# Patient Record
Sex: MALE | Race: WHITE | NOT HISPANIC OR LATINO | URBAN - METROPOLITAN AREA
[De-identification: names, ages, dates, MRNs, and addresses within clinical notes are randomized per-mention and may not be internally consistent; named-entity substitution may affect disease eponyms.]

---

## 2017-02-28 ENCOUNTER — OUTPATIENT (OUTPATIENT)
Dept: OUTPATIENT SERVICES | Facility: HOSPITAL | Age: 72
LOS: 1 days | End: 2017-02-28
Payer: COMMERCIAL

## 2017-02-28 PROCEDURE — 73564 X-RAY EXAM KNEE 4 OR MORE: CPT

## 2017-02-28 PROCEDURE — 73564 X-RAY EXAM KNEE 4 OR MORE: CPT | Mod: 26,RT

## 2017-03-16 ENCOUNTER — OUTPATIENT (OUTPATIENT)
Dept: OUTPATIENT SERVICES | Facility: HOSPITAL | Age: 72
LOS: 1 days | End: 2017-03-16

## 2017-03-16 DIAGNOSIS — Z22.321 CARRIER OR SUSPECTED CARRIER OF METHICILLIN SUSCEPTIBLE STAPHYLOCOCCUS AUREUS: ICD-10-CM

## 2017-03-17 LAB
MRSA PCR RESULT.: NEGATIVE — SIGNIFICANT CHANGE UP
S AUREUS DNA NOSE QL NAA+PROBE: NEGATIVE — SIGNIFICANT CHANGE UP

## 2017-08-15 ENCOUNTER — OUTPATIENT (OUTPATIENT)
Dept: OUTPATIENT SERVICES | Facility: HOSPITAL | Age: 72
LOS: 1 days | Discharge: HOME | End: 2017-08-15

## 2017-08-17 DIAGNOSIS — H25.12 AGE-RELATED NUCLEAR CATARACT, LEFT EYE: ICD-10-CM

## 2017-08-25 ENCOUNTER — OUTPATIENT (OUTPATIENT)
Dept: OUTPATIENT SERVICES | Facility: HOSPITAL | Age: 72
LOS: 1 days | Discharge: HOME | End: 2017-08-25

## 2017-08-30 DIAGNOSIS — Z96.653 PRESENCE OF ARTIFICIAL KNEE JOINT, BILATERAL: ICD-10-CM

## 2017-08-30 DIAGNOSIS — H25.11 AGE-RELATED NUCLEAR CATARACT, RIGHT EYE: ICD-10-CM

## 2017-08-30 DIAGNOSIS — Z88.0 ALLERGY STATUS TO PENICILLIN: ICD-10-CM

## 2017-08-30 DIAGNOSIS — I48.91 UNSPECIFIED ATRIAL FIBRILLATION: ICD-10-CM

## 2017-12-11 ENCOUNTER — APPOINTMENT (OUTPATIENT)
Dept: ORTHOPEDIC SURGERY | Facility: CLINIC | Age: 72
End: 2017-12-11
Payer: COMMERCIAL

## 2017-12-18 ENCOUNTER — APPOINTMENT (OUTPATIENT)
Dept: ORTHOPEDIC SURGERY | Facility: CLINIC | Age: 72
End: 2017-12-18
Payer: COMMERCIAL

## 2017-12-18 VITALS
HEART RATE: 109 BPM | HEIGHT: 74 IN | BODY MASS INDEX: 26.95 KG/M2 | DIASTOLIC BLOOD PRESSURE: 82 MMHG | WEIGHT: 210 LBS | SYSTOLIC BLOOD PRESSURE: 154 MMHG

## 2017-12-18 DIAGNOSIS — Z96.651 PRESENCE OF RIGHT ARTIFICIAL KNEE JOINT: ICD-10-CM

## 2017-12-18 DIAGNOSIS — M25.561 PAIN IN RIGHT KNEE: ICD-10-CM

## 2017-12-18 DIAGNOSIS — Z80.9 FAMILY HISTORY OF MALIGNANT NEOPLASM, UNSPECIFIED: ICD-10-CM

## 2017-12-18 DIAGNOSIS — Z96.659 OTHER MECHANICAL COMPLICATION OF OTHER INTERNAL JOINT PROSTHESIS, INITIAL ENCOUNTER: ICD-10-CM

## 2017-12-18 DIAGNOSIS — Z78.9 OTHER SPECIFIED HEALTH STATUS: ICD-10-CM

## 2017-12-18 DIAGNOSIS — Z87.891 PERSONAL HISTORY OF NICOTINE DEPENDENCE: ICD-10-CM

## 2017-12-18 DIAGNOSIS — S83.001A UNSPECIFIED SUBLUXATION OF RIGHT PATELLA, INITIAL ENCOUNTER: ICD-10-CM

## 2017-12-18 DIAGNOSIS — T84.098A OTHER MECHANICAL COMPLICATION OF OTHER INTERNAL JOINT PROSTHESIS, INITIAL ENCOUNTER: ICD-10-CM

## 2017-12-18 PROCEDURE — 99203 OFFICE O/P NEW LOW 30 MIN: CPT

## 2017-12-18 PROCEDURE — 73562 X-RAY EXAM OF KNEE 3: CPT | Mod: 50

## 2017-12-18 RX ORDER — ATENOLOL 50 MG/1
50 TABLET ORAL
Refills: 0 | Status: ACTIVE | COMMUNITY

## 2017-12-18 RX ORDER — TERAZOSIN 2 MG/1
2 CAPSULE ORAL
Qty: 10 | Refills: 0 | Status: ACTIVE | COMMUNITY
Start: 2017-06-25

## 2018-07-23 PROBLEM — Z78.9 ALCOHOL USE: Status: ACTIVE | Noted: 2017-12-18

## 2019-03-29 ENCOUNTER — APPOINTMENT (OUTPATIENT)
Dept: OTOLARYNGOLOGY | Facility: CLINIC | Age: 74
End: 2019-03-29

## 2019-04-15 ENCOUNTER — APPOINTMENT (OUTPATIENT)
Dept: OTOLARYNGOLOGY | Facility: CLINIC | Age: 74
End: 2019-04-15
Payer: COMMERCIAL

## 2019-04-15 PROCEDURE — V5299A: CUSTOM | Mod: NC

## 2019-07-29 ENCOUNTER — APPOINTMENT (OUTPATIENT)
Dept: OTOLARYNGOLOGY | Facility: CLINIC | Age: 74
End: 2019-07-29

## 2019-07-30 ENCOUNTER — APPOINTMENT (OUTPATIENT)
Dept: OTOLARYNGOLOGY | Facility: CLINIC | Age: 74
End: 2019-07-30
Payer: SELF-PAY

## 2019-07-30 PROCEDURE — 92593: CPT | Mod: NC

## 2019-10-18 ENCOUNTER — APPOINTMENT (OUTPATIENT)
Dept: OTOLARYNGOLOGY | Facility: CLINIC | Age: 74
End: 2019-10-18
Payer: SELF-PAY

## 2019-10-18 PROCEDURE — 92593: CPT | Mod: NC

## 2020-01-13 ENCOUNTER — APPOINTMENT (OUTPATIENT)
Dept: OTOLARYNGOLOGY | Facility: CLINIC | Age: 75
End: 2020-01-13
Payer: SELF-PAY

## 2020-01-13 PROCEDURE — 92593: CPT | Mod: NC

## 2020-04-15 ENCOUNTER — APPOINTMENT (OUTPATIENT)
Dept: OTOLARYNGOLOGY | Facility: CLINIC | Age: 75
End: 2020-04-15

## 2020-11-20 ENCOUNTER — APPOINTMENT (OUTPATIENT)
Dept: OTOLARYNGOLOGY | Facility: CLINIC | Age: 75
End: 2020-11-20
Payer: SELF-PAY

## 2020-11-20 PROCEDURE — 92593: CPT | Mod: NC

## 2021-01-22 ENCOUNTER — APPOINTMENT (OUTPATIENT)
Dept: OTOLARYNGOLOGY | Facility: CLINIC | Age: 76
End: 2021-01-22
Payer: MEDICARE

## 2021-01-22 VITALS — HEIGHT: 74 IN | TEMPERATURE: 97.3 F | WEIGHT: 210 LBS | BODY MASS INDEX: 26.95 KG/M2

## 2021-01-22 DIAGNOSIS — H61.21 IMPACTED CERUMEN, RIGHT EAR: ICD-10-CM

## 2021-01-22 DIAGNOSIS — Z86.79 PERSONAL HISTORY OF OTHER DISEASES OF THE CIRCULATORY SYSTEM: ICD-10-CM

## 2021-01-22 DIAGNOSIS — Z78.9 OTHER SPECIFIED HEALTH STATUS: ICD-10-CM

## 2021-01-22 DIAGNOSIS — Z80.9 FAMILY HISTORY OF MALIGNANT NEOPLASM, UNSPECIFIED: ICD-10-CM

## 2021-01-22 PROCEDURE — 92557 COMPREHENSIVE HEARING TEST: CPT

## 2021-01-22 PROCEDURE — G0268 REMOVAL OF IMPACTED WAX MD: CPT

## 2021-01-22 PROCEDURE — 99213 OFFICE O/P EST LOW 20 MIN: CPT | Mod: 25

## 2021-01-22 PROCEDURE — 92567 TYMPANOMETRY: CPT

## 2021-01-22 PROCEDURE — 69210 REMOVE IMPACTED EAR WAX UNI: CPT

## 2021-01-22 NOTE — HISTORY OF PRESENT ILLNESS
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 I had last seen in 2 years ago. He has to sensory neural hearing losses and has been doing quite well with his hearing aids. He was told that he has a cerumen impaction. He denies otalgia or otorrhea.The patient had no other ear nose or throat complaints at this visit.

## 2021-01-22 NOTE — PHYSICAL EXAM
[FreeTextEntry1] : \par The patient was alert and oriented and in no distress.\par Voice was clear.\par \par Face:\par The patient had no facial asymmetry or mass.\par The skin was unremarkable.\par \par Eyes:\par The pupils were equal round and reactive to light and accommodation.\par There was no significant nystagmus or disconjugate gaze noted.\par \par Nose: \par The external nose had no significant deformity.  There was no facial tenderness.  On anterior rhinoscopy, the nasal mucosa was clear.  The anterior septum was midline.  There were no visualized polyps purulence  or masses.\par \par Oral cavity:\par The oral mucosa was normal.\par The oral and base of tongue were clear and without mass.\par The gingival and buccal mucosa were moist and without lesions.\par The palate moved well.\par There was no cleft to the palate.\par There appeared to be good salivary flow.  \par There was no pus, erythema or mass in the oral cavity.\par \par \par Ears:\par Procedure Note\par Removal of Cerumen- right ear   cpt [id number]\par Dx:  Symptomatic cerument impaction- right ear\par Both external ears were normal.\par There was a dense cerumen impaction in the right ear.  This was cleared microscopically using suction and curettes without trauma.  Beyond that, both ear canals were clear both eardrums were intact and mobile.\par \par Neck: \par The neck was symmetrical.\par The parotid and submandibular glands were normal without masses.\par The trachea was midline and there was no unusual crepitus.\par The thyroid was smooth and nontender and no masses were palpated.\par There was no significant cervical adenopathy.\par \par \par Neuro:\par Neurologically, the patient was awake, alert, and oriented to person, place and time. There were no obvious focal neurologic abnormalities.  Cranial nerves II through XII were grossly intact.\par \par \par TMJ:\par The temporomandibular joints were nontender.\par There was no abnormal crepitus and no significant malocclusion\par \par  [de-identified] : Audiogram:\par \par Audiometry showed that both ears had normal hearing through the lower speech frequencies with high pitched symmetric sensorineural hearing loss as above that.  Reflexes were intact and the patient had type A tympanograms.  The audiogram was reviewed with the patient. There was little change from 2 years ago

## 2021-01-22 NOTE — ASSESSMENT
[FreeTextEntry1] : It was my impression that he is the sensory neural hearing losses and is doing well with his hearing aids. He had a cerumen impaction on the right that was cleared and afterwards noticed a significant improvement in his hearing.\par I suggested having his hearing aids adjusted as needed and would like to reevaluate in a year or earlier if the need arises

## 2021-01-22 NOTE — CONSULT LETTER
[FreeTextEntry2] : AHMET ROMEO\par  [FreeTextEntry1] : \par \par Dear  Dr. AHMET ROMEO,\par \par I had the pleasure of seeing your patient today.  \par Please see my note below.\par \par \par Thank you very much for allowing me to participate in the care of your patient.\par \par Sincerely,\par \par \par Dat Bustos\par \par \par Leighton Bustos MD\par [facility]\par [facility]\par  [facility]\par \par

## 2021-02-19 ENCOUNTER — APPOINTMENT (OUTPATIENT)
Dept: OTOLARYNGOLOGY | Facility: CLINIC | Age: 76
End: 2021-02-19

## 2021-03-08 ENCOUNTER — APPOINTMENT (OUTPATIENT)
Dept: OTOLARYNGOLOGY | Facility: CLINIC | Age: 76
End: 2021-03-08

## 2021-03-08 ENCOUNTER — APPOINTMENT (OUTPATIENT)
Dept: OTOLARYNGOLOGY | Facility: CLINIC | Age: 76
End: 2021-03-08
Payer: SELF-PAY

## 2021-03-08 PROCEDURE — 92593: CPT | Mod: NC

## 2021-06-07 ENCOUNTER — APPOINTMENT (OUTPATIENT)
Dept: OTOLARYNGOLOGY | Facility: CLINIC | Age: 76
End: 2021-06-07
Payer: SELF-PAY

## 2021-06-07 PROCEDURE — 92593: CPT | Mod: NC

## 2021-09-14 ENCOUNTER — APPOINTMENT (OUTPATIENT)
Dept: OTOLARYNGOLOGY | Facility: CLINIC | Age: 76
End: 2021-09-14
Payer: SELF-PAY

## 2021-09-14 PROCEDURE — 92593: CPT | Mod: NC

## 2021-12-22 ENCOUNTER — APPOINTMENT (OUTPATIENT)
Dept: OTOLARYNGOLOGY | Facility: CLINIC | Age: 76
End: 2021-12-22
Payer: MEDICARE

## 2021-12-22 DIAGNOSIS — H69.80 OTHER SPECIFIED DISORDERS OF EUSTACHIAN TUBE, UNSPECIFIED EAR: ICD-10-CM

## 2021-12-22 PROCEDURE — 99212 OFFICE O/P EST SF 10 MIN: CPT

## 2021-12-22 PROCEDURE — 92557 COMPREHENSIVE HEARING TEST: CPT

## 2021-12-22 PROCEDURE — 92567 TYMPANOMETRY: CPT

## 2021-12-24 PROBLEM — H69.80 EUSTACHIAN TUBE DYSFUNCTION: Status: ACTIVE | Noted: 2021-12-24

## 2021-12-24 NOTE — PHYSICAL EXAM
[FreeTextEntry1] : complete ENT examination including ear evaluation with an operating microscope was within normal limits.    Complete audiogram was ordered, done and reviewed with patient and compared to ohis previous.   This showed the persistent symmetric high frequency hearing losses, with little change from previous and jnormal  tympanograms in both ears.

## 2021-12-24 NOTE — REASON FOR VISIT
[Subsequent Evaluation] : a subsequent evaluation for [Hearing Loss] : hearing loss [FreeTextEntry2] : ears feel clogged

## 2021-12-24 NOTE — ASSESSMENT
[FreeTextEntry1] : It was my impression that he has the stable hearing losses and I suggested following up with is audiologist for monitoring his aids.\par I did not see any significant pathology and in fact his symptoms have resolved.  I suggested no significant intervention at this point, but would add a brief course of nasal steroids if the symptoms recur.

## 2021-12-24 NOTE — CONSULT LETTER
[FreeTextEntry2] : AHMET ROMEO\par  [FreeTextEntry1] : \par \par Dear  Dr. AHMET ROMEO,\par \par I had the pleasure of seeing your patient today.  \par Please see my note below.\par \par \par Thank you very much for allowing me to participate in the care of your patient.\par \par Sincerely,\par \par \par Leighton Bustos MD\par [facility]\par [facility]\par  [facility]\par \par

## 2022-01-10 ENCOUNTER — APPOINTMENT (OUTPATIENT)
Dept: OTOLARYNGOLOGY | Facility: CLINIC | Age: 77
End: 2022-01-10

## 2022-01-31 ENCOUNTER — APPOINTMENT (OUTPATIENT)
Dept: OTOLARYNGOLOGY | Facility: CLINIC | Age: 77
End: 2022-01-31
Payer: SELF-PAY

## 2022-01-31 PROCEDURE — 92593: CPT | Mod: NC

## 2022-04-05 ENCOUNTER — INPATIENT (INPATIENT)
Facility: HOSPITAL | Age: 77
LOS: 1 days | Discharge: HOME | DRG: 206 | End: 2022-04-07
Attending: INTERNAL MEDICINE | Admitting: INTERNAL MEDICINE
Payer: MEDICARE

## 2022-04-05 VITALS
RESPIRATION RATE: 18 BRPM | SYSTOLIC BLOOD PRESSURE: 113 MMHG | DIASTOLIC BLOOD PRESSURE: 53 MMHG | OXYGEN SATURATION: 91 % | HEART RATE: 53 BPM | TEMPERATURE: 98 F | WEIGHT: 220.02 LBS

## 2022-04-05 DIAGNOSIS — I12.9 HYPERTENSIVE CHRONIC KIDNEY DISEASE WITH STAGE 1 THROUGH STAGE 4 CHRONIC KIDNEY DISEASE, OR UNSPECIFIED CHRONIC KIDNEY DISEASE: ICD-10-CM

## 2022-04-05 DIAGNOSIS — J70.4 DRUG-INDUCED INTERSTITIAL LUNG DISORDERS, UNSPECIFIED: ICD-10-CM

## 2022-04-05 LAB
ALBUMIN SERPL ELPH-MCNC: 3.3 G/DL — SIGNIFICANT CHANGE UP (ref 3.3–5)
ALP SERPL-CCNC: 81 U/L — SIGNIFICANT CHANGE UP (ref 40–120)
ALT FLD-CCNC: 70 U/L — HIGH (ref 10–45)
ANION GAP SERPL CALC-SCNC: 12 MMOL/L — SIGNIFICANT CHANGE UP (ref 5–17)
ANISOCYTOSIS BLD QL: SLIGHT — SIGNIFICANT CHANGE UP
APTT BLD: 34.2 SEC — SIGNIFICANT CHANGE UP (ref 27.5–35.5)
AST SERPL-CCNC: 67 U/L — HIGH (ref 10–40)
BASE EXCESS BLDV CALC-SCNC: -2.9 MMOL/L — LOW (ref -2–3)
BASOPHILS # BLD AUTO: 0 K/UL — SIGNIFICANT CHANGE UP (ref 0–0.2)
BASOPHILS NFR BLD AUTO: 0 % — SIGNIFICANT CHANGE UP (ref 0–2)
BILIRUB SERPL-MCNC: 1.4 MG/DL — HIGH (ref 0.2–1.2)
BUN SERPL-MCNC: 23 MG/DL — SIGNIFICANT CHANGE UP (ref 7–23)
CA-I SERPL-SCNC: 1.12 MMOL/L — LOW (ref 1.15–1.33)
CALCIUM SERPL-MCNC: 8.3 MG/DL — LOW (ref 8.4–10.5)
CHLORIDE SERPL-SCNC: 101 MMOL/L — SIGNIFICANT CHANGE UP (ref 96–108)
CK MB CFR SERPL CALC: 2.1 NG/ML — SIGNIFICANT CHANGE UP (ref 0–6.7)
CK SERPL-CCNC: 81 U/L — SIGNIFICANT CHANGE UP (ref 30–200)
CO2 BLDV-SCNC: 23.2 MMOL/L — SIGNIFICANT CHANGE UP (ref 22–26)
CO2 SERPL-SCNC: 20 MMOL/L — LOW (ref 22–31)
CREAT SERPL-MCNC: 1.5 MG/DL — HIGH (ref 0.5–1.3)
CRP SERPL-MCNC: 202.9 MG/L — HIGH (ref 0–4)
D DIMER BLD IA.RAPID-MCNC: 245 NG/ML DDU — HIGH
EGFR: 48 ML/MIN/1.73M2 — LOW
EOSINOPHIL # BLD AUTO: 0 K/UL — SIGNIFICANT CHANGE UP (ref 0–0.5)
EOSINOPHIL NFR BLD AUTO: 0 % — SIGNIFICANT CHANGE UP (ref 0–6)
ERYTHROCYTE [SEDIMENTATION RATE] IN BLOOD: 110 MM/HR — HIGH
FERRITIN SERPL-MCNC: 124 NG/ML — SIGNIFICANT CHANGE UP (ref 30–400)
FERRITIN SERPL-MCNC: 124 NG/ML — SIGNIFICANT CHANGE UP (ref 30–400)
FLUAV AG NPH QL: SIGNIFICANT CHANGE UP
FLUBV AG NPH QL: SIGNIFICANT CHANGE UP
GAS PNL BLDV: 131 MMOL/L — LOW (ref 136–145)
GAS PNL BLDV: SIGNIFICANT CHANGE UP
GAS PNL BLDV: SIGNIFICANT CHANGE UP
GIANT PLATELETS BLD QL SMEAR: PRESENT — SIGNIFICANT CHANGE UP
GLUCOSE SERPL-MCNC: 115 MG/DL — HIGH (ref 70–99)
HCO3 BLDV-SCNC: 22 MMOL/L — SIGNIFICANT CHANGE UP (ref 22–29)
HCT VFR BLD CALC: 29.4 % — LOW (ref 39–50)
HGB BLD-MCNC: 9.4 G/DL — LOW (ref 13–17)
HYPOCHROMIA BLD QL: SLIGHT — SIGNIFICANT CHANGE UP
INR BLD: 2.17 — HIGH (ref 0.88–1.16)
LACTATE SERPL-SCNC: 1.2 MMOL/L — SIGNIFICANT CHANGE UP (ref 0.5–2)
LYMPHOCYTES # BLD AUTO: 0.65 K/UL — LOW (ref 1–3.3)
LYMPHOCYTES # BLD AUTO: 6.2 % — LOW (ref 13–44)
MACROCYTES BLD QL: SLIGHT — SIGNIFICANT CHANGE UP
MAGNESIUM SERPL-MCNC: 2 MG/DL — SIGNIFICANT CHANGE UP (ref 1.6–2.6)
MANUAL SMEAR VERIFICATION: SIGNIFICANT CHANGE UP
MCHC RBC-ENTMCNC: 31.5 PG — SIGNIFICANT CHANGE UP (ref 27–34)
MCHC RBC-ENTMCNC: 32 GM/DL — SIGNIFICANT CHANGE UP (ref 32–36)
MCV RBC AUTO: 98.7 FL — SIGNIFICANT CHANGE UP (ref 80–100)
MONOCYTES # BLD AUTO: 0.84 K/UL — SIGNIFICANT CHANGE UP (ref 0–0.9)
MONOCYTES NFR BLD AUTO: 8 % — SIGNIFICANT CHANGE UP (ref 2–14)
NEUTROPHILS # BLD AUTO: 8.96 K/UL — HIGH (ref 1.8–7.4)
NEUTROPHILS NFR BLD AUTO: 85.8 % — HIGH (ref 43–77)
NT-PROBNP SERPL-SCNC: 1585 PG/ML — HIGH (ref 0–300)
OVALOCYTES BLD QL SMEAR: SLIGHT — SIGNIFICANT CHANGE UP
PCO2 BLDV: 38 MMHG — LOW (ref 42–55)
PH BLDV: 7.37 — SIGNIFICANT CHANGE UP (ref 7.32–7.43)
PLAT MORPH BLD: ABNORMAL
PLATELET # BLD AUTO: 204 K/UL — SIGNIFICANT CHANGE UP (ref 150–400)
PO2 BLDV: <29 MMHG — SIGNIFICANT CHANGE UP (ref 25–45)
POIKILOCYTOSIS BLD QL AUTO: SLIGHT — SIGNIFICANT CHANGE UP
POLYCHROMASIA BLD QL SMEAR: SLIGHT — SIGNIFICANT CHANGE UP
POTASSIUM BLDV-SCNC: 4.1 MMOL/L — SIGNIFICANT CHANGE UP (ref 3.5–5.1)
POTASSIUM SERPL-MCNC: 4.2 MMOL/L — SIGNIFICANT CHANGE UP (ref 3.5–5.3)
POTASSIUM SERPL-SCNC: 4.2 MMOL/L — SIGNIFICANT CHANGE UP (ref 3.5–5.3)
PROCALCITONIN SERPL-MCNC: 0.19 NG/ML — HIGH (ref 0.02–0.1)
PROT SERPL-MCNC: 7.1 G/DL — SIGNIFICANT CHANGE UP (ref 6–8.3)
PROTHROM AB SERPL-ACNC: 26 SEC — HIGH (ref 10.5–13.4)
RBC # BLD: 2.98 M/UL — LOW (ref 4.2–5.8)
RBC # FLD: 13.2 % — SIGNIFICANT CHANGE UP (ref 10.3–14.5)
RBC BLD AUTO: ABNORMAL
RSV RNA NPH QL NAA+NON-PROBE: SIGNIFICANT CHANGE UP
SAO2 % BLDV: 36.2 % — LOW (ref 67–88)
SARS-COV-2 RNA SPEC QL NAA+PROBE: SIGNIFICANT CHANGE UP
SODIUM SERPL-SCNC: 133 MMOL/L — LOW (ref 135–145)
SPHEROCYTES BLD QL SMEAR: SLIGHT — SIGNIFICANT CHANGE UP
TROPONIN T SERPL-MCNC: 0.01 NG/ML — SIGNIFICANT CHANGE UP (ref 0–0.01)
TROPONIN T SERPL-MCNC: <0.01 NG/ML — SIGNIFICANT CHANGE UP (ref 0–0.01)
WBC # BLD: 10.44 K/UL — SIGNIFICANT CHANGE UP (ref 3.8–10.5)
WBC # FLD AUTO: 10.44 K/UL — SIGNIFICANT CHANGE UP (ref 3.8–10.5)

## 2022-04-05 PROCEDURE — 71275 CT ANGIOGRAPHY CHEST: CPT | Mod: 26,MA

## 2022-04-05 PROCEDURE — 99285 EMERGENCY DEPT VISIT HI MDM: CPT

## 2022-04-05 PROCEDURE — 93010 ELECTROCARDIOGRAM REPORT: CPT

## 2022-04-05 PROCEDURE — 71045 X-RAY EXAM CHEST 1 VIEW: CPT | Mod: 26

## 2022-04-05 RX ORDER — LEVOTHYROXINE SODIUM 125 MCG
25 TABLET ORAL DAILY
Refills: 0 | Status: DISCONTINUED | OUTPATIENT
Start: 2022-04-05 | End: 2022-04-06

## 2022-04-05 RX ORDER — FUROSEMIDE 40 MG
40 TABLET ORAL ONCE
Refills: 0 | Status: COMPLETED | OUTPATIENT
Start: 2022-04-05 | End: 2022-04-05

## 2022-04-05 RX ORDER — VENLAFAXINE HCL 75 MG
150 CAPSULE, EXT RELEASE 24 HR ORAL DAILY
Refills: 0 | Status: DISCONTINUED | OUTPATIENT
Start: 2022-04-05 | End: 2022-04-07

## 2022-04-05 RX ORDER — IPRATROPIUM BROMIDE 0.2 MG/ML
500 SOLUTION, NON-ORAL INHALATION EVERY 6 HOURS
Refills: 0 | Status: DISCONTINUED | OUTPATIENT
Start: 2022-04-05 | End: 2022-04-07

## 2022-04-05 RX ORDER — ACETAMINOPHEN 500 MG
650 TABLET ORAL EVERY 6 HOURS
Refills: 0 | Status: DISCONTINUED | OUTPATIENT
Start: 2022-04-05 | End: 2022-04-07

## 2022-04-05 RX ORDER — ATORVASTATIN CALCIUM 80 MG/1
20 TABLET, FILM COATED ORAL AT BEDTIME
Refills: 0 | Status: DISCONTINUED | OUTPATIENT
Start: 2022-04-05 | End: 2022-04-06

## 2022-04-05 RX ORDER — FUROSEMIDE 40 MG
40 TABLET ORAL
Refills: 0 | Status: DISCONTINUED | OUTPATIENT
Start: 2022-04-05 | End: 2022-04-06

## 2022-04-05 RX ORDER — APIXABAN 2.5 MG/1
5 TABLET, FILM COATED ORAL EVERY 12 HOURS
Refills: 0 | Status: DISCONTINUED | OUTPATIENT
Start: 2022-04-05 | End: 2022-04-07

## 2022-04-05 RX ORDER — ATENOLOL 25 MG/1
25 TABLET ORAL
Refills: 0 | Status: DISCONTINUED | OUTPATIENT
Start: 2022-04-05 | End: 2022-04-06

## 2022-04-05 RX ADMIN — Medication 40 MILLIGRAM(S): at 16:16

## 2022-04-05 RX ADMIN — APIXABAN 5 MILLIGRAM(S): 2.5 TABLET, FILM COATED ORAL at 23:15

## 2022-04-05 NOTE — H&P ADULT - NSICDXPASTMEDICALHX_GEN_ALL_CORE_FT
PAST MEDICAL HISTORY:  ELVI (acute kidney injury) No hx of CKD   Creatinine 1.5 on this admisison 4/5/22    Atrial fibrillation s/p cardioversion 2010 and 2014  Pt. reports 4 DCCV  Now on Amiodarone 200mg PO bid and Eliquis 5mg PO bid  Last DCCV 4 yrs ago at [facility]    Crohn's disease s/p partial resection of ileum    Essential hypertension Hypertension    History of depression On Venlafaxine ER 150mg PO bid    Hyperlipidemia     Hypothyroidism

## 2022-04-05 NOTE — H&P ADULT - PROBLEM SELECTOR PLAN 7
F/U Lipid and LFT's in AM  Continue Atorvastatin 20mg PO daily (Pt. takes Rosuvastatin 5mg PO daily at home)

## 2022-04-05 NOTE — ED PROVIDER NOTE - PROGRESS NOTE DETAILS
Paged cardio for admission paged cardio for admission Pt seen in the ED by Dr Aguiar - conrad w/ admission to cardio for CHF tx and w/u

## 2022-04-05 NOTE — H&P ADULT - NSICDXPASTSURGICALHX_GEN_ALL_CORE_FT
PAST SURGICAL HISTORY:  Other specified personal history presenting hazards to health History of bowel resection     PAST SURGICAL HISTORY:  H/O knee surgery     History of cataract surgery     Other specified personal history presenting hazards to health History of bowel resection

## 2022-04-05 NOTE — H&P ADULT - NSHPPHYSICALEXAM_GEN_ALL_CORE
T(C): 36.8 (04-05-22 @ 19:21), Max: 36.8 (04-05-22 @ 16:57)  HR: 48 (04-05-22 @ 19:21) (48 - 53)  BP: 111/58 (04-05-22 @ 19:21) (111/58 - 154/74)  RR: 20 (04-05-22 @ 19:21) (18 - 21)  SpO2: 93% (04-05-22 @ 19:21) (90% - 97%)  Wt(kg): --    Appearance: Normal	  HEENT:   Normal oral mucosa, , EOMI	  Neck: Supple, - JVD; No Carotid Bruit and 2+ pulses B/L  Cardiovascular: Normal S1 S2, , No murmurs  Respiratory: Lungs b/l crackles at the Lower bases of lungs  Gastrointestinal: distended  Soft, Non-tender, + BS X4	  Skin: No rashes, No ecchymoses, No cyanosis  Extremities: Normal range of motion, No clubbing, cyanosis or edema  Vascular: Femoral pulses 2+ b/l without bruit, DP 2+ b/l, PT 1+ b/l  Neurologic: Non-focal  Psychiatry: A & O x 4, Mood & affect appropriate

## 2022-04-05 NOTE — PATIENT PROFILE ADULT - FALL HARM RISK - HARM RISK INTERVENTIONS
Assistance with ambulation/Assistance OOB with selected safe patient handling equipment/Communicate Risk of Fall with Harm to all staff/Monitor gait and stability/Reinforce activity limits and safety measures with patient and family/Sit up slowly, dangle for a short time, stand at bedside before walking/Tailored Fall Risk Interventions/Visual Cue: Yellow wristband and red socks/Bed in lowest position, wheels locked, appropriate side rails in place/Call bell, personal items and telephone in reach/Instruct patient to call for assistance before getting out of bed or chair/Non-slip footwear when patient is out of bed/[place] to call system/Physically safe environment - no spills, clutter or unnecessary equipment/Purposeful Proactive Rounding/Room/bathroom lighting operational, light cord in reach

## 2022-04-05 NOTE — H&P ADULT - NSHPLABSRESULTS_GEN_ALL_CORE
9.4    10.44 )-----------( 204      ( 05 Apr 2022 14:24 )             29.4       04-05    133<L>  |  101  |  23  ----------------------------<  115<H>  4.2   |  20<L>  |  1.50<H>    Ca    8.3<L>      05 Apr 2022 14:24  Mg     2.0     04-05    TPro  7.1  /  Alb  3.3  /  TBili  1.4<H>  /  DBili  x   /  AST  67<H>  /  ALT  70<H>  /  AlkPhos  81  04-05      PT/INR - ( 05 Apr 2022 20:28 )   PT: 26.0 sec;   INR: 2.17          PTT - ( 05 Apr 2022 20:28 )  PTT:34.2 sec    CARDIAC MARKERS ( 05 Apr 2022 20:29 )  x     / 0.01 ng/mL / 81 U/L / x     / 2.1 ng/mL  CARDIAC MARKERS ( 05 Apr 2022 14:24 )  x     / <0.01 ng/mL / x     / x     / x                EKG: Sinus Arie@52bpm with non specific ST-T wave changes

## 2022-04-05 NOTE — ED ADULT NURSE NOTE - NSICDXPASTMEDICALHX_GEN_ALL_CORE_FT
PAST MEDICAL HISTORY:  Atrial fibrillation s/p cardioversion 2010 and 2014    Crohn's disease s/p partial resection of ileum    Essential hypertension Hypertension

## 2022-04-05 NOTE — H&P ADULT - REASON FOR ADMISSION
Generalized weakness, mild generalized muscle aches, mild HA and one episode of diarrhea, worsening SOB and GABRIEL Generalized weakness, mild generalized muscle aches, mild HA, worsening SOB and GABRIEL

## 2022-04-05 NOTE — ED ADULT NURSE NOTE - NSIMPLEMENTINTERV_GEN_ALL_ED
Implemented All Universal Safety Interventions:  [place] to call system. Call bell, personal items and telephone within reach. Instruct patient to call for assistance. Room bathroom lighting operational. Non-slip footwear when patient is off stretcher. Physically safe environment: no spills, clutter or unnecessary equipment. Stretcher in lowest position, wheels locked, appropriate side rails in place.

## 2022-04-05 NOTE — H&P ADULT - PROBLEM SELECTOR PLAN 4
Currently in NSR.  Pt. has hx of failed X4 DCCV for Atrial Fib/Flutter.  Cardiologist Dr. Denny    Pt. on Amiodarone 200mg PO bid and Eliquis 5mg PO bid.  Consult EP in AM  F/U Amiodarone level in AM  Holding Amiodarone for now 2/2 to lungs and elevated creatinine.

## 2022-04-05 NOTE — ED PROVIDER NOTE - PHYSICAL EXAMINATION
Constitutional: Well appearing, awake, alert, oriented to person, place, time/situation and in no apparent distress.  ENMT: Airway patent. Normal MM  Eyes: Clear bilaterally  Cardiac: bradycadic rate, regular rhythm.  Heart sounds S1, S2.  No murmurs, rubs or gallops. No JVD or LE edema  Respiratory: Dimnished and crackles at the bases. No increased WOB, or accessory mm use. PRR 20-24, Sp02 89-90% on RA. Pt speaks in full sentences.   Gastrointestinal: Abd soft, NT, ND, NABS. No guarding, rebound, or rigidity. No pulsatile abdominal masses.  Musculoskeletal: Range of motion is not limited. No LE edema or calf ttp  Neuro: Alert and oriented x 3, face symmetric and speech fluent. Strength 5/5 x 4 ext and symmetric, nml gross motor movement, nml gait. No focal deficits noted.  Skin: Skin normal color for race, warm, dry and intact. No evidence of rash.  Psych: Alert and oriented to person, place, time/situation. normal mood and affect. no apparent risk to self or others.

## 2022-04-05 NOTE — H&P ADULT - PROBLEM SELECTOR PLAN 3
BUN/Creatinine 23/1.50  Pt. denies hx of CKD.  Monitor and Nephro consult in AM  Holding Allopurinol 300mg PO daily, Amiodarone 200mg PO bid.

## 2022-04-05 NOTE — ED ADULT TRIAGE NOTE - INTERNATIONAL TRAVEL
Pt returned from xray medicated with ibuprofen as ordered.
Pt was discharged and given instructions by Dr Ajay De Leon. Pt verbalized good understanding of all discharge instructions,prescriptions and F/U care. All questions answered. Pt in stable condition on discharge.
No

## 2022-04-05 NOTE — H&P ADULT - NSHPSOCIALHISTORY_GEN_ALL_CORE
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  Denies hx of Tobacco, illicit drug use.  Occasional EtOH
left normal/right normal/+2 pulses b/l

## 2022-04-05 NOTE — H&P ADULT - PROBLEM SELECTOR PLAN 8
Venlafaxine ER 150mg PO bid.  Please confirm with pharmacy dose: [facility] [phone]

## 2022-04-05 NOTE — H&P ADULT - NSHPREVIEWOFSYSTEMS_GEN_ALL_CORE
GENERAL, CONSTITUTIONAL : admits to generalized weakness, fatigue, denies recent weight loss, fever, chills  EYES, VISION: denies changes in vision   EARS, NOSE, THROAT: denies hearing loss  HEART, CARDIOVASCULAR: denies chest pain, arrhythmia, palpitations,   RESPIRATORY: admits to dry cough, SOB, GABRIEL with minimal exertion denies wheezing, PND, orthopnea  GASTROINTESTINAL:  Denies abdominal pain, GERD,  bloody stool, dark tarry stool  GENITOURINARY: Denies frequent urination, urgency  MUSCULOSKELETAL admits to myalgia denies  joint pain or swelling, restricted motion.   SKIN & INTEGUMENTARY Denies rashes, sores, blisters, blisters, growths.  NEUROLOGICAL: Denies numbness or tingling sensations, sensation loss, burning.   PSYCHIATRIC: Denies nervousness, anxiety, depression  ENDOCRINE Denies heat or cold intolerance, excessive thirst  HEMATOLOGIC/LYMPHATIC: Denies abnormal bleeding, bleeding of any kind

## 2022-04-05 NOTE — ED ADULT NURSE NOTE - NSICDXPASTSURGICALHX_GEN_ALL_CORE_FT
PAST SURGICAL HISTORY:  Other specified personal history presenting hazards to health History of bowel resection

## 2022-04-05 NOTE — ED PROVIDER NOTE - CLINICAL SUMMARY MEDICAL DECISION MAKING FREE TEXT BOX
Pt p/w SOB, GABRIEL, fatigue, a/w mild HA, myalgias. DDx includes but not limited to COVID19 w/ PNA, acute decompensated CHF, less likely PE (On AC and is compliant), less likely other pathology. Check labs, EKG, CXR, consider CT. Dispo pending w/u and clinical status. Likely admit

## 2022-04-05 NOTE — H&P ADULT - HISTORY OF PRESENT ILLNESS
A&O X4 Full Code    In terms of COVID-19 pt. 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 ER this afternoon, 4/5/22, complaining of four days of worsening fatigue, generalized weakness, mild HA, dry cough with mild myalgia . He denies orthopnea, PND, LE edema, and CP. He reports gen malaise, mild HA, and diarrhea. He is fully vaccinated against COVID19 w/ booster. He had monitoring CXR last week which was negative. He had home COVID19 test 2 days ago, also negative. He saw PCP Dr Aguiar in the ED today, and referred in for SOB, rales, and hypoxia. He reports minimal dry cough. cardiologist is Dr Denny at [facility]. No hx CHF. No known pulm disease. No sick  A&O X4 Full Code    In terms of COVID-19 pt. denies hx of COVID-19 and has received full COVID-19 Moderna vaccination plus Moderna booster.      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 denies hx of CKD), Anemia and Depression presents to [facility] ER this afternoon, 4/5/22, complaining of  worsening fatigue, generalized weakness, mild HA, dry cough with mild myalgia and GABRIEL (with minimal exertion) X 4 days.    In speaking with patient, he reports symptoms of generalized weakness, dry cough and worsening GABRIEL occurring four days ago.  According to patient, he took a at home COVID-19 test two days ago which was negative and had a CXR last week which was also negative.   He went to seen his PCP, Dr. Bauer, this morning regarding symptoms who wanted him admitted to [facility] for SOB, Hypoxia and rales for further evaluation.  Pt. denies cardiac hx, CKD, pulmonary disease, fever, chills, chest pain, orthopnea, b/l edema, dizziness, diaphoresis, abdominal discomfort and n/v/d.  Pt. also denies recent travel and sick contact.    In ER ECG reveals Sinus Arie@52bpm with non specific ST-T wave changes, Troponin neg X2, CK 81, CKMB 2.1 BNP 1585, D-dimer 245, Ferritin 124, Lactate 1.2,   , , H/H 9.4/29.4,  WBC 10.4,  Na 133, Neutrophil 85.8, Procalcitonin 0.19, BUN/Creatinine 23/1.50, Total Bili 1.4, AST 67, ALT 70, COVID-19 non detected, RSV neg, CXR reveals b/l ground glass opacities with b/l small pleural effusions and CTA PE protocol b/l ground glass opacities interlobular septal, small b/l pleural effusion with stable 2014 mediastinum lymphadenopathy.    In light of patient's risk factors, elevated inflammatory markers, ELVI, abnormal chest XRay, CTA of chest patient is admitted to [facility] for suspected COVID-19 vs acute CHF exacerbation.

## 2022-04-05 NOTE — ED PROVIDER NOTE - OBJECTIVE STATEMENT
Pt w/ PMHx GI 2/2 Crohns s/p bowel resection, AF/flutter s/p multiple DCCV on Amio / Eliquis, HLD, HTN, gout, anemia p/w p/w 4 days of worsening SOB, GABRIEL, gen fatigue. He denies orthopnea, PND, LE edema, and CP. He reports gen malaise, mild HA, and diarrhea. He is fully vaccinated against COVID19 w/ booster. He had monitoring CXR last week which was negative. He had home COVID19 test 2 days ago, also negative. He saw PCP Dr Aguiar in the ED today, and referred in for SOB, rales, and hypoxia. He reports minimal dry cough. cardiologist is Dr Denny at [facility]. No hx CHF. No known pulm disease. No sick contacts or recent travel.

## 2022-04-05 NOTE — H&P ADULT - PROBLEM SELECTOR PLAN 2
Elevated inflammatory markers, b/l groundglass opacities interlobular septal, small b/l pleural effusions stable 2014 mediastinum lymphadenopathy, increase main pulmonary artery dilation, can be seen with pulmonary HTN.  COVID-19 swab neg.  Procalcitonin elevated 0.19,   D-dimer 245  Pulmonary consult in AM.  Pt. is also on (Amiodarone 200mg PO bid)   COVID-19 neg RSVP neg   Reswab in AM for COVID-19

## 2022-04-05 NOTE — H&P ADULT - ASSESSMENT
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 denies hx of CKD), Anemia and Depression presents to [facility] ER this afternoon, 4/5/22, complaining of  worsening fatigue, generalized weakness, mild HA, dry cough with mild myalgia and GABRIEL (with minimal exertion) X 4 days.

## 2022-04-05 NOTE — H&P ADULT - PROBLEM SELECTOR PLAN 1
Core measures: daily weight, Strict I&O q4 hrs   BNP 1585  Lasix 40mg IV bid  CTA of chest reveals no PE, b/l ground glass opacities interlobular septal, small b/l pleural effusions, stable 2014, mediastinum lymphadenopathy, increase main pulmonary artery dilation can be seen with pulmonary HTN.  Echocardiogram in AM

## 2022-04-06 DIAGNOSIS — Z87.19 PERSONAL HISTORY OF OTHER DISEASES OF THE DIGESTIVE SYSTEM: ICD-10-CM

## 2022-04-06 DIAGNOSIS — Z20.822 CONTACT WITH AND (SUSPECTED) EXPOSURE TO COVID-19: ICD-10-CM

## 2022-04-06 DIAGNOSIS — I50.9 HEART FAILURE, UNSPECIFIED: ICD-10-CM

## 2022-04-06 DIAGNOSIS — F32.9 MAJOR DEPRESSIVE DISORDER, SINGLE EPISODE, UNSPECIFIED: ICD-10-CM

## 2022-04-06 DIAGNOSIS — Z29.9 ENCOUNTER FOR PROPHYLACTIC MEASURES, UNSPECIFIED: ICD-10-CM

## 2022-04-06 DIAGNOSIS — R06.00 DYSPNEA, UNSPECIFIED: ICD-10-CM

## 2022-04-06 DIAGNOSIS — I48.91 UNSPECIFIED ATRIAL FIBRILLATION: ICD-10-CM

## 2022-04-06 DIAGNOSIS — E03.9 HYPOTHYROIDISM, UNSPECIFIED: ICD-10-CM

## 2022-04-06 DIAGNOSIS — Z98.890 OTHER SPECIFIED POSTPROCEDURAL STATES: Chronic | ICD-10-CM

## 2022-04-06 DIAGNOSIS — Z98.49 CATARACT EXTRACTION STATUS, UNSPECIFIED EYE: Chronic | ICD-10-CM

## 2022-04-06 DIAGNOSIS — E78.5 HYPERLIPIDEMIA, UNSPECIFIED: ICD-10-CM

## 2022-04-06 DIAGNOSIS — I10 ESSENTIAL (PRIMARY) HYPERTENSION: ICD-10-CM

## 2022-04-06 DIAGNOSIS — N17.9 ACUTE KIDNEY FAILURE, UNSPECIFIED: ICD-10-CM

## 2022-04-06 LAB
ALBUMIN SERPL ELPH-MCNC: 3.2 G/DL — LOW (ref 3.3–5)
ALP SERPL-CCNC: 94 U/L — SIGNIFICANT CHANGE UP (ref 40–120)
ALT FLD-CCNC: 75 U/L — HIGH (ref 10–45)
ANION GAP SERPL CALC-SCNC: 11 MMOL/L — SIGNIFICANT CHANGE UP (ref 5–17)
AST SERPL-CCNC: 82 U/L — HIGH (ref 10–40)
BASOPHILS # BLD AUTO: 0.07 K/UL — SIGNIFICANT CHANGE UP (ref 0–0.2)
BASOPHILS NFR BLD AUTO: 0.8 % — SIGNIFICANT CHANGE UP (ref 0–2)
BILIRUB DIRECT SERPL-MCNC: 0.4 MG/DL — HIGH (ref 0–0.3)
BILIRUB INDIRECT FLD-MCNC: 0.5 MG/DL — SIGNIFICANT CHANGE UP (ref 0.2–1)
BILIRUB SERPL-MCNC: 0.9 MG/DL — SIGNIFICANT CHANGE UP (ref 0.2–1.2)
BUN SERPL-MCNC: 28 MG/DL — HIGH (ref 7–23)
CALCIUM SERPL-MCNC: 8.3 MG/DL — LOW (ref 8.4–10.5)
CHLORIDE SERPL-SCNC: 104 MMOL/L — SIGNIFICANT CHANGE UP (ref 96–108)
CHOLEST SERPL-MCNC: 107 MG/DL — SIGNIFICANT CHANGE UP
CK MB CFR SERPL CALC: 2.8 NG/ML — SIGNIFICANT CHANGE UP (ref 0–6.7)
CK SERPL-CCNC: 102 U/L — SIGNIFICANT CHANGE UP (ref 30–200)
CO2 SERPL-SCNC: 21 MMOL/L — LOW (ref 22–31)
CREAT SERPL-MCNC: 1.69 MG/DL — HIGH (ref 0.5–1.3)
EGFR: 42 ML/MIN/1.73M2 — LOW
EOSINOPHIL # BLD AUTO: 0.2 K/UL — SIGNIFICANT CHANGE UP (ref 0–0.5)
EOSINOPHIL NFR BLD AUTO: 2.4 % — SIGNIFICANT CHANGE UP (ref 0–6)
FERRITIN SERPL-MCNC: 134 NG/ML — SIGNIFICANT CHANGE UP (ref 30–400)
GLUCOSE SERPL-MCNC: 103 MG/DL — HIGH (ref 70–99)
HCT VFR BLD CALC: 29.7 % — LOW (ref 39–50)
HCV AB S/CO SERPL IA: 0.04 S/CO — SIGNIFICANT CHANGE UP
HCV AB SERPL-IMP: SIGNIFICANT CHANGE UP
HDLC SERPL-MCNC: 44 MG/DL — SIGNIFICANT CHANGE UP
HGB BLD-MCNC: 9.4 G/DL — LOW (ref 13–17)
IMM GRANULOCYTES NFR BLD AUTO: 0.6 % — SIGNIFICANT CHANGE UP (ref 0–1.5)
IRON SATN MFR SERPL: 20 UG/DL — LOW (ref 45–165)
IRON SATN MFR SERPL: 7 % — LOW (ref 16–55)
LIPID PNL WITH DIRECT LDL SERPL: 43 MG/DL — SIGNIFICANT CHANGE UP
LYMPHOCYTES # BLD AUTO: 0.97 K/UL — LOW (ref 1–3.3)
LYMPHOCYTES # BLD AUTO: 11.5 % — LOW (ref 13–44)
MAGNESIUM SERPL-MCNC: 2.1 MG/DL — SIGNIFICANT CHANGE UP (ref 1.6–2.6)
MCHC RBC-ENTMCNC: 30.8 PG — SIGNIFICANT CHANGE UP (ref 27–34)
MCHC RBC-ENTMCNC: 31.6 GM/DL — LOW (ref 32–36)
MCV RBC AUTO: 97.4 FL — SIGNIFICANT CHANGE UP (ref 80–100)
MONOCYTES # BLD AUTO: 0.74 K/UL — SIGNIFICANT CHANGE UP (ref 0–0.9)
MONOCYTES NFR BLD AUTO: 8.8 % — SIGNIFICANT CHANGE UP (ref 2–14)
NEUTROPHILS # BLD AUTO: 6.37 K/UL — SIGNIFICANT CHANGE UP (ref 1.8–7.4)
NEUTROPHILS NFR BLD AUTO: 75.9 % — SIGNIFICANT CHANGE UP (ref 43–77)
NON HDL CHOLESTEROL: 63 MG/DL — SIGNIFICANT CHANGE UP
NRBC # BLD: 0 /100 WBCS — SIGNIFICANT CHANGE UP (ref 0–0)
PLATELET # BLD AUTO: 220 K/UL — SIGNIFICANT CHANGE UP (ref 150–400)
POTASSIUM SERPL-MCNC: 3.6 MMOL/L — SIGNIFICANT CHANGE UP (ref 3.5–5.3)
POTASSIUM SERPL-SCNC: 3.6 MMOL/L — SIGNIFICANT CHANGE UP (ref 3.5–5.3)
PROT SERPL-MCNC: 7 G/DL — SIGNIFICANT CHANGE UP (ref 6–8.3)
RAPID RVP RESULT: SIGNIFICANT CHANGE UP
RBC # BLD: 3.05 M/UL — LOW (ref 4.2–5.8)
RBC # FLD: 12.9 % — SIGNIFICANT CHANGE UP (ref 10.3–14.5)
SARS-COV-2 RNA SPEC QL NAA+PROBE: SIGNIFICANT CHANGE UP
SARS-COV-2 RNA SPEC QL NAA+PROBE: SIGNIFICANT CHANGE UP
SODIUM SERPL-SCNC: 136 MMOL/L — SIGNIFICANT CHANGE UP (ref 135–145)
T3FREE SERPL-MCNC: 2.5 PG/ML — SIGNIFICANT CHANGE UP (ref 1.8–4.6)
T4 FREE SERPL-MCNC: 0.93 NG/DL — LOW (ref 0.93–1.7)
TIBC SERPL-MCNC: 273 UG/DL — SIGNIFICANT CHANGE UP (ref 220–430)
TRANSFERRIN SERPL-MCNC: 235 MG/DL — SIGNIFICANT CHANGE UP (ref 200–360)
TRIGL SERPL-MCNC: 99 MG/DL — SIGNIFICANT CHANGE UP
TROPONIN T SERPL-MCNC: 0.01 NG/ML — SIGNIFICANT CHANGE UP (ref 0–0.01)
TSH SERPL-MCNC: 13.66 UIU/ML — HIGH (ref 0.27–4.2)
UIBC SERPL-MCNC: 253 UG/DL — SIGNIFICANT CHANGE UP (ref 110–370)
WBC # BLD: 8.4 K/UL — SIGNIFICANT CHANGE UP (ref 3.8–10.5)
WBC # FLD AUTO: 8.4 K/UL — SIGNIFICANT CHANGE UP (ref 3.8–10.5)

## 2022-04-06 PROCEDURE — 99223 1ST HOSP IP/OBS HIGH 75: CPT | Mod: GC

## 2022-04-06 PROCEDURE — 99223 1ST HOSP IP/OBS HIGH 75: CPT

## 2022-04-06 RX ORDER — DIAZEPAM 5 MG
5 TABLET ORAL ONCE
Refills: 0 | Status: DISCONTINUED | OUTPATIENT
Start: 2022-04-06 | End: 2022-04-06

## 2022-04-06 RX ORDER — METOPROLOL TARTRATE 50 MG
25 TABLET ORAL DAILY
Refills: 0 | Status: DISCONTINUED | OUTPATIENT
Start: 2022-04-07 | End: 2022-04-07

## 2022-04-06 RX ORDER — IRON SUCROSE 20 MG/ML
300 INJECTION, SOLUTION INTRAVENOUS EVERY 24 HOURS
Refills: 0 | Status: DISCONTINUED | OUTPATIENT
Start: 2022-04-06 | End: 2022-04-07

## 2022-04-06 RX ORDER — POTASSIUM CHLORIDE 20 MEQ
40 PACKET (EA) ORAL ONCE
Refills: 0 | Status: COMPLETED | OUTPATIENT
Start: 2022-04-06 | End: 2022-04-06

## 2022-04-06 RX ORDER — LEVOTHYROXINE SODIUM 125 MCG
50 TABLET ORAL DAILY
Refills: 0 | Status: DISCONTINUED | OUTPATIENT
Start: 2022-04-07 | End: 2022-04-07

## 2022-04-06 RX ADMIN — Medication 5 MILLIGRAM(S): at 23:43

## 2022-04-06 RX ADMIN — Medication 5 MILLIGRAM(S): at 03:44

## 2022-04-06 RX ADMIN — IRON SUCROSE 176.67 MILLIGRAM(S): 20 INJECTION, SOLUTION INTRAVENOUS at 21:16

## 2022-04-06 RX ADMIN — Medication 40 MILLIEQUIVALENT(S): at 17:05

## 2022-04-06 RX ADMIN — Medication 25 MICROGRAM(S): at 06:35

## 2022-04-06 RX ADMIN — APIXABAN 5 MILLIGRAM(S): 2.5 TABLET, FILM COATED ORAL at 23:01

## 2022-04-06 RX ADMIN — Medication 150 MILLIGRAM(S): at 12:30

## 2022-04-06 RX ADMIN — Medication 40 MILLIGRAM(S): at 06:35

## 2022-04-06 RX ADMIN — APIXABAN 5 MILLIGRAM(S): 2.5 TABLET, FILM COATED ORAL at 12:30

## 2022-04-06 NOTE — PROGRESS NOTE ADULT - PROBLEM SELECTOR PLAN 4
Hx of Afib s/p failed DCCV x4. Currently in SR  -AC: c/w Eliquis 5mg BID  -Rate control: Home Atenolol 25mg PO BID changed to Toprol 25mg QD  -Amiodarone 200mg PO BID on hold given pulm complaints pending Amio level, f/u and resume as appropriate  -EP consulted to assess need for continued Amiodarone, f/u recs

## 2022-04-06 NOTE — PHYSICAL THERAPY INITIAL EVALUATION ADULT - NAME OF CLINICIAN
RENETTA Suarez was on cymblata 60 BID   -EKG QTc 477 on admission  -can c/w cymblata newly diagnosed metastatic pancreatic cancer w/ liver mets (9/2019) s/p gem+abraxane (unable to complete full cycle)  -oncology aware of patient and appreciate their recs newly diagnosed metastatic pancreatic cancer w/ liver mets (9/2019) s/p gem+abraxane (unable to complete full cycle)  -oncology aware of patient and appreciate their recs  -holding off chemo newly diagnosed metastatic pancreatic cancer w/ liver mets (9/2019) s/p gem+abraxane (unable to complete full cycle)  -oncology aware of patient and appreciate their recs  -holding off chemo  -as per GI, unlikely stent is the issue or occluded since bilirubin is normal

## 2022-04-06 NOTE — PROGRESS NOTE ADULT - PROBLEM SELECTOR PLAN 3
BUN/Creatinine 23/1.50 Pt denies hx of CKD.  -Allopurinol 300mg QD on hold, resume as appropriate  -Renal consulted, recs: Urine lytes and UA sent, f/u results BUN/Creatinine 23/1.50 Pt denies hx of CKD.  -Allopurinol 300mg QD on hold, resume as appropriate  -Renal consulted, recs: Encourage PO hydration vs IVF for ELVI, Urine lytes and UA sent, f/u results

## 2022-04-06 NOTE — PROGRESS NOTE ADULT - PROBLEM SELECTOR PLAN 2
COVID-19 neg x2 since admission. Full RVP sent, f/u  -Procalcitonin elevated 0.19,   D-dimer 245  -CT chest/CXR as above  -Pulm Dr. Farias consulted, f/u recs

## 2022-04-06 NOTE — PROGRESS NOTE ADULT - PROBLEM SELECTOR PLAN 5
TSH 13.66, Free T4 0.925, Free T3 WNL  -Home Levothyroxine dose increased to 50mcg QD on 4/6  -Pt will need outpatient f/u in 6-8 weeks to reassess TSH

## 2022-04-06 NOTE — CONSULT NOTE ADULT - SUBJECTIVE AND OBJECTIVE BOX
Patient is a 76y old  Male who presents with a chief complaint of Generalized weakness, mild generalized muscle aches, mild HA, worsening SOB and GABRIEL (06 Apr 2022 07:22)      HPI:  A&O X4 Full Code    In terms of COVID-19 pt. denies hx of COVID-19 and has received full COVID-19 Moderna vaccination plus Moderna booster.      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 denies hx of CKD), Anemia and Depression presents to [facility] ER this afternoon, 4/5/22, complaining of  worsening fatigue, generalized weakness, mild HA, dry cough with mild myalgia and GABRIEL (with minimal exertion) X 4 days.    In speaking with patient, he reports symptoms of generalized weakness, dry cough and worsening GABRIEL occurring four days ago.  According to patient, he took a at home COVID-19 test two days ago which was negative and had a CXR last week which was also negative.   He went to seen his PCP, Dr. Bauer, this morning regarding symptoms who wanted him admitted to [facility] for SOB, Hypoxia and rales for further evaluation.  Pt. denies cardiac hx, CKD, pulmonary disease, fever, chills, chest pain, orthopnea, b/l edema, dizziness, diaphoresis, abdominal discomfort and n/v/d.  Pt. also denies recent travel and sick contact.    In ER ECG reveals Sinus [email or web address] with non specific ST-T wave changes, Troponin neg X2, CK 81, CKMB 2.1 BNP 1585, D-dimer 245, Ferritin 124, Lactate 1.2,   , , H/H 9.4/29.4,  WBC 10.4,  Na 133, Neutrophil 85.8, Procalcitonin 0.19, BUN/Creatinine 23/1.50, Total Bili 1.4, AST 67, ALT 70, COVID-19 non detected, RSV neg, CXR reveals b/l ground glass opacities with b/l small pleural effusions and CTA PE protocol b/l ground glass opacities interlobular septal, small b/l pleural effusion with stable 2014 mediastinum lymphadenopathy.    In light of patient's risk factors, elevated inflammatory markers, ELVI, abnormal chest XRay, CTA of chest patient is admitted to [facility] for suspected COVID-19 vs acute CHF exacerbation.       (05 Apr 2022 21:52)      PAST MEDICAL & SURGICAL HISTORY:  Essential hypertension  Hypertension    Atrial fibrillation  s/p cardioversion 2010 and 2014  Pt. reports 4 DCCV  Now on Amiodarone 200mg PO bid and Eliquis 5mg PO bid  Last DCCV 4 yrs ago at [facility]    Crohn&#x27;s disease  s/p partial resection of ileum    Hyperlipidemia    Hypothyroidism    ELVI (acute kidney injury)  No hx of CKD   Creatinine 1.5 on this admisison 4/5/22    History of depression  On Venlafaxine ER 150mg PO bid    Other specified personal history presenting hazards to health  History of bowel resection    H/O knee surgery    History of cataract surgery        Allergies:  penicillin (Angioedema)      Home Medications:   acetaminophen     Tablet .. 650 milliGRAM(s) Oral every 6 hours PRN  apixaban 5 milliGRAM(s) Oral every 12 hours  ATENolol  Tablet 25 milliGRAM(s) Oral two times a day  furosemide   Injectable 40 milliGRAM(s) IV Push two times a day  ipratropium    for Nebulization 500 MICROGram(s) Nebulizer every 6 hours PRN  levothyroxine 25 MICROGram(s) Oral daily  venlafaxine XR. 150 milliGRAM(s) Oral daily      Hospital Medications:   MEDICATIONS  (STANDING):  apixaban 5 milliGRAM(s) Oral every 12 hours  ATENolol  Tablet 25 milliGRAM(s) Oral two times a day  furosemide   Injectable 40 milliGRAM(s) IV Push two times a day  levothyroxine 25 MICROGram(s) Oral daily  venlafaxine XR. 150 milliGRAM(s) Oral daily        ROS:  RESPIRATORY: No shortness of breath  CARDIOVASCULAR: No Chest pain  MUSCULOSKELETAL: No leg swelling  All other ROS negative    VITALS:  T(F): 98.1 (04-06-22 @ 06:22), Max: 98.3 (04-05-22 @ 19:21)  HR: 48 (04-06-22 @ 05:45)  BP: 157/70 (04-06-22 @ 05:45)  RR: 18 (04-06-22 @ 00:00)  SpO2: 95% (04-06-22 @ 05:45)  Wt(kg): --    04-05 @ 07:01  -  04-06 @ 07:00  --------------------------------------------------------  IN: 300 mL / OUT: 1010 mL / NET: -710 mL      Height (cm): 185.4 (04-05 @ 21:57)  Weight (kg): 99.8 (04-05 @ 13:28)  BMI (kg/m2): 29 (04-05 @ 21:57)  BSA (m2): 2.24 (04-05 @ 21:57)  CAPILLARY BLOOD GLUCOSE      PHYSICAL EXAM:  GENERAL: Alert, awake, oriented x3   HEENT: EOMI, neck supple, no JVP  CHEST/LUNG: Bilateral clear breath sounds, no rales  HEART: Regular rhythm, bradycardia, no murmur  ABDOMEN: Soft, nontender, non distended  : No flank or supra pubic tenderness.  EXTREMITIES: No pedal edema, no calf tenderness  SKIN: No rash or skin lesion       LABS:  04-06    136  |  104  |  28<H>  ----------------------------<  103<H>  3.6   |  21<L>  |  1.69<H>    Ca    8.3<L>      06 Apr 2022 07:56  Mg     2.1     04-06    TPro  7.0  /  Alb  3.2<L>  /  TBili  0.9  /  DBili  0.4<H>  /  AST  82<H>  /  ALT  75<H>  /  AlkPhos  94  04-06    Creatinine Trend: 1.69 <--, 1.50 <--                        9.4    8.40  )-----------( 220      ( 06 Apr 2022 07:56 )             29.7       Urine Studies: pending      04-05-22 @ 07:01  -  04-06-22 @ 07:00  --------------------------------------------------------  IN: 300 mL / OUT: 1010 mL / NET: -710 mL          Assessment/Plan:   ELVI  Baseline Creat ~1.4. On admission BUN/Cr 23/1.50 -> 28/1.69; currently on lasix 40 mg BID and had decreased PO intake several days prior to admission. No history of CKD. 24 hr urine output ~1L. Most likely prerenal.     Plan:   - Rx urine lytes: Uurea, Ucreatinine, U protein/creat ratio, Rebeca  - F/u TTE     Thank you for the opportunity to participate in the care of your patient. The nephrology service remains available to assist with any questions or concerns. Please feel free to reach us by paging the on-call nephrology fellow for urgent issues or as below.     Keyla Rodriguez M.D.   PGY-5  Pager: [phone] Patient is a 76y old  Male who presents with a chief complaint of Generalized weakness, mild generalized muscle aches, mild HA, worsening SOB and GABRIEL (06 Apr 2022 07:22)      HPI:  A&O X4 Full Code    In terms of COVID-19 pt. denies hx of COVID-19 and has received full COVID-19 Moderna vaccination plus Moderna booster.    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 denies hx of CKD), Anemia and Depression presents to [facility] ER this afternoon, 4/5/22, complaining of  worsening fatigue, generalized weakness, mild HA, dry cough with mild myalgia and GABRIEL (with minimal exertion) X 4 days.    In speaking with patient, he reports symptoms of generalized weakness, dry cough and worsening GABRIEL occurring four days ago.  According to patient, he took a at home COVID-19 test two days ago which was negative and had a CXR last week which was also negative.   He went to seen his PCP, Dr. Bauer, this morning regarding symptoms who wanted him admitted to [facility] for SOB, Hypoxia and rales for further evaluation.  Pt. denies cardiac hx, CKD, pulmonary disease, fever, chills, chest pain, orthopnea, b/l edema, dizziness, diaphoresis, abdominal discomfort and n/v/d.  Pt. also denies recent travel and sick contact.    In light of patient's risk factors, elevated inflammatory markers, ELVI, abnormal chest XRay, CTA of chest patient is admitted to [facility] for suspected COVID-19 vs acute CHF exacerbation.  (05 Apr 2022 21:52)      PAST MEDICAL & SURGICAL HISTORY:  Essential hypertension  Hypertension    Atrial fibrillation  s/p cardioversion 2010 and 2014  Pt. reports 4 DCCV  Now on Amiodarone 200mg PO bid and Eliquis 5mg PO bid  Last DCCV 4 yrs ago at [facility]    Crohn&#x27;s disease  s/p partial resection of ileum    Hyperlipidemia    Hypothyroidism    ELVI (acute kidney injury)  No hx of CKD   Creatinine 1.5 on this admisison 4/5/22    History of depression  On Venlafaxine ER 150mg PO bid    Other specified personal history presenting hazards to health  History of bowel resection    H/O knee surgery    History of cataract surgery        Allergies:  penicillin (Angioedema)      Home Medications:   acetaminophen     Tablet .. 650 milliGRAM(s) Oral every 6 hours PRN  apixaban 5 milliGRAM(s) Oral every 12 hours  ATENolol  Tablet 25 milliGRAM(s) Oral two times a day  furosemide   Injectable 40 milliGRAM(s) IV Push two times a day  ipratropium    for Nebulization 500 MICROGram(s) Nebulizer every 6 hours PRN  levothyroxine 25 MICROGram(s) Oral daily  venlafaxine XR. 150 milliGRAM(s) Oral daily      Hospital Medications:   MEDICATIONS  (STANDING):  apixaban 5 milliGRAM(s) Oral every 12 hours  ATENolol  Tablet 25 milliGRAM(s) Oral two times a day  furosemide   Injectable 40 milliGRAM(s) IV Push two times a day  levothyroxine 25 MICROGram(s) Oral daily  venlafaxine XR. 150 milliGRAM(s) Oral daily        ROS:  RESPIRATORY: No shortness of breath  CARDIOVASCULAR: No Chest pain  MUSCULOSKELETAL: No leg swelling  All other ROS negative    VITALS:  T(F): 98.1 (04-06-22 @ 06:22), Max: 98.3 (04-05-22 @ 19:21)  HR: 48 (04-06-22 @ 05:45)  BP: 157/70 (04-06-22 @ 05:45)  RR: 18 (04-06-22 @ 00:00)  SpO2: 95% (04-06-22 @ 05:45)  Wt(kg): --    04-05 @ 07:01  -  04-06 @ 07:00  --------------------------------------------------------  IN: 300 mL / OUT: 1010 mL / NET: -710 mL      Height (cm): 185.4 (04-05 @ 21:57)  Weight (kg): 99.8 (04-05 @ 13:28)  BMI (kg/m2): 29 (04-05 @ 21:57)  BSA (m2): 2.24 (04-05 @ 21:57)  CAPILLARY BLOOD GLUCOSE      PHYSICAL EXAM:  GENERAL: Alert, awake, oriented x3   HEENT: EOMI, neck supple, no JVP  CHEST/LUNG: Bilateral clear breath sounds, no rales  HEART: Regular rhythm, bradycardia, no murmur  ABDOMEN: Soft, nontender, non distended  : No flank or supra pubic tenderness.  EXTREMITIES: No pedal edema, no calf tenderness  SKIN: No rash or skin lesion       LABS:  04-06    136  |  104  |  28<H>  ----------------------------<  103<H>  3.6   |  21<L>  |  1.69<H>    Ca    8.3<L>      06 Apr 2022 07:56  Mg     2.1     04-06    TPro  7.0  /  Alb  3.2<L>  /  TBili  0.9  /  DBili  0.4<H>  /  AST  82<H>  /  ALT  75<H>  /  AlkPhos  94  04-06    Creatinine Trend: 1.69 <--, 1.50 <--                        9.4    8.40  )-----------( 220      ( 06 Apr 2022 07:56 )             29.7       Urine Studies: pending      04-05-22 @ 07:01  -  04-06-22 @ 07:00  --------------------------------------------------------  IN: 300 mL / OUT: 1010 mL / NET: -710 mL          Assessment/Plan:   ELVI  Baseline Creat ~1.4. On admission BUN/Cr 23/1.50 -> 28/1.69; currently on lasix 40 mg BID and had decreased PO intake several days prior to admission. No history of CKD. 24 hr urine output ~1L. Most likely prerenal.     Plan:   - Rx UA and urine lytes: Uurea, Ucreatinine, U protein/creat ratio, Rebeca  - F/u TTE   - Encourage PO/fluid intake     Thank you for the opportunity to participate in the care of your patient. The nephrology service remains available to assist with any questions or concerns. Please feel free to reach us by paging the on-call nephrology fellow for urgent issues or as below.     Keyla Rodriguez M.D.   PGY-5  Pager: [phone] Patient is a 76y old  Male who presents with a chief complaint of Generalized weakness, mild generalized muscle aches, mild HA, worsening SOB and GABRIEL (06 Apr 2022 07:22)      HPI:  A&O X4 Full Code    In terms of COVID-19 pt. denies hx of COVID-19 and has received full COVID-19 Moderna vaccination plus Moderna booster.    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 denies hx of CKD), Anemia and Depression presents to [facility] ER this afternoon, 4/5/22, complaining of  worsening fatigue, generalized weakness, mild HA, dry cough with mild myalgia and GABRIEL (with minimal exertion) X 4 days.    In speaking with patient, he reports symptoms of generalized weakness, dry cough and worsening GABRIEL occurring four days ago.  According to patient, he took a at home COVID-19 test two days ago which was negative and had a CXR last week which was also negative.   He went to seen his PCP, Dr. Bauer, this morning regarding symptoms who wanted him admitted to [facility] for SOB, Hypoxia and rales for further evaluation.  Pt. denies cardiac hx, CKD, pulmonary disease, fever, chills, chest pain, orthopnea, b/l edema, dizziness, diaphoresis, abdominal discomfort and n/v/d.  Pt. also denies recent travel and sick contact.    In light of patient's risk factors, elevated inflammatory markers, ELVI, abnormal chest XRay, CTA of chest patient is admitted to [facility] for suspected COVID-19 vs acute CHF exacerbation.  (05 Apr 2022 21:52)      PAST MEDICAL & SURGICAL HISTORY:  Essential hypertension  Hypertension    Atrial fibrillation  s/p cardioversion 2010 and 2014  Pt. reports 4 DCCV  Now on Amiodarone 200mg PO bid and Eliquis 5mg PO bid  Last DCCV 4 yrs ago at [facility]    Crohn&#x27;s disease  s/p partial resection of ileum    Hyperlipidemia    Hypothyroidism    ELVI (acute kidney injury)  No hx of CKD   Creatinine 1.5 on this admisison 4/5/22    History of depression  On Venlafaxine ER 150mg PO bid    Other specified personal history presenting hazards to health  History of bowel resection    H/O knee surgery    History of cataract surgery        Allergies:  penicillin (Angioedema)      Home Medications:   acetaminophen     Tablet .. 650 milliGRAM(s) Oral every 6 hours PRN  apixaban 5 milliGRAM(s) Oral every 12 hours  ATENolol  Tablet 25 milliGRAM(s) Oral two times a day  furosemide   Injectable 40 milliGRAM(s) IV Push two times a day  ipratropium    for Nebulization 500 MICROGram(s) Nebulizer every 6 hours PRN  levothyroxine 25 MICROGram(s) Oral daily  venlafaxine XR. 150 milliGRAM(s) Oral daily      Hospital Medications:   MEDICATIONS  (STANDING):  apixaban 5 milliGRAM(s) Oral every 12 hours  ATENolol  Tablet 25 milliGRAM(s) Oral two times a day  furosemide   Injectable 40 milliGRAM(s) IV Push two times a day  levothyroxine 25 MICROGram(s) Oral daily  venlafaxine XR. 150 milliGRAM(s) Oral daily        ROS:  RESPIRATORY: No shortness of breath  CARDIOVASCULAR: No Chest pain  MUSCULOSKELETAL: No leg swelling  All other ROS negative    VITALS:  T(F): 98.1 (04-06-22 @ 06:22), Max: 98.3 (04-05-22 @ 19:21)  HR: 48 (04-06-22 @ 05:45)  BP: 157/70 (04-06-22 @ 05:45)  RR: 18 (04-06-22 @ 00:00)  SpO2: 95% (04-06-22 @ 05:45)  Wt(kg): --    04-05 @ 07:01  -  04-06 @ 07:00  --------------------------------------------------------  IN: 300 mL / OUT: 1010 mL / NET: -710 mL      Height (cm): 185.4 (04-05 @ 21:57)  Weight (kg): 99.8 (04-05 @ 13:28)  BMI (kg/m2): 29 (04-05 @ 21:57)  BSA (m2): 2.24 (04-05 @ 21:57)  CAPILLARY BLOOD GLUCOSE      PHYSICAL EXAM:  GENERAL: Alert, awake, oriented x3   HEENT: EOMI, neck supple, no JVP  CHEST/LUNG: Bilateral clear breath sounds, no rales  HEART: Regular rhythm, bradycardia, no murmur  ABDOMEN: Soft, nontender, non distended  : No flank or supra pubic tenderness.  EXTREMITIES: No pedal edema, no calf tenderness  SKIN: No rash or skin lesion       LABS:  04-06    136  |  104  |  28<H>  ----------------------------<  103<H>  3.6   |  21<L>  |  1.69<H>    Ca    8.3<L>      06 Apr 2022 07:56  Mg     2.1     04-06    TPro  7.0  /  Alb  3.2<L>  /  TBili  0.9  /  DBili  0.4<H>  /  AST  82<H>  /  ALT  75<H>  /  AlkPhos  94  04-06    Creatinine Trend: 1.69 <--, 1.50 <--                        9.4    8.40  )-----------( 220      ( 06 Apr 2022 07:56 )             29.7       Urine Studies: pending      04-05-22 @ 07:01  -  04-06-22 @ 07:00  --------------------------------------------------------  IN: 300 mL / OUT: 1010 mL / NET: -710 mL          Assessment/Plan:   This is a 77 y/o M with PMHx of Crohn's Disease s/p bowel resection, Atrial Fibrillation/Flutter s/p multiple DCCV on Amiodarone / Eliquis, HLD, HTN, Gout, ELVI (creatinine on admission 1.5; pt. denies hx of CKD), Anemia and Depression presents to [facility] ER this afternoon, 4/5/22, complaining of  worsening fatigue, generalized weakness, mild HA, dry cough with mild myalgia and GABRIEL (with minimal exertion) X 4 days. Baseline Creat ~1.4. On admission BUN/Cr 23/1.50 -> 28/1.69; currently on lasix 40 mg BID and had decreased PO intake several days prior to admission. No history of CKD. 24 hr urine output ~1L. Most likely prerenal 2/2 lasix and poor PO intake .     #ELVI Plan:   - Rx UA and urine lytes: Uurea, Ucreatinine, U protein/creat ratio, Rebeca  - F/u TTE   - Encourage PO/fluid intake   - BP, wnl    #Anemia  - Hgb stable ~9.4  - Cont monitor  - Transfuse if <7       Thank you for the opportunity to participate in the care of your patient. The nephrology service remains available to assist with any questions or concerns. Please feel free to reach us by paging the on-call nephrology fellow for urgent issues or as below.     Jyoti Caraballo, DO  PGY-1

## 2022-04-06 NOTE — CONSULT NOTE ADULT - SUBJECTIVE AND OBJECTIVE BOX
PULMONARY SERVICE INITIAL CONSULT NOTE    HPI:  A&O X4 Full Code    In terms of COVID-19 pt. denies hx of COVID-19 and has received full COVID-19 Moderna vaccination plus Moderna booster.      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 denies hx of CKD), Anemia and Depression presents to [facility] ER this afternoon, 4/5/22, complaining of  worsening fatigue, generalized weakness, mild HA, dry cough with mild myalgia and GABRIEL (with minimal exertion) X 4 days.    In speaking with patient, he reports symptoms of generalized weakness, dry cough and worsening GABRIEL occurring four days ago.  According to patient, he took a at home COVID-19 test two days ago which was negative and had a CXR last week which was also negative.   He went to seen his PCP, Dr. Bauer, this morning regarding symptoms who wanted him admitted to [facility] for SOB, Hypoxia and rales for further evaluation.  Pt. denies cardiac hx, CKD, pulmonary disease, fever, chills, chest pain, orthopnea, b/l edema, dizziness, diaphoresis, abdominal discomfort and n/v/d.  Pt. also denies recent travel and sick contact.    In ER ECG reveals Sinus [email or web address]@[id number] with non specific ST-T wave changes, Troponin neg X2, CK 81, CKMB 2.1 BNP 1585, D-dimer 245, Ferritin 124, Lactate 1.2,   , , H/H 9.4/29.4,  WBC 10.4,  Na 133, Neutrophil 85.8, Procalcitonin 0.19, BUN/Creatinine 23/1.50, Total Bili 1.4, AST 67, ALT 70, COVID-19 non detected, RSV neg, CXR reveals b/l ground glass opacities with b/l small pleural effusions and CTA PE protocol b/l ground glass opacities interlobular septal, small b/l pleural effusion with stable 2014 mediastinum lymphadenopathy.    In light of patient's risk factors, elevated inflammatory markers, ELVI, abnormal chest XRay, CTA of chest patient is admitted to [facility] for suspected COVID-19 vs acute CHF exacerbation.       (05 Apr 2022 21:52)      REVIEW OF SYSTEMS:  All additional ROS negative.    PAST MEDICAL & SURGICAL HISTORY:  Essential hypertension  Hypertension    Atrial fibrillation  s/p cardioversion 2010 and 2014  Pt. reports 4 DCCV  Now on Amiodarone 200mg PO bid and Eliquis 5mg PO bid  Last DCCV 4 yrs ago at [facility]    Crohn&#x27;s disease  s/p partial resection of ileum    Hyperlipidemia    Hypothyroidism    ELVI (acute kidney injury)  No hx of CKD   Creatinine 1.5 on this admisison 4/5/22    History of depression  On Venlafaxine ER 150mg PO bid    Other specified personal history presenting hazards to health  History of bowel resection    H/O knee surgery    History of cataract surgery        FAMILY HISTORY:      SOCIAL HISTORY:  Smoking Status: [ ] Current, [ ] Former, [ x] Never    MEDICATIONS:  Pulmonary:  ipratropium    for Nebulization 500 MICROGram(s) Nebulizer every 6 hours PRN    Antimicrobials:    Anticoagulants:  apixaban 5 milliGRAM(s) Oral every 12 hours    Onc:    GI/:    Endocrine:    Cardiac:    Other Medications:  acetaminophen     Tablet .. 650 milliGRAM(s) Oral every 6 hours PRN  potassium chloride    Tablet ER 40 milliEquivalent(s) Oral once  venlafaxine XR. 150 milliGRAM(s) Oral daily      Allergies    penicillin (Angioedema)    Intolerances        Vital Signs Last 24 Hrs  T(C): 36.4 (06 Apr 2022 14:22), Max: 36.8 (05 Apr 2022 16:57)  T(F): 97.5 (06 Apr 2022 14:22), Max: 98.3 (05 Apr 2022 19:21)  HR: 46 (06 Apr 2022 15:57) (45 - 50)  BP: 111/56 (06 Apr 2022 15:30) (111/56 - 157/70)  BP(mean): --  RR: 18 (06 Apr 2022 12:00) (18 - 21)  SpO2: 93% (06 Apr 2022 15:57) (93% - 97%)    04-05 @ 07:01  -  04-06 @ 07:00  --------------------------------------------------------  IN: 300 mL / OUT: 1010 mL / NET: -710 mL    04-06 @ 07:01  -  04-06 @ 16:48  --------------------------------------------------------  IN: 180 mL / OUT: 600 mL / NET: -420 mL          PHYSICAL EXAM:  Constitutional: WDWN  Head: NC/AT  EENT: PERRL, anicteric sclera; oropharynx clear, MMM  Neck: supple, no appreciable JVD  Respiratory: bibasilar rales  Cardiovascular: +S1/S2, RRR  Gastrointestinal: soft, NT/ND  Extremities: WWP; no edema, clubbing or cyanosis  Vascular: 2+ radial pulses B/L  Neurological: awake and alert; PEARSON    LABS:      CBC Full  -  ( 06 Apr 2022 07:56 )  WBC Count : 8.40 K/uL  RBC Count : 3.05 M/uL  Hemoglobin : 9.4 g/dL  Hematocrit : 29.7 %  Platelet Count - Automated : 220 K/uL  Mean Cell Volume : 97.4 fl  Mean Cell Hemoglobin : 30.8 pg  Mean Cell Hemoglobin Concentration : 31.6 gm/dL  Auto Neutrophil # : 6.37 K/uL  Auto Lymphocyte # : 0.97 K/uL  Auto Monocyte # : 0.74 K/uL  Auto Eosinophil # : 0.20 K/uL  Auto Basophil # : 0.07 K/uL  Auto Neutrophil % : 75.9 %  Auto Lymphocyte % : 11.5 %  Auto Monocyte % : 8.8 %  Auto Eosinophil % : 2.4 %  Auto Basophil % : 0.8 %    04-06    136  |  104  |  28<H>  ----------------------------<  103<H>  3.6   |  21<L>  |  1.69<H>    Ca    8.3<L>      06 Apr 2022 07:56  Mg     2.1     04-06    TPro  7.0  /  Alb  3.2<L>  /  TBili  0.9  /  DBili  0.4<H>  /  AST  82<H>  /  ALT  75<H>  /  AlkPhos  94  04-06    PT/INR - ( 05 Apr 2022 20:28 )   PT: 26.0 sec;   INR: 2.17          PTT - ( 05 Apr 2022 20:28 )  PTT:34.2 sec                  RADIOLOGY & ADDITIONAL STUDIES:

## 2022-04-06 NOTE — PHYSICAL THERAPY INITIAL EVALUATION ADULT - ADDITIONAL COMMENTS
Patient lives in elevator apartment building with his wife. PTA patient was independent with all mobility and ADLs

## 2022-04-06 NOTE — CONSULT NOTE ADULT - TIME BILLING
Patient seen and examined with house-staff during bedside rounds.  Resident note read, including vitals, physical findings, laboratory data, and radiological reports.   Revisions included below.  Direct personal management at bed side and extensive interpretation of the data.  Plan was outlined and discussed in details with the housestaff.  Decision making of high complexity  Action taken for acute disease activity to reflect the level of care provided:  - medication reconciliation  - review laboratory data  Patient is clinically stable.  Patient had 3 tests for Covid negative.  The CT scan finding is unlikely related to Covid infection.  The patient improved with diuresis.  Oxygen saturation is stable with ambulation.  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  We will follow-up with PFT as an outpatient

## 2022-04-06 NOTE — CONSULT NOTE ADULT - ASSESSMENT
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 denies hx of CKD), Anemia and Depression who presented to [facility] for dyspnea on exertion.     Abnormal CT   - Patient's CT scan with small bilateral pleural effusions, smooth interlobular septal thickening and ground glass opacities. The differential for these findings include decompensated heart failure as well as possible amiodarone induced lung injury. The opacities are not typical for COVID 19 PNA and the patient has been vaccinated/boosted. Additionally the patient has had multiple negative COVID swabs and home tests. His symptoms have improved since admission after diuresis. All of these taken together suggest that an element of his dyspnea is related to pulmonary edema.   - Given that the patient is on room air there would be no treatments recommended were he to have COVID 19. Were the patient to have amiodarone induced pneumonitis the treatment would be withholding amiodarone as you are - he does not meet indication for glucocorticoids given he is on room air.   - dyspnea is likely multifactorial between cardiac, low HCT, thyroid dysfunction and bradycardia   - f/u echocardiogram   - full PFTs and repeat xray can be performed in the clinic with Dr. Farias to monitor for patient's lung function

## 2022-04-06 NOTE — CONSULT NOTE ADULT - ATTENDING COMMENTS
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  no urinary symptoms; no LE edema, flank pain frothy urine or hematuria  non oliguric  POCUs without B lines on chest exam  No Uretention and kidneys normal size, ? increased echogenicity    does not need diuresis-  okay to run mild + balance  reviewed with Dr. Zamudio

## 2022-04-06 NOTE — PHYSICAL THERAPY INITIAL EVALUATION ADULT - PERTINENT HX OF CURRENT PROBLEM, REHAB EVAL
Patient is a 76 year old male presents to [facility] ER this afternoon, 4/5/22, complaining of  worsening fatigue, generalized weakness, mild HA, dry cough with mild myalgia and GABRIEL (with minimal exertion) X 4 days..

## 2022-04-06 NOTE — PROGRESS NOTE ADULT - PROBLEM SELECTOR PLAN 1
P/w worsening fatigue, generalized weakness, dry cough and GABRIEL x4 days. Cardiologist: Dr. Denny ([facility])  -Currently satting 95% on 2L NC, comfortable  -BNP 1585. CXR: Cardiomegaly, b/l opacities.  -CT PE protocol: No PE, mild b/l ground glass opacities w/ interlobar septal thickening c/w CHF, small b/l pleural effusions, stable aortic dilation of aortic arch and descending aorta, increase main pulm artery dilation  -EKG: Sinus bruce @52bpm w/ non-specific ST changes. CE neg x3  -Echo ordered, f/u  -s/p Lasix 40mg IVP x2. I/O: Net neg 1.6L this admission  -Consider ischemic eval once euvolemic  -Core measures, strict I/O's daily weights, 1L PO restriction, VS per routine, cont tele/pulse ox P/w worsening fatigue, generalized weakness, dry cough and GABRIEL x4 days. Cardiologist: Dr. Denny ([facility])  -Currently satting 95% on 2L NC, comfortable  -BNP 1585. CXR: Cardiomegaly, b/l opacities.  -CT PE protocol: No PE, mild b/l ground glass opacities w/ interlobar septal thickening c/w CHF, small b/l pleural effusions, stable aortic dilation of aortic arch and descending aorta, increase main pulm artery dilation  -EKG: Sinus bruce @52bpm w/ non-specific ST changes. CE neg x3  -Echo ordered, f/u  -s/p Lasix 40mg IVP x2. I/O: Net neg 1.6L this admission  -Core measures, strict I/O's daily weights, 1L PO restriction, VS per routine, cont tele/pulse ox P/w worsening fatigue, generalized weakness, dry cough and GABRIEL x4 days. Cardiologist: Dr. Denny ([facility])  -Currently satting 95% on 2L NC, comfortable  -BNP 1585. CXR: Cardiomegaly, b/l opacities.  -CT PE protocol: No PE, mild b/l ground glass opacities w/ interlobar septal thickening c/w CHF, small b/l pleural effusions, stable aortic dilation of aortic arch and descending aorta, increase main pulm artery dilation  -EKG: Sinus bruce @52bpm w/ non-specific ST changes. CE neg x3  -Echo ordered, f/u  -s/p Lasix 40mg IVP x2, no further diuretic ordered per renal recs. I/O: Net neg 1.6L this admission  -Core measures, strict I/O's daily weights, 1L PO restriction, VS per routine, cont tele/pulse ox

## 2022-04-06 NOTE — PROGRESS NOTE ADULT - PROBLEM SELECTOR PLAN 8
VTE ppx: Eliquis (for Afib)  Diet: DASH 1L PO restriction  PT consulted, f/u recs  Dispo: Pending clinical progression    Case d/w Dr. Zamudio VTE ppx: Eliquis (for Afib)  Diet: DASH. 1L fluid restriction lifted per renal recs  PT consulted, f/u recs  Dispo: Pending clinical progression    Case d/w Dr. Zamudio VTE ppx: Eliquis (for Afib)  Diet: DASH renal/protein restriction. 1L fluid restriction lifted per renal recs  PT consulted, f/u recs  Dispo: Pending clinical progression    Case d/w Dr. Zamudio

## 2022-04-06 NOTE — PHYSICAL THERAPY INITIAL EVALUATION ADULT - GAIT DEVIATIONS NOTED, PT EVAL
during ambulation without AD patient was unsteady with mild scissoring and 1 LOB requiring Vilma to regain balance. With RW patient more steady without LOB during ambulation without AD patient was unsteady with mild scissoring and 1 LOB requiring Vilma to regain balance. With RW patient more steady without LOB. Patients sats between 93-96% throughout ambulation trials on RA

## 2022-04-06 NOTE — PHYSICAL THERAPY INITIAL EVALUATION ADULT - GROSSLY INTACT, SENSORY
grossly intact except bilateral plantar surface of feet slightly diminished to light touch sensation

## 2022-04-06 NOTE — PROGRESS NOTE ADULT - ASSESSMENT
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 denies hx of CKD), Anemia and Depression presents to [facility] ER this afternoon, 4/5/22, complaining of  worsening fatigue, generalized weakness, mild HA, dry cough with mild myalgia and GABRIEL (with minimal exertion) X 4 days.  In light of patient's risk factors, elevated inflammatory markers, ELVI, abnormal chest XRay, CTA of chest patient is admitted to [facility] for suspected COVID-19 vs acute CHF exacerbation.  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 denies hx of CKD), Anemia and Depression presents to [facility] ER this afternoon, 4/5/22, complaining of  worsening fatigue, generalized weakness, mild HA, dry cough with mild myalgia and GABRIEL (with minimal exertion) X 4 days.  In light of patient's risk factors, elevated inflammatory markers, ELVI, abnormal chest XRay, CTA of chest patient is admitted to [facility] for suspected COVID-19 vs acute CHF exacerbation.

## 2022-04-06 NOTE — PROGRESS NOTE ADULT - PROBLEM SELECTOR PLAN 7
LDL 43.  -Atorvastatin 20mg QHS (TIC for home Rosuvastatin 5mg QHS) on hold as below    #Transaminitis:  -AST 2x NL, ALT 1.5x NL, Alk Phos WNL  -Statin on hold, trend LFT's daily and resume as appropriate

## 2022-04-07 ENCOUNTER — TRANSCRIPTION ENCOUNTER (OUTPATIENT)
Age: 77
End: 2022-04-07

## 2022-04-07 VITALS — TEMPERATURE: 98 F

## 2022-04-07 LAB
ALBUMIN SERPL ELPH-MCNC: 2.9 G/DL — LOW (ref 3.3–5)
ALP SERPL-CCNC: 104 U/L — SIGNIFICANT CHANGE UP (ref 40–120)
ALT FLD-CCNC: 83 U/L — HIGH (ref 10–45)
ANION GAP SERPL CALC-SCNC: 10 MMOL/L — SIGNIFICANT CHANGE UP (ref 5–17)
APPEARANCE UR: CLEAR — SIGNIFICANT CHANGE UP
AST SERPL-CCNC: 89 U/L — HIGH (ref 10–40)
BASOPHILS # BLD AUTO: 0.07 K/UL — SIGNIFICANT CHANGE UP (ref 0–0.2)
BASOPHILS NFR BLD AUTO: 1 % — SIGNIFICANT CHANGE UP (ref 0–2)
BILIRUB SERPL-MCNC: 0.8 MG/DL — SIGNIFICANT CHANGE UP (ref 0.2–1.2)
BILIRUB UR-MCNC: NEGATIVE — SIGNIFICANT CHANGE UP
BUN SERPL-MCNC: 27 MG/DL — HIGH (ref 7–23)
CALCIUM SERPL-MCNC: 8.2 MG/DL — LOW (ref 8.4–10.5)
CHLORIDE SERPL-SCNC: 107 MMOL/L — SIGNIFICANT CHANGE UP (ref 96–108)
CO2 SERPL-SCNC: 22 MMOL/L — SIGNIFICANT CHANGE UP (ref 22–31)
COLOR SPEC: YELLOW — SIGNIFICANT CHANGE UP
CREAT ?TM UR-MCNC: 126 MG/DL — SIGNIFICANT CHANGE UP
CREAT SERPL-MCNC: 1.37 MG/DL — HIGH (ref 0.5–1.3)
DIFF PNL FLD: NEGATIVE — SIGNIFICANT CHANGE UP
EGFR: 54 ML/MIN/1.73M2 — LOW
EOSINOPHIL # BLD AUTO: 0.28 K/UL — SIGNIFICANT CHANGE UP (ref 0–0.5)
EOSINOPHIL NFR BLD AUTO: 3.9 % — SIGNIFICANT CHANGE UP (ref 0–6)
GLUCOSE SERPL-MCNC: 85 MG/DL — SIGNIFICANT CHANGE UP (ref 70–99)
GLUCOSE UR QL: NEGATIVE — SIGNIFICANT CHANGE UP
HCT VFR BLD CALC: 30.9 % — LOW (ref 39–50)
HGB BLD-MCNC: 9.6 G/DL — LOW (ref 13–17)
IMM GRANULOCYTES NFR BLD AUTO: 0.4 % — SIGNIFICANT CHANGE UP (ref 0–1.5)
KETONES UR-MCNC: NEGATIVE — SIGNIFICANT CHANGE UP
LEUKOCYTE ESTERASE UR-ACNC: NEGATIVE — SIGNIFICANT CHANGE UP
LYMPHOCYTES # BLD AUTO: 1.05 K/UL — SIGNIFICANT CHANGE UP (ref 1–3.3)
LYMPHOCYTES # BLD AUTO: 14.6 % — SIGNIFICANT CHANGE UP (ref 13–44)
MAGNESIUM SERPL-MCNC: 2.2 MG/DL — SIGNIFICANT CHANGE UP (ref 1.6–2.6)
MCHC RBC-ENTMCNC: 31.1 GM/DL — LOW (ref 32–36)
MCHC RBC-ENTMCNC: 31.6 PG — SIGNIFICANT CHANGE UP (ref 27–34)
MCV RBC AUTO: 101.6 FL — HIGH (ref 80–100)
MONOCYTES # BLD AUTO: 0.68 K/UL — SIGNIFICANT CHANGE UP (ref 0–0.9)
MONOCYTES NFR BLD AUTO: 9.5 % — SIGNIFICANT CHANGE UP (ref 2–14)
NEUTROPHILS # BLD AUTO: 5.07 K/UL — SIGNIFICANT CHANGE UP (ref 1.8–7.4)
NEUTROPHILS NFR BLD AUTO: 70.6 % — SIGNIFICANT CHANGE UP (ref 43–77)
NITRITE UR-MCNC: NEGATIVE — SIGNIFICANT CHANGE UP
NRBC # BLD: 0 /100 WBCS — SIGNIFICANT CHANGE UP (ref 0–0)
OB PNL STL: POSITIVE
PH UR: 5.5 — SIGNIFICANT CHANGE UP (ref 5–8)
PLATELET # BLD AUTO: 228 K/UL — SIGNIFICANT CHANGE UP (ref 150–400)
POTASSIUM SERPL-MCNC: 3.9 MMOL/L — SIGNIFICANT CHANGE UP (ref 3.5–5.3)
POTASSIUM SERPL-SCNC: 3.9 MMOL/L — SIGNIFICANT CHANGE UP (ref 3.5–5.3)
PROT ?TM UR-MCNC: 17 MG/DL — HIGH (ref 0–12)
PROT SERPL-MCNC: 6.7 G/DL — SIGNIFICANT CHANGE UP (ref 6–8.3)
PROT UR-MCNC: NEGATIVE MG/DL — SIGNIFICANT CHANGE UP
PROT/CREAT UR-RTO: 0.1 RATIO — SIGNIFICANT CHANGE UP (ref 0–0.2)
RBC # BLD: 3.04 M/UL — LOW (ref 4.2–5.8)
RBC # FLD: 13.2 % — SIGNIFICANT CHANGE UP (ref 10.3–14.5)
SODIUM SERPL-SCNC: 139 MMOL/L — SIGNIFICANT CHANGE UP (ref 135–145)
SODIUM UR-SCNC: 41 MMOL/L — SIGNIFICANT CHANGE UP
SP GR SPEC: 1.02 — SIGNIFICANT CHANGE UP (ref 1–1.03)
URATE UR-MCNC: 44.6 MG/DL — SIGNIFICANT CHANGE UP
UROBILINOGEN FLD QL: 2 E.U./DL
UUN UR-MCNC: 1208 MG/DL — SIGNIFICANT CHANGE UP
WBC # BLD: 7.18 K/UL — SIGNIFICANT CHANGE UP (ref 3.8–10.5)
WBC # FLD AUTO: 7.18 K/UL — SIGNIFICANT CHANGE UP (ref 3.8–10.5)

## 2022-04-07 PROCEDURE — 99231 SBSQ HOSP IP/OBS SF/LOW 25: CPT

## 2022-04-07 PROCEDURE — 99239 HOSP IP/OBS DSCHRG MGMT >30: CPT

## 2022-04-07 PROCEDURE — 93306 TTE W/DOPPLER COMPLETE: CPT | Mod: 26

## 2022-04-07 RX ORDER — AMIODARONE HYDROCHLORIDE 400 MG/1
1 TABLET ORAL
Qty: 0 | Refills: 0 | DISCHARGE

## 2022-04-07 RX ORDER — METOPROLOL TARTRATE 50 MG
0.5 TABLET ORAL
Qty: 15 | Refills: 2
Start: 2022-04-07 | End: 2022-07-05

## 2022-04-07 RX ORDER — LEVOTHYROXINE SODIUM 125 MCG
1 TABLET ORAL
Qty: 30 | Refills: 2
Start: 2022-04-07 | End: 2022-07-05

## 2022-04-07 RX ORDER — APIXABAN 2.5 MG/1
1 TABLET, FILM COATED ORAL
Qty: 0 | Refills: 0 | DISCHARGE

## 2022-04-07 RX ORDER — LEVOTHYROXINE SODIUM 125 MCG
1 TABLET ORAL
Qty: 0 | Refills: 0 | DISCHARGE

## 2022-04-07 RX ORDER — ATENOLOL 25 MG/1
1 TABLET ORAL
Qty: 0 | Refills: 0 | DISCHARGE

## 2022-04-07 RX ADMIN — Medication 50 MICROGRAM(S): at 05:57

## 2022-04-07 RX ADMIN — APIXABAN 5 MILLIGRAM(S): 2.5 TABLET, FILM COATED ORAL at 10:26

## 2022-04-07 RX ADMIN — Medication 150 MILLIGRAM(S): at 10:25

## 2022-04-07 NOTE — DISCHARGE NOTE PROVIDER - HOSPITAL COURSE
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 denies hx of CKD), Anemia and Depression presents to [facility] ER this afternoon, 4/5/22, complaining of  worsening fatigue, generalized weakness, mild HA, dry cough with mild myalgia and GABRIEL (with minimal exertion) X 4 days. Pt reports symptoms of generalized weakness, dry cough and worsening GABRIEL occurring four days ago.  According to patient, he took a at home COVID-19 test two days ago which was negative and had a CXR last week which was also negative.   He went to seen his PCP, Dr. Bauer, this morning regarding symptoms who wanted him admitted to [facility] for SOB, Hypoxia and rales for further evaluation.  Pt. denies cardiac hx, CKD, pulmonary disease, fever, chills, chest pain, orthopnea, b/l edema, dizziness, diaphoresis, abdominal discomfort and n/v/d.  Pt. also denies recent travel and sick contact.  In ER ECG reveals Sinus Arie@52bpm with non specific ST-T wave changes, Troponin neg X2, CK 81, CKMB 2.1 BNP 1585, D-dimer 245, Ferritin 124, Lactate 1.2,   , , H/H 9.4/29.4,  WBC 10.4,  Na 133, Neutrophil 85.8, Procalcitonin 0.19, BUN/Creatinine 23/1.50, Total Bili 1.4, AST 67, ALT 70, COVID-19 non detected, RSV neg, CXR reveals b/l ground glass opacities with b/l small pleural effusions and CTA PE protocol b/l ground glass opacities interlobular septal, small b/l pleural effusion with stable 2014 mediastinum lymphadenopathy.  In light of patient's risk factors, elevated inflammatory markers, ELVI, abnormal chest XRay, CTA of chest patient is admitted to [facility] for suspected COVID-19 vs acute CHF exacerbation.     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 denies hx of CKD), Anemia and Depression presents to [facility] ER this afternoon, 4/5/22, complaining of  worsening fatigue, generalized weakness, mild HA, dry cough with mild myalgia and GABRIEL (with minimal exertion) X 4 days. Pt reports symptoms of generalized weakness, dry cough and worsening GABRIEL occurring four days ago.  According to patient, he took a at home COVID-19 test two days ago which was negative and had a CXR last week which was also negative.   He went to seen his PCP, Dr. Bauer, this morning regarding symptoms who wanted him admitted to [facility] for SOB, Hypoxia and rales for further evaluation.  Pt. denies cardiac hx, CKD, pulmonary disease, fever, chills, chest pain, orthopnea, b/l edema, dizziness, diaphoresis, abdominal discomfort and n/v/d.  Pt. also denies recent travel and sick contact.  In ER ECG reveals Sinus Arie@52bpm with non specific ST-T wave changes, Troponin neg X2, CK 81, CKMB 2.1 BNP 1585, D-dimer 245, Ferritin 124, Lactate 1.2,   , , H/H 9.4/29.4,  WBC 10.4,  Na 133, Neutrophil 85.8, Procalcitonin 0.19, BUN/Creatinine 23/1.50, Total Bili 1.4, AST 67, ALT 70, COVID-19 non detected, RSV neg, CXR reveals b/l ground glass opacities with b/l small pleural effusions and CTA PE protocol b/l ground glass opacities interlobular septal, small b/l pleural effusion with stable 2014 mediastinum lymphadenopathy.  In light of patient's risk factors, elevated inflammatory markers, ELVI, abnormal chest XRay, CTA of chest patient is admitted to [facility] for suspected COVID-19 vs acute CHF exacerbation.    #GABRIEL, r/o Acute CHF vs Lower Respiratory infection  -Inflammatory markers elevated but downtrending this admission. Pt remains afebrile and w/o leukocytosis.  -COVID neg x2, RVP neg  -s/p Lasix 40mg IVP x2, net I/O -830cc this admission and no further diuresis continued during admission or on discharge.  -Pt to pursue echocardiogram w/ outpatient Cardiologist  -Pulmonology team was consulted and reports that GABRIEL is likely multifactorial and possibly related to Amiodarone toxicity. Amiodarone was discontinued this admission    #Afib/R/O Amiodarone Toxicity:  -Pt remained in SR this admission. Eliquis and Amiodarone was discontinued on admission and should not be continued on discharge.  -Longstanding hx taking of Amiodarone 200mg PO BID. Serum amiodarone level sent and result pending.  -Pt to f/u as outpatient for PFT.  -Pt's home Atenolol was changed to Metoprolol Succinate 12.5mg PO QD.    CKD IIIA  -Baseline Cr appears to be 1.4-1.5 from 2021. Cr range 1.37-1.69 this admission, 1.37 on day of discharge.  -Renal team was consulted and recommended no further diuresis and encourage PO fluid intake.    #Hypothyroidism:  -TSH 13.66, Free T4 0.925 (mildly low). Home synthroid dose increased to 50mcg QD.    #Anemia:  -Hb 9.4-9.6 this admission, stable. Iron studies significant for Iron sat 7%, Total iron 20, otherwise WNL w/ normal MCV.  -FOB +  -Pt is scheduled for an upcoming GI scope as outpatient on Mon 4/11/22, and reports having undergone an extensive outpatient w/u for anemia w/ Dr. Aguiar.  -Pt received Iron Sucrose 300mg IV x1 on 4/6/22 and is to continue PO iron supplement on discharge.    Echo 4/7/22:___________________    Pt is asymptomatic at this time and denies chest pain, SOB, GABRIEL, palpitations, dizziness, LOC, N/V, diaphoresis, orthopnea/PND, and leg swelling. Pt able to ambulate and void without complication. VSS. Labs and telemetry reviewed. Pt is a candidate for discharge per Dr. Zamudio. Pt given appropriate discharge instructions, pt states they have an appropriate amount of their previous home meds unchanged from this visit at home, and any new medications were sent to their pharmacy. Pt instructed to f/u with GI on Monday 4/11 for scope, w/ Cardiologist Dr. Denny in 1 week, and w/ any doctor in 6-8 weeks for repeat TFT testing and for PFT.. 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 denies hx of CKD), Anemia and Depression presents to [facility] ER this afternoon, 4/5/22, complaining of  worsening fatigue, generalized weakness, mild HA, dry cough with mild myalgia and GABRIEL (with minimal exertion) X 4 days. Pt reports symptoms of generalized weakness, dry cough and worsening GABRIEL occurring four days ago.  According to patient, he took a at home COVID-19 test two days ago which was negative and had a CXR last week which was also negative.   He went to seen his PCP, Dr. Bauer, this morning regarding symptoms who wanted him admitted to [facility] for SOB, Hypoxia and rales for further evaluation.  Pt. denies cardiac hx, CKD, pulmonary disease, fever, chills, chest pain, orthopnea, b/l edema, dizziness, diaphoresis, abdominal discomfort and n/v/d.  Pt. also denies recent travel and sick contact.  In ER ECG reveals Sinus [email or web address]@52bpm with non specific ST-T wave changes, Troponin neg X2, CK 81, CKMB 2.1 BNP 1585, D-dimer 245, Ferritin 124, Lactate 1.2,   , , H/H 9.4/29.4,  WBC 10.4,  Na 133, Neutrophil 85.8, Procalcitonin 0.19, BUN/Creatinine 23/1.50, Total Bili 1.4, AST 67, ALT 70, COVID-19 non detected, RSV neg, CXR reveals b/l ground glass opacities with b/l small pleural effusions and CTA PE protocol b/l ground glass opacities interlobular septal, small b/l pleural effusion with stable 2014 mediastinum lymphadenopathy.  In light of patient's risk factors, elevated inflammatory markers, ELVI, abnormal chest XRay, CTA of chest patient is admitted to [facility] for suspected COVID-19 vs acute CHF exacerbation.    #GABRIEL, r/o Acute CHF vs Lower Respiratory infection  -Inflammatory markers elevated but downtrending this admission. Pt remains afebrile and w/o leukocytosis.  -COVID neg x2, RVP neg  -s/p Lasix 40mg IVP x2, net I/O -830cc this admission and no further diuresis continued during admission or on discharge.  -Pt to pursue echocardiogram w/ outpatient Cardiologist  -Pulmonology team was consulted and reports that GABRIEL is likely multifactorial and possibly related to Amiodarone toxicity. Amiodarone was discontinued this admission  Echo 4/7/22: Normal biV size and systolic function, mild sLVH, G2DD, moderately dilated LA, mild AR/MR/TR, aortic sclerosis w/o significant stenosis, pHTN (PASP 50mmHg), no pericardial effusion.    #Afib/R/O Amiodarone Toxicity:  -Pt remained in SR this admission. Eliquis and Amiodarone was discontinued on admission and should not be continued on discharge.  -Longstanding hx taking of Amiodarone 200mg PO BID. Serum amiodarone level sent and result pending.  -Pt to f/u as outpatient for PFT.  -Pt's home Atenolol was changed to Metoprolol Succinate 12.5mg PO QD.    CKD IIIA  -Baseline Cr appears to be 1.4-1.5 from 2021. Cr range 1.37-1.69 this admission, 1.37 on day of discharge.  -Renal team was consulted and recommended no further diuresis and encourage PO fluid intake.    #Hypothyroidism:  -TSH 13.66, Free T4 0.925 (mildly low). Home synthroid dose increased to 50mcg QD.    #Anemia:  -Hb 9.4-9.6 this admission, stable. Iron studies significant for Iron sat 7%, Total iron 20, otherwise WNL w/ normal MCV.  -FOB +  -Pt is scheduled for an upcoming GI scope as outpatient on Mon 4/11/22, and reports having undergone an extensive outpatient w/u for anemia w/ Dr. Aguiar.  -Pt received Iron Sucrose 300mg IV x1 on 4/6/22 and is to continue PO iron supplement on discharge.        Pt is asymptomatic at this time and denies chest pain, SOB, GABRIEL, palpitations, dizziness, LOC, N/V, diaphoresis, orthopnea/PND, and leg swelling. Pt able to ambulate and void without complication. VSS. Labs and telemetry reviewed. Pt is a candidate for discharge per Dr. Zamudio. Pt given appropriate discharge instructions, pt states they have an appropriate amount of their previous home meds unchanged from this visit at home, and any new medications were sent to their pharmacy. Pt instructed to f/u with GI on Monday 4/11 for scope, w/ Cardiologist Dr. Denny in 1 week, and w/ any doctor in 6-8 weeks for repeat TFT testing and for PFT.

## 2022-04-07 NOTE — DISCHARGE NOTE PROVIDER - NSDCCPCAREPLAN_GEN_ALL_CORE_FT
PRINCIPAL DISCHARGE DIAGNOSIS  Diagnosis: GABRIEL (dyspnea on exertion)  Assessment and Plan of Treatment: You presented complaining of dyspnea on exertion. You were found to have inflammatory markers that can indicate an infection, but you had 2 covid tests and a full respiratory panel performed which were all negative. Your EKG was not consistent with active heart muscle damage and your cardiac enzymes were negative 3x consecutively. You received two doses of the diuretic medication Lasix through your IV. Your home medication Amiodarone was stopped because your symptoms may be related to Amiodarone toxicity, and an Amiodarone blood level was sent and is pending a result, please follow up with your Cardiologist in 1-2 weeks to discuss this.      SECONDARY DISCHARGE DIAGNOSES  Diagnosis: Afib  Assessment and Plan of Treatment: You were found to be in sinus rhythm during this admission.  Please STOP taking your home Eliquis, Amiodarone, and Atenolol. You were started on Metoprolol Succinate ER 12.5mg oral daily which was sent to your pharmacy, please pick this up and begin taking as prescribed.    Diagnosis: Hypothyroidism  Assessment and Plan of Treatment: You were found to have an elevated TSH level and mildly low free T4 level, which indicated hypothyroidism. You home dose of Levothyroxine (Synthroid) was increased to 50mcg oral daily which was sent to your pharmacy. Please pick this up and begin taking as prescribed, and follow up with any doctor in 6-8 weeks to have your thyroid levels rechecked.    Diagnosis: Anemia  Assessment and Plan of Treatment: You were found to have a Hemoglobin level of 9.4 this admission which was stable but this is considered low, and may be contributing to your symptoms. Your Iron study showed low iron saturation and total iron, but all other testing was within normal limits. You received one dose of IV iron, and should continue oral iron supplimentation after discharge. Your fecal occult blood test was also positive. Please follow up with your GI for your scheduled scope on 4/6/22.

## 2022-04-07 NOTE — DISCHARGE NOTE PROVIDER - NSDCFUADDAPPT_GEN_ALL_CORE_FT
Please arrange to follow up with your Cardiologist within 1-2 weeks.    Please go for your outpatient scope on Monday 4/11/22.    Please arrange to have your thyroid function blood test repeated in 6-8 weeks.    Please arrange to have pulmonary function testing performed as outpatient.

## 2022-04-07 NOTE — PROGRESS NOTE ADULT - ASSESSMENT
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    Hypoperfusion mediated ELVI- now resolved  Euvolaemic- no need for diuretics  Creat at baseline ~1.4  PFTs outpt with pulm to assess for amio lung toxicity

## 2022-04-07 NOTE — DISCHARGE NOTE NURSING/CASE MANAGEMENT/SOCIAL WORK - PATIENT PORTAL LINK FT
You can access the FollowMyHealth Patient Portal offered by [facility] by registering at the following website: [email or web address]. By joining [facility]’s FollowMyHealth portal, you will also be able to view your health information using other applications (apps) compatible with our system.

## 2022-04-07 NOTE — DISCHARGE NOTE NURSING/CASE MANAGEMENT/SOCIAL WORK - NSDCPEFALRISK_GEN_ALL_CORE
For information on Fall & Injury Prevention, visit: [email or web address] OR  [email or web address] OR  https://www.cdc.gov/steadi/patient.html

## 2022-04-07 NOTE — PROGRESS NOTE ADULT - SUBJECTIVE AND OBJECTIVE BOX
No complaints  No SOB  VS stable Afeb In RR 40s No rales No edema
Feels better this am  VS stable Afeb In RR 40s  No rales this am Abd is soft No edema
Patient is a 76y Male seen and evaluated at bedside. Creat back to baseline. ELVI resolved. Off diuretics. For dc today.     Meds:    acetaminophen     Tablet .. 650 every 6 hours PRN  apixaban 5 every 12 hours  ipratropium    for Nebulization 500 every 6 hours PRN  iron sucrose IVPB 300 every 24 hours  levothyroxine 50 daily  venlafaxine XR. 150 daily      T(C): , Max: 36.4 (22 @ 14:22)  T(F): , Max: 97.5 (22 @ 14:22)  HR: 51 (22 @ 09:00)  BP: 150/66 (22 @ 09:00)  BP(mean): --  RR: 16 (22 @ 09:00)  SpO2: 97% (22 @ 09:00)  Wt(kg): --     @ 07:01  -   @ 07:00  --------------------------------------------------------  IN: 800 mL / OUT: 1100 mL / NET: -300 mL     @ 07:01  -   @ 13:31  --------------------------------------------------------  IN: 180 mL / OUT: 0 mL / NET: 180 mL    Review of Systems:  RESPIRATORY: No shortness of breath  CARDIOVASCULAR: No chest pain   MUSCULOSKELETAL: No leg oedema    PHYSICAL EXAM:  GENERAL: alert, no acute distress at present on RA  CHEST/LUNG: Clear to auscultation bilaterally  HEART: normal S1S2, RRR  ABDOMEN: Soft, Nontender, No flank tenderness bilateral  EXTREMITIES: No oedema     LABS:                        9.6    7.18  )-----------( 228      ( 2022 07:53 )             30.9         139  |  107  |  27<H>  ----------------------------<  85  3.9   |  22  |  1.37<H>    Ca    8.2<L>      2022 07:53  Mg     2.2         TPro  6.7  /  Alb  2.9<L>  /  TBili  0.8  /  DBili  x   /  AST  89<H>  /  ALT  83<H>  /  AlkPhos  104        PT/INR - ( 2022 20:28 )   PT: 26.0 sec;   INR: 2.17          PTT - ( 2022 20:28 )  PTT:34.2 sec  Urinalysis Basic - ( 2022 08:24 )    Color: Yellow / Appearance: Clear / S.020 / pH: x  Gluc: x / Ketone: NEGATIVE  / Bili: Negative / Urobili: 2.0 E.U./dL   Blood: x / Protein: NEGATIVE mg/dL / Nitrite: NEGATIVE   Leuk Esterase: NEGATIVE / RBC: x / WBC x   Sq Epi: x / Non Sq Epi: x / Bacteria: x      Sodium, Random Urine: 41 mmol/L ( @ 08:24)  Creatinine, Random Urine: 126 mg/dL ( @ 08:24)  Protein/Creatinine Ratio Calculation: 0.1 Ratio ( @ 08:24)        RADIOLOGY & ADDITIONAL STUDIES:          
Interventional Cardiology PA Adult Progress Note    Subjective Assessment:    Pt seen and assessed at bedside. Pt appears comfortable, is in no acute distress, and has no complaints at this time.  12 point ROS negative other than what specified.  	  MEDICATIONS:  ipratropium    for Nebulization 500 MICROGram(s) Nebulizer every 6 hours PRN  acetaminophen     Tablet .. 650 milliGRAM(s) Oral every 6 hours PRN  venlafaxine XR. 150 milliGRAM(s) Oral daily  levothyroxine 25 MICROGram(s) Oral daily  apixaban 5 milliGRAM(s) Oral every 12 hours      [PHYSICAL EXAM:  TELEMETRY:  T(C): 36.4 (04-06-22 @ 09:58), Max: 36.8 (04-05-22 @ 16:57)  HR: 45 (04-06-22 @ 08:55) (45 - 50)  BP: 135/62 (04-06-22 @ 08:55) (111/58 - 157/70)  RR: 18 (04-06-22 @ 08:55) (18 - 21)  SpO2: 95% (04-06-22 @ 08:55) (90% - 97%)  Wt(kg): --  I&O's Summary    05 Apr 2022 07:01  -  06 Apr 2022 07:00  --------------------------------------------------------  IN: 300 mL / OUT: 1010 mL / NET: -710 mL    06 Apr 2022 07:01  -  06 Apr 2022 13:59  --------------------------------------------------------  IN: 0 mL / OUT: 600 mL / NET: -600 mL    Height (cm): 185.4 (04-05 @ 21:57)  Poole:  Central/PICC/Mid Line:                                         Appearance: Normal	  HEENT:   Normal oral mucosa, PERRL, EOMI	  Neck: Supple, - JVD; no carotid Bruit   Cardiovascular: Normal S1 S2, No JVD, No murmurs,   Respiratory: Lungs clear to auscultation, No Rales, Rhonchi, or Wheezing	  Gastrointestinal:  Soft, Non-tender, + BS	  Skin: No rashes, No ecchymoses, No cyanosis  Extremities: Normal range of motion, No clubbing, cyanosis or edema  Vascular: Peripheral pulses palpable 2+ bilaterally  Neurologic: Non-focal  Psychiatry: A & O x 3, Mood & affect appropriate  	    ECG:  	  RADIOLOGY:   DIAGNOSTIC TESTING:  [ ] Echocardiogram:  [ ]  Catheterization:  [ ] Stress Test:    [ ] RICO  OTHER: 	    LABS:	 	  CARDIAC MARKERS:  Troponin T, Serum: 0.01 ng/mL (04-06-22 @ 07:56)  Troponin T, Serum: 0.01 ng/mL (04-05-22 @ 20:29)  Troponin T, Serum: <0.01 ng/mL (04-05-22 @ 14:24)                        9.4    8.40  )-----------( 220      ( 06 Apr 2022 07:56 )             29.7     04-06    136  |  104  |  28<H>  ----------------------------<  103<H>  3.6   |  21<L>  |  1.69<H>    Ca    8.3<L>      06 Apr 2022 07:56  Mg     2.1     04-06    TPro  7.0  /  Alb  3.2<L>  /  TBili  0.9  /  DBili  0.4<H>  /  AST  82<H>  /  ALT  75<H>  /  AlkPhos  94  04-06    proBNP: Serum Pro-Brain Natriuretic Peptide: 1585 pg/mL (04-05 @ 14:24)    Lipid Profile:   HgA1c:   TSH: Thyroid Stimulating Hormone, Serum: 13.660 uIU/mL (04-06 @ 07:56)    PT/INR - ( 05 Apr 2022 20:28 )   PT: 26.0 sec;   INR: 2.17     PTT - ( 05 Apr 2022 20:28 )  PTT:34.2 sec

## 2022-04-07 NOTE — DISCHARGE NOTE PROVIDER - NSDCMRMEDTOKEN_GEN_ALL_CORE_FT
allopurinol 300 mg oral tablet: 1 tab(s) orally once a day  amiodarone 200 mg oral tablet: 1 tab(s) orally 2 times a day  atenolol 25 mg oral tablet: 1 tab(s) orally 2 times a day  Eliquis 5 mg oral tablet: 1 tab(s) orally 2 times a day  levothyroxine 25 mcg (0.025 mg) oral tablet: 1 tab(s) orally once a day  rosuvastatin 5 mg oral tablet: 1 tab(s) orally once a day  venlafaxine 150 mg oral tablet, extended release: 1 tab(s) orally 2 times a day   allopurinol 300 mg oral tablet: 1 tab(s) orally once a day  Metoprolol Succinate ER 25 mg oral tablet, extended release: 0.5 tab(s) orally once a day x 30 days   rosuvastatin 5 mg oral tablet: 1 tab(s) orally once a day  Synthroid 50 mcg (0.05 mg) oral tablet: 1 tab(s) orally once a day  venlafaxine 150 mg oral tablet, extended release: 1 tab(s) orally 2 times a day

## 2022-04-07 NOTE — CONSULT NOTE ADULT - REASON FOR ADMISSION
Generalized weakness, mild generalized muscle aches, mild HA, worsening SOB and GABRIEL

## 2022-04-07 NOTE — CONSULT NOTE ADULT - SUBJECTIVE AND OBJECTIVE BOX
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     **incomplete**    PAST MEDICAL & SURGICAL HISTORY:  see above    Social History: no smoking, no drugs, no algohol    pertinent home medications:    Inpatient Medications:   acetaminophen     Tablet .. 650 milliGRAM(s) Oral every 6 hours PRN  apixaban 5 milliGRAM(s) Oral every 12 hours  ipratropium    for Nebulization 500 MICROGram(s) Nebulizer every 6 hours PRN  iron sucrose IVPB 300 milliGRAM(s) IV Intermittent every 24 hours  levothyroxine 50 MICROGram(s) Oral daily  venlafaxine XR. 150 milliGRAM(s) Oral daily      Allergies: penicillin (Angioedema)      ROS:   CONSTITUTIONAL: No fever, weight loss + fatigue  EYES: Pt denies  RESPIRATORY: No cough, wheezing, chills or hemoptysis; No Shortness of Breath  CARDIOVASCULAR: see HPI  GASTROINTESTINAL: Pt denies  NEUROLOGICAL: Pt denies  SKIN: Pt denies   PSYCHIATRIC: Pt denies  HEME/LYMPH: Pt denies    PHYSICAL:  T(C): 36.1 (04-07-22 @ 04:47), Max: 36.4 (04-06-22 @ 14:22)  HR: 51 (04-07-22 @ 09:00) (45 - 51)  BP: 150/66 (04-07-22 @ 09:00) (111/56 - 187/77)  RR: 16 (04-07-22 @ 09:00) (16 - 18)  SpO2: 97% (04-07-22 @ 09:00) (93% - 97%)  Wt(kg): --  Appearance: No acute distress, well developed  Eyes: normal appearing conjunctiva, pupils and eyelids  Cardiovascular: Normal S1 S2, No JVD, No murmurs, No edema  Respiratory: Lungs clear to auscultation	bilaterally.  No wheeze, rhonchi, rales noted  Gastrointestinal:  Soft, NT/ND 	  Neurologic:  No deficit noted  Psych: A&Ox3, normal mood/affect  Musculoskeletal: normal gait  Skin: no rash noted, normal color and pigmentation.        LABS:                        9.6    7.18  )-----------( 228      ( 07 Apr 2022 07:53 )             30.9     04-07    139  |  107  |  27<H>  ----------------------------<  85  3.9   |  22  |  1.37<H>    Ca    8.2<L>      07 Apr 2022 07:53  Mg     2.2     04-07    TPro  6.7  /  Alb  2.9<L>  /  TBili  0.8  /  DBili  x   /  AST  89<H>  /  ALT  83<H>  /  AlkPhos  104  04-07    PT/INR - ( 05 Apr 2022 20:28 )   PT: 26.0 sec;   INR: 2.17          PTT - ( 05 Apr 2022 20:28 )  PTT:34.2 sec  TSH  Troponin    EKG:    Telemetry:    ECHO:    Prior EP procedures:    Cath / stress / Cardiac CTa:    Assessment Plan:         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     Per patient he has had AF for about 10 years, has had 4 cardioversions, mostly recently 3 years ago and has been on amiodarone since then.     PAST MEDICAL & SURGICAL HISTORY:  see above    Social History: no smoking, no drugs, no alcohol    pertinent home medications:    Inpatient Medications:   acetaminophen     Tablet .. 650 milliGRAM(s) Oral every 6 hours PRN  apixaban 5 milliGRAM(s) Oral every 12 hours  ipratropium    for Nebulization 500 MICROGram(s) Nebulizer every 6 hours PRN  iron sucrose IVPB 300 milliGRAM(s) IV Intermittent every 24 hours  levothyroxine 50 MICROGram(s) Oral daily  venlafaxine XR. 150 milliGRAM(s) Oral daily      Allergies: penicillin (Angioedema)      ROS:   CONSTITUTIONAL: No fever, weight loss + fatigue  EYES: Pt denies  RESPIRATORY: No cough, wheezing, chills or hemoptysis; No Shortness of Breath  CARDIOVASCULAR: see HPI  GASTROINTESTINAL: Pt denies  NEUROLOGICAL: Pt denies  SKIN: Pt denies   PSYCHIATRIC: Pt denies  HEME/LYMPH: Pt denies    PHYSICAL:  T(C): 36.1 (04-07-22 @ 04:47), Max: 36.4 (04-06-22 @ 14:22)  HR: 51 (04-07-22 @ 09:00) (45 - 51)  BP: 150/66 (04-07-22 @ 09:00) (111/56 - 187/77)  RR: 16 (04-07-22 @ 09:00) (16 - 18)  SpO2: 97% (04-07-22 @ 09:00) (93% - 97%)  Wt(kg): --  Appearance: No acute distress, well developed  Eyes: normal appearing conjunctiva, pupils and eyelids  Cardiovascular: Normal S1 S2, No JVD, No murmurs, No edema  Respiratory: Lungs clear to auscultation	bilaterally.  No wheeze, rhonchi, rales noted  Gastrointestinal:  Soft, NT/ND 	  Neurologic:  No deficit noted  Psych: A&Ox3, normal mood/affect  Musculoskeletal: normal gait  Skin: no rash noted, normal color and pigmentation.        LABS:                        9.6    7.18  )-----------( 228      ( 07 Apr 2022 07:53 )             30.9     04-07    139  |  107  |  27<H>  ----------------------------<  85  3.9   |  22  |  1.37<H>    Ca    8.2<L>      07 Apr 2022 07:53  Mg     2.2     04-07    TPro  6.7  /  Alb  2.9<L>  /  TBili  0.8  /  DBili  x   /  AST  89<H>  /  ALT  83<H>  /  AlkPhos  104  04-07    PT/INR - ( 05 Apr 2022 20:28 )   PT: 26.0 sec;   INR: 2.17          PTT - ( 05 Apr 2022 20:28 )  PTT:34.2 sec    EKG: sinus bradycardia with narrow QRSd    Telemetry: sinus rhythm 50s     ECHO: pending.      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     Per patient he has had AF for about 10 years, has had 4 cardioversions, mostly recently 3 years ago and has been on amiodarone since then.     Per pulmonology note:   he differential for these findings include decompensated heart failure as well as possible amiodarone induced lung injury.    If there is concern that amiodarone is causing pulmonary function/structure abnormalities than the risk outweights benefit of amiodarone therapy.  Agree to hold amiodarone, patient can follow up with his EP at [facility] or can call Dr. Hou for outpatient follow up. if he recurs with AF in the future he may benefit from AF ablation.  cont BB and AC with eliquis    No further recommendations at this time.

## 2022-04-07 NOTE — PROGRESS NOTE ADULT - REASON FOR ADMISSION
Generalized weakness, mild generalized muscle aches, mild HA, worsening SOB and GABRIEL
r/o viral lower respiratory infection vs acute CHF

## 2022-04-07 NOTE — DISCHARGE NOTE PROVIDER - CARE PROVIDER_API CALL
DANYEL CARDENAS  Cardiovascular Diseases  [address] [place] [address]  Phone: [phone]  Fax: [phone]  Established Patient  Follow Up Time: 2 weeks

## 2022-04-08 ENCOUNTER — OUTPATIENT (OUTPATIENT)
Dept: OUTPATIENT SERVICES | Facility: HOSPITAL | Age: 77
LOS: 1 days | End: 2022-04-08
Payer: MEDICARE

## 2022-04-08 DIAGNOSIS — R06.00 DYSPNEA, UNSPECIFIED: ICD-10-CM

## 2022-04-08 DIAGNOSIS — Z98.890 OTHER SPECIFIED POSTPROCEDURAL STATES: Chronic | ICD-10-CM

## 2022-04-08 DIAGNOSIS — Z98.49 CATARACT EXTRACTION STATUS, UNSPECIFIED EYE: Chronic | ICD-10-CM

## 2022-04-08 PROBLEM — Z86.59 PERSONAL HISTORY OF OTHER MENTAL AND BEHAVIORAL DISORDERS: Chronic | Status: ACTIVE | Noted: 2022-04-05

## 2022-04-08 PROBLEM — E03.9 HYPOTHYROIDISM, UNSPECIFIED: Chronic | Status: ACTIVE | Noted: 2022-04-05

## 2022-04-08 PROBLEM — E78.5 HYPERLIPIDEMIA, UNSPECIFIED: Chronic | Status: ACTIVE | Noted: 2022-04-05

## 2022-04-08 PROCEDURE — 94726 PLETHYSMOGRAPHY LUNG VOLUMES: CPT | Mod: 26

## 2022-04-08 PROCEDURE — 94760 N-INVAS EAR/PLS OXIMETRY 1: CPT

## 2022-04-08 PROCEDURE — 94729 DIFFUSING CAPACITY: CPT | Mod: 26

## 2022-04-08 PROCEDURE — 94060 EVALUATION OF WHEEZING: CPT

## 2022-04-08 PROCEDURE — 94010 BREATHING CAPACITY TEST: CPT | Mod: 26

## 2022-04-08 PROCEDURE — 94729 DIFFUSING CAPACITY: CPT

## 2022-04-08 PROCEDURE — 94726 PLETHYSMOGRAPHY LUNG VOLUMES: CPT

## 2022-04-12 DIAGNOSIS — N18.31 CHRONIC KIDNEY DISEASE, STAGE 3A: ICD-10-CM

## 2022-04-12 DIAGNOSIS — F32.A DEPRESSION, UNSPECIFIED: ICD-10-CM

## 2022-04-12 DIAGNOSIS — I13.0 HYPERTENSIVE HEART AND CHRONIC KIDNEY DISEASE WITH HEART FAILURE AND STAGE 1 THROUGH STAGE 4 CHRONIC KIDNEY DISEASE, OR UNSPECIFIED CHRONIC KIDNEY DISEASE: ICD-10-CM

## 2022-04-12 DIAGNOSIS — D63.1 ANEMIA IN CHRONIC KIDNEY DISEASE: ICD-10-CM

## 2022-04-12 DIAGNOSIS — E78.5 HYPERLIPIDEMIA, UNSPECIFIED: ICD-10-CM

## 2022-04-12 DIAGNOSIS — I48.91 UNSPECIFIED ATRIAL FIBRILLATION: ICD-10-CM

## 2022-04-12 DIAGNOSIS — Z90.49 ACQUIRED ABSENCE OF OTHER SPECIFIED PARTS OF DIGESTIVE TRACT: ICD-10-CM

## 2022-04-12 DIAGNOSIS — Z88.0 ALLERGY STATUS TO PENICILLIN: ICD-10-CM

## 2022-04-12 DIAGNOSIS — E03.9 HYPOTHYROIDISM, UNSPECIFIED: ICD-10-CM

## 2022-04-12 DIAGNOSIS — I48.92 UNSPECIFIED ATRIAL FLUTTER: ICD-10-CM

## 2022-04-12 DIAGNOSIS — I27.20 PULMONARY HYPERTENSION, UNSPECIFIED: ICD-10-CM

## 2022-04-12 DIAGNOSIS — R00.1 BRADYCARDIA, UNSPECIFIED: ICD-10-CM

## 2022-04-12 DIAGNOSIS — I08.3 COMBINED RHEUMATIC DISORDERS OF MITRAL, AORTIC AND TRICUSPID VALVES: ICD-10-CM

## 2022-04-12 DIAGNOSIS — N17.9 ACUTE KIDNEY FAILURE, UNSPECIFIED: ICD-10-CM

## 2022-04-12 DIAGNOSIS — I50.9 HEART FAILURE, UNSPECIFIED: ICD-10-CM

## 2022-04-12 DIAGNOSIS — M10.9 GOUT, UNSPECIFIED: ICD-10-CM

## 2022-04-12 DIAGNOSIS — Z79.01 LONG TERM (CURRENT) USE OF ANTICOAGULANTS: ICD-10-CM

## 2022-04-12 DIAGNOSIS — R06.02 SHORTNESS OF BREATH: ICD-10-CM

## 2022-04-12 DIAGNOSIS — T46.2X5A ADVERSE EFFECT OF OTHER ANTIDYSRHYTHMIC DRUGS, INITIAL ENCOUNTER: ICD-10-CM

## 2022-04-12 DIAGNOSIS — Z87.891 PERSONAL HISTORY OF NICOTINE DEPENDENCE: ICD-10-CM

## 2022-04-12 DIAGNOSIS — R59.0 LOCALIZED ENLARGED LYMPH NODES: ICD-10-CM

## 2022-04-16 LAB
AMIODARONE FLD-MCNC: 1294 NG/ML — SIGNIFICANT CHANGE UP (ref 1000–2500)
AMIODARONE+DESETH SERPL-MCNC: 899 NG/ML — SIGNIFICANT CHANGE UP

## 2022-04-26 PROCEDURE — 85379 FIBRIN DEGRADATION QUANT: CPT

## 2022-04-26 PROCEDURE — 82248 BILIRUBIN DIRECT: CPT

## 2022-04-26 PROCEDURE — 83540 ASSAY OF IRON: CPT

## 2022-04-26 PROCEDURE — 84439 ASSAY OF FREE THYROXINE: CPT

## 2022-04-26 PROCEDURE — 84145 PROCALCITONIN (PCT): CPT

## 2022-04-26 PROCEDURE — 84443 ASSAY THYROID STIM HORMONE: CPT

## 2022-04-26 PROCEDURE — 82728 ASSAY OF FERRITIN: CPT

## 2022-04-26 PROCEDURE — 83550 IRON BINDING TEST: CPT

## 2022-04-26 PROCEDURE — 82570 ASSAY OF URINE CREATININE: CPT

## 2022-04-26 PROCEDURE — 85730 THROMBOPLASTIN TIME PARTIAL: CPT

## 2022-04-26 PROCEDURE — 84295 ASSAY OF SERUM SODIUM: CPT

## 2022-04-26 PROCEDURE — 84540 ASSAY OF URINE/UREA-N: CPT

## 2022-04-26 PROCEDURE — 93306 TTE W/DOPPLER COMPLETE: CPT

## 2022-04-26 PROCEDURE — 85025 COMPLETE CBC W/AUTO DIFF WBC: CPT

## 2022-04-26 PROCEDURE — 84560 ASSAY OF URINE/URIC ACID: CPT

## 2022-04-26 PROCEDURE — 83735 ASSAY OF MAGNESIUM: CPT

## 2022-04-26 PROCEDURE — 84481 FREE ASSAY (FT-3): CPT

## 2022-04-26 PROCEDURE — 84132 ASSAY OF SERUM POTASSIUM: CPT

## 2022-04-26 PROCEDURE — 85610 PROTHROMBIN TIME: CPT

## 2022-04-26 PROCEDURE — 84466 ASSAY OF TRANSFERRIN: CPT

## 2022-04-26 PROCEDURE — 0225U NFCT DS DNA&RNA 21 SARSCOV2: CPT

## 2022-04-26 PROCEDURE — 82553 CREATINE MB FRACTION: CPT

## 2022-04-26 PROCEDURE — 83880 ASSAY OF NATRIURETIC PEPTIDE: CPT

## 2022-04-26 PROCEDURE — 81003 URINALYSIS AUTO W/O SCOPE: CPT

## 2022-04-26 PROCEDURE — 86803 HEPATITIS C AB TEST: CPT

## 2022-04-26 PROCEDURE — 97161 PT EVAL LOW COMPLEX 20 MIN: CPT

## 2022-04-26 PROCEDURE — 80053 COMPREHEN METABOLIC PANEL: CPT

## 2022-04-26 PROCEDURE — 71045 X-RAY EXAM CHEST 1 VIEW: CPT

## 2022-04-26 PROCEDURE — 71275 CT ANGIOGRAPHY CHEST: CPT | Mod: MA

## 2022-04-26 PROCEDURE — 80151 DRUG ASSAY AMIODARONE: CPT

## 2022-04-26 PROCEDURE — 82550 ASSAY OF CK (CPK): CPT

## 2022-04-26 PROCEDURE — 84484 ASSAY OF TROPONIN QUANT: CPT

## 2022-04-26 PROCEDURE — 80061 LIPID PANEL: CPT

## 2022-04-26 PROCEDURE — 87637 SARSCOV2&INF A&B&RSV AMP PRB: CPT

## 2022-04-26 PROCEDURE — 96374 THER/PROPH/DIAG INJ IV PUSH: CPT

## 2022-04-26 PROCEDURE — U0005: CPT

## 2022-04-26 PROCEDURE — U0003: CPT

## 2022-04-26 PROCEDURE — 84300 ASSAY OF URINE SODIUM: CPT

## 2022-04-26 PROCEDURE — 82330 ASSAY OF CALCIUM: CPT

## 2022-04-26 PROCEDURE — 99285 EMERGENCY DEPT VISIT HI MDM: CPT | Mod: 25

## 2022-04-26 PROCEDURE — 82803 BLOOD GASES ANY COMBINATION: CPT

## 2022-04-26 PROCEDURE — 82272 OCCULT BLD FECES 1-3 TESTS: CPT

## 2022-04-26 PROCEDURE — 86140 C-REACTIVE PROTEIN: CPT

## 2022-04-26 PROCEDURE — 83605 ASSAY OF LACTIC ACID: CPT

## 2022-04-26 PROCEDURE — 84156 ASSAY OF PROTEIN URINE: CPT

## 2022-04-26 PROCEDURE — 85652 RBC SED RATE AUTOMATED: CPT

## 2022-04-26 PROCEDURE — 36415 COLL VENOUS BLD VENIPUNCTURE: CPT

## 2022-05-02 ENCOUNTER — APPOINTMENT (OUTPATIENT)
Dept: OTOLARYNGOLOGY | Facility: CLINIC | Age: 77
End: 2022-05-02
Payer: SELF-PAY

## 2022-05-02 PROCEDURE — 92593: CPT | Mod: NC

## 2022-09-13 ENCOUNTER — APPOINTMENT (OUTPATIENT)
Dept: OTOLARYNGOLOGY | Facility: CLINIC | Age: 77
End: 2022-09-13

## 2022-09-13 PROCEDURE — 92593: CPT | Mod: NC

## 2022-12-12 NOTE — H&P ADULT - NSHPPOAURINARYCATHETER_GEN_ALL_CORE
-- DO NOT REPLY / DO NOT REPLY ALL --  -- Message is from Engagement Center Operations (ECO) --    General Patient Message     Patient is calling to reschedule her appointment with Dr. Concepcion on 01/05. Please call back to reschedule.        Caller Information       Type Contact Phone/Fax    12/12/2022 02:25 PM CST Phone (Incoming) Sharmaine Ballard AMBROSE (Self) [phone] (M)        Alternative phone number: [phone]    Can a detailed message be left? Yes    Message Turnaround: [place]:    Refer to site's KB page for routing instructions    Please give this turnaround time to the caller:   \"You can expect to receive a response 2-3 business days after your provider's clinical team reviews the message\"              
no

## 2023-01-01 ENCOUNTER — APPOINTMENT (OUTPATIENT)
Dept: OTOLARYNGOLOGY | Facility: CLINIC | Age: 78
End: 2023-01-01

## 2023-01-01 ENCOUNTER — TRANSCRIPTION ENCOUNTER (OUTPATIENT)
Age: 78
End: 2023-01-01

## 2023-01-01 ENCOUNTER — INPATIENT (INPATIENT)
Facility: HOSPITAL | Age: 78
LOS: 233 days | Discharge: HOME HEALTH | DRG: 329 | End: 2024-02-12
Attending: COLON & RECTAL SURGERY | Admitting: COLON & RECTAL SURGERY
Payer: MEDICARE

## 2023-01-01 VITALS
SYSTOLIC BLOOD PRESSURE: 194 MMHG | HEART RATE: 98 BPM | TEMPERATURE: 98 F | HEIGHT: 74 IN | RESPIRATION RATE: 20 BRPM | DIASTOLIC BLOOD PRESSURE: 72 MMHG | WEIGHT: 250 LBS | OXYGEN SATURATION: 98 %

## 2023-01-01 DIAGNOSIS — K65.1 PERITONEAL ABSCESS: ICD-10-CM

## 2023-01-01 DIAGNOSIS — K57.10 DIVERTICULOSIS OF SMALL INTESTINE WITHOUT PERFORATION OR ABSCESS WITHOUT BLEEDING: ICD-10-CM

## 2023-01-01 DIAGNOSIS — I35.1 NONRHEUMATIC AORTIC (VALVE) INSUFFICIENCY: ICD-10-CM

## 2023-01-01 DIAGNOSIS — K82.8 OTHER SPECIFIED DISEASES OF GALLBLADDER: ICD-10-CM

## 2023-01-01 DIAGNOSIS — K56.50 INTESTINAL ADHESIONS [BANDS], UNSPECIFIED AS TO PARTIAL VERSUS COMPLETE OBSTRUCTION: ICD-10-CM

## 2023-01-01 DIAGNOSIS — I44.2 ATRIOVENTRICULAR BLOCK, COMPLETE: ICD-10-CM

## 2023-01-01 DIAGNOSIS — E86.0 DEHYDRATION: ICD-10-CM

## 2023-01-01 DIAGNOSIS — D47.2 MONOCLONAL GAMMOPATHY: ICD-10-CM

## 2023-01-01 DIAGNOSIS — E87.70 FLUID OVERLOAD, UNSPECIFIED: ICD-10-CM

## 2023-01-01 DIAGNOSIS — E80.6 OTHER DISORDERS OF BILIRUBIN METABOLISM: ICD-10-CM

## 2023-01-01 DIAGNOSIS — Z53.39 OTHER SPECIFIED PROCEDURE CONVERTED TO OPEN PROCEDURE: ICD-10-CM

## 2023-01-01 DIAGNOSIS — I47.20 VENTRICULAR TACHYCARDIA, UNSPECIFIED: ICD-10-CM

## 2023-01-01 DIAGNOSIS — E61.0 COPPER DEFICIENCY: ICD-10-CM

## 2023-01-01 DIAGNOSIS — I10 ESSENTIAL (PRIMARY) HYPERTENSION: ICD-10-CM

## 2023-01-01 DIAGNOSIS — A41.9 SEPSIS, UNSPECIFIED ORGANISM: ICD-10-CM

## 2023-01-01 DIAGNOSIS — E16.2 HYPOGLYCEMIA, UNSPECIFIED: ICD-10-CM

## 2023-01-01 DIAGNOSIS — D73.5 INFARCTION OF SPLEEN: ICD-10-CM

## 2023-01-01 DIAGNOSIS — I82.611 ACUTE EMBOLISM AND THROMBOSIS OF SUPERFICIAL VEINS OF RIGHT UPPER EXTREMITY: ICD-10-CM

## 2023-01-01 DIAGNOSIS — R57.1 HYPOVOLEMIC SHOCK: ICD-10-CM

## 2023-01-01 DIAGNOSIS — G47.00 INSOMNIA, UNSPECIFIED: ICD-10-CM

## 2023-01-01 DIAGNOSIS — F41.8 OTHER SPECIFIED ANXIETY DISORDERS: ICD-10-CM
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
## 2023-01-01 DIAGNOSIS — K43.2 INCISIONAL HERNIA WITHOUT OBSTRUCTION OR GANGRENE: ICD-10-CM

## 2023-01-01 DIAGNOSIS — N13.30 UNSPECIFIED HYDRONEPHROSIS: ICD-10-CM

## 2023-01-01 DIAGNOSIS — Z98.49 CATARACT EXTRACTION STATUS, UNSPECIFIED EYE: Chronic | ICD-10-CM

## 2023-01-01 DIAGNOSIS — T85.598A OTHER MECHANICAL COMPLICATION OF OTHER GASTROINTESTINAL PROSTHETIC DEVICES, IMPLANTS AND GRAFTS, INITIAL ENCOUNTER: ICD-10-CM

## 2023-01-01 DIAGNOSIS — D64.9 ANEMIA, UNSPECIFIED: ICD-10-CM

## 2023-01-01 DIAGNOSIS — A41.89 OTHER SPECIFIED SEPSIS: ICD-10-CM

## 2023-01-01 DIAGNOSIS — L76.22 POSTPROCEDURAL HEMORRHAGE OF SKIN AND SUBCUTANEOUS TISSUE FOLLOWING OTHER PROCEDURE: ICD-10-CM

## 2023-01-01 DIAGNOSIS — I82.621 ACUTE EMBOLISM AND THROMBOSIS OF DEEP VEINS OF RIGHT UPPER EXTREMITY: ICD-10-CM

## 2023-01-01 DIAGNOSIS — N17.0 ACUTE KIDNEY FAILURE WITH TUBULAR NECROSIS: ICD-10-CM

## 2023-01-01 DIAGNOSIS — K63.2 FISTULA OF INTESTINE: ICD-10-CM

## 2023-01-01 DIAGNOSIS — E78.5 HYPERLIPIDEMIA, UNSPECIFIED: ICD-10-CM

## 2023-01-01 DIAGNOSIS — J90 PLEURAL EFFUSION, NOT ELSEWHERE CLASSIFIED: ICD-10-CM

## 2023-01-01 DIAGNOSIS — J81.1 CHRONIC PULMONARY EDEMA: ICD-10-CM

## 2023-01-01 DIAGNOSIS — J18.9 PNEUMONIA, UNSPECIFIED ORGANISM: ICD-10-CM

## 2023-01-01 DIAGNOSIS — T80.211A BLOODSTREAM INFECTION DUE TO CENTRAL VENOUS CATHETER, INITIAL ENCOUNTER: ICD-10-CM

## 2023-01-01 DIAGNOSIS — D84.9 IMMUNODEFICIENCY, UNSPECIFIED: ICD-10-CM

## 2023-01-01 DIAGNOSIS — J93.9 PNEUMOTHORAX, UNSPECIFIED: ICD-10-CM

## 2023-01-01 DIAGNOSIS — I48.0 PAROXYSMAL ATRIAL FIBRILLATION: ICD-10-CM

## 2023-01-01 DIAGNOSIS — J98.11 ATELECTASIS: ICD-10-CM

## 2023-01-01 DIAGNOSIS — T44.3X5A ADVERSE EFFECT OF OTHER PARASYMPATHOLYTICS [ANTICHOLINERGICS AND ANTIMUSCARINICS] AND SPASMOLYTICS, INITIAL ENCOUNTER: ICD-10-CM

## 2023-01-01 DIAGNOSIS — G93.41 METABOLIC ENCEPHALOPATHY: ICD-10-CM

## 2023-01-01 DIAGNOSIS — E87.1 HYPO-OSMOLALITY AND HYPONATREMIA: ICD-10-CM

## 2023-01-01 DIAGNOSIS — E87.5 HYPERKALEMIA: ICD-10-CM

## 2023-01-01 DIAGNOSIS — E46 UNSPECIFIED PROTEIN-CALORIE MALNUTRITION: ICD-10-CM

## 2023-01-01 DIAGNOSIS — E87.20 ACIDOSIS, UNSPECIFIED: ICD-10-CM

## 2023-01-01 DIAGNOSIS — K91.89 OTHER POSTPROCEDURAL COMPLICATIONS AND DISORDERS OF DIGESTIVE SYSTEM: ICD-10-CM

## 2023-01-01 DIAGNOSIS — M10.9 GOUT, UNSPECIFIED: ICD-10-CM

## 2023-01-01 DIAGNOSIS — K65.8 OTHER PERITONITIS: ICD-10-CM

## 2023-01-01 DIAGNOSIS — G25.81 RESTLESS LEGS SYNDROME: ICD-10-CM

## 2023-01-01 DIAGNOSIS — I82.622 ACUTE EMBOLISM AND THROMBOSIS OF DEEP VEINS OF LEFT UPPER EXTREMITY: ICD-10-CM

## 2023-01-01 DIAGNOSIS — Z98.890 OTHER SPECIFIED POSTPROCEDURAL STATES: Chronic | ICD-10-CM

## 2023-01-01 DIAGNOSIS — I48.92 UNSPECIFIED ATRIAL FLUTTER: ICD-10-CM

## 2023-01-01 DIAGNOSIS — K86.2 CYST OF PANCREAS: ICD-10-CM

## 2023-01-01 DIAGNOSIS — K50.012 CROHN'S DISEASE OF SMALL INTESTINE WITH INTESTINAL OBSTRUCTION: ICD-10-CM

## 2023-01-01 DIAGNOSIS — E03.9 HYPOTHYROIDISM, UNSPECIFIED: ICD-10-CM

## 2023-01-01 DIAGNOSIS — G25.2 OTHER SPECIFIED FORMS OF TREMOR: ICD-10-CM

## 2023-01-01 DIAGNOSIS — B49 UNSPECIFIED MYCOSIS: ICD-10-CM

## 2023-01-01 DIAGNOSIS — K31.84 GASTROPARESIS: ICD-10-CM

## 2023-01-01 LAB
% ALBUMIN: 34.6 % — SIGNIFICANT CHANGE UP
% ALBUMIN: 34.6 % — SIGNIFICANT CHANGE UP
% ALPHA 1: 6.5 % — SIGNIFICANT CHANGE UP
% ALPHA 1: 6.5 % — SIGNIFICANT CHANGE UP
% ALPHA 2: 8 % — SIGNIFICANT CHANGE UP
% ALPHA 2: 8 % — SIGNIFICANT CHANGE UP
% BETA: 11 % — SIGNIFICANT CHANGE UP
% BETA: 11 % — SIGNIFICANT CHANGE UP
% GAMMA, URINE: 29.4 % — SIGNIFICANT CHANGE UP
% GAMMA, URINE: 29.4 % — SIGNIFICANT CHANGE UP
% GAMMA: 39.9 % — SIGNIFICANT CHANGE UP
% GAMMA: 39.9 % — SIGNIFICANT CHANGE UP
% M SPIKE: 21 % — SIGNIFICANT CHANGE UP
% M SPIKE: 21 % — SIGNIFICANT CHANGE UP
-  AMOXICILLIN/CLAVULANIC ACID: SIGNIFICANT CHANGE UP
-  AMPHOTERICIN B: SIGNIFICANT CHANGE UP
-  AMPHOTERICIN B: SIGNIFICANT CHANGE UP
-  AMPICILLIN/SULBACTAM: SIGNIFICANT CHANGE UP
-  AMPICILLIN/SULBACTAM: SIGNIFICANT CHANGE UP
-  AMPICILLIN: SIGNIFICANT CHANGE UP
-  ANIDULAFUNGIN: SIGNIFICANT CHANGE UP
-  ANIDULAFUNGIN: SIGNIFICANT CHANGE UP
-  AZTREONAM: SIGNIFICANT CHANGE UP
-  BACTEROIDES FRAGILIS: SIGNIFICANT CHANGE UP
-  BACTEROIDES FRAGILIS: SIGNIFICANT CHANGE UP
-  CANDIDA ALBICANS: SIGNIFICANT CHANGE UP
-  CANDIDA ALBICANS: SIGNIFICANT CHANGE UP
-  CANDIDA GLABRATA: SIGNIFICANT CHANGE UP
-  CANDIDA KRUSEI: SIGNIFICANT CHANGE UP
-  CANDIDA KRUSEI: SIGNIFICANT CHANGE UP
-  CANDIDA PARAPSILOSIS: SIGNIFICANT CHANGE UP
-  CANDIDA PARAPSILOSIS: SIGNIFICANT CHANGE UP
-  CANDIDA TROPICALIS: SIGNIFICANT CHANGE UP
-  CANDIDA TROPICALIS: SIGNIFICANT CHANGE UP
-  CASPOFUNGIN: SIGNIFICANT CHANGE UP
-  CASPOFUNGIN: SIGNIFICANT CHANGE UP
-  CEFAZOLIN: SIGNIFICANT CHANGE UP
-  CEFAZOLIN: SIGNIFICANT CHANGE UP
-  CEFEPIME: SIGNIFICANT CHANGE UP
-  CEFTRIAXONE: SIGNIFICANT CHANGE UP
-  CEFTRIAXONE: SIGNIFICANT CHANGE UP
-  CIPROFLOXACIN: SIGNIFICANT CHANGE UP
-  CLINDAMYCIN: SIGNIFICANT CHANGE UP
-  COAGULASE NEGATIVE STAPHYLOCOCCUS, METHICILLIN RESISTANT: SIGNIFICANT CHANGE UP
-  COAGULASE NEGATIVE STAPHYLOCOCCUS, METHICILLIN RESISTANT: SIGNIFICANT CHANGE UP
-  COAGULASE NEGATIVE STAPHYLOCOCCUS: SIGNIFICANT CHANGE UP
-  COAGULASE NEGATIVE STAPHYLOCOCCUS: SIGNIFICANT CHANGE UP
-  DAPTOMYCIN: SIGNIFICANT CHANGE UP
-  ENTEROBACTERALES: SIGNIFICANT CHANGE UP
-  ENTEROBACTERALES: SIGNIFICANT CHANGE UP
-  ERTAPENEM: SIGNIFICANT CHANGE UP
-  FLUCONAZOLE: SIGNIFICANT CHANGE UP
-  FLUCONAZOLE: SIGNIFICANT CHANGE UP
-  GENTAMICIN: SIGNIFICANT CHANGE UP
-  GENTAMICIN: SIGNIFICANT CHANGE UP
-  K. PNEUMONIAE GROUP: SIGNIFICANT CHANGE UP
-  K. PNEUMONIAE GROUP: SIGNIFICANT CHANGE UP
-  LEVOFLOXACIN: SIGNIFICANT CHANGE UP
-  LINEZOLID: SIGNIFICANT CHANGE UP
-  MEROPENEM: SIGNIFICANT CHANGE UP
-  METRONIDAZOLE: SIGNIFICANT CHANGE UP
-  MICAFUNGIN: SIGNIFICANT CHANGE UP
-  MICAFUNGIN: SIGNIFICANT CHANGE UP
-  PIPERACILLIN/TAZOBACTAM: SIGNIFICANT CHANGE UP
-  POSACONAZOLE: SIGNIFICANT CHANGE UP
-  POSACONAZOLE: SIGNIFICANT CHANGE UP
-  STAPHYLOCOCCUS EPIDERMIDIS, METHICILLIN RESISTANT: SIGNIFICANT CHANGE UP
-  STAPHYLOCOCCUS EPIDERMIDIS, METHICILLIN RESISTANT: SIGNIFICANT CHANGE UP
-  STAPHYLOCOCCUS EPIDERMIDIS: SIGNIFICANT CHANGE UP
-  STAPHYLOCOCCUS EPIDERMIDIS: SIGNIFICANT CHANGE UP
-  STAPHYLOCOCCUS LUGDUNENSIS, METHICILLIN RESISTANT: SIGNIFICANT CHANGE UP
-  STAPHYLOCOCCUS LUGDUNENSIS, METHICILLIN RESISTANT: SIGNIFICANT CHANGE UP
-  STENOTROPHOMONAS MALTOPHILIA: SIGNIFICANT CHANGE UP
-  STENOTROPHOMONAS MALTOPHILIA: SIGNIFICANT CHANGE UP
-  STREPTOCOCCUS SP. (NOT GRP A, B OR S PNEUMONIAE): SIGNIFICANT CHANGE UP
-  STREPTOCOCCUS SP. (NOT GRP A, B OR S PNEUMONIAE): SIGNIFICANT CHANGE UP
-  TOBRAMYCIN: SIGNIFICANT CHANGE UP
-  TRIMETHOPRIM/SULFAMETHOXAZOLE: SIGNIFICANT CHANGE UP
-  TRIMETHOPRIM/SULFAMETHOXAZOLE: SIGNIFICANT CHANGE UP
-  VANCOMYCIN: SIGNIFICANT CHANGE UP
-  VORICONAZOLE: SIGNIFICANT CHANGE UP
-  VORICONAZOLE: SIGNIFICANT CHANGE UP
1,3 BETA GLUCAN SER QL: ABNORMAL
1,3 BETA GLUCAN SER QL: ABNORMAL
1,3 BETA GLUCAN SER QL: NEGATIVE — SIGNIFICANT CHANGE UP
1,3 BETA GLUCAN SER QL: NEGATIVE — SIGNIFICANT CHANGE UP
1,3 BETA GLUCAN SER QL: POSITIVE
1,3 BETA GLUCAN SER QL: POSITIVE
24R-OH-CALCIDIOL SERPL-MCNC: 14.1 NG/ML — LOW (ref 30–80)
24R-OH-CALCIDIOL SERPL-MCNC: 14.1 NG/ML — LOW (ref 30–80)
A BAUMANNII DNA SPEC QL NAA+PROBE: SIGNIFICANT CHANGE UP
A BAUMANNII DNA SPEC QL NAA+PROBE: SIGNIFICANT CHANGE UP
A1C WITH ESTIMATED AVERAGE GLUCOSE RESULT: 4.8 % — SIGNIFICANT CHANGE UP (ref 4–5.6)
A1C WITH ESTIMATED AVERAGE GLUCOSE RESULT: 5.1 % — SIGNIFICANT CHANGE UP (ref 4–5.6)
ALBUMIN 24H MFR UR ELPH: 13.6 % — SIGNIFICANT CHANGE UP
ALBUMIN 24H MFR UR ELPH: 13.6 % — SIGNIFICANT CHANGE UP
ALBUMIN SERPL ELPH-MCNC: 1.8 G/DL — LOW (ref 3.3–5)
ALBUMIN SERPL ELPH-MCNC: 1.9 G/DL — LOW (ref 3.3–5)
ALBUMIN SERPL ELPH-MCNC: 2 G/DL — LOW (ref 3.3–5)
ALBUMIN SERPL ELPH-MCNC: 2.1 G/DL — LOW (ref 3.3–5)
ALBUMIN SERPL ELPH-MCNC: 2.2 G/DL — LOW (ref 3.3–5)
ALBUMIN SERPL ELPH-MCNC: 2.3 G/DL — LOW (ref 3.3–5)
ALBUMIN SERPL ELPH-MCNC: 2.4 G/DL — LOW (ref 3.3–5)
ALBUMIN SERPL ELPH-MCNC: 2.5 G/DL — LOW (ref 3.3–5)
ALBUMIN SERPL ELPH-MCNC: 2.6 G/DL — LOW (ref 3.3–5)
ALBUMIN SERPL ELPH-MCNC: 2.8 G/DL — LOW (ref 3.3–5)
ALBUMIN SERPL ELPH-MCNC: 2.8 G/DL — LOW (ref 3.6–5.5)
ALBUMIN SERPL ELPH-MCNC: 2.8 G/DL — LOW (ref 3.6–5.5)
ALBUMIN SERPL ELPH-MCNC: 2.9 G/DL — LOW (ref 3.3–5)
ALBUMIN SERPL ELPH-MCNC: 3 G/DL — LOW (ref 3.3–5)
ALBUMIN SERPL ELPH-MCNC: 3.2 G/DL — LOW (ref 3.3–5)
ALBUMIN SERPL ELPH-MCNC: 3.2 G/DL — LOW (ref 3.3–5)
ALBUMIN SERPL ELPH-MCNC: 4 G/DL — SIGNIFICANT CHANGE UP (ref 3.3–5)
ALBUMIN/GLOB SERPL ELPH: 0.5 RATIO — SIGNIFICANT CHANGE UP
ALBUMIN/GLOB SERPL ELPH: 0.5 RATIO — SIGNIFICANT CHANGE UP
ALP SERPL-CCNC: 103 U/L — SIGNIFICANT CHANGE UP (ref 40–120)
ALP SERPL-CCNC: 109 U/L — SIGNIFICANT CHANGE UP (ref 40–120)
ALP SERPL-CCNC: 112 U/L — SIGNIFICANT CHANGE UP (ref 40–120)
ALP SERPL-CCNC: 114 U/L — SIGNIFICANT CHANGE UP (ref 40–120)
ALP SERPL-CCNC: 115 U/L — SIGNIFICANT CHANGE UP (ref 40–120)
ALP SERPL-CCNC: 115 U/L — SIGNIFICANT CHANGE UP (ref 40–120)
ALP SERPL-CCNC: 116 U/L — SIGNIFICANT CHANGE UP (ref 40–120)
ALP SERPL-CCNC: 117 U/L — SIGNIFICANT CHANGE UP (ref 40–120)
ALP SERPL-CCNC: 117 U/L — SIGNIFICANT CHANGE UP (ref 40–120)
ALP SERPL-CCNC: 118 U/L — SIGNIFICANT CHANGE UP (ref 40–120)
ALP SERPL-CCNC: 118 U/L — SIGNIFICANT CHANGE UP (ref 40–120)
ALP SERPL-CCNC: 119 U/L — SIGNIFICANT CHANGE UP (ref 40–120)
ALP SERPL-CCNC: 120 U/L — SIGNIFICANT CHANGE UP (ref 40–120)
ALP SERPL-CCNC: 121 U/L — HIGH (ref 40–120)
ALP SERPL-CCNC: 122 U/L — HIGH (ref 40–120)
ALP SERPL-CCNC: 124 U/L — HIGH (ref 40–120)
ALP SERPL-CCNC: 126 U/L — HIGH (ref 40–120)
ALP SERPL-CCNC: 126 U/L — HIGH (ref 40–120)
ALP SERPL-CCNC: 127 U/L — HIGH (ref 40–120)
ALP SERPL-CCNC: 128 U/L — HIGH (ref 40–120)
ALP SERPL-CCNC: 129 U/L — HIGH (ref 40–120)
ALP SERPL-CCNC: 130 U/L — HIGH (ref 40–120)
ALP SERPL-CCNC: 131 U/L — HIGH (ref 40–120)
ALP SERPL-CCNC: 132 U/L — HIGH (ref 40–120)
ALP SERPL-CCNC: 133 U/L — HIGH (ref 40–120)
ALP SERPL-CCNC: 134 U/L — HIGH (ref 40–120)
ALP SERPL-CCNC: 134 U/L — HIGH (ref 40–120)
ALP SERPL-CCNC: 135 U/L — HIGH (ref 40–120)
ALP SERPL-CCNC: 136 U/L — HIGH (ref 40–120)
ALP SERPL-CCNC: 140 U/L — HIGH (ref 40–120)
ALP SERPL-CCNC: 140 U/L — HIGH (ref 40–120)
ALP SERPL-CCNC: 142 U/L — HIGH (ref 40–120)
ALP SERPL-CCNC: 142 U/L — HIGH (ref 40–120)
ALP SERPL-CCNC: 143 U/L — HIGH (ref 40–120)
ALP SERPL-CCNC: 145 U/L — HIGH (ref 40–120)
ALP SERPL-CCNC: 145 U/L — HIGH (ref 40–120)
ALP SERPL-CCNC: 146 U/L — HIGH (ref 40–120)
ALP SERPL-CCNC: 146 U/L — HIGH (ref 40–120)
ALP SERPL-CCNC: 147 U/L — HIGH (ref 40–120)
ALP SERPL-CCNC: 148 U/L — HIGH (ref 40–120)
ALP SERPL-CCNC: 148 U/L — HIGH (ref 40–120)
ALP SERPL-CCNC: 149 U/L — HIGH (ref 40–120)
ALP SERPL-CCNC: 150 U/L — HIGH (ref 40–120)
ALP SERPL-CCNC: 151 U/L — HIGH (ref 40–120)
ALP SERPL-CCNC: 152 U/L — HIGH (ref 40–120)
ALP SERPL-CCNC: 152 U/L — HIGH (ref 40–120)
ALP SERPL-CCNC: 154 U/L — HIGH (ref 40–120)
ALP SERPL-CCNC: 155 U/L — HIGH (ref 40–120)
ALP SERPL-CCNC: 156 U/L — HIGH (ref 40–120)
ALP SERPL-CCNC: 157 U/L — HIGH (ref 40–120)
ALP SERPL-CCNC: 158 U/L — HIGH (ref 40–120)
ALP SERPL-CCNC: 159 U/L — HIGH (ref 40–120)
ALP SERPL-CCNC: 161 U/L — HIGH (ref 40–120)
ALP SERPL-CCNC: 162 U/L — HIGH (ref 40–120)
ALP SERPL-CCNC: 164 U/L — HIGH (ref 40–120)
ALP SERPL-CCNC: 166 U/L — HIGH (ref 40–120)
ALP SERPL-CCNC: 166 U/L — HIGH (ref 40–120)
ALP SERPL-CCNC: 168 U/L — HIGH (ref 40–120)
ALP SERPL-CCNC: 170 U/L — HIGH (ref 40–120)
ALP SERPL-CCNC: 171 U/L — HIGH (ref 40–120)
ALP SERPL-CCNC: 172 U/L — HIGH (ref 40–120)
ALP SERPL-CCNC: 173 U/L — HIGH (ref 40–120)
ALP SERPL-CCNC: 173 U/L — HIGH (ref 40–120)
ALP SERPL-CCNC: 174 U/L — HIGH (ref 40–120)
ALP SERPL-CCNC: 176 U/L — HIGH (ref 40–120)
ALP SERPL-CCNC: 177 U/L — HIGH (ref 40–120)
ALP SERPL-CCNC: 177 U/L — HIGH (ref 40–120)
ALP SERPL-CCNC: 178 U/L — HIGH (ref 40–120)
ALP SERPL-CCNC: 179 U/L — HIGH (ref 40–120)
ALP SERPL-CCNC: 183 U/L — HIGH (ref 40–120)
ALP SERPL-CCNC: 186 U/L — HIGH (ref 40–120)
ALP SERPL-CCNC: 196 U/L — HIGH (ref 40–120)
ALP SERPL-CCNC: 198 U/L — HIGH (ref 40–120)
ALP SERPL-CCNC: 199 U/L — HIGH (ref 40–120)
ALP SERPL-CCNC: 206 U/L — HIGH (ref 40–120)
ALP SERPL-CCNC: 206 U/L — HIGH (ref 40–120)
ALP SERPL-CCNC: 209 U/L — HIGH (ref 40–120)
ALP SERPL-CCNC: 211 U/L — HIGH (ref 40–120)
ALP SERPL-CCNC: 217 U/L — HIGH (ref 40–120)
ALP SERPL-CCNC: 217 U/L — HIGH (ref 40–120)
ALP SERPL-CCNC: 224 U/L — HIGH (ref 40–120)
ALP SERPL-CCNC: 229 U/L — HIGH (ref 40–120)
ALP SERPL-CCNC: 234 U/L — HIGH (ref 40–120)
ALP SERPL-CCNC: 234 U/L — HIGH (ref 40–120)
ALP SERPL-CCNC: 247 U/L — HIGH (ref 40–120)
ALP SERPL-CCNC: 247 U/L — HIGH (ref 40–120)
ALP SERPL-CCNC: 262 U/L — HIGH (ref 40–120)
ALP SERPL-CCNC: 262 U/L — HIGH (ref 40–120)
ALP SERPL-CCNC: 35 U/L — LOW (ref 40–120)
ALP SERPL-CCNC: 49 U/L — SIGNIFICANT CHANGE UP (ref 40–120)
ALP SERPL-CCNC: 54 U/L — SIGNIFICANT CHANGE UP (ref 40–120)
ALP SERPL-CCNC: 66 U/L — SIGNIFICANT CHANGE UP (ref 40–120)
ALP SERPL-CCNC: 70 U/L — SIGNIFICANT CHANGE UP (ref 40–120)
ALP SERPL-CCNC: 72 U/L — SIGNIFICANT CHANGE UP (ref 40–120)
ALP SERPL-CCNC: 80 U/L — SIGNIFICANT CHANGE UP (ref 40–120)
ALP SERPL-CCNC: 80 U/L — SIGNIFICANT CHANGE UP (ref 40–120)
ALP SERPL-CCNC: 85 U/L — SIGNIFICANT CHANGE UP (ref 40–120)
ALP SERPL-CCNC: 88 U/L — SIGNIFICANT CHANGE UP (ref 40–120)
ALP SERPL-CCNC: 89 U/L — SIGNIFICANT CHANGE UP (ref 40–120)
ALP SERPL-CCNC: 90 U/L — SIGNIFICANT CHANGE UP (ref 40–120)
ALPHA1 GLOB 24H MFR UR ELPH: 22.5 % — SIGNIFICANT CHANGE UP
ALPHA1 GLOB 24H MFR UR ELPH: 22.5 % — SIGNIFICANT CHANGE UP
ALPHA1 GLOB SERPL ELPH-MCNC: 0.5 G/DL — HIGH (ref 0.1–0.4)
ALPHA1 GLOB SERPL ELPH-MCNC: 0.5 G/DL — HIGH (ref 0.1–0.4)
ALPHA2 GLOB 24H MFR UR ELPH: 13.1 % — SIGNIFICANT CHANGE UP
ALPHA2 GLOB 24H MFR UR ELPH: 13.1 % — SIGNIFICANT CHANGE UP
ALPHA2 GLOB SERPL ELPH-MCNC: 0.7 G/DL — SIGNIFICANT CHANGE UP (ref 0.5–1)
ALPHA2 GLOB SERPL ELPH-MCNC: 0.7 G/DL — SIGNIFICANT CHANGE UP (ref 0.5–1)
ALT FLD-CCNC: 10 U/L — SIGNIFICANT CHANGE UP (ref 10–45)
ALT FLD-CCNC: 101 U/L — HIGH (ref 10–45)
ALT FLD-CCNC: 102 U/L — HIGH (ref 10–45)
ALT FLD-CCNC: 103 U/L — HIGH (ref 10–45)
ALT FLD-CCNC: 104 U/L — HIGH (ref 10–45)
ALT FLD-CCNC: 106 U/L — HIGH (ref 10–45)
ALT FLD-CCNC: 106 U/L — HIGH (ref 10–45)
ALT FLD-CCNC: 110 U/L — HIGH (ref 10–45)
ALT FLD-CCNC: 112 U/L — HIGH (ref 10–45)
ALT FLD-CCNC: 119 U/L — HIGH (ref 10–45)
ALT FLD-CCNC: 128 U/L — HIGH (ref 10–45)
ALT FLD-CCNC: 130 U/L — HIGH (ref 10–45)
ALT FLD-CCNC: 130 U/L — HIGH (ref 10–45)
ALT FLD-CCNC: 132 U/L — HIGH (ref 10–45)
ALT FLD-CCNC: 133 U/L — HIGH (ref 10–45)
ALT FLD-CCNC: 136 U/L — HIGH (ref 10–45)
ALT FLD-CCNC: 140 U/L — HIGH (ref 10–45)
ALT FLD-CCNC: 16 U/L — SIGNIFICANT CHANGE UP (ref 10–45)
ALT FLD-CCNC: 31 U/L — SIGNIFICANT CHANGE UP (ref 10–45)
ALT FLD-CCNC: 31 U/L — SIGNIFICANT CHANGE UP (ref 10–45)
ALT FLD-CCNC: 33 U/L — SIGNIFICANT CHANGE UP (ref 10–45)
ALT FLD-CCNC: 35 U/L — SIGNIFICANT CHANGE UP (ref 10–45)
ALT FLD-CCNC: 36 U/L — SIGNIFICANT CHANGE UP (ref 10–45)
ALT FLD-CCNC: 36 U/L — SIGNIFICANT CHANGE UP (ref 10–45)
ALT FLD-CCNC: 38 U/L — SIGNIFICANT CHANGE UP (ref 10–45)
ALT FLD-CCNC: 38 U/L — SIGNIFICANT CHANGE UP (ref 10–45)
ALT FLD-CCNC: 39 U/L — SIGNIFICANT CHANGE UP (ref 10–45)
ALT FLD-CCNC: 39 U/L — SIGNIFICANT CHANGE UP (ref 10–45)
ALT FLD-CCNC: 41 U/L — SIGNIFICANT CHANGE UP (ref 10–45)
ALT FLD-CCNC: 46 U/L — HIGH (ref 10–45)
ALT FLD-CCNC: 52 U/L — HIGH (ref 10–45)
ALT FLD-CCNC: 53 U/L — HIGH (ref 10–45)
ALT FLD-CCNC: 53 U/L — HIGH (ref 10–45)
ALT FLD-CCNC: 59 U/L — HIGH (ref 10–45)
ALT FLD-CCNC: 61 U/L — HIGH (ref 10–45)
ALT FLD-CCNC: 62 U/L — HIGH (ref 10–45)
ALT FLD-CCNC: 63 U/L — HIGH (ref 10–45)
ALT FLD-CCNC: 63 U/L — HIGH (ref 10–45)
ALT FLD-CCNC: 64 U/L — HIGH (ref 10–45)
ALT FLD-CCNC: 65 U/L — HIGH (ref 10–45)
ALT FLD-CCNC: 66 U/L — HIGH (ref 10–45)
ALT FLD-CCNC: 68 U/L — HIGH (ref 10–45)
ALT FLD-CCNC: 70 U/L — HIGH (ref 10–45)
ALT FLD-CCNC: 71 U/L — HIGH (ref 10–45)
ALT FLD-CCNC: 72 U/L — HIGH (ref 10–45)
ALT FLD-CCNC: 73 U/L — HIGH (ref 10–45)
ALT FLD-CCNC: 73 U/L — HIGH (ref 10–45)
ALT FLD-CCNC: 74 U/L — HIGH (ref 10–45)
ALT FLD-CCNC: 74 U/L — HIGH (ref 10–45)
ALT FLD-CCNC: 75 U/L — HIGH (ref 10–45)
ALT FLD-CCNC: 75 U/L — HIGH (ref 10–45)
ALT FLD-CCNC: 76 U/L — HIGH (ref 10–45)
ALT FLD-CCNC: 77 U/L — HIGH (ref 10–45)
ALT FLD-CCNC: 78 U/L — HIGH (ref 10–45)
ALT FLD-CCNC: 79 U/L — HIGH (ref 10–45)
ALT FLD-CCNC: 80 U/L — HIGH (ref 10–45)
ALT FLD-CCNC: 80 U/L — HIGH (ref 10–45)
ALT FLD-CCNC: 81 U/L — HIGH (ref 10–45)
ALT FLD-CCNC: 82 U/L — HIGH (ref 10–45)
ALT FLD-CCNC: 82 U/L — HIGH (ref 10–45)
ALT FLD-CCNC: 83 U/L — HIGH (ref 10–45)
ALT FLD-CCNC: 84 U/L — HIGH (ref 10–45)
ALT FLD-CCNC: 85 U/L — HIGH (ref 10–45)
ALT FLD-CCNC: 86 U/L — HIGH (ref 10–45)
ALT FLD-CCNC: 86 U/L — HIGH (ref 10–45)
ALT FLD-CCNC: 87 U/L — HIGH (ref 10–45)
ALT FLD-CCNC: 88 U/L — HIGH (ref 10–45)
ALT FLD-CCNC: 89 U/L — HIGH (ref 10–45)
ALT FLD-CCNC: 9 U/L — LOW (ref 10–45)
ALT FLD-CCNC: 9 U/L — LOW (ref 10–45)
ALT FLD-CCNC: 90 U/L — HIGH (ref 10–45)
ALT FLD-CCNC: 90 U/L — HIGH (ref 10–45)
ALT FLD-CCNC: 91 U/L — HIGH (ref 10–45)
ALT FLD-CCNC: 92 U/L — HIGH (ref 10–45)
ALT FLD-CCNC: 92 U/L — HIGH (ref 10–45)
ALT FLD-CCNC: 94 U/L — HIGH (ref 10–45)
ALT FLD-CCNC: 94 U/L — HIGH (ref 10–45)
ALT FLD-CCNC: 95 U/L — HIGH (ref 10–45)
ALT FLD-CCNC: 96 U/L — HIGH (ref 10–45)
ALT FLD-CCNC: 97 U/L — HIGH (ref 10–45)
ALT FLD-CCNC: 97 U/L — HIGH (ref 10–45)
ALT FLD-CCNC: 98 U/L — HIGH (ref 10–45)
ALT FLD-CCNC: 99 U/L — HIGH (ref 10–45)
ALT FLD-CCNC: SIGNIFICANT CHANGE UP U/L (ref 10–45)
AMMONIA BLD-MCNC: 26 UMOL/L — SIGNIFICANT CHANGE UP (ref 11–55)
AMMONIA BLD-MCNC: 33 UMOL/L — SIGNIFICANT CHANGE UP (ref 11–55)
AMMONIA BLD-MCNC: 40 UMOL/L — SIGNIFICANT CHANGE UP (ref 11–55)
AMORPH SED URNS QL MICRO: PRESENT
AMORPH URATE CRY # URNS: PRESENT
AMORPH URATE CRY # URNS: PRESENT
AMPHET UR-MCNC: NEGATIVE — SIGNIFICANT CHANGE UP
AMPHET UR-MCNC: NEGATIVE — SIGNIFICANT CHANGE UP
ANA PAT FLD IF-IMP: ABNORMAL
ANA TITR SER: ABNORMAL
ANION GAP SERPL CALC-SCNC: 10 MMOL/L — SIGNIFICANT CHANGE UP (ref 5–17)
ANION GAP SERPL CALC-SCNC: 11 MMOL/L — SIGNIFICANT CHANGE UP (ref 5–17)
ANION GAP SERPL CALC-SCNC: 12 MMOL/L — SIGNIFICANT CHANGE UP (ref 5–17)
ANION GAP SERPL CALC-SCNC: 13 MMOL/L — SIGNIFICANT CHANGE UP (ref 5–17)
ANION GAP SERPL CALC-SCNC: 15 MMOL/L — SIGNIFICANT CHANGE UP (ref 5–17)
ANION GAP SERPL CALC-SCNC: 16 MMOL/L — SIGNIFICANT CHANGE UP (ref 5–17)
ANION GAP SERPL CALC-SCNC: 16 MMOL/L — SIGNIFICANT CHANGE UP (ref 5–17)
ANION GAP SERPL CALC-SCNC: 4 MMOL/L — LOW (ref 5–17)
ANION GAP SERPL CALC-SCNC: 5 MMOL/L — SIGNIFICANT CHANGE UP (ref 5–17)
ANION GAP SERPL CALC-SCNC: 6 MMOL/L — SIGNIFICANT CHANGE UP (ref 5–17)
ANION GAP SERPL CALC-SCNC: 7 MMOL/L — SIGNIFICANT CHANGE UP (ref 5–17)
ANION GAP SERPL CALC-SCNC: 8 MMOL/L — SIGNIFICANT CHANGE UP (ref 5–17)
ANION GAP SERPL CALC-SCNC: 9 MMOL/L — SIGNIFICANT CHANGE UP (ref 5–17)
ANION GAP SERPL CALC-SCNC: SIGNIFICANT CHANGE UP MMOL/L (ref 5–17)
ANISOCYTOSIS BLD QL: SIGNIFICANT CHANGE UP
ANISOCYTOSIS BLD QL: SLIGHT — SIGNIFICANT CHANGE UP
APPEARANCE UR: ABNORMAL
APPEARANCE UR: CLEAR — SIGNIFICANT CHANGE UP
APTT BLD: 23.5 SEC — LOW (ref 27.5–35.5)
APTT BLD: 25 SEC — LOW (ref 27.5–35.5)
APTT BLD: 25.4 SEC — LOW (ref 27.5–35.5)
APTT BLD: 27.1 SEC — LOW (ref 27.5–35.5)
APTT BLD: 27.4 SEC — LOW (ref 27.5–35.5)
APTT BLD: 27.7 SEC — SIGNIFICANT CHANGE UP (ref 27.5–35.5)
APTT BLD: 30.7 SEC — SIGNIFICANT CHANGE UP (ref 27.5–35.5)
APTT BLD: 30.9 SEC — SIGNIFICANT CHANGE UP (ref 24.5–35.6)
APTT BLD: 31.5 SEC — SIGNIFICANT CHANGE UP (ref 27.5–35.5)
APTT BLD: 32 SEC — SIGNIFICANT CHANGE UP (ref 24.5–35.6)
APTT BLD: 34.1 SEC — SIGNIFICANT CHANGE UP (ref 24.5–35.6)
APTT BLD: 34.1 SEC — SIGNIFICANT CHANGE UP (ref 24.5–35.6)
APTT BLD: 34.6 SEC — SIGNIFICANT CHANGE UP (ref 24.5–35.6)
APTT BLD: 34.6 SEC — SIGNIFICANT CHANGE UP (ref 24.5–35.6)
APTT BLD: 35 SEC — SIGNIFICANT CHANGE UP (ref 24.5–35.6)
APTT BLD: 35.2 SEC — SIGNIFICANT CHANGE UP (ref 24.5–35.6)
APTT BLD: 35.2 SEC — SIGNIFICANT CHANGE UP (ref 24.5–35.6)
APTT BLD: 35.6 SEC — SIGNIFICANT CHANGE UP (ref 24.5–35.6)
APTT BLD: 36.3 SEC — HIGH (ref 24.5–35.6)
APTT BLD: 36.3 SEC — HIGH (ref 24.5–35.6)
APTT BLD: 38.4 SEC — HIGH (ref 24.5–35.6)
APTT BLD: 38.4 SEC — HIGH (ref 24.5–35.6)
APTT BLD: 39.4 SEC — HIGH (ref 24.5–35.6)
APTT BLD: 39.4 SEC — HIGH (ref 24.5–35.6)
APTT BLD: 39.6 SEC — HIGH (ref 24.5–35.6)
APTT BLD: 39.6 SEC — HIGH (ref 24.5–35.6)
APTT BLD: 39.8 SEC — HIGH (ref 24.5–35.6)
APTT BLD: 39.8 SEC — HIGH (ref 24.5–35.6)
APTT BLD: 42.1 SEC — HIGH (ref 24.5–35.6)
APTT BLD: 42.1 SEC — HIGH (ref 24.5–35.6)
APTT BLD: 42.6 SEC — HIGH (ref 24.5–35.6)
APTT BLD: 42.6 SEC — HIGH (ref 24.5–35.6)
APTT BLD: 43.1 SEC — HIGH (ref 24.5–35.6)
APTT BLD: 43.1 SEC — HIGH (ref 24.5–35.6)
APTT BLD: 43.5 SEC — HIGH (ref 24.5–35.6)
APTT BLD: 43.5 SEC — HIGH (ref 24.5–35.6)
APTT BLD: 45.4 SEC — HIGH (ref 24.5–35.6)
APTT BLD: 45.4 SEC — HIGH (ref 24.5–35.6)
APTT BLD: 54.5 SEC — HIGH (ref 24.5–35.6)
APTT BLD: 54.5 SEC — HIGH (ref 24.5–35.6)
APTT BLD: 69.6 SEC — HIGH (ref 24.5–35.6)
APTT BLD: 69.6 SEC — HIGH (ref 24.5–35.6)
AST SERPL-CCNC: 100 U/L — HIGH (ref 10–40)
AST SERPL-CCNC: 102 U/L — HIGH (ref 10–40)
AST SERPL-CCNC: 103 U/L — HIGH (ref 10–40)
AST SERPL-CCNC: 103 U/L — HIGH (ref 10–40)
AST SERPL-CCNC: 104 U/L — HIGH (ref 10–40)
AST SERPL-CCNC: 105 U/L — HIGH (ref 10–40)
AST SERPL-CCNC: 106 U/L — HIGH (ref 10–40)
AST SERPL-CCNC: 107 U/L — HIGH (ref 10–40)
AST SERPL-CCNC: 107 U/L — HIGH (ref 10–40)
AST SERPL-CCNC: 108 U/L — HIGH (ref 10–40)
AST SERPL-CCNC: 109 U/L — HIGH (ref 10–40)
AST SERPL-CCNC: 111 U/L — HIGH (ref 10–40)
AST SERPL-CCNC: 113 U/L — HIGH (ref 10–40)
AST SERPL-CCNC: 113 U/L — HIGH (ref 10–40)
AST SERPL-CCNC: 114 U/L — HIGH (ref 10–40)
AST SERPL-CCNC: 115 U/L — HIGH (ref 10–40)
AST SERPL-CCNC: 115 U/L — HIGH (ref 10–40)
AST SERPL-CCNC: 117 U/L — HIGH (ref 10–40)
AST SERPL-CCNC: 118 U/L — HIGH (ref 10–40)
AST SERPL-CCNC: 119 U/L — HIGH (ref 10–40)
AST SERPL-CCNC: 120 U/L — HIGH (ref 10–40)
AST SERPL-CCNC: 120 U/L — HIGH (ref 10–40)
AST SERPL-CCNC: 123 U/L — HIGH (ref 10–40)
AST SERPL-CCNC: 123 U/L — HIGH (ref 10–40)
AST SERPL-CCNC: 125 U/L — HIGH (ref 10–40)
AST SERPL-CCNC: 127 U/L — HIGH (ref 10–40)
AST SERPL-CCNC: 129 U/L — HIGH (ref 10–40)
AST SERPL-CCNC: 130 U/L — HIGH (ref 10–40)
AST SERPL-CCNC: 132 U/L — HIGH (ref 10–40)
AST SERPL-CCNC: 132 U/L — HIGH (ref 10–40)
AST SERPL-CCNC: 133 U/L — HIGH (ref 10–40)
AST SERPL-CCNC: 133 U/L — HIGH (ref 10–40)
AST SERPL-CCNC: 139 U/L — HIGH (ref 10–40)
AST SERPL-CCNC: 140 U/L — HIGH (ref 10–40)
AST SERPL-CCNC: 143 U/L — HIGH (ref 10–40)
AST SERPL-CCNC: 145 U/L — HIGH (ref 10–40)
AST SERPL-CCNC: 146 U/L — HIGH (ref 10–40)
AST SERPL-CCNC: 148 U/L — HIGH (ref 10–40)
AST SERPL-CCNC: 154 U/L — HIGH (ref 10–40)
AST SERPL-CCNC: 159 U/L — HIGH (ref 10–40)
AST SERPL-CCNC: 162 U/L — HIGH (ref 10–40)
AST SERPL-CCNC: 165 U/L — HIGH (ref 10–40)
AST SERPL-CCNC: 165 U/L — HIGH (ref 10–40)
AST SERPL-CCNC: 166 U/L — HIGH (ref 10–40)
AST SERPL-CCNC: 183 U/L — HIGH (ref 10–40)
AST SERPL-CCNC: 183 U/L — HIGH (ref 10–40)
AST SERPL-CCNC: 184 U/L — HIGH (ref 10–40)
AST SERPL-CCNC: 186 U/L — HIGH (ref 10–40)
AST SERPL-CCNC: 188 U/L — HIGH (ref 10–40)
AST SERPL-CCNC: 189 U/L — HIGH (ref 10–40)
AST SERPL-CCNC: 19 U/L — SIGNIFICANT CHANGE UP (ref 10–40)
AST SERPL-CCNC: 21 U/L — SIGNIFICANT CHANGE UP (ref 10–40)
AST SERPL-CCNC: 213 U/L — HIGH (ref 10–40)
AST SERPL-CCNC: 26 U/L — SIGNIFICANT CHANGE UP (ref 10–40)
AST SERPL-CCNC: 27 U/L — SIGNIFICANT CHANGE UP (ref 10–40)
AST SERPL-CCNC: 38 U/L — SIGNIFICANT CHANGE UP (ref 10–40)
AST SERPL-CCNC: 39 U/L — SIGNIFICANT CHANGE UP (ref 10–40)
AST SERPL-CCNC: 44 U/L — HIGH (ref 10–40)
AST SERPL-CCNC: 50 U/L — HIGH (ref 10–40)
AST SERPL-CCNC: 52 U/L — HIGH (ref 10–40)
AST SERPL-CCNC: 53 U/L — HIGH (ref 10–40)
AST SERPL-CCNC: 56 U/L — HIGH (ref 10–40)
AST SERPL-CCNC: 56 U/L — HIGH (ref 10–40)
AST SERPL-CCNC: 57 U/L — HIGH (ref 10–40)
AST SERPL-CCNC: 59 U/L — HIGH (ref 10–40)
AST SERPL-CCNC: 61 U/L — HIGH (ref 10–40)
AST SERPL-CCNC: 62 U/L — HIGH (ref 10–40)
AST SERPL-CCNC: 63 U/L — HIGH (ref 10–40)
AST SERPL-CCNC: 65 U/L — HIGH (ref 10–40)
AST SERPL-CCNC: 66 U/L — HIGH (ref 10–40)
AST SERPL-CCNC: 67 U/L — HIGH (ref 10–40)
AST SERPL-CCNC: 68 U/L — HIGH (ref 10–40)
AST SERPL-CCNC: 69 U/L — HIGH (ref 10–40)
AST SERPL-CCNC: 69 U/L — HIGH (ref 10–40)
AST SERPL-CCNC: 70 U/L — HIGH (ref 10–40)
AST SERPL-CCNC: 71 U/L — HIGH (ref 10–40)
AST SERPL-CCNC: 72 U/L — HIGH (ref 10–40)
AST SERPL-CCNC: 73 U/L — HIGH (ref 10–40)
AST SERPL-CCNC: 75 U/L — HIGH (ref 10–40)
AST SERPL-CCNC: 76 U/L — HIGH (ref 10–40)
AST SERPL-CCNC: 77 U/L — HIGH (ref 10–40)
AST SERPL-CCNC: 78 U/L — HIGH (ref 10–40)
AST SERPL-CCNC: 78 U/L — HIGH (ref 10–40)
AST SERPL-CCNC: 79 U/L — HIGH (ref 10–40)
AST SERPL-CCNC: 80 U/L — HIGH (ref 10–40)
AST SERPL-CCNC: 82 U/L — HIGH (ref 10–40)
AST SERPL-CCNC: 83 U/L — HIGH (ref 10–40)
AST SERPL-CCNC: 84 U/L — HIGH (ref 10–40)
AST SERPL-CCNC: 84 U/L — HIGH (ref 10–40)
AST SERPL-CCNC: 85 U/L — HIGH (ref 10–40)
AST SERPL-CCNC: 85 U/L — HIGH (ref 10–40)
AST SERPL-CCNC: 86 U/L — HIGH (ref 10–40)
AST SERPL-CCNC: 87 U/L — HIGH (ref 10–40)
AST SERPL-CCNC: 89 U/L — HIGH (ref 10–40)
AST SERPL-CCNC: 90 U/L — HIGH (ref 10–40)
AST SERPL-CCNC: 91 U/L — HIGH (ref 10–40)
AST SERPL-CCNC: 92 U/L — HIGH (ref 10–40)
AST SERPL-CCNC: 93 U/L — HIGH (ref 10–40)
AST SERPL-CCNC: 93 U/L — HIGH (ref 10–40)
AST SERPL-CCNC: 94 U/L — HIGH (ref 10–40)
AST SERPL-CCNC: 94 U/L — HIGH (ref 10–40)
AST SERPL-CCNC: 95 U/L — HIGH (ref 10–40)
AST SERPL-CCNC: 96 U/L — HIGH (ref 10–40)
AST SERPL-CCNC: 97 U/L — HIGH (ref 10–40)
AST SERPL-CCNC: 97 U/L — HIGH (ref 10–40)
AST SERPL-CCNC: 98 U/L — HIGH (ref 10–40)
AST SERPL-CCNC: 98 U/L — HIGH (ref 10–40)
AST SERPL-CCNC: SIGNIFICANT CHANGE UP U/L (ref 10–40)
AUTO DIFF PNL BLD: NEGATIVE — SIGNIFICANT CHANGE UP
AUTO DIFF PNL BLD: NEGATIVE — SIGNIFICANT CHANGE UP
B-GLOBULIN 24H MFR UR ELPH: 21.4 % — SIGNIFICANT CHANGE UP
B-GLOBULIN 24H MFR UR ELPH: 21.4 % — SIGNIFICANT CHANGE UP
B-GLOBULIN SERPL ELPH-MCNC: 0.9 G/DL — SIGNIFICANT CHANGE UP (ref 0.5–1)
B-GLOBULIN SERPL ELPH-MCNC: 0.9 G/DL — SIGNIFICANT CHANGE UP (ref 0.5–1)
BACTERIA # UR AUTO: ABNORMAL /HPF
BACTERIA # UR AUTO: ABNORMAL /HPF
BACTERIA # UR AUTO: NEGATIVE /HPF — SIGNIFICANT CHANGE UP
BACTERIA # UR AUTO: PRESENT /HPF
BACTERIA # UR AUTO: SIGNIFICANT CHANGE UP /HPF
BARBITURATES UR SCN-MCNC: NEGATIVE — SIGNIFICANT CHANGE UP
BARBITURATES UR SCN-MCNC: NEGATIVE — SIGNIFICANT CHANGE UP
BASE EXCESS BLDA CALC-SCNC: -0.2 MMOL/L — SIGNIFICANT CHANGE UP (ref -2–3)
BASE EXCESS BLDA CALC-SCNC: -0.3 MMOL/L — SIGNIFICANT CHANGE UP (ref -2–3)
BASE EXCESS BLDA CALC-SCNC: -0.8 MMOL/L — SIGNIFICANT CHANGE UP (ref -2–3)
BASE EXCESS BLDA CALC-SCNC: -2.1 MMOL/L — LOW (ref -2–3)
BASE EXCESS BLDA CALC-SCNC: -2.9 MMOL/L — LOW (ref -2–3)
BASE EXCESS BLDA CALC-SCNC: -3.1 MMOL/L — LOW (ref -2–3)
BASE EXCESS BLDA CALC-SCNC: -3.2 MMOL/L — LOW (ref -2–3)
BASE EXCESS BLDA CALC-SCNC: -3.6 MMOL/L — LOW (ref -2–3)
BASE EXCESS BLDA CALC-SCNC: -4 MMOL/L — LOW (ref -2–3)
BASE EXCESS BLDA CALC-SCNC: -4.4 MMOL/L — LOW (ref -2–3)
BASE EXCESS BLDA CALC-SCNC: -4.7 MMOL/L — LOW (ref -2–3)
BASE EXCESS BLDA CALC-SCNC: -5.5 MMOL/L — LOW (ref -2–3)
BASE EXCESS BLDA CALC-SCNC: -5.9 MMOL/L — LOW (ref -2–3)
BASE EXCESS BLDA CALC-SCNC: 0.3 MMOL/L — SIGNIFICANT CHANGE UP (ref -2–3)
BASE EXCESS BLDA CALC-SCNC: 0.5 MMOL/L — SIGNIFICANT CHANGE UP (ref -2–3)
BASE EXCESS BLDA CALC-SCNC: 0.6 MMOL/L — SIGNIFICANT CHANGE UP (ref -2–3)
BASE EXCESS BLDA CALC-SCNC: 0.8 MMOL/L — SIGNIFICANT CHANGE UP (ref -2–3)
BASE EXCESS BLDA CALC-SCNC: 1.3 MMOL/L — SIGNIFICANT CHANGE UP (ref -2–3)
BASE EXCESS BLDA CALC-SCNC: 2.9 MMOL/L — SIGNIFICANT CHANGE UP (ref -2–3)
BASE EXCESS BLDV CALC-SCNC: -3.7 MMOL/L — LOW (ref -2–3)
BASOPHILS # BLD AUTO: 0 K/UL — SIGNIFICANT CHANGE UP (ref 0–0.2)
BASOPHILS # BLD AUTO: 0.03 K/UL — SIGNIFICANT CHANGE UP (ref 0–0.2)
BASOPHILS # BLD AUTO: 0.03 K/UL — SIGNIFICANT CHANGE UP (ref 0–0.2)
BASOPHILS # BLD AUTO: 0.04 K/UL — SIGNIFICANT CHANGE UP (ref 0–0.2)
BASOPHILS # BLD AUTO: 0.05 K/UL — SIGNIFICANT CHANGE UP (ref 0–0.2)
BASOPHILS # BLD AUTO: 0.06 K/UL — SIGNIFICANT CHANGE UP (ref 0–0.2)
BASOPHILS # BLD AUTO: 0.07 K/UL — SIGNIFICANT CHANGE UP (ref 0–0.2)
BASOPHILS # BLD AUTO: 0.09 K/UL — SIGNIFICANT CHANGE UP (ref 0–0.2)
BASOPHILS # BLD AUTO: 0.09 K/UL — SIGNIFICANT CHANGE UP (ref 0–0.2)
BASOPHILS # BLD AUTO: 0.1 K/UL — SIGNIFICANT CHANGE UP (ref 0–0.2)
BASOPHILS # BLD AUTO: 0.11 K/UL — SIGNIFICANT CHANGE UP (ref 0–0.2)
BASOPHILS # BLD AUTO: 0.18 K/UL — SIGNIFICANT CHANGE UP (ref 0–0.2)
BASOPHILS # BLD AUTO: 0.24 K/UL — HIGH (ref 0–0.2)
BASOPHILS NFR BLD AUTO: 0 % — SIGNIFICANT CHANGE UP (ref 0–2)
BASOPHILS NFR BLD AUTO: 0.3 % — SIGNIFICANT CHANGE UP (ref 0–2)
BASOPHILS NFR BLD AUTO: 0.4 % — SIGNIFICANT CHANGE UP (ref 0–2)
BASOPHILS NFR BLD AUTO: 0.4 % — SIGNIFICANT CHANGE UP (ref 0–2)
BASOPHILS NFR BLD AUTO: 0.5 % — SIGNIFICANT CHANGE UP (ref 0–2)
BASOPHILS NFR BLD AUTO: 0.6 % — SIGNIFICANT CHANGE UP (ref 0–2)
BASOPHILS NFR BLD AUTO: 0.7 % — SIGNIFICANT CHANGE UP (ref 0–2)
BASOPHILS NFR BLD AUTO: 0.8 % — SIGNIFICANT CHANGE UP (ref 0–2)
BASOPHILS NFR BLD AUTO: 0.9 % — SIGNIFICANT CHANGE UP (ref 0–2)
BASOPHILS NFR BLD AUTO: 1 % — SIGNIFICANT CHANGE UP (ref 0–2)
BASOPHILS NFR BLD AUTO: 1 % — SIGNIFICANT CHANGE UP (ref 0–2)
BASOPHILS NFR BLD AUTO: 1.2 % — SIGNIFICANT CHANGE UP (ref 0–2)
BASOPHILS NFR BLD AUTO: 1.2 % — SIGNIFICANT CHANGE UP (ref 0–2)
BASOPHILS NFR BLD AUTO: 1.8 % — SIGNIFICANT CHANGE UP (ref 0–2)
BENZODIAZ UR-MCNC: NEGATIVE — SIGNIFICANT CHANGE UP
BENZODIAZ UR-MCNC: NEGATIVE — SIGNIFICANT CHANGE UP
BILIRUB DIRECT SERPL-MCNC: 1.7 MG/DL — HIGH (ref 0–0.3)
BILIRUB DIRECT SERPL-MCNC: 1.9 MG/DL — HIGH (ref 0–0.3)
BILIRUB DIRECT SERPL-MCNC: 2 MG/DL — HIGH (ref 0–0.3)
BILIRUB DIRECT SERPL-MCNC: 2.1 MG/DL — HIGH (ref 0–0.3)
BILIRUB DIRECT SERPL-MCNC: 2.1 MG/DL — HIGH (ref 0–0.3)
BILIRUB DIRECT SERPL-MCNC: 2.2 MG/DL — HIGH (ref 0–0.3)
BILIRUB DIRECT SERPL-MCNC: 2.3 MG/DL — HIGH (ref 0–0.3)
BILIRUB DIRECT SERPL-MCNC: 2.4 MG/DL — HIGH (ref 0–0.3)
BILIRUB DIRECT SERPL-MCNC: 2.5 MG/DL — HIGH (ref 0–0.3)
BILIRUB DIRECT SERPL-MCNC: 2.6 MG/DL — HIGH (ref 0–0.3)
BILIRUB DIRECT SERPL-MCNC: 2.8 MG/DL — HIGH (ref 0–0.3)
BILIRUB DIRECT SERPL-MCNC: 2.9 MG/DL — HIGH (ref 0–0.3)
BILIRUB DIRECT SERPL-MCNC: 2.9 MG/DL — HIGH (ref 0–0.3)
BILIRUB DIRECT SERPL-MCNC: 3 MG/DL — HIGH (ref 0–0.3)
BILIRUB DIRECT SERPL-MCNC: 3.5 MG/DL — HIGH (ref 0–0.3)
BILIRUB DIRECT SERPL-MCNC: 3.6 MG/DL — HIGH (ref 0–0.3)
BILIRUB DIRECT SERPL-MCNC: 3.7 MG/DL — HIGH (ref 0–0.3)
BILIRUB DIRECT SERPL-MCNC: 3.7 MG/DL — HIGH (ref 0–0.3)
BILIRUB DIRECT SERPL-MCNC: 3.8 MG/DL — HIGH (ref 0–0.3)
BILIRUB DIRECT SERPL-MCNC: 3.9 MG/DL — HIGH (ref 0–0.3)
BILIRUB DIRECT SERPL-MCNC: 4 MG/DL — HIGH (ref 0–0.3)
BILIRUB DIRECT SERPL-MCNC: 4.1 MG/DL — HIGH (ref 0–0.3)
BILIRUB DIRECT SERPL-MCNC: 4.2 MG/DL — HIGH (ref 0–0.3)
BILIRUB DIRECT SERPL-MCNC: 4.3 MG/DL — HIGH (ref 0–0.3)
BILIRUB DIRECT SERPL-MCNC: 4.4 MG/DL — HIGH (ref 0–0.3)
BILIRUB DIRECT SERPL-MCNC: 4.4 MG/DL — HIGH (ref 0–0.3)
BILIRUB DIRECT SERPL-MCNC: 4.5 MG/DL — HIGH (ref 0–0.3)
BILIRUB DIRECT SERPL-MCNC: 4.7 MG/DL — HIGH (ref 0–0.3)
BILIRUB DIRECT SERPL-MCNC: 4.8 MG/DL — HIGH (ref 0–0.3)
BILIRUB DIRECT SERPL-MCNC: 4.9 MG/DL — HIGH (ref 0–0.3)
BILIRUB DIRECT SERPL-MCNC: 4.9 MG/DL — HIGH (ref 0–0.3)
BILIRUB DIRECT SERPL-MCNC: 5 MG/DL — HIGH (ref 0–0.3)
BILIRUB DIRECT SERPL-MCNC: 5.1 MG/DL — HIGH (ref 0–0.3)
BILIRUB DIRECT SERPL-MCNC: 5.2 MG/DL — HIGH (ref 0–0.3)
BILIRUB DIRECT SERPL-MCNC: 5.4 MG/DL — HIGH (ref 0–0.3)
BILIRUB DIRECT SERPL-MCNC: 5.6 MG/DL — HIGH (ref 0–0.3)
BILIRUB DIRECT SERPL-MCNC: 5.6 MG/DL — HIGH (ref 0–0.3)
BILIRUB DIRECT SERPL-MCNC: 5.8 MG/DL — HIGH (ref 0–0.3)
BILIRUB DIRECT SERPL-MCNC: 5.8 MG/DL — HIGH (ref 0–0.3)
BILIRUB DIRECT SERPL-MCNC: 6 MG/DL — HIGH (ref 0–0.3)
BILIRUB DIRECT SERPL-MCNC: 6 MG/DL — HIGH (ref 0–0.3)
BILIRUB DIRECT SERPL-MCNC: 6.6 MG/DL — HIGH (ref 0–0.3)
BILIRUB DIRECT SERPL-MCNC: 6.6 MG/DL — HIGH (ref 0–0.3)
BILIRUB DIRECT SERPL-MCNC: 7.1 MG/DL — HIGH (ref 0–0.3)
BILIRUB DIRECT SERPL-MCNC: 7.1 MG/DL — HIGH (ref 0–0.3)
BILIRUB INDIRECT FLD-MCNC: 0.4 MG/DL — SIGNIFICANT CHANGE UP (ref 0.2–1)
BILIRUB INDIRECT FLD-MCNC: 0.5 MG/DL — SIGNIFICANT CHANGE UP (ref 0.2–1)
BILIRUB INDIRECT FLD-MCNC: 0.5 MG/DL — SIGNIFICANT CHANGE UP (ref 0.2–1)
BILIRUB INDIRECT FLD-MCNC: 0.8 MG/DL — SIGNIFICANT CHANGE UP (ref 0.2–1)
BILIRUB INDIRECT FLD-MCNC: 0.9 MG/DL — SIGNIFICANT CHANGE UP (ref 0.2–1)
BILIRUB INDIRECT FLD-MCNC: 1 MG/DL — SIGNIFICANT CHANGE UP (ref 0.2–1)
BILIRUB INDIRECT FLD-MCNC: 1.1 MG/DL — HIGH (ref 0.2–1)
BILIRUB INDIRECT FLD-MCNC: 1.2 MG/DL — HIGH (ref 0.2–1)
BILIRUB INDIRECT FLD-MCNC: 1.3 MG/DL — HIGH (ref 0.2–1)
BILIRUB INDIRECT FLD-MCNC: 1.4 MG/DL — HIGH (ref 0.2–1)
BILIRUB INDIRECT FLD-MCNC: 1.5 MG/DL — HIGH (ref 0.2–1)
BILIRUB INDIRECT FLD-MCNC: 1.6 MG/DL — HIGH (ref 0.2–1)
BILIRUB INDIRECT FLD-MCNC: 1.7 MG/DL — HIGH (ref 0.2–1)
BILIRUB INDIRECT FLD-MCNC: 1.8 MG/DL — HIGH (ref 0.2–1)
BILIRUB INDIRECT FLD-MCNC: 1.9 MG/DL — HIGH (ref 0.2–1)
BILIRUB INDIRECT FLD-MCNC: 2 MG/DL — HIGH (ref 0.2–1)
BILIRUB INDIRECT FLD-MCNC: 2.1 MG/DL — HIGH (ref 0.2–1)
BILIRUB INDIRECT FLD-MCNC: 2.2 MG/DL — HIGH (ref 0.2–1)
BILIRUB INDIRECT FLD-MCNC: 2.3 MG/DL — HIGH (ref 0.2–1)
BILIRUB INDIRECT FLD-MCNC: 2.4 MG/DL — HIGH (ref 0.2–1)
BILIRUB INDIRECT FLD-MCNC: 2.5 MG/DL — HIGH (ref 0.2–1)
BILIRUB INDIRECT FLD-MCNC: 2.6 MG/DL — HIGH (ref 0.2–1)
BILIRUB INDIRECT FLD-MCNC: 2.7 MG/DL — HIGH (ref 0.2–1)
BILIRUB INDIRECT FLD-MCNC: 3.2 MG/DL — HIGH (ref 0.2–1)
BILIRUB INDIRECT FLD-MCNC: 3.2 MG/DL — HIGH (ref 0.2–1)
BILIRUB SERPL-MCNC: 0.8 MG/DL — SIGNIFICANT CHANGE UP (ref 0.2–1.2)
BILIRUB SERPL-MCNC: 10.3 MG/DL — HIGH (ref 0.2–1.2)
BILIRUB SERPL-MCNC: 10.3 MG/DL — HIGH (ref 0.2–1.2)
BILIRUB SERPL-MCNC: 2.4 MG/DL — HIGH (ref 0.2–1.2)
BILIRUB SERPL-MCNC: 2.5 MG/DL — HIGH (ref 0.2–1.2)
BILIRUB SERPL-MCNC: 2.6 MG/DL — HIGH (ref 0.2–1.2)
BILIRUB SERPL-MCNC: 2.6 MG/DL — HIGH (ref 0.2–1.2)
BILIRUB SERPL-MCNC: 2.8 MG/DL — HIGH (ref 0.2–1.2)
BILIRUB SERPL-MCNC: 2.9 MG/DL — HIGH (ref 0.2–1.2)
BILIRUB SERPL-MCNC: 3 MG/DL — HIGH (ref 0.2–1.2)
BILIRUB SERPL-MCNC: 3.1 MG/DL — HIGH (ref 0.2–1.2)
BILIRUB SERPL-MCNC: 3.2 MG/DL — HIGH (ref 0.2–1.2)
BILIRUB SERPL-MCNC: 3.3 MG/DL — HIGH (ref 0.2–1.2)
BILIRUB SERPL-MCNC: 3.4 MG/DL — HIGH (ref 0.2–1.2)
BILIRUB SERPL-MCNC: 3.5 MG/DL — HIGH (ref 0.2–1.2)
BILIRUB SERPL-MCNC: 3.6 MG/DL — HIGH (ref 0.2–1.2)
BILIRUB SERPL-MCNC: 3.7 MG/DL — HIGH (ref 0.2–1.2)
BILIRUB SERPL-MCNC: 3.8 MG/DL — HIGH (ref 0.2–1.2)
BILIRUB SERPL-MCNC: 3.9 MG/DL — HIGH (ref 0.2–1.2)
BILIRUB SERPL-MCNC: 4 MG/DL — HIGH (ref 0.2–1.2)
BILIRUB SERPL-MCNC: 4.2 MG/DL — HIGH (ref 0.2–1.2)
BILIRUB SERPL-MCNC: 4.3 MG/DL — HIGH (ref 0.2–1.2)
BILIRUB SERPL-MCNC: 4.4 MG/DL — HIGH (ref 0.2–1.2)
BILIRUB SERPL-MCNC: 4.6 MG/DL — HIGH (ref 0.2–1.2)
BILIRUB SERPL-MCNC: 4.6 MG/DL — HIGH (ref 0.2–1.2)
BILIRUB SERPL-MCNC: 4.7 MG/DL — HIGH (ref 0.2–1.2)
BILIRUB SERPL-MCNC: 4.9 MG/DL — HIGH (ref 0.2–1.2)
BILIRUB SERPL-MCNC: 5.1 MG/DL — HIGH (ref 0.2–1.2)
BILIRUB SERPL-MCNC: 5.2 MG/DL — HIGH (ref 0.2–1.2)
BILIRUB SERPL-MCNC: 5.2 MG/DL — HIGH (ref 0.2–1.2)
BILIRUB SERPL-MCNC: 5.3 MG/DL — HIGH (ref 0.2–1.2)
BILIRUB SERPL-MCNC: 5.5 MG/DL — HIGH (ref 0.2–1.2)
BILIRUB SERPL-MCNC: 5.7 MG/DL — HIGH (ref 0.2–1.2)
BILIRUB SERPL-MCNC: 5.8 MG/DL — HIGH (ref 0.2–1.2)
BILIRUB SERPL-MCNC: 5.9 MG/DL — HIGH (ref 0.2–1.2)
BILIRUB SERPL-MCNC: 6 MG/DL — HIGH (ref 0.2–1.2)
BILIRUB SERPL-MCNC: 6.1 MG/DL — HIGH (ref 0.2–1.2)
BILIRUB SERPL-MCNC: 6.2 MG/DL — HIGH (ref 0.2–1.2)
BILIRUB SERPL-MCNC: 6.3 MG/DL — HIGH (ref 0.2–1.2)
BILIRUB SERPL-MCNC: 6.4 MG/DL — HIGH (ref 0.2–1.2)
BILIRUB SERPL-MCNC: 6.4 MG/DL — HIGH (ref 0.2–1.2)
BILIRUB SERPL-MCNC: 6.5 MG/DL — HIGH (ref 0.2–1.2)
BILIRUB SERPL-MCNC: 6.6 MG/DL — HIGH (ref 0.2–1.2)
BILIRUB SERPL-MCNC: 6.9 MG/DL — HIGH (ref 0.2–1.2)
BILIRUB SERPL-MCNC: 7 MG/DL — HIGH (ref 0.2–1.2)
BILIRUB SERPL-MCNC: 7 MG/DL — HIGH (ref 0.2–1.2)
BILIRUB SERPL-MCNC: 7.1 MG/DL — HIGH (ref 0.2–1.2)
BILIRUB SERPL-MCNC: 7.1 MG/DL — HIGH (ref 0.2–1.2)
BILIRUB SERPL-MCNC: 7.4 MG/DL — HIGH (ref 0.2–1.2)
BILIRUB SERPL-MCNC: 7.5 MG/DL — HIGH (ref 0.2–1.2)
BILIRUB SERPL-MCNC: 7.5 MG/DL — HIGH (ref 0.2–1.2)
BILIRUB SERPL-MCNC: 7.6 MG/DL — HIGH (ref 0.2–1.2)
BILIRUB SERPL-MCNC: 7.7 MG/DL — HIGH (ref 0.2–1.2)
BILIRUB SERPL-MCNC: 7.7 MG/DL — HIGH (ref 0.2–1.2)
BILIRUB SERPL-MCNC: 7.8 MG/DL — HIGH (ref 0.2–1.2)
BILIRUB SERPL-MCNC: 7.8 MG/DL — HIGH (ref 0.2–1.2)
BILIRUB SERPL-MCNC: 7.9 MG/DL — HIGH (ref 0.2–1.2)
BILIRUB SERPL-MCNC: 7.9 MG/DL — HIGH (ref 0.2–1.2)
BILIRUB SERPL-MCNC: 8 MG/DL — HIGH (ref 0.2–1.2)
BILIRUB SERPL-MCNC: 8 MG/DL — HIGH (ref 0.2–1.2)
BILIRUB SERPL-MCNC: 8.5 MG/DL — HIGH (ref 0.2–1.2)
BILIRUB SERPL-MCNC: 8.5 MG/DL — HIGH (ref 0.2–1.2)
BILIRUB SERPL-MCNC: 8.8 MG/DL — HIGH (ref 0.2–1.2)
BILIRUB UR-MCNC: ABNORMAL
BILIRUB UR-MCNC: NEGATIVE — SIGNIFICANT CHANGE UP
BLANDM BLD POS QL PROBE: SIGNIFICANT CHANGE UP
BLANDM BLD POS QL PROBE: SIGNIFICANT CHANGE UP
BLD GP AB SCN SERPL QL: NEGATIVE — SIGNIFICANT CHANGE UP
BUN SERPL-MCNC: 10 MG/DL — SIGNIFICANT CHANGE UP (ref 7–23)
BUN SERPL-MCNC: 12 MG/DL — SIGNIFICANT CHANGE UP (ref 7–23)
BUN SERPL-MCNC: 12 MG/DL — SIGNIFICANT CHANGE UP (ref 7–23)
BUN SERPL-MCNC: 13 MG/DL — SIGNIFICANT CHANGE UP (ref 7–23)
BUN SERPL-MCNC: 13 MG/DL — SIGNIFICANT CHANGE UP (ref 7–23)
BUN SERPL-MCNC: 15 MG/DL — SIGNIFICANT CHANGE UP (ref 7–23)
BUN SERPL-MCNC: 15 MG/DL — SIGNIFICANT CHANGE UP (ref 7–23)
BUN SERPL-MCNC: 16 MG/DL — SIGNIFICANT CHANGE UP (ref 7–23)
BUN SERPL-MCNC: 16 MG/DL — SIGNIFICANT CHANGE UP (ref 7–23)
BUN SERPL-MCNC: 17 MG/DL — SIGNIFICANT CHANGE UP (ref 7–23)
BUN SERPL-MCNC: 18 MG/DL — SIGNIFICANT CHANGE UP (ref 7–23)
BUN SERPL-MCNC: 19 MG/DL — SIGNIFICANT CHANGE UP (ref 7–23)
BUN SERPL-MCNC: 20 MG/DL — SIGNIFICANT CHANGE UP (ref 7–23)
BUN SERPL-MCNC: 21 MG/DL — SIGNIFICANT CHANGE UP (ref 7–23)
BUN SERPL-MCNC: 22 MG/DL — SIGNIFICANT CHANGE UP (ref 7–23)
BUN SERPL-MCNC: 23 MG/DL — SIGNIFICANT CHANGE UP (ref 7–23)
BUN SERPL-MCNC: 24 MG/DL — HIGH (ref 7–23)
BUN SERPL-MCNC: 25 MG/DL — HIGH (ref 7–23)
BUN SERPL-MCNC: 26 MG/DL — HIGH (ref 7–23)
BUN SERPL-MCNC: 27 MG/DL — HIGH (ref 7–23)
BUN SERPL-MCNC: 28 MG/DL — HIGH (ref 7–23)
BUN SERPL-MCNC: 29 MG/DL — HIGH (ref 7–23)
BUN SERPL-MCNC: 30 MG/DL — HIGH (ref 7–23)
BUN SERPL-MCNC: 31 MG/DL — HIGH (ref 7–23)
BUN SERPL-MCNC: 32 MG/DL — HIGH (ref 7–23)
BUN SERPL-MCNC: 33 MG/DL — HIGH (ref 7–23)
BUN SERPL-MCNC: 34 MG/DL — HIGH (ref 7–23)
BUN SERPL-MCNC: 35 MG/DL — HIGH (ref 7–23)
BUN SERPL-MCNC: 36 MG/DL — HIGH (ref 7–23)
BUN SERPL-MCNC: 37 MG/DL — HIGH (ref 7–23)
BUN SERPL-MCNC: 37 MG/DL — HIGH (ref 7–23)
BUN SERPL-MCNC: 38 MG/DL — HIGH (ref 7–23)
BUN SERPL-MCNC: 39 MG/DL — HIGH (ref 7–23)
BUN SERPL-MCNC: 40 MG/DL — HIGH (ref 7–23)
BUN SERPL-MCNC: 41 MG/DL — HIGH (ref 7–23)
BUN SERPL-MCNC: 42 MG/DL — HIGH (ref 7–23)
BUN SERPL-MCNC: 43 MG/DL — HIGH (ref 7–23)
BUN SERPL-MCNC: 44 MG/DL — HIGH (ref 7–23)
BUN SERPL-MCNC: 45 MG/DL — HIGH (ref 7–23)
BUN SERPL-MCNC: 46 MG/DL — HIGH (ref 7–23)
BUN SERPL-MCNC: 47 MG/DL — HIGH (ref 7–23)
BUN SERPL-MCNC: 47 MG/DL — HIGH (ref 7–23)
BUN SERPL-MCNC: 48 MG/DL — HIGH (ref 7–23)
BUN SERPL-MCNC: 50 MG/DL — HIGH (ref 7–23)
BUN SERPL-MCNC: 51 MG/DL — HIGH (ref 7–23)
BUN SERPL-MCNC: 51 MG/DL — HIGH (ref 7–23)
BUN SERPL-MCNC: 53 MG/DL — HIGH (ref 7–23)
BUN SERPL-MCNC: 53 MG/DL — HIGH (ref 7–23)
BUN SERPL-MCNC: 58 MG/DL — HIGH (ref 7–23)
BUN SERPL-MCNC: 58 MG/DL — HIGH (ref 7–23)
BUN SERPL-MCNC: 8 MG/DL — SIGNIFICANT CHANGE UP (ref 7–23)
BUN SERPL-MCNC: 8 MG/DL — SIGNIFICANT CHANGE UP (ref 7–23)
BUN SERPL-MCNC: 9 MG/DL — SIGNIFICANT CHANGE UP (ref 7–23)
BUN SERPL-MCNC: SIGNIFICANT CHANGE UP (ref 7–23)
BUN SERPL-MCNC: SIGNIFICANT CHANGE UP (ref 7–23)
BURR CELLS BLD QL SMEAR: PRESENT — SIGNIFICANT CHANGE UP
C AURIS DNA BLD POS QL NAA+PROBE: SIGNIFICANT CHANGE UP
C AURIS DNA BLD POS QL NAA+PROBE: SIGNIFICANT CHANGE UP
C NEOFORM DNA BLD POS QL NAA+PROBE: SIGNIFICANT CHANGE UP
C NEOFORM DNA BLD POS QL NAA+PROBE: SIGNIFICANT CHANGE UP
C-ANCA SER-ACNC: NEGATIVE — SIGNIFICANT CHANGE UP
C-ANCA SER-ACNC: NEGATIVE — SIGNIFICANT CHANGE UP
C3 SERPL-MCNC: 155 MG/DL — SIGNIFICANT CHANGE UP (ref 81–157)
C3 SERPL-MCNC: 155 MG/DL — SIGNIFICANT CHANGE UP (ref 81–157)
C4 SERPL-MCNC: 22 MG/DL — SIGNIFICANT CHANGE UP (ref 13–39)
C4 SERPL-MCNC: 22 MG/DL — SIGNIFICANT CHANGE UP (ref 13–39)
CA-I BLDA-SCNC: 1 MMOL/L — LOW (ref 1.15–1.33)
CA-I BLDA-SCNC: 1.06 MMOL/L — LOW (ref 1.15–1.33)
CA-I BLDA-SCNC: 1.08 MMOL/L — LOW (ref 1.15–1.33)
CA-I BLDA-SCNC: 1.11 MMOL/L — LOW (ref 1.15–1.33)
CA-I BLDA-SCNC: 1.12 MMOL/L — LOW (ref 1.15–1.33)
CA-I BLDA-SCNC: 1.21 MMOL/L — SIGNIFICANT CHANGE UP (ref 1.15–1.33)
CA-I SERPL-SCNC: 1.15 MMOL/L — SIGNIFICANT CHANGE UP (ref 1.15–1.33)
CALCIUM SERPL-MCNC: 6.6 MG/DL — LOW (ref 8.4–10.5)
CALCIUM SERPL-MCNC: 6.8 MG/DL — LOW (ref 8.4–10.5)
CALCIUM SERPL-MCNC: 6.9 MG/DL — LOW (ref 8.4–10.5)
CALCIUM SERPL-MCNC: 7 MG/DL — LOW (ref 8.4–10.5)
CALCIUM SERPL-MCNC: 7.1 MG/DL — LOW (ref 8.4–10.5)
CALCIUM SERPL-MCNC: 7.2 MG/DL — LOW (ref 8.4–10.5)
CALCIUM SERPL-MCNC: 7.3 MG/DL — LOW (ref 8.4–10.5)
CALCIUM SERPL-MCNC: 7.4 MG/DL — LOW (ref 8.4–10.5)
CALCIUM SERPL-MCNC: 7.5 MG/DL — LOW (ref 8.4–10.5)
CALCIUM SERPL-MCNC: 7.6 MG/DL — LOW (ref 8.4–10.5)
CALCIUM SERPL-MCNC: 7.7 MG/DL — LOW (ref 8.4–10.5)
CALCIUM SERPL-MCNC: 7.8 MG/DL — LOW (ref 8.4–10.5)
CALCIUM SERPL-MCNC: 7.9 MG/DL — LOW (ref 8.4–10.5)
CALCIUM SERPL-MCNC: 8 MG/DL — LOW (ref 8.4–10.5)
CALCIUM SERPL-MCNC: 8.1 MG/DL — LOW (ref 8.4–10.5)
CALCIUM SERPL-MCNC: 8.2 MG/DL — LOW (ref 8.4–10.5)
CALCIUM SERPL-MCNC: 8.3 MG/DL — LOW (ref 8.4–10.5)
CALCIUM SERPL-MCNC: 8.4 MG/DL — SIGNIFICANT CHANGE UP (ref 8.4–10.5)
CALCIUM SERPL-MCNC: 8.5 MG/DL — SIGNIFICANT CHANGE UP (ref 8.4–10.5)
CALCIUM SERPL-MCNC: 8.6 MG/DL — SIGNIFICANT CHANGE UP (ref 8.4–10.5)
CALCIUM SERPL-MCNC: 8.7 MG/DL — SIGNIFICANT CHANGE UP (ref 8.4–10.5)
CALCIUM SERPL-MCNC: 8.8 MG/DL — SIGNIFICANT CHANGE UP (ref 8.4–10.5)
CALCIUM SERPL-MCNC: 8.9 MG/DL — SIGNIFICANT CHANGE UP (ref 8.4–10.5)
CALCIUM SERPL-MCNC: 9 MG/DL — SIGNIFICANT CHANGE UP (ref 8.4–10.5)
CALCIUM SERPL-MCNC: 9.1 MG/DL — SIGNIFICANT CHANGE UP (ref 8.4–10.5)
CALCIUM SERPL-MCNC: 9.2 MG/DL — SIGNIFICANT CHANGE UP (ref 8.4–10.5)
CALCIUM SERPL-MCNC: 9.2 MG/DL — SIGNIFICANT CHANGE UP (ref 8.4–10.5)
CALCIUM SERPL-MCNC: 9.3 MG/DL — SIGNIFICANT CHANGE UP (ref 8.4–10.5)
CALCIUM SERPL-MCNC: SIGNIFICANT CHANGE UP (ref 8.4–10.5)
CAST: 0 /LPF — SIGNIFICANT CHANGE UP (ref 0–4)
CAST: 0 /LPF — SIGNIFICANT CHANGE UP (ref 0–4)
CAST: 1 /LPF — SIGNIFICANT CHANGE UP (ref 0–4)
CAST: 2 /LPF — SIGNIFICANT CHANGE UP (ref 0–4)
CAST: 2 /LPF — SIGNIFICANT CHANGE UP (ref 0–4)
CAST: 3 /LPF — SIGNIFICANT CHANGE UP (ref 0–4)
CAST: 3 /LPF — SIGNIFICANT CHANGE UP (ref 0–4)
CAST: 8 /LPF — HIGH (ref 0–4)
CAST: 8 /LPF — HIGH (ref 0–4)
CHLORIDE SERPL-SCNC: 100 MMOL/L — SIGNIFICANT CHANGE UP (ref 96–108)
CHLORIDE SERPL-SCNC: 101 MMOL/L — SIGNIFICANT CHANGE UP (ref 96–108)
CHLORIDE SERPL-SCNC: 102 MMOL/L — SIGNIFICANT CHANGE UP (ref 96–108)
CHLORIDE SERPL-SCNC: 103 MMOL/L — SIGNIFICANT CHANGE UP (ref 96–108)
CHLORIDE SERPL-SCNC: 104 MMOL/L — SIGNIFICANT CHANGE UP (ref 96–108)
CHLORIDE SERPL-SCNC: 105 MMOL/L — SIGNIFICANT CHANGE UP (ref 96–108)
CHLORIDE SERPL-SCNC: 106 MMOL/L — SIGNIFICANT CHANGE UP (ref 96–108)
CHLORIDE SERPL-SCNC: 107 MMOL/L — SIGNIFICANT CHANGE UP (ref 96–108)
CHLORIDE SERPL-SCNC: 108 MMOL/L — SIGNIFICANT CHANGE UP (ref 96–108)
CHLORIDE SERPL-SCNC: 109 MMOL/L — HIGH (ref 96–108)
CHLORIDE SERPL-SCNC: 110 MMOL/L — HIGH (ref 96–108)
CHLORIDE SERPL-SCNC: 112 MMOL/L — HIGH (ref 96–108)
CHLORIDE SERPL-SCNC: 112 MMOL/L — HIGH (ref 96–108)
CHLORIDE SERPL-SCNC: 113 MMOL/L — HIGH (ref 96–108)
CHLORIDE SERPL-SCNC: 113 MMOL/L — HIGH (ref 96–108)
CHLORIDE SERPL-SCNC: 92 MMOL/L — LOW (ref 96–108)
CHLORIDE SERPL-SCNC: 95 MMOL/L — LOW (ref 96–108)
CHLORIDE SERPL-SCNC: 95 MMOL/L — LOW (ref 96–108)
CHLORIDE SERPL-SCNC: 96 MMOL/L — SIGNIFICANT CHANGE UP (ref 96–108)
CHLORIDE SERPL-SCNC: 97 MMOL/L — SIGNIFICANT CHANGE UP (ref 96–108)
CHLORIDE SERPL-SCNC: 98 MMOL/L — SIGNIFICANT CHANGE UP (ref 96–108)
CHLORIDE SERPL-SCNC: 99 MMOL/L — SIGNIFICANT CHANGE UP (ref 96–108)
CHLORIDE SERPL-SCNC: SIGNIFICANT CHANGE UP (ref 96–108)
CHOLEST SERPL-MCNC: 128 MG/DL — SIGNIFICANT CHANGE UP
CHOLEST SERPL-MCNC: 143 MG/DL — SIGNIFICANT CHANGE UP
CHOLEST SERPL-MCNC: 159 MG/DL — SIGNIFICANT CHANGE UP
CHOLEST SERPL-MCNC: 163 MG/DL — SIGNIFICANT CHANGE UP
CHOLEST SERPL-MCNC: 163 MG/DL — SIGNIFICANT CHANGE UP
CK SERPL-CCNC: 181 U/L — SIGNIFICANT CHANGE UP (ref 30–200)
CK SERPL-CCNC: 249 U/L — HIGH (ref 30–200)
CK SERPL-CCNC: 27 U/L — LOW (ref 30–200)
CK SERPL-CCNC: 27 U/L — LOW (ref 30–200)
CK SERPL-CCNC: 290 U/L — HIGH (ref 30–200)
CK SERPL-CCNC: 36 U/L — SIGNIFICANT CHANGE UP (ref 30–200)
CMV DNA # UR NAA DL=200: SIGNIFICANT CHANGE UP IU/ML
CMV DNA # UR NAA DL=200: SIGNIFICANT CHANGE UP IU/ML
CMV DNA CSF QL NAA+PROBE: SIGNIFICANT CHANGE UP IU/ML
CMV DNA CSF QL NAA+PROBE: SIGNIFICANT CHANGE UP IU/ML
CMV DNA SPEC NAA+PROBE-LOG#: SIGNIFICANT CHANGE UP LOG10IU/ML
CMV DNA SPEC NAA+PROBE-LOG#: SIGNIFICANT CHANGE UP LOG10IU/ML
CO2 BLDA-SCNC: 21 MMOL/L — SIGNIFICANT CHANGE UP (ref 19–24)
CO2 BLDA-SCNC: 21 MMOL/L — SIGNIFICANT CHANGE UP (ref 19–24)
CO2 BLDA-SCNC: 22 MMOL/L — SIGNIFICANT CHANGE UP (ref 19–24)
CO2 BLDA-SCNC: 23 MMOL/L — SIGNIFICANT CHANGE UP (ref 19–24)
CO2 BLDA-SCNC: 23 MMOL/L — SIGNIFICANT CHANGE UP (ref 19–24)
CO2 BLDA-SCNC: 24 MMOL/L — SIGNIFICANT CHANGE UP (ref 19–24)
CO2 BLDA-SCNC: 26 MMOL/L — HIGH (ref 19–24)
CO2 BLDA-SCNC: 27 MMOL/L — HIGH (ref 19–24)
CO2 BLDA-SCNC: 28 MMOL/L — HIGH (ref 19–24)
CO2 BLDA-SCNC: 29 MMOL/L — HIGH (ref 19–24)
CO2 BLDV-SCNC: 24.1 MMOL/L — SIGNIFICANT CHANGE UP (ref 22–26)
CO2 SERPL-SCNC: 13 MMOL/L — LOW (ref 22–31)
CO2 SERPL-SCNC: 13 MMOL/L — LOW (ref 22–31)
CO2 SERPL-SCNC: 14 MMOL/L — LOW (ref 22–31)
CO2 SERPL-SCNC: 14 MMOL/L — LOW (ref 22–31)
CO2 SERPL-SCNC: 16 MMOL/L — LOW (ref 22–31)
CO2 SERPL-SCNC: 17 MMOL/L — LOW (ref 22–31)
CO2 SERPL-SCNC: 18 MMOL/L — LOW (ref 22–31)
CO2 SERPL-SCNC: 19 MMOL/L — LOW (ref 22–31)
CO2 SERPL-SCNC: 20 MMOL/L — LOW (ref 22–31)
CO2 SERPL-SCNC: 21 MMOL/L — LOW (ref 22–31)
CO2 SERPL-SCNC: 22 MMOL/L — SIGNIFICANT CHANGE UP (ref 22–31)
CO2 SERPL-SCNC: 23 MMOL/L — SIGNIFICANT CHANGE UP (ref 22–31)
CO2 SERPL-SCNC: 24 MMOL/L — SIGNIFICANT CHANGE UP (ref 22–31)
CO2 SERPL-SCNC: 25 MMOL/L — SIGNIFICANT CHANGE UP (ref 22–31)
CO2 SERPL-SCNC: 26 MMOL/L — SIGNIFICANT CHANGE UP (ref 22–31)
CO2 SERPL-SCNC: 27 MMOL/L — SIGNIFICANT CHANGE UP (ref 22–31)
CO2 SERPL-SCNC: 28 MMOL/L — SIGNIFICANT CHANGE UP (ref 22–31)
CO2 SERPL-SCNC: 29 MMOL/L — SIGNIFICANT CHANGE UP (ref 22–31)
CO2 SERPL-SCNC: 30 MMOL/L — SIGNIFICANT CHANGE UP (ref 22–31)
CO2 SERPL-SCNC: 31 MMOL/L — SIGNIFICANT CHANGE UP (ref 22–31)
CO2 SERPL-SCNC: 32 MMOL/L — HIGH (ref 22–31)
CO2 SERPL-SCNC: SIGNIFICANT CHANGE UP (ref 22–31)
COARSE GRAN CASTS #/AREA URNS AUTO: PRESENT
COCAINE METAB.OTHER UR-MCNC: NEGATIVE — SIGNIFICANT CHANGE UP
COCAINE METAB.OTHER UR-MCNC: NEGATIVE — SIGNIFICANT CHANGE UP
COHGB MFR BLDA: 1 % — SIGNIFICANT CHANGE UP
COHGB MFR BLDA: 1.2 % — SIGNIFICANT CHANGE UP
COHGB MFR BLDA: 1.6 % — SIGNIFICANT CHANGE UP
COHGB MFR BLDA: 1.7 % — SIGNIFICANT CHANGE UP
COHGB MFR BLDA: 1.8 % — SIGNIFICANT CHANGE UP
COHGB MFR BLDA: 2.1 % — SIGNIFICANT CHANGE UP
COLLECT DURATION TIME UR: 24 HR — SIGNIFICANT CHANGE UP
COLOR SPEC: ABNORMAL
COLOR SPEC: ABNORMAL
COLOR SPEC: SIGNIFICANT CHANGE UP
COLOR SPEC: YELLOW — SIGNIFICANT CHANGE UP
COMMENT - URINE: SIGNIFICANT CHANGE UP
COPPER SERPL-MCNC: 12 UG/DL — LOW (ref 69–132)
COPPER SERPL-MCNC: 12 UG/DL — LOW (ref 69–132)
COPPER SERPL-MCNC: 17 UG/DL — LOW (ref 69–132)
COPPER SERPL-MCNC: 17 UG/DL — LOW (ref 69–132)
COPPER SERPL-MCNC: 26 UG/DL — LOW (ref 69–132)
COPPER SERPL-MCNC: 26 UG/DL — LOW (ref 69–132)
COPPER SERPL-MCNC: 29 UG/DL — LOW (ref 69–132)
CORTICOSTEROID BINDING GLOBULIN RESULT: 2.6 MG/DL — SIGNIFICANT CHANGE UP
CORTICOSTEROID BINDING GLOBULIN RESULT: 2.6 MG/DL — SIGNIFICANT CHANGE UP
CORTIS AM PEAK SERPL-MCNC: 16.77 UG/DL — SIGNIFICANT CHANGE UP (ref 6.02–18.4)
CORTIS AM PEAK SERPL-MCNC: 9.79 UG/DL — SIGNIFICANT CHANGE UP (ref 6.02–18.4)
CORTIS AM PEAK SERPL-MCNC: 9.79 UG/DL — SIGNIFICANT CHANGE UP (ref 6.02–18.4)
CORTIS F/TOTAL MFR SERPL: 4.2 % — SIGNIFICANT CHANGE UP
CORTIS F/TOTAL MFR SERPL: 4.2 % — SIGNIFICANT CHANGE UP
CORTIS SERPL-MCNC: 10 UG/DL — SIGNIFICANT CHANGE UP
CORTIS SERPL-MCNC: 10 UG/DL — SIGNIFICANT CHANGE UP
CORTISOL, FREE RESULT: 0.42 UG/DL — SIGNIFICANT CHANGE UP
CORTISOL, FREE RESULT: 0.42 UG/DL — SIGNIFICANT CHANGE UP
CREAT ?TM UR-MCNC: 164 MG/DL — SIGNIFICANT CHANGE UP
CREAT ?TM UR-MCNC: 19 MG/DL — SIGNIFICANT CHANGE UP
CREAT ?TM UR-MCNC: 21 MG/DL — SIGNIFICANT CHANGE UP
CREAT ?TM UR-MCNC: 25 MG/DL — SIGNIFICANT CHANGE UP
CREAT ?TM UR-MCNC: 26 MG/DL — SIGNIFICANT CHANGE UP
CREAT ?TM UR-MCNC: 26 MG/DL — SIGNIFICANT CHANGE UP
CREAT ?TM UR-MCNC: 30 MG/DL — SIGNIFICANT CHANGE UP
CREAT ?TM UR-MCNC: 33 MG/DL — SIGNIFICANT CHANGE UP
CREAT ?TM UR-MCNC: 34 MG/DL — SIGNIFICANT CHANGE UP
CREAT ?TM UR-MCNC: 34 MG/DL — SIGNIFICANT CHANGE UP
CREAT ?TM UR-MCNC: 40 MG/DL — SIGNIFICANT CHANGE UP
CREAT ?TM UR-MCNC: 40 MG/DL — SIGNIFICANT CHANGE UP
CREAT ?TM UR-MCNC: 51 MG/DL — SIGNIFICANT CHANGE UP
CREAT ?TM UR-MCNC: 51 MG/DL — SIGNIFICANT CHANGE UP
CREAT ?TM UR-MCNC: 54 MG/DL — SIGNIFICANT CHANGE UP
CREAT ?TM UR-MCNC: 55 MG/DL — SIGNIFICANT CHANGE UP
CREAT ?TM UR-MCNC: 55 MG/DL — SIGNIFICANT CHANGE UP
CREAT ?TM UR-MCNC: 59 MG/DL — SIGNIFICANT CHANGE UP
CREAT ?TM UR-MCNC: 74 MG/DL — SIGNIFICANT CHANGE UP
CREAT ?TM UR-MCNC: 74 MG/DL — SIGNIFICANT CHANGE UP
CREAT ?TM UR-MCNC: 75 MG/DL — SIGNIFICANT CHANGE UP
CREAT ?TM UR-MCNC: 96 MG/DL — SIGNIFICANT CHANGE UP
CREAT ?TM UR-MCNC: <1 MG/DL — SIGNIFICANT CHANGE UP
CREAT SERPL-MCNC: 0.64 MG/DL — SIGNIFICANT CHANGE UP (ref 0.5–1.3)
CREAT SERPL-MCNC: 0.66 MG/DL — SIGNIFICANT CHANGE UP (ref 0.5–1.3)
CREAT SERPL-MCNC: 0.68 MG/DL — SIGNIFICANT CHANGE UP (ref 0.5–1.3)
CREAT SERPL-MCNC: 0.69 MG/DL — SIGNIFICANT CHANGE UP (ref 0.5–1.3)
CREAT SERPL-MCNC: 0.69 MG/DL — SIGNIFICANT CHANGE UP (ref 0.5–1.3)
CREAT SERPL-MCNC: 0.71 MG/DL — SIGNIFICANT CHANGE UP (ref 0.5–1.3)
CREAT SERPL-MCNC: 0.71 MG/DL — SIGNIFICANT CHANGE UP (ref 0.5–1.3)
CREAT SERPL-MCNC: 0.72 MG/DL — SIGNIFICANT CHANGE UP (ref 0.5–1.3)
CREAT SERPL-MCNC: 0.73 MG/DL — SIGNIFICANT CHANGE UP (ref 0.5–1.3)
CREAT SERPL-MCNC: 0.74 MG/DL — SIGNIFICANT CHANGE UP (ref 0.5–1.3)
CREAT SERPL-MCNC: 0.75 MG/DL — SIGNIFICANT CHANGE UP (ref 0.5–1.3)
CREAT SERPL-MCNC: 0.76 MG/DL — SIGNIFICANT CHANGE UP (ref 0.5–1.3)
CREAT SERPL-MCNC: 0.77 MG/DL — SIGNIFICANT CHANGE UP (ref 0.5–1.3)
CREAT SERPL-MCNC: 0.77 MG/DL — SIGNIFICANT CHANGE UP (ref 0.5–1.3)
CREAT SERPL-MCNC: 0.79 MG/DL — SIGNIFICANT CHANGE UP (ref 0.5–1.3)
CREAT SERPL-MCNC: 0.81 MG/DL — SIGNIFICANT CHANGE UP (ref 0.5–1.3)
CREAT SERPL-MCNC: 0.86 MG/DL — SIGNIFICANT CHANGE UP (ref 0.5–1.3)
CREAT SERPL-MCNC: 0.87 MG/DL — SIGNIFICANT CHANGE UP (ref 0.5–1.3)
CREAT SERPL-MCNC: 0.88 MG/DL — SIGNIFICANT CHANGE UP (ref 0.5–1.3)
CREAT SERPL-MCNC: 0.9 MG/DL — SIGNIFICANT CHANGE UP (ref 0.5–1.3)
CREAT SERPL-MCNC: 0.91 MG/DL — SIGNIFICANT CHANGE UP (ref 0.5–1.3)
CREAT SERPL-MCNC: 0.91 MG/DL — SIGNIFICANT CHANGE UP (ref 0.5–1.3)
CREAT SERPL-MCNC: 0.92 MG/DL — SIGNIFICANT CHANGE UP (ref 0.5–1.3)
CREAT SERPL-MCNC: 0.93 MG/DL — SIGNIFICANT CHANGE UP (ref 0.5–1.3)
CREAT SERPL-MCNC: 0.94 MG/DL — SIGNIFICANT CHANGE UP (ref 0.5–1.3)
CREAT SERPL-MCNC: 0.94 MG/DL — SIGNIFICANT CHANGE UP (ref 0.5–1.3)
CREAT SERPL-MCNC: 0.95 MG/DL — SIGNIFICANT CHANGE UP (ref 0.5–1.3)
CREAT SERPL-MCNC: 0.96 MG/DL — SIGNIFICANT CHANGE UP (ref 0.5–1.3)
CREAT SERPL-MCNC: 0.97 MG/DL — SIGNIFICANT CHANGE UP (ref 0.5–1.3)
CREAT SERPL-MCNC: 0.98 MG/DL — SIGNIFICANT CHANGE UP (ref 0.5–1.3)
CREAT SERPL-MCNC: 0.99 MG/DL — SIGNIFICANT CHANGE UP (ref 0.5–1.3)
CREAT SERPL-MCNC: 1 MG/DL — SIGNIFICANT CHANGE UP (ref 0.5–1.3)
CREAT SERPL-MCNC: 1.01 MG/DL — SIGNIFICANT CHANGE UP (ref 0.5–1.3)
CREAT SERPL-MCNC: 1.02 MG/DL — SIGNIFICANT CHANGE UP (ref 0.5–1.3)
CREAT SERPL-MCNC: 1.03 MG/DL — SIGNIFICANT CHANGE UP (ref 0.5–1.3)
CREAT SERPL-MCNC: 1.05 MG/DL — SIGNIFICANT CHANGE UP (ref 0.5–1.3)
CREAT SERPL-MCNC: 1.06 MG/DL — SIGNIFICANT CHANGE UP (ref 0.5–1.3)
CREAT SERPL-MCNC: 1.07 MG/DL — SIGNIFICANT CHANGE UP (ref 0.5–1.3)
CREAT SERPL-MCNC: 1.08 MG/DL — SIGNIFICANT CHANGE UP (ref 0.5–1.3)
CREAT SERPL-MCNC: 1.09 MG/DL — SIGNIFICANT CHANGE UP (ref 0.5–1.3)
CREAT SERPL-MCNC: 1.1 MG/DL — SIGNIFICANT CHANGE UP (ref 0.5–1.3)
CREAT SERPL-MCNC: 1.11 MG/DL — SIGNIFICANT CHANGE UP (ref 0.5–1.3)
CREAT SERPL-MCNC: 1.12 MG/DL — SIGNIFICANT CHANGE UP (ref 0.5–1.3)
CREAT SERPL-MCNC: 1.12 MG/DL — SIGNIFICANT CHANGE UP (ref 0.5–1.3)
CREAT SERPL-MCNC: 1.13 MG/DL — SIGNIFICANT CHANGE UP (ref 0.5–1.3)
CREAT SERPL-MCNC: 1.14 MG/DL — SIGNIFICANT CHANGE UP (ref 0.5–1.3)
CREAT SERPL-MCNC: 1.14 MG/DL — SIGNIFICANT CHANGE UP (ref 0.5–1.3)
CREAT SERPL-MCNC: 1.15 MG/DL — SIGNIFICANT CHANGE UP (ref 0.5–1.3)
CREAT SERPL-MCNC: 1.15 MG/DL — SIGNIFICANT CHANGE UP (ref 0.5–1.3)
CREAT SERPL-MCNC: 1.16 MG/DL — SIGNIFICANT CHANGE UP (ref 0.5–1.3)
CREAT SERPL-MCNC: 1.17 MG/DL — SIGNIFICANT CHANGE UP (ref 0.5–1.3)
CREAT SERPL-MCNC: 1.18 MG/DL — SIGNIFICANT CHANGE UP (ref 0.5–1.3)
CREAT SERPL-MCNC: 1.19 MG/DL — SIGNIFICANT CHANGE UP (ref 0.5–1.3)
CREAT SERPL-MCNC: 1.2 MG/DL — SIGNIFICANT CHANGE UP (ref 0.5–1.3)
CREAT SERPL-MCNC: 1.21 MG/DL — SIGNIFICANT CHANGE UP (ref 0.5–1.3)
CREAT SERPL-MCNC: 1.22 MG/DL — SIGNIFICANT CHANGE UP (ref 0.5–1.3)
CREAT SERPL-MCNC: 1.23 MG/DL — SIGNIFICANT CHANGE UP (ref 0.5–1.3)
CREAT SERPL-MCNC: 1.24 MG/DL — SIGNIFICANT CHANGE UP (ref 0.5–1.3)
CREAT SERPL-MCNC: 1.25 MG/DL — SIGNIFICANT CHANGE UP (ref 0.5–1.3)
CREAT SERPL-MCNC: 1.26 MG/DL — SIGNIFICANT CHANGE UP (ref 0.5–1.3)
CREAT SERPL-MCNC: 1.27 MG/DL — SIGNIFICANT CHANGE UP (ref 0.5–1.3)
CREAT SERPL-MCNC: 1.28 MG/DL — SIGNIFICANT CHANGE UP (ref 0.5–1.3)
CREAT SERPL-MCNC: 1.29 MG/DL — SIGNIFICANT CHANGE UP (ref 0.5–1.3)
CREAT SERPL-MCNC: 1.3 MG/DL — SIGNIFICANT CHANGE UP (ref 0.5–1.3)
CREAT SERPL-MCNC: 1.31 MG/DL — HIGH (ref 0.5–1.3)
CREAT SERPL-MCNC: 1.32 MG/DL — HIGH (ref 0.5–1.3)
CREAT SERPL-MCNC: 1.34 MG/DL — HIGH (ref 0.5–1.3)
CREAT SERPL-MCNC: 1.34 MG/DL — HIGH (ref 0.5–1.3)
CREAT SERPL-MCNC: 1.35 MG/DL — HIGH (ref 0.5–1.3)
CREAT SERPL-MCNC: 1.36 MG/DL — HIGH (ref 0.5–1.3)
CREAT SERPL-MCNC: 1.36 MG/DL — HIGH (ref 0.5–1.3)
CREAT SERPL-MCNC: 1.39 MG/DL — HIGH (ref 0.5–1.3)
CREAT SERPL-MCNC: 1.39 MG/DL — HIGH (ref 0.5–1.3)
CREAT SERPL-MCNC: 1.42 MG/DL — HIGH (ref 0.5–1.3)
CREAT SERPL-MCNC: 1.51 MG/DL — HIGH (ref 0.5–1.3)
CREAT SERPL-MCNC: 1.52 MG/DL — HIGH (ref 0.5–1.3)
CREAT SERPL-MCNC: 1.52 MG/DL — HIGH (ref 0.5–1.3)
CREAT SERPL-MCNC: 1.53 MG/DL — HIGH (ref 0.5–1.3)
CREAT SERPL-MCNC: 1.53 MG/DL — HIGH (ref 0.5–1.3)
CREAT SERPL-MCNC: 1.55 MG/DL — HIGH (ref 0.5–1.3)
CREAT SERPL-MCNC: 1.55 MG/DL — HIGH (ref 0.5–1.3)
CREAT SERPL-MCNC: 1.56 MG/DL — HIGH (ref 0.5–1.3)
CREAT SERPL-MCNC: 1.58 MG/DL — HIGH (ref 0.5–1.3)
CREAT SERPL-MCNC: 1.62 MG/DL — HIGH (ref 0.5–1.3)
CREAT SERPL-MCNC: 1.62 MG/DL — HIGH (ref 0.5–1.3)
CREAT SERPL-MCNC: 1.67 MG/DL — HIGH (ref 0.5–1.3)
CREAT SERPL-MCNC: 1.72 MG/DL — HIGH (ref 0.5–1.3)
CREAT SERPL-MCNC: 1.72 MG/DL — HIGH (ref 0.5–1.3)
CREAT SERPL-MCNC: 1.76 MG/DL — HIGH (ref 0.5–1.3)
CREAT SERPL-MCNC: 1.76 MG/DL — HIGH (ref 0.5–1.3)
CREAT SERPL-MCNC: 1.77 MG/DL — HIGH (ref 0.5–1.3)
CREAT SERPL-MCNC: 1.81 MG/DL — HIGH (ref 0.5–1.3)
CREAT SERPL-MCNC: 1.81 MG/DL — HIGH (ref 0.5–1.3)
CREAT SERPL-MCNC: 1.85 MG/DL — HIGH (ref 0.5–1.3)
CREAT SERPL-MCNC: 1.85 MG/DL — HIGH (ref 0.5–1.3)
CREAT SERPL-MCNC: 1.92 MG/DL — HIGH (ref 0.5–1.3)
CREAT SERPL-MCNC: 1.95 MG/DL — HIGH (ref 0.5–1.3)
CREAT SERPL-MCNC: 1.97 MG/DL — HIGH (ref 0.5–1.3)
CREAT SERPL-MCNC: 1.97 MG/DL — HIGH (ref 0.5–1.3)
CREAT SERPL-MCNC: 1.99 MG/DL — HIGH (ref 0.5–1.3)
CREAT SERPL-MCNC: 1.99 MG/DL — HIGH (ref 0.5–1.3)
CREAT SERPL-MCNC: 2 MG/DL — HIGH (ref 0.5–1.3)
CREAT SERPL-MCNC: 2 MG/DL — HIGH (ref 0.5–1.3)
CREAT SERPL-MCNC: 2.01 MG/DL — HIGH (ref 0.5–1.3)
CREAT SERPL-MCNC: 2.01 MG/DL — HIGH (ref 0.5–1.3)
CREAT SERPL-MCNC: 2.03 MG/DL — HIGH (ref 0.5–1.3)
CREAT SERPL-MCNC: 2.03 MG/DL — HIGH (ref 0.5–1.3)
CREAT SERPL-MCNC: 2.05 MG/DL — HIGH (ref 0.5–1.3)
CREAT SERPL-MCNC: 2.1 MG/DL — HIGH (ref 0.5–1.3)
CREAT SERPL-MCNC: 2.1 MG/DL — HIGH (ref 0.5–1.3)
CREAT SERPL-MCNC: 2.14 MG/DL — HIGH (ref 0.5–1.3)
CREAT SERPL-MCNC: 2.14 MG/DL — HIGH (ref 0.5–1.3)
CREAT SERPL-MCNC: 2.17 MG/DL — HIGH (ref 0.5–1.3)
CREAT SERPL-MCNC: 2.17 MG/DL — HIGH (ref 0.5–1.3)
CREAT SERPL-MCNC: 2.18 MG/DL — HIGH (ref 0.5–1.3)
CREAT SERPL-MCNC: 2.18 MG/DL — HIGH (ref 0.5–1.3)
CREAT SERPL-MCNC: 2.3 MG/DL — HIGH (ref 0.5–1.3)
CREAT SERPL-MCNC: 2.34 MG/DL — HIGH (ref 0.5–1.3)
CREAT SERPL-MCNC: 2.34 MG/DL — HIGH (ref 0.5–1.3)
CREAT SERPL-MCNC: 2.36 MG/DL — HIGH (ref 0.5–1.3)
CREAT SERPL-MCNC: 2.36 MG/DL — HIGH (ref 0.5–1.3)
CREAT SERPL-MCNC: 2.39 MG/DL — HIGH (ref 0.5–1.3)
CREAT SERPL-MCNC: 2.51 MG/DL — HIGH (ref 0.5–1.3)
CREAT SERPL-MCNC: 2.51 MG/DL — HIGH (ref 0.5–1.3)
CREAT SERPL-MCNC: 2.52 MG/DL — HIGH (ref 0.5–1.3)
CREAT SERPL-MCNC: 2.58 MG/DL — HIGH (ref 0.5–1.3)
CREAT SERPL-MCNC: 2.58 MG/DL — HIGH (ref 0.5–1.3)
CREAT SERPL-MCNC: 2.59 MG/DL — HIGH (ref 0.5–1.3)
CREAT SERPL-MCNC: 2.59 MG/DL — HIGH (ref 0.5–1.3)
CREAT SERPL-MCNC: 2.61 MG/DL — HIGH (ref 0.5–1.3)
CREAT SERPL-MCNC: 2.61 MG/DL — HIGH (ref 0.5–1.3)
CREAT SERPL-MCNC: 2.62 MG/DL — HIGH (ref 0.5–1.3)
CREAT SERPL-MCNC: 2.62 MG/DL — HIGH (ref 0.5–1.3)
CREAT SERPL-MCNC: 2.63 MG/DL — HIGH (ref 0.5–1.3)
CREAT SERPL-MCNC: 2.63 MG/DL — HIGH (ref 0.5–1.3)
CREAT SERPL-MCNC: 2.68 MG/DL — HIGH (ref 0.5–1.3)
CREAT SERPL-MCNC: 2.68 MG/DL — HIGH (ref 0.5–1.3)
CREAT SERPL-MCNC: 2.72 MG/DL — HIGH (ref 0.5–1.3)
CREAT SERPL-MCNC: 2.72 MG/DL — HIGH (ref 0.5–1.3)
CREAT SERPL-MCNC: 2.74 MG/DL — HIGH (ref 0.5–1.3)
CREAT SERPL-MCNC: 2.74 MG/DL — HIGH (ref 0.5–1.3)
CREAT SERPL-MCNC: 2.76 MG/DL — HIGH (ref 0.5–1.3)
CREAT SERPL-MCNC: 2.76 MG/DL — HIGH (ref 0.5–1.3)
CREAT SERPL-MCNC: 2.78 MG/DL — HIGH (ref 0.5–1.3)
CREAT SERPL-MCNC: 2.89 MG/DL — HIGH (ref 0.5–1.3)
CREAT SERPL-MCNC: 2.89 MG/DL — HIGH (ref 0.5–1.3)
CREAT SERPL-MCNC: 3.08 MG/DL — HIGH (ref 0.5–1.3)
CREAT SERPL-MCNC: 3.13 MG/DL — HIGH (ref 0.5–1.3)
CREAT SERPL-MCNC: 3.38 MG/DL — HIGH (ref 0.5–1.3)
CREAT SERPL-MCNC: 3.42 MG/DL — HIGH (ref 0.5–1.3)
CREAT SERPL-MCNC: 3.43 MG/DL — HIGH (ref 0.5–1.3)
CREAT SERPL-MCNC: 3.51 MG/DL — HIGH (ref 0.5–1.3)
CREAT SERPL-MCNC: 3.55 MG/DL — HIGH (ref 0.5–1.3)
CREAT SERPL-MCNC: 3.56 MG/DL — HIGH (ref 0.5–1.3)
CREAT SERPL-MCNC: 3.71 MG/DL — HIGH (ref 0.5–1.3)
CREAT SERPL-MCNC: 3.74 MG/DL — HIGH (ref 0.5–1.3)
CREATININE, URINE RESULT: 52 MG/DL — SIGNIFICANT CHANGE UP
CREATININE, URINE RESULT: 52 MG/DL — SIGNIFICANT CHANGE UP
CRYOGLOB SERPL-MCNC: NEGATIVE — SIGNIFICANT CHANGE UP
CRYOGLOB SERPL-MCNC: NEGATIVE — SIGNIFICANT CHANGE UP
CULTURE RESULTS: ABNORMAL
CULTURE RESULTS: NO GROWTH — SIGNIFICANT CHANGE UP
CULTURE RESULTS: SIGNIFICANT CHANGE UP
CYSTATIN C SERPL-MCNC: 1.74 MG/L — HIGH (ref 0.82–1.52)
CYSTATIN C SERPL-MCNC: 1.74 MG/L — HIGH (ref 0.82–1.52)
CYSTATIN C SERPL-MCNC: 2 MG/L — HIGH (ref 0.82–1.52)
CYSTATIN C SERPL-MCNC: 2.06 MG/L — HIGH (ref 0.82–1.52)
CYSTATIN C SERPL-MCNC: 2.06 MG/L — HIGH (ref 0.82–1.52)
CYSTATIN C SERPL-MCNC: 2.19 MG/L — HIGH (ref 0.82–1.52)
DACRYOCYTES BLD QL SMEAR: SLIGHT — SIGNIFICANT CHANGE UP
DIFF PNL FLD: ABNORMAL
DIFF PNL FLD: NEGATIVE — SIGNIFICANT CHANGE UP
DSDNA AB SER-ACNC: <12 IU/ML — SIGNIFICANT CHANGE UP
DSDNA AB SER-ACNC: <12 IU/ML — SIGNIFICANT CHANGE UP
E CLOAC COMP DNA BLD POS QL NAA+PROBE: SIGNIFICANT CHANGE UP
E CLOAC COMP DNA BLD POS QL NAA+PROBE: SIGNIFICANT CHANGE UP
E COLI DNA BLD POS QL NAA+NON-PROBE: SIGNIFICANT CHANGE UP
E COLI DNA BLD POS QL NAA+NON-PROBE: SIGNIFICANT CHANGE UP
E FAECALIS DNA BLD POS QL NAA+NON-PROBE: SIGNIFICANT CHANGE UP
E FAECIUM DNA BLD POS QL NAA+NON-PROBE: SIGNIFICANT CHANGE UP
EBV EA AB SER IA-ACNC: 8 U/ML — SIGNIFICANT CHANGE UP
EBV EA AB SER IA-ACNC: 8 U/ML — SIGNIFICANT CHANGE UP
EBV EA AB TITR SER IF: POSITIVE
EBV EA AB TITR SER IF: POSITIVE
EBV EA IGG SER-ACNC: NEGATIVE — SIGNIFICANT CHANGE UP
EBV EA IGG SER-ACNC: NEGATIVE — SIGNIFICANT CHANGE UP
EBV NA IGG SER IA-ACNC: >600 U/ML — HIGH
EBV NA IGG SER IA-ACNC: >600 U/ML — HIGH
EBV PATRN SPEC IB-IMP: SIGNIFICANT CHANGE UP
EBV PATRN SPEC IB-IMP: SIGNIFICANT CHANGE UP
EBV VCA IGG AVIDITY SER QL IA: POSITIVE
EBV VCA IGG AVIDITY SER QL IA: POSITIVE
EBV VCA IGM SER IA-ACNC: <10 U/ML — SIGNIFICANT CHANGE UP
EBV VCA IGM SER IA-ACNC: <10 U/ML — SIGNIFICANT CHANGE UP
EBV VCA IGM SER IA-ACNC: >750 U/ML — HIGH
EBV VCA IGM SER IA-ACNC: >750 U/ML — HIGH
EBV VCA IGM TITR FLD: NEGATIVE — SIGNIFICANT CHANGE UP
EBV VCA IGM TITR FLD: NEGATIVE — SIGNIFICANT CHANGE UP
EGFR: 16 ML/MIN/1.73M2 — LOW
EGFR: 16 ML/MIN/1.73M2 — LOW
EGFR: 17 ML/MIN/1.73M2 — LOW
EGFR: 18 ML/MIN/1.73M2 — LOW
EGFR: 20 ML/MIN/1.73M2 — LOW
EGFR: 20 ML/MIN/1.73M2 — LOW
EGFR: 22 ML/MIN/1.73M2 — LOW
EGFR: 22 ML/MIN/1.73M2 — LOW
EGFR: 23 ML/MIN/1.73M2 — LOW
EGFR: 24 ML/MIN/1.73M2 — LOW
EGFR: 25 ML/MIN/1.73M2 — LOW
EGFR: 26 ML/MIN/1.73M2 — LOW
EGFR: 27 ML/MIN/1.73M2 — LOW
EGFR: 28 ML/MIN/1.73M2 — LOW
EGFR: 29 ML/MIN/1.73M2 — LOW
EGFR: 30 ML/MIN/1.73M2 — LOW
EGFR: 30 ML/MIN/1.73M2 — LOW
EGFR: 31 ML/MIN/1.73M2 — LOW
EGFR: 32 ML/MIN/1.73M2 — LOW
EGFR: 32 ML/MIN/1.73M2 — LOW
EGFR: 33 ML/MIN/1.73M2 — LOW
EGFR: 34 ML/MIN/1.73M2 — LOW
EGFR: 35 ML/MIN/1.73M2 — LOW
EGFR: 35 ML/MIN/1.73M2 — LOW
EGFR: 37 ML/MIN/1.73M2 — LOW
EGFR: 37 ML/MIN/1.73M2 — LOW
EGFR: 38 ML/MIN/1.73M2 — LOW
EGFR: 38 ML/MIN/1.73M2 — LOW
EGFR: 39 ML/MIN/1.73M2 — LOW
EGFR: 40 ML/MIN/1.73M2 — LOW
EGFR: 40 ML/MIN/1.73M2 — LOW
EGFR: 42 ML/MIN/1.73M2 — LOW
EGFR: 43 ML/MIN/1.73M2 — LOW
EGFR: 43 ML/MIN/1.73M2 — LOW
EGFR: 45 ML/MIN/1.73M2 — LOW
EGFR: 46 ML/MIN/1.73M2 — LOW
EGFR: 46 ML/MIN/1.73M2 — LOW
EGFR: 47 ML/MIN/1.73M2 — LOW
EGFR: 51 ML/MIN/1.73M2 — LOW
EGFR: 52 ML/MIN/1.73M2 — LOW
EGFR: 52 ML/MIN/1.73M2 — LOW
EGFR: 54 ML/MIN/1.73M2 — LOW
EGFR: 55 ML/MIN/1.73M2 — LOW
EGFR: 55 ML/MIN/1.73M2 — LOW
EGFR: 56 ML/MIN/1.73M2 — LOW
EGFR: 57 ML/MIN/1.73M2 — LOW
EGFR: 58 ML/MIN/1.73M2 — LOW
EGFR: 58 ML/MIN/1.73M2 — LOW
EGFR: 59 ML/MIN/1.73M2 — LOW
EGFR: 60 ML/MIN/1.73M2 — SIGNIFICANT CHANGE UP
EGFR: 61 ML/MIN/1.73M2 — SIGNIFICANT CHANGE UP
EGFR: 62 ML/MIN/1.73M2 — SIGNIFICANT CHANGE UP
EGFR: 62 ML/MIN/1.73M2 — SIGNIFICANT CHANGE UP
EGFR: 63 ML/MIN/1.73M2 — SIGNIFICANT CHANGE UP
EGFR: 64 ML/MIN/1.73M2 — SIGNIFICANT CHANGE UP
EGFR: 65 ML/MIN/1.73M2 — SIGNIFICANT CHANGE UP
EGFR: 66 ML/MIN/1.73M2 — SIGNIFICANT CHANGE UP
EGFR: 67 ML/MIN/1.73M2 — SIGNIFICANT CHANGE UP
EGFR: 68 ML/MIN/1.73M2 — SIGNIFICANT CHANGE UP
EGFR: 69 ML/MIN/1.73M2 — SIGNIFICANT CHANGE UP
EGFR: 70 ML/MIN/1.73M2 — SIGNIFICANT CHANGE UP
EGFR: 71 ML/MIN/1.73M2 — SIGNIFICANT CHANGE UP
EGFR: 72 ML/MIN/1.73M2 — SIGNIFICANT CHANGE UP
EGFR: 73 ML/MIN/1.73M2 — SIGNIFICANT CHANGE UP
EGFR: 75 ML/MIN/1.73M2 — SIGNIFICANT CHANGE UP
EGFR: 76 ML/MIN/1.73M2 — SIGNIFICANT CHANGE UP
EGFR: 77 ML/MIN/1.73M2 — SIGNIFICANT CHANGE UP
EGFR: 78 ML/MIN/1.73M2 — SIGNIFICANT CHANGE UP
EGFR: 79 ML/MIN/1.73M2 — SIGNIFICANT CHANGE UP
EGFR: 80 ML/MIN/1.73M2 — SIGNIFICANT CHANGE UP
EGFR: 81 ML/MIN/1.73M2 — SIGNIFICANT CHANGE UP
EGFR: 82 ML/MIN/1.73M2 — SIGNIFICANT CHANGE UP
EGFR: 83 ML/MIN/1.73M2 — SIGNIFICANT CHANGE UP
EGFR: 83 ML/MIN/1.73M2 — SIGNIFICANT CHANGE UP
EGFR: 85 ML/MIN/1.73M2 — SIGNIFICANT CHANGE UP
EGFR: 86 ML/MIN/1.73M2 — SIGNIFICANT CHANGE UP
EGFR: 87 ML/MIN/1.73M2 — SIGNIFICANT CHANGE UP
EGFR: 87 ML/MIN/1.73M2 — SIGNIFICANT CHANGE UP
EGFR: 88 ML/MIN/1.73M2 — SIGNIFICANT CHANGE UP
EGFR: 89 ML/MIN/1.73M2 — SIGNIFICANT CHANGE UP
EGFR: 91 ML/MIN/1.73M2 — SIGNIFICANT CHANGE UP
EGFR: 92 ML/MIN/1.73M2 — SIGNIFICANT CHANGE UP
EGFR: 93 ML/MIN/1.73M2 — SIGNIFICANT CHANGE UP
EGFR: 94 ML/MIN/1.73M2 — SIGNIFICANT CHANGE UP
EGFR: 95 ML/MIN/1.73M2 — SIGNIFICANT CHANGE UP
EGFR: 95 ML/MIN/1.73M2 — SIGNIFICANT CHANGE UP
EGFR: 96 ML/MIN/1.73M2 — SIGNIFICANT CHANGE UP
EGFR: 97 ML/MIN/1.73M2 — SIGNIFICANT CHANGE UP
EGFR: 98 ML/MIN/1.73M2 — SIGNIFICANT CHANGE UP
EOSINOPHIL # BLD AUTO: 0 K/UL — SIGNIFICANT CHANGE UP (ref 0–0.5)
EOSINOPHIL # BLD AUTO: 0.02 K/UL — SIGNIFICANT CHANGE UP (ref 0–0.5)
EOSINOPHIL # BLD AUTO: 0.1 K/UL — SIGNIFICANT CHANGE UP (ref 0–0.5)
EOSINOPHIL # BLD AUTO: 0.1 K/UL — SIGNIFICANT CHANGE UP (ref 0–0.5)
EOSINOPHIL # BLD AUTO: 0.11 K/UL — SIGNIFICANT CHANGE UP (ref 0–0.5)
EOSINOPHIL # BLD AUTO: 0.12 K/UL — SIGNIFICANT CHANGE UP (ref 0–0.5)
EOSINOPHIL # BLD AUTO: 0.19 K/UL — SIGNIFICANT CHANGE UP (ref 0–0.5)
EOSINOPHIL # BLD AUTO: 0.19 K/UL — SIGNIFICANT CHANGE UP (ref 0–0.5)
EOSINOPHIL # BLD AUTO: 0.22 K/UL — SIGNIFICANT CHANGE UP (ref 0–0.5)
EOSINOPHIL # BLD AUTO: 0.26 K/UL — SIGNIFICANT CHANGE UP (ref 0–0.5)
EOSINOPHIL # BLD AUTO: 0.26 K/UL — SIGNIFICANT CHANGE UP (ref 0–0.5)
EOSINOPHIL # BLD AUTO: 0.27 K/UL — SIGNIFICANT CHANGE UP (ref 0–0.5)
EOSINOPHIL # BLD AUTO: 0.29 K/UL — SIGNIFICANT CHANGE UP (ref 0–0.5)
EOSINOPHIL # BLD AUTO: 0.29 K/UL — SIGNIFICANT CHANGE UP (ref 0–0.5)
EOSINOPHIL # BLD AUTO: 0.3 K/UL — SIGNIFICANT CHANGE UP (ref 0–0.5)
EOSINOPHIL # BLD AUTO: 0.36 K/UL — SIGNIFICANT CHANGE UP (ref 0–0.5)
EOSINOPHIL # BLD AUTO: 0.37 K/UL — SIGNIFICANT CHANGE UP (ref 0–0.5)
EOSINOPHIL # BLD AUTO: 0.38 K/UL — SIGNIFICANT CHANGE UP (ref 0–0.5)
EOSINOPHIL # BLD AUTO: 0.38 K/UL — SIGNIFICANT CHANGE UP (ref 0–0.5)
EOSINOPHIL # BLD AUTO: 0.39 K/UL — SIGNIFICANT CHANGE UP (ref 0–0.5)
EOSINOPHIL # BLD AUTO: 0.41 K/UL — SIGNIFICANT CHANGE UP (ref 0–0.5)
EOSINOPHIL # BLD AUTO: 0.41 K/UL — SIGNIFICANT CHANGE UP (ref 0–0.5)
EOSINOPHIL # BLD AUTO: 0.42 K/UL — SIGNIFICANT CHANGE UP (ref 0–0.5)
EOSINOPHIL # BLD AUTO: 0.47 K/UL — SIGNIFICANT CHANGE UP (ref 0–0.5)
EOSINOPHIL # BLD AUTO: 0.47 K/UL — SIGNIFICANT CHANGE UP (ref 0–0.5)
EOSINOPHIL # BLD AUTO: 0.63 K/UL — HIGH (ref 0–0.5)
EOSINOPHIL # BLD AUTO: 0.78 K/UL — HIGH (ref 0–0.5)
EOSINOPHIL NFR BLD AUTO: 0 % — SIGNIFICANT CHANGE UP (ref 0–6)
EOSINOPHIL NFR BLD AUTO: 0.2 % — SIGNIFICANT CHANGE UP (ref 0–6)
EOSINOPHIL NFR BLD AUTO: 0.9 % — SIGNIFICANT CHANGE UP (ref 0–6)
EOSINOPHIL NFR BLD AUTO: 1.1 % — SIGNIFICANT CHANGE UP (ref 0–6)
EOSINOPHIL NFR BLD AUTO: 1.1 % — SIGNIFICANT CHANGE UP (ref 0–6)
EOSINOPHIL NFR BLD AUTO: 1.8 % — SIGNIFICANT CHANGE UP (ref 0–6)
EOSINOPHIL NFR BLD AUTO: 1.8 % — SIGNIFICANT CHANGE UP (ref 0–6)
EOSINOPHIL NFR BLD AUTO: 2.6 % — SIGNIFICANT CHANGE UP (ref 0–6)
EOSINOPHIL NFR BLD AUTO: 2.7 % — SIGNIFICANT CHANGE UP (ref 0–6)
EOSINOPHIL NFR BLD AUTO: 3 % — SIGNIFICANT CHANGE UP (ref 0–6)
EOSINOPHIL NFR BLD AUTO: 3.2 % — SIGNIFICANT CHANGE UP (ref 0–6)
EOSINOPHIL NFR BLD AUTO: 3.2 % — SIGNIFICANT CHANGE UP (ref 0–6)
EOSINOPHIL NFR BLD AUTO: 3.5 % — SIGNIFICANT CHANGE UP (ref 0–6)
EOSINOPHIL NFR BLD AUTO: 3.5 % — SIGNIFICANT CHANGE UP (ref 0–6)
EOSINOPHIL NFR BLD AUTO: 3.6 % — SIGNIFICANT CHANGE UP (ref 0–6)
EOSINOPHIL NFR BLD AUTO: 3.6 % — SIGNIFICANT CHANGE UP (ref 0–6)
EOSINOPHIL NFR BLD AUTO: 3.8 % — SIGNIFICANT CHANGE UP (ref 0–6)
EOSINOPHIL NFR BLD AUTO: 4.3 % — SIGNIFICANT CHANGE UP (ref 0–6)
EOSINOPHIL NFR BLD AUTO: 4.4 % — SIGNIFICANT CHANGE UP (ref 0–6)
EOSINOPHIL NFR BLD AUTO: 4.7 % — SIGNIFICANT CHANGE UP (ref 0–6)
EOSINOPHIL NFR BLD AUTO: 4.7 % — SIGNIFICANT CHANGE UP (ref 0–6)
EOSINOPHIL NFR BLD AUTO: 5.4 % — SIGNIFICANT CHANGE UP (ref 0–6)
EOSINOPHIL NFR BLD AUTO: 5.5 % — SIGNIFICANT CHANGE UP (ref 0–6)
EOSINOPHIL NFR BLD AUTO: 6.1 % — HIGH (ref 0–6)
EOSINOPHIL NFR BLD AUTO: 6.3 % — HIGH (ref 0–6)
EOSINOPHIL NFR BLD AUTO: 6.3 % — HIGH (ref 0–6)
EPI CELLS # UR: SIGNIFICANT CHANGE UP /HPF (ref 0–5)
ESTIMATED AVERAGE GLUCOSE: 100 MG/DL — SIGNIFICANT CHANGE UP (ref 68–114)
ESTIMATED AVERAGE GLUCOSE: 91 MG/DL — SIGNIFICANT CHANGE UP (ref 68–114)
FAT STL QN: NORMAL — SIGNIFICANT CHANGE UP
FAT STL QN: NORMAL — SIGNIFICANT CHANGE UP
FERRITIN SERPL-MCNC: 1968 NG/ML — HIGH (ref 30–400)
FERRITIN SERPL-MCNC: 1968 NG/ML — HIGH (ref 30–400)
FERRITIN SERPL-MCNC: 610 NG/ML — HIGH (ref 30–400)
FERRITIN SERPL-MCNC: 610 NG/ML — HIGH (ref 30–400)
FIBRINOGEN PPP-MCNC: 299 MG/DL — SIGNIFICANT CHANGE UP (ref 200–445)
FIBRINOGEN PPP-MCNC: 299 MG/DL — SIGNIFICANT CHANGE UP (ref 200–445)
FIBRINOGEN PPP-MCNC: 311 MG/DL — SIGNIFICANT CHANGE UP (ref 200–445)
FIBRINOGEN PPP-MCNC: 311 MG/DL — SIGNIFICANT CHANGE UP (ref 200–445)
FOLATE SERPL-MCNC: >20 NG/ML — SIGNIFICANT CHANGE UP
FUNGITELL: <31 PG/ML — SIGNIFICANT CHANGE UP
FUNGITELL: <31 PG/ML — SIGNIFICANT CHANGE UP
FUNGITELL: >500 PG/ML
FUNGITELL: >500 PG/ML
GAMMA GLOBULIN: 3.3 G/DL — HIGH (ref 0.6–1.6)
GAMMA GLOBULIN: 3.3 G/DL — HIGH (ref 0.6–1.6)
GAS PNL BLDA: SIGNIFICANT CHANGE UP
GAS PNL BLDV: 132 MMOL/L — LOW (ref 136–145)
GAS PNL BLDV: SIGNIFICANT CHANGE UP
GBM IGG SER-ACNC: <0.2 — SIGNIFICANT CHANGE UP (ref 0–0.9)
GBM IGG SER-ACNC: <0.2 — SIGNIFICANT CHANGE UP (ref 0–0.9)
GFR/BSA.PRED SERPLBLD CYS-BASED-ARV: 26 ML/MIN/1.73M2 — LOW
GFR/BSA.PRED SERPLBLD CYS-BASED-ARV: 28 ML/MIN/1.73M2 — LOW
GFR/BSA.PRED SERPLBLD CYS-BASED-ARV: 28 ML/MIN/1.73M2 — LOW
GFR/BSA.PRED SERPLBLD CYS-BASED-ARV: 29 ML/MIN/1.73M2 — LOW
GFR/BSA.PRED SERPLBLD CYS-BASED-ARV: 35 ML/MIN/1.73M2 — LOW
GFR/BSA.PRED SERPLBLD CYS-BASED-ARV: 35 ML/MIN/1.73M2 — LOW
GIANT PLATELETS BLD QL SMEAR: PRESENT — SIGNIFICANT CHANGE UP
GLUCOSE BLDA-MCNC: 144 MG/DL — HIGH (ref 70–99)
GLUCOSE BLDA-MCNC: 158 MG/DL — HIGH (ref 70–99)
GLUCOSE BLDA-MCNC: 80 MG/DL — SIGNIFICANT CHANGE UP (ref 70–99)
GLUCOSE BLDA-MCNC: 85 MG/DL — SIGNIFICANT CHANGE UP (ref 70–99)
GLUCOSE BLDA-MCNC: 93 MG/DL — SIGNIFICANT CHANGE UP (ref 70–99)
GLUCOSE BLDA-MCNC: 99 MG/DL — SIGNIFICANT CHANGE UP (ref 70–99)
GLUCOSE BLDC GLUCOMTR-MCNC: 100 MG/DL — HIGH (ref 70–99)
GLUCOSE BLDC GLUCOMTR-MCNC: 101 MG/DL — HIGH (ref 70–99)
GLUCOSE BLDC GLUCOMTR-MCNC: 102 MG/DL — HIGH (ref 70–99)
GLUCOSE BLDC GLUCOMTR-MCNC: 102 MG/DL — HIGH (ref 70–99)
GLUCOSE BLDC GLUCOMTR-MCNC: 103 MG/DL — HIGH (ref 70–99)
GLUCOSE BLDC GLUCOMTR-MCNC: 104 MG/DL — HIGH (ref 70–99)
GLUCOSE BLDC GLUCOMTR-MCNC: 105 MG/DL — HIGH (ref 70–99)
GLUCOSE BLDC GLUCOMTR-MCNC: 106 MG/DL — HIGH (ref 70–99)
GLUCOSE BLDC GLUCOMTR-MCNC: 107 MG/DL — HIGH (ref 70–99)
GLUCOSE BLDC GLUCOMTR-MCNC: 108 MG/DL — HIGH (ref 70–99)
GLUCOSE BLDC GLUCOMTR-MCNC: 109 MG/DL — HIGH (ref 70–99)
GLUCOSE BLDC GLUCOMTR-MCNC: 110 MG/DL — HIGH (ref 70–99)
GLUCOSE BLDC GLUCOMTR-MCNC: 111 MG/DL — HIGH (ref 70–99)
GLUCOSE BLDC GLUCOMTR-MCNC: 112 MG/DL — HIGH (ref 70–99)
GLUCOSE BLDC GLUCOMTR-MCNC: 113 MG/DL — HIGH (ref 70–99)
GLUCOSE BLDC GLUCOMTR-MCNC: 114 MG/DL — HIGH (ref 70–99)
GLUCOSE BLDC GLUCOMTR-MCNC: 115 MG/DL — HIGH (ref 70–99)
GLUCOSE BLDC GLUCOMTR-MCNC: 116 MG/DL — HIGH (ref 70–99)
GLUCOSE BLDC GLUCOMTR-MCNC: 117 MG/DL — HIGH (ref 70–99)
GLUCOSE BLDC GLUCOMTR-MCNC: 118 MG/DL — HIGH (ref 70–99)
GLUCOSE BLDC GLUCOMTR-MCNC: 119 MG/DL — HIGH (ref 70–99)
GLUCOSE BLDC GLUCOMTR-MCNC: 120 MG/DL — HIGH (ref 70–99)
GLUCOSE BLDC GLUCOMTR-MCNC: 121 MG/DL — HIGH (ref 70–99)
GLUCOSE BLDC GLUCOMTR-MCNC: 122 MG/DL — HIGH (ref 70–99)
GLUCOSE BLDC GLUCOMTR-MCNC: 122 MG/DL — HIGH (ref 70–99)
GLUCOSE BLDC GLUCOMTR-MCNC: 123 MG/DL — HIGH (ref 70–99)
GLUCOSE BLDC GLUCOMTR-MCNC: 124 MG/DL — HIGH (ref 70–99)
GLUCOSE BLDC GLUCOMTR-MCNC: 125 MG/DL — HIGH (ref 70–99)
GLUCOSE BLDC GLUCOMTR-MCNC: 126 MG/DL — HIGH (ref 70–99)
GLUCOSE BLDC GLUCOMTR-MCNC: 126 MG/DL — HIGH (ref 70–99)
GLUCOSE BLDC GLUCOMTR-MCNC: 127 MG/DL — HIGH (ref 70–99)
GLUCOSE BLDC GLUCOMTR-MCNC: 128 MG/DL — HIGH (ref 70–99)
GLUCOSE BLDC GLUCOMTR-MCNC: 129 MG/DL — HIGH (ref 70–99)
GLUCOSE BLDC GLUCOMTR-MCNC: 130 MG/DL — HIGH (ref 70–99)
GLUCOSE BLDC GLUCOMTR-MCNC: 131 MG/DL — HIGH (ref 70–99)
GLUCOSE BLDC GLUCOMTR-MCNC: 132 MG/DL — HIGH (ref 70–99)
GLUCOSE BLDC GLUCOMTR-MCNC: 133 MG/DL — HIGH (ref 70–99)
GLUCOSE BLDC GLUCOMTR-MCNC: 134 MG/DL — HIGH (ref 70–99)
GLUCOSE BLDC GLUCOMTR-MCNC: 134 MG/DL — HIGH (ref 70–99)
GLUCOSE BLDC GLUCOMTR-MCNC: 135 MG/DL — HIGH (ref 70–99)
GLUCOSE BLDC GLUCOMTR-MCNC: 136 MG/DL — HIGH (ref 70–99)
GLUCOSE BLDC GLUCOMTR-MCNC: 136 MG/DL — HIGH (ref 70–99)
GLUCOSE BLDC GLUCOMTR-MCNC: 137 MG/DL — HIGH (ref 70–99)
GLUCOSE BLDC GLUCOMTR-MCNC: 138 MG/DL — HIGH (ref 70–99)
GLUCOSE BLDC GLUCOMTR-MCNC: 139 MG/DL — HIGH (ref 70–99)
GLUCOSE BLDC GLUCOMTR-MCNC: 140 MG/DL — HIGH (ref 70–99)
GLUCOSE BLDC GLUCOMTR-MCNC: 142 MG/DL — HIGH (ref 70–99)
GLUCOSE BLDC GLUCOMTR-MCNC: 143 MG/DL — HIGH (ref 70–99)
GLUCOSE BLDC GLUCOMTR-MCNC: 145 MG/DL — HIGH (ref 70–99)
GLUCOSE BLDC GLUCOMTR-MCNC: 146 MG/DL — HIGH (ref 70–99)
GLUCOSE BLDC GLUCOMTR-MCNC: 147 MG/DL — HIGH (ref 70–99)
GLUCOSE BLDC GLUCOMTR-MCNC: 149 MG/DL — HIGH (ref 70–99)
GLUCOSE BLDC GLUCOMTR-MCNC: 150 MG/DL — HIGH (ref 70–99)
GLUCOSE BLDC GLUCOMTR-MCNC: 151 MG/DL — HIGH (ref 70–99)
GLUCOSE BLDC GLUCOMTR-MCNC: 154 MG/DL — HIGH (ref 70–99)
GLUCOSE BLDC GLUCOMTR-MCNC: 154 MG/DL — HIGH (ref 70–99)
GLUCOSE BLDC GLUCOMTR-MCNC: 155 MG/DL — HIGH (ref 70–99)
GLUCOSE BLDC GLUCOMTR-MCNC: 158 MG/DL — HIGH (ref 70–99)
GLUCOSE BLDC GLUCOMTR-MCNC: 160 MG/DL — HIGH (ref 70–99)
GLUCOSE BLDC GLUCOMTR-MCNC: 162 MG/DL — HIGH (ref 70–99)
GLUCOSE BLDC GLUCOMTR-MCNC: 162 MG/DL — HIGH (ref 70–99)
GLUCOSE BLDC GLUCOMTR-MCNC: 168 MG/DL — HIGH (ref 70–99)
GLUCOSE BLDC GLUCOMTR-MCNC: 169 MG/DL — HIGH (ref 70–99)
GLUCOSE BLDC GLUCOMTR-MCNC: 218 MG/DL — HIGH (ref 70–99)
GLUCOSE BLDC GLUCOMTR-MCNC: 240 MG/DL — HIGH (ref 70–99)
GLUCOSE BLDC GLUCOMTR-MCNC: 254 MG/DL — HIGH (ref 70–99)
GLUCOSE BLDC GLUCOMTR-MCNC: 46 MG/DL — CRITICAL LOW (ref 70–99)
GLUCOSE BLDC GLUCOMTR-MCNC: 47 MG/DL — CRITICAL LOW (ref 70–99)
GLUCOSE BLDC GLUCOMTR-MCNC: 48 MG/DL — CRITICAL LOW (ref 70–99)
GLUCOSE BLDC GLUCOMTR-MCNC: 48 MG/DL — CRITICAL LOW (ref 70–99)
GLUCOSE BLDC GLUCOMTR-MCNC: 49 MG/DL — CRITICAL LOW (ref 70–99)
GLUCOSE BLDC GLUCOMTR-MCNC: 53 MG/DL — CRITICAL LOW (ref 70–99)
GLUCOSE BLDC GLUCOMTR-MCNC: 53 MG/DL — CRITICAL LOW (ref 70–99)
GLUCOSE BLDC GLUCOMTR-MCNC: 57 MG/DL — LOW (ref 70–99)
GLUCOSE BLDC GLUCOMTR-MCNC: 59 MG/DL — LOW (ref 70–99)
GLUCOSE BLDC GLUCOMTR-MCNC: 61 MG/DL — LOW (ref 70–99)
GLUCOSE BLDC GLUCOMTR-MCNC: 63 MG/DL — LOW (ref 70–99)
GLUCOSE BLDC GLUCOMTR-MCNC: 64 MG/DL — LOW (ref 70–99)
GLUCOSE BLDC GLUCOMTR-MCNC: 65 MG/DL — LOW (ref 70–99)
GLUCOSE BLDC GLUCOMTR-MCNC: 68 MG/DL — LOW (ref 70–99)
GLUCOSE BLDC GLUCOMTR-MCNC: 69 MG/DL — LOW (ref 70–99)
GLUCOSE BLDC GLUCOMTR-MCNC: 69 MG/DL — LOW (ref 70–99)
GLUCOSE BLDC GLUCOMTR-MCNC: 70 MG/DL — SIGNIFICANT CHANGE UP (ref 70–99)
GLUCOSE BLDC GLUCOMTR-MCNC: 71 MG/DL — SIGNIFICANT CHANGE UP (ref 70–99)
GLUCOSE BLDC GLUCOMTR-MCNC: 72 MG/DL — SIGNIFICANT CHANGE UP (ref 70–99)
GLUCOSE BLDC GLUCOMTR-MCNC: 73 MG/DL — SIGNIFICANT CHANGE UP (ref 70–99)
GLUCOSE BLDC GLUCOMTR-MCNC: 74 MG/DL — SIGNIFICANT CHANGE UP (ref 70–99)
GLUCOSE BLDC GLUCOMTR-MCNC: 75 MG/DL — SIGNIFICANT CHANGE UP (ref 70–99)
GLUCOSE BLDC GLUCOMTR-MCNC: 75 MG/DL — SIGNIFICANT CHANGE UP (ref 70–99)
GLUCOSE BLDC GLUCOMTR-MCNC: 76 MG/DL — SIGNIFICANT CHANGE UP (ref 70–99)
GLUCOSE BLDC GLUCOMTR-MCNC: 77 MG/DL — SIGNIFICANT CHANGE UP (ref 70–99)
GLUCOSE BLDC GLUCOMTR-MCNC: 78 MG/DL — SIGNIFICANT CHANGE UP (ref 70–99)
GLUCOSE BLDC GLUCOMTR-MCNC: 79 MG/DL — SIGNIFICANT CHANGE UP (ref 70–99)
GLUCOSE BLDC GLUCOMTR-MCNC: 80 MG/DL — SIGNIFICANT CHANGE UP (ref 70–99)
GLUCOSE BLDC GLUCOMTR-MCNC: 81 MG/DL — SIGNIFICANT CHANGE UP (ref 70–99)
GLUCOSE BLDC GLUCOMTR-MCNC: 82 MG/DL — SIGNIFICANT CHANGE UP (ref 70–99)
GLUCOSE BLDC GLUCOMTR-MCNC: 83 MG/DL — SIGNIFICANT CHANGE UP (ref 70–99)
GLUCOSE BLDC GLUCOMTR-MCNC: 84 MG/DL — SIGNIFICANT CHANGE UP (ref 70–99)
GLUCOSE BLDC GLUCOMTR-MCNC: 85 MG/DL — SIGNIFICANT CHANGE UP (ref 70–99)
GLUCOSE BLDC GLUCOMTR-MCNC: 85 MG/DL — SIGNIFICANT CHANGE UP (ref 70–99)
GLUCOSE BLDC GLUCOMTR-MCNC: 86 MG/DL — SIGNIFICANT CHANGE UP (ref 70–99)
GLUCOSE BLDC GLUCOMTR-MCNC: 86 MG/DL — SIGNIFICANT CHANGE UP (ref 70–99)
GLUCOSE BLDC GLUCOMTR-MCNC: 87 MG/DL — SIGNIFICANT CHANGE UP (ref 70–99)
GLUCOSE BLDC GLUCOMTR-MCNC: 87 MG/DL — SIGNIFICANT CHANGE UP (ref 70–99)
GLUCOSE BLDC GLUCOMTR-MCNC: 88 MG/DL — SIGNIFICANT CHANGE UP (ref 70–99)
GLUCOSE BLDC GLUCOMTR-MCNC: 89 MG/DL — SIGNIFICANT CHANGE UP (ref 70–99)
GLUCOSE BLDC GLUCOMTR-MCNC: 90 MG/DL — SIGNIFICANT CHANGE UP (ref 70–99)
GLUCOSE BLDC GLUCOMTR-MCNC: 91 MG/DL — SIGNIFICANT CHANGE UP (ref 70–99)
GLUCOSE BLDC GLUCOMTR-MCNC: 92 MG/DL — SIGNIFICANT CHANGE UP (ref 70–99)
GLUCOSE BLDC GLUCOMTR-MCNC: 93 MG/DL — SIGNIFICANT CHANGE UP (ref 70–99)
GLUCOSE BLDC GLUCOMTR-MCNC: 94 MG/DL — SIGNIFICANT CHANGE UP (ref 70–99)
GLUCOSE BLDC GLUCOMTR-MCNC: 95 MG/DL — SIGNIFICANT CHANGE UP (ref 70–99)
GLUCOSE BLDC GLUCOMTR-MCNC: 96 MG/DL — SIGNIFICANT CHANGE UP (ref 70–99)
GLUCOSE BLDC GLUCOMTR-MCNC: 97 MG/DL — SIGNIFICANT CHANGE UP (ref 70–99)
GLUCOSE BLDC GLUCOMTR-MCNC: 98 MG/DL — SIGNIFICANT CHANGE UP (ref 70–99)
GLUCOSE BLDC GLUCOMTR-MCNC: 99 MG/DL — SIGNIFICANT CHANGE UP (ref 70–99)
GLUCOSE BLDC GLUCOMTR-MCNC: 99 MG/DL — SIGNIFICANT CHANGE UP (ref 70–99)
GLUCOSE SERPL-MCNC: 100 MG/DL — HIGH (ref 70–99)
GLUCOSE SERPL-MCNC: 100 MG/DL — HIGH (ref 70–99)
GLUCOSE SERPL-MCNC: 101 MG/DL — HIGH (ref 70–99)
GLUCOSE SERPL-MCNC: 103 MG/DL — HIGH (ref 70–99)
GLUCOSE SERPL-MCNC: 104 MG/DL — HIGH (ref 70–99)
GLUCOSE SERPL-MCNC: 104 MG/DL — HIGH (ref 70–99)
GLUCOSE SERPL-MCNC: 105 MG/DL — HIGH (ref 70–99)
GLUCOSE SERPL-MCNC: 106 MG/DL — HIGH (ref 70–99)
GLUCOSE SERPL-MCNC: 107 MG/DL — HIGH (ref 70–99)
GLUCOSE SERPL-MCNC: 108 MG/DL — HIGH (ref 70–99)
GLUCOSE SERPL-MCNC: 109 MG/DL — HIGH (ref 70–99)
GLUCOSE SERPL-MCNC: 109 MG/DL — HIGH (ref 70–99)
GLUCOSE SERPL-MCNC: 110 MG/DL — HIGH (ref 70–99)
GLUCOSE SERPL-MCNC: 111 MG/DL — HIGH (ref 70–99)
GLUCOSE SERPL-MCNC: 112 MG/DL — HIGH (ref 70–99)
GLUCOSE SERPL-MCNC: 113 MG/DL — HIGH (ref 70–99)
GLUCOSE SERPL-MCNC: 114 MG/DL — HIGH (ref 70–99)
GLUCOSE SERPL-MCNC: 115 MG/DL — HIGH (ref 70–99)
GLUCOSE SERPL-MCNC: 117 MG/DL — HIGH (ref 70–99)
GLUCOSE SERPL-MCNC: 118 MG/DL — HIGH (ref 70–99)
GLUCOSE SERPL-MCNC: 119 MG/DL — HIGH (ref 70–99)
GLUCOSE SERPL-MCNC: 120 MG/DL — HIGH (ref 70–99)
GLUCOSE SERPL-MCNC: 121 MG/DL — HIGH (ref 70–99)
GLUCOSE SERPL-MCNC: 122 MG/DL — HIGH (ref 70–99)
GLUCOSE SERPL-MCNC: 123 MG/DL — HIGH (ref 70–99)
GLUCOSE SERPL-MCNC: 124 MG/DL — HIGH (ref 70–99)
GLUCOSE SERPL-MCNC: 125 MG/DL — HIGH (ref 70–99)
GLUCOSE SERPL-MCNC: 125 MG/DL — HIGH (ref 70–99)
GLUCOSE SERPL-MCNC: 126 MG/DL — HIGH (ref 70–99)
GLUCOSE SERPL-MCNC: 127 MG/DL — HIGH (ref 70–99)
GLUCOSE SERPL-MCNC: 128 MG/DL — HIGH (ref 70–99)
GLUCOSE SERPL-MCNC: 129 MG/DL — HIGH (ref 70–99)
GLUCOSE SERPL-MCNC: 130 MG/DL — HIGH (ref 70–99)
GLUCOSE SERPL-MCNC: 131 MG/DL — HIGH (ref 70–99)
GLUCOSE SERPL-MCNC: 132 MG/DL — HIGH (ref 70–99)
GLUCOSE SERPL-MCNC: 133 MG/DL — HIGH (ref 70–99)
GLUCOSE SERPL-MCNC: 134 MG/DL — HIGH (ref 70–99)
GLUCOSE SERPL-MCNC: 135 MG/DL — HIGH (ref 70–99)
GLUCOSE SERPL-MCNC: 136 MG/DL — HIGH (ref 70–99)
GLUCOSE SERPL-MCNC: 137 MG/DL — HIGH (ref 70–99)
GLUCOSE SERPL-MCNC: 137 MG/DL — HIGH (ref 70–99)
GLUCOSE SERPL-MCNC: 138 MG/DL — HIGH (ref 70–99)
GLUCOSE SERPL-MCNC: 139 MG/DL — HIGH (ref 70–99)
GLUCOSE SERPL-MCNC: 139 MG/DL — HIGH (ref 70–99)
GLUCOSE SERPL-MCNC: 143 MG/DL — HIGH (ref 70–99)
GLUCOSE SERPL-MCNC: 144 MG/DL — HIGH (ref 70–99)
GLUCOSE SERPL-MCNC: 144 MG/DL — HIGH (ref 70–99)
GLUCOSE SERPL-MCNC: 147 MG/DL — HIGH (ref 70–99)
GLUCOSE SERPL-MCNC: 147 MG/DL — HIGH (ref 70–99)
GLUCOSE SERPL-MCNC: 148 MG/DL — HIGH (ref 70–99)
GLUCOSE SERPL-MCNC: 148 MG/DL — HIGH (ref 70–99)
GLUCOSE SERPL-MCNC: 151 MG/DL — HIGH (ref 70–99)
GLUCOSE SERPL-MCNC: 151 MG/DL — HIGH (ref 70–99)
GLUCOSE SERPL-MCNC: 157 MG/DL — HIGH (ref 70–99)
GLUCOSE SERPL-MCNC: 185 MG/DL — HIGH (ref 70–99)
GLUCOSE SERPL-MCNC: 293 MG/DL — HIGH (ref 70–99)
GLUCOSE SERPL-MCNC: 293 MG/DL — HIGH (ref 70–99)
GLUCOSE SERPL-MCNC: 53 MG/DL — CRITICAL LOW (ref 70–99)
GLUCOSE SERPL-MCNC: 53 MG/DL — CRITICAL LOW (ref 70–99)
GLUCOSE SERPL-MCNC: 65 MG/DL — LOW (ref 70–99)
GLUCOSE SERPL-MCNC: 68 MG/DL — LOW (ref 70–99)
GLUCOSE SERPL-MCNC: 72 MG/DL — SIGNIFICANT CHANGE UP (ref 70–99)
GLUCOSE SERPL-MCNC: 80 MG/DL — SIGNIFICANT CHANGE UP (ref 70–99)
GLUCOSE SERPL-MCNC: 82 MG/DL — SIGNIFICANT CHANGE UP (ref 70–99)
GLUCOSE SERPL-MCNC: 82 MG/DL — SIGNIFICANT CHANGE UP (ref 70–99)
GLUCOSE SERPL-MCNC: 83 MG/DL — SIGNIFICANT CHANGE UP (ref 70–99)
GLUCOSE SERPL-MCNC: 84 MG/DL — SIGNIFICANT CHANGE UP (ref 70–99)
GLUCOSE SERPL-MCNC: 85 MG/DL — SIGNIFICANT CHANGE UP (ref 70–99)
GLUCOSE SERPL-MCNC: 85 MG/DL — SIGNIFICANT CHANGE UP (ref 70–99)
GLUCOSE SERPL-MCNC: 86 MG/DL — SIGNIFICANT CHANGE UP (ref 70–99)
GLUCOSE SERPL-MCNC: 87 MG/DL — SIGNIFICANT CHANGE UP (ref 70–99)
GLUCOSE SERPL-MCNC: 87 MG/DL — SIGNIFICANT CHANGE UP (ref 70–99)
GLUCOSE SERPL-MCNC: 88 MG/DL — SIGNIFICANT CHANGE UP (ref 70–99)
GLUCOSE SERPL-MCNC: 89 MG/DL — SIGNIFICANT CHANGE UP (ref 70–99)
GLUCOSE SERPL-MCNC: 91 MG/DL — SIGNIFICANT CHANGE UP (ref 70–99)
GLUCOSE SERPL-MCNC: 92 MG/DL — SIGNIFICANT CHANGE UP (ref 70–99)
GLUCOSE SERPL-MCNC: 94 MG/DL — SIGNIFICANT CHANGE UP (ref 70–99)
GLUCOSE SERPL-MCNC: 96 MG/DL — SIGNIFICANT CHANGE UP (ref 70–99)
GLUCOSE SERPL-MCNC: 97 MG/DL — SIGNIFICANT CHANGE UP (ref 70–99)
GLUCOSE SERPL-MCNC: 98 MG/DL — SIGNIFICANT CHANGE UP (ref 70–99)
GLUCOSE SERPL-MCNC: 99 MG/DL — SIGNIFICANT CHANGE UP (ref 70–99)
GLUCOSE SERPL-MCNC: SIGNIFICANT CHANGE UP (ref 70–99)
GLUCOSE UR QL: NEGATIVE MG/DL — SIGNIFICANT CHANGE UP
GLUCOSE UR QL: NEGATIVE — SIGNIFICANT CHANGE UP
GP B STREP DNA BLD POS QL NAA+NON-PROBE: SIGNIFICANT CHANGE UP
GP B STREP DNA BLD POS QL NAA+NON-PROBE: SIGNIFICANT CHANGE UP
GRAM STN FLD: ABNORMAL
GRAM STN FLD: SIGNIFICANT CHANGE UP
GRAN CASTS # UR COMP ASSIST: ABNORMAL /LPF
HAEM INFLU DNA BLD POS QL NAA+NON-PROBE: SIGNIFICANT CHANGE UP
HAEM INFLU DNA BLD POS QL NAA+NON-PROBE: SIGNIFICANT CHANGE UP
HAPTOGLOB SERPL-MCNC: 116 MG/DL — SIGNIFICANT CHANGE UP (ref 34–200)
HAPTOGLOB SERPL-MCNC: 116 MG/DL — SIGNIFICANT CHANGE UP (ref 34–200)
HAPTOGLOB SERPL-MCNC: 68 MG/DL — SIGNIFICANT CHANGE UP (ref 34–200)
HAPTOGLOB SERPL-MCNC: 68 MG/DL — SIGNIFICANT CHANGE UP (ref 34–200)
HAV IGG SER QL IA: REACTIVE
HAV IGG SER QL IA: REACTIVE
HAV IGM SER-ACNC: SIGNIFICANT CHANGE UP
HBV CORE AB SER-ACNC: SIGNIFICANT CHANGE UP
HBV CORE AB SER-ACNC: SIGNIFICANT CHANGE UP
HBV CORE IGM SER-ACNC: SIGNIFICANT CHANGE UP
HBV SURFACE AB SER-ACNC: REACTIVE
HBV SURFACE AG SER-ACNC: SIGNIFICANT CHANGE UP
HCO3 BLDA-SCNC: 20 MMOL/L — LOW (ref 21–28)
HCO3 BLDA-SCNC: 21 MMOL/L — SIGNIFICANT CHANGE UP (ref 21–28)
HCO3 BLDA-SCNC: 21 MMOL/L — SIGNIFICANT CHANGE UP (ref 21–28)
HCO3 BLDA-SCNC: 22 MMOL/L — SIGNIFICANT CHANGE UP (ref 21–28)
HCO3 BLDA-SCNC: 22 MMOL/L — SIGNIFICANT CHANGE UP (ref 21–28)
HCO3 BLDA-SCNC: 23 MMOL/L — SIGNIFICANT CHANGE UP (ref 21–28)
HCO3 BLDA-SCNC: 24 MMOL/L — SIGNIFICANT CHANGE UP (ref 21–28)
HCO3 BLDA-SCNC: 24 MMOL/L — SIGNIFICANT CHANGE UP (ref 21–28)
HCO3 BLDA-SCNC: 25 MMOL/L — SIGNIFICANT CHANGE UP (ref 21–28)
HCO3 BLDA-SCNC: 26 MMOL/L — SIGNIFICANT CHANGE UP (ref 21–28)
HCO3 BLDA-SCNC: 26 MMOL/L — SIGNIFICANT CHANGE UP (ref 21–28)
HCO3 BLDA-SCNC: 27 MMOL/L — SIGNIFICANT CHANGE UP (ref 21–28)
HCO3 BLDA-SCNC: 28 MMOL/L — SIGNIFICANT CHANGE UP (ref 21–28)
HCO3 BLDV-SCNC: 23 MMOL/L — SIGNIFICANT CHANGE UP (ref 22–29)
HCT VFR BLD CALC: 22.7 % — LOW (ref 39–50)
HCT VFR BLD CALC: 22.9 % — LOW (ref 39–50)
HCT VFR BLD CALC: 22.9 % — LOW (ref 39–50)
HCT VFR BLD CALC: 23.2 % — LOW (ref 39–50)
HCT VFR BLD CALC: 23.3 % — LOW (ref 39–50)
HCT VFR BLD CALC: 23.3 % — LOW (ref 39–50)
HCT VFR BLD CALC: 23.4 % — LOW (ref 39–50)
HCT VFR BLD CALC: 23.5 % — LOW (ref 39–50)
HCT VFR BLD CALC: 23.5 % — LOW (ref 39–50)
HCT VFR BLD CALC: 23.6 % — LOW (ref 39–50)
HCT VFR BLD CALC: 23.9 % — LOW (ref 39–50)
HCT VFR BLD CALC: 23.9 % — LOW (ref 39–50)
HCT VFR BLD CALC: 24 % — LOW (ref 39–50)
HCT VFR BLD CALC: 24.1 % — LOW (ref 39–50)
HCT VFR BLD CALC: 24.3 % — LOW (ref 39–50)
HCT VFR BLD CALC: 24.4 % — LOW (ref 39–50)
HCT VFR BLD CALC: 24.7 % — LOW (ref 39–50)
HCT VFR BLD CALC: 24.8 % — LOW (ref 39–50)
HCT VFR BLD CALC: 24.9 % — LOW (ref 39–50)
HCT VFR BLD CALC: 24.9 % — LOW (ref 39–50)
HCT VFR BLD CALC: 25 % — LOW (ref 39–50)
HCT VFR BLD CALC: 25 % — LOW (ref 39–50)
HCT VFR BLD CALC: 25.1 % — LOW (ref 39–50)
HCT VFR BLD CALC: 25.1 % — LOW (ref 39–50)
HCT VFR BLD CALC: 25.2 % — LOW (ref 39–50)
HCT VFR BLD CALC: 25.3 % — LOW (ref 39–50)
HCT VFR BLD CALC: 25.4 % — LOW (ref 39–50)
HCT VFR BLD CALC: 25.5 % — LOW (ref 39–50)
HCT VFR BLD CALC: 25.6 % — LOW (ref 39–50)
HCT VFR BLD CALC: 25.7 % — LOW (ref 39–50)
HCT VFR BLD CALC: 25.8 % — LOW (ref 39–50)
HCT VFR BLD CALC: 25.9 % — LOW (ref 39–50)
HCT VFR BLD CALC: 26 % — LOW (ref 39–50)
HCT VFR BLD CALC: 26.1 % — LOW (ref 39–50)
HCT VFR BLD CALC: 26.2 % — LOW (ref 39–50)
HCT VFR BLD CALC: 26.3 % — LOW (ref 39–50)
HCT VFR BLD CALC: 26.4 % — LOW (ref 39–50)
HCT VFR BLD CALC: 26.5 % — LOW (ref 39–50)
HCT VFR BLD CALC: 26.6 % — LOW (ref 39–50)
HCT VFR BLD CALC: 26.7 % — LOW (ref 39–50)
HCT VFR BLD CALC: 26.8 % — LOW (ref 39–50)
HCT VFR BLD CALC: 26.9 % — LOW (ref 39–50)
HCT VFR BLD CALC: 27 % — LOW (ref 39–50)
HCT VFR BLD CALC: 27.1 % — LOW (ref 39–50)
HCT VFR BLD CALC: 27.2 % — LOW (ref 39–50)
HCT VFR BLD CALC: 27.3 % — LOW (ref 39–50)
HCT VFR BLD CALC: 27.5 % — LOW (ref 39–50)
HCT VFR BLD CALC: 27.5 % — LOW (ref 39–50)
HCT VFR BLD CALC: 27.6 % — LOW (ref 39–50)
HCT VFR BLD CALC: 27.7 % — LOW (ref 39–50)
HCT VFR BLD CALC: 27.8 % — LOW (ref 39–50)
HCT VFR BLD CALC: 27.9 % — LOW (ref 39–50)
HCT VFR BLD CALC: 28 % — LOW (ref 39–50)
HCT VFR BLD CALC: 28 % — LOW (ref 39–50)
HCT VFR BLD CALC: 28.1 % — LOW (ref 39–50)
HCT VFR BLD CALC: 28.2 % — LOW (ref 39–50)
HCT VFR BLD CALC: 28.3 % — LOW (ref 39–50)
HCT VFR BLD CALC: 28.3 % — LOW (ref 39–50)
HCT VFR BLD CALC: 28.4 % — LOW (ref 39–50)
HCT VFR BLD CALC: 28.5 % — LOW (ref 39–50)
HCT VFR BLD CALC: 28.6 % — LOW (ref 39–50)
HCT VFR BLD CALC: 28.7 % — LOW (ref 39–50)
HCT VFR BLD CALC: 28.8 % — LOW (ref 39–50)
HCT VFR BLD CALC: 28.9 % — LOW (ref 39–50)
HCT VFR BLD CALC: 29 % — LOW (ref 39–50)
HCT VFR BLD CALC: 29.2 % — LOW (ref 39–50)
HCT VFR BLD CALC: 29.3 % — LOW (ref 39–50)
HCT VFR BLD CALC: 29.4 % — LOW (ref 39–50)
HCT VFR BLD CALC: 29.5 % — LOW (ref 39–50)
HCT VFR BLD CALC: 29.6 % — LOW (ref 39–50)
HCT VFR BLD CALC: 29.6 % — LOW (ref 39–50)
HCT VFR BLD CALC: 29.7 % — LOW (ref 39–50)
HCT VFR BLD CALC: 29.8 % — LOW (ref 39–50)
HCT VFR BLD CALC: 30 % — LOW (ref 39–50)
HCT VFR BLD CALC: 30.4 % — LOW (ref 39–50)
HCT VFR BLD CALC: 30.7 % — LOW (ref 39–50)
HCT VFR BLD CALC: 30.8 % — LOW (ref 39–50)
HCT VFR BLD CALC: 30.9 % — LOW (ref 39–50)
HCT VFR BLD CALC: 31 % — LOW (ref 39–50)
HCT VFR BLD CALC: 31 % — LOW (ref 39–50)
HCT VFR BLD CALC: 31.1 % — LOW (ref 39–50)
HCT VFR BLD CALC: 31.2 % — LOW (ref 39–50)
HCT VFR BLD CALC: 31.3 % — LOW (ref 39–50)
HCT VFR BLD CALC: 31.7 % — LOW (ref 39–50)
HCT VFR BLD CALC: 32 % — LOW (ref 39–50)
HCT VFR BLD CALC: 32.4 % — LOW (ref 39–50)
HCT VFR BLD CALC: 32.7 % — LOW (ref 39–50)
HCT VFR BLD CALC: 33 % — LOW (ref 39–50)
HCT VFR BLD CALC: 33.4 % — LOW (ref 39–50)
HCT VFR BLD CALC: 33.6 % — LOW (ref 39–50)
HCT VFR BLD CALC: 33.7 % — LOW (ref 39–50)
HCT VFR BLD CALC: 39.6 % — SIGNIFICANT CHANGE UP (ref 39–50)
HCT VFR BLD CALC: 39.6 % — SIGNIFICANT CHANGE UP (ref 39–50)
HCT VFR BLD CALC: 39.8 % — SIGNIFICANT CHANGE UP (ref 39–50)
HCT VFR BLD CALC: 40.8 % — SIGNIFICANT CHANGE UP (ref 39–50)
HCV AB S/CO SERPL IA: 0.04 S/CO — SIGNIFICANT CHANGE UP
HCV AB S/CO SERPL IA: 0.05 S/CO — SIGNIFICANT CHANGE UP
HCV AB S/CO SERPL IA: 0.05 S/CO — SIGNIFICANT CHANGE UP
HCV AB SERPL-IMP: SIGNIFICANT CHANGE UP
HDLC SERPL-MCNC: 12 MG/DL — LOW
HDLC SERPL-MCNC: 15 MG/DL — LOW
HDLC SERPL-MCNC: 7 MG/DL — LOW
HDLC SERPL-MCNC: 7 MG/DL — LOW
HDLC SERPL-MCNC: 8 MG/DL — LOW
HDLC SERPL-MCNC: 8 MG/DL — LOW
HDLC SERPL-MCNC: 9 MG/DL — LOW
HDLC SERPL-MCNC: 9 MG/DL — LOW
HGB BLD-MCNC: 10 G/DL — LOW (ref 13–17)
HGB BLD-MCNC: 10.1 G/DL — LOW (ref 13–17)
HGB BLD-MCNC: 10.2 G/DL — LOW (ref 13–17)
HGB BLD-MCNC: 10.2 G/DL — LOW (ref 13–17)
HGB BLD-MCNC: 10.4 G/DL — LOW (ref 13–17)
HGB BLD-MCNC: 10.5 G/DL — LOW (ref 13–17)
HGB BLD-MCNC: 10.7 G/DL — LOW (ref 13–17)
HGB BLD-MCNC: 10.7 G/DL — LOW (ref 13–17)
HGB BLD-MCNC: 12.5 G/DL — LOW (ref 13–17)
HGB BLD-MCNC: 12.8 G/DL — LOW (ref 13–17)
HGB BLD-MCNC: 13 G/DL — SIGNIFICANT CHANGE UP (ref 13–17)
HGB BLD-MCNC: 13.2 G/DL — SIGNIFICANT CHANGE UP (ref 13–17)
HGB BLD-MCNC: 6.2 G/DL — CRITICAL LOW (ref 13–17)
HGB BLD-MCNC: 6.2 G/DL — CRITICAL LOW (ref 13–17)
HGB BLD-MCNC: 6.4 G/DL — CRITICAL LOW (ref 13–17)
HGB BLD-MCNC: 6.4 G/DL — CRITICAL LOW (ref 13–17)
HGB BLD-MCNC: 7.1 G/DL — LOW (ref 13–17)
HGB BLD-MCNC: 7.2 G/DL — LOW (ref 13–17)
HGB BLD-MCNC: 7.2 G/DL — LOW (ref 13–17)
HGB BLD-MCNC: 7.3 G/DL — LOW (ref 13–17)
HGB BLD-MCNC: 7.4 G/DL — LOW (ref 13–17)
HGB BLD-MCNC: 7.5 G/DL — LOW (ref 13–17)
HGB BLD-MCNC: 7.7 G/DL — LOW (ref 13–17)
HGB BLD-MCNC: 7.8 G/DL — LOW (ref 13–17)
HGB BLD-MCNC: 7.9 G/DL — LOW (ref 13–17)
HGB BLD-MCNC: 8 G/DL — LOW (ref 13–17)
HGB BLD-MCNC: 8.1 G/DL — LOW (ref 13–17)
HGB BLD-MCNC: 8.2 G/DL — LOW (ref 13–17)
HGB BLD-MCNC: 8.3 G/DL — LOW (ref 13–17)
HGB BLD-MCNC: 8.4 G/DL — LOW (ref 13–17)
HGB BLD-MCNC: 8.5 G/DL — LOW (ref 13–17)
HGB BLD-MCNC: 8.6 G/DL — LOW (ref 13–17)
HGB BLD-MCNC: 8.7 G/DL — LOW (ref 13–17)
HGB BLD-MCNC: 8.8 G/DL — LOW (ref 13–17)
HGB BLD-MCNC: 8.9 G/DL — LOW (ref 13–17)
HGB BLD-MCNC: 9 G/DL — LOW (ref 13–17)
HGB BLD-MCNC: 9.1 G/DL — LOW (ref 13–17)
HGB BLD-MCNC: 9.2 G/DL — LOW (ref 13–17)
HGB BLD-MCNC: 9.3 G/DL — LOW (ref 13–17)
HGB BLD-MCNC: 9.4 G/DL — LOW (ref 13–17)
HGB BLD-MCNC: 9.5 G/DL — LOW (ref 13–17)
HGB BLD-MCNC: 9.5 G/DL — LOW (ref 13–17)
HGB BLD-MCNC: 9.6 G/DL — LOW (ref 13–17)
HGB BLD-MCNC: 9.7 G/DL — LOW (ref 13–17)
HGB BLD-MCNC: 9.8 G/DL — LOW (ref 13–17)
HGB BLD-MCNC: 9.9 G/DL — LOW (ref 13–17)
HGB BLDA-MCNC: 10.3 G/DL — LOW (ref 12.6–17.4)
HGB BLDA-MCNC: 10.5 G/DL — LOW (ref 12.6–17.4)
HGB BLDA-MCNC: 8.4 G/DL — LOW (ref 12.6–17.4)
HGB BLDA-MCNC: 8.6 G/DL — LOW (ref 12.6–17.4)
HGB BLDA-MCNC: 9.3 G/DL — LOW (ref 12.6–17.4)
HGB BLDA-MCNC: 9.7 G/DL — LOW (ref 12.6–17.4)
HIV 1+2 AB+HIV1 P24 AG SERPL QL IA: SIGNIFICANT CHANGE UP
HOROWITZ INDEX BLDA+IHG-RTO: 40 — SIGNIFICANT CHANGE UP
HYALINE CASTS # UR AUTO: ABNORMAL /LPF (ref 0–2)
HYALINE CASTS # UR AUTO: SIGNIFICANT CHANGE UP /LPF (ref 0–2)
HYPOCHROMIA BLD QL: SIGNIFICANT CHANGE UP
HYPOCHROMIA BLD QL: SLIGHT — SIGNIFICANT CHANGE UP
IMM GRANULOCYTES NFR BLD AUTO: 0.3 % — SIGNIFICANT CHANGE UP (ref 0–0.9)
IMM GRANULOCYTES NFR BLD AUTO: 0.4 % — SIGNIFICANT CHANGE UP (ref 0–0.9)
IMM GRANULOCYTES NFR BLD AUTO: 0.6 % — SIGNIFICANT CHANGE UP (ref 0–0.9)
IMM GRANULOCYTES NFR BLD AUTO: 0.7 % — SIGNIFICANT CHANGE UP (ref 0–0.9)
IMM GRANULOCYTES NFR BLD AUTO: 0.8 % — SIGNIFICANT CHANGE UP (ref 0–0.9)
IMM GRANULOCYTES NFR BLD AUTO: 0.9 % — SIGNIFICANT CHANGE UP (ref 0–0.9)
IMM GRANULOCYTES NFR BLD AUTO: 1 % — HIGH (ref 0–0.9)
IMM GRANULOCYTES NFR BLD AUTO: 1 % — HIGH (ref 0–0.9)
IMM GRANULOCYTES NFR BLD AUTO: 1.2 % — HIGH (ref 0–0.9)
IMM GRANULOCYTES NFR BLD AUTO: 1.2 % — HIGH (ref 0–0.9)
IMM GRANULOCYTES NFR BLD AUTO: 1.3 % — HIGH (ref 0–0.9)
IMM GRANULOCYTES NFR BLD AUTO: 1.3 % — HIGH (ref 0–0.9)
IMM GRANULOCYTES NFR BLD AUTO: 1.4 % — HIGH (ref 0–0.9)
IMM GRANULOCYTES NFR BLD AUTO: 1.5 % — HIGH (ref 0–0.9)
IMM GRANULOCYTES NFR BLD AUTO: 1.8 % — HIGH (ref 0–0.9)
INR BLD: 1.05 — SIGNIFICANT CHANGE UP (ref 0.88–1.16)
INR BLD: 1.14 — SIGNIFICANT CHANGE UP (ref 0.88–1.16)
INR BLD: 1.17 — SIGNIFICANT CHANGE UP (ref 0.85–1.18)
INR BLD: 1.18 — HIGH (ref 0.88–1.16)
INR BLD: 1.2 — HIGH (ref 0.88–1.16)
INR BLD: 1.21 — HIGH (ref 0.85–1.18)
INR BLD: 1.21 — HIGH (ref 0.85–1.18)
INR BLD: 1.22 — HIGH (ref 0.85–1.18)
INR BLD: 1.22 — HIGH (ref 0.88–1.16)
INR BLD: 1.23 — HIGH (ref 0.85–1.18)
INR BLD: 1.24 — HIGH (ref 0.85–1.18)
INR BLD: 1.26 — HIGH (ref 0.85–1.18)
INR BLD: 1.26 — HIGH (ref 0.85–1.18)
INR BLD: 1.27 — HIGH (ref 0.85–1.18)
INR BLD: 1.27 — HIGH (ref 0.85–1.18)
INR BLD: 1.27 — HIGH (ref 0.88–1.16)
INR BLD: 1.29 — HIGH (ref 0.88–1.16)
INR BLD: 1.3 — HIGH (ref 0.85–1.18)
INR BLD: 1.3 — HIGH (ref 0.85–1.18)
INR BLD: 1.31 — HIGH (ref 0.85–1.18)
INR BLD: 1.33 — HIGH (ref 0.85–1.18)
INR BLD: 1.33 — HIGH (ref 0.85–1.18)
INR BLD: 1.37 — HIGH (ref 0.85–1.18)
INR BLD: 1.37 — HIGH (ref 0.85–1.18)
INR BLD: 1.39 — HIGH (ref 0.85–1.18)
INR BLD: 1.39 — HIGH (ref 0.85–1.18)
INR BLD: 1.4 — HIGH (ref 0.88–1.16)
INR BLD: 1.41 — HIGH (ref 0.85–1.18)
INR BLD: 1.52 — HIGH (ref 0.85–1.18)
INR BLD: 1.52 — HIGH (ref 0.85–1.18)
INR BLD: 1.83 — HIGH (ref 0.85–1.18)
INR BLD: 1.83 — HIGH (ref 0.85–1.18)
INTERPRETATION 24H UR IFE-IMP: SIGNIFICANT CHANGE UP
INTERPRETATION 24H UR IFE-IMP: SIGNIFICANT CHANGE UP
INTERPRETATION SERPL IFE-IMP: SIGNIFICANT CHANGE UP
INTERPRETATION SERPL IFE-IMP: SIGNIFICANT CHANGE UP
IRON SATN MFR SERPL: 43 % — SIGNIFICANT CHANGE UP (ref 16–55)
IRON SATN MFR SERPL: 43 % — SIGNIFICANT CHANGE UP (ref 16–55)
IRON SATN MFR SERPL: 45 % — SIGNIFICANT CHANGE UP (ref 16–55)
IRON SATN MFR SERPL: 45 % — SIGNIFICANT CHANGE UP (ref 16–55)
IRON SATN MFR SERPL: 57 UG/DL — SIGNIFICANT CHANGE UP (ref 45–165)
IRON SATN MFR SERPL: 57 UG/DL — SIGNIFICANT CHANGE UP (ref 45–165)
IRON SATN MFR SERPL: 80 UG/DL — SIGNIFICANT CHANGE UP (ref 45–165)
IRON SATN MFR SERPL: 80 UG/DL — SIGNIFICANT CHANGE UP (ref 45–165)
K OXYTOCA DNA BLD POS QL NAA+NON-PROBE: SIGNIFICANT CHANGE UP
K OXYTOCA DNA BLD POS QL NAA+NON-PROBE: SIGNIFICANT CHANGE UP
KAPPA LC SER QL IFE: 17.55 MG/DL — HIGH (ref 0.33–1.94)
KAPPA LC SER QL IFE: 17.55 MG/DL — HIGH (ref 0.33–1.94)
KAPPA/LAMBDA FREE LIGHT CHAIN RATIO, SERUM: 1.02 RATIO — SIGNIFICANT CHANGE UP (ref 0.26–1.65)
KAPPA/LAMBDA FREE LIGHT CHAIN RATIO, SERUM: 1.02 RATIO — SIGNIFICANT CHANGE UP (ref 0.26–1.65)
KETONES UR-MCNC: ABNORMAL MG/DL
KETONES UR-MCNC: NEGATIVE MG/DL — SIGNIFICANT CHANGE UP
KETONES UR-MCNC: NEGATIVE — SIGNIFICANT CHANGE UP
KLEBSIELLA SP DNA BLD POS QL NAA+NON-PRB: SIGNIFICANT CHANGE UP
KLEBSIELLA SP DNA BLD POS QL NAA+NON-PRB: SIGNIFICANT CHANGE UP
L MONOCYTOG DNA BLD POS QL NAA+NON-PROBE: SIGNIFICANT CHANGE UP
L MONOCYTOG DNA BLD POS QL NAA+NON-PROBE: SIGNIFICANT CHANGE UP
LACTATE SERPL-SCNC: 1 MMOL/L — SIGNIFICANT CHANGE UP (ref 0.5–2)
LACTATE SERPL-SCNC: 1.4 MMOL/L — SIGNIFICANT CHANGE UP (ref 0.5–2)
LACTATE SERPL-SCNC: 1.4 MMOL/L — SIGNIFICANT CHANGE UP (ref 0.5–2)
LACTATE SERPL-SCNC: 1.6 MMOL/L — SIGNIFICANT CHANGE UP (ref 0.5–2)
LACTATE SERPL-SCNC: 1.8 MMOL/L — SIGNIFICANT CHANGE UP (ref 0.5–2)
LACTATE SERPL-SCNC: 1.8 MMOL/L — SIGNIFICANT CHANGE UP (ref 0.5–2)
LACTATE SERPL-SCNC: 2.4 MMOL/L — HIGH (ref 0.5–2)
LACTATE SERPL-SCNC: 3.1 MMOL/L — HIGH (ref 0.5–2)
LACTATE SERPL-SCNC: 3.1 MMOL/L — HIGH (ref 0.5–2)
LACTATE SERPL-SCNC: 3.3 MMOL/L — HIGH (ref 0.5–2)
LACTATE SERPL-SCNC: 3.5 MMOL/L — HIGH (ref 0.5–2)
LACTATE SERPL-SCNC: 3.6 MMOL/L — HIGH (ref 0.5–2)
LACTATE SERPL-SCNC: 3.9 MMOL/L — HIGH (ref 0.5–2)
LACTATE SERPL-SCNC: 4.2 MMOL/L — CRITICAL HIGH (ref 0.5–2)
LACTATE SERPL-SCNC: 4.5 MMOL/L — CRITICAL HIGH (ref 0.5–2)
LAMBDA LC SER QL IFE: 17.2 MG/DL — HIGH (ref 0.57–2.63)
LAMBDA LC SER QL IFE: 17.2 MG/DL — HIGH (ref 0.57–2.63)
LDH SERPL L TO P-CCNC: 150 U/L — SIGNIFICANT CHANGE UP (ref 50–242)
LDH SERPL L TO P-CCNC: 150 U/L — SIGNIFICANT CHANGE UP (ref 50–242)
LDH SERPL L TO P-CCNC: 200 U/L — SIGNIFICANT CHANGE UP (ref 50–242)
LDH SERPL L TO P-CCNC: 200 U/L — SIGNIFICANT CHANGE UP (ref 50–242)
LEUKOCYTE ESTERASE UR-ACNC: ABNORMAL
LEUKOCYTE ESTERASE UR-ACNC: NEGATIVE — SIGNIFICANT CHANGE UP
LIDOCAIN IGE QN: 33 U/L — SIGNIFICANT CHANGE UP (ref 7–60)
LIPID PNL WITH DIRECT LDL SERPL: 103 MG/DL — HIGH
LIPID PNL WITH DIRECT LDL SERPL: 103 MG/DL — HIGH
LIPID PNL WITH DIRECT LDL SERPL: 109 MG/DL — HIGH
LIPID PNL WITH DIRECT LDL SERPL: 126 MG/DL — HIGH
LIPID PNL WITH DIRECT LDL SERPL: 126 MG/DL — HIGH
LIPID PNL WITH DIRECT LDL SERPL: 77 MG/DL — SIGNIFICANT CHANGE UP
LIPID PNL WITH DIRECT LDL SERPL: 91 MG/DL — SIGNIFICANT CHANGE UP
LIPID PNL WITH DIRECT LDL SERPL: 91 MG/DL — SIGNIFICANT CHANGE UP
LKM AB SER-ACNC: <20.1 UNITS — SIGNIFICANT CHANGE UP (ref 0–20)
LKM AB SER-ACNC: <20.1 UNITS — SIGNIFICANT CHANGE UP (ref 0–20)
LMWH PPP CHRO-ACNC: 0.31 IU/ML — LOW (ref 0.5–1.1)
LMWH PPP CHRO-ACNC: 0.31 IU/ML — LOW (ref 0.5–1.1)
LMWH PPP CHRO-ACNC: 0.34 IU/ML — LOW (ref 0.5–1.1)
LMWH PPP CHRO-ACNC: 0.34 IU/ML — LOW (ref 0.5–1.1)
LMWH PPP CHRO-ACNC: 0.5 IU/ML — SIGNIFICANT CHANGE UP (ref 0.5–1.1)
LMWH PPP CHRO-ACNC: 0.5 IU/ML — SIGNIFICANT CHANGE UP (ref 0.5–1.1)
LYMPHOCYTES # BLD AUTO: 0.11 K/UL — LOW (ref 1–3.3)
LYMPHOCYTES # BLD AUTO: 0.21 K/UL — LOW (ref 1–3.3)
LYMPHOCYTES # BLD AUTO: 0.28 K/UL — LOW (ref 1–3.3)
LYMPHOCYTES # BLD AUTO: 0.28 K/UL — LOW (ref 1–3.3)
LYMPHOCYTES # BLD AUTO: 0.44 K/UL — LOW (ref 1–3.3)
LYMPHOCYTES # BLD AUTO: 0.49 K/UL — LOW (ref 1–3.3)
LYMPHOCYTES # BLD AUTO: 0.58 K/UL — LOW (ref 1–3.3)
LYMPHOCYTES # BLD AUTO: 0.58 K/UL — LOW (ref 1–3.3)
LYMPHOCYTES # BLD AUTO: 0.66 K/UL — LOW (ref 1–3.3)
LYMPHOCYTES # BLD AUTO: 0.68 K/UL — LOW (ref 1–3.3)
LYMPHOCYTES # BLD AUTO: 0.71 K/UL — LOW (ref 1–3.3)
LYMPHOCYTES # BLD AUTO: 0.76 K/UL — LOW (ref 1–3.3)
LYMPHOCYTES # BLD AUTO: 0.76 K/UL — LOW (ref 1–3.3)
LYMPHOCYTES # BLD AUTO: 0.83 K/UL — LOW (ref 1–3.3)
LYMPHOCYTES # BLD AUTO: 0.84 K/UL — LOW (ref 1–3.3)
LYMPHOCYTES # BLD AUTO: 0.84 K/UL — LOW (ref 1–3.3)
LYMPHOCYTES # BLD AUTO: 0.9 % — LOW (ref 13–44)
LYMPHOCYTES # BLD AUTO: 0.91 K/UL — LOW (ref 1–3.3)
LYMPHOCYTES # BLD AUTO: 0.91 K/UL — LOW (ref 1–3.3)
LYMPHOCYTES # BLD AUTO: 0.92 K/UL — LOW (ref 1–3.3)
LYMPHOCYTES # BLD AUTO: 0.92 K/UL — LOW (ref 1–3.3)
LYMPHOCYTES # BLD AUTO: 0.94 K/UL — LOW (ref 1–3.3)
LYMPHOCYTES # BLD AUTO: 0.96 K/UL — LOW (ref 1–3.3)
LYMPHOCYTES # BLD AUTO: 0.96 K/UL — LOW (ref 1–3.3)
LYMPHOCYTES # BLD AUTO: 0.97 K/UL — LOW (ref 1–3.3)
LYMPHOCYTES # BLD AUTO: 0.97 K/UL — LOW (ref 1–3.3)
LYMPHOCYTES # BLD AUTO: 0.98 K/UL — LOW (ref 1–3.3)
LYMPHOCYTES # BLD AUTO: 1 K/UL — SIGNIFICANT CHANGE UP (ref 1–3.3)
LYMPHOCYTES # BLD AUTO: 1.02 K/UL — SIGNIFICANT CHANGE UP (ref 1–3.3)
LYMPHOCYTES # BLD AUTO: 1.02 K/UL — SIGNIFICANT CHANGE UP (ref 1–3.3)
LYMPHOCYTES # BLD AUTO: 1.04 K/UL — SIGNIFICANT CHANGE UP (ref 1–3.3)
LYMPHOCYTES # BLD AUTO: 1.09 K/UL — SIGNIFICANT CHANGE UP (ref 1–3.3)
LYMPHOCYTES # BLD AUTO: 1.13 K/UL — SIGNIFICANT CHANGE UP (ref 1–3.3)
LYMPHOCYTES # BLD AUTO: 1.13 K/UL — SIGNIFICANT CHANGE UP (ref 1–3.3)
LYMPHOCYTES # BLD AUTO: 1.22 K/UL — SIGNIFICANT CHANGE UP (ref 1–3.3)
LYMPHOCYTES # BLD AUTO: 1.22 K/UL — SIGNIFICANT CHANGE UP (ref 1–3.3)
LYMPHOCYTES # BLD AUTO: 1.33 K/UL — SIGNIFICANT CHANGE UP (ref 1–3.3)
LYMPHOCYTES # BLD AUTO: 1.33 K/UL — SIGNIFICANT CHANGE UP (ref 1–3.3)
LYMPHOCYTES # BLD AUTO: 1.39 K/UL — SIGNIFICANT CHANGE UP (ref 1–3.3)
LYMPHOCYTES # BLD AUTO: 1.39 K/UL — SIGNIFICANT CHANGE UP (ref 1–3.3)
LYMPHOCYTES # BLD AUTO: 1.7 % — LOW (ref 13–44)
LYMPHOCYTES # BLD AUTO: 10.6 % — LOW (ref 13–44)
LYMPHOCYTES # BLD AUTO: 10.6 % — LOW (ref 13–44)
LYMPHOCYTES # BLD AUTO: 10.7 % — LOW (ref 13–44)
LYMPHOCYTES # BLD AUTO: 11 % — LOW (ref 13–44)
LYMPHOCYTES # BLD AUTO: 11 % — LOW (ref 13–44)
LYMPHOCYTES # BLD AUTO: 11.1 % — LOW (ref 13–44)
LYMPHOCYTES # BLD AUTO: 11.1 % — LOW (ref 13–44)
LYMPHOCYTES # BLD AUTO: 12.3 % — LOW (ref 13–44)
LYMPHOCYTES # BLD AUTO: 12.3 % — LOW (ref 13–44)
LYMPHOCYTES # BLD AUTO: 12.4 % — LOW (ref 13–44)
LYMPHOCYTES # BLD AUTO: 12.4 % — LOW (ref 13–44)
LYMPHOCYTES # BLD AUTO: 12.8 % — LOW (ref 13–44)
LYMPHOCYTES # BLD AUTO: 12.8 % — LOW (ref 13–44)
LYMPHOCYTES # BLD AUTO: 13.1 % — SIGNIFICANT CHANGE UP (ref 13–44)
LYMPHOCYTES # BLD AUTO: 13.1 % — SIGNIFICANT CHANGE UP (ref 13–44)
LYMPHOCYTES # BLD AUTO: 13.4 % — SIGNIFICANT CHANGE UP (ref 13–44)
LYMPHOCYTES # BLD AUTO: 13.4 % — SIGNIFICANT CHANGE UP (ref 13–44)
LYMPHOCYTES # BLD AUTO: 14 % — SIGNIFICANT CHANGE UP (ref 13–44)
LYMPHOCYTES # BLD AUTO: 14 % — SIGNIFICANT CHANGE UP (ref 13–44)
LYMPHOCYTES # BLD AUTO: 15.1 % — SIGNIFICANT CHANGE UP (ref 13–44)
LYMPHOCYTES # BLD AUTO: 15.5 % — SIGNIFICANT CHANGE UP (ref 13–44)
LYMPHOCYTES # BLD AUTO: 17 % — SIGNIFICANT CHANGE UP (ref 13–44)
LYMPHOCYTES # BLD AUTO: 17 % — SIGNIFICANT CHANGE UP (ref 13–44)
LYMPHOCYTES # BLD AUTO: 2.6 % — LOW (ref 13–44)
LYMPHOCYTES # BLD AUTO: 2.6 % — LOW (ref 13–44)
LYMPHOCYTES # BLD AUTO: 3.6 % — LOW (ref 13–44)
LYMPHOCYTES # BLD AUTO: 4.4 % — LOW (ref 13–44)
LYMPHOCYTES # BLD AUTO: 4.4 % — LOW (ref 13–44)
LYMPHOCYTES # BLD AUTO: 4.8 % — LOW (ref 13–44)
LYMPHOCYTES # BLD AUTO: 6.2 % — LOW (ref 13–44)
LYMPHOCYTES # BLD AUTO: 6.2 % — LOW (ref 13–44)
LYMPHOCYTES # BLD AUTO: 6.4 % — LOW (ref 13–44)
LYMPHOCYTES # BLD AUTO: 7 % — LOW (ref 13–44)
LYMPHOCYTES # BLD AUTO: 7 % — LOW (ref 13–44)
LYMPHOCYTES # BLD AUTO: 8 % — LOW (ref 13–44)
LYMPHOCYTES # BLD AUTO: 8 % — LOW (ref 13–44)
LYMPHOCYTES # BLD AUTO: 8.1 % — LOW (ref 13–44)
LYMPHOCYTES # BLD AUTO: 8.1 % — LOW (ref 13–44)
LYMPHOCYTES # BLD AUTO: 8.2 % — LOW (ref 13–44)
LYMPHOCYTES # BLD AUTO: 8.2 % — LOW (ref 13–44)
LYMPHOCYTES # BLD AUTO: 9.7 % — LOW (ref 13–44)
LYMPHOCYTES # BLD AUTO: 9.7 % — LOW (ref 13–44)
LYMPHOCYTES # BLD AUTO: 9.9 % — LOW (ref 13–44)
LYMPHOCYTES # BLD AUTO: 9.9 % — LOW (ref 13–44)
M PNEUMO DNA SPEC QL NAA+PROBE: NEGATIVE — SIGNIFICANT CHANGE UP
M PNEUMO DNA SPEC QL NAA+PROBE: NEGATIVE — SIGNIFICANT CHANGE UP
M PROTEIN 24H UR ELPH-MRATE: 2.63 MG/24HR — SIGNIFICANT CHANGE UP (ref 0–0)
M PROTEIN 24H UR ELPH-MRATE: 2.63 MG/24HR — SIGNIFICANT CHANGE UP (ref 0–0)
M PROTEIN 24H UR ELPH-MRATE: 7.3 % — SIGNIFICANT CHANGE UP
M PROTEIN 24H UR ELPH-MRATE: 7.3 % — SIGNIFICANT CHANGE UP
M PROTEIN 24H UR ELPH-MRATE: PRESENT
M PROTEIN 24H UR ELPH-MRATE: PRESENT
M-SPIKE: 1.7 G/DL — HIGH (ref 0–0)
M-SPIKE: 1.7 G/DL — HIGH (ref 0–0)
MACROCYTES BLD QL: SIGNIFICANT CHANGE UP
MACROCYTES BLD QL: SLIGHT — SIGNIFICANT CHANGE UP
MAGNESIUM SERPL-MCNC: 1.6 MG/DL — SIGNIFICANT CHANGE UP (ref 1.6–2.6)
MAGNESIUM SERPL-MCNC: 1.7 MG/DL — SIGNIFICANT CHANGE UP (ref 1.6–2.6)
MAGNESIUM SERPL-MCNC: 1.8 MG/DL — SIGNIFICANT CHANGE UP (ref 1.6–2.6)
MAGNESIUM SERPL-MCNC: 1.9 MG/DL — SIGNIFICANT CHANGE UP (ref 1.6–2.6)
MAGNESIUM SERPL-MCNC: 2 MG/DL — SIGNIFICANT CHANGE UP (ref 1.6–2.6)
MAGNESIUM SERPL-MCNC: 2.1 MG/DL — SIGNIFICANT CHANGE UP (ref 1.6–2.6)
MAGNESIUM SERPL-MCNC: 2.2 MG/DL — SIGNIFICANT CHANGE UP (ref 1.6–2.6)
MAGNESIUM SERPL-MCNC: 2.3 MG/DL — SIGNIFICANT CHANGE UP (ref 1.6–2.6)
MAGNESIUM SERPL-MCNC: 2.4 MG/DL — SIGNIFICANT CHANGE UP (ref 1.6–2.6)
MAGNESIUM SERPL-MCNC: 2.5 MG/DL — SIGNIFICANT CHANGE UP (ref 1.6–2.6)
MAGNESIUM SERPL-MCNC: 2.7 MG/DL — HIGH (ref 1.6–2.6)
MAGNESIUM SERPL-MCNC: 2.7 MG/DL — HIGH (ref 1.6–2.6)
MAGNESIUM SERPL-MCNC: 3.5 MG/DL — HIGH (ref 1.6–2.6)
MAGNESIUM SERPL-MCNC: 3.5 MG/DL — HIGH (ref 1.6–2.6)
MAGNESIUM SERPL-MCNC: SIGNIFICANT CHANGE UP MG/DL (ref 1.6–2.6)
MANGANESE SERPL-MCNC: <2.5 UG/L — SIGNIFICANT CHANGE UP
MANUAL SMEAR VERIFICATION: SIGNIFICANT CHANGE UP
MCHC RBC-ENTMCNC: 26.3 GM/DL — LOW (ref 32–36)
MCHC RBC-ENTMCNC: 26.3 GM/DL — LOW (ref 32–36)
MCHC RBC-ENTMCNC: 26.8 GM/DL — LOW (ref 32–36)
MCHC RBC-ENTMCNC: 26.8 GM/DL — LOW (ref 32–36)
MCHC RBC-ENTMCNC: 28 PG — SIGNIFICANT CHANGE UP (ref 27–34)
MCHC RBC-ENTMCNC: 28.1 PG — SIGNIFICANT CHANGE UP (ref 27–34)
MCHC RBC-ENTMCNC: 28.1 PG — SIGNIFICANT CHANGE UP (ref 27–34)
MCHC RBC-ENTMCNC: 28.2 PG — SIGNIFICANT CHANGE UP (ref 27–34)
MCHC RBC-ENTMCNC: 28.3 PG — SIGNIFICANT CHANGE UP (ref 27–34)
MCHC RBC-ENTMCNC: 28.3 PG — SIGNIFICANT CHANGE UP (ref 27–34)
MCHC RBC-ENTMCNC: 28.4 PG — SIGNIFICANT CHANGE UP (ref 27–34)
MCHC RBC-ENTMCNC: 28.4 PG — SIGNIFICANT CHANGE UP (ref 27–34)
MCHC RBC-ENTMCNC: 28.5 PG — SIGNIFICANT CHANGE UP (ref 27–34)
MCHC RBC-ENTMCNC: 28.5 PG — SIGNIFICANT CHANGE UP (ref 27–34)
MCHC RBC-ENTMCNC: 28.6 PG — SIGNIFICANT CHANGE UP (ref 27–34)
MCHC RBC-ENTMCNC: 28.6 PG — SIGNIFICANT CHANGE UP (ref 27–34)
MCHC RBC-ENTMCNC: 28.7 GM/DL — LOW (ref 32–36)
MCHC RBC-ENTMCNC: 28.7 GM/DL — LOW (ref 32–36)
MCHC RBC-ENTMCNC: 28.7 PG — SIGNIFICANT CHANGE UP (ref 27–34)
MCHC RBC-ENTMCNC: 28.7 PG — SIGNIFICANT CHANGE UP (ref 27–34)
MCHC RBC-ENTMCNC: 28.8 PG — SIGNIFICANT CHANGE UP (ref 27–34)
MCHC RBC-ENTMCNC: 28.9 PG — SIGNIFICANT CHANGE UP (ref 27–34)
MCHC RBC-ENTMCNC: 29 PG — SIGNIFICANT CHANGE UP (ref 27–34)
MCHC RBC-ENTMCNC: 29 PG — SIGNIFICANT CHANGE UP (ref 27–34)
MCHC RBC-ENTMCNC: 29.1 PG — SIGNIFICANT CHANGE UP (ref 27–34)
MCHC RBC-ENTMCNC: 29.2 PG — SIGNIFICANT CHANGE UP (ref 27–34)
MCHC RBC-ENTMCNC: 29.2 PG — SIGNIFICANT CHANGE UP (ref 27–34)
MCHC RBC-ENTMCNC: 29.3 PG — SIGNIFICANT CHANGE UP (ref 27–34)
MCHC RBC-ENTMCNC: 29.3 PG — SIGNIFICANT CHANGE UP (ref 27–34)
MCHC RBC-ENTMCNC: 29.4 PG — SIGNIFICANT CHANGE UP (ref 27–34)
MCHC RBC-ENTMCNC: 29.5 PG — SIGNIFICANT CHANGE UP (ref 27–34)
MCHC RBC-ENTMCNC: 29.6 PG — SIGNIFICANT CHANGE UP (ref 27–34)
MCHC RBC-ENTMCNC: 29.6 PG — SIGNIFICANT CHANGE UP (ref 27–34)
MCHC RBC-ENTMCNC: 29.7 PG — SIGNIFICANT CHANGE UP (ref 27–34)
MCHC RBC-ENTMCNC: 29.8 PG — SIGNIFICANT CHANGE UP (ref 27–34)
MCHC RBC-ENTMCNC: 29.9 PG — SIGNIFICANT CHANGE UP (ref 27–34)
MCHC RBC-ENTMCNC: 30 GM/DL — LOW (ref 32–36)
MCHC RBC-ENTMCNC: 30 GM/DL — LOW (ref 32–36)
MCHC RBC-ENTMCNC: 30 PG — SIGNIFICANT CHANGE UP (ref 27–34)
MCHC RBC-ENTMCNC: 30.1 GM/DL — LOW (ref 32–36)
MCHC RBC-ENTMCNC: 30.1 GM/DL — LOW (ref 32–36)
MCHC RBC-ENTMCNC: 30.1 PG — SIGNIFICANT CHANGE UP (ref 27–34)
MCHC RBC-ENTMCNC: 30.2 GM/DL — LOW (ref 32–36)
MCHC RBC-ENTMCNC: 30.2 PG — SIGNIFICANT CHANGE UP (ref 27–34)
MCHC RBC-ENTMCNC: 30.3 GM/DL — LOW (ref 32–36)
MCHC RBC-ENTMCNC: 30.3 GM/DL — LOW (ref 32–36)
MCHC RBC-ENTMCNC: 30.3 PG — SIGNIFICANT CHANGE UP (ref 27–34)
MCHC RBC-ENTMCNC: 30.4 GM/DL — LOW (ref 32–36)
MCHC RBC-ENTMCNC: 30.4 PG — SIGNIFICANT CHANGE UP (ref 27–34)
MCHC RBC-ENTMCNC: 30.5 GM/DL — LOW (ref 32–36)
MCHC RBC-ENTMCNC: 30.5 PG — SIGNIFICANT CHANGE UP (ref 27–34)
MCHC RBC-ENTMCNC: 30.6 GM/DL — LOW (ref 32–36)
MCHC RBC-ENTMCNC: 30.6 PG — SIGNIFICANT CHANGE UP (ref 27–34)
MCHC RBC-ENTMCNC: 30.7 GM/DL — LOW (ref 32–36)
MCHC RBC-ENTMCNC: 30.7 PG — SIGNIFICANT CHANGE UP (ref 27–34)
MCHC RBC-ENTMCNC: 30.8 GM/DL — LOW (ref 32–36)
MCHC RBC-ENTMCNC: 30.8 PG — SIGNIFICANT CHANGE UP (ref 27–34)
MCHC RBC-ENTMCNC: 30.9 GM/DL — LOW (ref 32–36)
MCHC RBC-ENTMCNC: 30.9 PG — SIGNIFICANT CHANGE UP (ref 27–34)
MCHC RBC-ENTMCNC: 31 GM/DL — LOW (ref 32–36)
MCHC RBC-ENTMCNC: 31 PG — SIGNIFICANT CHANGE UP (ref 27–34)
MCHC RBC-ENTMCNC: 31.1 GM/DL — LOW (ref 32–36)
MCHC RBC-ENTMCNC: 31.1 PG — SIGNIFICANT CHANGE UP (ref 27–34)
MCHC RBC-ENTMCNC: 31.2 GM/DL — LOW (ref 32–36)
MCHC RBC-ENTMCNC: 31.2 PG — SIGNIFICANT CHANGE UP (ref 27–34)
MCHC RBC-ENTMCNC: 31.3 GM/DL — LOW (ref 32–36)
MCHC RBC-ENTMCNC: 31.3 PG — SIGNIFICANT CHANGE UP (ref 27–34)
MCHC RBC-ENTMCNC: 31.4 GM/DL — LOW (ref 32–36)
MCHC RBC-ENTMCNC: 31.4 PG — SIGNIFICANT CHANGE UP (ref 27–34)
MCHC RBC-ENTMCNC: 31.5 GM/DL — LOW (ref 32–36)
MCHC RBC-ENTMCNC: 31.5 PG — SIGNIFICANT CHANGE UP (ref 27–34)
MCHC RBC-ENTMCNC: 31.6 GM/DL — LOW (ref 32–36)
MCHC RBC-ENTMCNC: 31.6 PG — SIGNIFICANT CHANGE UP (ref 27–34)
MCHC RBC-ENTMCNC: 31.7 GM/DL — LOW (ref 32–36)
MCHC RBC-ENTMCNC: 31.7 PG — SIGNIFICANT CHANGE UP (ref 27–34)
MCHC RBC-ENTMCNC: 31.7 PG — SIGNIFICANT CHANGE UP (ref 27–34)
MCHC RBC-ENTMCNC: 31.8 GM/DL — LOW (ref 32–36)
MCHC RBC-ENTMCNC: 31.8 PG — SIGNIFICANT CHANGE UP (ref 27–34)
MCHC RBC-ENTMCNC: 31.9 GM/DL — LOW (ref 32–36)
MCHC RBC-ENTMCNC: 31.9 PG — SIGNIFICANT CHANGE UP (ref 27–34)
MCHC RBC-ENTMCNC: 32 GM/DL — SIGNIFICANT CHANGE UP (ref 32–36)
MCHC RBC-ENTMCNC: 32 PG — SIGNIFICANT CHANGE UP (ref 27–34)
MCHC RBC-ENTMCNC: 32.1 GM/DL — SIGNIFICANT CHANGE UP (ref 32–36)
MCHC RBC-ENTMCNC: 32.1 PG — SIGNIFICANT CHANGE UP (ref 27–34)
MCHC RBC-ENTMCNC: 32.2 GM/DL — SIGNIFICANT CHANGE UP (ref 32–36)
MCHC RBC-ENTMCNC: 32.2 PG — SIGNIFICANT CHANGE UP (ref 27–34)
MCHC RBC-ENTMCNC: 32.3 GM/DL — SIGNIFICANT CHANGE UP (ref 32–36)
MCHC RBC-ENTMCNC: 32.3 PG — SIGNIFICANT CHANGE UP (ref 27–34)
MCHC RBC-ENTMCNC: 32.4 GM/DL — SIGNIFICANT CHANGE UP (ref 32–36)
MCHC RBC-ENTMCNC: 32.5 GM/DL — SIGNIFICANT CHANGE UP (ref 32–36)
MCHC RBC-ENTMCNC: 32.5 GM/DL — SIGNIFICANT CHANGE UP (ref 32–36)
MCHC RBC-ENTMCNC: 32.5 PG — SIGNIFICANT CHANGE UP (ref 27–34)
MCHC RBC-ENTMCNC: 32.5 PG — SIGNIFICANT CHANGE UP (ref 27–34)
MCHC RBC-ENTMCNC: 32.6 GM/DL — SIGNIFICANT CHANGE UP (ref 32–36)
MCHC RBC-ENTMCNC: 32.6 PG — SIGNIFICANT CHANGE UP (ref 27–34)
MCHC RBC-ENTMCNC: 32.6 PG — SIGNIFICANT CHANGE UP (ref 27–34)
MCHC RBC-ENTMCNC: 32.7 GM/DL — SIGNIFICANT CHANGE UP (ref 32–36)
MCHC RBC-ENTMCNC: 32.8 GM/DL — SIGNIFICANT CHANGE UP (ref 32–36)
MCHC RBC-ENTMCNC: 32.9 PG — SIGNIFICANT CHANGE UP (ref 27–34)
MCHC RBC-ENTMCNC: 32.9 PG — SIGNIFICANT CHANGE UP (ref 27–34)
MCHC RBC-ENTMCNC: 33 GM/DL — SIGNIFICANT CHANGE UP (ref 32–36)
MCV RBC AUTO: 100 FL — SIGNIFICANT CHANGE UP (ref 80–100)
MCV RBC AUTO: 100.4 FL — HIGH (ref 80–100)
MCV RBC AUTO: 100.9 FL — HIGH (ref 80–100)
MCV RBC AUTO: 100.9 FL — HIGH (ref 80–100)
MCV RBC AUTO: 101.1 FL — HIGH (ref 80–100)
MCV RBC AUTO: 101.1 FL — HIGH (ref 80–100)
MCV RBC AUTO: 101.4 FL — HIGH (ref 80–100)
MCV RBC AUTO: 101.4 FL — HIGH (ref 80–100)
MCV RBC AUTO: 102 FL — HIGH (ref 80–100)
MCV RBC AUTO: 102 FL — HIGH (ref 80–100)
MCV RBC AUTO: 102.1 FL — HIGH (ref 80–100)
MCV RBC AUTO: 102.1 FL — HIGH (ref 80–100)
MCV RBC AUTO: 102.2 FL — HIGH (ref 80–100)
MCV RBC AUTO: 102.2 FL — HIGH (ref 80–100)
MCV RBC AUTO: 102.3 FL — HIGH (ref 80–100)
MCV RBC AUTO: 102.3 FL — HIGH (ref 80–100)
MCV RBC AUTO: 102.4 FL — HIGH (ref 80–100)
MCV RBC AUTO: 102.4 FL — HIGH (ref 80–100)
MCV RBC AUTO: 102.5 FL — HIGH (ref 80–100)
MCV RBC AUTO: 102.5 FL — HIGH (ref 80–100)
MCV RBC AUTO: 102.6 FL — HIGH (ref 80–100)
MCV RBC AUTO: 102.8 FL — HIGH (ref 80–100)
MCV RBC AUTO: 103.1 FL — HIGH (ref 80–100)
MCV RBC AUTO: 103.1 FL — HIGH (ref 80–100)
MCV RBC AUTO: 103.5 FL — HIGH (ref 80–100)
MCV RBC AUTO: 103.9 FL — HIGH (ref 80–100)
MCV RBC AUTO: 103.9 FL — HIGH (ref 80–100)
MCV RBC AUTO: 104.5 FL — HIGH (ref 80–100)
MCV RBC AUTO: 104.5 FL — HIGH (ref 80–100)
MCV RBC AUTO: 104.7 FL — HIGH (ref 80–100)
MCV RBC AUTO: 104.7 FL — HIGH (ref 80–100)
MCV RBC AUTO: 104.8 FL — HIGH (ref 80–100)
MCV RBC AUTO: 105.3 FL — HIGH (ref 80–100)
MCV RBC AUTO: 105.3 FL — HIGH (ref 80–100)
MCV RBC AUTO: 120.7 FL — HIGH (ref 80–100)
MCV RBC AUTO: 120.7 FL — HIGH (ref 80–100)
MCV RBC AUTO: 122.9 FL — HIGH (ref 80–100)
MCV RBC AUTO: 122.9 FL — HIGH (ref 80–100)
MCV RBC AUTO: 87.9 FL — SIGNIFICANT CHANGE UP (ref 80–100)
MCV RBC AUTO: 88.6 FL — SIGNIFICANT CHANGE UP (ref 80–100)
MCV RBC AUTO: 88.6 FL — SIGNIFICANT CHANGE UP (ref 80–100)
MCV RBC AUTO: 89.2 FL — SIGNIFICANT CHANGE UP (ref 80–100)
MCV RBC AUTO: 89.4 FL — SIGNIFICANT CHANGE UP (ref 80–100)
MCV RBC AUTO: 89.4 FL — SIGNIFICANT CHANGE UP (ref 80–100)
MCV RBC AUTO: 89.7 FL — SIGNIFICANT CHANGE UP (ref 80–100)
MCV RBC AUTO: 90 FL — SIGNIFICANT CHANGE UP (ref 80–100)
MCV RBC AUTO: 90.2 FL — SIGNIFICANT CHANGE UP (ref 80–100)
MCV RBC AUTO: 90.2 FL — SIGNIFICANT CHANGE UP (ref 80–100)
MCV RBC AUTO: 90.3 FL — SIGNIFICANT CHANGE UP (ref 80–100)
MCV RBC AUTO: 90.4 FL — SIGNIFICANT CHANGE UP (ref 80–100)
MCV RBC AUTO: 90.6 FL — SIGNIFICANT CHANGE UP (ref 80–100)
MCV RBC AUTO: 90.8 FL — SIGNIFICANT CHANGE UP (ref 80–100)
MCV RBC AUTO: 90.9 FL — SIGNIFICANT CHANGE UP (ref 80–100)
MCV RBC AUTO: 90.9 FL — SIGNIFICANT CHANGE UP (ref 80–100)
MCV RBC AUTO: 91 FL — SIGNIFICANT CHANGE UP (ref 80–100)
MCV RBC AUTO: 91 FL — SIGNIFICANT CHANGE UP (ref 80–100)
MCV RBC AUTO: 91.3 FL — SIGNIFICANT CHANGE UP (ref 80–100)
MCV RBC AUTO: 91.4 FL — SIGNIFICANT CHANGE UP (ref 80–100)
MCV RBC AUTO: 91.6 FL — SIGNIFICANT CHANGE UP (ref 80–100)
MCV RBC AUTO: 91.8 FL — SIGNIFICANT CHANGE UP (ref 80–100)
MCV RBC AUTO: 92.1 FL — SIGNIFICANT CHANGE UP (ref 80–100)
MCV RBC AUTO: 92.2 FL — SIGNIFICANT CHANGE UP (ref 80–100)
MCV RBC AUTO: 92.4 FL — SIGNIFICANT CHANGE UP (ref 80–100)
MCV RBC AUTO: 92.5 FL — SIGNIFICANT CHANGE UP (ref 80–100)
MCV RBC AUTO: 92.6 FL — SIGNIFICANT CHANGE UP (ref 80–100)
MCV RBC AUTO: 92.7 FL — SIGNIFICANT CHANGE UP (ref 80–100)
MCV RBC AUTO: 92.7 FL — SIGNIFICANT CHANGE UP (ref 80–100)
MCV RBC AUTO: 92.8 FL — SIGNIFICANT CHANGE UP (ref 80–100)
MCV RBC AUTO: 92.9 FL — SIGNIFICANT CHANGE UP (ref 80–100)
MCV RBC AUTO: 93 FL — SIGNIFICANT CHANGE UP (ref 80–100)
MCV RBC AUTO: 93.1 FL — SIGNIFICANT CHANGE UP (ref 80–100)
MCV RBC AUTO: 93.3 FL — SIGNIFICANT CHANGE UP (ref 80–100)
MCV RBC AUTO: 93.4 FL — SIGNIFICANT CHANGE UP (ref 80–100)
MCV RBC AUTO: 93.7 FL — SIGNIFICANT CHANGE UP (ref 80–100)
MCV RBC AUTO: 93.9 FL — SIGNIFICANT CHANGE UP (ref 80–100)
MCV RBC AUTO: 94 FL — SIGNIFICANT CHANGE UP (ref 80–100)
MCV RBC AUTO: 94.2 FL — SIGNIFICANT CHANGE UP (ref 80–100)
MCV RBC AUTO: 94.3 FL — SIGNIFICANT CHANGE UP (ref 80–100)
MCV RBC AUTO: 94.5 FL — SIGNIFICANT CHANGE UP (ref 80–100)
MCV RBC AUTO: 94.5 FL — SIGNIFICANT CHANGE UP (ref 80–100)
MCV RBC AUTO: 94.7 FL — SIGNIFICANT CHANGE UP (ref 80–100)
MCV RBC AUTO: 94.8 FL — SIGNIFICANT CHANGE UP (ref 80–100)
MCV RBC AUTO: 94.9 FL — SIGNIFICANT CHANGE UP (ref 80–100)
MCV RBC AUTO: 95 FL — SIGNIFICANT CHANGE UP (ref 80–100)
MCV RBC AUTO: 95.1 FL — SIGNIFICANT CHANGE UP (ref 80–100)
MCV RBC AUTO: 95.2 FL — SIGNIFICANT CHANGE UP (ref 80–100)
MCV RBC AUTO: 95.2 FL — SIGNIFICANT CHANGE UP (ref 80–100)
MCV RBC AUTO: 95.3 FL — SIGNIFICANT CHANGE UP (ref 80–100)
MCV RBC AUTO: 95.3 FL — SIGNIFICANT CHANGE UP (ref 80–100)
MCV RBC AUTO: 95.4 FL — SIGNIFICANT CHANGE UP (ref 80–100)
MCV RBC AUTO: 95.5 FL — SIGNIFICANT CHANGE UP (ref 80–100)
MCV RBC AUTO: 95.7 FL — SIGNIFICANT CHANGE UP (ref 80–100)
MCV RBC AUTO: 95.8 FL — SIGNIFICANT CHANGE UP (ref 80–100)
MCV RBC AUTO: 95.8 FL — SIGNIFICANT CHANGE UP (ref 80–100)
MCV RBC AUTO: 95.9 FL — SIGNIFICANT CHANGE UP (ref 80–100)
MCV RBC AUTO: 96 FL — SIGNIFICANT CHANGE UP (ref 80–100)
MCV RBC AUTO: 96.2 FL — SIGNIFICANT CHANGE UP (ref 80–100)
MCV RBC AUTO: 96.3 FL — SIGNIFICANT CHANGE UP (ref 80–100)
MCV RBC AUTO: 96.4 FL — SIGNIFICANT CHANGE UP (ref 80–100)
MCV RBC AUTO: 96.6 FL — SIGNIFICANT CHANGE UP (ref 80–100)
MCV RBC AUTO: 96.7 FL — SIGNIFICANT CHANGE UP (ref 80–100)
MCV RBC AUTO: 96.8 FL — SIGNIFICANT CHANGE UP (ref 80–100)
MCV RBC AUTO: 97 FL — SIGNIFICANT CHANGE UP (ref 80–100)
MCV RBC AUTO: 97.1 FL — SIGNIFICANT CHANGE UP (ref 80–100)
MCV RBC AUTO: 97.1 FL — SIGNIFICANT CHANGE UP (ref 80–100)
MCV RBC AUTO: 97.2 FL — SIGNIFICANT CHANGE UP (ref 80–100)
MCV RBC AUTO: 97.6 FL — SIGNIFICANT CHANGE UP (ref 80–100)
MCV RBC AUTO: 97.8 FL — SIGNIFICANT CHANGE UP (ref 80–100)
MCV RBC AUTO: 97.8 FL — SIGNIFICANT CHANGE UP (ref 80–100)
MCV RBC AUTO: 97.9 FL — SIGNIFICANT CHANGE UP (ref 80–100)
MCV RBC AUTO: 98.2 FL — SIGNIFICANT CHANGE UP (ref 80–100)
MCV RBC AUTO: 98.2 FL — SIGNIFICANT CHANGE UP (ref 80–100)
MCV RBC AUTO: 98.3 FL — SIGNIFICANT CHANGE UP (ref 80–100)
MCV RBC AUTO: 98.3 FL — SIGNIFICANT CHANGE UP (ref 80–100)
MCV RBC AUTO: 98.5 FL — SIGNIFICANT CHANGE UP (ref 80–100)
MCV RBC AUTO: 98.6 FL — SIGNIFICANT CHANGE UP (ref 80–100)
MCV RBC AUTO: 98.6 FL — SIGNIFICANT CHANGE UP (ref 80–100)
MCV RBC AUTO: 98.7 FL — SIGNIFICANT CHANGE UP (ref 80–100)
MCV RBC AUTO: 99.2 FL — SIGNIFICANT CHANGE UP (ref 80–100)
MCV RBC AUTO: 99.3 FL — SIGNIFICANT CHANGE UP (ref 80–100)
MCV RBC AUTO: 99.3 FL — SIGNIFICANT CHANGE UP (ref 80–100)
MCV RBC AUTO: 99.4 FL — SIGNIFICANT CHANGE UP (ref 80–100)
MCV RBC AUTO: 99.4 FL — SIGNIFICANT CHANGE UP (ref 80–100)
MCV RBC AUTO: 99.6 FL — SIGNIFICANT CHANGE UP (ref 80–100)
MCV RBC AUTO: 99.7 FL — SIGNIFICANT CHANGE UP (ref 80–100)
MCV RBC AUTO: 99.8 FL — SIGNIFICANT CHANGE UP (ref 80–100)
METAMYELOCYTES # FLD: 0.9 % — HIGH (ref 0–0)
METAMYELOCYTES # FLD: 1.8 % — HIGH (ref 0–0)
METHADONE UR-MCNC: POSITIVE
METHADONE UR-MCNC: POSITIVE
METHGB MFR BLDA: 0 % — SIGNIFICANT CHANGE UP
METHGB MFR BLDA: 0.3 % — SIGNIFICANT CHANGE UP
METHGB MFR BLDA: 0.4 % — SIGNIFICANT CHANGE UP
METHGB MFR BLDA: 0.7 % — SIGNIFICANT CHANGE UP
METHGB MFR BLDA: 0.8 % — SIGNIFICANT CHANGE UP
METHGB MFR BLDA: 1.3 % — SIGNIFICANT CHANGE UP
METHOD TYPE: SIGNIFICANT CHANGE UP
MICROCYTES BLD QL: SLIGHT — SIGNIFICANT CHANGE UP
MONOCYTES # BLD AUTO: 0 K/UL — SIGNIFICANT CHANGE UP (ref 0–0.9)
MONOCYTES # BLD AUTO: 0.2 K/UL — SIGNIFICANT CHANGE UP (ref 0–0.9)
MONOCYTES # BLD AUTO: 0.22 K/UL — SIGNIFICANT CHANGE UP (ref 0–0.9)
MONOCYTES # BLD AUTO: 0.55 K/UL — SIGNIFICANT CHANGE UP (ref 0–0.9)
MONOCYTES # BLD AUTO: 0.64 K/UL — SIGNIFICANT CHANGE UP (ref 0–0.9)
MONOCYTES # BLD AUTO: 0.66 K/UL — SIGNIFICANT CHANGE UP (ref 0–0.9)
MONOCYTES # BLD AUTO: 0.66 K/UL — SIGNIFICANT CHANGE UP (ref 0–0.9)
MONOCYTES # BLD AUTO: 0.67 K/UL — SIGNIFICANT CHANGE UP (ref 0–0.9)
MONOCYTES # BLD AUTO: 0.69 K/UL — SIGNIFICANT CHANGE UP (ref 0–0.9)
MONOCYTES # BLD AUTO: 0.71 K/UL — SIGNIFICANT CHANGE UP (ref 0–0.9)
MONOCYTES # BLD AUTO: 0.72 K/UL — SIGNIFICANT CHANGE UP (ref 0–0.9)
MONOCYTES # BLD AUTO: 0.72 K/UL — SIGNIFICANT CHANGE UP (ref 0–0.9)
MONOCYTES # BLD AUTO: 0.76 K/UL — SIGNIFICANT CHANGE UP (ref 0–0.9)
MONOCYTES # BLD AUTO: 0.76 K/UL — SIGNIFICANT CHANGE UP (ref 0–0.9)
MONOCYTES # BLD AUTO: 0.85 K/UL — SIGNIFICANT CHANGE UP (ref 0–0.9)
MONOCYTES # BLD AUTO: 0.85 K/UL — SIGNIFICANT CHANGE UP (ref 0–0.9)
MONOCYTES # BLD AUTO: 0.93 K/UL — HIGH (ref 0–0.9)
MONOCYTES # BLD AUTO: 0.93 K/UL — HIGH (ref 0–0.9)
MONOCYTES # BLD AUTO: 0.94 K/UL — HIGH (ref 0–0.9)
MONOCYTES # BLD AUTO: 0.95 K/UL — HIGH (ref 0–0.9)
MONOCYTES # BLD AUTO: 0.96 K/UL — HIGH (ref 0–0.9)
MONOCYTES # BLD AUTO: 0.96 K/UL — HIGH (ref 0–0.9)
MONOCYTES # BLD AUTO: 0.97 K/UL — HIGH (ref 0–0.9)
MONOCYTES # BLD AUTO: 0.97 K/UL — HIGH (ref 0–0.9)
MONOCYTES # BLD AUTO: 0.99 K/UL — HIGH (ref 0–0.9)
MONOCYTES # BLD AUTO: 0.99 K/UL — HIGH (ref 0–0.9)
MONOCYTES # BLD AUTO: 1 K/UL — HIGH (ref 0–0.9)
MONOCYTES # BLD AUTO: 1 K/UL — HIGH (ref 0–0.9)
MONOCYTES # BLD AUTO: 1.05 K/UL — HIGH (ref 0–0.9)
MONOCYTES # BLD AUTO: 1.13 K/UL — HIGH (ref 0–0.9)
MONOCYTES # BLD AUTO: 1.19 K/UL — HIGH (ref 0–0.9)
MONOCYTES # BLD AUTO: 1.27 K/UL — HIGH (ref 0–0.9)
MONOCYTES NFR BLD AUTO: 0 % — LOW (ref 2–14)
MONOCYTES NFR BLD AUTO: 0.9 % — LOW (ref 2–14)
MONOCYTES NFR BLD AUTO: 10.1 % — SIGNIFICANT CHANGE UP (ref 2–14)
MONOCYTES NFR BLD AUTO: 10.1 % — SIGNIFICANT CHANGE UP (ref 2–14)
MONOCYTES NFR BLD AUTO: 10.6 % — SIGNIFICANT CHANGE UP (ref 2–14)
MONOCYTES NFR BLD AUTO: 10.8 % — SIGNIFICANT CHANGE UP (ref 2–14)
MONOCYTES NFR BLD AUTO: 12.4 % — SIGNIFICANT CHANGE UP (ref 2–14)
MONOCYTES NFR BLD AUTO: 12.4 % — SIGNIFICANT CHANGE UP (ref 2–14)
MONOCYTES NFR BLD AUTO: 13.1 % — SIGNIFICANT CHANGE UP (ref 2–14)
MONOCYTES NFR BLD AUTO: 13.1 % — SIGNIFICANT CHANGE UP (ref 2–14)
MONOCYTES NFR BLD AUTO: 13.2 % — SIGNIFICANT CHANGE UP (ref 2–14)
MONOCYTES NFR BLD AUTO: 13.3 % — SIGNIFICANT CHANGE UP (ref 2–14)
MONOCYTES NFR BLD AUTO: 13.3 % — SIGNIFICANT CHANGE UP (ref 2–14)
MONOCYTES NFR BLD AUTO: 2.7 % — SIGNIFICANT CHANGE UP (ref 2–14)
MONOCYTES NFR BLD AUTO: 4.4 % — SIGNIFICANT CHANGE UP (ref 2–14)
MONOCYTES NFR BLD AUTO: 5.3 % — SIGNIFICANT CHANGE UP (ref 2–14)
MONOCYTES NFR BLD AUTO: 5.9 % — SIGNIFICANT CHANGE UP (ref 2–14)
MONOCYTES NFR BLD AUTO: 6.2 % — SIGNIFICANT CHANGE UP (ref 2–14)
MONOCYTES NFR BLD AUTO: 6.4 % — SIGNIFICANT CHANGE UP (ref 2–14)
MONOCYTES NFR BLD AUTO: 6.4 % — SIGNIFICANT CHANGE UP (ref 2–14)
MONOCYTES NFR BLD AUTO: 6.6 % — SIGNIFICANT CHANGE UP (ref 2–14)
MONOCYTES NFR BLD AUTO: 7 % — SIGNIFICANT CHANGE UP (ref 2–14)
MONOCYTES NFR BLD AUTO: 7 % — SIGNIFICANT CHANGE UP (ref 2–14)
MONOCYTES NFR BLD AUTO: 7.6 % — SIGNIFICANT CHANGE UP (ref 2–14)
MONOCYTES NFR BLD AUTO: 7.6 % — SIGNIFICANT CHANGE UP (ref 2–14)
MONOCYTES NFR BLD AUTO: 7.9 % — SIGNIFICANT CHANGE UP (ref 2–14)
MONOCYTES NFR BLD AUTO: 8.3 % — SIGNIFICANT CHANGE UP (ref 2–14)
MONOCYTES NFR BLD AUTO: 8.3 % — SIGNIFICANT CHANGE UP (ref 2–14)
MONOCYTES NFR BLD AUTO: 8.9 % — SIGNIFICANT CHANGE UP (ref 2–14)
MONOCYTES NFR BLD AUTO: 9 % — SIGNIFICANT CHANGE UP (ref 2–14)
MONOCYTES NFR BLD AUTO: 9.6 % — SIGNIFICANT CHANGE UP (ref 2–14)
MONOCYTES NFR BLD AUTO: 9.7 % — SIGNIFICANT CHANGE UP (ref 2–14)
MONOCYTES NFR BLD AUTO: 9.8 % — SIGNIFICANT CHANGE UP (ref 2–14)
MONOCYTES NFR BLD AUTO: 9.8 % — SIGNIFICANT CHANGE UP (ref 2–14)
MONOCYTES NFR BLD AUTO: 9.9 % — SIGNIFICANT CHANGE UP (ref 2–14)
MONOCYTES NFR BLD AUTO: 9.9 % — SIGNIFICANT CHANGE UP (ref 2–14)
MPO AB + PR3 PNL SER: SIGNIFICANT CHANGE UP
MPO AB + PR3 PNL SER: SIGNIFICANT CHANGE UP
MRSA PCR RESULT.: NEGATIVE — SIGNIFICANT CHANGE UP
MRSA SPEC QL CULT: SIGNIFICANT CHANGE UP
MRSA SPEC QL CULT: SIGNIFICANT CHANGE UP
MSSA DNA SPEC QL NAA+PROBE: SIGNIFICANT CHANGE UP
MSSA DNA SPEC QL NAA+PROBE: SIGNIFICANT CHANGE UP
MYELOCYTES NFR BLD: 0.9 % — HIGH (ref 0–0)
MYELOCYTES NFR BLD: 1 % — HIGH (ref 0–0)
MYELOCYTES NFR BLD: 1 % — HIGH (ref 0–0)
MYELOCYTES NFR BLD: 1.8 % — HIGH (ref 0–0)
MYELOPEROXIDASE AB SER-ACNC: 6.2 UNITS — SIGNIFICANT CHANGE UP
MYELOPEROXIDASE AB SER-ACNC: 6.2 UNITS — SIGNIFICANT CHANGE UP
MYELOPEROXIDASE CELLS FLD QL: NEGATIVE — SIGNIFICANT CHANGE UP
MYELOPEROXIDASE CELLS FLD QL: NEGATIVE — SIGNIFICANT CHANGE UP
N MEN ISLT CULT: SIGNIFICANT CHANGE UP
N MEN ISLT CULT: SIGNIFICANT CHANGE UP
NEUTROPHILS # BLD AUTO: 10.04 K/UL — HIGH (ref 1.8–7.4)
NEUTROPHILS # BLD AUTO: 10.33 K/UL — HIGH (ref 1.8–7.4)
NEUTROPHILS # BLD AUTO: 10.33 K/UL — HIGH (ref 1.8–7.4)
NEUTROPHILS # BLD AUTO: 10.92 K/UL — HIGH (ref 1.8–7.4)
NEUTROPHILS # BLD AUTO: 11.17 K/UL — HIGH (ref 1.8–7.4)
NEUTROPHILS # BLD AUTO: 11.2 K/UL — HIGH (ref 1.8–7.4)
NEUTROPHILS # BLD AUTO: 11.23 K/UL — HIGH (ref 1.8–7.4)
NEUTROPHILS # BLD AUTO: 11.38 K/UL — HIGH (ref 1.8–7.4)
NEUTROPHILS # BLD AUTO: 11.84 K/UL — HIGH (ref 1.8–7.4)
NEUTROPHILS # BLD AUTO: 20.81 K/UL — HIGH (ref 1.8–7.4)
NEUTROPHILS # BLD AUTO: 4.47 K/UL — SIGNIFICANT CHANGE UP (ref 1.8–7.4)
NEUTROPHILS # BLD AUTO: 4.69 K/UL — SIGNIFICANT CHANGE UP (ref 1.8–7.4)
NEUTROPHILS # BLD AUTO: 4.77 K/UL — SIGNIFICANT CHANGE UP (ref 1.8–7.4)
NEUTROPHILS # BLD AUTO: 4.77 K/UL — SIGNIFICANT CHANGE UP (ref 1.8–7.4)
NEUTROPHILS # BLD AUTO: 4.85 K/UL — SIGNIFICANT CHANGE UP (ref 1.8–7.4)
NEUTROPHILS # BLD AUTO: 4.85 K/UL — SIGNIFICANT CHANGE UP (ref 1.8–7.4)
NEUTROPHILS # BLD AUTO: 4.88 K/UL — SIGNIFICANT CHANGE UP (ref 1.8–7.4)
NEUTROPHILS # BLD AUTO: 4.88 K/UL — SIGNIFICANT CHANGE UP (ref 1.8–7.4)
NEUTROPHILS # BLD AUTO: 4.95 K/UL — SIGNIFICANT CHANGE UP (ref 1.8–7.4)
NEUTROPHILS # BLD AUTO: 4.95 K/UL — SIGNIFICANT CHANGE UP (ref 1.8–7.4)
NEUTROPHILS # BLD AUTO: 5.22 K/UL — SIGNIFICANT CHANGE UP (ref 1.8–7.4)
NEUTROPHILS # BLD AUTO: 5.22 K/UL — SIGNIFICANT CHANGE UP (ref 1.8–7.4)
NEUTROPHILS # BLD AUTO: 5.28 K/UL — SIGNIFICANT CHANGE UP (ref 1.8–7.4)
NEUTROPHILS # BLD AUTO: 5.28 K/UL — SIGNIFICANT CHANGE UP (ref 1.8–7.4)
NEUTROPHILS # BLD AUTO: 5.58 K/UL — SIGNIFICANT CHANGE UP (ref 1.8–7.4)
NEUTROPHILS # BLD AUTO: 5.58 K/UL — SIGNIFICANT CHANGE UP (ref 1.8–7.4)
NEUTROPHILS # BLD AUTO: 6.27 K/UL — SIGNIFICANT CHANGE UP (ref 1.8–7.4)
NEUTROPHILS # BLD AUTO: 6.29 K/UL — SIGNIFICANT CHANGE UP (ref 1.8–7.4)
NEUTROPHILS # BLD AUTO: 6.29 K/UL — SIGNIFICANT CHANGE UP (ref 1.8–7.4)
NEUTROPHILS # BLD AUTO: 6.58 K/UL — SIGNIFICANT CHANGE UP (ref 1.8–7.4)
NEUTROPHILS # BLD AUTO: 6.58 K/UL — SIGNIFICANT CHANGE UP (ref 1.8–7.4)
NEUTROPHILS # BLD AUTO: 7.09 K/UL — SIGNIFICANT CHANGE UP (ref 1.8–7.4)
NEUTROPHILS # BLD AUTO: 7.52 K/UL — HIGH (ref 1.8–7.4)
NEUTROPHILS # BLD AUTO: 7.52 K/UL — HIGH (ref 1.8–7.4)
NEUTROPHILS # BLD AUTO: 7.66 K/UL — HIGH (ref 1.8–7.4)
NEUTROPHILS # BLD AUTO: 7.66 K/UL — HIGH (ref 1.8–7.4)
NEUTROPHILS # BLD AUTO: 7.97 K/UL — HIGH (ref 1.8–7.4)
NEUTROPHILS # BLD AUTO: 7.97 K/UL — HIGH (ref 1.8–7.4)
NEUTROPHILS # BLD AUTO: 8.04 K/UL — HIGH (ref 1.8–7.4)
NEUTROPHILS # BLD AUTO: 8.04 K/UL — HIGH (ref 1.8–7.4)
NEUTROPHILS # BLD AUTO: 8.2 K/UL — HIGH (ref 1.8–7.4)
NEUTROPHILS # BLD AUTO: 8.2 K/UL — HIGH (ref 1.8–7.4)
NEUTROPHILS # BLD AUTO: 8.22 K/UL — HIGH (ref 1.8–7.4)
NEUTROPHILS # BLD AUTO: 8.22 K/UL — HIGH (ref 1.8–7.4)
NEUTROPHILS # BLD AUTO: 8.9 K/UL — HIGH (ref 1.8–7.4)
NEUTROPHILS # BLD AUTO: 8.9 K/UL — HIGH (ref 1.8–7.4)
NEUTROPHILS # BLD AUTO: 9.21 K/UL — HIGH (ref 1.8–7.4)
NEUTROPHILS # BLD AUTO: 9.43 K/UL — HIGH (ref 1.8–7.4)
NEUTROPHILS NFR BLD AUTO: 63.5 % — SIGNIFICANT CHANGE UP (ref 43–77)
NEUTROPHILS NFR BLD AUTO: 65.4 % — SIGNIFICANT CHANGE UP (ref 43–77)
NEUTROPHILS NFR BLD AUTO: 65.4 % — SIGNIFICANT CHANGE UP (ref 43–77)
NEUTROPHILS NFR BLD AUTO: 67.8 % — SIGNIFICANT CHANGE UP (ref 43–77)
NEUTROPHILS NFR BLD AUTO: 68.6 % — SIGNIFICANT CHANGE UP (ref 43–77)
NEUTROPHILS NFR BLD AUTO: 68.6 % — SIGNIFICANT CHANGE UP (ref 43–77)
NEUTROPHILS NFR BLD AUTO: 68.8 % — SIGNIFICANT CHANGE UP (ref 43–77)
NEUTROPHILS NFR BLD AUTO: 68.8 % — SIGNIFICANT CHANGE UP (ref 43–77)
NEUTROPHILS NFR BLD AUTO: 69 % — SIGNIFICANT CHANGE UP (ref 43–77)
NEUTROPHILS NFR BLD AUTO: 69 % — SIGNIFICANT CHANGE UP (ref 43–77)
NEUTROPHILS NFR BLD AUTO: 70.6 % — SIGNIFICANT CHANGE UP (ref 43–77)
NEUTROPHILS NFR BLD AUTO: 70.6 % — SIGNIFICANT CHANGE UP (ref 43–77)
NEUTROPHILS NFR BLD AUTO: 71.4 % — SIGNIFICANT CHANGE UP (ref 43–77)
NEUTROPHILS NFR BLD AUTO: 71.4 % — SIGNIFICANT CHANGE UP (ref 43–77)
NEUTROPHILS NFR BLD AUTO: 72.2 % — SIGNIFICANT CHANGE UP (ref 43–77)
NEUTROPHILS NFR BLD AUTO: 72.2 % — SIGNIFICANT CHANGE UP (ref 43–77)
NEUTROPHILS NFR BLD AUTO: 73 % — SIGNIFICANT CHANGE UP (ref 43–77)
NEUTROPHILS NFR BLD AUTO: 73.7 % — SIGNIFICANT CHANGE UP (ref 43–77)
NEUTROPHILS NFR BLD AUTO: 73.7 % — SIGNIFICANT CHANGE UP (ref 43–77)
NEUTROPHILS NFR BLD AUTO: 74 % — SIGNIFICANT CHANGE UP (ref 43–77)
NEUTROPHILS NFR BLD AUTO: 74 % — SIGNIFICANT CHANGE UP (ref 43–77)
NEUTROPHILS NFR BLD AUTO: 74.1 % — SIGNIFICANT CHANGE UP (ref 43–77)
NEUTROPHILS NFR BLD AUTO: 74.1 % — SIGNIFICANT CHANGE UP (ref 43–77)
NEUTROPHILS NFR BLD AUTO: 76.1 % — SIGNIFICANT CHANGE UP (ref 43–77)
NEUTROPHILS NFR BLD AUTO: 76.1 % — SIGNIFICANT CHANGE UP (ref 43–77)
NEUTROPHILS NFR BLD AUTO: 77.2 % — HIGH (ref 43–77)
NEUTROPHILS NFR BLD AUTO: 77.2 % — HIGH (ref 43–77)
NEUTROPHILS NFR BLD AUTO: 77.9 % — HIGH (ref 43–77)
NEUTROPHILS NFR BLD AUTO: 78.8 % — HIGH (ref 43–77)
NEUTROPHILS NFR BLD AUTO: 79.2 % — HIGH (ref 43–77)
NEUTROPHILS NFR BLD AUTO: 79.2 % — HIGH (ref 43–77)
NEUTROPHILS NFR BLD AUTO: 80.1 % — HIGH (ref 43–77)
NEUTROPHILS NFR BLD AUTO: 80.1 % — HIGH (ref 43–77)
NEUTROPHILS NFR BLD AUTO: 81.4 % — HIGH (ref 43–77)
NEUTROPHILS NFR BLD AUTO: 82.9 % — HIGH (ref 43–77)
NEUTROPHILS NFR BLD AUTO: 84.1 % — HIGH (ref 43–77)
NEUTROPHILS NFR BLD AUTO: 84.8 % — HIGH (ref 43–77)
NEUTROPHILS NFR BLD AUTO: 85.3 % — HIGH (ref 43–77)
NEUTROPHILS NFR BLD AUTO: 86.6 % — HIGH (ref 43–77)
NEUTROPHILS NFR BLD AUTO: 87.7 % — HIGH (ref 43–77)
NEUTROPHILS NFR BLD AUTO: 88.3 % — HIGH (ref 43–77)
NEUTROPHILS NFR BLD AUTO: 90.3 % — HIGH (ref 43–77)
NEUTROPHILS NFR BLD AUTO: 90.3 % — HIGH (ref 43–77)
NEUTROPHILS NFR BLD AUTO: 91 % — HIGH (ref 43–77)
NEUTROPHILS NFR BLD AUTO: 91 % — HIGH (ref 43–77)
NEUTROPHILS NFR BLD AUTO: 93 % — HIGH (ref 43–77)
NEUTS BAND # BLD: 0.9 % — SIGNIFICANT CHANGE UP (ref 0–8)
NEUTS BAND # BLD: 0.9 % — SIGNIFICANT CHANGE UP (ref 0–8)
NEUTS BAND # BLD: 1 % — SIGNIFICANT CHANGE UP (ref 0–8)
NEUTS BAND # BLD: 1 % — SIGNIFICANT CHANGE UP (ref 0–8)
NEUTS BAND # BLD: 2.6 % — SIGNIFICANT CHANGE UP (ref 0–8)
NEUTS BAND # BLD: 3.5 % — SIGNIFICANT CHANGE UP (ref 0–8)
NEUTS BAND # BLD: 5.4 % — SIGNIFICANT CHANGE UP (ref 0–8)
NEUTS BAND # BLD: 6.2 % — SIGNIFICANT CHANGE UP (ref 0–8)
NEUTS BAND # BLD: 6.2 % — SIGNIFICANT CHANGE UP (ref 0–8)
NIGHT BLUE STAIN TISS: SIGNIFICANT CHANGE UP
NIGHT BLUE STAIN TISS: SIGNIFICANT CHANGE UP
NITRITE UR-MCNC: NEGATIVE — SIGNIFICANT CHANGE UP
NITRITE UR-MCNC: POSITIVE
NITRITE UR-MCNC: POSITIVE
NON HDL CHOLESTEROL: 116 MG/DL — SIGNIFICANT CHANGE UP
NON HDL CHOLESTEROL: 134 MG/DL — HIGH
NON HDL CHOLESTEROL: 134 MG/DL — HIGH
NON HDL CHOLESTEROL: 136 MG/DL — HIGH
NON HDL CHOLESTEROL: 136 MG/DL — HIGH
NON HDL CHOLESTEROL: 144 MG/DL — HIGH
NON HDL CHOLESTEROL: 155 MG/DL — HIGH
NON HDL CHOLESTEROL: 155 MG/DL — HIGH
NRBC # BLD: 0 /100 WBCS — SIGNIFICANT CHANGE UP (ref 0–0)
NRBC # BLD: 0 /100 — SIGNIFICANT CHANGE UP (ref 0–0)
NRBC # BLD: 0 /100 — SIGNIFICANT CHANGE UP (ref 0–0)
NRBC # BLD: 1 /100 WBCS — HIGH (ref 0–0)
NRBC # BLD: SIGNIFICANT CHANGE UP /100 WBCS (ref 0–0)
NRBC # BLD: SIGNIFICANT CHANGE UP /100 WBCS (ref 0–0)
NT-PROBNP SERPL-SCNC: 8011 PG/ML — HIGH (ref 0–300)
NT-PROBNP SERPL-SCNC: 8011 PG/ML — HIGH (ref 0–300)
O2 CT VFR BLD CALC: ABNORMAL
O2 CT VFR BLD CALC: ABNORMAL
OB PNL STL: POSITIVE
OB PNL STL: POSITIVE
OPIATES UR-MCNC: NEGATIVE — SIGNIFICANT CHANGE UP
OPIATES UR-MCNC: NEGATIVE — SIGNIFICANT CHANGE UP
ORGANISM # SPEC MICROSCOPIC CNT: ABNORMAL
ORGANISM # SPEC MICROSCOPIC CNT: SIGNIFICANT CHANGE UP
OSMOLALITY SERPL: 276 MOSM/KG — LOW (ref 280–301)
OSMOLALITY SERPL: 279 MOSM/KG — LOW (ref 280–301)
OSMOLALITY SERPL: 293 MOSM/KG — SIGNIFICANT CHANGE UP (ref 280–301)
OSMOLALITY SERPL: 293 MOSM/KG — SIGNIFICANT CHANGE UP (ref 280–301)
OSMOLALITY UR: 178 MOSM/KG — LOW (ref 300–900)
OSMOLALITY UR: 193 MOSM/KG — LOW (ref 300–900)
OSMOLALITY UR: 193 MOSM/KG — LOW (ref 300–900)
OSMOLALITY UR: 200 MOSM/KG — LOW (ref 300–900)
OSMOLALITY UR: 329 MOSM/KG — SIGNIFICANT CHANGE UP (ref 300–900)
OSMOLALITY UR: 338 MOSM/KG — SIGNIFICANT CHANGE UP (ref 300–900)
OSMOLALITY UR: 338 MOSM/KG — SIGNIFICANT CHANGE UP (ref 300–900)
OSMOLALITY UR: 344 MOSM/KG — SIGNIFICANT CHANGE UP (ref 300–900)
OSMOLALITY UR: 344 MOSM/KG — SIGNIFICANT CHANGE UP (ref 300–900)
OSMOLALITY UR: 352 MOSM/KG — SIGNIFICANT CHANGE UP (ref 300–900)
OSMOLALITY UR: 376 MOSM/KG — SIGNIFICANT CHANGE UP (ref 300–900)
OSMOLALITY UR: 376 MOSM/KG — SIGNIFICANT CHANGE UP (ref 300–900)
OSMOLALITY UR: 389 MOSM/KG — SIGNIFICANT CHANGE UP (ref 300–900)
OSMOLALITY UR: 391 MOSM/KG — SIGNIFICANT CHANGE UP (ref 300–900)
OSMOLALITY UR: 424 MOSM/KG — SIGNIFICANT CHANGE UP (ref 300–900)
OSMOLALITY UR: 508 MOSM/KG — SIGNIFICANT CHANGE UP (ref 300–900)
OSMOLALITY UR: 552 MOSM/KG — SIGNIFICANT CHANGE UP (ref 300–900)
OSMOLALITY UR: 553 MOSM/KG — SIGNIFICANT CHANGE UP (ref 300–900)
OSMOLALITY UR: 553 MOSM/KG — SIGNIFICANT CHANGE UP (ref 300–900)
OSMOLALITY UR: 596 MOSM/KG — SIGNIFICANT CHANGE UP (ref 300–900)
OSMOLALITY UR: 631 MOSM/KG — SIGNIFICANT CHANGE UP (ref 300–900)
OSMOLALITY UR: 631 MOSM/KG — SIGNIFICANT CHANGE UP (ref 300–900)
OVALOCYTES BLD QL SMEAR: SLIGHT — SIGNIFICANT CHANGE UP
OXYHGB MFR BLDA: 96 % — HIGH (ref 90–95)
OXYHGB MFR BLDA: 96.1 % — HIGH (ref 90–95)
OXYHGB MFR BLDA: 96.2 % — HIGH (ref 90–95)
OXYHGB MFR BLDA: 96.3 % — HIGH (ref 90–95)
OXYHGB MFR BLDA: 96.5 % — HIGH (ref 90–95)
OXYHGB MFR BLDA: 96.6 % — HIGH (ref 90–95)
P AERUGINOSA DNA BLD POS NAA+NON-PROBE: SIGNIFICANT CHANGE UP
P AERUGINOSA DNA BLD POS NAA+NON-PROBE: SIGNIFICANT CHANGE UP
P-ANCA SER-ACNC: POSITIVE
P-ANCA SER-ACNC: POSITIVE
PCO2 BLDA: 33 MMHG — LOW (ref 35–48)
PCO2 BLDA: 36 MMHG — SIGNIFICANT CHANGE UP (ref 35–48)
PCO2 BLDA: 37 MMHG — SIGNIFICANT CHANGE UP (ref 35–48)
PCO2 BLDA: 38 MMHG — SIGNIFICANT CHANGE UP (ref 35–48)
PCO2 BLDA: 38 MMHG — SIGNIFICANT CHANGE UP (ref 35–48)
PCO2 BLDA: 39 MMHG — SIGNIFICANT CHANGE UP (ref 35–48)
PCO2 BLDA: 39 MMHG — SIGNIFICANT CHANGE UP (ref 35–48)
PCO2 BLDA: 41 MMHG — SIGNIFICANT CHANGE UP (ref 35–48)
PCO2 BLDA: 43 MMHG — SIGNIFICANT CHANGE UP (ref 35–48)
PCO2 BLDA: 44 MMHG — SIGNIFICANT CHANGE UP (ref 35–48)
PCO2 BLDA: 46 MMHG — SIGNIFICANT CHANGE UP (ref 35–48)
PCO2 BLDA: 47 MMHG — SIGNIFICANT CHANGE UP (ref 35–48)
PCO2 BLDV: 45 MMHG — SIGNIFICANT CHANGE UP (ref 42–55)
PCP SPEC-MCNC: SIGNIFICANT CHANGE UP
PCP SPEC-MCNC: SIGNIFICANT CHANGE UP
PCP UR-MCNC: NEGATIVE — SIGNIFICANT CHANGE UP
PCP UR-MCNC: NEGATIVE — SIGNIFICANT CHANGE UP
PH BLDA: 7.33 — LOW (ref 7.35–7.45)
PH BLDA: 7.34 — LOW (ref 7.35–7.45)
PH BLDA: 7.35 — SIGNIFICANT CHANGE UP (ref 7.35–7.45)
PH BLDA: 7.36 — SIGNIFICANT CHANGE UP (ref 7.35–7.45)
PH BLDA: 7.38 — SIGNIFICANT CHANGE UP (ref 7.35–7.45)
PH BLDA: 7.38 — SIGNIFICANT CHANGE UP (ref 7.35–7.45)
PH BLDA: 7.39 — SIGNIFICANT CHANGE UP (ref 7.35–7.45)
PH BLDA: 7.39 — SIGNIFICANT CHANGE UP (ref 7.35–7.45)
PH BLDA: 7.42 — SIGNIFICANT CHANGE UP (ref 7.35–7.45)
PH BLDA: 7.45 — SIGNIFICANT CHANGE UP (ref 7.35–7.45)
PH BLDA: 7.46 — HIGH (ref 7.35–7.45)
PH BLDV: 7.31 — LOW (ref 7.32–7.43)
PH UR: 5.5 — SIGNIFICANT CHANGE UP (ref 5–8)
PH UR: 6 — SIGNIFICANT CHANGE UP (ref 5–8)
PH UR: 6.5 — SIGNIFICANT CHANGE UP (ref 5–8)
PH UR: 6.5 — SIGNIFICANT CHANGE UP (ref 5–8)
PH UR: 7 — SIGNIFICANT CHANGE UP (ref 5–8)
PH UR: 7.5 — SIGNIFICANT CHANGE UP (ref 5–8)
PH UR: 8.5 — HIGH (ref 5–8)
PH UR: 8.5 — HIGH (ref 5–8)
PHOSPHATE SERPL-MCNC: 1.6 MG/DL — LOW (ref 2.5–4.5)
PHOSPHATE SERPL-MCNC: 1.9 MG/DL — LOW (ref 2.5–4.5)
PHOSPHATE SERPL-MCNC: 11.4 MG/DL — HIGH (ref 2.5–4.5)
PHOSPHATE SERPL-MCNC: 11.4 MG/DL — HIGH (ref 2.5–4.5)
PHOSPHATE SERPL-MCNC: 2 MG/DL — LOW (ref 2.5–4.5)
PHOSPHATE SERPL-MCNC: 2.2 MG/DL — LOW (ref 2.5–4.5)
PHOSPHATE SERPL-MCNC: 2.3 MG/DL — LOW (ref 2.5–4.5)
PHOSPHATE SERPL-MCNC: 2.4 MG/DL — LOW (ref 2.5–4.5)
PHOSPHATE SERPL-MCNC: 2.5 MG/DL — SIGNIFICANT CHANGE UP (ref 2.5–4.5)
PHOSPHATE SERPL-MCNC: 2.6 MG/DL — SIGNIFICANT CHANGE UP (ref 2.5–4.5)
PHOSPHATE SERPL-MCNC: 2.7 MG/DL — SIGNIFICANT CHANGE UP (ref 2.5–4.5)
PHOSPHATE SERPL-MCNC: 2.9 MG/DL — SIGNIFICANT CHANGE UP (ref 2.5–4.5)
PHOSPHATE SERPL-MCNC: 3 MG/DL — SIGNIFICANT CHANGE UP (ref 2.5–4.5)
PHOSPHATE SERPL-MCNC: 3.1 MG/DL — SIGNIFICANT CHANGE UP (ref 2.5–4.5)
PHOSPHATE SERPL-MCNC: 3.2 MG/DL — SIGNIFICANT CHANGE UP (ref 2.5–4.5)
PHOSPHATE SERPL-MCNC: 3.3 MG/DL — SIGNIFICANT CHANGE UP (ref 2.5–4.5)
PHOSPHATE SERPL-MCNC: 3.4 MG/DL — SIGNIFICANT CHANGE UP (ref 2.5–4.5)
PHOSPHATE SERPL-MCNC: 3.5 MG/DL — SIGNIFICANT CHANGE UP (ref 2.5–4.5)
PHOSPHATE SERPL-MCNC: 3.6 MG/DL — SIGNIFICANT CHANGE UP (ref 2.5–4.5)
PHOSPHATE SERPL-MCNC: 3.7 MG/DL — SIGNIFICANT CHANGE UP (ref 2.5–4.5)
PHOSPHATE SERPL-MCNC: 3.8 MG/DL — SIGNIFICANT CHANGE UP (ref 2.5–4.5)
PHOSPHATE SERPL-MCNC: 3.9 MG/DL — SIGNIFICANT CHANGE UP (ref 2.5–4.5)
PHOSPHATE SERPL-MCNC: 4 MG/DL — SIGNIFICANT CHANGE UP (ref 2.5–4.5)
PHOSPHATE SERPL-MCNC: 4.1 MG/DL — SIGNIFICANT CHANGE UP (ref 2.5–4.5)
PHOSPHATE SERPL-MCNC: 4.2 MG/DL — SIGNIFICANT CHANGE UP (ref 2.5–4.5)
PHOSPHATE SERPL-MCNC: 4.3 MG/DL — SIGNIFICANT CHANGE UP (ref 2.5–4.5)
PHOSPHATE SERPL-MCNC: 4.4 MG/DL — SIGNIFICANT CHANGE UP (ref 2.5–4.5)
PHOSPHATE SERPL-MCNC: 4.6 MG/DL — HIGH (ref 2.5–4.5)
PHOSPHATE SERPL-MCNC: 4.7 MG/DL — HIGH (ref 2.5–4.5)
PHOSPHATE SERPL-MCNC: 4.9 MG/DL — HIGH (ref 2.5–4.5)
PHOSPHATE SERPL-MCNC: SIGNIFICANT CHANGE UP MG/DL (ref 2.5–4.5)
PLAT MORPH BLD: ABNORMAL
PLAT MORPH BLD: NORMAL — SIGNIFICANT CHANGE UP
PLATELET # BLD AUTO: 102 K/UL — LOW (ref 150–400)
PLATELET # BLD AUTO: 102 K/UL — LOW (ref 150–400)
PLATELET # BLD AUTO: 110 K/UL — LOW (ref 150–400)
PLATELET # BLD AUTO: 110 K/UL — LOW (ref 150–400)
PLATELET # BLD AUTO: 116 K/UL — LOW (ref 150–400)
PLATELET # BLD AUTO: 129 K/UL — LOW (ref 150–400)
PLATELET # BLD AUTO: 132 K/UL — LOW (ref 150–400)
PLATELET # BLD AUTO: 134 K/UL — LOW (ref 150–400)
PLATELET # BLD AUTO: 137 K/UL — LOW (ref 150–400)
PLATELET # BLD AUTO: 137 K/UL — LOW (ref 150–400)
PLATELET # BLD AUTO: 139 K/UL — LOW (ref 150–400)
PLATELET # BLD AUTO: 139 K/UL — LOW (ref 150–400)
PLATELET # BLD AUTO: 141 K/UL — LOW (ref 150–400)
PLATELET # BLD AUTO: 143 K/UL — LOW (ref 150–400)
PLATELET # BLD AUTO: 143 K/UL — LOW (ref 150–400)
PLATELET # BLD AUTO: 144 K/UL — LOW (ref 150–400)
PLATELET # BLD AUTO: 145 K/UL — LOW (ref 150–400)
PLATELET # BLD AUTO: 145 K/UL — LOW (ref 150–400)
PLATELET # BLD AUTO: 146 K/UL — LOW (ref 150–400)
PLATELET # BLD AUTO: 147 K/UL — LOW (ref 150–400)
PLATELET # BLD AUTO: 147 K/UL — LOW (ref 150–400)
PLATELET # BLD AUTO: 150 K/UL — SIGNIFICANT CHANGE UP (ref 150–400)
PLATELET # BLD AUTO: 150 K/UL — SIGNIFICANT CHANGE UP (ref 150–400)
PLATELET # BLD AUTO: 151 K/UL — SIGNIFICANT CHANGE UP (ref 150–400)
PLATELET # BLD AUTO: 152 K/UL — SIGNIFICANT CHANGE UP (ref 150–400)
PLATELET # BLD AUTO: 153 K/UL — SIGNIFICANT CHANGE UP (ref 150–400)
PLATELET # BLD AUTO: 153 K/UL — SIGNIFICANT CHANGE UP (ref 150–400)
PLATELET # BLD AUTO: 156 K/UL — SIGNIFICANT CHANGE UP (ref 150–400)
PLATELET # BLD AUTO: 157 K/UL — SIGNIFICANT CHANGE UP (ref 150–400)
PLATELET # BLD AUTO: 158 K/UL — SIGNIFICANT CHANGE UP (ref 150–400)
PLATELET # BLD AUTO: 158 K/UL — SIGNIFICANT CHANGE UP (ref 150–400)
PLATELET # BLD AUTO: 159 K/UL — SIGNIFICANT CHANGE UP (ref 150–400)
PLATELET # BLD AUTO: 160 K/UL — SIGNIFICANT CHANGE UP (ref 150–400)
PLATELET # BLD AUTO: 160 K/UL — SIGNIFICANT CHANGE UP (ref 150–400)
PLATELET # BLD AUTO: 161 K/UL — SIGNIFICANT CHANGE UP (ref 150–400)
PLATELET # BLD AUTO: 164 K/UL — SIGNIFICANT CHANGE UP (ref 150–400)
PLATELET # BLD AUTO: 167 K/UL — SIGNIFICANT CHANGE UP (ref 150–400)
PLATELET # BLD AUTO: 167 K/UL — SIGNIFICANT CHANGE UP (ref 150–400)
PLATELET # BLD AUTO: 168 K/UL — SIGNIFICANT CHANGE UP (ref 150–400)
PLATELET # BLD AUTO: 168 K/UL — SIGNIFICANT CHANGE UP (ref 150–400)
PLATELET # BLD AUTO: 170 K/UL — SIGNIFICANT CHANGE UP (ref 150–400)
PLATELET # BLD AUTO: 171 K/UL — SIGNIFICANT CHANGE UP (ref 150–400)
PLATELET # BLD AUTO: 172 K/UL — SIGNIFICANT CHANGE UP (ref 150–400)
PLATELET # BLD AUTO: 173 K/UL — SIGNIFICANT CHANGE UP (ref 150–400)
PLATELET # BLD AUTO: 177 K/UL — SIGNIFICANT CHANGE UP (ref 150–400)
PLATELET # BLD AUTO: 180 K/UL — SIGNIFICANT CHANGE UP (ref 150–400)
PLATELET # BLD AUTO: 182 K/UL — SIGNIFICANT CHANGE UP (ref 150–400)
PLATELET # BLD AUTO: 182 K/UL — SIGNIFICANT CHANGE UP (ref 150–400)
PLATELET # BLD AUTO: 183 K/UL — SIGNIFICANT CHANGE UP (ref 150–400)
PLATELET # BLD AUTO: 183 K/UL — SIGNIFICANT CHANGE UP (ref 150–400)
PLATELET # BLD AUTO: 185 K/UL — SIGNIFICANT CHANGE UP (ref 150–400)
PLATELET # BLD AUTO: 185 K/UL — SIGNIFICANT CHANGE UP (ref 150–400)
PLATELET # BLD AUTO: 187 K/UL — SIGNIFICANT CHANGE UP (ref 150–400)
PLATELET # BLD AUTO: 187 K/UL — SIGNIFICANT CHANGE UP (ref 150–400)
PLATELET # BLD AUTO: 188 K/UL — SIGNIFICANT CHANGE UP (ref 150–400)
PLATELET # BLD AUTO: 188 K/UL — SIGNIFICANT CHANGE UP (ref 150–400)
PLATELET # BLD AUTO: 189 K/UL — SIGNIFICANT CHANGE UP (ref 150–400)
PLATELET # BLD AUTO: 190 K/UL — SIGNIFICANT CHANGE UP (ref 150–400)
PLATELET # BLD AUTO: 194 K/UL — SIGNIFICANT CHANGE UP (ref 150–400)
PLATELET # BLD AUTO: 194 K/UL — SIGNIFICANT CHANGE UP (ref 150–400)
PLATELET # BLD AUTO: 195 K/UL — SIGNIFICANT CHANGE UP (ref 150–400)
PLATELET # BLD AUTO: 197 K/UL — SIGNIFICANT CHANGE UP (ref 150–400)
PLATELET # BLD AUTO: 197 K/UL — SIGNIFICANT CHANGE UP (ref 150–400)
PLATELET # BLD AUTO: 198 K/UL — SIGNIFICANT CHANGE UP (ref 150–400)
PLATELET # BLD AUTO: 199 K/UL — SIGNIFICANT CHANGE UP (ref 150–400)
PLATELET # BLD AUTO: 205 K/UL — SIGNIFICANT CHANGE UP (ref 150–400)
PLATELET # BLD AUTO: 205 K/UL — SIGNIFICANT CHANGE UP (ref 150–400)
PLATELET # BLD AUTO: 207 K/UL — SIGNIFICANT CHANGE UP (ref 150–400)
PLATELET # BLD AUTO: 211 K/UL — SIGNIFICANT CHANGE UP (ref 150–400)
PLATELET # BLD AUTO: 211 K/UL — SIGNIFICANT CHANGE UP (ref 150–400)
PLATELET # BLD AUTO: 212 K/UL — SIGNIFICANT CHANGE UP (ref 150–400)
PLATELET # BLD AUTO: 212 K/UL — SIGNIFICANT CHANGE UP (ref 150–400)
PLATELET # BLD AUTO: 214 K/UL — SIGNIFICANT CHANGE UP (ref 150–400)
PLATELET # BLD AUTO: 214 K/UL — SIGNIFICANT CHANGE UP (ref 150–400)
PLATELET # BLD AUTO: 215 K/UL — SIGNIFICANT CHANGE UP (ref 150–400)
PLATELET # BLD AUTO: 218 K/UL — SIGNIFICANT CHANGE UP (ref 150–400)
PLATELET # BLD AUTO: 218 K/UL — SIGNIFICANT CHANGE UP (ref 150–400)
PLATELET # BLD AUTO: 221 K/UL — SIGNIFICANT CHANGE UP (ref 150–400)
PLATELET # BLD AUTO: 222 K/UL — SIGNIFICANT CHANGE UP (ref 150–400)
PLATELET # BLD AUTO: 222 K/UL — SIGNIFICANT CHANGE UP (ref 150–400)
PLATELET # BLD AUTO: 225 K/UL — SIGNIFICANT CHANGE UP (ref 150–400)
PLATELET # BLD AUTO: 226 K/UL — SIGNIFICANT CHANGE UP (ref 150–400)
PLATELET # BLD AUTO: 226 K/UL — SIGNIFICANT CHANGE UP (ref 150–400)
PLATELET # BLD AUTO: 227 K/UL — SIGNIFICANT CHANGE UP (ref 150–400)
PLATELET # BLD AUTO: 229 K/UL — SIGNIFICANT CHANGE UP (ref 150–400)
PLATELET # BLD AUTO: 235 K/UL — SIGNIFICANT CHANGE UP (ref 150–400)
PLATELET # BLD AUTO: 236 K/UL — SIGNIFICANT CHANGE UP (ref 150–400)
PLATELET # BLD AUTO: 237 K/UL — SIGNIFICANT CHANGE UP (ref 150–400)
PLATELET # BLD AUTO: 237 K/UL — SIGNIFICANT CHANGE UP (ref 150–400)
PLATELET # BLD AUTO: 240 K/UL — SIGNIFICANT CHANGE UP (ref 150–400)
PLATELET # BLD AUTO: 240 K/UL — SIGNIFICANT CHANGE UP (ref 150–400)
PLATELET # BLD AUTO: 243 K/UL — SIGNIFICANT CHANGE UP (ref 150–400)
PLATELET # BLD AUTO: 246 K/UL — SIGNIFICANT CHANGE UP (ref 150–400)
PLATELET # BLD AUTO: 246 K/UL — SIGNIFICANT CHANGE UP (ref 150–400)
PLATELET # BLD AUTO: 248 K/UL — SIGNIFICANT CHANGE UP (ref 150–400)
PLATELET # BLD AUTO: 249 K/UL — SIGNIFICANT CHANGE UP (ref 150–400)
PLATELET # BLD AUTO: 249 K/UL — SIGNIFICANT CHANGE UP (ref 150–400)
PLATELET # BLD AUTO: 251 K/UL — SIGNIFICANT CHANGE UP (ref 150–400)
PLATELET # BLD AUTO: 251 K/UL — SIGNIFICANT CHANGE UP (ref 150–400)
PLATELET # BLD AUTO: 252 K/UL — SIGNIFICANT CHANGE UP (ref 150–400)
PLATELET # BLD AUTO: 252 K/UL — SIGNIFICANT CHANGE UP (ref 150–400)
PLATELET # BLD AUTO: 253 K/UL — SIGNIFICANT CHANGE UP (ref 150–400)
PLATELET # BLD AUTO: 255 K/UL — SIGNIFICANT CHANGE UP (ref 150–400)
PLATELET # BLD AUTO: 256 K/UL — SIGNIFICANT CHANGE UP (ref 150–400)
PLATELET # BLD AUTO: 256 K/UL — SIGNIFICANT CHANGE UP (ref 150–400)
PLATELET # BLD AUTO: 258 K/UL — SIGNIFICANT CHANGE UP (ref 150–400)
PLATELET # BLD AUTO: 260 K/UL — SIGNIFICANT CHANGE UP (ref 150–400)
PLATELET # BLD AUTO: 261 K/UL — SIGNIFICANT CHANGE UP (ref 150–400)
PLATELET # BLD AUTO: 264 K/UL — SIGNIFICANT CHANGE UP (ref 150–400)
PLATELET # BLD AUTO: 266 K/UL — SIGNIFICANT CHANGE UP (ref 150–400)
PLATELET # BLD AUTO: 269 K/UL — SIGNIFICANT CHANGE UP (ref 150–400)
PLATELET # BLD AUTO: 271 K/UL — SIGNIFICANT CHANGE UP (ref 150–400)
PLATELET # BLD AUTO: 272 K/UL — SIGNIFICANT CHANGE UP (ref 150–400)
PLATELET # BLD AUTO: 275 K/UL — SIGNIFICANT CHANGE UP (ref 150–400)
PLATELET # BLD AUTO: 276 K/UL — SIGNIFICANT CHANGE UP (ref 150–400)
PLATELET # BLD AUTO: 276 K/UL — SIGNIFICANT CHANGE UP (ref 150–400)
PLATELET # BLD AUTO: 279 K/UL — SIGNIFICANT CHANGE UP (ref 150–400)
PLATELET # BLD AUTO: 279 K/UL — SIGNIFICANT CHANGE UP (ref 150–400)
PLATELET # BLD AUTO: 282 K/UL — SIGNIFICANT CHANGE UP (ref 150–400)
PLATELET # BLD AUTO: 283 K/UL — SIGNIFICANT CHANGE UP (ref 150–400)
PLATELET # BLD AUTO: 289 K/UL — SIGNIFICANT CHANGE UP (ref 150–400)
PLATELET # BLD AUTO: 296 K/UL — SIGNIFICANT CHANGE UP (ref 150–400)
PLATELET # BLD AUTO: 296 K/UL — SIGNIFICANT CHANGE UP (ref 150–400)
PLATELET # BLD AUTO: 297 K/UL — SIGNIFICANT CHANGE UP (ref 150–400)
PLATELET # BLD AUTO: 303 K/UL — SIGNIFICANT CHANGE UP (ref 150–400)
PLATELET # BLD AUTO: 309 K/UL — SIGNIFICANT CHANGE UP (ref 150–400)
PLATELET # BLD AUTO: 309 K/UL — SIGNIFICANT CHANGE UP (ref 150–400)
PLATELET # BLD AUTO: 311 K/UL — SIGNIFICANT CHANGE UP (ref 150–400)
PLATELET # BLD AUTO: 311 K/UL — SIGNIFICANT CHANGE UP (ref 150–400)
PLATELET # BLD AUTO: 312 K/UL — SIGNIFICANT CHANGE UP (ref 150–400)
PLATELET # BLD AUTO: 316 K/UL — SIGNIFICANT CHANGE UP (ref 150–400)
PLATELET # BLD AUTO: 318 K/UL — SIGNIFICANT CHANGE UP (ref 150–400)
PLATELET # BLD AUTO: 322 K/UL — SIGNIFICANT CHANGE UP (ref 150–400)
PLATELET # BLD AUTO: 322 K/UL — SIGNIFICANT CHANGE UP (ref 150–400)
PLATELET # BLD AUTO: 327 K/UL — SIGNIFICANT CHANGE UP (ref 150–400)
PLATELET # BLD AUTO: 328 K/UL — SIGNIFICANT CHANGE UP (ref 150–400)
PLATELET # BLD AUTO: 330 K/UL — SIGNIFICANT CHANGE UP (ref 150–400)
PLATELET # BLD AUTO: 330 K/UL — SIGNIFICANT CHANGE UP (ref 150–400)
PLATELET # BLD AUTO: 332 K/UL — SIGNIFICANT CHANGE UP (ref 150–400)
PLATELET # BLD AUTO: 334 K/UL — SIGNIFICANT CHANGE UP (ref 150–400)
PLATELET # BLD AUTO: 335 K/UL — SIGNIFICANT CHANGE UP (ref 150–400)
PLATELET # BLD AUTO: 335 K/UL — SIGNIFICANT CHANGE UP (ref 150–400)
PLATELET # BLD AUTO: 336 K/UL — SIGNIFICANT CHANGE UP (ref 150–400)
PLATELET # BLD AUTO: 344 K/UL — SIGNIFICANT CHANGE UP (ref 150–400)
PLATELET # BLD AUTO: 347 K/UL — SIGNIFICANT CHANGE UP (ref 150–400)
PLATELET # BLD AUTO: 348 K/UL — SIGNIFICANT CHANGE UP (ref 150–400)
PLATELET # BLD AUTO: 350 K/UL — SIGNIFICANT CHANGE UP (ref 150–400)
PLATELET # BLD AUTO: 359 K/UL — SIGNIFICANT CHANGE UP (ref 150–400)
PLATELET # BLD AUTO: 362 K/UL — SIGNIFICANT CHANGE UP (ref 150–400)
PLATELET # BLD AUTO: 371 K/UL — SIGNIFICANT CHANGE UP (ref 150–400)
PLATELET # BLD AUTO: 387 K/UL — SIGNIFICANT CHANGE UP (ref 150–400)
PLATELET # BLD AUTO: 440 K/UL — HIGH (ref 150–400)
PLATELET # BLD AUTO: 450 K/UL — HIGH (ref 150–400)
PLATELET # BLD AUTO: 476 K/UL — HIGH (ref 150–400)
PLATELET # BLD AUTO: 481 K/UL — HIGH (ref 150–400)
PLATELET # BLD AUTO: 494 K/UL — HIGH (ref 150–400)
PLATELET # BLD AUTO: 506 K/UL — HIGH (ref 150–400)
PO2 BLDA: 100 MMHG — SIGNIFICANT CHANGE UP (ref 83–108)
PO2 BLDA: 105 MMHG — SIGNIFICANT CHANGE UP (ref 83–108)
PO2 BLDA: 107 MMHG — SIGNIFICANT CHANGE UP (ref 83–108)
PO2 BLDA: 110 MMHG — HIGH (ref 83–108)
PO2 BLDA: 113 MMHG — HIGH (ref 83–108)
PO2 BLDA: 117 MMHG — HIGH (ref 83–108)
PO2 BLDA: 125 MMHG — HIGH (ref 83–108)
PO2 BLDA: 161 MMHG — HIGH (ref 83–108)
PO2 BLDA: 164 MMHG — HIGH (ref 83–108)
PO2 BLDA: 189 MMHG — HIGH (ref 83–108)
PO2 BLDA: 191 MMHG — HIGH (ref 83–108)
PO2 BLDA: 209 MMHG — HIGH (ref 83–108)
PO2 BLDA: 210 MMHG — HIGH (ref 83–108)
PO2 BLDA: 72 MMHG — LOW (ref 83–108)
PO2 BLDA: 81 MMHG — LOW (ref 83–108)
PO2 BLDA: 82 MMHG — LOW (ref 83–108)
PO2 BLDA: 84 MMHG — SIGNIFICANT CHANGE UP (ref 83–108)
PO2 BLDA: 91 MMHG — SIGNIFICANT CHANGE UP (ref 83–108)
PO2 BLDA: 99 MMHG — SIGNIFICANT CHANGE UP (ref 83–108)
PO2 BLDV: 48 MMHG — HIGH (ref 25–45)
POIKILOCYTOSIS BLD QL AUTO: SLIGHT — SIGNIFICANT CHANGE UP
POLYCHROMASIA BLD QL SMEAR: SLIGHT — SIGNIFICANT CHANGE UP
POTASSIUM BLDA-SCNC: 4.2 MMOL/L — SIGNIFICANT CHANGE UP (ref 3.5–5.1)
POTASSIUM BLDA-SCNC: 4.3 MMOL/L — SIGNIFICANT CHANGE UP (ref 3.5–5.1)
POTASSIUM BLDA-SCNC: 4.5 MMOL/L — SIGNIFICANT CHANGE UP (ref 3.5–5.1)
POTASSIUM BLDA-SCNC: 4.5 MMOL/L — SIGNIFICANT CHANGE UP (ref 3.5–5.1)
POTASSIUM BLDA-SCNC: 4.9 MMOL/L — SIGNIFICANT CHANGE UP (ref 3.5–5.1)
POTASSIUM BLDA-SCNC: 5.3 MMOL/L — HIGH (ref 3.5–5.1)
POTASSIUM BLDV-SCNC: 4.3 MMOL/L — SIGNIFICANT CHANGE UP (ref 3.5–5.1)
POTASSIUM SERPL-MCNC: 3.3 MMOL/L — LOW (ref 3.5–5.3)
POTASSIUM SERPL-MCNC: 3.3 MMOL/L — LOW (ref 3.5–5.3)
POTASSIUM SERPL-MCNC: 3.4 MMOL/L — LOW (ref 3.5–5.3)
POTASSIUM SERPL-MCNC: 3.5 MMOL/L — SIGNIFICANT CHANGE UP (ref 3.5–5.3)
POTASSIUM SERPL-MCNC: 3.6 MMOL/L — SIGNIFICANT CHANGE UP (ref 3.5–5.3)
POTASSIUM SERPL-MCNC: 3.7 MMOL/L — SIGNIFICANT CHANGE UP (ref 3.5–5.3)
POTASSIUM SERPL-MCNC: 3.8 MMOL/L — SIGNIFICANT CHANGE UP (ref 3.5–5.3)
POTASSIUM SERPL-MCNC: 3.9 MMOL/L — SIGNIFICANT CHANGE UP (ref 3.5–5.3)
POTASSIUM SERPL-MCNC: 4 MMOL/L — SIGNIFICANT CHANGE UP (ref 3.5–5.3)
POTASSIUM SERPL-MCNC: 4.1 MMOL/L — SIGNIFICANT CHANGE UP (ref 3.5–5.3)
POTASSIUM SERPL-MCNC: 4.2 MMOL/L — SIGNIFICANT CHANGE UP (ref 3.5–5.3)
POTASSIUM SERPL-MCNC: 4.3 MMOL/L — SIGNIFICANT CHANGE UP (ref 3.5–5.3)
POTASSIUM SERPL-MCNC: 4.4 MMOL/L — SIGNIFICANT CHANGE UP (ref 3.5–5.3)
POTASSIUM SERPL-MCNC: 4.5 MMOL/L — SIGNIFICANT CHANGE UP (ref 3.5–5.3)
POTASSIUM SERPL-MCNC: 4.6 MMOL/L — SIGNIFICANT CHANGE UP (ref 3.5–5.3)
POTASSIUM SERPL-MCNC: 4.7 MMOL/L — SIGNIFICANT CHANGE UP (ref 3.5–5.3)
POTASSIUM SERPL-MCNC: 4.8 MMOL/L — SIGNIFICANT CHANGE UP (ref 3.5–5.3)
POTASSIUM SERPL-MCNC: 4.9 MMOL/L — SIGNIFICANT CHANGE UP (ref 3.5–5.3)
POTASSIUM SERPL-MCNC: 5 MMOL/L — SIGNIFICANT CHANGE UP (ref 3.5–5.3)
POTASSIUM SERPL-MCNC: 5.1 MMOL/L — SIGNIFICANT CHANGE UP (ref 3.5–5.3)
POTASSIUM SERPL-MCNC: 5.2 MMOL/L — SIGNIFICANT CHANGE UP (ref 3.5–5.3)
POTASSIUM SERPL-MCNC: 5.3 MMOL/L — SIGNIFICANT CHANGE UP (ref 3.5–5.3)
POTASSIUM SERPL-MCNC: 5.7 MMOL/L — HIGH (ref 3.5–5.3)
POTASSIUM SERPL-MCNC: 5.8 MMOL/L — HIGH (ref 3.5–5.3)
POTASSIUM SERPL-MCNC: 6.3 MMOL/L — CRITICAL HIGH (ref 3.5–5.3)
POTASSIUM SERPL-MCNC: 6.3 MMOL/L — CRITICAL HIGH (ref 3.5–5.3)
POTASSIUM SERPL-MCNC: SIGNIFICANT CHANGE UP (ref 3.5–5.3)
POTASSIUM SERPL-MCNC: SIGNIFICANT CHANGE UP (ref 3.5–5.3)
POTASSIUM SERPL-MCNC: SIGNIFICANT CHANGE UP MMOL/L (ref 3.5–5.3)
POTASSIUM SERPL-SCNC: 3.3 MMOL/L — LOW (ref 3.5–5.3)
POTASSIUM SERPL-SCNC: 3.3 MMOL/L — LOW (ref 3.5–5.3)
POTASSIUM SERPL-SCNC: 3.4 MMOL/L — LOW (ref 3.5–5.3)
POTASSIUM SERPL-SCNC: 3.5 MMOL/L — SIGNIFICANT CHANGE UP (ref 3.5–5.3)
POTASSIUM SERPL-SCNC: 3.6 MMOL/L — SIGNIFICANT CHANGE UP (ref 3.5–5.3)
POTASSIUM SERPL-SCNC: 3.7 MMOL/L — SIGNIFICANT CHANGE UP (ref 3.5–5.3)
POTASSIUM SERPL-SCNC: 3.8 MMOL/L — SIGNIFICANT CHANGE UP (ref 3.5–5.3)
POTASSIUM SERPL-SCNC: 3.9 MMOL/L — SIGNIFICANT CHANGE UP (ref 3.5–5.3)
POTASSIUM SERPL-SCNC: 4 MMOL/L — SIGNIFICANT CHANGE UP (ref 3.5–5.3)
POTASSIUM SERPL-SCNC: 4.1 MMOL/L — SIGNIFICANT CHANGE UP (ref 3.5–5.3)
POTASSIUM SERPL-SCNC: 4.2 MMOL/L — SIGNIFICANT CHANGE UP (ref 3.5–5.3)
POTASSIUM SERPL-SCNC: 4.3 MMOL/L — SIGNIFICANT CHANGE UP (ref 3.5–5.3)
POTASSIUM SERPL-SCNC: 4.4 MMOL/L — SIGNIFICANT CHANGE UP (ref 3.5–5.3)
POTASSIUM SERPL-SCNC: 4.5 MMOL/L — SIGNIFICANT CHANGE UP (ref 3.5–5.3)
POTASSIUM SERPL-SCNC: 4.6 MMOL/L — SIGNIFICANT CHANGE UP (ref 3.5–5.3)
POTASSIUM SERPL-SCNC: 4.7 MMOL/L — SIGNIFICANT CHANGE UP (ref 3.5–5.3)
POTASSIUM SERPL-SCNC: 4.8 MMOL/L — SIGNIFICANT CHANGE UP (ref 3.5–5.3)
POTASSIUM SERPL-SCNC: 4.9 MMOL/L — SIGNIFICANT CHANGE UP (ref 3.5–5.3)
POTASSIUM SERPL-SCNC: 5 MMOL/L — SIGNIFICANT CHANGE UP (ref 3.5–5.3)
POTASSIUM SERPL-SCNC: 5.1 MMOL/L — SIGNIFICANT CHANGE UP (ref 3.5–5.3)
POTASSIUM SERPL-SCNC: 5.2 MMOL/L — SIGNIFICANT CHANGE UP (ref 3.5–5.3)
POTASSIUM SERPL-SCNC: 5.3 MMOL/L — SIGNIFICANT CHANGE UP (ref 3.5–5.3)
POTASSIUM SERPL-SCNC: 5.7 MMOL/L — HIGH (ref 3.5–5.3)
POTASSIUM SERPL-SCNC: 5.8 MMOL/L — HIGH (ref 3.5–5.3)
POTASSIUM SERPL-SCNC: 6.3 MMOL/L — CRITICAL HIGH (ref 3.5–5.3)
POTASSIUM SERPL-SCNC: 6.3 MMOL/L — CRITICAL HIGH (ref 3.5–5.3)
POTASSIUM SERPL-SCNC: SIGNIFICANT CHANGE UP (ref 3.5–5.3)
POTASSIUM SERPL-SCNC: SIGNIFICANT CHANGE UP (ref 3.5–5.3)
POTASSIUM SERPL-SCNC: SIGNIFICANT CHANGE UP MMOL/L (ref 3.5–5.3)
POTASSIUM UR-SCNC: 11 MMOL/L — SIGNIFICANT CHANGE UP
POTASSIUM UR-SCNC: 11 MMOL/L — SIGNIFICANT CHANGE UP
POTASSIUM UR-SCNC: 18 MMOL/L — SIGNIFICANT CHANGE UP
POTASSIUM UR-SCNC: 19 MMOL/L — SIGNIFICANT CHANGE UP
POTASSIUM UR-SCNC: 20 MMOL/L — SIGNIFICANT CHANGE UP
POTASSIUM UR-SCNC: 22 MMOL/L — SIGNIFICANT CHANGE UP
POTASSIUM UR-SCNC: 22 MMOL/L — SIGNIFICANT CHANGE UP
POTASSIUM UR-SCNC: 23 MMOL/L — SIGNIFICANT CHANGE UP
POTASSIUM UR-SCNC: 23 MMOL/L — SIGNIFICANT CHANGE UP
POTASSIUM UR-SCNC: 3 MMOL/L — SIGNIFICANT CHANGE UP
POTASSIUM UR-SCNC: 3 MMOL/L — SIGNIFICANT CHANGE UP
POTASSIUM UR-SCNC: 30 MMOL/L — SIGNIFICANT CHANGE UP
POTASSIUM UR-SCNC: 31 MMOL/L — SIGNIFICANT CHANGE UP
POTASSIUM UR-SCNC: 31 MMOL/L — SIGNIFICANT CHANGE UP
POTASSIUM UR-SCNC: 37 MMOL/L — SIGNIFICANT CHANGE UP
POTASSIUM UR-SCNC: 38 MMOL/L — SIGNIFICANT CHANGE UP
POTASSIUM UR-SCNC: 38 MMOL/L — SIGNIFICANT CHANGE UP
POTASSIUM UR-SCNC: 39 MMOL/L — SIGNIFICANT CHANGE UP
POTASSIUM UR-SCNC: 39 MMOL/L — SIGNIFICANT CHANGE UP
POTASSIUM UR-SCNC: 43 MMOL/L — SIGNIFICANT CHANGE UP
POTASSIUM UR-SCNC: 5 MMOL/L — SIGNIFICANT CHANGE UP
POTASSIUM UR-SCNC: 8 MMOL/L — SIGNIFICANT CHANGE UP
POTASSIUM UR-SCNC: 8 MMOL/L — SIGNIFICANT CHANGE UP
POTASSIUM UR-SCNC: 9 MMOL/L — SIGNIFICANT CHANGE UP
PREALB SERPL-MCNC: 11 MG/DL — LOW (ref 20–40)
PREALB SERPL-MCNC: 11 MG/DL — LOW (ref 20–40)
PREALB SERPL-MCNC: 12 MG/DL — LOW (ref 20–40)
PREALB SERPL-MCNC: 12 MG/DL — LOW (ref 20–40)
PREALB SERPL-MCNC: 5 MG/DL — LOW (ref 20–40)
PREALB SERPL-MCNC: 5 MG/DL — LOW (ref 20–40)
PREALB SERPL-MCNC: 8 MG/DL — LOW (ref 20–40)
PREALB SERPL-MCNC: 8 MG/DL — LOW (ref 20–40)
PREALB SERPL-MCNC: 9 MG/DL — LOW (ref 20–40)
PREALB SERPL-MCNC: <3 MG/DL — LOW (ref 20–40)
PROCALCITONIN SERPL-MCNC: 0.99 NG/ML — HIGH (ref 0.02–0.1)
PROCALCITONIN SERPL-MCNC: 0.99 NG/ML — HIGH (ref 0.02–0.1)
PROT ?TM UR-MCNC: 105 MG/DL — HIGH (ref 0–12)
PROT ?TM UR-MCNC: 105 MG/DL — HIGH (ref 0–12)
PROT ?TM UR-MCNC: 114 MG/DL — HIGH (ref 0–12)
PROT ?TM UR-MCNC: 114 MG/DL — HIGH (ref 0–12)
PROT ?TM UR-MCNC: 126 MG/DL — HIGH (ref 0–12)
PROT ?TM UR-MCNC: 126 MG/DL — HIGH (ref 0–12)
PROT ?TM UR-MCNC: 16 MG/DL — HIGH (ref 0–12)
PROT ?TM UR-MCNC: 20 MG/DL — HIGH (ref 0–12)
PROT ?TM UR-MCNC: 22 MG/DL — HIGH (ref 0–12)
PROT ?TM UR-MCNC: 22 MG/DL — HIGH (ref 0–12)
PROT ?TM UR-MCNC: 28 MG/DL — HIGH (ref 0–12)
PROT ?TM UR-MCNC: 36 MG/DL — HIGH (ref 0–12)
PROT ?TM UR-MCNC: 38 MG/DL — HIGH (ref 0–12)
PROT ?TM UR-MCNC: 43 MG/DL — HIGH (ref 0–12)
PROT ?TM UR-MCNC: 43 MG/DL — HIGH (ref 0–12)
PROT ?TM UR-MCNC: 47 MG/DL — HIGH (ref 0–12)
PROT ?TM UR-MCNC: 50 MG/DL — HIGH (ref 0–12)
PROT ?TM UR-MCNC: 58 MG/DL — HIGH (ref 0–12)
PROT ?TM UR-MCNC: 7 MG/DL — SIGNIFICANT CHANGE UP (ref 0–12)
PROT ?TM UR-MCNC: 7 MG/DL — SIGNIFICANT CHANGE UP (ref 0–12)
PROT ?TM UR-MCNC: 85 MG/DL — HIGH (ref 0–12)
PROT ?TM UR-MCNC: <4 MG/DL — SIGNIFICANT CHANGE UP (ref 0–12)
PROT PATTERN 24H UR ELPH-IMP: SIGNIFICANT CHANGE UP
PROT PATTERN 24H UR ELPH-IMP: SIGNIFICANT CHANGE UP
PROT PATTERN SERPL ELPH-IMP: SIGNIFICANT CHANGE UP
PROT PATTERN SERPL ELPH-IMP: SIGNIFICANT CHANGE UP
PROT SERPL-MCNC: 4.3 G/DL — LOW (ref 6–8.3)
PROT SERPL-MCNC: 4.4 G/DL — LOW (ref 6–8.3)
PROT SERPL-MCNC: 4.7 G/DL — LOW (ref 6–8.3)
PROT SERPL-MCNC: 4.7 G/DL — LOW (ref 6–8.3)
PROT SERPL-MCNC: 4.8 G/DL — LOW (ref 6–8.3)
PROT SERPL-MCNC: 5.3 G/DL — LOW (ref 6–8.3)
PROT SERPL-MCNC: 5.4 G/DL — LOW (ref 6–8.3)
PROT SERPL-MCNC: 5.4 G/DL — LOW (ref 6–8.3)
PROT SERPL-MCNC: 5.6 G/DL — LOW (ref 6–8.3)
PROT SERPL-MCNC: 6 G/DL — SIGNIFICANT CHANGE UP (ref 6–8.3)
PROT SERPL-MCNC: 6 G/DL — SIGNIFICANT CHANGE UP (ref 6–8.3)
PROT SERPL-MCNC: 6.7 G/DL — SIGNIFICANT CHANGE UP (ref 6–8.3)
PROT SERPL-MCNC: 6.8 G/DL — SIGNIFICANT CHANGE UP (ref 6–8.3)
PROT SERPL-MCNC: 7 G/DL — SIGNIFICANT CHANGE UP (ref 6–8.3)
PROT SERPL-MCNC: 7 G/DL — SIGNIFICANT CHANGE UP (ref 6–8.3)
PROT SERPL-MCNC: 7.1 G/DL — SIGNIFICANT CHANGE UP (ref 6–8.3)
PROT SERPL-MCNC: 7.2 G/DL — SIGNIFICANT CHANGE UP (ref 6–8.3)
PROT SERPL-MCNC: 7.3 G/DL — SIGNIFICANT CHANGE UP (ref 6–8.3)
PROT SERPL-MCNC: 7.4 G/DL — SIGNIFICANT CHANGE UP (ref 6–8.3)
PROT SERPL-MCNC: 7.5 G/DL — SIGNIFICANT CHANGE UP (ref 6–8.3)
PROT SERPL-MCNC: 7.6 G/DL — SIGNIFICANT CHANGE UP (ref 6–8.3)
PROT SERPL-MCNC: 7.7 G/DL — SIGNIFICANT CHANGE UP (ref 6–8.3)
PROT SERPL-MCNC: 7.8 G/DL — SIGNIFICANT CHANGE UP (ref 6–8.3)
PROT SERPL-MCNC: 7.9 G/DL — SIGNIFICANT CHANGE UP (ref 6–8.3)
PROT SERPL-MCNC: 8 G/DL — SIGNIFICANT CHANGE UP (ref 6–8.3)
PROT SERPL-MCNC: 8.1 G/DL — SIGNIFICANT CHANGE UP (ref 6–8.3)
PROT SERPL-MCNC: 8.2 G/DL — SIGNIFICANT CHANGE UP (ref 6–8.3)
PROT SERPL-MCNC: 8.3 G/DL — SIGNIFICANT CHANGE UP (ref 6–8.3)
PROT SERPL-MCNC: 8.4 G/DL — HIGH (ref 6–8.3)
PROT SERPL-MCNC: 8.5 G/DL — HIGH (ref 6–8.3)
PROT SERPL-MCNC: 8.6 G/DL — HIGH (ref 6–8.3)
PROT SERPL-MCNC: 8.7 G/DL — HIGH (ref 6–8.3)
PROT SERPL-MCNC: 8.9 G/DL — HIGH (ref 6–8.3)
PROT UR-MCNC: 100 MG/DL
PROT UR-MCNC: 30 MG/DL
PROT UR-MCNC: ABNORMAL MG/DL
PROT UR-MCNC: NEGATIVE MG/DL — SIGNIFICANT CHANGE UP
PROT UR-MCNC: SIGNIFICANT CHANGE UP MG/DL
PROT UR-MCNC: SIGNIFICANT CHANGE UP MG/DL
PROT/CREAT UR-RTO: 0.3 RATIO — HIGH (ref 0–0.2)
PROT/CREAT UR-RTO: 0.5 RATIO — HIGH (ref 0–0.2)
PROT/CREAT UR-RTO: 0.6 RATIO — HIGH (ref 0–0.2)
PROT/CREAT UR-RTO: 0.6 RATIO — HIGH (ref 0–0.2)
PROT/CREAT UR-RTO: 0.7 RATIO — HIGH (ref 0–0.2)
PROT/CREAT UR-RTO: 0.7 RATIO — HIGH (ref 0–0.2)
PROT/CREAT UR-RTO: 0.8 RATIO — HIGH (ref 0–0.2)
PROT/CREAT UR-RTO: 0.9 RATIO — HIGH (ref 0–0.2)
PROT/CREAT UR-RTO: 1.4 RATIO — HIGH (ref 0–0.2)
PROT/CREAT UR-RTO: 1.7 RATIO — HIGH (ref 0–0.2)
PROT/CREAT UR-RTO: 1.7 RATIO — HIGH (ref 0–0.2)
PROT/CREAT UR-RTO: 2 RATIO — HIGH (ref 0–0.2)
PROT/CREAT UR-RTO: 2.1 RATIO — HIGH (ref 0–0.2)
PROT/CREAT UR-RTO: 2.1 RATIO — HIGH (ref 0–0.2)
PROT/CREAT UR-RTO: 2.3 RATIO — HIGH (ref 0–0.2)
PROT/CREAT UR-RTO: 2.3 RATIO — HIGH (ref 0–0.2)
PROT/CREAT UR-RTO: 4 RATIO — HIGH (ref 0–0.2)
PROT/CREAT UR-RTO: SIGNIFICANT CHANGE UP (ref 0–0.2)
PROTEIN QUANT CALC, URINE: 576 MG/24 H — HIGH (ref 50–100)
PROTEIN QUANT CALC, URINE: 576 MG/24 H — HIGH (ref 50–100)
PROTEINASE3 AB FLD-ACNC: 7.5 UNITS — SIGNIFICANT CHANGE UP
PROTEINASE3 AB FLD-ACNC: 7.5 UNITS — SIGNIFICANT CHANGE UP
PROTEINASE3 AB SER-ACNC: NEGATIVE — SIGNIFICANT CHANGE UP
PROTEINASE3 AB SER-ACNC: NEGATIVE — SIGNIFICANT CHANGE UP
PROTEUS SP DNA BLD POS QL NAA+NON-PROBE: SIGNIFICANT CHANGE UP
PROTEUS SP DNA BLD POS QL NAA+NON-PROBE: SIGNIFICANT CHANGE UP
PROTHROM AB SERPL-ACNC: 12.5 SEC — SIGNIFICANT CHANGE UP (ref 10.5–13.4)
PROTHROM AB SERPL-ACNC: 13.3 SEC — HIGH (ref 9.5–13)
PROTHROM AB SERPL-ACNC: 13.6 SEC — HIGH (ref 10.5–13.4)
PROTHROM AB SERPL-ACNC: 13.7 SEC — HIGH (ref 9.5–13)
PROTHROM AB SERPL-ACNC: 13.7 SEC — HIGH (ref 9.5–13)
PROTHROM AB SERPL-ACNC: 13.8 SEC — HIGH (ref 9.5–13)
PROTHROM AB SERPL-ACNC: 13.9 SEC — HIGH (ref 9.5–13)
PROTHROM AB SERPL-ACNC: 14 SEC — HIGH (ref 9.5–13)
PROTHROM AB SERPL-ACNC: 14.1 SEC — HIGH (ref 10.5–13.4)
PROTHROM AB SERPL-ACNC: 14.3 SEC — HIGH (ref 10.5–13.4)
PROTHROM AB SERPL-ACNC: 14.3 SEC — HIGH (ref 9.5–13)
PROTHROM AB SERPL-ACNC: 14.3 SEC — HIGH (ref 9.5–13)
PROTHROM AB SERPL-ACNC: 14.4 SEC — HIGH (ref 9.5–13)
PROTHROM AB SERPL-ACNC: 14.4 SEC — HIGH (ref 9.5–13)
PROTHROM AB SERPL-ACNC: 14.5 SEC — HIGH (ref 10.5–13.4)
PROTHROM AB SERPL-ACNC: 14.7 SEC — HIGH (ref 9.5–13)
PROTHROM AB SERPL-ACNC: 14.7 SEC — HIGH (ref 9.5–13)
PROTHROM AB SERPL-ACNC: 14.8 SEC — HIGH (ref 9.5–13)
PROTHROM AB SERPL-ACNC: 15 SEC — HIGH (ref 9.5–13)
PROTHROM AB SERPL-ACNC: 15 SEC — HIGH (ref 9.5–13)
PROTHROM AB SERPL-ACNC: 15.1 SEC — HIGH (ref 10.5–13.4)
PROTHROM AB SERPL-ACNC: 15.4 SEC — HIGH (ref 10.5–13.4)
PROTHROM AB SERPL-ACNC: 15.5 SEC — HIGH (ref 9.5–13)
PROTHROM AB SERPL-ACNC: 15.5 SEC — HIGH (ref 9.5–13)
PROTHROM AB SERPL-ACNC: 15.7 SEC — HIGH (ref 9.5–13)
PROTHROM AB SERPL-ACNC: 15.7 SEC — HIGH (ref 9.5–13)
PROTHROM AB SERPL-ACNC: 15.9 SEC — HIGH (ref 9.5–13)
PROTHROM AB SERPL-ACNC: 16.7 SEC — HIGH (ref 10.5–13.4)
PROTHROM AB SERPL-ACNC: 17.1 SEC — HIGH (ref 9.5–13)
PROTHROM AB SERPL-ACNC: 17.1 SEC — HIGH (ref 9.5–13)
PROTHROM AB SERPL-ACNC: 20.5 SEC — HIGH (ref 9.5–13)
PROTHROM AB SERPL-ACNC: 20.5 SEC — HIGH (ref 9.5–13)
RAPID RVP RESULT: SIGNIFICANT CHANGE UP
RBC # BLD: 1.92 M/UL — LOW (ref 4.2–5.8)
RBC # BLD: 1.92 M/UL — LOW (ref 4.2–5.8)
RBC # BLD: 1.98 M/UL — LOW (ref 4.2–5.8)
RBC # BLD: 1.98 M/UL — LOW (ref 4.2–5.8)
RBC # BLD: 2.28 M/UL — LOW (ref 4.2–5.8)
RBC # BLD: 2.28 M/UL — LOW (ref 4.2–5.8)
RBC # BLD: 2.29 M/UL — LOW (ref 4.2–5.8)
RBC # BLD: 2.29 M/UL — LOW (ref 4.2–5.8)
RBC # BLD: 2.34 M/UL — LOW (ref 4.2–5.8)
RBC # BLD: 2.37 M/UL — LOW (ref 4.2–5.8)
RBC # BLD: 2.37 M/UL — LOW (ref 4.2–5.8)
RBC # BLD: 2.38 M/UL — LOW (ref 4.2–5.8)
RBC # BLD: 2.38 M/UL — LOW (ref 4.2–5.8)
RBC # BLD: 2.39 M/UL — LOW (ref 4.2–5.8)
RBC # BLD: 2.39 M/UL — LOW (ref 4.2–5.8)
RBC # BLD: 2.4 M/UL — LOW (ref 4.2–5.8)
RBC # BLD: 2.4 M/UL — LOW (ref 4.2–5.8)
RBC # BLD: 2.42 M/UL — LOW (ref 4.2–5.8)
RBC # BLD: 2.42 M/UL — LOW (ref 4.2–5.8)
RBC # BLD: 2.44 M/UL — LOW (ref 4.2–5.8)
RBC # BLD: 2.44 M/UL — LOW (ref 4.2–5.8)
RBC # BLD: 2.49 M/UL — LOW (ref 4.2–5.8)
RBC # BLD: 2.49 M/UL — LOW (ref 4.2–5.8)
RBC # BLD: 2.5 M/UL — LOW (ref 4.2–5.8)
RBC # BLD: 2.5 M/UL — LOW (ref 4.2–5.8)
RBC # BLD: 2.51 M/UL — LOW (ref 4.2–5.8)
RBC # BLD: 2.51 M/UL — LOW (ref 4.2–5.8)
RBC # BLD: 2.52 M/UL — LOW (ref 4.2–5.8)
RBC # BLD: 2.53 M/UL — LOW (ref 4.2–5.8)
RBC # BLD: 2.54 M/UL — LOW (ref 4.2–5.8)
RBC # BLD: 2.54 M/UL — LOW (ref 4.2–5.8)
RBC # BLD: 2.55 M/UL — LOW (ref 4.2–5.8)
RBC # BLD: 2.56 M/UL — LOW (ref 4.2–5.8)
RBC # BLD: 2.57 M/UL — LOW (ref 4.2–5.8)
RBC # BLD: 2.57 M/UL — LOW (ref 4.2–5.8)
RBC # BLD: 2.61 M/UL — LOW (ref 4.2–5.8)
RBC # BLD: 2.62 M/UL — LOW (ref 4.2–5.8)
RBC # BLD: 2.63 M/UL — LOW (ref 4.2–5.8)
RBC # BLD: 2.63 M/UL — LOW (ref 4.2–5.8)
RBC # BLD: 2.64 M/UL — LOW (ref 4.2–5.8)
RBC # BLD: 2.65 M/UL — LOW (ref 4.2–5.8)
RBC # BLD: 2.66 M/UL — LOW (ref 4.2–5.8)
RBC # BLD: 2.67 M/UL — LOW (ref 4.2–5.8)
RBC # BLD: 2.68 M/UL — LOW (ref 4.2–5.8)
RBC # BLD: 2.69 M/UL — LOW (ref 4.2–5.8)
RBC # BLD: 2.69 M/UL — LOW (ref 4.2–5.8)
RBC # BLD: 2.71 M/UL — LOW (ref 4.2–5.8)
RBC # BLD: 2.72 M/UL — LOW (ref 4.2–5.8)
RBC # BLD: 2.73 M/UL — LOW (ref 4.2–5.8)
RBC # BLD: 2.74 M/UL — LOW (ref 4.2–5.8)
RBC # BLD: 2.75 M/UL — LOW (ref 4.2–5.8)
RBC # BLD: 2.76 M/UL — LOW (ref 4.2–5.8)
RBC # BLD: 2.77 M/UL — LOW (ref 4.2–5.8)
RBC # BLD: 2.78 M/UL — LOW (ref 4.2–5.8)
RBC # BLD: 2.78 M/UL — LOW (ref 4.2–5.8)
RBC # BLD: 2.79 M/UL — LOW (ref 4.2–5.8)
RBC # BLD: 2.8 M/UL — LOW (ref 4.2–5.8)
RBC # BLD: 2.81 M/UL — LOW (ref 4.2–5.8)
RBC # BLD: 2.82 M/UL — LOW (ref 4.2–5.8)
RBC # BLD: 2.83 M/UL — LOW (ref 4.2–5.8)
RBC # BLD: 2.85 M/UL — LOW (ref 4.2–5.8)
RBC # BLD: 2.85 M/UL — LOW (ref 4.2–5.8)
RBC # BLD: 2.87 M/UL — LOW (ref 4.2–5.8)
RBC # BLD: 2.88 M/UL — LOW (ref 4.2–5.8)
RBC # BLD: 2.88 M/UL — LOW (ref 4.2–5.8)
RBC # BLD: 2.89 M/UL — LOW (ref 4.2–5.8)
RBC # BLD: 2.9 M/UL — LOW (ref 4.2–5.8)
RBC # BLD: 2.91 M/UL — LOW (ref 4.2–5.8)
RBC # BLD: 2.92 M/UL — LOW (ref 4.2–5.8)
RBC # BLD: 2.95 M/UL — LOW (ref 4.2–5.8)
RBC # BLD: 2.95 M/UL — LOW (ref 4.2–5.8)
RBC # BLD: 2.96 M/UL — LOW (ref 4.2–5.8)
RBC # BLD: 2.97 M/UL — LOW (ref 4.2–5.8)
RBC # BLD: 2.98 M/UL — LOW (ref 4.2–5.8)
RBC # BLD: 2.99 M/UL — LOW (ref 4.2–5.8)
RBC # BLD: 3 M/UL — LOW (ref 4.2–5.8)
RBC # BLD: 3.01 M/UL — LOW (ref 4.2–5.8)
RBC # BLD: 3.01 M/UL — LOW (ref 4.2–5.8)
RBC # BLD: 3.02 M/UL — LOW (ref 4.2–5.8)
RBC # BLD: 3.04 M/UL — LOW (ref 4.2–5.8)
RBC # BLD: 3.04 M/UL — LOW (ref 4.2–5.8)
RBC # BLD: 3.05 M/UL — LOW (ref 4.2–5.8)
RBC # BLD: 3.07 M/UL — LOW (ref 4.2–5.8)
RBC # BLD: 3.07 M/UL — LOW (ref 4.2–5.8)
RBC # BLD: 3.08 M/UL — LOW (ref 4.2–5.8)
RBC # BLD: 3.09 M/UL — LOW (ref 4.2–5.8)
RBC # BLD: 3.1 M/UL — LOW (ref 4.2–5.8)
RBC # BLD: 3.11 M/UL — LOW (ref 4.2–5.8)
RBC # BLD: 3.11 M/UL — LOW (ref 4.2–5.8)
RBC # BLD: 3.12 M/UL — LOW (ref 4.2–5.8)
RBC # BLD: 3.12 M/UL — LOW (ref 4.2–5.8)
RBC # BLD: 3.13 M/UL — LOW (ref 4.2–5.8)
RBC # BLD: 3.14 M/UL — LOW (ref 4.2–5.8)
RBC # BLD: 3.15 M/UL — LOW (ref 4.2–5.8)
RBC # BLD: 3.16 M/UL — LOW (ref 4.2–5.8)
RBC # BLD: 3.16 M/UL — LOW (ref 4.2–5.8)
RBC # BLD: 3.18 M/UL — LOW (ref 4.2–5.8)
RBC # BLD: 3.19 M/UL — LOW (ref 4.2–5.8)
RBC # BLD: 3.19 M/UL — LOW (ref 4.2–5.8)
RBC # BLD: 3.2 M/UL — LOW (ref 4.2–5.8)
RBC # BLD: 3.21 M/UL — LOW (ref 4.2–5.8)
RBC # BLD: 3.22 M/UL — LOW (ref 4.2–5.8)
RBC # BLD: 3.23 M/UL — LOW (ref 4.2–5.8)
RBC # BLD: 3.25 M/UL — LOW (ref 4.2–5.8)
RBC # BLD: 3.27 M/UL — LOW (ref 4.2–5.8)
RBC # BLD: 3.28 M/UL — LOW (ref 4.2–5.8)
RBC # BLD: 3.29 M/UL — LOW (ref 4.2–5.8)
RBC # BLD: 3.31 M/UL — LOW (ref 4.2–5.8)
RBC # BLD: 3.31 M/UL — LOW (ref 4.2–5.8)
RBC # BLD: 3.32 M/UL — LOW (ref 4.2–5.8)
RBC # BLD: 3.35 M/UL — LOW (ref 4.2–5.8)
RBC # BLD: 3.37 M/UL — LOW (ref 4.2–5.8)
RBC # BLD: 3.37 M/UL — LOW (ref 4.2–5.8)
RBC # BLD: 3.47 M/UL — LOW (ref 4.2–5.8)
RBC # BLD: 3.48 M/UL — LOW (ref 4.2–5.8)
RBC # BLD: 3.5 M/UL — LOW (ref 4.2–5.8)
RBC # BLD: 3.53 M/UL — LOW (ref 4.2–5.8)
RBC # BLD: 3.67 M/UL — LOW (ref 4.2–5.8)
RBC # BLD: 3.68 M/UL — LOW (ref 4.2–5.8)
RBC # BLD: 3.88 M/UL — LOW (ref 4.2–5.8)
RBC # BLD: 3.99 M/UL — LOW (ref 4.2–5.8)
RBC # BLD: 4.02 M/UL — LOW (ref 4.2–5.8)
RBC # BLD: 4.09 M/UL — LOW (ref 4.2–5.8)
RBC # FLD: 13.5 % — SIGNIFICANT CHANGE UP (ref 10.3–14.5)
RBC # FLD: 13.7 % — SIGNIFICANT CHANGE UP (ref 10.3–14.5)
RBC # FLD: 13.8 % — SIGNIFICANT CHANGE UP (ref 10.3–14.5)
RBC # FLD: 14.3 % — SIGNIFICANT CHANGE UP (ref 10.3–14.5)
RBC # FLD: 15.5 % — HIGH (ref 10.3–14.5)
RBC # FLD: 15.8 % — HIGH (ref 10.3–14.5)
RBC # FLD: 15.9 % — HIGH (ref 10.3–14.5)
RBC # FLD: 16 % — HIGH (ref 10.3–14.5)
RBC # FLD: 16.1 % — HIGH (ref 10.3–14.5)
RBC # FLD: 16.2 % — HIGH (ref 10.3–14.5)
RBC # FLD: 16.3 % — HIGH (ref 10.3–14.5)
RBC # FLD: 16.5 % — HIGH (ref 10.3–14.5)
RBC # FLD: 16.6 % — HIGH (ref 10.3–14.5)
RBC # FLD: 16.7 % — HIGH (ref 10.3–14.5)
RBC # FLD: 16.8 % — HIGH (ref 10.3–14.5)
RBC # FLD: 16.9 % — HIGH (ref 10.3–14.5)
RBC # FLD: 17 % — HIGH (ref 10.3–14.5)
RBC # FLD: 17.1 % — HIGH (ref 10.3–14.5)
RBC # FLD: 17.2 % — HIGH (ref 10.3–14.5)
RBC # FLD: 17.3 % — HIGH (ref 10.3–14.5)
RBC # FLD: 17.4 % — HIGH (ref 10.3–14.5)
RBC # FLD: 17.5 % — HIGH (ref 10.3–14.5)
RBC # FLD: 17.6 % — HIGH (ref 10.3–14.5)
RBC # FLD: 17.7 % — HIGH (ref 10.3–14.5)
RBC # FLD: 17.8 % — HIGH (ref 10.3–14.5)
RBC # FLD: 17.9 % — HIGH (ref 10.3–14.5)
RBC # FLD: 18 % — HIGH (ref 10.3–14.5)
RBC # FLD: 18.1 % — HIGH (ref 10.3–14.5)
RBC # FLD: 18.2 % — HIGH (ref 10.3–14.5)
RBC # FLD: 18.2 % — HIGH (ref 10.3–14.5)
RBC # FLD: 18.3 % — HIGH (ref 10.3–14.5)
RBC # FLD: 18.4 % — HIGH (ref 10.3–14.5)
RBC # FLD: 18.5 % — HIGH (ref 10.3–14.5)
RBC # FLD: 18.7 % — HIGH (ref 10.3–14.5)
RBC # FLD: 18.8 % — HIGH (ref 10.3–14.5)
RBC # FLD: 18.9 % — HIGH (ref 10.3–14.5)
RBC # FLD: 19 % — HIGH (ref 10.3–14.5)
RBC # FLD: 19 % — HIGH (ref 10.3–14.5)
RBC # FLD: 19.1 % — HIGH (ref 10.3–14.5)
RBC # FLD: 19.2 % — HIGH (ref 10.3–14.5)
RBC # FLD: 19.3 % — HIGH (ref 10.3–14.5)
RBC # FLD: 19.4 % — HIGH (ref 10.3–14.5)
RBC # FLD: 19.4 % — HIGH (ref 10.3–14.5)
RBC # FLD: 19.5 % — HIGH (ref 10.3–14.5)
RBC # FLD: 19.6 % — HIGH (ref 10.3–14.5)
RBC # FLD: 19.8 % — HIGH (ref 10.3–14.5)
RBC # FLD: 20 % — HIGH (ref 10.3–14.5)
RBC # FLD: 20.1 % — HIGH (ref 10.3–14.5)
RBC # FLD: 20.2 % — HIGH (ref 10.3–14.5)
RBC # FLD: 20.4 % — HIGH (ref 10.3–14.5)
RBC # FLD: 20.6 % — HIGH (ref 10.3–14.5)
RBC # FLD: 21 % — HIGH (ref 10.3–14.5)
RBC # FLD: 21.1 % — HIGH (ref 10.3–14.5)
RBC # FLD: 21.2 % — HIGH (ref 10.3–14.5)
RBC # FLD: 21.3 % — HIGH (ref 10.3–14.5)
RBC # FLD: 21.3 % — HIGH (ref 10.3–14.5)
RBC # FLD: 21.8 % — HIGH (ref 10.3–14.5)
RBC BLD AUTO: ABNORMAL
RBC CASTS # UR COMP ASSIST: 0 /HPF — SIGNIFICANT CHANGE UP (ref 0–4)
RBC CASTS # UR COMP ASSIST: 0 /HPF — SIGNIFICANT CHANGE UP (ref 0–4)
RBC CASTS # UR COMP ASSIST: 1 /HPF — SIGNIFICANT CHANGE UP (ref 0–4)
RBC CASTS # UR COMP ASSIST: 1 /HPF — SIGNIFICANT CHANGE UP (ref 0–4)
RBC CASTS # UR COMP ASSIST: 200 /HPF — HIGH (ref 0–4)
RBC CASTS # UR COMP ASSIST: 200 /HPF — HIGH (ref 0–4)
RBC CASTS # UR COMP ASSIST: 28 /HPF — HIGH (ref 0–4)
RBC CASTS # UR COMP ASSIST: 28 /HPF — HIGH (ref 0–4)
RBC CASTS # UR COMP ASSIST: 4 /HPF — SIGNIFICANT CHANGE UP (ref 0–4)
RBC CASTS # UR COMP ASSIST: 4 /HPF — SIGNIFICANT CHANGE UP (ref 0–4)
RBC CASTS # UR COMP ASSIST: 90 /HPF — HIGH (ref 0–4)
RBC CASTS # UR COMP ASSIST: 90 /HPF — HIGH (ref 0–4)
RBC CASTS # UR COMP ASSIST: < 5 /HPF — SIGNIFICANT CHANGE UP
RBC CASTS # UR COMP ASSIST: > 10 /HPF
RBC CASTS # UR COMP ASSIST: ABNORMAL /HPF
RETICS #: 107.5 K/UL — SIGNIFICANT CHANGE UP (ref 25–125)
RETICS #: 107.5 K/UL — SIGNIFICANT CHANGE UP (ref 25–125)
RETICS #: 75.7 K/UL — SIGNIFICANT CHANGE UP (ref 25–125)
RETICS #: 75.7 K/UL — SIGNIFICANT CHANGE UP (ref 25–125)
RETICS/RBC NFR: 2.9 % — HIGH (ref 0.5–2.5)
RETICS/RBC NFR: 2.9 % — HIGH (ref 0.5–2.5)
RETICS/RBC NFR: 4.2 % — HIGH (ref 0.5–2.5)
RETICS/RBC NFR: 4.2 % — HIGH (ref 0.5–2.5)
RH IG SCN BLD-IMP: POSITIVE — SIGNIFICANT CHANGE UP
S AUREUS DNA NOSE QL NAA+PROBE: NEGATIVE — SIGNIFICANT CHANGE UP
S AUREUS DNA NOSE QL NAA+PROBE: POSITIVE
S AUREUS DNA NOSE QL NAA+PROBE: POSITIVE
S LUGDUNENSIS DNA SNV QL NAA+NON-PROBE: SIGNIFICANT CHANGE UP
S LUGDUNENSIS DNA SNV QL NAA+NON-PROBE: SIGNIFICANT CHANGE UP
S MARCESCENS DNA BLD POS NAA+NON-PROBE: SIGNIFICANT CHANGE UP
S MARCESCENS DNA BLD POS NAA+NON-PROBE: SIGNIFICANT CHANGE UP
S PNEUM DNA BLD POS QL NAA+NON-PROBE: SIGNIFICANT CHANGE UP
S PNEUM DNA BLD POS QL NAA+NON-PROBE: SIGNIFICANT CHANGE UP
S PYO DNA BLD POS QL NAA+NON-PROBE: SIGNIFICANT CHANGE UP
S PYO DNA BLD POS QL NAA+NON-PROBE: SIGNIFICANT CHANGE UP
SALMONELLA DNA BLD POS QL NAA+NON-PROBE: SIGNIFICANT CHANGE UP
SALMONELLA DNA BLD POS QL NAA+NON-PROBE: SIGNIFICANT CHANGE UP
SAO2 % BLDA: 96.9 % — SIGNIFICANT CHANGE UP (ref 94–98)
SAO2 % BLDA: 97.1 % — SIGNIFICANT CHANGE UP (ref 94–98)
SAO2 % BLDA: 97.1 % — SIGNIFICANT CHANGE UP (ref 94–98)
SAO2 % BLDA: 97.5 % — SIGNIFICANT CHANGE UP (ref 94–98)
SAO2 % BLDA: 97.8 % — SIGNIFICANT CHANGE UP (ref 94–98)
SAO2 % BLDA: 97.9 % — SIGNIFICANT CHANGE UP (ref 94–98)
SAO2 % BLDA: 98 % — SIGNIFICANT CHANGE UP (ref 94–98)
SAO2 % BLDA: 98.2 % — HIGH (ref 94–98)
SAO2 % BLDA: 98.4 % — HIGH (ref 94–98)
SAO2 % BLDA: 98.5 % — HIGH (ref 94–98)
SAO2 % BLDA: 98.5 % — HIGH (ref 94–98)
SAO2 % BLDA: 98.9 % — HIGH (ref 94–98)
SAO2 % BLDA: 99.2 % — HIGH (ref 94–98)
SAO2 % BLDA: 99.2 % — HIGH (ref 94–98)
SAO2 % BLDA: 99.6 % — HIGH (ref 94–98)
SAO2 % BLDV: 78.5 % — SIGNIFICANT CHANGE UP (ref 67–88)
SARS-COV-2 RNA SPEC QL NAA+PROBE: SIGNIFICANT CHANGE UP
SELENIUM SERPL-MCNC: 102 UG/L — SIGNIFICANT CHANGE UP (ref 93–198)
SELENIUM SERPL-MCNC: 102 UG/L — SIGNIFICANT CHANGE UP (ref 93–198)
SELENIUM SERPL-MCNC: 75 UG/L — LOW (ref 93–198)
SELENIUM SERPL-MCNC: 75 UG/L — LOW (ref 93–198)
SELENIUM SERPL-MCNC: 77 UG/L — LOW (ref 93–198)
SELENIUM SERPL-MCNC: 79 UG/L — LOW (ref 93–198)
SMOOTH MUSCLE AB SER-ACNC: SIGNIFICANT CHANGE UP
SMOOTH MUSCLE AB SER-ACNC: SIGNIFICANT CHANGE UP
SMUDGE CELLS # BLD: PRESENT — SIGNIFICANT CHANGE UP
SODIUM BLDA-SCNC: 131 MMOL/L — LOW (ref 136–145)
SODIUM BLDA-SCNC: 132 MMOL/L — LOW (ref 136–145)
SODIUM BLDA-SCNC: 133 MMOL/L — LOW (ref 136–145)
SODIUM BLDA-SCNC: 133 MMOL/L — LOW (ref 136–145)
SODIUM BLDA-SCNC: 135 MMOL/L — LOW (ref 136–145)
SODIUM BLDA-SCNC: 135 MMOL/L — LOW (ref 136–145)
SODIUM SERPL-SCNC: 122 MMOL/L — LOW (ref 135–145)
SODIUM SERPL-SCNC: 125 MMOL/L — LOW (ref 135–145)
SODIUM SERPL-SCNC: 125 MMOL/L — LOW (ref 135–145)
SODIUM SERPL-SCNC: 126 MMOL/L — LOW (ref 135–145)
SODIUM SERPL-SCNC: 127 MMOL/L — LOW (ref 135–145)
SODIUM SERPL-SCNC: 127 MMOL/L — LOW (ref 135–145)
SODIUM SERPL-SCNC: 128 MMOL/L — LOW (ref 135–145)
SODIUM SERPL-SCNC: 129 MMOL/L — LOW (ref 135–145)
SODIUM SERPL-SCNC: 130 MMOL/L — LOW (ref 135–145)
SODIUM SERPL-SCNC: 131 MMOL/L — LOW (ref 135–145)
SODIUM SERPL-SCNC: 132 MMOL/L — LOW (ref 135–145)
SODIUM SERPL-SCNC: 133 MMOL/L — LOW (ref 135–145)
SODIUM SERPL-SCNC: 134 MMOL/L — LOW (ref 135–145)
SODIUM SERPL-SCNC: 135 MMOL/L — SIGNIFICANT CHANGE UP (ref 135–145)
SODIUM SERPL-SCNC: 136 MMOL/L — SIGNIFICANT CHANGE UP (ref 135–145)
SODIUM SERPL-SCNC: 137 MMOL/L — SIGNIFICANT CHANGE UP (ref 135–145)
SODIUM SERPL-SCNC: 138 MMOL/L — SIGNIFICANT CHANGE UP (ref 135–145)
SODIUM SERPL-SCNC: 139 MMOL/L — SIGNIFICANT CHANGE UP (ref 135–145)
SODIUM SERPL-SCNC: 140 MMOL/L — SIGNIFICANT CHANGE UP (ref 135–145)
SODIUM SERPL-SCNC: 142 MMOL/L — SIGNIFICANT CHANGE UP (ref 135–145)
SODIUM SERPL-SCNC: 143 MMOL/L — SIGNIFICANT CHANGE UP (ref 135–145)
SODIUM SERPL-SCNC: 145 MMOL/L — SIGNIFICANT CHANGE UP (ref 135–145)
SODIUM SERPL-SCNC: 149 MMOL/L — HIGH (ref 135–145)
SODIUM SERPL-SCNC: 149 MMOL/L — HIGH (ref 135–145)
SODIUM SERPL-SCNC: SIGNIFICANT CHANGE UP (ref 135–145)
SODIUM UR-SCNC: 100 MMOL/L — SIGNIFICANT CHANGE UP
SODIUM UR-SCNC: 100 MMOL/L — SIGNIFICANT CHANGE UP
SODIUM UR-SCNC: 105 MMOL/L — SIGNIFICANT CHANGE UP
SODIUM UR-SCNC: 105 MMOL/L — SIGNIFICANT CHANGE UP
SODIUM UR-SCNC: 110 MMOL/L — SIGNIFICANT CHANGE UP
SODIUM UR-SCNC: 113 MMOL/L — SIGNIFICANT CHANGE UP
SODIUM UR-SCNC: 118 MMOL/L — SIGNIFICANT CHANGE UP
SODIUM UR-SCNC: 118 MMOL/L — SIGNIFICANT CHANGE UP
SODIUM UR-SCNC: 128 MMOL/L — SIGNIFICANT CHANGE UP
SODIUM UR-SCNC: 20 MMOL/L — SIGNIFICANT CHANGE UP
SODIUM UR-SCNC: 26 MMOL/L — SIGNIFICANT CHANGE UP
SODIUM UR-SCNC: 36 MMOL/L — SIGNIFICANT CHANGE UP
SODIUM UR-SCNC: 43 MMOL/L — SIGNIFICANT CHANGE UP
SODIUM UR-SCNC: 48 MMOL/L — SIGNIFICANT CHANGE UP
SODIUM UR-SCNC: 48 MMOL/L — SIGNIFICANT CHANGE UP
SODIUM UR-SCNC: 50 MMOL/L — SIGNIFICANT CHANGE UP
SODIUM UR-SCNC: 50 MMOL/L — SIGNIFICANT CHANGE UP
SODIUM UR-SCNC: 58 MMOL/L — SIGNIFICANT CHANGE UP
SODIUM UR-SCNC: 60 MMOL/L — SIGNIFICANT CHANGE UP
SODIUM UR-SCNC: 60 MMOL/L — SIGNIFICANT CHANGE UP
SODIUM UR-SCNC: 70 MMOL/L — SIGNIFICANT CHANGE UP
SODIUM UR-SCNC: 70 MMOL/L — SIGNIFICANT CHANGE UP
SODIUM UR-SCNC: 73 MMOL/L — SIGNIFICANT CHANGE UP
SODIUM UR-SCNC: 81 MMOL/L — SIGNIFICANT CHANGE UP
SODIUM UR-SCNC: 85 MMOL/L — SIGNIFICANT CHANGE UP
SODIUM UR-SCNC: 92 MMOL/L — SIGNIFICANT CHANGE UP
SODIUM UR-SCNC: 95 MMOL/L — SIGNIFICANT CHANGE UP
SODIUM UR-SCNC: <20 MMOL/L — SIGNIFICANT CHANGE UP
SP GR SPEC: 1.01 — SIGNIFICANT CHANGE UP (ref 1–1.03)
SP GR SPEC: 1.02 — SIGNIFICANT CHANGE UP (ref 1–1.03)
SP GR SPEC: 1.03 — SIGNIFICANT CHANGE UP (ref 1–1.03)
SP GR SPEC: >1.03 — HIGH (ref 1–1.03)
SP GR SPEC: >1.03 — HIGH (ref 1–1.03)
SP GR SPEC: >=1.03 — SIGNIFICANT CHANGE UP (ref 1–1.03)
SPECIMEN SOURCE: SIGNIFICANT CHANGE UP
SPHEROCYTES BLD QL SMEAR: SLIGHT — SIGNIFICANT CHANGE UP
SQUAMOUS # UR AUTO: 0 /HPF — SIGNIFICANT CHANGE UP (ref 0–5)
SQUAMOUS # UR AUTO: 0 /HPF — SIGNIFICANT CHANGE UP (ref 0–5)
SQUAMOUS # UR AUTO: 1 /HPF — SIGNIFICANT CHANGE UP (ref 0–5)
SQUAMOUS # UR AUTO: 1 /HPF — SIGNIFICANT CHANGE UP (ref 0–5)
SQUAMOUS # UR AUTO: 2 /HPF — SIGNIFICANT CHANGE UP (ref 0–5)
SQUAMOUS # UR AUTO: 4 /HPF — SIGNIFICANT CHANGE UP (ref 0–5)
SQUAMOUS # UR AUTO: 4 /HPF — SIGNIFICANT CHANGE UP (ref 0–5)
SQUAMOUS # UR AUTO: 7 /HPF — HIGH (ref 0–5)
SQUAMOUS # UR AUTO: 7 /HPF — HIGH (ref 0–5)
STOMATOCYTES BLD QL SMEAR: SLIGHT — SIGNIFICANT CHANGE UP
SURGICAL PATHOLOGY STUDY: SIGNIFICANT CHANGE UP
T3 SERPL-MCNC: 72 NG/DL — LOW (ref 80–200)
T3 SERPL-MCNC: 72 NG/DL — LOW (ref 80–200)
T4 AB SER-ACNC: 10.79 UG/DL — SIGNIFICANT CHANGE UP (ref 4.5–11.7)
T4 AB SER-ACNC: 10.79 UG/DL — SIGNIFICANT CHANGE UP (ref 4.5–11.7)
T4 AB SER-ACNC: 11.63 UG/DL — SIGNIFICANT CHANGE UP (ref 4.5–11.7)
T4 AB SER-ACNC: 11.63 UG/DL — SIGNIFICANT CHANGE UP (ref 4.5–11.7)
T4 AB SER-ACNC: 11.67 UG/DL — SIGNIFICANT CHANGE UP (ref 4.5–11.7)
T4 AB SER-ACNC: 11.67 UG/DL — SIGNIFICANT CHANGE UP (ref 4.5–11.7)
T4 AB SER-ACNC: 12.64 UG/DL — HIGH (ref 4.5–11.7)
T4 AB SER-ACNC: 13.42 UG/DL — HIGH (ref 4.5–11.7)
T4 AB SER-ACNC: 13.42 UG/DL — HIGH (ref 4.5–11.7)
T4 FREE SERPL-MCNC: 1.68 NG/DL — SIGNIFICANT CHANGE UP (ref 0.93–1.7)
T4 FREE SERPL-MCNC: 1.68 NG/DL — SIGNIFICANT CHANGE UP (ref 0.93–1.7)
TARGETS BLD QL SMEAR: SLIGHT — SIGNIFICANT CHANGE UP
THC UR QL: NEGATIVE — SIGNIFICANT CHANGE UP
THC UR QL: NEGATIVE — SIGNIFICANT CHANGE UP
TIBC SERPL-MCNC: 134 UG/DL — LOW (ref 220–430)
TIBC SERPL-MCNC: 134 UG/DL — LOW (ref 220–430)
TIBC SERPL-MCNC: 176 UG/DL — LOW (ref 220–430)
TIBC SERPL-MCNC: 176 UG/DL — LOW (ref 220–430)
TOTAL VOLUME - 24 HOUR: 1600 ML — SIGNIFICANT CHANGE UP
TOTAL VOLUME - 24 HOUR: 1600 ML — SIGNIFICANT CHANGE UP
TOTAL VOLUME - 24 HOUR: 2800 ML — SIGNIFICANT CHANGE UP
TOTAL VOLUME - 24 HOUR: 4325 ML — SIGNIFICANT CHANGE UP
TRANS CELLS #/AREA URNS HPF: PRESENT
TRANS CELLS #/AREA URNS HPF: PRESENT
TRANSFERRIN SERPL-MCNC: 140 MG/DL — LOW (ref 200–360)
TRANSFERRIN SERPL-MCNC: 140 MG/DL — LOW (ref 200–360)
TRIGL SERPL-MCNC: 112 MG/DL — SIGNIFICANT CHANGE UP
TRIGL SERPL-MCNC: 112 MG/DL — SIGNIFICANT CHANGE UP
TRIGL SERPL-MCNC: 113 MG/DL — SIGNIFICANT CHANGE UP
TRIGL SERPL-MCNC: 114 MG/DL — SIGNIFICANT CHANGE UP
TRIGL SERPL-MCNC: 115 MG/DL — SIGNIFICANT CHANGE UP
TRIGL SERPL-MCNC: 115 MG/DL — SIGNIFICANT CHANGE UP
TRIGL SERPL-MCNC: 123 MG/DL — SIGNIFICANT CHANGE UP
TRIGL SERPL-MCNC: 123 MG/DL — SIGNIFICANT CHANGE UP
TRIGL SERPL-MCNC: 131 MG/DL — SIGNIFICANT CHANGE UP
TRIGL SERPL-MCNC: 132 MG/DL — SIGNIFICANT CHANGE UP
TRIGL SERPL-MCNC: 134 MG/DL — SIGNIFICANT CHANGE UP
TRIGL SERPL-MCNC: 135 MG/DL — SIGNIFICANT CHANGE UP
TRIGL SERPL-MCNC: 135 MG/DL — SIGNIFICANT CHANGE UP
TRIGL SERPL-MCNC: 142 MG/DL — SIGNIFICANT CHANGE UP
TRIGL SERPL-MCNC: 143 MG/DL — SIGNIFICANT CHANGE UP
TRIGL SERPL-MCNC: 143 MG/DL — SIGNIFICANT CHANGE UP
TRIGL SERPL-MCNC: 144 MG/DL — SIGNIFICANT CHANGE UP
TRIGL SERPL-MCNC: 146 MG/DL — SIGNIFICANT CHANGE UP
TRIGL SERPL-MCNC: 146 MG/DL — SIGNIFICANT CHANGE UP
TRIGL SERPL-MCNC: 152 MG/DL — HIGH
TRIGL SERPL-MCNC: 156 MG/DL — HIGH
TRIGL SERPL-MCNC: 156 MG/DL — HIGH
TRIGL SERPL-MCNC: 167 MG/DL — HIGH
TRIGL SERPL-MCNC: 167 MG/DL — HIGH
TRIGL SERPL-MCNC: 169 MG/DL — HIGH
TRIGL SERPL-MCNC: 173 MG/DL — HIGH
TRIGL SERPL-MCNC: 176 MG/DL — HIGH
TRIGL SERPL-MCNC: 177 MG/DL — HIGH
TRIGL SERPL-MCNC: 182 MG/DL — HIGH
TRIGL SERPL-MCNC: 187 MG/DL — HIGH
TRIGL SERPL-MCNC: 196 MG/DL — HIGH
TRIGL SERPL-MCNC: 197 MG/DL — HIGH
TRIGL SERPL-MCNC: 205 MG/DL — HIGH
TRIGL SERPL-MCNC: 211 MG/DL — HIGH
TRIGL SERPL-MCNC: 215 MG/DL — HIGH
TRIGL SERPL-MCNC: 279 MG/DL — HIGH
TROPONIN T, HIGH SENSITIVITY RESULT: 57 NG/L — CRITICAL HIGH (ref 0–51)
TROPONIN T, HIGH SENSITIVITY RESULT: 58 NG/L — CRITICAL HIGH (ref 0–51)
TSH SERPL-MCNC: 0.62 UIU/ML — SIGNIFICANT CHANGE UP (ref 0.27–4.2)
TSH SERPL-MCNC: 0.62 UIU/ML — SIGNIFICANT CHANGE UP (ref 0.27–4.2)
TSH SERPL-MCNC: 0.91 UIU/ML — SIGNIFICANT CHANGE UP (ref 0.27–4.2)
TSH SERPL-MCNC: 0.91 UIU/ML — SIGNIFICANT CHANGE UP (ref 0.27–4.2)
TSH SERPL-MCNC: 1.11 UIU/ML — SIGNIFICANT CHANGE UP (ref 0.27–4.2)
TSH SERPL-MCNC: 1.11 UIU/ML — SIGNIFICANT CHANGE UP (ref 0.27–4.2)
TSH SERPL-MCNC: 1.43 UIU/ML — SIGNIFICANT CHANGE UP (ref 0.27–4.2)
TSH SERPL-MCNC: 1.43 UIU/ML — SIGNIFICANT CHANGE UP (ref 0.27–4.2)
TSH SERPL-MCNC: 2.02 UIU/ML — SIGNIFICANT CHANGE UP (ref 0.27–4.2)
TSH SERPL-MCNC: 2.18 UIU/ML — SIGNIFICANT CHANGE UP (ref 0.27–4.2)
TSH SERPL-MCNC: 2.65 UIU/ML — SIGNIFICANT CHANGE UP (ref 0.27–4.2)
TSH SERPL-MCNC: 2.65 UIU/ML — SIGNIFICANT CHANGE UP (ref 0.27–4.2)
TSH SERPL-MCNC: 9.17 UIU/ML — HIGH (ref 0.27–4.2)
UIBC SERPL-MCNC: 77 UG/DL — LOW (ref 110–370)
UIBC SERPL-MCNC: 77 UG/DL — LOW (ref 110–370)
UIBC SERPL-MCNC: 96 UG/DL — LOW (ref 110–370)
UIBC SERPL-MCNC: 96 UG/DL — LOW (ref 110–370)
URATE SERPL-MCNC: 1.6 MG/DL — LOW (ref 3.4–8.8)
URINE CREATININE CALCULATION: 0.8 G/24 H — LOW (ref 1–2)
URINE CREATININE CALCULATION: 0.8 G/24 H — LOW (ref 1–2)
URINE CREATININE CALCULATION: 1 G/24 H — SIGNIFICANT CHANGE UP (ref 1–2)
URINE CREATININE CALCULATION: 1.1 G/24 H — SIGNIFICANT CHANGE UP (ref 1–2)
UROBILINOGEN FLD QL: 0.2 E.U./DL — SIGNIFICANT CHANGE UP
UROBILINOGEN FLD QL: 0.2 MG/DL — SIGNIFICANT CHANGE UP (ref 0.2–1)
UROBILINOGEN FLD QL: 1 E.U./DL — SIGNIFICANT CHANGE UP
UROBILINOGEN FLD QL: 1 MG/DL — SIGNIFICANT CHANGE UP (ref 0.2–1)
UUN 24H UR-MRATE: 15 G/24H — SIGNIFICANT CHANGE UP (ref 6–17)
UUN 24H UR-MRATE: 16 G/24H — SIGNIFICANT CHANGE UP (ref 6–17)
UUN UR-MCNC: 1034 MG/DL — SIGNIFICANT CHANGE UP
UUN UR-MCNC: 1034 MG/DL — SIGNIFICANT CHANGE UP
UUN UR-MCNC: 214 MG/DL — SIGNIFICANT CHANGE UP
UUN UR-MCNC: 214 MG/DL — SIGNIFICANT CHANGE UP
UUN UR-MCNC: 311 MG/DL — SIGNIFICANT CHANGE UP
UUN UR-MCNC: 316 MG/DL — SIGNIFICANT CHANGE UP
UUN UR-MCNC: 413 MG/DL — SIGNIFICANT CHANGE UP
UUN UR-MCNC: 418 MG/DL — SIGNIFICANT CHANGE UP
UUN UR-MCNC: 418 MG/DL — SIGNIFICANT CHANGE UP
UUN UR-MCNC: 430 MG/DL — SIGNIFICANT CHANGE UP
UUN UR-MCNC: 477 MG/DL — SIGNIFICANT CHANGE UP
UUN UR-MCNC: 477 MG/DL — SIGNIFICANT CHANGE UP
UUN UR-MCNC: 532 MG/DL — SIGNIFICANT CHANGE UP
UUN UR-MCNC: 64 MG/DL — SIGNIFICANT CHANGE UP
UUN UR-MCNC: 710 MG/DL — SIGNIFICANT CHANGE UP
UUN UR-MCNC: 711 MG/DL — SIGNIFICANT CHANGE UP
UUN UR-MCNC: 76 MG/DL — SIGNIFICANT CHANGE UP
UUN UR-MCNC: 840 MG/DL — SIGNIFICANT CHANGE UP
UUN UR-MCNC: 859 MG/DL — SIGNIFICANT CHANGE UP
UUN UR-MCNC: 859 MG/DL — SIGNIFICANT CHANGE UP
UUN UR-MCNC: 863 MG/DL — SIGNIFICANT CHANGE UP
UUN UR-MCNC: 863 MG/DL — SIGNIFICANT CHANGE UP
UUN UR-MCNC: 91 MG/DL — SIGNIFICANT CHANGE UP
UUN UR-MCNC: 914 MG/DL — SIGNIFICANT CHANGE UP
UUN UR-MCNC: 914 MG/DL — SIGNIFICANT CHANGE UP
UUN UR-MCNC: 950 MG/DL — SIGNIFICANT CHANGE UP
UUN UR-MCNC: <3 MG/DL — SIGNIFICANT CHANGE UP
VARIANT LYMPHS # BLD: 0.9 % — SIGNIFICANT CHANGE UP (ref 0–6)
VARIANT LYMPHS # BLD: 0.9 % — SIGNIFICANT CHANGE UP (ref 0–6)
VIT B12 SERPL-MCNC: 974 PG/ML — SIGNIFICANT CHANGE UP (ref 232–1245)
VIT B12 SERPL-MCNC: 993 PG/ML — SIGNIFICANT CHANGE UP (ref 232–1245)
VIT B12 SERPL-MCNC: 993 PG/ML — SIGNIFICANT CHANGE UP (ref 232–1245)
VIT C SERPL-MCNC: 0.3 MG/DL — LOW (ref 0.4–2)
VIT C SERPL-MCNC: 0.4 MG/DL — SIGNIFICANT CHANGE UP (ref 0.4–2)
VIT C SERPL-MCNC: 0.4 MG/DL — SIGNIFICANT CHANGE UP (ref 0.4–2)
VIT D25+D1,25 OH+D1,25 PNL SERPL-MCNC: 6.1 PG/ML — LOW (ref 19.9–79.3)
VIT D25+D1,25 OH+D1,25 PNL SERPL-MCNC: 6.1 PG/ML — LOW (ref 19.9–79.3)
VIT D25+D1,25 OH+D1,25 PNL SERPL-MCNC: 8.4 PG/ML — LOW (ref 19.9–79.3)
VIT D25+D1,25 OH+D1,25 PNL SERPL-MCNC: 8.4 PG/ML — LOW (ref 19.9–79.3)
WBC # BLD: 1.32 K/UL — LOW (ref 3.8–10.5)
WBC # BLD: 10.08 K/UL — SIGNIFICANT CHANGE UP (ref 3.8–10.5)
WBC # BLD: 10.14 K/UL — SIGNIFICANT CHANGE UP (ref 3.8–10.5)
WBC # BLD: 10.14 K/UL — SIGNIFICANT CHANGE UP (ref 3.8–10.5)
WBC # BLD: 10.16 K/UL — SIGNIFICANT CHANGE UP (ref 3.8–10.5)
WBC # BLD: 10.19 K/UL — SIGNIFICANT CHANGE UP (ref 3.8–10.5)
WBC # BLD: 10.19 K/UL — SIGNIFICANT CHANGE UP (ref 3.8–10.5)
WBC # BLD: 10.26 K/UL — SIGNIFICANT CHANGE UP (ref 3.8–10.5)
WBC # BLD: 10.26 K/UL — SIGNIFICANT CHANGE UP (ref 3.8–10.5)
WBC # BLD: 10.28 K/UL — SIGNIFICANT CHANGE UP (ref 3.8–10.5)
WBC # BLD: 10.32 K/UL — SIGNIFICANT CHANGE UP (ref 3.8–10.5)
WBC # BLD: 10.32 K/UL — SIGNIFICANT CHANGE UP (ref 3.8–10.5)
WBC # BLD: 10.33 K/UL — SIGNIFICANT CHANGE UP (ref 3.8–10.5)
WBC # BLD: 10.42 K/UL — SIGNIFICANT CHANGE UP (ref 3.8–10.5)
WBC # BLD: 10.42 K/UL — SIGNIFICANT CHANGE UP (ref 3.8–10.5)
WBC # BLD: 10.48 K/UL — SIGNIFICANT CHANGE UP (ref 3.8–10.5)
WBC # BLD: 10.55 K/UL — HIGH (ref 3.8–10.5)
WBC # BLD: 10.56 K/UL — HIGH (ref 3.8–10.5)
WBC # BLD: 10.62 K/UL — HIGH (ref 3.8–10.5)
WBC # BLD: 10.7 K/UL — HIGH (ref 3.8–10.5)
WBC # BLD: 10.7 K/UL — HIGH (ref 3.8–10.5)
WBC # BLD: 10.76 K/UL — HIGH (ref 3.8–10.5)
WBC # BLD: 10.76 K/UL — HIGH (ref 3.8–10.5)
WBC # BLD: 10.77 K/UL — HIGH (ref 3.8–10.5)
WBC # BLD: 10.87 K/UL — HIGH (ref 3.8–10.5)
WBC # BLD: 10.91 K/UL — HIGH (ref 3.8–10.5)
WBC # BLD: 10.93 K/UL — HIGH (ref 3.8–10.5)
WBC # BLD: 11.02 K/UL — HIGH (ref 3.8–10.5)
WBC # BLD: 11.02 K/UL — HIGH (ref 3.8–10.5)
WBC # BLD: 11.07 K/UL — HIGH (ref 3.8–10.5)
WBC # BLD: 11.07 K/UL — HIGH (ref 3.8–10.5)
WBC # BLD: 11.19 K/UL — HIGH (ref 3.8–10.5)
WBC # BLD: 11.21 K/UL — HIGH (ref 3.8–10.5)
WBC # BLD: 11.24 K/UL — HIGH (ref 3.8–10.5)
WBC # BLD: 11.24 K/UL — HIGH (ref 3.8–10.5)
WBC # BLD: 11.25 K/UL — HIGH (ref 3.8–10.5)
WBC # BLD: 11.3 K/UL — HIGH (ref 3.8–10.5)
WBC # BLD: 11.3 K/UL — HIGH (ref 3.8–10.5)
WBC # BLD: 11.36 K/UL — HIGH (ref 3.8–10.5)
WBC # BLD: 11.36 K/UL — HIGH (ref 3.8–10.5)
WBC # BLD: 11.37 K/UL — HIGH (ref 3.8–10.5)
WBC # BLD: 11.42 K/UL — HIGH (ref 3.8–10.5)
WBC # BLD: 11.42 K/UL — HIGH (ref 3.8–10.5)
WBC # BLD: 11.43 K/UL — HIGH (ref 3.8–10.5)
WBC # BLD: 11.46 K/UL — HIGH (ref 3.8–10.5)
WBC # BLD: 11.53 K/UL — HIGH (ref 3.8–10.5)
WBC # BLD: 11.53 K/UL — HIGH (ref 3.8–10.5)
WBC # BLD: 11.77 K/UL — HIGH (ref 3.8–10.5)
WBC # BLD: 11.81 K/UL — HIGH (ref 3.8–10.5)
WBC # BLD: 11.92 K/UL — HIGH (ref 3.8–10.5)
WBC # BLD: 11.92 K/UL — HIGH (ref 3.8–10.5)
WBC # BLD: 12.09 K/UL — HIGH (ref 3.8–10.5)
WBC # BLD: 12.09 K/UL — HIGH (ref 3.8–10.5)
WBC # BLD: 12.1 K/UL — HIGH (ref 3.8–10.5)
WBC # BLD: 12.1 K/UL — HIGH (ref 3.8–10.5)
WBC # BLD: 12.11 K/UL — HIGH (ref 3.8–10.5)
WBC # BLD: 12.18 K/UL — HIGH (ref 3.8–10.5)
WBC # BLD: 12.18 K/UL — HIGH (ref 3.8–10.5)
WBC # BLD: 12.3 K/UL — HIGH (ref 3.8–10.5)
WBC # BLD: 12.3 K/UL — HIGH (ref 3.8–10.5)
WBC # BLD: 12.32 K/UL — HIGH (ref 3.8–10.5)
WBC # BLD: 12.34 K/UL — HIGH (ref 3.8–10.5)
WBC # BLD: 12.34 K/UL — HIGH (ref 3.8–10.5)
WBC # BLD: 12.4 K/UL — HIGH (ref 3.8–10.5)
WBC # BLD: 12.4 K/UL — HIGH (ref 3.8–10.5)
WBC # BLD: 12.56 K/UL — HIGH (ref 3.8–10.5)
WBC # BLD: 12.58 K/UL — HIGH (ref 3.8–10.5)
WBC # BLD: 12.6 K/UL — HIGH (ref 3.8–10.5)
WBC # BLD: 12.6 K/UL — HIGH (ref 3.8–10.5)
WBC # BLD: 12.66 K/UL — HIGH (ref 3.8–10.5)
WBC # BLD: 12.73 K/UL — HIGH (ref 3.8–10.5)
WBC # BLD: 12.79 K/UL — HIGH (ref 3.8–10.5)
WBC # BLD: 12.8 K/UL — HIGH (ref 3.8–10.5)
WBC # BLD: 12.93 K/UL — HIGH (ref 3.8–10.5)
WBC # BLD: 13.17 K/UL — HIGH (ref 3.8–10.5)
WBC # BLD: 13.28 K/UL — HIGH (ref 3.8–10.5)
WBC # BLD: 13.29 K/UL — HIGH (ref 3.8–10.5)
WBC # BLD: 13.3 K/UL — HIGH (ref 3.8–10.5)
WBC # BLD: 13.34 K/UL — HIGH (ref 3.8–10.5)
WBC # BLD: 13.35 K/UL — HIGH (ref 3.8–10.5)
WBC # BLD: 13.53 K/UL — HIGH (ref 3.8–10.5)
WBC # BLD: 13.53 K/UL — HIGH (ref 3.8–10.5)
WBC # BLD: 13.61 K/UL — HIGH (ref 3.8–10.5)
WBC # BLD: 13.67 K/UL — HIGH (ref 3.8–10.5)
WBC # BLD: 13.8 K/UL — HIGH (ref 3.8–10.5)
WBC # BLD: 13.88 K/UL — HIGH (ref 3.8–10.5)
WBC # BLD: 13.88 K/UL — HIGH (ref 3.8–10.5)
WBC # BLD: 14.24 K/UL — HIGH (ref 3.8–10.5)
WBC # BLD: 14.26 K/UL — HIGH (ref 3.8–10.5)
WBC # BLD: 14.28 K/UL — HIGH (ref 3.8–10.5)
WBC # BLD: 14.34 K/UL — HIGH (ref 3.8–10.5)
WBC # BLD: 14.42 K/UL — HIGH (ref 3.8–10.5)
WBC # BLD: 15.72 K/UL — HIGH (ref 3.8–10.5)
WBC # BLD: 16.33 K/UL — HIGH (ref 3.8–10.5)
WBC # BLD: 16.84 K/UL — HIGH (ref 3.8–10.5)
WBC # BLD: 16.86 K/UL — HIGH (ref 3.8–10.5)
WBC # BLD: 17.52 K/UL — HIGH (ref 3.8–10.5)
WBC # BLD: 18.23 K/UL — HIGH (ref 3.8–10.5)
WBC # BLD: 19.25 K/UL — HIGH (ref 3.8–10.5)
WBC # BLD: 19.65 K/UL — HIGH (ref 3.8–10.5)
WBC # BLD: 21.55 K/UL — HIGH (ref 3.8–10.5)
WBC # BLD: 22.05 K/UL — HIGH (ref 3.8–10.5)
WBC # BLD: 22.38 K/UL — HIGH (ref 3.8–10.5)
WBC # BLD: 23.31 K/UL — HIGH (ref 3.8–10.5)
WBC # BLD: 24.82 K/UL — HIGH (ref 3.8–10.5)
WBC # BLD: 27.14 K/UL — HIGH (ref 3.8–10.5)
WBC # BLD: 3.13 K/UL — LOW (ref 3.8–10.5)
WBC # BLD: 3.42 K/UL — LOW (ref 3.8–10.5)
WBC # BLD: 4.31 K/UL — SIGNIFICANT CHANGE UP (ref 3.8–10.5)
WBC # BLD: 4.7 K/UL — SIGNIFICANT CHANGE UP (ref 3.8–10.5)
WBC # BLD: 4.7 K/UL — SIGNIFICANT CHANGE UP (ref 3.8–10.5)
WBC # BLD: 4.99 K/UL — SIGNIFICANT CHANGE UP (ref 3.8–10.5)
WBC # BLD: 4.99 K/UL — SIGNIFICANT CHANGE UP (ref 3.8–10.5)
WBC # BLD: 5.22 K/UL — SIGNIFICANT CHANGE UP (ref 3.8–10.5)
WBC # BLD: 5.5 K/UL — SIGNIFICANT CHANGE UP (ref 3.8–10.5)
WBC # BLD: 5.5 K/UL — SIGNIFICANT CHANGE UP (ref 3.8–10.5)
WBC # BLD: 5.79 K/UL — SIGNIFICANT CHANGE UP (ref 3.8–10.5)
WBC # BLD: 5.79 K/UL — SIGNIFICANT CHANGE UP (ref 3.8–10.5)
WBC # BLD: 6.2 K/UL — SIGNIFICANT CHANGE UP (ref 3.8–10.5)
WBC # BLD: 6.2 K/UL — SIGNIFICANT CHANGE UP (ref 3.8–10.5)
WBC # BLD: 6.24 K/UL — SIGNIFICANT CHANGE UP (ref 3.8–10.5)
WBC # BLD: 6.24 K/UL — SIGNIFICANT CHANGE UP (ref 3.8–10.5)
WBC # BLD: 6.29 K/UL — SIGNIFICANT CHANGE UP (ref 3.8–10.5)
WBC # BLD: 6.29 K/UL — SIGNIFICANT CHANGE UP (ref 3.8–10.5)
WBC # BLD: 6.34 K/UL — SIGNIFICANT CHANGE UP (ref 3.8–10.5)
WBC # BLD: 6.34 K/UL — SIGNIFICANT CHANGE UP (ref 3.8–10.5)
WBC # BLD: 6.44 K/UL — SIGNIFICANT CHANGE UP (ref 3.8–10.5)
WBC # BLD: 6.6 K/UL — SIGNIFICANT CHANGE UP (ref 3.8–10.5)
WBC # BLD: 6.6 K/UL — SIGNIFICANT CHANGE UP (ref 3.8–10.5)
WBC # BLD: 6.61 K/UL — SIGNIFICANT CHANGE UP (ref 3.8–10.5)
WBC # BLD: 6.65 K/UL — SIGNIFICANT CHANGE UP (ref 3.8–10.5)
WBC # BLD: 6.65 K/UL — SIGNIFICANT CHANGE UP (ref 3.8–10.5)
WBC # BLD: 6.81 K/UL — SIGNIFICANT CHANGE UP (ref 3.8–10.5)
WBC # BLD: 6.83 K/UL — SIGNIFICANT CHANGE UP (ref 3.8–10.5)
WBC # BLD: 6.83 K/UL — SIGNIFICANT CHANGE UP (ref 3.8–10.5)
WBC # BLD: 6.86 K/UL — SIGNIFICANT CHANGE UP (ref 3.8–10.5)
WBC # BLD: 6.86 K/UL — SIGNIFICANT CHANGE UP (ref 3.8–10.5)
WBC # BLD: 6.91 K/UL — SIGNIFICANT CHANGE UP (ref 3.8–10.5)
WBC # BLD: 7.04 K/UL — SIGNIFICANT CHANGE UP (ref 3.8–10.5)
WBC # BLD: 7.15 K/UL — SIGNIFICANT CHANGE UP (ref 3.8–10.5)
WBC # BLD: 7.15 K/UL — SIGNIFICANT CHANGE UP (ref 3.8–10.5)
WBC # BLD: 7.19 K/UL — SIGNIFICANT CHANGE UP (ref 3.8–10.5)
WBC # BLD: 7.19 K/UL — SIGNIFICANT CHANGE UP (ref 3.8–10.5)
WBC # BLD: 7.31 K/UL — SIGNIFICANT CHANGE UP (ref 3.8–10.5)
WBC # BLD: 7.43 K/UL — SIGNIFICANT CHANGE UP (ref 3.8–10.5)
WBC # BLD: 7.46 K/UL — SIGNIFICANT CHANGE UP (ref 3.8–10.5)
WBC # BLD: 7.46 K/UL — SIGNIFICANT CHANGE UP (ref 3.8–10.5)
WBC # BLD: 7.51 K/UL — SIGNIFICANT CHANGE UP (ref 3.8–10.5)
WBC # BLD: 7.62 K/UL — SIGNIFICANT CHANGE UP (ref 3.8–10.5)
WBC # BLD: 7.62 K/UL — SIGNIFICANT CHANGE UP (ref 3.8–10.5)
WBC # BLD: 7.65 K/UL — SIGNIFICANT CHANGE UP (ref 3.8–10.5)
WBC # BLD: 7.67 K/UL — SIGNIFICANT CHANGE UP (ref 3.8–10.5)
WBC # BLD: 7.67 K/UL — SIGNIFICANT CHANGE UP (ref 3.8–10.5)
WBC # BLD: 7.7 K/UL — SIGNIFICANT CHANGE UP (ref 3.8–10.5)
WBC # BLD: 7.7 K/UL — SIGNIFICANT CHANGE UP (ref 3.8–10.5)
WBC # BLD: 7.73 K/UL — SIGNIFICANT CHANGE UP (ref 3.8–10.5)
WBC # BLD: 7.73 K/UL — SIGNIFICANT CHANGE UP (ref 3.8–10.5)
WBC # BLD: 7.85 K/UL — SIGNIFICANT CHANGE UP (ref 3.8–10.5)
WBC # BLD: 7.88 K/UL — SIGNIFICANT CHANGE UP (ref 3.8–10.5)
WBC # BLD: 7.91 K/UL — SIGNIFICANT CHANGE UP (ref 3.8–10.5)
WBC # BLD: 8.01 K/UL — SIGNIFICANT CHANGE UP (ref 3.8–10.5)
WBC # BLD: 8.01 K/UL — SIGNIFICANT CHANGE UP (ref 3.8–10.5)
WBC # BLD: 8.19 K/UL — SIGNIFICANT CHANGE UP (ref 3.8–10.5)
WBC # BLD: 8.31 K/UL — SIGNIFICANT CHANGE UP (ref 3.8–10.5)
WBC # BLD: 8.33 K/UL — SIGNIFICANT CHANGE UP (ref 3.8–10.5)
WBC # BLD: 8.33 K/UL — SIGNIFICANT CHANGE UP (ref 3.8–10.5)
WBC # BLD: 8.41 K/UL — SIGNIFICANT CHANGE UP (ref 3.8–10.5)
WBC # BLD: 8.43 K/UL — SIGNIFICANT CHANGE UP (ref 3.8–10.5)
WBC # BLD: 8.59 K/UL — SIGNIFICANT CHANGE UP (ref 3.8–10.5)
WBC # BLD: 8.76 K/UL — SIGNIFICANT CHANGE UP (ref 3.8–10.5)
WBC # BLD: 8.81 K/UL — SIGNIFICANT CHANGE UP (ref 3.8–10.5)
WBC # BLD: 8.81 K/UL — SIGNIFICANT CHANGE UP (ref 3.8–10.5)
WBC # BLD: 8.82 K/UL — SIGNIFICANT CHANGE UP (ref 3.8–10.5)
WBC # BLD: 8.82 K/UL — SIGNIFICANT CHANGE UP (ref 3.8–10.5)
WBC # BLD: 8.83 K/UL — SIGNIFICANT CHANGE UP (ref 3.8–10.5)
WBC # BLD: 8.89 K/UL — SIGNIFICANT CHANGE UP (ref 3.8–10.5)
WBC # BLD: 8.89 K/UL — SIGNIFICANT CHANGE UP (ref 3.8–10.5)
WBC # BLD: 8.91 K/UL — SIGNIFICANT CHANGE UP (ref 3.8–10.5)
WBC # BLD: 8.91 K/UL — SIGNIFICANT CHANGE UP (ref 3.8–10.5)
WBC # BLD: 8.93 K/UL — SIGNIFICANT CHANGE UP (ref 3.8–10.5)
WBC # BLD: 8.96 K/UL — SIGNIFICANT CHANGE UP (ref 3.8–10.5)
WBC # BLD: 8.97 K/UL — SIGNIFICANT CHANGE UP (ref 3.8–10.5)
WBC # BLD: 8.97 K/UL — SIGNIFICANT CHANGE UP (ref 3.8–10.5)
WBC # BLD: 9.16 K/UL — SIGNIFICANT CHANGE UP (ref 3.8–10.5)
WBC # BLD: 9.25 K/UL — SIGNIFICANT CHANGE UP (ref 3.8–10.5)
WBC # BLD: 9.26 K/UL — SIGNIFICANT CHANGE UP (ref 3.8–10.5)
WBC # BLD: 9.32 K/UL — SIGNIFICANT CHANGE UP (ref 3.8–10.5)
WBC # BLD: 9.32 K/UL — SIGNIFICANT CHANGE UP (ref 3.8–10.5)
WBC # BLD: 9.39 K/UL — SIGNIFICANT CHANGE UP (ref 3.8–10.5)
WBC # BLD: 9.48 K/UL — SIGNIFICANT CHANGE UP (ref 3.8–10.5)
WBC # BLD: 9.59 K/UL — SIGNIFICANT CHANGE UP (ref 3.8–10.5)
WBC # BLD: 9.64 K/UL — SIGNIFICANT CHANGE UP (ref 3.8–10.5)
WBC # BLD: 9.64 K/UL — SIGNIFICANT CHANGE UP (ref 3.8–10.5)
WBC # BLD: 9.72 K/UL — SIGNIFICANT CHANGE UP (ref 3.8–10.5)
WBC # BLD: 9.79 K/UL — SIGNIFICANT CHANGE UP (ref 3.8–10.5)
WBC # BLD: 9.79 K/UL — SIGNIFICANT CHANGE UP (ref 3.8–10.5)
WBC # BLD: 9.8 K/UL — SIGNIFICANT CHANGE UP (ref 3.8–10.5)
WBC # BLD: 9.81 K/UL — SIGNIFICANT CHANGE UP (ref 3.8–10.5)
WBC # BLD: 9.89 K/UL — SIGNIFICANT CHANGE UP (ref 3.8–10.5)
WBC # BLD: 9.89 K/UL — SIGNIFICANT CHANGE UP (ref 3.8–10.5)
WBC # BLD: 9.91 K/UL — SIGNIFICANT CHANGE UP (ref 3.8–10.5)
WBC # BLD: 9.98 K/UL — SIGNIFICANT CHANGE UP (ref 3.8–10.5)
WBC # FLD AUTO: 1.32 K/UL — LOW (ref 3.8–10.5)
WBC # FLD AUTO: 10.08 K/UL — SIGNIFICANT CHANGE UP (ref 3.8–10.5)
WBC # FLD AUTO: 10.14 K/UL — SIGNIFICANT CHANGE UP (ref 3.8–10.5)
WBC # FLD AUTO: 10.14 K/UL — SIGNIFICANT CHANGE UP (ref 3.8–10.5)
WBC # FLD AUTO: 10.16 K/UL — SIGNIFICANT CHANGE UP (ref 3.8–10.5)
WBC # FLD AUTO: 10.19 K/UL — SIGNIFICANT CHANGE UP (ref 3.8–10.5)
WBC # FLD AUTO: 10.19 K/UL — SIGNIFICANT CHANGE UP (ref 3.8–10.5)
WBC # FLD AUTO: 10.26 K/UL — SIGNIFICANT CHANGE UP (ref 3.8–10.5)
WBC # FLD AUTO: 10.26 K/UL — SIGNIFICANT CHANGE UP (ref 3.8–10.5)
WBC # FLD AUTO: 10.28 K/UL — SIGNIFICANT CHANGE UP (ref 3.8–10.5)
WBC # FLD AUTO: 10.32 K/UL — SIGNIFICANT CHANGE UP (ref 3.8–10.5)
WBC # FLD AUTO: 10.32 K/UL — SIGNIFICANT CHANGE UP (ref 3.8–10.5)
WBC # FLD AUTO: 10.33 K/UL — SIGNIFICANT CHANGE UP (ref 3.8–10.5)
WBC # FLD AUTO: 10.42 K/UL — SIGNIFICANT CHANGE UP (ref 3.8–10.5)
WBC # FLD AUTO: 10.42 K/UL — SIGNIFICANT CHANGE UP (ref 3.8–10.5)
WBC # FLD AUTO: 10.48 K/UL — SIGNIFICANT CHANGE UP (ref 3.8–10.5)
WBC # FLD AUTO: 10.55 K/UL — HIGH (ref 3.8–10.5)
WBC # FLD AUTO: 10.56 K/UL — HIGH (ref 3.8–10.5)
WBC # FLD AUTO: 10.62 K/UL — HIGH (ref 3.8–10.5)
WBC # FLD AUTO: 10.7 K/UL — HIGH (ref 3.8–10.5)
WBC # FLD AUTO: 10.7 K/UL — HIGH (ref 3.8–10.5)
WBC # FLD AUTO: 10.76 K/UL — HIGH (ref 3.8–10.5)
WBC # FLD AUTO: 10.76 K/UL — HIGH (ref 3.8–10.5)
WBC # FLD AUTO: 10.77 K/UL — HIGH (ref 3.8–10.5)
WBC # FLD AUTO: 10.87 K/UL — HIGH (ref 3.8–10.5)
WBC # FLD AUTO: 10.91 K/UL — HIGH (ref 3.8–10.5)
WBC # FLD AUTO: 10.93 K/UL — HIGH (ref 3.8–10.5)
WBC # FLD AUTO: 11.02 K/UL — HIGH (ref 3.8–10.5)
WBC # FLD AUTO: 11.02 K/UL — HIGH (ref 3.8–10.5)
WBC # FLD AUTO: 11.07 K/UL — HIGH (ref 3.8–10.5)
WBC # FLD AUTO: 11.07 K/UL — HIGH (ref 3.8–10.5)
WBC # FLD AUTO: 11.19 K/UL — HIGH (ref 3.8–10.5)
WBC # FLD AUTO: 11.21 K/UL — HIGH (ref 3.8–10.5)
WBC # FLD AUTO: 11.24 K/UL — HIGH (ref 3.8–10.5)
WBC # FLD AUTO: 11.24 K/UL — HIGH (ref 3.8–10.5)
WBC # FLD AUTO: 11.25 K/UL — HIGH (ref 3.8–10.5)
WBC # FLD AUTO: 11.3 K/UL — HIGH (ref 3.8–10.5)
WBC # FLD AUTO: 11.3 K/UL — HIGH (ref 3.8–10.5)
WBC # FLD AUTO: 11.36 K/UL — HIGH (ref 3.8–10.5)
WBC # FLD AUTO: 11.36 K/UL — HIGH (ref 3.8–10.5)
WBC # FLD AUTO: 11.37 K/UL — HIGH (ref 3.8–10.5)
WBC # FLD AUTO: 11.42 K/UL — HIGH (ref 3.8–10.5)
WBC # FLD AUTO: 11.42 K/UL — HIGH (ref 3.8–10.5)
WBC # FLD AUTO: 11.43 K/UL — HIGH (ref 3.8–10.5)
WBC # FLD AUTO: 11.46 K/UL — HIGH (ref 3.8–10.5)
WBC # FLD AUTO: 11.53 K/UL — HIGH (ref 3.8–10.5)
WBC # FLD AUTO: 11.53 K/UL — HIGH (ref 3.8–10.5)
WBC # FLD AUTO: 11.77 K/UL — HIGH (ref 3.8–10.5)
WBC # FLD AUTO: 11.81 K/UL — HIGH (ref 3.8–10.5)
WBC # FLD AUTO: 11.92 K/UL — HIGH (ref 3.8–10.5)
WBC # FLD AUTO: 11.92 K/UL — HIGH (ref 3.8–10.5)
WBC # FLD AUTO: 12.09 K/UL — HIGH (ref 3.8–10.5)
WBC # FLD AUTO: 12.09 K/UL — HIGH (ref 3.8–10.5)
WBC # FLD AUTO: 12.1 K/UL — HIGH (ref 3.8–10.5)
WBC # FLD AUTO: 12.1 K/UL — HIGH (ref 3.8–10.5)
WBC # FLD AUTO: 12.11 K/UL — HIGH (ref 3.8–10.5)
WBC # FLD AUTO: 12.18 K/UL — HIGH (ref 3.8–10.5)
WBC # FLD AUTO: 12.18 K/UL — HIGH (ref 3.8–10.5)
WBC # FLD AUTO: 12.3 K/UL — HIGH (ref 3.8–10.5)
WBC # FLD AUTO: 12.3 K/UL — HIGH (ref 3.8–10.5)
WBC # FLD AUTO: 12.32 K/UL — HIGH (ref 3.8–10.5)
WBC # FLD AUTO: 12.34 K/UL — HIGH (ref 3.8–10.5)
WBC # FLD AUTO: 12.34 K/UL — HIGH (ref 3.8–10.5)
WBC # FLD AUTO: 12.4 K/UL — HIGH (ref 3.8–10.5)
WBC # FLD AUTO: 12.4 K/UL — HIGH (ref 3.8–10.5)
WBC # FLD AUTO: 12.56 K/UL — HIGH (ref 3.8–10.5)
WBC # FLD AUTO: 12.58 K/UL — HIGH (ref 3.8–10.5)
WBC # FLD AUTO: 12.6 K/UL — HIGH (ref 3.8–10.5)
WBC # FLD AUTO: 12.6 K/UL — HIGH (ref 3.8–10.5)
WBC # FLD AUTO: 12.66 K/UL — HIGH (ref 3.8–10.5)
WBC # FLD AUTO: 12.73 K/UL — HIGH (ref 3.8–10.5)
WBC # FLD AUTO: 12.79 K/UL — HIGH (ref 3.8–10.5)
WBC # FLD AUTO: 12.8 K/UL — HIGH (ref 3.8–10.5)
WBC # FLD AUTO: 12.93 K/UL — HIGH (ref 3.8–10.5)
WBC # FLD AUTO: 13.17 K/UL — HIGH (ref 3.8–10.5)
WBC # FLD AUTO: 13.28 K/UL — HIGH (ref 3.8–10.5)
WBC # FLD AUTO: 13.29 K/UL — HIGH (ref 3.8–10.5)
WBC # FLD AUTO: 13.3 K/UL — HIGH (ref 3.8–10.5)
WBC # FLD AUTO: 13.34 K/UL — HIGH (ref 3.8–10.5)
WBC # FLD AUTO: 13.35 K/UL — HIGH (ref 3.8–10.5)
WBC # FLD AUTO: 13.53 K/UL — HIGH (ref 3.8–10.5)
WBC # FLD AUTO: 13.53 K/UL — HIGH (ref 3.8–10.5)
WBC # FLD AUTO: 13.61 K/UL — HIGH (ref 3.8–10.5)
WBC # FLD AUTO: 13.67 K/UL — HIGH (ref 3.8–10.5)
WBC # FLD AUTO: 13.8 K/UL — HIGH (ref 3.8–10.5)
WBC # FLD AUTO: 13.88 K/UL — HIGH (ref 3.8–10.5)
WBC # FLD AUTO: 13.88 K/UL — HIGH (ref 3.8–10.5)
WBC # FLD AUTO: 14.24 K/UL — HIGH (ref 3.8–10.5)
WBC # FLD AUTO: 14.26 K/UL — HIGH (ref 3.8–10.5)
WBC # FLD AUTO: 14.28 K/UL — HIGH (ref 3.8–10.5)
WBC # FLD AUTO: 14.34 K/UL — HIGH (ref 3.8–10.5)
WBC # FLD AUTO: 14.42 K/UL — HIGH (ref 3.8–10.5)
WBC # FLD AUTO: 15.72 K/UL — HIGH (ref 3.8–10.5)
WBC # FLD AUTO: 16.33 K/UL — HIGH (ref 3.8–10.5)
WBC # FLD AUTO: 16.84 K/UL — HIGH (ref 3.8–10.5)
WBC # FLD AUTO: 16.86 K/UL — HIGH (ref 3.8–10.5)
WBC # FLD AUTO: 17.52 K/UL — HIGH (ref 3.8–10.5)
WBC # FLD AUTO: 18.23 K/UL — HIGH (ref 3.8–10.5)
WBC # FLD AUTO: 19.25 K/UL — HIGH (ref 3.8–10.5)
WBC # FLD AUTO: 19.65 K/UL — HIGH (ref 3.8–10.5)
WBC # FLD AUTO: 21.55 K/UL — HIGH (ref 3.8–10.5)
WBC # FLD AUTO: 22.05 K/UL — HIGH (ref 3.8–10.5)
WBC # FLD AUTO: 22.38 K/UL — HIGH (ref 3.8–10.5)
WBC # FLD AUTO: 23.31 K/UL — HIGH (ref 3.8–10.5)
WBC # FLD AUTO: 24.82 K/UL — HIGH (ref 3.8–10.5)
WBC # FLD AUTO: 27.14 K/UL — HIGH (ref 3.8–10.5)
WBC # FLD AUTO: 3.13 K/UL — LOW (ref 3.8–10.5)
WBC # FLD AUTO: 3.42 K/UL — LOW (ref 3.8–10.5)
WBC # FLD AUTO: 4.31 K/UL — SIGNIFICANT CHANGE UP (ref 3.8–10.5)
WBC # FLD AUTO: 4.7 K/UL — SIGNIFICANT CHANGE UP (ref 3.8–10.5)
WBC # FLD AUTO: 4.7 K/UL — SIGNIFICANT CHANGE UP (ref 3.8–10.5)
WBC # FLD AUTO: 4.99 K/UL — SIGNIFICANT CHANGE UP (ref 3.8–10.5)
WBC # FLD AUTO: 4.99 K/UL — SIGNIFICANT CHANGE UP (ref 3.8–10.5)
WBC # FLD AUTO: 5.22 K/UL — SIGNIFICANT CHANGE UP (ref 3.8–10.5)
WBC # FLD AUTO: 5.5 K/UL — SIGNIFICANT CHANGE UP (ref 3.8–10.5)
WBC # FLD AUTO: 5.5 K/UL — SIGNIFICANT CHANGE UP (ref 3.8–10.5)
WBC # FLD AUTO: 5.79 K/UL — SIGNIFICANT CHANGE UP (ref 3.8–10.5)
WBC # FLD AUTO: 5.79 K/UL — SIGNIFICANT CHANGE UP (ref 3.8–10.5)
WBC # FLD AUTO: 6.2 K/UL — SIGNIFICANT CHANGE UP (ref 3.8–10.5)
WBC # FLD AUTO: 6.2 K/UL — SIGNIFICANT CHANGE UP (ref 3.8–10.5)
WBC # FLD AUTO: 6.24 K/UL — SIGNIFICANT CHANGE UP (ref 3.8–10.5)
WBC # FLD AUTO: 6.24 K/UL — SIGNIFICANT CHANGE UP (ref 3.8–10.5)
WBC # FLD AUTO: 6.29 K/UL — SIGNIFICANT CHANGE UP (ref 3.8–10.5)
WBC # FLD AUTO: 6.29 K/UL — SIGNIFICANT CHANGE UP (ref 3.8–10.5)
WBC # FLD AUTO: 6.34 K/UL — SIGNIFICANT CHANGE UP (ref 3.8–10.5)
WBC # FLD AUTO: 6.34 K/UL — SIGNIFICANT CHANGE UP (ref 3.8–10.5)
WBC # FLD AUTO: 6.44 K/UL — SIGNIFICANT CHANGE UP (ref 3.8–10.5)
WBC # FLD AUTO: 6.6 K/UL — SIGNIFICANT CHANGE UP (ref 3.8–10.5)
WBC # FLD AUTO: 6.6 K/UL — SIGNIFICANT CHANGE UP (ref 3.8–10.5)
WBC # FLD AUTO: 6.61 K/UL — SIGNIFICANT CHANGE UP (ref 3.8–10.5)
WBC # FLD AUTO: 6.65 K/UL — SIGNIFICANT CHANGE UP (ref 3.8–10.5)
WBC # FLD AUTO: 6.65 K/UL — SIGNIFICANT CHANGE UP (ref 3.8–10.5)
WBC # FLD AUTO: 6.81 K/UL — SIGNIFICANT CHANGE UP (ref 3.8–10.5)
WBC # FLD AUTO: 6.83 K/UL — SIGNIFICANT CHANGE UP (ref 3.8–10.5)
WBC # FLD AUTO: 6.83 K/UL — SIGNIFICANT CHANGE UP (ref 3.8–10.5)
WBC # FLD AUTO: 6.86 K/UL — SIGNIFICANT CHANGE UP (ref 3.8–10.5)
WBC # FLD AUTO: 6.86 K/UL — SIGNIFICANT CHANGE UP (ref 3.8–10.5)
WBC # FLD AUTO: 6.91 K/UL — SIGNIFICANT CHANGE UP (ref 3.8–10.5)
WBC # FLD AUTO: 7.04 K/UL — SIGNIFICANT CHANGE UP (ref 3.8–10.5)
WBC # FLD AUTO: 7.15 K/UL — SIGNIFICANT CHANGE UP (ref 3.8–10.5)
WBC # FLD AUTO: 7.15 K/UL — SIGNIFICANT CHANGE UP (ref 3.8–10.5)
WBC # FLD AUTO: 7.19 K/UL — SIGNIFICANT CHANGE UP (ref 3.8–10.5)
WBC # FLD AUTO: 7.19 K/UL — SIGNIFICANT CHANGE UP (ref 3.8–10.5)
WBC # FLD AUTO: 7.31 K/UL — SIGNIFICANT CHANGE UP (ref 3.8–10.5)
WBC # FLD AUTO: 7.43 K/UL — SIGNIFICANT CHANGE UP (ref 3.8–10.5)
WBC # FLD AUTO: 7.46 K/UL — SIGNIFICANT CHANGE UP (ref 3.8–10.5)
WBC # FLD AUTO: 7.46 K/UL — SIGNIFICANT CHANGE UP (ref 3.8–10.5)
WBC # FLD AUTO: 7.51 K/UL — SIGNIFICANT CHANGE UP (ref 3.8–10.5)
WBC # FLD AUTO: 7.62 K/UL — SIGNIFICANT CHANGE UP (ref 3.8–10.5)
WBC # FLD AUTO: 7.62 K/UL — SIGNIFICANT CHANGE UP (ref 3.8–10.5)
WBC # FLD AUTO: 7.65 K/UL — SIGNIFICANT CHANGE UP (ref 3.8–10.5)
WBC # FLD AUTO: 7.67 K/UL — SIGNIFICANT CHANGE UP (ref 3.8–10.5)
WBC # FLD AUTO: 7.67 K/UL — SIGNIFICANT CHANGE UP (ref 3.8–10.5)
WBC # FLD AUTO: 7.7 K/UL — SIGNIFICANT CHANGE UP (ref 3.8–10.5)
WBC # FLD AUTO: 7.7 K/UL — SIGNIFICANT CHANGE UP (ref 3.8–10.5)
WBC # FLD AUTO: 7.73 K/UL — SIGNIFICANT CHANGE UP (ref 3.8–10.5)
WBC # FLD AUTO: 7.73 K/UL — SIGNIFICANT CHANGE UP (ref 3.8–10.5)
WBC # FLD AUTO: 7.85 K/UL — SIGNIFICANT CHANGE UP (ref 3.8–10.5)
WBC # FLD AUTO: 7.88 K/UL — SIGNIFICANT CHANGE UP (ref 3.8–10.5)
WBC # FLD AUTO: 7.91 K/UL — SIGNIFICANT CHANGE UP (ref 3.8–10.5)
WBC # FLD AUTO: 8.01 K/UL — SIGNIFICANT CHANGE UP (ref 3.8–10.5)
WBC # FLD AUTO: 8.01 K/UL — SIGNIFICANT CHANGE UP (ref 3.8–10.5)
WBC # FLD AUTO: 8.19 K/UL — SIGNIFICANT CHANGE UP (ref 3.8–10.5)
WBC # FLD AUTO: 8.31 K/UL — SIGNIFICANT CHANGE UP (ref 3.8–10.5)
WBC # FLD AUTO: 8.33 K/UL — SIGNIFICANT CHANGE UP (ref 3.8–10.5)
WBC # FLD AUTO: 8.33 K/UL — SIGNIFICANT CHANGE UP (ref 3.8–10.5)
WBC # FLD AUTO: 8.41 K/UL — SIGNIFICANT CHANGE UP (ref 3.8–10.5)
WBC # FLD AUTO: 8.43 K/UL — SIGNIFICANT CHANGE UP (ref 3.8–10.5)
WBC # FLD AUTO: 8.59 K/UL — SIGNIFICANT CHANGE UP (ref 3.8–10.5)
WBC # FLD AUTO: 8.76 K/UL — SIGNIFICANT CHANGE UP (ref 3.8–10.5)
WBC # FLD AUTO: 8.81 K/UL — SIGNIFICANT CHANGE UP (ref 3.8–10.5)
WBC # FLD AUTO: 8.81 K/UL — SIGNIFICANT CHANGE UP (ref 3.8–10.5)
WBC # FLD AUTO: 8.82 K/UL — SIGNIFICANT CHANGE UP (ref 3.8–10.5)
WBC # FLD AUTO: 8.82 K/UL — SIGNIFICANT CHANGE UP (ref 3.8–10.5)
WBC # FLD AUTO: 8.83 K/UL — SIGNIFICANT CHANGE UP (ref 3.8–10.5)
WBC # FLD AUTO: 8.89 K/UL — SIGNIFICANT CHANGE UP (ref 3.8–10.5)
WBC # FLD AUTO: 8.89 K/UL — SIGNIFICANT CHANGE UP (ref 3.8–10.5)
WBC # FLD AUTO: 8.91 K/UL — SIGNIFICANT CHANGE UP (ref 3.8–10.5)
WBC # FLD AUTO: 8.91 K/UL — SIGNIFICANT CHANGE UP (ref 3.8–10.5)
WBC # FLD AUTO: 8.93 K/UL — SIGNIFICANT CHANGE UP (ref 3.8–10.5)
WBC # FLD AUTO: 8.96 K/UL — SIGNIFICANT CHANGE UP (ref 3.8–10.5)
WBC # FLD AUTO: 8.97 K/UL — SIGNIFICANT CHANGE UP (ref 3.8–10.5)
WBC # FLD AUTO: 8.97 K/UL — SIGNIFICANT CHANGE UP (ref 3.8–10.5)
WBC # FLD AUTO: 9.16 K/UL — SIGNIFICANT CHANGE UP (ref 3.8–10.5)
WBC # FLD AUTO: 9.25 K/UL — SIGNIFICANT CHANGE UP (ref 3.8–10.5)
WBC # FLD AUTO: 9.26 K/UL — SIGNIFICANT CHANGE UP (ref 3.8–10.5)
WBC # FLD AUTO: 9.32 K/UL — SIGNIFICANT CHANGE UP (ref 3.8–10.5)
WBC # FLD AUTO: 9.32 K/UL — SIGNIFICANT CHANGE UP (ref 3.8–10.5)
WBC # FLD AUTO: 9.39 K/UL — SIGNIFICANT CHANGE UP (ref 3.8–10.5)
WBC # FLD AUTO: 9.48 K/UL — SIGNIFICANT CHANGE UP (ref 3.8–10.5)
WBC # FLD AUTO: 9.59 K/UL — SIGNIFICANT CHANGE UP (ref 3.8–10.5)
WBC # FLD AUTO: 9.64 K/UL — SIGNIFICANT CHANGE UP (ref 3.8–10.5)
WBC # FLD AUTO: 9.64 K/UL — SIGNIFICANT CHANGE UP (ref 3.8–10.5)
WBC # FLD AUTO: 9.72 K/UL — SIGNIFICANT CHANGE UP (ref 3.8–10.5)
WBC # FLD AUTO: 9.79 K/UL — SIGNIFICANT CHANGE UP (ref 3.8–10.5)
WBC # FLD AUTO: 9.79 K/UL — SIGNIFICANT CHANGE UP (ref 3.8–10.5)
WBC # FLD AUTO: 9.8 K/UL — SIGNIFICANT CHANGE UP (ref 3.8–10.5)
WBC # FLD AUTO: 9.81 K/UL — SIGNIFICANT CHANGE UP (ref 3.8–10.5)
WBC # FLD AUTO: 9.89 K/UL — SIGNIFICANT CHANGE UP (ref 3.8–10.5)
WBC # FLD AUTO: 9.89 K/UL — SIGNIFICANT CHANGE UP (ref 3.8–10.5)
WBC # FLD AUTO: 9.91 K/UL — SIGNIFICANT CHANGE UP (ref 3.8–10.5)
WBC # FLD AUTO: 9.98 K/UL — SIGNIFICANT CHANGE UP (ref 3.8–10.5)
WBC UR QL: 0 /HPF — SIGNIFICANT CHANGE UP (ref 0–5)
WBC UR QL: 1 /HPF — SIGNIFICANT CHANGE UP (ref 0–5)
WBC UR QL: < 5 /HPF — SIGNIFICANT CHANGE UP
WBC UR QL: > 10 /HPF
WBC UR QL: ABNORMAL /HPF
WBC UR QL: ABNORMAL /HPF
ZINC SERPL-MCNC: 96 UG/DL — SIGNIFICANT CHANGE UP (ref 44–115)

## 2023-01-01 PROCEDURE — 99232 SBSQ HOSP IP/OBS MODERATE 35: CPT

## 2023-01-01 PROCEDURE — 99232 SBSQ HOSP IP/OBS MODERATE 35: CPT | Mod: GC

## 2023-01-01 PROCEDURE — 99233 SBSQ HOSP IP/OBS HIGH 50: CPT | Mod: GC

## 2023-01-01 PROCEDURE — 99231 SBSQ HOSP IP/OBS SF/LOW 25: CPT

## 2023-01-01 PROCEDURE — 71045 X-RAY EXAM CHEST 1 VIEW: CPT | Mod: 26

## 2023-01-01 PROCEDURE — 14301 TIS TRNFR ANY 30.1-60 SQ CM: CPT | Mod: GC,58

## 2023-01-01 PROCEDURE — 99233 SBSQ HOSP IP/OBS HIGH 50: CPT

## 2023-01-01 PROCEDURE — 99291 CRITICAL CARE FIRST HOUR: CPT

## 2023-01-01 PROCEDURE — 88307 TISSUE EXAM BY PATHOLOGIST: CPT | Mod: 26

## 2023-01-01 PROCEDURE — 99231 SBSQ HOSP IP/OBS SF/LOW 25: CPT | Mod: GC

## 2023-01-01 PROCEDURE — 47531 INJECTION FOR CHOLANGIOGRAM: CPT

## 2023-01-01 PROCEDURE — 76705 ECHO EXAM OF ABDOMEN: CPT | Mod: 26

## 2023-01-01 PROCEDURE — 44121 REMOVAL OF SMALL INTESTINE: CPT | Mod: GC,62,53

## 2023-01-01 PROCEDURE — 99222 1ST HOSP IP/OBS MODERATE 55: CPT

## 2023-01-01 PROCEDURE — 71045 X-RAY EXAM CHEST 1 VIEW: CPT | Mod: 26,76

## 2023-01-01 PROCEDURE — 99221 1ST HOSP IP/OBS SF/LOW 40: CPT

## 2023-01-01 PROCEDURE — 74018 RADEX ABDOMEN 1 VIEW: CPT | Mod: 26

## 2023-01-01 PROCEDURE — 36573 INSJ PICC RS&I 5 YR+: CPT

## 2023-01-01 PROCEDURE — 36569 INSJ PICC 5 YR+ W/O IMAGING: CPT

## 2023-01-01 PROCEDURE — 76705 ECHO EXAM OF ABDOMEN: CPT | Mod: 26,XU

## 2023-01-01 PROCEDURE — 93970 EXTREMITY STUDY: CPT | Mod: 26

## 2023-01-01 PROCEDURE — 76937 US GUIDE VASCULAR ACCESS: CPT | Mod: 26

## 2023-01-01 PROCEDURE — 49595 RPR AA HRN 1ST > 10 RDC: CPT | Mod: GC,58

## 2023-01-01 PROCEDURE — 36000 PLACE NEEDLE IN VEIN: CPT

## 2023-01-01 PROCEDURE — 74177 CT ABD & PELVIS W/CONTRAST: CPT | Mod: 26

## 2023-01-01 PROCEDURE — 74176 CT ABD & PELVIS W/O CONTRAST: CPT | Mod: 26

## 2023-01-01 PROCEDURE — 71260 CT THORAX DX C+: CPT | Mod: 26

## 2023-01-01 PROCEDURE — 49002 REOPENING OF ABDOMEN: CPT | Mod: 80,GC,58

## 2023-01-01 PROCEDURE — 76770 US EXAM ABDO BACK WALL COMP: CPT | Mod: 26

## 2023-01-01 PROCEDURE — 44120 REMOVAL OF SMALL INTESTINE: CPT | Mod: GC,62,53

## 2023-01-01 PROCEDURE — 49617 RPR AA HRN RCR > 10 RDC: CPT | Mod: GC,58

## 2023-01-01 PROCEDURE — 88305 TISSUE EXAM BY PATHOLOGIST: CPT | Mod: 26

## 2023-01-01 PROCEDURE — 71045 X-RAY EXAM CHEST 1 VIEW: CPT | Mod: 26,77

## 2023-01-01 PROCEDURE — 93975 VASCULAR STUDY: CPT | Mod: 26

## 2023-01-01 PROCEDURE — 70450 CT HEAD/BRAIN W/O DYE: CPT | Mod: 26

## 2023-01-01 PROCEDURE — 93010 ELECTROCARDIOGRAM REPORT: CPT | Mod: 59

## 2023-01-01 PROCEDURE — 93976 VASCULAR STUDY: CPT | Mod: 26

## 2023-01-01 PROCEDURE — 93306 TTE W/DOPPLER COMPLETE: CPT | Mod: 26

## 2023-01-01 PROCEDURE — 76775 US EXAM ABDO BACK WALL LIM: CPT | Mod: 26

## 2023-01-01 PROCEDURE — 71275 CT ANGIOGRAPHY CHEST: CPT | Mod: 26

## 2023-01-01 PROCEDURE — 93010 ELECTROCARDIOGRAM REPORT: CPT

## 2023-01-01 PROCEDURE — 74019 RADEX ABDOMEN 2 VIEWS: CPT | Mod: 26

## 2023-01-01 PROCEDURE — 99285 EMERGENCY DEPT VISIT HI MDM: CPT

## 2023-01-01 PROCEDURE — 76604 US EXAM CHEST: CPT | Mod: 26

## 2023-01-01 PROCEDURE — 71250 CT THORAX DX C-: CPT | Mod: 26

## 2023-01-01 PROCEDURE — 74183 MRI ABD W/O CNTR FLWD CNTR: CPT | Mod: 26

## 2023-01-01 PROCEDURE — 76937 US GUIDE VASCULAR ACCESS: CPT | Mod: 26,59

## 2023-01-01 PROCEDURE — 44310 ILEOSTOMY/JEJUNOSTOMY: CPT | Mod: 80,GC,58

## 2023-01-01 PROCEDURE — 95720 EEG PHY/QHP EA INCR W/VEEG: CPT

## 2023-01-01 PROCEDURE — 49083 ABD PARACENTESIS W/IMAGING: CPT

## 2023-01-01 PROCEDURE — 99223 1ST HOSP IP/OBS HIGH 75: CPT

## 2023-01-01 PROCEDURE — 99222 1ST HOSP IP/OBS MODERATE 55: CPT | Mod: GC

## 2023-01-01 PROCEDURE — 11005 DBRDMT SKIN ABDOMINAL WALL: CPT | Mod: GC,58

## 2023-01-01 PROCEDURE — 32557 INSERT CATH PLEURA W/ IMAGE: CPT | Mod: RT

## 2023-01-01 PROCEDURE — 47490 INCISION OF GALLBLADDER: CPT

## 2023-01-01 DEVICE — STAPLER ETHICON PROXIMATE RELOAD 75MM BLUE: Type: IMPLANTABLE DEVICE | Status: FUNCTIONAL

## 2023-01-01 DEVICE — MESH HERNIA VICRYL 12X12IN: Type: IMPLANTABLE DEVICE | Status: FUNCTIONAL

## 2023-01-01 DEVICE — SURGICEL 4 X 8": Type: IMPLANTABLE DEVICE | Status: FUNCTIONAL

## 2023-01-01 DEVICE — STAPLER COVIDIEN TRI-STAPLE 60MM PURPLE RELOAD: Type: IMPLANTABLE DEVICE | Status: FUNCTIONAL

## 2023-01-01 DEVICE — STAPLER ETHICON PROXIMATE 75MM WITH BLUE RELOAD: Type: IMPLANTABLE DEVICE | Status: FUNCTIONAL

## 2023-01-01 DEVICE — TISSUE MATRIX STRATTICE FIRM 20 X 20CM: Type: IMPLANTABLE DEVICE | Status: FUNCTIONAL

## 2023-01-01 DEVICE — CLIP APPLIER ETHICON LIGACLIP 13" LARGE: Type: IMPLANTABLE DEVICE | Status: FUNCTIONAL

## 2023-01-01 RX ORDER — ACETAMINOPHEN 500 MG
1000 TABLET ORAL ONCE
Refills: 0 | Status: COMPLETED | OUTPATIENT
Start: 2023-01-01 | End: 2023-01-01

## 2023-01-01 RX ORDER — SODIUM CHLORIDE 9 MG/ML
1000 INJECTION, SOLUTION INTRAVENOUS
Refills: 0 | Status: DISCONTINUED | OUTPATIENT
Start: 2023-01-01 | End: 2023-01-01

## 2023-01-01 RX ORDER — CEFTRIAXONE 500 MG/1
2000 INJECTION, POWDER, FOR SOLUTION INTRAMUSCULAR; INTRAVENOUS ONCE
Refills: 0 | Status: COMPLETED | OUTPATIENT
Start: 2023-01-01 | End: 2023-01-01

## 2023-01-01 RX ORDER — SODIUM CHLORIDE 9 MG/ML
500 INJECTION INTRAMUSCULAR; INTRAVENOUS; SUBCUTANEOUS ONCE
Refills: 0 | Status: COMPLETED | OUTPATIENT
Start: 2023-01-01 | End: 2023-01-01

## 2023-01-01 RX ORDER — FUROSEMIDE 40 MG
20 TABLET ORAL ONCE
Refills: 0 | Status: COMPLETED | OUTPATIENT
Start: 2023-01-01 | End: 2023-01-01

## 2023-01-01 RX ORDER — CEFTRIAXONE 500 MG/1
1000 INJECTION, POWDER, FOR SOLUTION INTRAMUSCULAR; INTRAVENOUS EVERY 24 HOURS
Refills: 0 | Status: DISCONTINUED | OUTPATIENT
Start: 2023-01-01 | End: 2023-01-01

## 2023-01-01 RX ORDER — MEROPENEM 1 G/30ML
1000 INJECTION INTRAVENOUS EVERY 8 HOURS
Refills: 0 | Status: DISCONTINUED | OUTPATIENT
Start: 2023-01-01 | End: 2023-01-01

## 2023-01-01 RX ORDER — VENLAFAXINE HCL 75 MG
150 CAPSULE, EXT RELEASE 24 HR ORAL EVERY 12 HOURS
Refills: 0 | Status: DISCONTINUED | OUTPATIENT
Start: 2023-01-01 | End: 2023-01-01

## 2023-01-01 RX ORDER — ALBUMIN HUMAN 25 %
250 VIAL (ML) INTRAVENOUS ONCE
Refills: 0 | Status: COMPLETED | OUTPATIENT
Start: 2023-01-01 | End: 2023-01-01

## 2023-01-01 RX ORDER — ELECTROLYTE SOLUTION,INJ
1 VIAL (ML) INTRAVENOUS
Refills: 0 | Status: DISCONTINUED | OUTPATIENT
Start: 2023-01-01 | End: 2023-01-01

## 2023-01-01 RX ORDER — METOPROLOL TARTRATE 50 MG
5 TABLET ORAL
Refills: 0 | Status: COMPLETED | OUTPATIENT
Start: 2023-01-01 | End: 2023-01-01

## 2023-01-01 RX ORDER — ENOXAPARIN SODIUM 100 MG/ML
100 INJECTION SUBCUTANEOUS EVERY 12 HOURS
Refills: 0 | Status: DISCONTINUED | OUTPATIENT
Start: 2023-01-01 | End: 2023-01-01

## 2023-01-01 RX ORDER — PANTOPRAZOLE SODIUM 20 MG/1
40 TABLET, DELAYED RELEASE ORAL DAILY
Refills: 0 | Status: DISCONTINUED | OUTPATIENT
Start: 2023-01-01 | End: 2023-01-01

## 2023-01-01 RX ORDER — FUROSEMIDE 40 MG
40 TABLET ORAL ONCE
Refills: 0 | Status: COMPLETED | OUTPATIENT
Start: 2023-01-01 | End: 2023-01-01

## 2023-01-01 RX ORDER — OCTREOTIDE ACETATE 200 UG/ML
100 INJECTION, SOLUTION INTRAVENOUS; SUBCUTANEOUS EVERY 8 HOURS
Refills: 0 | Status: DISCONTINUED | OUTPATIENT
Start: 2023-01-01 | End: 2023-01-01

## 2023-01-01 RX ORDER — DEXTROSE 50 % IN WATER 50 %
25 SYRINGE (ML) INTRAVENOUS ONCE
Refills: 0 | Status: COMPLETED | OUTPATIENT
Start: 2023-01-01 | End: 2023-01-01

## 2023-01-01 RX ORDER — CALCIUM GLUCONATE 100 MG/ML
1 VIAL (ML) INTRAVENOUS ONCE
Refills: 0 | Status: COMPLETED | OUTPATIENT
Start: 2023-01-01 | End: 2023-01-01

## 2023-01-01 RX ORDER — MORPHINE 10 MG/ML
6 SOLUTION ORAL EVERY 6 HOURS
Refills: 0 | Status: DISCONTINUED | OUTPATIENT
Start: 2023-01-01 | End: 2023-01-01

## 2023-01-01 RX ORDER — I.V. FAT EMULSION 20 G/100ML
1 EMULSION INTRAVENOUS
Qty: 100 | Refills: 0 | Status: DISCONTINUED | OUTPATIENT
Start: 2023-01-01 | End: 2023-01-01

## 2023-01-01 RX ORDER — NOREPINEPHRINE BITARTRATE/D5W 8 MG/250ML
0.06 PLASTIC BAG, INJECTION (ML) INTRAVENOUS
Qty: 8 | Refills: 0 | Status: DISCONTINUED | OUTPATIENT
Start: 2023-01-01 | End: 2023-01-01

## 2023-01-01 RX ORDER — MAGNESIUM SULFATE 500 MG/ML
1 VIAL (ML) INJECTION ONCE
Refills: 0 | Status: COMPLETED | OUTPATIENT
Start: 2023-01-01 | End: 2023-01-01

## 2023-01-01 RX ORDER — OXYCODONE HYDROCHLORIDE 5 MG/1
2.5 TABLET ORAL EVERY 6 HOURS
Refills: 0 | Status: DISCONTINUED | OUTPATIENT
Start: 2023-01-01 | End: 2023-01-01

## 2023-01-01 RX ORDER — METRONIDAZOLE 500 MG
500 TABLET ORAL ONCE
Refills: 0 | Status: COMPLETED | OUTPATIENT
Start: 2023-01-01 | End: 2023-01-01

## 2023-01-01 RX ORDER — SODIUM CHLORIDE 9 MG/ML
1000 INJECTION INTRAMUSCULAR; INTRAVENOUS; SUBCUTANEOUS
Refills: 0 | Status: DISCONTINUED | OUTPATIENT
Start: 2023-01-01 | End: 2023-01-01

## 2023-01-01 RX ORDER — I.V. FAT EMULSION 20 G/100ML
0.5 EMULSION INTRAVENOUS
Qty: 50 | Refills: 0 | Status: DISCONTINUED | OUTPATIENT
Start: 2023-01-01 | End: 2023-01-01

## 2023-01-01 RX ORDER — SODIUM CHLORIDE 9 MG/ML
500 INJECTION, SOLUTION INTRAVENOUS ONCE
Refills: 0 | Status: DISCONTINUED | OUTPATIENT
Start: 2023-01-01 | End: 2023-01-01

## 2023-01-01 RX ORDER — DEXTROSE 50 % IN WATER 50 %
25 SYRINGE (ML) INTRAVENOUS ONCE
Refills: 0 | Status: DISCONTINUED | OUTPATIENT
Start: 2023-01-01 | End: 2023-01-01

## 2023-01-01 RX ORDER — HYDROMORPHONE HYDROCHLORIDE 2 MG/ML
0.2 INJECTION INTRAMUSCULAR; INTRAVENOUS; SUBCUTANEOUS ONCE
Refills: 0 | Status: DISCONTINUED | OUTPATIENT
Start: 2023-01-01 | End: 2023-01-01

## 2023-01-01 RX ORDER — QUETIAPINE FUMARATE 200 MG/1
25 TABLET, FILM COATED ORAL AT BEDTIME
Refills: 0 | Status: DISCONTINUED | OUTPATIENT
Start: 2023-01-01 | End: 2023-01-01

## 2023-01-01 RX ORDER — ERGOCALCIFEROL 1.25 MG/1
50000 CAPSULE ORAL
Refills: 0 | Status: COMPLETED | OUTPATIENT
Start: 2023-01-01 | End: 2024-01-01

## 2023-01-01 RX ORDER — DAPTOMYCIN 500 MG/10ML
800 INJECTION, POWDER, LYOPHILIZED, FOR SOLUTION INTRAVENOUS EVERY 24 HOURS
Refills: 0 | Status: DISCONTINUED | OUTPATIENT
Start: 2023-01-01 | End: 2023-01-01

## 2023-01-01 RX ORDER — CISATRACURIUM BESYLATE 2 MG/ML
3 INJECTION INTRAVENOUS
Qty: 200 | Refills: 0 | Status: DISCONTINUED | OUTPATIENT
Start: 2023-01-01 | End: 2023-01-01

## 2023-01-01 RX ORDER — FLUTICASONE PROPIONATE 50 MCG
1 SPRAY, SUSPENSION NASAL
Refills: 0 | Status: DISCONTINUED | OUTPATIENT
Start: 2023-01-01 | End: 2023-01-01

## 2023-01-01 RX ORDER — LOPERAMIDE HCL 2 MG
4 TABLET ORAL EVERY 6 HOURS
Refills: 0 | Status: DISCONTINUED | OUTPATIENT
Start: 2023-01-01 | End: 2023-01-01

## 2023-01-01 RX ORDER — AMIODARONE HYDROCHLORIDE 400 MG/1
0.5 TABLET ORAL
Qty: 450 | Refills: 0 | Status: DISCONTINUED | OUTPATIENT
Start: 2023-01-01 | End: 2023-01-01

## 2023-01-01 RX ORDER — SODIUM CHLORIDE 5 G/100ML
100 INJECTION, SOLUTION INTRAVENOUS ONCE
Refills: 0 | Status: COMPLETED | OUTPATIENT
Start: 2023-01-01 | End: 2023-01-01

## 2023-01-01 RX ORDER — POTASSIUM CHLORIDE 20 MEQ
10 PACKET (EA) ORAL ONCE
Refills: 0 | Status: COMPLETED | OUTPATIENT
Start: 2023-01-01 | End: 2023-01-01

## 2023-01-01 RX ORDER — METRONIDAZOLE 500 MG
TABLET ORAL
Refills: 0 | Status: DISCONTINUED | OUTPATIENT
Start: 2023-01-01 | End: 2023-01-01

## 2023-01-01 RX ORDER — I.V. FAT EMULSION 20 G/100ML
0.49 EMULSION INTRAVENOUS
Qty: 50 | Refills: 0 | Status: DISCONTINUED | OUTPATIENT
Start: 2023-01-01 | End: 2023-01-01

## 2023-01-01 RX ORDER — METOCLOPRAMIDE HCL 10 MG
10 TABLET ORAL EVERY 8 HOURS
Refills: 0 | Status: DISCONTINUED | OUTPATIENT
Start: 2023-01-01 | End: 2023-01-01

## 2023-01-01 RX ORDER — POTASSIUM CHLORIDE 20 MEQ
10 PACKET (EA) ORAL
Refills: 0 | Status: COMPLETED | OUTPATIENT
Start: 2023-01-01 | End: 2023-01-01

## 2023-01-01 RX ORDER — ONDANSETRON 8 MG/1
4 TABLET, FILM COATED ORAL EVERY 6 HOURS
Refills: 0 | Status: DISCONTINUED | OUTPATIENT
Start: 2023-01-01 | End: 2024-01-01

## 2023-01-01 RX ORDER — DIPHENOXYLATE HCL/ATROPINE 2.5-.025MG
2 TABLET ORAL EVERY 6 HOURS
Refills: 0 | Status: DISCONTINUED | OUTPATIENT
Start: 2023-01-01 | End: 2023-01-01

## 2023-01-01 RX ORDER — SODIUM CHLORIDE 9 MG/ML
500 INJECTION, SOLUTION INTRAVENOUS ONCE
Refills: 0 | Status: COMPLETED | OUTPATIENT
Start: 2023-01-01 | End: 2023-01-01

## 2023-01-01 RX ORDER — DAPTOMYCIN 500 MG/10ML
400 INJECTION, POWDER, LYOPHILIZED, FOR SOLUTION INTRAVENOUS EVERY 24 HOURS
Refills: 0 | Status: DISCONTINUED | OUTPATIENT
Start: 2023-01-01 | End: 2023-01-01

## 2023-01-01 RX ORDER — CHLORHEXIDINE GLUCONATE 213 G/1000ML
1 SOLUTION TOPICAL
Refills: 0 | Status: DISCONTINUED | OUTPATIENT
Start: 2023-01-01 | End: 2023-01-01

## 2023-01-01 RX ORDER — METOPROLOL TARTRATE 50 MG
10 TABLET ORAL EVERY 6 HOURS
Refills: 0 | Status: DISCONTINUED | OUTPATIENT
Start: 2023-01-01 | End: 2023-01-01

## 2023-01-01 RX ORDER — VENLAFAXINE HCL 75 MG
150 CAPSULE, EXT RELEASE 24 HR ORAL
Refills: 0 | Status: DISCONTINUED | OUTPATIENT
Start: 2023-01-01 | End: 2023-01-01

## 2023-01-01 RX ORDER — SODIUM CHLORIDE 9 MG/ML
1000 INJECTION, SOLUTION INTRAVENOUS ONCE
Refills: 0 | Status: COMPLETED | OUTPATIENT
Start: 2023-01-01 | End: 2023-01-01

## 2023-01-01 RX ORDER — ONDANSETRON 8 MG/1
4 TABLET, FILM COATED ORAL ONCE
Refills: 0 | Status: COMPLETED | OUTPATIENT
Start: 2023-01-01 | End: 2023-01-01

## 2023-01-01 RX ORDER — HYDRALAZINE HCL 50 MG
10 TABLET ORAL ONCE
Refills: 0 | Status: COMPLETED | OUTPATIENT
Start: 2023-01-01 | End: 2023-01-01

## 2023-01-01 RX ORDER — ONDANSETRON 8 MG/1
4 TABLET, FILM COATED ORAL EVERY 8 HOURS
Refills: 0 | Status: DISCONTINUED | OUTPATIENT
Start: 2023-01-01 | End: 2023-01-01

## 2023-01-01 RX ORDER — LOPERAMIDE HCL 2 MG
4 TABLET ORAL EVERY 12 HOURS
Refills: 0 | Status: DISCONTINUED | OUTPATIENT
Start: 2023-01-01 | End: 2023-01-01

## 2023-01-01 RX ORDER — HYDRALAZINE HCL 50 MG
10 TABLET ORAL EVERY 6 HOURS
Refills: 0 | Status: DISCONTINUED | OUTPATIENT
Start: 2023-01-01 | End: 2023-01-01

## 2023-01-01 RX ORDER — HYDROMORPHONE HYDROCHLORIDE 2 MG/ML
0.5 INJECTION INTRAMUSCULAR; INTRAVENOUS; SUBCUTANEOUS ONCE
Refills: 0 | Status: DISCONTINUED | OUTPATIENT
Start: 2023-01-01 | End: 2023-01-01

## 2023-01-01 RX ORDER — DEXTROSE 50 % IN WATER 50 %
12.5 SYRINGE (ML) INTRAVENOUS ONCE
Refills: 0 | Status: DISCONTINUED | OUTPATIENT
Start: 2023-01-01 | End: 2023-01-01

## 2023-01-01 RX ORDER — I.V. FAT EMULSION 20 G/100ML
0.54 EMULSION INTRAVENOUS
Qty: 50 | Refills: 0 | Status: DISCONTINUED | OUTPATIENT
Start: 2023-01-01 | End: 2023-01-01

## 2023-01-01 RX ORDER — INSULIN LISPRO 100/ML
VIAL (ML) SUBCUTANEOUS
Refills: 0 | Status: DISCONTINUED | OUTPATIENT
Start: 2023-01-01 | End: 2023-01-01

## 2023-01-01 RX ORDER — DIATRIZOATE MEGLUMINE 180 MG/ML
30 INJECTION, SOLUTION INTRAVESICAL ONCE
Refills: 0 | Status: COMPLETED | OUTPATIENT
Start: 2023-01-01 | End: 2023-01-01

## 2023-01-01 RX ORDER — SCOPALAMINE 1 MG/3D
1 PATCH, EXTENDED RELEASE TRANSDERMAL
Refills: 0 | Status: DISCONTINUED | OUTPATIENT
Start: 2023-01-01 | End: 2023-01-01

## 2023-01-01 RX ORDER — I.V. FAT EMULSION 20 G/100ML
0.99 EMULSION INTRAVENOUS
Qty: 100 | Refills: 0 | Status: DISCONTINUED | OUTPATIENT
Start: 2023-01-01 | End: 2023-01-01

## 2023-01-01 RX ORDER — POTASSIUM CHLORIDE 20 MEQ
20 PACKET (EA) ORAL
Refills: 0 | Status: COMPLETED | OUTPATIENT
Start: 2023-01-01 | End: 2023-01-01

## 2023-01-01 RX ORDER — I.V. FAT EMULSION 20 G/100ML
1.2 EMULSION INTRAVENOUS
Qty: 100 | Refills: 0 | Status: DISCONTINUED | OUTPATIENT
Start: 2023-01-01 | End: 2023-01-01

## 2023-01-01 RX ORDER — POTASSIUM CHLORIDE 20 MEQ
40 PACKET (EA) ORAL ONCE
Refills: 0 | Status: COMPLETED | OUTPATIENT
Start: 2023-01-01 | End: 2023-01-01

## 2023-01-01 RX ORDER — HEPARIN SODIUM 5000 [USP'U]/ML
5000 INJECTION INTRAVENOUS; SUBCUTANEOUS EVERY 8 HOURS
Refills: 0 | Status: DISCONTINUED | OUTPATIENT
Start: 2023-01-01 | End: 2023-01-01

## 2023-01-01 RX ORDER — VENLAFAXINE HCL 75 MG
150 CAPSULE, EXT RELEASE 24 HR ORAL DAILY
Refills: 0 | Status: DISCONTINUED | OUTPATIENT
Start: 2023-01-01 | End: 2023-01-01

## 2023-01-01 RX ORDER — ERYTHROMYCIN ETHYLSUCCINATE 400 MG
125 TABLET ORAL EVERY 6 HOURS
Refills: 0 | Status: DISCONTINUED | OUTPATIENT
Start: 2023-01-01 | End: 2023-01-01

## 2023-01-01 RX ORDER — DAPTOMYCIN 500 MG/10ML
800 INJECTION, POWDER, LYOPHILIZED, FOR SOLUTION INTRAVENOUS ONCE
Refills: 0 | Status: DISCONTINUED | OUTPATIENT
Start: 2023-01-01 | End: 2023-01-01

## 2023-01-01 RX ORDER — HYDRALAZINE HCL 50 MG
20 TABLET ORAL EVERY 8 HOURS
Refills: 0 | Status: DISCONTINUED | OUTPATIENT
Start: 2023-01-01 | End: 2023-01-01

## 2023-01-01 RX ORDER — LABETALOL HCL 100 MG
200 TABLET ORAL EVERY 12 HOURS
Refills: 0 | Status: DISCONTINUED | OUTPATIENT
Start: 2023-01-01 | End: 2023-01-01

## 2023-01-01 RX ORDER — NOREPINEPHRINE BITARTRATE/D5W 8 MG/250ML
0.05 PLASTIC BAG, INJECTION (ML) INTRAVENOUS
Qty: 8 | Refills: 0 | Status: DISCONTINUED | OUTPATIENT
Start: 2023-01-01 | End: 2023-01-01

## 2023-01-01 RX ORDER — SODIUM CHLORIDE 9 MG/ML
1000 INJECTION INTRAMUSCULAR; INTRAVENOUS; SUBCUTANEOUS ONCE
Refills: 0 | Status: COMPLETED | OUTPATIENT
Start: 2023-01-01 | End: 2023-01-01

## 2023-01-01 RX ORDER — POTASSIUM PHOSPHATE, MONOBASIC POTASSIUM PHOSPHATE, DIBASIC 236; 224 MG/ML; MG/ML
15 INJECTION, SOLUTION INTRAVENOUS ONCE
Refills: 0 | Status: COMPLETED | OUTPATIENT
Start: 2023-01-01 | End: 2023-01-01

## 2023-01-01 RX ORDER — IOHEXOL 300 MG/ML
30 INJECTION, SOLUTION INTRAVENOUS ONCE
Refills: 0 | Status: COMPLETED | OUTPATIENT
Start: 2023-01-01 | End: 2023-01-01

## 2023-01-01 RX ORDER — ONDANSETRON 8 MG/1
4 TABLET, FILM COATED ORAL EVERY 6 HOURS
Refills: 0 | Status: DISCONTINUED | OUTPATIENT
Start: 2023-01-01 | End: 2023-01-01

## 2023-01-01 RX ORDER — METOPROLOL TARTRATE 50 MG
5 TABLET ORAL EVERY 6 HOURS
Refills: 0 | Status: DISCONTINUED | OUTPATIENT
Start: 2023-01-01 | End: 2023-01-01

## 2023-01-01 RX ORDER — LABETALOL HCL 100 MG
10 TABLET ORAL ONCE
Refills: 0 | Status: COMPLETED | OUTPATIENT
Start: 2023-01-01 | End: 2023-01-01

## 2023-01-01 RX ORDER — DEXTROSE 50 % IN WATER 50 %
50 SYRINGE (ML) INTRAVENOUS ONCE
Refills: 0 | Status: COMPLETED | OUTPATIENT
Start: 2023-01-01 | End: 2023-01-01

## 2023-01-01 RX ORDER — METRONIDAZOLE 500 MG
500 TABLET ORAL EVERY 8 HOURS
Refills: 0 | Status: DISCONTINUED | OUTPATIENT
Start: 2023-01-01 | End: 2023-01-01

## 2023-01-01 RX ORDER — FAMOTIDINE 10 MG/ML
20 INJECTION INTRAVENOUS DAILY
Refills: 0 | Status: DISCONTINUED | OUTPATIENT
Start: 2023-01-01 | End: 2023-01-01

## 2023-01-01 RX ORDER — ACETAMINOPHEN 500 MG
650 TABLET ORAL EVERY 6 HOURS
Refills: 0 | Status: DISCONTINUED | OUTPATIENT
Start: 2023-01-01 | End: 2023-01-01

## 2023-01-01 RX ORDER — KETOROLAC TROMETHAMINE 30 MG/ML
15 SYRINGE (ML) INJECTION ONCE
Refills: 0 | Status: DISCONTINUED | OUTPATIENT
Start: 2023-01-01 | End: 2023-01-01

## 2023-01-01 RX ORDER — FLUCONAZOLE 150 MG/1
400 TABLET ORAL EVERY 24 HOURS
Refills: 0 | Status: COMPLETED | OUTPATIENT
Start: 2023-01-01 | End: 2023-01-01

## 2023-01-01 RX ORDER — VENLAFAXINE HCL 75 MG
300 CAPSULE, EXT RELEASE 24 HR ORAL DAILY
Refills: 0 | Status: DISCONTINUED | OUTPATIENT
Start: 2023-01-01 | End: 2023-01-01

## 2023-01-01 RX ORDER — GLUCAGON INJECTION, SOLUTION 0.5 MG/.1ML
1 INJECTION, SOLUTION SUBCUTANEOUS ONCE
Refills: 0 | Status: DISCONTINUED | OUTPATIENT
Start: 2023-01-01 | End: 2023-01-01

## 2023-01-01 RX ORDER — SODIUM CHLORIDE 9 MG/ML
1500 INJECTION, SOLUTION INTRAVENOUS ONCE
Refills: 0 | Status: COMPLETED | OUTPATIENT
Start: 2023-01-01 | End: 2023-01-01

## 2023-01-01 RX ORDER — SODIUM CHLORIDE 9 MG/ML
1500 INJECTION, SOLUTION INTRAVENOUS
Refills: 0 | Status: DISCONTINUED | OUTPATIENT
Start: 2023-01-01 | End: 2023-01-01

## 2023-01-01 RX ORDER — POTASSIUM CHLORIDE 20 MEQ
20 PACKET (EA) ORAL ONCE
Refills: 0 | Status: COMPLETED | OUTPATIENT
Start: 2023-01-01 | End: 2023-01-01

## 2023-01-01 RX ORDER — DAPTOMYCIN 500 MG/10ML
800 INJECTION, POWDER, LYOPHILIZED, FOR SOLUTION INTRAVENOUS ONCE
Refills: 0 | Status: COMPLETED | OUTPATIENT
Start: 2023-01-01 | End: 2023-01-01

## 2023-01-01 RX ORDER — AMLODIPINE BESYLATE 2.5 MG/1
5 TABLET ORAL EVERY 24 HOURS
Refills: 0 | Status: DISCONTINUED | OUTPATIENT
Start: 2023-01-01 | End: 2023-01-01

## 2023-01-01 RX ORDER — DEXTROSE 10 % IN WATER 10 %
1000 INTRAVENOUS SOLUTION INTRAVENOUS
Refills: 0 | Status: DISCONTINUED | OUTPATIENT
Start: 2023-01-01 | End: 2023-01-01

## 2023-01-01 RX ORDER — SODIUM CHLORIDE 9 MG/ML
10 INJECTION INTRAMUSCULAR; INTRAVENOUS; SUBCUTANEOUS
Refills: 0 | Status: DISCONTINUED | OUTPATIENT
Start: 2023-01-01 | End: 2023-01-01

## 2023-01-01 RX ORDER — I.V. FAT EMULSION 20 G/100ML
1.15 EMULSION INTRAVENOUS
Qty: 100 | Refills: 0 | Status: DISCONTINUED | OUTPATIENT
Start: 2023-01-01 | End: 2023-01-01

## 2023-01-01 RX ORDER — LIDOCAINE HCL 20 MG/ML
20 VIAL (ML) INJECTION ONCE
Refills: 0 | Status: COMPLETED | OUTPATIENT
Start: 2023-01-01 | End: 2023-01-01

## 2023-01-01 RX ORDER — POTASSIUM PHOSPHATE, MONOBASIC POTASSIUM PHOSPHATE, DIBASIC 236; 224 MG/ML; MG/ML
15 INJECTION, SOLUTION INTRAVENOUS ONCE
Refills: 0 | Status: DISCONTINUED | OUTPATIENT
Start: 2023-01-01 | End: 2023-01-01

## 2023-01-01 RX ORDER — AMLODIPINE BESYLATE 2.5 MG/1
10 TABLET ORAL EVERY 24 HOURS
Refills: 0 | Status: DISCONTINUED | OUTPATIENT
Start: 2023-01-01 | End: 2023-01-01

## 2023-01-01 RX ORDER — SODIUM CHLORIDE 9 MG/ML
4 INJECTION INTRAMUSCULAR; INTRAVENOUS; SUBCUTANEOUS ONCE
Refills: 0 | Status: COMPLETED | OUTPATIENT
Start: 2023-01-01 | End: 2023-01-01

## 2023-01-01 RX ORDER — HEPARIN SODIUM 5000 [USP'U]/ML
5000 INJECTION INTRAVENOUS; SUBCUTANEOUS ONCE
Refills: 0 | Status: COMPLETED | OUTPATIENT
Start: 2023-01-01 | End: 2023-01-01

## 2023-01-01 RX ORDER — SODIUM CHLORIDE 9 MG/ML
1000 INJECTION INTRAMUSCULAR; INTRAVENOUS; SUBCUTANEOUS ONCE
Refills: 0 | Status: DISCONTINUED | OUTPATIENT
Start: 2023-01-01 | End: 2023-01-01

## 2023-01-01 RX ORDER — DIPHENHYDRAMINE HCL 50 MG
25 CAPSULE ORAL ONCE
Refills: 0 | Status: COMPLETED | OUTPATIENT
Start: 2023-01-01 | End: 2023-01-01

## 2023-01-01 RX ORDER — METOPROLOL TARTRATE 50 MG
25 TABLET ORAL EVERY 12 HOURS
Refills: 0 | Status: DISCONTINUED | OUTPATIENT
Start: 2023-01-01 | End: 2023-01-01

## 2023-01-01 RX ORDER — DEXTROSE 50 % IN WATER 50 %
15 SYRINGE (ML) INTRAVENOUS ONCE
Refills: 0 | Status: DISCONTINUED | OUTPATIENT
Start: 2023-01-01 | End: 2023-01-01

## 2023-01-01 RX ORDER — BENZOCAINE AND MENTHOL 5; 1 G/100ML; G/100ML
1 LIQUID ORAL ONCE
Refills: 0 | Status: COMPLETED | OUTPATIENT
Start: 2023-01-01 | End: 2023-01-01

## 2023-01-01 RX ORDER — SODIUM CHLORIDE 9 MG/ML
500 INJECTION INTRAMUSCULAR; INTRAVENOUS; SUBCUTANEOUS ONCE
Refills: 0 | Status: DISCONTINUED | OUTPATIENT
Start: 2023-01-01 | End: 2023-01-01

## 2023-01-01 RX ORDER — FOLIC ACID 0.8 MG
1 TABLET ORAL DAILY
Refills: 0 | Status: DISCONTINUED | OUTPATIENT
Start: 2023-01-01 | End: 2023-01-01

## 2023-01-01 RX ORDER — PANTOPRAZOLE SODIUM 20 MG/1
40 TABLET, DELAYED RELEASE ORAL EVERY 12 HOURS
Refills: 0 | Status: DISCONTINUED | OUTPATIENT
Start: 2023-01-01 | End: 2023-01-01

## 2023-01-01 RX ORDER — HYDROMORPHONE HYDROCHLORIDE 2 MG/ML
0.2 INJECTION INTRAMUSCULAR; INTRAVENOUS; SUBCUTANEOUS EVERY 4 HOURS
Refills: 0 | Status: DISCONTINUED | OUTPATIENT
Start: 2023-01-01 | End: 2023-01-01

## 2023-01-01 RX ORDER — ALTEPLASE 100 MG
2 KIT INTRAVENOUS ONCE
Refills: 0 | Status: COMPLETED | OUTPATIENT
Start: 2023-01-01 | End: 2023-01-01

## 2023-01-01 RX ORDER — NICARDIPINE HYDROCHLORIDE 30 MG/1
5 CAPSULE, EXTENDED RELEASE ORAL
Qty: 40 | Refills: 0 | Status: DISCONTINUED | OUTPATIENT
Start: 2023-01-01 | End: 2023-01-01

## 2023-01-01 RX ORDER — LEVOTHYROXINE SODIUM 125 MCG
50 TABLET ORAL DAILY
Refills: 0 | Status: DISCONTINUED | OUTPATIENT
Start: 2023-01-01 | End: 2023-01-01

## 2023-01-01 RX ORDER — AMIODARONE HYDROCHLORIDE 400 MG/1
200 TABLET ORAL DAILY
Refills: 0 | Status: DISCONTINUED | OUTPATIENT
Start: 2023-01-01 | End: 2023-01-01

## 2023-01-01 RX ORDER — LOPERAMIDE HCL 2 MG
4 TABLET ORAL EVERY 8 HOURS
Refills: 0 | Status: DISCONTINUED | OUTPATIENT
Start: 2023-01-01 | End: 2023-01-01

## 2023-01-01 RX ORDER — METOPROLOL TARTRATE 50 MG
5 TABLET ORAL ONCE
Refills: 0 | Status: DISCONTINUED | OUTPATIENT
Start: 2023-01-01 | End: 2023-01-01

## 2023-01-01 RX ORDER — CEFEPIME 1 G/1
2000 INJECTION, POWDER, FOR SOLUTION INTRAMUSCULAR; INTRAVENOUS EVERY 8 HOURS
Refills: 0 | Status: DISCONTINUED | OUTPATIENT
Start: 2023-01-01 | End: 2023-01-01

## 2023-01-01 RX ORDER — LIDOCAINE 4 G/100G
1 CREAM TOPICAL ONCE
Refills: 0 | Status: COMPLETED | OUTPATIENT
Start: 2023-01-01 | End: 2023-01-01

## 2023-01-01 RX ORDER — DEXMEDETOMIDINE HYDROCHLORIDE IN 0.9% SODIUM CHLORIDE 4 UG/ML
0.2 INJECTION INTRAVENOUS
Qty: 400 | Refills: 0 | Status: DISCONTINUED | OUTPATIENT
Start: 2023-01-01 | End: 2023-01-01

## 2023-01-01 RX ORDER — DIPHENHYDRAMINE HYDROCHLORIDE AND LIDOCAINE HYDROCHLORIDE AND ALUMINUM HYDROXIDE AND MAGNESIUM HYDRO
30 KIT ONCE
Refills: 0 | Status: COMPLETED | OUTPATIENT
Start: 2023-01-01 | End: 2023-01-01

## 2023-01-01 RX ORDER — NYSTATIN CREAM 100000 [USP'U]/G
1 CREAM TOPICAL THREE TIMES A DAY
Refills: 0 | Status: DISCONTINUED | OUTPATIENT
Start: 2023-01-01 | End: 2023-01-01

## 2023-01-01 RX ORDER — SODIUM CHLORIDE 9 MG/ML
500 INJECTION, SOLUTION INTRAVENOUS
Refills: 0 | Status: DISCONTINUED | OUTPATIENT
Start: 2023-01-01 | End: 2023-01-01

## 2023-01-01 RX ORDER — OXYCODONE HYDROCHLORIDE 5 MG/1
5 TABLET ORAL EVERY 4 HOURS
Refills: 0 | Status: DISCONTINUED | OUTPATIENT
Start: 2023-01-01 | End: 2023-01-01

## 2023-01-01 RX ORDER — IPRATROPIUM/ALBUTEROL SULFATE 18-103MCG
3 AEROSOL WITH ADAPTER (GRAM) INHALATION EVERY 6 HOURS
Refills: 0 | Status: DISCONTINUED | OUTPATIENT
Start: 2023-01-01 | End: 2023-01-01

## 2023-01-01 RX ORDER — ACETAMINOPHEN 500 MG
975 TABLET ORAL EVERY 8 HOURS
Refills: 0 | Status: DISCONTINUED | OUTPATIENT
Start: 2023-01-01 | End: 2023-01-01

## 2023-01-01 RX ORDER — OCTREOTIDE ACETATE 200 UG/ML
100 INJECTION, SOLUTION INTRAVENOUS; SUBCUTANEOUS THREE TIMES A DAY
Refills: 0 | Status: DISCONTINUED | OUTPATIENT
Start: 2023-01-01 | End: 2023-01-01

## 2023-01-01 RX ORDER — IOHEXOL 300 MG/ML
30 INJECTION, SOLUTION INTRAVENOUS ONCE
Refills: 0 | Status: DISCONTINUED | OUTPATIENT
Start: 2023-01-01 | End: 2023-01-01

## 2023-01-01 RX ORDER — HYDRALAZINE HCL 50 MG
20 TABLET ORAL EVERY 6 HOURS
Refills: 0 | Status: DISCONTINUED | OUTPATIENT
Start: 2023-01-01 | End: 2023-01-01

## 2023-01-01 RX ORDER — ATORVASTATIN CALCIUM 80 MG/1
20 TABLET, FILM COATED ORAL AT BEDTIME
Refills: 0 | Status: DISCONTINUED | OUTPATIENT
Start: 2023-01-01 | End: 2023-01-01

## 2023-01-01 RX ORDER — CHLORHEXIDINE GLUCONATE 213 G/1000ML
15 SOLUTION TOPICAL
Refills: 0 | Status: DISCONTINUED | OUTPATIENT
Start: 2023-01-01 | End: 2023-01-01

## 2023-01-01 RX ORDER — AMLODIPINE BESYLATE 2.5 MG/1
5 TABLET ORAL DAILY
Refills: 0 | Status: DISCONTINUED | OUTPATIENT
Start: 2023-01-01 | End: 2023-01-01

## 2023-01-01 RX ORDER — HYDROMORPHONE HYDROCHLORIDE 2 MG/ML
0.5 INJECTION INTRAMUSCULAR; INTRAVENOUS; SUBCUTANEOUS EVERY 4 HOURS
Refills: 0 | Status: DISCONTINUED | OUTPATIENT
Start: 2023-01-01 | End: 2023-01-01

## 2023-01-01 RX ORDER — LEVOTHYROXINE SODIUM 125 MCG
40 TABLET ORAL
Refills: 0 | Status: DISCONTINUED | OUTPATIENT
Start: 2023-01-01 | End: 2023-01-01

## 2023-01-01 RX ORDER — SODIUM CHLORIDE 9 MG/ML
250 INJECTION INTRAMUSCULAR; INTRAVENOUS; SUBCUTANEOUS ONCE
Refills: 0 | Status: COMPLETED | OUTPATIENT
Start: 2023-01-01 | End: 2023-01-01

## 2023-01-01 RX ORDER — AMLODIPINE BESYLATE 2.5 MG/1
10 TABLET ORAL DAILY
Refills: 0 | Status: DISCONTINUED | OUTPATIENT
Start: 2023-01-01 | End: 2023-01-01

## 2023-01-01 RX ORDER — HYDRALAZINE HCL 50 MG
10 TABLET ORAL EVERY 8 HOURS
Refills: 0 | Status: DISCONTINUED | OUTPATIENT
Start: 2023-01-01 | End: 2023-01-01

## 2023-01-01 RX ORDER — INSULIN HUMAN 100 [IU]/ML
10 INJECTION, SOLUTION SUBCUTANEOUS ONCE
Refills: 0 | Status: COMPLETED | OUTPATIENT
Start: 2023-01-01 | End: 2023-01-01

## 2023-01-01 RX ORDER — ACETAMINOPHEN 500 MG
1000 TABLET ORAL EVERY 6 HOURS
Refills: 0 | Status: COMPLETED | OUTPATIENT
Start: 2023-01-01 | End: 2023-01-01

## 2023-01-01 RX ORDER — LIDOCAINE HCL 20 MG/ML
10 VIAL (ML) INJECTION ONCE
Refills: 0 | Status: COMPLETED | OUTPATIENT
Start: 2023-01-01 | End: 2023-01-01

## 2023-01-01 RX ORDER — LEVOTHYROXINE SODIUM 125 MCG
40 TABLET ORAL AT BEDTIME
Refills: 0 | Status: DISCONTINUED | OUTPATIENT
Start: 2023-01-01 | End: 2023-01-01

## 2023-01-01 RX ORDER — BENZOCAINE AND MENTHOL 5; 1 G/100ML; G/100ML
1 LIQUID ORAL
Refills: 0 | Status: DISCONTINUED | OUTPATIENT
Start: 2023-01-01 | End: 2023-01-01

## 2023-01-01 RX ORDER — CHLORHEXIDINE GLUCONATE 213 G/1000ML
1 SOLUTION TOPICAL DAILY
Refills: 0 | Status: DISCONTINUED | OUTPATIENT
Start: 2023-01-01 | End: 2023-01-01

## 2023-01-01 RX ORDER — MAGNESIUM SULFATE 500 MG/ML
2 VIAL (ML) INJECTION ONCE
Refills: 0 | Status: COMPLETED | OUTPATIENT
Start: 2023-01-01 | End: 2023-01-01

## 2023-01-01 RX ORDER — NIFEDIPINE 30 MG
30 TABLET, EXTENDED RELEASE 24 HR ORAL ONCE
Refills: 0 | Status: COMPLETED | OUTPATIENT
Start: 2023-01-01 | End: 2023-01-01

## 2023-01-01 RX ORDER — IRON SUCROSE 20 MG/ML
200 INJECTION, SOLUTION INTRAVENOUS EVERY 24 HOURS
Refills: 0 | Status: COMPLETED | OUTPATIENT
Start: 2023-01-01 | End: 2023-01-01

## 2023-01-01 RX ORDER — CHLORHEXIDINE GLUCONATE 213 G/1000ML
15 SOLUTION TOPICAL EVERY 12 HOURS
Refills: 0 | Status: DISCONTINUED | OUTPATIENT
Start: 2023-01-01 | End: 2023-01-01

## 2023-01-01 RX ORDER — LOSARTAN POTASSIUM 100 MG/1
25 TABLET, FILM COATED ORAL DAILY
Refills: 0 | Status: DISCONTINUED | OUTPATIENT
Start: 2023-01-01 | End: 2023-01-01

## 2023-01-01 RX ORDER — FLUCONAZOLE 150 MG/1
800 TABLET ORAL ONCE
Refills: 0 | Status: COMPLETED | OUTPATIENT
Start: 2023-01-01 | End: 2023-01-01

## 2023-01-01 RX ORDER — I.V. FAT EMULSION 20 G/100ML
0.55 EMULSION INTRAVENOUS
Qty: 50 | Refills: 0 | Status: DISCONTINUED | OUTPATIENT
Start: 2023-01-01 | End: 2023-01-01

## 2023-01-01 RX ORDER — AMLODIPINE BESYLATE 2.5 MG/1
5 TABLET ORAL ONCE
Refills: 0 | Status: COMPLETED | OUTPATIENT
Start: 2023-01-01 | End: 2023-01-01

## 2023-01-01 RX ORDER — ALBUMIN HUMAN 25 %
50 VIAL (ML) INTRAVENOUS EVERY 8 HOURS
Refills: 0 | Status: DISCONTINUED | OUTPATIENT
Start: 2023-01-01 | End: 2023-01-01

## 2023-01-01 RX ORDER — URSODIOL 250 MG/1
300 TABLET, FILM COATED ORAL EVERY 8 HOURS
Refills: 0 | Status: DISCONTINUED | OUTPATIENT
Start: 2023-01-01 | End: 2024-01-01

## 2023-01-01 RX ORDER — SODIUM CHLORIDE 9 MG/ML
1000 INJECTION, SOLUTION INTRAVENOUS ONCE
Refills: 0 | Status: DISCONTINUED | OUTPATIENT
Start: 2023-01-01 | End: 2023-01-01

## 2023-01-01 RX ORDER — SODIUM CHLORIDE 5 G/100ML
500 INJECTION, SOLUTION INTRAVENOUS
Refills: 0 | Status: DISCONTINUED | OUTPATIENT
Start: 2023-01-01 | End: 2023-01-01

## 2023-01-01 RX ORDER — HYDRALAZINE HCL 50 MG
20 TABLET ORAL ONCE
Refills: 0 | Status: COMPLETED | OUTPATIENT
Start: 2023-01-01 | End: 2023-01-01

## 2023-01-01 RX ORDER — FLUCONAZOLE 150 MG/1
400 TABLET ORAL EVERY 24 HOURS
Refills: 0 | Status: DISCONTINUED | OUTPATIENT
Start: 2023-01-01 | End: 2023-01-01

## 2023-01-01 RX ORDER — LANOLIN ALCOHOL/MO/W.PET/CERES
5 CREAM (GRAM) TOPICAL ONCE
Refills: 0 | Status: COMPLETED | OUTPATIENT
Start: 2023-01-01 | End: 2023-01-01

## 2023-01-01 RX ORDER — INFLUENZA VIRUS VACCINE 15; 15; 15; 15 UG/.5ML; UG/.5ML; UG/.5ML; UG/.5ML
0.7 SUSPENSION INTRAMUSCULAR ONCE
Refills: 0 | Status: DISCONTINUED | OUTPATIENT
Start: 2023-01-01 | End: 2023-01-01

## 2023-01-01 RX ORDER — LEVOTHYROXINE SODIUM 125 MCG
30 TABLET ORAL AT BEDTIME
Refills: 0 | Status: DISCONTINUED | OUTPATIENT
Start: 2023-01-01 | End: 2024-01-01

## 2023-01-01 RX ORDER — SODIUM CHLORIDE 9 MG/ML
250 INJECTION, SOLUTION INTRAVENOUS ONCE
Refills: 0 | Status: COMPLETED | OUTPATIENT
Start: 2023-01-01 | End: 2023-01-01

## 2023-01-01 RX ORDER — HYDRALAZINE HCL 50 MG
40 TABLET ORAL EVERY 6 HOURS
Refills: 0 | Status: DISCONTINUED | OUTPATIENT
Start: 2023-01-01 | End: 2023-01-01

## 2023-01-01 RX ORDER — CHOLESTYRAMINE 4 G/9G
4 POWDER, FOR SUSPENSION ORAL DAILY
Refills: 0 | Status: DISCONTINUED | OUTPATIENT
Start: 2023-01-01 | End: 2024-01-01

## 2023-01-01 RX ORDER — INSULIN LISPRO 100/ML
VIAL (ML) SUBCUTANEOUS EVERY 6 HOURS
Refills: 0 | Status: DISCONTINUED | OUTPATIENT
Start: 2023-01-01 | End: 2023-01-01

## 2023-01-01 RX ORDER — DEXTROSE 50 % IN WATER 50 %
12.5 SYRINGE (ML) INTRAVENOUS ONCE
Refills: 0 | Status: COMPLETED | OUTPATIENT
Start: 2023-01-01 | End: 2023-01-01

## 2023-01-01 RX ORDER — HYDROMORPHONE HYDROCHLORIDE 2 MG/ML
0.25 INJECTION INTRAMUSCULAR; INTRAVENOUS; SUBCUTANEOUS ONCE
Refills: 0 | Status: DISCONTINUED | OUTPATIENT
Start: 2023-01-01 | End: 2023-01-01

## 2023-01-01 RX ORDER — MORPHINE 10 MG/ML
2 SOLUTION ORAL EVERY 6 HOURS
Refills: 0 | Status: DISCONTINUED | OUTPATIENT
Start: 2023-01-01 | End: 2023-01-01

## 2023-01-01 RX ORDER — FLUCONAZOLE 150 MG/1
600 TABLET ORAL EVERY 24 HOURS
Refills: 0 | Status: COMPLETED | OUTPATIENT
Start: 2023-01-01 | End: 2023-01-01

## 2023-01-01 RX ORDER — IRON SUCROSE 20 MG/ML
300 INJECTION, SOLUTION INTRAVENOUS EVERY 24 HOURS
Refills: 0 | Status: DISCONTINUED | OUTPATIENT
Start: 2023-01-01 | End: 2023-01-01

## 2023-01-01 RX ORDER — IRON SUCROSE 20 MG/ML
200 INJECTION, SOLUTION INTRAVENOUS ONCE
Refills: 0 | Status: COMPLETED | OUTPATIENT
Start: 2023-01-01 | End: 2023-01-01

## 2023-01-01 RX ORDER — SODIUM CHLORIDE 9 MG/ML
750 INJECTION, SOLUTION INTRAVENOUS ONCE
Refills: 0 | Status: COMPLETED | OUTPATIENT
Start: 2023-01-01 | End: 2023-01-01

## 2023-01-01 RX ORDER — IPRATROPIUM/ALBUTEROL SULFATE 18-103MCG
3 AEROSOL WITH ADAPTER (GRAM) INHALATION ONCE
Refills: 0 | Status: COMPLETED | OUTPATIENT
Start: 2023-01-01 | End: 2023-01-01

## 2023-01-01 RX ORDER — CHLORHEXIDINE GLUCONATE 213 G/1000ML
1 SOLUTION TOPICAL
Refills: 0 | Status: DISCONTINUED | OUTPATIENT
Start: 2023-01-01 | End: 2024-01-01

## 2023-01-01 RX ORDER — POTASSIUM CHLORIDE 20 MEQ
40 PACKET (EA) ORAL ONCE
Refills: 0 | Status: DISCONTINUED | OUTPATIENT
Start: 2023-01-01 | End: 2023-01-01

## 2023-01-01 RX ORDER — FUROSEMIDE 40 MG
40 TABLET ORAL ONCE
Refills: 0 | Status: DISCONTINUED | OUTPATIENT
Start: 2023-01-01 | End: 2023-01-01

## 2023-01-01 RX ORDER — OCTREOTIDE ACETATE 200 UG/ML
300 INJECTION, SOLUTION INTRAVENOUS; SUBCUTANEOUS THREE TIMES A DAY
Refills: 0 | Status: DISCONTINUED | OUTPATIENT
Start: 2023-01-01 | End: 2023-01-01

## 2023-01-01 RX ORDER — VANCOMYCIN HCL 1 G
1000 VIAL (EA) INTRAVENOUS ONCE
Refills: 0 | Status: COMPLETED | OUTPATIENT
Start: 2023-01-01 | End: 2023-01-01

## 2023-01-01 RX ORDER — IMIPENEM AND CILASTATIN 250; 250 MG/100ML; MG/100ML
1000 INJECTION, POWDER, FOR SOLUTION INTRAVENOUS EVERY 8 HOURS
Refills: 0 | Status: COMPLETED | OUTPATIENT
Start: 2023-01-01 | End: 2023-01-01

## 2023-01-01 RX ORDER — LOPERAMIDE HCL 2 MG
2 TABLET ORAL EVERY 12 HOURS
Refills: 0 | Status: DISCONTINUED | OUTPATIENT
Start: 2023-01-01 | End: 2023-01-01

## 2023-01-01 RX ORDER — ERGOCALCIFEROL 1.25 MG/1
50000 CAPSULE ORAL
Refills: 0 | Status: DISCONTINUED | OUTPATIENT
Start: 2024-01-01 | End: 2024-01-01

## 2023-01-01 RX ORDER — LANOLIN ALCOHOL/MO/W.PET/CERES
10 CREAM (GRAM) TOPICAL ONCE
Refills: 0 | Status: COMPLETED | OUTPATIENT
Start: 2023-01-01 | End: 2023-01-01

## 2023-01-01 RX ORDER — CEFEPIME 1 G/1
2000 INJECTION, POWDER, FOR SOLUTION INTRAMUSCULAR; INTRAVENOUS ONCE
Refills: 0 | Status: DISCONTINUED | OUTPATIENT
Start: 2023-01-01 | End: 2023-01-01

## 2023-01-01 RX ORDER — PANTOPRAZOLE SODIUM 20 MG/1
40 TABLET, DELAYED RELEASE ORAL
Refills: 0 | Status: DISCONTINUED | OUTPATIENT
Start: 2023-01-01 | End: 2023-01-01

## 2023-01-01 RX ORDER — CHLORHEXIDINE GLUCONATE 213 G/1000ML
15 SOLUTION TOPICAL
Refills: 0 | Status: DISCONTINUED | OUTPATIENT
Start: 2023-01-01 | End: 2024-01-01

## 2023-01-01 RX ORDER — SODIUM,POTASSIUM PHOSPHATES 278-250MG
1 POWDER IN PACKET (EA) ORAL THREE TIMES A DAY
Refills: 0 | Status: DISCONTINUED | OUTPATIENT
Start: 2023-01-01 | End: 2023-01-01

## 2023-01-01 RX ORDER — KETOROLAC TROMETHAMINE 30 MG/ML
15 SYRINGE (ML) INJECTION EVERY 12 HOURS
Refills: 0 | Status: DISCONTINUED | OUTPATIENT
Start: 2023-01-01 | End: 2023-01-01

## 2023-01-01 RX ORDER — ERYTHROMYCIN ETHYLSUCCINATE 400 MG
250 TABLET ORAL EVERY 6 HOURS
Refills: 0 | Status: DISCONTINUED | OUTPATIENT
Start: 2023-01-01 | End: 2023-01-01

## 2023-01-01 RX ORDER — PROPOFOL 10 MG/ML
10 INJECTION, EMULSION INTRAVENOUS
Qty: 1000 | Refills: 0 | Status: DISCONTINUED | OUTPATIENT
Start: 2023-01-01 | End: 2023-01-01

## 2023-01-01 RX ORDER — IMIPENEM AND CILASTATIN 250; 250 MG/100ML; MG/100ML
1000 INJECTION, POWDER, FOR SOLUTION INTRAVENOUS EVERY 8 HOURS
Refills: 0 | Status: DISCONTINUED | OUTPATIENT
Start: 2023-01-01 | End: 2023-01-01

## 2023-01-01 RX ORDER — MORPHINE 10 MG/ML
6 SOLUTION ORAL
Refills: 0 | Status: DISCONTINUED | OUTPATIENT
Start: 2023-01-01 | End: 2023-01-01

## 2023-01-01 RX ORDER — BENZOCAINE 10 %
1 GEL (GRAM) MUCOUS MEMBRANE EVERY 6 HOURS
Refills: 0 | Status: DISCONTINUED | OUTPATIENT
Start: 2023-01-01 | End: 2023-01-01

## 2023-01-01 RX ORDER — DEXTROSE 10 % IN WATER 10 %
500 INTRAVENOUS SOLUTION INTRAVENOUS
Refills: 0 | Status: DISCONTINUED | OUTPATIENT
Start: 2023-01-01 | End: 2023-01-01

## 2023-01-01 RX ORDER — CEFTRIAXONE 500 MG/1
INJECTION, POWDER, FOR SOLUTION INTRAMUSCULAR; INTRAVENOUS
Refills: 0 | Status: DISCONTINUED | OUTPATIENT
Start: 2023-01-01 | End: 2023-01-01

## 2023-01-01 RX ORDER — LIDOCAINE HCL 20 MG/ML
10 VIAL (ML) INJECTION EVERY 12 HOURS
Refills: 0 | Status: DISCONTINUED | OUTPATIENT
Start: 2023-01-01 | End: 2023-01-01

## 2023-01-01 RX ORDER — HYDRALAZINE HCL 50 MG
10 TABLET ORAL EVERY 4 HOURS
Refills: 0 | Status: DISCONTINUED | OUTPATIENT
Start: 2023-01-01 | End: 2023-01-01

## 2023-01-01 RX ORDER — POTASSIUM CHLORIDE 20 MEQ
10 PACKET (EA) ORAL ONCE
Refills: 0 | Status: DISCONTINUED | OUTPATIENT
Start: 2023-01-01 | End: 2023-01-01

## 2023-01-01 RX ORDER — CASPOFUNGIN ACETATE 7 MG/ML
50 INJECTION, POWDER, LYOPHILIZED, FOR SOLUTION INTRAVENOUS EVERY 24 HOURS
Refills: 0 | Status: DISCONTINUED | OUTPATIENT
Start: 2023-01-01 | End: 2023-01-01

## 2023-01-01 RX ORDER — OCTREOTIDE ACETATE 200 UG/ML
200 INJECTION, SOLUTION INTRAVENOUS; SUBCUTANEOUS EVERY 8 HOURS
Refills: 0 | Status: DISCONTINUED | OUTPATIENT
Start: 2023-01-01 | End: 2023-01-01

## 2023-01-01 RX ORDER — ERYTHROMYCIN ETHYLSUCCINATE 400 MG
250 TABLET ORAL
Refills: 0 | Status: DISCONTINUED | OUTPATIENT
Start: 2023-01-01 | End: 2023-01-01

## 2023-01-01 RX ORDER — ERYTHROPOIETIN 10000 [IU]/ML
10000 INJECTION, SOLUTION INTRAVENOUS; SUBCUTANEOUS
Refills: 0 | Status: DISCONTINUED | OUTPATIENT
Start: 2023-01-01 | End: 2024-01-01

## 2023-01-01 RX ORDER — MECLIZINE HCL 12.5 MG
25 TABLET ORAL EVERY 6 HOURS
Refills: 0 | Status: DISCONTINUED | OUTPATIENT
Start: 2023-01-01 | End: 2023-01-01

## 2023-01-01 RX ORDER — LIDOCAINE 4 G/100G
10 CREAM TOPICAL EVERY 12 HOURS
Refills: 0 | Status: DISCONTINUED | OUTPATIENT
Start: 2023-01-01 | End: 2024-01-01

## 2023-01-01 RX ORDER — SODIUM CHLORIDE 5 G/100ML
1000 INJECTION, SOLUTION INTRAVENOUS
Refills: 0 | Status: DISCONTINUED | OUTPATIENT
Start: 2023-01-01 | End: 2023-01-01

## 2023-01-01 RX ORDER — FENTANYL CITRATE 50 UG/ML
0.5 INJECTION INTRAVENOUS
Qty: 2500 | Refills: 0 | Status: DISCONTINUED | OUTPATIENT
Start: 2023-01-01 | End: 2023-01-01

## 2023-01-01 RX ORDER — CHOLESTYRAMINE 4 G/9G
4 POWDER, FOR SUSPENSION ORAL
Refills: 0 | Status: DISCONTINUED | OUTPATIENT
Start: 2023-01-01 | End: 2023-01-01

## 2023-01-01 RX ORDER — ERYTHROPOIETIN 10000 [IU]/ML
10000 INJECTION, SOLUTION INTRAVENOUS; SUBCUTANEOUS
Refills: 0 | Status: DISCONTINUED | OUTPATIENT
Start: 2023-01-01 | End: 2023-01-01

## 2023-01-01 RX ORDER — LIDOCAINE HYDROCHLORIDE AND EPINEPHRINE 10; 10 MG/ML; UG/ML
20 INJECTION, SOLUTION INFILTRATION; PERINEURAL ONCE
Refills: 0 | Status: COMPLETED | OUTPATIENT
Start: 2023-01-01 | End: 2023-01-01

## 2023-01-01 RX ORDER — ENOXAPARIN SODIUM 100 MG/ML
90 INJECTION SUBCUTANEOUS EVERY 12 HOURS
Refills: 0 | Status: DISCONTINUED | OUTPATIENT
Start: 2023-01-01 | End: 2023-01-01

## 2023-01-01 RX ORDER — METOPROLOL TARTRATE 50 MG
5 TABLET ORAL ONCE
Refills: 0 | Status: COMPLETED | OUTPATIENT
Start: 2023-01-01 | End: 2023-01-01

## 2023-01-01 RX ORDER — LABETALOL HCL 100 MG
10 TABLET ORAL ONCE
Refills: 0 | Status: DISCONTINUED | OUTPATIENT
Start: 2023-01-01 | End: 2023-01-01

## 2023-01-01 RX ORDER — LABETALOL HCL 100 MG
20 TABLET ORAL ONCE
Refills: 0 | Status: COMPLETED | OUTPATIENT
Start: 2023-01-01 | End: 2023-01-01

## 2023-01-01 RX ORDER — CEFEPIME 1 G/1
2000 INJECTION, POWDER, FOR SOLUTION INTRAMUSCULAR; INTRAVENOUS EVERY 8 HOURS
Refills: 0 | Status: COMPLETED | OUTPATIENT
Start: 2023-01-01 | End: 2023-01-01

## 2023-01-01 RX ORDER — POTASSIUM PHOSPHATE, MONOBASIC POTASSIUM PHOSPHATE, DIBASIC 236; 224 MG/ML; MG/ML
30 INJECTION, SOLUTION INTRAVENOUS ONCE
Refills: 0 | Status: COMPLETED | OUTPATIENT
Start: 2023-01-01 | End: 2023-01-01

## 2023-01-01 RX ORDER — ONDANSETRON 8 MG/1
4 TABLET, FILM COATED ORAL ONCE
Refills: 0 | Status: DISCONTINUED | OUTPATIENT
Start: 2023-01-01 | End: 2023-01-01

## 2023-01-01 RX ORDER — ALBUMIN HUMAN 25 %
50 VIAL (ML) INTRAVENOUS EVERY 8 HOURS
Refills: 0 | Status: COMPLETED | OUTPATIENT
Start: 2023-01-01 | End: 2023-01-01

## 2023-01-01 RX ORDER — KETOROLAC TROMETHAMINE 30 MG/ML
15 SYRINGE (ML) INJECTION EVERY 6 HOURS
Refills: 0 | Status: DISCONTINUED | OUTPATIENT
Start: 2023-01-01 | End: 2023-01-01

## 2023-01-01 RX ORDER — HALOPERIDOL DECANOATE 100 MG/ML
2 INJECTION INTRAMUSCULAR ONCE
Refills: 0 | Status: COMPLETED | OUTPATIENT
Start: 2023-01-01 | End: 2023-01-01

## 2023-01-01 RX ORDER — VENLAFAXINE HCL 75 MG
150 CAPSULE, EXT RELEASE 24 HR ORAL DAILY
Refills: 0 | Status: DISCONTINUED | OUTPATIENT
Start: 2023-01-01 | End: 2024-01-01

## 2023-01-01 RX ORDER — SODIUM CHLORIDE 9 MG/ML
2000 INJECTION INTRAMUSCULAR; INTRAVENOUS; SUBCUTANEOUS ONCE
Refills: 0 | Status: COMPLETED | OUTPATIENT
Start: 2023-01-01 | End: 2023-01-01

## 2023-01-01 RX ORDER — LABETALOL HCL 100 MG
10 TABLET ORAL EVERY 6 HOURS
Refills: 0 | Status: DISCONTINUED | OUTPATIENT
Start: 2023-01-01 | End: 2023-01-01

## 2023-01-01 RX ORDER — LANOLIN ALCOHOL/MO/W.PET/CERES
10 CREAM (GRAM) TOPICAL AT BEDTIME
Refills: 0 | Status: DISCONTINUED | OUTPATIENT
Start: 2023-01-01 | End: 2023-01-01

## 2023-01-01 RX ORDER — METOPROLOL TARTRATE 50 MG
50 TABLET ORAL
Refills: 0 | Status: DISCONTINUED | OUTPATIENT
Start: 2023-01-01 | End: 2023-01-01

## 2023-01-01 RX ORDER — LANOLIN ALCOHOL/MO/W.PET/CERES
5 CREAM (GRAM) TOPICAL AT BEDTIME
Refills: 0 | Status: DISCONTINUED | OUTPATIENT
Start: 2023-01-01 | End: 2023-01-01

## 2023-01-01 RX ORDER — SODIUM CHLORIDE 9 MG/ML
4 INJECTION INTRAMUSCULAR; INTRAVENOUS; SUBCUTANEOUS ONCE
Refills: 0 | Status: DISCONTINUED | OUTPATIENT
Start: 2023-01-01 | End: 2023-01-01

## 2023-01-01 RX ORDER — BENZOCAINE AND MENTHOL 5; 1 G/100ML; G/100ML
2 LIQUID ORAL EVERY 4 HOURS
Refills: 0 | Status: DISCONTINUED | OUTPATIENT
Start: 2023-01-01 | End: 2023-01-01

## 2023-01-01 RX ORDER — DIPHENHYDRAMINE HYDROCHLORIDE AND LIDOCAINE HYDROCHLORIDE AND ALUMINUM HYDROXIDE AND MAGNESIUM HYDRO
10 KIT THREE TIMES A DAY
Refills: 0 | Status: DISCONTINUED | OUTPATIENT
Start: 2023-01-01 | End: 2023-01-01

## 2023-01-01 RX ORDER — THIAMINE MONONITRATE (VIT B1) 100 MG
100 TABLET ORAL EVERY 24 HOURS
Refills: 0 | Status: DISCONTINUED | OUTPATIENT
Start: 2023-01-01 | End: 2023-01-01

## 2023-01-01 RX ORDER — LIDOCAINE HCL 20 MG/ML
5 VIAL (ML) INJECTION ONCE
Refills: 0 | Status: COMPLETED | OUTPATIENT
Start: 2023-01-01 | End: 2023-01-01

## 2023-01-01 RX ORDER — I.V. FAT EMULSION 20 G/100ML
0.53 EMULSION INTRAVENOUS
Qty: 50 | Refills: 0 | Status: DISCONTINUED | OUTPATIENT
Start: 2023-01-01 | End: 2023-01-01

## 2023-01-01 RX ORDER — HYDRALAZINE HCL 50 MG
10 TABLET ORAL EVERY 6 HOURS
Refills: 0 | Status: DISCONTINUED | OUTPATIENT
Start: 2023-01-01 | End: 2024-01-01

## 2023-01-01 RX ORDER — CEFTRIAXONE 500 MG/1
2000 INJECTION, POWDER, FOR SOLUTION INTRAMUSCULAR; INTRAVENOUS EVERY 24 HOURS
Refills: 0 | Status: DISCONTINUED | OUTPATIENT
Start: 2023-01-01 | End: 2023-01-01

## 2023-01-01 RX ORDER — HYDRALAZINE HCL 50 MG
5 TABLET ORAL ONCE
Refills: 0 | Status: DISCONTINUED | OUTPATIENT
Start: 2023-01-01 | End: 2023-01-01

## 2023-01-01 RX ORDER — LANOLIN ALCOHOL/MO/W.PET/CERES
3 CREAM (GRAM) TOPICAL AT BEDTIME
Refills: 0 | Status: DISCONTINUED | OUTPATIENT
Start: 2023-01-01 | End: 2023-01-01

## 2023-01-01 RX ORDER — GLUCAGON INJECTION, SOLUTION 0.5 MG/.1ML
1 INJECTION, SOLUTION SUBCUTANEOUS ONCE
Refills: 0 | Status: DISCONTINUED | OUTPATIENT
Start: 2023-01-01 | End: 2024-01-01

## 2023-01-01 RX ORDER — CEFEPIME 1 G/1
INJECTION, POWDER, FOR SOLUTION INTRAMUSCULAR; INTRAVENOUS
Refills: 0 | Status: DISCONTINUED | OUTPATIENT
Start: 2023-01-01 | End: 2023-01-01

## 2023-01-01 RX ORDER — DEXMEDETOMIDINE HYDROCHLORIDE IN 0.9% SODIUM CHLORIDE 4 UG/ML
0.5 INJECTION INTRAVENOUS
Qty: 400 | Refills: 0 | Status: DISCONTINUED | OUTPATIENT
Start: 2023-01-01 | End: 2023-01-01

## 2023-01-01 RX ORDER — IMIPENEM AND CILASTATIN 250; 250 MG/100ML; MG/100ML
500 INJECTION, POWDER, FOR SOLUTION INTRAVENOUS EVERY 8 HOURS
Refills: 0 | Status: DISCONTINUED | OUTPATIENT
Start: 2023-01-01 | End: 2023-01-01

## 2023-01-01 RX ORDER — URSODIOL 250 MG/1
300 TABLET, FILM COATED ORAL EVERY 8 HOURS
Refills: 0 | Status: DISCONTINUED | OUTPATIENT
Start: 2023-01-01 | End: 2023-01-01

## 2023-01-01 RX ORDER — SIMETHICONE 80 MG/1
80 TABLET, CHEWABLE ORAL DAILY
Refills: 0 | Status: DISCONTINUED | OUTPATIENT
Start: 2023-01-01 | End: 2023-01-01

## 2023-01-01 RX ORDER — FENTANYL CITRATE 50 UG/ML
25 INJECTION INTRAVENOUS
Refills: 0 | Status: DISCONTINUED | OUTPATIENT
Start: 2023-01-01 | End: 2023-01-01

## 2023-01-01 RX ORDER — CALCIUM GLUCONATE 100 MG/ML
1 VIAL (ML) INTRAVENOUS ONCE
Refills: 0 | Status: DISCONTINUED | OUTPATIENT
Start: 2023-01-01 | End: 2023-01-01

## 2023-01-01 RX ORDER — GABAPENTIN 400 MG/1
100 CAPSULE ORAL AT BEDTIME
Refills: 0 | Status: DISCONTINUED | OUTPATIENT
Start: 2023-01-01 | End: 2024-01-01

## 2023-01-01 RX ORDER — ALBUMIN HUMAN 25 %
50 VIAL (ML) INTRAVENOUS EVERY 6 HOURS
Refills: 0 | Status: DISCONTINUED | OUTPATIENT
Start: 2023-01-01 | End: 2023-01-01

## 2023-01-01 RX ORDER — BENZOCAINE AND MENTHOL 5; 1 G/100ML; G/100ML
1 LIQUID ORAL THREE TIMES A DAY
Refills: 0 | Status: DISCONTINUED | OUTPATIENT
Start: 2023-01-01 | End: 2023-01-01

## 2023-01-01 RX ORDER — CEFEPIME 1 G/1
2000 INJECTION, POWDER, FOR SOLUTION INTRAMUSCULAR; INTRAVENOUS ONCE
Refills: 0 | Status: COMPLETED | OUTPATIENT
Start: 2023-01-01 | End: 2023-01-01

## 2023-01-01 RX ORDER — ALLOPURINOL 300 MG
300 TABLET ORAL DAILY
Refills: 0 | Status: DISCONTINUED | OUTPATIENT
Start: 2023-01-01 | End: 2023-01-01

## 2023-01-01 RX ORDER — FUROSEMIDE 40 MG
60 TABLET ORAL ONCE
Refills: 0 | Status: COMPLETED | OUTPATIENT
Start: 2023-01-01 | End: 2023-01-01

## 2023-01-01 RX ORDER — BENZOCAINE 10 %
1 GEL (GRAM) MUCOUS MEMBRANE ONCE
Refills: 0 | Status: COMPLETED | OUTPATIENT
Start: 2023-01-01 | End: 2023-01-01

## 2023-01-01 RX ORDER — LABETALOL HCL 100 MG
10 TABLET ORAL EVERY 6 HOURS
Refills: 0 | Status: DISCONTINUED | OUTPATIENT
Start: 2023-01-01 | End: 2024-01-01

## 2023-01-01 RX ORDER — CASPOFUNGIN ACETATE 7 MG/ML
70 INJECTION, POWDER, LYOPHILIZED, FOR SOLUTION INTRAVENOUS ONCE
Refills: 0 | Status: COMPLETED | OUTPATIENT
Start: 2023-01-01 | End: 2023-01-01

## 2023-01-01 RX ORDER — METOPROLOL TARTRATE 50 MG
7.5 TABLET ORAL EVERY 6 HOURS
Refills: 0 | Status: DISCONTINUED | OUTPATIENT
Start: 2023-01-01 | End: 2023-01-01

## 2023-01-01 RX ORDER — HYDROMORPHONE HYDROCHLORIDE 2 MG/ML
1 INJECTION INTRAMUSCULAR; INTRAVENOUS; SUBCUTANEOUS ONCE
Refills: 0 | Status: DISCONTINUED | OUTPATIENT
Start: 2023-01-01 | End: 2023-01-01

## 2023-01-01 RX ORDER — LOPERAMIDE HCL 2 MG
2 TABLET ORAL ONCE
Refills: 0 | Status: COMPLETED | OUTPATIENT
Start: 2023-01-01 | End: 2023-01-01

## 2023-01-01 RX ADMIN — PANTOPRAZOLE SODIUM 40 MILLIGRAM(S): 20 TABLET, DELAYED RELEASE ORAL at 11:44

## 2023-01-01 RX ADMIN — OCTREOTIDE ACETATE 100 MICROGRAM(S): 200 INJECTION, SOLUTION INTRAVENOUS; SUBCUTANEOUS at 02:13

## 2023-01-01 RX ADMIN — NYSTATIN CREAM 1 APPLICATION(S): 100000 CREAM TOPICAL at 13:48

## 2023-01-01 RX ADMIN — Medication 5 MILLIGRAM(S): at 19:16

## 2023-01-01 RX ADMIN — HEPARIN SODIUM 5000 UNIT(S): 5000 INJECTION INTRAVENOUS; SUBCUTANEOUS at 21:19

## 2023-01-01 RX ADMIN — CHLORHEXIDINE GLUCONATE 15 MILLILITER(S): 213 SOLUTION TOPICAL at 05:35

## 2023-01-01 RX ADMIN — LOSARTAN POTASSIUM 25 MILLIGRAM(S): 100 TABLET, FILM COATED ORAL at 07:56

## 2023-01-01 RX ADMIN — Medication 5 MILLIGRAM(S): at 12:15

## 2023-01-01 RX ADMIN — Medication 4 MILLIGRAM(S): at 12:24

## 2023-01-01 RX ADMIN — CHOLESTYRAMINE 4 GRAM(S): 4 POWDER, FOR SUSPENSION ORAL at 22:37

## 2023-01-01 RX ADMIN — MORPHINE 2 MILLIGRAM(S): 10 SOLUTION ORAL at 18:24

## 2023-01-01 RX ADMIN — HEPARIN SODIUM 5000 UNIT(S): 5000 INJECTION INTRAVENOUS; SUBCUTANEOUS at 22:02

## 2023-01-01 RX ADMIN — HEPARIN SODIUM 5000 UNIT(S): 5000 INJECTION INTRAVENOUS; SUBCUTANEOUS at 16:14

## 2023-01-01 RX ADMIN — CHLORHEXIDINE GLUCONATE 1 APPLICATION(S): 213 SOLUTION TOPICAL at 06:17

## 2023-01-01 RX ADMIN — SODIUM CHLORIDE 1000 MILLILITER(S): 9 INJECTION, SOLUTION INTRAVENOUS at 00:43

## 2023-01-01 RX ADMIN — CEFTRIAXONE 100 MILLIGRAM(S): 500 INJECTION, POWDER, FOR SOLUTION INTRAMUSCULAR; INTRAVENOUS at 22:55

## 2023-01-01 RX ADMIN — I.V. FAT EMULSION 41.67 GM/KG/DAY: 20 EMULSION INTRAVENOUS at 19:23

## 2023-01-01 RX ADMIN — SCOPALAMINE 1 PATCH: 1 PATCH, EXTENDED RELEASE TRANSDERMAL at 21:52

## 2023-01-01 RX ADMIN — CHLORHEXIDINE GLUCONATE 1 APPLICATION(S): 213 SOLUTION TOPICAL at 19:48

## 2023-01-01 RX ADMIN — OCTREOTIDE ACETATE 100 MICROGRAM(S): 200 INJECTION, SOLUTION INTRAVENOUS; SUBCUTANEOUS at 17:21

## 2023-01-01 RX ADMIN — HEPARIN SODIUM 5000 UNIT(S): 5000 INJECTION INTRAVENOUS; SUBCUTANEOUS at 21:51

## 2023-01-01 RX ADMIN — Medication 300 MILLIGRAM(S): at 10:58

## 2023-01-01 RX ADMIN — Medication 0.5 MILLIGRAM(S): at 23:56

## 2023-01-01 RX ADMIN — HEPARIN SODIUM 5000 UNIT(S): 5000 INJECTION INTRAVENOUS; SUBCUTANEOUS at 06:44

## 2023-01-01 RX ADMIN — Medication 1 EACH: at 17:00

## 2023-01-01 RX ADMIN — CEFEPIME 100 MILLIGRAM(S): 1 INJECTION, POWDER, FOR SOLUTION INTRAMUSCULAR; INTRAVENOUS at 18:00

## 2023-01-01 RX ADMIN — Medication 1 SPRAY(S): at 19:55

## 2023-01-01 RX ADMIN — ERGOCALCIFEROL 50000 UNIT(S): 1.25 CAPSULE ORAL at 15:43

## 2023-01-01 RX ADMIN — Medication 50 MILLILITER(S): at 13:27

## 2023-01-01 RX ADMIN — Medication 150 MILLIGRAM(S): at 06:18

## 2023-01-01 RX ADMIN — CHLORHEXIDINE GLUCONATE 1 APPLICATION(S): 213 SOLUTION TOPICAL at 05:47

## 2023-01-01 RX ADMIN — Medication 10 MILLIGRAM(S): at 19:02

## 2023-01-01 RX ADMIN — HEPARIN SODIUM 5000 UNIT(S): 5000 INJECTION INTRAVENOUS; SUBCUTANEOUS at 13:01

## 2023-01-01 RX ADMIN — Medication 40 MILLIGRAM(S): at 17:48

## 2023-01-01 RX ADMIN — Medication 150 MILLIGRAM(S): at 17:03

## 2023-01-01 RX ADMIN — HEPARIN SODIUM 5000 UNIT(S): 5000 INJECTION INTRAVENOUS; SUBCUTANEOUS at 05:35

## 2023-01-01 RX ADMIN — Medication 5 MILLIGRAM(S): at 17:51

## 2023-01-01 RX ADMIN — Medication 10.6 MICROGRAM(S)/KG/MIN: at 13:48

## 2023-01-01 RX ADMIN — CHOLESTYRAMINE 4 GRAM(S): 4 POWDER, FOR SUSPENSION ORAL at 09:40

## 2023-01-01 RX ADMIN — Medication 10 MILLIGRAM(S): at 18:16

## 2023-01-01 RX ADMIN — Medication 4 MILLIGRAM(S): at 23:02

## 2023-01-01 RX ADMIN — Medication 300 MILLIGRAM(S): at 10:44

## 2023-01-01 RX ADMIN — HEPARIN SODIUM 5000 UNIT(S): 5000 INJECTION INTRAVENOUS; SUBCUTANEOUS at 21:13

## 2023-01-01 RX ADMIN — CHLORHEXIDINE GLUCONATE 1 APPLICATION(S): 213 SOLUTION TOPICAL at 05:17

## 2023-01-01 RX ADMIN — HEPARIN SODIUM 5000 UNIT(S): 5000 INJECTION INTRAVENOUS; SUBCUTANEOUS at 07:20

## 2023-01-01 RX ADMIN — SODIUM CHLORIDE 60 MILLILITER(S): 9 INJECTION, SOLUTION INTRAVENOUS at 10:14

## 2023-01-01 RX ADMIN — HEPARIN SODIUM 5000 UNIT(S): 5000 INJECTION INTRAVENOUS; SUBCUTANEOUS at 06:57

## 2023-01-01 RX ADMIN — Medication 25 GRAM(S): at 09:08

## 2023-01-01 RX ADMIN — HEPARIN SODIUM 5000 UNIT(S): 5000 INJECTION INTRAVENOUS; SUBCUTANEOUS at 22:35

## 2023-01-01 RX ADMIN — Medication 50 MICROGRAM(S): at 05:44

## 2023-01-01 RX ADMIN — Medication 2 TABLET(S): at 10:15

## 2023-01-01 RX ADMIN — CHOLESTYRAMINE 4 GRAM(S): 4 POWDER, FOR SUSPENSION ORAL at 01:09

## 2023-01-01 RX ADMIN — FAMOTIDINE 20 MILLIGRAM(S): 10 INJECTION INTRAVENOUS at 11:22

## 2023-01-01 RX ADMIN — Medication 10 MILLIGRAM(S): at 13:55

## 2023-01-01 RX ADMIN — Medication 4 MILLIGRAM(S): at 19:26

## 2023-01-01 RX ADMIN — HEPARIN SODIUM 5000 UNIT(S): 5000 INJECTION INTRAVENOUS; SUBCUTANEOUS at 13:55

## 2023-01-01 RX ADMIN — CHLORHEXIDINE GLUCONATE 15 MILLILITER(S): 213 SOLUTION TOPICAL at 05:13

## 2023-01-01 RX ADMIN — Medication 40 MICROGRAM(S): at 23:07

## 2023-01-01 RX ADMIN — SODIUM CHLORIDE 120 MILLILITER(S): 9 INJECTION, SOLUTION INTRAVENOUS at 18:24

## 2023-01-01 RX ADMIN — Medication 300 MILLIGRAM(S): at 12:19

## 2023-01-01 RX ADMIN — Medication 150 MILLIGRAM(S): at 15:02

## 2023-01-01 RX ADMIN — CHOLESTYRAMINE 4 GRAM(S): 4 POWDER, FOR SUSPENSION ORAL at 23:35

## 2023-01-01 RX ADMIN — Medication 10 MILLIGRAM(S): at 14:13

## 2023-01-01 RX ADMIN — ENOXAPARIN SODIUM 100 MILLIGRAM(S): 100 INJECTION SUBCUTANEOUS at 05:02

## 2023-01-01 RX ADMIN — Medication 25 MILLIGRAM(S): at 21:25

## 2023-01-01 RX ADMIN — Medication 5 MILLIGRAM(S): at 12:23

## 2023-01-01 RX ADMIN — MORPHINE 6 MILLIGRAM(S): 10 SOLUTION ORAL at 11:58

## 2023-01-01 RX ADMIN — Medication 4 MILLIGRAM(S): at 06:11

## 2023-01-01 RX ADMIN — Medication 2 TABLET(S): at 23:37

## 2023-01-01 RX ADMIN — Medication 3 MILLILITER(S): at 05:37

## 2023-01-01 RX ADMIN — OCTREOTIDE ACETATE 100 MICROGRAM(S): 200 INJECTION, SOLUTION INTRAVENOUS; SUBCUTANEOUS at 17:23

## 2023-01-01 RX ADMIN — Medication 100 MILLIEQUIVALENT(S): at 19:36

## 2023-01-01 RX ADMIN — I.V. FAT EMULSION 20.8 GM/KG/DAY: 20 EMULSION INTRAVENOUS at 19:26

## 2023-01-01 RX ADMIN — MORPHINE 6 MILLIGRAM(S): 10 SOLUTION ORAL at 17:31

## 2023-01-01 RX ADMIN — Medication 40 MICROGRAM(S): at 22:44

## 2023-01-01 RX ADMIN — PANTOPRAZOLE SODIUM 40 MILLIGRAM(S): 20 TABLET, DELAYED RELEASE ORAL at 13:49

## 2023-01-01 RX ADMIN — Medication 20 MILLIGRAM(S): at 23:08

## 2023-01-01 RX ADMIN — HYDROMORPHONE HYDROCHLORIDE 0.5 MILLIGRAM(S): 2 INJECTION INTRAMUSCULAR; INTRAVENOUS; SUBCUTANEOUS at 17:05

## 2023-01-01 RX ADMIN — Medication 65 MILLILITER(S): at 06:00

## 2023-01-01 RX ADMIN — Medication 0.5 MILLIGRAM(S): at 02:43

## 2023-01-01 RX ADMIN — Medication 1000 MILLIGRAM(S): at 00:10

## 2023-01-01 RX ADMIN — Medication 2 TABLET(S): at 05:19

## 2023-01-01 RX ADMIN — Medication 100 GRAM(S): at 18:56

## 2023-01-01 RX ADMIN — Medication 40 MICROGRAM(S): at 22:11

## 2023-01-01 RX ADMIN — HEPARIN SODIUM 5000 UNIT(S): 5000 INJECTION INTRAVENOUS; SUBCUTANEOUS at 06:17

## 2023-01-01 RX ADMIN — Medication 12.5 GRAM(S): at 03:09

## 2023-01-01 RX ADMIN — AMIODARONE HYDROCHLORIDE 200 MILLIGRAM(S): 400 TABLET ORAL at 06:04

## 2023-01-01 RX ADMIN — MORPHINE 6 MILLIGRAM(S): 10 SOLUTION ORAL at 00:27

## 2023-01-01 RX ADMIN — Medication 65 MILLILITER(S): at 14:00

## 2023-01-01 RX ADMIN — Medication 300 MILLIGRAM(S): at 10:31

## 2023-01-01 RX ADMIN — Medication 10 MILLIGRAM(S): at 12:05

## 2023-01-01 RX ADMIN — Medication 1 EACH: at 18:09

## 2023-01-01 RX ADMIN — NYSTATIN CREAM 1 APPLICATION(S): 100000 CREAM TOPICAL at 14:29

## 2023-01-01 RX ADMIN — Medication 1 SPRAY(S): at 06:12

## 2023-01-01 RX ADMIN — MORPHINE 6 MILLIGRAM(S): 10 SOLUTION ORAL at 17:25

## 2023-01-01 RX ADMIN — Medication 5 MILLIGRAM(S): at 03:01

## 2023-01-01 RX ADMIN — Medication 150 MILLIGRAM(S): at 17:29

## 2023-01-01 RX ADMIN — CHOLESTYRAMINE 4 GRAM(S): 4 POWDER, FOR SUSPENSION ORAL at 09:59

## 2023-01-01 RX ADMIN — Medication 1 EACH: at 17:07

## 2023-01-01 RX ADMIN — Medication 250 MILLIGRAM(S): at 10:30

## 2023-01-01 RX ADMIN — NYSTATIN CREAM 1 APPLICATION(S): 100000 CREAM TOPICAL at 06:52

## 2023-01-01 RX ADMIN — Medication 400 MILLIGRAM(S): at 02:55

## 2023-01-01 RX ADMIN — NYSTATIN CREAM 1 APPLICATION(S): 100000 CREAM TOPICAL at 06:20

## 2023-01-01 RX ADMIN — LOSARTAN POTASSIUM 25 MILLIGRAM(S): 100 TABLET, FILM COATED ORAL at 05:36

## 2023-01-01 RX ADMIN — AMLODIPINE BESYLATE 5 MILLIGRAM(S): 2.5 TABLET ORAL at 06:13

## 2023-01-01 RX ADMIN — Medication 2 TABLET(S): at 17:37

## 2023-01-01 RX ADMIN — Medication 25 MILLIGRAM(S): at 10:20

## 2023-01-01 RX ADMIN — HEPARIN SODIUM 5000 UNIT(S): 5000 INJECTION INTRAVENOUS; SUBCUTANEOUS at 06:08

## 2023-01-01 RX ADMIN — OCTREOTIDE ACETATE 100 MICROGRAM(S): 200 INJECTION, SOLUTION INTRAVENOUS; SUBCUTANEOUS at 09:31

## 2023-01-01 RX ADMIN — CHOLESTYRAMINE 4 GRAM(S): 4 POWDER, FOR SUSPENSION ORAL at 21:45

## 2023-01-01 RX ADMIN — Medication 3 MILLILITER(S): at 04:53

## 2023-01-01 RX ADMIN — MORPHINE 2 MILLIGRAM(S): 10 SOLUTION ORAL at 18:33

## 2023-01-01 RX ADMIN — NYSTATIN CREAM 1 APPLICATION(S): 100000 CREAM TOPICAL at 07:12

## 2023-01-01 RX ADMIN — Medication 300 MILLIGRAM(S): at 09:30

## 2023-01-01 RX ADMIN — SODIUM CHLORIDE 100 MILLILITER(S): 5 INJECTION, SOLUTION INTRAVENOUS at 08:44

## 2023-01-01 RX ADMIN — IMIPENEM AND CILASTATIN 250 MILLIGRAM(S): 250; 250 INJECTION, POWDER, FOR SOLUTION INTRAVENOUS at 10:49

## 2023-01-01 RX ADMIN — Medication 4 MILLIGRAM(S): at 06:40

## 2023-01-01 RX ADMIN — Medication 100 MILLIEQUIVALENT(S): at 07:53

## 2023-01-01 RX ADMIN — HEPARIN SODIUM 5000 UNIT(S): 5000 INJECTION INTRAVENOUS; SUBCUTANEOUS at 05:22

## 2023-01-01 RX ADMIN — Medication 2 TABLET(S): at 06:27

## 2023-01-01 RX ADMIN — Medication 50 MICROGRAM(S): at 06:14

## 2023-01-01 RX ADMIN — HEPARIN SODIUM 5000 UNIT(S): 5000 INJECTION INTRAVENOUS; SUBCUTANEOUS at 06:11

## 2023-01-01 RX ADMIN — Medication 2 TABLET(S): at 23:39

## 2023-01-01 RX ADMIN — HYDROMORPHONE HYDROCHLORIDE 0.5 MILLIGRAM(S): 2 INJECTION INTRAMUSCULAR; INTRAVENOUS; SUBCUTANEOUS at 03:30

## 2023-01-01 RX ADMIN — Medication 2 TABLET(S): at 22:15

## 2023-01-01 RX ADMIN — HYDROMORPHONE HYDROCHLORIDE 0.5 MILLIGRAM(S): 2 INJECTION INTRAMUSCULAR; INTRAVENOUS; SUBCUTANEOUS at 20:44

## 2023-01-01 RX ADMIN — Medication 150 MILLIGRAM(S): at 19:45

## 2023-01-01 RX ADMIN — MORPHINE 6 MILLIGRAM(S): 10 SOLUTION ORAL at 22:41

## 2023-01-01 RX ADMIN — ATORVASTATIN CALCIUM 20 MILLIGRAM(S): 80 TABLET, FILM COATED ORAL at 21:38

## 2023-01-01 RX ADMIN — CHOLESTYRAMINE 4 GRAM(S): 4 POWDER, FOR SUSPENSION ORAL at 21:22

## 2023-01-01 RX ADMIN — Medication 5 MILLIGRAM(S): at 18:43

## 2023-01-01 RX ADMIN — Medication 250 MILLIGRAM(S): at 17:05

## 2023-01-01 RX ADMIN — IOHEXOL 30 MILLILITER(S): 300 INJECTION, SOLUTION INTRAVENOUS at 10:36

## 2023-01-01 RX ADMIN — URSODIOL 300 MILLIGRAM(S): 250 TABLET, FILM COATED ORAL at 14:50

## 2023-01-01 RX ADMIN — HEPARIN SODIUM 5000 UNIT(S): 5000 INJECTION INTRAVENOUS; SUBCUTANEOUS at 21:36

## 2023-01-01 RX ADMIN — ONDANSETRON 4 MILLIGRAM(S): 8 TABLET, FILM COATED ORAL at 11:02

## 2023-01-01 RX ADMIN — Medication 25 MILLILITER(S): at 06:20

## 2023-01-01 RX ADMIN — Medication 2 TABLET(S): at 07:56

## 2023-01-01 RX ADMIN — Medication 2 TABLET(S): at 11:55

## 2023-01-01 RX ADMIN — Medication 25 MILLIGRAM(S): at 10:11

## 2023-01-01 RX ADMIN — CHLORHEXIDINE GLUCONATE 1 APPLICATION(S): 213 SOLUTION TOPICAL at 07:55

## 2023-01-01 RX ADMIN — PANTOPRAZOLE SODIUM 40 MILLIGRAM(S): 20 TABLET, DELAYED RELEASE ORAL at 11:17

## 2023-01-01 RX ADMIN — ENOXAPARIN SODIUM 100 MILLIGRAM(S): 100 INJECTION SUBCUTANEOUS at 17:35

## 2023-01-01 RX ADMIN — Medication 1 EACH: at 18:49

## 2023-01-01 RX ADMIN — OCTREOTIDE ACETATE 100 MICROGRAM(S): 200 INJECTION, SOLUTION INTRAVENOUS; SUBCUTANEOUS at 06:54

## 2023-01-01 RX ADMIN — IMIPENEM AND CILASTATIN 100 MILLIGRAM(S): 250; 250 INJECTION, POWDER, FOR SOLUTION INTRAVENOUS at 01:28

## 2023-01-01 RX ADMIN — Medication 4 MILLIGRAM(S): at 23:07

## 2023-01-01 RX ADMIN — Medication 5 MILLIGRAM(S): at 07:18

## 2023-01-01 RX ADMIN — Medication 30 MICROGRAM(S): at 22:59

## 2023-01-01 RX ADMIN — CHOLESTYRAMINE 4 GRAM(S): 4 POWDER, FOR SUSPENSION ORAL at 10:04

## 2023-01-01 RX ADMIN — CHOLESTYRAMINE 4 GRAM(S): 4 POWDER, FOR SUSPENSION ORAL at 09:22

## 2023-01-01 RX ADMIN — NYSTATIN CREAM 1 APPLICATION(S): 100000 CREAM TOPICAL at 14:02

## 2023-01-01 RX ADMIN — AMIODARONE HYDROCHLORIDE 16.7 MG/MIN: 400 TABLET ORAL at 18:15

## 2023-01-01 RX ADMIN — DIPHENHYDRAMINE HYDROCHLORIDE AND LIDOCAINE HYDROCHLORIDE AND ALUMINUM HYDROXIDE AND MAGNESIUM HYDRO 10 MILLILITER(S): KIT at 22:11

## 2023-01-01 RX ADMIN — Medication 65 MILLILITER(S): at 07:00

## 2023-01-01 RX ADMIN — HYDROMORPHONE HYDROCHLORIDE 0.5 MILLIGRAM(S): 2 INJECTION INTRAMUSCULAR; INTRAVENOUS; SUBCUTANEOUS at 10:54

## 2023-01-01 RX ADMIN — Medication 2 TABLET(S): at 06:25

## 2023-01-01 RX ADMIN — MORPHINE 6 MILLIGRAM(S): 10 SOLUTION ORAL at 18:33

## 2023-01-01 RX ADMIN — Medication 1 EACH: at 17:44

## 2023-01-01 RX ADMIN — I.V. FAT EMULSION 41.67 GM/KG/DAY: 20 EMULSION INTRAVENOUS at 17:50

## 2023-01-01 RX ADMIN — Medication 5 MILLIGRAM(S): at 18:04

## 2023-01-01 RX ADMIN — Medication 50 MICROGRAM(S): at 06:50

## 2023-01-01 RX ADMIN — ERYTHROPOIETIN 10000 UNIT(S): 10000 INJECTION, SOLUTION INTRAVENOUS; SUBCUTANEOUS at 06:27

## 2023-01-01 RX ADMIN — Medication 65 MILLILITER(S): at 20:00

## 2023-01-01 RX ADMIN — Medication 10 MILLIGRAM(S): at 12:00

## 2023-01-01 RX ADMIN — OCTREOTIDE ACETATE 100 MICROGRAM(S): 200 INJECTION, SOLUTION INTRAVENOUS; SUBCUTANEOUS at 10:07

## 2023-01-01 RX ADMIN — Medication 1 EACH: at 17:31

## 2023-01-01 RX ADMIN — I.V. FAT EMULSION 41.67 GM/KG/DAY: 20 EMULSION INTRAVENOUS at 17:03

## 2023-01-01 RX ADMIN — OCTREOTIDE ACETATE 100 MICROGRAM(S): 200 INJECTION, SOLUTION INTRAVENOUS; SUBCUTANEOUS at 14:00

## 2023-01-01 RX ADMIN — CHOLESTYRAMINE 4 GRAM(S): 4 POWDER, FOR SUSPENSION ORAL at 09:11

## 2023-01-01 RX ADMIN — URSODIOL 300 MILLIGRAM(S): 250 TABLET, FILM COATED ORAL at 21:53

## 2023-01-01 RX ADMIN — Medication 4 MILLIGRAM(S): at 13:18

## 2023-01-01 RX ADMIN — HEPARIN SODIUM 5000 UNIT(S): 5000 INJECTION INTRAVENOUS; SUBCUTANEOUS at 15:12

## 2023-01-01 RX ADMIN — I.V. FAT EMULSION 20.83 GM/KG/DAY: 20 EMULSION INTRAVENOUS at 17:34

## 2023-01-01 RX ADMIN — CHLORHEXIDINE GLUCONATE 1 APPLICATION(S): 213 SOLUTION TOPICAL at 06:05

## 2023-01-01 RX ADMIN — Medication 5 MILLIGRAM(S): at 16:17

## 2023-01-01 RX ADMIN — HEPARIN SODIUM 5000 UNIT(S): 5000 INJECTION INTRAVENOUS; SUBCUTANEOUS at 21:21

## 2023-01-01 RX ADMIN — Medication 50 MICROGRAM(S): at 07:58

## 2023-01-01 RX ADMIN — Medication 100 MILLIEQUIVALENT(S): at 08:39

## 2023-01-01 RX ADMIN — Medication 4 MILLIGRAM(S): at 23:05

## 2023-01-01 RX ADMIN — OCTREOTIDE ACETATE 100 MICROGRAM(S): 200 INJECTION, SOLUTION INTRAVENOUS; SUBCUTANEOUS at 10:05

## 2023-01-01 RX ADMIN — URSODIOL 300 MILLIGRAM(S): 250 TABLET, FILM COATED ORAL at 22:10

## 2023-01-01 RX ADMIN — Medication 4 MILLIGRAM(S): at 23:15

## 2023-01-01 RX ADMIN — Medication 40 MILLIGRAM(S): at 09:27

## 2023-01-01 RX ADMIN — HYDROMORPHONE HYDROCHLORIDE 0.5 MILLIGRAM(S): 2 INJECTION INTRAMUSCULAR; INTRAVENOUS; SUBCUTANEOUS at 11:57

## 2023-01-01 RX ADMIN — Medication 1 EACH: at 17:24

## 2023-01-01 RX ADMIN — CHLORHEXIDINE GLUCONATE 1 APPLICATION(S): 213 SOLUTION TOPICAL at 06:09

## 2023-01-01 RX ADMIN — Medication 1 EACH: at 17:15

## 2023-01-01 RX ADMIN — HEPARIN SODIUM 5000 UNIT(S): 5000 INJECTION INTRAVENOUS; SUBCUTANEOUS at 14:00

## 2023-01-01 RX ADMIN — Medication 4 MILLIGRAM(S): at 00:29

## 2023-01-01 RX ADMIN — Medication 4 MILLIGRAM(S): at 12:00

## 2023-01-01 RX ADMIN — Medication 400 MILLIGRAM(S): at 02:41

## 2023-01-01 RX ADMIN — ENOXAPARIN SODIUM 100 MILLIGRAM(S): 100 INJECTION SUBCUTANEOUS at 06:17

## 2023-01-01 RX ADMIN — AMLODIPINE BESYLATE 10 MILLIGRAM(S): 2.5 TABLET ORAL at 17:31

## 2023-01-01 RX ADMIN — HEPARIN SODIUM 5000 UNIT(S): 5000 INJECTION INTRAVENOUS; SUBCUTANEOUS at 14:35

## 2023-01-01 RX ADMIN — HYDROMORPHONE HYDROCHLORIDE 0.25 MILLIGRAM(S): 2 INJECTION INTRAMUSCULAR; INTRAVENOUS; SUBCUTANEOUS at 01:30

## 2023-01-01 RX ADMIN — Medication 2 TABLET(S): at 11:50

## 2023-01-01 RX ADMIN — Medication 4 MILLIGRAM(S): at 17:03

## 2023-01-01 RX ADMIN — NYSTATIN CREAM 1 APPLICATION(S): 100000 CREAM TOPICAL at 15:01

## 2023-01-01 RX ADMIN — Medication 5 MILLIGRAM(S): at 11:13

## 2023-01-01 RX ADMIN — HYDROMORPHONE HYDROCHLORIDE 1 MILLIGRAM(S): 2 INJECTION INTRAMUSCULAR; INTRAVENOUS; SUBCUTANEOUS at 17:34

## 2023-01-01 RX ADMIN — MORPHINE 6 MILLIGRAM(S): 10 SOLUTION ORAL at 11:50

## 2023-01-01 RX ADMIN — CHLORHEXIDINE GLUCONATE 1 APPLICATION(S): 213 SOLUTION TOPICAL at 06:26

## 2023-01-01 RX ADMIN — Medication 50 MICROGRAM(S): at 06:15

## 2023-01-01 RX ADMIN — Medication 30 MICROGRAM(S): at 21:34

## 2023-01-01 RX ADMIN — Medication 10 MILLIGRAM(S): at 13:22

## 2023-01-01 RX ADMIN — Medication 4 MILLIGRAM(S): at 23:28

## 2023-01-01 RX ADMIN — Medication 5 MILLIGRAM(S): at 12:24

## 2023-01-01 RX ADMIN — Medication 25 MILLIGRAM(S): at 17:22

## 2023-01-01 RX ADMIN — HEPARIN SODIUM 5000 UNIT(S): 5000 INJECTION INTRAVENOUS; SUBCUTANEOUS at 21:22

## 2023-01-01 RX ADMIN — HEPARIN SODIUM 5000 UNIT(S): 5000 INJECTION INTRAVENOUS; SUBCUTANEOUS at 14:32

## 2023-01-01 RX ADMIN — AMLODIPINE BESYLATE 5 MILLIGRAM(S): 2.5 TABLET ORAL at 05:37

## 2023-01-01 RX ADMIN — HEPARIN SODIUM 5000 UNIT(S): 5000 INJECTION INTRAVENOUS; SUBCUTANEOUS at 14:20

## 2023-01-01 RX ADMIN — HEPARIN SODIUM 5000 UNIT(S): 5000 INJECTION INTRAVENOUS; SUBCUTANEOUS at 05:30

## 2023-01-01 RX ADMIN — CEFEPIME 100 MILLIGRAM(S): 1 INJECTION, POWDER, FOR SOLUTION INTRAMUSCULAR; INTRAVENOUS at 09:17

## 2023-01-01 RX ADMIN — MORPHINE 6 MILLIGRAM(S): 10 SOLUTION ORAL at 12:47

## 2023-01-01 RX ADMIN — NYSTATIN CREAM 1 APPLICATION(S): 100000 CREAM TOPICAL at 22:05

## 2023-01-01 RX ADMIN — CHLORHEXIDINE GLUCONATE 1 APPLICATION(S): 213 SOLUTION TOPICAL at 06:57

## 2023-01-01 RX ADMIN — CHOLESTYRAMINE 4 GRAM(S): 4 POWDER, FOR SUSPENSION ORAL at 10:58

## 2023-01-01 RX ADMIN — CHOLESTYRAMINE 4 GRAM(S): 4 POWDER, FOR SUSPENSION ORAL at 22:00

## 2023-01-01 RX ADMIN — IRON SUCROSE 110 MILLIGRAM(S): 20 INJECTION, SOLUTION INTRAVENOUS at 11:00

## 2023-01-01 RX ADMIN — Medication 1000 MILLIGRAM(S): at 13:30

## 2023-01-01 RX ADMIN — CHOLESTYRAMINE 4 GRAM(S): 4 POWDER, FOR SUSPENSION ORAL at 10:41

## 2023-01-01 RX ADMIN — Medication 40 MICROGRAM(S): at 22:40

## 2023-01-01 RX ADMIN — Medication 5 MILLIGRAM(S): at 23:50

## 2023-01-01 RX ADMIN — CASPOFUNGIN ACETATE 260 MILLIGRAM(S): 7 INJECTION, POWDER, LYOPHILIZED, FOR SOLUTION INTRAVENOUS at 16:31

## 2023-01-01 RX ADMIN — Medication 4 MILLIGRAM(S): at 12:14

## 2023-01-01 RX ADMIN — CEFEPIME 100 MILLIGRAM(S): 1 INJECTION, POWDER, FOR SOLUTION INTRAMUSCULAR; INTRAVENOUS at 18:42

## 2023-01-01 RX ADMIN — Medication 5 MILLIGRAM(S): at 18:47

## 2023-01-01 RX ADMIN — Medication 5 MILLIGRAM(S): at 05:17

## 2023-01-01 RX ADMIN — HEPARIN SODIUM 5000 UNIT(S): 5000 INJECTION INTRAVENOUS; SUBCUTANEOUS at 14:57

## 2023-01-01 RX ADMIN — Medication 25 MILLIGRAM(S): at 17:31

## 2023-01-01 RX ADMIN — AMIODARONE HYDROCHLORIDE 16.7 MG/MIN: 400 TABLET ORAL at 14:39

## 2023-01-01 RX ADMIN — HEPARIN SODIUM 5000 UNIT(S): 5000 INJECTION INTRAVENOUS; SUBCUTANEOUS at 06:00

## 2023-01-01 RX ADMIN — Medication 50 MILLILITER(S): at 21:14

## 2023-01-01 RX ADMIN — Medication 2 TABLET(S): at 17:25

## 2023-01-01 RX ADMIN — AMIODARONE HYDROCHLORIDE 200 MILLIGRAM(S): 400 TABLET ORAL at 06:43

## 2023-01-01 RX ADMIN — LOSARTAN POTASSIUM 25 MILLIGRAM(S): 100 TABLET, FILM COATED ORAL at 07:41

## 2023-01-01 RX ADMIN — Medication 4 MILLIGRAM(S): at 05:59

## 2023-01-01 RX ADMIN — OCTREOTIDE ACETATE 100 MICROGRAM(S): 200 INJECTION, SOLUTION INTRAVENOUS; SUBCUTANEOUS at 22:11

## 2023-01-01 RX ADMIN — Medication 400 MILLIGRAM(S): at 05:40

## 2023-01-01 RX ADMIN — NYSTATIN CREAM 1 APPLICATION(S): 100000 CREAM TOPICAL at 05:40

## 2023-01-01 RX ADMIN — Medication 5 MILLIGRAM(S): at 23:12

## 2023-01-01 RX ADMIN — OCTREOTIDE ACETATE 100 MICROGRAM(S): 200 INJECTION, SOLUTION INTRAVENOUS; SUBCUTANEOUS at 18:06

## 2023-01-01 RX ADMIN — Medication 40 MICROGRAM(S): at 21:54

## 2023-01-01 RX ADMIN — Medication 100 MILLIGRAM(S): at 10:25

## 2023-01-01 RX ADMIN — Medication 40 MILLIEQUIVALENT(S): at 12:23

## 2023-01-01 RX ADMIN — Medication 4 MILLIGRAM(S): at 18:18

## 2023-01-01 RX ADMIN — ATORVASTATIN CALCIUM 20 MILLIGRAM(S): 80 TABLET, FILM COATED ORAL at 21:30

## 2023-01-01 RX ADMIN — Medication 4 MILLIGRAM(S): at 18:10

## 2023-01-01 RX ADMIN — Medication 1 EACH: at 17:49

## 2023-01-01 RX ADMIN — Medication 5 MILLIGRAM(S): at 12:52

## 2023-01-01 RX ADMIN — IMIPENEM AND CILASTATIN 100 MILLIGRAM(S): 250; 250 INJECTION, POWDER, FOR SOLUTION INTRAVENOUS at 09:59

## 2023-01-01 RX ADMIN — ONDANSETRON 4 MILLIGRAM(S): 8 TABLET, FILM COATED ORAL at 12:51

## 2023-01-01 RX ADMIN — Medication 4 MILLIGRAM(S): at 00:24

## 2023-01-01 RX ADMIN — Medication 50 MILLIEQUIVALENT(S): at 11:35

## 2023-01-01 RX ADMIN — MORPHINE 6 MILLIGRAM(S): 10 SOLUTION ORAL at 23:23

## 2023-01-01 RX ADMIN — MORPHINE 6 MILLIGRAM(S): 10 SOLUTION ORAL at 05:37

## 2023-01-01 RX ADMIN — Medication 40 MILLIEQUIVALENT(S): at 07:13

## 2023-01-01 RX ADMIN — I.V. FAT EMULSION 41.67 GM/KG/DAY: 20 EMULSION INTRAVENOUS at 18:11

## 2023-01-01 RX ADMIN — OCTREOTIDE ACETATE 100 MICROGRAM(S): 200 INJECTION, SOLUTION INTRAVENOUS; SUBCUTANEOUS at 13:55

## 2023-01-01 RX ADMIN — HYDROMORPHONE HYDROCHLORIDE 0.5 MILLIGRAM(S): 2 INJECTION INTRAMUSCULAR; INTRAVENOUS; SUBCUTANEOUS at 02:00

## 2023-01-01 RX ADMIN — Medication 2 TABLET(S): at 18:32

## 2023-01-01 RX ADMIN — Medication 5 MILLIGRAM(S): at 04:32

## 2023-01-01 RX ADMIN — Medication 650 MILLIGRAM(S): at 06:09

## 2023-01-01 RX ADMIN — I.V. FAT EMULSION 41.67 GM/KG/DAY: 20 EMULSION INTRAVENOUS at 17:16

## 2023-01-01 RX ADMIN — NYSTATIN CREAM 1 APPLICATION(S): 100000 CREAM TOPICAL at 06:34

## 2023-01-01 RX ADMIN — Medication 25 MILLIGRAM(S): at 21:31

## 2023-01-01 RX ADMIN — Medication 40 MILLIGRAM(S): at 04:08

## 2023-01-01 RX ADMIN — Medication 2 TABLET(S): at 16:04

## 2023-01-01 RX ADMIN — Medication 2: at 13:52

## 2023-01-01 RX ADMIN — MORPHINE 6 MILLIGRAM(S): 10 SOLUTION ORAL at 05:39

## 2023-01-01 RX ADMIN — NYSTATIN CREAM 1 APPLICATION(S): 100000 CREAM TOPICAL at 22:48

## 2023-01-01 RX ADMIN — Medication 2 TABLET(S): at 23:10

## 2023-01-01 RX ADMIN — CHOLESTYRAMINE 4 GRAM(S): 4 POWDER, FOR SUSPENSION ORAL at 16:38

## 2023-01-01 RX ADMIN — HEPARIN SODIUM 5000 UNIT(S): 5000 INJECTION INTRAVENOUS; SUBCUTANEOUS at 22:30

## 2023-01-01 RX ADMIN — Medication 5 MILLIGRAM(S): at 06:07

## 2023-01-01 RX ADMIN — URSODIOL 300 MILLIGRAM(S): 250 TABLET, FILM COATED ORAL at 22:45

## 2023-01-01 RX ADMIN — CHLORHEXIDINE GLUCONATE 15 MILLILITER(S): 213 SOLUTION TOPICAL at 06:51

## 2023-01-01 RX ADMIN — Medication 4 MILLIGRAM(S): at 21:29

## 2023-01-01 RX ADMIN — Medication 25 MILLIGRAM(S): at 06:45

## 2023-01-01 RX ADMIN — Medication 300 MILLIGRAM(S): at 11:35

## 2023-01-01 RX ADMIN — Medication 5 MILLIGRAM(S): at 12:41

## 2023-01-01 RX ADMIN — IMIPENEM AND CILASTATIN 250 MILLIGRAM(S): 250; 250 INJECTION, POWDER, FOR SOLUTION INTRAVENOUS at 09:59

## 2023-01-01 RX ADMIN — Medication 5 MILLIGRAM(S): at 17:08

## 2023-01-01 RX ADMIN — Medication 5 MILLIGRAM(S): at 17:32

## 2023-01-01 RX ADMIN — Medication 300 MILLIGRAM(S): at 09:54

## 2023-01-01 RX ADMIN — Medication 50 MICROGRAM(S): at 11:21

## 2023-01-01 RX ADMIN — HEPARIN SODIUM 5000 UNIT(S): 5000 INJECTION INTRAVENOUS; SUBCUTANEOUS at 15:21

## 2023-01-01 RX ADMIN — Medication 5 MILLIGRAM(S): at 01:03

## 2023-01-01 RX ADMIN — Medication 1 EACH: at 18:12

## 2023-01-01 RX ADMIN — HEPARIN SODIUM 5000 UNIT(S): 5000 INJECTION INTRAVENOUS; SUBCUTANEOUS at 05:38

## 2023-01-01 RX ADMIN — Medication 300 MILLIGRAM(S): at 09:00

## 2023-01-01 RX ADMIN — HEPARIN SODIUM 5000 UNIT(S): 5000 INJECTION INTRAVENOUS; SUBCUTANEOUS at 13:50

## 2023-01-01 RX ADMIN — HEPARIN SODIUM 5000 UNIT(S): 5000 INJECTION INTRAVENOUS; SUBCUTANEOUS at 14:19

## 2023-01-01 RX ADMIN — HEPARIN SODIUM 5000 UNIT(S): 5000 INJECTION INTRAVENOUS; SUBCUTANEOUS at 06:35

## 2023-01-01 RX ADMIN — Medication 4 MILLIGRAM(S): at 17:17

## 2023-01-01 RX ADMIN — HEPARIN SODIUM 5000 UNIT(S): 5000 INJECTION INTRAVENOUS; SUBCUTANEOUS at 05:54

## 2023-01-01 RX ADMIN — OCTREOTIDE ACETATE 100 MICROGRAM(S): 200 INJECTION, SOLUTION INTRAVENOUS; SUBCUTANEOUS at 15:05

## 2023-01-01 RX ADMIN — HEPARIN SODIUM 5000 UNIT(S): 5000 INJECTION INTRAVENOUS; SUBCUTANEOUS at 13:06

## 2023-01-01 RX ADMIN — Medication 0.5 MILLIGRAM(S): at 22:37

## 2023-01-01 RX ADMIN — URSODIOL 300 MILLIGRAM(S): 250 TABLET, FILM COATED ORAL at 05:19

## 2023-01-01 RX ADMIN — I.V. FAT EMULSION 20.83 GM/KG/DAY: 20 EMULSION INTRAVENOUS at 17:52

## 2023-01-01 RX ADMIN — NYSTATIN CREAM 1 APPLICATION(S): 100000 CREAM TOPICAL at 15:30

## 2023-01-01 RX ADMIN — Medication 5 MILLIGRAM(S): at 06:19

## 2023-01-01 RX ADMIN — CHLORHEXIDINE GLUCONATE 1 APPLICATION(S): 213 SOLUTION TOPICAL at 06:53

## 2023-01-01 RX ADMIN — Medication 250 MILLIGRAM(S): at 22:34

## 2023-01-01 RX ADMIN — HEPARIN SODIUM 5000 UNIT(S): 5000 INJECTION INTRAVENOUS; SUBCUTANEOUS at 22:15

## 2023-01-01 RX ADMIN — URSODIOL 300 MILLIGRAM(S): 250 TABLET, FILM COATED ORAL at 22:29

## 2023-01-01 RX ADMIN — Medication 4 MILLIGRAM(S): at 11:03

## 2023-01-01 RX ADMIN — Medication 65 MILLILITER(S): at 09:00

## 2023-01-01 RX ADMIN — HEPARIN SODIUM 5000 UNIT(S): 5000 INJECTION INTRAVENOUS; SUBCUTANEOUS at 15:25

## 2023-01-01 RX ADMIN — CHLORHEXIDINE GLUCONATE 1 APPLICATION(S): 213 SOLUTION TOPICAL at 05:22

## 2023-01-01 RX ADMIN — MORPHINE 6 MILLIGRAM(S): 10 SOLUTION ORAL at 11:39

## 2023-01-01 RX ADMIN — Medication 10 MILLIGRAM(S): at 23:07

## 2023-01-01 RX ADMIN — Medication 5 MILLIGRAM(S): at 06:18

## 2023-01-01 RX ADMIN — Medication 30 MICROGRAM(S): at 22:17

## 2023-01-01 RX ADMIN — Medication 10 MILLIGRAM(S): at 14:27

## 2023-01-01 RX ADMIN — IMIPENEM AND CILASTATIN 250 MILLIGRAM(S): 250; 250 INJECTION, POWDER, FOR SOLUTION INTRAVENOUS at 10:35

## 2023-01-01 RX ADMIN — CHOLESTYRAMINE 4 GRAM(S): 4 POWDER, FOR SUSPENSION ORAL at 17:24

## 2023-01-01 RX ADMIN — CHOLESTYRAMINE 4 GRAM(S): 4 POWDER, FOR SUSPENSION ORAL at 16:12

## 2023-01-01 RX ADMIN — Medication 10 MILLIGRAM(S): at 17:36

## 2023-01-01 RX ADMIN — MORPHINE 6 MILLIGRAM(S): 10 SOLUTION ORAL at 23:04

## 2023-01-01 RX ADMIN — HYDROMORPHONE HYDROCHLORIDE 0.5 MILLIGRAM(S): 2 INJECTION INTRAMUSCULAR; INTRAVENOUS; SUBCUTANEOUS at 11:30

## 2023-01-01 RX ADMIN — Medication 5 MILLIGRAM(S): at 06:27

## 2023-01-01 RX ADMIN — MORPHINE 6 MILLIGRAM(S): 10 SOLUTION ORAL at 23:03

## 2023-01-01 RX ADMIN — SODIUM CHLORIDE 1000 MILLILITER(S): 9 INJECTION, SOLUTION INTRAVENOUS at 03:15

## 2023-01-01 RX ADMIN — HEPARIN SODIUM 5000 UNIT(S): 5000 INJECTION INTRAVENOUS; SUBCUTANEOUS at 06:46

## 2023-01-01 RX ADMIN — HEPARIN SODIUM 5000 UNIT(S): 5000 INJECTION INTRAVENOUS; SUBCUTANEOUS at 22:16

## 2023-01-01 RX ADMIN — Medication 0.5 MILLIGRAM(S): at 00:57

## 2023-01-01 RX ADMIN — HEPARIN SODIUM 5000 UNIT(S): 5000 INJECTION INTRAVENOUS; SUBCUTANEOUS at 21:04

## 2023-01-01 RX ADMIN — Medication 300 MILLIGRAM(S): at 10:06

## 2023-01-01 RX ADMIN — Medication 40 MICROGRAM(S): at 21:51

## 2023-01-01 RX ADMIN — AMIODARONE HYDROCHLORIDE 200 MILLIGRAM(S): 400 TABLET ORAL at 05:36

## 2023-01-01 RX ADMIN — Medication 5 MILLIGRAM(S): at 12:31

## 2023-01-01 RX ADMIN — Medication 150 MILLIGRAM(S): at 17:28

## 2023-01-01 RX ADMIN — CHLORHEXIDINE GLUCONATE 15 MILLILITER(S): 213 SOLUTION TOPICAL at 19:06

## 2023-01-01 RX ADMIN — Medication 10 MILLIGRAM(S): at 12:21

## 2023-01-01 RX ADMIN — Medication 150 MILLIGRAM(S): at 11:14

## 2023-01-01 RX ADMIN — Medication 0.5 MILLIGRAM(S): at 02:30

## 2023-01-01 RX ADMIN — SODIUM CHLORIDE 1000 MILLILITER(S): 9 INJECTION INTRAMUSCULAR; INTRAVENOUS; SUBCUTANEOUS at 14:33

## 2023-01-01 RX ADMIN — HEPARIN SODIUM 5000 UNIT(S): 5000 INJECTION INTRAVENOUS; SUBCUTANEOUS at 08:12

## 2023-01-01 RX ADMIN — Medication 10 MILLIGRAM(S): at 17:34

## 2023-01-01 RX ADMIN — SODIUM CHLORIDE 100 MILLILITER(S): 9 INJECTION, SOLUTION INTRAVENOUS at 08:22

## 2023-01-01 RX ADMIN — Medication 5 MILLIGRAM(S): at 12:38

## 2023-01-01 RX ADMIN — Medication 4 MILLIGRAM(S): at 23:01

## 2023-01-01 RX ADMIN — HEPARIN SODIUM 5000 UNIT(S): 5000 INJECTION INTRAVENOUS; SUBCUTANEOUS at 07:14

## 2023-01-01 RX ADMIN — MORPHINE 6 MILLIGRAM(S): 10 SOLUTION ORAL at 19:00

## 2023-01-01 RX ADMIN — Medication 1 EACH: at 17:02

## 2023-01-01 RX ADMIN — URSODIOL 300 MILLIGRAM(S): 250 TABLET, FILM COATED ORAL at 14:10

## 2023-01-01 RX ADMIN — CEFEPIME 100 MILLIGRAM(S): 1 INJECTION, POWDER, FOR SOLUTION INTRAMUSCULAR; INTRAVENOUS at 17:25

## 2023-01-01 RX ADMIN — Medication 400 MILLIGRAM(S): at 12:14

## 2023-01-01 RX ADMIN — NYSTATIN CREAM 1 APPLICATION(S): 100000 CREAM TOPICAL at 05:29

## 2023-01-01 RX ADMIN — ERYTHROPOIETIN 10000 UNIT(S): 10000 INJECTION, SOLUTION INTRAVENOUS; SUBCUTANEOUS at 12:44

## 2023-01-01 RX ADMIN — Medication 4 MILLIGRAM(S): at 23:14

## 2023-01-01 RX ADMIN — I.V. FAT EMULSION 20.83 GM/KG/DAY: 20 EMULSION INTRAVENOUS at 19:52

## 2023-01-01 RX ADMIN — ENOXAPARIN SODIUM 100 MILLIGRAM(S): 100 INJECTION SUBCUTANEOUS at 05:34

## 2023-01-01 RX ADMIN — PANTOPRAZOLE SODIUM 40 MILLIGRAM(S): 20 TABLET, DELAYED RELEASE ORAL at 13:54

## 2023-01-01 RX ADMIN — Medication 1 EACH: at 18:24

## 2023-01-01 RX ADMIN — Medication 5 MILLIGRAM(S): at 06:28

## 2023-01-01 RX ADMIN — Medication 25 MILLIGRAM(S): at 09:33

## 2023-01-01 RX ADMIN — AMIODARONE HYDROCHLORIDE 200 MILLIGRAM(S): 400 TABLET ORAL at 06:40

## 2023-01-01 RX ADMIN — NYSTATIN CREAM 1 APPLICATION(S): 100000 CREAM TOPICAL at 05:47

## 2023-01-01 RX ADMIN — CHOLESTYRAMINE 4 GRAM(S): 4 POWDER, FOR SUSPENSION ORAL at 23:02

## 2023-01-01 RX ADMIN — CHLORHEXIDINE GLUCONATE 15 MILLILITER(S): 213 SOLUTION TOPICAL at 06:27

## 2023-01-01 RX ADMIN — Medication 1 MILLIGRAM(S): at 22:19

## 2023-01-01 RX ADMIN — URSODIOL 300 MILLIGRAM(S): 250 TABLET, FILM COATED ORAL at 06:20

## 2023-01-01 RX ADMIN — MORPHINE 6 MILLIGRAM(S): 10 SOLUTION ORAL at 17:27

## 2023-01-01 RX ADMIN — Medication 65 MILLILITER(S): at 02:33

## 2023-01-01 RX ADMIN — OCTREOTIDE ACETATE 100 MICROGRAM(S): 200 INJECTION, SOLUTION INTRAVENOUS; SUBCUTANEOUS at 06:48

## 2023-01-01 RX ADMIN — CHLORHEXIDINE GLUCONATE 1 APPLICATION(S): 213 SOLUTION TOPICAL at 05:06

## 2023-01-01 RX ADMIN — Medication 2 TABLET(S): at 23:01

## 2023-01-01 RX ADMIN — CHLORHEXIDINE GLUCONATE 1 APPLICATION(S): 213 SOLUTION TOPICAL at 06:02

## 2023-01-01 RX ADMIN — AMLODIPINE BESYLATE 10 MILLIGRAM(S): 2.5 TABLET ORAL at 05:11

## 2023-01-01 RX ADMIN — AMLODIPINE BESYLATE 10 MILLIGRAM(S): 2.5 TABLET ORAL at 06:12

## 2023-01-01 RX ADMIN — Medication 2: at 11:33

## 2023-01-01 RX ADMIN — Medication 4 MILLIGRAM(S): at 05:21

## 2023-01-01 RX ADMIN — Medication 0.5 MILLIGRAM(S): at 00:53

## 2023-01-01 RX ADMIN — NYSTATIN CREAM 1 APPLICATION(S): 100000 CREAM TOPICAL at 16:02

## 2023-01-01 RX ADMIN — Medication 150 MILLIGRAM(S): at 18:39

## 2023-01-01 RX ADMIN — Medication 100 GRAM(S): at 20:35

## 2023-01-01 RX ADMIN — OCTREOTIDE ACETATE 100 MICROGRAM(S): 200 INJECTION, SOLUTION INTRAVENOUS; SUBCUTANEOUS at 10:32

## 2023-01-01 RX ADMIN — Medication 10 MILLIGRAM(S): at 08:39

## 2023-01-01 RX ADMIN — Medication 2 TABLET(S): at 23:05

## 2023-01-01 RX ADMIN — Medication 10 MILLIGRAM(S): at 07:58

## 2023-01-01 RX ADMIN — CHLORHEXIDINE GLUCONATE 1 APPLICATION(S): 213 SOLUTION TOPICAL at 05:12

## 2023-01-01 RX ADMIN — NYSTATIN CREAM 1 APPLICATION(S): 100000 CREAM TOPICAL at 05:53

## 2023-01-01 RX ADMIN — MORPHINE 6 MILLIGRAM(S): 10 SOLUTION ORAL at 23:05

## 2023-01-01 RX ADMIN — Medication 40 MILLIGRAM(S): at 14:21

## 2023-01-01 RX ADMIN — CHOLESTYRAMINE 4 GRAM(S): 4 POWDER, FOR SUSPENSION ORAL at 22:15

## 2023-01-01 RX ADMIN — Medication 5 MILLIGRAM(S): at 16:21

## 2023-01-01 RX ADMIN — CHLORHEXIDINE GLUCONATE 15 MILLILITER(S): 213 SOLUTION TOPICAL at 05:37

## 2023-01-01 RX ADMIN — HEPARIN SODIUM 5000 UNIT(S): 5000 INJECTION INTRAVENOUS; SUBCUTANEOUS at 14:31

## 2023-01-01 RX ADMIN — NYSTATIN CREAM 1 APPLICATION(S): 100000 CREAM TOPICAL at 23:06

## 2023-01-01 RX ADMIN — OCTREOTIDE ACETATE 100 MICROGRAM(S): 200 INJECTION, SOLUTION INTRAVENOUS; SUBCUTANEOUS at 02:28

## 2023-01-01 RX ADMIN — Medication 4 MILLIGRAM(S): at 00:15

## 2023-01-01 RX ADMIN — CHOLESTYRAMINE 4 GRAM(S): 4 POWDER, FOR SUSPENSION ORAL at 17:33

## 2023-01-01 RX ADMIN — Medication 75 MILLILITER(S): at 01:01

## 2023-01-01 RX ADMIN — Medication 2 TABLET(S): at 12:50

## 2023-01-01 RX ADMIN — Medication 30 MICROGRAM(S): at 22:45

## 2023-01-01 RX ADMIN — HEPARIN SODIUM 5000 UNIT(S): 5000 INJECTION INTRAVENOUS; SUBCUTANEOUS at 07:12

## 2023-01-01 RX ADMIN — Medication 4 MILLIGRAM(S): at 17:50

## 2023-01-01 RX ADMIN — NYSTATIN CREAM 1 APPLICATION(S): 100000 CREAM TOPICAL at 21:15

## 2023-01-01 RX ADMIN — Medication 150 MILLIGRAM(S): at 05:12

## 2023-01-01 RX ADMIN — NYSTATIN CREAM 1 APPLICATION(S): 100000 CREAM TOPICAL at 13:55

## 2023-01-01 RX ADMIN — Medication 5 MILLIGRAM(S): at 23:33

## 2023-01-01 RX ADMIN — Medication 4 MILLIGRAM(S): at 11:06

## 2023-01-01 RX ADMIN — SCOPALAMINE 1 PATCH: 1 PATCH, EXTENDED RELEASE TRANSDERMAL at 06:34

## 2023-01-01 RX ADMIN — HEPARIN SODIUM 5000 UNIT(S): 5000 INJECTION INTRAVENOUS; SUBCUTANEOUS at 14:18

## 2023-01-01 RX ADMIN — HEPARIN SODIUM 5000 UNIT(S): 5000 INJECTION INTRAVENOUS; SUBCUTANEOUS at 13:13

## 2023-01-01 RX ADMIN — CHLORHEXIDINE GLUCONATE 15 MILLILITER(S): 213 SOLUTION TOPICAL at 06:17

## 2023-01-01 RX ADMIN — Medication 10 MILLIGRAM(S): at 05:34

## 2023-01-01 RX ADMIN — MORPHINE 2 MILLIGRAM(S): 10 SOLUTION ORAL at 06:24

## 2023-01-01 RX ADMIN — ENOXAPARIN SODIUM 100 MILLIGRAM(S): 100 INJECTION SUBCUTANEOUS at 17:52

## 2023-01-01 RX ADMIN — Medication 25 MILLIGRAM(S): at 06:36

## 2023-01-01 RX ADMIN — Medication 62.5 MILLIMOLE(S): at 22:54

## 2023-01-01 RX ADMIN — PANTOPRAZOLE SODIUM 40 MILLIGRAM(S): 20 TABLET, DELAYED RELEASE ORAL at 12:30

## 2023-01-01 RX ADMIN — Medication 100 MILLIEQUIVALENT(S): at 07:45

## 2023-01-01 RX ADMIN — CHLORHEXIDINE GLUCONATE 1 APPLICATION(S): 213 SOLUTION TOPICAL at 05:48

## 2023-01-01 RX ADMIN — URSODIOL 300 MILLIGRAM(S): 250 TABLET, FILM COATED ORAL at 06:25

## 2023-01-01 RX ADMIN — URSODIOL 300 MILLIGRAM(S): 250 TABLET, FILM COATED ORAL at 05:54

## 2023-01-01 RX ADMIN — Medication 4 MILLIGRAM(S): at 22:16

## 2023-01-01 RX ADMIN — SODIUM CHLORIDE 1000 MILLILITER(S): 9 INJECTION, SOLUTION INTRAVENOUS at 20:30

## 2023-01-01 RX ADMIN — Medication 50 MICROGRAM(S): at 08:11

## 2023-01-01 RX ADMIN — CHOLESTYRAMINE 4 GRAM(S): 4 POWDER, FOR SUSPENSION ORAL at 21:26

## 2023-01-01 RX ADMIN — Medication 300 MILLIGRAM(S): at 10:00

## 2023-01-01 RX ADMIN — CHLORHEXIDINE GLUCONATE 1 APPLICATION(S): 213 SOLUTION TOPICAL at 12:44

## 2023-01-01 RX ADMIN — Medication 10 MILLIGRAM(S): at 21:58

## 2023-01-01 RX ADMIN — URSODIOL 300 MILLIGRAM(S): 250 TABLET, FILM COATED ORAL at 06:23

## 2023-01-01 RX ADMIN — OCTREOTIDE ACETATE 100 MICROGRAM(S): 200 INJECTION, SOLUTION INTRAVENOUS; SUBCUTANEOUS at 00:47

## 2023-01-01 RX ADMIN — I.V. FAT EMULSION 20.83 GM/KG/DAY: 20 EMULSION INTRAVENOUS at 19:22

## 2023-01-01 RX ADMIN — Medication 1 MILLIGRAM(S): at 22:48

## 2023-01-01 RX ADMIN — MORPHINE 6 MILLIGRAM(S): 10 SOLUTION ORAL at 05:30

## 2023-01-01 RX ADMIN — Medication 300 MILLIGRAM(S): at 11:08

## 2023-01-01 RX ADMIN — URSODIOL 300 MILLIGRAM(S): 250 TABLET, FILM COATED ORAL at 23:31

## 2023-01-01 RX ADMIN — PANTOPRAZOLE SODIUM 40 MILLIGRAM(S): 20 TABLET, DELAYED RELEASE ORAL at 06:03

## 2023-01-01 RX ADMIN — HEPARIN SODIUM 5000 UNIT(S): 5000 INJECTION INTRAVENOUS; SUBCUTANEOUS at 22:07

## 2023-01-01 RX ADMIN — Medication 10 MILLIGRAM(S): at 11:18

## 2023-01-01 RX ADMIN — AMLODIPINE BESYLATE 10 MILLIGRAM(S): 2.5 TABLET ORAL at 05:26

## 2023-01-01 RX ADMIN — Medication 0.5 MILLIGRAM(S): at 14:03

## 2023-01-01 RX ADMIN — Medication 300 MILLIGRAM(S): at 10:02

## 2023-01-01 RX ADMIN — Medication 1 EACH: at 17:27

## 2023-01-01 RX ADMIN — Medication 40 MICROGRAM(S): at 06:42

## 2023-01-01 RX ADMIN — NYSTATIN CREAM 1 APPLICATION(S): 100000 CREAM TOPICAL at 06:00

## 2023-01-01 RX ADMIN — Medication 10 MILLIGRAM(S): at 06:53

## 2023-01-01 RX ADMIN — Medication 5 MILLIGRAM(S): at 10:10

## 2023-01-01 RX ADMIN — ERYTHROPOIETIN 10000 UNIT(S): 10000 INJECTION, SOLUTION INTRAVENOUS; SUBCUTANEOUS at 15:43

## 2023-01-01 RX ADMIN — Medication 1 MILLIGRAM(S): at 11:42

## 2023-01-01 RX ADMIN — Medication 10 MILLIGRAM(S): at 18:33

## 2023-01-01 RX ADMIN — CHLORHEXIDINE GLUCONATE 15 MILLILITER(S): 213 SOLUTION TOPICAL at 05:56

## 2023-01-01 RX ADMIN — Medication 2 TABLET(S): at 12:15

## 2023-01-01 RX ADMIN — Medication 40 MICROGRAM(S): at 21:55

## 2023-01-01 RX ADMIN — PANTOPRAZOLE SODIUM 40 MILLIGRAM(S): 20 TABLET, DELAYED RELEASE ORAL at 11:03

## 2023-01-01 RX ADMIN — HEPARIN SODIUM 5000 UNIT(S): 5000 INJECTION INTRAVENOUS; SUBCUTANEOUS at 05:19

## 2023-01-01 RX ADMIN — NYSTATIN CREAM 1 APPLICATION(S): 100000 CREAM TOPICAL at 13:09

## 2023-01-01 RX ADMIN — Medication 100 GRAM(S): at 02:59

## 2023-01-01 RX ADMIN — HEPARIN SODIUM 5000 UNIT(S): 5000 INJECTION INTRAVENOUS; SUBCUTANEOUS at 06:33

## 2023-01-01 RX ADMIN — HEPARIN SODIUM 5000 UNIT(S): 5000 INJECTION INTRAVENOUS; SUBCUTANEOUS at 06:31

## 2023-01-01 RX ADMIN — HEPARIN SODIUM 5000 UNIT(S): 5000 INJECTION INTRAVENOUS; SUBCUTANEOUS at 05:06

## 2023-01-01 RX ADMIN — Medication 4 MILLIGRAM(S): at 23:24

## 2023-01-01 RX ADMIN — Medication 40 MILLIEQUIVALENT(S): at 07:08

## 2023-01-01 RX ADMIN — Medication 1 EACH: at 19:16

## 2023-01-01 RX ADMIN — Medication 150 MILLIGRAM(S): at 05:03

## 2023-01-01 RX ADMIN — CHOLESTYRAMINE 4 GRAM(S): 4 POWDER, FOR SUSPENSION ORAL at 21:44

## 2023-01-01 RX ADMIN — ATORVASTATIN CALCIUM 20 MILLIGRAM(S): 80 TABLET, FILM COATED ORAL at 21:51

## 2023-01-01 RX ADMIN — NYSTATIN CREAM 1 APPLICATION(S): 100000 CREAM TOPICAL at 14:18

## 2023-01-01 RX ADMIN — OCTREOTIDE ACETATE 100 MICROGRAM(S): 200 INJECTION, SOLUTION INTRAVENOUS; SUBCUTANEOUS at 10:04

## 2023-01-01 RX ADMIN — LOSARTAN POTASSIUM 25 MILLIGRAM(S): 100 TABLET, FILM COATED ORAL at 06:54

## 2023-01-01 RX ADMIN — CHOLESTYRAMINE 4 GRAM(S): 4 POWDER, FOR SUSPENSION ORAL at 21:56

## 2023-01-01 RX ADMIN — NYSTATIN CREAM 1 APPLICATION(S): 100000 CREAM TOPICAL at 15:24

## 2023-01-01 RX ADMIN — Medication 2 TABLET(S): at 17:41

## 2023-01-01 RX ADMIN — URSODIOL 300 MILLIGRAM(S): 250 TABLET, FILM COATED ORAL at 05:05

## 2023-01-01 RX ADMIN — HEPARIN SODIUM 5000 UNIT(S): 5000 INJECTION INTRAVENOUS; SUBCUTANEOUS at 22:52

## 2023-01-01 RX ADMIN — HEPARIN SODIUM 5000 UNIT(S): 5000 INJECTION INTRAVENOUS; SUBCUTANEOUS at 22:13

## 2023-01-01 RX ADMIN — CEFEPIME 100 MILLIGRAM(S): 1 INJECTION, POWDER, FOR SOLUTION INTRAMUSCULAR; INTRAVENOUS at 09:21

## 2023-01-01 RX ADMIN — Medication 10 MILLIGRAM(S): at 23:13

## 2023-01-01 RX ADMIN — URSODIOL 300 MILLIGRAM(S): 250 TABLET, FILM COATED ORAL at 13:55

## 2023-01-01 RX ADMIN — Medication 1 EACH: at 17:40

## 2023-01-01 RX ADMIN — MORPHINE 6 MILLIGRAM(S): 10 SOLUTION ORAL at 01:08

## 2023-01-01 RX ADMIN — Medication 50 MICROGRAM(S): at 06:04

## 2023-01-01 RX ADMIN — SODIUM CHLORIDE 1000 MILLILITER(S): 9 INJECTION, SOLUTION INTRAVENOUS at 14:39

## 2023-01-01 RX ADMIN — Medication 4 MILLIGRAM(S): at 23:30

## 2023-01-01 RX ADMIN — NYSTATIN CREAM 1 APPLICATION(S): 100000 CREAM TOPICAL at 07:48

## 2023-01-01 RX ADMIN — MORPHINE 6 MILLIGRAM(S): 10 SOLUTION ORAL at 18:40

## 2023-01-01 RX ADMIN — MORPHINE 6 MILLIGRAM(S): 10 SOLUTION ORAL at 23:13

## 2023-01-01 RX ADMIN — Medication 25 MILLIGRAM(S): at 21:14

## 2023-01-01 RX ADMIN — Medication 10 MILLIGRAM(S): at 23:02

## 2023-01-01 RX ADMIN — URSODIOL 300 MILLIGRAM(S): 250 TABLET, FILM COATED ORAL at 22:50

## 2023-01-01 RX ADMIN — Medication 250 MILLIGRAM(S): at 16:07

## 2023-01-01 RX ADMIN — Medication 1 MILLIGRAM(S): at 21:18

## 2023-01-01 RX ADMIN — CHLORHEXIDINE GLUCONATE 1 APPLICATION(S): 213 SOLUTION TOPICAL at 05:01

## 2023-01-01 RX ADMIN — AMLODIPINE BESYLATE 10 MILLIGRAM(S): 2.5 TABLET ORAL at 05:41

## 2023-01-01 RX ADMIN — Medication 4 MILLIGRAM(S): at 07:12

## 2023-01-01 RX ADMIN — HEPARIN SODIUM 5000 UNIT(S): 5000 INJECTION INTRAVENOUS; SUBCUTANEOUS at 13:51

## 2023-01-01 RX ADMIN — Medication 5 MILLIGRAM(S): at 17:54

## 2023-01-01 RX ADMIN — Medication 10 MILLIGRAM(S): at 12:52

## 2023-01-01 RX ADMIN — Medication 300 MILLIGRAM(S): at 13:00

## 2023-01-01 RX ADMIN — Medication 2 TABLET(S): at 12:06

## 2023-01-01 RX ADMIN — MORPHINE 6 MILLIGRAM(S): 10 SOLUTION ORAL at 12:57

## 2023-01-01 RX ADMIN — AMLODIPINE BESYLATE 5 MILLIGRAM(S): 2.5 TABLET ORAL at 05:50

## 2023-01-01 RX ADMIN — ENOXAPARIN SODIUM 100 MILLIGRAM(S): 100 INJECTION SUBCUTANEOUS at 17:41

## 2023-01-01 RX ADMIN — Medication 10 MILLIGRAM(S): at 14:07

## 2023-01-01 RX ADMIN — CHLORHEXIDINE GLUCONATE 1 APPLICATION(S): 213 SOLUTION TOPICAL at 06:23

## 2023-01-01 RX ADMIN — HYDROMORPHONE HYDROCHLORIDE 0.5 MILLIGRAM(S): 2 INJECTION INTRAMUSCULAR; INTRAVENOUS; SUBCUTANEOUS at 03:15

## 2023-01-01 RX ADMIN — Medication 40 MICROGRAM(S): at 22:54

## 2023-01-01 RX ADMIN — Medication 4 MILLIGRAM(S): at 05:05

## 2023-01-01 RX ADMIN — AMIODARONE HYDROCHLORIDE 200 MILLIGRAM(S): 400 TABLET ORAL at 05:34

## 2023-01-01 RX ADMIN — MORPHINE 6 MILLIGRAM(S): 10 SOLUTION ORAL at 12:50

## 2023-01-01 RX ADMIN — Medication 2 TABLET(S): at 10:50

## 2023-01-01 RX ADMIN — Medication 62.5 MILLIMOLE(S): at 12:52

## 2023-01-01 RX ADMIN — HEPARIN SODIUM 5000 UNIT(S): 5000 INJECTION INTRAVENOUS; SUBCUTANEOUS at 22:10

## 2023-01-01 RX ADMIN — Medication 5 MILLIGRAM(S): at 11:19

## 2023-01-01 RX ADMIN — Medication 0.5 MILLIGRAM(S): at 02:15

## 2023-01-01 RX ADMIN — AMIODARONE HYDROCHLORIDE 200 MILLIGRAM(S): 400 TABLET ORAL at 07:13

## 2023-01-01 RX ADMIN — Medication 3 MILLILITER(S): at 17:24

## 2023-01-01 RX ADMIN — Medication 50 MICROGRAM(S): at 05:43

## 2023-01-01 RX ADMIN — HEPARIN SODIUM 5000 UNIT(S): 5000 INJECTION INTRAVENOUS; SUBCUTANEOUS at 05:51

## 2023-01-01 RX ADMIN — I.V. FAT EMULSION 20.83 GM/KG/DAY: 20 EMULSION INTRAVENOUS at 18:15

## 2023-01-01 RX ADMIN — I.V. FAT EMULSION 20.83 GM/KG/DAY: 20 EMULSION INTRAVENOUS at 17:31

## 2023-01-01 RX ADMIN — I.V. FAT EMULSION 20.83 GM/KG/DAY: 20 EMULSION INTRAVENOUS at 18:45

## 2023-01-01 RX ADMIN — CHLORHEXIDINE GLUCONATE 1 APPLICATION(S): 213 SOLUTION TOPICAL at 07:40

## 2023-01-01 RX ADMIN — HEPARIN SODIUM 5000 UNIT(S): 5000 INJECTION INTRAVENOUS; SUBCUTANEOUS at 14:43

## 2023-01-01 RX ADMIN — IMIPENEM AND CILASTATIN 250 MILLIGRAM(S): 250; 250 INJECTION, POWDER, FOR SOLUTION INTRAVENOUS at 09:54

## 2023-01-01 RX ADMIN — Medication 2 TABLET(S): at 18:09

## 2023-01-01 RX ADMIN — HEPARIN SODIUM 5000 UNIT(S): 5000 INJECTION INTRAVENOUS; SUBCUTANEOUS at 06:49

## 2023-01-01 RX ADMIN — Medication 1 EACH: at 17:04

## 2023-01-01 RX ADMIN — Medication 10 MILLIGRAM(S): at 22:30

## 2023-01-01 RX ADMIN — AMIODARONE HYDROCHLORIDE 200 MILLIGRAM(S): 400 TABLET ORAL at 06:30

## 2023-01-01 RX ADMIN — Medication 25 MILLIGRAM(S): at 09:50

## 2023-01-01 RX ADMIN — I.V. FAT EMULSION 20.83 GM/KG/DAY: 20 EMULSION INTRAVENOUS at 19:38

## 2023-01-01 RX ADMIN — HEPARIN SODIUM 5000 UNIT(S): 5000 INJECTION INTRAVENOUS; SUBCUTANEOUS at 06:48

## 2023-01-01 RX ADMIN — HEPARIN SODIUM 5000 UNIT(S): 5000 INJECTION INTRAVENOUS; SUBCUTANEOUS at 22:59

## 2023-01-01 RX ADMIN — Medication 2 TABLET(S): at 11:23

## 2023-01-01 RX ADMIN — Medication 50 MICROGRAM(S): at 06:17

## 2023-01-01 RX ADMIN — ATORVASTATIN CALCIUM 20 MILLIGRAM(S): 80 TABLET, FILM COATED ORAL at 21:44

## 2023-01-01 RX ADMIN — HEPARIN SODIUM 5000 UNIT(S): 5000 INJECTION INTRAVENOUS; SUBCUTANEOUS at 21:28

## 2023-01-01 RX ADMIN — ONDANSETRON 4 MILLIGRAM(S): 8 TABLET, FILM COATED ORAL at 15:00

## 2023-01-01 RX ADMIN — CHLORHEXIDINE GLUCONATE 1 APPLICATION(S): 213 SOLUTION TOPICAL at 06:52

## 2023-01-01 RX ADMIN — HEPARIN SODIUM 5000 UNIT(S): 5000 INJECTION INTRAVENOUS; SUBCUTANEOUS at 05:47

## 2023-01-01 RX ADMIN — Medication 2 TABLET(S): at 10:13

## 2023-01-01 RX ADMIN — HEPARIN SODIUM 5000 UNIT(S): 5000 INJECTION INTRAVENOUS; SUBCUTANEOUS at 14:12

## 2023-01-01 RX ADMIN — Medication 5 MILLIGRAM(S): at 18:00

## 2023-01-01 RX ADMIN — Medication 300 MILLIGRAM(S): at 11:27

## 2023-01-01 RX ADMIN — Medication 300 MILLIGRAM(S): at 10:50

## 2023-01-01 RX ADMIN — Medication 4 MILLIGRAM(S): at 12:01

## 2023-01-01 RX ADMIN — HEPARIN SODIUM 5000 UNIT(S): 5000 INJECTION INTRAVENOUS; SUBCUTANEOUS at 05:44

## 2023-01-01 RX ADMIN — Medication 1 EACH: at 18:32

## 2023-01-01 RX ADMIN — Medication 5 MILLIGRAM(S): at 17:15

## 2023-01-01 RX ADMIN — Medication 5 MILLIGRAM(S): at 16:07

## 2023-01-01 RX ADMIN — PANTOPRAZOLE SODIUM 40 MILLIGRAM(S): 20 TABLET, DELAYED RELEASE ORAL at 09:40

## 2023-01-01 RX ADMIN — CHOLESTYRAMINE 4 GRAM(S): 4 POWDER, FOR SUSPENSION ORAL at 21:28

## 2023-01-01 RX ADMIN — HEPARIN SODIUM 5000 UNIT(S): 5000 INJECTION INTRAVENOUS; SUBCUTANEOUS at 15:43

## 2023-01-01 RX ADMIN — Medication 1 EACH: at 18:48

## 2023-01-01 RX ADMIN — Medication 2 TABLET(S): at 18:13

## 2023-01-01 RX ADMIN — Medication 40 MICROGRAM(S): at 08:50

## 2023-01-01 RX ADMIN — HEPARIN SODIUM 5000 UNIT(S): 5000 INJECTION INTRAVENOUS; SUBCUTANEOUS at 14:08

## 2023-01-01 RX ADMIN — Medication 1 EACH: at 17:26

## 2023-01-01 RX ADMIN — Medication 100 GRAM(S): at 07:03

## 2023-01-01 RX ADMIN — MEROPENEM 100 MILLIGRAM(S): 1 INJECTION INTRAVENOUS at 05:35

## 2023-01-01 RX ADMIN — Medication 2 TABLET(S): at 05:50

## 2023-01-01 RX ADMIN — Medication 50 MILLILITER(S): at 14:18

## 2023-01-01 RX ADMIN — Medication 4 MILLIGRAM(S): at 23:37

## 2023-01-01 RX ADMIN — Medication 250 MILLIGRAM(S): at 15:26

## 2023-01-01 RX ADMIN — Medication 10 MILLIGRAM(S): at 09:27

## 2023-01-01 RX ADMIN — CHLORHEXIDINE GLUCONATE 1 APPLICATION(S): 213 SOLUTION TOPICAL at 05:45

## 2023-01-01 RX ADMIN — Medication 2 TABLET(S): at 05:21

## 2023-01-01 RX ADMIN — I.V. FAT EMULSION 20.83 GM/KG/DAY: 20 EMULSION INTRAVENOUS at 17:01

## 2023-01-01 RX ADMIN — Medication 2 TABLET(S): at 16:33

## 2023-01-01 RX ADMIN — MORPHINE 6 MILLIGRAM(S): 10 SOLUTION ORAL at 23:17

## 2023-01-01 RX ADMIN — Medication 5 MILLIGRAM(S): at 17:27

## 2023-01-01 RX ADMIN — AMLODIPINE BESYLATE 10 MILLIGRAM(S): 2.5 TABLET ORAL at 05:45

## 2023-01-01 RX ADMIN — I.V. FAT EMULSION 41.67 GM/KG/DAY: 20 EMULSION INTRAVENOUS at 17:35

## 2023-01-01 RX ADMIN — HEPARIN SODIUM 5000 UNIT(S): 5000 INJECTION INTRAVENOUS; SUBCUTANEOUS at 16:04

## 2023-01-01 RX ADMIN — I.V. FAT EMULSION 41.67 GM/KG/DAY: 20 EMULSION INTRAVENOUS at 17:20

## 2023-01-01 RX ADMIN — ATORVASTATIN CALCIUM 20 MILLIGRAM(S): 80 TABLET, FILM COATED ORAL at 22:18

## 2023-01-01 RX ADMIN — Medication 1 EACH: at 18:05

## 2023-01-01 RX ADMIN — Medication 4 MILLIGRAM(S): at 05:39

## 2023-01-01 RX ADMIN — Medication 1000 MILLIGRAM(S): at 21:35

## 2023-01-01 RX ADMIN — URSODIOL 300 MILLIGRAM(S): 250 TABLET, FILM COATED ORAL at 13:26

## 2023-01-01 RX ADMIN — HYDROMORPHONE HYDROCHLORIDE 0.5 MILLIGRAM(S): 2 INJECTION INTRAMUSCULAR; INTRAVENOUS; SUBCUTANEOUS at 08:28

## 2023-01-01 RX ADMIN — PROPOFOL 6.06 MICROGRAM(S)/KG/MIN: 10 INJECTION, EMULSION INTRAVENOUS at 13:49

## 2023-01-01 RX ADMIN — CHLORHEXIDINE GLUCONATE 1 APPLICATION(S): 213 SOLUTION TOPICAL at 06:48

## 2023-01-01 RX ADMIN — Medication 1000 MILLIGRAM(S): at 12:29

## 2023-01-01 RX ADMIN — Medication 40 MICROGRAM(S): at 22:36

## 2023-01-01 RX ADMIN — MORPHINE 2 MILLIGRAM(S): 10 SOLUTION ORAL at 12:05

## 2023-01-01 RX ADMIN — HYDROMORPHONE HYDROCHLORIDE 0.5 MILLIGRAM(S): 2 INJECTION INTRAMUSCULAR; INTRAVENOUS; SUBCUTANEOUS at 12:38

## 2023-01-01 RX ADMIN — I.V. FAT EMULSION 20.83 GM/KG/DAY: 20 EMULSION INTRAVENOUS at 17:58

## 2023-01-01 RX ADMIN — NYSTATIN CREAM 1 APPLICATION(S): 100000 CREAM TOPICAL at 06:41

## 2023-01-01 RX ADMIN — HEPARIN SODIUM 5000 UNIT(S): 5000 INJECTION INTRAVENOUS; SUBCUTANEOUS at 21:26

## 2023-01-01 RX ADMIN — Medication 25 MILLIGRAM(S): at 21:48

## 2023-01-01 RX ADMIN — ONDANSETRON 4 MILLIGRAM(S): 8 TABLET, FILM COATED ORAL at 06:39

## 2023-01-01 RX ADMIN — Medication 50 MILLILITER(S): at 04:25

## 2023-01-01 RX ADMIN — HEPARIN SODIUM 5000 UNIT(S): 5000 INJECTION INTRAVENOUS; SUBCUTANEOUS at 06:27

## 2023-01-01 RX ADMIN — Medication 1 EACH: at 17:48

## 2023-01-01 RX ADMIN — SODIUM CHLORIDE 500 MILLILITER(S): 9 INJECTION INTRAMUSCULAR; INTRAVENOUS; SUBCUTANEOUS at 00:40

## 2023-01-01 RX ADMIN — Medication 1 EACH: at 18:25

## 2023-01-01 RX ADMIN — PANTOPRAZOLE SODIUM 40 MILLIGRAM(S): 20 TABLET, DELAYED RELEASE ORAL at 12:01

## 2023-01-01 RX ADMIN — MORPHINE 2 MILLIGRAM(S): 10 SOLUTION ORAL at 00:42

## 2023-01-01 RX ADMIN — Medication 40 MICROGRAM(S): at 06:31

## 2023-01-01 RX ADMIN — Medication 25 MILLIGRAM(S): at 23:02

## 2023-01-01 RX ADMIN — Medication 5 MILLIGRAM(S): at 04:55

## 2023-01-01 RX ADMIN — OXYCODONE HYDROCHLORIDE 5 MILLIGRAM(S): 5 TABLET ORAL at 23:30

## 2023-01-01 RX ADMIN — Medication 10 MILLIGRAM(S): at 17:05

## 2023-01-01 RX ADMIN — Medication 300 MILLIGRAM(S): at 11:22

## 2023-01-01 RX ADMIN — PANTOPRAZOLE SODIUM 40 MILLIGRAM(S): 20 TABLET, DELAYED RELEASE ORAL at 10:07

## 2023-01-01 RX ADMIN — CHOLESTYRAMINE 4 GRAM(S): 4 POWDER, FOR SUSPENSION ORAL at 09:34

## 2023-01-01 RX ADMIN — IMIPENEM AND CILASTATIN 250 MILLIGRAM(S): 250; 250 INJECTION, POWDER, FOR SOLUTION INTRAVENOUS at 11:10

## 2023-01-01 RX ADMIN — Medication 150 MILLIGRAM(S): at 18:40

## 2023-01-01 RX ADMIN — Medication 5 MILLIGRAM(S): at 06:09

## 2023-01-01 RX ADMIN — I.V. FAT EMULSION 20.83 GM/KG/DAY: 20 EMULSION INTRAVENOUS at 17:43

## 2023-01-01 RX ADMIN — OCTREOTIDE ACETATE 100 MICROGRAM(S): 200 INJECTION, SOLUTION INTRAVENOUS; SUBCUTANEOUS at 14:43

## 2023-01-01 RX ADMIN — AMIODARONE HYDROCHLORIDE 200 MILLIGRAM(S): 400 TABLET ORAL at 05:43

## 2023-01-01 RX ADMIN — CHLORHEXIDINE GLUCONATE 1 APPLICATION(S): 213 SOLUTION TOPICAL at 06:31

## 2023-01-01 RX ADMIN — Medication 150 MILLIGRAM(S): at 12:41

## 2023-01-01 RX ADMIN — Medication 4 MILLIGRAM(S): at 06:21

## 2023-01-01 RX ADMIN — Medication 4 MILLIGRAM(S): at 05:49

## 2023-01-01 RX ADMIN — NYSTATIN CREAM 1 APPLICATION(S): 100000 CREAM TOPICAL at 21:55

## 2023-01-01 RX ADMIN — HEPARIN SODIUM 5000 UNIT(S): 5000 INJECTION INTRAVENOUS; SUBCUTANEOUS at 13:09

## 2023-01-01 RX ADMIN — Medication 2 TABLET(S): at 23:14

## 2023-01-01 RX ADMIN — Medication 1 EACH: at 19:23

## 2023-01-01 RX ADMIN — Medication 250 MILLIGRAM(S): at 16:17

## 2023-01-01 RX ADMIN — Medication 100 MILLIEQUIVALENT(S): at 09:36

## 2023-01-01 RX ADMIN — HYDROMORPHONE HYDROCHLORIDE 0.5 MILLIGRAM(S): 2 INJECTION INTRAMUSCULAR; INTRAVENOUS; SUBCUTANEOUS at 09:49

## 2023-01-01 RX ADMIN — Medication 250 MILLIGRAM(S): at 22:09

## 2023-01-01 RX ADMIN — OCTREOTIDE ACETATE 100 MICROGRAM(S): 200 INJECTION, SOLUTION INTRAVENOUS; SUBCUTANEOUS at 22:44

## 2023-01-01 RX ADMIN — HEPARIN SODIUM 5000 UNIT(S): 5000 INJECTION INTRAVENOUS; SUBCUTANEOUS at 21:25

## 2023-01-01 RX ADMIN — IMIPENEM AND CILASTATIN 100 MILLIGRAM(S): 250; 250 INJECTION, POWDER, FOR SOLUTION INTRAVENOUS at 09:29

## 2023-01-01 RX ADMIN — Medication 50 MILLILITER(S): at 17:14

## 2023-01-01 RX ADMIN — Medication 50 MILLILITER(S): at 22:23

## 2023-01-01 RX ADMIN — Medication 4 MILLIGRAM(S): at 19:45

## 2023-01-01 RX ADMIN — HEPARIN SODIUM 5000 UNIT(S): 5000 INJECTION INTRAVENOUS; SUBCUTANEOUS at 14:04

## 2023-01-01 RX ADMIN — Medication 5 MILLIGRAM(S): at 05:12

## 2023-01-01 RX ADMIN — Medication 25 MILLIGRAM(S): at 21:41

## 2023-01-01 RX ADMIN — MORPHINE 6 MILLIGRAM(S): 10 SOLUTION ORAL at 23:54

## 2023-01-01 RX ADMIN — Medication 4 MILLIGRAM(S): at 00:05

## 2023-01-01 RX ADMIN — Medication 1 EACH: at 17:50

## 2023-01-01 RX ADMIN — Medication 10 MILLIGRAM(S): at 17:53

## 2023-01-01 RX ADMIN — HEPARIN SODIUM 5000 UNIT(S): 5000 INJECTION INTRAVENOUS; SUBCUTANEOUS at 21:06

## 2023-01-01 RX ADMIN — IMIPENEM AND CILASTATIN 250 MILLIGRAM(S): 250; 250 INJECTION, POWDER, FOR SOLUTION INTRAVENOUS at 18:14

## 2023-01-01 RX ADMIN — Medication 65 MILLILITER(S): at 21:29

## 2023-01-01 RX ADMIN — HEPARIN SODIUM 5000 UNIT(S): 5000 INJECTION INTRAVENOUS; SUBCUTANEOUS at 21:15

## 2023-01-01 RX ADMIN — HEPARIN SODIUM 5000 UNIT(S): 5000 INJECTION INTRAVENOUS; SUBCUTANEOUS at 21:43

## 2023-01-01 RX ADMIN — URSODIOL 300 MILLIGRAM(S): 250 TABLET, FILM COATED ORAL at 21:34

## 2023-01-01 RX ADMIN — Medication 1 EACH: at 17:03

## 2023-01-01 RX ADMIN — Medication 50 MICROGRAM(S): at 06:11

## 2023-01-01 RX ADMIN — HEPARIN SODIUM 5000 UNIT(S): 5000 INJECTION INTRAVENOUS; SUBCUTANEOUS at 15:37

## 2023-01-01 RX ADMIN — Medication 150 MILLIGRAM(S): at 17:42

## 2023-01-01 RX ADMIN — HEPARIN SODIUM 5000 UNIT(S): 5000 INJECTION INTRAVENOUS; SUBCUTANEOUS at 06:04

## 2023-01-01 RX ADMIN — Medication 4 MILLIGRAM(S): at 06:14

## 2023-01-01 RX ADMIN — HEPARIN SODIUM 5000 UNIT(S): 5000 INJECTION INTRAVENOUS; SUBCUTANEOUS at 06:26

## 2023-01-01 RX ADMIN — Medication 100 MILLIEQUIVALENT(S): at 12:38

## 2023-01-01 RX ADMIN — HEPARIN SODIUM 5000 UNIT(S): 5000 INJECTION INTRAVENOUS; SUBCUTANEOUS at 10:26

## 2023-01-01 RX ADMIN — Medication 10 MILLIGRAM(S): at 05:00

## 2023-01-01 RX ADMIN — Medication 4 MILLIGRAM(S): at 05:53

## 2023-01-01 RX ADMIN — AMIODARONE HYDROCHLORIDE 200 MILLIGRAM(S): 400 TABLET ORAL at 05:26

## 2023-01-01 RX ADMIN — Medication 4 MILLIGRAM(S): at 21:19

## 2023-01-01 RX ADMIN — OCTREOTIDE ACETATE 100 MICROGRAM(S): 200 INJECTION, SOLUTION INTRAVENOUS; SUBCUTANEOUS at 10:18

## 2023-01-01 RX ADMIN — I.V. FAT EMULSION 20.83 GM/KG/DAY: 20 EMULSION INTRAVENOUS at 17:04

## 2023-01-01 RX ADMIN — PANTOPRAZOLE SODIUM 40 MILLIGRAM(S): 20 TABLET, DELAYED RELEASE ORAL at 12:58

## 2023-01-01 RX ADMIN — Medication 1000 MILLIGRAM(S): at 10:06

## 2023-01-01 RX ADMIN — Medication 10 MILLIGRAM(S): at 07:34

## 2023-01-01 RX ADMIN — HEPARIN SODIUM 5000 UNIT(S): 5000 INJECTION INTRAVENOUS; SUBCUTANEOUS at 21:20

## 2023-01-01 RX ADMIN — BENZOCAINE AND MENTHOL 1 LOZENGE: 5; 1 LIQUID ORAL at 07:46

## 2023-01-01 RX ADMIN — CHLORHEXIDINE GLUCONATE 1 APPLICATION(S): 213 SOLUTION TOPICAL at 07:09

## 2023-01-01 RX ADMIN — Medication 10 MILLIGRAM(S): at 23:57

## 2023-01-01 RX ADMIN — Medication 4 MILLIGRAM(S): at 19:22

## 2023-01-01 RX ADMIN — Medication 65 MILLILITER(S): at 16:00

## 2023-01-01 RX ADMIN — Medication 100 MILLIEQUIVALENT(S): at 09:56

## 2023-01-01 RX ADMIN — Medication 1 EACH: at 18:01

## 2023-01-01 RX ADMIN — CHOLESTYRAMINE 4 GRAM(S): 4 POWDER, FOR SUSPENSION ORAL at 09:29

## 2023-01-01 RX ADMIN — NYSTATIN CREAM 1 APPLICATION(S): 100000 CREAM TOPICAL at 15:18

## 2023-01-01 RX ADMIN — HEPARIN SODIUM 5000 UNIT(S): 5000 INJECTION INTRAVENOUS; SUBCUTANEOUS at 05:03

## 2023-01-01 RX ADMIN — NYSTATIN CREAM 1 APPLICATION(S): 100000 CREAM TOPICAL at 07:07

## 2023-01-01 RX ADMIN — HEPARIN SODIUM 5000 UNIT(S): 5000 INJECTION INTRAVENOUS; SUBCUTANEOUS at 06:20

## 2023-01-01 RX ADMIN — OCTREOTIDE ACETATE 100 MICROGRAM(S): 200 INJECTION, SOLUTION INTRAVENOUS; SUBCUTANEOUS at 15:44

## 2023-01-01 RX ADMIN — Medication 25 MILLIGRAM(S): at 18:12

## 2023-01-01 RX ADMIN — Medication 25 MILLIGRAM(S): at 10:34

## 2023-01-01 RX ADMIN — HEPARIN SODIUM 5000 UNIT(S): 5000 INJECTION INTRAVENOUS; SUBCUTANEOUS at 21:44

## 2023-01-01 RX ADMIN — Medication 50 MILLIEQUIVALENT(S): at 11:43

## 2023-01-01 RX ADMIN — Medication 25 MILLIGRAM(S): at 10:30

## 2023-01-01 RX ADMIN — HEPARIN SODIUM 5000 UNIT(S): 5000 INJECTION INTRAVENOUS; SUBCUTANEOUS at 06:32

## 2023-01-01 RX ADMIN — NYSTATIN CREAM 1 APPLICATION(S): 100000 CREAM TOPICAL at 22:21

## 2023-01-01 RX ADMIN — AMIODARONE HYDROCHLORIDE 200 MILLIGRAM(S): 400 TABLET ORAL at 06:21

## 2023-01-01 RX ADMIN — CHOLESTYRAMINE 4 GRAM(S): 4 POWDER, FOR SUSPENSION ORAL at 17:11

## 2023-01-01 RX ADMIN — CHLORHEXIDINE GLUCONATE 1 APPLICATION(S): 213 SOLUTION TOPICAL at 11:51

## 2023-01-01 RX ADMIN — Medication 10 MILLIGRAM(S): at 06:51

## 2023-01-01 RX ADMIN — Medication 2 TABLET(S): at 23:13

## 2023-01-01 RX ADMIN — OCTREOTIDE ACETATE 100 MICROGRAM(S): 200 INJECTION, SOLUTION INTRAVENOUS; SUBCUTANEOUS at 06:49

## 2023-01-01 RX ADMIN — Medication 25 MILLIGRAM(S): at 21:45

## 2023-01-01 RX ADMIN — NYSTATIN CREAM 1 APPLICATION(S): 100000 CREAM TOPICAL at 13:40

## 2023-01-01 RX ADMIN — AMLODIPINE BESYLATE 10 MILLIGRAM(S): 2.5 TABLET ORAL at 05:20

## 2023-01-01 RX ADMIN — Medication 4 MILLIGRAM(S): at 18:13

## 2023-01-01 RX ADMIN — Medication 25 MILLIGRAM(S): at 09:40

## 2023-01-01 RX ADMIN — Medication 50 MICROGRAM(S): at 05:09

## 2023-01-01 RX ADMIN — AMLODIPINE BESYLATE 10 MILLIGRAM(S): 2.5 TABLET ORAL at 05:15

## 2023-01-01 RX ADMIN — IMIPENEM AND CILASTATIN 250 MILLIGRAM(S): 250; 250 INJECTION, POWDER, FOR SOLUTION INTRAVENOUS at 16:00

## 2023-01-01 RX ADMIN — Medication 1 EACH: at 18:03

## 2023-01-01 RX ADMIN — PANTOPRAZOLE SODIUM 40 MILLIGRAM(S): 20 TABLET, DELAYED RELEASE ORAL at 11:23

## 2023-01-01 RX ADMIN — Medication 4 MILLIGRAM(S): at 11:36

## 2023-01-01 RX ADMIN — Medication 10 MILLIGRAM(S): at 12:37

## 2023-01-01 RX ADMIN — SODIUM CHLORIDE 1000 MILLILITER(S): 9 INJECTION, SOLUTION INTRAVENOUS at 11:55

## 2023-01-01 RX ADMIN — Medication 2 TABLET(S): at 23:04

## 2023-01-01 RX ADMIN — MORPHINE 6 MILLIGRAM(S): 10 SOLUTION ORAL at 23:28

## 2023-01-01 RX ADMIN — MORPHINE 6 MILLIGRAM(S): 10 SOLUTION ORAL at 11:01

## 2023-01-01 RX ADMIN — OCTREOTIDE ACETATE 100 MICROGRAM(S): 200 INJECTION, SOLUTION INTRAVENOUS; SUBCUTANEOUS at 17:00

## 2023-01-01 RX ADMIN — CHOLESTYRAMINE 4 GRAM(S): 4 POWDER, FOR SUSPENSION ORAL at 21:50

## 2023-01-01 RX ADMIN — CHOLESTYRAMINE 4 GRAM(S): 4 POWDER, FOR SUSPENSION ORAL at 16:19

## 2023-01-01 RX ADMIN — HEPARIN SODIUM 5000 UNIT(S): 5000 INJECTION INTRAVENOUS; SUBCUTANEOUS at 06:21

## 2023-01-01 RX ADMIN — Medication 50 MILLILITER(S): at 06:16

## 2023-01-01 RX ADMIN — CHLORHEXIDINE GLUCONATE 15 MILLILITER(S): 213 SOLUTION TOPICAL at 06:40

## 2023-01-01 RX ADMIN — Medication 50 MILLILITER(S): at 16:50

## 2023-01-01 RX ADMIN — Medication 10 MILLIGRAM(S): at 17:49

## 2023-01-01 RX ADMIN — SODIUM CHLORIDE 1000 MILLILITER(S): 9 INJECTION INTRAMUSCULAR; INTRAVENOUS; SUBCUTANEOUS at 17:35

## 2023-01-01 RX ADMIN — Medication 10 MILLIGRAM(S): at 08:36

## 2023-01-01 RX ADMIN — NYSTATIN CREAM 1 APPLICATION(S): 100000 CREAM TOPICAL at 06:33

## 2023-01-01 RX ADMIN — IMIPENEM AND CILASTATIN 250 MILLIGRAM(S): 250; 250 INJECTION, POWDER, FOR SOLUTION INTRAVENOUS at 05:37

## 2023-01-01 RX ADMIN — ENOXAPARIN SODIUM 100 MILLIGRAM(S): 100 INJECTION SUBCUTANEOUS at 18:13

## 2023-01-01 RX ADMIN — HEPARIN SODIUM 5000 UNIT(S): 5000 INJECTION INTRAVENOUS; SUBCUTANEOUS at 05:39

## 2023-01-01 RX ADMIN — HEPARIN SODIUM 5000 UNIT(S): 5000 INJECTION INTRAVENOUS; SUBCUTANEOUS at 05:52

## 2023-01-01 RX ADMIN — Medication 2 TABLET(S): at 11:13

## 2023-01-01 RX ADMIN — CHOLESTYRAMINE 4 GRAM(S): 4 POWDER, FOR SUSPENSION ORAL at 21:49

## 2023-01-01 RX ADMIN — NYSTATIN CREAM 1 APPLICATION(S): 100000 CREAM TOPICAL at 21:21

## 2023-01-01 RX ADMIN — OCTREOTIDE ACETATE 100 MICROGRAM(S): 200 INJECTION, SOLUTION INTRAVENOUS; SUBCUTANEOUS at 00:39

## 2023-01-01 RX ADMIN — I.V. FAT EMULSION 40.8 GM/KG/DAY: 20 EMULSION INTRAVENOUS at 17:08

## 2023-01-01 RX ADMIN — Medication 5 MILLIGRAM(S): at 21:37

## 2023-01-01 RX ADMIN — Medication 2 TABLET(S): at 12:08

## 2023-01-01 RX ADMIN — I.V. FAT EMULSION 20.83 GM/KG/DAY: 20 EMULSION INTRAVENOUS at 18:32

## 2023-01-01 RX ADMIN — NYSTATIN CREAM 1 APPLICATION(S): 100000 CREAM TOPICAL at 06:17

## 2023-01-01 RX ADMIN — Medication 50 MICROGRAM(S): at 05:59

## 2023-01-01 RX ADMIN — CHLORHEXIDINE GLUCONATE 1 APPLICATION(S): 213 SOLUTION TOPICAL at 05:15

## 2023-01-01 RX ADMIN — Medication 25 MILLIGRAM(S): at 23:21

## 2023-01-01 RX ADMIN — CHOLESTYRAMINE 4 GRAM(S): 4 POWDER, FOR SUSPENSION ORAL at 17:21

## 2023-01-01 RX ADMIN — Medication 1 EACH: at 18:15

## 2023-01-01 RX ADMIN — NYSTATIN CREAM 1 APPLICATION(S): 100000 CREAM TOPICAL at 14:26

## 2023-01-01 RX ADMIN — ERGOCALCIFEROL 50000 UNIT(S): 1.25 CAPSULE ORAL at 15:54

## 2023-01-01 RX ADMIN — PANTOPRAZOLE SODIUM 40 MILLIGRAM(S): 20 TABLET, DELAYED RELEASE ORAL at 10:18

## 2023-01-01 RX ADMIN — INSULIN HUMAN 10 UNIT(S): 100 INJECTION, SOLUTION SUBCUTANEOUS at 20:30

## 2023-01-01 RX ADMIN — HEPARIN SODIUM 5000 UNIT(S): 5000 INJECTION INTRAVENOUS; SUBCUTANEOUS at 22:23

## 2023-01-01 RX ADMIN — OCTREOTIDE ACETATE 100 MICROGRAM(S): 200 INJECTION, SOLUTION INTRAVENOUS; SUBCUTANEOUS at 07:17

## 2023-01-01 RX ADMIN — Medication 4 MILLIGRAM(S): at 06:28

## 2023-01-01 RX ADMIN — Medication 2 TABLET(S): at 17:22

## 2023-01-01 RX ADMIN — HEPARIN SODIUM 5000 UNIT(S): 5000 INJECTION INTRAVENOUS; SUBCUTANEOUS at 13:22

## 2023-01-01 RX ADMIN — Medication 40 MICROGRAM(S): at 06:08

## 2023-01-01 RX ADMIN — NYSTATIN CREAM 1 APPLICATION(S): 100000 CREAM TOPICAL at 21:13

## 2023-01-01 RX ADMIN — Medication 100 MILLIGRAM(S): at 22:16

## 2023-01-01 RX ADMIN — MORPHINE 6 MILLIGRAM(S): 10 SOLUTION ORAL at 17:16

## 2023-01-01 RX ADMIN — MORPHINE 6 MILLIGRAM(S): 10 SOLUTION ORAL at 06:17

## 2023-01-01 RX ADMIN — MORPHINE 6 MILLIGRAM(S): 10 SOLUTION ORAL at 11:04

## 2023-01-01 RX ADMIN — Medication 3 MILLILITER(S): at 23:55

## 2023-01-01 RX ADMIN — ERYTHROPOIETIN 10000 UNIT(S): 10000 INJECTION, SOLUTION INTRAVENOUS; SUBCUTANEOUS at 16:59

## 2023-01-01 RX ADMIN — NYSTATIN CREAM 1 APPLICATION(S): 100000 CREAM TOPICAL at 07:03

## 2023-01-01 RX ADMIN — CHOLESTYRAMINE 4 GRAM(S): 4 POWDER, FOR SUSPENSION ORAL at 10:09

## 2023-01-01 RX ADMIN — Medication 10 MILLIGRAM(S): at 23:08

## 2023-01-01 RX ADMIN — DIATRIZOATE MEGLUMINE 30 MILLILITER(S): 180 INJECTION, SOLUTION INTRAVESICAL at 10:10

## 2023-01-01 RX ADMIN — PANTOPRAZOLE SODIUM 40 MILLIGRAM(S): 20 TABLET, DELAYED RELEASE ORAL at 11:38

## 2023-01-01 RX ADMIN — CHOLESTYRAMINE 4 GRAM(S): 4 POWDER, FOR SUSPENSION ORAL at 11:22

## 2023-01-01 RX ADMIN — Medication 25 MILLIGRAM(S): at 21:38

## 2023-01-01 RX ADMIN — PROPOFOL 6.06 MICROGRAM(S)/KG/MIN: 10 INJECTION, EMULSION INTRAVENOUS at 19:26

## 2023-01-01 RX ADMIN — Medication 4 MILLIGRAM(S): at 06:29

## 2023-01-01 RX ADMIN — CHLORHEXIDINE GLUCONATE 1 APPLICATION(S): 213 SOLUTION TOPICAL at 05:42

## 2023-01-01 RX ADMIN — I.V. FAT EMULSION 41.67 GM/KG/DAY: 20 EMULSION INTRAVENOUS at 18:31

## 2023-01-01 RX ADMIN — Medication 25 MILLIGRAM(S): at 10:12

## 2023-01-01 RX ADMIN — CHLORHEXIDINE GLUCONATE 15 MILLILITER(S): 213 SOLUTION TOPICAL at 17:15

## 2023-01-01 RX ADMIN — Medication 4 MILLIGRAM(S): at 18:28

## 2023-01-01 RX ADMIN — CHLORHEXIDINE GLUCONATE 1 APPLICATION(S): 213 SOLUTION TOPICAL at 07:02

## 2023-01-01 RX ADMIN — MORPHINE 6 MILLIGRAM(S): 10 SOLUTION ORAL at 12:00

## 2023-01-01 RX ADMIN — NYSTATIN CREAM 1 APPLICATION(S): 100000 CREAM TOPICAL at 21:43

## 2023-01-01 RX ADMIN — Medication 10 MILLIGRAM(S): at 10:22

## 2023-01-01 RX ADMIN — HEPARIN SODIUM 5000 UNIT(S): 5000 INJECTION INTRAVENOUS; SUBCUTANEOUS at 13:41

## 2023-01-01 RX ADMIN — CEFTRIAXONE 100 MILLIGRAM(S): 500 INJECTION, POWDER, FOR SOLUTION INTRAMUSCULAR; INTRAVENOUS at 20:45

## 2023-01-01 RX ADMIN — LOSARTAN POTASSIUM 25 MILLIGRAM(S): 100 TABLET, FILM COATED ORAL at 06:12

## 2023-01-01 RX ADMIN — SODIUM CHLORIDE 500 MILLILITER(S): 9 INJECTION INTRAMUSCULAR; INTRAVENOUS; SUBCUTANEOUS at 06:47

## 2023-01-01 RX ADMIN — HEPARIN SODIUM 5000 UNIT(S): 5000 INJECTION INTRAVENOUS; SUBCUTANEOUS at 05:50

## 2023-01-01 RX ADMIN — CEFTRIAXONE 100 MILLIGRAM(S): 500 INJECTION, POWDER, FOR SOLUTION INTRAMUSCULAR; INTRAVENOUS at 21:05

## 2023-01-01 RX ADMIN — QUETIAPINE FUMARATE 25 MILLIGRAM(S): 200 TABLET, FILM COATED ORAL at 22:04

## 2023-01-01 RX ADMIN — Medication 2 TABLET(S): at 06:02

## 2023-01-01 RX ADMIN — Medication 4 MILLIGRAM(S): at 17:25

## 2023-01-01 RX ADMIN — Medication 4 MILLIGRAM(S): at 07:07

## 2023-01-01 RX ADMIN — Medication 40 MILLIGRAM(S): at 17:00

## 2023-01-01 RX ADMIN — Medication 5 MILLIGRAM(S): at 06:00

## 2023-01-01 RX ADMIN — Medication 100 GRAM(S): at 05:13

## 2023-01-01 RX ADMIN — Medication 4 MILLIGRAM(S): at 00:02

## 2023-01-01 RX ADMIN — Medication 4 MILLIGRAM(S): at 13:05

## 2023-01-01 RX ADMIN — SODIUM CHLORIDE 1000 MILLILITER(S): 9 INJECTION, SOLUTION INTRAVENOUS at 09:16

## 2023-01-01 RX ADMIN — Medication 2 TABLET(S): at 23:07

## 2023-01-01 RX ADMIN — Medication 0.5 MILLIGRAM(S): at 23:30

## 2023-01-01 RX ADMIN — HYDROMORPHONE HYDROCHLORIDE 0.5 MILLIGRAM(S): 2 INJECTION INTRAMUSCULAR; INTRAVENOUS; SUBCUTANEOUS at 10:10

## 2023-01-01 RX ADMIN — OCTREOTIDE ACETATE 100 MICROGRAM(S): 200 INJECTION, SOLUTION INTRAVENOUS; SUBCUTANEOUS at 17:30

## 2023-01-01 RX ADMIN — CHOLESTYRAMINE 4 GRAM(S): 4 POWDER, FOR SUSPENSION ORAL at 08:24

## 2023-01-01 RX ADMIN — MORPHINE 6 MILLIGRAM(S): 10 SOLUTION ORAL at 06:52

## 2023-01-01 RX ADMIN — Medication 150 MILLIGRAM(S): at 11:47

## 2023-01-01 RX ADMIN — HEPARIN SODIUM 5000 UNIT(S): 5000 INJECTION INTRAVENOUS; SUBCUTANEOUS at 14:59

## 2023-01-01 RX ADMIN — Medication 100 MILLIGRAM(S): at 10:03

## 2023-01-01 RX ADMIN — NYSTATIN CREAM 1 APPLICATION(S): 100000 CREAM TOPICAL at 06:12

## 2023-01-01 RX ADMIN — Medication 2 TABLET(S): at 06:44

## 2023-01-01 RX ADMIN — FLUCONAZOLE 100 MILLIGRAM(S): 150 TABLET ORAL at 11:13

## 2023-01-01 RX ADMIN — Medication 10 MILLIGRAM(S): at 05:09

## 2023-01-01 RX ADMIN — Medication 1 EACH: at 18:21

## 2023-01-01 RX ADMIN — Medication 4 MILLIGRAM(S): at 05:52

## 2023-01-01 RX ADMIN — Medication 5 MILLIGRAM(S): at 21:45

## 2023-01-01 RX ADMIN — Medication 0.5 MILLIGRAM(S): at 19:25

## 2023-01-01 RX ADMIN — ATORVASTATIN CALCIUM 20 MILLIGRAM(S): 80 TABLET, FILM COATED ORAL at 21:48

## 2023-01-01 RX ADMIN — HEPARIN SODIUM 5000 UNIT(S): 5000 INJECTION INTRAVENOUS; SUBCUTANEOUS at 15:00

## 2023-01-01 RX ADMIN — Medication 5 MILLIGRAM(S): at 12:00

## 2023-01-01 RX ADMIN — CHOLESTYRAMINE 4 GRAM(S): 4 POWDER, FOR SUSPENSION ORAL at 15:31

## 2023-01-01 RX ADMIN — NICARDIPINE HYDROCHLORIDE 25 MG/HR: 30 CAPSULE, EXTENDED RELEASE ORAL at 07:51

## 2023-01-01 RX ADMIN — URSODIOL 300 MILLIGRAM(S): 250 TABLET, FILM COATED ORAL at 22:51

## 2023-01-01 RX ADMIN — Medication 300 MILLIGRAM(S): at 10:35

## 2023-01-01 RX ADMIN — Medication 50 MICROGRAM(S): at 05:34

## 2023-01-01 RX ADMIN — Medication 15 MILLIGRAM(S): at 23:56

## 2023-01-01 RX ADMIN — Medication 25 MILLIGRAM(S): at 21:59

## 2023-01-01 RX ADMIN — Medication 4 MILLIGRAM(S): at 18:21

## 2023-01-01 RX ADMIN — Medication 65 MILLILITER(S): at 17:00

## 2023-01-01 RX ADMIN — Medication 2 TABLET(S): at 10:17

## 2023-01-01 RX ADMIN — OCTREOTIDE ACETATE 100 MICROGRAM(S): 200 INJECTION, SOLUTION INTRAVENOUS; SUBCUTANEOUS at 09:37

## 2023-01-01 RX ADMIN — MORPHINE 2 MILLIGRAM(S): 10 SOLUTION ORAL at 18:03

## 2023-01-01 RX ADMIN — HYDROMORPHONE HYDROCHLORIDE 0.5 MILLIGRAM(S): 2 INJECTION INTRAMUSCULAR; INTRAVENOUS; SUBCUTANEOUS at 13:20

## 2023-01-01 RX ADMIN — AMIODARONE HYDROCHLORIDE 200 MILLIGRAM(S): 400 TABLET ORAL at 06:18

## 2023-01-01 RX ADMIN — Medication 4 MILLIGRAM(S): at 06:01

## 2023-01-01 RX ADMIN — HEPARIN SODIUM 5000 UNIT(S): 5000 INJECTION INTRAVENOUS; SUBCUTANEOUS at 05:18

## 2023-01-01 RX ADMIN — HEPARIN SODIUM 5000 UNIT(S): 5000 INJECTION INTRAVENOUS; SUBCUTANEOUS at 23:14

## 2023-01-01 RX ADMIN — I.V. FAT EMULSION 20.83 GM/KG/DAY: 20 EMULSION INTRAVENOUS at 17:02

## 2023-01-01 RX ADMIN — Medication 4 MILLIGRAM(S): at 18:34

## 2023-01-01 RX ADMIN — ENOXAPARIN SODIUM 100 MILLIGRAM(S): 100 INJECTION SUBCUTANEOUS at 05:37

## 2023-01-01 RX ADMIN — Medication 150 MILLIGRAM(S): at 11:58

## 2023-01-01 RX ADMIN — IRON SUCROSE 110 MILLIGRAM(S): 20 INJECTION, SOLUTION INTRAVENOUS at 10:26

## 2023-01-01 RX ADMIN — BENZOCAINE AND MENTHOL 1 LOZENGE: 5; 1 LIQUID ORAL at 01:54

## 2023-01-01 RX ADMIN — Medication 5 MILLIGRAM(S): at 11:04

## 2023-01-01 RX ADMIN — AMLODIPINE BESYLATE 5 MILLIGRAM(S): 2.5 TABLET ORAL at 17:59

## 2023-01-01 RX ADMIN — MORPHINE 6 MILLIGRAM(S): 10 SOLUTION ORAL at 11:22

## 2023-01-01 RX ADMIN — I.V. FAT EMULSION 20.83 GM/KG/DAY: 20 EMULSION INTRAVENOUS at 21:01

## 2023-01-01 RX ADMIN — Medication 4 MILLIGRAM(S): at 11:19

## 2023-01-01 RX ADMIN — Medication 1 EACH: at 18:28

## 2023-01-01 RX ADMIN — CHLORHEXIDINE GLUCONATE 1 APPLICATION(S): 213 SOLUTION TOPICAL at 06:55

## 2023-01-01 RX ADMIN — Medication 150 MILLIGRAM(S): at 17:38

## 2023-01-01 RX ADMIN — Medication 40 MILLIGRAM(S): at 03:16

## 2023-01-01 RX ADMIN — CHLORHEXIDINE GLUCONATE 1 APPLICATION(S): 213 SOLUTION TOPICAL at 05:33

## 2023-01-01 RX ADMIN — CHLORHEXIDINE GLUCONATE 15 MILLILITER(S): 213 SOLUTION TOPICAL at 17:10

## 2023-01-01 RX ADMIN — Medication 5 MILLIGRAM(S): at 22:06

## 2023-01-01 RX ADMIN — Medication 10 MILLIGRAM(S): at 16:53

## 2023-01-01 RX ADMIN — I.V. FAT EMULSION 20.83 GM/KG/DAY: 20 EMULSION INTRAVENOUS at 05:00

## 2023-01-01 RX ADMIN — Medication 100 MILLIGRAM(S): at 05:35

## 2023-01-01 RX ADMIN — CHLORHEXIDINE GLUCONATE 15 MILLILITER(S): 213 SOLUTION TOPICAL at 18:48

## 2023-01-01 RX ADMIN — Medication 5 MILLIGRAM(S): at 06:56

## 2023-01-01 RX ADMIN — URSODIOL 300 MILLIGRAM(S): 250 TABLET, FILM COATED ORAL at 05:33

## 2023-01-01 RX ADMIN — AMIODARONE HYDROCHLORIDE 200 MILLIGRAM(S): 400 TABLET ORAL at 07:56

## 2023-01-01 RX ADMIN — Medication 65 MILLILITER(S): at 02:00

## 2023-01-01 RX ADMIN — Medication 10 MILLIGRAM(S): at 19:18

## 2023-01-01 RX ADMIN — Medication 20 MILLIGRAM(S): at 16:30

## 2023-01-01 RX ADMIN — Medication 0.5 MILLIGRAM(S): at 01:34

## 2023-01-01 RX ADMIN — Medication 4 MILLIGRAM(S): at 18:00

## 2023-01-01 RX ADMIN — Medication 150 MILLIGRAM(S): at 18:03

## 2023-01-01 RX ADMIN — HEPARIN SODIUM 5000 UNIT(S): 5000 INJECTION INTRAVENOUS; SUBCUTANEOUS at 21:23

## 2023-01-01 RX ADMIN — Medication 5 MILLIGRAM(S): at 11:35

## 2023-01-01 RX ADMIN — Medication 40 MILLIGRAM(S): at 09:24

## 2023-01-01 RX ADMIN — URSODIOL 300 MILLIGRAM(S): 250 TABLET, FILM COATED ORAL at 06:14

## 2023-01-01 RX ADMIN — MORPHINE 6 MILLIGRAM(S): 10 SOLUTION ORAL at 23:10

## 2023-01-01 RX ADMIN — IMIPENEM AND CILASTATIN 250 MILLIGRAM(S): 250; 250 INJECTION, POWDER, FOR SOLUTION INTRAVENOUS at 00:27

## 2023-01-01 RX ADMIN — CHOLESTYRAMINE 4 GRAM(S): 4 POWDER, FOR SUSPENSION ORAL at 21:40

## 2023-01-01 RX ADMIN — CHOLESTYRAMINE 4 GRAM(S): 4 POWDER, FOR SUSPENSION ORAL at 09:04

## 2023-01-01 RX ADMIN — HEPARIN SODIUM 5000 UNIT(S): 5000 INJECTION INTRAVENOUS; SUBCUTANEOUS at 07:57

## 2023-01-01 RX ADMIN — HEPARIN SODIUM 5000 UNIT(S): 5000 INJECTION INTRAVENOUS; SUBCUTANEOUS at 15:26

## 2023-01-01 RX ADMIN — IMIPENEM AND CILASTATIN 250 MILLIGRAM(S): 250; 250 INJECTION, POWDER, FOR SOLUTION INTRAVENOUS at 18:49

## 2023-01-01 RX ADMIN — CHOLESTYRAMINE 4 GRAM(S): 4 POWDER, FOR SUSPENSION ORAL at 13:01

## 2023-01-01 RX ADMIN — NYSTATIN CREAM 1 APPLICATION(S): 100000 CREAM TOPICAL at 14:28

## 2023-01-01 RX ADMIN — Medication 5 MILLIGRAM(S): at 05:30

## 2023-01-01 RX ADMIN — Medication 100 MILLIEQUIVALENT(S): at 11:59

## 2023-01-01 RX ADMIN — Medication 50 MICROGRAM(S): at 06:31

## 2023-01-01 RX ADMIN — Medication 5 MILLIGRAM(S): at 11:21

## 2023-01-01 RX ADMIN — BENZOCAINE AND MENTHOL 1 LOZENGE: 5; 1 LIQUID ORAL at 21:53

## 2023-01-01 RX ADMIN — PANTOPRAZOLE SODIUM 40 MILLIGRAM(S): 20 TABLET, DELAYED RELEASE ORAL at 06:14

## 2023-01-01 RX ADMIN — Medication 100 GRAM(S): at 09:32

## 2023-01-01 RX ADMIN — Medication 75 MILLILITER(S): at 03:00

## 2023-01-01 RX ADMIN — Medication 4 MILLIGRAM(S): at 11:17

## 2023-01-01 RX ADMIN — Medication 25 MILLIGRAM(S): at 18:42

## 2023-01-01 RX ADMIN — Medication 10 MILLIGRAM(S): at 23:23

## 2023-01-01 RX ADMIN — CHLORHEXIDINE GLUCONATE 1 APPLICATION(S): 213 SOLUTION TOPICAL at 06:12

## 2023-01-01 RX ADMIN — Medication 125 MILLILITER(S): at 16:33

## 2023-01-01 RX ADMIN — CHLORHEXIDINE GLUCONATE 1 APPLICATION(S): 213 SOLUTION TOPICAL at 07:00

## 2023-01-01 RX ADMIN — SODIUM CHLORIDE 1000 MILLILITER(S): 9 INJECTION, SOLUTION INTRAVENOUS at 00:37

## 2023-01-01 RX ADMIN — HEPARIN SODIUM 5000 UNIT(S): 5000 INJECTION INTRAVENOUS; SUBCUTANEOUS at 13:52

## 2023-01-01 RX ADMIN — Medication 5 MILLIGRAM(S): at 11:58

## 2023-01-01 RX ADMIN — Medication 2 TABLET(S): at 12:01

## 2023-01-01 RX ADMIN — Medication 2 TABLET(S): at 23:08

## 2023-01-01 RX ADMIN — Medication 5 MILLIGRAM(S): at 17:37

## 2023-01-01 RX ADMIN — Medication 30 MICROGRAM(S): at 22:57

## 2023-01-01 RX ADMIN — HEPARIN SODIUM 5000 UNIT(S): 5000 INJECTION INTRAVENOUS; SUBCUTANEOUS at 21:58

## 2023-01-01 RX ADMIN — HEPARIN SODIUM 5000 UNIT(S): 5000 INJECTION INTRAVENOUS; SUBCUTANEOUS at 13:39

## 2023-01-01 RX ADMIN — HEPARIN SODIUM 5000 UNIT(S): 5000 INJECTION INTRAVENOUS; SUBCUTANEOUS at 22:45

## 2023-01-01 RX ADMIN — Medication 4 MILLIGRAM(S): at 12:03

## 2023-01-01 RX ADMIN — CEFEPIME 100 MILLIGRAM(S): 1 INJECTION, POWDER, FOR SOLUTION INTRAMUSCULAR; INTRAVENOUS at 11:17

## 2023-01-01 RX ADMIN — ATORVASTATIN CALCIUM 20 MILLIGRAM(S): 80 TABLET, FILM COATED ORAL at 22:00

## 2023-01-01 RX ADMIN — OCTREOTIDE ACETATE 100 MICROGRAM(S): 200 INJECTION, SOLUTION INTRAVENOUS; SUBCUTANEOUS at 02:34

## 2023-01-01 RX ADMIN — MORPHINE 2 MILLIGRAM(S): 10 SOLUTION ORAL at 23:47

## 2023-01-01 RX ADMIN — Medication 125 MILLILITER(S): at 11:18

## 2023-01-01 RX ADMIN — Medication 4 MILLIGRAM(S): at 23:21

## 2023-01-01 RX ADMIN — OCTREOTIDE ACETATE 100 MICROGRAM(S): 200 INJECTION, SOLUTION INTRAVENOUS; SUBCUTANEOUS at 06:43

## 2023-01-01 RX ADMIN — Medication 2 TABLET(S): at 11:48

## 2023-01-01 RX ADMIN — FENTANYL CITRATE 5.67 MICROGRAM(S)/KG/HR: 50 INJECTION INTRAVENOUS at 05:17

## 2023-01-01 RX ADMIN — Medication 100 GRAM(S): at 07:13

## 2023-01-01 RX ADMIN — HEPARIN SODIUM 5000 UNIT(S): 5000 INJECTION INTRAVENOUS; SUBCUTANEOUS at 21:31

## 2023-01-01 RX ADMIN — CHLORHEXIDINE GLUCONATE 15 MILLILITER(S): 213 SOLUTION TOPICAL at 18:13

## 2023-01-01 RX ADMIN — Medication 5 MILLIGRAM(S): at 17:58

## 2023-01-01 RX ADMIN — Medication 5 MILLIGRAM(S): at 15:19

## 2023-01-01 RX ADMIN — Medication 10 MILLIGRAM(S): at 22:17

## 2023-01-01 RX ADMIN — Medication 25 MILLIGRAM(S): at 06:34

## 2023-01-01 RX ADMIN — HYDROMORPHONE HYDROCHLORIDE 0.5 MILLIGRAM(S): 2 INJECTION INTRAMUSCULAR; INTRAVENOUS; SUBCUTANEOUS at 19:54

## 2023-01-01 RX ADMIN — Medication 25 MILLIGRAM(S): at 23:39

## 2023-01-01 RX ADMIN — Medication 300 MILLIGRAM(S): at 12:31

## 2023-01-01 RX ADMIN — CHOLESTYRAMINE 4 GRAM(S): 4 POWDER, FOR SUSPENSION ORAL at 09:36

## 2023-01-01 RX ADMIN — SODIUM CHLORIDE 120 MILLILITER(S): 9 INJECTION INTRAMUSCULAR; INTRAVENOUS; SUBCUTANEOUS at 17:19

## 2023-01-01 RX ADMIN — Medication 4 MILLIGRAM(S): at 11:57

## 2023-01-01 RX ADMIN — Medication 1 EACH: at 18:59

## 2023-01-01 RX ADMIN — Medication 4 MILLIGRAM(S): at 06:18

## 2023-01-01 RX ADMIN — Medication 4 MILLIGRAM(S): at 23:43

## 2023-01-01 RX ADMIN — Medication 4 MILLIGRAM(S): at 23:06

## 2023-01-01 RX ADMIN — AMIODARONE HYDROCHLORIDE 16.7 MG/MIN: 400 TABLET ORAL at 08:32

## 2023-01-01 RX ADMIN — OCTREOTIDE ACETATE 100 MICROGRAM(S): 200 INJECTION, SOLUTION INTRAVENOUS; SUBCUTANEOUS at 00:24

## 2023-01-01 RX ADMIN — CEFEPIME 100 MILLIGRAM(S): 1 INJECTION, POWDER, FOR SOLUTION INTRAMUSCULAR; INTRAVENOUS at 16:38

## 2023-01-01 RX ADMIN — Medication 0.25 MILLIGRAM(S): at 00:20

## 2023-01-01 RX ADMIN — Medication 5 MILLIGRAM(S): at 11:45

## 2023-01-01 RX ADMIN — Medication 25 MILLIGRAM(S): at 10:03

## 2023-01-01 RX ADMIN — Medication 5 MILLIGRAM(S): at 19:21

## 2023-01-01 RX ADMIN — Medication 5 MILLIGRAM(S): at 11:50

## 2023-01-01 RX ADMIN — MORPHINE 6 MILLIGRAM(S): 10 SOLUTION ORAL at 17:35

## 2023-01-01 RX ADMIN — Medication 65 MILLILITER(S): at 00:00

## 2023-01-01 RX ADMIN — OCTREOTIDE ACETATE 100 MICROGRAM(S): 200 INJECTION, SOLUTION INTRAVENOUS; SUBCUTANEOUS at 17:36

## 2023-01-01 RX ADMIN — Medication 2 TABLET(S): at 18:00

## 2023-01-01 RX ADMIN — Medication 100 MILLIGRAM(S): at 21:59

## 2023-01-01 RX ADMIN — HEPARIN SODIUM 5000 UNIT(S): 5000 INJECTION INTRAVENOUS; SUBCUTANEOUS at 22:22

## 2023-01-01 RX ADMIN — CHOLESTYRAMINE 4 GRAM(S): 4 POWDER, FOR SUSPENSION ORAL at 21:23

## 2023-01-01 RX ADMIN — Medication 10 MILLIGRAM(S): at 17:06

## 2023-01-01 RX ADMIN — HEPARIN SODIUM 5000 UNIT(S): 5000 INJECTION INTRAVENOUS; SUBCUTANEOUS at 14:54

## 2023-01-01 RX ADMIN — Medication 2 TABLET(S): at 17:44

## 2023-01-01 RX ADMIN — Medication 30 MICROGRAM(S): at 21:40

## 2023-01-01 RX ADMIN — Medication 5 MILLIGRAM(S): at 06:21

## 2023-01-01 RX ADMIN — Medication 1 EACH: at 17:34

## 2023-01-01 RX ADMIN — Medication 1 EACH: at 18:00

## 2023-01-01 RX ADMIN — Medication 4 MILLIGRAM(S): at 18:47

## 2023-01-01 RX ADMIN — URSODIOL 300 MILLIGRAM(S): 250 TABLET, FILM COATED ORAL at 12:19

## 2023-01-01 RX ADMIN — MORPHINE 6 MILLIGRAM(S): 10 SOLUTION ORAL at 23:37

## 2023-01-01 RX ADMIN — Medication 50 MICROGRAM(S): at 06:13

## 2023-01-01 RX ADMIN — Medication 5 MILLIGRAM(S): at 18:32

## 2023-01-01 RX ADMIN — CEFEPIME 100 MILLIGRAM(S): 1 INJECTION, POWDER, FOR SOLUTION INTRAMUSCULAR; INTRAVENOUS at 01:01

## 2023-01-01 RX ADMIN — Medication 400 MILLIGRAM(S): at 21:04

## 2023-01-01 RX ADMIN — Medication 4 MILLIGRAM(S): at 06:16

## 2023-01-01 RX ADMIN — HEPARIN SODIUM 5000 UNIT(S): 5000 INJECTION INTRAVENOUS; SUBCUTANEOUS at 21:34

## 2023-01-01 RX ADMIN — Medication 4 MILLIGRAM(S): at 06:58

## 2023-01-01 RX ADMIN — HEPARIN SODIUM 5000 UNIT(S): 5000 INJECTION INTRAVENOUS; SUBCUTANEOUS at 22:58

## 2023-01-01 RX ADMIN — Medication 25 MILLIGRAM(S): at 21:58

## 2023-01-01 RX ADMIN — ERGOCALCIFEROL 50000 UNIT(S): 1.25 CAPSULE ORAL at 12:43

## 2023-01-01 RX ADMIN — Medication 4 MILLIGRAM(S): at 11:20

## 2023-01-01 RX ADMIN — Medication 5 MILLIGRAM(S): at 11:34

## 2023-01-01 RX ADMIN — Medication 50 MILLILITER(S): at 19:02

## 2023-01-01 RX ADMIN — AMLODIPINE BESYLATE 5 MILLIGRAM(S): 2.5 TABLET ORAL at 05:14

## 2023-01-01 RX ADMIN — CHLORHEXIDINE GLUCONATE 1 APPLICATION(S): 213 SOLUTION TOPICAL at 06:45

## 2023-01-01 RX ADMIN — URSODIOL 300 MILLIGRAM(S): 250 TABLET, FILM COATED ORAL at 13:00

## 2023-01-01 RX ADMIN — HEPARIN SODIUM 5000 UNIT(S): 5000 INJECTION INTRAVENOUS; SUBCUTANEOUS at 06:23

## 2023-01-01 RX ADMIN — HEPARIN SODIUM 5000 UNIT(S): 5000 INJECTION INTRAVENOUS; SUBCUTANEOUS at 20:52

## 2023-01-01 RX ADMIN — Medication 5 MILLIGRAM(S): at 22:35

## 2023-01-01 RX ADMIN — Medication 5 MILLIGRAM(S): at 21:54

## 2023-01-01 RX ADMIN — MORPHINE 6 MILLIGRAM(S): 10 SOLUTION ORAL at 18:43

## 2023-01-01 RX ADMIN — Medication 100 MILLIGRAM(S): at 07:40

## 2023-01-01 RX ADMIN — Medication 2 TABLET(S): at 12:45

## 2023-01-01 RX ADMIN — Medication 65 MILLILITER(S): at 07:13

## 2023-01-01 RX ADMIN — AMLODIPINE BESYLATE 5 MILLIGRAM(S): 2.5 TABLET ORAL at 06:04

## 2023-01-01 RX ADMIN — CHOLESTYRAMINE 4 GRAM(S): 4 POWDER, FOR SUSPENSION ORAL at 10:05

## 2023-01-01 RX ADMIN — SODIUM CHLORIDE 1000 MILLILITER(S): 9 INJECTION INTRAMUSCULAR; INTRAVENOUS; SUBCUTANEOUS at 11:42

## 2023-01-01 RX ADMIN — Medication 100 MILLIEQUIVALENT(S): at 12:33

## 2023-01-01 RX ADMIN — OCTREOTIDE ACETATE 100 MICROGRAM(S): 200 INJECTION, SOLUTION INTRAVENOUS; SUBCUTANEOUS at 14:02

## 2023-01-01 RX ADMIN — ONDANSETRON 4 MILLIGRAM(S): 8 TABLET, FILM COATED ORAL at 12:38

## 2023-01-01 RX ADMIN — Medication 25 MILLIGRAM(S): at 18:30

## 2023-01-01 RX ADMIN — URSODIOL 300 MILLIGRAM(S): 250 TABLET, FILM COATED ORAL at 15:01

## 2023-01-01 RX ADMIN — Medication 5 MILLIGRAM(S): at 16:45

## 2023-01-01 RX ADMIN — Medication 1 EACH: at 18:34

## 2023-01-01 RX ADMIN — Medication 5 MILLIGRAM(S): at 06:10

## 2023-01-01 RX ADMIN — HEPARIN SODIUM 5000 UNIT(S): 5000 INJECTION INTRAVENOUS; SUBCUTANEOUS at 05:13

## 2023-01-01 RX ADMIN — Medication 1 EACH: at 17:43

## 2023-01-01 RX ADMIN — PANTOPRAZOLE SODIUM 40 MILLIGRAM(S): 20 TABLET, DELAYED RELEASE ORAL at 10:06

## 2023-01-01 RX ADMIN — Medication 2 TABLET(S): at 11:53

## 2023-01-01 RX ADMIN — SODIUM CHLORIDE 250 MILLILITER(S): 9 INJECTION INTRAMUSCULAR; INTRAVENOUS; SUBCUTANEOUS at 06:45

## 2023-01-01 RX ADMIN — PANTOPRAZOLE SODIUM 40 MILLIGRAM(S): 20 TABLET, DELAYED RELEASE ORAL at 10:35

## 2023-01-01 RX ADMIN — Medication 4 MILLIGRAM(S): at 05:38

## 2023-01-01 RX ADMIN — PANTOPRAZOLE SODIUM 40 MILLIGRAM(S): 20 TABLET, DELAYED RELEASE ORAL at 21:47

## 2023-01-01 RX ADMIN — CHLORHEXIDINE GLUCONATE 1 APPLICATION(S): 213 SOLUTION TOPICAL at 06:14

## 2023-01-01 RX ADMIN — URSODIOL 300 MILLIGRAM(S): 250 TABLET, FILM COATED ORAL at 16:03

## 2023-01-01 RX ADMIN — SODIUM CHLORIDE 500 MILLILITER(S): 9 INJECTION INTRAMUSCULAR; INTRAVENOUS; SUBCUTANEOUS at 19:19

## 2023-01-01 RX ADMIN — AMLODIPINE BESYLATE 10 MILLIGRAM(S): 2.5 TABLET ORAL at 07:57

## 2023-01-01 RX ADMIN — Medication 40 MICROGRAM(S): at 06:39

## 2023-01-01 RX ADMIN — Medication 100 MILLIGRAM(S): at 12:14

## 2023-01-01 RX ADMIN — Medication 65 MILLILITER(S): at 15:00

## 2023-01-01 RX ADMIN — Medication 2 TABLET(S): at 23:18

## 2023-01-01 RX ADMIN — AMLODIPINE BESYLATE 10 MILLIGRAM(S): 2.5 TABLET ORAL at 06:14

## 2023-01-01 RX ADMIN — CHLORHEXIDINE GLUCONATE 1 APPLICATION(S): 213 SOLUTION TOPICAL at 06:10

## 2023-01-01 RX ADMIN — IMIPENEM AND CILASTATIN 250 MILLIGRAM(S): 250; 250 INJECTION, POWDER, FOR SOLUTION INTRAVENOUS at 07:01

## 2023-01-01 RX ADMIN — IMIPENEM AND CILASTATIN 250 MILLIGRAM(S): 250; 250 INJECTION, POWDER, FOR SOLUTION INTRAVENOUS at 21:53

## 2023-01-01 RX ADMIN — NYSTATIN CREAM 1 APPLICATION(S): 100000 CREAM TOPICAL at 21:48

## 2023-01-01 RX ADMIN — MORPHINE 6 MILLIGRAM(S): 10 SOLUTION ORAL at 06:15

## 2023-01-01 RX ADMIN — PANTOPRAZOLE SODIUM 40 MILLIGRAM(S): 20 TABLET, DELAYED RELEASE ORAL at 09:47

## 2023-01-01 RX ADMIN — Medication 40 MICROGRAM(S): at 21:08

## 2023-01-01 RX ADMIN — I.V. FAT EMULSION 20.83 GM/KG/DAY: 20 EMULSION INTRAVENOUS at 17:23

## 2023-01-01 RX ADMIN — SODIUM CHLORIDE 1000 MILLILITER(S): 9 INJECTION INTRAMUSCULAR; INTRAVENOUS; SUBCUTANEOUS at 19:48

## 2023-01-01 RX ADMIN — LOSARTAN POTASSIUM 25 MILLIGRAM(S): 100 TABLET, FILM COATED ORAL at 06:27

## 2023-01-01 RX ADMIN — I.V. FAT EMULSION 20.83 GM/KG/DAY: 20 EMULSION INTRAVENOUS at 17:14

## 2023-01-01 RX ADMIN — Medication 65 MILLILITER(S): at 04:00

## 2023-01-01 RX ADMIN — Medication 5 MILLIGRAM(S): at 18:33

## 2023-01-01 RX ADMIN — LOSARTAN POTASSIUM 25 MILLIGRAM(S): 100 TABLET, FILM COATED ORAL at 06:25

## 2023-01-01 RX ADMIN — NYSTATIN CREAM 1 APPLICATION(S): 100000 CREAM TOPICAL at 05:01

## 2023-01-01 RX ADMIN — Medication 5 MILLIGRAM(S): at 23:15

## 2023-01-01 RX ADMIN — AMIODARONE HYDROCHLORIDE 200 MILLIGRAM(S): 400 TABLET ORAL at 05:32

## 2023-01-01 RX ADMIN — Medication 4 MILLIGRAM(S): at 17:32

## 2023-01-01 RX ADMIN — Medication 3 MILLIGRAM(S): at 22:04

## 2023-01-01 RX ADMIN — NYSTATIN CREAM 1 APPLICATION(S): 100000 CREAM TOPICAL at 21:56

## 2023-01-01 RX ADMIN — HYDROMORPHONE HYDROCHLORIDE 0.5 MILLIGRAM(S): 2 INJECTION INTRAMUSCULAR; INTRAVENOUS; SUBCUTANEOUS at 12:32

## 2023-01-01 RX ADMIN — Medication 150 MILLIGRAM(S): at 06:27

## 2023-01-01 RX ADMIN — NYSTATIN CREAM 1 APPLICATION(S): 100000 CREAM TOPICAL at 22:17

## 2023-01-01 RX ADMIN — Medication 65 MILLILITER(S): at 19:00

## 2023-01-01 RX ADMIN — NYSTATIN CREAM 1 APPLICATION(S): 100000 CREAM TOPICAL at 21:23

## 2023-01-01 RX ADMIN — Medication 2 TABLET(S): at 05:36

## 2023-01-01 RX ADMIN — URSODIOL 300 MILLIGRAM(S): 250 TABLET, FILM COATED ORAL at 14:28

## 2023-01-01 RX ADMIN — Medication 25 MILLIGRAM(S): at 09:06

## 2023-01-01 RX ADMIN — CHOLESTYRAMINE 4 GRAM(S): 4 POWDER, FOR SUSPENSION ORAL at 10:07

## 2023-01-01 RX ADMIN — Medication 10 MILLIGRAM(S): at 23:22

## 2023-01-01 RX ADMIN — Medication 5 MILLIGRAM(S): at 23:01

## 2023-01-01 RX ADMIN — Medication 10 MILLIGRAM(S): at 17:17

## 2023-01-01 RX ADMIN — Medication 5 MILLIGRAM(S): at 21:25

## 2023-01-01 RX ADMIN — DAPTOMYCIN 132 MILLIGRAM(S): 500 INJECTION, POWDER, LYOPHILIZED, FOR SOLUTION INTRAVENOUS at 12:20

## 2023-01-01 RX ADMIN — Medication 65 MILLILITER(S): at 10:00

## 2023-01-01 RX ADMIN — OCTREOTIDE ACETATE 100 MICROGRAM(S): 200 INJECTION, SOLUTION INTRAVENOUS; SUBCUTANEOUS at 22:30

## 2023-01-01 RX ADMIN — OCTREOTIDE ACETATE 100 MICROGRAM(S): 200 INJECTION, SOLUTION INTRAVENOUS; SUBCUTANEOUS at 10:48

## 2023-01-01 RX ADMIN — NYSTATIN CREAM 1 APPLICATION(S): 100000 CREAM TOPICAL at 00:26

## 2023-01-01 RX ADMIN — Medication 5 MILLIGRAM(S): at 05:29

## 2023-01-01 RX ADMIN — PANTOPRAZOLE SODIUM 40 MILLIGRAM(S): 20 TABLET, DELAYED RELEASE ORAL at 08:27

## 2023-01-01 RX ADMIN — URSODIOL 300 MILLIGRAM(S): 250 TABLET, FILM COATED ORAL at 22:19

## 2023-01-01 RX ADMIN — Medication 50 MICROGRAM(S): at 06:40

## 2023-01-01 RX ADMIN — SODIUM CHLORIDE 500 MILLILITER(S): 9 INJECTION, SOLUTION INTRAVENOUS at 00:23

## 2023-01-01 RX ADMIN — SODIUM CHLORIDE 1000 MILLILITER(S): 9 INJECTION, SOLUTION INTRAVENOUS at 17:25

## 2023-01-01 RX ADMIN — OCTREOTIDE ACETATE 100 MICROGRAM(S): 200 INJECTION, SOLUTION INTRAVENOUS; SUBCUTANEOUS at 09:47

## 2023-01-01 RX ADMIN — MORPHINE 6 MILLIGRAM(S): 10 SOLUTION ORAL at 17:26

## 2023-01-01 RX ADMIN — Medication 1 EACH: at 17:58

## 2023-01-01 RX ADMIN — Medication 4 MILLIGRAM(S): at 05:30

## 2023-01-01 RX ADMIN — Medication 2 TABLET(S): at 23:20

## 2023-01-01 RX ADMIN — Medication 50 MILLILITER(S): at 16:32

## 2023-01-01 RX ADMIN — NYSTATIN CREAM 1 APPLICATION(S): 100000 CREAM TOPICAL at 15:38

## 2023-01-01 RX ADMIN — PANTOPRAZOLE SODIUM 40 MILLIGRAM(S): 20 TABLET, DELAYED RELEASE ORAL at 10:01

## 2023-01-01 RX ADMIN — LOSARTAN POTASSIUM 25 MILLIGRAM(S): 100 TABLET, FILM COATED ORAL at 06:13

## 2023-01-01 RX ADMIN — Medication 1 EACH: at 17:09

## 2023-01-01 RX ADMIN — Medication 1000 MILLIGRAM(S): at 23:46

## 2023-01-01 RX ADMIN — Medication 1 MILLIGRAM(S): at 16:31

## 2023-01-01 RX ADMIN — I.V. FAT EMULSION 20.8 GM/KG/DAY: 20 EMULSION INTRAVENOUS at 18:50

## 2023-01-01 RX ADMIN — Medication 150 MILLIGRAM(S): at 06:00

## 2023-01-01 RX ADMIN — MORPHINE 2 MILLIGRAM(S): 10 SOLUTION ORAL at 05:39

## 2023-01-01 RX ADMIN — Medication 5 MILLIGRAM(S): at 05:55

## 2023-01-01 RX ADMIN — CHLORHEXIDINE GLUCONATE 1 APPLICATION(S): 213 SOLUTION TOPICAL at 07:20

## 2023-01-01 RX ADMIN — CHLORHEXIDINE GLUCONATE 1 APPLICATION(S): 213 SOLUTION TOPICAL at 07:56

## 2023-01-01 RX ADMIN — ATORVASTATIN CALCIUM 20 MILLIGRAM(S): 80 TABLET, FILM COATED ORAL at 22:13

## 2023-01-01 RX ADMIN — LOSARTAN POTASSIUM 25 MILLIGRAM(S): 100 TABLET, FILM COATED ORAL at 05:11

## 2023-01-01 RX ADMIN — ENOXAPARIN SODIUM 100 MILLIGRAM(S): 100 INJECTION SUBCUTANEOUS at 05:52

## 2023-01-01 RX ADMIN — OCTREOTIDE ACETATE 100 MICROGRAM(S): 200 INJECTION, SOLUTION INTRAVENOUS; SUBCUTANEOUS at 13:11

## 2023-01-01 RX ADMIN — HEPARIN SODIUM 5000 UNIT(S): 5000 INJECTION INTRAVENOUS; SUBCUTANEOUS at 13:00

## 2023-01-01 RX ADMIN — HYDROMORPHONE HYDROCHLORIDE 0.2 MILLIGRAM(S): 2 INJECTION INTRAMUSCULAR; INTRAVENOUS; SUBCUTANEOUS at 09:55

## 2023-01-01 RX ADMIN — I.V. FAT EMULSION 41.67 GM/KG/DAY: 20 EMULSION INTRAVENOUS at 18:55

## 2023-01-01 RX ADMIN — I.V. FAT EMULSION 20.8 GM/KG/DAY: 20 EMULSION INTRAVENOUS at 17:25

## 2023-01-01 RX ADMIN — URSODIOL 300 MILLIGRAM(S): 250 TABLET, FILM COATED ORAL at 14:35

## 2023-01-01 RX ADMIN — Medication 4 MILLIGRAM(S): at 05:47

## 2023-01-01 RX ADMIN — OCTREOTIDE ACETATE 100 MICROGRAM(S): 200 INJECTION, SOLUTION INTRAVENOUS; SUBCUTANEOUS at 09:19

## 2023-01-01 RX ADMIN — Medication 2 TABLET(S): at 23:02

## 2023-01-01 RX ADMIN — URSODIOL 300 MILLIGRAM(S): 250 TABLET, FILM COATED ORAL at 22:23

## 2023-01-01 RX ADMIN — CHOLESTYRAMINE 4 GRAM(S): 4 POWDER, FOR SUSPENSION ORAL at 09:07

## 2023-01-01 RX ADMIN — HYDROMORPHONE HYDROCHLORIDE 0.5 MILLIGRAM(S): 2 INJECTION INTRAMUSCULAR; INTRAVENOUS; SUBCUTANEOUS at 10:35

## 2023-01-01 RX ADMIN — I.V. FAT EMULSION 41.67 GM/KG/DAY: 20 EMULSION INTRAVENOUS at 17:53

## 2023-01-01 RX ADMIN — OCTREOTIDE ACETATE 100 MICROGRAM(S): 200 INJECTION, SOLUTION INTRAVENOUS; SUBCUTANEOUS at 16:03

## 2023-01-01 RX ADMIN — I.V. FAT EMULSION 20.83 GM/KG/DAY: 20 EMULSION INTRAVENOUS at 18:03

## 2023-01-01 RX ADMIN — PANTOPRAZOLE SODIUM 40 MILLIGRAM(S): 20 TABLET, DELAYED RELEASE ORAL at 11:35

## 2023-01-01 RX ADMIN — Medication 250 MILLIGRAM(S): at 22:14

## 2023-01-01 RX ADMIN — CEFEPIME 100 MILLIGRAM(S): 1 INJECTION, POWDER, FOR SOLUTION INTRAMUSCULAR; INTRAVENOUS at 00:04

## 2023-01-01 RX ADMIN — Medication 1 EACH: at 17:22

## 2023-01-01 RX ADMIN — HEPARIN SODIUM 5000 UNIT(S): 5000 INJECTION INTRAVENOUS; SUBCUTANEOUS at 13:14

## 2023-01-01 RX ADMIN — Medication 150 MILLIGRAM(S): at 06:40

## 2023-01-01 RX ADMIN — CHOLESTYRAMINE 4 GRAM(S): 4 POWDER, FOR SUSPENSION ORAL at 16:36

## 2023-01-01 RX ADMIN — CHLORHEXIDINE GLUCONATE 1 APPLICATION(S): 213 SOLUTION TOPICAL at 05:37

## 2023-01-01 RX ADMIN — CEFEPIME 100 MILLIGRAM(S): 1 INJECTION, POWDER, FOR SOLUTION INTRAMUSCULAR; INTRAVENOUS at 17:08

## 2023-01-01 RX ADMIN — Medication 2 TABLET(S): at 23:21

## 2023-01-01 RX ADMIN — Medication 3 MILLILITER(S): at 22:29

## 2023-01-01 RX ADMIN — Medication 4 MILLIGRAM(S): at 17:28

## 2023-01-01 RX ADMIN — Medication 2 TABLET(S): at 12:24

## 2023-01-01 RX ADMIN — Medication 4 MILLIGRAM(S): at 13:06

## 2023-01-01 RX ADMIN — HEPARIN SODIUM 5000 UNIT(S): 5000 INJECTION INTRAVENOUS; SUBCUTANEOUS at 12:37

## 2023-01-01 RX ADMIN — NYSTATIN CREAM 1 APPLICATION(S): 100000 CREAM TOPICAL at 14:10

## 2023-01-01 RX ADMIN — Medication 1 EACH: at 17:37

## 2023-01-01 RX ADMIN — HEPARIN SODIUM 5000 UNIT(S): 5000 INJECTION INTRAVENOUS; SUBCUTANEOUS at 21:29

## 2023-01-01 RX ADMIN — Medication 10 MILLIGRAM(S): at 21:43

## 2023-01-01 RX ADMIN — Medication 1 EACH: at 18:55

## 2023-01-01 RX ADMIN — I.V. FAT EMULSION 20.8 GM/KG/DAY: 20 EMULSION INTRAVENOUS at 17:57

## 2023-01-01 RX ADMIN — MORPHINE 6 MILLIGRAM(S): 10 SOLUTION ORAL at 06:27

## 2023-01-01 RX ADMIN — Medication 50 MILLILITER(S): at 19:42

## 2023-01-01 RX ADMIN — OCTREOTIDE ACETATE 100 MICROGRAM(S): 200 INJECTION, SOLUTION INTRAVENOUS; SUBCUTANEOUS at 06:46

## 2023-01-01 RX ADMIN — OCTREOTIDE ACETATE 100 MICROGRAM(S): 200 INJECTION, SOLUTION INTRAVENOUS; SUBCUTANEOUS at 15:22

## 2023-01-01 RX ADMIN — Medication 25 MILLIGRAM(S): at 22:56

## 2023-01-01 RX ADMIN — NYSTATIN CREAM 1 APPLICATION(S): 100000 CREAM TOPICAL at 22:26

## 2023-01-01 RX ADMIN — Medication 1 EACH: at 17:57

## 2023-01-01 RX ADMIN — Medication 250 MILLIGRAM(S): at 10:27

## 2023-01-01 RX ADMIN — PANTOPRAZOLE SODIUM 40 MILLIGRAM(S): 20 TABLET, DELAYED RELEASE ORAL at 12:16

## 2023-01-01 RX ADMIN — Medication 5 MILLIGRAM(S): at 22:28

## 2023-01-01 RX ADMIN — Medication 2 TABLET(S): at 23:16

## 2023-01-01 RX ADMIN — I.V. FAT EMULSION 20.83 GM/KG/DAY: 20 EMULSION INTRAVENOUS at 17:39

## 2023-01-01 RX ADMIN — Medication 5 MILLIGRAM(S): at 23:03

## 2023-01-01 RX ADMIN — I.V. FAT EMULSION 41.67 GM/KG/DAY: 20 EMULSION INTRAVENOUS at 17:31

## 2023-01-01 RX ADMIN — Medication 4 MILLIGRAM(S): at 09:28

## 2023-01-01 RX ADMIN — Medication 4 MILLIGRAM(S): at 12:33

## 2023-01-01 RX ADMIN — Medication 5 MILLIGRAM(S): at 17:03

## 2023-01-01 RX ADMIN — NYSTATIN CREAM 1 APPLICATION(S): 100000 CREAM TOPICAL at 21:36

## 2023-01-01 RX ADMIN — OCTREOTIDE ACETATE 100 MICROGRAM(S): 200 INJECTION, SOLUTION INTRAVENOUS; SUBCUTANEOUS at 12:21

## 2023-01-01 RX ADMIN — Medication 5 MILLIGRAM(S): at 18:44

## 2023-01-01 RX ADMIN — LOSARTAN POTASSIUM 25 MILLIGRAM(S): 100 TABLET, FILM COATED ORAL at 06:32

## 2023-01-01 RX ADMIN — Medication 4 MILLIGRAM(S): at 06:20

## 2023-01-01 RX ADMIN — Medication 5 MILLIGRAM(S): at 10:07

## 2023-01-01 RX ADMIN — Medication 10 MILLIGRAM(S): at 06:14

## 2023-01-01 RX ADMIN — Medication 65 MILLILITER(S): at 05:00

## 2023-01-01 RX ADMIN — HALOPERIDOL DECANOATE 2 MILLIGRAM(S): 100 INJECTION INTRAMUSCULAR at 02:36

## 2023-01-01 RX ADMIN — I.V. FAT EMULSION 41.67 GM/KG/DAY: 20 EMULSION INTRAVENOUS at 17:40

## 2023-01-01 RX ADMIN — CHOLESTYRAMINE 4 GRAM(S): 4 POWDER, FOR SUSPENSION ORAL at 09:24

## 2023-01-01 RX ADMIN — HEPARIN SODIUM 5000 UNIT(S): 5000 INJECTION INTRAVENOUS; SUBCUTANEOUS at 21:56

## 2023-01-01 RX ADMIN — Medication 90 MILLILITER(S): at 10:04

## 2023-01-01 RX ADMIN — Medication 10 MILLIGRAM(S): at 15:21

## 2023-01-01 RX ADMIN — HEPARIN SODIUM 5000 UNIT(S): 5000 INJECTION INTRAVENOUS; SUBCUTANEOUS at 13:53

## 2023-01-01 RX ADMIN — ATORVASTATIN CALCIUM 20 MILLIGRAM(S): 80 TABLET, FILM COATED ORAL at 22:37

## 2023-01-01 RX ADMIN — Medication 1 APPLICATION(S): at 10:20

## 2023-01-01 RX ADMIN — HYDROMORPHONE HYDROCHLORIDE 0.5 MILLIGRAM(S): 2 INJECTION INTRAMUSCULAR; INTRAVENOUS; SUBCUTANEOUS at 20:29

## 2023-01-01 RX ADMIN — OCTREOTIDE ACETATE 100 MICROGRAM(S): 200 INJECTION, SOLUTION INTRAVENOUS; SUBCUTANEOUS at 21:31

## 2023-01-01 RX ADMIN — Medication 65 MILLILITER(S): at 23:00

## 2023-01-01 RX ADMIN — Medication 40 MICROGRAM(S): at 23:29

## 2023-01-01 RX ADMIN — URSODIOL 300 MILLIGRAM(S): 250 TABLET, FILM COATED ORAL at 21:55

## 2023-01-01 RX ADMIN — Medication 150 MILLIGRAM(S): at 17:36

## 2023-01-01 RX ADMIN — SODIUM CHLORIDE 100 MILLILITER(S): 9 INJECTION, SOLUTION INTRAVENOUS at 19:50

## 2023-01-01 RX ADMIN — OCTREOTIDE ACETATE 100 MICROGRAM(S): 200 INJECTION, SOLUTION INTRAVENOUS; SUBCUTANEOUS at 05:23

## 2023-01-01 RX ADMIN — HEPARIN SODIUM 5000 UNIT(S): 5000 INJECTION INTRAVENOUS; SUBCUTANEOUS at 17:05

## 2023-01-01 RX ADMIN — URSODIOL 300 MILLIGRAM(S): 250 TABLET, FILM COATED ORAL at 15:25

## 2023-01-01 RX ADMIN — ATORVASTATIN CALCIUM 20 MILLIGRAM(S): 80 TABLET, FILM COATED ORAL at 22:31

## 2023-01-01 RX ADMIN — ENOXAPARIN SODIUM 100 MILLIGRAM(S): 100 INJECTION SUBCUTANEOUS at 06:50

## 2023-01-01 RX ADMIN — HEPARIN SODIUM 5000 UNIT(S): 5000 INJECTION INTRAVENOUS; SUBCUTANEOUS at 05:43

## 2023-01-01 RX ADMIN — Medication 4 MILLIGRAM(S): at 23:10

## 2023-01-01 RX ADMIN — CHOLESTYRAMINE 4 GRAM(S): 4 POWDER, FOR SUSPENSION ORAL at 08:54

## 2023-01-01 RX ADMIN — Medication 40 MILLIGRAM(S): at 17:19

## 2023-01-01 RX ADMIN — Medication 3 MILLILITER(S): at 03:04

## 2023-01-01 RX ADMIN — ERYTHROPOIETIN 10000 UNIT(S): 10000 INJECTION, SOLUTION INTRAVENOUS; SUBCUTANEOUS at 18:06

## 2023-01-01 RX ADMIN — Medication 4 MILLIGRAM(S): at 23:13

## 2023-01-01 RX ADMIN — CHOLESTYRAMINE 4 GRAM(S): 4 POWDER, FOR SUSPENSION ORAL at 08:53

## 2023-01-01 RX ADMIN — Medication 50 MILLILITER(S): at 05:18

## 2023-01-01 RX ADMIN — NYSTATIN CREAM 1 APPLICATION(S): 100000 CREAM TOPICAL at 06:15

## 2023-01-01 RX ADMIN — Medication 150 MILLIGRAM(S): at 18:00

## 2023-01-01 RX ADMIN — AMLODIPINE BESYLATE 10 MILLIGRAM(S): 2.5 TABLET ORAL at 11:41

## 2023-01-01 RX ADMIN — Medication 2 TABLET(S): at 05:39

## 2023-01-01 RX ADMIN — Medication 4 MILLIGRAM(S): at 12:36

## 2023-01-01 RX ADMIN — CHOLESTYRAMINE 4 GRAM(S): 4 POWDER, FOR SUSPENSION ORAL at 21:54

## 2023-01-01 RX ADMIN — HEPARIN SODIUM 5000 UNIT(S): 5000 INJECTION INTRAVENOUS; SUBCUTANEOUS at 07:11

## 2023-01-01 RX ADMIN — OCTREOTIDE ACETATE 100 MICROGRAM(S): 200 INJECTION, SOLUTION INTRAVENOUS; SUBCUTANEOUS at 17:42

## 2023-01-01 RX ADMIN — Medication 25 GRAM(S): at 09:06

## 2023-01-01 RX ADMIN — Medication 10 MILLIGRAM(S): at 06:25

## 2023-01-01 RX ADMIN — CHLORHEXIDINE GLUCONATE 1 APPLICATION(S): 213 SOLUTION TOPICAL at 05:58

## 2023-01-01 RX ADMIN — OCTREOTIDE ACETATE 100 MICROGRAM(S): 200 INJECTION, SOLUTION INTRAVENOUS; SUBCUTANEOUS at 00:03

## 2023-01-01 RX ADMIN — ENOXAPARIN SODIUM 100 MILLIGRAM(S): 100 INJECTION SUBCUTANEOUS at 17:26

## 2023-01-01 RX ADMIN — OCTREOTIDE ACETATE 100 MICROGRAM(S): 200 INJECTION, SOLUTION INTRAVENOUS; SUBCUTANEOUS at 17:25

## 2023-01-01 RX ADMIN — HEPARIN SODIUM 5000 UNIT(S): 5000 INJECTION INTRAVENOUS; SUBCUTANEOUS at 07:06

## 2023-01-01 RX ADMIN — Medication 50 MICROGRAM(S): at 06:44

## 2023-01-01 RX ADMIN — Medication 2 TABLET(S): at 05:40

## 2023-01-01 RX ADMIN — HEPARIN SODIUM 5000 UNIT(S): 5000 INJECTION INTRAVENOUS; SUBCUTANEOUS at 23:08

## 2023-01-01 RX ADMIN — Medication 25 MILLIGRAM(S): at 10:44

## 2023-01-01 RX ADMIN — AMIODARONE HYDROCHLORIDE 16.7 MG/MIN: 400 TABLET ORAL at 15:37

## 2023-01-01 RX ADMIN — Medication 5 MILLIGRAM(S): at 14:36

## 2023-01-01 RX ADMIN — POTASSIUM PHOSPHATE, MONOBASIC POTASSIUM PHOSPHATE, DIBASIC 62.5 MILLIMOLE(S): 236; 224 INJECTION, SOLUTION INTRAVENOUS at 08:17

## 2023-01-01 RX ADMIN — MORPHINE 6 MILLIGRAM(S): 10 SOLUTION ORAL at 06:14

## 2023-01-01 RX ADMIN — Medication 65 MILLILITER(S): at 22:00

## 2023-01-01 RX ADMIN — Medication 5 MILLIGRAM(S): at 00:00

## 2023-01-01 RX ADMIN — Medication 40 MILLIGRAM(S): at 03:08

## 2023-01-01 RX ADMIN — PANTOPRAZOLE SODIUM 40 MILLIGRAM(S): 20 TABLET, DELAYED RELEASE ORAL at 11:20

## 2023-01-01 RX ADMIN — Medication 150 MILLIGRAM(S): at 17:48

## 2023-01-01 RX ADMIN — Medication 4 MILLIGRAM(S): at 11:58

## 2023-01-01 RX ADMIN — Medication 25 MILLIGRAM(S): at 21:56

## 2023-01-01 RX ADMIN — SODIUM CHLORIDE 1000 MILLILITER(S): 9 INJECTION, SOLUTION INTRAVENOUS at 10:27

## 2023-01-01 RX ADMIN — Medication 150 MILLILITER(S): at 16:46

## 2023-01-01 RX ADMIN — Medication 150 MILLIGRAM(S): at 05:13

## 2023-01-01 RX ADMIN — Medication 50 MILLILITER(S): at 16:16

## 2023-01-01 RX ADMIN — Medication 1 EACH: at 17:35

## 2023-01-01 RX ADMIN — Medication 4 MILLIGRAM(S): at 17:42

## 2023-01-01 RX ADMIN — Medication 10 MILLIGRAM(S): at 13:04

## 2023-01-01 RX ADMIN — Medication 5 MILLIGRAM(S): at 17:26

## 2023-01-01 RX ADMIN — MORPHINE 6 MILLIGRAM(S): 10 SOLUTION ORAL at 05:22

## 2023-01-01 RX ADMIN — Medication 150 MILLIGRAM(S): at 05:36

## 2023-01-01 RX ADMIN — HEPARIN SODIUM 5000 UNIT(S): 5000 INJECTION INTRAVENOUS; SUBCUTANEOUS at 22:09

## 2023-01-01 RX ADMIN — Medication 4 MILLIGRAM(S): at 23:17

## 2023-01-01 RX ADMIN — I.V. FAT EMULSION 41.67 GM/KG/DAY: 20 EMULSION INTRAVENOUS at 18:25

## 2023-01-01 RX ADMIN — MORPHINE 6 MILLIGRAM(S): 10 SOLUTION ORAL at 17:04

## 2023-01-01 RX ADMIN — Medication 1 EACH: at 17:56

## 2023-01-01 RX ADMIN — URSODIOL 300 MILLIGRAM(S): 250 TABLET, FILM COATED ORAL at 06:30

## 2023-01-01 RX ADMIN — URSODIOL 300 MILLIGRAM(S): 250 TABLET, FILM COATED ORAL at 22:22

## 2023-01-01 RX ADMIN — LOSARTAN POTASSIUM 25 MILLIGRAM(S): 100 TABLET, FILM COATED ORAL at 05:59

## 2023-01-01 RX ADMIN — Medication 3 MILLILITER(S): at 23:33

## 2023-01-01 RX ADMIN — HYDROMORPHONE HYDROCHLORIDE 0.5 MILLIGRAM(S): 2 INJECTION INTRAMUSCULAR; INTRAVENOUS; SUBCUTANEOUS at 13:28

## 2023-01-01 RX ADMIN — MORPHINE 6 MILLIGRAM(S): 10 SOLUTION ORAL at 23:08

## 2023-01-01 RX ADMIN — Medication 5 MILLIGRAM(S): at 11:10

## 2023-01-01 RX ADMIN — HEPARIN SODIUM 5000 UNIT(S): 5000 INJECTION INTRAVENOUS; SUBCUTANEOUS at 13:23

## 2023-01-01 RX ADMIN — CHLORHEXIDINE GLUCONATE 15 MILLILITER(S): 213 SOLUTION TOPICAL at 06:11

## 2023-01-01 RX ADMIN — NYSTATIN CREAM 1 APPLICATION(S): 100000 CREAM TOPICAL at 07:57

## 2023-01-01 RX ADMIN — Medication 250 MILLIGRAM(S): at 15:00

## 2023-01-01 RX ADMIN — Medication 5 MILLIGRAM(S): at 18:39

## 2023-01-01 RX ADMIN — Medication 30 MICROGRAM(S): at 22:30

## 2023-01-01 RX ADMIN — PANTOPRAZOLE SODIUM 40 MILLIGRAM(S): 20 TABLET, DELAYED RELEASE ORAL at 10:04

## 2023-01-01 RX ADMIN — Medication 4 MILLIGRAM(S): at 17:12

## 2023-01-01 RX ADMIN — MORPHINE 6 MILLIGRAM(S): 10 SOLUTION ORAL at 06:25

## 2023-01-01 RX ADMIN — AMIODARONE HYDROCHLORIDE 200 MILLIGRAM(S): 400 TABLET ORAL at 06:00

## 2023-01-01 RX ADMIN — Medication 2 TABLET(S): at 16:57

## 2023-01-01 RX ADMIN — Medication 5 MILLIGRAM(S): at 23:41

## 2023-01-01 RX ADMIN — CHLORHEXIDINE GLUCONATE 15 MILLILITER(S): 213 SOLUTION TOPICAL at 17:51

## 2023-01-01 RX ADMIN — OCTREOTIDE ACETATE 100 MICROGRAM(S): 200 INJECTION, SOLUTION INTRAVENOUS; SUBCUTANEOUS at 07:16

## 2023-01-01 RX ADMIN — Medication 3 MILLILITER(S): at 09:32

## 2023-01-01 RX ADMIN — URSODIOL 300 MILLIGRAM(S): 250 TABLET, FILM COATED ORAL at 14:00

## 2023-01-01 RX ADMIN — Medication 4 MILLIGRAM(S): at 23:20

## 2023-01-01 RX ADMIN — CHOLESTYRAMINE 4 GRAM(S): 4 POWDER, FOR SUSPENSION ORAL at 16:48

## 2023-01-01 RX ADMIN — SODIUM CHLORIDE 250 MILLILITER(S): 9 INJECTION INTRAMUSCULAR; INTRAVENOUS; SUBCUTANEOUS at 14:06

## 2023-01-01 RX ADMIN — Medication 10 MILLIGRAM(S): at 16:34

## 2023-01-01 RX ADMIN — Medication 2 TABLET(S): at 22:26

## 2023-01-01 RX ADMIN — URSODIOL 300 MILLIGRAM(S): 250 TABLET, FILM COATED ORAL at 05:14

## 2023-01-01 RX ADMIN — URSODIOL 300 MILLIGRAM(S): 250 TABLET, FILM COATED ORAL at 16:37

## 2023-01-01 RX ADMIN — HYDROMORPHONE HYDROCHLORIDE 0.5 MILLIGRAM(S): 2 INJECTION INTRAMUSCULAR; INTRAVENOUS; SUBCUTANEOUS at 12:44

## 2023-01-01 RX ADMIN — CHOLESTYRAMINE 4 GRAM(S): 4 POWDER, FOR SUSPENSION ORAL at 11:27

## 2023-01-01 RX ADMIN — MORPHINE 6 MILLIGRAM(S): 10 SOLUTION ORAL at 11:19

## 2023-01-01 RX ADMIN — ONDANSETRON 4 MILLIGRAM(S): 8 TABLET, FILM COATED ORAL at 14:18

## 2023-01-01 RX ADMIN — IMIPENEM AND CILASTATIN 250 MILLIGRAM(S): 250; 250 INJECTION, POWDER, FOR SOLUTION INTRAVENOUS at 13:36

## 2023-01-01 RX ADMIN — Medication 0.5 MILLIGRAM(S): at 03:04

## 2023-01-01 RX ADMIN — DEXMEDETOMIDINE HYDROCHLORIDE IN 0.9% SODIUM CHLORIDE 12.6 MICROGRAM(S)/KG/HR: 4 INJECTION INTRAVENOUS at 21:34

## 2023-01-01 RX ADMIN — ATORVASTATIN CALCIUM 20 MILLIGRAM(S): 80 TABLET, FILM COATED ORAL at 21:45

## 2023-01-01 RX ADMIN — MORPHINE 6 MILLIGRAM(S): 10 SOLUTION ORAL at 06:20

## 2023-01-01 RX ADMIN — NYSTATIN CREAM 1 APPLICATION(S): 100000 CREAM TOPICAL at 14:48

## 2023-01-01 RX ADMIN — HEPARIN SODIUM 5000 UNIT(S): 5000 INJECTION INTRAVENOUS; SUBCUTANEOUS at 15:23

## 2023-01-01 RX ADMIN — Medication 4 MILLIGRAM(S): at 11:52

## 2023-01-01 RX ADMIN — SODIUM CHLORIDE 120 MILLILITER(S): 9 INJECTION, SOLUTION INTRAVENOUS at 21:11

## 2023-01-01 RX ADMIN — HEPARIN SODIUM 5000 UNIT(S): 5000 INJECTION INTRAVENOUS; SUBCUTANEOUS at 16:00

## 2023-01-01 RX ADMIN — HEPARIN SODIUM 5000 UNIT(S): 5000 INJECTION INTRAVENOUS; SUBCUTANEOUS at 21:41

## 2023-01-01 RX ADMIN — Medication 4 MILLIGRAM(S): at 18:12

## 2023-01-01 RX ADMIN — Medication 1 EACH: at 17:08

## 2023-01-01 RX ADMIN — Medication 10 MILLIGRAM(S): at 22:10

## 2023-01-01 RX ADMIN — Medication 2 TABLET(S): at 18:14

## 2023-01-01 RX ADMIN — Medication 400 MILLIGRAM(S): at 23:56

## 2023-01-01 RX ADMIN — IRON SUCROSE 110 MILLIGRAM(S): 20 INJECTION, SOLUTION INTRAVENOUS at 12:25

## 2023-01-01 RX ADMIN — Medication 4 MILLIGRAM(S): at 12:17

## 2023-01-01 RX ADMIN — Medication 5 MILLIGRAM(S): at 05:44

## 2023-01-01 RX ADMIN — Medication 1 MILLIGRAM(S): at 11:13

## 2023-01-01 RX ADMIN — AMLODIPINE BESYLATE 10 MILLIGRAM(S): 2.5 TABLET ORAL at 06:01

## 2023-01-01 RX ADMIN — SODIUM CHLORIDE 500 MILLILITER(S): 9 INJECTION, SOLUTION INTRAVENOUS at 15:00

## 2023-01-01 RX ADMIN — OCTREOTIDE ACETATE 100 MICROGRAM(S): 200 INJECTION, SOLUTION INTRAVENOUS; SUBCUTANEOUS at 23:14

## 2023-01-01 RX ADMIN — Medication 10 MILLIGRAM(S): at 05:58

## 2023-01-01 RX ADMIN — Medication 50 MILLILITER(S): at 23:39

## 2023-01-01 RX ADMIN — HEPARIN SODIUM 5000 UNIT(S): 5000 INJECTION INTRAVENOUS; SUBCUTANEOUS at 06:13

## 2023-01-01 RX ADMIN — Medication 2 TABLET(S): at 17:13

## 2023-01-01 RX ADMIN — MORPHINE 6 MILLIGRAM(S): 10 SOLUTION ORAL at 17:56

## 2023-01-01 RX ADMIN — Medication 10 MILLIGRAM(S): at 19:34

## 2023-01-01 RX ADMIN — MORPHINE 6 MILLIGRAM(S): 10 SOLUTION ORAL at 18:13

## 2023-01-01 RX ADMIN — CHLORHEXIDINE GLUCONATE 1 APPLICATION(S): 213 SOLUTION TOPICAL at 05:44

## 2023-01-01 RX ADMIN — SODIUM CHLORIDE 1000 MILLILITER(S): 9 INJECTION, SOLUTION INTRAVENOUS at 22:10

## 2023-01-01 RX ADMIN — Medication 250 MILLIGRAM(S): at 09:23

## 2023-01-01 RX ADMIN — I.V. FAT EMULSION 41.67 GM/KG/DAY: 20 EMULSION INTRAVENOUS at 17:34

## 2023-01-01 RX ADMIN — FLUCONAZOLE 100 MILLIGRAM(S): 150 TABLET ORAL at 01:47

## 2023-01-01 RX ADMIN — CHOLESTYRAMINE 4 GRAM(S): 4 POWDER, FOR SUSPENSION ORAL at 21:25

## 2023-01-01 RX ADMIN — OCTREOTIDE ACETATE 100 MICROGRAM(S): 200 INJECTION, SOLUTION INTRAVENOUS; SUBCUTANEOUS at 06:01

## 2023-01-01 RX ADMIN — Medication 5 MILLIGRAM(S): at 23:28

## 2023-01-01 RX ADMIN — Medication 5 MILLIGRAM(S): at 11:38

## 2023-01-01 RX ADMIN — Medication 40 MILLIGRAM(S): at 11:44

## 2023-01-01 RX ADMIN — HEPARIN SODIUM 5000 UNIT(S): 5000 INJECTION INTRAVENOUS; SUBCUTANEOUS at 23:03

## 2023-01-01 RX ADMIN — ATORVASTATIN CALCIUM 20 MILLIGRAM(S): 80 TABLET, FILM COATED ORAL at 21:25

## 2023-01-01 RX ADMIN — Medication 2 TABLET(S): at 06:11

## 2023-01-01 RX ADMIN — NYSTATIN CREAM 1 APPLICATION(S): 100000 CREAM TOPICAL at 14:24

## 2023-01-01 RX ADMIN — NYSTATIN CREAM 1 APPLICATION(S): 100000 CREAM TOPICAL at 22:30

## 2023-01-01 RX ADMIN — Medication 25 MILLIGRAM(S): at 09:03

## 2023-01-01 RX ADMIN — DEXMEDETOMIDINE HYDROCHLORIDE IN 0.9% SODIUM CHLORIDE 5.05 MICROGRAM(S)/KG/HR: 4 INJECTION INTRAVENOUS at 19:00

## 2023-01-01 RX ADMIN — Medication 20 MILLIGRAM(S): at 00:48

## 2023-01-01 RX ADMIN — CHOLESTYRAMINE 4 GRAM(S): 4 POWDER, FOR SUSPENSION ORAL at 17:41

## 2023-01-01 RX ADMIN — Medication 40 MICROGRAM(S): at 21:57

## 2023-01-01 RX ADMIN — Medication 4 MILLIGRAM(S): at 00:37

## 2023-01-01 RX ADMIN — Medication 4 MILLIGRAM(S): at 13:20

## 2023-01-01 RX ADMIN — URSODIOL 300 MILLIGRAM(S): 250 TABLET, FILM COATED ORAL at 22:00

## 2023-01-01 RX ADMIN — CHLORHEXIDINE GLUCONATE 15 MILLILITER(S): 213 SOLUTION TOPICAL at 17:32

## 2023-01-01 RX ADMIN — CHLORHEXIDINE GLUCONATE 1 APPLICATION(S): 213 SOLUTION TOPICAL at 06:30

## 2023-01-01 RX ADMIN — HEPARIN SODIUM 5000 UNIT(S): 5000 INJECTION INTRAVENOUS; SUBCUTANEOUS at 13:18

## 2023-01-01 RX ADMIN — AMLODIPINE BESYLATE 10 MILLIGRAM(S): 2.5 TABLET ORAL at 05:24

## 2023-01-01 RX ADMIN — Medication 1 EACH: at 17:33

## 2023-01-01 RX ADMIN — CHOLESTYRAMINE 4 GRAM(S): 4 POWDER, FOR SUSPENSION ORAL at 22:13

## 2023-01-01 RX ADMIN — Medication 25 MILLIGRAM(S): at 21:53

## 2023-01-01 RX ADMIN — Medication 150 MILLIGRAM(S): at 12:14

## 2023-01-01 RX ADMIN — Medication 5 MILLIGRAM(S): at 22:33

## 2023-01-01 RX ADMIN — CHLORHEXIDINE GLUCONATE 1 APPLICATION(S): 213 SOLUTION TOPICAL at 06:24

## 2023-01-01 RX ADMIN — I.V. FAT EMULSION 41.67 GM/KG/DAY: 20 EMULSION INTRAVENOUS at 18:50

## 2023-01-01 RX ADMIN — Medication 1000 MILLIGRAM(S): at 17:00

## 2023-01-01 RX ADMIN — Medication 4 MILLIGRAM(S): at 05:01

## 2023-01-01 RX ADMIN — Medication 4 MILLIGRAM(S): at 05:58

## 2023-01-01 RX ADMIN — AMLODIPINE BESYLATE 10 MILLIGRAM(S): 2.5 TABLET ORAL at 06:40

## 2023-01-01 RX ADMIN — Medication 2 TABLET(S): at 06:17

## 2023-01-01 RX ADMIN — ENOXAPARIN SODIUM 100 MILLIGRAM(S): 100 INJECTION SUBCUTANEOUS at 05:38

## 2023-01-01 RX ADMIN — AMIODARONE HYDROCHLORIDE 200 MILLIGRAM(S): 400 TABLET ORAL at 07:35

## 2023-01-01 RX ADMIN — Medication 40 MICROGRAM(S): at 07:06

## 2023-01-01 RX ADMIN — Medication 300 MILLIGRAM(S): at 11:03

## 2023-01-01 RX ADMIN — OCTREOTIDE ACETATE 100 MICROGRAM(S): 200 INJECTION, SOLUTION INTRAVENOUS; SUBCUTANEOUS at 22:01

## 2023-01-01 RX ADMIN — Medication 4 MILLIGRAM(S): at 17:15

## 2023-01-01 RX ADMIN — Medication 4 MILLIGRAM(S): at 05:13

## 2023-01-01 RX ADMIN — NYSTATIN CREAM 1 APPLICATION(S): 100000 CREAM TOPICAL at 21:30

## 2023-01-01 RX ADMIN — NYSTATIN CREAM 1 APPLICATION(S): 100000 CREAM TOPICAL at 13:58

## 2023-01-01 RX ADMIN — Medication 40 MILLIGRAM(S): at 17:28

## 2023-01-01 RX ADMIN — Medication 2 TABLET(S): at 23:30

## 2023-01-01 RX ADMIN — PANTOPRAZOLE SODIUM 40 MILLIGRAM(S): 20 TABLET, DELAYED RELEASE ORAL at 11:04

## 2023-01-01 RX ADMIN — Medication 300 MILLIGRAM(S): at 11:07

## 2023-01-01 RX ADMIN — SODIUM CHLORIDE 100 MILLILITER(S): 9 INJECTION, SOLUTION INTRAVENOUS at 23:07

## 2023-01-01 RX ADMIN — HEPARIN SODIUM 5000 UNIT(S): 5000 INJECTION INTRAVENOUS; SUBCUTANEOUS at 12:00

## 2023-01-01 RX ADMIN — Medication 400 MILLIGRAM(S): at 09:36

## 2023-01-01 RX ADMIN — Medication 4 MILLIGRAM(S): at 23:18

## 2023-01-01 RX ADMIN — Medication 5 MILLIGRAM(S): at 00:24

## 2023-01-01 RX ADMIN — ATORVASTATIN CALCIUM 20 MILLIGRAM(S): 80 TABLET, FILM COATED ORAL at 23:50

## 2023-01-01 RX ADMIN — URSODIOL 300 MILLIGRAM(S): 250 TABLET, FILM COATED ORAL at 21:11

## 2023-01-01 RX ADMIN — FLUCONAZOLE 100 MILLIGRAM(S): 150 TABLET ORAL at 00:01

## 2023-01-01 RX ADMIN — Medication 5 MILLIGRAM(S): at 11:52

## 2023-01-01 RX ADMIN — NYSTATIN CREAM 1 APPLICATION(S): 100000 CREAM TOPICAL at 23:30

## 2023-01-01 RX ADMIN — I.V. FAT EMULSION 41.67 GM/KG/DAY: 20 EMULSION INTRAVENOUS at 17:28

## 2023-01-01 RX ADMIN — Medication 5 MILLIGRAM(S): at 16:25

## 2023-01-01 RX ADMIN — HYDROMORPHONE HYDROCHLORIDE 0.5 MILLIGRAM(S): 2 INJECTION INTRAMUSCULAR; INTRAVENOUS; SUBCUTANEOUS at 20:00

## 2023-01-01 RX ADMIN — Medication 5 MILLIGRAM(S): at 06:33

## 2023-01-01 RX ADMIN — Medication 3 MILLILITER(S): at 00:19

## 2023-01-01 RX ADMIN — OCTREOTIDE ACETATE 100 MICROGRAM(S): 200 INJECTION, SOLUTION INTRAVENOUS; SUBCUTANEOUS at 15:51

## 2023-01-01 RX ADMIN — NYSTATIN CREAM 1 APPLICATION(S): 100000 CREAM TOPICAL at 06:09

## 2023-01-01 RX ADMIN — Medication 2 TABLET(S): at 23:17

## 2023-01-01 RX ADMIN — Medication 20 MILLIEQUIVALENT(S): at 08:34

## 2023-01-01 RX ADMIN — Medication 10 MILLIGRAM(S): at 20:18

## 2023-01-01 RX ADMIN — Medication 100 MILLIEQUIVALENT(S): at 10:29

## 2023-01-01 RX ADMIN — Medication 2 MILLIGRAM(S): at 17:59

## 2023-01-01 RX ADMIN — Medication 2 TABLET(S): at 12:22

## 2023-01-01 RX ADMIN — Medication 4 MILLIGRAM(S): at 06:10

## 2023-01-01 RX ADMIN — Medication 20 MILLIGRAM(S): at 09:44

## 2023-01-01 RX ADMIN — Medication 2 TABLET(S): at 18:36

## 2023-01-01 RX ADMIN — URSODIOL 300 MILLIGRAM(S): 250 TABLET, FILM COATED ORAL at 16:23

## 2023-01-01 RX ADMIN — HEPARIN SODIUM 5000 UNIT(S): 5000 INJECTION INTRAVENOUS; SUBCUTANEOUS at 13:57

## 2023-01-01 RX ADMIN — Medication 5 MILLIGRAM(S): at 06:52

## 2023-01-01 RX ADMIN — PANTOPRAZOLE SODIUM 40 MILLIGRAM(S): 20 TABLET, DELAYED RELEASE ORAL at 10:41

## 2023-01-01 RX ADMIN — I.V. FAT EMULSION 20.83 GM/KG/DAY: 20 EMULSION INTRAVENOUS at 17:36

## 2023-01-01 RX ADMIN — Medication 2 TABLET(S): at 18:17

## 2023-01-01 RX ADMIN — Medication 2 TABLET(S): at 21:19

## 2023-01-01 RX ADMIN — Medication 150 MILLIGRAM(S): at 06:01

## 2023-01-01 RX ADMIN — Medication 30 MICROGRAM(S): at 22:06

## 2023-01-01 RX ADMIN — Medication 5 MILLIGRAM(S): at 03:21

## 2023-01-01 RX ADMIN — HEPARIN SODIUM 5000 UNIT(S): 5000 INJECTION INTRAVENOUS; SUBCUTANEOUS at 13:44

## 2023-01-01 RX ADMIN — Medication 100 MILLIGRAM(S): at 10:04

## 2023-01-01 RX ADMIN — NYSTATIN CREAM 1 APPLICATION(S): 100000 CREAM TOPICAL at 05:32

## 2023-01-01 RX ADMIN — Medication 4 MILLIGRAM(S): at 00:48

## 2023-01-01 RX ADMIN — Medication 10 MILLIGRAM(S): at 13:51

## 2023-01-01 RX ADMIN — Medication 5 MILLIGRAM(S): at 23:18

## 2023-01-01 RX ADMIN — I.V. FAT EMULSION 20.83 GM/KG/DAY: 20 EMULSION INTRAVENOUS at 18:46

## 2023-01-01 RX ADMIN — Medication 2 TABLET(S): at 11:19

## 2023-01-01 RX ADMIN — Medication 2 TABLET(S): at 17:05

## 2023-01-01 RX ADMIN — Medication 2 TABLET(S): at 19:22

## 2023-01-01 RX ADMIN — Medication 150 MILLIGRAM(S): at 17:25

## 2023-01-01 RX ADMIN — Medication 40 MILLIEQUIVALENT(S): at 16:59

## 2023-01-01 RX ADMIN — SODIUM CHLORIDE 1000 MILLILITER(S): 9 INJECTION INTRAMUSCULAR; INTRAVENOUS; SUBCUTANEOUS at 15:36

## 2023-01-01 RX ADMIN — HEPARIN SODIUM 5000 UNIT(S): 5000 INJECTION INTRAVENOUS; SUBCUTANEOUS at 14:33

## 2023-01-01 RX ADMIN — Medication 100 MILLIEQUIVALENT(S): at 08:17

## 2023-01-01 RX ADMIN — Medication 4 MILLIGRAM(S): at 23:03

## 2023-01-01 RX ADMIN — CHOLESTYRAMINE 4 GRAM(S): 4 POWDER, FOR SUSPENSION ORAL at 09:51

## 2023-01-01 RX ADMIN — HEPARIN SODIUM 5000 UNIT(S): 5000 INJECTION INTRAVENOUS; SUBCUTANEOUS at 21:46

## 2023-01-01 RX ADMIN — HEPARIN SODIUM 5000 UNIT(S): 5000 INJECTION INTRAVENOUS; SUBCUTANEOUS at 15:28

## 2023-01-01 RX ADMIN — Medication 40 MICROGRAM(S): at 06:27

## 2023-01-01 RX ADMIN — MORPHINE 2 MILLIGRAM(S): 10 SOLUTION ORAL at 12:29

## 2023-01-01 RX ADMIN — Medication 5 MILLIGRAM(S): at 13:03

## 2023-01-01 RX ADMIN — Medication 25 MILLIGRAM(S): at 10:42

## 2023-01-01 RX ADMIN — Medication 40 MILLIGRAM(S): at 13:46

## 2023-01-01 RX ADMIN — Medication 2 TABLET(S): at 18:51

## 2023-01-01 RX ADMIN — URSODIOL 300 MILLIGRAM(S): 250 TABLET, FILM COATED ORAL at 22:16

## 2023-01-01 RX ADMIN — Medication 2 TABLET(S): at 18:01

## 2023-01-01 RX ADMIN — CHOLESTYRAMINE 4 GRAM(S): 4 POWDER, FOR SUSPENSION ORAL at 21:35

## 2023-01-01 RX ADMIN — Medication 4 MILLIGRAM(S): at 18:39

## 2023-01-01 RX ADMIN — MORPHINE 6 MILLIGRAM(S): 10 SOLUTION ORAL at 00:05

## 2023-01-01 RX ADMIN — Medication 1000 MILLIGRAM(S): at 17:31

## 2023-01-01 RX ADMIN — Medication 25 MILLIGRAM(S): at 05:19

## 2023-01-01 RX ADMIN — Medication 400 MILLIGRAM(S): at 23:26

## 2023-01-01 RX ADMIN — I.V. FAT EMULSION 20.83 GM/KG/DAY: 20 EMULSION INTRAVENOUS at 18:10

## 2023-01-01 RX ADMIN — Medication 5 MILLIGRAM(S): at 00:15

## 2023-01-01 RX ADMIN — I.V. FAT EMULSION 41.67 GM/KG/DAY: 20 EMULSION INTRAVENOUS at 17:12

## 2023-01-01 RX ADMIN — Medication 0.5 MILLIGRAM(S): at 02:34

## 2023-01-01 RX ADMIN — Medication 5 MILLIGRAM(S): at 11:02

## 2023-01-01 RX ADMIN — MORPHINE 2 MILLIGRAM(S): 10 SOLUTION ORAL at 23:04

## 2023-01-01 RX ADMIN — Medication 300 MILLIGRAM(S): at 10:30

## 2023-01-01 RX ADMIN — Medication 40 MICROGRAM(S): at 21:43

## 2023-01-01 RX ADMIN — SODIUM CHLORIDE 120 MILLILITER(S): 9 INJECTION, SOLUTION INTRAVENOUS at 13:48

## 2023-01-01 RX ADMIN — SODIUM CHLORIDE 500 MILLILITER(S): 9 INJECTION, SOLUTION INTRAVENOUS at 04:44

## 2023-01-01 RX ADMIN — AMIODARONE HYDROCHLORIDE 16.7 MG/MIN: 400 TABLET ORAL at 22:05

## 2023-01-01 RX ADMIN — HEPARIN SODIUM 5000 UNIT(S): 5000 INJECTION INTRAVENOUS; SUBCUTANEOUS at 21:18

## 2023-01-01 RX ADMIN — PANTOPRAZOLE SODIUM 40 MILLIGRAM(S): 20 TABLET, DELAYED RELEASE ORAL at 09:35

## 2023-01-01 RX ADMIN — CHLORHEXIDINE GLUCONATE 1 APPLICATION(S): 213 SOLUTION TOPICAL at 06:00

## 2023-01-01 RX ADMIN — ATORVASTATIN CALCIUM 20 MILLIGRAM(S): 80 TABLET, FILM COATED ORAL at 21:26

## 2023-01-01 RX ADMIN — I.V. FAT EMULSION 20.83 GM/KG/DAY: 20 EMULSION INTRAVENOUS at 17:22

## 2023-01-01 RX ADMIN — MORPHINE 6 MILLIGRAM(S): 10 SOLUTION ORAL at 12:52

## 2023-01-01 RX ADMIN — CEFEPIME 100 MILLIGRAM(S): 1 INJECTION, POWDER, FOR SOLUTION INTRAMUSCULAR; INTRAVENOUS at 00:06

## 2023-01-01 RX ADMIN — Medication 5 MILLIGRAM(S): at 17:38

## 2023-01-01 RX ADMIN — Medication 300 MILLIGRAM(S): at 12:41

## 2023-01-01 RX ADMIN — Medication 300 MILLIGRAM(S): at 12:59

## 2023-01-01 RX ADMIN — Medication 10 MILLIGRAM(S): at 16:26

## 2023-01-01 RX ADMIN — HYDROMORPHONE HYDROCHLORIDE 0.5 MILLIGRAM(S): 2 INJECTION INTRAMUSCULAR; INTRAVENOUS; SUBCUTANEOUS at 12:50

## 2023-01-01 RX ADMIN — DAPTOMYCIN 132 MILLIGRAM(S): 500 INJECTION, POWDER, LYOPHILIZED, FOR SOLUTION INTRAVENOUS at 18:01

## 2023-01-01 RX ADMIN — Medication 10 MILLIGRAM(S): at 10:14

## 2023-01-01 RX ADMIN — I.V. FAT EMULSION 20.83 GM/KG/DAY: 20 EMULSION INTRAVENOUS at 18:09

## 2023-01-01 RX ADMIN — Medication 25 MILLIGRAM(S): at 22:17

## 2023-01-01 RX ADMIN — Medication 25 MILLIGRAM(S): at 22:00

## 2023-01-01 RX ADMIN — HYDROMORPHONE HYDROCHLORIDE 0.5 MILLIGRAM(S): 2 INJECTION INTRAMUSCULAR; INTRAVENOUS; SUBCUTANEOUS at 16:00

## 2023-01-01 RX ADMIN — CHOLESTYRAMINE 4 GRAM(S): 4 POWDER, FOR SUSPENSION ORAL at 15:16

## 2023-01-01 RX ADMIN — ONDANSETRON 4 MILLIGRAM(S): 8 TABLET, FILM COATED ORAL at 10:04

## 2023-01-01 RX ADMIN — ONDANSETRON 4 MILLIGRAM(S): 8 TABLET, FILM COATED ORAL at 16:27

## 2023-01-01 RX ADMIN — OXYCODONE HYDROCHLORIDE 5 MILLIGRAM(S): 5 TABLET ORAL at 22:56

## 2023-01-01 RX ADMIN — Medication 2 TABLET(S): at 00:27

## 2023-01-01 RX ADMIN — Medication 10 MILLIGRAM(S): at 00:09

## 2023-01-01 RX ADMIN — URSODIOL 300 MILLIGRAM(S): 250 TABLET, FILM COATED ORAL at 05:04

## 2023-01-01 RX ADMIN — CHOLESTYRAMINE 4 GRAM(S): 4 POWDER, FOR SUSPENSION ORAL at 11:14

## 2023-01-01 RX ADMIN — URSODIOL 300 MILLIGRAM(S): 250 TABLET, FILM COATED ORAL at 14:45

## 2023-01-01 RX ADMIN — URSODIOL 300 MILLIGRAM(S): 250 TABLET, FILM COATED ORAL at 14:21

## 2023-01-01 RX ADMIN — Medication 40 MICROGRAM(S): at 22:00

## 2023-01-01 RX ADMIN — Medication 10 MILLIGRAM(S): at 17:27

## 2023-01-01 RX ADMIN — URSODIOL 300 MILLIGRAM(S): 250 TABLET, FILM COATED ORAL at 05:13

## 2023-01-01 RX ADMIN — Medication 40 MICROGRAM(S): at 06:30

## 2023-01-01 RX ADMIN — AMIODARONE HYDROCHLORIDE 200 MILLIGRAM(S): 400 TABLET ORAL at 05:00

## 2023-01-01 RX ADMIN — Medication 100 MILLIGRAM(S): at 06:41

## 2023-01-01 RX ADMIN — Medication 5 MILLIGRAM(S): at 00:18

## 2023-01-01 RX ADMIN — HEPARIN SODIUM 5000 UNIT(S): 5000 INJECTION INTRAVENOUS; SUBCUTANEOUS at 15:01

## 2023-01-01 RX ADMIN — URSODIOL 300 MILLIGRAM(S): 250 TABLET, FILM COATED ORAL at 21:20

## 2023-01-01 RX ADMIN — CHLORHEXIDINE GLUCONATE 1 APPLICATION(S): 213 SOLUTION TOPICAL at 05:21

## 2023-01-01 RX ADMIN — ENOXAPARIN SODIUM 100 MILLIGRAM(S): 100 INJECTION SUBCUTANEOUS at 18:43

## 2023-01-01 RX ADMIN — Medication 40 MICROGRAM(S): at 21:53

## 2023-01-01 RX ADMIN — CHLORHEXIDINE GLUCONATE 1 APPLICATION(S): 213 SOLUTION TOPICAL at 05:52

## 2023-01-01 RX ADMIN — Medication 2 MILLIGRAM(S): at 05:10

## 2023-01-01 RX ADMIN — Medication 1 EACH: at 18:10

## 2023-01-01 RX ADMIN — Medication 100 MILLIEQUIVALENT(S): at 17:08

## 2023-01-01 RX ADMIN — Medication 4 MILLIGRAM(S): at 12:31

## 2023-01-01 RX ADMIN — Medication 4 MILLIGRAM(S): at 11:54

## 2023-01-01 RX ADMIN — Medication 4 MILLIGRAM(S): at 05:03

## 2023-01-01 RX ADMIN — CHOLESTYRAMINE 4 GRAM(S): 4 POWDER, FOR SUSPENSION ORAL at 16:26

## 2023-01-01 RX ADMIN — Medication 2: at 11:59

## 2023-01-01 RX ADMIN — IMIPENEM AND CILASTATIN 100 MILLIGRAM(S): 250; 250 INJECTION, POWDER, FOR SOLUTION INTRAVENOUS at 02:20

## 2023-01-01 RX ADMIN — Medication 15 MILLIGRAM(S): at 15:00

## 2023-01-01 RX ADMIN — Medication 10 MILLIGRAM(S): at 03:10

## 2023-01-01 RX ADMIN — PANTOPRAZOLE SODIUM 40 MILLIGRAM(S): 20 TABLET, DELAYED RELEASE ORAL at 12:37

## 2023-01-01 RX ADMIN — Medication 0.5 MILLIGRAM(S): at 22:52

## 2023-01-01 RX ADMIN — NYSTATIN CREAM 1 APPLICATION(S): 100000 CREAM TOPICAL at 15:29

## 2023-01-01 RX ADMIN — ENOXAPARIN SODIUM 90 MILLIGRAM(S): 100 INJECTION SUBCUTANEOUS at 17:28

## 2023-01-01 RX ADMIN — PANTOPRAZOLE SODIUM 40 MILLIGRAM(S): 20 TABLET, DELAYED RELEASE ORAL at 06:13

## 2023-01-01 RX ADMIN — MORPHINE 6 MILLIGRAM(S): 10 SOLUTION ORAL at 23:38

## 2023-01-01 RX ADMIN — Medication 1 EACH: at 17:16

## 2023-01-01 RX ADMIN — URSODIOL 300 MILLIGRAM(S): 250 TABLET, FILM COATED ORAL at 22:44

## 2023-01-01 RX ADMIN — NYSTATIN CREAM 1 APPLICATION(S): 100000 CREAM TOPICAL at 15:10

## 2023-01-01 RX ADMIN — IMIPENEM AND CILASTATIN 250 MILLIGRAM(S): 250; 250 INJECTION, POWDER, FOR SOLUTION INTRAVENOUS at 02:47

## 2023-01-01 RX ADMIN — Medication 4 MILLIGRAM(S): at 18:40

## 2023-01-01 RX ADMIN — Medication 5 MILLIGRAM(S): at 16:14

## 2023-01-01 RX ADMIN — CHLORHEXIDINE GLUCONATE 15 MILLILITER(S): 213 SOLUTION TOPICAL at 18:00

## 2023-01-01 RX ADMIN — Medication 4 MILLIGRAM(S): at 23:04

## 2023-01-01 RX ADMIN — NYSTATIN CREAM 1 APPLICATION(S): 100000 CREAM TOPICAL at 14:54

## 2023-01-01 RX ADMIN — SODIUM CHLORIDE 500 MILLILITER(S): 9 INJECTION INTRAMUSCULAR; INTRAVENOUS; SUBCUTANEOUS at 18:15

## 2023-01-01 RX ADMIN — HYDROMORPHONE HYDROCHLORIDE 0.5 MILLIGRAM(S): 2 INJECTION INTRAMUSCULAR; INTRAVENOUS; SUBCUTANEOUS at 12:57

## 2023-01-01 RX ADMIN — Medication 150 MILLIGRAM(S): at 17:02

## 2023-01-01 RX ADMIN — Medication 10 MILLIGRAM(S): at 12:14

## 2023-01-01 RX ADMIN — HEPARIN SODIUM 5000 UNIT(S): 5000 INJECTION INTRAVENOUS; SUBCUTANEOUS at 23:17

## 2023-01-01 RX ADMIN — PANTOPRAZOLE SODIUM 40 MILLIGRAM(S): 20 TABLET, DELAYED RELEASE ORAL at 06:21

## 2023-01-01 RX ADMIN — Medication 5 MILLIGRAM(S): at 00:17

## 2023-01-01 RX ADMIN — NYSTATIN CREAM 1 APPLICATION(S): 100000 CREAM TOPICAL at 06:28

## 2023-01-01 RX ADMIN — IMIPENEM AND CILASTATIN 100 MILLIGRAM(S): 250; 250 INJECTION, POWDER, FOR SOLUTION INTRAVENOUS at 01:14

## 2023-01-01 RX ADMIN — Medication 25 MILLILITER(S): at 13:50

## 2023-01-01 RX ADMIN — Medication 5 MILLIGRAM(S): at 17:31

## 2023-01-01 RX ADMIN — PANTOPRAZOLE SODIUM 40 MILLIGRAM(S): 20 TABLET, DELAYED RELEASE ORAL at 10:30

## 2023-01-01 RX ADMIN — Medication 5 MILLIGRAM(S): at 00:28

## 2023-01-01 RX ADMIN — PANTOPRAZOLE SODIUM 40 MILLIGRAM(S): 20 TABLET, DELAYED RELEASE ORAL at 09:36

## 2023-01-01 RX ADMIN — Medication 2: at 23:35

## 2023-01-01 RX ADMIN — CHOLESTYRAMINE 4 GRAM(S): 4 POWDER, FOR SUSPENSION ORAL at 15:05

## 2023-01-01 RX ADMIN — I.V. FAT EMULSION 41.67 GM/KG/DAY: 20 EMULSION INTRAVENOUS at 17:18

## 2023-01-01 RX ADMIN — Medication 4 MILLIGRAM(S): at 13:02

## 2023-01-01 RX ADMIN — Medication 4 MILLIGRAM(S): at 06:00

## 2023-01-01 RX ADMIN — Medication 4 MILLIGRAM(S): at 06:19

## 2023-01-01 RX ADMIN — Medication 30 MICROGRAM(S): at 00:14

## 2023-01-01 RX ADMIN — Medication 2 TABLET(S): at 18:44

## 2023-01-01 RX ADMIN — Medication 4 MILLIGRAM(S): at 17:09

## 2023-01-01 RX ADMIN — NYSTATIN CREAM 1 APPLICATION(S): 100000 CREAM TOPICAL at 22:11

## 2023-01-01 RX ADMIN — Medication 100 GRAM(S): at 20:48

## 2023-01-01 RX ADMIN — Medication 2 TABLET(S): at 11:52

## 2023-01-01 RX ADMIN — Medication 4 MILLIGRAM(S): at 17:48

## 2023-01-01 RX ADMIN — CHOLESTYRAMINE 4 GRAM(S): 4 POWDER, FOR SUSPENSION ORAL at 21:43

## 2023-01-01 RX ADMIN — Medication 4 MILLIGRAM(S): at 23:08

## 2023-01-01 RX ADMIN — Medication 2 MILLIGRAM(S): at 09:29

## 2023-01-01 RX ADMIN — Medication 4 MILLIGRAM(S): at 00:36

## 2023-01-01 RX ADMIN — SODIUM CHLORIDE 1000 MILLILITER(S): 9 INJECTION, SOLUTION INTRAVENOUS at 00:40

## 2023-01-01 RX ADMIN — Medication 40 MICROGRAM(S): at 22:33

## 2023-01-01 RX ADMIN — Medication 1 EACH: at 17:01

## 2023-01-01 RX ADMIN — Medication 2 TABLET(S): at 17:02

## 2023-01-01 RX ADMIN — Medication 30 MICROGRAM(S): at 21:33

## 2023-01-01 RX ADMIN — LOSARTAN POTASSIUM 25 MILLIGRAM(S): 100 TABLET, FILM COATED ORAL at 06:14

## 2023-01-01 RX ADMIN — HYDROMORPHONE HYDROCHLORIDE 1 MILLIGRAM(S): 2 INJECTION INTRAMUSCULAR; INTRAVENOUS; SUBCUTANEOUS at 17:15

## 2023-01-01 RX ADMIN — CASPOFUNGIN ACETATE 260 MILLIGRAM(S): 7 INJECTION, POWDER, LYOPHILIZED, FOR SOLUTION INTRAVENOUS at 11:06

## 2023-01-01 RX ADMIN — CEFEPIME 100 MILLIGRAM(S): 1 INJECTION, POWDER, FOR SOLUTION INTRAMUSCULAR; INTRAVENOUS at 09:23

## 2023-01-01 RX ADMIN — CHOLESTYRAMINE 4 GRAM(S): 4 POWDER, FOR SUSPENSION ORAL at 21:29

## 2023-01-01 RX ADMIN — Medication 40 MICROGRAM(S): at 09:00

## 2023-01-01 RX ADMIN — Medication 5 MILLIGRAM(S): at 03:22

## 2023-01-01 RX ADMIN — CHOLESTYRAMINE 4 GRAM(S): 4 POWDER, FOR SUSPENSION ORAL at 09:50

## 2023-01-01 RX ADMIN — URSODIOL 300 MILLIGRAM(S): 250 TABLET, FILM COATED ORAL at 06:12

## 2023-01-01 RX ADMIN — Medication 2 TABLET(S): at 23:19

## 2023-01-01 RX ADMIN — NYSTATIN CREAM 1 APPLICATION(S): 100000 CREAM TOPICAL at 05:15

## 2023-01-01 RX ADMIN — CHLORHEXIDINE GLUCONATE 1 APPLICATION(S): 213 SOLUTION TOPICAL at 05:24

## 2023-01-01 RX ADMIN — MORPHINE 6 MILLIGRAM(S): 10 SOLUTION ORAL at 17:38

## 2023-01-01 RX ADMIN — Medication 65 MILLILITER(S): at 14:55

## 2023-01-01 RX ADMIN — Medication 10 MILLIGRAM(S): at 17:00

## 2023-01-01 RX ADMIN — Medication 1 EACH: at 17:23

## 2023-01-01 RX ADMIN — LOSARTAN POTASSIUM 25 MILLIGRAM(S): 100 TABLET, FILM COATED ORAL at 07:36

## 2023-01-01 RX ADMIN — OCTREOTIDE ACETATE 100 MICROGRAM(S): 200 INJECTION, SOLUTION INTRAVENOUS; SUBCUTANEOUS at 02:03

## 2023-01-01 RX ADMIN — LIDOCAINE 1 PATCH: 4 CREAM TOPICAL at 11:33

## 2023-01-01 RX ADMIN — Medication 50 MILLILITER(S): at 06:25

## 2023-01-01 RX ADMIN — I.V. FAT EMULSION 41.67 GM/KG/DAY: 20 EMULSION INTRAVENOUS at 17:49

## 2023-01-01 RX ADMIN — OCTREOTIDE ACETATE 100 MICROGRAM(S): 200 INJECTION, SOLUTION INTRAVENOUS; SUBCUTANEOUS at 16:37

## 2023-01-01 RX ADMIN — PANTOPRAZOLE SODIUM 40 MILLIGRAM(S): 20 TABLET, DELAYED RELEASE ORAL at 12:07

## 2023-01-01 RX ADMIN — PANTOPRAZOLE SODIUM 40 MILLIGRAM(S): 20 TABLET, DELAYED RELEASE ORAL at 21:44

## 2023-01-01 RX ADMIN — Medication 5 MILLIGRAM(S): at 23:51

## 2023-01-01 RX ADMIN — HEPARIN SODIUM 5000 UNIT(S): 5000 INJECTION INTRAVENOUS; SUBCUTANEOUS at 21:30

## 2023-01-01 RX ADMIN — CHLORHEXIDINE GLUCONATE 1 APPLICATION(S): 213 SOLUTION TOPICAL at 06:35

## 2023-01-01 RX ADMIN — HEPARIN SODIUM 5000 UNIT(S): 5000 INJECTION INTRAVENOUS; SUBCUTANEOUS at 05:28

## 2023-01-01 RX ADMIN — Medication 300 MILLIGRAM(S): at 10:29

## 2023-01-01 RX ADMIN — PANTOPRAZOLE SODIUM 40 MILLIGRAM(S): 20 TABLET, DELAYED RELEASE ORAL at 10:11

## 2023-01-01 RX ADMIN — Medication 25 MILLIGRAM(S): at 10:08

## 2023-01-01 RX ADMIN — NYSTATIN CREAM 1 APPLICATION(S): 100000 CREAM TOPICAL at 06:35

## 2023-01-01 RX ADMIN — CHLORHEXIDINE GLUCONATE 1 APPLICATION(S): 213 SOLUTION TOPICAL at 03:55

## 2023-01-01 RX ADMIN — Medication 250 MILLIGRAM(S): at 03:48

## 2023-01-01 RX ADMIN — Medication 62.5 MILLIMOLE(S): at 10:15

## 2023-01-01 RX ADMIN — Medication 25 MILLIGRAM(S): at 21:34

## 2023-01-01 RX ADMIN — Medication 100 MILLIEQUIVALENT(S): at 11:23

## 2023-01-01 RX ADMIN — Medication 1 EACH: at 17:46

## 2023-01-01 RX ADMIN — CHLORHEXIDINE GLUCONATE 15 MILLILITER(S): 213 SOLUTION TOPICAL at 05:44

## 2023-01-01 RX ADMIN — Medication 150 MILLIGRAM(S): at 17:07

## 2023-01-01 RX ADMIN — Medication 5 MILLIGRAM(S): at 04:18

## 2023-01-01 RX ADMIN — Medication 100 MILLIGRAM(S): at 14:11

## 2023-01-01 RX ADMIN — Medication 10 MILLIGRAM(S): at 05:29

## 2023-01-01 RX ADMIN — CHOLESTYRAMINE 4 GRAM(S): 4 POWDER, FOR SUSPENSION ORAL at 22:29

## 2023-01-01 RX ADMIN — URSODIOL 300 MILLIGRAM(S): 250 TABLET, FILM COATED ORAL at 22:43

## 2023-01-01 RX ADMIN — Medication 10 MILLIGRAM(S): at 23:50

## 2023-01-01 RX ADMIN — PANTOPRAZOLE SODIUM 40 MILLIGRAM(S): 20 TABLET, DELAYED RELEASE ORAL at 12:59

## 2023-01-01 RX ADMIN — ONDANSETRON 4 MILLIGRAM(S): 8 TABLET, FILM COATED ORAL at 15:17

## 2023-01-01 RX ADMIN — HEPARIN SODIUM 5000 UNIT(S): 5000 INJECTION INTRAVENOUS; SUBCUTANEOUS at 21:52

## 2023-01-01 RX ADMIN — Medication 1000 MILLIGRAM(S): at 02:40

## 2023-01-01 RX ADMIN — OCTREOTIDE ACETATE 100 MICROGRAM(S): 200 INJECTION, SOLUTION INTRAVENOUS; SUBCUTANEOUS at 00:27

## 2023-01-01 RX ADMIN — Medication 4 MILLIGRAM(S): at 06:26

## 2023-01-01 RX ADMIN — LOSARTAN POTASSIUM 25 MILLIGRAM(S): 100 TABLET, FILM COATED ORAL at 05:43

## 2023-01-01 RX ADMIN — Medication 2 TABLET(S): at 19:04

## 2023-01-01 RX ADMIN — Medication 50 MILLIGRAM(S): at 05:44

## 2023-01-01 RX ADMIN — HYDROMORPHONE HYDROCHLORIDE 0.5 MILLIGRAM(S): 2 INJECTION INTRAMUSCULAR; INTRAVENOUS; SUBCUTANEOUS at 08:30

## 2023-01-01 RX ADMIN — Medication 100 MILLIGRAM(S): at 13:45

## 2023-01-01 RX ADMIN — Medication 5 MILLIGRAM(S): at 21:27

## 2023-01-01 RX ADMIN — Medication 5 MILLIGRAM(S): at 09:51

## 2023-01-01 RX ADMIN — Medication 150 MILLIGRAM(S): at 06:20

## 2023-01-01 RX ADMIN — Medication 10 MILLIGRAM(S): at 07:43

## 2023-01-01 RX ADMIN — CHLORHEXIDINE GLUCONATE 1 APPLICATION(S): 213 SOLUTION TOPICAL at 07:08

## 2023-01-01 RX ADMIN — HEPARIN SODIUM 5000 UNIT(S): 5000 INJECTION INTRAVENOUS; SUBCUTANEOUS at 15:22

## 2023-01-01 RX ADMIN — CHLORHEXIDINE GLUCONATE 1 APPLICATION(S): 213 SOLUTION TOPICAL at 06:03

## 2023-01-01 RX ADMIN — ONDANSETRON 4 MILLIGRAM(S): 8 TABLET, FILM COATED ORAL at 12:23

## 2023-01-01 RX ADMIN — LOSARTAN POTASSIUM 25 MILLIGRAM(S): 100 TABLET, FILM COATED ORAL at 05:10

## 2023-01-01 RX ADMIN — DAPTOMYCIN 132 MILLIGRAM(S): 500 INJECTION, POWDER, LYOPHILIZED, FOR SOLUTION INTRAVENOUS at 11:59

## 2023-01-01 RX ADMIN — Medication 2: at 06:12

## 2023-01-01 RX ADMIN — I.V. FAT EMULSION 20.83 GM/KG/DAY: 20 EMULSION INTRAVENOUS at 17:48

## 2023-01-01 RX ADMIN — I.V. FAT EMULSION 20.83 GM/KG/DAY: 20 EMULSION INTRAVENOUS at 17:49

## 2023-01-01 RX ADMIN — Medication 1 EACH: at 18:50

## 2023-01-01 RX ADMIN — Medication 25 MILLIGRAM(S): at 05:29

## 2023-01-01 RX ADMIN — HEPARIN SODIUM 5000 UNIT(S): 5000 INJECTION INTRAVENOUS; SUBCUTANEOUS at 07:29

## 2023-01-01 RX ADMIN — URSODIOL 300 MILLIGRAM(S): 250 TABLET, FILM COATED ORAL at 14:11

## 2023-01-01 RX ADMIN — AMLODIPINE BESYLATE 10 MILLIGRAM(S): 2.5 TABLET ORAL at 17:21

## 2023-01-01 RX ADMIN — SCOPALAMINE 1 PATCH: 1 PATCH, EXTENDED RELEASE TRANSDERMAL at 06:52

## 2023-01-01 RX ADMIN — Medication 5 MILLIGRAM(S): at 11:53

## 2023-01-01 RX ADMIN — Medication 4 MILLIGRAM(S): at 18:32

## 2023-01-01 RX ADMIN — Medication 300 MILLIGRAM(S): at 13:36

## 2023-01-01 RX ADMIN — HYDROMORPHONE HYDROCHLORIDE 0.5 MILLIGRAM(S): 2 INJECTION INTRAMUSCULAR; INTRAVENOUS; SUBCUTANEOUS at 01:45

## 2023-01-01 RX ADMIN — Medication 10 MILLIGRAM(S): at 05:26

## 2023-01-01 RX ADMIN — NYSTATIN CREAM 1 APPLICATION(S): 100000 CREAM TOPICAL at 14:49

## 2023-01-01 RX ADMIN — Medication 4 MILLIGRAM(S): at 17:52

## 2023-01-01 RX ADMIN — Medication 65 MILLILITER(S): at 03:00

## 2023-01-01 RX ADMIN — Medication 100 MILLIGRAM(S): at 13:54

## 2023-01-01 RX ADMIN — HYDROMORPHONE HYDROCHLORIDE 0.5 MILLIGRAM(S): 2 INJECTION INTRAMUSCULAR; INTRAVENOUS; SUBCUTANEOUS at 08:47

## 2023-01-01 RX ADMIN — Medication 100 MILLIGRAM(S): at 06:46

## 2023-01-01 RX ADMIN — Medication 4 MILLIGRAM(S): at 19:15

## 2023-01-01 RX ADMIN — HEPARIN SODIUM 5000 UNIT(S): 5000 INJECTION INTRAVENOUS; SUBCUTANEOUS at 05:01

## 2023-01-01 RX ADMIN — Medication 50 MICROGRAM(S): at 05:01

## 2023-01-01 RX ADMIN — CHLORHEXIDINE GLUCONATE 15 MILLILITER(S): 213 SOLUTION TOPICAL at 21:51

## 2023-01-01 RX ADMIN — Medication 10 MILLIGRAM(S): at 00:05

## 2023-01-01 RX ADMIN — CHOLESTYRAMINE 4 GRAM(S): 4 POWDER, FOR SUSPENSION ORAL at 21:09

## 2023-01-01 RX ADMIN — Medication 300 MILLIGRAM(S): at 10:09

## 2023-01-01 RX ADMIN — Medication 50 MICROGRAM(S): at 07:08

## 2023-01-01 RX ADMIN — CHLORHEXIDINE GLUCONATE 1 APPLICATION(S): 213 SOLUTION TOPICAL at 05:19

## 2023-01-01 RX ADMIN — LOSARTAN POTASSIUM 25 MILLIGRAM(S): 100 TABLET, FILM COATED ORAL at 10:04

## 2023-01-01 RX ADMIN — Medication 40 MICROGRAM(S): at 22:23

## 2023-01-01 RX ADMIN — SCOPALAMINE 1 PATCH: 1 PATCH, EXTENDED RELEASE TRANSDERMAL at 21:51

## 2023-01-01 RX ADMIN — I.V. FAT EMULSION 41.67 GM/KG/DAY: 20 EMULSION INTRAVENOUS at 17:24

## 2023-01-01 RX ADMIN — Medication 300 MILLIGRAM(S): at 10:07

## 2023-01-01 RX ADMIN — Medication 100 MILLIGRAM(S): at 05:30

## 2023-01-01 RX ADMIN — Medication 5 MILLIGRAM(S): at 15:25

## 2023-01-01 RX ADMIN — CHOLESTYRAMINE 4 GRAM(S): 4 POWDER, FOR SUSPENSION ORAL at 16:18

## 2023-01-01 RX ADMIN — Medication 65 MILLILITER(S): at 01:29

## 2023-01-01 RX ADMIN — NYSTATIN CREAM 1 APPLICATION(S): 100000 CREAM TOPICAL at 21:46

## 2023-01-01 RX ADMIN — Medication 4 MILLIGRAM(S): at 23:33

## 2023-01-01 RX ADMIN — URSODIOL 300 MILLIGRAM(S): 250 TABLET, FILM COATED ORAL at 06:49

## 2023-01-01 RX ADMIN — Medication 40 MILLIGRAM(S): at 09:35

## 2023-01-01 RX ADMIN — Medication 4 MILLIGRAM(S): at 17:39

## 2023-01-01 RX ADMIN — IMIPENEM AND CILASTATIN 100 MILLIGRAM(S): 250; 250 INJECTION, POWDER, FOR SOLUTION INTRAVENOUS at 17:02

## 2023-01-01 RX ADMIN — Medication 30 MICROGRAM(S): at 22:51

## 2023-01-01 RX ADMIN — Medication 5 MILLIGRAM(S): at 00:25

## 2023-01-01 RX ADMIN — MORPHINE 6 MILLIGRAM(S): 10 SOLUTION ORAL at 11:16

## 2023-01-01 RX ADMIN — HEPARIN SODIUM 5000 UNIT(S): 5000 INJECTION INTRAVENOUS; SUBCUTANEOUS at 21:55

## 2023-01-01 RX ADMIN — Medication 5 MILLIGRAM(S): at 04:44

## 2023-01-01 RX ADMIN — SODIUM CHLORIDE 110 MILLILITER(S): 9 INJECTION, SOLUTION INTRAVENOUS at 19:45

## 2023-01-01 RX ADMIN — URSODIOL 300 MILLIGRAM(S): 250 TABLET, FILM COATED ORAL at 07:58

## 2023-01-01 RX ADMIN — HEPARIN SODIUM 5000 UNIT(S): 5000 INJECTION INTRAVENOUS; SUBCUTANEOUS at 22:55

## 2023-01-01 RX ADMIN — PANTOPRAZOLE SODIUM 40 MILLIGRAM(S): 20 TABLET, DELAYED RELEASE ORAL at 10:02

## 2023-01-01 RX ADMIN — Medication 125 MILLILITER(S): at 12:48

## 2023-01-01 RX ADMIN — Medication 5 MILLIGRAM(S): at 23:10

## 2023-01-01 RX ADMIN — Medication 150 MILLIGRAM(S): at 11:57

## 2023-01-01 RX ADMIN — Medication 1 EACH: at 17:18

## 2023-01-01 RX ADMIN — Medication 5 MILLIGRAM(S): at 17:14

## 2023-01-01 RX ADMIN — LOSARTAN POTASSIUM 25 MILLIGRAM(S): 100 TABLET, FILM COATED ORAL at 06:01

## 2023-01-01 RX ADMIN — MORPHINE 2 MILLIGRAM(S): 10 SOLUTION ORAL at 11:57

## 2023-01-01 RX ADMIN — AMLODIPINE BESYLATE 5 MILLIGRAM(S): 2.5 TABLET ORAL at 06:31

## 2023-01-01 RX ADMIN — OCTREOTIDE ACETATE 100 MICROGRAM(S): 200 INJECTION, SOLUTION INTRAVENOUS; SUBCUTANEOUS at 13:51

## 2023-01-01 RX ADMIN — Medication 5 MILLIGRAM(S): at 00:37

## 2023-01-01 RX ADMIN — CEFTRIAXONE 100 MILLIGRAM(S): 500 INJECTION, POWDER, FOR SOLUTION INTRAMUSCULAR; INTRAVENOUS at 21:06

## 2023-01-01 RX ADMIN — CASPOFUNGIN ACETATE 260 MILLIGRAM(S): 7 INJECTION, POWDER, LYOPHILIZED, FOR SOLUTION INTRAVENOUS at 13:52

## 2023-01-01 RX ADMIN — Medication 25 MILLIGRAM(S): at 21:26

## 2023-01-01 RX ADMIN — AMIODARONE HYDROCHLORIDE 16.7 MG/MIN: 400 TABLET ORAL at 05:30

## 2023-01-01 RX ADMIN — HEPARIN SODIUM 5000 UNIT(S): 5000 INJECTION INTRAVENOUS; SUBCUTANEOUS at 21:05

## 2023-01-01 RX ADMIN — CHOLESTYRAMINE 4 GRAM(S): 4 POWDER, FOR SUSPENSION ORAL at 21:48

## 2023-01-01 RX ADMIN — CHLORHEXIDINE GLUCONATE 1 APPLICATION(S): 213 SOLUTION TOPICAL at 06:41

## 2023-01-01 RX ADMIN — Medication 65 MILLILITER(S): at 13:00

## 2023-01-01 RX ADMIN — SODIUM CHLORIDE 1000 MILLILITER(S): 9 INJECTION, SOLUTION INTRAVENOUS at 15:25

## 2023-01-01 RX ADMIN — IMIPENEM AND CILASTATIN 250 MILLIGRAM(S): 250; 250 INJECTION, POWDER, FOR SOLUTION INTRAVENOUS at 02:15

## 2023-01-01 RX ADMIN — AMIODARONE HYDROCHLORIDE 16.7 MG/MIN: 400 TABLET ORAL at 11:00

## 2023-01-01 RX ADMIN — MEROPENEM 100 MILLIGRAM(S): 1 INJECTION INTRAVENOUS at 14:34

## 2023-01-01 RX ADMIN — Medication 40 MICROGRAM(S): at 21:25

## 2023-01-01 RX ADMIN — OCTREOTIDE ACETATE 100 MICROGRAM(S): 200 INJECTION, SOLUTION INTRAVENOUS; SUBCUTANEOUS at 13:04

## 2023-01-01 RX ADMIN — Medication 5 MILLIGRAM(S): at 05:39

## 2023-01-01 RX ADMIN — MORPHINE 6 MILLIGRAM(S): 10 SOLUTION ORAL at 19:46

## 2023-01-01 RX ADMIN — HEPARIN SODIUM 5000 UNIT(S): 5000 INJECTION INTRAVENOUS; SUBCUTANEOUS at 13:34

## 2023-01-01 RX ADMIN — Medication 5 MILLIGRAM(S): at 17:29

## 2023-01-01 RX ADMIN — ENOXAPARIN SODIUM 90 MILLIGRAM(S): 100 INJECTION SUBCUTANEOUS at 05:13

## 2023-01-01 RX ADMIN — HEPARIN SODIUM 5000 UNIT(S): 5000 INJECTION INTRAVENOUS; SUBCUTANEOUS at 11:17

## 2023-01-01 RX ADMIN — NYSTATIN CREAM 1 APPLICATION(S): 100000 CREAM TOPICAL at 05:54

## 2023-01-01 RX ADMIN — Medication 4 MILLIGRAM(S): at 00:12

## 2023-01-01 RX ADMIN — Medication 10 MILLIGRAM(S): at 16:03

## 2023-01-01 RX ADMIN — CHOLESTYRAMINE 4 GRAM(S): 4 POWDER, FOR SUSPENSION ORAL at 16:00

## 2023-01-01 RX ADMIN — Medication 5 MILLIGRAM(S): at 19:20

## 2023-01-01 RX ADMIN — URSODIOL 300 MILLIGRAM(S): 250 TABLET, FILM COATED ORAL at 21:22

## 2023-01-01 RX ADMIN — SODIUM CHLORIDE 50 MILLILITER(S): 5 INJECTION, SOLUTION INTRAVENOUS at 19:27

## 2023-01-01 RX ADMIN — HEPARIN SODIUM 5000 UNIT(S): 5000 INJECTION INTRAVENOUS; SUBCUTANEOUS at 13:45

## 2023-01-01 RX ADMIN — Medication 250 MILLIGRAM(S): at 03:30

## 2023-01-01 RX ADMIN — Medication 25 MILLIGRAM(S): at 09:19

## 2023-01-01 RX ADMIN — Medication 40 MICROGRAM(S): at 07:01

## 2023-01-01 RX ADMIN — FAMOTIDINE 20 MILLIGRAM(S): 10 INJECTION INTRAVENOUS at 12:33

## 2023-01-01 RX ADMIN — CEFEPIME 100 MILLIGRAM(S): 1 INJECTION, POWDER, FOR SOLUTION INTRAMUSCULAR; INTRAVENOUS at 00:44

## 2023-01-01 RX ADMIN — Medication 40 MICROGRAM(S): at 22:29

## 2023-01-01 RX ADMIN — CHOLESTYRAMINE 4 GRAM(S): 4 POWDER, FOR SUSPENSION ORAL at 17:30

## 2023-01-01 RX ADMIN — Medication 4 MILLIGRAM(S): at 12:46

## 2023-01-01 RX ADMIN — Medication 5 MILLIGRAM(S): at 11:44

## 2023-01-01 RX ADMIN — Medication 5 MILLIGRAM(S): at 05:52

## 2023-01-01 RX ADMIN — PANTOPRAZOLE SODIUM 40 MILLIGRAM(S): 20 TABLET, DELAYED RELEASE ORAL at 11:01

## 2023-01-01 RX ADMIN — OCTREOTIDE ACETATE 100 MICROGRAM(S): 200 INJECTION, SOLUTION INTRAVENOUS; SUBCUTANEOUS at 23:08

## 2023-01-01 RX ADMIN — Medication 50 MILLILITER(S): at 23:00

## 2023-01-01 RX ADMIN — Medication 10 MILLIGRAM(S): at 03:11

## 2023-01-01 RX ADMIN — Medication 10 MILLIGRAM(S): at 04:55

## 2023-01-01 RX ADMIN — CHOLESTYRAMINE 4 GRAM(S): 4 POWDER, FOR SUSPENSION ORAL at 17:02

## 2023-01-01 RX ADMIN — I.V. FAT EMULSION 41.67 GM/KG/DAY: 20 EMULSION INTRAVENOUS at 17:42

## 2023-01-01 RX ADMIN — MORPHINE 6 MILLIGRAM(S): 10 SOLUTION ORAL at 12:24

## 2023-01-01 RX ADMIN — AMIODARONE HYDROCHLORIDE 200 MILLIGRAM(S): 400 TABLET ORAL at 05:53

## 2023-01-01 RX ADMIN — Medication 4 MILLIGRAM(S): at 07:48

## 2023-01-01 RX ADMIN — Medication 100 MILLIGRAM(S): at 22:42

## 2023-01-01 RX ADMIN — Medication 40 MICROGRAM(S): at 06:46

## 2023-01-01 RX ADMIN — SODIUM CHLORIDE 1000 MILLILITER(S): 9 INJECTION, SOLUTION INTRAVENOUS at 06:24

## 2023-01-01 RX ADMIN — Medication 150 MILLIGRAM(S): at 05:20

## 2023-01-01 RX ADMIN — OCTREOTIDE ACETATE 100 MICROGRAM(S): 200 INJECTION, SOLUTION INTRAVENOUS; SUBCUTANEOUS at 00:19

## 2023-01-01 RX ADMIN — Medication 2 TABLET(S): at 05:53

## 2023-01-01 RX ADMIN — Medication 5 MILLIGRAM(S): at 12:02

## 2023-01-01 RX ADMIN — PANTOPRAZOLE SODIUM 40 MILLIGRAM(S): 20 TABLET, DELAYED RELEASE ORAL at 11:26

## 2023-01-01 RX ADMIN — Medication 5 MILLIGRAM(S): at 10:13

## 2023-01-01 RX ADMIN — Medication 25 MILLIGRAM(S): at 05:18

## 2023-01-01 RX ADMIN — Medication 300 MILLIGRAM(S): at 11:09

## 2023-01-01 RX ADMIN — CHOLESTYRAMINE 4 GRAM(S): 4 POWDER, FOR SUSPENSION ORAL at 10:42

## 2023-01-01 RX ADMIN — NYSTATIN CREAM 1 APPLICATION(S): 100000 CREAM TOPICAL at 14:22

## 2023-01-01 RX ADMIN — Medication 25 MILLIGRAM(S): at 18:10

## 2023-01-01 RX ADMIN — PROPOFOL 6.8 MICROGRAM(S)/KG/MIN: 10 INJECTION, EMULSION INTRAVENOUS at 22:16

## 2023-01-01 RX ADMIN — ONDANSETRON 4 MILLIGRAM(S): 8 TABLET, FILM COATED ORAL at 10:50

## 2023-01-01 RX ADMIN — ONDANSETRON 4 MILLIGRAM(S): 8 TABLET, FILM COATED ORAL at 19:21

## 2023-01-01 RX ADMIN — CHLORHEXIDINE GLUCONATE 15 MILLILITER(S): 213 SOLUTION TOPICAL at 05:36

## 2023-01-01 RX ADMIN — CHOLESTYRAMINE 4 GRAM(S): 4 POWDER, FOR SUSPENSION ORAL at 17:27

## 2023-01-01 RX ADMIN — Medication 40 MICROGRAM(S): at 21:36

## 2023-01-01 RX ADMIN — Medication 40 MICROGRAM(S): at 21:41

## 2023-01-01 RX ADMIN — HYDROMORPHONE HYDROCHLORIDE 0.2 MILLIGRAM(S): 2 INJECTION INTRAMUSCULAR; INTRAVENOUS; SUBCUTANEOUS at 01:44

## 2023-01-01 RX ADMIN — CHOLESTYRAMINE 4 GRAM(S): 4 POWDER, FOR SUSPENSION ORAL at 16:07

## 2023-01-01 RX ADMIN — Medication 4 MILLIGRAM(S): at 13:29

## 2023-01-01 RX ADMIN — NYSTATIN CREAM 1 APPLICATION(S): 100000 CREAM TOPICAL at 15:14

## 2023-01-01 RX ADMIN — CEFEPIME 100 MILLIGRAM(S): 1 INJECTION, POWDER, FOR SOLUTION INTRAMUSCULAR; INTRAVENOUS at 08:17

## 2023-01-01 RX ADMIN — Medication 5 MILLIGRAM(S): at 22:40

## 2023-01-01 RX ADMIN — PANTOPRAZOLE SODIUM 40 MILLIGRAM(S): 20 TABLET, DELAYED RELEASE ORAL at 10:58

## 2023-01-01 RX ADMIN — Medication 40 MICROGRAM(S): at 22:22

## 2023-01-01 RX ADMIN — AMIODARONE HYDROCHLORIDE 200 MILLIGRAM(S): 400 TABLET ORAL at 08:10

## 2023-01-01 RX ADMIN — CHOLESTYRAMINE 4 GRAM(S): 4 POWDER, FOR SUSPENSION ORAL at 21:31

## 2023-01-01 RX ADMIN — Medication 2 TABLET(S): at 05:30

## 2023-01-01 RX ADMIN — Medication 300 MILLIGRAM(S): at 12:21

## 2023-01-01 RX ADMIN — CHOLESTYRAMINE 4 GRAM(S): 4 POWDER, FOR SUSPENSION ORAL at 21:10

## 2023-01-01 RX ADMIN — Medication 2 TABLET(S): at 11:27

## 2023-01-01 RX ADMIN — Medication 100 MILLIEQUIVALENT(S): at 07:37

## 2023-01-01 RX ADMIN — Medication 4 MILLIGRAM(S): at 06:56

## 2023-01-01 RX ADMIN — MORPHINE 6 MILLIGRAM(S): 10 SOLUTION ORAL at 11:40

## 2023-01-01 RX ADMIN — ENOXAPARIN SODIUM 100 MILLIGRAM(S): 100 INJECTION SUBCUTANEOUS at 18:39

## 2023-01-01 RX ADMIN — Medication 1 EACH: at 17:36

## 2023-01-01 RX ADMIN — ENOXAPARIN SODIUM 90 MILLIGRAM(S): 100 INJECTION SUBCUTANEOUS at 06:27

## 2023-01-01 RX ADMIN — Medication 5 MILLIGRAM(S): at 03:15

## 2023-01-01 RX ADMIN — Medication 0.5 MILLIGRAM(S): at 00:58

## 2023-01-01 RX ADMIN — IMIPENEM AND CILASTATIN 250 MILLIGRAM(S): 250; 250 INJECTION, POWDER, FOR SOLUTION INTRAVENOUS at 18:09

## 2023-01-01 RX ADMIN — IMIPENEM AND CILASTATIN 250 MILLIGRAM(S): 250; 250 INJECTION, POWDER, FOR SOLUTION INTRAVENOUS at 06:34

## 2023-01-01 RX ADMIN — SODIUM CHLORIDE 50 MILLILITER(S): 5 INJECTION, SOLUTION INTRAVENOUS at 00:53

## 2023-01-01 RX ADMIN — HYDROMORPHONE HYDROCHLORIDE 0.5 MILLIGRAM(S): 2 INJECTION INTRAMUSCULAR; INTRAVENOUS; SUBCUTANEOUS at 20:30

## 2023-01-01 RX ADMIN — Medication 4 MILLIGRAM(S): at 05:02

## 2023-01-01 RX ADMIN — I.V. FAT EMULSION 41.67 GM/KG/DAY: 20 EMULSION INTRAVENOUS at 17:25

## 2023-01-01 RX ADMIN — NYSTATIN CREAM 1 APPLICATION(S): 100000 CREAM TOPICAL at 05:35

## 2023-01-01 RX ADMIN — CHLORHEXIDINE GLUCONATE 15 MILLILITER(S): 213 SOLUTION TOPICAL at 18:43

## 2023-01-01 RX ADMIN — MORPHINE 6 MILLIGRAM(S): 10 SOLUTION ORAL at 18:51

## 2023-01-01 RX ADMIN — PANTOPRAZOLE SODIUM 40 MILLIGRAM(S): 20 TABLET, DELAYED RELEASE ORAL at 10:13

## 2023-01-01 RX ADMIN — CHLORHEXIDINE GLUCONATE 1 APPLICATION(S): 213 SOLUTION TOPICAL at 05:50

## 2023-01-01 RX ADMIN — ATORVASTATIN CALCIUM 20 MILLIGRAM(S): 80 TABLET, FILM COATED ORAL at 21:55

## 2023-01-01 RX ADMIN — Medication 5 MILLIGRAM(S): at 13:05

## 2023-01-01 RX ADMIN — ATORVASTATIN CALCIUM 20 MILLIGRAM(S): 80 TABLET, FILM COATED ORAL at 22:48

## 2023-01-01 RX ADMIN — MORPHINE 6 MILLIGRAM(S): 10 SOLUTION ORAL at 11:53

## 2023-01-01 RX ADMIN — Medication 5 MILLIGRAM(S): at 05:54

## 2023-01-01 RX ADMIN — NYSTATIN CREAM 1 APPLICATION(S): 100000 CREAM TOPICAL at 07:41

## 2023-01-01 RX ADMIN — NYSTATIN CREAM 1 APPLICATION(S): 100000 CREAM TOPICAL at 21:28

## 2023-01-01 RX ADMIN — HEPARIN SODIUM 5000 UNIT(S): 5000 INJECTION INTRAVENOUS; SUBCUTANEOUS at 20:59

## 2023-01-01 RX ADMIN — OCTREOTIDE ACETATE 100 MICROGRAM(S): 200 INJECTION, SOLUTION INTRAVENOUS; SUBCUTANEOUS at 00:38

## 2023-01-01 RX ADMIN — NYSTATIN CREAM 1 APPLICATION(S): 100000 CREAM TOPICAL at 13:06

## 2023-01-01 RX ADMIN — Medication 2 TABLET(S): at 11:01

## 2023-01-01 RX ADMIN — Medication 300 MILLIGRAM(S): at 09:16

## 2023-01-01 RX ADMIN — ATORVASTATIN CALCIUM 20 MILLIGRAM(S): 80 TABLET, FILM COATED ORAL at 21:32

## 2023-01-01 RX ADMIN — Medication 5 MILLIGRAM(S): at 18:40

## 2023-01-01 RX ADMIN — AMLODIPINE BESYLATE 10 MILLIGRAM(S): 2.5 TABLET ORAL at 05:03

## 2023-01-01 RX ADMIN — Medication 2 TABLET(S): at 00:02

## 2023-01-01 RX ADMIN — Medication 4 MILLIGRAM(S): at 17:35

## 2023-01-01 RX ADMIN — CHLORHEXIDINE GLUCONATE 1 APPLICATION(S): 213 SOLUTION TOPICAL at 06:44

## 2023-01-01 RX ADMIN — Medication 1 EACH: at 17:39

## 2023-01-01 RX ADMIN — CHOLESTYRAMINE 4 GRAM(S): 4 POWDER, FOR SUSPENSION ORAL at 22:16

## 2023-01-01 RX ADMIN — Medication 4 MILLIGRAM(S): at 12:07

## 2023-01-01 RX ADMIN — SODIUM CHLORIDE 1000 MILLILITER(S): 9 INJECTION, SOLUTION INTRAVENOUS at 13:19

## 2023-01-01 RX ADMIN — Medication 50 MICROGRAM(S): at 06:01

## 2023-01-01 RX ADMIN — CHOLESTYRAMINE 4 GRAM(S): 4 POWDER, FOR SUSPENSION ORAL at 16:44

## 2023-01-01 RX ADMIN — URSODIOL 300 MILLIGRAM(S): 250 TABLET, FILM COATED ORAL at 06:28

## 2023-01-01 RX ADMIN — OCTREOTIDE ACETATE 100 MICROGRAM(S): 200 INJECTION, SOLUTION INTRAVENOUS; SUBCUTANEOUS at 10:58

## 2023-01-01 RX ADMIN — CHLORHEXIDINE GLUCONATE 1 APPLICATION(S): 213 SOLUTION TOPICAL at 05:09

## 2023-01-01 RX ADMIN — SCOPALAMINE 1 PATCH: 1 PATCH, EXTENDED RELEASE TRANSDERMAL at 05:35

## 2023-01-01 RX ADMIN — Medication 4 MILLIGRAM(S): at 17:27

## 2023-01-01 RX ADMIN — Medication 4 MILLIGRAM(S): at 11:22

## 2023-01-01 RX ADMIN — Medication 150 MILLIGRAM(S): at 17:13

## 2023-01-01 RX ADMIN — HEPARIN SODIUM 5000 UNIT(S): 5000 INJECTION INTRAVENOUS; SUBCUTANEOUS at 14:11

## 2023-01-01 RX ADMIN — Medication 250 MILLIGRAM(S): at 14:45

## 2023-01-01 RX ADMIN — Medication 1 MILLIGRAM(S): at 12:27

## 2023-01-01 RX ADMIN — Medication 5 MILLIGRAM(S): at 19:40

## 2023-01-01 RX ADMIN — Medication 40 MICROGRAM(S): at 22:13

## 2023-01-01 RX ADMIN — HYDROMORPHONE HYDROCHLORIDE 0.5 MILLIGRAM(S): 2 INJECTION INTRAMUSCULAR; INTRAVENOUS; SUBCUTANEOUS at 08:43

## 2023-01-01 RX ADMIN — Medication 5 MILLIGRAM(S): at 06:15

## 2023-01-01 RX ADMIN — MORPHINE 6 MILLIGRAM(S): 10 SOLUTION ORAL at 23:33

## 2023-01-01 RX ADMIN — Medication 4 MILLIGRAM(S): at 17:10

## 2023-01-01 RX ADMIN — PANTOPRAZOLE SODIUM 40 MILLIGRAM(S): 20 TABLET, DELAYED RELEASE ORAL at 21:08

## 2023-01-01 RX ADMIN — Medication 150 MILLIGRAM(S): at 06:14

## 2023-01-01 RX ADMIN — Medication 5 MILLIGRAM(S): at 17:07

## 2023-01-01 RX ADMIN — CHOLESTYRAMINE 4 GRAM(S): 4 POWDER, FOR SUSPENSION ORAL at 10:51

## 2023-01-01 RX ADMIN — Medication 65 MILLILITER(S): at 12:00

## 2023-01-01 RX ADMIN — CHLORHEXIDINE GLUCONATE 1 APPLICATION(S): 213 SOLUTION TOPICAL at 06:18

## 2023-01-01 RX ADMIN — ONDANSETRON 4 MILLIGRAM(S): 8 TABLET, FILM COATED ORAL at 10:19

## 2023-01-01 RX ADMIN — HYDROMORPHONE HYDROCHLORIDE 0.5 MILLIGRAM(S): 2 INJECTION INTRAMUSCULAR; INTRAVENOUS; SUBCUTANEOUS at 10:24

## 2023-01-01 RX ADMIN — IMIPENEM AND CILASTATIN 250 MILLIGRAM(S): 250; 250 INJECTION, POWDER, FOR SOLUTION INTRAVENOUS at 15:25

## 2023-01-01 RX ADMIN — CHLORHEXIDINE GLUCONATE 1 APPLICATION(S): 213 SOLUTION TOPICAL at 07:04

## 2023-01-01 RX ADMIN — Medication 10 MILLIGRAM(S): at 23:45

## 2023-01-01 RX ADMIN — URSODIOL 300 MILLIGRAM(S): 250 TABLET, FILM COATED ORAL at 05:00

## 2023-01-01 RX ADMIN — Medication 40 MILLIGRAM(S): at 17:25

## 2023-01-01 RX ADMIN — PANTOPRAZOLE SODIUM 40 MILLIGRAM(S): 20 TABLET, DELAYED RELEASE ORAL at 09:33

## 2023-01-01 RX ADMIN — ATORVASTATIN CALCIUM 20 MILLIGRAM(S): 80 TABLET, FILM COATED ORAL at 21:56

## 2023-01-01 RX ADMIN — HYDROMORPHONE HYDROCHLORIDE 0.5 MILLIGRAM(S): 2 INJECTION INTRAMUSCULAR; INTRAVENOUS; SUBCUTANEOUS at 19:30

## 2023-01-01 RX ADMIN — CHLORHEXIDINE GLUCONATE 15 MILLILITER(S): 213 SOLUTION TOPICAL at 18:31

## 2023-01-01 RX ADMIN — Medication 100 GRAM(S): at 08:28

## 2023-01-01 RX ADMIN — URSODIOL 300 MILLIGRAM(S): 250 TABLET, FILM COATED ORAL at 17:43

## 2023-01-01 RX ADMIN — MORPHINE 6 MILLIGRAM(S): 10 SOLUTION ORAL at 06:08

## 2023-01-01 RX ADMIN — CHOLESTYRAMINE 4 GRAM(S): 4 POWDER, FOR SUSPENSION ORAL at 16:34

## 2023-01-01 RX ADMIN — Medication 4 MILLIGRAM(S): at 05:51

## 2023-01-01 RX ADMIN — Medication 10 MILLIGRAM(S): at 04:34

## 2023-01-01 RX ADMIN — CHOLESTYRAMINE 4 GRAM(S): 4 POWDER, FOR SUSPENSION ORAL at 10:29

## 2023-01-01 RX ADMIN — ENOXAPARIN SODIUM 100 MILLIGRAM(S): 100 INJECTION SUBCUTANEOUS at 06:14

## 2023-01-01 RX ADMIN — Medication 5 MILLIGRAM(S): at 04:39

## 2023-01-01 RX ADMIN — Medication 100 GRAM(S): at 06:54

## 2023-01-01 RX ADMIN — HEPARIN SODIUM 5000 UNIT(S): 5000 INJECTION INTRAVENOUS; SUBCUTANEOUS at 05:29

## 2023-01-01 RX ADMIN — OCTREOTIDE ACETATE 100 MICROGRAM(S): 200 INJECTION, SOLUTION INTRAVENOUS; SUBCUTANEOUS at 09:30

## 2023-01-01 RX ADMIN — CHOLESTYRAMINE 4 GRAM(S): 4 POWDER, FOR SUSPENSION ORAL at 16:43

## 2023-01-01 RX ADMIN — SODIUM CHLORIDE 100 MILLILITER(S): 9 INJECTION, SOLUTION INTRAVENOUS at 17:01

## 2023-01-01 RX ADMIN — AMLODIPINE BESYLATE 10 MILLIGRAM(S): 2.5 TABLET ORAL at 05:28

## 2023-01-01 RX ADMIN — SODIUM CHLORIDE 110 MILLILITER(S): 9 INJECTION, SOLUTION INTRAVENOUS at 11:03

## 2023-01-01 RX ADMIN — Medication 1 EACH: at 17:28

## 2023-01-01 RX ADMIN — Medication 300 MILLIGRAM(S): at 11:18

## 2023-01-01 RX ADMIN — Medication 100 MILLIEQUIVALENT(S): at 09:21

## 2023-01-01 RX ADMIN — Medication 4 MILLIGRAM(S): at 17:02

## 2023-01-01 RX ADMIN — Medication 40 MILLIGRAM(S): at 04:57

## 2023-01-01 RX ADMIN — CHOLESTYRAMINE 4 GRAM(S): 4 POWDER, FOR SUSPENSION ORAL at 18:23

## 2023-01-01 RX ADMIN — SODIUM CHLORIDE 1000 MILLILITER(S): 9 INJECTION, SOLUTION INTRAVENOUS at 17:48

## 2023-01-01 RX ADMIN — Medication 2 TABLET(S): at 11:04

## 2023-01-01 RX ADMIN — Medication 75 MILLILITER(S): at 05:00

## 2023-01-01 RX ADMIN — HEPARIN SODIUM 5000 UNIT(S): 5000 INJECTION INTRAVENOUS; SUBCUTANEOUS at 05:59

## 2023-01-01 RX ADMIN — HYDROMORPHONE HYDROCHLORIDE 0.2 MILLIGRAM(S): 2 INJECTION INTRAMUSCULAR; INTRAVENOUS; SUBCUTANEOUS at 09:35

## 2023-01-01 RX ADMIN — Medication 40 MICROGRAM(S): at 05:46

## 2023-01-01 RX ADMIN — Medication 20 MILLIGRAM(S): at 06:08

## 2023-01-01 RX ADMIN — Medication 25 MILLIGRAM(S): at 21:23

## 2023-01-01 RX ADMIN — IMIPENEM AND CILASTATIN 250 MILLIGRAM(S): 250; 250 INJECTION, POWDER, FOR SOLUTION INTRAVENOUS at 22:13

## 2023-01-01 RX ADMIN — Medication 25 GRAM(S): at 11:03

## 2023-01-01 RX ADMIN — CHLORHEXIDINE GLUCONATE 1 APPLICATION(S): 213 SOLUTION TOPICAL at 06:16

## 2023-01-01 RX ADMIN — Medication 2 TABLET(S): at 05:46

## 2023-01-01 RX ADMIN — NYSTATIN CREAM 1 APPLICATION(S): 100000 CREAM TOPICAL at 21:54

## 2023-01-01 RX ADMIN — URSODIOL 300 MILLIGRAM(S): 250 TABLET, FILM COATED ORAL at 06:27

## 2023-01-01 RX ADMIN — SODIUM CHLORIDE 110 MILLILITER(S): 9 INJECTION, SOLUTION INTRAVENOUS at 10:00

## 2023-01-01 RX ADMIN — Medication 4 MILLIGRAM(S): at 17:06

## 2023-01-01 RX ADMIN — Medication 2 TABLET(S): at 18:03

## 2023-01-01 RX ADMIN — Medication 5 MILLIGRAM(S): at 11:26

## 2023-01-01 RX ADMIN — Medication 1000 MILLIGRAM(S): at 15:45

## 2023-01-01 RX ADMIN — MORPHINE 6 MILLIGRAM(S): 10 SOLUTION ORAL at 18:03

## 2023-01-01 RX ADMIN — CHOLESTYRAMINE 4 GRAM(S): 4 POWDER, FOR SUSPENSION ORAL at 10:11

## 2023-01-01 RX ADMIN — Medication 10 MILLIGRAM(S): at 18:50

## 2023-01-01 RX ADMIN — OCTREOTIDE ACETATE 100 MICROGRAM(S): 200 INJECTION, SOLUTION INTRAVENOUS; SUBCUTANEOUS at 14:52

## 2023-01-01 RX ADMIN — Medication 5 MILLIGRAM(S): at 06:04

## 2023-01-01 RX ADMIN — NYSTATIN CREAM 1 APPLICATION(S): 100000 CREAM TOPICAL at 13:21

## 2023-01-01 RX ADMIN — Medication 10 MILLIGRAM(S): at 07:30

## 2023-01-01 RX ADMIN — Medication 1 EACH: at 17:59

## 2023-01-01 RX ADMIN — Medication 40 MILLIEQUIVALENT(S): at 16:37

## 2023-01-01 RX ADMIN — POTASSIUM PHOSPHATE, MONOBASIC POTASSIUM PHOSPHATE, DIBASIC 41.67 MILLIMOLE(S): 236; 224 INJECTION, SOLUTION INTRAVENOUS at 10:29

## 2023-01-01 RX ADMIN — CHLORHEXIDINE GLUCONATE 1 APPLICATION(S): 213 SOLUTION TOPICAL at 05:34

## 2023-01-01 RX ADMIN — HEPARIN SODIUM 5000 UNIT(S): 5000 INJECTION INTRAVENOUS; SUBCUTANEOUS at 16:37

## 2023-01-01 RX ADMIN — HYDROMORPHONE HYDROCHLORIDE 0.5 MILLIGRAM(S): 2 INJECTION INTRAMUSCULAR; INTRAVENOUS; SUBCUTANEOUS at 19:43

## 2023-01-01 RX ADMIN — Medication 2 TABLET(S): at 11:06

## 2023-01-01 RX ADMIN — HEPARIN SODIUM 5000 UNIT(S): 5000 INJECTION INTRAVENOUS; SUBCUTANEOUS at 05:31

## 2023-01-01 RX ADMIN — NYSTATIN CREAM 1 APPLICATION(S): 100000 CREAM TOPICAL at 13:22

## 2023-01-01 RX ADMIN — NYSTATIN CREAM 1 APPLICATION(S): 100000 CREAM TOPICAL at 15:46

## 2023-01-01 RX ADMIN — Medication 4 MILLIGRAM(S): at 08:00

## 2023-01-01 RX ADMIN — HYDROMORPHONE HYDROCHLORIDE 0.5 MILLIGRAM(S): 2 INJECTION INTRAMUSCULAR; INTRAVENOUS; SUBCUTANEOUS at 00:22

## 2023-01-01 RX ADMIN — OCTREOTIDE ACETATE 100 MICROGRAM(S): 200 INJECTION, SOLUTION INTRAVENOUS; SUBCUTANEOUS at 10:00

## 2023-01-01 RX ADMIN — AMLODIPINE BESYLATE 5 MILLIGRAM(S): 2.5 TABLET ORAL at 06:17

## 2023-01-01 RX ADMIN — Medication 25 MILLIGRAM(S): at 21:43

## 2023-01-01 RX ADMIN — Medication 25 GRAM(S): at 10:24

## 2023-01-01 RX ADMIN — Medication 5 MILLIGRAM(S): at 21:46

## 2023-01-01 RX ADMIN — SODIUM CHLORIDE 1000 MILLILITER(S): 9 INJECTION INTRAMUSCULAR; INTRAVENOUS; SUBCUTANEOUS at 14:23

## 2023-01-01 RX ADMIN — Medication 50 MICROGRAM(S): at 05:32

## 2023-01-01 RX ADMIN — OCTREOTIDE ACETATE 100 MICROGRAM(S): 200 INJECTION, SOLUTION INTRAVENOUS; SUBCUTANEOUS at 18:28

## 2023-01-01 RX ADMIN — CEFEPIME 100 MILLIGRAM(S): 1 INJECTION, POWDER, FOR SOLUTION INTRAMUSCULAR; INTRAVENOUS at 09:55

## 2023-01-01 RX ADMIN — HEPARIN SODIUM 5000 UNIT(S): 5000 INJECTION INTRAVENOUS; SUBCUTANEOUS at 21:32

## 2023-01-01 RX ADMIN — Medication 10 MILLIGRAM(S): at 09:16

## 2023-01-01 RX ADMIN — MORPHINE 6 MILLIGRAM(S): 10 SOLUTION ORAL at 18:49

## 2023-01-01 RX ADMIN — HEPARIN SODIUM 5000 UNIT(S): 5000 INJECTION INTRAVENOUS; SUBCUTANEOUS at 13:59

## 2023-01-01 RX ADMIN — NYSTATIN CREAM 1 APPLICATION(S): 100000 CREAM TOPICAL at 07:11

## 2023-01-01 RX ADMIN — Medication 400 MILLIGRAM(S): at 06:50

## 2023-01-01 RX ADMIN — Medication 2 TABLET(S): at 23:15

## 2023-01-01 RX ADMIN — Medication 2 TABLET(S): at 18:39

## 2023-01-01 RX ADMIN — Medication 4 MILLIGRAM(S): at 23:09

## 2023-01-01 RX ADMIN — IRON SUCROSE 110 MILLIGRAM(S): 20 INJECTION, SOLUTION INTRAVENOUS at 09:45

## 2023-01-01 RX ADMIN — CHLORHEXIDINE GLUCONATE 1 APPLICATION(S): 213 SOLUTION TOPICAL at 07:29

## 2023-01-01 RX ADMIN — CHLORHEXIDINE GLUCONATE 1 APPLICATION(S): 213 SOLUTION TOPICAL at 07:26

## 2023-01-01 RX ADMIN — Medication 15 MILLIGRAM(S): at 17:16

## 2023-01-01 RX ADMIN — Medication 1000 MILLIGRAM(S): at 21:20

## 2023-01-01 RX ADMIN — Medication 40 MICROGRAM(S): at 21:09

## 2023-01-01 RX ADMIN — URSODIOL 300 MILLIGRAM(S): 250 TABLET, FILM COATED ORAL at 13:50

## 2023-01-01 RX ADMIN — Medication 2 TABLET(S): at 21:53

## 2023-01-01 RX ADMIN — Medication 2 TABLET(S): at 17:27

## 2023-01-01 RX ADMIN — LIDOCAINE 1 PATCH: 4 CREAM TOPICAL at 18:17

## 2023-01-01 RX ADMIN — Medication 50 MILLILITER(S): at 05:55

## 2023-01-01 RX ADMIN — I.V. FAT EMULSION 41.67 GM/KG/DAY: 20 EMULSION INTRAVENOUS at 17:59

## 2023-01-01 RX ADMIN — CHLORHEXIDINE GLUCONATE 1 APPLICATION(S): 213 SOLUTION TOPICAL at 06:34

## 2023-01-01 RX ADMIN — HYDROMORPHONE HYDROCHLORIDE 0.5 MILLIGRAM(S): 2 INJECTION INTRAMUSCULAR; INTRAVENOUS; SUBCUTANEOUS at 14:40

## 2023-01-01 RX ADMIN — SODIUM CHLORIDE 40 MILLILITER(S): 9 INJECTION INTRAMUSCULAR; INTRAVENOUS; SUBCUTANEOUS at 21:55

## 2023-01-01 RX ADMIN — Medication 100 GRAM(S): at 10:57

## 2023-01-01 RX ADMIN — Medication 4 MILLIGRAM(S): at 11:33

## 2023-01-01 RX ADMIN — Medication 25 MILLIGRAM(S): at 06:32

## 2023-01-01 RX ADMIN — Medication 50 MILLILITER(S): at 13:00

## 2023-01-01 RX ADMIN — NYSTATIN CREAM 1 APPLICATION(S): 100000 CREAM TOPICAL at 00:56

## 2023-01-01 RX ADMIN — CEFEPIME 100 MILLIGRAM(S): 1 INJECTION, POWDER, FOR SOLUTION INTRAMUSCULAR; INTRAVENOUS at 05:29

## 2023-01-01 RX ADMIN — HEPARIN SODIUM 5000 UNIT(S): 5000 INJECTION INTRAVENOUS; SUBCUTANEOUS at 05:33

## 2023-01-01 RX ADMIN — OCTREOTIDE ACETATE 100 MICROGRAM(S): 200 INJECTION, SOLUTION INTRAVENOUS; SUBCUTANEOUS at 11:09

## 2023-01-01 RX ADMIN — OCTREOTIDE ACETATE 100 MICROGRAM(S): 200 INJECTION, SOLUTION INTRAVENOUS; SUBCUTANEOUS at 09:12

## 2023-01-01 RX ADMIN — URSODIOL 300 MILLIGRAM(S): 250 TABLET, FILM COATED ORAL at 21:32

## 2023-01-01 RX ADMIN — Medication 10 MILLIGRAM(S): at 12:49

## 2023-01-01 RX ADMIN — HEPARIN SODIUM 5000 UNIT(S): 5000 INJECTION INTRAVENOUS; SUBCUTANEOUS at 06:41

## 2023-01-01 RX ADMIN — PANTOPRAZOLE SODIUM 40 MILLIGRAM(S): 20 TABLET, DELAYED RELEASE ORAL at 06:30

## 2023-01-01 RX ADMIN — HYDROMORPHONE HYDROCHLORIDE 0.25 MILLIGRAM(S): 2 INJECTION INTRAMUSCULAR; INTRAVENOUS; SUBCUTANEOUS at 23:15

## 2023-01-01 RX ADMIN — Medication 1 EACH: at 18:23

## 2023-01-01 RX ADMIN — Medication 5 MILLIGRAM(S): at 05:43

## 2023-01-01 RX ADMIN — NICARDIPINE HYDROCHLORIDE 25 MG/HR: 30 CAPSULE, EXTENDED RELEASE ORAL at 02:55

## 2023-01-01 RX ADMIN — MORPHINE 2 MILLIGRAM(S): 10 SOLUTION ORAL at 12:55

## 2023-01-01 RX ADMIN — Medication 4 MILLIGRAM(S): at 12:57

## 2023-01-01 RX ADMIN — Medication 4 MILLIGRAM(S): at 14:48

## 2023-01-01 RX ADMIN — URSODIOL 300 MILLIGRAM(S): 250 TABLET, FILM COATED ORAL at 13:16

## 2023-01-01 RX ADMIN — Medication 10 MILLIGRAM(S): at 03:20

## 2023-01-01 RX ADMIN — Medication 5 MILLIGRAM(S): at 05:01

## 2023-01-01 RX ADMIN — Medication 30 MICROGRAM(S): at 22:20

## 2023-01-01 RX ADMIN — PANTOPRAZOLE SODIUM 40 MILLIGRAM(S): 20 TABLET, DELAYED RELEASE ORAL at 06:15

## 2023-01-01 RX ADMIN — MORPHINE 6 MILLIGRAM(S): 10 SOLUTION ORAL at 06:28

## 2023-01-01 RX ADMIN — Medication 5 MILLIGRAM(S): at 00:41

## 2023-01-01 RX ADMIN — Medication 1 SPRAY(S): at 18:04

## 2023-01-01 RX ADMIN — URSODIOL 300 MILLIGRAM(S): 250 TABLET, FILM COATED ORAL at 21:18

## 2023-01-01 RX ADMIN — FAMOTIDINE 20 MILLIGRAM(S): 10 INJECTION INTRAVENOUS at 11:02

## 2023-01-01 RX ADMIN — HYDROMORPHONE HYDROCHLORIDE 0.5 MILLIGRAM(S): 2 INJECTION INTRAMUSCULAR; INTRAVENOUS; SUBCUTANEOUS at 23:11

## 2023-01-01 RX ADMIN — Medication 0.5 MILLIGRAM(S): at 23:51

## 2023-01-01 RX ADMIN — LOSARTAN POTASSIUM 25 MILLIGRAM(S): 100 TABLET, FILM COATED ORAL at 05:34

## 2023-01-01 RX ADMIN — Medication 30 MICROGRAM(S): at 22:50

## 2023-01-01 RX ADMIN — Medication 5 MILLIGRAM(S): at 13:33

## 2023-01-01 RX ADMIN — DAPTOMYCIN 132 MILLIGRAM(S): 500 INJECTION, POWDER, LYOPHILIZED, FOR SOLUTION INTRAVENOUS at 17:12

## 2023-01-01 RX ADMIN — LOSARTAN POTASSIUM 25 MILLIGRAM(S): 100 TABLET, FILM COATED ORAL at 05:33

## 2023-01-01 RX ADMIN — Medication 30 MICROGRAM(S): at 21:44

## 2023-01-01 RX ADMIN — MORPHINE 6 MILLIGRAM(S): 10 SOLUTION ORAL at 11:27

## 2023-01-01 RX ADMIN — CHOLESTYRAMINE 4 GRAM(S): 4 POWDER, FOR SUSPENSION ORAL at 18:57

## 2023-01-01 RX ADMIN — Medication 5 MILLIGRAM(S): at 23:59

## 2023-01-01 RX ADMIN — Medication 150 MILLIGRAM(S): at 07:18

## 2023-01-01 RX ADMIN — Medication 4 MILLIGRAM(S): at 06:27

## 2023-01-01 RX ADMIN — PANTOPRAZOLE SODIUM 40 MILLIGRAM(S): 20 TABLET, DELAYED RELEASE ORAL at 09:43

## 2023-01-01 RX ADMIN — NYSTATIN CREAM 1 APPLICATION(S): 100000 CREAM TOPICAL at 23:22

## 2023-01-01 RX ADMIN — Medication 5 MILLIGRAM(S): at 18:19

## 2023-01-01 RX ADMIN — Medication 30 MICROGRAM(S): at 22:53

## 2023-01-01 RX ADMIN — Medication 4 MILLIGRAM(S): at 21:25

## 2023-01-01 RX ADMIN — SODIUM CHLORIDE 500 MILLILITER(S): 9 INJECTION, SOLUTION INTRAVENOUS at 02:57

## 2023-01-01 RX ADMIN — AMIODARONE HYDROCHLORIDE 16.7 MG/MIN: 400 TABLET ORAL at 23:19

## 2023-01-01 RX ADMIN — Medication 4 MILLIGRAM(S): at 18:58

## 2023-01-01 RX ADMIN — MORPHINE 6 MILLIGRAM(S): 10 SOLUTION ORAL at 00:28

## 2023-01-01 RX ADMIN — Medication 4 MILLIGRAM(S): at 00:27

## 2023-01-01 RX ADMIN — NYSTATIN CREAM 1 APPLICATION(S): 100000 CREAM TOPICAL at 06:18

## 2023-01-01 RX ADMIN — Medication 300 MILLIGRAM(S): at 11:37

## 2023-01-01 RX ADMIN — Medication 5 MILLIGRAM(S): at 06:24

## 2023-01-01 RX ADMIN — HEPARIN SODIUM 5000 UNIT(S): 5000 INJECTION INTRAVENOUS; SUBCUTANEOUS at 22:51

## 2023-01-01 RX ADMIN — Medication 5 MILLIGRAM(S): at 01:09

## 2023-01-01 RX ADMIN — Medication 10 MILLIGRAM(S): at 00:58

## 2023-01-01 RX ADMIN — I.V. FAT EMULSION 41.67 GM/KG/DAY: 20 EMULSION INTRAVENOUS at 19:46

## 2023-01-01 RX ADMIN — Medication 4 MILLIGRAM(S): at 12:22

## 2023-01-01 RX ADMIN — OCTREOTIDE ACETATE 100 MICROGRAM(S): 200 INJECTION, SOLUTION INTRAVENOUS; SUBCUTANEOUS at 07:05

## 2023-01-01 RX ADMIN — Medication 2 TABLET(S): at 23:29

## 2023-01-01 RX ADMIN — CHOLESTYRAMINE 4 GRAM(S): 4 POWDER, FOR SUSPENSION ORAL at 17:29

## 2023-01-01 RX ADMIN — Medication 25 MILLIGRAM(S): at 03:47

## 2023-01-01 RX ADMIN — HEPARIN SODIUM 5000 UNIT(S): 5000 INJECTION INTRAVENOUS; SUBCUTANEOUS at 13:42

## 2023-01-01 RX ADMIN — Medication 300 MILLIGRAM(S): at 10:34

## 2023-01-01 RX ADMIN — Medication 2 TABLET(S): at 16:20

## 2023-01-01 RX ADMIN — Medication 2 TABLET(S): at 00:28

## 2023-01-01 RX ADMIN — Medication 40 MICROGRAM(S): at 07:39

## 2023-01-01 RX ADMIN — Medication 0.25 MILLIGRAM(S): at 00:50

## 2023-01-01 RX ADMIN — HEPARIN SODIUM 5000 UNIT(S): 5000 INJECTION INTRAVENOUS; SUBCUTANEOUS at 22:25

## 2023-01-01 RX ADMIN — Medication 10 MILLIGRAM(S): at 21:15

## 2023-01-01 RX ADMIN — Medication 40 MICROGRAM(S): at 00:11

## 2023-01-01 RX ADMIN — AMIODARONE HYDROCHLORIDE 16.7 MG/MIN: 400 TABLET ORAL at 14:45

## 2023-01-01 RX ADMIN — OCTREOTIDE ACETATE 100 MICROGRAM(S): 200 INJECTION, SOLUTION INTRAVENOUS; SUBCUTANEOUS at 01:23

## 2023-01-01 RX ADMIN — Medication 2 TABLET(S): at 05:43

## 2023-01-01 RX ADMIN — CHOLESTYRAMINE 4 GRAM(S): 4 POWDER, FOR SUSPENSION ORAL at 15:46

## 2023-01-01 RX ADMIN — Medication 4 MILLIGRAM(S): at 18:30

## 2023-01-01 RX ADMIN — Medication 2 TABLET(S): at 17:15

## 2023-01-01 RX ADMIN — HEPARIN SODIUM 5000 UNIT(S): 5000 INJECTION INTRAVENOUS; SUBCUTANEOUS at 13:46

## 2023-01-01 RX ADMIN — Medication 20 MILLIGRAM(S): at 19:58

## 2023-01-01 RX ADMIN — PANTOPRAZOLE SODIUM 40 MILLIGRAM(S): 20 TABLET, DELAYED RELEASE ORAL at 11:51

## 2023-01-01 RX ADMIN — Medication 2 TABLET(S): at 05:44

## 2023-01-01 RX ADMIN — CASPOFUNGIN ACETATE 260 MILLIGRAM(S): 7 INJECTION, POWDER, LYOPHILIZED, FOR SOLUTION INTRAVENOUS at 12:27

## 2023-01-01 RX ADMIN — FAMOTIDINE 20 MILLIGRAM(S): 10 INJECTION INTRAVENOUS at 11:35

## 2023-01-01 RX ADMIN — Medication 2 TABLET(S): at 14:23

## 2023-01-01 RX ADMIN — Medication 40 MICROGRAM(S): at 06:52

## 2023-01-01 RX ADMIN — MORPHINE 6 MILLIGRAM(S): 10 SOLUTION ORAL at 06:45

## 2023-01-01 RX ADMIN — HEPARIN SODIUM 5000 UNIT(S): 5000 INJECTION INTRAVENOUS; SUBCUTANEOUS at 22:04

## 2023-01-01 RX ADMIN — Medication 40 MICROGRAM(S): at 23:31

## 2023-01-01 RX ADMIN — CEFEPIME 100 MILLIGRAM(S): 1 INJECTION, POWDER, FOR SOLUTION INTRAMUSCULAR; INTRAVENOUS at 18:47

## 2023-01-01 RX ADMIN — HEPARIN SODIUM 5000 UNIT(S): 5000 INJECTION INTRAVENOUS; SUBCUTANEOUS at 06:12

## 2023-01-01 RX ADMIN — SODIUM CHLORIDE 500 MILLILITER(S): 9 INJECTION INTRAMUSCULAR; INTRAVENOUS; SUBCUTANEOUS at 00:52

## 2023-01-01 RX ADMIN — MORPHINE 6 MILLIGRAM(S): 10 SOLUTION ORAL at 23:01

## 2023-01-01 RX ADMIN — CHLORHEXIDINE GLUCONATE 1 APPLICATION(S): 213 SOLUTION TOPICAL at 05:51

## 2023-01-01 RX ADMIN — Medication 10 MILLIGRAM(S): at 02:44

## 2023-01-01 RX ADMIN — NYSTATIN CREAM 1 APPLICATION(S): 100000 CREAM TOPICAL at 21:45

## 2023-01-01 RX ADMIN — OCTREOTIDE ACETATE 100 MICROGRAM(S): 200 INJECTION, SOLUTION INTRAVENOUS; SUBCUTANEOUS at 00:12

## 2023-01-01 RX ADMIN — Medication 10 MILLIGRAM(S): at 09:39

## 2023-01-01 RX ADMIN — Medication 5 MILLIGRAM(S): at 03:23

## 2023-01-01 RX ADMIN — Medication 5 MILLIGRAM(S): at 23:19

## 2023-01-01 RX ADMIN — Medication 0.25 MILLIGRAM(S): at 00:53

## 2023-01-01 RX ADMIN — NYSTATIN CREAM 1 APPLICATION(S): 100000 CREAM TOPICAL at 06:49

## 2023-01-01 RX ADMIN — I.V. FAT EMULSION 41.67 GM/KG/DAY: 20 EMULSION INTRAVENOUS at 17:29

## 2023-01-01 RX ADMIN — Medication 5 MILLIGRAM(S): at 12:16

## 2023-01-01 RX ADMIN — ONDANSETRON 4 MILLIGRAM(S): 8 TABLET, FILM COATED ORAL at 11:10

## 2023-01-01 RX ADMIN — HEPARIN SODIUM 5000 UNIT(S): 5000 INJECTION INTRAVENOUS; SUBCUTANEOUS at 21:50

## 2023-01-01 RX ADMIN — CHLORHEXIDINE GLUCONATE 1 APPLICATION(S): 213 SOLUTION TOPICAL at 12:18

## 2023-01-01 RX ADMIN — Medication 25 MILLILITER(S): at 12:24

## 2023-01-01 RX ADMIN — Medication 150 MILLIGRAM(S): at 12:56

## 2023-01-01 RX ADMIN — Medication 50 MILLILITER(S): at 20:33

## 2023-01-01 RX ADMIN — Medication 5 MILLIGRAM(S): at 21:33

## 2023-01-01 RX ADMIN — Medication 4 MILLIGRAM(S): at 11:59

## 2023-01-01 RX ADMIN — Medication 2 TABLET(S): at 17:49

## 2023-01-01 RX ADMIN — IMIPENEM AND CILASTATIN 250 MILLIGRAM(S): 250; 250 INJECTION, POWDER, FOR SOLUTION INTRAVENOUS at 15:04

## 2023-01-01 RX ADMIN — SODIUM CHLORIDE 1000 MILLILITER(S): 9 INJECTION, SOLUTION INTRAVENOUS at 05:45

## 2023-01-01 RX ADMIN — Medication 250 MILLIGRAM(S): at 21:00

## 2023-01-01 RX ADMIN — Medication 4 MILLIGRAM(S): at 18:02

## 2023-01-01 RX ADMIN — HEPARIN SODIUM 5000 UNIT(S): 5000 INJECTION INTRAVENOUS; SUBCUTANEOUS at 06:29

## 2023-01-01 RX ADMIN — Medication 150 MILLIGRAM(S): at 11:17

## 2023-01-01 RX ADMIN — Medication 5 MILLIGRAM(S): at 17:28

## 2023-01-01 RX ADMIN — IMIPENEM AND CILASTATIN 250 MILLIGRAM(S): 250; 250 INJECTION, POWDER, FOR SOLUTION INTRAVENOUS at 10:17

## 2023-01-01 RX ADMIN — Medication 5 MILLIGRAM(S): at 06:49

## 2023-01-01 RX ADMIN — MORPHINE 6 MILLIGRAM(S): 10 SOLUTION ORAL at 05:34

## 2023-01-01 RX ADMIN — Medication 50 MILLILITER(S): at 01:08

## 2023-01-01 RX ADMIN — Medication 4 MILLIGRAM(S): at 23:38

## 2023-01-01 RX ADMIN — CEFTRIAXONE 100 MILLIGRAM(S): 500 INJECTION, POWDER, FOR SOLUTION INTRAMUSCULAR; INTRAVENOUS at 23:03

## 2023-01-01 RX ADMIN — Medication 5 MILLIGRAM(S): at 00:12

## 2023-01-01 RX ADMIN — Medication 20 MILLIGRAM(S): at 11:33

## 2023-01-01 RX ADMIN — Medication 20 MILLILITER(S): at 14:00

## 2023-01-01 RX ADMIN — OCTREOTIDE ACETATE 100 MICROGRAM(S): 200 INJECTION, SOLUTION INTRAVENOUS; SUBCUTANEOUS at 00:15

## 2023-01-01 RX ADMIN — NYSTATIN CREAM 1 APPLICATION(S): 100000 CREAM TOPICAL at 05:06

## 2023-01-01 RX ADMIN — Medication 4 MILLIGRAM(S): at 07:02

## 2023-01-01 RX ADMIN — MORPHINE 6 MILLIGRAM(S): 10 SOLUTION ORAL at 11:13

## 2023-01-01 RX ADMIN — CHLORHEXIDINE GLUCONATE 15 MILLILITER(S): 213 SOLUTION TOPICAL at 17:25

## 2023-01-01 RX ADMIN — Medication 2 TABLET(S): at 23:24

## 2023-01-01 RX ADMIN — Medication 4 MILLIGRAM(S): at 05:54

## 2023-01-01 RX ADMIN — Medication 1 EACH: at 19:26

## 2023-01-01 RX ADMIN — Medication 5 MILLIGRAM(S): at 19:02

## 2023-01-01 RX ADMIN — MORPHINE 6 MILLIGRAM(S): 10 SOLUTION ORAL at 12:17

## 2023-01-01 RX ADMIN — HEPARIN SODIUM 5000 UNIT(S): 5000 INJECTION INTRAVENOUS; SUBCUTANEOUS at 21:40

## 2023-01-01 RX ADMIN — Medication 4 MILLIGRAM(S): at 10:26

## 2023-01-01 RX ADMIN — Medication 10 MILLIGRAM(S): at 20:43

## 2023-01-01 RX ADMIN — NYSTATIN CREAM 1 APPLICATION(S): 100000 CREAM TOPICAL at 14:45

## 2023-01-01 RX ADMIN — NYSTATIN CREAM 1 APPLICATION(S): 100000 CREAM TOPICAL at 05:24

## 2023-01-01 RX ADMIN — Medication 15 MILLIGRAM(S): at 05:30

## 2023-01-01 RX ADMIN — Medication 4 MILLIGRAM(S): at 00:19

## 2023-01-01 RX ADMIN — Medication 50 MILLILITER(S): at 17:00

## 2023-01-01 RX ADMIN — Medication 2 TABLET(S): at 17:06

## 2023-01-01 RX ADMIN — ONDANSETRON 4 MILLIGRAM(S): 8 TABLET, FILM COATED ORAL at 23:35

## 2023-01-01 RX ADMIN — AMLODIPINE BESYLATE 10 MILLIGRAM(S): 2.5 TABLET ORAL at 05:40

## 2023-01-01 RX ADMIN — Medication 0.25 MILLIGRAM(S): at 03:55

## 2023-01-01 RX ADMIN — NYSTATIN CREAM 1 APPLICATION(S): 100000 CREAM TOPICAL at 15:15

## 2023-01-01 RX ADMIN — NYSTATIN CREAM 1 APPLICATION(S): 100000 CREAM TOPICAL at 13:18

## 2023-01-01 RX ADMIN — Medication 10 MILLIGRAM(S): at 17:02

## 2023-01-01 RX ADMIN — HEPARIN SODIUM 5000 UNIT(S): 5000 INJECTION INTRAVENOUS; SUBCUTANEOUS at 14:09

## 2023-01-01 RX ADMIN — NYSTATIN CREAM 1 APPLICATION(S): 100000 CREAM TOPICAL at 05:43

## 2023-01-01 RX ADMIN — Medication 5 MILLIGRAM(S): at 16:30

## 2023-01-01 RX ADMIN — GABAPENTIN 100 MILLIGRAM(S): 400 CAPSULE ORAL at 22:47

## 2023-01-01 RX ADMIN — URSODIOL 300 MILLIGRAM(S): 250 TABLET, FILM COATED ORAL at 21:36

## 2023-01-01 RX ADMIN — MORPHINE 6 MILLIGRAM(S): 10 SOLUTION ORAL at 00:15

## 2023-01-01 RX ADMIN — Medication 4 MILLIGRAM(S): at 06:46

## 2023-01-01 RX ADMIN — Medication 4 MILLIGRAM(S): at 05:44

## 2023-01-01 RX ADMIN — Medication 150 MILLIGRAM(S): at 18:19

## 2023-01-01 RX ADMIN — MORPHINE 6 MILLIGRAM(S): 10 SOLUTION ORAL at 05:41

## 2023-01-01 RX ADMIN — OCTREOTIDE ACETATE 100 MICROGRAM(S): 200 INJECTION, SOLUTION INTRAVENOUS; SUBCUTANEOUS at 09:00

## 2023-01-01 RX ADMIN — Medication 30 MICROGRAM(S): at 22:16

## 2023-01-01 RX ADMIN — Medication 4 MILLIGRAM(S): at 18:25

## 2023-01-01 RX ADMIN — IMIPENEM AND CILASTATIN 100 MILLIGRAM(S): 250; 250 INJECTION, POWDER, FOR SOLUTION INTRAVENOUS at 01:22

## 2023-01-01 RX ADMIN — CHLORHEXIDINE GLUCONATE 15 MILLILITER(S): 213 SOLUTION TOPICAL at 07:08

## 2023-01-01 RX ADMIN — Medication 4 MILLIGRAM(S): at 18:03

## 2023-01-01 RX ADMIN — DEXMEDETOMIDINE HYDROCHLORIDE IN 0.9% SODIUM CHLORIDE 5.05 MICROGRAM(S)/KG/HR: 4 INJECTION INTRAVENOUS at 07:26

## 2023-01-01 RX ADMIN — Medication 150 MILLIGRAM(S): at 05:37

## 2023-01-01 RX ADMIN — Medication 40 MILLIGRAM(S): at 18:11

## 2023-01-01 RX ADMIN — ENOXAPARIN SODIUM 100 MILLIGRAM(S): 100 INJECTION SUBCUTANEOUS at 17:14

## 2023-01-01 RX ADMIN — URSODIOL 300 MILLIGRAM(S): 250 TABLET, FILM COATED ORAL at 06:45

## 2023-01-01 RX ADMIN — POTASSIUM PHOSPHATE, MONOBASIC POTASSIUM PHOSPHATE, DIBASIC 62.5 MILLIMOLE(S): 236; 224 INJECTION, SOLUTION INTRAVENOUS at 13:49

## 2023-01-01 RX ADMIN — Medication 4 MILLIGRAM(S): at 17:24

## 2023-01-01 RX ADMIN — MORPHINE 6 MILLIGRAM(S): 10 SOLUTION ORAL at 23:41

## 2023-01-01 RX ADMIN — Medication 2 TABLET(S): at 06:26

## 2023-01-01 RX ADMIN — Medication 4 MILLIGRAM(S): at 06:13

## 2023-01-01 RX ADMIN — I.V. FAT EMULSION 41.67 GM/KG/DAY: 20 EMULSION INTRAVENOUS at 17:46

## 2023-01-01 RX ADMIN — Medication 5 MILLIGRAM(S): at 17:42

## 2023-01-01 RX ADMIN — Medication 2 TABLET(S): at 17:38

## 2023-01-01 RX ADMIN — Medication 4 MILLIGRAM(S): at 22:40

## 2023-01-01 RX ADMIN — SODIUM CHLORIDE 130 MILLILITER(S): 9 INJECTION, SOLUTION INTRAVENOUS at 18:17

## 2023-01-01 RX ADMIN — Medication 150 MILLIGRAM(S): at 06:35

## 2023-01-01 RX ADMIN — Medication 4 MILLIGRAM(S): at 11:44

## 2023-01-01 RX ADMIN — Medication 10 MILLIGRAM(S): at 07:19

## 2023-01-01 RX ADMIN — Medication 4 MILLIGRAM(S): at 05:00

## 2023-01-01 RX ADMIN — Medication 100 MILLIGRAM(S): at 05:44

## 2023-01-01 RX ADMIN — NYSTATIN CREAM 1 APPLICATION(S): 100000 CREAM TOPICAL at 21:32

## 2023-01-01 RX ADMIN — SODIUM CHLORIDE 1000 MILLILITER(S): 9 INJECTION, SOLUTION INTRAVENOUS at 08:49

## 2023-01-01 RX ADMIN — Medication 15 MILLIGRAM(S): at 15:15

## 2023-01-01 RX ADMIN — AMLODIPINE BESYLATE 10 MILLIGRAM(S): 2.5 TABLET ORAL at 05:01

## 2023-01-01 RX ADMIN — Medication 5 MILLIGRAM(S): at 17:04

## 2023-01-01 RX ADMIN — PANTOPRAZOLE SODIUM 40 MILLIGRAM(S): 20 TABLET, DELAYED RELEASE ORAL at 11:46

## 2023-01-01 RX ADMIN — NYSTATIN CREAM 1 APPLICATION(S): 100000 CREAM TOPICAL at 13:45

## 2023-01-01 RX ADMIN — Medication 5 MILLIGRAM(S): at 23:58

## 2023-01-01 RX ADMIN — AMLODIPINE BESYLATE 10 MILLIGRAM(S): 2.5 TABLET ORAL at 05:53

## 2023-01-01 RX ADMIN — HEPARIN SODIUM 5000 UNIT(S): 5000 INJECTION INTRAVENOUS; SUBCUTANEOUS at 14:56

## 2023-01-01 RX ADMIN — Medication 25 MILLIGRAM(S): at 09:49

## 2023-01-01 RX ADMIN — IMIPENEM AND CILASTATIN 250 MILLIGRAM(S): 250; 250 INJECTION, POWDER, FOR SOLUTION INTRAVENOUS at 02:23

## 2023-01-01 RX ADMIN — NYSTATIN CREAM 1 APPLICATION(S): 100000 CREAM TOPICAL at 22:36

## 2023-01-01 RX ADMIN — Medication 20 MILLIGRAM(S): at 03:55

## 2023-01-01 RX ADMIN — HEPARIN SODIUM 5000 UNIT(S): 5000 INJECTION INTRAVENOUS; SUBCUTANEOUS at 13:48

## 2023-01-01 RX ADMIN — Medication 0.5 MILLIGRAM(S): at 00:50

## 2023-01-01 RX ADMIN — CHLORHEXIDINE GLUCONATE 1 APPLICATION(S): 213 SOLUTION TOPICAL at 06:29

## 2023-01-01 RX ADMIN — Medication 4 MILLIGRAM(S): at 12:15

## 2023-01-01 RX ADMIN — Medication 50 MICROGRAM(S): at 05:18

## 2023-01-01 RX ADMIN — FLUCONAZOLE 100 MILLIGRAM(S): 150 TABLET ORAL at 01:32

## 2023-01-01 RX ADMIN — Medication 25 MILLIGRAM(S): at 12:21

## 2023-01-01 RX ADMIN — Medication 5 MILLIGRAM(S): at 22:25

## 2023-01-01 RX ADMIN — Medication 400 MILLIGRAM(S): at 21:19

## 2023-01-01 RX ADMIN — CHOLESTYRAMINE 4 GRAM(S): 4 POWDER, FOR SUSPENSION ORAL at 21:51

## 2023-01-01 RX ADMIN — Medication 65 MILLILITER(S): at 05:17

## 2023-01-01 RX ADMIN — Medication 4 MILLIGRAM(S): at 11:01

## 2023-01-01 RX ADMIN — Medication 0.25 MILLIGRAM(S): at 03:25

## 2023-01-01 RX ADMIN — Medication 25 MILLIGRAM(S): at 11:35

## 2023-01-01 RX ADMIN — Medication 65 MILLILITER(S): at 18:06

## 2023-01-01 RX ADMIN — Medication 2 TABLET(S): at 12:23

## 2023-01-01 RX ADMIN — Medication 400 MILLIGRAM(S): at 04:29

## 2023-01-01 RX ADMIN — OCTREOTIDE ACETATE 100 MICROGRAM(S): 200 INJECTION, SOLUTION INTRAVENOUS; SUBCUTANEOUS at 17:45

## 2023-01-01 RX ADMIN — LOSARTAN POTASSIUM 25 MILLIGRAM(S): 100 TABLET, FILM COATED ORAL at 05:23

## 2023-01-01 RX ADMIN — Medication 2 TABLET(S): at 18:40

## 2023-01-01 RX ADMIN — Medication 250 MILLIGRAM(S): at 03:21

## 2023-01-01 RX ADMIN — Medication 4 MILLIGRAM(S): at 03:42

## 2023-01-01 RX ADMIN — Medication 10 MILLIGRAM(S): at 23:56

## 2023-01-01 RX ADMIN — Medication 100 GRAM(S): at 08:14

## 2023-01-01 RX ADMIN — SODIUM CHLORIDE 1000 MILLILITER(S): 9 INJECTION INTRAMUSCULAR; INTRAVENOUS; SUBCUTANEOUS at 17:53

## 2023-01-01 RX ADMIN — Medication 25 MILLIGRAM(S): at 06:26

## 2023-01-01 RX ADMIN — LOSARTAN POTASSIUM 25 MILLIGRAM(S): 100 TABLET, FILM COATED ORAL at 06:40

## 2023-01-01 RX ADMIN — Medication 40 MICROGRAM(S): at 00:30

## 2023-01-01 RX ADMIN — Medication 5 MILLIGRAM(S): at 10:32

## 2023-01-01 RX ADMIN — CHOLESTYRAMINE 4 GRAM(S): 4 POWDER, FOR SUSPENSION ORAL at 15:00

## 2023-01-01 RX ADMIN — Medication 50 MILLILITER(S): at 16:37

## 2023-01-01 RX ADMIN — AMLODIPINE BESYLATE 10 MILLIGRAM(S): 2.5 TABLET ORAL at 06:04

## 2023-01-01 RX ADMIN — Medication 10 MILLIGRAM(S): at 23:54

## 2023-01-01 RX ADMIN — SCOPALAMINE 1 PATCH: 1 PATCH, EXTENDED RELEASE TRANSDERMAL at 06:56

## 2023-01-01 RX ADMIN — Medication 150 MILLIGRAM(S): at 17:35

## 2023-01-01 RX ADMIN — CHOLESTYRAMINE 4 GRAM(S): 4 POWDER, FOR SUSPENSION ORAL at 15:10

## 2023-01-01 RX ADMIN — HEPARIN SODIUM 5000 UNIT(S): 5000 INJECTION INTRAVENOUS; SUBCUTANEOUS at 05:42

## 2023-01-01 RX ADMIN — HEPARIN SODIUM 5000 UNIT(S): 5000 INJECTION INTRAVENOUS; SUBCUTANEOUS at 13:16

## 2023-01-01 RX ADMIN — Medication 0.5 MILLIGRAM(S): at 03:43

## 2023-01-01 RX ADMIN — PANTOPRAZOLE SODIUM 40 MILLIGRAM(S): 20 TABLET, DELAYED RELEASE ORAL at 22:12

## 2023-01-01 RX ADMIN — Medication 2 TABLET(S): at 22:04

## 2023-01-01 RX ADMIN — OCTREOTIDE ACETATE 100 MICROGRAM(S): 200 INJECTION, SOLUTION INTRAVENOUS; SUBCUTANEOUS at 18:03

## 2023-01-01 RX ADMIN — Medication 5 MILLIGRAM(S): at 05:35

## 2023-01-01 RX ADMIN — CHLORHEXIDINE GLUCONATE 1 APPLICATION(S): 213 SOLUTION TOPICAL at 07:03

## 2023-01-01 RX ADMIN — Medication 10 MILLIGRAM(S): at 23:09

## 2023-01-01 RX ADMIN — Medication 40 MICROGRAM(S): at 07:38

## 2023-01-01 RX ADMIN — Medication 0.13 MILLIGRAM(S): at 19:38

## 2023-01-01 RX ADMIN — Medication 4 MILLIGRAM(S): at 18:50

## 2023-01-01 RX ADMIN — Medication 40 MICROGRAM(S): at 22:59

## 2023-01-01 RX ADMIN — CHLORHEXIDINE GLUCONATE 15 MILLILITER(S): 213 SOLUTION TOPICAL at 05:02

## 2023-01-01 RX ADMIN — Medication 2 TABLET(S): at 00:05

## 2023-01-01 RX ADMIN — Medication 150 MILLIGRAM(S): at 18:05

## 2023-01-01 RX ADMIN — Medication 5 MILLIGRAM(S): at 21:22

## 2023-01-01 RX ADMIN — Medication 40 MICROGRAM(S): at 06:50

## 2023-01-01 RX ADMIN — HYDROMORPHONE HYDROCHLORIDE 0.25 MILLIGRAM(S): 2 INJECTION INTRAMUSCULAR; INTRAVENOUS; SUBCUTANEOUS at 01:45

## 2023-01-01 RX ADMIN — LOSARTAN POTASSIUM 25 MILLIGRAM(S): 100 TABLET, FILM COATED ORAL at 05:31

## 2023-01-01 RX ADMIN — Medication 4 MILLIGRAM(S): at 12:54

## 2023-01-01 RX ADMIN — IMIPENEM AND CILASTATIN 250 MILLIGRAM(S): 250; 250 INJECTION, POWDER, FOR SOLUTION INTRAVENOUS at 18:10

## 2023-01-01 RX ADMIN — Medication 5 MILLIGRAM(S): at 17:35

## 2023-01-01 RX ADMIN — CASPOFUNGIN ACETATE 260 MILLIGRAM(S): 7 INJECTION, POWDER, LYOPHILIZED, FOR SOLUTION INTRAVENOUS at 11:52

## 2023-01-01 RX ADMIN — CHLORHEXIDINE GLUCONATE 1 APPLICATION(S): 213 SOLUTION TOPICAL at 05:41

## 2023-01-01 RX ADMIN — Medication 300 MILLIGRAM(S): at 09:33

## 2023-01-01 RX ADMIN — Medication 650 MILLIGRAM(S): at 07:09

## 2023-01-01 RX ADMIN — MORPHINE 6 MILLIGRAM(S): 10 SOLUTION ORAL at 13:18

## 2023-01-01 RX ADMIN — Medication 10 MILLIGRAM(S): at 21:05

## 2023-01-01 RX ADMIN — MORPHINE 6 MILLIGRAM(S): 10 SOLUTION ORAL at 06:10

## 2023-01-01 RX ADMIN — URSODIOL 300 MILLIGRAM(S): 250 TABLET, FILM COATED ORAL at 14:18

## 2023-01-01 RX ADMIN — Medication 5 MILLIGRAM(S): at 23:16

## 2023-01-01 RX ADMIN — OCTREOTIDE ACETATE 100 MICROGRAM(S): 200 INJECTION, SOLUTION INTRAVENOUS; SUBCUTANEOUS at 17:26

## 2023-01-01 RX ADMIN — DEXMEDETOMIDINE HYDROCHLORIDE IN 0.9% SODIUM CHLORIDE 5.05 MICROGRAM(S)/KG/HR: 4 INJECTION INTRAVENOUS at 00:30

## 2023-01-01 RX ADMIN — URSODIOL 300 MILLIGRAM(S): 250 TABLET, FILM COATED ORAL at 21:08

## 2023-01-01 RX ADMIN — CHLORHEXIDINE GLUCONATE 1 APPLICATION(S): 213 SOLUTION TOPICAL at 05:11

## 2023-01-01 RX ADMIN — Medication 40 MICROGRAM(S): at 21:58

## 2023-01-01 RX ADMIN — URSODIOL 300 MILLIGRAM(S): 250 TABLET, FILM COATED ORAL at 14:23

## 2023-01-01 RX ADMIN — LOSARTAN POTASSIUM 25 MILLIGRAM(S): 100 TABLET, FILM COATED ORAL at 07:47

## 2023-01-01 RX ADMIN — IMIPENEM AND CILASTATIN 250 MILLIGRAM(S): 250; 250 INJECTION, POWDER, FOR SOLUTION INTRAVENOUS at 18:44

## 2023-01-01 RX ADMIN — Medication 96 EACH: at 20:22

## 2023-01-01 RX ADMIN — DAPTOMYCIN 132 MILLIGRAM(S): 500 INJECTION, POWDER, LYOPHILIZED, FOR SOLUTION INTRAVENOUS at 17:04

## 2023-01-01 RX ADMIN — Medication 25 MILLIGRAM(S): at 22:12

## 2023-01-01 RX ADMIN — HEPARIN SODIUM 5000 UNIT(S): 5000 INJECTION INTRAVENOUS; SUBCUTANEOUS at 13:47

## 2023-01-01 RX ADMIN — ENOXAPARIN SODIUM 100 MILLIGRAM(S): 100 INJECTION SUBCUTANEOUS at 17:08

## 2023-01-01 RX ADMIN — I.V. FAT EMULSION 20.8 GM/KG/DAY: 20 EMULSION INTRAVENOUS at 18:00

## 2023-01-01 RX ADMIN — Medication 4 MILLIGRAM(S): at 11:28

## 2023-01-01 RX ADMIN — Medication 20 MILLIGRAM(S): at 10:27

## 2023-01-01 RX ADMIN — ERGOCALCIFEROL 50000 UNIT(S): 1.25 CAPSULE ORAL at 09:44

## 2023-01-01 RX ADMIN — Medication 40 MILLIGRAM(S): at 12:12

## 2023-01-01 RX ADMIN — URSODIOL 300 MILLIGRAM(S): 250 TABLET, FILM COATED ORAL at 14:31

## 2023-01-01 RX ADMIN — OXYCODONE HYDROCHLORIDE 2.5 MILLIGRAM(S): 5 TABLET ORAL at 00:19

## 2023-01-01 RX ADMIN — CHLORHEXIDINE GLUCONATE 1 APPLICATION(S): 213 SOLUTION TOPICAL at 06:42

## 2023-01-01 RX ADMIN — HEPARIN SODIUM 5000 UNIT(S): 5000 INJECTION INTRAVENOUS; SUBCUTANEOUS at 13:17

## 2023-01-01 RX ADMIN — Medication 4 MILLIGRAM(S): at 17:36

## 2023-01-01 RX ADMIN — CHOLESTYRAMINE 4 GRAM(S): 4 POWDER, FOR SUSPENSION ORAL at 22:07

## 2023-01-01 RX ADMIN — OCTREOTIDE ACETATE 100 MICROGRAM(S): 200 INJECTION, SOLUTION INTRAVENOUS; SUBCUTANEOUS at 18:10

## 2023-01-01 RX ADMIN — Medication 4 MILLIGRAM(S): at 12:30

## 2023-01-01 RX ADMIN — URSODIOL 300 MILLIGRAM(S): 250 TABLET, FILM COATED ORAL at 06:35

## 2023-01-01 RX ADMIN — MORPHINE 6 MILLIGRAM(S): 10 SOLUTION ORAL at 05:50

## 2023-01-01 RX ADMIN — Medication 5 MILLIGRAM(S): at 19:09

## 2023-01-01 RX ADMIN — Medication 10 MILLIGRAM(S): at 19:07

## 2023-01-01 RX ADMIN — Medication 5 MILLIGRAM(S): at 21:59

## 2023-01-01 RX ADMIN — SODIUM CHLORIDE 1000 MILLILITER(S): 9 INJECTION INTRAMUSCULAR; INTRAVENOUS; SUBCUTANEOUS at 17:37

## 2023-01-01 RX ADMIN — Medication 40 MICROGRAM(S): at 23:12

## 2023-01-01 RX ADMIN — Medication 40 MILLIGRAM(S): at 09:39

## 2023-01-01 RX ADMIN — LOSARTAN POTASSIUM 25 MILLIGRAM(S): 100 TABLET, FILM COATED ORAL at 07:09

## 2023-01-01 RX ADMIN — OXYCODONE HYDROCHLORIDE 2.5 MILLIGRAM(S): 5 TABLET ORAL at 00:50

## 2023-01-01 RX ADMIN — OCTREOTIDE ACETATE 100 MICROGRAM(S): 200 INJECTION, SOLUTION INTRAVENOUS; SUBCUTANEOUS at 10:42

## 2023-01-01 RX ADMIN — CHOLESTYRAMINE 4 GRAM(S): 4 POWDER, FOR SUSPENSION ORAL at 21:16

## 2023-01-01 RX ADMIN — IMIPENEM AND CILASTATIN 250 MILLIGRAM(S): 250; 250 INJECTION, POWDER, FOR SOLUTION INTRAVENOUS at 06:35

## 2023-01-01 RX ADMIN — PANTOPRAZOLE SODIUM 40 MILLIGRAM(S): 20 TABLET, DELAYED RELEASE ORAL at 08:28

## 2023-01-01 RX ADMIN — Medication 40 MICROGRAM(S): at 22:10

## 2023-01-01 RX ADMIN — Medication 25 MILLIGRAM(S): at 17:42

## 2023-01-01 RX ADMIN — Medication 10 MILLIGRAM(S): at 18:07

## 2023-01-01 RX ADMIN — HYDROMORPHONE HYDROCHLORIDE 0.5 MILLIGRAM(S): 2 INJECTION INTRAMUSCULAR; INTRAVENOUS; SUBCUTANEOUS at 16:29

## 2023-01-01 RX ADMIN — HEPARIN SODIUM 5000 UNIT(S): 5000 INJECTION INTRAVENOUS; SUBCUTANEOUS at 23:50

## 2023-01-01 RX ADMIN — Medication 1 EACH: at 17:25

## 2023-01-01 RX ADMIN — Medication 0.5 MILLIGRAM(S): at 00:24

## 2023-01-01 RX ADMIN — ATORVASTATIN CALCIUM 20 MILLIGRAM(S): 80 TABLET, FILM COATED ORAL at 22:15

## 2023-01-01 RX ADMIN — MORPHINE 6 MILLIGRAM(S): 10 SOLUTION ORAL at 17:39

## 2023-01-01 RX ADMIN — Medication 25 MILLIGRAM(S): at 21:16

## 2023-01-01 RX ADMIN — HEPARIN SODIUM 5000 UNIT(S): 5000 INJECTION INTRAVENOUS; SUBCUTANEOUS at 10:12

## 2023-01-01 RX ADMIN — AMIODARONE HYDROCHLORIDE 200 MILLIGRAM(S): 400 TABLET ORAL at 05:22

## 2023-01-01 RX ADMIN — Medication 2 TABLET(S): at 22:39

## 2023-01-01 RX ADMIN — I.V. FAT EMULSION 20.83 GM/KG/DAY: 20 EMULSION INTRAVENOUS at 17:38

## 2023-01-01 RX ADMIN — Medication 2 TABLET(S): at 07:18

## 2023-01-01 RX ADMIN — Medication 2 TABLET(S): at 18:05

## 2023-01-01 RX ADMIN — Medication 250 MILLIGRAM(S): at 10:47

## 2023-01-01 RX ADMIN — URSODIOL 300 MILLIGRAM(S): 250 TABLET, FILM COATED ORAL at 22:47

## 2023-01-01 RX ADMIN — PANTOPRAZOLE SODIUM 40 MILLIGRAM(S): 20 TABLET, DELAYED RELEASE ORAL at 09:59

## 2023-01-01 RX ADMIN — MORPHINE 6 MILLIGRAM(S): 10 SOLUTION ORAL at 23:46

## 2023-01-01 RX ADMIN — HYDROMORPHONE HYDROCHLORIDE 0.5 MILLIGRAM(S): 2 INJECTION INTRAMUSCULAR; INTRAVENOUS; SUBCUTANEOUS at 11:40

## 2023-01-01 RX ADMIN — HEPARIN SODIUM 5000 UNIT(S): 5000 INJECTION INTRAVENOUS; SUBCUTANEOUS at 22:17

## 2023-01-01 RX ADMIN — Medication 50 MICROGRAM(S): at 05:31

## 2023-01-01 RX ADMIN — Medication 1 SPRAY(S): at 07:19

## 2023-01-01 RX ADMIN — Medication 150 MILLIGRAM(S): at 11:51

## 2023-01-01 RX ADMIN — SODIUM CHLORIDE 500 MILLILITER(S): 9 INJECTION INTRAMUSCULAR; INTRAVENOUS; SUBCUTANEOUS at 18:03

## 2023-01-01 RX ADMIN — HYDROMORPHONE HYDROCHLORIDE 0.5 MILLIGRAM(S): 2 INJECTION INTRAMUSCULAR; INTRAVENOUS; SUBCUTANEOUS at 16:04

## 2023-01-01 RX ADMIN — CHLORHEXIDINE GLUCONATE 15 MILLILITER(S): 213 SOLUTION TOPICAL at 17:26

## 2023-01-01 RX ADMIN — URSODIOL 300 MILLIGRAM(S): 250 TABLET, FILM COATED ORAL at 13:22

## 2023-01-01 RX ADMIN — URSODIOL 300 MILLIGRAM(S): 250 TABLET, FILM COATED ORAL at 14:17

## 2023-01-01 RX ADMIN — OCTREOTIDE ACETATE 100 MICROGRAM(S): 200 INJECTION, SOLUTION INTRAVENOUS; SUBCUTANEOUS at 09:59

## 2023-01-01 RX ADMIN — HEPARIN SODIUM 5000 UNIT(S): 5000 INJECTION INTRAVENOUS; SUBCUTANEOUS at 13:49

## 2023-01-01 RX ADMIN — Medication 100 MILLIEQUIVALENT(S): at 13:45

## 2023-01-01 RX ADMIN — SODIUM CHLORIDE 1000 MILLILITER(S): 9 INJECTION INTRAMUSCULAR; INTRAVENOUS; SUBCUTANEOUS at 09:20

## 2023-01-01 RX ADMIN — Medication 2: at 13:00

## 2023-01-01 RX ADMIN — FENTANYL CITRATE 5.05 MICROGRAM(S)/KG/HR: 50 INJECTION INTRAVENOUS at 18:07

## 2023-01-01 RX ADMIN — CHOLESTYRAMINE 4 GRAM(S): 4 POWDER, FOR SUSPENSION ORAL at 15:08

## 2023-01-01 RX ADMIN — Medication 10 MILLIGRAM(S): at 18:18

## 2023-01-01 RX ADMIN — Medication 40 MICROGRAM(S): at 08:20

## 2023-01-01 RX ADMIN — Medication 4 MILLIGRAM(S): at 00:38

## 2023-01-01 RX ADMIN — CHLORHEXIDINE GLUCONATE 15 MILLILITER(S): 213 SOLUTION TOPICAL at 17:05

## 2023-01-01 RX ADMIN — Medication 4 MILLIGRAM(S): at 05:28

## 2023-01-01 RX ADMIN — Medication 20 MILLIEQUIVALENT(S): at 11:02

## 2023-01-01 RX ADMIN — Medication 4 MILLIGRAM(S): at 06:51

## 2023-01-01 RX ADMIN — Medication 250 MILLIGRAM(S): at 18:44

## 2023-01-01 RX ADMIN — Medication 300 MILLIGRAM(S): at 11:12

## 2023-01-01 RX ADMIN — Medication 4 MILLIGRAM(S): at 23:32

## 2023-01-01 RX ADMIN — Medication 25 MILLIGRAM(S): at 22:31

## 2023-01-01 RX ADMIN — URSODIOL 300 MILLIGRAM(S): 250 TABLET, FILM COATED ORAL at 23:32

## 2023-01-01 RX ADMIN — NYSTATIN CREAM 1 APPLICATION(S): 100000 CREAM TOPICAL at 21:37

## 2023-01-01 RX ADMIN — CHOLESTYRAMINE 4 GRAM(S): 4 POWDER, FOR SUSPENSION ORAL at 10:08

## 2023-01-01 RX ADMIN — NYSTATIN CREAM 1 APPLICATION(S): 100000 CREAM TOPICAL at 21:57

## 2023-01-01 RX ADMIN — Medication 30 MICROGRAM(S): at 22:41

## 2023-01-01 RX ADMIN — HEPARIN SODIUM 5000 UNIT(S): 5000 INJECTION INTRAVENOUS; SUBCUTANEOUS at 14:28

## 2023-01-01 RX ADMIN — Medication 5 MILLIGRAM(S): at 05:53

## 2023-01-01 RX ADMIN — HEPARIN SODIUM 5000 UNIT(S): 5000 INJECTION INTRAVENOUS; SUBCUTANEOUS at 21:08

## 2023-01-01 RX ADMIN — OCTREOTIDE ACETATE 100 MICROGRAM(S): 200 INJECTION, SOLUTION INTRAVENOUS; SUBCUTANEOUS at 09:51

## 2023-01-01 RX ADMIN — Medication 4 MILLIGRAM(S): at 12:41

## 2023-01-01 RX ADMIN — HEPARIN SODIUM 5000 UNIT(S): 5000 INJECTION INTRAVENOUS; SUBCUTANEOUS at 05:49

## 2023-01-01 RX ADMIN — HEPARIN SODIUM 5000 UNIT(S): 5000 INJECTION INTRAVENOUS; SUBCUTANEOUS at 14:23

## 2023-01-01 RX ADMIN — SODIUM CHLORIDE 130 MILLILITER(S): 9 INJECTION, SOLUTION INTRAVENOUS at 17:04

## 2023-01-01 RX ADMIN — I.V. FAT EMULSION 20.8 GM/KG/DAY: 20 EMULSION INTRAVENOUS at 17:48

## 2023-01-01 RX ADMIN — GABAPENTIN 100 MILLIGRAM(S): 400 CAPSULE ORAL at 21:11

## 2023-01-01 RX ADMIN — OCTREOTIDE ACETATE 100 MICROGRAM(S): 200 INJECTION, SOLUTION INTRAVENOUS; SUBCUTANEOUS at 00:00

## 2023-01-01 RX ADMIN — OCTREOTIDE ACETATE 100 MICROGRAM(S): 200 INJECTION, SOLUTION INTRAVENOUS; SUBCUTANEOUS at 10:57

## 2023-01-01 RX ADMIN — Medication 10 MILLIGRAM(S): at 23:43

## 2023-01-01 RX ADMIN — HEPARIN SODIUM 5000 UNIT(S): 5000 INJECTION INTRAVENOUS; SUBCUTANEOUS at 22:43

## 2023-01-01 RX ADMIN — Medication 4 MILLIGRAM(S): at 05:18

## 2023-01-01 RX ADMIN — Medication 5 MILLIGRAM(S): at 18:13

## 2023-01-01 RX ADMIN — Medication 50 MICROGRAM(S): at 09:39

## 2023-01-01 RX ADMIN — NYSTATIN CREAM 1 APPLICATION(S): 100000 CREAM TOPICAL at 06:04

## 2023-01-01 RX ADMIN — ENOXAPARIN SODIUM 100 MILLIGRAM(S): 100 INJECTION SUBCUTANEOUS at 17:38

## 2023-01-01 RX ADMIN — MORPHINE 6 MILLIGRAM(S): 10 SOLUTION ORAL at 06:46

## 2023-01-01 RX ADMIN — Medication 2 TABLET(S): at 23:46

## 2023-01-01 RX ADMIN — PANTOPRAZOLE SODIUM 40 MILLIGRAM(S): 20 TABLET, DELAYED RELEASE ORAL at 09:48

## 2023-01-01 RX ADMIN — Medication 30 MICROGRAM(S): at 21:36

## 2023-01-01 RX ADMIN — Medication 10 MILLIGRAM(S): at 06:49

## 2023-01-01 RX ADMIN — CHLORHEXIDINE GLUCONATE 1 APPLICATION(S): 213 SOLUTION TOPICAL at 06:04

## 2023-01-01 RX ADMIN — SODIUM CHLORIDE 1000 MILLILITER(S): 9 INJECTION INTRAMUSCULAR; INTRAVENOUS; SUBCUTANEOUS at 10:10

## 2023-01-01 RX ADMIN — Medication 150 MILLIGRAM(S): at 05:51

## 2023-01-01 RX ADMIN — Medication 2 TABLET(S): at 12:00

## 2023-01-01 RX ADMIN — OCTREOTIDE ACETATE 100 MICROGRAM(S): 200 INJECTION, SOLUTION INTRAVENOUS; SUBCUTANEOUS at 07:24

## 2023-01-01 RX ADMIN — MORPHINE 6 MILLIGRAM(S): 10 SOLUTION ORAL at 18:15

## 2023-01-01 RX ADMIN — CHLORHEXIDINE GLUCONATE 1 APPLICATION(S): 213 SOLUTION TOPICAL at 07:13

## 2023-01-01 RX ADMIN — ENOXAPARIN SODIUM 90 MILLIGRAM(S): 100 INJECTION SUBCUTANEOUS at 18:32

## 2023-01-01 RX ADMIN — Medication 1 SPRAY(S): at 22:01

## 2023-01-01 RX ADMIN — I.V. FAT EMULSION 20.83 GM/KG/DAY: 20 EMULSION INTRAVENOUS at 17:27

## 2023-01-01 RX ADMIN — LOSARTAN POTASSIUM 25 MILLIGRAM(S): 100 TABLET, FILM COATED ORAL at 08:13

## 2023-01-01 RX ADMIN — Medication 5 MILLIGRAM(S): at 23:32

## 2023-01-01 RX ADMIN — Medication 5 MILLIGRAM(S): at 21:14

## 2023-01-01 RX ADMIN — Medication 5 MILLIGRAM(S): at 05:38

## 2023-01-01 RX ADMIN — AMIODARONE HYDROCHLORIDE 200 MILLIGRAM(S): 400 TABLET ORAL at 06:19

## 2023-01-01 RX ADMIN — I.V. FAT EMULSION 20.83 GM/KG/DAY: 20 EMULSION INTRAVENOUS at 17:40

## 2023-01-01 RX ADMIN — NYSTATIN CREAM 1 APPLICATION(S): 100000 CREAM TOPICAL at 22:45

## 2023-01-01 RX ADMIN — HEPARIN SODIUM 5000 UNIT(S): 5000 INJECTION INTRAVENOUS; SUBCUTANEOUS at 06:18

## 2023-01-01 RX ADMIN — CHOLESTYRAMINE 4 GRAM(S): 4 POWDER, FOR SUSPENSION ORAL at 18:03

## 2023-01-01 RX ADMIN — AMLODIPINE BESYLATE 10 MILLIGRAM(S): 2.5 TABLET ORAL at 06:11

## 2023-01-01 RX ADMIN — SODIUM CHLORIDE 1000 MILLILITER(S): 9 INJECTION, SOLUTION INTRAVENOUS at 18:23

## 2023-01-01 RX ADMIN — Medication 25 MILLIGRAM(S): at 22:07

## 2023-01-01 RX ADMIN — Medication 40 MICROGRAM(S): at 23:16

## 2023-01-01 RX ADMIN — CHOLESTYRAMINE 4 GRAM(S): 4 POWDER, FOR SUSPENSION ORAL at 10:25

## 2023-01-01 RX ADMIN — Medication 50 MICROGRAM(S): at 05:26

## 2023-01-01 RX ADMIN — Medication 10 MILLIGRAM(S): at 04:13

## 2023-01-01 RX ADMIN — HEPARIN SODIUM 5000 UNIT(S): 5000 INJECTION INTRAVENOUS; SUBCUTANEOUS at 06:01

## 2023-01-01 RX ADMIN — CEFEPIME 100 MILLIGRAM(S): 1 INJECTION, POWDER, FOR SOLUTION INTRAMUSCULAR; INTRAVENOUS at 02:15

## 2023-01-01 RX ADMIN — SODIUM CHLORIDE 2000 MILLILITER(S): 9 INJECTION, SOLUTION INTRAVENOUS at 17:08

## 2023-01-01 RX ADMIN — ENOXAPARIN SODIUM 100 MILLIGRAM(S): 100 INJECTION SUBCUTANEOUS at 05:55

## 2023-01-01 RX ADMIN — Medication 2 TABLET(S): at 19:08

## 2023-01-01 RX ADMIN — Medication 50 MICROGRAM(S): at 06:35

## 2023-01-01 RX ADMIN — Medication 20 MILLIGRAM(S): at 19:23

## 2023-01-01 RX ADMIN — URSODIOL 300 MILLIGRAM(S): 250 TABLET, FILM COATED ORAL at 21:00

## 2023-01-01 RX ADMIN — MORPHINE 6 MILLIGRAM(S): 10 SOLUTION ORAL at 05:14

## 2023-01-01 RX ADMIN — Medication 50 MICROGRAM(S): at 06:21

## 2023-01-01 RX ADMIN — Medication 2 TABLET(S): at 11:29

## 2023-01-01 RX ADMIN — SODIUM CHLORIDE 2000 MILLILITER(S): 9 INJECTION, SOLUTION INTRAVENOUS at 04:49

## 2023-01-01 RX ADMIN — URSODIOL 300 MILLIGRAM(S): 250 TABLET, FILM COATED ORAL at 05:39

## 2023-01-01 RX ADMIN — Medication 25 MILLIGRAM(S): at 10:00

## 2023-01-01 RX ADMIN — Medication 2 TABLET(S): at 10:24

## 2023-01-01 RX ADMIN — CHOLESTYRAMINE 4 GRAM(S): 4 POWDER, FOR SUSPENSION ORAL at 17:37

## 2023-01-01 RX ADMIN — URSODIOL 300 MILLIGRAM(S): 250 TABLET, FILM COATED ORAL at 22:21

## 2023-01-01 RX ADMIN — CHLORHEXIDINE GLUCONATE 1 APPLICATION(S): 213 SOLUTION TOPICAL at 05:59

## 2023-01-01 RX ADMIN — HYDROMORPHONE HYDROCHLORIDE 0.5 MILLIGRAM(S): 2 INJECTION INTRAMUSCULAR; INTRAVENOUS; SUBCUTANEOUS at 08:34

## 2023-01-01 RX ADMIN — Medication 50 MICROGRAM(S): at 06:41

## 2023-01-01 RX ADMIN — I.V. FAT EMULSION 20.83 GM/KG/DAY: 20 EMULSION INTRAVENOUS at 17:50

## 2023-01-01 RX ADMIN — SODIUM CHLORIDE 110 MILLILITER(S): 9 INJECTION, SOLUTION INTRAVENOUS at 21:45

## 2023-01-01 RX ADMIN — PANTOPRAZOLE SODIUM 40 MILLIGRAM(S): 20 TABLET, DELAYED RELEASE ORAL at 10:05

## 2023-01-01 RX ADMIN — Medication 4 MILLIGRAM(S): at 18:48

## 2023-01-01 RX ADMIN — Medication 1 EACH: at 18:20

## 2023-01-01 RX ADMIN — SODIUM CHLORIDE 130 MILLILITER(S): 9 INJECTION, SOLUTION INTRAVENOUS at 03:43

## 2023-01-01 RX ADMIN — Medication 5 MILLIGRAM(S): at 11:28

## 2023-01-01 RX ADMIN — Medication 25 MILLIGRAM(S): at 21:51

## 2023-01-01 RX ADMIN — HEPARIN SODIUM 5000 UNIT(S): 5000 INJECTION INTRAVENOUS; SUBCUTANEOUS at 05:40

## 2023-01-01 RX ADMIN — OCTREOTIDE ACETATE 100 MICROGRAM(S): 200 INJECTION, SOLUTION INTRAVENOUS; SUBCUTANEOUS at 04:30

## 2023-01-01 RX ADMIN — ENOXAPARIN SODIUM 100 MILLIGRAM(S): 100 INJECTION SUBCUTANEOUS at 06:00

## 2023-01-01 RX ADMIN — LOSARTAN POTASSIUM 25 MILLIGRAM(S): 100 TABLET, FILM COATED ORAL at 06:30

## 2023-01-01 RX ADMIN — Medication 100 MILLIGRAM(S): at 22:30

## 2023-01-01 RX ADMIN — Medication 2 TABLET(S): at 06:10

## 2023-01-01 RX ADMIN — ONDANSETRON 4 MILLIGRAM(S): 8 TABLET, FILM COATED ORAL at 10:46

## 2023-01-01 RX ADMIN — Medication 4 MILLIGRAM(S): at 05:37

## 2023-01-01 RX ADMIN — SODIUM CHLORIDE 1000 MILLILITER(S): 9 INJECTION INTRAMUSCULAR; INTRAVENOUS; SUBCUTANEOUS at 18:07

## 2023-01-01 RX ADMIN — HEPARIN SODIUM 5000 UNIT(S): 5000 INJECTION INTRAVENOUS; SUBCUTANEOUS at 14:52

## 2023-01-01 RX ADMIN — Medication 20 MILLIGRAM(S): at 18:50

## 2023-01-01 RX ADMIN — NYSTATIN CREAM 1 APPLICATION(S): 100000 CREAM TOPICAL at 14:13

## 2023-01-01 RX ADMIN — HEPARIN SODIUM 5000 UNIT(S): 5000 INJECTION INTRAVENOUS; SUBCUTANEOUS at 14:27

## 2023-01-01 RX ADMIN — Medication 4 MILLIGRAM(S): at 15:38

## 2023-01-01 RX ADMIN — Medication 40 MILLIEQUIVALENT(S): at 08:29

## 2023-01-01 RX ADMIN — Medication 100 GRAM(S): at 09:35

## 2023-01-01 RX ADMIN — HEPARIN SODIUM 5000 UNIT(S): 5000 INJECTION INTRAVENOUS; SUBCUTANEOUS at 23:01

## 2023-01-01 RX ADMIN — Medication 1 EACH: at 17:38

## 2023-01-01 RX ADMIN — Medication 15 MILLIGRAM(S): at 07:30

## 2023-01-01 RX ADMIN — OCTREOTIDE ACETATE 100 MICROGRAM(S): 200 INJECTION, SOLUTION INTRAVENOUS; SUBCUTANEOUS at 08:48

## 2023-01-01 RX ADMIN — CHLORHEXIDINE GLUCONATE 15 MILLILITER(S): 213 SOLUTION TOPICAL at 17:27

## 2023-01-01 RX ADMIN — URSODIOL 300 MILLIGRAM(S): 250 TABLET, FILM COATED ORAL at 22:59

## 2023-01-01 RX ADMIN — Medication 2 TABLET(S): at 01:08

## 2023-01-01 RX ADMIN — Medication 5 MILLIGRAM(S): at 22:32

## 2023-01-01 RX ADMIN — Medication 5 MILLIGRAM(S): at 11:22

## 2023-01-01 RX ADMIN — Medication 4 MILLIGRAM(S): at 11:42

## 2023-01-01 RX ADMIN — Medication 150 MILLIGRAM(S): at 18:43

## 2023-01-01 RX ADMIN — HYDROMORPHONE HYDROCHLORIDE 0.5 MILLIGRAM(S): 2 INJECTION INTRAMUSCULAR; INTRAVENOUS; SUBCUTANEOUS at 11:21

## 2023-01-01 RX ADMIN — Medication 5 MILLIGRAM(S): at 01:00

## 2023-01-01 RX ADMIN — Medication 4 MILLIGRAM(S): at 06:34

## 2023-01-01 RX ADMIN — Medication 4 MILLIGRAM(S): at 23:59

## 2023-01-01 RX ADMIN — Medication 150 MILLIGRAM(S): at 05:30

## 2023-01-01 RX ADMIN — Medication 4 MILLIGRAM(S): at 21:41

## 2023-01-01 RX ADMIN — I.V. FAT EMULSION 41.67 GM/KG/DAY: 20 EMULSION INTRAVENOUS at 17:43

## 2023-01-01 RX ADMIN — Medication 1 EACH: at 18:35

## 2023-01-01 RX ADMIN — LOSARTAN POTASSIUM 25 MILLIGRAM(S): 100 TABLET, FILM COATED ORAL at 07:11

## 2023-01-01 RX ADMIN — Medication 50 MILLIEQUIVALENT(S): at 10:12

## 2023-01-01 RX ADMIN — Medication 4 MILLIGRAM(S): at 17:38

## 2023-01-01 RX ADMIN — Medication 5 MILLIGRAM(S): at 12:07

## 2023-01-01 RX ADMIN — I.V. FAT EMULSION 42.1 GM/KG/DAY: 20 EMULSION INTRAVENOUS at 18:08

## 2023-01-01 RX ADMIN — URSODIOL 300 MILLIGRAM(S): 250 TABLET, FILM COATED ORAL at 15:20

## 2023-01-01 RX ADMIN — URSODIOL 300 MILLIGRAM(S): 250 TABLET, FILM COATED ORAL at 15:12

## 2023-01-01 RX ADMIN — OCTREOTIDE ACETATE 100 MICROGRAM(S): 200 INJECTION, SOLUTION INTRAVENOUS; SUBCUTANEOUS at 02:30

## 2023-01-01 RX ADMIN — Medication 10 MILLIGRAM(S): at 17:14

## 2023-01-01 RX ADMIN — CHOLESTYRAMINE 4 GRAM(S): 4 POWDER, FOR SUSPENSION ORAL at 09:13

## 2023-01-01 RX ADMIN — IMIPENEM AND CILASTATIN 250 MILLIGRAM(S): 250; 250 INJECTION, POWDER, FOR SOLUTION INTRAVENOUS at 02:36

## 2023-01-01 RX ADMIN — Medication 50 MICROGRAM(S): at 05:15

## 2023-01-01 RX ADMIN — PANTOPRAZOLE SODIUM 40 MILLIGRAM(S): 20 TABLET, DELAYED RELEASE ORAL at 09:10

## 2023-01-01 RX ADMIN — Medication 4 MILLIGRAM(S): at 23:55

## 2023-01-01 RX ADMIN — HYDROMORPHONE HYDROCHLORIDE 0.5 MILLIGRAM(S): 2 INJECTION INTRAMUSCULAR; INTRAVENOUS; SUBCUTANEOUS at 15:42

## 2023-01-01 RX ADMIN — SODIUM CHLORIDE 1000 MILLILITER(S): 9 INJECTION INTRAMUSCULAR; INTRAVENOUS; SUBCUTANEOUS at 19:11

## 2023-01-01 RX ADMIN — PANTOPRAZOLE SODIUM 40 MILLIGRAM(S): 20 TABLET, DELAYED RELEASE ORAL at 11:25

## 2023-01-01 RX ADMIN — Medication 300 MILLIGRAM(S): at 09:43

## 2023-01-01 RX ADMIN — MORPHINE 6 MILLIGRAM(S): 10 SOLUTION ORAL at 05:54

## 2023-01-01 RX ADMIN — Medication 100 MILLIGRAM(S): at 22:52

## 2023-01-01 RX ADMIN — Medication 150 MILLIGRAM(S): at 19:08

## 2023-01-01 RX ADMIN — CEFEPIME 100 MILLIGRAM(S): 1 INJECTION, POWDER, FOR SOLUTION INTRAMUSCULAR; INTRAVENOUS at 00:03

## 2023-01-01 RX ADMIN — Medication 25 MILLIGRAM(S): at 21:50

## 2023-01-01 RX ADMIN — BENZOCAINE AND MENTHOL 1 LOZENGE: 5; 1 LIQUID ORAL at 08:25

## 2023-01-01 RX ADMIN — SODIUM CHLORIDE 130 MILLILITER(S): 9 INJECTION, SOLUTION INTRAVENOUS at 17:14

## 2023-01-01 RX ADMIN — Medication 400 MILLIGRAM(S): at 15:12

## 2023-01-01 RX ADMIN — Medication 400 MILLIGRAM(S): at 23:09

## 2023-01-01 RX ADMIN — Medication 10 MILLIGRAM(S): at 11:12

## 2023-01-01 RX ADMIN — Medication 2 TABLET(S): at 17:10

## 2023-01-01 RX ADMIN — NYSTATIN CREAM 1 APPLICATION(S): 100000 CREAM TOPICAL at 00:44

## 2023-01-01 RX ADMIN — CHLORHEXIDINE GLUCONATE 15 MILLILITER(S): 213 SOLUTION TOPICAL at 05:40

## 2023-01-01 RX ADMIN — Medication 100 MILLIGRAM(S): at 23:39

## 2023-01-01 RX ADMIN — AMIODARONE HYDROCHLORIDE 200 MILLIGRAM(S): 400 TABLET ORAL at 05:01

## 2023-01-01 RX ADMIN — Medication 2 TABLET(S): at 06:13

## 2023-01-01 RX ADMIN — Medication 10 MILLIGRAM(S): at 23:41

## 2023-01-01 RX ADMIN — Medication 40 MICROGRAM(S): at 23:14

## 2023-01-01 RX ADMIN — MORPHINE 6 MILLIGRAM(S): 10 SOLUTION ORAL at 23:32

## 2023-01-01 RX ADMIN — Medication 2 TABLET(S): at 10:39

## 2023-01-01 RX ADMIN — AMIODARONE HYDROCHLORIDE 16.7 MG/MIN: 400 TABLET ORAL at 00:22

## 2023-01-01 RX ADMIN — URSODIOL 300 MILLIGRAM(S): 250 TABLET, FILM COATED ORAL at 06:15

## 2023-01-01 RX ADMIN — HEPARIN SODIUM 5000 UNIT(S): 5000 INJECTION INTRAVENOUS; SUBCUTANEOUS at 06:40

## 2023-01-01 RX ADMIN — NYSTATIN CREAM 1 APPLICATION(S): 100000 CREAM TOPICAL at 22:54

## 2023-01-01 RX ADMIN — Medication 0.25 MILLIGRAM(S): at 06:24

## 2023-01-01 RX ADMIN — AMLODIPINE BESYLATE 10 MILLIGRAM(S): 2.5 TABLET ORAL at 05:09

## 2023-01-01 RX ADMIN — CHLORHEXIDINE GLUCONATE 15 MILLILITER(S): 213 SOLUTION TOPICAL at 17:08

## 2023-01-01 RX ADMIN — AMLODIPINE BESYLATE 10 MILLIGRAM(S): 2.5 TABLET ORAL at 06:21

## 2023-01-01 RX ADMIN — MORPHINE 6 MILLIGRAM(S): 10 SOLUTION ORAL at 17:12

## 2023-01-01 RX ADMIN — MORPHINE 6 MILLIGRAM(S): 10 SOLUTION ORAL at 05:12

## 2023-01-01 RX ADMIN — FAMOTIDINE 20 MILLIGRAM(S): 10 INJECTION INTRAVENOUS at 11:12

## 2023-01-01 RX ADMIN — Medication 2 TABLET(S): at 09:32

## 2023-01-01 RX ADMIN — HEPARIN SODIUM 5000 UNIT(S): 5000 INJECTION INTRAVENOUS; SUBCUTANEOUS at 06:50

## 2023-01-01 RX ADMIN — HEPARIN SODIUM 5000 UNIT(S): 5000 INJECTION INTRAVENOUS; SUBCUTANEOUS at 05:11

## 2023-01-01 RX ADMIN — HYDROMORPHONE HYDROCHLORIDE 0.5 MILLIGRAM(S): 2 INJECTION INTRAMUSCULAR; INTRAVENOUS; SUBCUTANEOUS at 14:17

## 2023-01-01 RX ADMIN — HEPARIN SODIUM 5000 UNIT(S): 5000 INJECTION INTRAVENOUS; SUBCUTANEOUS at 13:38

## 2023-01-01 RX ADMIN — Medication 4 MILLIGRAM(S): at 17:20

## 2023-01-01 RX ADMIN — Medication 50 MICROGRAM(S): at 05:35

## 2023-01-01 RX ADMIN — Medication 40 MICROGRAM(S): at 01:14

## 2023-01-01 RX ADMIN — Medication 83 EACH: at 17:16

## 2023-01-01 RX ADMIN — ENOXAPARIN SODIUM 100 MILLIGRAM(S): 100 INJECTION SUBCUTANEOUS at 06:28

## 2023-01-01 RX ADMIN — OCTREOTIDE ACETATE 100 MICROGRAM(S): 200 INJECTION, SOLUTION INTRAVENOUS; SUBCUTANEOUS at 00:11

## 2023-01-01 RX ADMIN — URSODIOL 300 MILLIGRAM(S): 250 TABLET, FILM COATED ORAL at 22:13

## 2023-01-01 RX ADMIN — Medication 40 MICROGRAM(S): at 21:46

## 2023-01-01 RX ADMIN — HEPARIN SODIUM 5000 UNIT(S): 5000 INJECTION INTRAVENOUS; SUBCUTANEOUS at 15:50

## 2023-01-01 RX ADMIN — NYSTATIN CREAM 1 APPLICATION(S): 100000 CREAM TOPICAL at 21:40

## 2023-01-01 RX ADMIN — CEFEPIME 100 MILLIGRAM(S): 1 INJECTION, POWDER, FOR SOLUTION INTRAMUSCULAR; INTRAVENOUS at 09:18

## 2023-01-01 RX ADMIN — AMLODIPINE BESYLATE 10 MILLIGRAM(S): 2.5 TABLET ORAL at 05:59

## 2023-01-01 RX ADMIN — CHLORHEXIDINE GLUCONATE 1 APPLICATION(S): 213 SOLUTION TOPICAL at 05:13

## 2023-01-01 RX ADMIN — Medication 5 MILLIGRAM(S): at 00:07

## 2023-01-01 RX ADMIN — Medication 4 MILLIGRAM(S): at 17:08

## 2023-01-01 RX ADMIN — ATORVASTATIN CALCIUM 20 MILLIGRAM(S): 80 TABLET, FILM COATED ORAL at 21:21

## 2023-01-01 RX ADMIN — Medication 5 MILLIGRAM(S): at 05:23

## 2023-01-01 RX ADMIN — Medication 4 MILLIGRAM(S): at 10:18

## 2023-01-01 RX ADMIN — URSODIOL 300 MILLIGRAM(S): 250 TABLET, FILM COATED ORAL at 13:02

## 2023-01-01 RX ADMIN — MORPHINE 6 MILLIGRAM(S): 10 SOLUTION ORAL at 17:05

## 2023-01-01 RX ADMIN — Medication 5 MILLIGRAM(S): at 18:12

## 2023-01-01 RX ADMIN — Medication 4 MILLIGRAM(S): at 06:30

## 2023-01-01 RX ADMIN — I.V. FAT EMULSION 20.83 GM/KG/DAY: 20 EMULSION INTRAVENOUS at 18:34

## 2023-01-01 RX ADMIN — AMLODIPINE BESYLATE 10 MILLIGRAM(S): 2.5 TABLET ORAL at 08:17

## 2023-01-01 RX ADMIN — I.V. FAT EMULSION 41.67 GM/KG/DAY: 20 EMULSION INTRAVENOUS at 18:14

## 2023-01-01 RX ADMIN — CHLORHEXIDINE GLUCONATE 1 APPLICATION(S): 213 SOLUTION TOPICAL at 11:33

## 2023-01-01 RX ADMIN — HEPARIN SODIUM 5000 UNIT(S): 5000 INJECTION INTRAVENOUS; SUBCUTANEOUS at 06:34

## 2023-01-01 RX ADMIN — HYDROMORPHONE HYDROCHLORIDE 0.5 MILLIGRAM(S): 2 INJECTION INTRAMUSCULAR; INTRAVENOUS; SUBCUTANEOUS at 17:18

## 2023-01-01 RX ADMIN — Medication 30 MICROGRAM(S): at 21:21

## 2023-01-01 RX ADMIN — Medication 10.6 MICROGRAM(S)/KG/MIN: at 05:17

## 2023-01-01 RX ADMIN — PANTOPRAZOLE SODIUM 40 MILLIGRAM(S): 20 TABLET, DELAYED RELEASE ORAL at 09:16

## 2023-01-01 RX ADMIN — Medication 1 SPRAY(S): at 05:39

## 2023-01-01 RX ADMIN — I.V. FAT EMULSION 41.67 GM/KG/DAY: 20 EMULSION INTRAVENOUS at 17:04

## 2023-01-01 RX ADMIN — URSODIOL 300 MILLIGRAM(S): 250 TABLET, FILM COATED ORAL at 05:37

## 2023-01-01 RX ADMIN — MORPHINE 6 MILLIGRAM(S): 10 SOLUTION ORAL at 06:30

## 2023-01-01 RX ADMIN — Medication 2 TABLET(S): at 06:52

## 2023-01-01 RX ADMIN — Medication 100 MILLIGRAM(S): at 21:55

## 2023-01-01 RX ADMIN — Medication 4 MILLIGRAM(S): at 12:32

## 2023-01-01 RX ADMIN — HEPARIN SODIUM 5000 UNIT(S): 5000 INJECTION INTRAVENOUS; SUBCUTANEOUS at 05:09

## 2023-01-01 RX ADMIN — CHLORHEXIDINE GLUCONATE 1 APPLICATION(S): 213 SOLUTION TOPICAL at 06:19

## 2023-01-01 RX ADMIN — Medication 50 MILLILITER(S): at 22:00

## 2023-01-01 RX ADMIN — URSODIOL 300 MILLIGRAM(S): 250 TABLET, FILM COATED ORAL at 13:07

## 2023-01-01 RX ADMIN — Medication 5 MILLIGRAM(S): at 18:45

## 2023-01-01 RX ADMIN — Medication 25 MILLIGRAM(S): at 10:32

## 2023-01-01 RX ADMIN — Medication 1 EACH: at 18:52

## 2023-01-01 RX ADMIN — Medication 2 TABLET(S): at 12:58

## 2023-01-01 RX ADMIN — Medication 5 MILLIGRAM(S): at 05:21

## 2023-01-01 RX ADMIN — Medication 10 MILLIGRAM(S): at 18:23

## 2023-01-01 RX ADMIN — URSODIOL 300 MILLIGRAM(S): 250 TABLET, FILM COATED ORAL at 05:25

## 2023-01-01 RX ADMIN — Medication 4 MILLIGRAM(S): at 12:05

## 2023-01-01 RX ADMIN — CHOLESTYRAMINE 4 GRAM(S): 4 POWDER, FOR SUSPENSION ORAL at 10:00

## 2023-01-01 RX ADMIN — CHOLESTYRAMINE 4 GRAM(S): 4 POWDER, FOR SUSPENSION ORAL at 10:23

## 2023-01-01 RX ADMIN — Medication 25 MILLIGRAM(S): at 07:03

## 2023-01-01 RX ADMIN — IMIPENEM AND CILASTATIN 250 MILLIGRAM(S): 250; 250 INJECTION, POWDER, FOR SOLUTION INTRAVENOUS at 05:03

## 2023-01-01 RX ADMIN — CHLORHEXIDINE GLUCONATE 1 APPLICATION(S): 213 SOLUTION TOPICAL at 06:25

## 2023-01-01 RX ADMIN — Medication 5 MILLIGRAM(S): at 22:58

## 2023-01-01 RX ADMIN — Medication 4 MILLIGRAM(S): at 19:40

## 2023-01-01 RX ADMIN — I.V. FAT EMULSION 20.84 GM/KG/DAY: 20 EMULSION INTRAVENOUS at 17:44

## 2023-01-01 RX ADMIN — Medication 4 MILLIGRAM(S): at 05:14

## 2023-01-01 RX ADMIN — HEPARIN SODIUM 5000 UNIT(S): 5000 INJECTION INTRAVENOUS; SUBCUTANEOUS at 21:42

## 2023-01-01 RX ADMIN — FLUCONAZOLE 100 MILLIGRAM(S): 150 TABLET ORAL at 12:21

## 2023-01-01 RX ADMIN — Medication 2 TABLET(S): at 06:47

## 2023-01-01 RX ADMIN — Medication 1 EACH: at 17:32

## 2023-01-01 RX ADMIN — HEPARIN SODIUM 5000 UNIT(S): 5000 INJECTION INTRAVENOUS; SUBCUTANEOUS at 22:26

## 2023-01-01 RX ADMIN — Medication 10 MILLIGRAM(S): at 03:17

## 2023-01-01 RX ADMIN — HEPARIN SODIUM 5000 UNIT(S): 5000 INJECTION INTRAVENOUS; SUBCUTANEOUS at 05:17

## 2023-01-01 RX ADMIN — MORPHINE 6 MILLIGRAM(S): 10 SOLUTION ORAL at 18:32

## 2023-01-01 RX ADMIN — Medication 40 MICROGRAM(S): at 22:35

## 2023-01-01 RX ADMIN — Medication 50 MILLILITER(S): at 00:09

## 2023-01-01 RX ADMIN — CHLORHEXIDINE GLUCONATE 15 MILLILITER(S): 213 SOLUTION TOPICAL at 06:29

## 2023-01-01 RX ADMIN — Medication 25 MILLIGRAM(S): at 09:28

## 2023-01-01 RX ADMIN — NYSTATIN CREAM 1 APPLICATION(S): 100000 CREAM TOPICAL at 22:31

## 2023-01-01 RX ADMIN — Medication 5 MILLIGRAM(S): at 05:20

## 2023-01-01 RX ADMIN — OCTREOTIDE ACETATE 100 MICROGRAM(S): 200 INJECTION, SOLUTION INTRAVENOUS; SUBCUTANEOUS at 00:01

## 2023-01-01 RX ADMIN — PANTOPRAZOLE SODIUM 40 MILLIGRAM(S): 20 TABLET, DELAYED RELEASE ORAL at 06:00

## 2023-01-01 RX ADMIN — Medication 75 MILLILITER(S): at 21:00

## 2023-01-01 RX ADMIN — LOSARTAN POTASSIUM 25 MILLIGRAM(S): 100 TABLET, FILM COATED ORAL at 05:14

## 2023-01-01 RX ADMIN — Medication 5 MILLIGRAM(S): at 05:28

## 2023-01-01 RX ADMIN — Medication 40 MICROGRAM(S): at 21:45

## 2023-01-01 RX ADMIN — Medication 5 MILLIGRAM(S): at 17:06

## 2023-01-01 RX ADMIN — I.V. FAT EMULSION 20.83 GM/KG/DAY: 20 EMULSION INTRAVENOUS at 18:49

## 2023-01-01 RX ADMIN — Medication 10 MILLIGRAM(S): at 09:56

## 2023-01-01 RX ADMIN — Medication 50 MICROGRAM(S): at 06:53

## 2023-01-01 RX ADMIN — URSODIOL 300 MILLIGRAM(S): 250 TABLET, FILM COATED ORAL at 22:40

## 2023-01-01 RX ADMIN — HYDROMORPHONE HYDROCHLORIDE 0.5 MILLIGRAM(S): 2 INJECTION INTRAMUSCULAR; INTRAVENOUS; SUBCUTANEOUS at 13:25

## 2023-01-01 RX ADMIN — Medication 2 TABLET(S): at 06:33

## 2023-01-01 RX ADMIN — SODIUM CHLORIDE 140 MILLILITER(S): 9 INJECTION, SOLUTION INTRAVENOUS at 00:25

## 2023-01-01 RX ADMIN — Medication 25 MILLIGRAM(S): at 09:56

## 2023-01-01 RX ADMIN — CHOLESTYRAMINE 4 GRAM(S): 4 POWDER, FOR SUSPENSION ORAL at 19:44

## 2023-01-01 RX ADMIN — PANTOPRAZOLE SODIUM 40 MILLIGRAM(S): 20 TABLET, DELAYED RELEASE ORAL at 12:23

## 2023-01-01 RX ADMIN — HEPARIN SODIUM 5000 UNIT(S): 5000 INJECTION INTRAVENOUS; SUBCUTANEOUS at 22:56

## 2023-01-01 RX ADMIN — AMIODARONE HYDROCHLORIDE 200 MILLIGRAM(S): 400 TABLET ORAL at 05:11

## 2023-01-01 RX ADMIN — Medication 4 MILLIGRAM(S): at 18:08

## 2023-01-01 RX ADMIN — Medication 4 MILLIGRAM(S): at 11:38

## 2023-01-01 RX ADMIN — Medication 10 MILLIGRAM(S): at 03:08

## 2023-01-01 RX ADMIN — Medication 25 MILLIGRAM(S): at 21:44

## 2023-01-01 RX ADMIN — HEPARIN SODIUM 5000 UNIT(S): 5000 INJECTION INTRAVENOUS; SUBCUTANEOUS at 05:34

## 2023-01-01 RX ADMIN — MORPHINE 6 MILLIGRAM(S): 10 SOLUTION ORAL at 18:02

## 2023-01-01 RX ADMIN — BENZOCAINE AND MENTHOL 1 LOZENGE: 5; 1 LIQUID ORAL at 20:13

## 2023-01-01 RX ADMIN — HYDROMORPHONE HYDROCHLORIDE 0.5 MILLIGRAM(S): 2 INJECTION INTRAMUSCULAR; INTRAVENOUS; SUBCUTANEOUS at 10:19

## 2023-01-01 RX ADMIN — CHLORHEXIDINE GLUCONATE 1 APPLICATION(S): 213 SOLUTION TOPICAL at 05:55

## 2023-01-01 RX ADMIN — HYDROMORPHONE HYDROCHLORIDE 0.5 MILLIGRAM(S): 2 INJECTION INTRAMUSCULAR; INTRAVENOUS; SUBCUTANEOUS at 00:35

## 2023-01-01 RX ADMIN — Medication 150 MILLIGRAM(S): at 08:22

## 2023-01-01 RX ADMIN — Medication 4 MILLIGRAM(S): at 00:09

## 2023-01-01 RX ADMIN — AMIODARONE HYDROCHLORIDE 200 MILLIGRAM(S): 400 TABLET ORAL at 05:35

## 2023-01-01 RX ADMIN — OCTREOTIDE ACETATE 100 MICROGRAM(S): 200 INJECTION, SOLUTION INTRAVENOUS; SUBCUTANEOUS at 09:41

## 2023-01-01 RX ADMIN — Medication 12.5 MILLILITER(S): at 01:14

## 2023-01-01 RX ADMIN — CHLORHEXIDINE GLUCONATE 1 APPLICATION(S): 213 SOLUTION TOPICAL at 05:07

## 2023-01-01 RX ADMIN — Medication 65 MILLILITER(S): at 16:38

## 2023-01-01 RX ADMIN — Medication 2 TABLET(S): at 23:35

## 2023-01-01 RX ADMIN — NYSTATIN CREAM 1 APPLICATION(S): 100000 CREAM TOPICAL at 22:16

## 2023-01-01 RX ADMIN — SODIUM CHLORIDE 1000 MILLILITER(S): 9 INJECTION, SOLUTION INTRAVENOUS at 05:59

## 2023-01-01 RX ADMIN — ONDANSETRON 4 MILLIGRAM(S): 8 TABLET, FILM COATED ORAL at 19:57

## 2023-01-01 RX ADMIN — Medication 1 EACH: at 17:42

## 2023-01-01 RX ADMIN — Medication 83 EACH: at 19:53

## 2023-01-01 RX ADMIN — Medication 4 MILLIGRAM(S): at 05:40

## 2023-01-01 RX ADMIN — ERYTHROPOIETIN 10000 UNIT(S): 10000 INJECTION, SOLUTION INTRAVENOUS; SUBCUTANEOUS at 11:36

## 2023-01-01 RX ADMIN — MORPHINE 6 MILLIGRAM(S): 10 SOLUTION ORAL at 23:07

## 2023-01-01 RX ADMIN — Medication 100 GRAM(S): at 00:03

## 2023-01-01 RX ADMIN — Medication 300 MILLIGRAM(S): at 10:08

## 2023-01-01 RX ADMIN — CHLORHEXIDINE GLUCONATE 15 MILLILITER(S): 213 SOLUTION TOPICAL at 17:39

## 2023-01-01 RX ADMIN — MORPHINE 6 MILLIGRAM(S): 10 SOLUTION ORAL at 17:03

## 2023-01-01 RX ADMIN — Medication 10 MILLIGRAM(S): at 12:38

## 2023-01-01 RX ADMIN — Medication 4 MILLIGRAM(S): at 17:23

## 2023-01-01 RX ADMIN — Medication 5 MILLIGRAM(S): at 12:11

## 2023-01-01 RX ADMIN — HEPARIN SODIUM 5000 UNIT(S): 5000 INJECTION INTRAVENOUS; SUBCUTANEOUS at 13:26

## 2023-01-01 RX ADMIN — AMLODIPINE BESYLATE 10 MILLIGRAM(S): 2.5 TABLET ORAL at 06:41

## 2023-01-01 RX ADMIN — Medication 10 MILLIGRAM(S): at 00:49

## 2023-01-01 RX ADMIN — CHLORHEXIDINE GLUCONATE 15 MILLILITER(S): 213 SOLUTION TOPICAL at 06:33

## 2023-01-01 RX ADMIN — URSODIOL 300 MILLIGRAM(S): 250 TABLET, FILM COATED ORAL at 06:59

## 2023-01-01 RX ADMIN — URSODIOL 300 MILLIGRAM(S): 250 TABLET, FILM COATED ORAL at 13:53

## 2023-01-01 RX ADMIN — Medication 40 MILLIGRAM(S): at 11:18

## 2023-01-01 RX ADMIN — Medication 25 MILLIGRAM(S): at 12:00

## 2023-01-01 RX ADMIN — Medication 10 MILLIGRAM(S): at 02:15

## 2023-01-01 RX ADMIN — Medication 62.5 MILLIMOLE(S): at 08:14

## 2023-01-01 RX ADMIN — NYSTATIN CREAM 1 APPLICATION(S): 100000 CREAM TOPICAL at 06:14

## 2023-01-01 RX ADMIN — Medication 5 MILLIGRAM(S): at 21:47

## 2023-01-01 RX ADMIN — Medication 100 MILLIEQUIVALENT(S): at 13:46

## 2023-01-01 RX ADMIN — AMLODIPINE BESYLATE 10 MILLIGRAM(S): 2.5 TABLET ORAL at 05:05

## 2023-01-01 RX ADMIN — NYSTATIN CREAM 1 APPLICATION(S): 100000 CREAM TOPICAL at 13:43

## 2023-01-01 RX ADMIN — NYSTATIN CREAM 1 APPLICATION(S): 100000 CREAM TOPICAL at 06:21

## 2023-01-01 RX ADMIN — Medication 25 MILLIGRAM(S): at 21:55

## 2023-01-01 RX ADMIN — Medication 50 MILLILITER(S): at 21:08

## 2023-01-01 RX ADMIN — NYSTATIN CREAM 1 APPLICATION(S): 100000 CREAM TOPICAL at 13:38

## 2023-01-01 RX ADMIN — MORPHINE 6 MILLIGRAM(S): 10 SOLUTION ORAL at 11:26

## 2023-01-01 RX ADMIN — HEPARIN SODIUM 5000 UNIT(S): 5000 INJECTION INTRAVENOUS; SUBCUTANEOUS at 15:30

## 2023-01-01 RX ADMIN — HYDROMORPHONE HYDROCHLORIDE 0.5 MILLIGRAM(S): 2 INJECTION INTRAMUSCULAR; INTRAVENOUS; SUBCUTANEOUS at 09:09

## 2023-01-01 RX ADMIN — Medication 40 MILLIEQUIVALENT(S): at 12:15

## 2023-01-01 RX ADMIN — Medication 5 MILLIGRAM(S): at 05:36

## 2023-01-01 RX ADMIN — Medication 2 TABLET(S): at 06:21

## 2023-01-01 RX ADMIN — Medication 25 MILLIGRAM(S): at 21:09

## 2023-01-01 RX ADMIN — Medication 4 MILLIGRAM(S): at 18:41

## 2023-01-01 RX ADMIN — Medication 2 TABLET(S): at 23:55

## 2023-01-01 RX ADMIN — HEPARIN SODIUM 5000 UNIT(S): 5000 INJECTION INTRAVENOUS; SUBCUTANEOUS at 07:41

## 2023-01-01 RX ADMIN — MORPHINE 6 MILLIGRAM(S): 10 SOLUTION ORAL at 19:30

## 2023-01-01 RX ADMIN — Medication 50 MILLILITER(S): at 06:46

## 2023-01-01 RX ADMIN — CEFEPIME 100 MILLIGRAM(S): 1 INJECTION, POWDER, FOR SOLUTION INTRAMUSCULAR; INTRAVENOUS at 16:18

## 2023-01-01 RX ADMIN — CHOLESTYRAMINE 4 GRAM(S): 4 POWDER, FOR SUSPENSION ORAL at 10:13

## 2023-01-01 RX ADMIN — Medication 4 MILLIGRAM(S): at 17:30

## 2023-01-01 RX ADMIN — Medication 100 GRAM(S): at 12:26

## 2023-01-01 RX ADMIN — NYSTATIN CREAM 1 APPLICATION(S): 100000 CREAM TOPICAL at 21:51

## 2023-01-01 RX ADMIN — Medication 15 MILLIGRAM(S): at 17:31

## 2023-01-01 RX ADMIN — INSULIN HUMAN 10 UNIT(S): 100 INJECTION, SOLUTION SUBCUTANEOUS at 21:49

## 2023-01-01 RX ADMIN — HYDROMORPHONE HYDROCHLORIDE 0.5 MILLIGRAM(S): 2 INJECTION INTRAMUSCULAR; INTRAVENOUS; SUBCUTANEOUS at 11:23

## 2023-01-01 RX ADMIN — Medication 40 MICROGRAM(S): at 06:07

## 2023-01-01 RX ADMIN — Medication 2 TABLET(S): at 12:25

## 2023-01-01 RX ADMIN — Medication 5 MILLIGRAM(S): at 23:56

## 2023-01-01 RX ADMIN — OCTREOTIDE ACETATE 100 MICROGRAM(S): 200 INJECTION, SOLUTION INTRAVENOUS; SUBCUTANEOUS at 18:23

## 2023-01-01 RX ADMIN — I.V. FAT EMULSION 20.83 GM/KG/DAY: 20 EMULSION INTRAVENOUS at 20:21

## 2023-01-01 RX ADMIN — Medication 2 TABLET(S): at 19:45

## 2023-01-01 RX ADMIN — I.V. FAT EMULSION 20.83 GM/KG/DAY: 20 EMULSION INTRAVENOUS at 18:21

## 2023-01-01 RX ADMIN — SODIUM CHLORIDE 1000 MILLILITER(S): 9 INJECTION INTRAMUSCULAR; INTRAVENOUS; SUBCUTANEOUS at 16:31

## 2023-01-01 RX ADMIN — Medication 150 MILLIGRAM(S): at 17:26

## 2023-01-01 RX ADMIN — MORPHINE 6 MILLIGRAM(S): 10 SOLUTION ORAL at 11:52

## 2023-01-01 RX ADMIN — AMIODARONE HYDROCHLORIDE 16.7 MG/MIN: 400 TABLET ORAL at 08:37

## 2023-01-01 RX ADMIN — PANTOPRAZOLE SODIUM 40 MILLIGRAM(S): 20 TABLET, DELAYED RELEASE ORAL at 11:34

## 2023-01-01 RX ADMIN — Medication 250 MILLIGRAM(S): at 09:21

## 2023-01-01 RX ADMIN — Medication 5 MILLIGRAM(S): at 21:08

## 2023-01-01 RX ADMIN — Medication 40 MILLIGRAM(S): at 07:17

## 2023-01-01 RX ADMIN — PANTOPRAZOLE SODIUM 40 MILLIGRAM(S): 20 TABLET, DELAYED RELEASE ORAL at 09:46

## 2023-01-01 RX ADMIN — Medication 75 MILLILITER(S): at 17:25

## 2023-01-01 RX ADMIN — HEPARIN SODIUM 5000 UNIT(S): 5000 INJECTION INTRAVENOUS; SUBCUTANEOUS at 06:25

## 2023-01-01 RX ADMIN — Medication 1 EACH: at 17:21

## 2023-01-01 RX ADMIN — HEPARIN SODIUM 5000 UNIT(S): 5000 INJECTION INTRAVENOUS; SUBCUTANEOUS at 22:24

## 2023-01-01 RX ADMIN — Medication 2 TABLET(S): at 05:18

## 2023-01-01 RX ADMIN — Medication 40 MICROGRAM(S): at 23:02

## 2023-01-01 RX ADMIN — ATORVASTATIN CALCIUM 20 MILLIGRAM(S): 80 TABLET, FILM COATED ORAL at 21:47

## 2023-01-01 RX ADMIN — Medication 150 MILLIGRAM(S): at 18:14

## 2023-01-01 RX ADMIN — SCOPALAMINE 1 PATCH: 1 PATCH, EXTENDED RELEASE TRANSDERMAL at 17:19

## 2023-01-01 RX ADMIN — Medication 5 MILLIGRAM(S): at 11:56

## 2023-01-01 RX ADMIN — Medication 10 MILLIGRAM(S): at 11:17

## 2023-01-01 RX ADMIN — HYDROMORPHONE HYDROCHLORIDE 0.5 MILLIGRAM(S): 2 INJECTION INTRAMUSCULAR; INTRAVENOUS; SUBCUTANEOUS at 11:07

## 2023-01-01 RX ADMIN — CHLORHEXIDINE GLUCONATE 1 APPLICATION(S): 213 SOLUTION TOPICAL at 07:21

## 2023-01-01 RX ADMIN — Medication 4 MILLIGRAM(S): at 16:37

## 2023-01-01 RX ADMIN — SODIUM CHLORIDE 1000 MILLILITER(S): 9 INJECTION, SOLUTION INTRAVENOUS at 12:52

## 2023-01-01 RX ADMIN — PANTOPRAZOLE SODIUM 40 MILLIGRAM(S): 20 TABLET, DELAYED RELEASE ORAL at 09:54

## 2023-01-01 RX ADMIN — OCTREOTIDE ACETATE 100 MICROGRAM(S): 200 INJECTION, SOLUTION INTRAVENOUS; SUBCUTANEOUS at 18:49

## 2023-01-01 RX ADMIN — Medication 10 MILLIGRAM(S): at 15:17

## 2023-01-01 RX ADMIN — IMIPENEM AND CILASTATIN 250 MILLIGRAM(S): 250; 250 INJECTION, POWDER, FOR SOLUTION INTRAVENOUS at 18:46

## 2023-01-01 RX ADMIN — Medication 25 MILLIGRAM(S): at 10:25

## 2023-01-01 RX ADMIN — Medication 250 MILLIGRAM(S): at 10:50

## 2023-01-01 RX ADMIN — FLUCONAZOLE 100 MILLIGRAM(S): 150 TABLET ORAL at 00:16

## 2023-01-01 RX ADMIN — HYDROMORPHONE HYDROCHLORIDE 0.5 MILLIGRAM(S): 2 INJECTION INTRAMUSCULAR; INTRAVENOUS; SUBCUTANEOUS at 20:14

## 2023-01-01 RX ADMIN — I.V. FAT EMULSION 41.67 GM/KG/DAY: 20 EMULSION INTRAVENOUS at 18:21

## 2023-01-01 RX ADMIN — HYDROMORPHONE HYDROCHLORIDE 0.5 MILLIGRAM(S): 2 INJECTION INTRAMUSCULAR; INTRAVENOUS; SUBCUTANEOUS at 16:28

## 2023-01-01 RX ADMIN — Medication 2 TABLET(S): at 05:51

## 2023-01-01 RX ADMIN — Medication 5 MILLIGRAM(S): at 23:23

## 2023-01-01 RX ADMIN — MORPHINE 6 MILLIGRAM(S): 10 SOLUTION ORAL at 00:11

## 2023-01-01 RX ADMIN — CHOLESTYRAMINE 4 GRAM(S): 4 POWDER, FOR SUSPENSION ORAL at 21:55

## 2023-01-01 RX ADMIN — CHOLESTYRAMINE 4 GRAM(S): 4 POWDER, FOR SUSPENSION ORAL at 18:19

## 2023-01-01 RX ADMIN — CHLORHEXIDINE GLUCONATE 15 MILLILITER(S): 213 SOLUTION TOPICAL at 18:40

## 2023-01-01 RX ADMIN — Medication 2 TABLET(S): at 23:03

## 2023-01-01 RX ADMIN — SCOPALAMINE 1 PATCH: 1 PATCH, EXTENDED RELEASE TRANSDERMAL at 19:04

## 2023-01-01 RX ADMIN — CHLORHEXIDINE GLUCONATE 15 MILLILITER(S): 213 SOLUTION TOPICAL at 05:24

## 2023-01-01 RX ADMIN — URSODIOL 300 MILLIGRAM(S): 250 TABLET, FILM COATED ORAL at 13:01

## 2023-01-01 RX ADMIN — CEFTRIAXONE 100 MILLIGRAM(S): 500 INJECTION, POWDER, FOR SOLUTION INTRAMUSCULAR; INTRAVENOUS at 20:52

## 2023-01-01 RX ADMIN — MORPHINE 2 MILLIGRAM(S): 10 SOLUTION ORAL at 17:59

## 2023-01-01 RX ADMIN — HEPARIN SODIUM 5000 UNIT(S): 5000 INJECTION INTRAVENOUS; SUBCUTANEOUS at 14:24

## 2023-01-01 RX ADMIN — CHOLESTYRAMINE 4 GRAM(S): 4 POWDER, FOR SUSPENSION ORAL at 09:19

## 2023-01-01 RX ADMIN — Medication 5 MILLIGRAM(S): at 23:06

## 2023-01-01 RX ADMIN — Medication 1000 MILLIGRAM(S): at 10:00

## 2023-01-01 RX ADMIN — Medication 10 MILLIGRAM(S): at 18:46

## 2023-01-01 RX ADMIN — Medication 10 MILLIGRAM(S): at 12:30

## 2023-01-01 RX ADMIN — NYSTATIN CREAM 1 APPLICATION(S): 100000 CREAM TOPICAL at 22:14

## 2023-01-01 RX ADMIN — MORPHINE 6 MILLIGRAM(S): 10 SOLUTION ORAL at 11:05

## 2023-01-01 RX ADMIN — IMIPENEM AND CILASTATIN 250 MILLIGRAM(S): 250; 250 INJECTION, POWDER, FOR SOLUTION INTRAVENOUS at 21:58

## 2023-01-01 RX ADMIN — Medication 1 EACH: at 18:11

## 2023-01-01 RX ADMIN — NYSTATIN CREAM 1 APPLICATION(S): 100000 CREAM TOPICAL at 06:08

## 2023-01-01 RX ADMIN — Medication 150 MILLIGRAM(S): at 06:45

## 2023-01-01 RX ADMIN — Medication 50 MILLILITER(S): at 23:40

## 2023-01-01 RX ADMIN — Medication 5 MILLIGRAM(S): at 07:52

## 2023-01-01 RX ADMIN — Medication 2 TABLET(S): at 12:04

## 2023-01-01 RX ADMIN — URSODIOL 300 MILLIGRAM(S): 250 TABLET, FILM COATED ORAL at 05:51

## 2023-01-01 RX ADMIN — Medication 100 MILLIEQUIVALENT(S): at 15:23

## 2023-01-01 RX ADMIN — Medication 5 MILLIGRAM(S): at 18:02

## 2023-01-01 RX ADMIN — Medication 3 MILLILITER(S): at 17:10

## 2023-01-01 RX ADMIN — Medication 150 MILLIGRAM(S): at 06:08

## 2023-01-01 RX ADMIN — Medication 10 MILLIGRAM(S): at 05:46

## 2023-01-01 RX ADMIN — MORPHINE 6 MILLIGRAM(S): 10 SOLUTION ORAL at 05:55

## 2023-01-01 RX ADMIN — Medication 5 MILLIGRAM(S): at 22:10

## 2023-01-01 RX ADMIN — Medication 100 MILLIGRAM(S): at 13:43

## 2023-01-01 RX ADMIN — Medication 65 MILLILITER(S): at 18:12

## 2023-01-01 RX ADMIN — Medication 2 TABLET(S): at 17:52

## 2023-01-01 RX ADMIN — URSODIOL 300 MILLIGRAM(S): 250 TABLET, FILM COATED ORAL at 13:44

## 2023-01-01 RX ADMIN — Medication 25 MILLILITER(S): at 20:48

## 2023-01-01 RX ADMIN — OCTREOTIDE ACETATE 100 MICROGRAM(S): 200 INJECTION, SOLUTION INTRAVENOUS; SUBCUTANEOUS at 02:57

## 2023-01-01 RX ADMIN — Medication 25 MILLIGRAM(S): at 10:17

## 2023-01-01 RX ADMIN — FLUCONAZOLE 100 MILLIGRAM(S): 150 TABLET ORAL at 11:02

## 2023-01-01 RX ADMIN — Medication 5 MILLIGRAM(S): at 05:45

## 2023-01-01 RX ADMIN — Medication 4 MILLIGRAM(S): at 10:10

## 2023-01-01 RX ADMIN — Medication 2 TABLET(S): at 03:00

## 2023-01-01 RX ADMIN — Medication 100 MILLIGRAM(S): at 06:29

## 2023-01-01 RX ADMIN — CHLORHEXIDINE GLUCONATE 15 MILLILITER(S): 213 SOLUTION TOPICAL at 06:00

## 2023-01-01 RX ADMIN — AMLODIPINE BESYLATE 10 MILLIGRAM(S): 2.5 TABLET ORAL at 18:07

## 2023-01-01 RX ADMIN — IMIPENEM AND CILASTATIN 250 MILLIGRAM(S): 250; 250 INJECTION, POWDER, FOR SOLUTION INTRAVENOUS at 09:00

## 2023-01-01 RX ADMIN — MORPHINE 6 MILLIGRAM(S): 10 SOLUTION ORAL at 06:49

## 2023-01-01 RX ADMIN — Medication 10 MILLIGRAM(S): at 23:06

## 2023-01-01 RX ADMIN — CEFEPIME 100 MILLIGRAM(S): 1 INJECTION, POWDER, FOR SOLUTION INTRAMUSCULAR; INTRAVENOUS at 01:05

## 2023-01-01 RX ADMIN — ERGOCALCIFEROL 50000 UNIT(S): 1.25 CAPSULE ORAL at 11:17

## 2023-01-01 RX ADMIN — Medication 50 MILLIGRAM(S): at 17:48

## 2023-01-01 RX ADMIN — Medication 4 MILLIGRAM(S): at 11:13

## 2023-01-01 RX ADMIN — CHOLESTYRAMINE 4 GRAM(S): 4 POWDER, FOR SUSPENSION ORAL at 21:57

## 2023-01-01 RX ADMIN — ONDANSETRON 4 MILLIGRAM(S): 8 TABLET, FILM COATED ORAL at 10:22

## 2023-01-01 RX ADMIN — CEFEPIME 100 MILLIGRAM(S): 1 INJECTION, POWDER, FOR SOLUTION INTRAMUSCULAR; INTRAVENOUS at 01:09

## 2023-01-01 RX ADMIN — Medication 2 TABLET(S): at 23:42

## 2023-01-01 RX ADMIN — ATORVASTATIN CALCIUM 20 MILLIGRAM(S): 80 TABLET, FILM COATED ORAL at 23:24

## 2023-01-01 RX ADMIN — CHLORHEXIDINE GLUCONATE 1 APPLICATION(S): 213 SOLUTION TOPICAL at 07:23

## 2023-01-01 RX ADMIN — Medication 2 TABLET(S): at 05:14

## 2023-01-01 RX ADMIN — Medication 4 MILLIGRAM(S): at 06:39

## 2023-01-01 RX ADMIN — Medication 25 MILLIGRAM(S): at 22:48

## 2023-01-01 RX ADMIN — HEPARIN SODIUM 5000 UNIT(S): 5000 INJECTION INTRAVENOUS; SUBCUTANEOUS at 15:18

## 2023-01-01 RX ADMIN — Medication 50 MICROGRAM(S): at 05:54

## 2023-01-01 RX ADMIN — Medication 4 MILLIGRAM(S): at 12:09

## 2023-01-01 RX ADMIN — CHOLESTYRAMINE 4 GRAM(S): 4 POWDER, FOR SUSPENSION ORAL at 16:13

## 2023-01-01 RX ADMIN — I.V. FAT EMULSION 41.67 GM/KG/DAY: 20 EMULSION INTRAVENOUS at 17:15

## 2023-01-01 RX ADMIN — Medication 2 TABLET(S): at 00:04

## 2023-01-01 RX ADMIN — URSODIOL 300 MILLIGRAM(S): 250 TABLET, FILM COATED ORAL at 13:57

## 2023-01-01 RX ADMIN — SODIUM CHLORIDE 1000 MILLILITER(S): 9 INJECTION, SOLUTION INTRAVENOUS at 19:07

## 2023-01-01 RX ADMIN — MORPHINE 6 MILLIGRAM(S): 10 SOLUTION ORAL at 17:30

## 2023-01-01 RX ADMIN — PANTOPRAZOLE SODIUM 40 MILLIGRAM(S): 20 TABLET, DELAYED RELEASE ORAL at 10:19

## 2023-01-01 RX ADMIN — Medication 15 MILLIGRAM(S): at 07:45

## 2023-01-01 RX ADMIN — Medication 105 MILLILITER(S): at 14:31

## 2023-01-01 RX ADMIN — HYDROMORPHONE HYDROCHLORIDE 0.5 MILLIGRAM(S): 2 INJECTION INTRAMUSCULAR; INTRAVENOUS; SUBCUTANEOUS at 10:46

## 2023-01-01 RX ADMIN — OCTREOTIDE ACETATE 100 MICROGRAM(S): 200 INJECTION, SOLUTION INTRAVENOUS; SUBCUTANEOUS at 02:10

## 2023-01-01 RX ADMIN — LOSARTAN POTASSIUM 25 MILLIGRAM(S): 100 TABLET, FILM COATED ORAL at 06:34

## 2023-01-01 RX ADMIN — I.V. FAT EMULSION 20.8 GM/KG/DAY: 20 EMULSION INTRAVENOUS at 18:52

## 2023-01-01 RX ADMIN — Medication 1 SPRAY(S): at 19:09

## 2023-01-01 RX ADMIN — Medication 10 MILLIGRAM(S): at 06:55

## 2023-01-01 RX ADMIN — Medication 2 TABLET(S): at 18:11

## 2023-01-01 RX ADMIN — I.V. FAT EMULSION 42.6 GM/KG/DAY: 20 EMULSION INTRAVENOUS at 17:16

## 2023-01-01 RX ADMIN — MORPHINE 6 MILLIGRAM(S): 10 SOLUTION ORAL at 13:19

## 2023-01-01 RX ADMIN — NYSTATIN CREAM 1 APPLICATION(S): 100000 CREAM TOPICAL at 22:56

## 2023-01-01 RX ADMIN — OCTREOTIDE ACETATE 100 MICROGRAM(S): 200 INJECTION, SOLUTION INTRAVENOUS; SUBCUTANEOUS at 18:45

## 2023-01-01 RX ADMIN — Medication 5 MILLIGRAM(S): at 21:41

## 2023-01-01 RX ADMIN — Medication 75 MILLILITER(S): at 22:00

## 2023-01-01 RX ADMIN — SODIUM CHLORIDE 500 MILLILITER(S): 9 INJECTION INTRAMUSCULAR; INTRAVENOUS; SUBCUTANEOUS at 06:54

## 2023-01-01 RX ADMIN — Medication 10 MILLIGRAM(S): at 21:04

## 2023-01-01 RX ADMIN — Medication 25 MILLIGRAM(S): at 22:15

## 2023-01-01 RX ADMIN — Medication 5 MILLIGRAM(S): at 11:48

## 2023-01-01 RX ADMIN — Medication 50 MILLILITER(S): at 21:49

## 2023-01-01 RX ADMIN — Medication 250 MILLIGRAM(S): at 23:47

## 2023-01-01 RX ADMIN — MORPHINE 6 MILLIGRAM(S): 10 SOLUTION ORAL at 18:00

## 2023-01-01 RX ADMIN — Medication 1000 MILLIGRAM(S): at 06:05

## 2023-01-01 RX ADMIN — OCTREOTIDE ACETATE 100 MICROGRAM(S): 200 INJECTION, SOLUTION INTRAVENOUS; SUBCUTANEOUS at 13:08

## 2023-01-01 RX ADMIN — LIDOCAINE 1 PATCH: 4 CREAM TOPICAL at 23:30

## 2023-01-01 RX ADMIN — CISATRACURIUM BESYLATE 20.4 MICROGRAM(S)/KG/MIN: 2 INJECTION INTRAVENOUS at 00:35

## 2023-01-01 RX ADMIN — AMIODARONE HYDROCHLORIDE 16.7 MG/MIN: 400 TABLET ORAL at 23:18

## 2023-01-01 RX ADMIN — ATORVASTATIN CALCIUM 20 MILLIGRAM(S): 80 TABLET, FILM COATED ORAL at 22:19

## 2023-01-01 RX ADMIN — CHLORHEXIDINE GLUCONATE 15 MILLILITER(S): 213 SOLUTION TOPICAL at 07:18

## 2023-01-01 RX ADMIN — ATORVASTATIN CALCIUM 20 MILLIGRAM(S): 80 TABLET, FILM COATED ORAL at 21:53

## 2023-01-01 RX ADMIN — Medication 250 MILLIGRAM(S): at 22:29

## 2023-01-01 RX ADMIN — LIDOCAINE HYDROCHLORIDE AND EPINEPHRINE 20 MILLILITER(S): 10; 10 INJECTION, SOLUTION INFILTRATION; PERINEURAL at 16:37

## 2023-01-01 RX ADMIN — Medication 5 MILLIGRAM(S): at 11:01

## 2023-01-01 RX ADMIN — OCTREOTIDE ACETATE 100 MICROGRAM(S): 200 INJECTION, SOLUTION INTRAVENOUS; SUBCUTANEOUS at 05:36

## 2023-01-01 RX ADMIN — PANTOPRAZOLE SODIUM 40 MILLIGRAM(S): 20 TABLET, DELAYED RELEASE ORAL at 09:30

## 2023-01-01 RX ADMIN — OCTREOTIDE ACETATE 100 MICROGRAM(S): 200 INJECTION, SOLUTION INTRAVENOUS; SUBCUTANEOUS at 00:20

## 2023-01-01 RX ADMIN — Medication 1 EACH: at 17:29

## 2023-01-01 RX ADMIN — URSODIOL 300 MILLIGRAM(S): 250 TABLET, FILM COATED ORAL at 23:06

## 2023-01-01 RX ADMIN — URSODIOL 300 MILLIGRAM(S): 250 TABLET, FILM COATED ORAL at 21:45

## 2023-01-01 RX ADMIN — Medication 40 MILLIGRAM(S): at 12:59

## 2023-01-01 RX ADMIN — I.V. FAT EMULSION 20.83 GM/KG/DAY: 20 EMULSION INTRAVENOUS at 17:57

## 2023-01-01 RX ADMIN — MORPHINE 6 MILLIGRAM(S): 10 SOLUTION ORAL at 05:51

## 2023-01-01 RX ADMIN — Medication 1 EACH: at 18:19

## 2023-01-01 RX ADMIN — ATORVASTATIN CALCIUM 20 MILLIGRAM(S): 80 TABLET, FILM COATED ORAL at 21:27

## 2023-01-01 RX ADMIN — Medication 2 TABLET(S): at 19:16

## 2023-01-01 RX ADMIN — Medication 1 EACH: at 17:17

## 2023-01-01 RX ADMIN — Medication 4 MILLIGRAM(S): at 17:13

## 2023-01-01 RX ADMIN — CHOLESTYRAMINE 4 GRAM(S): 4 POWDER, FOR SUSPENSION ORAL at 09:20

## 2023-01-01 RX ADMIN — SODIUM CHLORIDE 100 MILLILITER(S): 9 INJECTION, SOLUTION INTRAVENOUS at 11:38

## 2023-01-01 RX ADMIN — Medication 2 TABLET(S): at 13:21

## 2023-01-01 RX ADMIN — Medication 50 MICROGRAM(S): at 05:42

## 2023-01-01 RX ADMIN — HEPARIN SODIUM 5000 UNIT(S): 5000 INJECTION INTRAVENOUS; SUBCUTANEOUS at 15:44

## 2023-01-01 RX ADMIN — Medication 4 MILLIGRAM(S): at 06:24

## 2023-01-01 RX ADMIN — Medication 400 MILLIGRAM(S): at 00:42

## 2023-01-01 RX ADMIN — Medication 5 MILLIGRAM(S): at 06:06

## 2023-01-01 RX ADMIN — IMIPENEM AND CILASTATIN 250 MILLIGRAM(S): 250; 250 INJECTION, POWDER, FOR SOLUTION INTRAVENOUS at 23:07

## 2023-01-01 RX ADMIN — HEPARIN SODIUM 5000 UNIT(S): 5000 INJECTION INTRAVENOUS; SUBCUTANEOUS at 22:48

## 2023-01-01 RX ADMIN — FENTANYL CITRATE 5.05 MICROGRAM(S)/KG/HR: 50 INJECTION INTRAVENOUS at 19:26

## 2023-01-01 RX ADMIN — OCTREOTIDE ACETATE 100 MICROGRAM(S): 200 INJECTION, SOLUTION INTRAVENOUS; SUBCUTANEOUS at 18:51

## 2023-01-01 RX ADMIN — Medication 400 MILLIGRAM(S): at 16:26

## 2023-01-01 RX ADMIN — Medication 4 MILLIGRAM(S): at 12:11

## 2023-01-01 RX ADMIN — HEPARIN SODIUM 5000 UNIT(S): 5000 INJECTION INTRAVENOUS; SUBCUTANEOUS at 15:54

## 2023-01-01 RX ADMIN — Medication 4 MILLIGRAM(S): at 05:15

## 2023-01-01 RX ADMIN — PANTOPRAZOLE SODIUM 40 MILLIGRAM(S): 20 TABLET, DELAYED RELEASE ORAL at 09:11

## 2023-01-01 RX ADMIN — Medication 5 MILLIGRAM(S): at 04:35

## 2023-01-01 RX ADMIN — Medication 300 MILLIGRAM(S): at 12:55

## 2023-01-01 RX ADMIN — I.V. FAT EMULSION 41.67 GM/KG/DAY: 20 EMULSION INTRAVENOUS at 17:56

## 2023-01-01 RX ADMIN — Medication 0.5 MILLIGRAM(S): at 03:20

## 2023-01-01 RX ADMIN — LOSARTAN POTASSIUM 25 MILLIGRAM(S): 100 TABLET, FILM COATED ORAL at 05:54

## 2023-01-01 RX ADMIN — URSODIOL 300 MILLIGRAM(S): 250 TABLET, FILM COATED ORAL at 05:57

## 2023-01-01 RX ADMIN — Medication 40 MILLIEQUIVALENT(S): at 09:36

## 2023-01-01 RX ADMIN — URSODIOL 300 MILLIGRAM(S): 250 TABLET, FILM COATED ORAL at 21:41

## 2023-01-01 RX ADMIN — Medication 1 EACH: at 17:52

## 2023-01-01 RX ADMIN — Medication 30 MICROGRAM(S): at 22:22

## 2023-01-01 RX ADMIN — Medication 2 TABLET(S): at 05:58

## 2023-01-01 RX ADMIN — Medication 2 TABLET(S): at 05:52

## 2023-01-01 RX ADMIN — Medication 3 MILLILITER(S): at 05:56

## 2023-01-01 RX ADMIN — Medication 4 MILLIGRAM(S): at 11:27

## 2023-01-01 RX ADMIN — Medication 5 MILLIGRAM(S): at 19:22

## 2023-01-01 RX ADMIN — IMIPENEM AND CILASTATIN 250 MILLIGRAM(S): 250; 250 INJECTION, POWDER, FOR SOLUTION INTRAVENOUS at 22:14

## 2023-01-01 RX ADMIN — HYDROMORPHONE HYDROCHLORIDE 0.5 MILLIGRAM(S): 2 INJECTION INTRAMUSCULAR; INTRAVENOUS; SUBCUTANEOUS at 12:51

## 2023-01-01 RX ADMIN — Medication 40 MILLIGRAM(S): at 21:22

## 2023-01-01 RX ADMIN — MORPHINE 6 MILLIGRAM(S): 10 SOLUTION ORAL at 06:40

## 2023-01-01 RX ADMIN — Medication 100 MILLIEQUIVALENT(S): at 11:57

## 2023-01-01 RX ADMIN — Medication 50 MICROGRAM(S): at 05:36

## 2023-01-01 RX ADMIN — NYSTATIN CREAM 1 APPLICATION(S): 100000 CREAM TOPICAL at 22:53

## 2023-01-01 RX ADMIN — Medication 15 MILLIGRAM(S): at 17:50

## 2023-01-01 RX ADMIN — ONDANSETRON 4 MILLIGRAM(S): 8 TABLET, FILM COATED ORAL at 11:06

## 2023-01-01 RX ADMIN — Medication 10 MILLIGRAM(S): at 18:06

## 2023-01-01 RX ADMIN — Medication 65 MILLILITER(S): at 08:00

## 2023-01-01 RX ADMIN — URSODIOL 300 MILLIGRAM(S): 250 TABLET, FILM COATED ORAL at 14:04

## 2023-01-01 RX ADMIN — Medication 0.5 MILLIGRAM(S): at 23:39

## 2023-01-01 RX ADMIN — Medication 5 MILLIGRAM(S): at 11:08

## 2023-01-01 RX ADMIN — MORPHINE 2 MILLIGRAM(S): 10 SOLUTION ORAL at 17:29

## 2023-01-01 RX ADMIN — NYSTATIN CREAM 1 APPLICATION(S): 100000 CREAM TOPICAL at 13:53

## 2023-01-01 RX ADMIN — I.V. FAT EMULSION 20.83 GM/KG/DAY: 20 EMULSION INTRAVENOUS at 17:46

## 2023-01-01 RX ADMIN — Medication 10 MILLIGRAM(S): at 09:17

## 2023-01-01 RX ADMIN — URSODIOL 300 MILLIGRAM(S): 250 TABLET, FILM COATED ORAL at 16:06

## 2023-01-01 RX ADMIN — HEPARIN SODIUM 5000 UNIT(S): 5000 INJECTION INTRAVENOUS; SUBCUTANEOUS at 13:08

## 2023-01-01 RX ADMIN — Medication 2 TABLET(S): at 13:48

## 2023-01-01 RX ADMIN — CHLORHEXIDINE GLUCONATE 1 APPLICATION(S): 213 SOLUTION TOPICAL at 06:20

## 2023-01-01 RX ADMIN — Medication 1 EACH: at 17:05

## 2023-01-01 RX ADMIN — URSODIOL 300 MILLIGRAM(S): 250 TABLET, FILM COATED ORAL at 16:53

## 2023-01-01 RX ADMIN — Medication 10 MILLIGRAM(S): at 23:53

## 2023-01-01 RX ADMIN — Medication 20 MILLIGRAM(S): at 10:44

## 2023-01-01 RX ADMIN — AMIODARONE HYDROCHLORIDE 200 MILLIGRAM(S): 400 TABLET ORAL at 05:10

## 2023-01-01 RX ADMIN — IMIPENEM AND CILASTATIN 250 MILLIGRAM(S): 250; 250 INJECTION, POWDER, FOR SOLUTION INTRAVENOUS at 02:26

## 2023-01-01 RX ADMIN — SODIUM CHLORIDE 1000 MILLILITER(S): 9 INJECTION INTRAMUSCULAR; INTRAVENOUS; SUBCUTANEOUS at 17:23

## 2023-01-01 RX ADMIN — Medication 2 TABLET(S): at 11:05

## 2023-01-01 RX ADMIN — Medication 300 MILLIGRAM(S): at 09:35

## 2023-01-01 RX ADMIN — PROPOFOL 6.06 MICROGRAM(S)/KG/MIN: 10 INJECTION, EMULSION INTRAVENOUS at 06:46

## 2023-01-01 RX ADMIN — I.V. FAT EMULSION 41.67 GM/KG/DAY: 20 EMULSION INTRAVENOUS at 17:02

## 2023-01-01 RX ADMIN — Medication 5 MILLIGRAM(S): at 11:14

## 2023-01-01 RX ADMIN — Medication 1 EACH: at 17:10

## 2023-01-01 RX ADMIN — Medication 10 MILLIGRAM(S): at 02:25

## 2023-01-01 RX ADMIN — Medication 65 MILLILITER(S): at 00:50

## 2023-01-01 RX ADMIN — MORPHINE 6 MILLIGRAM(S): 10 SOLUTION ORAL at 18:39

## 2023-01-01 RX ADMIN — URSODIOL 300 MILLIGRAM(S): 250 TABLET, FILM COATED ORAL at 06:08

## 2023-01-01 RX ADMIN — Medication 25 MILLIGRAM(S): at 21:30

## 2023-01-01 RX ADMIN — OCTREOTIDE ACETATE 100 MICROGRAM(S): 200 INJECTION, SOLUTION INTRAVENOUS; SUBCUTANEOUS at 22:51

## 2023-01-01 RX ADMIN — Medication 5 MILLIGRAM(S): at 12:04

## 2023-01-01 RX ADMIN — Medication 40 MICROGRAM(S): at 21:34

## 2023-01-01 RX ADMIN — Medication 100 MILLIGRAM(S): at 11:08

## 2023-01-01 RX ADMIN — Medication 40 MICROGRAM(S): at 06:40

## 2023-01-01 RX ADMIN — Medication 2 TABLET(S): at 00:11

## 2023-01-01 RX ADMIN — Medication 5 MILLIGRAM(S): at 00:05

## 2023-01-01 RX ADMIN — CHLORHEXIDINE GLUCONATE 15 MILLILITER(S): 213 SOLUTION TOPICAL at 06:15

## 2023-01-01 RX ADMIN — Medication 2 TABLET(S): at 05:34

## 2023-01-01 RX ADMIN — LOSARTAN POTASSIUM 25 MILLIGRAM(S): 100 TABLET, FILM COATED ORAL at 06:43

## 2023-01-01 RX ADMIN — Medication 150 MILLIGRAM(S): at 17:00

## 2023-01-01 RX ADMIN — CHOLESTYRAMINE 4 GRAM(S): 4 POWDER, FOR SUSPENSION ORAL at 16:31

## 2023-01-01 RX ADMIN — OCTREOTIDE ACETATE 100 MICROGRAM(S): 200 INJECTION, SOLUTION INTRAVENOUS; SUBCUTANEOUS at 11:20

## 2023-01-01 RX ADMIN — Medication 150 MILLIGRAM(S): at 06:29

## 2023-01-01 RX ADMIN — MORPHINE 6 MILLIGRAM(S): 10 SOLUTION ORAL at 13:25

## 2023-01-01 RX ADMIN — URSODIOL 300 MILLIGRAM(S): 250 TABLET, FILM COATED ORAL at 05:45

## 2023-01-01 RX ADMIN — URSODIOL 300 MILLIGRAM(S): 250 TABLET, FILM COATED ORAL at 13:35

## 2023-01-01 RX ADMIN — Medication 250 MILLIGRAM(S): at 04:02

## 2023-01-01 RX ADMIN — Medication 50 MILLILITER(S): at 22:44

## 2023-01-01 RX ADMIN — LOSARTAN POTASSIUM 25 MILLIGRAM(S): 100 TABLET, FILM COATED ORAL at 06:19

## 2023-01-01 RX ADMIN — SODIUM CHLORIDE 1000 MILLILITER(S): 9 INJECTION, SOLUTION INTRAVENOUS at 12:08

## 2023-01-01 RX ADMIN — Medication 2 TABLET(S): at 05:12

## 2023-01-01 RX ADMIN — Medication 4 MILLIGRAM(S): at 05:43

## 2023-01-01 RX ADMIN — HEPARIN SODIUM 5000 UNIT(S): 5000 INJECTION INTRAVENOUS; SUBCUTANEOUS at 21:54

## 2023-01-01 RX ADMIN — Medication 5 MILLIGRAM(S): at 18:42

## 2023-01-01 RX ADMIN — HEPARIN SODIUM 5000 UNIT(S): 5000 INJECTION INTRAVENOUS; SUBCUTANEOUS at 06:06

## 2023-01-01 RX ADMIN — Medication 10 MILLIGRAM(S): at 18:24

## 2023-01-01 RX ADMIN — I.V. FAT EMULSION 21.4 GM/KG/DAY: 20 EMULSION INTRAVENOUS at 17:04

## 2023-01-01 RX ADMIN — Medication 10 MILLIGRAM(S): at 10:04

## 2023-01-01 RX ADMIN — AMLODIPINE BESYLATE 10 MILLIGRAM(S): 2.5 TABLET ORAL at 05:06

## 2023-01-01 RX ADMIN — Medication 5 MILLIGRAM(S): at 03:33

## 2023-01-01 RX ADMIN — Medication 2 TABLET(S): at 06:40

## 2023-01-01 RX ADMIN — I.V. FAT EMULSION 42.1 GM/KG/DAY: 20 EMULSION INTRAVENOUS at 17:42

## 2023-01-01 RX ADMIN — Medication 2 TABLET(S): at 13:05

## 2023-01-01 RX ADMIN — Medication 5 MILLIGRAM(S): at 00:03

## 2023-01-01 RX ADMIN — CHLORHEXIDINE GLUCONATE 1 APPLICATION(S): 213 SOLUTION TOPICAL at 06:43

## 2023-01-01 RX ADMIN — MORPHINE 6 MILLIGRAM(S): 10 SOLUTION ORAL at 06:24

## 2023-01-01 RX ADMIN — Medication 1 MILLIGRAM(S): at 23:52

## 2023-01-01 RX ADMIN — CHLORHEXIDINE GLUCONATE 15 MILLILITER(S): 213 SOLUTION TOPICAL at 06:21

## 2023-01-01 RX ADMIN — Medication 65 MILLILITER(S): at 11:00

## 2023-01-01 RX ADMIN — Medication 2 TABLET(S): at 17:26

## 2023-01-01 RX ADMIN — ONDANSETRON 4 MILLIGRAM(S): 8 TABLET, FILM COATED ORAL at 08:47

## 2023-01-01 RX ADMIN — AMIODARONE HYDROCHLORIDE 200 MILLIGRAM(S): 400 TABLET ORAL at 06:54

## 2023-01-01 RX ADMIN — PANTOPRAZOLE SODIUM 40 MILLIGRAM(S): 20 TABLET, DELAYED RELEASE ORAL at 21:55

## 2023-01-01 RX ADMIN — ONDANSETRON 4 MILLIGRAM(S): 8 TABLET, FILM COATED ORAL at 15:09

## 2023-01-01 RX ADMIN — Medication 4 MILLIGRAM(S): at 01:08

## 2023-01-01 RX ADMIN — CHOLESTYRAMINE 4 GRAM(S): 4 POWDER, FOR SUSPENSION ORAL at 16:06

## 2023-01-01 RX ADMIN — Medication 5 MILLIGRAM(S): at 09:46

## 2023-01-01 RX ADMIN — Medication 4 MILLIGRAM(S): at 05:35

## 2023-01-01 RX ADMIN — Medication 5 MILLIGRAM(S): at 05:06

## 2023-01-01 RX ADMIN — Medication 250 MILLILITER(S): at 12:42

## 2023-01-01 RX ADMIN — OCTREOTIDE ACETATE 100 MICROGRAM(S): 200 INJECTION, SOLUTION INTRAVENOUS; SUBCUTANEOUS at 19:14

## 2023-01-01 RX ADMIN — ERGOCALCIFEROL 50000 UNIT(S): 1.25 CAPSULE ORAL at 09:37

## 2023-01-01 RX ADMIN — HEPARIN SODIUM 5000 UNIT(S): 5000 INJECTION INTRAVENOUS; SUBCUTANEOUS at 13:28

## 2023-01-01 RX ADMIN — CHLORHEXIDINE GLUCONATE 1 APPLICATION(S): 213 SOLUTION TOPICAL at 06:08

## 2023-01-01 RX ADMIN — Medication 5 MILLIGRAM(S): at 05:19

## 2023-01-01 RX ADMIN — Medication 150 MILLIGRAM(S): at 12:06

## 2023-01-01 RX ADMIN — Medication 2 TABLET(S): at 06:14

## 2023-01-01 RX ADMIN — CHLORHEXIDINE GLUCONATE 1 APPLICATION(S): 213 SOLUTION TOPICAL at 08:09

## 2023-01-01 RX ADMIN — HEPARIN SODIUM 5000 UNIT(S): 5000 INJECTION INTRAVENOUS; SUBCUTANEOUS at 05:14

## 2023-01-01 RX ADMIN — Medication 5 MILLILITER(S): at 14:00

## 2023-01-01 RX ADMIN — SODIUM CHLORIDE 1000 MILLILITER(S): 9 INJECTION, SOLUTION INTRAVENOUS at 15:19

## 2023-01-01 RX ADMIN — DIATRIZOATE MEGLUMINE 30 MILLILITER(S): 180 INJECTION, SOLUTION INTRAVESICAL at 19:38

## 2023-01-01 RX ADMIN — SODIUM CHLORIDE 120 MILLILITER(S): 9 INJECTION INTRAMUSCULAR; INTRAVENOUS; SUBCUTANEOUS at 02:29

## 2023-01-01 RX ADMIN — Medication 4 MILLIGRAM(S): at 11:56

## 2023-01-01 RX ADMIN — Medication 50 MICROGRAM(S): at 06:19

## 2023-01-01 RX ADMIN — HEPARIN SODIUM 5000 UNIT(S): 5000 INJECTION INTRAVENOUS; SUBCUTANEOUS at 14:46

## 2023-01-01 RX ADMIN — Medication 4 MILLIGRAM(S): at 17:26

## 2023-01-01 RX ADMIN — Medication 1 EACH: at 19:22

## 2023-01-01 RX ADMIN — HEPARIN SODIUM 5000 UNIT(S): 5000 INJECTION INTRAVENOUS; SUBCUTANEOUS at 05:46

## 2023-01-01 RX ADMIN — Medication 25 MILLIGRAM(S): at 18:05

## 2023-01-01 RX ADMIN — Medication 30 MICROGRAM(S): at 22:25

## 2023-01-01 RX ADMIN — CHOLESTYRAMINE 4 GRAM(S): 4 POWDER, FOR SUSPENSION ORAL at 10:02

## 2023-01-01 RX ADMIN — Medication 5 MILLIGRAM(S): at 00:21

## 2023-01-01 RX ADMIN — Medication 10 MILLIGRAM(S): at 05:14

## 2023-01-01 RX ADMIN — IMIPENEM AND CILASTATIN 250 MILLIGRAM(S): 250; 250 INJECTION, POWDER, FOR SOLUTION INTRAVENOUS at 21:23

## 2023-01-01 RX ADMIN — AMLODIPINE BESYLATE 5 MILLIGRAM(S): 2.5 TABLET ORAL at 06:33

## 2023-01-01 RX ADMIN — Medication 1 EACH: at 18:26

## 2023-01-01 RX ADMIN — CHOLESTYRAMINE 4 GRAM(S): 4 POWDER, FOR SUSPENSION ORAL at 21:07

## 2023-01-01 RX ADMIN — IMIPENEM AND CILASTATIN 100 MILLIGRAM(S): 250; 250 INJECTION, POWDER, FOR SOLUTION INTRAVENOUS at 17:32

## 2023-01-01 RX ADMIN — Medication 5 MILLIGRAM(S): at 05:37

## 2023-01-01 RX ADMIN — POTASSIUM PHOSPHATE, MONOBASIC POTASSIUM PHOSPHATE, DIBASIC 62.5 MILLIMOLE(S): 236; 224 INJECTION, SOLUTION INTRAVENOUS at 12:03

## 2023-01-01 RX ADMIN — Medication 10 MILLIGRAM(S): at 11:58

## 2023-01-01 RX ADMIN — Medication 5 MILLIGRAM(S): at 23:17

## 2023-01-01 RX ADMIN — AMLODIPINE BESYLATE 10 MILLIGRAM(S): 2.5 TABLET ORAL at 06:17

## 2023-01-01 RX ADMIN — AMLODIPINE BESYLATE 10 MILLIGRAM(S): 2.5 TABLET ORAL at 06:35

## 2023-01-01 RX ADMIN — MORPHINE 6 MILLIGRAM(S): 10 SOLUTION ORAL at 06:43

## 2023-01-01 RX ADMIN — LOSARTAN POTASSIUM 25 MILLIGRAM(S): 100 TABLET, FILM COATED ORAL at 06:24

## 2023-01-01 RX ADMIN — Medication 100 MILLIEQUIVALENT(S): at 07:39

## 2023-01-01 RX ADMIN — ONDANSETRON 4 MILLIGRAM(S): 8 TABLET, FILM COATED ORAL at 09:05

## 2023-01-01 RX ADMIN — AMIODARONE HYDROCHLORIDE 16.7 MG/MIN: 400 TABLET ORAL at 12:00

## 2023-01-01 RX ADMIN — IMIPENEM AND CILASTATIN 250 MILLIGRAM(S): 250; 250 INJECTION, POWDER, FOR SOLUTION INTRAVENOUS at 06:54

## 2023-01-01 RX ADMIN — URSODIOL 300 MILLIGRAM(S): 250 TABLET, FILM COATED ORAL at 21:06

## 2023-01-01 RX ADMIN — Medication 100 MILLIEQUIVALENT(S): at 08:47

## 2023-01-01 RX ADMIN — I.V. FAT EMULSION 20.83 GM/KG/DAY: 20 EMULSION INTRAVENOUS at 17:24

## 2023-01-01 RX ADMIN — SODIUM CHLORIDE 1000 MILLILITER(S): 9 INJECTION, SOLUTION INTRAVENOUS at 05:42

## 2023-01-01 RX ADMIN — AMIODARONE HYDROCHLORIDE 16.7 MG/MIN: 400 TABLET ORAL at 06:15

## 2023-01-01 RX ADMIN — Medication 40 MICROGRAM(S): at 07:05

## 2023-01-01 RX ADMIN — Medication 10 MILLIGRAM(S): at 22:27

## 2023-01-01 RX ADMIN — HEPARIN SODIUM 5000 UNIT(S): 5000 INJECTION INTRAVENOUS; SUBCUTANEOUS at 04:04

## 2023-01-01 RX ADMIN — HEPARIN SODIUM 5000 UNIT(S): 5000 INJECTION INTRAVENOUS; SUBCUTANEOUS at 15:08

## 2023-01-01 RX ADMIN — NYSTATIN CREAM 1 APPLICATION(S): 100000 CREAM TOPICAL at 06:42

## 2023-01-01 RX ADMIN — ENOXAPARIN SODIUM 100 MILLIGRAM(S): 100 INJECTION SUBCUTANEOUS at 18:40

## 2023-01-01 RX ADMIN — Medication 50 MICROGRAM(S): at 07:13

## 2023-01-01 RX ADMIN — ONDANSETRON 4 MILLIGRAM(S): 8 TABLET, FILM COATED ORAL at 11:50

## 2023-01-01 RX ADMIN — Medication 5 MILLIGRAM(S): at 11:37

## 2023-01-01 RX ADMIN — Medication 2 TABLET(S): at 12:30

## 2023-01-01 RX ADMIN — Medication 15 MILLIGRAM(S): at 00:10

## 2023-01-01 RX ADMIN — IMIPENEM AND CILASTATIN 250 MILLIGRAM(S): 250; 250 INJECTION, POWDER, FOR SOLUTION INTRAVENOUS at 10:58

## 2023-01-01 RX ADMIN — I.V. FAT EMULSION 20.8 GM/KG/DAY: 20 EMULSION INTRAVENOUS at 17:16

## 2023-01-01 RX ADMIN — Medication 25 MILLIGRAM(S): at 22:14

## 2023-01-01 RX ADMIN — CHOLESTYRAMINE 4 GRAM(S): 4 POWDER, FOR SUSPENSION ORAL at 16:59

## 2023-01-01 RX ADMIN — Medication 400 MILLIGRAM(S): at 22:19

## 2023-01-01 RX ADMIN — HEPARIN SODIUM 5000 UNIT(S): 5000 INJECTION INTRAVENOUS; SUBCUTANEOUS at 06:03

## 2023-01-01 RX ADMIN — HEPARIN SODIUM 5000 UNIT(S): 5000 INJECTION INTRAVENOUS; SUBCUTANEOUS at 22:28

## 2023-01-01 RX ADMIN — Medication 2 TABLET(S): at 18:47

## 2023-01-01 RX ADMIN — PANTOPRAZOLE SODIUM 40 MILLIGRAM(S): 20 TABLET, DELAYED RELEASE ORAL at 12:51

## 2023-01-01 RX ADMIN — URSODIOL 300 MILLIGRAM(S): 250 TABLET, FILM COATED ORAL at 06:17

## 2023-01-01 RX ADMIN — LOSARTAN POTASSIUM 25 MILLIGRAM(S): 100 TABLET, FILM COATED ORAL at 07:14

## 2023-01-01 RX ADMIN — POTASSIUM PHOSPHATE, MONOBASIC POTASSIUM PHOSPHATE, DIBASIC 62.5 MILLIMOLE(S): 236; 224 INJECTION, SOLUTION INTRAVENOUS at 10:14

## 2023-01-01 RX ADMIN — Medication 4 MILLIGRAM(S): at 05:19

## 2023-01-01 RX ADMIN — SODIUM CHLORIDE 500 MILLILITER(S): 9 INJECTION INTRAMUSCULAR; INTRAVENOUS; SUBCUTANEOUS at 08:17

## 2023-01-01 RX ADMIN — Medication 150 MILLIGRAM(S): at 06:52

## 2023-01-01 RX ADMIN — Medication 2: at 07:36

## 2023-01-01 RX ADMIN — HEPARIN SODIUM 5000 UNIT(S): 5000 INJECTION INTRAVENOUS; SUBCUTANEOUS at 15:05

## 2023-01-01 RX ADMIN — SODIUM CHLORIDE 4 MILLILITER(S): 9 INJECTION INTRAMUSCULAR; INTRAVENOUS; SUBCUTANEOUS at 13:05

## 2023-01-01 RX ADMIN — OCTREOTIDE ACETATE 100 MICROGRAM(S): 200 INJECTION, SOLUTION INTRAVENOUS; SUBCUTANEOUS at 14:13

## 2023-01-01 RX ADMIN — MORPHINE 2 MILLIGRAM(S): 10 SOLUTION ORAL at 17:51

## 2023-01-01 RX ADMIN — ENOXAPARIN SODIUM 100 MILLIGRAM(S): 100 INJECTION SUBCUTANEOUS at 05:24

## 2023-01-01 RX ADMIN — Medication 0.25 MILLIGRAM(S): at 03:36

## 2023-01-01 RX ADMIN — Medication 2 TABLET(S): at 22:09

## 2023-01-01 RX ADMIN — Medication 300 MILLIGRAM(S): at 09:50

## 2023-01-01 RX ADMIN — Medication 30 MICROGRAM(S): at 22:10

## 2023-01-01 RX ADMIN — Medication 300 MILLIGRAM(S): at 10:05

## 2023-01-01 RX ADMIN — Medication 10 MILLIGRAM(S): at 14:03

## 2023-01-01 RX ADMIN — AMIODARONE HYDROCHLORIDE 200 MILLIGRAM(S): 400 TABLET ORAL at 07:02

## 2023-01-01 RX ADMIN — PANTOPRAZOLE SODIUM 40 MILLIGRAM(S): 20 TABLET, DELAYED RELEASE ORAL at 10:51

## 2023-01-01 RX ADMIN — HEPARIN SODIUM 5000 UNIT(S): 5000 INJECTION INTRAVENOUS; SUBCUTANEOUS at 15:03

## 2023-01-01 RX ADMIN — Medication 1 SPRAY(S): at 21:22

## 2023-01-01 RX ADMIN — I.V. FAT EMULSION 41.67 GM/KG/DAY: 20 EMULSION INTRAVENOUS at 18:23

## 2023-01-01 RX ADMIN — I.V. FAT EMULSION 20.8 GM/KG/DAY: 20 EMULSION INTRAVENOUS at 17:20

## 2023-01-01 RX ADMIN — Medication 5 MILLIGRAM(S): at 23:00

## 2023-01-01 RX ADMIN — CHOLESTYRAMINE 4 GRAM(S): 4 POWDER, FOR SUSPENSION ORAL at 09:49

## 2023-01-01 RX ADMIN — Medication 50 MILLIEQUIVALENT(S): at 10:04

## 2023-01-01 RX ADMIN — Medication 10 MILLIGRAM(S): at 05:17

## 2023-01-01 RX ADMIN — Medication 150 MILLIGRAM(S): at 11:44

## 2023-01-01 RX ADMIN — Medication 75 MILLILITER(S): at 20:00

## 2023-01-01 RX ADMIN — Medication 400 MILLIGRAM(S): at 13:00

## 2023-01-01 RX ADMIN — CHOLESTYRAMINE 4 GRAM(S): 4 POWDER, FOR SUSPENSION ORAL at 19:34

## 2023-01-01 RX ADMIN — CHLORHEXIDINE GLUCONATE 1 APPLICATION(S): 213 SOLUTION TOPICAL at 05:02

## 2023-01-01 RX ADMIN — CHLORHEXIDINE GLUCONATE 1 APPLICATION(S): 213 SOLUTION TOPICAL at 06:21

## 2023-01-01 RX ADMIN — Medication 10 MILLIGRAM(S): at 05:53

## 2023-01-01 RX ADMIN — HYDROMORPHONE HYDROCHLORIDE 0.5 MILLIGRAM(S): 2 INJECTION INTRAMUSCULAR; INTRAVENOUS; SUBCUTANEOUS at 12:05

## 2023-01-01 RX ADMIN — Medication 100 GRAM(S): at 07:47

## 2023-01-01 RX ADMIN — ATORVASTATIN CALCIUM 20 MILLIGRAM(S): 80 TABLET, FILM COATED ORAL at 21:09

## 2023-01-01 RX ADMIN — Medication 65 MILLILITER(S): at 18:00

## 2023-01-01 RX ADMIN — Medication 2 TABLET(S): at 05:24

## 2023-01-01 RX ADMIN — ONDANSETRON 4 MILLIGRAM(S): 8 TABLET, FILM COATED ORAL at 13:00

## 2023-01-01 RX ADMIN — PANTOPRAZOLE SODIUM 40 MILLIGRAM(S): 20 TABLET, DELAYED RELEASE ORAL at 12:40

## 2023-01-01 RX ADMIN — HYDROMORPHONE HYDROCHLORIDE 0.5 MILLIGRAM(S): 2 INJECTION INTRAMUSCULAR; INTRAVENOUS; SUBCUTANEOUS at 14:58

## 2023-01-01 RX ADMIN — HEPARIN SODIUM 5000 UNIT(S): 5000 INJECTION INTRAVENOUS; SUBCUTANEOUS at 21:09

## 2023-01-01 RX ADMIN — OCTREOTIDE ACETATE 100 MICROGRAM(S): 200 INJECTION, SOLUTION INTRAVENOUS; SUBCUTANEOUS at 21:48

## 2023-01-01 RX ADMIN — Medication 2 TABLET(S): at 06:24

## 2023-01-01 RX ADMIN — URSODIOL 300 MILLIGRAM(S): 250 TABLET, FILM COATED ORAL at 15:19

## 2023-01-01 RX ADMIN — OCTREOTIDE ACETATE 100 MICROGRAM(S): 200 INJECTION, SOLUTION INTRAVENOUS; SUBCUTANEOUS at 11:36

## 2023-01-01 RX ADMIN — Medication 10 MILLIGRAM(S): at 02:53

## 2023-01-01 RX ADMIN — CHLORHEXIDINE GLUCONATE 1 APPLICATION(S): 213 SOLUTION TOPICAL at 07:19

## 2023-01-01 RX ADMIN — Medication 2 TABLET(S): at 12:17

## 2023-01-01 RX ADMIN — MORPHINE 6 MILLIGRAM(S): 10 SOLUTION ORAL at 12:23

## 2023-01-01 RX ADMIN — CHOLESTYRAMINE 4 GRAM(S): 4 POWDER, FOR SUSPENSION ORAL at 16:23

## 2023-01-01 RX ADMIN — AMLODIPINE BESYLATE 5 MILLIGRAM(S): 2.5 TABLET ORAL at 05:35

## 2023-01-01 RX ADMIN — MORPHINE 2 MILLIGRAM(S): 10 SOLUTION ORAL at 05:14

## 2023-01-01 RX ADMIN — Medication 5 MILLIGRAM(S): at 00:23

## 2023-01-01 RX ADMIN — NYSTATIN CREAM 1 APPLICATION(S): 100000 CREAM TOPICAL at 15:54

## 2023-01-01 RX ADMIN — CHOLESTYRAMINE 4 GRAM(S): 4 POWDER, FOR SUSPENSION ORAL at 17:51

## 2023-01-01 RX ADMIN — CHOLESTYRAMINE 4 GRAM(S): 4 POWDER, FOR SUSPENSION ORAL at 15:27

## 2023-01-01 RX ADMIN — ONDANSETRON 4 MILLIGRAM(S): 8 TABLET, FILM COATED ORAL at 06:38

## 2023-01-01 RX ADMIN — DIPHENHYDRAMINE HYDROCHLORIDE AND LIDOCAINE HYDROCHLORIDE AND ALUMINUM HYDROXIDE AND MAGNESIUM HYDRO 10 MILLILITER(S): KIT at 14:10

## 2023-01-01 RX ADMIN — CHOLESTYRAMINE 4 GRAM(S): 4 POWDER, FOR SUSPENSION ORAL at 09:03

## 2023-01-01 RX ADMIN — HEPARIN SODIUM 5000 UNIT(S): 5000 INJECTION INTRAVENOUS; SUBCUTANEOUS at 14:02

## 2023-01-01 RX ADMIN — URSODIOL 300 MILLIGRAM(S): 250 TABLET, FILM COATED ORAL at 06:48

## 2023-01-01 RX ADMIN — OCTREOTIDE ACETATE 100 MICROGRAM(S): 200 INJECTION, SOLUTION INTRAVENOUS; SUBCUTANEOUS at 10:03

## 2023-01-01 RX ADMIN — SODIUM CHLORIDE 130 MILLILITER(S): 9 INJECTION, SOLUTION INTRAVENOUS at 23:04

## 2023-01-01 RX ADMIN — HEPARIN SODIUM 5000 UNIT(S): 5000 INJECTION INTRAVENOUS; SUBCUTANEOUS at 05:12

## 2023-01-01 RX ADMIN — Medication 5 MILLIGRAM(S): at 19:45

## 2023-01-01 RX ADMIN — PANTOPRAZOLE SODIUM 40 MILLIGRAM(S): 20 TABLET, DELAYED RELEASE ORAL at 11:41

## 2023-01-01 RX ADMIN — MORPHINE 6 MILLIGRAM(S): 10 SOLUTION ORAL at 23:02

## 2023-01-01 RX ADMIN — OCTREOTIDE ACETATE 100 MICROGRAM(S): 200 INJECTION, SOLUTION INTRAVENOUS; SUBCUTANEOUS at 17:19

## 2023-01-01 RX ADMIN — OCTREOTIDE ACETATE 100 MICROGRAM(S): 200 INJECTION, SOLUTION INTRAVENOUS; SUBCUTANEOUS at 00:22

## 2023-01-01 RX ADMIN — Medication 20 MILLIGRAM(S): at 13:16

## 2023-01-01 RX ADMIN — CASPOFUNGIN ACETATE 260 MILLIGRAM(S): 7 INJECTION, POWDER, LYOPHILIZED, FOR SOLUTION INTRAVENOUS at 11:40

## 2023-01-01 RX ADMIN — LOSARTAN POTASSIUM 25 MILLIGRAM(S): 100 TABLET, FILM COATED ORAL at 06:39

## 2023-01-01 RX ADMIN — Medication 100 MILLIEQUIVALENT(S): at 13:15

## 2023-01-01 RX ADMIN — Medication 50 MILLILITER(S): at 11:00

## 2023-01-01 RX ADMIN — Medication 5 MILLIGRAM(S): at 23:13

## 2023-01-01 RX ADMIN — AMLODIPINE BESYLATE 5 MILLIGRAM(S): 2.5 TABLET ORAL at 11:09

## 2023-01-01 RX ADMIN — Medication 1 EACH: at 17:12

## 2023-01-01 RX ADMIN — Medication 2 TABLET(S): at 05:38

## 2023-01-01 RX ADMIN — SODIUM CHLORIDE 100 MILLILITER(S): 9 INJECTION INTRAMUSCULAR; INTRAVENOUS; SUBCUTANEOUS at 17:04

## 2023-01-01 RX ADMIN — Medication 2 TABLET(S): at 11:22

## 2023-01-01 RX ADMIN — CHLORHEXIDINE GLUCONATE 1 APPLICATION(S): 213 SOLUTION TOPICAL at 06:51

## 2023-01-01 RX ADMIN — Medication 50 MILLILITER(S): at 07:39

## 2023-01-01 RX ADMIN — Medication 5 MILLIGRAM(S): at 00:13

## 2023-01-01 RX ADMIN — AMLODIPINE BESYLATE 5 MILLIGRAM(S): 2.5 TABLET ORAL at 05:53

## 2023-01-01 RX ADMIN — OCTREOTIDE ACETATE 100 MICROGRAM(S): 200 INJECTION, SOLUTION INTRAVENOUS; SUBCUTANEOUS at 23:50

## 2023-01-01 RX ADMIN — MORPHINE 6 MILLIGRAM(S): 10 SOLUTION ORAL at 17:14

## 2023-01-01 RX ADMIN — OCTREOTIDE ACETATE 100 MICROGRAM(S): 200 INJECTION, SOLUTION INTRAVENOUS; SUBCUTANEOUS at 01:05

## 2023-01-01 RX ADMIN — Medication 5 MILLIGRAM(S): at 15:50

## 2023-01-01 RX ADMIN — BENZOCAINE AND MENTHOL 2 LOZENGE: 5; 1 LIQUID ORAL at 00:09

## 2023-01-01 RX ADMIN — CHOLESTYRAMINE 4 GRAM(S): 4 POWDER, FOR SUSPENSION ORAL at 16:08

## 2023-01-01 RX ADMIN — SODIUM CHLORIDE 1000 MILLILITER(S): 9 INJECTION INTRAMUSCULAR; INTRAVENOUS; SUBCUTANEOUS at 17:03

## 2023-01-01 RX ADMIN — HEPARIN SODIUM 5000 UNIT(S): 5000 INJECTION INTRAVENOUS; SUBCUTANEOUS at 16:02

## 2023-01-01 RX ADMIN — FLUCONAZOLE 100 MILLIGRAM(S): 150 TABLET ORAL at 11:36

## 2023-01-01 RX ADMIN — Medication 125 MILLILITER(S): at 17:50

## 2023-01-01 RX ADMIN — SODIUM CHLORIDE 1000 MILLILITER(S): 9 INJECTION, SOLUTION INTRAVENOUS at 10:45

## 2023-01-01 RX ADMIN — Medication 5 MILLIGRAM(S): at 00:16

## 2023-01-01 RX ADMIN — IMIPENEM AND CILASTATIN 250 MILLIGRAM(S): 250; 250 INJECTION, POWDER, FOR SOLUTION INTRAVENOUS at 18:33

## 2023-01-01 RX ADMIN — HYDROMORPHONE HYDROCHLORIDE 0.5 MILLIGRAM(S): 2 INJECTION INTRAMUSCULAR; INTRAVENOUS; SUBCUTANEOUS at 11:15

## 2023-01-01 RX ADMIN — Medication 5 MILLIGRAM(S): at 11:46

## 2023-01-01 RX ADMIN — Medication 15 MILLIGRAM(S): at 02:32

## 2023-01-01 RX ADMIN — AMIODARONE HYDROCHLORIDE 200 MILLIGRAM(S): 400 TABLET ORAL at 06:53

## 2023-01-01 RX ADMIN — CHLORHEXIDINE GLUCONATE 1 APPLICATION(S): 213 SOLUTION TOPICAL at 06:36

## 2023-01-01 RX ADMIN — MORPHINE 6 MILLIGRAM(S): 10 SOLUTION ORAL at 12:30

## 2023-01-01 RX ADMIN — Medication 50 MILLILITER(S): at 21:02

## 2023-01-01 RX ADMIN — FLUCONAZOLE 100 MILLIGRAM(S): 150 TABLET ORAL at 12:33

## 2023-01-01 RX ADMIN — Medication 10 MILLILITER(S): at 10:30

## 2023-01-01 RX ADMIN — Medication 1 EACH: at 21:01

## 2023-01-01 RX ADMIN — Medication 150 MILLIGRAM(S): at 06:30

## 2023-01-01 RX ADMIN — OCTREOTIDE ACETATE 100 MICROGRAM(S): 200 INJECTION, SOLUTION INTRAVENOUS; SUBCUTANEOUS at 22:16

## 2023-01-01 RX ADMIN — MORPHINE 2 MILLIGRAM(S): 10 SOLUTION ORAL at 05:47

## 2023-01-01 RX ADMIN — MORPHINE 2 MILLIGRAM(S): 10 SOLUTION ORAL at 18:47

## 2023-01-01 RX ADMIN — Medication 4 MILLIGRAM(S): at 05:29

## 2023-01-01 RX ADMIN — Medication 1 EACH: at 19:46

## 2023-01-01 RX ADMIN — Medication 2 TABLET(S): at 16:12

## 2023-01-01 RX ADMIN — MORPHINE 6 MILLIGRAM(S): 10 SOLUTION ORAL at 11:14

## 2023-01-01 RX ADMIN — CHOLESTYRAMINE 4 GRAM(S): 4 POWDER, FOR SUSPENSION ORAL at 17:05

## 2023-01-01 RX ADMIN — LOSARTAN POTASSIUM 25 MILLIGRAM(S): 100 TABLET, FILM COATED ORAL at 09:39

## 2023-01-01 RX ADMIN — NYSTATIN CREAM 1 APPLICATION(S): 100000 CREAM TOPICAL at 21:14

## 2023-01-01 RX ADMIN — Medication 25 MILLIGRAM(S): at 17:57

## 2023-01-01 RX ADMIN — Medication 2 TABLET(S): at 12:57

## 2023-01-01 RX ADMIN — Medication 4 MILLIGRAM(S): at 06:25

## 2023-01-01 RX ADMIN — HEPARIN SODIUM 5000 UNIT(S): 5000 INJECTION INTRAVENOUS; SUBCUTANEOUS at 13:07

## 2023-01-01 RX ADMIN — Medication 5 MILLIGRAM(S): at 06:50

## 2023-01-01 RX ADMIN — Medication 4 MILLIGRAM(S): at 17:16

## 2023-01-01 RX ADMIN — ERYTHROPOIETIN 10000 UNIT(S): 10000 INJECTION, SOLUTION INTRAVENOUS; SUBCUTANEOUS at 14:43

## 2023-01-01 RX ADMIN — HEPARIN SODIUM 5000 UNIT(S): 5000 INJECTION INTRAVENOUS; SUBCUTANEOUS at 07:08

## 2023-01-01 RX ADMIN — HYDROMORPHONE HYDROCHLORIDE 0.5 MILLIGRAM(S): 2 INJECTION INTRAMUSCULAR; INTRAVENOUS; SUBCUTANEOUS at 09:55

## 2023-01-01 RX ADMIN — Medication 300 MILLIGRAM(S): at 09:39

## 2023-01-01 RX ADMIN — HYDROMORPHONE HYDROCHLORIDE 0.5 MILLIGRAM(S): 2 INJECTION INTRAMUSCULAR; INTRAVENOUS; SUBCUTANEOUS at 21:47

## 2023-01-01 RX ADMIN — ATORVASTATIN CALCIUM 20 MILLIGRAM(S): 80 TABLET, FILM COATED ORAL at 22:07

## 2023-01-01 RX ADMIN — ERYTHROPOIETIN 10000 UNIT(S): 10000 INJECTION, SOLUTION INTRAVENOUS; SUBCUTANEOUS at 17:08

## 2023-01-01 RX ADMIN — PANTOPRAZOLE SODIUM 40 MILLIGRAM(S): 20 TABLET, DELAYED RELEASE ORAL at 09:19

## 2023-01-01 RX ADMIN — Medication 15 MILLIGRAM(S): at 05:45

## 2023-01-01 RX ADMIN — Medication 150 MILLIGRAM(S): at 12:21

## 2023-01-01 RX ADMIN — HEPARIN SODIUM 5000 UNIT(S): 5000 INJECTION INTRAVENOUS; SUBCUTANEOUS at 13:11

## 2023-01-01 RX ADMIN — Medication 100 MILLIGRAM(S): at 05:49

## 2023-01-01 RX ADMIN — Medication 10 MILLIGRAM(S): at 07:36

## 2023-01-01 RX ADMIN — NYSTATIN CREAM 1 APPLICATION(S): 100000 CREAM TOPICAL at 05:11

## 2023-01-01 RX ADMIN — SODIUM CHLORIDE 100 MILLILITER(S): 9 INJECTION INTRAMUSCULAR; INTRAVENOUS; SUBCUTANEOUS at 07:24

## 2023-01-01 RX ADMIN — OCTREOTIDE ACETATE 100 MICROGRAM(S): 200 INJECTION, SOLUTION INTRAVENOUS; SUBCUTANEOUS at 17:32

## 2023-01-01 RX ADMIN — CASPOFUNGIN ACETATE 260 MILLIGRAM(S): 7 INJECTION, POWDER, LYOPHILIZED, FOR SOLUTION INTRAVENOUS at 12:18

## 2023-01-01 RX ADMIN — Medication 2 TABLET(S): at 03:05

## 2023-01-01 RX ADMIN — Medication 4 MILLIGRAM(S): at 11:50

## 2023-01-01 RX ADMIN — Medication 5 MILLIGRAM(S): at 05:59

## 2023-01-01 RX ADMIN — Medication 40 MILLIGRAM(S): at 21:41

## 2023-01-01 RX ADMIN — SODIUM CHLORIDE 135 MILLILITER(S): 9 INJECTION, SOLUTION INTRAVENOUS at 12:13

## 2023-01-01 RX ADMIN — Medication 1 MILLIGRAM(S): at 23:20

## 2023-01-01 RX ADMIN — Medication 0.5 MILLIGRAM(S): at 04:52

## 2023-01-01 RX ADMIN — URSODIOL 300 MILLIGRAM(S): 250 TABLET, FILM COATED ORAL at 15:43

## 2023-01-01 RX ADMIN — AMLODIPINE BESYLATE 5 MILLIGRAM(S): 2.5 TABLET ORAL at 10:55

## 2023-01-01 RX ADMIN — HEPARIN SODIUM 5000 UNIT(S): 5000 INJECTION INTRAVENOUS; SUBCUTANEOUS at 05:37

## 2023-01-01 RX ADMIN — Medication 100 GRAM(S): at 09:42

## 2023-01-01 RX ADMIN — MORPHINE 6 MILLIGRAM(S): 10 SOLUTION ORAL at 05:43

## 2023-01-01 RX ADMIN — Medication 25 MILLIGRAM(S): at 10:02

## 2023-01-01 RX ADMIN — Medication 150 MILLIGRAM(S): at 12:31

## 2023-01-01 RX ADMIN — SODIUM CHLORIDE 500 MILLILITER(S): 9 INJECTION INTRAMUSCULAR; INTRAVENOUS; SUBCUTANEOUS at 17:08

## 2023-01-01 RX ADMIN — SODIUM CHLORIDE 120 MILLILITER(S): 9 INJECTION, SOLUTION INTRAVENOUS at 04:47

## 2023-01-01 RX ADMIN — CHOLESTYRAMINE 4 GRAM(S): 4 POWDER, FOR SUSPENSION ORAL at 21:18

## 2023-01-01 RX ADMIN — URSODIOL 300 MILLIGRAM(S): 250 TABLET, FILM COATED ORAL at 23:48

## 2023-01-01 RX ADMIN — Medication 2 TABLET(S): at 06:29

## 2023-01-01 RX ADMIN — Medication 0.5 MILLIGRAM(S): at 00:33

## 2023-01-01 RX ADMIN — Medication 5 MILLIGRAM(S): at 18:22

## 2023-01-01 RX ADMIN — Medication 4 MILLIGRAM(S): at 17:05

## 2023-01-01 RX ADMIN — Medication 100 MILLIGRAM(S): at 05:43

## 2023-01-01 RX ADMIN — AMLODIPINE BESYLATE 10 MILLIGRAM(S): 2.5 TABLET ORAL at 05:30

## 2023-01-01 RX ADMIN — SODIUM CHLORIDE 1000 MILLILITER(S): 9 INJECTION, SOLUTION INTRAVENOUS at 10:28

## 2023-01-01 RX ADMIN — Medication 400 MILLIGRAM(S): at 10:50

## 2023-01-01 RX ADMIN — Medication 150 MILLIGRAM(S): at 05:55

## 2023-01-01 RX ADMIN — CHLORHEXIDINE GLUCONATE 1 APPLICATION(S): 213 SOLUTION TOPICAL at 07:28

## 2023-01-01 RX ADMIN — NYSTATIN CREAM 1 APPLICATION(S): 100000 CREAM TOPICAL at 21:29

## 2023-01-01 RX ADMIN — Medication 2 TABLET(S): at 12:52

## 2023-01-01 RX ADMIN — Medication 2 TABLET(S): at 17:28

## 2023-01-01 RX ADMIN — GABAPENTIN 100 MILLIGRAM(S): 400 CAPSULE ORAL at 21:32

## 2023-01-01 RX ADMIN — Medication 10 MILLIGRAM(S): at 11:49

## 2023-01-01 RX ADMIN — NYSTATIN CREAM 1 APPLICATION(S): 100000 CREAM TOPICAL at 23:02

## 2023-01-01 RX ADMIN — Medication 4 MILLIGRAM(S): at 05:24

## 2023-01-01 RX ADMIN — CHOLESTYRAMINE 4 GRAM(S): 4 POWDER, FOR SUSPENSION ORAL at 16:32

## 2023-01-01 RX ADMIN — Medication 75 MILLILITER(S): at 01:23

## 2023-01-01 RX ADMIN — HEPARIN SODIUM 5000 UNIT(S): 5000 INJECTION INTRAVENOUS; SUBCUTANEOUS at 14:05

## 2023-01-01 RX ADMIN — Medication 250 MILLIGRAM(S): at 06:41

## 2023-01-01 RX ADMIN — Medication 250 MILLIGRAM(S): at 01:08

## 2023-01-01 RX ADMIN — HYDROMORPHONE HYDROCHLORIDE 0.5 MILLIGRAM(S): 2 INJECTION INTRAMUSCULAR; INTRAVENOUS; SUBCUTANEOUS at 13:01

## 2023-01-01 RX ADMIN — Medication 5 MILLIGRAM(S): at 12:55

## 2023-01-01 RX ADMIN — Medication 2 TABLET(S): at 11:16

## 2023-01-01 RX ADMIN — Medication 5 MILLIGRAM(S): at 23:05

## 2023-01-01 RX ADMIN — Medication 10 MILLIGRAM(S): at 22:56

## 2023-01-01 RX ADMIN — CHOLESTYRAMINE 4 GRAM(S): 4 POWDER, FOR SUSPENSION ORAL at 09:44

## 2023-01-01 RX ADMIN — Medication 2 MILLIGRAM(S): at 07:49

## 2023-01-01 RX ADMIN — MORPHINE 6 MILLIGRAM(S): 10 SOLUTION ORAL at 23:06

## 2023-01-01 RX ADMIN — HYDROMORPHONE HYDROCHLORIDE 0.5 MILLIGRAM(S): 2 INJECTION INTRAMUSCULAR; INTRAVENOUS; SUBCUTANEOUS at 16:50

## 2023-01-01 RX ADMIN — IMIPENEM AND CILASTATIN 250 MILLIGRAM(S): 250; 250 INJECTION, POWDER, FOR SOLUTION INTRAVENOUS at 15:51

## 2023-01-01 RX ADMIN — Medication 10 MILLIGRAM(S): at 19:08

## 2023-01-01 RX ADMIN — MORPHINE 2 MILLIGRAM(S): 10 SOLUTION ORAL at 12:04

## 2023-01-01 RX ADMIN — Medication 75 MILLILITER(S): at 00:00

## 2023-01-01 RX ADMIN — HEPARIN SODIUM 5000 UNIT(S): 5000 INJECTION INTRAVENOUS; SUBCUTANEOUS at 06:14

## 2023-01-01 RX ADMIN — OCTREOTIDE ACETATE 100 MICROGRAM(S): 200 INJECTION, SOLUTION INTRAVENOUS; SUBCUTANEOUS at 15:25

## 2023-01-01 RX ADMIN — NYSTATIN CREAM 1 APPLICATION(S): 100000 CREAM TOPICAL at 15:20

## 2023-01-01 RX ADMIN — HEPARIN SODIUM 5000 UNIT(S): 5000 INJECTION INTRAVENOUS; SUBCUTANEOUS at 12:51

## 2023-01-01 RX ADMIN — Medication 50 MILLILITER(S): at 14:53

## 2023-01-01 RX ADMIN — OCTREOTIDE ACETATE 100 MICROGRAM(S): 200 INJECTION, SOLUTION INTRAVENOUS; SUBCUTANEOUS at 09:28

## 2023-01-01 RX ADMIN — Medication 5 MILLIGRAM(S): at 17:30

## 2023-01-01 RX ADMIN — NYSTATIN CREAM 1 APPLICATION(S): 100000 CREAM TOPICAL at 06:16

## 2023-01-01 RX ADMIN — Medication 5 MILLIGRAM(S): at 22:52

## 2023-01-01 RX ADMIN — URSODIOL 300 MILLIGRAM(S): 250 TABLET, FILM COATED ORAL at 22:17

## 2023-01-01 RX ADMIN — Medication 5 MILLIGRAM(S): at 23:31

## 2023-01-01 RX ADMIN — SODIUM CHLORIDE 100 MILLILITER(S): 9 INJECTION INTRAMUSCULAR; INTRAVENOUS; SUBCUTANEOUS at 19:54

## 2023-01-01 RX ADMIN — URSODIOL 300 MILLIGRAM(S): 250 TABLET, FILM COATED ORAL at 05:46

## 2023-01-01 RX ADMIN — ENOXAPARIN SODIUM 100 MILLIGRAM(S): 100 INJECTION SUBCUTANEOUS at 18:01

## 2023-01-01 RX ADMIN — Medication 1 MILLIGRAM(S): at 22:28

## 2023-01-01 RX ADMIN — Medication 2 TABLET(S): at 23:32

## 2023-01-01 RX ADMIN — ATORVASTATIN CALCIUM 20 MILLIGRAM(S): 80 TABLET, FILM COATED ORAL at 21:57

## 2023-01-01 RX ADMIN — AMIODARONE HYDROCHLORIDE 200 MILLIGRAM(S): 400 TABLET ORAL at 06:13

## 2023-01-01 RX ADMIN — PANTOPRAZOLE SODIUM 40 MILLIGRAM(S): 20 TABLET, DELAYED RELEASE ORAL at 21:26

## 2023-01-01 RX ADMIN — NYSTATIN CREAM 1 APPLICATION(S): 100000 CREAM TOPICAL at 23:58

## 2023-01-01 RX ADMIN — Medication 50 MILLILITER(S): at 06:49

## 2023-01-01 RX ADMIN — URSODIOL 300 MILLIGRAM(S): 250 TABLET, FILM COATED ORAL at 14:54

## 2023-01-01 RX ADMIN — URSODIOL 300 MILLIGRAM(S): 250 TABLET, FILM COATED ORAL at 15:21

## 2023-01-01 RX ADMIN — URSODIOL 300 MILLIGRAM(S): 250 TABLET, FILM COATED ORAL at 13:56

## 2023-01-01 RX ADMIN — Medication 10 MILLIGRAM(S): at 22:51

## 2023-01-01 RX ADMIN — AMIODARONE HYDROCHLORIDE 200 MILLIGRAM(S): 400 TABLET ORAL at 05:24

## 2023-01-01 RX ADMIN — I.V. FAT EMULSION 41.67 GM/KG/DAY: 20 EMULSION INTRAVENOUS at 17:08

## 2023-01-01 RX ADMIN — OCTREOTIDE ACETATE 100 MICROGRAM(S): 200 INJECTION, SOLUTION INTRAVENOUS; SUBCUTANEOUS at 09:45

## 2023-01-01 RX ADMIN — Medication 25 GRAM(S): at 07:07

## 2023-01-01 RX ADMIN — MORPHINE 6 MILLIGRAM(S): 10 SOLUTION ORAL at 11:41

## 2023-01-01 RX ADMIN — OCTREOTIDE ACETATE 100 MICROGRAM(S): 200 INJECTION, SOLUTION INTRAVENOUS; SUBCUTANEOUS at 18:53

## 2023-01-01 RX ADMIN — Medication 300 MILLIGRAM(S): at 10:20

## 2023-01-01 RX ADMIN — NYSTATIN CREAM 1 APPLICATION(S): 100000 CREAM TOPICAL at 13:15

## 2023-01-01 RX ADMIN — Medication 400 MILLIGRAM(S): at 15:42

## 2023-01-01 RX ADMIN — Medication 0.25 MILLIGRAM(S): at 21:17

## 2023-01-01 RX ADMIN — Medication 4 MILLIGRAM(S): at 15:17

## 2023-01-01 RX ADMIN — HEPARIN SODIUM 5000 UNIT(S): 5000 INJECTION INTRAVENOUS; SUBCUTANEOUS at 05:53

## 2023-01-01 RX ADMIN — CEFEPIME 100 MILLIGRAM(S): 1 INJECTION, POWDER, FOR SOLUTION INTRAMUSCULAR; INTRAVENOUS at 17:02

## 2023-01-01 RX ADMIN — Medication 4 MILLIGRAM(S): at 21:09

## 2023-01-01 RX ADMIN — AMIODARONE HYDROCHLORIDE 16.7 MG/MIN: 400 TABLET ORAL at 04:47

## 2023-01-01 RX ADMIN — OCTREOTIDE ACETATE 100 MICROGRAM(S): 200 INJECTION, SOLUTION INTRAVENOUS; SUBCUTANEOUS at 09:40

## 2023-01-01 RX ADMIN — Medication 20 MILLIEQUIVALENT(S): at 07:12

## 2023-01-01 RX ADMIN — SODIUM CHLORIDE 1000 MILLILITER(S): 9 INJECTION, SOLUTION INTRAVENOUS at 19:27

## 2023-01-01 RX ADMIN — AMLODIPINE BESYLATE 10 MILLIGRAM(S): 2.5 TABLET ORAL at 06:16

## 2023-01-01 RX ADMIN — ENOXAPARIN SODIUM 100 MILLIGRAM(S): 100 INJECTION SUBCUTANEOUS at 18:00

## 2023-01-01 RX ADMIN — CHLORHEXIDINE GLUCONATE 1 APPLICATION(S): 213 SOLUTION TOPICAL at 06:54

## 2023-01-01 RX ADMIN — BENZOCAINE AND MENTHOL 2 LOZENGE: 5; 1 LIQUID ORAL at 20:11

## 2023-01-01 RX ADMIN — HYDROMORPHONE HYDROCHLORIDE 0.5 MILLIGRAM(S): 2 INJECTION INTRAMUSCULAR; INTRAVENOUS; SUBCUTANEOUS at 14:25

## 2023-01-01 RX ADMIN — Medication 25 MILLIGRAM(S): at 11:12

## 2023-01-01 RX ADMIN — NYSTATIN CREAM 1 APPLICATION(S): 100000 CREAM TOPICAL at 14:23

## 2023-01-01 RX ADMIN — Medication 20 MILLIGRAM(S): at 00:45

## 2023-01-01 RX ADMIN — MORPHINE 6 MILLIGRAM(S): 10 SOLUTION ORAL at 12:15

## 2023-01-01 RX ADMIN — Medication 150 MILLIGRAM(S): at 13:34

## 2023-01-01 RX ADMIN — Medication 10 MILLIGRAM(S): at 23:29

## 2023-01-01 RX ADMIN — SODIUM CHLORIDE 120 MILLILITER(S): 9 INJECTION, SOLUTION INTRAVENOUS at 06:17

## 2023-01-01 RX ADMIN — IMIPENEM AND CILASTATIN 250 MILLIGRAM(S): 250; 250 INJECTION, POWDER, FOR SOLUTION INTRAVENOUS at 09:39

## 2023-01-01 RX ADMIN — Medication 1 EACH: at 17:53

## 2023-01-01 RX ADMIN — Medication 150 MILLIGRAM(S): at 17:27

## 2023-01-01 RX ADMIN — NYSTATIN CREAM 1 APPLICATION(S): 100000 CREAM TOPICAL at 08:02

## 2023-01-01 RX ADMIN — Medication 10 MILLILITER(S): at 14:43

## 2023-01-01 RX ADMIN — Medication 50 MILLILITER(S): at 08:00

## 2023-01-01 RX ADMIN — Medication 300 MILLIGRAM(S): at 11:21

## 2023-01-01 RX ADMIN — CHLORHEXIDINE GLUCONATE 1 APPLICATION(S): 213 SOLUTION TOPICAL at 06:01

## 2023-01-01 RX ADMIN — SODIUM CHLORIDE 100 MILLILITER(S): 9 INJECTION, SOLUTION INTRAVENOUS at 10:30

## 2023-01-01 RX ADMIN — Medication 4 MILLIGRAM(S): at 18:07

## 2023-01-01 RX ADMIN — HEPARIN SODIUM 5000 UNIT(S): 5000 INJECTION INTRAVENOUS; SUBCUTANEOUS at 05:00

## 2023-01-01 RX ADMIN — AMLODIPINE BESYLATE 10 MILLIGRAM(S): 2.5 TABLET ORAL at 06:31

## 2023-01-01 RX ADMIN — Medication 4 MILLIGRAM(S): at 12:23

## 2023-01-01 RX ADMIN — SODIUM CHLORIDE 1000 MILLILITER(S): 9 INJECTION, SOLUTION INTRAVENOUS at 18:22

## 2023-01-01 RX ADMIN — HEPARIN SODIUM 5000 UNIT(S): 5000 INJECTION INTRAVENOUS; SUBCUTANEOUS at 21:35

## 2023-01-01 RX ADMIN — SODIUM CHLORIDE 1000 MILLILITER(S): 9 INJECTION, SOLUTION INTRAVENOUS at 23:21

## 2023-01-01 RX ADMIN — Medication 25 MILLIGRAM(S): at 09:59

## 2023-01-01 RX ADMIN — URSODIOL 300 MILLIGRAM(S): 250 TABLET, FILM COATED ORAL at 23:13

## 2023-01-01 RX ADMIN — SODIUM CHLORIDE 1000 MILLILITER(S): 9 INJECTION INTRAMUSCULAR; INTRAVENOUS; SUBCUTANEOUS at 10:24

## 2023-01-01 RX ADMIN — Medication 2 TABLET(S): at 06:56

## 2023-01-01 RX ADMIN — Medication 1000 MILLIGRAM(S): at 16:00

## 2023-01-01 RX ADMIN — PANTOPRAZOLE SODIUM 40 MILLIGRAM(S): 20 TABLET, DELAYED RELEASE ORAL at 11:27

## 2023-01-01 RX ADMIN — HEPARIN SODIUM 5000 UNIT(S): 5000 INJECTION INTRAVENOUS; SUBCUTANEOUS at 06:53

## 2023-01-01 RX ADMIN — Medication 4 MILLIGRAM(S): at 09:40

## 2023-01-01 RX ADMIN — ENOXAPARIN SODIUM 100 MILLIGRAM(S): 100 INJECTION SUBCUTANEOUS at 10:24

## 2023-01-01 RX ADMIN — Medication 100 MILLIGRAM(S): at 05:51

## 2023-01-01 RX ADMIN — URSODIOL 300 MILLIGRAM(S): 250 TABLET, FILM COATED ORAL at 06:47

## 2023-01-01 RX ADMIN — PANTOPRAZOLE SODIUM 40 MILLIGRAM(S): 20 TABLET, DELAYED RELEASE ORAL at 11:28

## 2023-01-01 RX ADMIN — HYDROMORPHONE HYDROCHLORIDE 0.5 MILLIGRAM(S): 2 INJECTION INTRAMUSCULAR; INTRAVENOUS; SUBCUTANEOUS at 11:06

## 2023-01-01 RX ADMIN — NYSTATIN CREAM 1 APPLICATION(S): 100000 CREAM TOPICAL at 14:27

## 2023-01-01 RX ADMIN — Medication 50 MILLILITER(S): at 00:03

## 2023-01-01 RX ADMIN — HEPARIN SODIUM 5000 UNIT(S): 5000 INJECTION INTRAVENOUS; SUBCUTANEOUS at 06:07

## 2023-01-01 RX ADMIN — Medication 40 MICROGRAM(S): at 22:56

## 2023-01-01 RX ADMIN — Medication 4 MILLIGRAM(S): at 12:47

## 2023-01-01 RX ADMIN — Medication 10 MILLIGRAM(S): at 06:27

## 2023-01-01 RX ADMIN — Medication 25 MILLIGRAM(S): at 19:14

## 2023-01-01 RX ADMIN — Medication 4 MILLIGRAM(S): at 06:04

## 2023-01-01 RX ADMIN — HEPARIN SODIUM 5000 UNIT(S): 5000 INJECTION INTRAVENOUS; SUBCUTANEOUS at 07:03

## 2023-01-01 RX ADMIN — HEPARIN SODIUM 5000 UNIT(S): 5000 INJECTION INTRAVENOUS; SUBCUTANEOUS at 13:05

## 2023-01-01 RX ADMIN — Medication 5 MILLIGRAM(S): at 12:57

## 2023-01-01 RX ADMIN — URSODIOL 300 MILLIGRAM(S): 250 TABLET, FILM COATED ORAL at 13:13

## 2023-01-01 RX ADMIN — Medication 3 MILLILITER(S): at 18:01

## 2023-01-01 RX ADMIN — NYSTATIN CREAM 1 APPLICATION(S): 100000 CREAM TOPICAL at 17:48

## 2023-01-01 RX ADMIN — Medication 4 MILLIGRAM(S): at 07:17

## 2023-01-01 RX ADMIN — Medication 10 MILLIGRAM(S): at 13:03

## 2023-01-01 RX ADMIN — Medication 5 MILLIGRAM(S): at 12:21

## 2023-01-01 RX ADMIN — CHLORHEXIDINE GLUCONATE 1 APPLICATION(S): 213 SOLUTION TOPICAL at 06:49

## 2023-01-01 RX ADMIN — Medication 300 MILLIGRAM(S): at 12:01

## 2023-01-01 RX ADMIN — HEPARIN SODIUM 5000 UNIT(S): 5000 INJECTION INTRAVENOUS; SUBCUTANEOUS at 14:15

## 2023-01-01 RX ADMIN — Medication 100 MILLIGRAM(S): at 10:11

## 2023-01-01 RX ADMIN — Medication 30 MICROGRAM(S): at 22:32

## 2023-01-01 RX ADMIN — CHLORHEXIDINE GLUCONATE 1 APPLICATION(S): 213 SOLUTION TOPICAL at 07:58

## 2023-01-01 RX ADMIN — OCTREOTIDE ACETATE 100 MICROGRAM(S): 200 INJECTION, SOLUTION INTRAVENOUS; SUBCUTANEOUS at 09:16

## 2023-01-01 RX ADMIN — NYSTATIN CREAM 1 APPLICATION(S): 100000 CREAM TOPICAL at 14:00

## 2023-01-01 RX ADMIN — CHOLESTYRAMINE 4 GRAM(S): 4 POWDER, FOR SUSPENSION ORAL at 12:20

## 2023-01-01 RX ADMIN — Medication 1 SPRAY(S): at 17:42

## 2023-01-01 RX ADMIN — Medication 1 EACH: at 17:14

## 2023-01-01 RX ADMIN — HYDROMORPHONE HYDROCHLORIDE 0.5 MILLIGRAM(S): 2 INJECTION INTRAMUSCULAR; INTRAVENOUS; SUBCUTANEOUS at 17:15

## 2023-01-01 RX ADMIN — CHOLESTYRAMINE 4 GRAM(S): 4 POWDER, FOR SUSPENSION ORAL at 09:28

## 2023-01-01 RX ADMIN — PANTOPRAZOLE SODIUM 40 MILLIGRAM(S): 20 TABLET, DELAYED RELEASE ORAL at 12:20

## 2023-01-01 RX ADMIN — Medication 40 MICROGRAM(S): at 22:48

## 2023-01-01 RX ADMIN — Medication 10 MILLIGRAM(S): at 06:46

## 2023-01-01 RX ADMIN — ENOXAPARIN SODIUM 100 MILLIGRAM(S): 100 INJECTION SUBCUTANEOUS at 06:27

## 2023-01-01 RX ADMIN — AMIODARONE HYDROCHLORIDE 16.7 MG/MIN: 400 TABLET ORAL at 05:17

## 2023-01-01 RX ADMIN — PANTOPRAZOLE SODIUM 40 MILLIGRAM(S): 20 TABLET, DELAYED RELEASE ORAL at 06:44

## 2023-01-01 RX ADMIN — CHOLESTYRAMINE 4 GRAM(S): 4 POWDER, FOR SUSPENSION ORAL at 15:01

## 2023-01-01 RX ADMIN — CEFEPIME 100 MILLIGRAM(S): 1 INJECTION, POWDER, FOR SOLUTION INTRAMUSCULAR; INTRAVENOUS at 14:54

## 2023-01-01 RX ADMIN — HEPARIN SODIUM 5000 UNIT(S): 5000 INJECTION INTRAVENOUS; SUBCUTANEOUS at 06:24

## 2023-01-01 RX ADMIN — Medication 4 MILLIGRAM(S): at 06:33

## 2023-01-01 RX ADMIN — Medication 4 MILLIGRAM(S): at 21:34

## 2023-01-01 RX ADMIN — AMLODIPINE BESYLATE 10 MILLIGRAM(S): 2.5 TABLET ORAL at 07:08

## 2023-01-01 RX ADMIN — Medication 5 MILLIGRAM(S): at 05:41

## 2023-01-01 RX ADMIN — Medication 2 TABLET(S): at 05:13

## 2023-01-01 RX ADMIN — LOSARTAN POTASSIUM 25 MILLIGRAM(S): 100 TABLET, FILM COATED ORAL at 11:25

## 2023-01-01 RX ADMIN — Medication 5 MILLIGRAM(S): at 18:03

## 2023-01-01 RX ADMIN — Medication 5 MILLIGRAM(S): at 05:14

## 2023-01-01 RX ADMIN — Medication 25 MILLIGRAM(S): at 07:11

## 2023-01-01 RX ADMIN — PANTOPRAZOLE SODIUM 40 MILLIGRAM(S): 20 TABLET, DELAYED RELEASE ORAL at 09:34

## 2023-01-01 RX ADMIN — Medication 5 MILLIGRAM(S): at 19:15

## 2023-01-01 RX ADMIN — CHLORHEXIDINE GLUCONATE 1 APPLICATION(S): 213 SOLUTION TOPICAL at 05:23

## 2023-01-01 RX ADMIN — AMIODARONE HYDROCHLORIDE 200 MILLIGRAM(S): 400 TABLET ORAL at 05:14

## 2023-01-01 RX ADMIN — PANTOPRAZOLE SODIUM 40 MILLIGRAM(S): 20 TABLET, DELAYED RELEASE ORAL at 11:08

## 2023-01-01 RX ADMIN — Medication 10 MILLIGRAM(S): at 17:09

## 2023-01-01 RX ADMIN — CHLORHEXIDINE GLUCONATE 15 MILLILITER(S): 213 SOLUTION TOPICAL at 17:37

## 2023-01-01 RX ADMIN — NICARDIPINE HYDROCHLORIDE 25 MG/HR: 30 CAPSULE, EXTENDED RELEASE ORAL at 01:55

## 2023-01-01 RX ADMIN — Medication 50 MILLILITER(S): at 07:04

## 2023-01-01 RX ADMIN — I.V. FAT EMULSION 20.83 GM/KG/DAY: 20 EMULSION INTRAVENOUS at 18:06

## 2023-01-01 RX ADMIN — Medication 3 MILLILITER(S): at 13:03

## 2023-01-01 RX ADMIN — Medication 5 MILLIGRAM(S): at 21:19

## 2023-01-01 RX ADMIN — Medication 2 TABLET(S): at 12:32

## 2023-01-01 RX ADMIN — HEPARIN SODIUM 5000 UNIT(S): 5000 INJECTION INTRAVENOUS; SUBCUTANEOUS at 22:37

## 2023-01-01 RX ADMIN — OCTREOTIDE ACETATE 100 MICROGRAM(S): 200 INJECTION, SOLUTION INTRAVENOUS; SUBCUTANEOUS at 22:58

## 2023-01-01 RX ADMIN — SODIUM CHLORIDE 1000 MILLILITER(S): 9 INJECTION INTRAMUSCULAR; INTRAVENOUS; SUBCUTANEOUS at 09:22

## 2023-01-01 RX ADMIN — Medication 30 MICROGRAM(S): at 21:59

## 2023-01-01 RX ADMIN — PANTOPRAZOLE SODIUM 40 MILLIGRAM(S): 20 TABLET, DELAYED RELEASE ORAL at 12:27

## 2023-01-01 RX ADMIN — Medication 5 MILLIGRAM(S): at 05:02

## 2023-01-01 RX ADMIN — Medication 300 MILLIGRAM(S): at 09:09

## 2023-01-01 RX ADMIN — AMLODIPINE BESYLATE 5 MILLIGRAM(S): 2.5 TABLET ORAL at 13:07

## 2023-01-01 RX ADMIN — HEPARIN SODIUM 5000 UNIT(S): 5000 INJECTION INTRAVENOUS; SUBCUTANEOUS at 06:42

## 2023-01-01 RX ADMIN — Medication 30 MICROGRAM(S): at 21:56

## 2023-01-01 RX ADMIN — Medication 4 MILLIGRAM(S): at 21:16

## 2023-01-01 RX ADMIN — CHOLESTYRAMINE 4 GRAM(S): 4 POWDER, FOR SUSPENSION ORAL at 22:19

## 2023-01-01 RX ADMIN — HYDROMORPHONE HYDROCHLORIDE 0.5 MILLIGRAM(S): 2 INJECTION INTRAMUSCULAR; INTRAVENOUS; SUBCUTANEOUS at 16:10

## 2023-01-01 RX ADMIN — Medication 5 MILLIGRAM(S): at 23:36

## 2023-01-01 RX ADMIN — CHOLESTYRAMINE 4 GRAM(S): 4 POWDER, FOR SUSPENSION ORAL at 09:46

## 2023-01-01 RX ADMIN — HEPARIN SODIUM 5000 UNIT(S): 5000 INJECTION INTRAVENOUS; SUBCUTANEOUS at 13:10

## 2023-01-01 RX ADMIN — Medication 0.5 MILLIGRAM(S): at 00:03

## 2023-01-01 RX ADMIN — Medication 5 MILLIGRAM(S): at 17:25

## 2023-01-01 RX ADMIN — Medication 150 MILLIGRAM(S): at 18:17

## 2023-01-01 RX ADMIN — CHOLESTYRAMINE 4 GRAM(S): 4 POWDER, FOR SUSPENSION ORAL at 15:53

## 2023-01-01 RX ADMIN — Medication 1 MILLIGRAM(S): at 11:03

## 2023-01-01 RX ADMIN — NYSTATIN CREAM 1 APPLICATION(S): 100000 CREAM TOPICAL at 06:47

## 2023-01-01 RX ADMIN — Medication 25 MILLILITER(S): at 00:28

## 2023-01-01 RX ADMIN — Medication 4 MILLIGRAM(S): at 17:34

## 2023-01-01 RX ADMIN — HYDROMORPHONE HYDROCHLORIDE 0.5 MILLIGRAM(S): 2 INJECTION INTRAMUSCULAR; INTRAVENOUS; SUBCUTANEOUS at 13:06

## 2023-01-01 RX ADMIN — Medication 1 EACH: at 17:20

## 2023-01-01 RX ADMIN — Medication 100 MILLIEQUIVALENT(S): at 10:04

## 2023-01-01 RX ADMIN — SODIUM CHLORIDE 130 MILLILITER(S): 9 INJECTION, SOLUTION INTRAVENOUS at 17:21

## 2023-01-01 RX ADMIN — NYSTATIN CREAM 1 APPLICATION(S): 100000 CREAM TOPICAL at 21:09

## 2023-01-01 RX ADMIN — Medication 40 MICROGRAM(S): at 07:19

## 2023-01-01 RX ADMIN — Medication 2 TABLET(S): at 17:33

## 2023-01-01 RX ADMIN — SODIUM CHLORIDE 500 MILLILITER(S): 9 INJECTION, SOLUTION INTRAVENOUS at 01:48

## 2023-01-01 RX ADMIN — MORPHINE 6 MILLIGRAM(S): 10 SOLUTION ORAL at 18:14

## 2023-01-01 RX ADMIN — URSODIOL 300 MILLIGRAM(S): 250 TABLET, FILM COATED ORAL at 22:55

## 2023-01-01 RX ADMIN — ONDANSETRON 4 MILLIGRAM(S): 8 TABLET, FILM COATED ORAL at 08:04

## 2023-01-01 RX ADMIN — I.V. FAT EMULSION 41.67 GM/KG/DAY: 20 EMULSION INTRAVENOUS at 17:44

## 2023-01-01 RX ADMIN — NYSTATIN CREAM 1 APPLICATION(S): 100000 CREAM TOPICAL at 22:08

## 2023-01-01 RX ADMIN — URSODIOL 300 MILLIGRAM(S): 250 TABLET, FILM COATED ORAL at 05:58

## 2023-01-01 RX ADMIN — ENOXAPARIN SODIUM 90 MILLIGRAM(S): 100 INJECTION SUBCUTANEOUS at 15:34

## 2023-01-01 RX ADMIN — Medication 100 MILLIGRAM(S): at 14:03

## 2023-01-01 RX ADMIN — Medication 1 MILLIGRAM(S): at 21:21

## 2023-01-01 RX ADMIN — Medication 5 MILLIGRAM(S): at 22:21

## 2023-01-01 RX ADMIN — ONDANSETRON 4 MILLIGRAM(S): 8 TABLET, FILM COATED ORAL at 19:11

## 2023-01-01 RX ADMIN — Medication 5 MILLIGRAM(S): at 00:01

## 2023-01-01 RX ADMIN — Medication 3 MILLILITER(S): at 21:30

## 2023-01-01 RX ADMIN — Medication 25 MILLIGRAM(S): at 21:15

## 2023-01-01 RX ADMIN — URSODIOL 300 MILLIGRAM(S): 250 TABLET, FILM COATED ORAL at 15:00

## 2023-01-01 RX ADMIN — Medication 100 MILLIGRAM(S): at 15:45

## 2023-01-01 RX ADMIN — Medication 62.5 MILLIMOLE(S): at 07:56

## 2023-01-01 RX ADMIN — Medication 50 MILLILITER(S): at 09:00

## 2023-01-01 RX ADMIN — Medication 10 MILLIGRAM(S): at 12:56

## 2023-01-01 RX ADMIN — Medication 5 MILLIGRAM(S): at 23:04

## 2023-01-01 RX ADMIN — Medication 3 MILLILITER(S): at 05:33

## 2023-01-01 RX ADMIN — LOSARTAN POTASSIUM 25 MILLIGRAM(S): 100 TABLET, FILM COATED ORAL at 06:11

## 2023-01-01 RX ADMIN — NYSTATIN CREAM 1 APPLICATION(S): 100000 CREAM TOPICAL at 22:07

## 2023-01-01 RX ADMIN — URSODIOL 300 MILLIGRAM(S): 250 TABLET, FILM COATED ORAL at 05:36

## 2023-01-01 RX ADMIN — Medication 4 MILLIGRAM(S): at 18:04

## 2023-01-01 RX ADMIN — Medication 4 MILLIGRAM(S): at 11:34

## 2023-01-01 RX ADMIN — Medication 1 EACH: at 18:17

## 2023-01-01 RX ADMIN — ONDANSETRON 4 MILLIGRAM(S): 8 TABLET, FILM COATED ORAL at 00:27

## 2023-01-01 RX ADMIN — HEPARIN SODIUM 5000 UNIT(S): 5000 INJECTION INTRAVENOUS; SUBCUTANEOUS at 00:07

## 2023-01-01 RX ADMIN — Medication 5 MILLIGRAM(S): at 17:48

## 2023-01-01 RX ADMIN — OCTREOTIDE ACETATE 100 MICROGRAM(S): 200 INJECTION, SOLUTION INTRAVENOUS; SUBCUTANEOUS at 03:49

## 2023-01-01 RX ADMIN — Medication 25 MILLIGRAM(S): at 23:50

## 2023-01-01 RX ADMIN — OCTREOTIDE ACETATE 100 MICROGRAM(S): 200 INJECTION, SOLUTION INTRAVENOUS; SUBCUTANEOUS at 23:30

## 2023-01-01 RX ADMIN — Medication 5 MILLIGRAM(S): at 17:36

## 2023-01-01 RX ADMIN — Medication 150 MILLIGRAM(S): at 05:24

## 2023-01-01 RX ADMIN — IMIPENEM AND CILASTATIN 100 MILLIGRAM(S): 250; 250 INJECTION, POWDER, FOR SOLUTION INTRAVENOUS at 18:54

## 2023-01-01 RX ADMIN — URSODIOL 300 MILLIGRAM(S): 250 TABLET, FILM COATED ORAL at 14:33

## 2023-01-01 RX ADMIN — Medication 4 MILLIGRAM(S): at 11:21

## 2023-01-01 RX ADMIN — Medication 40 MICROGRAM(S): at 21:23

## 2023-01-01 RX ADMIN — PANTOPRAZOLE SODIUM 40 MILLIGRAM(S): 20 TABLET, DELAYED RELEASE ORAL at 11:59

## 2023-01-01 RX ADMIN — Medication 4 MILLIGRAM(S): at 11:53

## 2023-01-01 RX ADMIN — Medication 4 MILLIGRAM(S): at 06:17

## 2023-01-01 RX ADMIN — CHLORHEXIDINE GLUCONATE 1 APPLICATION(S): 213 SOLUTION TOPICAL at 06:13

## 2023-01-01 RX ADMIN — Medication 3 MILLILITER(S): at 16:53

## 2023-01-01 RX ADMIN — CHLORHEXIDINE GLUCONATE 1 APPLICATION(S): 213 SOLUTION TOPICAL at 11:40

## 2023-01-01 RX ADMIN — Medication 4 MILLIGRAM(S): at 12:58

## 2023-01-01 RX ADMIN — AMLODIPINE BESYLATE 10 MILLIGRAM(S): 2.5 TABLET ORAL at 07:07

## 2023-01-01 RX ADMIN — Medication 2 TABLET(S): at 07:47

## 2023-01-01 RX ADMIN — Medication 1 EACH: at 18:16

## 2023-01-01 RX ADMIN — URSODIOL 300 MILLIGRAM(S): 250 TABLET, FILM COATED ORAL at 05:26

## 2023-01-01 RX ADMIN — CHOLESTYRAMINE 4 GRAM(S): 4 POWDER, FOR SUSPENSION ORAL at 20:59

## 2023-01-01 RX ADMIN — ONDANSETRON 4 MILLIGRAM(S): 8 TABLET, FILM COATED ORAL at 18:06

## 2023-01-01 RX ADMIN — Medication 2 TABLET(S): at 23:38

## 2023-01-01 RX ADMIN — HEPARIN SODIUM 5000 UNIT(S): 5000 INJECTION INTRAVENOUS; SUBCUTANEOUS at 06:19

## 2023-01-01 RX ADMIN — SODIUM CHLORIDE 500 MILLILITER(S): 9 INJECTION, SOLUTION INTRAVENOUS at 03:50

## 2023-01-01 RX ADMIN — Medication 10 MILLIGRAM(S): at 07:45

## 2023-01-01 RX ADMIN — Medication 40 MILLIGRAM(S): at 00:09

## 2023-01-01 RX ADMIN — Medication 4 MILLIGRAM(S): at 17:22

## 2023-01-01 RX ADMIN — I.V. FAT EMULSION 20.8 GM/KG/DAY: 20 EMULSION INTRAVENOUS at 18:01

## 2023-01-01 RX ADMIN — CHOLESTYRAMINE 4 GRAM(S): 4 POWDER, FOR SUSPENSION ORAL at 08:28

## 2023-01-01 RX ADMIN — Medication 50 MICROGRAM(S): at 05:24

## 2023-01-01 RX ADMIN — ATORVASTATIN CALCIUM 20 MILLIGRAM(S): 80 TABLET, FILM COATED ORAL at 22:56

## 2023-01-01 RX ADMIN — Medication 4 MILLIGRAM(S): at 23:35

## 2023-01-01 RX ADMIN — Medication 2 TABLET(S): at 17:30

## 2023-01-01 RX ADMIN — Medication 5 MILLIGRAM(S): at 23:02

## 2023-01-01 RX ADMIN — Medication 0.5 MILLIGRAM(S): at 01:28

## 2023-01-01 RX ADMIN — Medication 5 MILLIGRAM(S): at 10:22

## 2023-01-01 RX ADMIN — HEPARIN SODIUM 5000 UNIT(S): 5000 INJECTION INTRAVENOUS; SUBCUTANEOUS at 05:23

## 2023-01-01 RX ADMIN — URSODIOL 300 MILLIGRAM(S): 250 TABLET, FILM COATED ORAL at 06:40

## 2023-01-01 RX ADMIN — I.V. FAT EMULSION 41.67 GM/KG/DAY: 20 EMULSION INTRAVENOUS at 18:58

## 2023-01-01 RX ADMIN — DAPTOMYCIN 132 MILLIGRAM(S): 500 INJECTION, POWDER, LYOPHILIZED, FOR SOLUTION INTRAVENOUS at 15:45

## 2023-01-01 RX ADMIN — OCTREOTIDE ACETATE 100 MICROGRAM(S): 200 INJECTION, SOLUTION INTRAVENOUS; SUBCUTANEOUS at 02:05

## 2023-01-01 RX ADMIN — URSODIOL 300 MILLIGRAM(S): 250 TABLET, FILM COATED ORAL at 13:32

## 2023-01-01 RX ADMIN — CHLORHEXIDINE GLUCONATE 1 APPLICATION(S): 213 SOLUTION TOPICAL at 07:05

## 2023-01-01 RX ADMIN — HEPARIN SODIUM 5000 UNIT(S): 5000 INJECTION INTRAVENOUS; SUBCUTANEOUS at 12:24

## 2023-01-01 RX ADMIN — Medication 150 MILLIGRAM(S): at 06:34

## 2023-01-01 RX ADMIN — Medication 5 MILLIGRAM(S): at 00:38

## 2023-01-01 RX ADMIN — Medication 10 MILLIGRAM(S): at 23:24

## 2023-01-01 RX ADMIN — OCTREOTIDE ACETATE 100 MICROGRAM(S): 200 INJECTION, SOLUTION INTRAVENOUS; SUBCUTANEOUS at 06:14

## 2023-01-01 RX ADMIN — URSODIOL 300 MILLIGRAM(S): 250 TABLET, FILM COATED ORAL at 07:35

## 2023-01-01 RX ADMIN — HYDROMORPHONE HYDROCHLORIDE 0.2 MILLIGRAM(S): 2 INJECTION INTRAMUSCULAR; INTRAVENOUS; SUBCUTANEOUS at 02:00

## 2023-01-01 RX ADMIN — SIMETHICONE 80 MILLIGRAM(S): 80 TABLET, CHEWABLE ORAL at 19:55

## 2023-01-01 RX ADMIN — Medication 5 MILLIGRAM(S): at 19:08

## 2023-01-01 RX ADMIN — SODIUM CHLORIDE 1000 MILLILITER(S): 9 INJECTION INTRAMUSCULAR; INTRAVENOUS; SUBCUTANEOUS at 05:36

## 2023-01-01 RX ADMIN — Medication 50 MILLILITER(S): at 23:47

## 2023-01-01 RX ADMIN — I.V. FAT EMULSION 21.4 GM/KG/DAY: 20 EMULSION INTRAVENOUS at 17:36

## 2023-01-01 RX ADMIN — Medication 4 MILLIGRAM(S): at 18:51

## 2023-01-01 RX ADMIN — Medication 5 MILLIGRAM(S): at 10:49

## 2023-01-01 RX ADMIN — Medication 65 MILLILITER(S): at 01:18

## 2023-01-01 RX ADMIN — I.V. FAT EMULSION 20.83 GM/KG/DAY: 20 EMULSION INTRAVENOUS at 17:51

## 2023-01-01 RX ADMIN — INSULIN HUMAN 10 UNIT(S): 100 INJECTION, SOLUTION SUBCUTANEOUS at 19:48

## 2023-01-01 RX ADMIN — Medication 65 MILLILITER(S): at 09:33

## 2023-01-01 RX ADMIN — HYDROMORPHONE HYDROCHLORIDE 0.5 MILLIGRAM(S): 2 INJECTION INTRAMUSCULAR; INTRAVENOUS; SUBCUTANEOUS at 08:04

## 2023-01-01 RX ADMIN — Medication 1 EACH: at 18:30

## 2023-01-01 RX ADMIN — Medication 25 MILLIGRAM(S): at 19:40

## 2023-01-01 RX ADMIN — Medication 4 MILLIGRAM(S): at 22:15

## 2023-01-01 RX ADMIN — URSODIOL 300 MILLIGRAM(S): 250 TABLET, FILM COATED ORAL at 05:34

## 2023-01-01 RX ADMIN — CHLORHEXIDINE GLUCONATE 1 APPLICATION(S): 213 SOLUTION TOPICAL at 05:30

## 2023-01-01 RX ADMIN — PANTOPRAZOLE SODIUM 40 MILLIGRAM(S): 20 TABLET, DELAYED RELEASE ORAL at 09:39

## 2023-01-01 RX ADMIN — Medication 5 MILLIGRAM(S): at 09:22

## 2023-01-01 RX ADMIN — Medication 40 MICROGRAM(S): at 22:42

## 2023-01-01 RX ADMIN — Medication 150 MILLIGRAM(S): at 11:18

## 2023-01-01 RX ADMIN — Medication 25 MILLIGRAM(S): at 10:58

## 2023-01-01 RX ADMIN — Medication 2 TABLET(S): at 21:13

## 2023-01-01 RX ADMIN — Medication 50 MILLILITER(S): at 19:30

## 2023-01-01 RX ADMIN — MORPHINE 6 MILLIGRAM(S): 10 SOLUTION ORAL at 18:10

## 2023-01-01 RX ADMIN — SODIUM CHLORIDE 120 MILLILITER(S): 9 INJECTION INTRAMUSCULAR; INTRAVENOUS; SUBCUTANEOUS at 08:53

## 2023-01-01 RX ADMIN — CEFTRIAXONE 100 MILLIGRAM(S): 500 INJECTION, POWDER, FOR SOLUTION INTRAMUSCULAR; INTRAVENOUS at 22:00

## 2023-01-01 RX ADMIN — Medication 0.5 MILLIGRAM(S): at 06:16

## 2023-01-01 RX ADMIN — HEPARIN SODIUM 5000 UNIT(S): 5000 INJECTION INTRAVENOUS; SUBCUTANEOUS at 21:47

## 2023-01-01 RX ADMIN — Medication 5 MILLIGRAM(S): at 22:24

## 2023-01-01 RX ADMIN — Medication 2 TABLET(S): at 17:57

## 2023-01-01 RX ADMIN — URSODIOL 300 MILLIGRAM(S): 250 TABLET, FILM COATED ORAL at 13:36

## 2023-01-01 RX ADMIN — HEPARIN SODIUM 5000 UNIT(S): 5000 INJECTION INTRAVENOUS; SUBCUTANEOUS at 15:52

## 2023-01-01 RX ADMIN — Medication 200 GRAM(S): at 21:18

## 2023-01-01 RX ADMIN — URSODIOL 300 MILLIGRAM(S): 250 TABLET, FILM COATED ORAL at 21:47

## 2023-01-01 RX ADMIN — HEPARIN SODIUM 5000 UNIT(S): 5000 INJECTION INTRAVENOUS; SUBCUTANEOUS at 13:36

## 2023-01-01 RX ADMIN — Medication 100 MILLIEQUIVALENT(S): at 11:38

## 2023-01-01 RX ADMIN — ONDANSETRON 4 MILLIGRAM(S): 8 TABLET, FILM COATED ORAL at 15:55

## 2023-01-01 RX ADMIN — NYSTATIN CREAM 1 APPLICATION(S): 100000 CREAM TOPICAL at 16:00

## 2023-01-01 RX ADMIN — SODIUM CHLORIDE 500 MILLILITER(S): 9 INJECTION, SOLUTION INTRAVENOUS at 11:30

## 2023-01-01 RX ADMIN — DIATRIZOATE MEGLUMINE 30 MILLILITER(S): 180 INJECTION, SOLUTION INTRAVESICAL at 11:15

## 2023-01-01 RX ADMIN — NYSTATIN CREAM 1 APPLICATION(S): 100000 CREAM TOPICAL at 22:38

## 2023-01-01 RX ADMIN — SODIUM CHLORIDE 1000 MILLILITER(S): 9 INJECTION, SOLUTION INTRAVENOUS at 10:24

## 2023-01-01 RX ADMIN — OCTREOTIDE ACETATE 100 MICROGRAM(S): 200 INJECTION, SOLUTION INTRAVENOUS; SUBCUTANEOUS at 00:17

## 2023-01-01 RX ADMIN — Medication 0.5 MILLIGRAM(S): at 17:35

## 2023-01-01 RX ADMIN — CHLORHEXIDINE GLUCONATE 1 APPLICATION(S): 213 SOLUTION TOPICAL at 05:53

## 2023-01-01 RX ADMIN — PANTOPRAZOLE SODIUM 40 MILLIGRAM(S): 20 TABLET, DELAYED RELEASE ORAL at 09:32

## 2023-01-01 RX ADMIN — Medication 150 MILLIGRAM(S): at 16:31

## 2023-01-01 RX ADMIN — Medication 1000 MILLIGRAM(S): at 04:44

## 2023-01-01 RX ADMIN — Medication 150 MILLIGRAM(S): at 18:31

## 2023-01-01 RX ADMIN — Medication 5 MILLIGRAM(S): at 05:47

## 2023-01-01 RX ADMIN — SODIUM CHLORIDE 1000 MILLILITER(S): 9 INJECTION INTRAMUSCULAR; INTRAVENOUS; SUBCUTANEOUS at 18:26

## 2023-01-01 RX ADMIN — MORPHINE 6 MILLIGRAM(S): 10 SOLUTION ORAL at 06:26

## 2023-01-01 RX ADMIN — AMLODIPINE BESYLATE 10 MILLIGRAM(S): 2.5 TABLET ORAL at 07:41

## 2023-01-01 RX ADMIN — SODIUM CHLORIDE 1000 MILLILITER(S): 9 INJECTION INTRAMUSCULAR; INTRAVENOUS; SUBCUTANEOUS at 11:33

## 2023-01-01 RX ADMIN — AMIODARONE HYDROCHLORIDE 200 MILLIGRAM(S): 400 TABLET ORAL at 06:35

## 2023-01-01 RX ADMIN — HYDROMORPHONE HYDROCHLORIDE 0.5 MILLIGRAM(S): 2 INJECTION INTRAMUSCULAR; INTRAVENOUS; SUBCUTANEOUS at 22:41

## 2023-01-01 RX ADMIN — Medication 100 GRAM(S): at 10:09

## 2023-01-01 RX ADMIN — Medication 1 EACH: at 17:51

## 2023-01-01 RX ADMIN — Medication 12.5 MILLILITER(S): at 02:13

## 2023-01-01 RX ADMIN — MORPHINE 6 MILLIGRAM(S): 10 SOLUTION ORAL at 23:16

## 2023-01-01 RX ADMIN — Medication 40 MICROGRAM(S): at 23:08

## 2023-01-01 RX ADMIN — CHOLESTYRAMINE 4 GRAM(S): 4 POWDER, FOR SUSPENSION ORAL at 08:48

## 2023-01-01 RX ADMIN — Medication 5 MILLIGRAM(S): at 16:20

## 2023-01-01 RX ADMIN — URSODIOL 300 MILLIGRAM(S): 250 TABLET, FILM COATED ORAL at 22:52

## 2023-01-01 RX ADMIN — CHOLESTYRAMINE 4 GRAM(S): 4 POWDER, FOR SUSPENSION ORAL at 14:52

## 2023-01-01 RX ADMIN — URSODIOL 300 MILLIGRAM(S): 250 TABLET, FILM COATED ORAL at 06:43

## 2023-01-01 RX ADMIN — SODIUM CHLORIDE 500 MILLILITER(S): 9 INJECTION, SOLUTION INTRAVENOUS at 12:51

## 2023-01-01 RX ADMIN — HYDROMORPHONE HYDROCHLORIDE 0.5 MILLIGRAM(S): 2 INJECTION INTRAMUSCULAR; INTRAVENOUS; SUBCUTANEOUS at 13:55

## 2023-01-01 RX ADMIN — HEPARIN SODIUM 5000 UNIT(S): 5000 INJECTION INTRAVENOUS; SUBCUTANEOUS at 07:47

## 2023-01-01 RX ADMIN — HYDROMORPHONE HYDROCHLORIDE 0.5 MILLIGRAM(S): 2 INJECTION INTRAMUSCULAR; INTRAVENOUS; SUBCUTANEOUS at 15:39

## 2023-01-01 RX ADMIN — AMLODIPINE BESYLATE 10 MILLIGRAM(S): 2.5 TABLET ORAL at 18:10

## 2023-01-01 RX ADMIN — Medication 4 MILLIGRAM(S): at 23:48

## 2023-01-01 RX ADMIN — HYDROMORPHONE HYDROCHLORIDE 0.5 MILLIGRAM(S): 2 INJECTION INTRAMUSCULAR; INTRAVENOUS; SUBCUTANEOUS at 13:21

## 2023-01-01 RX ADMIN — Medication 25 MILLIGRAM(S): at 10:18

## 2023-01-01 RX ADMIN — URSODIOL 300 MILLIGRAM(S): 250 TABLET, FILM COATED ORAL at 07:42

## 2023-01-01 RX ADMIN — OCTREOTIDE ACETATE 100 MICROGRAM(S): 200 INJECTION, SOLUTION INTRAVENOUS; SUBCUTANEOUS at 08:24

## 2023-01-01 RX ADMIN — NYSTATIN CREAM 1 APPLICATION(S): 100000 CREAM TOPICAL at 14:09

## 2023-01-01 RX ADMIN — Medication 100 MILLIEQUIVALENT(S): at 10:07

## 2023-01-01 RX ADMIN — OCTREOTIDE ACETATE 100 MICROGRAM(S): 200 INJECTION, SOLUTION INTRAVENOUS; SUBCUTANEOUS at 09:09

## 2023-01-01 RX ADMIN — MORPHINE 2 MILLIGRAM(S): 10 SOLUTION ORAL at 00:37

## 2023-01-01 RX ADMIN — IMIPENEM AND CILASTATIN 250 MILLIGRAM(S): 250; 250 INJECTION, POWDER, FOR SOLUTION INTRAVENOUS at 19:22

## 2023-01-01 RX ADMIN — IMIPENEM AND CILASTATIN 250 MILLIGRAM(S): 250; 250 INJECTION, POWDER, FOR SOLUTION INTRAVENOUS at 19:08

## 2023-01-01 RX ADMIN — LOSARTAN POTASSIUM 25 MILLIGRAM(S): 100 TABLET, FILM COATED ORAL at 05:05

## 2023-01-01 RX ADMIN — SODIUM CHLORIDE 40 MILLILITER(S): 9 INJECTION INTRAMUSCULAR; INTRAVENOUS; SUBCUTANEOUS at 00:39

## 2023-01-01 RX ADMIN — ATORVASTATIN CALCIUM 20 MILLIGRAM(S): 80 TABLET, FILM COATED ORAL at 22:12

## 2023-01-01 RX ADMIN — URSODIOL 300 MILLIGRAM(S): 250 TABLET, FILM COATED ORAL at 06:50

## 2023-01-01 RX ADMIN — MORPHINE 6 MILLIGRAM(S): 10 SOLUTION ORAL at 17:50

## 2023-01-01 RX ADMIN — Medication 100 GRAM(S): at 07:50

## 2023-01-01 RX ADMIN — Medication 150 MILLIGRAM(S): at 06:16

## 2023-01-01 RX ADMIN — Medication 150 MILLIGRAM(S): at 17:39

## 2023-01-01 RX ADMIN — URSODIOL 300 MILLIGRAM(S): 250 TABLET, FILM COATED ORAL at 05:09

## 2023-01-01 RX ADMIN — CHLORHEXIDINE GLUCONATE 1 APPLICATION(S): 213 SOLUTION TOPICAL at 06:40

## 2023-01-01 RX ADMIN — ONDANSETRON 4 MILLIGRAM(S): 8 TABLET, FILM COATED ORAL at 14:46

## 2023-01-01 RX ADMIN — URSODIOL 300 MILLIGRAM(S): 250 TABLET, FILM COATED ORAL at 15:03

## 2023-01-01 RX ADMIN — Medication 25 MILLIGRAM(S): at 10:05

## 2023-01-01 RX ADMIN — CHLORHEXIDINE GLUCONATE 1 APPLICATION(S): 213 SOLUTION TOPICAL at 06:50

## 2023-01-01 RX ADMIN — Medication 4 MILLIGRAM(S): at 21:58

## 2023-01-01 RX ADMIN — HYDROMORPHONE HYDROCHLORIDE 0.5 MILLIGRAM(S): 2 INJECTION INTRAMUSCULAR; INTRAVENOUS; SUBCUTANEOUS at 16:57

## 2023-01-01 RX ADMIN — IRON SUCROSE 110 MILLIGRAM(S): 20 INJECTION, SOLUTION INTRAVENOUS at 13:40

## 2023-01-01 RX ADMIN — URSODIOL 300 MILLIGRAM(S): 250 TABLET, FILM COATED ORAL at 22:06

## 2023-01-01 RX ADMIN — AMLODIPINE BESYLATE 10 MILLIGRAM(S): 2.5 TABLET ORAL at 07:12

## 2023-01-01 RX ADMIN — Medication 5 MILLIGRAM(S): at 12:06

## 2023-01-01 RX ADMIN — OCTREOTIDE ACETATE 100 MICROGRAM(S): 200 INJECTION, SOLUTION INTRAVENOUS; SUBCUTANEOUS at 02:39

## 2023-01-01 RX ADMIN — SODIUM CHLORIDE 130 MILLILITER(S): 9 INJECTION, SOLUTION INTRAVENOUS at 14:19

## 2023-01-01 RX ADMIN — Medication 2 TABLET(S): at 12:33

## 2023-01-01 RX ADMIN — Medication 100 MILLIEQUIVALENT(S): at 11:05

## 2023-01-01 RX ADMIN — MORPHINE 6 MILLIGRAM(S): 10 SOLUTION ORAL at 12:44

## 2023-01-01 RX ADMIN — I.V. FAT EMULSION 41.67 GM/KG/DAY: 20 EMULSION INTRAVENOUS at 17:23

## 2023-01-01 RX ADMIN — Medication 50 MICROGRAM(S): at 07:28

## 2023-01-01 RX ADMIN — CHLORHEXIDINE GLUCONATE 1 APPLICATION(S): 213 SOLUTION TOPICAL at 11:26

## 2023-01-01 RX ADMIN — Medication 2 TABLET(S): at 12:54

## 2023-01-01 RX ADMIN — Medication 250 MILLIGRAM(S): at 19:51

## 2023-01-01 RX ADMIN — NYSTATIN CREAM 1 APPLICATION(S): 100000 CREAM TOPICAL at 05:04

## 2023-01-01 RX ADMIN — DAPTOMYCIN 132 MILLIGRAM(S): 500 INJECTION, POWDER, LYOPHILIZED, FOR SOLUTION INTRAVENOUS at 16:30

## 2023-01-01 RX ADMIN — Medication 25 MILLIGRAM(S): at 11:09

## 2023-01-01 RX ADMIN — Medication 10 MILLIGRAM(S): at 16:02

## 2023-01-01 RX ADMIN — Medication 250 MILLIGRAM(S): at 22:32

## 2023-01-01 RX ADMIN — ONDANSETRON 4 MILLIGRAM(S): 8 TABLET, FILM COATED ORAL at 18:11

## 2023-01-01 RX ADMIN — PANTOPRAZOLE SODIUM 40 MILLIGRAM(S): 20 TABLET, DELAYED RELEASE ORAL at 10:09

## 2023-01-01 RX ADMIN — Medication 0.5 MILLIGRAM(S): at 05:52

## 2023-01-01 RX ADMIN — HEPARIN SODIUM 5000 UNIT(S): 5000 INJECTION INTRAVENOUS; SUBCUTANEOUS at 07:16

## 2023-01-01 RX ADMIN — URSODIOL 300 MILLIGRAM(S): 250 TABLET, FILM COATED ORAL at 23:29

## 2023-01-01 RX ADMIN — Medication 25 MILLIGRAM(S): at 22:01

## 2023-01-01 RX ADMIN — OCTREOTIDE ACETATE 100 MICROGRAM(S): 200 INJECTION, SOLUTION INTRAVENOUS; SUBCUTANEOUS at 00:09

## 2023-01-01 RX ADMIN — MORPHINE 6 MILLIGRAM(S): 10 SOLUTION ORAL at 11:54

## 2023-01-01 RX ADMIN — Medication 25 MILLIGRAM(S): at 10:07

## 2023-01-01 RX ADMIN — Medication 150 MILLIGRAM(S): at 12:47

## 2023-01-01 RX ADMIN — URSODIOL 300 MILLIGRAM(S): 250 TABLET, FILM COATED ORAL at 14:09

## 2023-01-01 RX ADMIN — NYSTATIN CREAM 1 APPLICATION(S): 100000 CREAM TOPICAL at 23:50

## 2023-01-01 RX ADMIN — MEROPENEM 100 MILLIGRAM(S): 1 INJECTION INTRAVENOUS at 21:56

## 2023-01-01 RX ADMIN — HEPARIN SODIUM 5000 UNIT(S): 5000 INJECTION INTRAVENOUS; SUBCUTANEOUS at 23:07

## 2023-01-01 RX ADMIN — Medication 10 MILLIGRAM(S): at 11:50

## 2023-01-01 RX ADMIN — Medication 250 MILLIGRAM(S): at 03:22

## 2023-01-01 RX ADMIN — ERGOCALCIFEROL 50000 UNIT(S): 1.25 CAPSULE ORAL at 11:58

## 2023-01-01 RX ADMIN — OCTREOTIDE ACETATE 100 MICROGRAM(S): 200 INJECTION, SOLUTION INTRAVENOUS; SUBCUTANEOUS at 17:55

## 2023-01-01 RX ADMIN — HEPARIN SODIUM 5000 UNIT(S): 5000 INJECTION INTRAVENOUS; SUBCUTANEOUS at 05:45

## 2023-01-01 RX ADMIN — ATORVASTATIN CALCIUM 20 MILLIGRAM(S): 80 TABLET, FILM COATED ORAL at 21:14

## 2023-01-01 RX ADMIN — IOHEXOL 30 MILLILITER(S): 300 INJECTION, SOLUTION INTRAVENOUS at 17:27

## 2023-01-01 RX ADMIN — Medication 2 TABLET(S): at 13:44

## 2023-01-01 RX ADMIN — Medication 1 EACH: at 18:14

## 2023-01-01 RX ADMIN — Medication 10 MILLIGRAM(S): at 18:28

## 2023-01-01 RX ADMIN — Medication 5 MILLIGRAM(S): at 06:26

## 2023-01-01 RX ADMIN — Medication 62.5 MILLIMOLE(S): at 09:04

## 2023-01-01 RX ADMIN — Medication 40 MICROGRAM(S): at 21:28

## 2023-01-01 RX ADMIN — CHOLESTYRAMINE 4 GRAM(S): 4 POWDER, FOR SUSPENSION ORAL at 11:34

## 2023-01-01 RX ADMIN — ATORVASTATIN CALCIUM 20 MILLIGRAM(S): 80 TABLET, FILM COATED ORAL at 21:15

## 2023-01-01 RX ADMIN — NYSTATIN CREAM 1 APPLICATION(S): 100000 CREAM TOPICAL at 15:04

## 2023-01-01 RX ADMIN — Medication 30 MICROGRAM(S): at 21:11

## 2023-01-01 RX ADMIN — I.V. FAT EMULSION 40.8 GM/KG/DAY: 20 EMULSION INTRAVENOUS at 17:17

## 2023-01-01 RX ADMIN — Medication 150 MILLIGRAM(S): at 11:10

## 2023-01-01 RX ADMIN — Medication 1 SPRAY(S): at 22:53

## 2023-01-01 RX ADMIN — Medication 4 MILLIGRAM(S): at 06:08

## 2023-01-01 RX ADMIN — Medication 20 MILLIGRAM(S): at 11:09

## 2023-01-01 RX ADMIN — Medication 4 MILLIGRAM(S): at 17:58

## 2023-01-01 RX ADMIN — Medication 4 MILLIGRAM(S): at 11:35

## 2023-01-01 RX ADMIN — SODIUM CHLORIDE 60 MILLILITER(S): 9 INJECTION, SOLUTION INTRAVENOUS at 10:12

## 2023-01-01 RX ADMIN — Medication 150 MILLIGRAM(S): at 17:06

## 2023-01-01 RX ADMIN — Medication 4 MILLIGRAM(S): at 11:23

## 2023-01-01 RX ADMIN — Medication 2 TABLET(S): at 09:22

## 2023-01-01 RX ADMIN — CHOLESTYRAMINE 4 GRAM(S): 4 POWDER, FOR SUSPENSION ORAL at 11:56

## 2023-01-01 RX ADMIN — Medication 2 TABLET(S): at 23:33

## 2023-01-01 RX ADMIN — NYSTATIN CREAM 1 APPLICATION(S): 100000 CREAM TOPICAL at 06:53

## 2023-01-01 RX ADMIN — I.V. FAT EMULSION 21 GM/KG/DAY: 20 EMULSION INTRAVENOUS at 17:18

## 2023-01-01 RX ADMIN — IMIPENEM AND CILASTATIN 100 MILLIGRAM(S): 250; 250 INJECTION, POWDER, FOR SOLUTION INTRAVENOUS at 17:13

## 2023-01-01 RX ADMIN — Medication 40 MILLIGRAM(S): at 10:34

## 2023-01-01 RX ADMIN — I.V. FAT EMULSION 41.67 GM/KG/DAY: 20 EMULSION INTRAVENOUS at 18:00

## 2023-01-01 RX ADMIN — OCTREOTIDE ACETATE 100 MICROGRAM(S): 200 INJECTION, SOLUTION INTRAVENOUS; SUBCUTANEOUS at 21:25

## 2023-01-01 RX ADMIN — Medication 2 TABLET(S): at 23:48

## 2023-01-01 RX ADMIN — AMIODARONE HYDROCHLORIDE 200 MILLIGRAM(S): 400 TABLET ORAL at 07:41

## 2023-01-01 RX ADMIN — Medication 75 MILLILITER(S): at 07:01

## 2023-01-01 RX ADMIN — HYDROMORPHONE HYDROCHLORIDE 0.5 MILLIGRAM(S): 2 INJECTION INTRAMUSCULAR; INTRAVENOUS; SUBCUTANEOUS at 06:55

## 2023-01-01 RX ADMIN — HEPARIN SODIUM 5000 UNIT(S): 5000 INJECTION INTRAVENOUS; SUBCUTANEOUS at 23:21

## 2023-01-01 RX ADMIN — OCTREOTIDE ACETATE 100 MICROGRAM(S): 200 INJECTION, SOLUTION INTRAVENOUS; SUBCUTANEOUS at 16:40

## 2023-01-01 RX ADMIN — PANTOPRAZOLE SODIUM 40 MILLIGRAM(S): 20 TABLET, DELAYED RELEASE ORAL at 12:43

## 2023-01-01 RX ADMIN — URSODIOL 300 MILLIGRAM(S): 250 TABLET, FILM COATED ORAL at 21:21

## 2023-01-01 RX ADMIN — CHLORHEXIDINE GLUCONATE 1 APPLICATION(S): 213 SOLUTION TOPICAL at 05:46

## 2023-01-01 RX ADMIN — BENZOCAINE AND MENTHOL 1 LOZENGE: 5; 1 LIQUID ORAL at 21:49

## 2023-01-01 RX ADMIN — Medication 75 MILLILITER(S): at 18:00

## 2023-01-01 RX ADMIN — AMLODIPINE BESYLATE 10 MILLIGRAM(S): 2.5 TABLET ORAL at 05:43

## 2023-01-01 RX ADMIN — Medication 2 TABLET(S): at 16:56

## 2023-01-01 RX ADMIN — IMIPENEM AND CILASTATIN 250 MILLIGRAM(S): 250; 250 INJECTION, POWDER, FOR SOLUTION INTRAVENOUS at 01:55

## 2023-01-01 RX ADMIN — POTASSIUM PHOSPHATE, MONOBASIC POTASSIUM PHOSPHATE, DIBASIC 83.33 MILLIMOLE(S): 236; 224 INJECTION, SOLUTION INTRAVENOUS at 11:48

## 2023-01-01 RX ADMIN — OCTREOTIDE ACETATE 100 MICROGRAM(S): 200 INJECTION, SOLUTION INTRAVENOUS; SUBCUTANEOUS at 18:05

## 2023-01-01 RX ADMIN — Medication 50 MILLILITER(S): at 23:51

## 2023-01-01 RX ADMIN — CHOLESTYRAMINE 4 GRAM(S): 4 POWDER, FOR SUSPENSION ORAL at 11:12

## 2023-01-01 RX ADMIN — Medication 50 MILLILITER(S): at 00:00

## 2023-01-01 RX ADMIN — Medication 5 MILLIGRAM(S): at 16:39

## 2023-01-01 RX ADMIN — ENOXAPARIN SODIUM 100 MILLIGRAM(S): 100 INJECTION SUBCUTANEOUS at 17:25

## 2023-01-01 RX ADMIN — OCTREOTIDE ACETATE 100 MICROGRAM(S): 200 INJECTION, SOLUTION INTRAVENOUS; SUBCUTANEOUS at 02:52

## 2023-01-01 RX ADMIN — Medication 10 MILLIGRAM(S): at 02:17

## 2023-01-01 RX ADMIN — CHLORHEXIDINE GLUCONATE 15 MILLILITER(S): 213 SOLUTION TOPICAL at 18:25

## 2023-01-01 RX ADMIN — AMIODARONE HYDROCHLORIDE 16.7 MG/MIN: 400 TABLET ORAL at 07:28

## 2023-01-01 RX ADMIN — Medication 2: at 07:30

## 2023-01-01 RX ADMIN — Medication 5 MILLIGRAM(S): at 12:05

## 2023-01-01 RX ADMIN — Medication 1000 MILLIGRAM(S): at 03:10

## 2023-01-01 RX ADMIN — ONDANSETRON 4 MILLIGRAM(S): 8 TABLET, FILM COATED ORAL at 08:17

## 2023-01-01 RX ADMIN — URSODIOL 300 MILLIGRAM(S): 250 TABLET, FILM COATED ORAL at 14:52

## 2023-01-01 RX ADMIN — Medication 25 MILLIGRAM(S): at 11:18

## 2023-01-01 RX ADMIN — Medication 2 TABLET(S): at 21:39

## 2023-01-01 RX ADMIN — Medication 1 MILLIGRAM(S): at 12:34

## 2023-01-01 RX ADMIN — Medication 10 MILLIGRAM(S): at 01:31

## 2023-01-01 RX ADMIN — Medication 25 MILLIGRAM(S): at 09:32

## 2023-01-01 RX ADMIN — I.V. FAT EMULSION 20.83 GM/KG/DAY: 20 EMULSION INTRAVENOUS at 17:32

## 2023-01-01 RX ADMIN — CHLORHEXIDINE GLUCONATE 1 APPLICATION(S): 213 SOLUTION TOPICAL at 05:27

## 2023-01-01 RX ADMIN — Medication 2 TABLET(S): at 12:31

## 2023-01-01 RX ADMIN — URSODIOL 300 MILLIGRAM(S): 250 TABLET, FILM COATED ORAL at 07:32

## 2023-01-01 RX ADMIN — OCTREOTIDE ACETATE 100 MICROGRAM(S): 200 INJECTION, SOLUTION INTRAVENOUS; SUBCUTANEOUS at 00:29

## 2023-01-01 RX ADMIN — URSODIOL 300 MILLIGRAM(S): 250 TABLET, FILM COATED ORAL at 21:51

## 2023-01-01 RX ADMIN — URSODIOL 300 MILLIGRAM(S): 250 TABLET, FILM COATED ORAL at 21:33

## 2023-01-01 RX ADMIN — CHLORHEXIDINE GLUCONATE 1 APPLICATION(S): 213 SOLUTION TOPICAL at 05:43

## 2023-01-01 RX ADMIN — HEPARIN SODIUM 5000 UNIT(S): 5000 INJECTION INTRAVENOUS; SUBCUTANEOUS at 14:53

## 2023-01-01 RX ADMIN — Medication 300 MILLIGRAM(S): at 09:32

## 2023-01-01 RX ADMIN — Medication 5 MILLIGRAM(S): at 17:16

## 2023-01-01 RX ADMIN — Medication 1 SPRAY(S): at 06:29

## 2023-01-01 RX ADMIN — Medication 150 MILLIGRAM(S): at 06:43

## 2023-01-01 RX ADMIN — Medication 5 MILLIGRAM(S): at 21:36

## 2023-01-01 RX ADMIN — Medication 100 MILLIGRAM(S): at 11:02

## 2023-01-01 RX ADMIN — CHOLESTYRAMINE 4 GRAM(S): 4 POWDER, FOR SUSPENSION ORAL at 16:27

## 2023-01-01 RX ADMIN — AMIODARONE HYDROCHLORIDE 200 MILLIGRAM(S): 400 TABLET ORAL at 05:40

## 2023-01-01 RX ADMIN — Medication 4 MILLIGRAM(S): at 11:32

## 2023-01-01 RX ADMIN — Medication 100 MILLIEQUIVALENT(S): at 15:50

## 2023-01-01 RX ADMIN — Medication 50 MILLIEQUIVALENT(S): at 13:47

## 2023-01-01 RX ADMIN — Medication 10 MILLIGRAM(S): at 03:42

## 2023-01-01 RX ADMIN — MORPHINE 6 MILLIGRAM(S): 10 SOLUTION ORAL at 07:58

## 2023-01-01 RX ADMIN — AMIODARONE HYDROCHLORIDE 16.7 MG/MIN: 400 TABLET ORAL at 18:44

## 2023-01-01 RX ADMIN — INSULIN HUMAN 10 UNIT(S): 100 INJECTION, SOLUTION SUBCUTANEOUS at 00:02

## 2023-01-01 RX ADMIN — Medication 50 MILLIEQUIVALENT(S): at 09:18

## 2023-01-01 RX ADMIN — IMIPENEM AND CILASTATIN 250 MILLIGRAM(S): 250; 250 INJECTION, POWDER, FOR SOLUTION INTRAVENOUS at 10:09

## 2023-01-01 RX ADMIN — OCTREOTIDE ACETATE 100 MICROGRAM(S): 200 INJECTION, SOLUTION INTRAVENOUS; SUBCUTANEOUS at 10:44

## 2023-01-01 RX ADMIN — HEPARIN SODIUM 5000 UNIT(S): 5000 INJECTION INTRAVENOUS; SUBCUTANEOUS at 22:12

## 2023-01-01 RX ADMIN — MORPHINE 6 MILLIGRAM(S): 10 SOLUTION ORAL at 06:34

## 2023-01-01 RX ADMIN — HEPARIN SODIUM 5000 UNIT(S): 5000 INJECTION INTRAVENOUS; SUBCUTANEOUS at 21:48

## 2023-01-01 RX ADMIN — Medication 10 MILLIGRAM(S): at 05:20

## 2023-01-01 RX ADMIN — Medication 1 SPRAY(S): at 07:15

## 2023-01-01 RX ADMIN — HEPARIN SODIUM 5000 UNIT(S): 5000 INJECTION INTRAVENOUS; SUBCUTANEOUS at 05:20

## 2023-01-01 RX ADMIN — Medication 0.5 MILLIGRAM(S): at 23:06

## 2023-01-01 RX ADMIN — Medication 100 MILLIGRAM(S): at 23:25

## 2023-01-01 RX ADMIN — HEPARIN SODIUM 5000 UNIT(S): 5000 INJECTION INTRAVENOUS; SUBCUTANEOUS at 22:53

## 2023-01-01 RX ADMIN — HYDROMORPHONE HYDROCHLORIDE 0.5 MILLIGRAM(S): 2 INJECTION INTRAMUSCULAR; INTRAVENOUS; SUBCUTANEOUS at 11:37

## 2023-01-01 RX ADMIN — SODIUM CHLORIDE 1000 MILLILITER(S): 9 INJECTION INTRAMUSCULAR; INTRAVENOUS; SUBCUTANEOUS at 11:00

## 2023-01-01 RX ADMIN — IMIPENEM AND CILASTATIN 100 MILLIGRAM(S): 250; 250 INJECTION, POWDER, FOR SOLUTION INTRAVENOUS at 09:46

## 2023-01-01 RX ADMIN — Medication 83 EACH: at 18:00

## 2023-01-01 RX ADMIN — HEPARIN SODIUM 5000 UNIT(S): 5000 INJECTION INTRAVENOUS; SUBCUTANEOUS at 14:49

## 2023-01-01 RX ADMIN — Medication 10 MILLIGRAM(S): at 07:09

## 2023-01-01 RX ADMIN — CHOLESTYRAMINE 4 GRAM(S): 4 POWDER, FOR SUSPENSION ORAL at 09:18

## 2023-01-01 RX ADMIN — Medication 30 MICROGRAM(S): at 23:06

## 2023-01-01 RX ADMIN — Medication 2 TABLET(S): at 05:05

## 2023-01-01 RX ADMIN — HYDROMORPHONE HYDROCHLORIDE 0.5 MILLIGRAM(S): 2 INJECTION INTRAMUSCULAR; INTRAVENOUS; SUBCUTANEOUS at 08:05

## 2023-01-01 RX ADMIN — Medication 40 MICROGRAM(S): at 07:12

## 2023-01-01 RX ADMIN — Medication 40 MICROGRAM(S): at 22:52

## 2023-01-01 RX ADMIN — Medication 300 MILLIGRAM(S): at 10:41

## 2023-01-01 RX ADMIN — ATORVASTATIN CALCIUM 20 MILLIGRAM(S): 80 TABLET, FILM COATED ORAL at 21:43

## 2023-01-01 RX ADMIN — ONDANSETRON 4 MILLIGRAM(S): 8 TABLET, FILM COATED ORAL at 03:26

## 2023-01-01 RX ADMIN — Medication 5 MILLIGRAM(S): at 11:27

## 2023-01-01 RX ADMIN — Medication 2 TABLET(S): at 11:32

## 2023-01-01 RX ADMIN — OCTREOTIDE ACETATE 100 MICROGRAM(S): 200 INJECTION, SOLUTION INTRAVENOUS; SUBCUTANEOUS at 00:07

## 2023-01-01 RX ADMIN — I.V. FAT EMULSION 20.83 GM/KG/DAY: 20 EMULSION INTRAVENOUS at 17:18

## 2023-01-01 RX ADMIN — Medication 150 MILLIGRAM(S): at 19:10

## 2023-01-01 RX ADMIN — Medication 20 MILLIGRAM(S): at 01:34

## 2023-01-01 RX ADMIN — Medication 40 MILLIGRAM(S): at 09:05

## 2023-01-01 RX ADMIN — Medication 150 MILLIGRAM(S): at 06:26

## 2023-01-01 RX ADMIN — OCTREOTIDE ACETATE 100 MICROGRAM(S): 200 INJECTION, SOLUTION INTRAVENOUS; SUBCUTANEOUS at 18:14

## 2023-01-01 RX ADMIN — Medication 4 MILLIGRAM(S): at 06:32

## 2023-01-01 RX ADMIN — NYSTATIN CREAM 1 APPLICATION(S): 100000 CREAM TOPICAL at 05:44

## 2023-01-01 RX ADMIN — Medication 300 MILLIGRAM(S): at 10:18

## 2023-01-01 RX ADMIN — CHOLESTYRAMINE 4 GRAM(S): 4 POWDER, FOR SUSPENSION ORAL at 09:10

## 2023-01-01 RX ADMIN — DIATRIZOATE MEGLUMINE 30 MILLILITER(S): 180 INJECTION, SOLUTION INTRAVESICAL at 06:31

## 2023-01-01 RX ADMIN — Medication 25 MILLIGRAM(S): at 06:18

## 2023-01-01 RX ADMIN — HYDROMORPHONE HYDROCHLORIDE 0.5 MILLIGRAM(S): 2 INJECTION INTRAMUSCULAR; INTRAVENOUS; SUBCUTANEOUS at 10:04

## 2023-01-01 RX ADMIN — SODIUM CHLORIDE 1000 MILLILITER(S): 9 INJECTION, SOLUTION INTRAVENOUS at 05:53

## 2023-01-01 RX ADMIN — CHOLESTYRAMINE 4 GRAM(S): 4 POWDER, FOR SUSPENSION ORAL at 17:08

## 2023-01-01 RX ADMIN — Medication 40 MICROGRAM(S): at 22:26

## 2023-01-01 RX ADMIN — URSODIOL 300 MILLIGRAM(S): 250 TABLET, FILM COATED ORAL at 21:28

## 2023-01-01 RX ADMIN — Medication 5 MILLIGRAM(S): at 03:50

## 2023-01-01 RX ADMIN — HEPARIN SODIUM 5000 UNIT(S): 5000 INJECTION INTRAVENOUS; SUBCUTANEOUS at 14:07

## 2023-01-01 RX ADMIN — AMIODARONE HYDROCHLORIDE 200 MILLIGRAM(S): 400 TABLET ORAL at 08:18

## 2023-01-01 RX ADMIN — HEPARIN SODIUM 5000 UNIT(S): 5000 INJECTION INTRAVENOUS; SUBCUTANEOUS at 14:45

## 2023-01-01 RX ADMIN — NYSTATIN CREAM 1 APPLICATION(S): 100000 CREAM TOPICAL at 22:27

## 2023-01-01 RX ADMIN — SODIUM CHLORIDE 130 MILLILITER(S): 9 INJECTION, SOLUTION INTRAVENOUS at 00:25

## 2023-01-01 RX ADMIN — HEPARIN SODIUM 5000 UNIT(S): 5000 INJECTION INTRAVENOUS; SUBCUTANEOUS at 13:04

## 2023-01-01 RX ADMIN — Medication 4 MILLIGRAM(S): at 18:44

## 2023-01-01 RX ADMIN — Medication 4 MILLIGRAM(S): at 05:57

## 2023-01-01 RX ADMIN — Medication 150 MILLIGRAM(S): at 06:49

## 2023-01-01 RX ADMIN — Medication 2 TABLET(S): at 12:36

## 2023-01-01 RX ADMIN — Medication 5 MILLIGRAM(S): at 23:09

## 2023-01-01 RX ADMIN — AMLODIPINE BESYLATE 10 MILLIGRAM(S): 2.5 TABLET ORAL at 05:47

## 2023-01-01 RX ADMIN — Medication 2 TABLET(S): at 19:30

## 2023-01-01 RX ADMIN — URSODIOL 300 MILLIGRAM(S): 250 TABLET, FILM COATED ORAL at 22:54

## 2023-01-01 RX ADMIN — NYSTATIN CREAM 1 APPLICATION(S): 100000 CREAM TOPICAL at 13:42

## 2023-01-01 RX ADMIN — SODIUM CHLORIDE 500 MILLILITER(S): 9 INJECTION, SOLUTION INTRAVENOUS at 06:16

## 2023-01-01 RX ADMIN — AMIODARONE HYDROCHLORIDE 200 MILLIGRAM(S): 400 TABLET ORAL at 06:14

## 2023-01-01 RX ADMIN — Medication 4 MILLIGRAM(S): at 23:19

## 2023-01-01 RX ADMIN — PROPOFOL 6.8 MICROGRAM(S)/KG/MIN: 10 INJECTION, EMULSION INTRAVENOUS at 02:59

## 2023-01-01 RX ADMIN — I.V. FAT EMULSION 20.83 GM/KG/DAY: 20 EMULSION INTRAVENOUS at 18:28

## 2023-01-01 RX ADMIN — Medication 25 MILLIGRAM(S): at 18:08

## 2023-01-01 RX ADMIN — LOSARTAN POTASSIUM 25 MILLIGRAM(S): 100 TABLET, FILM COATED ORAL at 05:42

## 2023-01-01 RX ADMIN — URSODIOL 300 MILLIGRAM(S): 250 TABLET, FILM COATED ORAL at 06:18

## 2023-01-01 RX ADMIN — NYSTATIN CREAM 1 APPLICATION(S): 100000 CREAM TOPICAL at 14:52

## 2023-01-01 RX ADMIN — ONDANSETRON 4 MILLIGRAM(S): 8 TABLET, FILM COATED ORAL at 08:23

## 2023-01-01 RX ADMIN — Medication 250 MILLIGRAM(S): at 06:28

## 2023-01-01 RX ADMIN — MORPHINE 6 MILLIGRAM(S): 10 SOLUTION ORAL at 06:00

## 2023-01-01 RX ADMIN — Medication 4 MILLIGRAM(S): at 06:35

## 2023-01-01 RX ADMIN — Medication 10 MILLIGRAM(S): at 06:23

## 2023-01-01 RX ADMIN — HEPARIN SODIUM 5000 UNIT(S): 5000 INJECTION INTRAVENOUS; SUBCUTANEOUS at 22:00

## 2023-01-01 RX ADMIN — Medication 1 EACH: at 18:46

## 2023-01-01 RX ADMIN — MORPHINE 2 MILLIGRAM(S): 10 SOLUTION ORAL at 18:36

## 2023-01-01 RX ADMIN — CHOLESTYRAMINE 4 GRAM(S): 4 POWDER, FOR SUSPENSION ORAL at 17:14

## 2023-01-01 RX ADMIN — OCTREOTIDE ACETATE 100 MICROGRAM(S): 200 INJECTION, SOLUTION INTRAVENOUS; SUBCUTANEOUS at 01:34

## 2023-01-01 RX ADMIN — HEPARIN SODIUM 5000 UNIT(S): 5000 INJECTION INTRAVENOUS; SUBCUTANEOUS at 23:57

## 2023-01-01 RX ADMIN — Medication 0.5 MILLIGRAM(S): at 18:26

## 2023-01-01 RX ADMIN — IMIPENEM AND CILASTATIN 250 MILLIGRAM(S): 250; 250 INJECTION, POWDER, FOR SOLUTION INTRAVENOUS at 01:10

## 2023-01-01 RX ADMIN — Medication 65 MILLILITER(S): at 06:25

## 2023-01-01 RX ADMIN — SODIUM CHLORIDE 1000 MILLILITER(S): 9 INJECTION, SOLUTION INTRAVENOUS at 09:35

## 2023-01-01 RX ADMIN — ENOXAPARIN SODIUM 100 MILLIGRAM(S): 100 INJECTION SUBCUTANEOUS at 06:41

## 2023-01-01 RX ADMIN — SODIUM CHLORIDE 1500 MILLILITER(S): 9 INJECTION INTRAMUSCULAR; INTRAVENOUS; SUBCUTANEOUS at 09:53

## 2023-01-01 RX ADMIN — LOSARTAN POTASSIUM 25 MILLIGRAM(S): 100 TABLET, FILM COATED ORAL at 05:30

## 2023-01-01 RX ADMIN — NYSTATIN CREAM 1 APPLICATION(S): 100000 CREAM TOPICAL at 13:51

## 2023-01-01 RX ADMIN — Medication 150 MILLIGRAM(S): at 11:08

## 2023-01-01 RX ADMIN — Medication 10 MILLIGRAM(S): at 01:00

## 2023-01-01 RX ADMIN — Medication 5 MILLIGRAM(S): at 16:29

## 2023-01-01 RX ADMIN — Medication 400 MILLIGRAM(S): at 12:44

## 2023-01-01 RX ADMIN — CHLORHEXIDINE GLUCONATE 15 MILLILITER(S): 213 SOLUTION TOPICAL at 17:36

## 2023-01-01 RX ADMIN — CHOLESTYRAMINE 4 GRAM(S): 4 POWDER, FOR SUSPENSION ORAL at 16:51

## 2023-01-01 RX ADMIN — I.V. FAT EMULSION 20.8 GM/KG/DAY: 20 EMULSION INTRAVENOUS at 17:58

## 2023-01-01 RX ADMIN — Medication 2 TABLET(S): at 12:47

## 2023-01-01 RX ADMIN — BENZOCAINE AND MENTHOL 2 LOZENGE: 5; 1 LIQUID ORAL at 23:29

## 2023-01-01 RX ADMIN — NYSTATIN CREAM 1 APPLICATION(S): 100000 CREAM TOPICAL at 05:21

## 2023-01-01 RX ADMIN — Medication 100 MILLIEQUIVALENT(S): at 10:42

## 2023-01-01 RX ADMIN — HYDROMORPHONE HYDROCHLORIDE 0.5 MILLIGRAM(S): 2 INJECTION INTRAMUSCULAR; INTRAVENOUS; SUBCUTANEOUS at 15:09

## 2023-01-01 RX ADMIN — FAMOTIDINE 20 MILLIGRAM(S): 10 INJECTION INTRAVENOUS at 12:27

## 2023-01-01 RX ADMIN — Medication 4 MILLIGRAM(S): at 18:46

## 2023-01-01 RX ADMIN — Medication 2 TABLET(S): at 06:00

## 2023-01-01 RX ADMIN — ENOXAPARIN SODIUM 100 MILLIGRAM(S): 100 INJECTION SUBCUTANEOUS at 17:34

## 2023-01-01 RX ADMIN — Medication 300 MILLIGRAM(S): at 11:33

## 2023-01-01 RX ADMIN — Medication 5 MILLIGRAM(S): at 22:36

## 2023-01-01 RX ADMIN — HEPARIN SODIUM 5000 UNIT(S): 5000 INJECTION INTRAVENOUS; SUBCUTANEOUS at 05:05

## 2023-01-01 RX ADMIN — Medication 2 TABLET(S): at 06:20

## 2023-01-01 RX ADMIN — CHLORHEXIDINE GLUCONATE 1 APPLICATION(S): 213 SOLUTION TOPICAL at 16:34

## 2023-01-01 RX ADMIN — OCTREOTIDE ACETATE 100 MICROGRAM(S): 200 INJECTION, SOLUTION INTRAVENOUS; SUBCUTANEOUS at 11:18

## 2023-01-01 RX ADMIN — CHLORHEXIDINE GLUCONATE 1 APPLICATION(S): 213 SOLUTION TOPICAL at 13:02

## 2023-01-01 RX ADMIN — I.V. FAT EMULSION 20.83 GM/KG/DAY: 20 EMULSION INTRAVENOUS at 18:23

## 2023-01-01 RX ADMIN — Medication 40 MILLIGRAM(S): at 13:00

## 2023-01-01 RX ADMIN — Medication 4 MILLIGRAM(S): at 17:49

## 2023-01-01 RX ADMIN — Medication 65 MILLILITER(S): at 01:00

## 2023-01-01 RX ADMIN — I.V. FAT EMULSION 41.67 GM/KG/DAY: 20 EMULSION INTRAVENOUS at 17:26

## 2023-01-01 RX ADMIN — Medication 2 TABLET(S): at 06:35

## 2023-01-01 RX ADMIN — LOSARTAN POTASSIUM 25 MILLIGRAM(S): 100 TABLET, FILM COATED ORAL at 05:24

## 2023-01-01 RX ADMIN — Medication 4 MILLIGRAM(S): at 07:57

## 2023-01-01 RX ADMIN — LOSARTAN POTASSIUM 25 MILLIGRAM(S): 100 TABLET, FILM COATED ORAL at 05:00

## 2023-01-01 RX ADMIN — HEPARIN SODIUM 5000 UNIT(S): 5000 INJECTION INTRAVENOUS; SUBCUTANEOUS at 14:44

## 2023-01-01 RX ADMIN — OCTREOTIDE ACETATE 100 MICROGRAM(S): 200 INJECTION, SOLUTION INTRAVENOUS; SUBCUTANEOUS at 01:14

## 2023-01-01 RX ADMIN — SODIUM CHLORIDE 1000 MILLILITER(S): 9 INJECTION INTRAMUSCULAR; INTRAVENOUS; SUBCUTANEOUS at 17:50

## 2023-01-01 RX ADMIN — Medication 25 MILLIGRAM(S): at 20:33

## 2023-01-01 RX ADMIN — Medication 1000 MILLIGRAM(S): at 11:15

## 2023-01-01 RX ADMIN — SODIUM CHLORIDE 2000 MILLILITER(S): 9 INJECTION INTRAMUSCULAR; INTRAVENOUS; SUBCUTANEOUS at 09:58

## 2023-01-01 RX ADMIN — Medication 0.25 MILLIGRAM(S): at 22:26

## 2023-01-01 RX ADMIN — Medication 0.5 MILLIGRAM(S): at 22:54

## 2023-01-01 RX ADMIN — HYDROMORPHONE HYDROCHLORIDE 0.5 MILLIGRAM(S): 2 INJECTION INTRAMUSCULAR; INTRAVENOUS; SUBCUTANEOUS at 06:42

## 2023-01-01 RX ADMIN — CHLORHEXIDINE GLUCONATE 15 MILLILITER(S): 213 SOLUTION TOPICAL at 06:47

## 2023-01-01 RX ADMIN — SODIUM CHLORIDE 1000 MILLILITER(S): 9 INJECTION INTRAMUSCULAR; INTRAVENOUS; SUBCUTANEOUS at 12:15

## 2023-01-01 RX ADMIN — PANTOPRAZOLE SODIUM 40 MILLIGRAM(S): 20 TABLET, DELAYED RELEASE ORAL at 10:34

## 2023-01-01 RX ADMIN — Medication 250 MILLIGRAM(S): at 03:01

## 2023-01-01 RX ADMIN — Medication 25 MILLIGRAM(S): at 14:08

## 2023-01-01 RX ADMIN — Medication 5 MILLIGRAM(S): at 17:00

## 2023-01-01 RX ADMIN — HEPARIN SODIUM 5000 UNIT(S): 5000 INJECTION INTRAVENOUS; SUBCUTANEOUS at 21:45

## 2023-01-01 RX ADMIN — Medication 50 MILLILITER(S): at 18:00

## 2023-01-01 RX ADMIN — SCOPALAMINE 1 PATCH: 1 PATCH, EXTENDED RELEASE TRANSDERMAL at 19:08

## 2023-01-01 RX ADMIN — Medication 5 MILLIGRAM(S): at 04:46

## 2023-01-01 RX ADMIN — SODIUM CHLORIDE 110 MILLILITER(S): 9 INJECTION, SOLUTION INTRAVENOUS at 07:07

## 2023-01-01 RX ADMIN — ATORVASTATIN CALCIUM 20 MILLIGRAM(S): 80 TABLET, FILM COATED ORAL at 22:01

## 2023-01-01 RX ADMIN — URSODIOL 300 MILLIGRAM(S): 250 TABLET, FILM COATED ORAL at 06:05

## 2023-01-01 RX ADMIN — OCTREOTIDE ACETATE 100 MICROGRAM(S): 200 INJECTION, SOLUTION INTRAVENOUS; SUBCUTANEOUS at 11:27

## 2023-01-01 RX ADMIN — Medication 3 MILLILITER(S): at 11:22

## 2023-01-01 RX ADMIN — Medication 5 MILLIGRAM(S): at 02:26

## 2023-01-01 RX ADMIN — Medication 10 MILLIGRAM(S): at 05:13

## 2023-01-01 RX ADMIN — ONDANSETRON 4 MILLIGRAM(S): 8 TABLET, FILM COATED ORAL at 09:33

## 2023-01-01 RX ADMIN — Medication 50 MILLIGRAM(S): at 08:29

## 2023-01-01 RX ADMIN — Medication 2 TABLET(S): at 00:29

## 2023-01-01 RX ADMIN — Medication 25 MILLIGRAM(S): at 05:38

## 2023-01-01 RX ADMIN — MORPHINE 6 MILLIGRAM(S): 10 SOLUTION ORAL at 18:17

## 2023-01-01 RX ADMIN — MORPHINE 6 MILLIGRAM(S): 10 SOLUTION ORAL at 18:09

## 2023-01-01 RX ADMIN — SODIUM CHLORIDE 120 MILLILITER(S): 9 INJECTION, SOLUTION INTRAVENOUS at 14:39

## 2023-01-01 RX ADMIN — HEPARIN SODIUM 5000 UNIT(S): 5000 INJECTION INTRAVENOUS; SUBCUTANEOUS at 06:16

## 2023-01-01 RX ADMIN — OCTREOTIDE ACETATE 100 MICROGRAM(S): 200 INJECTION, SOLUTION INTRAVENOUS; SUBCUTANEOUS at 10:56

## 2023-01-01 RX ADMIN — SODIUM CHLORIDE 40 MILLILITER(S): 9 INJECTION INTRAMUSCULAR; INTRAVENOUS; SUBCUTANEOUS at 13:15

## 2023-01-01 RX ADMIN — Medication 40 MICROGRAM(S): at 22:50

## 2023-01-01 RX ADMIN — Medication 10 MILLIGRAM(S): at 18:59

## 2023-01-01 RX ADMIN — Medication 4 MILLIGRAM(S): at 11:30

## 2023-01-01 RX ADMIN — CHOLESTYRAMINE 4 GRAM(S): 4 POWDER, FOR SUSPENSION ORAL at 15:20

## 2023-01-01 RX ADMIN — CHOLESTYRAMINE 4 GRAM(S): 4 POWDER, FOR SUSPENSION ORAL at 21:59

## 2023-01-01 RX ADMIN — URSODIOL 300 MILLIGRAM(S): 250 TABLET, FILM COATED ORAL at 22:01

## 2023-01-01 RX ADMIN — OCTREOTIDE ACETATE 100 MICROGRAM(S): 200 INJECTION, SOLUTION INTRAVENOUS; SUBCUTANEOUS at 02:21

## 2023-01-01 RX ADMIN — Medication 4 MILLIGRAM(S): at 23:45

## 2023-01-01 RX ADMIN — PANTOPRAZOLE SODIUM 40 MILLIGRAM(S): 20 TABLET, DELAYED RELEASE ORAL at 09:51

## 2023-01-01 RX ADMIN — SODIUM CHLORIDE 100 MILLILITER(S): 9 INJECTION, SOLUTION INTRAVENOUS at 08:46

## 2023-01-01 RX ADMIN — AMIODARONE HYDROCHLORIDE 200 MILLIGRAM(S): 400 TABLET ORAL at 06:12

## 2023-01-01 RX ADMIN — CHLORHEXIDINE GLUCONATE 1 APPLICATION(S): 213 SOLUTION TOPICAL at 05:29

## 2023-01-01 RX ADMIN — HEPARIN SODIUM 5000 UNIT(S): 5000 INJECTION INTRAVENOUS; SUBCUTANEOUS at 22:01

## 2023-01-01 RX ADMIN — Medication 4 MILLIGRAM(S): at 13:55

## 2023-01-01 RX ADMIN — PANTOPRAZOLE SODIUM 40 MILLIGRAM(S): 20 TABLET, DELAYED RELEASE ORAL at 06:26

## 2023-01-01 RX ADMIN — CHOLESTYRAMINE 4 GRAM(S): 4 POWDER, FOR SUSPENSION ORAL at 22:33

## 2023-01-01 RX ADMIN — Medication 10 MILLIGRAM(S): at 03:53

## 2023-01-01 RX ADMIN — Medication 50 MILLILITER(S): at 16:00

## 2023-01-01 RX ADMIN — MORPHINE 6 MILLIGRAM(S): 10 SOLUTION ORAL at 07:42

## 2023-01-01 RX ADMIN — Medication 4 MILLIGRAM(S): at 18:14

## 2023-01-01 RX ADMIN — BENZOCAINE AND MENTHOL 1 LOZENGE: 5; 1 LIQUID ORAL at 10:00

## 2023-01-01 RX ADMIN — Medication 25 MILLIGRAM(S): at 22:30

## 2023-01-01 RX ADMIN — Medication 10 MILLIGRAM(S): at 05:22

## 2023-01-01 RX ADMIN — HEPARIN SODIUM 5000 UNIT(S): 5000 INJECTION INTRAVENOUS; SUBCUTANEOUS at 06:30

## 2023-01-01 RX ADMIN — Medication 4 MILLIGRAM(S): at 08:13

## 2023-01-01 RX ADMIN — NYSTATIN CREAM 1 APPLICATION(S): 100000 CREAM TOPICAL at 14:35

## 2023-01-01 RX ADMIN — Medication 2 TABLET(S): at 09:46

## 2023-01-01 RX ADMIN — MORPHINE 6 MILLIGRAM(S): 10 SOLUTION ORAL at 12:01

## 2023-01-01 RX ADMIN — Medication 25 MILLIGRAM(S): at 21:19

## 2023-01-01 RX ADMIN — AMIODARONE HYDROCHLORIDE 200 MILLIGRAM(S): 400 TABLET ORAL at 06:16

## 2023-01-01 RX ADMIN — SODIUM CHLORIDE 1000 MILLILITER(S): 9 INJECTION, SOLUTION INTRAVENOUS at 06:15

## 2023-01-01 RX ADMIN — OCTREOTIDE ACETATE 100 MICROGRAM(S): 200 INJECTION, SOLUTION INTRAVENOUS; SUBCUTANEOUS at 03:11

## 2023-01-01 RX ADMIN — Medication 100 MILLIGRAM(S): at 14:41

## 2023-01-01 RX ADMIN — Medication 40 MILLIEQUIVALENT(S): at 09:06

## 2023-01-01 RX ADMIN — IMIPENEM AND CILASTATIN 100 MILLIGRAM(S): 250; 250 INJECTION, POWDER, FOR SOLUTION INTRAVENOUS at 09:12

## 2023-01-01 RX ADMIN — AMLODIPINE BESYLATE 10 MILLIGRAM(S): 2.5 TABLET ORAL at 05:32

## 2023-01-01 RX ADMIN — CHOLESTYRAMINE 4 GRAM(S): 4 POWDER, FOR SUSPENSION ORAL at 10:01

## 2023-01-01 RX ADMIN — AMIODARONE HYDROCHLORIDE 16.7 MG/MIN: 400 TABLET ORAL at 23:00

## 2023-01-01 RX ADMIN — Medication 5 MILLIGRAM(S): at 12:26

## 2023-01-01 RX ADMIN — Medication 5 MILLIGRAM(S): at 10:12

## 2023-01-01 RX ADMIN — Medication 100 MILLIGRAM(S): at 14:17

## 2023-01-01 RX ADMIN — Medication 5 MILLIGRAM(S): at 05:49

## 2023-01-01 RX ADMIN — Medication 25 MILLIGRAM(S): at 11:27

## 2023-01-01 RX ADMIN — Medication 150 MILLIGRAM(S): at 05:34

## 2023-01-01 RX ADMIN — Medication 400 MILLIGRAM(S): at 17:19

## 2023-01-01 RX ADMIN — Medication 25 MILLIGRAM(S): at 05:53

## 2023-01-01 RX ADMIN — CHLORHEXIDINE GLUCONATE 1 APPLICATION(S): 213 SOLUTION TOPICAL at 06:33

## 2023-01-01 RX ADMIN — HEPARIN SODIUM 5000 UNIT(S): 5000 INJECTION INTRAVENOUS; SUBCUTANEOUS at 13:21

## 2023-01-01 RX ADMIN — CHOLESTYRAMINE 4 GRAM(S): 4 POWDER, FOR SUSPENSION ORAL at 09:12

## 2023-01-01 RX ADMIN — FAMOTIDINE 20 MILLIGRAM(S): 10 INJECTION INTRAVENOUS at 19:45

## 2023-01-01 RX ADMIN — Medication 4 MILLIGRAM(S): at 09:51

## 2023-01-01 RX ADMIN — LOSARTAN POTASSIUM 25 MILLIGRAM(S): 100 TABLET, FILM COATED ORAL at 05:27

## 2023-01-01 RX ADMIN — HEPARIN SODIUM 5000 UNIT(S): 5000 INJECTION INTRAVENOUS; SUBCUTANEOUS at 21:14

## 2023-01-01 RX ADMIN — Medication 2 TABLET(S): at 05:03

## 2023-01-01 RX ADMIN — CHOLESTYRAMINE 4 GRAM(S): 4 POWDER, FOR SUSPENSION ORAL at 13:22

## 2023-01-01 RX ADMIN — OCTREOTIDE ACETATE 100 MICROGRAM(S): 200 INJECTION, SOLUTION INTRAVENOUS; SUBCUTANEOUS at 18:08

## 2023-01-01 RX ADMIN — CHLORHEXIDINE GLUCONATE 1 APPLICATION(S): 213 SOLUTION TOPICAL at 06:27

## 2023-01-01 RX ADMIN — Medication 25 GRAM(S): at 10:30

## 2023-01-01 RX ADMIN — Medication 5 MILLIGRAM(S): at 11:42

## 2023-01-01 RX ADMIN — I.V. FAT EMULSION 20.83 GM/KG/DAY: 20 EMULSION INTRAVENOUS at 18:00

## 2023-01-01 RX ADMIN — HEPARIN SODIUM 5000 UNIT(S): 5000 INJECTION INTRAVENOUS; SUBCUTANEOUS at 18:33

## 2023-01-01 RX ADMIN — BENZOCAINE AND MENTHOL 1 LOZENGE: 5; 1 LIQUID ORAL at 01:08

## 2023-01-01 RX ADMIN — CHLORHEXIDINE GLUCONATE 15 MILLILITER(S): 213 SOLUTION TOPICAL at 17:04

## 2023-01-01 RX ADMIN — HEPARIN SODIUM 5000 UNIT(S): 5000 INJECTION INTRAVENOUS; SUBCUTANEOUS at 07:25

## 2023-01-01 RX ADMIN — Medication 25 MILLIGRAM(S): at 06:12

## 2023-01-01 RX ADMIN — MORPHINE 2 MILLIGRAM(S): 10 SOLUTION ORAL at 06:25

## 2023-01-01 RX ADMIN — CEFTRIAXONE 100 MILLIGRAM(S): 500 INJECTION, POWDER, FOR SOLUTION INTRAMUSCULAR; INTRAVENOUS at 22:36

## 2023-01-01 RX ADMIN — HYDROMORPHONE HYDROCHLORIDE 0.5 MILLIGRAM(S): 2 INJECTION INTRAMUSCULAR; INTRAVENOUS; SUBCUTANEOUS at 13:00

## 2023-01-01 RX ADMIN — CHLORHEXIDINE GLUCONATE 1 APPLICATION(S): 213 SOLUTION TOPICAL at 06:56

## 2023-01-01 RX ADMIN — Medication 2: at 12:22

## 2023-01-01 RX ADMIN — AMLODIPINE BESYLATE 5 MILLIGRAM(S): 2.5 TABLET ORAL at 05:47

## 2023-01-01 RX ADMIN — CHOLESTYRAMINE 4 GRAM(S): 4 POWDER, FOR SUSPENSION ORAL at 23:21

## 2023-01-01 RX ADMIN — OCTREOTIDE ACETATE 100 MICROGRAM(S): 200 INJECTION, SOLUTION INTRAVENOUS; SUBCUTANEOUS at 17:58

## 2023-01-01 RX ADMIN — Medication 150 MILLIGRAM(S): at 05:41

## 2023-01-01 RX ADMIN — OCTREOTIDE ACETATE 100 MICROGRAM(S): 200 INJECTION, SOLUTION INTRAVENOUS; SUBCUTANEOUS at 18:09

## 2023-01-01 RX ADMIN — Medication 4 MILLIGRAM(S): at 18:05

## 2023-01-01 RX ADMIN — FLUCONAZOLE 100 MILLIGRAM(S): 150 TABLET ORAL at 00:15

## 2023-01-01 RX ADMIN — OCTREOTIDE ACETATE 100 MICROGRAM(S): 200 INJECTION, SOLUTION INTRAVENOUS; SUBCUTANEOUS at 00:48

## 2023-01-01 RX ADMIN — DAPTOMYCIN 132 MILLIGRAM(S): 500 INJECTION, POWDER, LYOPHILIZED, FOR SOLUTION INTRAVENOUS at 16:22

## 2023-01-01 RX ADMIN — Medication 25 MILLIGRAM(S): at 21:27

## 2023-01-01 RX ADMIN — Medication 1 MILLIGRAM(S): at 11:26

## 2023-01-01 RX ADMIN — Medication 2 TABLET(S): at 21:54

## 2023-01-01 RX ADMIN — FENTANYL CITRATE 5.05 MICROGRAM(S)/KG/HR: 50 INJECTION INTRAVENOUS at 13:49

## 2023-01-01 RX ADMIN — PANTOPRAZOLE SODIUM 40 MILLIGRAM(S): 20 TABLET, DELAYED RELEASE ORAL at 11:53

## 2023-01-01 RX ADMIN — PANTOPRAZOLE SODIUM 40 MILLIGRAM(S): 20 TABLET, DELAYED RELEASE ORAL at 11:11

## 2023-01-01 RX ADMIN — MORPHINE 6 MILLIGRAM(S): 10 SOLUTION ORAL at 05:53

## 2023-01-01 RX ADMIN — CHLORHEXIDINE GLUCONATE 1 APPLICATION(S): 213 SOLUTION TOPICAL at 05:39

## 2023-01-01 RX ADMIN — SODIUM CHLORIDE 250 MILLILITER(S): 9 INJECTION, SOLUTION INTRAVENOUS at 04:33

## 2023-01-01 RX ADMIN — ENOXAPARIN SODIUM 100 MILLIGRAM(S): 100 INJECTION SUBCUTANEOUS at 06:33

## 2023-01-01 RX ADMIN — AMLODIPINE BESYLATE 10 MILLIGRAM(S): 2.5 TABLET ORAL at 05:14

## 2023-01-01 RX ADMIN — NYSTATIN CREAM 1 APPLICATION(S): 100000 CREAM TOPICAL at 21:33

## 2023-01-01 RX ADMIN — Medication 40 MICROGRAM(S): at 05:54

## 2023-01-01 RX ADMIN — Medication 4 MILLIGRAM(S): at 05:36

## 2023-01-01 RX ADMIN — Medication 100 MILLIEQUIVALENT(S): at 11:16

## 2023-01-01 RX ADMIN — Medication 5 MILLIGRAM(S): at 09:44

## 2023-01-01 RX ADMIN — PANTOPRAZOLE SODIUM 40 MILLIGRAM(S): 20 TABLET, DELAYED RELEASE ORAL at 10:23

## 2023-01-01 RX ADMIN — LOSARTAN POTASSIUM 25 MILLIGRAM(S): 100 TABLET, FILM COATED ORAL at 06:04

## 2023-01-01 RX ADMIN — MORPHINE 2 MILLIGRAM(S): 10 SOLUTION ORAL at 01:16

## 2023-01-01 RX ADMIN — Medication 150 MILLIGRAM(S): at 17:05

## 2023-01-01 RX ADMIN — OCTREOTIDE ACETATE 100 MICROGRAM(S): 200 INJECTION, SOLUTION INTRAVENOUS; SUBCUTANEOUS at 17:12

## 2023-01-01 RX ADMIN — MORPHINE 6 MILLIGRAM(S): 10 SOLUTION ORAL at 06:29

## 2023-01-01 RX ADMIN — Medication 50 MILLILITER(S): at 20:00

## 2023-01-01 RX ADMIN — AMIODARONE HYDROCHLORIDE 200 MILLIGRAM(S): 400 TABLET ORAL at 05:06

## 2023-01-01 RX ADMIN — Medication 5 MILLIGRAM(S): at 00:27

## 2023-01-01 RX ADMIN — Medication 10 MILLIGRAM(S): at 06:30

## 2023-01-01 RX ADMIN — Medication 40 MILLIGRAM(S): at 10:03

## 2023-01-01 RX ADMIN — SODIUM CHLORIDE 1000 MILLILITER(S): 9 INJECTION, SOLUTION INTRAVENOUS at 09:48

## 2023-01-01 RX ADMIN — Medication 40 MICROGRAM(S): at 22:25

## 2023-01-01 RX ADMIN — URSODIOL 300 MILLIGRAM(S): 250 TABLET, FILM COATED ORAL at 14:58

## 2023-01-01 RX ADMIN — Medication 5 MILLIGRAM(S): at 11:17

## 2023-01-01 RX ADMIN — HEPARIN SODIUM 5000 UNIT(S): 5000 INJECTION INTRAVENOUS; SUBCUTANEOUS at 14:36

## 2023-01-01 RX ADMIN — Medication 25 MILLIGRAM(S): at 09:36

## 2023-01-01 RX ADMIN — CHLORHEXIDINE GLUCONATE 15 MILLILITER(S): 213 SOLUTION TOPICAL at 17:40

## 2023-01-01 RX ADMIN — CHLORHEXIDINE GLUCONATE 1 APPLICATION(S): 213 SOLUTION TOPICAL at 06:22

## 2023-01-01 RX ADMIN — HEPARIN SODIUM 5000 UNIT(S): 5000 INJECTION INTRAVENOUS; SUBCUTANEOUS at 22:20

## 2023-01-01 RX ADMIN — OCTREOTIDE ACETATE 100 MICROGRAM(S): 200 INJECTION, SOLUTION INTRAVENOUS; SUBCUTANEOUS at 17:08

## 2023-01-01 RX ADMIN — I.V. FAT EMULSION 41.67 GM/KG/DAY: 20 EMULSION INTRAVENOUS at 18:35

## 2023-01-01 RX ADMIN — Medication 150 MILLIGRAM(S): at 05:44

## 2023-01-01 RX ADMIN — OCTREOTIDE ACETATE 100 MICROGRAM(S): 200 INJECTION, SOLUTION INTRAVENOUS; SUBCUTANEOUS at 23:48

## 2023-01-01 RX ADMIN — HEPARIN SODIUM 5000 UNIT(S): 5000 INJECTION INTRAVENOUS; SUBCUTANEOUS at 18:03

## 2023-01-01 RX ADMIN — MORPHINE 6 MILLIGRAM(S): 10 SOLUTION ORAL at 11:43

## 2023-01-01 RX ADMIN — SCOPALAMINE 1 PATCH: 1 PATCH, EXTENDED RELEASE TRANSDERMAL at 21:40

## 2023-01-01 RX ADMIN — Medication 10 MILLIGRAM(S): at 21:28

## 2023-01-01 RX ADMIN — URSODIOL 300 MILLIGRAM(S): 250 TABLET, FILM COATED ORAL at 22:58

## 2023-01-01 RX ADMIN — Medication 30 MICROGRAM(S): at 23:09

## 2023-01-01 RX ADMIN — SODIUM CHLORIDE 500 MILLILITER(S): 9 INJECTION, SOLUTION INTRAVENOUS at 03:17

## 2023-01-01 RX ADMIN — NYSTATIN CREAM 1 APPLICATION(S): 100000 CREAM TOPICAL at 05:49

## 2023-01-01 RX ADMIN — I.V. FAT EMULSION 42.1 GM/KG/DAY: 20 EMULSION INTRAVENOUS at 17:03

## 2023-01-01 RX ADMIN — URSODIOL 300 MILLIGRAM(S): 250 TABLET, FILM COATED ORAL at 06:53

## 2023-01-01 RX ADMIN — ATORVASTATIN CALCIUM 20 MILLIGRAM(S): 80 TABLET, FILM COATED ORAL at 21:31

## 2023-01-01 RX ADMIN — Medication 50 MILLILITER(S): at 15:29

## 2023-01-01 RX ADMIN — I.V. FAT EMULSION 20.83 GM/KG/DAY: 20 EMULSION INTRAVENOUS at 17:07

## 2023-01-01 RX ADMIN — Medication 25 MILLIGRAM(S): at 09:29

## 2023-01-01 RX ADMIN — MORPHINE 6 MILLIGRAM(S): 10 SOLUTION ORAL at 06:19

## 2023-01-01 RX ADMIN — Medication 25 MILLIGRAM(S): at 22:37

## 2023-01-01 RX ADMIN — Medication 50 MICROGRAM(S): at 05:10

## 2023-01-01 RX ADMIN — Medication 0.25 MILLIGRAM(S): at 16:43

## 2023-01-01 RX ADMIN — Medication 4 MILLIGRAM(S): at 11:14

## 2023-01-01 RX ADMIN — Medication 20 MILLIGRAM(S): at 06:51

## 2023-01-01 RX ADMIN — Medication 4 MILLIGRAM(S): at 17:57

## 2023-01-01 RX ADMIN — MORPHINE 6 MILLIGRAM(S): 10 SOLUTION ORAL at 05:18

## 2023-01-01 RX ADMIN — AMIODARONE HYDROCHLORIDE 200 MILLIGRAM(S): 400 TABLET ORAL at 07:08

## 2023-01-01 RX ADMIN — Medication 5 MILLIGRAM(S): at 18:46

## 2023-01-01 RX ADMIN — HYDROMORPHONE HYDROCHLORIDE 0.5 MILLIGRAM(S): 2 INJECTION INTRAMUSCULAR; INTRAVENOUS; SUBCUTANEOUS at 10:30

## 2023-01-01 RX ADMIN — Medication 100 MILLIGRAM(S): at 11:39

## 2023-01-01 RX ADMIN — CHLORHEXIDINE GLUCONATE 1 APPLICATION(S): 213 SOLUTION TOPICAL at 06:47

## 2023-01-01 RX ADMIN — Medication 10 MILLIGRAM(S): at 10:43

## 2023-01-01 RX ADMIN — Medication 0.5 MILLIGRAM(S): at 23:53

## 2023-01-01 RX ADMIN — AMIODARONE HYDROCHLORIDE 16.7 MG/MIN: 400 TABLET ORAL at 08:48

## 2023-01-01 RX ADMIN — URSODIOL 300 MILLIGRAM(S): 250 TABLET, FILM COATED ORAL at 21:57

## 2023-01-01 RX ADMIN — NYSTATIN CREAM 1 APPLICATION(S): 100000 CREAM TOPICAL at 22:37

## 2023-01-01 RX ADMIN — ENOXAPARIN SODIUM 100 MILLIGRAM(S): 100 INJECTION SUBCUTANEOUS at 17:03

## 2023-01-01 RX ADMIN — SODIUM CHLORIDE 750 MILLILITER(S): 9 INJECTION, SOLUTION INTRAVENOUS at 06:04

## 2023-01-01 RX ADMIN — Medication 250 MILLIGRAM(S): at 10:06

## 2023-01-01 RX ADMIN — Medication 10 MILLIGRAM(S): at 01:09

## 2023-01-01 RX ADMIN — I.V. FAT EMULSION 41.67 GM/KG/DAY: 20 EMULSION INTRAVENOUS at 17:39

## 2023-01-01 RX ADMIN — IMIPENEM AND CILASTATIN 250 MILLIGRAM(S): 250; 250 INJECTION, POWDER, FOR SOLUTION INTRAVENOUS at 14:59

## 2023-01-01 RX ADMIN — HEPARIN SODIUM 5000 UNIT(S): 5000 INJECTION INTRAVENOUS; SUBCUTANEOUS at 22:33

## 2023-01-01 RX ADMIN — CEFEPIME 100 MILLIGRAM(S): 1 INJECTION, POWDER, FOR SOLUTION INTRAMUSCULAR; INTRAVENOUS at 09:51

## 2023-01-01 RX ADMIN — Medication 25 MILLIGRAM(S): at 18:24

## 2023-01-01 RX ADMIN — HEPARIN SODIUM 5000 UNIT(S): 5000 INJECTION INTRAVENOUS; SUBCUTANEOUS at 15:45

## 2023-01-01 RX ADMIN — URSODIOL 300 MILLIGRAM(S): 250 TABLET, FILM COATED ORAL at 05:17

## 2023-01-01 RX ADMIN — Medication 5 MILLIGRAM(S): at 04:11

## 2023-01-01 RX ADMIN — Medication 150 MILLIGRAM(S): at 17:16

## 2023-01-01 RX ADMIN — ONDANSETRON 4 MILLIGRAM(S): 8 TABLET, FILM COATED ORAL at 11:07

## 2023-01-01 RX ADMIN — Medication 0.5 MILLIGRAM(S): at 01:05

## 2023-01-01 RX ADMIN — I.V. FAT EMULSION 20.83 GM/KG/DAY: 20 EMULSION INTRAVENOUS at 19:16

## 2023-01-01 RX ADMIN — Medication 2 TABLET(S): at 07:08

## 2023-01-01 RX ADMIN — CHLORHEXIDINE GLUCONATE 15 MILLILITER(S): 213 SOLUTION TOPICAL at 06:28

## 2023-01-01 RX ADMIN — Medication 1 EACH: at 18:45

## 2023-01-01 RX ADMIN — OCTREOTIDE ACETATE 100 MICROGRAM(S): 200 INJECTION, SOLUTION INTRAVENOUS; SUBCUTANEOUS at 02:11

## 2023-01-01 RX ADMIN — Medication 25 MILLIGRAM(S): at 10:09

## 2023-01-01 RX ADMIN — Medication 40 MICROGRAM(S): at 06:18

## 2023-01-01 RX ADMIN — POTASSIUM PHOSPHATE, MONOBASIC POTASSIUM PHOSPHATE, DIBASIC 62.5 MILLIMOLE(S): 236; 224 INJECTION, SOLUTION INTRAVENOUS at 11:12

## 2023-01-01 RX ADMIN — MORPHINE 6 MILLIGRAM(S): 10 SOLUTION ORAL at 17:10

## 2023-01-01 RX ADMIN — Medication 4 MILLIGRAM(S): at 12:51

## 2023-01-01 RX ADMIN — HEPARIN SODIUM 5000 UNIT(S): 5000 INJECTION INTRAVENOUS; SUBCUTANEOUS at 22:06

## 2023-01-01 RX ADMIN — Medication 100 MILLIGRAM(S): at 21:03

## 2023-01-01 RX ADMIN — CHOLESTYRAMINE 4 GRAM(S): 4 POWDER, FOR SUSPENSION ORAL at 21:15

## 2023-01-01 RX ADMIN — Medication 40 MICROGRAM(S): at 21:24

## 2023-01-01 RX ADMIN — I.V. FAT EMULSION 20.83 GM/KG/DAY: 20 EMULSION INTRAVENOUS at 17:00

## 2023-01-01 RX ADMIN — Medication 10 MILLIGRAM(S): at 22:19

## 2023-01-01 RX ADMIN — HEPARIN SODIUM 5000 UNIT(S): 5000 INJECTION INTRAVENOUS; SUBCUTANEOUS at 14:47

## 2023-01-01 RX ADMIN — Medication 4 MILLIGRAM(S): at 03:51

## 2023-01-01 RX ADMIN — Medication 20 MILLIEQUIVALENT(S): at 07:57

## 2023-01-01 RX ADMIN — NYSTATIN CREAM 1 APPLICATION(S): 100000 CREAM TOPICAL at 13:29

## 2023-01-01 RX ADMIN — CHLORHEXIDINE GLUCONATE 1 APPLICATION(S): 213 SOLUTION TOPICAL at 07:14

## 2023-01-01 RX ADMIN — SODIUM CHLORIDE 80 MILLILITER(S): 5 INJECTION, SOLUTION INTRAVENOUS at 04:26

## 2023-01-01 RX ADMIN — Medication 70 MILLILITER(S): at 08:50

## 2023-01-01 RX ADMIN — HEPARIN SODIUM 5000 UNIT(S): 5000 INJECTION INTRAVENOUS; SUBCUTANEOUS at 06:39

## 2023-01-01 RX ADMIN — Medication 400 MILLIGRAM(S): at 09:31

## 2023-01-01 RX ADMIN — Medication 10 MILLIGRAM(S): at 08:24

## 2023-01-01 RX ADMIN — CHOLESTYRAMINE 4 GRAM(S): 4 POWDER, FOR SUSPENSION ORAL at 16:15

## 2023-01-01 RX ADMIN — ONDANSETRON 4 MILLIGRAM(S): 8 TABLET, FILM COATED ORAL at 12:36

## 2023-01-01 RX ADMIN — Medication 40 MICROGRAM(S): at 22:43

## 2023-01-01 RX ADMIN — PANTOPRAZOLE SODIUM 40 MILLIGRAM(S): 20 TABLET, DELAYED RELEASE ORAL at 11:07

## 2023-01-01 RX ADMIN — Medication 100 MILLIGRAM(S): at 22:15

## 2023-01-01 RX ADMIN — CHOLESTYRAMINE 4 GRAM(S): 4 POWDER, FOR SUSPENSION ORAL at 22:18

## 2023-01-01 RX ADMIN — PROPOFOL 6.06 MICROGRAM(S)/KG/MIN: 10 INJECTION, EMULSION INTRAVENOUS at 23:26

## 2023-01-01 RX ADMIN — I.V. FAT EMULSION 41.67 GM/KG/DAY: 20 EMULSION INTRAVENOUS at 17:01

## 2023-01-01 RX ADMIN — SODIUM CHLORIDE 500 MILLILITER(S): 9 INJECTION INTRAMUSCULAR; INTRAVENOUS; SUBCUTANEOUS at 11:59

## 2023-01-01 RX ADMIN — Medication 2 TABLET(S): at 11:56

## 2023-01-01 RX ADMIN — Medication 4 MILLIGRAM(S): at 11:09

## 2023-01-01 RX ADMIN — Medication 150 MILLIGRAM(S): at 05:57

## 2023-01-01 RX ADMIN — Medication 5 MILLIGRAM(S): at 05:58

## 2023-01-01 RX ADMIN — IMIPENEM AND CILASTATIN 250 MILLIGRAM(S): 250; 250 INJECTION, POWDER, FOR SOLUTION INTRAVENOUS at 18:41

## 2023-01-01 RX ADMIN — HEPARIN SODIUM 5000 UNIT(S): 5000 INJECTION INTRAVENOUS; SUBCUTANEOUS at 22:29

## 2023-01-01 RX ADMIN — NYSTATIN CREAM 1 APPLICATION(S): 100000 CREAM TOPICAL at 22:15

## 2023-01-01 RX ADMIN — Medication 150 MILLIGRAM(S): at 12:00

## 2023-01-01 RX ADMIN — NYSTATIN CREAM 1 APPLICATION(S): 100000 CREAM TOPICAL at 23:18

## 2023-01-01 RX ADMIN — NYSTATIN CREAM 1 APPLICATION(S): 100000 CREAM TOPICAL at 13:26

## 2023-01-01 RX ADMIN — HEPARIN SODIUM 5000 UNIT(S): 5000 INJECTION INTRAVENOUS; SUBCUTANEOUS at 13:29

## 2023-01-01 RX ADMIN — MORPHINE 2 MILLIGRAM(S): 10 SOLUTION ORAL at 12:23

## 2023-01-01 RX ADMIN — Medication 150 MILLIGRAM(S): at 07:26

## 2023-01-01 RX ADMIN — AMLODIPINE BESYLATE 5 MILLIGRAM(S): 2.5 TABLET ORAL at 03:10

## 2023-01-01 RX ADMIN — Medication 5 MILLIGRAM(S): at 23:20

## 2023-01-01 RX ADMIN — Medication 1 MILLIGRAM(S): at 22:58

## 2023-01-01 RX ADMIN — Medication 150 MILLIGRAM(S): at 11:46

## 2023-01-01 RX ADMIN — SODIUM CHLORIDE 110 MILLILITER(S): 9 INJECTION, SOLUTION INTRAVENOUS at 20:03

## 2023-01-01 RX ADMIN — Medication 4 MILLIGRAM(S): at 19:04

## 2023-01-01 RX ADMIN — OCTREOTIDE ACETATE 100 MICROGRAM(S): 200 INJECTION, SOLUTION INTRAVENOUS; SUBCUTANEOUS at 10:11

## 2023-01-01 RX ADMIN — HEPARIN SODIUM 5000 UNIT(S): 5000 INJECTION INTRAVENOUS; SUBCUTANEOUS at 21:38

## 2023-01-01 RX ADMIN — Medication 2 TABLET(S): at 11:58

## 2023-01-01 RX ADMIN — Medication 150 MILLIGRAM(S): at 17:52

## 2023-01-01 RX ADMIN — HYDROMORPHONE HYDROCHLORIDE 0.5 MILLIGRAM(S): 2 INJECTION INTRAMUSCULAR; INTRAVENOUS; SUBCUTANEOUS at 10:36

## 2023-01-01 RX ADMIN — MORPHINE 2 MILLIGRAM(S): 10 SOLUTION ORAL at 05:59

## 2023-01-01 RX ADMIN — Medication 4 MILLIGRAM(S): at 12:50

## 2023-01-01 RX ADMIN — Medication 25 MILLIGRAM(S): at 19:51

## 2023-01-01 RX ADMIN — FLUCONAZOLE 100 MILLIGRAM(S): 150 TABLET ORAL at 00:37

## 2023-01-01 RX ADMIN — Medication 20 MILLIGRAM(S): at 14:23

## 2023-01-01 RX ADMIN — I.V. FAT EMULSION 41.67 GM/KG/DAY: 20 EMULSION INTRAVENOUS at 17:58

## 2023-01-01 RX ADMIN — Medication 5 MILLIGRAM(S): at 12:44

## 2023-01-01 RX ADMIN — I.V. FAT EMULSION 20.83 GM/KG/DAY: 20 EMULSION INTRAVENOUS at 17:08

## 2023-01-01 RX ADMIN — CHOLESTYRAMINE 4 GRAM(S): 4 POWDER, FOR SUSPENSION ORAL at 09:54

## 2023-01-01 RX ADMIN — Medication 5 MILLIGRAM(S): at 02:21

## 2023-01-01 RX ADMIN — MORPHINE 2 MILLIGRAM(S): 10 SOLUTION ORAL at 23:02

## 2023-01-01 RX ADMIN — OCTREOTIDE ACETATE 100 MICROGRAM(S): 200 INJECTION, SOLUTION INTRAVENOUS; SUBCUTANEOUS at 05:53

## 2023-01-01 RX ADMIN — NYSTATIN CREAM 1 APPLICATION(S): 100000 CREAM TOPICAL at 06:19

## 2023-01-01 RX ADMIN — DIPHENHYDRAMINE HYDROCHLORIDE AND LIDOCAINE HYDROCHLORIDE AND ALUMINUM HYDROXIDE AND MAGNESIUM HYDRO 10 MILLILITER(S): KIT at 06:22

## 2023-01-01 RX ADMIN — AMIODARONE HYDROCHLORIDE 16.7 MG/MIN: 400 TABLET ORAL at 08:47

## 2023-01-01 RX ADMIN — CEFEPIME 100 MILLIGRAM(S): 1 INJECTION, POWDER, FOR SOLUTION INTRAMUSCULAR; INTRAVENOUS at 21:53

## 2023-01-01 RX ADMIN — Medication 25 MILLIGRAM(S): at 06:00

## 2023-01-01 RX ADMIN — Medication 2 TABLET(S): at 11:43

## 2023-01-01 RX ADMIN — HEPARIN SODIUM 5000 UNIT(S): 5000 INJECTION INTRAVENOUS; SUBCUTANEOUS at 15:49

## 2023-01-01 RX ADMIN — OCTREOTIDE ACETATE 100 MICROGRAM(S): 200 INJECTION, SOLUTION INTRAVENOUS; SUBCUTANEOUS at 05:59

## 2023-01-01 RX ADMIN — OCTREOTIDE ACETATE 100 MICROGRAM(S): 200 INJECTION, SOLUTION INTRAVENOUS; SUBCUTANEOUS at 14:33

## 2023-01-01 RX ADMIN — OCTREOTIDE ACETATE 100 MICROGRAM(S): 200 INJECTION, SOLUTION INTRAVENOUS; SUBCUTANEOUS at 18:52

## 2023-01-01 RX ADMIN — CEFTRIAXONE 100 MILLIGRAM(S): 500 INJECTION, POWDER, FOR SOLUTION INTRAMUSCULAR; INTRAVENOUS at 21:03

## 2023-01-01 RX ADMIN — AMIODARONE HYDROCHLORIDE 16.7 MG/MIN: 400 TABLET ORAL at 23:22

## 2023-01-01 RX ADMIN — Medication 40 MICROGRAM(S): at 23:17

## 2023-01-01 RX ADMIN — MORPHINE 6 MILLIGRAM(S): 10 SOLUTION ORAL at 13:48

## 2023-01-01 RX ADMIN — Medication 1000 MILLIGRAM(S): at 13:09

## 2023-01-01 RX ADMIN — CEFEPIME 100 MILLIGRAM(S): 1 INJECTION, POWDER, FOR SOLUTION INTRAMUSCULAR; INTRAVENOUS at 09:06

## 2023-01-01 RX ADMIN — I.V. FAT EMULSION 41.67 GM/KG/DAY: 20 EMULSION INTRAVENOUS at 17:22

## 2023-01-01 RX ADMIN — SODIUM CHLORIDE 100 MILLILITER(S): 9 INJECTION, SOLUTION INTRAVENOUS at 05:57

## 2023-01-01 RX ADMIN — HYDROMORPHONE HYDROCHLORIDE 0.5 MILLIGRAM(S): 2 INJECTION INTRAMUSCULAR; INTRAVENOUS; SUBCUTANEOUS at 20:10

## 2023-01-01 RX ADMIN — OCTREOTIDE ACETATE 100 MICROGRAM(S): 200 INJECTION, SOLUTION INTRAVENOUS; SUBCUTANEOUS at 17:37

## 2023-01-01 RX ADMIN — Medication 2: at 07:32

## 2023-01-01 RX ADMIN — OCTREOTIDE ACETATE 100 MICROGRAM(S): 200 INJECTION, SOLUTION INTRAVENOUS; SUBCUTANEOUS at 10:19

## 2023-01-01 RX ADMIN — Medication 40 MICROGRAM(S): at 22:04

## 2023-01-01 RX ADMIN — Medication 2 TABLET(S): at 06:07

## 2023-01-01 RX ADMIN — MORPHINE 2 MILLIGRAM(S): 10 SOLUTION ORAL at 05:44

## 2023-01-01 RX ADMIN — Medication 60 MILLIGRAM(S): at 17:51

## 2023-01-01 RX ADMIN — CEFEPIME 100 MILLIGRAM(S): 1 INJECTION, POWDER, FOR SOLUTION INTRAMUSCULAR; INTRAVENOUS at 17:39

## 2023-01-01 RX ADMIN — HYDROMORPHONE HYDROCHLORIDE 0.25 MILLIGRAM(S): 2 INJECTION INTRAMUSCULAR; INTRAVENOUS; SUBCUTANEOUS at 23:00

## 2023-01-01 RX ADMIN — Medication 40 MICROGRAM(S): at 22:53

## 2023-01-01 RX ADMIN — Medication 4 MILLIGRAM(S): at 17:41

## 2023-01-01 RX ADMIN — PANTOPRAZOLE SODIUM 40 MILLIGRAM(S): 20 TABLET, DELAYED RELEASE ORAL at 13:02

## 2023-01-01 RX ADMIN — MORPHINE 2 MILLIGRAM(S): 10 SOLUTION ORAL at 12:00

## 2023-01-01 RX ADMIN — NYSTATIN CREAM 1 APPLICATION(S): 100000 CREAM TOPICAL at 14:51

## 2023-01-01 RX ADMIN — Medication 50 MILLILITER(S): at 05:32

## 2023-01-01 RX ADMIN — HEPARIN SODIUM 5000 UNIT(S): 5000 INJECTION INTRAVENOUS; SUBCUTANEOUS at 21:33

## 2023-01-01 RX ADMIN — HEPARIN SODIUM 5000 UNIT(S): 5000 INJECTION INTRAVENOUS; SUBCUTANEOUS at 13:03

## 2023-01-01 RX ADMIN — Medication 5 MILLIGRAM(S): at 12:03

## 2023-01-01 RX ADMIN — Medication 3 MILLILITER(S): at 05:03

## 2023-01-01 RX ADMIN — NYSTATIN CREAM 1 APPLICATION(S): 100000 CREAM TOPICAL at 05:45

## 2023-01-01 RX ADMIN — I.V. FAT EMULSION 20.83 GM/KG/DAY: 20 EMULSION INTRAVENOUS at 18:13

## 2023-01-01 RX ADMIN — OCTREOTIDE ACETATE 100 MICROGRAM(S): 200 INJECTION, SOLUTION INTRAVENOUS; SUBCUTANEOUS at 18:35

## 2023-01-01 RX ADMIN — Medication 100 MILLIGRAM(S): at 09:33

## 2023-01-01 RX ADMIN — ONDANSETRON 4 MILLIGRAM(S): 8 TABLET, FILM COATED ORAL at 12:14

## 2023-01-01 RX ADMIN — Medication 4 MILLIGRAM(S): at 05:12

## 2023-01-01 RX ADMIN — PANTOPRAZOLE SODIUM 40 MILLIGRAM(S): 20 TABLET, DELAYED RELEASE ORAL at 10:44

## 2023-01-01 RX ADMIN — Medication 1 EACH: at 17:41

## 2023-01-01 RX ADMIN — Medication 25 MILLIGRAM(S): at 06:11

## 2023-01-01 RX ADMIN — I.V. FAT EMULSION 20.83 GM/KG/DAY: 20 EMULSION INTRAVENOUS at 17:35

## 2023-01-01 RX ADMIN — Medication 100 MILLIEQUIVALENT(S): at 15:15

## 2023-01-01 RX ADMIN — HEPARIN SODIUM 5000 UNIT(S): 5000 INJECTION INTRAVENOUS; SUBCUTANEOUS at 07:34

## 2023-01-01 RX ADMIN — NYSTATIN CREAM 1 APPLICATION(S): 100000 CREAM TOPICAL at 13:01

## 2023-01-01 RX ADMIN — Medication 10 MILLIGRAM(S): at 11:08

## 2023-01-01 RX ADMIN — Medication 2 TABLET(S): at 00:09

## 2023-01-01 RX ADMIN — MORPHINE 6 MILLIGRAM(S): 10 SOLUTION ORAL at 00:03

## 2023-01-01 RX ADMIN — GABAPENTIN 100 MILLIGRAM(S): 400 CAPSULE ORAL at 22:00

## 2023-01-01 RX ADMIN — HYDROMORPHONE HYDROCHLORIDE 0.5 MILLIGRAM(S): 2 INJECTION INTRAMUSCULAR; INTRAVENOUS; SUBCUTANEOUS at 14:32

## 2023-01-01 RX ADMIN — Medication 10 MILLIGRAM(S): at 10:11

## 2023-01-01 RX ADMIN — Medication 400 MILLIGRAM(S): at 02:24

## 2023-01-01 RX ADMIN — Medication 25 MILLIGRAM(S): at 21:29

## 2023-01-01 RX ADMIN — MORPHINE 2 MILLIGRAM(S): 10 SOLUTION ORAL at 05:45

## 2023-01-01 RX ADMIN — IMIPENEM AND CILASTATIN 250 MILLIGRAM(S): 250; 250 INJECTION, POWDER, FOR SOLUTION INTRAVENOUS at 17:35

## 2023-01-01 RX ADMIN — Medication 25 GRAM(S): at 17:50

## 2023-01-01 RX ADMIN — CHOLESTYRAMINE 4 GRAM(S): 4 POWDER, FOR SUSPENSION ORAL at 21:19

## 2023-01-01 RX ADMIN — Medication 4 MILLIGRAM(S): at 17:44

## 2023-01-01 RX ADMIN — ENOXAPARIN SODIUM 100 MILLIGRAM(S): 100 INJECTION SUBCUTANEOUS at 06:29

## 2023-01-01 RX ADMIN — OCTREOTIDE ACETATE 100 MICROGRAM(S): 200 INJECTION, SOLUTION INTRAVENOUS; SUBCUTANEOUS at 17:03

## 2023-01-01 RX ADMIN — Medication 5 MILLIGRAM(S): at 05:13

## 2023-01-01 RX ADMIN — FLUCONAZOLE 100 MILLIGRAM(S): 150 TABLET ORAL at 11:21

## 2023-01-01 RX ADMIN — AMIODARONE HYDROCHLORIDE 16.7 MG/MIN: 400 TABLET ORAL at 08:58

## 2023-01-01 RX ADMIN — Medication 30 MICROGRAM(S): at 21:19

## 2023-01-01 RX ADMIN — IMIPENEM AND CILASTATIN 100 MILLIGRAM(S): 250; 250 INJECTION, POWDER, FOR SOLUTION INTRAVENOUS at 18:09

## 2023-01-01 RX ADMIN — HEPARIN SODIUM 5000 UNIT(S): 5000 INJECTION INTRAVENOUS; SUBCUTANEOUS at 13:54

## 2023-01-01 RX ADMIN — Medication 5 MILLIGRAM(S): at 18:16

## 2023-01-01 RX ADMIN — Medication 5 MILLIGRAM(S): at 10:06

## 2023-01-01 RX ADMIN — SODIUM CHLORIDE 1500 MILLILITER(S): 9 INJECTION, SOLUTION INTRAVENOUS at 13:49

## 2023-01-01 RX ADMIN — Medication 4 MILLIGRAM(S): at 07:08

## 2023-01-01 RX ADMIN — Medication 250 MILLIGRAM(S): at 04:27

## 2023-01-01 RX ADMIN — Medication 2 TABLET(S): at 19:11

## 2023-01-01 RX ADMIN — CHOLESTYRAMINE 4 GRAM(S): 4 POWDER, FOR SUSPENSION ORAL at 11:08

## 2023-01-01 RX ADMIN — SIMETHICONE 80 MILLIGRAM(S): 80 TABLET, CHEWABLE ORAL at 09:05

## 2023-01-01 RX ADMIN — NYSTATIN CREAM 1 APPLICATION(S): 100000 CREAM TOPICAL at 06:03

## 2023-01-01 RX ADMIN — Medication 10 MILLIGRAM(S): at 12:27

## 2023-01-01 RX ADMIN — MORPHINE 6 MILLIGRAM(S): 10 SOLUTION ORAL at 11:23

## 2023-01-01 RX ADMIN — Medication 250 MILLIGRAM(S): at 22:35

## 2023-01-01 RX ADMIN — Medication 10 MILLIGRAM(S): at 11:16

## 2023-01-01 RX ADMIN — Medication 2 TABLET(S): at 00:43

## 2023-01-01 RX ADMIN — Medication 150 MILLIGRAM(S): at 18:47

## 2023-01-01 RX ADMIN — Medication 10 MILLIGRAM(S): at 21:30

## 2023-01-01 RX ADMIN — ENOXAPARIN SODIUM 100 MILLIGRAM(S): 100 INJECTION SUBCUTANEOUS at 17:11

## 2023-01-01 RX ADMIN — Medication 5 MILLIGRAM(S): at 18:05

## 2023-01-01 RX ADMIN — Medication 30 MICROGRAM(S): at 22:47

## 2023-01-01 RX ADMIN — OCTREOTIDE ACETATE 100 MICROGRAM(S): 200 INJECTION, SOLUTION INTRAVENOUS; SUBCUTANEOUS at 16:51

## 2023-01-01 RX ADMIN — Medication 4 MILLIGRAM(S): at 06:15

## 2023-01-01 RX ADMIN — HEPARIN SODIUM 5000 UNIT(S): 5000 INJECTION INTRAVENOUS; SUBCUTANEOUS at 23:48

## 2023-01-01 RX ADMIN — Medication 65 MILLILITER(S): at 21:00

## 2023-01-01 RX ADMIN — FLUCONAZOLE 100 MILLIGRAM(S): 150 TABLET ORAL at 15:00

## 2023-01-01 RX ADMIN — PANTOPRAZOLE SODIUM 40 MILLIGRAM(S): 20 TABLET, DELAYED RELEASE ORAL at 10:03

## 2023-01-01 RX ADMIN — MORPHINE 6 MILLIGRAM(S): 10 SOLUTION ORAL at 12:22

## 2023-01-01 RX ADMIN — URSODIOL 300 MILLIGRAM(S): 250 TABLET, FILM COATED ORAL at 07:20

## 2023-01-01 RX ADMIN — AMLODIPINE BESYLATE 10 MILLIGRAM(S): 2.5 TABLET ORAL at 05:42

## 2023-01-01 RX ADMIN — I.V. FAT EMULSION 20.83 GM/KG/DAY: 20 EMULSION INTRAVENOUS at 17:53

## 2023-01-01 RX ADMIN — HEPARIN SODIUM 5000 UNIT(S): 5000 INJECTION INTRAVENOUS; SUBCUTANEOUS at 06:15

## 2023-01-01 RX ADMIN — SCOPALAMINE 1 PATCH: 1 PATCH, EXTENDED RELEASE TRANSDERMAL at 17:51

## 2023-01-01 RX ADMIN — Medication 5 MILLIGRAM(S): at 12:33

## 2023-01-01 RX ADMIN — CHOLESTYRAMINE 4 GRAM(S): 4 POWDER, FOR SUSPENSION ORAL at 09:06

## 2023-01-01 RX ADMIN — Medication 10 MILLIGRAM(S): at 17:28

## 2023-01-01 RX ADMIN — Medication 1000 MILLIGRAM(S): at 03:15

## 2023-01-01 RX ADMIN — Medication 2 TABLET(S): at 18:35

## 2023-01-01 RX ADMIN — AMLODIPINE BESYLATE 10 MILLIGRAM(S): 2.5 TABLET ORAL at 18:25

## 2023-01-01 RX ADMIN — I.V. FAT EMULSION 20.83 GM/KG/DAY: 20 EMULSION INTRAVENOUS at 18:53

## 2023-01-01 RX ADMIN — HYDROMORPHONE HYDROCHLORIDE 0.5 MILLIGRAM(S): 2 INJECTION INTRAMUSCULAR; INTRAVENOUS; SUBCUTANEOUS at 21:32

## 2023-01-01 RX ADMIN — IMIPENEM AND CILASTATIN 250 MILLIGRAM(S): 250; 250 INJECTION, POWDER, FOR SOLUTION INTRAVENOUS at 08:39

## 2023-01-01 RX ADMIN — Medication 0.5 MILLIGRAM(S): at 23:24

## 2023-01-01 RX ADMIN — Medication 50 MICROGRAM(S): at 05:05

## 2023-01-01 RX ADMIN — POTASSIUM PHOSPHATE, MONOBASIC POTASSIUM PHOSPHATE, DIBASIC 83.33 MILLIMOLE(S): 236; 224 INJECTION, SOLUTION INTRAVENOUS at 08:36

## 2023-01-01 RX ADMIN — Medication 150 MILLIGRAM(S): at 06:28

## 2023-01-01 RX ADMIN — URSODIOL 300 MILLIGRAM(S): 250 TABLET, FILM COATED ORAL at 14:48

## 2023-01-01 RX ADMIN — SODIUM CHLORIDE 500 MILLILITER(S): 9 INJECTION, SOLUTION INTRAVENOUS at 15:57

## 2023-01-01 RX ADMIN — HEPARIN SODIUM 5000 UNIT(S): 5000 INJECTION INTRAVENOUS; SUBCUTANEOUS at 14:38

## 2023-01-01 RX ADMIN — Medication 150 MILLIGRAM(S): at 17:51

## 2023-01-01 RX ADMIN — Medication 4 MILLIGRAM(S): at 00:42

## 2023-01-01 RX ADMIN — LIDOCAINE 10 MILLILITER(S): 4 CREAM TOPICAL at 13:35

## 2023-01-01 RX ADMIN — Medication 10 MILLIGRAM(S): at 04:37

## 2023-01-01 RX ADMIN — Medication 100 MILLIGRAM(S): at 13:22

## 2023-01-01 RX ADMIN — AMLODIPINE BESYLATE 10 MILLIGRAM(S): 2.5 TABLET ORAL at 06:19

## 2023-01-01 RX ADMIN — HYDROMORPHONE HYDROCHLORIDE 0.5 MILLIGRAM(S): 2 INJECTION INTRAMUSCULAR; INTRAVENOUS; SUBCUTANEOUS at 10:16

## 2023-01-01 RX ADMIN — Medication 1 EACH: at 18:06

## 2023-01-01 RX ADMIN — OCTREOTIDE ACETATE 100 MICROGRAM(S): 200 INJECTION, SOLUTION INTRAVENOUS; SUBCUTANEOUS at 14:51

## 2023-01-01 RX ADMIN — AMLODIPINE BESYLATE 5 MILLIGRAM(S): 2.5 TABLET ORAL at 06:34

## 2023-01-01 RX ADMIN — OCTREOTIDE ACETATE 100 MICROGRAM(S): 200 INJECTION, SOLUTION INTRAVENOUS; SUBCUTANEOUS at 17:43

## 2023-01-01 RX ADMIN — I.V. FAT EMULSION 41.67 GM/KG/DAY: 20 EMULSION INTRAVENOUS at 19:22

## 2023-01-01 RX ADMIN — CHLORHEXIDINE GLUCONATE 1 APPLICATION(S): 213 SOLUTION TOPICAL at 07:07

## 2023-01-01 RX ADMIN — SODIUM CHLORIDE 1000 MILLILITER(S): 9 INJECTION INTRAMUSCULAR; INTRAVENOUS; SUBCUTANEOUS at 16:42

## 2023-01-01 RX ADMIN — SODIUM CHLORIDE 100 MILLILITER(S): 9 INJECTION, SOLUTION INTRAVENOUS at 22:48

## 2023-01-01 RX ADMIN — Medication 1 MILLIGRAM(S): at 23:39

## 2023-01-01 RX ADMIN — Medication 40 MICROGRAM(S): at 06:33

## 2023-01-01 RX ADMIN — Medication 25 MILLIGRAM(S): at 09:54

## 2023-01-01 RX ADMIN — Medication 4 MILLIGRAM(S): at 09:49

## 2023-01-01 RX ADMIN — CHOLESTYRAMINE 4 GRAM(S): 4 POWDER, FOR SUSPENSION ORAL at 09:31

## 2023-01-01 RX ADMIN — SODIUM CHLORIDE 100 MILLILITER(S): 9 INJECTION, SOLUTION INTRAVENOUS at 01:58

## 2023-01-01 RX ADMIN — CHOLESTYRAMINE 4 GRAM(S): 4 POWDER, FOR SUSPENSION ORAL at 09:05

## 2023-01-01 RX ADMIN — Medication 5 MILLIGRAM(S): at 23:08

## 2023-01-01 RX ADMIN — Medication 150 MILLIGRAM(S): at 06:46

## 2023-01-01 RX ADMIN — CHOLESTYRAMINE 4 GRAM(S): 4 POWDER, FOR SUSPENSION ORAL at 08:40

## 2023-01-01 RX ADMIN — Medication 50 MILLILITER(S): at 21:00

## 2023-01-01 RX ADMIN — Medication 5 MILLIGRAM(S): at 04:27

## 2023-01-01 RX ADMIN — MORPHINE 6 MILLIGRAM(S): 10 SOLUTION ORAL at 17:34

## 2023-01-01 RX ADMIN — OCTREOTIDE ACETATE 100 MICROGRAM(S): 200 INJECTION, SOLUTION INTRAVENOUS; SUBCUTANEOUS at 05:55

## 2023-01-01 RX ADMIN — Medication 5 MILLIGRAM(S): at 12:22

## 2023-01-01 RX ADMIN — Medication 50 MILLILITER(S): at 16:19

## 2023-01-01 RX ADMIN — ONDANSETRON 4 MILLIGRAM(S): 8 TABLET, FILM COATED ORAL at 08:36

## 2023-01-01 RX ADMIN — OCTREOTIDE ACETATE 100 MICROGRAM(S): 200 INJECTION, SOLUTION INTRAVENOUS; SUBCUTANEOUS at 17:50

## 2023-01-17 ENCOUNTER — APPOINTMENT (OUTPATIENT)
Dept: OTOLARYNGOLOGY | Facility: CLINIC | Age: 78
End: 2023-01-17
Payer: SELF-PAY

## 2023-01-17 PROCEDURE — 92593: CPT | Mod: NC

## 2023-02-21 ENCOUNTER — APPOINTMENT (OUTPATIENT)
Dept: OTOLARYNGOLOGY | Facility: CLINIC | Age: 78
End: 2023-02-21
Payer: MEDICARE

## 2023-02-21 VITALS — WEIGHT: 215 LBS | BODY MASS INDEX: 27.59 KG/M2 | HEIGHT: 74 IN

## 2023-02-21 DIAGNOSIS — H61.22 IMPACTED CERUMEN, LEFT EAR: ICD-10-CM

## 2023-02-21 DIAGNOSIS — H90.3 SENSORINEURAL HEARING LOSS, BILATERAL: ICD-10-CM

## 2023-02-21 PROCEDURE — 92557 COMPREHENSIVE HEARING TEST: CPT

## 2023-02-21 PROCEDURE — 99213 OFFICE O/P EST LOW 20 MIN: CPT | Mod: 25

## 2023-02-21 PROCEDURE — 92567 TYMPANOMETRY: CPT

## 2023-02-21 PROCEDURE — G0268 REMOVAL OF IMPACTED WAX MD: CPT

## 2023-02-21 RX ORDER — CLINDAMYCIN HYDROCHLORIDE 150 MG/1
150 CAPSULE ORAL
Qty: 16 | Refills: 0 | Status: DISCONTINUED | COMMUNITY
Start: 2017-07-05 | End: 2023-02-21

## 2023-02-21 NOTE — CONSULT LETTER
[FreeTextEntry2] : AHMET ROMEO\par  [FreeTextEntry1] : \par \par Dear  Dr. AHMET ROMEO,\par \par I had the pleasure of seeing your patient today.  \par Please see my note below.\par \par \par Thank you very much for allowing me to participate in the care of your patient.\par \par Sincerely,\par \par \par Leighton Bustos MD\par [facility]\par [facility]\par  [facility]\par \par

## 2023-02-21 NOTE — HISTORY OF PRESENT ILLNESS
[de-identified] : LARRY KELLY is a 77 year old male who comes in complaining of a cerumen impaction.  He has the sensorineural hearing losses and has been doing quite well with his hearing aids.  The patient had no other ear nose or throat complaints at this visit.

## 2023-02-21 NOTE — PHYSICAL EXAM
[FreeTextEntry1] : \par The patient was alert and oriented and in no distress.\par Voice was clear.\par \par Face:\par The patient had no facial asymmetry or mass.\par The skin was unremarkable.\par \par Eyes:\par The pupils were equal round and reactive to light and accommodation.\par There was no significant nystagmus or disconjugate gaze noted.\par \par Nose: \par The external nose had no significant deformity.  There was no facial tenderness.  On anterior rhinoscopy, the nasal mucosa was clear.  The anterior septum was midline.  There were no visualized polyps purulence  or masses.\par \par Oral cavity:\par The oral mucosa was normal.\par The oral and base of tongue were clear and without mass.\par The gingival and buccal mucosa were moist and without lesions.\par The palate moved well.\par There was no cleft to the palate.\par There appeared to be good salivary flow.  \par There was no pus, erythema or mass in the oral cavity.\par \par Ears:\par Procedure note: Debridement of cerumen impaction left ear   51551\par \par Dx:  symptomatic cerumen impaction left ear \par Both external ears were normal.\par There was a dense cerumen impaction in the left ear.  This was cleared microscopically without trauma, using suction and curettes.  Beyond that, both ear canals were clear and both eardrums were intact and mobile.  There was cerumen coating the left tympanic membrane that was removed microscopically without trauma\par \par Neck: \par The neck was symmetrical.\par The parotid and submandibular glands were normal without masses.\par The trachea was midline and there was no unusual crepitus.\par The thyroid was smooth and nontender and no masses were palpated.\par There was no significant cervical adenopathy.\par \par \par Neuro:\par Neurologically, the patient was awake, alert, and oriented to person, place and time. There were no obvious focal neurologic abnormalities.  Cranial nerves II through XII were grossly intact.\par \par \par TMJ:\par The temporomandibular joints were nontender.\par There was no abnormal crepitus and no significant malocclusion\par ` [de-identified] : Audiogram:\par \par Audiometry showed that both ears had below normal hearing through the speech frequencies with high pitched symmetric sensorineural hearing loss as above that.  Reflexes were intact and the patient had type A tympanograms.  The audiogram was reviewed with the patient.  He had lost about 5 dB from last year.

## 2023-02-21 NOTE — REVIEW OF SYSTEMS
[Hearing Loss] : hearing loss [Negative] : Heme/Lymph [de-identified] : tinnitus  09-Apr-2022 10:54

## 2023-02-21 NOTE — ASSESSMENT
[FreeTextEntry1] : It was my impression that the patient had a cerumen impaction that was cleared.  I recommended topical moisturizing.  I recommended avoiding Q-tips.  I reviewed aural hygiene and the role of cerumen with the patient.  I suggested a repeat visit in a year or earlier should the need arise.\par He has the symmetric sensorineural hearing losses with excellent discrimination.  He had lost just a few decibels over the last year and is doing quite well with his hearing aids.  I recommended having his hearing aids adjusted as needed as well

## 2023-03-08 ENCOUNTER — APPOINTMENT (OUTPATIENT)
Dept: OTOLARYNGOLOGY | Facility: CLINIC | Age: 78
End: 2023-03-08

## 2023-05-19 ENCOUNTER — APPOINTMENT (OUTPATIENT)
Dept: OTOLARYNGOLOGY | Facility: CLINIC | Age: 78
End: 2023-05-19

## 2023-05-25 ENCOUNTER — APPOINTMENT (OUTPATIENT)
Dept: OTOLARYNGOLOGY | Facility: CLINIC | Age: 78
End: 2023-05-25
Payer: SELF-PAY

## 2023-05-25 PROCEDURE — 92593: CPT | Mod: NC

## 2023-06-23 NOTE — ED ADULT NURSE REASSESSMENT NOTE - NS ED NURSE REASSESS COMMENT FT1
NG tube inserted in L nostril by Surgical team. Pt A&Ox4, respirations even and unlabored, skin color WDL warm and dry, pt is ambulatory with a steady gait. No acute distress observed.

## 2023-06-23 NOTE — ED PROVIDER NOTE - NSICDXPASTMEDICALHX_GEN_ALL_CORE_FT
PAST MEDICAL HISTORY:  ELVI (acute kidney injury) No hx of CKD   Creatinine 1.5 on this admisison 4/5/22    Atrial fibrillation s/p cardioversion 2010 and 2014  Pt. reports 4 DCCV  Now on Amiodarone 200mg PO bid and Eliquis 5mg PO bid  Last DCCV 4 yrs ago at [facility]    Crohn's disease s/p partial resection of ileum    Essential hypertension Hypertension    History of depression On Venlafaxine ER 150mg PO bid    Hyperlipidemia     Hypothyroidism

## 2023-06-23 NOTE — ED ADULT TRIAGE NOTE - CHIEF COMPLAINT QUOTE
Pt states " I have colon resection 30 years ago I feel like I have a bowel obstruction". Pt had BM today. Pt has hx of HTN and HDL. Pt also reports intermittent abd pain. Denies fever or chills, cp, sob, n/v/d,  sx.

## 2023-06-23 NOTE — ED PROVIDER NOTE - OBJECTIVE STATEMENT
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 Develoepd severe pain last night, still passing gas, feels distended, last BM this am. PCP Dr. Aguiar ordered CT scan, wet read w/ obstruction, so came to the ED. Happened 2x before but pain resolved on its own, never needed hospitalization.
2 = assistive person

## 2023-06-23 NOTE — PROGRESS NOTE ADULT - SUBJECTIVE AND OBJECTIVE BOX
longstanding Crohn's   multiple surgeries  24 hours of progressively worsening abd pain and nausea  small flatus  Ct consistent with PSBO  AVSS   Distended and tympanitic  CT with gastric distention  Labs OK    NGT  NPO  IVF

## 2023-06-23 NOTE — H&P ADULT - ASSESSMENT
78M w/ Crohn's, AFib/Flutter s/p DCCVs, remote ileocectomy and open appy here for resolving SBO, s/p NGT decompression.    NPO/IVF  NGT to LIWS  Pain/nausea PRN  Amio GTT  OOBA/IS  SCDs/SQH  TATE  Admit team 1, regional, Dr. Peterson

## 2023-06-23 NOTE — ED PROVIDER NOTE - CONSIDERATION OF ADMISSION OBSERVATION
will need admission for further monitoring, workup, and possible treatment surgically Consideration of Admission/Observation

## 2023-06-23 NOTE — CHART NOTE - NSCHARTNOTEFT_GEN_A_CORE
SERIAL ABDOMINAL EXAM    SUBJECTIVE  Pt seen an examined at bedside. Patient is lying in bed comfortably, pain well controlled. Patient denies fever, nausea, vomiting, chest pain, shortness of breath, and calf tenderness. Patient is passing gas and having bowel movements.     T(C): 36.4 (06-23-23 @ 19:16), Max: 36.5 (06-23-23 @ 18:49)  HR: 56 (06-23-23 @ 20:39) (50 - 98)  BP: 162/74 (06-23-23 @ 20:39) (132/63 - 224/69)  RR: 18 (06-23-23 @ 20:39) (18 - 20)  SpO2: 95% (06-23-23 @ 20:39) (95% - 98%)    OBJECTIVE    PHYSICAL EXAM  General: Patient is doing well and lying in bed comfortably  Constitutional: Alert and Oriented  Pulm: Nonlabored breathing, no respiratory distress  CV: Regular rate and rhythm  Abd: soft, nontender, nondistended. No guarding or rebound  Extremities: warm, well perfused, no edema SERIAL ABDOMINAL EXAM    SUBJECTIVE  Pt seen an examined at bedside. He recently arrived to the telemetry unit from ED holding. Patient is lying in bed comfortably. He states that his abdominal pain has improved since NGT was placed. He reports NGT discomfort, but denies nausea, vomiting, shortness of breath, and chest pain. NGT flushed.     T(C): 36.4 (06-23-23 @ 19:16), Max: 36.5 (06-23-23 @ 18:49)  HR: 56 (06-23-23 @ 20:39) (50 - 98)  BP: 162/74 (06-23-23 @ 20:39) (132/63 - 224/69)  RR: 18 (06-23-23 @ 20:39) (18 - 20)  SpO2: 95% (06-23-23 @ 20:39) (95% - 98%)    OBJECTIVE    PHYSICAL EXAM  General: Patient is doing well and lying in bed comfortably with NGT   Constitutional: Alert and Oriented  Pulm: Nonlabored breathing, no respiratory distress  CV: Regular rate and rhythm  Abd: soft, mildly distended, nontender, no rebound or guarding   Extremities: warm, well perfused, no edema    ASSESSMENT/PLAN   78M w/ Crohn's, AFib/Flutter s/p DCCVs, remote ileocectomy and open appy here for resolving SBO, s/p NGT decompression.    NPO/IVF  NGT to LIWS  Pain/nausea PRN  Amio GTT  OOBA/IS  SCDs/SQH  TATE  AM labs

## 2023-06-23 NOTE — ED PROVIDER NOTE - PHYSICAL EXAMINATION
CONSTITUTIONAL: Well-appearing; well-nourished; in no apparent distress.   HEAD: Normocephalic; atraumatic.   EYES:  conjunctiva and sclera clear  ENT: normal nose; no rhinorrhea;  NECK: Supple; full ROM  RESPIRATORY: Breathing easily; no resp difficulty  GI: firm, distended, mild diffuse tenderness without rebound or peritoneal signs  EXT: No cyanosis or edema;  SKIN: Normal for age and race; warm; dry; good turgor; no apparent lesions or rash.   NEURO: A & O x 3; face symmetric; grossly unremarkable.   PSYCHOLOGICAL: The patient’s mood and manner are appropriate.

## 2023-06-23 NOTE — H&P ADULT - NSHPLABSRESULTS_GEN_ALL_CORE
LABS:                        13.0   11.77 )-----------( 205      ( 23 Jun 2023 14:16 )             39.6     06-23    139  |  102  |  15  ----------------------------<  119<H>  4.5   |  27  |  1.14    Ca    9.3      23 Jun 2023 14:16    TPro  8.0  /  Alb  4.0  /  TBili  0.8  /  DBili  x   /  AST  26  /  ALT  16  /  AlkPhos  88  06-23    PT/INR - ( 23 Jun 2023 14:16 )   PT: 12.5 sec;   INR: 1.05          PTT - ( 23 Jun 2023 14:16 )  PTT:27.1 sec

## 2023-06-23 NOTE — H&P ADULT - NSICDXPASTMEDICALHX_GEN_ALL_CORE_FT
PAST MEDICAL HISTORY:  Atrial fibrillation s/p cardioversion 2010 and 2014  Pt. reports 4 DCCV  Now on Amiodarone 200mg PO bid and Eliquis 5mg PO bid  Last DCCV 4 yrs ago at [facility]    Crohn's disease s/p partial resection of ileum    Essential hypertension Hypertension    History of depression On Venlafaxine ER 150mg PO bid    Hyperlipidemia     Hypothyroidism

## 2023-06-23 NOTE — ED ADULT NURSE NOTE - OBJECTIVE STATEMENT
79 y/o M ambulated to ED with c/o of abdominal cramping. HX colon resection 30 years ago, Afib/Flutter, not on anti-coags.  Pt states he came to ED as per his MDs recommendation. PT states he feels like he has an abdominal obstruction.  Last BM today, denies chest pain, SOB, N/V, s/s of GI/ bleeding, N/V/D. PT A&Ox4, respirations even and unlabored, skin color WDL warm and dry, pt is ambulatory with a steady gait. No acute distress observed.  P

## 2023-06-23 NOTE — H&P ADULT - NSHPPHYSICALEXAM_GEN_ALL_CORE
Physical Exam  General: NAD, laying comfortably in bed  Cardio: S1,S2, No MRG, RRR  Pulm: No respiratory distress, breathing comfortably on room air  Abdomen: soft, nontender, mildly distended  Extremities: WWP, peripheral pulses appreciated  Neuro: CNII-XII grossly intact, no gross motor deficits  Psych: AAOx3, pleasant

## 2023-06-23 NOTE — ED ADULT NURSE NOTE - NSFALLUNIVINTERV_ED_ALL_ED
Bed/Stretcher in lowest position, wheels locked, appropriate side rails in place/Call bell, personal items and telephone in reach/Instruct patient to call for assistance before getting out of bed/chair/stretcher/Non-slip footwear applied when patient is off stretcher/[place] to call system/Physically safe environment - no spills, clutter or unnecessary equipment/Purposeful proactive rounding/Room/bathroom lighting operational, light cord in reach

## 2023-06-23 NOTE — ED PROVIDER NOTE - NSICDXPASTSURGICALHX_GEN_ALL_CORE_FT
PAST SURGICAL HISTORY:  H/O knee surgery     History of cataract surgery     Other specified personal history presenting hazards to health History of bowel resection

## 2023-06-23 NOTE — ED PROVIDER NOTE - CLINICAL SUMMARY MEDICAL DECISION MAKING FREE TEXT BOX
Pain is improved now, thinks likely partial obstruction. Declines pain meds for now.   surgery consulted and to see patient

## 2023-06-23 NOTE — H&P ADULT - HISTORY OF PRESENT ILLNESS
78M w/ PMHx of Crohn's, HTN, HLD, AFib/Flutter s/p multiple DCCV (on Amio), Gout, PSHx R IHR, ileocecectomy, open appy presents for abdominal pain since last night. Pt states his pain is constant, severe pain diffusely. He states he has gotten this pain previously, though not as intensely that he has managed at home. Last BM and flatus was today. Denies n/v. Denies f/c. Denies cp/sob. Denies urinary complaints. Denies PO intake today. Pt was sent for a CT scan as an outpatient that showed distended loops of bowel with a possible transition point in the RLQ w/ fecalization of bowel contents.    In the ED, pt was afebrile, nontachycardic, hypertensive, and satting well on RA. Labs significant for WBC 11.77. Hgb 13.0. LA 1.4.    PMHx: HTN, Atrial fibrillation s/p multiple DCCV, Crohn's disease, HLD, Hypothyroidism  PSHx: Ileocecectomy 30 years ago, open appy, H/O knee surgery, History of cataract surgery  Family Hx: Denies family hx of IBS, Crohn's, UC, or colon cancer.  Last Cscope: 6 months ago wnl  Last EGD: 6 months ago wnl  All: penicillin (Angioedema)    Meds: allopurinol 300 mg oral tablet: 1 tab(s) orally once a day  amiodarone 200 mg oral tablet: 1 orally once a day  metoprolol tartrate 50 mg oral tablet: 1 orally 2 times a day  rosuvastatin 5 mg oral tablet: 1 tab(s) orally once a day  Synthroid 50 mcg (0.05 mg) oral tablet: 1 tab(s) orally once a day  venlafaxine 150 mg oral tablet, extended release: 1 tab(s) orally 2 times a day      Vitals past 24h:  T(F): 97.6 (13:29), Max: 97.6 (13:29)  HR: 98 (13:29) (98 - 98)  BP: 194/72 (13:29) (194/72 - 194/72)  RR: 20 (13:29) (20 - 20)  SpO2: 98% (13:29) (98% - 98%)

## 2023-06-24 NOTE — CONSULT NOTE ADULT - ASSESSMENT
SBO ? etio stricture vs adhesions, acute presentation unlikely IBD but he did present with acuteness in 1987  he is on no maintenance IBD meds because subsequent scopes showed stricture but no IBD  agree with current treatment  NPO, IV hydrate, NGT to suction  follow clinical course

## 2023-06-24 NOTE — PROGRESS NOTE ADULT - ASSESSMENT
SBO in setting of h/o Crohn's disease, more likely adhesive in origin rather than Crohn's flare (patient off all Crohn's therapy last 3-4 years).  Patient feels better since NGT inserted, and denies abdominal pain today.  NGT output still significant and thick.  Will continue NGT drainage, NPO, IVF.  Asking Dr. Yuan from GI to see today to weigh in.

## 2023-06-24 NOTE — CONSULT NOTE ADULT - SUBJECTIVE AND OBJECTIVE BOX
78M w/ PMHx of Crohn's (presented as acute GI bleed in 1987 and had emergency surgery at [facility] path showed "granulomatous ileitis) gets surveillance scopes by Dr Melo frequently because od dysplasia and stricture last scoped 1 year ago  HTN, HLD, AFib/Flutter s/p multiple DCCV (on Amio), Gout, PSHx R IHR,, open appendectomy Has had intermittent abdominal pain for a while which would spontaneously resolve presents for abdominal pain since few days ago. Was supposed to fly to [place] yesterdayu but because of severe pain saw Dr Aguiar.  Pt was sent for a CT scan as an outpatient that showed distended loops of bowel with a possible transition point in the RLQ w/ fecalization of bowel contents.    In the ED, pt was afebrile, nontachycardic, hypertensive, and satting well on RA. Labs significant for WBC 11.77. Hgb 13.0. LA 1.4.    PMHx: HTN, Atrial fibrillation s/p multiple DCCV, Crohn's disease, HLD, Hypothyroidism  PSHx: Ileocecectomy 30 years ago, open appy, H/O knee surgery, History of cataract surgery  Family Hx: Denies family hx of IBS, Crohn's, UC, or colon cancer.  Last Cscope: 1 year ago wnl  Last EGD: 1 year ago wnl  All: penicillin (Angioedema)    Meds: allopurinol 300 mg oral tablet: 1 tab(s) orally once a day  amiodarone 200 mg oral tablet: 1 orally once a day  metoprolol tartrate 50 mg oral tablet: 1 orally 2 times a day  rosuvastatin 5 mg oral tablet: 1 tab(s) orally once a day  Synthroid 50 mcg (0.05 mg) oral tablet: 1 tab(s) orally once a day  venlafaxine 150 mg oral tablet, extended release: 1 tab(s) orally 2 times a day      Vitals past 24h:  T(F): 97.6 (13:29), Max: 97.6 (13:29)  HR: 98 (13:29) (98 - 98)  BP: 194/72 (13:29) (194/72 - 194/72)  RR: 20 (13:29) (20 - 20)  SpO2: 98% (13:29) (98% - 98%)   (23 Jun 2023 16:52)      REVIEW OF SYSTEMS:  Constitutional: No fever, weight loss or fatigue  ENMT:  No difficulty hearing, tinnitus, vertigo; No sinus or throat pain  Respiratory: No cough, wheezing, chills or hemoptysis  Cardiovascular: No chest pain, palpitations, dizziness or leg swelling  Gastrointestinal: (since NGT in) No abdominal or epigastric pain. No nausea, vomiting or hematemesis; No diarrhea or constipation. No melena or hematochezia.  Skin: No itching, burning, rashes or lesions   Musculoskeletal: No joint pain or swelling; No muscle, back or extremity pain    PAST MEDICAL & SURGICAL HISTORY:  Essential hypertension  Hypertension      Atrial fibrillation  s/p cardioversion 2010 and 2014  Pt. reports 4 DCCV  Now on Amiodarone 200mg PO bid and Eliquis 5mg PO bid  Last DCCV 4 yrs ago at [facility]      Crohn's disease  s/p partial resection of ileum      Hyperlipidemia      Hypothyroidism      History of depression  On Venlafaxine ER 150mg PO bid      H/O knee surgery      History of cataract surgery          FAMILY HISTORY:      SOCIAL HISTORY:  Smoking Status: [ ] Current, [ ] Former, [ ] Never  Pack Years:    MEDICATIONS:  MEDICATIONS  (STANDING):  aMIOdarone Infusion 0.5 mG/Min (16.7 mL/Hr) IV Continuous <Continuous>  dextrose 5% + sodium chloride 0.45%. 1000 milliLiter(s) (130 mL/Hr) IV Continuous <Continuous>  heparin   Injectable 5000 Unit(s) SubCutaneous every 8 hours  metoprolol tartrate Injectable 5 milliGRAM(s) IV Push every 6 hours  pantoprazole  Injectable 40 milliGRAM(s) IV Push daily    MEDICATIONS  (PRN):  benzocaine/menthol Lozenge 1 Lozenge Oral every 2 hours PRN Sore Throat  ondansetron Injectable 4 milliGRAM(s) IV Push every 6 hours PRN Nausea      Allergies    penicillin (Angioedema)    Intolerances        Vital Signs Last 24 Hrs  T(C): 36.6 (24 Jun 2023 14:00), Max: 36.8 (24 Jun 2023 04:51)  T(F): 97.9 (24 Jun 2023 14:00), Max: 98.3 (24 Jun 2023 09:00)  HR: 60 (24 Jun 2023 12:55) (50 - 72)  BP: 161/71 (24 Jun 2023 12:55) (132/63 - 224/69)  BP(mean): 102 (24 Jun 2023 12:55) (95 - 130)  RR: 18 (24 Jun 2023 12:55) (17 - 19)  SpO2: 92% (24 Jun 2023 12:55) (91% - 96%)    Parameters below as of 24 Jun 2023 12:55  Patient On (Oxygen Delivery Method): room air        06-23 @ 07:01 - 06-24 @ 07:00  --------------------------------------------------------  IN: 1613.7 mL / OUT: 1150 mL / NET: 463.7 mL    06-24 @ 07:01 - 06-24 @ 14:47  --------------------------------------------------------  IN: 733.5 mL / OUT: 900 mL / NET: -166.5 mL          PHYSICAL EXAM:    General: Well developed; well nourished; in no acute distress  HEENT: MMM, conjunctiva and sclera clear, NGT in with brownish fecal like discharge  Gastrointestinal: Soft, non-tender non-distended; Normal bowel sounds; No rebound or guarding  Extremities: Normal range of motion, No clubbing, cyanosis or edema  Neurological: Alert and oriented x3  Skin: Warm and dry. No obvious rash      LABS:                        13.2   9.39  )-----------( 199      ( 24 Jun 2023 05:30 )             40.8     06-24    134<L>  |  100  |  10  ----------------------------<  128<H>  4.1   |  24  |  0.96    Ca    8.6      24 Jun 2023 05:30  Phos  3.0     06-24  Mg     1.9     06-24    TPro  8.0  /  Alb  4.0  /  TBili  0.8  /  DBili  x   /  AST  26  /  ALT  16  /  AlkPhos  88  06-23          RADIOLOGY & ADDITIONAL STUDIES:

## 2023-06-24 NOTE — PROGRESS NOTE ADULT - SUBJECTIVE AND OBJECTIVE BOX
SUBJECTIVE:   Patient seen and examined on am rounds. No new complaints. npo. slept poorly. -n-v-cp-sob.    Vital Signs Last 24 Hrs  T(C): 36.8 (24 Jun 2023 09:00), Max: 36.8 (24 Jun 2023 04:51)  T(F): 98.3 (24 Jun 2023 09:00), Max: 98.3 (24 Jun 2023 09:00)  HR: 58 (24 Jun 2023 08:40) (50 - 98)  BP: 193/79 (24 Jun 2023 08:40) (132/63 - 224/69)  BP(mean): 114 (24 Jun 2023 08:40) (103 - 122)  RR: 19 (24 Jun 2023 08:40) (17 - 20)  SpO2: 94% (24 Jun 2023 08:40) (91% - 98%)    Parameters below as of 24 Jun 2023 08:40  Patient On (Oxygen Delivery Method): room air        I&O's Summary    23 Jun 2023 07:01  -  24 Jun 2023 07:00  --------------------------------------------------------  IN: 1613.7 mL / OUT: 1150 mL / NET: 463.7 mL    24 Jun 2023 07:01  -  24 Jun 2023 10:51  --------------------------------------------------------  IN: 440.1 mL / OUT: 50 mL / NET: 390.1 mL        Physical Exam:  General Appearance: Appears well, NAD  Pulmonary: Nonlabored breathing, no respiratory distress  Cardiovascular: NSR  Abdomen: Soft, nondistended, nontender. ngt draining large volume thick gastric contents.  Extremities: WWP, SCD's in place     LABS:                        13.2   9.39  )-----------( 199      ( 24 Jun 2023 05:30 )             40.8     06-24    134<L>  |  100  |  10  ----------------------------<  128<H>  4.1   |  24  |  0.96    Ca    8.6      24 Jun 2023 05:30  Phos  3.0     06-24  Mg     1.9     06-24    TPro  8.0  /  Alb  4.0  /  TBili  0.8  /  DBili  x   /  AST  26  /  ALT  16  /  AlkPhos  88  06-23    PT/INR - ( 23 Jun 2023 14:16 )   PT: 12.5 sec;   INR: 1.05          PTT - ( 23 Jun 2023 14:16 )  PTT:27.1 sec  Urinalysis Basic - ( 24 Jun 2023 05:30 )    Color: x / Appearance: x / SG: x / pH: x  Gluc: 128 mg/dL / Ketone: x  / Bili: x / Urobili: x   Blood: x / Protein: x / Nitrite: x   Leuk Esterase: x / RBC: x / WBC x   Sq Epi: x / Non Sq Epi: x / Bacteria: x

## 2023-06-24 NOTE — PROGRESS NOTE ADULT - ASSESSMENT
78M w/ Crohn's, AFib/Flutter s/p DCCVs, remote ileocectomy and open appy here for resolving SBO vs Crohns flare, s/p NGT decompression.     NPO/IVF  NGT to LIWS  Pain/nausea PRN  Amio GTT  OOBA/IS  SCDs/SQH  TATE  AM labs

## 2023-06-25 NOTE — PROGRESS NOTE ADULT - SUBJECTIVE AND OBJECTIVE BOX
SUBJECTIVE: Pt seen and examined by chief resident. Pt is doing well, resting comfortably on bed. Reports feeling better overall. No pain. -F/-BM. No nausea or vomiting. No complaints at this time.    Vital Signs Last 24 Hrs  T(C): 37.7 (25 Jun 2023 09:13), Max: 37.7 (25 Jun 2023 09:13)  T(F): 99.8 (25 Jun 2023 09:13), Max: 99.8 (25 Jun 2023 09:13)  HR: 68 (25 Jun 2023 09:12) (58 - 78)  BP: 189/91 (25 Jun 2023 09:12) (161/71 - 189/91)  BP(mean): 130 (25 Jun 2023 09:12) (102 - 138)  RR: 17 (25 Jun 2023 09:12) (17 - 19)  SpO2: 94% (25 Jun 2023 09:12) (91% - 99%)    Parameters below as of 25 Jun 2023 09:12  Patient On (Oxygen Delivery Method): room air        I&O's Summary    24 Jun 2023 07:01  -  25 Jun 2023 07:00  --------------------------------------------------------  IN: 3227.4 mL / OUT: 2750 mL / NET: 477.4 mL        Physical Exam:  General Appearance: Appears well, NAD  Pulmonary: Nonlabored breathing, no respiratory distress  Cardiovascular: NSR  Abdomen: Soft, distention improved, nontender  Extremities: WWP, SCD's in place     LABS:                        12.8   4.31  )-----------( 205      ( 25 Jun 2023 08:32 )             39.6     06-25    139  |  105  |  12  ----------------------------<  109<H>  3.6   |  24  |  1.11    Ca    8.4      25 Jun 2023 08:32  Phos  2.2     06-25  Mg     1.8     06-25    TPro  8.0  /  Alb  4.0  /  TBili  0.8  /  DBili  x   /  AST  26  /  ALT  16  /  AlkPhos  88  06-23    PT/INR - ( 23 Jun 2023 14:16 )   PT: 12.5 sec;   INR: 1.05          PTT - ( 23 Jun 2023 14:16 )  PTT:27.1 sec  Urinalysis Basic - ( 25 Jun 2023 08:32 )    Color: x / Appearance: x / SG: x / pH: x  Gluc: 109 mg/dL / Ketone: x  / Bili: x / Urobili: x   Blood: x / Protein: x / Nitrite: x   Leuk Esterase: x / RBC: x / WBC x   Sq Epi: x / Non Sq Epi: x / Bacteria: x

## 2023-06-25 NOTE — PATIENT PROFILE ADULT - FALL HARM RISK - UNIVERSAL INTERVENTIONS
Bed in lowest position, wheels locked, appropriate side rails in place/Call bell, personal items and telephone in reach/Instruct patient to call for assistance before getting out of bed or chair/Non-slip footwear when patient is out of bed/[place] to call system/Physically safe environment - no spills, clutter or unnecessary equipment/Purposeful Proactive Rounding/Room/bathroom lighting operational, light cord in reach

## 2023-06-25 NOTE — PROGRESS NOTE ADULT - SUBJECTIVE AND OBJECTIVE BOX
Pt seen and examined   no new complaints  feels better  no BMs no fdlatus    REVIEW OF SYSTEMS:  Constitutional: No fever, weight los  Cardiovascular: No chest pain, palpitations, dizziness  Gastrointestinal: No abdominal or epigastric pain. No nausea, vomiting No BM  Skin: No itching, burning, rashes or lesions       MEDICATIONS:  MEDICATIONS  (STANDING):  aMIOdarone Infusion 0.5 mG/Min (16.7 mL/Hr) IV Continuous <Continuous>  dextrose 5% + sodium chloride 0.45% 1000 milliLiter(s) (130 mL/Hr) IV Continuous <Continuous>  heparin   Injectable 5000 Unit(s) SubCutaneous every 8 hours  metoprolol tartrate Injectable 5 milliGRAM(s) IV Push every 6 hours  pantoprazole  Injectable 40 milliGRAM(s) IV Push daily    MEDICATIONS  (PRN):  benzocaine 20% Spray 1 Spray(s) Topical every 6 hours PRN ngt discomfort  benzocaine/menthol Lozenge 1 Lozenge Oral every 2 hours PRN Sore Throat  ondansetron Injectable 4 milliGRAM(s) IV Push every 6 hours PRN Nausea      Allergies    penicillin (Angioedema)    Intolerances        Vital Signs Last 24 Hrs  T(C): 37.5 (25 Jun 2023 13:10), Max: 37.7 (25 Jun 2023 09:13)  T(F): 99.5 (25 Jun 2023 13:10), Max: 99.8 (25 Jun 2023 09:13)  HR: 72 (25 Jun 2023 11:30) (58 - 82)  BP: 139/60 (25 Jun 2023 11:30) (139/60 - 189/91)  BP(mean): 87 (25 Jun 2023 11:30) (87 - 138)  RR: 17 (25 Jun 2023 11:30) (17 - 19)  SpO2: 94% (25 Jun 2023 11:30) (92% - 99%)    Parameters below as of 25 Jun 2023 11:30  Patient On (Oxygen Delivery Method): room air        06-24 @ 07:01  -  06-25 @ 07:00  --------------------------------------------------------  IN: 3227.4 mL / OUT: 2750 mL / NET: 477.4 mL        PHYSICAL EXAM:    General:  in no acute distress  HEENT: MMM, conjunctiva and sclera clear  Gastrointestinal: Soft non-tender non-distended; Normal bowel sounds; tympany all over  Skin: Warm and dry. No obvious rash    LABS:      CBC Full  -  ( 25 Jun 2023 08:32 )  WBC Count : 4.31 K/uL  RBC Count : 3.99 M/uL  Hemoglobin : 12.8 g/dL  Hematocrit : 39.6 %  Platelet Count - Automated : 205 K/uL  Mean Cell Volume : 99.2 fl  Mean Cell Hemoglobin : 32.1 pg  Mean Cell Hemoglobin Concentration : 32.3 gm/dL  Auto Neutrophil # : x  Auto Lymphocyte # : x  Auto Monocyte # : x  Auto Eosinophil # : x  Auto Basophil # : x  Auto Neutrophil % : x  Auto Lymphocyte % : x  Auto Monocyte % : x  Auto Eosinophil % : x  Auto Basophil % : x    06-25    139  |  105  |  12  ----------------------------<  109<H>  3.6   |  24  |  1.11    Ca    8.4      25 Jun 2023 08:32  Phos  2.2     06-25  Mg     1.8     06-25            Urinalysis Basic - ( 25 Jun 2023 08:32 )    Color: x / Appearance: x / SG: x / pH: x  Gluc: 109 mg/dL / Ketone: x  / Bili: x / Urobili: x   Blood: x / Protein: x / Nitrite: x   Leuk Esterase: x / RBC: x / WBC x   Sq Epi: x / Non Sq Epi: x / Bacteria: x                RADIOLOGY & ADDITIONAL STUDIES (The following images were personally reviewed):

## 2023-06-25 NOTE — PROGRESS NOTE ADULT - SUBJECTIVE AND OBJECTIVE BOX
No pain No gas  Voiding well BP still increased Afeb In RR Clear chest Abd nondistended Absent BS No edema

## 2023-06-25 NOTE — PROGRESS NOTE ADULT - ASSESSMENT
HD#2 with SBO in setting of patient with Crohn's disease (on no therapy for several years), h/o small bowel stricture, dilated in past.  NGT drained 1600cc/24h, and the drainage is a little thinner than yesterday, but patient has had no flatus.  He denies any abdominal pain.  Abdomen remains soft with no guarding or peritoneal signs.  Appreciate Dr. Yuan's consult yesterday, and he agrees that this presentation is more consistent with acute SBO due to adhesive disease or chronic stricture rather than an acute Crohn's flare, so we are holding for now on direct IBD directed therapy.  Will continue conservative management for now, but frequently reasses for changes in abdominal exam.  Patient may need exploration if there are no signs of resolution in the next 24h.  D/W patient who understands.  I will discuss with Dr. Peterson when he returns tomorrow AM if there are no acute changes before then.

## 2023-06-25 NOTE — CHART NOTE - NSCHARTNOTEFT_GEN_A_CORE
Subjective:  Patient seen and examined bedside. He has no acute complaints and reports feeling better. He states that he had 1 episode of flatus, still no BM. Currently his BP is 203, being treated. He is asymptomatic and the rest of his vitals are within normal limits. Denies cp, sob, palpitations, dizziness, nausea, emesis, or fevers.     Objective:  Gen: NAD, resting comfortably in bed   ENT: NGT in place with light brown output after being flushed  Pulm: Nonlabored breathing, no respiratory distress   C/V: NSR   Abd: soft, mildly distended, non-tender  Extrem: WWP, no calf edema or tenderness      Assessment and Plan:  78M w/ Crohn's, AFib/Flutter s/p DCCVs, remote ileocectomy and open appy here for resolving SBO vs Crohns flare, s/p NGT decompression.    NPO/IVF  AROBF  NGT to LIWS  Pain/nausea control PRN  Amio GTT  OOBA/IS  SCDs/SQH  TATE - next 6/26 AM  AM labs

## 2023-06-25 NOTE — CHART NOTE - NSCHARTNOTEFT_GEN_A_CORE
Patient seen and examined bedside.    Reports feeling better, less bloated. out of bed. no flatus or bowel function.       Vital Signs Last 24 Hrs  T(C): 36.7 (25 Jun 2023 18:20), Max: 37.7 (25 Jun 2023 09:13)  T(F): 98 (25 Jun 2023 18:20), Max: 99.8 (25 Jun 2023 09:13)  HR: 62 (25 Jun 2023 16:40) (60 - 82)  BP: 164/71 (25 Jun 2023 16:40) (139/60 - 189/91)  BP(mean): 102 (25 Jun 2023 16:40) (87 - 138)  RR: 17 (25 Jun 2023 16:40) (17 - 19)  SpO2: 98% (25 Jun 2023 16:40) (94% - 99%)    Parameters below as of 25 Jun 2023 16:40  Patient On (Oxygen Delivery Method): room air        Abdomen soft, less distended, nontender.    NGT in place with light bilious output.    Will continue to monitor.

## 2023-06-26 NOTE — PROGRESS NOTE ADULT - SUBJECTIVE AND OBJECTIVE BOX
1 episode of flatus yesterday  AVSS  abd softly distended  nontender  labs OK  NGT output decreasing    will need to explore in next <24 hours if return of bowel function  discussed with other consultants  laparoscopy/possible laparotomy  TRE vs resection

## 2023-06-26 NOTE — PROGRESS NOTE ADULT - SUBJECTIVE AND OBJECTIVE BOX
No abdominal pain but no gas  BP high Afeb In RR Good BS ant Abd remains nondistended with minimal BS

## 2023-06-26 NOTE — PROGRESS NOTE ADULT - SUBJECTIVE AND OBJECTIVE BOX
General Surgery Progress Note    SUBJECTIVE:   Patient seen and examined at bedside by chief during AM rounds. Patient noted to be persistently hypertensive overnight. Reports he passed a small amount of flatus o/n. Denies bowel movement since surgery. Denies nausea, emesis, fevers, chills. Reports he has been OOB minimally.     Vital Signs Last 24 Hrs  T(C): 37 (26 Jun 2023 04:47), Max: 37.7 (25 Jun 2023 09:13)  T(F): 98.6 (26 Jun 2023 04:47), Max: 99.8 (25 Jun 2023 09:13)  HR: 64 (26 Jun 2023 05:25) (60 - 82)  BP: 159/70 (26 Jun 2023 05:25) (139/60 - 212/86)  BP(mean): 100 (26 Jun 2023 05:25) (87 - 130)  RR: 18 (26 Jun 2023 05:25) (17 - 19)  SpO2: 91% (26 Jun 2023 05:25) (91% - 99%)    Parameters below as of 26 Jun 2023 05:25  Patient On (Oxygen Delivery Method): room air        I&O's Summary    25 Jun 2023 07:01  -  26 Jun 2023 07:00  --------------------------------------------------------  IN: 1470.6 mL / OUT: 1600 mL / NET: -129.4 mL        Physical Exam:  General: Resting comfortably in bed, NAD  HEENT: ATNC, NGT in nares with feculent output  Pulmonary: Nonlabored breathing, no respiratory distress, no acessory muscle use noted  Cardiovascular: NSR  Abdomen: Soft, mild distention, nontender. No rebound, no guarding  Extremities: WWP, SCDs in plac    LABS:                        12.8   4.31  )-----------( 205      ( 25 Jun 2023 08:32 )             39.6     06-25    139  |  105  |  12  ----------------------------<  109<H>  3.6   |  24  |  1.11    Ca    8.4      25 Jun 2023 08:32  Phos  2.2     06-25  Mg     1.8     06-25        Urinalysis Basic - ( 25 Jun 2023 08:32 )    Color: x / Appearance: x / SG: x / pH: x  Gluc: 109 mg/dL / Ketone: x  / Bili: x / Urobili: x   Blood: x / Protein: x / Nitrite: x   Leuk Esterase: x / RBC: x / WBC x   Sq Epi: x / Non Sq Epi: x / Bacteria: x

## 2023-06-26 NOTE — PROGRESS NOTE ADULT - SUBJECTIVE AND OBJECTIVE BOX
Pt seen and examined   no complaints  no BM  NGT output feculent stll    REVIEW OF SYSTEMS:  Constitutional: No fever,  Cardiovascular: No chest pain, palpitations, dizziness  Gastrointestinal: No abdominal or epigastric pain. No nausea, vomiting   Skin: No itching, burning, rashes or lesions       MEDICATIONS:  MEDICATIONS  (STANDING):  aMIOdarone Infusion 0.5 mG/Min (16.7 mL/Hr) IV Continuous <Continuous>  dextrose 5% + sodium chloride 0.45% 1000 milliLiter(s) (130 mL/Hr) IV Continuous <Continuous>  heparin   Injectable 5000 Unit(s) SubCutaneous every 8 hours  metoprolol tartrate Injectable 10 milliGRAM(s) IV Push every 6 hours  pantoprazole  Injectable 40 milliGRAM(s) IV Push daily  sodium phosphate 15 milliMole(s)/250 mL IVPB 15 milliMole(s) IV Intermittent once    MEDICATIONS  (PRN):  benzocaine 20% Spray 1 Spray(s) Topical every 6 hours PRN ngt discomfort  benzocaine/menthol Lozenge 1 Lozenge Oral every 2 hours PRN Sore Throat  ondansetron Injectable 4 milliGRAM(s) IV Push every 6 hours PRN Nausea      Allergies    penicillin (Angioedema)    Intolerances        Vital Signs Last 24 Hrs  T(C): 37.2 (26 Jun 2023 08:30), Max: 37.5 (25 Jun 2023 13:10)  T(F): 99 (26 Jun 2023 08:30), Max: 99.5 (25 Jun 2023 13:10)  HR: 66 (26 Jun 2023 09:58) (60 - 72)  BP: 200/80 (26 Jun 2023 09:58) (139/60 - 212/86)  BP(mean): 115 (26 Jun 2023 09:58) (87 - 123)  RR: 19 (26 Jun 2023 09:58) (17 - 19)  SpO2: 94% (26 Jun 2023 09:58) (91% - 98%)    Parameters below as of 26 Jun 2023 09:58  Patient On (Oxygen Delivery Method): room air        06-25 @ 07:01  -  06-26 @ 07:00  --------------------------------------------------------  IN: 2204.1 mL / OUT: 1900 mL / NET: 304.1 mL    06-26 @ 07:01  -  06-26 @ 10:08  --------------------------------------------------------  IN: 146.7 mL / OUT: 250 mL / NET: -103.3 mL        PHYSICAL EXAM:    General:  in no acute distress, NGT  HEENT: MMM, conjunctiva and sclera clear  Gastrointestinal: Soft non-tender sl-distended; Normal bowel sounds;    Skin: Warm and dry. No obvious rash    LABS:      CBC Full  -  ( 26 Jun 2023 06:58 )  WBC Count : 3.13 K/uL  RBC Count : 3.88 M/uL  Hemoglobin : 12.5 g/dL  Hematocrit : 39.8 %  Platelet Count - Automated : 159 K/uL  Mean Cell Volume : 102.6 fl  Mean Cell Hemoglobin : 32.2 pg  Mean Cell Hemoglobin Concentration : 31.4 gm/dL  Auto Neutrophil # : x  Auto Lymphocyte # : x  Auto Monocyte # : x  Auto Eosinophil # : x  Auto Basophil # : x  Auto Neutrophil % : x  Auto Lymphocyte % : x  Auto Monocyte % : x  Auto Eosinophil % : x  Auto Basophil % : x    06-26    134<L>  |  104  |  13  ----------------------------<  113<H>  3.9   |  22  |  1.26    Ca    8.0<L>      26 Jun 2023 06:58  Phos  2.3     06-26  Mg     2.2     06-26      PT/INR - ( 26 Jun 2023 06:58 )   PT: 15.1 sec;   INR: 1.27          PTT - ( 26 Jun 2023 06:58 )  PTT:27.7 sec      Urinalysis Basic - ( 26 Jun 2023 06:58 )    Color: x / Appearance: x / SG: x / pH: x  Gluc: 113 mg/dL / Ketone: x  / Bili: x / Urobili: x   Blood: x / Protein: x / Nitrite: x   Leuk Esterase: x / RBC: x / WBC x   Sq Epi: x / Non Sq Epi: x / Bacteria: x                RADIOLOGY & ADDITIONAL STUDIES (The following images were personally reviewed):

## 2023-06-26 NOTE — PROGRESS NOTE ADULT - ASSESSMENT
78M w/ Crohn's, AFib/Flutter s/p DCCVs, remote ileocectomy and open appy here for resolving SBO vs Crohns flare, s/p NGT decompression. NGT still with high feculent output    NPO/IVF  NGT to LIWS  Pain/nausea PRN  Amio GTT  OOBA/IS  SCDs/SQH  TATE  Monitor for ROBF  AM labs 78M w/ Crohn's, AFib/Flutter s/p DCCVs, remote ileocectomy and open appy here for resolving SBO vs Crohns flare, s/p NGT decompression. NGT still with high feculent output    NPO/IVF  NGT to LIWS  Pain/nausea PRN  Amio GTT  OOBA/IS  SCDs/SQH  TATE  Monitor for ROBF vs. OR today for diagnostic laparoscopy   AM labs 78M w/ Crohn's, Chronic AFib/Flutter s/p DCCVs, remote ileocectomy and open appy here for resolving SBO vs Crohns flare, s/p NGT decompression. NGT still with high feculent output    NPO/IVF  NGT to LIWS  Pain/nausea PRN  Amio GTT  OOBA/IS  SCDs/SQH  TATE  Monitor for ROBF vs. OR today for diagnostic laparoscopy   AM labs

## 2023-06-27 NOTE — DISCHARGE NOTE PROVIDER - HOSPITAL COURSE
78M w/ Crohn's, AFib/Flutter s/p DCCVs, remote ileocectomy and open appy, presented to [facility] with abdominal pain, outpatient CT concerning for dilated loops of small bowel and stomach, admitted for resolving SBO vs Crohn's flare, placed a nasogastric tube for decompression, plan for nonoperative management with serial abdominal exams and close hemodynamic monitoring. Course significant for uncontrolled hypertension, medicine team followed throughout their hospital stay, antihypertensives given accordingly. Given no progression of bowel function, decision was made to take patient to the OR on 6/26 for laparoscopic lysis of adhesions, converted to open lysis of adhesions, SBR x 3 and left in discontinuity, temporary abdominal closure. Transferred to SICU post op for hemodynamic monitoring, remained intubated.          78M w/ Crohn's, AFib/Flutter s/p DCCVs, remote ileocectomy and open appy, presented to [facility] with abdominal pain, outpatient CT concerning for dilated loops of small bowel and stomach, admitted for resolving SBO vs Crohn's flare, placed a nasogastric tube for decompression, plan for nonoperative management with serial abdominal exams and close hemodynamic monitoring. Course significant for uncontrolled hypertension, medicine team followed throughout their hospital stay, antihypertensives given accordingly. Given no progression of bowel function, decision was made to take patient to the OR on 6/26 for laparoscopic lysis of adhesions, converted to open lysis of adhesions, SBR x 3 and left in discontinuity, temporary abdominal closure. Transferred to SICU post op for hemodynamic monitoring, remained intubated.          78M w/ Crohn's, AFib/Flutter s/p DCCVs, remote ileocectomy and open appy, presented to [facility] with abdominal pain, outpatient CT concerning for dilated loops of small bowel and stomach, admitted for resolving SBO vs Crohn's flare, placed a nasogastric tube for decompression, plan for nonoperative management with serial abdominal exams and close hemodynamic monitoring. Course significant for uncontrolled hypertension, medicine team followed throughout their hospital stay, antihypertensives given accordingly. Given no progression of bowel function, decision was made to take patient to the OR on 6/26 for laparoscopic lysis of adhesions, converted to open lysis of adhesions, SBR x 3 and left in discontinuity, temporary abdominal closure. Transferred to SICU post op for hemodynamic monitoring, remained intubated.          78M w/ Crohn's, AFib/Flutter s/p DCCVs, remote ileocectomy and open appy, presented to [facility] with abdominal pain, outpatient CT concerning for dilated loops of small bowel and stomach, admitted for resolving SBO vs Crohn's flare, placed a nasogastric tube for decompression, plan for nonoperative management with serial abdominal exams and close hemodynamic monitoring. Course significant for uncontrolled hypertension, medicine team followed throughout their hospital stay, antihypertensives given accordingly. Given no progression of bowel function, decision was made to take patient to the OR on 6/26 for laparoscopic lysis of adhesions, converted to open lysis of adhesions, SBR x 3 and left in discontinuity, temporary abdominal closure. Transferred to SICU post op for hemodynamic monitoring, remained intubated.       ***LAST UPDATED 6/27***         78M w/ Crohn's, AFib/Flutter s/p DCCVs, remote ileocectomy and open appy, presented to [facility] with abdominal pain, outpatient CT concerning for dilated loops of small bowel and stomach, admitted for resolving SBO vs Crohn's flare, placed a nasogastric tube for decompression, plan for nonoperative management with serial abdominal exams and close hemodynamic monitoring. Course significant for uncontrolled hypertension, medicine team followed throughout their hospital stay, antihypertensives given accordingly. Given no progression of bowel function, decision was made to take patient to the OR on 6/26 for laparoscopic lysis of adhesions, converted to open lysis of adhesions, SBR x 3 and left in discontinuity, temporary abdominal closure. Transferred to SICU post op for hemodynamic monitoring, remained intubated.       ***LAST UPDATED 6/27***         78M w/ Crohn's, AFib/Flutter s/p DCCVs, remote ileocectomy and open appy, presented to [facility] with abdominal pain, outpatient CT concerning for dilated loops of small bowel and stomach, admitted for resolving SBO vs Crohn's flare, placed a nasogastric tube for decompression, plan for nonoperative management with serial abdominal exams and close hemodynamic monitoring. Course significant for uncontrolled hypertension, medicine team followed throughout their hospital stay, antihypertensives given accordingly. Given no progression of bowel function, decision was made to take patient to the OR on 6/26 for laparoscopic lysis of adhesions, converted to open lysis of adhesions, SBR x 3 and left in discontinuity, temporary abdominal closure. Transferred to SICU post op for hemodynamic monitoring, remained intubated.       ***LAST UPDATED 6/27***         78M w/ Crohn's, AFib/Flutter s/p DCCVs, remote ileocectomy and open appy, presented to [facility] with abdominal pain, outpatient CT concerning for dilated loops of small bowel and stomach, admitted for resolving SBO vs Crohn's flare, placed a nasogastric tube for decompression, plan for nonoperative management with serial abdominal exams and close hemodynamic monitoring. Course significant for uncontrolled hypertension, medicine team followed throughout their hospital stay, antihypertensives given accordingly. Given no progression of bowel function, decision was made to take patient to the OR on 6/26 for laparoscopic lysis of adhesions, converted to open lysis of adhesions, SBR x 3 and left in discontinuity, temporary abdominal closure. Transferred to SICU post op for hemodynamic monitoring, remained intubated. Course complicated by right sided pneumothorax, thoracic surgery was consulted, placed chest tube on 6/27. Patient returned to the OR on 6/28 for ex lap, removal of abthera, ileocolic resection, small bowel anastamosis, abdominal wall closure. Post operative hemoglobin downtrended to 8.5 from 9, given 1U PRBC. Started on TPN for nutritional optimization and successfully extubated to high flow NC on 6/29. Respiratory status continued to be monitored closely with serial CXRs, chest PT. Course complicated by fevers, altered mental status, fever workup obtained, CXR showing left lung consolidation/effusion, urinalysis negative, blood cultures negative. OR cultures growing enterococcus, infectious disease team consulted, antibiotics adjusted. PICC line was placed on 6/30, no complications. Patient was diuresed as necessary with lasix for fluid overload. Nasogastric tube was removed on 7/1. Wound vac changed 3 times/wk. Uptrending leukocytosis, CT chest AP was obtained on 7/2 showing small bowel malrotation, early/partial bowel obstruction vs post op ileus, small bilateral pleural effusions w/ bibasilar atelectasis. Transfused 1U PRBC for hemoglobin 7.7, responded appropriately. Diet was advanced as tolerated and per return of bowel function. Chest tube was removed on 7/3, repeat CXR with significant improvement. Patient was taken by interventional radiology team for perihepatic aspiration of serous fluid on 7/3.......          ***LAST UPDATED 7/5***     78M w/ Crohn's, AFib/Flutter s/p DCCVs, remote ileocectomy and open appy, presented to [facility] with abdominal pain, outpatient CT concerning for dilated loops of small bowel and stomach, admitted for resolving SBO vs Crohn's flare, placed a nasogastric tube for decompression, plan for nonoperative management with serial abdominal exams and close hemodynamic monitoring. Course significant for uncontrolled hypertension, medicine team followed throughout their hospital stay, antihypertensives given accordingly. Given no progression of bowel function, decision was made to take patient to the OR on 6/26 for laparoscopic lysis of adhesions, converted to open lysis of adhesions, SBR x 3 and left in discontinuity, temporary abdominal closure. Transferred to SICU post op for hemodynamic monitoring, remained intubated. Course complicated by right sided pneumothorax, thoracic surgery was consulted, placed chest tube on 6/27. Patient returned to the OR on 6/28 for ex lap, removal of abthera, ileocolic resection, small bowel anastamosis, abdominal wall closure. Post operative hemoglobin downtrended to 8.5 from 9, given 1U PRBC. Started on TPN for nutritional optimization and successfully extubated to high flow NC on 6/29. Respiratory status continued to be monitored closely with serial CXRs, chest PT. Course complicated by fevers, altered mental status, fever workup obtained, CXR showing left lung consolidation/effusion, urinalysis negative, blood cultures negative. OR cultures growing enterococcus, infectious disease team consulted, antibiotics adjusted. PICC line was placed on 6/30, no complications. Patient was diuresed as necessary with lasix for fluid overload. Nasogastric tube was removed on 7/1. Wound vac changed 3 times/wk. Uptrending leukocytosis, CT chest AP was obtained on 7/2 showing small bowel malrotation, early/partial bowel obstruction vs post op ileus, small bilateral pleural effusions w/ bibasilar atelectasis. Transfused 1U PRBC for hemoglobin 7.7, responded appropriately. Diet was advanced as tolerated and per return of bowel function. Chest tube was removed on 7/3, repeat CXR with significant improvement. Patient was taken by interventional radiology team for perihepatic aspiration of serous fluid on 7/3.......          ***LAST UPDATED 7/5***     78M w/ Crohn's, AFib/Flutter s/p DCCVs, remote ileocectomy and open appy, presented to [facility] with abdominal pain, outpatient CT concerning for dilated loops of small bowel and stomach, admitted for resolving SBO vs Crohn's flare, placed a nasogastric tube for decompression, plan for nonoperative management with serial abdominal exams and close hemodynamic monitoring. Course significant for uncontrolled hypertension, medicine team followed throughout their hospital stay, antihypertensives given accordingly. Given no progression of bowel function, decision was made to take patient to the OR on 6/26 for laparoscopic lysis of adhesions, converted to open lysis of adhesions, SBR x 3 and left in discontinuity, temporary abdominal closure. Transferred to SICU post op for hemodynamic monitoring, remained intubated. Course complicated by right sided pneumothorax, thoracic surgery was consulted, placed chest tube on 6/27. Patient returned to the OR on 6/28 for ex lap, removal of abthera, ileocolic resection, small bowel anastamosis, abdominal wall closure. Post operative hemoglobin downtrended to 8.5 from 9, given 1U PRBC. Started on TPN for nutritional optimization and successfully extubated to high flow NC on 6/29. Respiratory status continued to be monitored closely with serial CXRs, chest PT. Course complicated by fevers, altered mental status, fever workup obtained, CXR showing left lung consolidation/effusion, urinalysis negative, blood cultures negative. OR cultures growing enterococcus, infectious disease team consulted, antibiotics adjusted. PICC line was placed on 6/30, no complications. Patient was diuresed as necessary with lasix for fluid overload. Nasogastric tube was removed on 7/1. Wound vac changed 3 times/wk. Uptrending leukocytosis, CT chest AP was obtained on 7/2 showing small bowel malrotation, early/partial bowel obstruction vs post op ileus, small bilateral pleural effusions w/ bibasilar atelectasis. Transfused 1U PRBC for hemoglobin 7.7, responded appropriately. Diet was advanced as tolerated and per return of bowel function. Chest tube was removed on 7/3, repeat CXR with significant improvement. Patient was taken by interventional radiology team for perihepatic aspiration of serous fluid on 7/3.......          ***LAST UPDATED 7/5***     78M w/ Crohn's, AFib/Flutter s/p DCCVs, remote ileocectomy and open appy, presented to [facility] with abdominal pain, outpatient CT concerning for dilated loops of small bowel and stomach, admitted for resolving SBO vs Crohn's flare, placed a nasogastric tube for decompression, plan for nonoperative management with serial abdominal exams and close hemodynamic monitoring. Course significant for uncontrolled hypertension, medicine team followed throughout their hospital stay, antihypertensives given accordingly. Given no progression of bowel function, decision was made to take patient to the OR on 6/26 for laparoscopic lysis of adhesions, converted to open lysis of adhesions, SBR x 3 and left in discontinuity, temporary abdominal closure. Transferred to SICU post op for hemodynamic monitoring, remained intubated. Course complicated by right sided pneumothorax, thoracic surgery was consulted, placed chest tube on 6/27. Patient returned to the OR on 6/28 for ex lap, removal of abthera, ileocolic resection, small bowel anastamosis, abdominal wall closure. Post operative hemoglobin downtrended to 8.5 from 9, given 1U PRBC. Started on TPN for nutritional optimization and successfully extubated to high flow NC on 6/29. Respiratory status continued to be monitored closely with serial CXRs, chest PT. Course complicated by fevers, altered mental status, fever workup obtained, CXR showing left lung consolidation/effusion, urinalysis negative, blood cultures negative. OR cultures growing enterococcus, infectious disease team consulted, antibiotics adjusted. PICC line was placed on 6/30, no complications. Patient was diuresed as necessary with lasix for fluid overload. Nasogastric tube was removed on 7/1. Wound vac changed 3 times/wk. Uptrending leukocytosis, CT chest AP was obtained on 7/2 showing small bowel malrotation, early/partial bowel obstruction vs post op ileus, small bilateral pleural effusions w/ bibasilar atelectasis. Transfused 1U PRBC for hemoglobin 7.7, responded appropriately. Diet was advanced as tolerated and per return of bowel function. Chest tube was removed on 7/3, repeat CXR with significant improvement. Patient was taken by interventional radiology team for perihepatic aspiration of serous fluid on 7/3. Patient returned to the OR on 7/5 for wound dehisence for exlap, washout, ileocolic resection with end ileostomy, blow hole colostomy, fistula, red rubber from ileostomy to small bowel anastomosis; vicryl bridging mesh; R JOYCE below ileostomy, L JOYCE at small bowel enterotomy repair. SICU for post op monitoring.  Abdominal fluid growing C. tertium/Lactobacillus, antibiotics adjusted per ID team. Course complicated by post op fever, OR cultures growing rare yeast, started fluconazole. Successfully extubated on 7/6. Worked with ostomy nurses. Uncontrolled HTN, medications adjusted accordingly, transitioned to cardene gtt.           ***LAST UPDATED 7/5***       78M w/ Crohn's, AFib/Flutter s/p DCCVs, remote ileocectomy and open appy, presented to [facility] with abdominal pain, outpatient CT concerning for dilated loops of small bowel and stomach, admitted for resolving SBO vs Crohn's flare, placed a nasogastric tube for decompression, plan for nonoperative management with serial abdominal exams and close hemodynamic monitoring. Course significant for uncontrolled hypertension, medicine team followed throughout their hospital stay, antihypertensives given accordingly. Given no progression of bowel function, decision was made to take patient to the OR on 6/26 for laparoscopic lysis of adhesions, converted to open lysis of adhesions, SBR x 3 and left in discontinuity, temporary abdominal closure. Transferred to SICU post op for hemodynamic monitoring, remained intubated. Course complicated by right sided pneumothorax, thoracic surgery was consulted, placed chest tube on 6/27. Patient returned to the OR on 6/28 for ex lap, removal of abthera, ileocolic resection, small bowel anastamosis, abdominal wall closure. Post operative hemoglobin downtrended to 8.5 from 9, given 1U PRBC. Started on TPN for nutritional optimization and successfully extubated to high flow NC on 6/29. Respiratory status continued to be monitored closely with serial CXRs, chest PT. Course complicated by fevers, altered mental status, fever workup obtained, CXR showing left lung consolidation/effusion, urinalysis negative, blood cultures negative. OR cultures growing enterococcus, infectious disease team consulted, antibiotics adjusted. PICC line was placed on 6/30, no complications. Patient was diuresed as necessary with lasix for fluid overload. Nasogastric tube was removed on 7/1. Wound vac changed 3 times/wk. Uptrending leukocytosis, CT chest AP was obtained on 7/2 showing small bowel malrotation, early/partial bowel obstruction vs post op ileus, small bilateral pleural effusions w/ bibasilar atelectasis. Transfused 1U PRBC for hemoglobin 7.7, responded appropriately. Diet was advanced as tolerated and per return of bowel function. Chest tube was removed on 7/3, repeat CXR with significant improvement. Patient was taken by interventional radiology team for perihepatic aspiration of serous fluid on 7/3. Patient returned to the OR on 7/5 for wound dehisence for exlap, washout, ileocolic resection with end ileostomy, blow hole colostomy, fistula, red rubber from ileostomy to small bowel anastomosis; vicryl bridging mesh; R JOYCE below ileostomy, L JOYCE at small bowel enterotomy repair. SICU for post op monitoring.  Abdominal fluid growing C. tertium/Lactobacillus, antibiotics adjusted per ID team. Course complicated by post op fever, OR cultures growing rare yeast, started fluconazole. Successfully extubated on 7/6. Worked with ostomy nurses. Uncontrolled HTN, medications adjusted accordingly, transitioned to cardene gtt.           ***LAST UPDATED 7/5***       78M w/ Crohn's, AFib/Flutter s/p DCCVs, remote ileocectomy and open appy, presented to [facility] with abdominal pain, outpatient CT concerning for dilated loops of small bowel and stomach, admitted for resolving SBO vs Crohn's flare, placed a nasogastric tube for decompression, plan for nonoperative management with serial abdominal exams and close hemodynamic monitoring. Course significant for uncontrolled hypertension, medicine team followed throughout their hospital stay, antihypertensives given accordingly. Given no progression of bowel function, decision was made to take patient to the OR on 6/26 for laparoscopic lysis of adhesions, converted to open lysis of adhesions, SBR x 3 and left in discontinuity, temporary abdominal closure. Transferred to SICU post op for hemodynamic monitoring, remained intubated. Course complicated by right sided pneumothorax, thoracic surgery was consulted, placed chest tube on 6/27. Patient returned to the OR on 6/28 for ex lap, removal of abthera, ileocolic resection, small bowel anastamosis, abdominal wall closure. Post operative hemoglobin downtrended to 8.5 from 9, given 1U PRBC. Started on TPN for nutritional optimization and successfully extubated to high flow NC on 6/29. Respiratory status continued to be monitored closely with serial CXRs, chest PT. Course complicated by fevers, altered mental status, fever workup obtained, CXR showing left lung consolidation/effusion, urinalysis negative, blood cultures negative. OR cultures growing enterococcus, infectious disease team consulted, antibiotics adjusted. PICC line was placed on 6/30, no complications. Patient was diuresed as necessary with lasix for fluid overload. Nasogastric tube was removed on 7/1. Wound vac changed 3 times/wk. Uptrending leukocytosis, CT chest AP was obtained on 7/2 showing small bowel malrotation, early/partial bowel obstruction vs post op ileus, small bilateral pleural effusions w/ bibasilar atelectasis. Transfused 1U PRBC for hemoglobin 7.7, responded appropriately. Diet was advanced as tolerated and per return of bowel function. Chest tube was removed on 7/3, repeat CXR with significant improvement. Patient was taken by interventional radiology team for perihepatic aspiration of serous fluid on 7/3. Patient returned to the OR on 7/5 for wound dehisence for exlap, washout, ileocolic resection with end ileostomy, blow hole colostomy, fistula, red rubber from ileostomy to small bowel anastomosis; vicryl bridging mesh; R JOYCE below ileostomy, L JOYCE at small bowel enterotomy repair. SICU for post op monitoring.  Abdominal fluid growing C. tertium/Lactobacillus, antibiotics adjusted per ID team. Course complicated by post op fever, OR cultures growing rare yeast, started fluconazole. Successfully extubated on 7/6. Worked with ostomy nurses. Uncontrolled HTN, medications adjusted accordingly, transitioned to cardene gtt.           ***LAST UPDATED 7/5***       78M w/ Crohn's, AFib/Flutter s/p DCCVs, remote ileocectomy and open appy, presented to [facility] with abdominal pain, outpatient CT concerning for dilated loops of small bowel and stomach, admitted for resolving SBO vs Crohn's flare, placed a nasogastric tube for decompression, plan for nonoperative management with serial abdominal exams and close hemodynamic monitoring. Course significant for uncontrolled hypertension, medicine team followed throughout their hospital stay, antihypertensives given accordingly. Given no progression of bowel function, decision was made to take patient to the OR on 6/26 for laparoscopic lysis of adhesions, converted to open lysis of adhesions, SBR x 3 and left in discontinuity, temporary abdominal closure. Transferred to SICU post op for hemodynamic monitoring, remained intubated. Course complicated by right sided pneumothorax, thoracic surgery was consulted, placed chest tube on 6/27. Patient returned to the OR on 6/28 for ex lap, removal of abthera, ileocolic resection, small bowel anastamosis, abdominal wall closure. Post operative hemoglobin downtrended to 8.5 from 9, given 1U PRBC. Started on TPN for nutritional optimization and successfully extubated to high flow NC on 6/29. Respiratory status continued to be monitored closely with serial CXRs, chest PT. Course complicated by fevers, altered mental status, fever workup obtained, CXR showing left lung consolidation/effusion, urinalysis negative, blood cultures negative. OR cultures growing enterococcus, infectious disease team consulted, antibiotics adjusted. PICC line was placed on 6/30, no complications. Patient was diuresed as necessary with lasix for fluid overload. Nasogastric tube was removed on 7/1. Wound vac changed 3 times/wk. Uptrending leukocytosis, CT chest AP was obtained on 7/2 showing small bowel malrotation, early/partial bowel obstruction vs post op ileus, small bilateral pleural effusions w/ bibasilar atelectasis. Transfused 1U PRBC for hemoglobin 7.7, responded appropriately. Diet was advanced as tolerated and per return of bowel function. Chest tube was removed on 7/3, repeat CXR with significant improvement. Patient was taken by interventional radiology team for perihepatic aspiration of serous fluid on 7/3. Patient returned to the OR on 7/5 for wound dehisence for exlap, washout, ileocolic resection with end ileostomy, blow hole colostomy, fistula, red rubber from ileostomy to small bowel anastomosis; vicryl bridging mesh; R JOYCE below ileostomy, L JOYCE at small bowel enterotomy repair. SICU for post op monitoring.  Abdominal fluid growing C. tertium/Lactobacillus, antibiotics adjusted per ID team. Course complicated by post op fever, OR cultures growing rare yeast, started fluconazole. Successfully extubated on 7/6. Worked with ostomy nurses. Uncontrolled HTN, medications adjusted accordingly, transitioned to cardene gtt. Bedside US showing small bilateral pleural effusions, continued to be diureses as necessary. TPN restarted, NGT and red rubber removed on 7/10, diet advanced as tolerated, cleared by speech and swallow evaluation for cough. Continued wound vac changes. Slight uptrending WBC post op....          ***LAST UPDATED 7/18***   78M w/ Crohn's, AFib/Flutter s/p DCCVs, remote ileocectomy and open appy, presented to [facility] with abdominal pain, outpatient CT concerning for dilated loops of small bowel and stomach, admitted for resolving SBO vs Crohn's flare, placed a nasogastric tube for decompression, plan for nonoperative management with serial abdominal exams and close hemodynamic monitoring. Course significant for uncontrolled hypertension, medicine team followed throughout their hospital stay, antihypertensives given accordingly. Given no progression of bowel function, decision was made to take patient to the OR on 6/26 for laparoscopic lysis of adhesions, converted to open lysis of adhesions, SBR x 3 and left in discontinuity, temporary abdominal closure. Transferred to SICU post op for hemodynamic monitoring, remained intubated. Course complicated by right sided pneumothorax, thoracic surgery was consulted, placed chest tube on 6/27. Patient returned to the OR on 6/28 for ex lap, removal of abthera, ileocolic resection, small bowel anastamosis, abdominal wall closure. Post operative hemoglobin downtrended to 8.5 from 9, given 1U PRBC. Started on TPN for nutritional optimization and successfully extubated to high flow NC on 6/29. Respiratory status continued to be monitored closely with serial CXRs, chest PT. Course complicated by fevers, altered mental status, fever workup obtained, CXR showing left lung consolidation/effusion, urinalysis negative, blood cultures negative. OR cultures growing enterococcus, infectious disease team consulted, antibiotics adjusted. PICC line was placed on 6/30, no complications. Patient was diuresed as necessary with lasix for fluid overload. Nasogastric tube was removed on 7/1. Wound vac changed 3 times/wk. Uptrending leukocytosis, CT chest AP was obtained on 7/2 showing small bowel malrotation, early/partial bowel obstruction vs post op ileus, small bilateral pleural effusions w/ bibasilar atelectasis. Transfused 1U PRBC for hemoglobin 7.7, responded appropriately. Diet was advanced as tolerated and per return of bowel function. Chest tube was removed on 7/3, repeat CXR with significant improvement. Patient was taken by interventional radiology team for perihepatic aspiration of serous fluid on 7/3. Patient returned to the OR on 7/5 for wound dehisence for exlap, washout, ileocolic resection with end ileostomy, blow hole colostomy, fistula, red rubber from ileostomy to small bowel anastomosis; vicryl bridging mesh; R JOYCE below ileostomy, L JOYCE at small bowel enterotomy repair. SICU for post op monitoring.  Abdominal fluid growing C. tertium/Lactobacillus, antibiotics adjusted per ID team. Course complicated by post op fever, OR cultures growing rare yeast, started fluconazole. Successfully extubated on 7/6. Worked with ostomy nurses. Uncontrolled HTN, medications adjusted accordingly, transitioned to cardene gtt. Bedside US showing small bilateral pleural effusions, continued to be diureses as necessary. TPN restarted, NGT and red rubber removed on 7/10, diet advanced as tolerated, cleared by speech and swallow evaluation for cough. Continued wound vac changes. Slight uptrending WBC post op....          ***LAST UPDATED 7/18***   78M w/ Crohn's, AFib/Flutter s/p DCCVs, remote ileocectomy and open appy, presented to [facility] with abdominal pain, outpatient CT concerning for dilated loops of small bowel and stomach, admitted for resolving SBO vs Crohn's flare, placed a nasogastric tube for decompression, plan for nonoperative management with serial abdominal exams and close hemodynamic monitoring. Course significant for uncontrolled hypertension, medicine team followed throughout their hospital stay, antihypertensives given accordingly. Given no progression of bowel function, decision was made to take patient to the OR on 6/26 for laparoscopic lysis of adhesions, converted to open lysis of adhesions, SBR x 3 and left in discontinuity, temporary abdominal closure. Transferred to SICU post op for hemodynamic monitoring, remained intubated. Course complicated by right sided pneumothorax, thoracic surgery was consulted, placed chest tube on 6/27. Patient returned to the OR on 6/28 for ex lap, removal of abthera, ileocolic resection, small bowel anastamosis, abdominal wall closure. Post operative hemoglobin downtrended to 8.5 from 9, given 1U PRBC. Started on TPN for nutritional optimization and successfully extubated to high flow NC on 6/29. Respiratory status continued to be monitored closely with serial CXRs, chest PT. Course complicated by fevers, altered mental status, fever workup obtained, CXR showing left lung consolidation/effusion, urinalysis negative, blood cultures negative. OR cultures growing enterococcus, infectious disease team consulted, antibiotics adjusted. PICC line was placed on 6/30, no complications. Patient was diuresed as necessary with lasix for fluid overload. Nasogastric tube was removed on 7/1. Wound vac changed 3 times/wk. Uptrending leukocytosis, CT chest AP was obtained on 7/2 showing small bowel malrotation, early/partial bowel obstruction vs post op ileus, small bilateral pleural effusions w/ bibasilar atelectasis. Transfused 1U PRBC for hemoglobin 7.7, responded appropriately. Diet was advanced as tolerated and per return of bowel function. Chest tube was removed on 7/3, repeat CXR with significant improvement. Patient was taken by interventional radiology team for perihepatic aspiration of serous fluid on 7/3. Patient returned to the OR on 7/5 for wound dehiscence for exlap, washout, ileocolic resection with end ileostomy, blow hole colostomy, fistula, red rubber from ileostomy to small bowel anastomosis; vicryl bridging mesh; R JOYCE below ileostomy, L JOYCE at small bowel enterotomy repair. SICU for post op monitoring.  Abdominal fluid growing C. tertium/Lactobacillus, antibiotics adjusted per ID team. Course complicated by post op fever, OR cultures growing rare yeast, started fluconazole. Successfully extubated on 7/6. Worked with ostomy nurses. Uncontrolled HTN, medications adjusted accordingly, transitioned to cardene gtt. Bedside US showing small bilateral pleural effusions, continued to be diuresed as necessary. TPN restarted, NGT and red rubber removed on 7/10, diet advanced as tolerated, cleared by speech and swallow evaluation for cough. Continued wound vac changes. Slight uptrending WBC post op, surveillance blood cultures sent, urine lytes sent, consistent with intrinsic renal disease. Echo obtained showing mild LVH w/ EF 65-70%. Liver function tests uptrending, ultrasound of abdomen completed showing no evidence of portal or hepatic venous thrombosis, fluid/sludge distended GB, no cholelithiasis or cholecystitis. Fever workup completed on 7/22 for Tmax 101. CT head/chest/abdomen/pelvis was obtained on 7/23 showing persistent ECF, Bilateral pleural effusions L>R, no acute intracranial hemorrhage. Blood cxs/MRSA/staph negative.          ***LAST UPDATED 7/25***     78M w/ Crohn's, AFib/Flutter s/p DCCVs, remote ileocectomy and open appy, presented to [facility] with abdominal pain, outpatient CT concerning for dilated loops of small bowel and stomach, admitted for resolving SBO vs Crohn's flare, placed a nasogastric tube for decompression, plan for nonoperative management with serial abdominal exams and close hemodynamic monitoring. Course significant for uncontrolled hypertension, medicine team followed throughout their hospital stay, antihypertensives given accordingly. Given no progression of bowel function, decision was made to take patient to the OR on 6/26 for laparoscopic lysis of adhesions, converted to open lysis of adhesions, SBR x 3 and left in discontinuity, temporary abdominal closure. Transferred to SICU post op for hemodynamic monitoring, remained intubated. Course complicated by right sided pneumothorax, thoracic surgery was consulted, placed chest tube on 6/27. Patient returned to the OR on 6/28 for ex lap, removal of abthera, ileocolic resection, small bowel anastamosis, abdominal wall closure. Post operative hemoglobin downtrended to 8.5 from 9, given 1U PRBC. Started on TPN for nutritional optimization and successfully extubated to high flow NC on 6/29. Respiratory status continued to be monitored closely with serial CXRs, chest PT. Course complicated by fevers, altered mental status, fever workup obtained, CXR showing left lung consolidation/effusion, urinalysis negative, blood cultures negative. OR cultures growing enterococcus, infectious disease team consulted, antibiotics adjusted. PICC line was placed on 6/30, no complications. Patient was diuresed as necessary with lasix for fluid overload. Nasogastric tube was removed on 7/1. Wound vac changed 3 times/wk. Uptrending leukocytosis, CT chest AP was obtained on 7/2 showing small bowel malrotation, early/partial bowel obstruction vs post op ileus, small bilateral pleural effusions w/ bibasilar atelectasis. Transfused 1U PRBC for hemoglobin 7.7, responded appropriately. Diet was advanced as tolerated and per return of bowel function. Chest tube was removed on 7/3, repeat CXR with significant improvement. Patient was taken by interventional radiology team for perihepatic aspiration of serous fluid on 7/3. Patient returned to the OR on 7/5 for wound dehiscence for exlap, washout, ileocolic resection with end ileostomy, blow hole colostomy, fistula, red rubber from ileostomy to small bowel anastomosis; vicryl bridging mesh; R JOYCE below ileostomy, L JOYCE at small bowel enterotomy repair. SICU for post op monitoring.  Abdominal fluid growing C. tertium/Lactobacillus, antibiotics adjusted per ID team. Course complicated by post op fever, OR cultures growing rare yeast, started fluconazole. Successfully extubated on 7/6. Worked with ostomy nurses. Uncontrolled HTN, medications adjusted accordingly, transitioned to cardene gtt. Bedside US showing small bilateral pleural effusions, continued to be diuresed as necessary. TPN restarted, NGT and red rubber removed on 7/10, diet advanced as tolerated, cleared by speech and swallow evaluation for cough. Continued wound vac changes. Slight uptrending WBC post op, surveillance blood cultures sent, urine lytes sent, consistent with intrinsic renal disease. Echo obtained showing mild LVH w/ EF 65-70%. Liver function tests uptrending, ultrasound of abdomen completed showing no evidence of portal or hepatic venous thrombosis, fluid/sludge distended GB, no cholelithiasis or cholecystitis. Fever workup completed on 7/22 for Tmax 101. CT head/chest/abdomen/pelvis was obtained on 7/23 showing persistent ECF, Bilateral pleural effusions L>R, no acute intracranial hemorrhage. Blood cxs/MRSA/staph negative.          ***LAST UPDATED 7/25***     78M w/ Crohn's, AFib/Flutter s/p DCCVs, remote ileocectomy and open appy, presented to [facility] with abdominal pain, outpatient CT concerning for dilated loops of small bowel and stomach, admitted for resolving SBO vs Crohn's flare, placed a nasogastric tube for decompression, plan for nonoperative management with serial abdominal exams and close hemodynamic monitoring. Course significant for uncontrolled hypertension, medicine team followed throughout their hospital stay, antihypertensives given accordingly. Given no progression of bowel function, decision was made to take patient to the OR on 6/26 for laparoscopic lysis of adhesions, converted to open lysis of adhesions, SBR x 3 and left in discontinuity, temporary abdominal closure. Transferred to SICU post op for hemodynamic monitoring, remained intubated. Course complicated by right sided pneumothorax, thoracic surgery was consulted, placed chest tube on 6/27. Patient returned to the OR on 6/28 for ex lap, removal of abthera, ileocolic resection, small bowel anastamosis, abdominal wall closure. Post operative hemoglobin downtrended to 8.5 from 9, given 1U PRBC. Started on TPN for nutritional optimization and successfully extubated to high flow NC on 6/29. Respiratory status continued to be monitored closely with serial CXRs, chest PT. Course complicated by fevers, altered mental status, fever workup obtained, CXR showing left lung consolidation/effusion, urinalysis negative, blood cultures negative. OR cultures growing enterococcus, infectious disease team consulted, antibiotics adjusted. PICC line was placed on 6/30, no complications. Patient was diuresed as necessary with lasix for fluid overload. Nasogastric tube was removed on 7/1. Wound vac changed 3 times/wk. Uptrending leukocytosis, CT chest AP was obtained on 7/2 showing small bowel malrotation, early/partial bowel obstruction vs post op ileus, small bilateral pleural effusions w/ bibasilar atelectasis. Transfused 1U PRBC for hemoglobin 7.7, responded appropriately. Diet was advanced as tolerated and per return of bowel function. Chest tube was removed on 7/3, repeat CXR with significant improvement. Patient was taken by interventional radiology team for perihepatic aspiration of serous fluid on 7/3. Patient returned to the OR on 7/5 for wound dehiscence for exlap, washout, ileocolic resection with end ileostomy, blow hole colostomy, fistula, red rubber from ileostomy to small bowel anastomosis; vicryl bridging mesh; R JOYCE below ileostomy, L JOYCE at small bowel enterotomy repair. SICU for post op monitoring.  Abdominal fluid growing C. tertium/Lactobacillus, antibiotics adjusted per ID team. Course complicated by post op fever, OR cultures growing rare yeast, started fluconazole. Successfully extubated on 7/6. Worked with ostomy nurses. Uncontrolled HTN, medications adjusted accordingly, transitioned to cardene gtt. Bedside US showing small bilateral pleural effusions, continued to be diuresed as necessary. TPN restarted, NGT and red rubber removed on 7/10, diet advanced as tolerated, cleared by speech and swallow evaluation for cough. Continued wound vac changes. Slight uptrending WBC post op, surveillance blood cultures sent, urine lytes sent, consistent with intrinsic renal disease. Echo obtained showing mild LVH w/ EF 65-70%. Liver function tests uptrending, ultrasound of abdomen completed showing no evidence of portal or hepatic venous thrombosis, fluid/sludge distended GB, no cholelithiasis or cholecystitis. Fever workup completed on 7/22 for Tmax 101. CT head/chest/abdomen/pelvis was obtained on 7/23 showing persistent ECF, Bilateral pleural effusions L>R, no acute intracranial hemorrhage. Blood cxs/MRSA/staph negative.          ***LAST UPDATED 7/25***     78M w/ Crohn's, AFib/Flutter s/p DCCVs, remote ileocectomy and open appy, presented to [facility] with abdominal pain, outpatient CT concerning for dilated loops of small bowel and stomach, admitted for resolving SBO vs Crohn's flare, placed a nasogastric tube for decompression, plan for nonoperative management with serial abdominal exams and close hemodynamic monitoring. Course significant for uncontrolled hypertension, medicine team followed throughout their hospital stay, antihypertensives given accordingly. Given no progression of bowel function, decision was made to take patient to the OR on 6/26 for laparoscopic lysis of adhesions, converted to open lysis of adhesions, SBR x 3 and left in discontinuity, temporary abdominal closure. Transferred to SICU post op for hemodynamic monitoring, remained intubated. Course complicated by right sided pneumothorax, thoracic surgery was consulted, placed chest tube on 6/27. Patient returned to the OR on 6/28 for ex lap, removal of abthera, ileocolic resection, small bowel anastamosis, abdominal wall closure. Post operative hemoglobin downtrended to 8.5 from 9, given 1U PRBC. Started on TPN for nutritional optimization and successfully extubated to high flow NC on 6/29. Respiratory status continued to be monitored closely with serial CXRs, chest PT. Course complicated by fevers, altered mental status, fever workup obtained, CXR showing left lung consolidation/effusion, urinalysis negative, blood cultures negative. OR cultures growing enterococcus, infectious disease team consulted, antibiotics adjusted. PICC line was placed on 6/30, no complications. Patient was diuresed as necessary with lasix for fluid overload. Nasogastric tube was removed on 7/1. Wound vac changed 3 times/wk. Uptrending leukocytosis, CT chest AP was obtained on 7/2 showing small bowel malrotation, early/partial bowel obstruction vs post op ileus, small bilateral pleural effusions w/ bibasilar atelectasis. Transfused 1U PRBC for hemoglobin 7.7, responded appropriately. Diet was advanced as tolerated and per return of bowel function. Chest tube was removed on 7/3, repeat CXR with significant improvement. Patient was taken by interventional radiology team for perihepatic aspiration of serous fluid on 7/3. Patient returned to the OR on 7/5 for wound dehiscence for exlap, washout, ileocolic resection with end ileostomy, blow hole colostomy, fistula, red rubber from ileostomy to small bowel anastomosis; vicryl bridging mesh; R JOYCE below ileostomy, L JOYCE at small bowel enterotomy repair. SICU for post op monitoring.  Abdominal fluid growing C. tertium/Lactobacillus, antibiotics adjusted per ID team. Course complicated by post op fever, OR cultures growing rare yeast, started fluconazole. Successfully extubated on 7/6. Worked with ostomy nurses. Uncontrolled HTN, medications adjusted accordingly, transitioned to cardene gtt. Bedside US showing small bilateral pleural effusions, continued to be diuresed as necessary. TPN restarted, NGT and red rubber removed on 7/10, diet advanced as tolerated, cleared by speech and swallow evaluation for cough. Continued wound vac changes. Slight uptrending WBC post op, surveillance blood cultures sent, urine lytes sent, consistent with intrinsic renal disease. Echo obtained showing mild LVH w/ EF 65-70%. Liver function tests uptrending, ultrasound of abdomen completed showing no evidence of portal or hepatic venous thrombosis, fluid/sludge distended GB, no cholelithiasis or cholecystitis. Fever workup completed on 7/22 for Tmax 101. CT head/chest/abdomen/pelvis was obtained on 7/23 showing persistent ECF, Bilateral pleural effusions L>R. Fistula leana omcreasinmg no acute intracranial hemorrhage. Blood cxs/MRSA/staph negative.  Fistula had increasing output, wound care followed patient. Patient was stepped down, ___.       At the time of discharge, patient was hemodynamically stable to be discharged to __ with ___.    ***LAST UPDATED 8/1***     78M w/ Crohn's, AFib/Flutter s/p DCCVs, remote ileocectomy and open appy, presented to [facility] with abdominal pain, outpatient CT concerning for dilated loops of small bowel and stomach, admitted for resolving SBO vs Crohn's flare, placed a nasogastric tube for decompression, plan for nonoperative management with serial abdominal exams and close hemodynamic monitoring. Course significant for uncontrolled hypertension, medicine team followed throughout their hospital stay, antihypertensives given accordingly. Given no progression of bowel function, decision was made to take patient to the OR on 6/26 for laparoscopic lysis of adhesions, converted to open lysis of adhesions, SBR x 3 and left in discontinuity, temporary abdominal closure. Transferred to SICU post op for hemodynamic monitoring, remained intubated. Course complicated by right sided pneumothorax, thoracic surgery was consulted, placed chest tube on 6/27. Patient returned to the OR on 6/28 for ex lap, removal of abthera, ileocolic resection, small bowel anastamosis, abdominal wall closure. Post operative hemoglobin downtrended to 8.5 from 9, given 1U PRBC. Started on TPN for nutritional optimization and successfully extubated to high flow NC on 6/29. Respiratory status continued to be monitored closely with serial CXRs, chest PT. Course complicated by fevers, altered mental status, fever workup obtained, CXR showing left lung consolidation/effusion, urinalysis negative, blood cultures negative. OR cultures growing enterococcus, infectious disease team consulted, antibiotics adjusted. PICC line was placed on 6/30, no complications. Patient was diuresed as necessary with lasix for fluid overload. Nasogastric tube was removed on 7/1. Wound vac changed 3 times/wk. Uptrending leukocytosis, CT chest AP was obtained on 7/2 showing small bowel malrotation, early/partial bowel obstruction vs post op ileus, small bilateral pleural effusions w/ bibasilar atelectasis. Transfused 1U PRBC for hemoglobin 7.7, responded appropriately. Diet was advanced as tolerated and per return of bowel function. Chest tube was removed on 7/3, repeat CXR with significant improvement. Patient was taken by interventional radiology team for perihepatic aspiration of serous fluid on 7/3. Patient returned to the OR on 7/5 for wound dehiscence for exlap, washout, ileocolic resection with end ileostomy, blow hole colostomy, fistula, red rubber from ileostomy to small bowel anastomosis; vicryl bridging mesh; R JOYCE below ileostomy, L JOYCE at small bowel enterotomy repair. SICU for post op monitoring.  Abdominal fluid growing C. tertium/Lactobacillus, antibiotics adjusted per ID team. Course complicated by post op fever, OR cultures growing rare yeast, started fluconazole. Successfully extubated on 7/6. Worked with ostomy nurses. Uncontrolled HTN, medications adjusted accordingly, transitioned to cardene gtt. Bedside US showing small bilateral pleural effusions, continued to be diuresed as necessary. TPN restarted, NGT and red rubber removed on 7/10, diet advanced as tolerated, cleared by speech and swallow evaluation for cough. Continued wound vac changes. Slight uptrending WBC post op, surveillance blood cultures sent, urine lytes sent, consistent with intrinsic renal disease. Echo obtained showing mild LVH w/ EF 65-70%. Liver function tests uptrending, ultrasound of abdomen completed showing no evidence of portal or hepatic venous thrombosis, fluid/sludge distended GB, no cholelithiasis or cholecystitis. Fever workup completed on 7/22 for Tmax 101. CT head/chest/abdomen/pelvis was obtained on 7/23 showing persistent ECF, Bilateral pleural effusions L>R. Fistula leana omcreasinmg no acute intracranial hemorrhage. Blood cxs/MRSA/staph negative.  Fistula had increasing output, wound care followed patient. Patient was stepped down, ___.       At the time of discharge, patient was hemodynamically stable to be discharged to __ with ___.    ***LAST UPDATED 8/1***     78M w/ Crohn's, AFib/Flutter s/p DCCVs, remote ileocectomy and open appy, presented to [facility] with abdominal pain, outpatient CT concerning for dilated loops of small bowel and stomach, admitted for resolving SBO vs Crohn's flare, placed a nasogastric tube for decompression, plan for nonoperative management with serial abdominal exams and close hemodynamic monitoring. Course significant for uncontrolled hypertension, medicine team followed throughout their hospital stay, antihypertensives given accordingly. Given no progression of bowel function, decision was made to take patient to the OR on 6/26 for laparoscopic lysis of adhesions, converted to open lysis of adhesions, SBR x 3 and left in discontinuity, temporary abdominal closure. Transferred to SICU post op for hemodynamic monitoring, remained intubated. Course complicated by right sided pneumothorax, thoracic surgery was consulted, placed chest tube on 6/27. Patient returned to the OR on 6/28 for ex lap, removal of abthera, ileocolic resection, small bowel anastamosis, abdominal wall closure. Post operative hemoglobin downtrended to 8.5 from 9, given 1U PRBC. Started on TPN for nutritional optimization and successfully extubated to high flow NC on 6/29. Respiratory status continued to be monitored closely with serial CXRs, chest PT. Course complicated by fevers, altered mental status, fever workup obtained, CXR showing left lung consolidation/effusion, urinalysis negative, blood cultures negative. OR cultures growing enterococcus, infectious disease team consulted, antibiotics adjusted. PICC line was placed on 6/30, no complications. Patient was diuresed as necessary with lasix for fluid overload. Nasogastric tube was removed on 7/1. Wound vac changed 3 times/wk. Uptrending leukocytosis, CT chest AP was obtained on 7/2 showing small bowel malrotation, early/partial bowel obstruction vs post op ileus, small bilateral pleural effusions w/ bibasilar atelectasis. Transfused 1U PRBC for hemoglobin 7.7, responded appropriately. Diet was advanced as tolerated and per return of bowel function. Chest tube was removed on 7/3, repeat CXR with significant improvement. Patient was taken by interventional radiology team for perihepatic aspiration of serous fluid on 7/3. Patient returned to the OR on 7/5 for wound dehiscence for exlap, washout, ileocolic resection with end ileostomy, blow hole colostomy, fistula, red rubber from ileostomy to small bowel anastomosis; vicryl bridging mesh; R JOYCE below ileostomy, L JOYCE at small bowel enterotomy repair. SICU for post op monitoring.  Abdominal fluid growing C. tertium/Lactobacillus, antibiotics adjusted per ID team. Course complicated by post op fever, OR cultures growing rare yeast, started fluconazole. Successfully extubated on 7/6. Worked with ostomy nurses. Uncontrolled HTN, medications adjusted accordingly, transitioned to cardene gtt. Bedside US showing small bilateral pleural effusions, continued to be diuresed as necessary. TPN restarted, NGT and red rubber removed on 7/10, diet advanced as tolerated, cleared by speech and swallow evaluation for cough. Continued wound vac changes. Slight uptrending WBC post op, surveillance blood cultures sent, urine lytes sent, consistent with intrinsic renal disease. Echo obtained showing mild LVH w/ EF 65-70%. Liver function tests uptrending, ultrasound of abdomen completed showing no evidence of portal or hepatic venous thrombosis, fluid/sludge distended GB, no cholelithiasis or cholecystitis. Fever workup completed on 7/22 for Tmax 101. CT head/chest/abdomen/pelvis was obtained on 7/23 showing persistent ECF, Bilateral pleural effusions L>R. Fistula leana omcreasinmg no acute intracranial hemorrhage. Blood cxs/MRSA/staph negative.  Fistula had increasing output, wound care followed patient. Patient was stepped down, ___.       At the time of discharge, patient was hemodynamically stable to be discharged to __ with ___.    ***LAST UPDATED 8/1***     78M w/ Crohn's, AFib/Flutter s/p DCCVs, remote ileocectomy and open appy, presented to [facility] with abdominal pain, outpatient CT concerning for dilated loops of small bowel and stomach, admitted for resolving SBO vs Crohn's flare, placed a nasogastric tube for decompression, plan for nonoperative management with serial abdominal exams and close hemodynamic monitoring. Course significant for uncontrolled hypertension, medicine team followed throughout their hospital stay, antihypertensives given accordingly. Given no progression of bowel function, decision was made to take patient to the OR on 6/26 for laparoscopic lysis of adhesions, converted to open lysis of adhesions, SBR x 3 and left in discontinuity, temporary abdominal closure. Transferred to SICU post op for hemodynamic monitoring, remained intubated. Course complicated by right sided pneumothorax, thoracic surgery was consulted, placed chest tube on 6/27. Patient returned to the OR on 6/28 for ex lap, removal of abthera, ileocolic resection, small bowel anastamosis, abdominal wall closure. Post operative hemoglobin downtrended to 8.5 from 9, given 1U PRBC. Started on TPN for nutritional optimization and successfully extubated to high flow NC on 6/29. Respiratory status continued to be monitored closely with serial CXRs, chest PT. Course complicated by fevers, altered mental status, fever workup obtained, CXR showing left lung consolidation/effusion, urinalysis negative, blood cultures negative. OR cultures growing enterococcus, infectious disease team consulted, antibiotics adjusted. PICC line was placed on 6/30, no complications. Patient was diuresed as necessary with lasix for fluid overload. Nasogastric tube was removed on 7/1. Wound vac changed 3 times/wk. Uptrending leukocytosis, CT chest AP was obtained on 7/2 showing small bowel malrotation, early/partial bowel obstruction vs post op ileus, small bilateral pleural effusions w/ bibasilar atelectasis. Transfused 1U PRBC for hemoglobin 7.7, responded appropriately. Diet was advanced as tolerated and per return of bowel function. Chest tube was removed on 7/3, repeat CXR with significant improvement. Patient was taken by interventional radiology team for perihepatic aspiration of serous fluid on 7/3. Patient returned to the OR on 7/5 for wound dehiscence for exlap, washout, ileocolic resection with end ileostomy, blow hole colostomy, fistula, red rubber from ileostomy to small bowel anastomosis; vicryl bridging mesh; R JOYCE below ileostomy, L JOYCE at small bowel enterotomy repair. SICU for post op monitoring.  Abdominal fluid growing C. tertium/Lactobacillus, antibiotics adjusted per ID team. Course complicated by post op fever, OR cultures growing rare yeast, started fluconazole. Successfully extubated on 7/6. Worked with ostomy nurses. Uncontrolled HTN, medications adjusted accordingly, transitioned to cardene gtt. Bedside US showing small bilateral pleural effusions, continued to be diuresed as necessary. TPN restarted, NGT and red rubber removed on 7/10, diet advanced as tolerated, cleared by speech and swallow evaluation for cough. Continued wound vac changes. Slight uptrending WBC post op, surveillance blood cultures sent, urine lytes sent, consistent with intrinsic renal disease. Echo obtained showing mild LVH w/ EF 65-70%. Liver function tests uptrending, ultrasound of abdomen completed showing no evidence of portal or hepatic venous thrombosis, fluid/sludge distended GB, no cholelithiasis or cholecystitis. Fever workup completed on 7/22 for Tmax 101. CT head/chest/abdomen/pelvis was obtained on 7/23 showing persistent ECF, Bilateral pleural effusions L>R. No acute intracranial hemorrhage. Blood cxs/MRSA/staph negative. Fistula had increasing output, wound care followed patient. Patient was stepped down, ___.       At the time of discharge, patient was hemodynamically stable to be discharged to __ with ___.    ***LAST UPDATED 8/1***   78M w/ Crohn's, AFib/Flutter s/p DCCVs, remote ileocectomy and open appy, presented to [facility] with abdominal pain, outpatient CT concerning for dilated loops of small bowel and stomach, admitted for resolving SBO vs Crohn's flare, placed a nasogastric tube for decompression, plan for nonoperative management with serial abdominal exams and close hemodynamic monitoring. Course significant for uncontrolled hypertension, medicine team followed throughout their hospital stay, antihypertensives given accordingly. Given no progression of bowel function, decision was made to take patient to the OR on 6/26 for laparoscopic lysis of adhesions, converted to open lysis of adhesions, SBR x 3 and left in discontinuity, temporary abdominal closure. Transferred to SICU post op for hemodynamic monitoring, remained intubated. Course complicated by right sided pneumothorax, thoracic surgery was consulted, placed chest tube on 6/27. Patient returned to the OR on 6/28 for ex lap, removal of abthera, ileocolic resection, small bowel anastamosis, abdominal wall closure. Post operative hemoglobin downtrended to 8.5 from 9, given 1U PRBC. Started on TPN for nutritional optimization and successfully extubated to high flow NC on 6/29. Respiratory status continued to be monitored closely with serial CXRs, chest PT. Course complicated by fevers, altered mental status, fever workup obtained, CXR showing left lung consolidation/effusion, urinalysis negative, blood cultures negative. OR cultures growing enterococcus, infectious disease team consulted, antibiotics adjusted. PICC line was placed on 6/30, no complications. Patient was diuresed as necessary with lasix for fluid overload. Nasogastric tube was removed on 7/1. Wound vac changed 3 times/wk. Uptrending leukocytosis, CT chest AP was obtained on 7/2 showing small bowel malrotation, early/partial bowel obstruction vs post op ileus, small bilateral pleural effusions w/ bibasilar atelectasis. Transfused 1U PRBC for hemoglobin 7.7, responded appropriately. Diet was advanced as tolerated and per return of bowel function. Chest tube was removed on 7/3, repeat CXR with significant improvement. Patient was taken by interventional radiology team for perihepatic aspiration of serous fluid on 7/3. Patient returned to the OR on 7/5 for wound dehiscence for exlap, washout, ileocolic resection with end ileostomy, blow hole colostomy, fistula, red rubber from ileostomy to small bowel anastomosis; vicryl bridging mesh; R JOYCE below ileostomy, L JOYCE at small bowel enterotomy repair. SICU for post op monitoring.  Abdominal fluid growing C. tertium/Lactobacillus, antibiotics adjusted per ID team. Course complicated by post op fever, OR cultures growing rare yeast, started fluconazole. Successfully extubated on 7/6. Worked with ostomy nurses. Uncontrolled HTN, medications adjusted accordingly, transitioned to cardene gtt. Bedside US showing small bilateral pleural effusions, continued to be diuresed as necessary. TPN restarted, NGT and red rubber removed on 7/10, diet advanced as tolerated, cleared by speech and swallow evaluation for cough. Continued wound vac changes. Slight uptrending WBC post op, surveillance blood cultures sent, urine lytes sent, consistent with intrinsic renal disease. Echo obtained showing mild LVH w/ EF 65-70%. Liver function tests uptrending, ultrasound of abdomen completed showing no evidence of portal or hepatic venous thrombosis, fluid/sludge distended GB, no cholelithiasis or cholecystitis. Fever workup completed on 7/22 for Tmax 101. CT head/chest/abdomen/pelvis was obtained on 7/23 showing persistent ECF, Bilateral pleural effusions L>R. No acute intracranial hemorrhage. Blood cxs/MRSA/staph negative. Fistula had increasing output, wound care followed patient. Patient was stepped down, ___.       At the time of discharge, patient was hemodynamically stable to be discharged to __ with ___.    ***LAST UPDATED 8/1***   78M w/ Crohn's, AFib/Flutter s/p DCCVs, remote ileocectomy and open appy, presented to [facility] with abdominal pain, outpatient CT concerning for dilated loops of small bowel and stomach, admitted for resolving SBO vs Crohn's flare, placed a nasogastric tube for decompression, plan for nonoperative management with serial abdominal exams and close hemodynamic monitoring. Course significant for uncontrolled hypertension, medicine team followed throughout their hospital stay, antihypertensives given accordingly. Given no progression of bowel function, decision was made to take patient to the OR on 6/26 for laparoscopic lysis of adhesions, converted to open lysis of adhesions, SBR x 3 and left in discontinuity, temporary abdominal closure. Transferred to SICU post op for hemodynamic monitoring, remained intubated. Course complicated by right sided pneumothorax, thoracic surgery was consulted, placed chest tube on 6/27. Patient returned to the OR on 6/28 for ex lap, removal of abthera, ileocolic resection, small bowel anastamosis, abdominal wall closure. Post operative hemoglobin downtrended to 8.5 from 9, given 1U PRBC. Started on TPN for nutritional optimization and successfully extubated to high flow NC on 6/29. Respiratory status continued to be monitored closely with serial CXRs, chest PT. Course complicated by fevers, altered mental status, fever workup obtained, CXR showing left lung consolidation/effusion, urinalysis negative, blood cultures negative. OR cultures growing enterococcus, infectious disease team consulted, antibiotics adjusted. PICC line was placed on 6/30, no complications. Patient was diuresed as necessary with lasix for fluid overload. Nasogastric tube was removed on 7/1. Wound vac changed 3 times/wk. Uptrending leukocytosis, CT chest AP was obtained on 7/2 showing small bowel malrotation, early/partial bowel obstruction vs post op ileus, small bilateral pleural effusions w/ bibasilar atelectasis. Transfused 1U PRBC for hemoglobin 7.7, responded appropriately. Diet was advanced as tolerated and per return of bowel function. Chest tube was removed on 7/3, repeat CXR with significant improvement. Patient was taken by interventional radiology team for perihepatic aspiration of serous fluid on 7/3. Patient returned to the OR on 7/5 for wound dehiscence for exlap, washout, ileocolic resection with end ileostomy, blow hole colostomy, fistula, red rubber from ileostomy to small bowel anastomosis; vicryl bridging mesh; R JOYCE below ileostomy, L JOYCE at small bowel enterotomy repair. SICU for post op monitoring.  Abdominal fluid growing C. tertium/Lactobacillus, antibiotics adjusted per ID team. Course complicated by post op fever, OR cultures growing rare yeast, started fluconazole. Successfully extubated on 7/6. Worked with ostomy nurses. Uncontrolled HTN, medications adjusted accordingly, transitioned to cardene gtt. Bedside US showing small bilateral pleural effusions, continued to be diuresed as necessary. TPN restarted, NGT and red rubber removed on 7/10, diet advanced as tolerated, cleared by speech and swallow evaluation for cough. Continued wound vac changes. Slight uptrending WBC post op, surveillance blood cultures sent, urine lytes sent, consistent with intrinsic renal disease. Echo obtained showing mild LVH w/ EF 65-70%. Liver function tests uptrending, ultrasound of abdomen completed showing no evidence of portal or hepatic venous thrombosis, fluid/sludge distended GB, no cholelithiasis or cholecystitis. Fever workup completed on 7/22 for Tmax 101. CT head/chest/abdomen/pelvis was obtained on 7/23 showing persistent ECF, Bilateral pleural effusions L>R. No acute intracranial hemorrhage. Blood cxs/MRSA/staph negative. Fistula had increasing output, wound care followed patient. Patient was stepped down, ___.       At the time of discharge, patient was hemodynamically stable to be discharged to __ with ___.    ***LAST UPDATED 8/1***   78M w/ Crohn's, AFib/Flutter s/p DCCVs, remote ileocectomy and open appy, presented to [facility] with abdominal pain, outpatient CT concerning for dilated loops of small bowel and stomach, admitted for resolving SBO vs Crohn's flare, placed a nasogastric tube for decompression, plan for nonoperative management with serial abdominal exams and close hemodynamic monitoring. Course significant for uncontrolled hypertension, medicine team followed throughout their hospital stay, antihypertensives given accordingly. Given no progression of bowel function, decision was made to take patient to the OR on 6/26 for laparoscopic lysis of adhesions, converted to open lysis of adhesions, SBR x 3 and left in discontinuity, temporary abdominal closure. Transferred to SICU post op for hemodynamic monitoring, remained intubated. Course complicated by right sided pneumothorax, thoracic surgery was consulted, placed chest tube on 6/27. Patient returned to the OR on 6/28 for ex lap, removal of abthera, ileocolic resection, small bowel anastamosis, abdominal wall closure. Post operative hemoglobin downtrended to 8.5 from 9, given 1U PRBC. Started on TPN for nutritional optimization and successfully extubated to high flow NC on 6/29. Respiratory status continued to be monitored closely with serial CXRs, chest PT. Course complicated by fevers, altered mental status, fever workup obtained, CXR showing left lung consolidation/effusion, urinalysis negative, blood cultures negative. OR cultures growing enterococcus, infectious disease team consulted, antibiotics adjusted. PICC line was placed on 6/30, no complications. Patient was diuresed as necessary with lasix for fluid overload. Nasogastric tube was removed on 7/1. Wound vac changed 3 times/wk. Uptrending leukocytosis, CT chest AP was obtained on 7/2 showing small bowel malrotation, early/partial bowel obstruction vs post op ileus, small bilateral pleural effusions w/ bibasilar atelectasis. Transfused 1U PRBC for hemoglobin 7.7, responded appropriately. Diet was advanced as tolerated and per return of bowel function. Chest tube was removed on 7/3, repeat CXR with significant improvement. Patient was taken by interventional radiology team for perihepatic aspiration of serous fluid on 7/3. Patient returned to the OR on 7/5 for wound dehiscence for exlap, washout, ileocolic resection with end ileostomy, blow hole colostomy, fistula, red rubber from ileostomy to small bowel anastomosis; vicryl bridging mesh; R JOYCE below ileostomy, L JOYCE at small bowel enterotomy repair. SICU for post op monitoring.  Abdominal fluid growing C. tertium/Lactobacillus, antibiotics adjusted per ID team. Course complicated by post op fever, OR cultures growing rare yeast, started fluconazole. Successfully extubated on 7/6. Worked with ostomy nurses. Uncontrolled HTN, medications adjusted accordingly, transitioned to cardene gtt. Bedside US showing small bilateral pleural effusions, continued to be diuresed as necessary. TPN restarted, NGT and red rubber removed on 7/10, diet advanced as tolerated, cleared by speech and swallow evaluation for cough. Continued wound vac changes. Slight uptrending WBC post op, surveillance blood cultures sent, urine lytes sent, consistent with intrinsic renal disease. Echo obtained showing mild LVH w/ EF 65-70%. Liver function tests uptrending, ultrasound of abdomen completed showing no evidence of portal or hepatic venous thrombosis, fluid/sludge distended GB, no cholelithiasis or cholecystitis. Fever workup completed on 7/22 for Tmax 101. CT head/chest/abdomen/pelvis was obtained on 7/23 showing persistent ECF, Bilateral pleural effusions L>R. No acute intracranial hemorrhage. Blood cxs/MRSA/staph negative. Fistula had increasing output, wound care followed patient, imodium started for high output. Course complicated by acute delirium, all potential causes discontinued, stepped up to ICU care for further management, PICC line removed on 8/23, started empiric IV antibiotics for potential line sepsis. CT head obtained on 8/23 - normal scan, no hemorrhage or infarcts. Infectious workup obtained, blood cultures negative. TSH elevated, given IV synthroid. ICU course significant for hypoglycemia, hyponatremia, fevers - workup continued to be negative; elevated liver function tests. RUQ US obtained 8/26 showing distended gallbladder with sludge, no wall thickening/pericholecystic fluid, notable for right pleural effusion. CTAP obtained showing increased small R pleural effusion w/ atelectasis, no pneumoperitoneum. Diet advanced as tolerated, PICC and blood cultures remained negative. Patient clinically improved, was stepped down to telemetry on 8/28.      At the time of discharge, patient was hemodynamically stable to be discharged to __ with ___.    ***LAST UPDATED 8/29***     78M w/ Crohn's, AFib/Flutter s/p DCCVs, remote ileocectomy and open appy, presented to [facility] with abdominal pain, outpatient CT concerning for dilated loops of small bowel and stomach, admitted for resolving SBO vs Crohn's flare, placed a nasogastric tube for decompression, plan for nonoperative management with serial abdominal exams and close hemodynamic monitoring. Course significant for uncontrolled hypertension, medicine team followed throughout their hospital stay, antihypertensives given accordingly. Given no progression of bowel function, decision was made to take patient to the OR on 6/26 for laparoscopic lysis of adhesions, converted to open lysis of adhesions, SBR x 3 and left in discontinuity, temporary abdominal closure. Transferred to SICU post op for hemodynamic monitoring, remained intubated. Course complicated by right sided pneumothorax, thoracic surgery was consulted, placed chest tube on 6/27. Patient returned to the OR on 6/28 for ex lap, removal of abthera, ileocolic resection, small bowel anastamosis, abdominal wall closure. Post operative hemoglobin downtrended to 8.5 from 9, given 1U PRBC. Started on TPN for nutritional optimization and successfully extubated to high flow NC on 6/29. Respiratory status continued to be monitored closely with serial CXRs, chest PT. Course complicated by fevers, altered mental status, fever workup obtained, CXR showing left lung consolidation/effusion, urinalysis negative, blood cultures negative. OR cultures growing enterococcus, infectious disease team consulted, antibiotics adjusted. PICC line was placed on 6/30, no complications. Patient was diuresed as necessary with lasix for fluid overload. Nasogastric tube was removed on 7/1. Wound vac changed 3 times/wk. Uptrending leukocytosis, CT chest AP was obtained on 7/2 showing small bowel malrotation, early/partial bowel obstruction vs post op ileus, small bilateral pleural effusions w/ bibasilar atelectasis. Transfused 1U PRBC for hemoglobin 7.7, responded appropriately. Diet was advanced as tolerated and per return of bowel function. Chest tube was removed on 7/3, repeat CXR with significant improvement. Patient was taken by interventional radiology team for perihepatic aspiration of serous fluid on 7/3. Patient returned to the OR on 7/5 for wound dehiscence for exlap, washout, ileocolic resection with end ileostomy, blow hole colostomy, fistula, red rubber from ileostomy to small bowel anastomosis; vicryl bridging mesh; R JOYCE below ileostomy, L JOYCE at small bowel enterotomy repair. SICU for post op monitoring.  Abdominal fluid growing C. tertium/Lactobacillus, antibiotics adjusted per ID team. Course complicated by post op fever, OR cultures growing rare yeast, started fluconazole. Successfully extubated on 7/6. Worked with ostomy nurses. Uncontrolled HTN, medications adjusted accordingly, transitioned to cardene gtt. Bedside US showing small bilateral pleural effusions, continued to be diuresed as necessary. TPN restarted, NGT and red rubber removed on 7/10, diet advanced as tolerated, cleared by speech and swallow evaluation for cough. Continued wound vac changes. Slight uptrending WBC post op, surveillance blood cultures sent, urine lytes sent, consistent with intrinsic renal disease. Echo obtained showing mild LVH w/ EF 65-70%. Liver function tests uptrending, ultrasound of abdomen completed showing no evidence of portal or hepatic venous thrombosis, fluid/sludge distended GB, no cholelithiasis or cholecystitis. Fever workup completed on 7/22 for Tmax 101. CT head/chest/abdomen/pelvis was obtained on 7/23 showing persistent ECF, Bilateral pleural effusions L>R. No acute intracranial hemorrhage. Blood cxs/MRSA/staph negative. Fistula had increasing output, wound care followed patient, imodium started for high output. Course complicated by acute delirium, all potential causes discontinued, stepped up to ICU care for further management, PICC line removed on 8/23, started empiric IV antibiotics for potential line sepsis. CT head obtained on 8/23 - normal scan, no hemorrhage or infarcts. Infectious workup obtained, blood cultures negative. TSH elevated, given IV synthroid. ICU course significant for hypoglycemia, hyponatremia, fevers - workup continued to be negative; elevated liver function tests. RUQ US obtained 8/26 showing distended gallbladder with sludge, no wall thickening/pericholecystic fluid, notable for right pleural effusion. CTAP obtained showing increased small R pleural effusion w/ atelectasis, no pneumoperitoneum. Diet advanced as tolerated, PICC and blood cultures remained negative. Patient clinically improved, was stepped down to telemetry on 8/28.      At the time of discharge, patient was hemodynamically stable to be discharged to __ with ___.    ***LAST UPDATED 8/29***     78M w/ Crohn's, AFib/Flutter s/p DCCVs, remote ileocectomy and open appy, presented to [facility] with abdominal pain, outpatient CT concerning for dilated loops of small bowel and stomach, admitted for resolving SBO vs Crohn's flare, placed a nasogastric tube for decompression, plan for nonoperative management with serial abdominal exams and close hemodynamic monitoring. Course significant for uncontrolled hypertension, medicine team followed throughout their hospital stay, antihypertensives given accordingly. Given no progression of bowel function, decision was made to take patient to the OR on 6/26 for laparoscopic lysis of adhesions, converted to open lysis of adhesions, SBR x 3 and left in discontinuity, temporary abdominal closure. Transferred to SICU post op for hemodynamic monitoring, remained intubated. Course complicated by right sided pneumothorax, thoracic surgery was consulted, placed chest tube on 6/27. Patient returned to the OR on 6/28 for ex lap, removal of abthera, ileocolic resection, small bowel anastamosis, abdominal wall closure. Post operative hemoglobin downtrended to 8.5 from 9, given 1U PRBC. Started on TPN for nutritional optimization and successfully extubated to high flow NC on 6/29. Respiratory status continued to be monitored closely with serial CXRs, chest PT. Course complicated by fevers, altered mental status, fever workup obtained, CXR showing left lung consolidation/effusion, urinalysis negative, blood cultures negative. OR cultures growing enterococcus, infectious disease team consulted, antibiotics adjusted. PICC line was placed on 6/30, no complications. Patient was diuresed as necessary with lasix for fluid overload. Nasogastric tube was removed on 7/1. Wound vac changed 3 times/wk. Uptrending leukocytosis, CT chest AP was obtained on 7/2 showing small bowel malrotation, early/partial bowel obstruction vs post op ileus, small bilateral pleural effusions w/ bibasilar atelectasis. Transfused 1U PRBC for hemoglobin 7.7, responded appropriately. Diet was advanced as tolerated and per return of bowel function. Chest tube was removed on 7/3, repeat CXR with significant improvement. Patient was taken by interventional radiology team for perihepatic aspiration of serous fluid on 7/3. Patient returned to the OR on 7/5 for wound dehiscence for exlap, washout, ileocolic resection with end ileostomy, blow hole colostomy, fistula, red rubber from ileostomy to small bowel anastomosis; vicryl bridging mesh; R JOYCE below ileostomy, L JOYCE at small bowel enterotomy repair. SICU for post op monitoring.  Abdominal fluid growing C. tertium/Lactobacillus, antibiotics adjusted per ID team. Course complicated by post op fever, OR cultures growing rare yeast, started fluconazole. Successfully extubated on 7/6. Worked with ostomy nurses. Uncontrolled HTN, medications adjusted accordingly, transitioned to cardene gtt. Bedside US showing small bilateral pleural effusions, continued to be diuresed as necessary. TPN restarted, NGT and red rubber removed on 7/10, diet advanced as tolerated, cleared by speech and swallow evaluation for cough. Continued wound vac changes. Slight uptrending WBC post op, surveillance blood cultures sent, urine lytes sent, consistent with intrinsic renal disease. Echo obtained showing mild LVH w/ EF 65-70%. Liver function tests uptrending, ultrasound of abdomen completed showing no evidence of portal or hepatic venous thrombosis, fluid/sludge distended GB, no cholelithiasis or cholecystitis. Fever workup completed on 7/22 for Tmax 101. CT head/chest/abdomen/pelvis was obtained on 7/23 showing persistent ECF, Bilateral pleural effusions L>R. No acute intracranial hemorrhage. Blood cxs/MRSA/staph negative. Fistula had increasing output, wound care followed patient, imodium started for high output. Course complicated by acute delirium, all potential causes discontinued, stepped up to ICU care for further management, PICC line removed on 8/23, started empiric IV antibiotics for potential line sepsis. CT head obtained on 8/23 - normal scan, no hemorrhage or infarcts. Infectious workup obtained, blood cultures negative. TSH elevated, given IV synthroid. ICU course significant for hypoglycemia, hyponatremia, fevers - workup continued to be negative; elevated liver function tests. RUQ US obtained 8/26 showing distended gallbladder with sludge, no wall thickening/pericholecystic fluid, notable for right pleural effusion. CTAP obtained showing increased small R pleural effusion w/ atelectasis, no pneumoperitoneum. Diet advanced as tolerated, PICC and blood cultures remained negative. Patient clinically improved, was stepped down to telemetry on 8/28.      At the time of discharge, patient was hemodynamically stable to be discharged to __ with ___.    ***LAST UPDATED 8/29***     78M w/ Crohn's, AFib/Flutter s/p DCCVs, remote ileocectomy and open appy, presented to [facility] with abdominal pain, outpatient CT concerning for dilated loops of small bowel and stomach, admitted for resolving SBO vs Crohn's flare, placed a nasogastric tube for decompression, plan for nonoperative management with serial abdominal exams and close hemodynamic monitoring. Course significant for uncontrolled hypertension, medicine team followed throughout their hospital stay, antihypertensives given accordingly. Given no progression of bowel function, decision was made to take patient to the OR on 6/26 for laparoscopic lysis of adhesions, converted to open lysis of adhesions, SBR x 3 and left in discontinuity, temporary abdominal closure. Transferred to SICU post op for hemodynamic monitoring, remained intubated. Course complicated by right sided pneumothorax, thoracic surgery was consulted, placed chest tube on 6/27. Patient returned to the OR on 6/28 for ex lap, removal of abthera, ileocolic resection, small bowel anastamosis, abdominal wall closure. Post operative hemoglobin downtrended to 8.5 from 9, given 1U PRBC. Started on TPN for nutritional optimization and successfully extubated to high flow NC on 6/29. Respiratory status continued to be monitored closely with serial CXRs, chest PT. Course complicated by fevers, altered mental status, fever workup obtained, CXR showing left lung consolidation/effusion, urinalysis negative, blood cultures negative. OR cultures growing enterococcus, infectious disease team consulted, antibiotics adjusted. PICC line was placed on 6/30, no complications. Patient was diuresed as necessary with lasix for fluid overload. Nasogastric tube was removed on 7/1. Wound vac changed 3 times/wk. Uptrending leukocytosis, CT chest AP was obtained on 7/2 showing small bowel malrotation, early/partial bowel obstruction vs post op ileus, small bilateral pleural effusions w/ bibasilar atelectasis. Transfused 1U PRBC for hemoglobin 7.7, responded appropriately. Diet was advanced as tolerated and per return of bowel function. Chest tube was removed on 7/3, repeat CXR with significant improvement. Patient was taken by interventional radiology team for perihepatic aspiration of serous fluid on 7/3. Patient returned to the OR on 7/5 for wound dehiscence for exlap, washout, ileocolic resection with end ileostomy, blow hole colostomy, fistula, red rubber from ileostomy to small bowel anastomosis; vicryl bridging mesh; R OJYCE below ileostomy, L JOYCE at small bowel enterotomy repair. SICU for post op monitoring.  Abdominal fluid growing C. tertium/Lactobacillus, antibiotics adjusted per ID team. Course complicated by post op fever, OR cultures growing rare yeast, started fluconazole. Successfully extubated on 7/6. Worked with ostomy nurses. Uncontrolled HTN, medications adjusted accordingly, transitioned to cardene gtt. Bedside US showing small bilateral pleural effusions, continued to be diuresed as necessary. TPN restarted, NGT and red rubber removed on 7/10, diet advanced as tolerated, cleared by speech and swallow evaluation for cough. Continued wound vac changes. Slight uptrending WBC post op, surveillance blood cultures sent, urine lytes sent, consistent with intrinsic renal disease. Echo obtained showing mild LVH w/ EF 65-70%. Liver function tests uptrending, ultrasound of abdomen completed showing no evidence of portal or hepatic venous thrombosis, fluid/sludge distended GB, no cholelithiasis or cholecystitis. Fever workup completed on 7/22 for Tmax 101. CT head/chest/abdomen/pelvis was obtained on 7/23 showing persistent ECF, Bilateral pleural effusions L>R. No acute intracranial hemorrhage. Blood cxs/MRSA/staph negative. Fistula had increasing output, wound care followed patient, imodium started for high output. Course complicated by acute delirium, all potential causes discontinued, stepped up to ICU care for further management, PICC line removed on 8/23, started empiric IV antibiotics for potential line sepsis. CT head obtained on 8/23 - normal scan, no hemorrhage or infarcts. Infectious workup obtained, blood cultures negative. TSH elevated, given IV synthroid. ICU course significant for hypoglycemia, hyponatremia, fevers - workup continued to be negative; elevated liver function tests. RUQ US obtained 8/26 showing distended gallbladder with sludge, no wall thickening/pericholecystic fluid, notable for right pleural effusion. CTAP obtained showing increased small R pleural effusion w/ atelectasis, no pneumoperitoneum. Diet advanced as tolerated, PICC and blood cultures remained negative. Patient clinically improved, was stepped down to telemetry on 8/28. Imodium increased per high ostomy output. PICC line placed on 9/1, TPN started for nutritional optimization. Patient was stepped up to ICU for severe ELVI, treated accordingly, mccauley placed. Restarted octreotide and lomotil for persistent high ostomy output. Mccauley removed on 9/3. Stepped down to telemetry when medically stable. Diet advanced as tolerated. Nephrology team consulted, urine lytes confirms SIADH. Course complicated by altered mental status, neurology team consulted, workup obtained. EEG obtained on 9/11: continuous mild generalized  background slowing suggestive of a similar degree of diffuse or multifocal  dysfunction; No epileptiform activity and no significant clinical events occurred.       At the time of discharge, patient was hemodynamically stable to be discharged to __ with ___.    ***LAST UPDATED 8/29***      9/19: TPN reordered w/ dec protein. Allowed sips of clears.  O/N: AMRITA.  9/18: Vac leaking. Meclizine started for nausea upon standing. WTD dressings now.   O/N: AMRITA.  9/17: TPN reordered. Vac leaking and replaced. but still leaking  9/16: Vac leak fixed. TPN reordered. Hypertensive got labetalol 10 mg.  ON: AMRITA.   9/15: 1L LR x1 to replete yesterdays output. TPN reordered. Vac leak - changed. Imipenem dc.  O/N: AMRITA  9/14: TPN reordered. OOBTC.  ON: 500cc bolus given for fistula/wound vac output overnight.   9/13: TPN reordered. Metoprolol, PPI and syntroid changed to PO. Vac changed, bile cx NGTD for 48hrs. ID says c/w imipenem until 9/15.   O/N: vac leak - patched. BG 77 - 1/2 AMP D50 given,   9/12: given 1.5L for fistula/wound vac output yesterday, change seroquel to qhs PRN insomnia, worked with PT/OT  ON: mental status improved/at baseline. Cystatin C 2.19, eGFR 26. IR Cx - no organisms, no WBC.  9/11: UA wnl, TPN ordered, EEG wnl, consented for perc deb. Dec imipenem to 500mg q8. Perc Choley done 180cc of bilious fluid. Sent for cx. SQH timed. EEG d/c, seroquil 25mg given for bedtime    ON: Trop 58 (57), no changes. Agitation: Restraints placed and Precedex started. Repeat Covid - Neg. IR Perc Deb ordered and preop done. Metop made IV given poor mental status. Agitation: started Precedex, then pulling lines and attempting OOB despite max Precedex--gave Haldol 2mg IV w improvement. MAPs 50s: on Levo gtt for 1 hour, [id number] ordered (but UOP 500cc and came off Levo) so bolus not given. Instead gave 1 U pRBC, for Hg 7.1 w pending IR.  9/10: EEG ordered, CT scan of abd and pelvis with PO contrast and and contrast through Fistula shows . TSH, Repeat BMP@11:___ Liver US: shows distended gallbladder with sludge, no signs of acute deb however since distention and bili of 3 will get Perc Choley on monday . Vac changed. Effexor dced, gave 2L IVF. ID consulted Will start Imipenem 1g q8. RRC placed in ileostomy.  ON: stepped up to SICU for AMS, episodes of hypoglycemia to 40s-50s, Afib with RVR to 140-150s, given Metop 5mg IVP with resolution; sepsis work up - CXR _____, ABG 7.45/36/105/25, WBC 14.34 (12.6) Hgb 7.4 (8.3), Na 132, K 5.0, Cl 100, Bicarb 24 (18), BUN/Cr downtrending, Albumin 1.8,  Lactate 1.0, Ammonia ______, UA neg, UStudies ______; EKG with new ST depressions, Troponin 57, per cards likely secondary to Afib with RVR, cont to trend Trop and EKG q4-6h; Wound Vac with small air leak but holds suction. Given 1L NS bolus and EEG ordered as per neuro recs  ---------------------------------------SICU----------------------------------------  ON: rpt BMP K5.7, bicarb 19, ammonia 40 (wnl); calc gluc/d50/insulin, 500cc NS bolus, FS 58; 1/2 amp D50, . FS 68; 1/2 amp D50 given, Afib HR 150s; ekg st depressions II + V5, AMS worsened, stepped up to SICU  9/9: cut K from TPN, 1L LR bolus, AOx4 but illogical thought processes; neuro consult + CBC + BMP + UA + Urine lytes, K5.8 ekg/calc gluc/D50/insulin, neuro says hepatic encephalopathy, hypoglycemia 46; d50 given, rpt , 1L NS bolus,   ON: PM BMP 5.8. Calcium gluconate+insulin+D50 given. Repeat K 5.1 ECG: Rate controlled AFIB. 78M w/ Crohn's, AFib/Flutter s/p DCCVs, remote ileocectomy and open appy, presented to [facility] with abdominal pain, outpatient CT concerning for dilated loops of small bowel and stomach, admitted for resolving SBO vs Crohn's flare, placed a nasogastric tube for decompression, plan for nonoperative management with serial abdominal exams and close hemodynamic monitoring. Course significant for uncontrolled hypertension, medicine team followed throughout their hospital stay, antihypertensives given accordingly. Given no progression of bowel function, decision was made to take patient to the OR on 6/26 for laparoscopic lysis of adhesions, converted to open lysis of adhesions, SBR x 3 and left in discontinuity, temporary abdominal closure. Transferred to SICU post op for hemodynamic monitoring, remained intubated. Course complicated by right sided pneumothorax, thoracic surgery was consulted, placed chest tube on 6/27. Patient returned to the OR on 6/28 for ex lap, removal of abthera, ileocolic resection, small bowel anastamosis, abdominal wall closure. Post operative hemoglobin downtrended to 8.5 from 9, given 1U PRBC. Started on TPN for nutritional optimization and successfully extubated to high flow NC on 6/29. Respiratory status continued to be monitored closely with serial CXRs, chest PT. Course complicated by fevers, altered mental status, fever workup obtained, CXR showing left lung consolidation/effusion, urinalysis negative, blood cultures negative. OR cultures growing enterococcus, infectious disease team consulted, antibiotics adjusted. PICC line was placed on 6/30, no complications. Patient was diuresed as necessary with lasix for fluid overload. Nasogastric tube was removed on 7/1. Wound vac changed 3 times/wk. Uptrending leukocytosis, CT chest AP was obtained on 7/2 showing small bowel malrotation, early/partial bowel obstruction vs post op ileus, small bilateral pleural effusions w/ bibasilar atelectasis. Transfused 1U PRBC for hemoglobin 7.7, responded appropriately. Diet was advanced as tolerated and per return of bowel function. Chest tube was removed on 7/3, repeat CXR with significant improvement. Patient was taken by interventional radiology team for perihepatic aspiration of serous fluid on 7/3. Patient returned to the OR on 7/5 for wound dehiscence for exlap, washout, ileocolic resection with end ileostomy, blow hole colostomy, fistula, red rubber from ileostomy to small bowel anastomosis; vicryl bridging mesh; R JOYCE below ileostomy, L JOYCE at small bowel enterotomy repair. SICU for post op monitoring.  Abdominal fluid growing C. tertium/Lactobacillus, antibiotics adjusted per ID team. Course complicated by post op fever, OR cultures growing rare yeast, started fluconazole. Successfully extubated on 7/6. Worked with ostomy nurses. Uncontrolled HTN, medications adjusted accordingly, transitioned to cardene gtt. Bedside US showing small bilateral pleural effusions, continued to be diuresed as necessary. TPN restarted, NGT and red rubber removed on 7/10, diet advanced as tolerated, cleared by speech and swallow evaluation for cough. Continued wound vac changes. Slight uptrending WBC post op, surveillance blood cultures sent, urine lytes sent, consistent with intrinsic renal disease. Echo obtained showing mild LVH w/ EF 65-70%. Liver function tests uptrending, ultrasound of abdomen completed showing no evidence of portal or hepatic venous thrombosis, fluid/sludge distended GB, no cholelithiasis or cholecystitis. Fever workup completed on 7/22 for Tmax 101. CT head/chest/abdomen/pelvis was obtained on 7/23 showing persistent ECF, Bilateral pleural effusions L>R. No acute intracranial hemorrhage. Blood cxs/MRSA/staph negative. Fistula had increasing output, wound care followed patient, imodium started for high output. Course complicated by acute delirium, all potential causes discontinued, stepped up to ICU care for further management, PICC line removed on 8/23, started empiric IV antibiotics for potential line sepsis. CT head obtained on 8/23 - normal scan, no hemorrhage or infarcts. Infectious workup obtained, blood cultures negative. TSH elevated, given IV synthroid. ICU course significant for hypoglycemia, hyponatremia, fevers - workup continued to be negative; elevated liver function tests. RUQ US obtained 8/26 showing distended gallbladder with sludge, no wall thickening/pericholecystic fluid, notable for right pleural effusion. CTAP obtained showing increased small R pleural effusion w/ atelectasis, no pneumoperitoneum. Diet advanced as tolerated, PICC and blood cultures remained negative. Patient clinically improved, was stepped down to telemetry on 8/28. Imodium increased per high ostomy output. PICC line placed on 9/1, TPN started for nutritional optimization. Patient was stepped up to ICU for severe ELVI, treated accordingly, mccauley placed. Restarted octreotide and lomotil for persistent high ostomy output. Mccauley removed on 9/3. Stepped down to telemetry when medically stable. Diet advanced as tolerated. Nephrology team consulted, urine lytes confirms SIADH. Course complicated by altered mental status, neurology team consulted, workup obtained. EEG obtained on 9/11: continuous mild generalized  background slowing suggestive of a similar degree of diffuse or multifocal  dysfunction; No epileptiform activity and no significant clinical events occurred.       At the time of discharge, patient was hemodynamically stable to be discharged to __ with ___.    ***LAST UPDATED 8/29***      9/19: TPN reordered w/ dec protein. Allowed sips of clears.  O/N: AMRITA.  9/18: Vac leaking. Meclizine started for nausea upon standing. WTD dressings now.   O/N: AMRITA.  9/17: TPN reordered. Vac leaking and replaced. but still leaking  9/16: Vac leak fixed. TPN reordered. Hypertensive got labetalol 10 mg.  ON: AMRITA.   9/15: 1L LR x1 to replete yesterdays output. TPN reordered. Vac leak - changed. Imipenem dc.  O/N: AMRITA  9/14: TPN reordered. OOBTC.  ON: 500cc bolus given for fistula/wound vac output overnight.   9/13: TPN reordered. Metoprolol, PPI and syntroid changed to PO. Vac changed, bile cx NGTD for 48hrs. ID says c/w imipenem until 9/15.   O/N: vac leak - patched. BG 77 - 1/2 AMP D50 given,   9/12: given 1.5L for fistula/wound vac output yesterday, change seroquel to qhs PRN insomnia, worked with PT/OT  ON: mental status improved/at baseline. Cystatin C 2.19, eGFR 26. IR Cx - no organisms, no WBC.  9/11: UA wnl, TPN ordered, EEG wnl, consented for perc deb. Dec imipenem to 500mg q8. Perc Choley done 180cc of bilious fluid. Sent for cx. SQH timed. EEG d/c, seroquil 25mg given for bedtime    ON: Trop 58 (57), no changes. Agitation: Restraints placed and Precedex started. Repeat Covid - Neg. IR Perc Deb ordered and preop done. Metop made IV given poor mental status. Agitation: started Precedex, then pulling lines and attempting OOB despite max Precedex--gave Haldol 2mg IV w improvement. MAPs 50s: on Levo gtt for 1 hour, [id number] ordered (but UOP 500cc and came off Levo) so bolus not given. Instead gave 1 U pRBC, for Hg 7.1 w pending IR.  9/10: EEG ordered, CT scan of abd and pelvis with PO contrast and and contrast through Fistula shows . TSH, Repeat BMP@11:___ Liver US: shows distended gallbladder with sludge, no signs of acute deb however since distention and bili of 3 will get Perc Choley on monday . Vac changed. Effexor dced, gave 2L IVF. ID consulted Will start Imipenem 1g q8. RRC placed in ileostomy.  ON: stepped up to SICU for AMS, episodes of hypoglycemia to 40s-50s, Afib with RVR to 140-150s, given Metop 5mg IVP with resolution; sepsis work up - CXR _____, ABG 7.45/36/105/25, WBC 14.34 (12.6) Hgb 7.4 (8.3), Na 132, K 5.0, Cl 100, Bicarb 24 (18), BUN/Cr downtrending, Albumin 1.8,  Lactate 1.0, Ammonia ______, UA neg, UStudies ______; EKG with new ST depressions, Troponin 57, per cards likely secondary to Afib with RVR, cont to trend Trop and EKG q4-6h; Wound Vac with small air leak but holds suction. Given 1L NS bolus and EEG ordered as per neuro recs  ---------------------------------------SICU----------------------------------------  ON: rpt BMP K5.7, bicarb 19, ammonia 40 (wnl); calc gluc/d50/insulin, 500cc NS bolus, FS 58; 1/2 amp D50, . FS 68; 1/2 amp D50 given, Afib HR 150s; ekg st depressions II + V5, AMS worsened, stepped up to SICU  9/9: cut K from TPN, 1L LR bolus, AOx4 but illogical thought processes; neuro consult + CBC + BMP + UA + Urine lytes, K5.8 ekg/calc gluc/D50/insulin, neuro says hepatic encephalopathy, hypoglycemia 46; d50 given, rpt , 1L NS bolus,   ON: PM BMP 5.8. Calcium gluconate+insulin+D50 given. Repeat K 5.1 ECG: Rate controlled AFIB. 78M w/ Crohn's, AFib/Flutter s/p DCCVs, remote ileocectomy and open appy, presented to [facility] with abdominal pain, outpatient CT concerning for dilated loops of small bowel and stomach, admitted for resolving SBO vs Crohn's flare, placed a nasogastric tube for decompression, plan for nonoperative management with serial abdominal exams and close hemodynamic monitoring. Course significant for uncontrolled hypertension, medicine team followed throughout their hospital stay, antihypertensives given accordingly. Given no progression of bowel function, decision was made to take patient to the OR on 6/26 for laparoscopic lysis of adhesions, converted to open lysis of adhesions, SBR x 3 and left in discontinuity, temporary abdominal closure. Transferred to SICU post op for hemodynamic monitoring, remained intubated. Course complicated by right sided pneumothorax, thoracic surgery was consulted, placed chest tube on 6/27. Patient returned to the OR on 6/28 for ex lap, removal of abthera, ileocolic resection, small bowel anastamosis, abdominal wall closure. Post operative hemoglobin downtrended to 8.5 from 9, given 1U PRBC. Started on TPN for nutritional optimization and successfully extubated to high flow NC on 6/29. Respiratory status continued to be monitored closely with serial CXRs, chest PT. Course complicated by fevers, altered mental status, fever workup obtained, CXR showing left lung consolidation/effusion, urinalysis negative, blood cultures negative. OR cultures growing enterococcus, infectious disease team consulted, antibiotics adjusted. PICC line was placed on 6/30, no complications. Patient was diuresed as necessary with lasix for fluid overload. Nasogastric tube was removed on 7/1. Wound vac changed 3 times/wk. Uptrending leukocytosis, CT chest AP was obtained on 7/2 showing small bowel malrotation, early/partial bowel obstruction vs post op ileus, small bilateral pleural effusions w/ bibasilar atelectasis. Transfused 1U PRBC for hemoglobin 7.7, responded appropriately. Diet was advanced as tolerated and per return of bowel function. Chest tube was removed on 7/3, repeat CXR with significant improvement. Patient was taken by interventional radiology team for perihepatic aspiration of serous fluid on 7/3. Patient returned to the OR on 7/5 for wound dehiscence for exlap, washout, ileocolic resection with end ileostomy, blow hole colostomy, fistula, red rubber from ileostomy to small bowel anastomosis; vicryl bridging mesh; R JOYCE below ileostomy, L JOYCE at small bowel enterotomy repair. SICU for post op monitoring.  Abdominal fluid growing C. tertium/Lactobacillus, antibiotics adjusted per ID team. Course complicated by post op fever, OR cultures growing rare yeast, started fluconazole. Successfully extubated on 7/6. Worked with ostomy nurses. Uncontrolled HTN, medications adjusted accordingly, transitioned to cardene gtt. Bedside US showing small bilateral pleural effusions, continued to be diuresed as necessary. TPN restarted, NGT and red rubber removed on 7/10, diet advanced as tolerated, cleared by speech and swallow evaluation for cough. Continued wound vac changes. Slight uptrending WBC post op, surveillance blood cultures sent, urine lytes sent, consistent with intrinsic renal disease. Echo obtained showing mild LVH w/ EF 65-70%. Liver function tests uptrending, ultrasound of abdomen completed showing no evidence of portal or hepatic venous thrombosis, fluid/sludge distended GB, no cholelithiasis or cholecystitis. Fever workup completed on 7/22 for Tmax 101. CT head/chest/abdomen/pelvis was obtained on 7/23 showing persistent ECF, Bilateral pleural effusions L>R. No acute intracranial hemorrhage. Blood cxs/MRSA/staph negative. Fistula had increasing output, wound care followed patient, imodium started for high output. Course complicated by acute delirium, all potential causes discontinued, stepped up to ICU care for further management, PICC line removed on 8/23, started empiric IV antibiotics for potential line sepsis. CT head obtained on 8/23 - normal scan, no hemorrhage or infarcts. Infectious workup obtained, blood cultures negative. TSH elevated, given IV synthroid. ICU course significant for hypoglycemia, hyponatremia, fevers - workup continued to be negative; elevated liver function tests. RUQ US obtained 8/26 showing distended gallbladder with sludge, no wall thickening/pericholecystic fluid, notable for right pleural effusion. CTAP obtained showing increased small R pleural effusion w/ atelectasis, no pneumoperitoneum. Diet advanced as tolerated, PICC and blood cultures remained negative. Patient clinically improved, was stepped down to telemetry on 8/28. Imodium increased per high ostomy output. PICC line placed on 9/1, TPN started for nutritional optimization. Patient was stepped up to ICU for severe ELVI, treated accordingly, mccauley placed. Restarted octreotide and lomotil for persistent high ostomy output. Mccauley removed on 9/3. Stepped down to telemetry when medically stable. Diet advanced as tolerated. Nephrology team consulted, urine lytes confirms SIADH. Course complicated by altered mental status, neurology team consulted, workup obtained. EEG obtained on 9/11: continuous mild generalized  background slowing suggestive of a similar degree of diffuse or multifocal  dysfunction; No epileptiform activity and no significant clinical events occurred.       At the time of discharge, patient was hemodynamically stable to be discharged to __ with ___.    ***LAST UPDATED 8/29***      9/19: TPN reordered w/ dec protein. Allowed sips of clears.  O/N: AMRITA.  9/18: Vac leaking. Meclizine started for nausea upon standing. WTD dressings now.   O/N: AMRITA.  9/17: TPN reordered. Vac leaking and replaced. but still leaking  9/16: Vac leak fixed. TPN reordered. Hypertensive got labetalol 10 mg.  ON: AMRITA.   9/15: 1L LR x1 to replete yesterdays output. TPN reordered. Vac leak - changed. Imipenem dc.  O/N: AMRITA  9/14: TPN reordered. OOBTC.  ON: 500cc bolus given for fistula/wound vac output overnight.   9/13: TPN reordered. Metoprolol, PPI and syntroid changed to PO. Vac changed, bile cx NGTD for 48hrs. ID says c/w imipenem until 9/15.   O/N: vac leak - patched. BG 77 - 1/2 AMP D50 given,   9/12: given 1.5L for fistula/wound vac output yesterday, change seroquel to qhs PRN insomnia, worked with PT/OT  ON: mental status improved/at baseline. Cystatin C 2.19, eGFR 26. IR Cx - no organisms, no WBC.  9/11: UA wnl, TPN ordered, EEG wnl, consented for perc deb. Dec imipenem to 500mg q8. Perc Choley done 180cc of bilious fluid. Sent for cx. SQH timed. EEG d/c, seroquil 25mg given for bedtime    ON: Trop 58 (57), no changes. Agitation: Restraints placed and Precedex started. Repeat Covid - Neg. IR Perc Deb ordered and preop done. Metop made IV given poor mental status. Agitation: started Precedex, then pulling lines and attempting OOB despite max Precedex--gave Haldol 2mg IV w improvement. MAPs 50s: on Levo gtt for 1 hour, [id number] ordered (but UOP 500cc and came off Levo) so bolus not given. Instead gave 1 U pRBC, for Hg 7.1 w pending IR.  9/10: EEG ordered, CT scan of abd and pelvis with PO contrast and and contrast through Fistula shows . TSH, Repeat BMP@11:___ Liver US: shows distended gallbladder with sludge, no signs of acute deb however since distention and bili of 3 will get Perc Choley on monday . Vac changed. Effexor dced, gave 2L IVF. ID consulted Will start Imipenem 1g q8. RRC placed in ileostomy.  ON: stepped up to SICU for AMS, episodes of hypoglycemia to 40s-50s, Afib with RVR to 140-150s, given Metop 5mg IVP with resolution; sepsis work up - CXR _____, ABG 7.45/36/105/25, WBC 14.34 (12.6) Hgb 7.4 (8.3), Na 132, K 5.0, Cl 100, Bicarb 24 (18), BUN/Cr downtrending, Albumin 1.8,  Lactate 1.0, Ammonia ______, UA neg, UStudies ______; EKG with new ST depressions, Troponin 57, per cards likely secondary to Afib with RVR, cont to trend Trop and EKG q4-6h; Wound Vac with small air leak but holds suction. Given 1L NS bolus and EEG ordered as per neuro recs  ---------------------------------------SICU----------------------------------------  ON: rpt BMP K5.7, bicarb 19, ammonia 40 (wnl); calc gluc/d50/insulin, 500cc NS bolus, FS 58; 1/2 amp D50, . FS 68; 1/2 amp D50 given, Afib HR 150s; ekg st depressions II + V5, AMS worsened, stepped up to SICU  9/9: cut K from TPN, 1L LR bolus, AOx4 but illogical thought processes; neuro consult + CBC + BMP + UA + Urine lytes, K5.8 ekg/calc gluc/D50/insulin, neuro says hepatic encephalopathy, hypoglycemia 46; d50 given, rpt , 1L NS bolus,   ON: PM BMP 5.8. Calcium gluconate+insulin+D50 given. Repeat K 5.1 ECG: Rate controlled AFIB. 78M w/ Crohn's, AFib/Flutter s/p DCCVs, remote ileocectomy and open appy, presented to [facility] with abdominal pain, outpatient CT concerning for dilated loops of small bowel and stomach, admitted for resolving SBO vs Crohn's flare, placed a nasogastric tube for decompression, plan for nonoperative management with serial abdominal exams and close hemodynamic monitoring. Course significant for uncontrolled hypertension, medicine team followed throughout their hospital stay, antihypertensives given accordingly. Given no progression of bowel function, decision was made to take patient to the OR on 6/26 for laparoscopic lysis of adhesions, converted to open lysis of adhesions, SBR x 3 and left in discontinuity, temporary abdominal closure. Transferred to SICU post op for hemodynamic monitoring, remained intubated. Course complicated by right sided pneumothorax, thoracic surgery was consulted, placed chest tube on 6/27. Patient returned to the OR on 6/28 for ex lap, removal of abthera, ileocolic resection, small bowel anastamosis, abdominal wall closure. Post operative hemoglobin downtrended to 8.5 from 9, given 1U PRBC. Started on TPN for nutritional optimization and successfully extubated to high flow NC on 6/29. Respiratory status continued to be monitored closely with serial CXRs, chest PT. Course complicated by fevers, altered mental status, fever workup obtained, CXR showing left lung consolidation/effusion, urinalysis negative, blood cultures negative. OR cultures growing enterococcus, infectious disease team consulted, antibiotics adjusted. PICC line was placed on 6/30, no complications. Patient was diuresed as necessary with lasix for fluid overload. Nasogastric tube was removed on 7/1. Wound vac changed 3 times/wk. Uptrending leukocytosis, CT chest AP was obtained on 7/2 showing small bowel malrotation, early/partial bowel obstruction vs post op ileus, small bilateral pleural effusions w/ bibasilar atelectasis. Transfused 1U PRBC for hemoglobin 7.7, responded appropriately. Diet was advanced as tolerated and per return of bowel function. Chest tube was removed on 7/3, repeat CXR with significant improvement. Patient was taken by interventional radiology team for perihepatic aspiration of serous fluid on 7/3. Patient returned to the OR on 7/5 for wound dehiscence for exlap, washout, ileocolic resection with end ileostomy, blow hole colostomy, fistula, red rubber from ileostomy to small bowel anastomosis; vicryl bridging mesh; R JOYCE below ileostomy, L JOYCE at small bowel enterotomy repair. SICU for post op monitoring.  Abdominal fluid growing C. tertium/Lactobacillus, antibiotics adjusted per ID team. Course complicated by post op fever, OR cultures growing rare yeast, started fluconazole. Successfully extubated on 7/6. Worked with ostomy nurses. Uncontrolled HTN, medications adjusted accordingly, transitioned to cardene gtt. Bedside US showing small bilateral pleural effusions, continued to be diuresed as necessary. TPN restarted, NGT and red rubber removed on 7/10, diet advanced as tolerated, cleared by speech and swallow evaluation for cough. Continued wound vac changes. Slight uptrending WBC post op, surveillance blood cultures sent, urine lytes sent, consistent with intrinsic renal disease. Echo obtained showing mild LVH w/ EF 65-70%. Liver function tests uptrending, ultrasound of abdomen completed showing no evidence of portal or hepatic venous thrombosis, fluid/sludge distended GB, no cholelithiasis or cholecystitis. Fever workup completed on 7/22 for Tmax 101. CT head/chest/abdomen/pelvis was obtained on 7/23 showing persistent ECF, Bilateral pleural effusions L>R. No acute intracranial hemorrhage. Blood cxs/MRSA/staph negative. Fistula had increasing output, wound care followed patient, imodium started for high output. Course complicated by acute delirium, all potential causes discontinued, stepped up to ICU care for further management, PICC line removed on 8/23, started empiric IV antibiotics for potential line sepsis. CT head obtained on 8/23 - normal scan, no hemorrhage or infarcts. Infectious workup obtained, blood cultures negative. TSH elevated, given IV synthroid. ICU course significant for hypoglycemia, hyponatremia, fevers - workup continued to be negative; elevated liver function tests. RUQ US obtained 8/26 showing distended gallbladder with sludge, no wall thickening/pericholecystic fluid, notable for right pleural effusion. CTAP obtained showing increased small R pleural effusion w/ atelectasis, no pneumoperitoneum. Diet advanced as tolerated, PICC and blood cultures remained negative. Patient clinically improved, was stepped down to telemetry on 8/28. Imodium increased per high ostomy output. PICC line placed on 9/1, TPN started for nutritional optimization. Patient was stepped up to ICU for severe ELVI, treated accordingly, mccauley placed. Restarted octreotide and lomotil for persistent high ostomy output. Mccauley removed on 9/3. Stepped down to telemetry when medically stable. Diet advanced as tolerated. Nephrology team consulted, urine lytes confirms SIADH. Course complicated by altered mental status, neurology team consulted, workup obtained. CT chest AP on 9/10 showing mild pulmonary edema, atelectasis vs pneumonia, no bowel obstruction, distended gallbladder w/ sludge. EEG obtained on 9/11: continuous mild generalized background slowing suggestive of a similar degree of diffuse or multifocal dysfunction; No epileptiform activity and no significant clinical events occurred. RUQ US consistent with distended gallbladder and small sludge, no cholelithiasis or acute cholecystitis. Taken for percutaneous cholecystectomy on 9/11. Continue IVF resuscitation for high ostomy output. Switched wound vac to wet to dry dressing changes, patient tolerating well.           At the time of discharge, patient was hemodynamically stable to be discharged to __ with ___.      ***LAST UPDATED 9/19*** 78M w/ Crohn's, AFib/Flutter s/p DCCVs, remote ileocectomy and open appy, presented to [facility] with abdominal pain, outpatient CT concerning for dilated loops of small bowel and stomach, admitted for resolving SBO vs Crohn's flare, placed a nasogastric tube for decompression, plan for nonoperative management with serial abdominal exams and close hemodynamic monitoring. Course significant for uncontrolled hypertension, medicine team followed throughout their hospital stay, antihypertensives given accordingly. Given no progression of bowel function, decision was made to take patient to the OR on 6/26 for laparoscopic lysis of adhesions, converted to open lysis of adhesions, SBR x 3 and left in discontinuity, temporary abdominal closure. Transferred to SICU post op for hemodynamic monitoring, remained intubated. Course complicated by right sided pneumothorax, thoracic surgery was consulted, placed chest tube on 6/27. Patient returned to the OR on 6/28 for ex lap, removal of abthera, ileocolic resection, small bowel anastamosis, abdominal wall closure. Post operative hemoglobin downtrended to 8.5 from 9, given 1U PRBC. Started on TPN for nutritional optimization and successfully extubated to high flow NC on 6/29. Respiratory status continued to be monitored closely with serial CXRs, chest PT. Course complicated by fevers, altered mental status, fever workup obtained, CXR showing left lung consolidation/effusion, urinalysis negative, blood cultures negative. OR cultures growing enterococcus, infectious disease team consulted, antibiotics adjusted. PICC line was placed on 6/30, no complications. Patient was diuresed as necessary with lasix for fluid overload. Nasogastric tube was removed on 7/1. Wound vac changed 3 times/wk. Uptrending leukocytosis, CT chest AP was obtained on 7/2 showing small bowel malrotation, early/partial bowel obstruction vs post op ileus, small bilateral pleural effusions w/ bibasilar atelectasis. Transfused 1U PRBC for hemoglobin 7.7, responded appropriately. Diet was advanced as tolerated and per return of bowel function. Chest tube was removed on 7/3, repeat CXR with significant improvement. Patient was taken by interventional radiology team for perihepatic aspiration of serous fluid on 7/3. Patient returned to the OR on 7/5 for wound dehiscence for exlap, washout, ileocolic resection with end ileostomy, blow hole colostomy, fistula, red rubber from ileostomy to small bowel anastomosis; vicryl bridging mesh; R JOYCE below ileostomy, L JOYCE at small bowel enterotomy repair. SICU for post op monitoring.  Abdominal fluid growing C. tertium/Lactobacillus, antibiotics adjusted per ID team. Course complicated by post op fever, OR cultures growing rare yeast, started fluconazole. Successfully extubated on 7/6. Worked with ostomy nurses. Uncontrolled HTN, medications adjusted accordingly, transitioned to cardene gtt. Bedside US showing small bilateral pleural effusions, continued to be diuresed as necessary. TPN restarted, NGT and red rubber removed on 7/10, diet advanced as tolerated, cleared by speech and swallow evaluation for cough. Continued wound vac changes. Slight uptrending WBC post op, surveillance blood cultures sent, urine lytes sent, consistent with intrinsic renal disease. Echo obtained showing mild LVH w/ EF 65-70%. Liver function tests uptrending, ultrasound of abdomen completed showing no evidence of portal or hepatic venous thrombosis, fluid/sludge distended GB, no cholelithiasis or cholecystitis. Fever workup completed on 7/22 for Tmax 101. CT head/chest/abdomen/pelvis was obtained on 7/23 showing persistent ECF, Bilateral pleural effusions L>R. No acute intracranial hemorrhage. Blood cxs/MRSA/staph negative. Fistula had increasing output, wound care followed patient, imodium started for high output. Course complicated by acute delirium, all potential causes discontinued, stepped up to ICU care for further management, PICC line removed on 8/23, started empiric IV antibiotics for potential line sepsis. CT head obtained on 8/23 - normal scan, no hemorrhage or infarcts. Infectious workup obtained, blood cultures negative. TSH elevated, given IV synthroid. ICU course significant for hypoglycemia, hyponatremia, fevers - workup continued to be negative; elevated liver function tests. RUQ US obtained 8/26 showing distended gallbladder with sludge, no wall thickening/pericholecystic fluid, notable for right pleural effusion. CTAP obtained showing increased small R pleural effusion w/ atelectasis, no pneumoperitoneum. Diet advanced as tolerated, PICC and blood cultures remained negative. Patient clinically improved, was stepped down to telemetry on 8/28. Imodium increased per high ostomy output. PICC line placed on 9/1, TPN started for nutritional optimization. Patient was stepped up to ICU for severe ELVI, treated accordingly, mccauley placed. Restarted octreotide and lomotil for persistent high ostomy output. Mccauley removed on 9/3. Stepped down to telemetry when medically stable. Diet advanced as tolerated. Nephrology team consulted, urine lytes confirms SIADH. Course complicated by altered mental status, neurology team consulted, workup obtained. CT chest AP on 9/10 showing mild pulmonary edema, atelectasis vs pneumonia, no bowel obstruction, distended gallbladder w/ sludge. EEG obtained on 9/11: continuous mild generalized background slowing suggestive of a similar degree of diffuse or multifocal dysfunction; No epileptiform activity and no significant clinical events occurred. RUQ US consistent with distended gallbladder and small sludge, no cholelithiasis or acute cholecystitis. Taken for percutaneous cholecystectomy on 9/11. Continue IVF resuscitation for high ostomy output. Switched wound vac to wet to dry dressing changes, patient tolerating well.           At the time of discharge, patient was hemodynamically stable to be discharged to __ with ___.      ***LAST UPDATED 9/19*** 78M w/ Crohn's, AFib/Flutter s/p DCCVs, remote ileocectomy and open appy, presented to [facility] with abdominal pain, outpatient CT concerning for dilated loops of small bowel and stomach, admitted for resolving SBO vs Crohn's flare, placed a nasogastric tube for decompression, plan for nonoperative management with serial abdominal exams and close hemodynamic monitoring. Course significant for uncontrolled hypertension, medicine team followed throughout their hospital stay, antihypertensives given accordingly. Given no progression of bowel function, decision was made to take patient to the OR on 6/26 for laparoscopic lysis of adhesions, converted to open lysis of adhesions, SBR x 3 and left in discontinuity, temporary abdominal closure. Transferred to SICU post op for hemodynamic monitoring, remained intubated. Course complicated by right sided pneumothorax, thoracic surgery was consulted, placed chest tube on 6/27. Patient returned to the OR on 6/28 for ex lap, removal of abthera, ileocolic resection, small bowel anastamosis, abdominal wall closure. Post operative hemoglobin downtrended to 8.5 from 9, given 1U PRBC. Started on TPN for nutritional optimization and successfully extubated to high flow NC on 6/29. Respiratory status continued to be monitored closely with serial CXRs, chest PT. Course complicated by fevers, altered mental status, fever workup obtained, CXR showing left lung consolidation/effusion, urinalysis negative, blood cultures negative. OR cultures growing enterococcus, infectious disease team consulted, antibiotics adjusted. PICC line was placed on 6/30, no complications. Patient was diuresed as necessary with lasix for fluid overload. Nasogastric tube was removed on 7/1. Wound vac changed 3 times/wk. Uptrending leukocytosis, CT chest AP was obtained on 7/2 showing small bowel malrotation, early/partial bowel obstruction vs post op ileus, small bilateral pleural effusions w/ bibasilar atelectasis. Transfused 1U PRBC for hemoglobin 7.7, responded appropriately. Diet was advanced as tolerated and per return of bowel function. Chest tube was removed on 7/3, repeat CXR with significant improvement. Patient was taken by interventional radiology team for perihepatic aspiration of serous fluid on 7/3. Patient returned to the OR on 7/5 for wound dehiscence for exlap, washout, ileocolic resection with end ileostomy, blow hole colostomy, fistula, red rubber from ileostomy to small bowel anastomosis; vicryl bridging mesh; R JOYCE below ileostomy, L JOYCE at small bowel enterotomy repair. SICU for post op monitoring.  Abdominal fluid growing C. tertium/Lactobacillus, antibiotics adjusted per ID team. Course complicated by post op fever, OR cultures growing rare yeast, started fluconazole. Successfully extubated on 7/6. Worked with ostomy nurses. Uncontrolled HTN, medications adjusted accordingly, transitioned to cardene gtt. Bedside US showing small bilateral pleural effusions, continued to be diuresed as necessary. TPN restarted, NGT and red rubber removed on 7/10, diet advanced as tolerated, cleared by speech and swallow evaluation for cough. Continued wound vac changes. Slight uptrending WBC post op, surveillance blood cultures sent, urine lytes sent, consistent with intrinsic renal disease. Echo obtained showing mild LVH w/ EF 65-70%. Liver function tests uptrending, ultrasound of abdomen completed showing no evidence of portal or hepatic venous thrombosis, fluid/sludge distended GB, no cholelithiasis or cholecystitis. Fever workup completed on 7/22 for Tmax 101. CT head/chest/abdomen/pelvis was obtained on 7/23 showing persistent ECF, Bilateral pleural effusions L>R. No acute intracranial hemorrhage. Blood cxs/MRSA/staph negative. Fistula had increasing output, wound care followed patient, imodium started for high output. Course complicated by acute delirium, all potential causes discontinued, stepped up to ICU care for further management, PICC line removed on 8/23, started empiric IV antibiotics for potential line sepsis. CT head obtained on 8/23 - normal scan, no hemorrhage or infarcts. Infectious workup obtained, blood cultures negative. TSH elevated, given IV synthroid. ICU course significant for hypoglycemia, hyponatremia, fevers - workup continued to be negative; elevated liver function tests. RUQ US obtained 8/26 showing distended gallbladder with sludge, no wall thickening/pericholecystic fluid, notable for right pleural effusion. CTAP obtained showing increased small R pleural effusion w/ atelectasis, no pneumoperitoneum. Diet advanced as tolerated, PICC and blood cultures remained negative. Patient clinically improved, was stepped down to telemetry on 8/28. Imodium increased per high ostomy output. PICC line placed on 9/1, TPN started for nutritional optimization. Patient was stepped up to ICU for severe ELVI, treated accordingly, mccauley placed. Restarted octreotide and lomotil for persistent high ostomy output. Mccauley removed on 9/3. Stepped down to telemetry when medically stable. Diet advanced as tolerated. Nephrology team consulted, urine lytes confirms SIADH. Course complicated by altered mental status, neurology team consulted, workup obtained. CT chest AP on 9/10 showing mild pulmonary edema, atelectasis vs pneumonia, no bowel obstruction, distended gallbladder w/ sludge. EEG obtained on 9/11: continuous mild generalized background slowing suggestive of a similar degree of diffuse or multifocal dysfunction; No epileptiform activity and no significant clinical events occurred. RUQ US consistent with distended gallbladder and small sludge, no cholelithiasis or acute cholecystitis. Taken for percutaneous cholecystectomy on 9/11. Continue IVF resuscitation for high ostomy output. Switched wound vac to wet to dry dressing changes, patient tolerating well.           At the time of discharge, patient was hemodynamically stable to be discharged to __ with ___.      ***LAST UPDATED 9/19*** 78M w/ Crohn's, AFib/Flutter s/p DCCVs, remote ileocectomy and open appy, presented to [facility] with abdominal pain, outpatient CT concerning for dilated loops of small bowel and stomach, admitted for resolving SBO vs Crohn's flare, placed a nasogastric tube for decompression, plan for nonoperative management with serial abdominal exams and close hemodynamic monitoring. Course significant for uncontrolled hypertension, medicine team followed throughout their hospital stay, antihypertensives given accordingly. Given no progression of bowel function, decision was made to take patient to the OR on 6/26 for laparoscopic lysis of adhesions, converted to open lysis of adhesions, SBR x 3 and left in discontinuity, temporary abdominal closure. Transferred to SICU post op for hemodynamic monitoring, remained intubated. Course complicated by right sided pneumothorax, thoracic surgery was consulted, placed chest tube on 6/27. Patient returned to the OR on 6/28 for ex lap, removal of abthera, ileocolic resection, small bowel anastamosis, abdominal wall closure. Post operative hemoglobin downtrended to 8.5 from 9, given 1U PRBC. Started on TPN for nutritional optimization and successfully extubated to high flow NC on 6/29. Respiratory status continued to be monitored closely with serial CXRs, chest PT. Course complicated by fevers, altered mental status, fever workup obtained, CXR showing left lung consolidation/effusion, urinalysis negative, blood cultures negative. OR cultures growing enterococcus, infectious disease team consulted, antibiotics adjusted. PICC line was placed on 6/30, no complications. Patient was diuresed as necessary with lasix for fluid overload. Nasogastric tube was removed on 7/1. Wound vac changed 3 times/wk. Uptrending leukocytosis, CT chest AP was obtained on 7/2 showing small bowel malrotation, early/partial bowel obstruction vs post op ileus, small bilateral pleural effusions w/ bibasilar atelectasis. Transfused 1U PRBC for hemoglobin 7.7, responded appropriately. Diet was advanced as tolerated and per return of bowel function. Chest tube was removed on 7/3, repeat CXR with significant improvement. Patient was taken by interventional radiology team for perihepatic aspiration of serous fluid on 7/3. Patient returned to the OR on 7/5 for wound dehiscence for exlap, washout, ileocolic resection with end ileostomy, blow hole colostomy, fistula, red rubber from ileostomy to small bowel anastomosis; vicryl bridging mesh; R JOYCE below ileostomy, L JOYCE at small bowel enterotomy repair. SICU for post op monitoring.  Abdominal fluid growing C. tertium/Lactobacillus, antibiotics adjusted per ID team. Course complicated by post op fever, OR cultures growing rare yeast, started fluconazole. Successfully extubated on 7/6. Worked with ostomy nurses. Uncontrolled HTN, medications adjusted accordingly, transitioned to cardene gtt. Bedside US showing small bilateral pleural effusions, continued to be diuresed as necessary. TPN restarted, NGT and red rubber removed on 7/10, diet advanced as tolerated, cleared by speech and swallow evaluation for cough. Continued wound vac changes. Slight uptrending WBC post op, surveillance blood cultures sent, urine lytes sent, consistent with intrinsic renal disease. Echo obtained showing mild LVH w/ EF 65-70%. Liver function tests uptrending, ultrasound of abdomen completed showing no evidence of portal or hepatic venous thrombosis, fluid/sludge distended GB, no cholelithiasis or cholecystitis. Fever workup completed on 7/22 for Tmax 101. CT head/chest/abdomen/pelvis was obtained on 7/23 showing persistent ECF, Bilateral pleural effusions L>R. No acute intracranial hemorrhage. Blood cxs/MRSA/staph negative. Fistula had increasing output, wound care followed patient, imodium started for high output. Course complicated by acute delirium, all potential causes discontinued, stepped up to ICU care for further management, PICC line removed on 8/23, started empiric IV antibiotics for potential line sepsis. CT head obtained on 8/23 - normal scan, no hemorrhage or infarcts. Infectious workup obtained, blood cultures negative. TSH elevated, given IV synthroid. ICU course significant for hypoglycemia, hyponatremia, fevers - workup continued to be negative; elevated liver function tests. RUQ US obtained 8/26 showing distended gallbladder with sludge, no wall thickening/pericholecystic fluid, notable for right pleural effusion. CTAP obtained showing increased small R pleural effusion w/ atelectasis, no pneumoperitoneum. Diet advanced as tolerated, PICC and blood cultures remained negative. Patient clinically improved, was stepped down to telemetry on 8/28. Imodium increased per high ostomy output. PICC line placed on 9/1, TPN started for nutritional optimization. Patient was stepped up to ICU for severe ELVI, treated accordingly, mccauley placed. Restarted octreotide and lomotil for persistent high ostomy output. Mccauley removed on 9/3. Stepped down to telemetry when medically stable. Diet advanced as tolerated. Nephrology team consulted, urine lytes confirms SIADH. Course complicated by altered mental status, neurology team consulted, workup obtained. CT chest AP on 9/10 showing mild pulmonary edema, atelectasis vs pneumonia, no bowel obstruction, distended gallbladder w/ sludge. EEG obtained on 9/11: continuous mild generalized background slowing suggestive of a similar degree of diffuse or multifocal dysfunction; No epileptiform activity and no significant clinical events occurred. RUQ US consistent with distended gallbladder and small sludge, no cholelithiasis or acute cholecystitis. Taken for percutaneous cholecystectomy on 9/11. Continue IVF resuscitation for high ostomy output. Switched wound vac to wet to dry dressing changes, patient tolerating well.           At the time of discharge, patient was hemodynamically stable to be discharged to __ with ___.      ***LAST UPDATED 9/19***   78M w/ Crohn's, AFib/Flutter s/p DCCVs, remote ileocectomy and open appy, presented to [facility] with abdominal pain, outpatient CT concerning for dilated loops of small bowel and stomach, admitted for resolving SBO vs Crohn's flare, placed a nasogastric tube for decompression, plan for nonoperative management with serial abdominal exams and close hemodynamic monitoring. Course significant for uncontrolled hypertension, medicine team followed throughout their hospital stay, antihypertensives given accordingly. Given no progression of bowel function, decision was made to take patient to the OR on 6/26 for laparoscopic lysis of adhesions, converted to open lysis of adhesions, SBR x 3 and left in discontinuity, temporary abdominal closure. Transferred to SICU post op for hemodynamic monitoring, remained intubated. Course complicated by right sided pneumothorax, thoracic surgery was consulted, placed chest tube on 6/27. Patient returned to the OR on 6/28 for ex lap, removal of abthera, ileocolic resection, small bowel anastamosis, abdominal wall closure. Post operative hemoglobin downtrended to 8.5 from 9, given 1U PRBC. Started on TPN for nutritional optimization and successfully extubated to high flow NC on 6/29. Respiratory status continued to be monitored closely with serial CXRs, chest PT. Course complicated by fevers, altered mental status, fever workup obtained, CXR showing left lung consolidation/effusion, urinalysis negative, blood cultures negative. OR cultures growing enterococcus, infectious disease team consulted, antibiotics adjusted. PICC line was placed on 6/30, no complications. Patient was diuresed as necessary with lasix for fluid overload. Nasogastric tube was removed on 7/1. Wound vac changed 3 times/wk. Uptrending leukocytosis, CT chest AP was obtained on 7/2 showing small bowel malrotation, early/partial bowel obstruction vs post op ileus, small bilateral pleural effusions w/ bibasilar atelectasis. Transfused 1U PRBC for hemoglobin 7.7, responded appropriately. Diet was advanced as tolerated and per return of bowel function. Chest tube was removed on 7/3, repeat CXR with significant improvement. Patient was taken by interventional radiology team for perihepatic aspiration of serous fluid on 7/3. Patient returned to the OR on 7/5 for wound dehiscence for exlap, washout, ileocolic resection with end ileostomy, blow hole colostomy, fistula, red rubber from ileostomy to small bowel anastomosis; vicryl bridging mesh; R JOYCE below ileostomy, L JOYCE at small bowel enterotomy repair. SICU for post op monitoring.  Abdominal fluid growing C. tertium/Lactobacillus, antibiotics adjusted per ID team. Course complicated by post op fever, OR cultures growing rare yeast, started fluconazole. Successfully extubated on 7/6. Worked with ostomy nurses. Uncontrolled HTN, medications adjusted accordingly, transitioned to cardene gtt. Bedside US showing small bilateral pleural effusions, continued to be diuresed as necessary. TPN restarted, NGT and red rubber removed on 7/10, diet advanced as tolerated, cleared by speech and swallow evaluation for cough. Continued wound vac changes. Slight uptrending WBC post op, surveillance blood cultures sent, urine lytes sent, consistent with intrinsic renal disease. Echo obtained showing mild LVH w/ EF 65-70%. Liver function tests uptrending, ultrasound of abdomen completed showing no evidence of portal or hepatic venous thrombosis, fluid/sludge distended GB, no cholelithiasis or cholecystitis. Fever workup completed on 7/22 for Tmax 101. CT head/chest/abdomen/pelvis was obtained on 7/23 showing persistent ECF, Bilateral pleural effusions L>R. No acute intracranial hemorrhage. Blood cxs/MRSA/staph negative. Fistula had increasing output, wound care followed patient, imodium started for high output. Course complicated by acute delirium, all potential causes discontinued, stepped up to ICU care for further management, PICC line removed on 8/23, started empiric IV antibiotics for potential line sepsis. CT head obtained on 8/23 - normal scan, no hemorrhage or infarcts. Infectious workup obtained, blood cultures negative. TSH elevated, given IV synthroid. ICU course significant for hypoglycemia, hyponatremia, fevers - workup continued to be negative; elevated liver function tests. RUQ US obtained 8/26 showing distended gallbladder with sludge, no wall thickening/pericholecystic fluid, notable for right pleural effusion. CTAP obtained showing increased small R pleural effusion w/ atelectasis, no pneumoperitoneum. Diet advanced as tolerated, PICC and blood cultures remained negative. Patient clinically improved, was stepped down to telemetry on 8/28. Imodium increased per high ostomy output. PICC line placed on 9/1, TPN started for nutritional optimization. Patient was stepped up to ICU for severe ELVI, treated accordingly, mccauley placed. Restarted octreotide and lomotil for persistent high ostomy output. Mccauley removed on 9/3. Stepped down to telemetry when medically stable. Diet advanced as tolerated. Nephrology team consulted, urine lytes confirms SIADH. Course complicated by altered mental status, neurology team consulted, workup obtained. CT chest AP on 9/10 showing mild pulmonary edema, atelectasis vs pneumonia, no bowel obstruction, distended gallbladder w/ sludge. EEG obtained on 9/11: continuous mild generalized background slowing suggestive of a similar degree of diffuse or multifocal dysfunction; No epileptiform activity and no significant clinical events occurred. RUQ US consistent with distended gallbladder and small sludge, no cholelithiasis or acute cholecystitis. Taken for percutaneous cholecystectomy on 9/11. Continue IVF resuscitation for high ostomy output. Switched wound vac to wet to dry dressing changes, patient tolerating well.           At the time of discharge, patient was hemodynamically stable to be discharged to __ with ___.      ***LAST UPDATED 9/19***   78M w/ Crohn's, AFib/Flutter s/p DCCVs, remote ileocectomy and open appy, presented to [facility] with abdominal pain, outpatient CT concerning for dilated loops of small bowel and stomach, admitted for resolving SBO vs Crohn's flare, placed a nasogastric tube for decompression, plan for nonoperative management with serial abdominal exams and close hemodynamic monitoring. Course significant for uncontrolled hypertension, medicine team followed throughout their hospital stay, antihypertensives given accordingly. Given no progression of bowel function, decision was made to take patient to the OR on 6/26 for laparoscopic lysis of adhesions, converted to open lysis of adhesions, SBR x 3 and left in discontinuity, temporary abdominal closure. Transferred to SICU post op for hemodynamic monitoring, remained intubated. Course complicated by right sided pneumothorax, thoracic surgery was consulted, placed chest tube on 6/27. Patient returned to the OR on 6/28 for ex lap, removal of abthera, ileocolic resection, small bowel anastamosis, abdominal wall closure. Post operative hemoglobin downtrended to 8.5 from 9, given 1U PRBC. Started on TPN for nutritional optimization and successfully extubated to high flow NC on 6/29. Respiratory status continued to be monitored closely with serial CXRs, chest PT. Course complicated by fevers, altered mental status, fever workup obtained, CXR showing left lung consolidation/effusion, urinalysis negative, blood cultures negative. OR cultures growing enterococcus, infectious disease team consulted, antibiotics adjusted. PICC line was placed on 6/30, no complications. Patient was diuresed as necessary with lasix for fluid overload. Nasogastric tube was removed on 7/1. Wound vac changed 3 times/wk. Uptrending leukocytosis, CT chest AP was obtained on 7/2 showing small bowel malrotation, early/partial bowel obstruction vs post op ileus, small bilateral pleural effusions w/ bibasilar atelectasis. Transfused 1U PRBC for hemoglobin 7.7, responded appropriately. Diet was advanced as tolerated and per return of bowel function. Chest tube was removed on 7/3, repeat CXR with significant improvement. Patient was taken by interventional radiology team for perihepatic aspiration of serous fluid on 7/3. Patient returned to the OR on 7/5 for wound dehiscence for exlap, washout, ileocolic resection with end ileostomy, blow hole colostomy, fistula, red rubber from ileostomy to small bowel anastomosis; vicryl bridging mesh; R JOYCE below ileostomy, L JOYCE at small bowel enterotomy repair. SICU for post op monitoring.  Abdominal fluid growing C. tertium/Lactobacillus, antibiotics adjusted per ID team. Course complicated by post op fever, OR cultures growing rare yeast, started fluconazole. Successfully extubated on 7/6. Worked with ostomy nurses. Uncontrolled HTN, medications adjusted accordingly, transitioned to cardene gtt. Bedside US showing small bilateral pleural effusions, continued to be diuresed as necessary. TPN restarted, NGT and red rubber removed on 7/10, diet advanced as tolerated, cleared by speech and swallow evaluation for cough. Continued wound vac changes. Slight uptrending WBC post op, surveillance blood cultures sent, urine lytes sent, consistent with intrinsic renal disease. Echo obtained showing mild LVH w/ EF 65-70%. Liver function tests uptrending, ultrasound of abdomen completed showing no evidence of portal or hepatic venous thrombosis, fluid/sludge distended GB, no cholelithiasis or cholecystitis. Fever workup completed on 7/22 for Tmax 101. CT head/chest/abdomen/pelvis was obtained on 7/23 showing persistent ECF, Bilateral pleural effusions L>R. No acute intracranial hemorrhage. Blood cxs/MRSA/staph negative. Fistula had increasing output, wound care followed patient, imodium started for high output. Course complicated by acute delirium, all potential causes discontinued, stepped up to ICU care for further management, PICC line removed on 8/23, started empiric IV antibiotics for potential line sepsis. CT head obtained on 8/23 - normal scan, no hemorrhage or infarcts. Infectious workup obtained, blood cultures negative. TSH elevated, given IV synthroid. ICU course significant for hypoglycemia, hyponatremia, fevers - workup continued to be negative; elevated liver function tests. RUQ US obtained 8/26 showing distended gallbladder with sludge, no wall thickening/pericholecystic fluid, notable for right pleural effusion. CTAP obtained showing increased small R pleural effusion w/ atelectasis, no pneumoperitoneum. Diet advanced as tolerated, PICC and blood cultures remained negative. Patient clinically improved, was stepped down to telemetry on 8/28. Imodium increased per high ostomy output. PICC line placed on 9/1, TPN started for nutritional optimization. Patient was stepped up to ICU for severe ELVI, treated accordingly, mccauley placed. Restarted octreotide and lomotil for persistent high ostomy output. Mccauley removed on 9/3. Stepped down to telemetry when medically stable. Diet advanced as tolerated. Nephrology team consulted, urine lytes confirms SIADH. Course complicated by altered mental status, neurology team consulted, workup obtained. CT chest AP on 9/10 showing mild pulmonary edema, atelectasis vs pneumonia, no bowel obstruction, distended gallbladder w/ sludge. EEG obtained on 9/11: continuous mild generalized background slowing suggestive of a similar degree of diffuse or multifocal dysfunction; No epileptiform activity and no significant clinical events occurred. RUQ US consistent with distended gallbladder and small sludge, no cholelithiasis or acute cholecystitis. Taken for percutaneous cholecystectomy on 9/11. Continue IVF resuscitation for high ostomy output. Switched wound vac to wet to dry dressing changes, patient tolerating well.           At the time of discharge, patient was hemodynamically stable to be discharged to __ with ___.      ***LAST UPDATED 9/19***   78M w/ Crohn's, AFib/Flutter s/p DCCVs, remote ileocectomy and open appy, presented to [facility] with abdominal pain, outpatient CT concerning for dilated loops of small bowel and stomach, admitted for resolving SBO vs Crohn's flare, placed a nasogastric tube for decompression, plan for nonoperative management with serial abdominal exams and close hemodynamic monitoring. Course significant for uncontrolled hypertension, medicine team followed throughout their hospital stay, antihypertensives given accordingly. Given no progression of bowel function, decision was made to take patient to the OR on 6/26 for laparoscopic lysis of adhesions, converted to open lysis of adhesions, SBR x 3 and left in discontinuity, temporary abdominal closure. Transferred to SICU post op for hemodynamic monitoring, remained intubated. Course complicated by right sided pneumothorax, thoracic surgery was consulted, placed chest tube on 6/27. Patient returned to the OR on 6/28 for ex lap, removal of abthera, ileocolic resection, small bowel anastomosis, abdominal wall closure. Post operative hemoglobin downtrended to 8.5 from 9, given 1U PRBC. Started on TPN for nutritional optimization and successfully extubated to high flow NC on 6/29. Respiratory status continued to be monitored closely with serial CXRs, chest PT. Course complicated by fevers, altered mental status, fever workup obtained, CXR showing left lung consolidation/effusion, urinalysis negative, blood cultures negative. OR cultures growing enterococcus, infectious disease team consulted, antibiotics adjusted. PICC line was placed on 6/30, no complications. Patient was diuresed as necessary with lasix for fluid overload. Nasogastric tube was removed on 7/1. Wound vac changed 3 times/wk. Uptrending leukocytosis, CT chest AP was obtained on 7/2 showing small bowel malrotation, early/partial bowel obstruction vs post op ileus, small bilateral pleural effusions w/ bibasilar atelectasis. Transfused 1U PRBC for hemoglobin 7.7, responded appropriately. Diet was advanced as tolerated and per return of bowel function. Chest tube was removed on 7/3, repeat CXR with significant improvement. Patient was taken by interventional radiology team for perihepatic aspiration of serous fluid on 7/3. Patient returned to the OR on 7/5 for wound dehiscence for exlap, washout, ileocolic resection with end ileostomy, blow hole colostomy, fistula, red rubber from ileostomy to small bowel anastomosis; vicryl bridging mesh; R JOYCE below ileostomy, L JOYCE at small bowel enterotomy repair. SICU for post op monitoring.  Abdominal fluid growing C. tertium/Lactobacillus, antibiotics adjusted per ID team. Course complicated by post op fever, OR cultures growing rare yeast, started fluconazole. Successfully extubated on 7/6. Worked with ostomy nurses. Uncontrolled HTN, medications adjusted accordingly, transitioned to cardene gtt. Bedside US showing small bilateral pleural effusions, continued to be diuresed as necessary. TPN restarted, NGT and red rubber removed on 7/10, diet advanced as tolerated, cleared by speech and swallow evaluation for cough. Continued wound vac changes. Slight uptrending WBC post op, surveillance blood cultures sent, urine lytes sent, consistent with intrinsic renal disease. Echo obtained showing mild LVH w/ EF 65-70%. Liver function tests uptrending, ultrasound of abdomen completed showing no evidence of portal or hepatic venous thrombosis, fluid/sludge distended GB, no cholelithiasis or cholecystitis. Fever workup completed on 7/22 for Tmax 101. CT head/chest/abdomen/pelvis was obtained on 7/23 showing persistent ECF, Bilateral pleural effusions L>R. No acute intracranial hemorrhage. Blood cxs/MRSA/staph negative. Fistula had increasing output, wound care followed patient, imodium started for high output. Course complicated by acute delirium, all potential causes discontinued, stepped up to ICU care for further management, PICC line removed on 8/23, started empiric IV antibiotics for potential line sepsis. CT head obtained on 8/23 - normal scan, no hemorrhage or infarcts. Infectious workup obtained, blood cultures negative. TSH elevated, given IV synthroid. ICU course significant for hypoglycemia, hyponatremia, fevers - workup continued to be negative; elevated liver function tests. RUQ US obtained 8/26 showing distended gallbladder with sludge, no wall thickening/pericholecystic fluid, notable for right pleural effusion. CTAP obtained showing increased small R pleural effusion w/ atelectasis, no pneumoperitoneum. Diet advanced as tolerated, PICC and blood cultures remained negative. Patient clinically improved, was stepped down to telemetry on 8/28. Imodium increased per high ostomy output. PICC line placed on 9/1, TPN started for nutritional optimization. Patient was stepped up to ICU for severe ELVI, treated accordingly, mccauley placed. Restarted octreotide and lomotil for persistent high ostomy output. Mccauley removed on 9/3. Stepped down to telemetry when medically stable. Diet advanced as tolerated. Nephrology team consulted, urine lytes confirms SIADH. Course complicated by altered mental status, neurology team consulted, workup obtained. CT chest AP on 9/10 showing mild pulmonary edema, atelectasis vs pneumonia, no bowel obstruction, distended gallbladder w/ sludge. EEG obtained on 9/11: continuous mild generalized background slowing suggestive of a similar degree of diffuse or multifocal dysfunction; No epileptiform activity and no significant clinical events occurred. RUQ US consistent with distended gallbladder and small sludge, no cholelithiasis or acute cholecystitis. Taken for percutaneous cholecystectomy on 9/11. Continue IVF resuscitation for high ostomy output. Switched wound vac to wet to dry dressing changes, patient tolerating well. 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 AXR showing delayed gastric emptying given contrast remains in stomach, started erythromycin for gastroparesis. Wound care team consulted, switched wet to dry dressings to wound manager, restarted imodium.         At the time of discharge, patient was hemodynamically stable to be discharged to __ with ___.      ***LAST UPDATED 9/26**   78M w/ Crohn's, AFib/Flutter s/p DCCVs, remote ileocectomy and open appy, presented to [facility] with abdominal pain, outpatient CT concerning for dilated loops of small bowel and stomach, admitted for resolving SBO vs Crohn's flare, placed a nasogastric tube for decompression, plan for nonoperative management with serial abdominal exams and close hemodynamic monitoring. Course significant for uncontrolled hypertension, medicine team followed throughout their hospital stay, antihypertensives given accordingly. Given no progression of bowel function, decision was made to take patient to the OR on 6/26 for laparoscopic lysis of adhesions, converted to open lysis of adhesions, SBR x 3 and left in discontinuity, temporary abdominal closure. Transferred to SICU post op for hemodynamic monitoring, remained intubated. Course complicated by right sided pneumothorax, thoracic surgery was consulted, placed chest tube on 6/27. Patient returned to the OR on 6/28 for ex lap, removal of abthera, ileocolic resection, small bowel anastomosis, abdominal wall closure. Post operative hemoglobin downtrended to 8.5 from 9, given 1U PRBC. Started on TPN for nutritional optimization and successfully extubated to high flow NC on 6/29. Respiratory status continued to be monitored closely with serial CXRs, chest PT. Course complicated by fevers, altered mental status, fever workup obtained, CXR showing left lung consolidation/effusion, urinalysis negative, blood cultures negative. OR cultures growing enterococcus, infectious disease team consulted, antibiotics adjusted. PICC line was placed on 6/30, no complications. Patient was diuresed as necessary with lasix for fluid overload. Nasogastric tube was removed on 7/1. Wound vac changed 3 times/wk. Uptrending leukocytosis, CT chest AP was obtained on 7/2 showing small bowel malrotation, early/partial bowel obstruction vs post op ileus, small bilateral pleural effusions w/ bibasilar atelectasis. Transfused 1U PRBC for hemoglobin 7.7, responded appropriately. Diet was advanced as tolerated and per return of bowel function. Chest tube was removed on 7/3, repeat CXR with significant improvement. Patient was taken by interventional radiology team for perihepatic aspiration of serous fluid on 7/3. Patient returned to the OR on 7/5 for wound dehiscence for exlap, washout, ileocolic resection with end ileostomy, blow hole colostomy, fistula, red rubber from ileostomy to small bowel anastomosis; vicryl bridging mesh; R JOYCE below ileostomy, L JOYCE at small bowel enterotomy repair. SICU for post op monitoring.  Abdominal fluid growing C. tertium/Lactobacillus, antibiotics adjusted per ID team. Course complicated by post op fever, OR cultures growing rare yeast, started fluconazole. Successfully extubated on 7/6. Worked with ostomy nurses. Uncontrolled HTN, medications adjusted accordingly, transitioned to cardene gtt. Bedside US showing small bilateral pleural effusions, continued to be diuresed as necessary. TPN restarted, NGT and red rubber removed on 7/10, diet advanced as tolerated, cleared by speech and swallow evaluation for cough. Continued wound vac changes. Slight uptrending WBC post op, surveillance blood cultures sent, urine lytes sent, consistent with intrinsic renal disease. Echo obtained showing mild LVH w/ EF 65-70%. Liver function tests uptrending, ultrasound of abdomen completed showing no evidence of portal or hepatic venous thrombosis, fluid/sludge distended GB, no cholelithiasis or cholecystitis. Fever workup completed on 7/22 for Tmax 101. CT head/chest/abdomen/pelvis was obtained on 7/23 showing persistent ECF, Bilateral pleural effusions L>R. No acute intracranial hemorrhage. Blood cxs/MRSA/staph negative. Fistula had increasing output, wound care followed patient, imodium started for high output. Course complicated by acute delirium, all potential causes discontinued, stepped up to ICU care for further management, PICC line removed on 8/23, started empiric IV antibiotics for potential line sepsis. CT head obtained on 8/23 - normal scan, no hemorrhage or infarcts. Infectious workup obtained, blood cultures negative. TSH elevated, given IV synthroid. ICU course significant for hypoglycemia, hyponatremia, fevers - workup continued to be negative; elevated liver function tests. RUQ US obtained 8/26 showing distended gallbladder with sludge, no wall thickening/pericholecystic fluid, notable for right pleural effusion. CTAP obtained showing increased small R pleural effusion w/ atelectasis, no pneumoperitoneum. Diet advanced as tolerated, PICC and blood cultures remained negative. Patient clinically improved, was stepped down to telemetry on 8/28. Imodium increased per high ostomy output. PICC line placed on 9/1, TPN started for nutritional optimization. Patient was stepped up to ICU for severe ELVI, treated accordingly, mccauley placed. Restarted octreotide and lomotil for persistent high ostomy output. Mccauley removed on 9/3. Stepped down to telemetry when medically stable. Diet advanced as tolerated. Nephrology team consulted, urine lytes confirms SIADH. Course complicated by altered mental status, neurology team consulted, workup obtained. CT chest AP on 9/10 showing mild pulmonary edema, atelectasis vs pneumonia, no bowel obstruction, distended gallbladder w/ sludge. EEG obtained on 9/11: continuous mild generalized background slowing suggestive of a similar degree of diffuse or multifocal dysfunction; No epileptiform activity and no significant clinical events occurred. RUQ US consistent with distended gallbladder and small sludge, no cholelithiasis or acute cholecystitis. Taken for percutaneous cholecystectomy on 9/11. Continue IVF resuscitation for high ostomy output. Switched wound vac to wet to dry dressing changes, patient tolerating well. 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 AXR showing delayed gastric emptying given contrast remains in stomach, started erythromycin for gastroparesis. Wound care team consulted, switched wet to dry dressings to wound manager, restarted imodium.         At the time of discharge, patient was hemodynamically stable to be discharged to __ with ___.      ***LAST UPDATED 9/26**   78M w/ Crohn's, AFib/Flutter s/p DCCVs, remote ileocectomy and open appy, presented to [facility] with abdominal pain, outpatient CT concerning for dilated loops of small bowel and stomach, admitted for resolving SBO vs Crohn's flare, placed a nasogastric tube for decompression, plan for nonoperative management with serial abdominal exams and close hemodynamic monitoring. Course significant for uncontrolled hypertension, medicine team followed throughout their hospital stay, antihypertensives given accordingly. Given no progression of bowel function, decision was made to take patient to the OR on 6/26 for laparoscopic lysis of adhesions, converted to open lysis of adhesions, SBR x 3 and left in discontinuity, temporary abdominal closure. Transferred to SICU post op for hemodynamic monitoring, remained intubated. Course complicated by right sided pneumothorax, thoracic surgery was consulted, placed chest tube on 6/27. Patient returned to the OR on 6/28 for ex lap, removal of abthera, ileocolic resection, small bowel anastomosis, abdominal wall closure. Post operative hemoglobin downtrended to 8.5 from 9, given 1U PRBC. Started on TPN for nutritional optimization and successfully extubated to high flow NC on 6/29. Respiratory status continued to be monitored closely with serial CXRs, chest PT. Course complicated by fevers, altered mental status, fever workup obtained, CXR showing left lung consolidation/effusion, urinalysis negative, blood cultures negative. OR cultures growing enterococcus, infectious disease team consulted, antibiotics adjusted. PICC line was placed on 6/30, no complications. Patient was diuresed as necessary with lasix for fluid overload. Nasogastric tube was removed on 7/1. Wound vac changed 3 times/wk. Uptrending leukocytosis, CT chest AP was obtained on 7/2 showing small bowel malrotation, early/partial bowel obstruction vs post op ileus, small bilateral pleural effusions w/ bibasilar atelectasis. Transfused 1U PRBC for hemoglobin 7.7, responded appropriately. Diet was advanced as tolerated and per return of bowel function. Chest tube was removed on 7/3, repeat CXR with significant improvement. Patient was taken by interventional radiology team for perihepatic aspiration of serous fluid on 7/3. Patient returned to the OR on 7/5 for wound dehiscence for exlap, washout, ileocolic resection with end ileostomy, blow hole colostomy, fistula, red rubber from ileostomy to small bowel anastomosis; vicryl bridging mesh; R JOYCE below ileostomy, L JOYCE at small bowel enterotomy repair. SICU for post op monitoring.  Abdominal fluid growing C. tertium/Lactobacillus, antibiotics adjusted per ID team. Course complicated by post op fever, OR cultures growing rare yeast, started fluconazole. Successfully extubated on 7/6. Worked with ostomy nurses. Uncontrolled HTN, medications adjusted accordingly, transitioned to cardene gtt. Bedside US showing small bilateral pleural effusions, continued to be diuresed as necessary. TPN restarted, NGT and red rubber removed on 7/10, diet advanced as tolerated, cleared by speech and swallow evaluation for cough. Continued wound vac changes. Slight uptrending WBC post op, surveillance blood cultures sent, urine lytes sent, consistent with intrinsic renal disease. Echo obtained showing mild LVH w/ EF 65-70%. Liver function tests uptrending, ultrasound of abdomen completed showing no evidence of portal or hepatic venous thrombosis, fluid/sludge distended GB, no cholelithiasis or cholecystitis. Fever workup completed on 7/22 for Tmax 101. CT head/chest/abdomen/pelvis was obtained on 7/23 showing persistent ECF, Bilateral pleural effusions L>R. No acute intracranial hemorrhage. Blood cxs/MRSA/staph negative. Fistula had increasing output, wound care followed patient, imodium started for high output. Course complicated by acute delirium, all potential causes discontinued, stepped up to ICU care for further management, PICC line removed on 8/23, started empiric IV antibiotics for potential line sepsis. CT head obtained on 8/23 - normal scan, no hemorrhage or infarcts. Infectious workup obtained, blood cultures negative. TSH elevated, given IV synthroid. ICU course significant for hypoglycemia, hyponatremia, fevers - workup continued to be negative; elevated liver function tests. RUQ US obtained 8/26 showing distended gallbladder with sludge, no wall thickening/pericholecystic fluid, notable for right pleural effusion. CTAP obtained showing increased small R pleural effusion w/ atelectasis, no pneumoperitoneum. Diet advanced as tolerated, PICC and blood cultures remained negative. Patient clinically improved, was stepped down to telemetry on 8/28. Imodium increased per high ostomy output. PICC line placed on 9/1, TPN started for nutritional optimization. Patient was stepped up to ICU for severe ELVI, treated accordingly, mccauley placed. Restarted octreotide and lomotil for persistent high ostomy output. Mccauley removed on 9/3. Stepped down to telemetry when medically stable. Diet advanced as tolerated. Nephrology team consulted, urine lytes confirms SIADH. Course complicated by altered mental status, neurology team consulted, workup obtained. CT chest AP on 9/10 showing mild pulmonary edema, atelectasis vs pneumonia, no bowel obstruction, distended gallbladder w/ sludge. EEG obtained on 9/11: continuous mild generalized background slowing suggestive of a similar degree of diffuse or multifocal dysfunction; No epileptiform activity and no significant clinical events occurred. RUQ US consistent with distended gallbladder and small sludge, no cholelithiasis or acute cholecystitis. Taken for percutaneous cholecystectomy on 9/11. Continue IVF resuscitation for high ostomy output. Switched wound vac to wet to dry dressing changes, patient tolerating well. 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 AXR showing delayed gastric emptying given contrast remains in stomach, started erythromycin for gastroparesis. Wound care team consulted, switched wet to dry dressings to wound manager, restarted imodium.         At the time of discharge, patient was hemodynamically stable to be discharged to __ with ___.      ***LAST UPDATED 9/26**   78M w/ Crohn's, AFib/Flutter s/p DCCVs, remote ileocectomy and open appy, presented to [facility] with abdominal pain, outpatient CT concerning for dilated loops of small bowel and stomach, admitted for resolving SBO vs Crohn's flare, placed a nasogastric tube for decompression, plan for nonoperative management with serial abdominal exams and close hemodynamic monitoring. Course significant for uncontrolled hypertension, medicine team followed throughout their hospital stay, antihypertensives given accordingly. Given no progression of bowel function, decision was made to take patient to the OR on 6/26 for laparoscopic lysis of adhesions, converted to open lysis of adhesions, SBR x 3 and left in discontinuity, temporary abdominal closure. Transferred to SICU post op for hemodynamic monitoring, remained intubated. Course complicated by right sided pneumothorax, thoracic surgery was consulted, placed chest tube on 6/27. Patient returned to the OR on 6/28 for ex lap, removal of abthera, ileocolic resection, small bowel anastomosis, abdominal wall closure. Post operative hemoglobin downtrended to 8.5 from 9, given 1U PRBC. Started on TPN for nutritional optimization and successfully extubated to high flow NC on 6/29. Respiratory status continued to be monitored closely with serial CXRs, chest PT. Course complicated by fevers, altered mental status, fever workup obtained, CXR showing left lung consolidation/effusion, urinalysis negative, blood cultures negative. OR cultures growing enterococcus, infectious disease team consulted, antibiotics adjusted. PICC line was placed on 6/30, no complications. Patient was diuresed as necessary with lasix for fluid overload. Nasogastric tube was removed on 7/1. Wound vac changed 3 times/wk. Uptrending leukocytosis, CT chest AP was obtained on 7/2 showing small bowel malrotation, early/partial bowel obstruction vs post op ileus, small bilateral pleural effusions w/ bibasilar atelectasis. Transfused 1U PRBC for hemoglobin 7.7, responded appropriately. Diet was advanced as tolerated and per return of bowel function. Chest tube was removed on 7/3, repeat CXR with significant improvement. Patient was taken by interventional radiology team for perihepatic aspiration of serous fluid on 7/3. Patient returned to the OR on 7/5 for wound dehiscence for exlap, washout, ileocolic resection with end ileostomy, blow hole colostomy, fistula, red rubber from ileostomy to small bowel anastomosis; vicryl bridging mesh; R JOYCE below ileostomy, L JOYCE at small bowel enterotomy repair. SICU for post op monitoring.  Abdominal fluid growing C. tertium/Lactobacillus, antibiotics adjusted per ID team. Course complicated by post op fever, OR cultures growing rare yeast, started fluconazole. Successfully extubated on 7/6. Worked with ostomy nurses. Uncontrolled HTN, medications adjusted accordingly, transitioned to cardene gtt. Bedside US showing small bilateral pleural effusions, continued to be diuresed as necessary. TPN restarted, NGT and red rubber removed on 7/10, diet advanced as tolerated, cleared by speech and swallow evaluation for cough. Continued wound vac changes. Slight uptrending WBC post op, surveillance blood cultures sent, urine lytes sent, consistent with intrinsic renal disease. Echo obtained showing mild LVH w/ EF 65-70%. Liver function tests uptrending, ultrasound of abdomen completed showing no evidence of portal or hepatic venous thrombosis, fluid/sludge distended GB, no cholelithiasis or cholecystitis. Fever workup completed on 7/22 for Tmax 101. CT head/chest/abdomen/pelvis was obtained on 7/23 showing persistent ECF, Bilateral pleural effusions L>R. No acute intracranial hemorrhage. Blood cxs/MRSA/staph negative. Fistula had increasing output, wound care followed patient, imodium started for high output. Course complicated by acute delirium, all potential causes discontinued, stepped up to ICU care for further management, PICC line removed on 8/23, started empiric IV antibiotics for potential line sepsis. CT head obtained on 8/23 - normal scan, no hemorrhage or infarcts. Infectious workup obtained, blood cultures negative. TSH elevated, given IV synthroid. ICU course significant for hypoglycemia, hyponatremia, fevers - workup continued to be negative; elevated liver function tests. RUQ US obtained 8/26 showing distended gallbladder with sludge, no wall thickening/pericholecystic fluid, notable for right pleural effusion. CTAP obtained showing increased small R pleural effusion w/ atelectasis, no pneumoperitoneum. Diet advanced as tolerated, PICC and blood cultures remained negative. Patient clinically improved, was stepped down to telemetry on 8/28. Imodium increased per high ostomy output. PICC line placed on 9/1, TPN started for nutritional optimization. Patient was stepped up to ICU for severe ELVI, treated accordingly, mccauley placed. Restarted octreotide and lomotil for persistent high ostomy output. Mccauley removed on 9/3. Stepped down to telemetry when medically stable. Diet advanced as tolerated. Nephrology team consulted, urine lytes confirms SIADH. Course complicated by altered mental status, neurology team consulted, workup obtained. CT chest AP on 9/10 showing mild pulmonary edema, atelectasis vs pneumonia, no bowel obstruction, distended gallbladder w/ sludge. EEG obtained on 9/11: continuous mild generalized background slowing suggestive of a similar degree of diffuse or multifocal dysfunction; No epileptiform activity and no significant clinical events occurred. RUQ US consistent with distended gallbladder and small sludge, no cholelithiasis or acute cholecystitis. Taken for percutaneous cholecystectomy on 9/11. Continue IVF resuscitation for high ostomy output. Switched wound vac to wet to dry dressing changes, patient tolerating well. 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 AXR showing delayed gastric emptying given contrast remains in stomach, started erythromycin for gastroparesis. Wound care team consulted, switched wet to dry dressings to wound manager, restarted imodium. Patient worked with holistic therapy. Erythromycin eventually decreased and tapered off.       At the time of discharge, patient was hemodynamically stable to be discharged to __ with ___.      ***LAST UPDATED 10/4**     78M w/ Crohn's, AFib/Flutter s/p DCCVs, remote ileocectomy and open appy, presented to [facility] with abdominal pain, outpatient CT concerning for dilated loops of small bowel and stomach, admitted for resolving SBO vs Crohn's flare, placed a nasogastric tube for decompression, plan for nonoperative management with serial abdominal exams and close hemodynamic monitoring. Course significant for uncontrolled hypertension, medicine team followed throughout their hospital stay, antihypertensives given accordingly. Given no progression of bowel function, decision was made to take patient to the OR on 6/26 for laparoscopic lysis of adhesions, converted to open lysis of adhesions, SBR x 3 and left in discontinuity, temporary abdominal closure. Transferred to SICU post op for hemodynamic monitoring, remained intubated. Course complicated by right sided pneumothorax, thoracic surgery was consulted, placed chest tube on 6/27. Patient returned to the OR on 6/28 for ex lap, removal of abthera, ileocolic resection, small bowel anastomosis, abdominal wall closure. Post operative hemoglobin downtrended to 8.5 from 9, given 1U PRBC. Started on TPN for nutritional optimization and successfully extubated to high flow NC on 6/29. Respiratory status continued to be monitored closely with serial CXRs, chest PT. Course complicated by fevers, altered mental status, fever workup obtained, CXR showing left lung consolidation/effusion, urinalysis negative, blood cultures negative. OR cultures growing enterococcus, infectious disease team consulted, antibiotics adjusted. PICC line was placed on 6/30, no complications. Patient was diuresed as necessary with lasix for fluid overload. Nasogastric tube was removed on 7/1. Wound vac changed 3 times/wk. Uptrending leukocytosis, CT chest AP was obtained on 7/2 showing small bowel malrotation, early/partial bowel obstruction vs post op ileus, small bilateral pleural effusions w/ bibasilar atelectasis. Transfused 1U PRBC for hemoglobin 7.7, responded appropriately. Diet was advanced as tolerated and per return of bowel function. Chest tube was removed on 7/3, repeat CXR with significant improvement. Patient was taken by interventional radiology team for perihepatic aspiration of serous fluid on 7/3. Patient returned to the OR on 7/5 for wound dehiscence for exlap, washout, ileocolic resection with end ileostomy, blow hole colostomy, fistula, red rubber from ileostomy to small bowel anastomosis; vicryl bridging mesh; R JOYCE below ileostomy, L JOYCE at small bowel enterotomy repair. SICU for post op monitoring.  Abdominal fluid growing C. tertium/Lactobacillus, antibiotics adjusted per ID team. Course complicated by post op fever, OR cultures growing rare yeast, started fluconazole. Successfully extubated on 7/6. Worked with ostomy nurses. Uncontrolled HTN, medications adjusted accordingly, transitioned to cardene gtt. Bedside US showing small bilateral pleural effusions, continued to be diuresed as necessary. TPN restarted, NGT and red rubber removed on 7/10, diet advanced as tolerated, cleared by speech and swallow evaluation for cough. Continued wound vac changes. Slight uptrending WBC post op, surveillance blood cultures sent, urine lytes sent, consistent with intrinsic renal disease. Echo obtained showing mild LVH w/ EF 65-70%. Liver function tests uptrending, ultrasound of abdomen completed showing no evidence of portal or hepatic venous thrombosis, fluid/sludge distended GB, no cholelithiasis or cholecystitis. Fever workup completed on 7/22 for Tmax 101. CT head/chest/abdomen/pelvis was obtained on 7/23 showing persistent ECF, Bilateral pleural effusions L>R. No acute intracranial hemorrhage. Blood cxs/MRSA/staph negative. Fistula had increasing output, wound care followed patient, imodium started for high output. Course complicated by acute delirium, all potential causes discontinued, stepped up to ICU care for further management, PICC line removed on 8/23, started empiric IV antibiotics for potential line sepsis. CT head obtained on 8/23 - normal scan, no hemorrhage or infarcts. Infectious workup obtained, blood cultures negative. TSH elevated, given IV synthroid. ICU course significant for hypoglycemia, hyponatremia, fevers - workup continued to be negative; elevated liver function tests. RUQ US obtained 8/26 showing distended gallbladder with sludge, no wall thickening/pericholecystic fluid, notable for right pleural effusion. CTAP obtained showing increased small R pleural effusion w/ atelectasis, no pneumoperitoneum. Diet advanced as tolerated, PICC and blood cultures remained negative. Patient clinically improved, was stepped down to telemetry on 8/28. Imodium increased per high ostomy output. PICC line placed on 9/1, TPN started for nutritional optimization. Patient was stepped up to ICU for severe ELVI, treated accordingly, mccauley placed. Restarted octreotide and lomotil for persistent high ostomy output. Mccauley removed on 9/3. Stepped down to telemetry when medically stable. Diet advanced as tolerated. Nephrology team consulted, urine lytes confirms SIADH. Course complicated by altered mental status, neurology team consulted, workup obtained. CT chest AP on 9/10 showing mild pulmonary edema, atelectasis vs pneumonia, no bowel obstruction, distended gallbladder w/ sludge. EEG obtained on 9/11: continuous mild generalized background slowing suggestive of a similar degree of diffuse or multifocal dysfunction; No epileptiform activity and no significant clinical events occurred. RUQ US consistent with distended gallbladder and small sludge, no cholelithiasis or acute cholecystitis. Taken for percutaneous cholecystectomy on 9/11. Continue IVF resuscitation for high ostomy output. Switched wound vac to wet to dry dressing changes, patient tolerating well. 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 AXR showing delayed gastric emptying given contrast remains in stomach, started erythromycin for gastroparesis. Wound care team consulted, switched wet to dry dressings to wound manager, restarted imodium. Patient worked with holistic therapy. Erythromycin eventually decreased and tapered off.       At the time of discharge, patient was hemodynamically stable to be discharged to __ with ___.      ***LAST UPDATED 10/4**     78M w/ Crohn's, AFib/Flutter s/p DCCVs, remote ileocectomy and open appy, presented to [facility] with abdominal pain, outpatient CT concerning for dilated loops of small bowel and stomach, admitted for resolving SBO vs Crohn's flare, placed a nasogastric tube for decompression, plan for nonoperative management with serial abdominal exams and close hemodynamic monitoring. Course significant for uncontrolled hypertension, medicine team followed throughout their hospital stay, antihypertensives given accordingly. Given no progression of bowel function, decision was made to take patient to the OR on 6/26 for laparoscopic lysis of adhesions, converted to open lysis of adhesions, SBR x 3 and left in discontinuity, temporary abdominal closure. Transferred to SICU post op for hemodynamic monitoring, remained intubated. Course complicated by right sided pneumothorax, thoracic surgery was consulted, placed chest tube on 6/27. Patient returned to the OR on 6/28 for ex lap, removal of abthera, ileocolic resection, small bowel anastomosis, abdominal wall closure. Post operative hemoglobin downtrended to 8.5 from 9, given 1U PRBC. Started on TPN for nutritional optimization and successfully extubated to high flow NC on 6/29. Respiratory status continued to be monitored closely with serial CXRs, chest PT. Course complicated by fevers, altered mental status, fever workup obtained, CXR showing left lung consolidation/effusion, urinalysis negative, blood cultures negative. OR cultures growing enterococcus, infectious disease team consulted, antibiotics adjusted. PICC line was placed on 6/30, no complications. Patient was diuresed as necessary with lasix for fluid overload. Nasogastric tube was removed on 7/1. Wound vac changed 3 times/wk. Uptrending leukocytosis, CT chest AP was obtained on 7/2 showing small bowel malrotation, early/partial bowel obstruction vs post op ileus, small bilateral pleural effusions w/ bibasilar atelectasis. Transfused 1U PRBC for hemoglobin 7.7, responded appropriately. Diet was advanced as tolerated and per return of bowel function. Chest tube was removed on 7/3, repeat CXR with significant improvement. Patient was taken by interventional radiology team for perihepatic aspiration of serous fluid on 7/3. Patient returned to the OR on 7/5 for wound dehiscence for exlap, washout, ileocolic resection with end ileostomy, blow hole colostomy, fistula, red rubber from ileostomy to small bowel anastomosis; vicryl bridging mesh; R JOYCE below ileostomy, L JOYCE at small bowel enterotomy repair. SICU for post op monitoring.  Abdominal fluid growing C. tertium/Lactobacillus, antibiotics adjusted per ID team. Course complicated by post op fever, OR cultures growing rare yeast, started fluconazole. Successfully extubated on 7/6. Worked with ostomy nurses. Uncontrolled HTN, medications adjusted accordingly, transitioned to cardene gtt. Bedside US showing small bilateral pleural effusions, continued to be diuresed as necessary. TPN restarted, NGT and red rubber removed on 7/10, diet advanced as tolerated, cleared by speech and swallow evaluation for cough. Continued wound vac changes. Slight uptrending WBC post op, surveillance blood cultures sent, urine lytes sent, consistent with intrinsic renal disease. Echo obtained showing mild LVH w/ EF 65-70%. Liver function tests uptrending, ultrasound of abdomen completed showing no evidence of portal or hepatic venous thrombosis, fluid/sludge distended GB, no cholelithiasis or cholecystitis. Fever workup completed on 7/22 for Tmax 101. CT head/chest/abdomen/pelvis was obtained on 7/23 showing persistent ECF, Bilateral pleural effusions L>R. No acute intracranial hemorrhage. Blood cxs/MRSA/staph negative. Fistula had increasing output, wound care followed patient, imodium started for high output. Course complicated by acute delirium, all potential causes discontinued, stepped up to ICU care for further management, PICC line removed on 8/23, started empiric IV antibiotics for potential line sepsis. CT head obtained on 8/23 - normal scan, no hemorrhage or infarcts. Infectious workup obtained, blood cultures negative. TSH elevated, given IV synthroid. ICU course significant for hypoglycemia, hyponatremia, fevers - workup continued to be negative; elevated liver function tests. RUQ US obtained 8/26 showing distended gallbladder with sludge, no wall thickening/pericholecystic fluid, notable for right pleural effusion. CTAP obtained showing increased small R pleural effusion w/ atelectasis, no pneumoperitoneum. Diet advanced as tolerated, PICC and blood cultures remained negative. Patient clinically improved, was stepped down to telemetry on 8/28. Imodium increased per high ostomy output. PICC line placed on 9/1, TPN started for nutritional optimization. Patient was stepped up to ICU for severe ELVI, treated accordingly, mccauley placed. Restarted octreotide and lomotil for persistent high ostomy output. Mccauley removed on 9/3. Stepped down to telemetry when medically stable. Diet advanced as tolerated. Nephrology team consulted, urine lytes confirms SIADH. Course complicated by altered mental status, neurology team consulted, workup obtained. CT chest AP on 9/10 showing mild pulmonary edema, atelectasis vs pneumonia, no bowel obstruction, distended gallbladder w/ sludge. EEG obtained on 9/11: continuous mild generalized background slowing suggestive of a similar degree of diffuse or multifocal dysfunction; No epileptiform activity and no significant clinical events occurred. RUQ US consistent with distended gallbladder and small sludge, no cholelithiasis or acute cholecystitis. Taken for percutaneous cholecystectomy on 9/11. Continue IVF resuscitation for high ostomy output. Switched wound vac to wet to dry dressing changes, patient tolerating well. 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 AXR showing delayed gastric emptying given contrast remains in stomach, started erythromycin for gastroparesis. Wound care team consulted, switched wet to dry dressings to wound manager, restarted imodium. Patient worked with holistic therapy. Erythromycin eventually decreased and tapered off.       At the time of discharge, patient was hemodynamically stable to be discharged to __ with ___.      ***LAST UPDATED 10/4**     78M w/ Crohn's, AFib/Flutter s/p DCCVs, remote ileocectomy and open appy, presented to [facility] with abdominal pain, outpatient CT concerning for dilated loops of small bowel and stomach, admitted for resolving SBO vs Crohn's flare, placed a nasogastric tube for decompression, plan for nonoperative management with serial abdominal exams and close hemodynamic monitoring. Course significant for uncontrolled hypertension, medicine team followed throughout their hospital stay, antihypertensives given accordingly. Given no progression of bowel function, decision was made to take patient to the OR on 6/26 for laparoscopic lysis of adhesions, converted to open lysis of adhesions, SBR x 3 and left in discontinuity, temporary abdominal closure. Transferred to SICU post op for hemodynamic monitoring, remained intubated. Course complicated by right sided pneumothorax, thoracic surgery was consulted, placed chest tube on 6/27. Patient returned to the OR on 6/28 for ex lap, removal of abthera, ileocolic resection, small bowel anastomosis, abdominal wall closure. Post operative hemoglobin downtrended to 8.5 from 9, given 1U PRBC. Started on TPN for nutritional optimization and successfully extubated to high flow NC on 6/29. Respiratory status continued to be monitored closely with serial CXRs, chest PT. Course complicated by fevers, altered mental status, fever workup obtained, CXR showing left lung consolidation/effusion, urinalysis negative, blood cultures negative. OR cultures growing enterococcus, infectious disease team consulted, antibiotics adjusted. PICC line was placed on 6/30, no complications. Patient was diuresed as necessary with lasix for fluid overload. Nasogastric tube was removed on 7/1. Wound vac changed 3 times/wk. Uptrending leukocytosis, CT chest AP was obtained on 7/2 showing small bowel malrotation, early/partial bowel obstruction vs post op ileus, small bilateral pleural effusions w/ bibasilar atelectasis. Transfused 1U PRBC for hemoglobin 7.7, responded appropriately. Diet was advanced as tolerated and per return of bowel function. Chest tube was removed on 7/3, repeat CXR with significant improvement. Patient was taken by interventional radiology team for perihepatic aspiration of serous fluid on 7/3. Patient returned to the OR on 7/5 for wound dehiscence for exlap, washout, ileocolic resection with end ileostomy, blow hole colostomy, fistula, red rubber from ileostomy to small bowel anastomosis; vicryl bridging mesh; R JOYCE below ileostomy, L JOYCE at small bowel enterotomy repair. SICU for post op monitoring.  Abdominal fluid growing C. tertium/Lactobacillus, antibiotics adjusted per ID team. Course complicated by post op fever, OR cultures growing rare yeast, started fluconazole. Successfully extubated on 7/6. Worked with ostomy nurses. Uncontrolled HTN, medications adjusted accordingly, transitioned to cardene gtt. Bedside US showing small bilateral pleural effusions, continued to be diuresed as necessary. TPN restarted, NGT and red rubber removed on 7/10, diet advanced as tolerated, cleared by speech and swallow evaluation for cough. Continued wound vac changes. Slight uptrending WBC post op, surveillance blood cultures sent, urine lytes sent, consistent with intrinsic renal disease. Echo obtained showing mild LVH w/ EF 65-70%. Liver function tests uptrending, ultrasound of abdomen completed showing no evidence of portal or hepatic venous thrombosis, fluid/sludge distended GB, no cholelithiasis or cholecystitis. Fever workup completed on 7/22 for Tmax 101. CT head/chest/abdomen/pelvis was obtained on 7/23 showing persistent ECF, Bilateral pleural effusions L>R. No acute intracranial hemorrhage. Blood cxs/MRSA/staph negative. Fistula had increasing output, wound care followed patient, imodium started for high output. Course complicated by acute delirium, all potential causes discontinued, stepped up to ICU care for further management, PICC line removed on 8/23, started empiric IV antibiotics for potential line sepsis. CT head obtained on 8/23 - normal scan, no hemorrhage or infarcts. Infectious workup obtained, blood cultures negative. TSH elevated, given IV synthroid. ICU course significant for hypoglycemia, hyponatremia, fevers - workup continued to be negative; elevated liver function tests. RUQ US obtained 8/26 showing distended gallbladder with sludge, no wall thickening/pericholecystic fluid, notable for right pleural effusion. CTAP obtained showing increased small R pleural effusion w/ atelectasis, no pneumoperitoneum. Diet advanced as tolerated, PICC and blood cultures remained negative. Patient clinically improved, was stepped down to telemetry on 8/28. Imodium increased per high ostomy output. PICC line placed on 9/1, TPN started for nutritional optimization. Patient was stepped up to ICU for severe ELVI, treated accordingly, mccauley placed. Restarted octreotide and lomotil for persistent high ostomy output. Mccauley removed on 9/3. Stepped down to telemetry when medically stable. Diet advanced as tolerated. Nephrology team consulted, urine lytes confirms SIADH. Course complicated by altered mental status, neurology team consulted, workup obtained. CT chest AP on 9/10 showing mild pulmonary edema, atelectasis vs pneumonia, no bowel obstruction, distended gallbladder w/ sludge. EEG obtained on 9/11: continuous mild generalized background slowing suggestive of a similar degree of diffuse or multifocal dysfunction; No epileptiform activity and no significant clinical events occurred. RUQ US consistent with distended gallbladder and small sludge, no cholelithiasis or acute cholecystitis. Taken for percutaneous cholecystectomy on 9/11. Continue IVF resuscitation for high ostomy output. Switched wound vac to wet to dry dressing changes, patient tolerating well. 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 AXR showing delayed gastric emptying given contrast remains in stomach, started erythromycin for gastroparesis. Wound care team consulted, switched wet to dry dressings to wound manager, restarted imodium. Patient worked with holistic therapy. Erythromycin eventually decreased and tapered off. Lomotil/imodium restarted, perc cony study performed, perc cony capped on 10/4. Diet advanced, fistula output increasing, uptrending BUN/Cr, IVF resuscitation as necessary. Started on cholestyramine on 10/18. Monitored BUN/Cr closely, FeNa 0.7% prerenal, continued IVF resuscitation. Dr. Yuan (GI) consulted for Crohn's management. Course significant for uptrending direct bilirubin with downtrending LFTs. T-tube uncapped on 10/25, tube study showing patent cystic/common bile ducts, minimal emptying into duodenum. LFTs/bilirubin continued to be trended. Creatinine uptrending, urine lytes showing intrinsic pathology. 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 Blood/fluid cultures sent. Started on high protein diet. Repeat CTAP on 11/1 showing no changes. Right upper extremity duplex obtained on 11/1 and consistent with brachial DVT. PICC line was replaced. Lovenox started for treatment of DVT. Hepatology followed patient during hospital stay, started ursodiol. Patient was transfused 1U PRBC on 11/6 for acute anemia, low anti Xa level, post transfusion CBC stable, started epo weekly. Left JOYCE drain removed on 11/7. Repeat upper extremity duplex on 11/10 negative for DVT. Course significant for new blood tinged output in eakins pouch, occult test positive for blood. Fungal cultures negative. Bedside US obtained for new shortness of breath on 11/13, consistent with small left pleural effusion, no right sided pleural effusions, echo normal. CT PE protocol obtained for persistent shortness of breath, showed no central PE; areas of groundglass attenuation involving the bilateral lung fields most pronounced at the ALDEN concerning for atypical pneumonia, edema or hemorrhage; moderate right and small left pleural effusion with atelectatic changes - diuresed as necessary. Diet advanced to low fat.       At the time of discharge, patient was hemodynamically stable to be discharged to __ with ___.      ***LAST UPDATED 11/14**                   78M w/ Crohn's, AFib/Flutter s/p DCCVs, remote ileocectomy and open appy, presented to [facility] with abdominal pain, outpatient CT concerning for dilated loops of small bowel and stomach, admitted for resolving SBO vs Crohn's flare, placed a nasogastric tube for decompression, plan for nonoperative management with serial abdominal exams and close hemodynamic monitoring. Course significant for uncontrolled hypertension, medicine team followed throughout their hospital stay, antihypertensives given accordingly. Given no progression of bowel function, decision was made to take patient to the OR on 6/26 for laparoscopic lysis of adhesions, converted to open lysis of adhesions, SBR x 3 and left in discontinuity, temporary abdominal closure. Transferred to SICU post op for hemodynamic monitoring, remained intubated. Course complicated by right sided pneumothorax, thoracic surgery was consulted, placed chest tube on 6/27. Patient returned to the OR on 6/28 for ex lap, removal of abthera, ileocolic resection, small bowel anastomosis, abdominal wall closure. Post operative hemoglobin downtrended to 8.5 from 9, given 1U PRBC. Started on TPN for nutritional optimization and successfully extubated to high flow NC on 6/29. Respiratory status continued to be monitored closely with serial CXRs, chest PT. Course complicated by fevers, altered mental status, fever workup obtained, CXR showing left lung consolidation/effusion, urinalysis negative, blood cultures negative. OR cultures growing enterococcus, infectious disease team consulted, antibiotics adjusted. PICC line was placed on 6/30, no complications. Patient was diuresed as necessary with lasix for fluid overload. Nasogastric tube was removed on 7/1. Wound vac changed 3 times/wk. Uptrending leukocytosis, CT chest AP was obtained on 7/2 showing small bowel malrotation, early/partial bowel obstruction vs post op ileus, small bilateral pleural effusions w/ bibasilar atelectasis. Transfused 1U PRBC for hemoglobin 7.7, responded appropriately. Diet was advanced as tolerated and per return of bowel function. Chest tube was removed on 7/3, repeat CXR with significant improvement. Patient was taken by interventional radiology team for perihepatic aspiration of serous fluid on 7/3. Patient returned to the OR on 7/5 for wound dehiscence for exlap, washout, ileocolic resection with end ileostomy, blow hole colostomy, fistula, red rubber from ileostomy to small bowel anastomosis; vicryl bridging mesh; R JOYCE below ileostomy, L JOYCE at small bowel enterotomy repair. SICU for post op monitoring.  Abdominal fluid growing C. tertium/Lactobacillus, antibiotics adjusted per ID team. Course complicated by post op fever, OR cultures growing rare yeast, started fluconazole. Successfully extubated on 7/6. Worked with ostomy nurses. Uncontrolled HTN, medications adjusted accordingly, transitioned to cardene gtt. Bedside US showing small bilateral pleural effusions, continued to be diuresed as necessary. TPN restarted, NGT and red rubber removed on 7/10, diet advanced as tolerated, cleared by speech and swallow evaluation for cough. Continued wound vac changes. Slight uptrending WBC post op, surveillance blood cultures sent, urine lytes sent, consistent with intrinsic renal disease. Echo obtained showing mild LVH w/ EF 65-70%. Liver function tests uptrending, ultrasound of abdomen completed showing no evidence of portal or hepatic venous thrombosis, fluid/sludge distended GB, no cholelithiasis or cholecystitis. Fever workup completed on 7/22 for Tmax 101. CT head/chest/abdomen/pelvis was obtained on 7/23 showing persistent ECF, Bilateral pleural effusions L>R. No acute intracranial hemorrhage. Blood cxs/MRSA/staph negative. Fistula had increasing output, wound care followed patient, imodium started for high output. Course complicated by acute delirium, all potential causes discontinued, stepped up to ICU care for further management, PICC line removed on 8/23, started empiric IV antibiotics for potential line sepsis. CT head obtained on 8/23 - normal scan, no hemorrhage or infarcts. Infectious workup obtained, blood cultures negative. TSH elevated, given IV synthroid. ICU course significant for hypoglycemia, hyponatremia, fevers - workup continued to be negative; elevated liver function tests. RUQ US obtained 8/26 showing distended gallbladder with sludge, no wall thickening/pericholecystic fluid, notable for right pleural effusion. CTAP obtained showing increased small R pleural effusion w/ atelectasis, no pneumoperitoneum. Diet advanced as tolerated, PICC and blood cultures remained negative. Patient clinically improved, was stepped down to telemetry on 8/28. Imodium increased per high ostomy output. PICC line placed on 9/1, TPN started for nutritional optimization. Patient was stepped up to ICU for severe ELVI, treated accordingly, mccauley placed. Restarted octreotide and lomotil for persistent high ostomy output. Mccauley removed on 9/3. Stepped down to telemetry when medically stable. Diet advanced as tolerated. Nephrology team consulted, urine lytes confirms SIADH. Course complicated by altered mental status, neurology team consulted, workup obtained. CT chest AP on 9/10 showing mild pulmonary edema, atelectasis vs pneumonia, no bowel obstruction, distended gallbladder w/ sludge. EEG obtained on 9/11: continuous mild generalized background slowing suggestive of a similar degree of diffuse or multifocal dysfunction; No epileptiform activity and no significant clinical events occurred. RUQ US consistent with distended gallbladder and small sludge, no cholelithiasis or acute cholecystitis. Taken for percutaneous cholecystectomy on 9/11. Continue IVF resuscitation for high ostomy output. Switched wound vac to wet to dry dressing changes, patient tolerating well. 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 AXR showing delayed gastric emptying given contrast remains in stomach, started erythromycin for gastroparesis. Wound care team consulted, switched wet to dry dressings to wound manager, restarted imodium. Patient worked with holistic therapy. Erythromycin eventually decreased and tapered off. Lomotil/imodium restarted, perc cony study performed, perc cony capped on 10/4. Diet advanced, fistula output increasing, uptrending BUN/Cr, IVF resuscitation as necessary. Started on cholestyramine on 10/18. Monitored BUN/Cr closely, FeNa 0.7% prerenal, continued IVF resuscitation. Dr. Yuan (GI) consulted for Crohn's management. Course significant for uptrending direct bilirubin with downtrending LFTs. T-tube uncapped on 10/25, tube study showing patent cystic/common bile ducts, minimal emptying into duodenum. LFTs/bilirubin continued to be trended. Creatinine uptrending, urine lytes showing intrinsic pathology. 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 Blood/fluid cultures sent. Started on high protein diet. Repeat CTAP on 11/1 showing no changes. Right upper extremity duplex obtained on 11/1 and consistent with brachial DVT. PICC line was replaced. Lovenox started for treatment of DVT. Hepatology followed patient during hospital stay, started ursodiol. Patient was transfused 1U PRBC on 11/6 for acute anemia, low anti Xa level, post transfusion CBC stable, started epo weekly. Left JOYCE drain removed on 11/7. Repeat upper extremity duplex on 11/10 negative for DVT. Course significant for new blood tinged output in eakins pouch, occult test positive for blood. Fungal cultures negative. Bedside US obtained for new shortness of breath on 11/13, consistent with small left pleural effusion, no right sided pleural effusions, echo normal. CT PE protocol obtained for persistent shortness of breath, showed no central PE; areas of groundglass attenuation involving the bilateral lung fields most pronounced at the ALDEN concerning for atypical pneumonia, edema or hemorrhage; moderate right and small left pleural effusion with atelectatic changes - diuresed as necessary. Diet advanced to low fat.       At the time of discharge, patient was hemodynamically stable to be discharged to __ with ___.      ***LAST UPDATED 11/14**                   78M w/ Crohn's, AFib/Flutter s/p DCCVs, remote ileocectomy and open appy, presented to [facility] with abdominal pain, outpatient CT concerning for dilated loops of small bowel and stomach, admitted for resolving SBO vs Crohn's flare, placed a nasogastric tube for decompression, plan for nonoperative management with serial abdominal exams and close hemodynamic monitoring. Course significant for uncontrolled hypertension, medicine team followed throughout their hospital stay, antihypertensives given accordingly. Given no progression of bowel function, decision was made to take patient to the OR on 6/26 for laparoscopic lysis of adhesions, converted to open lysis of adhesions, SBR x 3 and left in discontinuity, temporary abdominal closure. Transferred to SICU post op for hemodynamic monitoring, remained intubated. Course complicated by right sided pneumothorax, thoracic surgery was consulted, placed chest tube on 6/27. Patient returned to the OR on 6/28 for ex lap, removal of abthera, ileocolic resection, small bowel anastomosis, abdominal wall closure. Post operative hemoglobin downtrended to 8.5 from 9, given 1U PRBC. Started on TPN for nutritional optimization and successfully extubated to high flow NC on 6/29. Respiratory status continued to be monitored closely with serial CXRs, chest PT. Course complicated by fevers, altered mental status, fever workup obtained, CXR showing left lung consolidation/effusion, urinalysis negative, blood cultures negative. OR cultures growing enterococcus, infectious disease team consulted, antibiotics adjusted. PICC line was placed on 6/30, no complications. Patient was diuresed as necessary with lasix for fluid overload. Nasogastric tube was removed on 7/1. Wound vac changed 3 times/wk. Uptrending leukocytosis, CT chest AP was obtained on 7/2 showing small bowel malrotation, early/partial bowel obstruction vs post op ileus, small bilateral pleural effusions w/ bibasilar atelectasis. Transfused 1U PRBC for hemoglobin 7.7, responded appropriately. Diet was advanced as tolerated and per return of bowel function. Chest tube was removed on 7/3, repeat CXR with significant improvement. Patient was taken by interventional radiology team for perihepatic aspiration of serous fluid on 7/3. Patient returned to the OR on 7/5 for wound dehiscence for exlap, washout, ileocolic resection with end ileostomy, blow hole colostomy, fistula, red rubber from ileostomy to small bowel anastomosis; vicryl bridging mesh; R JOYCE below ileostomy, L JOYCE at small bowel enterotomy repair. SICU for post op monitoring.  Abdominal fluid growing C. tertium/Lactobacillus, antibiotics adjusted per ID team. Course complicated by post op fever, OR cultures growing rare yeast, started fluconazole. Successfully extubated on 7/6. Worked with ostomy nurses. Uncontrolled HTN, medications adjusted accordingly, transitioned to cardene gtt. Bedside US showing small bilateral pleural effusions, continued to be diuresed as necessary. TPN restarted, NGT and red rubber removed on 7/10, diet advanced as tolerated, cleared by speech and swallow evaluation for cough. Continued wound vac changes. Slight uptrending WBC post op, surveillance blood cultures sent, urine lytes sent, consistent with intrinsic renal disease. Echo obtained showing mild LVH w/ EF 65-70%. Liver function tests uptrending, ultrasound of abdomen completed showing no evidence of portal or hepatic venous thrombosis, fluid/sludge distended GB, no cholelithiasis or cholecystitis. Fever workup completed on 7/22 for Tmax 101. CT head/chest/abdomen/pelvis was obtained on 7/23 showing persistent ECF, Bilateral pleural effusions L>R. No acute intracranial hemorrhage. Blood cxs/MRSA/staph negative. Fistula had increasing output, wound care followed patient, imodium started for high output. Course complicated by acute delirium, all potential causes discontinued, stepped up to ICU care for further management, PICC line removed on 8/23, started empiric IV antibiotics for potential line sepsis. CT head obtained on 8/23 - normal scan, no hemorrhage or infarcts. Infectious workup obtained, blood cultures negative. TSH elevated, given IV synthroid. ICU course significant for hypoglycemia, hyponatremia, fevers - workup continued to be negative; elevated liver function tests. RUQ US obtained 8/26 showing distended gallbladder with sludge, no wall thickening/pericholecystic fluid, notable for right pleural effusion. CTAP obtained showing increased small R pleural effusion w/ atelectasis, no pneumoperitoneum. Diet advanced as tolerated, PICC and blood cultures remained negative. Patient clinically improved, was stepped down to telemetry on 8/28. Imodium increased per high ostomy output. PICC line placed on 9/1, TPN started for nutritional optimization. Patient was stepped up to ICU for severe ELVI, treated accordingly, mccauley placed. Restarted octreotide and lomotil for persistent high ostomy output. Mccauley removed on 9/3. Stepped down to telemetry when medically stable. Diet advanced as tolerated. Nephrology team consulted, urine lytes confirms SIADH. Course complicated by altered mental status, neurology team consulted, workup obtained. CT chest AP on 9/10 showing mild pulmonary edema, atelectasis vs pneumonia, no bowel obstruction, distended gallbladder w/ sludge. EEG obtained on 9/11: continuous mild generalized background slowing suggestive of a similar degree of diffuse or multifocal dysfunction; No epileptiform activity and no significant clinical events occurred. RUQ US consistent with distended gallbladder and small sludge, no cholelithiasis or acute cholecystitis. Taken for percutaneous cholecystectomy on 9/11. Continue IVF resuscitation for high ostomy output. Switched wound vac to wet to dry dressing changes, patient tolerating well. 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 AXR showing delayed gastric emptying given contrast remains in stomach, started erythromycin for gastroparesis. Wound care team consulted, switched wet to dry dressings to wound manager, restarted imodium. Patient worked with holistic therapy. Erythromycin eventually decreased and tapered off. Lomotil/imodium restarted, perc cony study performed, perc cony capped on 10/4. Diet advanced, fistula output increasing, uptrending BUN/Cr, IVF resuscitation as necessary. Started on cholestyramine on 10/18. Monitored BUN/Cr closely, FeNa 0.7% prerenal, continued IVF resuscitation. Dr. Yuan (GI) consulted for Crohn's management. Course significant for uptrending direct bilirubin with downtrending LFTs. T-tube uncapped on 10/25, tube study showing patent cystic/common bile ducts, minimal emptying into duodenum. LFTs/bilirubin continued to be trended. Creatinine uptrending, urine lytes showing intrinsic pathology. 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 Blood/fluid cultures sent. Started on high protein diet. Repeat CTAP on 11/1 showing no changes. Right upper extremity duplex obtained on 11/1 and consistent with brachial DVT. PICC line was replaced. Lovenox started for treatment of DVT. Hepatology followed patient during hospital stay, started ursodiol. Patient was transfused 1U PRBC on 11/6 for acute anemia, low anti Xa level, post transfusion CBC stable, started epo weekly. Left JOYCE drain removed on 11/7. Repeat upper extremity duplex on 11/10 negative for DVT. Course significant for new blood tinged output in eakins pouch, occult test positive for blood. Fungal cultures negative. Bedside US obtained for new shortness of breath on 11/13, consistent with small left pleural effusion, no right sided pleural effusions, echo normal. CT PE protocol obtained for persistent shortness of breath, showed no central PE; areas of groundglass attenuation involving the bilateral lung fields most pronounced at the AL[place] concerning for atypical pneumonia, edema or hemorrhage; moderate right and small left pleural effusion with atelectatic changes - diuresed as necessary. Diet advanced to low fat.       At the time of discharge, patient was hemodynamically stable to be discharged to __ with ___.      ***LAST UPDATED 11/14**                   78M w/ Crohn's, AFib/Flutter s/p DCCVs, remote ileocectomy and open appy, presented to [facility] with abdominal pain, outpatient CT concerning for dilated loops of small bowel and stomach, admitted for resolving SBO vs Crohn's flare, placed a nasogastric tube for decompression, plan for nonoperative management with serial abdominal exams and close hemodynamic monitoring. Course significant for uncontrolled hypertension, medicine team followed throughout their hospital stay, antihypertensives given accordingly. Given no progression of bowel function, decision was made to take patient to the OR on 6/26 for laparoscopic lysis of adhesions, converted to open lysis of adhesions, SBR x 3 and left in discontinuity, temporary abdominal closure. Transferred to SICU post op for hemodynamic monitoring, remained intubated. Course complicated by right sided pneumothorax, thoracic surgery was consulted, placed chest tube on 6/27. Patient returned to the OR on 6/28 for ex lap, removal of abthera, ileocolic resection, small bowel anastomosis, abdominal wall closure. Post operative hemoglobin downtrended to 8.5 from 9, given 1U PRBC. Started on TPN for nutritional optimization and successfully extubated to high flow NC on 6/29. Respiratory status continued to be monitored closely with serial CXRs, chest PT. Course complicated by fevers, altered mental status, fever workup obtained, CXR showing left lung consolidation/effusion, urinalysis negative, blood cultures negative. OR cultures growing enterococcus, infectious disease team consulted, antibiotics adjusted. PICC line was placed on 6/30, no complications. Patient was diuresed as necessary with lasix for fluid overload. Nasogastric tube was removed on 7/1. Wound vac changed 3 times/wk. Uptrending leukocytosis, CT chest AP was obtained on 7/2 showing small bowel malrotation, early/partial bowel obstruction vs post op ileus, small bilateral pleural effusions w/ bibasilar atelectasis. Transfused 1U PRBC for hemoglobin 7.7, responded appropriately. Diet was advanced as tolerated and per return of bowel function. Chest tube was removed on 7/3, repeat CXR with significant improvement. Patient was taken by interventional radiology team for perihepatic aspiration of serous fluid on 7/3. Patient returned to the OR on 7/5 for wound dehiscence for exlap, washout, ileocolic resection with end ileostomy, blow hole colostomy, fistula, red rubber from ileostomy to small bowel anastomosis; vicryl bridging mesh; R JOYCE below ileostomy, L JOYCE at small bowel enterotomy repair. SICU for post op monitoring.  Abdominal fluid growing C. tertium/Lactobacillus, antibiotics adjusted per ID team. Course complicated by post op fever, OR cultures growing rare yeast, started fluconazole. Successfully extubated on 7/6. Worked with ostomy nurses. Uncontrolled HTN, medications adjusted accordingly, transitioned to cardene gtt. Bedside US showing small bilateral pleural effusions, continued to be diuresed as necessary. TPN restarted, NGT and red rubber removed on 7/10, diet advanced as tolerated, cleared by speech and swallow evaluation for cough. Continued wound vac changes. Slight uptrending WBC post op, surveillance blood cultures sent, urine lytes sent, consistent with intrinsic renal disease. Echo obtained showing mild LVH w/ EF 65-70%. Liver function tests uptrending, ultrasound of abdomen completed showing no evidence of portal or hepatic venous thrombosis, fluid/sludge distended GB, no cholelithiasis or cholecystitis. Fever workup completed on 7/22 for Tmax 101. CT head/chest/abdomen/pelvis was obtained on 7/23 showing persistent ECF, Bilateral pleural effusions L>R. No acute intracranial hemorrhage. Blood cxs/MRSA/staph negative. Fistula had increasing output, wound care followed patient, imodium started for high output. Course complicated by acute delirium, all potential causes discontinued, stepped up to ICU care for further management, PICC line removed on 8/23, started empiric IV antibiotics for potential line sepsis. CT head obtained on 8/23 - normal scan, no hemorrhage or infarcts. Infectious workup obtained, blood cultures negative. TSH elevated, given IV synthroid. ICU course significant for hypoglycemia, hyponatremia, fevers - workup continued to be negative; elevated liver function tests. RUQ US obtained 8/26 showing distended gallbladder with sludge, no wall thickening/pericholecystic fluid, notable for right pleural effusion. CTAP obtained showing increased small R pleural effusion w/ atelectasis, no pneumoperitoneum. Diet advanced as tolerated, PICC and blood cultures remained negative. Patient clinically improved, was stepped down to telemetry on 8/28. Imodium increased per high ostomy output. PICC line placed on 9/1, TPN started for nutritional optimization. Patient was stepped up to ICU for severe ELVI, treated accordingly, mccauley placed. Restarted octreotide and lomotil for persistent high ostomy output. Mccauley removed on 9/3. Stepped down to telemetry when medically stable. Diet advanced as tolerated. Nephrology team consulted, urine lytes confirms SIADH. Course complicated by altered mental status, neurology team consulted, workup obtained. CT chest AP on 9/10 showing mild pulmonary edema, atelectasis vs pneumonia, no bowel obstruction, distended gallbladder w/ sludge. EEG obtained on 9/11: continuous mild generalized background slowing suggestive of a similar degree of diffuse or multifocal dysfunction; No epileptiform activity and no significant clinical events occurred. RUQ US consistent with distended gallbladder and small sludge, no cholelithiasis or acute cholecystitis. Taken for percutaneous cholecystectomy on 9/11. Continue IVF resuscitation for high ostomy output. Switched wound vac to wet to dry dressing changes, patient tolerating well. 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 AXR showing delayed gastric emptying given contrast remains in stomach, started erythromycin for gastroparesis. Wound care team consulted, switched wet to dry dressings to wound manager, restarted imodium. Patient worked with holistic therapy. Erythromycin eventually decreased and tapered off. Lomotil/imodium restarted, perc cony study performed, perc cony capped on 10/4. Diet advanced, fistula output increasing, uptrending BUN/Cr, IVF resuscitation as necessary. Started on cholestyramine on 10/18. Monitored BUN/Cr closely, FeNa 0.7% prerenal, continued IVF resuscitation. Dr. Yuan (GI) consulted for Crohn's management. Course significant for uptrending direct bilirubin with downtrending LFTs. T-tube uncapped on 10/25, tube study showing patent cystic/common bile ducts, minimal emptying into duodenum. LFTs/bilirubin continued to be trended. Creatinine uptrending, urine lytes showing intrinsic pathology. 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 Blood/fluid cultures sent. Started on high protein diet. Repeat CTAP on 11/1 showing no changes. Right upper extremity duplex obtained on 11/1 and consistent with brachial DVT. PICC line was replaced. Lovenox started for treatment of DVT. Hepatology followed patient during hospital stay, started ursodiol. Patient was transfused 1U PRBC on 11/6 for acute anemia, low anti Xa level, post transfusion CBC stable, started epo weekly. Left JOYCE drain removed on 11/7. Repeat upper extremity duplex on 11/10 negative for DVT. Course significant for new blood tinged output in eakins pouch, occult test positive for blood. Fungal cultures negative. Bedside US obtained for new shortness of breath on 11/13, consistent with small left pleural effusion, no right sided pleural effusions, echo normal. CT PE protocol obtained for persistent shortness of breath, showed no central PE; areas of groundglass attenuation involving the bilateral lung fields most pronounced at the AL[place] concerning for atypical pneumonia, edema or hemorrhage; moderate right and small left pleural effusion with atelectatic changes - diuresed as necessary. Diet advanced to low fat. RVP/MRSA swab negative, staph PCR positive. Repeat US on 11/18 showed small effusion, non worsening. New blood tinged output via ostomy, transfused 1U PRBC for acute anemia, achieved hemostasis with silver nitrate/surgicel, repeat CBC stable. Fever workup obtained on 11/22, antibiotics adjusted, urine lytes consistent with prerenal disease. Course complicated by atrial fibrillation with RVR (HR 140s), lopressor for rate control, improved. Discontinued TPN on 11/23. Bile culture positive for gram positive cocci. PICC line removed 11/24, blood cultures growing candida glabrata, infectious disease team was consulted, antibiotics broadened. TTE obtained on 11/24: mild concentric left ventricular hypertrophy, EF 65-70%, grade II left ventricular diastolic dysfunction with elevated filling pressure. Lactate uptrending, surveillance cultures sent. RUQ US: CBD only 5mm, contracted gallbladder, hepatic vessels patent; Bilateral upper extremity duplex: no evidence of DVT. Bile culture growing pseudomonas. Nephrology continued to follow patient, renal US on 12/1 negative for hydronephrosis, abdominal US negative for lesion and patent flow throughout. Bicarb gtt started on 12/1, labs trended. Bicarb gtt discontinued on 12/4 when improved. General surgery consulted for hyperbilirubinemia. +p-anca positive. PICC replaced on 12/4, TPN restarted. Repeat bedside US on 12/6 showing persistent bilateral pleural effusions, diuresed      At the time of discharge, patient was hemodynamically stable to be discharged to __ with ___.      ***LAST UPDATED 12/6** 78M w/ Crohn's, AFib/Flutter s/p DCCVs, remote ileocectomy and open appy, presented to [facility] with abdominal pain, outpatient CT concerning for dilated loops of small bowel and stomach, admitted for resolving SBO vs Crohn's flare, placed a nasogastric tube for decompression, plan for nonoperative management with serial abdominal exams and close hemodynamic monitoring. Course significant for uncontrolled hypertension, medicine team followed throughout their hospital stay, antihypertensives given accordingly. Given no progression of bowel function, decision was made to take patient to the OR on 6/26 for laparoscopic lysis of adhesions, converted to open lysis of adhesions, SBR x 3 and left in discontinuity, temporary abdominal closure. Transferred to SICU post op for hemodynamic monitoring, remained intubated. Course complicated by right sided pneumothorax, thoracic surgery was consulted, placed chest tube on 6/27. Patient returned to the OR on 6/28 for ex lap, removal of abthera, ileocolic resection, small bowel anastomosis, abdominal wall closure. Post operative hemoglobin downtrended to 8.5 from 9, given 1U PRBC. Started on TPN for nutritional optimization and successfully extubated to high flow NC on 6/29. Respiratory status continued to be monitored closely with serial CXRs, chest PT. Course complicated by fevers, altered mental status, fever workup obtained, CXR showing left lung consolidation/effusion, urinalysis negative, blood cultures negative. OR cultures growing enterococcus, infectious disease team consulted, antibiotics adjusted. PICC line was placed on 6/30, no complications. Patient was diuresed as necessary with lasix for fluid overload. Nasogastric tube was removed on 7/1. Wound vac changed 3 times/wk. Uptrending leukocytosis, CT chest AP was obtained on 7/2 showing small bowel malrotation, early/partial bowel obstruction vs post op ileus, small bilateral pleural effusions w/ bibasilar atelectasis. Transfused 1U PRBC for hemoglobin 7.7, responded appropriately. Diet was advanced as tolerated and per return of bowel function. Chest tube was removed on 7/3, repeat CXR with significant improvement. Patient was taken by interventional radiology team for perihepatic aspiration of serous fluid on 7/3. Patient returned to the OR on 7/5 for wound dehiscence for exlap, washout, ileocolic resection with end ileostomy, blow hole colostomy, fistula, red rubber from ileostomy to small bowel anastomosis; vicryl bridging mesh; R JOYCE below ileostomy, L JOYCE at small bowel enterotomy repair. SICU for post op monitoring.  Abdominal fluid growing C. tertium/Lactobacillus, antibiotics adjusted per ID team. Course complicated by post op fever, OR cultures growing rare yeast, started fluconazole. Successfully extubated on 7/6. Worked with ostomy nurses. Uncontrolled HTN, medications adjusted accordingly, transitioned to cardene gtt. Bedside US showing small bilateral pleural effusions, continued to be diuresed as necessary. TPN restarted, NGT and red rubber removed on 7/10, diet advanced as tolerated, cleared by speech and swallow evaluation for cough. Continued wound vac changes. Slight uptrending WBC post op, surveillance blood cultures sent, urine lytes sent, consistent with intrinsic renal disease. Echo obtained showing mild LVH w/ EF 65-70%. Liver function tests uptrending, ultrasound of abdomen completed showing no evidence of portal or hepatic venous thrombosis, fluid/sludge distended GB, no cholelithiasis or cholecystitis. Fever workup completed on 7/22 for Tmax 101. CT head/chest/abdomen/pelvis was obtained on 7/23 showing persistent ECF, Bilateral pleural effusions L>R. No acute intracranial hemorrhage. Blood cxs/MRSA/staph negative. Fistula had increasing output, wound care followed patient, imodium started for high output. Course complicated by acute delirium, all potential causes discontinued, stepped up to ICU care for further management, PICC line removed on 8/23, started empiric IV antibiotics for potential line sepsis. CT head obtained on 8/23 - normal scan, no hemorrhage or infarcts. Infectious workup obtained, blood cultures negative. TSH elevated, given IV synthroid. ICU course significant for hypoglycemia, hyponatremia, fevers - workup continued to be negative; elevated liver function tests. RUQ US obtained 8/26 showing distended gallbladder with sludge, no wall thickening/pericholecystic fluid, notable for right pleural effusion. CTAP obtained showing increased small R pleural effusion w/ atelectasis, no pneumoperitoneum. Diet advanced as tolerated, PICC and blood cultures remained negative. Patient clinically improved, was stepped down to telemetry on 8/28. Imodium increased per high ostomy output. PICC line placed on 9/1, TPN started for nutritional optimization. Patient was stepped up to ICU for severe ELVI, treated accordingly, mccauley placed. Restarted octreotide and lomotil for persistent high ostomy output. Mccauley removed on 9/3. Stepped down to telemetry when medically stable. Diet advanced as tolerated. Nephrology team consulted, urine lytes confirms SIADH. Course complicated by altered mental status, neurology team consulted, workup obtained. CT chest AP on 9/10 showing mild pulmonary edema, atelectasis vs pneumonia, no bowel obstruction, distended gallbladder w/ sludge. EEG obtained on 9/11: continuous mild generalized background slowing suggestive of a similar degree of diffuse or multifocal dysfunction; No epileptiform activity and no significant clinical events occurred. RUQ US consistent with distended gallbladder and small sludge, no cholelithiasis or acute cholecystitis. Taken for percutaneous cholecystectomy on 9/11. Continue IVF resuscitation for high ostomy output. Switched wound vac to wet to dry dressing changes, patient tolerating well. 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 AXR showing delayed gastric emptying given contrast remains in stomach, started erythromycin for gastroparesis. Wound care team consulted, switched wet to dry dressings to wound manager, restarted imodium. Patient worked with holistic therapy. Erythromycin eventually decreased and tapered off. Lomotil/imodium restarted, perc cony study performed, perc cony capped on 10/4. Diet advanced, fistula output increasing, uptrending BUN/Cr, IVF resuscitation as necessary. Started on cholestyramine on 10/18. Monitored BUN/Cr closely, FeNa 0.7% prerenal, continued IVF resuscitation. Dr. Yuan (GI) consulted for Crohn's management. Course significant for uptrending direct bilirubin with downtrending LFTs. T-tube uncapped on 10/25, tube study showing patent cystic/common bile ducts, minimal emptying into duodenum. LFTs/bilirubin continued to be trended. Creatinine uptrending, urine lytes showing intrinsic pathology. 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 Blood/fluid cultures sent. Started on high protein diet. Repeat CTAP on 11/1 showing no changes. Right upper extremity duplex obtained on 11/1 and consistent with brachial DVT. PICC line was replaced. Lovenox started for treatment of DVT. Hepatology followed patient during hospital stay, started ursodiol. Patient was transfused 1U PRBC on 11/6 for acute anemia, low anti Xa level, post transfusion CBC stable, started epo weekly. Left JOYCE drain removed on 11/7. Repeat upper extremity duplex on 11/10 negative for DVT. Course significant for new blood tinged output in eakins pouch, occult test positive for blood. Fungal cultures negative. Bedside US obtained for new shortness of breath on 11/13, consistent with small left pleural effusion, no right sided pleural effusions, echo normal. CT PE protocol obtained for persistent shortness of breath, showed no central PE; areas of groundglass attenuation involving the bilateral lung fields most pronounced at the [place] concerning for atypical pneumonia, edema or hemorrhage; moderate right and small left pleural effusion with atelectatic changes - diuresed as necessary. Diet advanced to low fat. RVP/MRSA swab negative, staph PCR positive. Repeat US on 11/18 showed small effusion, non worsening. New blood tinged output via ostomy, transfused 1U PRBC for acute anemia, achieved hemostasis with silver nitrate/surgicel, repeat CBC stable. Fever workup obtained on 11/22, antibiotics adjusted, urine lytes consistent with prerenal disease. Course complicated by atrial fibrillation with RVR (HR 140s), lopressor for rate control, improved. Discontinued TPN on 11/23. Bile culture positive for gram positive cocci. PICC line removed 11/24, blood cultures growing candida glabrata, infectious disease team was consulted, antibiotics broadened. TTE obtained on 11/24: mild concentric left ventricular hypertrophy, EF 65-70%, grade II left ventricular diastolic dysfunction with elevated filling pressure. Lactate uptrending, surveillance cultures sent. RUQ US: CBD only 5mm, contracted gallbladder, hepatic vessels patent; Bilateral upper extremity duplex: no evidence of DVT. Bile culture growing pseudomonas. Nephrology continued to follow patient, renal US on 12/1 negative for hydronephrosis, abdominal US negative for lesion and patent flow throughout. Bicarb gtt started on 12/1, labs trended. Bicarb gtt discontinued on 12/4 when improved. General surgery consulted for hyperbilirubinemia. +p-anca positive. PICC replaced on 12/4, TPN restarted. Repeat bedside US on 12/6 showing persistent bilateral pleural effusions, diuresed      At the time of discharge, patient was hemodynamically stable to be discharged to __ with ___.      ***LAST UPDATED 12/6** 78M w/ Crohn's, AFib/Flutter s/p DCCVs, remote ileocectomy and open appy, presented to [facility] with abdominal pain, outpatient CT concerning for dilated loops of small bowel and stomach, admitted for resolving SBO vs Crohn's flare, placed a nasogastric tube for decompression, plan for nonoperative management with serial abdominal exams and close hemodynamic monitoring. Course significant for uncontrolled hypertension, medicine team followed throughout their hospital stay, antihypertensives given accordingly. Given no progression of bowel function, decision was made to take patient to the OR on 6/26 for laparoscopic lysis of adhesions, converted to open lysis of adhesions, SBR x 3 and left in discontinuity, temporary abdominal closure. Transferred to SICU post op for hemodynamic monitoring, remained intubated. Course complicated by right sided pneumothorax, thoracic surgery was consulted, placed chest tube on 6/27. Patient returned to the OR on 6/28 for ex lap, removal of abthera, ileocolic resection, small bowel anastomosis, abdominal wall closure. Post operative hemoglobin downtrended to 8.5 from 9, given 1U PRBC. Started on TPN for nutritional optimization and successfully extubated to high flow NC on 6/29. Respiratory status continued to be monitored closely with serial CXRs, chest PT. Course complicated by fevers, altered mental status, fever workup obtained, CXR showing left lung consolidation/effusion, urinalysis negative, blood cultures negative. OR cultures growing enterococcus, infectious disease team consulted, antibiotics adjusted. PICC line was placed on 6/30, no complications. Patient was diuresed as necessary with lasix for fluid overload. Nasogastric tube was removed on 7/1. Wound vac changed 3 times/wk. Uptrending leukocytosis, CT chest AP was obtained on 7/2 showing small bowel malrotation, early/partial bowel obstruction vs post op ileus, small bilateral pleural effusions w/ bibasilar atelectasis. Transfused 1U PRBC for hemoglobin 7.7, responded appropriately. Diet was advanced as tolerated and per return of bowel function. Chest tube was removed on 7/3, repeat CXR with significant improvement. Patient was taken by interventional radiology team for perihepatic aspiration of serous fluid on 7/3. Patient returned to the OR on 7/5 for wound dehiscence for exlap, washout, ileocolic resection with end ileostomy, blow hole colostomy, fistula, red rubber from ileostomy to small bowel anastomosis; vicryl bridging mesh; R JOYCE below ileostomy, L JOYCE at small bowel enterotomy repair. SICU for post op monitoring.  Abdominal fluid growing C. tertium/Lactobacillus, antibiotics adjusted per ID team. Course complicated by post op fever, OR cultures growing rare yeast, started fluconazole. Successfully extubated on 7/6. Worked with ostomy nurses. Uncontrolled HTN, medications adjusted accordingly, transitioned to cardene gtt. Bedside US showing small bilateral pleural effusions, continued to be diuresed as necessary. TPN restarted, NGT and red rubber removed on 7/10, diet advanced as tolerated, cleared by speech and swallow evaluation for cough. Continued wound vac changes. Slight uptrending WBC post op, surveillance blood cultures sent, urine lytes sent, consistent with intrinsic renal disease. Echo obtained showing mild LVH w/ EF 65-70%. Liver function tests uptrending, ultrasound of abdomen completed showing no evidence of portal or hepatic venous thrombosis, fluid/sludge distended GB, no cholelithiasis or cholecystitis. Fever workup completed on 7/22 for Tmax 101. CT head/chest/abdomen/pelvis was obtained on 7/23 showing persistent ECF, Bilateral pleural effusions L>R. No acute intracranial hemorrhage. Blood cxs/MRSA/staph negative. Fistula had increasing output, wound care followed patient, imodium started for high output. Course complicated by acute delirium, all potential causes discontinued, stepped up to ICU care for further management, PICC line removed on 8/23, started empiric IV antibiotics for potential line sepsis. CT head obtained on 8/23 - normal scan, no hemorrhage or infarcts. Infectious workup obtained, blood cultures negative. TSH elevated, given IV synthroid. ICU course significant for hypoglycemia, hyponatremia, fevers - workup continued to be negative; elevated liver function tests. RUQ US obtained 8/26 showing distended gallbladder with sludge, no wall thickening/pericholecystic fluid, notable for right pleural effusion. CTAP obtained showing increased small R pleural effusion w/ atelectasis, no pneumoperitoneum. Diet advanced as tolerated, PICC and blood cultures remained negative. Patient clinically improved, was stepped down to telemetry on 8/28. Imodium increased per high ostomy output. PICC line placed on 9/1, TPN started for nutritional optimization. Patient was stepped up to ICU for severe ELVI, treated accordingly, mccauley placed. Restarted octreotide and lomotil for persistent high ostomy output. Mccauley removed on 9/3. Stepped down to telemetry when medically stable. Diet advanced as tolerated. Nephrology team consulted, urine lytes confirms SIADH. Course complicated by altered mental status, neurology team consulted, workup obtained. CT chest AP on 9/10 showing mild pulmonary edema, atelectasis vs pneumonia, no bowel obstruction, distended gallbladder w/ sludge. EEG obtained on 9/11: continuous mild generalized background slowing suggestive of a similar degree of diffuse or multifocal dysfunction; No epileptiform activity and no significant clinical events occurred. RUQ US consistent with distended gallbladder and small sludge, no cholelithiasis or acute cholecystitis. Taken for percutaneous cholecystectomy on 9/11. Continue IVF resuscitation for high ostomy output. Switched wound vac to wet to dry dressing changes, patient tolerating well. 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 AXR showing delayed gastric emptying given contrast remains in stomach, started erythromycin for gastroparesis. Wound care team consulted, switched wet to dry dressings to wound manager, restarted imodium. Patient worked with holistic therapy. Erythromycin eventually decreased and tapered off. Lomotil/imodium restarted, perc cony study performed, perc cony capped on 10/4. Diet advanced, fistula output increasing, uptrending BUN/Cr, IVF resuscitation as necessary. Started on cholestyramine on 10/18. Monitored BUN/Cr closely, FeNa 0.7% prerenal, continued IVF resuscitation. Dr. Yuan (GI) consulted for Crohn's management. Course significant for uptrending direct bilirubin with downtrending LFTs. T-tube uncapped on 10/25, tube study showing patent cystic/common bile ducts, minimal emptying into duodenum. LFTs/bilirubin continued to be trended. Creatinine uptrending, urine lytes showing intrinsic pathology. 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 Blood/fluid cultures sent. Started on high protein diet. Repeat CTAP on 11/1 showing no changes. Right upper extremity duplex obtained on 11/1 and consistent with brachial DVT. PICC line was replaced. Lovenox started for treatment of DVT. Hepatology followed patient during hospital stay, started ursodiol. Patient was transfused 1U PRBC on 11/6 for acute anemia, low anti Xa level, post transfusion CBC stable, started epo weekly. Left JOYCE drain removed on 11/7. Repeat upper extremity duplex on 11/10 negative for DVT. Course significant for new blood tinged output in eakins pouch, occult test positive for blood. Fungal cultures negative. Bedside US obtained for new shortness of breath on 11/13, consistent with small left pleural effusion, no right sided pleural effusions, echo normal. CT PE protocol obtained for persistent shortness of breath, showed no central PE; areas of groundglass attenuation involving the bilateral lung fields most pronounced at the [place] concerning for atypical pneumonia, edema or hemorrhage; moderate right and small left pleural effusion with atelectatic changes - diuresed as necessary. Diet advanced to low fat. RVP/MRSA swab negative, staph PCR positive. Repeat US on 11/18 showed small effusion, non worsening. New blood tinged output via ostomy, transfused 1U PRBC for acute anemia, achieved hemostasis with silver nitrate/surgicel, repeat CBC stable. Fever workup obtained on 11/22, antibiotics adjusted, urine lytes consistent with prerenal disease. Course complicated by atrial fibrillation with RVR (HR 140s), lopressor for rate control, improved. Discontinued TPN on 11/23. Bile culture positive for gram positive cocci. PICC line removed 11/24, blood cultures growing candida glabrata, infectious disease team was consulted, antibiotics broadened. TTE obtained on 11/24: mild concentric left ventricular hypertrophy, EF 65-70%, grade II left ventricular diastolic dysfunction with elevated filling pressure. Lactate uptrending, surveillance cultures sent. RUQ US: CBD only 5mm, contracted gallbladder, hepatic vessels patent; Bilateral upper extremity duplex: no evidence of DVT. Bile culture growing pseudomonas. Nephrology continued to follow patient, renal US on 12/1 negative for hydronephrosis, abdominal US negative for lesion and patent flow throughout. Bicarb gtt started on 12/1, labs trended. Bicarb gtt discontinued on 12/4 when improved. General surgery consulted for hyperbilirubinemia. +p-anca positive. PICC replaced on 12/4, TPN restarted. Repeat bedside US on 12/6 showing persistent bilateral pleural effusions, diuresed      At the time of discharge, patient was hemodynamically stable to be discharged to __ with ___.      ***LAST UPDATED 12/6** 78M w/ Crohn's, AFib/Flutter s/p DCCVs, remote ileocectomy and open appy, presented to [facility] with abdominal pain, outpatient CT concerning for dilated loops of small bowel and stomach, admitted for resolving SBO vs Crohn's flare, placed a nasogastric tube for decompression, plan for nonoperative management with serial abdominal exams and close hemodynamic monitoring. Course significant for uncontrolled hypertension, medicine team followed throughout their hospital stay, antihypertensives given accordingly. Given no progression of bowel function, decision was made to take patient to the OR on 6/26 for laparoscopic lysis of adhesions, converted to open lysis of adhesions, SBR x 3 and left in discontinuity, temporary abdominal closure. Transferred to SICU post op for hemodynamic monitoring, remained intubated. Course complicated by right sided pneumothorax, thoracic surgery was consulted, placed chest tube on 6/27. Patient returned to the OR on 6/28 for ex lap, removal of abthera, ileocolic resection, small bowel anastomosis, abdominal wall closure. Post operative hemoglobin downtrended to 8.5 from 9, given 1U PRBC. Started on TPN for nutritional optimization and successfully extubated to high flow NC on 6/29. Respiratory status continued to be monitored closely with serial CXRs, chest PT. Course complicated by fevers, altered mental status, fever workup obtained, CXR showing left lung consolidation/effusion, urinalysis negative, blood cultures negative. OR cultures growing enterococcus, infectious disease team consulted, antibiotics adjusted. PICC line was placed on 6/30, no complications. Patient was diuresed as necessary with lasix for fluid overload. Nasogastric tube was removed on 7/1. Wound vac changed 3 times/wk. Uptrending leukocytosis, CT chest AP was obtained on 7/2 showing small bowel malrotation, early/partial bowel obstruction vs post op ileus, small bilateral pleural effusions w/ bibasilar atelectasis. Transfused 1U PRBC for hemoglobin 7.7, responded appropriately. Diet was advanced as tolerated and per return of bowel function. Chest tube was removed on 7/3, repeat CXR with significant improvement. Patient was taken by interventional radiology team for perihepatic aspiration of serous fluid on 7/3. Patient returned to the OR on 7/5 for wound dehiscence for exlap, washout, ileocolic resection with end ileostomy, blow hole colostomy, fistula, red rubber from ileostomy to small bowel anastomosis; vicryl bridging mesh; R JOYCE below ileostomy, L JOYCE at small bowel enterotomy repair. SICU for post op monitoring.  Abdominal fluid growing C. tertium/Lactobacillus, antibiotics adjusted per ID team. Course complicated by post op fever, OR cultures growing rare yeast, started fluconazole. Successfully extubated on 7/6. Worked with ostomy nurses. Uncontrolled HTN, medications adjusted accordingly, transitioned to cardene gtt. Bedside US showing small bilateral pleural effusions, continued to be diuresed as necessary. TPN restarted, NGT and red rubber removed on 7/10, diet advanced as tolerated, cleared by speech and swallow evaluation for cough. Continued wound vac changes. Slight uptrending WBC post op, surveillance blood cultures sent, urine lytes sent, consistent with intrinsic renal disease. Echo obtained showing mild LVH w/ EF 65-70%. Liver function tests uptrending, ultrasound of abdomen completed showing no evidence of portal or hepatic venous thrombosis, fluid/sludge distended GB, no cholelithiasis or cholecystitis. Fever workup completed on 7/22 for Tmax 101. CT head/chest/abdomen/pelvis was obtained on 7/23 showing persistent ECF, Bilateral pleural effusions L>R. No acute intracranial hemorrhage. Blood cxs/MRSA/staph negative. Fistula had increasing output, wound care followed patient, imodium started for high output. Course complicated by acute delirium, all potential causes discontinued, stepped up to ICU care for further management, PICC line removed on 8/23, started empiric IV antibiotics for potential line sepsis. CT head obtained on 8/23 - normal scan, no hemorrhage or infarcts. Infectious workup obtained, blood cultures negative. TSH elevated, given IV synthroid. ICU course significant for hypoglycemia, hyponatremia, fevers - workup continued to be negative; elevated liver function tests. RUQ US obtained 8/26 showing distended gallbladder with sludge, no wall thickening/pericholecystic fluid, notable for right pleural effusion. CTAP obtained showing increased small R pleural effusion w/ atelectasis, no pneumoperitoneum. Diet advanced as tolerated, PICC and blood cultures remained negative. Patient clinically improved, was stepped down to telemetry on 8/28. Imodium increased per high ostomy output. PICC line placed on 9/1, TPN started for nutritional optimization. Patient was stepped up to ICU for severe ELVI, treated accordingly, mccauley placed. Restarted octreotide and lomotil for persistent high ostomy output. Mccauley removed on 9/3. Stepped down to telemetry when medically stable. Diet advanced as tolerated. Nephrology team consulted, urine lytes confirms SIADH. Course complicated by altered mental status, neurology team consulted, workup obtained. CT chest AP on 9/10 showing mild pulmonary edema, atelectasis vs pneumonia, no bowel obstruction, distended gallbladder w/ sludge. EEG obtained on 9/11: continuous mild generalized background slowing suggestive of a similar degree of diffuse or multifocal dysfunction; No epileptiform activity and no significant clinical events occurred. RUQ US consistent with distended gallbladder and small sludge, no cholelithiasis or acute cholecystitis. Taken for percutaneous cholecystectomy on 9/11. Continue IVF resuscitation for high ostomy output. Switched wound vac to wet to dry dressing changes, patient tolerating well. 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 AXR showing delayed gastric emptying given contrast remains in stomach, started erythromycin for gastroparesis. Wound care team consulted, switched wet to dry dressings to wound manager, restarted imodium. Patient worked with holistic therapy. Erythromycin eventually decreased and tapered off. Lomotil/imodium restarted, perc cony study performed, perc cony capped on 10/4. Diet advanced, fistula output increasing, uptrending BUN/Cr, IVF resuscitation as necessary. Started on cholestyramine on 10/18. Monitored BUN/Cr closely, FeNa 0.7% prerenal, continued IVF resuscitation. Dr. Yuan (GI) consulted for Crohn's management. Course significant for uptrending direct bilirubin with downtrending LFTs. T-tube uncapped on 10/25, tube study showing patent cystic/common bile ducts, minimal emptying into duodenum. LFTs/bilirubin continued to be trended. Creatinine uptrending, urine lytes showing intrinsic pathology. 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 Blood/fluid cultures sent. Started on high protein diet. Repeat CTAP on 11/1 showing no changes. Right upper extremity duplex obtained on 11/1 and consistent with brachial DVT. PICC line was replaced. Lovenox started for treatment of DVT. Hepatology followed patient during hospital stay, started ursodiol. Patient was transfused 1U PRBC on 11/6 for acute anemia, low anti Xa level, post transfusion CBC stable, started epo weekly. Left JOYCE drain removed on 11/7. Repeat upper extremity duplex on 11/10 negative for DVT. Course significant for new blood tinged output in eakins pouch, occult test positive for blood. Fungal cultures negative. Bedside US obtained for new shortness of breath on 11/13, consistent with small left pleural effusion, no right sided pleural effusions, echo normal. CT PE protocol obtained for persistent shortness of breath, showed no central PE; areas of groundglass attenuation involving the bilateral lung fields most pronounced at the [place] concerning for atypical pneumonia, edema or hemorrhage; moderate right and small left pleural effusion with atelectatic changes - diuresed as necessary. Diet advanced to low fat. RVP/MRSA swab negative, staph PCR positive. Repeat US on 11/18 showed small effusion, non worsening. New blood tinged output via ostomy, transfused 1U PRBC for acute anemia, achieved hemostasis with silver nitrate/surgicel, repeat CBC stable. Fever workup obtained on 11/22, antibiotics adjusted, urine lytes consistent with prerenal disease. Course complicated by atrial fibrillation with RVR (HR 140s), lopressor for rate control, improved. Discontinued TPN on 11/23. Bile culture positive for gram positive cocci. PICC line removed 11/24, blood cultures growing candida glabrata, infectious disease team was consulted, antibiotics broadened. TTE obtained on 11/24: mild concentric left ventricular hypertrophy, EF 65-70%, grade II left ventricular diastolic dysfunction with elevated filling pressure. Lactate uptrending, surveillance cultures sent. RUQ US: CBD only 5mm, contracted gallbladder, hepatic vessels patent; Bilateral upper extremity duplex: no evidence of DVT. Bile culture growing pseudomonas. Nephrology continued to follow patient, renal US on 12/1 negative for hydronephrosis, abdominal US negative for lesion and patent flow throughout. Bicarb gtt started on 12/1, labs trended. Bicarb gtt discontinued on 12/4 when improved. General surgery consulted for hyperbilirubinemia. +p-anca positive. PICC replaced on 12/4, TPN restarted. Repeat bedside US on 12/6 showing persistent bilateral pleural effusions, diuresed. Course significant for a fib w/ RVR, rate controlled accordingly. EBV positive on 12/8. Transfused 1U PRBC on 12/9 for hgb 7.5, repeat hgb 8.5. CMV negative. Continued to be IV fluid resuscitated as needed. Noted punctate skin bleed at wound on 12/18 requiring silver nitrate and surgicell. 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 Transfused 1U PRBC on 12/24 for hgb 7.3, repeat hgb improved. Holistic therapy nurse was consulted, followed throughout hospital stay. Transfused 1U PRBC for symptomatic anemia on 12/28, persistent punctuate wound bleeding, reinforced, held heparin. Attempted hemostasis of persistent bleed w/ suturing/cautery however unsuccessful. Hemostasis achieved w/ epi/saline gauze. Ileostomy reinforced, red rubber placed on 12/30. Perc cony tube capped on 1/2. TTE obtained on 1/4 showing: LVEF 75%, mild/moderate aortic regurg, PASP 40, RVEF wnl. Bedside ultrasound showing bilateral effusions. Bilateral lower extremity duplex obtained, no evidence of DVT. Right upper extremity PICC placed, left upper extremity removed on 1/5. Rate control medications continued to be adjusted accordingly, also started timolol eye drops for additional beta blocking effect. Started therapeutic lovenox on 1/10. Process started for approval of medication gattex on 1/10.    At the time of discharge, patient was hemodynamically stable to be discharged to __ with ___.          ***LAST UPDATED 1/12/2024**   78M w/ Crohn's, AFib/Flutter s/p DCCVs, remote ileocectomy and open appy, presented to [facility] with abdominal pain, outpatient CT concerning for dilated loops of small bowel and stomach, admitted for resolving SBO vs Crohn's flare, placed a nasogastric tube for decompression, plan for nonoperative management with serial abdominal exams and close hemodynamic monitoring. Course significant for uncontrolled hypertension, medicine team followed throughout their hospital stay, antihypertensives given accordingly. Given no progression of bowel function, decision was made to take patient to the OR on 6/26 for laparoscopic lysis of adhesions, converted to open lysis of adhesions, SBR x 3 and left in discontinuity, temporary abdominal closure. Transferred to SICU post op for hemodynamic monitoring, remained intubated. Course complicated by right sided pneumothorax, thoracic surgery was consulted, placed chest tube on 6/27. Patient returned to the OR on 6/28 for ex lap, removal of abthera, ileocolic resection, small bowel anastomosis, abdominal wall closure. Post operative hemoglobin downtrended to 8.5 from 9, given 1U PRBC. Started on TPN for nutritional optimization and successfully extubated to high flow NC on 6/29. Respiratory status continued to be monitored closely with serial CXRs, chest PT. Course complicated by fevers, altered mental status, fever workup obtained, CXR showing left lung consolidation/effusion, urinalysis negative, blood cultures negative. OR cultures growing enterococcus, infectious disease team consulted, antibiotics adjusted. PICC line was placed on 6/30, no complications. Patient was diuresed as necessary with lasix for fluid overload. Nasogastric tube was removed on 7/1. Wound vac changed 3 times/wk. Uptrending leukocytosis, CT chest AP was obtained on 7/2 showing small bowel malrotation, early/partial bowel obstruction vs post op ileus, small bilateral pleural effusions w/ bibasilar atelectasis. Transfused 1U PRBC for hemoglobin 7.7, responded appropriately. Diet was advanced as tolerated and per return of bowel function. Chest tube was removed on 7/3, repeat CXR with significant improvement. Patient was taken by interventional radiology team for perihepatic aspiration of serous fluid on 7/3. Patient returned to the OR on 7/5 for wound dehiscence for exlap, washout, ileocolic resection with end ileostomy, blow hole colostomy, fistula, red rubber from ileostomy to small bowel anastomosis; vicryl bridging mesh; R JOYCE below ileostomy, L JOYCE at small bowel enterotomy repair. SICU for post op monitoring.  Abdominal fluid growing C. tertium/Lactobacillus, antibiotics adjusted per ID team. Course complicated by post op fever, OR cultures growing rare yeast, started fluconazole. Successfully extubated on 7/6. Worked with ostomy nurses. Uncontrolled HTN, medications adjusted accordingly, transitioned to cardene gtt. Bedside US showing small bilateral pleural effusions, continued to be diuresed as necessary. TPN restarted, NGT and red rubber removed on 7/10, diet advanced as tolerated, cleared by speech and swallow evaluation for cough. Continued wound vac changes. Slight uptrending WBC post op, surveillance blood cultures sent, urine lytes sent, consistent with intrinsic renal disease. Echo obtained showing mild LVH w/ EF 65-70%. Liver function tests uptrending, ultrasound of abdomen completed showing no evidence of portal or hepatic venous thrombosis, fluid/sludge distended GB, no cholelithiasis or cholecystitis. Fever workup completed on 7/22 for Tmax 101. CT head/chest/abdomen/pelvis was obtained on 7/23 showing persistent ECF, Bilateral pleural effusions L>R. No acute intracranial hemorrhage. Blood cxs/MRSA/staph negative. Fistula had increasing output, wound care followed patient, imodium started for high output. Course complicated by acute delirium, all potential causes discontinued, stepped up to ICU care for further management, PICC line removed on 8/23, started empiric IV antibiotics for potential line sepsis. CT head obtained on 8/23 - normal scan, no hemorrhage or infarcts. Infectious workup obtained, blood cultures negative. TSH elevated, given IV synthroid. ICU course significant for hypoglycemia, hyponatremia, fevers - workup continued to be negative; elevated liver function tests. RUQ US obtained 8/26 showing distended gallbladder with sludge, no wall thickening/pericholecystic fluid, notable for right pleural effusion. CTAP obtained showing increased small R pleural effusion w/ atelectasis, no pneumoperitoneum. Diet advanced as tolerated, PICC and blood cultures remained negative. Patient clinically improved, was stepped down to telemetry on 8/28. Imodium increased per high ostomy output. PICC line placed on 9/1, TPN started for nutritional optimization. Patient was stepped up to ICU for severe ELVI, treated accordingly, mccauley placed. Restarted octreotide and lomotil for persistent high ostomy output. Mccauley removed on 9/3. Stepped down to telemetry when medically stable. Diet advanced as tolerated. Nephrology team consulted, urine lytes confirms SIADH. Course complicated by altered mental status, neurology team consulted, workup obtained. CT chest AP on 9/10 showing mild pulmonary edema, atelectasis vs pneumonia, no bowel obstruction, distended gallbladder w/ sludge. EEG obtained on 9/11: continuous mild generalized background slowing suggestive of a similar degree of diffuse or multifocal dysfunction; No epileptiform activity and no significant clinical events occurred. RUQ US consistent with distended gallbladder and small sludge, no cholelithiasis or acute cholecystitis. Taken for percutaneous cholecystectomy on 9/11. Continue IVF resuscitation for high ostomy output. Switched wound vac to wet to dry dressing changes, patient tolerating well. 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 AXR showing delayed gastric emptying given contrast remains in stomach, started erythromycin for gastroparesis. Wound care team consulted, switched wet to dry dressings to wound manager, restarted imodium. Patient worked with holistic therapy. Erythromycin eventually decreased and tapered off. Lomotil/imodium restarted, perc cony study performed, perc cony capped on 10/4. Diet advanced, fistula output increasing, uptrending BUN/Cr, IVF resuscitation as necessary. Started on cholestyramine on 10/18. Monitored BUN/Cr closely, FeNa 0.7% prerenal, continued IVF resuscitation. Dr. Yuan (GI) consulted for Crohn's management. Course significant for uptrending direct bilirubin with downtrending LFTs. T-tube uncapped on 10/25, tube study showing patent cystic/common bile ducts, minimal emptying into duodenum. LFTs/bilirubin continued to be trended. Creatinine uptrending, urine lytes showing intrinsic pathology. 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 Blood/fluid cultures sent. Started on high protein diet. Repeat CTAP on 11/1 showing no changes. Right upper extremity duplex obtained on 11/1 and consistent with brachial DVT. PICC line was replaced. Lovenox started for treatment of DVT. Hepatology followed patient during hospital stay, started ursodiol. Patient was transfused 1U PRBC on 11/6 for acute anemia, low anti Xa level, post transfusion CBC stable, started epo weekly. Left JOYCE drain removed on 11/7. Repeat upper extremity duplex on 11/10 negative for DVT. Course significant for new blood tinged output in eakins pouch, occult test positive for blood. Fungal cultures negative. Bedside US obtained for new shortness of breath on 11/13, consistent with small left pleural effusion, no right sided pleural effusions, echo normal. CT PE protocol obtained for persistent shortness of breath, showed no central PE; areas of groundglass attenuation involving the bilateral lung fields most pronounced at the [place] concerning for atypical pneumonia, edema or hemorrhage; moderate right and small left pleural effusion with atelectatic changes - diuresed as necessary. Diet advanced to low fat. RVP/MRSA swab negative, staph PCR positive. Repeat US on 11/18 showed small effusion, non worsening. New blood tinged output via ostomy, transfused 1U PRBC for acute anemia, achieved hemostasis with silver nitrate/surgicel, repeat CBC stable. Fever workup obtained on 11/22, antibiotics adjusted, urine lytes consistent with prerenal disease. Course complicated by atrial fibrillation with RVR (HR 140s), lopressor for rate control, improved. Discontinued TPN on 11/23. Bile culture positive for gram positive cocci. PICC line removed 11/24, blood cultures growing candida glabrata, infectious disease team was consulted, antibiotics broadened. TTE obtained on 11/24: mild concentric left ventricular hypertrophy, EF 65-70%, grade II left ventricular diastolic dysfunction with elevated filling pressure. Lactate uptrending, surveillance cultures sent. RUQ US: CBD only 5mm, contracted gallbladder, hepatic vessels patent; Bilateral upper extremity duplex: no evidence of DVT. Bile culture growing pseudomonas. Nephrology continued to follow patient, renal US on 12/1 negative for hydronephrosis, abdominal US negative for lesion and patent flow throughout. Bicarb gtt started on 12/1, labs trended. Bicarb gtt discontinued on 12/4 when improved. General surgery consulted for hyperbilirubinemia. +p-anca positive. PICC replaced on 12/4, TPN restarted. Repeat bedside US on 12/6 showing persistent bilateral pleural effusions, diuresed. Course significant for a fib w/ RVR, rate controlled accordingly. EBV positive on 12/8. Transfused 1U PRBC on 12/9 for hgb 7.5, repeat hgb 8.5. CMV negative. Continued to be IV fluid resuscitated as needed. Noted punctate skin bleed at wound on 12/18 requiring silver nitrate and surgicell. 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 Transfused 1U PRBC on 12/24 for hgb 7.3, repeat hgb improved. Holistic therapy nurse was consulted, followed throughout hospital stay. Transfused 1U PRBC for symptomatic anemia on 12/28, persistent punctuate wound bleeding, reinforced, held heparin. Attempted hemostasis of persistent bleed w/ suturing/cautery however unsuccessful. Hemostasis achieved w/ epi/saline gauze. Ileostomy reinforced, red rubber placed on 12/30. Perc cony tube capped on 1/2. TTE obtained on 1/4 showing: LVEF 75%, mild/moderate aortic regurg, PASP 40, RVEF wnl. Bedside ultrasound showing bilateral effusions. Bilateral lower extremity duplex obtained, no evidence of DVT. Right upper extremity PICC placed, left upper extremity removed on 1/5. Rate control medications continued to be adjusted accordingly, also started timolol eye drops for additional beta blocking effect. Started therapeutic lovenox on 1/10. Process started for approval of medication gattex on 1/10.    At the time of discharge, patient was hemodynamically stable to be discharged to __ with ___.          ***LAST UPDATED 1/12/2024**   78M w/ Crohn's, AFib/Flutter s/p DCCVs, remote ileocectomy and open appy, presented to [facility] with abdominal pain, outpatient CT concerning for dilated loops of small bowel and stomach, admitted for resolving SBO vs Crohn's flare, placed a nasogastric tube for decompression, plan for nonoperative management with serial abdominal exams and close hemodynamic monitoring. Course significant for uncontrolled hypertension, medicine team followed throughout their hospital stay, antihypertensives given accordingly. Given no progression of bowel function, decision was made to take patient to the OR on 6/26 for laparoscopic lysis of adhesions, converted to open lysis of adhesions, SBR x 3 and left in discontinuity, temporary abdominal closure. Transferred to SICU post op for hemodynamic monitoring, remained intubated. Course complicated by right sided pneumothorax, thoracic surgery was consulted, placed chest tube on 6/27. Patient returned to the OR on 6/28 for ex lap, removal of abthera, ileocolic resection, small bowel anastomosis, abdominal wall closure. Post operative hemoglobin downtrended to 8.5 from 9, given 1U PRBC. Started on TPN for nutritional optimization and successfully extubated to high flow NC on 6/29. Respiratory status continued to be monitored closely with serial CXRs, chest PT. Course complicated by fevers, altered mental status, fever workup obtained, CXR showing left lung consolidation/effusion, urinalysis negative, blood cultures negative. OR cultures growing enterococcus, infectious disease team consulted, antibiotics adjusted. PICC line was placed on 6/30, no complications. Patient was diuresed as necessary with lasix for fluid overload. Nasogastric tube was removed on 7/1. Wound vac changed 3 times/wk. Uptrending leukocytosis, CT chest AP was obtained on 7/2 showing small bowel malrotation, early/partial bowel obstruction vs post op ileus, small bilateral pleural effusions w/ bibasilar atelectasis. Transfused 1U PRBC for hemoglobin 7.7, responded appropriately. Diet was advanced as tolerated and per return of bowel function. Chest tube was removed on 7/3, repeat CXR with significant improvement. Patient was taken by interventional radiology team for perihepatic aspiration of serous fluid on 7/3. Patient returned to the OR on 7/5 for wound dehiscence for exlap, washout, ileocolic resection with end ileostomy, blow hole colostomy, fistula, red rubber from ileostomy to small bowel anastomosis; vicryl bridging mesh; R JOYCE below ileostomy, L JOYCE at small bowel enterotomy repair. SICU for post op monitoring.  Abdominal fluid growing C. tertium/Lactobacillus, antibiotics adjusted per ID team. Course complicated by post op fever, OR cultures growing rare yeast, started fluconazole. Successfully extubated on 7/6. Worked with ostomy nurses. Uncontrolled HTN, medications adjusted accordingly, transitioned to cardene gtt. Bedside US showing small bilateral pleural effusions, continued to be diuresed as necessary. TPN restarted, NGT and red rubber removed on 7/10, diet advanced as tolerated, cleared by speech and swallow evaluation for cough. Continued wound vac changes. Slight uptrending WBC post op, surveillance blood cultures sent, urine lytes sent, consistent with intrinsic renal disease. Echo obtained showing mild LVH w/ EF 65-70%. Liver function tests uptrending, ultrasound of abdomen completed showing no evidence of portal or hepatic venous thrombosis, fluid/sludge distended GB, no cholelithiasis or cholecystitis. Fever workup completed on 7/22 for Tmax 101. CT head/chest/abdomen/pelvis was obtained on 7/23 showing persistent ECF, Bilateral pleural effusions L>R. No acute intracranial hemorrhage. Blood cxs/MRSA/staph negative. Fistula had increasing output, wound care followed patient, imodium started for high output. Course complicated by acute delirium, all potential causes discontinued, stepped up to ICU care for further management, PICC line removed on 8/23, started empiric IV antibiotics for potential line sepsis. CT head obtained on 8/23 - normal scan, no hemorrhage or infarcts. Infectious workup obtained, blood cultures negative. TSH elevated, given IV synthroid. ICU course significant for hypoglycemia, hyponatremia, fevers - workup continued to be negative; elevated liver function tests. RUQ US obtained 8/26 showing distended gallbladder with sludge, no wall thickening/pericholecystic fluid, notable for right pleural effusion. CTAP obtained showing increased small R pleural effusion w/ atelectasis, no pneumoperitoneum. Diet advanced as tolerated, PICC and blood cultures remained negative. Patient clinically improved, was stepped down to telemetry on 8/28. Imodium increased per high ostomy output. PICC line placed on 9/1, TPN started for nutritional optimization. Patient was stepped up to ICU for severe ELVI, treated accordingly, mccauley placed. Restarted octreotide and lomotil for persistent high ostomy output. Mccauley removed on 9/3. Stepped down to telemetry when medically stable. Diet advanced as tolerated. Nephrology team consulted, urine lytes confirms SIADH. Course complicated by altered mental status, neurology team consulted, workup obtained. CT chest AP on 9/10 showing mild pulmonary edema, atelectasis vs pneumonia, no bowel obstruction, distended gallbladder w/ sludge. EEG obtained on 9/11: continuous mild generalized background slowing suggestive of a similar degree of diffuse or multifocal dysfunction; No epileptiform activity and no significant clinical events occurred. RUQ US consistent with distended gallbladder and small sludge, no cholelithiasis or acute cholecystitis. Taken for percutaneous cholecystectomy on 9/11. Continue IVF resuscitation for high ostomy output. Switched wound vac to wet to dry dressing changes, patient tolerating well. 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 AXR showing delayed gastric emptying given contrast remains in stomach, started erythromycin for gastroparesis. Wound care team consulted, switched wet to dry dressings to wound manager, restarted imodium. Patient worked with holistic therapy. Erythromycin eventually decreased and tapered off. Lomotil/imodium restarted, perc cony study performed, perc cony capped on 10/4. Diet advanced, fistula output increasing, uptrending BUN/Cr, IVF resuscitation as necessary. Started on cholestyramine on 10/18. Monitored BUN/Cr closely, FeNa 0.7% prerenal, continued IVF resuscitation. Dr. Yuan (GI) consulted for Crohn's management. Course significant for uptrending direct bilirubin with downtrending LFTs. T-tube uncapped on 10/25, tube study showing patent cystic/common bile ducts, minimal emptying into duodenum. LFTs/bilirubin continued to be trended. Creatinine uptrending, urine lytes showing intrinsic pathology. 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 Blood/fluid cultures sent. Started on high protein diet. Repeat CTAP on 11/1 showing no changes. Right upper extremity duplex obtained on 11/1 and consistent with brachial DVT. PICC line was replaced. Lovenox started for treatment of DVT. Hepatology followed patient during hospital stay, started ursodiol. Patient was transfused 1U PRBC on 11/6 for acute anemia, low anti Xa level, post transfusion CBC stable, started epo weekly. Left JOYCE drain removed on 11/7. Repeat upper extremity duplex on 11/10 negative for DVT. Course significant for new blood tinged output in eakins pouch, occult test positive for blood. Fungal cultures negative. Bedside US obtained for new shortness of breath on 11/13, consistent with small left pleural effusion, no right sided pleural effusions, echo normal. CT PE protocol obtained for persistent shortness of breath, showed no central PE; areas of groundglass attenuation involving the bilateral lung fields most pronounced at the [place] concerning for atypical pneumonia, edema or hemorrhage; moderate right and small left pleural effusion with atelectatic changes - diuresed as necessary. Diet advanced to low fat. RVP/MRSA swab negative, staph PCR positive. Repeat US on 11/18 showed small effusion, non worsening. New blood tinged output via ostomy, transfused 1U PRBC for acute anemia, achieved hemostasis with silver nitrate/surgicel, repeat CBC stable. Fever workup obtained on 11/22, antibiotics adjusted, urine lytes consistent with prerenal disease. Course complicated by atrial fibrillation with RVR (HR 140s), lopressor for rate control, improved. Discontinued TPN on 11/23. Bile culture positive for gram positive cocci. PICC line removed 11/24, blood cultures growing candida glabrata, infectious disease team was consulted, antibiotics broadened. TTE obtained on 11/24: mild concentric left ventricular hypertrophy, EF 65-70%, grade II left ventricular diastolic dysfunction with elevated filling pressure. Lactate uptrending, surveillance cultures sent. RUQ US: CBD only 5mm, contracted gallbladder, hepatic vessels patent; Bilateral upper extremity duplex: no evidence of DVT. Bile culture growing pseudomonas. Nephrology continued to follow patient, renal US on 12/1 negative for hydronephrosis, abdominal US negative for lesion and patent flow throughout. Bicarb gtt started on 12/1, labs trended. Bicarb gtt discontinued on 12/4 when improved. General surgery consulted for hyperbilirubinemia. +p-anca positive. PICC replaced on 12/4, TPN restarted. Repeat bedside US on 12/6 showing persistent bilateral pleural effusions, diuresed. Course significant for a fib w/ RVR, rate controlled accordingly. EBV positive on 12/8. Transfused 1U PRBC on 12/9 for hgb 7.5, repeat hgb 8.5. CMV negative. Continued to be IV fluid resuscitated as needed. Noted punctate skin bleed at wound on 12/18 requiring silver nitrate and surgicell. 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 Transfused 1U PRBC on 12/24 for hgb 7.3, repeat hgb improved. Holistic therapy nurse was consulted, followed throughout hospital stay. Transfused 1U PRBC for symptomatic anemia on 12/28, persistent punctuate wound bleeding, reinforced, held heparin. Attempted hemostasis of persistent bleed w/ suturing/cautery however unsuccessful. Hemostasis achieved w/ epi/saline gauze. Ileostomy reinforced, red rubber placed on 12/30. Perc cony tube capped on 1/2. TTE obtained on 1/4 showing: LVEF 75%, mild/moderate aortic regurg, PASP 40, RVEF wnl. Bedside ultrasound showing bilateral effusions. Bilateral lower extremity duplex obtained, no evidence of DVT. Right upper extremity PICC placed, left upper extremity removed on 1/5. Rate control medications continued to be adjusted accordingly, also started timolol eye drops for additional beta blocking effect. Started therapeutic lovenox on 1/10. Process started for approval of medication gattex on 1/10.    At the time of discharge, patient was hemodynamically stable to be discharged to __ with ___.          ***LAST UPDATED 1/12/2024**   78M w/ Crohn's, AFib/Flutter s/p DCCVs, remote ileocectomy and open appy, presented to [facility] with abdominal pain, outpatient CT concerning for dilated loops of small bowel and stomach, admitted for resolving SBO vs Crohn's flare, placed a nasogastric tube for decompression, plan for nonoperative management with serial abdominal exams and close hemodynamic monitoring. Course significant for uncontrolled hypertension, medicine team followed throughout their hospital stay, antihypertensives given accordingly. Given no progression of bowel function, decision was made to take patient to the OR on 6/26 for laparoscopic lysis of adhesions, converted to open lysis of adhesions, SBR x 3 and left in discontinuity, temporary abdominal closure. Transferred to SICU post op for hemodynamic monitoring, remained intubated. Course complicated by right sided pneumothorax, thoracic surgery was consulted, placed chest tube on 6/27. Patient returned to the OR on 6/28 for ex lap, removal of abthera, ileocolic resection, small bowel anastomosis, abdominal wall closure. Post operative hemoglobin downtrended to 8.5 from 9, given 1U PRBC. Started on TPN for nutritional optimization and successfully extubated to high flow NC on 6/29. Respiratory status continued to be monitored closely with serial CXRs, chest PT. Course complicated by fevers, altered mental status, fever workup obtained, CXR showing left lung consolidation/effusion, urinalysis negative, blood cultures negative. OR cultures growing enterococcus, infectious disease team consulted, antibiotics adjusted. PICC line was placed on 6/30, no complications. Patient was diuresed as necessary with lasix for fluid overload. Nasogastric tube was removed on 7/1. Wound vac changed 3 times/wk. Uptrending leukocytosis, CT chest AP was obtained on 7/2 showing small bowel malrotation, early/partial bowel obstruction vs post op ileus, small bilateral pleural effusions w/ bibasilar atelectasis. Transfused 1U PRBC for hemoglobin 7.7, responded appropriately. Diet was advanced as tolerated and per return of bowel function. Chest tube was removed on 7/3, repeat CXR with significant improvement. Patient was taken by interventional radiology team for perihepatic aspiration of serous fluid on 7/3. Patient returned to the OR on 7/5 for wound dehiscence for exlap, washout, ileocolic resection with end ileostomy, blow hole colostomy, fistula, red rubber from ileostomy to small bowel anastomosis; vicryl bridging mesh; R JOYCE below ileostomy, L JOYCE at small bowel enterotomy repair. SICU for post op monitoring.  Abdominal fluid growing C. tertium/Lactobacillus, antibiotics adjusted per ID team. Course complicated by post op fever, OR cultures growing rare yeast, started fluconazole. Successfully extubated on 7/6. Worked with ostomy nurses. Uncontrolled HTN, medications adjusted accordingly, transitioned to cardene gtt. Bedside US showing small bilateral pleural effusions, continued to be diuresed as necessary. TPN restarted, NGT and red rubber removed on 7/10, diet advanced as tolerated, cleared by speech and swallow evaluation for cough. Continued wound vac changes. Slight uptrending WBC post op, surveillance blood cultures sent, urine lytes sent, consistent with intrinsic renal disease. Echo obtained showing mild LVH w/ EF 65-70%. Liver function tests uptrending, ultrasound of abdomen completed showing no evidence of portal or hepatic venous thrombosis, fluid/sludge distended GB, no cholelithiasis or cholecystitis. Fever workup completed on 7/22 for Tmax 101. CT head/chest/abdomen/pelvis was obtained on 7/23 showing persistent ECF, Bilateral pleural effusions L>R. No acute intracranial hemorrhage. Blood cxs/MRSA/staph negative. Fistula had increasing output, wound care followed patient, imodium started for high output. Course complicated by acute delirium, all potential causes discontinued, stepped up to ICU care for further management, PICC line removed on 8/23, started empiric IV antibiotics for potential line sepsis. CT head obtained on 8/23 - normal scan, no hemorrhage or infarcts. Infectious workup obtained, blood cultures negative. TSH elevated, given IV synthroid. ICU course significant for hypoglycemia, hyponatremia, fevers - workup continued to be negative; elevated liver function tests. RUQ US obtained 8/26 showing distended gallbladder with sludge, no wall thickening/pericholecystic fluid, notable for right pleural effusion. CTAP obtained showing increased small R pleural effusion w/ atelectasis, no pneumoperitoneum. Diet advanced as tolerated, PICC and blood cultures remained negative. Patient clinically improved, was stepped down to telemetry on 8/28. Imodium increased per high ostomy output. PICC line placed on 9/1, TPN started for nutritional optimization. Patient was stepped up to ICU for severe ELVI, treated accordingly, mccauley placed. Restarted octreotide and lomotil for persistent high ostomy output. Mccauley removed on 9/3. Stepped down to telemetry when medically stable. Diet advanced as tolerated. Nephrology team consulted, urine lytes confirms SIADH. Course complicated by altered mental status, neurology team consulted, workup obtained. CT chest AP on 9/10 showing mild pulmonary edema, atelectasis vs pneumonia, no bowel obstruction, distended gallbladder w/ sludge. EEG obtained on 9/11: continuous mild generalized background slowing suggestive of a similar degree of diffuse or multifocal dysfunction; No epileptiform activity and no significant clinical events occurred. RUQ US consistent with distended gallbladder and small sludge, no cholelithiasis or acute cholecystitis. Taken for percutaneous cholecystectomy on 9/11. Continue IVF resuscitation for high ostomy output. Switched wound vac to wet to dry dressing changes, patient tolerating well. 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 AXR showing delayed gastric emptying given contrast remains in stomach, started erythromycin for gastroparesis. Wound care team consulted, switched wet to dry dressings to wound manager, restarted imodium. Patient worked with holistic therapy. Erythromycin eventually decreased and tapered off. Lomotil/imodium restarted, perc cony study performed, perc cony capped on 10/4. Diet advanced, fistula output increasing, uptrending BUN/Cr, IVF resuscitation as necessary. Started on cholestyramine on 10/18. Monitored BUN/Cr closely, FeNa 0.7% prerenal, continued IVF resuscitation. Dr. Yuan (GI) consulted for Crohn's management. Course significant for uptrending direct bilirubin with downtrending LFTs. T-tube uncapped on 10/25, tube study showing patent cystic/common bile ducts, minimal emptying into duodenum. LFTs/bilirubin continued to be trended. Creatinine uptrending, urine lytes showing intrinsic pathology. 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 Blood/fluid cultures sent. Started on high protein diet. Repeat CTAP on 11/1 showing no changes. Right upper extremity duplex obtained on 11/1 and consistent with brachial DVT. PICC line was replaced. Lovenox started for treatment of DVT. Hepatology followed patient during hospital stay, started ursodiol. Patient was transfused 1U PRBC on 11/6 for acute anemia, low anti Xa level, post transfusion CBC stable, started epo weekly. Left JOYCE drain removed on 11/7. Repeat upper extremity duplex on 11/10 negative for DVT. Course significant for new blood tinged output in eakins pouch, occult test positive for blood. Fungal cultures negative. Bedside US obtained for new shortness of breath on 11/13, consistent with small left pleural effusion, no right sided pleural effusions, echo normal. CT PE protocol obtained for persistent shortness of breath, showed no central PE; areas of groundglass attenuation involving the bilateral lung fields most pronounced at the AL[place] concerning for atypical pneumonia, edema or hemorrhage; moderate right and small left pleural effusion with atelectatic changes - diuresed as necessary. Diet advanced to low fat. RVP/MRSA swab negative, staph PCR positive. Repeat US on 11/18 showed small effusion, non worsening. New blood tinged output via ostomy, transfused 1U PRBC for acute anemia, achieved hemostasis with silver nitrate/surgicel, repeat CBC stable. Fever workup obtained on 11/22, antibiotics adjusted, urine lytes consistent with prerenal disease. Course complicated by atrial fibrillation with RVR (HR 140s), lopressor for rate control, improved. Discontinued TPN on 11/23. Bile culture positive for gram positive cocci. PICC line removed 11/24, blood cultures growing candida glabrata, infectious disease team was consulted, antibiotics broadened. TTE obtained on 11/24: mild concentric left ventricular hypertrophy, EF 65-70%, grade II left ventricular diastolic dysfunction with elevated filling pressure. Lactate uptrending, surveillance cultures sent. RUQ US: CBD only 5mm, contracted gallbladder, hepatic vessels patent; Bilateral upper extremity duplex: no evidence of DVT. Bile culture growing pseudomonas. Nephrology continued to follow patient, renal US on 12/1 negative for hydronephrosis, abdominal US negative for lesion and patent flow throughout. Bicarb gtt started on 12/1, labs trended. Bicarb gtt discontinued on 12/4 when improved. General surgery consulted for hyperbilirubinemia. +p-anca positive. PICC replaced on 12/4, TPN restarted. Repeat bedside US on 12/6 showing persistent bilateral pleural effusions, diuresed. Course significant for a fib w/ RVR, rate controlled accordingly. EBV positive on 12/8. Transfused 1U PRBC on 12/9 for hgb 7.5, repeat hgb 8.5. CMV negative. Continued to be IV fluid resuscitated as needed. Noted punctate skin bleed at wound on 12/18 requiring silver nitrate and surgicell. 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 Transfused 1U PRBC on 12/24 for hgb 7.3, repeat hgb improved. Holistic therapy nurse was consulted, followed throughout hospital stay. Transfused 1U PRBC for symptomatic anemia on 12/28, persistent punctuate wound bleeding, reinforced, held heparin. Attempted hemostasis of persistent bleed w/ suturing/cautery however unsuccessful. Hemostasis achieved w/ epi/saline gauze. Ileostomy reinforced, red rubber placed on 12/30. Perc cony tube capped on 1/2. TTE obtained on 1/4 showing: LVEF 75%, mild/moderate aortic regurg, PASP 40, RVEF wnl. Bedside ultrasound showing bilateral effusions. Bilateral lower extremity duplex obtained, no evidence of DVT. Right upper extremity PICC placed, left upper extremity removed on 1/5. Rate control medications continued to be adjusted accordingly, also started timolol eye drops for additional beta blocking effect. Started therapeutic lovenox on 1/10. Process started for approval of medication gattex on 1/10.    At the time of discharge, patient was hemodynamically stable to be discharged to __ with ___.          ***LAST UPDATED 1/24/2024**     78M w/ Crohn's, AFib/Flutter s/p DCCVs, remote ileocectomy and open appy, presented to [facility] with abdominal pain, outpatient CT concerning for dilated loops of small bowel and stomach, admitted for resolving SBO vs Crohn's flare, placed a nasogastric tube for decompression, plan for nonoperative management with serial abdominal exams and close hemodynamic monitoring. Course significant for uncontrolled hypertension, medicine team followed throughout their hospital stay, antihypertensives given accordingly. Given no progression of bowel function, decision was made to take patient to the OR on 6/26 for laparoscopic lysis of adhesions, converted to open lysis of adhesions, SBR x 3 and left in discontinuity, temporary abdominal closure. Transferred to SICU post op for hemodynamic monitoring, remained intubated. Course complicated by right sided pneumothorax, thoracic surgery was consulted, placed chest tube on 6/27. Patient returned to the OR on 6/28 for ex lap, removal of abthera, ileocolic resection, small bowel anastomosis, abdominal wall closure. Post operative hemoglobin downtrended to 8.5 from 9, given 1U PRBC. Started on TPN for nutritional optimization and successfully extubated to high flow NC on 6/29. Respiratory status continued to be monitored closely with serial CXRs, chest PT. Course complicated by fevers, altered mental status, fever workup obtained, CXR showing left lung consolidation/effusion, urinalysis negative, blood cultures negative. OR cultures growing enterococcus, infectious disease team consulted, antibiotics adjusted. PICC line was placed on 6/30, no complications. Patient was diuresed as necessary with lasix for fluid overload. Nasogastric tube was removed on 7/1. Wound vac changed 3 times/wk. Uptrending leukocytosis, CT chest AP was obtained on 7/2 showing small bowel malrotation, early/partial bowel obstruction vs post op ileus, small bilateral pleural effusions w/ bibasilar atelectasis. Transfused 1U PRBC for hemoglobin 7.7, responded appropriately. Diet was advanced as tolerated and per return of bowel function. Chest tube was removed on 7/3, repeat CXR with significant improvement. Patient was taken by interventional radiology team for perihepatic aspiration of serous fluid on 7/3. Patient returned to the OR on 7/5 for wound dehiscence for exlap, washout, ileocolic resection with end ileostomy, blow hole colostomy, fistula, red rubber from ileostomy to small bowel anastomosis; vicryl bridging mesh; R JOYCE below ileostomy, L JOYCE at small bowel enterotomy repair. SICU for post op monitoring.  Abdominal fluid growing C. tertium/Lactobacillus, antibiotics adjusted per ID team. Course complicated by post op fever, OR cultures growing rare yeast, started fluconazole. Successfully extubated on 7/6. Worked with ostomy nurses. Uncontrolled HTN, medications adjusted accordingly, transitioned to cardene gtt. Bedside US showing small bilateral pleural effusions, continued to be diuresed as necessary. TPN restarted, NGT and red rubber removed on 7/10, diet advanced as tolerated, cleared by speech and swallow evaluation for cough. Continued wound vac changes. Slight uptrending WBC post op, surveillance blood cultures sent, urine lytes sent, consistent with intrinsic renal disease. Echo obtained showing mild LVH w/ EF 65-70%. Liver function tests uptrending, ultrasound of abdomen completed showing no evidence of portal or hepatic venous thrombosis, fluid/sludge distended GB, no cholelithiasis or cholecystitis. Fever workup completed on 7/22 for Tmax 101. CT head/chest/abdomen/pelvis was obtained on 7/23 showing persistent ECF, Bilateral pleural effusions L>R. No acute intracranial hemorrhage. Blood cxs/MRSA/staph negative. Fistula had increasing output, wound care followed patient, imodium started for high output. Course complicated by acute delirium, all potential causes discontinued, stepped up to ICU care for further management, PICC line removed on 8/23, started empiric IV antibiotics for potential line sepsis. CT head obtained on 8/23 - normal scan, no hemorrhage or infarcts. Infectious workup obtained, blood cultures negative. TSH elevated, given IV synthroid. ICU course significant for hypoglycemia, hyponatremia, fevers - workup continued to be negative; elevated liver function tests. RUQ US obtained 8/26 showing distended gallbladder with sludge, no wall thickening/pericholecystic fluid, notable for right pleural effusion. CTAP obtained showing increased small R pleural effusion w/ atelectasis, no pneumoperitoneum. Diet advanced as tolerated, PICC and blood cultures remained negative. Patient clinically improved, was stepped down to telemetry on 8/28. Imodium increased per high ostomy output. PICC line placed on 9/1, TPN started for nutritional optimization. Patient was stepped up to ICU for severe ELVI, treated accordingly, mccauley placed. Restarted octreotide and lomotil for persistent high ostomy output. Mccauley removed on 9/3. Stepped down to telemetry when medically stable. Diet advanced as tolerated. Nephrology team consulted, urine lytes confirms SIADH. Course complicated by altered mental status, neurology team consulted, workup obtained. CT chest AP on 9/10 showing mild pulmonary edema, atelectasis vs pneumonia, no bowel obstruction, distended gallbladder w/ sludge. EEG obtained on 9/11: continuous mild generalized background slowing suggestive of a similar degree of diffuse or multifocal dysfunction; No epileptiform activity and no significant clinical events occurred. RUQ US consistent with distended gallbladder and small sludge, no cholelithiasis or acute cholecystitis. Taken for percutaneous cholecystectomy on 9/11. Continue IVF resuscitation for high ostomy output. Switched wound vac to wet to dry dressing changes, patient tolerating well. 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 AXR showing delayed gastric emptying given contrast remains in stomach, started erythromycin for gastroparesis. Wound care team consulted, switched wet to dry dressings to wound manager, restarted imodium. Patient worked with holistic therapy. Erythromycin eventually decreased and tapered off. Lomotil/imodium restarted, perc cony study performed, perc cony capped on 10/4. Diet advanced, fistula output increasing, uptrending BUN/Cr, IVF resuscitation as necessary. Started on cholestyramine on 10/18. Monitored BUN/Cr closely, FeNa 0.7% prerenal, continued IVF resuscitation. Dr. Yuan (GI) consulted for Crohn's management. Course significant for uptrending direct bilirubin with downtrending LFTs. T-tube uncapped on 10/25, tube study showing patent cystic/common bile ducts, minimal emptying into duodenum. LFTs/bilirubin continued to be trended. Creatinine uptrending, urine lytes showing intrinsic pathology. 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 Blood/fluid cultures sent. Started on high protein diet. Repeat CTAP on 11/1 showing no changes. Right upper extremity duplex obtained on 11/1 and consistent with brachial DVT. PICC line was replaced. Lovenox started for treatment of DVT. Hepatology followed patient during hospital stay, started ursodiol. Patient was transfused 1U PRBC on 11/6 for acute anemia, low anti Xa level, post transfusion CBC stable, started epo weekly. Left JOYCE drain removed on 11/7. Repeat upper extremity duplex on 11/10 negative for DVT. Course significant for new blood tinged output in eakins pouch, occult test positive for blood. Fungal cultures negative. Bedside US obtained for new shortness of breath on 11/13, consistent with small left pleural effusion, no right sided pleural effusions, echo normal. CT PE protocol obtained for persistent shortness of breath, showed no central PE; areas of groundglass attenuation involving the bilateral lung fields most pronounced at the [place] concerning for atypical pneumonia, edema or hemorrhage; moderate right and small left pleural effusion with atelectatic changes - diuresed as necessary. Diet advanced to low fat. RVP/MRSA swab negative, staph PCR positive. Repeat US on 11/18 showed small effusion, non worsening. New blood tinged output via ostomy, transfused 1U PRBC for acute anemia, achieved hemostasis with silver nitrate/surgicel, repeat CBC stable. Fever workup obtained on 11/22, antibiotics adjusted, urine lytes consistent with prerenal disease. Course complicated by atrial fibrillation with RVR (HR 140s), lopressor for rate control, improved. Discontinued TPN on 11/23. Bile culture positive for gram positive cocci. PICC line removed 11/24, blood cultures growing candida glabrata, infectious disease team was consulted, antibiotics broadened. TTE obtained on 11/24: mild concentric left ventricular hypertrophy, EF 65-70%, grade II left ventricular diastolic dysfunction with elevated filling pressure. Lactate uptrending, surveillance cultures sent. RUQ US: CBD only 5mm, contracted gallbladder, hepatic vessels patent; Bilateral upper extremity duplex: no evidence of DVT. Bile culture growing pseudomonas. Nephrology continued to follow patient, renal US on 12/1 negative for hydronephrosis, abdominal US negative for lesion and patent flow throughout. Bicarb gtt started on 12/1, labs trended. Bicarb gtt discontinued on 12/4 when improved. General surgery consulted for hyperbilirubinemia. +p-anca positive. PICC replaced on 12/4, TPN restarted. Repeat bedside US on 12/6 showing persistent bilateral pleural effusions, diuresed. Course significant for a fib w/ RVR, rate controlled accordingly. EBV positive on 12/8. Transfused 1U PRBC on 12/9 for hgb 7.5, repeat hgb 8.5. CMV negative. Continued to be IV fluid resuscitated as needed. Noted punctate skin bleed at wound on 12/18 requiring silver nitrate and surgicell. 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 Transfused 1U PRBC on 12/24 for hgb 7.3, repeat hgb improved. Holistic therapy nurse was consulted, followed throughout hospital stay. Transfused 1U PRBC for symptomatic anemia on 12/28, persistent punctuate wound bleeding, reinforced, held heparin. Attempted hemostasis of persistent bleed w/ suturing/cautery however unsuccessful. Hemostasis achieved w/ epi/saline gauze. Ileostomy reinforced, red rubber placed on 12/30. Perc cony tube capped on 1/2. TTE obtained on 1/4 showing: LVEF 75%, mild/moderate aortic regurg, PASP 40, RVEF wnl. Bedside ultrasound showing bilateral effusions. Bilateral lower extremity duplex obtained, no evidence of DVT. Right upper extremity PICC placed, left upper extremity removed on 1/5. Rate control medications continued to be adjusted accordingly, also started timolol eye drops for additional beta blocking effect. Started therapeutic lovenox on 1/10. Process started for approval of medication gattex on 1/10.    At the time of discharge, patient was hemodynamically stable to be discharged to __ with ___.          ***LAST UPDATED 1/24/2024**     78M w/ Crohn's, AFib/Flutter s/p DCCVs, remote ileocectomy and open appy, presented to [facility] with abdominal pain, outpatient CT concerning for dilated loops of small bowel and stomach, admitted for resolving SBO vs Crohn's flare, placed a nasogastric tube for decompression, plan for nonoperative management with serial abdominal exams and close hemodynamic monitoring. Course significant for uncontrolled hypertension, medicine team followed throughout their hospital stay, antihypertensives given accordingly. Given no progression of bowel function, decision was made to take patient to the OR on 6/26 for laparoscopic lysis of adhesions, converted to open lysis of adhesions, SBR x 3 and left in discontinuity, temporary abdominal closure. Transferred to SICU post op for hemodynamic monitoring, remained intubated. Course complicated by right sided pneumothorax, thoracic surgery was consulted, placed chest tube on 6/27. Patient returned to the OR on 6/28 for ex lap, removal of abthera, ileocolic resection, small bowel anastomosis, abdominal wall closure. Post operative hemoglobin downtrended to 8.5 from 9, given 1U PRBC. Started on TPN for nutritional optimization and successfully extubated to high flow NC on 6/29. Respiratory status continued to be monitored closely with serial CXRs, chest PT. Course complicated by fevers, altered mental status, fever workup obtained, CXR showing left lung consolidation/effusion, urinalysis negative, blood cultures negative. OR cultures growing enterococcus, infectious disease team consulted, antibiotics adjusted. PICC line was placed on 6/30, no complications. Patient was diuresed as necessary with lasix for fluid overload. Nasogastric tube was removed on 7/1. Wound vac changed 3 times/wk. Uptrending leukocytosis, CT chest AP was obtained on 7/2 showing small bowel malrotation, early/partial bowel obstruction vs post op ileus, small bilateral pleural effusions w/ bibasilar atelectasis. Transfused 1U PRBC for hemoglobin 7.7, responded appropriately. Diet was advanced as tolerated and per return of bowel function. Chest tube was removed on 7/3, repeat CXR with significant improvement. Patient was taken by interventional radiology team for perihepatic aspiration of serous fluid on 7/3. Patient returned to the OR on 7/5 for wound dehiscence for exlap, washout, ileocolic resection with end ileostomy, blow hole colostomy, fistula, red rubber from ileostomy to small bowel anastomosis; vicryl bridging mesh; R JOYCE below ileostomy, L JOYCE at small bowel enterotomy repair. SICU for post op monitoring.  Abdominal fluid growing C. tertium/Lactobacillus, antibiotics adjusted per ID team. Course complicated by post op fever, OR cultures growing rare yeast, started fluconazole. Successfully extubated on 7/6. Worked with ostomy nurses. Uncontrolled HTN, medications adjusted accordingly, transitioned to cardene gtt. Bedside US showing small bilateral pleural effusions, continued to be diuresed as necessary. TPN restarted, NGT and red rubber removed on 7/10, diet advanced as tolerated, cleared by speech and swallow evaluation for cough. Continued wound vac changes. Slight uptrending WBC post op, surveillance blood cultures sent, urine lytes sent, consistent with intrinsic renal disease. Echo obtained showing mild LVH w/ EF 65-70%. Liver function tests uptrending, ultrasound of abdomen completed showing no evidence of portal or hepatic venous thrombosis, fluid/sludge distended GB, no cholelithiasis or cholecystitis. Fever workup completed on 7/22 for Tmax 101. CT head/chest/abdomen/pelvis was obtained on 7/23 showing persistent ECF, Bilateral pleural effusions L>R. No acute intracranial hemorrhage. Blood cxs/MRSA/staph negative. Fistula had increasing output, wound care followed patient, imodium started for high output. Course complicated by acute delirium, all potential causes discontinued, stepped up to ICU care for further management, PICC line removed on 8/23, started empiric IV antibiotics for potential line sepsis. CT head obtained on 8/23 - normal scan, no hemorrhage or infarcts. Infectious workup obtained, blood cultures negative. TSH elevated, given IV synthroid. ICU course significant for hypoglycemia, hyponatremia, fevers - workup continued to be negative; elevated liver function tests. RUQ US obtained 8/26 showing distended gallbladder with sludge, no wall thickening/pericholecystic fluid, notable for right pleural effusion. CTAP obtained showing increased small R pleural effusion w/ atelectasis, no pneumoperitoneum. Diet advanced as tolerated, PICC and blood cultures remained negative. Patient clinically improved, was stepped down to telemetry on 8/28. Imodium increased per high ostomy output. PICC line placed on 9/1, TPN started for nutritional optimization. Patient was stepped up to ICU for severe ELVI, treated accordingly, mccauley placed. Restarted octreotide and lomotil for persistent high ostomy output. Mccauley removed on 9/3. Stepped down to telemetry when medically stable. Diet advanced as tolerated. Nephrology team consulted, urine lytes confirms SIADH. Course complicated by altered mental status, neurology team consulted, workup obtained. CT chest AP on 9/10 showing mild pulmonary edema, atelectasis vs pneumonia, no bowel obstruction, distended gallbladder w/ sludge. EEG obtained on 9/11: continuous mild generalized background slowing suggestive of a similar degree of diffuse or multifocal dysfunction; No epileptiform activity and no significant clinical events occurred. RUQ US consistent with distended gallbladder and small sludge, no cholelithiasis or acute cholecystitis. Taken for percutaneous cholecystectomy on 9/11. Continue IVF resuscitation for high ostomy output. Switched wound vac to wet to dry dressing changes, patient tolerating well. 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 AXR showing delayed gastric emptying given contrast remains in stomach, started erythromycin for gastroparesis. Wound care team consulted, switched wet to dry dressings to wound manager, restarted imodium. Patient worked with holistic therapy. Erythromycin eventually decreased and tapered off. Lomotil/imodium restarted, perc cony study performed, perc cony capped on 10/4. Diet advanced, fistula output increasing, uptrending BUN/Cr, IVF resuscitation as necessary. Started on cholestyramine on 10/18. Monitored BUN/Cr closely, FeNa 0.7% prerenal, continued IVF resuscitation. Dr. Yuan (GI) consulted for Crohn's management. Course significant for uptrending direct bilirubin with downtrending LFTs. T-tube uncapped on 10/25, tube study showing patent cystic/common bile ducts, minimal emptying into duodenum. LFTs/bilirubin continued to be trended. Creatinine uptrending, urine lytes showing intrinsic pathology. 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 Blood/fluid cultures sent. Started on high protein diet. Repeat CTAP on 11/1 showing no changes. Right upper extremity duplex obtained on 11/1 and consistent with brachial DVT. PICC line was replaced. Lovenox started for treatment of DVT. Hepatology followed patient during hospital stay, started ursodiol. Patient was transfused 1U PRBC on 11/6 for acute anemia, low anti Xa level, post transfusion CBC stable, started epo weekly. Left JOYCE drain removed on 11/7. Repeat upper extremity duplex on 11/10 negative for DVT. Course significant for new blood tinged output in eakins pouch, occult test positive for blood. Fungal cultures negative. Bedside US obtained for new shortness of breath on 11/13, consistent with small left pleural effusion, no right sided pleural effusions, echo normal. CT PE protocol obtained for persistent shortness of breath, showed no central PE; areas of groundglass attenuation involving the bilateral lung fields most pronounced at the ALDEN concerning for atypical pneumonia, edema or hemorrhage; moderate right and small left pleural effusion with atelectatic changes - diuresed as necessary. Diet advanced to low fat. RVP/MRSA swab negative, staph PCR positive. Repeat US on 11/18 showed small effusion, non worsening. New blood tinged output via ostomy, transfused 1U PRBC for acute anemia, achieved hemostasis with silver nitrate/surgicel, repeat CBC stable. Fever workup obtained on 11/22, antibiotics adjusted, urine lytes consistent with prerenal disease. Course complicated by atrial fibrillation with RVR (HR 140s), lopressor for rate control, improved. Discontinued TPN on 11/23. Bile culture positive for gram positive cocci. PICC line removed 11/24, blood cultures growing candida glabrata, infectious disease team was consulted, antibiotics broadened. TTE obtained on 11/24: mild concentric left ventricular hypertrophy, EF 65-70%, grade II left ventricular diastolic dysfunction with elevated filling pressure. Lactate uptrending, surveillance cultures sent. RUQ US: CBD only 5mm, contracted gallbladder, hepatic vessels patent; Bilateral upper extremity duplex: no evidence of DVT. Bile culture growing pseudomonas. Nephrology continued to follow patient, renal US on 12/1 negative for hydronephrosis, abdominal US negative for lesion and patent flow throughout. Bicarb gtt started on 12/1, labs trended. Bicarb gtt discontinued on 12/4 when improved. General surgery consulted for hyperbilirubinemia. +p-anca positive. PICC replaced on 12/4, TPN restarted. Repeat bedside US on 12/6 showing persistent bilateral pleural effusions, diuresed. Course significant for a fib w/ RVR, rate controlled accordingly. EBV positive on 12/8. Transfused 1U PRBC on 12/9 for hgb 7.5, repeat hgb 8.5. CMV negative. Continued to be IV fluid resuscitated as needed. Noted punctate skin bleed at wound on 12/18 requiring silver nitrate and surgicell. 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 Transfused 1U PRBC on 12/24 for hgb 7.3, repeat hgb improved. Holistic therapy nurse was consulted, followed throughout hospital stay. Transfused 1U PRBC for symptomatic anemia on 12/28, persistent punctuate wound bleeding, reinforced, held heparin. Attempted hemostasis of persistent bleed w/ suturing/cautery however unsuccessful. Hemostasis achieved w/ epi/saline gauze. Ileostomy reinforced, red rubber placed on 12/30. Perc cony tube capped on 1/2. TTE obtained on 1/4 showing: LVEF 75%, mild/moderate aortic regurg, PASP 40, RVEF wnl. Bedside ultrasound showing bilateral effusions. Bilateral lower extremity duplex obtained, no evidence of DVT. Right upper extremity PICC placed, left upper extremity removed on 1/5. Rate control medications continued to be adjusted accordingly, also started timolol eye drops for additional beta blocking effect. Started therapeutic lovenox on 1/10. Process started for approval of medication gattex on 1/10. Persistent ileostomy bleeding managed with manual pressure. Uptrending WBC, full workup completed, CXR showing left basilar opacity, likely atelectasis vs pneumonia, urinalysis negative, started on IV cefepime per infectious disease team. Perc cony recapped on 1/17, started on prophylactic lovenox. Cardiology and EP followed for symptomatic sinus pause of 10 seconds, likely vagal, restarted metoprolol and timolol. Lasix given for increasing bilateral pleural effusions seen on POCUS. Continued to be transfused as needed for bleeding/anemia. Given first dose of gattex on 1/24. Continued to be diuresed for edema/effusions.     At the time of discharge, patient was hemodynamically stable to be discharged to __ with ___.          ***LAST UPDATED 1/24/2024**   78M w/ Crohn's, AFib/Flutter s/p DCCVs, remote ileocectomy and open appy, presented to [facility] with abdominal pain, outpatient CT concerning for dilated loops of small bowel and stomach, admitted for resolving SBO vs Crohn's flare, placed a nasogastric tube for decompression, plan for nonoperative management with serial abdominal exams and close hemodynamic monitoring. Course significant for uncontrolled hypertension, medicine team followed throughout their hospital stay, antihypertensives given accordingly. Given no progression of bowel function, decision was made to take patient to the OR on 6/26 for laparoscopic lysis of adhesions, converted to open lysis of adhesions, SBR x 3 and left in discontinuity, temporary abdominal closure. Transferred to SICU post op for hemodynamic monitoring, remained intubated. Course complicated by right sided pneumothorax, thoracic surgery was consulted, placed chest tube on 6/27. Patient returned to the OR on 6/28 for ex lap, removal of abthera, ileocolic resection, small bowel anastomosis, abdominal wall closure. Post operative hemoglobin downtrended to 8.5 from 9, given 1U PRBC. Started on TPN for nutritional optimization and successfully extubated to high flow NC on 6/29. Respiratory status continued to be monitored closely with serial CXRs, chest PT. Course complicated by fevers, altered mental status, fever workup obtained, CXR showing left lung consolidation/effusion, urinalysis negative, blood cultures negative. OR cultures growing enterococcus, infectious disease team consulted, antibiotics adjusted. PICC line was placed on 6/30, no complications. Patient was diuresed as necessary with lasix for fluid overload. Nasogastric tube was removed on 7/1. Wound vac changed 3 times/wk. Uptrending leukocytosis, CT chest AP was obtained on 7/2 showing small bowel malrotation, early/partial bowel obstruction vs post op ileus, small bilateral pleural effusions w/ bibasilar atelectasis. Transfused 1U PRBC for hemoglobin 7.7, responded appropriately. Diet was advanced as tolerated and per return of bowel function. Chest tube was removed on 7/3, repeat CXR with significant improvement. Patient was taken by interventional radiology team for perihepatic aspiration of serous fluid on 7/3. Patient returned to the OR on 7/5 for wound dehiscence for exlap, washout, ileocolic resection with end ileostomy, blow hole colostomy, fistula, red rubber from ileostomy to small bowel anastomosis; vicryl bridging mesh; R JOYCE below ileostomy, L JOYCE at small bowel enterotomy repair. SICU for post op monitoring.  Abdominal fluid growing C. tertium/Lactobacillus, antibiotics adjusted per ID team. Course complicated by post op fever, OR cultures growing rare yeast, started fluconazole. Successfully extubated on 7/6. Worked with ostomy nurses. Uncontrolled HTN, medications adjusted accordingly, transitioned to cardene gtt. Bedside US showing small bilateral pleural effusions, continued to be diuresed as necessary. TPN restarted, NGT and red rubber removed on 7/10, diet advanced as tolerated, cleared by speech and swallow evaluation for cough. Continued wound vac changes. Slight uptrending WBC post op, surveillance blood cultures sent, urine lytes sent, consistent with intrinsic renal disease. Echo obtained showing mild LVH w/ EF 65-70%. Liver function tests uptrending, ultrasound of abdomen completed showing no evidence of portal or hepatic venous thrombosis, fluid/sludge distended GB, no cholelithiasis or cholecystitis. Fever workup completed on 7/22 for Tmax 101. CT head/chest/abdomen/pelvis was obtained on 7/23 showing persistent ECF, Bilateral pleural effusions L>R. No acute intracranial hemorrhage. Blood cxs/MRSA/staph negative. Fistula had increasing output, wound care followed patient, imodium started for high output. Course complicated by acute delirium, all potential causes discontinued, stepped up to ICU care for further management, PICC line removed on 8/23, started empiric IV antibiotics for potential line sepsis. CT head obtained on 8/23 - normal scan, no hemorrhage or infarcts. Infectious workup obtained, blood cultures negative. TSH elevated, given IV synthroid. ICU course significant for hypoglycemia, hyponatremia, fevers - workup continued to be negative; elevated liver function tests. RUQ US obtained 8/26 showing distended gallbladder with sludge, no wall thickening/pericholecystic fluid, notable for right pleural effusion. CTAP obtained showing increased small R pleural effusion w/ atelectasis, no pneumoperitoneum. Diet advanced as tolerated, PICC and blood cultures remained negative. Patient clinically improved, was stepped down to telemetry on 8/28. Imodium increased per high ostomy output. PICC line placed on 9/1, TPN started for nutritional optimization. Patient was stepped up to ICU for severe ELVI, treated accordingly, mccauley placed. Restarted octreotide and lomotil for persistent high ostomy output. Mccauley removed on 9/3. Stepped down to telemetry when medically stable. Diet advanced as tolerated. Nephrology team consulted, urine lytes confirms SIADH. Course complicated by altered mental status, neurology team consulted, workup obtained. CT chest AP on 9/10 showing mild pulmonary edema, atelectasis vs pneumonia, no bowel obstruction, distended gallbladder w/ sludge. EEG obtained on 9/11: continuous mild generalized background slowing suggestive of a similar degree of diffuse or multifocal dysfunction; No epileptiform activity and no significant clinical events occurred. RUQ US consistent with distended gallbladder and small sludge, no cholelithiasis or acute cholecystitis. Taken for percutaneous cholecystectomy on 9/11. Continue IVF resuscitation for high ostomy output. Switched wound vac to wet to dry dressing changes, patient tolerating well. 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 AXR showing delayed gastric emptying given contrast remains in stomach, started erythromycin for gastroparesis. Wound care team consulted, switched wet to dry dressings to wound manager, restarted imodium. Patient worked with holistic therapy. Erythromycin eventually decreased and tapered off. Lomotil/imodium restarted, perc cony study performed, perc cony capped on 10/4. Diet advanced, fistula output increasing, uptrending BUN/Cr, IVF resuscitation as necessary. Started on cholestyramine on 10/18. Monitored BUN/Cr closely, FeNa 0.7% prerenal, continued IVF resuscitation. Dr. Yuan (GI) consulted for Crohn's management. Course significant for uptrending direct bilirubin with downtrending LFTs. T-tube uncapped on 10/25, tube study showing patent cystic/common bile ducts, minimal emptying into duodenum. LFTs/bilirubin continued to be trended. Creatinine uptrending, urine lytes showing intrinsic pathology. 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 Blood/fluid cultures sent. Started on high protein diet. Repeat CTAP on 11/1 showing no changes. Right upper extremity duplex obtained on 11/1 and consistent with brachial DVT. PICC line was replaced. Lovenox started for treatment of DVT. Hepatology followed patient during hospital stay, started ursodiol. Patient was transfused 1U PRBC on 11/6 for acute anemia, low anti Xa level, post transfusion CBC stable, started epo weekly. Left JOYCE drain removed on 11/7. Repeat upper extremity duplex on 11/10 negative for DVT. Course significant for new blood tinged output in eakins pouch, occult test positive for blood. Fungal cultures negative. Bedside US obtained for new shortness of breath on 11/13, consistent with small left pleural effusion, no right sided pleural effusions, echo normal. CT PE protocol obtained for persistent shortness of breath, showed no central PE; areas of groundglass attenuation involving the bilateral lung fields most pronounced at the [place] concerning for atypical pneumonia, edema or hemorrhage; moderate right and small left pleural effusion with atelectatic changes - diuresed as necessary. Diet advanced to low fat. RVP/MRSA swab negative, staph PCR positive. Repeat US on 11/18 showed small effusion, non worsening. New blood tinged output via ostomy, transfused 1U PRBC for acute anemia, achieved hemostasis with silver nitrate/surgicel, repeat CBC stable. Fever workup obtained on 11/22, antibiotics adjusted, urine lytes consistent with prerenal disease. Course complicated by atrial fibrillation with RVR (HR 140s), lopressor for rate control, improved. Discontinued TPN on 11/23. Bile culture positive for gram positive cocci. PICC line removed 11/24, blood cultures growing candida glabrata, infectious disease team was consulted, antibiotics broadened. TTE obtained on 11/24: mild concentric left ventricular hypertrophy, EF 65-70%, grade II left ventricular diastolic dysfunction with elevated filling pressure. Lactate uptrending, surveillance cultures sent. RUQ US: CBD only 5mm, contracted gallbladder, hepatic vessels patent; Bilateral upper extremity duplex: no evidence of DVT. Bile culture growing pseudomonas. Nephrology continued to follow patient, renal US on 12/1 negative for hydronephrosis, abdominal US negative for lesion and patent flow throughout. Bicarb gtt started on 12/1, labs trended. Bicarb gtt discontinued on 12/4 when improved. General surgery consulted for hyperbilirubinemia. +p-anca positive. PICC replaced on 12/4, TPN restarted. Repeat bedside US on 12/6 showing persistent bilateral pleural effusions, diuresed. Course significant for a fib w/ RVR, rate controlled accordingly. EBV positive on 12/8. Transfused 1U PRBC on 12/9 for hgb 7.5, repeat hgb 8.5. CMV negative. Continued to be IV fluid resuscitated as needed. Noted punctate skin bleed at wound on 12/18 requiring silver nitrate and surgicell. 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 Transfused 1U PRBC on 12/24 for hgb 7.3, repeat hgb improved. Holistic therapy nurse was consulted, followed throughout hospital stay. Transfused 1U PRBC for symptomatic anemia on 12/28, persistent punctuate wound bleeding, reinforced, held heparin. Attempted hemostasis of persistent bleed w/ suturing/cautery however unsuccessful. Hemostasis achieved w/ epi/saline gauze. Ileostomy reinforced, red rubber placed on 12/30. Perc cony tube capped on 1/2. TTE obtained on 1/4 showing: LVEF 75%, mild/moderate aortic regurg, PASP 40, RVEF wnl. Bedside ultrasound showing bilateral effusions. Bilateral lower extremity duplex obtained, no evidence of DVT. Right upper extremity PICC placed, left upper extremity removed on 1/5. Rate control medications continued to be adjusted accordingly, also started timolol eye drops for additional beta blocking effect. Started therapeutic lovenox on 1/10. Process started for approval of medication gattex on 1/10. Persistent ileostomy bleeding managed with manual pressure. Uptrending WBC, full workup completed, CXR showing left basilar opacity, likely atelectasis vs pneumonia, urinalysis negative, started on IV cefepime per infectious disease team. Perc cony recapped on 1/17, started on prophylactic lovenox. Cardiology and EP followed for symptomatic sinus pause of 10 seconds, likely vagal, restarted metoprolol and timolol. Lasix given for increasing bilateral pleural effusions seen on POCUS. Continued to be transfused as needed for bleeding/anemia. Given first dose of gattex on 1/24. Continued to be diuresed for edema/effusions.     At the time of discharge, patient was hemodynamically stable to be discharged to __ with ___.          ***LAST UPDATED 1/24/2024**   78M w/ Crohn's, AFib/Flutter s/p DCCVs, remote ileocectomy and open appy, presented to [facility] with abdominal pain, outpatient CT concerning for dilated loops of small bowel and stomach, admitted for resolving SBO vs Crohn's flare, placed a nasogastric tube for decompression, plan for nonoperative management with serial abdominal exams and close hemodynamic monitoring. Course significant for uncontrolled hypertension, medicine team followed throughout their hospital stay, antihypertensives given accordingly. Given no progression of bowel function, decision was made to take patient to the OR on 6/26 for laparoscopic lysis of adhesions, converted to open lysis of adhesions, SBR x 3 and left in discontinuity, temporary abdominal closure. Transferred to SICU post op for hemodynamic monitoring, remained intubated. Course complicated by right sided pneumothorax, thoracic surgery was consulted, placed chest tube on 6/27. Patient returned to the OR on 6/28 for ex lap, removal of abthera, ileocolic resection, small bowel anastomosis, abdominal wall closure. Post operative hemoglobin downtrended to 8.5 from 9, given 1U PRBC. Started on TPN for nutritional optimization and successfully extubated to high flow NC on 6/29. Respiratory status continued to be monitored closely with serial CXRs, chest PT. Course complicated by fevers, altered mental status, fever workup obtained, CXR showing left lung consolidation/effusion, urinalysis negative, blood cultures negative. OR cultures growing enterococcus, infectious disease team consulted, antibiotics adjusted. PICC line was placed on 6/30, no complications. Patient was diuresed as necessary with lasix for fluid overload. Nasogastric tube was removed on 7/1. Wound vac changed 3 times/wk. Uptrending leukocytosis, CT chest AP was obtained on 7/2 showing small bowel malrotation, early/partial bowel obstruction vs post op ileus, small bilateral pleural effusions w/ bibasilar atelectasis. Transfused 1U PRBC for hemoglobin 7.7, responded appropriately. Diet was advanced as tolerated and per return of bowel function. Chest tube was removed on 7/3, repeat CXR with significant improvement. Patient was taken by interventional radiology team for perihepatic aspiration of serous fluid on 7/3. Patient returned to the OR on 7/5 for wound dehiscence for exlap, washout, ileocolic resection with end ileostomy, blow hole colostomy, fistula, red rubber from ileostomy to small bowel anastomosis; vicryl bridging mesh; R JOYCE below ileostomy, L JOYCE at small bowel enterotomy repair. SICU for post op monitoring.  Abdominal fluid growing C. tertium/Lactobacillus, antibiotics adjusted per ID team. Course complicated by post op fever, OR cultures growing rare yeast, started fluconazole. Successfully extubated on 7/6. Worked with ostomy nurses. Uncontrolled HTN, medications adjusted accordingly, transitioned to cardene gtt. Bedside US showing small bilateral pleural effusions, continued to be diuresed as necessary. TPN restarted, NGT and red rubber removed on 7/10, diet advanced as tolerated, cleared by speech and swallow evaluation for cough. Continued wound vac changes. Slight uptrending WBC post op, surveillance blood cultures sent, urine lytes sent, consistent with intrinsic renal disease. Echo obtained showing mild LVH w/ EF 65-70%. Liver function tests uptrending, ultrasound of abdomen completed showing no evidence of portal or hepatic venous thrombosis, fluid/sludge distended GB, no cholelithiasis or cholecystitis. Fever workup completed on 7/22 for Tmax 101. CT head/chest/abdomen/pelvis was obtained on 7/23 showing persistent ECF, Bilateral pleural effusions L>R. No acute intracranial hemorrhage. Blood cxs/MRSA/staph negative. Fistula had increasing output, wound care followed patient, imodium started for high output. Course complicated by acute delirium, all potential causes discontinued, stepped up to ICU care for further management, PICC line removed on 8/23, started empiric IV antibiotics for potential line sepsis. CT head obtained on 8/23 - normal scan, no hemorrhage or infarcts. Infectious workup obtained, blood cultures negative. TSH elevated, given IV synthroid. ICU course significant for hypoglycemia, hyponatremia, fevers - workup continued to be negative; elevated liver function tests. RUQ US obtained 8/26 showing distended gallbladder with sludge, no wall thickening/pericholecystic fluid, notable for right pleural effusion. CTAP obtained showing increased small R pleural effusion w/ atelectasis, no pneumoperitoneum. Diet advanced as tolerated, PICC and blood cultures remained negative. Patient clinically improved, was stepped down to telemetry on 8/28. Imodium increased per high ostomy output. PICC line placed on 9/1, TPN started for nutritional optimization. Patient was stepped up to ICU for severe ELVI, treated accordingly, mccauley placed. Restarted octreotide and lomotil for persistent high ostomy output. Mccauley removed on 9/3. Stepped down to telemetry when medically stable. Diet advanced as tolerated. Nephrology team consulted, urine lytes confirms SIADH. Course complicated by altered mental status, neurology team consulted, workup obtained. CT chest AP on 9/10 showing mild pulmonary edema, atelectasis vs pneumonia, no bowel obstruction, distended gallbladder w/ sludge. EEG obtained on 9/11: continuous mild generalized background slowing suggestive of a similar degree of diffuse or multifocal dysfunction; No epileptiform activity and no significant clinical events occurred. RUQ US consistent with distended gallbladder and small sludge, no cholelithiasis or acute cholecystitis. Taken for percutaneous cholecystectomy on 9/11. Continue IVF resuscitation for high ostomy output. Switched wound vac to wet to dry dressing changes, patient tolerating well. 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 AXR showing delayed gastric emptying given contrast remains in stomach, started erythromycin for gastroparesis. Wound care team consulted, switched wet to dry dressings to wound manager, restarted imodium. Patient worked with holistic therapy. Erythromycin eventually decreased and tapered off. Lomotil/imodium restarted, perc cony study performed, perc cony capped on 10/4. Diet advanced, fistula output increasing, uptrending BUN/Cr, IVF resuscitation as necessary. Started on cholestyramine on 10/18. Monitored BUN/Cr closely, FeNa 0.7% prerenal, continued IVF resuscitation. Dr. Yuan (GI) consulted for Crohn's management. Course significant for uptrending direct bilirubin with downtrending LFTs. T-tube uncapped on 10/25, tube study showing patent cystic/common bile ducts, minimal emptying into duodenum. LFTs/bilirubin continued to be trended. Creatinine uptrending, urine lytes showing intrinsic pathology. 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 Blood/fluid cultures sent. Started on high protein diet. Repeat CTAP on 11/1 showing no changes. Right upper extremity duplex obtained on 11/1 and consistent with brachial DVT. PICC line was replaced. Lovenox started for treatment of DVT. Hepatology followed patient during hospital stay, started ursodiol. Patient was transfused 1U PRBC on 11/6 for acute anemia, low anti Xa level, post transfusion CBC stable, started epo weekly. Left JOYCE drain removed on 11/7. Repeat upper extremity duplex on 11/10 negative for DVT. Course significant for new blood tinged output in eakins pouch, occult test positive for blood. Fungal cultures negative. Bedside US obtained for new shortness of breath on 11/13, consistent with small left pleural effusion, no right sided pleural effusions, echo normal. CT PE protocol obtained for persistent shortness of breath, showed no central PE; areas of groundglass attenuation involving the bilateral lung fields most pronounced at the ALDEN concerning for atypical pneumonia, edema or hemorrhage; moderate right and small left pleural effusion with atelectatic changes - diuresed as necessary. Diet advanced to low fat. RVP/MRSA swab negative, staph PCR positive. Repeat US on 11/18 showed small effusion, non worsening. New blood tinged output via ostomy, transfused 1U PRBC for acute anemia, achieved hemostasis with silver nitrate/surgicel, repeat CBC stable. Fever workup obtained on 11/22, antibiotics adjusted, urine lytes consistent with prerenal disease. Course complicated by atrial fibrillation with RVR (HR 140s), lopressor for rate control, improved. Discontinued TPN on 11/23. Bile culture positive for gram positive cocci. PICC line removed 11/24, blood cultures growing candida glabrata, infectious disease team was consulted, antibiotics broadened. TTE obtained on 11/24: mild concentric left ventricular hypertrophy, EF 65-70%, grade II left ventricular diastolic dysfunction with elevated filling pressure. Lactate uptrending, surveillance cultures sent. RUQ US: CBD only 5mm, contracted gallbladder, hepatic vessels patent; Bilateral upper extremity duplex: no evidence of DVT. Bile culture growing pseudomonas. Nephrology continued to follow patient, renal US on 12/1 negative for hydronephrosis, abdominal US negative for lesion and patent flow throughout. Bicarb gtt started on 12/1, labs trended. Bicarb gtt discontinued on 12/4 when improved. General surgery consulted for hyperbilirubinemia. +p-anca positive. PICC replaced on 12/4, TPN restarted. Repeat bedside US on 12/6 showing persistent bilateral pleural effusions, diuresed. Course significant for a fib w/ RVR, rate controlled accordingly. EBV positive on 12/8. Transfused 1U PRBC on 12/9 for hgb 7.5, repeat hgb 8.5. CMV negative. Continued to be IV fluid resuscitated as needed. Noted punctate skin bleed at wound on 12/18 requiring silver nitrate and surgicell. 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 Transfused 1U PRBC on 12/24 for hgb 7.3, repeat hgb improved. Holistic therapy nurse was consulted, followed throughout hospital stay. Transfused 1U PRBC for symptomatic anemia on 12/28, persistent punctuate wound bleeding, reinforced, held heparin. Attempted hemostasis of persistent bleed w/ suturing/cautery however unsuccessful. Hemostasis achieved w/ epi/saline gauze. Ileostomy reinforced, red rubber placed on 12/30. Perc cony tube capped on 1/2. TTE obtained on 1/4 showing: LVEF 75%, mild/moderate aortic regurg, PASP 40, RVEF wnl. Bedside ultrasound showing bilateral effusions. Bilateral lower extremity duplex obtained, no evidence of DVT. Right upper extremity PICC placed, left upper extremity removed on 1/5. Rate control medications continued to be adjusted accordingly, also started timolol eye drops for additional beta blocking effect. Started therapeutic lovenox on 1/10. Process started for approval of medication gattex on 1/10. Persistent ileostomy bleeding managed with manual pressure. Uptrending WBC, full workup completed, CXR showing left basilar opacity, likely atelectasis vs pneumonia, urinalysis negative, started on IV cefepime per infectious disease team. Perc cony recapped on 1/17, started on prophylactic lovenox. Cardiology and EP followed for symptomatic sinus pause of 10 seconds, likely vagal, restarted metoprolol and timolol. Lasix given for increasing bilateral pleural effusions seen on POCUS. Continued to be transfused as needed for bleeding/anemia. Given first dose of gattex on 1/24. Continued to be diuresed for edema/effusions.     At the time of discharge, patient was hemodynamically stable to be discharged to __ with ___.          ***LAST UPDATED 1/24/2024**   78M w/ Crohn's, AFib/Flutter s/p DCCVs, remote ileocectomy and open appy, presented to [facility] with abdominal pain, outpatient CT concerning for dilated loops of small bowel and stomach, admitted for resolving SBO vs Crohn's flare, placed a nasogastric tube for decompression, plan for nonoperative management with serial abdominal exams and close hemodynamic monitoring. Course significant for uncontrolled hypertension, medicine team followed throughout their hospital stay, antihypertensives given accordingly. Given no progression of bowel function, decision was made to take patient to the OR on 6/26 for laparoscopic lysis of adhesions, converted to open lysis of adhesions, SBR x 3 and left in discontinuity, temporary abdominal closure. Transferred to SICU post op for hemodynamic monitoring, remained intubated. Course complicated by right sided pneumothorax, thoracic surgery was consulted, placed chest tube on 6/27. Patient returned to the OR on 6/28 for ex lap, removal of abthera, ileocolic resection, small bowel anastomosis, abdominal wall closure. Post operative hemoglobin downtrended to 8.5 from 9, given 1U PRBC. Started on TPN for nutritional optimization and successfully extubated to high flow NC on 6/29. Respiratory status continued to be monitored closely with serial CXRs, chest PT. Course complicated by fevers, altered mental status, fever workup obtained, CXR showing left lung consolidation/effusion, urinalysis negative, blood cultures negative. OR cultures growing enterococcus, infectious disease team consulted, antibiotics adjusted. PICC line was placed on 6/30, no complications. Patient was diuresed as necessary with lasix for fluid overload. Nasogastric tube was removed on 7/1. Wound vac changed 3 times/wk. Uptrending leukocytosis, CT chest AP was obtained on 7/2 showing small bowel malrotation, early/partial bowel obstruction vs post op ileus, small bilateral pleural effusions w/ bibasilar atelectasis. Transfused 1U PRBC for hemoglobin 7.7, responded appropriately. Diet was advanced as tolerated and per return of bowel function. Chest tube was removed on 7/3, repeat CXR with significant improvement. Patient was taken by interventional radiology team for perihepatic aspiration of serous fluid on 7/3. Patient returned to the OR on 7/5 for wound dehiscence for exlap, washout, ileocolic resection with end ileostomy, blow hole colostomy, fistula, red rubber from ileostomy to small bowel anastomosis; vicryl bridging mesh; R JOYCE below ileostomy, L JOYCE at small bowel enterotomy repair. SICU for post op monitoring.  Abdominal fluid growing C. tertium/Lactobacillus, antibiotics adjusted per ID team. Course complicated by post op fever, OR cultures growing rare yeast, started fluconazole. Successfully extubated on 7/6. Worked with ostomy nurses. Uncontrolled HTN, medications adjusted accordingly, transitioned to cardene gtt. Bedside US showing small bilateral pleural effusions, continued to be diuresed as necessary. TPN restarted, NGT and red rubber removed on 7/10, diet advanced as tolerated, cleared by speech and swallow evaluation for cough. Continued wound vac changes. Slight uptrending WBC post op, surveillance blood cultures sent, urine lytes sent, consistent with intrinsic renal disease. Echo obtained showing mild LVH w/ EF 65-70%. Liver function tests uptrending, ultrasound of abdomen completed showing no evidence of portal or hepatic venous thrombosis, fluid/sludge distended GB, no cholelithiasis or cholecystitis. Fever workup completed on 7/22 for Tmax 101. CT head/chest/abdomen/pelvis was obtained on 7/23 showing persistent ECF, Bilateral pleural effusions L>R. No acute intracranial hemorrhage. Blood cxs/MRSA/staph negative. Fistula had increasing output, wound care followed patient, imodium started for high output. Course complicated by acute delirium, all potential causes discontinued, stepped up to ICU care for further management, PICC line removed on 8/23, started empiric IV antibiotics for potential line sepsis. CT head obtained on 8/23 - normal scan, no hemorrhage or infarcts. Infectious workup obtained, blood cultures negative. TSH elevated, given IV synthroid. ICU course significant for hypoglycemia, hyponatremia, fevers - workup continued to be negative; elevated liver function tests. RUQ US obtained 8/26 showing distended gallbladder with sludge, no wall thickening/pericholecystic fluid, notable for right pleural effusion. CTAP obtained showing increased small R pleural effusion w/ atelectasis, no pneumoperitoneum. Diet advanced as tolerated, PICC and blood cultures remained negative. Patient clinically improved, was stepped down to telemetry on 8/28. Imodium increased per high ostomy output. PICC line placed on 9/1, TPN started for nutritional optimization. Patient was stepped up to ICU for severe ELVI, treated accordingly, mccauley placed. Restarted octreotide and lomotil for persistent high ostomy output. Mccauley removed on 9/3. Stepped down to telemetry when medically stable. Diet advanced as tolerated. Nephrology team consulted, urine lytes confirms SIADH. Course complicated by altered mental status, neurology team consulted, workup obtained. CT chest AP on 9/10 showing mild pulmonary edema, atelectasis vs pneumonia, no bowel obstruction, distended gallbladder w/ sludge. EEG obtained on 9/11: continuous mild generalized background slowing suggestive of a similar degree of diffuse or multifocal dysfunction; No epileptiform activity and no significant clinical events occurred. RUQ US consistent with distended gallbladder and small sludge, no cholelithiasis or acute cholecystitis. Taken for percutaneous cholecystectomy on 9/11. Continue IVF resuscitation for high ostomy output. Switched wound vac to wet to dry dressing changes, patient tolerating well. 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 AXR showing delayed gastric emptying given contrast remains in stomach, started erythromycin for gastroparesis. Wound care team consulted, switched wet to dry dressings to wound manager, restarted imodium. Patient worked with holistic therapy. Erythromycin eventually decreased and tapered off. Lomotil/imodium restarted, perc cony study performed, perc cony capped on 10/4. Diet advanced, fistula output increasing, uptrending BUN/Cr, IVF resuscitation as necessary. Started on cholestyramine on 10/18. Monitored BUN/Cr closely, FeNa 0.7% prerenal, continued IVF resuscitation. Dr. Yuan (GI) consulted for Crohn's management. Course significant for uptrending direct bilirubin with downtrending LFTs. T-tube uncapped on 10/25, tube study showing patent cystic/common bile ducts, minimal emptying into duodenum. LFTs/bilirubin continued to be trended. Creatinine uptrending, urine lytes showing intrinsic pathology. 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 Blood/fluid cultures sent. Started on high protein diet. Repeat CTAP on 11/1 showing no changes. Right upper extremity duplex obtained on 11/1 and consistent with brachial DVT. PICC line was replaced. Lovenox started for treatment of DVT. Hepatology followed patient during hospital stay, started ursodiol. Patient was transfused 1U PRBC on 11/6 for acute anemia, low anti Xa level, post transfusion CBC stable, started epo weekly. Left JOYCE drain removed on 11/7. Repeat upper extremity duplex on 11/10 negative for DVT. Course significant for new blood tinged output in eakins pouch, occult test positive for blood. Fungal cultures negative. Bedside US obtained for new shortness of breath on 11/13, consistent with small left pleural effusion, no right sided pleural effusions, echo normal. CT PE protocol obtained for persistent shortness of breath, showed no central PE; areas of groundglass attenuation involving the bilateral lung fields most pronounced at the AL[place] concerning for atypical pneumonia, edema or hemorrhage; moderate right and small left pleural effusion with atelectatic changes - diuresed as necessary. Diet advanced to low fat. RVP/MRSA swab negative, staph PCR positive. Repeat US on 11/18 showed small effusion, non worsening. New blood tinged output via ostomy, transfused 1U PRBC for acute anemia, achieved hemostasis with silver nitrate/surgicel, repeat CBC stable. Fever workup obtained on 11/22, antibiotics adjusted, urine lytes consistent with prerenal disease. Course complicated by atrial fibrillation with RVR (HR 140s), lopressor for rate control, improved. Discontinued TPN on 11/23. Bile culture positive for gram positive cocci. PICC line removed 11/24, blood cultures growing candida glabrata, infectious disease team was consulted, antibiotics broadened. TTE obtained on 11/24: mild concentric left ventricular hypertrophy, EF 65-70%, grade II left ventricular diastolic dysfunction with elevated filling pressure. Lactate uptrending, surveillance cultures sent. RUQ US: CBD only 5mm, contracted gallbladder, hepatic vessels patent; Bilateral upper extremity duplex: no evidence of DVT. Bile culture growing pseudomonas. Nephrology continued to follow patient, renal US on 12/1 negative for hydronephrosis, abdominal US negative for lesion and patent flow throughout. Bicarb gtt started on 12/1, labs trended. Bicarb gtt discontinued on 12/4 when improved. General surgery consulted for hyperbilirubinemia. +p-anca positive. PICC replaced on 12/4, TPN restarted. Repeat bedside US on 12/6 showing persistent bilateral pleural effusions, diuresed. Course significant for a fib w/ RVR, rate controlled accordingly. EBV positive on 12/8. Transfused 1U PRBC on 12/9 for hgb 7.5, repeat hgb 8.5. CMV negative. Continued to be IV fluid resuscitated as needed. Noted punctate skin bleed at wound on 12/18 requiring silver nitrate and surgicell. 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 Transfused 1U PRBC on 12/24 for hgb 7.3, repeat hgb improved. Holistic therapy nurse was consulted, followed throughout hospital stay. Transfused 1U PRBC for symptomatic anemia on 12/28, persistent punctuate wound bleeding, reinforced, held heparin. Attempted hemostasis of persistent bleed w/ suturing/cautery however unsuccessful. Hemostasis achieved w/ epi/saline gauze. Ileostomy reinforced, red rubber placed on 12/30. Perc cony tube capped on 1/2. TTE obtained on 1/4 showing: LVEF 75%, mild/moderate aortic regurg, PASP 40, RVEF wnl. Bedside ultrasound showing bilateral effusions. Bilateral lower extremity duplex obtained, no evidence of DVT. Right upper extremity PICC placed, left upper extremity removed on 1/5. Rate control medications continued to be adjusted accordingly, also started timolol eye drops for additional beta blocking effect. Started therapeutic lovenox on 1/10. Process started for approval of medication gattex on 1/10. Persistent ileostomy bleeding managed with manual pressure. Uptrending WBC, full workup completed, CXR showing left basilar opacity, likely atelectasis vs pneumonia, urinalysis negative, started on IV cefepime per infectious disease team. Perc cony recapped on 1/17, started on prophylactic lovenox. Cardiology and EP followed for symptomatic sinus pause of 10 seconds, likely vagal, restarted metoprolol and timolol. Lasix given for increasing bilateral pleural effusions seen on POCUS. Continued to be transfused as needed for bleeding/anemia. Given first dose of gattex on 1/24. Continued to be diuresed for edema/effusions.     At the time of discharge, patient was hemodynamically stable to be discharged to __ with ___.            ***LAST UPDATED 1/24/2024**   78M w/ Crohn's, AFib/Flutter s/p DCCVs, remote ileocectomy and open appy, presented to [facility] with abdominal pain, outpatient CT concerning for dilated loops of small bowel and stomach, admitted for resolving SBO vs Crohn's flare, placed a nasogastric tube for decompression, plan for nonoperative management with serial abdominal exams and close hemodynamic monitoring. Course significant for uncontrolled hypertension, medicine team followed throughout their hospital stay, antihypertensives given accordingly. Given no progression of bowel function, decision was made to take patient to the OR on 6/26 for laparoscopic lysis of adhesions, converted to open lysis of adhesions, SBR x 3 and left in discontinuity, temporary abdominal closure. Transferred to SICU post op for hemodynamic monitoring, remained intubated. Course complicated by right sided pneumothorax, thoracic surgery was consulted, placed chest tube on 6/27. Patient returned to the OR on 6/28 for ex lap, removal of abthera, ileocolic resection, small bowel anastomosis, abdominal wall closure. Post operative hemoglobin downtrended to 8.5 from 9, given 1U PRBC. Started on TPN for nutritional optimization and successfully extubated to high flow NC on 6/29. Respiratory status continued to be monitored closely with serial CXRs, chest PT. Course complicated by fevers, altered mental status, fever workup obtained, CXR showing left lung consolidation/effusion, urinalysis negative, blood cultures negative. OR cultures growing enterococcus, infectious disease team consulted, antibiotics adjusted. PICC line was placed on 6/30, no complications. Patient was diuresed as necessary with lasix for fluid overload. Nasogastric tube was removed on 7/1. Wound vac changed 3 times/wk. Uptrending leukocytosis, CT chest AP was obtained on 7/2 showing small bowel malrotation, early/partial bowel obstruction vs post op ileus, small bilateral pleural effusions w/ bibasilar atelectasis. Transfused 1U PRBC for hemoglobin 7.7, responded appropriately. Diet was advanced as tolerated and per return of bowel function. Chest tube was removed on 7/3, repeat CXR with significant improvement. Patient was taken by interventional radiology team for perihepatic aspiration of serous fluid on 7/3. Patient returned to the OR on 7/5 for wound dehiscence for exlap, washout, ileocolic resection with end ileostomy, blow hole colostomy, fistula, red rubber from ileostomy to small bowel anastomosis; vicryl bridging mesh; R JOYCE below ileostomy, L JOYCE at small bowel enterotomy repair. SICU for post op monitoring.  Abdominal fluid growing C. tertium/Lactobacillus, antibiotics adjusted per ID team. Course complicated by post op fever, OR cultures growing rare yeast, started fluconazole. Successfully extubated on 7/6. Worked with ostomy nurses. Uncontrolled HTN, medications adjusted accordingly, transitioned to cardene gtt. Bedside US showing small bilateral pleural effusions, continued to be diuresed as necessary. TPN restarted, NGT and red rubber removed on 7/10, diet advanced as tolerated, cleared by speech and swallow evaluation for cough. Continued wound vac changes. Slight uptrending WBC post op, surveillance blood cultures sent, urine lytes sent, consistent with intrinsic renal disease. Echo obtained showing mild LVH w/ EF 65-70%. Liver function tests uptrending, ultrasound of abdomen completed showing no evidence of portal or hepatic venous thrombosis, fluid/sludge distended GB, no cholelithiasis or cholecystitis. Fever workup completed on 7/22 for Tmax 101. CT head/chest/abdomen/pelvis was obtained on 7/23 showing persistent ECF, Bilateral pleural effusions L>R. No acute intracranial hemorrhage. Blood cxs/MRSA/staph negative. Fistula had increasing output, wound care followed patient, imodium started for high output. Course complicated by acute delirium, all potential causes discontinued, stepped up to ICU care for further management, PICC line removed on 8/23, started empiric IV antibiotics for potential line sepsis. CT head obtained on 8/23 - normal scan, no hemorrhage or infarcts. Infectious workup obtained, blood cultures negative. TSH elevated, given IV synthroid. ICU course significant for hypoglycemia, hyponatremia, fevers - workup continued to be negative; elevated liver function tests. RUQ US obtained 8/26 showing distended gallbladder with sludge, no wall thickening/pericholecystic fluid, notable for right pleural effusion. CTAP obtained showing increased small R pleural effusion w/ atelectasis, no pneumoperitoneum. Diet advanced as tolerated, PICC and blood cultures remained negative. Patient clinically improved, was stepped down to telemetry on 8/28. Imodium increased per high ostomy output. PICC line placed on 9/1, TPN started for nutritional optimization. Patient was stepped up to ICU for severe ELVI, treated accordingly, mccauley placed. Restarted octreotide and lomotil for persistent high ostomy output. Mccauley removed on 9/3. Stepped down to telemetry when medically stable. Diet advanced as tolerated. Nephrology team consulted, urine lytes confirms SIADH. Course complicated by altered mental status, neurology team consulted, workup obtained. CT chest AP on 9/10 showing mild pulmonary edema, atelectasis vs pneumonia, no bowel obstruction, distended gallbladder w/ sludge. EEG obtained on 9/11: continuous mild generalized background slowing suggestive of a similar degree of diffuse or multifocal dysfunction; No epileptiform activity and no significant clinical events occurred. RUQ US consistent with distended gallbladder and small sludge, no cholelithiasis or acute cholecystitis. Taken for percutaneous cholecystectomy on 9/11. Continue IVF resuscitation for high ostomy output. Switched wound vac to wet to dry dressing changes, patient tolerating well. 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 AXR showing delayed gastric emptying given contrast remains in stomach, started erythromycin for gastroparesis. Wound care team consulted, switched wet to dry dressings to wound manager, restarted imodium. Patient worked with holistic therapy. Erythromycin eventually decreased and tapered off. Lomotil/imodium restarted, perc cony study performed, perc cony capped on 10/4. Diet advanced, fistula output increasing, uptrending BUN/Cr, IVF resuscitation as necessary. Started on cholestyramine on 10/18. Monitored BUN/Cr closely, FeNa 0.7% prerenal, continued IVF resuscitation. Dr. Yuan (GI) consulted for Crohn's management. Course significant for uptrending direct bilirubin with downtrending LFTs. T-tube uncapped on 10/25, tube study showing patent cystic/common bile ducts, minimal emptying into duodenum. LFTs/bilirubin continued to be trended. Creatinine uptrending, urine lytes showing intrinsic pathology. 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 Blood/fluid cultures sent. Started on high protein diet. Repeat CTAP on 11/1 showing no changes. Right upper extremity duplex obtained on 11/1 and consistent with brachial DVT. PICC line was replaced. Lovenox started for treatment of DVT. Hepatology followed patient during hospital stay, started ursodiol. Patient was transfused 1U PRBC on 11/6 for acute anemia, low anti Xa level, post transfusion CBC stable, started epo weekly. Left JOYCE drain removed on 11/7. Repeat upper extremity duplex on 11/10 negative for DVT. Course significant for new blood tinged output in eakins pouch, occult test positive for blood. Fungal cultures negative. Bedside US obtained for new shortness of breath on 11/13, consistent with small left pleural effusion, no right sided pleural effusions, echo normal. CT PE protocol obtained for persistent shortness of breath, showed no central PE; areas of groundglass attenuation involving the bilateral lung fields most pronounced at the ALDEN concerning for atypical pneumonia, edema or hemorrhage; moderate right and small left pleural effusion with atelectatic changes - diuresed as necessary. Diet advanced to low fat. RVP/MRSA swab negative, staph PCR positive. Repeat US on 11/18 showed small effusion, non worsening. New blood tinged output via ostomy, transfused 1U PRBC for acute anemia, achieved hemostasis with silver nitrate/surgicel, repeat CBC stable. Fever workup obtained on 11/22, antibiotics adjusted, urine lytes consistent with prerenal disease. Course complicated by atrial fibrillation with RVR (HR 140s), lopressor for rate control, improved. Discontinued TPN on 11/23. Bile culture positive for gram positive cocci. PICC line removed 11/24, blood cultures growing candida glabrata, infectious disease team was consulted, antibiotics broadened. TTE obtained on 11/24: mild concentric left ventricular hypertrophy, EF 65-70%, grade II left ventricular diastolic dysfunction with elevated filling pressure. Lactate uptrending, surveillance cultures sent. RUQ US: CBD only 5mm, contracted gallbladder, hepatic vessels patent; Bilateral upper extremity duplex: no evidence of DVT. Bile culture growing pseudomonas. Nephrology continued to follow patient, renal US on 12/1 negative for hydronephrosis, abdominal US negative for lesion and patent flow throughout. Bicarb gtt started on 12/1, labs trended. Bicarb gtt discontinued on 12/4 when improved. General surgery consulted for hyperbilirubinemia. +p-anca positive. PICC replaced on 12/4, TPN restarted. Repeat bedside US on 12/6 showing persistent bilateral pleural effusions, diuresed. Course significant for a fib w/ RVR, rate controlled accordingly. EBV positive on 12/8. Transfused 1U PRBC on 12/9 for hgb 7.5, repeat hgb 8.5. CMV negative. Continued to be IV fluid resuscitated as needed. Noted punctate skin bleed at wound on 12/18 requiring silver nitrate and surgicell. 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 Transfused 1U PRBC on 12/24 for hgb 7.3, repeat hgb improved. Holistic therapy nurse was consulted, followed throughout hospital stay. Transfused 1U PRBC for symptomatic anemia on 12/28, persistent punctuate wound bleeding, reinforced, held heparin. Attempted hemostasis of persistent bleed w/ suturing/cautery however unsuccessful. Hemostasis achieved w/ epi/saline gauze. Ileostomy reinforced, red rubber placed on 12/30. Perc cony tube capped on 1/2. TTE obtained on 1/4 showing: LVEF 75%, mild/moderate aortic regurg, PASP 40, RVEF wnl. Bedside ultrasound showing bilateral effusions. Bilateral lower extremity duplex obtained, no evidence of DVT. Right upper extremity PICC placed, left upper extremity removed on 1/5. Rate control medications continued to be adjusted accordingly, also started timolol eye drops for additional beta blocking effect. Started therapeutic lovenox on 1/10. Process started for approval of medication gattex on 1/10. Persistent ileostomy bleeding managed with manual pressure. Uptrending WBC, full workup completed, CXR showing left basilar opacity, likely atelectasis vs pneumonia, urinalysis negative, started on IV cefepime per infectious disease team. Perc cony recapped on 1/17, started on prophylactic lovenox. Cardiology and EP followed for symptomatic sinus pause of 10 seconds, likely vagal, restarted metoprolol and timolol. Lasix given for increasing bilateral pleural effusions seen on POCUS. Continued to be transfused as needed for bleeding/anemia. Given first dose of gattex on 1/24. Continued to be diuresed for edema/effusions.     At the time of discharge, patient was hemodynamically stable to be discharged to __ with ___.            ***LAST UPDATED 1/24/2024**   78M w/ Crohn's, AFib/Flutter s/p DCCVs, remote ileocectomy and open appy, presented to [facility] with abdominal pain, outpatient CT concerning for dilated loops of small bowel and stomach, admitted for resolving SBO vs Crohn's flare, placed a nasogastric tube for decompression, plan for nonoperative management with serial abdominal exams and close hemodynamic monitoring. Course significant for uncontrolled hypertension, medicine team followed throughout their hospital stay, antihypertensives given accordingly. Given no progression of bowel function, decision was made to take patient to the OR on 6/26 for laparoscopic lysis of adhesions, converted to open lysis of adhesions, SBR x 3 and left in discontinuity, temporary abdominal closure. Transferred to SICU post op for hemodynamic monitoring, remained intubated. Course complicated by right sided pneumothorax, thoracic surgery was consulted, placed chest tube on 6/27. Patient returned to the OR on 6/28 for ex lap, removal of abthera, ileocolic resection, small bowel anastomosis, abdominal wall closure. Post operative hemoglobin downtrended to 8.5 from 9, given 1U PRBC. Started on TPN for nutritional optimization and successfully extubated to high flow NC on 6/29. Respiratory status continued to be monitored closely with serial CXRs, chest PT. Course complicated by fevers, altered mental status, fever workup obtained, CXR showing left lung consolidation/effusion, urinalysis negative, blood cultures negative. OR cultures growing enterococcus, infectious disease team consulted, antibiotics adjusted. PICC line was placed on 6/30, no complications. Patient was diuresed as necessary with lasix for fluid overload. Nasogastric tube was removed on 7/1. Wound vac changed 3 times/wk. Uptrending leukocytosis, CT chest AP was obtained on 7/2 showing small bowel malrotation, early/partial bowel obstruction vs post op ileus, small bilateral pleural effusions w/ bibasilar atelectasis. Transfused 1U PRBC for hemoglobin 7.7, responded appropriately. Diet was advanced as tolerated and per return of bowel function. Chest tube was removed on 7/3, repeat CXR with significant improvement. Patient was taken by interventional radiology team for perihepatic aspiration of serous fluid on 7/3. Patient returned to the OR on 7/5 for wound dehiscence for exlap, washout, ileocolic resection with end ileostomy, blow hole colostomy, fistula, red rubber from ileostomy to small bowel anastomosis; vicryl bridging mesh; R JOYCE below ileostomy, L JOYCE at small bowel enterotomy repair. SICU for post op monitoring.  Abdominal fluid growing C. tertium/Lactobacillus, antibiotics adjusted per ID team. Course complicated by post op fever, OR cultures growing rare yeast, started fluconazole. Successfully extubated on 7/6. Worked with ostomy nurses. Uncontrolled HTN, medications adjusted accordingly, transitioned to cardene gtt. Bedside US showing small bilateral pleural effusions, continued to be diuresed as necessary. TPN restarted, NGT and red rubber removed on 7/10, diet advanced as tolerated, cleared by speech and swallow evaluation for cough. Continued wound vac changes. Slight uptrending WBC post op, surveillance blood cultures sent, urine lytes sent, consistent with intrinsic renal disease. Echo obtained showing mild LVH w/ EF 65-70%. Liver function tests uptrending, ultrasound of abdomen completed showing no evidence of portal or hepatic venous thrombosis, fluid/sludge distended GB, no cholelithiasis or cholecystitis. Fever workup completed on 7/22 for Tmax 101. CT head/chest/abdomen/pelvis was obtained on 7/23 showing persistent ECF, Bilateral pleural effusions L>R. No acute intracranial hemorrhage. Blood cxs/MRSA/staph negative. Fistula had increasing output, wound care followed patient, imodium started for high output. Course complicated by acute delirium, all potential causes discontinued, stepped up to ICU care for further management, PICC line removed on 8/23, started empiric IV antibiotics for potential line sepsis. CT head obtained on 8/23 - normal scan, no hemorrhage or infarcts. Infectious workup obtained, blood cultures negative. TSH elevated, given IV synthroid. ICU course significant for hypoglycemia, hyponatremia, fevers - workup continued to be negative; elevated liver function tests. RUQ US obtained 8/26 showing distended gallbladder with sludge, no wall thickening/pericholecystic fluid, notable for right pleural effusion. CTAP obtained showing increased small R pleural effusion w/ atelectasis, no pneumoperitoneum. Diet advanced as tolerated, PICC and blood cultures remained negative. Patient clinically improved, was stepped down to telemetry on 8/28. Imodium increased per high ostomy output. PICC line placed on 9/1, TPN started for nutritional optimization. Patient was stepped up to ICU for severe ELVI, treated accordingly, mccauley placed. Restarted octreotide and lomotil for persistent high ostomy output. Mccauley removed on 9/3. Stepped down to telemetry when medically stable. Diet advanced as tolerated. Nephrology team consulted, urine lytes confirms SIADH. Course complicated by altered mental status, neurology team consulted, workup obtained. CT chest AP on 9/10 showing mild pulmonary edema, atelectasis vs pneumonia, no bowel obstruction, distended gallbladder w/ sludge. EEG obtained on 9/11: continuous mild generalized background slowing suggestive of a similar degree of diffuse or multifocal dysfunction; No epileptiform activity and no significant clinical events occurred. RUQ US consistent with distended gallbladder and small sludge, no cholelithiasis or acute cholecystitis. Taken for percutaneous cholecystectomy on 9/11. Continue IVF resuscitation for high ostomy output. Switched wound vac to wet to dry dressing changes, patient tolerating well. 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 AXR showing delayed gastric emptying given contrast remains in stomach, started erythromycin for gastroparesis. Wound care team consulted, switched wet to dry dressings to wound manager, restarted imodium. Patient worked with holistic therapy. Erythromycin eventually decreased and tapered off. Lomotil/imodium restarted, perc cony study performed, perc cony capped on 10/4. Diet advanced, fistula output increasing, uptrending BUN/Cr, IVF resuscitation as necessary. Started on cholestyramine on 10/18. Monitored BUN/Cr closely, FeNa 0.7% prerenal, continued IVF resuscitation. Dr. Yuan (GI) consulted for Crohn's management. Course significant for uptrending direct bilirubin with downtrending LFTs. T-tube uncapped on 10/25, tube study showing patent cystic/common bile ducts, minimal emptying into duodenum. LFTs/bilirubin continued to be trended. Creatinine uptrending, urine lytes showing intrinsic pathology. 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 Blood/fluid cultures sent. Started on high protein diet. Repeat CTAP on 11/1 showing no changes. Right upper extremity duplex obtained on 11/1 and consistent with brachial DVT. PICC line was replaced. Lovenox started for treatment of DVT. Hepatology followed patient during hospital stay, started ursodiol. Patient was transfused 1U PRBC on 11/6 for acute anemia, low anti Xa level, post transfusion CBC stable, started epo weekly. Left JOYCE drain removed on 11/7. Repeat upper extremity duplex on 11/10 negative for DVT. Course significant for new blood tinged output in eakins pouch, occult test positive for blood. Fungal cultures negative. Bedside US obtained for new shortness of breath on 11/13, consistent with small left pleural effusion, no right sided pleural effusions, echo normal. CT PE protocol obtained for persistent shortness of breath, showed no central PE; areas of groundglass attenuation involving the bilateral lung fields most pronounced at the AL[place] concerning for atypical pneumonia, edema or hemorrhage; moderate right and small left pleural effusion with atelectatic changes - diuresed as necessary. Diet advanced to low fat. RVP/MRSA swab negative, staph PCR positive. Repeat US on 11/18 showed small effusion, non worsening. New blood tinged output via ostomy, transfused 1U PRBC for acute anemia, achieved hemostasis with silver nitrate/surgicel, repeat CBC stable. Fever workup obtained on 11/22, antibiotics adjusted, urine lytes consistent with prerenal disease. Course complicated by atrial fibrillation with RVR (HR 140s), lopressor for rate control, improved. Discontinued TPN on 11/23. Bile culture positive for gram positive cocci. PICC line removed 11/24, blood cultures growing candida glabrata, infectious disease team was consulted, antibiotics broadened. TTE obtained on 11/24: mild concentric left ventricular hypertrophy, EF 65-70%, grade II left ventricular diastolic dysfunction with elevated filling pressure. Lactate uptrending, surveillance cultures sent. RUQ US: CBD only 5mm, contracted gallbladder, hepatic vessels patent; Bilateral upper extremity duplex: no evidence of DVT. Bile culture growing pseudomonas. Nephrology continued to follow patient, renal US on 12/1 negative for hydronephrosis, abdominal US negative for lesion and patent flow throughout. Bicarb gtt started on 12/1, labs trended. Bicarb gtt discontinued on 12/4 when improved. General surgery consulted for hyperbilirubinemia. +p-anca positive. PICC replaced on 12/4, TPN restarted. Repeat bedside US on 12/6 showing persistent bilateral pleural effusions, diuresed. Course significant for a fib w/ RVR, rate controlled accordingly. EBV positive on 12/8. Transfused 1U PRBC on 12/9 for hgb 7.5, repeat hgb 8.5. CMV negative. Continued to be IV fluid resuscitated as needed. Noted punctate skin bleed at wound on 12/18 requiring silver nitrate and surgicell. 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 Transfused 1U PRBC on 12/24 for hgb 7.3, repeat hgb improved. Holistic therapy nurse was consulted, followed throughout hospital stay. Transfused 1U PRBC for symptomatic anemia on 12/28, persistent punctuate wound bleeding, reinforced, held heparin. Attempted hemostasis of persistent bleed w/ suturing/cautery however unsuccessful. Hemostasis achieved w/ epi/saline gauze. Ileostomy reinforced, red rubber placed on 12/30. Perc cony tube capped on 1/2. TTE obtained on 1/4 showing: LVEF 75%, mild/moderate aortic regurg, PASP 40, RVEF wnl. Bedside ultrasound showing bilateral effusions. Bilateral lower extremity duplex obtained, no evidence of DVT. Right upper extremity PICC placed, left upper extremity removed on 1/5. Rate control medications continued to be adjusted accordingly, also started timolol eye drops for additional beta blocking effect. Started therapeutic lovenox on 1/10. Process started for approval of medication gattex on 1/10. Persistent ileostomy bleeding managed with manual pressure. Uptrending WBC, full workup completed, CXR showing left basilar opacity, likely atelectasis vs pneumonia, urinalysis negative, started on IV cefepime per infectious disease team. Perc cony recapped on 1/17, started on prophylactic lovenox. Cardiology and EP followed for symptomatic sinus pause of 10 seconds, likely vagal, restarted metoprolol and timolol. Lasix given for increasing bilateral pleural effusions seen on POCUS. Continued to be transfused as needed for bleeding/anemia. Given first dose of gattex on 1/24. Continued to be diuresed for edema/effusions. Overnight Ostomy oozy but self-resolved. On 1/25 40 lasix was given and 250 of Diamox for volume overload. On 1/26 was given 1u pRBCs for Hgb of 7.3 lasix 40 x 2 and diamox 250 x 2. On 2/1 the BiPAP was dc'ed. On 2/2 Bleeding at wound edge: silver nitrate and epinephrine soaked surgicel placed with improvement. Overnight he had bleeding from the wound bed and Eakins pouch was taken down, large clot visualized bleeder. Resolved with mannual pressure and surgicel. On 2/3 1u pRBCs was given. On 2/4 he had a 20 second run of asystole and became transiently disoriented and felt "off". Electrophysiology was reconsulted.       2/12: 1uprbc per hobar. TPN ordered for outpatient.   ON: Net negative 500 LR bolus given. 1uPRBC per primary team.   2/11: hb 8.4(7.5), TPN reordered. 500LR bolus for net neg.   ON: 1L bolus for I/O negative 1.5L, DC summary updated to have Face to face.   2/10: Blood mixed with ostomy output, bleeder found, controlled with silver nitrate and hemostat. SBP 88, CBC 7.5 (8.2). Ordered 1u PRBC  ON: Small slow bleed from ostomy site. Stopped on its own. Slightly nauseas. Given zofran 4mg x1.   2/9: Timolol gtt stopped. TSH up to 5.25 - incr synthroid to 50 per Horbar, he will repeat TSH T4 in a few days as outpt.   ON: AMRITA  2/8: 1U PRBC for hgb 7.5, punctate bleeding again, resolved by holding pressure.   ON: No issues. AM Hgb 7.5.  2/7: punctate bleed to fistula wound bed, stopped w/ silver nitrate. new PICC placed, old picc removed. Tube study done - cystic duct patent, perc cony tube d/c'd.   2/6: IR will do perc cony tube study eval for cystic duct tomorrow. if cystic duct open, will remove perc cony, if not, will do tube exchange.   2/5: EP pacemaker placed without issues. Called TPN pharmacy and [facility]--no MDs in house who write TPN, usually outside MD write TPN order. Stopcock pulled.   ON: Small pinpoint bleeding at inferior aspect of wound - self resolved; feels well, some stool in appliance. preopped for possible pacemaker  2/4: TPN reordered. 20 second run of asystole, became transiently disoriented and felt "off". EP reconsulted - will assess tomorrow for PM, made NPO.                         At the time of discharge, patient was hemodynamically stable to be discharged to __ with ___.            ***LAST UPDATED 1/24/2024**   78M w/ Crohn's, AFib/Flutter s/p DCCVs, remote ileocectomy and open appy, presented to [facility] with abdominal pain, outpatient CT concerning for dilated loops of small bowel and stomach, admitted for resolving SBO vs Crohn's flare, placed a nasogastric tube for decompression, plan for nonoperative management with serial abdominal exams and close hemodynamic monitoring. Course significant for uncontrolled hypertension, medicine team followed throughout their hospital stay, antihypertensives given accordingly. Given no progression of bowel function, decision was made to take patient to the OR on 6/26 for laparoscopic lysis of adhesions, converted to open lysis of adhesions, SBR x 3 and left in discontinuity, temporary abdominal closure. Transferred to SICU post op for hemodynamic monitoring, remained intubated. Course complicated by right sided pneumothorax, thoracic surgery was consulted, placed chest tube on 6/27. Patient returned to the OR on 6/28 for ex lap, removal of abthera, ileocolic resection, small bowel anastomosis, abdominal wall closure. Post operative hemoglobin downtrended to 8.5 from 9, given 1U PRBC. Started on TPN for nutritional optimization and successfully extubated to high flow NC on 6/29. Respiratory status continued to be monitored closely with serial CXRs, chest PT. Course complicated by fevers, altered mental status, fever workup obtained, CXR showing left lung consolidation/effusion, urinalysis negative, blood cultures negative. OR cultures growing enterococcus, infectious disease team consulted, antibiotics adjusted. PICC line was placed on 6/30, no complications. Patient was diuresed as necessary with lasix for fluid overload. Nasogastric tube was removed on 7/1. Wound vac changed 3 times/wk. Uptrending leukocytosis, CT chest AP was obtained on 7/2 showing small bowel malrotation, early/partial bowel obstruction vs post op ileus, small bilateral pleural effusions w/ bibasilar atelectasis. Transfused 1U PRBC for hemoglobin 7.7, responded appropriately. Diet was advanced as tolerated and per return of bowel function. Chest tube was removed on 7/3, repeat CXR with significant improvement. Patient was taken by interventional radiology team for perihepatic aspiration of serous fluid on 7/3. Patient returned to the OR on 7/5 for wound dehiscence for exlap, washout, ileocolic resection with end ileostomy, blow hole colostomy, fistula, red rubber from ileostomy to small bowel anastomosis; vicryl bridging mesh; R JOYCE below ileostomy, L JOYCE at small bowel enterotomy repair. SICU for post op monitoring.  Abdominal fluid growing C. tertium/Lactobacillus, antibiotics adjusted per ID team. Course complicated by post op fever, OR cultures growing rare yeast, started fluconazole. Successfully extubated on 7/6. Worked with ostomy nurses. Uncontrolled HTN, medications adjusted accordingly, transitioned to cardene gtt. Bedside US showing small bilateral pleural effusions, continued to be diuresed as necessary. TPN restarted, NGT and red rubber removed on 7/10, diet advanced as tolerated, cleared by speech and swallow evaluation for cough. Continued wound vac changes. Slight uptrending WBC post op, surveillance blood cultures sent, urine lytes sent, consistent with intrinsic renal disease. Echo obtained showing mild LVH w/ EF 65-70%. Liver function tests uptrending, ultrasound of abdomen completed showing no evidence of portal or hepatic venous thrombosis, fluid/sludge distended GB, no cholelithiasis or cholecystitis. Fever workup completed on 7/22 for Tmax 101. CT head/chest/abdomen/pelvis was obtained on 7/23 showing persistent ECF, Bilateral pleural effusions L>R. No acute intracranial hemorrhage. Blood cxs/MRSA/staph negative. Fistula had increasing output, wound care followed patient, imodium started for high output. Course complicated by acute delirium, all potential causes discontinued, stepped up to ICU care for further management, PICC line removed on 8/23, started empiric IV antibiotics for potential line sepsis. CT head obtained on 8/23 - normal scan, no hemorrhage or infarcts. Infectious workup obtained, blood cultures negative. TSH elevated, given IV synthroid. ICU course significant for hypoglycemia, hyponatremia, fevers - workup continued to be negative; elevated liver function tests. RUQ US obtained 8/26 showing distended gallbladder with sludge, no wall thickening/pericholecystic fluid, notable for right pleural effusion. CTAP obtained showing increased small R pleural effusion w/ atelectasis, no pneumoperitoneum. Diet advanced as tolerated, PICC and blood cultures remained negative. Patient clinically improved, was stepped down to telemetry on 8/28. Imodium increased per high ostomy output. PICC line placed on 9/1, TPN started for nutritional optimization. Patient was stepped up to ICU for severe ELVI, treated accordingly, mccauley placed. Restarted octreotide and lomotil for persistent high ostomy output. Mccauley removed on 9/3. Stepped down to telemetry when medically stable. Diet advanced as tolerated. Nephrology team consulted, urine lytes confirms SIADH. Course complicated by altered mental status, neurology team consulted, workup obtained. CT chest AP on 9/10 showing mild pulmonary edema, atelectasis vs pneumonia, no bowel obstruction, distended gallbladder w/ sludge. EEG obtained on 9/11: continuous mild generalized background slowing suggestive of a similar degree of diffuse or multifocal dysfunction; No epileptiform activity and no significant clinical events occurred. RUQ US consistent with distended gallbladder and small sludge, no cholelithiasis or acute cholecystitis. Taken for percutaneous cholecystectomy on 9/11. Continue IVF resuscitation for high ostomy output. Switched wound vac to wet to dry dressing changes, patient tolerating well. 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 AXR showing delayed gastric emptying given contrast remains in stomach, started erythromycin for gastroparesis. Wound care team consulted, switched wet to dry dressings to wound manager, restarted imodium. Patient worked with holistic therapy. Erythromycin eventually decreased and tapered off. Lomotil/imodium restarted, perc cony study performed, perc cony capped on 10/4. Diet advanced, fistula output increasing, uptrending BUN/Cr, IVF resuscitation as necessary. Started on cholestyramine on 10/18. Monitored BUN/Cr closely, FeNa 0.7% prerenal, continued IVF resuscitation. Dr. Yuan (GI) consulted for Crohn's management. Course significant for uptrending direct bilirubin with downtrending LFTs. T-tube uncapped on 10/25, tube study showing patent cystic/common bile ducts, minimal emptying into duodenum. LFTs/bilirubin continued to be trended. Creatinine uptrending, urine lytes showing intrinsic pathology. 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 Blood/fluid cultures sent. Started on high protein diet. Repeat CTAP on 11/1 showing no changes. Right upper extremity duplex obtained on 11/1 and consistent with brachial DVT. PICC line was replaced. Lovenox started for treatment of DVT. Hepatology followed patient during hospital stay, started ursodiol. Patient was transfused 1U PRBC on 11/6 for acute anemia, low anti Xa level, post transfusion CBC stable, started epo weekly. Left JOYCE drain removed on 11/7. Repeat upper extremity duplex on 11/10 negative for DVT. Course significant for new blood tinged output in eakins pouch, occult test positive for blood. Fungal cultures negative. Bedside US obtained for new shortness of breath on 11/13, consistent with small left pleural effusion, no right sided pleural effusions, echo normal. CT PE protocol obtained for persistent shortness of breath, showed no central PE; areas of groundglass attenuation involving the bilateral lung fields most pronounced at the AL[place] concerning for atypical pneumonia, edema or hemorrhage; moderate right and small left pleural effusion with atelectatic changes - diuresed as necessary. Diet advanced to low fat. RVP/MRSA swab negative, staph PCR positive. Repeat US on 11/18 showed small effusion, non worsening. New blood tinged output via ostomy, transfused 1U PRBC for acute anemia, achieved hemostasis with silver nitrate/surgicel, repeat CBC stable. Fever workup obtained on 11/22, antibiotics adjusted, urine lytes consistent with prerenal disease. Course complicated by atrial fibrillation with RVR (HR 140s), lopressor for rate control, improved. Discontinued TPN on 11/23. Bile culture positive for gram positive cocci. PICC line removed 11/24, blood cultures growing candida glabrata, infectious disease team was consulted, antibiotics broadened. TTE obtained on 11/24: mild concentric left ventricular hypertrophy, EF 65-70%, grade II left ventricular diastolic dysfunction with elevated filling pressure. Lactate uptrending, surveillance cultures sent. RUQ US: CBD only 5mm, contracted gallbladder, hepatic vessels patent; Bilateral upper extremity duplex: no evidence of DVT. Bile culture growing pseudomonas. Nephrology continued to follow patient, renal US on 12/1 negative for hydronephrosis, abdominal US negative for lesion and patent flow throughout. Bicarb gtt started on 12/1, labs trended. Bicarb gtt discontinued on 12/4 when improved. General surgery consulted for hyperbilirubinemia. +p-anca positive. PICC replaced on 12/4, TPN restarted. Repeat bedside US on 12/6 showing persistent bilateral pleural effusions, diuresed. Course significant for a fib w/ RVR, rate controlled accordingly. EBV positive on 12/8. Transfused 1U PRBC on 12/9 for hgb 7.5, repeat hgb 8.5. CMV negative. Continued to be IV fluid resuscitated as needed. Noted punctate skin bleed at wound on 12/18 requiring silver nitrate and surgicell. 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 Transfused 1U PRBC on 12/24 for hgb 7.3, repeat hgb improved. Holistic therapy nurse was consulted, followed throughout hospital stay. Transfused 1U PRBC for symptomatic anemia on 12/28, persistent punctuate wound bleeding, reinforced, held heparin. Attempted hemostasis of persistent bleed w/ suturing/cautery however unsuccessful. Hemostasis achieved w/ epi/saline gauze. Ileostomy reinforced, red rubber placed on 12/30. Perc cony tube capped on 1/2. TTE obtained on 1/4 showing: LVEF 75%, mild/moderate aortic regurg, PASP 40, RVEF wnl. Bedside ultrasound showing bilateral effusions. Bilateral lower extremity duplex obtained, no evidence of DVT. Right upper extremity PICC placed, left upper extremity removed on 1/5. Rate control medications continued to be adjusted accordingly, also started timolol eye drops for additional beta blocking effect. Started therapeutic lovenox on 1/10. Process started for approval of medication gattex on 1/10. Persistent ileostomy bleeding managed with manual pressure. Uptrending WBC, full workup completed, CXR showing left basilar opacity, likely atelectasis vs pneumonia, urinalysis negative, started on IV cefepime per infectious disease team. Perc cony recapped on 1/17, started on prophylactic lovenox. Cardiology and EP followed for symptomatic sinus pause of 10 seconds, likely vagal, restarted metoprolol and timolol. Lasix given for increasing bilateral pleural effusions seen on POCUS. Continued to be transfused as needed for bleeding/anemia. Given first dose of gattex on 1/24. Continued to be diuresed for edema/effusions. Overnight Ostomy oozy but self-resolved. On 1/25 40 lasix was given and 250 of Diamox for volume overload. On 1/26 was given 1u pRBCs for Hgb of 7.3 lasix 40 x 2 and diamox 250 x 2. On 2/1 the BiPAP was dc'ed. On 2/2 Bleeding at wound edge: silver nitrate and epinephrine soaked surgicel placed with improvement. Overnight he had bleeding from the wound bed and Eakins pouch was taken down, large clot visualized bleeder. Resolved with mannual pressure and surgicel. On 2/3 1u pRBCs was given. On 2/4 he had a 20 second run of asystole and became transiently disoriented and felt "off". Electrophysiology was reconsulted.       2/12: 1uprbc per hobar. TPN ordered for outpatient.   ON: Net negative 500 LR bolus given. 1uPRBC per primary team.   2/11: hb 8.4(7.5), TPN reordered. 500LR bolus for net neg.   ON: 1L bolus for I/O negative 1.5L, DC summary updated to have Face to face.   2/10: Blood mixed with ostomy output, bleeder found, controlled with silver nitrate and hemostat. SBP 88, CBC 7.5 (8.2). Ordered 1u PRBC  ON: Small slow bleed from ostomy site. Stopped on its own. Slightly nauseas. Given zofran 4mg x1.   2/9: Timolol gtt stopped. TSH up to 5.25 - incr synthroid to 50 per Horbar, he will repeat TSH T4 in a few days as outpt.   ON: AMRITA  2/8: 1U PRBC for hgb 7.5, punctate bleeding again, resolved by holding pressure.   ON: No issues. AM Hgb 7.5.  2/7: punctate bleed to fistula wound bed, stopped w/ silver nitrate. new PICC placed, old picc removed. Tube study done - cystic duct patent, perc cony tube d/c'd.   2/6: IR will do perc cony tube study eval for cystic duct tomorrow. if cystic duct open, will remove perc cony, if not, will do tube exchange.   2/5: EP pacemaker placed without issues. Called TPN pharmacy and [facility]--no MDs in house who write TPN, usually outside MD write TPN order. Stopcock pulled.   ON: Small pinpoint bleeding at inferior aspect of wound - self resolved; feels well, some stool in appliance. preopped for possible pacemaker  2/4: TPN reordered. 20 second run of asystole, became transiently disoriented and felt "off". EP reconsulted - will assess tomorrow for PM, made NPO.                         At the time of discharge, patient was hemodynamically stable to be discharged to __ with ___.            ***LAST UPDATED 1/24/2024**   78M w/ Crohn's, AFib/Flutter s/p DCCVs, remote ileocectomy and open appy, presented to [facility] with abdominal pain, outpatient CT concerning for dilated loops of small bowel and stomach, admitted for resolving SBO vs Crohn's flare, placed a nasogastric tube for decompression, plan for nonoperative management with serial abdominal exams and close hemodynamic monitoring. Course significant for uncontrolled hypertension, medicine team followed throughout their hospital stay, antihypertensives given accordingly. Given no progression of bowel function, decision was made to take patient to the OR on 6/26 for laparoscopic lysis of adhesions, converted to open lysis of adhesions, SBR x 3 and left in discontinuity, temporary abdominal closure. Transferred to SICU post op for hemodynamic monitoring, remained intubated. Course complicated by right sided pneumothorax, thoracic surgery was consulted, placed chest tube on 6/27. Patient returned to the OR on 6/28 for ex lap, removal of abthera, ileocolic resection, small bowel anastomosis, abdominal wall closure. Post operative hemoglobin downtrended to 8.5 from 9, given 1U PRBC. Started on TPN for nutritional optimization and successfully extubated to high flow NC on 6/29. Respiratory status continued to be monitored closely with serial CXRs, chest PT. Course complicated by fevers, altered mental status, fever workup obtained, CXR showing left lung consolidation/effusion, urinalysis negative, blood cultures negative. OR cultures growing enterococcus, infectious disease team consulted, antibiotics adjusted. PICC line was placed on 6/30, no complications. Patient was diuresed as necessary with lasix for fluid overload. Nasogastric tube was removed on 7/1. Wound vac changed 3 times/wk. Uptrending leukocytosis, CT chest AP was obtained on 7/2 showing small bowel malrotation, early/partial bowel obstruction vs post op ileus, small bilateral pleural effusions w/ bibasilar atelectasis. Transfused 1U PRBC for hemoglobin 7.7, responded appropriately. Diet was advanced as tolerated and per return of bowel function. Chest tube was removed on 7/3, repeat CXR with significant improvement. Patient was taken by interventional radiology team for perihepatic aspiration of serous fluid on 7/3. Patient returned to the OR on 7/5 for wound dehiscence for exlap, washout, ileocolic resection with end ileostomy, blow hole colostomy, fistula, red rubber from ileostomy to small bowel anastomosis; vicryl bridging mesh; R JOYCE below ileostomy, L JOYCE at small bowel enterotomy repair. SICU for post op monitoring.  Abdominal fluid growing C. tertium/Lactobacillus, antibiotics adjusted per ID team. Course complicated by post op fever, OR cultures growing rare yeast, started fluconazole. Successfully extubated on 7/6. Worked with ostomy nurses. Uncontrolled HTN, medications adjusted accordingly, transitioned to cardene gtt. Bedside US showing small bilateral pleural effusions, continued to be diuresed as necessary. TPN restarted, NGT and red rubber removed on 7/10, diet advanced as tolerated, cleared by speech and swallow evaluation for cough. Continued wound vac changes. Slight uptrending WBC post op, surveillance blood cultures sent, urine lytes sent, consistent with intrinsic renal disease. Echo obtained showing mild LVH w/ EF 65-70%. Liver function tests uptrending, ultrasound of abdomen completed showing no evidence of portal or hepatic venous thrombosis, fluid/sludge distended GB, no cholelithiasis or cholecystitis. Fever workup completed on 7/22 for Tmax 101. CT head/chest/abdomen/pelvis was obtained on 7/23 showing persistent ECF, Bilateral pleural effusions L>R. No acute intracranial hemorrhage. Blood cxs/MRSA/staph negative. Fistula had increasing output, wound care followed patient, imodium started for high output. Course complicated by acute delirium, all potential causes discontinued, stepped up to ICU care for further management, PICC line removed on 8/23, started empiric IV antibiotics for potential line sepsis. CT head obtained on 8/23 - normal scan, no hemorrhage or infarcts. Infectious workup obtained, blood cultures negative. TSH elevated, given IV synthroid. ICU course significant for hypoglycemia, hyponatremia, fevers - workup continued to be negative; elevated liver function tests. RUQ US obtained 8/26 showing distended gallbladder with sludge, no wall thickening/pericholecystic fluid, notable for right pleural effusion. CTAP obtained showing increased small R pleural effusion w/ atelectasis, no pneumoperitoneum. Diet advanced as tolerated, PICC and blood cultures remained negative. Patient clinically improved, was stepped down to telemetry on 8/28. Imodium increased per high ostomy output. PICC line placed on 9/1, TPN started for nutritional optimization. Patient was stepped up to ICU for severe ELVI, treated accordingly, mccauley placed. Restarted octreotide and lomotil for persistent high ostomy output. Mccauley removed on 9/3. Stepped down to telemetry when medically stable. Diet advanced as tolerated. Nephrology team consulted, urine lytes confirms SIADH. Course complicated by altered mental status, neurology team consulted, workup obtained. CT chest AP on 9/10 showing mild pulmonary edema, atelectasis vs pneumonia, no bowel obstruction, distended gallbladder w/ sludge. EEG obtained on 9/11: continuous mild generalized background slowing suggestive of a similar degree of diffuse or multifocal dysfunction; No epileptiform activity and no significant clinical events occurred. RUQ US consistent with distended gallbladder and small sludge, no cholelithiasis or acute cholecystitis. Taken for percutaneous cholecystectomy on 9/11. Continue IVF resuscitation for high ostomy output. Switched wound vac to wet to dry dressing changes, patient tolerating well. 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 AXR showing delayed gastric emptying given contrast remains in stomach, started erythromycin for gastroparesis. Wound care team consulted, switched wet to dry dressings to wound manager, restarted imodium. Patient worked with holistic therapy. Erythromycin eventually decreased and tapered off. Lomotil/imodium restarted, perc ocny study performed, perc cony capped on 10/4. Diet advanced, fistula output increasing, uptrending BUN/Cr, IVF resuscitation as necessary. Started on cholestyramine on 10/18. Monitored BUN/Cr closely, FeNa 0.7% prerenal, continued IVF resuscitation. Dr. Yuan (GI) consulted for Crohn's management. Course significant for uptrending direct bilirubin with downtrending LFTs. T-tube uncapped on 10/25, tube study showing patent cystic/common bile ducts, minimal emptying into duodenum. LFTs/bilirubin continued to be trended. Creatinine uptrending, urine lytes showing intrinsic pathology. 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 Blood/fluid cultures sent. Started on high protein diet. Repeat CTAP on 11/1 showing no changes. Right upper extremity duplex obtained on 11/1 and consistent with brachial DVT. PICC line was replaced. Lovenox started for treatment of DVT. Hepatology followed patient during hospital stay, started ursodiol. Patient was transfused 1U PRBC on 11/6 for acute anemia, low anti Xa level, post transfusion CBC stable, started epo weekly. Left JOYCE drain removed on 11/7. Repeat upper extremity duplex on 11/10 negative for DVT. Course significant for new blood tinged output in eakins pouch, occult test positive for blood. Fungal cultures negative. Bedside US obtained for new shortness of breath on 11/13, consistent with small left pleural effusion, no right sided pleural effusions, echo normal. CT PE protocol obtained for persistent shortness of breath, showed no central PE; areas of groundglass attenuation involving the bilateral lung fields most pronounced at the ALDEN concerning for atypical pneumonia, edema or hemorrhage; moderate right and small left pleural effusion with atelectatic changes - diuresed as necessary. Diet advanced to low fat. RVP/MRSA swab negative, staph PCR positive. Repeat US on 11/18 showed small effusion, non worsening. New blood tinged output via ostomy, transfused 1U PRBC for acute anemia, achieved hemostasis with silver nitrate/surgicel, repeat CBC stable. Fever workup obtained on 11/22, antibiotics adjusted, urine lytes consistent with prerenal disease. Course complicated by atrial fibrillation with RVR (HR 140s), lopressor for rate control, improved. Discontinued TPN on 11/23. Bile culture positive for gram positive cocci. PICC line removed 11/24, blood cultures growing candida glabrata, infectious disease team was consulted, antibiotics broadened. TTE obtained on 11/24: mild concentric left ventricular hypertrophy, EF 65-70%, grade II left ventricular diastolic dysfunction with elevated filling pressure. Lactate uptrending, surveillance cultures sent. RUQ US: CBD only 5mm, contracted gallbladder, hepatic vessels patent; Bilateral upper extremity duplex: no evidence of DVT. Bile culture growing pseudomonas. Nephrology continued to follow patient, renal US on 12/1 negative for hydronephrosis, abdominal US negative for lesion and patent flow throughout. Bicarb gtt started on 12/1, labs trended. Bicarb gtt discontinued on 12/4 when improved. General surgery consulted for hyperbilirubinemia. +p-anca positive. PICC replaced on 12/4, TPN restarted. Repeat bedside US on 12/6 showing persistent bilateral pleural effusions, diuresed. Course significant for a fib w/ RVR, rate controlled accordingly. EBV positive on 12/8. Transfused 1U PRBC on 12/9 for hgb 7.5, repeat hgb 8.5. CMV negative. Continued to be IV fluid resuscitated as needed. Noted punctate skin bleed at wound on 12/18 requiring silver nitrate and surgicell. 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 Transfused 1U PRBC on 12/24 for hgb 7.3, repeat hgb improved. Holistic therapy nurse was consulted, followed throughout hospital stay. Transfused 1U PRBC for symptomatic anemia on 12/28, persistent punctuate wound bleeding, reinforced, held heparin. Attempted hemostasis of persistent bleed w/ suturing/cautery however unsuccessful. Hemostasis achieved w/ epi/saline gauze. Ileostomy reinforced, red rubber placed on 12/30. Perc cony tube capped on 1/2. TTE obtained on 1/4 showing: LVEF 75%, mild/moderate aortic regurg, PASP 40, RVEF wnl. Bedside ultrasound showing bilateral effusions. Bilateral lower extremity duplex obtained, no evidence of DVT. Right upper extremity PICC placed, left upper extremity removed on 1/5. Rate control medications continued to be adjusted accordingly, also started timolol eye drops for additional beta blocking effect. Started therapeutic lovenox on 1/10. Process started for approval of medication gattex on 1/10. Persistent ileostomy bleeding managed with manual pressure. Uptrending WBC, full workup completed, CXR showing left basilar opacity, likely atelectasis vs pneumonia, urinalysis negative, started on IV cefepime per infectious disease team. Perc cony recapped on 1/17, started on prophylactic lovenox. Cardiology and EP followed for symptomatic sinus pause of 10 seconds, likely vagal, restarted metoprolol and timolol. Lasix given for increasing bilateral pleural effusions seen on POCUS. Continued to be transfused as needed for bleeding/anemia. Given first dose of gattex on 1/24. Continued to be diuresed for edema/effusions. Overnight Ostomy oozy but self-resolved. On 1/25 40 lasix was given and 250 of Diamox for volume overload. On 1/26 was given 1u pRBCs for Hgb of 7.3 lasix 40 x 2 and diamox 250 x 2. On 2/1 the BiPAP was dc'ed. On 2/2 Bleeding at wound edge: silver nitrate and epinephrine soaked surgicel placed with improvement. Overnight he had bleeding from the wound bed and Eakins pouch was taken down, large clot visualized bleeder. Resolved with mannual pressure and surgicel. On 2/3 1u pRBCs was given. On 2/4 he had a 20 second run of asystole and became transiently disoriented and felt "off". Electrophysiology was reconsulted.       2/12: 1uprbc per hobar. TPN ordered for outpatient.   ON: Net negative 500 LR bolus given. 1uPRBC per primary team.   2/11: hb 8.4(7.5), TPN reordered. 500LR bolus for net neg.   ON: 1L bolus for I/O negative 1.5L, DC summary updated to have Face to face.   2/10: Blood mixed with ostomy output, bleeder found, controlled with silver nitrate and hemostat. SBP 88, CBC 7.5 (8.2). Ordered 1u PRBC  ON: Small slow bleed from ostomy site. Stopped on its own. Slightly nauseas. Given zofran 4mg x1.   2/9: Timolol gtt stopped. TSH up to 5.25 - incr synthroid to 50 per Horbar, he will repeat TSH T4 in a few days as outpt.   ON: AMRITA  2/8: 1U PRBC for hgb 7.5, punctate bleeding again, resolved by holding pressure.   ON: No issues. AM Hgb 7.5.  2/7: punctate bleed to fistula wound bed, stopped w/ silver nitrate. new PICC placed, old picc removed. Tube study done - cystic duct patent, perc cony tube d/c'd.   2/6: IR will do perc cony tube study eval for cystic duct tomorrow. if cystic duct open, will remove perc cony, if not, will do tube exchange.   2/5: EP pacemaker placed without issues. Called TPN pharmacy and [facility]--no MDs in house who write TPN, usually outside MD write TPN order. Stopcock pulled.   ON: Small pinpoint bleeding at inferior aspect of wound - self resolved; feels well, some stool in appliance. preopped for possible pacemaker  2/4: TPN reordered. 20 second run of asystole, became transiently disoriented and felt "off". EP reconsulted - will assess tomorrow for PM, made NPO.                         At the time of discharge, patient was hemodynamically stable to be discharged to __ with ___.            ***LAST UPDATED 1/24/2024**   78M w/ Crohn's, AFib/Flutter s/p DCCVs, remote ileocectomy and open appy, presented to [facility] with abdominal pain, outpatient CT concerning for dilated loops of small bowel and stomach, admitted for resolving SBO vs Crohn's flare, placed a nasogastric tube for decompression, plan for nonoperative management with serial abdominal exams and close hemodynamic monitoring. Course significant for uncontrolled hypertension, medicine team followed throughout their hospital stay, antihypertensives given accordingly. Given no progression of bowel function, decision was made to take patient to the OR on 6/26 for laparoscopic lysis of adhesions, converted to open lysis of adhesions, SBR x 3 and left in discontinuity, temporary abdominal closure. Transferred to SICU post op for hemodynamic monitoring, remained intubated. Course complicated by right sided pneumothorax, thoracic surgery was consulted, placed chest tube on 6/27. Patient returned to the OR on 6/28 for ex lap, removal of abthera, ileocolic resection, small bowel anastomosis, abdominal wall closure. Post operative hemoglobin downtrended to 8.5 from 9, given 1U PRBC. Started on TPN for nutritional optimization and successfully extubated to high flow NC on 6/29. Respiratory status continued to be monitored closely with serial CXRs, chest PT. Course complicated by fevers, altered mental status, fever workup obtained, CXR showing left lung consolidation/effusion, urinalysis negative, blood cultures negative. OR cultures growing enterococcus, infectious disease team consulted, antibiotics adjusted. PICC line was placed on 6/30, no complications. Patient was diuresed as necessary with lasix for fluid overload. Nasogastric tube was removed on 7/1. Wound vac changed 3 times/wk. Uptrending leukocytosis, CT chest AP was obtained on 7/2 showing small bowel malrotation, early/partial bowel obstruction vs post op ileus, small bilateral pleural effusions w/ bibasilar atelectasis. Transfused 1U PRBC for hemoglobin 7.7, responded appropriately. Diet was advanced as tolerated and per return of bowel function. Chest tube was removed on 7/3, repeat CXR with significant improvement. Patient was taken by interventional radiology team for perihepatic aspiration of serous fluid on 7/3. Patient returned to the OR on 7/5 for wound dehiscence for exlap, washout, ileocolic resection with end ileostomy, blow hole colostomy, fistula, red rubber from ileostomy to small bowel anastomosis; vicryl bridging mesh; R JOYCE below ileostomy, L JOYCE at small bowel enterotomy repair. SICU for post op monitoring.  Abdominal fluid growing C. tertium/Lactobacillus, antibiotics adjusted per ID team. Course complicated by post op fever, OR cultures growing rare yeast, started fluconazole. Successfully extubated on 7/6. Worked with ostomy nurses. Uncontrolled HTN, medications adjusted accordingly, transitioned to cardene gtt. Bedside US showing small bilateral pleural effusions, continued to be diuresed as necessary. TPN restarted, NGT and red rubber removed on 7/10, diet advanced as tolerated, cleared by speech and swallow evaluation for cough. Continued wound vac changes. Slight uptrending WBC post op, surveillance blood cultures sent, urine lytes sent, consistent with intrinsic renal disease. Echo obtained showing mild LVH w/ EF 65-70%. Liver function tests uptrending, ultrasound of abdomen completed showing no evidence of portal or hepatic venous thrombosis, fluid/sludge distended GB, no cholelithiasis or cholecystitis. Fever workup completed on 7/22 for Tmax 101. CT head/chest/abdomen/pelvis was obtained on 7/23 showing persistent ECF, Bilateral pleural effusions L>R. No acute intracranial hemorrhage. Blood cxs/MRSA/staph negative. Fistula had increasing output, wound care followed patient, imodium started for high output. Course complicated by acute delirium, all potential causes discontinued, stepped up to ICU care for further management, PICC line removed on 8/23, started empiric IV antibiotics for potential line sepsis. CT head obtained on 8/23 - normal scan, no hemorrhage or infarcts. Infectious workup obtained, blood cultures negative. TSH elevated, given IV synthroid. ICU course significant for hypoglycemia, hyponatremia, fevers - workup continued to be negative; elevated liver function tests. RUQ US obtained 8/26 showing distended gallbladder with sludge, no wall thickening/pericholecystic fluid, notable for right pleural effusion. CTAP obtained showing increased small R pleural effusion w/ atelectasis, no pneumoperitoneum. Diet advanced as tolerated, PICC and blood cultures remained negative. Patient clinically improved, was stepped down to telemetry on 8/28. Imodium increased per high ostomy output. PICC line placed on 9/1, TPN started for nutritional optimization. Patient was stepped up to ICU for severe ELVI, treated accordingly, mccauley placed. Restarted octreotide and lomotil for persistent high ostomy output. Mccauley removed on 9/3. Stepped down to telemetry when medically stable. Diet advanced as tolerated. Nephrology team consulted, urine lytes confirms SIADH. Course complicated by altered mental status, neurology team consulted, workup obtained. CT chest AP on 9/10 showing mild pulmonary edema, atelectasis vs pneumonia, no bowel obstruction, distended gallbladder w/ sludge. EEG obtained on 9/11: continuous mild generalized background slowing suggestive of a similar degree of diffuse or multifocal dysfunction; No epileptiform activity and no significant clinical events occurred. RUQ US consistent with distended gallbladder and small sludge, no cholelithiasis or acute cholecystitis. Taken for percutaneous cholecystectomy on 9/11. Continue IVF resuscitation for high ostomy output. Switched wound vac to wet to dry dressing changes, patient tolerating well. 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 AXR showing delayed gastric emptying given contrast remains in stomach, started erythromycin for gastroparesis. Wound care team consulted, switched wet to dry dressings to wound manager, restarted imodium. Patient worked with holistic therapy. Erythromycin eventually decreased and tapered off. Lomotil/imodium restarted, perc cony study performed, perc cnoy capped on 10/4. Diet advanced, fistula output increasing, uptrending BUN/Cr, IVF resuscitation as necessary. Started on cholestyramine on 10/18. Monitored BUN/Cr closely, FeNa 0.7% prerenal, continued IVF resuscitation. Dr. Yuan (GI) consulted for Crohn's management. Course significant for uptrending direct bilirubin with downtrending LFTs. T-tube uncapped on 10/25, tube study showing patent cystic/common bile ducts, minimal emptying into duodenum. LFTs/bilirubin continued to be trended. Creatinine uptrending, urine lytes showing intrinsic pathology. 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 Blood/fluid cultures sent. Started on high protein diet. Repeat CTAP on 11/1 showing no changes. Right upper extremity duplex obtained on 11/1 and consistent with brachial DVT. PICC line was replaced. Lovenox started for treatment of DVT. Hepatology followed patient during hospital stay, started ursodiol. Patient was transfused 1U PRBC on 11/6 for acute anemia, low anti Xa level, post transfusion CBC stable, started epo weekly. Left JOYCE drain removed on 11/7. Repeat upper extremity duplex on 11/10 negative for DVT. Course significant for new blood tinged output in eakins pouch, occult test positive for blood. Fungal cultures negative. Bedside US obtained for new shortness of breath on 11/13, consistent with small left pleural effusion, no right sided pleural effusions, echo normal. CT PE protocol obtained for persistent shortness of breath, showed no central PE; areas of groundglass attenuation involving the bilateral lung fields most pronounced at the ALDEN concerning for atypical pneumonia, edema or hemorrhage; moderate right and small left pleural effusion with atelectatic changes - diuresed as necessary. Diet advanced to low fat. RVP/MRSA swab negative, staph PCR positive. Repeat US on 11/18 showed small effusion, non worsening. New blood tinged output via ostomy, transfused 1U PRBC for acute anemia, achieved hemostasis with silver nitrate/surgicel, repeat CBC stable. Fever workup obtained on 11/22, antibiotics adjusted, urine lytes consistent with prerenal disease. Course complicated by atrial fibrillation with RVR (HR 140s), lopressor for rate control, improved. Discontinued TPN on 11/23. Bile culture positive for gram positive cocci. PICC line removed 11/24, blood cultures growing candida glabrata, infectious disease team was consulted, antibiotics broadened. TTE obtained on 11/24: mild concentric left ventricular hypertrophy, EF 65-70%, grade II left ventricular diastolic dysfunction with elevated filling pressure. Lactate uptrending, surveillance cultures sent. RUQ US: CBD only 5mm, contracted gallbladder, hepatic vessels patent; Bilateral upper extremity duplex: no evidence of DVT. Bile culture growing pseudomonas. Nephrology continued to follow patient, renal US on 12/1 negative for hydronephrosis, abdominal US negative for lesion and patent flow throughout. Bicarb gtt started on 12/1, labs trended. Bicarb gtt discontinued on 12/4 when improved. General surgery consulted for hyperbilirubinemia. +p-anca positive. PICC replaced on 12/4, TPN restarted. Repeat bedside US on 12/6 showing persistent bilateral pleural effusions, diuresed. Course significant for a fib w/ RVR, rate controlled accordingly. EBV positive on 12/8. Transfused 1U PRBC on 12/9 for hgb 7.5, repeat hgb 8.5. CMV negative. Continued to be IV fluid resuscitated as needed. Noted punctate skin bleed at wound on 12/18 requiring silver nitrate and surgicell. 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 Transfused 1U PRBC on 12/24 for hgb 7.3, repeat hgb improved. Holistic therapy nurse was consulted, followed throughout hospital stay. Transfused 1U PRBC for symptomatic anemia on 12/28, persistent punctuate wound bleeding, reinforced, held heparin. Attempted hemostasis of persistent bleed w/ suturing/cautery however unsuccessful. Hemostasis achieved w/ epi/saline gauze. Ileostomy reinforced, red rubber placed on 12/30. Perc cony tube capped on 1/2. TTE obtained on 1/4 showing: LVEF 75%, mild/moderate aortic regurg, PASP 40, RVEF wnl. Bedside ultrasound showing bilateral effusions. Bilateral lower extremity duplex obtained, no evidence of DVT. Right upper extremity PICC placed, left upper extremity removed on 1/5. Rate control medications continued to be adjusted accordingly, also started timolol eye drops for additional beta blocking effect. Started therapeutic lovenox on 1/10. Process started for approval of medication gattex on 1/10. Persistent ileostomy bleeding managed with manual pressure. Uptrending WBC, full workup completed, CXR showing left basilar opacity, likely atelectasis vs pneumonia, urinalysis negative, started on IV cefepime per infectious disease team. Perc cony recapped on 1/17, started on prophylactic lovenox. Cardiology and EP followed for symptomatic sinus pause of 10 seconds, likely vagal, restarted metoprolol and timolol. Lasix given for increasing bilateral pleural effusions seen on POCUS. Continued to be transfused as needed for bleeding/anemia. Given first dose of gattex on 1/24. Continued to be diuresed for edema/effusions. Overnight Ostomy oozy but self-resolved. On 1/25 40 lasix was given and 250 of Diamox for volume overload. On 1/26 was given 1u pRBCs for Hgb of 7.3 lasix 40 x 2 and diamox 250 x 2. On 2/1 the BiPAP was dc'ed. On 2/2 Bleeding at wound edge: silver nitrate and epinephrine soaked surgicel placed with improvement. Overnight he had bleeding from the wound bed and Eakins pouch was taken down, large clot visualized bleeder. Resolved with mannual pressure and surgicel. On 2/3 1u pRBCs was given. On 2/4 he had a 20 second run of asystole and became transiently disoriented and felt "off". Electrophysiology was reconsulted. On 2/5 an EP pacemaker was placed without issues. On 2/7 punctate bleed to fistula wound bed, which was stopped w/ silver nitrate. A new PICC line was placed and the old picc removed. Tube study done showed cystic duct patent, perc cony tube discontinued. On 2/8 he was given 1u pRBCs for Hgb of 7.5. On 2/9 timolol gtt was stopped and STH came back as 5.25 so synthroid was increased to 50 mcg. On 2/10 there was blood mixed with ostomy output and the bleeding source was found, controlled with silver nitrate and hemostat. SBP was 88mmHg, CBC was 7.5 (from 8.2) and 1u pRBCs was given. On 2/11 overnight, he was given 1u pRBCs. TPN was ordered for outpatient. At the time of discharge, patient was hemodynamically stable to be discharged to home with home health care.             78M w/ Crohn's, AFib/Flutter s/p DCCVs, remote ileocectomy and open appy, presented to [facility] with abdominal pain, outpatient CT concerning for dilated loops of small bowel and stomach, admitted for resolving SBO vs Crohn's flare, placed a nasogastric tube for decompression, plan for nonoperative management with serial abdominal exams and close hemodynamic monitoring. Course significant for uncontrolled hypertension, medicine team followed throughout their hospital stay, antihypertensives given accordingly. Given no progression of bowel function, decision was made to take patient to the OR on 6/26 for laparoscopic lysis of adhesions, converted to open lysis of adhesions, SBR x 3 and left in discontinuity, temporary abdominal closure. Transferred to SICU post op for hemodynamic monitoring, remained intubated. Course complicated by right sided pneumothorax, thoracic surgery was consulted, placed chest tube on 6/27. Patient returned to the OR on 6/28 for ex lap, removal of abthera, ileocolic resection, small bowel anastomosis, abdominal wall closure. Post operative hemoglobin downtrended to 8.5 from 9, given 1U PRBC. Started on TPN for nutritional optimization and successfully extubated to high flow NC on 6/29. Respiratory status continued to be monitored closely with serial CXRs, chest PT. Course complicated by fevers, altered mental status, fever workup obtained, CXR showing left lung consolidation/effusion, urinalysis negative, blood cultures negative. OR cultures growing enterococcus, infectious disease team consulted, antibiotics adjusted. PICC line was placed on 6/30, no complications. Patient was diuresed as necessary with lasix for fluid overload. Nasogastric tube was removed on 7/1. Wound vac changed 3 times/wk. Uptrending leukocytosis, CT chest AP was obtained on 7/2 showing small bowel malrotation, early/partial bowel obstruction vs post op ileus, small bilateral pleural effusions w/ bibasilar atelectasis. Transfused 1U PRBC for hemoglobin 7.7, responded appropriately. Diet was advanced as tolerated and per return of bowel function. Chest tube was removed on 7/3, repeat CXR with significant improvement. Patient was taken by interventional radiology team for perihepatic aspiration of serous fluid on 7/3. Patient returned to the OR on 7/5 for wound dehiscence for exlap, washout, ileocolic resection with end ileostomy, blow hole colostomy, fistula, red rubber from ileostomy to small bowel anastomosis; vicryl bridging mesh; R JOYCE below ileostomy, L JOYCE at small bowel enterotomy repair. SICU for post op monitoring.  Abdominal fluid growing C. tertium/Lactobacillus, antibiotics adjusted per ID team. Course complicated by post op fever, OR cultures growing rare yeast, started fluconazole. Successfully extubated on 7/6. Worked with ostomy nurses. Uncontrolled HTN, medications adjusted accordingly, transitioned to cardene gtt. Bedside US showing small bilateral pleural effusions, continued to be diuresed as necessary. TPN restarted, NGT and red rubber removed on 7/10, diet advanced as tolerated, cleared by speech and swallow evaluation for cough. Continued wound vac changes. Slight uptrending WBC post op, surveillance blood cultures sent, urine lytes sent, consistent with intrinsic renal disease. Echo obtained showing mild LVH w/ EF 65-70%. Liver function tests uptrending, ultrasound of abdomen completed showing no evidence of portal or hepatic venous thrombosis, fluid/sludge distended GB, no cholelithiasis or cholecystitis. Fever workup completed on 7/22 for Tmax 101. CT head/chest/abdomen/pelvis was obtained on 7/23 showing persistent ECF, Bilateral pleural effusions L>R. No acute intracranial hemorrhage. Blood cxs/MRSA/staph negative. Fistula had increasing output, wound care followed patient, imodium started for high output. Course complicated by acute delirium, all potential causes discontinued, stepped up to ICU care for further management, PICC line removed on 8/23, started empiric IV antibiotics for potential line sepsis. CT head obtained on 8/23 - normal scan, no hemorrhage or infarcts. Infectious workup obtained, blood cultures negative. TSH elevated, given IV synthroid. ICU course significant for hypoglycemia, hyponatremia, fevers - workup continued to be negative; elevated liver function tests. RUQ US obtained 8/26 showing distended gallbladder with sludge, no wall thickening/pericholecystic fluid, notable for right pleural effusion. CTAP obtained showing increased small R pleural effusion w/ atelectasis, no pneumoperitoneum. Diet advanced as tolerated, PICC and blood cultures remained negative. Patient clinically improved, was stepped down to telemetry on 8/28. Imodium increased per high ostomy output. PICC line placed on 9/1, TPN started for nutritional optimization. Patient was stepped up to ICU for severe ELVI, treated accordingly, mccauley placed. Restarted octreotide and lomotil for persistent high ostomy output. Mccauley removed on 9/3. Stepped down to telemetry when medically stable. Diet advanced as tolerated. Nephrology team consulted, urine lytes confirms SIADH. Course complicated by altered mental status, neurology team consulted, workup obtained. CT chest AP on 9/10 showing mild pulmonary edema, atelectasis vs pneumonia, no bowel obstruction, distended gallbladder w/ sludge. EEG obtained on 9/11: continuous mild generalized background slowing suggestive of a similar degree of diffuse or multifocal dysfunction; No epileptiform activity and no significant clinical events occurred. RUQ US consistent with distended gallbladder and small sludge, no cholelithiasis or acute cholecystitis. Taken for percutaneous cholecystectomy on 9/11. Continue IVF resuscitation for high ostomy output. Switched wound vac to wet to dry dressing changes, patient tolerating well. 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 AXR showing delayed gastric emptying given contrast remains in stomach, started erythromycin for gastroparesis. Wound care team consulted, switched wet to dry dressings to wound manager, restarted imodium. Patient worked with holistic therapy. Erythromycin eventually decreased and tapered off. Lomotil/imodium restarted, perc cony study performed, perc cony capped on 10/4. Diet advanced, fistula output increasing, uptrending BUN/Cr, IVF resuscitation as necessary. Started on cholestyramine on 10/18. Monitored BUN/Cr closely, FeNa 0.7% prerenal, continued IVF resuscitation. Dr. Yuan (GI) consulted for Crohn's management. Course significant for uptrending direct bilirubin with downtrending LFTs. T-tube uncapped on 10/25, tube study showing patent cystic/common bile ducts, minimal emptying into duodenum. LFTs/bilirubin continued to be trended. Creatinine uptrending, urine lytes showing intrinsic pathology. 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 Blood/fluid cultures sent. Started on high protein diet. Repeat CTAP on 11/1 showing no changes. Right upper extremity duplex obtained on 11/1 and consistent with brachial DVT. PICC line was replaced. Lovenox started for treatment of DVT. Hepatology followed patient during hospital stay, started ursodiol. Patient was transfused 1U PRBC on 11/6 for acute anemia, low anti Xa level, post transfusion CBC stable, started epo weekly. Left JOYCE drain removed on 11/7. Repeat upper extremity duplex on 11/10 negative for DVT. Course significant for new blood tinged output in eakins pouch, occult test positive for blood. Fungal cultures negative. Bedside US obtained for new shortness of breath on 11/13, consistent with small left pleural effusion, no right sided pleural effusions, echo normal. CT PE protocol obtained for persistent shortness of breath, showed no central PE; areas of groundglass attenuation involving the bilateral lung fields most pronounced at the ALDEN concerning for atypical pneumonia, edema or hemorrhage; moderate right and small left pleural effusion with atelectatic changes - diuresed as necessary. Diet advanced to low fat. RVP/MRSA swab negative, staph PCR positive. Repeat US on 11/18 showed small effusion, non worsening. New blood tinged output via ostomy, transfused 1U PRBC for acute anemia, achieved hemostasis with silver nitrate/surgicel, repeat CBC stable. Fever workup obtained on 11/22, antibiotics adjusted, urine lytes consistent with prerenal disease. Course complicated by atrial fibrillation with RVR (HR 140s), lopressor for rate control, improved. Discontinued TPN on 11/23. Bile culture positive for gram positive cocci. PICC line removed 11/24, blood cultures growing candida glabrata, infectious disease team was consulted, antibiotics broadened. TTE obtained on 11/24: mild concentric left ventricular hypertrophy, EF 65-70%, grade II left ventricular diastolic dysfunction with elevated filling pressure. Lactate uptrending, surveillance cultures sent. RUQ US: CBD only 5mm, contracted gallbladder, hepatic vessels patent; Bilateral upper extremity duplex: no evidence of DVT. Bile culture growing pseudomonas. Nephrology continued to follow patient, renal US on 12/1 negative for hydronephrosis, abdominal US negative for lesion and patent flow throughout. Bicarb gtt started on 12/1, labs trended. Bicarb gtt discontinued on 12/4 when improved. General surgery consulted for hyperbilirubinemia. +p-anca positive. PICC replaced on 12/4, TPN restarted. Repeat bedside US on 12/6 showing persistent bilateral pleural effusions, diuresed. Course significant for a fib w/ RVR, rate controlled accordingly. EBV positive on 12/8. Transfused 1U PRBC on 12/9 for hgb 7.5, repeat hgb 8.5. CMV negative. Continued to be IV fluid resuscitated as needed. Noted punctate skin bleed at wound on 12/18 requiring silver nitrate and surgicell. 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 Transfused 1U PRBC on 12/24 for hgb 7.3, repeat hgb improved. Holistic therapy nurse was consulted, followed throughout hospital stay. Transfused 1U PRBC for symptomatic anemia on 12/28, persistent punctuate wound bleeding, reinforced, held heparin. Attempted hemostasis of persistent bleed w/ suturing/cautery however unsuccessful. Hemostasis achieved w/ epi/saline gauze. Ileostomy reinforced, red rubber placed on 12/30. Perc cony tube capped on 1/2. TTE obtained on 1/4 showing: LVEF 75%, mild/moderate aortic regurg, PASP 40, RVEF wnl. Bedside ultrasound showing bilateral effusions. Bilateral lower extremity duplex obtained, no evidence of DVT. Right upper extremity PICC placed, left upper extremity removed on 1/5. Rate control medications continued to be adjusted accordingly, also started timolol eye drops for additional beta blocking effect. Started therapeutic lovenox on 1/10. Process started for approval of medication gattex on 1/10. Persistent ileostomy bleeding managed with manual pressure. Uptrending WBC, full workup completed, CXR showing left basilar opacity, likely atelectasis vs pneumonia, urinalysis negative, started on IV cefepime per infectious disease team. Perc cony recapped on 1/17, started on prophylactic lovenox. Cardiology and EP followed for symptomatic sinus pause of 10 seconds, likely vagal, restarted metoprolol and timolol. Lasix given for increasing bilateral pleural effusions seen on POCUS. Continued to be transfused as needed for bleeding/anemia. Given first dose of gattex on 1/24. Continued to be diuresed for edema/effusions. Overnight Ostomy oozy but self-resolved. On 1/25 40 lasix was given and 250 of Diamox for volume overload. On 1/26 was given 1u pRBCs for Hgb of 7.3 lasix 40 x 2 and diamox 250 x 2. On 2/1 the BiPAP was dc'ed. On 2/2 Bleeding at wound edge: silver nitrate and epinephrine soaked surgicel placed with improvement. Overnight he had bleeding from the wound bed and Eakins pouch was taken down, large clot visualized bleeder. Resolved with mannual pressure and surgicel. On 2/3 1u pRBCs was given. On 2/4 he had a 20 second run of asystole and became transiently disoriented and felt "off". Electrophysiology was reconsulted. On 2/5 an EP pacemaker was placed without issues. On 2/7 punctate bleed to fistula wound bed, which was stopped w/ silver nitrate. A new PICC line was placed and the old picc removed. Tube study done showed cystic duct patent, perc cony tube discontinued. On 2/8 he was given 1u pRBCs for Hgb of 7.5. On 2/9 timolol gtt was stopped and STH came back as 5.25 so synthroid was increased to 50 mcg. On 2/10 there was blood mixed with ostomy output and the bleeding source was found, controlled with silver nitrate and hemostat. SBP was 88mmHg, CBC was 7.5 (from 8.2) and 1u pRBCs was given. On 2/11 overnight, he was given 1u pRBCs. TPN was ordered for outpatient. At the time of discharge, patient was hemodynamically stable to be discharged to home with home health care.             78M w/ Crohn's, AFib/Flutter s/p DCCVs, remote ileocectomy and open appy, presented to [facility] with abdominal pain, outpatient CT concerning for dilated loops of small bowel and stomach, admitted for resolving SBO vs Crohn's flare, placed a nasogastric tube for decompression, plan for nonoperative management with serial abdominal exams and close hemodynamic monitoring. Course significant for uncontrolled hypertension, medicine team followed throughout their hospital stay, antihypertensives given accordingly. Given no progression of bowel function, decision was made to take patient to the OR on 6/26 for laparoscopic lysis of adhesions, converted to open lysis of adhesions, SBR x 3 and left in discontinuity, temporary abdominal closure. Transferred to SICU post op for hemodynamic monitoring, remained intubated. Course complicated by right sided pneumothorax, thoracic surgery was consulted, placed chest tube on 6/27. Patient returned to the OR on 6/28 for ex lap, removal of abthera, ileocolic resection, small bowel anastomosis, abdominal wall closure. Post operative hemoglobin downtrended to 8.5 from 9, given 1U PRBC. Started on TPN for nutritional optimization and successfully extubated to high flow NC on 6/29. Respiratory status continued to be monitored closely with serial CXRs, chest PT. Course complicated by fevers, altered mental status, fever workup obtained, CXR showing left lung consolidation/effusion, urinalysis negative, blood cultures negative. OR cultures growing enterococcus, infectious disease team consulted, antibiotics adjusted. PICC line was placed on 6/30, no complications. Patient was diuresed as necessary with lasix for fluid overload. Nasogastric tube was removed on 7/1. Wound vac changed 3 times/wk. Uptrending leukocytosis, CT chest AP was obtained on 7/2 showing small bowel malrotation, early/partial bowel obstruction vs post op ileus, small bilateral pleural effusions w/ bibasilar atelectasis. Transfused 1U PRBC for hemoglobin 7.7, responded appropriately. Diet was advanced as tolerated and per return of bowel function. Chest tube was removed on 7/3, repeat CXR with significant improvement. Patient was taken by interventional radiology team for perihepatic aspiration of serous fluid on 7/3. Patient returned to the OR on 7/5 for wound dehiscence for exlap, washout, ileocolic resection with end ileostomy, blow hole colostomy, fistula, red rubber from ileostomy to small bowel anastomosis; vicryl bridging mesh; R JOYCE below ileostomy, L JOYCE at small bowel enterotomy repair. SICU for post op monitoring.  Abdominal fluid growing C. tertium/Lactobacillus, antibiotics adjusted per ID team. Course complicated by post op fever, OR cultures growing rare yeast, started fluconazole. Successfully extubated on 7/6. Worked with ostomy nurses. Uncontrolled HTN, medications adjusted accordingly, transitioned to cardene gtt. Bedside US showing small bilateral pleural effusions, continued to be diuresed as necessary. TPN restarted, NGT and red rubber removed on 7/10, diet advanced as tolerated, cleared by speech and swallow evaluation for cough. Continued wound vac changes. Slight uptrending WBC post op, surveillance blood cultures sent, urine lytes sent, consistent with intrinsic renal disease. Echo obtained showing mild LVH w/ EF 65-70%. Liver function tests uptrending, ultrasound of abdomen completed showing no evidence of portal or hepatic venous thrombosis, fluid/sludge distended GB, no cholelithiasis or cholecystitis. Fever workup completed on 7/22 for Tmax 101. CT head/chest/abdomen/pelvis was obtained on 7/23 showing persistent ECF, Bilateral pleural effusions L>R. No acute intracranial hemorrhage. Blood cxs/MRSA/staph negative. Fistula had increasing output, wound care followed patient, imodium started for high output. Course complicated by acute delirium, all potential causes discontinued, stepped up to ICU care for further management, PICC line removed on 8/23, started empiric IV antibiotics for potential line sepsis. CT head obtained on 8/23 - normal scan, no hemorrhage or infarcts. Infectious workup obtained, blood cultures negative. TSH elevated, given IV synthroid. ICU course significant for hypoglycemia, hyponatremia, fevers - workup continued to be negative; elevated liver function tests. RUQ US obtained 8/26 showing distended gallbladder with sludge, no wall thickening/pericholecystic fluid, notable for right pleural effusion. CTAP obtained showing increased small R pleural effusion w/ atelectasis, no pneumoperitoneum. Diet advanced as tolerated, PICC and blood cultures remained negative. Patient clinically improved, was stepped down to telemetry on 8/28. Imodium increased per high ostomy output. PICC line placed on 9/1, TPN started for nutritional optimization. Patient was stepped up to ICU for severe ELVI, treated accordingly, mccauley placed. Restarted octreotide and lomotil for persistent high ostomy output. Mccauley removed on 9/3. Stepped down to telemetry when medically stable. Diet advanced as tolerated. Nephrology team consulted, urine lytes confirms SIADH. Course complicated by altered mental status, neurology team consulted, workup obtained. CT chest AP on 9/10 showing mild pulmonary edema, atelectasis vs pneumonia, no bowel obstruction, distended gallbladder w/ sludge. EEG obtained on 9/11: continuous mild generalized background slowing suggestive of a similar degree of diffuse or multifocal dysfunction; No epileptiform activity and no significant clinical events occurred. RUQ US consistent with distended gallbladder and small sludge, no cholelithiasis or acute cholecystitis. Taken for percutaneous cholecystectomy on 9/11. Continue IVF resuscitation for high ostomy output. Switched wound vac to wet to dry dressing changes, patient tolerating well. 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 AXR showing delayed gastric emptying given contrast remains in stomach, started erythromycin for gastroparesis. Wound care team consulted, switched wet to dry dressings to wound manager, restarted imodium. Patient worked with holistic therapy. Erythromycin eventually decreased and tapered off. Lomotil/imodium restarted, perc cony study performed, perc cony capped on 10/4. Diet advanced, fistula output increasing, uptrending BUN/Cr, IVF resuscitation as necessary. Started on cholestyramine on 10/18. Monitored BUN/Cr closely, FeNa 0.7% prerenal, continued IVF resuscitation. Dr. Yuan (GI) consulted for Crohn's management. Course significant for uptrending direct bilirubin with downtrending LFTs. T-tube uncapped on 10/25, tube study showing patent cystic/common bile ducts, minimal emptying into duodenum. LFTs/bilirubin continued to be trended. Creatinine uptrending, urine lytes showing intrinsic pathology. 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 Blood/fluid cultures sent. Started on high protein diet. Repeat CTAP on 11/1 showing no changes. Right upper extremity duplex obtained on 11/1 and consistent with brachial DVT. PICC line was replaced. Lovenox started for treatment of DVT. Hepatology followed patient during hospital stay, started ursodiol. Patient was transfused 1U PRBC on 11/6 for acute anemia, low anti Xa level, post transfusion CBC stable, started epo weekly. Left JOYCE drain removed on 11/7. Repeat upper extremity duplex on 11/10 negative for DVT. Course significant for new blood tinged output in eakins pouch, occult test positive for blood. Fungal cultures negative. Bedside US obtained for new shortness of breath on 11/13, consistent with small left pleural effusion, no right sided pleural effusions, echo normal. CT PE protocol obtained for persistent shortness of breath, showed no central PE; areas of groundglass attenuation involving the bilateral lung fields most pronounced at the ALDEN concerning for atypical pneumonia, edema or hemorrhage; moderate right and small left pleural effusion with atelectatic changes - diuresed as necessary. Diet advanced to low fat. RVP/MRSA swab negative, staph PCR positive. Repeat US on 11/18 showed small effusion, non worsening. New blood tinged output via ostomy, transfused 1U PRBC for acute anemia, achieved hemostasis with silver nitrate/surgicel, repeat CBC stable. Fever workup obtained on 11/22, antibiotics adjusted, urine lytes consistent with prerenal disease. Course complicated by atrial fibrillation with RVR (HR 140s), lopressor for rate control, improved. Discontinued TPN on 11/23. Bile culture positive for gram positive cocci. PICC line removed 11/24, blood cultures growing candida glabrata, infectious disease team was consulted, antibiotics broadened. TTE obtained on 11/24: mild concentric left ventricular hypertrophy, EF 65-70%, grade II left ventricular diastolic dysfunction with elevated filling pressure. Lactate uptrending, surveillance cultures sent. RUQ US: CBD only 5mm, contracted gallbladder, hepatic vessels patent; Bilateral upper extremity duplex: no evidence of DVT. Bile culture growing pseudomonas. Nephrology continued to follow patient, renal US on 12/1 negative for hydronephrosis, abdominal US negative for lesion and patent flow throughout. Bicarb gtt started on 12/1, labs trended. Bicarb gtt discontinued on 12/4 when improved. General surgery consulted for hyperbilirubinemia. +p-anca positive. PICC replaced on 12/4, TPN restarted. Repeat bedside US on 12/6 showing persistent bilateral pleural effusions, diuresed. Course significant for a fib w/ RVR, rate controlled accordingly. EBV positive on 12/8. Transfused 1U PRBC on 12/9 for hgb 7.5, repeat hgb 8.5. CMV negative. Continued to be IV fluid resuscitated as needed. Noted punctate skin bleed at wound on 12/18 requiring silver nitrate and surgicell. 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 Transfused 1U PRBC on 12/24 for hgb 7.3, repeat hgb improved. Holistic therapy nurse was consulted, followed throughout hospital stay. Transfused 1U PRBC for symptomatic anemia on 12/28, persistent punctuate wound bleeding, reinforced, held heparin. Attempted hemostasis of persistent bleed w/ suturing/cautery however unsuccessful. Hemostasis achieved w/ epi/saline gauze. Ileostomy reinforced, red rubber placed on 12/30. Perc cony tube capped on 1/2. TTE obtained on 1/4 showing: LVEF 75%, mild/moderate aortic regurg, PASP 40, RVEF wnl. Bedside ultrasound showing bilateral effusions. Bilateral lower extremity duplex obtained, no evidence of DVT. Right upper extremity PICC placed, left upper extremity removed on 1/5. Rate control medications continued to be adjusted accordingly, also started timolol eye drops for additional beta blocking effect. Started therapeutic lovenox on 1/10. Process started for approval of medication gattex on 1/10. Persistent ileostomy bleeding managed with manual pressure. Uptrending WBC, full workup completed, CXR showing left basilar opacity, likely atelectasis vs pneumonia, urinalysis negative, started on IV cefepime per infectious disease team. Perc cony recapped on 1/17, started on prophylactic lovenox. Cardiology and EP followed for symptomatic sinus pause of 10 seconds, likely vagal, restarted metoprolol and timolol. Lasix given for increasing bilateral pleural effusions seen on POCUS. Continued to be transfused as needed for bleeding/anemia. Given first dose of gattex on 1/24. Continued to be diuresed for edema/effusions. Overnight Ostomy oozy but self-resolved. On 1/25 40 lasix was given and 250 of Diamox for volume overload. On 1/26 was given 1u pRBCs for Hgb of 7.3 lasix 40 x 2 and diamox 250 x 2. On 2/1 the BiPAP was dc'ed. On 2/2 Bleeding at wound edge: silver nitrate and epinephrine soaked surgicel placed with improvement. Overnight he had bleeding from the wound bed and Eakins pouch was taken down, large clot visualized bleeder. Resolved with mannual pressure and surgicel. On 2/3 1u pRBCs was given. On 2/4 he had a 20 second run of asystole and became transiently disoriented and felt "off". Electrophysiology was reconsulted. On 2/5 an EP pacemaker was placed without issues. On 2/7 punctate bleed to fistula wound bed, which was stopped w/ silver nitrate. A new PICC line was placed and the old picc removed. Tube study done showed cystic duct patent, perc cony tube discontinued. On 2/8 he was given 1u pRBCs for Hgb of 7.5. On 2/9 timolol gtt was stopped and STH came back as 5.25 so synthroid was increased to 50 mcg. On 2/10 there was blood mixed with ostomy output and the bleeding source was found, controlled with silver nitrate and hemostat. SBP was 88mmHg, CBC was 7.5 (from 8.2) and 1u pRBCs was given. On 2/11 overnight, he was given 1u pRBCs. TPN was ordered for outpatient. At the time of discharge, patient was hemodynamically stable to be discharged to home with home health care.

## 2023-06-27 NOTE — DISCHARGE NOTE PROVIDER - NSDCCPTREATMENT_GEN_ALL_CORE_FT
PRINCIPAL PROCEDURE  Procedure: Exploratory laparotomy  Findings and Treatment:       SECONDARY PROCEDURE  Procedure: Temporary closure of abdominal cavity  Findings and Treatment:     Procedure: Resection of small bowel  Findings and Treatment:     Procedure: Open lysis of intestinal adhesions  Findings and Treatment:     Procedure: Laparoscopic lysis of intestinal adhesions  Findings and Treatment:      PRINCIPAL PROCEDURE  Procedure: Exploratory laparotomy  Findings and Treatment:       SECONDARY PROCEDURE  Procedure: Washout of abdominal cavity  Findings and Treatment:     Procedure: Flap, myocutaneous, abdominal wall  Findings and Treatment:     Procedure: Reconstruction of abdominal wall using mesh  Findings and Treatment: abdominal wall reconstruction with stratis ***    Procedure: Small bowel resection with anastomosis  Findings and Treatment:     Procedure: Repair, anastomosis, ileocolic  Findings and Treatment: repair ileocolic resection with anastomosis    Procedure: Temporary closure of abdominal cavity  Findings and Treatment:     Procedure: Resection of small bowel  Findings and Treatment:     Procedure: Open lysis of intestinal adhesions  Findings and Treatment:     Procedure: Laparoscopic lysis of intestinal adhesions  Findings and Treatment:

## 2023-06-27 NOTE — CONSULT NOTE ADULT - SUBJECTIVE AND OBJECTIVE BOX
SICU Consult Note    HPI:  78M w/ PMHx of Crohn's, HTN, HLD, AFib/Flutter s/p multiple DCCV (on Amio), Gout, PSHx R IHR, ileocecectomy, open appy presents for abdominal pain since last night. Pt states his pain is constant, severe pain diffusely. He states he has gotten this pain previously, though not as intensely that he has managed at home. Last BM and flatus was today. Denies n/v. Denies f/c. Denies cp/sob. Denies urinary complaints. Denies PO intake today. Pt was sent for a CT scan as an outpatient that showed distended loops of bowel with a possible transition point in the RLQ w/ fecalization of bowel contents.    In the ED, pt was afebrile, nontachycardic, hypertensive, and satting well on RA. Labs significant for WBC 11.77. Hgb 13.0. LA 1.4.    PMHx: HTN, Atrial fibrillation s/p multiple DCCV, Crohn's disease, HLD, Hypothyroidism  PSHx: Ileocecectomy 30 years ago, open appy, H/O knee surgery, History of cataract surgery  Family Hx: Denies family hx of IBS, Crohn's, UC, or colon cancer.  Last Cscope: 6 months ago wnl  Last EGD: 6 months ago wnl  All: penicillin (Angioedema)    Meds: allopurinol 300 mg oral tablet: 1 tab(s) orally once a day  amiodarone 200 mg oral tablet: 1 orally once a day  metoprolol tartrate 50 mg oral tablet: 1 orally 2 times a day  rosuvastatin 5 mg oral tablet: 1 tab(s) orally once a day  Synthroid 50 mcg (0.05 mg) oral tablet: 1 tab(s) orally once a day  venlafaxine 150 mg oral tablet, extended release: 1 tab(s) orally 2 times a day      Vitals past 24h:  T(F): 97.6 (13:29), Max: 97.6 (13:29)  HR: 98 (13:29) (98 - 98)  BP: 194/72 (13:29) (194/72 - 194/72)  RR: 20 (13:29) (20 - 20)  SpO2: 98% (13:29) (98% - 98%)   (23 Jun 2023 16:52)      Intensivist:  Dr. Zaldivar    SICU Addendum:  78M w/ Crohn's, AFib/Flutter s/p DCCVs, remote ileocectomy and open appy here for resolving SBO vs Crohns flare, s/p NGT decompression. Now s/p laparoscopic lysis of adhesions, converted to open lysis of adhesions, SBR x 3 and left in discontinuity, temporary abdominal closure, 5L cryst, 1L 5% alb, 3 pRBC, 1 FFP, 1 plts. Admitted to SICU for further resuscitation      PAST MEDICAL & SURGICAL HISTORY:  Essential hypertension  Hypertension      Atrial fibrillation  s/p cardioversion 2010 and 2014  Pt. reports 4 DCCV  Now on Amiodarone 200mg PO bid and Eliquis 5mg PO bid  Last DCCV 4 yrs ago at       Crohn's disease  s/p partial resection of ileum      Hyperlipidemia      Hypothyroidism      History of depression  On Venlafaxine ER 150mg PO bid      H/O knee surgery      History of cataract surgery          MEDICATIONS  (STANDING):  aMIOdarone Infusion 0.501 mG/Min (16.7 mL/Hr) IV Continuous <Continuous>  cefTRIAXone   IVPB 1000 milliGRAM(s) IV Intermittent every 24 hours  chlorhexidine 0.12% Liquid 15 milliLiter(s) Oral Mucosa every 12 hours  chlorhexidine 2% Cloths 1 Application(s) Topical <User Schedule>  cisatracurium Infusion 3 MICROgram(s)/kG/Min (20.4 mL/Hr) IV Continuous <Continuous>  dextrose 5%. 1000 milliLiter(s) (50 mL/Hr) IV Continuous <Continuous>  dextrose 50% Injectable 25 Gram(s) IV Push once  fentaNYL   Infusion. 0.5 MICROgram(s)/kG/Hr (5.67 mL/Hr) IV Continuous <Continuous>  glucagon  Injectable 1 milliGRAM(s) IntraMuscular once  insulin lispro (ADMELOG) corrective regimen sliding scale   SubCutaneous every 6 hours  lactated ringers Bolus 500 milliLiter(s) IV Bolus once  lactated ringers. 1000 milliLiter(s) (120 mL/Hr) IV Continuous <Continuous>  levothyroxine Injectable 40 MICROGram(s) IV Push at bedtime  magnesium sulfate  IVPB 1 Gram(s) IV Intermittent once  metroNIDAZOLE  IVPB 500 milliGRAM(s) IV Intermittent every 8 hours  norepinephrine Infusion 0.05 MICROgram(s)/kG/Min (10.6 mL/Hr) IV Continuous <Continuous>  pantoprazole  Injectable 40 milliGRAM(s) IV Push daily  propofol Infusion 10 MICROgram(s)/kG/Min (6.8 mL/Hr) IV Continuous <Continuous>    MEDICATIONS  (PRN):  dextrose Oral Gel 15 Gram(s) Oral once PRN Blood Glucose LESS THAN 70 milliGRAM(s)/deciliter  ondansetron Injectable 4 milliGRAM(s) IV Push every 6 hours PRN Nausea      Allergies    penicillin (Angioedema)    Intolerances        SOCIAL HISTORY:    FAMILY HISTORY:      Vital Signs Last 24 Hrs  T(C): 36.2 (27 Jun 2023 00:37), Max: 37.9 (26 Jun 2023 13:15)  T(F): 97.2 (27 Jun 2023 00:37), Max: 100.2 (26 Jun 2023 13:15)  HR: 56 (27 Jun 2023 02:00) (56 - 68)  BP: 129/58 (27 Jun 2023 02:00) (128/61 - 200/81)  BP(mean): 84 (27 Jun 2023 02:00) (84 - 116)  RR: 15 (27 Jun 2023 02:00) (12 - 19)  SpO2: 98% (27 Jun 2023 02:00) (91% - 100%)    Parameters below as of 27 Jun 2023 01:43  Patient On (Oxygen Delivery Method): ventilator    O2 Concentration (%): 40    I&O's Summary    25 Jun 2023 07:01  -  26 Jun 2023 07:00  --------------------------------------------------------  IN: 2204.1 mL / OUT: 1900 mL / NET: 304.1 mL    26 Jun 2023 07:01  -  27 Jun 2023 02:42  --------------------------------------------------------  IN: 2531.1 mL / OUT: 510 mL / NET: 2021.1 mL        Physical Exam:  General: NAD, resting comfortably, sedated  HEENT: NC/AT, EOMI, normal hearing, no oral lesions, no LAD, neck supple, R TLC  Pulmonary: intubated AC, CTA-B, POCUS - ronni predominant  Cardiovascular: NSR, no murmurs  Abdominal: soft, open abdomen with abthera VAC in place - serosang output  Extremities: cool extremities, no clubbing/cyanosis/edema  Neuro: sedated and paralyzed  Pulses: palpable distal pulses    Lines/drains/tubes:    LABS:                        10.7   3.42  )-----------( 156      ( 26 Jun 2023 21:48 )             33.6     06-26    138  |  107  |  19  ----------------------------<  135<H>  4.0   |  21<L>  |  1.32<H>    Ca    8.3<L>      26 Jun 2023 21:48  Phos  3.2     06-26  Mg     1.7     06-26      PT/INR - ( 26 Jun 2023 21:48 )   PT: 15.4 sec;   INR: 1.29          PTT - ( 26 Jun 2023 21:48 )  PTT:27.4 sec  Urinalysis Basic - ( 26 Jun 2023 21:48 )    Color: x / Appearance: x / SG: x / pH: x  Gluc: 135 mg/dL / Ketone: x  / Bili: x / Urobili: x   Blood: x / Protein: x / Nitrite: x   Leuk Esterase: x / RBC: x / WBC x   Sq Epi: x / Non Sq Epi: x / Bacteria: x      CAPILLARY BLOOD GLUCOSE            Cultures:      RADIOLOGY & ADDITIONAL STUDIES:

## 2023-06-27 NOTE — CONSULT NOTE ADULT - ASSESSMENT
This is a 79 y/o male w/ PMHx of Crohn's, HTN, HLD, Afib/flutter on amiodarone, GOUT, ileocecectomy here for resolving SBO vs Crohn's flare s/p NGT decompression s/p laparoscopic lysis of adhesions converted to open with SBR x3 and abthera placed.      Problem 1.  Pneumothorax.  Right apical pigtail placed earlier today.  [facility] showed a decreased size in the pneumothorax.  It was placed on suction.  Will F/U morning CXR.  Keep tube in while intubated.  We will follow along for serial XRays.      Problem 2.  HTN. Pressors per primary team.      Problem 3.  Afib/flutter- On amio gtt

## 2023-06-27 NOTE — CONSULT NOTE ADULT - SUBJECTIVE AND OBJECTIVE BOX
Surgeon: Dr. Zambrano    Requesting Physician: Dr. Chaney    HISTORY OF PRESENT ILLNESS (Need 4):  This is a 79 y/o male w/ PMHx of Crohn's, HTN, HLD, Afib/flutter on amiodarone, GOUT, ileocecectomy here for resolving SBO vs Crohn's flare s/p NGT decompression s/p laparoscopic lysis of adhesions converted to open with SBR x3 and abthera placed.      PAST MEDICAL & SURGICAL HISTORY:  Essential hypertension  Hypertension      Atrial fibrillation  s/p cardioversion 2010 and 2014  Pt. reports 4 DCCV  Now on Amiodarone 200mg PO bid and Eliquis 5mg PO bid  Last DCCV 4 yrs ago at [facility]      Crohn's disease  s/p partial resection of ileum      Hyperlipidemia      Hypothyroidism      History of depression  On Venlafaxine ER 150mg PO bid      H/O knee surgery      History of cataract surgery          MEDICATIONS  (STANDING):  aMIOdarone Infusion 0.501 mG/Min (16.7 mL/Hr) IV Continuous <Continuous>  cefTRIAXone   IVPB 1000 milliGRAM(s) IV Intermittent every 24 hours  chlorhexidine 0.12% Liquid 15 milliLiter(s) Oral Mucosa every 12 hours  chlorhexidine 2% Cloths 1 Application(s) Topical <User Schedule>  dextrose 5% + lactated ringers. 1000 milliLiter(s) (120 mL/Hr) IV Continuous <Continuous>  dextrose 5%. 1000 milliLiter(s) (50 mL/Hr) IV Continuous <Continuous>  dextrose 50% Injectable 25 Gram(s) IV Push once  fentaNYL   Infusion. 0.5 MICROgram(s)/kG/Hr (5.67 mL/Hr) IV Continuous <Continuous>  glucagon  Injectable 1 milliGRAM(s) IntraMuscular once  heparin   Injectable 5000 Unit(s) SubCutaneous every 8 hours  insulin lispro (ADMELOG) corrective regimen sliding scale   SubCutaneous every 6 hours  lactated ringers Bolus 1000 milliLiter(s) IV Bolus once  levothyroxine Injectable 40 MICROGram(s) IV Push at bedtime  metroNIDAZOLE  IVPB 500 milliGRAM(s) IV Intermittent every 8 hours  norepinephrine Infusion 0.05 MICROgram(s)/kG/Min (10.6 mL/Hr) IV Continuous <Continuous>  pantoprazole  Injectable 40 milliGRAM(s) IV Push daily  propofol Infusion 10 MICROgram(s)/kG/Min (6.8 mL/Hr) IV Continuous <Continuous>    MEDICATIONS  (PRN):  dextrose Oral Gel 15 Gram(s) Oral once PRN Blood Glucose LESS THAN 70 milliGRAM(s)/deciliter  ondansetron Injectable 4 milliGRAM(s) IV Push every 6 hours PRN Nausea      Allergies    penicillin (Angioedema)    Intolerances      FAMILY HISTORY: Non-contributory      Review of Systems (Need 10): Pt intubated and sedated, unable to assess  CONSTITUTIONAL: Denies fevers / chills, sweats, fatigue, weight loss, weight gain                                       NEURO:  Denies parathesias, seizures, syncope, confusion                                                                                  EYES:  Denies blurry vision, discharge, pain, loss of vision                                                                                    ENMT:  Denies difficulty hearing, vertigo, dysphagia, epistaxis, recent dental work                                       CV:  Denies chest pain, palpitations, GABRIEL, orthopnea                                                                                           RESPIRATORY:  Denies wWheezing, SOB, cough / sputum, hemoptysis                                                               GI:  Denies nausea, vomiting, diarrhea, constipation, melena                                                                          : Denies hematuria, dysuria, urgency, incontinence                                                                                          MUSKULOSKELETAL:  Denies arthritis, joint swelling, muscle weakness                                                             SKIN/BREAST:  Denies rash, itching, hair loss, masses                                                                                              PSYCH:  Denies depression, anxiety, suicidal ideation                                                                                                HEME/LYMPH:  Denies bruises easily, enlarged lymph nodes, tender lymph nodes                                          ENDOCRINE:  Denies cold intolerance, heat intolerance, polydipsia                                                                      Vital Signs Last 24 Hrs  T(C): 36.3 (27 Jun 2023 09:00), Max: 36.4 (26 Jun 2023 21:55)  T(F): 97.3 (27 Jun 2023 09:00), Max: 97.5 (26 Jun 2023 21:55)  HR: 64 (27 Jun 2023 17:00) (53 - 75)  BP: 119/58 (27 Jun 2023 17:00) (105/54 - 137/64)  BP(mean): 84 (27 Jun 2023 17:00) (75 - 92)  RR: 16 (27 Jun 2023 17:00) (12 - 17)  SpO2: 95% (27 Jun 2023 17:00) (94% - 100%)    Parameters below as of 27 Jun 2023 17:00  Patient On (Oxygen Delivery Method): ventilator    O2 Concentration (%): 40    Physical Exam (Need 8)  GEN: Not assessed  Neuro: Not assessed  HEENT: ETT in place.    CV: S1S2, regular, no murmurs appreciated.  No carotid bruits.  No JVD  Lungs: Clear B/L.  No wheezing, rales or rhonchi. Mo crepitus felt.    ABD: Open abdomen, large vac dressing in place.    EXT: Warm and well perfused.  No peripheral edema noted  Musculoskeletal: Moving all extremities with normal ROM, no joint swelling  PV: Pedal pulses palpable    LABS:                        10.1   13.53 )-----------( 152      ( 27 Jun 2023 15:16 )             31.0     06-27    135  |  107  |  20  ----------------------------<  114<H>  4.0   |  20<L>  |  1.24    Ca    7.6<L>      27 Jun 2023 05:30  Phos  3.0     06-27  Mg     1.8     06-27      PT/INR - ( 26 Jun 2023 21:48 )   PT: 15.4 sec;   INR: 1.29          PTT - ( 26 Jun 2023 21:48 )  PTT:27.4 sec  Urinalysis Basic - ( 27 Jun 2023 05:30 )    Color: x / Appearance: x / SG: x / pH: x  Gluc: 114 mg/dL / Ketone: x  / Bili: x / Urobili: x   Blood: x / Protein: x / Nitrite: x   Leuk Esterase: x / RBC: x / WBC x   Sq Epi: x / Non Sq Epi: x / Bacteria: x        RADIOLOGY & ADDITIONAL STUDIES:  < from: Xray Chest 1 View-PORTABLE IMMEDIATE (Xray Chest 1 View-PORTABLE IMMEDIATE .) (06.27.23 @ 09:12) >    Findings/  impression: Left loop recorder Right pneumothorax, increased.   Satisfactory positioning of support devices. Bibasilar opacities/left   pleural effusion, increased. Stable heart size, mediastinum and bony   structures. Right humeral surgical anchors.    < end of copied text >

## 2023-06-27 NOTE — PROGRESS NOTE ADULT - SUBJECTIVE AND OBJECTIVE BOX
events of last evening noted  labs OK  UO adequate  weaning pressors  Vss stable  abd soft  small pneumo on CXR    Check labs  Check CXR  IVAB  Resuscitation  @nd look 24-48 hours Color consistent with ethnicity/race, warm, dry intact, resilient.

## 2023-06-27 NOTE — PROGRESS NOTE ADULT - SUBJECTIVE AND OBJECTIVE BOX
STATUS POST:  Exploratory laparotomy    Open lysis of intestinal adhesions    Resection of small bowel    Temporary closure of abdominal cavity    POST OPERATIVE DAY #: 1    SUBJECTIVE:   Patient seen and examined at bedside by chief during AM rounds. Overnight, patient noted to receive 3L in boluses for low UOP, pre-renal urine lytes and elevated lactate.  CXR overnight with mild/moderate R PTX. Patient remains intubated and sedated this AM. On low dose levophed.    Vital Signs Last 24 Hrs  T(C): 36.3 (27 Jun 2023 09:00), Max: 36.4 (26 Jun 2023 21:55)  T(F): 97.3 (27 Jun 2023 09:00), Max: 97.5 (26 Jun 2023 21:55)  HR: 63 (27 Jun 2023 14:00) (53 - 75)  BP: 112/55 (27 Jun 2023 14:00) (105/54 - 137/64)  BP(mean): 79 (27 Jun 2023 14:00) (75 - 92)  RR: 16 (27 Jun 2023 14:00) (12 - 17)  SpO2: 96% (27 Jun 2023 14:00) (94% - 100%)    Parameters below as of 27 Jun 2023 14:00  Patient On (Oxygen Delivery Method): ventilator    O2 Concentration (%): 40    I&O's Summary    26 Jun 2023 07:01  -  27 Jun 2023 07:00  --------------------------------------------------------  IN: 69471.7 mL / OUT: 1660 mL / NET: 36156.7 mL    27 Jun 2023 07:01  -  27 Jun 2023 14:50  --------------------------------------------------------  IN: 2173.5 mL / OUT: 855 mL / NET: 1318.5 mL        Physical Exam:  General: Intubated and sedated  HEENT: ATNC, ETT and NGT in place  Pulmonary: Mechanically ventilated  Cardiovascular: NSR  Abdomen: Soft, nondistended, midline abthera in place with good seal and SS ouput  : Poole in place draining concentrated urine  Extremities: WWP, SCDs in place, No significant edema appreciated    LABS:                        9.7    5.22  )-----------( 141      ( 27 Jun 2023 05:30 )             29.8     06-27    135  |  107  |  20  ----------------------------<  114<H>  4.0   |  20<L>  |  1.24    Ca    7.6<L>      27 Jun 2023 05:30  Phos  3.0     06-27  Mg     1.8     06-27      PT/INR - ( 26 Jun 2023 21:48 )   PT: 15.4 sec;   INR: 1.29          PTT - ( 26 Jun 2023 21:48 )  PTT:27.4 sec  Urinalysis Basic - ( 27 Jun 2023 05:30 )    Color: x / Appearance: x / SG: x / pH: x  Gluc: 114 mg/dL / Ketone: x  / Bili: x / Urobili: x   Blood: x / Protein: x / Nitrite: x   Leuk Esterase: x / RBC: x / WBC x   Sq Epi: x / Non Sq Epi: x / Bacteria: x

## 2023-06-27 NOTE — DISCHARGE NOTE PROVIDER - INSTRUCTIONS
Please continue a LOW-FIBER DIET. Listed below are some foods you may eat and those you should avoid.   --Allowed foods:  White bread without nuts and seeds  White rice, plain white pasta, and crackers  Refined hot cereals, such as Cream of Wheat, or cold cereals with less than 1 gram of fiber per serving  Pancakes or waffles made from white refined flour  Most canned or well-cooked vegetables and fruits without skins or seeds  Fruit and vegetable juice with little or no pulp, fruit-flavored drinks, and flavored weller  Tender meat, poultry, fish, eggs and tofu  Milk and foods made from milk — such as yogurt, pudding, ice cream, cheeses and sour cream — if tolerated  Butter, margarine, oils and salad dressings without seeds  --Foods to avoid:  Whole-wheat or whole-grain breads, cereals and pasta  Brown or wild rice and other whole grains, such as oats, kasha, barley and quinoa  Dried fruits and prune juice  Raw fruit, including those with seeds, skin or membranes, such as berries  Raw or undercooked vegetables, including corn  Dried beans, peas and lentils  Seeds and nuts and foods containing them, including peanut butter and other nut butters; Coconut; Popcorn

## 2023-06-27 NOTE — CHART NOTE - NSCHARTNOTEFT_GEN_A_CORE
Called this morning to evaluate patient for new right side pneumothorax following RIJ central line placement.  CXR at 1:45am shows ~20% pneumo, apical.  Repeat Xray at around 4am looks improved.  Pneumo is now basilar, no apical window seen.  Pneumo appears smaller in general.  Pt remains intubated with stable hemodynamics and stable from respiratory standpoint.  On 40% FiO2 and 5 PEEP.  Plan is to remain intubated until Thurs/Fri at the earliest until he has his abdomen closed.  Will check 9am CXR to trend.  Will follow along.  Consult note to follow, Dr. Zambrano will be the attending covering.  Please notify us of any significant hemodynamic changes or worsening respiratory status.

## 2023-06-27 NOTE — DISCHARGE NOTE PROVIDER - NSDCFUSCHEDAPPT_GEN_ALL_CORE_FT
[facility]  [address] [address]  Scheduled Appointment: 09/12/2023     [facility]  [address]  Scheduled Appointment: 09/12/2023     [facility] Physician [facility]  [address]  Scheduled Appointment: 09/12/2023

## 2023-06-27 NOTE — DISCHARGE NOTE PROVIDER - NSDCFUADDINST_GEN_ALL_CORE_FT
Follow up with  ___ in 1-2 weeks. Call the office at the number below to schedule your appointment. You may shower; soap and water over incision sites. Do not scrub. Pat dry when done. No tub bathing or swimming until cleared. Keep incision sites out of the sun as scars will darken. Ambulate as tolerated, but no heavy lifting (>10lbs) or strenuous exercise. You may resume regular diet. You should be urinating at least 3-4x per day. Call the office if you experience increasing abdominal pain, nausea, vomiting, or temperature >101 F.    Warning Signs:  Please call your doctor or nurse practitioner if you experience the following:  *You experience new chest pain, pressure, squeezing or tightness.  *New or worsening cough, shortness of breath, or wheeze.  *If you are vomiting and cannot keep down fluids or your medications.  *You are getting dehydrated due to continued vomiting, diarrhea, or other reasons. Signs of dehydration include dry mouth, rapid heartbeat, or feeling dizzy or faint when standing.  *You see blood or dark/black material when you vomit or have a bowel movement.  *You experience burning when you urinate, have blood in your urine, or experience a discharge.  *Your pain is not improving within 8-12 hours or is not gone within 24 hours. Call or return immediately if your pain is getting worse, changes location, or moves to your chest or back.  *You have shaking chills, or fever greater than 101.5 degrees Fahrenheit or 38 degrees Celsius.  *Any change in your symptoms, or any new symptoms that concern you.    New medications: Follow up with Dr. Peterson in 1-2 weeks. Call the office at [phone] to schedule your appointment. You may shower; soap and water over incision sites. Do not scrub. Pat dry when done. No tub bathing or swimming until cleared. Keep incision sites out of the sun as scars will darken. Ambulate as tolerated, but no heavy lifting (>10lbs) or strenuous exercise. You should be urinating at least 3-4x per day. Call the office if you experience increasing abdominal pain, nausea, vomiting, or temperature >101 F.    Warning Signs:  Please call your doctor or nurse practitioner if you experience the following:  *You experience new chest pain, pressure, squeezing or tightness.  *New or worsening cough, shortness of breath, or wheeze.  *If you are vomiting and cannot keep down fluids or your medications.  *You are getting dehydrated due to continued vomiting, diarrhea, or other reasons. Signs of dehydration include dry mouth, rapid heartbeat, or feeling dizzy or faint when standing.  *You see blood or dark/black material when you vomit or have a bowel movement.  *You experience burning when you urinate, have blood in your urine, or experience a discharge.  *Your pain is not improving within 8-12 hours or is not gone within 24 hours. Call or return immediately if your pain is getting worse, changes location, or moves to your chest or back.  *You have shaking chills, or fever greater than 101.5 degrees Fahrenheit or 38 degrees Celsius.  *Any change in your symptoms, or any new symptoms that concern you.    New medications: Follow up with Dr. Peterson in 1-2 weeks. Call the office at [phone] to schedule your appointment. You may shower; soap and water over incision sites. Do not scrub. Pat dry when done. No tub bathing or swimming until cleared. Keep incision sites out of the sun as scars will darken. Ambulate as tolerated, but no heavy lifting (>10lbs) or strenuous exercise. You should be urinating at least 3-4x per day. Call the office if you experience increasing abdominal pain, nausea, vomiting, or temperature >101 F.    Warning Signs:  Please call your doctor or nurse practitioner if you experience the following:  *You experience new chest pain, pressure, squeezing or tightness.  *New or worsening cough, shortness of breath, or wheeze.  *If you are vomiting and cannot keep down fluids or your medications.  *You are getting dehydrated due to continued vomiting, diarrhea, or other reasons. Signs of dehydration include dry mouth, rapid heartbeat, or feeling dizzy or faint when standing.  *You see blood or dark/black material when you vomit or have a bowel movement.  *You experience burning when you urinate, have blood in your urine, or experience a discharge.  *Your pain is not improving within 8-12 hours or is not gone within 24 hours. Call or return immediately if your pain is getting worse, changes location, or moves to your chest or back.  *You have shaking chills, or fever greater than 101.5 degrees Fahrenheit or 38 degrees Celsius.  *Any change in your symptoms, or any new symptoms that concern you.    New medications: Follow up with Dr. Peterson in 1-2 weeks. Call the office at [phone] to schedule your appointment. You may shower; soap and water over incision sites. Do not scrub. Pat dry when done. No tub bathing or swimming until cleared. Keep incision sites out of the sun as scars will darken. Ambulate as tolerated, but no heavy lifting (>10lbs) or strenuous exercise. You should be urinating at least 3-4x per day. Call the office if you experience increasing abdominal pain, nausea, vomiting, or temperature >101 F.    Warning Signs:  Please call your doctor or nurse practitioner if you experience the following:  *You experience new chest pain, pressure, squeezing or tightness.  *New or worsening cough, shortness of breath, or wheeze.  *If you are vomiting and cannot keep down fluids or your medications.  *You are getting dehydrated due to continued vomiting, diarrhea, or other reasons. Signs of dehydration include dry mouth, rapid heartbeat, or feeling dizzy or faint when standing.  *You see blood or dark/black material when you vomit or have a bowel movement.  *You experience burning when you urinate, have blood in your urine, or experience a discharge.  *Your pain is not improving within 8-12 hours or is not gone within 24 hours. Call or return immediately if your pain is getting worse, changes location, or moves to your chest or back.  *You have shaking chills, or fever greater than 101.5 degrees Fahrenheit or 38 degrees Celsius.  *Any change in your symptoms, or any new symptoms that concern you.    New medications: Follow up with Dr. Peterson in 1-2 weeks. Call the office at [phone] to schedule your appointment. You may shower; soap and water over incision sites. Do not scrub. Pat dry when done. No tub bathing or swimming until cleared. Keep incision sites out of the sun as scars will darken. Ambulate as tolerated, but no heavy lifting (>10lbs) or strenuous exercise. You should be urinating at least 3-4x per day. Call the office if you experience increasing abdominal pain, nausea, vomiting, or temperature >101 F.    Warning Signs:  Please call your doctor or nurse practitioner if you experience the following:  *You experience new chest pain, pressure, squeezing or tightness.  *New or worsening cough, shortness of breath, or wheeze.  *If you are vomiting and cannot keep down fluids or your medications.  *You are getting dehydrated due to continued vomiting, diarrhea, or other reasons. Signs of dehydration include dry mouth, rapid heartbeat, or feeling dizzy or faint when standing.  *You see blood or dark/black material when you vomit or have a bowel movement.  *You experience burning when you urinate, have blood in your urine, or experience a discharge.  *Your pain is not improving within 8-12 hours or is not gone within 24 hours. Call or return immediately if your pain is getting worse, changes location, or moves to your chest or back.  *You have shaking chills, or fever greater than 101.5 degrees Fahrenheit or 38 degrees Celsius.  *Any change in your symptoms, or any new symptoms that concern you.    New medications:    If bleedin. Completely empty pouch to see if bleeding is still active  2. If still bleeding, apply pressure to outside of pouch over bleeding area  3. If continues to bleed, remove pouch. Apply pressure using gauze for 5 minutes to area that is bleeding.  4. If still bleeding after 5 minutes, apply Silver Nitrate to bleeding area  5. If still bleeding after 5 applications of Silver Nitrate, apply epinephrine-soaked gauze to bleeding area with pressure. Keep in place for 5 minutes.  6. If still bleeding after 5 minutes of Epinephrine-soaked gauze, cut a small piece of Surgicel to cover area that is bleeding and apply pressure. Follow up with Dr. Peterson in 1-2 weeks. Call the office at [phone] to schedule your appointment. You may shower; soap and water over incision sites. Do not scrub. Pat dry when done. No tub bathing or swimming until cleared. Keep incision sites out of the sun as scars will darken. Ambulate as tolerated, but no heavy lifting (>10lbs) or strenuous exercise. You should be urinating at least 3-4x per day. Call the office if you experience increasing abdominal pain, nausea, vomiting, or temperature >101 F.    Warning Signs:  Please call your doctor or nurse practitioner if you experience the following:  *You experience new chest pain, pressure, squeezing or tightness.  *New or worsening cough, shortness of breath, or wheeze.  *If you are vomiting and cannot keep down fluids or your medications.  *You are getting dehydrated due to continued vomiting, diarrhea, or other reasons. Signs of dehydration include dry mouth, rapid heartbeat, or feeling dizzy or faint when standing.  *You see blood or dark/black material when you vomit or have a bowel movement.  *You experience burning when you urinate, have blood in your urine, or experience a discharge.  *Your pain is not improving within 8-12 hours or is not gone within 24 hours. Call or return immediately if your pain is getting worse, changes location, or moves to your chest or back.  *You have shaking chills, or fever greater than 101.5 degrees Fahrenheit or 38 degrees Celsius.  *Any change in your symptoms, or any new symptoms that concern you.    New medications:    If bleedin. Completely empty pouch to see if bleeding is still active  2. If still bleeding, apply pressure to outside of pouch over bleeding area  3. If continues to bleed, remove pouch. Apply pressure using gauze for 5 minutes to area that is bleeding.  4. If still bleeding after 5 minutes, apply Silver Nitrate to bleeding area  5. If still bleeding after 5 applications of Silver Nitrate, apply epinephrine-soaked gauze to bleeding area with pressure. Keep in place for 5 minutes.  6. If still bleeding after 5 minutes of Epinephrine-soaked gauze, cut a small piece of Surgicel to cover area that is bleeding and apply pressure. Follow up with Dr. Peterson in 1-2 weeks. Call the office at [phone] to schedule your appointment. You may shower; soap and water over incision sites. Do not scrub. Pat dry when done. No tub bathing or swimming until cleared. Keep incision sites out of the sun as scars will darken. Ambulate as tolerated, but no heavy lifting (>10lbs) or strenuous exercise. You should be urinating at least 3-4x per day. Call the office if you experience increasing abdominal pain, nausea, vomiting, or temperature >101 F.    Warning Signs:  Please call your doctor or nurse practitioner if you experience the following:  *You experience new chest pain, pressure, squeezing or tightness.  *New or worsening cough, shortness of breath, or wheeze.  *If you are vomiting and cannot keep down fluids or your medications.  *You are getting dehydrated due to continued vomiting, diarrhea, or other reasons. Signs of dehydration include dry mouth, rapid heartbeat, or feeling dizzy or faint when standing.  *You see blood or dark/black material when you vomit or have a bowel movement.  *You experience burning when you urinate, have blood in your urine, or experience a discharge.  *Your pain is not improving within 8-12 hours or is not gone within 24 hours. Call or return immediately if your pain is getting worse, changes location, or moves to your chest or back.  *You have shaking chills, or fever greater than 101.5 degrees Fahrenheit or 38 degrees Celsius.  *Any change in your symptoms, or any new symptoms that concern you.    New medications:    If bleedin. Completely empty pouch to see if bleeding is still active  2. If still bleeding, apply pressure to outside of pouch over bleeding area  3. If continues to bleed, remove pouch. Apply pressure using gauze for 5 minutes to area that is bleeding.  4. If still bleeding after 5 minutes, apply Silver Nitrate to bleeding area  5. If still bleeding after 5 applications of Silver Nitrate, apply epinephrine-soaked gauze to bleeding area with pressure. Keep in place for 5 minutes.  6. If still bleeding after 5 minutes of Epinephrine-soaked gauze, cut a small piece of Surgicel to cover area that is bleeding and apply pressure.

## 2023-06-27 NOTE — CONSULT NOTE ADULT - ASSESSMENT
78M w/ Crohn's, AFib/Flutter s/p DCCVs, remote ileocectomy and open appy here for resolving SBO vs Crohns flare, s/p NGT decompression. Now s/p laparoscopic lysis of adhesions, converted to open lysis of adhesions, SBR x 3 and left in discontinuity, temporary abdominal closure, 5L cryst, 1L 5% alb, 3 pRBC, 1 FFP, 1 plts.    Neuro: [place]: propofol, fentnyl; paralyzed - nimbex, zofran prn for nausea; Hx anxiety: hold venlafaxine  CV: MAP goal > 65; Hx of Afib/flutter: s/p DCCV, amio gtt cont, hold metop; Hx of HLD: hold rasovastatin; TTE (04/22) - PASP 50mmHg, EF 69%  Pulm: /15/5/40; R PTX   GI: SBO: NGT to LIWS; NPO; Hx of Crohn's: s/p ileocectomy, open appy; now open abdomen w/ abthera, after TRE, SBR x3  : [place], ELVI  ID: CTX (6/26-), Flagyl (6/26-)  Heme: EBL 1.5L, 3 units pRBC  Endo: ISS; Hx of hypothyroid: cont synthroid  PPx: SCD, hold SQH   Lines: PIV x 2, R TLC (06/26), [place] (6/26-)  Wounds: abthera  PT/OT:  not ordered  Dispo: SICU

## 2023-06-27 NOTE — DISCHARGE NOTE PROVIDER - NPI NUMBER (FOR [employer] USE ONLY) :
[[id number]] [[id number]] [[id number]] [[id number]],[[id number]] [[id number]],[[id number]],[[id number]] [[id number]],[[id number]],[[id number]],[[id number]]

## 2023-06-27 NOTE — DISCHARGE NOTE PROVIDER - NSDCMRMEDTOKEN_GEN_ALL_CORE_FT
allopurinol 300 mg oral tablet: 1 tab(s) orally once a day  amiodarone 200 mg oral tablet: 1 orally once a day  metoprolol tartrate 50 mg oral tablet: 1 orally 2 times a day  rosuvastatin 5 mg oral tablet: 1 tab(s) orally once a day  Synthroid 50 mcg (0.05 mg) oral tablet: 1 tab(s) orally once a day  venlafaxine 150 mg oral tablet, extended release: 1 tab(s) orally 2 times a day   allopurinol 300 mg oral tablet: 1 tab(s) orally once a day  amiodarone 200 mg oral tablet: 1 orally once a day  Hospital Bed ICD 10: R53.81: Use for deconditioning post prolonged hospital course  Hospital Bed ICD 10: R53.81: Use for deconditioning post prolonged hospital course  Hospital Bed ICD 10: R53.81: Use for deconditioning post prolonged hospital course  Hospital Bed ICD 10: R53.81: Use for deconditioning post prolonged hospital course  Metoprolol Succinate ER 25 mg oral tablet, extended release: 0.5 tab(s) orally once a day x 30 days  metoprolol tartrate 50 mg oral tablet: 1 orally 2 times a day  rosuvastatin 5 mg oral tablet: 1 tab(s) orally once a day  Synthroid 50 mcg (0.05 mg) oral tablet: 1 tab(s) orally once a day  venlafaxine 150 mg oral tablet, extended release: 1 tab(s) orally 2 times a day   allopurinol 300 mg oral tablet: 1 tab(s) orally once a day  amiodarone 200 mg oral tablet: 1 orally once a day  Hospital Bed ICD 10: R53.81: Use for deconditioning post prolonged hospital course  Hospital Bed ICD 10: R53.81: Use for deconditioning post prolonged hospital course  Hospital Bed ICD 10: R53.81: Use for deconditioning post prolonged hospital course  Hospital Bed ICD 10: R53.81: Use for deconditioning post prolonged hospital course  Metoprolol Succinate ER 25 mg oral tablet, extended release: 0.5 tab(s) orally once a day x 30 days  metoprolol tartrate 50 mg oral tablet: 1 orally 2 times a day  rosuvastatin 5 mg oral tablet: 1 tab(s) orally once a day  SandoSTATIN 100 mcg/mL injectable solution: 300 microgram(s) subcutaneously once a day MDD: 1  Synthroid 50 mcg (0.05 mg) oral tablet: 1 tab(s) orally once a day  venlafaxine 150 mg oral tablet, extended release: 1 tab(s) orally 2 times a day   allopurinol 300 mg oral tablet: 1 tab(s) orally once a day  amiodarone 200 mg oral tablet: 1 orally once a day  Hospital Bed ICD 10: R53.81: Use for deconditioning post prolonged hospital course  Hospital Bed ICD 10: R53.81: Use for deconditioning post prolonged hospital course  Hospital Bed ICD 10: R53.81: Use for deconditioning post prolonged hospital course  Hospital Bed ICD 10: R53.81: Use for deconditioning post prolonged hospital course  Lovenox 40 mg/0.4 mL injectable solution: 40 milligram(s) subcutaneously once a day  Metoprolol Succinate ER 25 mg oral tablet, extended release: 0.5 tab(s) orally once a day x 30 days  metoprolol tartrate 50 mg oral tablet: 1 orally 2 times a day  rosuvastatin 5 mg oral tablet: 1 tab(s) orally once a day  SandoSTATIN 100 mcg/mL injectable solution: 300 microgram(s) subcutaneously once a day MDD: 1  Synthroid 50 mcg (0.05 mg) oral tablet: 1 tab(s) orally once a day  venlafaxine 150 mg oral tablet, extended release: 1 tab(s) orally 2 times a day

## 2023-06-27 NOTE — PROGRESS NOTE ADULT - SUBJECTIVE AND OBJECTIVE BOX
Pt seen and examined   currently sedated, vent  operative findings noted    REVIEW OF SYSTEMS:  unable       MEDICATIONS:  MEDICATIONS  (STANDING):  aMIOdarone Infusion 0.501 mG/Min (16.7 mL/Hr) IV Continuous <Continuous>  cefTRIAXone   IVPB 1000 milliGRAM(s) IV Intermittent every 24 hours  chlorhexidine 0.12% Liquid 15 milliLiter(s) Oral Mucosa every 12 hours  chlorhexidine 2% Cloths 1 Application(s) Topical <User Schedule>  dextrose 5%. 1000 milliLiter(s) (50 mL/Hr) IV Continuous <Continuous>  dextrose 50% Injectable 25 Gram(s) IV Push once  fentaNYL   Infusion. 0.5 MICROgram(s)/kG/Hr (5.67 mL/Hr) IV Continuous <Continuous>  glucagon  Injectable 1 milliGRAM(s) IntraMuscular once  heparin   Injectable 5000 Unit(s) SubCutaneous every 8 hours  insulin lispro (ADMELOG) corrective regimen sliding scale   SubCutaneous every 6 hours  lactated ringers. 1000 milliLiter(s) (120 mL/Hr) IV Continuous <Continuous>  levothyroxine Injectable 40 MICROGram(s) IV Push at bedtime  metroNIDAZOLE  IVPB 500 milliGRAM(s) IV Intermittent every 8 hours  norepinephrine Infusion 0.05 MICROgram(s)/kG/Min (10.6 mL/Hr) IV Continuous <Continuous>  pantoprazole  Injectable 40 milliGRAM(s) IV Push daily  propofol Infusion 10 MICROgram(s)/kG/Min (6.8 mL/Hr) IV Continuous <Continuous>    MEDICATIONS  (PRN):  dextrose Oral Gel 15 Gram(s) Oral once PRN Blood Glucose LESS THAN 70 milliGRAM(s)/deciliter  ondansetron Injectable 4 milliGRAM(s) IV Push every 6 hours PRN Nausea      Allergies    penicillin (Angioedema)    Intolerances        Vital Signs Last 24 Hrs  T(C): 36.3 (27 Jun 2023 09:00), Max: 37.9 (26 Jun 2023 13:15)  T(F): 97.3 (27 Jun 2023 09:00), Max: 100.2 (26 Jun 2023 13:15)  HR: 62 (27 Jun 2023 12:00) (53 - 75)  BP: 112/57 (27 Jun 2023 12:00) (105/54 - 168/73)  BP(mean): 82 (27 Jun 2023 12:00) (75 - 105)  RR: 15 (27 Jun 2023 12:00) (12 - 18)  SpO2: 95% (27 Jun 2023 12:00) (94% - 100%)    Parameters below as of 27 Jun 2023 12:00  Patient On (Oxygen Delivery Method): ventilator    O2 Concentration (%): 40    06-26 @ 07:01  -  06-27 @ 07:00  --------------------------------------------------------  IN: 12105.7 mL / OUT: 1660 mL / NET: 42625.7 mL    06-27 @ 07:01 - 06-27 @ 13:03  --------------------------------------------------------  IN: 1362.6 mL / OUT: 495 mL / NET: 867.6 mL        PHYSICAL EXAM:    General: ; in no acute distress, vent  HEENT: MMM, conjunctiva and sclera clear, NGT  Gastrointestinal: Soft sl-distended; wound vac  Skin: Warm and dry. No obvious rash    LABS:  ABG - ( 27 Jun 2023 10:26 )  pH, Arterial: 7.35  pH, Blood: x     /  pCO2: 37    /  pO2: 82    / HCO3: 20    / Base Excess: -4.7  /  SaO2: 97.1                CBC Full  -  ( 27 Jun 2023 05:30 )  WBC Count : 5.22 K/uL  RBC Count : 3.12 M/uL  Hemoglobin : 9.7 g/dL  Hematocrit : 29.8 %  Platelet Count - Automated : 141 K/uL  Mean Cell Volume : 95.5 fl  Mean Cell Hemoglobin : 31.1 pg  Mean Cell Hemoglobin Concentration : 32.6 gm/dL  Auto Neutrophil # : x  Auto Lymphocyte # : x  Auto Monocyte # : x  Auto Eosinophil # : x  Auto Basophil # : x  Auto Neutrophil % : x  Auto Lymphocyte % : x  Auto Monocyte % : x  Auto Eosinophil % : x  Auto Basophil % : x    06-27    135  |  107  |  20  ----------------------------<  114<H>  4.0   |  20<L>  |  1.24    Ca    7.6<L>      27 Jun 2023 05:30  Phos  3.0     06-27  Mg     1.8     06-27      PT/INR - ( 26 Jun 2023 21:48 )   PT: 15.4 sec;   INR: 1.29          PTT - ( 26 Jun 2023 21:48 )  PTT:27.4 sec      Urinalysis Basic - ( 27 Jun 2023 05:30 )    Color: x / Appearance: x / SG: x / pH: x  Gluc: 114 mg/dL / Ketone: x  / Bili: x / Urobili: x   Blood: x / Protein: x / Nitrite: x   Leuk Esterase: x / RBC: x / WBC x   Sq Epi: x / Non Sq Epi: x / Bacteria: x                RADIOLOGY & ADDITIONAL STUDIES (The following images were personally reviewed):

## 2023-06-27 NOTE — PROGRESS NOTE ADULT - ASSESSMENT
INCOMPLETE NOTE 78M w/ Crohn's, AFib/Flutter s/p DCCVs, remote ileocectomy and open appy here for resolving SBO vs Crohns flare, s/p NGT decompression. Now s/p laparoscopic lysis of adhesions, converted to open lysis of adhesions, SBR x 3 and left in discontinuity, abthera vac placement, 5L cryst, 1L 5% alb, 3 pRBC, 1 FFP, 1 plts.    Plan for RTOR tomorrow  Wean from pressors   CTS consult for PTX - appreciate recommendations  Rest of care per SICU

## 2023-06-27 NOTE — DISCHARGE NOTE PROVIDER - CARE PROVIDER_API CALL
Rikki Peterson  Colon/Rectal Surgery  [address], [address]  [place], [place] [facility]  Phone: [phone]  Fax: [phone]  Follow Up Time:    Rikki Peterson  Colon/Rectal Surgery  [address], [address]  [place], [place] [facility]  Phone: [phone]  Fax: [phone]  Follow Up Time:    Rikki Peterson  Colon/Rectal Surgery  [address], [address]  [place], [place] [facility]  Phone: [phone]  Fax: [phone]  Follow Up Time:    Rikki Peterson  Colon/Rectal Surgery  [address], [address]  [place] [address]  Phone: [phone]  Fax: [phone]  Follow Up Time:     David Rollins  Interventional Radiology and Diagnostic Radiology  [address] [address]  Phone: [phone]  Fax: [phone]  Follow Up Time:     River Yuan  Gastroenterology  [address], [address]  [place] [address]  Phone: [phone]  Fax: [phone]  Follow Up Time:     Mitchel Zambrano  Thoracic Surgery  [address]  [place] [address]  Phone: [phone]  Fax: [phone]  Follow Up Time:    Rikki Peterson  Colon/Rectal Surgery  [address], [address]  [place] [address]  Phone: [phone]  Fax: [phone]  Follow Up Time:     David Rollins  Interventional Radiology and Diagnostic Radiology  [address] [address]  Phone: [phone]  Fax: [phone]  Follow Up Time:     River Yuan  Gastroenterology  [address], [address]  [place] [address]  Phone: [phone]  Fax: [phone]  Follow Up Time:     Mitchel Zambrano  Thoracic Surgery  [address], [address]  [place] [address]  Phone: [phone]  Fax: [phone]  Follow Up Time:    Rikki Peterson  Colon/Rectal Surgery  [address], [address]  [place] [address]  Phone: [phone]  Fax: [phone]  Follow Up Time:     David Rollins  Interventional Radiology and Diagnostic Radiology  [address] [address]  Phone: [phone]  Fax: [phone]  Follow Up Time:     River Yuan  Gastroenterology  [address]  [place] [address]  Phone: [phone]  Fax: [phone]  Follow Up Time:     Mitchel Zambrano  Thoracic Surgery  [address]  [place] [address]  Phone: [phone]  Fax: [phone]  Follow Up Time:

## 2023-06-27 NOTE — CHART NOTE - NSCHARTNOTEFT_GEN_A_CORE
CXR post pigtail shows small (decreased) right apical pneumothorax, however this Xray was done on waterseal.  It was placed on suction.  Will check tomorrow am CXR.

## 2023-06-27 NOTE — DISCHARGE NOTE PROVIDER - NSDCFUADDAPPT_GEN_ALL_CORE_FT
- Please follow up with Dr. Rollins (IR) in 1 week from discharge or as directed. Call to schedule an appt.  - Please follow up with Dr. Yuan (GI) in 1 week from discharge or as directed. Call to schedule an appt. If you prefer, you may follow up with your own outpatient gastroenterologist.  Please follow up with Dr. Zambrano (Thoracic Surgery) in 1 week from discharge or as directed. Call to schedule an appt.

## 2023-06-27 NOTE — PROGRESS NOTE ADULT - ASSESSMENT
78yMale w/ PMHx of Crohn's, HTN, HLD, Afib/flutter on amiodarone, GOUT, ileocecectomy here for resolving SBO vs Crohn's flare s/p NGT decompression s/p laparoscopic lysis of adhesions converted to open with SBR x3 and temporary abdominal closure.      Neuro: Sedation-propofol, fentanyl, d/c nimbex   CV: MAP > 65, Hx of afib/flutter s/p DCCV continue amio gtt, hold metoprolol, hx of HLD hold rasovastatin, TTE (04/22) PASP 50 mmHg, EF 69%  Pulm: /15/5/40, R sided pneumothorax increasing in size CT surg to place chest tube and CXR to confirm location  GI: SBO: NGT to LIWS, NPO, hx of crohn's s/p ileocecectomy, open appy, now open abdomen with abthera after TRE, SBR x 3   : mccauley, john, monitor urine output if very concentrated urine will give 500 CC   ID: CTX (6/26-), flagyl 500 mg (6/26-)   Heme: EBL 1.5 L, 3 units pRBC, follow up with lactate levels which are currently trending down from 4.2 to 3.9  Endo: hx of hypothyroidism continue synthroid   Lines: PIV x 2, R TLC (6/26), [place] (6/26)  Wounds: abthera   PT/OT: not ordered  Dispo: SICU, OR Thursday to close abdomen  78yMale w/ PMHx of Crohn's, HTN, HLD, Afib/flutter on amiodarone, GOUT, ileocecectomy here for resolving SBO vs Crohn's flare s/p NGT decompression s/p laparoscopic lysis of adhesions converted to open with SBR x3 and abthera placed.      Neuro: Sedation-propofol, fentanyl, d/c nimbex   CV: hypovolemic shock on levo with goal MAP > 65 will give 500 cc IVF, Hx of afib/flutter s/p DCCV continue amio gtt, hold metoprolol, hx of HLD hold rasovastatin, TTE (04/22) PASP 50 mmHg, EF 69%, continue to trend lactate levels (trending down from 4.2 to 3.9)  Pulm: /15/5/40, R sided pneumothorax increasing in size CT surg to place chest tube and CXR to confirm location  GI: SBO: NGT to LIWS, NPO, hx of Crohn's s/p ileocecectomy, open appy, now open abdomen with abthera after TRE, SBR x 3   : mccauley, john  ID: CTX (6/26-), flagyl 500 mg (6/26-)   Heme: EBL 1.5 L, 3 units pRBC  Endo: hx of hypothyroidism continue synthroid   Lines: PIV x 2, R TLC (6/26), [place] (6/26)  Wounds: abthera   PT/OT: not ordered  Dispo: SICU, OR Thursday to close abdomen

## 2023-06-27 NOTE — PROGRESS NOTE ADULT - SUBJECTIVE AND OBJECTIVE BOX
Interval Events:  Overnight: UOP dropped and patient was given 1L bolus. POCUS done which showed minimal B lines so he was given another 1L bolus. Patient also was found to have a small/moderate pneumothorax on the right side which was found to be stable. He was also started on nimbex. Phenyl was d/c and levo was decreased to 0.3.     Patient seen and examined at bedside. Patient is currently intubated and sedated along with paralytic.       Allergies    penicillin (Angioedema)    Intolerances        Vital Signs Last 24 Hrs  T(C): 36.3 (27 Jun 2023 09:00), Max: 37.9 (26 Jun 2023 13:15)  T(F): 97.3 (27 Jun 2023 09:00), Max: 100.2 (26 Jun 2023 13:15)  HR: 62 (27 Jun 2023 11:00) (53 - 66)  BP: 115/58 (27 Jun 2023 11:00) (105/54 - 168/73)  BP(mean): 84 (27 Jun 2023 11:00) (75 - 105)  RR: 15 (27 Jun 2023 11:00) (12 - 18)  SpO2: 95% (27 Jun 2023 11:00) (94% - 100%)    Parameters below as of 27 Jun 2023 11:00  Patient On (Oxygen Delivery Method): ventilator    O2 Concentration (%): 40    06-26 @ 07:01  -  06-27 @ 07:00  --------------------------------------------------------  IN: 6011.7 mL / OUT: 1210 mL / NET: 4801.7 mL    06-27 @ 07:01 - 06-27 @ 11:34  --------------------------------------------------------  IN: 158.9 mL / OUT: 145 mL / NET: 13.9 mL      06-26 @ 07:01  -  06-27 @ 07:00  --------------------------------------------------------  IN: 6011.7 mL / OUT: 1210 mL / NET: 4801.7 mL    06-27 @ 07:01  - 06-27 @ 11:34  --------------------------------------------------------  IN: 158.9 mL / OUT: 145 mL / NET: 13.9 mL        Physical Exam:     Gen: sedated and intubated on paralytic   Neuro: sedated and intubated on paralytic   CV: RRR Reg s1s2 noM  Pulm: decreased breath sides on the right. No w/r/r  Abd: Soft NT ND + BS  Ext: No C/C/E, cool extremities   Vasc: + DP b/l, normal capillary refill   Skin: no rashes noted  MSK: No joint swelling      LABS:  ABG - ( 27 Jun 2023 10:26 )  pH, Arterial: 7.35  pH, Blood: x     /  pCO2: 37    /  pO2: 82    / HCO3: 20    / Base Excess: -4.7  /  SaO2: 97.1                CBC Full  -  ( 27 Jun 2023 05:30 )  WBC Count : 5.22 K/uL  RBC Count : 3.12 M/uL  Hemoglobin : 9.7 g/dL  Hematocrit : 29.8 %  Platelet Count - Automated : 141 K/uL  Mean Cell Volume : 95.5 fl  Mean Cell Hemoglobin : 31.1 pg  Mean Cell Hemoglobin Concentration : 32.6 gm/dL  Auto Neutrophil # : x  Auto Lymphocyte # : x  Auto Monocyte # : x  Auto Eosinophil # : x  Auto Basophil # : x  Auto Neutrophil % : x  Auto Lymphocyte % : x  Auto Monocyte % : x  Auto Eosinophil % : x  Auto Basophil % : x    06-27    135  |  107  |  20  ----------------------------<  114<H>  4.0   |  20<L>  |  1.24    Ca    7.6<L>      27 Jun 2023 05:30  Phos  3.0     06-27  Mg     1.8     06-27      PT/INR - ( 26 Jun 2023 21:48 )   PT: 15.4 sec;   INR: 1.29          PTT - ( 26 Jun 2023 21:48 )  PTT:27.4 sec      Urinalysis Basic - ( 27 Jun 2023 05:30 )    Color: x / Appearance: x / SG: x / pH: x  Gluc: 114 mg/dL / Ketone: x  / Bili: x / Urobili: x   Blood: x / Protein: x / Nitrite: x   Leuk Esterase: x / RBC: x / WBC x   Sq Epi: x / Non Sq Epi: x / Bacteria: x              RADIOLOGY & ADDITIONAL STUDIES (The following images were personally reviewed):    CXR: 6/27 at 9 am   Impression: Increased right pneumothorax, basilar opacities/left pleural effusion increased Interval Events:  Overnight: UOP dropped and patient was given 1L bolus. POCUS done which showed minimal B lines so he was given another 1L bolus. Patient also was found to have a small/moderate pneumothorax on the right side which was found to be stable. He was also started on nimbex. Phenyl was d/c and levo was weaned to 0.3.     Patient seen and examined at bedside. Patient is currently intubated and sedated along with paralytic.       Allergies    penicillin (Angioedema)    Intolerances        Vital Signs Last 24 Hrs  T(C): 36.3 (27 Jun 2023 09:00), Max: 37.9 (26 Jun 2023 13:15)  T(F): 97.3 (27 Jun 2023 09:00), Max: 100.2 (26 Jun 2023 13:15)  HR: 62 (27 Jun 2023 11:00) (53 - 66)  BP: 115/58 (27 Jun 2023 11:00) (105/54 - 168/73)  BP(mean): 84 (27 Jun 2023 11:00) (75 - 105)  RR: 15 (27 Jun 2023 11:00) (12 - 18)  SpO2: 95% (27 Jun 2023 11:00) (94% - 100%)    Parameters below as of 27 Jun 2023 11:00  Patient On (Oxygen Delivery Method): ventilator    O2 Concentration (%): 40    06-26 @ 07:01  -  06-27 @ 07:00  --------------------------------------------------------  IN: 6011.7 mL / OUT: 1210 mL / NET: 4801.7 mL    06-27 @ 07:01 - 06-27 @ 11:34  --------------------------------------------------------  IN: 158.9 mL / OUT: 145 mL / NET: 13.9 mL      06-26 @ 07:01  -  06-27 @ 07:00  --------------------------------------------------------  IN: 6011.7 mL / OUT: 1210 mL / NET: 4801.7 mL    06-27 @ 07:01  - 06-27 @ 11:34  --------------------------------------------------------  IN: 158.9 mL / OUT: 145 mL / NET: 13.9 mL        Physical Exam:     Gen: sedated and intubated on paralytic   Neuro: sedated and intubated on paralytic   CV: RRR Reg s1s2 noM  Pulm: decreased breath sides on the right. No w/r/r  Abd: midline incision draining serosanguinous fluid   Ext: No C/C/E, cool extremities up to forearms   Vasc: + DP b/l, normal capillary refill   Skin: no rashes noted  MSK: No joint swelling      LABS:  ABG - ( 27 Jun 2023 10:26 )  pH, Arterial: 7.35  pH, Blood: x     /  pCO2: 37    /  pO2: 82    / HCO3: 20    / Base Excess: -4.7  /  SaO2: 97.1                CBC Full  -  ( 27 Jun 2023 05:30 )  WBC Count : 5.22 K/uL  RBC Count : 3.12 M/uL  Hemoglobin : 9.7 g/dL  Hematocrit : 29.8 %  Platelet Count - Automated : 141 K/uL  Mean Cell Volume : 95.5 fl  Mean Cell Hemoglobin : 31.1 pg  Mean Cell Hemoglobin Concentration : 32.6 gm/dL  Auto Neutrophil # : x  Auto Lymphocyte # : x  Auto Monocyte # : x  Auto Eosinophil # : x  Auto Basophil # : x  Auto Neutrophil % : x  Auto Lymphocyte % : x  Auto Monocyte % : x  Auto Eosinophil % : x  Auto Basophil % : x    06-27    135  |  107  |  20  ----------------------------<  114<H>  4.0   |  20<L>  |  1.24    Ca    7.6<L>      27 Jun 2023 05:30  Phos  3.0     06-27  Mg     1.8     06-27      PT/INR - ( 26 Jun 2023 21:48 )   PT: 15.4 sec;   INR: 1.29          PTT - ( 26 Jun 2023 21:48 )  PTT:27.4 sec      Urinalysis Basic - ( 27 Jun 2023 05:30 )    Color: x / Appearance: x / SG: x / pH: x  Gluc: 114 mg/dL / Ketone: x  / Bili: x / Urobili: x   Blood: x / Protein: x / Nitrite: x   Leuk Esterase: x / RBC: x / WBC x   Sq Epi: x / Non Sq Epi: x / Bacteria: x              RADIOLOGY & ADDITIONAL STUDIES (The following images were personally reviewed):    CXR: 6/27 at 9 am   Impression: Increased right pneumothorax, basilar opacities/left pleural effusion increased

## 2023-06-27 NOTE — DISCHARGE NOTE PROVIDER - PROVIDER TOKENS
PROVIDER:[TOKEN:[[id number]]] PROVIDER:[TOKEN:[[id number]]] PROVIDER:[TOKEN:[[id number]]] PROVIDER:[TOKEN:[[id number]]],PROVIDER:[TOKEN:[[id number]]],PROVIDER:[TOKEN:[[id number]]],PROVIDER:[TOKEN:[[id number]]] PROVIDER:[TOKEN:[[id number]]],PROVIDER:[TOKEN:[[id number]]],PROVIDER:[TOKEN:[[id number]]],PROVIDER:[TOKEN:[[id number]]] PROVIDER:[TOKEN:[[id number]]],PROVIDER:[TOKEN:[[id number]]],PROVIDER:[TOKEN:[[id number]]],PROVIDER:[TOKEN:[[id number]]]

## 2023-06-28 NOTE — PROGRESS NOTE ADULT - SUBJECTIVE AND OBJECTIVE BOX
Interval Events:    Overnight: Patient was hypotensive (91/40) via ronni levo was increased with a max of .14. Decision was made that the manual bp cuff was more accurate so levo was titrated back down to 0.06. UOP also went down to 25-30 and he was given a bolus of 500 cc. UOP was good for the rest of the night so no more fluids were given. CVP was 5 at 4 am. After a slight increase to 3.9 lactate has decreased to 3.1.     Patient seen and examined at bedside. Currently intubated and leaving for OR shortly.      Allergies    penicillin (Angioedema)    Intolerances        Vital Signs Last 24 Hrs  T(C): 37 (28 Jun 2023 07:31), Max: 37 (28 Jun 2023 01:15)  T(F): 98.6 (28 Jun 2023 05:07), Max: 98.6 (28 Jun 2023 01:15)  HR: 74 (28 Jun 2023 07:49) (62 - 79)  BP: 102/54 (28 Jun 2023 07:31) (102/54 - 165/72)  BP(mean): 76 (28 Jun 2023 07:31) (75 - 104)  RR: 15 (28 Jun 2023 07:31) (15 - 17)  SpO2: 97% (28 Jun 2023 07:49) (95% - 97%)    Parameters below as of 28 Jun 2023 07:31      O2 Concentration (%): 40    06-27 @ 07:01  -  06-28 @ 07:00  --------------------------------------------------------  IN: 6203.4 mL / OUT: 2200 mL / NET: 4003.4 mL      06-27 @ 07:01  -  06-28 @ 07:00  --------------------------------------------------------  IN: 6203.4 mL / OUT: 2200 mL / NET: 4003.4 mL        Physical Exam:     Gen: intubated and sedated  Neuro: intubated and sedated  CV: RRR Reg s1s2 noM  Pulm: CTA b/l No w/r/r  Abd: +abthera in place  Ext: No C/C +1 edema b/l arms up to elbows    Vasc: + DP b/l       LABS:  ABG - ( 28 Jun 2023 09:04 )  pH, Arterial: 7.33  pH, Blood: x     /  pCO2: 43    /  pO2: 210   / HCO3: 23    / Base Excess: -3.1  /  SaO2: x               CBC Full  -  ( 28 Jun 2023 03:44 )  WBC Count : 18.23 K/uL  RBC Count : 3.08 M/uL  Hemoglobin : 9.4 g/dL  Hematocrit : 29.2 %  Platelet Count - Automated : 161 K/uL  Mean Cell Volume : 94.8 fl  Mean Cell Hemoglobin : 30.5 pg  Mean Cell Hemoglobin Concentration : 32.2 gm/dL  Auto Neutrophil # : x  Auto Lymphocyte # : x  Auto Monocyte # : x  Auto Eosinophil # : x  Auto Basophil # : x  Auto Neutrophil % : x  Auto Lymphocyte % : x  Auto Monocyte % : x  Auto Eosinophil % : x  Auto Basophil % : x    06-28    138  |  106  |  17  ----------------------------<  114<H>  4.5   |  24  |  1.27    Ca    7.2<L>      28 Jun 2023 03:44  Phos  3.3     06-28  Mg     2.0     06-28    TPro  4.7<L>  /  Alb  2.1<L>  /  TBili  3.2<H>  /  DBili  2.8<H>  /  AST  21  /  ALT  10  /  AlkPhos  49  06-28    PT/INR - ( 28 Jun 2023 03:44 )   PT: 14.3 sec;   INR: 1.20          PTT - ( 28 Jun 2023 03:44 )  PTT:30.7 sec      Urinalysis Basic - ( 28 Jun 2023 03:44 )    Color: x / Appearance: x / SG: x / pH: x  Gluc: 114 mg/dL / Ketone: x  / Bili: x / Urobili: x   Blood: x / Protein: x / Nitrite: x   Leuk Esterase: x / RBC: x / WBC x   Sq Epi: x / Non Sq Epi: x / Bacteria: x              RADIOLOGY & ADDITIONAL STUDIES (The following images were personally reviewed):     Interval Events:    Overnight: Patient was hypotensive (91/40) via ronni levo was increased with a max of .14. Decision was made that the manual bp cuff was more accurate so levo was titrated back down to 0.06. UOP also went down to 25-30 and he was given a bolus of 500 cc. UOP was good for the rest of the night so no more fluids were given. CVP was 5 at 4 am. After a slight increase to 3.9 lactate has decreased to 3.1.     Patient seen and examined at bedside. Currently intubated and leaving for OR shortly.      Allergies    penicillin (Angioedema)    Intolerances        Vital Signs Last 24 Hrs  T(C): 37 (28 Jun 2023 07:31), Max: 37 (28 Jun 2023 01:15)  T(F): 98.6 (28 Jun 2023 05:07), Max: 98.6 (28 Jun 2023 01:15)  HR: 74 (28 Jun 2023 07:49) (62 - 79)  BP: 102/54 (28 Jun 2023 07:31) (102/54 - 165/72)  BP(mean): 76 (28 Jun 2023 07:31) (75 - 104)  RR: 15 (28 Jun 2023 07:31) (15 - 17)  SpO2: 97% (28 Jun 2023 07:49) (95% - 97%)    Parameters below as of 28 Jun 2023 07:31      O2 Concentration (%): 40    06-27 @ 07:01  -  06-28 @ 07:00  --------------------------------------------------------  IN: 6203.4 mL / OUT: 2200 mL / NET: 4003.4 mL      06-27 @ 07:01  -  06-28 @ 07:00  --------------------------------------------------------  IN: 6203.4 mL / OUT: 2200 mL / NET: 4003.4 mL        Physical Exam:     Gen: intubated and sedated  Neuro: intubated and sedated  CV: RRR Reg s1s2 noM  Pulm: CTA b/l No w/r/r  Abd: Nontender and nondistended +abthera in place draining serosanguinous fluid  Ext: No C/C +1 edema b/l arms up to elbows    Vasc: + DP b/l       LABS:  ABG - ( 28 Jun 2023 09:04 )  pH, Arterial: 7.33  pH, Blood: x     /  pCO2: 43    /  pO2: 210   / HCO3: 23    / Base Excess: -3.1  /  SaO2: x               CBC Full  -  ( 28 Jun 2023 03:44 )  WBC Count : 18.23 K/uL  RBC Count : 3.08 M/uL  Hemoglobin : 9.4 g/dL  Hematocrit : 29.2 %  Platelet Count - Automated : 161 K/uL  Mean Cell Volume : 94.8 fl  Mean Cell Hemoglobin : 30.5 pg  Mean Cell Hemoglobin Concentration : 32.2 gm/dL  Auto Neutrophil # : x  Auto Lymphocyte # : x  Auto Monocyte # : x  Auto Eosinophil # : x  Auto Basophil # : x  Auto Neutrophil % : x  Auto Lymphocyte % : x  Auto Monocyte % : x  Auto Eosinophil % : x  Auto Basophil % : x    06-28    138  |  106  |  17  ----------------------------<  114<H>  4.5   |  24  |  1.27    Ca    7.2<L>      28 Jun 2023 03:44  Phos  3.3     06-28  Mg     2.0     06-28    TPro  4.7<L>  /  Alb  2.1<L>  /  TBili  3.2<H>  /  DBili  2.8<H>  /  AST  21  /  ALT  10  /  AlkPhos  49  06-28    PT/INR - ( 28 Jun 2023 03:44 )   PT: 14.3 sec;   INR: 1.20          PTT - ( 28 Jun 2023 03:44 )  PTT:30.7 sec      Urinalysis Basic - ( 28 Jun 2023 03:44 )    Color: x / Appearance: x / SG: x / pH: x  Gluc: 114 mg/dL / Ketone: x  / Bili: x / Urobili: x   Blood: x / Protein: x / Nitrite: x   Leuk Esterase: x / RBC: x / WBC x   Sq Epi: x / Non Sq Epi: x / Bacteria: x              RADIOLOGY & ADDITIONAL STUDIES (The following images were personally reviewed):

## 2023-06-28 NOTE — DIETITIAN INITIAL EVALUATION ADULT - ADD RECOMMEND
Initiate nutrition within 24hrs  - If no increase in pressor requirements x 24hrs, MAPs consistently trending >65 mmHg, no lactic acidosis present, GI tract deemed viable: recommend obtaining enteral access & initiating trickle feeds [recommend Vital HP @20ml/hr x 24hrs]  >>monitor propofol infusion & IV D5, adjust nutrition provision prn to prevent over/underfeeding  >>monitor GI function closely  - obtain enteral access & initiate trickle feeds if medically feasible  - If enteral/PO not feasible within 48-72hrs, recommend initiate TPN  - Monitor chemistry, skin integrity, GI function    Pt is high nutrition risk. Will f/u per nutrition protocol, reconsult prn.

## 2023-06-28 NOTE — PROGRESS NOTE ADULT - SUBJECTIVE AND OBJECTIVE BOX
STATUS POST:  Exploratory laparotomy    Laparoscopic lysis of intestinal adhesions    Temporary abdominal wall closure    POST OPERATIVE DAY #: 1    SUBJECTIVE:   Patient seen and examined at bedside by chief during AM rounds. Overnight, patient noted to have a persistent lactic acidosis despite fluid resuscitation. Patient remains intubated and sedated.     Vital Signs Last 24 Hrs  T(C): 37 (28 Jun 2023 07:31), Max: 37 (28 Jun 2023 01:15)  T(F): 98.6 (28 Jun 2023 05:07), Max: 98.6 (28 Jun 2023 01:15)  HR: 74 (28 Jun 2023 16:00) (64 - 79)  BP: 123/58 (28 Jun 2023 16:00) (102/54 - 165/72)  BP(mean): 83 (28 Jun 2023 16:00) (75 - 104)  RR: 15 (28 Jun 2023 16:00) (13 - 18)  SpO2: 99% (28 Jun 2023 16:00) (95% - 99%)    Parameters below as of 28 Jun 2023 16:00  Patient On (Oxygen Delivery Method): ventilator    O2 Concentration (%): 40    I&O's Summary    27 Jun 2023 07:01  -  28 Jun 2023 07:00  --------------------------------------------------------  IN: 6203.4 mL / OUT: 2200 mL / NET: 4003.4 mL    28 Jun 2023 07:01  -  28 Jun 2023 16:06  --------------------------------------------------------  IN: 404.4 mL / OUT: 335 mL / NET: 69.4 mL        Physical Exam:  General: Resting comfortably in bed, NAD  HEENT: ATNC  Pulmonary: Nonlabored breathing, no respiratory distress, no acessory muscle use noted  Cardiovascular: NSR  Abdomen: Soft, nondisteded, appropriate incisional tenderness  Extremities: WWP, SCDs in place, No significant edema appreciated    LABS:                        9.0    11.37 )-----------( 173      ( 28 Jun 2023 13:00 )             27.6     06-28    137  |  106  |  18  ----------------------------<  85  4.6   |  23  |  1.23    Ca    7.6<L>      28 Jun 2023 13:00  Phos  3.2     06-28  Mg     2.0     06-28    TPro  4.4<L>  /  Alb  2.3<L>  /  TBili  3.9<H>  /  DBili  x   /  AST  19  /  ALT  9<L>  /  AlkPhos  54  06-28    PT/INR - ( 28 Jun 2023 13:01 )   PT: 14.5 sec;   INR: 1.22          PTT - ( 28 Jun 2023 13:01 )  PTT:31.5 sec  Urinalysis Basic - ( 28 Jun 2023 13:00 )    Color: x / Appearance: x / SG: x / pH: x  Gluc: 85 mg/dL / Ketone: x  / Bili: x / Urobili: x   Blood: x / Protein: x / Nitrite: x   Leuk Esterase: x / RBC: x / WBC x   Sq Epi: x / Non Sq Epi: x / Bacteria: x        Plan:  -NPO/IVF - LR @ 100  -Antibiotics -  -SQH and SCDs  -OOB as tolerated/IS  -AM labs

## 2023-06-28 NOTE — PROGRESS NOTE ADULT - SUBJECTIVE AND OBJECTIVE BOX
stable pressors and sedation  making adequate urine  VSS  Abd soft  serosang output from VAC  increasing WBC    2nd look planned  consent signed by wife  long discussion held

## 2023-06-28 NOTE — PROGRESS NOTE ADULT - SUBJECTIVE AND OBJECTIVE BOX
s/p op  in ICU sedated  vent  NGT  Rt Chest tube  IV amio  vitals ok HR 70s  abdomen: mild distension, vac, no bowel sounds                          9.0    11.37 )-----------( 173      ( 28 Jun 2023 13:00 )             27.6   Comprehensive Metabolic Panel (06.28.23 @ 13:00)    Sodium: 137 mmol/L   Potassium: 4.6 mmol/L   Chloride: 106 mmol/L   Carbon Dioxide: 23 mmol/L   Anion Gap: 8 mmol/L   Blood Urea Nitrogen: 18 mg/dL   Creatinine: 1.23 mg/dL   Glucose: 85 mg/dL   Calcium: 7.6 mg/dL   Protein Total: 4.4 g/dL   Albumin: 2.3 g/dL   Bilirubin Total: 3.9 mg/dL   Alkaline Phosphatase: 54 U/L   Aspartate Aminotransferase (AST/SGOT): 19 U/L   Alanine Aminotransferase (ALT/SGPT): 9 U/L   eGFR: 60: The estimated glomerular filtration rate (eGFR) is calculated using the  2021 CKD-EPI creatinine equation, which does not have a coefficient for  race and is validated in individuals 18 years of age and older (N Engl J  Med 2021; 385:2237-0978). Creatinine-based eGFR may be inaccurate in  various situations including but not limited to extremes of muscle mass,  altered dietary protein intake, or medications that affect renal tubular  creatinine secretion. mL/min/1.73m2

## 2023-06-28 NOTE — DIETITIAN INITIAL EVALUATION ADULT - WEIGHT (KG)
PT LIVES WITH HER BOYFRIEND, NO DME OR HHC NEEDS, USES [facility] FOR RX NEEDS, DCP IS TO HOME WHEN STABLE WITH PSYCHIATRIC F/U OUTPT  DASH discussion completed  Discussed goals of making sure pt's  needs are met upon discharge, pt's preferences are taken into account, pt understands health condition, medications and symptoms to watch for after returning home and pt is aware of any follow up appointments recommended by hospital physician  80.7 86.1

## 2023-06-28 NOTE — BRIEF OPERATIVE NOTE - COMMENTS
Patient has chest tube, abdominal wound VAC , mccauley catheter, and will remain intubated until cleared for extubation.

## 2023-06-28 NOTE — PROGRESS NOTE ADULT - ASSESSMENT
Assessment:   79 YO Male w/ PMHx of Crohn's, HTN, HLD, Afib/flutter (on amiodarone), gout, ileocecectomy who was admitted with resolving SBO vs Crohn's flare s/p NGT decompression s/p laparoscopic lysis of adhesions converted to open with SBR x3 and abthera placed. Thoracic surgery consulted on 6/27/23 for R PTX 2/2 R IJ central line. R pigtail placed at bedside with improved PTX. On 6/28/23 patient RTOR with general surgery for abdominal closure, small bowel anastomosis and mikel-ileocolic resection. Patient remains intubated and sedated in CCU.     Plan:  Problem 1: R Pneumothorax.    -2/2 R IJ central line  -s/p R pigtail placed on 6/27/23. Improved PTX on repeat CXR  -Keep pigtail on suction  -Daily CXR  -Thoracic surgery team will continue to follow    Problem 2: Hx of HTN  -On pressors  -Care per primary team    Problem 3: Hx of A-Fib/Flutter  -On amiodarone  -Care per primary team    I have reviewed clinical labs tests and reports, radiology tests and reports, as well as old patient medical records, and discussed with the refering physician.

## 2023-06-28 NOTE — DIETITIAN INITIAL EVALUATION ADULT - OTHER INFO
78 year old male with a PMHx of Crohn's, Afib/flutter s/p DCCVs on amiodarone, GOUT, remote ileocectomy and open appy here for resolving SBO vs Crohn's flare s/p NGT decompression, now s/p laproscopic lysis of adhesions converted to open lysis of adhesions, SBR x 3 and left with abthera in place.     Chart reviewed. Discussed with IDT. Pt back to OR today for ileocolic resection with anastomosis, small bowel anastomosis, abd wall reconstruction. Afebrile. HR WNL. BP trending low, MAPs >65mmHg, on 1 pressor. Pt currently intubated on AC/CMV, sedated on fentanyl & propofol [current rate provides 477.84 kcals]. On IV D5 + LR [provides 144g dextrose/489.6kcals] & IV LR bolus. I&Os x 24hrs: 6203.4 [1540ml IV D5 +LR + 3320ml IV LR + 380.9ml propofol] / 2200 [700ml NGT output + 950ml uop + 550ml wound vac]= +4003.4ml net. Labs- POC BG trending  mg/dL x 24hrs. Lactate 3.6H. ABGs: pH 7.33L, base excess -3.7L. Recommend to initiate nutrition as soon as medically feasible. RDN will continue to monitor, reassess, and intervene as appropriate.     Pain: No nonverbal indicators of pain present  Skin: chest tube, abd open  GI: abd open with wound vac

## 2023-06-28 NOTE — DIETITIAN INITIAL EVALUATION ADULT - NSFNSGIIOFT_GEN_A_CORE
06-27-23 @ 07:01  -  06-28-23 @ 07:00  --------------------------------------------------------  OUT:    Chest Tube (mL): 0 mL    Nasogastric/Oral tube (mL): 700 mL  Total OUT: 700 mL    Total NET: -700 mL

## 2023-06-28 NOTE — PROGRESS NOTE ADULT - SUBJECTIVE AND OBJECTIVE BOX
On vent BP low on pressor Afeb In RR Good BS ant Good distal pulses WBCs increasing Lactic acid still up  UO adequate

## 2023-06-28 NOTE — PROGRESS NOTE ADULT - ASSESSMENT
78 year old male with a PMHx of Crohn's, Afib/flutter s/p DCCVs on amiodarone, GOUT, remote ileocectomy and open appy here for resolving SBO vs Crohn's flare s/p NGT decompression, now s/p laproscopic lysis of adhesions converted to open lysis of adhesions, SBR x 3 and left with abthera in place.     Neuro: sedation (prop, fentanyl), hx anxiety hold venlafaxine, ETOH hx   CV: Hypovolemic shock MAP goal >65 on levo 0.06, hx of afib/flutter s/p DCCV, amio gtt cont, hold metoprool, hx of HLD hold rasovastatin, TTE (4/22)--EF 69%, trending lactate currently back to 3.1 after elevation to 3.9   Pulm: /15/5/40, R PTX s/p CTx placement 6/27 improved on x-ray 6/28  GI: SBO, GT to LIWS, NPO hx of Crohn's s/p ileocectomy, open appy, now open abdomen with abthera after TRE, SBR x3   : Poole, ELVI  ID: CTX (6/26-) and Flagyl (6/26-)  Heme: EBL 1.5L, 3 unites pRBC  ENdo: hx of hypothyroid cont synthroid, Dextrose 5%+LR for low BG   PPX: SCD, SQH  Lines: PIVx2, R TLC (6/26), Victoria (6/26)  Wounds: abthera   PT/OT: not ordered  Dispo: SICU, OR today for closure

## 2023-06-28 NOTE — PROGRESS NOTE ADULT - ASSESSMENT
78M w/ Crohn's, AFib/Flutter s/p DCCVs, remote ileocectomy and open appy here for resolving SBO vs Crohns flare, s/p NGT decompression. Now s/p laparoscopic lysis of adhesions, converted to open lysis of adhesions, SBR x 3 and left in discontinuity, abthera vac placement, 5L cryst, 1L 5% alb, 3 pRBC, 1 FFP, 1 plts.    Plan for RTOR today for anastomosis and abdominal wall closure  Trend lactate  Rest of care per SICU

## 2023-06-28 NOTE — DIETITIAN INITIAL EVALUATION ADULT - NSICDXPASTMEDICALHX_GEN_ALL_CORE_FT
PAST MEDICAL HISTORY:  Atrial fibrillation s/p cardioversion 2010 and 2014  Pt. reports 4 DCCV  Now on Amiodarone 200mg PO bid and Eliquis 5mg PO bid  Last DCCV 4 yrs ago at [facility]    Crohn's disease s/p partial resection of ileum    Essential hypertension Hypertension    History of depression On Venlafaxine ER 150mg PO bid    Hyperlipidemia     Hypothyroidism

## 2023-06-28 NOTE — DIETITIAN INITIAL EVALUATION ADULT - PERTINENT LABORATORY DATA
06-28    137  |  106  |  18  ----------------------------<  85  4.6   |  23  |  1.23    Ca    7.6<L>      28 Jun 2023 13:00  Phos  3.2     06-28  Mg     2.0     06-28    TPro  4.4<L>  /  Alb  2.3<L>  /  TBili  3.9<H>  /  DBili  x   /  AST  19  /  ALT  9<L>  /  AlkPhos  54  06-28  POCT Blood Glucose.: 123 mg/dL (06-28-23 @ 14:29)  A1C with Estimated Average Glucose Result: 4.8 % (06-27-23 @ 05:30)

## 2023-06-28 NOTE — DIETITIAN INITIAL EVALUATION ADULT - OTHER CALCULATIONS
Ideal body weight (80.7kg) used for calculations as pt >120% of IBW. Needs estimated for age and adjusted for current clinical status, increased needs 2/2 post-op invasive surgery, open wounds Ideal body weight (86.3kg) used for calculations as pt >120% of IBW. Needs estimated for age and adjusted for current clinical status, increased needs 2/2 post-op invasive surgery, open wounds Ideal body weight (86.3kg) used for calculations as pt >100% of IBW & BMI <30 per [facility] Standards of Care. Needs estimated for age and adjusted for current clinical status, increased needs 2/2 post-op invasive surgery, open wounds

## 2023-06-28 NOTE — DIETITIAN INITIAL EVALUATION ADULT - NUTRITIONGOAL OUTCOME1
Initiate nutrition within 24hrs  Pt will meet at least 75% of protein & energy needs via most appropriate route for nutrition

## 2023-06-28 NOTE — PROGRESS NOTE ADULT - SUBJECTIVE AND OBJECTIVE BOX
Operation / Date: s/p R pigtail on 6/27/23    SUBJECTIVE ASSESSMENT: Seen and examined at bedside. Unable to obtain ROS 2/2 intubated and sedated.     Vital Signs Last 24 Hrs  T(C): 36.7 (28 Jun 2023 17:55), Max: 37 (28 Jun 2023 01:15)  T(F): 98 (28 Jun 2023 17:55), Max: 98.6 (28 Jun 2023 01:15)  HR: 74 (28 Jun 2023 20:00) (71 - 79)  BP: 100/55 (28 Jun 2023 20:00) (100/55 - 165/72)  BP(mean): 75 (28 Jun 2023 20:00) (75 - 104)  RR: 15 (28 Jun 2023 20:00) (13 - 18)  SpO2: 100% (28 Jun 2023 20:00) (95% - 100%)    Parameters below as of 28 Jun 2023 20:00  Patient On (Oxygen Delivery Method): ventilator    O2 Concentration (%): 40  I&O's Detail    27 Jun 2023 07:01  -  28 Jun 2023 07:00  --------------------------------------------------------  IN:    Amiodarone: 400.8 mL    Cisatracurium: 8.7 mL    dextrose 5% + lactated ringers: 1540 mL    Enteral Tube Flush: 60 mL    FentaNYL: 141.4 mL    Lactated Ringers: 1320 mL    Lactated Ringers Bolus: 2000 mL    Norepinephrine: 351.6 mL    Propofol: 380.9 mL  Total IN: 6203.4 mL    OUT:    Chest Tube (mL): 0 mL    Indwelling Catheter - Urethral (mL): 950 mL    Nasogastric/Oral tube (mL): 700 mL    VAC (Vacuum Assisted Closure) System (mL): 550 mL  Total OUT: 2200 mL    Total NET: 4003.4 mL    28 Jun 2023 07:01  -  28 Jun 2023 21:04  --------------------------------------------------------  IN:    Amiodarone: 133.6 mL    dextrose 5% + lactated ringers: 960 mL    FentaNYL: 40.8 mL    Norepinephrine: 51.3 mL    Propofol: 101.2 mL  Total IN: 1286.9 mL    OUT:    Chest Tube (mL): 25 mL    Indwelling Catheter - Urethral (mL): 505 mL    Nasogastric/Oral tube (mL): 50 mL    VAC (Vacuum Assisted Closure) System (mL): 20 mL  Total OUT: 600 mL    Total NET: 686.9 mL    CHEST TUBE x 1 on suction  RAFA DRAIN:    EPICARDIAL WIRES:   TIE DOWNS:   ISRAEL: Yes    PHYSICAL EXAM:  *****    LABS:                        8.5    11.42 )-----------( 146      ( 28 Jun 2023 17:54 )             26.6     PT/INR - ( 28 Jun 2023 13:01 )   PT: 14.5 sec;   INR: 1.22        PTT - ( 28 Jun 2023 13:01 )  PTT:31.5 sec    06-28    137  |  106  |  18  ----------------------------<  85  4.6   |  23  |  1.23    Ca    7.6<L>      28 Jun 2023 13:00  Phos  3.2     06-28  Mg     2.0     06-28    TPro  4.4<L>  /  Alb  2.3<L>  /  TBili  3.9<H>  /  DBili  x   /  AST  19  /  ALT  9<L>  /  AlkPhos  54  06-28    Urinalysis Basic - ( 28 Jun 2023 13:00 )    Color: x / Appearance: x / SG: x / pH: x  Gluc: 85 mg/dL / Ketone: x  / Bili: x / Urobili: x   Blood: x / Protein: x / Nitrite: x   Leuk Esterase: x / RBC: x / WBC x   Sq Epi: x / Non Sq Epi: x / Bacteria: x    MEDICATIONS  (STANDING):  aMIOdarone Infusion 0.501 mG/Min (16.7 mL/Hr) IV Continuous <Continuous>  cefTRIAXone   IVPB 1000 milliGRAM(s) IV Intermittent every 24 hours  chlorhexidine 0.12% Liquid 15 milliLiter(s) Oral Mucosa every 12 hours  dextrose 5% + lactated ringers. 1000 milliLiter(s) (120 mL/Hr) IV Continuous <Continuous>  dextrose 5%. 1000 milliLiter(s) (50 mL/Hr) IV Continuous <Continuous>  dextrose 50% Injectable 25 Gram(s) IV Push once  fentaNYL   Infusion. 0.5 MICROgram(s)/kG/Hr (5.05 mL/Hr) IV Continuous <Continuous>  glucagon  Injectable 1 milliGRAM(s) IntraMuscular once  heparin   Injectable 5000 Unit(s) SubCutaneous every 8 hours  insulin lispro (ADMELOG) corrective regimen sliding scale   SubCutaneous every 6 hours  metroNIDAZOLE  IVPB 500 milliGRAM(s) IV Intermittent every 8 hours  norepinephrine Infusion 0.056 MICROgram(s)/kG/Min (10.6 mL/Hr) IV Continuous <Continuous>  pantoprazole  Injectable 40 milliGRAM(s) IV Push daily  propofol Infusion 10 MICROgram(s)/kG/Min (6.06 mL/Hr) IV Continuous <Continuous>    MEDICATIONS  (PRN):  dextrose Oral Gel 15 Gram(s) Oral once PRN Blood Glucose LESS THAN 70 milliGRAM(s)/deciliter    RADIOLOGY & ADDITIONAL TESTS:  < from: Xray Chest 1 View-PORTABLE IMMEDIATE (Xray Chest 1 View-PORTABLE IMMEDIATE .) (06.28.23 @ 13:27) >  FINDINGS:  Lines and Devices: Enteric tube with distal tip curling in the left upper   quadrant. Endotracheal tube tip just above the clavicular heads. Right IJ   central venous line, unchanged. Pigtail chest tube entering the right mid   chest. Implantable loop recorder overlying the left midlung zone.    Mediastinum: Normal cardiomediastinal silhouette.    Lungs: Indistinct opacities associated with suture material at the right   lower lung zone, left upper lung zone, and left hilum, unchanged. New   small left pleural effusion. No pneumothorax.    Bones/Soft Tissues: No significant change in the surrounding soft tissues   or osseous structures.    IMPRESSION:    1. Distal tip of endotracheal tube is above the clavicular heads.   Recommend advancement of the tube by about 3.5 cm.  2. Enteric tube appears to be in the appropriate position.  3. New small left pleural effusion.         Operation / Date: s/p R pigtail on 6/27/23    SUBJECTIVE ASSESSMENT: Seen and examined at bedside. Unable to obtain ROS 2/2 intubated and sedated.     Vital Signs Last 24 Hrs  T(C): 36.7 (28 Jun 2023 17:55), Max: 37 (28 Jun 2023 01:15)  T(F): 98 (28 Jun 2023 17:55), Max: 98.6 (28 Jun 2023 01:15)  HR: 74 (28 Jun 2023 20:00) (71 - 79)  BP: 100/55 (28 Jun 2023 20:00) (100/55 - 165/72)  BP(mean): 75 (28 Jun 2023 20:00) (75 - 104)  RR: 15 (28 Jun 2023 20:00) (13 - 18)  SpO2: 100% (28 Jun 2023 20:00) (95% - 100%)    Parameters below as of 28 Jun 2023 20:00  Patient On (Oxygen Delivery Method): ventilator    O2 Concentration (%): 40  I&O's Detail    27 Jun 2023 07:01  -  28 Jun 2023 07:00  --------------------------------------------------------  IN:    Amiodarone: 400.8 mL    Cisatracurium: 8.7 mL    dextrose 5% + lactated ringers: 1540 mL    Enteral Tube Flush: 60 mL    FentaNYL: 141.4 mL    Lactated Ringers: 1320 mL    Lactated Ringers Bolus: 2000 mL    Norepinephrine: 351.6 mL    Propofol: 380.9 mL  Total IN: 6203.4 mL    OUT:    Chest Tube (mL): 0 mL    Indwelling Catheter - Urethral (mL): 950 mL    Nasogastric/Oral tube (mL): 700 mL    VAC (Vacuum Assisted Closure) System (mL): 550 mL  Total OUT: 2200 mL    Total NET: 4003.4 mL    28 Jun 2023 07:01  -  28 Jun 2023 21:04  --------------------------------------------------------  IN:    Amiodarone: 133.6 mL    dextrose 5% + lactated ringers: 960 mL    FentaNYL: 40.8 mL    Norepinephrine: 51.3 mL    Propofol: 101.2 mL  Total IN: 1286.9 mL    OUT:    Chest Tube (mL): 25 mL    Indwelling Catheter - Urethral (mL): 505 mL    Nasogastric/Oral tube (mL): 50 mL    VAC (Vacuum Assisted Closure) System (mL): 20 mL  Total OUT: 600 mL    Total NET: 686.9 mL    CHEST TUBE x 1 on suction  ISRAEL: Yes    PHYSICAL EXAM:  GENERAL: intubated and sedated in ICU bed  HEAD:  Atraumatic, Normocephalic  ENT: Moist mucous membranes  NECK: Supple, No JVD  CHEST/LUNG: R pigtail x 1 on suction  HEART: RRR  NERVOUS SYSTEM: Intubated and sedated     LABS:                        8.5    11.42 )-----------( 146      ( 28 Jun 2023 17:54 )             26.6     PT/INR - ( 28 Jun 2023 13:01 )   PT: 14.5 sec;   INR: 1.22        PTT - ( 28 Jun 2023 13:01 )  PTT:31.5 sec    06-28    137  |  106  |  18  ----------------------------<  85  4.6   |  23  |  1.23    Ca    7.6<L>      28 Jun 2023 13:00  Phos  3.2     06-28  Mg     2.0     06-28    TPro  4.4<L>  /  Alb  2.3<L>  /  TBili  3.9<H>  /  DBili  x   /  AST  19  /  ALT  9<L>  /  AlkPhos  54  06-28    Urinalysis Basic - ( 28 Jun 2023 13:00 )    Color: x / Appearance: x / SG: x / pH: x  Gluc: 85 mg/dL / Ketone: x  / Bili: x / Urobili: x   Blood: x / Protein: x / Nitrite: x   Leuk Esterase: x / RBC: x / WBC x   Sq Epi: x / Non Sq Epi: x / Bacteria: x    MEDICATIONS  (STANDING):  aMIOdarone Infusion 0.501 mG/Min (16.7 mL/Hr) IV Continuous <Continuous>  cefTRIAXone   IVPB 1000 milliGRAM(s) IV Intermittent every 24 hours  chlorhexidine 0.12% Liquid 15 milliLiter(s) Oral Mucosa every 12 hours  dextrose 5% + lactated ringers. 1000 milliLiter(s) (120 mL/Hr) IV Continuous <Continuous>  dextrose 5%. 1000 milliLiter(s) (50 mL/Hr) IV Continuous <Continuous>  dextrose 50% Injectable 25 Gram(s) IV Push once  fentaNYL   Infusion. 0.5 MICROgram(s)/kG/Hr (5.05 mL/Hr) IV Continuous <Continuous>  glucagon  Injectable 1 milliGRAM(s) IntraMuscular once  heparin   Injectable 5000 Unit(s) SubCutaneous every 8 hours  insulin lispro (ADMELOG) corrective regimen sliding scale   SubCutaneous every 6 hours  metroNIDAZOLE  IVPB 500 milliGRAM(s) IV Intermittent every 8 hours  norepinephrine Infusion 0.056 MICROgram(s)/kG/Min (10.6 mL/Hr) IV Continuous <Continuous>  pantoprazole  Injectable 40 milliGRAM(s) IV Push daily  propofol Infusion 10 MICROgram(s)/kG/Min (6.06 mL/Hr) IV Continuous <Continuous>    MEDICATIONS  (PRN):  dextrose Oral Gel 15 Gram(s) Oral once PRN Blood Glucose LESS THAN 70 milliGRAM(s)/deciliter    RADIOLOGY & ADDITIONAL TESTS:  < from: Xray Chest 1 View-PORTABLE IMMEDIATE (Xray Chest 1 View-PORTABLE IMMEDIATE .) (06.28.23 @ 13:27) >  FINDINGS:  Lines and Devices: Enteric tube with distal tip curling in the left upper   quadrant. Endotracheal tube tip just above the clavicular heads. Right IJ   central venous line, unchanged. Pigtail chest tube entering the right mid   chest. Implantable loop recorder overlying the left midlung zone.    Mediastinum: Normal cardiomediastinal silhouette.    Lungs: Indistinct opacities associated with suture material at the right   lower lung zone, left upper lung zone, and left hilum, unchanged. New   small left pleural effusion. No pneumothorax.    Bones/Soft Tissues: No significant change in the surrounding soft tissues   or osseous structures.    IMPRESSION:    1. Distal tip of endotracheal tube is above the clavicular heads.   Recommend advancement of the tube by about 3.5 cm.  2. Enteric tube appears to be in the appropriate position.  3. New small left pleural effusion.

## 2023-06-28 NOTE — DIETITIAN INITIAL EVALUATION ADULT - PERTINENT MEDS FT
MEDICATIONS  (STANDING):  aMIOdarone Infusion 0.501 mG/Min (16.7 mL/Hr) IV Continuous <Continuous>  cefTRIAXone   IVPB 1000 milliGRAM(s) IV Intermittent every 24 hours  chlorhexidine 0.12% Liquid 15 milliLiter(s) Oral Mucosa every 12 hours  dextrose 5% + lactated ringers. 1000 milliLiter(s) (120 mL/Hr) IV Continuous <Continuous>  dextrose 5%. 1000 milliLiter(s) (50 mL/Hr) IV Continuous <Continuous>  dextrose 50% Injectable 25 Gram(s) IV Push once  fentaNYL   Infusion. 0.5 MICROgram(s)/kG/Hr (5.05 mL/Hr) IV Continuous <Continuous>  glucagon  Injectable 1 milliGRAM(s) IntraMuscular once  heparin   Injectable 5000 Unit(s) SubCutaneous every 8 hours  insulin lispro (ADMELOG) corrective regimen sliding scale   SubCutaneous every 6 hours  lactated ringers Bolus 1000 milliLiter(s) IV Bolus once  metroNIDAZOLE  IVPB 500 milliGRAM(s) IV Intermittent every 8 hours  norepinephrine Infusion 0.056 MICROgram(s)/kG/Min (10.6 mL/Hr) IV Continuous <Continuous>  pantoprazole  Injectable 40 milliGRAM(s) IV Push daily  propofol Infusion 10 MICROgram(s)/kG/Min (6.06 mL/Hr) IV Continuous <Continuous>    MEDICATIONS  (PRN):  dextrose Oral Gel 15 Gram(s) Oral once PRN Blood Glucose LESS THAN 70 milliGRAM(s)/deciliter

## 2023-06-29 NOTE — PROGRESS NOTE ADULT - ASSESSMENT
78M w/ Crohn's, AFib/Flutter s/p DCCVs, remote ileocectomy and open appy here for resolving SBO vs Crohns flare, s/p NGT decompression. Now s/p laparoscopic lysis of adhesions, converted to open lysis of adhesions, SBR x 3 and left in discontinuity, abthera vac placement,  RTOR on 6/28 for abdominal closure, small bowel anastamosis and neoileocolic.    Recommendations:  Extubation per SICU  TPN  Continue abdominal binder  Wound vac  Goal euvolemic volume status  SICU level care  Surgery Team 4C following

## 2023-06-29 NOTE — PROGRESS NOTE ADULT - SUBJECTIVE AND OBJECTIVE BOX
Has been weaned off pressors  Blood pressure stable  Vital signs stable  Abdomen soft  Sero-sanguinous output from the VAC  Adequate urine output  Stable hemoglobin and hematocrit    Start TPN  Nothing by mouth/NG tube  Wean sedation  Wean to extubate as tolerated  Continue IV antibiotics  Follow-up operative cultures

## 2023-06-29 NOTE — PROGRESS NOTE ADULT - SUBJECTIVE AND OBJECTIVE BOX
Pt seen and examined   intubated sedated    REVIEW OF SYSTEMS:  unable      MEDICATIONS:  MEDICATIONS  (STANDING):  albuterol/ipratropium for Nebulization 3 milliLiter(s) Nebulizer every 6 hours  aMIOdarone Infusion 0.501 mG/Min (16.7 mL/Hr) IV Continuous <Continuous>  cefTRIAXone   IVPB 2000 milliGRAM(s) IV Intermittent every 24 hours  chlorhexidine 2% Cloths 1 Application(s) Topical daily  dextrose 5%. 1000 milliLiter(s) (50 mL/Hr) IV Continuous <Continuous>  dextrose 50% Injectable 25 Gram(s) IV Push once  glucagon  Injectable 1 milliGRAM(s) IntraMuscular once  heparin   Injectable 5000 Unit(s) SubCutaneous every 8 hours  insulin lispro (ADMELOG) corrective regimen sliding scale   SubCutaneous every 6 hours  lipid, fat emulsion (Fish Oil and Plant Based) 20% Infusion 0.49 Gm/kG/Day (20.83 mL/Hr) IV Continuous <Continuous>  metroNIDAZOLE  IVPB 500 milliGRAM(s) IV Intermittent every 8 hours  pantoprazole  Injectable 40 milliGRAM(s) IV Push daily  Parenteral Nutrition - Adult 1 Each (71 mL/Hr) TPN Continuous <Continuous>  Parenteral Nutrition - Adult 1 Each (71 mL/Hr) TPN Continuous <Continuous>    MEDICATIONS  (PRN):  dextrose Oral Gel 15 Gram(s) Oral once PRN Blood Glucose LESS THAN 70 milliGRAM(s)/deciliter  hydrALAZINE Injectable 10 milliGRAM(s) IV Push every 8 hours PRN SBP> 160  HYDROmorphone  Injectable 0.2 milliGRAM(s) IV Push every 4 hours PRN Moderate Pain (4 - 6)  HYDROmorphone  Injectable 0.5 milliGRAM(s) IV Push every 4 hours PRN Severe Pain (7 - 10)      Allergies    penicillin (Angioedema)    Intolerances        Vital Signs Last 24 Hrs  T(C): 36.4 (30 Jun 2023 09:00), Max: 38.4 (29 Jun 2023 23:00)  T(F): 97.6 (30 Jun 2023 09:00), Max: 101.1 (29 Jun 2023 23:00)  HR: 70 (30 Jun 2023 11:00) (67 - 97)  BP: 189/77 (30 Jun 2023 11:00) (114/57 - 196/79)  BP(mean): 111 (30 Jun 2023 11:00) (82 - 130)  RR: 21 (30 Jun 2023 11:00) (11 - 24)  SpO2: 96% (30 Jun 2023 11:00) (92% - 99%)    Parameters below as of 30 Jun 2023 11:00  Patient On (Oxygen Delivery Method): nasal cannula, high flow  O2 Flow (L/min): 40  O2 Concentration (%): 50    06-29 @ 07:01  -  06-30 @ 07:00  --------------------------------------------------------  IN: 3360.7 mL / OUT: 4103 mL / NET: -742.3 mL    06-30 @ 07:01  -  06-30 @ 12:12  --------------------------------------------------------  IN: 563.5 mL / OUT: 1792 mL / NET: -1228.5 mL        PHYSICAL EXAM:    General:  in no acute distress  HEENT: MMM, conjunctiva and sclera clear  Gastrointestinal: Soft non-tender non-distended; vac -BS  Skin: Warm and dry. No obvious rash    LABS:  ABG - ( 30 Jun 2023 05:41 )  pH, Arterial: 7.42  pH, Blood: x     /  pCO2: 43    /  pO2: 81    / HCO3: 28    / Base Excess: 2.9   /  SaO2: 97.8                CBC Full  -  ( 30 Jun 2023 11:22 )  WBC Count : 15.72 K/uL  RBC Count : 2.66 M/uL  Hemoglobin : 8.1 g/dL  Hematocrit : 25.4 %  Platelet Count - Automated : 161 K/uL  Mean Cell Volume : 95.5 fl  Mean Cell Hemoglobin : 30.5 pg  Mean Cell Hemoglobin Concentration : 31.9 gm/dL  Auto Neutrophil # : x  Auto Lymphocyte # : x  Auto Monocyte # : x  Auto Eosinophil # : x  Auto Basophil # : x  Auto Neutrophil % : x  Auto Lymphocyte % : x  Auto Monocyte % : x  Auto Eosinophil % : x  Auto Basophil % : x    06-30    140  |  107  |  21  ----------------------------<  122<H>  3.6   |  28  |  1.17    Ca    7.1<L>      30 Jun 2023 05:30  Phos  2.2     06-30  Mg     2.4     06-30    TPro  4.4<L>  /  Alb  2.3<L>  /  TBili  3.9<H>  /  DBili  x   /  AST  19  /  ALT  9<L>  /  AlkPhos  54  06-28    PT/INR - ( 28 Jun 2023 13:01 )   PT: 14.5 sec;   INR: 1.22          PTT - ( 28 Jun 2023 13:01 )  PTT:31.5 sec      Urinalysis Basic - ( 30 Jun 2023 05:30 )    Color: x / Appearance: x / SG: x / pH: x  Gluc: 122 mg/dL / Ketone: x  / Bili: x / Urobili: x   Blood: x / Protein: x / Nitrite: x   Leuk Esterase: x / RBC: x / WBC x   Sq Epi: x / Non Sq Epi: x / Bacteria: x                RADIOLOGY & ADDITIONAL STUDIES (The following images were personally reviewed):

## 2023-06-29 NOTE — PROGRESS NOTE ADULT - ASSESSMENT
78M w/ Crohn's, AFib/Flutter s/p DCCVs, remote ileocectomy and open appy here for resolving SBO vs Crohns flare, s/p NGT decompression. Now s/p laparoscopic lysis of adhesions, converted to open lysis of adhesions, SBR x 3 and left in discontinuity, abthera vac placement,  RTOR on 6/28 for abdominal closure, small bowel anastamosis and neoileocolic.    Neuro: [place]: propofol, fentanyl; zofran prn for nausea; Hx anxiety: hold venlafaxine ETOH Hx:  CV: MAP goal > 65 - levo; Hx of Afib/flutter: s/p DCCV, amio gtt cont, hold metop; Hx of HLD: hold rasovastatin; TTE (04/22) - PASP 50mmHg, EF 69%  Pulm: /15/5/40; R PTX : s/p CTx placement 6/27 to [facility]  GI: SBO: NGT to [facility]; NPO; Hx of Crohn's: s/p ileocectomy, open appy; now open abdomen w/ abthera, after TRE, SBR x3. RTOR 6/28 for mikel-ileocolic resection and small bowel anastamosis, bridging strattice mesh Abdominal binder at all times  : [place], ELVI  ID: [id number] (6/26-), Flagyl (6/26-)  Heme: EBL 1.5L, 3 units pRBC  Endo: ISS; Hx of hypothyroid: cont synthroid, D10 for low BGL @30  PPx: SCD, SQH   Lines: PIV x 2, R TLC (06/26), [place] (6/26-)  Wounds: abthera  PT/OT:  not ordered  Dispo: SICU

## 2023-06-29 NOTE — PROGRESS NOTE ADULT - SUBJECTIVE AND OBJECTIVE BOX
General Surgery Progress Note    SUBJECTIVE:   Patient seen and examined at bedside by shashi during AM rounds.    Vital Signs Last 24 Hrs  T(C): 37.8 (29 Jun 2023 18:05), Max: 37.9 (29 Jun 2023 13:10)  T(F): 100.1 (29 Jun 2023 18:05), Max: 100.2 (29 Jun 2023 13:10)  HR: 89 (29 Jun 2023 18:00) (71 - 97)  BP: 146/66 (29 Jun 2023 18:00) (91/50 - 174/107)  BP(mean): 95 (29 Jun 2023 18:00) (67 - 135)  RR: 14 (29 Jun 2023 18:00) (11 - 26)  SpO2: 93% (29 Jun 2023 18:00) (92% - 100%)    Parameters below as of 29 Jun 2023 19:00  Patient On (Oxygen Delivery Method): nasal cannula, high flow  O2 Flow (L/min): 40  O2 Concentration (%): 40    I&O's Summary    28 Jun 2023 07:01  -  29 Jun 2023 07:00  --------------------------------------------------------  IN: 3731.1 mL / OUT: 1358 mL / NET: 2373.1 mL    29 Jun 2023 07:01  -  29 Jun 2023 18:13  --------------------------------------------------------  IN: 1659.5 mL / OUT: 1383 mL / NET: 276.5 mL        Physical Exam:  General: Resting comfortably in bed, NAD  HEENT: ATNC  Pulmonary: Nonlabored breathing, no respiratory distress, no acessory muscle use noted  Cardiovascular: NSR  Abdomen: Soft, nondisteded, appropriate incisional tenderness  Extremities: WWP, SCDs in place, No significant edema appreciated    LABS:                        8.4    10.16 )-----------( 132      ( 29 Jun 2023 05:30 )             26.4     06-29    137  |  108  |  19  ----------------------------<  103<H>  3.9   |  25  |  1.19    Ca    7.1<L>      29 Jun 2023 05:30  Phos  1.9     06-29  Mg     2.1     06-29    TPro  4.4<L>  /  Alb  2.3<L>  /  TBili  3.9<H>  /  DBili  x   /  AST  19  /  ALT  9<L>  /  AlkPhos  54  06-28    PT/INR - ( 28 Jun 2023 13:01 )   PT: 14.5 sec;   INR: 1.22          PTT - ( 28 Jun 2023 13:01 )  PTT:31.5 sec  Urinalysis Basic - ( 29 Jun 2023 05:30 )    Color: x / Appearance: x / SG: x / pH: x  Gluc: 103 mg/dL / Ketone: x  / Bili: x / Urobili: x   Blood: x / Protein: x / Nitrite: x   Leuk Esterase: x / RBC: x / WBC x   Sq Epi: x / Non Sq Epi: x / Bacteria: x        Plan:  -NPO/IVF - LR @ 100  -Antibiotics -  -SQH and SCDs  -OOB as tolerated/IS  -AM labs NOTE BASED ON AM EXAM AND PLANS    General Surgery Progress Note    SUBJECTIVE:   Patient seen and examined at bedside by chief during AM rounds. Overnight, patient noted to be receive 1 u PRBC, and lactate noted to clear.     Vital Signs Last 24 Hrs  T(C): 37.8 (29 Jun 2023 18:05), Max: 37.9 (29 Jun 2023 13:10)  T(F): 100.1 (29 Jun 2023 18:05), Max: 100.2 (29 Jun 2023 13:10)  HR: 89 (29 Jun 2023 18:00) (71 - 97)  BP: 146/66 (29 Jun 2023 18:00) (91/50 - 174/107)  BP(mean): 95 (29 Jun 2023 18:00) (67 - 135)  RR: 14 (29 Jun 2023 18:00) (11 - 26)  SpO2: 93% (29 Jun 2023 18:00) (92% - 100%)    Parameters below as of 29 Jun 2023 19:00  Patient On (Oxygen Delivery Method): nasal cannula, high flow  O2 Flow (L/min): 40  O2 Concentration (%): 40    I&O's Summary    28 Jun 2023 07:01  -  29 Jun 2023 07:00  --------------------------------------------------------  IN: 3731.1 mL / OUT: 1358 mL / NET: 2373.1 mL    29 Jun 2023 07:01  -  29 Jun 2023 18:13  --------------------------------------------------------  IN: 1659.5 mL / OUT: 1383 mL / NET: 276.5 mL        Physical Exam:  General: Resting in bed, intubated and sedated  HEENT: ATNC, ETT in place, NGT in nares  Pulmonary: Mechanically ventilated. R chest tube in place with serous output  Cardiovascular: NSR  Abdomen: Soft, mild distention, midline wound vac in place with mild SS output  Extremities: WWP, SCDs in place, Mild UE edema appreciated     LABS:                        8.4    10.16 )-----------( 132      ( 29 Jun 2023 05:30 )             26.4     06-29    137  |  108  |  19  ----------------------------<  103<H>  3.9   |  25  |  1.19    Ca    7.1<L>      29 Jun 2023 05:30  Phos  1.9     06-29  Mg     2.1     06-29    TPro  4.4<L>  /  Alb  2.3<L>  /  TBili  3.9<H>  /  DBili  x   /  AST  19  /  ALT  9<L>  /  AlkPhos  54  06-28    PT/INR - ( 28 Jun 2023 13:01 )   PT: 14.5 sec;   INR: 1.22          PTT - ( 28 Jun 2023 13:01 )  PTT:31.5 sec  Urinalysis Basic - ( 29 Jun 2023 05:30 )    Color: x / Appearance: x / SG: x / pH: x  Gluc: 103 mg/dL / Ketone: x  / Bili: x / Urobili: x   Blood: x / Protein: x / Nitrite: x   Leuk Esterase: x / RBC: x / WBC x   Sq Epi: x / Non Sq Epi: x / Bacteria: x

## 2023-06-29 NOTE — PROGRESS NOTE ADULT - SUBJECTIVE AND OBJECTIVE BOX
SUBJECTIVE: Patient seen and evaluated.        MEDICATIONS  (STANDING):  aMIOdarone Infusion 0.501 mG/Min (16.7 mL/Hr) IV Continuous <Continuous>  cefTRIAXone   IVPB 1000 milliGRAM(s) IV Intermittent every 24 hours  chlorhexidine 0.12% Liquid 15 milliLiter(s) Oral Mucosa every 12 hours  chlorhexidine 2% Cloths 1 Application(s) Topical daily  dexMEDEtomidine Infusion 0.2 MICROgram(s)/kG/Hr (5.05 mL/Hr) IV Continuous <Continuous>  dextrose 10% + sodium chloride 0.9%. 1000 milliLiter(s) (60 mL/Hr) IV Continuous <Continuous>  dextrose 5%. 1000 milliLiter(s) (50 mL/Hr) IV Continuous <Continuous>  dextrose 50% Injectable 25 Gram(s) IV Push once  glucagon  Injectable 1 milliGRAM(s) IntraMuscular once  heparin   Injectable 5000 Unit(s) SubCutaneous every 8 hours  insulin lispro (ADMELOG) corrective regimen sliding scale   SubCutaneous every 6 hours  lipid, fat emulsion (Fish Oil and Plant Based) 20% Infusion 0.49 Gm/kG/Day (20.83 mL/Hr) IV Continuous <Continuous>  metroNIDAZOLE  IVPB 500 milliGRAM(s) IV Intermittent every 8 hours  norepinephrine Infusion 0.056 MICROgram(s)/kG/Min (10.6 mL/Hr) IV Continuous <Continuous>  pantoprazole  Injectable 40 milliGRAM(s) IV Push daily  Parenteral Nutrition - Adult 1 Each (71 mL/Hr) TPN Continuous <Continuous>  propofol Infusion 10 MICROgram(s)/kG/Min (6.06 mL/Hr) IV Continuous <Continuous>    MEDICATIONS  (PRN):  dextrose Oral Gel 15 Gram(s) Oral once PRN Blood Glucose LESS THAN 70 milliGRAM(s)/deciliter      Vital Signs Last 24 Hrs  T(C): 37.9 (29 Jun 2023 13:10), Max: 37.9 (29 Jun 2023 13:10)  T(F): 100.2 (29 Jun 2023 13:10), Max: 100.2 (29 Jun 2023 13:10)  HR: 85 (29 Jun 2023 14:00) (71 - 97)  BP: 114/57 (29 Jun 2023 14:00) (91/50 - 174/107)  BP(mean): 82 (29 Jun 2023 14:00) (67 - 135)  RR: 13 (29 Jun 2023 14:00) (11 - 26)  SpO2: 93% (29 Jun 2023 14:00) (92% - 100%)    Parameters below as of 29 Jun 2023 14:00  Patient On (Oxygen Delivery Method): ventilator, CPAP    O2 Concentration (%): 40    Physical Exam:  Gen: intubated and sedated  Neuro: intubated and sedated  CV: RRR Reg s1s2 noM  Pulm: CTA b/l No w/r/r  Abd: Nontender and nondistended +vac in place draining serosanguinous fluid  Ext: No C/C +1 edema b/l arms up to elbows    Vasc: + DP b/l     I&O's Summary    28 Jun 2023 07:01  -  29 Jun 2023 07:00  --------------------------------------------------------  IN: 3731.1 mL / OUT: 1358 mL / NET: 2373.1 mL    29 Jun 2023 07:01  -  29 Jun 2023 15:15  --------------------------------------------------------  IN: 1349.1 mL / OUT: 475 mL / NET: 874.1 mL        LABS:                        8.4    10.16 )-----------( 132      ( 29 Jun 2023 05:30 )             26.4     06-29    137  |  108  |  19  ----------------------------<  103<H>  3.9   |  25  |  1.19    Ca    7.1<L>      29 Jun 2023 05:30  Phos  1.9     06-29  Mg     2.1     06-29    TPro  4.4<L>  /  Alb  2.3<L>  /  TBili  3.9<H>  /  DBili  x   /  AST  19  /  ALT  9<L>  /  AlkPhos  54  06-28    PT/INR - ( 28 Jun 2023 13:01 )   PT: 14.5 sec;   INR: 1.22          PTT - ( 28 Jun 2023 13:01 )  PTT:31.5 sec  Urinalysis Basic - ( 29 Jun 2023 05:30 )    Color: x / Appearance: x / SG: x / pH: x  Gluc: 103 mg/dL / Ketone: x  / Bili: x / Urobili: x   Blood: x / Protein: x / Nitrite: x   Leuk Esterase: x / RBC: x / WBC x   Sq Epi: x / Non Sq Epi: x / Bacteria: x      CAPILLARY BLOOD GLUCOSE      POCT Blood Glucose.: 101 mg/dL (29 Jun 2023 11:07)  POCT Blood Glucose.: 93 mg/dL (29 Jun 2023 06:56)  POCT Blood Glucose.: 89 mg/dL (29 Jun 2023 00:51)  POCT Blood Glucose.: 124 mg/dL (28 Jun 2023 17:37)    LIVER FUNCTIONS - ( 28 Jun 2023 13:00 )  Alb: 2.3 g/dL / Pro: 4.4 g/dL / ALK PHOS: 54 U/L / ALT: 9 U/L / AST: 19 U/L / GGT: x

## 2023-06-29 NOTE — PROGRESS NOTE ADULT - SUBJECTIVE AND OBJECTIVE BOX
SUBJECTIVE: Patient seen and examined bedside. Pt intubated and sedated. Unable to obtain subjective history at this time.    aMIOdarone Infusion 0.501 mG/Min IV Continuous <Continuous>  cefTRIAXone   IVPB 1000 milliGRAM(s) IV Intermittent every 24 hours  heparin   Injectable 5000 Unit(s) SubCutaneous every 8 hours  metroNIDAZOLE  IVPB 500 milliGRAM(s) IV Intermittent every 8 hours  norepinephrine Infusion 0.056 MICROgram(s)/kG/Min IV Continuous <Continuous>      Vital Signs Last 24 Hrs  T(C): 37.1 (29 Jun 2023 09:00), Max: 37.4 (29 Jun 2023 05:12)  T(F): 98.7 (29 Jun 2023 09:00), Max: 99.4 (29 Jun 2023 05:12)  HR: 97 (29 Jun 2023 10:17) (71 - 97)  BP: 126/58 (29 Jun 2023 10:17) (91/50 - 174/107)  BP(mean): 84 (29 Jun 2023 10:17) (67 - 135)  RR: 21 (29 Jun 2023 10:17) (11 - 26)  SpO2: 93% (29 Jun 2023 10:17) (92% - 100%)    Parameters below as of 29 Jun 2023 10:17  Patient On (Oxygen Delivery Method): ventilator, CPAP    O2 Concentration (%): 40  I&O's Detail    28 Jun 2023 07:01  -  29 Jun 2023 07:00  --------------------------------------------------------  IN:    Amiodarone: 317.3 mL    Dexmedetomidine: 15.2 mL    dextrose 5% + lactated ringers: 2280 mL    FentaNYL: 96.9 mL    IV PiggyBack: 50 mL    IV PiggyBack: 200 mL    IV PiggyBack: 100 mL    Norepinephrine: 49.4 mL    PRBCs (Packed Red Blood Cells): 400 mL    Propofol: 222.3 mL  Total IN: 3731.1 mL    OUT:    Chest Tube (mL): 38 mL    Indwelling Catheter - Urethral (mL): 1020 mL    Nasogastric/Oral tube (mL): 200 mL    VAC (Vacuum Assisted Closure) System (mL): 100 mL  Total OUT: 1358 mL    Total NET: 2373.1 mL      29 Jun 2023 07:01  -  29 Jun 2023 11:10  --------------------------------------------------------  IN:    Amiodarone: 66.8 mL    Dexmedetomidine: 45.5 mL    dextrose 10% + sodium chloride 0.9%: 180 mL    dextrose 5% + lactated ringers: 120 mL    FentaNYL: 5.1 mL    IV PiggyBack: 249.9 mL  Total IN: 667.3 mL    OUT:    Chest Tube (mL): 0 mL    Indwelling Catheter - Urethral (mL): 177 mL    Nasogastric/Oral tube (mL): 25 mL    Norepinephrine: 0 mL    Propofol: 0 mL    VAC (Vacuum Assisted Closure) System (mL): 20 mL  Total OUT: 222 mL    Total NET: 445.3 mL          General: NAD, resting comfortably in bed  Neuro: sedated, RASS -3  C/V: NSR  Pulm: Nonlabored breathing, no respiratory distress  Abd: soft, nondistended, midline incision with wound vac in place, holding suction, unable to assess for tenderness, rebound, or guarding  Extrem: WWP, no edema, SCDs in place        LABS:                        8.4    10.16 )-----------( 132      ( 29 Jun 2023 05:30 )             26.4     06-29    137  |  108  |  19  ----------------------------<  103<H>  3.9   |  25  |  1.19    Ca    7.1<L>      29 Jun 2023 05:30  Phos  1.9     06-29  Mg     2.1     06-29    TPro  4.4<L>  /  Alb  2.3<L>  /  TBili  3.9<H>  /  DBili  x   /  AST  19  /  ALT  9<L>  /  AlkPhos  54  06-28    PT/INR - ( 28 Jun 2023 13:01 )   PT: 14.5 sec;   INR: 1.22          PTT - ( 28 Jun 2023 13:01 )  PTT:31.5 sec  Urinalysis Basic - ( 29 Jun 2023 05:30 )    Color: x / Appearance: x / SG: x / pH: x  Gluc: 103 mg/dL / Ketone: x  / Bili: x / Urobili: x   Blood: x / Protein: x / Nitrite: x   Leuk Esterase: x / RBC: x / WBC x   Sq Epi: x / Non Sq Epi: x / Bacteria: x        RADIOLOGY & ADDITIONAL STUDIES:

## 2023-06-29 NOTE — PROGRESS NOTE ADULT - ASSESSMENT
Assessment:   78 year old male with a PMHx of Crohn's, HTN, HLD, Afib/flutter (on amiodarone), gout, ileocecectomy who was admitted with resolving SBO vs Crohn's flare s/p NGT decompression s/p laparoscopic lysis of adhesions converted to open with small bowel resection x3 and abthera placed. Thoracic surgery consulted on 6/27/23 for R PTX 2/2 R IJ central line. R pigtail placed at bedside with improved PTX. On 6/28/23 patient returned to OR with general surgery for abdominal closure, small bowel anastomosis and mikel-ileocolic resection. On 6/29 CXR reveals stable, small apical PTX. CT remains on suction.  Patient remains intubated and sedated in CCU.     Plan:  Problem 1: R Pneumothorax.    -2/2 R IJ central line  -s/p R pigtail placed on 6/27/23. Improved PTX on repeat CXR  -Keep pigtail on suction  -Daily CXR  -Per primary team, new central line planned to be placed  -Thoracic surgery team will continue to follow    Problem 2: Hx of HTN  -On pressors  -Care per primary team    Problem 3: Hx of A-Fib/Flutter  -On amiodarone  -Care per primary team Assessment:   78 year old male with a PMHx of Crohn's, HTN, HLD, Afib/flutter (on amiodarone), gout, ileocecectomy who was admitted with resolving SBO vs Crohn's flare s/p NGT decompression s/p laparoscopic lysis of adhesions converted to open with small bowel resection x3 and abthera placed. Thoracic surgery consulted on 6/27/23 for R PTX 2/2 R IJ central line. R pigtail placed at bedside with improved PTX. On 6/28/23 patient returned to OR with general surgery for abdominal closure, small bowel anastomosis and mikel-ileocolic resection. On 6/29 CXR reveals stable, small apical PTX. CT remains on suction.  Patient remains intubated and sedated in CCU.     Plan:  Problem 1: R Pneumothorax.    -2/2 R IJ central line  -s/p R pigtail placed on 6/27/23. Improved PTX on repeat CXR  -Keep pigtail on suction 1/5 intermittent airleak   -Daily CXR  -Per primary team, new central line planned to be placed  -Thoracic surgery team will continue to follow    Problem 2: Hx of HTN  -On pressors  -Care per primary team    Problem 3: Hx of A-Fib/Flutter  -On amiodarone  -Care per primary team

## 2023-06-29 NOTE — PROGRESS NOTE ADULT - SUBJECTIVE AND OBJECTIVE BOX
Patient discussed on morning rounds with Dr. Zambrano    Operation / Date: R Pigtail placement on 6/27/23    SUBJECTIVE ASSESSMENT:  78y Male seen in bed, currently intubated.         Vital Signs Last 24 Hrs  T(C): 37.9 (29 Jun 2023 13:10), Max: 37.9 (29 Jun 2023 13:10)  T(F): 100.2 (29 Jun 2023 13:10), Max: 100.2 (29 Jun 2023 13:10)  HR: 91 (29 Jun 2023 15:33) (71 - 97)  BP: 131/60 (29 Jun 2023 15:00) (91/50 - 174/107)  BP(mean): 87 (29 Jun 2023 15:00) (67 - 135)  RR: 16 (29 Jun 2023 15:00) (11 - 26)  SpO2: 94% (29 Jun 2023 15:33) (92% - 100%)    Parameters below as of 29 Jun 2023 15:00  Patient On (Oxygen Delivery Method): ventilator, CPAP    O2 Concentration (%): 40  I&O's Detail    28 Jun 2023 07:01  -  29 Jun 2023 07:00  --------------------------------------------------------  IN:    Amiodarone: 317.3 mL    Dexmedetomidine: 15.2 mL    dextrose 5% + lactated ringers: 2280 mL    FentaNYL: 96.9 mL    IV PiggyBack: 50 mL    IV PiggyBack: 200 mL    IV PiggyBack: 100 mL    Norepinephrine: 49.4 mL    PRBCs (Packed Red Blood Cells): 400 mL    Propofol: 222.3 mL  Total IN: 3731.1 mL    OUT:    Chest Tube (mL): 38 mL    Indwelling Catheter - Urethral (mL): 1020 mL    Nasogastric/Oral tube (mL): 200 mL    VAC (Vacuum Assisted Closure) System (mL): 100 mL  Total OUT: 1358 mL    Total NET: 2373.1 mL      29 Jun 2023 07:01  -  29 Jun 2023 15:50  --------------------------------------------------------  IN:    Amiodarone: 133.6 mL    Dexmedetomidine: 70.6 mL    dextrose 10% + sodium chloride 0.9%: 420 mL    dextrose 5% + lactated ringers: 120 mL    FentaNYL: 5.1 mL    IV PiggyBack: 499.8 mL    IV PiggyBack: 100 mL  Total IN: 1349.1 mL    OUT:    Chest Tube (mL): 8 mL    Indwelling Catheter - Urethral (mL): 372 mL    Nasogastric/Oral tube (mL): 25 mL    Norepinephrine: 0 mL    Propofol: 0 mL    VAC (Vacuum Assisted Closure) System (mL): 70 mL  Total OUT: 475 mL    Total NET: 874.1 mL          CHEST TUBE:  Yes, on suction  AIR LEAKS: Yes, 1+ intermittent   ISRAEL: Yes    PHYSICAL EXAM:  GEN: NAD, intubated  Psych: Intubated and sedated  Neuro: A&Ox3.  No focal deficits.  Moving all extremities.   HEENT: No obvious abnormalities  CV: RRR  Lungs: BL lung sounds   ABD: Soft, non-tender, non-distended.  +Bowel sounds  EXT: Warm and well perfused.  No peripheral edema noted  PV: Pedal pulses palpable     LABS:                        8.4    10.16 )-----------( 132      ( 29 Jun 2023 05:30 )             26.4       COUMADIN:  No    PT/INR - ( 28 Jun 2023 13:01 )   PT: 14.5 sec;   INR: 1.22          PTT - ( 28 Jun 2023 13:01 )  PTT:31.5 sec    06-29    137  |  108  |  19  ----------------------------<  103<H>  3.9   |  25  |  1.19    Ca    7.1<L>      29 Jun 2023 05:30  Phos  1.9     06-29  Mg     2.1     06-29    TPro  4.4<L>  /  Alb  2.3<L>  /  TBili  3.9<H>  /  DBili  x   /  AST  19  /  ALT  9<L>  /  AlkPhos  54  06-28      Urinalysis Basic - ( 29 Jun 2023 05:30 )    Color: x / Appearance: x / SG: x / pH: x  Gluc: 103 mg/dL / Ketone: x  / Bili: x / Urobili: x   Blood: x / Protein: x / Nitrite: x   Leuk Esterase: x / RBC: x / WBC x   Sq Epi: x / Non Sq Epi: x / Bacteria: x        MEDICATIONS  (STANDING):  aMIOdarone Infusion 0.501 mG/Min (16.7 mL/Hr) IV Continuous <Continuous>  cefTRIAXone   IVPB 1000 milliGRAM(s) IV Intermittent every 24 hours  chlorhexidine 0.12% Liquid 15 milliLiter(s) Oral Mucosa every 12 hours  chlorhexidine 2% Cloths 1 Application(s) Topical daily  dexMEDEtomidine Infusion 0.2 MICROgram(s)/kG/Hr (5.05 mL/Hr) IV Continuous <Continuous>  dextrose 10% + sodium chloride 0.9%. 1000 milliLiter(s) (60 mL/Hr) IV Continuous <Continuous>  dextrose 5%. 1000 milliLiter(s) (50 mL/Hr) IV Continuous <Continuous>  dextrose 50% Injectable 25 Gram(s) IV Push once  glucagon  Injectable 1 milliGRAM(s) IntraMuscular once  heparin   Injectable 5000 Unit(s) SubCutaneous every 8 hours  insulin lispro (ADMELOG) corrective regimen sliding scale   SubCutaneous every 6 hours  lipid, fat emulsion (Fish Oil and Plant Based) 20% Infusion 0.49 Gm/kG/Day (20.83 mL/Hr) IV Continuous <Continuous>  metroNIDAZOLE  IVPB 500 milliGRAM(s) IV Intermittent every 8 hours  norepinephrine Infusion 0.056 MICROgram(s)/kG/Min (10.6 mL/Hr) IV Continuous <Continuous>  pantoprazole  Injectable 40 milliGRAM(s) IV Push daily  Parenteral Nutrition - Adult 1 Each (71 mL/Hr) TPN Continuous <Continuous>  propofol Infusion 10 MICROgram(s)/kG/Min (6.06 mL/Hr) IV Continuous <Continuous>    MEDICATIONS  (PRN):  dextrose Oral Gel 15 Gram(s) Oral once PRN Blood Glucose LESS THAN 70 milliGRAM(s)/deciliter        RADIOLOGY & ADDITIONAL TESTS:  < from: Xray Chest 1 View- PORTABLE-Routine (06.29.23 @ 08:00) >  Small right apex pneumothorax again noted compared to prior exam   6/28/2023. Endotracheal tube tip at level of clavicles. Nasogastric tube   tip in stomach. Right chest tube and right venous catheter unchanged.   Possible small left pleural effusion. Mild hypoinflation.    < end of copied text >     Patient discussed on morning rounds with Dr. Zambrano    Operation / Date: R Pigtail placement on 6/27/23    SUBJECTIVE ASSESSMENT:  78y Male seen in bed, currently intubated and weaning sedation.     Vital Signs Last 24 Hrs  T(C): 37.9 (29 Jun 2023 13:10), Max: 37.9 (29 Jun 2023 13:10)  T(F): 100.2 (29 Jun 2023 13:10), Max: 100.2 (29 Jun 2023 13:10)  HR: 91 (29 Jun 2023 15:33) (71 - 97)  BP: 131/60 (29 Jun 2023 15:00) (91/50 - 174/107)  BP(mean): 87 (29 Jun 2023 15:00) (67 - 135)  RR: 16 (29 Jun 2023 15:00) (11 - 26)  SpO2: 94% (29 Jun 2023 15:33) (92% - 100%)    Parameters below as of 29 Jun 2023 15:00  Patient On (Oxygen Delivery Method): ventilator, CPAP    O2 Concentration (%): 40  I&O's Detail    28 Jun 2023 07:01  -  29 Jun 2023 07:00  --------------------------------------------------------  IN:    Amiodarone: 317.3 mL    Dexmedetomidine: 15.2 mL    dextrose 5% + lactated ringers: 2280 mL    FentaNYL: 96.9 mL    IV PiggyBack: 50 mL    IV PiggyBack: 200 mL    IV PiggyBack: 100 mL    Norepinephrine: 49.4 mL    PRBCs (Packed Red Blood Cells): 400 mL    Propofol: 222.3 mL  Total IN: 3731.1 mL    OUT:    Chest Tube (mL): 38 mL    Indwelling Catheter - Urethral (mL): 1020 mL    Nasogastric/Oral tube (mL): 200 mL    VAC (Vacuum Assisted Closure) System (mL): 100 mL  Total OUT: 1358 mL    Total NET: 2373.1 mL      29 Jun 2023 07:01  -  29 Jun 2023 15:50  --------------------------------------------------------  IN:    Amiodarone: 133.6 mL    Dexmedetomidine: 70.6 mL    dextrose 10% + sodium chloride 0.9%: 420 mL    dextrose 5% + lactated ringers: 120 mL    FentaNYL: 5.1 mL    IV PiggyBack: 499.8 mL    IV PiggyBack: 100 mL  Total IN: 1349.1 mL    OUT:    Chest Tube (mL): 8 mL    Indwelling Catheter - Urethral (mL): 372 mL    Nasogastric/Oral tube (mL): 25 mL    Norepinephrine: 0 mL    Propofol: 0 mL    VAC (Vacuum Assisted Closure) System (mL): 70 mL  Total OUT: 475 mL    Total NET: 874.1 mL  CHEST TUBE:  Yes, on suction  AIR LEAKS: Yes, 1+ intermittent   ISRAEL: Yes    PHYSICAL EXAM:  GEN: NAD, intubated  Psych: Intubated and sedated  Neuro: A&Ox3.  No focal deficits.  Moving all extremities.   HEENT: No obvious abnormalities  CV: RRR  Lungs: BL lung sounds   ABD: Soft, non-tender, non-distended.  +Bowel sounds  EXT: Warm and well perfused.  No peripheral edema noted  PV: Pedal pulses palpable     LABS:                        8.4    10.16 )-----------( 132      ( 29 Jun 2023 05:30 )             26.4       COUMADIN:  No    PT/INR - ( 28 Jun 2023 13:01 )   PT: 14.5 sec;   INR: 1.22          PTT - ( 28 Jun 2023 13:01 )  PTT:31.5 sec    06-29    137  |  108  |  19  ----------------------------<  103<H>  3.9   |  25  |  1.19    Ca    7.1<L>      29 Jun 2023 05:30  Phos  1.9     06-29  Mg     2.1     06-29    TPro  4.4<L>  /  Alb  2.3<L>  /  TBili  3.9<H>  /  DBili  x   /  AST  19  /  ALT  9<L>  /  AlkPhos  54  06-28      Urinalysis Basic - ( 29 Jun 2023 05:30 )    Color: x / Appearance: x / SG: x / pH: x  Gluc: 103 mg/dL / Ketone: x  / Bili: x / Urobili: x   Blood: x / Protein: x / Nitrite: x   Leuk Esterase: x / RBC: x / WBC x   Sq Epi: x / Non Sq Epi: x / Bacteria: x    MEDICATIONS  (STANDING):  aMIOdarone Infusion 0.501 mG/Min (16.7 mL/Hr) IV Continuous <Continuous>  cefTRIAXone   IVPB 1000 milliGRAM(s) IV Intermittent every 24 hours  chlorhexidine 0.12% Liquid 15 milliLiter(s) Oral Mucosa every 12 hours  chlorhexidine 2% Cloths 1 Application(s) Topical daily  dexMEDEtomidine Infusion 0.2 MICROgram(s)/kG/Hr (5.05 mL/Hr) IV Continuous <Continuous>  dextrose 10% + sodium chloride 0.9%. 1000 milliLiter(s) (60 mL/Hr) IV Continuous <Continuous>  dextrose 5%. 1000 milliLiter(s) (50 mL/Hr) IV Continuous <Continuous>  dextrose 50% Injectable 25 Gram(s) IV Push once  glucagon  Injectable 1 milliGRAM(s) IntraMuscular once  heparin   Injectable 5000 Unit(s) SubCutaneous every 8 hours  insulin lispro (ADMELOG) corrective regimen sliding scale   SubCutaneous every 6 hours  lipid, fat emulsion (Fish Oil and Plant Based) 20% Infusion 0.49 Gm/kG/Day (20.83 mL/Hr) IV Continuous <Continuous>  metroNIDAZOLE  IVPB 500 milliGRAM(s) IV Intermittent every 8 hours  norepinephrine Infusion 0.056 MICROgram(s)/kG/Min (10.6 mL/Hr) IV Continuous <Continuous>  pantoprazole  Injectable 40 milliGRAM(s) IV Push daily  Parenteral Nutrition - Adult 1 Each (71 mL/Hr) TPN Continuous <Continuous>  propofol Infusion 10 MICROgram(s)/kG/Min (6.06 mL/Hr) IV Continuous <Continuous>    MEDICATIONS  (PRN):  dextrose Oral Gel 15 Gram(s) Oral once PRN Blood Glucose LESS THAN 70 milliGRAM(s)/deciliter    RADIOLOGY & ADDITIONAL TESTS:  < from: Xray Chest 1 View- PORTABLE-Routine (06.29.23 @ 08:00) >  Small right apex pneumothorax again noted compared to prior exam   6/28/2023. Endotracheal tube tip at level of clavicles. Nasogastric tube   tip in stomach. Right chest tube and right venous catheter unchanged.   Possible small left pleural effusion. Mild hypoinflation.    < end of copied text >

## 2023-06-29 NOTE — PROGRESS NOTE ADULT - ASSESSMENT
78M w/ Crohn's, AFib/Flutter s/p DCCVs, remote ileocectomy and open appy here for resolving SBO vs Crohns flare, s/p NGT decompression. Now s/p laparoscopic lysis of adhesions, converted to open lysis of adhesions, SBR x 3 and left in discontinuity, abthera vac placement, 5L cryst, 1L 5% alb, 3 pRBC, 1 FFP, 1 plts. RTOR 6/28 for abdominal wall closure with ileocolic and SB anastomosis. Now off pressors.    -CPAP trial and extubation per SICU  -Start TPN  -Rest of care per SICU

## 2023-06-30 NOTE — PROGRESS NOTE ADULT - SUBJECTIVE AND OBJECTIVE BOX
SUBJECTIVE: Patient seen and examined bedside. Pt sundowning during exam, but denies abdominal pain or nausea.     aMIOdarone Infusion 0.501 mG/Min IV Continuous <Continuous>  cefTRIAXone   IVPB 2000 milliGRAM(s) IV Intermittent every 24 hours  DAPTOmycin IVPB 800 milliGRAM(s) IV Intermittent every 24 hours  heparin   Injectable 5000 Unit(s) SubCutaneous every 8 hours  hydrALAZINE Injectable 10 milliGRAM(s) IV Push every 8 hours PRN  metroNIDAZOLE  IVPB 500 milliGRAM(s) IV Intermittent every 8 hours      Vital Signs Last 24 Hrs  T(C): 37.1 (30 Jun 2023 13:00), Max: 38.4 (29 Jun 2023 23:00)  T(F): 98.8 (30 Jun 2023 13:00), Max: 101.1 (29 Jun 2023 23:00)  HR: 81 (30 Jun 2023 15:00) (67 - 97)  BP: 175/74 (30 Jun 2023 15:00) (122/57 - 220/86)  BP(mean): 106 (30 Jun 2023 15:00) (82 - 130)  RR: 21 (30 Jun 2023 15:00) (12 - 25)  SpO2: 92% (30 Jun 2023 15:00) (91% - 99%)    Parameters below as of 30 Jun 2023 15:00  Patient On (Oxygen Delivery Method): nasal cannula, high flow  O2 Flow (L/min): 40  O2 Concentration (%): 50  I&O's Detail    29 Jun 2023 07:01  -  30 Jun 2023 07:00  --------------------------------------------------------  IN:    Amiodarone: 399.8 mL    Dexmedetomidine: 70.6 mL    dextrose 10% + sodium chloride 0.9%: 480 mL    dextrose 5% + lactated ringers: 120 mL    Fat Emulsion (Fish Oil &amp; Plant Based) 20% Infusion: 270.4 mL    FentaNYL: 5.1 mL    IV PiggyBack: 499.8 mL    IV PiggyBack: 50 mL    IV PiggyBack: 100 mL    IV PiggyBack: 300 mL    TPN (Total Parenteral Nutrition): 1065 mL  Total IN: 3360.7 mL    OUT:    Chest Tube (mL): 51 mL    Indwelling Catheter - Urethral (mL): 3552 mL    Nasogastric/Oral tube (mL): 225 mL    Norepinephrine: 0 mL    Propofol: 0 mL    VAC (Vacuum Assisted Closure) System (mL): 275 mL  Total OUT: 4103 mL    Total NET: -742.3 mL      30 Jun 2023 07:01  -  30 Jun 2023 15:44  --------------------------------------------------------  IN:    Amiodarone: 116.9 mL    IV PiggyBack: 200 mL    IV PiggyBack: 250 mL    IV PiggyBack: 100 mL    TPN (Total Parenteral Nutrition): 497 mL  Total IN: 1163.9 mL    OUT:    Chest Tube (mL): 12 mL    Fat Emulsion (Fish Oil &amp; Plant Based) 20% Infusion: 0 mL    Indwelling Catheter - Urethral (mL): 2750 mL    Nasogastric/Oral tube (mL): 0 mL    VAC (Vacuum Assisted Closure) System (mL): 0 mL  Total OUT: 2762 mL    Total NET: -1598.1 mL          General: NAD, resting comfortably in bed  HEENT: NGT in place  C/V: NSR  Pulm: Nonlabored breathing, no respiratory distress  Chest: Right sided chest tube in place with no air leak  Abd: soft, NT/ND, midline wound vac in place, holding suction, no rebound, no guarding  Extrem: WWP, no edema, SCDs in place        LABS:                        8.1    15.72 )-----------( 161      ( 30 Jun 2023 11:22 )             25.4     06-30    140  |  107  |  21  ----------------------------<  122<H>  3.6   |  28  |  1.17    Ca    7.1<L>      30 Jun 2023 05:30  Phos  2.2     06-30  Mg     2.4     06-30        Urinalysis Basic - ( 30 Jun 2023 05:30 )    Color: x / Appearance: x / SG: x / pH: x  Gluc: 122 mg/dL / Ketone: x  / Bili: x / Urobili: x   Blood: x / Protein: x / Nitrite: x   Leuk Esterase: x / RBC: x / WBC x   Sq Epi: x / Non Sq Epi: x / Bacteria: x        RADIOLOGY & ADDITIONAL STUDIES:

## 2023-06-30 NOTE — PROGRESS NOTE ADULT - SUBJECTIVE AND OBJECTIVE BOX
SUBJECTIVE: Patient seen and evaluated.        MEDICATIONS  (STANDING):  albuterol/ipratropium for Nebulization 3 milliLiter(s) Nebulizer every 6 hours  aMIOdarone Infusion 0.501 mG/Min (16.7 mL/Hr) IV Continuous <Continuous>  cefTRIAXone   IVPB 2000 milliGRAM(s) IV Intermittent every 24 hours  chlorhexidine 2% Cloths 1 Application(s) Topical daily  dextrose 5%. 1000 milliLiter(s) (50 mL/Hr) IV Continuous <Continuous>  dextrose 50% Injectable 25 Gram(s) IV Push once  glucagon  Injectable 1 milliGRAM(s) IntraMuscular once  heparin   Injectable 5000 Unit(s) SubCutaneous every 8 hours  insulin lispro (ADMELOG) corrective regimen sliding scale   SubCutaneous every 6 hours  lipid, fat emulsion (Fish Oil and Plant Based) 20% Infusion 0.49 Gm/kG/Day (20.83 mL/Hr) IV Continuous <Continuous>  metroNIDAZOLE  IVPB 500 milliGRAM(s) IV Intermittent every 8 hours  pantoprazole  Injectable 40 milliGRAM(s) IV Push daily  Parenteral Nutrition - Adult 1 Each (71 mL/Hr) TPN Continuous <Continuous>  Parenteral Nutrition - Adult 1 Each (71 mL/Hr) TPN Continuous <Continuous>    MEDICATIONS  (PRN):  dextrose Oral Gel 15 Gram(s) Oral once PRN Blood Glucose LESS THAN 70 milliGRAM(s)/deciliter  hydrALAZINE Injectable 10 milliGRAM(s) IV Push every 8 hours PRN SBP> 160  HYDROmorphone  Injectable 0.2 milliGRAM(s) IV Push every 4 hours PRN Moderate Pain (4 - 6)  HYDROmorphone  Injectable 0.5 milliGRAM(s) IV Push every 4 hours PRN Severe Pain (7 - 10)      Vital Signs Last 24 Hrs  T(C): 37.1 (30 Jun 2023 13:00), Max: 38.4 (29 Jun 2023 23:00)  T(F): 98.8 (30 Jun 2023 13:00), Max: 101.1 (29 Jun 2023 23:00)  HR: 77 (30 Jun 2023 13:20) (67 - 97)  BP: 169/79 (30 Jun 2023 13:00) (122/57 - 220/86)  BP(mean): 114 (30 Jun 2023 13:00) (82 - 130)  RR: 19 (30 Jun 2023 13:20) (12 - 25)  SpO2: 93% (30 Jun 2023 13:20) (91% - 99%)    Parameters below as of 30 Jun 2023 13:20  Patient On (Oxygen Delivery Method): nasal cannula, high flow  O2 Flow (L/min): 40  O2 Concentration (%): 50    Physical Exam:  Gen: Awake and alert, responding appropriately  Neuro: AOx3,   CV: RRR Reg s1s2 noM  Pulm: Decreased lung sounds in left base  Abd: Nontender and nondistended +vac in place draining serosanguinous fluid  Ext: No C/C +1 edema b/l arms up to elbows    Vasc: + DP b/l     I&O's Summary    29 Jun 2023 07:01  -  30 Jun 2023 07:00  --------------------------------------------------------  IN: 3360.7 mL / OUT: 4103 mL / NET: -742.3 mL    30 Jun 2023 07:01  -  30 Jun 2023 14:02  --------------------------------------------------------  IN: 1063.9 mL / OUT: 2552 mL / NET: -1488.1 mL        LABS:                        8.1    15.72 )-----------( 161      ( 30 Jun 2023 11:22 )             25.4     06-30    140  |  107  |  21  ----------------------------<  122<H>  3.6   |  28  |  1.17    Ca    7.1<L>      30 Jun 2023 05:30  Phos  2.2     06-30  Mg     2.4     06-30        Urinalysis Basic - ( 30 Jun 2023 05:30 )    Color: x / Appearance: x / SG: x / pH: x  Gluc: 122 mg/dL / Ketone: x  / Bili: x / Urobili: x   Blood: x / Protein: x / Nitrite: x   Leuk Esterase: x / RBC: x / WBC x   Sq Epi: x / Non Sq Epi: x / Bacteria: x      CAPILLARY BLOOD GLUCOSE      POCT Blood Glucose.: 123 mg/dL (30 Jun 2023 11:16)  POCT Blood Glucose.: 105 mg/dL (30 Jun 2023 07:15)  POCT Blood Glucose.: 111 mg/dL (30 Jun 2023 00:25)  POCT Blood Glucose.: 96 mg/dL (29 Jun 2023 17:00)        RADIOLOGY & ADDITIONAL STUDIES:

## 2023-06-30 NOTE — PROGRESS NOTE ADULT - ASSESSMENT
78M w/ Crohn's, AFib/Flutter s/p DCCVs, remote ileocectomy and open appy here for resolving SBO vs Crohns flare, s/p NGT decompression. Now s/p laparoscopic lysis of adhesions, converted to open lysis of adhesions, SBR x 3 and left in discontinuity, abthera vac placement,  RTOR on 6/28 for abdominal closure, small bowel anastamosis and neoileocolic. Extubated on POD1.     Recommendations:  TPN  Continue abdominal binder  Wound vac  Goal euvolemic volume status  SICU level care  Surgery Team 4C following

## 2023-06-30 NOTE — PHYSICAL THERAPY INITIAL EVALUATION ADULT - PERTINENT HX OF CURRENT PROBLEM, REHAB EVAL
78M w/ Crohn's, AFib/Flutter s/p DCCVs, remote ileocectomy and open appy here for resolving SBO vs Crohns flare, s/p NGT decompression. Now s/p laparoscopic lysis of adhesions, converted to open lysis of adhesions, SBR x 3 and left in discontinuity, abthera vac placement,  RTOR on 6/28 for abdominal closure, small bowel anastamosis and neoileocolic. Extubated on POD1.

## 2023-06-30 NOTE — PHYSICAL THERAPY INITIAL EVALUATION ADULT - GENERAL OBSERVATIONS, REHAB EVAL
Pt received semi supine in bed +central line +tele +HFNC +chest tube +woud vac +mccauley +NGT. RN Ni aware of intent to treat PT Chi present for cotreat. Pt 2 person max A for bed to chair transfers stand pivot. Pt was left as received left in chair with call bell in reach, VSS, and in NAD.

## 2023-06-30 NOTE — PROGRESS NOTE ADULT - ASSESSMENT
78M w/ Crohn's, AFib/Flutter s/p DCCVs, remote ileocectomy and open appy here for resolving SBO vs Crohns flare, s/p NGT decompression. Now s/p laparoscopic lysis of adhesions, converted to open lysis of adhesions, SBR x 3 and left in discontinuity, abthera vac placement,  RTOR on 6/28 for abdominal closure, small bowel anastamosis and neoileocolic.    Neuro: off sedation, dilaudid PRN, zofran prn for nausea; Hx anxiety: hold venlafaxine ETOH Hx:  CV: MAP goal > 65; Hx of Afib/flutter: s/p DCCV, amio gtt cont, hold metop; Hx of HLD: hold rasovastatin; TTE (04/22) - PASP 50mmHg, EF 69%. Hydralazine 10PRN for SBP>160.  Pulm: Satting well on HFNC 40%/40 Duonebs and Chest PT  GI: TPN, SBO: NGT to LIWS; NPO; Hx of Crohn's: s/p ileocectomy, open appy; now open abdomen w/ abthera, after TRE, SBR x3. RTOR 6/28 for mikel-ileocolic resection and small bowel anastamosis, bridging strattice mesh Abdominal binder at all times  : [place], ELVI  ID: CTX (6/26-), Flagyl (6/26-)  Heme: EBL 1.5L, 3 units pRBC  Endo: ISS; Hx of hypothyroid: cont synthroid  PPx: SCD, SQH   Lines: PIV x 2, R TLC (06/26), [place] (6/26-30)  Wounds: wound vac  PT/OT: Ordered  Dispo: SICU

## 2023-06-30 NOTE — PROGRESS NOTE ADULT - SUBJECTIVE AND OBJECTIVE BOX
General Surgery Progress Note    SUBJECTIVE:   Patient seen and examined at bedside by shashi during AM rounds. Overnight, patient noted to febrile to 101.1 and pancultured. Patient on HFNC this AM and reports he feels well.    Vital Signs Last 24 Hrs  T(C): 36.2 (30 Jun 2023 17:48), Max: 38.4 (29 Jun 2023 23:00)  T(F): 97.2 (30 Jun 2023 17:48), Max: 101.1 (29 Jun 2023 23:00)  HR: 84 (30 Jun 2023 17:12) (67 - 97)  BP: 176/76 (30 Jun 2023 17:12) (122/57 - 220/86)  BP(mean): 109 (30 Jun 2023 17:12) (82 - 130)  RR: 20 (30 Jun 2023 17:12) (12 - 25)  SpO2: 93% (30 Jun 2023 17:12) (91% - 96%)    Parameters below as of 30 Jun 2023 18:00  Patient On (Oxygen Delivery Method): nasal cannula, high flow  O2 Flow (L/min): 40  O2 Concentration (%): 50    I&O's Summary    29 Jun 2023 07:01  -  30 Jun 2023 07:00  --------------------------------------------------------  IN: 3360.7 mL / OUT: 4103 mL / NET: -742.3 mL    30 Jun 2023 07:01  -  30 Jun 2023 19:03  --------------------------------------------------------  IN: 1706.3 mL / OUT: 3795 mL / NET: -2088.7 mL        Physical Exam:  General: Resting comfortably in bed, NAD  Neuro: A&Ox3  HEENT: ATNC, HFNC in nares  Pulmonary: Saturating well on HFNC / Chest tube in place with serous output  Cardiovascular: NSR  Abdomen: Soft, nondistended, midline wound vac in place with serous output  Extremities: WWP, SCDs in place, No significant edema appreciated    LABS:                        8.1    15.72 )-----------( 161      ( 30 Jun 2023 11:22 )             25.4     06-30    145  |  108  |  24<H>  ----------------------------<  113<H>  4.0   |  30  |  1.11    Ca    7.3<L>      30 Jun 2023 16:58  Phos  1.6     06-30  Mg     2.3     06-30        Urinalysis Basic - ( 30 Jun 2023 16:58 )    Color: x / Appearance: x / SG: x / pH: x  Gluc: 113 mg/dL / Ketone: x  / Bili: x / Urobili: x   Blood: x / Protein: x / Nitrite: x   Leuk Esterase: x / RBC: x / WBC x   Sq Epi: x / Non Sq Epi: x / Bacteria: x        Plan:  -NPO/IVF - LR @ 100  -Antibiotics -  -SQH and SCDs  -OOB as tolerated/IS  -AM labs

## 2023-06-30 NOTE — PHYSICAL THERAPY INITIAL EVALUATION ADULT - DID THE PATIENT HAVE SURGERY?
Laparoscopic lysis of intestinal adhesions / Exploratory laparotomy/ Open lysis of intestinal adhesions/ Resection of small bowel / Temporary closure of abdominal cavity/yes

## 2023-06-30 NOTE — PROGRESS NOTE ADULT - SUBJECTIVE AND OBJECTIVE BOX
single temp spike last PM  WBC elevated  enterococcus in peritoneal fluid  pain well controlled  improved mental status  CXR with atelectasis and pleural effusion L>R  adequate UO  serosang output from Vac  stable H&H  AVSS  ABD soft  minimal to no tenderness    OOB  PT  Pulmonary toilet  Diuresis  ID consult  Repeat CBC  ?CT

## 2023-06-30 NOTE — PROGRESS NOTE ADULT - SUBJECTIVE AND OBJECTIVE BOX
Awake and alert BP high Afeb In RR Good BS ant No LE edema Excellent diuresis overnight WBCs up Hct stable

## 2023-06-30 NOTE — CONSULT NOTE ADULT - ASSESSMENT
78M h/o Crohn's disease, ileocecectomy, open appy, AFib/Aflutter s/p multiple DCCV p/w acute onset of abdominal pain on 6/23, found to have SBO.  S/p ex-lap, open TRE, resection of small bowell on 6/26. Course c/b pneumothorax s/p R pigtail placement on 6/27, RTOR on 6/28 s/p ileocolic resection with anastomosis, small bowel anastomosis and abdominal wall reconstruction and washout.   6/28 body fluid grow E. casseliflavus, which is intrinsically resistant to vanco.  Case d/w Dr. Peterson - source control achieved.  As long as patient continues to improve, then will plan to treat with 5 day course of abx.    - start daptomycin 800mg IV q24h  - cont CTX 2g IV q24h  - cont flagyl 500mg PO q8h   - f/u E. casseliflavus susceptibility for dapto    Team 1 will follow you.  Dr. Neville is covering me this weekend.  Case d/w primary team.    Angie Frye MD, MS  Infectious Disease attending  work cell [phone]   For any questions during evening/weekend/holiday, please page ID on call        78M h/o Crohn's disease, ileocecectomy, open appy, AFib/Aflutter s/p multiple DCCV p/w acute onset of abdominal pain on 6/23, found to have SBO.  S/p ex-lap, open TRE, resection of small bowell on 6/26. Course c/b pneumothorax s/p R pigtail placement on 6/27, RTOR on 6/28 s/p ileocolic resection with anastomosis, small bowel anastomosis and abdominal wall reconstruction and washout.   6/28 body fluid grow E. casseliflavus, which is intrinsically resistant to vanco.  Case d/w Dr. Peterson - source control achieved.  As long as patient continues to improve, then will plan to treat with 5 day course of abx.    - start daptomycin 800mg IV q24h  - cont CTX 2g IV q24h  - cont flagyl 500mg PO q8h   - f/u E. casseliflavus susceptibility for dapto  - check CK    Team 1 will follow you.  Dr. Neville is covering me this weekend.  Case d/w primary team.    Angie Frye MD, MS  Infectious Disease attending  work cell [phone]   For any questions during evening/weekend/holiday, please page ID on call

## 2023-06-30 NOTE — PROGRESS NOTE ADULT - SUBJECTIVE AND OBJECTIVE BOX
Pt seen and examined   extubated  alert      REVIEW OF SYSTEMS:  Constitutional: No fever,   Cardiovascular: No chest pain, palpitations, dizzines  Gastrointestinal: No abdominal or epigastric pain. No nausea, vomiting   Skin: No itching, burning, rashes        MEDICATIONS:  MEDICATIONS  (STANDING):  albuterol/ipratropium for Nebulization 3 milliLiter(s) Nebulizer every 6 hours  aMIOdarone Infusion 0.501 mG/Min (16.7 mL/Hr) IV Continuous <Continuous>  cefTRIAXone   IVPB 2000 milliGRAM(s) IV Intermittent every 24 hours  chlorhexidine 2% Cloths 1 Application(s) Topical daily  dextrose 5%. 1000 milliLiter(s) (50 mL/Hr) IV Continuous <Continuous>  dextrose 50% Injectable 25 Gram(s) IV Push once  glucagon  Injectable 1 milliGRAM(s) IntraMuscular once  heparin   Injectable 5000 Unit(s) SubCutaneous every 8 hours  insulin lispro (ADMELOG) corrective regimen sliding scale   SubCutaneous every 6 hours  lipid, fat emulsion (Fish Oil and Plant Based) 20% Infusion 0.49 Gm/kG/Day (20.83 mL/Hr) IV Continuous <Continuous>  metroNIDAZOLE  IVPB 500 milliGRAM(s) IV Intermittent every 8 hours  pantoprazole  Injectable 40 milliGRAM(s) IV Push daily  Parenteral Nutrition - Adult 1 Each (71 mL/Hr) TPN Continuous <Continuous>  Parenteral Nutrition - Adult 1 Each (71 mL/Hr) TPN Continuous <Continuous>    MEDICATIONS  (PRN):  dextrose Oral Gel 15 Gram(s) Oral once PRN Blood Glucose LESS THAN 70 milliGRAM(s)/deciliter  hydrALAZINE Injectable 10 milliGRAM(s) IV Push every 8 hours PRN SBP> 160  HYDROmorphone  Injectable 0.2 milliGRAM(s) IV Push every 4 hours PRN Moderate Pain (4 - 6)  HYDROmorphone  Injectable 0.5 milliGRAM(s) IV Push every 4 hours PRN Severe Pain (7 - 10)      Allergies    penicillin (Angioedema)    Intolerances        Vital Signs Last 24 Hrs  T(C): 36.4 (30 Jun 2023 09:00), Max: 38.4 (29 Jun 2023 23:00)  T(F): 97.6 (30 Jun 2023 09:00), Max: 101.1 (29 Jun 2023 23:00)  HR: 70 (30 Jun 2023 11:00) (67 - 97)  BP: 189/77 (30 Jun 2023 11:00) (114/57 - 196/79)  BP(mean): 111 (30 Jun 2023 11:00) (82 - 130)  RR: 21 (30 Jun 2023 11:00) (11 - 24)  SpO2: 96% (30 Jun 2023 11:00) (92% - 99%)    Parameters below as of 30 Jun 2023 11:00  Patient On (Oxygen Delivery Method): nasal cannula, high flow  O2 Flow (L/min): 40  O2 Concentration (%): 50    06-29 @ 07:01  -  06-30 @ 07:00  --------------------------------------------------------  IN: 3360.7 mL / OUT: 4103 mL / NET: -742.3 mL    06-30 @ 07:01  -  06-30 @ 12:14  --------------------------------------------------------  IN: 563.5 mL / OUT: 1792 mL / NET: -1228.5 mL        PHYSICAL EXAM:    Gener in no acute distress  HEENT: MMM, conjunctiva and sclera clear, NGT  Gastrointestinal: Soft non-tender non-distended;(-)bowel sounds; vac  Skin: Warm and dry. No obvious rash  Ext: edema    LABS:  ABG - ( 30 Jun 2023 05:41 )  pH, Arterial: 7.42  pH, Blood: x     /  pCO2: 43    /  pO2: 81    / HCO3: 28    / Base Excess: 2.9   /  SaO2: 97.8                CBC Full  -  ( 30 Jun 2023 11:22 )  WBC Count : 15.72 K/uL  RBC Count : 2.66 M/uL  Hemoglobin : 8.1 g/dL  Hematocrit : 25.4 %  Platelet Count - Automated : 161 K/uL  Mean Cell Volume : 95.5 fl  Mean Cell Hemoglobin : 30.5 pg  Mean Cell Hemoglobin Concentration : 31.9 gm/dL  Auto Neutrophil # : x  Auto Lymphocyte # : x  Auto Monocyte # : x  Auto Eosinophil # : x  Auto Basophil # : x  Auto Neutrophil % : x  Auto Lymphocyte % : x  Auto Monocyte % : x  Auto Eosinophil % : x  Auto Basophil % : x    06-30    140  |  107  |  21  ----------------------------<  122<H>  3.6   |  28  |  1.17    Ca    7.1<L>      30 Jun 2023 05:30  Phos  2.2     06-30  Mg     2.4     06-30    TPro  4.4<L>  /  Alb  2.3<L>  /  TBili  3.9<H>  /  DBili  x   /  AST  19  /  ALT  9<L>  /  AlkPhos  54  06-28    PT/INR - ( 28 Jun 2023 13:01 )   PT: 14.5 sec;   INR: 1.22          PTT - ( 28 Jun 2023 13:01 )  PTT:31.5 sec      Urinalysis Basic - ( 30 Jun 2023 05:30 )    Color: x / Appearance: x / SG: x / pH: x  Gluc: 122 mg/dL / Ketone: x  / Bili: x / Urobili: x   Blood: x / Protein: x / Nitrite: x   Leuk Esterase: x / RBC: x / WBC x   Sq Epi: x / Non Sq Epi: x / Bacteria: x                RADIOLOGY & ADDITIONAL STUDIES (The following images were personally reviewed):

## 2023-06-30 NOTE — PROGRESS NOTE ADULT - ASSESSMENT
78M w/ Crohn's, AFib/Flutter s/p DCCVs, remote ileocectomy and open appy here for resolving SBO vs Crohns flare, s/p NGT decompression. Now s/p laparoscopic lysis of adhesions, converted to open lysis of adhesions, SBR x 3 and left in discontinuity, abthera vac placement, 5L cryst, 1L 5% alb, 3 pRBC, 1 FFP, 1 plts. RTOR 6/28 for abdominal wall closure with ileocolic and SB anastomosis. Now off pressors. Extubated 6/29.    -Continue TPN  -F/U BCx  -Rest of care per SICU

## 2023-07-01 NOTE — PROGRESS NOTE ADULT - SUBJECTIVE AND OBJECTIVE BOX
Pt seen and examined   he is out of bed and sitting on the chair  no BMs yet  NGT out    REVIEW OF SYSTEMS:  Constitutional: No fever,   Cardiovascular: No chest pain, palpitations, dizziness   Gastrointestinal: No abdominal or epigastric pain. No nausea, vomiting No BM  Skin: No itching, burning, rashes or lesions       MEDICATIONS:  MEDICATIONS  (STANDING):  acetaminophen   IVPB .. 1000 milliGRAM(s) IV Intermittent every 6 hours  albuterol/ipratropium for Nebulization 3 milliLiter(s) Nebulizer every 6 hours  aMIOdarone Infusion 0.501 mG/Min (16.7 mL/Hr) IV Continuous <Continuous>  cefTRIAXone   IVPB 2000 milliGRAM(s) IV Intermittent every 24 hours  chlorhexidine 2% Cloths 1 Application(s) Topical daily  chlorhexidine 2% Cloths 1 Application(s) Topical <User Schedule>  DAPTOmycin IVPB 800 milliGRAM(s) IV Intermittent every 24 hours  dextrose 5%. 1000 milliLiter(s) (50 mL/Hr) IV Continuous <Continuous>  dextrose 50% Injectable 25 Gram(s) IV Push once  glucagon  Injectable 1 milliGRAM(s) IntraMuscular once  heparin   Injectable 5000 Unit(s) SubCutaneous every 8 hours  insulin lispro (ADMELOG) corrective regimen sliding scale   SubCutaneous every 6 hours  ketorolac   Injectable 15 milliGRAM(s) IV Push every 12 hours  metroNIDAZOLE  IVPB 500 milliGRAM(s) IV Intermittent every 8 hours  pantoprazole  Injectable 40 milliGRAM(s) IV Push daily  Parenteral Nutrition - Adult 1 Each (71 mL/Hr) TPN Continuous <Continuous>  potassium chloride  10 mEq/100 mL IVPB 10 milliEquivalent(s) IV Intermittent every 1 hour    MEDICATIONS  (PRN):  dextrose Oral Gel 15 Gram(s) Oral once PRN Blood Glucose LESS THAN 70 milliGRAM(s)/deciliter  hydrALAZINE Injectable 10 milliGRAM(s) IV Push every 8 hours PRN SBP> 160  HYDROmorphone  Injectable 0.2 milliGRAM(s) IV Push every 4 hours PRN Moderate Pain (4 - 6)  HYDROmorphone  Injectable 0.5 milliGRAM(s) IV Push every 4 hours PRN Severe Pain (7 - 10)  sodium chloride 0.9% lock flush 10 milliLiter(s) IV Push every 1 hour PRN Pre/post blood products, medications, blood draw, and to maintain line patency      Allergies    penicillin (Angioedema)    Intolerances        Vital Signs Last 24 Hrs  T(C): 37.1 (01 Jul 2023 10:23), Max: 37.7 (30 Jun 2023 21:59)  T(F): 98.7 (01 Jul 2023 10:23), Max: 99.9 (30 Jun 2023 21:59)  HR: 73 (01 Jul 2023 09:07) (68 - 89)  BP: 149/67 (01 Jul 2023 09:00) (149/67 - 220/86)  BP(mean): 96 (01 Jul 2023 09:00) (96 - 124)  RR: 23 (01 Jul 2023 09:07) (16 - 36)  SpO2: 93% (01 Jul 2023 09:07) (91% - 96%)    Parameters below as of 01 Jul 2023 09:07  Patient On (Oxygen Delivery Method): nasal cannula, high flow  O2 Flow (L/min): 40  O2 Concentration (%): 60    06-30 @ 07:01 - 07-01 @ 07:00  --------------------------------------------------------  IN: 3008.2 mL / OUT: 5475 mL / NET: -2466.8 mL    07-01 @ 07:01 - 07-01 @ 11:24  --------------------------------------------------------  IN: 87.7 mL / OUT: 140 mL / NET: -52.3 mL        PHYSICAL EXAM:    General: ; in no acute distress  HEENT: MMM, conjunctiva and sclera clear  Gastrointestinal: Soft non-tender non-distended; vac on, no bowel sounds yet  Skin: Warm and dry. No obvious rash    LABS:  ABG - ( 30 Jun 2023 05:41 )  pH, Arterial: 7.42  pH, Blood: x     /  pCO2: 43    /  pO2: 81    / HCO3: 28    / Base Excess: 2.9   /  SaO2: 97.8                CBC Full  -  ( 01 Jul 2023 05:18 )  WBC Count : 17.52 K/uL  RBC Count : 2.67 M/uL  Hemoglobin : 8.1 g/dL  Hematocrit : 24.7 %  Platelet Count - Automated : 180 K/uL  Mean Cell Volume : 92.5 fl  Mean Cell Hemoglobin : 30.3 pg  Mean Cell Hemoglobin Concentration : 32.8 gm/dL  Auto Neutrophil # : x  Auto Lymphocyte # : x  Auto Monocyte # : x  Auto Eosinophil # : x  Auto Basophil # : x  Auto Neutrophil % : x  Auto Lymphocyte % : x  Auto Monocyte % : x  Auto Eosinophil % : x  Auto Basophil % : x    07-01    143  |  106  |  29<H>  ----------------------------<  157<H>  3.5   |  30  |  1.15    Ca    6.8<L>      01 Jul 2023 05:18  Phos  2.2     07-01  Mg     2.4     07-01            Urinalysis Basic - ( 01 Jul 2023 05:18 )    Color: x / Appearance: x / SG: x / pH: x  Gluc: 157 mg/dL / Ketone: x  / Bili: x / Urobili: x   Blood: x / Protein: x / Nitrite: x   Leuk Esterase: x / RBC: x / WBC x   Sq Epi: x / Non Sq Epi: x / Bacteria: x                RADIOLOGY & ADDITIONAL STUDIES (The following images were personally reviewed):

## 2023-07-01 NOTE — PROGRESS NOTE ADULT - ASSESSMENT
78M w/ Crohn's, AFib/Flutter s/p DCCVs, remote ileocectomy and open appy here for resolving SBO vs Crohns flare, s/p NGT decompression. Now s/p laparoscopic lysis of adhesions, converted to open lysis of adhesions, SBR x 3 and left in discontinuity, abthera vac placement 2/27. 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    Neuro: off sedation, dilaudid PRN, zofran prn for nausea; Hx anxiety: hold venlafaxine ETOH Hx:  CV: MAP goal > 65; Hx of Afib/flutter: s/p DCCV, amio gtt cont, hold metop; Hx of HLD: hold rasovastatin; TTE (04/22) - PASP 50mmHg, EF 69%. Hydralazine 10PRN for SBP>160. Monitor HTN  Pulm: Satting well on HFNC 40%/60 - wean as tolerated. Duonebs and Chest PT q6  GI: TPN, SBO, strict NPO; NPO; Hx of Crohn's: s/p ileocectomy, open appy; now open abdomen w/ abthera, after TRE, SBR x3. RTOR 6/28 for mikel-ileocolic resection and small bowel anastamosis, bridging strattice mesh Abdominal binder at all times. NGT d/c 7.1.  : Poole, ELVI - resolving  ID: perforated viscus, OR Cx: VRE enterococcus: CTX (6/26-), Flagyl (6/26-), Dapto (6/30)  Heme: EBL 1.5L, 3 units pRBC  Endo: ISS; Hx of hypothyroid: cont synthroid  PPx: SCD, SQH   Lines: PIV x 2, Victoria (6/26-30), RUE PICC (6/30--) // DC: R TLC (06/26-30),   Wounds: wound vac - change today  PT/OT: Ordered  Dispo: SICU

## 2023-07-01 NOTE — PROGRESS NOTE ADULT - ASSESSMENT
chest Xray done for follow up of pneumothorax  NGT out  abdomen is undistended  no BS and no Bms yet  on TPN

## 2023-07-01 NOTE — PROGRESS NOTE ADULT - SUBJECTIVE AND OBJECTIVE BOX
Awake and alert this am NO pain BP slightly high Afeb In RR Good BS ant No LE edema Hct stable low WBCs slightly increased UO good

## 2023-07-01 NOTE — PROGRESS NOTE ADULT - SUBJECTIVE AND OBJECTIVE BOX
STATUS POST:      POST OPERATIVE DAY #:     SUBJECTIVE: Pt seen and examined at bedside this am by surgery team. Tolerating diet, pain well controlled. Denies f/n/v/cp/sob.    MEDICATIONS  (STANDING):  acetaminophen   IVPB .. 1000 milliGRAM(s) IV Intermittent every 6 hours  albuterol/ipratropium for Nebulization 3 milliLiter(s) Nebulizer every 6 hours  aMIOdarone Infusion 0.501 mG/Min (16.7 mL/Hr) IV Continuous <Continuous>  cefTRIAXone   IVPB 2000 milliGRAM(s) IV Intermittent every 24 hours  chlorhexidine 2% Cloths 1 Application(s) Topical daily  chlorhexidine 2% Cloths 1 Application(s) Topical <User Schedule>  DAPTOmycin IVPB 800 milliGRAM(s) IV Intermittent every 24 hours  dextrose 5%. 1000 milliLiter(s) (50 mL/Hr) IV Continuous <Continuous>  dextrose 50% Injectable 25 Gram(s) IV Push once  glucagon  Injectable 1 milliGRAM(s) IntraMuscular once  heparin   Injectable 5000 Unit(s) SubCutaneous every 8 hours  insulin lispro (ADMELOG) corrective regimen sliding scale   SubCutaneous every 6 hours  ketorolac   Injectable 15 milliGRAM(s) IV Push every 6 hours  lipid, fat emulsion (Fish Oil and Plant Based) 20% Infusion 0.49 Gm/kG/Day (20.83 mL/Hr) IV Continuous <Continuous>  metroNIDAZOLE  IVPB 500 milliGRAM(s) IV Intermittent every 8 hours  pantoprazole  Injectable 40 milliGRAM(s) IV Push daily  Parenteral Nutrition - Adult 1 Each (71 mL/Hr) TPN Continuous <Continuous>  potassium chloride  10 mEq/100 mL IVPB 10 milliEquivalent(s) IV Intermittent every 1 hour  potassium phosphate IVPB 15 milliMole(s) IV Intermittent once    MEDICATIONS  (PRN):  dextrose Oral Gel 15 Gram(s) Oral once PRN Blood Glucose LESS THAN 70 milliGRAM(s)/deciliter  hydrALAZINE Injectable 10 milliGRAM(s) IV Push every 8 hours PRN SBP> 160  HYDROmorphone  Injectable 0.2 milliGRAM(s) IV Push every 4 hours PRN Moderate Pain (4 - 6)  HYDROmorphone  Injectable 0.5 milliGRAM(s) IV Push every 4 hours PRN Severe Pain (7 - 10)  sodium chloride 0.9% lock flush 10 milliLiter(s) IV Push every 1 hour PRN Pre/post blood products, medications, blood draw, and to maintain line patency      Vital Signs Last 24 Hrs  T(C): 36.9 (01 Jul 2023 05:30), Max: 37.7 (30 Jun 2023 21:59)  T(F): 98.4 (01 Jul 2023 05:30), Max: 99.9 (30 Jun 2023 21:59)  HR: 71 (01 Jul 2023 05:30) (67 - 89)  BP: 175/77 (01 Jul 2023 05:30) (152/70 - 220/86)  BP(mean): 110 (01 Jul 2023 05:30) (98 - 124)  RR: 18 (01 Jul 2023 05:30) (16 - 36)  SpO2: 94% (01 Jul 2023 05:30) (91% - 96%)    Parameters below as of 01 Jul 2023 05:19  Patient On (Oxygen Delivery Method): nasal cannula, high flow        Physical Exam  General: NAD, resting comfortably in bed  Pulmonary: Nonlabored breathing, no respiratory distress  CV: NSR  Abd: soft, NT/ND, no guarding, incisions c/d/i  Extremities: (-) enema, warm, well-perfused      I&O's Detail    30 Jun 2023 07:01  -  01 Jul 2023 07:00  --------------------------------------------------------  IN:    Amiodarone: 283.9 mL    Fat Emulsion (Fish Oil &amp; Plant Based) 20% Infusion: 208 mL    IV PiggyBack: 200 mL    IV PiggyBack: 250 mL    IV PiggyBack: 100 mL    IV PiggyBack: 50 mL    IV PiggyBack: 50 mL    IV PiggyBack: 200 mL    TPN (Total Parenteral Nutrition): 1349 mL  Total IN: 2690.9 mL    OUT:    Chest Tube (mL): 175 mL    Fat Emulsion (Fish Oil &amp; Plant Based) 20% Infusion: 0 mL    Indwelling Catheter - Urethral (mL): 5085 mL    Nasogastric/Oral tube (mL): 0 mL    VAC (Vacuum Assisted Closure) System (mL): 0 mL  Total OUT: 5260 mL    Total NET: -2569.1 mL          LABS:                        8.1    17.52 )-----------( 180      ( 01 Jul 2023 05:18 )             24.7     07-01    143  |  106  |  29<H>  ----------------------------<  157<H>  3.5   |  30  |  1.15    Ca    6.8<L>      01 Jul 2023 05:18  Phos  2.2     07-01  Mg     2.4     07-01        Urinalysis Basic - ( 01 Jul 2023 05:18 )    Color: x / Appearance: x / SG: x / pH: x  Gluc: 157 mg/dL / Ketone: x  / Bili: x / Urobili: x   Blood: x / Protein: x / Nitrite: x   Leuk Esterase: x / RBC: x / WBC x   Sq Epi: x / Non Sq Epi: x / Bacteria: x        RADIOLOGY & ADDITIONAL STUDIES:   SUBJECTIVE: Pt seen and examined at bedside this am by surgery team. Reports discomfort with the NGT, pain well controlled. Denies f/n/v/cp/sob. Denies passing gas or BMs. Mental status is much improved - responding to questions appropriately    MEDICATIONS  (STANDING):  acetaminophen   IVPB .. 1000 milliGRAM(s) IV Intermittent every 6 hours  albuterol/ipratropium for Nebulization 3 milliLiter(s) Nebulizer every 6 hours  aMIOdarone Infusion 0.501 mG/Min (16.7 mL/Hr) IV Continuous <Continuous>  cefTRIAXone   IVPB 2000 milliGRAM(s) IV Intermittent every 24 hours  chlorhexidine 2% Cloths 1 Application(s) Topical daily  chlorhexidine 2% Cloths 1 Application(s) Topical <User Schedule>  DAPTOmycin IVPB 800 milliGRAM(s) IV Intermittent every 24 hours  dextrose 5%. 1000 milliLiter(s) (50 mL/Hr) IV Continuous <Continuous>  dextrose 50% Injectable 25 Gram(s) IV Push once  glucagon  Injectable 1 milliGRAM(s) IntraMuscular once  heparin   Injectable 5000 Unit(s) SubCutaneous every 8 hours  insulin lispro (ADMELOG) corrective regimen sliding scale   SubCutaneous every 6 hours  ketorolac   Injectable 15 milliGRAM(s) IV Push every 6 hours  lipid, fat emulsion (Fish Oil and Plant Based) 20% Infusion 0.49 Gm/kG/Day (20.83 mL/Hr) IV Continuous <Continuous>  metroNIDAZOLE  IVPB 500 milliGRAM(s) IV Intermittent every 8 hours  pantoprazole  Injectable 40 milliGRAM(s) IV Push daily  Parenteral Nutrition - Adult 1 Each (71 mL/Hr) TPN Continuous <Continuous>  potassium chloride  10 mEq/100 mL IVPB 10 milliEquivalent(s) IV Intermittent every 1 hour  potassium phosphate IVPB 15 milliMole(s) IV Intermittent once    MEDICATIONS  (PRN):  dextrose Oral Gel 15 Gram(s) Oral once PRN Blood Glucose LESS THAN 70 milliGRAM(s)/deciliter  hydrALAZINE Injectable 10 milliGRAM(s) IV Push every 8 hours PRN SBP> 160  HYDROmorphone  Injectable 0.2 milliGRAM(s) IV Push every 4 hours PRN Moderate Pain (4 - 6)  HYDROmorphone  Injectable 0.5 milliGRAM(s) IV Push every 4 hours PRN Severe Pain (7 - 10)  sodium chloride 0.9% lock flush 10 milliLiter(s) IV Push every 1 hour PRN Pre/post blood products, medications, blood draw, and to maintain line patency      Vital Signs Last 24 Hrs  T(C): 36.9 (01 Jul 2023 05:30), Max: 37.7 (30 Jun 2023 21:59)  T(F): 98.4 (01 Jul 2023 05:30), Max: 99.9 (30 Jun 2023 21:59)  HR: 71 (01 Jul 2023 05:30) (67 - 89)  BP: 175/77 (01 Jul 2023 05:30) (152/70 - 220/86)  BP(mean): 110 (01 Jul 2023 05:30) (98 - 124)  RR: 18 (01 Jul 2023 05:30) (16 - 36)  SpO2: 94% (01 Jul 2023 05:30) (91% - 96%)    Parameters below as of 01 Jul 2023 05:19  Patient On (Oxygen Delivery Method): nasal cannula, high flow        Physical Exam  General: NAD, resting comfortably in bed  Pulmonary: Nonlabored breathing, no respiratory distress, CT in place without a leak, off suction  CV: NSR  Abd: soft, NT/ND, midline incision with VAC in place, NGT with minimal output  Extremities: (-) edema, warm, well-perfused      I&O's Detail    30 Jun 2023 07:01  -  01 Jul 2023 07:00  --------------------------------------------------------  IN:    Amiodarone: 283.9 mL    Fat Emulsion (Fish Oil &amp; Plant Based) 20% Infusion: 208 mL    IV PiggyBack: 200 mL    IV PiggyBack: 250 mL    IV PiggyBack: 100 mL    IV PiggyBack: 50 mL    IV PiggyBack: 50 mL    IV PiggyBack: 200 mL    TPN (Total Parenteral Nutrition): 1349 mL  Total IN: 2690.9 mL    OUT:    Chest Tube (mL): 175 mL    Fat Emulsion (Fish Oil &amp; Plant Based) 20% Infusion: 0 mL    Indwelling Catheter - Urethral (mL): 5085 mL    Nasogastric/Oral tube (mL): 0 mL    VAC (Vacuum Assisted Closure) System (mL): 0 mL  Total OUT: 5260 mL    Total NET: -2569.1 mL          LABS:                        8.1    17.52 )-----------( 180      ( 01 Jul 2023 05:18 )             24.7     07-01    143  |  106  |  29<H>  ----------------------------<  157<H>  3.5   |  30  |  1.15    Ca    6.8<L>      01 Jul 2023 05:18  Phos  2.2     07-01  Mg     2.4     07-01        Urinalysis Basic - ( 01 Jul 2023 05:18 )    Color: x / Appearance: x / SG: x / pH: x  Gluc: 157 mg/dL / Ketone: x  / Bili: x / Urobili: x   Blood: x / Protein: x / Nitrite: x   Leuk Esterase: x / RBC: x / WBC x   Sq Epi: x / Non Sq Epi: x / Bacteria: x        RADIOLOGY & ADDITIONAL STUDIES:

## 2023-07-01 NOTE — PROGRESS NOTE ADULT - ASSESSMENT
78M w/ Crohn's, AFib/Flutter s/p DCCVs, remote ileocectomy and open appy here for resolving SBO vs Crohns flare, s/p NGT decompression. Now s/p laparoscopic lysis of adhesions, converted to open lysis of adhesions, SBR x 3 and left in discontinuity, abthera vac placement 2/27. 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    Plan:  - d/c NGT  - Please discuss with CT surg possible removal of CT  - Out of bed to chair  - Continue excellent SICU care

## 2023-07-01 NOTE — PROGRESS NOTE ADULT - SUBJECTIVE AND OBJECTIVE BOX
ON: additional pain meds for htn, neg 2.5L    SUBJECTIVE: Pt seen and examined at bedside this am by ICU team. Patient is lying comfortably in chair without acute complaints. Reports pain is controlled. Still reports mild SOB.      MEDICATIONS  (STANDING):  albuterol/ipratropium for Nebulization 3 milliLiter(s) Nebulizer every 6 hours  aMIOdarone Infusion 0.501 mG/Min (16.7 mL/Hr) IV Continuous <Continuous>  cefTRIAXone   IVPB 2000 milliGRAM(s) IV Intermittent every 24 hours  chlorhexidine 2% Cloths 1 Application(s) Topical daily  chlorhexidine 2% Cloths 1 Application(s) Topical <User Schedule>  DAPTOmycin IVPB 800 milliGRAM(s) IV Intermittent every 24 hours  dextrose 5%. 1000 milliLiter(s) (50 mL/Hr) IV Continuous <Continuous>  dextrose 50% Injectable 25 Gram(s) IV Push once  glucagon  Injectable 1 milliGRAM(s) IntraMuscular once  heparin   Injectable 5000 Unit(s) SubCutaneous every 8 hours  insulin lispro (ADMELOG) corrective regimen sliding scale   SubCutaneous every 6 hours  ketorolac   Injectable 15 milliGRAM(s) IV Push every 12 hours  lipid, fat emulsion (Fish Oil and Plant Based) 20% Infusion 0.4951 Gm/kG/Day (20.8 mL/Hr) IV Continuous <Continuous>  metroNIDAZOLE  IVPB 500 milliGRAM(s) IV Intermittent every 8 hours  pantoprazole  Injectable 40 milliGRAM(s) IV Push daily  Parenteral Nutrition - Adult 1 Each (71 mL/Hr) TPN Continuous <Continuous>  Parenteral Nutrition - Adult 1 Each (71 mL/Hr) TPN Continuous <Continuous>    MEDICATIONS  (PRN):  dextrose Oral Gel 15 Gram(s) Oral once PRN Blood Glucose LESS THAN 70 milliGRAM(s)/deciliter  hydrALAZINE Injectable 10 milliGRAM(s) IV Push every 6 hours PRN SBP> 160  HYDROmorphone  Injectable 0.2 milliGRAM(s) IV Push every 4 hours PRN Moderate Pain (4 - 6)  HYDROmorphone  Injectable 0.5 milliGRAM(s) IV Push every 4 hours PRN Severe Pain (7 - 10)  sodium chloride 0.9% lock flush 10 milliLiter(s) IV Push every 1 hour PRN Pre/post blood products, medications, blood draw, and to maintain line patency      Drips: Amiodarone    ICU Vital Signs Last 24 Hrs  T(C): 36.8 (01 Jul 2023 14:09), Max: 37.7 (30 Jun 2023 21:59)  T(F): 98.2 (01 Jul 2023 14:09), Max: 99.9 (30 Jun 2023 21:59)  HR: 73 (01 Jul 2023 09:07) (68 - 89)  BP: 149/67 (01 Jul 2023 09:00) (149/67 - 216/86)  BP(mean): 96 (01 Jul 2023 09:00) (96 - 123)  ABP: --  ABP(mean): --  RR: 23 (01 Jul 2023 09:07) (16 - 36)  SpO2: 93% (01 Jul 2023 09:07) (91% - 96%)    O2 Parameters below as of 01 Jul 2023 09:07  Patient On (Oxygen Delivery Method): nasal cannula, high flow  O2 Flow (L/min): 40  O2 Concentration (%): 60        Physical Exam:  General: NAD  HEENT: NC/AT, EOMI, PERRLA, normal hearing, no oral lesions, neck supple w/o LAD, HFNC in nares  Pulmonary: Nonlabored breathing, no respiratory distress, CTA-B  Cardiovascular: NSR, no murmurs  Abdominal: soft, mild to moderate distention, midline wound vac in place with minimal SS output. No rebound, no guarding  Extremities: WWP, 5/5 strength x 4, no clubbing/cyanosis.  Mild edema noted in LE  Neuro: A/O x3, CNs II-XII grossly intact, normal motor/sensation, no focal deficits  : Poole in place draining concentrated urine      Vent settings: HFNC 40%/60      I&O's Summary    30 Jun 2023 07:01  -  01 Jul 2023 07:00  --------------------------------------------------------  IN: 3008.2 mL / OUT: 5475 mL / NET: -2466.8 mL    01 Jul 2023 07:01  -  01 Jul 2023 14:14  --------------------------------------------------------  IN: 158.7 mL / OUT: 385 mL / NET: -226.3 mL        LABS:                        8.1    17.52 )-----------( 180      ( 01 Jul 2023 05:18 )             24.7     07-01    143  |  106  |  29<H>  ----------------------------<  157<H>  3.5   |  30  |  1.15    Ca    6.8<L>      01 Jul 2023 05:18  Phos  2.2     07-01  Mg     2.4     07-01        Urinalysis Basic - ( 01 Jul 2023 05:18 )    Color: x / Appearance: x / SG: x / pH: x  Gluc: 157 mg/dL / Ketone: x  / Bili: x / Urobili: x   Blood: x / Protein: x / Nitrite: x   Leuk Esterase: x / RBC: x / WBC x   Sq Epi: x / Non Sq Epi: x / Bacteria: x      CAPILLARY BLOOD GLUCOSE      POCT Blood Glucose.: 136 mg/dL (01 Jul 2023 12:51)  POCT Blood Glucose.: 146 mg/dL (01 Jul 2023 05:06)  POCT Blood Glucose.: 133 mg/dL (30 Jun 2023 23:47)  POCT Blood Glucose.: 128 mg/dL (30 Jun 2023 17:18)        Cultures:Culture Results:   No growth at 1 day. (06-29 @ 23:02)  Culture Results:   No growth at 1 day. (06-29 @ 23:02)      RADIOLOGY & ADDITIONAL STUDIES:

## 2023-07-01 NOTE — PROGRESS NOTE ADULT - ATTENDING COMMENTS
AF, Crohn's, SBO s/p TRE, SBR x3, peritonitis with ultimate SB anastomosis and closure, metabolic encephalopathy  physical as above  continue TPN  keep fluids even  follow renal function  continue daptomycin/ceftriaxone/flagyl  decrease toradol  DC NGT  encephalopathy improving  decision making of high complexity

## 2023-07-02 NOTE — PROGRESS NOTE ADULT - SUBJECTIVE AND OBJECTIVE BOX
Awake and alert No pain BP high Afeb In RR Good BS ant No LE edema UO good WBCs up Hct decreased Slightly increased creatinine and Na

## 2023-07-02 NOTE — OCCUPATIONAL THERAPY INITIAL EVALUATION ADULT - MODIFIED CLINICAL TEST OF SENSORY INTEGRATION IN BALANCE TEST
Patient functionally side stepped to the R x 5, L x 5, F x 3 and B x 3 with RW and Min A x 2 persons. Patient noted with decreased weight shifting, kyphotic postural control, dyspneic, requiring x 3 30 second standing rest breaks to stand upright and engage in deep breathing techniques. Patient requiring mod verbal cues for proper positioning, posture and safety throughout.

## 2023-07-02 NOTE — PROGRESS NOTE ADULT - ASSESSMENT
Assessment:   78 year old male with a PMHx of Crohn's, HTN, HLD, Afib/flutter (on amiodarone), gout, ileocecectomy who was admitted with resolving SBO vs Crohn's flare s/p NGT decompression s/p laparoscopic lysis of adhesions converted to open with small bowel resection x3 and abthera placed. Thoracic surgery consulted on 6/27/23 for R PTX 2/2 R IJ central line. R pigtail placed at bedside with improved PTX. On 6/28/23 patient returned to OR with general surgery for abdominal closure, small bowel anastomosis and mikel-ileocolic resection. On 6/29 CXR reveals stable, small apical PTX. CT remains on suction.      Plan:  Problem 1: R Pneumothorax.    -2/2 R IJ central line  -s/p R pigtail placed on 6/27/23. Improved PTX on repeat CXR  -extubated at this time   -Keep pigtail in place, continue to monitor output   -Daily CXR  -Thoracic surgery team will continue to follow    Problem 2: Hx of HTN  -On pressors  -Care per primary team    Problem 3: Hx of A-Fib/Flutter  -On amiodarone  -Care per primary team

## 2023-07-02 NOTE — PROGRESS NOTE ADULT - ATTENDING COMMENTS
AF, Crohn's, s/p SBR with ultimate abdominal closure and neoiliecolic  physical as above  tolerating NG T removal  continue daptomycin/ceftriaxone/flagyl  CT of abdomen for increase in WBC though patietn appears nontoxic  HFNC continues with 40% oxygen  transfuse PRBC  creatinine slightly higher; no further ketorolac  UO remains in good range  decision making of high complexity

## 2023-07-02 NOTE — PROGRESS NOTE ADULT - SUBJECTIVE AND OBJECTIVE BOX
overall looks fine  more oriented   no pain  Avss  Increasing WBC  abd soft  serosang from vac  wound clean    CT Chest abd and pelvis  leave mccauley   OOB  pulmonary toilet

## 2023-07-02 NOTE — OCCUPATIONAL THERAPY INITIAL EVALUATION ADULT - IMPAIRED TRANSFERS: SIT/STAND, REHAB EVAL
+Dyspneic- SPO2% 86-91% during transfers and mobility with 6 L O2 via NC/impaired balance/decreased flexibility/impaired postural control

## 2023-07-02 NOTE — PROGRESS NOTE ADULT - ASSESSMENT
78M w/ Crohn's, AFib/Flutter s/p DCCVs, remote ileocectomy and open appy here for resolving SBO vs Crohns flare, s/p NGT decompression. Now s/p laparoscopic lysis of adhesions, converted to open lysis of adhesions, SBR x 3 and left in discontinuity, abthera vac placement 2/27. 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    CT chest AP w/ PO/IV contrast  NPO  DVT ppx  Care per ICU

## 2023-07-02 NOTE — PROGRESS NOTE ADULT - SUBJECTIVE AND OBJECTIVE BOX
Patient discussed on morning rounds with Dr. Zambrano     Operation / Date: R Pigtail placement on 6/27/23    SUBJECTIVE ASSESSMENT:  78y Male seen and examined at bedside with no complaints. Pt denies dizziness, vision changes, chest pain, palpitations, shortness of breath, cough, n/v/d, extremity swelling, calf tenderness.     Vital Signs Last 24 Hrs  T(C): 37.8 (02 Jul 2023 17:55), Max: 37.8 (02 Jul 2023 17:55)  T(F): 100.1 (02 Jul 2023 17:55), Max: 100.1 (02 Jul 2023 17:55)  HR: 82 (02 Jul 2023 17:15) (68 - 96)  BP: 178/80 (02 Jul 2023 17:00) (155/70 - 197/78)  BP(mean): 115 (02 Jul 2023 17:00) (100 - 129)  RR: 24 (02 Jul 2023 17:15) (11 - 49)  SpO2: 91% (02 Jul 2023 17:15) (89% - 97%)    Parameters below as of 02 Jul 2023 18:00  Patient On (Oxygen Delivery Method): nasal cannula w/ humidification  O2 Flow (L/min): 6    I&O's Detail    01 Jul 2023 07:01  -  02 Jul 2023 07:00  --------------------------------------------------------  IN:    Amiodarone: 217.1 mL    Fat Emulsion (Fish Oil &amp; Plant Based) 20% Infusion: 208 mL    TPN (Total Parenteral Nutrition): 994 mL  Total IN: 1419.1 mL    OUT:    Chest Tube (mL): 5 mL    Indwelling Catheter - Urethral (mL): 1400 mL    VAC (Vacuum Assisted Closure) System (mL): 500 mL  Total OUT: 1905 mL    Total NET: -485.9 mL      02 Jul 2023 07:01  -  02 Jul 2023 18:35  --------------------------------------------------------  IN:    Amiodarone: 183.7 mL    dextrose 5%: 720 mL    Fat Emulsion (Fish Oil &amp; Plant Based) 20% Infusion: 42 mL    IV PiggyBack: 100 mL    IV PiggyBack: 250 mL    IV PiggyBack: 50 mL    Oral Fluid: 200 mL    PRBCs (Packed Red Blood Cells): 300 mL    TPN (Total Parenteral Nutrition): 793.3 mL  Total IN: 2639 mL    OUT:    Chest Tube (mL): 90 mL    Fat Emulsion (Fish Oil &amp; Plant Based) 20% Infusion: 0 mL    Indwelling Catheter - Urethral (mL): 1110 mL    VAC (Vacuum Assisted Closure) System (mL): 675 mL  Total OUT: 1875 mL    Total NET: 764 mL    CHEST TUBE:  yes   RAFA DRAIN:  none  EPICARDIAL WIRES: none  TIE DOWNS: yes     PHYSICAL EXAM:      LABS:                        7.7    21.55 )-----------( 198      ( 02 Jul 2023 05:27 )             24.0     07-02    149<H>  |  112<H>  |  38<H>  ----------------------------<  151<H>  3.8   |  31  |  1.30    Ca    7.0<L>      02 Jul 2023 05:27  Phos  2.3     07-02  Mg     2.7     07-0    Urinalysis Basic - ( 02 Jul 2023 05:27 )    Color: x / Appearance: x / SG: x / pH: x  Gluc: 151 mg/dL / Ketone: x  / Bili: x / Urobili: x   Blood: x / Protein: x / Nitrite: x   Leuk Esterase: x / RBC: x / WBC x   Sq Epi: x / Non Sq Epi: x / Bacteria: x    MEDICATIONS  (STANDING):  albuterol/ipratropium for Nebulization 3 milliLiter(s) Nebulizer every 6 hours  aMIOdarone Infusion 0.501 mG/Min (16.7 mL/Hr) IV Continuous <Continuous>  cefTRIAXone   IVPB 2000 milliGRAM(s) IV Intermittent every 24 hours  chlorhexidine 2% Cloths 1 Application(s) Topical daily  chlorhexidine 2% Cloths 1 Application(s) Topical <User Schedule>  DAPTOmycin IVPB 800 milliGRAM(s) IV Intermittent every 24 hours  dexMEDEtomidine Infusion 0.2 MICROgram(s)/kG/Hr (5.05 mL/Hr) IV Continuous <Continuous>  dextrose 5%. 1000 milliLiter(s) (50 mL/Hr) IV Continuous <Continuous>  dextrose 5%. 1000 milliLiter(s) (80 mL/Hr) IV Continuous <Continuous>  dextrose 50% Injectable 25 Gram(s) IV Push once  glucagon  Injectable 1 milliGRAM(s) IntraMuscular once  heparin   Injectable 5000 Unit(s) SubCutaneous every 8 hours  insulin lispro (ADMELOG) corrective regimen sliding scale   SubCutaneous every 6 hours  lipid, fat emulsion (Fish Oil and Plant Based) 20% Infusion 0.4951 Gm/kG/Day (20.8 mL/Hr) IV Continuous <Continuous>  metroNIDAZOLE  IVPB 500 milliGRAM(s) IV Intermittent every 8 hours  pantoprazole  Injectable 40 milliGRAM(s) IV Push daily  Parenteral Nutrition - Adult 1 Each (71 mL/Hr) TPN Continuous <Continuous>  Parenteral Nutrition - Adult 1 Each (83 mL/Hr) TPN Continuous <Continuous>    MEDICATIONS  (PRN):  dextrose Oral Gel 15 Gram(s) Oral once PRN Blood Glucose LESS THAN 70 milliGRAM(s)/deciliter  hydrALAZINE Injectable 20 milliGRAM(s) IV Push every 6 hours PRN SBP> 160  HYDROmorphone  Injectable 0.2 milliGRAM(s) IV Push every 4 hours PRN Moderate Pain (4 - 6)  HYDROmorphone  Injectable 0.5 milliGRAM(s) IV Push every 4 hours PRN Severe Pain (7 - 10)  sodium chloride 0.9% lock flush 10 milliLiter(s) IV Push every 1 hour PRN Pre/post blood products, medications, blood draw, and to maintain line patency    RADIOLOGY & ADDITIONAL TESTS:  < from: CT Chest w/ Oral Cont and w/ IV Cont (07.02.23 @ 12:39) >    IMPRESSION:    Findings of smallbowel malrotation.    Persistent moderate dilatation and thickening of loops of jejunum   proximal to enteric anastomosis within the anterior left upper quadrant,   consistent with early/partial bowel obstruction versus postoperative   ileus as contrast does not pass distal to this transition; follow-up CT   is recommended.    Small loosely loculated fluid collections within the right upper quadrant   and pelvis.    Small bilateral pleural effusions with bibasilar atelectasis.      --- End of Report ---    < end of copied text >     Patient discussed on morning rounds with Dr. Zambrano     Operation / Date: R Pigtail placement on 6/27/23    SUBJECTIVE ASSESSMENT:  78y Male seen and examined at bedside with no complaints. Pt denies dizziness, vision changes, chest pain, palpitations, shortness of breath, cough, n/v/d, extremity swelling, calf tenderness.     Vital Signs Last 24 Hrs  T(C): 37.8 (02 Jul 2023 17:55), Max: 37.8 (02 Jul 2023 17:55)  T(F): 100.1 (02 Jul 2023 17:55), Max: 100.1 (02 Jul 2023 17:55)  HR: 82 (02 Jul 2023 17:15) (68 - 96)  BP: 178/80 (02 Jul 2023 17:00) (155/70 - 197/78)  BP(mean): 115 (02 Jul 2023 17:00) (100 - 129)  RR: 24 (02 Jul 2023 17:15) (11 - 49)  SpO2: 91% (02 Jul 2023 17:15) (89% - 97%)    Parameters below as of 02 Jul 2023 18:00  Patient On (Oxygen Delivery Method): nasal cannula w/ humidification  O2 Flow (L/min): 6    I&O's Detail    01 Jul 2023 07:01  -  02 Jul 2023 07:00  --------------------------------------------------------  IN:    Amiodarone: 217.1 mL    Fat Emulsion (Fish Oil &amp; Plant Based) 20% Infusion: 208 mL    TPN (Total Parenteral Nutrition): 994 mL  Total IN: 1419.1 mL    OUT:    Chest Tube (mL): 5 mL    Indwelling Catheter - Urethral (mL): 1400 mL    VAC (Vacuum Assisted Closure) System (mL): 500 mL  Total OUT: 1905 mL    Total NET: -485.9 mL    02 Jul 2023 07:01  -  02 Jul 2023 18:35  --------------------------------------------------------  IN:    Amiodarone: 183.7 mL    dextrose 5%: 720 mL    Fat Emulsion (Fish Oil &amp; Plant Based) 20% Infusion: 42 mL    IV PiggyBack: 100 mL    IV PiggyBack: 250 mL    IV PiggyBack: 50 mL    Oral Fluid: 200 mL    PRBCs (Packed Red Blood Cells): 300 mL    TPN (Total Parenteral Nutrition): 793.3 mL  Total IN: 2639 mL    OUT:    Chest Tube (mL): 90 mL    Fat Emulsion (Fish Oil &amp; Plant Based) 20% Infusion: 0 mL    Indwelling Catheter - Urethral (mL): 1110 mL    VAC (Vacuum Assisted Closure) System (mL): 675 mL  Total OUT: 1875 mL    Total NET: 764 mL    CHEST TUBE:  yes   RAFA DRAIN:  none  EPICARDIAL WIRES: none  TIE DOWNS: yes     PHYSICAL EXAM:  GENERAL: NAD, lying in bed   HEAD:  Atraumatic, Normocephalic  EYES: EOMI, PERRLA, conjunctiva and sclera clear  ENT: Moist mucous membranes  NECK: Supple, No JVD  CHEST/LUNG: CT in place, no air leak, SS output in drain  HEART: RRR  EXTREMITIES:  2+ Peripheral Pulses, brisk capillary refill. No clubbing, cyanosis, or edema  NERVOUS SYSTEM:  Alert & Oriented X3, speech clear. No deficits     LABS:                        7.7    21.55 )-----------( 198      ( 02 Jul 2023 05:27 )             24.0     07-02    149<H>  |  112<H>  |  38<H>  ----------------------------<  151<H>  3.8   |  31  |  1.30    Ca    7.0<L>      02 Jul 2023 05:27  Phos  2.3     07-02  Mg     2.7     07-0    Urinalysis Basic - ( 02 Jul 2023 05:27 )    Color: x / Appearance: x / SG: x / pH: x  Gluc: 151 mg/dL / Ketone: x  / Bili: x / Urobili: x   Blood: x / Protein: x / Nitrite: x   Leuk Esterase: x / RBC: x / WBC x   Sq Epi: x / Non Sq Epi: x / Bacteria: x    MEDICATIONS  (STANDING):  albuterol/ipratropium for Nebulization 3 milliLiter(s) Nebulizer every 6 hours  aMIOdarone Infusion 0.501 mG/Min (16.7 mL/Hr) IV Continuous <Continuous>  cefTRIAXone   IVPB 2000 milliGRAM(s) IV Intermittent every 24 hours  chlorhexidine 2% Cloths 1 Application(s) Topical daily  chlorhexidine 2% Cloths 1 Application(s) Topical <User Schedule>  DAPTOmycin IVPB 800 milliGRAM(s) IV Intermittent every 24 hours  dexMEDEtomidine Infusion 0.2 MICROgram(s)/kG/Hr (5.05 mL/Hr) IV Continuous <Continuous>  dextrose 5%. 1000 milliLiter(s) (50 mL/Hr) IV Continuous <Continuous>  dextrose 5%. 1000 milliLiter(s) (80 mL/Hr) IV Continuous <Continuous>  dextrose 50% Injectable 25 Gram(s) IV Push once  glucagon  Injectable 1 milliGRAM(s) IntraMuscular once  heparin   Injectable 5000 Unit(s) SubCutaneous every 8 hours  insulin lispro (ADMELOG) corrective regimen sliding scale   SubCutaneous every 6 hours  lipid, fat emulsion (Fish Oil and Plant Based) 20% Infusion 0.4951 Gm/kG/Day (20.8 mL/Hr) IV Continuous <Continuous>  metroNIDAZOLE  IVPB 500 milliGRAM(s) IV Intermittent every 8 hours  pantoprazole  Injectable 40 milliGRAM(s) IV Push daily  Parenteral Nutrition - Adult 1 Each (71 mL/Hr) TPN Continuous <Continuous>  Parenteral Nutrition - Adult 1 Each (83 mL/Hr) TPN Continuous <Continuous>    MEDICATIONS  (PRN):  dextrose Oral Gel 15 Gram(s) Oral once PRN Blood Glucose LESS THAN 70 milliGRAM(s)/deciliter  hydrALAZINE Injectable 20 milliGRAM(s) IV Push every 6 hours PRN SBP> 160  HYDROmorphone  Injectable 0.2 milliGRAM(s) IV Push every 4 hours PRN Moderate Pain (4 - 6)  HYDROmorphone  Injectable 0.5 milliGRAM(s) IV Push every 4 hours PRN Severe Pain (7 - 10)  sodium chloride 0.9% lock flush 10 milliLiter(s) IV Push every 1 hour PRN Pre/post blood products, medications, blood draw, and to maintain line patency    RADIOLOGY & ADDITIONAL TESTS:  < from: CT Chest w/ Oral Cont and w/ IV Cont (07.02.23 @ 12:39) >    IMPRESSION:    Findings of smallbowel malrotation.    Persistent moderate dilatation and thickening of loops of jejunum   proximal to enteric anastomosis within the anterior left upper quadrant,   consistent with early/partial bowel obstruction versus postoperative   ileus as contrast does not pass distal to this transition; follow-up CT   is recommended.    Small loosely loculated fluid collections within the right upper quadrant   and pelvis.    Small bilateral pleural effusions with bibasilar atelectasis.      --- End of Report ---    < end of copied text >

## 2023-07-02 NOTE — PROGRESS NOTE ADULT - ASSESSMENT
Clinically looks much better  To get OOB  Pulm PT and incentive spirometer  Discuss chest tube removal with ICU team as well as transfusion

## 2023-07-02 NOTE — PROGRESS NOTE ADULT - ASSESSMENT
78M w/ Crohn's, AFib/Flutter s/p DCCVs, remote ileocectomy and open appy here for resolving SBO vs Crohns flare, s/p NGT decompression. Now s/p laparoscopic lysis of adhesions, converted to open lysis of adhesions, SBR x 3 and left in discontinuity, abthera vac placement 2/27. 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    Neuro: Agitation: Precedex gtt, Pain: dilaudid PRN, Ofirmev. Stop Toradol, zofran prn for nausea; Hx anxiety: hold venlafaxine. ETOH Hx:  CV: MAP goal > 65; Hx of Afib/flutter: s/p DCCV, amio gtt cont, hold metop; Hx of HLD: hold rasovastatin; TTE (04/22) - PASP 50mmHg, EF 69%. Hx HTN: Hydralazine 20PRN for SBP>160. Monitor HTN.  Pulm: Satting well on HFNC 40%/40 - wean as tolerated. Duonebs and Chest PT q6  GI: TPN, ileus/strict NPO: Hx of Crohn's: s/p ileocectomy, open appy; now open abdomen w/ abthera, after TRE, SBR x3. RTOR 6/28 for mikel-ileocolic resection and small bowel anastamosis, bridging strattice mesh, Abdominal binder at all times. NGT d/c 7.1.  : Poole, ELVI - resolving  ID: CT CAP pending for elevated WBC. perforated viscus, OR Cx: VRE enterococcus: CTX (6/26-), Flagyl (6/26-), Dapto (6/30)  Heme: EBL 1.5L, 3 units pRBC  Endo: ISS; Hx of hypothyroid: cont synthroid  PPx: SCD, SQH   Lines: PIV x 2, Victoria (6/26-30), RUE PICC (6/30--) // DC: R TLC (06/26-30),   Wounds: wound vac - change today  PT/OT: Ordered  Dispo: SICU

## 2023-07-02 NOTE — PROGRESS NOTE ADULT - SUBJECTIVE AND OBJECTIVE BOX
24 hr events:    ON: decrease HFNC to 40/40  7/1: NGT removed, Toradol made q12 due to concern for renal injury, no diuresis today.  TPN ordered. Vac changed.     SUBJECTIVE:  Doing well this AM. NAEON. Denies n/v. Denies f/bm. Denies cp/sob. Pain is well controlled. OOBTC.    MEDICATIONS  (STANDING):  albuterol/ipratropium for Nebulization 3 milliLiter(s) Nebulizer every 6 hours  aMIOdarone Infusion 0.501 mG/Min (16.7 mL/Hr) IV Continuous <Continuous>  benzocaine/menthol Lozenge 1 Lozenge Oral once  cefTRIAXone   IVPB 2000 milliGRAM(s) IV Intermittent every 24 hours  chlorhexidine 2% Cloths 1 Application(s) Topical daily  chlorhexidine 2% Cloths 1 Application(s) Topical <User Schedule>  DAPTOmycin IVPB 800 milliGRAM(s) IV Intermittent every 24 hours  dexMEDEtomidine Infusion 0.2 MICROgram(s)/kG/Hr (5.05 mL/Hr) IV Continuous <Continuous>  dextrose 5%. 1000 milliLiter(s) (50 mL/Hr) IV Continuous <Continuous>  dextrose 5%. 1000 milliLiter(s) (80 mL/Hr) IV Continuous <Continuous>  dextrose 50% Injectable 25 Gram(s) IV Push once  glucagon  Injectable 1 milliGRAM(s) IntraMuscular once  heparin   Injectable 5000 Unit(s) SubCutaneous every 8 hours  insulin lispro (ADMELOG) corrective regimen sliding scale   SubCutaneous every 6 hours  lipid, fat emulsion (Fish Oil and Plant Based) 20% Infusion 0.4951 Gm/kG/Day (20.8 mL/Hr) IV Continuous <Continuous>  metroNIDAZOLE  IVPB 500 milliGRAM(s) IV Intermittent every 8 hours  pantoprazole  Injectable 40 milliGRAM(s) IV Push daily  Parenteral Nutrition - Adult 1 Each (83 mL/Hr) TPN Continuous <Continuous>  Parenteral Nutrition - Adult 1 Each (71 mL/Hr) TPN Continuous <Continuous>    MEDICATIONS  (PRN):  dextrose Oral Gel 15 Gram(s) Oral once PRN Blood Glucose LESS THAN 70 milliGRAM(s)/deciliter  hydrALAZINE Injectable 20 milliGRAM(s) IV Push every 6 hours PRN SBP> 160  HYDROmorphone  Injectable 0.2 milliGRAM(s) IV Push every 4 hours PRN Moderate Pain (4 - 6)  HYDROmorphone  Injectable 0.5 milliGRAM(s) IV Push every 4 hours PRN Severe Pain (7 - 10)  sodium chloride 0.9% lock flush 10 milliLiter(s) IV Push every 1 hour PRN Pre/post blood products, medications, blood draw, and to maintain line patency      Poole:	  [ ] None	[ ] Daily Poole Order Placed	   Indication:	  [ ] Strict I and O's    [ ] Obstruction     [ ] Incontinence + Stage 3 or 4 Decubitus  Central Line:  [ ] None	   [ ]  Medication / TPN Administration     [ ] No Peripheral IV     ICU Vital Signs Last 24 Hrs  T(C): 37.2 (02 Jul 2023 20:00), Max: 37.8 (02 Jul 2023 17:55)  T(F): 98.9 (02 Jul 2023 20:00), Max: 100.1 (02 Jul 2023 17:55)  HR: 85 (02 Jul 2023 20:00) (68 - 96)  BP: 161/118 (02 Jul 2023 20:00) (155/70 - 197/78)  BP(mean): 135 (02 Jul 2023 20:00) (96 - 135)  ABP: --  ABP(mean): --  RR: 34 (02 Jul 2023 20:00) (14 - 49)  SpO2: 90% (02 Jul 2023 20:00) (89% - 96%)    O2 Parameters below as of 02 Jul 2023 20:00  Patient On (Oxygen Delivery Method): nasal cannula w/ humidification  O2 Flow (L/min): 6          Physical Exam:  General: NAD  HEENT: NC/AT, EOMI, normal hearing, no oral lesions, neck supple w/o LAD  Pulmonary: Nonlabored breathing, no respiratory distress, CTA-B  Cardiovascular: NSR, no murmurs  Abdominal: soft, NT/ND, +BS, no organomegaly, wound vac in place  Extremities: WWP, 5/5 strength x 4, no clubbing/cyanosis/edema  Neuro: A/O x3, CNs II-XII grossly intact, normal motor/sensation, no focal deficits  Pulses: palpable distal pulses      I&O's Summary    01 Jul 2023 07:01  -  02 Jul 2023 07:00  --------------------------------------------------------  IN: 1419.1 mL / OUT: 1905 mL / NET: -485.9 mL    02 Jul 2023 07:01  -  02 Jul 2023 20:50  --------------------------------------------------------  IN: 2893.6 mL / OUT: 3175 mL / NET: -281.4 mL        LABS:                        7.7    21.55 )-----------( 198      ( 02 Jul 2023 05:27 )             24.0     07-02    149<H>  |  112<H>  |  38<H>  ----------------------------<  151<H>  3.8   |  31  |  1.30    Ca    7.0<L>      02 Jul 2023 05:27  Phos  2.3     07-02  Mg     2.7     07-02        Urinalysis Basic - ( 02 Jul 2023 05:27 )    Color: x / Appearance: x / SG: x / pH: x  Gluc: 151 mg/dL / Ketone: x  / Bili: x / Urobili: x   Blood: x / Protein: x / Nitrite: x   Leuk Esterase: x / RBC: x / WBC x   Sq Epi: x / Non Sq Epi: x / Bacteria: x      CAPILLARY BLOOD GLUCOSE      POCT Blood Glucose.: 132 mg/dL (02 Jul 2023 18:14)  POCT Blood Glucose.: 158 mg/dL (02 Jul 2023 11:57)  POCT Blood Glucose.: 137 mg/dL (01 Jul 2023 23:02)

## 2023-07-02 NOTE — OCCUPATIONAL THERAPY INITIAL EVALUATION ADULT - DIAGNOSIS, OT EVAL
Patient POD #5 s/p laparoscopic lysis of adhesions, SBR x 3, temp abdominal closure, POD #4 s/p ex lap, removal of abthera, ileocolic resection, presents with decreased overall strength, balance, activity tolerance, postural control impacting independence with functional activities and mobility.

## 2023-07-02 NOTE — OCCUPATIONAL THERAPY INITIAL EVALUATION ADULT - ADDITIONAL COMMENTS
Patient reports living with his wife in an elevator apartment building with no PRATEEK. Patient states he was independent with all ADL's, IADL's and functional mobility with no AD prior to admission. Patient is R hand dominant and has a walk in shower with shower chair.

## 2023-07-02 NOTE — OCCUPATIONAL THERAPY INITIAL EVALUATION ADULT - GENERAL OBSERVATIONS, REHAB EVAL
Rehab aide Juan present. Patient A&Ox4, agreeable and tolerated session fairly. Patient received semisupine in bed +tele, +central line, +CT, +abdominal wound vac, +b/l SCD's, +mccauley cath, 6 L O2 via NC, +moderate swelling of RUE, NAD.

## 2023-07-02 NOTE — PROGRESS NOTE ADULT - SUBJECTIVE AND OBJECTIVE BOX
INTERVAL HPI/OVERNIGHT EVENTS: decrease HFNC to 40/40    STATUS POST:    6/27: Laparoscopic lysis of adhesions, converted to open lysis of adhesions, SBR x 3, temporary abdominal closure, 5L cryst, 1L 5% alb, 3 pRBC, 1 FFP, 1 plts  6/28: ex lap, removal of abthera, ileocolic resection, small bowel anastamosis, , 1.6 crystalloid, 250 5%, 175 uop     SUBJECTIVE: Pt seen and examined at bedside this am by surgery team.     MEDICATIONS  (STANDING):  albuterol/ipratropium for Nebulization 3 milliLiter(s) Nebulizer every 6 hours  aMIOdarone Infusion 0.501 mG/Min (16.7 mL/Hr) IV Continuous <Continuous>  cefTRIAXone   IVPB 2000 milliGRAM(s) IV Intermittent every 24 hours  chlorhexidine 2% Cloths 1 Application(s) Topical daily  chlorhexidine 2% Cloths 1 Application(s) Topical <User Schedule>  DAPTOmycin IVPB 800 milliGRAM(s) IV Intermittent every 24 hours  dextrose 5%. 1000 milliLiter(s) (50 mL/Hr) IV Continuous <Continuous>  dextrose 5%. 1000 milliLiter(s) (80 mL/Hr) IV Continuous <Continuous>  dextrose 50% Injectable 25 Gram(s) IV Push once  diatrizoate meglumine/diatrizoate sodium. 30 milliLiter(s) Oral once  glucagon  Injectable 1 milliGRAM(s) IntraMuscular once  heparin   Injectable 5000 Unit(s) SubCutaneous every 8 hours  insulin lispro (ADMELOG) corrective regimen sliding scale   SubCutaneous every 6 hours  lipid, fat emulsion (Fish Oil and Plant Based) 20% Infusion 0.4951 Gm/kG/Day (20.8 mL/Hr) IV Continuous <Continuous>  lipid, fat emulsion (Fish Oil and Plant Based) 20% Infusion 0.4951 Gm/kG/Day (20.8 mL/Hr) IV Continuous <Continuous>  metroNIDAZOLE  IVPB 500 milliGRAM(s) IV Intermittent every 8 hours  pantoprazole  Injectable 40 milliGRAM(s) IV Push daily  Parenteral Nutrition - Adult 1 Each (71 mL/Hr) TPN Continuous <Continuous>  Parenteral Nutrition - Adult 1 Each (83 mL/Hr) TPN Continuous <Continuous>  potassium phosphate IVPB 15 milliMole(s) IV Intermittent once    MEDICATIONS  (PRN):  dextrose Oral Gel 15 Gram(s) Oral once PRN Blood Glucose LESS THAN 70 milliGRAM(s)/deciliter  hydrALAZINE Injectable 10 milliGRAM(s) IV Push every 6 hours PRN SBP> 160  HYDROmorphone  Injectable 0.2 milliGRAM(s) IV Push every 4 hours PRN Moderate Pain (4 - 6)  HYDROmorphone  Injectable 0.5 milliGRAM(s) IV Push every 4 hours PRN Severe Pain (7 - 10)  sodium chloride 0.9% lock flush 10 milliLiter(s) IV Push every 1 hour PRN Pre/post blood products, medications, blood draw, and to maintain line patency      Vital Signs Last 24 Hrs  T(C): 37.1 (02 Jul 2023 09:55), Max: 37.2 (01 Jul 2023 17:55)  T(F): 98.8 (02 Jul 2023 09:55), Max: 99 (01 Jul 2023 17:55)  HR: 78 (02 Jul 2023 09:00) (68 - 80)  BP: 168/70 (02 Jul 2023 09:00) (155/70 - 197/91)  BP(mean): 101 (02 Jul 2023 09:00) (98 - 130)  RR: 21 (02 Jul 2023 09:00) (11 - 29)  SpO2: 95% (02 Jul 2023 09:00) (93% - 97%)    Parameters below as of 02 Jul 2023 10:00  Patient On (Oxygen Delivery Method): nasal cannula  O2 Flow (L/min): 6      PHYSICAL EXAM:      Constitutional: A&Ox3    Breasts:    Respiratory: non labored breathing, no respiratory distress    Cardiovascular: NSR, RRR    Gastrointestinal:                 Incision:    Genitourinary:    Extremities: (-) edema                  I&O's Detail    01 Jul 2023 07:01  -  02 Jul 2023 07:00  --------------------------------------------------------  IN:    Amiodarone: 217.1 mL    Fat Emulsion (Fish Oil &amp; Plant Based) 20% Infusion: 208 mL    TPN (Total Parenteral Nutrition): 994 mL  Total IN: 1419.1 mL    OUT:    Chest Tube (mL): 5 mL    Indwelling Catheter - Urethral (mL): 1400 mL    VAC (Vacuum Assisted Closure) System (mL): 500 mL  Total OUT: 1905 mL    Total NET: -485.9 mL      02 Jul 2023 07:01  -  02 Jul 2023 10:01  --------------------------------------------------------  IN:    Amiodarone: 33.4 mL    TPN (Total Parenteral Nutrition): 142 mL  Total IN: 175.4 mL    OUT:    Fat Emulsion (Fish Oil &amp; Plant Based) 20% Infusion: 0 mL    Indwelling Catheter - Urethral (mL): 120 mL  Total OUT: 120 mL    Total NET: 55.4 mL          LABS:                        7.7    21.55 )-----------( 198      ( 02 Jul 2023 05:27 )             24.0     07-02    149<H>  |  112<H>  |  38<H>  ----------------------------<  151<H>  3.8   |  31  |  1.30    Ca    7.0<L>      02 Jul 2023 05:27  Phos  2.3     07-02  Mg     2.7     07-02        Urinalysis Basic - ( 02 Jul 2023 05:27 )    Color: x / Appearance: x / SG: x / pH: x  Gluc: 151 mg/dL / Ketone: x  / Bili: x / Urobili: x   Blood: x / Protein: x / Nitrite: x   Leuk Esterase: x / RBC: x / WBC x   Sq Epi: x / Non Sq Epi: x / Bacteria: x        RADIOLOGY & ADDITIONAL STUDIES: INTERVAL HPI/OVERNIGHT EVENTS: decrease HFNC to 40/40    STATUS POST:    6/27: Laparoscopic lysis of adhesions, converted to open lysis of adhesions, SBR x 3, temporary abdominal closure, 5L cryst, 1L 5% alb, 3 pRBC, 1 FFP, 1 plts  6/28: ex lap, removal of abthera, ileocolic resection, small bowel anastamosis, , 1.6 crystalloid, 250 5%, 175 uop     SUBJECTIVE: Pt seen and examined at bedside this am by surgery team. -F/-BM. Denies F, N, V, CP, SOB.    MEDICATIONS  (STANDING):  albuterol/ipratropium for Nebulization 3 milliLiter(s) Nebulizer every 6 hours  aMIOdarone Infusion 0.501 mG/Min (16.7 mL/Hr) IV Continuous <Continuous>  cefTRIAXone   IVPB 2000 milliGRAM(s) IV Intermittent every 24 hours  chlorhexidine 2% Cloths 1 Application(s) Topical daily  chlorhexidine 2% Cloths 1 Application(s) Topical <User Schedule>  DAPTOmycin IVPB 800 milliGRAM(s) IV Intermittent every 24 hours  dextrose 5%. 1000 milliLiter(s) (50 mL/Hr) IV Continuous <Continuous>  dextrose 5%. 1000 milliLiter(s) (80 mL/Hr) IV Continuous <Continuous>  dextrose 50% Injectable 25 Gram(s) IV Push once  diatrizoate meglumine/diatrizoate sodium. 30 milliLiter(s) Oral once  glucagon  Injectable 1 milliGRAM(s) IntraMuscular once  heparin   Injectable 5000 Unit(s) SubCutaneous every 8 hours  insulin lispro (ADMELOG) corrective regimen sliding scale   SubCutaneous every 6 hours  lipid, fat emulsion (Fish Oil and Plant Based) 20% Infusion 0.4951 Gm/kG/Day (20.8 mL/Hr) IV Continuous <Continuous>  lipid, fat emulsion (Fish Oil and Plant Based) 20% Infusion 0.4951 Gm/kG/Day (20.8 mL/Hr) IV Continuous <Continuous>  metroNIDAZOLE  IVPB 500 milliGRAM(s) IV Intermittent every 8 hours  pantoprazole  Injectable 40 milliGRAM(s) IV Push daily  Parenteral Nutrition - Adult 1 Each (71 mL/Hr) TPN Continuous <Continuous>  Parenteral Nutrition - Adult 1 Each (83 mL/Hr) TPN Continuous <Continuous>  potassium phosphate IVPB 15 milliMole(s) IV Intermittent once    MEDICATIONS  (PRN):  dextrose Oral Gel 15 Gram(s) Oral once PRN Blood Glucose LESS THAN 70 milliGRAM(s)/deciliter  hydrALAZINE Injectable 10 milliGRAM(s) IV Push every 6 hours PRN SBP> 160  HYDROmorphone  Injectable 0.2 milliGRAM(s) IV Push every 4 hours PRN Moderate Pain (4 - 6)  HYDROmorphone  Injectable 0.5 milliGRAM(s) IV Push every 4 hours PRN Severe Pain (7 - 10)  sodium chloride 0.9% lock flush 10 milliLiter(s) IV Push every 1 hour PRN Pre/post blood products, medications, blood draw, and to maintain line patency    Vital Signs Last 24 Hrs  T(C): 37.1 (02 Jul 2023 09:55), Max: 37.2 (01 Jul 2023 17:55)  T(F): 98.8 (02 Jul 2023 09:55), Max: 99 (01 Jul 2023 17:55)  HR: 78 (02 Jul 2023 09:00) (68 - 80)  BP: 168/70 (02 Jul 2023 09:00) (155/70 - 197/91)  BP(mean): 101 (02 Jul 2023 09:00) (98 - 130)  RR: 21 (02 Jul 2023 09:00) (11 - 29)  SpO2: 95% (02 Jul 2023 09:00) (93% - 97%)    Parameters below as of 02 Jul 2023 10:00  Patient On (Oxygen Delivery Method): nasal cannula  O2 Flow (L/min): 6    PHYSICAL EXAM:    General: NAD, A&O x3  Pulmonary: Nonlabored breathing, no respiratory distress, on HFNC, Chest tube in place   CV: NSR  Abd: abdomen soft, NT/ND, midline incision with VAC in place w/ SS drainage, no rebound or guarding  : Poole in place w/ concentrated yellow urine   Extremities: WWP, 5/5 strength x 4, no clubbing/cyanosis.  Mild edema noted in LE    I&O's Detail    01 Jul 2023 07:01  -  02 Jul 2023 07:00  --------------------------------------------------------  IN:    Amiodarone: 217.1 mL    Fat Emulsion (Fish Oil &amp; Plant Based) 20% Infusion: 208 mL    TPN (Total Parenteral Nutrition): 994 mL  Total IN: 1419.1 mL    OUT:    Chest Tube (mL): 5 mL    Indwelling Catheter - Urethral (mL): 1400 mL    VAC (Vacuum Assisted Closure) System (mL): 500 mL  Total OUT: 1905 mL    Total NET: -485.9 mL      02 Jul 2023 07:01  -  02 Jul 2023 10:01  --------------------------------------------------------  IN:    Amiodarone: 33.4 mL    TPN (Total Parenteral Nutrition): 142 mL  Total IN: 175.4 mL    OUT:    Fat Emulsion (Fish Oil &amp; Plant Based) 20% Infusion: 0 mL    Indwelling Catheter - Urethral (mL): 120 mL  Total OUT: 120 mL    Total NET: 55.4 mL          LABS:                        7.7    21.55 )-----------( 198      ( 02 Jul 2023 05:27 )             24.0     07-02    149<H>  |  112<H>  |  38<H>  ----------------------------<  151<H>  3.8   |  31  |  1.30    Ca    7.0<L>      02 Jul 2023 05:27  Phos  2.3     07-02  Mg     2.7     07-02        Urinalysis Basic - ( 02 Jul 2023 05:27 )    Color: x / Appearance: x / SG: x / pH: x  Gluc: 151 mg/dL / Ketone: x  / Bili: x / Urobili: x   Blood: x / Protein: x / Nitrite: x   Leuk Esterase: x / RBC: x / WBC x   Sq Epi: x / Non Sq Epi: x / Bacteria: x        RADIOLOGY & ADDITIONAL STUDIES:

## 2023-07-02 NOTE — OCCUPATIONAL THERAPY INITIAL EVALUATION ADULT - PERTINENT HX OF CURRENT PROBLEM, REHAB EVAL
78M w/ Crohn's, AFib/Flutter s/p DCCVs, remote ileocectomy and open appy here for resolving SBO vs Crohns flare, s/p NGT decompression. Now s/p laparoscopic lysis of adhesions, converted to open lysis of adhesions, SBR x 3 and left in discontinuity, abthera vac placement 2/27. 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

## 2023-07-02 NOTE — PROGRESS NOTE ADULT - SUBJECTIVE AND OBJECTIVE BOX
Pt seen and examined   drank whole pitcher of contrast for CT which was ordered for rising wbc  no vomiting  no BMs no flatus    REVIEW OF SYSTEMS:  Constitutional: No fever,  Cardiovascular: No chest pain, palpitations, dizziness or leg swelling  Gastrointestinal: No abdominal or epigastric pain. No nausea, vomiting  Skin: No itching, burning, rashes or lesions       MEDICATIONS:  MEDICATIONS  (STANDING):  albuterol/ipratropium for Nebulization 3 milliLiter(s) Nebulizer every 6 hours  aMIOdarone Infusion 0.501 mG/Min (16.7 mL/Hr) IV Continuous <Continuous>  cefTRIAXone   IVPB 2000 milliGRAM(s) IV Intermittent every 24 hours  chlorhexidine 2% Cloths 1 Application(s) Topical <User Schedule>  chlorhexidine 2% Cloths 1 Application(s) Topical daily  DAPTOmycin IVPB 800 milliGRAM(s) IV Intermittent every 24 hours  dextrose 5%. 1000 milliLiter(s) (80 mL/Hr) IV Continuous <Continuous>  dextrose 5%. 1000 milliLiter(s) (50 mL/Hr) IV Continuous <Continuous>  dextrose 50% Injectable 25 Gram(s) IV Push once  glucagon  Injectable 1 milliGRAM(s) IntraMuscular once  heparin   Injectable 5000 Unit(s) SubCutaneous every 8 hours  insulin lispro (ADMELOG) corrective regimen sliding scale   SubCutaneous every 6 hours  lipid, fat emulsion (Fish Oil and Plant Based) 20% Infusion 0.4951 Gm/kG/Day (20.8 mL/Hr) IV Continuous <Continuous>  metroNIDAZOLE  IVPB 500 milliGRAM(s) IV Intermittent every 8 hours  pantoprazole  Injectable 40 milliGRAM(s) IV Push daily  Parenteral Nutrition - Adult 1 Each (71 mL/Hr) TPN Continuous <Continuous>  Parenteral Nutrition - Adult 1 Each (83 mL/Hr) TPN Continuous <Continuous>    MEDICATIONS  (PRN):  dextrose Oral Gel 15 Gram(s) Oral once PRN Blood Glucose LESS THAN 70 milliGRAM(s)/deciliter  hydrALAZINE Injectable 20 milliGRAM(s) IV Push every 6 hours PRN SBP> 160  HYDROmorphone  Injectable 0.2 milliGRAM(s) IV Push every 4 hours PRN Moderate Pain (4 - 6)  HYDROmorphone  Injectable 0.5 milliGRAM(s) IV Push every 4 hours PRN Severe Pain (7 - 10)  sodium chloride 0.9% lock flush 10 milliLiter(s) IV Push every 1 hour PRN Pre/post blood products, medications, blood draw, and to maintain line patency      Allergies    penicillin (Angioedema)    Intolerances        Vital Signs Last 24 Hrs  T(C): 37.1 (02 Jul 2023 09:55), Max: 37.2 (01 Jul 2023 17:55)  T(F): 98.8 (02 Jul 2023 09:55), Max: 99 (01 Jul 2023 17:55)  HR: 82 (02 Jul 2023 11:00) (68 - 96)  BP: 174/74 (02 Jul 2023 11:00) (155/70 - 197/91)  BP(mean): 107 (02 Jul 2023 11:00) (98 - 130)  RR: 26 (02 Jul 2023 11:00) (11 - 48)  SpO2: 90% (02 Jul 2023 11:00) (90% - 97%)    Parameters below as of 02 Jul 2023 12:00  Patient On (Oxygen Delivery Method): nasal cannula w/ humidification        07-01 @ 07:01  -  07-02 @ 07:00  --------------------------------------------------------  IN: 1419.1 mL / OUT: 1905 mL / NET: -485.9 mL    07-02 @ 07:01  -  07-02 @ 11:38  --------------------------------------------------------  IN: 874.8 mL / OUT: 696 mL / NET: 178.8 mL        PHYSICAL EXAM:    General: in no acute distress  HEENT: MMM, conjunctiva and sclera clear  Gastrointestinal: Soft non-tender non-distended; No bowel sounds; vac dressing  Skin: Warm and dry. No obvious rash    LABS:      CBC Full  -  ( 02 Jul 2023 05:27 )  WBC Count : 21.55 K/uL  RBC Count : 2.54 M/uL  Hemoglobin : 7.7 g/dL  Hematocrit : 24.0 %  Platelet Count - Automated : 198 K/uL  Mean Cell Volume : 94.5 fl  Mean Cell Hemoglobin : 30.3 pg  Mean Cell Hemoglobin Concentration : 32.1 gm/dL  Auto Neutrophil # : x  Auto Lymphocyte # : x  Auto Monocyte # : x  Auto Eosinophil # : x  Auto Basophil # : x  Auto Neutrophil % : x  Auto Lymphocyte % : x  Auto Monocyte % : x  Auto Eosinophil % : x  Auto Basophil % : x    07-02    149<H>  |  112<H>  |  38<H>  ----------------------------<  151<H>  3.8   |  31  |  1.30    Ca    7.0<L>      02 Jul 2023 05:27  Phos  2.3     07-02  Mg     2.7     07-02            Urinalysis Basic - ( 02 Jul 2023 05:27 )    Color: x / Appearance: x / SG: x / pH: x  Gluc: 151 mg/dL / Ketone: x  / Bili: x / Urobili: x   Blood: x / Protein: x / Nitrite: x   Leuk Esterase: x / RBC: x / WBC x   Sq Epi: x / Non Sq Epi: x / Bacteria: x                RADIOLOGY & ADDITIONAL STUDIES (The following images were personally reviewed):

## 2023-07-02 NOTE — OCCUPATIONAL THERAPY INITIAL EVALUATION ADULT - PLANNED THERAPY INTERVENTIONS, OT EVAL
deep breathing techniques/ADL retraining/balance training/bed mobility training/strengthening/transfer training

## 2023-07-03 NOTE — PROGRESS NOTE ADULT - SUBJECTIVE AND OBJECTIVE BOX
INTERVAL/OVERNIGHT EVENTS: Started precedex for agitation overnight.    SUBJECTIVE: This AM, doing well with good pain control. No N/V and has taken some ice chips without problems. No F or BM yet. No CP/SOB.     Neurologic Medications  dexMEDEtomidine Infusion 0.2 MICROgram(s)/kG/Hr IV Continuous <Continuous>  HYDROmorphone  Injectable 0.2 milliGRAM(s) IV Push every 4 hours PRN Moderate Pain (4 - 6)  HYDROmorphone  Injectable 0.5 milliGRAM(s) IV Push every 4 hours PRN Severe Pain (7 - 10)    Respiratory Medications  albuterol/ipratropium for Nebulization 3 milliLiter(s) Nebulizer every 6 hours    Cardiovascular Medications  aMIOdarone Infusion 0.501 mG/Min IV Continuous <Continuous>  hydrALAZINE Injectable 20 milliGRAM(s) IV Push every 6 hours PRN SBP> 160    Gastrointestinal Medications  lipid, fat emulsion (Fish Oil and Plant Based) 20% Infusion 0.4951 Gm/kG/Day IV Continuous <Continuous>  pantoprazole  Injectable 40 milliGRAM(s) IV Push daily  Parenteral Nutrition - Adult 1 Each TPN Continuous <Continuous>    Antimicrobial/Immunologic Medications  cefTRIAXone   IVPB 2000 milliGRAM(s) IV Intermittent every 24 hours  DAPTOmycin IVPB 800 milliGRAM(s) IV Intermittent every 24 hours  metroNIDAZOLE  IVPB 500 milliGRAM(s) IV Intermittent every 8 hours    Endocrine/Metabolic Medications  dextrose 50% Injectable 25 Gram(s) IV Push once  dextrose Oral Gel 15 Gram(s) Oral once PRN Blood Glucose LESS THAN 70 milliGRAM(s)/deciliter  glucagon  Injectable 1 milliGRAM(s) IntraMuscular once  insulin lispro (ADMELOG) corrective regimen sliding scale   SubCutaneous every 6 hours    MEDICATIONS  (PRN):  dextrose Oral Gel 15 Gram(s) Oral once PRN Blood Glucose LESS THAN 70 milliGRAM(s)/deciliter  hydrALAZINE Injectable 20 milliGRAM(s) IV Push every 6 hours PRN SBP> 160  HYDROmorphone  Injectable 0.2 milliGRAM(s) IV Push every 4 hours PRN Moderate Pain (4 - 6)  HYDROmorphone  Injectable 0.5 milliGRAM(s) IV Push every 4 hours PRN Severe Pain (7 - 10)    I&O's Detail    02 Jul 2023 07:01  -  03 Jul 2023 07:00  --------------------------------------------------------  IN:    Amiodarone: 400.8 mL    Dexmedetomidine: 151.2 mL    dextrose 5%: 720 mL    Fat Emulsion (Fish Oil &amp; Plant Based) 20% Infusion: 292 mL    IV PiggyBack: 250 mL    IV PiggyBack: 50 mL    IV PiggyBack: 200 mL    Oral Fluid: 200 mL    PRBCs (Packed Red Blood Cells): 300 mL    TPN (Total Parenteral Nutrition): 1876.2 mL  Total IN: 4440.2 mL    OUT:    Chest Tube (mL): 98 mL    Fat Emulsion (Fish Oil &amp; Plant Based) 20% Infusion: 0 mL    Indwelling Catheter - Urethral (mL): 3585 mL    VAC (Vacuum Assisted Closure) System (mL): 1025 mL  Total OUT: 4708 mL    Total NET: -267.8 mL    03 Jul 2023 07:01  -  03 Jul 2023 15:33  --------------------------------------------------------  IN:    Amiodarone: 116.9 mL    Dexmedetomidine: 25.2 mL    dextrose 5%: 400 mL    Oral Fluid: 240 mL    TPN (Total Parenteral Nutrition): 582.8 mL  Total IN: 1364.9 mL    OUT:    Fat Emulsion (Fish Oil &amp; Plant Based) 20% Infusion: 0 mL    Indwelling Catheter - Urethral (mL): 50 mL    VAC (Vacuum Assisted Closure) System (mL): 100 mL  Total OUT: 150 mL    Total NET: 1214.9 mL    Vital Signs Last 24 Hrs  T(C): 37.1 (03 Jul 2023 09:00), Max: 37.8 (02 Jul 2023 17:55)  T(F): 98.8 (03 Jul 2023 09:00), Max: 100.1 (02 Jul 2023 17:55)  HR: 79 (03 Jul 2023 14:00) (68 - 89)  BP: 168/72 (03 Jul 2023 14:00) (117/57 - 181/77)  BP(mean): 94 (03 Jul 2023 14:00) (80 - 135)  RR: 29 (03 Jul 2023 14:00) (15 - 34)  SpO2: 97% (03 Jul 2023 14:00) (82% - 97%)    Parameters below as of 03 Jul 2023 11:39  Patient On (Oxygen Delivery Method): nasal cannula w/ humidification  O2 Flow (L/min): 6    GENERAL: NAD, resting comfortably in chair, pleasant, AxOx3  HEENT: NCAT, MMM  C/V: Normal rate, normal peripheral perfusion, no murmurs, NSR  PULM: Nonlabored breathing, no respiratory distress, CTA b/l, mild decreased sounds bibasilar  ABD: Soft, distended, mild TTP, incisions CDI, no rebound tenderness, no guarding  EXTREM: WWP, pitting edema, SCDs in place  NEURO: No focal deficits    LABS:                        8.0    23.31 )-----------( 229      ( 03 Jul 2023 05:56 )             25.1     07-03    149<H>  |  112<H>  |  42<H>  ----------------------------<  136<H>  4.1   |  30  |  1.42<H>    Ca    6.8<L>      03 Jul 2023 05:56  Phos  3.6     07-03  Mg     2.7     07-03    TPro  5.3<L>  /  Alb  1.9<L>  /  TBili  3.0<H>  /  DBili  2.2<H>  /  AST  184<H>  /  ALT  71<H>  /  AlkPhos  72  07-03    Urinalysis Basic - ( 03 Jul 2023 05:56 )    Color: x / Appearance: x / SG: x / pH: x  Gluc: 136 mg/dL / Ketone: x  / Bili: x / Urobili: x   Blood: x / Protein: x / Nitrite: x   Leuk Esterase: x / RBC: x / WBC x   Sq Epi: x / Non Sq Epi: x / Bacteria: x    RADIOLOGY & ADDITIONAL STUDIES:  CT Abdomen and Pelvis w/ Oral Cont and w/ IV Cont:   ACC: [id number] EXAM:  CT CHEST OC IC   ORDERED BY: CARLOS CRAIG     ACC: [id number] EXAM:  CT ABDOMEN AND PELVIS OC IC   ORDERED BY: CARLOS CRAIG     *** ADDENDUM # 1 ***    Dr. Moyer discussed the above findings with JOSEPH Engle on 7/2/2023 at 3:20 PM.    --- End of Report ---    *** END OF ADDENDM # 1 ***    PROCEDURE DATE:  07/02/2023      INTERPRETATION:  CLINICAL INFORMATION: 78-year-old with history of bowel   obstruction; elevated white blood cell count. Recent surgery.    COMPARISON: Prior CT exams dated 6/23/2023.    CONTRAST/COMPLICATIONS:  IV Contrast: Isovue 370  95 cc administered   5 cc discarded  Oral Contrast: Omnipaque 300  Complications: None reported at time of study completion    PROCEDURE:  CT of the Chest, Abdomen and Pelvis was performed.  Sagittal and coronal reformats were performed.    FINDINGS:  CHEST:  LUNGS AND LARGE AIRWAYS: Calcified granuloma within the right lower lobe.   Right-sided chest tube in the anterior right lung. There is bibasilar   atelectasis.  PLEURA: There are small bilateral pleural effusions.  VESSELS: No central or segmental pulmonary embolism.  HEART: Heart size is normal. No pericardial effusion. Severe coronary   arterial calcification.  MEDIASTINUM AND NATALIA: There are mildly enlarged lymph nodes measuring up   to 1.2 cm in the prevascular region and 1.4 cm in pretracheal region.  CHEST WALL AND LOWER NECK: Within normal limits.    ABDOMEN AND PELVIS:  LIVER: Within normal limits.  BILE DUCTS: There is vicarious excretion of contrast into the gallbladder.  GALLBLADDER: Within normal limits.  SPLEEN: Within normal limits.  PANCREAS: There is an 11 mm cystic nodule within the tail of the pancreas.  ADRENALS: Within normal limits.  KIDNEYS/URETERS: There are scattered bilateral renal cysts.    BLADDER: Bladder is collapsed around Poole catheter and contains a small   volume of air.  REPRODUCTIVE ORGANS: Prostate gland is not enlarged.    BOWEL: Subtotal colectomy. Small bowel anastomosis within the anterior   left upper quadrant. The duodenum does not cross the spine, consistent   with small bowel malrotation. There is circumferential mural thickening   of moderately dilated loops of jejunum which are dilated to the level of   enteric anastomosis within left upper quadrant without passage of oral   contrast distally (image 102, axial.  PERITONEUM: Loosely loculated fluid collection within the pelvis   measuring 5.1 cm (image 145, axial); loosely loculated fluid collection   in the right upper quadrant measuring 6.3 x 3.8 cm (image 105, axial).  VESSELS: Mild arterial vascular calcification. Anasarca.  RETROPERITONEUM/LYMPH NODES: No lymphadenopathy.  ABDOMINAL WALL: Open abdominal wound.  BONES: Within normal limits.    IMPRESSION:    Findings of smallbowel malrotation.    Persistent moderate dilatation and thickening of loops of jejunum   proximal to enteric anastomosis within the anterior left upper quadrant,   consistent with early/partial bowel obstruction versus postoperative   ileus as contrast does not pass distal to this transition; follow-up CT   is recommended.    Small loosely loculated fluid collections within the right upper quadrant   and pelvis.    Small bilateral pleural effusions with bibasilar atelectasis.    --- End of Report ---    ***Please see the addendum at the top of this report. It may contain   additional important information or changes.****    GUMARO LAWSON MD; Attending Radiologist  This document has been electronically signed. Jul 2 2023  3:08PM  1st Addendum: GUMARO LAWSON MD; Attending Radiologist  The first addendum was electronically signed on: Jul 2 2023  3:27PM. (07-02-23 @ 12:39)    Urinalysis with Rflx Culture (collected 06-30-23 @ 01:54)    Culture - Blood (collected 06-29-23 @ 23:02)  Source: .Blood Blood-Peripheral  Preliminary Report (07-03-23 @ 01:00):    No growth at 3 days.    Culture - Blood (collected 06-29-23 @ 23:02)  Source: .Blood Blood-Peripheral  Preliminary Report (07-03-23 @ 01:00):    No growth at 3 days.

## 2023-07-03 NOTE — PROGRESS NOTE ADULT - SUBJECTIVE AND OBJECTIVE BOX
INFECTIOUS DISEASES CONSULT FOLLOW-UP NOTE    INTERVAL HPI/OVERNIGHT EVENTS:  Patient started on Precedex for agitation, no acute complaints. No pain.       ROS:   Constitutional, eyes, ENT, cardiovascular, respiratory, gastrointestinal, genitourinary, integumentary, neurological, psychiatric and heme/lymph are otherwise negative other than noted above       ANTIBIOTICS/RELEVANT:    MEDICATIONS  (STANDING):  albuterol/ipratropium for Nebulization 3 milliLiter(s) Nebulizer every 6 hours  aMIOdarone Infusion 0.501 mG/Min (16.7 mL/Hr) IV Continuous <Continuous>  cefTRIAXone   IVPB 2000 milliGRAM(s) IV Intermittent every 24 hours  chlorhexidine 2% Cloths 1 Application(s) Topical daily  chlorhexidine 2% Cloths 1 Application(s) Topical <User Schedule>  DAPTOmycin IVPB 800 milliGRAM(s) IV Intermittent every 24 hours  dextrose 5%. 1000 milliLiter(s) (100 mL/Hr) IV Continuous <Continuous>  dextrose 5%. 1000 milliLiter(s) (50 mL/Hr) IV Continuous <Continuous>  dextrose 50% Injectable 25 Gram(s) IV Push once  glucagon  Injectable 1 milliGRAM(s) IntraMuscular once  insulin lispro (ADMELOG) corrective regimen sliding scale   SubCutaneous every 6 hours  lipid, fat emulsion (Fish Oil and Plant Based) 20% Infusion 0.4951 Gm/kG/Day (20.8 mL/Hr) IV Continuous <Continuous>  metroNIDAZOLE  IVPB 500 milliGRAM(s) IV Intermittent every 8 hours  pantoprazole  Injectable 40 milliGRAM(s) IV Push daily  Parenteral Nutrition - Adult 1 Each (83 mL/Hr) TPN Continuous <Continuous>  Parenteral Nutrition - Adult 1 Each (125 mL/Hr) TPN Continuous <Continuous>    MEDICATIONS  (PRN):  dextrose Oral Gel 15 Gram(s) Oral once PRN Blood Glucose LESS THAN 70 milliGRAM(s)/deciliter  hydrALAZINE Injectable 20 milliGRAM(s) IV Push every 6 hours PRN SBP> 160  HYDROmorphone  Injectable 0.2 milliGRAM(s) IV Push every 4 hours PRN Moderate Pain (4 - 6)  HYDROmorphone  Injectable 0.5 milliGRAM(s) IV Push every 4 hours PRN Severe Pain (7 - 10)  sodium chloride 0.9% lock flush 10 milliLiter(s) IV Push every 1 hour PRN Pre/post blood products, medications, blood draw, and to maintain line patency        Vital Signs Last 24 Hrs  T(C): 37.1 (03 Jul 2023 09:00), Max: 37.8 (02 Jul 2023 17:55)  T(F): 98.8 (03 Jul 2023 09:00), Max: 100.1 (02 Jul 2023 17:55)  HR: 74 (03 Jul 2023 16:45) (68 - 89)  BP: 151/65 (03 Jul 2023 16:45) (117/57 - 181/77)  BP(mean): 94 (03 Jul 2023 16:45) (80 - 135)  RR: 24 (03 Jul 2023 16:45) (15 - 34)  SpO2: 95% (03 Jul 2023 16:45) (82% - 97%)    Parameters below as of 03 Jul 2023 16:45  Patient On (Oxygen Delivery Method): nasal cannula w/ humidification  O2 Flow (L/min): 6      07-02-23 @ 07:01  -  07-03-23 @ 07:00  --------------------------------------------------------  IN: 4440.2 mL / OUT: 4708 mL / NET: -267.8 mL    07-03-23 @ 07:01  -  07-03-23 @ 17:37  --------------------------------------------------------  IN: 2565 mL / OUT: 455 mL / NET: 2110 mL      PHYSICAL EXAM:  Constitutional: alert, NAD  Eyes: the sclera and conjunctiva were normal.   ENT: the ears and nose were normal in appearance.   Neck: the appearance of the neck was normal and the neck was supple.   Pulmonary: no respiratory distress and lungs were clear to auscultation bilaterally   Heart: heart rate was normal and rhythm regular, normal S1 and S2  Vascular:. there was no peripheral edema  Abdomen: normal bowel sounds, soft, non-tender, wound vac, drain       LABS:                        8.0    23.31 )-----------( 229      ( 03 Jul 2023 05:56 )             25.1     07-03    149<H>  |  112<H>  |  42<H>  ----------------------------<  136<H>  4.1   |  30  |  1.42<H>    Ca    6.8<L>      03 Jul 2023 05:56  Phos  3.6     07-03  Mg     2.7     07-03    TPro  5.3<L>  /  Alb  1.9<L>  /  TBili  3.0<H>  /  DBili  2.2<H>  /  AST  184<H>  /  ALT  71<H>  /  AlkPhos  72  07-03      Urinalysis Basic - ( 03 Jul 2023 05:56 )    Color: x / Appearance: x / SG: x / pH: x  Gluc: 136 mg/dL / Ketone: x  / Bili: x / Urobili: x   Blood: x / Protein: x / Nitrite: x   Leuk Esterase: x / RBC: x / WBC x   Sq Epi: x / Non Sq Epi: x / Bacteria: x        MICROBIOLOGY:      RADIOLOGY & ADDITIONAL STUDIES:  Reviewed

## 2023-07-03 NOTE — PROGRESS NOTE ADULT - ASSESSMENT
Would D/C chest tube OOB  Cont incentive spirometer and pulmonary PT  Discuss with ICU team and surgery

## 2023-07-03 NOTE — PROGRESS NOTE ADULT - ASSESSMENT
CT noted  ID consult on board  removal of chest tube planned  advise patient to go slow on po due to ileus pattern  mobilize

## 2023-07-03 NOTE — PROGRESS NOTE ADULT - SUBJECTIVE AND OBJECTIVE BOX
Awake and alert BP good Afeb this am In RR Good BS Abd with occas BS No LE edema  WBCs up Creatinine slightly up  LFTs up

## 2023-07-03 NOTE — PROGRESS NOTE ADULT - ASSESSMENT
78M POD6 laparoscopic lysis of adhesions, converted to open lysis of adhesions, SBR x 3, temporary abdominal closure, POD5 ex lap, removal of abthera, ileocolic resection, small bowel anastamosis.    Care per primary team  Surgery team 4c will continue to follow

## 2023-07-03 NOTE — PROGRESS NOTE ADULT - ATTENDING COMMENTS
#anastomosis leak, intraabdominal collection, enterooccus infection, leukocytosis    Had 100.1 temp, WBC uptrending to 23.  CT a/p showed 6.3 x 3.8cm fluid collection - I wonder if this is infected and as the source of leukocytosis.  Cont dapto/CTX/flagyl.  Consider IR guided drainage of the fluid collection.    Team 1 will follow you.  Case d/w primary team.    Angie Frye MD, MS  Infectious Disease attending  work cell [phone]   For any questions during evening/weekend/holiday, please page ID on call

## 2023-07-03 NOTE — PROGRESS NOTE ADULT - SUBJECTIVE AND OBJECTIVE BOX
Patient discussed on morning rounds with Dr. Gallego    Operation / Date: pigtail placed for ptx     SUBJECTIVE ASSESSMENT:  78y Male seen and examined at the bedside. Feels well, denies SOB, GABRIEL. Happy chest tube will be removed         Vital Signs Last 24 Hrs  T(C): 37.1 (03 Jul 2023 09:00), Max: 37.8 (02 Jul 2023 17:55)  T(F): 98.8 (03 Jul 2023 09:00), Max: 100.1 (02 Jul 2023 17:55)  HR: 79 (03 Jul 2023 14:00) (68 - 89)  BP: 168/72 (03 Jul 2023 14:00) (117/57 - 181/77)  BP(mean): 94 (03 Jul 2023 14:00) (80 - 135)  RR: 29 (03 Jul 2023 14:00) (15 - 34)  SpO2: 97% (03 Jul 2023 14:00) (82% - 97%)    Parameters below as of 03 Jul 2023 11:39  Patient On (Oxygen Delivery Method): nasal cannula w/ humidification  O2 Flow (L/min): 6    I&O's Detail    02 Jul 2023 07:01  -  03 Jul 2023 07:00  --------------------------------------------------------  IN:    Amiodarone: 400.8 mL    Dexmedetomidine: 151.2 mL    dextrose 5%: 720 mL    Fat Emulsion (Fish Oil &amp; Plant Based) 20% Infusion: 292 mL    IV PiggyBack: 250 mL    IV PiggyBack: 50 mL    IV PiggyBack: 200 mL    Oral Fluid: 200 mL    PRBCs (Packed Red Blood Cells): 300 mL    TPN (Total Parenteral Nutrition): 1876.2 mL  Total IN: 4440.2 mL    OUT:    Chest Tube (mL): 98 mL    Fat Emulsion (Fish Oil &amp; Plant Based) 20% Infusion: 0 mL    Indwelling Catheter - Urethral (mL): 3585 mL    VAC (Vacuum Assisted Closure) System (mL): 1025 mL  Total OUT: 4708 mL    Total NET: -267.8 mL      03 Jul 2023 07:01  -  03 Jul 2023 15:03  --------------------------------------------------------  IN:    Amiodarone: 116.9 mL    Dexmedetomidine: 25.2 mL    dextrose 5%: 400 mL    Oral Fluid: 240 mL    TPN (Total Parenteral Nutrition): 582.8 mL  Total IN: 1364.9 mL    OUT:    Fat Emulsion (Fish Oil &amp; Plant Based) 20% Infusion: 0 mL    Indwelling Catheter - Urethral (mL): 50 mL    VAC (Vacuum Assisted Closure) System (mL): 100 mL  Total OUT: 150 mL    Total NET: 1214.9 mL          CHEST TUBE:  Yes/No. AIR LEAKS: Yes/No. Suction / H2O SEAL.   RAFA DRAIN:  Yes/No.  EPICARDIAL WIRES: Yes/No.  TIE DOWNS: Yes/No.  ISRAEL: Yes/No.    PHYSICAL EXAM:    General:     Neurological:    Cardiovascular:    Respiratory:    Gastrointestinal:    Extremities:    Vascular:    Incision Sites:    LABS:                        8.0    23.31 )-----------( 229      ( 03 Jul 2023 05:56 )             25.1       COUMADIN:  Yes/No. REASON: .        07-03    149<H>  |  112<H>  |  42<H>  ----------------------------<  136<H>  4.1   |  30  |  1.42<H>    Ca    6.8<L>      03 Jul 2023 05:56  Phos  3.6     07-03  Mg     2.7     07-03    TPro  5.3<L>  /  Alb  1.9<L>  /  TBili  3.0<H>  /  DBili  2.2<H>  /  AST  184<H>  /  ALT  71<H>  /  AlkPhos  72  07-03      Urinalysis Basic - ( 03 Jul 2023 05:56 )    Color: x / Appearance: x / SG: x / pH: x  Gluc: 136 mg/dL / Ketone: x  / Bili: x / Urobili: x   Blood: x / Protein: x / Nitrite: x   Leuk Esterase: x / RBC: x / WBC x   Sq Epi: x / Non Sq Epi: x / Bacteria: x        MEDICATIONS  (STANDING):  albuterol/ipratropium for Nebulization 3 milliLiter(s) Nebulizer every 6 hours  aMIOdarone Infusion 0.501 mG/Min (16.7 mL/Hr) IV Continuous <Continuous>  cefTRIAXone   IVPB 2000 milliGRAM(s) IV Intermittent every 24 hours  chlorhexidine 2% Cloths 1 Application(s) Topical <User Schedule>  chlorhexidine 2% Cloths 1 Application(s) Topical daily  DAPTOmycin IVPB 800 milliGRAM(s) IV Intermittent every 24 hours  dexMEDEtomidine Infusion 0.2 MICROgram(s)/kG/Hr (5.05 mL/Hr) IV Continuous <Continuous>  dextrose 5%. 1000 milliLiter(s) (100 mL/Hr) IV Continuous <Continuous>  dextrose 5%. 1000 milliLiter(s) (50 mL/Hr) IV Continuous <Continuous>  dextrose 50% Injectable 25 Gram(s) IV Push once  glucagon  Injectable 1 milliGRAM(s) IntraMuscular once  insulin lispro (ADMELOG) corrective regimen sliding scale   SubCutaneous every 6 hours  lipid, fat emulsion (Fish Oil and Plant Based) 20% Infusion 0.4951 Gm/kG/Day (20.8 mL/Hr) IV Continuous <Continuous>  metroNIDAZOLE  IVPB 500 milliGRAM(s) IV Intermittent every 8 hours  pantoprazole  Injectable 40 milliGRAM(s) IV Push daily  Parenteral Nutrition - Adult 1 Each (125 mL/Hr) TPN Continuous <Continuous>  Parenteral Nutrition - Adult 1 Each (83 mL/Hr) TPN Continuous <Continuous>    MEDICATIONS  (PRN):  dextrose Oral Gel 15 Gram(s) Oral once PRN Blood Glucose LESS THAN 70 milliGRAM(s)/deciliter  hydrALAZINE Injectable 20 milliGRAM(s) IV Push every 6 hours PRN SBP> 160  HYDROmorphone  Injectable 0.2 milliGRAM(s) IV Push every 4 hours PRN Moderate Pain (4 - 6)  HYDROmorphone  Injectable 0.5 milliGRAM(s) IV Push every 4 hours PRN Severe Pain (7 - 10)  sodium chloride 0.9% lock flush 10 milliLiter(s) IV Push every 1 hour PRN Pre/post blood products, medications, blood draw, and to maintain line patency        RADIOLOGY & ADDITIONAL TESTS:     Patient discussed on morning rounds with Dr. Gallego    Operation / Date: pigtail placed for ptx     SUBJECTIVE ASSESSMENT:  78y Male seen and examined at the bedside. Feels well, denies SOB, GABRIEL. Happy chest tube will be removed         Vital Signs Last 24 Hrs  T(C): 37.1 (03 Jul 2023 09:00), Max: 37.8 (02 Jul 2023 17:55)  T(F): 98.8 (03 Jul 2023 09:00), Max: 100.1 (02 Jul 2023 17:55)  HR: 79 (03 Jul 2023 14:00) (68 - 89)  BP: 168/72 (03 Jul 2023 14:00) (117/57 - 181/77)  BP(mean): 94 (03 Jul 2023 14:00) (80 - 135)  RR: 29 (03 Jul 2023 14:00) (15 - 34)  SpO2: 97% (03 Jul 2023 14:00) (82% - 97%)    Parameters below as of 03 Jul 2023 11:39  Patient On (Oxygen Delivery Method): nasal cannula w/ humidification  O2 Flow (L/min): 6    I&O's Detail    02 Jul 2023 07:01  -  03 Jul 2023 07:00  --------------------------------------------------------  IN:    Amiodarone: 400.8 mL    Dexmedetomidine: 151.2 mL    dextrose 5%: 720 mL    Fat Emulsion (Fish Oil &amp; Plant Based) 20% Infusion: 292 mL    IV PiggyBack: 250 mL    IV PiggyBack: 50 mL    IV PiggyBack: 200 mL    Oral Fluid: 200 mL    PRBCs (Packed Red Blood Cells): 300 mL    TPN (Total Parenteral Nutrition): 1876.2 mL  Total IN: 4440.2 mL    OUT:    Chest Tube (mL): 98 mL    Fat Emulsion (Fish Oil &amp; Plant Based) 20% Infusion: 0 mL    Indwelling Catheter - Urethral (mL): 3585 mL    VAC (Vacuum Assisted Closure) System (mL): 1025 mL  Total OUT: 4708 mL    Total NET: -267.8 mL      03 Jul 2023 07:01  -  03 Jul 2023 15:03  --------------------------------------------------------  IN:    Amiodarone: 116.9 mL    Dexmedetomidine: 25.2 mL    dextrose 5%: 400 mL    Oral Fluid: 240 mL    TPN (Total Parenteral Nutrition): 582.8 mL  Total IN: 1364.9 mL    OUT:    Fat Emulsion (Fish Oil &amp; Plant Based) 20% Infusion: 0 mL    Indwelling Catheter - Urethral (mL): 50 mL    VAC (Vacuum Assisted Closure) System (mL): 100 mL  Total OUT: 150 mL    Total NET: 1214.9 mL          CHEST TUBE:  no  RAFA DRAIN:  no.  EPICARDIAL WIRES: no  TIE DOWNS: no  ISRAEL: no.    PHYSICAL EXAM:  General: Sitting in bedNAD  Head NCAT   ENT: MMM   neck: supple   Neurological: A&O x3, no focal deficits, strength 5/5 throughout  Cardiovascular: RRR  Respiratory: Non-labored breathing,  nasal cannula  Extremities: WWP     LABS:                        8.0    23.31 )-----------( 229      ( 03 Jul 2023 05:56 )             25.1       COUMADIN:  no      07-03    149<H>  |  112<H>  |  42<H>  ----------------------------<  136<H>  4.1   |  30  |  1.42<H>    Ca    6.8<L>      03 Jul 2023 05:56  Phos  3.6     07-03  Mg     2.7     07-03    TPro  5.3<L>  /  Alb  1.9<L>  /  TBili  3.0<H>  /  DBili  2.2<H>  /  AST  184<H>  /  ALT  71<H>  /  AlkPhos  72  07-03      Urinalysis Basic - ( 03 Jul 2023 05:56 )    Color: x / Appearance: x / SG: x / pH: x  Gluc: 136 mg/dL / Ketone: x  / Bili: x / Urobili: x   Blood: x / Protein: x / Nitrite: x   Leuk Esterase: x / RBC: x / WBC x   Sq Epi: x / Non Sq Epi: x / Bacteria: x        MEDICATIONS  (STANDING):  albuterol/ipratropium for Nebulization 3 milliLiter(s) Nebulizer every 6 hours  aMIOdarone Infusion 0.501 mG/Min (16.7 mL/Hr) IV Continuous <Continuous>  cefTRIAXone   IVPB 2000 milliGRAM(s) IV Intermittent every 24 hours  chlorhexidine 2% Cloths 1 Application(s) Topical <User Schedule>  chlorhexidine 2% Cloths 1 Application(s) Topical daily  DAPTOmycin IVPB 800 milliGRAM(s) IV Intermittent every 24 hours  dexMEDEtomidine Infusion 0.2 MICROgram(s)/kG/Hr (5.05 mL/Hr) IV Continuous <Continuous>  dextrose 5%. 1000 milliLiter(s) (100 mL/Hr) IV Continuous <Continuous>  dextrose 5%. 1000 milliLiter(s) (50 mL/Hr) IV Continuous <Continuous>  dextrose 50% Injectable 25 Gram(s) IV Push once  glucagon  Injectable 1 milliGRAM(s) IntraMuscular once  insulin lispro (ADMELOG) corrective regimen sliding scale   SubCutaneous every 6 hours  lipid, fat emulsion (Fish Oil and Plant Based) 20% Infusion 0.4951 Gm/kG/Day (20.8 mL/Hr) IV Continuous <Continuous>  metroNIDAZOLE  IVPB 500 milliGRAM(s) IV Intermittent every 8 hours  pantoprazole  Injectable 40 milliGRAM(s) IV Push daily  Parenteral Nutrition - Adult 1 Each (125 mL/Hr) TPN Continuous <Continuous>  Parenteral Nutrition - Adult 1 Each (83 mL/Hr) TPN Continuous <Continuous>    MEDICATIONS  (PRN):  dextrose Oral Gel 15 Gram(s) Oral once PRN Blood Glucose LESS THAN 70 milliGRAM(s)/deciliter  hydrALAZINE Injectable 20 milliGRAM(s) IV Push every 6 hours PRN SBP> 160  HYDROmorphone  Injectable 0.2 milliGRAM(s) IV Push every 4 hours PRN Moderate Pain (4 - 6)  HYDROmorphone  Injectable 0.5 milliGRAM(s) IV Push every 4 hours PRN Severe Pain (7 - 10)  sodium chloride 0.9% lock flush 10 milliLiter(s) IV Push every 1 hour PRN Pre/post blood products, medications, blood draw, and to maintain line patency        RADIOLOGY & ADDITIONAL TESTS:    < from: Xray Chest 1 View- PORTABLE-Urgent (Xray Chest 1 View- PORTABLE-Urgent .) (07.03.23 @ 10:47) >  Frontal chest.    COMPARISON: July 3, 2023 time 8:27 AM.    Findings/  impression:: Interim right chest tube, no pneumothorax. Stable position   right PICC line and left loop recorder.. Bibasilar opacity/pleural   pleural effusions, decreased. Heart and mediastinum are unremarkable.   Stable bony structures. Right humeral surgical anchors.    < end of copied text >   Continue to use Mupirocin on affected areas. Detail Level: Simple

## 2023-07-03 NOTE — PROGRESS NOTE ADULT - SUBJECTIVE AND OBJECTIVE BOX
SUBJECTIVE: Patient seen at bed side, AOx3, denies having bowel movements.     MEDICATIONS  (STANDING):  albuterol/ipratropium for Nebulization 3 milliLiter(s) Nebulizer every 6 hours  aMIOdarone Infusion 0.501 mG/Min (16.7 mL/Hr) IV Continuous <Continuous>  cefTRIAXone   IVPB 2000 milliGRAM(s) IV Intermittent every 24 hours  chlorhexidine 2% Cloths 1 Application(s) Topical <User Schedule>  chlorhexidine 2% Cloths 1 Application(s) Topical daily  DAPTOmycin IVPB 800 milliGRAM(s) IV Intermittent every 24 hours  dexMEDEtomidine Infusion 0.2 MICROgram(s)/kG/Hr (5.05 mL/Hr) IV Continuous <Continuous>  dextrose 5%. 1000 milliLiter(s) (50 mL/Hr) IV Continuous <Continuous>  dextrose 50% Injectable 25 Gram(s) IV Push once  glucagon  Injectable 1 milliGRAM(s) IntraMuscular once  heparin   Injectable 5000 Unit(s) SubCutaneous every 8 hours  insulin lispro (ADMELOG) corrective regimen sliding scale   SubCutaneous every 6 hours  lipid, fat emulsion (Fish Oil and Plant Based) 20% Infusion 0.4951 Gm/kG/Day (20.8 mL/Hr) IV Continuous <Continuous>  metroNIDAZOLE  IVPB 500 milliGRAM(s) IV Intermittent every 8 hours  pantoprazole  Injectable 40 milliGRAM(s) IV Push daily  Parenteral Nutrition - Adult 1 Each (83 mL/Hr) TPN Continuous <Continuous>    MEDICATIONS  (PRN):  dextrose Oral Gel 15 Gram(s) Oral once PRN Blood Glucose LESS THAN 70 milliGRAM(s)/deciliter  hydrALAZINE Injectable 20 milliGRAM(s) IV Push every 6 hours PRN SBP> 160  HYDROmorphone  Injectable 0.2 milliGRAM(s) IV Push every 4 hours PRN Moderate Pain (4 - 6)  HYDROmorphone  Injectable 0.5 milliGRAM(s) IV Push every 4 hours PRN Severe Pain (7 - 10)  sodium chloride 0.9% lock flush 10 milliLiter(s) IV Push every 1 hour PRN Pre/post blood products, medications, blood draw, and to maintain line patency      Drips:     ICU Vital Signs Last 24 Hrs  T(C): 37.1 (03 Jul 2023 09:00), Max: 37.8 (02 Jul 2023 17:55)  T(F): 98.8 (03 Jul 2023 09:00), Max: 100.1 (02 Jul 2023 17:55)  HR: 71 (03 Jul 2023 09:00) (68 - 89)  BP: 123/59 (03 Jul 2023 09:00) (117/57 - 195/84)  BP(mean): 85 (03 Jul 2023 09:00) (80 - 135)  ABP: --  ABP(mean): --  RR: 26 (03 Jul 2023 09:00) (14 - 34)  SpO2: 91% (03 Jul 2023 09:00) (82% - 97%)    O2 Parameters below as of 03 Jul 2023 09:00  Patient On (Oxygen Delivery Method): nasal cannula w/ humidification  O2 Flow (L/min): 6        Physical Exam:  General: NAD, resting comfortably in bed  Pulmonary: Nonlabored breathing, no respiratory distress, CT in place without a leak, off suction  CV: NSR  Abd: soft, NT/ND, midline incision with VAC in place  Extremities: (-) edema, warm, well-perfused      Poole: clear urine     I&O's Summary    02 Jul 2023 07:01  -  03 Jul 2023 07:00  --------------------------------------------------------  IN: 4440.2 mL / OUT: 4708 mL / NET: -267.8 mL    03 Jul 2023 07:01  -  03 Jul 2023 10:18  --------------------------------------------------------  IN: 112.6 mL / OUT: 50 mL / NET: 62.6 mL        LABS:                        8.0    23.31 )-----------( 229      ( 03 Jul 2023 05:56 )             25.1     07-03    149<H>  |  112<H>  |  42<H>  ----------------------------<  136<H>  4.1   |  30  |  1.42<H>    Ca    6.8<L>      03 Jul 2023 05:56  Phos  3.6     07-03  Mg     2.7     07-03    TPro  5.3<L>  /  Alb  1.9<L>  /  TBili  3.0<H>  /  DBili  2.2<H>  /  AST  184<H>  /  ALT  71<H>  /  AlkPhos  72  07-03      Urinalysis Basic - ( 03 Jul 2023 05:56 )    Color: x / Appearance: x / SG: x / pH: x  Gluc: 136 mg/dL / Ketone: x  / Bili: x / Urobili: x   Blood: x / Protein: x / Nitrite: x   Leuk Esterase: x / RBC: x / WBC x   Sq Epi: x / Non Sq Epi: x / Bacteria: x      CAPILLARY BLOOD GLUCOSE      POCT Blood Glucose.: 149 mg/dL (02 Jul 2023 22:54)  POCT Blood Glucose.: 132 mg/dL (02 Jul 2023 18:14)  POCT Blood Glucose.: 158 mg/dL (02 Jul 2023 11:57)    LIVER FUNCTIONS - ( 03 Jul 2023 05:56 )  Alb: 1.9 g/dL / Pro: 5.3 g/dL / ALK PHOS: 72 U/L / ALT: 71 U/L / AST: 184 U/L / GGT: x

## 2023-07-03 NOTE — PROGRESS NOTE ADULT - ASSESSMENT
78M w/ Crohn's, AFib/Flutter s/p DCCVs, remote ileocectomy and open appy here for resolving SBO vs Crohns flare, s/p NGT decompression. Now s/p laparoscopic lysis of adhesions, converted to open lysis of adhesions, SBR x 3 and left in discontinuity, abthera vac placement 6/27. 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 Extubated POD1, with increasing leukocytosis and intermittent confusion/agitation.    Neuro: Agitation: wean off Precedex gtt this AM, Pain: dilaudid PRN, Ofirmev. Stop Toradol for renal toxicity, zofran prn for nausea; Hx anxiety: hold venlafaxine. ETOH Hx:  CV: MAP goal > 65; Hx of Afib/flutter: s/p DCCV, amio gtt cont, hold metop; Hx of HLD: hold rasovastatin; TTE (04/22) - PASP 50mmHg, EF 69%. Hx HTN: Hydralazine 20PRN for SBP>160. Monitor HTN.  Pulm: HFNC weaned to 6L NC, continue to wean. Duonebs and Chest PT q6. Chest tube removed by CT Surgery.   GI: TPN, ileus/strict NPO: Hx of Crohn's: s/p ileocectomy, open appy; now open abdomen w/ abthera, after TRE, SBR x3. RTOR 6/28 for mikel-ileocolic resection and small bowel anastamosis, bridging strattice mesh, Abdominal binder at all times. Pt had a large BM in the afternoon, will start CLD.   : Poole, ELVI - resolving.  ID: Perforated viscus, OR Cx: Vanc-resistant/amp-sens (but allergic) enterococcus: CTX (6/26-), Flagyl (6/26-), Dapto (6/30-). Leukocytosis: IR reviewed images, unable to access pelvic collection, will aspirate perihepatic collection +/- drain depending of output.  Heme: OR EBL 1.5L, 3 units pRBC.  Endo: ISS; Hx of hypothyroid: cont synthroid.  PPx: SCD, SQH.   Lines: PIV x 2, Victoria (6/26-30), RUE PICC (6/30--) // DC: R Chest tube (7/3--), R TLC (06/26-30).  Wounds: wound vac change Tuesday  PT/OT: Ordered  Dispo: SICU

## 2023-07-03 NOTE — PROGRESS NOTE ADULT - ASSESSMENT
78M S/P laparoscopic lysis of adhesions, converted to open lysis of adhesions, SBR x 3, temporary abdominal closure, POD5 ex lap, removal of abthera, ileocolic resection, small bowel anastomosis. Doing better, more alert and oriented.     Plan:  - Can start sips of clears and ice chips   - Remove Poole   - Follow ID recommendations   - Discuss with CT Chest tube removal   - Continue care in SICU

## 2023-07-03 NOTE — PROGRESS NOTE ADULT - SUBJECTIVE AND OBJECTIVE BOX
INTERVAL HPI/OVERNIGHT EVENTS: Started precedex for agitation; BCx - ngtd x 3d.    STATUS POST:    6/27: Laparoscopic lysis of adhesions, converted to open lysis of adhesions, SBR x 3, temporary abdominal closure  6/28: ex lap, removal of abthera, ileocolic resection, small bowel anastamosis    SUBJECTIVE: Patient seen and examined at bedside. Agitation is improved, AOx3, appears in good spirits. Denies abdominal pain, n/v, cp, sob.      aMIOdarone Infusion 0.501 mG/Min IV Continuous <Continuous>  cefTRIAXone   IVPB 2000 milliGRAM(s) IV Intermittent every 24 hours  DAPTOmycin IVPB 800 milliGRAM(s) IV Intermittent every 24 hours  heparin   Injectable 5000 Unit(s) SubCutaneous every 8 hours  hydrALAZINE Injectable 20 milliGRAM(s) IV Push every 6 hours PRN  metroNIDAZOLE  IVPB 500 milliGRAM(s) IV Intermittent every 8 hours      Vital Signs Last 24 Hrs  T(C): 36.7 (03 Jul 2023 05:17), Max: 37.8 (02 Jul 2023 17:55)  T(F): 98 (03 Jul 2023 05:17), Max: 100.1 (02 Jul 2023 17:55)  HR: 70 (03 Jul 2023 08:00) (68 - 96)  BP: 120/56 (03 Jul 2023 08:00) (117/57 - 195/84)  BP(mean): 80 (03 Jul 2023 08:00) (80 - 135)  RR: 23 (03 Jul 2023 08:00) (14 - 49)  SpO2: 90% (03 Jul 2023 08:00) (82% - 97%)    Parameters below as of 03 Jul 2023 08:00  Patient On (Oxygen Delivery Method): nasal cannula w/ humidification  O2 Flow (L/min): 6    I&O's Detail    02 Jul 2023 07:01  -  03 Jul 2023 07:00  --------------------------------------------------------  IN:    Amiodarone: 400.8 mL    Dexmedetomidine: 151.2 mL    dextrose 5%: 720 mL    Fat Emulsion (Fish Oil &amp; Plant Based) 20% Infusion: 292 mL    IV PiggyBack: 250 mL    IV PiggyBack: 50 mL    IV PiggyBack: 200 mL    Oral Fluid: 200 mL    PRBCs (Packed Red Blood Cells): 300 mL    TPN (Total Parenteral Nutrition): 1876.2 mL  Total IN: 4440.2 mL    OUT:    Chest Tube (mL): 98 mL    Fat Emulsion (Fish Oil &amp; Plant Based) 20% Infusion: 0 mL    Indwelling Catheter - Urethral (mL): 3585 mL    VAC (Vacuum Assisted Closure) System (mL): 1025 mL  Total OUT: 4708 mL    Total NET: -267.8 mL      03 Jul 2023 07:01  -  03 Jul 2023 08:49  --------------------------------------------------------  IN:    Amiodarone: 16.7 mL    Dexmedetomidine: 12.6 mL    TPN (Total Parenteral Nutrition): 83.3 mL  Total IN: 112.6 mL    OUT:    Fat Emulsion (Fish Oil &amp; Plant Based) 20% Infusion: 0 mL    Indwelling Catheter - Urethral (mL): 50 mL  Total OUT: 50 mL    Total NET: 62.6 mL          General: NAD, resting comfortably in bed  C/V: NSR  Pulm: Nonlabored breathing, no respiratory distress  Abd: soft, nontender, mildly distended. Vac in place w/ good suction, SS output.  Extrem: WWP, no edema, SCDs in place        LABS:                        8.0    23.31 )-----------( 229      ( 03 Jul 2023 05:56 )             25.1     07-03    149<H>  |  112<H>  |  42<H>  ----------------------------<  136<H>  4.1   |  30  |  1.42<H>    Ca    6.8<L>      03 Jul 2023 05:56  Phos  3.6     07-03  Mg     2.7     07-03    TPro  5.3<L>  /  Alb  1.9<L>  /  TBili  3.0<H>  /  DBili  2.2<H>  /  AST  184<H>  /  ALT  71<H>  /  AlkPhos  72  07-03      Urinalysis Basic - ( 03 Jul 2023 05:56 )    Color: x / Appearance: x / SG: x / pH: x  Gluc: 136 mg/dL / Ketone: x  / Bili: x / Urobili: x   Blood: x / Protein: x / Nitrite: x   Leuk Esterase: x / RBC: x / WBC x   Sq Epi: x / Non Sq Epi: x / Bacteria: x        RADIOLOGY & ADDITIONAL STUDIES:

## 2023-07-03 NOTE — PROGRESS NOTE ADULT - SUBJECTIVE AND OBJECTIVE BOX
Pt seen and examined   no new complaints  CT noted    REVIEW OF SYSTEMS:  Constitutional: No fever  Cardiovascular: No chest pain, palpitations  Gastrointestinal: No abdominal or epigastric pain. No nausea, vomiting No BMs, tolerating ice chips  Skin: No itching, burning, rashes or lesions       MEDICATIONS:  MEDICATIONS  (STANDING):  albuterol/ipratropium for Nebulization 3 milliLiter(s) Nebulizer every 6 hours  aMIOdarone Infusion 0.501 mG/Min (16.7 mL/Hr) IV Continuous <Continuous>  cefTRIAXone   IVPB 2000 milliGRAM(s) IV Intermittent every 24 hours  chlorhexidine 2% Cloths 1 Application(s) Topical daily  chlorhexidine 2% Cloths 1 Application(s) Topical <User Schedule>  DAPTOmycin IVPB 800 milliGRAM(s) IV Intermittent every 24 hours  dexMEDEtomidine Infusion 0.2 MICROgram(s)/kG/Hr (5.05 mL/Hr) IV Continuous <Continuous>  dextrose 5%. 1000 milliLiter(s) (50 mL/Hr) IV Continuous <Continuous>  dextrose 50% Injectable 25 Gram(s) IV Push once  glucagon  Injectable 1 milliGRAM(s) IntraMuscular once  heparin   Injectable 5000 Unit(s) SubCutaneous every 8 hours  insulin lispro (ADMELOG) corrective regimen sliding scale   SubCutaneous every 6 hours  lipid, fat emulsion (Fish Oil and Plant Based) 20% Infusion 0.4951 Gm/kG/Day (20.8 mL/Hr) IV Continuous <Continuous>  metroNIDAZOLE  IVPB 500 milliGRAM(s) IV Intermittent every 8 hours  pantoprazole  Injectable 40 milliGRAM(s) IV Push daily  Parenteral Nutrition - Adult 1 Each (83 mL/Hr) TPN Continuous <Continuous>    MEDICATIONS  (PRN):  dextrose Oral Gel 15 Gram(s) Oral once PRN Blood Glucose LESS THAN 70 milliGRAM(s)/deciliter  hydrALAZINE Injectable 20 milliGRAM(s) IV Push every 6 hours PRN SBP> 160  HYDROmorphone  Injectable 0.2 milliGRAM(s) IV Push every 4 hours PRN Moderate Pain (4 - 6)  HYDROmorphone  Injectable 0.5 milliGRAM(s) IV Push every 4 hours PRN Severe Pain (7 - 10)  sodium chloride 0.9% lock flush 10 milliLiter(s) IV Push every 1 hour PRN Pre/post blood products, medications, blood draw, and to maintain line patency      Allergies    penicillin (Angioedema)    Intolerances        Vital Signs Last 24 Hrs  T(C): 37.1 (03 Jul 2023 09:00), Max: 37.8 (02 Jul 2023 17:55)  T(F): 98.8 (03 Jul 2023 09:00), Max: 100.1 (02 Jul 2023 17:55)  HR: 71 (03 Jul 2023 09:00) (68 - 96)  BP: 123/59 (03 Jul 2023 09:00) (117/57 - 195/84)  BP(mean): 85 (03 Jul 2023 09:00) (80 - 135)  RR: 32 (03 Jul 2023 09:00) (14 - 49)  SpO2: 89% (03 Jul 2023 09:00) (82% - 97%)    Parameters below as of 03 Jul 2023 08:00  Patient On (Oxygen Delivery Method): nasal cannula w/ humidification  O2 Flow (L/min): 6      07-02 @ 07:01  -  07-03 @ 07:00  --------------------------------------------------------  IN: 4440.2 mL / OUT: 4708 mL / NET: -267.8 mL    07-03 @ 07:01  -  07-03 @ 09:43  --------------------------------------------------------  IN: 112.6 mL / OUT: 50 mL / NET: 62.6 mL        PHYSICAL EXAM:    General:  in no acute distress  HEENT: MMM, conjunctiva and sclera clear  Gastrointestinal: Soft non-tender sl-distended; + bowel sounds;   Skin: Warm and dry. No obvious rash    LABS:      CBC Full  -  ( 03 Jul 2023 05:56 )  WBC Count : 23.31 K/uL  RBC Count : 2.61 M/uL  Hemoglobin : 8.0 g/dL  Hematocrit : 25.1 %  Platelet Count - Automated : 229 K/uL  Mean Cell Volume : 96.2 fl  Mean Cell Hemoglobin : 30.7 pg  Mean Cell Hemoglobin Concentration : 31.9 gm/dL  Auto Neutrophil # : x  Auto Lymphocyte # : x  Auto Monocyte # : x  Auto Eosinophil # : x  Auto Basophil # : x  Auto Neutrophil % : x  Auto Lymphocyte % : x  Auto Monocyte % : x  Auto Eosinophil % : x  Auto Basophil % : x    07-03    149<H>  |  112<H>  |  42<H>  ----------------------------<  136<H>  4.1   |  30  |  1.42<H>    Ca    6.8<L>      03 Jul 2023 05:56  Phos  3.6     07-03  Mg     2.7     07-03    TPro  5.3<L>  /  Alb  1.9<L>  /  TBili  3.0<H>  /  DBili  2.2<H>  /  AST  184<H>  /  ALT  71<H>  /  AlkPhos  72  07-03          Urinalysis Basic - ( 03 Jul 2023 05:56 )    Color: x / Appearance: x / SG: x / pH: x  Gluc: 136 mg/dL / Ketone: x  / Bili: x / Urobili: x   Blood: x / Protein: x / Nitrite: x   Leuk Esterase: x / RBC: x / WBC x   Sq Epi: x / Non Sq Epi: x / Bacteria: x                RADIOLOGY & ADDITIONAL STUDIES (The following images were personally reviewed):

## 2023-07-03 NOTE — PROGRESS NOTE ADULT - ASSESSMENT
78M h/o Crohn's disease, ileocecectomy, open appy, AFib/Aflutter s/p multiple DCCV p/w acute onset of abdominal pain on 6/23, found to have SBO.  S/p ex-lap, open TRE, resection of small bowell on 6/26. Course c/b pneumothorax s/p R pigtail placement on 6/27, RTOR on 6/28 s/p ileocolic resection with anastomosis, small bowel anastomosis and abdominal wall reconstruction and washout. 6/28 body fluid grow E. casseliflavus, which is intrinsically resistant to vanco. Patient with worsening leukocytosis, CTAP showed loosely loculated fluid collection within pelvis..     Recommendations:   - c/w daptomycin 800mg IV q24h  - c/w CTX 2g IV q24h  - c/w flagyl 500mg PO q8h   - f/u E. casseliflavus susceptibility for dapto  - agree w/ IR consult for drainage of pelvic fluid collection    Team 1 will continue to follow you.

## 2023-07-03 NOTE — PROGRESS NOTE ADULT - ASSESSMENT
78 year old male with a PMHx of Crohn's, HTN, HLD, Afib/flutter (on amiodarone), gout, ileocecectomy who was admitted with resolving SBO vs Crohn's flare s/p NGT decompression s/p laparoscopic lysis of adhesions converted to open with small bowel resection x3 and abthera placed. Thoracic surgery consulted on 6/27/23 for R PTX 2/2 R IJ central line. R pigtail placed at bedside with improved PTX. On 6/28/23 patient returned to OR with general surgery for abdominal closure, small bowel anastomosis and mikel-ileocolic resection. Pigtail placed to waterseal and removed on 7/3/23    Plan:  Problem 1: R Pneumothorax.    - PTX 2/2 R IJ central line s/p R pigtail placed on 6/27/23.  - Pigtail placed to waterseal and removed today 7/3/23  - Post pull CXR stable no ptx   - Thoracic surgery will sign off, please reconsult as needed

## 2023-07-03 NOTE — CONSULT NOTE ADULT - SUBJECTIVE AND OBJECTIVE BOX
Clinical History: SBO (SMALL BOWEL OBSTRUCTION)    Handoff    MEWS Score    Essential hypertension    Atrial fibrillation    Crohn's disease    Hyperlipidemia    Hypothyroidism    ELVI (acute kidney injury)    History of depression    Small bowel obstruction    Small bowel obstruction    Exploratory laparotomy    SBO (small bowel obstruction)    Laparoscopic lysis of intestinal adhesions    Exploratory laparotomy    Open lysis of intestinal adhesions    Resection of small bowel    Temporary closure of abdominal cavity    Repair, anastomosis, ileocolic    Small bowel resection with anastomosis    Flap, myocutaneous, abdominal wall    Reconstruction of abdominal wall using mesh    Washout of abdominal cavity    Other specified personal history presenting hazards to health    H/O knee surgery    History of cataract surgery    ABDO PAIN    90+    Laparoscopic lysis of intestinal adhesions    Open lysis of intestinal adhesions    Resection of small bowel    Temporary closure of abdominal cavity    [email or web address]        Meds:albuterol/ipratropium for Nebulization 3 milliLiter(s) Nebulizer every 6 hours  aMIOdarone Infusion 0.501 mG/Min IV Continuous <Continuous>  cefTRIAXone   IVPB 2000 milliGRAM(s) IV Intermittent every 24 hours  chlorhexidine 2% Cloths 1 Application(s) Topical daily  chlorhexidine 2% Cloths 1 Application(s) Topical <User Schedule>  DAPTOmycin IVPB 800 milliGRAM(s) IV Intermittent every 24 hours  dexMEDEtomidine Infusion 0.2 MICROgram(s)/kG/Hr IV Continuous <Continuous>  dextrose 5%. 1000 milliLiter(s) IV Continuous <Continuous>  dextrose 5%. 1000 milliLiter(s) IV Continuous <Continuous>  dextrose 50% Injectable 25 Gram(s) IV Push once  dextrose Oral Gel 15 Gram(s) Oral once PRN  glucagon  Injectable 1 milliGRAM(s) IntraMuscular once  hydrALAZINE Injectable 20 milliGRAM(s) IV Push every 6 hours PRN  HYDROmorphone  Injectable 0.2 milliGRAM(s) IV Push every 4 hours PRN  HYDROmorphone  Injectable 0.5 milliGRAM(s) IV Push every 4 hours PRN  insulin lispro (ADMELOG) corrective regimen sliding scale   SubCutaneous every 6 hours  lipid, fat emulsion (Fish Oil and Plant Based) 20% Infusion 0.4951 Gm/kG/Day IV Continuous <Continuous>  metroNIDAZOLE  IVPB 500 milliGRAM(s) IV Intermittent every 8 hours  pantoprazole  Injectable 40 milliGRAM(s) IV Push daily  Parenteral Nutrition - Adult 1 Each TPN Continuous <Continuous>  Parenteral Nutrition - Adult 1 Each TPN Continuous <Continuous>  sodium chloride 0.9% lock flush 10 milliLiter(s) IV Push every 1 hour PRN      Allergies:penicillin (Angioedema)        Labs:                           8.0    23.31 )-----------( 229      ( 03 Jul 2023 05:56 )             25.1       07-03    149<H>  |  112<H>  |  42<H>  ----------------------------<  136<H>  4.1   |  30  |  1.42<H>    Ca    6.8<L>      03 Jul 2023 05:56  Phos  3.6     07-03  Mg     2.7     07-03    TPro  5.3<L>  /  Alb  1.9<L>  /  TBili  3.0<H>  /  DBili  2.2<H>  /  AST  184<H>  /  ALT  71<H>  /  AlkPhos  72  07-03          Imaging Findings:    ACC: [id number] EXAM: CT ABDOMEN AND PELVIS OC IC ORDERED BY: CARLOS CRAIG    PROCEDURE DATE: 07/02/2023    IMPRESSION:    Findings of small bowel malrotation.    Persistent moderate dilatation and thickening of loops of jejunum proximal to enteric anastomosis within the anterior left upper quadrant, consistent with early/partial bowel obstruction versus postoperative ileus as contrast does not pass distal to this transition; follow-up CT is recommended.    Small loosely loculated fluid collections within the right upper quadrant and pelvis.    Small bilateral pleural effusions with bibasilar atelectasis.    Assessment:  78M w/ Crohn's, AFib/Flutter s/p DCCVs, remote ileocectomy and open appy here for resolving SBO vs Crohns flare, s/p NGT decompression. Now s/p laparoscopic lysis of adhesions, converted to open lysis of adhesions, SBR x 3 and left in discontinuity, abthera vac placement 2/27. 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 IR consulted for RLQ fluid collection/aspiration. Case reviewed with Dr. Rollins. Plan for procedure today.      Recommendations: NPO at midnight prior to procedure with sedation/anesthesia. D/C blood thinners. Please ensure Covid swab is up to date (within last 72 hours).        Communicated with:  Dr. Kadie RUIZ

## 2023-07-04 NOTE — PROGRESS NOTE ADULT - SUBJECTIVE AND OBJECTIVE BOX
Interval Events: Overnight patients mccauley was removed and he is voiding well. Chest tube was also removed yesterday. Increased free water and TPN to 3L. IR aspirated perihepatic collection due to fevers and increasing WBC count which was filled with serous fluid. Patient doing well since progression to CLD. SBP 200s gave hydralazine 20 mg which improved SBP to 170s and he was given a dose of labetalol 10 mg improving his bp to 140s.     Subjective:   Patient seen and examined at bedside. He is resting comfortably, alert and oriented x3 no longer confused. In no pain. Denies any N/V. Did have multiple bowel movements and flatulance.      Allergies    penicillin (Angioedema)    Intolerances        Vital Signs Last 24 Hrs  T(C): 37.2 (04 Jul 2023 09:00), Max: 37.3 (03 Jul 2023 21:55)  T(F): 98.9 (04 Jul 2023 09:00), Max: 99.2 (03 Jul 2023 21:55)  HR: 68 (04 Jul 2023 10:00) (64 - 87)  BP: 141/113 (04 Jul 2023 10:00) (117/56 - 201/79)  BP(mean): 123 (04 Jul 2023 10:00) (80 - 123)  RR: 29 (04 Jul 2023 10:00) (22 - 225)  SpO2: 96% (04 Jul 2023 10:00) (90% - 97%)    Parameters below as of 04 Jul 2023 10:00  Patient On (Oxygen Delivery Method): nasal cannula w/ humidification  O2 Flow (L/min): 6      07-03 @ 07:01 - 07-04 @ 07:00  --------------------------------------------------------  IN: 5645 mL / OUT: 2105 mL / NET: 3540 mL    07-04 @ 07:01 - 07-04 @ 11:25  --------------------------------------------------------  IN: 806.7 mL / OUT: 640 mL / NET: 166.7 mL      07-03 @ 07:01  - 07-04 @ 07:00  --------------------------------------------------------  IN: 5645 mL / OUT: 2105 mL / NET: 3540 mL    07-04 @ 07:01 - 07-04 @ 11:25  --------------------------------------------------------  IN: 806.7 mL / OUT: 640 mL / NET: 166.7 mL        Physical Exam:     Gen: NAD   Neuro: A&OX3   CV: RRR Reg s1s2 noM  Pulm: CTA b/l No w/r/r  Abd: Soft NT ND, abthera in place   Ext: No C/C   Vasc: + DP b/l   Skin: no rashes noted  MSK: b/l 2+ lower extremity edema       LABS:      CBC Full  -  ( 04 Jul 2023 05:14 )  WBC Count : 22.38 K/uL  RBC Count : 2.80 M/uL  Hemoglobin : 8.3 g/dL  Hematocrit : 26.8 %  Platelet Count - Automated : 251 K/uL  Mean Cell Volume : 95.7 fl  Mean Cell Hemoglobin : 29.6 pg  Mean Cell Hemoglobin Concentration : 31.0 gm/dL  Auto Neutrophil # : 20.81 K/uL  Auto Lymphocyte # : 0.98 K/uL  Auto Monocyte # : 0.20 K/uL  Auto Eosinophil # : 0.00 K/uL  Auto Basophil # : 0.18 K/uL  Auto Neutrophil % : 93.0 %  Auto Lymphocyte % : 4.4 %  Auto Monocyte % : 0.9 %  Auto Eosinophil % : 0.0 %  Auto Basophil % : 0.8 %    07-04    140  |  106  |  36<H>  ----------------------------<  147<H>  4.1   |  25  |  1.13    Ca    6.6<L>      04 Jul 2023 05:14  Phos  3.2     07-04  Mg     2.5     07-04    TPro  5.4<L>  /  Alb  1.9<L>  /  TBili  2.8<H>  /  DBili  2.0<H>  /  AST  213<H>  /  ALT  98<H>  /  AlkPhos  80  07-04          Urinalysis Basic - ( 04 Jul 2023 05:14 )    Color: x / Appearance: x / SG: x / pH: x  Gluc: 147 mg/dL / Ketone: x  / Bili: x / Urobili: x   Blood: x / Protein: x / Nitrite: x   Leuk Esterase: x / RBC: x / WBC x   Sq Epi: x / Non Sq Epi: x / Bacteria: x              RADIOLOGY & ADDITIONAL STUDIES (The following images were personally reviewed):   Interval Events: Overnight patients mccauley was removed and he is voiding well. Chest tube was also removed yesterday. Increased free water and TPN to 3L. IR aspirated perihepatic collection due to fevers and increasing WBC count which was filled with serous fluid. Patient doing well since progression to CLD. SBP 200s gave hydralazine 20 mg which improved SBP to 170s and he was given a dose of labetalol 10 mg improving his bp to 140s.     Subjective:   Patient seen and examined at bedside. He is resting comfortably, alert and oriented x3 no longer confused. In no pain. Denies any N/V. Did have multiple bowel movements and flatulance.    Vital Signs Last 24 Hrs  T(C): 37.2 (04 Jul 2023 09:00), Max: 37.3 (03 Jul 2023 21:55)  T(F): 98.9 (04 Jul 2023 09:00), Max: 99.2 (03 Jul 2023 21:55)  HR: 68 (04 Jul 2023 10:00) (64 - 87)  BP: 141/113 (04 Jul 2023 10:00) (117/56 - 201/79)  BP(mean): 123 (04 Jul 2023 10:00) (80 - 123)  RR: 29 (04 Jul 2023 10:00) (22 - 225)  SpO2: 96% (04 Jul 2023 10:00) (90% - 97%)    Parameters below as of 04 Jul 2023 10:00  Patient On (Oxygen Delivery Method): nasal cannula w/ humidification  O2 Flow (L/min): 6    07-03 @ 07:01 - 07-04 @ 07:00  --------------------------------------------------------  IN: 5645 mL / OUT: 2105 mL / NET: 3540 mL    07-04 @ 07:01 - 07-04 @ 11:25  --------------------------------------------------------  IN: 806.7 mL / OUT: 640 mL / NET: 166.7 mL    07-03 @ 07:01 - 07-04 @ 07:00  --------------------------------------------------------  IN: 5645 mL / OUT: 2105 mL / NET: 3540 mL    07-04 @ 07:01 - 07-04 @ 11:25  --------------------------------------------------------  IN: 806.7 mL / OUT: 640 mL / NET: 166.7 mL    Physical Exam:     Gen: NAD   Neuro: A&OX3   CV: RRR Reg s1s2 noM  Pulm: CTA b/l No w/r/r  Abd: Soft NT ND, abthera in place   Ext: No C/C   Vasc: + DP b/l   Skin: no rashes noted  MSK: b/l 2+ lower extremity edema     LABS:    CBC Full  -  ( 04 Jul 2023 05:14 )  WBC Count : 22.38 K/uL  RBC Count : 2.80 M/uL  Hemoglobin : 8.3 g/dL  Hematocrit : 26.8 %  Platelet Count - Automated : 251 K/uL  Mean Cell Volume : 95.7 fl  Mean Cell Hemoglobin : 29.6 pg  Mean Cell Hemoglobin Concentration : 31.0 gm/dL  Auto Neutrophil # : 20.81 K/uL  Auto Lymphocyte # : 0.98 K/uL  Auto Monocyte # : 0.20 K/uL  Auto Eosinophil # : 0.00 K/uL  Auto Basophil # : 0.18 K/uL  Auto Neutrophil % : 93.0 %  Auto Lymphocyte % : 4.4 %  Auto Monocyte % : 0.9 %  Auto Eosinophil % : 0.0 %  Auto Basophil % : 0.8 %    07-04    140  |  106  |  36<H>  ----------------------------<  147<H>  4.1   |  25  |  1.13    Ca    6.6<L>      04 Jul 2023 05:14  Phos  3.2     07-04  Mg     2.5     07-04    TPro  5.4<L>  /  Alb  1.9<L>  /  TBili  2.8<H>  /  DBili  2.0<H>  /  AST  213<H>  /  ALT  98<H>  /  AlkPhos  80  07-04    Urinalysis Basic - ( 04 Jul 2023 05:14 )    Color: x / Appearance: x / SG: x / pH: x  Gluc: 147 mg/dL / Ketone: x  / Bili: x / Urobili: x   Blood: x / Protein: x / Nitrite: x   Leuk Esterase: x / RBC: x / WBC x   Sq Epi: x / Non Sq Epi: x / Bacteria: x    RADIOLOGY & ADDITIONAL STUDIES (The following images were personally reviewed):

## 2023-07-04 NOTE — PROGRESS NOTE ADULT - SUBJECTIVE AND OBJECTIVE BOX
Awake and alert VS stable Afeb In RR Good BS ant Trace LE edema Hct stable WBCs slightly decreased Creatinine down Lytes ok

## 2023-07-04 NOTE — PROGRESS NOTE ADULT - ASSESSMENT
8M h/o Crohn's disease, ileocecectomy, open appy, AFib/Aflutter s/p multiple DCCV p/w acute onset of abdominal pain on 6/23, found to have SBO.  S/p ex-lap, open TRE, resection of small bowell on 6/26. Course c/b pneumothorax s/p R pigtail placement on 6/27, RTOR on 6/28 s/p ileocolic resection with anastomosis, small bowel anastomosis and abdominal wall reconstruction and washout. 6/28 body fluid grow E. casseliflavus, which is intrinsically resistant to vanco. Patient with worsening leukocytosis, CTAP showed loosely loculated fluid collection within pelvis, s/p IR drainage on 7/3.  So far fluid culture ngtd and patient has persistent leukocytosis.    - cont daptomycin 800mg IV q24h  - cont CTX 2g IV q24h  - cont flagyl 500mg PO q8h   - f/u abdominal fluid culture      Team 1 will follow you.  Case d/w primary team.    Angie Frye MD, MS  Infectious Disease attending  work cell [phone]   For any questions during evening/weekend/holiday, please page ID on call

## 2023-07-04 NOTE — PROGRESS NOTE ADULT - ATTENDING COMMENTS
Crohn's, AF, SBR s/p abdominal closure, abthera vac, AF  physical as above  encephalopathy cleared  diuresis  continue ceftriaxone/flagyl/ daptomycin  amiodarone and metoprolol  continue TPN and CLD  LFTs are stable  high complexity decision making

## 2023-07-04 NOTE — PROGRESS NOTE ADULT - SUBJECTIVE AND OBJECTIVE BOX
Pt seen and examined   + gas + flatus  out of bed to chair  having clears  chest tube out    REVIEW OF SYSTEMS:  Constitutional: No fever,   Cardiovascular: No chest pain, palpitations,   Gastrointestinal: No abdominal or epigastric pain. No nausea, vomiting   Skin: No itching, burning, rashes or lesions       MEDICATIONS:  MEDICATIONS  (STANDING):  allopurinol 300 milliGRAM(s) Oral daily  aMIOdarone    Tablet 200 milliGRAM(s) Oral daily  cefTRIAXone   IVPB 2000 milliGRAM(s) IV Intermittent every 24 hours  chlorhexidine 2% Cloths 1 Application(s) Topical <User Schedule>  chlorhexidine 2% Cloths 1 Application(s) Topical daily  DAPTOmycin IVPB 800 milliGRAM(s) IV Intermittent every 24 hours  dextrose 5%. 1000 milliLiter(s) (50 mL/Hr) IV Continuous <Continuous>  dextrose 50% Injectable 25 Gram(s) IV Push once  glucagon  Injectable 1 milliGRAM(s) IntraMuscular once  insulin lispro (ADMELOG) corrective regimen sliding scale   SubCutaneous every 6 hours  levothyroxine 50 MICROGram(s) Oral daily  lipid, fat emulsion (Fish Oil and Plant Based) 20% Infusion 0.4951 Gm/kG/Day (20.84 mL/Hr) IV Continuous <Continuous>  metoprolol tartrate 50 milliGRAM(s) Oral two times a day  metroNIDAZOLE  IVPB 500 milliGRAM(s) IV Intermittent every 8 hours  pantoprazole  Injectable 40 milliGRAM(s) IV Push daily  Parenteral Nutrition - Adult 1 Each (125 mL/Hr) TPN Continuous <Continuous>  Parenteral Nutrition - Adult 1 Each (83 mL/Hr) TPN Continuous <Continuous>  venlafaxine 150 milliGRAM(s) Oral every 12 hours    MEDICATIONS  (PRN):  dextrose Oral Gel 15 Gram(s) Oral once PRN Blood Glucose LESS THAN 70 milliGRAM(s)/deciliter  hydrALAZINE Injectable 20 milliGRAM(s) IV Push every 6 hours PRN SBP> 160  melatonin 10 milliGRAM(s) Oral at bedtime PRN Insomnia  oxyCODONE    IR 5 milliGRAM(s) Oral every 4 hours PRN Severe Pain (7 - 10)  sodium chloride 0.9% lock flush 10 milliLiter(s) IV Push every 1 hour PRN Pre/post blood products, medications, blood draw, and to maintain line patency      Allergies    penicillin (Angioedema)    Intolerances        Vital Signs Last 24 Hrs  T(C): 37.2 (04 Jul 2023 09:00), Max: 37.3 (03 Jul 2023 21:55)  T(F): 98.9 (04 Jul 2023 09:00), Max: 99.2 (03 Jul 2023 21:55)  HR: 59 (04 Jul 2023 12:00) (59 - 82)  BP: 140/60 (04 Jul 2023 12:00) (117/56 - 201/79)  BP(mean): 87 (04 Jul 2023 12:00) (80 - 123)  RR: 25 (04 Jul 2023 12:00) (22 - 225)  SpO2: 96% (04 Jul 2023 12:00) (94% - 98%)    Parameters below as of 04 Jul 2023 12:00  Patient On (Oxygen Delivery Method): nasal cannula, high flow  O2 Flow (L/min): 6      07-03 @ 07:01 - 07-04 @ 07:00  --------------------------------------------------------  IN: 5645 mL / OUT: 2105 mL / NET: 3540 mL    07-04 @ 07:01  -  07-04 @ 12:43  --------------------------------------------------------  IN: 931.7 mL / OUT: 640 mL / NET: 291.7 mL        PHYSICAL EXAM:    General:  in no acute distress  HEENT: MMM, conjunctiva and sclera clear  Gastrointestinal: Soft non-tender non-distended; + bowel sounds; wound vac  Skin: Warm and dry. No obvious rash    LABS:      CBC Full  -  ( 04 Jul 2023 05:14 )  WBC Count : 22.38 K/uL  RBC Count : 2.80 M/uL  Hemoglobin : 8.3 g/dL  Hematocrit : 26.8 %  Platelet Count - Automated : 251 K/uL  Mean Cell Volume : 95.7 fl  Mean Cell Hemoglobin : 29.6 pg  Mean Cell Hemoglobin Concentration : 31.0 gm/dL  Auto Neutrophil # : 20.81 K/uL  Auto Lymphocyte # : 0.98 K/uL  Auto Monocyte # : 0.20 K/uL  Auto Eosinophil # : 0.00 K/uL  Auto Basophil # : 0.18 K/uL  Auto Neutrophil % : 93.0 %  Auto Lymphocyte % : 4.4 %  Auto Monocyte % : 0.9 %  Auto Eosinophil % : 0.0 %  Auto Basophil % : 0.8 %    07-04    140  |  106  |  36<H>  ----------------------------<  147<H>  4.1   |  25  |  1.13    Ca    6.6<L>      04 Jul 2023 05:14  Phos  3.2     07-04  Mg     2.5     07-04    TPro  5.4<L>  /  Alb  1.9<L>  /  TBili  2.8<H>  /  DBili  2.0<H>  /  AST  213<H>  /  ALT  98<H>  /  AlkPhos  80  07-04          Urinalysis Basic - ( 04 Jul 2023 05:14 )    Color: x / Appearance: x / SG: x / pH: x  Gluc: 147 mg/dL / Ketone: x  / Bili: x / Urobili: x   Blood: x / Protein: x / Nitrite: x   Leuk Esterase: x / RBC: x / WBC x   Sq Epi: x / Non Sq Epi: x / Bacteria: x                RADIOLOGY & ADDITIONAL STUDIES (The following images were personally reviewed):

## 2023-07-04 NOTE — PROGRESS NOTE ADULT - SUBJECTIVE AND OBJECTIVE BOX
INFECTIOUS DISEASES CONSULT FOLLOW-UP NOTE    INTERVAL HPI/OVERNIGHT EVENTS:  No event overnight  patient feeling better, denied n/v/d  reports metalic taste from flagyl     ROS:   Constitutional, eyes, ENT, cardiovascular, respiratory, gastrointestinal, genitourinary, integumentary, neurological, psychiatric and heme/lymph are otherwise negative other than noted above       ANTIBIOTICS/RELEVANT:    MEDICATIONS  (STANDING):  allopurinol 300 milliGRAM(s) Oral daily  aMIOdarone    Tablet 200 milliGRAM(s) Oral daily  cefTRIAXone   IVPB 2000 milliGRAM(s) IV Intermittent every 24 hours  chlorhexidine 2% Cloths 1 Application(s) Topical daily  chlorhexidine 2% Cloths 1 Application(s) Topical <User Schedule>  DAPTOmycin IVPB 800 milliGRAM(s) IV Intermittent every 24 hours  dextrose 5%. 1000 milliLiter(s) (50 mL/Hr) IV Continuous <Continuous>  dextrose 50% Injectable 25 Gram(s) IV Push once  glucagon  Injectable 1 milliGRAM(s) IntraMuscular once  insulin lispro (ADMELOG) corrective regimen sliding scale   SubCutaneous every 6 hours  levothyroxine 50 MICROGram(s) Oral daily  lipid, fat emulsion (Fish Oil and Plant Based) 20% Infusion 0.4951 Gm/kG/Day (20.84 mL/Hr) IV Continuous <Continuous>  metoprolol tartrate 50 milliGRAM(s) Oral two times a day  metroNIDAZOLE  IVPB 500 milliGRAM(s) IV Intermittent every 8 hours  pantoprazole  Injectable 40 milliGRAM(s) IV Push daily  Parenteral Nutrition - Adult 1 Each (125 mL/Hr) TPN Continuous <Continuous>  Parenteral Nutrition - Adult 1 Each (83 mL/Hr) TPN Continuous <Continuous>  venlafaxine 150 milliGRAM(s) Oral every 12 hours    MEDICATIONS  (PRN):  dextrose Oral Gel 15 Gram(s) Oral once PRN Blood Glucose LESS THAN 70 milliGRAM(s)/deciliter  hydrALAZINE Injectable 20 milliGRAM(s) IV Push every 6 hours PRN SBP> 160  melatonin 10 milliGRAM(s) Oral at bedtime PRN Insomnia  oxyCODONE    IR 5 milliGRAM(s) Oral every 4 hours PRN Severe Pain (7 - 10)  sodium chloride 0.9% lock flush 10 milliLiter(s) IV Push every 1 hour PRN Pre/post blood products, medications, blood draw, and to maintain line patency        Vital Signs Last 24 Hrs  T(C): 37.2 (04 Jul 2023 09:00), Max: 37.3 (03 Jul 2023 21:55)  T(F): 98.9 (04 Jul 2023 09:00), Max: 99.2 (03 Jul 2023 21:55)  HR: 58 (04 Jul 2023 13:00) (58 - 82)  BP: 153/65 (04 Jul 2023 13:00) (117/56 - 201/79)  BP(mean): 93 (04 Jul 2023 13:00) (80 - 123)  RR: 29 (04 Jul 2023 13:00) (22 - 225)  SpO2: 96% (04 Jul 2023 13:00) (94% - 98%)    Parameters below as of 04 Jul 2023 13:00  Patient On (Oxygen Delivery Method): nasal cannula w/ humidification  O2 Flow (L/min): 6      07-03-23 @ 07:01  -  07-04-23 @ 07:00  --------------------------------------------------------  IN: 5645 mL / OUT: 2105 mL / NET: 3540 mL    07-04-23 @ 07:01  -  07-04-23 @ 13:47  --------------------------------------------------------  IN: 1056.7 mL / OUT: 890 mL / NET: 166.7 mL      PHYSICAL EXAM:  Constitutional: alert, NAD  Eyes: the sclera and conjunctiva were normal.   ENT: the ears and nose were normal in appearance.   Neck: the appearance of the neck was normal and the neck was supple.   Pulmonary: no respiratory distress and lungs were clear to auscultation bilaterally.   Heart: heart rate was normal and rhythm regular, normal S1 and S2  Vascular:. there was no peripheral edema  Abdomen: normal bowel sounds, soft, non-tender, wound vac         LABS:                        8.3    22.38 )-----------( 251      ( 04 Jul 2023 05:14 )             26.8     07-04    140  |  106  |  36<H>  ----------------------------<  147<H>  4.1   |  25  |  1.13    Ca    6.6<L>      04 Jul 2023 05:14  Phos  3.2     07-04  Mg     2.5     07-04    TPro  5.4<L>  /  Alb  1.9<L>  /  TBili  2.8<H>  /  DBili  2.0<H>  /  AST  213<H>  /  ALT  98<H>  /  AlkPhos  80  07-04      Urinalysis Basic - ( 04 Jul 2023 05:14 )    Color: x / Appearance: x / SG: x / pH: x  Gluc: 147 mg/dL / Ketone: x  / Bili: x / Urobili: x   Blood: x / Protein: x / Nitrite: x   Leuk Esterase: x / RBC: x / WBC x   Sq Epi: x / Non Sq Epi: x / Bacteria: x        MICROBIOLOGY:      RADIOLOGY & ADDITIONAL STUDIES:  Reviewed

## 2023-07-04 NOTE — PROGRESS NOTE ADULT - SUBJECTIVE AND OBJECTIVE BOX
SUBJECTIVE:  Pt seen at bedside this AM. Pt was found lying in bed comfortably. Chest tube removed yesterday. -N/-V, multiple BM yesterday + Flatus    MEDICATIONS  (STANDING):  allopurinol 300 milliGRAM(s) Oral daily  aMIOdarone    Tablet 200 milliGRAM(s) Oral daily  cefTRIAXone   IVPB 2000 milliGRAM(s) IV Intermittent every 24 hours  chlorhexidine 2% Cloths 1 Application(s) Topical <User Schedule>  chlorhexidine 2% Cloths 1 Application(s) Topical daily  DAPTOmycin IVPB 800 milliGRAM(s) IV Intermittent every 24 hours  dextrose 5%. 1000 milliLiter(s) (50 mL/Hr) IV Continuous <Continuous>  dextrose 50% Injectable 25 Gram(s) IV Push once  glucagon  Injectable 1 milliGRAM(s) IntraMuscular once  insulin lispro (ADMELOG) corrective regimen sliding scale   SubCutaneous every 6 hours  levothyroxine 50 MICROGram(s) Oral daily  lipid, fat emulsion (Fish Oil and Plant Based) 20% Infusion 0.4951 Gm/kG/Day (20.8 mL/Hr) IV Continuous <Continuous>  lipid, fat emulsion (Fish Oil and Plant Based) 20% Infusion 0.4951 Gm/kG/Day (20.8 mL/Hr) IV Continuous <Continuous>  metoprolol tartrate 50 milliGRAM(s) Oral two times a day  metroNIDAZOLE  IVPB 500 milliGRAM(s) IV Intermittent every 8 hours  pantoprazole  Injectable 40 milliGRAM(s) IV Push daily  Parenteral Nutrition - Adult 1 Each (83 mL/Hr) TPN Continuous <Continuous>  Parenteral Nutrition - Adult 1 Each (125 mL/Hr) TPN Continuous <Continuous>  venlafaxine 150 milliGRAM(s) Oral every 12 hours    MEDICATIONS  (PRN):  acetaminophen     Tablet .. 975 milliGRAM(s) Oral every 8 hours PRN Mild Pain (1 - 3)  dextrose Oral Gel 15 Gram(s) Oral once PRN Blood Glucose LESS THAN 70 milliGRAM(s)/deciliter  hydrALAZINE Injectable 20 milliGRAM(s) IV Push every 6 hours PRN SBP> 160  melatonin 10 milliGRAM(s) Oral at bedtime PRN Insomnia  oxyCODONE    IR 5 milliGRAM(s) Oral every 4 hours PRN Severe Pain (7 - 10)  sodium chloride 0.9% lock flush 10 milliLiter(s) IV Push every 1 hour PRN Pre/post blood products, medications, blood draw, and to maintain line patency      Vital Signs Last 24 Hrs  T(C): 36.9 (04 Jul 2023 05:08), Max: 37.3 (03 Jul 2023 21:55)  T(F): 98.4 (04 Jul 2023 05:08), Max: 99.2 (03 Jul 2023 21:55)  HR: 70 (04 Jul 2023 08:00) (64 - 87)  BP: 149/65 (04 Jul 2023 08:00) (117/56 - 201/79)  BP(mean): 94 (04 Jul 2023 08:00) (80 - 113)  RR: 27 (04 Jul 2023 08:00) (22 - 225)  SpO2: 94% (04 Jul 2023 08:00) (90% - 97%)    Parameters below as of 04 Jul 2023 08:00  Patient On (Oxygen Delivery Method): nasal cannula w/ humidification  O2 Flow (L/min): 6      Physical Exam:  General: NAD, resting comfortably in bed  Pulmonary: Nonlabored breathing, no respiratory distress  Cardiovascular: NSR  Abdominal: soft, NT/ND  Extremities: WWP, normal strength  Neuro: A/O x 3, CNs II-XII grossly intact, no focal deficits    I&O's Summary    03 Jul 2023 07:01  -  04 Jul 2023 07:00  --------------------------------------------------------  IN: 5645 mL / OUT: 2105 mL / NET: 3540 mL    04 Jul 2023 07:01  -  04 Jul 2023 08:35  --------------------------------------------------------  IN: 191.7 mL / OUT: 50 mL / NET: 141.7 mL        LABS:                        8.3    22.38 )-----------( 251      ( 04 Jul 2023 05:14 )             26.8     07-04    140  |  106  |  36<H>  ----------------------------<  147<H>  4.1   |  25  |  1.13    Ca    6.6<L>      04 Jul 2023 05:14  Phos  3.2     07-04  Mg     2.5     07-04    TPro  5.4<L>  /  Alb  1.9<L>  /  TBili  2.8<H>  /  DBili  2.0<H>  /  AST  213<H>  /  ALT  98<H>  /  AlkPhos  80  07-04      Urinalysis Basic - ( 04 Jul 2023 05:14 )    Color: x / Appearance: x / SG: x / pH: x  Gluc: 147 mg/dL / Ketone: x  / Bili: x / Urobili: x   Blood: x / Protein: x / Nitrite: x   Leuk Esterase: x / RBC: x / WBC x   Sq Epi: x / Non Sq Epi: x / Bacteria: x      CAPILLARY BLOOD GLUCOSE      POCT Blood Glucose.: 150 mg/dL (04 Jul 2023 06:22)  POCT Blood Glucose.: 129 mg/dL (03 Jul 2023 23:43)  POCT Blood Glucose.: 107 mg/dL (03 Jul 2023 17:47)  POCT Blood Glucose.: 128 mg/dL (03 Jul 2023 12:58)    LIVER FUNCTIONS - ( 04 Jul 2023 05:14 )  Alb: 1.9 g/dL / Pro: 5.4 g/dL / ALK PHOS: 80 U/L / ALT: 98 U/L / AST: 213 U/L / GGT: x             RADIOLOGY & ADDITIONAL STUDIES:

## 2023-07-04 NOTE — PROGRESS NOTE ADULT - ASSESSMENT
78M w/ Crohn's, AFib/Flutter s/p DCCVs, remote ileocectomy and open appy here for resolving SBO vs Crohns flare, s/p NGT decompression. Now s/p laparoscopic lysis of adhesions, converted to open lysis of adhesions, SBR x 3 and left in discontinuity, abthera vac placement 6/27. 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 Extubated 6/29, now with slightly improving leukocytosis and transaminitis.      Neuro: no confusion, or agitation off precedex. Hx of anxiety resume home venlafaxine   Pain: D/c dilaudid PRN start oxy 5 mg and Tylenol PRN   CV: MAP goal >65, Hx afib/flutter restart home PO amio and metoprolol 50 mg d/c amio gtt, hx HLD resume home Rosuvastatin; TTE (4/22) PASP 50 mmHg, EF 69%, monitor HTN hydralazine 20 PRN SBP >160  Pulm: Weaned to 6L NC, continue to wean, d/c duonebs, continue chest PT    GI: Advance from CLD to full liquid diet with TPN, hx of crohn's s/p ileocectomy, open appy now open abdomen with abthera, after TRE, SBR x3. RTOR 6/28 for non-ileocolic resection and small bowel anastamosis, bridging strattice mesh, abdominal binder at all times. Follow up with GI regarding transaminitis.  : voiding well, ELVI-resolved  ID: Perforated viscus, OR Cx: Vanc-resistant/amp-sens (but allergic) enterococcus: CTX (6/26-), Flagyl (6/26-), D/c Dapto (6/30-7/4). Leukocytosis: IR aspirate perihepatic collection + drain  Heme: OR EBL 1.5L, 3 units pRBC.  Endo: ISS; Hx of hypothyroid: cont synthroid. hx GOUT restart home allopurinol  PPx: SCD, SQH.   Lines: PIV x 2, [place] (6/26-30), RUE PICC (6/30--), R TLC (06/26-30).  Wounds: wound vac changed today (7/4)  PT/OT: Ordered  Dispo: SICU   78M w/ Crohn's, AFib/Flutter s/p DCCVs, remote ileocectomy and open appy here for resolving SBO vs Crohns flare, s/p NGT decompression. Now s/p laparoscopic lysis of adhesions, converted to open lysis of adhesions, SBR x 3 and left in discontinuity, abthera vac placement 6/27. 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 Extubated 6/29, now with slightly improving leukocytosis and transaminitis.    Neuro: no confusion, or agitation off precedex. Hx of anxiety resume home venlafaxine.  Pain: D/c dilaudid PRN, start oxy 5 mg and Tylenol PRN   CV: MAP goal >65, Hx afib/flutter restart home PO amio and metoprolol 50 mg. d/c amio gtt, hx HLD: resume home Rosuvastatin; TTE (4/22) PASP 50 mmHg, EF 69%, monitor HTN hydralazine 20 PRN SBP >160  Pulm: Weaned to 6L NC, continue to wean, d/c duonebs, continue chest PT    GI: Advance from CLD to full liquid diet with TPN, hx of crohn's s/p ileocectomy, open appy now open abdomen with abthera, after TRE, SBR x3. RTOR 6/28 for non-ileocolic resection and small bowel anastamosis, bridging strattice mesh, abdominal binder at all times. Follow up with GI regarding transaminitis.  : voiding well, ELVI-resolved  ID: Perforated viscus, OR Cx: Vanc-resistant/amp-sens (but allergic) enterococcus: CTX (6/26-), Flagyl (6/26-), Dapto (6/30--). Leukocytosis: IR aspirate perihepatic collection, pending IR cultures, continue ABX for now.   Heme: OR EBL 1.5L, 3 units pRBC.  Endo: ISS; Hx of hypothyroid: cont synthroid. hx GOUT restart home allopurinol.  PPx: SCD, SQH.   Lines: PIV x 2, [place] (6/26-30), RUE PICC (6/30--), R TLC (06/26-30).  Wounds: wound vac changed today (7/4)  PT/OT: Ordered  Dispo: SICU

## 2023-07-04 NOTE — PROGRESS NOTE ADULT - ASSESSMENT
78M S/P laparoscopic lysis of adhesions, converted to open lysis of adhesions, SBR x 3, temporary abdominal closure, POD5 ex lap, removal of abthera, ileocolic resection, small bowel anastomosis. Doing better, more alert and oriented. Abdominal collection drained by IR yesterday; gram stain negative.     Plan:  - Can start sips of clears and ice chips   - Remove [place]   - Follow ID recommendations   - Continue care in SICU  78M S/P laparoscopic lysis of adhesions, converted to open lysis of adhesions, SBR x 3, temporary abdominal closure, POD5 ex lap, removal of abthera, ileocolic resection, small bowel anastomosis. Doing better, more alert and oriented. Abdominal collection drained by IR yesterday; gram stain negative.     Plan:  - full liquid diet   - f/u GI recs for transaminitis   - transition to PO meds   - Continue care in SICU

## 2023-07-05 NOTE — BRIEF OPERATIVE NOTE - NSICDXBRIEFPROCEDURE_GEN_ALL_CORE_FT
PROCEDURES:  Laparoscopic lysis of intestinal adhesions 26-Jun-2023 20:21:32  Scotty Bridges  Exploratory laparotomy 26-Jun-2023 20:22:37  Scotty Bridges  Open lysis of intestinal adhesions 26-Jun-2023 20:22:56  Scotty Bridges  Resection of small bowel 26-Jun-2023 20:23:05  Scotty Bridges  Temporary closure of abdominal cavity 26-Jun-2023 20:23:17  Scotty Bridges  Repair, anastomosis, ileocolic 28-Jun-2023 12:22:33 repair ileocolic resection with anastomosis Reyna Watson  Small bowel resection with anastomosis 28-Jun-2023 12:23:34  Reyna Watson  Flap, myocutaneous, abdominal wall 28-Jun-2023 12:24:01  Renya Watson  Reconstruction of abdominal wall using mesh 28-Jun-2023 12:25:06 abdominal wall reconstruction with stratis *** Reyna Watson  Washout of abdominal cavity 28-Jun-2023 12:25:56  Reyna Watson  
PROCEDURES:  Laparoscopic lysis of intestinal adhesions 26-Jun-2023 20:21:32  Scotty Bridges  Exploratory laparotomy 26-Jun-2023 20:22:37  Scotty Bridges  Open lysis of intestinal adhesions 26-Jun-2023 20:22:56  Scotty Bridges  Resection of small bowel 26-Jun-2023 20:23:05  Scotty Bridges  Temporary closure of abdominal cavity 26-Jun-2023 20:23:17  Scotty Bridges  
PROCEDURES:  Exploratory laparotomy 26-Jun-2023 20:22:37  Scotty Bridges  Open lysis of intestinal adhesions 26-Jun-2023 20:22:56  Scotty Bridges  Resection of small bowel 26-Jun-2023 20:23:05  Scotty Bridges  Flap, myocutaneous, abdominal wall 28-Jun-2023 12:24:01  Renya Watson  Reconstruction of abdominal wall using mesh 28-Jun-2023 12:25:06 abdominal wall reconstruction with vicryl mesh Reyna Watson  Washout of abdominal cavity 28-Jun-2023 12:25:56  Reyna Watson  Creation of ileostomy 05-Jul-2023 18:39:54 end ileostomy Pawan Perez  Creation of colostomy 05-Jul-2023 18:40:09  Pawan Perez

## 2023-07-05 NOTE — PROGRESS NOTE ADULT - ASSESSMENT
78M w/ Crohn's, AFib/Flutter s/p DCCVs on amiodarone, remote ileocectomy and open appy here for SBO vs Crohns flare, s/p NGT decompression and now s/p lap TRE converted to open TRE, SBR x 3, left in discontinuity with abthera vac on 6/27, RTOR for ileocolic resection, small bowel anastomosis, and abdominal wall closure on 6/28, and s/p IR aspiration of perihepatic fluid on 7/3, stepped down to telemetery on 7/4.     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 CTX (6/26-), Flagyl (6/26-), Dapto (6/30-)  Wound vac - next change 7/5  Home amiodarone, metoprolol, statin, allopurinol, synthroid, Effexor  Duonebs prn q6hrs  SCDs/OOBA/IS  dispo: PT recs pending

## 2023-07-05 NOTE — PROGRESS NOTE ADULT - ATTENDING COMMENTS
Patient seen and examined. Wound VAC was removed. There is unhealthy tissue and clear succus. Patient with fistula vs anastomotic breakdown vs other etiology. Requires urgent EUA, abdominal wound washout. Potential stoma.

## 2023-07-05 NOTE — BRIEF OPERATIVE NOTE - ASSISTANT(S)
Yuliana PGY4, Brad PG1
Rajan PEREIRA, Reyna Watson PA-C , [facility] - med student
Chris, PGY-4

## 2023-07-05 NOTE — PROGRESS NOTE ADULT - SUBJECTIVE AND OBJECTIVE BOX
S: No new issues/events overnight, no new med c/o    O: ICU Vital Signs Last 24 Hrs  T(F): 97.7 (07-05-23 @ 12:26), Max: 99 (07-04-23 @ 17:55)  HR: 58 (07-05-23 @ 12:26) (57 - 64)  BP: 139/64 (07-05-23 @ 12:26) (114/54 - 177/67)  BP(mean): 92 (07-05-23 @ 12:17) (78 - 111)  ABP: --  RR: 18 (07-05-23 @ 12:26) (17 - 33)  SpO2: 94% (07-05-23 @ 12:26) (94% - 98%)    PHYSICAL EXAM:   Neurological: AAOx3, CNII-XII intact,  strength 5/5 b/l  ENT: mucus membrane moist  Cardiovascular: RRR  Respiratory: CTA  Gastrointestinal: soft, NT, ND, BS+  Extremities: warm, no dependent edema  Vascular: no cyanosis/erythema  Skin: no rashes  MSK: no joint swelling.     LABS:    07-05    137  |  104  |  28<H>  ----------------------------<  136<H>  4.1   |  25  |  1.05    Ca    6.6<L>      05 Jul 2023 05:30  Phos  4.0     07-05  Mg     2.4     07-05    TPro  5.4<L>  /  Alb  2.1<L>  /  TBili  2.5<H>  /  DBili  1.7<H>  /  AST  154<H>  /  ALT  90<H>  /  AlkPhos  85  07-05  LIVER FUNCTIONS - ( 05 Jul 2023 05:30 )  Alb: 2.1 g/dL / Pro: 5.4 g/dL / ALK PHOS: 85 U/L / ALT: 90 U/L / AST: 154 U/L / GGT: x                               8.3    22.05 )-----------( 235      ( 05 Jul 2023 05:30 )             27.5   PT/INR - ( 05 Jul 2023 05:30 )   PT: 13.6 sec;   INR: 1.14          PTT - ( 05 Jul 2023 05:30 )  PTT:23.5 secCARDIAC MARKERS ( 04 Jul 2023 05:14 )  x     / x     / 249 U/L / x     / x        Urinalysis Basic - ( 05 Jul 2023 05:30 )    Color: x / Appearance: x / SG: x / pH: x  Gluc: 136 mg/dL / Ketone: x  / Bili: x / Urobili: x   Blood: x / Protein: x / Nitrite: x   Leuk Esterase: x / RBC: x / WBC x   Sq Epi: x / Non Sq Epi: x / Bacteria: x    CAPILLARY BLOOD GLUCOSE      POCT Blood Glucose.: 116 mg/dL (04 Jul 2023 16:54)    MEDICATIONS  (STANDING):  cefTRIAXone   IVPB 2000 milliGRAM(s) IV Intermittent every 24 hours  chlorhexidine 2% Cloths 1 Application(s) Topical daily  chlorhexidine 2% Cloths 1 Application(s) Topical <User Schedule>  DAPTOmycin IVPB 800 milliGRAM(s) IV Intermittent every 24 hours  heparin   Injectable 5000 Unit(s) SubCutaneous every 8 hours  levothyroxine Injectable 40 MICROGram(s) IV Push <User Schedule>  lipid, fat emulsion (Fish Oil and Plant Based) 20% Infusion 0.495 Gm/kG/Day (20.8 mL/Hr) IV Continuous <Continuous>  metroNIDAZOLE  IVPB 500 milliGRAM(s) IV Intermittent every 8 hours  pantoprazole  Injectable 40 milliGRAM(s) IV Push daily  Parenteral Nutrition - Adult 1 Each (83 mL/Hr) TPN Continuous <Continuous>  Parenteral Nutrition - Adult 1 Each (83 mL/Hr) TPN Continuous <Continuous>    MEDICATIONS  (PRN):  sodium chloride 0.9% lock flush 10 milliLiter(s) IV Push every 1 hour PRN Pre/post blood products, medications, blood draw, and to maintain line patency      Poole:	  [ ] None	[ ] Daily Poole Order Placed	   Indication:	  [ ] Strict I and O's    [ ] Obstruction     [ ] Incontinence + Stage 3 or 4 Decubitus  Central Line:  [ ] None	   [ ]  Medication / TPN Administration     [ ] No Peripheral IV        S: found to have abd wound dehisence during vac change.     O: ICU Vital Signs Last 24 Hrs  T(F): 97.7 (07-05-23 @ 12:26), Max: 99 (07-04-23 @ 17:55)  HR: 58 (07-05-23 @ 12:26) (57 - 64)  BP: 139/64 (07-05-23 @ 12:26) (114/54 - 177/67)  BP(mean): 92 (07-05-23 @ 12:17) (78 - 111)  ABP: --  RR: 18 (07-05-23 @ 12:26) (17 - 33)  SpO2: 94% (07-05-23 @ 12:26) (94% - 98%)    PHYSICAL EXAM:   Neurological: AAOx3, CNII-XII intact,  strength 5/5 b/l  ENT: mucus membrane moist  Cardiovascular: RRR  Respiratory: CTA  Gastrointestinal: soft, midline incision with wound dehisence and fecculent drainage.   Extremities: warm, no dependent edema  Vascular: no cyanosis/erythema  Skin: no rashes  MSK: no joint swelling.     LABS:    07-05    137  |  104  |  28<H>  ----------------------------<  136<H>  4.1   |  25  |  1.05    Ca    6.6<L>      05 Jul 2023 05:30  Phos  4.0     07-05  Mg     2.4     07-05    TPro  5.4<L>  /  Alb  2.1<L>  /  TBili  2.5<H>  /  DBili  1.7<H>  /  AST  154<H>  /  ALT  90<H>  /  AlkPhos  85  07-05  LIVER FUNCTIONS - ( 05 Jul 2023 05:30 )  Alb: 2.1 g/dL / Pro: 5.4 g/dL / ALK PHOS: 85 U/L / ALT: 90 U/L / AST: 154 U/L / GGT: x                               8.3    22.05 )-----------( 235      ( 05 Jul 2023 05:30 )             27.5   PT/INR - ( 05 Jul 2023 05:30 )   PT: 13.6 sec;   INR: 1.14          PTT - ( 05 Jul 2023 05:30 )  PTT:23.5 secCARDIAC MARKERS ( 04 Jul 2023 05:14 )  x     / x     / 249 U/L / x     / x        Urinalysis Basic - ( 05 Jul 2023 05:30 )    Color: x / Appearance: x / SG: x / pH: x  Gluc: 136 mg/dL / Ketone: x  / Bili: x / Urobili: x   Blood: x / Protein: x / Nitrite: x   Leuk Esterase: x / RBC: x / WBC x   Sq Epi: x / Non Sq Epi: x / Bacteria: x    CAPILLARY BLOOD GLUCOSE      POCT Blood Glucose.: 116 mg/dL (04 Jul 2023 16:54)    MEDICATIONS  (STANDING):  cefTRIAXone   IVPB 2000 milliGRAM(s) IV Intermittent every 24 hours  chlorhexidine 2% Cloths 1 Application(s) Topical daily  chlorhexidine 2% Cloths 1 Application(s) Topical <User Schedule>  DAPTOmycin IVPB 800 milliGRAM(s) IV Intermittent every 24 hours  heparin   Injectable 5000 Unit(s) SubCutaneous every 8 hours  levothyroxine Injectable 40 MICROGram(s) IV Push <User Schedule>  lipid, fat emulsion (Fish Oil and Plant Based) 20% Infusion 0.495 Gm/kG/Day (20.8 mL/Hr) IV Continuous <Continuous>  metroNIDAZOLE  IVPB 500 milliGRAM(s) IV Intermittent every 8 hours  pantoprazole  Injectable 40 milliGRAM(s) IV Push daily  Parenteral Nutrition - Adult 1 Each (83 mL/Hr) TPN Continuous <Continuous>  Parenteral Nutrition - Adult 1 Each (83 mL/Hr) TPN Continuous <Continuous>    MEDICATIONS  (PRN):  sodium chloride 0.9% lock flush 10 milliLiter(s) IV Push every 1 hour PRN Pre/post blood products, medications, blood draw, and to maintain line patency      Poole:	  [ x] None	[ ] Daily Poole Order Placed	   Indication:	  [ ] Strict I and O's    [ ] Obstruction     [ ] Incontinence + Stage 3 or 4 Decubitus  Central Line:  [ ] None	   [ x]  Medication / TPN Administration     [ ] No Peripheral IV

## 2023-07-05 NOTE — CHART NOTE - NSCHARTNOTEFT_GEN_A_CORE
Admitting Diagnosis:   Patient is a 78y old  Male who presents with a chief complaint of SBO (03 Jul 2023 15:32)      PAST MEDICAL & SURGICAL HISTORY:  Essential hypertension  Hypertension    Atrial fibrillation  s/p cardioversion 2010 and 2014  Pt. reports 4 DCCV  Now on Amiodarone 200mg PO bid and Eliquis 5mg PO bid  Last DCCV 4 yrs ago at [facility]    Crohn's disease  s/p partial resection of ileum    Hyperlipidemia    Hypothyroidism    History of depression  On Venlafaxine ER 150mg PO bid    H/O knee surgery    History of cataract surgery    Current Nutrition Order:  >>> Pt ordered for TPN via central line: 455gm Dex, 123gm AA, 50gm 20% SMOF Lipids to provide in total 2539kcal, 123gm pro,  GIR 3.13 mg/kg/min, 1.2gm/kg ABW protein.    PO Intake: Good (%) [   ]  Fair (50-75%) [   ] Poor (<25%) [   ]   >> Unable to assess at this time; pt noted to be tolerating CLD, advanced to FLD (7/4) and Low Fiber diet (7/5) with TPN. Currently placed NPO for procedure.    GI Issues: No report of N/V/D/C; noted with abd distention and abd discomfort per flowsheets (7/5), +BM 7/4.     Pain: N/A at this time    Skin Integrity: Surgical incisions (midline abd, RLQ puncture site). 3+ edema @ B/L hands, wrists, and arms. No pressure injuries documented at this time. Rudy 14.    Labs:   07-05    137  |  104  |  28<H>  ----------------------------<  136<H>  4.1   |  25  |  1.05    Ca    6.6<L>      05 Jul 2023 05:30  Phos  4.0     07-05  Mg     2.4     07-05    TPro  5.4<L>  /  Alb  2.1<L>  /  TBili  2.5<H>  /  DBili  1.7<H>  /  AST  154<H>  /  ALT  90<H>  /  AlkPhos  85  07-05    CAPILLARY BLOOD GLUCOSE    POCT Blood Glucose: 116 mg/dL (04 Jul 2023 16:54)    Medications:  MEDICATIONS  (STANDING):  lipid, fat emulsion (Fish Oil and Plant Based) 20% Infusion 0.495 Gm/kG/Day (20.8 mL/Hr) IV Continuous <Continuous>  Parenteral Nutrition - Adult 1 Each (83 mL/Hr) TPN Continuous <Continuous>  Parenteral Nutrition - Adult 1 Each (83 mL/Hr) TPN Continuous <Continuous>    MEDICATIONS  (PRN):    Anthropometrics:  Height: 6'2"   Weight: 101kg/222lb  BMI: 28.6 kg/m2    IBW: 86.4kg/190lb   % IBW: 117%    Weight Change: No new documented weights in EMR. Recommend obtaining updated weight weekly. RD to continue monitoring as able.     Nutrition Focused Physical Exam: Completed [   ]  Not Pertinent [   ] - - N/A at this time    Estimated energy needs: ActualBW (101kg) used to calculated estimated needs per Standards of Care given pt within % IBW%  (117%). Adjusted for age, increased needs 2/2 post-op, invasive surgery, open wounds.     Calories: 2525-2828kcal/day  (25-28kcal/kg)  Protein: 121-141gm/day  (1.2-1.4gm/kg)  Fluid: 3030-3535mL/day (30-35mL/kg)    Subjective:   78M w/ Crohn's, AFib/Flutter s/p DCCVs on amiodarone, remote ileocectomy and open appy here for SBO vs Crohn’s flare, s/p NGT decompression and now s/p lap TRE converted to open TRE, SBR x 3, left in discontinuity with abthera vac on 6/27, RTOR for ileocolic resection, small bowel anastomosis, and abdominal wall closure on 6/28, and s/p IR aspiration of perihepatic fluid on 7/3, stepped down to telemetery on 7/4. wound dehiscence on 7/5, RTOR 7/5 for exlap, washout.    RDN attempted to visit pt for follow up assessment 2x. Per chart, pt found to have abd wound dehiscence during vac change 7/5 with RTOR, thus information obtained via EMR at this time. Pt currently placed NPO for procedure. Per EMR, pt continues to be ordered for TPN. Prior to NPO status pt was noted to be tolerating CLD and advanced to FLD w/ Ensure (TID) 7/4 and to Low Fiber diet w/ Ensure (TID) 7/5. No report of N/V/D/C; noted with abd distention and abd discomfort per flowsheets (7/5), +BM 7/4. Per flowsheets, noted with 3+ edema @ B/L hands, wrists, and arms. No pressure injuries documented at this time. Rudy 14. Please see nutrition recommendations. Will continue to monitor per RD protocol; reconsult prn.     Previous Nutrition Diagnosis: Inadequate Oral Intake R/T GI surgery AEB NPO    Active [ x - ongoing, currently NPO for RTOR exlap 7/5]  Resolved [   ]    New PES: Increased Nutrient Needs R/T physiological demands for nutrient AEB post-op wound healing    Goal: Consistently meet >75% of estimated needs during admission via most appropriate route for nutrition     Recommendations:  1. Currently placed NPO; when medically feasible, reinitiate nutrition  >> As warranted, continue current TPN order via central line: 455gm Dex, 123gm AA, 50gm 20% SMOF Lipids to provide in total 2539kcal, 123gm pro,  GIR 3.13 mg/kg/min, 1.2gm/kg ABW protein  >> Monitor lytes and replete prn  >> Advance PO diet as medically feasible   2. Monitor chemistry, wt trends, GI function, and skin integrity   3. Align nutrition with GOC at all times  4. RD to remain available for additional nutrition interventions and diet edu as needed    Education: N/A at this time    Risk Level: High [ x ] Moderate [   ] Low [   ]

## 2023-07-05 NOTE — PROGRESS NOTE ADULT - ASSESSMENT
78M h/o Crohn's disease, ileocecectomy, open appy, AFib/Aflutter s/p multiple DCCV p/w acute onset of abdominal pain on 6/23, found to have SBO.  S/p ex-lap, open TRE, resection of small bowell on 6/26. Course c/b pneumothorax s/p R pigtail placement on 6/27, RTOR on 6/28 s/p ileocolic resection with anastomosis, small bowel anastomosis and abdominal wall reconstruction and washout. 6/28 body fluid grow E. casseliflavus, which is intrinsically resistant to vanco    Recommendations:   - c/w daptomycin 800mg IV q24h  - c/w CTX 2g IV q24h  - c/w flagyl 500mg PO q8h   - f/u E. casseliflavus susceptibility for dapto  - agree w/ IR consult for drainage of pelvic fluid collection    Team 1 will continue to follow you.  78M h/o Crohn's disease, ileocecectomy, open appy, AFib/Aflutter s/p multiple DCCV p/w acute onset of abdominal pain on 6/23, found to have SBO.  S/p ex-lap, open TRE, resection of small bowell on 6/26. Course c/b pneumothorax s/p R pigtail placement on 6/27, RTOR on 6/28 s/p ileocolic resection with anastomosis, small bowel anastomosis and abdominal wall reconstruction and washout. 6/28 body fluid grow E. casseliflavus. Patient was improving clinically until this morning patient found to have wound vac leaking and surgical site dehiscence, was taken back emergently to OR for exlap, found to have anastomotic leak - now s/p exlap with washout and ileostomy/colostomy creation.     07/03 abdominal fluid culture: C. Tertium and Lactobacillus rhamnosus       Recommendations:   - STOP Ctx, flagyl, daptomycin   - Start imipenem 1g q24   - f/u sensitivities 07/03   - f.u OR abdominal fluid and surgical swab culture from today       Team 1 will continue to follow you.

## 2023-07-05 NOTE — PROGRESS NOTE ADULT - SUBJECTIVE AND OBJECTIVE BOX
INFECTIOUS DISEASES CONSULT FOLLOW-UP NOTE    INTERVAL HPI/OVERNIGHT EVENTS:      ROS:   Constitutional, eyes, ENT, cardiovascular, respiratory, gastrointestinal, genitourinary, integumentary, neurological, psychiatric and heme/lymph are otherwise negative other than noted above       ANTIBIOTICS/RELEVANT:    MEDICATIONS  (STANDING):  allopurinol 300 milliGRAM(s) Oral daily  aMIOdarone    Tablet 200 milliGRAM(s) Oral daily  cefTRIAXone   IVPB 2000 milliGRAM(s) IV Intermittent every 24 hours  chlorhexidine 2% Cloths 1 Application(s) Topical daily  chlorhexidine 2% Cloths 1 Application(s) Topical <User Schedule>  DAPTOmycin IVPB 800 milliGRAM(s) IV Intermittent every 24 hours  levothyroxine 50 MICROGram(s) Oral daily  lipid, fat emulsion (Fish Oil and Plant Based) 20% Infusion 0.4951 Gm/kG/Day (20.84 mL/Hr) IV Continuous <Continuous>  metoprolol tartrate 50 milliGRAM(s) Oral two times a day  metroNIDAZOLE  IVPB 500 milliGRAM(s) IV Intermittent every 8 hours  pantoprazole    Tablet 40 milliGRAM(s) Oral daily  Parenteral Nutrition - Adult 1 Each (83 mL/Hr) TPN Continuous <Continuous>  venlafaxine 150 milliGRAM(s) Oral every 12 hours    MEDICATIONS  (PRN):  melatonin 10 milliGRAM(s) Oral at bedtime PRN Insomnia  oxyCODONE    IR 2.5 milliGRAM(s) Oral every 6 hours PRN Moderate Pain (4 - 6)  oxyCODONE    IR 5 milliGRAM(s) Oral every 4 hours PRN Severe Pain (7 - 10)  sodium chloride 0.9% lock flush 10 milliLiter(s) IV Push every 1 hour PRN Pre/post blood products, medications, blood draw, and to maintain line patency        Vital Signs Last 24 Hrs  T(C): 36.5 (05 Jul 2023 05:03), Max: 37.2 (04 Jul 2023 09:00)  T(F): 97.7 (05 Jul 2023 05:03), Max: 99 (04 Jul 2023 17:55)  HR: 60 (05 Jul 2023 04:08) (57 - 74)  BP: 154/79 (05 Jul 2023 04:08) (114/54 - 177/67)  BP(mean): 111 (05 Jul 2023 04:08) (78 - 123)  RR: 17 (05 Jul 2023 04:08) (17 - 35)  SpO2: 95% (05 Jul 2023 04:08) (94% - 98%)    Parameters below as of 05 Jul 2023 04:08  Patient On (Oxygen Delivery Method): nasal cannula        07-03-23 @ 07:01  -  07-04-23 @ 07:00  --------------------------------------------------------  IN: 5645 mL / OUT: 2105 mL / NET: 3540 mL    07-04-23 @ 07:01  -  07-05-23 @ 06:14  --------------------------------------------------------  IN: 4232.3 mL / OUT: 2440 mL / NET: 1792.3 mL      PHYSICAL EXAM:  Constitutional: alert, NAD  Eyes: the sclera and conjunctiva were normal.   ENT: the ears and nose were normal in appearance.   Neck: the appearance of the neck was normal and the neck was supple.   Pulmonary: no respiratory distress and lungs were clear to auscultation bilaterally.   Heart: heart rate was normal and rhythm regular, normal S1 and S2  Vascular:. there was no peripheral edema  Abdomen: normal bowel sounds, soft, non-tender  Neurological: no focal deficits.   Psychiatric: the affect was normal        LABS:                        8.3    22.38 )-----------( 251      ( 04 Jul 2023 05:14 )             26.8     07-04    139  |  105  |  34<H>  ----------------------------<  134<H>  4.0   |  25  |  1.17    Ca    6.6<L>      04 Jul 2023 22:37  Phos  3.2     07-04  Mg     2.5     07-04    TPro  5.4<L>  /  Alb  1.9<L>  /  TBili  2.8<H>  /  DBili  2.0<H>  /  AST  213<H>  /  ALT  98<H>  /  AlkPhos  80  07-04      Urinalysis Basic - ( 04 Jul 2023 22:37 )    Color: x / Appearance: x / SG: x / pH: x  Gluc: 134 mg/dL / Ketone: x  / Bili: x / Urobili: x   Blood: x / Protein: x / Nitrite: x   Leuk Esterase: x / RBC: x / WBC x   Sq Epi: x / Non Sq Epi: x / Bacteria: x        MICROBIOLOGY:      RADIOLOGY & ADDITIONAL STUDIES:  Reviewed INFECTIOUS DISEASES CONSULT FOLLOW-UP NOTE    INTERVAL HPI/OVERNIGHT EVENTS:  Wound vac leaking overnight, patient in bed in NAD. Denies fevers, chills, abdominal pain.     ROS:   Constitutional, eyes, ENT, cardiovascular, respiratory, gastrointestinal, genitourinary, integumentary, neurological, psychiatric and heme/lymph are otherwise negative other than noted above       ANTIBIOTICS/RELEVANT:    MEDICATIONS  (STANDING):  allopurinol 300 milliGRAM(s) Oral daily  aMIOdarone    Tablet 200 milliGRAM(s) Oral daily  cefTRIAXone   IVPB 2000 milliGRAM(s) IV Intermittent every 24 hours  chlorhexidine 2% Cloths 1 Application(s) Topical daily  chlorhexidine 2% Cloths 1 Application(s) Topical <User Schedule>  DAPTOmycin IVPB 800 milliGRAM(s) IV Intermittent every 24 hours  levothyroxine 50 MICROGram(s) Oral daily  lipid, fat emulsion (Fish Oil and Plant Based) 20% Infusion 0.4951 Gm/kG/Day (20.84 mL/Hr) IV Continuous <Continuous>  metoprolol tartrate 50 milliGRAM(s) Oral two times a day  metroNIDAZOLE  IVPB 500 milliGRAM(s) IV Intermittent every 8 hours  pantoprazole    Tablet 40 milliGRAM(s) Oral daily  Parenteral Nutrition - Adult 1 Each (83 mL/Hr) TPN Continuous <Continuous>  venlafaxine 150 milliGRAM(s) Oral every 12 hours    MEDICATIONS  (PRN):  melatonin 10 milliGRAM(s) Oral at bedtime PRN Insomnia  oxyCODONE    IR 2.5 milliGRAM(s) Oral every 6 hours PRN Moderate Pain (4 - 6)  oxyCODONE    IR 5 milliGRAM(s) Oral every 4 hours PRN Severe Pain (7 - 10)  sodium chloride 0.9% lock flush 10 milliLiter(s) IV Push every 1 hour PRN Pre/post blood products, medications, blood draw, and to maintain line patency        Vital Signs Last 24 Hrs  T(C): 36.5 (05 Jul 2023 05:03), Max: 37.2 (04 Jul 2023 09:00)  T(F): 97.7 (05 Jul 2023 05:03), Max: 99 (04 Jul 2023 17:55)  HR: 60 (05 Jul 2023 04:08) (57 - 74)  BP: 154/79 (05 Jul 2023 04:08) (114/54 - 177/67)  BP(mean): 111 (05 Jul 2023 04:08) (78 - 123)  RR: 17 (05 Jul 2023 04:08) (17 - 35)  SpO2: 95% (05 Jul 2023 04:08) (94% - 98%)    Parameters below as of 05 Jul 2023 04:08  Patient On (Oxygen Delivery Method): nasal cannula        07-03-23 @ 07:01  -  07-04-23 @ 07:00  --------------------------------------------------------  IN: 5645 mL / OUT: 2105 mL / NET: 3540 mL    07-04-23 @ 07:01  -  07-05-23 @ 06:14  --------------------------------------------------------  IN: 4232.3 mL / OUT: 2440 mL / NET: 1792.3 mL      PHYSICAL EXAM:  Constitutional: alert, NAD  Eyes: the sclera and conjunctiva were normal.   ENT: the ears and nose were normal in appearance.   Neck: the appearance of the neck was normal and the neck was supple.   Pulmonary: no respiratory distress and lungs were clear to auscultation bilaterally.   Heart: heart rate was normal and rhythm regular, normal S1 and S2  Vascular:. there was no peripheral edema  Abdomen: Woundvac midline abdomen, no obvious leak  Neurological: no focal deficits.   Psychiatric: the affect was normal        LABS:                        8.3    22.38 )-----------( 251      ( 04 Jul 2023 05:14 )             26.8     07-04    139  |  105  |  34<H>  ----------------------------<  134<H>  4.0   |  25  |  1.17    Ca    6.6<L>      04 Jul 2023 22:37  Phos  3.2     07-04  Mg     2.5     07-04    TPro  5.4<L>  /  Alb  1.9<L>  /  TBili  2.8<H>  /  DBili  2.0<H>  /  AST  213<H>  /  ALT  98<H>  /  AlkPhos  80  07-04      Urinalysis Basic - ( 04 Jul 2023 22:37 )    Color: x / Appearance: x / SG: x / pH: x  Gluc: 134 mg/dL / Ketone: x  / Bili: x / Urobili: x   Blood: x / Protein: x / Nitrite: x   Leuk Esterase: x / RBC: x / WBC x   Sq Epi: x / Non Sq Epi: x / Bacteria: x        MICROBIOLOGY:      RADIOLOGY & ADDITIONAL STUDIES:  Reviewed

## 2023-07-05 NOTE — PRE-ANESTHESIA EVALUATION ADULT - NSANTHPROCED_GEN_ALL_CORE
Arterial Catheter
Arterial Catheter
Arterial Catheter/Central Venous Catheter
Arterial Catheter/Central Venous Catheter

## 2023-07-05 NOTE — PROGRESS NOTE ADULT - ASSESSMENT
78M POD7 laparoscopic lysis of adhesions, converted to open lysis of adhesions, SBR x 3, temporary abdominal closure, POD6 ex lap, removal of abthera, ileocolic resection, small bowel anastomosis.    Plan:  - Vac change 3 times/week  - f/u ID recs re abx duration  - Care per primary team   - Surgery team 4c will continue to follow

## 2023-07-05 NOTE — BRIEF OPERATIVE NOTE - OPERATION/FINDINGS
Procedure: Laparoscopic lysis of adhesions, converted to open lysis of adhesions, SBR x 3, temporary abdominal closure    Description: Optiview entry at Vivar's point with 5 mm scope, 3 additional 5 mm ports placed. Difficult lysis of adhesions to the anterior abdominal wall performed with sharp dissection. Lower midline incision performed. Due to very dense lysis of interloop adhesions decision was made to convert to full midline incision. Lysis of adhesions x 4 hours, 3 enterotomies made which were resected with ISAMAR blue loads; left in discontinuity, with silk suture connecting ends. Multiple serosal tears repaired with 2-0 silk. Mesenteric bleeding near the LOT controlled with pressure and hemostatic agents. Hemostasis achieved. Abdomen left open with abthera device.
as dictated. ileocolic resection with anastomosis, small bowel anastomosis, myocutaneous flaps with abdominal wall reconstruction with stratis tissue matrix, and abdominal washout. Wound vac placed on suction to 125. Patient to be transferred back to ICU.
Midline fascial dehiscence appreciated at inferior aspect of laparotomy incision, feculent drainage noted around mesh. PDS suture anchoring strattice mesh in place removed, rest of midline fascial sutures removed. Abdominal cavity irrigated, small bowel densely adherent with interloop adhesions, gently broken apart with blunt dissection. Blowout of ileocolic anastomosis identified, second smaller enterotomy appreciated at most proximal aspect of SB/SB side to side anastomosis. Smaller enterotomy closed primarily w 2-0 vicryl suture in running fashion. Ileocolic anastomosis mobilized w combination of sharp dissection + Ligasure, anastomosis sharply divided, with proximal + distal margins taken with ISAMAR stapler Blue 75x2. Transverse colon mesentery mobilized, blow hole colostomy w long Franc's pouch created in LUQ. End ileostomy Brooked in standard fashion in R jae-abdomen, Red rubber catheter placed through ostomy, fed distally, placed just proximal to side to side small anastomosis, secured to ostomy w 3-0 vicryl. Previously created b/l myofascial flaps necrotic, sharply debrided. Vicryl mesh used to bridge fascia w combination of running + interrupted PDS suture. Xeroform x2 placed over mesh, wound packed with wet to dry Kerlix x3.

## 2023-07-05 NOTE — BRIEF OPERATIVE NOTE - SPECIMENS
SBR x 3
part of small bowel intestine (ileum) , ileocolic
portion ileum, portion Transverse colon, previous strattice mesh

## 2023-07-05 NOTE — BRIEF OPERATIVE NOTE - NSICDXBRIEFPREOP_GEN_ALL_CORE_FT
PRE-OP DIAGNOSIS:  Small bowel obstruction 26-Jun-2023 20:33:41  Scotty Bridges  
PRE-OP DIAGNOSIS:  Small bowel obstruction 26-Jun-2023 20:33:41  Scotty Bridges  
PRE-OP DIAGNOSIS:  Ileocolic anastomotic leak 05-Jul-2023 18:41:35  Pawan Perez  Small bowel anastomotic leak 05-Jul-2023 18:41:56  Pawan Perez

## 2023-07-05 NOTE — PROGRESS NOTE ADULT - ASSESSMENT
78M w/ Crohn's, AFib/Flutter s/p DCCVs on amiodarone, remote ileocectomy and open appy here for SBO vs Crohns flare, s/p NGT decompression and now s/p lap TRE converted to open TRE, SBR x 3, left in discontinuity with abthera vac on 6/27, RTOR for ileocolic resection, small bowel anastomosis, and abdominal wall closure on 6/28, and s/p IR aspiration of perihepatic fluid on 7/3, stepped down to telemetery on 7/4. wound dehisence on 7/5, RTOR 7/5 for exlap, washout...       Neuro:  zofran prn for nausea; Hx anxiety: holding venlafaxine while NPO.   CV: Hx of Afib/flutter: s/p DCCV, on PO amio (hold while NPO), cont lorpessor as IV if BP can tolerate; Hx of HLD: holding home rosuvastatin while NPO; TTE (04/22) - PASP 50mmHg, EF 69%.   Pulm: no resp distress on nasal canula. s/p Chest tube removed by CT Surgery 7/3.   GI: TPN/lipids, wound dehisense, switch from low residue diet back to NPO, RTOR for exlap today.   : Voids, ELVI resolved.   ID: Perforated viscus, OR Cx: Vanc-resistant/amp-sens (but allergic) enterococcus: CTX (6/26-), Flagyl (6/26-), Dapto (6/30--). Leukocytosis: IR aspirate perihepatic collection 7/3, pending IR culture result.  Endo: ISS; Hx of hypothyroid: cont synthroid IV. Gout: holding home allopurinol while NPO.   PPx: SCD, SQH.   Lines: PIV x 2, RUE PICC (6/30--) // DC: R Chest tube (6/27--7/3), R TLC (06/26-30).[place] (6/26-30),   Wounds: midline incision.   PT/OT: re-order post-op.   Dispo: return to SICU post-op   78M w/ Crohn's, AFib/Flutter s/p DCCVs on amiodarone, remote ileocectomy and open appy here for SBO vs Crohns flare, s/p NGT decompression and now s/p lap TRE converted to open TRE, SBR x 3, left in discontinuity with abthera vac on 6/27, RTOR for ileocolic resection, small bowel anastomosis, and abdominal wall closure on 6/28, and s/p IR aspiration of perihepatic fluid on 7/3, stepped down to telemetery on 7/4. wound dehisence on 7/5, RTOR 7/5 for exlap, washout       Neuro:  zofran prn for nausea; Hx anxiety: holding venlafaxine while NPO.   CV: Hx of Afib/flutter: s/p DCCV, on PO amio (hold while NPO), cont lorpessor as IV if BP can tolerate; Hx of HLD: holding home rosuvastatin while NPO; TTE (04/22) - PASP 50mmHg, EF 69%.   Pulm: no resp distress on nasal canula. s/p Chest tube removed by CT Surgery 7/3.   GI: TPN/lipids, wound dehisense, switch from low residue diet back to NPO, RTOR for exlap today.   : Voids, ELVI resolved.   ID: Perforated viscus, OR Cx: Vanc-resistant/amp-sens (but allergic) enterococcus: CTX (6/26-), Flagyl (6/26-), Dapto (6/30--). Leukocytosis: IR aspirate perihepatic collection 7/3, pending IR culture result.  Endo: ISS; Hx of hypothyroid: cont synthroid IV. Gout: holding home allopurinol while NPO.   PPx: SCD, SQH.   Lines: PIV x 2, RUE PICC (6/30--) // DC: R Chest tube (6/27--7/3), R TLC (06/26-30).[place] (6/26-30),   Wounds: midline incision.   PT/OT: re-order post-op if able to participate .   Dispo: return to SICU post-op

## 2023-07-05 NOTE — PROGRESS NOTE ADULT - SUBJECTIVE AND OBJECTIVE BOX
Pt seen and examined   tolerating diet  no diarrhea today    REVIEW OF SYSTEMS:  Constitutional: No fever  Cardiovascular: No chest pain, palpitations, r  Gastrointestinal: No abdominal or epigastric pain. No nausea, vomiting r  Skin: No itching, burning, rashes or lesions       MEDICATIONS:  MEDICATIONS  (STANDING):  allopurinol 300 milliGRAM(s) Oral daily  aMIOdarone    Tablet 200 milliGRAM(s) Oral daily  cefTRIAXone   IVPB 2000 milliGRAM(s) IV Intermittent every 24 hours  chlorhexidine 2% Cloths 1 Application(s) Topical <User Schedule>  chlorhexidine 2% Cloths 1 Application(s) Topical daily  DAPTOmycin IVPB 800 milliGRAM(s) IV Intermittent every 24 hours  heparin   Injectable 5000 Unit(s) SubCutaneous every 8 hours  levothyroxine 50 MICROGram(s) Oral daily  lipid, fat emulsion (Fish Oil and Plant Based) 20% Infusion 0.4951 Gm/kG/Day (20.84 mL/Hr) IV Continuous <Continuous>  metoprolol tartrate 50 milliGRAM(s) Oral two times a day  metroNIDAZOLE  IVPB 500 milliGRAM(s) IV Intermittent every 8 hours  pantoprazole    Tablet 40 milliGRAM(s) Oral daily  Parenteral Nutrition - Adult 1 Each (83 mL/Hr) TPN Continuous <Continuous>  venlafaxine 150 milliGRAM(s) Oral every 12 hours    MEDICATIONS  (PRN):  melatonin 10 milliGRAM(s) Oral at bedtime PRN Insomnia  oxyCODONE    IR 5 milliGRAM(s) Oral every 4 hours PRN Severe Pain (7 - 10)  oxyCODONE    IR 2.5 milliGRAM(s) Oral every 6 hours PRN Moderate Pain (4 - 6)  sodium chloride 0.9% lock flush 10 milliLiter(s) IV Push every 1 hour PRN Pre/post blood products, medications, blood draw, and to maintain line patency      Allergies    penicillin (Angioedema)    Intolerances        Vital Signs Last 24 Hrs  T(C): 36.5 (05 Jul 2023 05:03), Max: 37.2 (04 Jul 2023 17:55)  T(F): 97.7 (05 Jul 2023 05:03), Max: 99 (04 Jul 2023 17:55)  HR: 58 (05 Jul 2023 08:15) (57 - 68)  BP: 125/60 (05 Jul 2023 08:15) (114/54 - 177/67)  BP(mean): 86 (05 Jul 2023 08:15) (78 - 111)  RR: 17 (05 Jul 2023 08:15) (17 - 35)  SpO2: 95% (05 Jul 2023 08:15) (95% - 98%)    Parameters below as of 05 Jul 2023 08:15  Patient On (Oxygen Delivery Method): room air        07-04 @ 07:01  -  07-05 @ 07:00  --------------------------------------------------------  IN: 4539.9 mL / OUT: 2865 mL / NET: 1674.9 mL        PHYSICAL EXAM:    General:  in no acute distress  HEENT: MMM, conjunctiva and sclera clear  Gastrointestinal: Soft non-tender non-distended; Normal bowel sounds; wound vac  Skin: Warm and dry. No obvious rash    LABS:      CBC Full  -  ( 05 Jul 2023 05:30 )  WBC Count : 22.05 K/uL  RBC Count : 2.74 M/uL  Hemoglobin : 8.3 g/dL  Hematocrit : 27.5 %  Platelet Count - Automated : 235 K/uL  Mean Cell Volume : 100.4 fl  Mean Cell Hemoglobin : 30.3 pg  Mean Cell Hemoglobin Concentration : 30.2 gm/dL  Auto Neutrophil # : x  Auto Lymphocyte # : x  Auto Monocyte # : x  Auto Eosinophil # : x  Auto Basophil # : x  Auto Neutrophil % : x  Auto Lymphocyte % : x  Auto Monocyte % : x  Auto Eosinophil % : x  Auto Basophil % : x    07-05    137  |  104  |  28<H>  ----------------------------<  136<H>  4.1   |  25  |  1.05    Ca    6.6<L>      05 Jul 2023 05:30  Phos  4.0     07-05  Mg     2.4     07-05    TPro  5.4<L>  /  Alb  2.1<L>  /  TBili  2.5<H>  /  DBili  1.7<H>  /  AST  154<H>  /  ALT  90<H>  /  AlkPhos  85  07-05    PT/INR - ( 05 Jul 2023 05:30 )   PT: 13.6 sec;   INR: 1.14          PTT - ( 05 Jul 2023 05:30 )  PTT:23.5 sec      Urinalysis Basic - ( 05 Jul 2023 05:30 )    Color: x / Appearance: x / SG: x / pH: x  Gluc: 136 mg/dL / Ketone: x  / Bili: x / Urobili: x   Blood: x / Protein: x / Nitrite: x   Leuk Esterase: x / RBC: x / WBC x   Sq Epi: x / Non Sq Epi: x / Bacteria: x                RADIOLOGY & ADDITIONAL STUDIES (The following images were personally reviewed):

## 2023-07-05 NOTE — PROGRESS NOTE ADULT - SUBJECTIVE AND OBJECTIVE BOX
SUBJECTIVE: Patient seen and examined at bedside. Denies abdominal pain, able to rhoda PO without n/v. Denies cp, sob. Having consistent bowel function. Voiding without issues. Encouraged OOBA.      cefTRIAXone   IVPB 2000 milliGRAM(s) IV Intermittent every 24 hours  DAPTOmycin IVPB 800 milliGRAM(s) IV Intermittent every 24 hours  heparin   Injectable 5000 Unit(s) SubCutaneous every 8 hours  metroNIDAZOLE  IVPB 500 milliGRAM(s) IV Intermittent every 8 hours      Vital Signs Last 24 Hrs  T(C): 36.5 (05 Jul 2023 12:26), Max: 37.2 (04 Jul 2023 17:55)  T(F): 97.7 (05 Jul 2023 12:26), Max: 99 (04 Jul 2023 17:55)  HR: 58 (05 Jul 2023 12:26) (57 - 64)  BP: 139/64 (05 Jul 2023 12:26) (114/54 - 177/67)  BP(mean): 92 (05 Jul 2023 12:17) (78 - 111)  RR: 18 (05 Jul 2023 12:26) (17 - 33)  SpO2: 94% (05 Jul 2023 12:26) (94% - 98%)    Parameters below as of 05 Jul 2023 12:17  Patient On (Oxygen Delivery Method): room air      I&O's Detail    04 Jul 2023 07:01  -  05 Jul 2023 07:00  --------------------------------------------------------  IN:    Amiodarone: 16.7 mL    Fat Emulsion (Fish Oil &amp; Plant Based) 20% Infusion: 291.2 mL    IV PiggyBack: 100 mL    IV PiggyBack: 50 mL    IV PiggyBack: 300 mL    Oral Fluid: 1370 mL    TPN (Total Parenteral Nutrition): 2412 mL  Total IN: 4539.9 mL    OUT:    VAC (Vacuum Assisted Closure) System (mL): 325 mL    Voided (mL): 2540 mL  Total OUT: 2865 mL    Total NET: 1674.9 mL      05 Jul 2023 07:01  -  05 Jul 2023 12:45  --------------------------------------------------------  IN:    TPN (Total Parenteral Nutrition): 415 mL  Total IN: 415 mL    OUT:    Voided (mL): 150 mL  Total OUT: 150 mL    Total NET: 265 mL          General: NAD, resting comfortably in bed  C/V: NSR  Pulm: Nonlabored breathing, no respiratory distress  Abd: soft, nontender, nondistended. Vac in place w/ good suction, SS output.  Extrem: WWP, no edema, SCDs in place      LABS:                        8.3    22.05 )-----------( 235      ( 05 Jul 2023 05:30 )             27.5     07-05    137  |  104  |  28<H>  ----------------------------<  136<H>  4.1   |  25  |  1.05    Ca    6.6<L>      05 Jul 2023 05:30  Phos  4.0     07-05  Mg     2.4     07-05    TPro  5.4<L>  /  Alb  2.1<L>  /  TBili  2.5<H>  /  DBili  1.7<H>  /  AST  154<H>  /  ALT  90<H>  /  AlkPhos  85  07-05    PT/INR - ( 05 Jul 2023 05:30 )   PT: 13.6 sec;   INR: 1.14          PTT - ( 05 Jul 2023 05:30 )  PTT:23.5 sec  Urinalysis Basic - ( 05 Jul 2023 05:30 )    Color: x / Appearance: x / SG: x / pH: x  Gluc: 136 mg/dL / Ketone: x  / Bili: x / Urobili: x   Blood: x / Protein: x / Nitrite: x   Leuk Esterase: x / RBC: x / WBC x   Sq Epi: x / Non Sq Epi: x / Bacteria: x        RADIOLOGY & ADDITIONAL STUDIES:

## 2023-07-05 NOTE — PROGRESS NOTE ADULT - ASSESSMENT
if tolerating diet can d/c tpn  diuretics for edema  WBC 22K  antibiotics per ID  IR drain negative growth to date  LFTs improved  PT

## 2023-07-05 NOTE — PROGRESS NOTE ADULT - SUBJECTIVE AND OBJECTIVE BOX
INTERVAL HPI/OVERNIGHT EVENTS: K 4.0 @ 10pm, AMRITA, VSS     STATUS POST:    6/27: Laparoscopic lysis of adhesions, converted to open lysis of adhesions, SBR x 3, temporary abdominal closure, 5L cryst, 1L 5% alb, 3 pRBC, 1 FFP, 1 plts  6/28: ex lap, removal of abthera, ileocolic resection, small bowel anastamosis, , 1.6 crystalloid, 250 5%, 175 uop   7/3: IR Gendel drained perihepatic aspiration of serous fluid.     SUBJECTIVE: Pt seen and examined at bedside this am by surgery team. No acute complaints. Tolerating diet, pain well controlled. Denies f/n/v/cp/sob.    MEDICATIONS  (STANDING):  allopurinol 300 milliGRAM(s) Oral daily  aMIOdarone    Tablet 200 milliGRAM(s) Oral daily  cefTRIAXone   IVPB 2000 milliGRAM(s) IV Intermittent every 24 hours  chlorhexidine 2% Cloths 1 Application(s) Topical daily  chlorhexidine 2% Cloths 1 Application(s) Topical <User Schedule>  DAPTOmycin IVPB 800 milliGRAM(s) IV Intermittent every 24 hours  levothyroxine 50 MICROGram(s) Oral daily  lipid, fat emulsion (Fish Oil and Plant Based) 20% Infusion 0.4951 Gm/kG/Day (20.84 mL/Hr) IV Continuous <Continuous>  metoprolol tartrate 50 milliGRAM(s) Oral two times a day  metroNIDAZOLE  IVPB 500 milliGRAM(s) IV Intermittent every 8 hours  pantoprazole    Tablet 40 milliGRAM(s) Oral daily  Parenteral Nutrition - Adult 1 Each (83 mL/Hr) TPN Continuous <Continuous>  venlafaxine 150 milliGRAM(s) Oral every 12 hours    MEDICATIONS  (PRN):  melatonin 10 milliGRAM(s) Oral at bedtime PRN Insomnia  oxyCODONE    IR 5 milliGRAM(s) Oral every 4 hours PRN Severe Pain (7 - 10)  oxyCODONE    IR 2.5 milliGRAM(s) Oral every 6 hours PRN Moderate Pain (4 - 6)  sodium chloride 0.9% lock flush 10 milliLiter(s) IV Push every 1 hour PRN Pre/post blood products, medications, blood draw, and to maintain line patency    Vital Signs Last 24 Hrs  T(C): 36.5 (05 Jul 2023 05:03), Max: 37.2 (04 Jul 2023 09:00)  T(F): 97.7 (05 Jul 2023 05:03), Max: 99 (04 Jul 2023 17:55)  HR: 60 (05 Jul 2023 04:08) (57 - 74)  BP: 154/79 (05 Jul 2023 04:08) (114/54 - 177/67)  BP(mean): 111 (05 Jul 2023 04:08) (78 - 123)  RR: 17 (05 Jul 2023 04:08) (17 - 35)  SpO2: 95% (05 Jul 2023 04:08) (94% - 98%)    Parameters below as of 05 Jul 2023 04:08  Patient On (Oxygen Delivery Method): nasal cannula    PHYSICAL EXAM:    Constitutional: A&Ox3, NAD    Respiratory: non labored breathing, no respiratory distress    Cardiovascular: NSR, RRR    Gastrointestinal: abdomen soft, nd, appropriately ttp to surgical site. No rebound or guarding. Midline wound vac in place w/ adequate suction.     Extremities: wwp, no calf tenderness or edema. SCDs in place       I&O's Detail    04 Jul 2023 07:01  -  05 Jul 2023 07:00  --------------------------------------------------------  IN:    Amiodarone: 16.7 mL    Fat Emulsion (Fish Oil &amp; Plant Based) 20% Infusion: 291.2 mL    IV PiggyBack: 100 mL    IV PiggyBack: 50 mL    IV PiggyBack: 300 mL    Oral Fluid: 1370 mL    TPN (Total Parenteral Nutrition): 2412 mL  Total IN: 4539.9 mL    OUT:    VAC (Vacuum Assisted Closure) System (mL): 325 mL    Voided (mL): 2540 mL  Total OUT: 2865 mL    Total NET: 1674.9 mL          LABS:                        8.3    22.38 )-----------( 251      ( 04 Jul 2023 05:14 )             26.8     07-04    139  |  105  |  34<H>  ----------------------------<  134<H>  4.0   |  25  |  1.17    Ca    6.6<L>      04 Jul 2023 22:37  Phos  3.2     07-04  Mg     2.5     07-04    TPro  5.4<L>  /  Alb  1.9<L>  /  TBili  2.8<H>  /  DBili  2.0<H>  /  AST  213<H>  /  ALT  98<H>  /  AlkPhos  80  07-04      Urinalysis Basic - ( 04 Jul 2023 22:37 )    Color: x / Appearance: x / SG: x / pH: x  Gluc: 134 mg/dL / Ketone: x  / Bili: x / Urobili: x   Blood: x / Protein: x / Nitrite: x   Leuk Esterase: x / RBC: x / WBC x   Sq Epi: x / Non Sq Epi: x / Bacteria: x        RADIOLOGY & ADDITIONAL STUDIES:

## 2023-07-05 NOTE — CHART NOTE - NSCHARTNOTEFT_GEN_A_CORE
Patient was seen at bedside for wound vac change. On evaluation, wound had expanded to 31cm x 6cm. Loose suture material was noted in the wound as well as feculent appearing fluid. Dr. Peterson and Dr. Knapp were made aware. Patient was informed of possible dehiscence with plan to return to OR within the next hour. Patient has been added on to the schedule for ex-lap with possible ostomy, possible bowel resection, possible closure with mesh.     Vital Signs Last 24 Hrs  T(C): 36.5 (05 Jul 2023 09:00), Max: 37.2 (04 Jul 2023 17:55)  T(F): 97.7 (05 Jul 2023 09:00), Max: 99 (04 Jul 2023 17:55)  HR: 58 (05 Jul 2023 08:15) (57 - 64)  BP: 125/60 (05 Jul 2023 08:15) (114/54 - 177/67)  BP(mean): 86 (05 Jul 2023 08:15) (78 - 111)  ABP: --  ABP(mean): --  RR: 17 (05 Jul 2023 08:15) (17 - 33)  SpO2: 95% (05 Jul 2023 08:15) (95% - 98%)    O2 Parameters below as of 05 Jul 2023 08:15  Patient On (Oxygen Delivery Method): room air    Physical Exam  CONSTITUTIONAL: Well groomed, no apparent distress  RESP: No respiratory distress, no use of accessory muscles; CTA b/l, no WRR; slight SOB while sitting  CV: RRR, +S1S2, no MRG; no JVD; no peripheral edema  GI: Soft, NT, ND, no rebound; wound vac removed with noted expansion of wound with bloody and possible feculent fluid, internal loose stitch noted at midline incision  Extremities: WWP, no calf tenderness or edema  PSYCH: Appropriate insight/judgment; A+O x 3, mood and affect appropriate, recent/remote memory intact    Plan: Patient will be returning to OR today for ex-lap with possible ostomy, possible bowel resection, possible closure with mesh. Patient was seen at bedside for wound vac change. On evaluation, patient was asymptomatic and in good spirits. Denied nausea, vomiting, or pain on exam. Patient was noted to be short of breath while supine however did not report feeling out of breath.    On exam, wound had expanded to 31cm x 6cm. Loose suture material was noted in the wound as well as feculent appearing fluid. Dr. Peterson and Dr. Knapp were made aware. Patient was informed of possible dehiscence with plan to return to OR within the next hour. Patient has been added on to the schedule for ex-lap with possible ostomy, possible bowel resection, possible closure with mesh.     Vital Signs Last 24 Hrs  T(C): 36.5 (05 Jul 2023 09:00), Max: 37.2 (04 Jul 2023 17:55)  T(F): 97.7 (05 Jul 2023 09:00), Max: 99 (04 Jul 2023 17:55)  HR: 58 (05 Jul 2023 08:15) (57 - 64)  BP: 125/60 (05 Jul 2023 08:15) (114/54 - 177/67)  BP(mean): 86 (05 Jul 2023 08:15) (78 - 111)  ABP: --  ABP(mean): --  RR: 17 (05 Jul 2023 08:15) (17 - 33)  SpO2: 95% (05 Jul 2023 08:15) (95% - 98%)    O2 Parameters below as of 05 Jul 2023 08:15  Patient On (Oxygen Delivery Method): room air    Physical Exam  CONSTITUTIONAL: Well groomed, no apparent distress  RESP: No respiratory distress, no use of accessory muscles; CTA b/l, no WRR; slight SOB while sitting  CV: RRR, +S1S2, no MRG; no JVD; no peripheral edema  GI: Soft, NT, ND, no rebound; wound vac removed with noted expansion of wound with bloody and possible feculent fluid, internal loose stitch noted at midline incision  Extremities: WWP, no calf tenderness or edema  PSYCH: Appropriate insight/judgment; A+O x 3, mood and affect appropriate, recent/remote memory intact    Plan: Patient will be returning to OR today for ex-lap with possible ostomy, possible bowel resection, possible closure with mesh.

## 2023-07-05 NOTE — BRIEF OPERATIVE NOTE - NSICDXBRIEFPOSTOP_GEN_ALL_CORE_FT
POST-OP DIAGNOSIS:  Small bowel obstruction 26-Jun-2023 20:33:49  Scotty Bridges  
POST-OP DIAGNOSIS:  Small bowel obstruction 26-Jun-2023 20:33:49  Scotty Bridges  
POST-OP DIAGNOSIS:  Ileocolic anastomotic leak 05-Jul-2023 18:40:56  Pawan Perez  Small bowel anastomotic leak 05-Jul-2023 18:42:15  Pawan Perez

## 2023-07-05 NOTE — PROGRESS NOTE ADULT - ATTENDING COMMENTS
#anastomosis leak, abdominal wall dehiscence, intraabdominal abscess    Patient comfortably resting this morning however wound vac was leaking, and abdominal wall dehiscence noted.  Patient taken to the OR, found to have feculent discharge, anastomosis leak, enterotomy, underwent ex-lap, washout, TRE, ileostomy and colostomy creation.  7/3 abdominal fluid culture growing clostridium tetrium and lactobacillus rhamnosus.  Stop dapto/CTX/flagyl, start imipenem 1g IV q8h to better cover Enterococcus casseliflavis, lactobacillus and Clostridium + other GNR and anaerobes.  f/u OR culture.    Team 1 will follow you.  Dr. Duncan will take over the care tomorrow.  Case d/w primary team.    Angie Frye MD, MS  Infectious Disease attending  work cell [phone]   For any questions during evening/weekend/holiday, please page ID on call

## 2023-07-06 NOTE — PROGRESS NOTE ADULT - ASSESSMENT
78M POD8 laparoscopic lysis of adhesions, converted to open lysis of adhesions, SBR x 3, temporary abdominal closure, POD7 ex lap, removal of abthera, ileocolic resection, small bowel anastomosis, POD1 RTOR exlap, washout, ileocolic resection with end ileostomy, blow hole colostomy, fistula, red rubber from ileostomy to small bowel anastomosis; vicryl bridging mesh; R JOYCE below ileostomy, L JOYCE at small bowel enterotomy repair.    Plan:     - TID dressing change   - Monitor drain outputs   - Rest of care as per SICU team   - Team 1 will follow

## 2023-07-06 NOTE — PROGRESS NOTE ADULT - ASSESSMENT
78M POD8 laparoscopic lysis of adhesions, converted to open lysis of adhesions, SBR x 3, temporary abdominal closure, POD7 ex lap, removal of abthera, ileocolic resection, small bowel anastomosis, POD1 RTOR exlap, washout, ileocolic resection with end ileostomy, blow hole colostomy, fistula, red rubber from ileostomy to small bowel anastomosis; vicryl bridging mesh; R JOYCE below ileostomy, L JOYCE at small bowel enterotomy repair.    Plan:  - Continue dressing change BID  - Wean sedation to extubate  - Rest of care per primary  - Appreciate excellent SICU care 78M POD8 laparoscopic lysis of adhesions, converted to open lysis of adhesions, SBR x 3, temporary abdominal closure, POD7 ex lap, removal of abthera, ileocolic resection, small bowel anastomosis, POD1 RTOR exlap, washout, ileocolic resection with end ileostomy, blow hole colostomy, fistula, red rubber from ileostomy to small bowel anastomosis; vicryl bridging mesh; R JOYCE below ileostomy, L JOYCE at small bowel enterotomy repair.    Plan:  - Continue dressing change TID  - Wean sedation to extubate  - Rest of care per primary  - Appreciate excellent SICU care

## 2023-07-06 NOTE — AIRWAY REMOVAL NOTE  ADULT & PEDS - ARTIFICAL AIRWAY REMOVAL COMMENTS
extubated tolerated well. SICU at bedside. Pt tolerating aerosol mask. No signs of distress. Will continue to monitor.

## 2023-07-06 NOTE — PROGRESS NOTE ADULT - SUBJECTIVE AND OBJECTIVE BOX
ON:       SUBJECTIVE: Pt seen and examined at bedside this am by ICU team. Patient is lying comfortably in bed with no complaints. Tolerating diet, pain well controlled with current regimen. Patient denies fever, nausea, vomiting, chest pain, and shortness of breath. Patient is passing gas and having bowel movements.       MEDICATIONS  (STANDING):  chlorhexidine 0.12% Liquid 15 milliLiter(s) Oral Mucosa every 12 hours  chlorhexidine 2% Cloths 1 Application(s) Topical <User Schedule>  fentaNYL   Infusion. 0.5 MICROgram(s)/kG/Hr (5.05 mL/Hr) IV Continuous <Continuous>  imipenem/cilastatin  IVPB 1000 milliGRAM(s) IV Intermittent every 8 hours  insulin lispro (ADMELOG) corrective regimen sliding scale   SubCutaneous every 6 hours  levothyroxine Injectable 40 MICROGram(s) IV Push <User Schedule>  lipid, fat emulsion (Fish Oil and Plant Based) 20% Infusion 0.495 Gm/kG/Day (20.8 mL/Hr) IV Continuous <Continuous>  norepinephrine Infusion 0.05 MICROgram(s)/kG/Min (9.47 mL/Hr) IV Continuous <Continuous>  pantoprazole  Injectable 40 milliGRAM(s) IV Push daily  Parenteral Nutrition - Adult 1 Each (83 mL/Hr) TPN Continuous <Continuous>  propofol Infusion 10 MICROgram(s)/kG/Min (6.06 mL/Hr) IV Continuous <Continuous>    MEDICATIONS  (PRN):      Drips:     ICU Vital Signs Last 24 Hrs  T(C): 36.9 (06 Jul 2023 05:15), Max: 38.3 (06 Jul 2023 00:08)  T(F): 98.4 (06 Jul 2023 05:15), Max: 100.9 (06 Jul 2023 00:08)  HR: 59 (06 Jul 2023 07:00) (54 - 65)  BP: 119/58 (06 Jul 2023 07:00) (114/58 - 172/70)  BP(mean): 83 (06 Jul 2023 07:00) (75 - 100)  ABP: 116/38 (06 Jul 2023 07:00) (88/32 - 178/36)  ABP(mean): 61 (06 Jul 2023 07:00) (50 - 96)  RR: 19 (06 Jul 2023 04:00) (14 - 20)  SpO2: 100% (06 Jul 2023 07:00) (94% - 100%)    O2 Parameters below as of 06 Jul 2023 05:00  Patient On (Oxygen Delivery Method): ventilator    O2 Concentration (%): 40        Physical Exam:  General: NAD  HEENT: NC/AT, EOMI, PERRLA, normal hearing, no oral lesions, neck supple w/o LAD  Pulmonary: Nonlabored breathing, no respiratory distress, CTA-B  Cardiovascular: NSR, no murmurs  Abdominal: soft, NT/ND, +BS, no organomegaly  Extremities: WWP, 5/5 strength x 4, no clubbing/cyanosis/edema  Neuro: A/O x3, CNs II-XII grossly intact, normal motor/sensation, no focal deficits  Pulses: palpable distal pulses    Lines/tubes/drains:  [place]:	      Vent settings:  Mode: AC/ CMV (Assist Control/ Continuous Mandatory Ventilation), RR (machine): 14, TV (machine): 550, FiO2: 40, PEEP: 5, ITime: 1, MAP: 7.9, PIP: 15    I&O's Summary    05 Jul 2023 07:01  -  06 Jul 2023 07:00  --------------------------------------------------------  IN: 2543.8 mL / OUT: 1540 mL / NET: 1003.8 mL        LABS:                        10.5   27.14 )-----------( 322      ( 06 Jul 2023 04:50 )             32.0     07-06    138  |  107  |  28<H>  ----------------------------<  185<H>  5.0   |  24  |  1.23    Ca    7.3<L>      06 Jul 2023 04:50  Phos  4.6     07-06  Mg     2.2     07-06    TPro  4.8<L>  /  Alb  2.1<L>  /  TBili  2.6<H>  /  DBili  x   /  AST  95<H>  /  ALT  64<H>  /  AlkPhos  70  07-06    PT/INR - ( 05 Jul 2023 21:44 )   PT: 14.1 sec;   INR: 1.18          PTT - ( 05 Jul 2023 21:44 )  PTT:25.0 sec  Urinalysis Basic - ( 06 Jul 2023 04:50 )    Color: x / Appearance: x / SG: x / pH: x  Gluc: 185 mg/dL / Ketone: x  / Bili: x / Urobili: x   Blood: x / Protein: x / Nitrite: x   Leuk Esterase: x / RBC: x / WBC x   Sq Epi: x / Non Sq Epi: x / Bacteria: x      CAPILLARY BLOOD GLUCOSE      POCT Blood Glucose.: 169 mg/dL (06 Jul 2023 07:26)  POCT Blood Glucose.: 127 mg/dL (05 Jul 2023 23:24)  POCT Blood Glucose.: 92 mg/dL (05 Jul 2023 19:06)    LIVER FUNCTIONS - ( 06 Jul 2023 04:50 )  Alb: 2.1 g/dL / Pro: 4.8 g/dL / ALK PHOS: 70 U/L / ALT: 64 U/L / AST: 95 U/L / GGT: x             Cultures:    RADIOLOGY & ADDITIONAL STUDIES:     ON: Received from OR intubated. Lactate 1.6. FIO2 weaned to .40. Abdominal fluid growing C. tertium/Lactobacillus. Ceftriaxone/Flagyl/Dapto stopped per ID. Imipenum started 1g q8. K 5.4 - EKG WNL, Temp 100.6. IV tylenol given. rare yeast in OR Cx, started Fluc    SUBJECTIVE: Pt seen and examined at bedside this am by ICU team. Sedation weaned and patient reports his pain is controlled. Following commands.       MEDICATIONS  (STANDING):  chlorhexidine 0.12% Liquid 15 milliLiter(s) Oral Mucosa every 12 hours  chlorhexidine 2% Cloths 1 Application(s) Topical <User Schedule>  fentaNYL   Infusion. 0.5 MICROgram(s)/kG/Hr (5.05 mL/Hr) IV Continuous <Continuous>  imipenem/cilastatin  IVPB 1000 milliGRAM(s) IV Intermittent every 8 hours  insulin lispro (ADMELOG) corrective regimen sliding scale   SubCutaneous every 6 hours  levothyroxine Injectable 40 MICROGram(s) IV Push <User Schedule>  lipid, fat emulsion (Fish Oil and Plant Based) 20% Infusion 0.495 Gm/kG/Day (20.8 mL/Hr) IV Continuous <Continuous>  norepinephrine Infusion 0.05 MICROgram(s)/kG/Min (9.47 mL/Hr) IV Continuous <Continuous>  pantoprazole  Injectable 40 milliGRAM(s) IV Push daily  Parenteral Nutrition - Adult 1 Each (83 mL/Hr) TPN Continuous <Continuous>  propofol Infusion 10 MICROgram(s)/kG/Min (6.06 mL/Hr) IV Continuous <Continuous>    MEDICATIONS  (PRN):    ICU Vital Signs Last 24 Hrs  T(C): 36.9 (06 Jul 2023 05:15), Max: 38.3 (06 Jul 2023 00:08)  T(F): 98.4 (06 Jul 2023 05:15), Max: 100.9 (06 Jul 2023 00:08)  HR: 59 (06 Jul 2023 07:00) (54 - 65)  BP: 119/58 (06 Jul 2023 07:00) (114/58 - 172/70)  BP(mean): 83 (06 Jul 2023 07:00) (75 - 100)  ABP: 116/38 (06 Jul 2023 07:00) (88/32 - 178/36)  ABP(mean): 61 (06 Jul 2023 07:00) (50 - 96)  RR: 19 (06 Jul 2023 04:00) (14 - 20)  SpO2: 100% (06 Jul 2023 07:00) (94% - 100%)    O2 Parameters below as of 06 Jul 2023 05:00  Patient On (Oxygen Delivery Method): ventilator    O2 Concentration (%): 40        Physical Exam:  General: NAD  HEENT: NC/AT, EOMI, PERRLA, normal hearing, no oral lesions, neck supple w/o LAD, ETT removed.   Pulmonary: Nonlabored breathing, no respiratory distress, CTA-B. Saturating well on face mask  Cardiovascular: NSR, no murmurs  Abdominal: soft, nondistended, appropriate post surgical tenderess. Ileostomy noted in RUQ - pink and patent with bowel sweat and red rubber. JOYCE x2 noted to be SS. Vicryl mesh in midline wound - repacked with xeroform, kerlix, ABD  Extremities: WWP, B/L 2+ LE edema noted  Neuro: A/O x3, CNs II-XII grossly intact, normal motor/sensation, no focal deficits      I&O's Summary    05 Jul 2023 07:01  -  06 Jul 2023 07:00  --------------------------------------------------------  IN: 2543.8 mL / OUT: 1540 mL / NET: 1003.8 mL        LABS:                        10.5   27.14 )-----------( 322      ( 06 Jul 2023 04:50 )             32.0     07-06    138  |  107  |  28<H>  ----------------------------<  185<H>  5.0   |  24  |  1.23    Ca    7.3<L>      06 Jul 2023 04:50  Phos  4.6     07-06  Mg     2.2     07-06    TPro  4.8<L>  /  Alb  2.1<L>  /  TBili  2.6<H>  /  DBili  x   /  AST  95<H>  /  ALT  64<H>  /  AlkPhos  70  07-06    PT/INR - ( 05 Jul 2023 21:44 )   PT: 14.1 sec;   INR: 1.18          PTT - ( 05 Jul 2023 21:44 )  PTT:25.0 sec  Urinalysis Basic - ( 06 Jul 2023 04:50 )    Color: x / Appearance: x / SG: x / pH: x  Gluc: 185 mg/dL / Ketone: x  / Bili: x / Urobili: x   Blood: x / Protein: x / Nitrite: x   Leuk Esterase: x / RBC: x / WBC x   Sq Epi: x / Non Sq Epi: x / Bacteria: x      CAPILLARY BLOOD GLUCOSE      POCT Blood Glucose.: 169 mg/dL (06 Jul 2023 07:26)  POCT Blood Glucose.: 127 mg/dL (05 Jul 2023 23:24)  POCT Blood Glucose.: 92 mg/dL (05 Jul 2023 19:06)    LIVER FUNCTIONS - ( 06 Jul 2023 04:50 )  Alb: 2.1 g/dL / Pro: 4.8 g/dL / ALK PHOS: 70 U/L / ALT: 64 U/L / AST: 95 U/L / GGT: x             Cultures:    RADIOLOGY & ADDITIONAL STUDIES:

## 2023-07-06 NOTE — PROGRESS NOTE ADULT - SUBJECTIVE AND OBJECTIVE BOX
events noted  reop with ileostomy  extubated this am  sitting in chair now, reclined  AOx3    Pt seen and examined   NGT davonte    REVIEW OF SYSTEMS:  Constitutional: No fever,   Cardiovascular: No chest pain, palpitations, dizziness  Gastrointestinal: No abdominal or epigastric pain. No nausea, vomiting   Skin: No itching, burning, rashes or lesions       MEDICATIONS:  MEDICATIONS  (STANDING):  chlorhexidine 0.12% Liquid 15 milliLiter(s) Oral Mucosa every 12 hours  chlorhexidine 2% Cloths 1 Application(s) Topical <User Schedule>  imipenem/cilastatin  IVPB 1000 milliGRAM(s) IV Intermittent every 8 hours  insulin lispro (ADMELOG) corrective regimen sliding scale   SubCutaneous every 6 hours  levothyroxine Injectable 40 MICROGram(s) IV Push <User Schedule>  lipid, fat emulsion (Fish Oil and Plant Based) 20% Infusion 0.495 Gm/kG/Day (20.83 mL/Hr) IV Continuous <Continuous>  pantoprazole  Injectable 40 milliGRAM(s) IV Push daily  Parenteral Nutrition - Adult 1 Each (83 mL/Hr) TPN Continuous <Continuous>  Parenteral Nutrition - Adult 1 Each (83 mL/Hr) TPN Continuous <Continuous>    MEDICATIONS  (PRN):  HYDROmorphone  Injectable 0.5 milliGRAM(s) IV Push every 4 hours PRN Severe Pain (7 - 10)      Allergies    penicillin (Angioedema)    Intolerances        Vital Signs Last 24 Hrs  T(C): 37.3 (06 Jul 2023 09:00), Max: 38.3 (06 Jul 2023 00:08)  T(F): 99.1 (06 Jul 2023 09:00), Max: 100.9 (06 Jul 2023 00:08)  HR: 69 (06 Jul 2023 10:00) (54 - 73)  BP: 116/58 (06 Jul 2023 10:00) (114/58 - 172/70)  BP(mean): 84 (06 Jul 2023 10:00) (75 - 100)  RR: 18 (06 Jul 2023 09:00) (14 - 20)  SpO2: 100% (06 Jul 2023 10:00) (80% - 100%)    Parameters below as of 06 Jul 2023 11:00  Patient On (Oxygen Delivery Method): nasal cannula w/ humidification        07-05 @ 07:01  -  07-06 @ 07:00  --------------------------------------------------------  IN: 3014.3 mL / OUT: 1940 mL / NET: 1074.3 mL    07-06 @ 07:01  -  07-06 @ 11:41  --------------------------------------------------------  IN: 673.1 mL / OUT: 190 mL / NET: 483.1 mL        PHYSICAL EXAM:    General: ; in no acute distress  HEENT: MMM, conjunctiva and sclera clear, NGT  Gastrointestinal: distended; No  bowel sounds; ileostomy, dressings/packing, JOYCE  Skin: Warm and dry. No obvious rash    LABS:  ABG - ( 06 Jul 2023 05:50 )  pH, Arterial: 7.39  pH, Blood: x     /  pCO2: 43    /  pO2: 117   / HCO3: 26    / Base Excess: 0.8   /  SaO2: 98.9                CBC Full  -  ( 06 Jul 2023 04:50 )  WBC Count : 27.14 K/uL  RBC Count : 3.37 M/uL  Hemoglobin : 10.5 g/dL  Hematocrit : 32.0 %  Platelet Count - Automated : 322 K/uL  Mean Cell Volume : 95.0 fl  Mean Cell Hemoglobin : 31.2 pg  Mean Cell Hemoglobin Concentration : 32.8 gm/dL  Auto Neutrophil # : x  Auto Lymphocyte # : x  Auto Monocyte # : x  Auto Eosinophil # : x  Auto Basophil # : x  Auto Neutrophil % : x  Auto Lymphocyte % : x  Auto Monocyte % : x  Auto Eosinophil % : x  Auto Basophil % : x    07-06    138  |  107  |  28<H>  ----------------------------<  185<H>  5.0   |  24  |  1.23    Ca    7.3<L>      06 Jul 2023 04:50  Phos  4.6     07-06  Mg     2.2     07-06    TPro  4.8<L>  /  Alb  2.1<L>  /  TBili  2.6<H>  /  DBili  x   /  AST  95<H>  /  ALT  64<H>  /  AlkPhos  70  07-06    PT/INR - ( 05 Jul 2023 21:44 )   PT: 14.1 sec;   INR: 1.18          PTT - ( 05 Jul 2023 21:44 )  PTT:25.0 sec      Urinalysis Basic - ( 06 Jul 2023 04:50 )    Color: x / Appearance: x / SG: x / pH: x  Gluc: 185 mg/dL / Ketone: x  / Bili: x / Urobili: x   Blood: x / Protein: x / Nitrite: x   Leuk Esterase: x / RBC: x / WBC x   Sq Epi: x / Non Sq Epi: x / Bacteria: x      Culture - Body Fluid with Gram Stain (07.05.23 @ 17:31)    Gram Stain:   Rare Yeast  Rare Gram Variable Rods  Moderate WBC's   Specimen Source: .Body Fluid right abdomen wound #2 or sec   Culture Results:   Few Yeast Identification to follow.  Culture in progress              RADIOLOGY & ADDITIONAL STUDIES (The following images were personally reviewed):

## 2023-07-06 NOTE — PROGRESS NOTE ADULT - SUBJECTIVE AND OBJECTIVE BOX
INTERVAL HPI/OVERNIGHT EVENTS: Received from OR intubated. Lactate 1.6. FIO2 weaned to .40. Abdominal fluid growing C. tertium/Lactobacillus. Ceftriaxone/Flagyl/Dapto stopped per ID. Imipenum started 1g q8. K 5.4 - EKG WNL, Temp 100.6. IV tylenol given. rare yeast in OR Cx, started Fluc;     STATUS POST:    6/27: Laparoscopic lysis of adhesions, converted to open lysis of adhesions, SBR x 3, temporary abdominal closure, 5L cryst, 1L 5% alb, 3 pRBC, 1 FFP, 1 plts  6/28: ex lap, removal of abthera, ileocolic resection, small bowel anastamosis, , 1.6 crystalloid, 250 5%, 175 uop   7/3: IR Gendel drained perihepatic aspiration of serous fluid.   7/5: RTOR exlap, washout, ileocolic resection with end ileostomy, blow hole colostomy, fistula, red rubber from ileostomy to small bowel anastomosis; vicryl bridging mesh; R JOYCE below ileostomy, L JOYCE at small bowel enterotomy repair; 500 LR, 500 5% albumin, 3u PRBC, 2 FFP, 400 UOP,     SUBJECTIVE: Patient seen and examined at bedside. Remains intubated and sedated, unable to cooperate w/ interview.      imipenem/cilastatin  IVPB 1000 milliGRAM(s) IV Intermittent every 8 hours  norepinephrine Infusion 0.05 MICROgram(s)/kG/Min IV Continuous <Continuous>      Vital Signs Last 24 Hrs  T(C): 36.9 (06 Jul 2023 05:15), Max: 38.3 (06 Jul 2023 00:08)  T(F): 98.4 (06 Jul 2023 05:15), Max: 100.9 (06 Jul 2023 00:08)  HR: 59 (06 Jul 2023 07:00) (54 - 65)  BP: 119/58 (06 Jul 2023 07:00) (114/58 - 172/70)  BP(mean): 83 (06 Jul 2023 07:00) (75 - 100)  RR: 19 (06 Jul 2023 04:00) (14 - 20)  SpO2: 100% (06 Jul 2023 07:00) (94% - 100%)    Parameters below as of 06 Jul 2023 05:00  Patient On (Oxygen Delivery Method): ventilator    O2 Concentration (%): 40  I&O's Detail    05 Jul 2023 07:01  -  06 Jul 2023 07:00  --------------------------------------------------------  IN:    Fat Emulsion (Fish Oil &amp; Plant Based) 20% Infusion: 210 mL    FentaNYL: 80.9 mL    IV PiggyBack: 250 mL    IV PiggyBack: 100 mL    IV PiggyBack: 300 mL    Lactated Ringers: 120 mL    Norepinephrine: 74.1 mL    Propofol: 163.8 mL    TPN (Total Parenteral Nutrition): 1245 mL  Total IN: 2543.8 mL    OUT:    Bulb (mL): 90 mL    Bulb (mL): 20 mL    Indwelling Catheter - Urethral (mL): 940 mL    Nasogastric/Oral tube (mL): 0 mL    Voided (mL): 150 mL  Total OUT: 1200 mL    Total NET: 1343.8 mL          General: NAD, resting comfortably in bed  C/V: NSR  Pulm: Nonlabored breathing, no respiratory distress. Intubated.  Abd: soft, nondistended, dressing taken down, bridging mesh covered in Xeroform intact, ileostomy ppp w/ bowel sweat in pouch, mucous fistula pink and vitalized. Packed wet to dry.  Extrem: WWP, no edema, SCDs in place  Poole to gravity draining yellow urine        LABS:                        10.5   27.14 )-----------( 322      ( 06 Jul 2023 04:50 )             32.0     07-06    138  |  107  |  28<H>  ----------------------------<  185<H>  5.0   |  24  |  1.23    Ca    7.3<L>      06 Jul 2023 04:50  Phos  4.6     07-06  Mg     2.2     07-06    TPro  4.8<L>  /  Alb  2.1<L>  /  TBili  2.6<H>  /  DBili  x   /  AST  95<H>  /  ALT  64<H>  /  AlkPhos  70  07-06    PT/INR - ( 05 Jul 2023 21:44 )   PT: 14.1 sec;   INR: 1.18          PTT - ( 05 Jul 2023 21:44 )  PTT:25.0 sec  Urinalysis Basic - ( 06 Jul 2023 04:50 )    Color: x / Appearance: x / SG: x / pH: x  Gluc: 185 mg/dL / Ketone: x  / Bili: x / Urobili: x   Blood: x / Protein: x / Nitrite: x   Leuk Esterase: x / RBC: x / WBC x   Sq Epi: x / Non Sq Epi: x / Bacteria: x        RADIOLOGY & ADDITIONAL STUDIES:

## 2023-07-06 NOTE — PROGRESS NOTE ADULT - ASSESSMENT
78M w/ Crohn's, AFib/Flutter s/p DCCVs on amiodarone, remote ileocectomy and open appy here for SBO vs Crohns flare, s/p NGT decompression and now s/p lap TRE converted to open TRE, SBR x 3, left in discontinuity with abthera vac on 6/27, RTOR for ileocolic resection, small bowel anastomosis, and abdominal wall closure on 6/28, and s/p IR aspiration of perihepatic fluid on 7/3, stepped down to telemetery on 7/4. wound dehisence on 7/5, RTOR exlap, washout, ileocolic resection with end ileostomy, blow hole colostomy, red rubber from ileostomy to small bowel anastomosis; vicryl bridging mesh on 7/5. Transferred to SICU post op for HD monitoring    Neuro:  Prop/Fent for sedation - Wean sedation today for potential extubation. Hx anxiety: holding venlafaxine while NPO with NGT  CV: Keep MAP > 65. Wean Levophed as tolerated - likely sedation related. Hx of Afib/flutter: s/p DCCV, holding PO amio and metoprolol while NPO, cont lorpessor as IV when BP tolerates; Hx of HLD: holding home rosuvastatin while NPO; TTE (04/22) - PASP 50mmHg, EF 69%.   Pulm: Intubated on 7/5 for RTOR, /12/5/40 - CPAP trial this AM with plan for extubation; s/p Chest tube by CT surg (6/27--7/3)   GI: TPN/lipids, wound dehisense, switch from low residue diet back to NPO, RTOR on 7/5 for exlap, washout, ileocolic resection with end ileostomy, blow hole colostomy, red rubber from ileostomy to small bowel anastomosis; vicryl bridging mesh,    : Po[place], Cr uptrending today - will monitor.  ID: Perforated viscus, IR Cx 7/3: C. tertium, Lactobacillis. OR Cx 7/5 - rare yeast - Imipenem (6/30--), Fluconazole (6/30 -) OR Cx 6/28: Vanc-resistant/amp-sens (but allergic) enterococcus s/p CTX (6/26-6/30), Flagyl (6/26-6/30), Dapto (6/30-7/5).   Endo: ISS; Hx of hypothyroid: cont synthroid IV. Gout: holding home allopurinol while NPO.   PPx: SCD, holding SQH  Lines: PIV x 2, RUE PICC (6/30--), [place] (7/5--) // DC: R Chest tube for iatrogenic PTX (6/27--7/3), R TLC (06/26-30). [place] (6/26-30), New R TLC (7/5 - )  Wounds/Drains: midline incision; xeroform and WTD Kerlix, tid dressing changes; R JOYCE below ileostomy, L JOYCE at small bowel enterotomy repair  PT/OT: not ordered  Dispo: SICU   78M w/ Crohn's, AFib/Flutter s/p DCCVs on amiodarone, remote ileocectomy and open appy here for SBO vs Crohns flare, s/p NGT decompression and now s/p lap TRE converted to open TRE, SBR x 3, left in discontinuity with abthera vac on 6/27, RTOR for ileocolic resection, small bowel anastomosis, and abdominal wall closure on 6/28, and s/p IR aspiration of perihepatic fluid on 7/3, stepped down to telemetery on 7/4. wound dehisence on 7/5, RTOR exlap, washout, ileocolic resection with end ileostomy, blow hole colostomy, red rubber from ileostomy to small bowel anastomosis; vicryl bridging mesh on 7/5. Transferred to SICU post op for HD monitoring. Extubated 7/6.    Neuro: D/C Propofol and Fentanyl. Hx anxiety: holding venlafaxine while NPO with NGT  CV: Keep MAP > 65. Sedation-related levophed weaned off. Hx of Afib/flutter: s/p DCCV, holding PO amio and metoprolol while NPO, Holding lorpessor post OR; Hx of HLD: holding home rosuvastatin while NPO; TTE (04/22) - PASP 50mmHg, EF 69%.   Pulm: Intubated on 7/5 for RTOR, /12/5/40 - weaned to CPAP this AM and extubated to face mask; s/p Chest tube by CT surg (6/27--7/3)   GI: TPN/lipids, wound dehisense, switch from low residue diet back to NPO, RTOR on 7/5 for exlap, washout, ileocolic resection with end ileostomy, blow hole colostomy, red rubber from ileostomy to small bowel anastomosis; vicryl bridging mesh.   : Po[place], Cr uptrending today - will monitor.  ID: Perforated viscus, IR Cx 7/3: C. tertium, Lactobacillis. OR Cx 7/5 - rare yeast - Imipenem (6/30--), Fluconazole (6/30 -) OR Cx 6/28: Vanc-resistant/amp-sens (but allergic) enterococcus s/p CTX (6/26-6/30), Flagyl (6/26-6/30), Dapto (6/30-7/5).   Endo: ISS; Hx of hypothyroid: cont synthroid IV. Gout: holding home allopurinol while NPO.   PPx: SCD, holding SQH  Lines: PIV x 2, RUE PICC (6/30--), [place] (7/5--) // DC: R Chest tube for iatrogenic PTX (6/27--7/3), R TLC (06/26-30). [place] (6/26-30), New R TLC (7/5 - )  Wounds/Drains: midline incision; xeroform and WTD Kerlix, tid dressing changes; R JOYCE below ileostomy, L JOYCE at small bowel enterotomy repair  PT/OT: not ordered  Dispo: SICU

## 2023-07-06 NOTE — PROGRESS NOTE ADULT - SUBJECTIVE AND OBJECTIVE BOX
INFECTIOUS DISEASES CONSULT FOLLOW-UP NOTE    INTERVAL HPI/OVERNIGHT EVENTS:      ROS:   Constitutional, eyes, ENT, cardiovascular, respiratory, gastrointestinal, genitourinary, integumentary, neurological, psychiatric and heme/lymph are otherwise negative other than noted above       ANTIBIOTICS/RELEVANT:    MEDICATIONS  (STANDING):  chlorhexidine 0.12% Liquid 15 milliLiter(s) Oral Mucosa every 12 hours  chlorhexidine 2% Cloths 1 Application(s) Topical <User Schedule>  fentaNYL   Infusion. 0.5 MICROgram(s)/kG/Hr (5.05 mL/Hr) IV Continuous <Continuous>  imipenem/cilastatin  IVPB 1000 milliGRAM(s) IV Intermittent every 8 hours  insulin lispro (ADMELOG) corrective regimen sliding scale   SubCutaneous every 6 hours  levothyroxine Injectable 40 MICROGram(s) IV Push <User Schedule>  lipid, fat emulsion (Fish Oil and Plant Based) 20% Infusion 0.495 Gm/kG/Day (20.8 mL/Hr) IV Continuous <Continuous>  norepinephrine Infusion 0.05 MICROgram(s)/kG/Min (9.47 mL/Hr) IV Continuous <Continuous>  pantoprazole  Injectable 40 milliGRAM(s) IV Push daily  Parenteral Nutrition - Adult 1 Each (83 mL/Hr) TPN Continuous <Continuous>  propofol Infusion 10 MICROgram(s)/kG/Min (6.06 mL/Hr) IV Continuous <Continuous>    MEDICATIONS  (PRN):        Vital Signs Last 24 Hrs  T(C): 36.9 (06 Jul 2023 05:15), Max: 38.3 (06 Jul 2023 00:08)  T(F): 98.4 (06 Jul 2023 05:15), Max: 100.9 (06 Jul 2023 00:08)  HR: 57 (06 Jul 2023 06:00) (54 - 65)  BP: 120/58 (06 Jul 2023 06:00) (114/58 - 172/70)  BP(mean): 83 (06 Jul 2023 06:00) (75 - 100)  RR: 19 (06 Jul 2023 04:00) (14 - 20)  SpO2: 99% (06 Jul 2023 06:00) (94% - 100%)    Parameters below as of 06 Jul 2023 05:00  Patient On (Oxygen Delivery Method): ventilator    O2 Concentration (%): 40    07-04-23 @ 07:01  -  07-05-23 @ 07:00  --------------------------------------------------------  IN: 4539.9 mL / OUT: 2865 mL / NET: 1674.9 mL    07-05-23 @ 07:01  -  07-06-23 @ 06:35  --------------------------------------------------------  IN: 2543.8 mL / OUT: 1200 mL / NET: 1343.8 mL      PHYSICAL EXAM:  Constitutional: alert, NAD  Eyes: the sclera and conjunctiva were normal.   ENT: the ears and nose were normal in appearance.   Neck: the appearance of the neck was normal and the neck was supple.   Pulmonary: no respiratory distress and lungs were clear to auscultation bilaterally.   Heart: heart rate was normal and rhythm regular, normal S1 and S2  Vascular:. there was no peripheral edema  Abdomen: normal bowel sounds, soft, non-tender  Neurological: no focal deficits.   Psychiatric: the affect was normal        LABS:                        10.5   27.14 )-----------( 322      ( 06 Jul 2023 04:50 )             32.0     07-06    138  |  107  |  28<H>  ----------------------------<  185<H>  5.0   |  24  |  1.23    Ca    7.3<L>      06 Jul 2023 04:50  Phos  4.6     07-06  Mg     2.2     07-06    TPro  4.8<L>  /  Alb  2.1<L>  /  TBili  2.6<H>  /  DBili  x   /  AST  95<H>  /  ALT  64<H>  /  AlkPhos  70  07-06    PT/INR - ( 05 Jul 2023 21:44 )   PT: 14.1 sec;   INR: 1.18          PTT - ( 05 Jul 2023 21:44 )  PTT:25.0 sec  Urinalysis Basic - ( 06 Jul 2023 04:50 )    Color: x / Appearance: x / SG: x / pH: x  Gluc: 185 mg/dL / Ketone: x  / Bili: x / Urobili: x   Blood: x / Protein: x / Nitrite: x   Leuk Esterase: x / RBC: x / WBC x   Sq Epi: x / Non Sq Epi: x / Bacteria: x        MICROBIOLOGY:      RADIOLOGY & ADDITIONAL STUDIES:  Reviewed INFECTIOUS DISEASES CONSULT FOLLOW-UP NOTE    INTERVAL HPI/OVERNIGHT EVENTS:  Patient w/ return ot OR yesterday and remained intubated overnight. Now extubated this morning, notes he feels like he has increased secretions but was notably extubated 30 minutes ago. Otherwise, slight post op abdominal pain.     ROS:   Constitutional, eyes, ENT, cardiovascular, respiratory, gastrointestinal, genitourinary, integumentary, neurological, psychiatric and heme/lymph are otherwise negative other than noted above       ANTIBIOTICS/RELEVANT:    MEDICATIONS  (STANDING):  chlorhexidine 0.12% Liquid 15 milliLiter(s) Oral Mucosa every 12 hours  chlorhexidine 2% Cloths 1 Application(s) Topical <User Schedule>  fentaNYL   Infusion. 0.5 MICROgram(s)/kG/Hr (5.05 mL/Hr) IV Continuous <Continuous>  imipenem/cilastatin  IVPB 1000 milliGRAM(s) IV Intermittent every 8 hours  insulin lispro (ADMELOG) corrective regimen sliding scale   SubCutaneous every 6 hours  levothyroxine Injectable 40 MICROGram(s) IV Push <User Schedule>  lipid, fat emulsion (Fish Oil and Plant Based) 20% Infusion 0.495 Gm/kG/Day (20.8 mL/Hr) IV Continuous <Continuous>  norepinephrine Infusion 0.05 MICROgram(s)/kG/Min (9.47 mL/Hr) IV Continuous <Continuous>  pantoprazole  Injectable 40 milliGRAM(s) IV Push daily  Parenteral Nutrition - Adult 1 Each (83 mL/Hr) TPN Continuous <Continuous>  propofol Infusion 10 MICROgram(s)/kG/Min (6.06 mL/Hr) IV Continuous <Continuous>    MEDICATIONS  (PRN):        Vital Signs Last 24 Hrs  T(C): 36.9 (06 Jul 2023 05:15), Max: 38.3 (06 Jul 2023 00:08)  T(F): 98.4 (06 Jul 2023 05:15), Max: 100.9 (06 Jul 2023 00:08)  HR: 57 (06 Jul 2023 06:00) (54 - 65)  BP: 120/58 (06 Jul 2023 06:00) (114/58 - 172/70)  BP(mean): 83 (06 Jul 2023 06:00) (75 - 100)  RR: 19 (06 Jul 2023 04:00) (14 - 20)  SpO2: 99% (06 Jul 2023 06:00) (94% - 100%)    Parameters below as of 06 Jul 2023 05:00  Patient On (Oxygen Delivery Method): ventilator    O2 Concentration (%): 40    07-04-23 @ 07:01  -  07-05-23 @ 07:00  --------------------------------------------------------  IN: 4539.9 mL / OUT: 2865 mL / NET: 1674.9 mL    07-05-23 @ 07:01  -  07-06-23 @ 06:35  --------------------------------------------------------  IN: 2543.8 mL / OUT: 1200 mL / NET: 1343.8 mL      PHYSICAL EXAM:  Constitutional: alert elderly man NAD   Eyes: the sclera and conjunctiva were normal.   ENT: the ears and nose were normal in appearance.   Neck: the appearance of the neck was normal and the neck was supple.   Pulmonary: no respiratory distress and lungs were clear to auscultation bilaterally.   Heart: heart rate was normal and rhythm regular, normal S1 and S2  Vascular: 3+ pitting edema lower extremities   Abdomen: 2 JOYCE drains and abdominal surgical wound covered with clean dressings   Gu: mccauley catheter light yellow urine   Neurological: no focal deficits.   Psychiatric: the affect was normal        LABS:                        10.5   27.14 )-----------( 322      ( 06 Jul 2023 04:50 )             32.0     07-06    138  |  107  |  28<H>  ----------------------------<  185<H>  5.0   |  24  |  1.23    Ca    7.3<L>      06 Jul 2023 04:50  Phos  4.6     07-06  Mg     2.2     07-06    TPro  4.8<L>  /  Alb  2.1<L>  /  TBili  2.6<H>  /  DBili  x   /  AST  95<H>  /  ALT  64<H>  /  AlkPhos  70  07-06    PT/INR - ( 05 Jul 2023 21:44 )   PT: 14.1 sec;   INR: 1.18          PTT - ( 05 Jul 2023 21:44 )  PTT:25.0 sec  Urinalysis Basic - ( 06 Jul 2023 04:50 )    Color: x / Appearance: x / SG: x / pH: x  Gluc: 185 mg/dL / Ketone: x  / Bili: x / Urobili: x   Blood: x / Protein: x / Nitrite: x   Leuk Esterase: x / RBC: x / WBC x   Sq Epi: x / Non Sq Epi: x / Bacteria: x        MICROBIOLOGY:      RADIOLOGY & ADDITIONAL STUDIES:  Reviewed

## 2023-07-06 NOTE — PROGRESS NOTE ADULT - SUBJECTIVE AND OBJECTIVE BOX
S/P 7/5, RTOR exlap, washout, ileocolic resection with end ileostomy, blow hole colostomy, red rubber from ileostomy to small bowel anastomosis; vicryl bridging mesh on 7/5  POD 1       SUBJECTIVE: Patient seen at bed side, sedated, on mechanical ventilation and levophed at time of evaluation. Wound dressing done.    MEDICATIONS  (STANDING):  chlorhexidine 0.12% Liquid 15 milliLiter(s) Oral Mucosa every 12 hours  chlorhexidine 2% Cloths 1 Application(s) Topical <User Schedule>  imipenem/cilastatin  IVPB 1000 milliGRAM(s) IV Intermittent every 8 hours  insulin lispro (ADMELOG) corrective regimen sliding scale   SubCutaneous every 6 hours  levothyroxine Injectable 40 MICROGram(s) IV Push <User Schedule>  lipid, fat emulsion (Fish Oil and Plant Based) 20% Infusion 0.495 Gm/kG/Day (20.8 mL/Hr) IV Continuous <Continuous>  pantoprazole  Injectable 40 milliGRAM(s) IV Push daily  Parenteral Nutrition - Adult 1 Each (83 mL/Hr) TPN Continuous <Continuous>    MEDICATIONS  (PRN):  HYDROmorphone  Injectable 0.5 milliGRAM(s) IV Push every 4 hours PRN Severe Pain (7 - 10)      Drips:     ICU Vital Signs Last 24 Hrs  T(C): 37.3 (06 Jul 2023 09:00), Max: 38.3 (06 Jul 2023 00:08)  T(F): 99.1 (06 Jul 2023 09:00), Max: 100.9 (06 Jul 2023 00:08)  HR: 67 (06 Jul 2023 09:00) (54 - 67)  BP: 128/59 (06 Jul 2023 09:00) (114/58 - 172/70)  BP(mean): 85 (06 Jul 2023 09:00) (75 - 100)  ABP: 140/44 (06 Jul 2023 09:00) (88/32 - 178/36)  ABP(mean): 71 (06 Jul 2023 09:00) (50 - 96)  RR: 19 (06 Jul 2023 04:00) (14 - 20)  SpO2: 97% (06 Jul 2023 09:00) (94% - 100%)    O2 Parameters below as of 06 Jul 2023 05:00  Patient On (Oxygen Delivery Method): ventilator    O2 Concentration (%): 40        Physical Exam:  General: NAD, resting comfortably in bed  C/V: NSR  Pulm: Nonlabored breathing, no respiratory distress. Intubated.  Abd: soft, nondistended, dressing taken down, bridging mesh covered in Xeroform intact, ileostomy ppp w/ bowel sweat in pouch, mucous fistula pink and vitalized. Packed wet to dry.  Right and Left JOYCE in place with serosanguinous output.   Extrem: WWP, no edema, SCDs in place  Poole to gravity draining yellow urine          Vent settings:  Mode: AC/ CMV (Assist Control/ Continuous Mandatory Ventilation), RR (machine): 14, TV (machine): 550, FiO2: 40, PEEP: 5, ITime: 1, MAP: 7.9, PIP: 15    I&O's Summary    05 Jul 2023 07:01  -  06 Jul 2023 07:00  --------------------------------------------------------  IN: 2543.8 mL / OUT: 1540 mL / NET: 1003.8 mL        LABS:                        10.5   27.14 )-----------( 322      ( 06 Jul 2023 04:50 )             32.0     07-06    138  |  107  |  28<H>  ----------------------------<  185<H>  5.0   |  24  |  1.23    Ca    7.3<L>      06 Jul 2023 04:50  Phos  4.6     07-06  Mg     2.2     07-06    TPro  4.8<L>  /  Alb  2.1<L>  /  TBili  2.6<H>  /  DBili  x   /  AST  95<H>  /  ALT  64<H>  /  AlkPhos  70  07-06    PT/INR - ( 05 Jul 2023 21:44 )   PT: 14.1 sec;   INR: 1.18          PTT - ( 05 Jul 2023 21:44 )  PTT:25.0 sec  Urinalysis Basic - ( 06 Jul 2023 04:50 )    Color: x / Appearance: x / SG: x / pH: x  Gluc: 185 mg/dL / Ketone: x  / Bili: x / Urobili: x   Blood: x / Protein: x / Nitrite: x   Leuk Esterase: x / RBC: x / WBC x   Sq Epi: x / Non Sq Epi: x / Bacteria: x      CAPILLARY BLOOD GLUCOSE      POCT Blood Glucose.: 169 mg/dL (06 Jul 2023 07:26)  POCT Blood Glucose.: 127 mg/dL (05 Jul 2023 23:24)  POCT Blood Glucose.: 92 mg/dL (05 Jul 2023 19:06)    LIVER FUNCTIONS - ( 06 Jul 2023 04:50 )  Alb: 2.1 g/dL / Pro: 4.8 g/dL / ALK PHOS: 70 U/L / ALT: 64 U/L / AST: 95 U/L / GGT: x             Cultures:    RADIOLOGY & ADDITIONAL STUDIES:

## 2023-07-06 NOTE — PROGRESS NOTE ADULT - ASSESSMENT
78M h/o Crohn's disease, ileocecectomy, open appy, AFib/Aflutter s/p multiple DCCV p/w acute onset of abdominal pain on 6/23, found to have SBO.  S/p ex-lap, open TRE, resection of small bowell on 6/26. Course c/b pneumothorax s/p R pigtail placement on 6/27, RTOR on 6/28 s/p ileocolic resection with anastomosis, small bowel anastomosis and abdominal wall reconstruction and washout. 6/28 body fluid grow E. casseliflavus. 07/05 return to OR for surgical site dehiscence, was taken back emergently to OR for exlap, found to have anastomotic leak - now s/p exlap with washout and ileostomy/colostomy creation. Abx fluid growing rare yeast started on caspofungin.       Recommendations:   - continue imipenem 1g q8 hrs  - continue fluconazole 400 mg q24  - f/u OR abdominal fluid and surgical swab culture       Team 1 will continue to follow you.  78M h/o Crohn's disease, ileocecectomy, open appy, AFib/Aflutter s/p multiple DCCV p/w acute onset of abdominal pain on 6/23, found to have SBO.  S/p ex-lap, open TRE, resection of small bowell on 6/26. Course c/b pneumothorax s/p R pigtail placement on 6/27, RTOR on 6/28 s/p ileocolic resection with anastomosis, small bowel anastomosis and abdominal wall reconstruction and washout. 6/28 body fluid grow E. casseliflavus. 07/05 return to OR for surgical site dehiscence, was taken back emergently to OR for exlap, found to have anastomotic leak - now s/p exlap with washout and ileostomy/colostomy creation. Abx fluid growing rare yeast started on fluconazole.       Recommendations:   - continue imipenem 1g q8 hrs  - continue fluconazole 400 mg q24  - f/u OR abdominal fluid and surgical swab culture       Team 1 will continue to follow you.

## 2023-07-07 NOTE — PROGRESS NOTE ADULT - ASSESSMENT
78M h/o Crohn's disease, ileocecectomy, open appy, AFib/Aflutter s/p multiple DCCV p/w acute onset of abdominal pain on 6/23, found to have SBO.  S/p ex-lap, open TRE, resection of small bowell on 6/26. Course c/b pneumothorax s/p R pigtail placement on 6/27, RTOR on 6/28 s/p ileocolic resection with anastomosis, small bowel anastomosis and abdominal wall reconstruction and washout. 6/28 body fluid grow E. casseliflavus. 07/05 return to OR for surgical site dehiscence, was taken back emergently to OR for exlap, found to have anastomotic leak - now s/p exlap with washout and ileostomy/colostomy creation. Abx fluid with Candida now on caspofungin.       Recommendations:   INCOMPLETE    Team 1 will continue to follow you.  78M h/o Crohn's disease, ileocecectomy, open appy, AFib/Aflutter s/p multiple DCCV p/w acute onset of abdominal pain on 6/23, found to have SBO.  S/p ex-lap, open TRE, resection of small bowell on 6/26. Course c/b pneumothorax s/p R pigtail placement on 6/27, RTOR on 6/28 s/p ileocolic resection with anastomosis, small bowel anastomosis and abdominal wall reconstruction and washout. 6/28 body fluid grow E. casseliflavus. 07/05 return to OR for surgical site dehiscence, was taken back emergently to OR for exlap, found to have anastomotic leak - now s/p exlap with washout and ileostomy/colostomy creation. Abx fluid with Candida now on fluconazole.      Recommendations:   INCOMPLETE    Team 1 will continue to follow you.  78M h/o Crohn's disease, ileocecectomy, open appy, AFib/Aflutter s/p multiple DCCV p/w acute onset of abdominal pain on 6/23, found to have SBO.  S/p ex-lap, open TRE, resection of small bowell on 6/26. Course c/b pneumothorax s/p R pigtail placement on 6/27, RTOR on 6/28 s/p ileocolic resection with anastomosis, small bowel anastomosis and abdominal wall reconstruction and washout. 6/28 body fluid grow E. casseliflavus. 07/05 return to OR for surgical site dehiscence, was taken back emergently to OR for exlap, found to have anastomotic leak - now s/p exlap with washout and ileostomy/colostomy creation. Abx fluid with Candida now on fluconazole, clinically improving and stable.       Recommendations:   INCOMPLETE    Team 1 will continue to follow you.  78M h/o Crohn's disease, ileocecectomy, open appy, AFib/Aflutter s/p multiple DCCV p/w acute onset of abdominal pain on 6/23, found to have SBO.  S/p ex-lap, open TRE, resection of small bowell on 6/26. Course c/b pneumothorax s/p R pigtail placement on 6/27, RTOR on 6/28 s/p ileocolic resection with anastomosis, small bowel anastomosis and abdominal wall reconstruction and washout. 6/28 body fluid grow E. casseliflavus. 07/05 return to OR for surgical site dehiscence, was taken back emergently to OR for exlap, found to have anastomotic leak - now s/p exlap with washout and ileostomy/colostomy creation. Abx fluid with Candida albicans appropriately on fluconazole, clinically improving and stable.       Recommendations:   - continue imipenem 1g q8 hrs  - continue fluconazole 400 mg q24  - f/u OR abdominal fluid and surgical swab fungal culture     Team 1 will continue to follow you.

## 2023-07-07 NOTE — PROGRESS NOTE ADULT - SUBJECTIVE AND OBJECTIVE BOX
S/P 7/5, RTOR exlap, washout, ileocolic resection with end ileostomy, blow hole colostomy, red rubber from ileostomy to small bowel anastomosis; vicryl bridging mesh on 7/5  POD 2    SUBJECTIVE: Patient seen at bed side, doing well. Wound dressing done, clean wound.    MEDICATIONS  (STANDING):  acetaminophen   IVPB .. 1000 milliGRAM(s) IV Intermittent once  acetaminophen   IVPB .. 1000 milliGRAM(s) IV Intermittent once  aMIOdarone Infusion 0.501 mG/Min (16.7 mL/Hr) IV Continuous <Continuous>  chlorhexidine 0.12% Liquid 15 milliLiter(s) Oral Mucosa every 12 hours  chlorhexidine 2% Cloths 1 Application(s) Topical <User Schedule>  fluconAZOLE IVPB 400 milliGRAM(s) IV Intermittent every 24 hours  heparin   Injectable 5000 Unit(s) SubCutaneous every 8 hours  imipenem/cilastatin  IVPB 1000 milliGRAM(s) IV Intermittent every 8 hours  insulin lispro (ADMELOG) corrective regimen sliding scale   SubCutaneous every 6 hours  levothyroxine Injectable 40 MICROGram(s) IV Push <User Schedule>  lipid, fat emulsion (Fish Oil and Plant Based) 20% Infusion 0.495 Gm/kG/Day (20.83 mL/Hr) IV Continuous <Continuous>  pantoprazole  Injectable 40 milliGRAM(s) IV Push daily  Parenteral Nutrition - Adult 1 Each (83 mL/Hr) TPN Continuous <Continuous>  Parenteral Nutrition - Adult 1 Each (83 mL/Hr) TPN Continuous <Continuous>    MEDICATIONS  (PRN):  HYDROmorphone  Injectable 0.5 milliGRAM(s) IV Push every 4 hours PRN Severe Pain (7 - 10)      Drips:     ICU Vital Signs Last 24 Hrs  T(C): 36.2 (07 Jul 2023 13:14), Max: 37.2 (07 Jul 2023 09:34)  T(F): 97.2 (07 Jul 2023 13:14), Max: 98.9 (07 Jul 2023 09:34)  HR: 58 (07 Jul 2023 13:14) (58 - 73)  BP: 163/72 (07 Jul 2023 13:00) (106/57 - 193/58)  BP(mean): 104 (07 Jul 2023 13:00) (79 - 120)  ABP: 149/50 (07 Jul 2023 13:14) (81/56 - 189/54)  ABP(mean): 80 (07 Jul 2023 13:14) (66 - 119)  RR: 22 (07 Jul 2023 13:14) (16 - 25)  SpO2: 94% (07 Jul 2023 13:14) (93% - 100%)    O2 Parameters below as of 07 Jul 2023 13:00  Patient On (Oxygen Delivery Method): room air            Physical Exam:  General: NAD, resting comfortably in bed  C/V: NSR  Pulm: Nonlabored breathing, no respiratory distress. Intubated.  Abd: soft, nondistended, dressing taken down, bridging mesh covered in Xeroform intact, ileostomy ppp w/ bowel sweat in pouch, mucous fistula dark pink and vitalized. Packed wet to dry.  Right and Left JOYCE in place with serosanguinous output.   Extrem: WWP, no edema, SCDs in place  Poole to gravity draining yellow urine        Vent settings:      I&O's Summary    06 Jul 2023 07:01  -  07 Jul 2023 07:00  --------------------------------------------------------  IN: 3770.5 mL / OUT: 2585 mL / NET: 1185.5 mL    07 Jul 2023 07:01  -  07 Jul 2023 14:05  --------------------------------------------------------  IN: 1221.1 mL / OUT: 550 mL / NET: 671.1 mL        LABS:                        9.2    19.65 )-----------( 330      ( 07 Jul 2023 05:20 )             28.7     07-07    138  |  106  |  26<H>  ----------------------------<  147<H>  4.0   |  26  |  1.05    Ca    6.8<L>      07 Jul 2023 05:20  Phos  3.6     07-07  Mg     2.4     07-07    TPro  4.7<L>  /  Alb  1.9<L>  /  TBili  2.5<H>  /  DBili  2.0<H>  /  AST  83<H>  /  ALT  53<H>  /  AlkPhos  66  07-07    PT/INR - ( 05 Jul 2023 21:44 )   PT: 14.1 sec;   INR: 1.18          PTT - ( 05 Jul 2023 21:44 )  PTT:25.0 sec  Urinalysis Basic - ( 07 Jul 2023 05:20 )    Color: x / Appearance: x / SG: x / pH: x  Gluc: 147 mg/dL / Ketone: x  / Bili: x / Urobili: x   Blood: x / Protein: x / Nitrite: x   Leuk Esterase: x / RBC: x / WBC x   Sq Epi: x / Non Sq Epi: x / Bacteria: x      CAPILLARY BLOOD GLUCOSE      POCT Blood Glucose.: 117 mg/dL (07 Jul 2023 12:24)  POCT Blood Glucose.: 93 mg/dL (07 Jul 2023 06:39)  POCT Blood Glucose.: 146 mg/dL (06 Jul 2023 23:16)  POCT Blood Glucose.: 147 mg/dL (06 Jul 2023 17:42)    LIVER FUNCTIONS - ( 07 Jul 2023 05:20 )  Alb: 1.9 g/dL / Pro: 4.7 g/dL / ALK PHOS: 66 U/L / ALT: 53 U/L / AST: 83 U/L / GGT: x             Cultures:Culture Results:   Few Candida albicans  Rare Clostridium tertium  Rare Enterococcus gallinarum  Rare Enterococcus casseliflavus  Rare Lactobacillus rhamnosus  Culture in progress (07-05 @ 17:31)  Culture Results:   Few Candida albicans  Culture in progress (07-05 @ 17:31)

## 2023-07-07 NOTE — PROGRESS NOTE ADULT - ASSESSMENT
78M POD9 laparoscopic lysis of adhesions, converted to open lysis of adhesions, SBR x 3, temporary abdominal closure, POD8 ex lap, removal of abthera, ileocolic resection, small bowel anastomosis, POD2 RTOR exlap, washout, ileocolic resection with end ileostomy, blow hole colostomy, fistula, red rubber from ileostomy to small bowel anastomosis; vicryl bridging mesh; R JOYCE below ileostomy, L JOYCE at small bowel enterotomy repair.    Plan:  - Continue dressing change TID  - Abdominal binder at all times  - Continue routine close examination of drains, ensure well-secured and stitch intact, reinforce when necessary  - Restart HSQ when possible  - Rest of care per primary  - Appreciate excellent SICU care 78M POD9 laparoscopic lysis of adhesions, converted to open lysis of adhesions, SBR x 3, temporary abdominal closure, POD8 ex lap, removal of abthera, ileocolic resection, small bowel anastomosis c/b anastomotic leak resulting in wound dehiscence now POD2 RTOR exlap, washout, ileocolic resection with end ileostomy, blow hole colostomy, fistula, red rubber from ileostomy to small bowel anastomosis; vicryl bridging mesh; R JOYCE below ileostomy, L JOYCE at small bowel enterotomy repair.    Plan:  - Continue dressing change TID  - Abdominal binder at all times  - Continue routine close examination of drains, ensure well-secured and stitch intact, reinforce when necessary  - Restart HSQ when possible  - Rest of care per primary  - Appreciate excellent SICU care

## 2023-07-07 NOTE — PROGRESS NOTE ADULT - SUBJECTIVE AND OBJECTIVE BOX
Pt seen and examined   no complaints  wbc better    REVIEW OF SYSTEMS:  Constitutional: No fever,  Cardiovascular: No chest pain, palpitations, dizziness  Gastrointestinal: No abdominal or epigastric pain. No nausea, vomiting  Skin: No itching, burning, rashes or lesions       MEDICATIONS:  MEDICATIONS  (STANDING):  acetaminophen   IVPB .. 1000 milliGRAM(s) IV Intermittent once  acetaminophen   IVPB .. 1000 milliGRAM(s) IV Intermittent once  aMIOdarone Infusion 0.501 mG/Min (16.7 mL/Hr) IV Continuous <Continuous>  chlorhexidine 0.12% Liquid 15 milliLiter(s) Oral Mucosa every 12 hours  chlorhexidine 2% Cloths 1 Application(s) Topical <User Schedule>  fluconAZOLE IVPB 400 milliGRAM(s) IV Intermittent every 24 hours  heparin   Injectable 5000 Unit(s) SubCutaneous every 8 hours  imipenem/cilastatin  IVPB 1000 milliGRAM(s) IV Intermittent every 8 hours  insulin lispro (ADMELOG) corrective regimen sliding scale   SubCutaneous every 6 hours  levothyroxine Injectable 40 MICROGram(s) IV Push <User Schedule>  lipid, fat emulsion (Fish Oil and Plant Based) 20% Infusion 0.495 Gm/kG/Day (20.83 mL/Hr) IV Continuous <Continuous>  pantoprazole  Injectable 40 milliGRAM(s) IV Push daily  Parenteral Nutrition - Adult 1 Each (83 mL/Hr) TPN Continuous <Continuous>  Parenteral Nutrition - Adult 1 Each (83 mL/Hr) TPN Continuous <Continuous>    MEDICATIONS  (PRN):  HYDROmorphone  Injectable 0.5 milliGRAM(s) IV Push every 4 hours PRN Severe Pain (7 - 10)      Allergies    penicillin (Angioedema)    Intolerances        Vital Signs Last 24 Hrs  T(C): 37.2 (07 Jul 2023 09:34), Max: 37.2 (07 Jul 2023 09:34)  T(F): 98.9 (07 Jul 2023 09:34), Max: 98.9 (07 Jul 2023 09:34)  HR: 65 (07 Jul 2023 13:00) (60 - 75)  BP: 163/72 (07 Jul 2023 13:00) (106/57 - 193/58)  BP(mean): 104 (07 Jul 2023 13:00) (79 - 120)  RR: 20 (07 Jul 2023 13:00) (16 - 25)  SpO2: 95% (07 Jul 2023 13:00) (93% - 100%)    Parameters below as of 07 Jul 2023 13:00  Patient On (Oxygen Delivery Method): room air        07-06 @ 07:01  -  07-07 @ 07:00  --------------------------------------------------------  IN: 3770.5 mL / OUT: 2585 mL / NET: 1185.5 mL    07-07 @ 07:01  -  07-07 @ 13:07  --------------------------------------------------------  IN: 1121.4 mL / OUT: 475 mL / NET: 646.4 mL        PHYSICAL EXAM:    General:  in no acute distress  HEENT: MMM, conjunctiva and sclera clear, ngt  Gastrointestinal: Soft non-tender non-distended; No bs, ileostomy viable but no output yet  Skin: Warm and dry.    LABS:  ABG - ( 06 Jul 2023 05:50 )  pH, Arterial: 7.39  pH, Blood: x     /  pCO2: 43    /  pO2: 117   / HCO3: 26    / Base Excess: 0.8   /  SaO2: 98.9                CBC Full  -  ( 07 Jul 2023 05:20 )  WBC Count : 19.65 K/uL  RBC Count : 3.00 M/uL  Hemoglobin : 9.2 g/dL  Hematocrit : 28.7 %  Platelet Count - Automated : 330 K/uL  Mean Cell Volume : 95.7 fl  Mean Cell Hemoglobin : 30.7 pg  Mean Cell Hemoglobin Concentration : 32.1 gm/dL  Auto Neutrophil # : x  Auto Lymphocyte # : x  Auto Monocyte # : x  Auto Eosinophil # : x  Auto Basophil # : x  Auto Neutrophil % : x  Auto Lymphocyte % : x  Auto Monocyte % : x  Auto Eosinophil % : x  Auto Basophil % : x    07-07    138  |  106  |  26<H>  ----------------------------<  147<H>  4.0   |  26  |  1.05    Ca    6.8<L>      07 Jul 2023 05:20  Phos  3.6     07-07  Mg     2.4     07-07    TPro  4.7<L>  /  Alb  1.9<L>  /  TBili  2.5<H>  /  DBili  2.0<H>  /  AST  83<H>  /  ALT  53<H>  /  AlkPhos  66  07-07    PT/INR - ( 05 Jul 2023 21:44 )   PT: 14.1 sec;   INR: 1.18          PTT - ( 05 Jul 2023 21:44 )  PTT:25.0 sec      Urinalysis Basic - ( 07 Jul 2023 05:20 )    Color: x / Appearance: x / SG: x / pH: x  Gluc: 147 mg/dL / Ketone: x  / Bili: x / Urobili: x   Blood: x / Protein: x / Nitrite: x   Leuk Esterase: x / RBC: x / WBC x   Sq Epi: x / Non Sq Epi: x / Bacteria: x                RADIOLOGY & ADDITIONAL STUDIES (The following images were personally reviewed):

## 2023-07-07 NOTE — PROGRESS NOTE ADULT - SUBJECTIVE AND OBJECTIVE BOX
ON: 500cc LR, SBPs 180s labet 10mg x 1 improv to 120s-130s; ostomy consult      SUBJECTIVE: Pt seen and examined at bedside this am by ICU team. Patient is lying comfortably in bed. Reports abdominal pain is well controlled. Denies nausea, emesis. Denies fevers, chills.    MEDICATIONS  (STANDING):  acetaminophen   IVPB .. 1000 milliGRAM(s) IV Intermittent once  acetaminophen   IVPB .. 1000 milliGRAM(s) IV Intermittent once  aMIOdarone Infusion 0.501 mG/Min (16.7 mL/Hr) IV Continuous <Continuous>  chlorhexidine 0.12% Liquid 15 milliLiter(s) Oral Mucosa every 12 hours  chlorhexidine 2% Cloths 1 Application(s) Topical <User Schedule>  fluconAZOLE IVPB 400 milliGRAM(s) IV Intermittent every 24 hours  heparin   Injectable 5000 Unit(s) SubCutaneous every 8 hours  imipenem/cilastatin  IVPB 1000 milliGRAM(s) IV Intermittent every 8 hours  insulin lispro (ADMELOG) corrective regimen sliding scale   SubCutaneous every 6 hours  levothyroxine Injectable 40 MICROGram(s) IV Push <User Schedule>  lipid, fat emulsion (Fish Oil and Plant Based) 20% Infusion 0.495 Gm/kG/Day (20.83 mL/Hr) IV Continuous <Continuous>  pantoprazole  Injectable 40 milliGRAM(s) IV Push daily  Parenteral Nutrition - Adult 1 Each (83 mL/Hr) TPN Continuous <Continuous>  Parenteral Nutrition - Adult 1 Each (83 mL/Hr) TPN Continuous <Continuous>    MEDICATIONS  (PRN):  HYDROmorphone  Injectable 0.5 milliGRAM(s) IV Push every 4 hours PRN Severe Pain (7 - 10)      ICU Vital Signs Last 24 Hrs  T(C): 37.2 (07 Jul 2023 09:34), Max: 37.2 (07 Jul 2023 09:34)  T(F): 98.9 (07 Jul 2023 09:34), Max: 98.9 (07 Jul 2023 09:34)  HR: 67 (07 Jul 2023 10:00) (60 - 75)  BP: 163/70 (07 Jul 2023 10:00) (106/57 - 193/58)  BP(mean): 101 (07 Jul 2023 10:00) (79 - 110)  ABP: 159/48 (07 Jul 2023 10:00) (81/56 - 179/54)  ABP(mean): 85 (07 Jul 2023 10:00) (66 - 119)  RR: 20 (07 Jul 2023 09:00) (16 - 20)  SpO2: 95% (07 Jul 2023 10:00) (93% - 100%)    O2 Parameters below as of 07 Jul 2023 09:00  Patient On (Oxygen Delivery Method): room air            Physical Exam:  General: NAD  HEENT: NC/AT, EOMI, PERRLA, normal hearing, no oral lesions, neck supple w/o LAD, NGT in nares noted with bilious output  Pulmonary: Nonlabored breathing, no respiratory distress, CTA B/L, saturating well on RA  Cardiovascular: NSR, no murmurs  Abdominal: soft, minimal distention, appropriate tenderness to palpation. JOYCE x2 - noted to be SS and serous. Ostomy pink and patent with bowel sweat noted. Midline wound open with bridging vicryl mesh  Extremities: WWP, 2+ edema noted b/l LE  Neuro: A/O x3, CNs II-XII grossly intact, normal motor/sensation, no focal deficits      Lines/tubes/drains: R IJV  Virgilio: present    Vent settings:      I&O's Summary    06 Jul 2023 07:01  -  07 Jul 2023 07:00  --------------------------------------------------------  IN: 3770.5 mL / OUT: 2585 mL / NET: 1185.5 mL    07 Jul 2023 07:01  -  07 Jul 2023 11:55  --------------------------------------------------------  IN: 83 mL / OUT: 225 mL / NET: -142 mL        LABS:                        9.2    19.65 )-----------( 330      ( 07 Jul 2023 05:20 )             28.7     07-07    138  |  106  |  26<H>  ----------------------------<  147<H>  4.0   |  26  |  1.05    Ca    6.8<L>      07 Jul 2023 05:20  Phos  3.6     07-07  Mg     2.4     07-07    TPro  4.7<L>  /  Alb  1.9<L>  /  TBili  2.5<H>  /  DBili  2.0<H>  /  AST  83<H>  /  ALT  53<H>  /  AlkPhos  66  07-07    PT/INR - ( 05 Jul 2023 21:44 )   PT: 14.1 sec;   INR: 1.18          PTT - ( 05 Jul 2023 21:44 )  PTT:25.0 sec  Urinalysis Basic - ( 07 Jul 2023 05:20 )    Color: x / Appearance: x / SG: x / pH: x  Gluc: 147 mg/dL / Ketone: x  / Bili: x / Urobili: x   Blood: x / Protein: x / Nitrite: x   Leuk Esterase: x / RBC: x / WBC x   Sq Epi: x / Non Sq Epi: x / Bacteria: x      CAPILLARY BLOOD GLUCOSE      POCT Blood Glucose.: 93 mg/dL (07 Jul 2023 06:39)  POCT Blood Glucose.: 146 mg/dL (06 Jul 2023 23:16)  POCT Blood Glucose.: 147 mg/dL (06 Jul 2023 17:42)    LIVER FUNCTIONS - ( 07 Jul 2023 05:20 )  Alb: 1.9 g/dL / Pro: 4.7 g/dL / ALK PHOS: 66 U/L / ALT: 53 U/L / AST: 83 U/L / GGT: x             Cultures:Culture Results:   Culture in progress (07-05 @ 17:31)  Culture Results:   Few Candida species Identification to follow.  Culture in progress (07-05 @ 17:31)      RADIOLOGY & ADDITIONAL STUDIES:

## 2023-07-07 NOTE — PROGRESS NOTE ADULT - ASSESSMENT
78M w/ Crohn's, AFib/Flutter s/p DCCVs on amiodarone, remote ileocectomy and open appy here for SBO vs Crohns flare, s/p NGT decompression and now s/p lap TRE converted to open TRE, SBR x 3, left in discontinuity with abthera vac on 6/27, RTOR for ileocolic resection, small bowel anastomosis, and abdominal wall closure on 6/28, and s/p IR aspiration of perihepatic fluid on 7/3, stepped down to telemetery on 7/4. wound dehisence on 7/5, RTOR exlap, washout, ileocolic resection with end ileostomy, blow hole colostomy, red rubber from ileostomy to small bowel anastomosis; vicryl bridging mesh on 7/5. Transferred to SICU post op for HD monitoring. Extubated 7/6.    Neuro: Tylenol RTC. Dilaudid PRN. Hx anxiety: holding venlafaxine while NPO with NGT  CV: Keep MAP > 65. Hx of Afib/flutter: s/p DCCV, restarting amiodarone gtt while NPO. Holding lorpessor post OR; Hx of HLD: holding home rosuvastatin while NPO; TTE (04/22) - PASP 50mmHg, EF 69%.   Pulm: Extubated 7/6 - now on RA saturating well. s/p Chest tube by CT surg (6/27--7/3)   GI: TPN/lipids, wound dehisense -- RTOR on 7/5 for exlap, washout, ileocolic resection with end ileostomy, blow hole colostomy, red rubber from ileostomy to small bowel anastomosis; vicryl bridging mesh - continue TID dressing changes  : Cr stable. D/C Po[place] today - pending TOV  ID: Perforated viscus, IR Cx 7/3: C. tertium, Lactobacillis. OR Cx 7/5 - rare yeast - Imipenem (6/30--), Fluconazole (6/30 -) OR Cx 6/28: Vanc-resistant/amp-sens (but allergic) enterococcus s/p CTX (6/26-6/30), Flagyl (6/26-6/30), Dapto (6/30-7/5).   Endo: ISS; Hx of hypothyroid: cont synthroid IV. Gout: holding home allopurinol while NPO.   PPx: SCD, Restart HSQ.  Lines: PIV x 2, RUE PICC (6/30--), [place] (7/5--) // DC: R Chest tube for iatrogenic PTX (6/27--7/3), R TLC (06/26-30). [place] (6/26-30), New R TLC (7/5 - )  Wounds/Drains: midline incision; xeroform and WTD Kerlix, tid dressing changes; R JOYCE below ileostomy, L JOYCE at small bowel enterotomy repair  PT/OT: not ordered  Dispo: SICU

## 2023-07-07 NOTE — ADVANCED PRACTICE NURSE CONSULT - ASSESSMENT
78M POD9 laparoscopic lysis of adhesions, converted to open lysis of adhesions, SBR x 3, temporary abdominal closure, POD8 ex lap, removal of abthera, ileocolic resection, small bowel anastomosis c/b anastomotic leak resulting in wound dehiscence now POD2 RTOR exlap, washout, ileocolic resection with end ileostomy, blow hole colostomy, fistula, red rubber from ileostomy to small bowel anastomosis; vicryl bridging mesh; R JOYCE below ileostomy, L JOYCE at small bowel enterotomy repair. Stoma very dusky, surgery team notified. Blood seeping from around stoma, skin protected with Cavilon barrier wipes and stoma ring used beneath new 1 3/4" Jayleen 2 piece appliance. Pt alert and awake, no teaching performed at this time.

## 2023-07-07 NOTE — PROGRESS NOTE ADULT - ASSESSMENT
78M POD8 laparoscopic lysis of adhesions, converted to open lysis of adhesions, SBR x 3, temporary abdominal closure, POD8 ex lap, removal of abthera, ileocolic resection, small bowel anastomosis, POD1 RTOR exlap, washout, ileocolic resection with end ileostomy, blow hole colostomy, fistula, red rubber from ileostomy to small bowel anastomosis; vicryl bridging mesh; R JOYCE below ileostomy, L JOYCE at small bowel enterotomy repair. Off vent doing well. Clean wound.     Plan:     - TID dressing change   - Poole removal   - Resume HSQ  - Monitor drain outputs   - Rest of care as per SICU team   - Team 1 will follow

## 2023-07-07 NOTE — PROGRESS NOTE ADULT - SUBJECTIVE AND OBJECTIVE BOX
INTERVAL HPI/OVERNIGHT EVENTS: 500cc LR, SBPs 180s labet 10mg x 1 improv to 120s-130s; ostomy consult    STATUS POST:   6/27: Laparoscopic lysis of adhesions, converted to open lysis of adhesions, SBR x 3, temporary abdominal closure, 5L cryst, 1L 5% alb, 3 pRBC, 1 FFP, 1 plts  6/28: ex lap, removal of abthera, ileocolic resection, small bowel anastamosis, , 1.6 crystalloid, 250 5%, 175 uop   7/3: IR Gendel drained perihepatic aspiration of serous fluid.   7/5: RTOR exlap, washout, ileocolic resection with end ileostomy, blow hole colostomy, fistula, red rubber from ileostomy to small bowel anastomosis; vicryl bridging mesh; R JOYCE below ileostomy, L JOYCE at small bowel enterotomy repair; 500 LR, 500 5% albumin, 3u PRBC, 2 FFP, 400 UOP,     SUBJECTIVE: Patient seen and examined at bedside. In good spirits, denied abdominal pain, n/v, cp, sob. Encouraged OOBA.      fluconAZOLE IVPB 400 milliGRAM(s) IV Intermittent every 24 hours  imipenem/cilastatin  IVPB 1000 milliGRAM(s) IV Intermittent every 8 hours      Vital Signs Last 24 Hrs  T(C): 37.2 (07 Jul 2023 09:34), Max: 37.3 (06 Jul 2023 11:00)  T(F): 98.9 (07 Jul 2023 09:34), Max: 99.1 (06 Jul 2023 11:00)  HR: 66 (07 Jul 2023 08:00) (60 - 75)  BP: 157/70 (07 Jul 2023 08:00) (106/57 - 193/58)  BP(mean): 100 (07 Jul 2023 08:00) (79 - 105)  RR: 16 (07 Jul 2023 05:00) (16 - 20)  SpO2: 95% (07 Jul 2023 08:00) (90% - 100%)    Parameters below as of 07 Jul 2023 08:00  Patient On (Oxygen Delivery Method): room air      I&O's Detail    06 Jul 2023 07:01  -  07 Jul 2023 07:00  --------------------------------------------------------  IN:    Fat Emulsion (Fish Oil &amp; Plant Based) 20% Infusion: 270.4 mL    FentaNYL: 5.1 mL    IV PiggyBack: 100 mL    IV PiggyBack: 500 mL    IV PiggyBack: 500 mL    IV PiggyBack: 200 mL    IV PiggyBack: 200 mL    Propofol: 3 mL    TPN (Total Parenteral Nutrition): 1992 mL  Total IN: 3770.5 mL    OUT:    Bulb (mL): 130 mL    Bulb (mL): 30 mL    Fat Emulsion (Fish Oil &amp; Plant Based) 20% Infusion: 0 mL    Ileostomy (mL): 70 mL    Indwelling Catheter - Urethral (mL): 1355 mL    Nasogastric/Oral tube (mL): 1000 mL    Norepinephrine: 0 mL  Total OUT: 2585 mL    Total NET: 1185.5 mL      07 Jul 2023 07:01  -  07 Jul 2023 09:45  --------------------------------------------------------  IN:    TPN (Total Parenteral Nutrition): 83 mL  Total IN: 83 mL    OUT:  Total OUT: 0 mL    Total NET: 83 mL          General: NAD, resting comfortably in bed  C/V: NSR  Pulm: Nonlabored breathing, no respiratory distress  Abd: soft, nondistended, dressing taken down, bridging mesh covered in Xeroform intact, ileostomy ppp w/ bowel sweat in pouch, mucous fistula pink and vitalized. Packed wet to dry. R JOYCE SS output, L JOYCE serous.  Extrem: WWP, no edema, SCDs in place        LABS:                        9.2    19.65 )-----------( 330      ( 07 Jul 2023 05:20 )             28.7     07-07    138  |  106  |  26<H>  ----------------------------<  147<H>  4.0   |  26  |  1.05    Ca    6.8<L>      07 Jul 2023 05:20  Phos  3.6     07-07  Mg     2.4     07-07    TPro  4.7<L>  /  Alb  1.9<L>  /  TBili  2.5<H>  /  DBili  2.0<H>  /  AST  83<H>  /  ALT  53<H>  /  AlkPhos  66  07-07    PT/INR - ( 05 Jul 2023 21:44 )   PT: 14.1 sec;   INR: 1.18          PTT - ( 05 Jul 2023 21:44 )  PTT:25.0 sec  Urinalysis Basic - ( 07 Jul 2023 05:20 )    Color: x / Appearance: x / SG: x / pH: x  Gluc: 147 mg/dL / Ketone: x  / Bili: x / Urobili: x   Blood: x / Protein: x / Nitrite: x   Leuk Esterase: x / RBC: x / WBC x   Sq Epi: x / Non Sq Epi: x / Bacteria: x        RADIOLOGY & ADDITIONAL STUDIES:

## 2023-07-07 NOTE — PROGRESS NOTE ADULT - SUBJECTIVE AND OBJECTIVE BOX
INFECTIOUS DISEASES CONSULT FOLLOW-UP NOTE    INTERVAL HPI/OVERNIGHT EVENTS:      ROS:   Constitutional, eyes, ENT, cardiovascular, respiratory, gastrointestinal, genitourinary, integumentary, neurological, psychiatric and heme/lymph are otherwise negative other than noted above       ANTIBIOTICS/RELEVANT:    MEDICATIONS  (STANDING):  acetaminophen   IVPB .. 1000 milliGRAM(s) IV Intermittent once  acetaminophen   IVPB .. 1000 milliGRAM(s) IV Intermittent once  acetaminophen   IVPB .. 1000 milliGRAM(s) IV Intermittent once  chlorhexidine 0.12% Liquid 15 milliLiter(s) Oral Mucosa every 12 hours  chlorhexidine 2% Cloths 1 Application(s) Topical <User Schedule>  fluconAZOLE IVPB 400 milliGRAM(s) IV Intermittent every 24 hours  imipenem/cilastatin  IVPB 1000 milliGRAM(s) IV Intermittent every 8 hours  insulin lispro (ADMELOG) corrective regimen sliding scale   SubCutaneous every 6 hours  levothyroxine Injectable 40 MICROGram(s) IV Push <User Schedule>  lipid, fat emulsion (Fish Oil and Plant Based) 20% Infusion 0.495 Gm/kG/Day (20.83 mL/Hr) IV Continuous <Continuous>  pantoprazole  Injectable 40 milliGRAM(s) IV Push daily  Parenteral Nutrition - Adult 1 Each (83 mL/Hr) TPN Continuous <Continuous>    MEDICATIONS  (PRN):  HYDROmorphone  Injectable 0.5 milliGRAM(s) IV Push every 4 hours PRN Severe Pain (7 - 10)        Vital Signs Last 24 Hrs  T(C): 36.1 (07 Jul 2023 05:36), Max: 37.3 (06 Jul 2023 09:00)  T(F): 97 (07 Jul 2023 05:36), Max: 99.1 (06 Jul 2023 09:00)  HR: 62 (07 Jul 2023 06:00) (59 - 75)  BP: 160/72 (07 Jul 2023 06:00) (106/57 - 193/58)  BP(mean): 103 (07 Jul 2023 06:00) (79 - 103)  RR: 16 (07 Jul 2023 05:00) (16 - 20)  SpO2: 95% (07 Jul 2023 06:00) (80% - 100%)    Parameters below as of 07 Jul 2023 07:00  Patient On (Oxygen Delivery Method): room air        07-05-23 @ 07:01  -  07-06-23 @ 07:00  --------------------------------------------------------  IN: 3014.3 mL / OUT: 1940 mL / NET: 1074.3 mL    07-06-23 @ 07:01  -  07-07-23 @ 06:52  --------------------------------------------------------  IN: 3770.5 mL / OUT: 2545 mL / NET: 1225.5 mL      PHYSICAL EXAM:  Constitutional: alert, NAD  Eyes: the sclera and conjunctiva were normal.   ENT: the ears and nose were normal in appearance.   Neck: the appearance of the neck was normal and the neck was supple.   Pulmonary: no respiratory distress and lungs were clear to auscultation bilaterally.   Heart: heart rate was normal and rhythm regular, normal S1 and S2  Vascular:. there was no peripheral edema  Abdomen: normal bowel sounds, soft, non-tender  Neurological: no focal deficits.   Psychiatric: the affect was normal        LABS:                        9.2    19.65 )-----------( 330      ( 07 Jul 2023 05:20 )             28.7     07-07    138  |  106  |  26<H>  ----------------------------<  147<H>  4.0   |  26  |  1.05    Ca    6.8<L>      07 Jul 2023 05:20  Phos  3.6     07-07  Mg     2.4     07-07    TPro  4.7<L>  /  Alb  1.9<L>  /  TBili  2.5<H>  /  DBili  2.0<H>  /  AST  83<H>  /  ALT  53<H>  /  AlkPhos  66  07-07    PT/INR - ( 05 Jul 2023 21:44 )   PT: 14.1 sec;   INR: 1.18          PTT - ( 05 Jul 2023 21:44 )  PTT:25.0 sec  Urinalysis Basic - ( 07 Jul 2023 05:20 )    Color: x / Appearance: x / SG: x / pH: x  Gluc: 147 mg/dL / Ketone: x  / Bili: x / Urobili: x   Blood: x / Protein: x / Nitrite: x   Leuk Esterase: x / RBC: x / WBC x   Sq Epi: x / Non Sq Epi: x / Bacteria: x        MICROBIOLOGY:      RADIOLOGY & ADDITIONAL STUDIES:  Reviewed INFECTIOUS DISEASES CONSULT FOLLOW-UP NOTE    INTERVAL HPI/OVERNIGHT EVENTS:  Patient feels well this morning, no complaints. Abdominal dressing changed this morning, afebrile without pain.       ROS:   Constitutional, eyes, ENT, cardiovascular, respiratory, gastrointestinal, genitourinary, integumentary, neurological, psychiatric and heme/lymph are otherwise negative other than noted above       ANTIBIOTICS/RELEVANT:    MEDICATIONS  (STANDING):  acetaminophen   IVPB .. 1000 milliGRAM(s) IV Intermittent once  acetaminophen   IVPB .. 1000 milliGRAM(s) IV Intermittent once  acetaminophen   IVPB .. 1000 milliGRAM(s) IV Intermittent once  chlorhexidine 0.12% Liquid 15 milliLiter(s) Oral Mucosa every 12 hours  chlorhexidine 2% Cloths 1 Application(s) Topical <User Schedule>  fluconAZOLE IVPB 400 milliGRAM(s) IV Intermittent every 24 hours  imipenem/cilastatin  IVPB 1000 milliGRAM(s) IV Intermittent every 8 hours  insulin lispro (ADMELOG) corrective regimen sliding scale   SubCutaneous every 6 hours  levothyroxine Injectable 40 MICROGram(s) IV Push <User Schedule>  lipid, fat emulsion (Fish Oil and Plant Based) 20% Infusion 0.495 Gm/kG/Day (20.83 mL/Hr) IV Continuous <Continuous>  pantoprazole  Injectable 40 milliGRAM(s) IV Push daily  Parenteral Nutrition - Adult 1 Each (83 mL/Hr) TPN Continuous <Continuous>    MEDICATIONS  (PRN):  HYDROmorphone  Injectable 0.5 milliGRAM(s) IV Push every 4 hours PRN Severe Pain (7 - 10)        Vital Signs Last 24 Hrs  T(C): 36.1 (07 Jul 2023 05:36), Max: 37.3 (06 Jul 2023 09:00)  T(F): 97 (07 Jul 2023 05:36), Max: 99.1 (06 Jul 2023 09:00)  HR: 62 (07 Jul 2023 06:00) (59 - 75)  BP: 160/72 (07 Jul 2023 06:00) (106/57 - 193/58)  BP(mean): 103 (07 Jul 2023 06:00) (79 - 103)  RR: 16 (07 Jul 2023 05:00) (16 - 20)  SpO2: 95% (07 Jul 2023 06:00) (80% - 100%)    Parameters below as of 07 Jul 2023 07:00  Patient On (Oxygen Delivery Method): room air        07-05-23 @ 07:01  -  07-06-23 @ 07:00  --------------------------------------------------------  IN: 3014.3 mL / OUT: 1940 mL / NET: 1074.3 mL    07-06-23 @ 07:01  -  07-07-23 @ 06:52  --------------------------------------------------------  IN: 3770.5 mL / OUT: 2545 mL / NET: 1225.5 mL      PHYSICAL EXAM:  Constitutional: alert, NAD  Eyes: the sclera and conjunctiva were normal.   ENT: the ears and nose were normal in appearance.   Neck: the appearance of the neck was normal and the neck was supple.   Pulmonary: no respiratory distress and lungs were clear to auscultation bilaterally.   Heart: heart rate was normal and rhythm regular, normal S1 and S2  Vascular:. there was no peripheral edema  Abdomen: Abdomen dressed with 2 JOYCE drains with serosanguinis fluid   Neurological: no focal deficits.   Psychiatric: the affect was normal        LABS:                        9.2    19.65 )-----------( 330      ( 07 Jul 2023 05:20 )             28.7     07-07    138  |  106  |  26<H>  ----------------------------<  147<H>  4.0   |  26  |  1.05    Ca    6.8<L>      07 Jul 2023 05:20  Phos  3.6     07-07  Mg     2.4     07-07    TPro  4.7<L>  /  Alb  1.9<L>  /  TBili  2.5<H>  /  DBili  2.0<H>  /  AST  83<H>  /  ALT  53<H>  /  AlkPhos  66  07-07    PT/INR - ( 05 Jul 2023 21:44 )   PT: 14.1 sec;   INR: 1.18          PTT - ( 05 Jul 2023 21:44 )  PTT:25.0 sec  Urinalysis Basic - ( 07 Jul 2023 05:20 )    Color: x / Appearance: x / SG: x / pH: x  Gluc: 147 mg/dL / Ketone: x  / Bili: x / Urobili: x   Blood: x / Protein: x / Nitrite: x   Leuk Esterase: x / RBC: x / WBC x   Sq Epi: x / Non Sq Epi: x / Bacteria: x        MICROBIOLOGY:      RADIOLOGY & ADDITIONAL STUDIES:  Reviewed

## 2023-07-08 NOTE — PROGRESS NOTE ADULT - SUBJECTIVE AND OBJECTIVE BOX
Awake and alert given lasix overnight BP slightly increased Afeb In RR Good BS ant LE edema unchanged WBCs decreased Creatinine and lytes ok

## 2023-07-08 NOTE — PROGRESS NOTE ADULT - SUBJECTIVE AND OBJECTIVE BOX
Pt seen and examined     REVIEW OF SYSTEMS:  Constitutional: No fever, weight loss or fatigue  Cardiovascular: No chest pain, palpitations, dizziness  Gastrointestinal: No abdominal or epigastric pain. No nausea, vomiting  Skin: No itching, burning, rashes or lesions       MEDICATIONS:  MEDICATIONS  (STANDING):  acetaminophen   IVPB .. 1000 milliGRAM(s) IV Intermittent once  aMIOdarone Infusion 0.501 mG/Min (16.7 mL/Hr) IV Continuous <Continuous>  chlorhexidine 2% Cloths 1 Application(s) Topical <User Schedule>  dextrose 5%. 1000 milliLiter(s) (50 mL/Hr) IV Continuous <Continuous>  dextrose 5%. 1000 milliLiter(s) (100 mL/Hr) IV Continuous <Continuous>  dextrose 50% Injectable 25 Gram(s) IV Push once  dextrose 50% Injectable 12.5 Gram(s) IV Push once  dextrose 50% Injectable 25 Gram(s) IV Push once  fluconAZOLE IVPB 400 milliGRAM(s) IV Intermittent every 24 hours  glucagon  Injectable 1 milliGRAM(s) IntraMuscular once  heparin   Injectable 5000 Unit(s) SubCutaneous every 8 hours  hydrALAZINE Injectable 10 milliGRAM(s) IV Push every 6 hours  imipenem/cilastatin  IVPB 1000 milliGRAM(s) IV Intermittent every 8 hours  insulin lispro (ADMELOG) corrective regimen sliding scale   SubCutaneous every 6 hours  levothyroxine Injectable 40 MICROGram(s) IV Push <User Schedule>  lipid, fat emulsion (Fish Oil and Plant Based) 20% Infusion 0.5 Gm/kG/Day (20.83 mL/Hr) IV Continuous <Continuous>  metoprolol tartrate Injectable 5 milliGRAM(s) IV Push every 6 hours  pantoprazole  Injectable 40 milliGRAM(s) IV Push daily  Parenteral Nutrition - Adult 1 Each (83 mL/Hr) TPN Continuous <Continuous>  Parenteral Nutrition - Adult 1 Each (83 mL/Hr) TPN Continuous <Continuous>    MEDICATIONS  (PRN):  dextrose Oral Gel 15 Gram(s) Oral once PRN Blood Glucose LESS THAN 70 milliGRAM(s)/deciliter  HYDROmorphone  Injectable 0.5 milliGRAM(s) IV Push every 4 hours PRN Severe Pain (7 - 10)      Allergies    penicillin (Angioedema)    Intolerances        Vital Signs Last 24 Hrs  T(C): 36.4 (08 Jul 2023 09:00), Max: 37.1 (07 Jul 2023 22:10)  T(F): 97.6 (08 Jul 2023 09:00), Max: 98.7 (07 Jul 2023 22:10)  HR: 59 (08 Jul 2023 13:15) (58 - 71)  BP: 172/76 (08 Jul 2023 13:15) (139/106 - 196/78)  BP(mean): 109 (08 Jul 2023 13:15) (95 - 142)  RR: 20 (08 Jul 2023 13:15) (16 - 28)  SpO2: 93% (08 Jul 2023 13:15) (91% - 98%)    Parameters below as of 08 Jul 2023 13:15  Patient On (Oxygen Delivery Method): room air        07-07 @ 07:01  -  07-08 @ 07:00  --------------------------------------------------------  IN: 4426.4 mL / OUT: 2810 mL / NET: 1616.4 mL    07-08 @ 07:01  -  07-08 @ 13:51  --------------------------------------------------------  IN: 697.9 mL / OUT: 850 mL / NET: -152.1 mL        PHYSICAL EXAM:    General:  in no acute distress  HEENT: MMM, conjunctiva and sclera clear, NGT  Gastrointestinal: Soft non-tender non-distended; No bowel sounds; ileostomy pink  Skin: Warm and dry. No obvious rash    LABS:      CBC Full  -  ( 08 Jul 2023 05:10 )  WBC Count : 16.84 K/uL  RBC Count : 3.22 M/uL  Hemoglobin : 10.0 g/dL  Hematocrit : 31.0 %  Platelet Count - Automated : 450 K/uL  Mean Cell Volume : 96.3 fl  Mean Cell Hemoglobin : 31.1 pg  Mean Cell Hemoglobin Concentration : 32.3 gm/dL  Auto Neutrophil # : x  Auto Lymphocyte # : x  Auto Monocyte # : x  Auto Eosinophil # : x  Auto Basophil # : x  Auto Neutrophil % : x  Auto Lymphocyte % : x  Auto Monocyte % : x  Auto Eosinophil % : x  Auto Basophil % : x    07-08    135  |  103  |  29<H>  ----------------------------<  123<H>  4.1   |  25  |  0.95    Ca    6.9<L>      08 Jul 2023 05:10  Phos  2.9     07-08  Mg     2.2     07-08    TPro  5.6<L>  /  Alb  1.9<L>  /  TBili  2.5<H>  /  DBili  2.0<H>  /  AST  103<H>  /  ALT  61<H>  /  AlkPhos  80  07-08          Urinalysis Basic - ( 08 Jul 2023 05:10 )    Color: x / Appearance: x / SG: x / pH: x  Gluc: 123 mg/dL / Ketone: x  / Bili: x / Urobili: x   Blood: x / Protein: x / Nitrite: x   Leuk Esterase: x / RBC: x / WBC x   Sq Epi: x / Non Sq Epi: x / Bacteria: x                RADIOLOGY & ADDITIONAL STUDIES (The following images were personally reviewed):

## 2023-07-08 NOTE — CHART NOTE - NSCHARTNOTEFT_GEN_A_CORE
Infectious Diseases Anti-infective Approval Note    Medication: imipenem  Dose*: 1g  Route: IV  Frequency: q8h  Duration**: approved thru 7/11/23    *Dose may be adjusted as needed for alterations in renal function.    **Reflects duration of approval and not necessarily duration of therapy     THIS IS NOT AN INFECTIOUS DISEASES CONSULTATION

## 2023-07-08 NOTE — PROGRESS NOTE ADULT - SUBJECTIVE AND OBJECTIVE BOX
INTERVAL HPI/OVERNIGHT EVENTS: AMRITA.    CONSTITUTIONAL:  Negative fever or chills, feels well, good appetite  EYES:  Negative  blurry vision or double vision  CARDIOVASCULAR:  Negative for chest pain or palpitations  RESPIRATORY:  Negative for cough, wheezing, or SOB   GASTROINTESTINAL:  Negative for nausea, vomiting, diarrhea, constipation, or abdominal pain  GENITOURINARY:  Negative frequency, urgency or dysuria  NEUROLOGIC:  No headache, confusion, dizziness, lightheadedness      ANTIBIOTICS/RELEVANT:    MEDICATIONS  (STANDING):  acetaminophen   IVPB .. 1000 milliGRAM(s) IV Intermittent once  aMIOdarone Infusion 0.501 mG/Min (16.7 mL/Hr) IV Continuous <Continuous>  chlorhexidine 2% Cloths 1 Application(s) Topical <User Schedule>  dextrose 5%. 1000 milliLiter(s) (100 mL/Hr) IV Continuous <Continuous>  dextrose 5%. 1000 milliLiter(s) (50 mL/Hr) IV Continuous <Continuous>  dextrose 50% Injectable 25 Gram(s) IV Push once  dextrose 50% Injectable 12.5 Gram(s) IV Push once  dextrose 50% Injectable 25 Gram(s) IV Push once  fluconAZOLE IVPB 400 milliGRAM(s) IV Intermittent every 24 hours  glucagon  Injectable 1 milliGRAM(s) IntraMuscular once  heparin   Injectable 5000 Unit(s) SubCutaneous every 8 hours  hydrALAZINE Injectable 10 milliGRAM(s) IV Push every 6 hours  imipenem/cilastatin  IVPB 1000 milliGRAM(s) IV Intermittent every 8 hours  insulin lispro (ADMELOG) corrective regimen sliding scale   SubCutaneous every 6 hours  levothyroxine Injectable 40 MICROGram(s) IV Push <User Schedule>  lipid, fat emulsion (Fish Oil and Plant Based) 20% Infusion 0.5 Gm/kG/Day (20.83 mL/Hr) IV Continuous <Continuous>  metoprolol tartrate Injectable 5 milliGRAM(s) IV Push every 6 hours  pantoprazole  Injectable 40 milliGRAM(s) IV Push daily  Parenteral Nutrition - Adult 1 Each (83 mL/Hr) TPN Continuous <Continuous>  Parenteral Nutrition - Adult 1 Each (83 mL/Hr) TPN Continuous <Continuous>    MEDICATIONS  (PRN):  dextrose Oral Gel 15 Gram(s) Oral once PRN Blood Glucose LESS THAN 70 milliGRAM(s)/deciliter  hydrALAZINE Injectable 10 milliGRAM(s) IV Push every 6 hours PRN SBP> 160  HYDROmorphone  Injectable 0.5 milliGRAM(s) IV Push every 4 hours PRN Severe Pain (7 - 10)        Vital Signs Last 24 Hrs  T(C): 36.4 (08 Jul 2023 09:00), Max: 37.1 (07 Jul 2023 22:10)  T(F): 97.6 (08 Jul 2023 09:00), Max: 98.7 (07 Jul 2023 22:10)  HR: 59 (08 Jul 2023 14:30) (58 - 71)  BP: 178/99 (08 Jul 2023 14:30) (139/106 - 205/85)  BP(mean): 133 (08 Jul 2023 14:30) (95 - 142)  RR: 18 (08 Jul 2023 14:30) (16 - 26)  SpO2: 94% (08 Jul 2023 14:30) (91% - 97%)    Parameters below as of 08 Jul 2023 14:30  Patient On (Oxygen Delivery Method): room air        PHYSICAL EXAM:  Constitutional: NAD  Eyes: PATRICIA, EOMI  Ear/Nose/Throat: no oral lesion, no sinus tenderness on percussion	  Neck: no JVD, no lymphadenopathy, supple  Respiratory: CTA jesus  Cardiovascular: S1S2 RRR, no murmurs  Gastrointestinal:soft, (+) BS, no HSM  Extremities:no e/e/c  Vascular: DP Pulse:	right normal; left normal      LABS:                        10.0   16.84 )-----------( 450      ( 08 Jul 2023 05:10 )             31.0     07-08    135  |  103  |  29<H>  ----------------------------<  123<H>  4.1   |  25  |  0.95    Ca    6.9<L>      08 Jul 2023 05:10  Phos  2.9     07-08  Mg     2.2     07-08    TPro  5.6<L>  /  Alb  1.9<L>  /  TBili  2.5<H>  /  DBili  2.0<H>  /  AST  103<H>  /  ALT  61<H>  /  AlkPhos  80  07-08      Urinalysis Basic - ( 08 Jul 2023 05:10 )    Color: x / Appearance: x / SG: x / pH: x  Gluc: 123 mg/dL / Ketone: x  / Bili: x / Urobili: x   Blood: x / Protein: x / Nitrite: x   Leuk Esterase: x / RBC: x / WBC x   Sq Epi: x / Non Sq Epi: x / Bacteria: x        MICROBIOLOGY: reviewed    RADIOLOGY & ADDITIONAL STUDIES: reviewed

## 2023-07-08 NOTE — PROGRESS NOTE ADULT - SUBJECTIVE AND OBJECTIVE BOX
ON: FS 69 - given 1 amp. Repeat 130. HTN 's. Labetolol 10mg given x1. Persistently HTN. HR in the high 50's. 10 hydralazine given. Increasing sob, Apical B-line bilaterally, small bilateral effusions, remains HTN. 20 lasix given x1.     SUBJECTIVE: Patient seen and examined at bedside. In good spirits, states pain is well-controlled, denies n/v, cp, sob.    MEDICATIONS  (STANDING):  acetaminophen   IVPB .. 1000 milliGRAM(s) IV Intermittent once  aMIOdarone Infusion 0.501 mG/Min (16.7 mL/Hr) IV Continuous <Continuous>  chlorhexidine 2% Cloths 1 Application(s) Topical <User Schedule>  dextrose 5%. 1000 milliLiter(s) (50 mL/Hr) IV Continuous <Continuous>  dextrose 5%. 1000 milliLiter(s) (100 mL/Hr) IV Continuous <Continuous>  dextrose 50% Injectable 25 Gram(s) IV Push once  dextrose 50% Injectable 25 Gram(s) IV Push once  dextrose 50% Injectable 12.5 Gram(s) IV Push once  fluconAZOLE IVPB 400 milliGRAM(s) IV Intermittent every 24 hours  glucagon  Injectable 1 milliGRAM(s) IntraMuscular once  heparin   Injectable 5000 Unit(s) SubCutaneous every 8 hours  hydrALAZINE Injectable 10 milliGRAM(s) IV Push every 6 hours  imipenem/cilastatin  IVPB 1000 milliGRAM(s) IV Intermittent every 8 hours  insulin lispro (ADMELOG) corrective regimen sliding scale   SubCutaneous every 6 hours  levothyroxine Injectable 40 MICROGram(s) IV Push <User Schedule>  lipid, fat emulsion (Fish Oil and Plant Based) 20% Infusion 0.495 Gm/kG/Day (20.83 mL/Hr) IV Continuous <Continuous>  metoprolol tartrate Injectable 5 milliGRAM(s) IV Push every 6 hours  pantoprazole  Injectable 40 milliGRAM(s) IV Push daily  Parenteral Nutrition - Adult 1 Each (83 mL/Hr) TPN Continuous <Continuous>    MEDICATIONS  (PRN):  dextrose Oral Gel 15 Gram(s) Oral once PRN Blood Glucose LESS THAN 70 milliGRAM(s)/deciliter  HYDROmorphone  Injectable 0.5 milliGRAM(s) IV Push every 4 hours PRN Severe Pain (7 - 10)      Drips:     ICU Vital Signs Last 24 Hrs  T(C): 36.4 (08 Jul 2023 09:00), Max: 37.1 (07 Jul 2023 22:10)  T(F): 97.6 (08 Jul 2023 09:00), Max: 98.7 (07 Jul 2023 22:10)  HR: 62 (08 Jul 2023 09:00) (58 - 71)  BP: 179/75 (08 Jul 2023 09:00) (139/106 - 196/77)  BP(mean): 108 (08 Jul 2023 09:00) (95 - 142)  ABP: 183/48 (07 Jul 2023 18:00) (149/50 - 189/54)  ABP(mean): 95 (07 Jul 2023 18:00) (80 - 100)  RR: 23 (08 Jul 2023 09:00) (16 - 28)  SpO2: 95% (08 Jul 2023 09:00) (91% - 98%)    O2 Parameters below as of 08 Jul 2023 09:00  Patient On (Oxygen Delivery Method): room air            Physical Exam:  General: NAD  HEENT: NC/AT, EOMI, PERRLA, normal hearing, no oral lesions, neck supple w/o LAD  Pulmonary: Nonlabored breathing, no respiratory distress, CTA-B  Cardiovascular: NSR, no murmurs  Abdominal: soft, nontender, midline laparotomy w/ bridging mesh intact. Ostomy present, patent, however appears congested unchanged from prior exam. Mucous fistula pink and vitalized. L JOYCE serous output, R JOYCE SS.  Extremities: WWP, 5/5 strength x 4, no clubbing/cyanosis/edema  Neuro: A/O x3, CNs II-XII grossly intact, normal motor/sensation, no focal deficits  Pulses: palpable distal pulses        I&O's Summary    07 Jul 2023 07:01  -  08 Jul 2023 07:00  --------------------------------------------------------  IN: 4426.4 mL / OUT: 2810 mL / NET: 1616.4 mL    08 Jul 2023 07:01  -  08 Jul 2023 09:47  --------------------------------------------------------  IN: 199.4 mL / OUT: 600 mL / NET: -400.6 mL        LABS:                        10.0   16.84 )-----------( 450      ( 08 Jul 2023 05:10 )             31.0     07-08    135  |  103  |  29<H>  ----------------------------<  123<H>  4.1   |  25  |  0.95    Ca    6.9<L>      08 Jul 2023 05:10  Phos  2.9     07-08  Mg     2.2     07-08    TPro  5.6<L>  /  Alb  1.9<L>  /  TBili  2.5<H>  /  DBili  2.0<H>  /  AST  103<H>  /  ALT  61<H>  /  AlkPhos  80  07-08      Urinalysis Basic - ( 08 Jul 2023 05:10 )    Color: x / Appearance: x / SG: x / pH: x  Gluc: 123 mg/dL / Ketone: x  / Bili: x / Urobili: x   Blood: x / Protein: x / Nitrite: x   Leuk Esterase: x / RBC: x / WBC x   Sq Epi: x / Non Sq Epi: x / Bacteria: x      CAPILLARY BLOOD GLUCOSE      POCT Blood Glucose.: 121 mg/dL (08 Jul 2023 06:30)  POCT Blood Glucose.: 95 mg/dL (07 Jul 2023 23:31)  POCT Blood Glucose.: 130 mg/dL (07 Jul 2023 19:49)  POCT Blood Glucose.: 69 mg/dL (07 Jul 2023 19:23)  POCT Blood Glucose.: 63 mg/dL (07 Jul 2023 19:22)  POCT Blood Glucose.: 117 mg/dL (07 Jul 2023 12:24)    LIVER FUNCTIONS - ( 08 Jul 2023 05:10 )  Alb: 1.9 g/dL / Pro: 5.6 g/dL / ALK PHOS: 80 U/L / ALT: 61 U/L / AST: 103 U/L / GGT: x             Cultures:    RADIOLOGY & ADDITIONAL STUDIES:     ON: FS 69 - given 1 amp. Repeat 130. HTN 's. Labetolol 10mg given x1. Persistently HTN. HR in the high 50's. 10 hydralazine given. Increasing sob, Apical B-line bilaterally, small bilateral effusions, remains HTN. 20 lasix given x1.     SUBJECTIVE: Patient seen and examined at bedside w/ attending. In good spirits, states pain is well-controlled, denies n/v, cp, sob. Voiding without issues.    MEDICATIONS  (STANDING):  acetaminophen   IVPB .. 1000 milliGRAM(s) IV Intermittent once  aMIOdarone Infusion 0.501 mG/Min (16.7 mL/Hr) IV Continuous <Continuous>  chlorhexidine 2% Cloths 1 Application(s) Topical <User Schedule>  dextrose 5%. 1000 milliLiter(s) (50 mL/Hr) IV Continuous <Continuous>  dextrose 5%. 1000 milliLiter(s) (100 mL/Hr) IV Continuous <Continuous>  dextrose 50% Injectable 25 Gram(s) IV Push once  dextrose 50% Injectable 25 Gram(s) IV Push once  dextrose 50% Injectable 12.5 Gram(s) IV Push once  fluconAZOLE IVPB 400 milliGRAM(s) IV Intermittent every 24 hours  glucagon  Injectable 1 milliGRAM(s) IntraMuscular once  heparin   Injectable 5000 Unit(s) SubCutaneous every 8 hours  hydrALAZINE Injectable 10 milliGRAM(s) IV Push every 6 hours  imipenem/cilastatin  IVPB 1000 milliGRAM(s) IV Intermittent every 8 hours  insulin lispro (ADMELOG) corrective regimen sliding scale   SubCutaneous every 6 hours  levothyroxine Injectable 40 MICROGram(s) IV Push <User Schedule>  lipid, fat emulsion (Fish Oil and Plant Based) 20% Infusion 0.495 Gm/kG/Day (20.83 mL/Hr) IV Continuous <Continuous>  metoprolol tartrate Injectable 5 milliGRAM(s) IV Push every 6 hours  pantoprazole  Injectable 40 milliGRAM(s) IV Push daily  Parenteral Nutrition - Adult 1 Each (83 mL/Hr) TPN Continuous <Continuous>    MEDICATIONS  (PRN):  dextrose Oral Gel 15 Gram(s) Oral once PRN Blood Glucose LESS THAN 70 milliGRAM(s)/deciliter  HYDROmorphone  Injectable 0.5 milliGRAM(s) IV Push every 4 hours PRN Severe Pain (7 - 10)      Drips:     ICU Vital Signs Last 24 Hrs  T(C): 36.4 (08 Jul 2023 09:00), Max: 37.1 (07 Jul 2023 22:10)  T(F): 97.6 (08 Jul 2023 09:00), Max: 98.7 (07 Jul 2023 22:10)  HR: 62 (08 Jul 2023 09:00) (58 - 71)  BP: 179/75 (08 Jul 2023 09:00) (139/106 - 196/77)  BP(mean): 108 (08 Jul 2023 09:00) (95 - 142)  ABP: 183/48 (07 Jul 2023 18:00) (149/50 - 189/54)  ABP(mean): 95 (07 Jul 2023 18:00) (80 - 100)  RR: 23 (08 Jul 2023 09:00) (16 - 28)  SpO2: 95% (08 Jul 2023 09:00) (91% - 98%)    O2 Parameters below as of 08 Jul 2023 09:00  Patient On (Oxygen Delivery Method): room air            Physical Exam:  General: NAD  HEENT: NC/AT, EOMI, PERRLA, normal hearing, no oral lesions, neck supple w/o LAD  Pulmonary: Nonlabored breathing, no respiratory distress, CTA-B  Cardiovascular: NSR, no murmurs  Abdominal: soft, nontender, midline laparotomy w/ bridging mesh intact. Ostomy present, patent, however appears congested unchanged from prior exam. Mucous fistula pink and vitalized. L JOYCE serous output, R JOYCE SS.  Extremities: WWP, 5/5 strength x 4, no clubbing/cyanosis/edema  Neuro: A/O x3, CNs II-XII grossly intact, normal motor/sensation, no focal deficits  Pulses: palpable distal pulses        I&O's Summary    07 Jul 2023 07:01  -  08 Jul 2023 07:00  --------------------------------------------------------  IN: 4426.4 mL / OUT: 2810 mL / NET: 1616.4 mL    08 Jul 2023 07:01  -  08 Jul 2023 09:47  --------------------------------------------------------  IN: 199.4 mL / OUT: 600 mL / NET: -400.6 mL        LABS:                        10.0   16.84 )-----------( 450      ( 08 Jul 2023 05:10 )             31.0     07-08    135  |  103  |  29<H>  ----------------------------<  123<H>  4.1   |  25  |  0.95    Ca    6.9<L>      08 Jul 2023 05:10  Phos  2.9     07-08  Mg     2.2     07-08    TPro  5.6<L>  /  Alb  1.9<L>  /  TBili  2.5<H>  /  DBili  2.0<H>  /  AST  103<H>  /  ALT  61<H>  /  AlkPhos  80  07-08      Urinalysis Basic - ( 08 Jul 2023 05:10 )    Color: x / Appearance: x / SG: x / pH: x  Gluc: 123 mg/dL / Ketone: x  / Bili: x / Urobili: x   Blood: x / Protein: x / Nitrite: x   Leuk Esterase: x / RBC: x / WBC x   Sq Epi: x / Non Sq Epi: x / Bacteria: x      CAPILLARY BLOOD GLUCOSE      POCT Blood Glucose.: 121 mg/dL (08 Jul 2023 06:30)  POCT Blood Glucose.: 95 mg/dL (07 Jul 2023 23:31)  POCT Blood Glucose.: 130 mg/dL (07 Jul 2023 19:49)  POCT Blood Glucose.: 69 mg/dL (07 Jul 2023 19:23)  POCT Blood Glucose.: 63 mg/dL (07 Jul 2023 19:22)  POCT Blood Glucose.: 117 mg/dL (07 Jul 2023 12:24)    LIVER FUNCTIONS - ( 08 Jul 2023 05:10 )  Alb: 1.9 g/dL / Pro: 5.6 g/dL / ALK PHOS: 80 U/L / ALT: 61 U/L / AST: 103 U/L / GGT: x             Cultures:    RADIOLOGY & ADDITIONAL STUDIES:

## 2023-07-08 NOTE — PROGRESS NOTE ADULT - ASSESSMENT
78M w/ Crohn's, AFib/Flutter s/p DCCVs on amiodarone, remote ileocectomy and open appy here for SBO vs Crohns flare, s/p NGT decompression and now s/p lap TRE converted to open TRE, SBR x 3, left in discontinuity with abthera vac on 6/27, RTOR for ileocolic resection, small bowel anastomosis, and abdominal wall closure on 6/28, and s/p IR aspiration of perihepatic fluid on 7/3, stepped down to telemetery on 7/4. wound dehisence on 7/5, RTOR exlap, washout, ileocolic resection with end ileostomy, blow hole colostomy, red rubber from ileostomy to small bowel anastomosis; vicryl bridging mesh on 7/5. Transferred to SICU post op for HD monitoring. Extubated 7/6.    Neuro: Tylenol RTC. Dilaudid PRN. Hx anxiety: holding venlafaxine while NPO with NGT  CV: Keep MAP > 65. Hx of Afib/flutter: s/p DCCV, restarting amiodarone gtt while NPO. Restarted lorpessor  5q6, started hydralazine 10mg q6h standing; Hx of HLD: holding home rosuvastatin while NPO; TTE (04/22) - PASP 50mmHg, EF 69%.   Pulm: Extubated 7/6 - now on RA saturating well. s/p Chest tube by CT surg (6/27--7/3)   GI: TPN/lipids, wound dehisense -- RTOR on 7/5 for exlap, washout, ileocolic resection with end ileostomy, blow hole colostomy, red rubber from ileostomy to small bowel anastomosis; vicryl bridging mesh - continue TID dressing changes  : Cr stable. Voids.  ID: Perforated viscus, IR Cx 7/3: C. tertium, Lactobacillis. OR Cx 7/5 - rare yeast - Imipenem (6/30--), Fluconazole (6/30 -) OR Cx 6/28: Vanc-resistant/amp-sens (but allergic) enterococcus s/p CTX (6/26-6/30), Flagyl (6/26-6/30), Dapto (6/30-7/5).   Endo: ISS; Hx of hypothyroid: cont synthroid IV. Gout: holding home allopurinol while NPO.   PPx: SCD, Restart HSQ.  Lines: PIV x 2, RUE PICC (6/30--), [place] (7/5--) // DC: R Chest tube for iatrogenic PTX (6/27--7/3), R TLC (06/26-30). [place] (6/26-30), New R TLC (7/5 - )  Wounds/Drains: midline incision; xeroform and WTD Kerlix, tid dressing changes; R JOYCE below ileostomy, L JOYCE at small bowel enterotomy repair  PT/OT: not ordered  Dispo: SICU

## 2023-07-08 NOTE — PROGRESS NOTE ADULT - SUBJECTIVE AND OBJECTIVE BOX
SUBJECTIVE:      MEDICATIONS  (STANDING):  acetaminophen   IVPB .. 1000 milliGRAM(s) IV Intermittent once  acetaminophen   IVPB .. 1000 milliGRAM(s) IV Intermittent once  aMIOdarone Infusion 0.501 mG/Min (16.7 mL/Hr) IV Continuous <Continuous>  chlorhexidine 2% Cloths 1 Application(s) Topical <User Schedule>  dextrose 5%. 1000 milliLiter(s) (50 mL/Hr) IV Continuous <Continuous>  dextrose 5%. 1000 milliLiter(s) (100 mL/Hr) IV Continuous <Continuous>  dextrose 50% Injectable 25 Gram(s) IV Push once  dextrose 50% Injectable 12.5 Gram(s) IV Push once  dextrose 50% Injectable 25 Gram(s) IV Push once  fluconAZOLE IVPB 400 milliGRAM(s) IV Intermittent every 24 hours  glucagon  Injectable 1 milliGRAM(s) IntraMuscular once  heparin   Injectable 5000 Unit(s) SubCutaneous every 8 hours  hydrALAZINE Injectable 10 milliGRAM(s) IV Push every 6 hours  imipenem/cilastatin  IVPB 1000 milliGRAM(s) IV Intermittent every 8 hours  insulin lispro (ADMELOG) corrective regimen sliding scale   SubCutaneous every 6 hours  levothyroxine Injectable 40 MICROGram(s) IV Push <User Schedule>  lipid, fat emulsion (Fish Oil and Plant Based) 20% Infusion 0.495 Gm/kG/Day (20.83 mL/Hr) IV Continuous <Continuous>  metoprolol tartrate Injectable 5 milliGRAM(s) IV Push every 6 hours  pantoprazole  Injectable 40 milliGRAM(s) IV Push daily  Parenteral Nutrition - Adult 1 Each (83 mL/Hr) TPN Continuous <Continuous>    MEDICATIONS  (PRN):  dextrose Oral Gel 15 Gram(s) Oral once PRN Blood Glucose LESS THAN 70 milliGRAM(s)/deciliter  HYDROmorphone  Injectable 0.5 milliGRAM(s) IV Push every 4 hours PRN Severe Pain (7 - 10)      Vital Signs Last 24 Hrs  T(C): 36.5 (08 Jul 2023 05:07), Max: 37.2 (07 Jul 2023 09:34)  T(F): 97.7 (08 Jul 2023 05:07), Max: 98.9 (07 Jul 2023 09:34)  HR: 61 (08 Jul 2023 08:00) (58 - 71)  BP: 164/74 (08 Jul 2023 08:00) (139/106 - 196/77)  BP(mean): 107 (08 Jul 2023 08:00) (95 - 142)  RR: 18 (08 Jul 2023 08:00) (16 - 28)  SpO2: 94% (08 Jul 2023 08:00) (91% - 98%)    Parameters below as of 08 Jul 2023 08:00  Patient On (Oxygen Delivery Method): room air        Physical Exam:  General: NAD, resting comfortably in bed  Pulmonary: Nonlabored breathing, no respiratory distress  Cardiovascular: NSR  Abdominal: soft, NT/ND  Extremities: WWP, normal strength  Neuro: A/O x 3, CNs II-XII grossly intact, no focal deficits    I&O's Summary    07 Jul 2023 07:01  -  08 Jul 2023 07:00  --------------------------------------------------------  IN: 4426.4 mL / OUT: 2810 mL / NET: 1616.4 mL    08 Jul 2023 07:01  -  08 Jul 2023 08:59  --------------------------------------------------------  IN: 99.7 mL / OUT: 0 mL / NET: 99.7 mL        LABS:                        10.0   16.84 )-----------( 450      ( 08 Jul 2023 05:10 )             31.0     07-08    135  |  103  |  29<H>  ----------------------------<  123<H>  4.1   |  25  |  0.95    Ca    6.9<L>      08 Jul 2023 05:10  Phos  2.9     07-08  Mg     2.2     07-08    TPro  5.6<L>  /  Alb  1.9<L>  /  TBili  2.5<H>  /  DBili  2.0<H>  /  AST  103<H>  /  ALT  61<H>  /  AlkPhos  80  07-08      Urinalysis Basic - ( 08 Jul 2023 05:10 )    Color: x / Appearance: x / SG: x / pH: x  Gluc: 123 mg/dL / Ketone: x  / Bili: x / Urobili: x   Blood: x / Protein: x / Nitrite: x   Leuk Esterase: x / RBC: x / WBC x   Sq Epi: x / Non Sq Epi: x / Bacteria: x      CAPILLARY BLOOD GLUCOSE      POCT Blood Glucose.: 121 mg/dL (08 Jul 2023 06:30)  POCT Blood Glucose.: 95 mg/dL (07 Jul 2023 23:31)  POCT Blood Glucose.: 130 mg/dL (07 Jul 2023 19:49)  POCT Blood Glucose.: 69 mg/dL (07 Jul 2023 19:23)  POCT Blood Glucose.: 63 mg/dL (07 Jul 2023 19:22)  POCT Blood Glucose.: 117 mg/dL (07 Jul 2023 12:24)    LIVER FUNCTIONS - ( 08 Jul 2023 05:10 )  Alb: 1.9 g/dL / Pro: 5.6 g/dL / ALK PHOS: 80 U/L / ALT: 61 U/L / AST: 103 U/L / GGT: x             RADIOLOGY & ADDITIONAL STUDIES:   SUBJECTIVE:  Pt seen on rounds this AM. Performed wound dressing, wound looks clean and edges look well perfused.     MEDICATIONS  (STANDING):  acetaminophen   IVPB .. 1000 milliGRAM(s) IV Intermittent once  acetaminophen   IVPB .. 1000 milliGRAM(s) IV Intermittent once  aMIOdarone Infusion 0.501 mG/Min (16.7 mL/Hr) IV Continuous <Continuous>  chlorhexidine 2% Cloths 1 Application(s) Topical <User Schedule>  dextrose 5%. 1000 milliLiter(s) (50 mL/Hr) IV Continuous <Continuous>  dextrose 5%. 1000 milliLiter(s) (100 mL/Hr) IV Continuous <Continuous>  dextrose 50% Injectable 25 Gram(s) IV Push once  dextrose 50% Injectable 12.5 Gram(s) IV Push once  dextrose 50% Injectable 25 Gram(s) IV Push once  fluconAZOLE IVPB 400 milliGRAM(s) IV Intermittent every 24 hours  glucagon  Injectable 1 milliGRAM(s) IntraMuscular once  heparin   Injectable 5000 Unit(s) SubCutaneous every 8 hours  hydrALAZINE Injectable 10 milliGRAM(s) IV Push every 6 hours  imipenem/cilastatin  IVPB 1000 milliGRAM(s) IV Intermittent every 8 hours  insulin lispro (ADMELOG) corrective regimen sliding scale   SubCutaneous every 6 hours  levothyroxine Injectable 40 MICROGram(s) IV Push <User Schedule>  lipid, fat emulsion (Fish Oil and Plant Based) 20% Infusion 0.495 Gm/kG/Day (20.83 mL/Hr) IV Continuous <Continuous>  metoprolol tartrate Injectable 5 milliGRAM(s) IV Push every 6 hours  pantoprazole  Injectable 40 milliGRAM(s) IV Push daily  Parenteral Nutrition - Adult 1 Each (83 mL/Hr) TPN Continuous <Continuous>    MEDICATIONS  (PRN):  dextrose Oral Gel 15 Gram(s) Oral once PRN Blood Glucose LESS THAN 70 milliGRAM(s)/deciliter  HYDROmorphone  Injectable 0.5 milliGRAM(s) IV Push every 4 hours PRN Severe Pain (7 - 10)      Vital Signs Last 24 Hrs  T(C): 36.5 (08 Jul 2023 05:07), Max: 37.2 (07 Jul 2023 09:34)  T(F): 97.7 (08 Jul 2023 05:07), Max: 98.9 (07 Jul 2023 09:34)  HR: 61 (08 Jul 2023 08:00) (58 - 71)  BP: 164/74 (08 Jul 2023 08:00) (139/106 - 196/77)  BP(mean): 107 (08 Jul 2023 08:00) (95 - 142)  RR: 18 (08 Jul 2023 08:00) (16 - 28)  SpO2: 94% (08 Jul 2023 08:00) (91% - 98%)    Parameters below as of 08 Jul 2023 08:00  Patient On (Oxygen Delivery Method): room air        Physical Exam:  General: NAD, resting comfortably in bed  Pulmonary: Nonlabored breathing, no respiratory distress  Cardiovascular: NSR  Abdominal: soft, NT/ND, wound edges look well perfused, wound is clean  Extremities: WWP, normal strength  Neuro: A/O x 3, CNs II-XII grossly intact, no focal deficits    I&O's Summary    07 Jul 2023 07:01  -  08 Jul 2023 07:00  --------------------------------------------------------  IN: 4426.4 mL / OUT: 2810 mL / NET: 1616.4 mL    08 Jul 2023 07:01  -  08 Jul 2023 08:59  --------------------------------------------------------  IN: 99.7 mL / OUT: 0 mL / NET: 99.7 mL        LABS:                        10.0   16.84 )-----------( 450      ( 08 Jul 2023 05:10 )             31.0     07-08    135  |  103  |  29<H>  ----------------------------<  123<H>  4.1   |  25  |  0.95    Ca    6.9<L>      08 Jul 2023 05:10  Phos  2.9     07-08  Mg     2.2     07-08    TPro  5.6<L>  /  Alb  1.9<L>  /  TBili  2.5<H>  /  DBili  2.0<H>  /  AST  103<H>  /  ALT  61<H>  /  AlkPhos  80  07-08      Urinalysis Basic - ( 08 Jul 2023 05:10 )    Color: x / Appearance: x / SG: x / pH: x  Gluc: 123 mg/dL / Ketone: x  / Bili: x / Urobili: x   Blood: x / Protein: x / Nitrite: x   Leuk Esterase: x / RBC: x / WBC x   Sq Epi: x / Non Sq Epi: x / Bacteria: x      CAPILLARY BLOOD GLUCOSE      POCT Blood Glucose.: 121 mg/dL (08 Jul 2023 06:30)  POCT Blood Glucose.: 95 mg/dL (07 Jul 2023 23:31)  POCT Blood Glucose.: 130 mg/dL (07 Jul 2023 19:49)  POCT Blood Glucose.: 69 mg/dL (07 Jul 2023 19:23)  POCT Blood Glucose.: 63 mg/dL (07 Jul 2023 19:22)  POCT Blood Glucose.: 117 mg/dL (07 Jul 2023 12:24)    LIVER FUNCTIONS - ( 08 Jul 2023 05:10 )  Alb: 1.9 g/dL / Pro: 5.6 g/dL / ALK PHOS: 80 U/L / ALT: 61 U/L / AST: 103 U/L / GGT: x             RADIOLOGY & ADDITIONAL STUDIES:

## 2023-07-08 NOTE — PROGRESS NOTE ADULT - ASSESSMENT
Patient seen 7/8/23. please see CRS and SICU notes for details. He is stable and no complaints. JOYCE's serosang. drain sutures intact. NGT light, ileostomy with just sweat. midline dressing C/D/I with intact clean vicryl mesh.     Plan is to continue current course.

## 2023-07-08 NOTE — PROGRESS NOTE ADULT - ASSESSMENT
79 yo male with CD, p/w SBO s/p ex-lap, SBR, TRE, then with anastomotic leak/feculent peritonitis s/p RTOR 7/5. Clinically improving. Advise continue imipenem and fluconazole thru 7/11/23 followed by observation off antibiotics.    Please reconsult with ?

## 2023-07-08 NOTE — PROGRESS NOTE ADULT - ATTENDING COMMENTS
CRS Attending Coverage for Dr Peterson  Seen and examined on morning rounds, RN at bedside  Patient without complaints, in good spirits  Denies pain  Afebrile  On amio gtt  Gen NAD  NGT bilious  Abdomen soft, ostomy dark with red sweat in appliance, catheter in place, dressing team performed by surgical team, Yahaira serosanguinous  Continue NGT  TPN  Dressing changes TID  On broad spectrum antibiotics  ICU care appreciated  Discussed with surgical housestaff

## 2023-07-08 NOTE — PROGRESS NOTE ADULT - ASSESSMENT
78M POD8 laparoscopic lysis of adhesions, converted to open lysis of adhesions, SBR x 3, temporary abdominal closure, POD8 ex lap, removal of abthera, ileocolic resection, small bowel anastomosis, POD1 RTOR exlap, washout, ileocolic resection with end ileostomy, blow hole colostomy, fistula, red rubber from ileostomy to small bowel anastomosis; vicryl bridging mesh; R JOYCE below ileostomy, L JOYCE at small bowel enterotomy repair. Off vent doing well. Clean wound.     Plan:     - TPN  - TID dressing change   - Poole removal   - Resume HSQ  - Monitor drain outputs   - Rest of care as per SICU team   - Team 1 will follow

## 2023-07-09 NOTE — PROGRESS NOTE ADULT - ASSESSMENT
78M w/ Crohn's, AFib/Flutter s/p DCCVs on amiodarone, remote ileocectomy and open appy here for SBO vs Crohns flare, s/p NGT decompression and now s/p lap TRE converted to open TRE, SBR x 3, left in discontinuity with abthera vac on 6/27, RTOR for ileocolic resection, small bowel anastomosis, and abdominal wall closure on 6/28, and s/p IR aspiration of perihepatic fluid on 7/3, stepped down to telemetery on 7/4. wound dehisence on 7/5, RTOR exlap, washout, ileocolic resection with end ileostomy, blow hole colostomy, red rubber from ileostomy to small bowel anastomosis; vicryl bridging mesh on 7/5. Transferred to SICU post op for HD monitoring. Extubated 7/6.    Neuro: Tylenol RTC. Dilaudid PRN. Hx anxiety: holding venlafaxine while NPO with NGT  CV: Keep MAP > 65. Hx of Afib/flutter: s/p DCCV, restarting amiodarone gtt while NPO. Restarted lorpessor  5q6, Hx of HLD: holding home rosuvastatin while NPO; TTE (04/22) - PASP 50mmHg, EF 69%.   Pulm: Extubated 7/6 - now on RA saturating well. s/p Chest tube by CT surg (6/27--7/3)   GI: TPN/lipids, wound dehisense -- RTOR on 7/5 for exlap, washout, ileocolic resection with end ileostomy, blow hole colostomy, red rubber from ileostomy to small bowel anastomosis; vicryl bridging mesh - continue TID dressing changes  : Cr stable. Voids.  ID: Perforated viscus, IR Cx 7/3: C. tertium, Lactobacillis. OR Cx 7/5 - rare yeast - Imipenem (6/30--), Fluconazole (6/30 -) OR Cx 6/28: Vanc-resistant/amp-sens (but allergic) enterococcus s/p CTX (6/26-6/30), Flagyl (6/26-6/30), Dapto (6/30-7/5).   Endo: ISS; Hx of hypothyroid: cont synthroid IV. Gout: holding home allopurinol while NPO.   PPx: SCD, HSQ.  Lines: PIV x 2, RUE PICC (6/30--), Victoria (7/5--) // DC: R Chest tube for iatrogenic PTX (6/27--7/3), R TLC (06/26-30). Victoria (6/26-30), New R TLC (7/5 - )  Wounds/Drains: midline incision; xeroform and WTD Kerlix, tid dressing changes; R JOYCE below ileostomy, L JOYCE at small bowel enterotomy repair  PT/OT: not ordered  Dispo: SICU

## 2023-07-09 NOTE — PROGRESS NOTE ADULT - SUBJECTIVE AND OBJECTIVE BOX
Awake and alert BP better controlled Afeb In RR Good BS ant Edema unchanged WBCs decreased creatinine and lytes stable

## 2023-07-09 NOTE — PROGRESS NOTE ADULT - SUBJECTIVE AND OBJECTIVE BOX
INTERVAL/OVERNIGHT EVENTS: 7/8: hydralazine q6h standing w/ PRN for breakthrough. Net Negative 300 @3pm. Hypertensive in afternoon. nifedipine 30 Dressing changed. ON: dc nifedipine- cannot be crushed. POCUS unchanged w/ apical B lines and small b/l pleural effusions. Lasix 20mg IV w/ transient improvement in SBP. Ostomy now w/ biliout output @ 2100. MN dressing change notable for bile staining L aspect of vicryl mesh, L JOYCE (@ small bowel anastomosis) now w/ some bile in tube (from serous). Started Cardene gtt @ 0300, dc hydralazine. c/o anxiety and insomnia, takes diazepam at home- 0.25 ordered.    SUBJECTIVE: Patient seen and examined bedside. Pt reports feeling well this morning with no acute complaints. Denies chest pain, SOB, or palpitations. Abdominal pain controlled. Denies nausea or vomiting.     aMIOdarone Infusion 0.501 mG/Min IV Continuous <Continuous>  fluconAZOLE IVPB 400 milliGRAM(s) IV Intermittent every 24 hours  heparin   Injectable 5000 Unit(s) SubCutaneous every 8 hours  hydrALAZINE Injectable 10 milliGRAM(s) IV Push every 6 hours  imipenem/cilastatin  IVPB 1000 milliGRAM(s) IV Intermittent every 8 hours  metoprolol tartrate Injectable 5 milliGRAM(s) IV Push every 6 hours  niCARdipine Infusion 5 mG/Hr IV Continuous <Continuous>      Vital Signs Last 24 Hrs  T(C): 36.6 (09 Jul 2023 09:00), Max: 37.1 (09 Jul 2023 00:20)  T(F): 97.8 (09 Jul 2023 09:00), Max: 98.8 (09 Jul 2023 00:20)  HR: 72 (09 Jul 2023 10:00) (58 - 74)  BP: 157/70 (09 Jul 2023 10:00) (123/60 - 205/85)  BP(mean): 100 (09 Jul 2023 10:00) (84 - 133)  RR: 27 (09 Jul 2023 10:00) (18 - 37)  SpO2: 92% (09 Jul 2023 10:00) (90% - 96%)    Parameters below as of 09 Jul 2023 10:00  Patient On (Oxygen Delivery Method): nasal cannula w/ humidification  O2 Flow (L/min): 4    I&O's Detail    08 Jul 2023 07:01  -  09 Jul 2023 07:00  --------------------------------------------------------  IN:    Amiodarone: 400.8 mL    Fat Emulsion (Fish Oil &amp; Plant Based) 20% Infusion: 241.2 mL    IV PiggyBack: 200 mL    IV PiggyBack: 600 mL    IV PiggyBack: 200 mL    NiCARdipine: 100 mL    TPN (Total Parenteral Nutrition): 1992 mL  Total IN: 3734 mL    OUT:    Bulb (mL): 60 mL    Bulb (mL): 10 mL    Fat Emulsion (Fish Oil &amp; Plant Based) 20% Infusion: 0 mL    Ileostomy (mL): 275 mL    Nasogastric/Oral tube (mL): 950 mL    Voided (mL): 2640 mL  Total OUT: 3935 mL    Total NET: -201 mL      09 Jul 2023 07:01  -  09 Jul 2023 10:07  --------------------------------------------------------  IN:    Amiodarone: 50.1 mL    Fat Emulsion (Fish Oil &amp; Plant Based) 20% Infusion: 20.1 mL    NiCARdipine: 40 mL    TPN (Total Parenteral Nutrition): 249 mL  Total IN: 359.2 mL    OUT:    Bulb (mL): 0 mL    Bulb (mL): 70 mL    Ileostomy (mL): 0 mL    Nasogastric/Oral tube (mL): 100 mL    Voided (mL): 500 mL  Total OUT: 670 mL    Total NET: -310.8 mL          General: NAD, resting comfortably in bed  Neuro: AAOx3  C/V: NSR  Pulm: Nonlabored breathing, no respiratory distress, lungs CTAB  Abd: soft, mildly distended, mildly distended, midline wound with vicryl mesh in place and bilious fluid in LUQ, packed WTD, ileostomy PPP with small amount of output  Extrem: WWP, edematous, SCDs in place        LABS:                        9.5    13.29 )-----------( 506      ( 09 Jul 2023 05:30 )             29.4     07-09    136  |  103  |  26<H>  ----------------------------<  118<H>  4.0   |  26  |  0.98    Ca    7.1<L>      09 Jul 2023 05:30  Phos  2.6     07-09  Mg     2.2     07-09    TPro  6.0  /  Alb  2.0<L>  /  TBili  2.4<H>  /  DBili  x   /  AST  127<H>  /  ALT  65<H>  /  AlkPhos  89  07-09      Urinalysis Basic - ( 09 Jul 2023 05:30 )    Color: x / Appearance: x / SG: x / pH: x  Gluc: 118 mg/dL / Ketone: x  / Bili: x / Urobili: x   Blood: x / Protein: x / Nitrite: x   Leuk Esterase: x / RBC: x / WBC x   Sq Epi: x / Non Sq Epi: x / Bacteria: x        RADIOLOGY & ADDITIONAL STUDIES:

## 2023-07-09 NOTE — PROGRESS NOTE ADULT - SUBJECTIVE AND OBJECTIVE BOX
Patient seen 7/9/23. See SICU and CRS notes for details of objective data.  He has no complaints. Denies pain. reports he is able to tolerate dressing changes well.   Since last seen, Patient's left sided JOYCE and left side of wound have bilious output.   His right sided ileostomy has also begun to have bilious output since yesterday.     The midline wound vicryl at the level of the incision appears to be intact. on the upper left side of the wound there is bile seepage coming under and then through the mesh. At this point the involved bowel is not present at the level of the wound.

## 2023-07-09 NOTE — PROGRESS NOTE ADULT - SUBJECTIVE AND OBJECTIVE BOX
POST-OP DAY: X s/p      SUBJECTIVE: Patient seen and examined bedside by Surgical resident.    aMIOdarone Infusion 0.501 mG/Min IV Continuous <Continuous>  fluconAZOLE IVPB 400 milliGRAM(s) IV Intermittent every 24 hours  heparin   Injectable 5000 Unit(s) SubCutaneous every 8 hours  imipenem/cilastatin  IVPB 1000 milliGRAM(s) IV Intermittent every 8 hours  metoprolol tartrate Injectable 5 milliGRAM(s) IV Push every 6 hours  niCARdipine Infusion 5 mG/Hr IV Continuous <Continuous>    MEDICATIONS  (PRN):  dextrose Oral Gel 15 Gram(s) Oral once PRN Blood Glucose LESS THAN 70 milliGRAM(s)/deciliter  HYDROmorphone  Injectable 0.5 milliGRAM(s) IV Push every 4 hours PRN Severe Pain (7 - 10)      I&O's Detail    08 Jul 2023 07:01  -  09 Jul 2023 07:00  --------------------------------------------------------  IN:    Amiodarone: 400.8 mL    Fat Emulsion (Fish Oil &amp; Plant Based) 20% Infusion: 241.2 mL    IV PiggyBack: 200 mL    IV PiggyBack: 600 mL    IV PiggyBack: 200 mL    NiCARdipine: 100 mL    TPN (Total Parenteral Nutrition): 1992 mL  Total IN: 3734 mL    OUT:    Bulb (mL): 60 mL    Bulb (mL): 10 mL    Fat Emulsion (Fish Oil &amp; Plant Based) 20% Infusion: 0 mL    Ileostomy (mL): 275 mL    Nasogastric/Oral tube (mL): 950 mL    Voided (mL): 2640 mL  Total OUT: 3935 mL    Total NET: -201 mL      09 Jul 2023 07:01  -  09 Jul 2023 08:06  --------------------------------------------------------  IN:    Amiodarone: 16.7 mL    Fat Emulsion (Fish Oil &amp; Plant Based) 20% Infusion: 20.1 mL    NiCARdipine: 12.5 mL    TPN (Total Parenteral Nutrition): 83 mL  Total IN: 132.3 mL    OUT:  Total OUT: 0 mL    Total NET: 132.3 mL          Vital Signs Last 24 Hrs  T(C): 36.4 (09 Jul 2023 06:02), Max: 37.1 (09 Jul 2023 00:20)  T(F): 97.6 (09 Jul 2023 06:02), Max: 98.8 (09 Jul 2023 00:20)  HR: 65 (09 Jul 2023 08:00) (58 - 74)  BP: 157/70 (09 Jul 2023 08:00) (123/60 - 205/85)  BP(mean): 101 (09 Jul 2023 08:00) (84 - 139)  RR: 22 (09 Jul 2023 08:00) (18 - 37)  SpO2: 92% (09 Jul 2023 08:00) (90% - 96%)    Parameters below as of 09 Jul 2023 08:00  Patient On (Oxygen Delivery Method): nasal cannula  O2 Flow (L/min): 4      General: NAD, resting comfortably in bed  C/V: NSR  Pulm: Nonlabored breathing, no respiratory distress  Abd: soft, NT/ND  Extrem: WWP, no edema, SCDs in place    LABS:                        9.5    13.29 )-----------( 506      ( 09 Jul 2023 05:30 )             29.4     07-09    136  |  103  |  26<H>  ----------------------------<  118<H>  4.0   |  26  |  0.98    Ca    7.1<L>      09 Jul 2023 05:30  Phos  2.6     07-09  Mg     2.2     07-09    TPro  6.0  /  Alb  2.0<L>  /  TBili  2.4<H>  /  DBili  x   /  AST  127<H>  /  ALT  65<H>  /  AlkPhos  89  07-09      Urinalysis Basic - ( 09 Jul 2023 05:30 )    Color: x / Appearance: x / SG: x / pH: x  Gluc: 118 mg/dL / Ketone: x  / Bili: x / Urobili: x   Blood: x / Protein: x / Nitrite: x   Leuk Esterase: x / RBC: x / WBC x   Sq Epi: x / Non Sq Epi: x / Bacteria: x        RADIOLOGY & ADDITIONAL STUDIES:     SUBJECTIVE: Patient seen and examined bedside by Surgical resident. Resting comfortably, had dressing changed this AM, noted to have Bile draining.    aMIOdarone Infusion 0.501 mG/Min IV Continuous <Continuous>  fluconAZOLE IVPB 400 milliGRAM(s) IV Intermittent every 24 hours  heparin   Injectable 5000 Unit(s) SubCutaneous every 8 hours  imipenem/cilastatin  IVPB 1000 milliGRAM(s) IV Intermittent every 8 hours  metoprolol tartrate Injectable 5 milliGRAM(s) IV Push every 6 hours  niCARdipine Infusion 5 mG/Hr IV Continuous <Continuous>    MEDICATIONS  (PRN):  dextrose Oral Gel 15 Gram(s) Oral once PRN Blood Glucose LESS THAN 70 milliGRAM(s)/deciliter  HYDROmorphone  Injectable 0.5 milliGRAM(s) IV Push every 4 hours PRN Severe Pain (7 - 10)      I&O's Detail    08 Jul 2023 07:01  -  09 Jul 2023 07:00  --------------------------------------------------------  IN:    Amiodarone: 400.8 mL    Fat Emulsion (Fish Oil &amp; Plant Based) 20% Infusion: 241.2 mL    IV PiggyBack: 200 mL    IV PiggyBack: 600 mL    IV PiggyBack: 200 mL    NiCARdipine: 100 mL    TPN (Total Parenteral Nutrition): 1992 mL  Total IN: 3734 mL    OUT:    Bulb (mL): 60 mL    Bulb (mL): 10 mL    Fat Emulsion (Fish Oil &amp; Plant Based) 20% Infusion: 0 mL    Ileostomy (mL): 275 mL    Nasogastric/Oral tube (mL): 950 mL    Voided (mL): 2640 mL  Total OUT: 3935 mL    Total NET: -201 mL      09 Jul 2023 07:01  -  09 Jul 2023 08:06  --------------------------------------------------------  IN:    Amiodarone: 16.7 mL    Fat Emulsion (Fish Oil &amp; Plant Based) 20% Infusion: 20.1 mL    NiCARdipine: 12.5 mL    TPN (Total Parenteral Nutrition): 83 mL  Total IN: 132.3 mL    OUT:  Total OUT: 0 mL    Total NET: 132.3 mL          Vital Signs Last 24 Hrs  T(C): 36.4 (09 Jul 2023 06:02), Max: 37.1 (09 Jul 2023 00:20)  T(F): 97.6 (09 Jul 2023 06:02), Max: 98.8 (09 Jul 2023 00:20)  HR: 65 (09 Jul 2023 08:00) (58 - 74)  BP: 157/70 (09 Jul 2023 08:00) (123/60 - 205/85)  BP(mean): 101 (09 Jul 2023 08:00) (84 - 139)  RR: 22 (09 Jul 2023 08:00) (18 - 37)  SpO2: 92% (09 Jul 2023 08:00) (90% - 96%)    Parameters below as of 09 Jul 2023 08:00  Patient On (Oxygen Delivery Method): nasal cannula  O2 Flow (L/min): 4      General: NAD, resting comfortably in bed  C/V: NSR  Pulm: Nonlabored breathing, no respiratory distress  Abd: soft, NT/ND, wound looks well perfused, noted bile drainage  Extrem: WWP, no edema    LABS:                        9.5    13.29 )-----------( 506      ( 09 Jul 2023 05:30 )             29.4     07-09    136  |  103  |  26<H>  ----------------------------<  118<H>  4.0   |  26  |  0.98    Ca    7.1<L>      09 Jul 2023 05:30  Phos  2.6     07-09  Mg     2.2     07-09    TPro  6.0  /  Alb  2.0<L>  /  TBili  2.4<H>  /  DBili  x   /  AST  127<H>  /  ALT  65<H>  /  AlkPhos  89  07-09      Urinalysis Basic - ( 09 Jul 2023 05:30 )    Color: x / Appearance: x / SG: x / pH: x  Gluc: 118 mg/dL / Ketone: x  / Bili: x / Urobili: x   Blood: x / Protein: x / Nitrite: x   Leuk Esterase: x / RBC: x / WBC x   Sq Epi: x / Non Sq Epi: x / Bacteria: x        RADIOLOGY & ADDITIONAL STUDIES:

## 2023-07-09 NOTE — PROGRESS NOTE ADULT - ASSESSMENT
A/P    He is now having bowel function out of the ileostomy, however also with e/o small bowel leak on left side of abdomen. Currently he is not showing systemic signs of worsening.    The increased/newly bilious ostomy output indicates no distal obstruction.    Currently no specific open area is present on the left side of midline wound that would allow for a drain/wick to be placed there to better control the wound leakage.  we will increase frequency of midline wound dressing changes to decrease effect of the bile on the wound tissues.

## 2023-07-09 NOTE — PROGRESS NOTE ADULT - ATTENDING COMMENTS
[employer]S Attending Coverage for Dr Peterson  Left sided JOYCE noted to have bilious output over night. Bile noted on superior aspect of midline incision as well.  Ostomy is also producing bilious output.  NGT draining bilious fluid.  Patient denies pain.  Dressing changed with housestakatya on AM rounds - Left sided JOYCE with bilious output (10mL recorded), left superior aspect of mesh in midline wound noted to have bile staining, remaining portions of abdominal wound and mesh visualized are clean, pink with granulation, no discharge or drainage - dressings replaced, right sided ostomy with bilious output (275ml recorded)  Discussed at length with Dr Peterson   - continue close monitoring and supportive care  - dressing changes TID, monitor JOYCE, NGT, ostomy outputs  - continue broadspectrum antibiotics  - continue TPN  - ICU care appreciated   Seen at bedside with surgical housestakatya

## 2023-07-09 NOTE — PROGRESS NOTE ADULT - SUBJECTIVE AND OBJECTIVE BOX
Pt seen and examined   denies pain  NGT output less and clear green  ileostomy output = bile    REVIEW OF SYSTEMS:  Constitutional: No fever,   Cardiovascular: No chest pain, palpitations,   Gastrointestinal: No abdominal or epigastric pain. No nausea, vomiting  Skin: No itching, burning, rashes or lesions       MEDICATIONS:  MEDICATIONS  (STANDING):  albumin human 25% IVPB 50 milliLiter(s) IV Intermittent every 6 hours  aMIOdarone Infusion 0.501 mG/Min (16.7 mL/Hr) IV Continuous <Continuous>  chlorhexidine 2% Cloths 1 Application(s) Topical <User Schedule>  dextrose 5%. 1000 milliLiter(s) (100 mL/Hr) IV Continuous <Continuous>  dextrose 5%. 1000 milliLiter(s) (50 mL/Hr) IV Continuous <Continuous>  dextrose 50% Injectable 25 Gram(s) IV Push once  dextrose 50% Injectable 12.5 Gram(s) IV Push once  dextrose 50% Injectable 25 Gram(s) IV Push once  fluconAZOLE IVPB 400 milliGRAM(s) IV Intermittent every 24 hours  glucagon  Injectable 1 milliGRAM(s) IntraMuscular once  heparin   Injectable 5000 Unit(s) SubCutaneous every 8 hours  hydrALAZINE Injectable 10 milliGRAM(s) IV Push every 6 hours  imipenem/cilastatin  IVPB 1000 milliGRAM(s) IV Intermittent every 8 hours  insulin lispro (ADMELOG) corrective regimen sliding scale   SubCutaneous every 6 hours  levothyroxine Injectable 40 MICROGram(s) IV Push <User Schedule>  lipid, fat emulsion (Fish Oil and Plant Based) 20% Infusion 0.5 Gm/kG/Day (20.83 mL/Hr) IV Continuous <Continuous>  metoprolol tartrate Injectable 5 milliGRAM(s) IV Push every 6 hours  niCARdipine Infusion 5 mG/Hr (25 mL/Hr) IV Continuous <Continuous>  pantoprazole  Injectable 40 milliGRAM(s) IV Push daily  Parenteral Nutrition - Adult 1 Each (83 mL/Hr) TPN Continuous <Continuous>  Parenteral Nutrition - Adult 1 Each (83 mL/Hr) TPN Continuous <Continuous>  potassium phosphate IVPB 15 milliMole(s) IV Intermittent once    MEDICATIONS  (PRN):  dextrose Oral Gel 15 Gram(s) Oral once PRN Blood Glucose LESS THAN 70 milliGRAM(s)/deciliter  HYDROmorphone  Injectable 0.5 milliGRAM(s) IV Push every 4 hours PRN Severe Pain (7 - 10)      Allergies    penicillin (Angioedema)    Intolerances        Vital Signs Last 24 Hrs  T(C): 36.6 (09 Jul 2023 12:00), Max: 37.1 (09 Jul 2023 00:20)  T(F): 97.8 (09 Jul 2023 12:00), Max: 98.8 (09 Jul 2023 00:20)  HR: 70 (09 Jul 2023 12:00) (58 - 74)  BP: 155/69 (09 Jul 2023 12:00) (123/60 - 205/85)  BP(mean): 99 (09 Jul 2023 12:00) (84 - 133)  RR: 24 (09 Jul 2023 12:00) (18 - 37)  SpO2: 94% (09 Jul 2023 12:00) (90% - 96%)    Parameters below as of 09 Jul 2023 12:00  Patient On (Oxygen Delivery Method): nasal cannula w/ humidification  O2 Flow (L/min): 4      07-08 @ 07:01  -  07-09 @ 07:00  --------------------------------------------------------  IN: 3734 mL / OUT: 3935 mL / NET: -201 mL    07-09 @ 07:01  -  07-09 @ 13:29  --------------------------------------------------------  IN: 483.9 mL / OUT: 1670 mL / NET: -1186.1 mL        PHYSICAL EXAM:    General:  in no acute distress  HEENT: MMM, conjunctiva and sclera clear  Gastrointestinal: Soft non-tender non-distended; Normal bowel sounds;  ostomy output bile  Skin: Warm and dry. No obvious rash    LABS:      CBC Full  -  ( 09 Jul 2023 05:30 )  WBC Count : 13.29 K/uL  RBC Count : 3.15 M/uL  Hemoglobin : 9.5 g/dL  Hematocrit : 29.4 %  Platelet Count - Automated : 506 K/uL  Mean Cell Volume : 93.3 fl  Mean Cell Hemoglobin : 30.2 pg  Mean Cell Hemoglobin Concentration : 32.3 gm/dL  Auto Neutrophil # : x  Auto Lymphocyte # : x  Auto Monocyte # : x  Auto Eosinophil # : x  Auto Basophil # : x  Auto Neutrophil % : x  Auto Lymphocyte % : x  Auto Monocyte % : x  Auto Eosinophil % : x  Auto Basophil % : x    07-09    136  |  103  |  26<H>  ----------------------------<  118<H>  4.0   |  26  |  0.98    Ca    7.1<L>      09 Jul 2023 05:30  Phos  2.6     07-09  Mg     2.2     07-09    TPro  6.0  /  Alb  2.0<L>  /  TBili  2.4<H>  /  DBili  x   /  AST  127<H>  /  ALT  65<H>  /  AlkPhos  89  07-09          Urinalysis Basic - ( 09 Jul 2023 05:30 )    Color: x / Appearance: x / SG: x / pH: x  Gluc: 118 mg/dL / Ketone: x  / Bili: x / Urobili: x   Blood: x / Protein: x / Nitrite: x   Leuk Esterase: x / RBC: x / WBC x   Sq Epi: x / Non Sq Epi: x / Bacteria:     Surgical Pathology Report (06.28.23 @ 09:15)    Surgical Pathology Report:   ACCESSION No:  [id number]  Patient:   LARRY KELLY      Accession:                             [id number]    Collected Date/Time:                   6/28/2023 09:15 EDT  Received Date/Time:                    6/28/2023 16:00 EDT    Surgical Pathology Report - Auth (Verified)    Specimen(s) Submitted  1  Part of small bowel, ileum  2  Ileocolic    Final Diagnosis  1.  Small intestinal resection:  -   Small intestine with generalized transmural moderately active  enteritis with serosal abscess and organizing acute serositis    -   Focal mucosal transmural necrosis with serosal abscess at prior  stitch site  -   Negative for dysplasia    2.  Ileocolic:  -   Segment of ileum and colon showing serosal abscess with  organizing acute serositis more pronounced in the ileal segment  -   1 unremarkable lymph node (0/1).  -   Negative for dysplasia  Verified by: Melissa Robertson MD  (Electronic Signature)  Reported on: 07/06/23 13:19 EDT, [facility], [address] [place] [address]  Phone: [phone]   Fax: [phone]  _________________________________________________________________      Clinical Information  Crohns disease    Gross Description  1.  Received: Fresh labeled  "part of small bowel intestine ileum"  ,  consisting of an unoriented segment of small bowel with two stapled ends  Integrity:  Intact  Orientation:  None provided      Small Bowel  Length:  57.5 cmCircumference:  5.0-7.0 cm  Wall Thickness:  0.2 cm  Serosa:  Tan-brown, predominantlysmooth, with focal shaggy areas of  adhesion  Mesentery:  1.5 cm, yellow, lobulated, homogenous  Mucosa:  Tan with normal folds; there are three stitched areas measuring  3.0 x 0.3 cm, 1.5 x 0.2 cm and 2.5 x 0.3 cm, 10.0 cm and 18.5 cm, 8.0 cm  from the nearest stapled margin  Additional Comments:   Also received in the container is an irregular  segment of previously opened small bowel, with multiple staple lines.  The serosa is red-brown, smooth to shaggy. The mucosa is tan with normal  folds. No distinctive masses or lesions are grossly identified. The wall  thickness averages 0.2 cm.  Submitted:  Representative sections in 10 cassettes:  1A: Shaved stapled margin  1B: Opposite shaved stapled margin  1C-1H: Sequential sections of small bowel submitted from one end to the  opposite end (1D, 1F, 1H -stitched foci)  1I: Second segment of small bowel, shaved stapled margins  1J: Second segment small bowel, full-thickness    2.            In formalin labeled  Received:                      "ileocolic"  Size:  One fragment measuring 8.0 x 4.0 x 2.5 cm collectively  Description:  An irregular portion of presumed small bowel measuring 2.9  cm in length by 5.0 cm in internal circumference, diffusely stapled  to an irregular portion of presumed colon measuring 3.5 cm in length  by 3.0 cm in diameter. The small bowel and the colon are continuously  stapled through one staple line. A distinctive anastomosis is not grossly  identified. The serosa is red-brown, smooth to shaggy. The mucosa is  tan-brown with normal folds. No distinctive masses or lesions are grossly  identified. The wall thickness averages 0.2 cm.  Submitted:  Representative sections in 4 cassettes:  2A: Shaved stapled margin, small bowel  2B: Full-thickness presumed small bowel  2C: Shaved stapled margin, colon  2D: Full-thickness presumed colon    JOSEPH Chamorro([facility]) 07/05/2023 10:08 AM    Disclaimer  In addition to other data that may appear on the specimen containers, all  labels have been inspected to confirm the presence of the patient's name  and date of birth.    Histological Processing Performed at [facility], Department of  Pathology, [facility] [address] [place] [address].          RADIOLOGY & ADDITIONAL STUDIES (The following images were personally reviewed):

## 2023-07-10 NOTE — ADVANCED PRACTICE NURSE CONSULT - ASSESSMENT
Pt sitting up in chair with spouse present. Education provided on frequency of emptying and changing appliance. Ostomy appliance appears to be leaking so new appliance placed with patient and spouse observing. Stoma still slightly dusky, producing dark green effluent, measures approx 1 1/2", flush to abdomen. New 1 3/4" [place] 2 piece appliance used, extra supplies left at bedside for discharge.

## 2023-07-10 NOTE — PROGRESS NOTE ADULT - ASSESSMENT
78M w/ Crohn's, AFib/Flutter s/p DCCVs on amiodarone, remote ileocectomy and open appy here for SBO vs Crohns flare, s/p NGT decompression and now s/p lap TRE converted to open TRE, SBR x 3, left in discontinuity with abthera vac on 6/27, RTOR for ileocolic resection, small bowel anastomosis, and abdominal wall closure on 6/28, and s/p IR aspiration of perihepatic fluid on 7/3, stepped down to telemetery on 7/4. wound dehisence on 7/5, RTOR exlap, washout, ileocolic resection with end ileostomy, blow hole colostomy, red rubber from ileostomy to small bowel anastomosis; vicryl bridging mesh on 7/5. Transferred to SICU post op for HD monitoring. Extubated 7/6. Bilious drainage on wound noted. On TPN, albumin.     - Continue antibiotics   - TID dressing change   - Monitor drain outputs   - Team 1 will follow    78M w/ Crohn's, AFib/Flutter s/p DCCVs on amiodarone, remote ileocectomy and open appy here for SBO vs Crohns flare, s/p NGT decompression and now s/p lap TRE converted to open TRE, SBR x 3, left in discontinuity with abthera vac on 6/27, RTOR for ileocolic resection, small bowel anastomosis, and abdominal wall closure on 6/28, and s/p IR aspiration of perihepatic fluid on 7/3, stepped down to telemetery on 7/4. wound dehisence on 7/5, RTOR exlap, washout, ileocolic resection with end ileostomy, blow hole colostomy, red rubber from ileostomy to small bowel anastomosis; vicryl bridging mesh on 7/5. Transferred to SICU post op for HD monitoring. Extubated 7/6. Bilious drainage on wound noted. On TPN, albumin.     - Remove NG tube and red rubber  - Start Ocreotide   - TID dressing change   - Monitor drain outputs   - Team 1 will follow

## 2023-07-10 NOTE — PROGRESS NOTE ADULT - ASSESSMENT
78M w/ Crohn's, AFib/Flutter s/p DCCVs on amiodarone, remote ileocectomy and open appy here for SBO vs Crohns flare, s/p NGT decompression and now s/p lap TRE converted to open TRE, SBR x 3, left in discontinuity with abthera vac on 6/27, RTOR for ileocolic resection, small bowel anastomosis, and abdominal wall closure on 6/28, and s/p IR aspiration of perihepatic fluid on 7/3, stepped down to telemetery on 7/4. wound dehisence on 7/5, RTOR exlap, washout, ileocolic resection with end ileostomy, blow hole colostomy, red rubber from ileostomy to small bowel anastomosis; vicryl bridging mesh on 7/5. Transferred to SICU post op for HD monitoring. Extubated 7/6.    Neuro: Tylenol RTC. Dilaudid PRN. Hx anxiety: holding venlafaxine while NPO with NGT  CV: Keep MAP > 65. Hx of Afib/flutter: s/p DCCV, Amiodarone gtt while NPO. Lorpessor  5q6, Hx of HLD: holding home rosuvastatin while NPO; TTE (04/22) - PASP 50mmHg, EF 69%. Albumin 25%q6. Continue hydralazine 10 q6 PRN and cardene gtt to control HTN. Continue diuresis today.  Pulm: Extubated 7/6 - now on RA saturating well. s/p Chest tube by CT surg (6/27--7/3)   GI: TPN/lipids, wound dehisense -- RTOR on 7/5 for exlap, washout, ileocolic resection with end ileostomy, blow hole colostomy, red rubber from ileostomy to small bowel anastomosis; vicryl bridging mesh - continue TID dressing changes  : Cr stable. Voids.   ID: Perforated viscus, IR Cx 7/3: C. tertium, Lactobacillis. OR Cx 7/5 - rare yeast - Imipenem (6/30--), Fluconazole (6/30 -) OR Cx 6/28: Vanc-resistant/amp-sens (but allergic) enterococcus s/p CTX (6/26-6/30), Flagyl (6/26-6/30), Dapto (6/30-7/5).   Endo: ISS; Hx of hypothyroid: cont synthroid IV. Gout: holding home allopurinol while NPO.   PPx: SCD, HSQ.  Lines: PIV x 2, RUE PICC (6/30--), Victoria (7/8--) // DC: R Chest tube for iatrogenic PTX (6/27--7/3), R TLC (06/26-30). New R TLC (7/5 - )  Wounds/Drains: midline incision; xeroform and WTD Kerlix, tid dressing changes; R JOYCE below ileostomy, L JOYCE at small bowel enterotomy repair  PT/OT: not ordered  Dispo: SICU     78M w/ Crohn's, AFib/Flutter s/p DCCVs on amiodarone, remote ileocectomy and open appy here for SBO vs Crohns flare, s/p NGT decompression and now s/p lap TRE converted to open TRE, SBR x 3, left in discontinuity with abthera vac on 6/27, RTOR for ileocolic resection, small bowel anastomosis, and abdominal wall closure on 6/28, and s/p IR aspiration of perihepatic fluid on 7/3, stepped down to telemetery on 7/4. wound dehisence on 7/5, RTOR exlap, washout, ileocolic resection with end ileostomy, blow hole colostomy, red rubber from ileostomy to small bowel anastomosis; vicryl bridging mesh on 7/5. Transferred to SICU post op for HD monitoring. Extubated 7/6.    Neuro: Tylenol RTC. Dilaudid PRN. Hx anxiety: holding venlafaxine while NPO with NGT  CV: Keep MAP > 65. Hx of Afib/flutter: s/p DCCV, Amiodarone gtt while NPO. Lorpessor  5q6, Hx of HLD: holding home rosuvastatin while NPO; TTE (04/22) - PASP 50mmHg, EF 69%. Albumin 25%q6. Continue hydralazine 10 q6 PRN and cardene gtt to control HTN. Continue diuresis today.  Pulm: Extubated 7/6 - now on RA saturating well. s/p Chest tube by CT surg (6/27--7/3)   GI: TPN/lipids, wound dehisense -- RTOR on 7/5 for exlap, washout, ileocolic resection with end ileostomy, blow hole colostomy, red rubber from ileostomy to small bowel anastomosis; vicryl bridging mesh - continue TID dressing changes. D/C NGT and D/C ostomy red rubber today. Starting octreotide.   : Cr stable. Voids.   ID: Perforated viscus, IR Cx 7/3: C. tertium, Lactobacillis. OR Cx 7/5 - rare yeast - Imipenem (6/30--), Fluconazole (6/30 -) OR Cx 6/28: Vanc-resistant/amp-sens (but allergic) enterococcus s/p CTX (6/26-6/30), Flagyl (6/26-6/30), Dapto (6/30-7/5).   Endo: ISS; Hx of hypothyroid: cont synthroid IV. Gout: holding home allopurinol while NPO.   PPx: SCD, HSQ.  Lines: PIV x 2, RUE PICC (6/30--), Victoria (7/8--) // DC: R Chest tube for iatrogenic PTX (6/27--7/3), R TLC (06/26-30). New R TLC (7/5 - )  Wounds/Drains: midline incision; xeroform and WTD Kerlix, tid dressing changes; R JOYCE below ileostomy, L JOYCE at small bowel enterotomy repair  PT/OT: not ordered  Dispo: SICU

## 2023-07-10 NOTE — PROGRESS NOTE ADULT - SUBJECTIVE AND OBJECTIVE BOX
ON: dressing change with light green staining of bottom Kerlix, minimal of top/abd pads R JOYCE - ss, L JOYCE - bilious; IV Tyl x 1 for mild pain      SUBJECTIVE: Pt seen and examined at bedside this am by ICU team. Patient is lying comfortably in bed. Denies significant abdominal pain. Denies nausea, emesis.     MEDICATIONS  (STANDING):  albumin human 25% IVPB 50 milliLiter(s) IV Intermittent every 8 hours  aMIOdarone Infusion 0.501 mG/Min (16.7 mL/Hr) IV Continuous <Continuous>  chlorhexidine 2% Cloths 1 Application(s) Topical <User Schedule>  dextrose 5%. 1000 milliLiter(s) (50 mL/Hr) IV Continuous <Continuous>  dextrose 5%. 1000 milliLiter(s) (100 mL/Hr) IV Continuous <Continuous>  dextrose 50% Injectable 25 Gram(s) IV Push once  dextrose 50% Injectable 12.5 Gram(s) IV Push once  dextrose 50% Injectable 25 Gram(s) IV Push once  fluconAZOLE IVPB 400 milliGRAM(s) IV Intermittent every 24 hours  glucagon  Injectable 1 milliGRAM(s) IntraMuscular once  heparin   Injectable 5000 Unit(s) SubCutaneous every 8 hours  hydrALAZINE Injectable 10 milliGRAM(s) IV Push every 6 hours  imipenem/cilastatin  IVPB 1000 milliGRAM(s) IV Intermittent every 8 hours  insulin lispro (ADMELOG) corrective regimen sliding scale   SubCutaneous every 6 hours  levothyroxine Injectable 40 MICROGram(s) IV Push <User Schedule>  lipid, fat emulsion (Fish Oil and Plant Based) 20% Infusion 0.5 Gm/kG/Day (20.83 mL/Hr) IV Continuous <Continuous>  metoprolol tartrate Injectable 5 milliGRAM(s) IV Push every 6 hours  niCARdipine Infusion 5 mG/Hr (25 mL/Hr) IV Continuous <Continuous>  pantoprazole  Injectable 40 milliGRAM(s) IV Push daily  Parenteral Nutrition - Adult 1 Each (83 mL/Hr) TPN Continuous <Continuous>  potassium chloride  10 mEq/100 mL IVPB 10 milliEquivalent(s) IV Intermittent every 1 hour    MEDICATIONS  (PRN):  dextrose Oral Gel 15 Gram(s) Oral once PRN Blood Glucose LESS THAN 70 milliGRAM(s)/deciliter  HYDROmorphone  Injectable 0.5 milliGRAM(s) IV Push every 4 hours PRN Severe Pain (7 - 10)      ips: Cardene@6, Amiodarone @.5    ICU Vital Signs Last 24 Hrs  T(C): 36.8 (10 Jul 2023 05:20), Max: 36.8 (10 Jul 2023 05:20)  T(F): 98.2 (10 Jul 2023 05:20), Max: 98.2 (10 Jul 2023 05:20)  HR: 55 (10 Jul 2023 07:00) (55 - 72)  BP: 125/60 (10 Jul 2023 07:00) (125/60 - 166/83)  BP(mean): 87 (10 Jul 2023 07:00) (87 - 109)  RR: 20 (10 Jul 2023 07:00) (19 - 27)  SpO2: 93% (10 Jul 2023 07:00) (90% - 96%)    O2 Parameters below as of 10 Jul 2023 07:00  Patient On (Oxygen Delivery Method): nasal cannula w/ humidification  O2 Flow (L/min): 4          Physical Exam:  General: NAD  HEENT: NC/AT, EOMI, PERRLA, normal hearing, no oral lesions, neck supple w/o LAD, NGT in nares with minimal bilious output  Pulmonary: Tachypneic, equal chest wall expansion b/l, no respiratory distress, CTA B/L   Cardiovascular: NSR, no murmurs  Abdominal: soft, nondistended, mild tenderness to palpation near edges of midline incision. Vicryl mesh noted to be bile stained - kerlix and ABD dressing noted. JOYCE x2 - R SS, L bilious. Ostomy noted with minimal stool, bowel sweat and no gas  Extremities: WWP, 5/5 strength x 4, 2+ edema noted to level mid calf  Neuro: A/O x3, CNs II-XII grossly intact, normal motor/sensation, no focal deficits      I&O's Summary    09 Jul 2023 07:01  -  10 Jul 2023 07:00  --------------------------------------------------------  IN: 4452.1 mL / OUT: 3690 mL / NET: 762.1 mL        LABS:                        8.9    9.59  )-----------( 481      ( 10 Jul 2023 05:30 )             27.9     07-10    138  |  103  |  24<H>  ----------------------------<  114<H>  3.6   |  26  |  0.95    Ca    7.4<L>      10 Jul 2023 05:30  Phos  2.9     07-10  Mg     2.0     07-10    TPro  6.0  /  Alb  2.1<L>  /  TBili  2.6<H>  /  DBili  x   /  AST  139<H>  /  ALT  76<H>  /  AlkPhos  90  07-10      Urinalysis Basic - ( 10 Jul 2023 05:30 )    Color: x / Appearance: x / SG: x / pH: x  Gluc: 114 mg/dL / Ketone: x  / Bili: x / Urobili: x   Blood: x / Protein: x / Nitrite: x   Leuk Esterase: x / RBC: x / WBC x   Sq Epi: x / Non Sq Epi: x / Bacteria: x      CAPILLARY BLOOD GLUCOSE      POCT Blood Glucose.: 121 mg/dL (10 Jul 2023 05:39)  POCT Blood Glucose.: 122 mg/dL (09 Jul 2023 23:11)  POCT Blood Glucose.: 119 mg/dL (09 Jul 2023 18:44)  POCT Blood Glucose.: 121 mg/dL (09 Jul 2023 13:48)    LIVER FUNCTIONS - ( 10 Jul 2023 05:30 )  Alb: 2.1 g/dL / Pro: 6.0 g/dL / ALK PHOS: 90 U/L / ALT: 76 U/L / AST: 139 U/L / GGT: x             Cultures:    RADIOLOGY & ADDITIONAL STUDIES:

## 2023-07-10 NOTE — PROGRESS NOTE ADULT - ASSESSMENT
78M POD13 laparoscopic lysis of adhesions, converted to open lysis of adhesions, SBR x 3, temporary abdominal closure, POD12 ex lap, removal of abthera, ileocolic resection, small bowel anastomosis c/b anastomotic leak resulting in wound dehiscence now POD5 RTOR exlap, washout, ileocolic resection with end ileostomy, blow hole colostomy, fistula, red rubber from ileostomy to small bowel anastomosis; vicryl bridging mesh; R JOYCE below ileostomy, L JOYCE at small bowel enterotomy repair.    Plan:  - Continue dressing change TID  - Abdominal binder at all times  - Agree w/ dc NGT  - Agree w/ L JOYCE to suction  - Rest of care per SICU

## 2023-07-10 NOTE — CHART NOTE - NSCHARTNOTEFT_GEN_A_CORE
Admitting Diagnosis:   Patient is a 78y old  Male who presents with a chief complaint of sbo (10 Jul 2023 12:47)      PAST MEDICAL & SURGICAL HISTORY:  Essential hypertension  Hypertension      Atrial fibrillation  s/p cardioversion  and   Pt. reports 4 DCCV  Now on Amiodarone 200mg PO bid and Eliquis 5mg PO bid  Last DCCV 4 yrs ago at [facility]      Crohn's disease  s/p partial resection of ileum      Hyperlipidemia      Hypothyroidism      History of depression  On Venlafaxine ER 150mg PO bid      H/O knee surgery      History of cataract surgery          Current Nutrition Order: NPO; TPN 123g AA + 455g dextrose + 250ml lipid infusion    PO Intake: N/A    GI Issues: +BM in pouch s/p exlap, washout & ileocolic resection with end ileostomy     Pain: No pain/absence of nonverbal indicators of pain    Skin Integrity: open incision to abdomen, ileostomy in place, R & L abdomen JOYCE drains. skin tear R hand [1.0 x 0.5 cm]. 2+ edema to mid calf    Labs:   07-10    138  |  103  |  24<H>  ----------------------------<  114<H>  3.6   |  26  |  0.95    Ca    7.4<L>      10 Jul 2023 05:30  Phos  2.9     07-10  Mg     2.0     10    TPro  6.0  /  Alb  2.1<L>  /  TBili  2.6<H>  /  DBili  x   /  AST  139<H>  /  ALT  76<H>  /  AlkPhos  90  07-10    CAPILLARY BLOOD GLUCOSE      POCT Blood Glucose.: 121 mg/dL (10 Jul 2023 05:39)  POCT Blood Glucose.: 122 mg/dL (2023 23:11)  POCT Blood Glucose.: 119 mg/dL (2023 18:44)  POCT Blood Glucose.: 121 mg/dL (2023 13:48)      Medications:  MEDICATIONS  (STANDING):  albumin human 25% IVPB 50 milliLiter(s) IV Intermittent every 8 hours  aMIOdarone Infusion 0.501 mG/Min (16.7 mL/Hr) IV Continuous <Continuous>  chlorhexidine 2% Cloths 1 Application(s) Topical <User Schedule>  dextrose 5%. 1000 milliLiter(s) (100 mL/Hr) IV Continuous <Continuous>  dextrose 5%. 1000 milliLiter(s) (50 mL/Hr) IV Continuous <Continuous>  dextrose 50% Injectable 25 Gram(s) IV Push once  dextrose 50% Injectable 12.5 Gram(s) IV Push once  dextrose 50% Injectable 25 Gram(s) IV Push once  fluconAZOLE IVPB 400 milliGRAM(s) IV Intermittent every 24 hours  glucagon  Injectable 1 milliGRAM(s) IntraMuscular once  heparin   Injectable 5000 Unit(s) SubCutaneous every 8 hours  hydrALAZINE Injectable 10 milliGRAM(s) IV Push every 6 hours  imipenem/cilastatin  IVPB 1000 milliGRAM(s) IV Intermittent every 8 hours  insulin lispro (ADMELOG) corrective regimen sliding scale   SubCutaneous every 6 hours  labetalol Injectable 10 milliGRAM(s) IV Push every 6 hours  levothyroxine Injectable 40 MICROGram(s) IV Push <User Schedule>  lidocaine 0.5% Injectable 5 milliLiter(s) Local Injection once  lipid, fat emulsion (Fish Oil and Plant Based) 20% Infusion 0.5 Gm/kG/Day (20.83 mL/Hr) IV Continuous <Continuous>  niCARdipine Infusion 5 mG/Hr (25 mL/Hr) IV Continuous <Continuous>  octreotide  Injectable 100 MICROGram(s) IV Push every 8 hours  pantoprazole  Injectable 40 milliGRAM(s) IV Push daily  Parenteral Nutrition - Adult 1 Each (83 mL/Hr) TPN Continuous <Continuous>  Parenteral Nutrition - Adult 1 Each (83 mL/Hr) TPN Continuous <Continuous>    MEDICATIONS  (PRN):  dextrose Oral Gel 15 Gram(s) Oral once PRN Blood Glucose LESS THAN 70 milliGRAM(s)/deciliter  HYDROmorphone  Injectable 0.5 milliGRAM(s) IV Push every 4 hours PRN Severe Pain (7 - 10)      Weight: 101kg [2023]  Daily   99.9kg [2023]  Daily Weight in k.1 (10 Jul 2023 05:00)    Weight Change: Recommend daily weights for trending  IBW: 86.4kg/190lbs   % IBW: 118%    Estimated energy needs:   Ideal body weight (86.4kg) used for calculations as pt >100% of IBW, BMI <30 per [facility] Standards of Care. Needs estimated for age and adjusted for current clinical status, increased needs for post-op & open abd wound healing    Calories: 4981-0839 kcals based on 30-35 kcals/kg  Protein: 129.6-172.8g based on 1.5-2g protein/kg   *Fluid needs per team    Subjective:   78M w/ Crohn's, AFib/Flutter s/p DCCVs on amiodarone, remote ileocectomy and open appy here for SBO vs Crohn’s flare, s/p NGT decompression and now s/p lap TRE converted to open TRE, SBR x 3, left in discontinuity with abthera vac on , RTOR for ileocolic resection, small bowel anastomosis, and abdominal wall closure on , and s/p IR aspiration of perihepatic fluid on 7/3, stepped down to telemetery on . wound dehiscence on , RTOR  for exlap, washout.    Chart reviewed. Pt seen with IDT during AM rounds s/p wound dehisence on , RTOR exlap, washout, ileocolic resection with end ileostomy, blow hole colostomy, red rubber from ileostomy to small bowel anastomosis. Afebrile. HR WNL, BP WNL to low. MAPs trending >65 mmHg. On RA s/p extubation . NG Tube d/c, JOYCE drain to LIWS. PN remains primary means to nutrition, current provision provides 29.4 kcals/kg & 1.42g protein/kg IBW. GIF 3.09 mg/kg/min. Ostomy noted with minimal stool, +bile output. Pt denies n/v/abd pain. Labs reviewed- POC BG trending 119-122 mg/dL x 24hrs. TG 205H today. BUN 24H, Creat & GFR WNL. Phos 2.9- needs repletion. Nutrition related meds reviewed. Monitor feasibility of advancing diet to EN/PO diet. RDN will continue to monitor, reassess, and intervene as appropriate.       Previous Nutrition Diagnosis:  Increased Nutrient Needs R/T physiological demands for nutrient AEB post-op wound healing    Active [  X ]  Resolved [   ]    If resolved, new PES:     Goal:  Pt will meet at least 75% of protein & energy needs via most appropriate route for nutrition     Recommendations:  1. c/w TPN via central line  >> Recommend 450g dextrose + 130g AA + 250ml lipid infusion daily; provides 2550 kcals, GIR 3.06 mg/kg/min  - 29.5 kcals/kg & 1.50g protein/kg IBW  2. Hydration per team  - adjust free water prn [BUN high]  3. Monitor lytes & replenish outside TPN bag prn  4. Obtain weekly lipid panel  - hold if TG >400  5. Daily BMP/POC BG Q4-6hrs  6. Monitor for GI tract viability  - potential advancement to EN/PO diet  7. Monitor chemistry, skin integrity, GI function    RD to follow at high nutrition risk, reconsult prn.    Education: deferred     Risk Level: High [ X  ] Moderate [   ] Low [   ] Admitting Diagnosis:   Patient is a 78y old  Male who presents with a chief complaint of sbo (10 Jul 2023 12:47)      PAST MEDICAL & SURGICAL HISTORY:  Essential hypertension  Hypertension      Atrial fibrillation  s/p cardioversion  and   Pt. reports 4 DCCV  Now on Amiodarone 200mg PO bid and Eliquis 5mg PO bid  Last DCCV [facility] yrs ago at [facility]      Crohn's disease  s/p partial resection of ileum      Hyperlipidemia      Hypothyroidism      History of depression  On Venlafaxine ER 150mg PO bid      H/O knee surgery      History of cataract surgery          Current Nutrition Order: NPO; TPN 123g AA + 455g dextrose + 250ml lipid infusion    PO Intake: N/A    GI Issues: +BM in pouch s/p exlap, washout & ileocolic resection with end ileostomy     Pain: No pain/absence of nonverbal indicators of pain    Skin Integrity: open incision to abdomen, ileostomy in place, R & L abdomen JOYCE drains. skin tear R hand [1.0 x 0.5 cm]. 2+ edema to mid calf    Labs:   07-10    138  |  103  |  24<H>  ----------------------------<  114<H>  3.6   |  26  |  0.95    Ca    7.4<L>      10 Jul 2023 05:30  Phos  2.9     07-10  Mg     2.0     10    TPro  6.0  /  Alb  2.1<L>  /  TBili  2.6<H>  /  DBili  x   /  AST  139<H>  /  ALT  76<H>  /  AlkPhos  90  07-10    CAPILLARY BLOOD GLUCOSE      POCT Blood Glucose.: 121 mg/dL (10 Jul 2023 05:39)  POCT Blood Glucose.: 122 mg/dL (2023 23:11)  POCT Blood Glucose.: 119 mg/dL (2023 18:44)  POCT Blood Glucose.: 121 mg/dL (2023 13:48)      Medications:  MEDICATIONS  (STANDING):  albumin human 25% IVPB 50 milliLiter(s) IV Intermittent every 8 hours  aMIOdarone Infusion 0.501 mG/Min (16.7 mL/Hr) IV Continuous <Continuous>  chlorhexidine 2% Cloths 1 Application(s) Topical <User Schedule>  dextrose 5%. 1000 milliLiter(s) (100 mL/Hr) IV Continuous <Continuous>  dextrose 5%. 1000 milliLiter(s) (50 mL/Hr) IV Continuous <Continuous>  dextrose 50% Injectable 25 Gram(s) IV Push once  dextrose 50% Injectable 12.5 Gram(s) IV Push once  dextrose 50% Injectable 25 Gram(s) IV Push once  fluconAZOLE IVPB 400 milliGRAM(s) IV Intermittent every 24 hours  glucagon  Injectable 1 milliGRAM(s) IntraMuscular once  heparin   Injectable 5000 Unit(s) SubCutaneous every 8 hours  hydrALAZINE Injectable 10 milliGRAM(s) IV Push every 6 hours  imipenem/cilastatin  IVPB 1000 milliGRAM(s) IV Intermittent every 8 hours  insulin lispro (ADMELOG) corrective regimen sliding scale   SubCutaneous every 6 hours  labetalol Injectable 10 milliGRAM(s) IV Push every 6 hours  levothyroxine Injectable 40 MICROGram(s) IV Push <User Schedule>  lidocaine 0.5% Injectable 5 milliLiter(s) Local Injection once  lipid, fat emulsion (Fish Oil and Plant Based) 20% Infusion 0.5 Gm/kG/Day (20.83 mL/Hr) IV Continuous <Continuous>  niCARdipine Infusion 5 mG/Hr (25 mL/Hr) IV Continuous <Continuous>  octreotide  Injectable 100 MICROGram(s) IV Push every 8 hours  pantoprazole  Injectable 40 milliGRAM(s) IV Push daily  Parenteral Nutrition - Adult 1 Each (83 mL/Hr) TPN Continuous <Continuous>  Parenteral Nutrition - Adult 1 Each (83 mL/Hr) TPN Continuous <Continuous>    MEDICATIONS  (PRN):  dextrose Oral Gel 15 Gram(s) Oral once PRN Blood Glucose LESS THAN 70 milliGRAM(s)/deciliter  HYDROmorphone  Injectable 0.5 milliGRAM(s) IV Push every 4 hours PRN Severe Pain (7 - 10)      Weight: 101kg [2023]  Daily   99.9kg [2023]  Daily Weight in k.1 (10 Jul 2023 05:00)    Weight Change: Recommend daily weights for trending  IBW: 86.4kg/190lbs   % IBW: 118%    Estimated energy needs:   Ideal body weight (86.4kg) used for calculations as pt >100% of IBW, BMI <30 per [facility] Standards of Care. Needs estimated for age and adjusted for current clinical status, increased needs for post-op & open abd wound healing    Calories: 7759-0790 kcals based on 30-35 kcals/kg  Protein: 129.6-172.8g based on 1.5-2g protein/kg   *Fluid needs per team    Subjective:   78M w/ Crohn's, AFib/Flutter s/p DCCVs on amiodarone, remote ileocectomy and open appy here for SBO vs Crohn’s flare, s/p NGT decompression and now s/p lap TRE converted to open TRE, SBR x 3, left in discontinuity with abthera vac on , RTOR for ileocolic resection, small bowel anastomosis, and abdominal wall closure on , and s/p IR aspiration of perihepatic fluid on 7/3, stepped down to telemetery on . wound dehiscence on , RTOR  for exlap, washout.    Chart reviewed. Pt seen with IDT during AM rounds s/p wound dehisence on , RTOR exlap, washout, ileocolic resection with end ileostomy, blow hole colostomy, red rubber from ileostomy to small bowel anastomosis. Afebrile. HR WNL, BP WNL to low. MAPs trending >65 mmHg. On RA s/p extubation . NG Tube d/c, OJYCE drain to LIWS. PN remains primary means to nutrition, current provision provides 29.4 kcals/kg & 1.42g protein/kg IBW. GIR 3.09 mg/kg/min. Ostomy noted with minimal stool, +bile output. Pt denies n/v/abd pain. Labs reviewed- POC BG trending 119-122 mg/dL x 24hrs. TG 205H today. BUN 24H, Creat & GFR WNL. Phos 2.9- needs repletion. Nutrition related meds reviewed. Monitor feasibility of advancing diet to EN/PO diet. RDN will continue to monitor, reassess, and intervene as appropriate.       Previous Nutrition Diagnosis:  Increased Nutrient Needs R/T physiological demands for nutrient AEB post-op wound healing    Active [  X ]  Resolved [   ]    If resolved, new PES:     Goal:  Pt will meet at least 75% of protein & energy needs via most appropriate route for nutrition     Recommendations:  1. c/w TPN via central line  >> Recommend 450g dextrose + 130g AA + 250ml lipid infusion daily; provides 2550 kcals, GIR 3.06 mg/kg/min  - 29.5 kcals/kg & 1.50g protein/kg IBW  2. Hydration per team  - adjust free water prn [BUN high]  3. Monitor lytes & replenish outside TPN bag prn  4. Obtain weekly lipid panel  - hold if TG >400  5. Daily BMP/POC BG Q4-6hrs  6. Monitor for GI tract viability  - potential advancement to EN/PO diet  7. Monitor chemistry, skin integrity, GI function    RD to follow at high nutrition risk, reconsult prn.    Education: deferred     Risk Level: High [ X  ] Moderate [   ] Low [   ]

## 2023-07-10 NOTE — PROGRESS NOTE ADULT - SUBJECTIVE AND OBJECTIVE BOX
Pt seen and examined   NGT out  no complaints  ostomy working    REVIEW OF SYSTEMS:  Constitutional: No fever  Cardiovascular: No chest pain, palpitations, dizziness +sl leg edema  resp: no sob  Gastrointestinal: No abdominal or epigastric pain. No nausea, vomiting   Skin: No itching, burning, rashes or lesions       MEDICATIONS:  MEDICATIONS  (STANDING):  albumin human 25% IVPB 50 milliLiter(s) IV Intermittent every 8 hours  aMIOdarone Infusion 0.501 mG/Min (16.7 mL/Hr) IV Continuous <Continuous>  chlorhexidine 2% Cloths 1 Application(s) Topical <User Schedule>  dextrose 5%. 1000 milliLiter(s) (50 mL/Hr) IV Continuous <Continuous>  dextrose 5%. 1000 milliLiter(s) (100 mL/Hr) IV Continuous <Continuous>  dextrose 50% Injectable 25 Gram(s) IV Push once  dextrose 50% Injectable 25 Gram(s) IV Push once  dextrose 50% Injectable 12.5 Gram(s) IV Push once  fluconAZOLE IVPB 400 milliGRAM(s) IV Intermittent every 24 hours  glucagon  Injectable 1 milliGRAM(s) IntraMuscular once  heparin   Injectable 5000 Unit(s) SubCutaneous every 8 hours  hydrALAZINE Injectable 10 milliGRAM(s) IV Push every 6 hours  imipenem/cilastatin  IVPB 1000 milliGRAM(s) IV Intermittent every 8 hours  insulin lispro (ADMELOG) corrective regimen sliding scale   SubCutaneous every 6 hours  labetalol Injectable 10 milliGRAM(s) IV Push every 6 hours  levothyroxine Injectable 40 MICROGram(s) IV Push <User Schedule>  lipid, fat emulsion (Fish Oil and Plant Based) 20% Infusion 0.5 Gm/kG/Day (20.83 mL/Hr) IV Continuous <Continuous>  niCARdipine Infusion 5 mG/Hr (25 mL/Hr) IV Continuous <Continuous>  octreotide  Injectable 100 MICROGram(s) IV Push every 8 hours  pantoprazole  Injectable 40 milliGRAM(s) IV Push daily  Parenteral Nutrition - Adult 1 Each (83 mL/Hr) TPN Continuous <Continuous>  Parenteral Nutrition - Adult 1 Each (83 mL/Hr) TPN Continuous <Continuous>    MEDICATIONS  (PRN):  dextrose Oral Gel 15 Gram(s) Oral once PRN Blood Glucose LESS THAN 70 milliGRAM(s)/deciliter  HYDROmorphone  Injectable 0.5 milliGRAM(s) IV Push every 4 hours PRN Severe Pain (7 - 10)      Allergies    penicillin (Angioedema)    Intolerances        Vital Signs Last 24 Hrs  T(C): 35.9 (10 Jul 2023 10:00), Max: 36.8 (10 Jul 2023 05:20)  T(F): 96.6 (10 Jul 2023 10:00), Max: 98.2 (10 Jul 2023 05:20)  HR: 55 (10 Jul 2023 11:00) (55 - 71)  BP: 117/60 (10 Jul 2023 11:00) (104/50 - 166/83)  BP(mean): 83 (10 Jul 2023 11:00) (63 - 109)  RR: 25 (10 Jul 2023 11:00) (17 - 27)  SpO2: 95% (10 Jul 2023 11:00) (90% - 96%)    Parameters below as of 10 Jul 2023 11:00  Patient On (Oxygen Delivery Method): nasal cannula w/ humidification  O2 Flow (L/min): 4      07-09 @ 07:01  -  07-10 @ 07:00  --------------------------------------------------------  IN: 4552.1 mL / OUT: 3690 mL / NET: 862.1 mL    07-10 @ 07:01  -  07-10 @ 12:47  --------------------------------------------------------  IN: 1068.8 mL / OUT: 640 mL / NET: 428.8 mL        PHYSICAL EXAM:    General:  in no acute distress  HEENT: MMM, conjunctiva and sclera clear  Gastrointestinal: Soft non-tender non-distended; Normal bowel sounds; ileostomy working, dressings  Skin: Warm and dry. No obvious rash    LABS:      CBC Full  -  ( 10 Jul 2023 05:30 )  WBC Count : 9.59 K/uL  RBC Count : 2.91 M/uL  Hemoglobin : 8.9 g/dL  Hematocrit : 27.9 %  Platelet Count - Automated : 481 K/uL  Mean Cell Volume : 95.9 fl  Mean Cell Hemoglobin : 30.6 pg  Mean Cell Hemoglobin Concentration : 31.9 gm/dL  Auto Neutrophil # : x  Auto Lymphocyte # : x  Auto Monocyte # : x  Auto Eosinophil # : x  Auto Basophil # : x  Auto Neutrophil % : x  Auto Lymphocyte % : x  Auto Monocyte % : x  Auto Eosinophil % : x  Auto Basophil % : x    07-10    138  |  103  |  24<H>  ----------------------------<  114<H>  3.6   |  26  |  0.95    Ca    7.4<L>      10 Jul 2023 05:30  Phos  2.9     07-10  Mg     2.0     07-10    TPro  6.0  /  Alb  2.1<L>  /  TBili  2.6<H>  /  DBili  x   /  AST  139<H>  /  ALT  76<H>  /  AlkPhos  90  07-10          Urinalysis Basic - ( 10 Jul 2023 05:30 )    Color: x / Appearance: x / SG: x / pH: x  Gluc: 114 mg/dL / Ketone: x  / Bili: x / Urobili: x   Blood: x / Protein: x / Nitrite: x   Leuk Esterase: x / RBC: x / WBC x   Sq Epi: x / Non Sq Epi: x / Bacteria: x                RADIOLOGY & ADDITIONAL STUDIES (The following images were personally reviewed):

## 2023-07-10 NOTE — PROGRESS NOTE ADULT - SUBJECTIVE AND OBJECTIVE BOX
SUBJECTIVE: Patient seen at bed side. Bilious drainage noted on abdominal wound. JOYCE on the right side with serosanguinous output. JOYCE on the left side with bilious output.     MEDICATIONS  (STANDING):  albumin human 25% IVPB 50 milliLiter(s) IV Intermittent every 8 hours  aMIOdarone Infusion 0.501 mG/Min (16.7 mL/Hr) IV Continuous <Continuous>  chlorhexidine 2% Cloths 1 Application(s) Topical <User Schedule>  dextrose 5%. 1000 milliLiter(s) (100 mL/Hr) IV Continuous <Continuous>  dextrose 5%. 1000 milliLiter(s) (50 mL/Hr) IV Continuous <Continuous>  dextrose 50% Injectable 25 Gram(s) IV Push once  dextrose 50% Injectable 12.5 Gram(s) IV Push once  dextrose 50% Injectable 25 Gram(s) IV Push once  fluconAZOLE IVPB 400 milliGRAM(s) IV Intermittent every 24 hours  glucagon  Injectable 1 milliGRAM(s) IntraMuscular once  heparin   Injectable 5000 Unit(s) SubCutaneous every 8 hours  hydrALAZINE Injectable 10 milliGRAM(s) IV Push every 6 hours  imipenem/cilastatin  IVPB 1000 milliGRAM(s) IV Intermittent every 8 hours  insulin lispro (ADMELOG) corrective regimen sliding scale   SubCutaneous every 6 hours  labetalol Injectable 10 milliGRAM(s) IV Push every 6 hours  levothyroxine Injectable 40 MICROGram(s) IV Push <User Schedule>  lipid, fat emulsion (Fish Oil and Plant Based) 20% Infusion 0.5 Gm/kG/Day (20.83 mL/Hr) IV Continuous <Continuous>  niCARdipine Infusion 5 mG/Hr (25 mL/Hr) IV Continuous <Continuous>  octreotide  Injectable 100 MICROGram(s) IV Push every 8 hours  pantoprazole  Injectable 40 milliGRAM(s) IV Push daily  Parenteral Nutrition - Adult 1 Each (83 mL/Hr) TPN Continuous <Continuous>  potassium chloride  10 mEq/100 mL IVPB 10 milliEquivalent(s) IV Intermittent every 1 hour    MEDICATIONS  (PRN):  dextrose Oral Gel 15 Gram(s) Oral once PRN Blood Glucose LESS THAN 70 milliGRAM(s)/deciliter  HYDROmorphone  Injectable 0.5 milliGRAM(s) IV Push every 4 hours PRN Severe Pain (7 - 10)      Drips:     ICU Vital Signs Last 24 Hrs  T(C): 35.9 (10 Jul 2023 10:00), Max: 36.8 (10 Jul 2023 05:20)  T(F): 96.6 (10 Jul 2023 10:00), Max: 98.2 (10 Jul 2023 05:20)  HR: 65 (10 Jul 2023 10:00) (55 - 71)  BP: 133/73 (10 Jul 2023 10:00) (125/60 - 166/83)  BP(mean): 97 (10 Jul 2023 10:00) (87 - 109)  ABP: --  ABP(mean): --  RR: 26 (10 Jul 2023 10:00) (17 - 27)  SpO2: 93% (10 Jul 2023 10:00) (90% - 96%)    O2 Parameters below as of 10 Jul 2023 09:00  Patient On (Oxygen Delivery Method): nasal cannula w/ humidification  O2 Flow (L/min): 4      Physical Exam:  General: NAD  HEENT: NC/AT, EOMI, PERRLA, normal hearing, no oral lesions, neck supple w/o LAD  Pulmonary: Nonlabored breathing, no respiratory distress, CTA-B  Cardiovascular: NSR, no murmurs  Abdominal: soft, nontender, midline laparotomy w/ bridging mesh intact, bilious drainage noted in dressing. Ostomy present, patent, however appears congested unchanged from prior exam. Mucous fistula pink and vitalized. L JOYCE bilious output, R JOYCE SS.  Extremities: WWP, 5/5 strength x 4, no clubbing/cyanosis/edema  Neuro: A/O x3, CNs II-XII grossly intact, normal motor/sensation, no focal deficits  Pulses: palpable distal pulses        I&O's Summary    09 Jul 2023 07:01  -  10 Jul 2023 07:00  --------------------------------------------------------  IN: 4452.1 mL / OUT: 3690 mL / NET: 762.1 mL    10 Jul 2023 07:01  -  10 Jul 2023 10:45  --------------------------------------------------------  IN: 722 mL / OUT: 640 mL / NET: 82 mL        LABS:                        8.9    9.59  )-----------( 481      ( 10 Jul 2023 05:30 )             27.9     07-10    138  |  103  |  24<H>  ----------------------------<  114<H>  3.6   |  26  |  0.95    Ca    7.4<L>      10 Jul 2023 05:30  Phos  2.9     07-10  Mg     2.0     07-10    TPro  6.0  /  Alb  2.1<L>  /  TBili  2.6<H>  /  DBili  x   /  AST  139<H>  /  ALT  76<H>  /  AlkPhos  90  07-10      Urinalysis Basic - ( 10 Jul 2023 05:30 )    Color: x / Appearance: x / SG: x / pH: x  Gluc: 114 mg/dL / Ketone: x  / Bili: x / Urobili: x   Blood: x / Protein: x / Nitrite: x   Leuk Esterase: x / RBC: x / WBC x   Sq Epi: x / Non Sq Epi: x / Bacteria: x      CAPILLARY BLOOD GLUCOSE      POCT Blood Glucose.: 121 mg/dL (10 Jul 2023 05:39)  POCT Blood Glucose.: 122 mg/dL (09 Jul 2023 23:11)  POCT Blood Glucose.: 119 mg/dL (09 Jul 2023 18:44)  POCT Blood Glucose.: 121 mg/dL (09 Jul 2023 13:48)    LIVER FUNCTIONS - ( 10 Jul 2023 05:30 )  Alb: 2.1 g/dL / Pro: 6.0 g/dL / ALK PHOS: 90 U/L / ALT: 76 U/L / AST: 139 U/L / GGT: x

## 2023-07-10 NOTE — PROGRESS NOTE ADULT - SUBJECTIVE AND OBJECTIVE BOX
INTERVAL HPI/OVERNIGHT EVENTS: dressing change with light green staining of bottom Kerlix, minimal of top/abd pads R JOYCE - ss, L JOYCE - bilious; IV Tyl x 1 for mild pain    STATUS POST:  6/27: Laparoscopic lysis of adhesions, converted to open lysis of adhesions, SBR x 3, temporary abdominal closure, 5L cryst, 1L 5% alb, 3 pRBC, 1 FFP, 1 plts  6/28: ex lap, removal of abthera, ileocolic resection, small bowel anastamosis, , 1.6 crystalloid, 250 5%, 175 uop   7/3: IR Gendel drained perihepatic aspiration of serous fluid.   7/5: RTOR exlap, washout, ileocolic resection with end ileostomy, blow hole colostomy, fistula, red rubber from ileostomy to small bowel anastomosis; vicryl bridging mesh; R JOYCE below ileostomy, L JOYCE at small bowel enterotomy repair; 500 LR, 500 5% albumin, 3u PRBC, 2 FFP, 400 UOP,     SUBJECTIVE: Patient seen and examined at bedside. He remains in good spirits, denies abdominal pain, n/v, cp, sob. +F/+BM in pouch.      aMIOdarone Infusion 0.501 mG/Min IV Continuous <Continuous>  fluconAZOLE IVPB 400 milliGRAM(s) IV Intermittent every 24 hours  heparin   Injectable 5000 Unit(s) SubCutaneous every 8 hours  hydrALAZINE Injectable 10 milliGRAM(s) IV Push every 6 hours  imipenem/cilastatin  IVPB 1000 milliGRAM(s) IV Intermittent every 8 hours  labetalol Injectable 10 milliGRAM(s) IV Push every 6 hours  niCARdipine Infusion 5 mG/Hr IV Continuous <Continuous>      Vital Signs Last 24 Hrs  T(C): 35.9 (10 Jul 2023 10:00), Max: 36.8 (10 Jul 2023 05:20)  T(F): 96.6 (10 Jul 2023 10:00), Max: 98.2 (10 Jul 2023 05:20)  HR: 56 (10 Jul 2023 12:00) (55 - 71)  BP: 118/59 (10 Jul 2023 12:00) (104/50 - 166/83)  BP(mean): 85 (10 Jul 2023 12:00) (63 - 109)  RR: 27 (10 Jul 2023 12:00) (17 - 27)  SpO2: 95% (10 Jul 2023 12:00) (90% - 95%)    Parameters below as of 10 Jul 2023 11:00  Patient On (Oxygen Delivery Method): nasal cannula w/ humidification  O2 Flow (L/min): 4    I&O's Detail    09 Jul 2023 07:01  -  10 Jul 2023 07:00  --------------------------------------------------------  IN:    Albumin 5%  - 500 mL: 100 mL    Amiodarone: 350.7 mL    Fat Emulsion (Fish Oil &amp; Plant Based) 20% Infusion: 20.1 mL    Fat Emulsion (Fish Oil &amp; Plant Based) 20% Infusion: 249.3 mL    IV PiggyBack: 200 mL    IV PiggyBack: 300 mL    IV PiggyBack: 500 mL    IV PiggyBack: 100 mL    IV PiggyBack: 100 mL    NiCARdipine: 640 mL    TPN (Total Parenteral Nutrition): 1992 mL  Total IN: 4552.1 mL    OUT:    Bulb (mL): 25 mL    Bulb (mL): 185 mL    Ileostomy (mL): 100 mL    Nasogastric/Oral tube (mL): 200 mL    Voided (mL): 3180 mL  Total OUT: 3690 mL    Total NET: 862.1 mL      10 Jul 2023 07:01  -  10 Jul 2023 13:30  --------------------------------------------------------  IN:    Amiodarone: 66.8 mL    IV PiggyBack: 250 mL    IV PiggyBack: 300 mL    NiCARdipine: 120 mL    TPN (Total Parenteral Nutrition): 415 mL  Total IN: 1151.8 mL    OUT:    Bulb (mL): 40 mL    Voided (mL): 850 mL  Total OUT: 890 mL    Total NET: 261.8 mL          General: NAD, resting comfortably in bed  C/V: NSR  Pulm: Nonlabored breathing, no respiratory distress  Abd:  soft, nondistended, dressing taken down, bridging mesh covered in Xeroform now w/ bilious drainage in the wound bed, ileostomy ppp w/ bilious output in pouch, mucous fistula pink and vitalized. Packed wet to dry. R JOYCE SS output, L JOYCE bilious.  Extrem: WWP, no edema, SCDs in place      LABS:                        8.9    9.59  )-----------( 481      ( 10 Jul 2023 05:30 )             27.9     07-10    138  |  103  |  24<H>  ----------------------------<  114<H>  3.6   |  26  |  0.95    Ca    7.4<L>      10 Jul 2023 05:30  Phos  2.9     07-10  Mg     2.0     07-10    TPro  6.0  /  Alb  2.1<L>  /  TBili  2.6<H>  /  DBili  x   /  AST  139<H>  /  ALT  76<H>  /  AlkPhos  90  07-10      Urinalysis Basic - ( 10 Jul 2023 05:30 )    Color: x / Appearance: x / SG: x / pH: x  Gluc: 114 mg/dL / Ketone: x  / Bili: x / Urobili: x   Blood: x / Protein: x / Nitrite: x   Leuk Esterase: x / RBC: x / WBC x   Sq Epi: x / Non Sq Epi: x / Bacteria: x        RADIOLOGY & ADDITIONAL STUDIES:

## 2023-07-10 NOTE — PROGRESS NOTE ADULT - SUBJECTIVE AND OBJECTIVE BOX
AVSS  bile from LUQ drain  controlled out put  soft abdomen  NL WBC  stable H&H    Wound care  TPN  NPO  IVAB  Octreotide  red rubber removed  NGT removed

## 2023-07-10 NOTE — PROGRESS NOTE ADULT - SUBJECTIVE AND OBJECTIVE BOX
Afeb overnight BP good In RR Chest clear ant Edema unchanged  WBCs down Creatinine and lytes stable  Excellent diuresis with albumin and lasix yesterday

## 2023-07-11 NOTE — PROGRESS NOTE ADULT - SUBJECTIVE AND OBJECTIVE BOX
ON: Lasix 40 x 1 for net positive; good UOP o/n    SUBJECTIVE: Pt seen and examined at bedside this am by ICU team. Patient is lying comfortably in chair without acute complaints. Denies nausea, emesis. Reports pain is controlled. States he has been tolerating ice chips. States he has been OOB to with PT.      MEDICATIONS  (STANDING):  aMIOdarone Infusion 0.501 mG/Min (16.7 mL/Hr) IV Continuous <Continuous>  chlorhexidine 2% Cloths 1 Application(s) Topical <User Schedule>  dextrose 5%. 1000 milliLiter(s) (100 mL/Hr) IV Continuous <Continuous>  dextrose 5%. 1000 milliLiter(s) (50 mL/Hr) IV Continuous <Continuous>  dextrose 50% Injectable 25 Gram(s) IV Push once  dextrose 50% Injectable 12.5 Gram(s) IV Push once  dextrose 50% Injectable 25 Gram(s) IV Push once  glucagon  Injectable 1 milliGRAM(s) IntraMuscular once  heparin   Injectable 5000 Unit(s) SubCutaneous every 8 hours  hydrALAZINE Injectable 20 milliGRAM(s) IV Push every 6 hours  insulin lispro (ADMELOG) corrective regimen sliding scale   SubCutaneous every 6 hours  labetalol Injectable 10 milliGRAM(s) IV Push every 6 hours  levothyroxine Injectable 40 MICROGram(s) IV Push <User Schedule>  lipid, fat emulsion (Fish Oil and Plant Based) 20% Infusion 0.5 Gm/kG/Day (20.83 mL/Hr) IV Continuous <Continuous>  octreotide  Injectable 100 MICROGram(s) IV Push every 8 hours  pantoprazole  Injectable 40 milliGRAM(s) IV Push daily  Parenteral Nutrition - Adult 1 Each (83 mL/Hr) TPN Continuous <Continuous>  Parenteral Nutrition - Adult 1 Each (83 mL/Hr) TPN Continuous <Continuous>    MEDICATIONS  (PRN):  dextrose Oral Gel 15 Gram(s) Oral once PRN Blood Glucose LESS THAN 70 milliGRAM(s)/deciliter  HYDROmorphone  Injectable 0.5 milliGRAM(s) IV Push every 4 hours PRN Severe Pain (7 - 10)  melatonin 5 milliGRAM(s) Oral at bedtime PRN Sleep      Drips: Amiodarone     ICU Vital Signs Last 24 Hrs  T(C): 36.1 (11 Jul 2023 17:55), Max: 37.1 (11 Jul 2023 09:00)  T(F): 97 (11 Jul 2023 17:55), Max: 98.8 (11 Jul 2023 09:00)  HR: 59 (11 Jul 2023 20:00) (53 - 68)  BP: 177/73 (11 Jul 2023 20:00) (129/70 - 177/73)  BP(mean): 105 (11 Jul 2023 20:00) (84 - 118)  RR: 20 (11 Jul 2023 20:00) (16 - 28)  SpO2: 95% (11 Jul 2023 20:00) (93% - 98%)    O2 Parameters below as of 11 Jul 2023 20:00  Patient On (Oxygen Delivery Method): room air            Physical Exam:  General: NAD  HEENT: NC/AT, EOMI, PERRLA, normal hearing, no oral lesions, neck supple w/o LAD, R IJ in place  Pulmonary: Nonlabored breathing, no respiratory distress, CTA-B  Cardiovascular: NSR, no murmurs  Abdominal: soft, nondistended, minimal tenderness to palpation. Midline wound packed with Kerlix and ABDs - bile tinged. L JOYCE bilious, R JOYCE SS. Ostomy in RUQ noted to be dusky with minimal output  Extremities: WWP, 5/5 strength x 4, 1+ edema noted to level of calf  Neuro: A/O x3, CNs II-XII grossly intact, normal motor/sensation, no focal deficits      I&O's Summary    10 Jul 2023 07:01  -  11 Jul 2023 07:00  --------------------------------------------------------  IN: 4170.1 mL / OUT: 4295 mL / NET: -124.9 mL    11 Jul 2023 07:01  -  11 Jul 2023 20:19  --------------------------------------------------------  IN: 1587.7 mL / OUT: 1990 mL / NET: -402.3 mL        LABS:                        9.7    9.48  )-----------( 494      ( 11 Jul 2023 05:30 )             29.8     07-11    134<L>  |  98  |  26<H>  ----------------------------<  133<H>  3.9   |  26  |  1.00    Ca    7.4<L>      11 Jul 2023 05:30  Phos  3.9     07-11  Mg     1.9     07-11    TPro  6.7  /  Alb  2.5<L>  /  TBili  2.8<H>  /  DBili  x   /  AST  165<H>  /  ALT  99<H>  /  AlkPhos  103  07-11      Urinalysis Basic - ( 11 Jul 2023 05:30 )    Color: x / Appearance: x / SG: x / pH: x  Gluc: 133 mg/dL / Ketone: x  / Bili: x / Urobili: x   Blood: x / Protein: x / Nitrite: x   Leuk Esterase: x / RBC: x / WBC x   Sq Epi: x / Non Sq Epi: x / Bacteria: x      CAPILLARY BLOOD GLUCOSE      POCT Blood Glucose.: 117 mg/dL (11 Jul 2023 19:57)  POCT Blood Glucose.: 119 mg/dL (11 Jul 2023 12:47)  POCT Blood Glucose.: 120 mg/dL (11 Jul 2023 05:14)  POCT Blood Glucose.: 114 mg/dL (10 Jul 2023 23:19)    LIVER FUNCTIONS - ( 11 Jul 2023 05:30 )  Alb: 2.5 g/dL / Pro: 6.7 g/dL / ALK PHOS: 103 U/L / ALT: 99 U/L / AST: 165 U/L / GGT: x

## 2023-07-11 NOTE — PROGRESS NOTE ADULT - ASSESSMENT
78M w/ Crohn's, AFib/Flutter s/p DCCVs on amiodarone, remote ileocectomy and open appy here for SBO vs Crohns flare, s/p NGT decompression and now s/p lap TRE converted to open TRE, SBR x 3, left in discontinuity with abthera vac on 6/27, RTOR for ileocolic resection, small bowel anastomosis, and abdominal wall closure on 6/28, and s/p IR aspiration of perihepatic fluid on 7/3, stepped down to telemetery on 7/4. wound dehisence on 7/5, RTOR exlap, washout, ileocolic resection with end ileostomy, blow hole colostomy, red rubber from ileostomy to small bowel anastomosis; vicryl bridging mesh on 7/5. Transferred to SICU post op for HD monitoring. Extubated 7/6.    Neuro: Tylenol RTC. Dilaudid PRN. Hx anxiety: holding venlafaxine while NPO with NGT  CV: Keep MAP > 65. Hx of Afib/flutter: s/p DCCV, Amiodarone gtt while NPO. Labetalol 10 q6 and hydralazine standing cardene gtt for SBP< 160. Hx of HLD: holding home rosuvastatin while NPO; TTE (04/22) - PASP 50mmHg, EF 69%. D/C Albumin 25%q6. Continue diurseis with 40 of Lasix.   Pulm: Extubated 7/6 - now on RA saturating well. s/p Chest tube by CT surg (6/27--7/3)   GI: TPN/lipids, wound dehisense -- RTOR on 7/5 for exlap, washout, ileocolic resection with end ileostomy, blow hole colostomy, red rubber from ileostomy to small bowel anastomosis; vicryl bridging mesh - continue TID dressing changes, Cont Octreotide.   : Cr stable. Voids. Start 24 hour Ur Nitrogen collection to assess nutrition  ID: Perforated viscus, IR Cx 7/3: C. tertium, Lactobacillis. OR Cx 7/5 - rare yeast - Imipenem (6/30--), Fluconazole (6/30 -) OR Cx 6/28: Vanc-resistant/amp-sens (but allergic) enterococcus s/p CTX (6/26-6/30), Flagyl (6/26-6/30), Dapto (6/30-7/5).   Endo: ISS; Hx of hypothyroid: cont synthroid IV. Gout: holding home allopurinol while NPO.   PPx: SCD, HSQ.  Lines: PIV x 2, RUE PICC (6/30--), Victoria (7/8--) // DC: R Chest tube for iatrogenic PTX (6/27--7/3), R TLC (06/26-30). New R TLC (7/5 - )  Wounds/Drains: midline incision; xeroform and WTD Kerlix, tid dressing changes; R JOYCE below ileostomy, L JOYCE at small bowel enterotomy repair  PT/OT: not ordered  Dispo: SICU

## 2023-07-11 NOTE — PROGRESS NOTE ADULT - ASSESSMENT
78M POD14 laparoscopic lysis of adhesions, converted to open lysis of adhesions, SBR x 3, temporary abdominal closure, POD13 ex lap, removal of abthera, ileocolic resection, small bowel anastomosis c/b anastomotic leak resulting in wound dehiscence now POD6 RTOR exlap, washout, ileocolic resection with end ileostomy, blow hole colostomy, fistula, red rubber from ileostomy to small bowel anastomosis; vicryl bridging mesh; R JOYCE below ileostomy, L JOYCE at small bowel enterotomy repair.    Plan:  - Continue dressing change TID  - Abdominal binder at all times  - Rest of care per SICU

## 2023-07-11 NOTE — PROGRESS NOTE ADULT - ASSESSMENT
78M w/ Crohn's, AFib/Flutter s/p DCCVs on amiodarone, remote ileocectomy and open appy here for SBO vs Crohns flare, s/p NGT decompression and now s/p lap TRE converted to open TRE, SBR x 3, left in discontinuity with abthera vac on 6/27, RTOR for ileocolic resection, small bowel anastomosis, and abdominal wall closure on 6/28, and s/p IR aspiration of perihepatic fluid on 7/3, stepped down to telemetery on 7/4. wound dehisence on 7/5, RTOR exlap, washout, ileocolic resection with end ileostomy, blow hole colostomy, red rubber from ileostomy to small bowel anastomosis; vicryl bridging mesh on 7/5. Transferred to SICU post op for HD monitoring. Extubated 7/6. Bilious drainage on wound noted. On TPN, albumin.     - ID follow up regarding length of antibiotic treatment   - TID dressing change   - Monitor drain outputs   - Team 1 will follow

## 2023-07-11 NOTE — PROGRESS NOTE ADULT - SUBJECTIVE AND OBJECTIVE BOX
SUBJECTIVE: Patient seen at bed side. Bilious drainage noted on abdominal wound. JOYCE on the right side with serosanguinous output. JOYCE on the left side with bilious output.     MEDICATIONS  (STANDING):  aMIOdarone Infusion 0.501 mG/Min (16.7 mL/Hr) IV Continuous <Continuous>  chlorhexidine 2% Cloths 1 Application(s) Topical <User Schedule>  dextrose 5%. 1000 milliLiter(s) (100 mL/Hr) IV Continuous <Continuous>  dextrose 5%. 1000 milliLiter(s) (50 mL/Hr) IV Continuous <Continuous>  dextrose 50% Injectable 25 Gram(s) IV Push once  dextrose 50% Injectable 25 Gram(s) IV Push once  dextrose 50% Injectable 12.5 Gram(s) IV Push once  glucagon  Injectable 1 milliGRAM(s) IntraMuscular once  heparin   Injectable 5000 Unit(s) SubCutaneous every 8 hours  hydrALAZINE Injectable 10 milliGRAM(s) IV Push every 6 hours  imipenem/cilastatin  IVPB 1000 milliGRAM(s) IV Intermittent every 8 hours  insulin lispro (ADMELOG) corrective regimen sliding scale   SubCutaneous every 6 hours  labetalol Injectable 10 milliGRAM(s) IV Push every 6 hours  levothyroxine Injectable 40 MICROGram(s) IV Push <User Schedule>  lipid, fat emulsion (Fish Oil and Plant Based) 20% Infusion 0.5 Gm/kG/Day (20.83 mL/Hr) IV Continuous <Continuous>  octreotide  Injectable 100 MICROGram(s) IV Push every 8 hours  pantoprazole  Injectable 40 milliGRAM(s) IV Push daily  Parenteral Nutrition - Adult 1 Each (83 mL/Hr) TPN Continuous <Continuous>    MEDICATIONS  (PRN):  dextrose Oral Gel 15 Gram(s) Oral once PRN Blood Glucose LESS THAN 70 milliGRAM(s)/deciliter  HYDROmorphone  Injectable 0.5 milliGRAM(s) IV Push every 4 hours PRN Severe Pain (7 - 10)  melatonin 5 milliGRAM(s) Oral at bedtime PRN Sleep      Drips:     ICU Vital Signs Last 24 Hrs  T(C): 37.1 (11 Jul 2023 09:00), Max: 37.1 (11 Jul 2023 09:00)  T(F): 98.8 (11 Jul 2023 09:00), Max: 98.8 (11 Jul 2023 09:00)  HR: 57 (11 Jul 2023 10:00) (53 - 68)  BP: 150/67 (11 Jul 2023 10:00) (113/55 - 168/69)  BP(mean): 97 (11 Jul 2023 10:00) (79 - 118)  ABP: --  ABP(mean): --  RR: 20 (11 Jul 2023 10:00) (17 - 28)  SpO2: 96% (11 Jul 2023 10:00) (92% - 98%)    O2 Parameters below as of 11 Jul 2023 11:00  Patient On (Oxygen Delivery Method): nasal cannula  O2 Flow (L/min): 2        Physical Exam:  General: NAD  HEENT: NC/AT, EOMI, PERRLA, normal hearing, no oral lesions, neck supple w/o LAD  Pulmonary: Nonlabored breathing, no respiratory distress, CTA-B  Cardiovascular: NSR, no murmurs  Abdominal: soft, nontender, midline laparotomy w/ bridging mesh intact, bilious drainage noted in dressing. Ostomy present, patent, however appears congested unchanged from prior exam. Mucous fistula pink and vitalized. L JOYCE bilious output, R JOYCE SS.  Extremities: WWP, 5/5 strength x 4, no clubbing/cyanosis/edema  Neuro: A/O x3, CNs II-XII grossly intact, normal motor/sensation, no focal deficits  Pulses: palpable distal pulses    Lines/tubes/drains:  [place]:	      Vent settings:      I&O's Summary    10 Jul 2023 07:01  -  11 Jul 2023 07:00  --------------------------------------------------------  IN: 4170.1 mL / OUT: 4295 mL / NET: -124.9 mL    11 Jul 2023 07:01  -  11 Jul 2023 10:45  --------------------------------------------------------  IN: 648.8 mL / OUT: 260 mL / NET: 388.8 mL        LABS:                        9.7    9.48  )-----------( 494      ( 11 Jul 2023 05:30 )             29.8     07-11    134<L>  |  98  |  26<H>  ----------------------------<  133<H>  3.9   |  26  |  1.00    Ca    7.4<L>      11 Jul 2023 05:30  Phos  3.9     07-11  Mg     1.9     07-11    TPro  6.7  /  Alb  2.5<L>  /  TBili  2.8<H>  /  DBili  x   /  AST  165<H>  /  ALT  99<H>  /  AlkPhos  103  07-11      Urinalysis Basic - ( 11 Jul 2023 05:30 )    Color: x / Appearance: x / SG: x / pH: x  Gluc: 133 mg/dL / Ketone: x  / Bili: x / Urobili: x   Blood: x / Protein: x / Nitrite: x   Leuk Esterase: x / RBC: x / WBC x   Sq Epi: x / Non Sq Epi: x / Bacteria: x      CAPILLARY BLOOD GLUCOSE      POCT Blood Glucose.: 120 mg/dL (11 Jul 2023 05:14)  POCT Blood Glucose.: 114 mg/dL (10 Jul 2023 23:19)  POCT Blood Glucose.: 131 mg/dL (10 Jul 2023 17:20)  POCT Blood Glucose.: 145 mg/dL (10 Jul 2023 14:01)    LIVER FUNCTIONS - ( 11 Jul 2023 05:30 )  Alb: 2.5 g/dL / Pro: 6.7 g/dL / ALK PHOS: 103 U/L / ALT: 99 U/L / AST: 165 U/L / GGT: x

## 2023-07-11 NOTE — PROGRESS NOTE ADULT - SUBJECTIVE AND OBJECTIVE BOX
INTERVAL HPI/OVERNIGHT EVENTS: Lasix 40 x 1 for net positive; good UOP o/n    STATUS POST:    6/27: Laparoscopic lysis of adhesions, converted to open lysis of adhesions, SBR x 3, temporary abdominal closure, 5L cryst, 1L 5% alb, 3 pRBC, 1 FFP, 1 plts  6/28: ex lap, removal of abthera, ileocolic resection, small bowel anastamosis, , 1.6 crystalloid, 250 5%, 175 uop   7/3: IR Gendel drained perihepatic aspiration of serous fluid.   7/5: RTOR exlap, washout, ileocolic resection with end ileostomy, blow hole colostomy, fistula, red rubber from ileostomy to small bowel anastomosis; vicryl bridging mesh; R JOYCE below ileostomy, L JOYCE at small bowel enterotomy repair; 500 LR, 500 5% albumin, 3u PRBC, 2 FFP, 400 UOP,     SUBJECTIVE: Patient seen and examined at bedside. In good spirits. Denies abdominal pain, n/v, cp, sob. Voiding without issues.      aMIOdarone Infusion 0.501 mG/Min IV Continuous <Continuous>  heparin   Injectable 5000 Unit(s) SubCutaneous every 8 hours  hydrALAZINE Injectable 10 milliGRAM(s) IV Push every 6 hours  imipenem/cilastatin  IVPB 1000 milliGRAM(s) IV Intermittent every 8 hours  labetalol Injectable 10 milliGRAM(s) IV Push every 6 hours      Vital Signs Last 24 Hrs  T(C): 36.9 (11 Jul 2023 12:00), Max: 37.1 (11 Jul 2023 09:00)  T(F): 98.5 (11 Jul 2023 12:00), Max: 98.8 (11 Jul 2023 09:00)  HR: 65 (11 Jul 2023 13:00) (53 - 68)  BP: 150/65 (11 Jul 2023 13:00) (113/55 - 168/69)  BP(mean): 94 (11 Jul 2023 13:00) (79 - 118)  RR: 23 (11 Jul 2023 13:00) (17 - 28)  SpO2: 96% (11 Jul 2023 13:00) (92% - 98%)    Parameters below as of 11 Jul 2023 13:00  Patient On (Oxygen Delivery Method): room air      I&O's Detail    10 Jul 2023 07:01  -  11 Jul 2023 07:00  --------------------------------------------------------  IN:    Albumin 25%  -  50 mL: 150 mL    Amiodarone: 384.1 mL    Fat Emulsion (Fish Oil &amp; Plant Based) 20% Infusion: 312 mL    IV PiggyBack: 300 mL    IV PiggyBack: 500 mL    IV PiggyBack: 250 mL    IV PiggyBack: 200 mL    NiCARdipine: 165 mL    TPN (Total Parenteral Nutrition): 1909 mL  Total IN: 4170.1 mL    OUT:    Bulb (mL): 40 mL    Bulb (mL): 80 mL    Ileostomy (mL): 5 mL    Voided (mL): 4170 mL  Total OUT: 4295 mL    Total NET: -124.9 mL      11 Jul 2023 07:01  -  11 Jul 2023 13:55  --------------------------------------------------------  IN:    Amiodarone: 100.2 mL    IV PiggyBack: 250 mL    TPN (Total Parenteral Nutrition): 498 mL  Total IN: 848.2 mL    OUT:    Bulb (mL): 0 mL    Bulb (mL): 70 mL    Fat Emulsion (Fish Oil &amp; Plant Based) 20% Infusion: 0 mL    Ileostomy (mL): 0 mL    NiCARdipine: 0 mL    Voided (mL): 1100 mL  Total OUT: 1170 mL    Total NET: -321.8 mL          General: NAD, resting comfortably in bed  C/V: NSR  Pulm: Nonlabored breathing, no respiratory distress  Abd: soft, nondistended, dressing taken down, bridging mesh covered in Xeroform now w/ bilious drainage in the wound bed, ileostomy ppp w/ bilious output in pouch, mucous fistula pink and vitalized. Packed wet to dry. R JOYCE SS output, L JOYCE bilious.  Extrem: WWP, no edema, SCDs in place        LABS:                        9.7    9.48  )-----------( 494      ( 11 Jul 2023 05:30 )             29.8     07-11    134<L>  |  98  |  26<H>  ----------------------------<  133<H>  3.9   |  26  |  1.00    Ca    7.4<L>      11 Jul 2023 05:30  Phos  3.9     07-11  Mg     1.9     07-11    TPro  6.7  /  Alb  2.5<L>  /  TBili  2.8<H>  /  DBili  x   /  AST  165<H>  /  ALT  99<H>  /  AlkPhos  103  07-11      Urinalysis Basic - ( 11 Jul 2023 05:30 )    Color: x / Appearance: x / SG: x / pH: x  Gluc: 133 mg/dL / Ketone: x  / Bili: x / Urobili: x   Blood: x / Protein: x / Nitrite: x   Leuk Esterase: x / RBC: x / WBC x   Sq Epi: x / Non Sq Epi: x / Bacteria: x        RADIOLOGY & ADDITIONAL STUDIES:

## 2023-07-11 NOTE — PROGRESS NOTE ADULT - ATTENDING COMMENTS
Crohn's, AF, s/p SBR with eventual closure, wound dehiscence and RTOR for washout, ileostomy after ileocolic resection, blowhole colostomy  physical as above  continue TPN  PT  respiratory stable  caspofungin and imipenem  check nutritional parameters  continue diuresis with stable renal function with negative fluid balance  decision making of high complexity

## 2023-07-11 NOTE — PROGRESS NOTE ADULT - SUBJECTIVE AND OBJECTIVE BOX
Pt seen and examined   ileostomy a little sanguinous today  tolerating ice chips    REVIEW OF SYSTEMS:  Constitutional: No fever,   Cardiovascular: No chest pain, palpitations,   Gastrointestinal: No abdominal or epigastric pain. No nausea, vomiting   Skin: No itching, burning, rashes or lesions       MEDICATIONS:  MEDICATIONS  (STANDING):  aMIOdarone Infusion 0.501 mG/Min (16.7 mL/Hr) IV Continuous <Continuous>  chlorhexidine 2% Cloths 1 Application(s) Topical <User Schedule>  dextrose 5%. 1000 milliLiter(s) (100 mL/Hr) IV Continuous <Continuous>  dextrose 5%. 1000 milliLiter(s) (50 mL/Hr) IV Continuous <Continuous>  dextrose 50% Injectable 25 Gram(s) IV Push once  dextrose 50% Injectable 12.5 Gram(s) IV Push once  dextrose 50% Injectable 25 Gram(s) IV Push once  glucagon  Injectable 1 milliGRAM(s) IntraMuscular once  heparin   Injectable 5000 Unit(s) SubCutaneous every 8 hours  hydrALAZINE Injectable 10 milliGRAM(s) IV Push every 6 hours  imipenem/cilastatin  IVPB 1000 milliGRAM(s) IV Intermittent every 8 hours  insulin lispro (ADMELOG) corrective regimen sliding scale   SubCutaneous every 6 hours  labetalol Injectable 10 milliGRAM(s) IV Push every 6 hours  levothyroxine Injectable 40 MICROGram(s) IV Push <User Schedule>  lipid, fat emulsion (Fish Oil and Plant Based) 20% Infusion 0.5 Gm/kG/Day (20.83 mL/Hr) IV Continuous <Continuous>  octreotide  Injectable 100 MICROGram(s) IV Push every 8 hours  pantoprazole  Injectable 40 milliGRAM(s) IV Push daily  Parenteral Nutrition - Adult 1 Each (83 mL/Hr) TPN Continuous <Continuous>  Parenteral Nutrition - Adult 1 Each (83 mL/Hr) TPN Continuous <Continuous>    MEDICATIONS  (PRN):  dextrose Oral Gel 15 Gram(s) Oral once PRN Blood Glucose LESS THAN 70 milliGRAM(s)/deciliter  HYDROmorphone  Injectable 0.5 milliGRAM(s) IV Push every 4 hours PRN Severe Pain (7 - 10)  melatonin 5 milliGRAM(s) Oral at bedtime PRN Sleep      Allergies    penicillin (Angioedema)    Intolerances        Vital Signs Last 24 Hrs  T(C): 36.9 (11 Jul 2023 12:00), Max: 37.1 (11 Jul 2023 09:00)  T(F): 98.5 (11 Jul 2023 12:00), Max: 98.8 (11 Jul 2023 09:00)  HR: 63 (11 Jul 2023 12:00) (53 - 68)  BP: 160/74 (11 Jul 2023 11:00) (113/55 - 168/69)  BP(mean): 107 (11 Jul 2023 11:00) (79 - 118)  RR: 25 (11 Jul 2023 12:00) (17 - 28)  SpO2: 96% (11 Jul 2023 12:00) (92% - 98%)    Parameters below as of 11 Jul 2023 11:00  Patient On (Oxygen Delivery Method): nasal cannula  O2 Flow (L/min): 2      07-10 @ 07:01 - 07-11 @ 07:00  --------------------------------------------------------  IN: 4170.1 mL / OUT: 4295 mL / NET: -124.9 mL    07-11 @ 07:01  -  07-11 @ 12:54  --------------------------------------------------------  IN: 848.2 mL / OUT: 530 mL / NET: 318.2 mL        PHYSICAL EXAM:    General: out of bed to chair; in no acute distress  HEENT: MMM, conjunctiva and sclera clear  Gastrointestinal: Soft non-tender non-distended; Normal bowel sounds; dressings on, ileostomy sl sanguinous  Skin: Warm and dry. No obvious rash    LABS:      CBC Full  -  ( 11 Jul 2023 05:30 )  WBC Count : 9.48 K/uL  RBC Count : 3.20 M/uL  Hemoglobin : 9.7 g/dL  Hematocrit : 29.8 %  Platelet Count - Automated : 494 K/uL  Mean Cell Volume : 93.1 fl  Mean Cell Hemoglobin : 30.3 pg  Mean Cell Hemoglobin Concentration : 32.6 gm/dL  Auto Neutrophil # : x  Auto Lymphocyte # : x  Auto Monocyte # : x  Auto Eosinophil # : x  Auto Basophil # : x  Auto Neutrophil % : x  Auto Lymphocyte % : x  Auto Monocyte % : x  Auto Eosinophil % : x  Auto Basophil % : x    07-11    134<L>  |  98  |  26<H>  ----------------------------<  133<H>  3.9   |  26  |  1.00    Ca    7.4<L>      11 Jul 2023 05:30  Phos  3.9     07-11  Mg     1.9     07-11    TPro  6.7  /  Alb  2.5<L>  /  TBili  2.8<H>  /  DBili  x   /  AST  165<H>  /  ALT  99<H>  /  AlkPhos  103  07-11          Urinalysis Basic - ( 11 Jul 2023 05:30 )    Color: x / Appearance: x / SG: x / pH: x  Gluc: 133 mg/dL / Ketone: x  / Bili: x / Urobili: x   Blood: x / Protein: x / Nitrite: x   Leuk Esterase: x / RBC: x / WBC x   Sq Epi: x / Non Sq Epi: x / Bacteria: x                RADIOLOGY & ADDITIONAL STUDIES (The following images were personally reviewed): Pt seen and examined   ileostomy a little sanguinous today  tolerating ice chips    REVIEW OF SYSTEMS:  Constitutional: No fever,   Cardiovascular: No chest pain, palpitations,   Gastrointestinal: No abdominal or epigastric pain. No nausea, vomiting   Skin: No itching, burning, rashes or lesions       MEDICATIONS:  MEDICATIONS  (STANDING):  aMIOdarone Infusion 0.501 mG/Min (16.7 mL/Hr) IV Continuous <Continuous>  chlorhexidine 2% Cloths 1 Application(s) Topical <User Schedule>  dextrose 5%. 1000 milliLiter(s) (100 mL/Hr) IV Continuous <Continuous>  dextrose 5%. 1000 milliLiter(s) (50 mL/Hr) IV Continuous <Continuous>  dextrose 50% Injectable 25 Gram(s) IV Push once  dextrose 50% Injectable 12.5 Gram(s) IV Push once  dextrose 50% Injectable 25 Gram(s) IV Push once  glucagon  Injectable 1 milliGRAM(s) IntraMuscular once  heparin   Injectable 5000 Unit(s) SubCutaneous every 8 hours  hydrALAZINE Injectable 10 milliGRAM(s) IV Push every 6 hours  imipenem/cilastatin  IVPB 1000 milliGRAM(s) IV Intermittent every 8 hours  insulin lispro (ADMELOG) corrective regimen sliding scale   SubCutaneous every 6 hours  labetalol Injectable 10 milliGRAM(s) IV Push every 6 hours  levothyroxine Injectable 40 MICROGram(s) IV Push <User Schedule>  lipid, fat emulsion (Fish Oil and Plant Based) 20% Infusion 0.5 Gm/kG/Day (20.83 mL/Hr) IV Continuous <Continuous>  octreotide  Injectable 100 MICROGram(s) IV Push every 8 hours  pantoprazole  Injectable 40 milliGRAM(s) IV Push daily  Parenteral Nutrition - Adult 1 Each (83 mL/Hr) TPN Continuous <Continuous>  Parenteral Nutrition - Adult 1 Each (83 mL/Hr) TPN Continuous <Continuous>    MEDICATIONS  (PRN):  dextrose Oral Gel 15 Gram(s) Oral once PRN Blood Glucose LESS THAN 70 milliGRAM(s)/deciliter  HYDROmorphone  Injectable 0.5 milliGRAM(s) IV Push every 4 hours PRN Severe Pain (7 - 10)  melatonin 5 milliGRAM(s) Oral at bedtime PRN Sleep      Allergies    penicillin (Angioedema)    Intolerances        Vital Signs Last 24 Hrs  T(C): 36.9 (11 Jul 2023 12:00), Max: 37.1 (11 Jul 2023 09:00)  T(F): 98.5 (11 Jul 2023 12:00), Max: 98.8 (11 Jul 2023 09:00)  HR: 63 (11 Jul 2023 12:00) (53 - 68)  BP: 160/74 (11 Jul 2023 11:00) (113/55 - 168/69)  BP(mean): 107 (11 Jul 2023 11:00) (79 - 118)  RR: 25 (11 Jul 2023 12:00) (17 - 28)  SpO2: 96% (11 Jul 2023 12:00) (92% - 98%)    Parameters below as of 11 Jul 2023 11:00  Patient On (Oxygen Delivery Method): nasal cannula  O2 Flow (L/min): 2      07-10 @ 07:01 - 07-11 @ 07:00  --------------------------------------------------------  IN: 4170.1 mL / OUT: 4295 mL / NET: -124.9 mL    07-11 @ 07:01  -  07-11 @ 12:54  --------------------------------------------------------  IN: 848.2 mL / OUT: 530 mL / NET: 318.2 mL        PHYSICAL EXAM:    General: out of bed to chair; in no acute distress  HEENT: MMM, conjunctiva and sclera clear  Gastrointestinal: Soft non-tender non-distended; Normal bowel sounds; dressings on, ileostomy sl sanguinous  Skin: Warm and dry. No obvious rash  Ext: 2+ edema    LABS:      CBC Full  -  ( 11 Jul 2023 05:30 )  WBC Count : 9.48 K/uL  RBC Count : 3.20 M/uL  Hemoglobin : 9.7 g/dL  Hematocrit : 29.8 %  Platelet Count - Automated : 494 K/uL  Mean Cell Volume : 93.1 fl  Mean Cell Hemoglobin : 30.3 pg  Mean Cell Hemoglobin Concentration : 32.6 gm/dL  Auto Neutrophil # : x  Auto Lymphocyte # : x  Auto Monocyte # : x  Auto Eosinophil # : x  Auto Basophil # : x  Auto Neutrophil % : x  Auto Lymphocyte % : x  Auto Monocyte % : x  Auto Eosinophil % : x  Auto Basophil % : x    07-11    134<L>  |  98  |  26<H>  ----------------------------<  133<H>  3.9   |  26  |  1.00    Ca    7.4<L>      11 Jul 2023 05:30  Phos  3.9     07-11  Mg     1.9     07-11    TPro  6.7  /  Alb  2.5<L>  /  TBili  2.8<H>  /  DBili  x   /  AST  165<H>  /  ALT  99<H>  /  AlkPhos  103  07-11          Urinalysis Basic - ( 11 Jul 2023 05:30 )    Color: x / Appearance: x / SG: x / pH: x  Gluc: 133 mg/dL / Ketone: x  / Bili: x / Urobili: x   Blood: x / Protein: x / Nitrite: x   Leuk Esterase: x / RBC: x / WBC x   Sq Epi: x / Non Sq Epi: x / Bacteria: x                RADIOLOGY & ADDITIONAL STUDIES (The following images were personally reviewed):

## 2023-07-11 NOTE — PROGRESS NOTE ADULT - ASSESSMENT
on TPN  diet per surgical team  antibiotics per ID on TPN  diet per surgical team  antibiotics per ID  diuretics for edema

## 2023-07-12 NOTE — PROGRESS NOTE ADULT - ATTENDING COMMENTS
AF, Crohn's, s/p SBR with eventual closure and ileocolic resection, SB anastomosis, RTOR for dehiscence with ileocolic resection, blowhole colostomy, end ileostomy  physical as above  tolerating PT  continue diuresis  finishing imipenem, fluconazole  DC central line  continue amiodarone, metoprolol, losartan  decision making of high complexity

## 2023-07-12 NOTE — PROGRESS NOTE ADULT - SUBJECTIVE AND OBJECTIVE BOX
labs stable  AVSS  woundcare  low output fistula  PICC  D/C CVP  OOB  IVAB  Pulmonary toilet  ID F/U

## 2023-07-12 NOTE — ADVANCED PRACTICE NURSE CONSULT - ASSESSMENT
Appliance intact, previous education reviewed. Would prefer to change appliance when spouse present, phone number left with patient to call if spouse arrives before 4 pm.

## 2023-07-12 NOTE — PROGRESS NOTE ADULT - ASSESSMENT
78M w/ Crohn's, AFib/Flutter s/p DCCVs on amiodarone, remote ileocectomy and open appy here for SBO vs Crohns flare, s/p NGT decompression and now s/p lap TRE converted to open TRE, SBR x 3, left in discontinuity with abthera vac on 6/27, RTOR for ileocolic resection, small bowel anastomosis, and abdominal wall closure on 6/28, and s/p IR aspiration of perihepatic fluid on 7/3, stepped down to telemetery on 7/4. wound dehisence on 7/5, RTOR exlap, washout, ileocolic resection with end ileostomy, blow hole colostomy, red rubber from ileostomy to small bowel anastomosis; vicryl bridging mesh on 7/5. Transferred to SICU post op for HD monitoring. Extubated 7/6.    Neuro: Tylenol and Dilaudid PRN for pain mgmt. Hx anxiety: Restart venlafaxine  CV: Keep MAP > 65. Hx of Afib/flutter: s/p DCCV, Transition to PO medications - amiodarone, losartan, metoprolol. Continue hydralazine IV PRN for SBOP < 160. Hx of HLD: holding home rosuvastatin while NPO; TTE (04/22) - PASP 50mmHg, EF 69%. Continue diurseis with 40 of Lasix.   Pulm: Extubated 7/6 - now on RA saturating well. s/p Chest tube by CT surg (6/27--7/3)   GI: Adv to [facility]. TPN/lipids, wound dehisense -- RTOR on 7/5 for exlap, washout, ileocolic resection with end ileostomy, blow hole colostomy, red rubber from ileostomy to small bowel anastomosis; vicryl bridging mesh - continue TID dressing changes, Cont Octreotide.   : Cr stable. Voids. Start 24 hour Ur Nitrogen collection to assess nutrition - will complete today  ID: Perforated viscus, IR Cx 7/3: C. tertium, Lactobacillis. OR Cx 7/5 - rare yeast - Imipenem (6/30-7/12), Fluconazole (6/30 -7/12) OR Cx 6/28: Vanc-resistant/amp-sens (but allergic) enterococcus s/p CTX (6/26-6/30), Flagyl (6/26-6/30), Dapto (6/30-7/5). will now monitor off abx  Endo: ISS; Hx of hypothyroid: cont synthroid IV. Gout: holding home allopurionol  PPx: SCD, HSQ.  Lines: PIV x 2, RUE PICC (6/30--), [place] (7/8-7/10) // DC: R Chest tube for iatrogenic PTX (6/27--7/3), R TLC (06/26-30). New R TLC (7/5 -7/12)  Wounds/Drains: midline incision; xeroform and WTD Kerlix, tid dressing changes; R JOYCE below ileostomy, L JOYCE at small bowel enterotomy repair  PT/OT: not ordered  Dispo: SICU

## 2023-07-12 NOTE — PROGRESS NOTE ADULT - SUBJECTIVE AND OBJECTIVE BOX
Pt seen and examined   steady progress  out of bed to chair  did some PT  tolerating ice chips  edema/anasarca improving    REVIEW OF SYSTEMS:  Constitutional: No fever,   Cardiovascular: No chest pain, palpitations, dizziness  Gastrointestinal: No abdominal or epigastric pain. No nausea, vomiting   Skin: No itching, burning, rashes or lesions       MEDICATIONS:  MEDICATIONS  (STANDING):  chlorhexidine 2% Cloths 1 Application(s) Topical <User Schedule>  dextrose 5%. 1000 milliLiter(s) (100 mL/Hr) IV Continuous <Continuous>  dextrose 5%. 1000 milliLiter(s) (50 mL/Hr) IV Continuous <Continuous>  dextrose 50% Injectable 25 Gram(s) IV Push once  dextrose 50% Injectable 25 Gram(s) IV Push once  dextrose 50% Injectable 12.5 Gram(s) IV Push once  glucagon  Injectable 1 milliGRAM(s) IntraMuscular once  heparin   Injectable 5000 Unit(s) SubCutaneous every 8 hours  insulin lispro (ADMELOG) corrective regimen sliding scale   SubCutaneous every 6 hours  levothyroxine Injectable 40 MICROGram(s) IV Push <User Schedule>  lipid, fat emulsion (Fish Oil and Plant Based) 20% Infusion 0.5 Gm/kG/Day (20.83 mL/Hr) IV Continuous <Continuous>  losartan 25 milliGRAM(s) Oral daily  metoprolol tartrate 25 milliGRAM(s) Oral every 12 hours  octreotide  Injectable 100 MICROGram(s) IV Push every 8 hours  pantoprazole  Injectable 40 milliGRAM(s) IV Push daily  Parenteral Nutrition - Adult 1 Each (83 mL/Hr) TPN Continuous <Continuous>  Parenteral Nutrition - Adult 1 Each (83 mL/Hr) TPN Continuous <Continuous>    MEDICATIONS  (PRN):  dextrose Oral Gel 15 Gram(s) Oral once PRN Blood Glucose LESS THAN 70 milliGRAM(s)/deciliter  HYDROmorphone  Injectable 0.5 milliGRAM(s) IV Push every 4 hours PRN Severe Pain (7 - 10)  melatonin 5 milliGRAM(s) Oral at bedtime PRN Sleep      Allergies    penicillin (Angioedema)    Intolerances        Vital Signs Last 24 Hrs  T(C): 37.1 (12 Jul 2023 09:00), Max: 37.1 (11 Jul 2023 21:55)  T(F): 98.8 (12 Jul 2023 09:00), Max: 98.8 (12 Jul 2023 09:00)  HR: 60 (12 Jul 2023 12:00) (55 - 67)  BP: 180/79 (12 Jul 2023 12:00) (138/67 - 185/82)  BP(mean): 113 (12 Jul 2023 12:00) (94 - 118)  RR: 20 (12 Jul 2023 12:00) (16 - 28)  SpO2: 94% (12 Jul 2023 12:00) (93% - 97%)    Parameters below as of 12 Jul 2023 12:00  Patient On (Oxygen Delivery Method): room air        07-11 @ 07:01  -  07-12 @ 07:00  --------------------------------------------------------  IN: 2913.2 mL / OUT: 4000 mL / NET: -1086.8 mL    07-12 @ 07:01 - 07-12 @ 12:10  --------------------------------------------------------  IN: 508.4 mL / OUT: 750 mL / NET: -241.6 mL        PHYSICAL EXAM:    General: in no acute distress  HEENT: MMM, conjunctiva and sclera clear  Gastrointestinal: Soft non-tender non-distended; Normal bowel sounds; ileostomy, JPs  Skin: Warm and dry. No obvious rash    LABS:      CBC Full  -  ( 12 Jul 2023 03:48 )  WBC Count : 9.25 K/uL  RBC Count : 3.14 M/uL  Hemoglobin : 9.7 g/dL  Hematocrit : 30.0 %  Platelet Count - Automated : 476 K/uL  Mean Cell Volume : 95.5 fl  Mean Cell Hemoglobin : 30.9 pg  Mean Cell Hemoglobin Concentration : 32.3 gm/dL  Auto Neutrophil # : x  Auto Lymphocyte # : x  Auto Monocyte # : x  Auto Eosinophil # : x  Auto Basophil # : x  Auto Neutrophil % : x  Auto Lymphocyte % : x  Auto Monocyte % : x  Auto Eosinophil % : x  Auto Basophil % : x    07-12    132<L>  |  99  |  26<H>  ----------------------------<  123<H>  3.9   |  27  |  1.02    Ca    7.3<L>      12 Jul 2023 03:48  Phos  3.6     07-12  Mg     1.9     07-12    TPro  6.7  /  Alb  2.5<L>  /  TBili  2.8<H>  /  DBili  x   /  AST  165<H>  /  ALT  99<H>  /  AlkPhos  103  07-11          Urinalysis Basic - ( 12 Jul 2023 03:48 )    Color: x / Appearance: x / SG: x / pH: x  Gluc: 123 mg/dL / Ketone: x  / Bili: x / Urobili: x   Blood: x / Protein: x / Nitrite: x   Leuk Esterase: x / RBC: x / WBC x   Sq Epi: x / Non Sq Epi: x / Bacteria: x                RADIOLOGY & ADDITIONAL STUDIES (The following images were personally reviewed):

## 2023-07-12 NOTE — PROGRESS NOTE ADULT - SUBJECTIVE AND OBJECTIVE BOX
SUBJECTIVE: Patient is lying comfortably in chair without acute complaints. Tolerating ice chips.        MEDICATIONS  (STANDING):  atorvastatin 20 milliGRAM(s) Oral at bedtime  chlorhexidine 2% Cloths 1 Application(s) Topical <User Schedule>  dextrose 5%. 1000 milliLiter(s) (100 mL/Hr) IV Continuous <Continuous>  dextrose 5%. 1000 milliLiter(s) (50 mL/Hr) IV Continuous <Continuous>  dextrose 50% Injectable 25 Gram(s) IV Push once  dextrose 50% Injectable 12.5 Gram(s) IV Push once  dextrose 50% Injectable 25 Gram(s) IV Push once  furosemide   Injectable 40 milliGRAM(s) IV Push once  glucagon  Injectable 1 milliGRAM(s) IntraMuscular once  heparin   Injectable 5000 Unit(s) SubCutaneous every 8 hours  insulin lispro (ADMELOG) corrective regimen sliding scale   SubCutaneous every 6 hours  levothyroxine Injectable 40 MICROGram(s) IV Push <User Schedule>  lipid, fat emulsion (Fish Oil and Plant Based) 20% Infusion 0.5 Gm/kG/Day (20.83 mL/Hr) IV Continuous <Continuous>  losartan 25 milliGRAM(s) Oral daily  metoprolol tartrate 25 milliGRAM(s) Oral every 12 hours  octreotide  Injectable 100 MICROGram(s) IV Push every 8 hours  pantoprazole  Injectable 40 milliGRAM(s) IV Push daily  Parenteral Nutrition - Adult 1 Each (83 mL/Hr) TPN Continuous <Continuous>  Parenteral Nutrition - Adult 1 Each (83 mL/Hr) TPN Continuous <Continuous>  venlafaxine XR. 150 milliGRAM(s) Oral daily    MEDICATIONS  (PRN):  dextrose Oral Gel 15 Gram(s) Oral once PRN Blood Glucose LESS THAN 70 milliGRAM(s)/deciliter  hydrALAZINE Injectable 10 milliGRAM(s) IV Push every 6 hours PRN SBP > 160  HYDROmorphone  Injectable 0.5 milliGRAM(s) IV Push every 4 hours PRN Severe Pain (7 - 10)  melatonin 5 milliGRAM(s) Oral at bedtime PRN Sleep      Drips:     ICU Vital Signs Last 24 Hrs  T(C): 36.9 (12 Jul 2023 13:00), Max: 37.1 (11 Jul 2023 21:55)  T(F): 98.5 (12 Jul 2023 13:00), Max: 98.8 (12 Jul 2023 09:00)  HR: 67 (12 Jul 2023 16:00) (56 - 69)  BP: 180/113 (12 Jul 2023 16:00) (138/67 - 185/82)  BP(mean): 140 (12 Jul 2023 16:00) (93 - 140)  ABP: --  ABP(mean): --  RR: 26 (12 Jul 2023 16:00) (18 - 34)  SpO2: 98% (12 Jul 2023 16:00) (93% - 98%)    O2 Parameters below as of 12 Jul 2023 16:00  Patient On (Oxygen Delivery Method): room air            Physical Exam:  General: NAD, resting comfortably in bed  C/V: NSR  Pulm: Nonlabored breathing, no respiratory distress  Abd: soft, nondistended, dressing taken down, bridging mesh covered in Xeroform now w/ bilious drainage in the wound bed, ileostomy ppp w/ bilious output in pouch, mucous fistula pink and vitalized. Packed wet to dry. R JOYCE SS output, L JOYCE bilious.  Extrem: WWP, no edema, SCDs in place      I&O's Summary    11 Jul 2023 07:01  -  12 Jul 2023 07:00  --------------------------------------------------------  IN: 2913.2 mL / OUT: 4000 mL / NET: -1086.8 mL    12 Jul 2023 07:01  -  12 Jul 2023 17:16  --------------------------------------------------------  IN: 1320.4 mL / OUT: 1550 mL / NET: -229.6 mL        LABS:                        9.7    9.25  )-----------( 476      ( 12 Jul 2023 03:48 )             30.0     07-12    132<L>  |  99  |  26<H>  ----------------------------<  123<H>  3.9   |  27  |  1.02    Ca    7.3<L>      12 Jul 2023 03:48  Phos  3.6     07-12  Mg     1.9     07-12    TPro  6.7  /  Alb  2.5<L>  /  TBili  2.8<H>  /  DBili  x   /  AST  165<H>  /  ALT  99<H>  /  AlkPhos  103  07-11      Urinalysis Basic - ( 12 Jul 2023 03:48 )    Color: x / Appearance: x / SG: x / pH: x  Gluc: 123 mg/dL / Ketone: x  / Bili: x / Urobili: x   Blood: x / Protein: x / Nitrite: x   Leuk Esterase: x / RBC: x / WBC x   Sq Epi: x / Non Sq Epi: x / Bacteria: x      CAPILLARY BLOOD GLUCOSE      POCT Blood Glucose.: 121 mg/dL (12 Jul 2023 11:02)  POCT Blood Glucose.: 116 mg/dL (12 Jul 2023 05:53)  POCT Blood Glucose.: 115 mg/dL (11 Jul 2023 23:45)  POCT Blood Glucose.: 117 mg/dL (11 Jul 2023 19:57)    LIVER FUNCTIONS - ( 11 Jul 2023 05:30 )  Alb: 2.5 g/dL / Pro: 6.7 g/dL / ALK PHOS: 103 U/L / ALT: 99 U/L / AST: 165 U/L / GGT: x

## 2023-07-12 NOTE — PROGRESS NOTE ADULT - ASSESSMENT
78M POD15 laparoscopic lysis of adhesions, converted to open lysis of adhesions, SBR x 3, temporary abdominal closure, POD14 ex lap, removal of abthera, ileocolic resection, small bowel anastomosis c/b anastomotic leak resulting in wound dehiscence now POD7 RTOR exlap, washout, ileocolic resection with end ileostomy, blow hole colostomy, fistula, red rubber from ileostomy to small bowel anastomosis; vicryl bridging mesh; R JOYCE below ileostomy, L JOCYE at small bowel enterotomy repair.    Plan:  - Continue dressing change TID  - Abdominal binder at all times  - Rest of care per SICU

## 2023-07-12 NOTE — PROGRESS NOTE ADULT - SUBJECTIVE AND OBJECTIVE BOX
OOB Feels stronger VS stable Afeb In RR Good BS ant LE edema better with RLE slightly > LLE  WBCs down creatinine and lytes ok complains of pain/discomfort

## 2023-07-12 NOTE — PROGRESS NOTE ADULT - SUBJECTIVE AND OBJECTIVE BOX
ON: Dressing change, Given 40 mg IVP Lasix. Net Neg 400      SUBJECTIVE: Pt seen and examined at bedside this am by ICU team. Patient is lying comfortably in chair without acute complaints. States he has been tolerating ice chips. Denies nausea, emesis. Denies significant abdominal pain.       MEDICATIONS  (STANDING):  atorvastatin 20 milliGRAM(s) Oral at bedtime  chlorhexidine 2% Cloths 1 Application(s) Topical <User Schedule>  dextrose 5%. 1000 milliLiter(s) (100 mL/Hr) IV Continuous <Continuous>  dextrose 5%. 1000 milliLiter(s) (50 mL/Hr) IV Continuous <Continuous>  dextrose 50% Injectable 25 Gram(s) IV Push once  dextrose 50% Injectable 25 Gram(s) IV Push once  dextrose 50% Injectable 12.5 Gram(s) IV Push once  glucagon  Injectable 1 milliGRAM(s) IntraMuscular once  heparin   Injectable 5000 Unit(s) SubCutaneous every 8 hours  insulin lispro (ADMELOG) corrective regimen sliding scale   SubCutaneous every 6 hours  levothyroxine Injectable 40 MICROGram(s) IV Push <User Schedule>  lipid, fat emulsion (Fish Oil and Plant Based) 20% Infusion 0.5 Gm/kG/Day (20.83 mL/Hr) IV Continuous <Continuous>  losartan 25 milliGRAM(s) Oral daily  metoprolol tartrate 25 milliGRAM(s) Oral every 12 hours  octreotide  Injectable 100 MICROGram(s) IV Push every 8 hours  pantoprazole  Injectable 40 milliGRAM(s) IV Push daily  Parenteral Nutrition - Adult 1 Each (83 mL/Hr) TPN Continuous <Continuous>  Parenteral Nutrition - Adult 1 Each (83 mL/Hr) TPN Continuous <Continuous>  venlafaxine XR. 150 milliGRAM(s) Oral daily    MEDICATIONS  (PRN):  dextrose Oral Gel 15 Gram(s) Oral once PRN Blood Glucose LESS THAN 70 milliGRAM(s)/deciliter  hydrALAZINE Injectable 10 milliGRAM(s) IV Push every 6 hours PRN SBP > 160  HYDROmorphone  Injectable 0.5 milliGRAM(s) IV Push every 4 hours PRN Severe Pain (7 - 10)  melatonin 5 milliGRAM(s) Oral at bedtime PRN Sleep      Drips:     ICU Vital Signs Last 24 Hrs  T(C): 36.9 (12 Jul 2023 13:00), Max: 37.1 (11 Jul 2023 21:55)  T(F): 98.5 (12 Jul 2023 13:00), Max: 98.8 (12 Jul 2023 09:00)  HR: 63 (12 Jul 2023 13:40) (55 - 67)  BP: 162/99 (12 Jul 2023 13:40) (138/67 - 185/82)  BP(mean): 123 (12 Jul 2023 13:40) (97 - 123)  ABP: --  ABP(mean): --  RR: 22 (12 Jul 2023 13:40) (16 - 28)  SpO2: 95% (12 Jul 2023 13:40) (93% - 96%)    O2 Parameters below as of 12 Jul 2023 13:40  Patient On (Oxygen Delivery Method): room air            Physical Exam:  General: NAD  HEENT: NC/AT, EOMI, PERRLA, normal hearing, no oral lesions, neck supple w/o LAD  Pulmonary: Nonlabored breathing, no respiratory distress, CTA-B, saturating well on RA  Cardiovascular: NSR, no murmurs  Abdominal: soft, nondistended, minimally tender at edges of incision. Bridging vicryl mesh noted and dressed with kerlix, ABD. JOYCE x2 noted - SS and bilious. ostomy with dark SS output in ostomy appliance  Extremities: WWP, 5/5 strength x 4, 1+ edema noted in b/l lower extremities  Neuro: A/O x3, CNs II-XII grossly intact, normal motor/sensation, no focal deficits  Pulses: palpable distal pulses          I&O's Summary    11 Jul 2023 07:01  -  12 Jul 2023 07:00  --------------------------------------------------------  IN: 2913.2 mL / OUT: 4000 mL / NET: -1086.8 mL    12 Jul 2023 07:01  -  12 Jul 2023 14:29  --------------------------------------------------------  IN: 914.4 mL / OUT: 950 mL / NET: -35.6 mL        LABS:                        9.7    9.25  )-----------( 476      ( 12 Jul 2023 03:48 )             30.0     07-12    132<L>  |  99  |  26<H>  ----------------------------<  123<H>  3.9   |  27  |  1.02    Ca    7.3<L>      12 Jul 2023 03:48  Phos  3.6     07-12  Mg     1.9     07-12    TPro  6.7  /  Alb  2.5<L>  /  TBili  2.8<H>  /  DBili  x   /  AST  165<H>  /  ALT  99<H>  /  AlkPhos  103  07-11      Urinalysis Basic - ( 12 Jul 2023 03:48 )    Color: x / Appearance: x / SG: x / pH: x  Gluc: 123 mg/dL / Ketone: x  / Bili: x / Urobili: x   Blood: x / Protein: x / Nitrite: x   Leuk Esterase: x / RBC: x / WBC x   Sq Epi: x / Non Sq Epi: x / Bacteria: x      CAPILLARY BLOOD GLUCOSE      POCT Blood Glucose.: 121 mg/dL (12 Jul 2023 11:02)  POCT Blood Glucose.: 116 mg/dL (12 Jul 2023 05:53)  POCT Blood Glucose.: 115 mg/dL (11 Jul 2023 23:45)  POCT Blood Glucose.: 117 mg/dL (11 Jul 2023 19:57)    LIVER FUNCTIONS - ( 11 Jul 2023 05:30 )  Alb: 2.5 g/dL / Pro: 6.7 g/dL / ALK PHOS: 103 U/L / ALT: 99 U/L / AST: 165 U/L / GGT: x             Cultures:    RADIOLOGY & ADDITIONAL STUDIES:

## 2023-07-12 NOTE — PROGRESS NOTE ADULT - ASSESSMENT
78M POD15 laparoscopic lysis of adhesions, converted to open lysis of adhesions, SBR x 3, temporary abdominal closure, POD14 ex lap, removal of abthera, ileocolic resection, small bowel anastomosis c/b anastomotic leak resulting in wound dehiscence now POD7 RTOR exlap, washout, ileocolic resection with end ileostomy, blow hole colostomy, fistula, red rubber from ileostomy to small bowel anastomosis; vicryl bridging mesh; R JOYCE below ileostomy, L JOYCE at small bowel enterotomy repair.    Plan:  - Continue dressing change TID  - Abdominal binder at all times  - Remove central line  - TPN through PICC line   - LE duplex   - Rest of care per SICU

## 2023-07-12 NOTE — PROGRESS NOTE ADULT - SUBJECTIVE AND OBJECTIVE BOX
INTERVAL HPI/OVERNIGHT EVENTS: Dressing change, Given 40 mg IVP Lasix. Net Neg 400    STATUS POST:    6/27: Laparoscopic lysis of adhesions, converted to open lysis of adhesions, SBR x 3, temporary abdominal closure, 5L cryst, 1L 5% alb, 3 pRBC, 1 FFP, 1 plts  6/28: ex lap, removal of abthera, ileocolic resection, small bowel anastamosis, , 1.6 crystalloid, 250 5%, 175 uop   7/3: IR Gendel drained perihepatic aspiration of serous fluid.   7/5: RTOR exlap, washout, ileocolic resection with end ileostomy, blow hole colostomy, fistula, red rubber from ileostomy to small bowel anastomosis; vicryl bridging mesh; R JOYCE below ileostomy, L JOYCE at small bowel enterotomy repair; 500 LR, 500 5% albumin, 3u PRBC, 2 FFP, 400 UOP,     SUBJECTIVE: Patient seen and examined at bedside. In good spirits, denies abdominal pain, n/v, cp, sob.       heparin   Injectable 5000 Unit(s) SubCutaneous every 8 hours  losartan 25 milliGRAM(s) Oral daily  metoprolol tartrate 25 milliGRAM(s) Oral every 12 hours      Vital Signs Last 24 Hrs  T(C): 37.1 (12 Jul 2023 09:00), Max: 37.1 (11 Jul 2023 21:55)  T(F): 98.8 (12 Jul 2023 09:00), Max: 98.8 (12 Jul 2023 09:00)  HR: 60 (12 Jul 2023 11:00) (55 - 67)  BP: 180/79 (12 Jul 2023 11:00) (138/67 - 185/82)  BP(mean): 11 (12 Jul 2023 11:00) (11 - 118)  RR: 21 (12 Jul 2023 11:00) (16 - 28)  SpO2: 94% (12 Jul 2023 11:00) (93% - 97%)    Parameters below as of 12 Jul 2023 11:00  Patient On (Oxygen Delivery Method): room air      I&O's Detail    11 Jul 2023 07:01  -  12 Jul 2023 07:00  --------------------------------------------------------  IN:    Amiodarone: 400.8 mL    Fat Emulsion (Fish Oil &amp; Plant Based) 20% Infusion: 270.4 mL    IV PiggyBack: 250 mL    TPN (Total Parenteral Nutrition): 1992 mL  Total IN: 2913.2 mL    OUT:    Bulb (mL): 10 mL    Bulb (mL): 240 mL    Fat Emulsion (Fish Oil &amp; Plant Based) 20% Infusion: 0 mL    Ileostomy (mL): 0 mL    NiCARdipine: 0 mL    Voided (mL): 3750 mL  Total OUT: 4000 mL    Total NET: -1086.8 mL      12 Jul 2023 07:01  -  12 Jul 2023 11:55  --------------------------------------------------------  IN:    Amiodarone: 33.4 mL    Oral Fluid: 60 mL    TPN (Total Parenteral Nutrition): 415 mL  Total IN: 508.4 mL    OUT:    Bulb (mL): 0 mL    Bulb (mL): 0 mL    Ileostomy (mL): 0 mL    Voided (mL): 750 mL  Total OUT: 750 mL    Total NET: -241.6 mL          General: NAD, resting comfortably in bed  C/V: NSR  Pulm: Nonlabored breathing, no respiratory distress  Abd: soft, nondistended, dressing taken down, bridging mesh covered in Xeroform now w/ bilious drainage in the wound bed, ileostomy ppp w/ bilious output in pouch, mucous fistula pink and vitalized. Packed wet to dry. R JOYCE SS output, L JOYCE bilious.  Extrem: WWP, no edema, SCDs in place      LABS:                        9.7    9.25  )-----------( 476      ( 12 Jul 2023 03:48 )             30.0     07-12    132<L>  |  99  |  26<H>  ----------------------------<  123<H>  3.9   |  27  |  1.02    Ca    7.3<L>      12 Jul 2023 03:48  Phos  3.6     07-12  Mg     1.9     07-12    TPro  6.7  /  Alb  2.5<L>  /  TBili  2.8<H>  /  DBili  x   /  AST  165<H>  /  ALT  99<H>  /  AlkPhos  103  07-11      Urinalysis Basic - ( 12 Jul 2023 03:48 )    Color: x / Appearance: x / SG: x / pH: x  Gluc: 123 mg/dL / Ketone: x  / Bili: x / Urobili: x   Blood: x / Protein: x / Nitrite: x   Leuk Esterase: x / RBC: x / WBC x   Sq Epi: x / Non Sq Epi: x / Bacteria: x        RADIOLOGY & ADDITIONAL STUDIES:

## 2023-07-13 NOTE — ADVANCED PRACTICE NURSE CONSULT - ASSESSMENT
New 1 3/4" [place] 2 piece appliance placed with patient's spouse present. Stoma flush, slightly dusky, with some non-viable tissue present. Sanguinous drainage in old pouch. Reviewed previous teaching, discussed bathing with and without ostomy appliance.

## 2023-07-13 NOTE — PROGRESS NOTE ADULT - SUBJECTIVE AND OBJECTIVE BOX
ON: Negative 1.4 L @10pm; dressing changed; 24 hr urea nitrogen 16 - normal; Given additional Melatonin. Negative 1.2L @ 4am. WBC inc to 11 - Rectal temp ordered.       SUBJECTIVE: Pt seen and examined at bedside this am by ICU team. Patient is lying in chair comfortably. No acute complaints. Reports he is tolerating clear liquids. Reports pain is controlled.      MEDICATIONS  (STANDING):  aMIOdarone    Tablet 200 milliGRAM(s) Oral daily  atorvastatin 20 milliGRAM(s) Oral at bedtime  chlorhexidine 2% Cloths 1 Application(s) Topical <User Schedule>  dextrose 5%. 1000 milliLiter(s) (50 mL/Hr) IV Continuous <Continuous>  dextrose 5%. 1000 milliLiter(s) (100 mL/Hr) IV Continuous <Continuous>  dextrose 50% Injectable 25 Gram(s) IV Push once  dextrose 50% Injectable 12.5 Gram(s) IV Push once  dextrose 50% Injectable 25 Gram(s) IV Push once  glucagon  Injectable 1 milliGRAM(s) IntraMuscular once  heparin   Injectable 5000 Unit(s) SubCutaneous every 8 hours  insulin lispro (ADMELOG) corrective regimen sliding scale   SubCutaneous every 6 hours  levothyroxine 50 MICROGram(s) Oral daily  lipid, fat emulsion (Fish Oil and Plant Based) 20% Infusion 0.5 Gm/kG/Day (20.83 mL/Hr) IV Continuous <Continuous>  losartan 25 milliGRAM(s) Oral daily  metoprolol tartrate 25 milliGRAM(s) Oral every 12 hours  octreotide  Injectable 100 MICROGram(s) IV Push every 8 hours  pantoprazole  Injectable 40 milliGRAM(s) IV Push daily  Parenteral Nutrition - Adult 1 Each (83 mL/Hr) TPN Continuous <Continuous>  Parenteral Nutrition - Adult 1 Each (83 mL/Hr) TPN Continuous <Continuous>  venlafaxine XR. 300 milliGRAM(s) Oral daily    MEDICATIONS  (PRN):  acetaminophen     Tablet .. 650 milliGRAM(s) Oral every 6 hours PRN Mild Pain (1 - 3)  dextrose Oral Gel 15 Gram(s) Oral once PRN Blood Glucose LESS THAN 70 milliGRAM(s)/deciliter  hydrALAZINE Injectable 10 milliGRAM(s) IV Push every 6 hours PRN SBP > 160  melatonin 5 milliGRAM(s) Oral at bedtime PRN Sleep      Drips: N/A    ICU Vital Signs Last 24 Hrs  T(C): 37.7 (13 Jul 2023 09:00), Max: 37.7 (13 Jul 2023 07:00)  T(F): 99.8 (13 Jul 2023 09:00), Max: 99.8 (13 Jul 2023 07:00)  HR: 64 (13 Jul 2023 12:00) (59 - 72)  BP: 146/107 (13 Jul 2023 12:00) (133/60 - 188/80)  BP(mean): 122 (13 Jul 2023 12:00) (86 - 140)  ABP: --  ABP(mean): --  RR: 22 (13 Jul 2023 12:00) (19 - 34)  SpO2: 93% (13 Jul 2023 12:00) (89% - 98%)    O2 Parameters below as of 13 Jul 2023 12:00  Patient On (Oxygen Delivery Method): room air            Physical Exam:  General: NAD  HEENT: NC/AT, EOMI, PERRLA, normal hearing, no oral lesions, neck supple w/o LAD  Pulmonary: Nonlabored breathing, no respiratory distress, CTA-B  Cardiovascular: NSR, no murmurs  Abdominal: soft, NT/ND. Midline wound open with bridging vicryl mesh - bile stained. Ostomy in RUQ patent with dark output. JOYCE x2 noted - L to be bilious to LIWS, R SS  Extremities: WWP, 5/5 strength x 4, 1-2+ edema noted in LE to level of mid calf  Neuro: A/O x3, CNs II-XII grossly intact, normal motor/sensation, no focal deficits  Pulses: palpable distal pulses      I&O's Summary    12 Jul 2023 07:01  -  13 Jul 2023 07:00  --------------------------------------------------------  IN: 3195.8 mL / OUT: 4545 mL / NET: -1349.2 mL    13 Jul 2023 07:01  -  13 Jul 2023 12:45  --------------------------------------------------------  IN: 415 mL / OUT: 650 mL / NET: -235 mL        LABS:                        10.7   11.19 )-----------( 440      ( 13 Jul 2023 05:00 )             33.0     07-13    133<L>  |  96  |  28<H>  ----------------------------<  124<H>  4.0   |  27  |  1.11    Ca    7.8<L>      13 Jul 2023 05:00  Phos  3.5     07-13  Mg     2.0     07-13        Urinalysis Basic - ( 13 Jul 2023 05:00 )    Color: x / Appearance: x / SG: x / pH: x  Gluc: 124 mg/dL / Ketone: x  / Bili: x / Urobili: x   Blood: x / Protein: x / Nitrite: x   Leuk Esterase: x / RBC: x / WBC x   Sq Epi: x / Non Sq Epi: x / Bacteria: x      CAPILLARY BLOOD GLUCOSE      POCT Blood Glucose.: 130 mg/dL (13 Jul 2023 11:23)  POCT Blood Glucose.: 106 mg/dL (13 Jul 2023 07:18)  POCT Blood Glucose.: 114 mg/dL (13 Jul 2023 00:13)  POCT Blood Glucose.: 117 mg/dL (12 Jul 2023 17:23)        Cultures:    RADIOLOGY & ADDITIONAL STUDIES:

## 2023-07-13 NOTE — CHART NOTE - NSCHARTNOTEFT_GEN_A_CORE
Admitting Diagnosis:   Patient is a 78y old  Male who presents with a chief complaint of SBO (13 Jul 2023 13:16)      PAST MEDICAL & SURGICAL HISTORY:  Essential hypertension  Hypertension      Atrial fibrillation  s/p cardioversion 2010 and 2014  Pt. reports 4 DCCV  Now on Amiodarone 200mg PO bid and Eliquis 5mg PO bid  Last DCCV 4 yrs ago at [facility]      Crohn's disease  s/p partial resection of ileum      Hyperlipidemia      Hypothyroidism      History of depression  On Venlafaxine ER 150mg PO bid      H/O knee surgery      History of cataract surgery          Current Nutrition Order:  TPN & PO  TPN- 123g AA + 455g dextrose + 250ml SMOFlipids  Clear liquid diet initiated 7/12    PO Intake: Good (%) [   ]  Fair (50-75%) [   ] Poor (<25%) [   ]    GI Issues: ileostomy, dark red stool noted today    Pain: no pain/absence of nonverbal indicators of pain    Skin Integrity: midline sx incision to abdomen, stage II PU to L glute, JOYCE drain to L & R abdomen. 1+ edema to LE to mid calf    Labs:   07-13    133<L>  |  96  |  28<H>  ----------------------------<  124<H>  4.0   |  27  |  1.11    Ca    7.8<L>      13 Jul 2023 05:00  Phos  3.5     07-13  Mg     2.0     07-13      CAPILLARY BLOOD GLUCOSE      POCT Blood Glucose.: 130 mg/dL (13 Jul 2023 11:23)  POCT Blood Glucose.: 106 mg/dL (13 Jul 2023 07:18)  POCT Blood Glucose.: 114 mg/dL (13 Jul 2023 00:13)  POCT Blood Glucose.: 117 mg/dL (12 Jul 2023 17:23)      Medications:  MEDICATIONS  (STANDING):  aMIOdarone    Tablet 200 milliGRAM(s) Oral daily  atorvastatin 20 milliGRAM(s) Oral at bedtime  chlorhexidine 2% Cloths 1 Application(s) Topical <User Schedule>  dextrose 5%. 1000 milliLiter(s) (100 mL/Hr) IV Continuous <Continuous>  dextrose 5%. 1000 milliLiter(s) (50 mL/Hr) IV Continuous <Continuous>  dextrose 50% Injectable 25 Gram(s) IV Push once  dextrose 50% Injectable 12.5 Gram(s) IV Push once  dextrose 50% Injectable 25 Gram(s) IV Push once  glucagon  Injectable 1 milliGRAM(s) IntraMuscular once  heparin   Injectable 5000 Unit(s) SubCutaneous every 8 hours  insulin lispro (ADMELOG) corrective regimen sliding scale   SubCutaneous every 6 hours  levothyroxine 50 MICROGram(s) Oral daily  lipid, fat emulsion (Fish Oil and Plant Based) 20% Infusion 0.5 Gm/kG/Day (20.83 mL/Hr) IV Continuous <Continuous>  losartan 25 milliGRAM(s) Oral daily  metoprolol tartrate 25 milliGRAM(s) Oral every 12 hours  octreotide  Injectable 100 MICROGram(s) IV Push every 8 hours  pantoprazole  Injectable 40 milliGRAM(s) IV Push daily  Parenteral Nutrition - Adult 1 Each (83 mL/Hr) TPN Continuous <Continuous>  Parenteral Nutrition - Adult 1 Each (83 mL/Hr) TPN Continuous <Continuous>  venlafaxine XR. 300 milliGRAM(s) Oral daily    MEDICATIONS  (PRN):  acetaminophen     Tablet .. 650 milliGRAM(s) Oral every 6 hours PRN Mild Pain (1 - 3)  dextrose Oral Gel 15 Gram(s) Oral once PRN Blood Glucose LESS THAN 70 milliGRAM(s)/deciliter  hydrALAZINE Injectable 10 milliGRAM(s) IV Push every 6 hours PRN SBP > 160  melatonin 5 milliGRAM(s) Oral at bedtime PRN Sleep      Weight: 101kg [5 July 2023]  Daily   99.9kg [9 July 2023]  Daily 102.1kg [10 July 2023]    Weight Change: Weight gain ? 2/2 fluid shifts, edema. Recommend daily weights for trending.     IBW: 86.4kg/190lbs  %IBW: 118.2%    Estimated energy needs:   Ideal body weight (86.4kg) used for calculations as pt >100% of IBW, BMI <30 per [facility] Standards of Care. Needs estimated for age and adjusted for current clinical status, increased needs for post-op & open abd wound healing    Calories: 6861-0650 kcals based on 30-35 kcals/kg  Protein: 129.6-172.8g based on 1.5-2g protein/kg   *Fluid needs per team    Subjective:   78M w/ Crohn's, AFib/Flutter s/p DCCVs on amiodarone, remote ileocectomy and open appy here for SBO vs Crohn’s flare, s/p NGT decompression and now s/p lap TRE converted to open TRE, SBR x 3, left in discontinuity with abthera vac on 6/27, RTOR for ileocolic resection, small bowel anastomosis, and abdominal wall closure on 6/28, and s/p IR aspiration of perihepatic fluid on 7/3, stepped down to telemetery on 7/4. wound dehiscence on 7/5, RTOR 7/5 for exlap, washout.    Chart reviewed. Pt seen with IDT during AM rounds. TMax: 37.7 C. HR WNL, BP WNL to high. MAPs >65 mmHg. TPN remains primary means to nutrition, clear liquid diet initiated 7/12 & pt tolerating. 24hr urea nitrogen collected 7/11-7/12: urea nitrogen calculation of 16g, total volume 4325ml. Est. nitrogen balance of -0.32g nitrogen [-2g protein losses per day]. I&Os x 24hrs: 3195.8 / 4545 = -1349.2ml net. TPN order placed for tonight. Labs & meds reviewed. RDN will continue to monitor, reassess, and intervene as appropriate.     Previous Nutrition Diagnosis: Increased Nutrient Needs R/T physiological demands for nutrient AEB post-op wound healing    Active [  X ]  Resolved [   ]    If resolved, new PES:     Goal:  Pt will meet at least 75% of protein & energy needs via most appropriate route for nutrition     Recommendations:  1. c/w TPN- consider change to 146g AA, 427g dextrose  - c/w 250ml SMOFlipids daily  - to provide 2535.8kcals, 29.3 kcals/kg, 1.68g protein/kg, GIR 2.90  2. Monitor GI tract viability  - advance diet as tolerated --> low fat, low fiber  - initiate TPN taper when PO intake meeting >60% estimated needs  3. Closely monitor clinical status  4. Weekly lipid panel   - hold if TG >400  5. Vitamins/minerals per team  - consider 1000 mg Vit C & zinc supplementation  6. Daily BMP/Mg/Phos, POC BG Q4-6hrs  7. Monitor chemistry, GI tract, skin integrity    Education: deferred     Risk Level: High [ X  ] Moderate [   ] Low [   ]

## 2023-07-13 NOTE — PROGRESS NOTE ADULT - SUBJECTIVE AND OBJECTIVE BOX
Awake and alert OOB in chair Tolerating fluids VS stable Afeb In RR Clear chest Edema markedly decreased  WBCs up slightly

## 2023-07-13 NOTE — PROGRESS NOTE ADULT - SUBJECTIVE AND OBJECTIVE BOX
Subjective: Patient is lying comfortably in chair without acute complaints. Tolerating clears.       MEDICATIONS  (STANDING):  aMIOdarone    Tablet 200 milliGRAM(s) Oral daily  atorvastatin 20 milliGRAM(s) Oral at bedtime  benzocaine/menthol Lozenge 1 Lozenge Oral once  chlorhexidine 2% Cloths 1 Application(s) Topical <User Schedule>  dextrose 5%. 1000 milliLiter(s) (100 mL/Hr) IV Continuous <Continuous>  dextrose 5%. 1000 milliLiter(s) (50 mL/Hr) IV Continuous <Continuous>  dextrose 50% Injectable 25 Gram(s) IV Push once  dextrose 50% Injectable 12.5 Gram(s) IV Push once  dextrose 50% Injectable 25 Gram(s) IV Push once  glucagon  Injectable 1 milliGRAM(s) IntraMuscular once  heparin   Injectable 5000 Unit(s) SubCutaneous every 8 hours  insulin lispro (ADMELOG) corrective regimen sliding scale   SubCutaneous every 6 hours  levothyroxine Injectable 40 MICROGram(s) IV Push <User Schedule>  lipid, fat emulsion (Fish Oil and Plant Based) 20% Infusion 0.5 Gm/kG/Day (20.83 mL/Hr) IV Continuous <Continuous>  losartan 25 milliGRAM(s) Oral daily  metoprolol tartrate 25 milliGRAM(s) Oral every 12 hours  octreotide  Injectable 100 MICROGram(s) IV Push every 8 hours  pantoprazole  Injectable 40 milliGRAM(s) IV Push daily  Parenteral Nutrition - Adult 1 Each (83 mL/Hr) TPN Continuous <Continuous>  venlafaxine XR. 150 milliGRAM(s) Oral daily    MEDICATIONS  (PRN):  benzocaine/menthol Lozenge 1 Lozenge Oral three times a day PRN Sore Throat  dextrose Oral Gel 15 Gram(s) Oral once PRN Blood Glucose LESS THAN 70 milliGRAM(s)/deciliter  hydrALAZINE Injectable 10 milliGRAM(s) IV Push every 6 hours PRN SBP > 160  HYDROmorphone  Injectable 0.5 milliGRAM(s) IV Push every 4 hours PRN Severe Pain (7 - 10)  melatonin 5 milliGRAM(s) Oral at bedtime PRN Sleep      Vital Signs Last 24 Hrs  T(C): 36.9 (13 Jul 2023 04:55), Max: 37.1 (12 Jul 2023 09:00)  T(F): 98.5 (13 Jul 2023 04:55), Max: 98.8 (12 Jul 2023 09:00)  HR: 64 (13 Jul 2023 07:00) (59 - 72)  BP: 151/70 (13 Jul 2023 07:00) (133/60 - 188/80)  BP(mean): 101 (13 Jul 2023 07:00) (86 - 140)  RR: 21 (13 Jul 2023 06:00) (19 - 34)  SpO2: 96% (13 Jul 2023 07:00) (89% - 98%)    Parameters below as of 13 Jul 2023 06:00  Patient On (Oxygen Delivery Method): room air      Physical Exam:  General: NAD, resting comfortably in bed  C/V: NSR  Pulm: Nonlabored breathing, no respiratory distress  Abd: soft, nondistended, dressing taken down, bridging mesh covered in Xeroform now w/ bilious drainage in the wound bed, ileostomy ppp w/ bilious output in pouch, mucous fistula pink and vitalized. Packed wet to dry. R JOYCE SS output, L JOYCE bilious.  Extrem: WWP, no edema, SCDs in place            I&O's Detail    12 Jul 2023 07:01  -  13 Jul 2023 07:00  --------------------------------------------------------  IN:    Amiodarone: 33.4 mL    Fat Emulsion (Fish Oil &amp; Plant Based) 20% Infusion: 270.4 mL    Oral Fluid: 900 mL    TPN (Total Parenteral Nutrition): 1909 mL  Total IN: 3112.8 mL    OUT:    Bulb (mL): 5 mL    Bulb (mL): 150 mL    Ileostomy (mL): 5 mL    Voided (mL): 3975 mL  Total OUT: 4135 mL    Total NET: -1022.2 mL          LABS:                        10.7   11.19 )-----------( 440      ( 13 Jul 2023 05:00 )             33.0     07-13    133<L>  |  96  |  28<H>  ----------------------------<  124<H>  4.0   |  27  |  1.11    Ca    7.8<L>      13 Jul 2023 05:00  Phos  3.5     07-13  Mg     2.0     07-13        Urinalysis Basic - ( 13 Jul 2023 05:00 )    Color: x / Appearance: x / SG: x / pH: x  Gluc: 124 mg/dL / Ketone: x  / Bili: x / Urobili: x   Blood: x / Protein: x / Nitrite: x   Leuk Esterase: x / RBC: x / WBC x   Sq Epi: x / Non Sq Epi: x / Bacteria: x

## 2023-07-13 NOTE — PROGRESS NOTE ADULT - SUBJECTIVE AND OBJECTIVE BOX
having lower extremity doppler done  no complaints  tolerating clears  no nausea, no vomiting  denies pain  ostomy ok  JPs  Rxs per  surgical team  wbc up sl 11k  antibiotics per ID

## 2023-07-13 NOTE — PROGRESS NOTE ADULT - ATTENDING COMMENTS
AF, Crohn's, s/p SBR with dehiscence, washout, blowhole colostomy, ileostomy  physical as above  continue amiodarone  TPN written  hold diuresis today with higher BUN and creatinine  amiodarone, metoprolol and losartan, follow BP  follow off antibiotics  doppler LEs  culture blood with slight increase in WBC  decision making of high complexity

## 2023-07-13 NOTE — PROGRESS NOTE ADULT - ASSESSMENT
78M POD15 laparoscopic lysis of adhesions, converted to open lysis of adhesions, SBR x 3, temporary abdominal closure, POD14 ex lap, removal of abthera, ileocolic resection, small bowel anastomosis c/b anastomotic leak resulting in wound dehiscence now POD7 RTOR exlap, washout, ileocolic resection with end ileostomy, blow hole colostomy, fistula, red rubber from ileostomy to small bowel anastomosis (removed); vicryl bridging mesh; R JOYCE below ileostomy, L JOYCE at small bowel enterotomy repair. Tolerating clears. Wound with increased bilious drainage and up trending WBC     Plan:  - Repeat CBC at noon   - Reconsult ID for up trending WBC  - Continue dressing change TID  - Abdominal binder at all times   - LE duplex   - Rest of care per SICU  - Team 1 will follow

## 2023-07-13 NOTE — PROGRESS NOTE ADULT - ASSESSMENT
78M w/ Crohn's, AFib/Flutter s/p DCCVs on amiodarone, remote ileocectomy and open appy here for SBO vs Crohns flare, s/p NGT decompression and now s/p lap TRE converted to open TRE, SBR x 3, left in discontinuity with abthera vac on 6/27, RTOR for ileocolic resection, small bowel anastomosis, and abdominal wall closure on 6/28, and s/p IR aspiration of perihepatic fluid on 7/3, stepped down to telemetery on 7/4. wound dehisence on 7/5, RTOR exlap, washout, ileocolic resection with end ileostomy, blow hole colostomy, red rubber from ileostomy to small bowel anastomosis; vicryl bridging mesh on 7/5. Transferred to SICU post op for HD monitoring. Extubated 7/6.    Neuro: Tylenol PRN for pain. Hx anxiety: Continue venlafaxine  CV: Maitain MAP > 65. Hx of Afib/flutter: s/p DCCV, on PO amiodarone, metoprolol. Hx of HTN- continue home losartan and continue hydralazine IV PRN for SBOP < 160. Hx of HLD: continue home rosuvastatin; TTE (04/22) - PASP 50mmHg, EF 69%. Holding diuresis today with uptrending BUN/Cr  Pulm: Extubated 7/6 - now on RA saturating well. s/p Chest tube by CT surg (6/27--7/3)   GI: CLD. TPN/lipids, wound dehisense -- RTOR on 7/5 for exlap, washout, ileocolic resection with end ileostomy, blow hole colostomy, red rubber from ileostomy to small bowel anastomosis; vicryl bridging mesh - continue TID dressing changes, Cont Octreotide.   : Voids. BUN/Cr uptrending.  +1 nitrogen balance  ID: Perforated viscus, IR Cx 7/3: C. tertium, Lactobacillis. OR Cx 7/5 - rare yeast - Imipenem (6/30-7/12), Fluconazole (6/30 -7/12) OR Cx 6/28: Vanc-resistant/amp-sens (but allergic) enterococcus s/p CTX (6/26-6/30), Flagyl (6/26-6/30), Dapto (6/30-7/5). WBC 11K today. Will monitor, draw BCx, send RVP.  Endo: ISS; Hx of hypothyroid: cont synthroid IV. Gout: holding home allopurionol  PPx: SCD, HSQ. Pending b/l LE duplex today.  Lines: PIV x 2, RUE PICC (6/30--), [place] (7/8-7/10) // DC: R Chest tube for iatrogenic PTX (6/27--7/3), R TLC (06/26-30). New R TLC (7/5 -7/12)  Wounds/Drains: midline incision; xeroform and WTD Kerlix, tid dressing changes; R JOYCE below ileostomy, L JOYCE at small bowel enterotomy repair  PT/OT: not ordered  Dispo: SICU

## 2023-07-13 NOTE — PROGRESS NOTE ADULT - SUBJECTIVE AND OBJECTIVE BOX
INTERVAL HPI/OVERNIGHT EVENTS: Negative 1.4 L @10pm; dressing changed; 24 hr urea nitrogen 16 - normal; Given additional Melatonin. Negative 1.2L @ 4am. WBC inc to 11 - Rectal temp ordered.     STATUS POST:    6/27: Laparoscopic lysis of adhesions, converted to open lysis of adhesions, SBR x 3, temporary abdominal closure, 5L cryst, 1L 5% alb, 3 pRBC, 1 FFP, 1 plts  6/28: ex lap, removal of abthera, ileocolic resection, small bowel anastamosis, , 1.6 crystalloid, 250 5%, 175 uop   7/3: IR Gendel drained perihepatic aspiration of serous fluid.   7/5: RTOR exlap, washout, ileocolic resection with end ileostomy, blow hole colostomy, fistula, red rubber from ileostomy to small bowel anastomosis; vicryl bridging mesh; R JOYCE below ileostomy, L JOYCE at small bowel enterotomy repair; 500 LR, 500 5% albumin, 3u PRBC, 2 FFP, 400 UOP,     SUBJECTIVE: Patient seen and examined at bedside. In good spirits, denies n/v, cp, sob. OOBTC.      aMIOdarone    Tablet 200 milliGRAM(s) Oral daily  heparin   Injectable 5000 Unit(s) SubCutaneous every 8 hours  hydrALAZINE Injectable 10 milliGRAM(s) IV Push every 6 hours PRN  losartan 25 milliGRAM(s) Oral daily  metoprolol tartrate 25 milliGRAM(s) Oral every 12 hours      Vital Signs Last 24 Hrs  T(C): 37.1 (13 Jul 2023 13:00), Max: 37.7 (13 Jul 2023 07:00)  T(F): 98.8 (13 Jul 2023 13:00), Max: 99.8 (13 Jul 2023 07:00)  HR: 62 (13 Jul 2023 15:15) (58 - 72)  BP: 156/68 (13 Jul 2023 15:15) (133/60 - 188/80)  BP(mean): 98 (13 Jul 2023 15:15) (86 - 140)  RR: 18 (13 Jul 2023 15:00) (18 - 33)  SpO2: 96% (13 Jul 2023 15:15) (89% - 98%)    Parameters below as of 13 Jul 2023 15:15  Patient On (Oxygen Delivery Method): room air      I&O's Detail    12 Jul 2023 07:01  -  13 Jul 2023 07:00  --------------------------------------------------------  IN:    Amiodarone: 33.4 mL    Fat Emulsion (Fish Oil &amp; Plant Based) 20% Infusion: 270.4 mL    Oral Fluid: 900 mL    TPN (Total Parenteral Nutrition): 1992 mL  Total IN: 3195.8 mL    OUT:    Bulb (mL): 350 mL    Bulb (mL): 10 mL    Ileostomy (mL): 10 mL    Voided (mL): 4175 mL  Total OUT: 4545 mL    Total NET: -1349.2 mL      13 Jul 2023 07:01  -  13 Jul 2023 16:14  --------------------------------------------------------  IN:    TPN (Total Parenteral Nutrition): 664 mL  Total IN: 664 mL    OUT:    Fat Emulsion (Fish Oil &amp; Plant Based) 20% Infusion: 0 mL    Fat Emulsion (Fish Oil &amp; Plant Based) 20% Infusion: 0 mL    Voided (mL): 650 mL  Total OUT: 650 mL    Total NET: 14 mL          General: NAD, resting comfortably in bed  C/V: NSR  Pulm: Nonlabored breathing, no respiratory distress  Abd: soft, nondistended, dressing taken down, bridging mesh covered in Xeroform now w/ bilious drainage in the wound bed, ileostomy ppp w/ bilious output in pouch, mucous fistula pink and vitalized. Packed wet to dry. R JOYCE SS output, L JOYCE bilious.  Extrem: WWP, no edema, SCDs in place      LABS:                        10.7   11.19 )-----------( 440      ( 13 Jul 2023 05:00 )             33.0     07-13    133<L>  |  96  |  28<H>  ----------------------------<  124<H>  4.0   |  27  |  1.11    Ca    7.8<L>      13 Jul 2023 05:00  Phos  3.5     07-13  Mg     2.0     07-13        Urinalysis Basic - ( 13 Jul 2023 05:00 )    Color: x / Appearance: x / SG: x / pH: x  Gluc: 124 mg/dL / Ketone: x  / Bili: x / Urobili: x   Blood: x / Protein: x / Nitrite: x   Leuk Esterase: x / RBC: x / WBC x   Sq Epi: x / Non Sq Epi: x / Bacteria: x        RADIOLOGY & ADDITIONAL STUDIES:

## 2023-07-13 NOTE — PROGRESS NOTE ADULT - ASSESSMENT
78M POD16 laparoscopic lysis of adhesions, converted to open lysis of adhesions, SBR x 3, temporary abdominal closure, POD15 ex lap, removal of abthera, ileocolic resection, small bowel anastomosis c/b anastomotic leak resulting in wound dehiscence now POD8 RTOR exlap, washout, ileocolic resection with end ileostomy, blow hole colostomy, fistula, red rubber from ileostomy to small bowel anastomosis; vicryl bridging mesh; R JOYCE below ileostomy, L JOYCE at small bowel enterotomy repair.    Plan:  - Continue dressing change TID  - Recommend against SDU given patient's high nursing care needs  - Care per SICU

## 2023-07-14 NOTE — CHART NOTE - NSCHARTNOTEFT_GEN_A_CORE
Patient underwent wound wash and irrigation at bedside by Dr Peterson and Dr Knapp. Vicryl mesh was fenestrated, biliary content aspirated, and cavity irrigated extensively with NS, patient tolerated the procedure well at bed side. White foam placed, followed by black foam top wound vac placed to suction.     Plan   - Wound vac change every 2 days, next wound vac on Sunday  - In case patient becomes septic wound vac should be taken down and place wet to dry dressing

## 2023-07-14 NOTE — SWALLOW BEDSIDE ASSESSMENT ADULT - SWALLOW EVAL: DIAGNOSIS
Subtle and inconsistent s/s of penetration/aspiration of liquids vs secretions. Pt is at an increased risk for aspiration and its negative sequela given complicated surgical hx and reduced mobility. However, in the context of currently stable respiratory status, clear chest radiography, intact cognition, and no concerns for laryngeal dysfunction s/p extubation, pt appears safe to resume a diet with strict aspiration precautions. Our svc will f/u for reassessment within ~24-48 hours for reassessment to further assess diet tolerance.

## 2023-07-14 NOTE — PROGRESS NOTE ADULT - ATTENDING COMMENTS
AF, HTN, Crohn's, s/p SBR with SB anastomosis, ileocolic resection, wound dehiscence with ileocolic resection, end ileostomy, blowhole colostomy  physical as above  continue amiodarone  continue metoprolol and losartan  continue diuresis  TPN  clear liquids  follow off antibiotics  replace vac today  decision making of high complexity

## 2023-07-14 NOTE — PROGRESS NOTE ADULT - ASSESSMENT
vac per surgical team  diet per surgical team  rediscuss path with patient  might benefit from maintenance therapy for IBD

## 2023-07-14 NOTE — PROGRESS NOTE ADULT - SUBJECTIVE AND OBJECTIVE BOX
INTERVAL HPI/OVERNIGHT EVENTS: Urine lytes: FeUrea: Intrinsic. UA: Neg for inf + Casts. Pt eating ice cream -therefore  reminded of NPO status. dressing changed. Net pos 425@5am    STATUS POST:  6/27: Laparoscopic lysis of adhesions, converted to open lysis of adhesions, SBR x 3, temporary abdominal closure, 5L cryst, 1L 5% alb, 3 pRBC, 1 FFP, 1 plts  6/28: ex lap, removal of abthera, ileocolic resection, small bowel anastamosis, , 1.6 crystalloid, 250 5%, 175 uop   7/3: IR Gendel drained perihepatic aspiration of serous fluid.   7/5: RTOR exlap, washout, ileocolic resection with end ileostomy, blow hole colostomy, fistula, red rubber from ileostomy to small bowel anastomosis; vicryl bridging mesh; R JOYCE below ileostomy, L JOYCE at small bowel enterotomy repair; 500 LR, 500 5% albumin, 3u PRBC, 2 FFP, 400 UOP,     SUBJECTIVE: Patient seen and examined at bedside. In good spirits, denies abdominal pain, n/v, cp, sob. OOBTC.      aMIOdarone    Tablet 200 milliGRAM(s) Oral daily  heparin   Injectable 5000 Unit(s) SubCutaneous every 8 hours  hydrALAZINE Injectable 10 milliGRAM(s) IV Push every 6 hours PRN  losartan 25 milliGRAM(s) Oral daily  metoprolol tartrate 25 milliGRAM(s) Oral every 12 hours      Vital Signs Last 24 Hrs  T(C): 36.4 (14 Jul 2023 04:28), Max: 37.7 (13 Jul 2023 09:00)  T(F): 97.5 (14 Jul 2023 04:28), Max: 99.8 (13 Jul 2023 09:00)  HR: 59 (14 Jul 2023 04:00) (57 - 70)  BP: 149/69 (14 Jul 2023 04:00) (132/67 - 176/77)  BP(mean): 99 (14 Jul 2023 04:00) (87 - 122)  RR: 19 (14 Jul 2023 04:00) (14 - 33)  SpO2: 95% (14 Jul 2023 04:00) (93% - 99%)    Parameters below as of 14 Jul 2023 04:00  Patient On (Oxygen Delivery Method): room air      I&O's Detail    13 Jul 2023 07:01  -  14 Jul 2023 07:00  --------------------------------------------------------  IN:    Fat Emulsion (Fish Oil &amp; Plant Based) 20% Infusion: 187.2 mL    Oral Fluid: 840 mL    TPN (Total Parenteral Nutrition): 1743 mL  Total IN: 2770.2 mL    OUT:    Bulb (mL): 0 mL    Bulb (mL): 50 mL    Fat Emulsion (Fish Oil &amp; Plant Based) 20% Infusion: 0 mL    Ileostomy (mL): 20 mL    Voided (mL): 2375 mL  Total OUT: 2445 mL    Total NET: 325.2 mL          General: NAD, resting comfortably in bed  C/V: NSR  Pulm: Nonlabored breathing, no respiratory distress  Abd: soft, nondistended, dressing taken down, bridging mesh covered in Xeroform now w/ bilious drainage staining the superior aspect of the wound bed, ileostomy ppp w/ bilious output in pouch, mucous fistula pink and vitalized. Packed wet to dry. R JOYCE SS output, L JOYCE bilious to suction.  Extrem: WWP, no edema, SCDs in place        LABS:                        9.4    10.48 )-----------( 387      ( 14 Jul 2023 06:08 )             29.5     07-14    133<L>  |  100  |  20  ----------------------------<  127<H>  4.2   |  27  |  1.02    Ca    7.4<L>      14 Jul 2023 06:08  Phos  3.3     07-14  Mg     2.1     07-14        Urinalysis Basic - ( 14 Jul 2023 06:08 )    Color: x / Appearance: x / SG: x / pH: x  Gluc: 127 mg/dL / Ketone: x  / Bili: x / Urobili: x   Blood: x / Protein: x / Nitrite: x   Leuk Esterase: x / RBC: x / WBC x   Sq Epi: x / Non Sq Epi: x / Bacteria: x        RADIOLOGY & ADDITIONAL STUDIES:

## 2023-07-14 NOTE — PROGRESS NOTE ADULT - ASSESSMENT
78M POD17 laparoscopic lysis of adhesions, converted to open lysis of adhesions, SBR x 3, temporary abdominal closure, POD16 ex lap, removal of abthera, ileocolic resection, small bowel anastomosis c/b anastomotic leak resulting in wound dehiscence now POD9 RTOR exlap, washout, ileocolic resection with end ileostomy, blow hole colostomy, fistula, red rubber from ileostomy to small bowel anastomosis; vicryl bridging mesh; R JOYCE below ileostomy, L JOYCE at small bowel enterotomy repair.    Vac to be done this afternoon at bedside  Please ensure lidocaine at bedside  Rest of care per SICU

## 2023-07-14 NOTE — PROGRESS NOTE ADULT - ASSESSMENT
78M S/P laparoscopic lysis of adhesions, converted to open lysis of adhesions, SBR x 3, temporary abdominal closure, S/P ex lap, removal of abthera, ileocolic resection, small bowel anastomosis c/b anastomotic leak resulting in wound dehiscence now POD8 RTOR exlap, washout, ileocolic resection with end ileostomy, blow hole colostomy, fistula, red rubber from ileostomy to small bowel anastomosis (removed); vicryl bridging mesh; R JOYCE below ileostomy, L JOYCE at small bowel enterotomy repair. Tolerating clears. Wound with increased bilious drainage and up trending WBC. Duplex US negative for DVT 7/13.     Plan:  - Speech and swallow to see  - Possible wound wash out under sedation   - Rest of care per SICU  - Team 1 will follow

## 2023-07-14 NOTE — SWALLOW BEDSIDE ASSESSMENT ADULT - SLP PERTINENT HISTORY OF CURRENT PROBLEM
Hx of Chron's, Afib/flutter, remote ileocecostomy. Hospitalized for SBO vs Chron's flare, s/p  NGT decompression and now s/p lap TRE converted to open TRE, SBR x 3, left in discontinuity with abthera vac on 6/27, RTOR for ileocolic resection, small bowel anastomosis, and abdominal wall closure on 6/28, and s/p IR aspiration of perihepatic fluid on 7/3, wound dehisence on 7/5, RTOR exlap, washout, ileocolic resection with end ileostomy, blow hole colostomy, red rubber from ileostomy to small bowel anastomosis; vicryl bridging mesh on 7/5. Extubated on 7/6.

## 2023-07-14 NOTE — SWALLOW BEDSIDE ASSESSMENT ADULT - SLP GENERAL OBSERVATIONS
Awake, alert, upright in bed, breathing RA, vocal quality WFL. C/o persistent mucus (self-suctioning), denies dysphagic symptoms.

## 2023-07-14 NOTE — PROGRESS NOTE ADULT - ASSESSMENT
78M w/ Crohn's, AFib/Flutter s/p DCCVs on amiodarone, remote ileocectomy and open appy here for SBO vs Crohns flare, s/p NGT decompression and now s/p lap TRE converted to open TRE, SBR x 3, left in discontinuity with abthera vac on 6/27, RTOR for ileocolic resection, small bowel anastomosis, and abdominal wall closure on 6/28, and s/p IR aspiration of perihepatic fluid on 7/3, stepped down to telemetery on 7/4. wound dehisence on 7/5, RTOR exlap, washout, ileocolic resection with end ileostomy, blow hole colostomy, red rubber from ileostomy to small bowel anastomosis; vicryl bridging mesh on 7/5. Transferred to SICU post op for HD monitoring. Extubated 7/6.    Neuro: Tylenol PRN for pain. Hx anxiety: Continue venlafaxine  CV: Maitain MAP > 65. Hx of Afib/flutter: s/p DCCV, on PO amiodarone, metoprolol. Hx of HTN- continue home losartan and continue hydralazine IV PRN for SBOP < 160. Hx of HLD: continue home rosuvastatin; TTE (04/22) - PASP 50mmHg, EF 69%. Diuresis with 40 Lasix today  Pulm: Extubated 7/6 - now on RA saturating well. s/p Chest tube by CT surg (6/27--7/3)   GI: NPO - pending SLP evaluation. TPN/lipids, wound dehisense -- RTOR on 7/5 for exlap, washout, ileocolic resection with end ileostomy, blow hole colostomy, red rubber from ileostomy to small bowel anastomosis; vicryl bridging mesh - continue TID dressing changes, Continue Octreotide.   : Voids. BUN/Cr downtrending today - Ulytes with intrinisic injury pattern.  ID: Perforated viscus, IR Cx 7/3: C. tertium, Lactobacillis. OR Cx 7/5 - rare yeast - Imipenem (6/30-7/12), Fluconazole (6/30 -7/12) OR Cx 6/28: Vanc-resistant/amp-sens (but allergic) enterococcus s/p CTX (6/26-6/30), Flagyl (6/26-6/30), Dapto (6/30-7/5). WBC downtrending today. Monitor BCx  Endo: ISS; Hx of hypothyroid: cont synthroid IV. Gout: holding home allopurionol  PPx: SCD, HSQ. Pending b/l LE duplex today.  Lines: PIV x 2, RUE PICC (6/30--), [place] (7/8-7/10) // DC: R Chest tube for iatrogenic PTX (6/27--7/3), R TLC (06/26-30). New R TLC (7/5 -7/12)  Wounds/Drains: midline incision; xeroform and WTD Kerlix, tid dressing changes; R JOYCE below ileostomy, L JOYCE at small bowel enterotomy repair. Potential for wound vac today  PT/OT: not ordered  Dispo: SICU     78M w/ Crohn's, AFib/Flutter s/p DCCVs on amiodarone, remote ileocectomy and open appy here for SBO vs Crohns flare, s/p NGT decompression and now s/p lap TRE converted to open TRE, SBR x 3, left in discontinuity with abthera vac on 6/27, RTOR for ileocolic resection, small bowel anastomosis, and abdominal wall closure on 6/28, and s/p IR aspiration of perihepatic fluid on 7/3, stepped down to telemetery on 7/4. wound dehisence on 7/5, RTOR exlap, washout, ileocolic resection with end ileostomy, blow hole colostomy, red rubber from ileostomy to small bowel anastomosis; vicryl bridging mesh on 7/5. Transferred to SICU post op for HD monitoring. Extubated 7/6.    Neuro: Tylenol PRN for pain. Hx anxiety: Continue venlafaxine  CV: Maitain MAP > 65. Hx of Afib/flutter: s/p DCCV, on PO amiodarone, metoprolol. Hx of HTN- continue home losartan and continue hydralazine IV PRN for SBOP < 160. Hx of HLD: continue home rosuvastatin; TTE (04/22) - PASP 50mmHg, EF 69%. Diuresis with 40 Lasix today  Pulm: Extubated 7/6 - now on RA saturating well. s/p Chest tube by CT surg (6/27--7/3)   GI: NPO - pending SLP evaluation. TPN/lipids, wound dehisense -- RTOR on 7/5 for exlap, washout, ileocolic resection with end ileostomy, blow hole colostomy, red rubber from ileostomy to small bowel anastomosis; vicryl bridging mesh - continue TID dressing changes, Continue Octreotide.   : Voids. BUN/Cr downtrending today - U[place] with intrinisic injury pattern.  ID: Perforated viscus, IR Cx 7/3: C. tertium, Lactobacillis. OR Cx 7/5 - rare yeast - Imipenem (6/30-7/12), Fluconazole (6/30 -7/12) OR Cx 6/28: Vanc-resistant/amp-sens (but allergic) enterococcus s/p CTX (6/26-6/30), Flagyl (6/26-6/30), Dapto (6/30-7/5). WBC downtrending today. Monitor BCx  Endo: ISS; Hx of hypothyroid: cont synthroid IV. Gout: holding home allopurinol  PPx: SCD, HSQ. Pending b/l LE duplex today.  Lines: PIV x 2, RUE PICC (6/30--), [place] (7/8-7/10) // DC: R Chest tube for iatrogenic PTX (6/27--7/3), R TLC (06/26-30). New R TLC (7/5 -7/12)  Wounds/Drains: midline incision; xeroform and WTD Kerlix, tid dressing changes; R JOYCE below ileostomy, L JOYCE at small bowel enterotomy repair. Potential for wound vac today  PT/OT: not ordered  Dispo: SICU

## 2023-07-14 NOTE — SWALLOW BEDSIDE ASSESSMENT ADULT - NS SPL SWALLOW CLINIC TRIAL FT
Baseline throat clearing/coughing (some of which volitional to clear secretions). Oral phase unremarkable with adequate bolus containment, processing, and clearance. Laryngeal movement palpated; appeared brisk and timely. Intermittent delayed subtle cough/throat clear x3 (2/3 occurring during initial trials), potentially indicative of at least deep penetration of liquid vs secretions. Pt denied globus sensation.

## 2023-07-14 NOTE — PROGRESS NOTE ADULT - SUBJECTIVE AND OBJECTIVE BOX
0.5 of dilaudid given IV  previous Wet to dry dressing removed  vicryl mesh fenestrated  LUQ abscess drained  washout took place with sterile Saline  VAC with white sponge under black sponge placed

## 2023-07-14 NOTE — PROGRESS NOTE ADULT - SUBJECTIVE AND OBJECTIVE BOX
SUBJECTIVE: Pt seen and examined at bedside this am by surgery team. Sitting comfortable in bed. Bile soaked dressing on mid line wound.     MEDICATIONS  (STANDING):  aMIOdarone    Tablet 200 milliGRAM(s) Oral daily  atorvastatin 20 milliGRAM(s) Oral at bedtime  chlorhexidine 2% Cloths 1 Application(s) Topical <User Schedule>  dextrose 5%. 1000 milliLiter(s) (50 mL/Hr) IV Continuous <Continuous>  dextrose 5%. 1000 milliLiter(s) (100 mL/Hr) IV Continuous <Continuous>  dextrose 50% Injectable 25 Gram(s) IV Push once  dextrose 50% Injectable 12.5 Gram(s) IV Push once  dextrose 50% Injectable 25 Gram(s) IV Push once  furosemide   Injectable 40 milliGRAM(s) IV Push once  glucagon  Injectable 1 milliGRAM(s) IntraMuscular once  heparin   Injectable 5000 Unit(s) SubCutaneous every 8 hours  insulin lispro (ADMELOG) corrective regimen sliding scale   SubCutaneous every 6 hours  levothyroxine 50 MICROGram(s) Oral daily  lidocaine 1%/epinephrine 1:100,000 Inj 20 milliLiter(s) Local Injection once  lipid, fat emulsion (Fish Oil and Plant Based) 20% Infusion 0.5 Gm/kG/Day (20.83 mL/Hr) IV Continuous <Continuous>  losartan 25 milliGRAM(s) Oral daily  metoprolol tartrate 25 milliGRAM(s) Oral every 12 hours  octreotide  Injectable 100 MICROGram(s) IV Push every 8 hours  pantoprazole  Injectable 40 milliGRAM(s) IV Push daily  Parenteral Nutrition - Adult 1 Each (83 mL/Hr) TPN Continuous <Continuous>  Parenteral Nutrition - Adult 1 Each (83 mL/Hr) TPN Continuous <Continuous>  venlafaxine XR. 300 milliGRAM(s) Oral daily    MEDICATIONS  (PRN):  acetaminophen     Tablet .. 650 milliGRAM(s) Oral every 6 hours PRN Mild Pain (1 - 3)  benzocaine/menthol Lozenge 1 Lozenge Oral two times a day PRN Mouth Sores  dextrose Oral Gel 15 Gram(s) Oral once PRN Blood Glucose LESS THAN 70 milliGRAM(s)/deciliter  hydrALAZINE Injectable 10 milliGRAM(s) IV Push every 6 hours PRN SBP > 160  melatonin 5 milliGRAM(s) Oral at bedtime PRN Sleep      Vital Signs Last 24 Hrs  T(C): 36.9 (14 Jul 2023 08:00), Max: 37.1 (13 Jul 2023 13:00)  T(F): 98.5 (14 Jul 2023 08:00), Max: 98.8 (13 Jul 2023 13:00)  HR: 61 (14 Jul 2023 11:00) (57 - 70)  BP: 173/87 (14 Jul 2023 11:00) (129/63 - 176/77)  BP(mean): 122 (14 Jul 2023 11:00) (87 - 122)  RR: 28 (14 Jul 2023 11:00) (14 - 32)  SpO2: 98% (14 Jul 2023 11:00) (94% - 99%)    Parameters below as of 14 Jul 2023 11:00  Patient On (Oxygen Delivery Method): room air        Physical Exam:  General: NAD, resting comfortably in bed  C/V: NSR  Pulm: Nonlabored breathing, no respiratory distress  Abd: soft, nondistended, dressing taken down, bridging mesh covered in Xeroform now w/ bilious drainage in the wound bed, ileostomy ppp w/ bilious output in pouch, mucous fistula pink and vitalized. Packed wet to dry. R JOYCE SS output, L JOYCE bilious.  Extrem: WWP, no edema, SCDs in place        I&O's Detail    13 Jul 2023 07:01  -  14 Jul 2023 07:00  --------------------------------------------------------  IN:    Fat Emulsion (Fish Oil &amp; Plant Based) 20% Infusion: 208 mL    Oral Fluid: 840 mL    TPN (Total Parenteral Nutrition): 1909 mL  Total IN: 2957 mL    OUT:    Bulb (mL): 55 mL    Bulb (mL): 15 mL    Fat Emulsion (Fish Oil &amp; Plant Based) 20% Infusion: 0 mL    Ileostomy (mL): 45 mL    Voided (mL): 2375 mL  Total OUT: 2490 mL    Total NET: 467 mL      14 Jul 2023 07:01  -  14 Jul 2023 12:04  --------------------------------------------------------  IN:    TPN (Total Parenteral Nutrition): 415 mL  Total IN: 415 mL    OUT:    Fat Emulsion (Fish Oil &amp; Plant Based) 20% Infusion: 0 mL    Voided (mL): 400 mL  Total OUT: 400 mL    Total NET: 15 mL          LABS:                        9.4    10.48 )-----------( 387      ( 14 Jul 2023 06:08 )             29.5     07-14    133<L>  |  100  |  20  ----------------------------<  127<H>  4.2   |  27  |  1.02    Ca    7.4<L>      14 Jul 2023 06:08  Phos  3.3     07-14  Mg     2.1     07-14        Urinalysis Basic - ( 14 Jul 2023 06:08 )    Color: x / Appearance: x / SG: x / pH: x  Gluc: 127 mg/dL / Ketone: x  / Bili: x / Urobili: x   Blood: x / Protein: x / Nitrite: x   Leuk Esterase: x / RBC: x / WBC x   Sq Epi: x / Non Sq Epi: x / Bacteria: x

## 2023-07-14 NOTE — PROGRESS NOTE ADULT - SUBJECTIVE AND OBJECTIVE BOX
Pt seen and examined   no complaints  says she is doing well      REVIEW OF SYSTEMS:  Constitutional: No fever  Cardiovascular: No chest pain, palpitations,  Gastrointestinal: No abdominal or epigastric pain. No nausea, vomiting  No diarrhea    Skin: No itching, burning, rashes or lesions       MEDICATIONS:  MEDICATIONS  (STANDING):  aMIOdarone    Tablet 200 milliGRAM(s) Oral daily  atorvastatin 20 milliGRAM(s) Oral at bedtime  chlorhexidine 2% Cloths 1 Application(s) Topical <User Schedule>  dextrose 5%. 1000 milliLiter(s) (50 mL/Hr) IV Continuous <Continuous>  dextrose 5%. 1000 milliLiter(s) (100 mL/Hr) IV Continuous <Continuous>  dextrose 50% Injectable 25 Gram(s) IV Push once  dextrose 50% Injectable 12.5 Gram(s) IV Push once  dextrose 50% Injectable 25 Gram(s) IV Push once  glucagon  Injectable 1 milliGRAM(s) IntraMuscular once  heparin   Injectable 5000 Unit(s) SubCutaneous every 8 hours  insulin lispro (ADMELOG) corrective regimen sliding scale   SubCutaneous every 6 hours  levothyroxine 50 MICROGram(s) Oral daily  lidocaine 1%/epinephrine 1:100,000 Inj 20 milliLiter(s) Local Injection once  losartan 25 milliGRAM(s) Oral daily  metoprolol tartrate 25 milliGRAM(s) Oral every 12 hours  octreotide  Injectable 100 MICROGram(s) IV Push every 8 hours  pantoprazole  Injectable 40 milliGRAM(s) IV Push daily  Parenteral Nutrition - Adult 1 Each (83 mL/Hr) TPN Continuous <Continuous>  venlafaxine XR. 300 milliGRAM(s) Oral daily    MEDICATIONS  (PRN):  acetaminophen     Tablet .. 650 milliGRAM(s) Oral every 6 hours PRN Mild Pain (1 - 3)  benzocaine/menthol Lozenge 1 Lozenge Oral two times a day PRN Mouth Sores  dextrose Oral Gel 15 Gram(s) Oral once PRN Blood Glucose LESS THAN 70 milliGRAM(s)/deciliter  hydrALAZINE Injectable 10 milliGRAM(s) IV Push every 6 hours PRN SBP > 160  melatonin 5 milliGRAM(s) Oral at bedtime PRN Sleep      Allergies    penicillin (Angioedema)    Intolerances        Vital Signs Last 24 Hrs  T(C): 36.9 (14 Jul 2023 08:00), Max: 37.1 (13 Jul 2023 13:00)  T(F): 98.5 (14 Jul 2023 08:00), Max: 98.8 (13 Jul 2023 13:00)  HR: 61 (14 Jul 2023 11:00) (57 - 70)  BP: 153/71 (14 Jul 2023 10:00) (129/63 - 176/77)  BP(mean): 102 (14 Jul 2023 10:00) (87 - 122)  RR: 28 (14 Jul 2023 11:00) (14 - 32)  SpO2: 98% (14 Jul 2023 11:00) (93% - 99%)    Parameters below as of 14 Jul 2023 11:00  Patient On (Oxygen Delivery Method): room air        07-13 @ 07:01 - 07-14 @ 07:00  --------------------------------------------------------  IN: 2957 mL / OUT: 2490 mL / NET: 467 mL    07-14 @ 07:01 - 07-14 @ 11:49  --------------------------------------------------------  IN: 415 mL / OUT: 400 mL / NET: 15 mL        PHYSICAL EXAM:    General: W in no acute distress  HEENT: MMM, conjunctiva and sclera clear  Gastrointestinal: Soft non-tender non-distended; Normal bowel sounds; ileostomy, 2 JPs  Skin: Warm and dry. No obvious rash    LABS:      CBC Full  -  ( 14 Jul 2023 06:08 )  WBC Count : 10.48 K/uL  RBC Count : 3.11 M/uL  Hemoglobin : 9.4 g/dL  Hematocrit : 29.5 %  Platelet Count - Automated : 387 K/uL  Mean Cell Volume : 94.9 fl  Mean Cell Hemoglobin : 30.2 pg  Mean Cell Hemoglobin Concentration : 31.9 gm/dL  Auto Neutrophil # : x  Auto Lymphocyte # : x  Auto Monocyte # : x  Auto Eosinophil # : x  Auto Basophil # : x  Auto Neutrophil % : x  Auto Lymphocyte % : x  Auto Monocyte % : x  Auto Eosinophil % : x  Auto Basophil % : x    07-14    133<L>  |  100  |  20  ----------------------------<  127<H>  4.2   |  27  |  1.02    Ca    7.4<L>      14 Jul 2023 06:08  Phos  3.3     07-14  Mg     2.1     07-14            Urinalysis Basic - ( 14 Jul 2023 06:08 )    Color: x / Appearance: x / SG: x / pH: x  Gluc: 127 mg/dL / Ketone: x  / Bili: x / Urobili: x   Blood: x / Protein: x / Nitrite: x   Leuk Esterase: x / RBC: x / WBC x   Sq Epi: x / Non Sq Epi: x / Bacteria: x                RADIOLOGY & ADDITIONAL STUDIES (The following images were personally reviewed):

## 2023-07-14 NOTE — PROGRESS NOTE ADULT - SUBJECTIVE AND OBJECTIVE BOX
ON: Urine lytes: FeUrea: Intrinsic. UA: Neg for inf + Casts. Pt eating ice cream -therefore  reminded of NPO status. dressing changed. Net pos 425@5am    SUBJECTIVE: Pt seen and examined at bedside this am by ICU team. Patient is lying comfortably in bed with no complaints. Denies nausea, emesis. States pain is controlled. Reports he has been OOB. Reports he feels well overall.      MEDICATIONS  (STANDING):  aMIOdarone    Tablet 200 milliGRAM(s) Oral daily  atorvastatin 20 milliGRAM(s) Oral at bedtime  chlorhexidine 2% Cloths 1 Application(s) Topical <User Schedule>  dextrose 5%. 1000 milliLiter(s) (50 mL/Hr) IV Continuous <Continuous>  dextrose 5%. 1000 milliLiter(s) (100 mL/Hr) IV Continuous <Continuous>  dextrose 50% Injectable 25 Gram(s) IV Push once  dextrose 50% Injectable 25 Gram(s) IV Push once  dextrose 50% Injectable 12.5 Gram(s) IV Push once  glucagon  Injectable 1 milliGRAM(s) IntraMuscular once  heparin   Injectable 5000 Unit(s) SubCutaneous every 8 hours  insulin lispro (ADMELOG) corrective regimen sliding scale   SubCutaneous every 6 hours  levothyroxine 50 MICROGram(s) Oral daily  lidocaine 1%/epinephrine 1:100,000 Inj 20 milliLiter(s) Local Injection once  lipid, fat emulsion (Fish Oil and Plant Based) 20% Infusion 0.5 Gm/kG/Day (20.83 mL/Hr) IV Continuous <Continuous>  losartan 25 milliGRAM(s) Oral daily  metoprolol tartrate 25 milliGRAM(s) Oral every 12 hours  octreotide  Injectable 100 MICROGram(s) IV Push every 8 hours  pantoprazole  Injectable 40 milliGRAM(s) IV Push daily  Parenteral Nutrition - Adult 1 Each (83 mL/Hr) TPN Continuous <Continuous>  Parenteral Nutrition - Adult 1 Each (83 mL/Hr) TPN Continuous <Continuous>  venlafaxine XR. 300 milliGRAM(s) Oral daily    MEDICATIONS  (PRN):  acetaminophen     Tablet .. 650 milliGRAM(s) Oral every 6 hours PRN Mild Pain (1 - 3)  benzocaine/menthol Lozenge 1 Lozenge Oral two times a day PRN Mouth Sores  dextrose Oral Gel 15 Gram(s) Oral once PRN Blood Glucose LESS THAN 70 milliGRAM(s)/deciliter  hydrALAZINE Injectable 10 milliGRAM(s) IV Push every 6 hours PRN SBP > 160  melatonin 5 milliGRAM(s) Oral at bedtime PRN Sleep      Drips: N/A    ICU Vital Signs Last 24 Hrs  T(C): 36.9 (14 Jul 2023 08:00), Max: 36.9 (14 Jul 2023 08:00)  T(F): 98.5 (14 Jul 2023 08:00), Max: 98.5 (14 Jul 2023 08:00)  HR: 58 (14 Jul 2023 13:00) (57 - 70)  BP: 164/67 (14 Jul 2023 13:00) (129/63 - 176/77)  BP(mean): 97 (14 Jul 2023 13:00) (87 - 122)  ABP: --  ABP(mean): --  RR: 23 (14 Jul 2023 13:00) (14 - 32)  SpO2: 95% (14 Jul 2023 13:00) (94% - 99%)    O2 Parameters below as of 14 Jul 2023 13:00  Patient On (Oxygen Delivery Method): room air            Physical Exam:  General: NAD  HEENT: NC/AT, EOMI, PERRLA, normal hearing, no oral lesions, neck supple w/o LAD, no JVD  Pulmonary: Nonlabored breathing, no respiratory distress, CTA-B  Cardiovascular: NSR, no murmurs  Abdominal: soft, nondistended, minimally tender. No rebound, no guarding. Midline wound open with vicryl mesh. Dressed with xeroform, kerlix, ABD. JOYCE x2 noted - bilious to wall suction and SS. ostomy PPP with brown output in ostomy appliance   Extremities: WWP, 5/5 strength x 4, 2+ edema noted to level of mid calf  Neuro: A/O x3, CNs II-XII grossly intact, normal motor/sensation, no focal deficits          I&O's Summary    13 Jul 2023 07:01  -  14 Jul 2023 07:00  --------------------------------------------------------  IN: 2957 mL / OUT: 2490 mL / NET: 467 mL    14 Jul 2023 07:01  -  14 Jul 2023 13:14  --------------------------------------------------------  IN: 415 mL / OUT: 400 mL / NET: 15 mL        LABS:                        9.4    10.48 )-----------( 387      ( 14 Jul 2023 06:08 )             29.5     07-14    133<L>  |  100  |  20  ----------------------------<  127<H>  4.2   |  27  |  1.02    Ca    7.4<L>      14 Jul 2023 06:08  Phos  3.3     07-14  Mg     2.1     07-14        Urinalysis Basic - ( 14 Jul 2023 06:08 )    Color: x / Appearance: x / SG: x / pH: x  Gluc: 127 mg/dL / Ketone: x  / Bili: x / Urobili: x   Blood: x / Protein: x / Nitrite: x   Leuk Esterase: x / RBC: x / WBC x   Sq Epi: x / Non Sq Epi: x / Bacteria: x      CAPILLARY BLOOD GLUCOSE      POCT Blood Glucose.: 127 mg/dL (14 Jul 2023 11:26)  POCT Blood Glucose.: 115 mg/dL (14 Jul 2023 06:44)  POCT Blood Glucose.: 109 mg/dL (13 Jul 2023 23:19)  POCT Blood Glucose.: 83 mg/dL (13 Jul 2023 18:15)        Cultures:Culture Results:   No growth at 12 hours (07-13 @ 09:43)      RADIOLOGY & ADDITIONAL STUDIES:

## 2023-07-14 NOTE — SWALLOW BEDSIDE ASSESSMENT ADULT - COMMENTS
CXR 7/13, "[...] No acute infiltrates appearing since prior exam 7/11/2023. No definite lung consolidation [...]."

## 2023-07-15 NOTE — PROGRESS NOTE ADULT - SUBJECTIVE AND OBJECTIVE BOX
Covering for Dr. Peterson.    No complaints, no pain.  AFVSS  Abd soft, ND.  VAC on, sealed. Output bilious  R JOYCE SSF  L JOYCE bilious  Ileostomy flush, viable    A/P: POD 10 resection of leaking ileocolic and small bowel anastomoses, now with fistulas which seem controlled.  1. Off abx  2. sips/chips  3. TPN  4. OOB to chair  5. SQH  6. Monitor drain outputs to ensure no volume loss.

## 2023-07-15 NOTE — PROGRESS NOTE ADULT - ATTENDING COMMENTS
Crohn's disease s/p complex course as above (ileocoloc resection, wound dehiscence, end ileostomy), improved. Polymicrobial infection; c/w abx. Rest of plan as per above.

## 2023-07-15 NOTE — PROGRESS NOTE ADULT - ASSESSMENT
78M w/ Crohn's, AFib/Flutter s/p DCCVs on amiodarone, remote ileocectomy and open appy here for SBO vs Crohns flare, s/p NGT decompression and now s/p lap TRE converted to open TRE, SBR x 3, left in discontinuity with abthera vac on 6/27, RTOR for ileocolic resection, small bowel anastomosis, and abdominal wall closure on 6/28, and s/p IR aspiration of perihepatic fluid on 7/3, stepped down to telemetery on 7/4. wound dehisence on 7/5, RTOR exlap, washout, ileocolic resection with end ileostomy, blow hole colostomy, red rubber from ileostomy to small bowel anastomosis; vicryl bridging mesh on 7/5. Transferred to SICU post op for HD monitoring. Extubated 7/6.    Neuro: Tylenol PRN for pain. Hx anxiety: Continue venlafaxine  CV: [place] MAP > 65. Hx of Afib/flutter: s/p DCCV, on PO amiodarone, metoprolol. Hx of HTN- continue home losartan and continue hydralazine IV PRN for SBOP < 160. Hx of HLD: continue home rosuvastatin; TTE (04/22) - PASP 50mmHg, EF 69%. Diuresis with 40 Lasix today  Pulm: Extubated 7/6 - now on RA saturating well. s/p Chest tube by CT surg (6/27--7/3)   GI: NPO - pending SLP evaluation. TPN/lipids, wound dehisense -- RTOR on 7/5 for exlap, washout, ileocolic resection with end ileostomy, blow hole colostomy, red rubber from ileostomy to small bowel anastomosis; vicryl bridging mesh - continue TID dressing changes, Continue Octreotide.   : Voids. BUN/Cr downtrending today - U[place] with intrinisic injury pattern.  ID: Perforated viscus, IR Cx 7/3: C. tertium, Lactobacillis. OR Cx 7/5 - rare yeast - Imipenem (6/30-7/12), Fluconazole (6/30 -7/12) OR Cx 6/28: Vanc-resistant/amp-sens (but allergic) enterococcus s/p CTX (6/26-6/30), Flagyl (6/26-6/30), Dapto (6/30-7/5). WBC downtrending today. Monitor BCx  Endo: ISS; Hx of hypothyroid: cont synthroid IV. Gout: holding home allopurionol  PPx: SCD, HSQ. Pending b/l LE duplex today.  Lines: PIV x 2, RUE PICC (6/30--), [place] (7/8-7/10) // DC: R Chest tube for iatrogenic PTX (6/27--7/3), R TLC (06/26-30). New R TLC (7/5 -7/12)  Wounds/Drains: midline incision; xeroform and WTD Kerlix, tid dressing changes; R JOYCE below ileostomy, L JOYCE at small bowel enterotomy repair. Potential for wound vac today  PT/OT: not ordered  Dispo: SICU

## 2023-07-15 NOTE — PROGRESS NOTE ADULT - SUBJECTIVE AND OBJECTIVE BOX
SUBJECTIVE: Patient seen and evaluated. No specific complaints this ammmmm        MEDICATIONS  (STANDING):  aMIOdarone    Tablet 200 milliGRAM(s) Oral daily  atorvastatin 20 milliGRAM(s) Oral at bedtime  chlorhexidine 2% Cloths 1 Application(s) Topical <User Schedule>  dextrose 5%. 1000 milliLiter(s) (50 mL/Hr) IV Continuous <Continuous>  dextrose 5%. 1000 milliLiter(s) (100 mL/Hr) IV Continuous <Continuous>  dextrose 50% Injectable 25 Gram(s) IV Push once  dextrose 50% Injectable 12.5 Gram(s) IV Push once  dextrose 50% Injectable 25 Gram(s) IV Push once  glucagon  Injectable 1 milliGRAM(s) IntraMuscular once  heparin   Injectable 5000 Unit(s) SubCutaneous every 8 hours  insulin lispro (ADMELOG) corrective regimen sliding scale   SubCutaneous every 6 hours  levothyroxine 50 MICROGram(s) Oral daily  lipid, fat emulsion (Fish Oil and Plant Based) 20% Infusion 0.5 Gm/kG/Day (20.83 mL/Hr) IV Continuous <Continuous>  losartan 25 milliGRAM(s) Oral daily  metoprolol tartrate 25 milliGRAM(s) Oral every 12 hours  octreotide  Injectable 100 MICROGram(s) IV Push every 8 hours  pantoprazole  Injectable 40 milliGRAM(s) IV Push daily  Parenteral Nutrition - Adult 1 Each (83 mL/Hr) TPN Continuous <Continuous>  Parenteral Nutrition - Adult 1 Each (83 mL/Hr) TPN Continuous <Continuous>  venlafaxine XR. 300 milliGRAM(s) Oral daily    MEDICATIONS  (PRN):  acetaminophen     Tablet .. 650 milliGRAM(s) Oral every 6 hours PRN Mild Pain (1 - 3)  benzocaine/menthol Lozenge 1 Lozenge Oral two times a day PRN Mouth Sores  dextrose Oral Gel 15 Gram(s) Oral once PRN Blood Glucose LESS THAN 70 milliGRAM(s)/deciliter  hydrALAZINE Injectable 10 milliGRAM(s) IV Push every 6 hours PRN SBP > 160  melatonin 5 milliGRAM(s) Oral at bedtime PRN Sleep      Vital Signs Last 24 Hrs  T(C): 36.9 (15 Jul 2023 10:00), Max: 37 (15 Jul 2023 06:01)  T(F): 98.5 (15 Jul 2023 10:00), Max: 98.6 (15 Jul 2023 06:01)  HR: 59 (15 Jul 2023 09:00) (54 - 63)  BP: 158/68 (15 Jul 2023 09:00) (136/65 - 184/73)  BP(mean): 98 (15 Jul 2023 09:00) (89 - 111)  RR: 30 (15 Jul 2023 09:00) (20 - 33)  SpO2: 96% (15 Jul 2023 09:00) (94% - 100%)    Parameters below as of 15 Jul 2023 09:00  Patient On (Oxygen Delivery Method): room air        Physical Exam:  General: NAD  Pulmonary: Nonlabored breathing, no respiratory distress, CTA-B  Cardiovascular: NSR, no murmurs  Abdominal: soft, minimally tender. No rebound, no guarding. Midline wound vac in place- bilious, JOYCE x2 noted - bilious to wall suction and SS. ostomy PPP with brown output in ostomy appliance   Extremities: WWP, 5/5 strength x 4, 2+ edema noted to level of mid calf  Neuro: A/O x3, CNs II-XII grossly intact, normal motor/sensation, no focal deficits    I&O's Summary    14 Jul 2023 07:01  -  15 Jul 2023 07:00  --------------------------------------------------------  IN: 2845.4 mL / OUT: 4307 mL / NET: -1461.6 mL    15 Jul 2023 07:01  -  15 Jul 2023 11:22  --------------------------------------------------------  IN: 166 mL / OUT: 425 mL / NET: -259 mL        LABS:                        9.9    12.60 )-----------( 362      ( 15 Jul 2023 05:49 )             31.1     07-15    136  |  102  |  24<H>  ----------------------------<  80  5.0   |  26  |  1.06    Ca    7.8<L>      15 Jul 2023 05:49  Phos  3.1     07-15  Mg     2.1     07-15        Urinalysis Basic - ( 15 Jul 2023 05:49 )    Color: x / Appearance: x / SG: x / pH: x  Gluc: 80 mg/dL / Ketone: x  / Bili: x / Urobili: x   Blood: x / Protein: x / Nitrite: x   Leuk Esterase: x / RBC: x / WBC x   Sq Epi: x / Non Sq Epi: x / Bacteria: x      CAPILLARY BLOOD GLUCOSE      POCT Blood Glucose.: 132 mg/dL (15 Jul 2023 06:41)  POCT Blood Glucose.: 72 mg/dL (15 Jul 2023 06:39)  POCT Blood Glucose.: 123 mg/dL (14 Jul 2023 23:41)  POCT Blood Glucose.: 116 mg/dL (14 Jul 2023 17:09)  POCT Blood Glucose.: 127 mg/dL (14 Jul 2023 11:26)        RADIOLOGY & ADDITIONAL STUDIES:

## 2023-07-15 NOTE — PROGRESS NOTE ADULT - SUBJECTIVE AND OBJECTIVE BOX
Overnight events:       POD#    SUBJECTIVE:      MEDICATIONS  (STANDING):  aMIOdarone    Tablet 200 milliGRAM(s) Oral daily  atorvastatin 20 milliGRAM(s) Oral at bedtime  chlorhexidine 2% Cloths 1 Application(s) Topical <User Schedule>  dextrose 5%. 1000 milliLiter(s) (100 mL/Hr) IV Continuous <Continuous>  dextrose 5%. 1000 milliLiter(s) (50 mL/Hr) IV Continuous <Continuous>  dextrose 50% Injectable 25 Gram(s) IV Push once  dextrose 50% Injectable 12.5 Gram(s) IV Push once  dextrose 50% Injectable 25 Gram(s) IV Push once  glucagon  Injectable 1 milliGRAM(s) IntraMuscular once  heparin   Injectable 5000 Unit(s) SubCutaneous every 8 hours  insulin lispro (ADMELOG) corrective regimen sliding scale   SubCutaneous every 6 hours  levothyroxine 50 MICROGram(s) Oral daily  lipid, fat emulsion (Fish Oil and Plant Based) 20% Infusion 0.5 Gm/kG/Day (20.83 mL/Hr) IV Continuous <Continuous>  losartan 25 milliGRAM(s) Oral daily  metoprolol tartrate 25 milliGRAM(s) Oral every 12 hours  octreotide  Injectable 100 MICROGram(s) IV Push every 8 hours  pantoprazole  Injectable 40 milliGRAM(s) IV Push daily  Parenteral Nutrition - Adult 1 Each (83 mL/Hr) TPN Continuous <Continuous>  Parenteral Nutrition - Adult 1 Each (83 mL/Hr) TPN Continuous <Continuous>  venlafaxine XR. 300 milliGRAM(s) Oral daily    MEDICATIONS  (PRN):  acetaminophen     Tablet .. 650 milliGRAM(s) Oral every 6 hours PRN Mild Pain (1 - 3)  benzocaine/menthol Lozenge 2 Lozenge Oral every 4 hours PRN Sore Throat  dextrose Oral Gel 15 Gram(s) Oral once PRN Blood Glucose LESS THAN 70 milliGRAM(s)/deciliter  hydrALAZINE Injectable 10 milliGRAM(s) IV Push every 6 hours PRN SBP > 160  melatonin 5 milliGRAM(s) Oral at bedtime PRN Sleep      Vital Signs Last 24 Hrs  T(C): 37.6 (15 Jul 2023 21:55), Max: 37.6 (15 Jul 2023 21:55)  T(F): 99.6 (15 Jul 2023 21:55), Max: 99.6 (15 Jul 2023 21:55)  HR: 69 (15 Jul 2023 21:00) (55 - 71)  BP: 158/68 (15 Jul 2023 21:00) (137/86 - 178/77)  BP(mean): 98 (15 Jul 2023 21:00) (91 - 119)  RR: 26 (15 Jul 2023 21:00) (22 - 39)  SpO2: 97% (15 Jul 2023 21:00) (93% - 99%)    Parameters below as of 15 Jul 2023 21:00  Patient On (Oxygen Delivery Method): room air        Physical Exam:  General: NAD, resting comfortably in bed  Pulmonary: Nonlabored breathing, no respiratory distress  Cardiovascular: NSR  Abdominal: soft, NT/ND  Extremities: WWP, normal strength  Neuro: A/O x 3, CNs II-XII grossly intact, no focal deficits, normal motor/sensation  Pulses: palpable distal pulses    I&O's Summary    14 Jul 2023 07:01  -  15 Jul 2023 07:00  --------------------------------------------------------  IN: 2845.4 mL / OUT: 4307 mL / NET: -1461.6 mL    15 Jul 2023 07:01  -  15 Jul 2023 21:16  --------------------------------------------------------  IN: 1058.4 mL / OUT: 1285 mL / NET: -226.6 mL        LABS:                        9.9    12.60 )-----------( 362      ( 15 Jul 2023 05:49 )             31.1     07-15    136  |  102  |  24<H>  ----------------------------<  80  5.0   |  26  |  1.06    Ca    7.8<L>      15 Jul 2023 05:49  Phos  3.1     07-15  Mg     2.1     07-15        Urinalysis Basic - ( 15 Jul 2023 05:49 )    Color: x / Appearance: x / SG: x / pH: x  Gluc: 80 mg/dL / Ketone: x  / Bili: x / Urobili: x   Blood: x / Protein: x / Nitrite: x   Leuk Esterase: x / RBC: x / WBC x   Sq Epi: x / Non Sq Epi: x / Bacteria: x      CAPILLARY BLOOD GLUCOSE      POCT Blood Glucose.: 98 mg/dL (15 Jul 2023 18:56)  POCT Blood Glucose.: 96 mg/dL (15 Jul 2023 13:06)  POCT Blood Glucose.: 132 mg/dL (15 Jul 2023 06:41)  POCT Blood Glucose.: 72 mg/dL (15 Jul 2023 06:39)  POCT Blood Glucose.: 123 mg/dL (14 Jul 2023 23:41)        RADIOLOGY & ADDITIONAL STUDIES:   Subjective: Patient seen at bedside with chief resident. Patient laying in bed. Denies nay pain, nausea, or vomiting       MEDICATIONS  (STANDING):  aMIOdarone    Tablet 200 milliGRAM(s) Oral daily  atorvastatin 20 milliGRAM(s) Oral at bedtime  chlorhexidine 2% Cloths 1 Application(s) Topical <User Schedule>  dextrose 5%. 1000 milliLiter(s) (100 mL/Hr) IV Continuous <Continuous>  dextrose 5%. 1000 milliLiter(s) (50 mL/Hr) IV Continuous <Continuous>  dextrose 50% Injectable 25 Gram(s) IV Push once  dextrose 50% Injectable 12.5 Gram(s) IV Push once  dextrose 50% Injectable 25 Gram(s) IV Push once  glucagon  Injectable 1 milliGRAM(s) IntraMuscular once  heparin   Injectable 5000 Unit(s) SubCutaneous every 8 hours  insulin lispro (ADMELOG) corrective regimen sliding scale   SubCutaneous every 6 hours  levothyroxine 50 MICROGram(s) Oral daily  lipid, fat emulsion (Fish Oil and Plant Based) 20% Infusion 0.5 Gm/kG/Day (20.83 mL/Hr) IV Continuous <Continuous>  losartan 25 milliGRAM(s) Oral daily  metoprolol tartrate 25 milliGRAM(s) Oral every 12 hours  octreotide  Injectable 100 MICROGram(s) IV Push every 8 hours  pantoprazole  Injectable 40 milliGRAM(s) IV Push daily  Parenteral Nutrition - Adult 1 Each (83 mL/Hr) TPN Continuous <Continuous>  Parenteral Nutrition - Adult 1 Each (83 mL/Hr) TPN Continuous <Continuous>  venlafaxine XR. 300 milliGRAM(s) Oral daily    MEDICATIONS  (PRN):  acetaminophen     Tablet .. 650 milliGRAM(s) Oral every 6 hours PRN Mild Pain (1 - 3)  benzocaine/menthol Lozenge 2 Lozenge Oral every 4 hours PRN Sore Throat  dextrose Oral Gel 15 Gram(s) Oral once PRN Blood Glucose LESS THAN 70 milliGRAM(s)/deciliter  hydrALAZINE Injectable 10 milliGRAM(s) IV Push every 6 hours PRN SBP > 160  melatonin 5 milliGRAM(s) Oral at bedtime PRN Sleep      Vital Signs Last 24 Hrs  T(C): 37.6 (15 Jul 2023 21:55), Max: 37.6 (15 Jul 2023 21:55)  T(F): 99.6 (15 Jul 2023 21:55), Max: 99.6 (15 Jul 2023 21:55)  HR: 69 (15 Jul 2023 21:00) (55 - 71)  BP: 158/68 (15 Jul 2023 21:00) (137/86 - 178/77)  BP(mean): 98 (15 Jul 2023 21:00) (91 - 119)  RR: 26 (15 Jul 2023 21:00) (22 - 39)  SpO2: 97% (15 Jul 2023 21:00) (93% - 99%)    Parameters below as of 15 Jul 2023 21:00  Patient On (Oxygen Delivery Method): room air        Physical Exam:  General: NAD, resting comfortably in bed  C/V: NSR  Pulm: Nonlabored breathing, no respiratory distress  Abd: soft, nondistended, vac placement in place, R JOYCE SS output, L JOYCE bilious.  Extrem: WWP, no edema, SCDs in placepulses    I&O's Summary    14 Jul 2023 07:01  -  15 Jul 2023 07:00  --------------------------------------------------------  IN: 2845.4 mL / OUT: 4307 mL / NET: -1461.6 mL    15 Jul 2023 07:01  -  15 Jul 2023 21:16  --------------------------------------------------------  IN: 1058.4 mL / OUT: 1285 mL / NET: -226.6 mL        LABS:                        9.9    12.60 )-----------( 362      ( 15 Jul 2023 05:49 )             31.1     07-15    136  |  102  |  24<H>  ----------------------------<  80  5.0   |  26  |  1.06    Ca    7.8<L>      15 Jul 2023 05:49  Phos  3.1     07-15  Mg     2.1     07-15        Urinalysis Basic - ( 15 Jul 2023 05:49 )    Color: x / Appearance: x / SG: x / pH: x  Gluc: 80 mg/dL / Ketone: x  / Bili: x / Urobili: x   Blood: x / Protein: x / Nitrite: x   Leuk Esterase: x / RBC: x / WBC x   Sq Epi: x / Non Sq Epi: x / Bacteria: x      CAPILLARY BLOOD GLUCOSE      POCT Blood Glucose.: 98 mg/dL (15 Jul 2023 18:56)  POCT Blood Glucose.: 96 mg/dL (15 Jul 2023 13:06)  POCT Blood Glucose.: 132 mg/dL (15 Jul 2023 06:41)  POCT Blood Glucose.: 72 mg/dL (15 Jul 2023 06:39)  POCT Blood Glucose.: 123 mg/dL (14 Jul 2023 23:41)        RADIOLOGY & ADDITIONAL STUDIES:

## 2023-07-15 NOTE — PROGRESS NOTE ADULT - SUBJECTIVE AND OBJECTIVE BOX
Awake and alert Afeb VS stable In RR Clear chest ant Edema unchanged WBCs slightly increased creatinine and lytes ok

## 2023-07-15 NOTE — PROGRESS NOTE ADULT - SUBJECTIVE AND OBJECTIVE BOX
Pt seen and examined   s/p vac July 14  no complaints this am  vss  wbc up off antibiotics    REVIEW OF SYSTEMS:  Constitutional: No fever,  Cardiovascular: No chest pain, palpitations,   Gastrointestinal: No abdominal or epigastric pain. No nausea, vomiting   Skin: No itching, burning, rashes or lesions       MEDICATIONS:  MEDICATIONS  (STANDING):  aMIOdarone    Tablet 200 milliGRAM(s) Oral daily  atorvastatin 20 milliGRAM(s) Oral at bedtime  chlorhexidine 2% Cloths 1 Application(s) Topical <User Schedule>  dextrose 5%. 1000 milliLiter(s) (50 mL/Hr) IV Continuous <Continuous>  dextrose 5%. 1000 milliLiter(s) (100 mL/Hr) IV Continuous <Continuous>  dextrose 50% Injectable 25 Gram(s) IV Push once  dextrose 50% Injectable 12.5 Gram(s) IV Push once  dextrose 50% Injectable 25 Gram(s) IV Push once  glucagon  Injectable 1 milliGRAM(s) IntraMuscular once  heparin   Injectable 5000 Unit(s) SubCutaneous every 8 hours  insulin lispro (ADMELOG) corrective regimen sliding scale   SubCutaneous every 6 hours  levothyroxine 50 MICROGram(s) Oral daily  lipid, fat emulsion (Fish Oil and Plant Based) 20% Infusion 0.5 Gm/kG/Day (20.83 mL/Hr) IV Continuous <Continuous>  losartan 25 milliGRAM(s) Oral daily  metoprolol tartrate 25 milliGRAM(s) Oral every 12 hours  octreotide  Injectable 100 MICROGram(s) IV Push every 8 hours  pantoprazole  Injectable 40 milliGRAM(s) IV Push daily  Parenteral Nutrition - Adult 1 Each (83 mL/Hr) TPN Continuous <Continuous>  Parenteral Nutrition - Adult 1 Each (83 mL/Hr) TPN Continuous <Continuous>  venlafaxine XR. 300 milliGRAM(s) Oral daily    MEDICATIONS  (PRN):  acetaminophen     Tablet .. 650 milliGRAM(s) Oral every 6 hours PRN Mild Pain (1 - 3)  benzocaine/menthol Lozenge 1 Lozenge Oral two times a day PRN Mouth Sores  dextrose Oral Gel 15 Gram(s) Oral once PRN Blood Glucose LESS THAN 70 milliGRAM(s)/deciliter  hydrALAZINE Injectable 10 milliGRAM(s) IV Push every 6 hours PRN SBP > 160  melatonin 5 milliGRAM(s) Oral at bedtime PRN Sleep      Allergies    penicillin (Angioedema)    Intolerances        Vital Signs Last 24 Hrs  T(C): 36.9 (15 Jul 2023 10:00), Max: 37 (15 Jul 2023 06:01)  T(F): 98.5 (15 Jul 2023 10:00), Max: 98.6 (15 Jul 2023 06:01)  HR: 57 (15 Jul 2023 11:00) (54 - 63)  BP: 171/74 (15 Jul 2023 11:00) (136/65 - 184/73)  BP(mean): 106 (15 Jul 2023 11:00) (89 - 111)  RR: 30 (15 Jul 2023 11:00) (20 - 33)  SpO2: 97% (15 Jul 2023 11:00) (94% - 100%)    Parameters below as of 15 Jul 2023 11:00  Patient On (Oxygen Delivery Method): room air        07-14 @ 07:01  -  07-15 @ 07:00  --------------------------------------------------------  IN: 2845.4 mL / OUT: 4307 mL / NET: -1461.6 mL    07-15 @ 07:01  -  07-15 @ 12:20  --------------------------------------------------------  IN: 166 mL / OUT: 425 mL / NET: -259 mL        PHYSICAL EXAM:    General:  in no acute distress  HEENT: MMM, conjunctiva and sclera clear  Gastrointestinal: Soft non-tender non-distended; Normal bowel sounds; ileostomy viable 2 JPs, dressings intact  Skin: Warm and dry.     LABS:      CBC Full  -  ( 15 Jul 2023 05:49 )  WBC Count : 12.60 K/uL  RBC Count : 3.29 M/uL  Hemoglobin : 9.9 g/dL  Hematocrit : 31.1 %  Platelet Count - Automated : 362 K/uL  Mean Cell Volume : 94.5 fl  Mean Cell Hemoglobin : 30.1 pg  Mean Cell Hemoglobin Concentration : 31.8 gm/dL  Auto Neutrophil # : 11.38 K/uL  Auto Lymphocyte # : 0.21 K/uL  Auto Monocyte # : 0.55 K/uL  Auto Eosinophil # : 0.11 K/uL  Auto Basophil # : 0.11 K/uL  Auto Neutrophil % : 87.7 %  Auto Lymphocyte % : 1.7 %  Auto Monocyte % : 4.4 %  Auto Eosinophil % : 0.9 %  Auto Basophil % : 0.9 %    07-15    136  |  102  |  24<H>  ----------------------------<  80  5.0   |  26  |  1.06    Ca    7.8<L>      15 Jul 2023 05:49  Phos  3.1     07-15  Mg     2.1     07-15            Urinalysis Basic - ( 15 Jul 2023 05:49 )    Color: x / Appearance: x / SG: x / pH: x  Gluc: 80 mg/dL / Ketone: x  / Bili: x / Urobili: x   Blood: x / Protein: x / Nitrite: x   Leuk Esterase: x / RBC: x / WBC x   Sq Epi: x / Non Sq Epi: x / Bacteria: x                RADIOLOGY & ADDITIONAL STUDIES (The following images were personally reviewed):

## 2023-07-15 NOTE — PROGRESS NOTE ADULT - SUBJECTIVE AND OBJECTIVE BOX
STATUS POST:  Exploratory laparotomy      POST OPERATIVE DAY #:     SUBJECTIVE:     Vital Signs Last 24 Hrs  T(C): 37 (15 Jul 2023 06:01), Max: 37 (15 Jul 2023 06:01)  T(F): 98.6 (15 Jul 2023 06:01), Max: 98.6 (15 Jul 2023 06:01)  HR: 57 (15 Jul 2023 06:00) (54 - 63)  BP: 146/65 (15 Jul 2023 06:00) (136/64 - 184/73)  BP(mean): 94 (15 Jul 2023 06:00) (89 - 122)  RR: 24 (15 Jul 2023 06:00) (20 - 33)  SpO2: 95% (15 Jul 2023 06:00) (94% - 100%)    Parameters below as of 15 Jul 2023 07:00  Patient On (Oxygen Delivery Method): room air        I&O's Summary    13 Jul 2023 07:01  -  14 Jul 2023 07:00  --------------------------------------------------------  IN: 2957 mL / OUT: 2490 mL / NET: 467 mL    14 Jul 2023 07:01  -  15 Jul 2023 06:16  --------------------------------------------------------  IN: 2845.4 mL / OUT: 4207 mL / NET: -1361.6 mL        Physical Exam:  General Appearance: Appears well, NAD  Pulmonary: Nonlabored breathing, no respiratory distress  Cardiovascular: NSR  Abdomen: Soft, nondisteded, appropriate incisional tenderness, dressings clean and dry and intact  Extremities: WWP, SCD's in place     LABS:                        9.9    12.60 )-----------( 362      ( 15 Jul 2023 05:49 )             31.1     07-14    133<L>  |  100  |  20  ----------------------------<  127<H>  4.2   |  27  |  1.02    Ca    7.4<L>      14 Jul 2023 06:08  Phos  3.3     07-14  Mg     2.1     07-14        Urinalysis Basic - ( 14 Jul 2023 06:08 )    Color: x / Appearance: x / SG: x / pH: x  Gluc: 127 mg/dL / Ketone: x  / Bili: x / Urobili: x   Blood: x / Protein: x / Nitrite: x   Leuk Esterase: x / RBC: x / WBC x   Sq Epi: x / Non Sq Epi: x / Bacteria: x

## 2023-07-15 NOTE — PROGRESS NOTE ADULT - ASSESSMENT
78M S/P laparoscopic lysis of adhesions, converted to open lysis of adhesions, SBR x 3, temporary abdominal closure, S/P ex lap, removal of abthera, ileocolic resection, small bowel anastomosis c/b anastomotic leak resulting in wound dehiscence now POD8 RTOR exlap, washout, ileocolic resection with end ileostomy, blow hole colostomy, fistula, red rubber from ileostomy to small bowel anastomosis (removed); vicryl bridging mesh; R JOYCE below ileostomy, L JOYCE at small bowel enterotomy repair. Tolerating clears. Wound with increased bilious drainage and up trending WBC. Duplex US negative for DVT 7/13.     Plan:  - Speech and swallow to see  - Possible wound wash out under sedation   - Rest of care per SICU, plans discussed with team  - Team 1 will follow

## 2023-07-16 NOTE — PROGRESS NOTE ADULT - ASSESSMENT
78M S/P laparoscopic lysis of adhesions, converted to open lysis of adhesions, SBR x 3, temporary abdominal closure, S/P ex lap, removal of abthera, ileocolic resection, small bowel anastomosis c/b anastomotic leak resulting in wound dehiscence now POD8 RTOR exlap, washout, ileocolic resection with end ileostomy, blow hole colostomy, fistula, red rubber from ileostomy to small bowel anastomosis (removed); vicryl bridging mesh; R JOYCE below ileostomy, L JOYCE at small bowel enterotomy repair. Tolerating clears. Wound with increased bilious drainage and up trending WBC. Duplex US negative for DVT 7/13.     Plan:  - Wound vac change today  - Rest of care per SICU, plans discussed with team  - Team 1 will follow

## 2023-07-16 NOTE — PROGRESS NOTE ADULT - SUBJECTIVE AND OBJECTIVE BOX
ON: AMRITA, magic mouthwash per patient request x1    SUBJECTIVE: Patient seen and examined at bedside w/ attending. In good spirits, pain is well-controlled. Able to rhoda sips without n/v. Denies cp, sob, chills.    MEDICATIONS  (STANDING):  aMIOdarone    Tablet 200 milliGRAM(s) Oral daily  atorvastatin 20 milliGRAM(s) Oral at bedtime  chlorhexidine 2% Cloths 1 Application(s) Topical <User Schedule>  dextrose 5%. 1000 milliLiter(s) (100 mL/Hr) IV Continuous <Continuous>  dextrose 5%. 1000 milliLiter(s) (50 mL/Hr) IV Continuous <Continuous>  dextrose 50% Injectable 25 Gram(s) IV Push once  dextrose 50% Injectable 12.5 Gram(s) IV Push once  dextrose 50% Injectable 25 Gram(s) IV Push once  glucagon  Injectable 1 milliGRAM(s) IntraMuscular once  heparin   Injectable 5000 Unit(s) SubCutaneous every 8 hours  insulin lispro (ADMELOG) corrective regimen sliding scale   SubCutaneous every 6 hours  levothyroxine 50 MICROGram(s) Oral daily  lipid, fat emulsion (Fish Oil and Plant Based) 20% Infusion 0.5 Gm/kG/Day (20.83 mL/Hr) IV Continuous <Continuous>  losartan 25 milliGRAM(s) Oral daily  metoprolol tartrate 25 milliGRAM(s) Oral every 12 hours  octreotide  Injectable 100 MICROGram(s) IV Push every 8 hours  pantoprazole  Injectable 40 milliGRAM(s) IV Push daily  Parenteral Nutrition - Adult 1 Each (83 mL/Hr) TPN Continuous <Continuous>  Parenteral Nutrition - Adult 1 Each (83 mL/Hr) TPN Continuous <Continuous>  venlafaxine XR. 300 milliGRAM(s) Oral daily    MEDICATIONS  (PRN):  acetaminophen     Tablet .. 650 milliGRAM(s) Oral every 6 hours PRN Mild Pain (1 - 3)  benzocaine/menthol Lozenge 2 Lozenge Oral every 4 hours PRN Sore Throat  dextrose Oral Gel 15 Gram(s) Oral once PRN Blood Glucose LESS THAN 70 milliGRAM(s)/deciliter  hydrALAZINE Injectable 10 milliGRAM(s) IV Push every 6 hours PRN SBP > 160  melatonin 5 milliGRAM(s) Oral at bedtime PRN Sleep      Drips:     ICU Vital Signs Last 24 Hrs  T(C): 37.6 (16 Jul 2023 09:27), Max: 37.6 (15 Jul 2023 21:55)  T(F): 99.6 (16 Jul 2023 09:27), Max: 99.6 (15 Jul 2023 21:55)  HR: 64 (16 Jul 2023 12:00) (58 - 71)  BP: 132/60 (16 Jul 2023 12:00) (132/60 - 179/75)  BP(mean): 87 (16 Jul 2023 12:00) (87 - 119)  ABP: --  ABP(mean): --  RR: 24 (16 Jul 2023 12:00) (22 - 39)  SpO2: 94% (16 Jul 2023 12:00) (93% - 98%)    O2 Parameters below as of 16 Jul 2023 12:00  Patient On (Oxygen Delivery Method): room air            Physical Exam:  General: NAD  HEENT: NC/AT, EOMI, PERRLA, normal hearing, no oral lesions, neck supple w/o LAD  Pulmonary: Nonlabored breathing, no respiratory distress, CTA-B  Cardiovascular: NSR, no murmurs  Abdominal: soft, nondistended, nontender. Vac in place w/ good suction. R JOYCE w/ SS output. L JOYCE to wall suction w/ bilious output. Ostomy ppp w/ gas and stool in pouch.  Extremities: WWP, 5/5 strength x 4, no clubbing/cyanosis/edema  Neuro: A/O x3, CNs II-XII grossly intact, normal motor/sensation, no focal deficits  Pulses: palpable distal pulses      I&O's Summary    15 Jul 2023 07:01  -  16 Jul 2023 07:00  --------------------------------------------------------  IN: 2179.4 mL / OUT: 2244 mL / NET: -64.6 mL    16 Jul 2023 07:01  -  16 Jul 2023 12:15  --------------------------------------------------------  IN: 349 mL / OUT: 375 mL / NET: -26 mL        LABS:                        9.2    13.35 )-----------( 371      ( 16 Jul 2023 05:30 )             28.9     07-16    133<L>  |  101  |  23  ----------------------------<  132<H>  4.5   |  24  |  0.97    Ca    7.7<L>      16 Jul 2023 05:30  Phos  2.9     07-16  Mg     2.0     07-16        Urinalysis Basic - ( 16 Jul 2023 05:30 )    Color: x / Appearance: x / SG: x / pH: x  Gluc: 132 mg/dL / Ketone: x  / Bili: x / Urobili: x   Blood: x / Protein: x / Nitrite: x   Leuk Esterase: x / RBC: x / WBC x   Sq Epi: x / Non Sq Epi: x / Bacteria: x      CAPILLARY BLOOD GLUCOSE      POCT Blood Glucose.: 118 mg/dL (16 Jul 2023 11:01)  POCT Blood Glucose.: 112 mg/dL (16 Jul 2023 06:19)  POCT Blood Glucose.: 120 mg/dL (15 Jul 2023 23:28)  POCT Blood Glucose.: 98 mg/dL (15 Jul 2023 18:56)  POCT Blood Glucose.: 96 mg/dL (15 Jul 2023 13:06)        Cultures:    RADIOLOGY & ADDITIONAL STUDIES:

## 2023-07-16 NOTE — PROGRESS NOTE ADULT - SUBJECTIVE AND OBJECTIVE BOX
Feels well, no complaints.  AFVSS  Abd soft, ND, VAC on.  Stoma flush but not productive  R JOYCE SSF. L JOYCE bilious, VAC seems feculent?    A/P: s/p resection of ileocolic anastomosis, ileostomy and MF creation, repair of small bowel anastomosis with drainage.  Enteroatmospheric fistula.  1. VAC change today  2. Off abx  3. OOB, IS  4. TPN  5. NPO for possible microaspiration, reassess this week with Speech & Swallow.

## 2023-07-16 NOTE — PROGRESS NOTE ADULT - SUBJECTIVE AND OBJECTIVE BOX
SUBJECTIVE:  Patient was seen this AM at bedside with chief resident. Patient is doing well this AM. Denies any pain, nausea, or vomiting. Found lying in bed. Denies flatus or BM.    MEDICATIONS  (STANDING):  aMIOdarone    Tablet 200 milliGRAM(s) Oral daily  atorvastatin 20 milliGRAM(s) Oral at bedtime  chlorhexidine 2% Cloths 1 Application(s) Topical <User Schedule>  dextrose 5%. 1000 milliLiter(s) (100 mL/Hr) IV Continuous <Continuous>  dextrose 5%. 1000 milliLiter(s) (50 mL/Hr) IV Continuous <Continuous>  dextrose 50% Injectable 25 Gram(s) IV Push once  dextrose 50% Injectable 12.5 Gram(s) IV Push once  dextrose 50% Injectable 25 Gram(s) IV Push once  glucagon  Injectable 1 milliGRAM(s) IntraMuscular once  heparin   Injectable 5000 Unit(s) SubCutaneous every 8 hours  insulin lispro (ADMELOG) corrective regimen sliding scale   SubCutaneous every 6 hours  levothyroxine 50 MICROGram(s) Oral daily  lipid, fat emulsion (Fish Oil and Plant Based) 20% Infusion 0.5 Gm/kG/Day (20.83 mL/Hr) IV Continuous <Continuous>  losartan 25 milliGRAM(s) Oral daily  metoprolol tartrate 25 milliGRAM(s) Oral every 12 hours  octreotide  Injectable 100 MICROGram(s) IV Push every 8 hours  pantoprazole  Injectable 40 milliGRAM(s) IV Push daily  Parenteral Nutrition - Adult 1 Each (83 mL/Hr) TPN Continuous <Continuous>  Parenteral Nutrition - Adult 1 Each (83 mL/Hr) TPN Continuous <Continuous>  venlafaxine XR. 300 milliGRAM(s) Oral daily    MEDICATIONS  (PRN):  acetaminophen     Tablet .. 650 milliGRAM(s) Oral every 6 hours PRN Mild Pain (1 - 3)  benzocaine/menthol Lozenge 2 Lozenge Oral every 4 hours PRN Sore Throat  dextrose Oral Gel 15 Gram(s) Oral once PRN Blood Glucose LESS THAN 70 milliGRAM(s)/deciliter  hydrALAZINE Injectable 10 milliGRAM(s) IV Push every 6 hours PRN SBP > 160  melatonin 5 milliGRAM(s) Oral at bedtime PRN Sleep      Vital Signs Last 24 Hrs  T(C): 37.7 (16 Jul 2023 13:57), Max: 37.7 (16 Jul 2023 13:57)  T(F): 99.9 (16 Jul 2023 13:57), Max: 99.9 (16 Jul 2023 13:57)  HR: 61 (16 Jul 2023 14:00) (59 - 71)  BP: 140/84 (16 Jul 2023 14:00) (132/60 - 179/75)  BP(mean): 102 (16 Jul 2023 14:00) (87 - 119)  RR: 25 (16 Jul 2023 14:00) (22 - 35)  SpO2: 95% (16 Jul 2023 14:00) (93% - 98%)    Parameters below as of 16 Jul 2023 14:00  Patient On (Oxygen Delivery Method): room air        Physical Exam:  General: NAD, resting comfortably in bed  Pulmonary: Nonlabored breathing, no respiratory distress  Cardiovascular: NSR  Abdominal: soft, NT/ND, wound vac in place draining brown fluid, R JOYCE draining SS, L draining bilious  Extremities: WWP, normal strength  Neuro: A/O x 3, CNs II-XII grossly intact, no focal deficits, normal motor/sensation  Pulses: palpable distal pulses    I&O's Summary    15 Jul 2023 07:01  -  16 Jul 2023 07:00  --------------------------------------------------------  IN: 2241.9 mL / OUT: 2244 mL / NET: -2.1 mL    16 Jul 2023 07:01  -  16 Jul 2023 14:49  --------------------------------------------------------  IN: 993.5 mL / OUT: 835 mL / NET: 158.5 mL        LABS:                        9.2    13.35 )-----------( 371      ( 16 Jul 2023 05:30 )             28.9     07-16    133<L>  |  101  |  23  ----------------------------<  132<H>  4.5   |  24  |  0.97    Ca    7.7<L>      16 Jul 2023 05:30  Phos  2.9     07-16  Mg     2.0     07-16        Urinalysis Basic - ( 16 Jul 2023 05:30 )    Color: x / Appearance: x / SG: x / pH: x  Gluc: 132 mg/dL / Ketone: x  / Bili: x / Urobili: x   Blood: x / Protein: x / Nitrite: x   Leuk Esterase: x / RBC: x / WBC x   Sq Epi: x / Non Sq Epi: x / Bacteria: x      CAPILLARY BLOOD GLUCOSE      POCT Blood Glucose.: 118 mg/dL (16 Jul 2023 11:01)  POCT Blood Glucose.: 112 mg/dL (16 Jul 2023 06:19)  POCT Blood Glucose.: 120 mg/dL (15 Jul 2023 23:28)  POCT Blood Glucose.: 98 mg/dL (15 Jul 2023 18:56)        RADIOLOGY & ADDITIONAL STUDIES:

## 2023-07-16 NOTE — PROGRESS NOTE ADULT - SUBJECTIVE AND OBJECTIVE BOX
STATUS POST:  Exploratory laparotomy    Laparoscopic lysis of intestinal adhesions    Exploratory laparotomy    Open lysis of intestinal adhesions    Resection of small bowel    Temporary closure of abdominal cavity    Repair, anastomosis, ileocolic    Small bowel resection with anastomosis    Flap, myocutaneous, abdominal wall    Reconstruction of abdominal wall using mesh    Washout of abdominal cavity    Creation of ileostomy    Creation of colostomy    Laparoscopic lysis of intestinal adhesions    Open lysis of intestinal adhesions    Resection of small bowel    Temporary closure of abdominal cavity    Repair, anastomosis, ileocolic    Small bowel resection with anastomosis    Flap, myocutaneous, abdominal wall    Reconstruction of abdominal wall using mesh    Washout of abdominal cavity    SUBJECTIVE: Pt see and examined at the bedside by chief resident. Pt denies any new abd pain, nausea, vomiting, CP, SOB.    Vital Signs Last 24 Hrs  T(C): 37.7 (16 Jul 2023 13:57), Max: 37.7 (16 Jul 2023 13:57)  T(F): 99.9 (16 Jul 2023 13:57), Max: 99.9 (16 Jul 2023 13:57)  HR: 61 (16 Jul 2023 14:00) (59 - 71)  BP: 140/84 (16 Jul 2023 14:00) (132/60 - 179/75)  BP(mean): 102 (16 Jul 2023 14:00) (87 - 119)  RR: 25 (16 Jul 2023 14:00) (22 - 35)  SpO2: 95% (16 Jul 2023 14:00) (93% - 98%)    Parameters below as of 16 Jul 2023 14:00  Patient On (Oxygen Delivery Method): room air        I&O's Summary    15 Jul 2023 07:01  -  16 Jul 2023 07:00  --------------------------------------------------------  IN: 2241.9 mL / OUT: 2244 mL / NET: -2.1 mL    16 Jul 2023 07:01  -  16 Jul 2023 14:35  --------------------------------------------------------  IN: 993.5 mL / OUT: 835 mL / NET: 158.5 mL        Physical Exam:  General Appearance: NAD  Pulmonary: Nonlabored breathing, no respiratory distress  Cardiovascular: NSR  Abdomen: Soft, nondisteded, wound vac in place,   Extremities: WWP, SCD's in place, no edema    LABS:                        9.2    13.35 )-----------( 371      ( 16 Jul 2023 05:30 )             28.9     07-16    133<L>  |  101  |  23  ----------------------------<  132<H>  4.5   |  24  |  0.97    Ca    7.7<L>      16 Jul 2023 05:30  Phos  2.9     07-16  Mg     2.0     07-16        Urinalysis Basic - ( 16 Jul 2023 05:30 )    Color: x / Appearance: x / SG: x / pH: x  Gluc: 132 mg/dL / Ketone: x  / Bili: x / Urobili: x   Blood: x / Protein: x / Nitrite: x   Leuk Esterase: x / RBC: x / WBC x   Sq Epi: x / Non Sq Epi: x / Bacteria: x

## 2023-07-16 NOTE — PROGRESS NOTE ADULT - ASSESSMENT
78M S/P laparoscopic lysis of adhesions, converted to open lysis of adhesions, SBR x 3, temporary abdominal closure, S/P ex lap, removal of abthera, ileocolic resection, small bowel anastomosis c/b anastomotic leak resulting in wound dehiscence now POD8 RTOR exlap, washout, ileocolic resection with end ileostomy, blow hole colostomy, fistula, red rubber from ileostomy to small bowel anastomosis (removed); vicryl bridging mesh; R JOYCE below ileostomy, L JOYCE at small bowel enterotomy repair. Tolerating clears. Wound with increased bilious drainage and up trending WBC. Duplex US negative for DVT 7/13.     Plan:  - Possible wound wash out under sedation   - Rest of care per SICU, plans discussed with team     78M S/P laparoscopic lysis of adhesions, converted to open lysis of adhesions, SBR x 3, temporary abdominal closure, S/P ex lap, removal of abthera, ileocolic resection, small bowel anastomosis c/b anastomotic leak resulting in wound dehiscence now POD8 RTOR exlap, washout, ileocolic resection with end ileostomy, blow hole colostomy, fistula, red rubber from ileostomy to small bowel anastomosis (removed); vicryl bridging mesh; R JOYCE below ileostomy, L JOYCE at small bowel enterotomy repair. Tolerating clears. Wound with increased bilious drainage and up trending WBC. Duplex US negative for DVT 7/13.     Plan:  - Wound vac change today  - Rest of care per SICU, plans discussed with team

## 2023-07-16 NOTE — PROGRESS NOTE ADULT - ASSESSMENT
Cont current Rx  Increase losartan for better BP control  Will discuss with surgery after VAC change

## 2023-07-16 NOTE — PROGRESS NOTE ADULT - SUBJECTIVE AND OBJECTIVE BOX
Awake and alert VS stable Afeb In RR Clear chest ant  Edema slightly decreased  WBCs slightly up creatinine and lytes ok

## 2023-07-16 NOTE — PROGRESS NOTE ADULT - ASSESSMENT
78M w/ Crohn's, AFib/Flutter s/p DCCVs on amiodarone, remote ileocectomy and open appy here for SBO vs Crohns flare, s/p NGT decompression and now s/p lap TRE converted to open TRE, SBR x 3, left in discontinuity with abthera vac on 6/27, RTOR for ileocolic resection, small bowel anastomosis, and abdominal wall closure on 6/28, and s/p IR aspiration of perihepatic fluid on 7/3, stepped down to telemetery on 7/4. wound dehisence on 7/5, RTOR exlap, washout, ileocolic resection with end ileostomy, blow hole colostomy, red rubber from ileostomy to small bowel anastomosis; vicryl bridging mesh on 7/5. Transferred to SICU post op for HD monitoring. Extubated 7/6.    Neuro: Tylenol PRN for pain. Hx anxiety: Continue venlafaxine  CV: Maitain MAP > 65. Hx of Afib/flutter: s/p DCCV, on PO amiodarone, metoprolol. Hx of HTN- continue home losartan and continue hydralazine IV PRN for SBOP < 160. Hx of HLD: continue home rosuvastatin; TTE (04/22) - PASP 50mmHg, EF 69%. Diuresis with 40 Lasix today  Pulm: Extubated 7/6 - now on RA saturating well. s/p Chest tube by CT surg (6/27--7/3)   GI: NPO - pending SLP evaluation. TPN/lipids, wound dehisense -- RTOR on 7/5 for exlap, washout, ileocolic resection with end ileostomy, blow hole colostomy, red rubber from ileostomy to small bowel anastomosis; vicryl bridging mesh - continue TID dressing changes, Continue Octreotide.   : Voids.   ID: Perforated viscus, IR Cx 7/3: C. tertium, Lactobacillis. OR Cx 7/5 - rare yeast - Imipenem (6/30-7/12), Fluconazole (6/30 -7/12) OR Cx 6/28: Vanc-resistant/amp-sens (but allergic) enterococcus s/p CTX (6/26-6/30), Flagyl (6/26-6/30), Dapto (6/30-7/5). WBC downtrending today. Monitor BCx  Endo: ISS; Hx of hypothyroid: cont synthroid IV. Gout: holding home allopurionol  PPx: SCD, HSQ. LE duplex 7/13 neg.  Lines: PIV x 2, RUE PICC (6/30--), Victoria (7/8-7/10) // DC: R Chest tube for iatrogenic PTX (6/27--7/3), R TLC (06/26-30). New R TLC (7/5 -7/12)  Wounds/Drains: wound van change q48hr next today; R JOYCE below ileostomy, L JOYCE at small bowel enterotomy repair.  PT/OT: not ordered  Dispo: SICU

## 2023-07-17 NOTE — PROGRESS NOTE ADULT - ASSESSMENT
78M S/P laparoscopic lysis of adhesions, converted to open lysis of adhesions, SBR x 3, temporary abdominal closure, S/P ex lap, removal of abthera, ileocolic resection, small bowel anastomosis c/b anastomotic leak resulting in wound dehiscence now POD8 RTOR exlap, washout, ileocolic resection with end ileostomy, blow hole colostomy, fistula, red rubber from ileostomy to small bowel anastomosis (removed); vicryl bridging mesh; R JOYCE below ileostomy, L JOYCE at small bowel enterotomy repair. Midline wound with vac in place, enteric content and bile noted on cannister. Cleared by speech and swallow team on 7/14 "diet per surgical team; no restrictions at this time from SLP standpoint"    - Start clears   - Wound vac change every 2 days, next 7/18  - Plan discussed with Dr Peterson

## 2023-07-17 NOTE — PROGRESS NOTE ADULT - SUBJECTIVE AND OBJECTIVE BOX
INTERVAL HPI/OVERNIGHT EVENTS: AMRITA, pos 400 @ 5am    STATUS POST:    6/27: Laparoscopic lysis of adhesions, converted to open lysis of adhesions, SBR x 3, temporary abdominal closure, 5L cryst, 1L 5% alb, 3 pRBC, 1 FFP, 1 plts  6/28: ex lap, removal of abthera, ileocolic resection, small bowel anastamosis, , 1.6 crystalloid, 250 5%, 175 uop   7/3: IR Gendel drained perihepatic aspiration of serous fluid.   7/5: RTOR exlap, washout, ileocolic resection with end ileostomy, blow hole colostomy, fistula, red rubber from ileostomy to small bowel anastomosis; vicryl bridging mesh; R JOYCE below ileostomy, L JOYCE at small bowel enterotomy repair; 500 LR, 500 5% albumin, 3u PRBC, 2 FFP, 400 UOP,     SUBJECTIVE: Patient seen and examined at bedside. In good spirits, tolerating vac, abdominal pain well-controlled. Able to rhoda sips without n/v. Denies cp, sob.      aMIOdarone    Tablet 200 milliGRAM(s) Oral daily  heparin   Injectable 5000 Unit(s) SubCutaneous every 8 hours  hydrALAZINE Injectable 10 milliGRAM(s) IV Push every 6 hours PRN  losartan 25 milliGRAM(s) Oral daily  metoprolol tartrate 25 milliGRAM(s) Oral every 12 hours      Vital Signs Last 24 Hrs  T(C): 37 (17 Jul 2023 06:05), Max: 37.7 (16 Jul 2023 13:57)  T(F): 98.6 (17 Jul 2023 06:05), Max: 99.9 (16 Jul 2023 13:57)  HR: 75 (17 Jul 2023 07:00) (61 - 76)  BP: 139/92 (17 Jul 2023 07:00) (131/84 - 171/72)  BP(mean): 106 (17 Jul 2023 07:00) (87 - 117)  RR: 49 (17 Jul 2023 07:00) (18 - 49)  SpO2: 93% (17 Jul 2023 07:00) (92% - 98%)    Parameters below as of 17 Jul 2023 07:00  Patient On (Oxygen Delivery Method): room air      I&O's Detail    16 Jul 2023 07:01  -  17 Jul 2023 07:00  --------------------------------------------------------  IN:    Fat Emulsion (Fish Oil &amp; Plant Based) 20% Infusion: 291.2 mL    IV PiggyBack: 187.5 mL    Oral Fluid: 225 mL    TPN (Total Parenteral Nutrition): 1992 mL  Total IN: 2695.7 mL    OUT:    Bulb (mL): 220 mL    Bulb (mL): 0 mL    Ileostomy (mL): 35 mL    VAC (Vacuum Assisted Closure) System (mL): 810 mL    Voided (mL): 1525 mL  Total OUT: 2590 mL    Total NET: 105.7 mL      17 Jul 2023 07:01  -  17 Jul 2023 07:35  --------------------------------------------------------  IN:    TPN (Total Parenteral Nutrition): 83 mL  Total IN: 83 mL    OUT:    Fat Emulsion (Fish Oil &amp; Plant Based) 20% Infusion: 0 mL  Total OUT: 0 mL    Total NET: 83 mL          General: NAD, resting comfortably in bed  C/V: NSR  Pulm: Nonlabored breathing, no respiratory distress  Abd: soft, nondistended, midline laparotomy w/ vac in place w/ good suction, brown enteric output in cannister. Ostomy ppp w/ gas and brown output in pouch. Mucous fistula appreciated. R JOYCE w/ scant serous outout, L JOYCE to wall suction w/ bilious output.  Extrem: WWP, no edema, SCDs in place        LABS:                        8.8    11.46 )-----------( 322      ( 17 Jul 2023 05:30 )             27.7     07-17    132<L>  |  101  |  22  ----------------------------<  124<H>  4.4   |  24  |  0.97    Ca    7.6<L>      17 Jul 2023 05:30  Phos  3.1     07-17  Mg     2.0     07-17        Urinalysis Basic - ( 17 Jul 2023 05:30 )    Color: x / Appearance: x / SG: x / pH: x  Gluc: 124 mg/dL / Ketone: x  / Bili: x / Urobili: x   Blood: x / Protein: x / Nitrite: x   Leuk Esterase: x / RBC: x / WBC x   Sq Epi: x / Non Sq Epi: x / Bacteria: x        RADIOLOGY & ADDITIONAL STUDIES:

## 2023-07-17 NOTE — ADVANCED PRACTICE NURSE CONSULT - ASSESSMENT
Pt with spouse present. Pt requested education be performed tomorrow since he had just finished his PT.

## 2023-07-17 NOTE — PROGRESS NOTE ADULT - SUBJECTIVE AND OBJECTIVE BOX
Pt seen and examined   out of bed  on liquid det  voiding    REVIEW OF SYSTEMS:  Constitutional: No fever,   Cardiovascular: No chest pain, palpitations,   Gastrointestinal: No abdominal or epigastric pain. No nausea, vomiting   Skin: No itching, burning, rashes or lesions       MEDICATIONS:  MEDICATIONS  (STANDING):  aMIOdarone    Tablet 200 milliGRAM(s) Oral daily  atorvastatin 20 milliGRAM(s) Oral at bedtime  chlorhexidine 2% Cloths 1 Application(s) Topical <User Schedule>  dextrose 5%. 1000 milliLiter(s) (50 mL/Hr) IV Continuous <Continuous>  dextrose 5%. 1000 milliLiter(s) (100 mL/Hr) IV Continuous <Continuous>  dextrose 50% Injectable 25 Gram(s) IV Push once  dextrose 50% Injectable 12.5 Gram(s) IV Push once  dextrose 50% Injectable 25 Gram(s) IV Push once  glucagon  Injectable 1 milliGRAM(s) IntraMuscular once  heparin   Injectable 5000 Unit(s) SubCutaneous every 8 hours  insulin lispro (ADMELOG) corrective regimen sliding scale   SubCutaneous every 6 hours  levothyroxine 50 MICROGram(s) Oral daily  lipid, fat emulsion (Fish Oil and Plant Based) 20% Infusion 0.5 Gm/kG/Day (20.83 mL/Hr) IV Continuous <Continuous>  losartan 25 milliGRAM(s) Oral daily  metoprolol tartrate 25 milliGRAM(s) Oral every 12 hours  octreotide  Injectable 100 MICROGram(s) IV Push every 8 hours  pantoprazole  Injectable 40 milliGRAM(s) IV Push daily  Parenteral Nutrition - Adult 1 Each (83 mL/Hr) TPN Continuous <Continuous>  venlafaxine XR. 300 milliGRAM(s) Oral daily    MEDICATIONS  (PRN):  acetaminophen     Tablet .. 650 milliGRAM(s) Oral every 6 hours PRN Mild Pain (1 - 3)  benzocaine/menthol Lozenge 2 Lozenge Oral every 4 hours PRN Sore Throat  dextrose Oral Gel 15 Gram(s) Oral once PRN Blood Glucose LESS THAN 70 milliGRAM(s)/deciliter  hydrALAZINE Injectable 10 milliGRAM(s) IV Push every 6 hours PRN SBP > 160  melatonin 5 milliGRAM(s) Oral at bedtime PRN Sleep      Allergies    penicillin (Angioedema)    Intolerances        Vital Signs Last 24 Hrs  T(C): 37 (17 Jul 2023 09:00), Max: 37.7 (16 Jul 2023 13:57)  T(F): 98.6 (17 Jul 2023 09:00), Max: 99.9 (16 Jul 2023 13:57)  HR: 62 (17 Jul 2023 10:00) (61 - 76)  BP: 160/70 (17 Jul 2023 10:00) (116/58 - 171/72)  BP(mean): 101 (17 Jul 2023 10:00) (78 - 117)  RR: 24 (17 Jul 2023 10:00) (18 - 49)  SpO2: 98% (17 Jul 2023 10:00) (92% - 98%)    Parameters below as of 17 Jul 2023 10:00  Patient On (Oxygen Delivery Method): room air        07-16 @ 07:01  -  07-17 @ 07:00  --------------------------------------------------------  IN: 2695.7 mL / OUT: 2590 mL / NET: 105.7 mL    07-17 @ 07:01  -  07-17 @ 11:01  --------------------------------------------------------  IN: 249 mL / OUT: 0 mL / NET: 249 mL        PHYSICAL EXAM:    General:  in no acute distress  HEENT: MMM, conjunctiva and sclera clear  Gastrointestinal: non-distended; ileostomy functioning  Skin: Warm and dry. No obvious rash    LABS:      CBC Full  -  ( 17 Jul 2023 05:30 )  WBC Count : 11.46 K/uL  RBC Count : 2.91 M/uL  Hemoglobin : 8.8 g/dL  Hematocrit : 27.7 %  Platelet Count - Automated : 322 K/uL  Mean Cell Volume : 95.2 fl  Mean Cell Hemoglobin : 30.2 pg  Mean Cell Hemoglobin Concentration : 31.8 gm/dL  Auto Neutrophil # : 9.43 K/uL  Auto Lymphocyte # : 0.71 K/uL  Auto Monocyte # : 0.71 K/uL  Auto Eosinophil # : 0.30 K/uL  Auto Basophil # : 0.00 K/uL  Auto Neutrophil % : 78.8 %  Auto Lymphocyte % : 6.2 %  Auto Monocyte % : 6.2 %  Auto Eosinophil % : 2.6 %  Auto Basophil % : 0.0 %    07-17    132<L>  |  101  |  22  ----------------------------<  124<H>  4.4   |  24  |  0.97    Ca    7.6<L>      17 Jul 2023 05:30  Phos  3.1     07-17  Mg     2.0     07-17            Urinalysis Basic - ( 17 Jul 2023 05:30 )    Color: x / Appearance: x / SG: x / pH: x  Gluc: 124 mg/dL / Ketone: x  / Bili: x / Urobili: x   Blood: x / Protein: x / Nitrite: x   Leuk Esterase: x / RBC: x / WBC x   Sq Epi: x / Non Sq Epi: x / Bacteria: x                RADIOLOGY & ADDITIONAL STUDIES (The following images were personally reviewed):

## 2023-07-17 NOTE — PROGRESS NOTE ADULT - ATTENDING COMMENTS
AF, Crohns, s/p SBR with anastomotic leak, ileostomy, ileocolic resection, blowhole colostomy  physical as above  continue vac changes  amiodarone/metoprolol/losartan with better BP control  continue TPN  doing better with PT  continue diuresis

## 2023-07-17 NOTE — PROGRESS NOTE ADULT - SUBJECTIVE AND OBJECTIVE BOX
ON: AMRITA, pos 400 @ 5am      SUBJECTIVE: Pt seen and examined at bedside this am by ICU team. Patient is lying comfortably in bed with no acute complaints. Denies abdominal pain. Denies nausea, emesis. States he feels well.    MEDICATIONS  (STANDING):  albumin human 25% IVPB 50 milliLiter(s) IV Intermittent every 8 hours  aMIOdarone    Tablet 200 milliGRAM(s) Oral daily  atorvastatin 20 milliGRAM(s) Oral at bedtime  chlorhexidine 2% Cloths 1 Application(s) Topical <User Schedule>  dextrose 5%. 1000 milliLiter(s) (100 mL/Hr) IV Continuous <Continuous>  dextrose 5%. 1000 milliLiter(s) (50 mL/Hr) IV Continuous <Continuous>  dextrose 50% Injectable 25 Gram(s) IV Push once  dextrose 50% Injectable 12.5 Gram(s) IV Push once  dextrose 50% Injectable 25 Gram(s) IV Push once  glucagon  Injectable 1 milliGRAM(s) IntraMuscular once  heparin   Injectable 5000 Unit(s) SubCutaneous every 8 hours  insulin lispro (ADMELOG) corrective regimen sliding scale   SubCutaneous every 6 hours  levothyroxine 50 MICROGram(s) Oral daily  lipid, fat emulsion (Fish Oil and Plant Based) 20% Infusion 0.5 Gm/kG/Day (20.83 mL/Hr) IV Continuous <Continuous>  losartan 25 milliGRAM(s) Oral daily  metoprolol tartrate 25 milliGRAM(s) Oral every 12 hours  octreotide  Injectable 100 MICROGram(s) IV Push every 8 hours  pantoprazole  Injectable 40 milliGRAM(s) IV Push daily  Parenteral Nutrition - Adult 1 Each (83 mL/Hr) TPN Continuous <Continuous>  Parenteral Nutrition - Adult 1 Each (83 mL/Hr) TPN Continuous <Continuous>  venlafaxine XR. 300 milliGRAM(s) Oral daily    MEDICATIONS  (PRN):  acetaminophen     Tablet .. 650 milliGRAM(s) Oral every 6 hours PRN Mild Pain (1 - 3)  benzocaine/menthol Lozenge 2 Lozenge Oral every 4 hours PRN Sore Throat  dextrose Oral Gel 15 Gram(s) Oral once PRN Blood Glucose LESS THAN 70 milliGRAM(s)/deciliter  hydrALAZINE Injectable 10 milliGRAM(s) IV Push every 6 hours PRN SBP > 160  melatonin 5 milliGRAM(s) Oral at bedtime PRN Sleep      Drips: N/A    ICU Vital Signs Last 24 Hrs  T(C): 37 (17 Jul 2023 09:00), Max: 37.4 (17 Jul 2023 02:05)  T(F): 98.6 (17 Jul 2023 09:00), Max: 99.3 (17 Jul 2023 02:05)  HR: 71 (17 Jul 2023 15:14) (59 - 76)  BP: 137/91 (17 Jul 2023 15:14) (116/58 - 174/64)  BP(mean): 109 (17 Jul 2023 15:14) (78 - 117)  ABP: --  ABP(mean): --  RR: 36 (17 Jul 2023 15:14) (22 - 49)  SpO2: 97% (17 Jul 2023 15:14) (92% - 99%)    O2 Parameters below as of 17 Jul 2023 15:14  Patient On (Oxygen Delivery Method): room air            Physical Exam:  General: NAD  HEENT: NC/AT, EOMI, PERRLA, normal hearing, no oral lesions, neck supple w/o LAD  Pulmonary: Nonlabored breathing, no respiratory distress, CTA-B  Cardiovascular: NSR, no murmurs  Abdominal: soft, nondistended, nontender. Midline wound vac in place with brown output. JOYCE on L noted with suction with bilious output. Ostomy noted to be pinker than prior and patent with minimal dark brown output  Extremities: WWP, 5/5 strength x 4, 2+ edema noted to level of mid calf  Neuro: A/O x3, CNs II-XII grossly intact, normal motor/sensation, no focal deficits  Pulses: palpable distal pulses      I&O's Summary    16 Jul 2023 07:01  -  17 Jul 2023 07:00  --------------------------------------------------------  IN: 2695.7 mL / OUT: 2590 mL / NET: 105.7 mL    17 Jul 2023 07:01  -  17 Jul 2023 16:34  --------------------------------------------------------  IN: 747 mL / OUT: 2125 mL / NET: -1378 mL        LABS:                        8.8    11.46 )-----------( 322      ( 17 Jul 2023 05:30 )             27.7     07-17    132<L>  |  101  |  22  ----------------------------<  124<H>  4.4   |  24  |  0.97    Ca    7.6<L>      17 Jul 2023 05:30  Phos  3.1     07-17  Mg     2.0     07-17    TPro  x   /  Alb  2.2<L>  /  TBili  x   /  DBili  x   /  AST  x   /  ALT  x   /  AlkPhos  x   07-17      Urinalysis Basic - ( 17 Jul 2023 05:30 )    Color: x / Appearance: x / SG: x / pH: x  Gluc: 124 mg/dL / Ketone: x  / Bili: x / Urobili: x   Blood: x / Protein: x / Nitrite: x   Leuk Esterase: x / RBC: x / WBC x   Sq Epi: x / Non Sq Epi: x / Bacteria: x      CAPILLARY BLOOD GLUCOSE      POCT Blood Glucose.: 106 mg/dL (17 Jul 2023 10:41)  POCT Blood Glucose.: 112 mg/dL (17 Jul 2023 05:57)  POCT Blood Glucose.: 103 mg/dL (16 Jul 2023 23:33)    LIVER FUNCTIONS - ( 17 Jul 2023 05:30 )  Alb: 2.2 g/dL / Pro: x     / ALK PHOS: x     / ALT: x     / AST: x     / GGT: x             Cultures:    RADIOLOGY & ADDITIONAL STUDIES:

## 2023-07-17 NOTE — PROGRESS NOTE ADULT - ASSESSMENT
78M POD20 laparoscopic lysis of adhesions, converted to open lysis of adhesions, SBR x 3, temporary abdominal closure, POD19 ex lap, removal of abthera, ileocolic resection, small bowel anastomosis c/b anastomotic leak resulting in wound dehiscence now POD12 RTOR exlap, washout, ileocolic resection with end ileostomy, blow hole colostomy, fistula, red rubber from ileostomy to small bowel anastomosis; vicryl bridging mesh; R JOYCE below ileostomy, L JOYCE at small bowel enterotomy repair.    Plan:  - Vac change tomorrow  - Appreciate excellent SICU care  - Surgery team 4c will continue to follow

## 2023-07-17 NOTE — PROGRESS NOTE ADULT - SUBJECTIVE AND OBJECTIVE BOX
SUBJECTIVE: Patient doing well seen during morning rounds. Wound vac holding suction.     MEDICATIONS  (STANDING):  aMIOdarone    Tablet 200 milliGRAM(s) Oral daily  atorvastatin 20 milliGRAM(s) Oral at bedtime  chlorhexidine 2% Cloths 1 Application(s) Topical <User Schedule>  dextrose 5%. 1000 milliLiter(s) (100 mL/Hr) IV Continuous <Continuous>  dextrose 5%. 1000 milliLiter(s) (50 mL/Hr) IV Continuous <Continuous>  dextrose 50% Injectable 25 Gram(s) IV Push once  dextrose 50% Injectable 12.5 Gram(s) IV Push once  dextrose 50% Injectable 25 Gram(s) IV Push once  glucagon  Injectable 1 milliGRAM(s) IntraMuscular once  heparin   Injectable 5000 Unit(s) SubCutaneous every 8 hours  insulin lispro (ADMELOG) corrective regimen sliding scale   SubCutaneous every 6 hours  levothyroxine 50 MICROGram(s) Oral daily  lipid, fat emulsion (Fish Oil and Plant Based) 20% Infusion 0.5 Gm/kG/Day (20.83 mL/Hr) IV Continuous <Continuous>  losartan 25 milliGRAM(s) Oral daily  metoprolol tartrate 25 milliGRAM(s) Oral every 12 hours  octreotide  Injectable 100 MICROGram(s) IV Push every 8 hours  pantoprazole  Injectable 40 milliGRAM(s) IV Push daily  Parenteral Nutrition - Adult 1 Each (83 mL/Hr) TPN Continuous <Continuous>  venlafaxine XR. 300 milliGRAM(s) Oral daily    MEDICATIONS  (PRN):  acetaminophen     Tablet .. 650 milliGRAM(s) Oral every 6 hours PRN Mild Pain (1 - 3)  benzocaine/menthol Lozenge 2 Lozenge Oral every 4 hours PRN Sore Throat  dextrose Oral Gel 15 Gram(s) Oral once PRN Blood Glucose LESS THAN 70 milliGRAM(s)/deciliter  hydrALAZINE Injectable 10 milliGRAM(s) IV Push every 6 hours PRN SBP > 160  melatonin 5 milliGRAM(s) Oral at bedtime PRN Sleep      Drips:     ICU Vital Signs Last 24 Hrs  T(C): 37 (17 Jul 2023 09:00), Max: 37.7 (16 Jul 2023 13:57)  T(F): 98.6 (17 Jul 2023 09:00), Max: 99.9 (16 Jul 2023 13:57)  HR: 61 (17 Jul 2023 09:00) (61 - 76)  BP: 116/58 (17 Jul 2023 09:00) (116/58 - 171/72)  BP(mean): 78 (17 Jul 2023 09:00) (78 - 117)  ABP: --  ABP(mean): --  RR: 22 (17 Jul 2023 09:00) (18 - 49)  SpO2: 95% (17 Jul 2023 09:00) (92% - 98%)    O2 Parameters below as of 17 Jul 2023 10:00  Patient On (Oxygen Delivery Method): room air          Physical exam  General: NAD, resting comfortably in bed  C/V: NSR  Pulm: Nonlabored breathing, no respiratory distress  Abd: soft, nondistended, midline laparotomy w/ vac in place w/ good suction, brown enteric output in cannister. Ostomy ppp w/ gas and brown output in pouch. Mucous fistula appreciated. R JOYCE w/ scant serous outout, L JOYCE to wall suction w/ bilious output.  Extrem: WWP, no edema, SCDs in place      I&O's Summary    16 Jul 2023 07:01  -  17 Jul 2023 07:00  --------------------------------------------------------  IN: 2695.7 mL / OUT: 2590 mL / NET: 105.7 mL    17 Jul 2023 07:01  -  17 Jul 2023 10:03  --------------------------------------------------------  IN: 249 mL / OUT: 0 mL / NET: 249 mL        LABS:                        8.8    11.46 )-----------( 322      ( 17 Jul 2023 05:30 )             27.7     07-17    132<L>  |  101  |  22  ----------------------------<  124<H>  4.4   |  24  |  0.97    Ca    7.6<L>      17 Jul 2023 05:30  Phos  3.1     07-17  Mg     2.0     07-17        Urinalysis Basic - ( 17 Jul 2023 05:30 )    Color: x / Appearance: x / SG: x / pH: x  Gluc: 124 mg/dL / Ketone: x  / Bili: x / Urobili: x   Blood: x / Protein: x / Nitrite: x   Leuk Esterase: x / RBC: x / WBC x   Sq Epi: x / Non Sq Epi: x / Bacteria: x      CAPILLARY BLOOD GLUCOSE      POCT Blood Glucose.: 112 mg/dL (17 Jul 2023 05:57)  POCT Blood Glucose.: 103 mg/dL (16 Jul 2023 23:33)  POCT Blood Glucose.: 120 mg/dL (16 Jul 2023 15:32)  POCT Blood Glucose.: 118 mg/dL (16 Jul 2023 11:01)

## 2023-07-17 NOTE — PROGRESS NOTE ADULT - ASSESSMENT
78M w/ Crohn's, AFib/Flutter s/p DCCVs on amiodarone, remote ileocectomy and open appy here for SBO vs Crohns flare, s/p NGT decompression and now s/p lap TRE converted to open TRE, SBR x 3, left in discontinuity with abthera vac on 6/27, RTOR for ileocolic resection, small bowel anastomosis, and abdominal wall closure on 6/28, and s/p IR aspiration of perihepatic fluid on 7/3, stepped down to telemetery on 7/4. wound dehisence on 7/5, RTOR exlap, washout, ileocolic resection with end ileostomy, blow hole colostomy, red rubber from ileostomy to small bowel anastomosis; vicryl bridging mesh on 7/5. Transferred to SICU post op for HD monitoring. Extubated 7/6.    Neuro: Tylenol PRN for pain. Hx anxiety: Continue venlafaxine  CV: Maitain MAP > 65. Hx of Afib/flutter: s/p DCCV, on PO amiodarone, metoprolol. Hx of HTN- continue home losartan and continue hydralazine IV PRN for SBP < 160. Hx of HLD: continue home rosuvastatin; TTE (04/22) - PASP 50mmHg, EF 69%. Clinically volume overloaded - albumin 2.2 - start 25% albumin 50q8 and diuresis with 40 lasix.  Pulm: Extubated 7/6 - now on RA saturating well. s/p Chest tube by CT surg (6/27--7/3)   GI: CLD. TPN/lipids, wound dehisense -- RTOR on 7/5 for exlap, washout, ileocolic resection with end ileostomy, blow hole colostomy, red rubber from ileostomy to small bowel anastomosis; vicryl bridging mesh - vac placed 7/16, Continue Octreotide.   : Voids.   ID: Perforated viscus, IR Cx 7/3: C. tertium, Lactobacillis. OR Cx 7/5 - rare yeast - Imipenem (6/30-7/12), Fluconazole (6/30 -7/12) OR Cx 6/28: Vanc-resistant/amp-sens (but allergic) enterococcus s/p CTX (6/26-6/30), Flagyl (6/26-6/30), Dapto (6/30-7/5). WBC downtrending today. Monitor BCx - NGTD  Endo: ISS; Hx of hypothyroid: cont synthroid IV. Gout: holding home allopurionol  PPx: SCD, HSQ. LE duplex 7/13 neg.  Lines: PIV x 2, RUE PICC (6/30--), [place] (7/8-7/10) // DC: R Chest tube for iatrogenic PTX (6/27--7/3), R TLC (06/26-30). New R TLC (7/5 -7/12)  Wounds/Drains: midline incision; wound vac changes q48h; R JOYCE below ileostomy (D/C 7/17), L JOYCE at small bowel enterotomy repair.   PT/OT: not ordered  Dispo: SICU

## 2023-07-18 NOTE — PROGRESS NOTE ADULT - SUBJECTIVE AND OBJECTIVE BOX
INTERVAL HPI/OVERNIGHT EVENTS: started cholestyramine. Echo ____. lasix 40, norvasc 5. POCUS - minimal R consolidation, A-B pattern at apices. Vac changed.    STATUS POST:    6/27: Laparoscopic lysis of adhesions, converted to open lysis of adhesions, SBR x 3, temporary abdominal closure, 5L cryst, 1L 5% alb, 3 pRBC, 1 FFP, 1 plts  6/28: ex lap, removal of abthera, ileocolic resection, small bowel anastamosis, , 1.6 crystalloid, 250 5%, 175 uop   7/3: IR Gendel drained perihepatic aspiration of serous fluid.   7/5: RTOR exlap, washout, ileocolic resection with end ileostomy, blow hole colostomy, fistula, red rubber from ileostomy to small bowel anastomosis; vicryl bridging mesh; R JOYCE below ileostomy, L JOYCE at small bowel enterotomy repair; 500 LR, 500 5% albumin, 3u PRBC, 2 FFP, 400 UOP,     SUBJECTIVE: Patient seen and examined at bedside. In good spirits, able to rhoda CLD without n/v. Denies cp, sob.      aMIOdarone    Tablet 200 milliGRAM(s) Oral daily  amLODIPine   Tablet 5 milliGRAM(s) Oral daily  heparin   Injectable 5000 Unit(s) SubCutaneous every 8 hours  hydrALAZINE Injectable 10 milliGRAM(s) IV Push every 6 hours PRN  losartan 25 milliGRAM(s) Oral daily  metoprolol tartrate 25 milliGRAM(s) Oral every 12 hours      Vital Signs Last 24 Hrs  T(C): 37.3 (18 Jul 2023 14:00), Max: 37.4 (18 Jul 2023 09:00)  T(F): 99.1 (18 Jul 2023 14:00), Max: 99.3 (18 Jul 2023 09:00)  HR: 63 (18 Jul 2023 15:00) (61 - 85)  BP: 116/56 (18 Jul 2023 15:00) (105/61 - 181/74)  BP(mean): 80 (18 Jul 2023 15:00) (75 - 118)  RR: 25 (18 Jul 2023 15:00) (23 - 40)  SpO2: 96% (18 Jul 2023 15:00) (81% - 99%)    Parameters below as of 18 Jul 2023 15:00  Patient On (Oxygen Delivery Method): room air      I&O's Detail    17 Jul 2023 07:01  -  18 Jul 2023 07:00  --------------------------------------------------------  IN:    Albumin 25%  -  50 mL: 150 mL    Fat Emulsion (Fish Oil &amp; Plant Based) 20% Infusion: 270.4 mL    IV PiggyBack: 100 mL    TPN (Total Parenteral Nutrition): 1992 mL  Total IN: 2512.4 mL    OUT:    Bulb (mL): 405 mL    Fat Emulsion (Fish Oil &amp; Plant Based) 20% Infusion: 0 mL    Ileostomy (mL): 29 mL    VAC (Vacuum Assisted Closure) System (mL): 720 mL    Voided (mL): 2665 mL  Total OUT: 3819 mL    Total NET: -1306.6 mL      18 Jul 2023 07:01  -  18 Jul 2023 16:25  --------------------------------------------------------  IN:    IV PiggyBack: 100 mL    IV PiggyBack: 50 mL    TPN (Total Parenteral Nutrition): 498 mL  Total IN: 648 mL    OUT:    Bulb (mL): 0 mL    Ileostomy (mL): 0 mL    VAC (Vacuum Assisted Closure) System (mL): 200 mL    Voided (mL): 820 mL  Total OUT: 1020 mL    Total NET: -372 mL          General: NAD, resting comfortably in bed  C/V: NSR  Pulm: Nonlabored breathing, no respiratory distress  Abd: soft, nondistended, midline laparotomy w/ vac in place w/ good suction, brown enteric output in cannister. Ostomy ppp w/ gas and brown output in pouch. Mucous fistula appreciated. R JOYCE w/ scant serous outout, L JOYCE to wall suction w/ bilious output.  Extrem: WWP, no edema, SCDs in place      LABS:                        9.4    12.11 )-----------( 348      ( 18 Jul 2023 05:30 )             30.9     07-18    132<L>  |  97  |  24<H>  ----------------------------<  119<H>  4.5   |  22  |  0.99    Ca    7.5<L>      18 Jul 2023 05:30  Phos  3.2     07-18  Mg     1.9     07-18    TPro  x   /  Alb  2.2<L>  /  TBili  x   /  DBili  x   /  AST  x   /  ALT  x   /  AlkPhos  x   07-17      Urinalysis Basic - ( 18 Jul 2023 05:30 )    Color: x / Appearance: x / SG: x / pH: x  Gluc: 119 mg/dL / Ketone: x  / Bili: x / Urobili: x   Blood: x / Protein: x / Nitrite: x   Leuk Esterase: x / RBC: x / WBC x   Sq Epi: x / Non Sq Epi: x / Bacteria: x        RADIOLOGY & ADDITIONAL STUDIES:

## 2023-07-18 NOTE — PROGRESS NOTE ADULT - SUBJECTIVE AND OBJECTIVE BOX
ON: Net neg 1.38 @ 1am. SBP > 170. Hydralazine prn dose given. Persistently HTN. Additional 10mg hydralazine given.       SUBJECTIVE: Pt seen and examined at bedside this am by ICU team. Patient is lying comfortably in bed with no complaints. Reports he has been tolerating clear liquid diet without nausea or emesis. States he has been ambulating with physical therapy.     MEDICATIONS  (STANDING):  albumin human 25% IVPB 50 milliLiter(s) IV Intermittent every 8 hours  aMIOdarone    Tablet 200 milliGRAM(s) Oral daily  atorvastatin 20 milliGRAM(s) Oral at bedtime  chlorhexidine 2% Cloths 1 Application(s) Topical <User Schedule>  dextrose 5%. 1000 milliLiter(s) (100 mL/Hr) IV Continuous <Continuous>  dextrose 5%. 1000 milliLiter(s) (50 mL/Hr) IV Continuous <Continuous>  dextrose 50% Injectable 25 Gram(s) IV Push once  dextrose 50% Injectable 12.5 Gram(s) IV Push once  dextrose 50% Injectable 25 Gram(s) IV Push once  glucagon  Injectable 1 milliGRAM(s) IntraMuscular once  heparin   Injectable 5000 Unit(s) SubCutaneous every 8 hours  insulin lispro (ADMELOG) corrective regimen sliding scale   SubCutaneous every 6 hours  levothyroxine 50 MICROGram(s) Oral daily  lipid, fat emulsion (Fish Oil and Plant Based) 20% Infusion 0.5 Gm/kG/Day (20.83 mL/Hr) IV Continuous <Continuous>  losartan 25 milliGRAM(s) Oral daily  metoprolol tartrate 25 milliGRAM(s) Oral every 12 hours  octreotide  Injectable 100 MICROGram(s) IV Push every 8 hours  pantoprazole  Injectable 40 milliGRAM(s) IV Push daily  Parenteral Nutrition - Adult 1 Each (83 mL/Hr) TPN Continuous <Continuous>  venlafaxine XR. 300 milliGRAM(s) Oral daily    MEDICATIONS  (PRN):  acetaminophen     Tablet .. 650 milliGRAM(s) Oral every 6 hours PRN Mild Pain (1 - 3)  benzocaine/menthol Lozenge 2 Lozenge Oral every 4 hours PRN Sore Throat  dextrose Oral Gel 15 Gram(s) Oral once PRN Blood Glucose LESS THAN 70 milliGRAM(s)/deciliter  hydrALAZINE Injectable 10 milliGRAM(s) IV Push every 6 hours PRN SBP > 160  melatonin 5 milliGRAM(s) Oral at bedtime PRN Sleep    Drips: N/A    ICU Vital Signs Last 24 Hrs  T(C): 36.3 (18 Jul 2023 05:46), Max: 37.3 (17 Jul 2023 18:05)  T(F): 97.4 (18 Jul 2023 05:46), Max: 99.2 (17 Jul 2023 18:05)  HR: 76 (18 Jul 2023 07:00) (59 - 79)  BP: 157/65 (18 Jul 2023 07:00) (105/61 - 181/74)  BP(mean): 94 (18 Jul 2023 07:00) (75 - 118)  RR: 31 (18 Jul 2023 07:00) (22 - 36)  SpO2: 90% (18 Jul 2023 07:00) (90% - 99%)    O2 Parameters below as of 18 Jul 2023 07:00  Patient On (Oxygen Delivery Method): room air            Physical Exam:  General: NAD  HEENT: NC/AT, EOMI, PERRLA, normal hearing, no oral lesions, neck supple w/o LAD  Pulmonary: Nonlabored breathing, no respiratory distress, CTA-B  Cardiovascular: NSR, no murmurs  Abdominal: soft, NT/ND, +BS, no organomegaly, Ostomy noted in RUQ to be patent with dark brown output. JOYCE x1 to wall suction with bilious output. Midline wound vac in place   Extremities: WWP, 5/5 strength x4, improved LE edema - now trace. Minimal UE edema  Neuro: A/O x3, CNs II-XII grossly intact, normal motor/sensation, no focal deficits  Pulses: palpable distal pulses    Lines/tubes/drains: Midline wound vac, L JOYCE      I&O's Summary    17 Jul 2023 07:01  -  18 Jul 2023 07:00  --------------------------------------------------------  IN: 2362.4 mL / OUT: 3819 mL / NET: -1456.6 mL    18 Jul 2023 07:01  -  18 Jul 2023 07:57  --------------------------------------------------------  IN: 83 mL / OUT: 0 mL / NET: 83 mL        LABS:                        9.4    12.11 )-----------( 348      ( 18 Jul 2023 05:30 )             30.9     07-18    132<L>  |  97  |  24<H>  ----------------------------<  119<H>  4.5   |  22  |  0.99    Ca    7.5<L>      18 Jul 2023 05:30  Phos  3.2     07-18  Mg     1.9     07-18    TPro  x   /  Alb  2.2<L>  /  TBili  x   /  DBili  x   /  AST  x   /  ALT  x   /  AlkPhos  x   07-17      Urinalysis Basic - ( 18 Jul 2023 05:30 )    Color: x / Appearance: x / SG: x / pH: x  Gluc: 119 mg/dL / Ketone: x  / Bili: x / Urobili: x   Blood: x / Protein: x / Nitrite: x   Leuk Esterase: x / RBC: x / WBC x   Sq Epi: x / Non Sq Epi: x / Bacteria: x      CAPILLARY BLOOD GLUCOSE      POCT Blood Glucose.: 114 mg/dL (18 Jul 2023 05:47)  POCT Blood Glucose.: 115 mg/dL (17 Jul 2023 23:29)  POCT Blood Glucose.: 98 mg/dL (17 Jul 2023 17:23)  POCT Blood Glucose.: 106 mg/dL (17 Jul 2023 10:41)    LIVER FUNCTIONS - ( 17 Jul 2023 05:30 )  Alb: 2.2 g/dL / Pro: x     / ALK PHOS: x     / ALT: x     / AST: x     / GGT: x             Cultures:    RADIOLOGY & ADDITIONAL STUDIES:

## 2023-07-18 NOTE — PROGRESS NOTE ADULT - ATTENDING COMMENTS
AF, Crohn's s/p SBR complicated by anastomotic leak with ileocolic resection, ileostomy, blowhole colosomy  physical as above  some tachypnea today with residual basilar B lines  continue to diurese though other edema is minimal  renal function is stable   continue albumin repletion  TPN  continue amiodarone  add amlodipine to losartan, metoprolol with PRN hydralazine  decision making of high complexity

## 2023-07-18 NOTE — PROGRESS NOTE ADULT - ASSESSMENT
78M w/ Crohn's, AFib/Flutter s/p DCCVs on amiodarone, remote ileocectomy and open appy here for SBO vs Crohns flare, s/p NGT decompression and now s/p lap TRE converted to open TRE, SBR x 3, left in discontinuity with abthera vac on 6/27, RTOR for ileocolic resection, small bowel anastomosis, and abdominal wall closure on 6/28, and s/p IR aspiration of perihepatic fluid on 7/3, stepped down to telemetery on 7/4. wound dehisence on 7/5, RTOR exlap, washout, ileocolic resection with end ileostomy, blow hole colostomy, red rubber from ileostomy to small bowel anastomosis; vicryl bridging mesh on 7/5. Transferred to SICU post op for HD monitoring. Extubated 7/6.    Neuro: Tylenol PRN for pain. Hx anxiety: Continue venlafaxine  CV: Maitain MAP > 65. Hx of Afib/flutter: s/p DCCV, on PO amiodarone, metoprolol. Hx of HTN- continue home losartan and continue hydralazine. Starting amlodipine 5 daily. IV PRN for SBP < 160. Hx of HLD: continue home rosuvastatin; TTE (04/22) - PASP 50mmHg, EF 69%. Appears euvolemic with slight pulmonary edema - on 25% albumin 50q8 and diuresis with 40 lasix today. Pending repeat echo today.  Pulm: Extubated 7/6 - now on RA saturating well. s/p Chest tube by CT surg (6/27--7/3)   GI: CLD. TPN/lipids, wound dehisense -- RTOR on 7/5 for exlap, washout, ileocolic resection with end ileostomy, blow hole colostomy, red rubber from ileostomy to small bowel anastomosis; vicryl bridging mesh - vac placed 7/16, Continue Octreotide. Start cholestyramine.  : Voids.   ID: Perforated viscus, IR Cx 7/3: C. tertium, Lactobacillis. OR Cx 7/5 - rare yeast - Imipenem (6/30-7/12), Fluconazole (6/30 -7/12) OR Cx 6/28: Vanc-resistant/amp-sens (but allergic) enterococcus s/p CTX (6/26-6/30), Flagyl (6/26-6/30), Dapto (6/30-7/5). WBC downtrending today. Monitor BCx - NGTD  Endo: ISS; Hx of hypothyroid: cont synthroid IV. Gout: holding home allopurionol  PPx: SCD, HSQ. LE duplex 7/13 neg.  Lines: PIV x 2, RUE PICC (6/30--), [place] (7/8-7/10) // DC: R Chest tube for iatrogenic PTX (6/27--7/3), R TLC (06/26-30). New R TLC (7/5 -7/12)  Wounds/Drains: midline incision; wound vac changes q48h; R JOYCE below ileostomy (D/C 7/17), L JOYCE at small bowel enterotomy repair.   PT/OT: Ordered 7/6 OOB ambulating in [place]   Dispo: SICU

## 2023-07-18 NOTE — CHART NOTE - NSCHARTNOTEFT_GEN_A_CORE
Admitting Diagnosis:   Patient is a 78y old  Male who presents with a chief complaint of sbo (17 Jul 2023 11:00)      PAST MEDICAL & SURGICAL HISTORY:  Essential hypertension  Hypertension      Atrial fibrillation  s/p cardioversion 2010 and 2014  Pt. reports 4 DCCV  Now on Amiodarone 200mg PO bid and Eliquis 5mg PO bid  Last D[facility] yrs ago [facility]      Crohn's disease  s/p partial resection of ileum      Hyperlipidemia      Hypothyroidism      History of depression  On Venlafaxine ER 150mg PO bid      H/O knee surgery      History of cataract surgery          Current Nutrition Order:  TPN via central venous catheter: 455g Dex, 123g AA, 50g SMOF lipids. Provides 2539kcal, 123g protein, 1.42g/kg IBW protein, GIR 3.10mg/kg/min  2L bag running at 83ml/hr    PO: CLD      PO Intake: Good (%) [   ]  Fair (50-75%) [X   ] Poor (<25%) [   ]    GI Issues: No recorded N/V  Ileostomy w/stool and gas    Pain: Nonverbal indicators of pain absent     Skin Integrity: Rudy 13, generalized trace edema  L. buttock stage II pressure injury, sacrum stage I pressure injury  L. JOYCE  Abdominal wound vac     Labs:   07-18    132<L>  |  97  |  24<H>  ----------------------------<  119<H>  4.5   |  22  |  0.99    Ca    7.5<L>      18 Jul 2023 05:30  Phos  3.2     07-18  Mg     1.9     07-18    TPro  x   /  Alb  2.2<L>  /  TBili  x   /  DBili  x   /  AST  x   /  ALT  x   /  AlkPhos  x   07-17    CAPILLARY BLOOD GLUCOSE      POCT Blood Glucose.: 142 mg/dL (18 Jul 2023 11:58)  POCT Blood Glucose.: 114 mg/dL (18 Jul 2023 05:47)  POCT Blood Glucose.: 115 mg/dL (17 Jul 2023 23:29)  POCT Blood Glucose.: 98 mg/dL (17 Jul 2023 17:23)      Medications:  MEDICATIONS  (STANDING):  albumin human 25% IVPB 50 milliLiter(s) IV Intermittent every 8 hours  aMIOdarone    Tablet 200 milliGRAM(s) Oral daily  amLODIPine   Tablet 5 milliGRAM(s) Oral daily  atorvastatin 20 milliGRAM(s) Oral at bedtime  chlorhexidine 2% Cloths 1 Application(s) Topical <User Schedule>  cholestyramine Powder (Sugar-Free) 4 Gram(s) Oral two times a day  dextrose 5%. 1000 milliLiter(s) (100 mL/Hr) IV Continuous <Continuous>  dextrose 5%. 1000 milliLiter(s) (50 mL/Hr) IV Continuous <Continuous>  dextrose 50% Injectable 25 Gram(s) IV Push once  dextrose 50% Injectable 25 Gram(s) IV Push once  dextrose 50% Injectable 12.5 Gram(s) IV Push once  glucagon  Injectable 1 milliGRAM(s) IntraMuscular once  heparin   Injectable 5000 Unit(s) SubCutaneous every 8 hours  insulin lispro (ADMELOG) corrective regimen sliding scale   SubCutaneous every 6 hours  levothyroxine 50 MICROGram(s) Oral daily  lipid, fat emulsion (Fish Oil and Plant Based) 20% Infusion 0.5 Gm/kG/Day (20.83 mL/Hr) IV Continuous <Continuous>  losartan 25 milliGRAM(s) Oral daily  metoprolol tartrate 25 milliGRAM(s) Oral every 12 hours  octreotide  Injectable 100 MICROGram(s) IV Push every 8 hours  pantoprazole  Injectable 40 milliGRAM(s) IV Push daily  Parenteral Nutrition - Adult 1 Each (83 mL/Hr) TPN Continuous <Continuous>  Parenteral Nutrition - Adult 1 Each (83 mL/Hr) TPN Continuous <Continuous>  venlafaxine XR. 300 milliGRAM(s) Oral daily    MEDICATIONS  (PRN):  acetaminophen     Tablet .. 650 milliGRAM(s) Oral every 6 hours PRN Mild Pain (1 - 3)  benzocaine/menthol Lozenge 2 Lozenge Oral every 4 hours PRN Sore Throat  dextrose Oral Gel 15 Gram(s) Oral once PRN Blood Glucose LESS THAN 70 milliGRAM(s)/deciliter  hydrALAZINE Injectable 10 milliGRAM(s) IV Push every 6 hours PRN SBP > 160  melatonin 5 milliGRAM(s) Oral at bedtime PRN Sleep      Weight: 101kg [5 July 2023]  Daily   99.9kg [9 July 2023]  Daily 102.1kg [10 July 2023]    Weight Change: Weight gain ? 2/2 fluid shifts, edema. Recommend daily weights for trending.     IBW: 86.4kg/190lbs  %IBW: 118.2%    Estimated energy needs:   Ideal body weight (86.4kg) used for calculations as pt >100% of IBW, BMI <30 per [facility] Standards of Care. Needs estimated for age and adjusted for current clinical status, increased needs for post-op & open abd wound healing    Calories: 7589-8015 kcals based on 30-35 kcals/kg  Protein: 129.6-172.8g based on 1.5-2g protein/kg   *Fluid needs per team    Subjective:   78M w/ Crohn's, AFib/Flutter s/p DCCVs on amiodarone, remote ileocectomy and open appy here for SBO vs Crohn’s flare, s/p NGT decompression and now s/p lap TRE converted to open TRE, SBR x 3, left in discontinuity with abthera vac on 6/27, RTOR for ileocolic resection, small bowel anastomosis, and abdominal wall closure on 6/28, and s/p IR aspiration of perihepatic fluid on 7/3, stepped down to telemetery on 7/4. wound dehiscence on 7/5, RTOR 7/5 for exlap, washout. Extubated 7/6. Continues on TPN and started on CLD on 7/12. Seen by SLP on 7/14 and cleared for soft & bite sized diet as appropriate.     Patient seen in room, asleep and not arousable to name, left to rest. TPN running at 83ml/hr (2L total daily). Per MD notes, pt tolerating CLD w/o N/V. Ileostomy w/stool and gas, but mL not accurately recorded in flowsheet so unable to fully assess I&Os. Given lasix 40 today. L. JOYCE and abdominal wound vac remain in place. Started on octreotide and cholestyramine. Will continue to encourage PO as tolerated. Pertinent labs: WBC 12.11 (H), Na 132 (L),  (7/17). Will continue to follow per RD protocol.        Previous Nutrition Diagnosis: Increased Nutrient Needs R/T physiological demands for nutrient AEB post-op wound healing    Active [  X ]  Resolved [   ]    If resolved, new PES:     Goal:  Pt will meet at least 75% of protein & energy needs via most appropriate route(s) for nutrition     Recommendations:  1. c/w TPN- consider change to 146g AA, 427g dextrose  - c/w 250ml SMOFlipids daily  - to provide 2535.8kcals, 29.3 kcals/kg, 1.68g protein/kg, GIR 2.90  2. Monitor GI tract viability  - advance diet as tolerated --> low fat, low fiber  - initiate TPN taper when consistent PO intake meeting >60% estimated needs  3. Weekly lipid panel  - hold if TG >400  4. Vitamins/minerals per team in TPN --> change to PO once on solely PO diet  5. Daily BMP/Mg/Phos, POC BG Q4-6hrs  6. Pain regimen per team     Education: deferred     Risk Level: High [ X  ] Moderate [   ] Low [   ].

## 2023-07-18 NOTE — PROGRESS NOTE ADULT - SUBJECTIVE AND OBJECTIVE BOX
Labs stable  Tolerating clear liquids  Slight increase in output from left Luke-Lainez  AVSS  Abdomen: Soft    Increased dose of Sandostatin  Change of VAC  PT

## 2023-07-18 NOTE — PROGRESS NOTE ADULT - ASSESSMENT
78M POD21 laparoscopic lysis of adhesions, converted to open lysis of adhesions, SBR x 3, temporary abdominal closure, POD20 ex lap, removal of abthera, ileocolic resection, small bowel anastomosis c/b anastomotic leak resulting in wound dehiscence now POD13 RTOR exlap, washout, ileocolic resection with end ileostomy, blow hole colostomy, fistula, red rubber from ileostomy to small bowel anastomosis; vicryl bridging mesh; R JOYCE below ileostomy, L JOYCE at small bowel enterotomy repair.\    Vac change today  Appreciate excellent care

## 2023-07-18 NOTE — PROGRESS NOTE ADULT - ASSESSMENT
78M S/P laparoscopic lysis of adhesions, converted to open lysis of adhesions, SBR x 3, temporary abdominal closure, S/P ex lap, removal of abthera, ileocolic resection, small bowel anastomosis c/b anastomotic leak resulting in wound dehiscence now POD8 RTOR exlap, washout, ileocolic resection with end ileostomy, blow hole colostomy, fistula, red rubber from ileostomy to small bowel anastomosis (removed); vicryl bridging mesh; R JOYCE below ileostomy, L JOYCE at small bowel enterotomy repair. Midline wound with vac in place, enteric content and bile noted on cannister. Tolerating clears, wound vac changed today.       - Wound vac change every 2 days, next 7/20  - Plan discussed with Dr Peterson

## 2023-07-18 NOTE — PROGRESS NOTE ADULT - SUBJECTIVE AND OBJECTIVE BOX
SUBJECTIVE: Patient doing well seen during morning rounds. Wound vac holding suction. Bilious and stool noted on vac cannister. Tolerating clears.       MEDICATIONS  (STANDING):  albumin human 25% IVPB 50 milliLiter(s) IV Intermittent every 8 hours  aMIOdarone    Tablet 200 milliGRAM(s) Oral daily  amLODIPine   Tablet 5 milliGRAM(s) Oral daily  atorvastatin 20 milliGRAM(s) Oral at bedtime  chlorhexidine 2% Cloths 1 Application(s) Topical <User Schedule>  cholestyramine Powder (Sugar-Free) 4 Gram(s) Oral two times a day  dextrose 5%. 1000 milliLiter(s) (50 mL/Hr) IV Continuous <Continuous>  dextrose 5%. 1000 milliLiter(s) (100 mL/Hr) IV Continuous <Continuous>  dextrose 50% Injectable 25 Gram(s) IV Push once  dextrose 50% Injectable 12.5 Gram(s) IV Push once  dextrose 50% Injectable 25 Gram(s) IV Push once  glucagon  Injectable 1 milliGRAM(s) IntraMuscular once  heparin   Injectable 5000 Unit(s) SubCutaneous every 8 hours  insulin lispro (ADMELOG) corrective regimen sliding scale   SubCutaneous every 6 hours  levothyroxine 50 MICROGram(s) Oral daily  lipid, fat emulsion (Fish Oil and Plant Based) 20% Infusion 0.5 Gm/kG/Day (20.83 mL/Hr) IV Continuous <Continuous>  losartan 25 milliGRAM(s) Oral daily  metoprolol tartrate 25 milliGRAM(s) Oral every 12 hours  octreotide  Injectable 100 MICROGram(s) IV Push every 8 hours  pantoprazole  Injectable 40 milliGRAM(s) IV Push daily  Parenteral Nutrition - Adult 1 Each (83 mL/Hr) TPN Continuous <Continuous>  Parenteral Nutrition - Adult 1 Each (83 mL/Hr) TPN Continuous <Continuous>  venlafaxine XR. 300 milliGRAM(s) Oral daily    MEDICATIONS  (PRN):  acetaminophen     Tablet .. 650 milliGRAM(s) Oral every 6 hours PRN Mild Pain (1 - 3)  benzocaine/menthol Lozenge 2 Lozenge Oral every 4 hours PRN Sore Throat  dextrose Oral Gel 15 Gram(s) Oral once PRN Blood Glucose LESS THAN 70 milliGRAM(s)/deciliter  hydrALAZINE Injectable 10 milliGRAM(s) IV Push every 6 hours PRN SBP > 160  melatonin 5 milliGRAM(s) Oral at bedtime PRN Sleep      Drips:     ICU Vital Signs Last 24 Hrs  T(C): 36.7 (18 Jul 2023 17:32), Max: 37.4 (18 Jul 2023 09:00)  T(F): 98 (18 Jul 2023 17:32), Max: 99.3 (18 Jul 2023 09:00)  HR: 62 (18 Jul 2023 16:00) (61 - 85)  BP: 126/59 (18 Jul 2023 16:00) (105/61 - 181/74)  BP(mean): 85 (18 Jul 2023 16:00) (75 - 118)  ABP: --  ABP(mean): --  RR: 26 (18 Jul 2023 16:00) (23 - 40)  SpO2: 96% (18 Jul 2023 16:00) (81% - 99%)    O2 Parameters below as of 18 Jul 2023 16:00  Patient On (Oxygen Delivery Method): room air        Physical exam  General: NAD, resting comfortably in bed  C/V: NSR  Pulm: Nonlabored breathing, no respiratory distress  Abd: soft, nondistended, midline laparotomy w/ vac in place w/ good suction, brown enteric output in cannister. Ostomy ppp w/ gas and brown output in pouch. Mucous fistula appreciated. R JOYCE w/ scant serous outout, L JOYCE to wall suction w/ bilious output.  Extrem: WWP, no edema, SCDs in place      I&O's Summary    17 Jul 2023 07:01  -  18 Jul 2023 07:00  --------------------------------------------------------  IN: 2512.4 mL / OUT: 3819 mL / NET: -1306.6 mL    18 Jul 2023 07:01  -  18 Jul 2023 17:33  --------------------------------------------------------  IN: 980 mL / OUT: 1020 mL / NET: -40 mL        LABS:                        9.4    12.11 )-----------( 348      ( 18 Jul 2023 05:30 )             30.9     07-18    132<L>  |  97  |  24<H>  ----------------------------<  119<H>  4.5   |  22  |  0.99    Ca    7.5<L>      18 Jul 2023 05:30  Phos  3.2     07-18  Mg     1.9     07-18    TPro  x   /  Alb  2.2<L>  /  TBili  x   /  DBili  x   /  AST  x   /  ALT  x   /  AlkPhos  x   07-17      Urinalysis Basic - ( 18 Jul 2023 05:30 )    Color: x / Appearance: x / SG: x / pH: x  Gluc: 119 mg/dL / Ketone: x  / Bili: x / Urobili: x   Blood: x / Protein: x / Nitrite: x   Leuk Esterase: x / RBC: x / WBC x   Sq Epi: x / Non Sq Epi: x / Bacteria: x      CAPILLARY BLOOD GLUCOSE      POCT Blood Glucose.: 142 mg/dL (18 Jul 2023 11:58)  POCT Blood Glucose.: 114 mg/dL (18 Jul 2023 05:47)  POCT Blood Glucose.: 115 mg/dL (17 Jul 2023 23:29)    LIVER FUNCTIONS - ( 17 Jul 2023 05:30 )  Alb: 2.2 g/dL / Pro: x     / ALK PHOS: x     / ALT: x     / AST: x     / GGT: x

## 2023-07-18 NOTE — PROGRESS NOTE ADULT - SUBJECTIVE AND OBJECTIVE BOX
Pt seen and examined   on clears  no complaints    REVIEW OF SYSTEMS:  Constitutional: No fever,  Cardiovascular: No chest pain, palpitations, dizziness   Gastrointestinal: No abdominal or epigastric pain. No nausea, vomiting   Skin: No itching, burning, rashes or lesions       MEDICATIONS:  MEDICATIONS  (STANDING):  albumin human 25% IVPB 50 milliLiter(s) IV Intermittent every 8 hours  aMIOdarone    Tablet 200 milliGRAM(s) Oral daily  amLODIPine   Tablet 5 milliGRAM(s) Oral daily  atorvastatin 20 milliGRAM(s) Oral at bedtime  chlorhexidine 2% Cloths 1 Application(s) Topical <User Schedule>  cholestyramine Powder (Sugar-Free) 4 Gram(s) Oral two times a day  dextrose 5%. 1000 milliLiter(s) (50 mL/Hr) IV Continuous <Continuous>  dextrose 5%. 1000 milliLiter(s) (100 mL/Hr) IV Continuous <Continuous>  dextrose 50% Injectable 25 Gram(s) IV Push once  dextrose 50% Injectable 12.5 Gram(s) IV Push once  dextrose 50% Injectable 25 Gram(s) IV Push once  glucagon  Injectable 1 milliGRAM(s) IntraMuscular once  heparin   Injectable 5000 Unit(s) SubCutaneous every 8 hours  insulin lispro (ADMELOG) corrective regimen sliding scale   SubCutaneous every 6 hours  levothyroxine 50 MICROGram(s) Oral daily  lipid, fat emulsion (Fish Oil and Plant Based) 20% Infusion 0.5 Gm/kG/Day (20.83 mL/Hr) IV Continuous <Continuous>  losartan 25 milliGRAM(s) Oral daily  metoprolol tartrate 25 milliGRAM(s) Oral every 12 hours  octreotide  Injectable 100 MICROGram(s) IV Push every 8 hours  pantoprazole  Injectable 40 milliGRAM(s) IV Push daily  Parenteral Nutrition - Adult 1 Each (83 mL/Hr) TPN Continuous <Continuous>  Parenteral Nutrition - Adult 1 Each (83 mL/Hr) TPN Continuous <Continuous>  venlafaxine XR. 300 milliGRAM(s) Oral daily    MEDICATIONS  (PRN):  acetaminophen     Tablet .. 650 milliGRAM(s) Oral every 6 hours PRN Mild Pain (1 - 3)  benzocaine/menthol Lozenge 2 Lozenge Oral every 4 hours PRN Sore Throat  dextrose Oral Gel 15 Gram(s) Oral once PRN Blood Glucose LESS THAN 70 milliGRAM(s)/deciliter  hydrALAZINE Injectable 10 milliGRAM(s) IV Push every 6 hours PRN SBP > 160  melatonin 5 milliGRAM(s) Oral at bedtime PRN Sleep      Allergies    penicillin (Angioedema)    Intolerances        Vital Signs Last 24 Hrs  T(C): 37.4 (18 Jul 2023 09:00), Max: 37.4 (18 Jul 2023 09:00)  T(F): 99.3 (18 Jul 2023 09:00), Max: 99.3 (18 Jul 2023 09:00)  HR: 64 (18 Jul 2023 12:00) (59 - 85)  BP: 116/55 (18 Jul 2023 12:00) (105/61 - 181/74)  BP(mean): 79 (18 Jul 2023 12:00) (75 - 118)  RR: 32 (18 Jul 2023 12:00) (23 - 40)  SpO2: 96% (18 Jul 2023 12:00) (81% - 99%)    Parameters below as of 18 Jul 2023 12:00  Patient On (Oxygen Delivery Method): room air        07-17 @ 07:01 - 07-18 @ 07:00  --------------------------------------------------------  IN: 2512.4 mL / OUT: 3819 mL / NET: -1306.6 mL    07-18 @ 07:01 - 07-18 @ 12:40  --------------------------------------------------------  IN: 515 mL / OUT: 900 mL / NET: -385 mL        PHYSICAL EXAM:    General: ; in no acute distress  HEENT: MMM, conjunctiva and sclera clear  Gastrointestinal: Soft non-tender non-distended; Normal bowel sounds; dressings on, ileostomy scant output      LABS:      CBC Full  -  ( 18 Jul 2023 05:30 )  WBC Count : 12.11 K/uL  RBC Count : 3.13 M/uL  Hemoglobin : 9.4 g/dL  Hematocrit : 30.9 %  Platelet Count - Automated : 348 K/uL  Mean Cell Volume : 98.7 fl  Mean Cell Hemoglobin : 30.0 pg  Mean Cell Hemoglobin Concentration : 30.4 gm/dL  Auto Neutrophil # : 10.92 K/uL  Auto Lymphocyte # : 0.44 K/uL  Auto Monocyte # : 0.64 K/uL  Auto Eosinophil # : 0.00 K/uL  Auto Basophil # : 0.00 K/uL  Auto Neutrophil % : 84.8 %  Auto Lymphocyte % : 3.6 %  Auto Monocyte % : 5.3 %  Auto Eosinophil % : 0.0 %  Auto Basophil % : 0.0 %    07-18    132<L>  |  97  |  24<H>  ----------------------------<  119<H>  4.5   |  22  |  0.99    Ca    7.5<L>      18 Jul 2023 05:30  Phos  3.2     07-18  Mg     1.9     07-18    TPro  x   /  Alb  2.2<L>  /  TBili  x   /  DBili  x   /  AST  x   /  ALT  x   /  AlkPhos  x   07-17          Urinalysis Basic - ( 18 Jul 2023 05:30 )    Color: x / Appearance: x / SG: x / pH: x  Gluc: 119 mg/dL / Ketone: x  / Bili: x / Urobili: x   Blood: x / Protein: x / Nitrite: x   Leuk Esterase: x / RBC: x / WBC x   Sq Epi: x / Non Sq Epi: x / Bacteria: x                RADIOLOGY & ADDITIONAL STUDIES (The following images were personally reviewed):

## 2023-07-19 NOTE — PROGRESS NOTE ADULT - SUBJECTIVE AND OBJECTIVE BOX
Pt seen and examined   no complaints    REVIEW OF SYSTEMS:  Constitutional: No fever,   Cardiovascular: No chest pain, palpitations, dizziness  Gastrointestinal: No abdominal or epigastric pain. No nausea, vomiting   Skin: No itching, burning, rashes or lesions       MEDICATIONS:  MEDICATIONS  (STANDING):  albumin human 25% IVPB 50 milliLiter(s) IV Intermittent every 8 hours  aMIOdarone    Tablet 200 milliGRAM(s) Oral daily  amLODIPine   Tablet 5 milliGRAM(s) Oral daily  atorvastatin 20 milliGRAM(s) Oral at bedtime  chlorhexidine 2% Cloths 1 Application(s) Topical <User Schedule>  cholestyramine Powder (Sugar-Free) 4 Gram(s) Oral two times a day  dextrose 5%. 1000 milliLiter(s) (100 mL/Hr) IV Continuous <Continuous>  dextrose 5%. 1000 milliLiter(s) (50 mL/Hr) IV Continuous <Continuous>  dextrose 50% Injectable 25 Gram(s) IV Push once  dextrose 50% Injectable 12.5 Gram(s) IV Push once  dextrose 50% Injectable 25 Gram(s) IV Push once  glucagon  Injectable 1 milliGRAM(s) IntraMuscular once  heparin   Injectable 5000 Unit(s) SubCutaneous every 8 hours  insulin lispro (ADMELOG) corrective regimen sliding scale   SubCutaneous every 6 hours  levothyroxine 50 MICROGram(s) Oral daily  lipid, fat emulsion (Fish Oil and Plant Based) 20% Infusion 0.5 Gm/kG/Day (20.83 mL/Hr) IV Continuous <Continuous>  losartan 25 milliGRAM(s) Oral daily  metoprolol tartrate 25 milliGRAM(s) Oral every 12 hours  octreotide  Injectable 100 MICROGram(s) IV Push every 8 hours  pantoprazole  Injectable 40 milliGRAM(s) IV Push daily  Parenteral Nutrition - Adult 1 Each (83 mL/Hr) TPN Continuous <Continuous>  venlafaxine XR. 300 milliGRAM(s) Oral daily    MEDICATIONS  (PRN):  acetaminophen     Tablet .. 650 milliGRAM(s) Oral every 6 hours PRN Mild Pain (1 - 3)  benzocaine/menthol Lozenge 2 Lozenge Oral every 4 hours PRN Sore Throat  dextrose Oral Gel 15 Gram(s) Oral once PRN Blood Glucose LESS THAN 70 milliGRAM(s)/deciliter  hydrALAZINE Injectable 10 milliGRAM(s) IV Push every 6 hours PRN SBP > 160  melatonin 5 milliGRAM(s) Oral at bedtime PRN Sleep      Allergies    penicillin (Angioedema)    Intolerances        Vital Signs Last 24 Hrs  T(C): 37.4 (19 Jul 2023 09:00), Max: 37.4 (19 Jul 2023 09:00)  T(F): 99.4 (19 Jul 2023 09:00), Max: 99.4 (19 Jul 2023 09:00)  HR: 70 (19 Jul 2023 10:00) (59 - 79)  BP: 136/65 (19 Jul 2023 10:00) (113/87 - 167/74)  BP(mean): 93 (19 Jul 2023 10:00) (79 - 117)  RR: 29 (19 Jul 2023 10:00) (18 - 36)  SpO2: 94% (19 Jul 2023 10:00) (92% - 98%)    Parameters below as of 19 Jul 2023 10:00  Patient On (Oxygen Delivery Method): room air        07-18 @ 07:01 - 07-19 @ 07:00  --------------------------------------------------------  IN: 2891.6 mL / OUT: 2585 mL / NET: 306.6 mL    07-19 @ 07:01  -  07-19 @ 10:37  --------------------------------------------------------  IN: 369 mL / OUT: 450 mL / NET: -81 mL        PHYSICAL EXAM:    General:  in no acute distress  HEENT: MMM, conjunctiva and sclera clear  Gastrointestinal: Soft  non-distended; Normal bowel sounds; ileostomy with stools  Skin: Warm and dry. No obvious rash    LABS:      CBC Full  -  ( 19 Jul 2023 05:25 )  WBC Count : 12.56 K/uL  RBC Count : 2.79 M/uL  Hemoglobin : 8.4 g/dL  Hematocrit : 25.8 %  Platelet Count - Automated : 334 K/uL  Mean Cell Volume : 92.5 fl  Mean Cell Hemoglobin : 30.1 pg  Mean Cell Hemoglobin Concentration : 32.6 gm/dL  Auto Neutrophil # : 11.20 K/uL  Auto Lymphocyte # : 0.11 K/uL  Auto Monocyte # : 1.13 K/uL  Auto Eosinophil # : 0.00 K/uL  Auto Basophil # : 0.00 K/uL  Auto Neutrophil % : 88.3 %  Auto Lymphocyte % : 0.9 %  Auto Monocyte % : 9.0 %  Auto Eosinophil % : 0.0 %  Auto Basophil % : 0.0 %    07-19    132<L>  |  98  |  26<H>  ----------------------------<  121<H>  3.9   |  24  |  1.01    Ca    7.6<L>      19 Jul 2023 05:25  Phos  3.2     07-19  Mg     2.1     07-19            Urinalysis Basic - ( 19 Jul 2023 05:25 )    Color: x / Appearance: x / SG: x / pH: x  Gluc: 121 mg/dL / Ketone: x  / Bili: x / Urobili: x   Blood: x / Protein: x / Nitrite: x   Leuk Esterase: x / RBC: x / WBC x   Sq Epi: x / Non Sq Epi: x / Bacteria: x                RADIOLOGY & ADDITIONAL STUDIES (The following images were personally reviewed):

## 2023-07-19 NOTE — PROGRESS NOTE ADULT - ASSESSMENT
78M S/P laparoscopic lysis of adhesions, converted to open lysis of adhesions, SBR x 3, temporary abdominal closure, S/P ex lap, removal of abthera, ileocolic resection, small bowel anastomosis c/b anastomotic leak resulting in wound dehiscence now POD8 RTOR exlap, washout, ileocolic resection with end ileostomy, blow hole colostomy, fistula, red rubber from ileostomy to small bowel anastomosis (removed); vicryl bridging mesh; R JOYCE below ileostomy, L JOYCE at small bowel enterotomy repair. Midline wound with vac in place, enteric content, bile and some blood noted on cannister. Tolerating clears.     - Flush left drain   - Continue clears   - Wound vac change every 2 days, next 7/20  - Plan discussed with Dr Peterson

## 2023-07-19 NOTE — PROGRESS NOTE ADULT - SUBJECTIVE AND OBJECTIVE BOX
ON: Hydralazine given for SBP > 140 x2. Norvasc given early.     SUBJECTIVE: Patient seen and examined at bedside this am by ICU team. Patient is lying comfortably in bed with no complaints. Denies abdominal pain. Denies issues tolerating clear liquid diet. States he has been ambulating with PT.    MEDICATIONS  (STANDING):  albumin human 25% IVPB 50 milliLiter(s) IV Intermittent every 8 hours  aMIOdarone    Tablet 200 milliGRAM(s) Oral daily  amLODIPine   Tablet 5 milliGRAM(s) Oral daily  atorvastatin 20 milliGRAM(s) Oral at bedtime  chlorhexidine 2% Cloths 1 Application(s) Topical <User Schedule>  cholestyramine Powder (Sugar-Free) 4 Gram(s) Oral two times a day  dextrose 5%. 1000 milliLiter(s) (100 mL/Hr) IV Continuous <Continuous>  dextrose 5%. 1000 milliLiter(s) (50 mL/Hr) IV Continuous <Continuous>  dextrose 50% Injectable 25 Gram(s) IV Push once  dextrose 50% Injectable 12.5 Gram(s) IV Push once  dextrose 50% Injectable 25 Gram(s) IV Push once  glucagon  Injectable 1 milliGRAM(s) IntraMuscular once  heparin   Injectable 5000 Unit(s) SubCutaneous every 8 hours  insulin lispro (ADMELOG) corrective regimen sliding scale   SubCutaneous every 6 hours  levothyroxine 50 MICROGram(s) Oral daily  lipid, fat emulsion (Fish Oil and Plant Based) 20% Infusion 0.5 Gm/kG/Day (20.83 mL/Hr) IV Continuous <Continuous>  losartan 25 milliGRAM(s) Oral daily  metoprolol tartrate 25 milliGRAM(s) Oral every 12 hours  octreotide  Injectable 100 MICROGram(s) IV Push every 8 hours  pantoprazole  Injectable 40 milliGRAM(s) IV Push daily  Parenteral Nutrition - Adult 1 Each (83 mL/Hr) TPN Continuous <Continuous>  venlafaxine XR. 300 milliGRAM(s) Oral daily    MEDICATIONS  (PRN):  acetaminophen     Tablet .. 650 milliGRAM(s) Oral every 6 hours PRN Mild Pain (1 - 3)  benzocaine/menthol Lozenge 2 Lozenge Oral every 4 hours PRN Sore Throat  dextrose Oral Gel 15 Gram(s) Oral once PRN Blood Glucose LESS THAN 70 milliGRAM(s)/deciliter  hydrALAZINE Injectable 10 milliGRAM(s) IV Push every 6 hours PRN SBP > 160  melatonin 5 milliGRAM(s) Oral at bedtime PRN Sleep      Drips: N/A    ICU Vital Signs Last 24 Hrs  T(C): 37.4 (19 Jul 2023 09:00), Max: 37.4 (19 Jul 2023 09:00)  T(F): 99.4 (19 Jul 2023 09:00), Max: 99.4 (19 Jul 2023 09:00)  HR: 73 (19 Jul 2023 09:00) (59 - 79)  BP: 113/87 (19 Jul 2023 09:00) (113/87 - 167/74)  BP(mean): 98 (19 Jul 2023 09:00) (79 - 117)  RR: 27 (19 Jul 2023 09:00) (18 - 36)  SpO2: 94% (19 Jul 2023 09:00) (81% - 98%)    O2 Parameters below as of 19 Jul 2023 08:00  Patient On (Oxygen Delivery Method): room air            Physical Exam:  General: NAD, resting comfortably  HEENT: NC/AT, EOMI, PERRLA, normal hearing, no oral lesions, neck supple w/o LAD  Pulmonary: Nonlabored breathing, no respiratory distress, CTA-B  Cardiovascular: NSR, no murmurs  Abdominal: soft, NT/ND, midline woundvac in place. JOYCE x1 noted to be bilious. Ostomy noted in RUQ to be patent with brown dark output  Extremities: WWP, 5/5 strength x 4, no clubbing/cyanosis/edema  Neuro: A/O x3, CNs II-XII grossly intact, normal motor/sensation, no focal deficits  Pulses: palpable distal pulses    Lines/tubes/drains: E PICC      I&O's Summary    18 Jul 2023 07:01  -  19 Jul 2023 07:00  --------------------------------------------------------  IN: 2891.6 mL / OUT: 2585 mL / NET: 306.6 mL    19 Jul 2023 07:01  -  19 Jul 2023 09:56  --------------------------------------------------------  IN: 83 mL / OUT: 0 mL / NET: 83 mL        LABS:                        8.4    12.56 )-----------( 334      ( 19 Jul 2023 05:25 )             25.8     07-19    132<L>  |  98  |  26<H>  ----------------------------<  121<H>  3.9   |  24  |  1.01    Ca    7.6<L>      19 Jul 2023 05:25  Phos  3.2     07-19  Mg     2.1     07-19        Urinalysis Basic - ( 19 Jul 2023 05:25 )    Color: x / Appearance: x / SG: x / pH: x  Gluc: 121 mg/dL / Ketone: x  / Bili: x / Urobili: x   Blood: x / Protein: x / Nitrite: x   Leuk Esterase: x / RBC: x / WBC x   Sq Epi: x / Non Sq Epi: x / Bacteria: x      CAPILLARY BLOOD GLUCOSE      POCT Blood Glucose.: 151 mg/dL (19 Jul 2023 06:55)  POCT Blood Glucose.: 118 mg/dL (18 Jul 2023 23:51)  POCT Blood Glucose.: 124 mg/dL (18 Jul 2023 18:30)  POCT Blood Glucose.: 142 mg/dL (18 Jul 2023 11:58)

## 2023-07-19 NOTE — PROGRESS NOTE ADULT - ASSESSMENT
78M POD22 laparoscopic lysis of adhesions, converted to open lysis of adhesions, SBR x 3, temporary abdominal closure, POD21 ex lap, removal of abthera, ileocolic resection, small bowel anastomosis c/b anastomotic leak resulting in wound dehiscence now POD14 RTOR exlap, washout, ileocolic resection with end ileostomy, blow hole colostomy, fistula, red rubber from ileostomy to small bowel anastomosis; vicryl bridging mesh; R JOYCE below ileostomy, L JOYCE at small bowel enterotomy repair.    Vac change planned for 7/20 @ 0830  Monitor bleeding around vac, possibly will require change earlier than planned vac tomorrow  Appreciate excellent SICU care

## 2023-07-19 NOTE — PROGRESS NOTE ADULT - SUBJECTIVE AND OBJECTIVE BOX
SUBJECTIVE: Patient doing well seen during morning rounds. Wound vac holding suction. Blood tinged bile and stool noted on vac cannister. Tolerating clears.     MEDICATIONS  (STANDING):  aMIOdarone    Tablet 200 milliGRAM(s) Oral daily  amLODIPine   Tablet 5 milliGRAM(s) Oral daily  atorvastatin 20 milliGRAM(s) Oral at bedtime  chlorhexidine 2% Cloths 1 Application(s) Topical <User Schedule>  cholestyramine Powder (Sugar-Free) 4 Gram(s) Oral two times a day  dextrose 5%. 1000 milliLiter(s) (50 mL/Hr) IV Continuous <Continuous>  dextrose 5%. 1000 milliLiter(s) (100 mL/Hr) IV Continuous <Continuous>  dextrose 50% Injectable 25 Gram(s) IV Push once  dextrose 50% Injectable 12.5 Gram(s) IV Push once  dextrose 50% Injectable 25 Gram(s) IV Push once  glucagon  Injectable 1 milliGRAM(s) IntraMuscular once  heparin   Injectable 5000 Unit(s) SubCutaneous every 8 hours  insulin lispro (ADMELOG) corrective regimen sliding scale   SubCutaneous every 6 hours  levothyroxine 50 MICROGram(s) Oral daily  lipid, fat emulsion (Fish Oil and Plant Based) 20% Infusion 0.5 Gm/kG/Day (20.83 mL/Hr) IV Continuous <Continuous>  losartan 25 milliGRAM(s) Oral daily  metoprolol tartrate 25 milliGRAM(s) Oral every 12 hours  octreotide  Injectable 100 MICROGram(s) IV Push every 8 hours  pantoprazole  Injectable 40 milliGRAM(s) IV Push daily  Parenteral Nutrition - Adult 1 Each (83 mL/Hr) TPN Continuous <Continuous>  Parenteral Nutrition - Adult 1 Each (85 mL/Hr) TPN Continuous <Continuous>  venlafaxine XR. 300 milliGRAM(s) Oral daily    MEDICATIONS  (PRN):  acetaminophen     Tablet .. 650 milliGRAM(s) Oral every 6 hours PRN Mild Pain (1 - 3)  benzocaine/menthol Lozenge 2 Lozenge Oral every 4 hours PRN Sore Throat  dextrose Oral Gel 15 Gram(s) Oral once PRN Blood Glucose LESS THAN 70 milliGRAM(s)/deciliter  hydrALAZINE Injectable 10 milliGRAM(s) IV Push every 6 hours PRN SBP > 160  melatonin 5 milliGRAM(s) Oral at bedtime PRN Sleep      Drips:     ICU Vital Signs Last 24 Hrs  T(C): 37.4 (19 Jul 2023 09:00), Max: 37.4 (19 Jul 2023 09:00)  T(F): 99.4 (19 Jul 2023 09:00), Max: 99.4 (19 Jul 2023 09:00)  HR: 65 (19 Jul 2023 13:00) (59 - 79)  BP: 108/54 (19 Jul 2023 13:00) (108/54 - 167/74)  BP(mean): 78 (19 Jul 2023 13:00) (78 - 117)  ABP: --  ABP(mean): --  RR: 33 (19 Jul 2023 13:00) (18 - 36)  SpO2: 97% (19 Jul 2023 13:00) (92% - 98%)    O2 Parameters below as of 19 Jul 2023 13:00  Patient On (Oxygen Delivery Method): room air            Physical exam  General: NAD, resting comfortably in bed  C/V: NSR  Pulm: Nonlabored breathing, no respiratory distress  Abd: soft, nondistended, midline laparotomy w/ vac in place w/ good suction, brown enteric output in cannister. Ostomy ppp w/ gas and brown output in pouch. Mucous fistula appreciated. R JOYCE w/ scant serous output, L JOYCE = w/ bilious output.  Extrem: WWP, no edema, SCDs in place        I&O's Summary    18 Jul 2023 07:01  -  19 Jul 2023 07:00  --------------------------------------------------------  IN: 2891.6 mL / OUT: 2585 mL / NET: 306.6 mL    19 Jul 2023 07:01  -  19 Jul 2023 13:26  --------------------------------------------------------  IN: 738 mL / OUT: 550 mL / NET: 188 mL        LABS:                        8.4    12.56 )-----------( 334      ( 19 Jul 2023 05:25 )             25.8     07-19    132<L>  |  98  |  26<H>  ----------------------------<  121<H>  3.9   |  24  |  1.01    Ca    7.6<L>      19 Jul 2023 05:25  Phos  3.2     07-19  Mg     2.1     07-19    TPro  x   /  Alb  2.6<L>  /  TBili  x   /  DBili  x   /  AST  x   /  ALT  x   /  AlkPhos  x   07-19      Urinalysis Basic - ( 19 Jul 2023 05:25 )    Color: x / Appearance: x / SG: x / pH: x  Gluc: 121 mg/dL / Ketone: x  / Bili: x / Urobili: x   Blood: x / Protein: x / Nitrite: x   Leuk Esterase: x / RBC: x / WBC x   Sq Epi: x / Non Sq Epi: x / Bacteria: x      CAPILLARY BLOOD GLUCOSE      POCT Blood Glucose.: 118 mg/dL (19 Jul 2023 12:33)  POCT Blood Glucose.: 151 mg/dL (19 Jul 2023 06:55)  POCT Blood Glucose.: 118 mg/dL (18 Jul 2023 23:51)  POCT Blood Glucose.: 124 mg/dL (18 Jul 2023 18:30)    LIVER FUNCTIONS - ( 19 Jul 2023 05:25 )  Alb: 2.6 g/dL / Pro: x     / ALK PHOS: x     / ALT: x     / AST: x     / GGT: x

## 2023-07-19 NOTE — PROGRESS NOTE ADULT - SUBJECTIVE AND OBJECTIVE BOX
INTERVAL HPI/OVERNIGHT EVENTS: Hydralazine given for SBP > 140 x2. Norvasc given early.     STATUS POST:    6/27: Laparoscopic lysis of adhesions, converted to open lysis of adhesions, SBR x 3, temporary abdominal closure, 5L cryst, 1L 5% alb, 3 pRBC, 1 FFP, 1 plts  6/28: ex lap, removal of abthera, ileocolic resection, small bowel anastamosis, , 1.6 crystalloid, 250 5%, 175 uop   7/3: IR Gendel drained perihepatic aspiration of serous fluid.   7/5: RTOR exlap, washout, ileocolic resection with end ileostomy, blow hole colostomy, fistula, red rubber from ileostomy to small bowel anastomosis; vicryl bridging mesh; R OJYCE below ileostomy, L JOYCE at small bowel enterotomy repair; 500 LR, 500 5% albumin, 3u PRBC, 2 FFP, 400 UOP,     SUBJECTIVE: Patient seen and examined at bedside. In good spirits, pain is well-controlled, able to rhoda PO without n/v.      aMIOdarone    Tablet 200 milliGRAM(s) Oral daily  amLODIPine   Tablet 5 milliGRAM(s) Oral daily  heparin   Injectable 5000 Unit(s) SubCutaneous every 8 hours  hydrALAZINE Injectable 10 milliGRAM(s) IV Push every 6 hours PRN  losartan 25 milliGRAM(s) Oral daily  metoprolol tartrate 25 milliGRAM(s) Oral every 12 hours      Vital Signs Last 24 Hrs  T(C): 37.4 (19 Jul 2023 09:00), Max: 37.4 (19 Jul 2023 09:00)  T(F): 99.4 (19 Jul 2023 09:00), Max: 99.4 (19 Jul 2023 09:00)  HR: 74 (19 Jul 2023 11:00) (59 - 79)  BP: 144/64 (19 Jul 2023 11:00) (113/87 - 167/74)  BP(mean): 92 (19 Jul 2023 11:00) (79 - 117)  RR: 28 (19 Jul 2023 11:00) (18 - 36)  SpO2: 94% (19 Jul 2023 11:00) (92% - 98%)    Parameters below as of 19 Jul 2023 11:00  Patient On (Oxygen Delivery Method): room air      I&O's Detail    18 Jul 2023 07:01  -  19 Jul 2023 07:00  --------------------------------------------------------  IN:    Albumin 25%  -  50 mL: 100 mL    Fat Emulsion (Fish Oil &amp; Plant Based) 20% Infusion: 249.6 mL    IV PiggyBack: 100 mL    IV PiggyBack: 50 mL    Oral Fluid: 400 mL    TPN (Total Parenteral Nutrition): 1992 mL  Total IN: 2891.6 mL    OUT:    Bulb (mL): 200 mL    Ileostomy (mL): 15 mL    VAC (Vacuum Assisted Closure) System (mL): 700 mL    Voided (mL): 1670 mL  Total OUT: 2585 mL    Total NET: 306.6 mL      19 Jul 2023 07:01  -  19 Jul 2023 11:44  --------------------------------------------------------  IN:    Oral Fluid: 120 mL    TPN (Total Parenteral Nutrition): 249 mL  Total IN: 369 mL    OUT:    Fat Emulsion (Fish Oil &amp; Plant Based) 20% Infusion: 0 mL    VAC (Vacuum Assisted Closure) System (mL): 350 mL    Voided (mL): 100 mL  Total OUT: 450 mL    Total NET: -81 mL          General: NAD, resting comfortably in bed  C/V: NSR  Pulm: Nonlabored breathing, no respiratory distress  Abd: soft, nontender, mildly distended. Vac in place w/ good suction, however oozing noted at edges w/ some in canister. JOYCE to wall suction w/ minimal bilious output.  Extrem: WWP, no edema, SCDs in place      LABS:                        8.4    12.56 )-----------( 334      ( 19 Jul 2023 05:25 )             25.8     07-19    132<L>  |  98  |  26<H>  ----------------------------<  121<H>  3.9   |  24  |  1.01    Ca    7.6<L>      19 Jul 2023 05:25  Phos  3.2     07-19  Mg     2.1     07-19    TPro  x   /  Alb  2.6<L>  /  TBili  x   /  DBili  x   /  AST  x   /  ALT  x   /  AlkPhos  x   07-19      Urinalysis Basic - ( 19 Jul 2023 05:25 )    Color: x / Appearance: x / SG: x / pH: x  Gluc: 121 mg/dL / Ketone: x  / Bili: x / Urobili: x   Blood: x / Protein: x / Nitrite: x   Leuk Esterase: x / RBC: x / WBC x   Sq Epi: x / Non Sq Epi: x / Bacteria: x        RADIOLOGY & ADDITIONAL STUDIES:

## 2023-07-19 NOTE — PROGRESS NOTE ADULT - ASSESSMENT
78M w/ Crohn's, AFib/Flutter s/p DCCVs on amiodarone, remote ileocectomy and open appy here for SBO vs Crohns flare, s/p NGT decompression and now s/p lap TRE converted to open TRE, SBR x 3, left in discontinuity with abthera vac on 6/27, RTOR for ileocolic resection, small bowel anastomosis, and abdominal wall closure on 6/28, and s/p IR aspiration of perihepatic fluid on 7/3, stepped down to telemetery on 7/4. wound dehisence on 7/5, RTOR exlap, washout, ileocolic resection with end ileostomy, blow hole colostomy, red rubber from ileostomy to small bowel anastomosis; vicryl bridging mesh on 7/5. Transferred to SICU post op for HD monitoring. Extubated 7/6.    Neuro: Tylenol PRN for pain. Hx anxiety: Continue venlafaxine  CV: MAP > 65. Hx of Afib/flutter: s/p DCCV, on PO amiodarone, metoprolol. Hx of HTN- continue home losartan and continue hydralazine. Starting amlodipine 5 daily. IV PRN for SBP < 140. Hx of HLD: continue home rosuvastatin; Repeat TTE  (07/18) - PASP 64mmHg, EF 65-70%%. Appears euvolemic - on 25% albumin 50q8  Pulm: Extubated 7/6 - now on RA/NC saturating well. s/p Chest tube by CT surg (6/27--7/3)   GI: CLD. TPN/lipids, wound dehisense -- RTOR on 7/5 for exlap, washout, ileocolic resection with end ileostomy, blow hole colostomy; vicryl bridging mesh - vac placed 7/18 - now with blood tinged output, Continue Octreotide and cholestyramine.  : Voids.   ID: Perforated viscus, IR Cx 7/3: C. tertium, Lactobacillis. OR Cx 7/5 - rare yeast - Imipenem (6/30-7/12), Fluconazole (6/30 -7/12) OR Cx 6/28: Vanc-resistant/amp-sens (but allergic) enterococcus s/p CTX (6/26-6/30), Flagyl (6/26-6/30), Dapto (6/30-7/5). Survillence BCx - NGTD  Endo: ISS; Hx of hypothyroid: cont synthroid IV. Gout: holding home allopurionol  PPx: SCD, HSQ. LE duplex 7/13 neg.  Lines: PIV x 2, RUE PICC (6/30--), Victoria (7/8-7/10) // DC: R Chest tube for iatrogenic PTX (6/27--7/3), R TLC (06/26-30). R TLC (7/5 -7/12)  Wounds/Drains: midline incision; wound vac changes q48h; R JOYCE below ileostomy (D/C 7/17), L JOYCE at small bowel enterotomy repair.   PT/OT: OOB ambulating in castrejon   Dispo: SICU

## 2023-07-19 NOTE — PROGRESS NOTE ADULT - ATTENDING COMMENTS
AF, Crohn's, s/p SBR c/b anastomotic leak with ileocolic resection, ileostomy, blowhole colostomy  physical as above  continue off antibiotics  no diuresis today as BUN and creatinine slightly higher and his breathing is comfortable  continue amiodarone and metoprolol  TPN written wound care

## 2023-07-20 NOTE — PROGRESS NOTE ADULT - ASSESSMENT
78M S/P laparoscopic lysis of adhesions, converted to open lysis of adhesions, SBR x 3, temporary abdominal closure, S/P ex lap, removal of abthera, ileocolic resection, small bowel anastomosis c/b anastomotic leak resulting in wound dehiscence now POD8 RTOR exlap, washout, ileocolic resection with end ileostomy, blow hole colostomy, fistula, red rubber from ileostomy to small bowel anastomosis (removed); vicryl bridging mesh; R JOYCE below ileostomy, L JOYCE at small bowel enterotomy repair. Midline wound with vac in place, enteric content, bile and some blood noted on cannister. Tolerating clears. Wound vac changed today.    - Flush left drain   - Wound vac change every 2 days, next 7/22  - Plan discussed with Dr Peterson    78M S/P laparoscopic lysis of adhesions, converted to open lysis of adhesions, SBR x 3, temporary abdominal closure, S/P ex lap, removal of abthera, ileocolic resection, small bowel anastomosis c/b anastomotic leak resulting in wound dehiscence now POD8 RTOR exlap, washout, ileocolic resection with end ileostomy, blow hole colostomy, fistula, red rubber from ileostomy to small bowel anastomosis (removed); vicryl bridging mesh; R JOYCE below ileostomy, L JOYCE at small bowel enterotomy repair. Midline wound with vac in place, enteric content, bile and some blood noted on cannister. Tolerating clears. Wound vac changed today. Mesh removed.    - Flush left drain   - Wound vac change next 7/24  - Plan discussed with Dr Peterson

## 2023-07-20 NOTE — PROGRESS NOTE ADULT - SUBJECTIVE AND OBJECTIVE BOX
Pt seen and examined   no new complaints  tolerating clears    REVIEW OF SYSTEMS:  Constitutional: No fever,  Cardiovascular: No chest pain, palpitations, dizziness  Gastrointestinal: No abdominal or epigastric pain. No nausea, vomiting  Skin: No itching, burning, rashes or lesions       MEDICATIONS:  MEDICATIONS  (STANDING):  aMIOdarone    Tablet 200 milliGRAM(s) Oral daily  amLODIPine   Tablet 5 milliGRAM(s) Oral daily  atorvastatin 20 milliGRAM(s) Oral at bedtime  chlorhexidine 2% Cloths 1 Application(s) Topical <User Schedule>  cholestyramine Powder (Sugar-Free) 4 Gram(s) Oral two times a day  dextrose 5%. 1000 milliLiter(s) (50 mL/Hr) IV Continuous <Continuous>  dextrose 5%. 1000 milliLiter(s) (100 mL/Hr) IV Continuous <Continuous>  dextrose 50% Injectable 25 Gram(s) IV Push once  dextrose 50% Injectable 12.5 Gram(s) IV Push once  dextrose 50% Injectable 25 Gram(s) IV Push once  glucagon  Injectable 1 milliGRAM(s) IntraMuscular once  heparin   Injectable 5000 Unit(s) SubCutaneous every 8 hours  insulin lispro (ADMELOG) corrective regimen sliding scale   SubCutaneous every 6 hours  levothyroxine 50 MICROGram(s) Oral daily  lipid, fat emulsion (Fish Oil and Plant Based) 20% Infusion 0.5 Gm/kG/Day (21 mL/Hr) IV Continuous <Continuous>  losartan 25 milliGRAM(s) Oral daily  metoprolol tartrate 25 milliGRAM(s) Oral every 12 hours  octreotide  Injectable 100 MICROGram(s) IV Push every 8 hours  pantoprazole    Tablet 40 milliGRAM(s) Oral before breakfast  Parenteral Nutrition - Adult 1 Each (85 mL/Hr) TPN Continuous <Continuous>  Parenteral Nutrition - Adult 1 Each (85 mL/Hr) TPN Continuous <Continuous>  venlafaxine XR. 300 milliGRAM(s) Oral daily    MEDICATIONS  (PRN):  acetaminophen     Tablet .. 650 milliGRAM(s) Oral every 6 hours PRN Mild Pain (1 - 3)  benzocaine/menthol Lozenge 2 Lozenge Oral every 4 hours PRN Sore Throat  dextrose Oral Gel 15 Gram(s) Oral once PRN Blood Glucose LESS THAN 70 milliGRAM(s)/deciliter  hydrALAZINE Injectable 10 milliGRAM(s) IV Push every 6 hours PRN SBP > 140  melatonin 5 milliGRAM(s) Oral at bedtime PRN Sleep      Allergies    penicillin (Angioedema)    Intolerances        Vital Signs Last 24 Hrs  T(C): 37 (20 Jul 2023 09:00), Max: 37.2 (19 Jul 2023 16:00)  T(F): 98.6 (20 Jul 2023 09:00), Max: 98.9 (19 Jul 2023 16:00)  HR: 62 (20 Jul 2023 11:00) (62 - 74)  BP: 122/63 (20 Jul 2023 11:00) (108/54 - 166/66)  BP(mean): 87 (20 Jul 2023 11:00) (78 - 105)  RR: 36 (20 Jul 2023 11:00) (21 - 36)  SpO2: 93% (20 Jul 2023 11:00) (91% - 97%)    Parameters below as of 20 Jul 2023 11:00  Patient On (Oxygen Delivery Method): nasal cannula w/ humidification  O2 Flow (L/min): 2      07-19 @ 07:01 - 07-20 @ 07:00  --------------------------------------------------------  IN: 2749.6 mL / OUT: 2560 mL / NET: 189.6 mL    07-20 @ 07:01 - 07-20 @ 12:14  --------------------------------------------------------  IN: 580 mL / OUT: 100 mL / NET: 480 mL        PHYSICAL EXAM:    General:  in no acute distress  HEENT: MMM, conjunctiva and sclera clear  Gastrointestinal: Soft  non-distended; Normal bowel sounds; dressings, ileostomy , left drain  Skin: Warm and dry. No obvious rash    LABS:      CBC Full  -  ( 20 Jul 2023 05:30 )  WBC Count : 10.93 K/uL  RBC Count : 2.64 M/uL  Hemoglobin : 7.9 g/dL  Hematocrit : 24.1 %  Platelet Count - Automated : 328 K/uL  Mean Cell Volume : 91.3 fl  Mean Cell Hemoglobin : 29.9 pg  Mean Cell Hemoglobin Concentration : 32.8 gm/dL  Auto Neutrophil # : x  Auto Lymphocyte # : x  Auto Monocyte # : x  Auto Eosinophil # : x  Auto Basophil # : x  Auto Neutrophil % : x  Auto Lymphocyte % : x  Auto Monocyte % : x  Auto Eosinophil % : x  Auto Basophil % : x    07-20    132<L>  |  97  |  26<H>  ----------------------------<  134<H>  3.7   |  26  |  1.03    Ca    7.4<L>      20 Jul 2023 05:30  Phos  3.4     07-20  Mg     2.2     07-20    TPro  7.1  /  Alb  2.3<L>  /  TBili  5.8<H>  /  DBili  4.2<H>  /  AST  146<H>  /  ALT  106<H>  /  AlkPhos  114  07-20          Urinalysis Basic - ( 20 Jul 2023 05:30 )    Color: x / Appearance: x / SG: x / pH: x  Gluc: 134 mg/dL / Ketone: x  / Bili: x / Urobili: x   Blood: x / Protein: x / Nitrite: x   Leuk Esterase: x / RBC: x / WBC x   Sq Epi: x / Non Sq Epi: x / Bacteria: x                RADIOLOGY & ADDITIONAL STUDIES (The following images were personally reviewed):

## 2023-07-20 NOTE — PROGRESS NOTE ADULT - ATTENDING COMMENTS
AF, s/p SBR c/b dehiscence requiring ileocecal resection, ileostomy, blowhole colostomy with fistulization  physical as above  increase in bilirubin, RUQ U/S ordered  no tenderness  continue amiodarone and metoprolol  TPN written  follow off antibiotics  BP better on losartan, amlodipine  decision making of high complexity

## 2023-07-20 NOTE — CHART NOTE - NSCHARTNOTEFT_GEN_A_CORE
Admitting Diagnosis:   Patient is a 78y old  Male who presents with a chief complaint of sbo (20 Jul 2023 12:13)      PAST MEDICAL & SURGICAL HISTORY:  Essential hypertension  Hypertension      Atrial fibrillation  s/p cardioversion 2010 and 2014  Pt. reports 4 DCCV  Now on Amiodarone 200mg PO bid and Eliquis 5mg PO bid  Last DCCV 4 yrs ago at [facility]      Crohn's disease  s/p partial resection of ileum      Hyperlipidemia      Hypothyroidism      History of depression  On Venlafaxine ER 150mg PO bid      H/O knee surgery      History of cataract surgery          Current Nutrition Order:  TPN via central venous catheter: 455g Dex, 123g AA, 50g SMOF lipids. Provides 2539kcal, 123g protein, 1.42g/kg IBW protein, GIR 3.10mg/kg/min  2030mL bag running at 85ml/hr    PO: CLD      PO Intake: Good (%) [   ]  Fair (50-75%) [X   ] Poor (<25%) [   ]    GI Issues: No reports of N/V  Ileostomy w/stool and gas    Pain: Patient denied complaints of pain     Skin Integrity: Rudy 17, generalized trace edema  L. buttock stage II pressure injury, sacrum stage I pressure injury  L. JOYCE  Abdominal wound vac         Labs:   07-20    132<L>  |  97  |  26<H>  ----------------------------<  134<H>  3.7   |  26  |  1.03    Ca    7.4<L>      20 Jul 2023 05:30  Phos  3.4     07-20  Mg     2.2     07-20    TPro  7.1  /  Alb  2.3<L>  /  TBili  5.8<H>  /  DBili  4.2<H>  /  AST  146<H>  /  ALT  106<H>  /  AlkPhos  114  07-20    CAPILLARY BLOOD GLUCOSE      POCT Blood Glucose.: 119 mg/dL (20 Jul 2023 17:56)  POCT Blood Glucose.: 133 mg/dL (20 Jul 2023 10:55)  POCT Blood Glucose.: 134 mg/dL (20 Jul 2023 07:23)  POCT Blood Glucose.: 125 mg/dL (20 Jul 2023 01:04)      Medications:  MEDICATIONS  (STANDING):  aMIOdarone    Tablet 200 milliGRAM(s) Oral daily  amLODIPine   Tablet 5 milliGRAM(s) Oral daily  atorvastatin 20 milliGRAM(s) Oral at bedtime  chlorhexidine 2% Cloths 1 Application(s) Topical <User Schedule>  cholestyramine Powder (Sugar-Free) 4 Gram(s) Oral two times a day  dextrose 5%. 1000 milliLiter(s) (100 mL/Hr) IV Continuous <Continuous>  dextrose 5%. 1000 milliLiter(s) (50 mL/Hr) IV Continuous <Continuous>  dextrose 50% Injectable 25 Gram(s) IV Push once  dextrose 50% Injectable 12.5 Gram(s) IV Push once  dextrose 50% Injectable 25 Gram(s) IV Push once  glucagon  Injectable 1 milliGRAM(s) IntraMuscular once  heparin   Injectable 5000 Unit(s) SubCutaneous every 8 hours  insulin lispro (ADMELOG) corrective regimen sliding scale   SubCutaneous every 6 hours  levothyroxine 50 MICROGram(s) Oral daily  lipid, fat emulsion (Fish Oil and Plant Based) 20% Infusion 0.5 Gm/kG/Day (20.83 mL/Hr) IV Continuous <Continuous>  losartan 25 milliGRAM(s) Oral daily  metoprolol tartrate 25 milliGRAM(s) Oral every 12 hours  octreotide  Injectable 100 MICROGram(s) IV Push every 8 hours  pantoprazole    Tablet 40 milliGRAM(s) Oral before breakfast  Parenteral Nutrition - Adult 1 Each (85 mL/Hr) TPN Continuous <Continuous>  Parenteral Nutrition - Adult 1 Each (85 mL/Hr) TPN Continuous <Continuous>  venlafaxine XR. 300 milliGRAM(s) Oral daily    MEDICATIONS  (PRN):  acetaminophen     Tablet .. 650 milliGRAM(s) Oral every 6 hours PRN Mild Pain (1 - 3)  benzocaine/menthol Lozenge 2 Lozenge Oral every 4 hours PRN Sore Throat  dextrose Oral Gel 15 Gram(s) Oral once PRN Blood Glucose LESS THAN 70 milliGRAM(s)/deciliter  hydrALAZINE Injectable 10 milliGRAM(s) IV Push every 6 hours PRN SBP > 140  melatonin 5 milliGRAM(s) Oral at bedtime PRN Sleep      Weight: 101kg [5 July 2023]  Daily   99.9kg [9 July 2023]  Daily 102.1kg [10 July 2023]    Weight Change: Weight gain ? 2/2 fluid shifts, edema. Recommend daily weights for trending.     IBW: 86.4kg/190lbs  %IBW: 118.2%    Estimated energy needs:   Ideal body weight (86.4kg) used for calculations as pt >100% of IBW, BMI <30 per [facility] Standards of Care. Needs estimated for age and adjusted for current clinical status, increased needs for post-op & open abd wound healing    Calories: 0253-7093 kcals based on 30-35 kcals/kg  Protein: 129.6-172.8g based on 1.5-2g protein/kg   *Fluid needs per team    Subjective:   78M w/ Crohn's, AFib/Flutter s/p DCCVs on amiodarone, remote ileocectomy and open appy here for SBO vs Crohn’s flare, s/p NGT decompression and now s/p lap TRE converted to open TRE, SBR x 3, left in discontinuity with abthera vac on 6/27, RTOR for ileocolic resection, small bowel anastomosis, and abdominal wall closure on 6/28, and s/p IR aspiration of perihepatic fluid on 7/3, stepped down to telemetery on 7/4. wound dehiscence on 7/5, RTOR 7/5 for exlap, washout. Extubated 7/6. Continues on TPN and started on CLD on 7/12. Started on cholestyramine and octreotide. Seen by SLP on 7/14 and cleared for soft & bite sized diet as appropriate.     Patient seen in room, awake, alert, very pleasant. Breathing on 2LNC. TPN running at 85ml/hr. Observed dinner tray (CLD) at bedside, consumed ~40% of tray. Denied N/V. Ileostomy with stool and gas. Abdominal wound vac to suction. JOYCE draining. Afebrile. Elevated T/D Bili today and some jaundice/scleral icterus noted- RUQ U/S completed. Will continue to follow per RD protocol.        Previous Nutrition Diagnosis: Increased Nutrient Needs R/T physiological demands for nutrient AEB post-op wound healing    Active [  X ]  Resolved [   ]    If resolved, new PES:     Goal:  Pt will meet at least 75% of protein & energy needs via most appropriate route(s) for nutrition     Recommendations:  1. c/w TPN- consider change to 146g AA, 427g dextrose  - c/w 250ml SMOFlipids daily  - to provide 2535.8kcals, 29.3 kcals/kg, 1.68g protein/kg, GIR 2.90  2. Monitor GI tract viability  - advance diet as tolerated --> low fat, low fiber  - initiate TPN taper when consistent PO intake meeting >60% estimated needs  3. Weekly lipid panel  - hold if TG >400  4. Vitamins/minerals per team in TPN --> change to PO once on solely PO diet  5. Daily BMP/Mg/Phos, POC BG Q4-6hrs  6. Pain regimen per team     Education: discussed RD role, plan for future PO advancement     Risk Level: High [ X  ] Moderate [   ] Low [   ].

## 2023-07-20 NOTE — PROGRESS NOTE ADULT - SUBJECTIVE AND OBJECTIVE BOX
SUBJECTIVE:  Patient doing well seen during morning rounds. Wound vac holding suction. Blood tinged bile and stool noted on vac cannister. Tolerating clears.     MEDICATIONS  (STANDING):  aMIOdarone    Tablet 200 milliGRAM(s) Oral daily  amLODIPine   Tablet 5 milliGRAM(s) Oral daily  atorvastatin 20 milliGRAM(s) Oral at bedtime  chlorhexidine 2% Cloths 1 Application(s) Topical <User Schedule>  cholestyramine Powder (Sugar-Free) 4 Gram(s) Oral two times a day  dextrose 5%. 1000 milliLiter(s) (50 mL/Hr) IV Continuous <Continuous>  dextrose 5%. 1000 milliLiter(s) (100 mL/Hr) IV Continuous <Continuous>  dextrose 50% Injectable 25 Gram(s) IV Push once  dextrose 50% Injectable 12.5 Gram(s) IV Push once  dextrose 50% Injectable 25 Gram(s) IV Push once  glucagon  Injectable 1 milliGRAM(s) IntraMuscular once  heparin   Injectable 5000 Unit(s) SubCutaneous every 8 hours  insulin lispro (ADMELOG) corrective regimen sliding scale   SubCutaneous every 6 hours  levothyroxine 50 MICROGram(s) Oral daily  lipid, fat emulsion (Fish Oil and Plant Based) 20% Infusion 0.5 Gm/kG/Day (20.83 mL/Hr) IV Continuous <Continuous>  losartan 25 milliGRAM(s) Oral daily  metoprolol tartrate 25 milliGRAM(s) Oral every 12 hours  octreotide  Injectable 100 MICROGram(s) IV Push every 8 hours  pantoprazole    Tablet 40 milliGRAM(s) Oral before breakfast  Parenteral Nutrition - Adult 1 Each (85 mL/Hr) TPN Continuous <Continuous>  Parenteral Nutrition - Adult 1 Each (85 mL/Hr) TPN Continuous <Continuous>  venlafaxine XR. 300 milliGRAM(s) Oral daily    MEDICATIONS  (PRN):  acetaminophen     Tablet .. 650 milliGRAM(s) Oral every 6 hours PRN Mild Pain (1 - 3)  benzocaine/menthol Lozenge 2 Lozenge Oral every 4 hours PRN Sore Throat  dextrose Oral Gel 15 Gram(s) Oral once PRN Blood Glucose LESS THAN 70 milliGRAM(s)/deciliter  hydrALAZINE Injectable 10 milliGRAM(s) IV Push every 6 hours PRN SBP > 140  melatonin 5 milliGRAM(s) Oral at bedtime PRN Sleep      Drips:     ICU Vital Signs Last 24 Hrs  T(C): 36.9 (20 Jul 2023 13:00), Max: 37.2 (19 Jul 2023 16:00)  T(F): 98.5 (20 Jul 2023 13:00), Max: 98.9 (19 Jul 2023 16:00)  HR: 66 (20 Jul 2023 13:00) (62 - 74)  BP: 131/58 (20 Jul 2023 13:00) (117/55 - 166/66)  BP(mean): 83 (20 Jul 2023 13:00) (79 - 105)  ABP: --  ABP(mean): --  RR: 31 (20 Jul 2023 13:00) (21 - 36)  SpO2: 93% (20 Jul 2023 13:00) (91% - 97%)    O2 Parameters below as of 20 Jul 2023 11:00  Patient On (Oxygen Delivery Method): nasal cannula w/ humidification  O2 Flow (L/min): 2        Physical exam  General: NAD, resting comfortably in bed  C/V: NSR  Pulm: Nonlabored breathing, no respiratory distress  Abd: soft, nondistended, midline laparotomy w/ vac in place w/ good suction, brown enteric output in cannister. Ostomy ppp w/ gas and brown output in pouch. Mucous fistula appreciated. R JOYCE w/ scant serous output, L JOYCE = w/ bilious output.  Extrem: WWP, no edema, SCDs in place      I&O's Summary    19 Jul 2023 07:01  -  20 Jul 2023 07:00  --------------------------------------------------------  IN: 2749.6 mL / OUT: 2560 mL / NET: 189.6 mL    20 Jul 2023 07:01  -  20 Jul 2023 13:46  --------------------------------------------------------  IN: 750 mL / OUT: 500 mL / NET: 250 mL        LABS:                        7.9    10.93 )-----------( 328      ( 20 Jul 2023 05:30 )             24.1     07-20    132<L>  |  97  |  26<H>  ----------------------------<  134<H>  3.7   |  26  |  1.03    Ca    7.4<L>      20 Jul 2023 05:30  Phos  3.4     07-20  Mg     2.2     07-20    TPro  7.1  /  Alb  2.3<L>  /  TBili  5.8<H>  /  DBili  4.2<H>  /  AST  146<H>  /  ALT  106<H>  /  AlkPhos  114  07-20      Urinalysis Basic - ( 20 Jul 2023 05:30 )    Color: x / Appearance: x / SG: x / pH: x  Gluc: 134 mg/dL / Ketone: x  / Bili: x / Urobili: x   Blood: x / Protein: x / Nitrite: x   Leuk Esterase: x / RBC: x / WBC x   Sq Epi: x / Non Sq Epi: x / Bacteria: x      CAPILLARY BLOOD GLUCOSE      POCT Blood Glucose.: 133 mg/dL (20 Jul 2023 10:55)  POCT Blood Glucose.: 134 mg/dL (20 Jul 2023 07:23)  POCT Blood Glucose.: 125 mg/dL (20 Jul 2023 01:04)  POCT Blood Glucose.: 118 mg/dL (19 Jul 2023 18:19)    LIVER FUNCTIONS - ( 20 Jul 2023 05:30 )  Alb: 2.3 g/dL / Pro: 7.1 g/dL / ALK PHOS: 114 U/L / ALT: 106 U/L / AST: 146 U/L / GGT: x

## 2023-07-20 NOTE — PROGRESS NOTE ADULT - ASSESSMENT
78M POD23 laparoscopic lysis of adhesions, converted to open lysis of adhesions, SBR x 3, temporary abdominal closure, POD22 ex lap, removal of abthera, ileocolic resection, small bowel anastomosis c/b anastomotic leak resulting in wound dehiscence now POD15 RTOR exlap, washout, ileocolic resection with end ileostomy, blow hole colostomy, fistula, red rubber from ileostomy to small bowel anastomosis; vicryl bridging mesh; R JOYCE below ileostomy, L JOYCE at small bowel enterotomy repair.    Vac change today  Appreciate excellent SICU care

## 2023-07-20 NOTE — PROGRESS NOTE ADULT - SUBJECTIVE AND OBJECTIVE BOX
ON: bilious output leaking around vac sponge, LLQ, vac changed with approx 250cc of thin stool suctioned (excluding 250cc irrigation used) some skin oozing noted primarily right side , SBP high 150s, given Hydral 10mg prn w/ improv to 140s       SUBJECTIVE: Patient seen and examined at bedside with ICU team during AM rounds. Patient without acute complaints this AM. Reports he feels well. Denies abdominal pain or distention. Reports he has been tolerating liquids without nausea.       MEDICATIONS  (STANDING):  aMIOdarone    Tablet 200 milliGRAM(s) Oral daily  amLODIPine   Tablet 5 milliGRAM(s) Oral daily  atorvastatin 20 milliGRAM(s) Oral at bedtime  chlorhexidine 2% Cloths 1 Application(s) Topical <User Schedule>  cholestyramine Powder (Sugar-Free) 4 Gram(s) Oral two times a day  dextrose 5%. 1000 milliLiter(s) (50 mL/Hr) IV Continuous <Continuous>  dextrose 5%. 1000 milliLiter(s) (100 mL/Hr) IV Continuous <Continuous>  dextrose 50% Injectable 25 Gram(s) IV Push once  dextrose 50% Injectable 12.5 Gram(s) IV Push once  dextrose 50% Injectable 25 Gram(s) IV Push once  glucagon  Injectable 1 milliGRAM(s) IntraMuscular once  heparin   Injectable 5000 Unit(s) SubCutaneous every 8 hours  insulin lispro (ADMELOG) corrective regimen sliding scale   SubCutaneous every 6 hours  levothyroxine 50 MICROGram(s) Oral daily  lipid, fat emulsion (Fish Oil and Plant Based) 20% Infusion 0.5 Gm/kG/Day (20.83 mL/Hr) IV Continuous <Continuous>  losartan 25 milliGRAM(s) Oral daily  metoprolol tartrate 25 milliGRAM(s) Oral every 12 hours  octreotide  Injectable 100 MICROGram(s) IV Push every 8 hours  pantoprazole  Injectable 40 milliGRAM(s) IV Push daily  Parenteral Nutrition - Adult 1 Each (85 mL/Hr) TPN Continuous <Continuous>  potassium chloride   Powder 40 milliEquivalent(s) Oral once  venlafaxine XR. 300 milliGRAM(s) Oral daily    MEDICATIONS  (PRN):  acetaminophen     Tablet .. 650 milliGRAM(s) Oral every 6 hours PRN Mild Pain (1 - 3)  benzocaine/menthol Lozenge 2 Lozenge Oral every 4 hours PRN Sore Throat  dextrose Oral Gel 15 Gram(s) Oral once PRN Blood Glucose LESS THAN 70 milliGRAM(s)/deciliter  hydrALAZINE Injectable 10 milliGRAM(s) IV Push every 6 hours PRN SBP > 140  melatonin 5 milliGRAM(s) Oral at bedtime PRN Sleep      ICU Vital Signs Last 24 Hrs  T(C): 36.8 (20 Jul 2023 05:11), Max: 37.4 (19 Jul 2023 09:00)  T(F): 98.2 (20 Jul 2023 05:11), Max: 99.4 (19 Jul 2023 09:00)  HR: 69 (20 Jul 2023 07:00) (63 - 74)  BP: 159/70 (20 Jul 2023 07:00) (108/54 - 166/66)  BP(mean): 101 (20 Jul 2023 07:00) (78 - 105)  RR: 33 (20 Jul 2023 07:00) (21 - 33)  SpO2: 94% (20 Jul 2023 07:00) (91% - 97%)    O2 Parameters below as of 20 Jul 2023 07:00  Patient On (Oxygen Delivery Method): nasal cannula  O2 Flow (L/min): 2          Physical Exam:  General: NAD, Resting comfortably   HEENT: NC/AT, EOMI, PERRLA, normal hearing, no oral lesions, neck supple w/o LAD, no JVD  Pulmonary: Nonlabored breathing, no respiratory distress, CTA-B, no wheezing appreciated  Cardiovascular: NSR, no murmurs  Abdominal: soft, nondistended, nontender. No rebound, no guarding. Midline wound vac in place with brown output. JOYCE x1 noted to be bilious. ostomy in RUQ with minimal stool/gas.  Extremities: WWP, 5/5 strength x 4, appropriate capillary refill in all extremities. Mild dependent edema in LE  Neuro: A/O x3, CNs II-XII grossly intact, normal motor/sensation, no focal deficits  Pulses: palpable distal pulses        I&O's Summary    19 Jul 2023 07:01  -  20 Jul 2023 07:00  --------------------------------------------------------  IN: 2749.6 mL / OUT: 2535 mL / NET: 214.6 mL    20 Jul 2023 07:01  -  20 Jul 2023 08:09  --------------------------------------------------------  IN: 85 mL / OUT: 0 mL / NET: 85 mL        LABS:                        7.9    10.93 )-----------( 328      ( 20 Jul 2023 05:30 )             24.1     07-20    132<L>  |  97  |  26<H>  ----------------------------<  134<H>  3.7   |  26  |  1.03    Ca    7.4<L>      20 Jul 2023 05:30  Phos  3.4     07-20  Mg     2.2     07-20    TPro  x   /  Alb  2.6<L>  /  TBili  x   /  DBili  x   /  AST  x   /  ALT  x   /  AlkPhos  x   07-19      Urinalysis Basic - ( 20 Jul 2023 05:30 )    Color: x / Appearance: x / SG: x / pH: x  Gluc: 134 mg/dL / Ketone: x  / Bili: x / Urobili: x   Blood: x / Protein: x / Nitrite: x   Leuk Esterase: x / RBC: x / WBC x   Sq Epi: x / Non Sq Epi: x / Bacteria: x      CAPILLARY BLOOD GLUCOSE      POCT Blood Glucose.: 134 mg/dL (20 Jul 2023 07:23)  POCT Blood Glucose.: 125 mg/dL (20 Jul 2023 01:04)  POCT Blood Glucose.: 118 mg/dL (19 Jul 2023 18:19)  POCT Blood Glucose.: 118 mg/dL (19 Jul 2023 12:33)    LIVER FUNCTIONS - ( 19 Jul 2023 05:25 )  Alb: 2.6 g/dL / Pro: x     / ALK PHOS: x     / ALT: x     / AST: x     / GGT: x             Cultures:    RADIOLOGY & ADDITIONAL STUDIES:

## 2023-07-20 NOTE — PROGRESS NOTE ADULT - SUBJECTIVE AND OBJECTIVE BOX
INTERVAL HPI/OVERNIGHT EVENTS: bilious output leaking around vac sponge, LLQ, vac changed with approx 250cc of thin stool suctioned (excluding 250cc irrigation used) some skin oozing noted primarily right side , SBP high 150s, given Hydral 10mg prn w/ improv to 140s     STATUS POST:    6/27: Laparoscopic lysis of adhesions, converted to open lysis of adhesions, SBR x 3, temporary abdominal closure, 5L cryst, 1L 5% alb, 3 pRBC, 1 FFP, 1 plts  6/28: ex lap, removal of abthera, ileocolic resection, small bowel anastamosis, , 1.6 crystalloid, 250 5%, 175 uop   7/3: IR Gendel drained perihepatic aspiration of serous fluid.   7/5: RTOR exlap, washout, ileocolic resection with end ileostomy, blow hole colostomy, fistula, red rubber from ileostomy to small bowel anastomosis; vicryl bridging mesh; R JOYCE below ileostomy, L JOYCE at small bowel enterotomy repair; 500 LR, 500 5% albumin, 3u PRBC, 2 FFP, 400 UOP,     SUBJECTIVE: Patient seen and examined at bedside. In good spirits, denies abdominal pain, able to rhoda PO without n/v. Denies cp, sob.      aMIOdarone    Tablet 200 milliGRAM(s) Oral daily  amLODIPine   Tablet 5 milliGRAM(s) Oral daily  heparin   Injectable 5000 Unit(s) SubCutaneous every 8 hours  hydrALAZINE Injectable 10 milliGRAM(s) IV Push every 6 hours PRN  losartan 25 milliGRAM(s) Oral daily  metoprolol tartrate 25 milliGRAM(s) Oral every 12 hours      Vital Signs Last 24 Hrs  T(C): 36.9 (20 Jul 2023 13:00), Max: 37.2 (19 Jul 2023 16:00)  T(F): 98.5 (20 Jul 2023 13:00), Max: 98.9 (19 Jul 2023 16:00)  HR: 68 (20 Jul 2023 14:00) (62 - 74)  BP: 137/65 (20 Jul 2023 14:00) (120/57 - 166/66)  BP(mean): 93 (20 Jul 2023 14:00) (82 - 105)  RR: 27 (20 Jul 2023 14:00) (21 - 36)  SpO2: 93% (20 Jul 2023 14:00) (91% - 97%)    Parameters below as of 20 Jul 2023 14:00  Patient On (Oxygen Delivery Method): nasal cannula w/ humidification  O2 Flow (L/min): 2    I&O's Detail    19 Jul 2023 07:01  -  20 Jul 2023 07:00  --------------------------------------------------------  IN:    Fat Emulsion (Fish Oil &amp; Plant Based) 20% Infusion: 249.6 mL    Oral Fluid: 480 mL    TPN (Total Parenteral Nutrition): 2020 mL  Total IN: 2749.6 mL    OUT:    Bulb (mL): 25 mL    Fat Emulsion (Fish Oil &amp; Plant Based) 20% Infusion: 0 mL    Ileostomy (mL): 35 mL    VAC (Vacuum Assisted Closure) System (mL): 1225 mL    Voided (mL): 1275 mL  Total OUT: 2560 mL    Total NET: 189.6 mL      20 Jul 2023 07:01  -  20 Jul 2023 14:28  --------------------------------------------------------  IN:    Oral Fluid: 240 mL    TPN (Total Parenteral Nutrition): 595 mL  Total IN: 835 mL    OUT:    Bulb (mL): 100 mL    Fat Emulsion (Fish Oil &amp; Plant Based) 20% Infusion: 0 mL    Ileostomy (mL): 0 mL    VAC (Vacuum Assisted Closure) System (mL): 250 mL    Voided (mL): 550 mL  Total OUT: 900 mL    Total NET: -65 mL          General: NAD, resting comfortably in bed  C/V: NSR  Pulm: Nonlabored breathing, no respiratory distress  Abd: soft, nontender, mildly distended. Vac in place w/ good suction. JOYCE to wall suction w/ minimal bilious output.  Extrem: WWP, no edema, SCDs in place    LABS:                        7.9    10.93 )-----------( 328      ( 20 Jul 2023 05:30 )             24.1     07-20    132<L>  |  97  |  26<H>  ----------------------------<  134<H>  3.7   |  26  |  1.03    Ca    7.4<L>      20 Jul 2023 05:30  Phos  3.4     07-20  Mg     2.2     07-20    TPro  7.1  /  Alb  2.3<L>  /  TBili  5.8<H>  /  DBili  4.2<H>  /  AST  146<H>  /  ALT  106<H>  /  AlkPhos  114  07-20      Urinalysis Basic - ( 20 Jul 2023 05:30 )    Color: x / Appearance: x / SG: x / pH: x  Gluc: 134 mg/dL / Ketone: x  / Bili: x / Urobili: x   Blood: x / Protein: x / Nitrite: x   Leuk Esterase: x / RBC: x / WBC x   Sq Epi: x / Non Sq Epi: x / Bacteria: x        RADIOLOGY & ADDITIONAL STUDIES:

## 2023-07-20 NOTE — PROGRESS NOTE ADULT - ASSESSMENT
78M w/ Crohn's, AFib/Flutter s/p DCCVs on amiodarone, remote ileocectomy and open appy here for SBO vs Crohns flare, s/p NGT decompression and now s/p lap TRE converted to open TRE, SBR x 3, left in discontinuity with abthera vac on 6/27, RTOR for ileocolic resection, small bowel anastomosis, and abdominal wall closure on 6/28, and s/p IR aspiration of perihepatic fluid on 7/3, stepped down to telemetery on 7/4. wound dehisence on 7/5, RTOR exlap, washout, ileocolic resection with end ileostomy, blow hole colostomy, red rubber from ileostomy to small bowel anastomosis; vicryl bridging mesh on 7/5. Transferred to SICU post op for HD monitoring. Extubated 7/6.    Neuro: Tylenol PRN for pain. Hx anxiety: Continue venlafaxine  CV: MAP > 65. Hx of Afib/flutter: s/p DCCV, on PO amiodarone, metoprolol. Hx of HTN- continue home losartan and continue hydralazine. Starting amlodipine 5 daily. IV PRN for SBP < 140. Hx of HLD: continue home rosuvastatin; Repeat TTE  (07/18) - PASP 64mmHg, EF 65-70%%. Appears euvolemic - no diuresis today  Pulm: Extubated 7/6 - now on RA/NC saturating well. s/p Chest tube by CT surg (6/27--7/3)   GI: CLD. TPN/lipids, wound dehisense -- RTOR on 7/5 for exlap, washout, ileocolic resection with end ileostomy, blow hole colostomy; vicryl bridging mesh explanted today and vac replaced - Continue Octreotide and cholestyramine. T bili elevated today with scleral icterus - RUQ duplex today.  : Voids.   ID: Not currently on antibiotics. Perforated viscus, IR Cx 7/3: C. tertium, Lactobacillis. OR Cx 7/5 - rare yeast - Imipenem (6/30-7/12), Fluconazole (6/30 -7/12) OR Cx 6/28: Vanc-resistant/amp-sens (but allergic) enterococcus s/p CTX (6/26-6/30), Flagyl (6/26-6/30), Dapto (6/30-7/5).  Endo: ISS; Hx of hypothyroid: cont synthroid IV. Gout: holding home allopurionol  PPx: SCD, HSQ. LE duplex 7/13 neg.  Lines: PIV x 2, RUE PICC (6/30--), [place] (7/8-7/10) // DC: R Chest tube for iatrogenic PTX (6/27--7/3), R TLC (06/26-30). R TLC (7/5 -7/12)  Wounds/Drains: midline incision; wound vac changes q48h with fistula opening; R JOYCE below ileostomy (D/C 7/17), L JOYCE at small bowel enterotomy repair.   PT/OT: OOB ambulating in [place]   Dispo: SICU

## 2023-07-21 NOTE — PROGRESS NOTE ADULT - ASSESSMENT
78M S/P laparoscopic lysis of adhesions, converted to open lysis of adhesions, SBR x 3, temporary abdominal closure, S/P ex lap, removal of abthera, ileocolic resection, small bowel anastomosis c/b anastomotic leak resulting in wound dehiscence now POD8 RTOR exlap, washout, ileocolic resection with end ileostomy, blow hole colostomy, fistula, red rubber from ileostomy to small bowel anastomosis (removed); vicryl bridging mesh; R JOYCE below ileostomy, L JOYCE at small bowel enterotomy repair. Midline wound with vac in place, enteric content, bile and some blood noted on cannister. Tolerating clears. Wound vac changed today. Mesh removed. RUQ US 0.4 gallbladder wall, no evidence of portal or hepatic venous thrombosis. Fluid and sludge distended gallbladder.    - Continue clears  - Strict I&Os including fistula output    - Wound vac change next 7/24  - Plan discussed with Dr Peterson

## 2023-07-21 NOTE — PROGRESS NOTE ADULT - ASSESSMENT
78M w/ Crohn's, AFib/Flutter s/p DCCVs on amiodarone, remote ileocectomy and open appy here for SBO vs Crohns flare, s/p NGT decompression and now s/p lap TRE converted to open TRE, SBR x 3, left in discontinuity with abthera vac on 6/27, RTOR for ileocolic resection, small bowel anastomosis, and abdominal wall closure on 6/28, and s/p IR aspiration of perihepatic fluid on 7/3, stepped down to telemetery on 7/4. wound dehisence on 7/5, RTOR exlap, washout, ileocolic resection with end ileostomy, blow hole colostomy, red rubber from ileostomy to small bowel anastomosis; vicryl bridging mesh on 7/5. Transferred to SICU post op for HD monitoring. Extubated 7/6.    Neuro: Tylenol PRN for pain. Hx anxiety: Continue venlafaxine  CV: MAP > 65. Hx of Afib/flutter: s/p DCCV, on PO amiodarone, metoprolol. Hx of HTN- continue home losartan and continue hydralazine. Starting amlodipine 5 daily. IV PRN for SBP < 140. Hx of HLD: continue home rosuvastatin; Repeat TTE  (07/18) - PASP 64mmHg, EF 65-70%%. Appears euvolemic - no diuresis today  Pulm: Extubated 7/6 - now on RA/NC saturating well. s/p Chest tube by CT surg (6/27--7/3)   GI: CLD. TPN/lipids, wound dehisense -- RTOR on 7/5 for exlap, washout, ileocolic resection with end ileostomy, blow hole colostomy; vicryl bridging mesh explanted today and vac replaced - Continue Octreotide and cholestyramine. T bili elevated today with scleral icterus - RUQ duplex today.  : Voids.   ID: Not currently on antibiotics. Perforated viscus, IR Cx 7/3: C. tertium, Lactobacillis. OR Cx 7/5 - rare yeast - Imipenem (6/30-7/12), Fluconazole (6/30 -7/12) OR Cx 6/28: Vanc-resistant/amp-sens (but allergic) enterococcus s/p CTX (6/26-6/30), Flagyl (6/26-6/30), Dapto (6/30-7/5).  Endo: ISS; Hx of hypothyroid: cont synthroid IV. Gout: holding home allopurionol  PPx: SCD, HSQ. LE duplex 7/13 neg.  Lines: PIV x 2, RUE PICC (6/30--), [place] (7/8-7/10) // DC: R Chest tube for iatrogenic PTX (6/27--7/3), R TLC (06/26-30). R TLC (7/5 -7/12)  Wounds/Drains: midline incision; wound vac changes q48h with fistula opening; R JOYCE below ileostomy (D/C 7/17), L JOYCE at small bowel enterotomy repair.   PT/OT: OOB ambulating in [place]   Dispo: SICU   78M w/ Crohn's, AFib/Flutter s/p DCCVs on amiodarone, remote ileocectomy and open appy here for SBO vs Crohns flare, s/p NGT decompression and now s/p lap TRE converted to open TRE, SBR x 3, left in discontinuity with abthera vac on 6/27, RTOR for ileocolic resection, small bowel anastomosis, and abdominal wall closure on 6/28, and s/p IR aspiration of perihepatic fluid on 7/3, stepped down to telemetery on 7/4. wound dehisence on 7/5, RTOR exlap, washout, ileocolic resection with end ileostomy, blow hole colostomy, red rubber from ileostomy to small bowel anastomosis; vicryl bridging mesh on 7/5. Transferred to SICU post op for HD monitoring. Extubated 7/6.    Neuro: Tylenol PRN for pain. Hx anxiety: Continue venlafaxine  CV: MAP > 65. Hx of Afib/flutter: s/p DCCV, on PO amiodarone, metoprolol. Hx of HTN- continue home losartan and continue hydralazine. Starting amlodipine 5 daily. IV PRN for SBP < 140. Hx of HLD: continue home rosuvastatin; Repeat TTE  (07/18) - PASP 64mmHg, EF 65-70%%. Appears euvolemic - no diuresis today  Pulm: Extubated 7/6 - now on RA/NC saturating well. s/p Chest tube by CT surg (6/27--7/3)   GI: CLD. TPN/lipids, wound dehisense -- RTOR on 7/5 for exlap, washout, ileocolic resection with end ileostomy, blow hole colostomy; vicryl bridging mesh explanted today and vac replaced - Continue Octreotide and cholestyramine. T bili elevated today with scleral icterus - RUQ duplex without evidence of cholelithiasis or cholecystitis   : Voids.   ID: Not currently on antibiotics. Perforated viscus, IR Cx 7/3: C. tertium, Lactobacillis. OR Cx 7/5 - rare yeast - Imipenem (6/30-7/12), Fluconazole (6/30 -7/12) OR Cx 6/28: Vanc-resistant/amp-sens (but allergic) enterococcus s/p CTX (6/26-6/30), Flagyl (6/26-6/30), Dapto (6/30-7/5).  Endo: ISS; Hx of hypothyroid: cont synthroid IV. Gout: holding home allopurionol  PPx: SCD, HSQ. LE duplex 7/13 neg.  Lines: PIV x 2, RUE PICC (6/30--), [place] (7/8-7/10) // DC: R Chest tube for iatrogenic PTX (6/27--7/3), R TLC (06/26-30). R TLC (7/5 -7/12)  Wounds/Drains: midline incision; wound vac changes q48h with fistula opening; R JOYCE below ileostomy (D/C 7/17), L JOYCE at small bowel enterotomy repair.   PT/OT: OOB ambulating in [place]   Dispo: SICU

## 2023-07-21 NOTE — PROGRESS NOTE ADULT - SUBJECTIVE AND OBJECTIVE BOX
INTERVAL HPI/OVERNIGHT EVENTS: RUQ US completed - prelim read - patent portal vessels; gb distension and sludge, no stones, no acute cony; AMRITA    STATUS POST:   6/27: Laparoscopic lysis of adhesions, converted to open lysis of adhesions, SBR x 3, temporary abdominal closure, 5L cryst, 1L 5% alb, 3 pRBC, 1 FFP, 1 plts  6/28: ex lap, removal of abthera, ileocolic resection, small bowel anastamosis, , 1.6 crystalloid, 250 5%, 175 uop   7/3: IR Gendel drained perihepatic aspiration of serous fluid.   7/5: RTOR exlap, washout, ileocolic resection with end ileostomy, blow hole colostomy, fistula, red rubber from ileostomy to small bowel anastomosis; vicryl bridging mesh; R JOYCE below ileostomy, L JOYCE at small bowel enterotomy repair; 500 LR, 500 5% albumin, 3u PRBC, 2 FFP, 400 UOP,     SUBJECTIVE: Patient seen and examined at bedside. Pain is well-controlled, able to rhoda PO without n/v or RUQ pain. Denies cp, sob.      aMIOdarone    Tablet 200 milliGRAM(s) Oral daily  amLODIPine   Tablet 5 milliGRAM(s) Oral daily  heparin   Injectable 5000 Unit(s) SubCutaneous every 8 hours  hydrALAZINE Injectable 10 milliGRAM(s) IV Push every 6 hours PRN  losartan 25 milliGRAM(s) Oral daily  metoprolol tartrate 25 milliGRAM(s) Oral every 12 hours      Vital Signs Last 24 Hrs  T(C): 37.6 (21 Jul 2023 04:54), Max: 37.6 (21 Jul 2023 04:54)  T(F): 99.6 (21 Jul 2023 04:54), Max: 99.6 (21 Jul 2023 04:54)  HR: 74 (21 Jul 2023 09:00) (62 - 75)  BP: 135/63 (21 Jul 2023 09:00) (120/59 - 153/67)  BP(mean): 90 (21 Jul 2023 09:00) (83 - 107)  RR: 29 (21 Jul 2023 09:00) (20 - 44)  SpO2: 94% (21 Jul 2023 09:00) (90% - 96%)    Parameters below as of 21 Jul 2023 09:00  Patient On (Oxygen Delivery Method): nasal cannula  O2 Flow (L/min): 2    I&O's Detail    20 Jul 2023 07:01  -  21 Jul 2023 07:00  --------------------------------------------------------  IN:    Fat Emulsion (Fish Oil &amp; Plant Based) 20% Infusion: 83.2 mL    Oral Fluid: 720 mL    TPN (Total Parenteral Nutrition): 1190 mL  Total IN: 1993.2 mL    OUT:    Bulb (mL): 150 mL    Fat Emulsion (Fish Oil &amp; Plant Based) 20% Infusion: 0 mL    Ileostomy (mL): 50 mL    VAC (Vacuum Assisted Closure) System (mL): 1250 mL    Voided (mL): 1950 mL  Total OUT: 3400 mL    Total NET: -1406.8 mL      21 Jul 2023 07:01  -  21 Jul 2023 09:18  --------------------------------------------------------  IN:    TPN (Total Parenteral Nutrition): 170 mL  Total IN: 170 mL    OUT:    Fat Emulsion (Fish Oil &amp; Plant Based) 20% Infusion: 0 mL    Voided (mL): 100 mL  Total OUT: 100 mL    Total NET: 70 mL          General: NAD, resting comfortably in bed  C/V: NSR  Pulm: Nonlabored breathing, no respiratory distress  Abd: soft, nontender, minimally distended. Vac in place w/ good suction and enteric output in canister. Ostomy ppp w/ some gas and stool in pouch. L JOYCE bilious output. EC fistula w/ overlying pouch.  Extrem: WWP, no edema, SCDs in place        LABS:                        8.4    12.66 )-----------( 344      ( 21 Jul 2023 06:07 )             26.2     07-21    133<L>  |  98  |  22  ----------------------------<  131<H>  4.2   |  23  |  0.97    Ca    7.3<L>      21 Jul 2023 06:07  Phos  3.2     07-21  Mg     2.2     07-21    TPro  7.7  /  Alb  2.2<L>  /  TBili  5.8<H>  /  DBili  4.2<H>  /  AST  148<H>  /  ALT  106<H>  /  AlkPhos  116  07-21      Urinalysis Basic - ( 21 Jul 2023 06:07 )    Color: x / Appearance: x / SG: x / pH: x  Gluc: 131 mg/dL / Ketone: x  / Bili: x / Urobili: x   Blood: x / Protein: x / Nitrite: x   Leuk Esterase: x / RBC: x / WBC x   Sq Epi: x / Non Sq Epi: x / Bacteria: x        RADIOLOGY & ADDITIONAL STUDIES:

## 2023-07-21 NOTE — PROGRESS NOTE ADULT - SUBJECTIVE AND OBJECTIVE BOX
SUBJECTIVE: Patient doing well seen during morning rounds. Wound vac holding suction. Blood tinged bile and stool noted on vac cannister. Tolerating clears.       MEDICATIONS  (STANDING):  aMIOdarone    Tablet 200 milliGRAM(s) Oral daily  amLODIPine   Tablet 5 milliGRAM(s) Oral daily  atorvastatin 20 milliGRAM(s) Oral at bedtime  chlorhexidine 2% Cloths 1 Application(s) Topical <User Schedule>  cholestyramine Powder (Sugar-Free) 4 Gram(s) Oral two times a day  dextrose 5%. 1000 milliLiter(s) (100 mL/Hr) IV Continuous <Continuous>  dextrose 5%. 1000 milliLiter(s) (50 mL/Hr) IV Continuous <Continuous>  dextrose 50% Injectable 25 Gram(s) IV Push once  dextrose 50% Injectable 12.5 Gram(s) IV Push once  dextrose 50% Injectable 25 Gram(s) IV Push once  glucagon  Injectable 1 milliGRAM(s) IntraMuscular once  heparin   Injectable 5000 Unit(s) SubCutaneous every 8 hours  insulin lispro (ADMELOG) corrective regimen sliding scale   SubCutaneous every 6 hours  levothyroxine 50 MICROGram(s) Oral daily  losartan 25 milliGRAM(s) Oral daily  metoprolol tartrate 25 milliGRAM(s) Oral every 12 hours  octreotide  Injectable 100 MICROGram(s) IV Push every 8 hours  pantoprazole    Tablet 40 milliGRAM(s) Oral before breakfast  Parenteral Nutrition - Adult 1 Each (85 mL/Hr) TPN Continuous <Continuous>  venlafaxine XR. 300 milliGRAM(s) Oral daily    MEDICATIONS  (PRN):  acetaminophen     Tablet .. 650 milliGRAM(s) Oral every 6 hours PRN Mild Pain (1 - 3)  benzocaine/menthol Lozenge 2 Lozenge Oral every 4 hours PRN Sore Throat  dextrose Oral Gel 15 Gram(s) Oral once PRN Blood Glucose LESS THAN 70 milliGRAM(s)/deciliter  hydrALAZINE Injectable 10 milliGRAM(s) IV Push every 6 hours PRN SBP > 140  melatonin 5 milliGRAM(s) Oral at bedtime PRN Sleep      Drips:     ICU Vital Signs Last 24 Hrs  T(C): 37.3 (21 Jul 2023 09:22), Max: 37.6 (21 Jul 2023 04:54)  T(F): 99.2 (21 Jul 2023 09:22), Max: 99.6 (21 Jul 2023 04:54)  HR: 65 (21 Jul 2023 11:00) (65 - 75)  BP: 138/60 (21 Jul 2023 11:00) (125/70 - 153/67)  BP(mean): 86 (21 Jul 2023 11:00) (83 - 107)  ABP: --  ABP(mean): --  RR: 29 (21 Jul 2023 11:00) (20 - 44)  SpO2: 93% (21 Jul 2023 11:00) (90% - 96%)    O2 Parameters below as of 21 Jul 2023 11:00  Patient On (Oxygen Delivery Method): room air            Physical exam  General: NAD, resting comfortably in bed  C/V: NSR  Pulm: Nonlabored breathing, no respiratory distress  Abd: soft, nondistended, midline laparotomy w/ vac in place w/ good suction, brown enteric output in cannister. Ostomy ppp w/ gas and brown output in pouch. Mucous fistula appreciated. R JOYCE w/ scant serous output, L JOYCE = w/ bilious output.  Extrem: WWP, no edema, SCDs in place      I&O's Summary    20 Jul 2023 07:01  -  21 Jul 2023 07:00  --------------------------------------------------------  IN: 1993.2 mL / OUT: 3400 mL / NET: -1406.8 mL    21 Jul 2023 07:01  -  21 Jul 2023 11:34  --------------------------------------------------------  IN: 580 mL / OUT: 300 mL / NET: 280 mL        LABS:                        8.4    12.66 )-----------( 344      ( 21 Jul 2023 06:07 )             26.2     07-21    133<L>  |  98  |  22  ----------------------------<  131<H>  4.2   |  23  |  0.97    Ca    7.3<L>      21 Jul 2023 06:07  Phos  3.2     07-21  Mg     2.2     07-21    TPro  7.7  /  Alb  2.2<L>  /  TBili  5.8<H>  /  DBili  4.2<H>  /  AST  148<H>  /  ALT  106<H>  /  AlkPhos  116  07-21      Urinalysis Basic - ( 21 Jul 2023 06:07 )    Color: x / Appearance: x / SG: x / pH: x  Gluc: 131 mg/dL / Ketone: x  / Bili: x / Urobili: x   Blood: x / Protein: x / Nitrite: x   Leuk Esterase: x / RBC: x / WBC x   Sq Epi: x / Non Sq Epi: x / Bacteria: x      CAPILLARY BLOOD GLUCOSE      POCT Blood Glucose.: 130 mg/dL (21 Jul 2023 11:07)  POCT Blood Glucose.: 137 mg/dL (21 Jul 2023 05:44)  POCT Blood Glucose.: 130 mg/dL (20 Jul 2023 23:05)  POCT Blood Glucose.: 119 mg/dL (20 Jul 2023 17:56)    LIVER FUNCTIONS - ( 21 Jul 2023 06:07 )  Alb: 2.2 g/dL / Pro: 7.7 g/dL / ALK PHOS: 116 U/L / ALT: 106 U/L / AST: 148 U/L / GGT: x             Cultures:    RADIOLOGY & ADDITIONAL STUDIES:

## 2023-07-21 NOTE — PROGRESS NOTE ADULT - ASSESSMENT
78M POD24 laparoscopic lysis of adhesions, converted to open lysis of adhesions, SBR x 3, temporary abdominal closure, POD23 ex lap, removal of abthera, ileocolic resection, small bowel anastomosis c/b anastomotic leak resulting in wound dehiscence now POD16 RTOR exlap, washout, ileocolic resection with end ileostomy, blow hole colostomy, fistula, red rubber from ileostomy to small bowel anastomosis; vicryl bridging mesh; R JOYCE below ileostomy, L JOYCE at small bowel enterotomy repair.    Continue monitoring LFTs  Vac change Monday, sooner if indicated  Appreciate excellent SICU care

## 2023-07-21 NOTE — PROGRESS NOTE ADULT - SUBJECTIVE AND OBJECTIVE BOX
Interval Events: No acute events overnight    Subjective Events: Patient seen and examined at bedside. Denies chest pain, shortness of breath, abdominal pain, n/v/d. Resting comfortably in the chair.       Allergies    penicillin (Angioedema)    Intolerances        Vital Signs Last 24 Hrs  T(C): 37.6 (21 Jul 2023 04:54), Max: 37.6 (21 Jul 2023 04:54)  T(F): 99.6 (21 Jul 2023 04:54), Max: 99.6 (21 Jul 2023 04:54)  HR: 72 (21 Jul 2023 07:00) (62 - 75)  BP: 125/82 (21 Jul 2023 07:00) (120/59 - 153/67)  BP(mean): 99 (21 Jul 2023 07:00) (83 - 107)  RR: 23 (21 Jul 2023 07:00) (20 - 44)  SpO2: 93% (21 Jul 2023 07:00) (90% - 96%)    Parameters below as of 21 Jul 2023 07:00  Patient On (Oxygen Delivery Method): nasal cannula  O2 Flow (L/min): 2      07-20 @ 07:01  -  07-21 @ 07:00  --------------------------------------------------------  IN: 1993.2 mL / OUT: 3400 mL / NET: -1406.8 mL      07-20 @ 07:01  -  07-21 @ 07:00  --------------------------------------------------------  IN: 1993.2 mL / OUT: 3400 mL / NET: -1406.8 mL        Physical Exam:     Gen: No acute distress   Neuro: A&OX3 No deficits  CV: Regular rate and rhythm, S1/S2, no murmurs or gallops   Pulm: Lung sounds clear bilaterally   Abd: Soft NT ND + BS  Ext: Warm, well-perfused   Vasc: +2 pulses radially/pedal bilaterally   Skin: no rashes noted  MSK: No joint swelling  Psych: No signs of anxiety or depression      LABS:      CBC Full  -  ( 21 Jul 2023 06:07 )  WBC Count : 12.66 K/uL  RBC Count : 2.79 M/uL  Hemoglobin : 8.4 g/dL  Hematocrit : 26.2 %  Platelet Count - Automated : 344 K/uL  Mean Cell Volume : 93.9 fl  Mean Cell Hemoglobin : 30.1 pg  Mean Cell Hemoglobin Concentration : 32.1 gm/dL      07-21    133<L>  |  98  |  22  ----------------------------<  131<H>  4.2   |  23  |  0.97    Ca    7.3<L>      21 Jul 2023 06:07  Phos  3.2     07-21  Mg     2.2     07-21    TPro  7.7  /  Alb  2.2<L>  /  TBili  5.8<H>  /  DBili  4.2<H>  /  AST  148<H>  /  ALT  106<H>  /  AlkPhos  116  07-21          Urinalysis Basic - ( 21 Jul 2023 06:07 )    Color: x / Appearance: x / SG: x / pH: x  Gluc: 131 mg/dL / Ketone: x  / Bili: x / Urobili: x   Blood: x / Protein: x / Nitrite: x   Leuk Esterase: x / RBC: x / WBC x   Sq Epi: x / Non Sq Epi: x / Bacteria: x              RADIOLOGY & ADDITIONAL STUDIES (The following images were personally reviewed):      ACC: [id number] EXAM:  US DPLX ABDOMEN   ORDERED BY: KATERYNA JORGENSEN     PROCEDURE DATE:  07/20/2023          INTERPRETATION:  CLINICAL INFORMATION: Increased LFTs. Evaluate for liver   flow and acute cholecystitis.    COMPARISON: CT abdomen pelvis June 23, 2023.    TECHNIQUE: Color and spectral Doppler ultrasound of the portal venous   system, hepatic arteries, hepatic veins and IVC. Gallbladder was   evaluated as well.    FINDINGS:    Portal Veins: The main portal vein is patent with phasic hepatopetal   flow. The main portal vein measures 1.3 cm and the velocity is 32.1 cm/s.  The right portal vein and branches are patent and demonstrate phasic   hepatopetal flow.  The left portal vein and branches are patent and demonstrates phasic   hepatopetal flow.    Hepatic Artery: Hepatic artery peak systolic velocity is 200.7 cm/s,   without abnormal waveforms.    Hepatic Veins: The right, mid and left hepatic veins are patent and   demonstrate phasic flow. The intrahepatic IVC is patent with phasic flow.    Liver: 19.3 cm, mildly enlarged. Normal echogenicity. Smooth contour.    Gallbladder: Distended with intraluminal echogenic nonshadowing debris   consistent with sludge. No cholelithiasis. Mild wall thickening 3 to 4   mm. No pericholecystic fluid. No sonographic Capellan's sign.      IMPRESSION:  1.  No evidence of portal or hepatic venous thrombosis.  2.  Fluid and sludge distended gallbladder. No cholelithiasis or evidence   of of cholecystitis.    --- End of Report ---           Interval Events: No acute events overnight    Subjective Events: Patient seen and examined at bedside. Denies chest pain, shortness of breath, abdominal pain, n/v/d. Resting comfortably in the chair.       Allergies    penicillin (Angioedema)    Intolerances        Vital Signs Last 24 Hrs  T(C): 37.6 (21 Jul 2023 04:54), Max: 37.6 (21 Jul 2023 04:54)  T(F): 99.6 (21 Jul 2023 04:54), Max: 99.6 (21 Jul 2023 04:54)  HR: 72 (21 Jul 2023 07:00) (62 - 75)  BP: 125/82 (21 Jul 2023 07:00) (120/59 - 153/67)  BP(mean): 99 (21 Jul 2023 07:00) (83 - 107)  RR: 23 (21 Jul 2023 07:00) (20 - 44)  SpO2: 93% (21 Jul 2023 07:00) (90% - 96%)    Parameters below as of 21 Jul 2023 07:00  Patient On (Oxygen Delivery Method): nasal cannula  O2 Flow (L/min): 2      07-20 @ 07:01  -  07-21 @ 07:00  --------------------------------------------------------  IN: 1993.2 mL / OUT: 3400 mL / NET: -1406.8 mL      07-20 @ 07:01  -  07-21 @ 07:00  --------------------------------------------------------  IN: 1993.2 mL / OUT: 3400 mL / NET: -1406.8 mL        Physical Exam:     Gen: No acute distress   Neuro: A&OX3 No deficits  CV: Regular rate and rhythm, S1/S2, no murmurs or gallops   Pulm: Lung sounds clear bilaterally   Abd: Soft NT ND, wound vac in place   Ext: Warm, well-perfused   Vasc: +2 pulses radially/pedal bilaterally   Skin: no rashes noted  MSK: No joint swelling  Psych: No signs of anxiety or depression      LABS:      CBC Full  -  ( 21 Jul 2023 06:07 )  WBC Count : 12.66 K/uL  RBC Count : 2.79 M/uL  Hemoglobin : 8.4 g/dL  Hematocrit : 26.2 %  Platelet Count - Automated : 344 K/uL  Mean Cell Volume : 93.9 fl  Mean Cell Hemoglobin : 30.1 pg  Mean Cell Hemoglobin Concentration : 32.1 gm/dL      07-21    133<L>  |  98  |  22  ----------------------------<  131<H>  4.2   |  23  |  0.97    Ca    7.3<L>      21 Jul 2023 06:07  Phos  3.2     07-21  Mg     2.2     07-21    TPro  7.7  /  Alb  2.2<L>  /  TBili  5.8<H>  /  DBili  4.2<H>  /  AST  148<H>  /  ALT  106<H>  /  AlkPhos  116  07-21          Urinalysis Basic - ( 21 Jul 2023 06:07 )    Color: x / Appearance: x / SG: x / pH: x  Gluc: 131 mg/dL / Ketone: x  / Bili: x / Urobili: x   Blood: x / Protein: x / Nitrite: x   Leuk Esterase: x / RBC: x / WBC x   Sq Epi: x / Non Sq Epi: x / Bacteria: x              RADIOLOGY & ADDITIONAL STUDIES (The following images were personally reviewed):      ACC: [id number] EXAM:  US DPLX ABDOMEN   ORDERED BY: KATERYNA JORGENSEN     PROCEDURE DATE:  07/20/2023          INTERPRETATION:  CLINICAL INFORMATION: Increased LFTs. Evaluate for liver   flow and acute cholecystitis.    COMPARISON: CT abdomen pelvis June 23, 2023.    TECHNIQUE: Color and spectral Doppler ultrasound of the portal venous   system, hepatic arteries, hepatic veins and IVC. Gallbladder was   evaluated as well.    FINDINGS:    Portal Veins: The main portal vein is patent with phasic hepatopetal   flow. The main portal vein measures 1.3 cm and the velocity is 32.1 cm/s.  The right portal vein and branches are patent and demonstrate phasic   hepatopetal flow.  The left portal vein and branches are patent and demonstrates phasic   hepatopetal flow.    Hepatic Artery: Hepatic artery peak systolic velocity is 200.7 cm/s,   without abnormal waveforms.    Hepatic Veins: The right, mid and left hepatic veins are patent and   demonstrate phasic flow. The intrahepatic IVC is patent with phasic flow.    Liver: 19.3 cm, mildly enlarged. Normal echogenicity. Smooth contour.    Gallbladder: Distended with intraluminal echogenic nonshadowing debris   consistent with sludge. No cholelithiasis. Mild wall thickening 3 to 4   mm. No pericholecystic fluid. No sonographic Capellan's sign.      IMPRESSION:  1.  No evidence of portal or hepatic venous thrombosis.  2.  Fluid and sludge distended gallbladder. No cholelithiasis or evidence   of of cholecystitis.    --- End of Report ---

## 2023-07-21 NOTE — PROGRESS NOTE ADULT - NS ATTEND AMEND GEN_ALL_CORE FT
AF, Crohn's s/p SBR c/b anastomotic leak now s/p ileocolic resection, ileostomy   physical as above  continue vac  bilirubin stable and no sign of obstruction on U/S suggests cholestasis  continue amiodarone and metoprolol  follow off abx  TPN written  decision making of high complexity

## 2023-07-21 NOTE — PROGRESS NOTE ADULT - SUBJECTIVE AND OBJECTIVE BOX
Pt seen and examined   no complaints  tolerating diet  out of bed to chair    REVIEW OF SYSTEMS:  Constitutional: No fever,  Cardiovascular: No chest pain, palpitations, dizziness   Gastrointestinal: No abdominal or epigastric pain. No nausea, vomiting or hematemesis; No diarrhea   Skin: No itching, burning, rashes or lesions       MEDICATIONS:  MEDICATIONS  (STANDING):  aMIOdarone    Tablet 200 milliGRAM(s) Oral daily  amLODIPine   Tablet 5 milliGRAM(s) Oral daily  atorvastatin 20 milliGRAM(s) Oral at bedtime  chlorhexidine 2% Cloths 1 Application(s) Topical <User Schedule>  cholestyramine Powder (Sugar-Free) 4 Gram(s) Oral two times a day  dextrose 5%. 1000 milliLiter(s) (50 mL/Hr) IV Continuous <Continuous>  dextrose 5%. 1000 milliLiter(s) (100 mL/Hr) IV Continuous <Continuous>  dextrose 50% Injectable 25 Gram(s) IV Push once  dextrose 50% Injectable 12.5 Gram(s) IV Push once  dextrose 50% Injectable 25 Gram(s) IV Push once  glucagon  Injectable 1 milliGRAM(s) IntraMuscular once  heparin   Injectable 5000 Unit(s) SubCutaneous every 8 hours  insulin lispro (ADMELOG) corrective regimen sliding scale   SubCutaneous every 6 hours  levothyroxine 50 MICROGram(s) Oral daily  lipid, fat emulsion (Fish Oil and Plant Based) 20% Infusion 0.5 Gm/kG/Day (20.83 mL/Hr) IV Continuous <Continuous>  losartan 25 milliGRAM(s) Oral daily  metoprolol tartrate 25 milliGRAM(s) Oral every 12 hours  octreotide  Injectable 100 MICROGram(s) IV Push every 8 hours  pantoprazole    Tablet 40 milliGRAM(s) Oral before breakfast  Parenteral Nutrition - Adult 1 Each (85 mL/Hr) TPN Continuous <Continuous>  venlafaxine XR. 300 milliGRAM(s) Oral daily    MEDICATIONS  (PRN):  acetaminophen     Tablet .. 650 milliGRAM(s) Oral every 6 hours PRN Mild Pain (1 - 3)  benzocaine/menthol Lozenge 2 Lozenge Oral every 4 hours PRN Sore Throat  dextrose Oral Gel 15 Gram(s) Oral once PRN Blood Glucose LESS THAN 70 milliGRAM(s)/deciliter  hydrALAZINE Injectable 10 milliGRAM(s) IV Push every 6 hours PRN SBP > 140  melatonin 5 milliGRAM(s) Oral at bedtime PRN Sleep      Allergies    penicillin (Angioedema)    Intolerances        Vital Signs Last 24 Hrs  T(C): 37.3 (21 Jul 2023 09:22), Max: 37.6 (21 Jul 2023 04:54)  T(F): 99.2 (21 Jul 2023 09:22), Max: 99.6 (21 Jul 2023 04:54)  HR: 72 (21 Jul 2023 10:00) (62 - 75)  BP: 126/57 (21 Jul 2023 10:00) (122/63 - 153/67)  BP(mean): 85 (21 Jul 2023 10:00) (83 - 107)  RR: 32 (21 Jul 2023 10:00) (20 - 44)  SpO2: 95% (21 Jul 2023 10:00) (90% - 96%)    Parameters below as of 21 Jul 2023 10:00  Patient On (Oxygen Delivery Method): nasal cannula  O2 Flow (L/min): 2      07-20 @ 07:01 - 07-21 @ 07:00  --------------------------------------------------------  IN: 1993.2 mL / OUT: 3400 mL / NET: -1406.8 mL    07-21 @ 07:01  -  07-21 @ 10:34  --------------------------------------------------------  IN: 375 mL / OUT: 100 mL / NET: 275 mL        PHYSICAL EXAM:    General:  in no acute distress  HEENT: MMM, conjunctiva and sclera clear  Gastrointestinal: Soft  non-distended; Normal bowel sounds; ileostomy viable, JOYCE on left  Skin: Warm and dry. No obvious rash    LABS:      CBC Full  -  ( 21 Jul 2023 06:07 )  WBC Count : 12.66 K/uL  RBC Count : 2.79 M/uL  Hemoglobin : 8.4 g/dL  Hematocrit : 26.2 %  Platelet Count - Automated : 344 K/uL  Mean Cell Volume : 93.9 fl  Mean Cell Hemoglobin : 30.1 pg  Mean Cell Hemoglobin Concentration : 32.1 gm/dL  Auto Neutrophil # : x  Auto Lymphocyte # : x  Auto Monocyte # : x  Auto Eosinophil # : x  Auto Basophil # : x  Auto Neutrophil % : x  Auto Lymphocyte % : x  Auto Monocyte % : x  Auto Eosinophil % : x  Auto Basophil % : x    07-21    133<L>  |  98  |  22  ----------------------------<  131<H>  4.2   |  23  |  0.97    Ca    7.3<L>      21 Jul 2023 06:07  Phos  3.2     07-21  Mg     2.2     07-21    TPro  7.7  /  Alb  2.2<L>  /  TBili  5.8<H>  /  DBili  4.2<H>  /  AST  148<H>  /  ALT  106<H>  /  AlkPhos  116  07-21          Urinalysis Basic - ( 21 Jul 2023 06:07 )    Color: x / Appearance: x / SG: x / pH: x  Gluc: 131 mg/dL / Ketone: x  / Bili: x / Urobili: x   Blood: x / Protein: x / Nitrite: x   Leuk Esterase: x / RBC: x / WBC x   Sq Epi: x / Non Sq Epi: x / Bacteria: x                RADIOLOGY & ADDITIONAL STUDIES (The following images were personally reviewed):

## 2023-07-21 NOTE — PROGRESS NOTE ADULT - SUBJECTIVE AND OBJECTIVE BOX
wound manager holding  AVSS  Abd soft  elytes OK  H&H stable  WBC elevated  LFTs stable  US shows no biliary obstruction    TPN  Clears  PT  Wound care

## 2023-07-22 NOTE — PROGRESS NOTE ADULT - ASSESSMENT
78M POD25 laparoscopic lysis of adhesions, converted to open lysis of adhesions, SBR x 3, temporary abdominal closure, POD24 ex lap, removal of abthera, ileocolic resection, small bowel anastomosis c/b anastomotic leak resulting in wound dehiscence now POD17 RTOR exlap, washout, ileocolic resection with end ileostomy, blow hole colostomy, fistula, red rubber from ileostomy to small bowel anastomosis; vicryl bridging mesh; R JOYCE below ileostomy, L JOYCE at small bowel enterotomy repair.    Recommendations:  Appreciate Care per primary  Vac recently changed yesterday, plan Monday change     78M POD25 laparoscopic lysis of adhesions, converted to open lysis of adhesions, SBR x 3, temporary abdominal closure, POD24 ex lap, removal of abthera, ileocolic resection, small bowel anastomosis c/b anastomotic leak resulting in wound dehiscence now POD17 RTOR exlap, washout, ileocolic resection with end ileostomy, blow hole colostomy, fistula, red rubber from ileostomy to small bowel anastomosis; vicryl bridging mesh; R JOYCE below ileostomy, L JOYCE at small bowel enterotomy repair.    Recommendations:  Appreciate Care per primary  Vac recently changed yesterday, plan Monday change 7/24

## 2023-07-22 NOTE — PROGRESS NOTE ADULT - SUBJECTIVE AND OBJECTIVE BOX
Pt seen and examined at bedside in the SICU. Attending coverage for Dr. Peterson.  78M with hx of Crohn's now POD16 from his end ileostomy and colostomy creation with in lay mesh abdominal closure.  Clinically pt remains afebrile with normal vital signs and denies significant abdominal pain or nausea.   Abd is soft, nondistended without significant tenderness, wound care manager in place and functioning.  Increasing leukocytosis from 12 to 16, and stable bilirubinemia without obstructive radiographic imaging, suggestive of new abdominal bile fluid collection possibly needing drainage.  Will trend CBC and if WBC continues to worsen will consider repeat imaging with CT.

## 2023-07-22 NOTE — PROGRESS NOTE ADULT - ASSESSMENT
78M w/ Crohn's, AFib/Flutter s/p DCCVs on amiodarone, remote ileocectomy and open appy here for SBO vs Crohns flare, s/p NGT decompression and now s/p lap TRE converted to open TRE, SBR x 3, left in discontinuity with abthera vac on 6/27, RTOR for ileocolic resection, small bowel anastomosis, and abdominal wall closure on 6/28, and s/p IR aspiration of perihepatic fluid on 7/3, stepped down to telemetery on 7/4. wound dehisence on 7/5, RTOR exlap, washout, ileocolic resection with end ileostomy, blow hole colostomy, red rubber from ileostomy to small bowel anastomosis; vicryl bridging mesh on 7/5. Transferred to SICU post op for HD monitoring. Extubated 7/6.    Neuro: Tylenol PRN for pain. Hx anxiety: Continue venlafaxine  CV: MAP > 65. Hx of Afib/flutter: s/p DCCV, on PO amiodarone, metoprolol. Hx of HTN- continue home losartan and continue hydralazine. Starting amlodipine 5 daily. IV PRN for SBP < 140. Hx of HLD: continue home rosuvastatin; Repeat TTE  (07/18) - PASP 64mmHg, EF 65-70%%. Appears euvolemic - no diuresis today  Pulm: Extubated 7/6 - now on RA/NC saturating well. s/p Chest tube by CT surg (6/27--7/3)   GI: CLD. TPN/lipids, wound dehisense -- RTOR on 7/5 for exlap, washout, ileocolic resection with end ileostomy, blow hole colostomy; vicryl bridging mesh explanted today and vac replaced - Continue Octreotide and cholestyramine. T bili elevated today with scleral icterus - RUQ duplex today.  : Voids.   ID: Not currently on antibiotics. Perforated viscus, IR Cx 7/3: C. tertium, Lactobacillis. OR Cx 7/5 - rare yeast - Imipenem (6/30-7/12), Fluconazole (6/30 -7/12) OR Cx 6/28: Vanc-resistant/amp-sens (but allergic) enterococcus s/p CTX (6/26-6/30), Flagyl (6/26-6/30), Dapto (6/30-7/5).  Endo: ISS; Hx of hypothyroid: cont synthroid IV. Gout: holding home allopurionol  PPx: SCD, HSQ. LE duplex 7/13 neg.  Lines: PIV x 2, RUE PICC (6/30--), [place] (7/8-7/10) // DC: R Chest tube for iatrogenic PTX (6/27--7/3), R TLC (06/26-30). R TLC (7/5 -7/12)  Wounds/Drains: midline incision; wound vac changes q48h with fistula opening; R JOYCE below ileostomy (D/C 7/17), L JOYCE at small bowel enterotomy repair.   PT/OT: OOB ambulating in castrejon   Dispo: SICU

## 2023-07-22 NOTE — PROGRESS NOTE ADULT - SUBJECTIVE AND OBJECTIVE BOX
Pt seen and examined   WBC up to 16K today  no complaints    REVIEW OF SYSTEMS:  Constitutional: No fever,   Cardiovascular: No chest pain, palpitations,  Gastrointestinal: No abdominal or epigastric pain. No nausea, vomiting   Skin: No itching, burning, rashes or lesions       MEDICATIONS:  MEDICATIONS  (STANDING):  aMIOdarone    Tablet 200 milliGRAM(s) Oral daily  amLODIPine   Tablet 5 milliGRAM(s) Oral daily  atorvastatin 20 milliGRAM(s) Oral at bedtime  chlorhexidine 2% Cloths 1 Application(s) Topical <User Schedule>  cholestyramine Powder (Sugar-Free) 4 Gram(s) Oral two times a day  dextrose 5%. 1000 milliLiter(s) (50 mL/Hr) IV Continuous <Continuous>  dextrose 5%. 1000 milliLiter(s) (100 mL/Hr) IV Continuous <Continuous>  dextrose 50% Injectable 25 Gram(s) IV Push once  dextrose 50% Injectable 12.5 Gram(s) IV Push once  dextrose 50% Injectable 25 Gram(s) IV Push once  glucagon  Injectable 1 milliGRAM(s) IntraMuscular once  heparin   Injectable 5000 Unit(s) SubCutaneous every 8 hours  insulin lispro (ADMELOG) corrective regimen sliding scale   SubCutaneous every 6 hours  levothyroxine 50 MICROGram(s) Oral daily  lipid, fat emulsion (Fish Oil and Plant Based) 20% Infusion 0.5 Gm/kG/Day (20.83 mL/Hr) IV Continuous <Continuous>  losartan 25 milliGRAM(s) Oral daily  metoprolol tartrate 25 milliGRAM(s) Oral every 12 hours  octreotide  Injectable 100 MICROGram(s) IV Push every 8 hours  pantoprazole    Tablet 40 milliGRAM(s) Oral before breakfast  Parenteral Nutrition - Adult 1 Each (85 mL/Hr) TPN Continuous <Continuous>  Parenteral Nutrition - Adult 1 Each (85 mL/Hr) TPN Continuous <Continuous>  venlafaxine XR. 300 milliGRAM(s) Oral daily    MEDICATIONS  (PRN):  acetaminophen     Tablet .. 650 milliGRAM(s) Oral every 6 hours PRN Mild Pain (1 - 3)  benzocaine/menthol Lozenge 2 Lozenge Oral every 4 hours PRN Sore Throat  dextrose Oral Gel 15 Gram(s) Oral once PRN Blood Glucose LESS THAN 70 milliGRAM(s)/deciliter  hydrALAZINE Injectable 10 milliGRAM(s) IV Push every 6 hours PRN SBP > 140  melatonin 5 milliGRAM(s) Oral at bedtime PRN Sleep      Allergies    penicillin (Angioedema)    Intolerances        Vital Signs Last 24 Hrs  T(C): 37.4 (22 Jul 2023 09:00), Max: 37.4 (22 Jul 2023 09:00)  T(F): 99.3 (22 Jul 2023 09:00), Max: 99.3 (22 Jul 2023 09:00)  HR: 79 (22 Jul 2023 13:00) (65 - 81)  BP: 156/74 (22 Jul 2023 13:00) (129/81 - 176/77)  BP(mean): 106 (22 Jul 2023 13:00) (92 - 130)  RR: 28 (22 Jul 2023 13:00) (20 - 42)  SpO2: 95% (22 Jul 2023 13:00) (91% - 96%)    Parameters below as of 22 Jul 2023 13:00  Patient On (Oxygen Delivery Method): room air        07-21 @ 07:01 - 07-22 @ 07:00  --------------------------------------------------------  IN: 2738 mL / OUT: 2275 mL / NET: 463 mL    07-22 @ 07:01  -  07-22 @ 13:40  --------------------------------------------------------  IN: 955 mL / OUT: 850 mL / NET: 105 mL        PHYSICAL EXAM:    General:  in no acute distress  HEENT: MMM, conjunctiva and sclera clear  Gastrointestinal: Soft   non-distended; Normal bowel sounds; ileostomy scant output, left drain greenish, dressings vac  Skin: Warm and dry. No obvious rash    LABS:      CBC Full  -  ( 22 Jul 2023 05:39 )  WBC Count : 16.33 K/uL  RBC Count : 2.92 M/uL  Hemoglobin : 8.9 g/dL  Hematocrit : 27.5 %  Platelet Count - Automated : 336 K/uL  Mean Cell Volume : 94.2 fl  Mean Cell Hemoglobin : 30.5 pg  Mean Cell Hemoglobin Concentration : 32.4 gm/dL  Auto Neutrophil # : x  Auto Lymphocyte # : x  Auto Monocyte # : x  Auto Eosinophil # : x  Auto Basophil # : x  Auto Neutrophil % : x  Auto Lymphocyte % : x  Auto Monocyte % : x  Auto Eosinophil % : x  Auto Basophil % : x    07-22    133<L>  |  102  |  21  ----------------------------<  138<H>  4.5   |  24  |  0.91    Ca    7.4<L>      22 Jul 2023 05:39  Phos  2.9     07-22  Mg     2.2     07-22    TPro  7.7  /  Alb  2.3<L>  /  TBili  6.1<H>  /  DBili  4.7<H>  /  AST  143<H>  /  ALT  119<H>  /  AlkPhos  124<H>  07-22          Urinalysis Basic - ( 22 Jul 2023 05:39 )    Color: x / Appearance: x / SG: x / pH: x  Gluc: 138 mg/dL / Ketone: x  / Bili: x / Urobili: x   Blood: x / Protein: x / Nitrite: x   Leuk Esterase: x / RBC: x / WBC x   Sq Epi: x / Non Sq Epi: x / Bacteria: x                RADIOLOGY & ADDITIONAL STUDIES (The following images were personally reviewed):

## 2023-07-22 NOTE — PROGRESS NOTE ADULT - ASSESSMENT
wbc up  consider CT chest abdomen and pelvis to see if there are any infection  diet and wound care per surgical team

## 2023-07-22 NOTE — PROGRESS NOTE ADULT - SUBJECTIVE AND OBJECTIVE BOX
Interval Events:  Patient seen and examined at bedside.      Allergies    penicillin (Angioedema)    Intolerances        Vital Signs Last 24 Hrs  T(C): 37.4 (22 Jul 2023 09:00), Max: 37.4 (22 Jul 2023 09:00)  T(F): 99.3 (22 Jul 2023 09:00), Max: 99.3 (22 Jul 2023 09:00)  HR: 71 (22 Jul 2023 10:00) (65 - 81)  BP: 165/76 (22 Jul 2023 10:00) (129/62 - 176/77)  BP(mean): 109 (22 Jul 2023 10:00) (85 - 130)  RR: 31 (22 Jul 2023 10:00) (20 - 42)  SpO2: 92% (22 Jul 2023 10:00) (91% - 97%)    Parameters below as of 22 Jul 2023 10:00  Patient On (Oxygen Delivery Method): room air        07-21 @ 07:01  -  07-22 @ 07:00  --------------------------------------------------------  IN: 2738 mL / OUT: 2275 mL / NET: 463 mL    07-22 @ 07:01  -  07-22 @ 10:09  --------------------------------------------------------  IN: 170 mL / OUT: 450 mL / NET: -280 mL      07-21 @ 07:01  -  07-22 @ 07:00  --------------------------------------------------------  IN: 2738 mL / OUT: 2275 mL / NET: 463 mL    07-22 @ 07:01  -  07-22 @ 10:09  --------------------------------------------------------  IN: 170 mL / OUT: 450 mL / NET: -280 mL        Physical Exam:     Gen: NAD well nourished  Neuro: A&OX3 No deficits  CV:RRR Reg s1s2 noM  Pulm: CTA b/l No w/r/r  Abd: Soft NT ND + BS  Ext: No C/C/E   Vasc: + DP b/l   Skin: no rashes noted  MSK: No joint swelling  Psych: No signs of anxiety or depression      LABS:      CBC Full  -  ( 22 Jul 2023 05:39 )  WBC Count : 16.33 K/uL  RBC Count : 2.92 M/uL  Hemoglobin : 8.9 g/dL  Hematocrit : 27.5 %  Platelet Count - Automated : 336 K/uL  Mean Cell Volume : 94.2 fl  Mean Cell Hemoglobin : 30.5 pg  Mean Cell Hemoglobin Concentration : 32.4 gm/dL  Auto Neutrophil # : x  Auto Lymphocyte # : x  Auto Monocyte # : x  Auto Eosinophil # : x  Auto Basophil # : x  Auto Neutrophil % : x  Auto Lymphocyte % : x  Auto Monocyte % : x  Auto Eosinophil % : x  Auto Basophil % : x    07-22    133<L>  |  102  |  21  ----------------------------<  138<H>  4.5   |  24  |  0.91    Ca    7.4<L>      22 Jul 2023 05:39  Phos  2.9     07-22  Mg     2.2     07-22    TPro  7.7  /  Alb  2.3<L>  /  TBili  6.1<H>  /  DBili  4.7<H>  /  AST  143<H>  /  ALT  119<H>  /  AlkPhos  124<H>  07-22          Urinalysis Basic - ( 22 Jul 2023 05:39 )    Color: x / Appearance: x / SG: x / pH: x  Gluc: 138 mg/dL / Ketone: x  / Bili: x / Urobili: x   Blood: x / Protein: x / Nitrite: x   Leuk Esterase: x / RBC: x / WBC x   Sq Epi: x / Non Sq Epi: x / Bacteria: x              RADIOLOGY & ADDITIONAL STUDIES (The following images were personally reviewed):          A/p: 78yMale Interval Events:  No acute events overnight    Subjective Events: Patient seen and examined at bedside. No acute distress, resting comfortably in the chair. ROS negative.       Allergies    penicillin (Angioedema)    Intolerances        Vital Signs Last 24 Hrs  T(C): 37.4 (22 Jul 2023 09:00), Max: 37.4 (22 Jul 2023 09:00)  T(F): 99.3 (22 Jul 2023 09:00), Max: 99.3 (22 Jul 2023 09:00)  HR: 71 (22 Jul 2023 10:00) (65 - 81)  BP: 165/76 (22 Jul 2023 10:00) (129/62 - 176/77)  BP(mean): 109 (22 Jul 2023 10:00) (85 - 130)  RR: 31 (22 Jul 2023 10:00) (20 - 42)  SpO2: 92% (22 Jul 2023 10:00) (91% - 97%)    Parameters below as of 22 Jul 2023 10:00  Patient On (Oxygen Delivery Method): room air        07-21 @ 07:01  -  07-22 @ 07:00  --------------------------------------------------------  IN: 2738 mL / OUT: 2275 mL / NET: 463 mL    07-22 @ 07:01  -  07-22 @ 10:09  --------------------------------------------------------  IN: 170 mL / OUT: 450 mL / NET: -280 mL      07-21 @ 07:01  -  07-22 @ 07:00  --------------------------------------------------------  IN: 2738 mL / OUT: 2275 mL / NET: 463 mL    07-22 @ 07:01  -  07-22 @ 10:09  --------------------------------------------------------  IN: 170 mL / OUT: 450 mL / NET: -280 mL        Physical Exam:     Gen: No acute distress   Neuro: A&OX3 No deficits  CV: regular rate and rhythm, no murmurs or gallops, no peripheral edema   Pulm: Lung sounds clear bilaterally   Abd: Soft, mildly distended, wound vac in place   Ext: Warm, well-perfused    Vasc: +2 radial/pedal pulses bilaterally    Skin: abdominal dressing intact; no rashes noted  MSK: No joint swelling  Psych: No signs of anxiety or depression      LABS:      CBC Full  -  ( 22 Jul 2023 05:39 )  WBC Count : 16.33 K/uL  RBC Count : 2.92 M/uL  Hemoglobin : 8.9 g/dL  Hematocrit : 27.5 %  Platelet Count - Automated : 336 K/uL  Mean Cell Volume : 94.2 fl  Mean Cell Hemoglobin : 30.5 pg  Mean Cell Hemoglobin Concentration : 32.4 gm/dL      07-22    133<L>  |  102  |  21  ----------------------------<  138<H>  4.5   |  24  |  0.91    Ca    7.4<L>      22 Jul 2023 05:39  Phos  2.9     07-22  Mg     2.2     07-22    TPro  7.7  /  Alb  2.3<L>  /  TBili  6.1<H>  /  DBili  4.7<H>  /  AST  143<H>  /  ALT  119<H>  /  AlkPhos  124<H>  07-22          Urinalysis Basic - ( 22 Jul 2023 05:39 )    Color: x / Appearance: x / SG: x / pH: x  Gluc: 138 mg/dL / Ketone: x  / Bili: x / Urobili: x   Blood: x / Protein: x / Nitrite: x   Leuk Esterase: x / RBC: x / WBC x   Sq Epi: x / Non Sq Epi: x / Bacteria: x              RADIOLOGY & ADDITIONAL STUDIES (The following images were personally reviewed):      ACC: [id number] EXAM:  XR CHEST PORTABLE URGENT 1V   ORDERED BY: PAVEL RODRIGUEZ     PROCEDURE DATE:  07/22/2023          INTERPRETATION:  Clinical History: Leukocytosis    Frontal examination of the chest demonstrates the heart to be within   normal limits in transverse diameter. No interval change this remaining   support devices in comparison to prior examination of the chest   7/15/2023. 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

## 2023-07-22 NOTE — PROGRESS NOTE ADULT - ASSESSMENT
78M w/ Crohn's, AFib/Flutter s/p DCCVs on amiodarone, remote ileocectomy and open appy here for SBO vs Crohns flare, s/p NGT decompression and now s/p lap TRE converted to open TRE, SBR x 3, left in discontinuity with abthera vac on 6/27, RTOR for ileocolic resection, small bowel anastomosis, and abdominal wall closure on 6/28, and s/p IR aspiration of perihepatic fluid on 7/3, stepped down to telemetery on 7/4. wound dehisence on 7/5, RTOR exlap, washout, ileocolic resection with end ileostomy, blow hole colostomy, red rubber from ileostomy to small bowel anastomosis; vicryl bridging mesh on 7/5. Transferred to SICU post op for HD monitoring. Extubated 7/6.    Neuro: Tylenol PRN for pain. Hx anxiety: Continue venlafaxine  CV: MAP > 65. Hx of Afib/flutter: s/p DCCV, on PO amiodarone, metoprolol. Hx of HTN- continue home losartan and continue hydralazine. Starting amlodipine 5 daily. IV PRN for SBP < 140. Hx of HLD: continue home rosuvastatin; Repeat TTE  (07/18) - PASP 64mmHg, EF 65-70%%. Appears euvolemic - no diuresis today  Pulm: Extubated 7/6 - now on RA/NC saturating well. s/p Chest tube by CT surg (6/27--7/3)   GI: CLD. TPN/lipids, wound dehisense -- RTOR on 7/5 for exlap, washout, ileocolic resection with end ileostomy, blow hole colostomy; vicryl bridging mesh explanted today and vac replaced - Continue Octreotide and cholestyramine. T bili elevated today with scleral icterus - RUQ duplex today.  : Voids.   ID: Not currently on antibiotics. Perforated viscus, IR Cx 7/3: C. tertium, Lactobacillis. OR Cx 7/5 - rare yeast - Imipenem (6/30-7/12), Fluconazole (6/30 -7/12) OR Cx 6/28: Vanc-resistant/amp-sens (but allergic) enterococcus s/p CTX (6/26-6/30), Flagyl (6/26-6/30), Dapto (6/30-7/5).  Endo: ISS; Hx of hypothyroid: cont synthroid IV. Gout: holding home allopurionol  PPx: SCD, HSQ. LE duplex 7/13 neg.  Lines: PIV x 2, RUE PICC (6/30--), [place] (7/8-7/10) // DC: R Chest tube for iatrogenic PTX (6/27--7/3), R TLC (06/26-30). R TLC (7/5 -7/12)  Wounds/Drains: midline incision; wound vac changes q48h with fistula opening; R JOYCE below ileostomy (D/C 7/17), L JOYCE at small bowel enterotomy repair.   PT/OT: OOB ambulating in castrejon   Dispo: SICU   78M w/ Crohn's, AFib/Flutter s/p DCCVs on amiodarone, remote ileocectomy and open appy here for SBO vs Crohns flare, s/p NGT decompression and now s/p lap TRE converted to open TRE, SBR x 3, left in discontinuity with abthera vac on 6/27, RTOR for ileocolic resection, small bowel anastomosis, and abdominal wall closure on 6/28, and s/p IR aspiration of perihepatic fluid on 7/3, stepped down to telemetery on 7/4. wound dehisence on 7/5, RTOR exlap, washout, ileocolic resection with end ileostomy, blow hole colostomy, red rubber from ileostomy to small bowel anastomosis; vicryl bridging mesh on 7/5. Transferred to SICU post op for HD monitoring. Extubated 7/6.    Neuro: Tylenol PRN for pain. Hx anxiety: Continue venlafaxine  CV: MAP > 65. Hx of Afib/flutter: s/p DCCV, on PO amiodarone, metoprolol. Hx of HTN- continue home losartan and continue hydralazine. Starting amlodipine 5 daily. IV PRN for SBP < 140. Hx of HLD: continue home rosuvastatin; Repeat TTE  (07/18) - PASP 64mmHg, EF 65-70%%. Appears euvolemic - no diuresis today  Pulm: Extubated 7/6 - now on RA/NC saturating well. Chest x-ray today for leukocytosis: atelectasis versus left pleural effusion. s/p Chest tube by CT surg (6/27--7/3)   GI: CLD. TPN/lipids, wound dehisense -- RTOR on 7/5 for exlap, washout, ileocolic resection with end ileostomy, blow hole colostomy; vicryl bridging mesh explanted today and vac replaced - Continue Octreotide and cholestyramine. T bili continues to be elevated. Abdominal u/s negative for cholelithiasis/cholecystitis.   : Voids.   ID: Not currently on antibiotics. Afebrile. Will continue to reevaluate. Perforated viscus, IR Cx 7/3: C. tertium, Lactobacillis. OR Cx 7/5 - rare yeast - Imipenem (6/30-7/12), Fluconazole (6/30 -7/12) OR Cx 6/28: Vanc-resistant/amp-sens (but allergic) enterococcus s/p CTX (6/26-6/30), Flagyl (6/26-6/30), Dapto (6/30-7/5).  Endo: ISS; Hx of hypothyroid: cont synthroid IV. Gout: holding home allopurionol  PPx: SCD, HSQ. LE duplex 7/13 neg.  Lines: PIV x 2, RUE PICC (6/30--), [place] (7/8-7/10) // DC: R Chest tube for iatrogenic PTX (6/27--7/3), R TLC (06/26-30). R TLC (7/5 -7/12)  Wounds/Drains: midline incision; wound vac changes q48h with fistula opening; R JOYCE below ileostomy (D/C 7/17), L JYOCE at small bowel enterotomy repair.   PT/OT: OOB ambulating in castrejon   Dispo: SICU

## 2023-07-22 NOTE — PROGRESS NOTE ADULT - SUBJECTIVE AND OBJECTIVE BOX
ON:       SUBJECTIVE: Pt seen and examined at bedside this am by ICU team. Patient is lying comfortably in bed with no complaints. Tolerating diet, pain well controlled with current regimen. Patient denies fever, nausea, vomiting, chest pain, and shortness of breath. Patient is passing gas and having bowel movements.       MEDICATIONS  (STANDING):  aMIOdarone    Tablet 200 milliGRAM(s) Oral daily  amLODIPine   Tablet 5 milliGRAM(s) Oral daily  atorvastatin 20 milliGRAM(s) Oral at bedtime  chlorhexidine 2% Cloths 1 Application(s) Topical <User Schedule>  cholestyramine Powder (Sugar-Free) 4 Gram(s) Oral two times a day  dextrose 5%. 1000 milliLiter(s) (50 mL/Hr) IV Continuous <Continuous>  dextrose 5%. 1000 milliLiter(s) (100 mL/Hr) IV Continuous <Continuous>  dextrose 50% Injectable 25 Gram(s) IV Push once  dextrose 50% Injectable 12.5 Gram(s) IV Push once  dextrose 50% Injectable 25 Gram(s) IV Push once  glucagon  Injectable 1 milliGRAM(s) IntraMuscular once  heparin   Injectable 5000 Unit(s) SubCutaneous every 8 hours  insulin lispro (ADMELOG) corrective regimen sliding scale   SubCutaneous every 6 hours  levothyroxine 50 MICROGram(s) Oral daily  lipid, fat emulsion (Fish Oil and Plant Based) 20% Infusion 0.5 Gm/kG/Day (21 mL/Hr) IV Continuous <Continuous>  losartan 25 milliGRAM(s) Oral daily  metoprolol tartrate 25 milliGRAM(s) Oral every 12 hours  octreotide  Injectable 100 MICROGram(s) IV Push every 8 hours  pantoprazole    Tablet 40 milliGRAM(s) Oral before breakfast  Parenteral Nutrition - Adult 1 Each (85 mL/Hr) TPN Continuous <Continuous>  venlafaxine XR. 300 milliGRAM(s) Oral daily    MEDICATIONS  (PRN):  acetaminophen     Tablet .. 650 milliGRAM(s) Oral every 6 hours PRN Mild Pain (1 - 3)  benzocaine/menthol Lozenge 2 Lozenge Oral every 4 hours PRN Sore Throat  dextrose Oral Gel 15 Gram(s) Oral once PRN Blood Glucose LESS THAN 70 milliGRAM(s)/deciliter  hydrALAZINE Injectable 10 milliGRAM(s) IV Push every 6 hours PRN SBP > 140  melatonin 5 milliGRAM(s) Oral at bedtime PRN Sleep      Drips:     ICU Vital Signs Last 24 Hrs  T(C): 36.8 (21 Jul 2023 21:55), Max: 37.3 (21 Jul 2023 09:22)  T(F): 98.3 (21 Jul 2023 21:55), Max: 99.2 (21 Jul 2023 09:22)  HR: 79 (22 Jul 2023 05:00) (65 - 81)  BP: 173/75 (22 Jul 2023 05:00) (125/82 - 173/75)  BP(mean): 108 (22 Jul 2023 05:00) (85 - 130)  ABP: --  ABP(mean): --  RR: 31 (22 Jul 2023 05:00) (20 - 48)  SpO2: 95% (22 Jul 2023 05:00) (90% - 97%)    O2 Parameters below as of 22 Jul 2023 05:00  Patient On (Oxygen Delivery Method): nasal cannula  O2 Flow (L/min): 2          Physical Exam:  General: NAD  HEENT: NC/AT, EOMI, PERRLA, normal hearing, no oral lesions, neck supple w/o LAD  Pulmonary: Nonlabored breathing, no respiratory distress, CTA-B  Cardiovascular: NSR, no murmurs  Abdominal: soft, NT/ND, +BS, no organomegaly  Extremities: WWP, 5/5 strength x 4, no clubbing/cyanosis/edema  Neuro: A/O x3, CNs II-XII grossly intact, normal motor/sensation, no focal deficits  Pulses: palpable distal pulses    Lines/tubes/drains:  Poole:	      Love settings:      I&O's Summary    20 Jul 2023 07:01  -  21 Jul 2023 07:00  --------------------------------------------------------  IN: 1993.2 mL / OUT: 3400 mL / NET: -1406.8 mL    21 Jul 2023 07:01  -  22 Jul 2023 05:50  --------------------------------------------------------  IN: 2547 mL / OUT: 1750 mL / NET: 797 mL        LABS:                        8.9    16.33 )-----------( 336      ( 22 Jul 2023 05:39 )             27.5     07-21    133<L>  |  98  |  22  ----------------------------<  131<H>  4.2   |  23  |  0.97    Ca    7.3<L>      21 Jul 2023 06:07  Phos  3.2     07-21  Mg     2.2     07-21    TPro  7.7  /  Alb  2.2<L>  /  TBili  5.8<H>  /  DBili  4.2<H>  /  AST  148<H>  /  ALT  106<H>  /  AlkPhos  116  07-21      Urinalysis Basic - ( 21 Jul 2023 06:07 )    Color: x / Appearance: x / SG: x / pH: x  Gluc: 131 mg/dL / Ketone: x  / Bili: x / Urobili: x   Blood: x / Protein: x / Nitrite: x   Leuk Esterase: x / RBC: x / WBC x   Sq Epi: x / Non Sq Epi: x / Bacteria: x      CAPILLARY BLOOD GLUCOSE      POCT Blood Glucose.: 131 mg/dL (22 Jul 2023 05:28)  POCT Blood Glucose.: 133 mg/dL (22 Jul 2023 00:29)  POCT Blood Glucose.: 124 mg/dL (21 Jul 2023 17:04)  POCT Blood Glucose.: 130 mg/dL (21 Jul 2023 11:07)    LIVER FUNCTIONS - ( 21 Jul 2023 06:07 )  Alb: 2.2 g/dL / Pro: 7.7 g/dL / ALK PHOS: 116 U/L / ALT: 106 U/L / AST: 148 U/L / GGT: x             Cultures:    RADIOLOGY & ADDITIONAL STUDIES:     7/22: AMRITA  ON: JOY[facility] back to [facility]. -170 - extra 10mg hydralazine given.     SUBJECTIVE: Pt seen and examined in chair this am by surgery team. Patient is lying comfortably in bed with no complaints. Tolerating diet, pain well controlled with current regimen. Patient denies fever, nausea, vomiting, chest pain, and shortness of breath.      MEDICATIONS  (STANDING):  aMIOdarone    Tablet 200 milliGRAM(s) Oral daily  amLODIPine   Tablet 5 milliGRAM(s) Oral daily  atorvastatin 20 milliGRAM(s) Oral at bedtime  chlorhexidine 2% Cloths 1 Application(s) Topical <User Schedule>  cholestyramine Powder (Sugar-Free) 4 Gram(s) Oral two times a day  dextrose 5%. 1000 milliLiter(s) (50 mL/Hr) IV Continuous <Continuous>  dextrose 5%. 1000 milliLiter(s) (100 mL/Hr) IV Continuous <Continuous>  dextrose 50% Injectable 25 Gram(s) IV Push once  dextrose 50% Injectable 12.5 Gram(s) IV Push once  dextrose 50% Injectable 25 Gram(s) IV Push once  glucagon  Injectable 1 milliGRAM(s) IntraMuscular once  heparin   Injectable 5000 Unit(s) SubCutaneous every 8 hours  insulin lispro (ADMELOG) corrective regimen sliding scale   SubCutaneous every 6 hours  levothyroxine 50 MICROGram(s) Oral daily  lipid, fat emulsion (Fish Oil and Plant Based) 20% Infusion 0.5 Gm/kG/Day (21 mL/Hr) IV Continuous <Continuous>  losartan 25 milliGRAM(s) Oral daily  metoprolol tartrate 25 milliGRAM(s) Oral every 12 hours  octreotide  Injectable 100 MICROGram(s) IV Push every 8 hours  pantoprazole    Tablet 40 milliGRAM(s) Oral before breakfast  Parenteral Nutrition - Adult 1 Each (85 mL/Hr) TPN Continuous <Continuous>  venlafaxine XR. 300 milliGRAM(s) Oral daily    MEDICATIONS  (PRN):  acetaminophen     Tablet .. 650 milliGRAM(s) Oral every 6 hours PRN Mild Pain (1 - 3)  benzocaine/menthol Lozenge 2 Lozenge Oral every 4 hours PRN Sore Throat  dextrose Oral Gel 15 Gram(s) Oral once PRN Blood Glucose LESS THAN 70 milliGRAM(s)/deciliter  hydrALAZINE Injectable 10 milliGRAM(s) IV Push every 6 hours PRN SBP > 140  melatonin 5 milliGRAM(s) Oral at bedtime PRN Sleep      Drips:     ICU Vital Signs Last 24 Hrs  T(C): 36.8 (21 Jul 2023 21:55), Max: 37.3 (21 Jul 2023 09:22)  T(F): 98.3 (21 Jul 2023 21:55), Max: 99.2 (21 Jul 2023 09:22)  HR: 79 (22 Jul 2023 05:00) (65 - 81)  BP: 173/75 (22 Jul 2023 05:00) (125/82 - 173/75)  BP(mean): 108 (22 Jul 2023 05:00) (85 - 130)  ABP: --  ABP(mean): --  RR: 31 (22 Jul 2023 05:00) (20 - 48)  SpO2: 95% (22 Jul 2023 05:00) (90% - 97%)    O2 Parameters below as of 22 Jul 2023 05:00  Patient On (Oxygen Delivery Method): nasal cannula  O2 Flow (L/min): 2          Physical Exam:  General: NAD  HEENT: NC/AT, EOMI, PERRLA, normal hearing, no oral lesions, neck supple w/o LAD  Pulmonary: Nonlabored breathing, no respiratory distress, CTA-B  Cardiovascular: NSR, no murmurs  Abdominal: soft, NT/ND, wound vac in place  Extremities: WWP, 5/5 strength x 4, no clubbing/cyanosis/edema  Neuro: A/O x3, CNs II-XII grossly intact, normal motor/sensation, no focal deficits  Pulses: palpable distal pulses    Lines/tubes/drains:  Poole:	      Vent settings:      I&O's Summary    20 Jul 2023 07:01  -  21 Jul 2023 07:00  --------------------------------------------------------  IN: 1993.2 mL / OUT: 3400 mL / NET: -1406.8 mL    21 Jul 2023 07:01  -  22 Jul 2023 05:50  --------------------------------------------------------  IN: 2547 mL / OUT: 1750 mL / NET: 797 mL        LABS:                        8.9    16.33 )-----------( 336      ( 22 Jul 2023 05:39 )             27.5     07-21    133<L>  |  98  |  22  ----------------------------<  131<H>  4.2   |  23  |  0.97    Ca    7.3<L>      21 Jul 2023 06:07  Phos  3.2     07-21  Mg     2.2     07-21    TPro  7.7  /  Alb  2.2<L>  /  TBili  5.8<H>  /  DBili  4.2<H>  /  AST  148<H>  /  ALT  106<H>  /  AlkPhos  116  07-21      Urinalysis Basic - ( 21 Jul 2023 06:07 )    Color: x / Appearance: x / SG: x / pH: x  Gluc: 131 mg/dL / Ketone: x  / Bili: x / Urobili: x   Blood: x / Protein: x / Nitrite: x   Leuk Esterase: x / RBC: x / WBC x   Sq Epi: x / Non Sq Epi: x / Bacteria: x      CAPILLARY BLOOD GLUCOSE      POCT Blood Glucose.: 131 mg/dL (22 Jul 2023 05:28)  POCT Blood Glucose.: 133 mg/dL (22 Jul 2023 00:29)  POCT Blood Glucose.: 124 mg/dL (21 Jul 2023 17:04)  POCT Blood Glucose.: 130 mg/dL (21 Jul 2023 11:07)    LIVER FUNCTIONS - ( 21 Jul 2023 06:07 )  Alb: 2.2 g/dL / Pro: 7.7 g/dL / ALK PHOS: 116 U/L / ALT: 106 U/L / AST: 148 U/L / GGT: x             Cultures:    RADIOLOGY & ADDITIONAL STUDIES:  CXR 7/22  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

## 2023-07-23 NOTE — PROGRESS NOTE ADULT - SUBJECTIVE AND OBJECTIVE BOX
Pt seen and examined   events noted  had fever  started on Meropenem      REVIEW OF SYSTEM    Gen: + fever  Cardiovascular: No chest pain, palpitations, dizziness or leg swelling  Gastrointestinal: No abdominal or epigastric pain. No nausea, vomiting or hematemesis; No diarrhea or constipation. No melena or hematochezia.  Skin: No itching, burning, rashes or lesions       MEDICATIONS:  MEDICATIONS  (STANDING):  aMIOdarone    Tablet 200 milliGRAM(s) Oral daily  amLODIPine   Tablet 5 milliGRAM(s) Oral daily  atorvastatin 20 milliGRAM(s) Oral at bedtime  chlorhexidine 2% Cloths 1 Application(s) Topical <User Schedule>  cholestyramine Powder (Sugar-Free) 4 Gram(s) Oral two times a day  dextrose 5%. 1000 milliLiter(s) (50 mL/Hr) IV Continuous <Continuous>  dextrose 5%. 1000 milliLiter(s) (100 mL/Hr) IV Continuous <Continuous>  dextrose 50% Injectable 25 Gram(s) IV Push once  dextrose 50% Injectable 12.5 Gram(s) IV Push once  dextrose 50% Injectable 25 Gram(s) IV Push once  glucagon  Injectable 1 milliGRAM(s) IntraMuscular once  heparin   Injectable 5000 Unit(s) SubCutaneous every 8 hours  insulin lispro (ADMELOG) corrective regimen sliding scale   SubCutaneous every 6 hours  levothyroxine 50 MICROGram(s) Oral daily  lipid, fat emulsion (Fish Oil and Plant Based) 20% Infusion 0.5 Gm/kG/Day (20.83 mL/Hr) IV Continuous <Continuous>  losartan 25 milliGRAM(s) Oral daily  meropenem  IVPB 1000 milliGRAM(s) IV Intermittent every 8 hours  metoprolol tartrate 25 milliGRAM(s) Oral every 12 hours  octreotide  Injectable 100 MICROGram(s) IV Push every 8 hours  pantoprazole  Injectable 40 milliGRAM(s) IV Push every 12 hours  Parenteral Nutrition - Adult 1 Each (85 mL/Hr) TPN Continuous <Continuous>  Parenteral Nutrition - Adult 1 Each (85 mL/Hr) TPN Continuous <Continuous>  venlafaxine XR. 300 milliGRAM(s) Oral daily    MEDICATIONS  (PRN):  acetaminophen     Tablet .. 650 milliGRAM(s) Oral every 6 hours PRN Mild Pain (1 - 3)  benzocaine/menthol Lozenge 2 Lozenge Oral every 4 hours PRN Sore Throat  dextrose Oral Gel 15 Gram(s) Oral once PRN Blood Glucose LESS THAN 70 milliGRAM(s)/deciliter  hydrALAZINE Injectable 10 milliGRAM(s) IV Push every 4 hours PRN SBP > 140  melatonin 5 milliGRAM(s) Oral at bedtime PRN Sleep      Allergies    penicillin (Angioedema)    Intolerances        Vital Signs Last 24 Hrs  T(C): 38.1 (2023 09:00), Max: 38.4 (2023 04:00)  T(F): 100.6 (2023 09:00), Max: 101.2 (2023 04:00)  HR: 78 (2023 16:00) (61 - 83)  BP: 158/67 (2023 16:00) (113/55 - 197/84)  BP(mean): 97 (2023 16:00) (77 - 127)  RR: 35 (2023 16:00) (21 - 37)  SpO2: 91% (2023 16:00) (91% - 97%)    Parameters below as of 2023 11:00  Patient On (Oxygen Delivery Method): room air         @ : @ 07:00  --------------------------------------------------------  IN: 3010.4 mL / OUT: 3250 mL / NET: -239.6 mL     @ : @ 16:03  --------------------------------------------------------  IN: 660 mL / OUT: 350 mL / NET: 310 mL        PHYSICAL EXAM:    General: in no acute distress  HEENT: MMM, conjunctiva and sclera clear  Gastrointestinal: Soft   non-distended; Normal bowel sounds; ileostomy scant output  Skin: Warm and dry. No obvious rash    LABS:  ABG - ( 2023 08:05 )  pH, Arterial: 7.46  pH, Blood: x     /  pCO2: 33    /  pO2: 72    / HCO3: 24    / Base Excess: 0.3   /  SaO2: 97.1                CBC Full  -  ( 2023 05:30 )  WBC Count : 16.86 K/uL  RBC Count : 2.76 M/uL  Hemoglobin : 8.3 g/dL  Hematocrit : 26.0 %  Platelet Count - Automated : 335 K/uL  Mean Cell Volume : 94.2 fl  Mean Cell Hemoglobin : 30.1 pg  Mean Cell Hemoglobin Concentration : 31.9 gm/dL  Auto Neutrophil # : x  Auto Lymphocyte # : x  Auto Monocyte # : x  Auto Eosinophil # : x  Auto Basophil # : x  Auto Neutrophil % : x  Auto Lymphocyte % : x  Auto Monocyte % : x  Auto Eosinophil % : x  Auto Basophil % : x        133<L>  |  103  |  22  ----------------------------<  110<H>  4.4   |  22  |  0.91    Ca    7.7<L>      2023 05:30  Phos  3.3       Mg     2.1         TPro  7.5  /  Alb  2.2<L>  /  TBili  6.6<H>  /  DBili  4.8<H>  /  AST  166<H>  /  ALT  132<H>  /  AlkPhos  119            Urinalysis Basic - ( 2023 06:31 )    Color: Yellow / Appearance: Clear / S.015 / pH: x  Gluc: x / Ketone: NEGATIVE  / Bili: Moderate / Urobili: 1.0 E.U./dL   Blood: x / Protein: 100 mg/dL / Nitrite: NEGATIVE   Leuk Esterase: NEGATIVE / RBC: < 5 /HPF / WBC < 5 /HPF   Sq Epi: x / Non Sq Epi: x / Bacteria: Present /HPF                RADIOLOGY & ADDITIONAL STUDIES (The following images were personally reviewed):

## 2023-07-23 NOTE — PROGRESS NOTE ADULT - SUBJECTIVE AND OBJECTIVE BOX
Interval Events:  Patient seen and examined at bedside.      Allergies    penicillin (Angioedema)    Intolerances        Vital Signs Last 24 Hrs  T(C): 38.1 (2023 09:00), Max: 38.4 (2023 04:00)  T(F): 100.6 (2023 09:00), Max: 101.2 (2023 04:00)  HR: 61 (:) (61 - 84)  BP: 113/55 (:) (113/55 - 176/118)  BP(mean): 79 (:) (77 - 141)  RR: 21 (:) (21 - 38)  SpO2: 93% (:) (92% - 100%)    Parameters below as of 2023 09:00  Patient On (Oxygen Delivery Method): room air         @ 07:  -   @ 07:00  --------------------------------------------------------  IN: 3010.4 mL / OUT: 3250 mL / NET: -239.6 mL     @ 07:  -   @ 09:37  --------------------------------------------------------  IN: 85 mL / OUT: 0 mL / NET: 85 mL       @ 07:  -   @ 07:00  --------------------------------------------------------  IN: 3010.4 mL / OUT: 3250 mL / NET: -239.6 mL    23 @ 07:01  -   @ 09:37  --------------------------------------------------------  IN: 85 mL / OUT: 0 mL / NET: 85 mL        Physical Exam:     Gen: NAD well nourished  Neuro: A&OX3 No deficits  CV:RRR Reg s1s2 noM  Pulm: CTA b/l No w/r/r  Abd: Soft NT ND + BS  Ext: No C/C/E   Vasc: + DP b/l   Skin: no rashes noted  MSK: No joint swelling  Psych: No signs of anxiety or depression      LABS:  ABG - ( 2023 08:05 )  pH, Arterial: 7.46  pH, Blood: x     /  pCO2: 33    /  pO2: 72    / HCO3: 24    / Base Excess: 0.3   /  SaO2: 97.1                CBC Full  -  ( 2023 05:30 )  WBC Count : 16.86 K/uL  RBC Count : 2.76 M/uL  Hemoglobin : 8.3 g/dL  Hematocrit : 26.0 %  Platelet Count - Automated : 335 K/uL  Mean Cell Volume : 94.2 fl  Mean Cell Hemoglobin : 30.1 pg  Mean Cell Hemoglobin Concentration : 31.9 gm/dL  Auto Neutrophil # : x  Auto Lymphocyte # : x  Auto Monocyte # : x  Auto Eosinophil # : x  Auto Basophil # : x  Auto Neutrophil % : x  Auto Lymphocyte % : x  Auto Monocyte % : x  Auto Eosinophil % : x  Auto Basophil % : x    07-    133<L>  |  103  |  22  ----------------------------<  110<H>  4.4   |  22  |  0.91    Ca    7.7<L>      2023 05:30  Phos  3.3     07-23  Mg     2.1     07-23    TPro  7.5  /  Alb  2.2<L>  /  TBili  6.6<H>  /  DBili  4.8<H>  /  AST  166<H>  /  ALT  132<H>  /  AlkPhos  119  07-23          Urinalysis Basic - ( 2023 06:31 )    Color: Yellow / Appearance: Clear / S.015 / pH: x  Gluc: x / Ketone: NEGATIVE  / Bili: Moderate / Urobili: 1.0 E.U./dL   Blood: x / Protein: 100 mg/dL / Nitrite: NEGATIVE   Leuk Esterase: NEGATIVE / RBC: < 5 /HPF / WBC < 5 /HPF   Sq Epi: x / Non Sq Epi: x / Bacteria: Present /HPF              RADIOLOGY & ADDITIONAL STUDIES (The following images were personally reviewed):          A/p: 78yMale Interval Events: Febrile to 101.2. Blood cultures, urine, chest x-ray performed. Very drowsy and slow to wake this morning. STAT ABG sent. STAT ammonia sent. CT head, chest, abdomen and pelvis ordered.     Patient seen and examined at bedside, continues to be drowsy, but wakes and is oriented x3. ROS negative. Does not endorse shortness of breath, chest pain, abdominal pain, nausea.       Allergies    penicillin (Angioedema)    Intolerances        Vital Signs Last 24 Hrs  T(C): 38.1 (2023 09:00), Max: 38.4 (2023 04:00)  T(F): 100.6 (:00), Max: 101.2 (2023 04:00)  HR: 61 (:) (61 - 84)  BP: 113/55 (:) (113/55 - 176/118)  BP(mean): 79 (:) (77 - 141)  RR: 21 (:) (21 - 38)  SpO2: 93% (:) (92% - 100%)    Parameters below as of 2023 09:00  Patient On (Oxygen Delivery Method): room air         @ : @ 07:00  --------------------------------------------------------  IN: 3010.4 mL / OUT: 3250 mL / NET: -239.6 mL     @ : @ 09:37  --------------------------------------------------------  IN: 85 mL / OUT: 0 mL / NET: 85 mL       @ 07: @ 07:00  --------------------------------------------------------  IN: 3010.4 mL / OUT: 3250 mL / NET: -239.6 mL     @ 07:01  -   @ 09:37  --------------------------------------------------------  IN: 85 mL / OUT: 0 mL / NET: 85 mL        Physical Exam:     Gen: No   Neuro: A&OX3 No deficits  CV:RRR Reg s1s2 noM  Pulm: CTA b/l No w/r/r  Abd: Soft NT ND + BS  Ext: No C/C/E   Vasc: + DP b/l   Skin: no rashes noted  MSK: No joint swelling  Psych: No signs of anxiety or depression      LABS:  ABG - ( 2023 08:05 )  pH, Arterial: 7.46  pH, Blood: x     /  pCO2: 33    /  pO2: 72    / HCO3: 24    / Base Excess: 0.3   /  SaO2: 97.1                CBC Full  -  ( 2023 05:30 )  WBC Count : 16.86 K/uL  RBC Count : 2.76 M/uL  Hemoglobin : 8.3 g/dL  Hematocrit : 26.0 %  Platelet Count - Automated : 335 K/uL  Mean Cell Volume : 94.2 fl  Mean Cell Hemoglobin : 30.1 pg  Mean Cell Hemoglobin Concentration : 31.9 gm/dL  Auto Neutrophil # : x  Auto Lymphocyte # : x  Auto Monocyte # : x  Auto Eosinophil # : x  Auto Basophil # : x  Auto Neutrophil % : x  Auto Lymphocyte % : x  Auto Monocyte % : x  Auto Eosinophil % : x  Auto Basophil % : x        133<L>  |  103  |  22  ----------------------------<  110<H>  4.4   |  22  |  0.91    Ca    7.7<L>      2023 05:30  Phos  3.3       Mg     2.1         TPro  7.5  /  Alb  2.2<L>  /  TBili  6.6<H>  /  DBili  4.8<H>  /  AST  166<H>  /  ALT  132<H>  /  AlkPhos  119  07-23          Urinalysis Basic - ( 2023 06:31 )    Color: Yellow / Appearance: Clear / S.015 / pH: x  Gluc: x / Ketone: NEGATIVE  / Bili: Moderate / Urobili: 1.0 E.U./dL   Blood: x / Protein: 100 mg/dL / Nitrite: NEGATIVE   Leuk Esterase: NEGATIVE / RBC: < 5 /HPF / WBC < 5 /HPF   Sq Epi: x / Non Sq Epi: x / Bacteria: Present /HPF              RADIOLOGY & ADDITIONAL STUDIES (The following images were personally reviewed):          A/p: 78yMale Interval Events: Febrile to 101.2. Blood cultures, urine, chest x-ray performed. Very drowsy and slow to wake this morning. STAT ABG sent. STAT ammonia sent. CT head, chest, abdomen and pelvis ordered.     Patient seen and examined at bedside, continues to be drowsy, but wakes and is oriented x3. ROS negative. Does not endorse shortness of breath, chest pain, abdominal pain, nausea.       Allergies    penicillin (Angioedema)    Intolerances        Vital Signs Last 24 Hrs  T(C): 38.1 (2023 09:00), Max: 38.4 (2023 04:00)  T(F): 100.6 (:00), Max: 101.2 (2023 04:00)  HR: 61 (:) (61 - 84)  BP: 113/55 (:) (113/55 - 176/118)  BP(mean): 79 (:) (77 - 141)  RR: 21 (:) (21 - 38)  SpO2: 93% (:) (92% - 100%)    Parameters below as of 2023 09:00  Patient On (Oxygen Delivery Method): room air         @ : @ 07:00  --------------------------------------------------------  IN: 3010.4 mL / OUT: 3250 mL / NET: -239.6 mL     @ : @ 09:37  --------------------------------------------------------  IN: 85 mL / OUT: 0 mL / NET: 85 mL       @ 07: @ 07:00  --------------------------------------------------------  IN: 3010.4 mL / OUT: 3250 mL / NET: -239.6 mL     @ 07:01  -   @ 09:37  --------------------------------------------------------  IN: 85 mL / OUT: 0 mL / NET: 85 mL        Physical Exam:     Gen: No acute distress   Neuro: Drowsy, wakes to light sternal rub. Oriented x3. No focal neuro defecit. Bilateral upper extremities 5/5, bilateral lower extremity 4/5.   CV: Regular rate and rhythm, no murmurs or rubs, no peripheral edema   Pulm: Ronchi in the left posterior lung field, R lung field clear. No increased work of breathing   Abd: Soft, non-tender, wound vac in place   Ext: Warm, well-perfused    Vasc: +2 radial/pedal pulses    Skin: mildly jaundice scleral icterus; no rashes noted  MSK: No joint swelling  Psych: No signs of anxiety or depression      LABS:  ABG - ( 2023 08:05 )  pH, Arterial: 7.46  pH, Blood: x     /  pCO2: 33    /  pO2: 72    / HCO3: 24    / Base Excess: 0.3   /  SaO2: 97.1                CBC Full  -  ( 2023 05:30 )  WBC Count : 16.86 K/uL  RBC Count : 2.76 M/uL  Hemoglobin : 8.3 g/dL  Hematocrit : 26.0 %  Platelet Count - Automated : 335 K/uL  Mean Cell Volume : 94.2 fl  Mean Cell Hemoglobin : 30.1 pg  Mean Cell Hemoglobin Concentration : 31.9 gm/dL          133<L>  |  103  |  22  ----------------------------<  110<H>  4.4   |  22  |  0.91    Ca    7.7<L>      2023 05:30  Phos  3.3       Mg     2.1         TPro  7.5  /  Alb  2.2<L>  /  TBili  6.6<H>  /  DBili  4.8<H>  /  AST  166<H>  /  ALT  132<H>  /  AlkPhos  119            Urinalysis Basic - ( 2023 06:31 )    Color: Yellow / Appearance: Clear / S.015 / pH: x  Gluc: x / Ketone: NEGATIVE  / Bili: Moderate / Urobili: 1.0 E.U./dL   Blood: x / Protein: 100 mg/dL / Nitrite: NEGATIVE   Leuk Esterase: NEGATIVE / RBC: < 5 /HPF / WBC < 5 /HPF   Sq Epi: x / Non Sq Epi: x / Bacteria: Present /HPF      RADIOLOGY & ADDITIONAL STUDIES (The following images were personally reviewed):      ACC: [id number] EXAM:  CT BRAIN   ORDERED BY: PAVEL RODRIGUEZ     PROCEDURE DATE:  2023          INTERPRETATION:  INDICATIONS: Altered mental status. Postoperative.    TECHNIQUE: Serial axial images were obtained from the skull base to the   vertex without the use of intravenous contrast. Sagittal and coronal   images were reformatted.    COMPARISON EXAMINATION: None.    FINDINGS:  VENTRICLES AND SULCI: Parenchymal volume is consistent with patient age.     No hydrocephalus.  INTRA-AXIAL: No intracranial masslike lesion, acute hemorrhage or acute   transcortical infarct is seen. There is hypoattenuation of the   subcortical and periventricular white matter, which is a nonspecific   finding, but most likely represents sequela of mild microvascular   ischemic disease.  EXTRA-AXIAL: No fluid collection is present.  VISUALIZED SINUSES: No air-fluid levels are identified.  VISUALIZED MASTOIDS: Clear.  CALVARIUM: No acute calvarial fracture.  MISCELLANEOUS: Status post bilateral ocular lens replacement.    IMPRESSION:  No acute hemorrhage, large demarcated territorial infarction, or masslike   lesion.    --- End of Report ---      CHEST WALL AND LOWER NECK: Within normal limits.    ABDOMEN AND PELVIS:  LIVER: Scattered hepatic cysts. Otherwise, within normal limits.  BILE DUCTS: Normal caliber.  GALLBLADDER: Distended with layering sludge.  SPLEEN: There is decreased enhancement of the medial spleen, compatible   with splenic infarct. The splenic artery and vein are patent.  PANCREAS: Within normal limits.  ADRENALS: Within normal limits.  KIDNEYS/URETERS: Bilateral renal cysts. Trace bilateral perinephric   stranding. Otherwise, within normal limits.    BLADDER: Minimally distended.  REPRODUCTIVE ORGANS: The prostate is not enlarged.    BOWEL: The stomach is unremarkable. Status post multiple small bowel   resections and right hemicolectomy. Mid transverse mucous fistula left   abdominal wall. Ileostomy to the left abdominal wall with extravasation   of oral contrast along the ventral abdominal wound with overlying wound   vac. There is trace contrast tracking along the right anterior peritoneal   reflection.. There is minimal tracking fluid in the right lateral   peritoneal cavity. There is moderate stranding of the small bowel   mesentery.    PERITONEUM: No ascites.  VESSELS: Within normal limits.  RETROPERITONEUM/LYMPH NODES: No lymphadenopathy.  ABDOMINAL WALL: Wall fat-containing left inguinal hernia.  BONES: Spondylosis of the thoracic and lumbar spine.    IMPRESSION:  1. Trace oral contrast tracking along the right anterior peritoneal   reflection. No evidence of drainable collection.  2. Medial splenic infarct.  3. Mild bilateral layering pleural effusions, left greater than right,   with compressive atelectasis.    --- End of Report ---

## 2023-07-23 NOTE — CHART NOTE - NSCHARTNOTEFT_GEN_A_CORE
Infectious Diseases Anti-infective Approval Note    Medication: Daptomycin   Dose:  800 mg  Route:  IV  Frequency:  daily  Duration:  7 days    Duration refers to duration of approval, not recommended duration of treatment     Dose may be adjusted as needed for alterations in renal function.    *THIS IS NOT AN INFECTIOUS DISEASES CONSULTATION*

## 2023-07-23 NOTE — PROGRESS NOTE ADULT - SUBJECTIVE AND OBJECTIVE BOX
STATUS POST:  Exploratory laparotomy    Laparoscopic lysis of intestinal adhesions    Exploratory laparotomy    Open lysis of intestinal adhesions    Resection of small bowel    Temporary closure of abdominal cavity    Repair, anastomosis, ileocolic    Small bowel resection with anastomosis    Flap, myocutaneous, abdominal wall    Reconstruction of abdominal wall using mesh    Washout of abdominal cavity    Creation of ileostomy    Creation of colostomy    Laparoscopic lysis of intestinal adhesions    Open lysis of intestinal adhesions    Resection of small bowel    Temporary closure of abdominal cavity    Repair, anastomosis, ileocolic    Small bowel resection with anastomosis    Flap, myocutaneous, abdominal wall    Reconstruction of abdominal wall using mesh    Washout of abdominal cavity        POST OPERATIVE DAY #:     SUBJECTIVE:     Vital Signs Last 24 Hrs  T(C): 38.4 (2023 04:00), Max: 38.4 (2023 04:00)  T(F): 101.2 (2023 04:00), Max: 101.2 (2023 04:00)  HR: 61 (2023 08:00) (61 - 84)  BP: 114/56 (2023 08:00) (114/56 - 176/118)  BP(mean): 80 (2023 08:00) (77 - 141)  RR: 25 (2023 08:00) (22 - 38)  SpO2: 93% (2023 08:00) (92% - 100%)    Parameters below as of 2023 08:00  Patient On (Oxygen Delivery Method): room air        I&O's Summary    2023 07:01  -  2023 07:00  --------------------------------------------------------  IN: 3010.4 mL / OUT: 3250 mL / NET: -239.6 mL    2023 07:01  -  2023 08:55  --------------------------------------------------------  IN: 85 mL / OUT: 0 mL / NET: 85 mL        Physical Exam:  General Appearance: Appears well, NAD  Pulmonary: Nonlabored breathing, no respiratory distress  Cardiovascular: NSR  Abdomen: Soft, nondisteded, appropriate incisional tenderness, dressings clean and dry and intact  Extremities: WWP, SCD's in place     LABS:                        8.3    16.86 )-----------( 335      ( 2023 05:30 )             26.0     07-23    133<L>  |  103  |  22  ----------------------------<  110<H>  4.4   |  22  |  0.91    Ca    7.7<L>      2023 05:30  Phos  3.3     07-  Mg     2.1         TPro  7.5  /  Alb  2.2<L>  /  TBili  6.6<H>  /  DBili  4.8<H>  /  AST  166<H>  /  ALT  132<H>  /  AlkPhos  119  07-23      Urinalysis Basic - ( 2023 06:31 )    Color: Yellow / Appearance: Clear / S.015 / pH: x  Gluc: x / Ketone: NEGATIVE  / Bili: Moderate / Urobili: 1.0 E.U./dL   Blood: x / Protein: 100 mg/dL / Nitrite: NEGATIVE   Leuk Esterase: NEGATIVE / RBC: < 5 /HPF / WBC < 5 /HPF   Sq Epi: x / Non Sq Epi: x / Bacteria: Present /HPF       STATUS POST:  exlap, ileocolic resection w end ileostomy, blow hole colostomy    POST OPERATIVE DAY #: 18    SUBJECTIVE: Patient was seen and examined bedside. Patient resting comfortably in bed with no acute complaints. Denies NV. Denies pain.    Vital Signs Last 24 Hrs  T(C): 38.4 (2023 04:00), Max: 38.4 (2023 04:00)  T(F): 101.2 (2023 04:00), Max: 101.2 (2023 04:00)  HR: 61 (2023 08:00) (61 - 84)  BP: 114/56 (2023 08:00) (114/56 - 176/118)  BP(mean): 80 (2023 08:00) (77 - 141)  RR: 25 (2023 08:00) (22 - 38)  SpO2: 93% (2023 08:00) (92% - 100%)    Parameters below as of 2023 08:00  Patient On (Oxygen Delivery Method): room air        I&O's Summary    2023 07:01  -  2023 07:00  --------------------------------------------------------  IN: 3010.4 mL / OUT: 3250 mL / NET: -239.6 mL    2023 07:01  -  2023 08:55  --------------------------------------------------------  IN: 85 mL / OUT: 0 mL / NET: 85 mL        Physical Exam:  General Appearance: Appears well, NAD  Pulmonary: Nonlabored breathing, no respiratory distress  Cardiovascular: NSR  Abdomen: Soft, nondistended, nontender, wound vac in place  Extremities: WWP, SCD's in place     LABS:                        8.3    16.86 )-----------( 335      ( 2023 05:30 )             26.0     07-23    133<L>  |  103  |  22  ----------------------------<  110<H>  4.4   |  22  |  0.91    Ca    7.7<L>      2023 05:30  Phos  3.3       Mg     2.1         TPro  7.5  /  Alb  2.2<L>  /  TBili  6.6<H>  /  DBili  4.8<H>  /  AST  166<H>  /  ALT  132<H>  /  AlkPhos  119        Urinalysis Basic - ( 2023 06:31 )    Color: Yellow / Appearance: Clear / S.015 / pH: x  Gluc: x / Ketone: NEGATIVE  / Bili: Moderate / Urobili: 1.0 E.U./dL   Blood: x / Protein: 100 mg/dL / Nitrite: NEGATIVE   Leuk Esterase: NEGATIVE / RBC: < 5 /HPF / WBC < 5 /HPF   Sq Epi: x / Non Sq Epi: x / Bacteria: Present /HPF

## 2023-07-23 NOTE — PROGRESS NOTE ADULT - ASSESSMENT
78M s/p  exlap, ileocolic resection w end ileostomy, blow hole colostomy (7/5)    -Continue TPN  -Wound vac change Monday  -Rest of care per SICU

## 2023-07-23 NOTE — PROGRESS NOTE ADULT - ASSESSMENT
78M w/ Crohn's, AFib/Flutter s/p DCCVs on amiodarone, remote ileocectomy and open appy here for SBO vs Crohns flare, s/p NGT decompression and now s/p lap TRE converted to open TRE, SBR x 3, left in discontinuity with abthera vac on 6/27, RTOR for ileocolic resection, small bowel anastomosis, and abdominal wall closure on 6/28, and s/p IR aspiration of perihepatic fluid on 7/3, stepped down to telemetery on 7/4. wound dehisence on 7/5, RTOR exlap, washout, ileocolic resection with end ileostomy, blow hole colostomy, red rubber from ileostomy to small bowel anastomosis; vicryl bridging mesh on 7/5. Transferred to SICU post op for HD monitoring. Extubated 7/6.    Neuro: Tylenol PRN for pain. Hx anxiety: Continue venlafaxine  CV: MAP > 65. Hx of Afib/flutter: s/p DCCV, on PO amiodarone, metoprolol. Hx of HTN- continue home losartan and continue hydralazine. Starting amlodipine 5 daily. IV PRN for SBP < 140. Hx of HLD: continue home rosuvastatin; Repeat TTE  (07/18) - PASP 64mmHg, EF 65-70%%. Appears euvolemic - no diuresis today  Pulm: Extubated 7/6 - now on RA/NC saturating well. s/p Chest tube by CT surg (6/27--7/3)   GI: CLD. TPN/lipids, wound dehisense -- RTOR on 7/5 for exlap, washout, ileocolic resection with end ileostomy, blow hole colostomy; vicryl bridging mesh explanted today and vac replaced - Continue Octreotide and cholestyramine. T bili elevated today with scleral icterus - RUQ duplex today.  : Voids.   ID: Not currently on antibiotics. Perforated viscus, IR Cx 7/3: C. tertium, Lactobacillis. OR Cx 7/5 - rare yeast - Imipenem (6/30-7/12), Fluconazole (6/30 -7/12) OR Cx 6/28: Vanc-resistant/amp-sens (but allergic) enterococcus s/p CTX (6/26-6/30), Flagyl (6/26-6/30), Dapto (6/30-7/5).  Endo: ISS; Hx of hypothyroid: cont synthroid IV. Gout: holding home allopurionol  PPx: SCD, HSQ. LE duplex 7/13 neg.  Lines: PIV x 2, RUE PICC (6/30--), [place]eddes (7/8-7/10) // DC: R Chest tube for iatrogenic PTX (6/27--7/3), R TLC (06/26-30). R TLC (7/5 -7/12)  Wounds/Drains: midline incision; wound vac changes q48h with fistula opening; R JOYCE below ileostomy (D/C 7/17), L JOYCE at small bowel enterotomy repair.   PT/OT: OOB ambulating in [place]re[place]   Dispo: SICU   78M w/ Crohn's, AFib/Flutter s/p DCCVs on amiodarone, remote ileocectomy and open appy here for SBO vs Crohns flare, s/p NGT decompression and now s/p lap TRE converted to open TRE, SBR x 3, left in discontinuity with abthera vac on 6/27, RTOR for ileocolic resection, small bowel anastomosis, and abdominal wall closure on 6/28, and s/p IR aspiration of perihepatic fluid on 7/3, stepped down to telemetery on 7/4. wound dehisence on 7/5, RTOR exlap, washout, ileocolic resection with end ileostomy, blow hole colostomy, red rubber from ileostomy to small bowel anastomosis; vicryl bridging mesh on 7/5. Transferred to SICU post op for HD monitoring. Extubated 7/6.    Neuro: Tylenol PRN for pain. Hx anxiety: Continue venlafaxine  CV: MAP > 65. Hx of Afib/flutter: s/p DCCV, on PO amiodarone, metoprolol. Hx of HTN- continue home losartan and continue hydralazine. Starting amlodipine 5 daily. IV Hydralazine PRN for SBP < 140 increased to q4h. Hx of HLD: continue home rosuvastatin; Repeat TTE  (07/18) - PASP 64mmHg, EF 65-70%%. Appears euvolemic - no diuresis today  Pulm: Extubated 7/6 - now on RA/NC saturating well. s/p Chest tube by CT surg (6/27--7/3)   GI: CLD. TPN/lipids, wound dehisense -- RTOR on 7/5 for exlap, washout, ileocolic resection with end ileostomy, blow hole colostomy; vicryl bridging mesh explanted today and vac replaced - Continue Octreotide and cholestyramine. T bili elevated today with scleral icterus - RUQ duplex today.  : Voids.   ID: SIRS: Blood Cx sent 7/23. Started Cefepime 2g (7/23--) Flagyl (7/23--) Dapto (7/23--) Perforated viscus, IR Cx 7/3: C. tertium, Lactobacillis. OR Cx 7/5 - rare yeast - Imipenem (6/30-7/12), Fluconazole (6/30 -7/12) OR Cx 6/28: Vanc-resistant/amp-sens (but allergic) enterococcus s/p CTX (6/26-6/30), Flagyl (6/26-6/30), Dapto (6/30-7/5).  Endo: ISS; Hx of hypothyroid: cont synthroid IV. Gout: holding home allopurionol  PPx: SCD, HSQ. LE duplex 7/13 neg.  Lines: PIV x 2, RUE PICC (6/30--), Mousie (7/8-7/10) // DC: R Chest tube for iatrogenic PTX (6/27--7/3), R TLC (06/26-30). R TLC (7/5 -7/12)  Wounds/Drains: midline incision; wound vac changes q48h with fistula opening; R JOYCE below ileostomy (D/C 7/17), L JOYCE at small bowel enterotomy repair.   PT/OT: OOB ambulating in [place]re[place]   Dispo: SICU   78M w/ Crohn's, AFib/Flutter s/p DCCVs on amiodarone, remote ileocectomy and open appy here for SBO vs Crohns flare, s/p NGT decompression and now s/p lap TRE converted to open TRE, SBR x 3, left in discontinuity with abthera vac on 6/27, RTOR for ileocolic resection, small bowel anastomosis, and abdominal wall closure on 6/28, and s/p IR aspiration of perihepatic fluid on 7/3, stepped down to telemetery on 7/4. wound dehisence on 7/5, RTOR exlap, washout, ileocolic resection with end ileostomy, blow hole colostomy, red rubber from ileostomy to small bowel anastomosis; vicryl bridging mesh on 7/5. Transferred to SICU post op for HD monitoring. Extubated 7/6.    Neuro: Tylenol PRN for pain. Hx anxiety: Continue venlafaxine. Head CT negative. Hold narcotics for drowsiness.   CV: MAP > 65. Hx of Afib/flutter: s/p DCCV, on PO amiodarone, metoprolol. Hx of HTN- continue home losartan and continue hydralazine. Starting amlodipine 5 daily. IV Hydralazine PRN for SBP < 140 increased to q4h. Hx of HLD: continue home rosuvastatin; Repeat TTE  (07/18) - PASP 64mmHg, EF 65-70%%. Appears euvolemic - no diuresis today  Pulm: Extubated 7/6 - now on RA/NC saturating well. s/p Chest tube by CT surg (6/27--7/3)   GI: CLD. TPN/lipids, wound dehisense -- RTOR on 7/5 for exlap, washout, ileocolic resection with end ileostomy, blow hole colostomy; vicryl bridging mesh explanted today and vac replaced - Continue Octreotide and cholestyramine. T bili elevated today with scleral icterus - RUQ duplex today.  : Voids.   ID: CT Scan abdom: continued ECF leak. CT Chest: Bilateral effusions ? PNA. Blood Cx sent 7/23. Started Meropenum 1g (7/23--) Dapto (7/23--) Perforated viscus, IR Cx 7/3: C. tertium, Lactobacillis. OR Cx 7/5 - rare yeast - Imipenem (6/30-7/12), Fluconazole (6/30 -7/12) OR Cx 6/28: Vanc-resistant/amp-sens (but allergic) enterococcus s/p CTX (6/26-6/30), Flagyl (6/26-6/30), Dapto (6/30-7/5).  Endo: ISS; Hx of hypothyroid: cont synthroid IV. Gout: holding home allopurionol  PPx: SCD, HSQ. LE duplex 7/13 neg.  Lines: PIV x 2, RUE PICC (6/30--), Griffin (7/8-7/10) // DC: R Chest tube for iatrogenic PTX (6/27--7/3), R TLC (06/26-30). R TLC (7/5 -7/12)  Wounds/Drains: midline incision; wound vac changes q48h with fistula opening; R JOYCE below ileostomy (D/C 7/17), L JOYCE at small bowel enterotomy repair.   PT/OT: OOB ambulating in castre[place]   Dispo: SICU

## 2023-07-23 NOTE — PROGRESS NOTE ADULT - SUBJECTIVE AND OBJECTIVE BOX
Pt seen and examined at bedside in the SICU. Attending coverage for Dr. Peterson.  78M with hx of Crohn's now POD18 from his end ileostomy and colostomy creation with in lay mesh abdominal closure.  Tmax overnight to 101.2 F without hypotension  Abd is soft, nondistended without significant tenderness, wound care manager in place and functioning.  Persistent leukocytosis to 16k  Plan for CT C/A/P to evaluate for undrained collection.

## 2023-07-23 NOTE — PROGRESS NOTE ADULT - ASSESSMENT
78M w/ Crohn's, AFib/Flutter s/p DCCVs on amiodarone, remote ileocectomy and open appy here for SBO vs Crohns flare, s/p NGT decompression and now s/p lap TRE converted to open TRE, SBR x 3, left in discontinuity with abthera vac on 6/27, RTOR for ileocolic resection, small bowel anastomosis, and abdominal wall closure on 6/28, and s/p IR aspiration of perihepatic fluid on 7/3, stepped down to telemetery on 7/4. wound dehisence on 7/5, RTOR exlap, washout, ileocolic resection with end ileostomy, blow hole colostomy, red rubber from ileostomy to small bowel anastomosis; vicryl bridging mesh on 7/5. Transferred to SICU post op for HD monitoring. Extubated 7/6.    f/u labs from this AM  f/u CTAP     Neuro: Tylenol PRN for pain. Hx anxiety: Continue venlafaxine  CV: MAP > 65. Hx of Afib/flutter: s/p DCCV, on PO amiodarone, metoprolol. Hx of HTN- continue home losartan and continue hydralazine. Starting amlodipine 5 daily. IV PRN for SBP < 140. Hx of HLD: continue home rosuvastatin; Repeat TTE  (07/18) - PASP 64mmHg, EF 65-70%%. Appears euvolemic - no diuresis today  Pulm: Extubated 7/6 - now on RA/NC saturating well. s/p Chest tube by CT surg (6/27--7/3)   GI: CLD. TPN/lipids, wound dehisense -- RTOR on 7/5 for exlap, washout, ileocolic resection with end ileostomy, blow hole colostomy; vicryl bridging mesh explanted today and vac replaced - Continue Octreotide and cholestyramine. T bili elevated today with scleral icterus - RUQ duplex today.  : Voids.   ID: Not currently on antibiotics. Perforated viscus, IR Cx 7/3: C. tertium, Lactobacillis. OR Cx 7/5 - rare yeast - Imipenem (6/30-7/12), Fluconazole (6/30 -7/12) OR Cx 6/28: Vanc-resistant/amp-sens (but allergic) enterococcus s/p CTX (6/26-6/30), Flagyl (6/26-6/30), Dapto (6/30-7/5).  Endo: ISS; Hx of hypothyroid: cont synthroid IV. Gout: holding home allopurionol  PPx: SCD, HSQ. LE duplex 7/13 neg.  Lines: PIV x 2, RUE PICC (6/30--), [place] (7/8-7/10) // DC: R Chest tube for iatrogenic PTX (6/27--7/3), R TLC (06/26-30). R TLC (7/5 -7/12)  Wounds/Drains: midline incision; wound vac changes q48h with fistula opening; R JOYCE below ileostomy (D/C 7/17), L JOYCE at small bowel enterotomy repair.   PT/OT: OOB ambulating in [place]   Dispo: SICU

## 2023-07-23 NOTE — PROGRESS NOTE ADULT - SUBJECTIVE AND OBJECTIVE BOX
SUBJECTIVE:      MEDICATIONS  (STANDING):  aMIOdarone    Tablet 200 milliGRAM(s) Oral daily  amLODIPine   Tablet 5 milliGRAM(s) Oral daily  atorvastatin 20 milliGRAM(s) Oral at bedtime  chlorhexidine 2% Cloths 1 Application(s) Topical <User Schedule>  cholestyramine Powder (Sugar-Free) 4 Gram(s) Oral two times a day  dextrose 5%. 1000 milliLiter(s) (50 mL/Hr) IV Continuous <Continuous>  dextrose 5%. 1000 milliLiter(s) (100 mL/Hr) IV Continuous <Continuous>  dextrose 50% Injectable 25 Gram(s) IV Push once  dextrose 50% Injectable 12.5 Gram(s) IV Push once  dextrose 50% Injectable 25 Gram(s) IV Push once  glucagon  Injectable 1 milliGRAM(s) IntraMuscular once  heparin   Injectable 5000 Unit(s) SubCutaneous every 8 hours  insulin lispro (ADMELOG) corrective regimen sliding scale   SubCutaneous every 6 hours  levothyroxine 50 MICROGram(s) Oral daily  lipid, fat emulsion (Fish Oil and Plant Based) 20% Infusion 0.5 Gm/kG/Day (20.83 mL/Hr) IV Continuous <Continuous>  lipid, fat emulsion (Fish Oil and Plant Based) 20% Infusion 0.5 Gm/kG/Day (20.83 mL/Hr) IV Continuous <Continuous>  losartan 25 milliGRAM(s) Oral daily  metoprolol tartrate 25 milliGRAM(s) Oral every 12 hours  octreotide  Injectable 100 MICROGram(s) IV Push every 8 hours  pantoprazole    Tablet 40 milliGRAM(s) Oral before breakfast  Parenteral Nutrition - Adult 1 Each (85 mL/Hr) TPN Continuous <Continuous>  Parenteral Nutrition - Adult 1 Each (85 mL/Hr) TPN Continuous <Continuous>  venlafaxine XR. 300 milliGRAM(s) Oral daily    MEDICATIONS  (PRN):  acetaminophen     Tablet .. 650 milliGRAM(s) Oral every 6 hours PRN Mild Pain (1 - 3)  benzocaine/menthol Lozenge 2 Lozenge Oral every 4 hours PRN Sore Throat  dextrose Oral Gel 15 Gram(s) Oral once PRN Blood Glucose LESS THAN 70 milliGRAM(s)/deciliter  hydrALAZINE Injectable 10 milliGRAM(s) IV Push every 4 hours PRN SBP > 140  melatonin 5 milliGRAM(s) Oral at bedtime PRN Sleep      Vital Signs Last 24 Hrs  T(C): 38.4 (2023 04:00), Max: 38.4 (2023 04:00)  T(F): 101.2 (2023 04:00), Max: 101.2 (2023 04:00)  HR: 61 (2023 08:00) (61 - 84)  BP: 114/56 (2023 08:00) (114/56 - 176/118)  BP(mean): 80 (2023 08:00) (77 - 141)  RR: 25 (2023 08:00) (22 - 38)  SpO2: 93% (:00) (92% - 100%)    Parameters below as of 2023 08:00  Patient On (Oxygen Delivery Method): room air        Physical Exam:  General: NAD, resting comfortably in bed  Pulmonary: Nonlabored breathing, no respiratory distress  Cardiovascular: NSR  Abdominal: soft, NT/ND  Extremities: WWP, normal strength  Neuro: A/O x 3, CNs II-XII grossly intact, no focal deficits    I&O's Summary    2023 07:01  -  2023 07:00  --------------------------------------------------------  IN: 3010.4 mL / OUT: 3250 mL / NET: -239.6 mL    2023 07:01  -  2023 08:24  --------------------------------------------------------  IN: 85 mL / OUT: 0 mL / NET: 85 mL        LABS:                        8.3    16.86 )-----------( 335      ( 2023 05:30 )             26.0     07-23    133<L>  |  103  |  22  ----------------------------<  110<H>  4.4   |  22  |  0.91    Ca    7.7<L>      2023 05:30  Phos  3.3       Mg     2.1         TPro  7.5  /  Alb  2.2<L>  /  TBili  6.6<H>  /  DBili  4.8<H>  /  AST  166<H>  /  ALT  132<H>  /  AlkPhos  119        Urinalysis Basic - ( 2023 06:31 )    Color: Yellow / Appearance: Clear / S.015 / pH: x  Gluc: x / Ketone: NEGATIVE  / Bili: Moderate / Urobili: 1.0 E.U./dL   Blood: x / Protein: 100 mg/dL / Nitrite: NEGATIVE   Leuk Esterase: NEGATIVE / RBC: < 5 /HPF / WBC < 5 /HPF   Sq Epi: x / Non Sq Epi: x / Bacteria: Present /HPF      CAPILLARY BLOOD GLUCOSE      POCT Blood Glucose.: 137 mg/dL (2023 05:44)  POCT Blood Glucose.: 133 mg/dL (2023 22:59)  POCT Blood Glucose.: 136 mg/dL (2023 17:30)  POCT Blood Glucose.: 132 mg/dL (2023 11:12)    LIVER FUNCTIONS - ( 2023 05:30 )  Alb: 2.2 g/dL / Pro: 7.5 g/dL / ALK PHOS: 119 U/L / ALT: 132 U/L / AST: 166 U/L / GGT: x             RADIOLOGY & ADDITIONAL STUDIES:   SUBJECTIVE:  Pt seen at bedside this AM. Pt was unarousable, unresponsive to sternal rub. Called primary team, pt woke up slightly but drowsier than baseline. Labs ordered. Pressure and HR stable.     MEDICATIONS  (STANDING):  aMIOdarone    Tablet 200 milliGRAM(s) Oral daily  amLODIPine   Tablet 5 milliGRAM(s) Oral daily  atorvastatin 20 milliGRAM(s) Oral at bedtime  chlorhexidine 2% Cloths 1 Application(s) Topical <User Schedule>  cholestyramine Powder (Sugar-Free) 4 Gram(s) Oral two times a day  dextrose 5%. 1000 milliLiter(s) (50 mL/Hr) IV Continuous <Continuous>  dextrose 5%. 1000 milliLiter(s) (100 mL/Hr) IV Continuous <Continuous>  dextrose 50% Injectable 25 Gram(s) IV Push once  dextrose 50% Injectable 12.5 Gram(s) IV Push once  dextrose 50% Injectable 25 Gram(s) IV Push once  glucagon  Injectable 1 milliGRAM(s) IntraMuscular once  heparin   Injectable 5000 Unit(s) SubCutaneous every 8 hours  insulin lispro (ADMELOG) corrective regimen sliding scale   SubCutaneous every 6 hours  levothyroxine 50 MICROGram(s) Oral daily  lipid, fat emulsion (Fish Oil and Plant Based) 20% Infusion 0.5 Gm/kG/Day (20.83 mL/Hr) IV Continuous <Continuous>  lipid, fat emulsion (Fish Oil and Plant Based) 20% Infusion 0.5 Gm/kG/Day (20.83 mL/Hr) IV Continuous <Continuous>  losartan 25 milliGRAM(s) Oral daily  metoprolol tartrate 25 milliGRAM(s) Oral every 12 hours  octreotide  Injectable 100 MICROGram(s) IV Push every 8 hours  pantoprazole    Tablet 40 milliGRAM(s) Oral before breakfast  Parenteral Nutrition - Adult 1 Each (85 mL/Hr) TPN Continuous <Continuous>  Parenteral Nutrition - Adult 1 Each (85 mL/Hr) TPN Continuous <Continuous>  venlafaxine XR. 300 milliGRAM(s) Oral daily    MEDICATIONS  (PRN):  acetaminophen     Tablet .. 650 milliGRAM(s) Oral every 6 hours PRN Mild Pain (1 - 3)  benzocaine/menthol Lozenge 2 Lozenge Oral every 4 hours PRN Sore Throat  dextrose Oral Gel 15 Gram(s) Oral once PRN Blood Glucose LESS THAN 70 milliGRAM(s)/deciliter  hydrALAZINE Injectable 10 milliGRAM(s) IV Push every 4 hours PRN SBP > 140  melatonin 5 milliGRAM(s) Oral at bedtime PRN Sleep      Vital Signs Last 24 Hrs  T(C): 38.4 (2023 04:00), Max: 38.4 (2023 04:00)  T(F): 101.2 (2023 04:00), Max: 101.2 (2023 04:00)  HR: 61 (2023 08:00) (61 - 84)  BP: 114/56 (2023 08:00) (114/56 - 176/118)  BP(mean): 80 (2023 08:00) (77 - 141)  RR: 25 (2023 08:00) (22 - 38)  SpO2: 93% (2023 08:00) (92% - 100%)    Parameters below as of 2023 08:00  Patient On (Oxygen Delivery Method): room air        Physical Exam:  General: NAD, resting comfortably in bed  Pulmonary: Nonlabored breathing, no respiratory distress  Cardiovascular: NSR  Abdominal: soft, NT/ND  Extremities: WWP, normal strength  Neuro: A/O x 3, CNs II-XII grossly intact, no focal deficits    I&O's Summary    2023 07:01  -  2023 07:00  --------------------------------------------------------  IN: 3010.4 mL / OUT: 3250 mL / NET: -239.6 mL    2023 07:01  -  2023 08:24  --------------------------------------------------------  IN: 85 mL / OUT: 0 mL / NET: 85 mL        LABS:                        8.3    16.86 )-----------( 335      ( 2023 05:30 )             26.0         133<L>  |  103  |  22  ----------------------------<  110<H>  4.4   |  22  |  0.91    Ca    7.7<L>      2023 05:30  Phos  3.3       Mg     2.1         TPro  7.5  /  Alb  2.2<L>  /  TBili  6.6<H>  /  DBili  4.8<H>  /  AST  166<H>  /  ALT  132<H>  /  AlkPhos  119        Urinalysis Basic - ( 2023 06:31 )    Color: Yellow / Appearance: Clear / S.015 / pH: x  Gluc: x / Ketone: NEGATIVE  / Bili: Moderate / Urobili: 1.0 E.U./dL   Blood: x / Protein: 100 mg/dL / Nitrite: NEGATIVE   Leuk Esterase: NEGATIVE / RBC: < 5 /HPF / WBC < 5 /HPF   Sq Epi: x / Non Sq Epi: x / Bacteria: Present /HPF      CAPILLARY BLOOD GLUCOSE      POCT Blood Glucose.: 137 mg/dL (2023 05:44)  POCT Blood Glucose.: 133 mg/dL (2023 22:59)  POCT Blood Glucose.: 136 mg/dL (2023 17:30)  POCT Blood Glucose.: 132 mg/dL (2023 11:12)    LIVER FUNCTIONS - ( 2023 05:30 )  Alb: 2.2 g/dL / Pro: 7.5 g/dL / ALK PHOS: 119 U/L / ALT: 132 U/L / AST: 166 U/L / GGT: x             RADIOLOGY & ADDITIONAL STUDIES:

## 2023-07-23 NOTE — CHART NOTE - NSCHARTNOTEFT_GEN_A_CORE
Infectious Diseases Anti-infective Approval Note    Medication:  Meropenem  Dose:  1 gram  Route:  IV  Frequency: q8hrs  Duration:  7 days    Duration refers to duration of approval, not recommended duration of treatment     Dose may be adjusted as needed for alterations in renal function.    *THIS IS NOT AN INFECTIOUS DISEASES CONSULTATION*

## 2023-07-24 NOTE — ADVANCED PRACTICE NURSE CONSULT - ASSESSMENT
Abdominal wall wound bed with pink non-granulating tissue, entero atmospheric fistula at 1 o'clock draining green effluent. Periwound wound at 2 o'clock communicating with wound bed.   Wound irrigated with normal saline and Vashe, applied Adaptic Touch as contact dressing, covered Adaptic Touch with stoma powder, used isolator with stoma paste and ostomy rings, applied black granuform, applied VAC to continuous suction. Applied ostomy rings around edges of abdominal wound with fistula, then an the Dustin pouch; inserted red rubber catheter into Dustin pouch and connected to wall suction at low setting. Applied a pediatric pouch to periwound wound at 2 o'clock.

## 2023-07-24 NOTE — PROGRESS NOTE ADULT - SUBJECTIVE AND OBJECTIVE BOX
Pt seen and examined   no complaints  out of bed  feels better  no fever  WBC better  NPO at the moment    REVIEW OF SYSTEMS:  Constitutional: No fever  Cardiovascular: No chest pain, palpitations,   Gastrointestinal: No abdominal or epigastric pain. No nausea, vomiting   Skin: No itching, burning, rashes or lesions       MEDICATIONS:  MEDICATIONS  (STANDING):  aMIOdarone    Tablet 200 milliGRAM(s) Oral daily  amLODIPine   Tablet 5 milliGRAM(s) Oral daily  atorvastatin 20 milliGRAM(s) Oral at bedtime  chlorhexidine 2% Cloths 1 Application(s) Topical <User Schedule>  cholestyramine Powder (Sugar-Free) 4 Gram(s) Oral two times a day  DAPTOmycin IVPB 800 milliGRAM(s) IV Intermittent every 24 hours  dextrose 5%. 1000 milliLiter(s) (50 mL/Hr) IV Continuous <Continuous>  dextrose 5%. 1000 milliLiter(s) (100 mL/Hr) IV Continuous <Continuous>  dextrose 50% Injectable 25 Gram(s) IV Push once  dextrose 50% Injectable 12.5 Gram(s) IV Push once  dextrose 50% Injectable 25 Gram(s) IV Push once  glucagon  Injectable 1 milliGRAM(s) IntraMuscular once  heparin   Injectable 5000 Unit(s) SubCutaneous every 8 hours  HYDROmorphone  Injectable 0.5 milliGRAM(s) IV Push once  insulin lispro (ADMELOG) corrective regimen sliding scale   SubCutaneous every 6 hours  levothyroxine 50 MICROGram(s) Oral daily  lipid, fat emulsion (Fish Oil and Plant Based) 20% Infusion 0.5 Gm/kG/Day (20.83 mL/Hr) IV Continuous <Continuous>  losartan 25 milliGRAM(s) Oral daily  meropenem  IVPB 1000 milliGRAM(s) IV Intermittent every 8 hours  metoprolol tartrate 25 milliGRAM(s) Oral every 12 hours  octreotide  Injectable 100 MICROGram(s) IV Push every 8 hours  pantoprazole  Injectable 40 milliGRAM(s) IV Push every 12 hours  Parenteral Nutrition - Adult 1 Each (85 mL/Hr) TPN Continuous <Continuous>  venlafaxine XR. 300 milliGRAM(s) Oral daily    MEDICATIONS  (PRN):  acetaminophen     Tablet .. 650 milliGRAM(s) Oral every 6 hours PRN Mild Pain (1 - 3)  benzocaine/menthol Lozenge 2 Lozenge Oral every 4 hours PRN Sore Throat  dextrose Oral Gel 15 Gram(s) Oral once PRN Blood Glucose LESS THAN 70 milliGRAM(s)/deciliter  hydrALAZINE Injectable 10 milliGRAM(s) IV Push every 4 hours PRN SBP > 140  melatonin 5 milliGRAM(s) Oral at bedtime PRN Sleep      Allergies    penicillin (Angioedema)    Intolerances        Vital Signs Last 24 Hrs  T(C): 37 (24 Jul 2023 09:20), Max: 37.8 (23 Jul 2023 13:00)  T(F): 98.6 (24 Jul 2023 09:20), Max: 100 (23 Jul 2023 13:00)  HR: 76 (24 Jul 2023 09:00) (64 - 95)  BP: 148/69 (24 Jul 2023 09:00) (125/58 - 197/84)  BP(mean): 99 (24 Jul 2023 09:00) (83 - 127)  RR: 25 (24 Jul 2023 09:00) (25 - 39)  SpO2: 97% (24 Jul 2023 09:00) (92% - 99%)    Parameters below as of 24 Jul 2023 09:00  Patient On (Oxygen Delivery Method): room air        07-23 @ 07:01 - 07-24 @ 07:00  --------------------------------------------------------  IN: 2483.8 mL / OUT: 3345 mL / NET: -861.2 mL    07-24 @ 07:01 - 07-24 @ 10:16  --------------------------------------------------------  IN: 85 mL / OUT: 0 mL / NET: 85 mL        PHYSICAL EXAM:    General: ; in no acute distress  HEENT: MMM, conjunctiva and sclera clear  Gastrointestinal: Soft  non-distended; Normal bowel sounds; ileostomy  Skin: Warm and dry. No obvious rash    LABS:  ABG - ( 23 Jul 2023 08:05 )  pH, Arterial: 7.46  pH, Blood: x     /  pCO2: 33    /  pO2: 72    / HCO3: 24    / Base Excess: 0.3   /  SaO2: 97.1                CBC Full  -  ( 24 Jul 2023 05:30 )  WBC Count : 13.88 K/uL  RBC Count : 2.68 M/uL  Hemoglobin : 7.9 g/dL  Hematocrit : 25.2 %  Platelet Count - Automated : 309 K/uL  Mean Cell Volume : 94.0 fl  Mean Cell Hemoglobin : 29.5 pg  Mean Cell Hemoglobin Concentration : 31.3 gm/dL  Auto Neutrophil # : x  Auto Lymphocyte # : x  Auto Monocyte # : x  Auto Eosinophil # : x  Auto Basophil # : x  Auto Neutrophil % : x  Auto Lymphocyte % : x  Auto Monocyte % : x  Auto Eosinophil % : x  Auto Basophil % : x    07-24    135  |  106  |  21  ----------------------------<  96  4.3   |  21<L>  |  0.93    Ca    7.8<L>      24 Jul 2023 05:30  Phos  3.7     07-24  Mg     2.1     07-24    TPro  7.0  /  Alb  2.0<L>  /  TBili  5.7<H>  /  DBili  4.1<H>  /  AST  162<H>  /  ALT  136<H>  /  AlkPhos  112  07-24          Urinalysis Basic - ( 24 Jul 2023 05:30 )    Color: x / Appearance: x / SG: x / pH: x  Gluc: 96 mg/dL / Ketone: x  / Bili: x / Urobili: x   Blood: x / Protein: x / Nitrite: x   Leuk Esterase: x / RBC: x / WBC x   Sq Epi: x / Non Sq Epi: x / Bacteria: x                RADIOLOGY & ADDITIONAL STUDIES (The following images were personally reviewed):

## 2023-07-24 NOTE — PROGRESS NOTE ADULT - ASSESSMENT
78M h/o Crohn's disease, ileocecectomy, open appy, AFib/Aflutter s/p multiple DCCV p/w acute onset of abdominal pain on 6/23, found to have SBO.  S/p ex-lap, open TRE, resection of small bowell on 6/26. Course c/b pneumothorax s/p R pigtail placement on 6/27, RTOR on 6/28 s/p ileocolic resection with anastomosis, small bowel anastomosis and abdominal wall reconstruction and washout. 6/28 body fluid grow E. casseliflavus. 07/05 RTOR for surgical site dehiscence, was taken back emergently to OR for exlap, found to have anastomotic leak s/p exlap with washout and ileostomy/colostomy creation s/p imipenem/fluconazole for polymicrobial anastomotic leak/feculent peritonitis completed 7/11. Pt now with new fever and worsening leukocytosis started on javier/dapto 7/23. BCxs ngtd and UA negative. CT chest with atelectasis, findings not convincing for pneumonia. CT A/P with distended gallbladder with layering sludge and elevated bilirubin, transaminitis on labs c/f acalculous cholecystitis. Per primary team, as bilirubin is stable will not place C tube at this time.     Suggest:  -Would discontinue daptomycin and meropenem   -F/u blood cultures 7/23  -continue to trend temp, WBC and bilirubin/LFTs  -start imipenem 1g IV q8h   -consider US of RUQ or HIDA scan for definitive diagnosis     Team 2 will follow you.    Case d/w primary team.  Final recommendation pending attending note.    Nabila Mcfarland, Infectious Diseases PA  Please reach out for any questions 9 am-5pm. For evenings and weekends, please call the ID physician on call.  Work cell: [phone]

## 2023-07-24 NOTE — PROGRESS NOTE ADULT - SUBJECTIVE AND OBJECTIVE BOX
Interval Events: No acute events overnight    Patient seen and examined at bedside.      Allergies    penicillin (Angioedema)    Intolerances        Vital Signs Last 24 Hrs  T(C): 36.9 (24 Jul 2023 05:05), Max: 38.1 (23 Jul 2023 09:00)  T(F): 98.4 (24 Jul 2023 05:05), Max: 100.6 (23 Jul 2023 09:00)  HR: 77 (24 Jul 2023 07:00) (61 - 95)  BP: 134/62 (24 Jul 2023 07:00) (113/55 - 197/84)  BP(mean): 89 (24 Jul 2023 07:00) (79 - 127)  RR: 25 (24 Jul 2023 07:00) (21 - 39)  SpO2: 97% (24 Jul 2023 07:00) (92% - 99%)    Parameters below as of 24 Jul 2023 07:00  Patient On (Oxygen Delivery Method): nasal cannula  O2 Flow (L/min): 3      07-23 @ 07:01  -  07-24 @ 07:00  --------------------------------------------------------  IN: 2398.8 mL / OUT: 3345 mL / NET: -946.2 mL      07-23 @ 07:01  -  07-24 @ 07:00  --------------------------------------------------------  IN: 2398.8 mL / OUT: 3345 mL / NET: -946.2 mL        Physical Exam:     Gen: NAD well nourished  Neuro: A&OX3 No deficits  CV:RRR Reg s1s2 noM  Pulm: CTA b/l No w/r/r  Abd: Soft NT ND + BS  Ext: No C/C/E   Vasc: + DP b/l   Skin: no rashes noted  MSK: No joint swelling  Psych: No signs of anxiety or depression      LABS:  ABG - ( 23 Jul 2023 08:05 )  pH, Arterial: 7.46  pH, Blood: x     /  pCO2: 33    /  pO2: 72    / HCO3: 24    / Base Excess: 0.3   /  SaO2: 97.1                CBC Full  -  ( 24 Jul 2023 05:30 )  WBC Count : 13.88 K/uL  RBC Count : 2.68 M/uL  Hemoglobin : 7.9 g/dL  Hematocrit : 25.2 %  Platelet Count - Automated : 309 K/uL  Mean Cell Volume : 94.0 fl  Mean Cell Hemoglobin : 29.5 pg  Mean Cell Hemoglobin Concentration : 31.3 gm/dL  Auto Neutrophil # : x  Auto Lymphocyte # : x  Auto Monocyte # : x  Auto Eosinophil # : x  Auto Basophil # : x  Auto Neutrophil % : x  Auto Lymphocyte % : x  Auto Monocyte % : x  Auto Eosinophil % : x  Auto Basophil % : x    07-24    135  |  106  |  21  ----------------------------<  96  4.3   |  21<L>  |  0.93    Ca    7.8<L>      24 Jul 2023 05:30  Phos  3.7     07-24  Mg     2.1     07-24    TPro  7.0  /  Alb  2.0<L>  /  TBili  5.7<H>  /  DBili  4.1<H>  /  AST  162<H>  /  ALT  136<H>  /  AlkPhos  112  07-24          Urinalysis Basic - ( 24 Jul 2023 05:30 )    Color: x / Appearance: x / SG: x / pH: x  Gluc: 96 mg/dL / Ketone: x  / Bili: x / Urobili: x   Blood: x / Protein: x / Nitrite: x   Leuk Esterase: x / RBC: x / WBC x   Sq Epi: x / Non Sq Epi: x / Bacteria: x              RADIOLOGY & ADDITIONAL STUDIES (The following images were personally reviewed):          A/p: 78yMale Interval Events: No acute events overnight    Patient seen and examined at bedside. Awake, alert and oriented. ROS negative.       Allergies    penicillin (Angioedema)    Intolerances        Vital Signs Last 24 Hrs  T(C): 36.9 (24 Jul 2023 05:05), Max: 38.1 (23 Jul 2023 09:00)  T(F): 98.4 (24 Jul 2023 05:05), Max: 100.6 (23 Jul 2023 09:00)  HR: 77 (24 Jul 2023 07:00) (61 - 95)  BP: 134/62 (24 Jul 2023 07:00) (113/55 - 197/84)  BP(mean): 89 (24 Jul 2023 07:00) (79 - 127)  RR: 25 (24 Jul 2023 07:00) (21 - 39)  SpO2: 97% (24 Jul 2023 07:00) (92% - 99%)    Parameters below as of 24 Jul 2023 07:00  Patient On (Oxygen Delivery Method): nasal cannula  O2 Flow (L/min): 3      07-23 @ 07:01  -  07-24 @ 07:00  --------------------------------------------------------  IN: 2398.8 mL / OUT: 3345 mL / NET: -946.2 mL      07-23 @ 07:01  -  07-24 @ 07:00  --------------------------------------------------------  IN: 2398.8 mL / OUT: 3345 mL / NET: -946.2 mL        Physical Exam:     Gen: No acute distress   Neuro: A&OX3 No deficits  HENT: Scleral icterus   CV: Regular rate and rhythm, no murmrus/rubs, no peripheral edema   Pulm: Lung sounds clear bilaterally in the upper lobes, coarse crackles auscultated in the left posterior lobe   Abd: Soft, non-tender, non-distended   Ext: Warm, well-perfused    Vasc: +2 radial/pedal pulses bilaterally   Skin: juandice; no rashes noted  MSK: No joint swelling  Psych: No signs of anxiety or depression      LABS:  ABG - ( 23 Jul 2023 08:05 )  pH, Arterial: 7.46  pH, Blood: x     /  pCO2: 33    /  pO2: 72    / HCO3: 24    / Base Excess: 0.3   /  SaO2: 97.1        CBC Full  -  ( 24 Jul 2023 05:30 )  WBC Count : 13.88 K/uL  RBC Count : 2.68 M/uL  Hemoglobin : 7.9 g/dL  Hematocrit : 25.2 %  Platelet Count - Automated : 309 K/uL  Mean Cell Volume : 94.0 fl  Mean Cell Hemoglobin : 29.5 pg  Mean Cell Hemoglobin Concentration : 31.3 gm/dL      07-24    135  |  106  |  21  ----------------------------<  96  4.3   |  21<L>  |  0.93    Ca    7.8<L>      24 Jul 2023 05:30  Phos  3.7     07-24  Mg     2.1     07-24    TPro  7.0  /  Alb  2.0<L>  /  TBili  5.7<H>  /  DBili  4.1<H>  /  AST  162<H>  /  ALT  136<H>  /  AlkPhos  112  07-24          Urinalysis Basic - ( 24 Jul 2023 05:30 )    Color: x / Appearance: x / SG: x / pH: x  Gluc: 96 mg/dL / Ketone: x  / Bili: x / Urobili: x   Blood: x / Protein: x / Nitrite: x   Leuk Esterase: x / RBC: x / WBC x   Sq Epi: x / Non Sq Epi: x / Bacteria: x              RADIOLOGY & ADDITIONAL STUDIES (The following images were personally reviewed):          A/p: 78yMale Interval Events: No acute events overnight    Patient seen and examined at bedside. Awake, alert and oriented. ROS negative. Wound Vac being changed at bedside        Allergies    penicillin (Angioedema)    Intolerances        Vital Signs Last 24 Hrs  T(C): 36.9 (24 Jul 2023 05:05), Max: 38.1 (23 Jul 2023 09:00)  T(F): 98.4 (24 Jul 2023 05:05), Max: 100.6 (23 Jul 2023 09:00)  HR: 77 (24 Jul 2023 07:00) (61 - 95)  BP: 134/62 (24 Jul 2023 07:00) (113/55 - 197/84)  BP(mean): 89 (24 Jul 2023 07:00) (79 - 127)  RR: 25 (24 Jul 2023 07:00) (21 - 39)  SpO2: 97% (24 Jul 2023 07:00) (92% - 99%)    Parameters below as of 24 Jul 2023 07:00  Patient On (Oxygen Delivery Method): nasal cannula  O2 Flow (L/min): 3      07-23 @ 07:01  -  07-24 @ 07:00  --------------------------------------------------------  IN: 2398.8 mL / OUT: 3345 mL / NET: -946.2 mL      07-23 @ 07:01  -  07-24 @ 07:00  --------------------------------------------------------  IN: 2398.8 mL / OUT: 3345 mL / NET: -946.2 mL        Physical Exam:     Gen: No acute distress   Neuro: A&OX3 No deficits  HENT: Scleral icterus   CV: Regular rate and rhythm, no murmrus/rubs, no peripheral edema   Pulm: Lung sounds clear bilaterally in the upper lobes, coarse crackles auscultated in the left posterior lobe   Abd: Soft, non-tender, non-distended   Ext: Warm, well-perfused    Vasc: +2 radial/pedal pulses bilaterally   Skin: juandice; no rashes noted  MSK: No joint swelling  Psych: No signs of anxiety or depression      LABS:  ABG - ( 23 Jul 2023 08:05 )  pH, Arterial: 7.46  pH, Blood: x     /  pCO2: 33    /  pO2: 72    / HCO3: 24    / Base Excess: 0.3   /  SaO2: 97.1        CBC Full  -  ( 24 Jul 2023 05:30 )  WBC Count : 13.88 K/uL  RBC Count : 2.68 M/uL  Hemoglobin : 7.9 g/dL  Hematocrit : 25.2 %  Platelet Count - Automated : 309 K/uL  Mean Cell Volume : 94.0 fl  Mean Cell Hemoglobin : 29.5 pg  Mean Cell Hemoglobin Concentration : 31.3 gm/dL      07-24    135  |  106  |  21  ----------------------------<  96  4.3   |  21<L>  |  0.93    Ca    7.8<L>      24 Jul 2023 05:30  Phos  3.7     07-24  Mg     2.1     07-24    TPro  7.0  /  Alb  2.0<L>  /  TBili  5.7<H>  /  DBili  4.1<H>  /  AST  162<H>  /  ALT  136<H>  /  AlkPhos  112  07-24          Urinalysis Basic - ( 24 Jul 2023 05:30 )    Color: x / Appearance: x / SG: x / pH: x  Gluc: 96 mg/dL / Ketone: x  / Bili: x / Urobili: x   Blood: x / Protein: x / Nitrite: x   Leuk Esterase: x / RBC: x / WBC x   Sq Epi: x / Non Sq Epi: x / Bacteria: x

## 2023-07-24 NOTE — PROGRESS NOTE ADULT - SUBJECTIVE AND OBJECTIVE BOX
Awake and alert this am VS stable Afeb In RR Good BS ant No edema  WBCs down Hct decreasing  Await bili Cultures neg tus far  Discussed scan with surgery yesterday

## 2023-07-24 NOTE — PROGRESS NOTE ADULT - SUBJECTIVE AND OBJECTIVE BOX
SUBJECTIVE: Patient seen this morning during morning rounds. Wound vac holding suction.     MEDICATIONS  (STANDING):  aMIOdarone    Tablet 200 milliGRAM(s) Oral daily  amLODIPine   Tablet 5 milliGRAM(s) Oral daily  atorvastatin 20 milliGRAM(s) Oral at bedtime  chlorhexidine 2% Cloths 1 Application(s) Topical <User Schedule>  cholestyramine Powder (Sugar-Free) 4 Gram(s) Oral two times a day  DAPTOmycin IVPB 800 milliGRAM(s) IV Intermittent every 24 hours  dextrose 5%. 1000 milliLiter(s) (50 mL/Hr) IV Continuous <Continuous>  dextrose 5%. 1000 milliLiter(s) (100 mL/Hr) IV Continuous <Continuous>  dextrose 50% Injectable 25 Gram(s) IV Push once  dextrose 50% Injectable 12.5 Gram(s) IV Push once  dextrose 50% Injectable 25 Gram(s) IV Push once  glucagon  Injectable 1 milliGRAM(s) IntraMuscular once  heparin   Injectable 5000 Unit(s) SubCutaneous every 8 hours  insulin lispro (ADMELOG) corrective regimen sliding scale   SubCutaneous every 6 hours  levothyroxine 50 MICROGram(s) Oral daily  lipid, fat emulsion (Fish Oil and Plant Based) 20% Infusion 0.5 Gm/kG/Day (20.83 mL/Hr) IV Continuous <Continuous>  losartan 25 milliGRAM(s) Oral daily  meropenem  IVPB 1000 milliGRAM(s) IV Intermittent every 8 hours  metoprolol tartrate 25 milliGRAM(s) Oral every 12 hours  octreotide  Injectable 100 MICROGram(s) IV Push every 8 hours  pantoprazole  Injectable 40 milliGRAM(s) IV Push every 12 hours  Parenteral Nutrition - Adult 1 Each (85 mL/Hr) TPN Continuous <Continuous>  venlafaxine XR. 300 milliGRAM(s) Oral daily    MEDICATIONS  (PRN):  acetaminophen     Tablet .. 650 milliGRAM(s) Oral every 6 hours PRN Mild Pain (1 - 3)  benzocaine/menthol Lozenge 2 Lozenge Oral every 4 hours PRN Sore Throat  dextrose Oral Gel 15 Gram(s) Oral once PRN Blood Glucose LESS THAN 70 milliGRAM(s)/deciliter  hydrALAZINE Injectable 10 milliGRAM(s) IV Push every 4 hours PRN SBP > 140  melatonin 5 milliGRAM(s) Oral at bedtime PRN Sleep      Drips:     ICU Vital Signs Last 24 Hrs  T(C): 37 (24 Jul 2023 09:20), Max: 37.8 (23 Jul 2023 13:00)  T(F): 98.6 (24 Jul 2023 09:20), Max: 100 (23 Jul 2023 13:00)  HR: 77 (24 Jul 2023 08:00) (64 - 95)  BP: 146/67 (24 Jul 2023 08:00) (125/58 - 197/84)  BP(mean): 97 (24 Jul 2023 08:00) (83 - 127)  ABP: --  ABP(mean): --  RR: 34 (24 Jul 2023 08:00) (25 - 39)  SpO2: 97% (24 Jul 2023 08:00) (92% - 99%)    O2 Parameters below as of 24 Jul 2023 08:00  Patient On (Oxygen Delivery Method): nasal cannula  O2 Flow (L/min): 3        Physical exam  General: NAD, resting comfortably in bed  C/V: NSR  Pulm: Nonlabored breathing, no respiratory distress  Abd: soft, nondistended, midline laparotomy w/ vac in place w/ good suction, brown enteric output in cannister. Ostomy ppp w/ gas and brown output in pouch. Mucous fistula appreciated. R JOYCE w/ scant serous output, L JOYCE = w/ bilious output.  Extrem: WWP, no edema, SCDs in place          I&O's Summary    23 Jul 2023 07:01  -  24 Jul 2023 07:00  --------------------------------------------------------  IN: 2483.8 mL / OUT: 3345 mL / NET: -861.2 mL    24 Jul 2023 07:01  -  24 Jul 2023 09:22  --------------------------------------------------------  IN: 85 mL / OUT: 0 mL / NET: 85 mL        LABS:                        7.9    13.88 )-----------( 309      ( 24 Jul 2023 05:30 )             25.2     07-24    135  |  106  |  21  ----------------------------<  96  4.3   |  21<L>  |  0.93    Ca    7.8<L>      24 Jul 2023 05:30  Phos  3.7     07-24  Mg     2.1     07-24    TPro  7.0  /  Alb  2.0<L>  /  TBili  5.7<H>  /  DBili  4.1<H>  /  AST  162<H>  /  ALT  136<H>  /  AlkPhos  112  07-24      Urinalysis Basic - ( 24 Jul 2023 05:30 )    Color: x / Appearance: x / SG: x / pH: x  Gluc: 96 mg/dL / Ketone: x  / Bili: x / Urobili: x   Blood: x / Protein: x / Nitrite: x   Leuk Esterase: x / RBC: x / WBC x   Sq Epi: x / Non Sq Epi: x / Bacteria: x      CAPILLARY BLOOD GLUCOSE      POCT Blood Glucose.: 98 mg/dL (24 Jul 2023 05:58)  POCT Blood Glucose.: 128 mg/dL (23 Jul 2023 23:22)  POCT Blood Glucose.: 110 mg/dL (23 Jul 2023 18:14)  POCT Blood Glucose.: 138 mg/dL (23 Jul 2023 12:07)    LIVER FUNCTIONS - ( 24 Jul 2023 05:30 )  Alb: 2.0 g/dL / Pro: 7.0 g/dL / ALK PHOS: 112 U/L / ALT: 136 U/L / AST: 162 U/L / GGT: x             Cultures:Culture Results:   No growth at 12 hours (07-23 @ 09:36)  Culture Results:   No growth at 12 hours (07-23 @ 09:36)  Culture Results:   No growth at 1 day. (07-23 @ 04:21)  Culture Results:   No growth at 1 day. (07-23 @ 04:21)      CT Chest abdomen and pelvis (7/23/23): Trace oral contrast tracking along the right anterior peritoneal reflection. No evidence of drainable collection. Medial splenic infarct. Mild bilateral layering pleural effusions, left greater than right, with compressive atelectasis.  CT head (7/23/23): unremarkable

## 2023-07-24 NOTE — PROGRESS NOTE ADULT - ASSESSMENT
78M w/ Crohn's, AFib/Flutter s/p DCCVs on amiodarone, remote ileocectomy and open appy here for SBO vs Crohns flare, s/p NGT decompression and now s/p lap TRE converted to open TRE, SBR x 3, left in discontinuity with abthera vac on 6/27, RTOR for ileocolic resection, small bowel anastomosis, and abdominal wall closure on 6/28, and s/p IR aspiration of perihepatic fluid on 7/3, stepped down to telemetery on 7/4. wound dehisence on 7/5, RTOR exlap, washout, ileocolic resection with end ileostomy, blow hole colostomy, red rubber from ileostomy to small bowel anastomosis; vicryl bridging mesh on 7/5. Transferred to SICU post op for HD monitoring. Extubated 7/6.    78M w/ Crohn's, AFib/Flutter s/p DCCVs on amiodarone, remote ileocectomy and open appy here for SBO vs Crohns flare, s/p NGT decompression and now s/p lap TRE converted to open TRE, SBR x 3, left in discontinuity with abthera vac on 6/27, RTOR for ileocolic resection, small bowel anastomosis, and abdominal wall closure on 6/28, and s/p IR aspiration of perihepatic fluid on 7/3, stepped down to telemetery on 7/4. wound dehisence on 7/5, RTOR exlap, washout, ileocolic resection with end ileostomy, blow hole colostomy, red rubber from ileostomy to small bowel anastomosis; vicryl bridging mesh on 7/5. Transferred to SICU post op for HD monitoring. Extubated 7/6.    Neuro: Tylenol PRN for pain. Hx anxiety: Continue venlafaxine. Hold narcotics for drowsiness.   CV: MAP > 65. Hx of Afib/flutter: s/p DCCV, on PO amiodarone, metoprolol. Hx of HTN- continue home losartan and continue hydralazine. Starting amlodipine 5 daily. IV Hydralazine PRN for SBP < 140 increased to q4h. Hx of HLD: continue home rosuvastatin; Repeat TTE  (07/18) - PASP 64mmHg, EF 65-70%%. Appears euvolemic - no diuresis today  Pulm: Extubated 7/6 - now on RA/NC saturating well. s/p Chest tube by CT surg (6/27--7/3)   GI: NPO. TPN/lipids, wound dehisense -- RTOR on 7/5 for exlap, washout, ileocolic resection with end ileostomy, blow hole colostomy; vicryl bridging mesh explanted today and vac replaced - Continue Octreotide and cholestyramine. T bili elevated today with scleral icterus - RUQ duplex today.  : Voids.   ID: CT Scan abdom: continued ECF leak. CT Chest: Bilateral effusions ? PNA. Blood Cx sent 7/23. Started Meropenum 1g (7/23--) Dapto (7/23--) Perforated viscus, IR Cx 7/3: C. tertium, Lactobacillis. OR Cx 7/5 - rare yeast - Imipenem (6/30-7/12), Fluconazole (6/30 -7/12) OR Cx 6/28: Vanc-resistant/amp-sens (but allergic) enterococcus s/p CTX (6/26-6/30), Flagyl (6/26-6/30), Dapto (6/30-7/5).  Endo: ISS; Hx of hypothyroid: cont synthroid IV. Gout: holding home allopurionol  PPx: SCD, HSQ. LE duplex 7/13 neg.  Lines: PIV x 2, RUE PICC (6/30--), Victoria (7/8-7/10) // DC: R Chest tube for iatrogenic PTX (6/27--7/3), R TLC (06/26-30). R TLC (7/5 -7/12)  Wounds/Drains: midline incision; wound vac changes q48h with fistula opening; R JOYCE below ileostomy (D/C 7/17), L JOYCE at small bowel enterotomy repair.   PT/OT: OOB ambulating in [place]   Dispo: SICU   78M w/ Crohn's, AFib/Flutter s/p DCCVs on amiodarone, remote ileocectomy and open appy here for SBO vs Crohns flare, s/p NGT decompression and now s/p lap TRE converted to open TRE, SBR x 3, left in discontinuity with abthera vac on 6/27, RTOR for ileocolic resection, small bowel anastomosis, and abdominal wall closure on 6/28, and s/p IR aspiration of perihepatic fluid on 7/3, stepped down to telemetery on 7/4. wound dehisence on 7/5, RTOR exlap, washout, ileocolic resection with end ileostomy, blow hole colostomy, red rubber from ileostomy to small bowel anastomosis; vicryl bridging mesh on 7/5. Transferred to SICU post op for HD monitoring. Extubated 7/6.      Neuro: Tylenol PRN for pain. Hx anxiety: Continue venlafaxine. Hold narcotics for drowsiness.   CV: MAP > 65. Hx of Afib/flutter: s/p DCCV, on PO amiodarone, metoprolol. Hx of HTN- continue home losartan and continue hydralazine. Increase amlodipine to 10mg daily. IV Hydralazine PRN for SBP < 140 increased to q4h. Hx of HLD: continue home rosuvastatin; Repeat TTE  (07/18) - PASP 64mmHg, EF 65-70%%. Appears euvolemic - no diuresis today  Pulm: Extubated 7/6 - now on RA/NC saturating well. s/p Chest tube by CT surg (6/27--7/3)   GI: CLD. TPN/lipids, wound dehisense -- RTOR on 7/5 for exlap, washout, ileocolic resection with end ileostomy, blow hole colostomy; vicryl bridging mesh explanted today and vac replaced - Continue Octreotide and cholestyramine. T bili elevated, but stable  : Voids.   ID: CT Scan abdom: continued ECF leak/Gallbladder sludge. Bili stable. Holding on perc cony for now. CT Chest: Bilateral effusions. Blood Cx sent 7/23. Started Meropenum 1g (7/23--) Dapto (7/23--). ID formally consulted. Perforated viscus, IR Cx 7/3: C. tertium, Lactobacillis. OR Cx 7/5 - rare yeast - Imipenem (6/30-7/12), Fluconazole (6/30 -7/12) OR Cx 6/28: Vanc-resistant/amp-sens (but allergic) enterococcus s/p CTX (6/26-6/30), Flagyl (6/26-6/30), Dapto (6/30-7/5).  Endo: ISS; Hx of hypothyroid: cont synthroid IV. Gout: holding home allopurionol  PPx: SCD, HSQ. LE duplex 7/13 neg.  Lines: PIV x 2, RUE PICC (6/30--), Victoria (7/8-7/10) // DC: R Chest tube for iatrogenic PTX (6/27--7/3), R TLC (06/26-30). R TLC (7/5 -7/12)  Wounds/Drains: midline incision; wound vac changed today 7/24; R JOYCE below ileostomy (D/C 7/17), L JOYCE at small bowel enterotomy repair.   PT/OT: OOB ambulating in [place]   Dispo: SICU   78M w/ Crohn's, AFib/Flutter s/p DCCVs on amiodarone, remote ileocectomy and open appy here for SBO vs Crohns flare, s/p NGT decompression and now s/p lap TRE converted to open TRE, SBR x 3, left in discontinuity with abthera vac on 6/27, RTOR for ileocolic resection, small bowel anastomosis, and abdominal wall closure on 6/28, and s/p IR aspiration of perihepatic fluid on 7/3, stepped down to telemetery on 7/4. wound dehisence on 7/5, RTOR exlap, washout, ileocolic resection with end ileostomy, blow hole colostomy, red rubber from ileostomy to small bowel anastomosis; vicryl bridging mesh on 7/5. Transferred to SICU post op for HD monitoring. Extubated 7/6.      Neuro: Tylenol PRN for pain. Hx anxiety: Continue venlafaxine. Hold narcotics for drowsiness.   CV: MAP > 65. Hx of Afib/flutter: s/p DCCV, on PO amiodarone, metoprolol. Hx of HTN- continue home losartan and continue hydralazine. Increase amlodipine to 10mg daily. IV Hydralazine PRN for SBP < 140 increased to q4h. Hx of HLD: continue home rosuvastatin; Repeat TTE  (07/18) - PASP 64mmHg, EF 65-70%%. Appears euvolemic - no diuresis today  Pulm: Extubated 7/6 - now on RA/NC saturating well. s/p Chest tube by CT surg (6/27--7/3)   GI: CLD. TPN/lipids, wound dehisense -- RTOR on 7/5 for exlap, washout, ileocolic resection with end ileostomy, blow hole colostomy; vicryl bridging mesh explanted today and vac replaced - Continue Octreotide and cholestyramine. T bili elevated, but stable  : Voids.   ID: CT Scan abdom: continued ECF leak/Gallbladder sludge. Bili stable. Holding on perc cony for now. CT Chest: Bilateral effusions. Blood Cx sent 7/23. ID formally consulted. Antibiotics changed to Imipenem 1g q8. (7/24--)Perforated viscus, IR Cx 7/3: C. tertium, Lactobacillis. OR Cx 7/5 - rare yeast. //DC: Meropenum 1g (7/23--7/24) Dapto (7/23--7/24).Imipenem (6/30-7/12), Fluconazole (6/30 -7/12) OR Cx 6/28: Vanc-resistant/amp-sens (but allergic) enterococcus s/p CTX (6/26-6/30), Flagyl (6/26-6/30), Dapto (6/30-7/5).  Endo: ISS; Hx of hypothyroid: cont synthroid IV. Gout: holding home allopurionol  PPx: SCD, HSQ. LE duplex 7/13 neg.  Lines: PIV x 2, RUE PICC (6/30--), Victoria (7/8-7/10) // DC: R Chest tube for iatrogenic PTX (6/27--7/3), R TLC (06/26-30). R TLC (7/5 -7/12)  Wounds/Drains: midline incision; wound vac changed today 7/24; R JOYCE below ileostomy (D/C 7/17), L JOYCE at small bowel enterotomy repair.   PT/OT: OOB ambulating in [place]   Dispo: SICU

## 2023-07-24 NOTE — PROGRESS NOTE ADULT - SUBJECTIVE AND OBJECTIVE BOX
INTERVAL HPI/OVERNIGHT EVENTS: BCx - ngtd @ 12hrs, MRSA neg, Staph neg, CK 36, AMRITA    STATUS POST:    6/27: Laparoscopic lysis of adhesions, converted to open lysis of adhesions, SBR x 3, temporary abdominal closure, 5L cryst, 1L 5% alb, 3 pRBC, 1 FFP, 1 plts  6/28: ex lap, removal of abthera, ileocolic resection, small bowel anastamosis, , 1.6 crystalloid, 250 5%, 175 uop   7/3: IR Gendel drained perihepatic aspiration of serous fluid.   7/5: RTOR exlap, washout, ileocolic resection with end ileostomy, blow hole colostomy, fistula, red rubber from ileostomy to small bowel anastomosis; vicryl bridging mesh; R JOYCE below ileostomy, L JOYCE at small bowel enterotomy repair; 500 LR, 500 5% albumin, 3u PRBC, 2 FFP, 400 UOP,     SUBJECTIVE: Patient seen and examined at bedside. Alert and responsive, states pain is well-controlled, denies n/v, cp, sob.      aMIOdarone    Tablet 200 milliGRAM(s) Oral daily  amLODIPine   Tablet 5 milliGRAM(s) Oral daily  DAPTOmycin IVPB 800 milliGRAM(s) IV Intermittent every 24 hours  heparin   Injectable 5000 Unit(s) SubCutaneous every 8 hours  hydrALAZINE Injectable 10 milliGRAM(s) IV Push every 4 hours PRN  losartan 25 milliGRAM(s) Oral daily  meropenem  IVPB 1000 milliGRAM(s) IV Intermittent every 8 hours  metoprolol tartrate 25 milliGRAM(s) Oral every 12 hours      Vital Signs Last 24 Hrs  T(C): 36.9 (24 Jul 2023 05:05), Max: 38.1 (23 Jul 2023 09:00)  T(F): 98.4 (24 Jul 2023 05:05), Max: 100.6 (23 Jul 2023 09:00)  HR: 77 (24 Jul 2023 08:00) (61 - 95)  BP: 146/67 (24 Jul 2023 08:00) (113/55 - 197/84)  BP(mean): 97 (24 Jul 2023 08:00) (79 - 127)  RR: 34 (24 Jul 2023 08:00) (21 - 39)  SpO2: 97% (24 Jul 2023 08:00) (92% - 99%)    Parameters below as of 24 Jul 2023 08:00  Patient On (Oxygen Delivery Method): nasal cannula  O2 Flow (L/min): 3    I&O's Detail    23 Jul 2023 07:01  -  24 Jul 2023 07:00  --------------------------------------------------------  IN:    Fat Emulsion (Fish Oil &amp; Plant Based) 20% Infusion: 228.8 mL    IV PiggyBack: 100 mL    IV PiggyBack: 50 mL    IV PiggyBack: 150 mL    TPN (Total Parenteral Nutrition): 1955 mL  Total IN: 2483.8 mL    OUT:    Bulb (mL): 100 mL    Drain (mL): 10 mL    Fat Emulsion (Fish Oil &amp; Plant Based) 20% Infusion: 0 mL    Ileostomy (mL): 35 mL    VAC (Vacuum Assisted Closure) System (mL): 1500 mL    Voided (mL): 1700 mL  Total OUT: 3345 mL    Total NET: -861.2 mL      24 Jul 2023 07:01  -  24 Jul 2023 08:06  --------------------------------------------------------  IN:    TPN (Total Parenteral Nutrition): 85 mL  Total IN: 85 mL    OUT:  Total OUT: 0 mL    Total NET: 85 mL          General: NAD, resting comfortably in bed  C/V: NSR  Pulm: Nonlabored breathing, no respiratory distress  Abd: soft, minimally distended, nontender. Vac in place w/ good suction, ostomy ppp w/ output in pouch, isolated EC fistula w/ scant bilious output in pouch.  Extrem: WWP, no edema, SCDs in place      LABS:                        7.9    13.88 )-----------( 309      ( 24 Jul 2023 05:30 )             25.2     07-24    135  |  106  |  21  ----------------------------<  96  4.3   |  21<L>  |  0.93    Ca    7.8<L>      24 Jul 2023 05:30  Phos  3.7     07-24  Mg     2.1     07-24    TPro  7.0  /  Alb  2.0<L>  /  TBili  5.7<H>  /  DBili  4.1<H>  /  AST  162<H>  /  ALT  136<H>  /  AlkPhos  112  07-24      Urinalysis Basic - ( 24 Jul 2023 05:30 )    Color: x / Appearance: x / SG: x / pH: x  Gluc: 96 mg/dL / Ketone: x  / Bili: x / Urobili: x   Blood: x / Protein: x / Nitrite: x   Leuk Esterase: x / RBC: x / WBC x   Sq Epi: x / Non Sq Epi: x / Bacteria: x        RADIOLOGY & ADDITIONAL STUDIES:

## 2023-07-24 NOTE — PROGRESS NOTE ADULT - ASSESSMENT
78M S/P laparoscopic lysis of adhesions, converted to open lysis of adhesions, SBR x 3, temporary abdominal closure, S/P ex lap, removal of abthera, ileocolic resection, small bowel anastomosis c/b anastomotic leak resulting in wound dehiscence now POD8 RTOR exlap, washout, ileocolic resection with end ileostomy, blow hole colostomy, fistula, red rubber from ileostomy to small bowel anastomosis (removed); vicryl bridging mesh; R JOYCE below ileostomy, L JOYCE at small bowel enterotomy repair. Midline wound with vac in place, enteric content, bile and some blood noted on cannister. Wound vac changed, mesh removed. RUQ US 0.4 gallbladder wall, no evidence of portal or hepatic venous thrombosis. Fluid and sludge distended gallbladder. Patient noted to be more drowsy on 7/23, work up done, CT head unremarkable. Concerned for possible aspiration.     - Strict I&Os including fistula output    - Wound vac change today 7/24  - Plan discussed with Dr Peterson

## 2023-07-24 NOTE — PROGRESS NOTE ADULT - NS ATTEND AMEND GEN_ALL_CORE FT
Re-consulted for new fever on 7/23.  Last seen by ID on 7/8. Patient finished imipenem/fluconazole on 7/11 and since then off abx. In the past few days he has worsening leukocytosis as well as uptrending Tbili. He spiked 101.2 fever on 7/23 and WBC went up to 16. Denied CP, SOB, cough, n/v/abdominal pain. CT c/a/p showed mild b/l pleulral effusion, mid splenic iinfarct, gall bladder distention with sludge, and trace oral contrast in peritoneum. Patient is on empiric javier/dapto with downtrending leukocytosis. Suspect source of fever is due to acalculus cholecystitis. Stop javier/dapto.  Start imipenem 1g IV q8h to cover GI aman including enterococcus. Consider US abdomen or HIDA scan.     Team 2 will follow you.  Case d/w primary team.    Angie Frye MD, MS  Infectious Disease attending  work cell [phone]   For any questions during evening/weekend/holiday, please page ID on call

## 2023-07-24 NOTE — PROGRESS NOTE ADULT - SUBJECTIVE AND OBJECTIVE BOX
Weekend events noted  CT scan without drainable collection  Cultures so far are negative  White count trending down  Bilirubin stable  ?  Distended gallbladder on CT  AVSS  Decreased output from fistula    Keep nothing by mouth  VAC change today  ?  Percutaneous cholecystostomy  ?  Narrow antibiotic coverage

## 2023-07-24 NOTE — PROGRESS NOTE ADULT - SUBJECTIVE AND OBJECTIVE BOX
INFECTIOUS DISEASES CONSULT FOLLOW-UP NOTE    INTERVAL HPI/OVERNIGHT EVENTS:    Patient last seen by ID on 7/8 with recommendations to continue imipenem and fluconazole through 7/11 for treatment of anastomotic leak/feculent peritonitis s/p RTOR 7/5 which was completed. ID reconsulted for fever and worsening leukocytosis. Patient developed fever of 101.2 on 7/23 and leukocytosis 12 ->16. He has been off antibiotics since 7/11. Primary team obtained BCxs x 2 (ngtd), UA (-), MRSA/MSSA swab (-), CT chest showing atelectasis, CTH unremarkable, CT A/P with distended gallbladder with layering sludge. He has been started on Daptomycin and Meropenem which was approved by ID yesterday. Today, patient appears to be somewhat delirious- states he does not feel well but has no specific complaints. Denies fever, chills, cough, SOB, CP, abdominal pain.     ROS:   Constitutional, eyes, ENT, cardiovascular, respiratory, gastrointestinal, genitourinary, integumentary, neurological, psychiatric and heme/lymph are otherwise negative other than noted above       ANTIBIOTICS/RELEVANT:    MEDICATIONS  (STANDING):  aMIOdarone    Tablet 200 milliGRAM(s) Oral daily  atorvastatin 20 milliGRAM(s) Oral at bedtime  chlorhexidine 2% Cloths 1 Application(s) Topical <User Schedule>  cholestyramine Powder (Sugar-Free) 4 Gram(s) Oral two times a day  DAPTOmycin IVPB 800 milliGRAM(s) IV Intermittent every 24 hours  dextrose 5%. 1000 milliLiter(s) (50 mL/Hr) IV Continuous <Continuous>  dextrose 5%. 1000 milliLiter(s) (100 mL/Hr) IV Continuous <Continuous>  dextrose 50% Injectable 25 Gram(s) IV Push once  dextrose 50% Injectable 12.5 Gram(s) IV Push once  dextrose 50% Injectable 25 Gram(s) IV Push once  glucagon  Injectable 1 milliGRAM(s) IntraMuscular once  heparin   Injectable 5000 Unit(s) SubCutaneous every 8 hours  insulin lispro (ADMELOG) corrective regimen sliding scale   SubCutaneous every 6 hours  levothyroxine 50 MICROGram(s) Oral daily  lipid, fat emulsion (Fish Oil and Plant Based) 20% Infusion 0.5 Gm/kG/Day (20.83 mL/Hr) IV Continuous <Continuous>  losartan 25 milliGRAM(s) Oral daily  meropenem  IVPB 1000 milliGRAM(s) IV Intermittent every 8 hours  metoprolol tartrate 25 milliGRAM(s) Oral every 12 hours  octreotide  Injectable 100 MICROGram(s) IV Push every 8 hours  pantoprazole  Injectable 40 milliGRAM(s) IV Push every 12 hours  Parenteral Nutrition - Adult 1 Each (85 mL/Hr) TPN Continuous <Continuous>  Parenteral Nutrition - Adult 1 Each (85 mL/Hr) TPN Continuous <Continuous>  venlafaxine XR. 300 milliGRAM(s) Oral daily    MEDICATIONS  (PRN):  acetaminophen     Tablet .. 650 milliGRAM(s) Oral every 6 hours PRN Mild Pain (1 - 3)  benzocaine/menthol Lozenge 2 Lozenge Oral every 4 hours PRN Sore Throat  dextrose Oral Gel 15 Gram(s) Oral once PRN Blood Glucose LESS THAN 70 milliGRAM(s)/deciliter  hydrALAZINE Injectable 10 milliGRAM(s) IV Push every 4 hours PRN SBP > 140  melatonin 5 milliGRAM(s) Oral at bedtime PRN Sleep        Vital Signs Last 24 Hrs  T(C): 37 (24 Jul 2023 09:20), Max: 37.8 (23 Jul 2023 21:18)  T(F): 98.6 (24 Jul 2023 09:20), Max: 100 (23 Jul 2023 21:18)  HR: 78 (24 Jul 2023 13:00) (71 - 95)  BP: 128/62 (24 Jul 2023 13:00) (120/58 - 197/84)  BP(mean): 89 (24 Jul 2023 13:00) (83 - 127)  RR: 22 (24 Jul 2023 13:00) (22 - 39)  SpO2: 80% (24 Jul 2023 13:00) (80% - 99%)    Parameters below as of 24 Jul 2023 11:00  Patient On (Oxygen Delivery Method): room air        07-23-23 @ 07:01  -  07-24-23 @ 07:00  --------------------------------------------------------  IN: 2483.8 mL / OUT: 3345 mL / NET: -861.2 mL    07-24-23 @ 07:01  -  07-24-23 @ 13:15  --------------------------------------------------------  IN: 475 mL / OUT: 200 mL / NET: 275 mL      PHYSICAL EXAM:  Constitutional: alert, NAD  Eyes: the sclera and conjunctiva were normal.   ENT: the ears and nose were normal in appearance.   Neck: the appearance of the neck was normal and the neck was supple.   Pulmonary: no respiratory distress and lungs were clear to auscultation bilaterally.   Heart: heart rate was normal and rhythm regular, normal S1 and S2  Vascular:. there was no peripheral edema  Abdomen: normal bowel sounds, soft, non-tender, ileostomy bag without drainage. Fistula with wound vac noted.   Neurological: no focal deficits.   Psychiatric: the affect was normal        LABS:                        7.9    13.88 )-----------( 309      ( 24 Jul 2023 05:30 )             25.2     07-24    135  |  106  |  21  ----------------------------<  96  4.3   |  21<L>  |  0.93    Ca    7.8<L>      24 Jul 2023 05:30  Phos  3.7     07-24  Mg     2.1     07-24    TPro  7.0  /  Alb  2.0<L>  /  TBili  5.7<H>  /  DBili  4.1<H>  /  AST  162<H>  /  ALT  136<H>  /  AlkPhos  112  07-24      Urinalysis Basic - ( 24 Jul 2023 05:30 )    Color: x / Appearance: x / SG: x / pH: x  Gluc: 96 mg/dL / Ketone: x  / Bili: x / Urobili: x   Blood: x / Protein: x / Nitrite: x   Leuk Esterase: x / RBC: x / WBC x   Sq Epi: x / Non Sq Epi: x / Bacteria: x        MICROBIOLOGY:  reviewed     RADIOLOGY & ADDITIONAL STUDIES:  Reviewed

## 2023-07-24 NOTE — PROGRESS NOTE ADULT - ASSESSMENT
78M POD27 laparoscopic lysis of adhesions, converted to open lysis of adhesions, SBR x 3, temporary abdominal closure, POD26 ex lap, removal of abthera, ileocolic resection, small bowel anastomosis c/b anastomotic leak resulting in wound dehiscence now POD19 RTOR exlap, washout, ileocolic resection with end ileostomy, blow hole colostomy, fistula, red rubber from ileostomy to small bowel anastomosis; vicryl bridging mesh; R JOYCE below ileostomy, L JOYCE at small bowel enterotomy repair.    Vac change today w/ wound care nursing  f/u speech and swallow eval  f/u ID recs  Appreciate excellent SICU care  Surgery team 4c will continue to follow middle

## 2023-07-24 NOTE — PROGRESS NOTE ADULT - NS ATTEND AMEND GEN_ALL_CORE FT
Crohn's, AF, HTN s/p SBR with anastomotic leak with ileocolic resection, end ileostomy, blowhole colostomy and now fistulization  physical as above  bilirubin is stable  fever over weekend, back on empiric javier/daptomycin pending cultures  option of cholecystostomy being considered empirically  continue TPN  vac replaced by Dr. Knapp  increase amlodipine with the metoprolol and losartan  renal stable  decision making of high complexity

## 2023-07-25 NOTE — PROGRESS NOTE ADULT - ASSESSMENT
78M S/P laparoscopic lysis of adhesions, converted to open lysis of adhesions, SBR x 3, temporary abdominal closure, S/P ex lap, removal of abthera, ileocolic resection, small bowel anastomosis c/b anastomotic leak resulting in wound dehiscence now POD8 RTOR exlap, washout, ileocolic resection with end ileostomy, blow hole colostomy, fistula, red rubber from ileostomy to small bowel anastomosis (removed); vicryl bridging mesh; R JOYCE below ileostomy, L JOYCE at small bowel enterotomy repair. Midline wound with vac in place, enteric content, bile and some blood noted on cannister. Wound vac changed, mesh removed. RUQ US 0.4 gallbladder wall, no evidence of portal or hepatic venous thrombosis. Fluid and sludge distended gallbladder. Patient noted to be more drowsy on 7/23, work up done, CT head unremarkable. Doing well tolerating clears, wound vac holding suction.     - Strict I&Os including fistula output    - Continue TPN and clears   - Out of bed   - Continue antibiotics   - Plan discussed with Dr Peterson

## 2023-07-25 NOTE — PROGRESS NOTE ADULT - SUBJECTIVE AND OBJECTIVE BOX
INTERVAL HPI/OVERNIGHT EVENTS: AMRITA    STATUS POST:    6/27: Laparoscopic lysis of adhesions, converted to open lysis of adhesions, SBR x 3, temporary abdominal closure, 5L cryst, 1L 5% alb, 3 pRBC, 1 FFP, 1 plts  6/28: ex lap, removal of abthera, ileocolic resection, small bowel anastamosis, , 1.6 crystalloid, 250 5%, 175 uop   7/3: IR Gendel drained perihepatic aspiration of serous fluid.   7/5: RTOR exlap, washout, ileocolic resection with end ileostomy, blow hole colostomy, fistula, red rubber from ileostomy to small bowel anastomosis; vicryl bridging mesh; R JOYCE below ileostomy, L JOYCE at small bowel enterotomy repair; 500 LR, 500 5% albumin, 3u PRBC, 2 FFP, 400 UOP,     SUBJECTIVE: Patient seen and examined at bedside. In good spirits, able rhoda PO without n/v, pain well-controlled, denies cp, sob. No issues w/ vac overnight.      aMIOdarone    Tablet 200 milliGRAM(s) Oral daily  amLODIPine   Tablet 10 milliGRAM(s) Oral daily  heparin   Injectable 5000 Unit(s) SubCutaneous every 8 hours  hydrALAZINE Injectable 10 milliGRAM(s) IV Push every 4 hours PRN  imipenem/cilastatin  IVPB 1000 milliGRAM(s) IV Intermittent every 8 hours  losartan 25 milliGRAM(s) Oral daily  metoprolol tartrate 25 milliGRAM(s) Oral every 12 hours      Vital Signs Last 24 Hrs  T(C): 36.5 (25 Jul 2023 09:24), Max: 37.1 (24 Jul 2023 21:55)  T(F): 97.7 (25 Jul 2023 09:24), Max: 98.8 (24 Jul 2023 21:55)  HR: 81 (25 Jul 2023 09:00) (76 - 92)  BP: 145/65 (25 Jul 2023 09:00) (110/62 - 149/81)  BP(mean): 94 (25 Jul 2023 09:00) (79 - 107)  RR: 25 (25 Jul 2023 09:00) (19 - 36)  SpO2: 99% (25 Jul 2023 09:00) (94% - 100%)    Parameters below as of 25 Jul 2023 09:00  Patient On (Oxygen Delivery Method): room air      I&O's Detail    24 Jul 2023 07:01  -  25 Jul 2023 07:00  --------------------------------------------------------  IN:    Fat Emulsion (Fish Oil &amp; Plant Based) 20% Infusion: 270.5 mL    IV PiggyBack: 50 mL    IV PiggyBack: 750 mL    Oral Fluid: 240 mL    TPN (Total Parenteral Nutrition): 1955 mL  Total IN: 3265.5 mL    OUT:    Bulb (mL): 60 mL    Drain (mL): 20 mL    Drain (mL): 125 mL    Ileostomy (mL): 10 mL    VAC (Vacuum Assisted Closure) System (mL): 425 mL    Voided (mL): 1405 mL  Total OUT: 2045 mL    Total NET: 1220.5 mL      25 Jul 2023 07:01  -  25 Jul 2023 10:54  --------------------------------------------------------  IN:    TPN (Total Parenteral Nutrition): 255 mL  Total IN: 255 mL    OUT:    Voided (mL): 150 mL  Total OUT: 150 mL    Total NET: 105 mL          General: NAD, resting comfortably in bed  C/V: NSR  Pulm: Nonlabored breathing, no respiratory distress  Abd: soft, minimally distended, nontender. Vac in place w/ good suction w/ bilious output in cannister, ostomy ppp w/ output in pouch, isolated EC fistula w/ scant bilious output in pouch, some bilious matter in wound bed. Mucous fistula without output in pouch.  Extrem: WWP, no edema, SCDs in place    LABS:                        8.2    13.30 )-----------( 296      ( 25 Jul 2023 05:30 )             26.0     07-25    140  |  108  |  23  ----------------------------<  129<H>  5.0   |  24  |  0.81    Ca    7.7<L>      25 Jul 2023 05:30  Phos  3.4     07-25  Mg     2.1     07-25    TPro  7.0  /  Alb  1.9<L>  /  TBili  5.1<H>  /  DBili  3.5<H>  /  AST  165<H>  /  ALT  140<H>  /  AlkPhos  109  07-25      Urinalysis Basic - ( 25 Jul 2023 05:30 )    Color: x / Appearance: x / SG: x / pH: x  Gluc: 129 mg/dL / Ketone: x  / Bili: x / Urobili: x   Blood: x / Protein: x / Nitrite: x   Leuk Esterase: x / RBC: x / WBC x   Sq Epi: x / Non Sq Epi: x / Bacteria: x        RADIOLOGY & ADDITIONAL STUDIES:

## 2023-07-25 NOTE — PROGRESS NOTE ADULT - ASSESSMENT
78M w/ Crohn's, AFib/Flutter s/p DCCVs on amiodarone, remote ileocectomy and open appy here for SBO vs Crohns flare, s/p NGT decompression and now s/p lap TRE converted to open TRE, SBR x 3, left in discontinuity with abthera vac on 6/27, RTOR for ileocolic resection, small bowel anastomosis, and abdominal wall closure on 6/28, and s/p IR aspiration of perihepatic fluid on 7/3, stepped down to telemetery on 7/4. wound dehisence on 7/5, RTOR exlap, washout, ileocolic resection with end ileostomy, blow hole colostomy, red rubber from ileostomy to small bowel anastomosis; vicryl bridging mesh on 7/5. Transferred to SICU post op for HD monitoring. Extubated 7/6.    Neuro: Tylenol PRN for pain. Hx anxiety: Continue home venlafaxine  CV: MAP > 65. Hx of Afib/flutter: s/p DCCV, on PO amiodarone, metoprolol. Hx of HTN- continue home losartan. Continue new amlodipine 10 daily. IV Hydralazine PRN for SBP < 140 q4h. Hx of HLD: continue home rosuvastatin; Repeat TTE  (07/18) - PASP 64mmHg, EF 65-70%%. Previously total body overloaded - now euvolemic  Pulm: Extubated 7/6 - now on RA/NC saturating well. s/p Chest tube by CT surg (6/27--7/3)   GI: Continue CLD. TPN/lipids, wound dehisense -- RTOR on 7/5 for exlap, washout, ileocolic resection with end ileostomy, blow hole colostomy; vicryl bridging mesh explanted and vac replaced - Continue Octreotide and cholestyramine. Total bilirubin persistently downtrending.   : Voids.   ID: CT Scan A/P: continued ECF leak. Blood Cx sent 7/23 - NGTD. Started Meropenum 1g (7/23--) Dapto (7/23--) Perforated viscus, IR Cx 7/3: C. tertium, Lactobacillis. OR Cx 7/5 - rare yeast - Imipenem (6/30-7/12), Fluconazole (6/30 -7/12) OR Cx 6/28: Vanc-resistant/amp-sens (but allergic) enterococcus s/p CTX (6/26-6/30), Flagyl (6/26-6/30), Dapto (6/30-7/5).  Endo: ISS; Hx of hypothyroid: cont synthroid IV. Gout: holding home allopurionol  PPx: SCD, HSQ. LE duplex 7/13 neg.  Lines: PIV x 2, RUE PICC (6/30--), Victoria (7/8-7/10) // DC: R Chest tube for iatrogenic PTX (6/27--7/3), R TLC (06/26-30). R TLC (7/5 -7/12)  Wounds/Drains: midline incision; wound vac changes q48h with fistula opening; R JOYCE below ileostomy (D/C 7/17), L JOYCE at small bowel enterotomy repair - to LIWS.  PT/OT: OOB ambulating in castrejon   Dispo: SICU

## 2023-07-25 NOTE — PROGRESS NOTE ADULT - SUBJECTIVE AND OBJECTIVE BOX
INFECTIOUS DISEASES CONSULT FOLLOW-UP NOTE    INTERVAL HPI/OVERNIGHT EVENTS:    Patient seen and examined at bedside. AMRITA. Afebrile. States he is feeling much better today and that "today is the best day so far." Denies complaints       ROS:   Constitutional, eyes, ENT, cardiovascular, respiratory, gastrointestinal, genitourinary, integumentary, neurological, psychiatric and heme/lymph are otherwise negative other than noted above       ANTIBIOTICS/RELEVANT:    MEDICATIONS  (STANDING):  aMIOdarone    Tablet 200 milliGRAM(s) Oral daily  amLODIPine   Tablet 10 milliGRAM(s) Oral daily  atorvastatin 20 milliGRAM(s) Oral at bedtime  chlorhexidine 2% Cloths 1 Application(s) Topical <User Schedule>  cholestyramine Powder (Sugar-Free) 4 Gram(s) Oral two times a day  dextrose 5%. 1000 milliLiter(s) (50 mL/Hr) IV Continuous <Continuous>  dextrose 5%. 1000 milliLiter(s) (100 mL/Hr) IV Continuous <Continuous>  dextrose 50% Injectable 25 Gram(s) IV Push once  dextrose 50% Injectable 12.5 Gram(s) IV Push once  dextrose 50% Injectable 25 Gram(s) IV Push once  glucagon  Injectable 1 milliGRAM(s) IntraMuscular once  heparin   Injectable 5000 Unit(s) SubCutaneous every 8 hours  imipenem/cilastatin  IVPB 1000 milliGRAM(s) IV Intermittent every 8 hours  insulin lispro (ADMELOG) corrective regimen sliding scale   SubCutaneous every 6 hours  levothyroxine 50 MICROGram(s) Oral daily  lipid, fat emulsion (Fish Oil and Plant Based) 20% Infusion 0.5 Gm/kG/Day (20.83 mL/Hr) IV Continuous <Continuous>  losartan 25 milliGRAM(s) Oral daily  metoprolol tartrate 25 milliGRAM(s) Oral every 12 hours  octreotide  Injectable 100 MICROGram(s) IV Push every 8 hours  pantoprazole  Injectable 40 milliGRAM(s) IV Push every 12 hours  Parenteral Nutrition - Adult 1 Each (85 mL/Hr) TPN Continuous <Continuous>  venlafaxine XR. 300 milliGRAM(s) Oral daily    MEDICATIONS  (PRN):  acetaminophen     Tablet .. 650 milliGRAM(s) Oral every 6 hours PRN Mild Pain (1 - 3)  benzocaine/menthol Lozenge 2 Lozenge Oral every 4 hours PRN Sore Throat  dextrose Oral Gel 15 Gram(s) Oral once PRN Blood Glucose LESS THAN 70 milliGRAM(s)/deciliter  hydrALAZINE Injectable 10 milliGRAM(s) IV Push every 4 hours PRN SBP > 140  melatonin 5 milliGRAM(s) Oral at bedtime PRN Sleep        Vital Signs Last 24 Hrs  T(C): 36.5 (25 Jul 2023 09:24), Max: 37.1 (24 Jul 2023 21:55)  T(F): 97.7 (25 Jul 2023 09:24), Max: 98.8 (24 Jul 2023 21:55)  HR: 82 (25 Jul 2023 08:00) (76 - 92)  BP: 132/60 (25 Jul 2023 08:00) (110/62 - 163/73)  BP(mean): 87 (25 Jul 2023 08:00) (79 - 107)  RR: 20 (25 Jul 2023 08:00) (19 - 36)  SpO2: 95% (25 Jul 2023 08:00) (94% - 100%)    Parameters below as of 25 Jul 2023 08:00  Patient On (Oxygen Delivery Method): room air        07-24-23 @ 07:01  -  07-25-23 @ 07:00  --------------------------------------------------------  IN: 3265.5 mL / OUT: 2045 mL / NET: 1220.5 mL    07-25-23 @ 07:01  -  07-25-23 @ 09:38  --------------------------------------------------------  IN: 85 mL / OUT: 125 mL / NET: -40 mL      PHYSICAL EXAM:  Constitutional: alert, NAD  Eyes: the sclera and conjunctiva were normal.   ENT: the ears and nose were normal in appearance.   Neck: the appearance of the neck was normal and the neck was supple.   Pulmonary: no respiratory distress and lungs were clear to auscultation bilaterally.   Heart: heart rate was normal and rhythm regular, normal S1 and S2  Vascular:. there was no peripheral edema  Abdomen: normal bowel sounds, soft, non-tender, ileostomy bag with minimal serous output. Fistula with wound vac noted.   Neurological: no focal deficits.   Psychiatric: the affect was normal        LABS:                        8.2    13.30 )-----------( 296      ( 25 Jul 2023 05:30 )             26.0     07-25    140  |  108  |  23  ----------------------------<  129<H>  5.0   |  24  |  0.81    Ca    7.7<L>      25 Jul 2023 05:30  Phos  3.4     07-25  Mg     2.1     07-25    TPro  7.0  /  Alb  1.9<L>  /  TBili  5.1<H>  /  DBili  3.5<H>  /  AST  165<H>  /  ALT  140<H>  /  AlkPhos  109  07-25      Urinalysis Basic - ( 25 Jul 2023 05:30 )    Color: x / Appearance: x / SG: x / pH: x  Gluc: 129 mg/dL / Ketone: x  / Bili: x / Urobili: x   Blood: x / Protein: x / Nitrite: x   Leuk Esterase: x / RBC: x / WBC x   Sq Epi: x / Non Sq Epi: x / Bacteria: x        MICROBIOLOGY:  reviewed     RADIOLOGY & ADDITIONAL STUDIES:  Reviewed

## 2023-07-25 NOTE — PROGRESS NOTE ADULT - ATTENDING COMMENTS
Crohn's, AF s/p SBR with anastomotic leak, ileocecal resection, blowhole colostomy, ileostomy  physical as above  bilirubin decreasing  continue imipenem  TPN continues  continue amiodarone  BP better on higher amlodipine with losartan and metoprolol  decision making of high complexity

## 2023-07-25 NOTE — PROGRESS NOTE ADULT - SUBJECTIVE AND OBJECTIVE BOX
ON: [facility]      SUBJECTIVE: Patient seen and examined at bedside this am by ICU team. No acute events noted overnight. Patient reports he feels well this AM. Denies nausea or emesis. States he has been ambulating in the hallway with PT.        MEDICATIONS  (STANDING):  aMIOdarone    Tablet 200 milliGRAM(s) Oral daily  amLODIPine   Tablet 10 milliGRAM(s) Oral daily  atorvastatin 20 milliGRAM(s) Oral at bedtime  chlorhexidine 2% Cloths 1 Application(s) Topical <User Schedule>  cholestyramine Powder (Sugar-Free) 4 Gram(s) Oral two times a day  dextrose 5%. 1000 milliLiter(s) (50 mL/Hr) IV Continuous <Continuous>  dextrose 5%. 1000 milliLiter(s) (100 mL/Hr) IV Continuous <Continuous>  dextrose 50% Injectable 25 Gram(s) IV Push once  dextrose 50% Injectable 12.5 Gram(s) IV Push once  dextrose 50% Injectable 25 Gram(s) IV Push once  glucagon  Injectable 1 milliGRAM(s) IntraMuscular once  heparin   Injectable 5000 Unit(s) SubCutaneous every 8 hours  imipenem/cilastatin  IVPB 1000 milliGRAM(s) IV Intermittent every 8 hours  insulin lispro (ADMELOG) corrective regimen sliding scale   SubCutaneous every 6 hours  levothyroxine 50 MICROGram(s) Oral daily  lipid, fat emulsion (Fish Oil and Plant Based) 20% Infusion 0.5 Gm/kG/Day (20.83 mL/Hr) IV Continuous <Continuous>  losartan 25 milliGRAM(s) Oral daily  metoprolol tartrate 25 milliGRAM(s) Oral every 12 hours  octreotide  Injectable 100 MICROGram(s) IV Push every 8 hours  pantoprazole  Injectable 40 milliGRAM(s) IV Push every 12 hours  Parenteral Nutrition - Adult 1 Each (85 mL/Hr) TPN Continuous <Continuous>  venlafaxine XR. 300 milliGRAM(s) Oral daily    MEDICATIONS  (PRN):  acetaminophen     Tablet .. 650 milliGRAM(s) Oral every 6 hours PRN Mild Pain (1 - 3)  benzocaine/menthol Lozenge 2 Lozenge Oral every 4 hours PRN Sore Throat  dextrose Oral Gel 15 Gram(s) Oral once PRN Blood Glucose LESS THAN 70 milliGRAM(s)/deciliter  hydrALAZINE Injectable 10 milliGRAM(s) IV Push every 4 hours PRN SBP > 140  melatonin 5 milliGRAM(s) Oral at bedtime PRN Sleep    Drips: TPN    ICU Vital Signs Last 24 Hrs  T(C): 36.6 (25 Jul 2023 05:05), Max: 37.1 (24 Jul 2023 21:55)  T(F): 97.8 (25 Jul 2023 05:05), Max: 98.8 (24 Jul 2023 21:55)  HR: 79 (25 Jul 2023 07:00) (76 - 92)  BP: 127/64 (25 Jul 2023 07:00) (110/62 - 163/73)  BP(mean): 83 (25 Jul 2023 07:00) (79 - 107)  RR: 26 (25 Jul 2023 07:00) (19 - 36)  SpO2: 95% (25 Jul 2023 07:00) (94% - 100%)    O2 Parameters below as of 25 Jul 2023 07:00  Patient On (Oxygen Delivery Method): room air            Physical Exam:  General: Resting comfortably in bed, NAD  HEENT: NC/AT, EOMI, PERRLA, normal hearing, no oral lesions, neck supple w/o LAD, no JVD  Pulmonary: Nonlabored breathing, no respiratory distress, CTA-B  Cardiovascular: NSR, no murmurs  Abdominal: soft, nondistended, nontender. Midline vac in place with bilious output noted in fistula. JOYCE x1 noted to be bilious. ostmoy in RUQ with minimal serous output.  Extremities: WWP, 5/5 strength x 4, no clubbing/cyanosis/edema  Neuro: A/O x3, CNs II-XII grossly intact, normal motor/sensation, no focal deficits  Pulses: palpable distal pulses        I&O's Summary    24 Jul 2023 07:01  -  25 Jul 2023 07:00  --------------------------------------------------------  IN: 3025.5 mL / OUT: 1770 mL / NET: 1255.5 mL        LABS:                        8.2    13.30 )-----------( 296      ( 25 Jul 2023 05:30 )             26.0     07-25    140  |  108  |  23  ----------------------------<  129<H>  5.0   |  24  |  0.81    Ca    7.7<L>      25 Jul 2023 05:30  Phos  3.4     07-25  Mg     2.1     07-25    TPro  7.0  /  Alb  1.9<L>  /  TBili  5.1<H>  /  DBili  3.5<H>  /  AST  165<H>  /  ALT  140<H>  /  AlkPhos  109  07-25      Urinalysis Basic - ( 25 Jul 2023 05:30 )    Color: x / Appearance: x / SG: x / pH: x  Gluc: 129 mg/dL / Ketone: x  / Bili: x / Urobili: x   Blood: x / Protein: x / Nitrite: x   Leuk Esterase: x / RBC: x / WBC x   Sq Epi: x / Non Sq Epi: x / Bacteria: x      CAPILLARY BLOOD GLUCOSE      POCT Blood Glucose.: 126 mg/dL (24 Jul 2023 18:02)  POCT Blood Glucose.: 142 mg/dL (24 Jul 2023 11:49)    LIVER FUNCTIONS - ( 25 Jul 2023 05:30 )  Alb: 1.9 g/dL / Pro: 7.0 g/dL / ALK PHOS: 109 U/L / ALT: 140 U/L / AST: 165 U/L / GGT: x             Cultures:Culture Results:   No growth at 1 day. (07-23 @ 09:36)  Culture Results:   No growth at 1 day. (07-23 @ 09:36)      RADIOLOGY & ADDITIONAL STUDIES:

## 2023-07-25 NOTE — PROGRESS NOTE ADULT - SUBJECTIVE AND OBJECTIVE BOX
Pt seen and examined   no complaints  out of bed    REVIEW OF SYSTEMS:  Constitutional: No fever, weight loss or fatigue  Cardiovascular: No chest pain, palpitations, dizziness or leg swelling  Gastrointestinal: No abdominal or epigastric pain. No nausea, vomiting  Skin: No itching, burning, rashes or lesions       MEDICATIONS:  MEDICATIONS  (STANDING):  aMIOdarone    Tablet 200 milliGRAM(s) Oral daily  amLODIPine   Tablet 10 milliGRAM(s) Oral daily  atorvastatin 20 milliGRAM(s) Oral at bedtime  chlorhexidine 2% Cloths 1 Application(s) Topical <User Schedule>  cholestyramine Powder (Sugar-Free) 4 Gram(s) Oral two times a day  dextrose 5%. 1000 milliLiter(s) (50 mL/Hr) IV Continuous <Continuous>  dextrose 5%. 1000 milliLiter(s) (100 mL/Hr) IV Continuous <Continuous>  dextrose 50% Injectable 25 Gram(s) IV Push once  dextrose 50% Injectable 12.5 Gram(s) IV Push once  dextrose 50% Injectable 25 Gram(s) IV Push once  glucagon  Injectable 1 milliGRAM(s) IntraMuscular once  heparin   Injectable 5000 Unit(s) SubCutaneous every 8 hours  imipenem/cilastatin  IVPB 1000 milliGRAM(s) IV Intermittent every 8 hours  insulin lispro (ADMELOG) corrective regimen sliding scale   SubCutaneous every 6 hours  levothyroxine 50 MICROGram(s) Oral daily  lipid, fat emulsion (Fish Oil and Plant Based) 20% Infusion 0.5 Gm/kG/Day (20.83 mL/Hr) IV Continuous <Continuous>  losartan 25 milliGRAM(s) Oral daily  metoprolol tartrate 25 milliGRAM(s) Oral every 12 hours  octreotide  Injectable 100 MICROGram(s) IV Push every 8 hours  pantoprazole  Injectable 40 milliGRAM(s) IV Push every 12 hours  Parenteral Nutrition - Adult 1 Each (85 mL/Hr) TPN Continuous <Continuous>  Parenteral Nutrition - Adult 1 Each (96 mL/Hr) TPN Continuous <Continuous>  venlafaxine XR. 300 milliGRAM(s) Oral daily    MEDICATIONS  (PRN):  acetaminophen     Tablet .. 650 milliGRAM(s) Oral every 6 hours PRN Mild Pain (1 - 3)  benzocaine/menthol Lozenge 2 Lozenge Oral every 4 hours PRN Sore Throat  dextrose Oral Gel 15 Gram(s) Oral once PRN Blood Glucose LESS THAN 70 milliGRAM(s)/deciliter  hydrALAZINE Injectable 10 milliGRAM(s) IV Push every 4 hours PRN SBP > 140  melatonin 5 milliGRAM(s) Oral at bedtime PRN Sleep      Allergies    penicillin (Angioedema)    Intolerances        Vital Signs Last 24 Hrs  T(C): 36.5 (25 Jul 2023 09:24), Max: 37.1 (24 Jul 2023 21:55)  T(F): 97.7 (25 Jul 2023 09:24), Max: 98.8 (24 Jul 2023 21:55)  HR: 79 (25 Jul 2023 12:00) (76 - 92)  BP: 145/64 (25 Jul 2023 12:00) (110/62 - 149/81)  BP(mean): 92 (25 Jul 2023 12:00) (79 - 107)  RR: 33 (25 Jul 2023 12:00) (19 - 36)  SpO2: 95% (25 Jul 2023 12:00) (92% - 100%)    Parameters below as of 25 Jul 2023 12:00  Patient On (Oxygen Delivery Method): room air        07-24 @ 07:01 - 07-25 @ 07:00  --------------------------------------------------------  IN: 3265.5 mL / OUT: 2045 mL / NET: 1220.5 mL    07-25 @ 07:01 - 07-25 @ 12:35  --------------------------------------------------------  IN: 425 mL / OUT: 250 mL / NET: 175 mL        PHYSICAL EXAM:    General: in no acute distress  HEENT: MMM, conjunctiva and sclera clear  Gastrointestinal: Soft   non-distended; Normal bowel sounds; ileostomy, drain, dressings  Skin: Warm and dry. No obvious rash    LABS:      CBC Full  -  ( 25 Jul 2023 05:30 )  WBC Count : 13.30 K/uL  RBC Count : 2.73 M/uL  Hemoglobin : 8.2 g/dL  Hematocrit : 26.0 %  Platelet Count - Automated : 296 K/uL  Mean Cell Volume : 95.2 fl  Mean Cell Hemoglobin : 30.0 pg  Mean Cell Hemoglobin Concentration : 31.5 gm/dL  Auto Neutrophil # : x  Auto Lymphocyte # : x  Auto Monocyte # : x  Auto Eosinophil # : x  Auto Basophil # : x  Auto Neutrophil % : x  Auto Lymphocyte % : x  Auto Monocyte % : x  Auto Eosinophil % : x  Auto Basophil % : x    07-25    140  |  108  |  23  ----------------------------<  129<H>  5.0   |  24  |  0.81    Ca    7.7<L>      25 Jul 2023 05:30  Phos  3.4     07-25  Mg     2.1     07-25    TPro  7.0  /  Alb  1.9<L>  /  TBili  5.1<H>  /  DBili  3.5<H>  /  AST  165<H>  /  ALT  140<H>  /  AlkPhos  109  07-25          Urinalysis Basic - ( 25 Jul 2023 05:30 )    Color: x / Appearance: x / SG: x / pH: x  Gluc: 129 mg/dL / Ketone: x  / Bili: x / Urobili: x   Blood: x / Protein: x / Nitrite: x   Leuk Esterase: x / RBC: x / WBC x   Sq Epi: x / Non Sq Epi: x / Bacteria: x                RADIOLOGY & ADDITIONAL STUDIES (The following images were personally reviewed):

## 2023-07-25 NOTE — CHART NOTE - NSCHARTNOTEFT_GEN_A_CORE
Admitting Diagnosis:   Patient is a 78y old  Male who presents with a chief complaint of SBO (25 Jul 2023 09:35)      PAST MEDICAL & SURGICAL HISTORY:  Essential hypertension  Hypertension      Atrial fibrillation  s/p cardioversion 2010 and 2014  Pt. reports 4 DCCV  Now on Amiodarone 200mg PO bid and Eliquis 5mg PO bid  Last DCCV 4 yrs ago at [facility]      Crohn's disease  s/p partial resection of ileum      Hyperlipidemia      Hypothyroidism      History of depression  On Venlafaxine ER 150mg PO bid      H/O knee surgery      History of cataract surgery          Current Nutrition Order:   TPN- 123g AA + 455 dextrose + 250ml SMOFlipids; provides 2539kcals, 29.4kcals/kg IBW, 1.42g protein/kg, GIR 3.09  Clear liquid diet    PO Intake: Good (%) [ X  ]  Fair (50-75%) [   ] Poor (<25%) [   ]    GI Issues:     Pain:    Skin Integrity:    Labs:   07-25    140  |  108  |  23  ----------------------------<  129<H>  5.0   |  24  |  0.81    Ca    7.7<L>      25 Jul 2023 05:30  Phos  3.4     07-25  Mg     2.1     07-25    TPro  7.0  /  Alb  1.9<L>  /  TBili  5.1<H>  /  DBili  3.5<H>  /  AST  165<H>  /  ALT  140<H>  /  AlkPhos  109  07-25    CAPILLARY BLOOD GLUCOSE      POCT Blood Glucose.: 140 mg/dL (25 Jul 2023 00:29)  POCT Blood Glucose.: 126 mg/dL (24 Jul 2023 18:02)  POCT Blood Glucose.: 142 mg/dL (24 Jul 2023 11:49)      Medications:  MEDICATIONS  (STANDING):  aMIOdarone    Tablet 200 milliGRAM(s) Oral daily  amLODIPine   Tablet 10 milliGRAM(s) Oral daily  atorvastatin 20 milliGRAM(s) Oral at bedtime  chlorhexidine 2% Cloths 1 Application(s) Topical <User Schedule>  cholestyramine Powder (Sugar-Free) 4 Gram(s) Oral two times a day  dextrose 5%. 1000 milliLiter(s) (50 mL/Hr) IV Continuous <Continuous>  dextrose 5%. 1000 milliLiter(s) (100 mL/Hr) IV Continuous <Continuous>  dextrose 50% Injectable 25 Gram(s) IV Push once  dextrose 50% Injectable 12.5 Gram(s) IV Push once  dextrose 50% Injectable 25 Gram(s) IV Push once  glucagon  Injectable 1 milliGRAM(s) IntraMuscular once  heparin   Injectable 5000 Unit(s) SubCutaneous every 8 hours  imipenem/cilastatin  IVPB 1000 milliGRAM(s) IV Intermittent every 8 hours  insulin lispro (ADMELOG) corrective regimen sliding scale   SubCutaneous every 6 hours  levothyroxine 50 MICROGram(s) Oral daily  lipid, fat emulsion (Fish Oil and Plant Based) 20% Infusion 0.5 Gm/kG/Day (20.83 mL/Hr) IV Continuous <Continuous>  losartan 25 milliGRAM(s) Oral daily  metoprolol tartrate 25 milliGRAM(s) Oral every 12 hours  octreotide  Injectable 100 MICROGram(s) IV Push every 8 hours  pantoprazole  Injectable 40 milliGRAM(s) IV Push every 12 hours  Parenteral Nutrition - Adult 1 Each (85 mL/Hr) TPN Continuous <Continuous>  venlafaxine XR. 300 milliGRAM(s) Oral daily    MEDICATIONS  (PRN):  acetaminophen     Tablet .. 650 milliGRAM(s) Oral every 6 hours PRN Mild Pain (1 - 3)  benzocaine/menthol Lozenge 2 Lozenge Oral every 4 hours PRN Sore Throat  dextrose Oral Gel 15 Gram(s) Oral once PRN Blood Glucose LESS THAN 70 milliGRAM(s)/deciliter  hydrALAZINE Injectable 10 milliGRAM(s) IV Push every 4 hours PRN SBP > 140  melatonin 5 milliGRAM(s) Oral at bedtime PRN Sleep      Weight: 101kg [5 July 2023]  Daily   99.9kg [9 July 2023]  Daily 102.1kg [10 July 2023]    Weight Change:     IBW: 86.4kg   % IBW: 118.2% IBW    Estimated energy needs:     Subjective:     Previous Nutrition Diagnosis:    Active [   ]  Resolved [   ]    If resolved, new PES:     Goal:    Recommendations:    Education:     Risk Level: High [   ] Moderate [   ] Low [   ] Admitting Diagnosis:   Patient is a 78y old  Male who presents with a chief complaint of SBO (25 Jul 2023 09:35)      PAST MEDICAL & SURGICAL HISTORY:  Essential hypertension  Hypertension      Atrial fibrillation  s/p cardioversion 2010 and 2014  Pt. reports 4 DCCV  Now on Amiodarone 200mg PO bid and Eliquis 5mg PO bid  Last DCCV 4 yrs ago at [facility]      Crohn's disease  s/p partial resection of ileum      Hyperlipidemia      Hypothyroidism      History of depression  On Venlafaxine ER 150mg PO bid      H/O knee surgery      History of cataract surgery          Current Nutrition Order:   TPN- 123g AA + 455g dextrose + 250ml SMOFlipids; provides 2539kcals, 29.4kcals/kg IBW, 1.42g protein/kg, GIR 3.09  Clear liquid diet    PO Intake: Good (%) [   ]  Fair (50-75%) [ X  ] Poor (<25%) [   ]    GI Issues: +distended, pt denies n/v/abd pain; ileostomy    Pain: denies pain/discomfort    Skin Integrity:  stage II PU to L buttock, stage I PU to sacrum, midline wound vac, ileostomy, L JOYCE drain to suction    Labs:   07-25    140  |  108  |  23  ----------------------------<  129<H>  5.0   |  24  |  0.81    Ca    7.7<L>      25 Jul 2023 05:30  Phos  3.4     07-25  Mg     2.1     07-25    TPro  7.0  /  Alb  1.9<L>  /  TBili  5.1<H>  /  DBili  3.5<H>  /  AST  165<H>  /  ALT  140<H>  /  AlkPhos  109  07-25    CAPILLARY BLOOD GLUCOSE      POCT Blood Glucose.: 140 mg/dL (25 Jul 2023 00:29)  POCT Blood Glucose.: 126 mg/dL (24 Jul 2023 18:02)  POCT Blood Glucose.: 142 mg/dL (24 Jul 2023 11:49)      Medications:  MEDICATIONS  (STANDING):  aMIOdarone    Tablet 200 milliGRAM(s) Oral daily  amLODIPine   Tablet 10 milliGRAM(s) Oral daily  atorvastatin 20 milliGRAM(s) Oral at bedtime  chlorhexidine 2% Cloths 1 Application(s) Topical <User Schedule>  cholestyramine Powder (Sugar-Free) 4 Gram(s) Oral two times a day  dextrose 5%. 1000 milliLiter(s) (50 mL/Hr) IV Continuous <Continuous>  dextrose 5%. 1000 milliLiter(s) (100 mL/Hr) IV Continuous <Continuous>  dextrose 50% Injectable 25 Gram(s) IV Push once  dextrose 50% Injectable 12.5 Gram(s) IV Push once  dextrose 50% Injectable 25 Gram(s) IV Push once  glucagon  Injectable 1 milliGRAM(s) IntraMuscular once  heparin   Injectable 5000 Unit(s) SubCutaneous every 8 hours  imipenem/cilastatin  IVPB 1000 milliGRAM(s) IV Intermittent every 8 hours  insulin lispro (ADMELOG) corrective regimen sliding scale   SubCutaneous every 6 hours  levothyroxine 50 MICROGram(s) Oral daily  lipid, fat emulsion (Fish Oil and Plant Based) 20% Infusion 0.5 Gm/kG/Day (20.83 mL/Hr) IV Continuous <Continuous>  losartan 25 milliGRAM(s) Oral daily  metoprolol tartrate 25 milliGRAM(s) Oral every 12 hours  octreotide  Injectable 100 MICROGram(s) IV Push every 8 hours  pantoprazole  Injectable 40 milliGRAM(s) IV Push every 12 hours  Parenteral Nutrition - Adult 1 Each (85 mL/Hr) TPN Continuous <Continuous>  venlafaxine XR. 300 milliGRAM(s) Oral daily    MEDICATIONS  (PRN):  acetaminophen     Tablet .. 650 milliGRAM(s) Oral every 6 hours PRN Mild Pain (1 - 3)  benzocaine/menthol Lozenge 2 Lozenge Oral every 4 hours PRN Sore Throat  dextrose Oral Gel 15 Gram(s) Oral once PRN Blood Glucose LESS THAN 70 milliGRAM(s)/deciliter  hydrALAZINE Injectable 10 milliGRAM(s) IV Push every 4 hours PRN SBP > 140  melatonin 5 milliGRAM(s) Oral at bedtime PRN Sleep      Weight: 101kg [5 July 2023]  Daily   99.9kg [9 July 2023]  Daily 102.1kg [10 July 2023]    Weight Change: weight gain ? r/t fluid shifts, edema. previously noted with trace generalized edema    IBW: 86.4kg   % IBW: 118.2% IBW    Estimated energy needs:   Ideal body weight (86.4kg) used for calculations as pt >100% of IBW, BMI <30 per [facility] Standards of Care. Needs estimated for age and adjusted for current clinical status, increased needs for post-op & open abd wound healing    Calories: 9382-8794 kcals based on 30-35 kcals/kg  Protein: 129.6-172.8 g protein based on 1.5-2g protein/kg  *Fluid needs per team    Subjective:   78M w/ Crohn's, AFib/Flutter s/p DCCVs on amiodarone, remote ileocectomy and open appy here for SBO vs Crohn’s flare, s/p NGT decompression and now s/p lap TRE converted to open TRE, SBR x 3, left in discontinuity with abthera vac on 6/27, RTOR for ileocolic resection, small bowel anastomosis, and abdominal wall closure on 6/28, and s/p IR aspiration of perihepatic fluid on 7/3, stepped down to telemetery on 7/4. wound dehiscence on 7/5, RTOR 7/5 for exlap, washout. Extubated 7/6. Continues on TPN and started on CLD on 7/12. Started on cholestyramine and octreotide. Seen by SLP on 7/14 and cleared for soft & bite sized diet as appropriate.     Chart reviewed, discussed with team. Pt seen on 5 EA this morning with friend at bedside. Currently receiving TPN as primary means to nutrition, on clear liquid diet. Afebrile. HR & BP WNL. MAPs trending >65 mmHg.    Previous Nutrition Diagnosis: Diagnosis: Increased Nutrient Needs R/T physiological demands for nutrient AEB post-op wound healing      Active [ X  ]  Resolved [   ]    If resolved, new PES:     Goal: Pt will meet at least 75% of protein & energy needs via most appropriate route for nutrition     Recommendations:    Education:     Risk Level: High [   ] Moderate [   ] Low [   ] Admitting Diagnosis:   Patient is a 78y old  Male who presents with a chief complaint of SBO (25 Jul 2023 09:35)      PAST MEDICAL & SURGICAL HISTORY:  Essential hypertension  Hypertension      Atrial fibrillation  s/p cardioversion 2010 and 2014  Pt. reports 4 DCCV  Now on Amiodarone 200mg PO bid and Eliquis 5mg PO bid  Last DCCV [facility] yrs ago at [facility]      Crohn's disease  s/p partial resection of ileum      Hyperlipidemia      Hypothyroidism      History of depression  On Venlafaxine ER 150mg PO bid      H/O knee surgery      History of cataract surgery          Current Nutrition Order:   TPN- 123g AA + 455g dextrose + 250ml SMOFlipids; provides 2539kcals, 29.4kcals/kg IBW, 1.42g protein/kg, GIR 3.09  Clear liquid diet    PO Intake: Good (%) [   ]  Fair (50-75%) [ X  ] Poor (<25%) [   ]    GI Issues: +distended, pt denies n/v/abd pain; ileostomy    Pain: denies pain/discomfort    Skin Integrity:  stage II PU to L buttock, stage I PU to sacrum, midline wound vac, ileostomy, L JOYCE drain to suction    Labs:   07-25    140  |  108  |  23  ----------------------------<  129<H>  5.0   |  24  |  0.81    Ca    7.7<L>      25 Jul 2023 05:30  Phos  3.4     07-25  Mg     2.1     07-25    TPro  7.0  /  Alb  1.9<L>  /  TBili  5.1<H>  /  DBili  3.5<H>  /  AST  165<H>  /  ALT  140<H>  /  AlkPhos  109  07-25    CAPILLARY BLOOD GLUCOSE      POCT Blood Glucose.: 140 mg/dL (25 Jul 2023 00:29)  POCT Blood Glucose.: 126 mg/dL (24 Jul 2023 18:02)  POCT Blood Glucose.: 142 mg/dL (24 Jul 2023 11:49)      Medications:  MEDICATIONS  (STANDING):  aMIOdarone    Tablet 200 milliGRAM(s) Oral daily  amLODIPine   Tablet 10 milliGRAM(s) Oral daily  atorvastatin 20 milliGRAM(s) Oral at bedtime  chlorhexidine 2% Cloths 1 Application(s) Topical <User Schedule>  cholestyramine Powder (Sugar-Free) 4 Gram(s) Oral two times a day  dextrose 5%. 1000 milliLiter(s) (50 mL/Hr) IV Continuous <Continuous>  dextrose 5%. 1000 milliLiter(s) (100 mL/Hr) IV Continuous <Continuous>  dextrose 50% Injectable 25 Gram(s) IV Push once  dextrose 50% Injectable 12.5 Gram(s) IV Push once  dextrose 50% Injectable 25 Gram(s) IV Push once  glucagon  Injectable 1 milliGRAM(s) IntraMuscular once  heparin   Injectable 5000 Unit(s) SubCutaneous every 8 hours  imipenem/cilastatin  IVPB 1000 milliGRAM(s) IV Intermittent every 8 hours  insulin lispro (ADMELOG) corrective regimen sliding scale   SubCutaneous every 6 hours  levothyroxine 50 MICROGram(s) Oral daily  lipid, fat emulsion (Fish Oil and Plant Based) 20% Infusion 0.5 Gm/kG/Day (20.83 mL/Hr) IV Continuous <Continuous>  losartan 25 milliGRAM(s) Oral daily  metoprolol tartrate 25 milliGRAM(s) Oral every 12 hours  octreotide  Injectable 100 MICROGram(s) IV Push every 8 hours  pantoprazole  Injectable 40 milliGRAM(s) IV Push every 12 hours  Parenteral Nutrition - Adult 1 Each (85 mL/Hr) TPN Continuous <Continuous>  venlafaxine XR. 300 milliGRAM(s) Oral daily    MEDICATIONS  (PRN):  acetaminophen     Tablet .. 650 milliGRAM(s) Oral every 6 hours PRN Mild Pain (1 - 3)  benzocaine/menthol Lozenge 2 Lozenge Oral every 4 hours PRN Sore Throat  dextrose Oral Gel 15 Gram(s) Oral once PRN Blood Glucose LESS THAN 70 milliGRAM(s)/deciliter  hydrALAZINE Injectable 10 milliGRAM(s) IV Push every 4 hours PRN SBP > 140  melatonin 5 milliGRAM(s) Oral at bedtime PRN Sleep      Weight: 101kg [5 July 2023]  Daily   99.9kg [9 July 2023]  Daily 102.1kg [10 July 2023]    Weight Change: weight gain ? r/t fluid shifts, edema. previously noted with trace generalized edema    IBW: 86.4kg   % IBW: 118.2% IBW    Estimated energy needs:   Ideal body weight (86.4kg) used for calculations as pt >100% of IBW, BMI <30 per [facility] Standards of Care. Needs estimated for age and adjusted for current clinical status, increased needs for post-op & open abd wound healing    Calories: 8889-5520 kcals based on 30-35 kcals/kg  Protein: 129.6-172.8 g protein based on 1.5-2g protein/kg  *Fluid needs per team    Subjective:   78M w/ Crohn's, AFib/Flutter s/p DCCVs on amiodarone, remote ileocectomy and open appy here for SBO vs Crohn’s flare, s/p NGT decompression and now s/p lap TRE converted to open TRE, SBR x 3, left in discontinuity with abthera vac on 6/27, RTOR for ileocolic resection, small bowel anastomosis, and abdominal wall closure on 6/28, and s/p IR aspiration of perihepatic fluid on 7/3, stepped down to telemetery on 7/4. wound dehiscence on 7/5, RTOR 7/5 for exlap, washout. Extubated 7/6. Continues on TPN and started on CLD on 7/12. Started on cholestyramine and octreotide. Seen by SLP on 7/14 and cleared for soft & bite sized diet as appropriate.     Chart reviewed, discussed with team. Pt seen on 5 EA this morning with friend at bedside. Currently receiving TPN as primary means to nutrition, provides 29.4kcals/kg IBW, 1.42g protein/kg. Also on clear liquid diet. Afebrile. HR & BP WNL. MAPs trending >65 mmHg.    Previous Nutrition Diagnosis: Diagnosis: Increased Nutrient Needs R/T physiological demands for nutrient AEB post-op wound healing      Active [ X  ]  Resolved [   ]    If resolved, new PES:     Goal: Pt will meet at least 75% of protein & energy needs via most appropriate route for nutrition     Recommendations:    Education:     Risk Level: High [   ] Moderate [   ] Low [   ] Admitting Diagnosis:   Patient is a 78y old  Male who presents with a chief complaint of SBO (25 Jul 2023 09:35)      PAST MEDICAL & SURGICAL HISTORY:  Essential hypertension  Hypertension      Atrial fibrillation  s/p cardioversion 2010 and 2014  Pt. reports 4 DCCV  Now on Amiodarone 200mg PO bid and Eliquis 5mg PO bid  Last DCCV 4 yrs ago at [facility]      Crohn's disease  s/p partial resection of ileum      Hyperlipidemia      Hypothyroidism      History of depression  On Venlafaxine ER 150mg PO bid      H/O knee surgery      History of cataract surgery          Current Nutrition Order:   TPN- 123g AA + 455g dextrose + 50g SMOF lipids; provides 2539kcals, 29.4kcals/kg IBW, 1.42g protein/kg, GIR 3.09  Clear liquid diet    PO Intake: Good (%) [   ]  Fair (50-75%) [ X  ] Poor (<25%) [   ]    GI Issues: +distended, pt denies n/v/abd pain; ileostomy w/ gas and brown output     Pain: denies pain/discomfort    Skin Integrity:  stage II PU to L buttock, stage I PU to sacrum, midline wound vac, ileostomy, L JOYCE drain to suction, R JOYCE drain with scant serous output    Labs:   07-25    140  |  108  |  23  ----------------------------<  129<H>  5.0   |  24  |  0.81    Ca    7.7<L>      25 Jul 2023 05:30  Phos  3.4     07-25  Mg     2.1     07-25    TPro  7.0  /  Alb  1.9<L>  /  TBili  5.1<H>  /  DBili  3.5<H>  /  AST  165<H>  /  ALT  140<H>  /  AlkPhos  109  07-25    CAPILLARY BLOOD GLUCOSE      POCT Blood Glucose.: 140 mg/dL (25 Jul 2023 00:29)  POCT Blood Glucose.: 126 mg/dL (24 Jul 2023 18:02)  POCT Blood Glucose.: 142 mg/dL (24 Jul 2023 11:49)      Medications:  MEDICATIONS  (STANDING):  aMIOdarone    Tablet 200 milliGRAM(s) Oral daily  amLODIPine   Tablet 10 milliGRAM(s) Oral daily  atorvastatin 20 milliGRAM(s) Oral at bedtime  chlorhexidine 2% Cloths 1 Application(s) Topical <User Schedule>  cholestyramine Powder (Sugar-Free) 4 Gram(s) Oral two times a day  dextrose 5%. 1000 milliLiter(s) (50 mL/Hr) IV Continuous <Continuous>  dextrose 5%. 1000 milliLiter(s) (100 mL/Hr) IV Continuous <Continuous>  dextrose 50% Injectable 25 Gram(s) IV Push once  dextrose 50% Injectable 12.5 Gram(s) IV Push once  dextrose 50% Injectable 25 Gram(s) IV Push once  glucagon  Injectable 1 milliGRAM(s) IntraMuscular once  heparin   Injectable 5000 Unit(s) SubCutaneous every 8 hours  imipenem/cilastatin  IVPB 1000 milliGRAM(s) IV Intermittent every 8 hours  insulin lispro (ADMELOG) corrective regimen sliding scale   SubCutaneous every 6 hours  levothyroxine 50 MICROGram(s) Oral daily  lipid, fat emulsion (Fish Oil and Plant Based) 20% Infusion 0.5 Gm/kG/Day (20.83 mL/Hr) IV Continuous <Continuous>  losartan 25 milliGRAM(s) Oral daily  metoprolol tartrate 25 milliGRAM(s) Oral every 12 hours  octreotide  Injectable 100 MICROGram(s) IV Push every 8 hours  pantoprazole  Injectable 40 milliGRAM(s) IV Push every 12 hours  Parenteral Nutrition - Adult 1 Each (85 mL/Hr) TPN Continuous <Continuous>  venlafaxine XR. 300 milliGRAM(s) Oral daily    MEDICATIONS  (PRN):  acetaminophen     Tablet .. 650 milliGRAM(s) Oral every 6 hours PRN Mild Pain (1 - 3)  benzocaine/menthol Lozenge 2 Lozenge Oral every 4 hours PRN Sore Throat  dextrose Oral Gel 15 Gram(s) Oral once PRN Blood Glucose LESS THAN 70 milliGRAM(s)/deciliter  hydrALAZINE Injectable 10 milliGRAM(s) IV Push every 4 hours PRN SBP > 140  melatonin 5 milliGRAM(s) Oral at bedtime PRN Sleep      Weight: 101kg [5 July 2023]  Daily   99.9kg [9 July 2023]  Daily 102.1kg [10 July 2023]    Weight Change: weight gain ? r/t fluid shifts, edema. previously noted with trace generalized edema    IBW: 86.4kg   % IBW: 118.2% IBW    Estimated energy needs:   Ideal body weight (86.4kg) used for calculations as pt >100% of IBW, BMI <30 per [facility] Standards of Care. Needs estimated for age and adjusted for current clinical status, increased needs for post-op & open abd wound healing    Calories: 5314-2066 kcals based on 30-35 kcals/kg  Protein: 129.6-172.8 g protein based on 1.5-2g protein/kg  *Fluid needs per team    Subjective:   78M w/ Crohn's, AFib/Flutter s/p DCCVs on amiodarone, remote ileocectomy and open appy here for SBO vs Crohn’s flare, s/p NGT decompression and now s/p lap TRE converted to open TRE, SBR x 3, left in discontinuity with abthera vac on 6/27, RTOR for ileocolic resection, small bowel anastomosis, and abdominal wall closure on 6/28, and s/p IR aspiration of perihepatic fluid on 7/3, stepped down to telemetery on 7/4. wound dehiscence on 7/5, RTOR 7/5 for exlap, washout. Extubated 7/6. Continues on TPN and started on CLD on 7/12. Started on cholestyramine and octreotide. Seen by SLP on 7/14 and cleared for soft & bite sized diet as appropriate.     Chart reviewed, discussed with team. Pt seen on 5 EA this morning awake & alert with friend at bedside. Currently receiving TPN as primary means to nutrition, provides 29.4kcals/kg IBW, 1.42g protein/kg. Also on clear liquid diet. Afebrile. HR & BP WNL. MAPs trending >65 mmHg. I&Os x 24hrs: 3265.46 / 2045 = +1220.46ml net. Pt reports minimal appetite however fair PO intake. Pt requesting ice cream, on clear liquid diet- MD made aware. Consider upgrade to full liquid diet to promote PO intake [more menu options]. Labs reviewed- ALT & AST trending up. direct & total bilirubin elevated. RDN will continue to monitor, reassess, and intervene as appropriate.       Previous Nutrition Diagnosis: Diagnosis: Increased Nutrient Needs R/T physiological demands for nutrient AEB post-op wound healing      Active [ X  ]  Resolved [   ]    If resolved, new PES:     Goal:   Pt will meet at least 75% of protein & energy needs via most appropriate route for nutrition     Recommendations:  1. Continue TPN as primary means to nutrition  - recommend 427g dextrose + 146g AA + 50g SMOF lipids  - to provide 2535.8kcals, 29.3 kcals/kg IBW, 1.68g protein/kg IBW, GIR 2.90  2. Monitor GI tract viability  - consider advancing diet to full liquids fi medically feasible to allow for more menu options/improve PO intake  - advance diet as tolerated --> goal: low fat, low fiber  - initiate TPN taper when consistent PO intake meeting >60% estimated needs  3. Weekly lipid panel  - hold if TG >400  4. Vitamins/minerals per team in TPN --> change to PO once on solely PO diet  5. Daily BMP/Mg/Phos, POC BG Q4-6hrs  6. Pain regimen per team     Education: discussed RD role, plan for future PO advancement     Education: plan for nutrition, protein needs    Risk Level: High [ X  ] Moderate [   ] Low [   ]

## 2023-07-25 NOTE — PROGRESS NOTE ADULT - ASSESSMENT
78M POD28 laparoscopic lysis of adhesions, converted to open lysis of adhesions, SBR x 3, temporary abdominal closure, POD27 ex lap, removal of abthera, ileocolic resection, small bowel anastomosis c/b anastomotic leak resulting in wound dehiscence now POD20 RTOR exlap, washout, ileocolic resection with end ileostomy, blow hole colostomy, fistula, red rubber from ileostomy to small bowel anastomosis; vicryl bridging mesh; R JOYCE below ileostomy, L JOYCE at small bowel enterotomy repair.    Vac change Thursday or sooner if indicated  Encourage ambulation, OOBTC  Closely monitor vac output  Appreciate excellent SICU care

## 2023-07-25 NOTE — PROGRESS NOTE ADULT - NS ATTEND AMEND GEN_ALL_CORE FT
#Acute acalculus cholecystitis, sepsis    Patient improving on imipenem, feeling better.  Tbili downtrending now to 6.1 but WBC remains elevated at 13.  Cont imipenem 1g IV q8h.  f/u BCx.    Team 2 will follow you.  Case d/w primary team.    Angie Frye MD, MS  Infectious Disease attending  work cell [phone]   For any questions during evening/weekend/holiday, please page ID on call

## 2023-07-25 NOTE — PROGRESS NOTE ADULT - ASSESSMENT
78M h/o Crohn's disease, ileocecectomy, open appy, AFib/Aflutter s/p multiple DCCV p/w acute onset of abdominal pain on 6/23, found to have SBO.  S/p ex-lap, open TRE, resection of small bowell on 6/26. Course c/b pneumothorax s/p R pigtail placement on 6/27, RTOR on 6/28 s/p ileocolic resection with anastomosis, small bowel anastomosis and abdominal wall reconstruction and washout. 6/28 body fluid grow E. casseliflavus. 07/05 RTOR for surgical site dehiscence, was taken back emergently to OR for exlap, found to have anastomotic leak s/p exlap with washout and ileostomy/colostomy creation s/p imipenem/fluconazole for polymicrobial anastomotic leak/feculent peritonitis completed 7/11. Pt had new fever and worsening leukocytosis started on javier/dapto 7/23. BCxs ngtd and UA negative. CT chest with atelectasis, findings not convincing for pneumonia. CT A/P with distended gallbladder with layering sludge and elevated bilirubin, transaminitis on labs c/f acalculous cholecystitis. Per primary team, as bilirubin is stable will not place C tube at this time. Afebrile, WBC stable and total bili downtrending    Suggest:  -F/u blood cultures 7/23  -continue to trend temp, WBC and bilirubin/LFTs  -cont imipenem 1g IV q8h   -consider US of RUQ or HIDA scan for definitive diagnosis     Team 2 will follow you.    Case d/w primary team.  Final recommendation pending attending note.    Nabila Mcfarland, Infectious Diseases PA  Please reach out for any questions 9 am-5pm. For evenings and weekends, please call the ID physician on call.  Work cell: [phone]

## 2023-07-25 NOTE — PROGRESS NOTE ADULT - SUBJECTIVE AND OBJECTIVE BOX
SUBJECTIVE: patient seen this morning, tolerating clears. Wound vac holding suction.     MEDICATIONS  (STANDING):  aMIOdarone    Tablet 200 milliGRAM(s) Oral daily  amLODIPine   Tablet 10 milliGRAM(s) Oral daily  atorvastatin 20 milliGRAM(s) Oral at bedtime  chlorhexidine 2% Cloths 1 Application(s) Topical <User Schedule>  cholestyramine Powder (Sugar-Free) 4 Gram(s) Oral two times a day  dextrose 5%. 1000 milliLiter(s) (50 mL/Hr) IV Continuous <Continuous>  dextrose 5%. 1000 milliLiter(s) (100 mL/Hr) IV Continuous <Continuous>  dextrose 50% Injectable 25 Gram(s) IV Push once  dextrose 50% Injectable 12.5 Gram(s) IV Push once  dextrose 50% Injectable 25 Gram(s) IV Push once  glucagon  Injectable 1 milliGRAM(s) IntraMuscular once  heparin   Injectable 5000 Unit(s) SubCutaneous every 8 hours  imipenem/cilastatin  IVPB 1000 milliGRAM(s) IV Intermittent every 8 hours  insulin lispro (ADMELOG) corrective regimen sliding scale   SubCutaneous every 6 hours  levothyroxine 50 MICROGram(s) Oral daily  lipid, fat emulsion (Fish Oil and Plant Based) 20% Infusion 0.5 Gm/kG/Day (20.83 mL/Hr) IV Continuous <Continuous>  losartan 25 milliGRAM(s) Oral daily  metoprolol tartrate 25 milliGRAM(s) Oral every 12 hours  octreotide  Injectable 100 MICROGram(s) IV Push every 8 hours  pantoprazole  Injectable 40 milliGRAM(s) IV Push every 12 hours  Parenteral Nutrition - Adult 1 Each (85 mL/Hr) TPN Continuous <Continuous>  venlafaxine XR. 300 milliGRAM(s) Oral daily    MEDICATIONS  (PRN):  acetaminophen     Tablet .. 650 milliGRAM(s) Oral every 6 hours PRN Mild Pain (1 - 3)  benzocaine/menthol Lozenge 2 Lozenge Oral every 4 hours PRN Sore Throat  dextrose Oral Gel 15 Gram(s) Oral once PRN Blood Glucose LESS THAN 70 milliGRAM(s)/deciliter  hydrALAZINE Injectable 10 milliGRAM(s) IV Push every 4 hours PRN SBP > 140  melatonin 5 milliGRAM(s) Oral at bedtime PRN Sleep      Drips:     ICU Vital Signs Last 24 Hrs  T(C): 36.5 (25 Jul 2023 09:24), Max: 37.1 (24 Jul 2023 21:55)  T(F): 97.7 (25 Jul 2023 09:24), Max: 98.8 (24 Jul 2023 21:55)  HR: 81 (25 Jul 2023 09:00) (76 - 92)  BP: 145/65 (25 Jul 2023 09:00) (110/62 - 163/73)  BP(mean): 94 (25 Jul 2023 09:00) (79 - 107)  ABP: --  ABP(mean): --  RR: 25 (25 Jul 2023 09:00) (19 - 36)  SpO2: 99% (25 Jul 2023 09:00) (94% - 100%)    O2 Parameters below as of 25 Jul 2023 09:00  Patient On (Oxygen Delivery Method): room air          Physical exam  General: NAD, resting comfortably in bed  C/V: NSR  Pulm: Nonlabored breathing, no respiratory distress  Abd: soft, nondistended, midline laparotomy w/ vac in place w/ good suction, brown enteric output in cannister. Ostomy ppp w/ gas and brown output in pouch. Mucous fistula appreciated. R JOYCE w/ scant serous output, L JOYCE = w/ bilious output.  Extrem: WWP, no edema, SCDs in place      I&O's Summary    24 Jul 2023 07:01  -  25 Jul 2023 07:00  --------------------------------------------------------  IN: 3265.5 mL / OUT: 2045 mL / NET: 1220.5 mL    25 Jul 2023 07:01  -  25 Jul 2023 09:55  --------------------------------------------------------  IN: 85 mL / OUT: 125 mL / NET: -40 mL        LABS:                        8.2    13.30 )-----------( 296      ( 25 Jul 2023 05:30 )             26.0     07-25    140  |  108  |  23  ----------------------------<  129<H>  5.0   |  24  |  0.81    Ca    7.7<L>      25 Jul 2023 05:30  Phos  3.4     07-25  Mg     2.1     07-25    TPro  7.0  /  Alb  1.9<L>  /  TBili  5.1<H>  /  DBili  3.5<H>  /  AST  165<H>  /  ALT  140<H>  /  AlkPhos  109  07-25      Urinalysis Basic - ( 25 Jul 2023 05:30 )    Color: x / Appearance: x / SG: x / pH: x  Gluc: 129 mg/dL / Ketone: x  / Bili: x / Urobili: x   Blood: x / Protein: x / Nitrite: x   Leuk Esterase: x / RBC: x / WBC x   Sq Epi: x / Non Sq Epi: x / Bacteria: x      CAPILLARY BLOOD GLUCOSE      POCT Blood Glucose.: 140 mg/dL (25 Jul 2023 00:29)  POCT Blood Glucose.: 126 mg/dL (24 Jul 2023 18:02)  POCT Blood Glucose.: 142 mg/dL (24 Jul 2023 11:49)    LIVER FUNCTIONS - ( 25 Jul 2023 05:30 )  Alb: 1.9 g/dL / Pro: 7.0 g/dL / ALK PHOS: 109 U/L / ALT: 140 U/L / AST: 165 U/L / GGT: x

## 2023-07-26 NOTE — PROGRESS NOTE ADULT - ATTENDING COMMENTS
Crohn's, AF, s/p SBR with anastomotic leak, ileocolic resection, end ileostomy, blowhole colostomy  physical as above  continue wound vac  TPN written  continue amiodarone and metoprolol  continue empiric imipenem

## 2023-07-26 NOTE — PROGRESS NOTE ADULT - ASSESSMENT
78M POD29 laparoscopic lysis of adhesions, converted to open lysis of adhesions, SBR x 3, temporary abdominal closure, POD28 ex lap, removal of abthera, ileocolic resection, small bowel anastomosis c/b anastomotic leak resulting in wound dehiscence now POD21 RTOR exlap, washout, ileocolic resection with end ileostomy, blow hole colostomy, fistula, red rubber from ileostomy to small bowel anastomosis; vicryl bridging mesh; R JOYCE below ileostomy, L JOYCE at small bowel enterotomy repair.    Please change vac cannister q6h  Vac change tomorrow w/ Wound Care Nursing, sooner if indicated. Please premedicated at 0945 for 10:00AM change.  Encourage ambulation, OOBTC  Closely monitor vac output  Appreciate excellent SICU care

## 2023-07-26 NOTE — PROGRESS NOTE ADULT - SUBJECTIVE AND OBJECTIVE BOX
ON: Hypoglycemic to 71 given juice repeat 120-> 102,        SUBJECTIVE: Pt seen and examined at bedside this am by ICU team. Patient lying in bed, NAD. No complaints at this time. Reports he has been tolerating diet. States he is not nauseated. Reports he has been OOB.    MEDICATIONS  (STANDING):  aMIOdarone    Tablet 200 milliGRAM(s) Oral daily  amLODIPine   Tablet 10 milliGRAM(s) Oral daily  atorvastatin 20 milliGRAM(s) Oral at bedtime  chlorhexidine 2% Cloths 1 Application(s) Topical <User Schedule>  cholestyramine Powder (Sugar-Free) 4 Gram(s) Oral two times a day  dextrose 5%. 1000 milliLiter(s) (50 mL/Hr) IV Continuous <Continuous>  dextrose 5%. 1000 milliLiter(s) (100 mL/Hr) IV Continuous <Continuous>  dextrose 50% Injectable 25 Gram(s) IV Push once  dextrose 50% Injectable 12.5 Gram(s) IV Push once  dextrose 50% Injectable 25 Gram(s) IV Push once  glucagon  Injectable 1 milliGRAM(s) IntraMuscular once  heparin   Injectable 5000 Unit(s) SubCutaneous every 8 hours  imipenem/cilastatin  IVPB 1000 milliGRAM(s) IV Intermittent every 8 hours  insulin lispro (ADMELOG) corrective regimen sliding scale   SubCutaneous every 6 hours  levothyroxine 50 MICROGram(s) Oral daily  lipid, fat emulsion (Fish Oil and Plant Based) 20% Infusion 0.5 Gm/kG/Day (20.83 mL/Hr) IV Continuous <Continuous>  losartan 25 milliGRAM(s) Oral daily  metoprolol tartrate 25 milliGRAM(s) Oral every 12 hours  octreotide  Injectable 100 MICROGram(s) IV Push every 8 hours  pantoprazole  Injectable 40 milliGRAM(s) IV Push every 12 hours  Parenteral Nutrition - Adult 1 Each (96 mL/Hr) TPN Continuous <Continuous>  Parenteral Nutrition - Adult 1 Each (96 mL/Hr) TPN Continuous <Continuous>  venlafaxine XR. 300 milliGRAM(s) Oral daily    MEDICATIONS  (PRN):  acetaminophen     Tablet .. 650 milliGRAM(s) Oral every 6 hours PRN Mild Pain (1 - 3)  benzocaine/menthol Lozenge 2 Lozenge Oral every 4 hours PRN Sore Throat  dextrose Oral Gel 15 Gram(s) Oral once PRN Blood Glucose LESS THAN 70 milliGRAM(s)/deciliter  hydrALAZINE Injectable 10 milliGRAM(s) IV Push every 4 hours PRN SBP > 140  melatonin 5 milliGRAM(s) Oral at bedtime PRN Sleep      ICU Vital Signs Last 24 Hrs  T(C): 36.7 (26 Jul 2023 13:14), Max: 37.1 (25 Jul 2023 21:55)  T(F): 98.1 (26 Jul 2023 13:14), Max: 98.7 (25 Jul 2023 21:55)  HR: 77 (26 Jul 2023 13:00) (76 - 85)  BP: 145/64 (26 Jul 2023 13:00) (128/84 - 176/73)  BP(mean): 92 (26 Jul 2023 13:00) (91 - 110)  RR: 22 (26 Jul 2023 13:00) (22 - 40)  SpO2: 96% (26 Jul 2023 13:00) (93% - 97%)    O2 Parameters below as of 26 Jul 2023 13:00  Patient On (Oxygen Delivery Method): room air            Physical Exam:  General: Jaundiced, resting comfortably in bed, NAD  HEENT: NC/AT, EOMI, PERRLA, normal hearing, no oral lesions, neck supple w/o LAD  Pulmonary: Nonlabored breathing, no respiratory distress, CTA-B  Cardiovascular: NSR, no murmurs  Abdominal: soft, nondistended, nontender. Midline vac in place with adequate suction and brown output. ECF noted at superior aspect of wound with bilious output. JOYCE x1 noted to be bilious  Extremities: WWP, 5/5 strength x 4, no significant edema appreciated  Neuro: A/O x3, CNs II-XII grossly intact, normal motor/sensation, no focal deficits  Pulses: palpable distal pulses      I&O's Summary    25 Jul 2023 07:01  -  26 Jul 2023 07:00  --------------------------------------------------------  IN: 3250.8 mL / OUT: 2535 mL / NET: 715.8 mL    26 Jul 2023 07:01  -  26 Jul 2023 13:54  --------------------------------------------------------  IN: 676 mL / OUT: 800 mL / NET: -124 mL        LABS:                        8.2    13.61 )-----------( 312      ( 26 Jul 2023 05:19 )             25.7     07-26    137  |  108  |  21  ----------------------------<  118<H>  4.8   |  21<L>  |  0.87    Ca    7.3<L>      26 Jul 2023 05:19  Phos  3.0     07-26  Mg     1.9     07-26    TPro  6.7  /  Alb  1.8<L>  /  TBili  4.2<H>  /  DBili  x   /  AST  159<H>  /  ALT  130<H>  /  AlkPhos  121<H>  07-26      Urinalysis Basic - ( 26 Jul 2023 05:19 )    Color: x / Appearance: x / SG: x / pH: x  Gluc: 118 mg/dL / Ketone: x  / Bili: x / Urobili: x   Blood: x / Protein: x / Nitrite: x   Leuk Esterase: x / RBC: x / WBC x   Sq Epi: x / Non Sq Epi: x / Bacteria: x      CAPILLARY BLOOD GLUCOSE      POCT Blood Glucose.: 119 mg/dL (26 Jul 2023 08:04)  POCT Blood Glucose.: 102 mg/dL (26 Jul 2023 00:16)  POCT Blood Glucose.: 120 mg/dL (25 Jul 2023 19:56)  POCT Blood Glucose.: 71 mg/dL (25 Jul 2023 18:36)  POCT Blood Glucose.: 68 mg/dL (25 Jul 2023 18:34)    LIVER FUNCTIONS - ( 26 Jul 2023 05:19 )  Alb: 1.8 g/dL / Pro: 6.7 g/dL / ALK PHOS: 121 U/L / ALT: 130 U/L / AST: 159 U/L / GGT: x             Cultures:    RADIOLOGY & ADDITIONAL STUDIES:

## 2023-07-26 NOTE — PROGRESS NOTE ADULT - SUBJECTIVE AND OBJECTIVE BOX
Pt seen and examined   no complaints  status quo    REVIEW OF SYSTEMS:  Constitutional: No fever,   Cardiovascular: No chest pain, palpitations,   Gastrointestinal: No abdominal or epigastric pain. No nausea, vomiting  Skin: No itching, burning, rashes or lesions       MEDICATIONS:  MEDICATIONS  (STANDING):  aMIOdarone    Tablet 200 milliGRAM(s) Oral daily  amLODIPine   Tablet 10 milliGRAM(s) Oral daily  atorvastatin 20 milliGRAM(s) Oral at bedtime  chlorhexidine 2% Cloths 1 Application(s) Topical <User Schedule>  cholestyramine Powder (Sugar-Free) 4 Gram(s) Oral two times a day  dextrose 5%. 1000 milliLiter(s) (100 mL/Hr) IV Continuous <Continuous>  dextrose 5%. 1000 milliLiter(s) (50 mL/Hr) IV Continuous <Continuous>  dextrose 50% Injectable 25 Gram(s) IV Push once  dextrose 50% Injectable 12.5 Gram(s) IV Push once  dextrose 50% Injectable 25 Gram(s) IV Push once  glucagon  Injectable 1 milliGRAM(s) IntraMuscular once  heparin   Injectable 5000 Unit(s) SubCutaneous every 8 hours  imipenem/cilastatin  IVPB 1000 milliGRAM(s) IV Intermittent every 8 hours  insulin lispro (ADMELOG) corrective regimen sliding scale   SubCutaneous every 6 hours  levothyroxine 50 MICROGram(s) Oral daily  lipid, fat emulsion (Fish Oil and Plant Based) 20% Infusion 0.5 Gm/kG/Day (20.83 mL/Hr) IV Continuous <Continuous>  losartan 25 milliGRAM(s) Oral daily  metoprolol tartrate 25 milliGRAM(s) Oral every 12 hours  octreotide  Injectable 100 MICROGram(s) IV Push every 8 hours  pantoprazole  Injectable 40 milliGRAM(s) IV Push every 12 hours  Parenteral Nutrition - Adult 1 Each (96 mL/Hr) TPN Continuous <Continuous>  Parenteral Nutrition - Adult 1 Each (96 mL/Hr) TPN Continuous <Continuous>  venlafaxine XR. 300 milliGRAM(s) Oral daily    MEDICATIONS  (PRN):  acetaminophen     Tablet .. 650 milliGRAM(s) Oral every 6 hours PRN Mild Pain (1 - 3)  benzocaine/menthol Lozenge 2 Lozenge Oral every 4 hours PRN Sore Throat  dextrose Oral Gel 15 Gram(s) Oral once PRN Blood Glucose LESS THAN 70 milliGRAM(s)/deciliter  hydrALAZINE Injectable 10 milliGRAM(s) IV Push every 4 hours PRN SBP > 140  melatonin 5 milliGRAM(s) Oral at bedtime PRN Sleep      Allergies    penicillin (Angioedema)    Intolerances        Vital Signs Last 24 Hrs  T(C): 36.7 (26 Jul 2023 09:12), Max: 37.1 (25 Jul 2023 21:55)  T(F): 98 (26 Jul 2023 09:12), Max: 98.7 (25 Jul 2023 21:55)  HR: 81 (26 Jul 2023 11:00) (76 - 85)  BP: 145/65 (26 Jul 2023 11:00) (128/84 - 176/73)  BP(mean): 94 (26 Jul 2023 11:00) (90 - 110)  RR: 24 (26 Jul 2023 11:00) (22 - 40)  SpO2: 95% (26 Jul 2023 11:00) (93% - 97%)    Parameters below as of 26 Jul 2023 11:00  Patient On (Oxygen Delivery Method): room air        07-25 @ 07:01 - 07-26 @ 07:00  --------------------------------------------------------  IN: 3250.8 mL / OUT: 2535 mL / NET: 715.8 mL    07-26 @ 07:01 - 07-26 @ 12:19  --------------------------------------------------------  IN: 480 mL / OUT: 200 mL / NET: 280 mL        PHYSICAL EXAM:    General:  in no acute distress  HEENT: MMM, conjunctiva and sclera clear  Gastrointestinal: Soft  non-distended; Normal bowel sounds; ileostomy, drain  Skin: Warm and dry. No obvious rash    LABS:      CBC Full  -  ( 26 Jul 2023 05:19 )  WBC Count : 13.61 K/uL  RBC Count : 2.71 M/uL  Hemoglobin : 8.2 g/dL  Hematocrit : 25.7 %  Platelet Count - Automated : 312 K/uL  Mean Cell Volume : 94.8 fl  Mean Cell Hemoglobin : 30.3 pg  Mean Cell Hemoglobin Concentration : 31.9 gm/dL  Auto Neutrophil # : x  Auto Lymphocyte # : x  Auto Monocyte # : x  Auto Eosinophil # : x  Auto Basophil # : x  Auto Neutrophil % : x  Auto Lymphocyte % : x  Auto Monocyte % : x  Auto Eosinophil % : x  Auto Basophil % : x    07-26    137  |  108  |  21  ----------------------------<  118<H>  4.8   |  21<L>  |  0.87    Ca    7.3<L>      26 Jul 2023 05:19  Phos  3.0     07-26  Mg     1.9     07-26    TPro  6.7  /  Alb  1.8<L>  /  TBili  4.2<H>  /  DBili  x   /  AST  159<H>  /  ALT  130<H>  /  AlkPhos  121<H>  07-26          Urinalysis Basic - ( 26 Jul 2023 05:19 )    Color: x / Appearance: x / SG: x / pH: x  Gluc: 118 mg/dL / Ketone: x  / Bili: x / Urobili: x   Blood: x / Protein: x / Nitrite: x   Leuk Esterase: x / RBC: x / WBC x   Sq Epi: x / Non Sq Epi: x / Bacteria: x                RADIOLOGY & ADDITIONAL STUDIES (The following images were personally reviewed):

## 2023-07-26 NOTE — PROGRESS NOTE ADULT - ASSESSMENT
78M h/o Crohn's disease, ileocecectomy, open appy, AFib/Aflutter s/p multiple DCCV p/w acute onset of abdominal pain on 6/23, found to have SBO.  S/p ex-lap, open TRE, resection of small bowell on 6/26. Course c/b pneumothorax s/p R pigtail placement on 6/27, RTOR on 6/28 s/p ileocolic resection with anastomosis, small bowel anastomosis and abdominal wall reconstruction and washout. 6/28 body fluid grow E. casseliflavus. 07/05 RTOR for surgical site dehiscence, was taken back emergently to OR for exlap, found to have anastomotic leak s/p exlap with washout and ileostomy/colostomy creation s/p imipenem/fluconazole for polymicrobial anastomotic leak/feculent peritonitis completed 7/11. Pt had new fever and worsening leukocytosis started on javier/dapto 7/23. BCxs ngtd and UA negative. CT chest with atelectasis, findings not convincing for pneumonia. CT A/P with distended gallbladder with layering sludge and elevated bilirubin, transaminitis on labs c/f acalculous cholecystitis. Per primary team, as bilirubin is stable will not place C tube at this time. Afebrile, WBC stable and total bili downtrending    Suggest:  -F/u blood cultures 7/23  -continue to trend temp, WBC and bilirubin/LFTs  -cont imipenem 1g IV q8h   -consider US of RUQ or HIDA scan for definitive diagnosis     Team 2 will follow you.    Case d/w primary team.  Final recommendation pending attending note.    Nabila Mcfarland, Infectious Diseases PA  Please reach out for any questions 9 am-5pm. For evenings and weekends, please call the ID physician on call.  Work cell: [phone]

## 2023-07-26 NOTE — PROGRESS NOTE ADULT - ASSESSMENT
78M S/P laparoscopic lysis of adhesions, converted to open lysis of adhesions, SBR x 3, temporary abdominal closure, S/P ex lap, removal of abthera, ileocolic resection, small bowel anastomosis c/b anastomotic leak resulting in wound dehiscence now POD8 RTOR exlap, washout, ileocolic resection with end ileostomy, blow hole colostomy, fistula, red rubber from ileostomy to small bowel anastomosis (removed); vicryl bridging mesh; R JOYCE below ileostomy, L JOYCE at small bowel enterotomy repair. Midline wound with vac in place, enteric content, bile and some blood noted on cannister. Wound vac changed, mesh removed. RUQ US 0.4 gallbladder wall, no evidence of portal or hepatic venous thrombosis. Fluid and sludge distended gallbladder. Patient noted to be more drowsy on 7/23, work up done, CT head unremarkable. Doing well tolerating clears, wound vac holding suction.     - Continue excellent SICU care   - Plan discussed with Dr Peterson

## 2023-07-26 NOTE — PROGRESS NOTE ADULT - SUBJECTIVE AND OBJECTIVE BOX
SUBJECTIVE: patient seen this morning, tolerating clears. Wound vac holding suction.       MEDICATIONS  (STANDING):  aMIOdarone    Tablet 200 milliGRAM(s) Oral daily  amLODIPine   Tablet 10 milliGRAM(s) Oral daily  atorvastatin 20 milliGRAM(s) Oral at bedtime  chlorhexidine 2% Cloths 1 Application(s) Topical <User Schedule>  cholestyramine Powder (Sugar-Free) 4 Gram(s) Oral two times a day  dextrose 5%. 1000 milliLiter(s) (100 mL/Hr) IV Continuous <Continuous>  dextrose 5%. 1000 milliLiter(s) (50 mL/Hr) IV Continuous <Continuous>  dextrose 50% Injectable 25 Gram(s) IV Push once  dextrose 50% Injectable 12.5 Gram(s) IV Push once  dextrose 50% Injectable 25 Gram(s) IV Push once  glucagon  Injectable 1 milliGRAM(s) IntraMuscular once  heparin   Injectable 5000 Unit(s) SubCutaneous every 8 hours  imipenem/cilastatin  IVPB 1000 milliGRAM(s) IV Intermittent every 8 hours  insulin lispro (ADMELOG) corrective regimen sliding scale   SubCutaneous every 6 hours  levothyroxine 50 MICROGram(s) Oral daily  lipid, fat emulsion (Fish Oil and Plant Based) 20% Infusion 0.5 Gm/kG/Day (20.83 mL/Hr) IV Continuous <Continuous>  losartan 25 milliGRAM(s) Oral daily  metoprolol tartrate 25 milliGRAM(s) Oral every 12 hours  octreotide  Injectable 100 MICROGram(s) IV Push every 8 hours  pantoprazole  Injectable 40 milliGRAM(s) IV Push every 12 hours  Parenteral Nutrition - Adult 1 Each (96 mL/Hr) TPN Continuous <Continuous>  Parenteral Nutrition - Adult 1 Each (96 mL/Hr) TPN Continuous <Continuous>  venlafaxine XR. 300 milliGRAM(s) Oral daily    MEDICATIONS  (PRN):  acetaminophen     Tablet .. 650 milliGRAM(s) Oral every 6 hours PRN Mild Pain (1 - 3)  benzocaine/menthol Lozenge 2 Lozenge Oral every 4 hours PRN Sore Throat  dextrose Oral Gel 15 Gram(s) Oral once PRN Blood Glucose LESS THAN 70 milliGRAM(s)/deciliter  hydrALAZINE Injectable 10 milliGRAM(s) IV Push every 4 hours PRN SBP > 140  melatonin 5 milliGRAM(s) Oral at bedtime PRN Sleep      Drips:     ICU Vital Signs Last 24 Hrs  T(C): 36.7 (26 Jul 2023 09:12), Max: 37.1 (25 Jul 2023 21:55)  T(F): 98 (26 Jul 2023 09:12), Max: 98.7 (25 Jul 2023 21:55)  HR: 78 (26 Jul 2023 12:00) (76 - 85)  BP: 145/65 (26 Jul 2023 11:00) (128/84 - 176/73)  BP(mean): 94 (26 Jul 2023 11:00) (90 - 110)  ABP: --  ABP(mean): --  RR: 26 (26 Jul 2023 12:00) (22 - 40)  SpO2: 96% (26 Jul 2023 12:00) (93% - 97%)    O2 Parameters below as of 26 Jul 2023 12:00  Patient On (Oxygen Delivery Method): room air          Physical exam  General: NAD, resting comfortably in bed  C/V: NSR  Pulm: Nonlabored breathing, no respiratory distress  Abd: soft, nondistended, midline laparotomy w/ vac in place w/ good suction, brown enteric output in cannister. Ostomy ppp w/ gas and brown output in pouch. Mucous fistula appreciated. R JOYCE w/ scant serous output, L JOYCE = w/ bilious output.  Extrem: WWP, no edema, SCDs in place        I&O's Summary    25 Jul 2023 07:01  -  26 Jul 2023 07:00  --------------------------------------------------------  IN: 3250.8 mL / OUT: 2535 mL / NET: 715.8 mL    26 Jul 2023 07:01  -  26 Jul 2023 12:21  --------------------------------------------------------  IN: 580 mL / OUT: 600 mL / NET: -20 mL        LABS:                        8.2    13.61 )-----------( 312      ( 26 Jul 2023 05:19 )             25.7     07-26    137  |  108  |  21  ----------------------------<  118<H>  4.8   |  21<L>  |  0.87    Ca    7.3<L>      26 Jul 2023 05:19  Phos  3.0     07-26  Mg     1.9     07-26    TPro  6.7  /  Alb  1.8<L>  /  TBili  4.2<H>  /  DBili  x   /  AST  159<H>  /  ALT  130<H>  /  AlkPhos  121<H>  07-26      Urinalysis Basic - ( 26 Jul 2023 05:19 )    Color: x / Appearance: x / SG: x / pH: x  Gluc: 118 mg/dL / Ketone: x  / Bili: x / Urobili: x   Blood: x / Protein: x / Nitrite: x   Leuk Esterase: x / RBC: x / WBC x   Sq Epi: x / Non Sq Epi: x / Bacteria: x      CAPILLARY BLOOD GLUCOSE      POCT Blood Glucose.: 119 mg/dL (26 Jul 2023 08:04)  POCT Blood Glucose.: 102 mg/dL (26 Jul 2023 00:16)  POCT Blood Glucose.: 120 mg/dL (25 Jul 2023 19:56)  POCT Blood Glucose.: 71 mg/dL (25 Jul 2023 18:36)  POCT Blood Glucose.: 68 mg/dL (25 Jul 2023 18:34)    LIVER FUNCTIONS - ( 26 Jul 2023 05:19 )  Alb: 1.8 g/dL / Pro: 6.7 g/dL / ALK PHOS: 121 U/L / ALT: 130 U/L / AST: 159 U/L / GGT: x             Cultures:    RADIOLOGY & ADDITIONAL STUDIES:

## 2023-07-26 NOTE — PROGRESS NOTE ADULT - NS ATTEND AMEND GEN_ALL_CORE FT
#Acute acalculus cholecystitis, sepsis    Afebrile, patient feeling well, denied n/v/abdominal pain.  Tbili downtrending now to 4.2, but WBC remains elevated at 13.  Cont imipenem 1g IV q8h.  f/u BCx.  Will determine the duration of abx based on the clinical course.       Team 2 will follow you.  Case d/w primary team.    Angie Frye MD, MS  Infectious Disease attending  work cell [phone]   For any questions during evening/weekend/holiday, please page ID on call.

## 2023-07-26 NOTE — PROGRESS NOTE ADULT - SUBJECTIVE AND OBJECTIVE BOX
INTERVAL HPI/OVERNIGHT EVENTS: Hypoglycemic to 71 given juice repeat 120-> 102,      STATUS POST:  6/27: Laparoscopic lysis of adhesions, converted to open lysis of adhesions, SBR x 3, temporary abdominal closure, 5L cryst, 1L 5% alb, 3 pRBC, 1 FFP, 1 plts  6/28: ex lap, removal of abthera, ileocolic resection, small bowel anastamosis, , 1.6 crystalloid, 250 5%, 175 uop   7/3: IR Gendel drained perihepatic aspiration of serous fluid.   7/5: RTOR exlap, washout, ileocolic resection with end ileostomy, blow hole colostomy, fistula, red rubber from ileostomy to small bowel anastomosis; vicryl bridging mesh; R JOYCE below ileostomy, L JOYCE at small bowel enterotomy repair; 500 LR, 500 5% albumin, 3u PRBC, 2 FFP, 400 UOP,     SUBJECTIVE: Patient seen and examined at bedside. In good spirits, pain is well-controlled, tolerating PO without n/v. Voiding without issues. Denies cp, sob.      aMIOdarone    Tablet 200 milliGRAM(s) Oral daily  amLODIPine   Tablet 10 milliGRAM(s) Oral daily  heparin   Injectable 5000 Unit(s) SubCutaneous every 8 hours  hydrALAZINE Injectable 10 milliGRAM(s) IV Push every 4 hours PRN  imipenem/cilastatin  IVPB 1000 milliGRAM(s) IV Intermittent every 8 hours  losartan 25 milliGRAM(s) Oral daily  metoprolol tartrate 25 milliGRAM(s) Oral every 12 hours      Vital Signs Last 24 Hrs  T(C): 36.7 (26 Jul 2023 13:14), Max: 37.1 (25 Jul 2023 21:55)  T(F): 98.1 (26 Jul 2023 13:14), Max: 98.7 (25 Jul 2023 21:55)  HR: 78 (26 Jul 2023 12:00) (76 - 85)  BP: 145/65 (26 Jul 2023 11:00) (128/84 - 176/73)  BP(mean): 94 (26 Jul 2023 11:00) (91 - 110)  RR: 26 (26 Jul 2023 12:00) (22 - 40)  SpO2: 96% (26 Jul 2023 12:00) (93% - 97%)    Parameters below as of 26 Jul 2023 12:00  Patient On (Oxygen Delivery Method): room air      I&O's Detail    25 Jul 2023 07:01  -  26 Jul 2023 07:00  --------------------------------------------------------  IN:    Fat Emulsion (Fish Oil &amp; Plant Based) 20% Infusion: 228.8 mL    IV PiggyBack: 250 mL    Oral Fluid: 600 mL    TPN (Total Parenteral Nutrition): 2172 mL  Total IN: 3250.8 mL    OUT:    Bulb (mL): 35 mL    Drain (mL): 70 mL    Drain (mL): 340 mL    Ileostomy (mL): 50 mL    VAC (Vacuum Assisted Closure) System (mL): 550 mL    Voided (mL): 1490 mL  Total OUT: 2535 mL    Total NET: 715.8 mL      26 Jul 2023 07:01  -  26 Jul 2023 13:31  --------------------------------------------------------  IN:    Oral Fluid: 100 mL    TPN (Total Parenteral Nutrition): 480 mL  Total IN: 580 mL    OUT:    Bulb (mL): 0 mL    VAC (Vacuum Assisted Closure) System (mL): 100 mL    Voided (mL): 500 mL  Total OUT: 600 mL    Total NET: -20 mL          General: NAD, resting comfortably in bed  C/V: NSR  Pulm: Nonlabored breathing, no respiratory distress  Abd: soft, soft, minimally distended, nontender. Vac in place w/ good suction w/ bilious output in cannister, ostomy ppp w/ output in pouch, isolated EC fistula w/ scant bilious output in pouch and red rubber catheter attached to wall suction, some bilious matter in wound bed. Mucous fistula bilious output in pouch.  Extrem: WWP, no edema, SCDs in place      LABS:                        8.2    13.61 )-----------( 312      ( 26 Jul 2023 05:19 )             25.7     07-26    137  |  108  |  21  ----------------------------<  118<H>  4.8   |  21<L>  |  0.87    Ca    7.3<L>      26 Jul 2023 05:19  Phos  3.0     07-26  Mg     1.9     07-26    TPro  6.7  /  Alb  1.8<L>  /  TBili  4.2<H>  /  DBili  x   /  AST  159<H>  /  ALT  130<H>  /  AlkPhos  121<H>  07-26      Urinalysis Basic - ( 26 Jul 2023 05:19 )    Color: x / Appearance: x / SG: x / pH: x  Gluc: 118 mg/dL / Ketone: x  / Bili: x / Urobili: x   Blood: x / Protein: x / Nitrite: x   Leuk Esterase: x / RBC: x / WBC x   Sq Epi: x / Non Sq Epi: x / Bacteria: x        RADIOLOGY & ADDITIONAL STUDIES:

## 2023-07-26 NOTE — PROGRESS NOTE ADULT - ASSESSMENT
78M w/ Crohn's, AFib/Flutter s/p DCCVs on amiodarone, remote ileocectomy and open appy here for SBO vs Crohns flare, s/p NGT decompression and now s/p lap TRE converted to open TRE, SBR x 3, left in discontinuity with abthera vac on 6/27, RTOR for ileocolic resection, small bowel anastomosis, and abdominal wall closure on 6/28, and s/p IR aspiration of perihepatic fluid on 7/3, stepped down to telemetery on 7/4. wound dehisence on 7/5, RTOR exlap, washout, ileocolic resection with end ileostomy, blow hole colostomy, red rubber from ileostomy to small bowel anastomosis; vicryl bridging mesh on 7/5. Transferred to SICU post op for HD monitoring. Extubated 7/6.    Neuro: Tylenol PRN for pain. Hx anxiety: Continue home venlafaxine  CV: MAP > 65. Hx of Afib/flutter: s/p DCCV, on PO amiodarone, metoprolol. Hx of HTN- continue home losartan. Continue new amlodipine 10 daily. IV Hydralazine PRN for SBP < 140 q4h. Hx of HLD: continue home rosuvastatin; Repeat TTE  (07/18) - PASP 64mmHg, EF 65-70%%. Previously total body overloaded - now euvolemic  Pulm: Extubated 7/6 - now on RA, saturating well. s/p Chest tube by CT surg (6/27--7/3)   GI: Continue CLD. TPN/lipids, wound dehisense -- RTOR on 7/5 for exlap, washout, ileocolic resection with end ileostomy, blow hole colostomy; vicryl bridging mesh explanted and vac replaced - Continue Octreotide and cholestyramine. Total bilirubin downtrending today, LFTs lateral  : Voids.   ID: Blood Cx sent 7/23 - NGTD. Empiric coverage with Meropenum 1g (7/23--) Dapto (7/23--) IR Cx 7/3: C. tertium, Lactobacillis. OR Cx 7/5 - rare yeast - Imipenem (6/30-7/12), Fluconazole (6/30 -7/12) OR Cx 6/28: Vanc-resistant/amp-sens (but allergic) enterococcus s/p CTX (6/26-6/30), Flagyl (6/26-6/30), Dapto (6/30-7/5).  Endo: ISS; Hx of hypothyroid: cont synthroid IV. Gout: holding home allopurionol  PPx: SCD, HSQ.  Lines: PIV x 2, RUE PICC (6/30--), [place] (7/8-7/10) // DC: R Chest tube for iatrogenic PTX (6/27--7/3), R TLC (06/26-30). R TLC (7/5 -7/12)  Wounds/Drains: midline incision; wound vac changes q48h with fistula opening; R JOYCE below ileostomy (D/C 7/17), L JOYCE at small bowel enterotomy repair - to LIWS.  PT/OT: OOB ambulating in castrejon   Dispo: SICU

## 2023-07-26 NOTE — PROGRESS NOTE ADULT - SUBJECTIVE AND OBJECTIVE BOX
INFECTIOUS DISEASES CONSULT FOLLOW-UP NOTE    INTERVAL HPI/OVERNIGHT EVENTS:    Patient seen and examined at bedside. AMRITA. Afebrile. Sitting OOB in chair. Denies complaints.    ROS:   Constitutional, eyes, ENT, cardiovascular, respiratory, gastrointestinal, genitourinary, integumentary, neurological, psychiatric and heme/lymph are otherwise negative other than noted above       ANTIBIOTICS/RELEVANT:    MEDICATIONS  (STANDING):  aMIOdarone    Tablet 200 milliGRAM(s) Oral daily  amLODIPine   Tablet 10 milliGRAM(s) Oral daily  atorvastatin 20 milliGRAM(s) Oral at bedtime  chlorhexidine 2% Cloths 1 Application(s) Topical <User Schedule>  cholestyramine Powder (Sugar-Free) 4 Gram(s) Oral two times a day  dextrose 5%. 1000 milliLiter(s) (50 mL/Hr) IV Continuous <Continuous>  dextrose 5%. 1000 milliLiter(s) (100 mL/Hr) IV Continuous <Continuous>  dextrose 50% Injectable 25 Gram(s) IV Push once  dextrose 50% Injectable 12.5 Gram(s) IV Push once  dextrose 50% Injectable 25 Gram(s) IV Push once  glucagon  Injectable 1 milliGRAM(s) IntraMuscular once  heparin   Injectable 5000 Unit(s) SubCutaneous every 8 hours  imipenem/cilastatin  IVPB 1000 milliGRAM(s) IV Intermittent every 8 hours  insulin lispro (ADMELOG) corrective regimen sliding scale   SubCutaneous every 6 hours  levothyroxine 50 MICROGram(s) Oral daily  lipid, fat emulsion (Fish Oil and Plant Based) 20% Infusion 0.5 Gm/kG/Day (20.83 mL/Hr) IV Continuous <Continuous>  losartan 25 milliGRAM(s) Oral daily  metoprolol tartrate 25 milliGRAM(s) Oral every 12 hours  octreotide  Injectable 100 MICROGram(s) IV Push every 8 hours  pantoprazole  Injectable 40 milliGRAM(s) IV Push every 12 hours  Parenteral Nutrition - Adult 1 Each (96 mL/Hr) TPN Continuous <Continuous>  venlafaxine XR. 300 milliGRAM(s) Oral daily    MEDICATIONS  (PRN):  acetaminophen     Tablet .. 650 milliGRAM(s) Oral every 6 hours PRN Mild Pain (1 - 3)  benzocaine/menthol Lozenge 2 Lozenge Oral every 4 hours PRN Sore Throat  dextrose Oral Gel 15 Gram(s) Oral once PRN Blood Glucose LESS THAN 70 milliGRAM(s)/deciliter  hydrALAZINE Injectable 10 milliGRAM(s) IV Push every 4 hours PRN SBP > 140  melatonin 5 milliGRAM(s) Oral at bedtime PRN Sleep        Vital Signs Last 24 Hrs  T(C): 36.7 (26 Jul 2023 09:12), Max: 37.1 (25 Jul 2023 21:55)  T(F): 98 (26 Jul 2023 09:12), Max: 98.7 (25 Jul 2023 21:55)  HR: 80 (26 Jul 2023 09:00) (76 - 85)  BP: 163/76 (26 Jul 2023 09:00) (128/84 - 176/73)  BP(mean): 109 (26 Jul 2023 09:00) (89 - 110)  RR: 23 (26 Jul 2023 09:00) (23 - 40)  SpO2: 95% (26 Jul 2023 09:00) (92% - 97%)    Parameters below as of 26 Jul 2023 09:00  Patient On (Oxygen Delivery Method): room air        07-25-23 @ 07:01 - 07-26-23 @ 07:00  --------------------------------------------------------  IN: 3250.8 mL / OUT: 2535 mL / NET: 715.8 mL    07-26-23 @ 07:01  -  07-26-23 @ 09:46  --------------------------------------------------------  IN: 288 mL / OUT: 200 mL / NET: 88 mL      PHYSICAL EXAM:  Constitutional: alert, NAD  Eyes: the sclera and conjunctiva were normal.   ENT: the ears and nose were normal in appearance.   Neck: the appearance of the neck was normal and the neck was supple.   Pulmonary: no respiratory distress and lungs were clear to auscultation bilaterally.   Heart: heart rate was normal and rhythm regular, normal S1 and S2  Vascular:. there was no peripheral edema  Abdomen: normal bowel sounds, soft, non-tender, ileostomy bag with minimal serous output. Fistula with wound vac noted.   Neurological: no focal deficits.   Psychiatric: the affect was normal        LABS:                        8.2    13.61 )-----------( 312      ( 26 Jul 2023 05:19 )             25.7     07-26    137  |  108  |  21  ----------------------------<  118<H>  4.8   |  21<L>  |  0.87    Ca    7.3<L>      26 Jul 2023 05:19  Phos  3.0     07-26  Mg     1.9     07-26    TPro  6.7  /  Alb  1.8<L>  /  TBili  4.2<H>  /  DBili  x   /  AST  159<H>  /  ALT  130<H>  /  AlkPhos  121<H>  07-26      Urinalysis Basic - ( 26 Jul 2023 05:19 )    Color: x / Appearance: x / SG: x / pH: x  Gluc: 118 mg/dL / Ketone: x  / Bili: x / Urobili: x   Blood: x / Protein: x / Nitrite: x   Leuk Esterase: x / RBC: x / WBC x   Sq Epi: x / Non Sq Epi: x / Bacteria: x        MICROBIOLOGY:  reviewed     RADIOLOGY & ADDITIONAL STUDIES:  Reviewed

## 2023-07-27 NOTE — PROGRESS NOTE ADULT - ASSESSMENT
78M w/ Crohn's, AFib/Flutter s/p DCCVs on amiodarone, remote ileocectomy and open appy here for SBO vs Crohns flare, s/p NGT decompression and now s/p lap TRE converted to open TRE, SBR x 3, left in discontinuity with abthera vac on 6/27, RTOR for ileocolic resection, small bowel anastomosis, and abdominal wall closure on 6/28, and s/p IR aspiration of perihepatic fluid on 7/3, stepped down to telemetery on 7/4. wound dehisence on 7/5, RTOR exlap, washout, ileocolic resection with end ileostomy, blow hole colostomy, red rubber from ileostomy to small bowel anastomosis; vicryl bridging mesh on 7/5. Transferred to SICU post op for HD monitoring. Extubated 7/6.    Neuro: Tylenol PRN for pain. Hx anxiety: Continue home venlafaxine  CV: MAP > 65. Hx of Afib/flutter: s/p DCCV, on PO amiodarone, metoprolol. Hx of HTN- continue home losartan. Continue new amlodipine 10 daily. IV Hydralazine PRN for SBP < 140 q4h. Hx of HLD: continue home rosuvastatin; Repeat TTE  (07/18) - PASP 64mmHg, EF 65-70%%. Previously total body overloaded - now euvolemic  Pulm: Extubated 7/6 - now on RA, saturating well. s/p Chest tube by CT surg (6/27--7/3)   GI: Continue CLD. TPN/lipids, wound dehisense -- RTOR on 7/5 for exlap, washout, ileocolic resection with end ileostomy, blow hole colostomy; vicryl bridging mesh explanted and vac replaced - Continue Octreotide and cholestyramine. Monitoring LFTs daily  : Voids.   ID: Blood Cx sent 7/23 - NGTD. Empiric coverage with Meropenum 1g (7/23-7/29) -- ID signed off Dapto (7/23-7/24) IR Cx 7/3: C. tertium, Lactobacillis. OR Cx 7/5 - rare yeast - Imipenem (6/30-7/12), Fluconazole (6/30 -7/12) OR Cx 6/28: Vanc-resistant/amp-sens (but allergic) enterococcus s/p CTX (6/26-6/30), Flagyl (6/26-6/30), Dapto (6/30-7/5).  Endo: ISS; Hx of hypothyroid: cont synthroid IV. Gout: holding home allopurionol  PPx: SCD, HSQ.  Lines: PIV x 2, RUE PICC (6/30--), [place] (7/8-7/10) // DC: R Chest tube for iatrogenic PTX (6/27--7/3), R TLC (06/26-30). R TLC (7/5 -7/12)  Wounds/Drains: midline incision; wound vac changes q48h with fistula opening; R JOYCE below ileostomy (D/C 7/17), L JOYCE at small bowel enterotomy repair - to LIWS.  PT/OT: OOB ambulating in castrejon   Dispo: SICU

## 2023-07-27 NOTE — PROGRESS NOTE ADULT - ATTENDING COMMENTS
Crohn's, AF, HTN s/p SBR, ileocolic resection, ileostomy, blowhole colostomy with EC fistula  physical as above  continue impenem  bilirubin continues to decrease  continue TPN  vac change today  amlodipine, losartan, metoprolol, amiodarone

## 2023-07-27 NOTE — CHART NOTE - NSCHARTNOTEFT_GEN_A_CORE
Infectious Diseases Anti-infective Approval Note    Medication: imipenem  Dose: 1g  Route: IV  Frequency: q8h   Duration**: end on 7/29     *THIS IS NOT AN INFECTIOUS DISEASES CONSULTATION*    **Indicates duration of approval, not necessarily duration of treatment**

## 2023-07-27 NOTE — PROGRESS NOTE ADULT - SUBJECTIVE AND OBJECTIVE BOX
ON: Hydralazine given x1 for SBP > 160.       SUBJECTIVE: Pt seen and examined at bedside this am by ICU team. Patient is lying comfortably in bed with no complaints. States he feels well this AM. Denies nausea/emesis.       MEDICATIONS  (STANDING):  aMIOdarone    Tablet 200 milliGRAM(s) Oral daily  amLODIPine   Tablet 10 milliGRAM(s) Oral daily  atorvastatin 20 milliGRAM(s) Oral at bedtime  chlorhexidine 2% Cloths 1 Application(s) Topical <User Schedule>  cholestyramine Powder (Sugar-Free) 4 Gram(s) Oral two times a day  dextrose 5%. 1000 milliLiter(s) (50 mL/Hr) IV Continuous <Continuous>  dextrose 5%. 1000 milliLiter(s) (100 mL/Hr) IV Continuous <Continuous>  dextrose 50% Injectable 25 Gram(s) IV Push once  dextrose 50% Injectable 25 Gram(s) IV Push once  dextrose 50% Injectable 12.5 Gram(s) IV Push once  glucagon  Injectable 1 milliGRAM(s) IntraMuscular once  heparin   Injectable 5000 Unit(s) SubCutaneous every 8 hours  imipenem/cilastatin  IVPB 1000 milliGRAM(s) IV Intermittent every 8 hours  insulin lispro (ADMELOG) corrective regimen sliding scale   SubCutaneous every 6 hours  levothyroxine 50 MICROGram(s) Oral daily  lidocaine 2% Injectable 10 milliLiter(s) Local Injection once  lipid, fat emulsion (Fish Oil and Plant Based) 20% Infusion 0.5 Gm/kG/Day (20.83 mL/Hr) IV Continuous <Continuous>  losartan 25 milliGRAM(s) Oral daily  metoprolol tartrate 25 milliGRAM(s) Oral every 12 hours  octreotide  Injectable 100 MICROGram(s) IV Push every 8 hours  pantoprazole  Injectable 40 milliGRAM(s) IV Push every 12 hours  Parenteral Nutrition - Adult 1 Each (96 mL/Hr) TPN Continuous <Continuous>  venlafaxine XR. 300 milliGRAM(s) Oral daily    MEDICATIONS  (PRN):  acetaminophen     Tablet .. 650 milliGRAM(s) Oral every 6 hours PRN Mild Pain (1 - 3)  benzocaine/menthol Lozenge 2 Lozenge Oral every 4 hours PRN Sore Throat  dextrose Oral Gel 15 Gram(s) Oral once PRN Blood Glucose LESS THAN 70 milliGRAM(s)/deciliter  hydrALAZINE Injectable 10 milliGRAM(s) IV Push every 4 hours PRN SBP > 140  melatonin 5 milliGRAM(s) Oral at bedtime PRN Sleep      ICU Vital Signs Last 24 Hrs  T(C): 36.6 (27 Jul 2023 05:01), Max: 37.2 (26 Jul 2023 21:55)  T(F): 97.8 (27 Jul 2023 05:01), Max: 98.9 (26 Jul 2023 21:55)  HR: 81 (27 Jul 2023 07:00) (76 - 82)  BP: 156/74 (27 Jul 2023 07:00) (133/63 - 171/78)  BP(mean): 106 (27 Jul 2023 07:00) (90 - 115)  RR: 27 (27 Jul 2023 07:00) (20 - 40)  SpO2: 97% (27 Jul 2023 07:00) (95% - 98%)    O2 Parameters below as of 27 Jul 2023 07:00  Patient On (Oxygen Delivery Method): room air    Physical Exam:  General: NAD  HEENT: NC/AT, EOMI, PERRLA, normal hearing, no oral lesions, neck supple w/o LAD  Pulmonary: Nonlabored breathing, no respiratory distress, CTA-B  Cardiovascular: NSR, no murmurs  Abdominal: soft, NT/ND, +BS, no organomegaly  Extremities: WWP, 5/5 strength x 4, no clubbing/cyanosis/edema  Neuro: A/O x3, CNs II-XII grossly intact, normal motor/sensation, no focal deficits  Pulses: palpable distal pulses    Lines/tubes/drains: JOYCE x1, ostomy, wound vac    I&O's Summary    26 Jul 2023 07:01  -  27 Jul 2023 07:00  --------------------------------------------------------  IN: 4664.4 mL / OUT: 3135 mL / NET: 1529.4 mL    27 Jul 2023 07:01  -  27 Jul 2023 08:23  --------------------------------------------------------  IN: 96 mL / OUT: 0 mL / NET: 96 mL        LABS:                        8.5    13.53 )-----------( 296      ( 27 Jul 2023 06:28 )             27.1     07-27    134<L>  |  103  |  21  ----------------------------<  121<H>  4.2   |  24  |  0.81    Ca    7.4<L>      27 Jul 2023 06:28  Phos  3.0     07-27  Mg     2.0     07-27    TPro  7.2  /  Alb  2.3<L>  /  TBili  4.3<H>  /  DBili  x   /  AST  188<H>  /  ALT  130<H>  /  AlkPhos  147<H>  07-27      Urinalysis Basic - ( 27 Jul 2023 06:28 )    Color: x / Appearance: x / SG: x / pH: x  Gluc: 121 mg/dL / Ketone: x  / Bili: x / Urobili: x   Blood: x / Protein: x / Nitrite: x   Leuk Esterase: x / RBC: x / WBC x   Sq Epi: x / Non Sq Epi: x / Bacteria: x      CAPILLARY BLOOD GLUCOSE      POCT Blood Glucose.: 118 mg/dL (27 Jul 2023 06:23)  POCT Blood Glucose.: 123 mg/dL (26 Jul 2023 22:41)  POCT Blood Glucose.: 119 mg/dL (26 Jul 2023 17:45)  POCT Blood Glucose.: 128 mg/dL (26 Jul 2023 11:26)    LIVER FUNCTIONS - ( 27 Jul 2023 06:28 )  Alb: 2.3 g/dL / Pro: 7.2 g/dL / ALK PHOS: 147 U/L / ALT: 130 U/L / AST: 188 U/L / GGT: x             Cultures:    RADIOLOGY & ADDITIONAL STUDIES:     ON: Hydralazine given x1 for SBP > 160.       SUBJECTIVE: Pt seen and examined at bedside this am by ICU team. Patient is lying comfortably in bed with no complaints. States he feels well this AM. Denies nausea/emesis.       MEDICATIONS  (STANDING):  aMIOdarone    Tablet 200 milliGRAM(s) Oral daily  amLODIPine   Tablet 10 milliGRAM(s) Oral daily  atorvastatin 20 milliGRAM(s) Oral at bedtime  chlorhexidine 2% Cloths 1 Application(s) Topical <User Schedule>  cholestyramine Powder (Sugar-Free) 4 Gram(s) Oral two times a day  dextrose 5%. 1000 milliLiter(s) (50 mL/Hr) IV Continuous <Continuous>  dextrose 5%. 1000 milliLiter(s) (100 mL/Hr) IV Continuous <Continuous>  dextrose 50% Injectable 25 Gram(s) IV Push once  dextrose 50% Injectable 25 Gram(s) IV Push once  dextrose 50% Injectable 12.5 Gram(s) IV Push once  glucagon  Injectable 1 milliGRAM(s) IntraMuscular once  heparin   Injectable 5000 Unit(s) SubCutaneous every 8 hours  imipenem/cilastatin  IVPB 1000 milliGRAM(s) IV Intermittent every 8 hours  insulin lispro (ADMELOG) corrective regimen sliding scale   SubCutaneous every 6 hours  levothyroxine 50 MICROGram(s) Oral daily  lidocaine 2% Injectable 10 milliLiter(s) Local Injection once  lipid, fat emulsion (Fish Oil and Plant Based) 20% Infusion 0.5 Gm/kG/Day (20.83 mL/Hr) IV Continuous <Continuous>  losartan 25 milliGRAM(s) Oral daily  metoprolol tartrate 25 milliGRAM(s) Oral every 12 hours  octreotide  Injectable 100 MICROGram(s) IV Push every 8 hours  pantoprazole  Injectable 40 milliGRAM(s) IV Push every 12 hours  Parenteral Nutrition - Adult 1 Each (96 mL/Hr) TPN Continuous <Continuous>  venlafaxine XR. 300 milliGRAM(s) Oral daily    MEDICATIONS  (PRN):  acetaminophen     Tablet .. 650 milliGRAM(s) Oral every 6 hours PRN Mild Pain (1 - 3)  benzocaine/menthol Lozenge 2 Lozenge Oral every 4 hours PRN Sore Throat  dextrose Oral Gel 15 Gram(s) Oral once PRN Blood Glucose LESS THAN 70 milliGRAM(s)/deciliter  hydrALAZINE Injectable 10 milliGRAM(s) IV Push every 4 hours PRN SBP > 140  melatonin 5 milliGRAM(s) Oral at bedtime PRN Sleep      ICU Vital Signs Last 24 Hrs  T(C): 36.6 (27 Jul 2023 05:01), Max: 37.2 (26 Jul 2023 21:55)  T(F): 97.8 (27 Jul 2023 05:01), Max: 98.9 (26 Jul 2023 21:55)  HR: 81 (27 Jul 2023 07:00) (76 - 82)  BP: 156/74 (27 Jul 2023 07:00) (133/63 - 171/78)  BP(mean): 106 (27 Jul 2023 07:00) (90 - 115)  RR: 27 (27 Jul 2023 07:00) (20 - 40)  SpO2: 97% (27 Jul 2023 07:00) (95% - 98%)    O2 Parameters below as of 27 Jul 2023 07:00  Patient On (Oxygen Delivery Method): room air    Physical Exam:  General: Resting in bed, NAD, mild scleral icterus  HEENT: NC/AT, EOMI, PERRLA, normal hearing, no oral lesions, neck supple w/o LAD, no JVD  Pulmonary: Nonlabored breathing, no respiratory distress, CTA-B  Cardiovascular: NSR, no murmurs  Abdominal: soft, nondistended, nontender. JOYCE in L abdomen bilious to LIWS. Vac in place with brown output. Ostomy in R abdomen with bilious output and minimal gas   Extremities: WWP, 5/5 strength x 4, no clubbing/cyanosis/edema  Neuro: A/O x3, CNs II-XII grossly intact, normal motor/sensation, no focal deficits  Pulses: palpable distal pulses    Lines/tubes/drains: JOYCE x1, ostomy, wound vac    I&O's Summary    26 Jul 2023 07:01  -  27 Jul 2023 07:00  --------------------------------------------------------  IN: 4664.4 mL / OUT: 3135 mL / NET: 1529.4 mL    27 Jul 2023 07:01  -  27 Jul 2023 08:23  --------------------------------------------------------  IN: 96 mL / OUT: 0 mL / NET: 96 mL        LABS:                        8.5    13.53 )-----------( 296      ( 27 Jul 2023 06:28 )             27.1     07-27    134<L>  |  103  |  21  ----------------------------<  121<H>  4.2   |  24  |  0.81    Ca    7.4<L>      27 Jul 2023 06:28  Phos  3.0     07-27  Mg     2.0     07-27    TPro  7.2  /  Alb  2.3<L>  /  TBili  4.3<H>  /  DBili  x   /  AST  188<H>  /  ALT  130<H>  /  AlkPhos  147<H>  07-27      Urinalysis Basic - ( 27 Jul 2023 06:28 )    Color: x / Appearance: x / SG: x / pH: x  Gluc: 121 mg/dL / Ketone: x  / Bili: x / Urobili: x   Blood: x / Protein: x / Nitrite: x   Leuk Esterase: x / RBC: x / WBC x   Sq Epi: x / Non Sq Epi: x / Bacteria: x      CAPILLARY BLOOD GLUCOSE      POCT Blood Glucose.: 118 mg/dL (27 Jul 2023 06:23)  POCT Blood Glucose.: 123 mg/dL (26 Jul 2023 22:41)  POCT Blood Glucose.: 119 mg/dL (26 Jul 2023 17:45)  POCT Blood Glucose.: 128 mg/dL (26 Jul 2023 11:26)    LIVER FUNCTIONS - ( 27 Jul 2023 06:28 )  Alb: 2.3 g/dL / Pro: 7.2 g/dL / ALK PHOS: 147 U/L / ALT: 130 U/L / AST: 188 U/L / GGT: x             Cultures:    RADIOLOGY & ADDITIONAL STUDIES:

## 2023-07-27 NOTE — PROGRESS NOTE ADULT - ASSESSMENT
Stable overall  Wound change today  Cont OOB and increase ambulation as tolerated/ incentive spirometer

## 2023-07-27 NOTE — PROGRESS NOTE ADULT - SUBJECTIVE AND OBJECTIVE BOX
INFECTIOUS DISEASES CONSULT FOLLOW-UP NOTE    INTERVAL HPI/OVERNIGHT EVENTS:    Patient seen and examined at bedside. AMRITA. Afebrile. Wound vac changed this AM. Denies complaints.    ROS:   Constitutional, eyes, ENT, cardiovascular, respiratory, gastrointestinal, genitourinary, integumentary, neurological, psychiatric and heme/lymph are otherwise negative other than noted above       ANTIBIOTICS/RELEVANT:    MEDICATIONS  (STANDING):  aMIOdarone    Tablet 200 milliGRAM(s) Oral daily  amLODIPine   Tablet 10 milliGRAM(s) Oral daily  atorvastatin 20 milliGRAM(s) Oral at bedtime  chlorhexidine 2% Cloths 1 Application(s) Topical <User Schedule>  cholestyramine Powder (Sugar-Free) 4 Gram(s) Oral two times a day  dextrose 5%. 1000 milliLiter(s) (50 mL/Hr) IV Continuous <Continuous>  dextrose 5%. 1000 milliLiter(s) (100 mL/Hr) IV Continuous <Continuous>  dextrose 50% Injectable 25 Gram(s) IV Push once  dextrose 50% Injectable 12.5 Gram(s) IV Push once  dextrose 50% Injectable 25 Gram(s) IV Push once  glucagon  Injectable 1 milliGRAM(s) IntraMuscular once  heparin   Injectable 5000 Unit(s) SubCutaneous every 8 hours  imipenem/cilastatin  IVPB 1000 milliGRAM(s) IV Intermittent every 8 hours  insulin lispro (ADMELOG) corrective regimen sliding scale   SubCutaneous every 6 hours  levothyroxine 50 MICROGram(s) Oral daily  lidocaine 2% Injectable 10 milliLiter(s) Local Injection once  lipid, fat emulsion (Fish Oil and Plant Based) 20% Infusion 0.5 Gm/kG/Day (20.83 mL/Hr) IV Continuous <Continuous>  losartan 25 milliGRAM(s) Oral daily  metoprolol tartrate 25 milliGRAM(s) Oral every 12 hours  octreotide  Injectable 100 MICROGram(s) IV Push every 8 hours  pantoprazole  Injectable 40 milliGRAM(s) IV Push every 12 hours  Parenteral Nutrition - Adult 1 Each (96 mL/Hr) TPN Continuous <Continuous>  Parenteral Nutrition - Adult 1 Each (96 mL/Hr) TPN Continuous <Continuous>  venlafaxine XR. 300 milliGRAM(s) Oral daily    MEDICATIONS  (PRN):  acetaminophen     Tablet .. 650 milliGRAM(s) Oral every 6 hours PRN Mild Pain (1 - 3)  benzocaine/menthol Lozenge 2 Lozenge Oral every 4 hours PRN Sore Throat  dextrose Oral Gel 15 Gram(s) Oral once PRN Blood Glucose LESS THAN 70 milliGRAM(s)/deciliter  hydrALAZINE Injectable 10 milliGRAM(s) IV Push every 4 hours PRN SBP > 140  melatonin 5 milliGRAM(s) Oral at bedtime PRN Sleep        Vital Signs Last 24 Hrs  T(C): 36.4 (27 Jul 2023 09:28), Max: 37.2 (26 Jul 2023 21:55)  T(F): 97.6 (27 Jul 2023 09:28), Max: 98.9 (26 Jul 2023 21:55)  HR: 81 (27 Jul 2023 10:00) (76 - 82)  BP: 173/79 (27 Jul 2023 10:00) (133/63 - 173/79)  BP(mean): 114 (27 Jul 2023 10:00) (90 - 115)  RR: 24 (27 Jul 2023 10:00) (20 - 40)  SpO2: 97% (27 Jul 2023 10:00) (95% - 98%)    Parameters below as of 27 Jul 2023 10:00  Patient On (Oxygen Delivery Method): room air        07-26-23 @ 07:01 - 07-27-23 @ 07:00  --------------------------------------------------------  IN: 4664.4 mL / OUT: 3135 mL / NET: 1529.4 mL    07-27-23 @ 07:01  -  07-27-23 @ 11:31  --------------------------------------------------------  IN: 96 mL / OUT: 0 mL / NET: 96 mL      PHYSICAL EXAM:  Constitutional: alert, NAD  Eyes: the sclera and conjunctiva were normal.   ENT: the ears and nose were normal in appearance.   Neck: the appearance of the neck was normal and the neck was supple.   Pulmonary: no respiratory distress and lungs were clear to auscultation bilaterally.   Heart: heart rate was normal and rhythm regular, normal S1 and S2  Vascular:. there was no peripheral edema  Abdomen: normal bowel sounds, soft, non-tender, ileostomy bag with minimal serous output. Fistula with wound vac noted.   Neurological: no focal deficits.   Psychiatric: the affect was normal        LABS:                        8.5    13.53 )-----------( 296      ( 27 Jul 2023 06:28 )             27.1     07-27    134<L>  |  103  |  21  ----------------------------<  121<H>  4.2   |  24  |  0.81    Ca    7.4<L>      27 Jul 2023 06:28  Phos  3.0     07-27  Mg     2.0     07-27    TPro  7.2  /  Alb  2.3<L>  /  TBili  4.3<H>  /  DBili  x   /  AST  188<H>  /  ALT  130<H>  /  AlkPhos  147<H>  07-27      Urinalysis Basic - ( 27 Jul 2023 06:28 )    Color: x / Appearance: x / SG: x / pH: x  Gluc: 121 mg/dL / Ketone: x  / Bili: x / Urobili: x   Blood: x / Protein: x / Nitrite: x   Leuk Esterase: x / RBC: x / WBC x   Sq Epi: x / Non Sq Epi: x / Bacteria: x        MICROBIOLOGY:  reviewed     RADIOLOGY & ADDITIONAL STUDIES:  Reviewed

## 2023-07-27 NOTE — PROGRESS NOTE ADULT - ASSESSMENT
78M POD30 laparoscopic lysis of adhesions, converted to open lysis of adhesions, SBR x 3, temporary abdominal closure, POD29 ex lap, removal of abthera, ileocolic resection, small bowel anastomosis c/b anastomotic leak resulting in wound dehiscence now POD22 RTOR exlap, washout, ileocolic resection with end ileostomy, blow hole colostomy, fistula, red rubber from ileostomy to small bowel anastomosis; vicryl bridging mesh; R JOYCE below ileostomy, L JOYCE at small bowel enterotomy repair.    Vac change today, please premedicate @ 0945  Please change vac cannister q6h  Encourage ambulation, OOBTC  Closely monitor vac output  Appreciate excellent SICU care

## 2023-07-27 NOTE — PROGRESS NOTE ADULT - NS ATTEND AMEND GEN_ALL_CORE FT
#Acute acalculus cholecystitis, sepsis    Afebrile, patient feeling well, denied n/v/abdominal pain.  Both Tbili and WBC remains stable.  Per SICU team, no plan for intervention.  Cont imipenem 1g IV q8h for total 7 days (end 7/27) and then mointor offa bx.  f/u BCx.      Thank you for your consult.  Please re-consult us or call us with questions.  Case d/w primary team.    Angie Frye MD, MS  Infectious Disease attending  work cell [phone]  For any questions during evening/weekend/holiday, please page ID on call #Acute acalculus cholecystitis, sepsis    Afebrile, patient feeling well, denied n/v/abdominal pain.  Both Tbili and WBC remains stable.  Per SICU team, no plan for intervention.  Cont imipenem 1g IV q8h for total 7 days (end 7/29) and then mointor offa bx.  f/u BCx.      Thank you for your consult.  Please re-consult us or call us with questions.  Case d/w primary team.    Angie Frye MD, MS  Infectious Disease attending  work cell [phone]  For any questions during evening/weekend/holiday, please page ID on call

## 2023-07-27 NOTE — PROGRESS NOTE ADULT - ASSESSMENT
78M h/o Crohn's disease, ileocecectomy, open appy, AFib/Aflutter s/p multiple DCCV p/w acute onset of abdominal pain on 6/23, found to have SBO.  S/p ex-lap, open TRE, resection of small bowell on 6/26. Course c/b pneumothorax s/p R pigtail placement on 6/27, RTOR on 6/28 s/p ileocolic resection with anastomosis, small bowel anastomosis and abdominal wall reconstruction and washout. 6/28 body fluid grow E. casseliflavus. 07/05 RTOR for surgical site dehiscence, was taken back emergently to OR for exlap, found to have anastomotic leak s/p exlap with washout and ileostomy/colostomy creation s/p imipenem/fluconazole for polymicrobial anastomotic leak/feculent peritonitis completed 7/11. Pt had new fever and worsening leukocytosis started on javier/dapto 7/23. BCxs ngtd and UA negative. CT chest with atelectasis, findings not convincing for pneumonia. CT A/P with distended gallbladder with layering sludge and elevated bilirubin, transaminitis on labs c/f acalculous cholecystitis. Per primary team, as bilirubin is stable will not place C tube at this time. Afebrile, WBC stable and total bili downtrending.     Suggest:  -continue to trend WBC and bilirubin/LFTs  -cont imipenem 1g IV q8h   -consider US of RUQ or HIDA scan for definitive diagnosis     Team 2 will follow you.    Case d/w primary team.  Final recommendation pending attending note.    Nabila Mcfarland, Infectious Diseases PA  Please reach out for any questions 9 am-5pm. For evenings and weekends, please call the ID physician on call.  Work cell: [phone] 78M h/o Crohn's disease, ileocecectomy, open appy, AFib/Aflutter s/p multiple DCCV p/w acute onset of abdominal pain on 6/23, found to have SBO.  S/p ex-lap, open TRE, resection of small bowell on 6/26. Course c/b pneumothorax s/p R pigtail placement on 6/27, RTOR on 6/28 s/p ileocolic resection with anastomosis, small bowel anastomosis and abdominal wall reconstruction and washout. 6/28 body fluid grow E. casseliflavus. 07/05 RTOR for surgical site dehiscence, was taken back emergently to OR for exlap, found to have anastomotic leak s/p exlap with washout and ileostomy/colostomy creation s/p imipenem/fluconazole for polymicrobial anastomotic leak/feculent peritonitis completed 7/11. Pt had new fever and worsening leukocytosis started on javier/dapto 7/23. BCxs ngtd and UA negative. CT chest with atelectasis, findings not convincing for pneumonia. CT A/P with distended gallbladder with layering sludge and elevated bilirubin, transaminitis on labs c/f acalculous cholecystitis. Per primary team, as bilirubin is stable will not place C tube at this time. Afebrile, WBC stable and total bili downtrending.     Suggest:  -continue to trend WBC and bilirubin/LFTs  -cont imipenem 1g IV q8h through 7/29 then observe off antibiotics     Team 2 signing off. Thank you for your consultation   Please reconsult with questions.   Case d/w primary team.  Final recommendation pending attending note.    Nabila Mcfarland, Infectious Diseases PA  Please reach out for any questions 9 am-5pm. For evenings and weekends, please call the ID physician on call.  Work cell: [phone]

## 2023-07-27 NOTE — PROGRESS NOTE ADULT - SUBJECTIVE AND OBJECTIVE BOX
INTERVAL HPI/OVERNIGHT EVENTS: Hydralazine given x1 for SBP > 160.     STATUS POST:    6/27: Laparoscopic lysis of adhesions, converted to open lysis of adhesions, SBR x 3, temporary abdominal closure, 5L cryst, 1L 5% alb, 3 pRBC, 1 FFP, 1 plts  6/28: ex lap, removal of abthera, ileocolic resection, small bowel anastamosis, , 1.6 crystalloid, 250 5%, 175 uop   7/3: IR Gendel drained perihepatic aspiration of serous fluid.   7/5: RTOR exlap, washout, ileocolic resection with end ileostomy, blow hole colostomy, fistula, red rubber from ileostomy to small bowel anastomosis; vicryl bridging mesh; R JOYCE below ileostomy, L JOYCE at small bowel enterotomy repair; 500 LR, 500 5% albumin, 3u PRBC, 2 FFP, 400 UOP,     SUBJECTIVE: Patient seen and examined at bedside. In good spirits, denies abdominal pain, tolerating PO without n/v.      aMIOdarone    Tablet 200 milliGRAM(s) Oral daily  amLODIPine   Tablet 10 milliGRAM(s) Oral daily  heparin   Injectable 5000 Unit(s) SubCutaneous every 8 hours  hydrALAZINE Injectable 10 milliGRAM(s) IV Push every 4 hours PRN  imipenem/cilastatin  IVPB 1000 milliGRAM(s) IV Intermittent every 8 hours  losartan 25 milliGRAM(s) Oral daily  metoprolol tartrate 25 milliGRAM(s) Oral every 12 hours      Vital Signs Last 24 Hrs  T(C): 36.6 (27 Jul 2023 05:01), Max: 37.2 (26 Jul 2023 21:55)  T(F): 97.8 (27 Jul 2023 05:01), Max: 98.9 (26 Jul 2023 21:55)  HR: 81 (27 Jul 2023 07:00) (76 - 82)  BP: 156/74 (27 Jul 2023 07:00) (133/63 - 171/78)  BP(mean): 106 (27 Jul 2023 07:00) (90 - 115)  RR: 27 (27 Jul 2023 07:00) (20 - 40)  SpO2: 97% (27 Jul 2023 07:00) (95% - 98%)    Parameters below as of 27 Jul 2023 07:00  Patient On (Oxygen Delivery Method): room air      I&O's Detail    26 Jul 2023 07:01  -  27 Jul 2023 07:00  --------------------------------------------------------  IN:    Fat Emulsion (Fish Oil &amp; Plant Based) 20% Infusion: 270.4 mL    IV PiggyBack: 250 mL    IV PiggyBack: 250 mL    Oral Fluid: 1590 mL    TPN (Total Parenteral Nutrition): 2304 mL  Total IN: 4664.4 mL    OUT:    Bulb (mL): 25 mL    Drain (mL): 200 mL    Drain (mL): 100 mL    Ileostomy (mL): 80 mL    VAC (Vacuum Assisted Closure) System (mL): 550 mL    Voided (mL): 2180 mL  Total OUT: 3135 mL    Total NET: 1529.4 mL      27 Jul 2023 07:01  -  27 Jul 2023 08:16  --------------------------------------------------------  IN:    TPN (Total Parenteral Nutrition): 96 mL  Total IN: 96 mL    OUT:    Fat Emulsion (Fish Oil &amp; Plant Based) 20% Infusion: 0 mL  Total OUT: 0 mL    Total NET: 96 mL          General: NAD, resting comfortably in bed  C/V: NSR  Pulm: Nonlabored breathing, no respiratory distress  Abd: soft, soft, minimally distended, nontender. Vac in place w/ good suction w/ bilious output in cannister, ostomy ppp w/ output in pouch, isolated EC fistula w/ scant bilious output in pouch and red rubber catheter attached to wall suction, some bilious matter in wound bed. Mucous fistula bilious output in pouch.  Extrem: WWP, no edema, SCDs in place        LABS:                        8.5    13.53 )-----------( 296      ( 27 Jul 2023 06:28 )             27.1     07-27    134<L>  |  103  |  21  ----------------------------<  121<H>  4.2   |  24  |  0.81    Ca    7.4<L>      27 Jul 2023 06:28  Phos  3.0     07-27  Mg     2.0     07-27    TPro  7.2  /  Alb  2.3<L>  /  TBili  4.3<H>  /  DBili  x   /  AST  188<H>  /  ALT  130<H>  /  AlkPhos  147<H>  07-27      Urinalysis Basic - ( 27 Jul 2023 06:28 )    Color: x / Appearance: x / SG: x / pH: x  Gluc: 121 mg/dL / Ketone: x  / Bili: x / Urobili: x   Blood: x / Protein: x / Nitrite: x   Leuk Esterase: x / RBC: x / WBC x   Sq Epi: x / Non Sq Epi: x / Bacteria: x        RADIOLOGY & ADDITIONAL STUDIES:

## 2023-07-27 NOTE — PROGRESS NOTE ADULT - SUBJECTIVE AND OBJECTIVE BOX
SUBJECTIVE: Patient seen at bed side, tolerating clears. Received hydrazine overnight for high blood pressure.     MEDICATIONS  (STANDING):  aMIOdarone    Tablet 200 milliGRAM(s) Oral daily  amLODIPine   Tablet 10 milliGRAM(s) Oral daily  atorvastatin 20 milliGRAM(s) Oral at bedtime  chlorhexidine 2% Cloths 1 Application(s) Topical <User Schedule>  cholestyramine Powder (Sugar-Free) 4 Gram(s) Oral two times a day  dextrose 5%. 1000 milliLiter(s) (50 mL/Hr) IV Continuous <Continuous>  dextrose 5%. 1000 milliLiter(s) (100 mL/Hr) IV Continuous <Continuous>  dextrose 50% Injectable 25 Gram(s) IV Push once  dextrose 50% Injectable 12.5 Gram(s) IV Push once  dextrose 50% Injectable 25 Gram(s) IV Push once  glucagon  Injectable 1 milliGRAM(s) IntraMuscular once  heparin   Injectable 5000 Unit(s) SubCutaneous every 8 hours  imipenem/cilastatin  IVPB 1000 milliGRAM(s) IV Intermittent every 8 hours  insulin lispro (ADMELOG) corrective regimen sliding scale   SubCutaneous every 6 hours  levothyroxine 50 MICROGram(s) Oral daily  lidocaine 2% Injectable 10 milliLiter(s) Local Injection once  lipid, fat emulsion (Fish Oil and Plant Based) 20% Infusion 0.5 Gm/kG/Day (20.83 mL/Hr) IV Continuous <Continuous>  lipid, fat emulsion (Fish Oil and Plant Based) 20% Infusion 0.5 Gm/kG/Day (20.83 mL/Hr) IV Continuous <Continuous>  losartan 25 milliGRAM(s) Oral daily  metoprolol tartrate 25 milliGRAM(s) Oral every 12 hours  octreotide  Injectable 100 MICROGram(s) IV Push every 8 hours  pantoprazole  Injectable 40 milliGRAM(s) IV Push every 12 hours  Parenteral Nutrition - Adult 1 Each (96 mL/Hr) TPN Continuous <Continuous>  Parenteral Nutrition - Adult 1 Each (96 mL/Hr) TPN Continuous <Continuous>  venlafaxine XR. 300 milliGRAM(s) Oral daily    MEDICATIONS  (PRN):  acetaminophen     Tablet .. 650 milliGRAM(s) Oral every 6 hours PRN Mild Pain (1 - 3)  benzocaine/menthol Lozenge 2 Lozenge Oral every 4 hours PRN Sore Throat  dextrose Oral Gel 15 Gram(s) Oral once PRN Blood Glucose LESS THAN 70 milliGRAM(s)/deciliter  hydrALAZINE Injectable 10 milliGRAM(s) IV Push every 4 hours PRN SBP > 140  melatonin 5 milliGRAM(s) Oral at bedtime PRN Sleep      Drips:     ICU Vital Signs Last 24 Hrs  T(C): 36.6 (27 Jul 2023 05:01), Max: 37.2 (26 Jul 2023 21:55)  T(F): 97.8 (27 Jul 2023 05:01), Max: 98.9 (26 Jul 2023 21:55)  HR: 81 (27 Jul 2023 07:00) (76 - 82)  BP: 156/74 (27 Jul 2023 07:00) (133/63 - 171/78)  BP(mean): 106 (27 Jul 2023 07:00) (90 - 115)  ABP: --  ABP(mean): --  RR: 27 (27 Jul 2023 07:00) (20 - 40)  SpO2: 97% (27 Jul 2023 07:00) (95% - 98%)    O2 Parameters below as of 27 Jul 2023 07:00  Patient On (Oxygen Delivery Method): room air      Physical exam  General: NAD, resting comfortably in bed  C/V: NSR  Pulm: Nonlabored breathing, no respiratory distress  Abd: soft, nondistended, midline laparotomy w/ vac in place w/ good suction, bilious brown enteric output in cannister. Ostomy ppp w/ gas and brown output in pouch. Mucous fistula appreciated. R JOYCE w/ scant serous output, L JOYCE = w/ bilious output.  Extrem: WWP, no edema, SCDs in place      Lines/tubes/drains:  [place]:	      Vent settings:      I&O's Summary    26 Jul 2023 07:01  -  27 Jul 2023 07:00  --------------------------------------------------------  IN: 4664.4 mL / OUT: 3135 mL / NET: 1529.4 mL    27 Jul 2023 07:01  -  27 Jul 2023 08:29  --------------------------------------------------------  IN: 96 mL / OUT: 0 mL / NET: 96 mL        LABS:                        8.5    13.53 )-----------( 296      ( 27 Jul 2023 06:28 )             27.1     07-27    134<L>  |  103  |  21  ----------------------------<  121<H>  4.2   |  24  |  0.81    Ca    7.4<L>      27 Jul 2023 06:28  Phos  3.0     07-27  Mg     2.0     07-27    TPro  7.2  /  Alb  2.3<L>  /  TBili  4.3<H>  /  DBili  x   /  AST  188<H>  /  ALT  130<H>  /  AlkPhos  147<H>  07-27      Urinalysis Basic - ( 27 Jul 2023 06:28 )    Color: x / Appearance: x / SG: x / pH: x  Gluc: 121 mg/dL / Ketone: x  / Bili: x / Urobili: x   Blood: x / Protein: x / Nitrite: x   Leuk Esterase: x / RBC: x / WBC x   Sq Epi: x / Non Sq Epi: x / Bacteria: x      CAPILLARY BLOOD GLUCOSE      POCT Blood Glucose.: 118 mg/dL (27 Jul 2023 06:23)  POCT Blood Glucose.: 123 mg/dL (26 Jul 2023 22:41)  POCT Blood Glucose.: 119 mg/dL (26 Jul 2023 17:45)  POCT Blood Glucose.: 128 mg/dL (26 Jul 2023 11:26)    LIVER FUNCTIONS - ( 27 Jul 2023 06:28 )  Alb: 2.3 g/dL / Pro: 7.2 g/dL / ALK PHOS: 147 U/L / ALT: 130 U/L / AST: 188 U/L / GGT: x

## 2023-07-27 NOTE — PROGRESS NOTE ADULT - ASSESSMENT
78M S/P laparoscopic lysis of adhesions, converted to open lysis of adhesions, SBR x 3, temporary abdominal closure, S/P ex lap, removal of abthera, ileocolic resection, small bowel anastomosis c/b anastomotic leak resulting in wound dehiscence now POD8 RTOR exlap, washout, ileocolic resection with end ileostomy, blow hole colostomy, fistula, red rubber from ileostomy to small bowel anastomosis (removed); vicryl bridging mesh; R JOYCE below ileostomy, L JOYCE at small bowel enterotomy repair. Midline wound with vac in place, enteric content, bile and some blood noted on cannister. Wound vac changed, mesh removed. RUQ US 0.4 gallbladder wall, no evidence of portal or hepatic venous thrombosis. Fluid and sludge distended gallbladder. Patient noted to be more drowsy on 7/23, work up done, CT head unremarkable. Doing well tolerating clears, wound vac holding suction, bilious output.     - Wound vac change today   - Continue excellent SICU care   - Plan discussed with Dr Peterson    78M S/P laparoscopic lysis of adhesions, converted to open lysis of adhesions, SBR x 3, temporary abdominal closure, S/P ex lap, removal of abthera, ileocolic resection, small bowel anastomosis c/b anastomotic leak resulting in wound dehiscence now POD8 RTOR exlap, washout, ileocolic resection with end ileostomy, blow hole colostomy, fistula, red rubber from ileostomy to small bowel anastomosis (removed); vicryl bridging mesh; R JOYCE below ileostomy, L JOYCE at small bowel enterotomy repair. Midline wound with vac in place, enteric content, bile and some blood noted on cannister. Wound vac changed, mesh removed. RUQ US 0.4 gallbladder wall, no evidence of portal or hepatic venous thrombosis. Fluid and sludge distended gallbladder. Patient noted to be more drowsy on 7/23, work up done, CT head unremarkable. Doing well tolerating clears, wound vac holding suction, bilious output.     Note: Wound Vac Changed today with wound care team and Dr Knapp. Clean wound measuring 18 cm W x 22 cm H x 5 cm D, good granulation tissue, dissolving remnants of Vicryl mesh noted and possible pills residue. Wound irrigated with NS and vashe wash, fistula isolated from wound vac with ostomy supplies. Red ruber placed in fistula. Left drain stitched placed.       - Continue excellent SICU care   - Plan discussed with Dr Peterson

## 2023-07-27 NOTE — PROGRESS NOTE ADULT - SUBJECTIVE AND OBJECTIVE BOX
Pt seen and examined   no major change    REVIEW OF SYSTEMS:  Constitutional: No fever,   Cardiovascular: No chest pain, palpitations, dizziness   Gastrointestinal: No abdominal or epigastric pain. No nausea, vomiting   Skin: No itching, burning, rashes or lesions       MEDICATIONS:  MEDICATIONS  (STANDING):  aMIOdarone    Tablet 200 milliGRAM(s) Oral daily  amLODIPine   Tablet 10 milliGRAM(s) Oral daily  atorvastatin 20 milliGRAM(s) Oral at bedtime  chlorhexidine 2% Cloths 1 Application(s) Topical <User Schedule>  cholestyramine Powder (Sugar-Free) 4 Gram(s) Oral two times a day  dextrose 5%. 1000 milliLiter(s) (50 mL/Hr) IV Continuous <Continuous>  dextrose 5%. 1000 milliLiter(s) (100 mL/Hr) IV Continuous <Continuous>  dextrose 50% Injectable 25 Gram(s) IV Push once  dextrose 50% Injectable 12.5 Gram(s) IV Push once  dextrose 50% Injectable 25 Gram(s) IV Push once  glucagon  Injectable 1 milliGRAM(s) IntraMuscular once  heparin   Injectable 5000 Unit(s) SubCutaneous every 8 hours  imipenem/cilastatin  IVPB 1000 milliGRAM(s) IV Intermittent every 8 hours  insulin lispro (ADMELOG) corrective regimen sliding scale   SubCutaneous every 6 hours  levothyroxine 50 MICROGram(s) Oral daily  lidocaine 2% Injectable 10 milliLiter(s) Local Injection once  lipid, fat emulsion (Fish Oil and Plant Based) 20% Infusion 0.5 Gm/kG/Day (20.83 mL/Hr) IV Continuous <Continuous>  losartan 25 milliGRAM(s) Oral daily  metoprolol tartrate 25 milliGRAM(s) Oral every 12 hours  octreotide  Injectable 100 MICROGram(s) IV Push every 8 hours  pantoprazole  Injectable 40 milliGRAM(s) IV Push every 12 hours  Parenteral Nutrition - Adult 1 Each (96 mL/Hr) TPN Continuous <Continuous>  Parenteral Nutrition - Adult 1 Each (96 mL/Hr) TPN Continuous <Continuous>  venlafaxine XR. 300 milliGRAM(s) Oral daily    MEDICATIONS  (PRN):  acetaminophen     Tablet .. 650 milliGRAM(s) Oral every 6 hours PRN Mild Pain (1 - 3)  benzocaine/menthol Lozenge 2 Lozenge Oral every 4 hours PRN Sore Throat  dextrose Oral Gel 15 Gram(s) Oral once PRN Blood Glucose LESS THAN 70 milliGRAM(s)/deciliter  hydrALAZINE Injectable 10 milliGRAM(s) IV Push every 4 hours PRN SBP > 140  melatonin 5 milliGRAM(s) Oral at bedtime PRN Sleep      Allergies    penicillin (Angioedema)    Intolerances        Vital Signs Last 24 Hrs  T(C): 36.4 (27 Jul 2023 09:28), Max: 37.2 (26 Jul 2023 21:55)  T(F): 97.6 (27 Jul 2023 09:28), Max: 98.9 (26 Jul 2023 21:55)  HR: 81 (27 Jul 2023 10:00) (76 - 82)  BP: 173/79 (27 Jul 2023 10:00) (133/63 - 173/79)  BP(mean): 114 (27 Jul 2023 10:00) (90 - 115)  RR: 24 (27 Jul 2023 10:00) (20 - 40)  SpO2: 97% (27 Jul 2023 10:00) (95% - 98%)    Parameters below as of 27 Jul 2023 10:00  Patient On (Oxygen Delivery Method): room air        07-26 @ 07:01 - 07-27 @ 07:00  --------------------------------------------------------  IN: 4664.4 mL / OUT: 3135 mL / NET: 1529.4 mL    07-27 @ 07:01 - 07-27 @ 12:56  --------------------------------------------------------  IN: 96 mL / OUT: 0 mL / NET: 96 mL        PHYSICAL EXAM:    General:  in no acute distress  HEENT: MMM, conjunctiva and sclera clear  Gastrointestinal: Soft  non-distended; Normal bowel sounds;ileostomy  Skin: Warm and dry. No obvious rash    LABS:      CBC Full  -  ( 27 Jul 2023 06:28 )  WBC Count : 13.53 K/uL  RBC Count : 2.85 M/uL  Hemoglobin : 8.5 g/dL  Hematocrit : 27.1 %  Platelet Count - Automated : 296 K/uL  Mean Cell Volume : 95.1 fl  Mean Cell Hemoglobin : 29.8 pg  Mean Cell Hemoglobin Concentration : 31.4 gm/dL  Auto Neutrophil # : x  Auto Lymphocyte # : x  Auto Monocyte # : x  Auto Eosinophil # : x  Auto Basophil # : x  Auto Neutrophil % : x  Auto Lymphocyte % : x  Auto Monocyte % : x  Auto Eosinophil % : x  Auto Basophil % : x    07-27    134<L>  |  103  |  21  ----------------------------<  121<H>  4.2   |  24  |  0.81    Ca    7.4<L>      27 Jul 2023 06:28  Phos  3.0     07-27  Mg     2.0     07-27    TPro  7.2  /  Alb  2.3<L>  /  TBili  4.3<H>  /  DBili  x   /  AST  188<H>  /  ALT  130<H>  /  AlkPhos  147<H>  07-27          Urinalysis Basic - ( 27 Jul 2023 06:28 )    Color: x / Appearance: x / SG: x / pH: x  Gluc: 121 mg/dL / Ketone: x  / Bili: x / Urobili: x   Blood: x / Protein: x / Nitrite: x   Leuk Esterase: x / RBC: x / WBC x   Sq Epi: x / Non Sq Epi: x / Bacteria: x                RADIOLOGY & ADDITIONAL STUDIES (The following images were personally reviewed):

## 2023-07-28 NOTE — PROGRESS NOTE ADULT - SUBJECTIVE AND OBJECTIVE BOX
INTERVAL HPI/OVERNIGHT EVENTS: hydral x 1, WBC 14 (13) possibly 2/2 vac change; minimal ostomy output, vac output dark brown (150cc)    STATUS POST:  6/27: Laparoscopic lysis of adhesions, converted to open lysis of adhesions, SBR x 3, temporary abdominal closure, 5L cryst, 1L 5% alb, 3 pRBC, 1 FFP, 1 plts  6/28: ex lap, removal of abthera, ileocolic resection, small bowel anastamosis, , 1.6 crystalloid, 250 5%, 175 uop   7/3: IR Gendel drained perihepatic aspiration of serous fluid.   7/5: RTOR exlap, washout, ileocolic resection with end ileostomy, blow hole colostomy, fistula, red rubber from ileostomy to small bowel anastomosis; vicryl bridging mesh; R JOYCE below ileostomy, L JOYCE at small bowel enterotomy repair; 500 LR, 500 5% albumin, 3u PRBC, 2 FFP, 400 UOP,     SUBJECTIVE: Patient seen and examined at bedside. In good spirits, resting comfortably, denies pain, n/v, cp, sob. Voiding without issues.      aMIOdarone    Tablet 200 milliGRAM(s) Oral daily  amLODIPine   Tablet 10 milliGRAM(s) Oral daily  heparin   Injectable 5000 Unit(s) SubCutaneous every 8 hours  hydrALAZINE Injectable 10 milliGRAM(s) IV Push every 4 hours PRN  imipenem/cilastatin  IVPB 1000 milliGRAM(s) IV Intermittent every 8 hours  losartan 25 milliGRAM(s) Oral daily  metoprolol tartrate 25 milliGRAM(s) Oral every 12 hours      Vital Signs Last 24 Hrs  T(C): 36.2 (28 Jul 2023 09:22), Max: 37.1 (27 Jul 2023 17:33)  T(F): 97.1 (28 Jul 2023 09:22), Max: 98.8 (27 Jul 2023 17:33)  HR: 79 (28 Jul 2023 06:00) (77 - 85)  BP: 97/57 (28 Jul 2023 08:00) (97/57 - 173/79)  BP(mean): 72 (28 Jul 2023 08:00) (72 - 114)  RR: 30 (28 Jul 2023 06:00) (16 - 30)  SpO2: 97% (28 Jul 2023 06:00) (93% - 99%)    Parameters below as of 28 Jul 2023 08:00  Patient On (Oxygen Delivery Method): room air      I&O's Detail    27 Jul 2023 07:01  -  28 Jul 2023 07:00  --------------------------------------------------------  IN:    Fat Emulsion (Fish Oil &amp; Plant Based) 20% Infusion: 312.6 mL    IV PiggyBack: 350 mL    Oral Fluid: 480 mL    TPN (Total Parenteral Nutrition): 2304 mL  Total IN: 3446.6 mL    OUT:    Bulb (mL): 100 mL    Drain (mL): 200 mL    Drain (mL): 200 mL    Fat Emulsion (Fish Oil &amp; Plant Based) 20% Infusion: 0 mL    Ileostomy (mL): 0 mL    VAC (Vacuum Assisted Closure) System (mL): 475 mL    Voided (mL): 2650 mL  Total OUT: 3625 mL    Total NET: -178.4 mL      28 Jul 2023 07:01  -  28 Jul 2023 09:35  --------------------------------------------------------  IN:    TPN (Total Parenteral Nutrition): 96 mL  Total IN: 96 mL    OUT:    Fat Emulsion (Fish Oil &amp; Plant Based) 20% Infusion: 0 mL  Total OUT: 0 mL    Total NET: 96 mL          General: NAD, resting comfortably in bed  C/V: NSR  Pulm: Nonlabored breathing, no respiratory distress  Abd: soft, minimally distended, nontender. Vac in place w/ good suction w/ bilious output in cannister, ostomy ppp w/ minimal output, isolated EC fistula w/ scant bilious output in pouch and red rubber catheter attached to wall suction, some bilious matter in wound bed. Mucous fistula bilious output in pouch.  Extrem: WWP, no edema, SCDs in place      LABS:                        8.6    14.24 )-----------( 318      ( 28 Jul 2023 04:45 )             28.0     07-28    136  |  105  |  21  ----------------------------<  117<H>  4.3   |  24  |  0.73    Ca    7.5<L>      28 Jul 2023 04:45  Phos  2.9     07-28  Mg     2.0     07-28    TPro  7.2  /  Alb  2.2<L>  /  TBili  4.0<H>  /  DBili  x   /  AST  189<H>  /  ALT  128<H>  /  AlkPhos  156<H>  07-28      Urinalysis Basic - ( 28 Jul 2023 04:45 )    Color: x / Appearance: x / SG: x / pH: x  Gluc: 117 mg/dL / Ketone: x  / Bili: x / Urobili: x   Blood: x / Protein: x / Nitrite: x   Leuk Esterase: x / RBC: x / WBC x   Sq Epi: x / Non Sq Epi: x / Bacteria: x        RADIOLOGY & ADDITIONAL STUDIES:

## 2023-07-28 NOTE — PROGRESS NOTE ADULT - SUBJECTIVE AND OBJECTIVE BOX
(covering for Dr. Peterson)  Patient in 5 ICU    Primaxin antibiotic  On amiodorone, norvasc, kozar, loperessor  Wound vac in place  On p/o clear liquids  On TPN  Ambulated with binder yesterday  Voiding spontaneously    Vital Signs Last 24 Hrs  T(C): 36.2 (28 Jul 2023 09:22), Max: 37.1 (27 Jul 2023 17:33)  T(F): 97.1 (28 Jul 2023 09:22), Max: 98.8 (27 Jul 2023 17:33)  HR: 82 (28 Jul 2023 11:00) (77 - 85)  BP: 127/61 (28 Jul 2023 11:00) (97/57 - 155/72)  BP(mean): 88 (28 Jul 2023 11:00) (72 - 107)  RR: 20 (28 Jul 2023 11:00) (16 - 30)  SpO2: 98% (28 Jul 2023 11:00) (93% - 99%)    Parameters below as of 28 Jul 2023 11:00  Patient On (Oxygen Delivery Method): room air    abd: wound vac in place, red rubber drain and wound vac tubing in place.  Some bilious fluid in wound manager pouch extending from left lower corner.  abdomen is scaphoid  ext: without calf tenderness, moving all extremeties     Outputs  1. UO  2,650 ml/24 hr until 7 AM  2. vac:  475 ml/24hr  3. bulb (from vac dressing): 100 ml/24 hr  4. other drain (from side of vac dressing): 200 ml/24 hrs   (intermittent wall vac)  5. mucus fistula: 200 ml/24 hrs                        8.6    14.24 )-----------( 318      ( 28 Jul 2023 04:45 )             28.0   07-28    136  |  105  |  21  ----------------------------<  117<H>  4.3   |  24  |  0.73    Ca    7.5<L>      28 Jul 2023 04:45  Phos  2.9     07-28  Mg     2.0     07-28    TPro  7.2  /  Alb  2.2<L>  /  TBili  4.0<H>  /  DBili  x   /  AST  189<H>  /  ALT  128<H>  /  AlkPhos  156<H>  07-28

## 2023-07-28 NOTE — PROGRESS NOTE ADULT - SUBJECTIVE AND OBJECTIVE BOX
Awake and alert  VS stable Afeb In RR Clear chest Trace edema  Labs stable  Able to transfer OOB to chair unassisted

## 2023-07-28 NOTE — PROGRESS NOTE ADULT - ATTENDING COMMENTS
Crohn's, AF, HTN s/p SBR c/b anastomotic leak with ileocolic resection, ileostomy, blowhole colostomy, EC fistulization  physical as above  vac in place  continue TPN  finish imipenem course tomorrow  increase in alkaline phosphatase could be from the imipenem; transaminase increase also noted; bilirubin stable at 4.2  continue to follow and may consider biliary drainage next week  rest as above  decision making of high complexity

## 2023-07-28 NOTE — PROGRESS NOTE ADULT - ASSESSMENT
General: Resting in bed, NAD, mild scleral icterus  HEENT: NC/AT, EOMI, PERRLA, normal hearing, no oral lesions, neck supple w/o LAD, no JVD  Pulmonary: Nonlabored breathing, no respiratory distress, CTA-B  Cardiovascular: NSR, no murmurs  Abdominal: soft, nondistended, nontender. JOYCE in L abdomen bilious to LIWS. Vac in place with brown output. Ostomy in R abdomen with bilious output and minimal gas   Extremities: WWP, 5/5 strength x 4, no clubbing/cyanosis/edema  Neuro: A/O x3, CNs II-XII grossly intact, normal motor/sensation, no focal deficits  Pulses: palpable distal pulses    Lines/tubes/drains: JOYCE x1, ostomy, wound vac 78M w/ Crohn's, AFib/Flutter s/p DCCVs on amiodarone, remote ileocectomy and open appy here for SBO vs Crohns flare, s/p NGT decompression and now s/p lap TRE converted to open TRE, SBR x 3, left in discontinuity with abthera vac on 6/27, RTOR for ileocolic resection, small bowel anastomosis, and abdominal wall closure on 6/28, and s/p IR aspiration of perihepatic fluid on 7/3, stepped down to telemetery on 7/4. wound dehisence on 7/5, RTOR exlap, washout, ileocolic resection with end ileostomy, blow hole colostomy, red rubber from ileostomy to small bowel anastomosis; vicryl bridging mesh on 7/5. Transferred to SICU post op for HD monitoring. Extubated 7/6.    Neuro: Pain well controlled. Hx anxiety: Continue home venlafaxine  CV: MAP > 65. Hx of Afib/flutter: s/p DCCV, on PO amiodarone, metoprolol. Hx of HTN- continue home losartan, new amlodipine 10 qd. IV Hydralazine PRN for SBP < 140 q4h. Hx of HLD: continue home rosuvastatin; Repeat TTE  (07/18) - PASP 64mmHg, EF 65-70%%. Previously total body overloaded - now euvolemic  Pulm: Extubated 7/6 - now on RA, saturating well. s/p Chest tube by CT surg (6/27--7/3)   GI: Continue CLD. TPN/lipids, wound dehisense -- RTOR on 7/5 for exlap, washout, ileocolic resection with end ileostomy, blow hole colostomy; vicryl bridging mesh explanted and vac replaced 2 x week, vac cannisters changed q6h- Continue Octreotide and cholestyramine. Monitoring LFTs daily  : Voids.   ID: Blood Cx sent 7/23 - NGTD. Empiric coverage with imipenem ((7/23-7/29) -- ID signed off Dapto (7/23--) IR Cx 7/3: C. tertium, Lactobacillis. OR Cx 7/5 - rare yeast - Imipenem (6/30-7/12), Fluconazole (6/30 -7/12) OR Cx 6/28: Vanc-resistant/amp-sens (but allergic) enterococcus s/p CTX (6/26-6/30), Flagyl (6/26-6/30), Dapto (6/30-7/5).  Endo: ISS; Hx of hypothyroid: cont synthroid. Gout: holding home allopurionol  PPx: SCD, HSQ.  Lines: PIV x 2, RUE PICC (6/30--), Victoria (7/8-7/10) // DC: R Chest tube for iatrogenic PTX (6/27--7/3), R TLC (06/26-30). R TLC (7/5 -7/12)  Wounds/Drains: midline incision; wound vac changes q48h with fistula opening; R JOYCE below ileostomy (D/C 7/17), L JOYCE at small bowel enterotomy repair - to LIWS.  PT/OT: CONRADO  Dispo: SICU

## 2023-07-28 NOTE — PROGRESS NOTE ADULT - SUBJECTIVE AND OBJECTIVE BOX
SUBJECTIVE: Patient seen this morning. Tolerating clears. Wound vac holding suction, bilious output.     MEDICATIONS  (STANDING):  aMIOdarone    Tablet 200 milliGRAM(s) Oral daily  amLODIPine   Tablet 10 milliGRAM(s) Oral daily  atorvastatin 20 milliGRAM(s) Oral at bedtime  chlorhexidine 2% Cloths 1 Application(s) Topical <User Schedule>  cholestyramine Powder (Sugar-Free) 4 Gram(s) Oral two times a day  dextrose 5%. 1000 milliLiter(s) (100 mL/Hr) IV Continuous <Continuous>  dextrose 5%. 1000 milliLiter(s) (50 mL/Hr) IV Continuous <Continuous>  dextrose 50% Injectable 25 Gram(s) IV Push once  dextrose 50% Injectable 12.5 Gram(s) IV Push once  dextrose 50% Injectable 25 Gram(s) IV Push once  glucagon  Injectable 1 milliGRAM(s) IntraMuscular once  heparin   Injectable 5000 Unit(s) SubCutaneous every 8 hours  imipenem/cilastatin  IVPB 1000 milliGRAM(s) IV Intermittent every 8 hours  insulin lispro (ADMELOG) corrective regimen sliding scale   SubCutaneous every 6 hours  levothyroxine 50 MICROGram(s) Oral daily  lipid, fat emulsion (Fish Oil and Plant Based) 20% Infusion 0.5 Gm/kG/Day (20.83 mL/Hr) IV Continuous <Continuous>  lipid, fat emulsion (Fish Oil and Plant Based) 20% Infusion 0.5 Gm/kG/Day (20.83 mL/Hr) IV Continuous <Continuous>  losartan 25 milliGRAM(s) Oral daily  metoprolol tartrate 25 milliGRAM(s) Oral every 12 hours  octreotide  Injectable 100 MICROGram(s) IV Push every 8 hours  pantoprazole  Injectable 40 milliGRAM(s) IV Push every 12 hours  Parenteral Nutrition - Adult 1 Each (96 mL/Hr) TPN Continuous <Continuous>  Parenteral Nutrition - Adult 1 Each (96 mL/Hr) TPN Continuous <Continuous>  venlafaxine XR. 300 milliGRAM(s) Oral daily    MEDICATIONS  (PRN):  benzocaine/menthol Lozenge 2 Lozenge Oral every 4 hours PRN Sore Throat  dextrose Oral Gel 15 Gram(s) Oral once PRN Blood Glucose LESS THAN 70 milliGRAM(s)/deciliter  hydrALAZINE Injectable 10 milliGRAM(s) IV Push every 4 hours PRN SBP > 140  melatonin 5 milliGRAM(s) Oral at bedtime PRN Sleep      Drips:     ICU Vital Signs Last 24 Hrs  T(C): 36.2 (28 Jul 2023 09:22), Max: 37.1 (27 Jul 2023 17:33)  T(F): 97.1 (28 Jul 2023 09:22), Max: 98.8 (27 Jul 2023 17:33)  HR: 84 (28 Jul 2023 09:40) (77 - 85)  BP: 115/56 (28 Jul 2023 09:40) (97/57 - 155/72)  BP(mean): 80 (28 Jul 2023 09:40) (72 - 107)  ABP: --  ABP(mean): --  RR: 20 (28 Jul 2023 09:40) (16 - 30)  SpO2: 96% (28 Jul 2023 09:40) (93% - 99%)    O2 Parameters below as of 28 Jul 2023 09:40  Patient On (Oxygen Delivery Method): room air          Physical Exam:  General: Resting in bed, NAD, mild scleral icterus  HEENT: NC/AT, EOMI, PERRLA, normal hearing, no oral lesions, neck supple w/o LAD, no JVD  Pulmonary: Nonlabored breathing, no respiratory distress, CTA-B  Cardiovascular: NSR, no murmurs  Abdominal: soft, nondistended, nontender. JOYCE in L abdomen bilious to LIWS. isolated mucus plug producing gas and minimal output. Vac in place with brown output, replaced on 7/27. Ostomy in R abdomen with bilious output and minimal gas.    Extremities: WWP, 5/5 strength x 4, no clubbing/cyanosis/edema  Neuro: A/O x3, CNs II-XII grossly intact, normal motor/sensation, no focal deficits  Pulses: palpable distal pulses      I&O's Summary    27 Jul 2023 07:01  -  28 Jul 2023 07:00  --------------------------------------------------------  IN: 3446.6 mL / OUT: 3625 mL / NET: -178.4 mL    28 Jul 2023 07:01  -  28 Jul 2023 10:43  --------------------------------------------------------  IN: 408 mL / OUT: 0 mL / NET: 408 mL        LABS:                        8.6    14.24 )-----------( 318      ( 28 Jul 2023 04:45 )             28.0     07-28    136  |  105  |  21  ----------------------------<  117<H>  4.3   |  24  |  0.73    Ca    7.5<L>      28 Jul 2023 04:45  Phos  2.9     07-28  Mg     2.0     07-28    TPro  7.2  /  Alb  2.2<L>  /  TBili  4.0<H>  /  DBili  x   /  AST  189<H>  /  ALT  128<H>  /  AlkPhos  156<H>  07-28      Urinalysis Basic - ( 28 Jul 2023 04:45 )    Color: x / Appearance: x / SG: x / pH: x  Gluc: 117 mg/dL / Ketone: x  / Bili: x / Urobili: x   Blood: x / Protein: x / Nitrite: x   Leuk Esterase: x / RBC: x / WBC x   Sq Epi: x / Non Sq Epi: x / Bacteria: x      CAPILLARY BLOOD GLUCOSE      POCT Blood Glucose.: 114 mg/dL (27 Jul 2023 22:40)  POCT Blood Glucose.: 123 mg/dL (27 Jul 2023 18:42)  POCT Blood Glucose.: 132 mg/dL (27 Jul 2023 12:58)    LIVER FUNCTIONS - ( 28 Jul 2023 04:45 )  Alb: 2.2 g/dL / Pro: 7.2 g/dL / ALK PHOS: 156 U/L / ALT: 128 U/L / AST: 189 U/L / GGT: x

## 2023-07-28 NOTE — PROGRESS NOTE ADULT - ASSESSMENT
78M POD30 laparoscopic lysis of adhesions, converted to open lysis of adhesions, SBR x 3, temporary abdominal closure, POD29 ex lap, removal of abthera, ileocolic resection, small bowel anastomosis c/b anastomotic leak resulting in wound dehiscence now POD22 RTOR exlap, washout, ileocolic resection with end ileostomy, blow hole colostomy, fistula, red rubber from ileostomy to small bowel anastomosis; vicryl bridging mesh; R JOYCE below ileostomy, L JOYCE at small bowel enterotomy repair.    Please change vac cannister q6h  Encourage ambulation, OOBTC  Closely monitor vac output  Appreciate excellent SICU care 78M POD30 laparoscopic lysis of adhesions, converted to open lysis of adhesions, SBR x 3, temporary abdominal closure, POD29 ex lap, removal of abthera, ileocolic resection, small bowel anastomosis c/b anastomotic leak resulting in wound dehiscence now POD22 RTOR exlap, washout, ileocolic resection with end ileostomy, blow hole colostomy, fistula, red rubber from ileostomy to small bowel anastomosis; vicryl bridging mesh; R JOYCE below ileostomy, L JOYCE at small bowel enterotomy repair.    Please change vac cannister q6h  Closely monitor output and consider repleting as necessary given high volume loss through vac and EC fistula  Encourage ambulation, OOBTC  Appreciate excellent SICU care

## 2023-07-28 NOTE — PROGRESS NOTE ADULT - SUBJECTIVE AND OBJECTIVE BOX
ON: hydral x 1, WBC 14 (13) possibly 2/2 vac change; minimal ostomy output, vac output dark brown (150cc)            SUBJECTIVE: Pt seen and examined sitting upright in his chair this am by ICU team. Patient is sitting comfortably in chair with no complaints. Tolerating clear liquid diet, pain well controlled with current regimen. Patient denies fever, nausea, vomiting, chest pain, and shortness of breath.       MEDICATIONS  (STANDING):  aMIOdarone    Tablet 200 milliGRAM(s) Oral daily  amLODIPine   Tablet 10 milliGRAM(s) Oral daily  atorvastatin 20 milliGRAM(s) Oral at bedtime  chlorhexidine 2% Cloths 1 Application(s) Topical <User Schedule>  cholestyramine Powder (Sugar-Free) 4 Gram(s) Oral two times a day  dextrose 5%. 1000 milliLiter(s) (50 mL/Hr) IV Continuous <Continuous>  dextrose 5%. 1000 milliLiter(s) (100 mL/Hr) IV Continuous <Continuous>  dextrose 50% Injectable 25 Gram(s) IV Push once  dextrose 50% Injectable 12.5 Gram(s) IV Push once  dextrose 50% Injectable 25 Gram(s) IV Push once  glucagon  Injectable 1 milliGRAM(s) IntraMuscular once  heparin   Injectable 5000 Unit(s) SubCutaneous every 8 hours  imipenem/cilastatin  IVPB 1000 milliGRAM(s) IV Intermittent every 8 hours  insulin lispro (ADMELOG) corrective regimen sliding scale   SubCutaneous every 6 hours  levothyroxine 50 MICROGram(s) Oral daily  lipid, fat emulsion (Fish Oil and Plant Based) 20% Infusion 0.5 Gm/kG/Day (20.83 mL/Hr) IV Continuous <Continuous>  lipid, fat emulsion (Fish Oil and Plant Based) 20% Infusion 0.5 Gm/kG/Day (20.83 mL/Hr) IV Continuous <Continuous>  losartan 25 milliGRAM(s) Oral daily  metoprolol tartrate 25 milliGRAM(s) Oral every 12 hours  octreotide  Injectable 100 MICROGram(s) IV Push every 8 hours  pantoprazole  Injectable 40 milliGRAM(s) IV Push every 12 hours  Parenteral Nutrition - Adult 1 Each (96 mL/Hr) TPN Continuous <Continuous>  Parenteral Nutrition - Adult 1 Each (96 mL/Hr) TPN Continuous <Continuous>  venlafaxine XR. 300 milliGRAM(s) Oral daily    MEDICATIONS  (PRN):  acetaminophen     Tablet .. 650 milliGRAM(s) Oral every 6 hours PRN Mild Pain (1 - 3)  benzocaine/menthol Lozenge 2 Lozenge Oral every 4 hours PRN Sore Throat  dextrose Oral Gel 15 Gram(s) Oral once PRN Blood Glucose LESS THAN 70 milliGRAM(s)/deciliter  hydrALAZINE Injectable 10 milliGRAM(s) IV Push every 4 hours PRN SBP > 140  melatonin 5 milliGRAM(s) Oral at bedtime PRN Sleep      Drips:     ICU Vital Signs Last 24 Hrs  T(C): 36.4 (28 Jul 2023 06:27), Max: 37.1 (27 Jul 2023 17:33)  T(F): 97.5 (28 Jul 2023 06:27), Max: 98.8 (27 Jul 2023 17:33)  HR: 79 (28 Jul 2023 06:00) (77 - 85)  BP: 97/57 (28 Jul 2023 08:00) (97/57 - 173/79)  BP(mean): 72 (28 Jul 2023 08:00) (72 - 114)  ABP: --  ABP(mean): --  RR: 30 (28 Jul 2023 06:00) (16 - 30)  SpO2: 97% (28 Jul 2023 06:00) (93% - 99%)    O2 Parameters below as of 28 Jul 2023 08:00  Patient On (Oxygen Delivery Method): room air            Physical Exam:  General: Resting in bed, NAD, mild scleral icterus  HEENT: NC/AT, EOMI, PERRLA, normal hearing, no oral lesions, neck supple w/o LAD, no JVD  Pulmonary: Nonlabored breathing, no respiratory distress, CTA-B  Cardiovascular: NSR, no murmurs  Abdominal: soft, nondistended, nontender. JOYCE in L abdomen bilious to LIWS. isolated mucus plug producing gas and minimal output. Vac in place with brown output, replaced on 7/27. Ostomy in R abdomen with bilious output and minimal gas.    Extremities: WWP, 5/5 strength x 4, no clubbing/cyanosis/edema  Neuro: A/O x3, CNs II-XII grossly intact, normal motor/sensation, no focal deficits  Pulses: palpable distal pulses    Lines/tubes/drains: JOYCE x1, ostomy, wound vac  Poole:	      Vent settings:      I&O's Summary    27 Jul 2023 07:01  -  28 Jul 2023 07:00  --------------------------------------------------------  IN: 3206.6 mL / OUT: 3625 mL / NET: -418.4 mL    28 Jul 2023 07:01  -  28 Jul 2023 08:17  --------------------------------------------------------  IN: 96 mL / OUT: 0 mL / NET: 96 mL        LABS:                        8.6    14.24 )-----------( 318      ( 28 Jul 2023 04:45 )             28.0     07-28    136  |  105  |  21  ----------------------------<  117<H>  4.3   |  24  |  0.73    Ca    7.5<L>      28 Jul 2023 04:45  Phos  2.9     07-28  Mg     2.0     07-28    TPro  7.2  /  Alb  2.2<L>  /  TBili  4.0<H>  /  DBili  x   /  AST  189<H>  /  ALT  128<H>  /  AlkPhos  156<H>  07-28      Urinalysis Basic - ( 28 Jul 2023 04:45 )    Color: x / Appearance: x / SG: x / pH: x  Gluc: 117 mg/dL / Ketone: x  / Bili: x / Urobili: x   Blood: x / Protein: x / Nitrite: x   Leuk Esterase: x / RBC: x / WBC x   Sq Epi: x / Non Sq Epi: x / Bacteria: x      CAPILLARY BLOOD GLUCOSE      POCT Blood Glucose.: 114 mg/dL (27 Jul 2023 22:40)  POCT Blood Glucose.: 123 mg/dL (27 Jul 2023 18:42)  POCT Blood Glucose.: 132 mg/dL (27 Jul 2023 12:58)    LIVER FUNCTIONS - ( 28 Jul 2023 04:45 )  Alb: 2.2 g/dL / Pro: 7.2 g/dL / ALK PHOS: 156 U/L / ALT: 128 U/L / AST: 189 U/L / GGT: x             Cultures:    RADIOLOGY & ADDITIONAL STUDIES:     ON: hydral x 1, WBC 14 (13) possibly 2/2 vac change; minimal ostomy output, vac output dark brown (150cc)    7/78: : d/c tylenol. uric acid added on, low at 1.6     SUBJECTIVE: Pt seen and examined sitting upright in his chair this am by ICU team. Patient is sitting comfortably in chair with no complaints. Tolerating clear liquid diet, pain well controlled with current regimen. Patient denies fever, nausea, vomiting, chest pain, and shortness of breath.       MEDICATIONS  (STANDING):  aMIOdarone    Tablet 200 milliGRAM(s) Oral daily  amLODIPine   Tablet 10 milliGRAM(s) Oral daily  atorvastatin 20 milliGRAM(s) Oral at bedtime  chlorhexidine 2% Cloths 1 Application(s) Topical <User Schedule>  cholestyramine Powder (Sugar-Free) 4 Gram(s) Oral two times a day  dextrose 5%. 1000 milliLiter(s) (50 mL/Hr) IV Continuous <Continuous>  dextrose 5%. 1000 milliLiter(s) (100 mL/Hr) IV Continuous <Continuous>  dextrose 50% Injectable 25 Gram(s) IV Push once  dextrose 50% Injectable 12.5 Gram(s) IV Push once  dextrose 50% Injectable 25 Gram(s) IV Push once  glucagon  Injectable 1 milliGRAM(s) IntraMuscular once  heparin   Injectable 5000 Unit(s) SubCutaneous every 8 hours  imipenem/cilastatin  IVPB 1000 milliGRAM(s) IV Intermittent every 8 hours  insulin lispro (ADMELOG) corrective regimen sliding scale   SubCutaneous every 6 hours  levothyroxine 50 MICROGram(s) Oral daily  lipid, fat emulsion (Fish Oil and Plant Based) 20% Infusion 0.5 Gm/kG/Day (20.83 mL/Hr) IV Continuous <Continuous>  lipid, fat emulsion (Fish Oil and Plant Based) 20% Infusion 0.5 Gm/kG/Day (20.83 mL/Hr) IV Continuous <Continuous>  losartan 25 milliGRAM(s) Oral daily  metoprolol tartrate 25 milliGRAM(s) Oral every 12 hours  octreotide  Injectable 100 MICROGram(s) IV Push every 8 hours  pantoprazole  Injectable 40 milliGRAM(s) IV Push every 12 hours  Parenteral Nutrition - Adult 1 Each (96 mL/Hr) TPN Continuous <Continuous>  Parenteral Nutrition - Adult 1 Each (96 mL/Hr) TPN Continuous <Continuous>  venlafaxine XR. 300 milliGRAM(s) Oral daily    MEDICATIONS  (PRN):  acetaminophen     Tablet .. 650 milliGRAM(s) Oral every 6 hours PRN Mild Pain (1 - 3)  benzocaine/menthol Lozenge 2 Lozenge Oral every 4 hours PRN Sore Throat  dextrose Oral Gel 15 Gram(s) Oral once PRN Blood Glucose LESS THAN 70 milliGRAM(s)/deciliter  hydrALAZINE Injectable 10 milliGRAM(s) IV Push every 4 hours PRN SBP > 140  melatonin 5 milliGRAM(s) Oral at bedtime PRN Sleep      Drips:     ICU Vital Signs Last 24 Hrs  T(C): 36.4 (28 Jul 2023 06:27), Max: 37.1 (27 Jul 2023 17:33)  T(F): 97.5 (28 Jul 2023 06:27), Max: 98.8 (27 Jul 2023 17:33)  HR: 79 (28 Jul 2023 06:00) (77 - 85)  BP: 97/57 (28 Jul 2023 08:00) (97/57 - 173/79)  BP(mean): 72 (28 Jul 2023 08:00) (72 - 114)  ABP: --  ABP(mean): --  RR: 30 (28 Jul 2023 06:00) (16 - 30)  SpO2: 97% (28 Jul 2023 06:00) (93% - 99%)    O2 Parameters below as of 28 Jul 2023 08:00  Patient On (Oxygen Delivery Method): room air            Physical Exam:  General: Resting in bed, NAD, mild scleral icterus and jaundice appearance   HEENT: NC/AT, EOMI, PERRLA, normal hearing, no oral lesions, neck supple w/o LAD, no JVD  Pulmonary: Nonlabored breathing on room air, no respiratory distress, CTA-B  Cardiovascular: NSR, no murmurs  Abdominal: soft, nondistended, nontender. JOYCE in L abdomen bilious to LIWS  from wound vac with ostomy paste. Wound Vac in place with brown output. Ostomy in R abdomen with bilious output and minimal gas   Extremities: WWP, 5/5 strength x 4, no clubbing/cyanosis/edema  Neuro: A/O x3, CNs II-XII grossly intact, normal motor/sensation, no focal deficits  Pulses: palpable distal pulses    Lines/tubes/drains: JOYCE x1, ostomy, wound vac  Poole:	      Vent settings:      I&O's Summary    27 Jul 2023 07:01  -  28 Jul 2023 07:00  --------------------------------------------------------  IN: 3206.6 mL / OUT: 3625 mL / NET: -418.4 mL    28 Jul 2023 07:01  -  28 Jul 2023 08:17  --------------------------------------------------------  IN: 96 mL / OUT: 0 mL / NET: 96 mL        LABS:                        8.6    14.24 )-----------( 318      ( 28 Jul 2023 04:45 )             28.0     07-28    136  |  105  |  21  ----------------------------<  117<H>  4.3   |  24  |  0.73    Ca    7.5<L>      28 Jul 2023 04:45  Phos  2.9     07-28  Mg     2.0     07-28    TPro  7.2  /  Alb  2.2<L>  /  TBili  4.0<H>  /  DBili  x   /  AST  189<H>  /  ALT  128<H>  /  AlkPhos  156<H>  07-28      Urinalysis Basic - ( 28 Jul 2023 04:45 )    Color: x / Appearance: x / SG: x / pH: x  Gluc: 117 mg/dL / Ketone: x  / Bili: x / Urobili: x   Blood: x / Protein: x / Nitrite: x   Leuk Esterase: x / RBC: x / WBC x   Sq Epi: x / Non Sq Epi: x / Bacteria: x      CAPILLARY BLOOD GLUCOSE      POCT Blood Glucose.: 114 mg/dL (27 Jul 2023 22:40)  POCT Blood Glucose.: 123 mg/dL (27 Jul 2023 18:42)  POCT Blood Glucose.: 132 mg/dL (27 Jul 2023 12:58)    LIVER FUNCTIONS - ( 28 Jul 2023 04:45 )  Alb: 2.2 g/dL / Pro: 7.2 g/dL / ALK PHOS: 156 U/L / ALT: 128 U/L / AST: 189 U/L / GGT: x             Cultures:    RADIOLOGY & ADDITIONAL STUDIES:

## 2023-07-28 NOTE — PROGRESS NOTE ADULT - ASSESSMENT
A/P  Stable overall.  Afeb, VSS on current regimen.  WBC been 13-14K  On primaxin.  Last vac change was yesterday.  Some leakage dealt with via tegaderms  UO excellent.  Clearly hydrated  Nutrition wise: On TPN + po clears    Continue care as before.  Patient is comfortable.  No plans for CT presently.  Follow exam and outputs.

## 2023-07-28 NOTE — CHART NOTE - NSCHARTNOTEFT_GEN_A_CORE
Admitting Diagnosis:   Patient is a 78y old  Male who presents with a chief complaint of SBO (28 Jul 2023 12:28)      PAST MEDICAL & SURGICAL HISTORY:  Essential hypertension  Hypertension      Atrial fibrillation  s/p cardioversion 2010 and 2014  Pt. reports 4 DCCV  Now on Amiodarone 200mg PO bid and Eliquis 5mg PO bid  Last DCCV 4 yrs ago at [facility]      Crohn's disease  s/p partial resection of ileum      Hyperlipidemia      Hypothyroidism      History of depression  On Venlafaxine ER 150mg PO bid      H/O knee surgery      History of cataract surgery          Current Nutrition Order:   TPN via PICC- 427g Dex + 146g AA + 50g SMOF lipids; provides 2535.8kcals, 146g protein, GIR 2.93 mg/kg/min. 2.3L @96ml/hr  PO- Clear liquid diet    PO Intake: Good (%) [   ]  Fair (50-75%) [ X  ] Poor (<25%) [   ]    GI Issues: wound vac in place, ostomy    Pain: denies pain/discomfort    Skin Integrity:  stage II PU to L buttock [healed], stage I PU to sacrum, midline wound vac, ileostomy, L JOYCE drain to suction, R JOYCE drain with scant serous output    Labs:   07-28    136  |  105  |  21  ----------------------------<  117<H>  4.3   |  24  |  0.73    Ca    7.5<L>      28 Jul 2023 04:45  Phos  2.9     07-28  Mg     2.0     07-28    TPro  7.2  /  Alb  2.2<L>  /  TBili  4.0<H>  /  DBili  x   /  AST  189<H>  /  ALT  128<H>  /  AlkPhos  156<H>  07-28    CAPILLARY BLOOD GLUCOSE      POCT Blood Glucose.: 126 mg/dL (28 Jul 2023 12:59)  POCT Blood Glucose.: 114 mg/dL (27 Jul 2023 22:40)  POCT Blood Glucose.: 123 mg/dL (27 Jul 2023 18:42)      Medications:  MEDICATIONS  (STANDING):  aMIOdarone    Tablet 200 milliGRAM(s) Oral daily  amLODIPine   Tablet 10 milliGRAM(s) Oral daily  atorvastatin 20 milliGRAM(s) Oral at bedtime  chlorhexidine 2% Cloths 1 Application(s) Topical <User Schedule>  cholestyramine Powder (Sugar-Free) 4 Gram(s) Oral two times a day  dextrose 5%. 1000 milliLiter(s) (50 mL/Hr) IV Continuous <Continuous>  dextrose 5%. 1000 milliLiter(s) (100 mL/Hr) IV Continuous <Continuous>  dextrose 50% Injectable 25 Gram(s) IV Push once  dextrose 50% Injectable 12.5 Gram(s) IV Push once  dextrose 50% Injectable 25 Gram(s) IV Push once  glucagon  Injectable 1 milliGRAM(s) IntraMuscular once  heparin   Injectable 5000 Unit(s) SubCutaneous every 8 hours  imipenem/cilastatin  IVPB 1000 milliGRAM(s) IV Intermittent every 8 hours  insulin lispro (ADMELOG) corrective regimen sliding scale   SubCutaneous every 6 hours  levothyroxine 50 MICROGram(s) Oral daily  losartan 25 milliGRAM(s) Oral daily  metoprolol tartrate 25 milliGRAM(s) Oral every 12 hours  octreotide  Injectable 100 MICROGram(s) IV Push every 8 hours  pantoprazole  Injectable 40 milliGRAM(s) IV Push every 12 hours  Parenteral Nutrition - Adult 1 Each (96 mL/Hr) TPN Continuous <Continuous>  Parenteral Nutrition - Adult 1 Each (96 mL/Hr) TPN Continuous <Continuous>  venlafaxine XR. 300 milliGRAM(s) Oral daily    MEDICATIONS  (PRN):  benzocaine/menthol Lozenge 2 Lozenge Oral every 4 hours PRN Sore Throat  dextrose Oral Gel 15 Gram(s) Oral once PRN Blood Glucose LESS THAN 70 milliGRAM(s)/deciliter  hydrALAZINE Injectable 10 milliGRAM(s) IV Push every 4 hours PRN SBP > 140  melatonin 5 milliGRAM(s) Oral at bedtime PRN Sleep      Weight: 101kg [5 July 2023]  Daily   99.9kg [9 July 2023]  Daily 102.1kg [10 July 2023]    Weight Change: weight gain ? r/t fluid shifts, edema. previously noted with trace generalized edema    IBW: 86.4kg   % IBW: 118.2% IBW    Estimated energy needs:   Ideal body weight (86.4kg) used for calculations as pt >100% of IBW, BMI <30 per [facility] Standards of Care. Needs estimated for age and adjusted for current clinical status, increased needs for post-op & open abd wound healing    Calories: 5516-0004 kcals based on 30-35 kcals/kg  Protein: 129.6-172.8 g protein based on 1.5-2g protein/kg  *Fluid needs per team    Subjective:   78M w/ Crohn's, AFib/Flutter s/p DCCVs on amiodarone, remote ileocectomy and open appy here for SBO vs Crohn’s flare, s/p NGT decompression and now s/p lap TRE converted to open TRE, SBR x 3, left in discontinuity with abthera vac on 6/27, RTOR for ileocolic resection, small bowel anastomosis, and abdominal wall closure on 6/28, and s/p IR aspiration of perihepatic fluid on 7/3, stepped down to telemetery on 7/4. wound dehiscence on 7/5, RTOR 7/5 for exlap, washout. Extubated 7/6. Continues on TPN and started on CLD on 7/12. Started on cholestyramine and octreotide. Seen by SLP on 7/14 and cleared for soft & bite sized diet as appropriate.     Chart reviewed. Pt seen this AM with IDT during AM rounds. Afebrile HR & BP WNL. MAPs >65 mmHg. TPN remains primary means to nutrition w/ clear liquid diet & tolerating. Current TPN provision provides 29.3 kcals/kg IBW & 1.69g protein/kg IBW. I&Os x 24hrs: 3446.6 / 3525 [2650ml uop] = =178.4ml net. 2304ml + 312ml TPN Intake x 24hrs. Output: 100ml L JOYCE drain + 475ml wound vac output + 200ml EC fistula + 200ml mucus fistula output. Wound vac with bilious output. Ostomy in R abdomen with bilious output and minimal gas. Pt requesting ice cream- okay per MD. To update in CBORD. Labs notable for elevated WBC, LFTs trending up. . Plan to obtain 24hrs urinary nitrogen for nitrogen balance study to assess adequacy of nutrition provision/degree of catabolism. RDN will continue to monitor, reassess, and intervene as appropriate.     Previous Nutrition Diagnosis: Increased Nutrient Needs R/T physiological demands for nutrient AEB post-op wound healing    Active [  X ]  Resolved [   ]    If resolved, new PES:     Goal:   Pt will meet at least 75% of protein & energy needs via most appropriate route for nutrition   Pt will meet at least 75% of protein & energy needs via most appropriate route for nutrition     Recommendations:  1. Continue TPN as primary means to nutrition  - recommend 427g dextrose + 146g AA + 50g SMOF lipids  - to provide 2535.8kcals, 29.3 kcals/kg IBW, 1.68g protein/kg IBW, GIR 2.90  - fluids & lytes per team  2. PO- clear liquid diet [ice cream ok]  - consider advancing diet to full liquids fi medically feasible to allow for more menu options/improve PO intake  - closely monitor tolerance & s/s GI distress  - monitor for potential diet advancement  - can initiate TPN wean when PO meeting >60% estimated needs  3. Vitamins/minerals per team  4. Weekly lipid panel  - hold if TPN lipids if TG >400  5. Daily BMP/Mg/Phos, POC BG Q4-6hrs  6. Pain regimen per team  7. Monitor clinical course & adjust recommendations prn    Education: none    Risk Level: High [  X ] Moderate [   ] Low [   ] Admitting Diagnosis:   Patient is a 78y old  Male who presents with a chief complaint of SBO (28 Jul 2023 12:28)      PAST MEDICAL & SURGICAL HISTORY:  Essential hypertension  Hypertension      Atrial fibrillation  s/p cardioversion 2010 and 2014  Pt. reports 4 DCCV  Now on Amiodarone 200mg PO bid and Eliquis 5mg PO bid  Last DCCV 4 yrs ago at [facility]      Crohn's disease  s/p partial resection of ileum      Hyperlipidemia      Hypothyroidism      History of depression  On Venlafaxine ER 150mg PO bid      H/O knee surgery      History of cataract surgery          Current Nutrition Order:   TPN via PICC- 427g Dex + 146g AA + 50g SMOF lipids; provides 2535.8kcals, 146g protein, GIR 2.93 mg/kg/min. 2.3L @96ml/hr  PO- Clear liquid diet    PO Intake: Good (%) [   ]  Fair (50-75%) [ X  ] Poor (<25%) [   ]    GI Issues: wound vac in place, ostomy    Pain: denies pain/discomfort    Skin Integrity:  stage II PU to L buttock [healed], stage I PU to sacrum, midline wound vac, ileostomy, L JOYCE drain to suction, R JOYCE drain with scant serous output    Labs:   07-28    136  |  105  |  21  ----------------------------<  117<H>  4.3   |  24  |  0.73    Ca    7.5<L>      28 Jul 2023 04:45  Phos  2.9     07-28  Mg     2.0     07-28    TPro  7.2  /  Alb  2.2<L>  /  TBili  4.0<H>  /  DBili  x   /  AST  189<H>  /  ALT  128<H>  /  AlkPhos  156<H>  07-28    CAPILLARY BLOOD GLUCOSE      POCT Blood Glucose.: 126 mg/dL (28 Jul 2023 12:59)  POCT Blood Glucose.: 114 mg/dL (27 Jul 2023 22:40)  POCT Blood Glucose.: 123 mg/dL (27 Jul 2023 18:42)      Medications:  MEDICATIONS  (STANDING):  aMIOdarone    Tablet 200 milliGRAM(s) Oral daily  amLODIPine   Tablet 10 milliGRAM(s) Oral daily  atorvastatin 20 milliGRAM(s) Oral at bedtime  chlorhexidine 2% Cloths 1 Application(s) Topical <User Schedule>  cholestyramine Powder (Sugar-Free) 4 Gram(s) Oral two times a day  dextrose 5%. 1000 milliLiter(s) (50 mL/Hr) IV Continuous <Continuous>  dextrose 5%. 1000 milliLiter(s) (100 mL/Hr) IV Continuous <Continuous>  dextrose 50% Injectable 25 Gram(s) IV Push once  dextrose 50% Injectable 12.5 Gram(s) IV Push once  dextrose 50% Injectable 25 Gram(s) IV Push once  glucagon  Injectable 1 milliGRAM(s) IntraMuscular once  heparin   Injectable 5000 Unit(s) SubCutaneous every 8 hours  imipenem/cilastatin  IVPB 1000 milliGRAM(s) IV Intermittent every 8 hours  insulin lispro (ADMELOG) corrective regimen sliding scale   SubCutaneous every 6 hours  levothyroxine 50 MICROGram(s) Oral daily  losartan 25 milliGRAM(s) Oral daily  metoprolol tartrate 25 milliGRAM(s) Oral every 12 hours  octreotide  Injectable 100 MICROGram(s) IV Push every 8 hours  pantoprazole  Injectable 40 milliGRAM(s) IV Push every 12 hours  Parenteral Nutrition - Adult 1 Each (96 mL/Hr) TPN Continuous <Continuous>  Parenteral Nutrition - Adult 1 Each (96 mL/Hr) TPN Continuous <Continuous>  venlafaxine XR. 300 milliGRAM(s) Oral daily    MEDICATIONS  (PRN):  benzocaine/menthol Lozenge 2 Lozenge Oral every 4 hours PRN Sore Throat  dextrose Oral Gel 15 Gram(s) Oral once PRN Blood Glucose LESS THAN 70 milliGRAM(s)/deciliter  hydrALAZINE Injectable 10 milliGRAM(s) IV Push every 4 hours PRN SBP > 140  melatonin 5 milliGRAM(s) Oral at bedtime PRN Sleep      Weight: 101kg [5 July 2023]  Daily   99.9kg [9 July 2023]  Daily 102.1kg [10 July 2023]    Weight Change: weight gain ? r/t fluid shifts, edema. previously noted with trace generalized edema    IBW: 86.4kg   % IBW: 118.2% IBW    Estimated energy needs:   Ideal body weight (86.4kg) used for calculations as pt >100% of IBW, BMI <30 per [facility] Standards of Care. Needs estimated for age and adjusted for current clinical status, increased needs for post-op & open abd wound healing    Calories: 2640-7464 kcals based on 30-35 kcals/kg  Protein: 129.6-172.8 g protein based on 1.5-2g protein/kg  *Fluid needs per team    Subjective:   78M w/ Crohn's, AFib/Flutter s/p DCCVs on amiodarone, remote ileocectomy and open appy here for SBO vs Crohn’s flare, s/p NGT decompression and now s/p lap TRE converted to open TRE, SBR x 3, left in discontinuity with abthera vac on 6/27, RTOR for ileocolic resection, small bowel anastomosis, and abdominal wall closure on 6/28, and s/p IR aspiration of perihepatic fluid on 7/3, stepped down to telemetery on 7/4. wound dehiscence on 7/5, RTOR 7/5 for exlap, washout. Extubated 7/6. Continues on TPN and started on CLD on 7/12. Started on cholestyramine and octreotide. Seen by SLP on 7/14 and cleared for soft & bite sized diet as appropriate.     Chart reviewed. Pt seen this AM with IDT during AM rounds. Afebrile HR & BP WNL. MAPs >65 mmHg. TPN remains primary means to nutrition w/ clear liquid diet & tolerating. Current TPN provision provides 29.3 kcals/kg IBW & 1.69g protein/kg IBW. I&Os x 24hrs: 3446.6 / 3525 [2650ml uop] = =178.4ml net. 2304ml + 312ml TPN Intake x 24hrs. Output: 100ml L JOYCE drain + 475ml wound vac output + 200ml EC fistula + 200ml mucus fistula output. Wound vac with bilious output. Ostomy in R abdomen with bilious output and minimal gas. Pt requesting ice cream- okay per MD. To update in CBORD. Labs notable for elevated WBC, LFTs trending up. . Plan to obtain 24hrs urinary nitrogen for nitrogen balance study to assess adequacy of nutrition provision/degree of catabolism. RDN will continue to monitor, reassess, and intervene as appropriate.     Previous Nutrition Diagnosis: Increased Nutrient Needs R/T physiological demands for nutrient AEB post-op wound healing    Active [  X ]  Resolved [   ]    If resolved, new PES:     Goal:   Pt will meet at least 75% of protein & energy needs via most appropriate route for nutrition   Pt will meet at least 75% of protein & energy needs via most appropriate route for nutrition     Recommendations:  1. Continue TPN as primary means to nutrition  - recommend 427g dextrose + 146g AA + 50g SMOF lipids  - to provide 2535.8kcals, 29.3 kcals/kg IBW, 1.68g protein/kg IBW, GIR 2.90  - fluids & lytes per team  - trend LFTs and adjust provision prn to prevent overfeeding  2. PO- clear liquid diet [ice cream ok]  - consider advancing diet to full liquids fi medically feasible to allow for more menu options/improve PO intake  - closely monitor tolerance & s/s GI distress  - monitor for potential diet advancement  - can initiate TPN wean when PO meeting >60% estimated needs  3. Vitamins/minerals per team  4. Weekly lipid panel  - hold if TPN lipids if TG >400  5. Daily BMP/Mg/Phos, POC BG Q4-6hrs  6. Pain regimen per team  7. Monitor clinical course & adjust recommendations prn    Education: none    Risk Level: High [  X ] Moderate [   ] Low [   ]

## 2023-07-28 NOTE — PROGRESS NOTE ADULT - ASSESSMENT
78M S/P laparoscopic lysis of adhesions, converted to open lysis of adhesions, SBR x 3, temporary abdominal closure, S/P ex lap, removal of abthera, ileocolic resection, small bowel anastomosis c/b anastomotic leak resulting in wound dehiscence now POD8 RTOR exlap, washout, ileocolic resection with end ileostomy, blow hole colostomy, fistula, red rubber from ileostomy to small bowel anastomosis (removed); vicryl bridging mesh; R JOYCE below ileostomy, L JOYCE at small bowel enterotomy repair. Midline wound with vac in place, enteric content, bile and some blood noted on cannister. Wound vac changed, mesh removed. RUQ US 0.4 gallbladder wall, no evidence of portal or hepatic venous thrombosis. Fluid and sludge distended gallbladder. Patient noted to be more drowsy on 7/23, work up done, CT head unremarkable. Doing well tolerating clears, wound vac holding suction, bilious output.     - Continue excellent SICU care   - Plan discussed with Dr Peterson

## 2023-07-28 NOTE — PROGRESS NOTE ADULT - SUBJECTIVE AND OBJECTIVE BOX
Pt seen and examined   no complaints  out of bed    REVIEW OF SYSTEMS:  Constitutional: No fever,   Cardiovascular: No chest pain, palpitations,  Gastrointestinal: No abdominal or epigastric pain. No nausea, vomiting   Skin: No itching, burning, rashes or lesions       MEDICATIONS:  MEDICATIONS  (STANDING):  aMIOdarone    Tablet 200 milliGRAM(s) Oral daily  amLODIPine   Tablet 10 milliGRAM(s) Oral daily  atorvastatin 20 milliGRAM(s) Oral at bedtime  chlorhexidine 2% Cloths 1 Application(s) Topical <User Schedule>  cholestyramine Powder (Sugar-Free) 4 Gram(s) Oral two times a day  dextrose 5%. 1000 milliLiter(s) (100 mL/Hr) IV Continuous <Continuous>  dextrose 5%. 1000 milliLiter(s) (50 mL/Hr) IV Continuous <Continuous>  dextrose 50% Injectable 25 Gram(s) IV Push once  dextrose 50% Injectable 12.5 Gram(s) IV Push once  dextrose 50% Injectable 25 Gram(s) IV Push once  glucagon  Injectable 1 milliGRAM(s) IntraMuscular once  heparin   Injectable 5000 Unit(s) SubCutaneous every 8 hours  imipenem/cilastatin  IVPB 1000 milliGRAM(s) IV Intermittent every 8 hours  insulin lispro (ADMELOG) corrective regimen sliding scale   SubCutaneous every 6 hours  levothyroxine 50 MICROGram(s) Oral daily  lipid, fat emulsion (Fish Oil and Plant Based) 20% Infusion 0.5 Gm/kG/Day (20.83 mL/Hr) IV Continuous <Continuous>  lipid, fat emulsion (Fish Oil and Plant Based) 20% Infusion 0.5 Gm/kG/Day (20.83 mL/Hr) IV Continuous <Continuous>  losartan 25 milliGRAM(s) Oral daily  metoprolol tartrate 25 milliGRAM(s) Oral every 12 hours  octreotide  Injectable 100 MICROGram(s) IV Push every 8 hours  pantoprazole  Injectable 40 milliGRAM(s) IV Push every 12 hours  Parenteral Nutrition - Adult 1 Each (96 mL/Hr) TPN Continuous <Continuous>  Parenteral Nutrition - Adult 1 Each (96 mL/Hr) TPN Continuous <Continuous>  venlafaxine XR. 300 milliGRAM(s) Oral daily    MEDICATIONS  (PRN):  benzocaine/menthol Lozenge 2 Lozenge Oral every 4 hours PRN Sore Throat  dextrose Oral Gel 15 Gram(s) Oral once PRN Blood Glucose LESS THAN 70 milliGRAM(s)/deciliter  hydrALAZINE Injectable 10 milliGRAM(s) IV Push every 4 hours PRN SBP > 140  melatonin 5 milliGRAM(s) Oral at bedtime PRN Sleep      Allergies    penicillin (Angioedema)    Intolerances        Vital Signs Last 24 Hrs  T(C): 36.2 (28 Jul 2023 09:22), Max: 37.1 (27 Jul 2023 17:33)  T(F): 97.1 (28 Jul 2023 09:22), Max: 98.8 (27 Jul 2023 17:33)  HR: 84 (28 Jul 2023 09:40) (77 - 85)  BP: 115/56 (28 Jul 2023 09:40) (97/57 - 155/72)  BP(mean): 80 (28 Jul 2023 09:40) (72 - 107)  RR: 20 (28 Jul 2023 09:40) (16 - 30)  SpO2: 96% (28 Jul 2023 09:40) (93% - 99%)    Parameters below as of 28 Jul 2023 09:40  Patient On (Oxygen Delivery Method): room air        07-27 @ 07:01 - 07-28 @ 07:00  --------------------------------------------------------  IN: 3446.6 mL / OUT: 3625 mL / NET: -178.4 mL    07-28 @ 07:01 - 07-28 @ 10:23  --------------------------------------------------------  IN: 408 mL / OUT: 0 mL / NET: 408 mL        PHYSICAL EXAM:    General:  in no acute distress  HEENT: MMM, conjunctiva and sclera clear  Gastrointestinal:   non-distended; Normal bowel sounds; ileostomy and drains  Skin: Warm and dry. No obvious rash    LABS:      CBC Full  -  ( 28 Jul 2023 04:45 )  WBC Count : 14.24 K/uL  RBC Count : 2.91 M/uL  Hemoglobin : 8.6 g/dL  Hematocrit : 28.0 %  Platelet Count - Automated : 318 K/uL  Mean Cell Volume : 96.2 fl  Mean Cell Hemoglobin : 29.6 pg  Mean Cell Hemoglobin Concentration : 30.7 gm/dL  Auto Neutrophil # : x  Auto Lymphocyte # : x  Auto Monocyte # : x  Auto Eosinophil # : x  Auto Basophil # : x  Auto Neutrophil % : x  Auto Lymphocyte % : x  Auto Monocyte % : x  Auto Eosinophil % : x  Auto Basophil % : x    07-28    136  |  105  |  21  ----------------------------<  117<H>  4.3   |  24  |  0.73    Ca    7.5<L>      28 Jul 2023 04:45  Phos  2.9     07-28  Mg     2.0     07-28    TPro  7.2  /  Alb  2.2<L>  /  TBili  4.0<H>  /  DBili  x   /  AST  189<H>  /  ALT  128<H>  /  AlkPhos  156<H>  07-28          Urinalysis Basic - ( 28 Jul 2023 04:45 )    Color: x / Appearance: x / SG: x / pH: x  Gluc: 117 mg/dL / Ketone: x  / Bili: x / Urobili: x   Blood: x / Protein: x / Nitrite: x   Leuk Esterase: x / RBC: x / WBC x   Sq Epi: x / Non Sq Epi: x / Bacteria: x                RADIOLOGY & ADDITIONAL STUDIES (The following images were personally reviewed):

## 2023-07-29 NOTE — PROGRESS NOTE ADULT - SUBJECTIVE AND OBJECTIVE BOX
INTERVAL HPI/OVERNIGHT EVENTS:  Overnight, wound vac was changed. Otherwise, AMRITA.     SUBJECTIVE:  This AM, patient reports that he slept well, without any acute complaints overnight.  Tolerating CLD, -n/-v, -sob/-cp. Pain well controlled on current regimen. Wound vac to suction overnight after patched earlier yesterday evening.     MEDICATIONS  (STANDING):  aMIOdarone    Tablet 200 milliGRAM(s) Oral daily  amLODIPine   Tablet 10 milliGRAM(s) Oral daily  atorvastatin 20 milliGRAM(s) Oral at bedtime  chlorhexidine 2% Cloths 1 Application(s) Topical <User Schedule>  cholestyramine Powder (Sugar-Free) 4 Gram(s) Oral two times a day  dextrose 5%. 1000 milliLiter(s) (100 mL/Hr) IV Continuous <Continuous>  dextrose 5%. 1000 milliLiter(s) (50 mL/Hr) IV Continuous <Continuous>  dextrose 50% Injectable 25 Gram(s) IV Push once  dextrose 50% Injectable 12.5 Gram(s) IV Push once  dextrose 50% Injectable 25 Gram(s) IV Push once  glucagon  Injectable 1 milliGRAM(s) IntraMuscular once  heparin   Injectable 5000 Unit(s) SubCutaneous every 8 hours  imipenem/cilastatin  IVPB 1000 milliGRAM(s) IV Intermittent every 8 hours  insulin lispro (ADMELOG) corrective regimen sliding scale   SubCutaneous every 6 hours  levothyroxine 50 MICROGram(s) Oral daily  losartan 25 milliGRAM(s) Oral daily  metoprolol tartrate 25 milliGRAM(s) Oral every 12 hours  octreotide  Injectable 100 MICROGram(s) IV Push every 8 hours  pantoprazole  Injectable 40 milliGRAM(s) IV Push every 12 hours  Parenteral Nutrition - Adult 1 Each (96 mL/Hr) TPN Continuous <Continuous>  venlafaxine XR. 300 milliGRAM(s) Oral daily    MEDICATIONS  (PRN):  benzocaine/menthol Lozenge 2 Lozenge Oral every 4 hours PRN Sore Throat  dextrose Oral Gel 15 Gram(s) Oral once PRN Blood Glucose LESS THAN 70 milliGRAM(s)/deciliter  hydrALAZINE Injectable 10 milliGRAM(s) IV Push every 4 hours PRN SBP > 140  melatonin 5 milliGRAM(s) Oral at bedtime PRN Sleep      Vital Signs Last 24 Hrs  T(C): 36.6 (29 Jul 2023 09:24), Max: 36.8 (29 Jul 2023 01:01)  T(F): 97.9 (29 Jul 2023 09:24), Max: 98.2 (29 Jul 2023 01:01)  HR: 79 (29 Jul 2023 09:00) (75 - 83)  BP: 150/66 (29 Jul 2023 09:00) (115/59 - 164/72)  BP(mean): 95 (29 Jul 2023 09:00) (81 - 103)  RR: 21 (29 Jul 2023 09:00) (18 - 32)  SpO2: 98% (29 Jul 2023 09:00) (96% - 100%)    Parameters below as of 29 Jul 2023 09:00  Patient On (Oxygen Delivery Method): room air        PHYSICAL EXAM:  General: Resting in bed, NAD, mild scleral icterus, mild jaundice.    HEENT: Mucous membranes are moist.   Pulmonary: CTAB, breathing comfortably on RA. No increased WOB.   Cardiovascular: RRR, no m/r/g  Abdominal: soft, NTND abdomen. L. abdominal JOYCE is bilious, placed to LIWS,  from wound vac with ostomy paste. Wound Vac in place to suction, with good seal, with green output. Ostomy in R abdomen with bilious output, minimal gas in bag.   Extremities: WWP   Neuro: A/Ox3, CNs II-XII grossly intact, sensorimotor function grossly intact in all 4 extremities.  Pulses: palpable distal pulses      I&O's Detail    28 Jul 2023 07:01  -  29 Jul 2023 07:00  --------------------------------------------------------  IN:    IV PiggyBack: 500 mL    Oral Fluid: 660 mL    TPN (Total Parenteral Nutrition): 2304 mL  Total IN: 3464 mL    OUT:    Bulb (mL): 50 mL    Drain (mL): 40 mL    Drain (mL): 100 mL    Fat Emulsion (Fish Oil &amp; Plant Based) 20% Infusion: 0 mL    VAC (Vacuum Assisted Closure) System (mL): 300 mL    Voided (mL): 1900 mL  Total OUT: 2390 mL    Total NET: 1074 mL      29 Jul 2023 07:01  -  29 Jul 2023 10:47  --------------------------------------------------------  IN:    TPN (Total Parenteral Nutrition): 192 mL  Total IN: 192 mL    OUT:    Drain (mL): 30 mL    Drain (mL): 40 mL    VAC (Vacuum Assisted Closure) System (mL): 250 mL    Voided (mL): 500 mL  Total OUT: 820 mL    Total NET: -628 mL          LABS:                        9.0    12.32 )-----------( 336      ( 29 Jul 2023 05:13 )             28.4     07-29    137  |  108  |  23  ----------------------------<  112<H>  4.5   |  24  |  0.76    Ca    7.8<L>      29 Jul 2023 05:13  Phos  3.0     07-29  Mg     1.9     07-29    TPro  7.2  /  Alb  2.0<L>  /  TBili  3.5<H>  /  DBili  x   /  AST  186<H>  /  ALT  133<H>  /  AlkPhos  172<H>  07-29      Urinalysis Basic - ( 29 Jul 2023 05:13 )    Color: x / Appearance: x / SG: x / pH: x  Gluc: 112 mg/dL / Ketone: x  / Bili: x / Urobili: x   Blood: x / Protein: x / Nitrite: x   Leuk Esterase: x / RBC: x / WBC x   Sq Epi: x / Non Sq Epi: x / Bacteria: x        RADIOLOGY & ADDITIONAL STUDIES:

## 2023-07-29 NOTE — PROGRESS NOTE ADULT - ASSESSMENT
78M w/ Crohn's, AFib/Flutter s/p DCCVs on amiodarone, remote ileocectomy and open appy here for SBO vs Crohns flare, s/p NGT decompression and now s/p lap TRE converted to open TRE, SBR x 3, left in discontinuity with abthera vac on 6/27, RTOR for ileocolic resection, small bowel anastomosis, and abdominal wall closure on 6/28, and s/p IR aspiration of perihepatic fluid on 7/3, stepped down to telemetery on 7/4. wound dehisence on 7/5, RTOR exlap, washout, ileocolic resection with end ileostomy, blow hole colostomy, red rubber from ileostomy to small bowel anastomosis; vicryl bridging mesh on 7/5. Transferred to SICU post op for HD monitoring. Extubated 7/6.    Neuro: Pain well controlled. Hx anxiety: Continue home venlafaxine  CV: MAP > 65. Hx of Afib/flutter: s/p DCCV, on PO amiodarone, metoprolol. Hx of HTN- continue home losartan, new amlodipine 10 qd. IV Hydralazine PRN for SBP < 140 q4h. Hx of HLD: continue home rosuvastatin; Repeat TTE  (07/18) - PASP 64mmHg, EF 65-70%%. Previously total body overloaded - now euvolemic.  Pulm: Extubated 7/6 - now on RA, saturating well. s/p Chest tube by CT surg (6/27--7/3)   GI: Continue CLD. TPN/lipids, wound dehisense -- RTOR on 7/5 for exlap, washout, ileocolic resection with end ileostomy, blow hole colostomy; vicryl bridging mesh explanted and vac replaced 2 x week, vac cannisters changed q6h- Continue Octreotide and cholestyramine. Monitoring LFTs daily  : Voids.   ID: Blood Cx sent 7/23 - NGTD. Empiric coverage with imipenem ((7/23-7/29) -- ID signed off Dapto (7/23--) IR Cx 7/3: C. tertium, Lactobacillis. OR Cx 7/5 - rare yeast - Imipenem (6/30-7/12), Fluconazole (6/30 -7/12) OR Cx 6/28: Vanc-resistant/amp-sens (but allergic) enterococcus s/p CTX (6/26-6/30), Flagyl (6/26-6/30), Dapto (6/30-7/5).  Endo: ISS; Hx of hypothyroid: cont synthroid. Gout: holding home allopurionol  PPx: SCD, HSQ.  Lines: PIV x 2, RUE PICC (6/30--), [place] (7/8-7/10) // DC: R Chest tube for iatrogenic PTX (6/27--7/3), R TLC (06/26-30). R TLC (7/5 -7/12)  Wounds/Drains: midline incision; wound vac changes q48h with fistula opening; R JOYCE below ileostomy (D/C 7/17), L JOYCE at small bowel enterotomy repair - to LIWS.  PT/OT: CONRADO  Dispo: SICU

## 2023-07-29 NOTE — PROGRESS NOTE ADULT - ASSESSMENT
78M S/P laparoscopic lysis of adhesions, converted to open lysis of adhesions, SBR x 3, temporary abdominal closure, S/P ex lap, removal of abthera, ileocolic resection, small bowel anastomosis c/b anastomotic leak resulting in wound dehiscence now POD8 RTOR exlap, washout, ileocolic resection with end ileostomy, blow hole colostomy, fistula, red rubber from ileostomy to small bowel anastomosis (removed); vicryl bridging mesh; R JOYCE below ileostomy, L JOYEC at small bowel enterotomy repair. Midline wound with vac in place, enteric content, bile and some blood noted on cannister. Wound vac changed, mesh removed. RUQ US 0.4 gallbladder wall, no evidence of portal or hepatic venous thrombosis. Fluid and sludge distended gallbladder. Patient noted to be more drowsy on 7/23, work up done, CT head unremarkable. Doing well tolerating clears, wound vac holding suction, bilious output.     - Continue TPN  - Plan for wound vac change on Monday  - Continued care per SICU team

## 2023-07-29 NOTE — PROGRESS NOTE ADULT - SUBJECTIVE AND OBJECTIVE BOX
SUBJECTIVE:  Patient was seen at bedside this morning with chief resident. Patient is in good spirits. Has no complaints of nausea, vomiting, or pain.     MEDICATIONS  (STANDING):  aMIOdarone    Tablet 200 milliGRAM(s) Oral daily  amLODIPine   Tablet 10 milliGRAM(s) Oral daily  atorvastatin 20 milliGRAM(s) Oral at bedtime  chlorhexidine 2% Cloths 1 Application(s) Topical <User Schedule>  cholestyramine Powder (Sugar-Free) 4 Gram(s) Oral two times a day  dextrose 5%. 1000 milliLiter(s) (100 mL/Hr) IV Continuous <Continuous>  dextrose 5%. 1000 milliLiter(s) (50 mL/Hr) IV Continuous <Continuous>  dextrose 50% Injectable 25 Gram(s) IV Push once  dextrose 50% Injectable 12.5 Gram(s) IV Push once  dextrose 50% Injectable 25 Gram(s) IV Push once  glucagon  Injectable 1 milliGRAM(s) IntraMuscular once  heparin   Injectable 5000 Unit(s) SubCutaneous every 8 hours  imipenem/cilastatin  IVPB 1000 milliGRAM(s) IV Intermittent every 8 hours  insulin lispro (ADMELOG) corrective regimen sliding scale   SubCutaneous every 6 hours  levothyroxine 50 MICROGram(s) Oral daily  lipid, fat emulsion (Fish Oil and Plant Based) 20% Infusion 0.5 Gm/kG/Day (20.83 mL/Hr) IV Continuous <Continuous>  losartan 25 milliGRAM(s) Oral daily  metoprolol tartrate 25 milliGRAM(s) Oral every 12 hours  octreotide  Injectable 100 MICROGram(s) IV Push every 8 hours  pantoprazole  Injectable 40 milliGRAM(s) IV Push every 12 hours  Parenteral Nutrition - Adult 1 Each (96 mL/Hr) TPN Continuous <Continuous>  Parenteral Nutrition - Adult 1 Each (96 mL/Hr) TPN Continuous <Continuous>  venlafaxine XR. 300 milliGRAM(s) Oral daily    MEDICATIONS  (PRN):  benzocaine/menthol Lozenge 2 Lozenge Oral every 4 hours PRN Sore Throat  dextrose Oral Gel 15 Gram(s) Oral once PRN Blood Glucose LESS THAN 70 milliGRAM(s)/deciliter  hydrALAZINE Injectable 10 milliGRAM(s) IV Push every 4 hours PRN SBP > 140  melatonin 5 milliGRAM(s) Oral at bedtime PRN Sleep      Vital Signs Last 24 Hrs  T(C): 36.4 (29 Jul 2023 13:57), Max: 36.8 (29 Jul 2023 01:01)  T(F): 97.5 (29 Jul 2023 13:57), Max: 98.2 (29 Jul 2023 01:01)  HR: 79 (29 Jul 2023 18:00) (75 - 81)  BP: 136/65 (29 Jul 2023 18:00) (133/65 - 164/72)  BP(mean): 93 (29 Jul 2023 18:00) (91 - 103)  RR: 19 (29 Jul 2023 18:00) (18 - 32)  SpO2: 96% (29 Jul 2023 18:00) (95% - 99%)    Parameters below as of 29 Jul 2023 18:00  Patient On (Oxygen Delivery Method): room air        Physical Exam:  General: NAD, resting comfortably in bed  Pulmonary: Nonlabored breathing, no respiratory distress  Cardiovascular: NSR  Abdominal: soft, NT/ND; JOYCE in L abdomen; wound vac in place with brown output; ostomy on R abdomen with bilious output  Extremities: WWP, normal strength  Neuro: A/O x 3, CNs II-XII grossly intact, no focal deficits, normal motor/sensation  Pulses: palpable distal pulses    I&O's Summary    28 Jul 2023 07:01  -  29 Jul 2023 07:00  --------------------------------------------------------  IN: 3464 mL / OUT: 2390 mL / NET: 1074 mL    29 Jul 2023 07:01  -  29 Jul 2023 18:10  --------------------------------------------------------  IN: 1251.6 mL / OUT: 2340 mL / NET: -1088.4 mL        LABS:                        9.0    12.32 )-----------( 336      ( 29 Jul 2023 05:13 )             28.4     07-29    137  |  108  |  23  ----------------------------<  112<H>  4.5   |  24  |  0.76    Ca    7.8<L>      29 Jul 2023 05:13  Phos  3.0     07-29  Mg     1.9     07-29    TPro  7.2  /  Alb  2.0<L>  /  TBili  3.5<H>  /  DBili  x   /  AST  186<H>  /  ALT  133<H>  /  AlkPhos  172<H>  07-29      Urinalysis Basic - ( 29 Jul 2023 05:13 )    Color: x / Appearance: x / SG: x / pH: x  Gluc: 112 mg/dL / Ketone: x  / Bili: x / Urobili: x   Blood: x / Protein: x / Nitrite: x   Leuk Esterase: x / RBC: x / WBC x   Sq Epi: x / Non Sq Epi: x / Bacteria: x      CAPILLARY BLOOD GLUCOSE      POCT Blood Glucose.: 114 mg/dL (29 Jul 2023 16:25)  POCT Blood Glucose.: 107 mg/dL (29 Jul 2023 06:04)  POCT Blood Glucose.: 107 mg/dL (28 Jul 2023 23:17)  POCT Blood Glucose.: 96 mg/dL (28 Jul 2023 18:18)    LIVER FUNCTIONS - ( 29 Jul 2023 05:13 )  Alb: 2.0 g/dL / Pro: 7.2 g/dL / ALK PHOS: 172 U/L / ALT: 133 U/L / AST: 186 U/L / GGT: x             RADIOLOGY & ADDITIONAL STUDIES:

## 2023-07-29 NOTE — PROGRESS NOTE ADULT - SUBJECTIVE AND OBJECTIVE BOX
(covering for  Dr. Peterson)  5 ICU  Primaxin  TPN  Other meds as before    On limited clears by mouth  No N/V  Comfortable this AM and resting/sleeping  Voiding spontaneously minus problems  No c/o pain.    Vital Signs Last 24 Hrs  T(C): 36.6 (29 Jul 2023 09:24), Max: 36.8 (29 Jul 2023 01:01)  T(F): 97.9 (29 Jul 2023 09:24), Max: 98.2 (29 Jul 2023 01:01)  HR: 79 (29 Jul 2023 09:00) (75 - 83)  BP: 150/66 (29 Jul 2023 09:00) (115/59 - 164/72)  BP(mean): 95 (29 Jul 2023 09:00) (81 - 103)  RR: 21 (29 Jul 2023 09:00) (18 - 32)  SpO2: 98% (29 Jul 2023 09:00) (96% - 100%)    Parameters below as of 29 Jul 2023 09:00  Patient On (Oxygen Delivery Method): room air    abd: scaphoid, non tender, wound vac in place, low level leaking from 2 areas.  Vac reinforced with multiple sheets of tegaderm and also the vac sheets.  Better seal when done.  red rubber drain in place.  Wound manager in place as well.    ext: without calf tenderness    UO 1,200 ml/last shift until 7 AM, 1,900 ml/24 hrs;  500 ml this current shift until 10 AM  main vac container: 300 ml/24 hr, 250 ml/this current shift  red rubber drain (intermittent wall suction):  100 ml/24 hrs; 40 ml/current shift  bulb:  50 ml/24 hrs  MF: 40 ml/shift                            9.0    12.32 )-----------( 336      ( 29 Jul 2023 05:13 )             28.4   07-29    137  |  108  |  23  ----------------------------<  112<H>  4.5   |  24  |  0.76    Ca    7.8<L>      29 Jul 2023 05:13  Phos  3.0     07-29  Mg     1.9     07-29    TPro  7.2  /  Alb  2.0<L>  /  TBili  3.5<H>  /  DBili  x   /  AST  186<H>  /  ALT  133<H>  /  AlkPhos  172<H>  07-29

## 2023-07-29 NOTE — PROGRESS NOTE ADULT - ASSESSMENT
78M POD30 laparoscopic lysis of adhesions, converted to open lysis of adhesions, SBR x 3, temporary abdominal closure, POD29 ex lap, removal of abthera, ileocolic resection, small bowel anastomosis c/b anastomotic leak resulting in wound dehiscence now POD22 RTOR exlap, washout, ileocolic resection with end ileostomy, blow hole colostomy, fistula, red rubber from ileostomy to small bowel anastomosis; vicryl bridging mesh; R JOYCE below ileostomy, L JOYCE at small bowel enterotomy repair.    Please change vac cannister q6h  Closely monitor output and consider repleting as necessary given high volume loss through vac and EC fistula  Encourage ambulation, OOBTC 78M POD30 laparoscopic lysis of adhesions, converted to open lysis of adhesions, SBR x 3, temporary abdominal closure, POD29 ex lap, removal of abthera, ileocolic resection, small bowel anastomosis c/b anastomotic leak resulting in wound dehiscence now POD22 RTOR exlap, washout, ileocolic resection with end ileostomy, blow hole colostomy, fistula, red rubber from ileostomy to small bowel anastomosis; vicryl bridging mesh; R JOYCE below ileostomy, L JOYCE at small bowel enterotomy repair.    Plan:  Closely monitor output and consider repleting as necessary given high volume loss through vac and EC fistula  Encourage ambulation, OOBTC

## 2023-07-29 NOTE — PROGRESS NOTE ADULT - SUBJECTIVE AND OBJECTIVE BOX
STATUS POST:  Exploratory laparotomy    Laparoscopic lysis of intestinal adhesions    Exploratory laparotomy    Open lysis of intestinal adhesions    Resection of small bowel    Temporary closure of abdominal cavity    Repair, anastomosis, ileocolic    Small bowel resection with anastomosis    Flap, myocutaneous, abdominal wall    Reconstruction of abdominal wall using mesh    Washout of abdominal cavity    Creation of ileostomy    Creation of colostomy    Laparoscopic lysis of intestinal adhesions    Open lysis of intestinal adhesions    Resection of small bowel    Temporary closure of abdominal cavity    Repair, anastomosis, ileocolic    Small bowel resection with anastomosis    Flap, myocutaneous, abdominal wall    Reconstruction of abdominal wall using mesh    Washout of abdominal cavity        POST OPERATIVE DAY #:     SUBJECTIVE:     Vital Signs Last 24 Hrs  T(C): 36.4 (29 Jul 2023 13:57), Max: 36.8 (29 Jul 2023 01:01)  T(F): 97.5 (29 Jul 2023 13:57), Max: 98.2 (29 Jul 2023 01:01)  HR: 79 (29 Jul 2023 15:00) (75 - 82)  BP: 137/67 (29 Jul 2023 15:00) (115/59 - 164/72)  BP(mean): 96 (29 Jul 2023 15:00) (81 - 103)  RR: 23 (29 Jul 2023 15:00) (18 - 32)  SpO2: 95% (29 Jul 2023 15:00) (95% - 100%)    Parameters below as of 29 Jul 2023 15:00  Patient On (Oxygen Delivery Method): room air        I&O's Summary    28 Jul 2023 07:01  -  29 Jul 2023 07:00  --------------------------------------------------------  IN: 3464 mL / OUT: 2390 mL / NET: 1074 mL    29 Jul 2023 07:01  -  29 Jul 2023 15:43  --------------------------------------------------------  IN: 826 mL / OUT: 1620 mL / NET: -794 mL        Physical Exam:  General Appearance: Appears well, NAD  Pulmonary: Nonlabored breathing, no respiratory distress  Cardiovascular: NSR  Abdomen: Soft, nondisteded, appropriate incisional tenderness, dressings clean and dry and intact  Extremities: WWP, SCD's in place     LABS:                        9.0    12.32 )-----------( 336      ( 29 Jul 2023 05:13 )             28.4     07-29    137  |  108  |  23  ----------------------------<  112<H>  4.5   |  24  |  0.76    Ca    7.8<L>      29 Jul 2023 05:13  Phos  3.0     07-29  Mg     1.9     07-29    TPro  7.2  /  Alb  2.0<L>  /  TBili  3.5<H>  /  DBili  x   /  AST  186<H>  /  ALT  133<H>  /  AlkPhos  172<H>  07-29      Urinalysis Basic - ( 29 Jul 2023 05:13 )    Color: x / Appearance: x / SG: x / pH: x  Gluc: 112 mg/dL / Ketone: x  / Bili: x / Urobili: x   Blood: x / Protein: x / Nitrite: x   Leuk Esterase: x / RBC: x / WBC x   Sq Epi: x / Non Sq Epi: x / Bacteria: x       STATUS POST:  Exploratory laparotomy    Laparoscopic lysis of intestinal adhesions    Exploratory laparotomy    Open lysis of intestinal adhesions    Resection of small bowel    Temporary closure of abdominal cavity    Repair, anastomosis, ileocolic    Small bowel resection with anastomosis    Flap, myocutaneous, abdominal wall    Reconstruction of abdominal wall using mesh    Washout of abdominal cavity    Creation of ileostomy    Creation of colostomy    Laparoscopic lysis of intestinal adhesions    Open lysis of intestinal adhesions    Resection of small bowel    Temporary closure of abdominal cavity    Repair, anastomosis, ileocolic    Small bowel resection with anastomosis    Flap, myocutaneous, abdominal wall    Reconstruction of abdominal wall using mesh    Washout of abdominal cavity      SUBJECTIVE: Pt seen and examined at the bedside by surgical team. Reports doing well without any acute events overnight, tolerating diet. Pt denies any N/V/SOB/CP, pain well controlled.        Vital Signs Last 24 Hrs  T(C): 36.4 (29 Jul 2023 13:57), Max: 36.8 (29 Jul 2023 01:01)  T(F): 97.5 (29 Jul 2023 13:57), Max: 98.2 (29 Jul 2023 01:01)  HR: 79 (29 Jul 2023 15:00) (75 - 82)  BP: 137/67 (29 Jul 2023 15:00) (115/59 - 164/72)  BP(mean): 96 (29 Jul 2023 15:00) (81 - 103)  RR: 23 (29 Jul 2023 15:00) (18 - 32)  SpO2: 95% (29 Jul 2023 15:00) (95% - 100%)    Parameters below as of 29 Jul 2023 15:00  Patient On (Oxygen Delivery Method): room air        I&O's Summary    28 Jul 2023 07:01  -  29 Jul 2023 07:00  --------------------------------------------------------  IN: 3464 mL / OUT: 2390 mL / NET: 1074 mL    29 Jul 2023 07:01  -  29 Jul 2023 15:43  --------------------------------------------------------  IN: 826 mL / OUT: 1620 mL / NET: -794 mL    Physical Exam:  General Appearance: Appears well, NAD, resting in bed  Pulmonary: Nonlabored breathing, no respiratory distress  Cardiovascular: NSR  Abdomen: Soft, nondisteded, JOYCE drain in place, wound vac in place, RT abd ostomy in place.  Extremities: WWP      LABS:                        9.0    12.32 )-----------( 336      ( 29 Jul 2023 05:13 )             28.4     07-29    137  |  108  |  23  ----------------------------<  112<H>  4.5   |  24  |  0.76    Ca    7.8<L>      29 Jul 2023 05:13  Phos  3.0     07-29  Mg     1.9     07-29    TPro  7.2  /  Alb  2.0<L>  /  TBili  3.5<H>  /  DBili  x   /  AST  186<H>  /  ALT  133<H>  /  AlkPhos  172<H>  07-29      Urinalysis Basic - ( 29 Jul 2023 05:13 )    Color: x / Appearance: x / SG: x / pH: x  Gluc: 112 mg/dL / Ketone: x  / Bili: x / Urobili: x   Blood: x / Protein: x / Nitrite: x   Leuk Esterase: x / RBC: x / WBC x   Sq Epi: x / Non Sq Epi: x / Bacteria: x

## 2023-07-29 NOTE — PROGRESS NOTE ADULT - ATTENDING COMMENTS
Crohns with small bowel obstruction s/p small bowel resection with ileocolic anastomosis c/b dehiscence requiring washout, end ileostomy, blowhole colostomy, vac for fistulized leaks. Tolerating TPN and clears. d/c  imipenem today. Rest as above.

## 2023-07-30 NOTE — PROGRESS NOTE ADULT - SUBJECTIVE AND OBJECTIVE BOX
Pt seen and examined   binder on as dressings leak  tolerating ensure 2 per day    REVIEW OF SYSTEMS:  Constitutional: No fever,  Cardiovascular: No chest pain, palpitations, dizziness   Gastrointestinal: No abdominal or epigastric pain. No nausea, vomiting  Skin: No itching, burning, rashes or lesions       MEDICATIONS:  MEDICATIONS  (STANDING):  aMIOdarone    Tablet 200 milliGRAM(s) Oral daily  amLODIPine   Tablet 10 milliGRAM(s) Oral daily  atorvastatin 20 milliGRAM(s) Oral at bedtime  chlorhexidine 2% Cloths 1 Application(s) Topical <User Schedule>  cholestyramine Powder (Sugar-Free) 4 Gram(s) Oral two times a day  dextrose 5%. 1000 milliLiter(s) (50 mL/Hr) IV Continuous <Continuous>  dextrose 5%. 1000 milliLiter(s) (100 mL/Hr) IV Continuous <Continuous>  dextrose 50% Injectable 25 Gram(s) IV Push once  dextrose 50% Injectable 12.5 Gram(s) IV Push once  dextrose 50% Injectable 25 Gram(s) IV Push once  glucagon  Injectable 1 milliGRAM(s) IntraMuscular once  heparin   Injectable 5000 Unit(s) SubCutaneous every 8 hours  insulin lispro (ADMELOG) corrective regimen sliding scale   SubCutaneous every 6 hours  levothyroxine 50 MICROGram(s) Oral daily  lipid, fat emulsion (Fish Oil and Plant Based) 20% Infusion 0.5 Gm/kG/Day (20.83 mL/Hr) IV Continuous <Continuous>  losartan 25 milliGRAM(s) Oral daily  metoprolol tartrate 25 milliGRAM(s) Oral every 12 hours  nystatin Powder 1 Application(s) Topical three times a day  octreotide  Injectable 100 MICROGram(s) IV Push every 8 hours  pantoprazole  Injectable 40 milliGRAM(s) IV Push every 12 hours  Parenteral Nutrition - Adult 1 Each (96 mL/Hr) TPN Continuous <Continuous>  Parenteral Nutrition - Adult 1 Each (96 mL/Hr) TPN Continuous <Continuous>  venlafaxine XR. 300 milliGRAM(s) Oral daily    MEDICATIONS  (PRN):  benzocaine/menthol Lozenge 2 Lozenge Oral every 4 hours PRN Sore Throat  dextrose Oral Gel 15 Gram(s) Oral once PRN Blood Glucose LESS THAN 70 milliGRAM(s)/deciliter  hydrALAZINE Injectable 10 milliGRAM(s) IV Push every 4 hours PRN SBP > 140  melatonin 5 milliGRAM(s) Oral at bedtime PRN Sleep      Allergies    penicillin (Angioedema)    Intolerances        Vital Signs Last 24 Hrs  T(C): 36.7 (30 Jul 2023 13:01), Max: 37 (30 Jul 2023 05:01)  T(F): 98.1 (30 Jul 2023 13:01), Max: 98.6 (30 Jul 2023 05:01)  HR: 78 (30 Jul 2023 15:00) (78 - 83)  BP: 123/57 (30 Jul 2023 15:00) (116/60 - 152/71)  BP(mean): 82 (30 Jul 2023 15:00) (78 - 102)  RR: 21 (30 Jul 2023 15:00) (18 - 31)  SpO2: 93% (30 Jul 2023 15:00) (93% - 99%)    Parameters below as of 30 Jul 2023 16:00  Patient On (Oxygen Delivery Method): room air        07-29 @ 07:01  -  07-30 @ 07:00  --------------------------------------------------------  IN: 3249.2 mL / OUT: 4205 mL / NET: -955.8 mL    07-30 @ 07:01  -  07-30 @ 15:53  --------------------------------------------------------  IN: 1214 mL / OUT: 1000 mL / NET: 214 mL        PHYSICAL EXAM:    General:  in no acute distress  HEENT: MMM, conjunctiva and sclera clear  Gastrointestinal:  non-distended; Normal bowel sounds; Binder, drains, ileostomy  Skin: Warm and dry. No obvious rash    LABS:      CBC Full  -  ( 30 Jul 2023 05:22 )  WBC Count : 12.40 K/uL  RBC Count : 2.88 M/uL  Hemoglobin : 8.8 g/dL  Hematocrit : 28.0 %  Platelet Count - Automated : 335 K/uL  Mean Cell Volume : 97.2 fl  Mean Cell Hemoglobin : 30.6 pg  Mean Cell Hemoglobin Concentration : 31.4 gm/dL  Auto Neutrophil # : x  Auto Lymphocyte # : x  Auto Monocyte # : x  Auto Eosinophil # : x  Auto Basophil # : x  Auto Neutrophil % : x  Auto Lymphocyte % : x  Auto Monocyte % : x  Auto Eosinophil % : x  Auto Basophil % : x    07-30    133<L>  |  104  |  18  ----------------------------<  100<H>  4.2   |  23  |  0.74    Ca    7.8<L>      30 Jul 2023 05:22  Phos  2.6     07-30  Mg     1.8     07-30    TPro  7.1  /  Alb  2.0<L>  /  TBili  3.5<H>  /  DBili  x   /  AST  145<H>  /  ALT  112<H>  /  AlkPhos  174<H>  07-30          Urinalysis Basic - ( 30 Jul 2023 05:22 )    Color: x / Appearance: x / SG: x / pH: x  Gluc: 100 mg/dL / Ketone: x  / Bili: x / Urobili: x   Blood: x / Protein: x / Nitrite: x   Leuk Esterase: x / RBC: x / WBC x   Sq Epi: x / Non Sq Epi: x / Bacteria: x                RADIOLOGY & ADDITIONAL STUDIES (The following images were personally reviewed):

## 2023-07-30 NOTE — PROGRESS NOTE ADULT - ASSESSMENT
78M S/P laparoscopic lysis of adhesions, converted to open lysis of adhesions, SBR x 3, temporary abdominal closure, S/P ex lap, removal of abthera, ileocolic resection, small bowel anastomosis c/b anastomotic leak resulting in wound dehiscence now POD8 RTOR exlap, washout, ileocolic resection with end ileostomy, blow hole colostomy, fistula, red rubber from ileostomy to small bowel anastomosis (removed); vicryl bridging mesh; R JOYCE below ileostomy, L JOYCE at small bowel enterotomy repair. Midline wound with vac in place, enteric content, bile and some blood noted on cannister. Wound vac changed, mesh removed. RUQ US 0.4 gallbladder wall, no evidence of portal or hepatic venous thrombosis. Fluid and sludge distended gallbladder. Patient noted to be more drowsy on 7/23, work up done, CT head unremarkable. Doing well tolerating clears, wound vac holding suction, bilious output.     - Continue TPN  - Plan for wound vac change on Monday  - Continued care per SICU team  78M S/P laparoscopic lysis of adhesions, converted to open lysis of adhesions, SBR x 3, temporary abdominal closure, S/P ex lap, removal of abthera, ileocolic resection, small bowel anastomosis c/b anastomotic leak resulting in wound dehiscence now POD8 RTOR exlap, washout, ileocolic resection with end ileostomy, blow hole colostomy, fistula, red rubber from ileostomy to small bowel anastomosis (removed); vicryl bridging mesh; R JOYCE below ileostomy, L JOYCE at small bowel enterotomy repair. Midline wound with vac in place, enteric content, bile and some blood noted on cannister. Wound vac changed, mesh removed. RUQ US 0.4 gallbladder wall, no evidence of portal or hepatic venous thrombosis. Fluid and sludge distended gallbladder. Patient noted to be more drowsy on 7/23, work up done, CT head unremarkable. Doing well tolerating clears, wound vac holding suction, bilious output.     - Continue TPN  - Plan for wound vac change on Monday  -vac maintenance as needed   - Continued care per SICU team

## 2023-07-30 NOTE — PROGRESS NOTE ADULT - SUBJECTIVE AND OBJECTIVE BOX
Overnight events:     INCOMPLETE!!    POD#    SUBJECTIVE:      MEDICATIONS  (STANDING):  aMIOdarone    Tablet 200 milliGRAM(s) Oral daily  amLODIPine   Tablet 10 milliGRAM(s) Oral daily  atorvastatin 20 milliGRAM(s) Oral at bedtime  chlorhexidine 2% Cloths 1 Application(s) Topical <User Schedule>  cholestyramine Powder (Sugar-Free) 4 Gram(s) Oral two times a day  dextrose 5%. 1000 milliLiter(s) (50 mL/Hr) IV Continuous <Continuous>  dextrose 5%. 1000 milliLiter(s) (100 mL/Hr) IV Continuous <Continuous>  dextrose 50% Injectable 25 Gram(s) IV Push once  dextrose 50% Injectable 12.5 Gram(s) IV Push once  dextrose 50% Injectable 25 Gram(s) IV Push once  glucagon  Injectable 1 milliGRAM(s) IntraMuscular once  heparin   Injectable 5000 Unit(s) SubCutaneous every 8 hours  imipenem/cilastatin  IVPB 1000 milliGRAM(s) IV Intermittent every 8 hours  insulin lispro (ADMELOG) corrective regimen sliding scale   SubCutaneous every 6 hours  levothyroxine 50 MICROGram(s) Oral daily  lipid, fat emulsion (Fish Oil and Plant Based) 20% Infusion 0.5 Gm/kG/Day (20.83 mL/Hr) IV Continuous <Continuous>  losartan 25 milliGRAM(s) Oral daily  metoprolol tartrate 25 milliGRAM(s) Oral every 12 hours  octreotide  Injectable 100 MICROGram(s) IV Push every 8 hours  pantoprazole  Injectable 40 milliGRAM(s) IV Push every 12 hours  Parenteral Nutrition - Adult 1 Each (96 mL/Hr) TPN Continuous <Continuous>  venlafaxine XR. 300 milliGRAM(s) Oral daily    MEDICATIONS  (PRN):  benzocaine/menthol Lozenge 2 Lozenge Oral every 4 hours PRN Sore Throat  dextrose Oral Gel 15 Gram(s) Oral once PRN Blood Glucose LESS THAN 70 milliGRAM(s)/deciliter  hydrALAZINE Injectable 10 milliGRAM(s) IV Push every 4 hours PRN SBP > 140  melatonin 5 milliGRAM(s) Oral at bedtime PRN Sleep      Vital Signs Last 24 Hrs  T(C): 37 (30 Jul 2023 05:01), Max: 37 (30 Jul 2023 05:01)  T(F): 98.6 (30 Jul 2023 05:01), Max: 98.6 (30 Jul 2023 05:01)  HR: 81 (30 Jul 2023 05:00) (77 - 81)  BP: 138/63 (30 Jul 2023 05:00) (135/65 - 164/72)  BP(mean): 91 (30 Jul 2023 05:00) (91 - 103)  RR: 21 (30 Jul 2023 05:00) (18 - 27)  SpO2: 94% (30 Jul 2023 05:00) (94% - 99%)    Parameters below as of 30 Jul 2023 05:00  Patient On (Oxygen Delivery Method): room air        Physical Exam:  General: NAD, resting comfortably in bed  Pulmonary: Nonlabored breathing, no respiratory distress  Cardiovascular: NSR  Abdominal: soft, NT/ND  Extremities: WWP, normal strength  Neuro: A/O x 3, CNs II-XII grossly intact, no focal deficits, normal motor/sensation  Pulses: palpable distal pulses    I&O's Summary    28 Jul 2023 07:01  -  29 Jul 2023 07:00  --------------------------------------------------------  IN: 3464 mL / OUT: 2390 mL / NET: 1074 mL    29 Jul 2023 07:01  -  30 Jul 2023 05:52  --------------------------------------------------------  IN: 2786.4 mL / OUT: 4130 mL / NET: -1343.6 mL        LABS:                        8.8    12.40 )-----------( 335      ( 30 Jul 2023 05:22 )             28.0     07-29    137  |  108  |  23  ----------------------------<  112<H>  4.5   |  24  |  0.76    Ca    7.8<L>      29 Jul 2023 05:13  Phos  3.0     07-29  Mg     1.9     07-29    TPro  7.2  /  Alb  2.0<L>  /  TBili  3.5<H>  /  DBili  x   /  AST  186<H>  /  ALT  133<H>  /  AlkPhos  172<H>  07-29      Urinalysis Basic - ( 29 Jul 2023 05:13 )    Color: x / Appearance: x / SG: x / pH: x  Gluc: 112 mg/dL / Ketone: x  / Bili: x / Urobili: x   Blood: x / Protein: x / Nitrite: x   Leuk Esterase: x / RBC: x / WBC x   Sq Epi: x / Non Sq Epi: x / Bacteria: x      CAPILLARY BLOOD GLUCOSE      POCT Blood Glucose.: 108 mg/dL (29 Jul 2023 23:35)  POCT Blood Glucose.: 114 mg/dL (29 Jul 2023 16:25)  POCT Blood Glucose.: 107 mg/dL (29 Jul 2023 06:04)    LIVER FUNCTIONS - ( 29 Jul 2023 05:13 )  Alb: 2.0 g/dL / Pro: 7.2 g/dL / ALK PHOS: 172 U/L / ALT: 133 U/L / AST: 186 U/L / GGT: x             RADIOLOGY & ADDITIONAL STUDIES:   Overnight events: small vac leak, suction still maintained     POD#25 RTOP exlap, ileocolic with end ileostomy, washout, blow hole colostomy, fistula, red rubber from ileostomy to small bowel anastomosis    SUBJECTIVE: Patient seen at bedside with chief resident. Patient denies any pain.       MEDICATIONS  (STANDING):  aMIOdarone    Tablet 200 milliGRAM(s) Oral daily  amLODIPine   Tablet 10 milliGRAM(s) Oral daily  atorvastatin 20 milliGRAM(s) Oral at bedtime  chlorhexidine 2% Cloths 1 Application(s) Topical <User Schedule>  cholestyramine Powder (Sugar-Free) 4 Gram(s) Oral two times a day  dextrose 5%. 1000 milliLiter(s) (50 mL/Hr) IV Continuous <Continuous>  dextrose 5%. 1000 milliLiter(s) (100 mL/Hr) IV Continuous <Continuous>  dextrose 50% Injectable 25 Gram(s) IV Push once  dextrose 50% Injectable 12.5 Gram(s) IV Push once  dextrose 50% Injectable 25 Gram(s) IV Push once  glucagon  Injectable 1 milliGRAM(s) IntraMuscular once  heparin   Injectable 5000 Unit(s) SubCutaneous every 8 hours  imipenem/cilastatin  IVPB 1000 milliGRAM(s) IV Intermittent every 8 hours  insulin lispro (ADMELOG) corrective regimen sliding scale   SubCutaneous every 6 hours  levothyroxine 50 MICROGram(s) Oral daily  lipid, fat emulsion (Fish Oil and Plant Based) 20% Infusion 0.5 Gm/kG/Day (20.83 mL/Hr) IV Continuous <Continuous>  losartan 25 milliGRAM(s) Oral daily  metoprolol tartrate 25 milliGRAM(s) Oral every 12 hours  octreotide  Injectable 100 MICROGram(s) IV Push every 8 hours  pantoprazole  Injectable 40 milliGRAM(s) IV Push every 12 hours  Parenteral Nutrition - Adult 1 Each (96 mL/Hr) TPN Continuous <Continuous>  venlafaxine XR. 300 milliGRAM(s) Oral daily    MEDICATIONS  (PRN):  benzocaine/menthol Lozenge 2 Lozenge Oral every 4 hours PRN Sore Throat  dextrose Oral Gel 15 Gram(s) Oral once PRN Blood Glucose LESS THAN 70 milliGRAM(s)/deciliter  hydrALAZINE Injectable 10 milliGRAM(s) IV Push every 4 hours PRN SBP > 140  melatonin 5 milliGRAM(s) Oral at bedtime PRN Sleep      Vital Signs Last 24 Hrs  T(C): 37 (30 Jul 2023 05:01), Max: 37 (30 Jul 2023 05:01)  T(F): 98.6 (30 Jul 2023 05:01), Max: 98.6 (30 Jul 2023 05:01)  HR: 81 (30 Jul 2023 05:00) (77 - 81)  BP: 138/63 (30 Jul 2023 05:00) (135/65 - 164/72)  BP(mean): 91 (30 Jul 2023 05:00) (91 - 103)  RR: 21 (30 Jul 2023 05:00) (18 - 27)  SpO2: 94% (30 Jul 2023 05:00) (94% - 99%)    Parameters below as of 30 Jul 2023 05:00  Patient On (Oxygen Delivery Method): room air        Physical Exam:  General: NAD, resting comfortably in bed  Pulmonary: Nonlabored breathing, no respiratory distress  Cardiovascular: NSR  Abdominal: soft, vac with visible suction with red rubber drain, JOYCE drain bilious left abdomen   Extremities: WWP, normal strength  Neuro: A/O x 3, CNs II-XII grossly intact, no focal deficits, normal motor/sensation  Pulses: palpable distal pulses    I&O's Summary    28 Jul 2023 07:01  -  29 Jul 2023 07:00  --------------------------------------------------------  IN: 3464 mL / OUT: 2390 mL / NET: 1074 mL    29 Jul 2023 07:01  -  30 Jul 2023 05:52  --------------------------------------------------------  IN: 2786.4 mL / OUT: 4130 mL / NET: -1343.6 mL        LABS:                        8.8    12.40 )-----------( 335      ( 30 Jul 2023 05:22 )             28.0     07-29    137  |  108  |  23  ----------------------------<  112<H>  4.5   |  24  |  0.76    Ca    7.8<L>      29 Jul 2023 05:13  Phos  3.0     07-29  Mg     1.9     07-29    TPro  7.2  /  Alb  2.0<L>  /  TBili  3.5<H>  /  DBili  x   /  AST  186<H>  /  ALT  133<H>  /  AlkPhos  172<H>  07-29      Urinalysis Basic - ( 29 Jul 2023 05:13 )    Color: x / Appearance: x / SG: x / pH: x  Gluc: 112 mg/dL / Ketone: x  / Bili: x / Urobili: x   Blood: x / Protein: x / Nitrite: x   Leuk Esterase: x / RBC: x / WBC x   Sq Epi: x / Non Sq Epi: x / Bacteria: x      CAPILLARY BLOOD GLUCOSE      POCT Blood Glucose.: 108 mg/dL (29 Jul 2023 23:35)  POCT Blood Glucose.: 114 mg/dL (29 Jul 2023 16:25)  POCT Blood Glucose.: 107 mg/dL (29 Jul 2023 06:04)    LIVER FUNCTIONS - ( 29 Jul 2023 05:13 )  Alb: 2.0 g/dL / Pro: 7.2 g/dL / ALK PHOS: 172 U/L / ALT: 133 U/L / AST: 186 U/L / GGT: x             RADIOLOGY & ADDITIONAL STUDIES:

## 2023-07-30 NOTE — PROGRESS NOTE ADULT - ASSESSMENT
A\P.  Overall stable.  Afeb, VSS  Wound vac/wound manage leaks currently being managed with reinforcement . Next planned dressing change is tomorrow Outputs reasonable and no visible pooling int the wound manager Po intake limited since it increases drainage from the open wound.  On antibiotic as before  No plan for repeat CT for time being.  Reinforce leaks as needed   As before

## 2023-07-30 NOTE — PROGRESS NOTE ADULT - SUBJECTIVE AND OBJECTIVE BOX
Interval Events:  Patient seen and examined at bedside.      Allergies    penicillin (Angioedema)    Intolerances        Vital Signs Last 24 Hrs  T(C): 37 (30 Jul 2023 05:01), Max: 37 (30 Jul 2023 05:01)  T(F): 98.6 (30 Jul 2023 05:01), Max: 98.6 (30 Jul 2023 05:01)  HR: 82 (30 Jul 2023 07:00) (79 - 82)  BP: 129/63 (30 Jul 2023 07:00) (129/63 - 152/71)  BP(mean): 89 (30 Jul 2023 07:00) (89 - 103)  RR: 27 (30 Jul 2023 07:00) (18 - 27)  SpO2: 94% (30 Jul 2023 07:00) (93% - 99%)    Parameters below as of 30 Jul 2023 07:00  Patient On (Oxygen Delivery Method): room air        07-29 @ 07:01  -  07-30 @ 07:00  --------------------------------------------------------  IN: 3249.2 mL / OUT: 4205 mL / NET: -955.8 mL    07-30 @ 07:01  -  07-30 @ 08:10  --------------------------------------------------------  IN: 96 mL / OUT: 0 mL / NET: 96 mL      07-29 @ 07:01  -  07-30 @ 07:00  --------------------------------------------------------  IN: 3249.2 mL / OUT: 4205 mL / NET: -955.8 mL    07-30 @ 07:01  -  07-30 @ 08:10  --------------------------------------------------------  IN: 96 mL / OUT: 0 mL / NET: 96 mL        Physical Exam:     General: Resting in bed, NAD, mild scleral icterus, mild jaundice.    Neuro: A/Ox3, CNs II-XII grossly intact, sensorimotor function grossly intact in all 4 extremities.  HEENT: Mucous membranes are moist.   Pulmonary: CTA No w/r/r  Cardiovascular: RRR, no m/r/g  Abdominal: soft, NTND abdomen. L. abdominal JOYCE is bilious, placed to LIWS,  from wound vac with ostomy paste. Wound Vac in place to suction, with good seal, with green output. Ostomy in R abdomen with bilious output.   Extremities: WWP   Pulses: palpable distal pulses  Skin: no rashes noted  MSK: No joint swelling  Psych: No signs of anxiety or depression      LABS:      CBC Full  -  ( 30 Jul 2023 05:22 )  WBC Count : 12.40 K/uL  RBC Count : 2.88 M/uL  Hemoglobin : 8.8 g/dL  Hematocrit : 28.0 %  Platelet Count - Automated : 335 K/uL  Mean Cell Volume : 97.2 fl  Mean Cell Hemoglobin : 30.6 pg  Mean Cell Hemoglobin Concentration : 31.4 gm/dL  Auto Neutrophil # : x  Auto Lymphocyte # : x  Auto Monocyte # : x  Auto Eosinophil # : x  Auto Basophil # : x  Auto Neutrophil % : x  Auto Lymphocyte % : x  Auto Monocyte % : x  Auto Eosinophil % : x  Auto Basophil % : x    07-30    133<L>  |  104  |  18  ----------------------------<  100<H>  4.2   |  23  |  0.74    Ca    7.8<L>      30 Jul 2023 05:22  Phos  2.6     07-30  Mg     1.8     07-30    TPro  7.1  /  Alb  2.0<L>  /  TBili  3.5<H>  /  DBili  x   /  AST  145<H>  /  ALT  112<H>  /  AlkPhos  174<H>  07-30          Urinalysis Basic - ( 30 Jul 2023 05:22 )    Color: x / Appearance: x / SG: x / pH: x  Gluc: 100 mg/dL / Ketone: x  / Bili: x / Urobili: x   Blood: x / Protein: x / Nitrite: x   Leuk Esterase: x / RBC: x / WBC x   Sq Epi: x / Non Sq Epi: x / Bacteria: x              RADIOLOGY & ADDITIONAL STUDIES (The following images were personally reviewed):          A/p:78M w/ Crohn's, AFib/Flutter s/p DCCVs on amiodarone, remote ileocectomy and open appy here for SBO vs Crohns flare, s/p NGT decompression and now s/p lap TRE converted to open TRE, SBR x 3, left in discontinuity with abthera vac on 6/27, RTOR for ileocolic resection, small bowel anastomosis, and abdominal wall closure on 6/28, and s/p IR aspiration of perihepatic fluid on 7/3, stepped down to telemetery on 7/4. wound dehisence on 7/5, RTOR exlap, washout, ileocolic resection with end ileostomy, blow hole colostomy, red rubber from ileostomy to small bowel anastomosis; vicryl bridging mesh on 7/5. Transferred to SICU post op for HD monitoring. Extubated 7/6.    Neuro: Pain well controlled. Hx anxiety: Continue home venlafaxine  CV: MAP > 65. Hx of Afib/flutter: s/p DCCV, on PO amiodarone, metoprolol. Hx of HTN- continue home losartan, new amlodipine 10 qd. IV Hydralazine PRN for SBP < 140 q4h. Hx of HLD: continue home rosuvastatin; Repeat TTE  (07/18) - PASP 64mmHg, EF 65-70%%. Previously total body overloaded - now euvolemic  Pulm: Extubated 7/6 - now on RA, saturating well. s/p Chest tube by CT surg (6/27--7/3)   GI: Continue CLD. TPN/lipids, wound dehisense -- RTOR on 7/5 for exlap, washout, ileocolic resection with end ileostomy, blow hole colostomy; vicryl bridging mesh explanted and vac replaced 2 x week, Continue Octreotide and cholestyramine. Monitoring LFTs daily  : Voids.   ID: Blood Cx sent 7/23 - NGTD. Empiric coverage with imipenem ((7/23-7/30) -- ID signed off Dapto (7/23--7/24) IR Cx 7/3: C. tertium, Lactobacillis. OR Cx 7/5 - rare yeast - Imipenem (6/30-7/12), Fluconazole (6/30 -7/12) OR Cx 6/28: Vanc-resistant/amp-sens (but allergic) enterococcus s/p CTX (6/26-6/30), Flagyl (6/26-6/30), Dapto (6/30-7/5).  Endo: ISS; Hx of hypothyroid: cont synthroid. Gout: holding home allopurionol  PPx: SCD, HSQ.  Lines: PIV x 2, RUE PICC (6/30--), [place] (7/8-7/10) // DC: R Chest tube for iatrogenic PTX (6/27--7/3), R TLC (06/26-30). R TLC (7/5 -7/12)  Wounds/Drains: midline incision; wound vac changes q48h with fistula opening; R JOYCE below ileostomy (D/C 7/17), L JOYCE at small bowel enterotomy repair - to LIWS.  PT/OT: [facility]  Dispo: SICU

## 2023-07-30 NOTE — PROGRESS NOTE ADULT - SUBJECTIVE AND OBJECTIVE BOX
(Covering for Dr. Peterson)  5 ICU  Primaxin  TPN    On clear liquids but not taking that much Awake and alert No abdominal pain Several leakage events dealt with using patches Voiding well     Vital Signs Last 24 Hrs  T(C): 36.7 (30 Jul 2023 10:00), Max: 37 (30 Jul 2023 05:01)  T(F): 98 (30 Jul 2023 10:00), Max: 98.6 (30 Jul 2023 05:01)  HR: 81 (30 Jul 2023 11:00) (79 - 83)  BP: 116/60 (30 Jul 2023 11:00) (116/60 - 152/71)  BP(mean): 82 (30 Jul 2023 11:00) (82 - 102)  RR: 20 (30 Jul 2023 11:00) (18 - 27)  SpO2: 95% (30 Jul 2023 11:00) (93% - 99%)    Parameters below as of 30 Jul 2023 12:00  Patient On (Oxygen Delivery Method): room air    Abd: wound vac in place, red rubber drain into vac dressing, wound manager in place and covered with clear vac sheets.  No leakage currently.  Grenade suction left side, intact.  Mucous fistula viable with little output.  Ext: without calf tenderness, can move both legs     UO:  1500 ml/last shift until 7 AM,  3,470 ml/last 24 hrs;  this current shift until noon 300 ml  Main wound vac:75 ml/last shit, 600 ml/last 24 hours 50 ml this shift  Red rubber: 0 ml/last shift, 40 ml/last 24 hrs,  0 this shift  Luke Lainez grenade suction:  ?    Mucus fistula:  15 ml/last shift;  95 ml/last 24 hours    Labs                        8.8    12.40 )-----------( 335      ( 30 Jul 2023 05:22 )             28.0   07-30    133<L>  |  104  |  18  ----------------------------<  100<H>  4.2   |  23  |  0.74    Ca    7.8<L>      30 Jul 2023 05:22  Phos  2.6     07-30  Mg     1.8     07-30    TPro  7.1  /  Alb  2.0<L>  /  TBili  3.5<H>  /  DBili  x   /  AST  145<H>  /  ALT  112<H>  /  AlkPhos  174<H>  07-30

## 2023-07-31 NOTE — PROGRESS NOTE ADULT - SUBJECTIVE AND OBJECTIVE BOX
Pt seen and examined   no complaints  out of bed  tolerating ensure 2/day    REVIEW OF SYSTEMS:  Constitutional: No fever,  Cardiovascular: No chest pain, palpitations, dizziness  Gastrointestinal: No abdominal or epigastric pain. No nausea, vomiting  Skin: No itching, burning, rashes or lesions       MEDICATIONS:  MEDICATIONS  (STANDING):  aMIOdarone    Tablet 200 milliGRAM(s) Oral daily  amLODIPine   Tablet 10 milliGRAM(s) Oral daily  atorvastatin 20 milliGRAM(s) Oral at bedtime  chlorhexidine 2% Cloths 1 Application(s) Topical <User Schedule>  cholestyramine Powder (Sugar-Free) 4 Gram(s) Oral two times a day  dextrose 5%. 1000 milliLiter(s) (100 mL/Hr) IV Continuous <Continuous>  dextrose 5%. 1000 milliLiter(s) (50 mL/Hr) IV Continuous <Continuous>  dextrose 50% Injectable 25 Gram(s) IV Push once  dextrose 50% Injectable 12.5 Gram(s) IV Push once  dextrose 50% Injectable 25 Gram(s) IV Push once  glucagon  Injectable 1 milliGRAM(s) IntraMuscular once  heparin   Injectable 5000 Unit(s) SubCutaneous every 8 hours  insulin lispro (ADMELOG) corrective regimen sliding scale   SubCutaneous every 6 hours  levothyroxine 50 MICROGram(s) Oral daily  lipid, fat emulsion (Fish Oil and Plant Based) 20% Infusion 0.5 Gm/kG/Day (20.83 mL/Hr) IV Continuous <Continuous>  losartan 25 milliGRAM(s) Oral daily  metoprolol tartrate 25 milliGRAM(s) Oral every 12 hours  nystatin Powder 1 Application(s) Topical three times a day  octreotide  Injectable 100 MICROGram(s) IV Push every 8 hours  pantoprazole  Injectable 40 milliGRAM(s) IV Push every 12 hours  Parenteral Nutrition - Adult 1 Each (96 mL/Hr) TPN Continuous <Continuous>  venlafaxine XR. 300 milliGRAM(s) Oral daily    MEDICATIONS  (PRN):  benzocaine/menthol Lozenge 2 Lozenge Oral every 4 hours PRN Sore Throat  dextrose Oral Gel 15 Gram(s) Oral once PRN Blood Glucose LESS THAN 70 milliGRAM(s)/deciliter  hydrALAZINE Injectable 10 milliGRAM(s) IV Push every 4 hours PRN SBP > 140  melatonin 5 milliGRAM(s) Oral at bedtime PRN Sleep      Allergies    penicillin (Angioedema)    Intolerances        Vital Signs Last 24 Hrs  T(C): 36.6 (31 Jul 2023 09:26), Max: 36.9 (30 Jul 2023 17:25)  T(F): 97.8 (31 Jul 2023 09:26), Max: 98.5 (30 Jul 2023 17:25)  HR: 85 (31 Jul 2023 09:00) (78 - 87)  BP: 134/62 (31 Jul 2023 09:00) (116/60 - 158/73)  BP(mean): 89 (31 Jul 2023 09:00) (78 - 105)  RR: 22 (31 Jul 2023 09:00) (19 - 37)  SpO2: 96% (31 Jul 2023 09:00) (93% - 97%)    Parameters below as of 31 Jul 2023 10:00  Patient On (Oxygen Delivery Method): room air        07-30 @ 07:01 - 07-31 @ 07:00  --------------------------------------------------------  IN: 3167 mL / OUT: 3590 mL / NET: -423 mL    07-31 @ 07:01 - 07-31 @ 10:08  --------------------------------------------------------  IN: 288 mL / OUT: 100 mL / NET: 188 mL        PHYSICAL EXAM:    General:  in no acute distress  HEENT: MMM, conjunctiva and sclera clear  Gastrointestinal:  non-distended; Normal bowel sounds; ileostomy with output  Skin: Warm and dry. No obvious rash    LABS:      CBC Full  -  ( 31 Jul 2023 07:19 )  WBC Count : 14.42 K/uL  RBC Count : 3.02 M/uL  Hemoglobin : 9.1 g/dL  Hematocrit : 29.0 %  Platelet Count - Automated : 347 K/uL  Mean Cell Volume : 96.0 fl  Mean Cell Hemoglobin : 30.1 pg  Mean Cell Hemoglobin Concentration : 31.4 gm/dL  Auto Neutrophil # : x  Auto Lymphocyte # : x  Auto Monocyte # : x  Auto Eosinophil # : x  Auto Basophil # : x  Auto Neutrophil % : x  Auto Lymphocyte % : x  Auto Monocyte % : x  Auto Eosinophil % : x  Auto Basophil % : x    07-31    134<L>  |  101  |  20  ----------------------------<  91  4.2   |  25  |  0.77    Ca    7.7<L>      31 Jul 2023 07:19  Phos  3.3     07-31  Mg     2.0     07-31    TPro  7.3  /  Alb  2.1<L>  /  TBili  3.6<H>  /  DBili  x   /  AST  140<H>  /  ALT  99<H>  /  AlkPhos  177<H>  07-31          Urinalysis Basic - ( 31 Jul 2023 07:19 )    Color: x / Appearance: x / SG: x / pH: x  Gluc: 91 mg/dL / Ketone: x  / Bili: x / Urobili: x   Blood: x / Protein: x / Nitrite: x   Leuk Esterase: x / RBC: x / WBC x   Sq Epi: x / Non Sq Epi: x / Bacteria: x                RADIOLOGY & ADDITIONAL STUDIES (The following images were personally reviewed):

## 2023-07-31 NOTE — PROGRESS NOTE ADULT - SUBJECTIVE AND OBJECTIVE BOX
Subjective:   Patient seen   INTERVAL HPI/OVERNIGHT EVENTS: Fistula with 300 CC output    STATUS POST: RTOP exlap, ileocolic with end ileostomy, washout, blow hole colostomy, fistula, red rubber from ileostomy to small bowel anastomosis exlap    POST OPERATIVE DAY #: 26    SUBJECTIVE: Patient seen at bedside with chief resident. Patient denies any pain      MEDICATIONS  (STANDING):  aMIOdarone    Tablet 200 milliGRAM(s) Oral daily  amLODIPine   Tablet 10 milliGRAM(s) Oral daily  atorvastatin 20 milliGRAM(s) Oral at bedtime  chlorhexidine 2% Cloths 1 Application(s) Topical <User Schedule>  cholestyramine Powder (Sugar-Free) 4 Gram(s) Oral two times a day  dextrose 5%. 1000 milliLiter(s) (100 mL/Hr) IV Continuous <Continuous>  dextrose 5%. 1000 milliLiter(s) (50 mL/Hr) IV Continuous <Continuous>  dextrose 50% Injectable 25 Gram(s) IV Push once  dextrose 50% Injectable 12.5 Gram(s) IV Push once  dextrose 50% Injectable 25 Gram(s) IV Push once  glucagon  Injectable 1 milliGRAM(s) IntraMuscular once  heparin   Injectable 5000 Unit(s) SubCutaneous every 8 hours  insulin lispro (ADMELOG) corrective regimen sliding scale   SubCutaneous every 6 hours  levothyroxine 50 MICROGram(s) Oral daily  lipid, fat emulsion (Fish Oil and Plant Based) 20% Infusion 0.5 Gm/kG/Day (20.83 mL/Hr) IV Continuous <Continuous>  losartan 25 milliGRAM(s) Oral daily  metoprolol tartrate 25 milliGRAM(s) Oral every 12 hours  nystatin Powder 1 Application(s) Topical three times a day  octreotide  Injectable 100 MICROGram(s) IV Push every 8 hours  pantoprazole  Injectable 40 milliGRAM(s) IV Push every 12 hours  Parenteral Nutrition - Adult 1 Each (96 mL/Hr) TPN Continuous <Continuous>  venlafaxine XR. 300 milliGRAM(s) Oral daily    MEDICATIONS  (PRN):  benzocaine/menthol Lozenge 2 Lozenge Oral every 4 hours PRN Sore Throat  dextrose Oral Gel 15 Gram(s) Oral once PRN Blood Glucose LESS THAN 70 milliGRAM(s)/deciliter  hydrALAZINE Injectable 10 milliGRAM(s) IV Push every 4 hours PRN SBP > 140  melatonin 5 milliGRAM(s) Oral at bedtime PRN Sleep      Vital Signs Last 24 Hrs  T(C): 36.5 (31 Jul 2023 04:00), Max: 36.9 (30 Jul 2023 17:25)  T(F): 97.7 (31 Jul 2023 04:00), Max: 98.5 (30 Jul 2023 17:25)  HR: 87 (31 Jul 2023 08:00) (78 - 87)  BP: 135/61 (31 Jul 2023 08:00) (116/60 - 158/73)  BP(mean): 81 (31 Jul 2023 08:00) (78 - 105)  RR: 23 (31 Jul 2023 08:00) (19 - 37)  SpO2: 80% (31 Jul 2023 08:00) (80% - 97%)    Parameters below as of 31 Jul 2023 05:00  Patient On (Oxygen Delivery Method): room air        PHYSICAL EXAM:  Physical Exam:  General: NAD, resting comfortably in bed  Pulmonary: Nonlabored breathing, no respiratory distress  Cardiovascular: NSR  Abdominal: soft, vac with visible suction with red rubber drain, JOYCE drain bilious left abdomen   Extremities: WWP, normal strength  Neuro: A/O x 3, CNs II-XII grossly intact, no focal deficits, normal motor/sensation  Pulses: palpable distal pulses      Constitutional: A&Ox3    Breasts:    Respiratory: non labored breathing, no respiratory distress    Cardiovascular: NSR, RRR    Gastrointestinal:                 Incision:    Genitourinary:    Extremities: (-) edema                  I&O's Detail    30 Jul 2023 07:01  -  31 Jul 2023 07:00  --------------------------------------------------------  IN:    Fat Emulsion (Fish Oil &amp; Plant Based) 20% Infusion: 273 mL    IV PiggyBack: 100 mL    IV PiggyBack: 250 mL    Oral Fluid: 240 mL    TPN (Total Parenteral Nutrition): 2304 mL  Total IN: 3167 mL    OUT:    Bulb (mL): 75 mL    Drain (mL): 500 mL    Drain (mL): 0 mL    Drain (mL): 340 mL    Fat Emulsion (Fish Oil &amp; Plant Based) 20% Infusion: 0 mL    Ileostomy (mL): 50 mL    VAC (Vacuum Assisted Closure) System (mL): 325 mL    Voided (mL): 2300 mL  Total OUT: 3590 mL    Total NET: -423 mL          LABS:                        9.1    14.42 )-----------( 347      ( 31 Jul 2023 07:19 )             29.0     07-31    134<L>  |  101  |  20  ----------------------------<  91  4.2   |  25  |  0.77    Ca    7.7<L>      31 Jul 2023 07:19  Phos  3.3     07-31  Mg     2.0     07-31    TPro  7.3  /  Alb  2.1<L>  /  TBili  3.6<H>  /  DBili  x   /  AST  140<H>  /  ALT  99<H>  /  AlkPhos  177<H>  07-31      Urinalysis Basic - ( 31 Jul 2023 07:19 )    Color: x / Appearance: x / SG: x / pH: x  Gluc: 91 mg/dL / Ketone: x  / Bili: x / Urobili: x   Blood: x / Protein: x / Nitrite: x   Leuk Esterase: x / RBC: x / WBC x   Sq Epi: x / Non Sq Epi: x / Bacteria: x        RADIOLOGY & ADDITIONAL STUDIES:

## 2023-07-31 NOTE — PROGRESS NOTE ADULT - ASSESSMENT
78M w/ Crohn's, AFib/Flutter s/p DCCVs on amiodarone, remote ileocectomy and open appy here for SBO vs Crohns flare, s/p NGT decompression and now s/p lap TRE converted to open TRE, SBR x 3, left in discontinuity with abthera vac on 6/27, RTOR for ileocolic resection, small bowel anastomosis, and abdominal wall closure on 6/28, and s/p IR aspiration of perihepatic fluid on 7/3, stepped down to telemetery on 7/4. wound dehisence on 7/5, RTOR exlap, washout, ileocolic resection with end ileostomy, blow hole colostomy, red rubber from ileostomy to small bowel anastomosis; vicryl bridging mesh on 7/5. Transferred to SICU post op for HD monitoring. Extubated 7/6.  Plan:  - Continue TPN  - NPO w/Sips  - wound vac change today  - Continued care per SICU team

## 2023-07-31 NOTE — PROGRESS NOTE ADULT - ATTENDING COMMENTS
Crohn's, AF, HTN, s/p SBR, anastomotic leak with ileocolic resection, ileostomy, blowhole colostomy  physical as above  LFTs improving  follow off antibiotics  increase protein to 2 gm/kg/day with high output fistula  BP better on amlodipine, losartan, metoprolol  continue amiodarone  decision making of high complexity

## 2023-08-01 NOTE — PROGRESS NOTE ADULT - ASSESSMENT
78M w/ Crohn's, AFib/Flutter s/p DCCVs on amiodarone, remote ileocectomy and open appy here for SBO vs Crohns flare, s/p NGT decompression and now s/p lap TRE converted to open TRE, SBR x 3, left in discontinuity with abthera vac on 6/27, RTOR for ileocolic resection, small bowel anastomosis, and abdominal wall closure on 6/28, and s/p IR aspiration of perihepatic fluid on 7/3, stepped down to telemetery on 7/4. wound dehisence on 7/5, RTOR exlap, washout, ileocolic resection with end ileostomy, blow hole colostomy, red rubber from ileostomy to small bowel anastomosis; vicryl bridging mesh on 7/5. Transferred to SICU post op for HD monitoring. Extubated 7/6.    Neuro: Pain well controlled. Hx anxiety: Continue home venlafaxine  CV: MAP > 65. Hx of Afib/flutter: s/p DCCV, on PO amiodarone, metoprolol. Hx of HTN- continue home losartan, new amlodipine 10 qd. IV Hydralazine PRN for SBP < 140 q4h. Hx of HLD: continue home rosuvastatin; Repeat TTE  (07/18) - PASP 64mmHg, EF 65-70%%. Previously total body overloaded - now euvolemic  Pulm: Extubated 7/6 - now on RA, saturating well. s/p Chest tube by CT surg (6/27--7/3)   GI: NPO+sips. TPN/lipids, wound dehisense -- RTOR on 7/5 for exlap, washout, ileocolic resection with end ileostomy, blow hole colostomy; vicryl bridging mesh explanted and vac replaced 2 x week, vac cannisters changed q6h- Continue Octreotide and cholestyramine. Monitoring LFTs daily  : Voids.   ID: Blood Cx sent 7/23 - NGTD. Empiric coverage with imipenem ((7/23-7/30) -- ID signed off Dapto (7/23--7/24) IR Cx 7/3: C. tertium, Lactobacillis. OR Cx 7/5 - rare yeast - Imipenem (6/30-7/12), Fluconazole (6/30 -7/12) OR Cx 6/28: Vanc-resistant/amp-sens (but allergic) enterococcus s/p CTX (6/26-6/30), Flagyl (6/26-6/30), Dapto (6/30-7/5).  Endo: ISS; Hx of hypothyroid: cont synthroid. Gout: holding home allopurionol  PPx: SCD, HSQ.  Lines: PIV x 2, RUE PICC (6/30--), [place] (7/8-7/10) // DC: R Chest tube for iatrogenic PTX (6/27--7/3), R TLC (06/26-30). R TLC (7/5 -7/12)  Wounds/Drains: midline incision; wound vac changes q48h with fistula opening; R JOYCE below ileostomy (D/C 7/17), L JOYCE at small bowel enterotomy repair - to LIWS.  PT/OT: [facility]O  Dispo: SICU   78M w/ Crohn's, AFib/Flutter s/p DCCVs on amiodarone, remote ileocectomy and open appy here for SBO vs Crohns flare, s/p NGT decompression and now s/p lap TRE converted to open TRE, SBR x 3, left in discontinuity with abthera vac on 6/27, RTOR for ileocolic resection, small bowel anastomosis, and abdominal wall closure on 6/28, and s/p IR aspiration of perihepatic fluid on 7/3, stepped down to telemetery on 7/4. wound dehisence on 7/5, RTOR exlap, washout, ileocolic resection with end ileostomy, blow hole colostomy, red rubber from ileostomy to small bowel anastomosis; vicryl bridging mesh on 7/5. Transferred to SICU post op for HD monitoring. Extubated 7/6.    Neuro: Pain well controlled. Hx anxiety: Continue home venlafaxine  CV: MAP > 65. Hx of Afib/flutter: s/p DCCV, on PO amiodarone, metoprolol. Hx of HTN- continue home losartan, new amlodipine 10 qd. IV Hydralazine PRN for SBP < 140 q4h. Hx of HLD: continue home rosuvastatin; Repeat TTE  (07/18) - PASP 64mmHg, EF 65-70%%. Previously total body overloaded - now euvolemic  Pulm: Extubated 7/6 - now on RA, saturating well. s/p Chest tube by CT surg (6/27--7/3)   GI: Continue NPO+sips, output is lower. TPN/lipids, wound dehisense -- RTOR on 7/5 for exlap, washout, ileocolic resection with end ileostomy, blow hole colostomy; vicryl bridging mesh explanted and vac replaced 2 x week, vac cannisters changed q6h- Continue Octreotide and cholestyramine. Monitoring LFTs daily  : Voids.   ID: Blood Cx sent 7/23 - NGTD. Empiric coverage with imipenem ((7/23-7/30) -- ID signed off Dapto (7/23--7/24) IR Cx 7/3: C. tertium, Lactobacillis. OR Cx 7/5 - rare yeast - Imipenem (6/30-7/12), Fluconazole (6/30 -7/12) OR Cx 6/28: Vanc-resistant/amp-sens (but allergic) enterococcus s/p CTX (6/26-6/30), Flagyl (6/26-6/30), Dapto (6/30-7/5).  Endo: ISS; Hx of hypothyroid: cont synthroid. Gout: holding home allopurionol  PPx: SCD, HSQ.  Lines: PIV x 2, RUE PICC (6/30--), [place] (7/8-7/10) // DC: R Chest tube for iatrogenic PTX (6/27--7/3), R TLC (06/26-30). R TLC (7/5 -7/12)  Wounds/Drains: midline incision; wound vac changes q48h with fistula opening; R JOYCE below ileostomy (D/C 7/17), L JOYCE at small bowel enterotomy repair - to LIWS.  PT/OT: [facility]  Dispo: SICU

## 2023-08-01 NOTE — PROGRESS NOTE ADULT - SUBJECTIVE AND OBJECTIVE BOX
Pt seen and examined   out of bed to chair  no complaints    REVIEW OF SYSTEMS:  Constitutional: No fever   Cardiovascular: No chest pain, palpitations, dizziness    Gastrointestinal: No abdominal or epigastric pain. No nausea, vomiting    Skin: No itching, burning, rashes or lesions       MEDICATIONS:  MEDICATIONS  (STANDING):  aMIOdarone    Tablet 200 milliGRAM(s) Oral daily  amLODIPine   Tablet 10 milliGRAM(s) Oral daily  atorvastatin 20 milliGRAM(s) Oral at bedtime  chlorhexidine 2% Cloths 1 Application(s) Topical <User Schedule>  cholestyramine Powder (Sugar-Free) 4 Gram(s) Oral two times a day  dextrose 5%. 1000 milliLiter(s) (50 mL/Hr) IV Continuous <Continuous>  dextrose 5%. 1000 milliLiter(s) (100 mL/Hr) IV Continuous <Continuous>  dextrose 50% Injectable 25 Gram(s) IV Push once  dextrose 50% Injectable 12.5 Gram(s) IV Push once  dextrose 50% Injectable 25 Gram(s) IV Push once  glucagon  Injectable 1 milliGRAM(s) IntraMuscular once  heparin   Injectable 5000 Unit(s) SubCutaneous every 8 hours  insulin lispro (ADMELOG) corrective regimen sliding scale   SubCutaneous every 6 hours  levothyroxine 50 MICROGram(s) Oral daily  lipid, fat emulsion (Fish Oil and Plant Based) 20% Infusion 0.5 Gm/kG/Day (20.83 mL/Hr) IV Continuous <Continuous>  losartan 25 milliGRAM(s) Oral daily  metoprolol tartrate 25 milliGRAM(s) Oral every 12 hours  nystatin Powder 1 Application(s) Topical three times a day  octreotide  Injectable 100 MICROGram(s) IV Push every 8 hours  pantoprazole  Injectable 40 milliGRAM(s) IV Push daily  Parenteral Nutrition - Adult 1 Each (96 mL/Hr) TPN Continuous <Continuous>  Parenteral Nutrition - Adult 1 Each (96 mL/Hr) TPN Continuous <Continuous>  venlafaxine XR. 300 milliGRAM(s) Oral daily    MEDICATIONS  (PRN):  benzocaine/menthol Lozenge 2 Lozenge Oral every 4 hours PRN Sore Throat  dextrose Oral Gel 15 Gram(s) Oral once PRN Blood Glucose LESS THAN 70 milliGRAM(s)/deciliter  hydrALAZINE Injectable 10 milliGRAM(s) IV Push every 4 hours PRN SBP > 140  melatonin 5 milliGRAM(s) Oral at bedtime PRN Sleep      Allergies    penicillin (Angioedema)    Intolerances        Vital Signs Last 24 Hrs  T(C): 36.6 (01 Aug 2023 10:00), Max: 36.9 (01 Aug 2023 00:08)  T(F): 97.8 (01 Aug 2023 10:00), Max: 98.4 (01 Aug 2023 00:08)  HR: 87 (01 Aug 2023 10:00) (80 - 87)  BP: 114/58 (01 Aug 2023 10:00) (104/70 - 151/67)  BP(mean): 80 (01 Aug 2023 10:00) (79 - 106)  RR: 21 (01 Aug 2023 10:00) (20 - 34)  SpO2: 95% (01 Aug 2023 10:00) (94% - 98%)    Parameters below as of 01 Aug 2023 11:00  Patient On (Oxygen Delivery Method): room air        07-31 @ 07:01 - 08-01 @ 07:00  --------------------------------------------------------  IN: 2718 mL / OUT: 2945 mL / NET: -227 mL    08-01 @ 07:01 - 08-01 @ 11:34  --------------------------------------------------------  IN: 384 mL / OUT: 525 mL / NET: -141 mL        PHYSICAL EXAM:    General:  in no acute distress  HEENT: MMM, conjunctiva and sclera clear  Gastrointestinal:   non-distended; ileostomy with good output, vac  Skin: Warm and dry. No obvious rash    LABS:      CBC Full  -  ( 01 Aug 2023 06:20 )  WBC Count : 12.93 K/uL  RBC Count : 2.85 M/uL  Hemoglobin : 8.7 g/dL  Hematocrit : 27.3 %  Platelet Count - Automated : 336 K/uL  Mean Cell Volume : 95.8 fl  Mean Cell Hemoglobin : 30.5 pg  Mean Cell Hemoglobin Concentration : 31.9 gm/dL  Auto Neutrophil # : x  Auto Lymphocyte # : x  Auto Monocyte # : x  Auto Eosinophil # : x  Auto Basophil # : x  Auto Neutrophil % : x  Auto Lymphocyte % : x  Auto Monocyte % : x  Auto Eosinophil % : x  Auto Basophil % : x    08-01    138  |  105  |  23  ----------------------------<  96  4.0   |  27  |  0.71    Ca    7.6<L>      01 Aug 2023 06:20  Phos  3.1     08-01  Mg     2.0     08-01    TPro  7.4  /  Alb  2.2<L>  /  TBili  3.6<H>  /  DBili  x   /  AST  125<H>  /  ALT  98<H>  /  AlkPhos  177<H>  08-01          Urinalysis Basic - ( 01 Aug 2023 06:20 )    Color: x / Appearance: x / SG: x / pH: x  Gluc: 96 mg/dL / Ketone: x  / Bili: x / Urobili: x   Blood: x / Protein: x / Nitrite: x   Leuk Esterase: x / RBC: x / WBC x   Sq Epi: x / Non Sq Epi: x / Bacteria: x                RADIOLOGY & ADDITIONAL STUDIES (The following images were personally reviewed):

## 2023-08-01 NOTE — PROGRESS NOTE ADULT - SUBJECTIVE AND OBJECTIVE BOX
Outputs down with fluid restriction  AV SS  Wound manager intact  White count down  Overall feels well  Off antibiotics  On TPN  Okay to transfer to step down

## 2023-08-01 NOTE — PROGRESS NOTE ADULT - ASSESSMENT
78M w/ Crohn's, AFib/Flutter s/p DCCVs on amiodarone, remote ileocectomy and open appy here for SBO vs Crohns flare, s/p NGT decompression and now s/p lap TRE converted to open TRE, SBR x 3, left in discontinuity with abthera vac on 6/27, RTOR for ileocolic resection, small bowel anastomosis, and abdominal wall closure on 6/28, and s/p IR aspiration of perihepatic fluid on 7/3, stepped down to telemetery on 7/4. wound dehisence on 7/5, RTOR exlap, washout, ileocolic resection with end ileostomy, blow hole colostomy, red rubber from ileostomy to small bowel anastomosis; vicryl bridging mesh on 7/5. Transferred to SICU post op for HD monitoring. Extubated 7/6. Now w/ wound vac changes x2-3 weekly.     Recommendations:  - C/w abdominal wound care  - Wound vac to be changed Wednesday vs Thursday  - SDU today  Plan discussed with chief resident and attending surgeon, Dr. Peterson.

## 2023-08-01 NOTE — PROGRESS NOTE ADULT - SUBJECTIVE AND OBJECTIVE BOX
Interval Events:  Patient seen and examined at bedside.      Allergies    penicillin (Angioedema)    Intolerances        Vital Signs Last 24 Hrs  T(C): 36.9 (01 Aug 2023 00:08), Max: 36.9 (01 Aug 2023 00:08)  T(F): 98.4 (01 Aug 2023 00:08), Max: 98.4 (01 Aug 2023 00:08)  HR: 83 (01 Aug 2023 06:00) (80 - 87)  BP: 149/70 (01 Aug 2023 06:00) (104/70 - 152/68)  BP(mean): 100 (01 Aug 2023 06:00) (79 - 106)  RR: 26 (01 Aug 2023 06:00) (20 - 34)  SpO2: 94% (01 Aug 2023 06:00) (94% - 98%)    Parameters below as of 01 Aug 2023 04:00  Patient On (Oxygen Delivery Method): room air        07-30 @ 07:01  -  07-31 @ 07:00  --------------------------------------------------------  IN: 3167 mL / OUT: 3590 mL / NET: -423 mL    07-31 @ 07:01 - 08-01 @ 06:42  --------------------------------------------------------  IN: 2367 mL / OUT: 2595 mL / NET: -228 mL      07-30 @ 07:01 - 07-31 @ 07:00  --------------------------------------------------------  IN: 3167 mL / OUT: 3590 mL / NET: -423 mL    07-31 @ 07:01 - 08-01 @ 06:42  --------------------------------------------------------  IN: 2367 mL / OUT: 2595 mL / NET: -228 mL        Physical Exam:     Gen: NAD well nourished  Neuro: A&OX3 No deficits  CV:RRR Reg s1s2 noM  Pulm: CTA b/l No w/r/r  Abd: Soft NT ND + BS  Ext: No C/C/E   Vasc: + DP b/l   Skin: no rashes noted  MSK: No joint swelling  Psych: No signs of anxiety or depression      LABS:      CBC Full  -  ( 31 Jul 2023 07:19 )  WBC Count : 14.42 K/uL  RBC Count : 3.02 M/uL  Hemoglobin : 9.1 g/dL  Hematocrit : 29.0 %  Platelet Count - Automated : 347 K/uL  Mean Cell Volume : 96.0 fl  Mean Cell Hemoglobin : 30.1 pg  Mean Cell Hemoglobin Concentration : 31.4 gm/dL  Auto Neutrophil # : x  Auto Lymphocyte # : x  Auto Monocyte # : x  Auto Eosinophil # : x  Auto Basophil # : x  Auto Neutrophil % : x  Auto Lymphocyte % : x  Auto Monocyte % : x  Auto Eosinophil % : x  Auto Basophil % : x    07-31    134<L>  |  101  |  20  ----------------------------<  91  4.2   |  25  |  0.77    Ca    7.7<L>      31 Jul 2023 07:19  Phos  3.3     07-31  Mg     2.0     07-31    TPro  7.3  /  Alb  2.1<L>  /  TBili  3.6<H>  /  DBili  x   /  AST  140<H>  /  ALT  99<H>  /  AlkPhos  177<H>  07-31          Urinalysis Basic - ( 31 Jul 2023 07:19 )    Color: x / Appearance: x / SG: x / pH: x  Gluc: 91 mg/dL / Ketone: x  / Bili: x / Urobili: x   Blood: x / Protein: x / Nitrite: x   Leuk Esterase: x / RBC: x / WBC x   Sq Epi: x / Non Sq Epi: x / Bacteria: x              RADIOLOGY & ADDITIONAL STUDIES (The following images were personally reviewed):          A/p: 78yMale Interval Events: No acute events overnight    Patient seen and examined at bedside. ROS negative. Patient is sitting up in the chair, resting comfortably       Allergies    penicillin (Angioedema)    Intolerances        Vital Signs Last 24 Hrs  T(C): 36.9 (01 Aug 2023 00:08), Max: 36.9 (01 Aug 2023 00:08)  T(F): 98.4 (01 Aug 2023 00:08), Max: 98.4 (01 Aug 2023 00:08)  HR: 83 (01 Aug 2023 06:00) (80 - 87)  BP: 149/70 (01 Aug 2023 06:00) (104/70 - 152/68)  BP(mean): 100 (01 Aug 2023 06:00) (79 - 106)  RR: 26 (01 Aug 2023 06:00) (20 - 34)  SpO2: 94% (01 Aug 2023 06:00) (94% - 98%)    Parameters below as of 01 Aug 2023 04:00  Patient On (Oxygen Delivery Method): room air        07-30 @ 07:01  -  07-31 @ 07:00  --------------------------------------------------------  IN: 3167 mL / OUT: 3590 mL / NET: -423 mL    07-31 @ 07:01 - 08-01 @ 06:42  --------------------------------------------------------  IN: 2367 mL / OUT: 2595 mL / NET: -228 mL      07-30 @ 07:01 - 07-31 @ 07:00  --------------------------------------------------------  IN: 3167 mL / OUT: 3590 mL / NET: -423 mL    07-31 @ 07:01 - 08-01 @ 06:42  --------------------------------------------------------  IN: 2367 mL / OUT: 2595 mL / NET: -228 mL        Physical Exam:     Gen: No acute distress   Neuro: A&OX3 No deficits  CV: Regular rate and rhythm, no murmurs or rubs, no peripheral edema   Pulm: Lung sounds clear bilaterally   Abd: Soft, non-tender, wound vac in place   Ext: Warm, well-perfused   Vasc: +2 radial/pedal pulses bilaterally   Skin: no rashes noted  MSK: No joint swelling  Psych: No signs of anxiety or depression      LABS:      CBC Full  -  ( 31 Jul 2023 07:19 )  WBC Count : 14.42 K/uL  RBC Count : 3.02 M/uL  Hemoglobin : 9.1 g/dL  Hematocrit : 29.0 %  Platelet Count - Automated : 347 K/uL  Mean Cell Volume : 96.0 fl  Mean Cell Hemoglobin : 30.1 pg  Mean Cell Hemoglobin Concentration : 31.4 gm/dL      07-31    134<L>  |  101  |  20  ----------------------------<  91  4.2   |  25  |  0.77    Ca    7.7<L>      31 Jul 2023 07:19  Phos  3.3     07-31  Mg     2.0     07-31    TPro  7.3  /  Alb  2.1<L>  /  TBili  3.6<H>  /  DBili  x   /  AST  140<H>  /  ALT  99<H>  /  AlkPhos  177<H>  07-31          Urinalysis Basic - ( 31 Jul 2023 07:19 )    Color: x / Appearance: x / SG: x / pH: x  Gluc: 91 mg/dL / Ketone: x  / Bili: x / Urobili: x   Blood: x / Protein: x / Nitrite: x   Leuk Esterase: x / RBC: x / WBC x   Sq Epi: x / Non Sq Epi: x / Bacteria: x

## 2023-08-01 NOTE — PROGRESS NOTE ADULT - SUBJECTIVE AND OBJECTIVE BOX
SUBJECTIVE:  Patient seen and examined at bedside this AM, POD 28 s/p RTOR exlap, ileocolic with end ileostomy, washout, blow hole colostomy, fistula, red rubber from ileostomy to small bowel anastomosis   No acute overnight events   Small amount of leakage appreciated around EC fistula drain bag site this AM   Subjectively well, without any complaints or concerns   Voiding spontaneously   Minimal ileostomy output       MEDICATIONS  (STANDING):  aMIOdarone    Tablet 200 milliGRAM(s) Oral daily  amLODIPine   Tablet 10 milliGRAM(s) Oral daily  atorvastatin 20 milliGRAM(s) Oral at bedtime  chlorhexidine 2% Cloths 1 Application(s) Topical <User Schedule>  cholestyramine Powder (Sugar-Free) 4 Gram(s) Oral two times a day  dextrose 5%. 1000 milliLiter(s) (100 mL/Hr) IV Continuous <Continuous>  dextrose 5%. 1000 milliLiter(s) (50 mL/Hr) IV Continuous <Continuous>  dextrose 50% Injectable 25 Gram(s) IV Push once  dextrose 50% Injectable 12.5 Gram(s) IV Push once  dextrose 50% Injectable 25 Gram(s) IV Push once  glucagon  Injectable 1 milliGRAM(s) IntraMuscular once  heparin   Injectable 5000 Unit(s) SubCutaneous every 8 hours  insulin lispro (ADMELOG) corrective regimen sliding scale   SubCutaneous every 6 hours  levothyroxine 50 MICROGram(s) Oral daily  lipid, fat emulsion (Fish Oil and Plant Based) 20% Infusion 0.5 Gm/kG/Day (20.83 mL/Hr) IV Continuous <Continuous>  losartan 25 milliGRAM(s) Oral daily  metoprolol tartrate 25 milliGRAM(s) Oral every 12 hours  nystatin Powder 1 Application(s) Topical three times a day  octreotide  Injectable 100 MICROGram(s) IV Push every 8 hours  pantoprazole  Injectable 40 milliGRAM(s) IV Push daily  Parenteral Nutrition - Adult 1 Each (96 mL/Hr) TPN Continuous <Continuous>  Parenteral Nutrition - Adult 1 Each (96 mL/Hr) TPN Continuous <Continuous>  venlafaxine XR. 300 milliGRAM(s) Oral daily    MEDICATIONS  (PRN):  benzocaine/menthol Lozenge 2 Lozenge Oral every 4 hours PRN Sore Throat  dextrose Oral Gel 15 Gram(s) Oral once PRN Blood Glucose LESS THAN 70 milliGRAM(s)/deciliter  hydrALAZINE Injectable 10 milliGRAM(s) IV Push every 4 hours PRN SBP > 140  melatonin 5 milliGRAM(s) Oral at bedtime PRN Sleep      Vital Signs Last 24 Hrs  T(C): 36.6 (01 Aug 2023 13:00), Max: 36.9 (01 Aug 2023 00:08)  T(F): 97.8 (01 Aug 2023 13:00), Max: 98.4 (01 Aug 2023 00:08)  HR: 83 (01 Aug 2023 14:00) (80 - 87)  BP: 127/64 (01 Aug 2023 14:00) (104/70 - 151/67)  BP(mean): 91 (01 Aug 2023 14:00) (80 - 106)  RR: 30 (01 Aug 2023 14:00) (20 - 34)  SpO2: 95% (01 Aug 2023 14:00) (94% - 98%)    Parameters below as of 01 Aug 2023 15:00  Patient On (Oxygen Delivery Method): room air        Physical Exam:  General: NAD, resting comfortably in bed  Pulmonary: Nonlabored breathing, no respiratory distress in room air; 1000 mL on IS  Cardiovascular: NSR  Abdominal: soft, vac with visible suction with red rubber drain, JOYCE drain bilious left abdomen   Extremities: WWP, normal strength  Neuro: A/O x 3, CNs II-XII grossly intact, no focal deficits, normal motor/sensation      I&O's Summary    31 Jul 2023 07:01  -  01 Aug 2023 07:00  --------------------------------------------------------  IN: 2718 mL / OUT: 2945 mL / NET: -227 mL    01 Aug 2023 07:01  -  01 Aug 2023 14:16  --------------------------------------------------------  IN: 576 mL / OUT: 800 mL / NET: -224 mL        LABS:                        8.7    12.93 )-----------( 336      ( 01 Aug 2023 06:20 )             27.3     08-01    138  |  105  |  23  ----------------------------<  96  4.0   |  27  |  0.71    Ca    7.6<L>      01 Aug 2023 06:20  Phos  3.1     08-01  Mg     2.0     08-01    TPro  7.4  /  Alb  2.2<L>  /  TBili  3.6<H>  /  DBili  x   /  AST  125<H>  /  ALT  98<H>  /  AlkPhos  177<H>  08-01      Urinalysis Basic - ( 01 Aug 2023 06:20 )    Color: x / Appearance: x / SG: x / pH: x  Gluc: 96 mg/dL / Ketone: x  / Bili: x / Urobili: x   Blood: x / Protein: x / Nitrite: x   Leuk Esterase: x / RBC: x / WBC x   Sq Epi: x / Non Sq Epi: x / Bacteria: x      CAPILLARY BLOOD GLUCOSE      POCT Blood Glucose.: 121 mg/dL (01 Aug 2023 12:12)  POCT Blood Glucose.: 120 mg/dL (01 Aug 2023 00:20)  POCT Blood Glucose.: 121 mg/dL (31 Jul 2023 17:09)    LIVER FUNCTIONS - ( 01 Aug 2023 06:20 )  Alb: 2.2 g/dL / Pro: 7.4 g/dL / ALK PHOS: 177 U/L / ALT: 98 U/L / AST: 125 U/L / GGT: x             RADIOLOGY & ADDITIONAL STUDIES:

## 2023-08-01 NOTE — PROGRESS NOTE ADULT - SUBJECTIVE AND OBJECTIVE BOX
INTERVAL HPI/OVERNIGHT EVENTS:  AMRITA overnight.     POST OPERATIVE DAY:  6/27: Laparoscopic lysis of adhesions, converted to open lysis of adhesions, SBR x 3, temporary abdominal closure, 5L cryst, 1L 5% alb, 3 pRBC, 1 FFP, 1 plts  6/28: ex lap, removal of abthera, ileocolic resection, small bowel anastamosis, , 1.6 crystalloid, 250 5%, 175 uop   7/3: IR Gendel drained perihepatic aspiration of serous fluid.   7/5: RTOR exlap, washout, ileocolic resection with end ileostomy, blow hole colostomy, fistula, red rubber from ileostomy to small bowel anastomosis; vicryl bridging mesh; R JOYCE below ileostomy, L JOYCE at small bowel enterotomy repair; 500 LR, 500 5% albumin, 3u PRBC, 2 FFP, 400 UOP,     SUBJECTIVE:  Patient seen and examined by chief resident and team on AM rounds. Patient reports he is comfortable without any acute complaints. Patient is currently NPO w/ sips, without any nausea or vomiting. Denies any sob/cp. Patients wound vac was with small leak, patched w/ tegaderm w/ resolution. VS wnl overnight, WBC downtrending 12 (14), hb and LFTs are lateral.     MEDICATIONS  (STANDING):  aMIOdarone    Tablet 200 milliGRAM(s) Oral daily  amLODIPine   Tablet 10 milliGRAM(s) Oral daily  atorvastatin 20 milliGRAM(s) Oral at bedtime  chlorhexidine 2% Cloths 1 Application(s) Topical <User Schedule>  cholestyramine Powder (Sugar-Free) 4 Gram(s) Oral two times a day  dextrose 5%. 1000 milliLiter(s) (100 mL/Hr) IV Continuous <Continuous>  dextrose 5%. 1000 milliLiter(s) (50 mL/Hr) IV Continuous <Continuous>  dextrose 50% Injectable 25 Gram(s) IV Push once  dextrose 50% Injectable 12.5 Gram(s) IV Push once  dextrose 50% Injectable 25 Gram(s) IV Push once  glucagon  Injectable 1 milliGRAM(s) IntraMuscular once  heparin   Injectable 5000 Unit(s) SubCutaneous every 8 hours  insulin lispro (ADMELOG) corrective regimen sliding scale   SubCutaneous every 6 hours  levothyroxine 50 MICROGram(s) Oral daily  lipid, fat emulsion (Fish Oil and Plant Based) 20% Infusion 0.5 Gm/kG/Day (20.83 mL/Hr) IV Continuous <Continuous>  losartan 25 milliGRAM(s) Oral daily  metoprolol tartrate 25 milliGRAM(s) Oral every 12 hours  nystatin Powder 1 Application(s) Topical three times a day  octreotide  Injectable 100 MICROGram(s) IV Push every 8 hours  pantoprazole  Injectable 40 milliGRAM(s) IV Push daily  Parenteral Nutrition - Adult 1 Each (96 mL/Hr) TPN Continuous <Continuous>  Parenteral Nutrition - Adult 1 Each (96 mL/Hr) TPN Continuous <Continuous>  venlafaxine XR. 300 milliGRAM(s) Oral daily    MEDICATIONS  (PRN):  benzocaine/menthol Lozenge 2 Lozenge Oral every 4 hours PRN Sore Throat  dextrose Oral Gel 15 Gram(s) Oral once PRN Blood Glucose LESS THAN 70 milliGRAM(s)/deciliter  hydrALAZINE Injectable 10 milliGRAM(s) IV Push every 4 hours PRN SBP > 140  melatonin 5 milliGRAM(s) Oral at bedtime PRN Sleep      Vital Signs Last 24 Hrs  T(C): 36.6 (01 Aug 2023 10:00), Max: 36.9 (01 Aug 2023 00:08)  T(F): 97.8 (01 Aug 2023 10:00), Max: 98.4 (01 Aug 2023 00:08)  HR: 85 (01 Aug 2023 11:00) (80 - 87)  BP: 118/60 (01 Aug 2023 11:00) (104/70 - 151/67)  BP(mean): 83 (01 Aug 2023 11:00) (80 - 106)  RR: 30 (01 Aug 2023 11:00) (20 - 34)  SpO2: 95% (01 Aug 2023 11:00) (94% - 98%)    Parameters below as of 01 Aug 2023 11:00  Patient On (Oxygen Delivery Method): room air        PHYSICAL EXAM:  General: Resting in bed, NAD, mild scleral icterus, mild jaundice.    HEENT: Mucous membranes are moist.   Pulmonary: CTAB, breathing comfortably on RA. No increased WOB.   Cardiovascular: RRR, no m/r/g  Abdominal: soft, NTND abdomen. L. abdominal JOYCE is bilious, placed to LIWS,  from wound vac with ostomy paste. Wound Vac in place to suction, with good seal, with green output. Ostomy in R abdomen with bilious output.  Extremities: WWP   Neuro: A/Ox3, CNs II-XII grossly intact, sensorimotor function grossly intact in all 4 extremities.  Pulses: palpable distal pulses      I&O's Detail    31 Jul 2023 07:01  -  01 Aug 2023 07:00  --------------------------------------------------------  IN:    Fat Emulsion (Fish Oil &amp; Plant Based) 20% Infusion: 294 mL    Oral Fluid: 120 mL    TPN (Total Parenteral Nutrition): 2304 mL  Total IN: 2718 mL    OUT:    Bulb (mL): 0 mL    Drain (mL): 0 mL    Drain (mL): 0 mL    Drain (mL): 500 mL    Ileostomy (mL): 70 mL    VAC (Vacuum Assisted Closure) System (mL): 200 mL    Voided (mL): 2175 mL  Total OUT: 2945 mL    Total NET: -227 mL      01 Aug 2023 07:01  -  01 Aug 2023 12:07  --------------------------------------------------------  IN:    TPN (Total Parenteral Nutrition): 480 mL  Total IN: 480 mL    OUT:    Bulb (mL): 0 mL    Drain (mL): 100 mL    Drain (mL): 0 mL    Drain (mL): 0 mL    Ileostomy (mL): 50 mL    VAC (Vacuum Assisted Closure) System (mL): 0 mL    Voided (mL): 650 mL  Total OUT: 800 mL    Total NET: -320 mL          LABS:                        8.7    12.93 )-----------( 336      ( 01 Aug 2023 06:20 )             27.3     08-01    138  |  105  |  23  ----------------------------<  96  4.0   |  27  |  0.71    Ca    7.6<L>      01 Aug 2023 06:20  Phos  3.1     08-01  Mg     2.0     08-01    TPro  7.4  /  Alb  2.2<L>  /  TBili  3.6<H>  /  DBili  x   /  AST  125<H>  /  ALT  98<H>  /  AlkPhos  177<H>  08-01      Urinalysis Basic - ( 01 Aug 2023 06:20 )    Color: x / Appearance: x / SG: x / pH: x  Gluc: 96 mg/dL / Ketone: x  / Bili: x / Urobili: x   Blood: x / Protein: x / Nitrite: x   Leuk Esterase: x / RBC: x / WBC x   Sq Epi: x / Non Sq Epi: x / Bacteria: x        RADIOLOGY & ADDITIONAL STUDIES:

## 2023-08-02 NOTE — PROGRESS NOTE ADULT - ASSESSMENT
78M w/ Crohn's, AFib/Flutter s/p DCCVs on amiodarone, remote ileocectomy and open appy here for SBO vs Crohns flare, s/p NGT decompression and now s/p lap TRE converted to open TRE, SBR x 3, left in discontinuity with abthera vac on 6/27, RTOR for ileocolic resection, small bowel anastomosis, and abdominal wall closure on 6/28, and s/p IR aspiration of perihepatic fluid on 7/3, stepped down to telemetery on 7/4. wound dehisence on 7/5, RTOR exlap, washout, ileocolic resection with end ileostomy, blow hole colostomy, red rubber from ileostomy to small bowel anastomosis; vicryl bridging mesh on 7/5. Transferred to SICU post op for HD monitoring. Extubated 7/6.    Plan:   - PRN pain control   - Hx of A-fib/flutter; continue rate and rhythm control   - Encourage IS/OOB   - NPO w/Sips & chips; continue TPN   - Wound vac change tomorrow   - Appreciate ostomy appliance, wound manager and wound vac care per wound care team and RN   - Improving leukocytosis off Abx; continue Nystatin topical   - DVT mechanical and chemo prophylaxis   - Continued care per SICU team        Plan discussed with chief resident and attending, Dr. Knapp 78M w/ Crohn's, AFib/Flutter s/p DCCVs on amiodarone, remote ileocectomy and open appy here for SBO vs Crohns flare, s/p NGT decompression and now s/p lap TRE converted to open TRE, SBR x 3, left in discontinuity with abthera vac on 6/27, RTOR for ileocolic resection, small bowel anastomosis, and abdominal wall closure on 6/28, and s/p IR aspiration of perihepatic fluid on 7/3, stepped down to telemetry on 7/4. wound dehiscence on 7/5, RTOR ex-lap, washout, ileocolic resection with end ileostomy, blow hole colostomy, red rubber from ileostomy to small bowel anastomosis; vicryl bridging mesh on 7/5. Transferred to SICU post op for HD monitoring. Extubated 7/6.    Plan:   - PRN pain control   - Hx of A-fib/flutter; continue rate and rhythm control   - Encourage IS/OOB   - NPO w/Sips & chips; continue TPN   - Wound vac change today   - Monitor fistula drain output (downtrending)  - Appreciate ostomy appliance/wound manager/wound vac care per wound care team and RN   - Monitor WBC curve. Topical Nystatin   - DVT mechanical and chemo prophylaxis   - Continued care per SICU team        Plan discussed with chief resident and attending, Dr. Knapp

## 2023-08-02 NOTE — PROGRESS NOTE ADULT - SUBJECTIVE AND OBJECTIVE BOX
Interval Events:  Patient seen and examined at bedside.      Allergies    penicillin (Angioedema)    Intolerances        Vital Signs Last 24 Hrs  T(C): 36.6 (02 Aug 2023 00:56), Max: 36.7 (01 Aug 2023 18:08)  T(F): 97.8 (02 Aug 2023 00:56), Max: 98.1 (01 Aug 2023 18:08)  HR: 83 (02 Aug 2023 04:00) (81 - 87)  BP: 157/73 (02 Aug 2023 04:00) (112/59 - 157/73)  BP(mean): 105 (02 Aug 2023 04:00) (78 - 105)  RR: 23 (02 Aug 2023 04:00) (20 - 35)  SpO2: 98% (02 Aug 2023 04:00) (95% - 98%)    Parameters below as of 02 Aug 2023 04:00  Patient On (Oxygen Delivery Method): room air        07-31 @ 07:01  -  08-01 @ 07:00  --------------------------------------------------------  IN: 2718 mL / OUT: 2945 mL / NET: -227 mL    08-01 @ 07:01 - 08-02 @ 06:23  --------------------------------------------------------  IN: 2475 mL / OUT: 2920 mL / NET: -445 mL      07-31 @ 07:01  -  08-01 @ 07:00  --------------------------------------------------------  IN: 2718 mL / OUT: 2945 mL / NET: -227 mL    08-01 @ 07:01 - 08-02 @ 06:23  --------------------------------------------------------  IN: 2475 mL / OUT: 2920 mL / NET: -445 mL        Physical Exam:     Gen: NAD well nourished  Neuro: A&OX3 No deficits  CV:RRR Reg s1s2 noM  Pulm: CTA b/l No w/r/r  Abd: Soft NT ND + BS  Ext: No C/C/E   Vasc: + DP b/l   Skin: no rashes noted  MSK: No joint swelling  Psych: No signs of anxiety or depression      LABS:      CBC Full  -  ( 01 Aug 2023 06:20 )  WBC Count : 12.93 K/uL  RBC Count : 2.85 M/uL  Hemoglobin : 8.7 g/dL  Hematocrit : 27.3 %  Platelet Count - Automated : 336 K/uL  Mean Cell Volume : 95.8 fl  Mean Cell Hemoglobin : 30.5 pg  Mean Cell Hemoglobin Concentration : 31.9 gm/dL  Auto Neutrophil # : x  Auto Lymphocyte # : x  Auto Monocyte # : x  Auto Eosinophil # : x  Auto Basophil # : x  Auto Neutrophil % : x  Auto Lymphocyte % : x  Auto Monocyte % : x  Auto Eosinophil % : x  Auto Basophil % : x    08-01    138  |  105  |  23  ----------------------------<  96  4.0   |  27  |  0.71    Ca    7.6<L>      01 Aug 2023 06:20  Phos  3.1     08-01  Mg     2.0     08-01    TPro  7.4  /  Alb  2.2<L>  /  TBili  3.6<H>  /  DBili  x   /  AST  125<H>  /  ALT  98<H>  /  AlkPhos  177<H>  08-01          Urinalysis Basic - ( 01 Aug 2023 06:20 )    Color: x / Appearance: x / SG: x / pH: x  Gluc: 96 mg/dL / Ketone: x  / Bili: x / Urobili: x   Blood: x / Protein: x / Nitrite: x   Leuk Esterase: x / RBC: x / WBC x   Sq Epi: x / Non Sq Epi: x / Bacteria: x             Interval Events: No acute events overnight     Patient seen and examined at bedside. ROS negative. Sitting up in the chair.       Allergies    penicillin (Angioedema)    Intolerances        Vital Signs Last 24 Hrs  T(C): 36.6 (02 Aug 2023 00:56), Max: 36.7 (01 Aug 2023 18:08)  T(F): 97.8 (02 Aug 2023 00:56), Max: 98.1 (01 Aug 2023 18:08)  HR: 83 (02 Aug 2023 04:00) (81 - 87)  BP: 157/73 (02 Aug 2023 04:00) (112/59 - 157/73)  BP(mean): 105 (02 Aug 2023 04:00) (78 - 105)  RR: 23 (02 Aug 2023 04:00) (20 - 35)  SpO2: 98% (02 Aug 2023 04:00) (95% - 98%)    Parameters below as of 02 Aug 2023 04:00  Patient On (Oxygen Delivery Method): room air        07-31 @ 07:01  -  08-01 @ 07:00  --------------------------------------------------------  IN: 2718 mL / OUT: 2945 mL / NET: -227 mL    08-01 @ 07:01 - 08-02 @ 06:23  --------------------------------------------------------  IN: 2475 mL / OUT: 2920 mL / NET: -445 mL      07-31 @ 07:01 - 08-01 @ 07:00  --------------------------------------------------------  IN: 2718 mL / OUT: 2945 mL / NET: -227 mL    08-01 @ 07:01  -  08-02 @ 06:23  --------------------------------------------------------  IN: 2475 mL / OUT: 2920 mL / NET: -445 mL        Physical Exam:     Gen: No acute events   Neuro: A&OX3 No deficits  CV: Regular rate and rhythm, no murmurs or gallops, no peripheral edema   Pulm: Lung sounds clear bilaterally   Abd: Soft, non-tender, wound vac in place   Ext: Warm, well-perfused   Vasc: +2 radial/pedal pulses bilaterally   Skin: no rashes noted  MSK: No joint swelling  Psych: No signs of anxiety or depression      LABS:      CBC Full  -  ( 01 Aug 2023 06:20 )  WBC Count : 12.93 K/uL  RBC Count : 2.85 M/uL  Hemoglobin : 8.7 g/dL  Hematocrit : 27.3 %  Platelet Count - Automated : 336 K/uL  Mean Cell Volume : 95.8 fl  Mean Cell Hemoglobin : 30.5 pg  Mean Cell Hemoglobin Concentration : 31.9 gm/dL      08-01    138  |  105  |  23  ----------------------------<  96  4.0   |  27  |  0.71    Ca    7.6<L>      01 Aug 2023 06:20  Phos  3.1     08-01  Mg     2.0     08-01    TPro  7.4  /  Alb  2.2<L>  /  TBili  3.6<H>  /  DBili  x   /  AST  125<H>  /  ALT  98<H>  /  AlkPhos  177<H>  08-01          Urinalysis Basic - ( 01 Aug 2023 06:20 )    Color: x / Appearance: x / SG: x / pH: x  Gluc: 96 mg/dL / Ketone: x  / Bili: x / Urobili: x   Blood: x / Protein: x / Nitrite: x   Leuk Esterase: x / RBC: x / WBC x   Sq Epi: x / Non Sq Epi: x / Bacteria: x

## 2023-08-02 NOTE — PROGRESS NOTE ADULT - ASSESSMENT
78M w/ Crohn's, AFib/Flutter s/p DCCVs on amiodarone, remote ileocectomy and open appy here for SBO vs Crohns flare, s/p NGT decompression and now s/p lap TRE converted to open TRE, SBR x 3, left in discontinuity with abthera vac on 6/27, RTOR for ileocolic resection, small bowel anastomosis, and abdominal wall closure on 6/28, and s/p IR aspiration of perihepatic fluid on 7/3, stepped down to telemetery on 7/4. wound dehisence on 7/5, RTOR exlap, washout, ileocolic resection with end ileostomy, blow hole colostomy, red rubber from ileostomy to small bowel anastomosis; vicryl bridging mesh on 7/5. Transferred to SICU post op for HD monitoring. Extubated 7/6.    Neuro: Pain well controlled. Hx anxiety: Continue home venlafaxine  CV: MAP > 65. Hx of Afib/flutter: s/p DCCV, on PO amiodarone, metoprolol. Hx of HTN- continue home losartan, new amlodipine 10 qd. IV Hydralazine PRN for SBP < 140 q4h. Hx of HLD: continue home rosuvastatin; Repeat TTE  (07/18) - PASP 64mmHg, EF 65-70%%. Previously total body overloaded - now euvolemic  Pulm: Extubated 7/6 - now on RA, saturating well. s/p Chest tube by CT surg (6/27--7/3)   GI: NPO+sips. TPN/lipids, wound dehisense -- RTOR on 7/5 for exlap, washout, ileocolic resection with end ileostomy, blow hole colostomy; vicryl bridging mesh explanted and vac replaced 2 x week, vac cannisters changed q6h- Continue Octreotide and cholestyramine. Monitoring LFTs daily  : Voids.   ID: Blood Cx sent 7/23 - NGTD. Empiric coverage with imipenem ((7/23-7/30) -- ID signed off Dapto (7/23--7/24) IR Cx 7/3: C. tertium, Lactobacillis. OR Cx 7/5 - rare yeast - Imipenem (6/30-7/12), Fluconazole (6/30 -7/12) OR Cx 6/28: Vanc-resistant/amp-sens (but allergic) enterococcus s/p CTX (6/26-6/30), Flagyl (6/26-6/30), Dapto (6/30-7/5).  Endo: ISS; Hx of hypothyroid: cont synthroid. Gout: holding home allopurionol  PPx: SCD, HSQ.  Lines: PIV x 2, RUE PICC (6/30--), Victoria (7/8-7/10) // DC: R Chest tube for iatrogenic PTX (6/27--7/3), R TLC (06/26-30). R TLC (7/5 -7/12)  Wounds/Drains: midline incision; wound vac changes q48h with fistula opening; R JOYCE below ileostomy (D/C 7/17), L JOYCE at small bowel enterotomy repair - to LIWS.  PT/OT: CONRADO  Dispo: SICU   78M w/ Crohn's, AFib/Flutter s/p DCCVs on amiodarone, remote ileocectomy and open appy here for SBO vs Crohns flare, s/p NGT decompression and now s/p lap TRE converted to open TRE, SBR x 3, left in discontinuity with abthera vac on 6/27, RTOR for ileocolic resection, small bowel anastomosis, and abdominal wall closure on 6/28, and s/p IR aspiration of perihepatic fluid on 7/3, stepped down to telemetery on 7/4. wound dehisence on 7/5, RTOR exlap, washout, ileocolic resection with end ileostomy, blow hole colostomy, red rubber from ileostomy to small bowel anastomosis; vicryl bridging mesh on 7/5. Transferred to SICU post op for HD monitoring. Extubated 7/6.    Neuro: Pain well controlled. 0.5mg IVP Dilaudid given for Vac change. Hx anxiety: Continue home venlafaxine  CV: MAP > 65. Hx of Afib/flutter: s/p DCCV, on PO amiodarone, metoprolol. Hx of HTN- continue home losartan, new amlodipine 10 qd. IV Hydralazine PRN for SBP < 140 q4h. Hx of HLD: continue home rosuvastatin; Repeat TTE  (07/18) - PASP 64mmHg, EF 65-70%%. Previously total body overloaded - now euvolemic  Pulm: Extubated 7/6 - now on RA, saturating well. s/p Chest tube by CT surg (6/27--7/3)   GI: NPO+sips. ECF drainage continues to downtrend. TPN/lipids, wound dehisense -- RTOR on 7/5 for exlap, washout, ileocolic resection with end ileostomy, blow hole colostomy; vicryl bridging mesh explanted and vac replaced 2 x week, vac cannisters changed q6h- Continue Octreotide and cholestyramine. Monitoring LFTs daily  : Voids.   ID: Blood Cx sent 7/23 - NGTD. Empiric coverage with imipenem ((7/23-7/30) -- ID signed off Dapto (7/23--7/24) IR Cx 7/3: C. tertium, Lactobacillis. OR Cx 7/5 - rare yeast - Imipenem (6/30-7/12), Fluconazole (6/30 -7/12) OR Cx 6/28: Vanc-resistant/amp-sens (but allergic) enterococcus s/p CTX (6/26-6/30), Flagyl (6/26-6/30), Dapto (6/30-7/5).  Endo: ISS; Hx of hypothyroid: cont synthroid. Gout: holding home allopurinol  PPx: SCD, HSQ.  Lines: PIV x 2, RUE PICC (6/30--), Victoria (7/8-7/10) // DC: R Chest tube for iatrogenic PTX (6/27--7/3), R TLC (06/26-30). R TLC (7/5 -7/12)  Wounds/Drains: midline incision; wound vac changed today 8/2; R JOYCE below ileostomy (D/C 7/17), L JOYCE at small bowel enterotomy repair - to LICOURTNEY.  PT/OT: CONRADO  Dispo: SICU

## 2023-08-02 NOTE — CHART NOTE - NSCHARTNOTEFT_GEN_A_CORE
Admitting Diagnosis:   Patient is a 78y old  Male who presents with a chief complaint of sbo (30 Jul 2023 15:53)      PAST MEDICAL & SURGICAL HISTORY:  Essential hypertension  Hypertension      Atrial fibrillation  s/p cardioversion 2010 and 2014  Pt. reports 4 DCCV  Now on Amiodarone 200mg PO bid and Eliquis 5mg PO bid  Last DCCV 4 yrs ago at [facility]      Crohn's disease  s/p partial resection of ileum      Hyperlipidemia      Hypothyroidism      History of depression  On Venlafaxine ER 150mg PO bid      H/O knee surgery      History of cataract surgery          Current Nutrition Order:  TPN- 427g Dex + 161g AA + 50g SMOF lipids; provides 2595.8 kcals, 161g protein, GIR 2.9 mg/kg/min  NPO with sips/chips    PO Intake: Good (%) [   ]  Fair (50-75%) [   ] Poor (<25%) [   ]- N/A    GI Issues: wound vac in place, ostomy; noted with nausea, BM today    Pain: denies pain/discomfort    Skin Integrity: stage II PU to L buttock [healed], stage I PU to sacrum, midline wound vac, ileostomy, L JOYCE drain to suction, EC fistula with pouch    Labs:   08-02    137  |  103  |  22  ----------------------------<  89  4.2   |  25  |  0.74    Ca    7.6<L>      02 Aug 2023 05:30  Phos  3.4     08-02  Mg     1.9     08-02    TPro  7.6  /  Alb  2.1<L>  /  TBili  3.7<H>  /  DBili  2.6<H>  /  AST  129<H>  /  ALT  96<H>  /  AlkPhos  179<H>  08-02    CAPILLARY BLOOD GLUCOSE      POCT Blood Glucose.: 130 mg/dL (02 Aug 2023 12:46)  POCT Blood Glucose.: 121 mg/dL (01 Aug 2023 23:12)  POCT Blood Glucose.: 125 mg/dL (01 Aug 2023 18:43)      Medications:  MEDICATIONS  (STANDING):  aMIOdarone    Tablet 200 milliGRAM(s) Oral daily  amLODIPine   Tablet 10 milliGRAM(s) Oral daily  atorvastatin 20 milliGRAM(s) Oral at bedtime  chlorhexidine 2% Cloths 1 Application(s) Topical <User Schedule>  cholestyramine Powder (Sugar-Free) 4 Gram(s) Oral two times a day  dextrose 5%. 1000 milliLiter(s) (50 mL/Hr) IV Continuous <Continuous>  dextrose 5%. 1000 milliLiter(s) (100 mL/Hr) IV Continuous <Continuous>  dextrose 50% Injectable 25 Gram(s) IV Push once  dextrose 50% Injectable 12.5 Gram(s) IV Push once  dextrose 50% Injectable 25 Gram(s) IV Push once  glucagon  Injectable 1 milliGRAM(s) IntraMuscular once  heparin   Injectable 5000 Unit(s) SubCutaneous every 8 hours  insulin lispro (ADMELOG) corrective regimen sliding scale   SubCutaneous every 6 hours  levothyroxine 50 MICROGram(s) Oral daily  lipid, fat emulsion (Fish Oil and Plant Based) 20% Infusion 0.5 Gm/kG/Day (20.83 mL/Hr) IV Continuous <Continuous>  losartan 25 milliGRAM(s) Oral daily  metoprolol tartrate 25 milliGRAM(s) Oral every 12 hours  nystatin Powder 1 Application(s) Topical three times a day  octreotide  Injectable 100 MICROGram(s) IV Push every 8 hours  pantoprazole  Injectable 40 milliGRAM(s) IV Push daily  Parenteral Nutrition - Adult 1 Each (96 mL/Hr) TPN Continuous <Continuous>  Parenteral Nutrition - Adult 1 Each (96 mL/Hr) TPN Continuous <Continuous>  venlafaxine XR. 300 milliGRAM(s) Oral daily    MEDICATIONS  (PRN):  benzocaine/menthol Lozenge 2 Lozenge Oral every 4 hours PRN Sore Throat  dextrose Oral Gel 15 Gram(s) Oral once PRN Blood Glucose LESS THAN 70 milliGRAM(s)/deciliter  melatonin 5 milliGRAM(s) Oral at bedtime PRN Sleep      Weight: 101kg [5 July 2023]  Daily   99.9kg [9 July 2023]  Daily 102.1kg [10 July 2023]    Weight Change: weight gain ? r/t fluid shifts, edema. previously noted with trace generalized edema    IBW: 86.4kg   % IBW: 118.2% IBW    Estimated energy needs:   Ideal body weight (86.4kg) used for calculations as pt >100% of IBW, BMI <30 per [facility] Standards of Care. Needs estimated for age and adjusted for current clinical status, increased needs for post-op & open abd wound healing    Calories: 3173-7988 kcals based on 30-35 kcals/kg  Protein: 129.6-172.8 g protein based on 1.5-2g protein/kg  *Fluid needs per team    Subjective:   78M w/ Crohn's, AFib/Flutter s/p DCCVs on amiodarone, remote ileocectomy and open appy here for SBO vs Crohn’s flare, s/p NGT decompression and now s/p lap TRE converted to open TRE, SBR x 3, left in discontinuity with abthera vac on 6/27, RTOR for ileocolic resection, small bowel anastomosis, and abdominal wall closure on 6/28, and s/p IR aspiration of perihepatic fluid on 7/3, stepped down to telemetery on 7/4. wound dehiscence on 7/5, RTOR 7/5 for exlap, washout. Extubated 7/6. Continues on TPN and started on CLD on 7/12. Started on cholestyramine and octreotide. Seen by SLP on 7/14 and cleared for soft & bite sized diet as appropriate.    Chart reviewed. Pt seen today on 5 EA with IDT during AM rounds. Afebrile. HR WNL, BP WNL to low. MAPs >65 mmHg. I&Os x 24hrs: 2688 / 3300 = -612ml net.    Previous Nutrition Diagnosis:    Active [   ]  Resolved [   ]    If resolved, new PES:     Goal:  Pt will meet at least 75% of protein & energy needs via most appropriate route for nutrition     Recommendations:    Education:     Risk Level: High [   ] Moderate [   ] Low [   ] Admitting Diagnosis:   Patient is a 78y old  Male who presents with a chief complaint of sbo (30 Jul 2023 15:53)      PAST MEDICAL & SURGICAL HISTORY:  Essential hypertension  Hypertension      Atrial fibrillation  s/p cardioversion 2010 and 2014  Pt. reports 4 DCCV  Now on Amiodarone 200mg PO bid and Eliquis 5mg PO bid  Last DCCV 4 yrs ago at [facility]      Crohn's disease  s/p partial resection of ileum      Hyperlipidemia      Hypothyroidism      History of depression  On Venlafaxine ER 150mg PO bid      H/O knee surgery      History of cataract surgery          Current Nutrition Order:  TPN- 427g Dex + 161g AA + 50g SMOF lipids; provides 2595.8 kcals, 161g protein, GIR 2.9 mg/kg/min  NPO with sips/chips    PO Intake: Good (%) [   ]  Fair (50-75%) [   ] Poor (<25%) [   ]- N/A    GI Issues: wound vac in place, ostomy; noted with nausea, BM today    Pain: denies pain/discomfort    Skin Integrity: stage II PU to L buttock [healed], stage I PU to sacrum, midline wound vac, ileostomy, L JOYCE drain to suction, EC fistula with pouch    Labs:   08-02    137  |  103  |  22  ----------------------------<  89  4.2   |  25  |  0.74    Ca    7.6<L>      02 Aug 2023 05:30  Phos  3.4     08-02  Mg     1.9     08-02    TPro  7.6  /  Alb  2.1<L>  /  TBili  3.7<H>  /  DBili  2.6<H>  /  AST  129<H>  /  ALT  96<H>  /  AlkPhos  179<H>  08-02    CAPILLARY BLOOD GLUCOSE      POCT Blood Glucose.: 130 mg/dL (02 Aug 2023 12:46)  POCT Blood Glucose.: 121 mg/dL (01 Aug 2023 23:12)  POCT Blood Glucose.: 125 mg/dL (01 Aug 2023 18:43)      Medications:  MEDICATIONS  (STANDING):  aMIOdarone    Tablet 200 milliGRAM(s) Oral daily  amLODIPine   Tablet 10 milliGRAM(s) Oral daily  atorvastatin 20 milliGRAM(s) Oral at bedtime  chlorhexidine 2% Cloths 1 Application(s) Topical <User Schedule>  cholestyramine Powder (Sugar-Free) 4 Gram(s) Oral two times a day  dextrose 5%. 1000 milliLiter(s) (50 mL/Hr) IV Continuous <Continuous>  dextrose 5%. 1000 milliLiter(s) (100 mL/Hr) IV Continuous <Continuous>  dextrose 50% Injectable 25 Gram(s) IV Push once  dextrose 50% Injectable 12.5 Gram(s) IV Push once  dextrose 50% Injectable 25 Gram(s) IV Push once  glucagon  Injectable 1 milliGRAM(s) IntraMuscular once  heparin   Injectable 5000 Unit(s) SubCutaneous every 8 hours  insulin lispro (ADMELOG) corrective regimen sliding scale   SubCutaneous every 6 hours  levothyroxine 50 MICROGram(s) Oral daily  lipid, fat emulsion (Fish Oil and Plant Based) 20% Infusion 0.5 Gm/kG/Day (20.83 mL/Hr) IV Continuous <Continuous>  losartan 25 milliGRAM(s) Oral daily  metoprolol tartrate 25 milliGRAM(s) Oral every 12 hours  nystatin Powder 1 Application(s) Topical three times a day  octreotide  Injectable 100 MICROGram(s) IV Push every 8 hours  pantoprazole  Injectable 40 milliGRAM(s) IV Push daily  Parenteral Nutrition - Adult 1 Each (96 mL/Hr) TPN Continuous <Continuous>  Parenteral Nutrition - Adult 1 Each (96 mL/Hr) TPN Continuous <Continuous>  venlafaxine XR. 300 milliGRAM(s) Oral daily    MEDICATIONS  (PRN):  benzocaine/menthol Lozenge 2 Lozenge Oral every 4 hours PRN Sore Throat  dextrose Oral Gel 15 Gram(s) Oral once PRN Blood Glucose LESS THAN 70 milliGRAM(s)/deciliter  melatonin 5 milliGRAM(s) Oral at bedtime PRN Sleep      Weight: 101kg [5 July 2023]  Daily   99.9kg [9 July 2023]  Daily 102.1kg [10 July 2023]    Weight Change: weight gain ? r/t fluid shifts, edema. previously noted with trace generalized edema    IBW: 86.4kg   % IBW: 118.2% IBW    Estimated energy needs:   Ideal body weight (86.4kg) used for calculations as pt >100% of IBW, BMI <30 per [facility] Standards of Care. Needs estimated for age and adjusted for current clinical status, increased needs for post-op & open abd wound healing    Calories: 8969-4082 kcals based on 30-35 kcals/kg  Protein: 172.8-216 g protein based on 2.0-2.5g protein/kg  *Fluid needs per team    Subjective:   78M w/ Crohn's, AFib/Flutter s/p DCCVs on amiodarone, remote ileocectomy and open appy here for SBO vs Crohn’s flare, s/p NGT decompression and now s/p lap TRE converted to open TRE, SBR x 3, left in discontinuity with abthera vac on 6/27, RTOR for ileocolic resection, small bowel anastomosis, and abdominal wall closure on 6/28, and s/p IR aspiration of perihepatic fluid on 7/3, stepped down to telemetery on 7/4. wound dehiscence on 7/5, RTOR 7/5 for exlap, washout. Extubated 7/6. Continues on TPN and started on CLD on 7/12. Started on cholestyramine and octreotide. Seen by SLP on 7/14 and cleared for soft & bite sized diet as appropriate.    Chart reviewed. Pt seen today on 5 EA with IDT during AM rounds. Labs & Rx reviewed. Afebrile. HR WNL, BP WNL to low. MAPs >65 mmHg. I&Os x 24hrs: 2688 / 3300 = -612ml net. 340ml EC fistula output [downtrending] + 500ml wound vac output x 24hrs. On room air. Now NPO w/ sips & chips due to high output fistula, TPN via PICC as primary means to nutrition. Current provision provides 30.0 kcals/kg & 1.86g protein/kg IBW. 24hr urinary urea nitrogen collection completed 7/30- calculated nitrogen balance of +2.86g nitrogen / +17.8g protein. TPN provision increased to provide additional 15g protein to support wound healing. Noted elevated LFTs. . RDN will continue to monitor, reassess, and intervene as appropriate.     Previous Nutrition Diagnosis: Increased Nutrient Needs R/T physiological demands for nutrient AEB post-op wound healing    Active [ X  ]  Resolved [   ]    If resolved, new PES:     Goal:  Pt will meet at least 75% of protein & energy needs via most appropriate route for nutrition     Recommendations:  1. Continue TPN via PICC as primary means to nutrition  - recommend 427g dextrose + 175 g AA + 50g SMOF lipids; provides 2651.8 kcals, 175g protein, GIR 2.9 mg/kg/min [30.6 kcals/kg & 2.0 g protein/kg IBW, 22.6 non-protein kcals/kg]  - fluids & lytes per team [monitor outputs & adjust fluids prn]  2. Monitor GI tract viability for potential diet advancement [EN/PO]  3. Vitamins/minerals per team  - consider increasing Vit C to promote wound healing  4. Weekly lipid panel  - hold lipid infusion if TG >400  5. Daily BMP/Mg/Phos, POC BG Q4-6hrs  6. Pain regimen per team  7. Monitor clinical course & adjust recommendations prn    Education: NPO status    Risk Level: High [  X ] Moderate [   ] Low [   ]

## 2023-08-02 NOTE — PROGRESS NOTE ADULT - SUBJECTIVE AND OBJECTIVE BOX
SUBJECTIVE:   Patient seen and examined at bedside this AM, POD 27 s/p RTOR exlap, ileocolic with end ileostomy, washout, blow hole colostomy, fistula, red rubber from ileostomy to small bowel anastomosis   No acute overnight events   Small amount of leakage appreciated around EC fistula drain bag site this AM   Subjectively well, without any complaints or concerns   Voiding spontaneously   Minimal ileostomy output       MEDICATIONS  (STANDING):  aMIOdarone    Tablet 200 milliGRAM(s) Oral daily  amLODIPine   Tablet 10 milliGRAM(s) Oral daily  atorvastatin 20 milliGRAM(s) Oral at bedtime  chlorhexidine 2% Cloths 1 Application(s) Topical <User Schedule>  cholestyramine Powder (Sugar-Free) 4 Gram(s) Oral two times a day  dextrose 5%. 1000 milliLiter(s) (50 mL/Hr) IV Continuous <Continuous>  dextrose 5%. 1000 milliLiter(s) (100 mL/Hr) IV Continuous <Continuous>  dextrose 50% Injectable 25 Gram(s) IV Push once  dextrose 50% Injectable 12.5 Gram(s) IV Push once  dextrose 50% Injectable 25 Gram(s) IV Push once  glucagon  Injectable 1 milliGRAM(s) IntraMuscular once  heparin   Injectable 5000 Unit(s) SubCutaneous every 8 hours  insulin lispro (ADMELOG) corrective regimen sliding scale   SubCutaneous every 6 hours  levothyroxine 50 MICROGram(s) Oral daily  lipid, fat emulsion (Fish Oil and Plant Based) 20% Infusion 0.5 Gm/kG/Day (20.83 mL/Hr) IV Continuous <Continuous>  losartan 25 milliGRAM(s) Oral daily  metoprolol tartrate 25 milliGRAM(s) Oral every 12 hours  nystatin Powder 1 Application(s) Topical three times a day  octreotide  Injectable 100 MICROGram(s) IV Push every 8 hours  pantoprazole  Injectable 40 milliGRAM(s) IV Push daily  Parenteral Nutrition - Adult 1 Each (96 mL/Hr) TPN Continuous <Continuous>  venlafaxine XR. 300 milliGRAM(s) Oral daily    MEDICATIONS  (PRN):  benzocaine/menthol Lozenge 2 Lozenge Oral every 4 hours PRN Sore Throat  dextrose Oral Gel 15 Gram(s) Oral once PRN Blood Glucose LESS THAN 70 milliGRAM(s)/deciliter  melatonin 5 milliGRAM(s) Oral at bedtime PRN Sleep      Vital Signs Last 24 Hrs  T(C): 36.6 (02 Aug 2023 00:56), Max: 36.7 (01 Aug 2023 18:08)  T(F): 97.8 (02 Aug 2023 00:56), Max: 98.1 (01 Aug 2023 18:08)  HR: 83 (02 Aug 2023 04:00) (81 - 87)  BP: 157/73 (02 Aug 2023 04:00) (112/59 - 157/73)  BP(mean): 105 (02 Aug 2023 04:00) (78 - 105)  RR: 23 (02 Aug 2023 04:00) (20 - 35)  SpO2: 98% (02 Aug 2023 04:00) (95% - 98%)    Parameters below as of 02 Aug 2023 04:00  Patient On (Oxygen Delivery Method): room air        Physical Exam:  General: NAD, resting comfortably in bed  Pulmonary: Nonlabored breathing, no respiratory distress in room air; 1000 mL on IS  Cardiovascular: NSR  Abdominal: soft, vac with visible suction with red rubber drain, JOYCE drain bilious left abdomen   Extremities: WWP, normal strength  Neuro: A/O x 3, CNs II-XII grossly intact, no focal deficits, normal motor/sensation       I&O's Summary    31 Jul 2023 07:01  -  01 Aug 2023 07:00  --------------------------------------------------------  IN: 2718 mL / OUT: 2945 mL / NET: -227 mL    01 Aug 2023 07:01  -  02 Aug 2023 06:19  --------------------------------------------------------  IN: 2475 mL / OUT: 2920 mL / NET: -445 mL        LABS:                        8.7    12.93 )-----------( 336      ( 01 Aug 2023 06:20 )             27.3     08-01    138  |  105  |  23  ----------------------------<  96  4.0   |  27  |  0.71    Ca    7.6<L>      01 Aug 2023 06:20  Phos  3.1     08-01  Mg     2.0     08-01    TPro  7.4  /  Alb  2.2<L>  /  TBili  3.6<H>  /  DBili  x   /  AST  125<H>  /  ALT  98<H>  /  AlkPhos  177<H>  08-01      Urinalysis Basic - ( 01 Aug 2023 06:20 )    Color: x / Appearance: x / SG: x / pH: x  Gluc: 96 mg/dL / Ketone: x  / Bili: x / Urobili: x   Blood: x / Protein: x / Nitrite: x   Leuk Esterase: x / RBC: x / WBC x   Sq Epi: x / Non Sq Epi: x / Bacteria: x      CAPILLARY BLOOD GLUCOSE      POCT Blood Glucose.: 121 mg/dL (01 Aug 2023 23:12)  POCT Blood Glucose.: 125 mg/dL (01 Aug 2023 18:43)  POCT Blood Glucose.: 121 mg/dL (01 Aug 2023 12:12)    LIVER FUNCTIONS - ( 01 Aug 2023 06:20 )  Alb: 2.2 g/dL / Pro: 7.4 g/dL / ALK PHOS: 177 U/L / ALT: 98 U/L / AST: 125 U/L / GGT: x             RADIOLOGY & ADDITIONAL STUDIES:   SUBJECTIVE:   Patient seen and examined at bedside this AM, POD 27 s/p RTOR exlap, ileocolic with end ileostomy, washout, blow hole colostomy, fistula, red rubber from ileostomy to small bowel anastomosis   No acute overnight events   Subjectively well, without any complaints or concerns   Voiding spontaneously   Minimal ileostomy output       MEDICATIONS  (STANDING):  aMIOdarone    Tablet 200 milliGRAM(s) Oral daily  amLODIPine   Tablet 10 milliGRAM(s) Oral daily  atorvastatin 20 milliGRAM(s) Oral at bedtime  chlorhexidine 2% Cloths 1 Application(s) Topical <User Schedule>  cholestyramine Powder (Sugar-Free) 4 Gram(s) Oral two times a day  dextrose 5%. 1000 milliLiter(s) (50 mL/Hr) IV Continuous <Continuous>  dextrose 5%. 1000 milliLiter(s) (100 mL/Hr) IV Continuous <Continuous>  dextrose 50% Injectable 25 Gram(s) IV Push once  dextrose 50% Injectable 12.5 Gram(s) IV Push once  dextrose 50% Injectable 25 Gram(s) IV Push once  glucagon  Injectable 1 milliGRAM(s) IntraMuscular once  heparin   Injectable 5000 Unit(s) SubCutaneous every 8 hours  insulin lispro (ADMELOG) corrective regimen sliding scale   SubCutaneous every 6 hours  levothyroxine 50 MICROGram(s) Oral daily  lipid, fat emulsion (Fish Oil and Plant Based) 20% Infusion 0.5 Gm/kG/Day (20.83 mL/Hr) IV Continuous <Continuous>  losartan 25 milliGRAM(s) Oral daily  metoprolol tartrate 25 milliGRAM(s) Oral every 12 hours  nystatin Powder 1 Application(s) Topical three times a day  octreotide  Injectable 100 MICROGram(s) IV Push every 8 hours  pantoprazole  Injectable 40 milliGRAM(s) IV Push daily  Parenteral Nutrition - Adult 1 Each (96 mL/Hr) TPN Continuous <Continuous>  venlafaxine XR. 300 milliGRAM(s) Oral daily    MEDICATIONS  (PRN):  benzocaine/menthol Lozenge 2 Lozenge Oral every 4 hours PRN Sore Throat  dextrose Oral Gel 15 Gram(s) Oral once PRN Blood Glucose LESS THAN 70 milliGRAM(s)/deciliter  melatonin 5 milliGRAM(s) Oral at bedtime PRN Sleep      Vital Signs Last 24 Hrs  T(C): 36.6 (02 Aug 2023 00:56), Max: 36.7 (01 Aug 2023 18:08)  T(F): 97.8 (02 Aug 2023 00:56), Max: 98.1 (01 Aug 2023 18:08)  HR: 83 (02 Aug 2023 04:00) (81 - 87)  BP: 157/73 (02 Aug 2023 04:00) (112/59 - 157/73)  BP(mean): 105 (02 Aug 2023 04:00) (78 - 105)  RR: 23 (02 Aug 2023 04:00) (20 - 35)  SpO2: 98% (02 Aug 2023 04:00) (95% - 98%)    Parameters below as of 02 Aug 2023 04:00  Patient On (Oxygen Delivery Method): room air        Physical Exam:  General: NAD, resting comfortably in bed  Pulmonary: Nonlabored breathing, no respiratory distress in room air; 1000 mL on IS  Cardiovascular: NSR  Abdominal: soft, vac with visible suction with red rubber drain, JOYCE drain bilious left abdomen   Extremities: WWP, normal strength  Neuro: A/O x 3, CNs II-XII grossly intact, no focal deficits, normal motor/sensation       I&O's Summary    31 Jul 2023 07:01  -  01 Aug 2023 07:00  --------------------------------------------------------  IN: 2718 mL / OUT: 2945 mL / NET: -227 mL    01 Aug 2023 07:01  -  02 Aug 2023 06:19  --------------------------------------------------------  IN: 2475 mL / OUT: 2920 mL / NET: -445 mL        LABS:                        8.7    12.93 )-----------( 336      ( 01 Aug 2023 06:20 )             27.3     08-01    138  |  105  |  23  ----------------------------<  96  4.0   |  27  |  0.71    Ca    7.6<L>      01 Aug 2023 06:20  Phos  3.1     08-01  Mg     2.0     08-01    TPro  7.4  /  Alb  2.2<L>  /  TBili  3.6<H>  /  DBili  x   /  AST  125<H>  /  ALT  98<H>  /  AlkPhos  177<H>  08-01      Urinalysis Basic - ( 01 Aug 2023 06:20 )    Color: x / Appearance: x / SG: x / pH: x  Gluc: 96 mg/dL / Ketone: x  / Bili: x / Urobili: x   Blood: x / Protein: x / Nitrite: x   Leuk Esterase: x / RBC: x / WBC x   Sq Epi: x / Non Sq Epi: x / Bacteria: x      CAPILLARY BLOOD GLUCOSE      POCT Blood Glucose.: 121 mg/dL (01 Aug 2023 23:12)  POCT Blood Glucose.: 125 mg/dL (01 Aug 2023 18:43)  POCT Blood Glucose.: 121 mg/dL (01 Aug 2023 12:12)    LIVER FUNCTIONS - ( 01 Aug 2023 06:20 )  Alb: 2.2 g/dL / Pro: 7.4 g/dL / ALK PHOS: 177 U/L / ALT: 98 U/L / AST: 125 U/L / GGT: x             RADIOLOGY & ADDITIONAL STUDIES:

## 2023-08-02 NOTE — PROGRESS NOTE ADULT - SUBJECTIVE AND OBJECTIVE BOX
Pt seen and examined   no complaints  sitting in chair  ostomy functioning  for wound/dressings change today    REVIEW OF SYSTEMS:  Constitutional: No fever,   Cardiovascular: No chest pain, palpitations, dizziness   Gastrointestinal: No abdominal or epigastric pain. No nausea, vomiting   Skin: No itching, burning, rashes or lesions       MEDICATIONS:  MEDICATIONS  (STANDING):  aMIOdarone    Tablet 200 milliGRAM(s) Oral daily  amLODIPine   Tablet 10 milliGRAM(s) Oral daily  atorvastatin 20 milliGRAM(s) Oral at bedtime  chlorhexidine 2% Cloths 1 Application(s) Topical <User Schedule>  cholestyramine Powder (Sugar-Free) 4 Gram(s) Oral two times a day  dextrose 5%. 1000 milliLiter(s) (50 mL/Hr) IV Continuous <Continuous>  dextrose 5%. 1000 milliLiter(s) (100 mL/Hr) IV Continuous <Continuous>  dextrose 50% Injectable 25 Gram(s) IV Push once  dextrose 50% Injectable 12.5 Gram(s) IV Push once  dextrose 50% Injectable 25 Gram(s) IV Push once  glucagon  Injectable 1 milliGRAM(s) IntraMuscular once  heparin   Injectable 5000 Unit(s) SubCutaneous every 8 hours  insulin lispro (ADMELOG) corrective regimen sliding scale   SubCutaneous every 6 hours  levothyroxine 50 MICROGram(s) Oral daily  lipid, fat emulsion (Fish Oil and Plant Based) 20% Infusion 0.5 Gm/kG/Day (20.83 mL/Hr) IV Continuous <Continuous>  losartan 25 milliGRAM(s) Oral daily  metoprolol tartrate 25 milliGRAM(s) Oral every 12 hours  nystatin Powder 1 Application(s) Topical three times a day  octreotide  Injectable 100 MICROGram(s) IV Push every 8 hours  pantoprazole  Injectable 40 milliGRAM(s) IV Push daily  Parenteral Nutrition - Adult 1 Each (96 mL/Hr) TPN Continuous <Continuous>  Parenteral Nutrition - Adult 1 Each (96 mL/Hr) TPN Continuous <Continuous>  venlafaxine XR. 300 milliGRAM(s) Oral daily    MEDICATIONS  (PRN):  benzocaine/menthol Lozenge 2 Lozenge Oral every 4 hours PRN Sore Throat  dextrose Oral Gel 15 Gram(s) Oral once PRN Blood Glucose LESS THAN 70 milliGRAM(s)/deciliter  melatonin 5 milliGRAM(s) Oral at bedtime PRN Sleep      Allergies    penicillin (Angioedema)    Intolerances        Vital Signs Last 24 Hrs  T(C): 37.1 (02 Aug 2023 08:00), Max: 37.1 (02 Aug 2023 08:00)  T(F): 98.8 (02 Aug 2023 08:00), Max: 98.8 (02 Aug 2023 08:00)  HR: 85 (02 Aug 2023 11:00) (81 - 87)  BP: 139/66 (02 Aug 2023 11:00) (112/59 - 165/79)  BP(mean): 94 (02 Aug 2023 11:00) (78 - 114)  RR: 24 (02 Aug 2023 11:00) (20 - 35)  SpO2: 96% (02 Aug 2023 11:00) (95% - 100%)    Parameters below as of 02 Aug 2023 11:00  Patient On (Oxygen Delivery Method): room air        08-01 @ 07:01  -  08-02 @ 07:00  --------------------------------------------------------  IN: 2688 mL / OUT: 3300 mL / NET: -612 mL    08-02 @ 07:01  -  08-02 @ 12:51  --------------------------------------------------------  IN: 480 mL / OUT: 200 mL / NET: 280 mL        PHYSICAL EXAM:    General: in no acute distress  HEENT: MMM, conjunctiva and sclera clear  Gastrointestinal: non-distended; Normal bowel sounds; vac, ileostomy  Skin: Warm and dry. No obvious rash    LABS:      CBC Full  -  ( 02 Aug 2023 05:30 )  WBC Count : 13.80 K/uL  RBC Count : 2.99 M/uL  Hemoglobin : 9.0 g/dL  Hematocrit : 28.7 %  Platelet Count - Automated : 359 K/uL  Mean Cell Volume : 96.0 fl  Mean Cell Hemoglobin : 30.1 pg  Mean Cell Hemoglobin Concentration : 31.4 gm/dL  Auto Neutrophil # : x  Auto Lymphocyte # : x  Auto Monocyte # : x  Auto Eosinophil # : x  Auto Basophil # : x  Auto Neutrophil % : x  Auto Lymphocyte % : x  Auto Monocyte % : x  Auto Eosinophil % : x  Auto Basophil % : x    08-02    137  |  103  |  22  ----------------------------<  89  4.2   |  25  |  0.74    Ca    7.6<L>      02 Aug 2023 05:30  Phos  3.4     08-02  Mg     1.9     08-02    TPro  7.6  /  Alb  2.1<L>  /  TBili  3.7<H>  /  DBili  2.6<H>  /  AST  129<H>  /  ALT  96<H>  /  AlkPhos  179<H>  08-02          Urinalysis Basic - ( 02 Aug 2023 05:30 )    Color: x / Appearance: x / SG: x / pH: x  Gluc: 89 mg/dL / Ketone: x  / Bili: x / Urobili: x   Blood: x / Protein: x / Nitrite: x   Leuk Esterase: x / RBC: x / WBC x   Sq Epi: x / Non Sq Epi: x / Bacteria: x                RADIOLOGY & ADDITIONAL STUDIES (The following images were personally reviewed):

## 2023-08-03 NOTE — CHART NOTE - NSCHARTNOTEFT_GEN_A_CORE
previously asked for consult by Dr Peterson, as patient downgraded to tele noted to already have medicine consult, will sign off case.

## 2023-08-03 NOTE — PROGRESS NOTE ADULT - SUBJECTIVE AND OBJECTIVE BOX
SUBJECTIVE:  Patient seen and examined at bedside this AM, POD 28 s/p RTOR exlap, ileocolic with end ileostomy, washout, blow hole colostomy, fistula, red rubber from ileostomy to small bowel anastomosis   No acute overnight events   Subjectively well, without any complaints or concerns   Voiding spontaneously   Minimal ileostomy output       MEDICATIONS  (STANDING):  aMIOdarone    Tablet 200 milliGRAM(s) Oral daily  amLODIPine   Tablet 10 milliGRAM(s) Oral daily  atorvastatin 20 milliGRAM(s) Oral at bedtime  chlorhexidine 2% Cloths 1 Application(s) Topical <User Schedule>  cholestyramine Powder (Sugar-Free) 4 Gram(s) Oral two times a day  glucagon  Injectable 1 milliGRAM(s) IntraMuscular once  heparin   Injectable 5000 Unit(s) SubCutaneous every 8 hours  levothyroxine 50 MICROGram(s) Oral daily  lipid, fat emulsion (Fish Oil and Plant Based) 20% Infusion 0.5 Gm/kG/Day (21 mL/Hr) IV Continuous <Continuous>  losartan 25 milliGRAM(s) Oral daily  metoprolol tartrate 25 milliGRAM(s) Oral every 12 hours  nystatin Powder 1 Application(s) Topical three times a day  octreotide  Injectable 100 MICROGram(s) IV Push every 8 hours  pantoprazole  Injectable 40 milliGRAM(s) IV Push daily  Parenteral Nutrition - Adult 1 Each (96 mL/Hr) TPN Continuous <Continuous>  Parenteral Nutrition - Adult 1 Each (96 mL/Hr) TPN Continuous <Continuous>  venlafaxine XR. 300 milliGRAM(s) Oral daily    MEDICATIONS  (PRN):  benzocaine/menthol Lozenge 2 Lozenge Oral every 4 hours PRN Sore Throat  melatonin 5 milliGRAM(s) Oral at bedtime PRN Sleep      Vital Signs Last 24 Hrs  T(C): 36.4 (03 Aug 2023 09:31), Max: 36.8 (02 Aug 2023 13:00)  T(F): 97.6 (03 Aug 2023 09:31), Max: 98.3 (02 Aug 2023 13:00)  HR: 84 (03 Aug 2023 11:35) (81 - 86)  BP: 146/70 (03 Aug 2023 11:35) (119/59 - 147/70)  BP(mean): 100 (03 Aug 2023 11:35) (82 - 101)  RR: 18 (03 Aug 2023 11:35) (18 - 39)  SpO2: 95% (03 Aug 2023 11:35) (94% - 96%)    Parameters below as of 03 Aug 2023 11:35  Patient On (Oxygen Delivery Method): room air        Physical Exam:  General: NAD, resting comfortably in bed  Pulmonary: Nonlabored breathing, no respiratory distress in room air; 1000 mL on IS  Cardiovascular: NSR  Abdominal: soft, vac with visible suction with red rubber drain, JOYCE drain left abdomen, EC fistula drain bag full with loose enteric content   Extremities: WWP, normal strength  Neuro: A/O x 3, no focal deficits, normal motor/sensation     I&O's Summary    02 Aug 2023 07:01  -  03 Aug 2023 07:00  --------------------------------------------------------  IN: 2595.2 mL / OUT: 2390 mL / NET: 205.2 mL        LABS:                        8.9    10.28 )-----------( 330      ( 03 Aug 2023 05:30 )             28.8     08-03    137  |  105  |  24<H>  ----------------------------<  124<H>  4.0   |  28  |  0.72    Ca    8.2<L>      03 Aug 2023 05:30  Phos  3.4     08-03  Mg     2.0     08-03    TPro  7.6  /  Alb  2.1<L>  /  TBili  3.6<H>  /  DBili  2.6<H>  /  AST  111<H>  /  ALT  92<H>  /  AlkPhos  166<H>  08-03      Urinalysis Basic - ( 03 Aug 2023 05:30 )    Color: x / Appearance: x / SG: x / pH: x  Gluc: 124 mg/dL / Ketone: x  / Bili: x / Urobili: x   Blood: x / Protein: x / Nitrite: x   Leuk Esterase: x / RBC: x / WBC x   Sq Epi: x / Non Sq Epi: x / Bacteria: x      CAPILLARY BLOOD GLUCOSE      POCT Blood Glucose.: 124 mg/dL (03 Aug 2023 06:48)  POCT Blood Glucose.: 115 mg/dL (03 Aug 2023 00:00)  POCT Blood Glucose.: 129 mg/dL (02 Aug 2023 18:23)  POCT Blood Glucose.: 112 mg/dL (02 Aug 2023 17:14)  POCT Blood Glucose.: 130 mg/dL (02 Aug 2023 12:46)    LIVER FUNCTIONS - ( 03 Aug 2023 05:30 )  Alb: 2.1 g/dL / Pro: 7.6 g/dL / ALK PHOS: 166 U/L / ALT: 92 U/L / AST: 111 U/L / GGT: x             RADIOLOGY & ADDITIONAL STUDIES:

## 2023-08-03 NOTE — PROGRESS NOTE ADULT - SUBJECTIVE AND OBJECTIVE BOX
Patient evaluated with chief resident during AM rounds    STATUS POST:  Exploratory laparotomy    Laparoscopic lysis of intestinal adhesions    Exploratory laparotomy    Open lysis of intestinal adhesions    Resection of small bowel    Temporary closure of abdominal cavity    Repair, anastomosis, ileocolic    Small bowel resection with anastomosis    Flap, myocutaneous, abdominal wall    Reconstruction of abdominal wall using mesh    Washout of abdominal cavity    Creation of ileostomy    Creation of colostomy    Laparoscopic lysis of intestinal adhesions    Open lysis of intestinal adhesions    Resection of small bowel    Temporary closure of abdominal cavity    Repair, anastomosis, ileocolic    Small bowel resection with anastomosis    Flap, myocutaneous, abdominal wall    Reconstruction of abdominal wall using mesh    Washout of abdominal cavity      Overnight Events: ON AMRITA. High output from EC fistula. Discontinue ISS.   SUBJECTIVE: Patient comfortable in AM on rounds. Minimal pain, well controlled on medication. Able to go to chair with minimal assistance. Voiding without issue. Minimal ileostomy output.     Denies Chest Pain, Difficulty breathing, Calf Pain, Headache, Dizziness    OBJECTIVE:  Vital Signs Last 24 Hrs  T(C): 36.8 (03 Aug 2023 18:00), Max: 36.8 (02 Aug 2023 22:24)  T(F): 98.2 (03 Aug 2023 18:00), Max: 98.2 (02 Aug 2023 22:24)  HR: 84 (03 Aug 2023 11:35) (84 - 94)  BP: 146/70 (03 Aug 2023 11:35) (125/67 - 147/70)  BP(mean): 100 (03 Aug 2023 11:35) (90 - 105)  RR: 18 (03 Aug 2023 11:35) (18 - 18)  SpO2: 95% (03 Aug 2023 11:35) (94% - 95%)    Parameters below as of 03 Aug 2023 11:35  Patient On (Oxygen Delivery Method): room air        I&O's Summary    02 Aug 2023 07:01  -  03 Aug 2023 07:00  --------------------------------------------------------  IN: 2595.2 mL / OUT: 2390 mL / NET: 205.2 mL    03 Aug 2023 07:01  -  03 Aug 2023 19:10  --------------------------------------------------------  IN: 960 mL / OUT: 1205 mL / NET: -245 mL        Physical Exam:  General Appearance: Appears well, NAD  Pulmonary: Nonlabored breathing, no respiratory distress  Cardiovascular: NSR  Abdomen: Soft, vac in place with suction, red rubber drain in place to suction. Drain in left abdomen. EC fistula drain bag to urometer.  appropriate incisional tenderness, dressings clean and dry and intact  Extremities: WWP, SCD's in place     LABS:                        8.9    10.28 )-----------( 330      ( 03 Aug 2023 05:30 )             28.8     08-03    137  |  105  |  24<H>  ----------------------------<  124<H>  4.0   |  28  |  0.72    Ca    8.2<L>      03 Aug 2023 05:30  Phos  3.4     08-03  Mg     2.0     08-03    TPro  7.6  /  Alb  2.1<L>  /  TBili  3.6<H>  /  DBili  2.6<H>  /  AST  111<H>  /  ALT  92<H>  /  AlkPhos  166<H>  08-03      Urinalysis Basic - ( 03 Aug 2023 05:30 )    Color: x / Appearance: x / SG: x / pH: x  Gluc: 124 mg/dL / Ketone: x  / Bili: x / Urobili: x   Blood: x / Protein: x / Nitrite: x   Leuk Esterase: x / RBC: x / WBC x   Sq Epi: x / Non Sq Epi: x / Bacteria: x

## 2023-08-03 NOTE — PROGRESS NOTE ADULT - ASSESSMENT
78M w/ Crohn's, AFib/Flutter s/p DCCVs on amiodarone, remote ileocectomy and open appy here for SBO vs Crohns flare, s/p NGT decompression and now s/p lap TRE converted to open TRE, SBR x 3, left in discontinuity with abthera vac on 6/27, RTOR for ileocolic resection, small bowel anastomosis, and abdominal wall closure on 6/28, and s/p IR aspiration of perihepatic fluid on 7/3, stepped down to telemetry on 7/4. wound dehiscence on 7/5, RTOR ex-lap, washout, ileocolic resection with end ileostomy, blow hole colostomy, red rubber from ileostomy to small bowel anastomosis; vicryl bridging mesh on 7/5. Transferred to SICU post op for HD monitoring. Extubated 7/6. Leukocytosis resolved. Transaminitis improving    Plan:   - PRN pain control   - Hx of A-fib/flutter; continue rate and rhythm control   - Encourage IS/OOB   - NPO w/Sips & chips; continue TPN   - Wound vac change per Surgery tomorrow   - Monitor fistula drain output   - Appreciate ostomy appliance/wound manager/wound vac care per wound care team and RN   - Monitor WBC curve. Topical Nystatin   - DVT mechanical and chemo prophylaxis   - Continued care per SICU team      Plan discussed with chief resident and attending, Dr. Knapp

## 2023-08-03 NOTE — PROGRESS NOTE ADULT - SUBJECTIVE AND OBJECTIVE BOX
NPO per surgical team    Pt seen and examined   no complaints  out of bed  NPO    REVIEW OF SYSTEMS:  Constitutional: No fever,   Cardiovascular: No chest pain, palpitations, dizziness or leg swelling  Gastrointestinal: No abdominal or epigastric pain. No nausea,  Skin: No itching, burning, rashes or lesions       MEDICATIONS:  MEDICATIONS  (STANDING):  aMIOdarone    Tablet 200 milliGRAM(s) Oral daily  amLODIPine   Tablet 10 milliGRAM(s) Oral daily  atorvastatin 20 milliGRAM(s) Oral at bedtime  chlorhexidine 2% Cloths 1 Application(s) Topical <User Schedule>  cholestyramine Powder (Sugar-Free) 4 Gram(s) Oral two times a day  glucagon  Injectable 1 milliGRAM(s) IntraMuscular once  heparin   Injectable 5000 Unit(s) SubCutaneous every 8 hours  levothyroxine 50 MICROGram(s) Oral daily  lipid, fat emulsion (Fish Oil and Plant Based) 20% Infusion 0.5 Gm/kG/Day (20.83 mL/Hr) IV Continuous <Continuous>  losartan 25 milliGRAM(s) Oral daily  metoprolol tartrate 25 milliGRAM(s) Oral every 12 hours  nystatin Powder 1 Application(s) Topical three times a day  octreotide  Injectable 100 MICROGram(s) IV Push every 8 hours  pantoprazole  Injectable 40 milliGRAM(s) IV Push daily  Parenteral Nutrition - Adult 1 Each (96 mL/Hr) TPN Continuous <Continuous>  Parenteral Nutrition - Adult 1 Each (96 mL/Hr) TPN Continuous <Continuous>  venlafaxine XR. 300 milliGRAM(s) Oral daily    MEDICATIONS  (PRN):  benzocaine/menthol Lozenge 2 Lozenge Oral every 4 hours PRN Sore Throat  melatonin 5 milliGRAM(s) Oral at bedtime PRN Sleep      Allergies    penicillin (Angioedema)    Intolerances        Vital Signs Last 24 Hrs  T(C): 36.4 (03 Aug 2023 09:31), Max: 36.8 (02 Aug 2023 22:24)  T(F): 97.6 (03 Aug 2023 09:31), Max: 98.2 (02 Aug 2023 22:24)  HR: 84 (03 Aug 2023 11:35) (81 - 86)  BP: 146/70 (03 Aug 2023 11:35) (119/59 - 147/70)  BP(mean): 100 (03 Aug 2023 11:35) (82 - 101)  RR: 18 (03 Aug 2023 11:35) (18 - 39)  SpO2: 95% (03 Aug 2023 11:35) (94% - 96%)    Parameters below as of 03 Aug 2023 11:35  Patient On (Oxygen Delivery Method): room air        08-02 @ 07:01 - 08-03 @ 07:00  --------------------------------------------------------  IN: 2595.2 mL / OUT: 2390 mL / NET: 205.2 mL    08-03 @ 07:01 - 08-03 @ 13:44  --------------------------------------------------------  IN: 576 mL / OUT: 825 mL / NET: -249 mL        PHYSICAL EXAM:    General:  in no acute distress  HEENT: MMM, conjunctiva and sclera clear  Gastrointestinal: non-distended; Normal bowel sounds; ileostomy, dressings  Skin: Warm and dry. No obvious rash    LABS:      CBC Full  -  ( 03 Aug 2023 05:30 )  WBC Count : 10.28 K/uL  RBC Count : 2.97 M/uL  Hemoglobin : 8.9 g/dL  Hematocrit : 28.8 %  Platelet Count - Automated : 330 K/uL  Mean Cell Volume : 97.0 fl  Mean Cell Hemoglobin : 30.0 pg  Mean Cell Hemoglobin Concentration : 30.9 gm/dL  Auto Neutrophil # : x  Auto Lymphocyte # : x  Auto Monocyte # : x  Auto Eosinophil # : x  Auto Basophil # : x  Auto Neutrophil % : x  Auto Lymphocyte % : x  Auto Monocyte % : x  Auto Eosinophil % : x  Auto Basophil % : x    08-03    137  |  105  |  24<H>  ----------------------------<  124<H>  4.0   |  28  |  0.72    Ca    8.2<L>      03 Aug 2023 05:30  Phos  3.4     08-03  Mg     2.0     08-03    TPro  7.6  /  Alb  2.1<L>  /  TBili  3.6<H>  /  DBili  2.6<H>  /  AST  111<H>  /  ALT  92<H>  /  AlkPhos  166<H>  08-03          Urinalysis Basic - ( 03 Aug 2023 05:30 )    Color: x / Appearance: x / SG: x / pH: x  Gluc: 124 mg/dL / Ketone: x  / Bili: x / Urobili: x   Blood: x / Protein: x / Nitrite: x   Leuk Esterase: x / RBC: x / WBC x   Sq Epi: x / Non Sq Epi: x / Bacteria: x                RADIOLOGY & ADDITIONAL STUDIES (The following images were personally reviewed):

## 2023-08-03 NOTE — PROGRESS NOTE ADULT - ASSESSMENT
78M w/ Crohn's, AFib/Flutter s/p DCCVs on amiodarone, remote ileocectomy and open appy here for SBO vs Crohns flare, s/p NGT decompression and now s/p lap TRE converted to open TRE, SBR x 3, left in discontinuity with abthera vac on 6/27, RTOR for ileocolic resection, small bowel anastomosis, and abdominal wall closure on 6/28, and s/p IR aspiration of perihepatic fluid on 7/3, stepped down to telemetery on 7/4. wound dehisence on 7/5, RTOR exlap, washout, ileocolic resection with end ileostomy, blow hole colostomy, red rubber from ileostomy to small bowel anastomosis; vicryl bridging mesh on 7/5. Transferred to SICU post op for HD monitoring. Extubated 7/6. SDU 8/2    NPO w. sips/ TPN  Pain/Nausea control  Encourage OOB/Ambu  Home; venlafaxine, losartan, rosuvastatin,synthroid  Wound vac, fistula drain (to suction) vac change 8/4 pending  amiodarone, metoprolol  Monitor drain outputs

## 2023-08-04 NOTE — PROGRESS NOTE ADULT - SUBJECTIVE AND OBJECTIVE BOX
SUBJECTIVE:  Patient seen and examined at bedside this AM, POD 29 s/p RTOR exlap, ileocolic with end ileostomy, washout, blow hole colostomy, fistula, red rubber from ileostomy to small bowel anastomosis   No acute overnight events   Subjectively well, without any complaints or concerns   Voiding spontaneously   Minimal ileostomy output       MEDICATIONS  (STANDING):  aMIOdarone    Tablet 200 milliGRAM(s) Oral daily  amLODIPine   Tablet 10 milliGRAM(s) Oral daily  atorvastatin 20 milliGRAM(s) Oral at bedtime  chlorhexidine 2% Cloths 1 Application(s) Topical <User Schedule>  cholestyramine Powder (Sugar-Free) 4 Gram(s) Oral two times a day  glucagon  Injectable 1 milliGRAM(s) IntraMuscular once  heparin   Injectable 5000 Unit(s) SubCutaneous every 8 hours  levothyroxine 50 MICROGram(s) Oral daily  lipid, fat emulsion (Fish Oil and Plant Based) 20% Infusion 0.5 Gm/kG/Day (20.83 mL/Hr) IV Continuous <Continuous>  losartan 25 milliGRAM(s) Oral daily  metoprolol tartrate 25 milliGRAM(s) Oral every 12 hours  nystatin Powder 1 Application(s) Topical three times a day  octreotide  Injectable 100 MICROGram(s) IV Push every 8 hours  pantoprazole  Injectable 40 milliGRAM(s) IV Push daily  Parenteral Nutrition - Adult 1 Each (96 mL/Hr) TPN Continuous <Continuous>  venlafaxine XR. 300 milliGRAM(s) Oral daily    MEDICATIONS  (PRN):  benzocaine/menthol Lozenge 2 Lozenge Oral every 4 hours PRN Sore Throat  melatonin 5 milliGRAM(s) Oral at bedtime PRN Sleep      Vital Signs Last 24 Hrs  T(C): 36.9 (04 Aug 2023 05:00), Max: 36.9 (04 Aug 2023 05:00)  T(F): 98.4 (04 Aug 2023 05:00), Max: 98.4 (04 Aug 2023 05:00)  HR: 84 (04 Aug 2023 06:10) (84 - 94)  BP: 146/72 (04 Aug 2023 06:10) (126/61 - 157/75)  BP(mean): 104 (04 Aug 2023 06:10) (88 - 108)  RR: 18 (04 Aug 2023 06:10) (18 - 18)  SpO2: 95% (04 Aug 2023 06:10) (94% - 100%)    Parameters below as of 04 Aug 2023 06:10  Patient On (Oxygen Delivery Method): room air        Physical Exam:  General: NAD, resting comfortably in bed  Pulmonary: Nonlabored breathing, no respiratory distress in room air; 1000 mL on IS  Cardiovascular: NSR  Abdominal: soft, vac with visible suction with red rubber drain, JOYCE drain left abdomen, EC fistula drain (0 cc/75 cc)abdominal drain to gravity (400 cc/1225 cc)  Extremities: WWP, normal strength  Neuro: A/O x 3, no focal deficits, normal motor/sensation    I&O's Summary    03 Aug 2023 07:01  -  04 Aug 2023 07:00  --------------------------------------------------------  IN: 2456.6 mL / OUT: 3335 mL / NET: -878.4 mL        LABS:                        9.3    11.81 )-----------( 350      ( 04 Aug 2023 07:11 )             30.0     08-03    137  |  105  |  24<H>  ----------------------------<  124<H>  4.0   |  28  |  0.72    Ca    8.2<L>      03 Aug 2023 05:30  Phos  3.4     08-03  Mg     2.0     08-03    TPro  7.6  /  Alb  2.1<L>  /  TBili  3.6<H>  /  DBili  2.6<H>  /  AST  111<H>  /  ALT  92<H>  /  AlkPhos  166<H>  08-03      Urinalysis Basic - ( 03 Aug 2023 05:30 )    Color: x / Appearance: x / SG: x / pH: x  Gluc: 124 mg/dL / Ketone: x  / Bili: x / Urobili: x   Blood: x / Protein: x / Nitrite: x   Leuk Esterase: x / RBC: x / WBC x   Sq Epi: x / Non Sq Epi: x / Bacteria: x      CAPILLARY BLOOD GLUCOSE        LIVER FUNCTIONS - ( 03 Aug 2023 05:30 )  Alb: 2.1 g/dL / Pro: 7.6 g/dL / ALK PHOS: 166 U/L / ALT: 92 U/L / AST: 111 U/L / GGT: x             RADIOLOGY & ADDITIONAL STUDIES:

## 2023-08-04 NOTE — PROGRESS NOTE ADULT - ASSESSMENT

## 2023-08-04 NOTE — PROGRESS NOTE ADULT - ASSESSMENT
78M w/ Crohn's, AFib/Flutter s/p DCCVs on amiodarone, remote ileocectomy and open appy here for SBO vs Crohns flare, s/p NGT decompression and now s/p lap TRE converted to open TRE, SBR x 3, left in discontinuity with abthera vac on 6/27, RTOR for ileocolic resection, small bowel anastomosis, and abdominal wall closure on 6/28, and s/p IR aspiration of perihepatic fluid on 7/3, stepped down to telemetry on 7/4. wound dehiscence on 7/5, RTOR ex-lap, washout, ileocolic resection with end ileostomy, blow hole colostomy, red rubber from ileostomy to small bowel anastomosis; Vicryl bridging mesh on 7/5. Transferred to SICU post op for HD monitoring. Extubated 7/6.   Decreased fistula output. Mild leukocytosis. Transaminitis improving    Plan:   - NPO w/Sips & chips; continue TPN   - Wound vac change per Surgery today   - Monitor fistula drain output   - PRN pain control   - Hx of A-fib/flutter; continue rate and rhythm control   - Encourage IS/OOB   - Appreciate ostomy appliance/wound manager/wound vac care per wound care team and RN   - DVT mechanical and chemo prophylaxis       Plans discussed with chief resident and attending

## 2023-08-04 NOTE — PROGRESS NOTE ADULT - SUBJECTIVE AND OBJECTIVE BOX
STATUS POST:    6/28: ex lap, removal of abthera, ileocolic resection, small bowel anastamosis, , 1.6 crystalloid, 250 5%, 175 uop   7/3: IR del drained perihepatic aspiration of serous fluid.   7/5: RTOR exlap, washout, ileocolic resection with end ileostomy, blow hole colostomy, fistula, red rubber from ileostomy to small bowel anastomosis; vicryl bridging mesh; R JOYCE below ileostomy, L JOYCE at small bowel enterotomy repair; 500 LR, 500 5% albumin, 3u PRBC, 2 FFP, 400 UOP,     ON; AMRITA     SUBJECTIVE: Patient seen and examined bedside by chief resident. No acute complaints at this time. NPO with sips, expressing desire for more Po intake -n/-v/ostomy +gas/ min o/p. denies sob/ross/cp/dizziness    aMIOdarone    Tablet 200 milliGRAM(s) Oral daily  amLODIPine   Tablet 10 milliGRAM(s) Oral daily  heparin   Injectable 5000 Unit(s) SubCutaneous every 8 hours  losartan 25 milliGRAM(s) Oral daily  metoprolol tartrate 25 milliGRAM(s) Oral every 12 hours      Vital Signs Last 24 Hrs  T(C): 36.5 (04 Aug 2023 18:23), Max: 37.2 (04 Aug 2023 14:00)  T(F): 97.7 (04 Aug 2023 18:23), Max: 99 (04 Aug 2023 14:00)  HR: 84 (04 Aug 2023 15:50) (82 - 84)  BP: 160/74 (04 Aug 2023 15:50) (127/62 - 160/74)  BP(mean): 106 (04 Aug 2023 15:45) (88 - 108)  RR: 18 (04 Aug 2023 15:50) (18 - 18)  SpO2: 93% (04 Aug 2023 15:50) (93% - 100%)    Parameters below as of 04 Aug 2023 15:50  Patient On (Oxygen Delivery Method): room air      I&O's Detail    03 Aug 2023 07:01  -  04 Aug 2023 07:00  --------------------------------------------------------  IN:    Fat Emulsion 20%: 249.6 mL    TPN (Total Parenteral Nutrition): 2207 mL  Total IN: 2456.6 mL    OUT:    Bulb (mL): 35 mL    Drain (mL): 75 mL    Drain (mL): 1225 mL    Ileostomy (mL): 20 mL    VAC (Vacuum Assisted Closure) System (mL): 160 mL    Voided (mL): 1820 mL  Total OUT: 3335 mL    Total NET: -878.4 mL      04 Aug 2023 07:01  -  04 Aug 2023 18:42  --------------------------------------------------------  IN:    IV PiggyBack: 500 mL    TPN (Total Parenteral Nutrition): 864 mL  Total IN: 1364 mL    OUT:    Bulb (mL): 2 mL    Drain (mL): 0 mL    Drain (mL): 200 mL    Ileostomy (mL): 10 mL    VAC (Vacuum Assisted Closure) System (mL): 20 mL    Voided (mL): 790 mL  Total OUT: 1022 mL    Total NET: 342 mL          General: NAD, resting comfortably in bed  C/V: NSR  Pulm: Nonlabored breathing, no respiratory distress  Abd: soft, NT/ND. No rebound or guarding  Abdominal: soft, vac with visible suction, JOYCE drain left abdomen, EC fistula drain (0 cc/75 cc)abdominal drain to gravity (400 cc/1225 cc)  Extrem: WWP, no edema, SCDs in place        LABS:                        9.3    11.81 )-----------( 350      ( 04 Aug 2023 07:11 )             30.0     08-04    137  |  103  |  25<H>  ----------------------------<  91  4.0   |  26  |  0.66    Ca    7.7<L>      04 Aug 2023 07:11  Phos  3.6     08-04  Mg     2.0     08-04    TPro  7.9  /  Alb  2.2<L>  /  TBili  3.6<H>  /  DBili  2.4<H>  /  AST  130<H>  /  ALT  103<H>  /  AlkPhos  171<H>  08-04      Urinalysis Basic - ( 04 Aug 2023 07:11 )    Color: x / Appearance: x / SG: x / pH: x  Gluc: 91 mg/dL / Ketone: x  / Bili: x / Urobili: x   Blood: x / Protein: x / Nitrite: x   Leuk Esterase: x / RBC: x / WBC x   Sq Epi: x / Non Sq Epi: x / Bacteria: x        RADIOLOGY & ADDITIONAL STUDIES:

## 2023-08-04 NOTE — PROGRESS NOTE ADULT - SUBJECTIVE AND OBJECTIVE BOX
Pt seen and examined   no complaints  for dresings change today    REVIEW OF SYSTEMS:  Constitutional: No fever,   Cardiovascular: No chest pain, palpitations,   Gastrointestinal: No abdominal or epigastric pain. No nausea, vomiting   Skin: No itching, burning, rashes or lesions       MEDICATIONS:  MEDICATIONS  (STANDING):  aMIOdarone    Tablet 200 milliGRAM(s) Oral daily  amLODIPine   Tablet 10 milliGRAM(s) Oral daily  atorvastatin 20 milliGRAM(s) Oral at bedtime  chlorhexidine 2% Cloths 1 Application(s) Topical <User Schedule>  cholestyramine Powder (Sugar-Free) 4 Gram(s) Oral two times a day  glucagon  Injectable 1 milliGRAM(s) IntraMuscular once  heparin   Injectable 5000 Unit(s) SubCutaneous every 8 hours  levothyroxine 50 MICROGram(s) Oral daily  lipid, fat emulsion (Fish Oil and Plant Based) 20% Infusion 0.5 Gm/kG/Day (20.83 mL/Hr) IV Continuous <Continuous>  losartan 25 milliGRAM(s) Oral daily  metoprolol tartrate 25 milliGRAM(s) Oral every 12 hours  nystatin Powder 1 Application(s) Topical three times a day  octreotide  Injectable 100 MICROGram(s) IV Push every 8 hours  pantoprazole  Injectable 40 milliGRAM(s) IV Push daily  Parenteral Nutrition - Adult 1 Each (96 mL/Hr) TPN Continuous <Continuous>  venlafaxine XR. 300 milliGRAM(s) Oral daily    MEDICATIONS  (PRN):  benzocaine/menthol Lozenge 2 Lozenge Oral every 4 hours PRN Sore Throat  melatonin 5 milliGRAM(s) Oral at bedtime PRN Sleep      Allergies    penicillin (Angioedema)    Intolerances        Vital Signs Last 24 Hrs  T(C): 36.9 (04 Aug 2023 05:00), Max: 36.9 (04 Aug 2023 05:00)  T(F): 98.4 (04 Aug 2023 05:00), Max: 98.4 (04 Aug 2023 05:00)  HR: 84 (04 Aug 2023 08:55) (84 - 94)  BP: 139/67 (04 Aug 2023 08:55) (126/61 - 157/75)  BP(mean): 96 (04 Aug 2023 08:55) (88 - 108)  RR: 18 (04 Aug 2023 08:55) (18 - 18)  SpO2: 96% (04 Aug 2023 08:55) (95% - 100%)    Parameters below as of 04 Aug 2023 08:55  Patient On (Oxygen Delivery Method): room air        08-03 @ 07:01 - 08-04 @ 07:00  --------------------------------------------------------  IN: 2456.6 mL / OUT: 3335 mL / NET: -878.4 mL    08-04 @ 07:01 - 08-04 @ 09:34  --------------------------------------------------------  IN: 96 mL / OUT: 0 mL / NET: 96 mL        PHYSICAL EXAM:    General:  in no acute distress  HEENT: MMM, conjunctiva and sclera clear  Gastrointestinal: non-distended; Normal bowel sounds; ileostomy  Skin: Warm and dry. No obvious rash    LABS:      CBC Full  -  ( 04 Aug 2023 07:11 )  WBC Count : 11.81 K/uL  RBC Count : 3.10 M/uL  Hemoglobin : 9.3 g/dL  Hematocrit : 30.0 %  Platelet Count - Automated : 350 K/uL  Mean Cell Volume : 96.8 fl  Mean Cell Hemoglobin : 30.0 pg  Mean Cell Hemoglobin Concentration : 31.0 gm/dL  Auto Neutrophil # : x  Auto Lymphocyte # : x  Auto Monocyte # : x  Auto Eosinophil # : x  Auto Basophil # : x  Auto Neutrophil % : x  Auto Lymphocyte % : x  Auto Monocyte % : x  Auto Eosinophil % : x  Auto Basophil % : x    08-04    137  |  103  |  25<H>  ----------------------------<  91  4.0   |  26  |  0.66    Ca    7.7<L>      04 Aug 2023 07:11  Phos  3.6     08-04  Mg     2.0     08-04    TPro  7.9  /  Alb  2.2<L>  /  TBili  3.6<H>  /  DBili  2.4<H>  /  AST  130<H>  /  ALT  103<H>  /  AlkPhos  171<H>  08-04          Urinalysis Basic - ( 04 Aug 2023 07:11 )    Color: x / Appearance: x / SG: x / pH: x  Gluc: 91 mg/dL / Ketone: x  / Bili: x / Urobili: x   Blood: x / Protein: x / Nitrite: x   Leuk Esterase: x / RBC: x / WBC x   Sq Epi: x / Non Sq Epi: x / Bacteria: x                RADIOLOGY & ADDITIONAL STUDIES (The following images were personally reviewed):

## 2023-08-05 NOTE — PROGRESS NOTE ADULT - SUBJECTIVE AND OBJECTIVE BOX
Pt seen and examined   dressings changed yesterday  npo except ice chips    REVIEW OF SYSTEMS:  Constitutional: No fever,   Cardiovascular: No chest pain, palpitations, dizziness   Gastrointestinal: No abdominal or epigastric pain. No nausea, vomiting  Skin: No itching, burning, rashes or lesions       MEDICATIONS:  MEDICATIONS  (STANDING):  aMIOdarone    Tablet 200 milliGRAM(s) Oral daily  amLODIPine   Tablet 10 milliGRAM(s) Oral daily  atorvastatin 20 milliGRAM(s) Oral at bedtime  chlorhexidine 2% Cloths 1 Application(s) Topical <User Schedule>  cholestyramine Powder (Sugar-Free) 4 Gram(s) Oral two times a day  glucagon  Injectable 1 milliGRAM(s) IntraMuscular once  heparin   Injectable 5000 Unit(s) SubCutaneous every 8 hours  levothyroxine 50 MICROGram(s) Oral daily  lidocaine 1% Injectable 20 milliLiter(s) Local Injection once  lipid, fat emulsion (Fish Oil and Plant Based) 20% Infusion 0.495 Gm/kG/Day (20.83 mL/Hr) IV Continuous <Continuous>  losartan 25 milliGRAM(s) Oral daily  metoprolol tartrate 25 milliGRAM(s) Oral every 12 hours  nystatin Powder 1 Application(s) Topical three times a day  octreotide  Injectable 100 MICROGram(s) IV Push every 8 hours  pantoprazole  Injectable 40 milliGRAM(s) IV Push daily  Parenteral Nutrition - Adult 1 Each (96 mL/Hr) TPN Continuous <Continuous>  Parenteral Nutrition - Adult 1 Each (96 mL/Hr) TPN Continuous <Continuous>  venlafaxine XR. 300 milliGRAM(s) Oral daily    MEDICATIONS  (PRN):  benzocaine/menthol Lozenge 2 Lozenge Oral every 4 hours PRN Sore Throat  melatonin 5 milliGRAM(s) Oral at bedtime PRN Sleep      Allergies    penicillin (Angioedema)    Intolerances        Vital Signs Last 24 Hrs  T(C): 36.7 (05 Aug 2023 14:01), Max: 36.7 (05 Aug 2023 04:59)  T(F): 98.1 (05 Aug 2023 14:01), Max: 98.1 (05 Aug 2023 09:20)  HR: 86 (05 Aug 2023 11:28) (82 - 86)  BP: 153/73 (05 Aug 2023 11:28) (127/62 - 160/74)  BP(mean): 105 (05 Aug 2023 11:28) (88 - 106)  RR: 20 (05 Aug 2023 11:28) (16 - 20)  SpO2: 96% (05 Aug 2023 11:28) (93% - 98%)    Parameters below as of 05 Aug 2023 11:28  Patient On (Oxygen Delivery Method): room air        08-04 @ 07:01  -  08-05 @ 07:00  --------------------------------------------------------  IN: 3263.6 mL / OUT: 2637 mL / NET: 626.6 mL    08-05 @ 07:01  -  08-05 @ 14:43  --------------------------------------------------------  IN: 384 mL / OUT: 1572.5 mL / NET: -1188.5 mL        PHYSICAL EXAM:    General:  in no acute distress  HEENT: MMM, conjunctiva and sclera clear  Gastrointestinal:  non-distended; Normal bowel sounds; dressings, ileostomy, mucus fistula  Skin: Warm and dry. No obvious rash    LABS:      CBC Full  -  ( 05 Aug 2023 06:56 )  WBC Count : 10.77 K/uL  RBC Count : 3.19 M/uL  Hemoglobin : 9.7 g/dL  Hematocrit : 30.4 %  Platelet Count - Automated : 332 K/uL  Mean Cell Volume : 95.3 fl  Mean Cell Hemoglobin : 30.4 pg  Mean Cell Hemoglobin Concentration : 31.9 gm/dL  Auto Neutrophil # : x  Auto Lymphocyte # : x  Auto Monocyte # : x  Auto Eosinophil # : x  Auto Basophil # : x  Auto Neutrophil % : x  Auto Lymphocyte % : x  Auto Monocyte % : x  Auto Eosinophil % : x  Auto Basophil % : x    08-05    137  |  105  |  31<H>  ----------------------------<  114<H>  3.9   |  26  |  0.64    Ca    7.9<L>      05 Aug 2023 06:56  Phos  3.4     08-05  Mg     1.9     08-05    TPro  7.8  /  Alb  1.9<L>  /  TBili  3.4<H>  /  DBili  2.3<H>  /  AST  109<H>  /  ALT  98<H>  /  AlkPhos  168<H>  08-05          Urinalysis Basic - ( 05 Aug 2023 06:56 )    Color: x / Appearance: x / SG: x / pH: x  Gluc: 114 mg/dL / Ketone: x  / Bili: x / Urobili: x   Blood: x / Protein: x / Nitrite: x   Leuk Esterase: x / RBC: x / WBC x   Sq Epi: x / Non Sq Epi: x / Bacteria: x                RADIOLOGY & ADDITIONAL STUDIES (The following images were personally reviewed):

## 2023-08-05 NOTE — PROGRESS NOTE ADULT - ASSESSMENT
78M w/ Crohn's, AFib/Flutter s/p DCCVs on amiodarone, remote ileocectomy and open appy here for SBO vs Crohns flare, s/p NGT decompression and now s/p lap TRE converted to open TRE, SBR x 3, left in discontinuity with abthera vac on 6/27, RTOR for ileocolic resection, small bowel anastomosis, and abdominal wall closure on 6/28, and s/p IR aspiration of perihepatic fluid on 7/3, stepped down to telemetery on 7/4. wound dehisence on 7/5, RTOR exlap, washout, ileocolic resection with end ileostomy, blow hole colostomy, red rubber from ileostomy to small bowel anastomosis; vicryl bridging mesh on 7/5. Transferred to SICU post op for HD monitoring. Extubated 7/6. SDU 8/2    NPO w. sips/ TPN  Pain/Nausea control  Home; venlafaxine, losartan, rosuvastatin,synthroid  Wound vac, fistula drain (to suction)  amiodarone, metoprolol  SCD/SQH  Wound vac changed 8/4; L JOYCE at small bowel enterotomy repair - to LIWS.

## 2023-08-05 NOTE — PROGRESS NOTE ADULT - ASSESSMENT
78M w/ Crohn's, AFib/Flutter s/p DCCVs on amiodarone, remote ileocectomy and open appy here for SBO vs Crohns flare, s/p NGT decompression and now s/p lap TRE converted to open TRE, SBR x 3, left in discontinuity with abthera vac on 6/27, RTOR for ileocolic resection, small bowel anastomosis, and abdominal wall closure on 6/28, and s/p IR aspiration of perihepatic fluid on 7/3, stepped down to telemetry on 7/4. wound dehiscence on 7/5, RTOR ex-lap, washout, ileocolic resection with end ileostomy, blow hole colostomy, red rubber from ileostomy to small bowel anastomosis; Vicryl bridging mesh on 7/5. Transferred to SICU post op for HD monitoring. Extubated 7/6.   Decreased fistula output. Mild leukocytosis. Transaminitis improving    continue vac mgmt   OOBTC   care per primary team

## 2023-08-05 NOTE — PROGRESS NOTE ADULT - SUBJECTIVE AND OBJECTIVE BOX
Attending Surgeon Progress Note (Covering for Dr. Dr. Pteerson)    Awake . Alert.    ABd- VAC in place with fistula pouch intact ( aprox 1 liter/24 hours)      LABS:                        9.7    10.77 )-----------( 332      ( 05 Aug 2023 06:56 )             30.4     08-05    137  |  105  |  31<H>  ----------------------------<  114<H>  3.9   |  26  |  0.64    Ca    7.9<L>      05 Aug 2023 06:56  Phos  3.4     08-05  Mg     1.9     08-05    TPro  7.8  /  Alb  1.9<L>  /  TBili  3.4<H>  /  DBili  2.3<H>  /  AST  109<H>  /  ALT  98<H>  /  AlkPhos  168<H>  08-05      A/P EC fisutla.   Stable  COnt vac/pouching system.  TPN  Ambulate/ PT

## 2023-08-05 NOTE — PROGRESS NOTE ADULT - SUBJECTIVE AND OBJECTIVE BOX
Status Post:  6/27: Laparoscopic lysis of adhesions, converted to open lysis of adhesions, SBR x 3, temporary abdominal closure  6/28: ex lap, removal of abthera, ileocolic resection, small bowel anastamosis  7/3: IR Ria drained perihepatic aspiration of serous fluid.   7/5: RTOR exlap, washout, ileocolic resection with end ileostomy, blow hole colostomy, fistula, red rubber from ileostomy to small bowel anastomosis; vicryl bridging mesh; R JOYCE below ileostomy, L JOYCE at small bowel enterotomy repair    SUBJECTIVE: Patient was seen and examined by chief resident. Patient resting comfortably in bed with no acute complaints.       Vital Signs Last 24 Hrs  T(C): 36.3 (05 Aug 2023 18:27), Max: 36.7 (05 Aug 2023 04:59)  T(F): 97.4 (05 Aug 2023 18:27), Max: 98.1 (05 Aug 2023 09:20)  HR: 84 (05 Aug 2023 16:41) (82 - 86)  BP: 143/69 (05 Aug 2023 16:41) (139/69 - 153/73)  BP(mean): 98 (05 Aug 2023 16:41) (98 - 106)  RR: 19 (05 Aug 2023 16:41) (16 - 20)  SpO2: 97% (05 Aug 2023 16:41) (95% - 98%)    Parameters below as of 05 Aug 2023 16:41  Patient On (Oxygen Delivery Method): room air        I&O's Summary    04 Aug 2023 07:01  -  05 Aug 2023 07:00  --------------------------------------------------------  IN: 3263.6 mL / OUT: 2637 mL / NET: 626.6 mL    05 Aug 2023 07:01  -  05 Aug 2023 20:18  --------------------------------------------------------  IN: 1214.4 mL / OUT: 2027.5 mL / NET: -813.1 mL        Physical Exam:  General Appearance: Appears well, NAD  Pulmonary: Nonlabored breathing, no respiratory distress  Cardiovascular: NSR  Abdomen: Soft, NT, ostomy PPP minimal output, wound vac w no leak, EC fistula drain w bilious output  Extremities: WWP, SCD's in place     LABS:                        9.7    10.77 )-----------( 332      ( 05 Aug 2023 06:56 )             30.4     08-05    137  |  105  |  31<H>  ----------------------------<  114<H>  3.9   |  26  |  0.64    Ca    7.9<L>      05 Aug 2023 06:56  Phos  3.4     08-05  Mg     1.9     08-05    TPro  7.8  /  Alb  1.9<L>  /  TBili  3.4<H>  /  DBili  2.3<H>  /  AST  109<H>  /  ALT  98<H>  /  AlkPhos  168<H>  08-05      Urinalysis Basic - ( 05 Aug 2023 06:56 )    Color: x / Appearance: x / SG: x / pH: x  Gluc: 114 mg/dL / Ketone: x  / Bili: x / Urobili: x   Blood: x / Protein: x / Nitrite: x   Leuk Esterase: x / RBC: x / WBC x   Sq Epi: x / Non Sq Epi: x / Bacteria: x

## 2023-08-05 NOTE — PROGRESS NOTE ADULT - SUBJECTIVE AND OBJECTIVE BOX
SUBJECTIVE:      MEDICATIONS  (STANDING):  aMIOdarone    Tablet 200 milliGRAM(s) Oral daily  amLODIPine   Tablet 10 milliGRAM(s) Oral daily  atorvastatin 20 milliGRAM(s) Oral at bedtime  chlorhexidine 2% Cloths 1 Application(s) Topical <User Schedule>  cholestyramine Powder (Sugar-Free) 4 Gram(s) Oral two times a day  glucagon  Injectable 1 milliGRAM(s) IntraMuscular once  heparin   Injectable 5000 Unit(s) SubCutaneous every 8 hours  levothyroxine 50 MICROGram(s) Oral daily  lidocaine 1% Injectable 20 milliLiter(s) Local Injection once  lipid, fat emulsion (Fish Oil and Plant Based) 20% Infusion 0.495 Gm/kG/Day (20.83 mL/Hr) IV Continuous <Continuous>  losartan 25 milliGRAM(s) Oral daily  metoprolol tartrate 25 milliGRAM(s) Oral every 12 hours  nystatin Powder 1 Application(s) Topical three times a day  octreotide  Injectable 100 MICROGram(s) IV Push every 8 hours  pantoprazole  Injectable 40 milliGRAM(s) IV Push daily  Parenteral Nutrition - Adult 1 Each (96 mL/Hr) TPN Continuous <Continuous>  Parenteral Nutrition - Adult 1 Each (96 mL/Hr) TPN Continuous <Continuous>  venlafaxine XR. 300 milliGRAM(s) Oral daily    MEDICATIONS  (PRN):  benzocaine/menthol Lozenge 2 Lozenge Oral every 4 hours PRN Sore Throat  melatonin 5 milliGRAM(s) Oral at bedtime PRN Sleep      Vital Signs Last 24 Hrs  T(C): 36.8 (05 Aug 2023 22:32), Max: 36.8 (05 Aug 2023 22:32)  T(F): 98.3 (05 Aug 2023 22:32), Max: 98.3 (05 Aug 2023 22:32)  HR: 84 (05 Aug 2023 20:34) (82 - 86)  BP: 145/70 (05 Aug 2023 20:34) (139/69 - 153/73)  BP(mean): 100 (05 Aug 2023 20:34) (98 - 106)  RR: 19 (05 Aug 2023 16:41) (16 - 20)  SpO2: 95% (05 Aug 2023 20:34) (95% - 98%)    Parameters below as of 05 Aug 2023 16:41  Patient On (Oxygen Delivery Method): room air        Physical Exam:  General: NAD, resting comfortably in bed  Pulmonary: Nonlabored breathing, no respiratory distress  Cardiovascular: NSR  Abdominal: soft, vac in place w/ visible suction, JOYCE drains in place   Extremities: WWP, normal strength  Neuro: A/O x 3, CNs II-XII grossly intact, no focal deficits    I&O's Summary    04 Aug 2023 07:01  -  05 Aug 2023 07:00  --------------------------------------------------------  IN: 3263.6 mL / OUT: 2637 mL / NET: 626.6 mL    05 Aug 2023 07:01  -  05 Aug 2023 23:01  --------------------------------------------------------  IN: 1214.4 mL / OUT: 2275.5 mL / NET: -1061.1 mL        LABS:                        9.7    10.77 )-----------( 332      ( 05 Aug 2023 06:56 )             30.4     08-05    137  |  105  |  31<H>  ----------------------------<  114<H>  3.9   |  26  |  0.64    Ca    7.9<L>      05 Aug 2023 06:56  Phos  3.4     08-05  Mg     1.9     08-05    TPro  7.8  /  Alb  1.9<L>  /  TBili  3.4<H>  /  DBili  2.3<H>  /  AST  109<H>  /  ALT  98<H>  /  AlkPhos  168<H>  08-05      Urinalysis Basic - ( 05 Aug 2023 06:56 )    Color: x / Appearance: x / SG: x / pH: x  Gluc: 114 mg/dL / Ketone: x  / Bili: x / Urobili: x   Blood: x / Protein: x / Nitrite: x   Leuk Esterase: x / RBC: x / WBC x   Sq Epi: x / Non Sq Epi: x / Bacteria: x      CAPILLARY BLOOD GLUCOSE        LIVER FUNCTIONS - ( 05 Aug 2023 06:56 )  Alb: 1.9 g/dL / Pro: 7.8 g/dL / ALK PHOS: 168 U/L / ALT: 98 U/L / AST: 109 U/L / GGT: x             RADIOLOGY & ADDITIONAL STUDIES:   SUBJECTIVE:  Pt seen at bedside during the day. Pt denies nausea/vomiting, CP, SOB.     MEDICATIONS  (STANDING):  aMIOdarone    Tablet 200 milliGRAM(s) Oral daily  amLODIPine   Tablet 10 milliGRAM(s) Oral daily  atorvastatin 20 milliGRAM(s) Oral at bedtime  chlorhexidine 2% Cloths 1 Application(s) Topical <User Schedule>  cholestyramine Powder (Sugar-Free) 4 Gram(s) Oral two times a day  glucagon  Injectable 1 milliGRAM(s) IntraMuscular once  heparin   Injectable 5000 Unit(s) SubCutaneous every 8 hours  levothyroxine 50 MICROGram(s) Oral daily  lidocaine 1% Injectable 20 milliLiter(s) Local Injection once  lipid, fat emulsion (Fish Oil and Plant Based) 20% Infusion 0.495 Gm/kG/Day (20.83 mL/Hr) IV Continuous <Continuous>  losartan 25 milliGRAM(s) Oral daily  metoprolol tartrate 25 milliGRAM(s) Oral every 12 hours  nystatin Powder 1 Application(s) Topical three times a day  octreotide  Injectable 100 MICROGram(s) IV Push every 8 hours  pantoprazole  Injectable 40 milliGRAM(s) IV Push daily  Parenteral Nutrition - Adult 1 Each (96 mL/Hr) TPN Continuous <Continuous>  Parenteral Nutrition - Adult 1 Each (96 mL/Hr) TPN Continuous <Continuous>  venlafaxine XR. 300 milliGRAM(s) Oral daily    MEDICATIONS  (PRN):  benzocaine/menthol Lozenge 2 Lozenge Oral every 4 hours PRN Sore Throat  melatonin 5 milliGRAM(s) Oral at bedtime PRN Sleep      Vital Signs Last 24 Hrs  T(C): 36.8 (05 Aug 2023 22:32), Max: 36.8 (05 Aug 2023 22:32)  T(F): 98.3 (05 Aug 2023 22:32), Max: 98.3 (05 Aug 2023 22:32)  HR: 84 (05 Aug 2023 20:34) (82 - 86)  BP: 145/70 (05 Aug 2023 20:34) (139/69 - 153/73)  BP(mean): 100 (05 Aug 2023 20:34) (98 - 106)  RR: 19 (05 Aug 2023 16:41) (16 - 20)  SpO2: 95% (05 Aug 2023 20:34) (95% - 98%)    Parameters below as of 05 Aug 2023 16:41  Patient On (Oxygen Delivery Method): room air        Physical Exam:  General: NAD, resting comfortably in bed  Pulmonary: Nonlabored breathing, no respiratory distress  Cardiovascular: NSR  Abdominal: soft, vac in place w/ visible suction, JOYCE drains in place   Extremities: WWP, normal strength  Neuro: A/O x 3, CNs II-XII grossly intact, no focal deficits    I&O's Summary    04 Aug 2023 07:01  -  05 Aug 2023 07:00  --------------------------------------------------------  IN: 3263.6 mL / OUT: 2637 mL / NET: 626.6 mL    05 Aug 2023 07:01  -  05 Aug 2023 23:01  --------------------------------------------------------  IN: 1214.4 mL / OUT: 2275.5 mL / NET: -1061.1 mL        LABS:                        9.7    10.77 )-----------( 332      ( 05 Aug 2023 06:56 )             30.4     08-05    137  |  105  |  31<H>  ----------------------------<  114<H>  3.9   |  26  |  0.64    Ca    7.9<L>      05 Aug 2023 06:56  Phos  3.4     08-05  Mg     1.9     08-05    TPro  7.8  /  Alb  1.9<L>  /  TBili  3.4<H>  /  DBili  2.3<H>  /  AST  109<H>  /  ALT  98<H>  /  AlkPhos  168<H>  08-05      Urinalysis Basic - ( 05 Aug 2023 06:56 )    Color: x / Appearance: x / SG: x / pH: x  Gluc: 114 mg/dL / Ketone: x  / Bili: x / Urobili: x   Blood: x / Protein: x / Nitrite: x   Leuk Esterase: x / RBC: x / WBC x   Sq Epi: x / Non Sq Epi: x / Bacteria: x      CAPILLARY BLOOD GLUCOSE        LIVER FUNCTIONS - ( 05 Aug 2023 06:56 )  Alb: 1.9 g/dL / Pro: 7.8 g/dL / ALK PHOS: 168 U/L / ALT: 98 U/L / AST: 109 U/L / GGT: x             RADIOLOGY & ADDITIONAL STUDIES:

## 2023-08-06 NOTE — PROGRESS NOTE ADULT - SUBJECTIVE AND OBJECTIVE BOX
INTERVAL HPI/OVERNIGHT EVENTS:    STATUS POST:      POST OPERATIVE DAY #:     SUBJECTIVE:      MEDICATIONS  (STANDING):  aMIOdarone    Tablet 200 milliGRAM(s) Oral daily  amLODIPine   Tablet 10 milliGRAM(s) Oral daily  atorvastatin 20 milliGRAM(s) Oral at bedtime  chlorhexidine 2% Cloths 1 Application(s) Topical <User Schedule>  cholestyramine Powder (Sugar-Free) 4 Gram(s) Oral two times a day  glucagon  Injectable 1 milliGRAM(s) IntraMuscular once  heparin   Injectable 5000 Unit(s) SubCutaneous every 8 hours  levothyroxine 50 MICROGram(s) Oral daily  lidocaine 1% Injectable 20 milliLiter(s) Local Injection once  lipid, fat emulsion (Fish Oil and Plant Based) 20% Infusion 0.495 Gm/kG/Day (20.8 mL/Hr) IV Continuous <Continuous>  losartan 25 milliGRAM(s) Oral daily  metoprolol tartrate 25 milliGRAM(s) Oral every 12 hours  nystatin Powder 1 Application(s) Topical three times a day  octreotide  Injectable 100 MICROGram(s) IV Push every 8 hours  pantoprazole  Injectable 40 milliGRAM(s) IV Push daily  Parenteral Nutrition - Adult 1 Each (96 mL/Hr) TPN Continuous <Continuous>  Parenteral Nutrition - Adult 1 Each (96 mL/Hr) TPN Continuous <Continuous>  venlafaxine XR. 300 milliGRAM(s) Oral daily    MEDICATIONS  (PRN):  benzocaine/menthol Lozenge 2 Lozenge Oral every 4 hours PRN Sore Throat  melatonin 5 milliGRAM(s) Oral at bedtime PRN Sleep      Vital Signs Last 24 Hrs  T(C): 36.9 (06 Aug 2023 10:00), Max: 36.9 (06 Aug 2023 04:54)  T(F): 98.5 (06 Aug 2023 10:00), Max: 98.5 (06 Aug 2023 04:54)  HR: 86 (06 Aug 2023 08:35) (84 - 86)  BP: 134/61 (06 Aug 2023 08:35) (134/61 - 162/74)  BP(mean): 91 (06 Aug 2023 08:35) (91 - 107)  RR: 18 (06 Aug 2023 08:35) (17 - 20)  SpO2: 96% (06 Aug 2023 08:35) (94% - 97%)    Parameters below as of 06 Aug 2023 08:35  Patient On (Oxygen Delivery Method): room air        PHYSICAL EXAM:      Constitutional: A&Ox3    Breasts:    Respiratory: non labored breathing, no respiratory distress    Cardiovascular: NSR, RRR    Gastrointestinal:                 Incision:    Genitourinary:    Extremities: (-) edema                  I&O's Detail    05 Aug 2023 07:01  -  06 Aug 2023 07:00  --------------------------------------------------------  IN:    Fat Emulsion 20%: 62.4 mL    TPN (Total Parenteral Nutrition): 1152 mL  Total IN: 1214.4 mL    OUT:    Bulb (mL): 18.5 mL    Drain (mL): 270 mL    Drain (mL): 100 mL    Ileostomy (mL): 50 mL    VAC (Vacuum Assisted Closure) System (mL): 525 mL    Voided (mL): 2185 mL  Total OUT: 3148.5 mL    Total NET: -1934.1 mL      06 Aug 2023 07:01  -  06 Aug 2023 10:19  --------------------------------------------------------  IN:  Total IN: 0 mL    OUT:    Voided (mL): 100 mL  Total OUT: 100 mL    Total NET: -100 mL          LABS:                        9.8    11.25 )-----------( 311      ( 06 Aug 2023 05:30 )             31.2     08-05    137  |  105  |  31<H>  ----------------------------<  114<H>  3.9   |  26  |  0.64    Ca    7.9<L>      05 Aug 2023 06:56  Phos  3.6     08-06  Mg     2.0     08-06    TPro  7.7  /  Alb  2.1<L>  /  TBili  3.6<H>  /  DBili  2.5<H>  /  AST  108<H>  /  ALT  94<H>  /  AlkPhos  168<H>  08-06      Urinalysis Basic - ( 05 Aug 2023 06:56 )    Color: x / Appearance: x / SG: x / pH: x  Gluc: 114 mg/dL / Ketone: x  / Bili: x / Urobili: x   Blood: x / Protein: x / Nitrite: x   Leuk Esterase: x / RBC: x / WBC x   Sq Epi: x / Non Sq Epi: x / Bacteria: x        RADIOLOGY & ADDITIONAL STUDIES: INTERVAL HPI/OVERNIGHT EVENTS: red rubber fell out of fistula, top bag replaced    STATUS POST:    6/28: ex lap, removal of abthera, ileocolic resection, small bowel anastamosis, , 1.6 crystalloid, 250 5%, 175 uop   7/3: IR Gendel drained perihepatic aspiration of serous fluid.   7/5: RTOR exlap, washout, ileocolic resection with end ileostomy, blow hole colostomy, fistula, red rubber from ileostomy to small bowel anastomosis; vicryl bridging mesh; R JOYCE below ileostomy, L JOYCE at small bowel enterotomy repair; 500 LR, 500 5% albumin, 3u PRBC, 2 FFP, 400 UOP,         SUBJECTIVE:Patient seen and examined bedside by chief resident. No acute complaints at this time. Expresses better pain control on exam today.      MEDICATIONS  (STANDING):  aMIOdarone    Tablet 200 milliGRAM(s) Oral daily  amLODIPine   Tablet 10 milliGRAM(s) Oral daily  atorvastatin 20 milliGRAM(s) Oral at bedtime  chlorhexidine 2% Cloths 1 Application(s) Topical <User Schedule>  cholestyramine Powder (Sugar-Free) 4 Gram(s) Oral two times a day  glucagon  Injectable 1 milliGRAM(s) IntraMuscular once  heparin   Injectable 5000 Unit(s) SubCutaneous every 8 hours  levothyroxine 50 MICROGram(s) Oral daily  lidocaine 1% Injectable 20 milliLiter(s) Local Injection once  lipid, fat emulsion (Fish Oil and Plant Based) 20% Infusion 0.495 Gm/kG/Day (20.8 mL/Hr) IV Continuous <Continuous>  losartan 25 milliGRAM(s) Oral daily  metoprolol tartrate 25 milliGRAM(s) Oral every 12 hours  nystatin Powder 1 Application(s) Topical three times a day  octreotide  Injectable 100 MICROGram(s) IV Push every 8 hours  pantoprazole  Injectable 40 milliGRAM(s) IV Push daily  Parenteral Nutrition - Adult 1 Each (96 mL/Hr) TPN Continuous <Continuous>  Parenteral Nutrition - Adult 1 Each (96 mL/Hr) TPN Continuous <Continuous>  venlafaxine XR. 300 milliGRAM(s) Oral daily    MEDICATIONS  (PRN):  benzocaine/menthol Lozenge 2 Lozenge Oral every 4 hours PRN Sore Throat  melatonin 5 milliGRAM(s) Oral at bedtime PRN Sleep      Vital Signs Last 24 Hrs  T(C): 36.9 (06 Aug 2023 10:00), Max: 36.9 (06 Aug 2023 04:54)  T(F): 98.5 (06 Aug 2023 10:00), Max: 98.5 (06 Aug 2023 04:54)  HR: 86 (06 Aug 2023 08:35) (84 - 86)  BP: 134/61 (06 Aug 2023 08:35) (134/61 - 162/74)  BP(mean): 91 (06 Aug 2023 08:35) (91 - 107)  RR: 18 (06 Aug 2023 08:35) (17 - 20)  SpO2: 96% (06 Aug 2023 08:35) (94% - 97%)    Parameters below as of 06 Aug 2023 08:35  Patient On (Oxygen Delivery Method): room air        PHYSICAL EXAM:  General: NAD, resting comfortably in bed  C/V: NSR  Pulm: Nonlabored breathing, no respiratory distress  Abd: soft, NT/ND. No rebound or guarding  Abdominal: soft, vac with visible suction, JOYCE drain left abdomen, EC fistula drain (0 cc/75 cc)abdominal drain to gravity (400 cc/1225 cc)  Extrem: WWP, no edema, SCDs in place                I&O's Detail    05 Aug 2023 07:01  -  06 Aug 2023 07:00  --------------------------------------------------------  IN:    Fat Emulsion 20%: 62.4 mL    TPN (Total Parenteral Nutrition): 1152 mL  Total IN: 1214.4 mL    OUT:    Bulb (mL): 18.5 mL    Drain (mL): 270 mL    Drain (mL): 100 mL    Ileostomy (mL): 50 mL    VAC (Vacuum Assisted Closure) System (mL): 525 mL    Voided (mL): 2185 mL  Total OUT: 3148.5 mL    Total NET: -1934.1 mL      06 Aug 2023 07:01  -  06 Aug 2023 10:19  --------------------------------------------------------  IN:  Total IN: 0 mL    OUT:    Voided (mL): 100 mL  Total OUT: 100 mL    Total NET: -100 mL          LABS:                        9.8    11.25 )-----------( 311      ( 06 Aug 2023 05:30 )             31.2     08-05    137  |  105  |  31<H>  ----------------------------<  114<H>  3.9   |  26  |  0.64    Ca    7.9<L>      05 Aug 2023 06:56  Phos  3.6     08-06  Mg     2.0     08-06    TPro  7.7  /  Alb  2.1<L>  /  TBili  3.6<H>  /  DBili  2.5<H>  /  AST  108<H>  /  ALT  94<H>  /  AlkPhos  168<H>  08-06      Urinalysis Basic - ( 05 Aug 2023 06:56 )    Color: x / Appearance: x / SG: x / pH: x  Gluc: 114 mg/dL / Ketone: x  / Bili: x / Urobili: x   Blood: x / Protein: x / Nitrite: x   Leuk Esterase: x / RBC: x / WBC x   Sq Epi: x / Non Sq Epi: x / Bacteria: x        RADIOLOGY & ADDITIONAL STUDIES:

## 2023-08-06 NOTE — PROGRESS NOTE ADULT - SUBJECTIVE AND OBJECTIVE BOX
Pt seen and examined   walked to chair    REVIEW OF SYSTEMS:  Constitutional: No fever,   Cardiovascular: No chest pain, palpitations,  Gastrointestinal: No abdominal or epigastric pain. No nausea, vomiting  Skin: No itching, burning, rashes or lesions       MEDICATIONS:  MEDICATIONS  (STANDING):  aMIOdarone    Tablet 200 milliGRAM(s) Oral daily  amLODIPine   Tablet 10 milliGRAM(s) Oral daily  atorvastatin 20 milliGRAM(s) Oral at bedtime  chlorhexidine 2% Cloths 1 Application(s) Topical <User Schedule>  cholestyramine Powder (Sugar-Free) 4 Gram(s) Oral two times a day  glucagon  Injectable 1 milliGRAM(s) IntraMuscular once  heparin   Injectable 5000 Unit(s) SubCutaneous every 8 hours  levothyroxine 50 MICROGram(s) Oral daily  lidocaine 1% Injectable 20 milliLiter(s) Local Injection once  lipid, fat emulsion (Fish Oil and Plant Based) 20% Infusion 0.495 Gm/kG/Day (20.83 mL/Hr) IV Continuous <Continuous>  losartan 25 milliGRAM(s) Oral daily  metoprolol tartrate 25 milliGRAM(s) Oral every 12 hours  nystatin Powder 1 Application(s) Topical three times a day  octreotide  Injectable 100 MICROGram(s) IV Push every 8 hours  pantoprazole  Injectable 40 milliGRAM(s) IV Push daily  Parenteral Nutrition - Adult 1 Each (96 mL/Hr) TPN Continuous <Continuous>  Parenteral Nutrition - Adult 1 Each (96 mL/Hr) TPN Continuous <Continuous>  venlafaxine XR. 300 milliGRAM(s) Oral daily    MEDICATIONS  (PRN):  benzocaine/menthol Lozenge 2 Lozenge Oral every 4 hours PRN Sore Throat  melatonin 5 milliGRAM(s) Oral at bedtime PRN Sleep      Allergies    penicillin (Angioedema)    Intolerances        Vital Signs Last 24 Hrs  T(C): 36.9 (06 Aug 2023 10:00), Max: 36.9 (06 Aug 2023 04:54)  T(F): 98.5 (06 Aug 2023 10:00), Max: 98.5 (06 Aug 2023 04:54)  HR: 84 (06 Aug 2023 11:45) (84 - 86)  BP: 129/60 (06 Aug 2023 11:45) (129/60 - 162/74)  BP(mean): 87 (06 Aug 2023 11:45) (87 - 107)  RR: 16 (06 Aug 2023 11:45) (16 - 19)  SpO2: 97% (06 Aug 2023 11:45) (94% - 97%)    Parameters below as of 06 Aug 2023 11:45  Patient On (Oxygen Delivery Method): room air        08-05 @ 07:01  -  08-06 @ 07:00  --------------------------------------------------------  IN: 1214.4 mL / OUT: 3148.5 mL / NET: -1934.1 mL    08-06 @ 07:01  -  08-06 @ 12:11  --------------------------------------------------------  IN: 480 mL / OUT: 620 mL / NET: -140 mL        PHYSICAL EXAM:    General:  in no acute distress  HEENT: MMM, conjunctiva and sclera clear  Gastrointestinal:  non-distended;   Skin: Warm and dry. No obvious rash    LABS:      CBC Full  -  ( 06 Aug 2023 05:30 )  WBC Count : 11.25 K/uL  RBC Count : 3.23 M/uL  Hemoglobin : 9.8 g/dL  Hematocrit : 31.2 %  Platelet Count - Automated : 311 K/uL  Mean Cell Volume : 96.6 fl  Mean Cell Hemoglobin : 30.3 pg  Mean Cell Hemoglobin Concentration : 31.4 gm/dL  Auto Neutrophil # : x  Auto Lymphocyte # : x  Auto Monocyte # : x  Auto Eosinophil # : x  Auto Basophil # : x  Auto Neutrophil % : x  Auto Lymphocyte % : x  Auto Monocyte % : x  Auto Eosinophil % : x  Auto Basophil % : x    08-05    137  |  105  |  31<H>  ----------------------------<  114<H>  3.9   |  26  |  0.64    Ca    7.9<L>      05 Aug 2023 06:56  Phos  3.6     08-06  Mg     2.0     08-06    TPro  7.7  /  Alb  2.1<L>  /  TBili  3.6<H>  /  DBili  2.5<H>  /  AST  108<H>  /  ALT  94<H>  /  AlkPhos  168<H>  08-06          Urinalysis Basic - ( 05 Aug 2023 06:56 )    Color: x / Appearance: x / SG: x / pH: x  Gluc: 114 mg/dL / Ketone: x  / Bili: x / Urobili: x   Blood: x / Protein: x / Nitrite: x   Leuk Esterase: x / RBC: x / WBC x   Sq Epi: x / Non Sq Epi: x / Bacteria: x                RADIOLOGY & ADDITIONAL STUDIES (The following images were personally reviewed):

## 2023-08-06 NOTE — PROGRESS NOTE ADULT - ASSESSMENT
78M w/ Crohn's, AFib/Flutter s/p DCCVs on amiodarone, remote ileocectomy and open appy here for SBO vs Crohns flare, s/p NGT decompression and now s/p lap TRE converted to open TRE, SBR x 3, left in discontinuity with abthera vac on 6/27, RTOR for ileocolic resection, small bowel anastomosis, and abdominal wall closure on 6/28, and s/p IR aspiration of perihepatic fluid on 7/3, stepped down to telemetery on 7/4. wound dehisence on 7/5, RTOR exlap, washout, ileocolic resection with end ileostomy, blow hole colostomy, red rubber from ileostomy to small bowel anastomosis; vicryl bridging mesh on 7/5. Transferred to SICU post op for HD monitoring. Extubated 7/6. SDU 8/2    NPO w. sips/ TPN  Pain/Nausea control  Home; venlafaxine, losartan, rosuvastatin,synthroid  Wound vac, fistula drain (to suction)  amiodarone, metoprolol.   ID: Blood Cx sent 7/23 - NGTD. Empiric coverage with imipenem ((7/23-7/30) -- ID signed off Dapto (7/23--7/24) IR Cx 7/3: C. tertium, Lactobacillis. OR Cx 7/5 - rare yeast - Imipenem (6/30-7/12), Fluconazole (6/30 -7/12) OR Cx 6/28: Vanc-resistant/amp-sens (but allergic) enterococcus s/p CTX (6/26-6/30), Flagyl (6/26-6/30), Dapto (6/30-7/5).  SCD/SQH  Wounds/Drains: midline incision; R JOYCE below ileostomy (D/C 7/17), L JOYCE at small bowel enterotomy repair - to [facility].  Dispo;  enterotomy repair - to [facility].

## 2023-08-07 NOTE — CHART NOTE - NSCHARTNOTEFT_GEN_A_CORE
Admitting Diagnosis:   Patient is a 78y old  Male who presents with a chief complaint of sbo (05 Aug 2023 14:42)      PAST MEDICAL & SURGICAL HISTORY:  Essential hypertension  Hypertension      Atrial fibrillation  s/p cardioversion 2010 and 2014  Pt. reports 4 DCCV  Now on Amiodarone 200mg PO bid and Eliquis 5mg PO bid  Last DCCV 4 yrs ago at [facility]      Crohn's disease  s/p partial resection of ileum      Hyperlipidemia      Hypothyroidism      History of depression  On Venlafaxine ER 150mg PO bid      H/O knee surgery      History of cataract surgery    Current Nutrition Order: NPO with TPN- 427g Dex + 161g AA + 50g SMOF lipids; provides 2595.8 kcals, 161g protein, GIR 2.9 mg/kg/min.    PO Intake: Good (%) [   ]  Fair (50-75%) [   ] Poor (<25%) [   ] [x] NPO    GI Issues: No nausea/vomiting documented at this time. Last documented bowel movement 8/7; via ileostomy.    Pain: No noted pain at time of RD interview.    Skin Integrity: stage II PU to L buttock [healed], stage I PU to sacrum, midline wound vac, ileostomy, L JOYCE drain to suction, EC fistula with pouch. Rudy score: 17.    Labs:   08-06    142  |  108  |  28<H>  ----------------------------<  98  4.4   |  24  |  0.74    Ca    8.0<L>      06 Aug 2023 05:30  Phos  3.7     08-07  Mg     2.0     08-07    TPro  7.7  /  Alb  2.1<L>  /  TBili  3.6<H>  /  DBili  2.5<H>  /  AST  108<H>  /  ALT  94<H>  /  AlkPhos  168<H>  08-06    CAPILLARY BLOOD GLUCOSE          Medications:  MEDICATIONS  (STANDING):  aMIOdarone    Tablet 200 milliGRAM(s) Oral daily  amLODIPine   Tablet 10 milliGRAM(s) Oral daily  atorvastatin 20 milliGRAM(s) Oral at bedtime  chlorhexidine 2% Cloths 1 Application(s) Topical <User Schedule>  cholestyramine Powder (Sugar-Free) 4 Gram(s) Oral two times a day  glucagon  Injectable 1 milliGRAM(s) IntraMuscular once  heparin   Injectable 5000 Unit(s) SubCutaneous every 8 hours  levothyroxine 50 MICROGram(s) Oral daily  lidocaine 1% Injectable 20 milliLiter(s) Local Injection once  lipid, fat emulsion (Fish Oil and Plant Based) 20% Infusion 0.495 Gm/kG/Day (20.83 mL/Hr) IV Continuous <Continuous>  loperamide 2 milliGRAM(s) Oral every 12 hours  losartan 25 milliGRAM(s) Oral daily  metoprolol tartrate 25 milliGRAM(s) Oral every 12 hours  nystatin Powder 1 Application(s) Topical three times a day  octreotide  Injectable 100 MICROGram(s) IV Push every 8 hours  pantoprazole  Injectable 40 milliGRAM(s) IV Push daily  Parenteral Nutrition - Adult 1 Each (96 mL/Hr) TPN Continuous <Continuous>  venlafaxine XR. 300 milliGRAM(s) Oral daily    MEDICATIONS  (PRN):  benzocaine/menthol Lozenge 2 Lozenge Oral every 4 hours PRN Sore Throat  melatonin 5 milliGRAM(s) Oral at bedtime PRN Sleep    Weight: 101kg [5 July 2023]  Daily   99.9kg [9 July 2023]  Daily 102.1kg [10 July 2023]    Weight Change: weight gain ? r/t fluid shifts, edema. previously noted with trace generalized edema    IBW: 86.4kg   % IBW: 118.2% IBW    Estimated energy needs:   Ideal body weight (86.4kg) used for calculations as pt >100% of IBW, BMI <30 per [facility] Standards of Care. Needs estimated for age and adjusted for current clinical status, increased needs for post-op & open abd wound healing    Calories: 5893-1137 kcals based on 30-35 kcals/kg  Protein: 172.8-216 g protein based on 2.0-2.5g protein/kg  *Fluid needs per team    Subjective:   78M w/ Crohn's, AFib/Flutter s/p DCCVs on amiodarone, remote ileocectomy and open appy here for SBO vs Crohn’s flare, s/p NGT decompression and now s/p lap TRE converted to open TRE, SBR x 3, left in discontinuity with abthera vac on 6/27, RTOR for ileocolic resection, small bowel anastomosis, and abdominal wall closure on 6/28, and s/p IR aspiration of perihepatic fluid on 7/3, stepped down to telemetery on 7/4. wound dehiscence on 7/5, RTOR 7/5 for exlap, washout. Extubated 7/6. Continues on TPN and started on CLD on 7/12. Started on cholestyramine and octreotide. Seen by SLP on 7/14 and cleared for soft & bite sized diet as appropriate.    Chart reviewed. Patient seen at bedside for follow up assessment. Labs reviewed 8/7; pt noted w/ elevated LFTs and hypocalcemia. Observed TPN infusing at bedside. Patient notes no n/v/d/c. Reports feeling hungry during RD interview; patient notes interest in food. Discussed plan to advance diet as able. RDN will continue to monitor, reassess, and intervene as appropriate.     Previous Nutrition Diagnosis: Increased Nutrient Needs R/T physiological demands for nutrient AEB post-op wound healing    Active [ X  ]  Resolved [   ]    If resolved, new PES:     Goal:  Pt will meet at least 75% of protein & energy needs via most appropriate route for nutrition     Recommendations:  1. Continue TPN via PICC as primary means to nutrition + advance diet to clear liquid diet as able  - recommend 427g dextrose + 175 g AA + 50g SMOF lipids; provides 2651.8 kcals, 175g protein, GIR 2.9 mg/kg/min [30.6 kcals/kg & 2.0 g protein/kg IBW, 22.6 non-protein kcals/kg]  - fluids & lytes per team [monitor outputs & adjust fluids prn]  -once meeting >75% EER via PO diet; d/c TPN per protocol (50% TPN day 1, d/c TPN day 2)  2. Vitamins/minerals per team  - consider increasing Vit C to promote wound healing  3. Weekly lipid panel  - hold lipid infusion if TG >400  4. Daily BMP/Mg/Phos, POC BG Q4-6hrs  5. Pain regimen per team  6. Monitor clinical course & adjust recommendations prn    Education: Role of RD; role of TPN    Risk Level: High [  X ] Moderate [   ] Low [   ].

## 2023-08-07 NOTE — PROGRESS NOTE ADULT - ASSESSMENT

## 2023-08-07 NOTE — PROGRESS NOTE ADULT - SUBJECTIVE AND OBJECTIVE BOX
INTERVAL HPI/OVERNIGHT EVENTS: AMRITA  STATUS POST:    6/28: ex lap, removal of abthera, ileocolic resection, small bowel anastamosis, , 1.6 crystalloid, 250 5%, 175 uop   7/3: IR del drained perihepatic aspiration of serous fluid.   7/5: RTOR exlap, washout, ileocolic resection with end ileostomy, blow hole colostomy, fistula, red rubber from ileostomy to small bowel anastomosis; vicryl bridging mesh; R JOYCE below ileostomy, L JOYCE at small bowel enterotomy repair; 500 LR, 500 5% albumin, 3u PRBC, 2 FFP, 400 UOP,         SUBJECTIVE: Patient seen and examined bedside by chief resident. No acute complaints at this time. Expresses better pain control on exam today.        MEDICATIONS  (STANDING):  aMIOdarone    Tablet 200 milliGRAM(s) Oral daily  amLODIPine   Tablet 10 milliGRAM(s) Oral daily  atorvastatin 20 milliGRAM(s) Oral at bedtime  chlorhexidine 2% Cloths 1 Application(s) Topical <User Schedule>  cholestyramine Powder (Sugar-Free) 4 Gram(s) Oral two times a day  glucagon  Injectable 1 milliGRAM(s) IntraMuscular once  heparin   Injectable 5000 Unit(s) SubCutaneous every 8 hours  levothyroxine 50 MICROGram(s) Oral daily  lidocaine 1% Injectable 20 milliLiter(s) Local Injection once  lipid, fat emulsion (Fish Oil and Plant Based) 20% Infusion 0.495 Gm/kG/Day (20.83 mL/Hr) IV Continuous <Continuous>  loperamide 2 milliGRAM(s) Oral every 12 hours  losartan 25 milliGRAM(s) Oral daily  metoprolol tartrate 25 milliGRAM(s) Oral every 12 hours  nystatin Powder 1 Application(s) Topical three times a day  octreotide  Injectable 100 MICROGram(s) IV Push every 8 hours  pantoprazole  Injectable 40 milliGRAM(s) IV Push daily  Parenteral Nutrition - Adult 1 Each (96 mL/Hr) TPN Continuous <Continuous>  venlafaxine XR. 300 milliGRAM(s) Oral daily    MEDICATIONS  (PRN):  benzocaine/menthol Lozenge 2 Lozenge Oral every 4 hours PRN Sore Throat  melatonin 5 milliGRAM(s) Oral at bedtime PRN Sleep      Vital Signs Last 24 Hrs  T(C): 37.6 (07 Aug 2023 05:35), Max: 37.9 (07 Aug 2023 04:41)  T(F): 99.6 (07 Aug 2023 05:35), Max: 100.2 (07 Aug 2023 04:41)  HR: 88 (07 Aug 2023 05:35) (84 - 90)  BP: 143/64 (07 Aug 2023 05:35) (127/62 - 164/71)  BP(mean): 92 (07 Aug 2023 05:35) (87 - 103)  RR: 18 (07 Aug 2023 05:35) (16 - 18)  SpO2: 92% (07 Aug 2023 05:35) (92% - 97%)    Parameters below as of 07 Aug 2023 05:35  Patient On (Oxygen Delivery Method): room air        PHYSICAL EXAM:  General: NAD, resting comfortably in bed  C/V: NSR  Pulm: Nonlabored breathing, no respiratory distress  Abd: soft, NT/ND. No rebound or guarding  Abdominal: soft, vac with visible suction(150/370 ccs), JOYCE drain left abdomen and EC fistula drain abdominal drain to gravity (285/655)  Extrem: WWP, no edema, SCDs in place                    I&O's Detail    06 Aug 2023 07:01  -  07 Aug 2023 07:00  --------------------------------------------------------  IN:    Fat Emulsion 20%: 249.6 mL    TPN (Total Parenteral Nutrition): 2304 mL  Total IN: 2553.6 mL    OUT:    Bulb (mL): 20 mL    Drain (mL): 405 mL    Drain (mL): 250 mL    Ileostomy (mL): 105 mL    VAC (Vacuum Assisted Closure) System (mL): 370 mL    Voided (mL): 1475 mL  Total OUT: 2625 mL    Total NET: -71.4 mL          LABS:                        9.8    11.25 )-----------( 311      ( 06 Aug 2023 05:30 )             31.2     08-06    142  |  108  |  28<H>  ----------------------------<  98  4.4   |  24  |  0.74    Ca    8.0<L>      06 Aug 2023 05:30  Phos  3.6     08-06  Mg     2.0     08-06    TPro  7.7  /  Alb  2.1<L>  /  TBili  3.6<H>  /  DBili  2.5<H>  /  AST  108<H>  /  ALT  94<H>  /  AlkPhos  168<H>  08-06      Urinalysis Basic - ( 06 Aug 2023 05:30 )    Color: x / Appearance: x / SG: x / pH: x  Gluc: 98 mg/dL / Ketone: x  / Bili: x / Urobili: x   Blood: x / Protein: x / Nitrite: x   Leuk Esterase: x / RBC: x / WBC x   Sq Epi: x / Non Sq Epi: x / Bacteria: x        RADIOLOGY & ADDITIONAL STUDIES:

## 2023-08-07 NOTE — PROGRESS NOTE ADULT - ASSESSMENT
78M w/ Crohn's, AFib/Flutter s/p DCCVs on amiodarone, remote ileocectomy and open appy here for SBO vs Crohns flare, s/p NGT decompression and now s/p lap TRE converted to open TRE, SBR x 3, left in discontinuity with abthera vac on 6/27, RTOR for ileocolic resection, small bowel anastomosis, and abdominal wall closure on 6/28, and s/p IR aspiration of perihepatic fluid on 7/3, stepped down to telemetry on 7/4. wound dehiscence on 7/5, RTOR ex-lap, washout, ileocolic resection with end ileostomy, blow hole colostomy, red rubber from ileostomy to small bowel anastomosis; Vicryl bridging mesh on 7/5. Transferred to SICU post op for HD monitoring. Extubated 7/6.   Decreased fistula output. Pending AM labs    Plan:   - NPO w/Sips & chips; continue TPN   - Wound vac change today   - Monitor fistula drain output   - PRN pain control   - Hx of A-fib/flutter; continue rate and rhythm control   - Encourage IS/OOB   - Appreciate ostomy appliance/wound manager/wound vac care per wound care team and RN   - DVT mechanical and chemo prophylaxis       D/w chief resident and attending

## 2023-08-07 NOTE — PROGRESS NOTE ADULT - SUBJECTIVE AND OBJECTIVE BOX
dressings/vac changed this am  no complaints  Tmax 100.2 now normal  WBC stable  NPO per surgical team  ostomy good   Rxs per surgical team

## 2023-08-07 NOTE — PROGRESS NOTE ADULT - SUBJECTIVE AND OBJECTIVE BOX
SUBJECTIVE:   Patient seen and examined at bedside this AM; POD 32 s/p RTOR exlap, ileocolic with end ileostomy, washout, blow hole colostomy, fistula, red rubber from ileostomy to small bowel anastomosis   No acute events overnight   Denied abdominal pain, nausea, chills   Minimal ileostomy output   No complaints or concerns       MEDICATIONS  (STANDING):  aMIOdarone    Tablet 200 milliGRAM(s) Oral daily  amLODIPine   Tablet 10 milliGRAM(s) Oral daily  atorvastatin 20 milliGRAM(s) Oral at bedtime  chlorhexidine 2% Cloths 1 Application(s) Topical <User Schedule>  cholestyramine Powder (Sugar-Free) 4 Gram(s) Oral two times a day  glucagon  Injectable 1 milliGRAM(s) IntraMuscular once  heparin   Injectable 5000 Unit(s) SubCutaneous every 8 hours  levothyroxine 50 MICROGram(s) Oral daily  lidocaine 1% Injectable 20 milliLiter(s) Local Injection once  lipid, fat emulsion (Fish Oil and Plant Based) 20% Infusion 0.495 Gm/kG/Day (20.83 mL/Hr) IV Continuous <Continuous>  losartan 25 milliGRAM(s) Oral daily  metoprolol tartrate 25 milliGRAM(s) Oral every 12 hours  nystatin Powder 1 Application(s) Topical three times a day  octreotide  Injectable 100 MICROGram(s) IV Push every 8 hours  pantoprazole  Injectable 40 milliGRAM(s) IV Push daily  Parenteral Nutrition - Adult 1 Each (96 mL/Hr) TPN Continuous <Continuous>  venlafaxine XR. 300 milliGRAM(s) Oral daily    MEDICATIONS  (PRN):  benzocaine/menthol Lozenge 2 Lozenge Oral every 4 hours PRN Sore Throat  melatonin 5 milliGRAM(s) Oral at bedtime PRN Sleep      Vital Signs Last 24 Hrs  T(C): 37.6 (07 Aug 2023 05:35), Max: 37.9 (07 Aug 2023 04:41)  T(F): 99.6 (07 Aug 2023 05:35), Max: 100.2 (07 Aug 2023 04:41)  HR: 88 (07 Aug 2023 05:35) (84 - 90)  BP: 143/64 (07 Aug 2023 05:35) (127/62 - 164/71)  BP(mean): 92 (07 Aug 2023 05:35) (87 - 103)  RR: 18 (07 Aug 2023 05:35) (16 - 18)  SpO2: 92% (07 Aug 2023 05:35) (92% - 97%)    Parameters below as of 07 Aug 2023 05:35  Patient On (Oxygen Delivery Method): room air        Physical Exam:  General: NAD, resting comfortably in bed  Pulmonary: Nonlabored breathing, no respiratory distress in room air; 1000 mL on IS  Cardiovascular: NSR  Abdominal: soft, vac with visible suction with red rubber drain, JOYCE drain left abdomen. Ileostomy (35/105), EC fistula drain (75 cc/200 cc), abdominal drain to gravity (75 cc/85 cc), JOYCE drain bulb (5 cc/20 cc)  Extremities: WWP, normal strength  Neuro: A/O x 3, no focal deficits, normal motor/sensation    I&O's Summary    06 Aug 2023 07:01  -  07 Aug 2023 07:00  --------------------------------------------------------  IN: 2553.6 mL / OUT: 2625 mL / NET: -71.4 mL        LABS:                        9.8    11.25 )-----------( 311      ( 06 Aug 2023 05:30 )             31.2     08-06    142  |  108  |  28<H>  ----------------------------<  98  4.4   |  24  |  0.74    Ca    8.0<L>      06 Aug 2023 05:30  Phos  3.6     08-06  Mg     2.0     08-06    TPro  7.7  /  Alb  2.1<L>  /  TBili  3.6<H>  /  DBili  2.5<H>  /  AST  108<H>  /  ALT  94<H>  /  AlkPhos  168<H>  08-06      Urinalysis Basic - ( 06 Aug 2023 05:30 )    Color: x / Appearance: x / SG: x / pH: x  Gluc: 98 mg/dL / Ketone: x  / Bili: x / Urobili: x   Blood: x / Protein: x / Nitrite: x   Leuk Esterase: x / RBC: x / WBC x   Sq Epi: x / Non Sq Epi: x / Bacteria: x      CAPILLARY BLOOD GLUCOSE        LIVER FUNCTIONS - ( 06 Aug 2023 05:30 )  Alb: 2.1 g/dL / Pro: 7.7 g/dL / ALK PHOS: 168 U/L / ALT: 94 U/L / AST: 108 U/L / GGT: x             RADIOLOGY & ADDITIONAL STUDIES:

## 2023-08-08 NOTE — PROGRESS NOTE ADULT - ASSESSMENT

## 2023-08-08 NOTE — PROGRESS NOTE ADULT - SUBJECTIVE AND OBJECTIVE BOX
outputs still on high side  AVSS  NL WBC  LYTES OK    loperamide dose increased  sips and chips  OOB

## 2023-08-08 NOTE — PROGRESS NOTE ADULT - SUBJECTIVE AND OBJECTIVE BOX
Pt seen and examined   no complaints      REVIEW OF SYSTEMS:  Constitutional: No fever,   Cardiovascular: No chest pain, palpitations,   Gastrointestinal: No abdominal or epigastric pain. No nausea,   Skin: No itching, burning, rashes or lesions       MEDICATIONS:  MEDICATIONS  (STANDING):  aMIOdarone    Tablet 200 milliGRAM(s) Oral daily  amLODIPine   Tablet 10 milliGRAM(s) Oral daily  atorvastatin 20 milliGRAM(s) Oral at bedtime  chlorhexidine 2% Cloths 1 Application(s) Topical <User Schedule>  cholestyramine Powder (Sugar-Free) 4 Gram(s) Oral two times a day  glucagon  Injectable 1 milliGRAM(s) IntraMuscular once  heparin   Injectable 5000 Unit(s) SubCutaneous every 8 hours  levothyroxine 50 MICROGram(s) Oral daily  lidocaine 1% Injectable 20 milliLiter(s) Local Injection once  lipid, fat emulsion (Fish Oil and Plant Based) 20% Infusion 0.495 Gm/kG/Day (20.8 mL/Hr) IV Continuous <Continuous>  loperamide 4 milliGRAM(s) Oral every 12 hours  losartan 25 milliGRAM(s) Oral daily  metoprolol tartrate 25 milliGRAM(s) Oral every 12 hours  nystatin Powder 1 Application(s) Topical three times a day  octreotide  Injectable 100 MICROGram(s) IV Push every 8 hours  pantoprazole  Injectable 40 milliGRAM(s) IV Push daily  Parenteral Nutrition - Adult 1 Each (96 mL/Hr) TPN Continuous <Continuous>  Parenteral Nutrition - Adult 1 Each (96 mL/Hr) TPN Continuous <Continuous>  potassium chloride  10 mEq/100 mL IVPB 10 milliEquivalent(s) IV Intermittent every 1 hour  venlafaxine XR. 300 milliGRAM(s) Oral daily    MEDICATIONS  (PRN):  benzocaine/menthol Lozenge 2 Lozenge Oral every 4 hours PRN Sore Throat  melatonin 5 milliGRAM(s) Oral at bedtime PRN Sleep      Allergies    penicillin (Angioedema)    Intolerances        Vital Signs Last 24 Hrs  T(C): 36.4 (08 Aug 2023 09:24), Max: 37.3 (08 Aug 2023 04:49)  T(F): 97.5 (08 Aug 2023 09:24), Max: 99.1 (08 Aug 2023 04:49)  HR: 86 (08 Aug 2023 11:08) (84 - 88)  BP: 112/57 (08 Aug 2023 11:08) (110/57 - 133/62)  BP(mean): 78 (08 Aug 2023 11:08) (77 - 93)  RR: 17 (08 Aug 2023 11:08) (16 - 18)  SpO2: 96% (08 Aug 2023 11:08) (93% - 97%)    Parameters below as of 08 Aug 2023 11:08  Patient On (Oxygen Delivery Method): room air        08-07 @ 07:01  -  08-08 @ 07:00  --------------------------------------------------------  IN: 2436.8 mL / OUT: 3317.5 mL / NET: -880.7 mL    08-08 @ 07:01  -  08-08 @ 11:34  --------------------------------------------------------  IN: 680 mL / OUT: 625 mL / NET: 55 mL        PHYSICAL EXAM:    General:  in no acute distress  HEENT: MMM, conjunctiva and sclera clear  Gastrointestinal:   non-distended; Normal bowel sounds; Vac, ostomy  Skin: Warm and dry. No obvious rash    LABS:      CBC Full  -  ( 08 Aug 2023 05:30 )  WBC Count : 7.91 K/uL  RBC Count : 2.97 M/uL  Hemoglobin : 8.9 g/dL  Hematocrit : 28.6 %  Platelet Count - Automated : 255 K/uL  Mean Cell Volume : 96.3 fl  Mean Cell Hemoglobin : 30.0 pg  Mean Cell Hemoglobin Concentration : 31.1 gm/dL  Auto Neutrophil # : x  Auto Lymphocyte # : x  Auto Monocyte # : x  Auto Eosinophil # : x  Auto Basophil # : x  Auto Neutrophil % : x  Auto Lymphocyte % : x  Auto Monocyte % : x  Auto Eosinophil % : x  Auto Basophil % : x    08-08    138  |  103  |  28<H>  ----------------------------<  138<H>  3.3<L>   |  31  |  0.68    Ca    8.1<L>      08 Aug 2023 05:30  Phos  3.4     08-08  Mg     2.0     08-08    TPro  7.4  /  Alb  2.1<L>  /  TBili  3.7<H>  /  DBili  x   /  AST  95<H>  /  ALT  92<H>  /  AlkPhos  136<H>  08-08          Urinalysis Basic - ( 08 Aug 2023 05:30 )    Color: x / Appearance: x / SG: x / pH: x  Gluc: 138 mg/dL / Ketone: x  / Bili: x / Urobili: x   Blood: x / Protein: x / Nitrite: x   Leuk Esterase: x / RBC: x / WBC x   Sq Epi: x / Non Sq Epi: x / Bacteria: x                RADIOLOGY & ADDITIONAL STUDIES (The following images were personally reviewed):

## 2023-08-08 NOTE — PROGRESS NOTE ADULT - ASSESSMENT
78M w/ Crohn's, AFib/Flutter s/p DCCVs on amiodarone, remote ileocectomy and open appy here for SBO vs Crohns flare, s/p NGT decompression and now s/p lap TRE converted to open TRE, SBR x 3, left in discontinuity with abthera vac on 6/27, RTOR for ileocolic resection, small bowel anastomosis, and abdominal wall closure on 6/28, and s/p IR aspiration of perihepatic fluid on 7/3, stepped down to telemetry on 7/4. wound dehiscence on 7/5, RTOR ex-lap, washout, ileocolic resection with end ileostomy, blow hole colostomy, red rubber from ileostomy to small bowel anastomosis; Vicryl bridging mesh on 7/5. Transferred to SICU post op for HD monitoring. Extubated 7/6.   Decreased fistula output. Leukocytosis resolved. Mild transaminitis. Hemodynamically normal     Plan:   - NPO w/Sips & chips; continue TPN   - Wound vac change tomorrow    - Monitor fistula drain output   - PRN pain control   - Hx of A-fib/flutter; continue rate and rhythm control   - Encourage IS/OOB   - Appreciate ostomy appliance/wound manager/wound vac care per wound care team and RN   - DVT mechanical and chemo prophylaxis

## 2023-08-08 NOTE — PROGRESS NOTE ADULT - SUBJECTIVE AND OBJECTIVE BOX
SUBJECTIVE:  Patient seen and examined at bedside this AM; POD 34 s/p RTOR exlap, ileocolic with end ileostomy, washout, blow hole colostomy, fistula, red rubber from ileostomy to small bowel anastomosis   Vac change performed per Surgery yesterday. No acute events overnight   Denied abdominal pain, nausea, chills   Minimal ileostomy output   No complaints or concerns       MEDICATIONS  (STANDING):  aMIOdarone    Tablet 200 milliGRAM(s) Oral daily  amLODIPine   Tablet 10 milliGRAM(s) Oral daily  atorvastatin 20 milliGRAM(s) Oral at bedtime  chlorhexidine 2% Cloths 1 Application(s) Topical <User Schedule>  cholestyramine Powder (Sugar-Free) 4 Gram(s) Oral two times a day  glucagon  Injectable 1 milliGRAM(s) IntraMuscular once  heparin   Injectable 5000 Unit(s) SubCutaneous every 8 hours  levothyroxine 50 MICROGram(s) Oral daily  lidocaine 1% Injectable 20 milliLiter(s) Local Injection once  lipid, fat emulsion (Fish Oil and Plant Based) 20% Infusion 0.4951 Gm/kG/Day (20.8 mL/Hr) IV Continuous <Continuous>  loperamide 4 milliGRAM(s) Oral every 12 hours  losartan 25 milliGRAM(s) Oral daily  metoprolol tartrate 25 milliGRAM(s) Oral every 12 hours  nystatin Powder 1 Application(s) Topical three times a day  octreotide  Injectable 100 MICROGram(s) IV Push every 8 hours  pantoprazole  Injectable 40 milliGRAM(s) IV Push daily  Parenteral Nutrition - Adult 1 Each (96 mL/Hr) TPN Continuous <Continuous>  venlafaxine XR. 300 milliGRAM(s) Oral daily    MEDICATIONS  (PRN):  benzocaine/menthol Lozenge 2 Lozenge Oral every 4 hours PRN Sore Throat  melatonin 5 milliGRAM(s) Oral at bedtime PRN Sleep      Vital Signs Last 24 Hrs  T(C): 37.3 (08 Aug 2023 04:49), Max: 37.3 (08 Aug 2023 04:49)  T(F): 99.1 (08 Aug 2023 04:49), Max: 99.1 (08 Aug 2023 04:49)  HR: 86 (08 Aug 2023 05:10) (84 - 88)  BP: 128/71 (08 Aug 2023 05:10) (110/57 - 135/66)  BP(mean): 93 (08 Aug 2023 05:10) (77 - 95)  RR: 18 (08 Aug 2023 05:10) (16 - 18)  SpO2: 94% (08 Aug 2023 05:10) (93% - 97%)    Parameters below as of 08 Aug 2023 05:10  Patient On (Oxygen Delivery Method): room air        Physical Exam:  General: NAD, resting comfortably in bed  Pulmonary: Nonlabored breathing, no respiratory distress in room air; 1000 mL on IS  Cardiovascular: NSR  Abdominal: soft, vac with visible suction with red rubber drain, JOYCE drain left abdomen. Ileostomy (0/50 cc), EC fistula drain (65/290 cc), abdominal drain to gravity (255/290 cc), JOYCE drain bulb (3/13 cc)  Extremities: WWP, normal strength  Neuro: A/O x 3, no focal deficits, normal motor/sensation    I&O's Summary    07 Aug 2023 07:01  -  08 Aug 2023 07:00  --------------------------------------------------------  IN: 2436.8 mL / OUT: 3317.5 mL / NET: -880.7 mL        LABS:                        8.9    7.91  )-----------( 255      ( 08 Aug 2023 05:30 )             28.6     08-08    138  |  103  |  28<H>  ----------------------------<  138<H>  3.3<L>   |  31  |  0.68    Ca    8.1<L>      08 Aug 2023 05:30  Phos  3.4     08-08  Mg     2.0     08-08    TPro  7.4  /  Alb  2.1<L>  /  TBili  3.7<H>  /  DBili  x   /  AST  95<H>  /  ALT  92<H>  /  AlkPhos  136<H>  08-08      Urinalysis Basic - ( 08 Aug 2023 05:30 )    Color: x / Appearance: x / SG: x / pH: x  Gluc: 138 mg/dL / Ketone: x  / Bili: x / Urobili: x   Blood: x / Protein: x / Nitrite: x   Leuk Esterase: x / RBC: x / WBC x   Sq Epi: x / Non Sq Epi: x / Bacteria: x      CAPILLARY BLOOD GLUCOSE        LIVER FUNCTIONS - ( 08 Aug 2023 05:30 )  Alb: 2.1 g/dL / Pro: 7.4 g/dL / ALK PHOS: 136 U/L / ALT: 92 U/L / AST: 95 U/L / GGT: x             RADIOLOGY & ADDITIONAL STUDIES:

## 2023-08-08 NOTE — PROGRESS NOTE ADULT - SUBJECTIVE AND OBJECTIVE BOX
SUBJECTIVE: Pt seen and examined at bedside with chief. Pt denies any complaints. Pain well controlled. Denies fever, chills, n/v.      MEDICATIONS  (STANDING):  aMIOdarone    Tablet 200 milliGRAM(s) Oral daily  amLODIPine   Tablet 10 milliGRAM(s) Oral daily  atorvastatin 20 milliGRAM(s) Oral at bedtime  chlorhexidine 2% Cloths 1 Application(s) Topical <User Schedule>  cholestyramine Powder (Sugar-Free) 4 Gram(s) Oral two times a day  glucagon  Injectable 1 milliGRAM(s) IntraMuscular once  heparin   Injectable 5000 Unit(s) SubCutaneous every 8 hours  levothyroxine 50 MICROGram(s) Oral daily  lidocaine 1% Injectable 20 milliLiter(s) Local Injection once  lipid, fat emulsion (Fish Oil and Plant Based) 20% Infusion 0.4951 Gm/kG/Day (20.8 mL/Hr) IV Continuous <Continuous>  loperamide 2 milliGRAM(s) Oral every 12 hours  losartan 25 milliGRAM(s) Oral daily  metoprolol tartrate 25 milliGRAM(s) Oral every 12 hours  nystatin Powder 1 Application(s) Topical three times a day  octreotide  Injectable 100 MICROGram(s) IV Push every 8 hours  pantoprazole  Injectable 40 milliGRAM(s) IV Push daily  Parenteral Nutrition - Adult 1 Each (96 mL/Hr) TPN Continuous <Continuous>  venlafaxine XR. 300 milliGRAM(s) Oral daily    MEDICATIONS  (PRN):  benzocaine/menthol Lozenge 2 Lozenge Oral every 4 hours PRN Sore Throat  melatonin 5 milliGRAM(s) Oral at bedtime PRN Sleep      Vital Signs Last 24 Hrs  T(C): 37.3 (08 Aug 2023 04:49), Max: 37.3 (08 Aug 2023 04:49)  T(F): 99.1 (08 Aug 2023 04:49), Max: 99.1 (08 Aug 2023 04:49)  HR: 86 (08 Aug 2023 05:10) (84 - 88)  BP: 128/71 (08 Aug 2023 05:10) (110/57 - 135/66)  BP(mean): 93 (08 Aug 2023 05:10) (77 - 95)  RR: 18 (08 Aug 2023 05:10) (16 - 18)  SpO2: 94% (08 Aug 2023 05:10) (93% - 97%)    Parameters below as of 08 Aug 2023 05:10  Patient On (Oxygen Delivery Method): room air        PHYSICAL EXAM:      Constitutional: A&Ox3    Respiratory: non labored breathing, no respiratory distress    Cardiovascular: NSR, RRR    GI: soft, NT/ND. No rebound or guarding  Abdominal: soft, vac functioning well, JOYCE drain left abdomen and EC fistula drain abdominal drain to gravity    Genitourinary: Voiding    Extremities: (-) edema                  I&O's Detail    07 Aug 2023 07:01  -  08 Aug 2023 07:00  --------------------------------------------------------  IN:    Fat Emulsion 20%: 187.2 mL    TPN (Total Parenteral Nutrition): 1824 mL  Total IN: 2011.2 mL    OUT:    Bulb (mL): 12.5 mL    Drain (mL): 750 mL    Drain (mL): 285 mL    Ileostomy (mL): 50 mL    VAC (Vacuum Assisted Closure) System (mL): 290 mL    Voided (mL): 1675 mL  Total OUT: 3062.5 mL    Total NET: -1051.3 mL          LABS:                        8.9    7.91  )-----------( 255      ( 08 Aug 2023 05:30 )             28.6     08-08    138  |  103  |  28<H>  ----------------------------<  138<H>  3.3<L>   |  31  |  0.68    Ca    8.1<L>      08 Aug 2023 05:30  Phos  3.4     08-08  Mg     2.0     08-08    TPro  7.4  /  Alb  2.1<L>  /  TBili  3.7<H>  /  DBili  x   /  AST  95<H>  /  ALT  92<H>  /  AlkPhos  136<H>  08-08      Urinalysis Basic - ( 08 Aug 2023 05:30 )    Color: x / Appearance: x / SG: x / pH: x  Gluc: 138 mg/dL / Ketone: x  / Bili: x / Urobili: x   Blood: x / Protein: x / Nitrite: x   Leuk Esterase: x / RBC: x / WBC x   Sq Epi: x / Non Sq Epi: x / Bacteria: x        RADIOLOGY & ADDITIONAL STUDIES:

## 2023-08-09 NOTE — PROGRESS NOTE ADULT - SUBJECTIVE AND OBJECTIVE BOX
SUBJECTIVE: Pt seen and examined at bedside with chief. Pt denies any complaints. Pain well controlled. Denies n/v    MEDICATIONS  (STANDING):  aMIOdarone    Tablet 200 milliGRAM(s) Oral daily  amLODIPine   Tablet 10 milliGRAM(s) Oral daily  atorvastatin 20 milliGRAM(s) Oral at bedtime  chlorhexidine 2% Cloths 1 Application(s) Topical <User Schedule>  cholestyramine Powder (Sugar-Free) 4 Gram(s) Oral two times a day  glucagon  Injectable 1 milliGRAM(s) IntraMuscular once  heparin   Injectable 5000 Unit(s) SubCutaneous every 8 hours  levothyroxine 50 MICROGram(s) Oral daily  lidocaine 1% Injectable 20 milliLiter(s) Local Injection once  lipid, fat emulsion (Fish Oil and Plant Based) 20% Infusion 0.495 Gm/kG/Day (20.8 mL/Hr) IV Continuous <Continuous>  loperamide 4 milliGRAM(s) Oral every 12 hours  losartan 25 milliGRAM(s) Oral daily  metoprolol tartrate 25 milliGRAM(s) Oral every 12 hours  nystatin Powder 1 Application(s) Topical three times a day  octreotide  Injectable 100 MICROGram(s) IV Push every 8 hours  pantoprazole  Injectable 40 milliGRAM(s) IV Push daily  Parenteral Nutrition - Adult 1 Each (96 mL/Hr) TPN Continuous <Continuous>  Parenteral Nutrition - Adult 1 Each (96 mL/Hr) TPN Continuous <Continuous>  venlafaxine XR. 300 milliGRAM(s) Oral daily    MEDICATIONS  (PRN):  benzocaine/menthol Lozenge 2 Lozenge Oral every 4 hours PRN Sore Throat  melatonin 5 milliGRAM(s) Oral at bedtime PRN Sleep      Vital Signs Last 24 Hrs  T(C): 36.6 (09 Aug 2023 05:00), Max: 36.6 (08 Aug 2023 18:00)  T(F): 97.8 (09 Aug 2023 05:00), Max: 97.8 (08 Aug 2023 18:00)  HR: 84 (09 Aug 2023 05:00) (84 - 88)  BP: 130/60 (09 Aug 2023 05:00) (104/82 - 135/59)  BP(mean): 86 (09 Aug 2023 05:00) (78 - 90)  RR: 18 (09 Aug 2023 05:00) (16 - 18)  SpO2: 95% (09 Aug 2023 05:00) (95% - 98%)    Parameters below as of 09 Aug 2023 05:00  Patient On (Oxygen Delivery Method): room air        PHYSICAL EXAM:      Constitutional: A&Ox3    Respiratory: non labored breathing, no respiratory distress    Cardiovascular: NSR, RRR    Gastrointestinal: soft, NT/ND. No rebound or guarding  Abdominal: soft, vac functioning well, JOYCE drain left abdomen and EC fistula drain abdominal drain to gravity    Genitourinary: Voiding    Extremities: (-) edema                  I&O's Detail    08 Aug 2023 07:01  -  09 Aug 2023 07:00  --------------------------------------------------------  IN:    Fat Emulsion 20%: 249.6 mL    IV PiggyBack: 300 mL    TPN (Total Parenteral Nutrition): 2112 mL  Total IN: 2661.6 mL    OUT:    Bulb (mL): 20 mL    Drain (mL): 30 mL    Drain (mL): 925 mL    Ileostomy (mL): 230 mL    VAC (Vacuum Assisted Closure) System (mL): 170 mL    Voided (mL): 1850 mL  Total OUT: 3225 mL    Total NET: -563.4 mL          LABS:                        8.9    7.91  )-----------( 255      ( 08 Aug 2023 05:30 )             28.6     08-08    138  |  103  |  28<H>  ----------------------------<  138<H>  3.3<L>   |  31  |  0.68    Ca    8.1<L>      08 Aug 2023 05:30  Phos  3.4     08-08  Mg     2.0     08-08    TPro  7.4  /  Alb  2.1<L>  /  TBili  3.7<H>  /  DBili  x   /  AST  95<H>  /  ALT  92<H>  /  AlkPhos  136<H>  08-08      Urinalysis Basic - ( 08 Aug 2023 05:30 )    Color: x / Appearance: x / SG: x / pH: x  Gluc: 138 mg/dL / Ketone: x  / Bili: x / Urobili: x   Blood: x / Protein: x / Nitrite: x   Leuk Esterase: x / RBC: x / WBC x   Sq Epi: x / Non Sq Epi: x / Bacteria: x        RADIOLOGY & ADDITIONAL STUDIES:

## 2023-08-09 NOTE — PROGRESS NOTE ADULT - SUBJECTIVE AND OBJECTIVE BOX
SUBJECTIVE:   Patient seen and examined at bedside this AM; POD 34 s/p RTOR exlap, ileocolic with end ileostomy, washout, blow hole colostomy, fistula, red rubber from ileostomy to small bowel anastomosis   Vac change performed per Surgery yesterday. No acute events overnight   Denied abdominal pain, nausea, chills   Minimal ileostomy output  No complaints or concern      MEDICATIONS  (STANDING):  aMIOdarone    Tablet 200 milliGRAM(s) Oral daily  amLODIPine   Tablet 10 milliGRAM(s) Oral daily  atorvastatin 20 milliGRAM(s) Oral at bedtime  chlorhexidine 2% Cloths 1 Application(s) Topical <User Schedule>  cholestyramine Powder (Sugar-Free) 4 Gram(s) Oral two times a day  glucagon  Injectable 1 milliGRAM(s) IntraMuscular once  heparin   Injectable 5000 Unit(s) SubCutaneous every 8 hours  levothyroxine 50 MICROGram(s) Oral daily  lidocaine 1% Injectable 20 milliLiter(s) Local Injection once  lipid, fat emulsion (Fish Oil and Plant Based) 20% Infusion 0.495 Gm/kG/Day (20.83 mL/Hr) IV Continuous <Continuous>  loperamide 4 milliGRAM(s) Oral every 12 hours  losartan 25 milliGRAM(s) Oral daily  metoprolol tartrate 25 milliGRAM(s) Oral every 12 hours  nystatin Powder 1 Application(s) Topical three times a day  octreotide  Injectable 100 MICROGram(s) IV Push every 8 hours  pantoprazole  Injectable 40 milliGRAM(s) IV Push daily  Parenteral Nutrition - Adult 1 Each (96 mL/Hr) TPN Continuous <Continuous>  Parenteral Nutrition - Adult 1 Each (96 mL/Hr) TPN Continuous <Continuous>  venlafaxine XR. 300 milliGRAM(s) Oral daily    MEDICATIONS  (PRN):  benzocaine/menthol Lozenge 2 Lozenge Oral every 4 hours PRN Sore Throat  melatonin 5 milliGRAM(s) Oral at bedtime PRN Sleep      Vital Signs Last 24 Hrs  T(C): 36.4 (09 Aug 2023 13:47), Max: 36.6 (08 Aug 2023 18:00)  T(F): 97.5 (09 Aug 2023 13:47), Max: 97.9 (09 Aug 2023 09:22)  HR: 84 (09 Aug 2023 12:27) (84 - 86)  BP: 135/67 (09 Aug 2023 12:27) (121/68 - 135/67)  BP(mean): 97 (09 Aug 2023 12:27) (86 - 97)  RR: 16 (09 Aug 2023 12:27) (16 - 18)  SpO2: 96% (09 Aug 2023 12:27) (95% - 98%)    Parameters below as of 09 Aug 2023 12:27  Patient On (Oxygen Delivery Method): room air        Physical Exam:  General: NAD, resting comfortably in bed  Pulmonary: Nonlabored breathing, no respiratory distress in room air; 1000 mL on IS  Cardiovascular: NSR  Abdominal: soft, vac with visible suction with red rubber drain, JOYCE drain left abdomen. Ileostomy (110/230 cc), EC fistula drain (0/30 cc), abdominal drain to gravity (425/1025 cc), JOYCE drain bulb (15/20 cc)  Extremities: WWP, normal strength  Neuro: A/O x 3, no focal deficits, normal motor/sensation    I&O's Summary    08 Aug 2023 07:01  -  09 Aug 2023 07:00  --------------------------------------------------------  IN: 2874.4 mL / OUT: 3325 mL / NET: -450.6 mL    09 Aug 2023 07:01  -  09 Aug 2023 17:11  --------------------------------------------------------  IN: 672 mL / OUT: 1005 mL / NET: -333 mL        LABS:                        9.9    7.31  )-----------( 279      ( 09 Aug 2023 05:30 )             31.1     08-09    135  |  101  |  28<H>  ----------------------------<  124<H>  4.0   |  28  |  0.69    Ca    7.8<L>      09 Aug 2023 05:30  Phos  3.8     08-09  Mg     2.0     08-09    TPro  7.7  /  Alb  1.9<L>  /  TBili  3.2<H>  /  DBili  x   /  AST  96<H>  /  ALT  97<H>  /  AlkPhos  156<H>  08-09      Urinalysis Basic - ( 09 Aug 2023 05:30 )    Color: x / Appearance: x / SG: x / pH: x  Gluc: 124 mg/dL / Ketone: x  / Bili: x / Urobili: x   Blood: x / Protein: x / Nitrite: x   Leuk Esterase: x / RBC: x / WBC x   Sq Epi: x / Non Sq Epi: x / Bacteria: x      CAPILLARY BLOOD GLUCOSE        LIVER FUNCTIONS - ( 09 Aug 2023 05:30 )  Alb: 1.9 g/dL / Pro: 7.7 g/dL / ALK PHOS: 156 U/L / ALT: 97 U/L / AST: 96 U/L / GGT: x             RADIOLOGY & ADDITIONAL STUDIES:   SUBJECTIVE:   Patient seen and examined at bedside this AM; POD 34 s/p RTOR exlap, ileocolic with end ileostomy, washout, blow hole colostomy, fistula, red rubber from ileostomy to small bowel anastomosis   No acute events overnight   Denied abdominal pain, nausea, chills   Minimal ileostomy output  No complaints or concern      MEDICATIONS  (STANDING):  aMIOdarone    Tablet 200 milliGRAM(s) Oral daily  amLODIPine   Tablet 10 milliGRAM(s) Oral daily  atorvastatin 20 milliGRAM(s) Oral at bedtime  chlorhexidine 2% Cloths 1 Application(s) Topical <User Schedule>  cholestyramine Powder (Sugar-Free) 4 Gram(s) Oral two times a day  glucagon  Injectable 1 milliGRAM(s) IntraMuscular once  heparin   Injectable 5000 Unit(s) SubCutaneous every 8 hours  levothyroxine 50 MICROGram(s) Oral daily  lidocaine 1% Injectable 20 milliLiter(s) Local Injection once  lipid, fat emulsion (Fish Oil and Plant Based) 20% Infusion 0.495 Gm/kG/Day (20.83 mL/Hr) IV Continuous <Continuous>  loperamide 4 milliGRAM(s) Oral every 12 hours  losartan 25 milliGRAM(s) Oral daily  metoprolol tartrate 25 milliGRAM(s) Oral every 12 hours  nystatin Powder 1 Application(s) Topical three times a day  octreotide  Injectable 100 MICROGram(s) IV Push every 8 hours  pantoprazole  Injectable 40 milliGRAM(s) IV Push daily  Parenteral Nutrition - Adult 1 Each (96 mL/Hr) TPN Continuous <Continuous>  Parenteral Nutrition - Adult 1 Each (96 mL/Hr) TPN Continuous <Continuous>  venlafaxine XR. 300 milliGRAM(s) Oral daily    MEDICATIONS  (PRN):  benzocaine/menthol Lozenge 2 Lozenge Oral every 4 hours PRN Sore Throat  melatonin 5 milliGRAM(s) Oral at bedtime PRN Sleep      Vital Signs Last 24 Hrs  T(C): 36.4 (09 Aug 2023 13:47), Max: 36.6 (08 Aug 2023 18:00)  T(F): 97.5 (09 Aug 2023 13:47), Max: 97.9 (09 Aug 2023 09:22)  HR: 84 (09 Aug 2023 12:27) (84 - 86)  BP: 135/67 (09 Aug 2023 12:27) (121/68 - 135/67)  BP(mean): 97 (09 Aug 2023 12:27) (86 - 97)  RR: 16 (09 Aug 2023 12:27) (16 - 18)  SpO2: 96% (09 Aug 2023 12:27) (95% - 98%)    Parameters below as of 09 Aug 2023 12:27  Patient On (Oxygen Delivery Method): room air        Physical Exam:  General: NAD, resting comfortably in bed  Pulmonary: Nonlabored breathing, no respiratory distress in room air; 1000 mL on IS  Cardiovascular: NSR  Abdominal: soft, vac with visible suction with red rubber drain, JOYCE drain left abdomen. Ileostomy (110/230 cc), EC fistula drain (0/30 cc), abdominal drain to gravity (425/1025 cc), JOYCE drain bulb (15/20 cc)  Extremities: WWP, normal strength  Neuro: A/O x 3, no focal deficits, normal motor/sensation    I&O's Summary    08 Aug 2023 07:01  -  09 Aug 2023 07:00  --------------------------------------------------------  IN: 2874.4 mL / OUT: 3325 mL / NET: -450.6 mL    09 Aug 2023 07:01  -  09 Aug 2023 17:11  --------------------------------------------------------  IN: 672 mL / OUT: 1005 mL / NET: -333 mL        LABS:                        9.9    7.31  )-----------( 279      ( 09 Aug 2023 05:30 )             31.1     08-09    135  |  101  |  28<H>  ----------------------------<  124<H>  4.0   |  28  |  0.69    Ca    7.8<L>      09 Aug 2023 05:30  Phos  3.8     08-09  Mg     2.0     08-09    TPro  7.7  /  Alb  1.9<L>  /  TBili  3.2<H>  /  DBili  x   /  AST  96<H>  /  ALT  97<H>  /  AlkPhos  156<H>  08-09      Urinalysis Basic - ( 09 Aug 2023 05:30 )    Color: x / Appearance: x / SG: x / pH: x  Gluc: 124 mg/dL / Ketone: x  / Bili: x / Urobili: x   Blood: x / Protein: x / Nitrite: x   Leuk Esterase: x / RBC: x / WBC x   Sq Epi: x / Non Sq Epi: x / Bacteria: x      CAPILLARY BLOOD GLUCOSE        LIVER FUNCTIONS - ( 09 Aug 2023 05:30 )  Alb: 1.9 g/dL / Pro: 7.7 g/dL / ALK PHOS: 156 U/L / ALT: 97 U/L / AST: 96 U/L / GGT: x             RADIOLOGY & ADDITIONAL STUDIES:

## 2023-08-09 NOTE — PROGRESS NOTE ADULT - ASSESSMENT

## 2023-08-09 NOTE — PROGRESS NOTE ADULT - ASSESSMENT
78M w/ Crohn's, AFib/Flutter s/p DCCVs on amiodarone, remote ileocectomy and open appy here for SBO vs Crohns flare, s/p NGT decompression and now s/p lap TRE converted to open TRE, SBR x 3, left in discontinuity with abthera vac on 6/27, RTOR for ileocolic resection, small bowel anastomosis, and abdominal wall closure on 6/28, and s/p IR aspiration of perihepatic fluid on 7/3, stepped down to telemetry on 7/4. wound dehiscence on 7/5, RTOR ex-lap, washout, ileocolic resection with end ileostomy, blow hole colostomy, red rubber from ileostomy to small bowel anastomosis; Vicryl bridging mesh on 7/5. Transferred to SICU post op for HD monitoring. Extubated 7/6. Low ileostomy output but high fistula output from wound manager bag to gravity. Surgical wound with decreasing in size and granulating with Vac     Plan:   - NPO w/Sips & chips; continue TPN   - Wound vac change Monday & Friday   - Monitor fistula drain output; continue Loperamide   - PRN pain control   - Hx of A-fib/flutter; continue rate and rhythm control   - Encourage IS/OOB   - Appreciate ostomy appliance/wound manager/wound vac care per wound care team and RN   - DVT mechanical and chemo prophylaxis       D/w chief resident and attending

## 2023-08-10 NOTE — PROGRESS NOTE ADULT - SUBJECTIVE AND OBJECTIVE BOX
INTERVAL HPI/OVERNIGHT EVENTS: AMRITA. Decreasing drain output.        STATUS POST:    6/28: ex lap, removal of abthera, ileocolic resection, small bowel anastamosis, , 1.6 crystalloid, 250 5%, 175 uop   7/3: IR Gendel drained perihepatic aspiration of serous fluid.   7/5: RTOR exlap, washout, ileocolic resection with end ileostomy, blow hole colostomy, fistula, red rubber from ileostomy to small bowel anastomosis; vicryl bridging mesh; R JOYCE below ileostomy, L JOYCE at small bowel enterotomy repair; 500 LR, 500 5% albumin, 3u PRBC, 2 FFP, 400 UOP,         SUBJECTIVE: Patient seen and examined bedside by chief resident. No acute complaints at this time. Pain is well controlled.        MEDICATIONS  (STANDING):  aMIOdarone    Tablet 200 milliGRAM(s) Oral daily  amLODIPine   Tablet 10 milliGRAM(s) Oral daily  atorvastatin 20 milliGRAM(s) Oral at bedtime  chlorhexidine 2% Cloths 1 Application(s) Topical <User Schedule>  cholestyramine Powder (Sugar-Free) 4 Gram(s) Oral two times a day  glucagon  Injectable 1 milliGRAM(s) IntraMuscular once  heparin   Injectable 5000 Unit(s) SubCutaneous every 8 hours  levothyroxine 50 MICROGram(s) Oral daily  lidocaine 1% Injectable 20 milliLiter(s) Local Injection once  lipid, fat emulsion (Fish Oil and Plant Based) 20% Infusion 0.495 Gm/kG/Day (20.83 mL/Hr) IV Continuous <Continuous>  loperamide 4 milliGRAM(s) Oral every 12 hours  losartan 25 milliGRAM(s) Oral daily  metoprolol tartrate 25 milliGRAM(s) Oral every 12 hours  nystatin Powder 1 Application(s) Topical three times a day  octreotide  Injectable 100 MICROGram(s) IV Push every 8 hours  pantoprazole  Injectable 40 milliGRAM(s) IV Push daily  Parenteral Nutrition - Adult 1 Each (96 mL/Hr) TPN Continuous <Continuous>  venlafaxine XR. 300 milliGRAM(s) Oral daily    MEDICATIONS  (PRN):  benzocaine/menthol Lozenge 2 Lozenge Oral every 4 hours PRN Sore Throat  melatonin 5 milliGRAM(s) Oral at bedtime PRN Sleep      Vital Signs Last 24 Hrs  T(C): 36.9 (10 Aug 2023 04:59), Max: 36.9 (09 Aug 2023 22:25)  T(F): 98.5 (10 Aug 2023 04:59), Max: 98.5 (10 Aug 2023 04:59)  HR: 83 (10 Aug 2023 07:30) (83 - 85)  BP: 133/65 (10 Aug 2023 07:30) (120/63 - 142/73)  BP(mean): 86 (09 Aug 2023 17:00) (86 - 97)  RR: 16 (10 Aug 2023 07:30) (16 - 16)  SpO2: 94% (10 Aug 2023 07:30) (94% - 98%)    Parameters below as of 10 Aug 2023 07:30  Patient On (Oxygen Delivery Method): room air        PHYSICAL EXAM:  General: NAD, resting comfortably in bed  C/V: NSR  Pulm: Nonlabored breathing, no respiratory distress  Abd: soft, NT/ND. No rebound or guarding  Abdominal: soft, vac with visible suction(100/160 ccs), JOYCE drain left abdomen and EC fistula drain abdominal drain to gravity (285/485)  Extrem: WWP, no edema, SCDs in place                  I&O's Detail    09 Aug 2023 07:01  -  10 Aug 2023 07:00  --------------------------------------------------------  IN:    Fat Emulsion 20%: 249.6 mL    TPN (Total Parenteral Nutrition): 2304 mL  Total IN: 2553.6 mL    OUT:    Bulb (mL): 55 mL    Drain (mL): 230 mL    Drain (mL): 255 mL    Ileostomy (mL): 470 mL    VAC (Vacuum Assisted Closure) System (mL): 160 mL    Voided (mL): 2100 mL  Total OUT: 3270 mL    Total NET: -716.4 mL          LABS:                        9.3    8.83  )-----------( 256      ( 10 Aug 2023 05:30 )             29.7     08-10    133<L>  |  101  |  28<H>  ----------------------------<  112<H>  4.2   |  25  |  0.73    Ca    7.5<L>      10 Aug 2023 05:30  Phos  3.8     08-10  Mg     2.0     08-10    TPro  7.5  /  Alb  2.0<L>  /  TBili  3.2<H>  /  DBili  2.2<H>  /  AST  97<H>  /  ALT  91<H>  /  AlkPhos  159<H>  08-10      Urinalysis Basic - ( 10 Aug 2023 05:30 )    Color: x / Appearance: x / SG: x / pH: x  Gluc: 112 mg/dL / Ketone: x  / Bili: x / Urobili: x   Blood: x / Protein: x / Nitrite: x   Leuk Esterase: x / RBC: x / WBC x   Sq Epi: x / Non Sq Epi: x / Bacteria: x        RADIOLOGY & ADDITIONAL STUDIES:

## 2023-08-10 NOTE — PROGRESS NOTE ADULT - SUBJECTIVE AND OBJECTIVE BOX
SUBJECTIVE:   Patient seen and examined at bedside this AM; POD 35 s/p RTOR exlap, ileocolic with end ileostomy, washout, blow hole colostomy, fistula, red rubber from ileostomy to small bowel anastomosis   No acute overnight events   Fistula output decreased since starting Loperamide  Denied abdominal pain, nausea, chills   No complaints or concern      MEDICATIONS  (STANDING):  aMIOdarone    Tablet 200 milliGRAM(s) Oral daily  amLODIPine   Tablet 10 milliGRAM(s) Oral daily  atorvastatin 20 milliGRAM(s) Oral at bedtime  chlorhexidine 2% Cloths 1 Application(s) Topical <User Schedule>  cholestyramine Powder (Sugar-Free) 4 Gram(s) Oral two times a day  glucagon  Injectable 1 milliGRAM(s) IntraMuscular once  heparin   Injectable 5000 Unit(s) SubCutaneous every 8 hours  levothyroxine 50 MICROGram(s) Oral daily  lidocaine 1% Injectable 20 milliLiter(s) Local Injection once  lipid, fat emulsion (Fish Oil and Plant Based) 20% Infusion 0.495 Gm/kG/Day (20.83 mL/Hr) IV Continuous <Continuous>  loperamide 4 milliGRAM(s) Oral every 12 hours  losartan 25 milliGRAM(s) Oral daily  metoprolol tartrate 25 milliGRAM(s) Oral every 12 hours  nystatin Powder 1 Application(s) Topical three times a day  octreotide  Injectable 100 MICROGram(s) IV Push every 8 hours  pantoprazole  Injectable 40 milliGRAM(s) IV Push daily  Parenteral Nutrition - Adult 1 Each (96 mL/Hr) TPN Continuous <Continuous>  Parenteral Nutrition - Adult 1 Each (96 mL/Hr) TPN Continuous <Continuous>  venlafaxine XR. 300 milliGRAM(s) Oral daily    MEDICATIONS  (PRN):  benzocaine/menthol Lozenge 2 Lozenge Oral every 4 hours PRN Sore Throat  melatonin 5 milliGRAM(s) Oral at bedtime PRN Sleep      Vital Signs Last 24 Hrs  T(C): 36.7 (10 Aug 2023 14:00), Max: 36.9 (09 Aug 2023 22:25)  T(F): 98 (10 Aug 2023 14:00), Max: 98.5 (10 Aug 2023 04:59)  HR: 82 (10 Aug 2023 12:37) (82 - 85)  BP: 118/56 (10 Aug 2023 12:37) (118/56 - 142/73)  BP(mean): 80 (10 Aug 2023 12:37) (80 - 86)  RR: 16 (10 Aug 2023 12:37) (16 - 16)  SpO2: 94% (10 Aug 2023 12:37) (93% - 98%)    Parameters below as of 10 Aug 2023 12:37  Patient On (Oxygen Delivery Method): room air        Physical Exam:      I&O's Summary    09 Aug 2023 07:01  -  10 Aug 2023 07:00  --------------------------------------------------------  IN: 2553.6 mL / OUT: 3370 mL / NET: -816.4 mL    10 Aug 2023 07:01  -  10 Aug 2023 15:39  --------------------------------------------------------  IN: 672 mL / OUT: 850 mL / NET: -178 mL        LABS:                        9.3    8.83  )-----------( 256      ( 10 Aug 2023 05:30 )             29.7     08-10    133<L>  |  101  |  28<H>  ----------------------------<  112<H>  4.2   |  25  |  0.73    Ca    7.5<L>      10 Aug 2023 05:30  Phos  3.8     08-10  Mg     2.0     08-10    TPro  7.5  /  Alb  2.0<L>  /  TBili  3.2<H>  /  DBili  2.2<H>  /  AST  97<H>  /  ALT  91<H>  /  AlkPhos  159<H>  08-10      Urinalysis Basic - ( 10 Aug 2023 05:30 )    Color: x / Appearance: x / SG: x / pH: x  Gluc: 112 mg/dL / Ketone: x  / Bili: x / Urobili: x   Blood: x / Protein: x / Nitrite: x   Leuk Esterase: x / RBC: x / WBC x   Sq Epi: x / Non Sq Epi: x / Bacteria: x      CAPILLARY BLOOD GLUCOSE        LIVER FUNCTIONS - ( 10 Aug 2023 05:30 )  Alb: 2.0 g/dL / Pro: 7.5 g/dL / ALK PHOS: 159 U/L / ALT: 91 U/L / AST: 97 U/L / GGT: x             RADIOLOGY & ADDITIONAL STUDIES:

## 2023-08-10 NOTE — PROGRESS NOTE ADULT - ASSESSMENT
78M w/ Crohn's, AFib/Flutter s/p DCCVs on amiodarone, remote ileocectomy and open appy here for SBO vs Crohns flare, s/p NGT decompression and now s/p lap TRE converted to open TRE, SBR x 3, left in discontinuity with abthera vac on 6/27, RTOR for ileocolic resection, small bowel anastomosis, and abdominal wall closure on 6/28, and s/p IR aspiration of perihepatic fluid on 7/3, stepped down to telemetry on 7/4. wound dehiscence on 7/5, RTOR ex-lap, washout, ileocolic resection with end ileostomy, blow hole colostomy, red rubber from ileostomy to small bowel anastomosis; Vicryl bridging mesh on 7/5. Transferred to SICU post op for HD monitoring. Extubated 7/6.   Decreased fistula output. Leukocytosis resolved. Stable mild transaminitis. Hemodynamically normal     Plan:   - NPO w/Sips & chips; continue TPN   - Wound vac change tomorrow at 8 am   - Monitor fistula drain output   - Continue Loperamide  - PRN pain control   - Hx of A-fib/flutter; continue rate and rhythm control   - Encourage IS/OOB   - Appreciate ostomy appliance/wound manager/wound vac care per wound care team and RN   - DVT mechanical and chemo prophylaxis     D/w chief resident and attending

## 2023-08-11 NOTE — PROGRESS NOTE ADULT - ASSESSMENT
78M w/ Crohn's, AFib/Flutter s/p DCCVs on amiodarone, remote ileocectomy and open appy here for SBO vs Crohns flare, s/p NGT decompression and now s/p lap TRE converted to open TRE, SBR x 3, left in discontinuity with abthera vac on 6/27, RTOR for ileocolic resection, small bowel anastomosis, and abdominal wall closure on 6/28, and s/p IR aspiration of perihepatic fluid on 7/3, stepped down to telemetry on 7/4. wound dehiscence on 7/5, RTOR ex-lap, washout, ileocolic resection with end ileostomy, blow hole colostomy, red rubber from ileostomy to small bowel anastomosis; Vicryl bridging mesh on 7/5. Transferred to SICU post op for HD monitoring. Extubated 7/6. Surgical wound with decreasing in size and granulating with Vac. Decreasing fistula output     Plan:   - NPO w/Sips & chips; continue TPN   - Wound vac change Monday & Friday   - Monitor fistula drain output; continue Loperamide   - PRN pain control   - Hx of A-fib/flutter; continue rate and rhythm control   - Encourage IS/OOB   - DVT ppx

## 2023-08-11 NOTE — PROGRESS NOTE ADULT - SUBJECTIVE AND OBJECTIVE BOX
SUBJECTIVE:  Patient seen and examined at bedside this AM; POD 36 s/p RTOR exlap, ileocolic with end ileostomy, washout, blow hole colostomy, fistula, red rubber from ileostomy to small bowel anastomosis   No acute overnight events   Denied abdominal pain, nausea, chills   Voiding spontaneously   No complaints or concern       MEDICATIONS  (STANDING):  aMIOdarone    Tablet 200 milliGRAM(s) Oral daily  amLODIPine   Tablet 10 milliGRAM(s) Oral daily  atorvastatin 20 milliGRAM(s) Oral at bedtime  chlorhexidine 2% Cloths 1 Application(s) Topical <User Schedule>  cholestyramine Powder (Sugar-Free) 4 Gram(s) Oral two times a day  glucagon  Injectable 1 milliGRAM(s) IntraMuscular once  heparin   Injectable 5000 Unit(s) SubCutaneous every 8 hours  levothyroxine 50 MICROGram(s) Oral daily  lidocaine 1% Injectable 20 milliLiter(s) Local Injection once  lipid, fat emulsion (Fish Oil and Plant Based) 20% Infusion 0.495 Gm/kG/Day (20.83 mL/Hr) IV Continuous <Continuous>  loperamide 4 milliGRAM(s) Oral every 12 hours  losartan 25 milliGRAM(s) Oral daily  metoprolol tartrate 25 milliGRAM(s) Oral every 12 hours  nystatin Powder 1 Application(s) Topical three times a day  octreotide  Injectable 100 MICROGram(s) IV Push every 8 hours  pantoprazole  Injectable 40 milliGRAM(s) IV Push daily  Parenteral Nutrition - Adult 1 Each (96 mL/Hr) TPN Continuous <Continuous>  Parenteral Nutrition - Adult 1 Each (96 mL/Hr) TPN Continuous <Continuous>  venlafaxine XR. 300 milliGRAM(s) Oral daily    MEDICATIONS  (PRN):  benzocaine/menthol Lozenge 2 Lozenge Oral every 4 hours PRN Sore Throat  melatonin 5 milliGRAM(s) Oral at bedtime PRN Sleep      Vital Signs Last 24 Hrs  T(C): 36.6 (11 Aug 2023 14:31), Max: 36.9 (11 Aug 2023 04:57)  T(F): 97.8 (11 Aug 2023 14:31), Max: 98.4 (11 Aug 2023 04:57)  HR: 80 (11 Aug 2023 12:20) (80 - 82)  BP: 128/60 (11 Aug 2023 12:20) (118/60 - 151/67)  BP(mean): 87 (11 Aug 2023 12:20) (84 - 97)  RR: 18 (11 Aug 2023 12:20) (16 - 18)  SpO2: 96% (11 Aug 2023 12:20) (93% - 99%)    Parameters below as of 11 Aug 2023 12:20  Patient On (Oxygen Delivery Method): room air      Physical Exam:  General: NAD, resting comfortably in bed   Pulmonary: Nonlabored breathing, no respiratory distress in room air; 1000 mL on IS  Cardiovascular: NSR  Abdominal: soft, vac with visible suction, red rubber drain to suction in fistula with surrounding wound manager to gravity, JOYCE drain left abdomen, ileostomy pink, patent and productive of loose enteric content   Extremities: WWP, normal strength  Neuro: A/O x 3, no focal deficits, normal motor/sensation    I&O's Summary    10 Aug 2023 07:01  -  11 Aug 2023 07:00  --------------------------------------------------------  IN: 2907.2 mL / OUT: 3142 mL / NET: -234.8 mL    11 Aug 2023 07:01  -  11 Aug 2023 16:15  --------------------------------------------------------  IN: 768 mL / OUT: 845 mL / NET: -77 mL        LABS:                        8.8    10.08 )-----------( 252      ( 11 Aug 2023 05:30 )             28.1     08-11    135  |  102  |  29<H>  ----------------------------<  110<H>  4.6   |  27  |  0.87    Ca    8.5      11 Aug 2023 05:30  Phos  3.7     08-11  Mg     2.0     08-11    TPro  7.8  /  Alb  2.3<L>  /  TBili  3.7<H>  /  DBili  2.5<H>  /  AST  86<H>  /  ALT  90<H>  /  AlkPhos  162<H>  08-11      Urinalysis Basic - ( 11 Aug 2023 05:30 )    Color: x / Appearance: x / SG: x / pH: x  Gluc: 110 mg/dL / Ketone: x  / Bili: x / Urobili: x   Blood: x / Protein: x / Nitrite: x   Leuk Esterase: x / RBC: x / WBC x   Sq Epi: x / Non Sq Epi: x / Bacteria: x      CAPILLARY BLOOD GLUCOSE        LIVER FUNCTIONS - ( 11 Aug 2023 05:30 )  Alb: 2.3 g/dL / Pro: 7.8 g/dL / ALK PHOS: 162 U/L / ALT: 90 U/L / AST: 86 U/L / GGT: x             RADIOLOGY & ADDITIONAL STUDIES:

## 2023-08-11 NOTE — PROGRESS NOTE ADULT - SUBJECTIVE AND OBJECTIVE BOX
NTERVAL HPI/OVERNIGHT EVENTS: vac changed.        STATUS POST:    6/28: ex lap, removal of abthera, ileocolic resection, small bowel anastamosis, , 1.6 crystalloid, 250 5%, 175 uop   7/3: IR Gendel drained perihepatic aspiration of serous fluid.   7/5: RTOR exlap, washout, ileocolic resection with end ileostomy, blow hole colostomy, fistula, red rubber from ileostomy to small bowel anastomosis; vicryl bridging mesh; R JOYCE below ileostomy, L JOYCE at small bowel enterotomy repair; 500 LR, 500 5% albumin, 3u PRBC, 2 FFP, 400 UOP,         SUBJECTIVE: Patient seen and examined bedside by chief resident. No acute complaints at this time. Pain is well controlled.      MEDICATIONS  (STANDING):  aMIOdarone    Tablet 200 milliGRAM(s) Oral daily  amLODIPine   Tablet 10 milliGRAM(s) Oral daily  atorvastatin 20 milliGRAM(s) Oral at bedtime  chlorhexidine 2% Cloths 1 Application(s) Topical <User Schedule>  cholestyramine Powder (Sugar-Free) 4 Gram(s) Oral two times a day  glucagon  Injectable 1 milliGRAM(s) IntraMuscular once  heparin   Injectable 5000 Unit(s) SubCutaneous every 8 hours  levothyroxine 50 MICROGram(s) Oral daily  lidocaine 1% Injectable 20 milliLiter(s) Local Injection once  lipid, fat emulsion (Fish Oil and Plant Based) 20% Infusion 0.495 Gm/kG/Day (20.83 mL/Hr) IV Continuous <Continuous>  loperamide 4 milliGRAM(s) Oral every 12 hours  losartan 25 milliGRAM(s) Oral daily  metoprolol tartrate 25 milliGRAM(s) Oral every 12 hours  nystatin Powder 1 Application(s) Topical three times a day  octreotide  Injectable 100 MICROGram(s) IV Push every 8 hours  pantoprazole  Injectable 40 milliGRAM(s) IV Push daily  Parenteral Nutrition - Adult 1 Each (96 mL/Hr) TPN Continuous <Continuous>  Parenteral Nutrition - Adult 1 Each (96 mL/Hr) TPN Continuous <Continuous>  venlafaxine XR. 300 milliGRAM(s) Oral daily    MEDICATIONS  (PRN):  benzocaine/menthol Lozenge 2 Lozenge Oral every 4 hours PRN Sore Throat  melatonin 5 milliGRAM(s) Oral at bedtime PRN Sleep      Vital Signs Last 24 Hrs  T(C): 36.5 (11 Aug 2023 16:18), Max: 36.9 (11 Aug 2023 04:57)  T(F): 97.7 (11 Aug 2023 16:18), Max: 98.4 (11 Aug 2023 04:57)  HR: 78 (11 Aug 2023 16:15) (78 - 82)  BP: 120/61 (11 Aug 2023 16:15) (118/60 - 151/67)  BP(mean): 85 (11 Aug 2023 16:15) (84 - 97)  RR: 18 (11 Aug 2023 16:15) (17 - 18)  SpO2: 95% (11 Aug 2023 16:15) (93% - 97%)    Parameters below as of 11 Aug 2023 16:15  Patient On (Oxygen Delivery Method): room air        PHYSICAL EXAM:    PHYSICAL EXAM:  General: NAD, resting comfortably in bed  C/V: NSR  Pulm: Nonlabored breathing, no respiratory distress  Abd: soft, NT/ND. No rebound or guarding  Abdominal: soft, vac with visible suction, JOYCE drain left abdomen and EC fistula drain abdominal drain to gravity.  Extrem: WWP, no edema, SCDs in place                      I&O's Detail    10 Aug 2023 07:01  -  11 Aug 2023 07:00  --------------------------------------------------------  IN:    Fat Emulsion 20%: 187.2 mL    Oral Fluid: 800 mL    TPN (Total Parenteral Nutrition): 1920 mL  Total IN: 2907.2 mL    OUT:    Bulb (mL): 4 mL    Drain (mL): 100 mL    Drain (mL): 490 mL    Ileostomy (mL): 198 mL    VAC (Vacuum Assisted Closure) System (mL): 450 mL    Voided (mL): 1900 mL  Total OUT: 3142 mL    Total NET: -234.8 mL      11 Aug 2023 07:01  -  11 Aug 2023 17:06  --------------------------------------------------------  IN:    TPN (Total Parenteral Nutrition): 960 mL  Total IN: 960 mL    OUT:    Bulb (mL): 5 mL    Drain (mL): 60 mL    Drain (mL): 50 mL    Ileostomy (mL): 70 mL    VAC (Vacuum Assisted Closure) System (mL): 20 mL    Voided (mL): 840 mL  Total OUT: 1045 mL    Total NET: -85 mL          LABS:                        8.8    10.08 )-----------( 252      ( 11 Aug 2023 05:30 )             28.1     08-11    135  |  102  |  29<H>  ----------------------------<  110<H>  4.6   |  27  |  0.87    Ca    8.5      11 Aug 2023 05:30  Phos  3.7     08-11  Mg     2.0     08-11    TPro  7.8  /  Alb  2.3<L>  /  TBili  3.7<H>  /  DBili  2.5<H>  /  AST  86<H>  /  ALT  90<H>  /  AlkPhos  162<H>  08-11      Urinalysis Basic - ( 11 Aug 2023 05:30 )    Color: x / Appearance: x / SG: x / pH: x  Gluc: 110 mg/dL / Ketone: x  / Bili: x / Urobili: x   Blood: x / Protein: x / Nitrite: x   Leuk Esterase: x / RBC: x / WBC x   Sq Epi: x / Non Sq Epi: x / Bacteria: x        RADIOLOGY & ADDITIONAL STUDIES:

## 2023-08-11 NOTE — CHART NOTE - NSCHARTNOTEFT_GEN_A_CORE
Admitting Diagnosis:   Patient is a 78y old  Male who presents with a chief complaint of sbo (07 Aug 2023 14:14)      PAST MEDICAL & SURGICAL HISTORY:  Essential hypertension  Hypertension      Atrial fibrillation  s/p cardioversion 2010 and 2014  Pt. reports 4 DCCV  Now on Amiodarone 200mg PO bid and Eliquis 5mg PO bid  Last DCCV 4 yrs ago at [facility]      Crohn's disease  s/p partial resection of ileum      Hyperlipidemia      Hypothyroidism      History of depression  On Venlafaxine ER 150mg PO bid      H/O knee surgery      History of cataract surgery    Current Nutrition Order: NPO with TPN via PICC; 427 g Dextrose, 175 g amino acids, 50 g SMOF lipids.      PO Intake: N/A    GI Issues: Abdomen non-distended/non-tender, +BS x4, last bowel movement 8/10    Pain: 0 per chart review     Skin Integrity: Post-surgical incision, no edema noted     Labs:   08-11    135  |  102  |  29<H>  ----------------------------<  110<H>  4.6   |  27  |  0.87    Ca    8.5      11 Aug 2023 05:30  Phos  3.7     08-11  Mg     2.0     08-11    TPro  7.8  /  Alb  2.3<L>  /  TBili  3.7<H>  /  DBili  2.5<H>  /  AST  86<H>  /  ALT  90<H>  /  AlkPhos  162<H>  08-11    CAPILLARY BLOOD GLUCOSE    Medications:  MEDICATIONS  (STANDING):  aMIOdarone    Tablet 200 milliGRAM(s) Oral daily  amLODIPine   Tablet 10 milliGRAM(s) Oral daily  atorvastatin 20 milliGRAM(s) Oral at bedtime  chlorhexidine 2% Cloths 1 Application(s) Topical <User Schedule>  cholestyramine Powder (Sugar-Free) 4 Gram(s) Oral two times a day  glucagon  Injectable 1 milliGRAM(s) IntraMuscular once  heparin   Injectable 5000 Unit(s) SubCutaneous every 8 hours  levothyroxine 50 MICROGram(s) Oral daily  lidocaine 1% Injectable 20 milliLiter(s) Local Injection once  lipid, fat emulsion (Fish Oil and Plant Based) 20% Infusion 0.495 Gm/kG/Day (20.83 mL/Hr) IV Continuous <Continuous>  loperamide 4 milliGRAM(s) Oral every 12 hours  losartan 25 milliGRAM(s) Oral daily  metoprolol tartrate 25 milliGRAM(s) Oral every 12 hours  nystatin Powder 1 Application(s) Topical three times a day  octreotide  Injectable 100 MICROGram(s) IV Push every 8 hours  pantoprazole  Injectable 40 milliGRAM(s) IV Push daily  Parenteral Nutrition - Adult 1 Each (96 mL/Hr) TPN Continuous <Continuous>  Parenteral Nutrition - Adult 1 Each (96 mL/Hr) TPN Continuous <Continuous>  venlafaxine XR. 300 milliGRAM(s) Oral daily    MEDICATIONS  (PRN):  benzocaine/menthol Lozenge 2 Lozenge Oral every 4 hours PRN Sore Throat  melatonin 5 milliGRAM(s) Oral at bedtime PRN Sleep    Weight: 101kg [5 July 2023]  Daily   99.9kg [9 July 2023]  Daily 102.1kg [10 July 2023]  8/9 - 101 kg     -Please continue daily wts     IBW: 86.4kg   % IBW: 118.2% IBW    Estimated energy needs:   Ideal body weight (86.4kg) used for calculations as pt >100% of IBW, BMI <30 per [facility] Standards of Care. Needs estimated for age and adjusted for current clinical status, increased needs for post-op & open abd wound healing    Calories: 3474-5298 kcals based on 30-35 kcals/kg  Protein: 172.8-216 g protein based on 2.0-2.5g protein/kg  *Fluid needs per team    Subjective:   78M w/ Crohn's, AFib/Flutter s/p DCCVs on amiodarone, remote ileocectomy and open appy here for SBO vs Crohn’s flare, s/p NGT decompression and now s/p lap TRE converted to open TRE, SBR x 3, left in discontinuity with abthera vac on 6/27, RTOR for ileocolic resection, small bowel anastomosis, and abdominal wall closure on 6/28, and s/p IR aspiration of perihepatic fluid on 7/3, stepped down to telemetery on 7/4. wound dehiscence on 7/5, RTOR 7/5 for exlap, washout. Extubated 7/6. Continues on TPN and started on CLD on 7/12. Started on cholestyramine and octreotide. Seen by SLP on 7/14 and cleared for soft & bite sized diet as appropriate.     Pt assessed at bedside on 8LA. Rx and labs reviewed. Pt remains on TPN with wound vac to abdomen. TPN monitoring detailed below. No new report of intolerance to parenteral nutrition support. Continues on loperamide and octreotide; loose/green stool output via ileostomy documented in RN flowsheets. Plan for wound vac exchange. No other reports GI distress or further nutritional concerns at this time. RDN will continue to reassess, intervene, and monitor as appropriate.     TPN order: 427 gDex, 175 gAA, and 50 gSMOF providing 2651.8 kcals, 175g protein, with GIR 2.9 mg/kg/min. This is 30.6 kcals/kg (22.6 nonprotein kcal) and 2.0 g protein/kg ideal body weight of 86.4kg.     Daily BMP: Na 135, K 4.6, Cl 102, BUn 29 (H), Cr 0.87, Ca 8.5, GFR 89, Alb. 2.3 (L), T.bili 3.7 (H), D.bili 2.5 (H), elevated LFTs, Mg 2.0, Phos 3.7, TGs 8/8 131, BG x24hr: 110-112.     I/Os x24hr: -816.4 cc     Previous Nutrition Diagnosis: Increased Nutrient Needs R/T physiological demands for nutrient AEB post-op wound healing    Active [ X  ]  Resolved [   ]    If resolved, new PES:     Goal:  Pt will meet at least 75% of protein & energy needs via most appropriate route for nutrition     Recommendations:  -Continue parenteral nutrition support as detailed above   -Continue daily BMP with Mg and Phos  -BG checks daily   -Weigh daily   -I/O's daily   -6hr Post infusion serum TG's weekly   -Advance diet as tolerated per medical/surgical teams   -Monitor chemistry, GI function, and skin integrity     Risk Level: High [ x ] Moderate [   ] Low [   ]

## 2023-08-12 NOTE — PROGRESS NOTE ADULT - ASSESSMENT

## 2023-08-12 NOTE — PROGRESS NOTE ADULT - SUBJECTIVE AND OBJECTIVE BOX
STATUS POST:  Exploratory laparotomy    Laparoscopic lysis of intestinal adhesions    Exploratory laparotomy    Open lysis of intestinal adhesions    Resection of small bowel    Temporary closure of abdominal cavity    Repair, anastomosis, ileocolic    Small bowel resection with anastomosis    Flap, myocutaneous, abdominal wall    Reconstruction of abdominal wall using mesh    Washout of abdominal cavity    Creation of ileostomy    Creation of colostomy    Laparoscopic lysis of intestinal adhesions    Open lysis of intestinal adhesions    Resection of small bowel    Temporary closure of abdominal cavity    Repair, anastomosis, ileocolic    Small bowel resection with anastomosis    Flap, myocutaneous, abdominal wall    Reconstruction of abdominal wall using mesh    Washout of abdominal cavity        POST OPERATIVE DAY #:     SUBJECTIVE:     Vital Signs Last 24 Hrs  T(C): 36.2 (12 Aug 2023 04:53), Max: 36.6 (11 Aug 2023 14:31)  T(F): 97.2 (12 Aug 2023 04:53), Max: 97.8 (11 Aug 2023 14:31)  HR: 80 (12 Aug 2023 05:06) (78 - 82)  BP: 130/58 (12 Aug 2023 05:06) (120/61 - 135/68)  BP(mean): 85 (12 Aug 2023 05:06) (85 - 93)  RR: 16 (12 Aug 2023 05:06) (16 - 18)  SpO2: 100% (12 Aug 2023 05:06) (93% - 100%)    Parameters below as of 12 Aug 2023 05:06  Patient On (Oxygen Delivery Method): room air        I&O's Summary    11 Aug 2023 07:01  -  12 Aug 2023 07:00  --------------------------------------------------------  IN: 2595.2 mL / OUT: 2806 mL / NET: -210.8 mL        Physical Exam:  General Appearance: Appears well, NAD  Pulmonary: Nonlabored breathing, no respiratory distress  Cardiovascular: NSR  Abdomen: Soft, nondisteded, appropriate incisional tenderness, dressings clean and dry and intact  Extremities: WWP, SCD's in place     LABS:                        8.9    8.76  )-----------( 258      ( 12 Aug 2023 06:12 )             28.6     08-12    137  |  104  |  26<H>  ----------------------------<  101<H>  4.3   |  26  |  0.73    Ca    8.3<L>      12 Aug 2023 06:12  Phos  4.1     08-12  Mg     2.1     08-12    TPro  7.6  /  Alb  2.1<L>  /  TBili  3.5<H>  /  DBili  2.5<H>  /  AST  97<H>  /  ALT  88<H>  /  AlkPhos  145<H>  08-12      Urinalysis Basic - ( 12 Aug 2023 06:12 )    Color: x / Appearance: x / SG: x / pH: x  Gluc: 101 mg/dL / Ketone: x  / Bili: x / Urobili: x   Blood: x / Protein: x / Nitrite: x   Leuk Esterase: x / RBC: x / WBC x   Sq Epi: x / Non Sq Epi: x / Bacteria: x       STATUS POST:  Exploratory laparotomy    Laparoscopic lysis of intestinal adhesions    Exploratory laparotomy    Open lysis of intestinal adhesions    Resection of small bowel    Temporary closure of abdominal cavity    Repair, anastomosis, ileocolic    Small bowel resection with anastomosis    Flap, myocutaneous, abdominal wall    Reconstruction of abdominal wall using mesh    Washout of abdominal cavity    Creation of ileostomy    Creation of colostomy    Laparoscopic lysis of intestinal adhesions    Open lysis of intestinal adhesions    Resection of small bowel    Temporary closure of abdominal cavity    Repair, anastomosis, ileocolic    Small bowel resection with anastomosis    Flap, myocutaneous, abdominal wall    Reconstruction of abdominal wall using mesh    Washout of abdominal cavity            SUBJECTIVE: Pt seen and examined at the bedside by chief resident. No acute complaints at this time, pain well controlled. Red rubber replaced after falling ON.    Vital Signs Last 24 Hrs  T(C): 36.2 (12 Aug 2023 04:53), Max: 36.6 (11 Aug 2023 14:31)  T(F): 97.2 (12 Aug 2023 04:53), Max: 97.8 (11 Aug 2023 14:31)  HR: 80 (12 Aug 2023 05:06) (78 - 82)  BP: 130/58 (12 Aug 2023 05:06) (120/61 - 135/68)  BP(mean): 85 (12 Aug 2023 05:06) (85 - 93)  RR: 16 (12 Aug 2023 05:06) (16 - 18)  SpO2: 100% (12 Aug 2023 05:06) (93% - 100%)    Parameters below as of 12 Aug 2023 05:06  Patient On (Oxygen Delivery Method): room air        I&O's Summary    11 Aug 2023 07:01  -  12 Aug 2023 07:00  --------------------------------------------------------  IN: 2595.2 mL / OUT: 2806 mL / NET: -210.8 mL          PHYSICAL EXAM:  General: NAD, resting comfortably in chair  C/V: NSR  Pulm: Nonlabored breathing, no respiratory distress  Abd: soft, NT/ND. No rebound or guarding  Abdominal: soft, vac with visible suction, JOYCE drain left abdomen and EC fistula drain   Extrem: WWP, no edema, SCDs in place    LABS:                        8.9    8.76  )-----------( 258      ( 12 Aug 2023 06:12 )             28.6     08-12    137  |  104  |  26<H>  ----------------------------<  101<H>  4.3   |  26  |  0.73    Ca    8.3<L>      12 Aug 2023 06:12  Phos  4.1     08-12  Mg     2.1     08-12    TPro  7.6  /  Alb  2.1<L>  /  TBili  3.5<H>  /  DBili  2.5<H>  /  AST  97<H>  /  ALT  88<H>  /  AlkPhos  145<H>  08-12      Urinalysis Basic - ( 12 Aug 2023 06:12 )    Color: x / Appearance: x / SG: x / pH: x  Gluc: 101 mg/dL / Ketone: x  / Bili: x / Urobili: x   Blood: x / Protein: x / Nitrite: x   Leuk Esterase: x / RBC: x / WBC x   Sq Epi: x / Non Sq Epi: x / Bacteria: x

## 2023-08-13 NOTE — PROGRESS NOTE ADULT - ASSESSMENT

## 2023-08-13 NOTE — PROGRESS NOTE ADULT - SUBJECTIVE AND OBJECTIVE BOX
Feels OK, no acute overnight issues.  Exam is unchanged.  Will continue NPO and TPN.  VAC dressing change tomorrow.  Needs intensive PT.

## 2023-08-13 NOTE — PROGRESS NOTE ADULT - SUBJECTIVE AND OBJECTIVE BOX
INTERVAL HPI/OVERNIGHT EVENTS: Red rubber replaced after falling out, AMRITA, VSS    STATUS POST:    6/27: Laparoscopic lysis of adhesions, converted to open lysis of adhesions, SBR x 3, temporary abdominal closure, 5L cryst, 1L 5% alb, 3 pRBC, 1 FFP, 1 plts  6/28: ex lap, removal of abthera, ileocolic resection, small bowel anastamosis, , 1.6 crystalloid, 250 5%, 175 uop   7/3: IR Gendel drained perihepatic aspiration of serous fluid.   7/5: RTOR exlap, washout, ileocolic resection with end ileostomy, blow hole colostomy, fistula, red rubber from ileostomy to small bowel anastomosis; vicryl bridging mesh; R JOYCE below ileostomy, L JOYCE at small bowel enterotomy repair; 500 LR, 500 5% albumin, 3u PRBC, 2 FFP, 400 UOP,     SUBJECTIVE: Pt seen and examined at bedside this am by surgery team. No acute complaints. Tolerating sips,, pain well controlled. Denies f/n/v/cp/sob.    MEDICATIONS  (STANDING):  aMIOdarone    Tablet 200 milliGRAM(s) Oral daily  amLODIPine   Tablet 10 milliGRAM(s) Oral daily  atorvastatin 20 milliGRAM(s) Oral at bedtime  chlorhexidine 2% Cloths 1 Application(s) Topical <User Schedule>  cholestyramine Powder (Sugar-Free) 4 Gram(s) Oral two times a day  glucagon  Injectable 1 milliGRAM(s) IntraMuscular once  heparin   Injectable 5000 Unit(s) SubCutaneous every 8 hours  levothyroxine 50 MICROGram(s) Oral daily  lipid, fat emulsion (Fish Oil and Plant Based) 20% Infusion 0.495 Gm/kG/Day (20.8 mL/Hr) IV Continuous <Continuous>  loperamide 4 milliGRAM(s) Oral every 12 hours  losartan 25 milliGRAM(s) Oral daily  metoprolol tartrate 25 milliGRAM(s) Oral every 12 hours  nystatin Powder 1 Application(s) Topical three times a day  octreotide  Injectable 100 MICROGram(s) IV Push every 8 hours  pantoprazole  Injectable 40 milliGRAM(s) IV Push daily  Parenteral Nutrition - Adult 1 Each (96 mL/Hr) TPN Continuous <Continuous>  Parenteral Nutrition - Adult 1 Each (96 mL/Hr) TPN Continuous <Continuous>  venlafaxine XR. 300 milliGRAM(s) Oral daily    MEDICATIONS  (PRN):  benzocaine/menthol Lozenge 2 Lozenge Oral every 4 hours PRN Sore Throat  melatonin 5 milliGRAM(s) Oral at bedtime PRN Sleep      Vital Signs Last 24 Hrs  T(C): 37.7 (13 Aug 2023 09:00), Max: 37.7 (13 Aug 2023 09:00)  T(F): 99.8 (13 Aug 2023 09:00), Max: 99.8 (13 Aug 2023 09:00)  HR: 81 (13 Aug 2023 10:00) (78 - 81)  BP: 130/63 (13 Aug 2023 10:00) (111/66 - 146/66)  BP(mean): 90 (13 Aug 2023 10:00) (79 - 95)  RR: 18 (13 Aug 2023 10:00) (17 - 18)  SpO2: 96% (13 Aug 2023 10:00) (95% - 99%)    Parameters below as of 13 Aug 2023 10:00  Patient On (Oxygen Delivery Method): room air    PHYSICAL EXAM:    Constitutional: A&Ox3, NAD    Respiratory: non labored breathing, no respiratory distress    Cardiovascular: NSR, RRR    Abdominal: soft, nd, nt. No R/G. vac with visible suction, JOYCE drain left abdomen and EC fistula drain     Extremities: wwp, no calf tenderness or edema. SCDs in place    I&O's Detail    12 Aug 2023 07:01  -  13 Aug 2023 07:00  --------------------------------------------------------  IN:    Fat Emulsion (Fish Oil &amp; Plant Based) 20% Infusion: 62.4 mL    Fat Emulsion 20%: 187.2 mL    TPN (Total Parenteral Nutrition): 2016 mL  Total IN: 2265.6 mL    OUT:    Bulb (mL): 14 mL    Drain (mL): 460 mL    Drain (mL): 140 mL    Ileostomy (mL): 240 mL    VAC (Vacuum Assisted Closure) System (mL): 270 mL    Voided (mL): 2060 mL  Total OUT: 3184 mL    Total NET: -918.4 mL      13 Aug 2023 07:01  -  13 Aug 2023 12:56  --------------------------------------------------------  IN:    TPN (Total Parenteral Nutrition): 576 mL  Total IN: 576 mL    OUT:    Bulb (mL): 0 mL    Drain (mL): 200 mL    Drain (mL): 100 mL    VAC (Vacuum Assisted Closure) System (mL): 90 mL    Voided (mL): 500 mL  Total OUT: 890 mL    Total NET: -314 mL          LABS:                        8.9    8.76  )-----------( 258      ( 12 Aug 2023 06:12 )             28.6     08-13    132<L>  |  102  |  28<H>  ----------------------------<  109<H>  4.9   |  23  |  0.81    Ca    8.5      13 Aug 2023 05:30  Phos  4.3     08-13  Mg     1.9     08-13    TPro  7.6  /  Alb  2.1<L>  /  TBili  3.5<H>  /  DBili  2.5<H>  /  AST  97<H>  /  ALT  88<H>  /  AlkPhos  145<H>  08-12      Urinalysis Basic - ( 13 Aug 2023 05:30 )    Color: x / Appearance: x / SG: x / pH: x  Gluc: 109 mg/dL / Ketone: x  / Bili: x / Urobili: x   Blood: x / Protein: x / Nitrite: x   Leuk Esterase: x / RBC: x / WBC x   Sq Epi: x / Non Sq Epi: x / Bacteria: x        RADIOLOGY & ADDITIONAL STUDIES:

## 2023-08-14 NOTE — PROGRESS NOTE ADULT - ASSESSMENT
78M w/ Crohn's, AFib/Flutter s/p DCCVs on amiodarone, remote ileocectomy and open appy here for SBO vs Crohns flare, s/p NGT decompression and now s/p lap TRE converted to open TRE, SBR x 3, left in discontinuity with abthera vac on 6/27, RTOR for ileocolic resection, small bowel anastomosis, and abdominal wall closure on 6/28, and s/p IR aspiration of perihepatic fluid on 7/3, stepped down to telemetry on 7/4. wound dehiscence on 7/5, RTOR ex-lap, washout, ileocolic resection with end ileostomy, blow hole colostomy, red rubber from ileostomy to small bowel anastomosis; Vicryl bridging mesh on 7/5. Transferred to SICU post op for HD monitoring. Extubated 7/6. Surgical wound with decreasing in size and granulating with Vac. Decreasing fistula output. Red rubber replaced. Wound vac pending today    Plan:   - NPO w/Sips & chips; continue TPN   - Wound vac changed today (Monday) & pending this Friday   - Monitor fistula drain output; continue Loperamide   - PRN pain control   - Hx of A-fib/flutter; continue rate and rhythm control   - Encourage IS/OOB   - DVT ppx

## 2023-08-14 NOTE — CHART NOTE - NSCHARTNOTEFT_GEN_A_CORE
Admitting Diagnosis:   Patient is a 78y old  Male who presents with a chief complaint of sbo (07 Aug 2023 14:14)      PAST MEDICAL & SURGICAL HISTORY:  Essential hypertension  Hypertension    Atrial fibrillation  s/p cardioversion 2010 and 2014  Pt. reports 4 DCCV  Now on Amiodarone 200mg PO bid and Eliquis 5mg PO bid  Last DCCV 4 yrs ago at [facility]    Crohn's disease  s/p partial resection of ileum    Hyperlipidemia    Hypothyroidism    History of depression  On Venlafaxine ER 150mg PO bid    H/O knee surgery    History of cataract surgery    Current Nutrition Order:  Diet, NPO:   Except Medications  With Ice Chips/Sips of Water (08-09-23 @ 15:15)      PO Intake: Good (%) [   ]  Fair (50-75%) [   ] Poor (<25%) [   ]    GI Issues:     Pain:    Skin Integrity:    Labs:   08-14    133<L>  |  100  |  29<H>  ----------------------------<  112<H>  4.1   |  26  |  0.88    Ca    8.1<L>      14 Aug 2023 07:31  Phos  4.4     08-14  Mg     2.0     08-14    TPro  7.5  /  Alb  2.1<L>  /  TBili  3.6<H>  /  DBili  x   /  AST  91<H>  /  ALT  88<H>  /  AlkPhos  157<H>  08-14    CAPILLARY BLOOD GLUCOSE          Medications:  MEDICATIONS  (STANDING):  aMIOdarone    Tablet 200 milliGRAM(s) Oral daily  amLODIPine   Tablet 10 milliGRAM(s) Oral daily  atorvastatin 20 milliGRAM(s) Oral at bedtime  chlorhexidine 2% Cloths 1 Application(s) Topical <User Schedule>  cholestyramine Powder (Sugar-Free) 4 Gram(s) Oral two times a day  glucagon  Injectable 1 milliGRAM(s) IntraMuscular once  heparin   Injectable 5000 Unit(s) SubCutaneous every 8 hours  levothyroxine 50 MICROGram(s) Oral daily  lipid, fat emulsion (Fish Oil and Plant Based) 20% Infusion 0.495 Gm/kG/Day (20.8 mL/Hr) IV Continuous <Continuous>  loperamide 4 milliGRAM(s) Oral every 12 hours  losartan 25 milliGRAM(s) Oral daily  metoprolol tartrate 25 milliGRAM(s) Oral every 12 hours  nystatin Powder 1 Application(s) Topical three times a day  octreotide  Injectable 100 MICROGram(s) IV Push every 8 hours  pantoprazole  Injectable 40 milliGRAM(s) IV Push daily  Parenteral Nutrition - Adult 1 Each (96 mL/Hr) TPN Continuous <Continuous>  Parenteral Nutrition - Adult 1 Each (96 mL/Hr) TPN Continuous <Continuous>  venlafaxine XR. 300 milliGRAM(s) Oral daily    MEDICATIONS  (PRN):  benzocaine/menthol Lozenge 2 Lozenge Oral every 4 hours PRN Sore Throat  melatonin 5 milliGRAM(s) Oral at bedtime PRN Sleep      Anthropometrics:  Daily     Daily     IBW:     % IBW    Weight Change:     Nutrition Focused Physical Exam: Completed [   ]  Not Pertinent [   ]  Muscle Wasting- Temporal [   ]  Clavicle/Pectoral [   ]  Shoulder/Deltoid [   ]  Scapula [   ]  Interosseous [   ]  Quadriceps [   ]  Gastrocnemius [   ]  Fat Wasting- Orbital [   ]  Buccal [   ]  Triceps [   ]  Rib [   ]  Suspect [PCM] 2/2 to physical assessment, [poor intake], and [wt loss]; please see malnutrition chart note.    Estimated energy needs:   Calories:   Protein:   Fluid:    Subjective:     Previous Nutrition Diagnosis:    Active [   ]  Resolved [   ]    If resolved, new PES:     Goal:    Recommendations:    Education:     Risk Level: High [   ] Moderate [   ] Low [   ] Admitting Diagnosis:   Patient is a 78y old  Male who presents with a chief complaint of sbo (07 Aug 2023 14:14)      PAST MEDICAL & SURGICAL HISTORY:  Essential hypertension  Hypertension    Atrial fibrillation  s/p cardioversion 2010 and 2014  Pt. reports 4 DCCV  Now on Amiodarone 200mg PO bid and Eliquis 5mg PO bid  Last DCCV 4 yrs ago at [facility]    Crohn's disease  s/p partial resection of ileum    Hyperlipidemia    Hypothyroidism    History of depression  On Venlafaxine ER 150mg PO bid    H/O knee surgery    History of cataract surgery    Current Nutrition Order: Diet, NPO with TPN   Except Medications, With Ice Chips/Sips of Water (08-09-23 @ 15:15)    Current TPN Order via PICC: 427 g Dextrose, 175 g amino acids, 50 g SMOF lipids providing 2,651.8kcals, 175g protein    PO Intake: Good (%) [   ]  Fair (50-75%) [   ] Poor (<25%) [   ] - - N/A, NPO on TPN    GI Issues: Denies N/V; noted with ileostomy, +BM 8/13 x3. Per RN flowsheets, abd appearance with contour irregularity.    Pain: No pain reported during RD interview; 0 per chart review    Skin Integrity:  Rudy 18    Labs:   08-14    133<L>  |  100  |  29<H>  ----------------------------<  112<H>  4.1   |  26  |  0.88    Ca    8.1<L>      14 Aug 2023 07:31  Phos  4.4     08-14  Mg     2.0     08-14    TPro  7.5  /  Alb  2.1<L>  /  TBili  3.6<H>  /  DBili  x   /  AST  91<H>  /  ALT  88<H>  /  AlkPhos  157<H>  08-14    Medications:  MEDICATIONS  (STANDING):  aMIOdarone    Tablet 200 milliGRAM(s) Oral daily  amLODIPine   Tablet 10 milliGRAM(s) Oral daily  atorvastatin 20 milliGRAM(s) Oral at bedtime  chlorhexidine 2% Cloths 1 Application(s) Topical <User Schedule>  cholestyramine Powder (Sugar-Free) 4 Gram(s) Oral two times a day  glucagon  Injectable 1 milliGRAM(s) IntraMuscular once  heparin   Injectable 5000 Unit(s) SubCutaneous every 8 hours  levothyroxine 50 MICROGram(s) Oral daily  lipid, fat emulsion (Fish Oil and Plant Based) 20% Infusion 0.495 Gm/kG/Day (20.8 mL/Hr) IV Continuous <Continuous>  loperamide 4 milliGRAM(s) Oral every 12 hours  losartan 25 milliGRAM(s) Oral daily  metoprolol tartrate 25 milliGRAM(s) Oral every 12 hours  nystatin Powder 1 Application(s) Topical three times a day  octreotide  Injectable 100 MICROGram(s) IV Push every 8 hours  pantoprazole  Injectable 40 milliGRAM(s) IV Push daily  Parenteral Nutrition - Adult 1 Each (96 mL/Hr) TPN Continuous <Continuous>  Parenteral Nutrition - Adult 1 Each (96 mL/Hr) TPN Continuous <Continuous>  venlafaxine XR. 300 milliGRAM(s) Oral daily    MEDICATIONS  (PRN):  benzocaine/menthol Lozenge 2 Lozenge Oral every 4 hours PRN Sore Throat  melatonin 5 milliGRAM(s) Oral at bedtime PRN Sleep    Anthropometrics:  Weight: 101kg [5 July 2023]  Daily   99.9kg [9 July 2023]  Daily 102.1kg [10 July 2023]  Dosing Wt 8/9 - 101kg     IBW: 86.4kg    % IBW: 118.2% IBW    Nutrition Focused Physical Exam: Completed [   ]  Not Pertinent [ x ]  > No overt s/sx of muscle mass/body fat depletion observed during RD interview    Estimated energy needs:   Ideal body weight (86.4kg) used for calculations as pt >100% of IBW, BMI <30 per [facility] Standards of Care. Needs estimated for age and adjusted for current clinical status, increased needs for post-op & open abd wound healing    Calories: 1160-9544 kcals based on 30-35 kcals/kg  Protein: 172.8-216 g protein based on 2.0-2.5g protein/kg  *Fluid needs per team    Subjective:   78M w/ Crohn's, AFib/Flutter s/p DCCVs on amiodarone, remote ileocectomy and open appy here for SBO vs Crohns flare, s/p NGT decompression and now s/p lap TRE converted to open TRE, SBR x 3, left in discontinuity with abthera vac on 6/27, RTOR for ileocolic resection, small bowel anastomosis, and abdominal wall closure on 6/28, and s/p IR aspiration of perihepatic fluid on 7/3, stepped down to telemetry on 7/4. wound dehiscence on 7/5, RTOR ex-lap, washout, ileocolic resection with end ileostomy, blow hole colostomy, red rubber from ileostomy to small bowel anastomosis; Vicryl bridging mesh on 7/5. Transferred to SICU post op for HD monitoring. Extubated 7/6. Surgical wound with decreasing in size and granulating with Vac. Decreasing fistula output. Red rubber replaced. Wound vac pending today 8/14.    Pt seen by RDN on 8LA for follow up nutrition assessment. On assessment, pt resting in bed with TPN infusing at 96mL/hr. No new report of intolerance to parenteral nutrition support. Denies N/V. Pt reported feeling hungry during RD interview. Discussed plan to advance diet as medically feasible. Of note, pt seen by SLP on 7/14 and cleared for soft & bite sized diet as appropriate. Pt made aware RDN remains available. Will continue to reassess, intervene, and monitor as appropriate.     Previous Nutrition Diagnosis:  Increased Nutrient Needs R/T physiological demands for nutrient AEB post-op wound healing    Active [ x ]  Resolved [   ]    Goal: Pt will meet at least 75% of protein & energy needs via most appropriate route for nutrition    Recommendations:  -Continue TPN order via PICC line: 427 gDex, 175 gAA, and 50 gSMOF providing 2651.8 kcals, 175g protein, with GIR 2.9 mg/kg/min. This is 30.6 kcals/kg (22.6 nonprotein kcal) and 2.0 g protein/kg ideal body weight of 86.4kg.  -Continue daily BMP with Mg and Phos, BG checks daily   -Weigh daily   -I/O's daily   -6hr Post infusion serum TG's weekly   -Advance diet as tolerated per medical/surgical teams   -Monitor chemistry, GI function, and skin integrity    Risk Level: High [ x ] Moderate [   ] Low [   ]

## 2023-08-14 NOTE — PROGRESS NOTE ADULT - SUBJECTIVE AND OBJECTIVE BOX
SUBJECTIVE:   Patient seen and examined at bedside this AM   Red rubber fell out over the weekend and this AM   Otherwise well appearing without complaints or concerns   Voiding spontaneously   Output per ileostomy       MEDICATIONS  (STANDING):  aMIOdarone    Tablet 200 milliGRAM(s) Oral daily  amLODIPine   Tablet 10 milliGRAM(s) Oral daily  atorvastatin 20 milliGRAM(s) Oral at bedtime  chlorhexidine 2% Cloths 1 Application(s) Topical <User Schedule>  cholestyramine Powder (Sugar-Free) 4 Gram(s) Oral two times a day  glucagon  Injectable 1 milliGRAM(s) IntraMuscular once  heparin   Injectable 5000 Unit(s) SubCutaneous every 8 hours  levothyroxine 50 MICROGram(s) Oral daily  lipid, fat emulsion (Fish Oil and Plant Based) 20% Infusion 0.495 Gm/kG/Day (20.8 mL/Hr) IV Continuous <Continuous>  loperamide 4 milliGRAM(s) Oral every 12 hours  losartan 25 milliGRAM(s) Oral daily  metoprolol tartrate 25 milliGRAM(s) Oral every 12 hours  nystatin Powder 1 Application(s) Topical three times a day  octreotide  Injectable 100 MICROGram(s) IV Push every 8 hours  pantoprazole  Injectable 40 milliGRAM(s) IV Push daily  Parenteral Nutrition - Adult 1 Each (96 mL/Hr) TPN Continuous <Continuous>  venlafaxine XR. 300 milliGRAM(s) Oral daily    MEDICATIONS  (PRN):  benzocaine/menthol Lozenge 2 Lozenge Oral every 4 hours PRN Sore Throat  melatonin 5 milliGRAM(s) Oral at bedtime PRN Sleep      Vital Signs Last 24 Hrs  T(C): 36.4 (14 Aug 2023 05:12), Max: 37.7 (13 Aug 2023 09:00)  T(F): 97.5 (14 Aug 2023 05:12), Max: 99.8 (13 Aug 2023 09:00)  HR: 80 (14 Aug 2023 03:40) (78 - 81)  BP: 121/59 (14 Aug 2023 03:40) (105/59 - 147/63)  BP(mean): 85 (14 Aug 2023 03:40) (73 - 90)  RR: 17 (14 Aug 2023 03:40) (17 - 18)  SpO2: 94% (14 Aug 2023 03:40) (92% - 97%)    Parameters below as of 14 Aug 2023 03:40  Patient On (Oxygen Delivery Method): room air        Physical Exam:  General: NAD, resting comfortably in bed  Pulmonary: Nonlabored breathing, no respiratory distress  Cardiovascular: NSR  Abdominal: soft, NT/ND  Extremities: WWP, normal strength  Neuro: A/O x 3, CNs II-XII grossly intact, no focal deficits    I&O's Summary    13 Aug 2023 07:01  -  14 Aug 2023 07:00  --------------------------------------------------------  IN: 3574.4 mL / OUT: 2795 mL / NET: 779.4 mL    14 Aug 2023 07:01  -  14 Aug 2023 07:30  --------------------------------------------------------  IN: 96 mL / OUT: 0 mL / NET: 96 mL        LABS:    08-13    132<L>  |  102  |  28<H>  ----------------------------<  109<H>  4.9   |  23  |  0.81    Ca    8.5      13 Aug 2023 05:30  Phos  4.3     08-13  Mg     1.9     08-13        Urinalysis Basic - ( 13 Aug 2023 05:30 )    Color: x / Appearance: x / SG: x / pH: x  Gluc: 109 mg/dL / Ketone: x  / Bili: x / Urobili: x   Blood: x / Protein: x / Nitrite: x   Leuk Esterase: x / RBC: x / WBC x   Sq Epi: x / Non Sq Epi: x / Bacteria: x      CAPILLARY BLOOD GLUCOSE            RADIOLOGY & ADDITIONAL STUDIES:   SUBJECTIVE:   Patient seen and examined at bedside this AM   Red rubber fell out over the weekend and this AM   Otherwise well appearing without complaints or concerns   Voiding spontaneously   Output per ileostomy       MEDICATIONS  (STANDING):  aMIOdarone    Tablet 200 milliGRAM(s) Oral daily  amLODIPine   Tablet 10 milliGRAM(s) Oral daily  atorvastatin 20 milliGRAM(s) Oral at bedtime  chlorhexidine 2% Cloths 1 Application(s) Topical <User Schedule>  cholestyramine Powder (Sugar-Free) 4 Gram(s) Oral two times a day  glucagon  Injectable 1 milliGRAM(s) IntraMuscular once  heparin   Injectable 5000 Unit(s) SubCutaneous every 8 hours  levothyroxine 50 MICROGram(s) Oral daily  lipid, fat emulsion (Fish Oil and Plant Based) 20% Infusion 0.495 Gm/kG/Day (20.8 mL/Hr) IV Continuous <Continuous>  loperamide 4 milliGRAM(s) Oral every 12 hours  losartan 25 milliGRAM(s) Oral daily  metoprolol tartrate 25 milliGRAM(s) Oral every 12 hours  nystatin Powder 1 Application(s) Topical three times a day  octreotide  Injectable 100 MICROGram(s) IV Push every 8 hours  pantoprazole  Injectable 40 milliGRAM(s) IV Push daily  Parenteral Nutrition - Adult 1 Each (96 mL/Hr) TPN Continuous <Continuous>  venlafaxine XR. 300 milliGRAM(s) Oral daily    MEDICATIONS  (PRN):  benzocaine/menthol Lozenge 2 Lozenge Oral every 4 hours PRN Sore Throat  melatonin 5 milliGRAM(s) Oral at bedtime PRN Sleep      Vital Signs Last 24 Hrs  T(C): 36.4 (14 Aug 2023 05:12), Max: 37.7 (13 Aug 2023 09:00)  T(F): 97.5 (14 Aug 2023 05:12), Max: 99.8 (13 Aug 2023 09:00)  HR: 80 (14 Aug 2023 03:40) (78 - 81)  BP: 121/59 (14 Aug 2023 03:40) (105/59 - 147/63)  BP(mean): 85 (14 Aug 2023 03:40) (73 - 90)  RR: 17 (14 Aug 2023 03:40) (17 - 18)  SpO2: 94% (14 Aug 2023 03:40) (92% - 97%)    Parameters below as of 14 Aug 2023 03:40  Patient On (Oxygen Delivery Method): room air        Physical Exam:  General: NAD, resting comfortably in bed   Pulmonary: Nonlabored breathing, no respiratory distress in room air; 1000 mL on IS  Cardiovascular: NSR  Abdominal: soft, vac with visible suction, red rubber drain to suction in fistula with surrounding wound manager to gravity, JOYCE drain left abdomen, ileostomy pink, patent and productive of loose enteric content   Extremities: WWP, normal strength  Neuro: A/O x 3, no focal deficits, normal motor/sensation      I&O's Summary    13 Aug 2023 07:01  -  14 Aug 2023 07:00  --------------------------------------------------------  IN: 3574.4 mL / OUT: 2795 mL / NET: 779.4 mL    14 Aug 2023 07:01  -  14 Aug 2023 07:30  --------------------------------------------------------  IN: 96 mL / OUT: 0 mL / NET: 96 mL        LABS:    08-13    132<L>  |  102  |  28<H>  ----------------------------<  109<H>  4.9   |  23  |  0.81    Ca    8.5      13 Aug 2023 05:30  Phos  4.3     08-13  Mg     1.9     08-13        Urinalysis Basic - ( 13 Aug 2023 05:30 )    Color: x / Appearance: x / SG: x / pH: x  Gluc: 109 mg/dL / Ketone: x  / Bili: x / Urobili: x   Blood: x / Protein: x / Nitrite: x   Leuk Esterase: x / RBC: x / WBC x   Sq Epi: x / Non Sq Epi: x / Bacteria: x      CAPILLARY BLOOD GLUCOSE            RADIOLOGY & ADDITIONAL STUDIES:

## 2023-08-14 NOTE — PROGRESS NOTE ADULT - ASSESSMENT
78M w/ Crohn's, AFib/Flutter s/p DCCVs on amiodarone, remote ileocectomy and open appy here for SBO vs Crohns flare, s/p NGT decompression and now s/p lap TRE converted to open TRE, SBR x 3, left in discontinuity with abthera vac on 6/27, RTOR for ileocolic resection, small bowel anastomosis, and abdominal wall closure on 6/28, and s/p IR aspiration of perihepatic fluid on 7/3, stepped down to telemetry on 7/4. wound dehiscence on 7/5, RTOR ex-lap, washout, ileocolic resection with end ileostomy, blow hole colostomy, red rubber from ileostomy to small bowel anastomosis; Vicryl bridging mesh on 7/5. Transferred to SICU post op for HD monitoring. Extubated 7/6. Surgical wound with decreasing in size and granulating with Vac. Decreasing fistula output     Plan:   - NPO w/Sips & chips; continue TPN   - Wound vac change Monday & Friday   - Monitor fistula drain output; continue Loperamide   - PRN pain control   - Hx of A-fib/flutter; continue rate and rhythm control   - Encourage IS/OOB   - DVT ppx  78M w/ Crohn's, AFib/Flutter s/p DCCVs on amiodarone, remote ileocectomy and open appy here for SBO vs Crohns flare, s/p NGT decompression and now s/p lap TRE converted to open TRE, SBR x 3, left in discontinuity with abthera vac on 6/27, RTOR for ileocolic resection, small bowel anastomosis, and abdominal wall closure on 6/28, and s/p IR aspiration of perihepatic fluid on 7/3, stepped down to telemetry on 7/4. wound dehiscence on 7/5, RTOR ex-lap, washout, ileocolic resection with end ileostomy, blow hole colostomy, red rubber from ileostomy to small bowel anastomosis; Vicryl bridging mesh on 7/5. Transferred to SICU post op for HD monitoring. Extubated 7/6. Surgical wound with decreasing in size and granulating with Vac. Decreasing fistula output     Plan:   - NPO w/Sips & chips; continue TPN   - Wound vac change Monday & Friday   - Monitor fistula drain output; continue Loperamide   - PRN pain control   - Hx of A-fib/flutter; continue rate and rhythm control   - Encourage IS/OOB   - DVT ppx   - PT     Attending aware and agrees with plan

## 2023-08-14 NOTE — PROGRESS NOTE ADULT - SUBJECTIVE AND OBJECTIVE BOX
Patient evaluated with chief resident during AM rounds    STATUS POST:  Exploratory laparotomy    Laparoscopic lysis of intestinal adhesions    Exploratory laparotomy    Open lysis of intestinal adhesions    Resection of small bowel    Temporary closure of abdominal cavity    Repair, anastomosis, ileocolic    Small bowel resection with anastomosis    Flap, myocutaneous, abdominal wall    Reconstruction of abdominal wall using mesh    Washout of abdominal cavity    Creation of ileostomy    Creation of colostomy    Laparoscopic lysis of intestinal adhesions    Open lysis of intestinal adhesions    Resection of small bowel    Temporary closure of abdominal cavity    Repair, anastomosis, ileocolic    Small bowel resection with anastomosis    Flap, myocutaneous, abdominal wall    Reconstruction of abdominal wall using mesh    Washout of abdominal cavity        Overnight Events: RR fell out, replaced without issue.   SUBJECTIVE: This AM patient seen upright in bed on rounds. Comfortable with no complaints. NPO with sips and chips/meds. TPN daily. Ambulates to chair with assistance. Ostomy putting out, voiding without issue.     Denies Chest Pain, Difficulty breathing, Calf Pain, Headache, Dizziness    OBJECTIVE:  Vital Signs Last 24 Hrs  T(C): 36.4 (14 Aug 2023 05:12), Max: 36.9 (13 Aug 2023 14:00)  T(F): 97.5 (14 Aug 2023 05:12), Max: 98.5 (13 Aug 2023 14:00)  HR: 78 (14 Aug 2023 10:00) (78 - 80)  BP: 111/62 (14 Aug 2023 10:00) (105/59 - 147/63)  BP(mean): 80 (14 Aug 2023 10:00) (73 - 90)  RR: 17 (14 Aug 2023 10:00) (17 - 18)  SpO2: 94% (14 Aug 2023 10:00) (92% - 97%)    Parameters below as of 14 Aug 2023 10:00  Patient On (Oxygen Delivery Method): room air        I&O's Summary    13 Aug 2023 07:01  -  14 Aug 2023 07:00  --------------------------------------------------------  IN: 3553.6 mL / OUT: 2795 mL / NET: 758.6 mL    14 Aug 2023 07:01  -  14 Aug 2023 11:29  --------------------------------------------------------  IN: 288 mL / OUT: 1100 mL / NET: -812 mL        Physical Exam:  General Appearance: Appears well, NAD  Pulmonary: Nonlabored breathing, no respiratory distress  Cardiovascular: NSR  Abdomen: Soft, nondistneded, appropriate incisional tenderness, dressings clean and dry and intact. WOund vac in place functioning. Fistula and Ostomy putting out. Ostomy PPP. Red rubber in place.   Extremities: WWP, SCD's in place     LABS:    08-14    133<L>  |  100  |  29<H>  ----------------------------<  112<H>  4.1   |  26  |  0.88    Ca    8.1<L>      14 Aug 2023 07:31  Phos  4.4     08-14  Mg     2.0     08-14    TPro  7.5  /  Alb  2.1<L>  /  TBili  3.6<H>  /  DBili  x   /  AST  91<H>  /  ALT  88<H>  /  AlkPhos  157<H>  08-14      Urinalysis Basic - ( 14 Aug 2023 07:31 )    Color: x / Appearance: x / SG: x / pH: x  Gluc: 112 mg/dL / Ketone: x  / Bili: x / Urobili: x   Blood: x / Protein: x / Nitrite: x   Leuk Esterase: x / RBC: x / WBC x   Sq Epi: x / Non Sq Epi: x / Bacteria: x

## 2023-08-15 NOTE — PROGRESS NOTE ADULT - SUBJECTIVE AND OBJECTIVE BOX
Basic metabolic panel normal  Output from fistula stable  Vital signs stable  A febrile  Due for wound change on Friday  Out of bed  Pulmonary toilet  Remain off antibiotics

## 2023-08-15 NOTE — PROGRESS NOTE ADULT - ASSESSMENT
78M w/ Crohn's, AFib/Flutter s/p DCCVs on amiodarone, remote ileocectomy and open appy here for SBO vs Crohns flare, s/p NGT decompression and now s/p lap TRE converted to open TRE, SBR x 3, left in discontinuity with abthera vac on 6/27, RTOR for ileocolic resection, small bowel anastomosis, and abdominal wall closure on 6/28, and s/p IR aspiration of perihepatic fluid on 7/3, stepped down to telemetry on 7/4. wound dehiscence on 7/5, RTOR ex-lap, washout, ileocolic resection with end ileostomy, blow hole colostomy, red rubber from ileostomy to small bowel anastomosis; Vicryl bridging mesh on 7/5. Transferred to SICU post op for HD monitoring. Extubated 7/6. Surgical wound with decreasing in size and granulating with Vac. Decreasing fistula output. Red rubber replaced. Wound vac changed Monday 8/14. Pending change 8/18.     Plan:   - NPO w/Sips & chips; continue TPN   - Wound vac pending this Friday 8/18  - Monitor fistula drain output; continue Loperamide   - PRN pain control   - Hx of A-fib/flutter; continue rate and rhythm control   - Encourage IS/OOB   - DVT ppx

## 2023-08-15 NOTE — PROGRESS NOTE ADULT - SUBJECTIVE AND OBJECTIVE BOX
Patient evaluated with chief resident during AM rounds    STATUS POST:  Exploratory laparotomy    Laparoscopic lysis of intestinal adhesions    Exploratory laparotomy    Open lysis of intestinal adhesions    Resection of small bowel    Temporary closure of abdominal cavity    Repair, anastomosis, ileocolic    Small bowel resection with anastomosis    Flap, myocutaneous, abdominal wall    Reconstruction of abdominal wall using mesh    Washout of abdominal cavity    Creation of ileostomy    Creation of colostomy    Laparoscopic lysis of intestinal adhesions    Open lysis of intestinal adhesions    Resection of small bowel    Temporary closure of abdominal cavity    Repair, anastomosis, ileocolic    Small bowel resection with anastomosis    Flap, myocutaneous, abdominal wall    Reconstruction of abdominal wall using mesh    Washout of abdominal cavity      Overnight Events: NAEON. Drains functioning properly. Vac changed yesterday  SUBJECTIVE: Patient seen on AM rounds comfortable. No complaints. NPO with sips and chips/meds. TPN daily. AMbulates to chair with asssitance. Ostomy PPP, voiding without issues.     Denies Chest Pain, Difficulty breathing, Calf Pain, Headache, Dizziness    OBJECTIVE:  Vital Signs Last 24 Hrs  T(C): 36.4 (15 Aug 2023 09:21), Max: 36.6 (14 Aug 2023 13:00)  T(F): 97.5 (15 Aug 2023 09:21), Max: 97.9 (14 Aug 2023 13:00)  HR: 78 (15 Aug 2023 08:37) (76 - 82)  BP: 128/66 (15 Aug 2023 08:37) (99/58 - 130/63)  BP(mean): 91 (15 Aug 2023 08:37) (72 - 91)  RR: 18 (15 Aug 2023 08:37) (16 - 18)  SpO2: 97% (15 Aug 2023 08:37) (95% - 99%)    Parameters below as of 15 Aug 2023 08:37  Patient On (Oxygen Delivery Method): room air        I&O's Summary    14 Aug 2023 07:01  -  15 Aug 2023 07:00  --------------------------------------------------------  IN: 2595.2 mL / OUT: 3472 mL / NET: -876.8 mL        Physical Exam:  General Appearance: Appears well, NAD  Pulmonary: Nonlabored breathing, no respiratory distress  Cardiovascular: NSR  Abdomen: Soft, nondistneded, appropriate incisional tenderness, dressings clean and dry and intact. WOund vac in place functioning. Fistula and Ostomy putting out. Ostomy PPP. Red rubber in place.   Extremities: WWP, SCD's in place     LABS:    08-15    137  |  102  |  27<H>  ----------------------------<  98  3.9   |  27  |  0.79    Ca    8.4      15 Aug 2023 05:30  Phos  4.3     08-15  Mg     2.0     08-15    TPro  7.5  /  Alb  2.1<L>  /  TBili  3.4<H>  /  DBili  x   /  AST  108<H>  /  ALT  99<H>  /  AlkPhos  148<H>  08-15      Urinalysis Basic - ( 15 Aug 2023 05:30 )    Color: x / Appearance: x / SG: x / pH: x  Gluc: 98 mg/dL / Ketone: x  / Bili: x / Urobili: x   Blood: x / Protein: x / Nitrite: x   Leuk Esterase: x / RBC: x / WBC x   Sq Epi: x / Non Sq Epi: x / Bacteria: x

## 2023-08-15 NOTE — PROGRESS NOTE ADULT - ASSESSMENT
78M w/ Crohn's, AFib/Flutter s/p DCCVs on amiodarone, remote ileocectomy and open appy here for SBO vs Crohns flare, s/p NGT decompression and now s/p lap TRE converted to open TRE, SBR x 3, left in discontinuity with abthera vac on 6/27, RTOR for ileocolic resection, small bowel anastomosis, and abdominal wall closure on 6/28, and s/p IR aspiration of perihepatic fluid on 7/3, stepped down to telemetry on 7/4. wound dehiscence on 7/5, RTOR ex-lap, washout, ileocolic resection with end ileostomy, blow hole colostomy, red rubber from ileostomy to small bowel anastomosis; Vicryl bridging mesh on 7/5. Transferred to SICU post op for HD monitoring. Extubated 7/6. Surgical wound with decreasing in size and granulating with Vac. Decreasing fistula output     Plan:   - NPO w/Sips & chips; continue TPN   - Wound vac change Monday & Friday   - Monitor fistula drain output   - PRN pain control   - Hx of A-fib/flutter; continue rate and rhythm control   - Encourage IS/OOB   - DVT ppx   - PT

## 2023-08-15 NOTE — PROGRESS NOTE ADULT - SUBJECTIVE AND OBJECTIVE BOX
SUBJECTIVE:   Patient seen and examined at bedside this AM   No acute overnight events   Denied abdominal pain, nausea, chills or sob   NPO w/ sips & chips   Voiding spontaneously   Output per ileostomy       MEDICATIONS  (STANDING):  aMIOdarone    Tablet 200 milliGRAM(s) Oral daily  amLODIPine   Tablet 10 milliGRAM(s) Oral daily  atorvastatin 20 milliGRAM(s) Oral at bedtime  chlorhexidine 2% Cloths 1 Application(s) Topical <User Schedule>  cholestyramine Powder (Sugar-Free) 4 Gram(s) Oral two times a day  glucagon  Injectable 1 milliGRAM(s) IntraMuscular once  heparin   Injectable 5000 Unit(s) SubCutaneous every 8 hours  levothyroxine 50 MICROGram(s) Oral daily  lipid, fat emulsion (Fish Oil and Plant Based) 20% Infusion 0.495 Gm/kG/Day (20.8 mL/Hr) IV Continuous <Continuous>  loperamide 4 milliGRAM(s) Oral every 12 hours  losartan 25 milliGRAM(s) Oral daily  metoprolol tartrate 25 milliGRAM(s) Oral every 12 hours  nystatin Powder 1 Application(s) Topical three times a day  octreotide  Injectable 100 MICROGram(s) IV Push every 8 hours  pantoprazole  Injectable 40 milliGRAM(s) IV Push daily  Parenteral Nutrition - Adult 1 Each (96 mL/Hr) TPN Continuous <Continuous>  venlafaxine XR. 300 milliGRAM(s) Oral daily    MEDICATIONS  (PRN):  benzocaine/menthol Lozenge 2 Lozenge Oral every 4 hours PRN Sore Throat  melatonin 5 milliGRAM(s) Oral at bedtime PRN Sleep      Vital Signs Last 24 Hrs  T(C): 36.4 (15 Aug 2023 05:02), Max: 36.6 (14 Aug 2023 13:00)  T(F): 97.6 (15 Aug 2023 05:02), Max: 97.9 (14 Aug 2023 13:00)  HR: 76 (15 Aug 2023 05:25) (76 - 82)  BP: 117/59 (15 Aug 2023 05:25) (99/58 - 130/63)  BP(mean): 82 (15 Aug 2023 05:25) (72 - 91)  RR: 16 (15 Aug 2023 03:31) (16 - 18)  SpO2: 97% (15 Aug 2023 05:25) (94% - 99%)    Parameters below as of 15 Aug 2023 03:31  Patient On (Oxygen Delivery Method): room air        Physical Exam:  General: NAD, resting comfortably in bed   Pulmonary: Nonlabored breathing, no respiratory distress in room air; 1000 mL on IS  Cardiovascular: NSR  Abdominal: soft, vac with visible suction, red rubber drain to suction in fistula with surrounding wound manager to gravity, JOYCE drain left abdomen, ileostomy pink, patent and productive of loose enteric content   Extremities: WWP, normal strength  Neuro: A/O x 3, no focal deficits, normal motor/sensation       I&O's Summary    14 Aug 2023 07:01  -  15 Aug 2023 07:00  --------------------------------------------------------  IN: 2595.2 mL / OUT: 3272 mL / NET: -676.8 mL        LABS:    08-15    137  |  102  |  27<H>  ----------------------------<  98  3.9   |  27  |  0.79    Ca    8.4      15 Aug 2023 05:30  Phos  4.3     08-15  Mg     2.0     08-15    TPro  7.5  /  Alb  2.1<L>  /  TBili  3.4<H>  /  DBili  x   /  AST  108<H>  /  ALT  99<H>  /  AlkPhos  148<H>  08-15      Urinalysis Basic - ( 15 Aug 2023 05:30 )    Color: x / Appearance: x / SG: x / pH: x  Gluc: 98 mg/dL / Ketone: x  / Bili: x / Urobili: x   Blood: x / Protein: x / Nitrite: x   Leuk Esterase: x / RBC: x / WBC x   Sq Epi: x / Non Sq Epi: x / Bacteria: x      CAPILLARY BLOOD GLUCOSE        LIVER FUNCTIONS - ( 15 Aug 2023 05:30 )  Alb: 2.1 g/dL / Pro: 7.5 g/dL / ALK PHOS: 148 U/L / ALT: 99 U/L / AST: 108 U/L / GGT: x             RADIOLOGY & ADDITIONAL STUDIES:

## 2023-08-16 NOTE — PROGRESS NOTE ADULT - SUBJECTIVE AND OBJECTIVE BOX
SUBJECTIVE: Pt seen and examined at bedside with chief. Pain well controlled. Vac lost suction overnight.    MEDICATIONS  (STANDING):  aMIOdarone    Tablet 200 milliGRAM(s) Oral daily  amLODIPine   Tablet 10 milliGRAM(s) Oral daily  atorvastatin 20 milliGRAM(s) Oral at bedtime  chlorhexidine 2% Cloths 1 Application(s) Topical <User Schedule>  cholestyramine Powder (Sugar-Free) 4 Gram(s) Oral two times a day  glucagon  Injectable 1 milliGRAM(s) IntraMuscular once  heparin   Injectable 5000 Unit(s) SubCutaneous every 8 hours  levothyroxine 50 MICROGram(s) Oral daily  lipid, fat emulsion (Fish Oil and Plant Based) 20% Infusion 0.495 Gm/kG/Day (20.83 mL/Hr) IV Continuous <Continuous>  loperamide 4 milliGRAM(s) Oral every 12 hours  losartan 25 milliGRAM(s) Oral daily  metoprolol tartrate 25 milliGRAM(s) Oral every 12 hours  nystatin Powder 1 Application(s) Topical three times a day  octreotide  Injectable 100 MICROGram(s) IV Push every 8 hours  pantoprazole  Injectable 40 milliGRAM(s) IV Push daily  Parenteral Nutrition - Adult 1 Each (96 mL/Hr) TPN Continuous <Continuous>  venlafaxine XR. 300 milliGRAM(s) Oral daily    MEDICATIONS  (PRN):  benzocaine/menthol Lozenge 2 Lozenge Oral every 4 hours PRN Sore Throat  melatonin 5 milliGRAM(s) Oral at bedtime PRN Sleep      Vital Signs Last 24 Hrs  T(C): 36.3 (16 Aug 2023 05:06), Max: 36.9 (15 Aug 2023 22:26)  T(F): 97.3 (16 Aug 2023 05:06), Max: 98.4 (15 Aug 2023 22:26)  HR: 76 (16 Aug 2023 05:05) (76 - 82)  BP: 116/56 (16 Aug 2023 05:05) (112/55 - 136/63)  BP(mean): 80 (16 Aug 2023 05:05) (77 - 94)  RR: 16 (16 Aug 2023 05:05) (16 - 18)  SpO2: 94% (16 Aug 2023 05:05) (94% - 100%)    Parameters below as of 15 Aug 2023 21:05  Patient On (Oxygen Delivery Method): room air        PHYSICAL EXAM:      Constitutional: A&Ox3    Respiratory: non labored breathing, no respiratory distress    Cardiovascular: NSR, RRR    Gastrointestinal:  soft, vac with visible suction but machine with leak., red rubber drain to suction in fistula with surrounding wound manager to gravity, JOYCE drain left abdomen, ileostomy pink, patent and productive of loose enteric content       Genitourinary:    Extremities: (-) edema                  I&O's Detail    15 Aug 2023 07:01  -  16 Aug 2023 07:00  --------------------------------------------------------  IN:    Fat Emulsion 20%: 270.4 mL    Oral Fluid: 300 mL    TPN (Total Parenteral Nutrition): 2304 mL  Total IN: 2874.4 mL    OUT:    Bulb (mL): 20 mL    Drain (mL): 100 mL    Drain (mL): 250 mL    Ileostomy (mL): 50 mL    VAC (Vacuum Assisted Closure) System (mL): 300 mL    Voided (mL): 2275 mL  Total OUT: 2995 mL    Total NET: -120.6 mL          LABS:    08-15    137  |  102  |  27<H>  ----------------------------<  98  3.9   |  27  |  0.79    Ca    8.4      15 Aug 2023 05:30  Phos  4.3     08-15  Mg     2.0     08-15    TPro  7.5  /  Alb  2.1<L>  /  TBili  3.4<H>  /  DBili  x   /  AST  108<H>  /  ALT  99<H>  /  AlkPhos  148<H>  08-15      Urinalysis Basic - ( 15 Aug 2023 05:30 )    Color: x / Appearance: x / SG: x / pH: x  Gluc: 98 mg/dL / Ketone: x  / Bili: x / Urobili: x   Blood: x / Protein: x / Nitrite: x   Leuk Esterase: x / RBC: x / WBC x   Sq Epi: x / Non Sq Epi: x / Bacteria: x        RADIOLOGY & ADDITIONAL STUDIES:

## 2023-08-16 NOTE — PROGRESS NOTE ADULT - ASSESSMENT

## 2023-08-16 NOTE — PROGRESS NOTE ADULT - ASSESSMENT
78M w/ Crohn's, AFib/Flutter s/p DCCVs on amiodarone, remote ileocectomy and open appy here for SBO vs Crohns flare, s/p NGT decompression and now s/p lap TRE converted to open TRE, SBR x 3, left in discontinuity with abthera vac on 6/27, RTOR for ileocolic resection, small bowel anastomosis, and abdominal wall closure on 6/28, and s/p IR aspiration of perihepatic fluid on 7/3, stepped down to telemetry on 7/4. wound dehiscence on 7/5, RTOR ex-lap, washout, ileocolic resection with end ileostomy, blow hole colostomy, red rubber from ileostomy to small bowel anastomosis; Vicryl bridging mesh on 7/5. Transferred to SICU post op for HD monitoring. Extubated 7/6. Surgical wound with decreasing in size and granulating with Vac. Vac changes Monday & Friday.     Plan:   - NPO w/Sips & chips; continue TPN   - Wound vac change Monday & Friday   - Monitor fistula & drain output   - PRN pain control   - Hx of A-fib/flutter; continue rate and rhythm control   - Encourage IS/OOB/ambulation  - DVT ppx   - PT

## 2023-08-16 NOTE — CHART NOTE - NSCHARTNOTEFT_GEN_A_CORE
Admitting Diagnosis:   Patient is a 78y old  Male who presents with a chief complaint of sbo (07 Aug 2023 14:14)      PAST MEDICAL & SURGICAL HISTORY:  Essential hypertension  Hypertension      Atrial fibrillation  s/p cardioversion 2010 and 2014  Pt. reports 4 DCCV  Now on Amiodarone 200mg PO bid and Eliquis 5mg PO bid  [facility]      Crohn's disease  s/p partial resection of ileum      Hyperlipidemia      Hypothyroidism      History of depression  On Venlafaxine ER 150mg PO bid      H/O knee surgery      History of cataract surgery        Current TPN Order via PICC: 427 g Dextrose, 175 g amino acids, 50 g SMOF lipids providing 2652kcals, 175g protein, 2g/kg IBW protein     PO Intake: Good (%) [   ]  Fair (50-75%) [   ] Poor (<25%) [   ] - - N/A, NPO on TPN    GI Issues: Denies N/V  Ileostomy w/120cc output  Abdominal drain  EC fistula  LUQ drain    Pain: Denied complaints of pain    Skin Integrity:  Rudy 19, no edema  Abdominal surgical wounds w/vac    Labs:   08-16    132<L>  |  100  |  28<H>  ----------------------------<  130<H>  3.4<L>   |  25  |  0.71    Ca    7.8<L>      16 Aug 2023 08:35  Phos  4.0     08-16  Mg     1.9     08-16    TPro  7.5  /  Alb  1.9<L>  /  TBili  3.1<H>  /  DBili  x   /  AST  90<H>  /  ALT  95<H>  /  AlkPhos  159<H>  08-16    CAPILLARY BLOOD GLUCOSE          Medications:  MEDICATIONS  (STANDING):  aMIOdarone    Tablet 200 milliGRAM(s) Oral daily  amLODIPine   Tablet 10 milliGRAM(s) Oral daily  atorvastatin 20 milliGRAM(s) Oral at bedtime  chlorhexidine 2% Cloths 1 Application(s) Topical <User Schedule>  cholestyramine Powder (Sugar-Free) 4 Gram(s) Oral two times a day  glucagon  Injectable 1 milliGRAM(s) IntraMuscular once  heparin   Injectable 5000 Unit(s) SubCutaneous every 8 hours  levothyroxine 50 MICROGram(s) Oral daily  lipid, fat emulsion (Fish Oil and Plant Based) 20% Infusion 0.495 Gm/kG/Day (20.83 mL/Hr) IV Continuous <Continuous>  loperamide 4 milliGRAM(s) Oral every 12 hours  losartan 25 milliGRAM(s) Oral daily  metoprolol tartrate 25 milliGRAM(s) Oral every 12 hours  nystatin Powder 1 Application(s) Topical three times a day  octreotide  Injectable 100 MICROGram(s) IV Push every 8 hours  pantoprazole  Injectable 40 milliGRAM(s) IV Push daily  Parenteral Nutrition - Adult 1 Each (96 mL/Hr) TPN Continuous <Continuous>  Parenteral Nutrition - Adult 1 Each (96 mL/Hr) TPN Continuous <Continuous>  venlafaxine XR. 300 milliGRAM(s) Oral daily    MEDICATIONS  (PRN):  benzocaine/menthol Lozenge 2 Lozenge Oral every 4 hours PRN Sore Throat  melatonin 5 milliGRAM(s) Oral at bedtime PRN Sleep      Anthropometrics:  Weight: 101kg [5 July 2023]  Daily   99.9kg [9 July 2023]  Daily 102.1kg [10 July 2023]  Dosing Wt 8/9 - 101kg   **Please try to take biweekly weights**    IBW: 86.4kg    % IBW: 118.2% IBW    Nutrition Focused Physical Exam: Completed [   ]  Not Pertinent [ x ]  > No overt s/sx of muscle mass/body fat depletion observed during RD interview    Estimated energy needs:   Ideal body weight (86.4kg) used for calculations as pt >100% of IBW, BMI <30 per [facility] Standards of Care. Needs estimated for age and adjusted for current clinical status, increased needs for post-op & open abd wound healing    Calories: 0130-0058 kcals based on 30-35 kcals/kg  Protein: 172.8-216 g protein based on 2.0-2.5g protein/kg  *Fluid needs per team    Subjective:   78M w/ Crohn's, AFib/Flutter s/p DCCVs on amiodarone, remote ileocectomy and open appy here for SBO vs Crohns flare, s/p NGT decompression and now s/p lap TRE converted to open TRE, SBR x 3, left in discontinuity with abthera vac on 6/27, RTOR for ileocolic resection, small bowel anastomosis, and abdominal wall closure on 6/28, and s/p IR aspiration of perihepatic fluid on 7/3, stepped down to telemetry on 7/4. wound dehiscence on 7/5, RTOR ex-lap, washout, ileocolic resection with end ileostomy, blow hole colostomy, red rubber from ileostomy to small bowel anastomosis; Vicryl bridging mesh on 7/5. Transferred to SICU post op for HD monitoring. Extubated 7/6. Surgical wound with decreasing in size and granulating with Vac. Decreasing fistula output.    Pt seen in room, awake, alert, very pleasant. OOB in chair. Breathing on room air. TPN running at goal of 96ml/hr. Has remained NPO but sucking intermittently on ice chips. Denied complaints of N/V. Ileostomy functioning. Noted with decreased EC fistula drain output since starting on imodium, and continues on cholestyramine and octreotide. Denied complaints of pain. Afebrile. Nutritionally pertinent labs: Na 132 (L), , T. Bili 3.1 (H),  (8/8), LFTs slightly elevated. Will continue to follow per RD protocol.     Previous Nutrition Diagnosis:  Increased Nutrient Needs R/T physiological demands for nutrient AEB post-op wound healing    Active [ x ]  Resolved [   ]    Goal: Pt will meet at least 75% of protein & energy needs via most appropriate route for nutrition    Recommendations:  1. Continue with current TPN order via PICC line: 427 gDex, 175 gAA, and 50 gSMOF providing 2651.8 kcals, 175g protein, with GIR 2.9 mg/kg/min.  2. Monitor lytes and replete prn. POC BG q6hrs. Weekly TG checks (due 8/16, d/w team). Weekly weights.   3. Pain regimen per team   4. Continue octreotide, cholestyramine, imodium, as needed   5. Advance to [facility] as medically feasible/tolerated   6. RD to remain available for further nutrition recommendations     Risk Level: High [ x ] Moderate [   ] Low [   ].

## 2023-08-16 NOTE — PROGRESS NOTE ADULT - SUBJECTIVE AND OBJECTIVE BOX
SUBJECTIVE:   Patient seen and examined at bedside this AM   No acute overnight events noted   Asymptomatic and without complaints or concerns   Voiding spontaneously and adequately   Enteric content per ileostomy       MEDICATIONS  (STANDING):  aMIOdarone    Tablet 200 milliGRAM(s) Oral daily  amLODIPine   Tablet 10 milliGRAM(s) Oral daily  atorvastatin 20 milliGRAM(s) Oral at bedtime  chlorhexidine 2% Cloths 1 Application(s) Topical <User Schedule>  cholestyramine Powder (Sugar-Free) 4 Gram(s) Oral two times a day  glucagon  Injectable 1 milliGRAM(s) IntraMuscular once  heparin   Injectable 5000 Unit(s) SubCutaneous every 8 hours  levothyroxine 50 MICROGram(s) Oral daily  lipid, fat emulsion (Fish Oil and Plant Based) 20% Infusion 0.495 Gm/kG/Day (20.83 mL/Hr) IV Continuous <Continuous>  loperamide 4 milliGRAM(s) Oral every 12 hours  losartan 25 milliGRAM(s) Oral daily  metoprolol tartrate 25 milliGRAM(s) Oral every 12 hours  nystatin Powder 1 Application(s) Topical three times a day  octreotide  Injectable 100 MICROGram(s) IV Push every 8 hours  pantoprazole  Injectable 40 milliGRAM(s) IV Push daily  Parenteral Nutrition - Adult 1 Each (96 mL/Hr) TPN Continuous <Continuous>  venlafaxine XR. 300 milliGRAM(s) Oral daily    MEDICATIONS  (PRN):  benzocaine/menthol Lozenge 2 Lozenge Oral every 4 hours PRN Sore Throat  melatonin 5 milliGRAM(s) Oral at bedtime PRN Sleep      Vital Signs Last 24 Hrs  T(C): 36.3 (16 Aug 2023 05:06), Max: 36.9 (15 Aug 2023 22:26)  T(F): 97.3 (16 Aug 2023 05:06), Max: 98.4 (15 Aug 2023 22:26)  HR: 76 (16 Aug 2023 05:05) (76 - 82)  BP: 116/56 (16 Aug 2023 05:05) (112/55 - 136/63)  BP(mean): 80 (16 Aug 2023 05:05) (77 - 94)  RR: 16 (16 Aug 2023 05:05) (16 - 18)  SpO2: 94% (16 Aug 2023 05:05) (94% - 100%)    Parameters below as of 15 Aug 2023 21:05  Patient On (Oxygen Delivery Method): room air        Physical Exam:  General: NAD, resting comfortably in bed   Pulmonary: Nonlabored breathing, no respiratory distress in room air  Cardiovascular: NSR  Abdominal: soft, vac with visible suction, red rubber drain to suction in fistula with surrounding wound manager to gravity, JOYCE drain left abdomen, ileostomy pink, patent and productive of loose enteric content   Extremities: WWP, normal strength  Neuro: A/O x 3, no focal deficits, normal motor/sensation       I&O's Summary    14 Aug 2023 07:01  -  15 Aug 2023 07:00  --------------------------------------------------------  IN: 2595.2 mL / OUT: 3472 mL / NET: -876.8 mL    15 Aug 2023 07:01  -  16 Aug 2023 06:09  --------------------------------------------------------  IN: 2582.4 mL / OUT: 2745 mL / NET: -162.6 mL        LABS:    08-15    137  |  102  |  27<H>  ----------------------------<  98  3.9   |  27  |  0.79    Ca    8.4      15 Aug 2023 05:30  Phos  4.3     08-15  Mg     2.0     08-15    TPro  7.5  /  Alb  2.1<L>  /  TBili  3.4<H>  /  DBili  x   /  AST  108<H>  /  ALT  99<H>  /  AlkPhos  148<H>  08-15      Urinalysis Basic - ( 15 Aug 2023 05:30 )    Color: x / Appearance: x / SG: x / pH: x  Gluc: 98 mg/dL / Ketone: x  / Bili: x / Urobili: x   Blood: x / Protein: x / Nitrite: x   Leuk Esterase: x / RBC: x / WBC x   Sq Epi: x / Non Sq Epi: x / Bacteria: x      CAPILLARY BLOOD GLUCOSE        LIVER FUNCTIONS - ( 15 Aug 2023 05:30 )  Alb: 2.1 g/dL / Pro: 7.5 g/dL / ALK PHOS: 148 U/L / ALT: 99 U/L / AST: 108 U/L / GGT: x             RADIOLOGY & ADDITIONAL STUDIES:

## 2023-08-17 NOTE — PROGRESS NOTE ADULT - ASSESSMENT
78M w/ Crohn's, AFib/Flutter s/p DCCVs on amiodarone, remote ileocectomy and open appy here for SBO vs Crohns flare, s/p NGT decompression and now s/p lap TRE converted to open TRE, SBR x 3, left in discontinuity with abthera vac on 6/27, RTOR for ileocolic resection, small bowel anastomosis, and abdominal wall closure on 6/28, and s/p IR aspiration of perihepatic fluid on 7/3, stepped down to telemetry on 7/4. wound dehiscence on 7/5, RTOR ex-lap, washout, ileocolic resection with end ileostomy, blow hole colostomy, red rubber from ileostomy to small bowel anastomosis; Vicryl bridging mesh on 7/5. Transferred to SICU post op for HD monitoring. Extubated 7/6. Surgical wound with decreasing in size and granulating with Vac. Tentative plan for skin graft per Plastics; timing TBD pending proper wound shrinkage and granulation     Plan:   - NPO w/Sips & chips; continue TPN   - Wound vac change Monday & Friday; adjust fistula wound manager today  - Monitor fistula drain and wound manager output   - PRN pain and nausea control   - Hx of A-fib/flutter; continue rate and rhythm control   - Encourage IS/OOB   - DVT ppx   - PT   - Tentative plan for skin graft per Plastics; timing TBD pending proper wound granulation

## 2023-08-17 NOTE — PROGRESS NOTE ADULT - SUBJECTIVE AND OBJECTIVE BOX
SUBJECTIVE: SUBJECTIVE: Pt seen and examined at bedside with chief. Pain well controlled. Vac lost suction overnight.        MEDICATIONS  (STANDING):  aMIOdarone    Tablet 200 milliGRAM(s) Oral daily  amLODIPine   Tablet 10 milliGRAM(s) Oral daily  atorvastatin 20 milliGRAM(s) Oral at bedtime  chlorhexidine 2% Cloths 1 Application(s) Topical <User Schedule>  cholestyramine Powder (Sugar-Free) 4 Gram(s) Oral two times a day  glucagon  Injectable 1 milliGRAM(s) IntraMuscular once  heparin   Injectable 5000 Unit(s) SubCutaneous every 8 hours  levothyroxine 50 MICROGram(s) Oral daily  lipid, fat emulsion (Fish Oil and Plant Based) 20% Infusion 0.4951 Gm/kG/Day (20.8 mL/Hr) IV Continuous <Continuous>  loperamide 4 milliGRAM(s) Oral every 12 hours  losartan 25 milliGRAM(s) Oral daily  metoprolol tartrate 25 milliGRAM(s) Oral every 12 hours  nystatin Powder 1 Application(s) Topical three times a day  octreotide  Injectable 100 MICROGram(s) IV Push every 8 hours  pantoprazole  Injectable 40 milliGRAM(s) IV Push daily  Parenteral Nutrition - Adult 1 Each (96 mL/Hr) TPN Continuous <Continuous>  Parenteral Nutrition - Adult 1 Each (96 mL/Hr) TPN Continuous <Continuous>  venlafaxine XR. 300 milliGRAM(s) Oral daily    MEDICATIONS  (PRN):  benzocaine/menthol Lozenge 2 Lozenge Oral every 4 hours PRN Sore Throat  melatonin 5 milliGRAM(s) Oral at bedtime PRN Sleep      Vital Signs Last 24 Hrs  T(C): 36.3 (17 Aug 2023 17:38), Max: 36.5 (16 Aug 2023 18:02)  T(F): 97.4 (17 Aug 2023 17:38), Max: 97.7 (16 Aug 2023 18:02)  HR: 78 (17 Aug 2023 15:23) (76 - 80)  BP: 117/69 (17 Aug 2023 15:23) (116/59 - 158/60)  BP(mean): 88 (17 Aug 2023 15:23) (81 - 104)  RR: 16 (17 Aug 2023 15:23) (15 - 17)  SpO2: 98% (17 Aug 2023 15:23) (93% - 98%)    Parameters below as of 17 Aug 2023 15:23  Patient On (Oxygen Delivery Method): room air        PHYSICAL EXAM:      Constitutional: A&Ox3    Respiratory: non labored breathing, no respiratory distress    Cardiovascular: NSR, RRR    Gastrointestinal:  soft, vac with visible suction but machine with leak., red rubber drain to suction in fistula with surrounding wound manager to gravity, JOYCE drain left abdomen, ileostomy pink, patent and productive of loose enteric content       Genitourinary:    Extremities: (-) edema                  I&O's Detail    16 Aug 2023 07:01  -  17 Aug 2023 07:00  --------------------------------------------------------  IN:    Fat Emulsion (Fish Oil &amp; Plant Based) 20% Infusion: 249.6 mL    Oral Fluid: 120 mL    TPN (Total Parenteral Nutrition): 2208 mL  Total IN: 2577.6 mL    OUT:    Bulb (mL): 0 mL    Drain (mL): 150 mL    Drain (mL): 200 mL    Ileostomy (mL): 170 mL    VAC (Vacuum Assisted Closure) System (mL): 300 mL    Voided (mL): 1650 mL  Total OUT: 2470 mL    Total NET: 107.6 mL      17 Aug 2023 07:01  -  17 Aug 2023 17:46  --------------------------------------------------------  IN:    Fat Emulsion (Fish Oil &amp; Plant Based) 20% Infusion: 20.8 mL    TPN (Total Parenteral Nutrition): 1056 mL  Total IN: 1076.8 mL    OUT:    Bulb (mL): 10 mL    Drain (mL): 0 mL    Drain (mL): 350 mL    Ileostomy (mL): 90 mL    VAC (Vacuum Assisted Closure) System (mL): 75 mL    Voided (mL): 1000 mL  Total OUT: 1525 mL    Total NET: -448.2 mL          LABS:    08-17    137  |  102  |  27<H>  ----------------------------<  111<H>  3.6   |  27  |  0.74    Ca    8.2<L>      17 Aug 2023 05:30  Phos  3.9     08-17  Mg     2.0     08-17    TPro  7.6  /  Alb  2.1<L>  /  TBili  3.2<H>  /  DBili  x   /  AST  102<H>  /  ALT  102<H>  /  AlkPhos  164<H>  08-17      Urinalysis Basic - ( 17 Aug 2023 05:30 )    Color: x / Appearance: x / SG: x / pH: x  Gluc: 111 mg/dL / Ketone: x  / Bili: x / Urobili: x   Blood: x / Protein: x / Nitrite: x   Leuk Esterase: x / RBC: x / WBC x   Sq Epi: x / Non Sq Epi: x / Bacteria: x        RADIOLOGY & ADDITIONAL STUDIES:

## 2023-08-17 NOTE — PROGRESS NOTE ADULT - SUBJECTIVE AND OBJECTIVE BOX
SUBJECTIVE:   Patient seen and examined this AM   No overnight issues   Wound vac effluent quality now serosanguinous, however wound manager around fistula had no output due to bag configuration from appliance change yesterday   Patient has no complaints or concerns   Voiding spontaneously       MEDICATIONS  (STANDING):  aMIOdarone    Tablet 200 milliGRAM(s) Oral daily  amLODIPine   Tablet 10 milliGRAM(s) Oral daily  atorvastatin 20 milliGRAM(s) Oral at bedtime  chlorhexidine 2% Cloths 1 Application(s) Topical <User Schedule>  cholestyramine Powder (Sugar-Free) 4 Gram(s) Oral two times a day  glucagon  Injectable 1 milliGRAM(s) IntraMuscular once  heparin   Injectable 5000 Unit(s) SubCutaneous every 8 hours  levothyroxine 50 MICROGram(s) Oral daily  lipid, fat emulsion (Fish Oil and Plant Based) 20% Infusion 0.495 Gm/kG/Day (20.83 mL/Hr) IV Continuous <Continuous>  loperamide 4 milliGRAM(s) Oral every 12 hours  losartan 25 milliGRAM(s) Oral daily  metoprolol tartrate 25 milliGRAM(s) Oral every 12 hours  nystatin Powder 1 Application(s) Topical three times a day  octreotide  Injectable 100 MICROGram(s) IV Push every 8 hours  pantoprazole  Injectable 40 milliGRAM(s) IV Push daily  Parenteral Nutrition - Adult 1 Each (96 mL/Hr) TPN Continuous <Continuous>  venlafaxine XR. 300 milliGRAM(s) Oral daily    MEDICATIONS  (PRN):  benzocaine/menthol Lozenge 2 Lozenge Oral every 4 hours PRN Sore Throat  melatonin 5 milliGRAM(s) Oral at bedtime PRN Sleep      Vital Signs Last 24 Hrs  T(C): 36.4 (17 Aug 2023 05:08), Max: 36.5 (16 Aug 2023 09:28)  T(F): 97.5 (17 Aug 2023 05:08), Max: 97.7 (16 Aug 2023 09:28)  HR: 78 (17 Aug 2023 04:15) (76 - 80)  BP: 116/59 (17 Aug 2023 04:15) (109/59 - 152/71)  BP(mean): 81 (17 Aug 2023 04:15) (78 - 104)  RR: 15 (17 Aug 2023 04:15) (15 - 17)  SpO2: 96% (17 Aug 2023 04:15) (94% - 98%)    Parameters below as of 17 Aug 2023 00:21  Patient On (Oxygen Delivery Method): room air        Physical Exam:  General: NAD, resting comfortably in bed   Pulmonary: Nonlabored breathing, no respiratory distress in room air; 1000 mL on IS  Cardiovascular: NSR  Abdominal: soft, vac with visible suction, red rubber drain to suction in fistula with surrounding wound manager to gravity, JOYCE drain left abdomen, ileostomy pink, patent and productive of loose enteric content   Extremities: WWP, normal strength  Neuro: A/O x 3, no focal deficits, normal motor/sensation     I&O's Summary    15 Aug 2023 07:01  -  16 Aug 2023 07:00  --------------------------------------------------------  IN: 2874.4 mL / OUT: 3195 mL / NET: -320.6 mL    16 Aug 2023 07:01  -  17 Aug 2023 06:24  --------------------------------------------------------  IN: 2460.8 mL / OUT: 2470 mL / NET: -9.2 mL        LABS:    08-16    132<L>  |  100  |  28<H>  ----------------------------<  130<H>  3.4<L>   |  25  |  0.71    Ca    7.8<L>      16 Aug 2023 08:35  Phos  4.0     08-16  Mg     1.9     08-16    TPro  7.5  /  Alb  1.9<L>  /  TBili  3.1<H>  /  DBili  x   /  AST  90<H>  /  ALT  95<H>  /  AlkPhos  159<H>  08-16      Urinalysis Basic - ( 16 Aug 2023 08:35 )    Color: x / Appearance: x / SG: x / pH: x  Gluc: 130 mg/dL / Ketone: x  / Bili: x / Urobili: x   Blood: x / Protein: x / Nitrite: x   Leuk Esterase: x / RBC: x / WBC x   Sq Epi: x / Non Sq Epi: x / Bacteria: x      CAPILLARY BLOOD GLUCOSE        LIVER FUNCTIONS - ( 16 Aug 2023 08:35 )  Alb: 1.9 g/dL / Pro: 7.5 g/dL / ALK PHOS: 159 U/L / ALT: 95 U/L / AST: 90 U/L / GGT: x             RADIOLOGY & ADDITIONAL STUDIES:

## 2023-08-18 NOTE — PROGRESS NOTE ADULT - SUBJECTIVE AND OBJECTIVE BOX
SUBJECTIVE:   Patient seen and examined at bedside this AM   No acute overnight events noted   No complaints or concerns   Continues to make adequate spontaneously and have bowel function per ostomy       MEDICATIONS  (STANDING):  aMIOdarone    Tablet 200 milliGRAM(s) Oral daily  amLODIPine   Tablet 10 milliGRAM(s) Oral daily  atorvastatin 20 milliGRAM(s) Oral at bedtime  chlorhexidine 2% Cloths 1 Application(s) Topical <User Schedule>  cholestyramine Powder (Sugar-Free) 4 Gram(s) Oral two times a day  glucagon  Injectable 1 milliGRAM(s) IntraMuscular once  heparin   Injectable 5000 Unit(s) SubCutaneous every 8 hours  levothyroxine 50 MICROGram(s) Oral daily  lipid, fat emulsion (Fish Oil and Plant Based) 20% Infusion 0.4951 Gm/kG/Day (20.8 mL/Hr) IV Continuous <Continuous>  loperamide 4 milliGRAM(s) Oral every 12 hours  losartan 25 milliGRAM(s) Oral daily  metoprolol tartrate 25 milliGRAM(s) Oral every 12 hours  nystatin Powder 1 Application(s) Topical three times a day  octreotide  Injectable 100 MICROGram(s) IV Push every 8 hours  pantoprazole  Injectable 40 milliGRAM(s) IV Push daily  Parenteral Nutrition - Adult 1 Each (96 mL/Hr) TPN Continuous <Continuous>  venlafaxine XR. 300 milliGRAM(s) Oral daily    MEDICATIONS  (PRN):  benzocaine/menthol Lozenge 2 Lozenge Oral every 4 hours PRN Sore Throat  melatonin 5 milliGRAM(s) Oral at bedtime PRN Sleep      Vital Signs Last 24 Hrs  T(C): 36.4 (17 Aug 2023 21:50), Max: 36.4 (17 Aug 2023 21:50)  T(F): 97.6 (17 Aug 2023 21:50), Max: 97.6 (17 Aug 2023 21:50)  HR: 76 (18 Aug 2023 04:12) (76 - 80)  BP: 132/66 (18 Aug 2023 04:12) (117/69 - 158/60)  BP(mean): 92 (18 Aug 2023 04:12) (84 - 95)  RR: 18 (18 Aug 2023 04:12) (16 - 18)  SpO2: 97% (18 Aug 2023 04:12) (93% - 98%)    Parameters below as of 18 Aug 2023 04:12  Patient On (Oxygen Delivery Method): room air        Physical Exam:  General: NAD, resting comfortably in bed   Pulmonary: Nonlabored breathing, no respiratory distress in room air  Cardiovascular: NSR  Abdominal: soft, vac with visible suction and serosanguinous output, Poole drain to suction in fistula with surrounding wound manager to gravity, JOYCE drain left abdomen, ileostomy pink, patent and productive of loose enteric content   Extremities: WWP, normal strength  Neuro: A/O x 3, no focal deficits, normal motor/sensation     I&O's Summary    17 Aug 2023 07:01  -  18 Aug 2023 07:00  --------------------------------------------------------  IN: 2340.8 mL / OUT: 2932.5 mL / NET: -591.7 mL        LABS:    08-18    138  |  106  |  24<H>  ----------------------------<  127<H>  3.8   |  26  |  0.69    Ca    8.2<L>      18 Aug 2023 05:30  Phos  3.5     08-18  Mg     2.0     08-18    TPro  7.9  /  Alb  2.1<L>  /  TBili  3.1<H>  /  DBili  x   /  AST  103<H>  /  ALT  104<H>  /  AlkPhos  179<H>  08-18      Urinalysis Basic - ( 18 Aug 2023 05:30 )    Color: x / Appearance: x / SG: x / pH: x  Gluc: 127 mg/dL / Ketone: x  / Bili: x / Urobili: x   Blood: x / Protein: x / Nitrite: x   Leuk Esterase: x / RBC: x / WBC x   Sq Epi: x / Non Sq Epi: x / Bacteria: x      CAPILLARY BLOOD GLUCOSE        LIVER FUNCTIONS - ( 18 Aug 2023 05:30 )  Alb: 2.1 g/dL / Pro: 7.9 g/dL / ALK PHOS: 179 U/L / ALT: 104 U/L / AST: 103 U/L / GGT: x             RADIOLOGY & ADDITIONAL STUDIES:

## 2023-08-18 NOTE — PROGRESS NOTE ADULT - ASSESSMENT
78M w/ Crohn's, AFib/Flutter s/p DCCVs on amiodarone, remote ileocectomy and open appy here for SBO vs Crohns flare, s/p NGT decompression and now s/p lap TRE converted to open TRE, SBR x 3, left in discontinuity with abthera vac on 6/27, RTOR for ileocolic resection, small bowel anastomosis, and abdominal wall closure on 6/28, and s/p IR aspiration of perihepatic fluid on 7/3, stepped down to telemetry on 7/4. wound dehiscence on 7/5, RTOR ex-lap, washout, ileocolic resection with end ileostomy, blow hole colostomy, red rubber from ileostomy to small bowel anastomosis; Vicryl bridging mesh on 7/5. Transferred to SICU post op for HD monitoring. Extubated 7/6. Surgical wound with decreasing in size and granulating with Vac. Vac changes Monday & Friday. 14 Fr red rubber replaced with 28 Fr Poole and fistula output has been better controlled since     Plan:   - NPO w/Sips & chips; continue TPN   - Next vac change Monday   - Monitor fistula & drain output   - PRN pain control   - Hx of A-fib/flutter; continue rate and rhythm control   - Encourage IS/OOB/ambulation  - DVT ppx   - PT

## 2023-08-18 NOTE — PROGRESS NOTE ADULT - SUBJECTIVE AND OBJECTIVE BOX
Outputs are stable  Electrolytes are normal  AVSS  Continue wound care  Continue TPN  We'll check CBC on Monday

## 2023-08-18 NOTE — PROGRESS NOTE ADULT - SUBJECTIVE AND OBJECTIVE BOX
INTERVAL HPI/OVERNIGHT EVENTS: AMRITA    SUBJECTIVE: Pt seen and examined at bedside with chief. Pain well controlled, no acute complaints.        MEDICATIONS  (STANDING):  aMIOdarone    Tablet 200 milliGRAM(s) Oral daily  amLODIPine   Tablet 10 milliGRAM(s) Oral daily  atorvastatin 20 milliGRAM(s) Oral at bedtime  chlorhexidine 2% Cloths 1 Application(s) Topical <User Schedule>  cholestyramine Powder (Sugar-Free) 4 Gram(s) Oral two times a day  glucagon  Injectable 1 milliGRAM(s) IntraMuscular once  heparin   Injectable 5000 Unit(s) SubCutaneous every 8 hours  levothyroxine 50 MICROGram(s) Oral daily  lipid, fat emulsion (Fish Oil and Plant Based) 20% Infusion 0.4951 Gm/kG/Day (20.8 mL/Hr) IV Continuous <Continuous>  loperamide 4 milliGRAM(s) Oral every 12 hours  losartan 25 milliGRAM(s) Oral daily  metoprolol tartrate 25 milliGRAM(s) Oral every 12 hours  nystatin Powder 1 Application(s) Topical three times a day  octreotide  Injectable 100 MICROGram(s) IV Push every 8 hours  pantoprazole  Injectable 40 milliGRAM(s) IV Push daily  Parenteral Nutrition - Adult 1 Each (96 mL/Hr) TPN Continuous <Continuous>  venlafaxine XR. 300 milliGRAM(s) Oral daily    MEDICATIONS  (PRN):  benzocaine/menthol Lozenge 2 Lozenge Oral every 4 hours PRN Sore Throat  melatonin 5 milliGRAM(s) Oral at bedtime PRN Sleep      Vital Signs Last 24 Hrs  T(C): 36.4 (17 Aug 2023 21:50), Max: 36.4 (17 Aug 2023 21:50)  T(F): 97.6 (17 Aug 2023 21:50), Max: 97.6 (17 Aug 2023 21:50)  HR: 76 (18 Aug 2023 04:12) (76 - 80)  BP: 132/66 (18 Aug 2023 04:12) (117/69 - 158/60)  BP(mean): 92 (18 Aug 2023 04:12) (84 - 95)  RR: 18 (18 Aug 2023 04:12) (16 - 18)  SpO2: 97% (18 Aug 2023 04:12) (93% - 98%)    Parameters below as of 18 Aug 2023 04:12  Patient On (Oxygen Delivery Method): room air        PHYSICAL EXAM:  Constitutional: A&Ox3    Respiratory: non labored breathing, no respiratory distress    Cardiovascular: NSR, RRR    Gastrointestinal:  soft, vac with visible suction but r drain to suction in fistula intact and draining, wound manager to gravity, JOYCE drain left abdomen, ileostomy pink, patent.    Extremities: (-) edema                  I&O's Detail    17 Aug 2023 07:01  -  18 Aug 2023 07:00  --------------------------------------------------------  IN:    Fat Emulsion (Fish Oil &amp; Plant Based) 20% Infusion: 20.8 mL    Fat Emulsion 20%: 208 mL    TPN (Total Parenteral Nutrition): 2112 mL  Total IN: 2340.8 mL    OUT:    Bulb (mL): 12.5 mL    Drain (mL): 10 mL    Drain (mL): 800 mL    Ileostomy (mL): 210 mL    VAC (Vacuum Assisted Closure) System (mL): 175 mL    Voided (mL): 1825 mL  Total OUT: 3032.5 mL    Total NET: -691.7 mL          LABS:    08-18    138  |  106  |  24<H>  ----------------------------<  127<H>  3.8   |  26  |  0.69    Ca    8.2<L>      18 Aug 2023 05:30  Phos  3.5     08-18  Mg     2.0     08-18    TPro  7.9  /  Alb  2.1<L>  /  TBili  3.1<H>  /  DBili  x   /  AST  103<H>  /  ALT  104<H>  /  AlkPhos  179<H>  08-18      Urinalysis Basic - ( 18 Aug 2023 05:30 )    Color: x / Appearance: x / SG: x / pH: x  Gluc: 127 mg/dL / Ketone: x  / Bili: x / Urobili: x   Blood: x / Protein: x / Nitrite: x   Leuk Esterase: x / RBC: x / WBC x   Sq Epi: x / Non Sq Epi: x / Bacteria: x        RADIOLOGY & ADDITIONAL STUDIES:

## 2023-08-19 NOTE — PROGRESS NOTE ADULT - ASSESSMENT
78M w/ Crohn's, AFib/Flutter s/p DCCVs on amiodarone, remote ileocectomy and open appy here for SBO vs Crohns flare, s/p NGT decompression and now s/p lap TRE converted to open TRE, SBR x 3, left in discontinuity with abthera vac on 6/27, RTOR for ileocolic resection, small bowel anastomosis, and abdominal wall closure on 6/28, and s/p IR aspiration of perihepatic fluid on 7/3, stepped down to telemetery on 7/4. wound dehisence on 7/5, RTOR exlap, washout, ileocolic resection with end ileostomy, blow hole colostomy, red rubber from ileostomy to small bowel anastomosis; vicryl bridging mesh on 7/5. Transferred to SICU post op for HD monitoring. Extubated 7/6. SDU 8/2    NPO w/ sips/ TPN  Pain/Nausea control  Hold octreotide  Home; venlafaxine, losartan, rosuvastatin,synthroid  Wound vac, fistula drain (to suction)  amiodarone, metoprolol.   ID: Blood Cx sent 7/23 - NGTD. Empiric coverage with imipenem ((7/23-7/30) -- ID signed off Dapto (7/23--7/24) IR Cx 7/3: C. tertium, Lactobacillis. OR Cx 7/5 - rare yeast - Imipenem (6/30-7/12), Fluconazole (6/30 -7/12) OR Cx 6/28: Vanc-resistant/amp-sens (but allergic) enterococcus s/p CTX (6/26-6/30), Flagyl (6/26-6/30), Dapto (6/30-7/5).  SCD/SQH  Wounds/Drains: midline incision;L JOYCE at small bowel enterotomy repair - to LIWS

## 2023-08-19 NOTE — PROGRESS NOTE ADULT - SUBJECTIVE AND OBJECTIVE BOX
Patient evaluated with chief resident during AM rounds    STATUS POST:  Exploratory laparotomy    Laparoscopic lysis of intestinal adhesions    Exploratory laparotomy    Open lysis of intestinal adhesions    Resection of small bowel    Temporary closure of abdominal cavity    Repair, anastomosis, ileocolic    Small bowel resection with anastomosis    Flap, myocutaneous, abdominal wall    Reconstruction of abdominal wall using mesh    Washout of abdominal cavity    Creation of ileostomy    Creation of colostomy    Laparoscopic lysis of intestinal adhesions    Open lysis of intestinal adhesions    Resection of small bowel    Temporary closure of abdominal cavity    Repair, anastomosis, ileocolic    Small bowel resection with anastomosis    Flap, myocutaneous, abdominal wall    Reconstruction of abdominal wall using mesh    Washout of abdominal cavity        POST OPERATIVE DAY #:     Overnight Events:  SUBJECTIVE:     Denies Chest Pain, Difficulty breathing, Calf Pain, Headache, Dizziness    OBJECTIVE:  Vital Signs Last 24 Hrs  T(C): 36.5 (19 Aug 2023 13:44), Max: 36.6 (18 Aug 2023 22:05)  T(F): 97.7 (19 Aug 2023 13:44), Max: 97.8 (18 Aug 2023 22:05)  HR: 76 (19 Aug 2023 11:45) (76 - 81)  BP: 126/66 (19 Aug 2023 11:45) (122/70 - 139/75)  BP(mean): 91 (19 Aug 2023 11:45) (84 - 101)  RR: 18 (19 Aug 2023 11:45) (16 - 18)  SpO2: 95% (19 Aug 2023 11:45) (93% - 98%)    Parameters below as of 19 Aug 2023 11:45  Patient On (Oxygen Delivery Method): room air        I&O's Summary    18 Aug 2023 07:01  -  19 Aug 2023 07:00  --------------------------------------------------------  IN: 2913.6 mL / OUT: 3415 mL / NET: -501.4 mL    19 Aug 2023 07:01  -  19 Aug 2023 15:12  --------------------------------------------------------  IN: 540 mL / OUT: 890 mL / NET: -350 mL        Physical Exam:  General: NAD, resting comfortably in bed   Pulmonary: Nonlabored breathing, no respiratory distress in room air  Cardiovascular: NSR  Abdominal: soft, vac with visible suction and serosanguinous output, Poole drain to suction in fistula with surrounding wound manager to gravity, JOYCE drain left abdomen, ileostomy pink, patent and productive of loose enteric content   Extremities: WWP, normal strength  Neuro: A/O x 3, no focal deficits, normal motor/sensation     LABS:                        8.9    10.55 )-----------( 309      ( 19 Aug 2023 06:26 )             29.2     08-19    136  |  106  |  25<H>  ----------------------------<  105<H>  4.1   |  23  |  0.73    Ca    8.0<L>      19 Aug 2023 06:26  Phos  3.7     08-19  Mg     1.9     08-19    TPro  7.5  /  Alb  2.0<L>  /  TBili  2.8<H>  /  DBili  x   /  AST  100<H>  /  ALT  98<H>  /  AlkPhos  183<H>  08-19      Urinalysis Basic - ( 19 Aug 2023 06:26 )    Color: x / Appearance: x / SG: x / pH: x  Gluc: 105 mg/dL / Ketone: x  / Bili: x / Urobili: x   Blood: x / Protein: x / Nitrite: x   Leuk Esterase: x / RBC: x / WBC x   Sq Epi: x / Non Sq Epi: x / Bacteria: x

## 2023-08-20 NOTE — PROGRESS NOTE ADULT - SUBJECTIVE AND OBJECTIVE BOX
INTERVAL HPI/OVERNIGHT EVENTS:    STATUS POST:      POST OPERATIVE DAY #:     SUBJECTIVE:      aMIOdarone    Tablet 200 milliGRAM(s) Oral daily  amLODIPine   Tablet 10 milliGRAM(s) Oral daily  heparin   Injectable 5000 Unit(s) SubCutaneous every 8 hours  losartan 25 milliGRAM(s) Oral daily  metoprolol tartrate 25 milliGRAM(s) Oral every 12 hours      Vital Signs Last 24 Hrs  T(C): 36.7 (20 Aug 2023 05:26), Max: 36.7 (20 Aug 2023 05:26)  T(F): 98 (20 Aug 2023 05:26), Max: 98 (20 Aug 2023 05:26)  HR: 76 (20 Aug 2023 05:31) (76 - 80)  BP: 134/65 (20 Aug 2023 05:31) (126/66 - 150/67)  BP(mean): 92 (20 Aug 2023 05:31) (91 - 97)  RR: 18 (20 Aug 2023 05:31) (15 - 18)  SpO2: 98% (20 Aug 2023 05:31) (94% - 99%)    Parameters below as of 20 Aug 2023 05:31  Patient On (Oxygen Delivery Method): room air      I&O's Detail    19 Aug 2023 07:01  -  20 Aug 2023 07:00  --------------------------------------------------------  IN:    Fat Emulsion 20%: 249.6 mL    Oral Fluid: 180 mL    TPN (Total Parenteral Nutrition): 1728 mL  Total IN: 2157.6 mL    OUT:    Bulb (mL): 0 mL    Drain (mL): 145 mL    Drain (mL): 400 mL    Ileostomy (mL): 465 mL    VAC (Vacuum Assisted Closure) System (mL): 190 mL    Voided (mL): 1820 mL  Total OUT: 3020 mL    Total NET: -862.4 mL          General: NAD, resting comfortably in bed  C/V: NSR  Pulm: Nonlabored breathing, no respiratory distress  Abd: soft, NT/ND.  Extrem: WWP, no edema, SCDs in place  Drains:  Poole:      LABS:                        8.9    10.55 )-----------( 309      ( 19 Aug 2023 06:26 )             29.2     08-19    136  |  106  |  25<H>  ----------------------------<  105<H>  4.1   |  23  |  0.73    Ca    8.0<L>      19 Aug 2023 06:26  Phos  3.7     08-19  Mg     1.9     08-19    TPro  7.5  /  Alb  2.0<L>  /  TBili  2.8<H>  /  DBili  x   /  AST  100<H>  /  ALT  98<H>  /  AlkPhos  183<H>  08-19      Urinalysis Basic - ( 19 Aug 2023 06:26 )    Color: x / Appearance: x / SG: x / pH: x  Gluc: 105 mg/dL / Ketone: x  / Bili: x / Urobili: x   Blood: x / Protein: x / Nitrite: x   Leuk Esterase: x / RBC: x / WBC x   Sq Epi: x / Non Sq Epi: x / Bacteria: x        RADIOLOGY & ADDITIONAL STUDIES:   INTERVAL HPI/OVERNIGHT EVENTS: no acute events    STATUS POST:  Exploratory laparotomy, Laparoscopic lysis of intestinal adhesions, Exploratory laparotomy, Open lysis of intestinal adhesions, Resection of small bowel, Temporary closure of abdominal cavity, Repair, anastomosis, ileocolic, Small bowel resection with anastomosis, Flap, myocutaneous, abdominal wall, Reconstruction of abdominal wall using mesh, Washout of abdominal cavity, Creation of ileostomy, Creation of colostomy, Laparoscopic lysis of intestinal adhesions, Open lysis of intestinal adhesions, Resection of small bowel, Temporary closure of abdominal cavity, Repair, anastomosis, ileocolic, Small bowel resection with anastomosis, Flap, myocutaneous, abdominal wall, Reconstruction of abdominal wall using mesh, Washout of abdominal cavity    SUBJECTIVE: Patient seen and examined at bedside with chief resident. Patient states he is feeling well with no acute complaints. He states that his nausea has much improved with the scopolamine patch and discontinuation of octreotide.      aMIOdarone    Tablet 200 milliGRAM(s) Oral daily  amLODIPine   Tablet 10 milliGRAM(s) Oral daily  heparin   Injectable 5000 Unit(s) SubCutaneous every 8 hours  losartan 25 milliGRAM(s) Oral daily  metoprolol tartrate 25 milliGRAM(s) Oral every 12 hours      Vital Signs Last 24 Hrs  T(C): 36.7 (20 Aug 2023 05:26), Max: 36.7 (20 Aug 2023 05:26)  T(F): 98 (20 Aug 2023 05:26), Max: 98 (20 Aug 2023 05:26)  HR: 76 (20 Aug 2023 05:31) (76 - 80)  BP: 134/65 (20 Aug 2023 05:31) (126/66 - 150/67)  BP(mean): 92 (20 Aug 2023 05:31) (91 - 97)  RR: 18 (20 Aug 2023 05:31) (15 - 18)  SpO2: 98% (20 Aug 2023 05:31) (94% - 99%)    Parameters below as of 20 Aug 2023 05:31  Patient On (Oxygen Delivery Method): room air      I&O's Detail    19 Aug 2023 07:01  -  20 Aug 2023 07:00  --------------------------------------------------------  IN:    Fat Emulsion 20%: 249.6 mL    Oral Fluid: 180 mL    TPN (Total Parenteral Nutrition): 1728 mL  Total IN: 2157.6 mL    OUT:    Bulb (mL): 0 mL    Drain (mL): 145 mL    Drain (mL): 400 mL    Ileostomy (mL): 465 mL    VAC (Vacuum Assisted Closure) System (mL): 190 mL    Voided (mL): 1820 mL  Total OUT: 3020 mL    Total NET: -862.4 mL          General: NAD, resting comfortably in bed  C/V: NSR  Pulm: Nonlabored breathing, no respiratory distress  Abd: soft, wound vac in place with visible suction of serosanguineous output, mccauley drain to suction in fistula with surrounding wound manager to gravity, JOYCE drain in left abdomen with serous output, ileostomy pink, patent, and productive of loose enteric content.  Extrem: WWP, no edema, SCDs in place      LABS:                        8.9    10.55 )-----------( 309      ( 19 Aug 2023 06:26 )             29.2     08-19    136  |  106  |  25<H>  ----------------------------<  105<H>  4.1   |  23  |  0.73    Ca    8.0<L>      19 Aug 2023 06:26  Phos  3.7     08-19  Mg     1.9     08-19    TPro  7.5  /  Alb  2.0<L>  /  TBili  2.8<H>  /  DBili  x   /  AST  100<H>  /  ALT  98<H>  /  AlkPhos  183<H>  08-19      Urinalysis Basic - ( 19 Aug 2023 06:26 )    Color: x / Appearance: x / SG: x / pH: x  Gluc: 105 mg/dL / Ketone: x  / Bili: x / Urobili: x   Blood: x / Protein: x / Nitrite: x   Leuk Esterase: x / RBC: x / WBC x   Sq Epi: x / Non Sq Epi: x / Bacteria: x

## 2023-08-20 NOTE — PROGRESS NOTE ADULT - ASSESSMENT
78M w/ Crohn's, AFib/Flutter s/p DCCVs on amiodarone, remote ileocectomy and open appy here for SBO vs Crohns flare, s/p NGT decompression and now s/p lap TRE converted to open TRE, SBR x 3, left in discontinuity with abthera vac on 6/27, RTOR for ileocolic resection, small bowel anastomosis, and abdominal wall closure on 6/28, and s/p IR aspiration of perihepatic fluid on 7/3, stepped down to telemetery on 7/4. wound dehisence on 7/5, RTOR exlap, washout, ileocolic resection with end ileostomy, blow hole colostomy, red rubber from ileostomy to small bowel anastomosis; vicryl bridging mesh on 7/5. Transferred to SICU post op for HD monitoring. Extubated 7/6. SDU 8/2    NPO w/ sips/ TPN  Pain/Nausea control  Hold octreotide  Home; venlafaxine, losartan, rosuvastatin,synthroid  Wound vac, fistula drain (to suction)  amiodarone, metoprolol.   ID: Blood Cx sent 7/23 - NGTD. Empiric coverage with imipenem ((7/23-7/30) -- ID signed off Dapto (7/23--7/24) IR Cx 7/3: C. tertium, Lactobacillis. OR Cx 7/5 - rare yeast - Imipenem (6/30-7/12), Fluconazole (6/30 -7/12) OR Cx 6/28: Vanc-resistant/amp-sens (but allergic) enterococcus s/p CTX (6/26-6/30), Flagyl (6/26-6/30), Dapto (6/30-7/5).  SCD/SQH  Wounds/Drains: midline incision;L JOYCE at small bowel enterotomy repair - to LIWS  Am labs

## 2023-08-21 NOTE — CHART NOTE - NSCHARTNOTEFT_GEN_A_CORE
Admitting Diagnosis:   Patient is a 78y old  Male who presents with a chief complaint of sbo (07 Aug 2023 14:14)      PAST MEDICAL & SURGICAL HISTORY:  Essential hypertension  Hypertension      Atrial fibrillation  s/p cardioversion 2010 and 2014  Pt. reports 4 DCCV  Now on Amiodarone 200mg PO bid and Eliquis 5mg PO bid  Last DCCV 4 yrs ago at [facility]      Crohn's disease  s/p partial resection of ileum      Hyperlipidemia      Hypothyroidism      History of depression  On Venlafaxine ER 150mg PO bid      H/O knee surgery      History of cataract surgery          Current Nutrition Order:  NPO w/ sips & chips  TPN via PICC: 427g Dex, 175g AA, 50g Smoflipids; provides 2651.8 kcals, 175g protein, GIR 2.94 mg/kg/min    PO Intake: Good (%) [   ]  Fair (50-75%) [   ] Poor (<25%) [   ]- N/A    GI Issues: liquid, green ileostomy output 8/20-8/21    Pain: denies pain/discomfort    Skin Integrity: mid abd wound vac, EC fistula, LUQ JOYCE drain. Rudy score 20    Labs:   08-21    135  |  105  |  26<H>  ----------------------------<  98  4.8   |  22  |  0.77    Ca    8.3<L>      21 Aug 2023 05:34  Phos  4.3     08-21  Mg     2.0     08-21      CAPILLARY BLOOD GLUCOSE          Medications:  MEDICATIONS  (STANDING):  aMIOdarone    Tablet 200 milliGRAM(s) Oral daily  amLODIPine   Tablet 10 milliGRAM(s) Oral daily  atorvastatin 20 milliGRAM(s) Oral at bedtime  chlorhexidine 2% Cloths 1 Application(s) Topical <User Schedule>  cholestyramine Powder (Sugar-Free) 4 Gram(s) Oral two times a day  glucagon  Injectable 1 milliGRAM(s) IntraMuscular once  heparin   Injectable 5000 Unit(s) SubCutaneous every 8 hours  levothyroxine 50 MICROGram(s) Oral daily  lipid, fat emulsion (Fish Oil and Plant Based) 20% Infusion 0.495 Gm/kG/Day (20.83 mL/Hr) IV Continuous <Continuous>  loperamide 4 milliGRAM(s) Oral every 12 hours  losartan 25 milliGRAM(s) Oral daily  metoprolol tartrate 25 milliGRAM(s) Oral every 12 hours  nystatin Powder 1 Application(s) Topical three times a day  pantoprazole  Injectable 40 milliGRAM(s) IV Push daily  Parenteral Nutrition - Adult 1 Each (96 mL/Hr) TPN Continuous <Continuous>  Parenteral Nutrition - Adult 1 Each (96 mL/Hr) TPN Continuous <Continuous>  scopolamine 1 mG/72 Hr(s) Patch 1 Patch Transdermal every 72 hours  venlafaxine XR. 300 milliGRAM(s) Oral daily    MEDICATIONS  (PRN):  benzocaine/menthol Lozenge 2 Lozenge Oral every 4 hours PRN Sore Throat  melatonin 5 milliGRAM(s) Oral at bedtime PRN Sleep      Weight: 101kg [9 Aug 2023]  Daily   102.1kg [10 July 2023]  Daily 99.9kg [9 July 2023]    Weight Change: minor wt fluctuations throughout admission. Recommend weekly weights for trending/monitoring adequacy of nutrition provision    Estimated energy needs:   Ideal body weight (86.4kg) used for calculations as pt >100% of IBW, BMI <30 per [facility] Standards of Care. Needs estimated for age and adjusted for current clinical status, increased needs for post-op & open abd wound healing    Calories: 3850-4181 kcals based on 30-35 kcals/kg  Protein: 172.8-216 g protein based on 2.0-2.5g protein/kg  *Fluid needs per team    Subjective:   78M w/ Crohn's, AFib/Flutter s/p DCCVs on amiodarone, remote ileocectomy and open appy here for SBO vs Crohns flare, s/p NGT decompression and now s/p lap TRE converted to open TRE, SBR x 3, left in discontinuity with abthera vac on 6/27, RTOR for ileocolic resection, small bowel anastomosis, and abdominal wall closure on 6/28, and s/p IR aspiration of perihepatic fluid on 7/3, stepped down to telemetry on 7/4. wound dehiscence on 7/5, RTOR ex-lap, washout, ileocolic resection with end ileostomy, blow hole colostomy, red rubber from ileostomy to small bowel anastomosis; Vicryl bridging mesh on 7/5. Transferred to SICU post op for HD monitoring. Extubated 7/6. Surgical wound with decreasing in size and granulating with Vac. Decreasing fistula output.    Chart reviewed. Pt seen at bedside on 8 LA, receiving treatment from team at time of assessment. Afebrile, HR & BP WNL. MAPs trending >65 mmHg. I&Os x 24hrs: 2947.2 [2304ml TPN / 4395 [3075ml uop + 400ml ileostomy + 5ml L JOYCE + 650ml abd wound vac + 65ml drain + 200ml EC fistula] = -1447.8ml net. TPN remains as primary means to nutrition + NPO w/ sips & chips. Current PN provision provides 30.7 kcals/kg & 2.03g protein/kg IBW. +nausea noted 8/18 [now improved], no emesis noted. Fistula output stable per MD. Meds reviewed- remains on cholestyramine. Labs: ongoing slightly elevated LFTs- will continue to monitor. BUN high, Creat WNL. .    Previous Nutrition Diagnosis:    Active [   ]  Resolved [   ]    If resolved, new PES:     Goal:  Pt will meet at least 75% of protein & energy needs via most appropriate route for nutrition     Recommendations:  1. c/w TPN via PICC: 427g Dex, 175g AA, 50g SMOFlipids; provides 2651.8 kcals, 175g protein, GIR 2.94 mg/kg/min  - fluids per team  2. Lytes per team  - adjust for drain/fistula outputs  3. . Weekly lipid panel  - hold lipid infusion if TG >400  4. Daily BMP/Mg/Phos, POC BG Q4-6hrs  5. GI  - octreotide, bowel regimen per team  - consider antiemetic prn  6. Pain regimen per team  7. Advance to clear liquid diet as medically feasible  8. Recommend weekly weights for trending  9. Monitor clinical course & adjust recommendations prn     Education: deferred    Risk Level: High [  X ] Moderate [   ] Low [   ] Admitting Diagnosis:   Patient is a 78y old  Male who presents with a chief complaint of sbo (07 Aug 2023 14:14)      PAST MEDICAL & SURGICAL HISTORY:  Essential hypertension  Hypertension      Atrial fibrillation  s/p cardioversion 2010 and 2014  Pt. reports 4 DCCV  Now on Amiodarone 200mg PO bid and Eliquis 5mg PO bid  Last DCCV 4 yrs ago at [facility]      Crohn's disease  s/p partial resection of ileum      Hyperlipidemia      Hypothyroidism      History of depression  On Venlafaxine ER 150mg PO bid      H/O knee surgery      History of cataract surgery          Current Nutrition Order:  NPO w/ sips & chips  TPN via PICC: 427g Dex, 175g AA, 50g Smoflipids; provides 2651.8 kcals, 175g protein, GIR 2.94 mg/kg/min    PO Intake: Good (%) [   ]  Fair (50-75%) [   ] Poor (<25%) [   ]- N/A    GI Issues: liquid, green ileostomy output 8/20-8/21    Pain: denies pain/discomfort    Skin Integrity: mid abd wound vac, EC fistula, LUQ JOYCE drain. Rudy score 20    Labs:   08-21    135  |  105  |  26<H>  ----------------------------<  98  4.8   |  22  |  0.77    Ca    8.3<L>      21 Aug 2023 05:34  Phos  4.3     08-21  Mg     2.0     08-21      CAPILLARY BLOOD GLUCOSE          Medications:  MEDICATIONS  (STANDING):  aMIOdarone    Tablet 200 milliGRAM(s) Oral daily  amLODIPine   Tablet 10 milliGRAM(s) Oral daily  atorvastatin 20 milliGRAM(s) Oral at bedtime  chlorhexidine 2% Cloths 1 Application(s) Topical <User Schedule>  cholestyramine Powder (Sugar-Free) 4 Gram(s) Oral two times a day  glucagon  Injectable 1 milliGRAM(s) IntraMuscular once  heparin   Injectable 5000 Unit(s) SubCutaneous every 8 hours  levothyroxine 50 MICROGram(s) Oral daily  lipid, fat emulsion (Fish Oil and Plant Based) 20% Infusion 0.495 Gm/kG/Day (20.83 mL/Hr) IV Continuous <Continuous>  loperamide 4 milliGRAM(s) Oral every 12 hours  losartan 25 milliGRAM(s) Oral daily  metoprolol tartrate 25 milliGRAM(s) Oral every 12 hours  nystatin Powder 1 Application(s) Topical three times a day  pantoprazole  Injectable 40 milliGRAM(s) IV Push daily  Parenteral Nutrition - Adult 1 Each (96 mL/Hr) TPN Continuous <Continuous>  Parenteral Nutrition - Adult 1 Each (96 mL/Hr) TPN Continuous <Continuous>  scopolamine 1 mG/72 Hr(s) Patch 1 Patch Transdermal every 72 hours  venlafaxine XR. 300 milliGRAM(s) Oral daily    MEDICATIONS  (PRN):  benzocaine/menthol Lozenge 2 Lozenge Oral every 4 hours PRN Sore Throat  melatonin 5 milliGRAM(s) Oral at bedtime PRN Sleep      Weight: 101kg [9 Aug 2023]  Daily   102.1kg [10 July 2023]  Daily 99.9kg [9 July 2023]    Weight Change: minor wt fluctuations throughout admission. Recommend weekly weights for trending/monitoring adequacy of nutrition provision    Estimated energy needs:   Ideal body weight (86.4kg) used for calculations as pt >100% of IBW, BMI <30 per [facility] Standards of Care. Needs estimated for age and adjusted for current clinical status, increased needs for post-op & open abd wound healing    Calories: 3149-1341 kcals based on 30-35 kcals/kg  Protein: 172.8-216 g protein based on 2.0-2.5g protein/kg  *Fluid needs per team    Subjective:   78M w/ Crohn's, AFib/Flutter s/p DCCVs on amiodarone, remote ileocectomy and open appy here for SBO vs Crohns flare, s/p NGT decompression and now s/p lap TRE converted to open TRE, SBR x 3, left in discontinuity with abthera vac on 6/27, RTOR for ileocolic resection, small bowel anastomosis, and abdominal wall closure on 6/28, and s/p IR aspiration of perihepatic fluid on 7/3, stepped down to telemetry on 7/4. wound dehiscence on 7/5, RTOR ex-lap, washout, ileocolic resection with end ileostomy, blow hole colostomy, red rubber from ileostomy to small bowel anastomosis; Vicryl bridging mesh on 7/5. Transferred to SICU post op for HD monitoring. Extubated 7/6. Surgical wound with decreasing in size and granulating with Vac. Decreasing fistula output.    Chart reviewed. Pt seen at bedside on 8 LA, receiving treatment from team at time of assessment. Afebrile, HR & BP WNL. MAPs trending >65 mmHg. I&Os x 24hrs: 2947.2 [2304ml TPN / 4395 [3075ml uop + 400ml ileostomy + 5ml L JOYCE + 650ml abd wound vac + 65ml drain + 200ml EC fistula] = -1447.8ml net. TPN remains as primary means to nutrition + NPO w/ sips & chips. Current PN provision provides 30.7 kcals/kg & 2.03g protein/kg IBW. +nausea noted 8/18 [now improved], no emesis noted. Fistula output stable per MD. Meds reviewed- remains on cholestyramine. Labs: ongoing slightly elevated LFTs- will continue to monitor. BUN high, Creat WNL. .    Previous Nutrition Diagnosis:  Increased Nutrient Needs R/T physiological demands for nutrient AEB post-op wound healing    Active [ X  ]  Resolved [   ]    If resolved, new PES:     Goal:  Pt will meet at least 75% of protein & energy needs via most appropriate route for nutrition     Recommendations:  1. c/w TPN via PICC: 427g Dex, 175g AA, 50g SMOFlipids; provides 2651.8 kcals, 175g protein, GIR 2.94 mg/kg/min  - fluids per team  2. Lytes per team  - adjust for drain/fistula outputs  3. . Weekly lipid panel  - hold lipid infusion if TG >400  4. Daily BMP/Mg/Phos, POC BG Q4-6hrs  5. GI  - octreotide, bowel regimen per team  - consider antiemetic prn  6. Pain regimen per team  7. Advance to clear liquid diet as medically feasible  8. Recommend weekly weights for trending  9. Monitor clinical course & adjust recommendations prn     Education: deferred    Risk Level: High [  X ] Moderate [   ] Low [   ]

## 2023-08-21 NOTE — PROGRESS NOTE ADULT - SUBJECTIVE AND OBJECTIVE BOX
SUBJECTIVE:   Patient seen and examined at bedside this AM   No major events over the weekend   Resting comfortably in no acute distress   No complaints or concerns this AM   Voiding spontaneously   Stool/enteric output per ostomy       MEDICATIONS  (STANDING):  aMIOdarone    Tablet 200 milliGRAM(s) Oral daily  amLODIPine   Tablet 10 milliGRAM(s) Oral daily  atorvastatin 20 milliGRAM(s) Oral at bedtime  chlorhexidine 2% Cloths 1 Application(s) Topical <User Schedule>  cholestyramine Powder (Sugar-Free) 4 Gram(s) Oral two times a day  glucagon  Injectable 1 milliGRAM(s) IntraMuscular once  heparin   Injectable 5000 Unit(s) SubCutaneous every 8 hours  levothyroxine 50 MICROGram(s) Oral daily  lipid, fat emulsion (Fish Oil and Plant Based) 20% Infusion 0.495 Gm/kG/Day (20.83 mL/Hr) IV Continuous <Continuous>  loperamide 4 milliGRAM(s) Oral every 12 hours  losartan 25 milliGRAM(s) Oral daily  metoprolol tartrate 25 milliGRAM(s) Oral every 12 hours  nystatin Powder 1 Application(s) Topical three times a day  pantoprazole  Injectable 40 milliGRAM(s) IV Push daily  Parenteral Nutrition - Adult 1 Each (96 mL/Hr) TPN Continuous <Continuous>  Parenteral Nutrition - Adult 1 Each (96 mL/Hr) TPN Continuous <Continuous>  scopolamine 1 mG/72 Hr(s) Patch 1 Patch Transdermal every 72 hours  venlafaxine XR. 300 milliGRAM(s) Oral daily    MEDICATIONS  (PRN):  benzocaine/menthol Lozenge 2 Lozenge Oral every 4 hours PRN Sore Throat  melatonin 5 milliGRAM(s) Oral at bedtime PRN Sleep      Vital Signs Last 24 Hrs  T(C): 36.9 (21 Aug 2023 14:00), Max: 36.9 (21 Aug 2023 14:00)  T(F): 98.5 (21 Aug 2023 14:00), Max: 98.5 (21 Aug 2023 14:00)  HR: 76 (21 Aug 2023 12:55) (74 - 78)  BP: 123/82 (21 Aug 2023 12:55) (123/82 - 142/65)  BP(mean): 97 (21 Aug 2023 12:55) (88 - 97)  RR: 19 (21 Aug 2023 12:55) (16 - 20)  SpO2: 95% (21 Aug 2023 12:55) (93% - 98%)    Parameters below as of 21 Aug 2023 12:55  Patient On (Oxygen Delivery Method): room air        Physical Exam:  General: NAD, resting comfortably in bed  C/V: NSR  Pulm: Nonlabored breathing, no respiratory distress  Abd: soft, wound vac in place with visible suction of serobilious output, mccauley drain to suction in fistula with surrounding wound manager to gravity, JOYCE drain in left abdomen with serous output, ileostomy pink, patent, and productive of loose enteric content.  Extrem: WWP, no edema, SCDs in place      I&O's Summary    20 Aug 2023 07:01  -  21 Aug 2023 07:00  --------------------------------------------------------  IN: 2947.2 mL / OUT: 4395 mL / NET: -1447.8 mL    21 Aug 2023 07:01  -  21 Aug 2023 16:23  --------------------------------------------------------  IN: 1122 mL / OUT: 1300 mL / NET: -178 mL        LABS:                        8.7    9.72  )-----------( 283      ( 21 Aug 2023 05:34 )             28.5     08-21    135  |  105  |  26<H>  ----------------------------<  98  4.8   |  22  |  0.77    Ca    8.3<L>      21 Aug 2023 05:34  Phos  4.3     08-21  Mg     2.0     08-21        Urinalysis Basic - ( 21 Aug 2023 05:34 )    Color: x / Appearance: x / SG: x / pH: x  Gluc: 98 mg/dL / Ketone: x  / Bili: x / Urobili: x   Blood: x / Protein: x / Nitrite: x   Leuk Esterase: x / RBC: x / WBC x   Sq Epi: x / Non Sq Epi: x / Bacteria: x      CAPILLARY BLOOD GLUCOSE            RADIOLOGY & ADDITIONAL STUDIES:

## 2023-08-21 NOTE — PROGRESS NOTE ADULT - ASSESSMENT
78M w/ Crohn's, AFib/Flutter s/p DCCVs on amiodarone, remote ileocectomy and open appy here for SBO vs Crohns flare, s/p NGT decompression and now s/p lap TRE converted to open TRE, SBR x 3, left in discontinuity with abthera vac on 6/27, RTOR for ileocolic resection, small bowel anastomosis, and abdominal wall closure on 6/28, and s/p IR aspiration of perihepatic fluid on 7/3, stepped down to telemetery on 7/4. wound dehisence on 7/5, RTOR exlap, washout, ileocolic resection with end ileostomy, blow hole colostomy, red rubber from ileostomy to small bowel anastomosis; vicryl bridging mesh on 7/5. Transferred to SICU post op for HD monitoring. Extubated 7/6. SDU 8/2    NPO w/ sips/ TPN  Pain/Nausea control  Hold octreotide  Home; venlafaxine, losartan, rosuvastatin,synthroid  Wound vac, fistula drain (to suction)  amiodarone, metoprolol.   ID: Blood Cx sent 7/23 - NGTD. Empiric coverage with imipenem ((7/23-7/30) -- ID signed off Dapto (7/23--7/24) IR Cx 7/3: C. tertium, Lactobacillis. OR Cx 7/5 - rare yeast - Imipenem (6/30-7/12), Fluconazole (6/30 -7/12) OR Cx 6/28: Vanc-resistant/amp-sens (but allergic) enterococcus s/p CTX (6/26-6/30), Flagyl (6/26-6/30), Dapto (6/30-7/5).  SCD/SQH  Wounds/Drains: midline incision;L JOYCE at small bowel [facility]otomy [facility]

## 2023-08-21 NOTE — PROGRESS NOTE ADULT - ASSESSMENT
78M w/ Crohn's, AFib/Flutter s/p DCCVs on amiodarone, remote ileocectomy and open appy here for SBO vs Crohns flare, s/p NGT decompression and now s/p lap TRE converted to open TRE, SBR x 3, left in discontinuity with abthera vac on 6/27, RTOR for ileocolic resection, small bowel anastomosis, and abdominal wall closure on 6/28, and s/p IR aspiration of perihepatic fluid on 7/3, stepped down to telemetry on 7/4. wound dehiscence on 7/5, RTOR ex-lap, washout, ileocolic resection with end ileostomy, blow hole colostomy, red rubber from ileostomy to small bowel anastomosis; Vicryl bridging mesh on 7/5. Transferred to SICU post op for HD monitoring. Extubated 7/6. Surgical wound with decreasing in size and granulating with Vac. Tentative plan for skin graft per Plastics; timing TBD pending proper wound shrinkage and granulation     Plan:   - NPO w/Sips & chips; continue TPN   - Wound vac change Monday & Friday; vac change today   - Continue Loperamide; monitor fistula drain and wound manager output   - PRN pain and nausea control   - Hx of A-fib/flutter; continue rate and rhythm control   - Encourage IS/OOB   - DVT ppx   - PT   - Tentative plan for skin graft per Plastics; timing TBD pending proper wound granulation

## 2023-08-21 NOTE — PROGRESS NOTE ADULT - SUBJECTIVE AND OBJECTIVE BOX
INTERVAL HPI/OVERNIGHT EVENTS: AMRITA    SUBJECTIVE: Doing well, no acute complaints      MEDICATIONS  (STANDING):  aMIOdarone    Tablet 200 milliGRAM(s) Oral daily  amLODIPine   Tablet 10 milliGRAM(s) Oral daily  atorvastatin 20 milliGRAM(s) Oral at bedtime  chlorhexidine 2% Cloths 1 Application(s) Topical <User Schedule>  cholestyramine Powder (Sugar-Free) 4 Gram(s) Oral two times a day  glucagon  Injectable 1 milliGRAM(s) IntraMuscular once  heparin   Injectable 5000 Unit(s) SubCutaneous every 8 hours  levothyroxine 50 MICROGram(s) Oral daily  lipid, fat emulsion (Fish Oil and Plant Based) 20% Infusion 0.495 Gm/kG/Day (20.8 mL/Hr) IV Continuous <Continuous>  loperamide 4 milliGRAM(s) Oral every 12 hours  losartan 25 milliGRAM(s) Oral daily  metoprolol tartrate 25 milliGRAM(s) Oral every 12 hours  nystatin Powder 1 Application(s) Topical three times a day  pantoprazole  Injectable 40 milliGRAM(s) IV Push daily  Parenteral Nutrition - Adult 1 Each (96 mL/Hr) TPN Continuous <Continuous>  scopolamine 1 mG/72 Hr(s) Patch 1 Patch Transdermal every 72 hours  venlafaxine XR. 300 milliGRAM(s) Oral daily    MEDICATIONS  (PRN):  benzocaine/menthol Lozenge 2 Lozenge Oral every 4 hours PRN Sore Throat  melatonin 5 milliGRAM(s) Oral at bedtime PRN Sleep      Vital Signs Last 24 Hrs  T(C): 36.4 (20 Aug 2023 22:30), Max: 36.5 (20 Aug 2023 18:30)  T(F): 97.6 (20 Aug 2023 22:30), Max: 97.7 (20 Aug 2023 18:30)  HR: 78 (21 Aug 2023 04:35) (74 - 78)  BP: 129/66 (21 Aug 2023 04:35) (126/61 - 140/67)  BP(mean): 92 (21 Aug 2023 04:35) (87 - 98)  RR: 18 (21 Aug 2023 04:35) (16 - 18)  SpO2: 93% (21 Aug 2023 04:35) (93% - 98%)    Parameters below as of 21 Aug 2023 04:35  Patient On (Oxygen Delivery Method): room air        PHYSICAL EXAM:  General: NAD, resting comfortably in bed  C/V: NSR  Pulm: Nonlabored breathing, no respiratory distress  Abd: soft, wound vac in place with visible suction of serosanguineous output, mccauley drain to suction in fistula with surrounding wound manager to gravity, JOYCE drain in left abdomen with serous output, ileostomy pink, patent, and productive of loose enteric content.  Extrem: WWP, no edema, SCDs in place                  I&O's Detail    20 Aug 2023 07:01  -  21 Aug 2023 07:00  --------------------------------------------------------  IN:    Fat Emulsion (Fish Oil &amp; Plant Based) 20% Infusion: 293.2 mL    Oral Fluid: 350 mL    TPN (Total Parenteral Nutrition): 2304 mL  Total IN: 2947.2 mL    OUT:    Bulb (mL): 5 mL    Drain (mL): 200 mL    Drain (mL): 65 mL    Ileostomy (mL): 400 mL    VAC (Vacuum Assisted Closure) System (mL): 650 mL    Voided (mL): 3075 mL  Total OUT: 4395 mL    Total NET: -1447.8 mL      21 Aug 2023 07:01  -  21 Aug 2023 08:22  --------------------------------------------------------  IN:    TPN (Total Parenteral Nutrition): 96 mL  Total IN: 96 mL    OUT:  Total OUT: 0 mL    Total NET: 96 mL          LABS:                        8.7    9.72  )-----------( 283      ( 21 Aug 2023 05:34 )             28.5     08-21    135  |  105  |  26<H>  ----------------------------<  98  4.8   |  22  |  0.77    Ca    8.3<L>      21 Aug 2023 05:34  Phos  4.3     08-21  Mg     2.0     08-21        Urinalysis Basic - ( 21 Aug 2023 05:34 )    Color: x / Appearance: x / SG: x / pH: x  Gluc: 98 mg/dL / Ketone: x  / Bili: x / Urobili: x   Blood: x / Protein: x / Nitrite: x   Leuk Esterase: x / RBC: x / WBC x   Sq Epi: x / Non Sq Epi: x / Bacteria: x        RADIOLOGY & ADDITIONAL STUDIES:

## 2023-08-22 NOTE — ADVANCED PRACTICE NURSE CONSULT - ASSESSMENT
Stoma retracted, functioning with tan colored effluent. Peristomal skin slightly denuded with fungal rash. WOCN applied Nystatin powder and stoma powder, sealed with Cavilon skin prep, applied ostomy ring around the stoma, then a Jayleen 2 piece flat, cut to fit, lock and roll pouching system. Reinforced VAC drape adjacent to ostomy pouching system.

## 2023-08-22 NOTE — PROGRESS NOTE ADULT - SUBJECTIVE AND OBJECTIVE BOX
SUBJECTIVE:  Patient seen and examined at bedside this AM   No major events over the weekend   Resting comfortably in no acute distress   No complaints or concerns this AM   Voiding spontaneously   Stool/enteric output per ostomy       MEDICATIONS  (STANDING):  aMIOdarone    Tablet 200 milliGRAM(s) Oral daily  amLODIPine   Tablet 10 milliGRAM(s) Oral daily  atorvastatin 20 milliGRAM(s) Oral at bedtime  chlorhexidine 2% Cloths 1 Application(s) Topical <User Schedule>  cholestyramine Powder (Sugar-Free) 4 Gram(s) Oral two times a day  glucagon  Injectable 1 milliGRAM(s) IntraMuscular once  heparin   Injectable 5000 Unit(s) SubCutaneous every 8 hours  levothyroxine 50 MICROGram(s) Oral daily  lipid, fat emulsion (Fish Oil and Plant Based) 20% Infusion 0.495 Gm/kG/Day (20.83 mL/Hr) IV Continuous <Continuous>  loperamide 4 milliGRAM(s) Oral every 12 hours  losartan 25 milliGRAM(s) Oral daily  metoprolol tartrate 25 milliGRAM(s) Oral every 12 hours  nystatin Powder 1 Application(s) Topical three times a day  pantoprazole  Injectable 40 milliGRAM(s) IV Push daily  Parenteral Nutrition - Adult 1 Each (96 mL/Hr) TPN Continuous <Continuous>  scopolamine 1 mG/72 Hr(s) Patch 1 Patch Transdermal every 72 hours  venlafaxine XR. 300 milliGRAM(s) Oral daily    MEDICATIONS  (PRN):  benzocaine/menthol Lozenge 2 Lozenge Oral every 4 hours PRN Sore Throat  melatonin 5 milliGRAM(s) Oral at bedtime PRN Sleep      Vital Signs Last 24 Hrs  T(C): 36.7 (22 Aug 2023 04:46), Max: 36.9 (21 Aug 2023 14:00)  T(F): 98.1 (22 Aug 2023 04:46), Max: 98.5 (21 Aug 2023 14:00)  HR: 78 (22 Aug 2023 03:30) (76 - 78)  BP: 124/58 (22 Aug 2023 03:30) (123/62 - 148/68)  BP(mean): 84 (22 Aug 2023 03:30) (84 - 98)  RR: 17 (22 Aug 2023 03:30) (17 - 20)  SpO2: 94% (22 Aug 2023 03:30) (94% - 98%)    Parameters below as of 22 Aug 2023 03:30  Patient On (Oxygen Delivery Method): room air        Physical Exam:  General: NAD, resting comfortably in bed  C/V: NSR  Pulm: Nonlabored breathing, no respiratory distress  Abd: soft, wound vac in place with visible suction of serobilious output, mccauley drain to suction in fistula with surrounding wound manager to gravity, JOYCE drain in left abdomen with serous output, ileostomy pink, patent, and productive of loose enteric content.  Extrem: WWP, no edema, SCDs in place    I&O's Summary    20 Aug 2023 07:01  -  21 Aug 2023 07:00  --------------------------------------------------------  IN: 2947.2 mL / OUT: 4395 mL / NET: -1447.8 mL    21 Aug 2023 07:01  -  22 Aug 2023 06:55  --------------------------------------------------------  IN: 3645.2 mL / OUT: 3717 mL / NET: -71.8 mL        LABS:                        8.7    9.72  )-----------( 283      ( 21 Aug 2023 05:34 )             28.5     08-21    135  |  105  |  26<H>  ----------------------------<  98  4.8   |  22  |  0.77    Ca    8.3<L>      21 Aug 2023 05:34  Phos  4.3     08-21  Mg     2.0     08-21        Urinalysis Basic - ( 21 Aug 2023 05:34 )    Color: x / Appearance: x / SG: x / pH: x  Gluc: 98 mg/dL / Ketone: x  / Bili: x / Urobili: x   Blood: x / Protein: x / Nitrite: x   Leuk Esterase: x / RBC: x / WBC x   Sq Epi: x / Non Sq Epi: x / Bacteria: x      CAPILLARY BLOOD GLUCOSE            RADIOLOGY & ADDITIONAL STUDIES:   SUBJECTIVE:  Patient seen and examined at bedside this AM   No major events over the weekend   Resting comfortably in no acute distress   No complaints or concerns this AM   Voiding spontaneously   Stool/enteric output per ostomy       MEDICATIONS  (STANDING):  aMIOdarone    Tablet 200 milliGRAM(s) Oral daily  amLODIPine   Tablet 10 milliGRAM(s) Oral daily  atorvastatin 20 milliGRAM(s) Oral at bedtime  chlorhexidine 2% Cloths 1 Application(s) Topical <User Schedule>  cholestyramine Powder (Sugar-Free) 4 Gram(s) Oral two times a day  glucagon  Injectable 1 milliGRAM(s) IntraMuscular once  heparin   Injectable 5000 Unit(s) SubCutaneous every 8 hours  levothyroxine 50 MICROGram(s) Oral daily  lipid, fat emulsion (Fish Oil and Plant Based) 20% Infusion 0.495 Gm/kG/Day (20.83 mL/Hr) IV Continuous <Continuous>  loperamide 4 milliGRAM(s) Oral every 12 hours  losartan 25 milliGRAM(s) Oral daily  metoprolol tartrate 25 milliGRAM(s) Oral every 12 hours  nystatin Powder 1 Application(s) Topical three times a day  pantoprazole  Injectable 40 milliGRAM(s) IV Push daily  Parenteral Nutrition - Adult 1 Each (96 mL/Hr) TPN Continuous <Continuous>  scopolamine 1 mG/72 Hr(s) Patch 1 Patch Transdermal every 72 hours  venlafaxine XR. 300 milliGRAM(s) Oral daily    MEDICATIONS  (PRN):  benzocaine/menthol Lozenge 2 Lozenge Oral every 4 hours PRN Sore Throat  melatonin 5 milliGRAM(s) Oral at bedtime PRN Sleep      Vital Signs Last 24 Hrs  T(C): 36.7 (22 Aug 2023 04:46), Max: 36.9 (21 Aug 2023 14:00)  T(F): 98.1 (22 Aug 2023 04:46), Max: 98.5 (21 Aug 2023 14:00)  HR: 78 (22 Aug 2023 03:30) (76 - 78)  BP: 124/58 (22 Aug 2023 03:30) (123/62 - 148/68)  BP(mean): 84 (22 Aug 2023 03:30) (84 - 98)  RR: 17 (22 Aug 2023 03:30) (17 - 20)  SpO2: 94% (22 Aug 2023 03:30) (94% - 98%)    Parameters below as of 22 Aug 2023 03:30  Patient On (Oxygen Delivery Method): room air        Physical Exam:  General: NAD, resting comfortably in bed  C/V: NSR  Pulm: Nonlabored breathing, no respiratory distress  Abd: soft, wound vac in place with visible suction, Poole drain to suction in fistula with surrounding wound manager to gravity, JOYCE drain in left abdomen with serous output, ileostomy pink, patent, and productive of loose enteric content.  Extrem: WWP, no edema, SCDs in place    I&O's Summary    20 Aug 2023 07:01  -  21 Aug 2023 07:00  --------------------------------------------------------  IN: 2947.2 mL / OUT: 4395 mL / NET: -1447.8 mL    21 Aug 2023 07:01  -  22 Aug 2023 06:55  --------------------------------------------------------  IN: 3645.2 mL / OUT: 3717 mL / NET: -71.8 mL        LABS:                        8.7    9.72  )-----------( 283      ( 21 Aug 2023 05:34 )             28.5     08-21    135  |  105  |  26<H>  ----------------------------<  98  4.8   |  22  |  0.77    Ca    8.3<L>      21 Aug 2023 05:34  Phos  4.3     08-21  Mg     2.0     08-21        Urinalysis Basic - ( 21 Aug 2023 05:34 )    Color: x / Appearance: x / SG: x / pH: x  Gluc: 98 mg/dL / Ketone: x  / Bili: x / Urobili: x   Blood: x / Protein: x / Nitrite: x   Leuk Esterase: x / RBC: x / WBC x   Sq Epi: x / Non Sq Epi: x / Bacteria: x      CAPILLARY BLOOD GLUCOSE            RADIOLOGY & ADDITIONAL STUDIES:

## 2023-08-22 NOTE — PROGRESS NOTE ADULT - ASSESSMENT
78M w/ Crohn's, AFib/Flutter s/p DCCVs on amiodarone, remote ileocectomy and open appy here for SBO vs Crohns flare, s/p NGT decompression and now s/p lap TRE converted to open TRE, SBR x 3, left in discontinuity with abthera vac on 6/27, RTOR for ileocolic resection, small bowel anastomosis, and abdominal wall closure on 6/28, and s/p IR aspiration of perihepatic fluid on 7/3, stepped down to telemetry on 7/4. wound dehiscence on 7/5, RTOR ex-lap, washout, ileocolic resection with end ileostomy, blow hole colostomy, red rubber from ileostomy to small bowel anastomosis; Vicryl bridging mesh on 7/5. Transferred to SICU post op for HD monitoring. Extubated 7/6. Surgical wound with decreasing in size and granulating with Vac. Tentative plan for skin graft per Plastics; timing TBD pending proper wound shrinkage and granulation     Plan:   - NPO w/Sips & chips; continue TPN   - Wound vac change Monday & Friday; vac change today   - Continue Loperamide; monitor fistula drain and wound manager output   - PRN pain and nausea control   - Hx of A-fib/flutter; continue rate and rhythm control   - Encourage IS/OOB   - DVT ppx   - PT   - Tentative plan for skin graft per Plastics; timing TBD pending proper wound granulation 78M w/ Crohn's, AFib/Flutter s/p DCCVs on amiodarone, remote ileocectomy and open appy here for SBO vs Crohns flare, s/p NGT decompression and now s/p lap TRE converted to open TRE, SBR x 3, left in discontinuity with abthera vac on 6/27, RTOR for ileocolic resection, small bowel anastomosis, and abdominal wall closure on 6/28, and s/p IR aspiration of perihepatic fluid on 7/3, stepped down to telemetry on 7/4. wound dehiscence on 7/5, RTOR ex-lap, washout, ileocolic resection with end ileostomy, blow hole colostomy, red rubber from ileostomy to small bowel anastomosis; Vicryl bridging mesh on 7/5. Transferred to SICU post op for HD monitoring. Extubated 7/6. Surgical wound with decreasing in size and granulating with Vac. Tentative plan for skin graft per Plastics; timing TBD pending proper wound shrinkage and granulation     Plan:   - NPO w/Sips & chips; continue TPN   - Wound vac change Monday & Friday  - Continue Loperamide; monitor fistula drain and wound manager output   - PRN pain and nausea control   - Hx of A-fib/flutter; continue rate and rhythm control   - Encourage IS/OOB   - DVT ppx   - PT   - Tentative plan for skin graft per Plastics; timing TBD pending proper wound granulation

## 2023-08-22 NOTE — PROGRESS NOTE ADULT - SUBJECTIVE AND OBJECTIVE BOX
SUBJECTIVE: Pt seen and examined at bedside with chief. Pt denies any complaints. Pain well controlled. Tolerating some ice chips, denies n/v.    MEDICATIONS  (STANDING):  aMIOdarone    Tablet 200 milliGRAM(s) Oral daily  amLODIPine   Tablet 10 milliGRAM(s) Oral daily  atorvastatin 20 milliGRAM(s) Oral at bedtime  chlorhexidine 2% Cloths 1 Application(s) Topical <User Schedule>  cholestyramine Powder (Sugar-Free) 4 Gram(s) Oral two times a day  glucagon  Injectable 1 milliGRAM(s) IntraMuscular once  heparin   Injectable 5000 Unit(s) SubCutaneous every 8 hours  levothyroxine 50 MICROGram(s) Oral daily  lipid, fat emulsion (Fish Oil and Plant Based) 20% Infusion 0.495 Gm/kG/Day (20.83 mL/Hr) IV Continuous <Continuous>  loperamide 4 milliGRAM(s) Oral every 12 hours  losartan 25 milliGRAM(s) Oral daily  metoprolol tartrate 25 milliGRAM(s) Oral every 12 hours  nystatin Powder 1 Application(s) Topical three times a day  pantoprazole  Injectable 40 milliGRAM(s) IV Push daily  Parenteral Nutrition - Adult 1 Each (96 mL/Hr) TPN Continuous <Continuous>  scopolamine 1 mG/72 Hr(s) Patch 1 Patch Transdermal every 72 hours  venlafaxine XR. 300 milliGRAM(s) Oral daily    MEDICATIONS  (PRN):  benzocaine/menthol Lozenge 2 Lozenge Oral every 4 hours PRN Sore Throat  melatonin 5 milliGRAM(s) Oral at bedtime PRN Sleep      Vital Signs Last 24 Hrs  T(C): 36.7 (22 Aug 2023 04:46), Max: 36.9 (21 Aug 2023 14:00)  T(F): 98.1 (22 Aug 2023 04:46), Max: 98.5 (21 Aug 2023 14:00)  HR: 78 (22 Aug 2023 03:30) (76 - 78)  BP: 124/58 (22 Aug 2023 03:30) (123/62 - 148/68)  BP(mean): 84 (22 Aug 2023 03:30) (84 - 98)  RR: 17 (22 Aug 2023 03:30) (17 - 20)  SpO2: 94% (22 Aug 2023 03:30) (94% - 98%)    Parameters below as of 22 Aug 2023 03:30  Patient On (Oxygen Delivery Method): room air        PHYSICAL EXAM:      Constitutional: A&Ox3    Respiratory: non labored breathing, no respiratory distress    Cardiovascular: NSR, RRR    Abd: soft, wound vac in place with visible suction of serosanguineous output, mccauley drain to suction in fistula with surrounding wound manager to gravity, JOYCE drain in left abdomen with serous output, ileostomy pink, patent, and productive of loose enteric content.    Extremities: (-) edema                  I&O's Detail    21 Aug 2023 07:01  -  22 Aug 2023 07:00  --------------------------------------------------------  IN:    Fat Emulsion (Fish Oil &amp; Plant Based) 20% Infusion: 291.2 mL    Oral Fluid: 1050 mL    TPN (Total Parenteral Nutrition): 2304 mL  Total IN: 3645.2 mL    OUT:    Bulb (mL): 7 mL    Drain (mL): 145 mL    Drain (mL): 500 mL    Ileostomy (mL): 190 mL    VAC (Vacuum Assisted Closure) System (mL): 400 mL    Voided (mL): 2475 mL  Total OUT: 3717 mL    Total NET: -71.8 mL          LABS:                        8.7    9.72  )-----------( 283      ( 21 Aug 2023 05:34 )             28.5     08-21    135  |  105  |  26<H>  ----------------------------<  98  4.8   |  22  |  0.77    Ca    8.3<L>      21 Aug 2023 05:34  Phos  4.3     08-21  Mg     2.0     08-21        Urinalysis Basic - ( 21 Aug 2023 05:34 )    Color: x / Appearance: x / SG: x / pH: x  Gluc: 98 mg/dL / Ketone: x  / Bili: x / Urobili: x   Blood: x / Protein: x / Nitrite: x   Leuk Esterase: x / RBC: x / WBC x   Sq Epi: x / Non Sq Epi: x / Bacteria: x        RADIOLOGY & ADDITIONAL STUDIES:

## 2023-08-23 NOTE — PROGRESS NOTE ADULT - SUBJECTIVE AND OBJECTIVE BOX
SUBJECTIVE:   Patient seen and examined at bedside this AM   Patient asymptomatic and without complaints   Wound vac output concerning   Voiding spontaneously   Stool/enteric output per ostomy      MEDICATIONS  (STANDING):  aMIOdarone    Tablet 200 milliGRAM(s) Oral daily  amLODIPine   Tablet 10 milliGRAM(s) Oral daily  atorvastatin 20 milliGRAM(s) Oral at bedtime  chlorhexidine 2% Cloths 1 Application(s) Topical <User Schedule>  cholestyramine Powder (Sugar-Free) 4 Gram(s) Oral two times a day  glucagon  Injectable 1 milliGRAM(s) IntraMuscular once  heparin   Injectable 5000 Unit(s) SubCutaneous every 8 hours  levothyroxine 50 MICROGram(s) Oral daily  lipid, fat emulsion (Fish Oil and Plant Based) 20% Infusion 0.495 Gm/kG/Day (20.83 mL/Hr) IV Continuous <Continuous>  loperamide 4 milliGRAM(s) Oral every 12 hours  losartan 25 milliGRAM(s) Oral daily  metoprolol tartrate 25 milliGRAM(s) Oral every 12 hours  nystatin Powder 1 Application(s) Topical three times a day  pantoprazole  Injectable 40 milliGRAM(s) IV Push daily  Parenteral Nutrition - Adult 1 Each (96 mL/Hr) TPN Continuous <Continuous>  Parenteral Nutrition - Adult 1 Each (96 mL/Hr) TPN Continuous <Continuous>  venlafaxine XR. 300 milliGRAM(s) Oral daily    MEDICATIONS  (PRN):  benzocaine/menthol Lozenge 2 Lozenge Oral every 4 hours PRN Sore Throat  melatonin 5 milliGRAM(s) Oral at bedtime PRN Sleep      Vital Signs Last 24 Hrs  T(C): 36.7 (23 Aug 2023 09:18), Max: 37 (23 Aug 2023 05:08)  T(F): 98 (23 Aug 2023 09:18), Max: 98.6 (23 Aug 2023 05:08)  HR: 82 (23 Aug 2023 09:52) (78 - 84)  BP: 111/56 (23 Aug 2023 09:52) (111/56 - 136/64)  BP(mean): 77 (23 Aug 2023 09:52) (77 - 94)  RR: 18 (23 Aug 2023 09:52) (17 - 18)  SpO2: 96% (23 Aug 2023 09:52) (91% - 96%)    Parameters below as of 23 Aug 2023 09:52  Patient On (Oxygen Delivery Method): room air        Physical Exam:  General: NAD, resting comfortably in bed  C/V: NSR  Pulm: Nonlabored breathing, no respiratory distress  Abd: soft, wound vac in place with visible suction but seroenteric output, Poole drain to suction in fistula with surrounding wound manager to gravity, JOYCE drain in left abdomen with serous output, ileostomy pink, patent, and productive of loose enteric content.  Extrem: WWP, no edema, SCDs in place       I&O's Summary    22 Aug 2023 07:01  -  23 Aug 2023 07:00  --------------------------------------------------------  IN: 2574.4 mL / OUT: 2400 mL / NET: 174.4 mL        LABS:              CAPILLARY BLOOD GLUCOSE            RADIOLOGY & ADDITIONAL STUDIES:

## 2023-08-23 NOTE — CONSULT NOTE ADULT - ASSESSMENT
78M w/ Crohn's, AFib/Flutter s/p DCCVs on amiodarone, remote ileocectomy and open appy here for SBO vs Crohns flare, s/p NGT decompression and now s/p lap TRE converted to open TRE, SBR x 3, left in discontinuity with abthera vac on 6/27, RTOR for ileocolic resection, small bowel anastomosis, and abdominal wall closure on 6/28, and s/p IR aspiration of perihepatic fluid on 7/3, stepped down to telemetery on 7/4. wound dehisence on 7/5, RTOR exlap, washout, ileocolic resection with end ileostomy, blow hole colostomy, red rubber from ileostomy to small bowel anastomosis; vicryl bridging mesh on 7/5. Transferred to SICU post op for HD monitoring. Extubated 7/6. SDU 8/2. Return to SICU 8/23 w concern for sepsis     NEURO: hold off sedating meds. dc scopolamine patch, cont outpatient effexor,   CV: Hx of Afib/flutter: s/p DCCV, on PO amiodarone, lopressor, HTN on norvasc, losartan TTE from (07/18) - PASP 64mmHg, EF 65-70%, hold lopressor/norvasc/losartan for now given concerns for sepsis. restart if BP high.   PULM: no resp distress on room air.   GI/FEN: NPO except meds and sips with TPN for nutritional support in setting of high output EC fistula, will stop TPN/lipids for now due to concerns for line sepsis.   : voids.   ENDO: ISS  ID: concern for sepsis, particularly line sepsis given prolonged PICC from 6/30, check UA, CXR, bld cx sent. remove PICC. previously had C. tertium, Lactobacillis from his IR cx 7/3, and candida albicans, lactobacillus, vanc sensitive E faecium, vanc resistant E gallinarum, vanc resistant E casseliflavus, lactobacillus from his OR cx 7/5. he had completed course of abx with imipenem (6/30-7/12, 7/23-7/30)  Dapto (6/30-7/5 and 7/23-7/24). been off all abx since 7/30. start empiric dapto and cefepime.   PPX: SCDs, SQH  LINES: PIVs,  dc RUE PICC (6/30-8/23) // PREVIOUS: R Chest tube for iatrogenic PTX (6/27-7/3), R TLC (06/26-30). R TLC (7/5 -7/12)  WOUNDS/DRAINS: abdominal wound with vac dsg.        78M w/ Crohn's, AFib/Flutter s/p DCCVs on amiodarone, remote ileocectomy and open appy here for SBO vs Crohns flare, s/p NGT decompression and now s/p lap TRE converted to open TRE, SBR x 3, left in discontinuity with abthera vac on 6/27, RTOR for ileocolic resection, small bowel anastomosis, and abdominal wall closure on 6/28, and s/p IR aspiration of perihepatic fluid on 7/3, stepped down to telemetery on 7/4. wound dehisence on 7/5, RTOR exlap, washout, ileocolic resection with end ileostomy, blow hole colostomy, red rubber from ileostomy to small bowel anastomosis; vicryl bridging mesh on 7/5. Transferred to SICU post op for HD monitoring. Extubated 7/6. SDU 8/2. Return to SICU 8/23 w concern for sepsis     NEURO: hold off sedating meds. dc scopolamine patch, cont outpatient effexor,   CV: Hx of Afib/flutter: s/p DCCV, on PO amiodarone, lopressor, HTN on norvasc, losartan TTE from (07/18) - PASP 64mmHg, EF 65-70%, hold lopressor/norvasc/losartan for now given concerns for sepsis. restart if BP high.   PULM: no resp distress on room air.   GI/FEN: NPO except meds and sips with TPN for nutritional support in setting of high output EC fistula, will stop TPN/lipids for now due to concerns for line sepsis.   : voids.   ENDO: ISS  ID: concern for sepsis, particularly line sepsis given prolonged PICC from 6/30, check UA, CXR, bld cx sent. remove PICC. previously had C. tertium, Lactobacillis from his IR cx 7/3, and candida albicans, lactobacillus, vanc sensitive E faecium, vanc resistant E gallinarum, vanc resistant E casseliflavus, lactobacillus from his OR cx 7/5. he had completed course of abx with imipenem (6/30-7/12, 7/23-7/30)  Dapto (6/30-7/5 and 7/23-7/24). been off all abx since 7/30. angioedema with PCN. start empiric dapto and cefepime.   PPX: SCDs, SQH  LINES: PIVs,  dc RUE PICC (6/30-8/23) // PREVIOUS: R Chest tube for iatrogenic PTX (6/27-7/3), R TLC (06/26-30). R TLC (7/5 -7/12)  WOUNDS/DRAINS: abdominal wound with vac dsg.

## 2023-08-23 NOTE — CONSULT NOTE ADULT - NS ATTEND AMEND GEN_ALL_CORE FT
Crohn's, AF, HTN s/p SBR with ileocolic resection, SB anastomosis, wound dehiscence. Now with toxic vs metabolic encephalopathy  physical as above  some minimal erythema and purulence at PICC site  concern for sepsis vs scopolamine toxicity  DC scopolamine  empiric daptomycin/cefepime though no fever and no increase in WBC  continue amiodarone  hold norvasc, losartan and metoprolol  TPN off for now, follow sugars on D10  decision making of high complexity

## 2023-08-23 NOTE — PROGRESS NOTE ADULT - ASSESSMENT
"Physical Therapy Treatment completed.   Bed Mobility:  Supine to Sit: Moderate Assist  Transfers: Sit to Stand: Moderate Assist (Summer towards end of STS)  Gait: Level Of Assist: Moderate Assist with Front-Wheel Walker       Plan of Care: Will benefit from Physical Therapy 4 times per week  Discharge Recommendations: Equipment: Will Continue to Assess for Equipment Needs.     See \"Rehab Therapy-Acute\" Patient Summary Report for complete documentation.     Pt continues to progress w/ therapy. Pt very motivated to participate. He was able to partially recall his precautions, stating \"I try to not put my whole body weight on my R leg.\" Pt still requires a lot of cues to maintain TTWB especially w/ STS. Pt has a lot of difficulty coordinating w/ only 1 leg and has to pull heavily on the FWW. Once standing pt was able to hold his R LE in front of him for gait training. Pt does fatigue quickly w/ ambulation d/t pain and weakness in his UE's. As per initial eval, pt will benefit from post acute therapy.  " 78M w/ Crohn's, AFib/Flutter s/p DCCVs on amiodarone, remote ileocectomy and open appy here for SBO vs Crohns flare, s/p NGT decompression and now s/p lap TRE converted to open TRE, SBR x 3, left in discontinuity with abthera vac on 6/27, RTOR for ileocolic resection, small bowel anastomosis, and abdominal wall closure on 6/28, and s/p IR aspiration of perihepatic fluid on 7/3, stepped down to telemetry on 7/4. wound dehiscence on 7/5, RTOR ex-lap, washout, ileocolic resection with end ileostomy, blow hole colostomy, red rubber from ileostomy to small bowel anastomosis; Vicryl bridging mesh on 7/5. Transferred to SICU post op for HD monitoring. Extubated 7/6. Surgical wound with decreasing in size and granulating with Vac. Tentative plan for skin graft per Plastics; timing TBD pending proper wound shrinkage and granulation     Plan:   - NPO w/Sips & chips; continue TPN   - Wound vac change Monday & Friday; vac change today due to output quality concern   - Continue Loperamide; monitor fistula drain and wound manager output   - PRN pain and nausea control   - Hx of A-fib/flutter; continue rate and rhythm control   - Encourage IS/OOB   - DVT ppx   - PT   - Tentative plan for skin graft per Plastics; timing TBD pending proper wound granulation     D/w chief resident and attending

## 2023-08-23 NOTE — PROGRESS NOTE ADULT - SUBJECTIVE AND OBJECTIVE BOX
STATUS POST:     6/27: Laparoscopic lysis of adhesions, converted to open lysis of adhesions, SBR x 3, temporary abdominal closure, 5L cryst, 1L 5% alb, 3 pRBC, 1 FFP, 1 plts  6/28: ex lap, removal of abthera, ileocolic resection, small bowel anastamosis, , 1.6 crystalloid, 250 5%, 175 uop   7/3: IR Gendel drained perihepatic aspiration of serous fluid.   7/5: RTOR exlap, washout, ileocolic resection with end ileostomy, blow hole colostomy, fistula, red rubber from ileostomy to small bowel anastomosis; vicryl bridging mesh; R JOYCE below ileostomy, L JOYCE at small bowel enterotomy repair; 500 LR, 500 5% albumin, 3u PRBC, 2 FFP, 400 UOP,     ON: x1 hallucination, but easy to reorient and get back to baseline.     SUBJECTIVE: Patient seen and examined bedside by chief resident, patient c/o of thirst. OOBA. Denies sob/ross/dizziness.      aMIOdarone    Tablet 200 milliGRAM(s) Oral daily  amLODIPine   Tablet 10 milliGRAM(s) Oral daily  heparin   Injectable 5000 Unit(s) SubCutaneous every 8 hours  losartan 25 milliGRAM(s) Oral daily  metoprolol tartrate 25 milliGRAM(s) Oral every 12 hours      Vital Signs Last 24 Hrs  T(C): 37 (23 Aug 2023 05:08), Max: 37 (23 Aug 2023 05:08)  T(F): 98.6 (23 Aug 2023 05:08), Max: 98.6 (23 Aug 2023 05:08)  HR: 84 (23 Aug 2023 06:02) (76 - 84)  BP: 128/59 (23 Aug 2023 06:02) (113/79 - 136/64)  BP(mean): 85 (23 Aug 2023 06:02) (83 - 97)  RR: 18 (23 Aug 2023 06:02) (16 - 18)  SpO2: 93% (23 Aug 2023 04:23) (91% - 96%)    Parameters below as of 23 Aug 2023 06:02  Patient On (Oxygen Delivery Method): room air      I&O's Detail    22 Aug 2023 07:01  -  23 Aug 2023 07:00  --------------------------------------------------------  IN:    Fat Emulsion 20%: 270.4 mL    TPN (Total Parenteral Nutrition): 2208 mL  Total IN: 2478.4 mL    OUT:    Bulb (mL): 15 mL    Drain (mL): 305 mL    Drain (mL): 100 mL    Ileostomy (mL): 155 mL    VAC (Vacuum Assisted Closure) System (mL): 625 mL    Voided (mL): 1050 mL  Total OUT: 2250 mL    Total NET: 228.4 mL          General: NAD, resting comfortably in bed  C/V: NSR  Pulm: Nonlabored breathing, no respiratory distress  Abd: soft, NT/ND. no rebound or guarding  Wound vac; to suction  ostomy; min o/p  Extrem: WWP, no edema, SCDs in place        LABS:                RADIOLOGY & ADDITIONAL STUDIES:

## 2023-08-23 NOTE — CONSULT NOTE ADULT - SUBJECTIVE AND OBJECTIVE BOX
HPI: 78yMale    PMH:     MEDS:  Allergies    penicillin (Angioedema)    Intolerances        ICU Vital Signs Last 24 Hrs  T(F): 98.4 (08-23-23 @ 14:00), Max: 98.6 (08-23-23 @ 05:08)  HR: 90 (08-23-23 @ 12:20) (78 - 90)  BP: 144/64 (08-23-23 @ 12:20) (99/54 - 144/64)  BP(mean): 92 (08-23-23 @ 12:20) (71 - 94)  ABP: --  RR: 17 (08-23-23 @ 11:30) (17 - 18)  SpO2: 99% (08-23-23 @ 12:20) (91% - 99%)    PHYSICAL EXAM:   Neurological: AAOx3, CNII-XII intact,  strength 5/5 b/l  ENT: mucus membrane moist  Cardiovascular: RRR  Respiratory: CTA  Gastrointestinal: soft, NT, ND, BS+  Extremities: warm, no dependent edema  Vascular: no cyanosis/erythema  Skin: no rashes  MSK: no joint swelling.   LABS:    08-23    136  |  103  |  30<H>  ----------------------------<  105<H>  4.2   |  25  |  0.81    Ca    8.3<L>      23 Aug 2023 13:14  Phos  4.4     08-23  Mg     2.0     08-23    TPro  8.2  /  Alb  2.2<L>  /  TBili  3.5<H>  /  DBili  x   /  AST  96<H>  /  ALT  98<H>  /  AlkPhos  174<H>  08-23  LIVER FUNCTIONS - ( 23 Aug 2023 13:14 )  Alb: 2.2 g/dL / Pro: 8.2 g/dL / ALK PHOS: 174 U/L / ALT: 98 U/L / AST: 96 U/L / GGT: x                               8.8    8.31  )-----------( 271      ( 23 Aug 2023 13:14 )             27.6   PT/INR - ( 23 Aug 2023 13:14 )   PT: 15.5 sec;   INR: 1.37          PTT - ( 23 Aug 2023 13:14 )  PTT:35.6 sec  Urinalysis Basic - ( 23 Aug 2023 13:14 )    Color: x / Appearance: x / SG: x / pH: x  Gluc: 105 mg/dL / Ketone: x  / Bili: x / Urobili: x   Blood: x / Protein: x / Nitrite: x   Leuk Esterase: x / RBC: x / WBC x   Sq Epi: x / Non Sq Epi: x / Bacteria: x    CAPILLARY BLOOD GLUCOSE      POCT Blood Glucose.: 111 mg/dL (23 Aug 2023 13:38)    Poole:	  [ ] None	[ ] Daily Poole Order Placed	   Indication:	  [ ] Strict I and O's    [ ] Obstruction     [ ] Incontinence + Stage 3 or 4 Decubitus  Central Line:  [ ] None	   [ ]  Medication / TPN Administration     [ ] No Peripheral IV          HPI: 78yMale w/ Crohn's, AFib/Flutter s/p DCCVs on amiodarone, remote ileocectomy and open appy here for SBO vs Crohns flare, s/p NGT decompression and now s/p lap TRE converted to open TRE, SBR x 3, left in discontinuity with abthera vac on 6/27, RTOR for ileocolic resection, small bowel anastomosis, and abdominal wall closure on 6/28, and s/p IR aspiration of perihepatic fluid on 7/3, stepped down to telemetery on 7/4. wound dehisence on 7/5, RTOR exlap, washout, ileocolic resection with end ileostomy, blow hole colostomy, red rubber from ileostomy to small bowel anastomosis; vicryl bridging mesh on 7/5. Transferred to SICU post op for HD monitoring. Extubated 7/6. SDU 8/2      PMH:     MEDS:  Allergies    penicillin (Angioedema)    Intolerances        ICU Vital Signs Last 24 Hrs  T(F): 98.4 (08-23-23 @ 14:00), Max: 98.6 (08-23-23 @ 05:08)  HR: 90 (08-23-23 @ 12:20) (78 - 90)  BP: 144/64 (08-23-23 @ 12:20) (99/54 - 144/64)  BP(mean): 92 (08-23-23 @ 12:20) (71 - 94)  ABP: --  RR: 17 (08-23-23 @ 11:30) (17 - 18)  SpO2: 99% (08-23-23 @ 12:20) (91% - 99%)    PHYSICAL EXAM:   Neurological: AAOx3, CNII-XII intact,  strength 5/5 b/l  ENT: mucus membrane moist  Cardiovascular: RRR  Respiratory: CTA  Gastrointestinal: soft, NT, ND, BS+  Extremities: warm, no dependent edema  Vascular: no cyanosis/erythema  Skin: no rashes  MSK: no joint swelling.   LABS:    08-23    136  |  103  |  30<H>  ----------------------------<  105<H>  4.2   |  25  |  0.81    Ca    8.3<L>      23 Aug 2023 13:14  Phos  4.4     08-23  Mg     2.0     08-23    TPro  8.2  /  Alb  2.2<L>  /  TBili  3.5<H>  /  DBili  x   /  AST  96<H>  /  ALT  98<H>  /  AlkPhos  174<H>  08-23  LIVER FUNCTIONS - ( 23 Aug 2023 13:14 )  Alb: 2.2 g/dL / Pro: 8.2 g/dL / ALK PHOS: 174 U/L / ALT: 98 U/L / AST: 96 U/L / GGT: x                               8.8    8.31  )-----------( 271      ( 23 Aug 2023 13:14 )             27.6   PT/INR - ( 23 Aug 2023 13:14 )   PT: 15.5 sec;   INR: 1.37          PTT - ( 23 Aug 2023 13:14 )  PTT:35.6 sec  Urinalysis Basic - ( 23 Aug 2023 13:14 )    Color: x / Appearance: x / SG: x / pH: x  Gluc: 105 mg/dL / Ketone: x  / Bili: x / Urobili: x   Blood: x / Protein: x / Nitrite: x   Leuk Esterase: x / RBC: x / WBC x   Sq Epi: x / Non Sq Epi: x / Bacteria: x    CAPILLARY BLOOD GLUCOSE      POCT Blood Glucose.: 111 mg/dL (23 Aug 2023 13:38)    Poole:	  [ ] None	[ ] Daily Poole Order Placed	   Indication:	  [ ] Strict I and O's    [ ] Obstruction     [ ] Incontinence + Stage 3 or 4 Decubitus  Central Line:  [ ] None	   [ ]  Medication / TPN Administration     [ ] No Peripheral IV          HPI: 78yMale w/ Crohn's, AFib/Flutter s/p DCCVs on amiodarone, remote ileocectomy and open appy here for SBO vs Crohns flare, s/p NGT decompression and now s/p lap TRE converted to open TRE, SBR x 3, left in discontinuity with abthera vac on 6/27, RTOR for ileocolic resection, small bowel anastomosis, and abdominal wall closure on 6/28, and s/p IR aspiration of perihepatic fluid on 7/3, stepped down to telemetery on 7/4. wound dehisence on 7/5, RTOR exlap, washout, ileocolic resection with end ileostomy, blow hole colostomy, red rubber from ileostomy to small bowel anastomosis; vicryl bridging mesh on 7/5. prolonged stay in SICU from 7/5 to 8/2 for management of his complicated abdominal wound. 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   pt found to have increase in lethargy and questionable shaking episode today, therefore transferred back to SICU due to concern for sepsis. his PICC was placed on 6/30, and he had been getting TPN/lipids through PICC. he had no fevers, no leukocytosis, SBP was borderline at 's,       PMH:     MEDS:  Allergies    penicillin (Angioedema)    Intolerances        ICU Vital Signs Last 24 Hrs  T(F): 98.4 (08-23-23 @ 14:00), Max: 98.6 (08-23-23 @ 05:08)  HR: 90 (08-23-23 @ 12:20) (78 - 90)  BP: 144/64 (08-23-23 @ 12:20) (99/54 - 144/64)  BP(mean): 92 (08-23-23 @ 12:20) (71 - 94)  ABP: --  RR: 17 (08-23-23 @ 11:30) (17 - 18)  SpO2: 99% (08-23-23 @ 12:20) (91% - 99%)    PHYSICAL EXAM:   Neurological: AAOx3, CNII-XII intact,  strength 5/5 b/l  ENT: mucus membrane moist  Cardiovascular: RRR  Respiratory: CTA  Gastrointestinal: soft, NT, ND, BS+  Extremities: warm, no dependent edema  Vascular: no cyanosis/erythema  Skin: no rashes  MSK: no joint swelling.   LABS:    08-23    136  |  103  |  30<H>  ----------------------------<  105<H>  4.2   |  25  |  0.81    Ca    8.3<L>      23 Aug 2023 13:14  Phos  4.4     08-23  Mg     2.0     08-23    TPro  8.2  /  Alb  2.2<L>  /  TBili  3.5<H>  /  DBili  x   /  AST  96<H>  /  ALT  98<H>  /  AlkPhos  174<H>  08-23  LIVER FUNCTIONS - ( 23 Aug 2023 13:14 )  Alb: 2.2 g/dL / Pro: 8.2 g/dL / ALK PHOS: 174 U/L / ALT: 98 U/L / AST: 96 U/L / GGT: x                               8.8    8.31  )-----------( 271      ( 23 Aug 2023 13:14 )             27.6   PT/INR - ( 23 Aug 2023 13:14 )   PT: 15.5 sec;   INR: 1.37          PTT - ( 23 Aug 2023 13:14 )  PTT:35.6 sec  Urinalysis Basic - ( 23 Aug 2023 13:14 )    Color: x / Appearance: x / SG: x / pH: x  Gluc: 105 mg/dL / Ketone: x  / Bili: x / Urobili: x   Blood: x / Protein: x / Nitrite: x   Leuk Esterase: x / RBC: x / WBC x   Sq Epi: x / Non Sq Epi: x / Bacteria: x    CAPILLARY BLOOD GLUCOSE      POCT Blood Glucose.: 111 mg/dL (23 Aug 2023 13:38)    Poole:	  [ ] None	[ ] Daily Poole Order Placed	   Indication:	  [ ] Strict I and O's    [ ] Obstruction     [ ] Incontinence + Stage 3 or 4 Decubitus  Central Line:  [ ] None	   [ ]  Medication / TPN Administration     [ ] No Peripheral IV          HPI: 78yMale w/ Crohn's, AFib/Flutter s/p DCCVs on amiodarone, remote ileocectomy and open appy here for SBO vs Crohns flare, s/p NGT decompression and now s/p lap TER converted to open TRE, SBR x 3, left in discontinuity with abthera vac on 6/27, RTOR for ileocolic resection, small bowel anastomosis, and abdominal wall closure on 6/28, and s/p IR aspiration of perihepatic fluid on 7/3, stepped down to telemetery on 7/4. wound dehisence on 7/5, RTOR exlap, washout, ileocolic resection with end ileostomy, blow hole colostomy, red rubber from ileostomy to small bowel anastomosis; vicryl bridging mesh on 7/5. prolonged stay in SICU from 7/5 to 8/2 for management of his complicated abdominal wound. 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   pt found to have increase in lethargy and questionable shaking episode today, therefore transferred back to SICU due to concern for sepsis. his PICC was placed on 6/30, and he had been getting TPN/lipids through PICC. he had no fevers, no leukocytosis, SBP was borderline at 99/54, MAP 71, non-tachycardic, on scopolamine patch for nausea, but no sedating meds.       PMH:     MEDS:  Allergies    penicillin (Angioedema)    Intolerances        ICU Vital Signs Last 24 Hrs  T(F): 98.4 (08-23-23 @ 14:00), Max: 98.6 (08-23-23 @ 05:08)  HR: 90 (08-23-23 @ 12:20) (78 - 90)  BP: 144/64 (08-23-23 @ 12:20) (99/54 - 144/64)  BP(mean): 92 (08-23-23 @ 12:20) (71 - 94)  ABP: --  RR: 17 (08-23-23 @ 11:30) (17 - 18)  SpO2: 99% (08-23-23 @ 12:20) (91% - 99%)    PHYSICAL EXAM:   Neurological: AAOx3, CNII-XII intact,  strength 5/5 b/l  ENT: mucus membrane moist  Cardiovascular: RRR  Respiratory: CTA  Gastrointestinal: soft, NT, ND, BS+  Extremities: warm, no dependent edema  Vascular: no cyanosis/erythema  Skin: no rashes  MSK: no joint swelling.   LABS:    08-23    136  |  103  |  30<H>  ----------------------------<  105<H>  4.2   |  25  |  0.81    Ca    8.3<L>      23 Aug 2023 13:14  Phos  4.4     08-23  Mg     2.0     08-23    TPro  8.2  /  Alb  2.2<L>  /  TBili  3.5<H>  /  DBili  x   /  AST  96<H>  /  ALT  98<H>  /  AlkPhos  174<H>  08-23  LIVER FUNCTIONS - ( 23 Aug 2023 13:14 )  Alb: 2.2 g/dL / Pro: 8.2 g/dL / ALK PHOS: 174 U/L / ALT: 98 U/L / AST: 96 U/L / GGT: x                               8.8    8.31  )-----------( 271      ( 23 Aug 2023 13:14 )             27.6   PT/INR - ( 23 Aug 2023 13:14 )   PT: 15.5 sec;   INR: 1.37          PTT - ( 23 Aug 2023 13:14 )  PTT:35.6 sec  Urinalysis Basic - ( 23 Aug 2023 13:14 )    Color: x / Appearance: x / SG: x / pH: x  Gluc: 105 mg/dL / Ketone: x  / Bili: x / Urobili: x   Blood: x / Protein: x / Nitrite: x   Leuk Esterase: x / RBC: x / WBC x   Sq Epi: x / Non Sq Epi: x / Bacteria: x    CAPILLARY BLOOD GLUCOSE      POCT Blood Glucose.: 111 mg/dL (23 Aug 2023 13:38)    Poole:	  [ ] None	[ ] Daily Poole Order Placed	   Indication:	  [ ] Strict I and O's    [ ] Obstruction     [ ] Incontinence + Stage 3 or 4 Decubitus  Central Line:  [ ] None	   [ ]  Medication / TPN Administration     [ ] No Peripheral IV          HPI: 78yMale w/ Crohn's, AFib/Flutter s/p DCCVs on amiodarone, remote ileocectomy and open appy here for SBO vs Crohns flare, s/p NGT decompression and now s/p lap TRE converted to open TRE, SBR x 3, left in discontinuity with abthera vac on 6/27, RTOR for ileocolic resection, small bowel anastomosis, and abdominal wall closure on 6/28, and s/p IR aspiration of perihepatic fluid on 7/3, stepped down to telemetery on 7/4. wound dehisence on 7/5, RTOR exlap, washout, ileocolic resection with end ileostomy, blow hole colostomy, red rubber from ileostomy to small bowel anastomosis; vicryl bridging mesh on 7/5. prolonged stay in SICU from 7/5 to 8/2 for management of his complicated abdominal wound. 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   pt found to have increase in lethargy and questionable shaking episode today, therefore transferred back to SICU due to concern for sepsis. his PICC was placed on 6/30, and he had been getting TPN/lipids through PICC. he had no fevers, no leukocytosis, SBP was borderline at 99/54, MAP 71, non-tachycardic, on scopolamine patch from 6/18 for nausea, but no other sedating meds.   he had previously grown  C. tertium, Lactobacillis from his IR cx 7/3,       course of abx for intraabdominal infx with imipenem (6/30-7/12, 7/23-7/30)  Dapto (7/23--7/24) OR Cx 7/5 - rare yeast - Imipenem (), Fluconazole (6/30 -7/12) OR Cx 6/28: Vanc-resistant/amp-sens (but allergic) enterococcus s/p CTX (6/26-6/30), Flagyl (6/26-6/30), Dapto (6/30-7/5).      PMH:     MEDS:  Allergies    penicillin (Angioedema)    Intolerances        ICU Vital Signs Last 24 Hrs  T(F): 98.4 (08-23-23 @ 14:00), Max: 98.6 (08-23-23 @ 05:08)  HR: 90 (08-23-23 @ 12:20) (78 - 90)  BP: 144/64 (08-23-23 @ 12:20) (99/54 - 144/64)  BP(mean): 92 (08-23-23 @ 12:20) (71 - 94)  ABP: --  RR: 17 (08-23-23 @ 11:30) (17 - 18)  SpO2: 99% (08-23-23 @ 12:20) (91% - 99%)    PHYSICAL EXAM:   Neurological: AAOx3, CNII-XII intact,  strength 5/5 b/l  ENT: mucus membrane moist  Cardiovascular: RRR  Respiratory: CTA  Gastrointestinal: soft, NT, ND, BS+  Extremities: warm, no dependent edema  Vascular: no cyanosis/erythema  Skin: no rashes  MSK: no joint swelling.   LABS:    08-23    136  |  103  |  30<H>  ----------------------------<  105<H>  4.2   |  25  |  0.81    Ca    8.3<L>      23 Aug 2023 13:14  Phos  4.4     08-23  Mg     2.0     08-23    TPro  8.2  /  Alb  2.2<L>  /  TBili  3.5<H>  /  DBili  x   /  AST  96<H>  /  ALT  98<H>  /  AlkPhos  174<H>  08-23  LIVER FUNCTIONS - ( 23 Aug 2023 13:14 )  Alb: 2.2 g/dL / Pro: 8.2 g/dL / ALK PHOS: 174 U/L / ALT: 98 U/L / AST: 96 U/L / GGT: x                               8.8    8.31  )-----------( 271      ( 23 Aug 2023 13:14 )             27.6   PT/INR - ( 23 Aug 2023 13:14 )   PT: 15.5 sec;   INR: 1.37          PTT - ( 23 Aug 2023 13:14 )  PTT:35.6 sec  Urinalysis Basic - ( 23 Aug 2023 13:14 )    Color: x / Appearance: x / SG: x / pH: x  Gluc: 105 mg/dL / Ketone: x  / Bili: x / Urobili: x   Blood: x / Protein: x / Nitrite: x   Leuk Esterase: x / RBC: x / WBC x   Sq Epi: x / Non Sq Epi: x / Bacteria: x    CAPILLARY BLOOD GLUCOSE      POCT Blood Glucose.: 111 mg/dL (23 Aug 2023 13:38)    Poole:	  [ ] None	[ ] Daily Poole Order Placed	   Indication:	  [ ] Strict I and O's    [ ] Obstruction     [ ] Incontinence + Stage 3 or 4 Decubitus  Central Line:  [ ] None	   [ ]  Medication / TPN Administration     [ ] No Peripheral IV          HPI: 78yMale w/ Crohn's, AFib/Flutter s/p DCCVs on amiodarone, remote ileocectomy and open appy here for SBO vs Crohns flare, s/p NGT decompression and now s/p lap TRE converted to open TRE, SBR x 3, left in discontinuity with abthera vac on 6/27, RTOR for ileocolic resection, small bowel anastomosis, and abdominal wall closure on 6/28, and s/p IR aspiration of perihepatic fluid on 7/3, stepped down to telemetery on 7/4. wound dehisence on 7/5, RTOR exlap, washout, ileocolic resection with end ileostomy, blow hole colostomy, red rubber from ileostomy to small bowel anastomosis; vicryl bridging mesh on 7/5. prolonged stay in SICU from 7/5 to 8/2 for management of his complicated abdominal wound. 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   pt found to have increase in lethargy and questionable shaking episode today, his PICC was placed on 6/30, and he had been getting TPN/lipids through PICC. he had no fevers, no leukocytosis, SBP was borderline at 99/54, MAP 71, non-tachycardic, on scopolamine patch from 6/18 for nausea, but no other sedating meds.   he had previously grown  C. tertium, Lactobacillis from his IR cx 7/3, and candida albicans, lactobacillus, vanc sensitive E faecium, vanc resistant E gallinarum, vanc resistant E casseliflavus, lactobacillus from his OR cx 7/5. he had completed course of abx with imipenem (6/30-7/12, 7/23-7/30)  Dapto (6/30-7/5 and 7/23-7/24). pt now being transferred back to SICU due to concern for sepsis, possibly line sepsis.     MEDICATIONS  (STANDING):  aMIOdarone    Tablet 200 milliGRAM(s) Oral daily  atorvastatin 20 milliGRAM(s) Oral at bedtime  cholestyramine Powder (Sugar-Free) 4 Gram(s) Oral two times a day  heparin   Injectable 5000 Unit(s) SubCutaneous every 8 hours  insulin lispro (ADMELOG) corrective regimen sliding scale   SubCutaneous every 6 hours  levothyroxine 50 MICROGram(s) Oral daily  loperamide 4 milliGRAM(s) Oral every 12 hours  nystatin Powder 1 Application(s) Topical three times a day  pantoprazole  Injectable 40 milliGRAM(s) IV Push daily  venlafaxine XR. 300 milliGRAM(s) Oral daily  norvasc 10qd  lopressor 25bid  losartan 25qd    MEDICATIONS  (PRN):  benzocaine/menthol Lozenge 2 Lozenge Oral every 4 hours PRN Sore Throat  dextrose Oral Gel 15 Gram(s) Oral once PRN Blood Glucose LESS THAN 70 milliGRAM(s)/deciliter  melatonin 5 milliGRAM(s) Oral at bedtime PRN Sleep    Allergies    penicillin (Angioedema)    Intolerances        ICU Vital Signs Last 24 Hrs  T(F): 98.4 (08-23-23 @ 14:00), Max: 98.6 (08-23-23 @ 05:08)  HR: 90 (08-23-23 @ 12:20) (78 - 90)  BP: 144/64 (08-23-23 @ 12:20) (99/54 - 144/64)  BP(mean): 92 (08-23-23 @ 12:20) (71 - 94)  ABP: --  RR: 17 (08-23-23 @ 11:30) (17 - 18)  SpO2: 99% (08-23-23 @ 12:20) (91% - 99%)    PHYSICAL EXAM:   Neurological: AAOx3, CNII-XII intact,  strength 5/5 b/l  ENT: mucus membrane moist  Cardiovascular: RRR  Respiratory: CTA  Gastrointestinal: soft, NT, ND, BS+  Extremities: warm, no dependent edema  Vascular: no cyanosis/erythema  Skin: no rashes  MSK: no joint swelling.   LABS:    08-23    136  |  103  |  30<H>  ----------------------------<  105<H>  4.2   |  25  |  0.81    Ca    8.3<L>      23 Aug 2023 13:14  Phos  4.4     08-23  Mg     2.0     08-23    TPro  8.2  /  Alb  2.2<L>  /  TBili  3.5<H>  /  DBili  x   /  AST  96<H>  /  ALT  98<H>  /  AlkPhos  174<H>  08-23  LIVER FUNCTIONS - ( 23 Aug 2023 13:14 )  Alb: 2.2 g/dL / Pro: 8.2 g/dL / ALK PHOS: 174 U/L / ALT: 98 U/L / AST: 96 U/L / GGT: x                               8.8    8.31  )-----------( 271      ( 23 Aug 2023 13:14 )             27.6   PT/INR - ( 23 Aug 2023 13:14 )   PT: 15.5 sec;   INR: 1.37          PTT - ( 23 Aug 2023 13:14 )  PTT:35.6 sec  Urinalysis Basic - ( 23 Aug 2023 13:14 )    Color: x / Appearance: x / SG: x / pH: x  Gluc: 105 mg/dL / Ketone: x  / Bili: x / Urobili: x   Blood: x / Protein: x / Nitrite: x   Leuk Esterase: x / RBC: x / WBC x   Sq Epi: x / Non Sq Epi: x / Bacteria: x    CAPILLARY BLOOD GLUCOSE      POCT Blood Glucose.: 111 mg/dL (23 Aug 2023 13:38)    Poole:	  [x ] None	[ ] Daily Poole Order Placed	   Indication:	  [ ] Strict I and O's    [ ] Obstruction     [ ] Incontinence + Stage 3 or 4 Decubitus  Central Line:  [ ] None	   [x ]  Medication / TPN Administration     [ ] No Peripheral IV          HPI: 78yMale w/ Crohn's, AFib/Flutter s/p DCCVs on amiodarone, remote ileocectomy and open appy here for SBO vs Crohns flare, s/p NGT decompression and now s/p lap TRE converted to open TRE, SBR x 3, left in discontinuity with abthera vac on 6/27, RTOR for ileocolic resection, small bowel anastomosis, and abdominal wall closure on 6/28, and s/p IR aspiration of perihepatic fluid on 7/3, stepped down to telemetery on 7/4. wound dehisence on 7/5, RTOR exlap, washout, ileocolic resection with end ileostomy, blow hole colostomy, red rubber from ileostomy to small bowel anastomosis; vicryl bridging mesh on 7/5. prolonged stay in SICU from 7/5 to 8/2 for management of his complicated abdominal wound. 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   pt found to have increase in lethargy and questionable shaking episode today, his PICC was placed on 6/30, and he had been getting TPN/lipids through PICC. he had no fevers, no leukocytosis, SBP was borderline at 99/54, MAP 71, non-tachycardic, on scopolamine patch from 6/18 for nausea, but no other sedating meds.   he had previously grown  C. tertium, Lactobacillis from his IR cx 7/3, and candida albicans, lactobacillus, vanc sensitive E faecium, vanc resistant E gallinarum, vanc resistant E casseliflavus, lactobacillus from his OR cx 7/5. he had completed course of abx with imipenem (6/30-7/12, 7/23-7/30)  Dapto (6/30-7/5 and 7/23-7/24). pt now being transferred back to SICU due to concern for sepsis, possibly line sepsis.     MEDICATIONS  (STANDING):  aMIOdarone    Tablet 200 milliGRAM(s) Oral daily  atorvastatin 20 milliGRAM(s) Oral at bedtime  cholestyramine Powder (Sugar-Free) 4 Gram(s) Oral two times a day  heparin   Injectable 5000 Unit(s) SubCutaneous every 8 hours  insulin lispro (ADMELOG) corrective regimen sliding scale   SubCutaneous every 6 hours  levothyroxine 50 MICROGram(s) Oral daily  loperamide 4 milliGRAM(s) Oral every 12 hours  nystatin Powder 1 Application(s) Topical three times a day  pantoprazole  Injectable 40 milliGRAM(s) IV Push daily  venlafaxine XR. 300 milliGRAM(s) Oral daily  norvasc 10qd  lopressor 25bid  losartan 25qd    MEDICATIONS  (PRN):  benzocaine/menthol Lozenge 2 Lozenge Oral every 4 hours PRN Sore Throat  dextrose Oral Gel 15 Gram(s) Oral once PRN Blood Glucose LESS THAN 70 milliGRAM(s)/deciliter  melatonin 5 milliGRAM(s) Oral at bedtime PRN Sleep    Allergies    penicillin (Angioedema)    Intolerances        ICU Vital Signs Last 24 Hrs  T(F): 98.4 (08-23-23 @ 14:00), Max: 98.6 (08-23-23 @ 05:08)  HR: 90 (08-23-23 @ 12:20) (78 - 90)  BP: 144/64 (08-23-23 @ 12:20) (99/54 - 144/64)  BP(mean): 92 (08-23-23 @ 12:20) (71 - 94)  ABP: --  RR: 17 (08-23-23 @ 11:30) (17 - 18)  SpO2: 99% (08-23-23 @ 12:20) (91% - 99%)    PHYSICAL EXAM:   Neurological: AAOx3, CNII-XII intact,  strength 5/5 b/l  ENT: mucus membrane moist  Cardiovascular: RRR  Respiratory: CTA  Gastrointestinal: soft, large abdominal wound with fistula, wound bed granulating well, no purulent discharge.   Extremities: warm, no dependent edema  Vascular: no cyanosis/erythema  Skin: no rashes, RUE PICC site with mild erythema,   MSK: no joint swelling.       LABS:    08-23    136  |  103  |  30<H>  ----------------------------<  105<H>  4.2   |  25  |  0.81    Ca    8.3<L>      23 Aug 2023 13:14  Phos  4.4     08-23  Mg     2.0     08-23    TPro  8.2  /  Alb  2.2<L>  /  TBili  3.5<H>  /  DBili  x   /  AST  96<H>  /  ALT  98<H>  /  AlkPhos  174<H>  08-23  LIVER FUNCTIONS - ( 23 Aug 2023 13:14 )  Alb: 2.2 g/dL / Pro: 8.2 g/dL / ALK PHOS: 174 U/L / ALT: 98 U/L / AST: 96 U/L / GGT: x                               8.8    8.31  )-----------( 271      ( 23 Aug 2023 13:14 )             27.6   PT/INR - ( 23 Aug 2023 13:14 )   PT: 15.5 sec;   INR: 1.37          PTT - ( 23 Aug 2023 13:14 )  PTT:35.6 sec  Urinalysis Basic - ( 23 Aug 2023 13:14 )    Color: x / Appearance: x / SG: x / pH: x  Gluc: 105 mg/dL / Ketone: x  / Bili: x / Urobili: x   Blood: x / Protein: x / Nitrite: x   Leuk Esterase: x / RBC: x / WBC x   Sq Epi: x / Non Sq Epi: x / Bacteria: x    CAPILLARY BLOOD GLUCOSE      POCT Blood Glucose.: 111 mg/dL (23 Aug 2023 13:38)    Poole:	  [x ] None	[ ] Daily Poole Order Placed	   Indication:	  [ ] Strict I and O's    [ ] Obstruction     [ ] Incontinence + Stage 3 or 4 Decubitus  Central Line:  [ ] None	   [x ]  Medication / TPN Administration     [ ] No Peripheral IV

## 2023-08-24 NOTE — PROGRESS NOTE ADULT - SUBJECTIVE AND OBJECTIVE BOX
events of yesterday notes  NOW alert  AVSS  Wound progressing  outputs decreased    OOB  PT  Wound care

## 2023-08-24 NOTE — PROGRESS NOTE ADULT - ASSESSMENT
7/3, stepped down to telemetry on 7/4. wound dehiscence on 7/5, RTOR ex-lap, washout, ileocolic resection with end ileostomy, blow hole colostomy, red rubber from ileostomy to small bowel anastomosis; Vicryl bridging mesh on 7/5. Transferred to SICU post op for HD monitoring. Extubated 7/6. Upgraded to SICU 8/23 for acute delirium and tremors, r/o sepsis vs metabolic encephalopathy. Surgical wound with decreasing in size and granulating with Vac. Tentative plan for skin graft per Plastics; timing TBD pending proper wound shrinkage and granulation     Plan:   - F/u ammonia level   - IV Abx   - NPO w/Sips & chips; continue TPN   - Wound vac change Monday & Friday   - Continue Loperamide; monitor fistula drain and wound manager output   - PRN pain and nausea control   - Hx of A-fib/flutter; continue rate and rhythm control   - Encourage IS/OOB   - DVT ppx   - PT   - Tentative plan for skin graft per Plastics; timing TBD pending proper wound granulation   - Appreciate SICU care     D/w chief resident and attending

## 2023-08-24 NOTE — PROGRESS NOTE ADULT - PROVIDER SPECIALTY LIST ADULT
SICU Very hyperactive at school.  They have to keep re-directing her.  Mom wants to know what to do.

## 2023-08-24 NOTE — PROGRESS NOTE ADULT - SUBJECTIVE AND OBJECTIVE BOX
ON: CTH negative for hemorrhage/infarcts. FS persistently in 70s - increased D10 to 70 cc/hr. 1/2 AMP D50 given, repeat . BG 74 again at 0300. STAT BMP sent. Full AMP D50 given- repeat 146. BG on BMP 65 (prior to full amp). Mental status much improved - A&OX3. Shakiness better.       SUBJECTIVE: Patient examined at bedside. Patient's mental status has much improved since yesterday, continues to be A&Ox3. Admits to very mild nausea, denies dizziness,     MEDICATIONS  (STANDING):  aMIOdarone    Tablet 200 milliGRAM(s) Oral daily  atorvastatin 20 milliGRAM(s) Oral at bedtime  cefepime   IVPB 2000 milliGRAM(s) IV Intermittent every 8 hours  chlorhexidine 2% Cloths 1 Application(s) Topical <User Schedule>  chlorhexidine 2% Cloths 1 Application(s) Topical <User Schedule>  cholestyramine Powder (Sugar-Free) 4 Gram(s) Oral two times a day  DAPTOmycin IVPB 800 milliGRAM(s) IV Intermittent every 24 hours  dextrose 10%. 1000 milliLiter(s) (70 mL/Hr) IV Continuous <Continuous>  dextrose 5%. 1000 milliLiter(s) (50 mL/Hr) IV Continuous <Continuous>  dextrose 50% Injectable 25 Gram(s) IV Push once  dextrose 50% Injectable 25 Gram(s) IV Push once  glucagon  Injectable 1 milliGRAM(s) IntraMuscular once  glucagon  Injectable 1 milliGRAM(s) IntraMuscular once  heparin   Injectable 5000 Unit(s) SubCutaneous every 8 hours  insulin lispro (ADMELOG) corrective regimen sliding scale   SubCutaneous every 6 hours  levothyroxine 50 MICROGram(s) Oral daily  loperamide 4 milliGRAM(s) Oral every 12 hours  nystatin Powder 1 Application(s) Topical three times a day  pantoprazole  Injectable 40 milliGRAM(s) IV Push daily  venlafaxine XR. 300 milliGRAM(s) Oral daily    MEDICATIONS  (PRN):  benzocaine/menthol Lozenge 2 Lozenge Oral every 4 hours PRN Sore Throat  dextrose Oral Gel 15 Gram(s) Oral once PRN Blood Glucose LESS THAN 70 milliGRAM(s)/deciliter  melatonin 5 milliGRAM(s) Oral at bedtime PRN Sleep      Drips:     ICU Vital Signs Last 24 Hrs  T(C): 36.9 (24 Aug 2023 05:56), Max: 36.9 (23 Aug 2023 14:00)  T(F): 98.4 (24 Aug 2023 05:56), Max: 98.4 (23 Aug 2023 14:00)  HR: 81 (24 Aug 2023 06:00) (78 - 90)  BP: 123/55 (24 Aug 2023 06:00) (99/54 - 144/64)  BP(mean): 79 (24 Aug 2023 06:00) (71 - 97)  ABP: --  ABP(mean): --  RR: 24 (24 Aug 2023 06:00) (15 - 29)  SpO2: 95% (24 Aug 2023 06:00) (93% - 99%)    O2 Parameters below as of 24 Aug 2023 05:00  Patient On (Oxygen Delivery Method): room air            Physical Exam:  General: NAD  HEENT: NC/AT, EOMI, PERRLA, normal hearing, no oral lesions, neck supple w/o LAD  Pulmonary: Nonlabored breathing, no respiratory distress, CTA-B  Cardiovascular: NSR, no murmurs  Abdominal: soft, NT/ND, +BS, no organomegaly  Extremities: WWP, 5/5 strength x 4, no clubbing/cyanosis/edema  Neuro: A/O x3, CNs II-XII grossly intact, normal motor/sensation, no focal deficits  Pulses: palpable distal pulses      I&O's Summary    23 Aug 2023 07:01  -  24 Aug 2023 07:00  --------------------------------------------------------  IN: 1120 mL / OUT: 2255 mL / NET: -1135 mL        LABS:                        8.9    8.19  )-----------( 144      ( 24 Aug 2023 04:02 )             28.2     08-24    132<L>  |  102  |  24<H>  ----------------------------<  65<L>  4.1   |  23  |  0.90    Ca    8.5      24 Aug 2023 04:02  Phos  3.6     08-24  Mg     2.0     08-24    TPro  8.2  /  Alb  2.2<L>  /  TBili  3.5<H>  /  DBili  x   /  AST  96<H>  /  ALT  98<H>  /  AlkPhos  174<H>  08-23    PT/INR - ( 23 Aug 2023 13:14 )   PT: 15.5 sec;   INR: 1.37          PTT - ( 23 Aug 2023 13:14 )  PTT:35.6 sec  Urinalysis Basic - ( 24 Aug 2023 04:02 )    Color: x / Appearance: x / SG: x / pH: x  Gluc: 65 mg/dL / Ketone: x  / Bili: x / Urobili: x   Blood: x / Protein: x / Nitrite: x   Leuk Esterase: x / RBC: x / WBC x   Sq Epi: x / Non Sq Epi: x / Bacteria: x      CAPILLARY BLOOD GLUCOSE      POCT Blood Glucose.: 146 mg/dL (24 Aug 2023 04:46)  POCT Blood Glucose.: 74 mg/dL (24 Aug 2023 03:23)  POCT Blood Glucose.: 120 mg/dL (24 Aug 2023 00:49)  POCT Blood Glucose.: 74 mg/dL (23 Aug 2023 23:52)  POCT Blood Glucose.: 75 mg/dL (23 Aug 2023 22:12)  POCT Blood Glucose.: 162 mg/dL (23 Aug 2023 16:48)  POCT Blood Glucose.: 70 mg/dL (23 Aug 2023 16:14)  POCT Blood Glucose.: 68 mg/dL (23 Aug 2023 16:13)  POCT Blood Glucose.: 111 mg/dL (23 Aug 2023 13:38)    LIVER FUNCTIONS - ( 23 Aug 2023 13:14 )  Alb: 2.2 g/dL / Pro: 8.2 g/dL / ALK PHOS: 174 U/L / ALT: 98 U/L / AST: 96 U/L / GGT: x             Cultures:Culture Results:   No growth at 12 hours (08-23 @ 13:51)  Culture Results:   No growth at 12 hours (08-23 @ 13:38)  Culture Results:   No growth at 12 hours (08-23 @ 13:38)      RADIOLOGY & ADDITIONAL STUDIES:    PROCEDURE DATE:  08/23/2023          INTERPRETATION:  PROCEDURE: CT head without intravenous contrast    INDICATION: Altered mental status    TECHNIQUE: Multiple axial images were obtained at 5 mm intervals from the   skull base to the vertex. Sagittal and coronal reformatted images were   obtained from the axial data set. The images were reviewed in brain and   bone windows.    COMPARISON: CT head 7/23/2023    FINDINGS: The CT examination demonstrates the ventricles, cisternal   spaces, and cortical sulci to be within normal limits. There is no   midline shift or extra axial collections. The gray white differentiation   appears within normal limits. There is no intracranial hemorrhage or   acute transcortical infarct. The bony windows demonstrates no fractures.   The visualized paranasal sinuses are within normal limits. The mastoid   air cells are well aerated.    IMPRESSION: No intracranial hemorrhage or acute transcortical infarct. ON: CTH negative for hemorrhage/infarcts. FS persistently in 70s - increased D10 to 70 cc/hr. 1/2 AMP D50 given, repeat . BG 74 again at 0300. STAT BMP sent. Full AMP D50 given- repeat 146. BG on BMP 65 (prior to full amp). Mental status much improved - A&OX3. Shakiness better.       SUBJECTIVE: Patient examined at bedside. Patient's mental status has much improved since yesterday, continues to be A&Ox3. Admits to very mild nausea, denies dizziness, fevers, pain.     MEDICATIONS  (STANDING):  aMIOdarone    Tablet 200 milliGRAM(s) Oral daily  atorvastatin 20 milliGRAM(s) Oral at bedtime  cefepime   IVPB 2000 milliGRAM(s) IV Intermittent every 8 hours  chlorhexidine 2% Cloths 1 Application(s) Topical <User Schedule>  chlorhexidine 2% Cloths 1 Application(s) Topical <User Schedule>  cholestyramine Powder (Sugar-Free) 4 Gram(s) Oral two times a day  DAPTOmycin IVPB 800 milliGRAM(s) IV Intermittent every 24 hours  dextrose 10%. 1000 milliLiter(s) (70 mL/Hr) IV Continuous <Continuous>  dextrose 5%. 1000 milliLiter(s) (50 mL/Hr) IV Continuous <Continuous>  dextrose 50% Injectable 25 Gram(s) IV Push once  dextrose 50% Injectable 25 Gram(s) IV Push once  glucagon  Injectable 1 milliGRAM(s) IntraMuscular once  glucagon  Injectable 1 milliGRAM(s) IntraMuscular once  heparin   Injectable 5000 Unit(s) SubCutaneous every 8 hours  insulin lispro (ADMELOG) corrective regimen sliding scale   SubCutaneous every 6 hours  levothyroxine 50 MICROGram(s) Oral daily  loperamide 4 milliGRAM(s) Oral every 12 hours  nystatin Powder 1 Application(s) Topical three times a day  pantoprazole  Injectable 40 milliGRAM(s) IV Push daily  venlafaxine XR. 300 milliGRAM(s) Oral daily    MEDICATIONS  (PRN):  benzocaine/menthol Lozenge 2 Lozenge Oral every 4 hours PRN Sore Throat  dextrose Oral Gel 15 Gram(s) Oral once PRN Blood Glucose LESS THAN 70 milliGRAM(s)/deciliter  melatonin 5 milliGRAM(s) Oral at bedtime PRN Sleep      Drips:     ICU Vital Signs Last 24 Hrs  T(C): 36.9 (24 Aug 2023 05:56), Max: 36.9 (23 Aug 2023 14:00)  T(F): 98.4 (24 Aug 2023 05:56), Max: 98.4 (23 Aug 2023 14:00)  HR: 81 (24 Aug 2023 06:00) (78 - 90)  BP: 123/55 (24 Aug 2023 06:00) (99/54 - 144/64)  BP(mean): 79 (24 Aug 2023 06:00) (71 - 97)  ABP: --  ABP(mean): --  RR: 24 (24 Aug 2023 06:00) (15 - 29)  SpO2: 95% (24 Aug 2023 06:00) (93% - 99%)    O2 Parameters below as of 24 Aug 2023 05:00  Patient On (Oxygen Delivery Method): room air        Physical Exam:  General: NAD  HEENT: NC/AT, EOMI, PERRLA, normal hearing, neck supple w/o LAD  Pulmonary: Nonlabored breathing, no respiratory distress, CTA-B  Cardiovascular: NSR, no murmurs  Abdominal: soft, NT/ND, L JOYCE serous, midline wound vac + fistula w mccauley catheter to wall suction, R ileostomy PPP w brown stool.   Extremities: WWP, no peripheral edema  Neuro: A/O x3, normal motor/sensation, no focal deficits  Pulses: palpable distal pulses      I&O's Summary    23 Aug 2023 07:01  -  24 Aug 2023 07:00  --------------------------------------------------------  IN: 1120 mL / OUT: 2255 mL / NET: -1135 mL        LABS:                        8.9    8.19  )-----------( 144      ( 24 Aug 2023 04:02 )             28.2     08-24    132<L>  |  102  |  24<H>  ----------------------------<  65<L>  4.1   |  23  |  0.90    Ca    8.5      24 Aug 2023 04:02  Phos  3.6     08-24  Mg     2.0     08-24    TPro  8.2  /  Alb  2.2<L>  /  TBili  3.5<H>  /  DBili  x   /  AST  96<H>  /  ALT  98<H>  /  AlkPhos  174<H>  08-23    PT/INR - ( 23 Aug 2023 13:14 )   PT: 15.5 sec;   INR: 1.37          PTT - ( 23 Aug 2023 13:14 )  PTT:35.6 sec  Urinalysis Basic - ( 24 Aug 2023 04:02 )    Color: x / Appearance: x / SG: x / pH: x  Gluc: 65 mg/dL / Ketone: x  / Bili: x / Urobili: x   Blood: x / Protein: x / Nitrite: x   Leuk Esterase: x / RBC: x / WBC x   Sq Epi: x / Non Sq Epi: x / Bacteria: x      CAPILLARY BLOOD GLUCOSE      POCT Blood Glucose.: 146 mg/dL (24 Aug 2023 04:46)  POCT Blood Glucose.: 74 mg/dL (24 Aug 2023 03:23)  POCT Blood Glucose.: 120 mg/dL (24 Aug 2023 00:49)  POCT Blood Glucose.: 74 mg/dL (23 Aug 2023 23:52)  POCT Blood Glucose.: 75 mg/dL (23 Aug 2023 22:12)  POCT Blood Glucose.: 162 mg/dL (23 Aug 2023 16:48)  POCT Blood Glucose.: 70 mg/dL (23 Aug 2023 16:14)  POCT Blood Glucose.: 68 mg/dL (23 Aug 2023 16:13)  POCT Blood Glucose.: 111 mg/dL (23 Aug 2023 13:38)    LIVER FUNCTIONS - ( 23 Aug 2023 13:14 )  Alb: 2.2 g/dL / Pro: 8.2 g/dL / ALK PHOS: 174 U/L / ALT: 98 U/L / AST: 96 U/L / GGT: x             Cultures:Culture Results:   No growth at 12 hours (08-23 @ 13:51)  Culture Results:   No growth at 12 hours (08-23 @ 13:38)  Culture Results:   No growth at 12 hours (08-23 @ 13:38)      RADIOLOGY & ADDITIONAL STUDIES:    PROCEDURE DATE:  08/23/2023          INTERPRETATION:  PROCEDURE: CT head without intravenous contrast    INDICATION: Altered mental status    TECHNIQUE: Multiple axial images were obtained at 5 mm intervals from the   skull base to the vertex. Sagittal and coronal reformatted images were   obtained from the axial data set. The images were reviewed in brain and   bone windows.    COMPARISON: CT head 7/23/2023    FINDINGS: The CT examination demonstrates the ventricles, cisternal   spaces, and cortical sulci to be within normal limits. There is no   midline shift or extra axial collections. The gray white differentiation   appears within normal limits. There is no intracranial hemorrhage or   acute transcortical infarct. The bony windows demonstrates no fractures.   The visualized paranasal sinuses are within normal limits. The mastoid   air cells are well aerated.    IMPRESSION: No intracranial hemorrhage or acute transcortical infarct. ON: CTH negative for hemorrhage/infarcts. FS persistently in 70s - increased D10 to 70 cc/hr. 1/2 AMP D50 given, repeat . BG 74 again at 0300. STAT BMP sent. Full AMP D50 given- repeat 146. BG on BMP 65 (prior to full amp). Mental status much improved - A&OX3. Improved tremors.       SUBJECTIVE: Patient examined at bedside. Patient's mental status has much improved since yesterday, continues to be A&Ox3. Admits to very mild nausea, denies dizziness, fevers, pain.     MEDICATIONS  (STANDING):  aMIOdarone    Tablet 200 milliGRAM(s) Oral daily  atorvastatin 20 milliGRAM(s) Oral at bedtime  cefepime   IVPB 2000 milliGRAM(s) IV Intermittent every 8 hours  chlorhexidine 2% Cloths 1 Application(s) Topical <User Schedule>  chlorhexidine 2% Cloths 1 Application(s) Topical <User Schedule>  cholestyramine Powder (Sugar-Free) 4 Gram(s) Oral two times a day  DAPTOmycin IVPB 800 milliGRAM(s) IV Intermittent every 24 hours  dextrose 10%. 1000 milliLiter(s) (70 mL/Hr) IV Continuous <Continuous>  dextrose 5%. 1000 milliLiter(s) (50 mL/Hr) IV Continuous <Continuous>  dextrose 50% Injectable 25 Gram(s) IV Push once  dextrose 50% Injectable 25 Gram(s) IV Push once  glucagon  Injectable 1 milliGRAM(s) IntraMuscular once  glucagon  Injectable 1 milliGRAM(s) IntraMuscular once  heparin   Injectable 5000 Unit(s) SubCutaneous every 8 hours  insulin lispro (ADMELOG) corrective regimen sliding scale   SubCutaneous every 6 hours  levothyroxine 50 MICROGram(s) Oral daily  loperamide 4 milliGRAM(s) Oral every 12 hours  nystatin Powder 1 Application(s) Topical three times a day  pantoprazole  Injectable 40 milliGRAM(s) IV Push daily  venlafaxine XR. 300 milliGRAM(s) Oral daily    MEDICATIONS  (PRN):  benzocaine/menthol Lozenge 2 Lozenge Oral every 4 hours PRN Sore Throat  dextrose Oral Gel 15 Gram(s) Oral once PRN Blood Glucose LESS THAN 70 milliGRAM(s)/deciliter  melatonin 5 milliGRAM(s) Oral at bedtime PRN Sleep      Drips:     ICU Vital Signs Last 24 Hrs  T(C): 36.9 (24 Aug 2023 05:56), Max: 36.9 (23 Aug 2023 14:00)  T(F): 98.4 (24 Aug 2023 05:56), Max: 98.4 (23 Aug 2023 14:00)  HR: 81 (24 Aug 2023 06:00) (78 - 90)  BP: 123/55 (24 Aug 2023 06:00) (99/54 - 144/64)  BP(mean): 79 (24 Aug 2023 06:00) (71 - 97)  ABP: --  ABP(mean): --  RR: 24 (24 Aug 2023 06:00) (15 - 29)  SpO2: 95% (24 Aug 2023 06:00) (93% - 99%)    O2 Parameters below as of 24 Aug 2023 05:00  Patient On (Oxygen Delivery Method): room air        Physical Exam:  General: NAD  HEENT: NC/AT, EOMI, PERRLA, normal hearing, neck supple w/o LAD  Pulmonary: Nonlabored breathing, no respiratory distress, CTA-B  Cardiovascular: NSR, no murmurs  Abdominal: soft, NT/ND, L JOYCE serous, midline wound vac + fistula w mccauley catheter to wall suction, R ileostomy PPP w brown stool.   Extremities: WWP, no peripheral edema, SCDs  Neuro: A/O x3, normal motor/sensation, no focal deficits  Pulses: palpable distal pulses      I&O's Summary    23 Aug 2023 07:01  -  24 Aug 2023 07:00  --------------------------------------------------------  IN: 1120 mL / OUT: 2255 mL / NET: -1135 mL        LABS:                        8.9    8.19  )-----------( 144      ( 24 Aug 2023 04:02 )             28.2     08-24    132<L>  |  102  |  24<H>  ----------------------------<  65<L>  4.1   |  23  |  0.90    Ca    8.5      24 Aug 2023 04:02  Phos  3.6     08-24  Mg     2.0     08-24    TPro  8.2  /  Alb  2.2<L>  /  TBili  3.5<H>  /  DBili  x   /  AST  96<H>  /  ALT  98<H>  /  AlkPhos  174<H>  08-23    PT/INR - ( 23 Aug 2023 13:14 )   PT: 15.5 sec;   INR: 1.37          PTT - ( 23 Aug 2023 13:14 )  PTT:35.6 sec  Urinalysis Basic - ( 24 Aug 2023 04:02 )    Color: x / Appearance: x / SG: x / pH: x  Gluc: 65 mg/dL / Ketone: x  / Bili: x / Urobili: x   Blood: x / Protein: x / Nitrite: x   Leuk Esterase: x / RBC: x / WBC x   Sq Epi: x / Non Sq Epi: x / Bacteria: x      CAPILLARY BLOOD GLUCOSE      POCT Blood Glucose.: 146 mg/dL (24 Aug 2023 04:46)  POCT Blood Glucose.: 74 mg/dL (24 Aug 2023 03:23)  POCT Blood Glucose.: 120 mg/dL (24 Aug 2023 00:49)  POCT Blood Glucose.: 74 mg/dL (23 Aug 2023 23:52)  POCT Blood Glucose.: 75 mg/dL (23 Aug 2023 22:12)  POCT Blood Glucose.: 162 mg/dL (23 Aug 2023 16:48)  POCT Blood Glucose.: 70 mg/dL (23 Aug 2023 16:14)  POCT Blood Glucose.: 68 mg/dL (23 Aug 2023 16:13)  POCT Blood Glucose.: 111 mg/dL (23 Aug 2023 13:38)    LIVER FUNCTIONS - ( 23 Aug 2023 13:14 )  Alb: 2.2 g/dL / Pro: 8.2 g/dL / ALK PHOS: 174 U/L / ALT: 98 U/L / AST: 96 U/L / GGT: x             Cultures:Culture Results:   No growth at 12 hours (08-23 @ 13:51)  Culture Results:   No growth at 12 hours (08-23 @ 13:38)  Culture Results:   No growth at 12 hours (08-23 @ 13:38)      RADIOLOGY & ADDITIONAL STUDIES:    PROCEDURE DATE:  08/23/2023          INTERPRETATION:  PROCEDURE: CT head without intravenous contrast    INDICATION: Altered mental status    TECHNIQUE: Multiple axial images were obtained at 5 mm intervals from the   skull base to the vertex. Sagittal and coronal reformatted images were   obtained from the axial data set. The images were reviewed in brain and   bone windows.    COMPARISON: CT head 7/23/2023    FINDINGS: The CT examination demonstrates the ventricles, cisternal   spaces, and cortical sulci to be within normal limits. There is no   midline shift or extra axial collections. The gray white differentiation   appears within normal limits. There is no intracranial hemorrhage or   acute transcortical infarct. The bony windows demonstrates no fractures.   The visualized paranasal sinuses are within normal limits. The mastoid   air cells are well aerated.    IMPRESSION: No intracranial hemorrhage or acute transcortical infarct.

## 2023-08-24 NOTE — PROGRESS NOTE ADULT - ATTENDING COMMENTS
Crohn's, AF, HTN s/p SBR with ileocolic resection, SB anastomosis, wound dehiscence. Now with toxic vs metabolic encephalopathy  physical as above  now oriented and mental status back to baseline  concern for sepsis vs scopolamine toxicity but more likely the latter with improvement  DC daptomycin/cefepime if BC still negative this PM with no fever and no increase in WBC  continue amiodarone  restart norvasc, losartan and metoprolol  TPN off for now, follow sugars on D10  if stable to reinsert PICC tomorrow  decision making of high complexity

## 2023-08-24 NOTE — PROGRESS NOTE ADULT - SUBJECTIVE AND OBJECTIVE BOX
SUBJECTIVE:  Patient seen and examined at bedside this AM   Vac change done yesterday. Upgraded to SICU yesterday for acute onset delirium and c/f sepsis   Still appears confused/hallucinating this AM, but A&O x 3   Denied pain, nausea, chills, lightheadedness   Voiding spontaneously with stool per ostomy       MEDICATIONS  (STANDING):  aMIOdarone    Tablet 200 milliGRAM(s) Oral daily  atorvastatin 20 milliGRAM(s) Oral at bedtime  cefepime   IVPB 2000 milliGRAM(s) IV Intermittent every 8 hours  chlorhexidine 2% Cloths 1 Application(s) Topical <User Schedule>  chlorhexidine 2% Cloths 1 Application(s) Topical <User Schedule>  cholestyramine Powder (Sugar-Free) 4 Gram(s) Oral two times a day  DAPTOmycin IVPB 800 milliGRAM(s) IV Intermittent every 24 hours  dextrose 10%. 1000 milliLiter(s) (105 mL/Hr) IV Continuous <Continuous>  dextrose 5%. 1000 milliLiter(s) (50 mL/Hr) IV Continuous <Continuous>  dextrose 50% Injectable 25 Gram(s) IV Push once  dextrose 50% Injectable 25 Gram(s) IV Push once  glucagon  Injectable 1 milliGRAM(s) IntraMuscular once  glucagon  Injectable 1 milliGRAM(s) IntraMuscular once  heparin   Injectable 5000 Unit(s) SubCutaneous every 8 hours  insulin lispro (ADMELOG) corrective regimen sliding scale   SubCutaneous every 6 hours  loperamide 4 milliGRAM(s) Oral every 12 hours  nystatin Powder 1 Application(s) Topical three times a day  pantoprazole  Injectable 40 milliGRAM(s) IV Push daily  venlafaxine XR. 300 milliGRAM(s) Oral daily    MEDICATIONS  (PRN):  benzocaine/menthol Lozenge 2 Lozenge Oral every 4 hours PRN Sore Throat  dextrose Oral Gel 15 Gram(s) Oral once PRN Blood Glucose LESS THAN 70 milliGRAM(s)/deciliter  melatonin 5 milliGRAM(s) Oral at bedtime PRN Sleep      Vital Signs Last 24 Hrs  T(C): 37.1 (24 Aug 2023 09:00), Max: 37.1 (24 Aug 2023 09:00)  T(F): 98.7 (24 Aug 2023 09:00), Max: 98.7 (24 Aug 2023 09:00)  HR: 95 (24 Aug 2023 13:00) (78 - 95)  BP: 122/58 (24 Aug 2023 13:00) (111/56 - 138/65)  BP(mean): 83 (24 Aug 2023 13:00) (75 - 97)  RR: 30 (24 Aug 2023 13:00) (15 - 30)  SpO2: 95% (24 Aug 2023 13:00) (94% - 96%)    Parameters below as of 24 Aug 2023 14:00  Patient On (Oxygen Delivery Method): room air        Physical Exam:  General: NAD, resting comfortably in bed  C/V: NSR  Pulm: Nonlabored breathing, no respiratory distress  Abd: soft, wound vac in place with visible suction, Poole drain to suction in fistula with surrounding wound manager to gravity, JOYCE drain in left abdomen with serous output, ileostomy pink, patent, and productive of loose enteric content.  Extrem: WWP, no edema, SCDs in place   Neuro: A&Ox3; no focal deficits     I&O's Summary    23 Aug 2023 07:01  -  24 Aug 2023 07:00  --------------------------------------------------------  IN: 1190 mL / OUT: 2255 mL / NET: -1065 mL    24 Aug 2023 07:01  -  24 Aug 2023 14:33  --------------------------------------------------------  IN: 500 mL / OUT: 300 mL / NET: 200 mL        LABS:                        8.9    8.19  )-----------( 144      ( 24 Aug 2023 04:02 )             28.2     08-24    132<L>  |  102  |  24<H>  ----------------------------<  65<L>  4.1   |  23  |  0.90    Ca    8.5      24 Aug 2023 04:02  Phos  3.6     08-24  Mg     2.0     08-24    TPro  8.2  /  Alb  2.2<L>  /  TBili  3.5<H>  /  DBili  x   /  AST  96<H>  /  ALT  98<H>  /  AlkPhos  174<H>  08-23    PT/INR - ( 23 Aug 2023 13:14 )   PT: 15.5 sec;   INR: 1.37          PTT - ( 23 Aug 2023 13:14 )  PTT:35.6 sec  Urinalysis Basic - ( 24 Aug 2023 04:02 )    Color: x / Appearance: x / SG: x / pH: x  Gluc: 65 mg/dL / Ketone: x  / Bili: x / Urobili: x   Blood: x / Protein: x / Nitrite: x   Leuk Esterase: x / RBC: x / WBC x   Sq Epi: x / Non Sq Epi: x / Bacteria: x      CAPILLARY BLOOD GLUCOSE      POCT Blood Glucose.: 77 mg/dL (24 Aug 2023 13:33)  POCT Blood Glucose.: 81 mg/dL (24 Aug 2023 11:23)  POCT Blood Glucose.: 78 mg/dL (24 Aug 2023 09:35)  POCT Blood Glucose.: 82 mg/dL (24 Aug 2023 07:16)  POCT Blood Glucose.: 146 mg/dL (24 Aug 2023 04:46)  POCT Blood Glucose.: 74 mg/dL (24 Aug 2023 03:23)  POCT Blood Glucose.: 120 mg/dL (24 Aug 2023 00:49)  POCT Blood Glucose.: 74 mg/dL (23 Aug 2023 23:52)  POCT Blood Glucose.: 75 mg/dL (23 Aug 2023 22:12)  POCT Blood Glucose.: 162 mg/dL (23 Aug 2023 16:48)  POCT Blood Glucose.: 70 mg/dL (23 Aug 2023 16:14)  POCT Blood Glucose.: 68 mg/dL (23 Aug 2023 16:13)    LIVER FUNCTIONS - ( 23 Aug 2023 13:14 )  Alb: 2.2 g/dL / Pro: 8.2 g/dL / ALK PHOS: 174 U/L / ALT: 98 U/L / AST: 96 U/L / GGT: x             RADIOLOGY & ADDITIONAL STUDIES:

## 2023-08-24 NOTE — PROGRESS NOTE ADULT - ASSESSMENT
78M w/ Crohn's, AFib/Flutter s/p DCCVs on amiodarone, remote ileocectomy and open appy here for SBO vs Crohns flare, s/p NGT decompression and now s/p lap TRE converted to open TRE, SBR x 3, left in discontinuity with abthera vac on 6/27, RTOR for ileocolic resection, small bowel anastomosis, and abdominal wall closure on 6/28, and s/p IR aspiration of perihepatic fluid on 7/3, stepped down to telemetery on 7/4. wound dehisence on 7/5, RTOR exlap, washout, ileocolic resection with end ileostomy, blow hole colostomy, red rubber from ileostomy to small bowel anastomosis; vicryl bridging mesh on 7/5. Transferred to SICU post op for HD monitoring. Extubated 7/6. SDU 8/2. Return to SICU 8/23 w concern for sepsis     NEURO: hold off sedating meds. dc scopolamine patch, cont outpatient effexor,   CV: Hx of Afib/flutter: s/p DCCV, on PO amiodarone, lopressor, HTN on norvasc, losartan TTE from (07/18) - PASP 64mmHg, EF 65-70%, hold lopressor/norvasc/losartan for now given concerns for sepsis. restart if BP high.   PULM: no resp distress on room air.   GI/FEN: NPO except meds and sips with TPN for nutritional support in setting of high output EC fistula, TPN/lipids paused for until line sepsis r/o.  : voids.   ENDO: ISS  ID: concern for sepsis, particularly line sepsis given prolonged PICC from 6/30, UA neg, CXR neg, bld cx negative @ 12 hrs. PICC removed. previously had C. tertium, Lactobacillis from his IR cx 7/3, and candida albicans, lactobacillus, vanc sensitive E faecium, vanc resistant E gallinarum, vanc resistant E casseliflavus, lactobacillus from his OR cx 7/5. he had completed course of abx with imipenem (6/30-7/12, 7/23-7/30)  Dapto (6/30-7/5 and 7/23-7/24). been off all abx since 7/30. start empiric dapto (8/23--) and cefepime (8/23--).   PPX: SCDs, SQH  LINES: PIVs, dc RUE PICC (6/30-8/23) // PREVIOUS: R Chest tube for iatrogenic PTX (6/27-7/3), R TLC (06/26-30). R TLC (7/5 -7/12)  WOUNDS/DRAINS: abdominal wound with vac dsg.

## 2023-08-24 NOTE — PROGRESS NOTE ADULT - ASSESSMENT
[facility] Progress Note    Patient Complaint: diarrhea    PCP:   James Hillman       LOS: 0        Initial History of Present Illness (HPI):  This is a 60 y.o. 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 PMH endometrial cancer currently receiving chemo and radiation. Patient complaint of severe ongoing painless diarrhea with associated symptoms of weakness, dehydration and joint pain that began after radiation on 8/9. Denies fever, dizziness, lightheadedness, n/v, hematemesis, abdominal pain, BRBPR, constipation or dark stools. Denies smoking, drinking, oac or NSAID use. Reports stools average 6-10x daily.     Admit labs neutropenia  Imaging notes diarrhea     Interval Hx  6 diarrhea stools s/p start of oral steroids.     Medical History:  has a past medical history of Chest pain (2002), GERD (gastroesophageal reflux disease), Lower back pain, Migraines, Mild allergic rhinitis, and Postmenopausal bleeding.    Surgical History:  has a past surgical history that includes TONSILLECTOMY; Tubal ligation; Cyst Removal (Left, 04/28/2022); Hysteroscopy with dilation and curettage of uterus (N/A, 05/23/2023); Tonsillectomy; xi robotic hysterectomy, with salpingo-oophorectomy (Bilateral, 6/5/2023); Lymph node biopsy (Left, 6/5/2023); mapping, lymph node, sentinel (Bilateral, 6/5/2023); lymphadenectomy, pelvis (Right, 6/5/2023); and Nerve repair (Right, 6/5/2023).      Objective Findings:    Vital Signs:  Temp:  [98.3 °F (36.8 °C)-99 °F (37.2 °C)]   Pulse:  []   Resp:  [18-20]   BP: (125-141)/(61-70)   SpO2:  [97 %-98 %]   Body mass index is 25.17 kg/m².      Physical Exam  Vitals and nursing note reviewed.   Constitutional:       Appearance: Normal appearance.   HENT:      Head: Normocephalic.   Pulmonary:      Effort: Pulmonary effort is normal.   Abdominal:      General: Bowel sounds are normal.      Palpations: Abdomen is soft.   Skin:      WBCs and bili increased  ?etiology  To discuss with surgeons and ICU team General: Skin is warm and dry.   Neurological:      Mental Status: She is alert and oriented to person, place, and time.   Psychiatric:         Mood and Affect: Mood normal.         Behavior: Behavior normal.         Thought Content: Thought content normal.         Judgment: Judgment normal.               Labs:  Lab Results   Component Value Date    WBC 4.00 2023    HGB 12.3 2023    HCT 35.7 (L) 2023     2023    CHOL 199 2008    TRIG 99 2020    HDL 42 2020    ALT 33 2023    AST 20 2023     2023    K 3.6 2023     2023    CREATININE 0.6 2023    BUN 11 2023    CO2 25 2023    TSH 0.137 (L) 2023             Imagin/22 CT abdomen pelvis-  Liquid stool is noted throughout the colon and rectum which are otherwise grossly unremarkable in appearance.  Otherwise, the enhanced but unopacified stomach, small bowel and colon are normal in course and caliber.  No evidence of small bowel obstruction, significant inflammatory fat stranding, free air or free fluid.  Appendix is normal.  Bowel mesentery is grossly unremarkable.       I have independently reviewed and interpreted the imaging above           Immune checkpoint inhibitor colitis. Diarrhea. Current chemotherapy. Current radiation therapy. Endometrial cancer.  Plan/ Recommendations:  1.   last dose of  Dostarlimab. Started prednisone 70mg po yesterday, Diarrhea unchanged. Rec start IV methylprednisolone 80mg IV every 12 hrs.  Stool studies processing for calprotectin and ova parasites.  Cdiff, ecoli and stool wbc negative. Stool culture prelim negative.  Ok to resume diet. Continue to monitor hydration and electrolytes.   Plan: methylprednisolone  60mg q12h for a max of 3 days. Watch for report of less than 4 stools daily. If symptoms improve prior to 3 day max transition to oral taper and taper by 10mg weekly with a goal to discontinue.      Thank you  so much for allowing us to participate in the care of Sugar Diaz . Please contact us if you have any additional questions.    Carmen Mae NP  Gastroenterology  [facility] [place] [facility]

## 2023-08-25 NOTE — PROGRESS NOTE ADULT - SUBJECTIVE AND OBJECTIVE BOX
INFECTIOUS DISEASES CONSULT FOLLOW-UP NOTE    79yo M w/ hx Crohn’s w/ multiple flareups and surgeries including bowel resections, s/p small bowel anastomosis w/ ileostomy (), admitted to ICU w/ course c/b peritonitis and wound dehiscence. ID previously following for complicated abdominal wounds including likely acalculous cholecystitis now s/p tx with Imipenem. Signed off on , reconsulted on :   Per primary team, pt was recovering well on surgical floors for continued management. However on  became acutely encephalopathic, hypotensive to 90s/50s, w/ lethargy and shaking concerning for sepsis. Upgraded to ICU at that time; suspected infectious source at the time was infected-appearing PICC line. PICC line removed and infectious workup started, however so far unrevealing (UA neg, CXR neg, BCx ngtd). Was started on Dapto and Cefepime empirically however planned for CTAP w/ PO and IV contrast to assess for intra-abdominal sources of infection.    Saw & evaluated pt at bedside, feels overall well. Was previously nauseous however now has largely resolved, no episode vomiting. No abdominal pain, chest pain/palpitations/dyspnea. Has not noticed any new rashes or skin changes. ROS otherwise negative.     ROS:   Constitutional, eyes, ENT, cardiovascular, respiratory, gastrointestinal, genitourinary, integumentary, neurological, psychiatric and heme/lymph are otherwise negative other than noted above       ANTIBIOTICS/RELEVANT:    MEDICATIONS  (STANDING):  aMIOdarone    Tablet 200 milliGRAM(s) Oral daily  amLODIPine   Tablet 10 milliGRAM(s) Oral every 24 hours  atorvastatin 20 milliGRAM(s) Oral at bedtime  chlorhexidine 2% Cloths 1 Application(s) Topical <User Schedule>  chlorhexidine 2% Cloths 1 Application(s) Topical <User Schedule>  cholestyramine Powder (Sugar-Free) 4 Gram(s) Oral two times a day  dextrose 20%. 500 milliLiter(s) (50 mL/Hr) IV Continuous <Continuous>  dextrose 5%. 1000 milliLiter(s) (50 mL/Hr) IV Continuous <Continuous>  dextrose 50% Injectable 25 Gram(s) IV Push once  dextrose 50% Injectable 25 Gram(s) IV Push once  glucagon  Injectable 1 milliGRAM(s) IntraMuscular once  glucagon  Injectable 1 milliGRAM(s) IntraMuscular once  heparin   Injectable 5000 Unit(s) SubCutaneous every 8 hours  HYDROmorphone  Injectable 0.5 milliGRAM(s) IV Push once  imipenem/cilastatin  IVPB 1000 milliGRAM(s) IV Intermittent every 8 hours  insulin lispro (ADMELOG) corrective regimen sliding scale   SubCutaneous every 6 hours  levothyroxine Injectable 40 MICROGram(s) IV Push <User Schedule>  loperamide 4 milliGRAM(s) Oral every 12 hours  losartan 25 milliGRAM(s) Oral daily  metoprolol tartrate 25 milliGRAM(s) Oral every 12 hours  nystatin Powder 1 Application(s) Topical three times a day  pantoprazole  Injectable 40 milliGRAM(s) IV Push daily  sodium chloride 3%. 500 milliLiter(s) (30 mL/Hr) IV Continuous <Continuous>  venlafaxine XR. 300 milliGRAM(s) Oral daily    MEDICATIONS  (PRN):  benzocaine/menthol Lozenge 2 Lozenge Oral every 4 hours PRN Sore Throat  dextrose Oral Gel 15 Gram(s) Oral once PRN Blood Glucose LESS THAN 70 milliGRAM(s)/deciliter  melatonin 5 milliGRAM(s) Oral at bedtime PRN Sleep        Vital Signs Last 24 Hrs  T(C): 36.8 (25 Aug 2023 13:14), Max: 38.5 (24 Aug 2023 21:00)  T(F): 98.2 (25 Aug 2023 13:14), Max: 101.3 (24 Aug 2023 21:00)  HR: 78 (25 Aug 2023 16:00) (76 - 100)  BP: 110/56 (25 Aug 2023 16:00) (91/55 - 146/65)  BP(mean): 79 (25 Aug 2023 16:00) (67 - 97)  RR: 33 (25 Aug 2023 16:00) (17 - 33)  SpO2: 97% (25 Aug 2023 16:00) (91% - 97%)    Parameters below as of 25 Aug 2023 17:00  Patient On (Oxygen Delivery Method): room air        23 @ 07:01  -  23 @ 07:00  --------------------------------------------------------  IN: 2360 mL / OUT: 2785 mL / NET: -425 mL    23 @ 07:01  -  23 @ 16:36  --------------------------------------------------------  IN: 835 mL / OUT: 1300 mL / NET: -465 mL      PHYSICAL EXAM:  Constitutional: alert, NAD  HEENT: NCAT PERRLA, no palpable lymphadenopathy.   Pulmonary: no respiratory distress and lungs were clear to auscultation bilaterally.   Heart: heart rate was normal and rhythm regular, normal S1 and S2  Abdomen: Soft nontender. Wound vac in place, pouch w/ bilious output. R ileostomy & L JOYCE drain, surrounding skin C/d/i. Ext: Site of previous PICC-line on L arm healed, c/d/i.   Neurological: no focal deficits. AOx3          LABS:                        8.7    13.88 )-----------( 253      ( 25 Aug 2023 05:20 )             27.0     08-25    127<L>  |  98  |  15  ----------------------------<  117<H>  3.5   |  24  |  1.07    Ca    8.4      25 Aug 2023 05:20  Phos  3.4     08-  Mg     1.8         TPro  7.7  /  Alb  2.2<L>  /  TBili  4.4<H>  /  DBili  3.0<H>  /  AST  92<H>  /  ALT  110<H>  /  AlkPhos  170<H>  08-25      Urinalysis Basic - ( 25 Aug 2023 08:13 )    Color: Yellow / Appearance: Clear / S.020 / pH: x  Gluc: x / Ketone: NEGATIVE  / Bili: Moderate / Urobili: 0.2 E.U./dL   Blood: x / Protein: Trace mg/dL / Nitrite: NEGATIVE   Leuk Esterase: NEGATIVE / RBC: < 5 /HPF / WBC < 5 /HPF   Sq Epi: x / Non Sq Epi: x / Bacteria: Present /HPF        MICROBIOLOGY:      RADIOLOGY & ADDITIONAL STUDIES:  Reviewed

## 2023-08-25 NOTE — PROGRESS NOTE ADULT - ASSESSMENT
77yo M w/ hx Crohn's w/ hx extensive abdominal surgeries, s/p small bowel anastomosis w/ ileostomy w/ course c.b peritonites, wound dehiscence, & likely acalculous cholecystitis (s/p tx) - found to be acutely hypotensive/lethargic w/ concern for sepsis with unclear etiology. Originally concerned for in-line sepsis now s/p PICC removal. However infectious w/u unrevealing and pt without other localizing symptoms or exam findings. ID consulted for antibiotic recs.     Given complicated abdominal wounds & recent interventions, suspect intra-abdominal etiology. Pending CTAP for further eval.     Recommendations:   - Restart imipenem 1g q8h   - Will f/u CTAP to assess for possible sources infection  - F/u BCx   - Team 1 will continue to follow, case d/w primary team

## 2023-08-25 NOTE — PROGRESS NOTE ADULT - ASSESSMENT
7/3, stepped down to telemetry on 7/4. wound dehiscence on 7/5, RTOR ex-lap, washout, ileocolic resection with end ileostomy, blow hole colostomy, red rubber from ileostomy to small bowel anastomosis; Vicryl bridging mesh on 7/5. Transferred to SICU post op for HD monitoring. Extubated 7/6. Surgical wound with decreasing in size and granulating with Vac. Tentative plan for skin graft per Plastics; timing TBD pending proper wound shrinkage and granulation   8/23: Upgraded to SICU 8/23 for acute delirium and tremors, r/o sepsis vs metabolic encephalopathy. Ammonia levels normal    8/25: Spiked temp to 101.3 overnight w/ new leukocytosis to 14     Plan:   - CT CAP w/ PO & IV contrast   - Continue IV Abx   - NPO w/Sips & chips; continue TPN   - Wound vac change Monday & Friday   - Continue Loperamide; monitor fistula drain and wound manager output   - PRN pain and nausea control   - Hx of A-fib/flutter; continue rate and rhythm control   - Encourage IS/OOB   - DVT ppx   - PT   - Tentative plan for skin graft per Plastics; timing TBD pending proper wound granulation   - Appreciate SICU care     D/w chief resident and attending

## 2023-08-25 NOTE — CHART NOTE - NSCHARTNOTEFT_GEN_A_CORE
Admitting Diagnosis:   Patient is a 78y old  Male who presents with a chief complaint of sbo (07 Aug 2023 14:14)      PAST MEDICAL & SURGICAL HISTORY:  Essential hypertension  Hypertension      Atrial fibrillation  s/p cardioversion 2010 and 2014  Pt. reports 4 DCCV  Now on Amiodarone 200mg PO bid and Eliquis 5mg PO bid  Last DCCV 4 yrs ago at [facility]      Crohn's disease  s/p partial resection of ileum      Hyperlipidemia      Hypothyroidism      History of depression  On Venlafaxine ER 150mg PO bid      H/O knee surgery      History of cataract surgery          Current Nutrition Order: NPO    PO Intake: Good (%) [   ]  Fair (50-75%) [   ] Poor (<25%) [   ]- NPO    GI Issues: soft, NT, midline wound vac w overlying pouch that covers fistula w bilious output, R ileostomy PPP no output, L JOYCE minimal serous    Pain:    Skin Integrity:    Labs:   08-25    127<L>  |  98  |  15  ----------------------------<  117<H>  3.5   |  24  |  1.07    Ca    8.4      25 Aug 2023 05:20  Phos  3.4     08-25  Mg     1.8     08-25    TPro  7.7  /  Alb  2.2<L>  /  TBili  4.4<H>  /  DBili  3.0<H>  /  AST  92<H>  /  ALT  110<H>  /  AlkPhos  170<H>  08-25    CAPILLARY BLOOD GLUCOSE      POCT Blood Glucose.: 98 mg/dL (25 Aug 2023 11:23)  POCT Blood Glucose.: 111 mg/dL (25 Aug 2023 09:24)  POCT Blood Glucose.: 106 mg/dL (25 Aug 2023 07:29)  POCT Blood Glucose.: 98 mg/dL (25 Aug 2023 01:57)  POCT Blood Glucose.: 97 mg/dL (24 Aug 2023 22:36)  POCT Blood Glucose.: 98 mg/dL (24 Aug 2023 20:37)  POCT Blood Glucose.: 86 mg/dL (24 Aug 2023 18:38)  POCT Blood Glucose.: 168 mg/dL (24 Aug 2023 16:42)  POCT Blood Glucose.: 160 mg/dL (24 Aug 2023 16:39)  POCT Blood Glucose.: 57 mg/dL (24 Aug 2023 15:54)  POCT Blood Glucose.: 49 mg/dL (24 Aug 2023 15:52)  POCT Blood Glucose.: 77 mg/dL (24 Aug 2023 13:33)      Medications:  MEDICATIONS  (STANDING):  aMIOdarone    Tablet 200 milliGRAM(s) Oral daily  amLODIPine   Tablet 10 milliGRAM(s) Oral every 24 hours  atorvastatin 20 milliGRAM(s) Oral at bedtime  chlorhexidine 2% Cloths 1 Application(s) Topical <User Schedule>  chlorhexidine 2% Cloths 1 Application(s) Topical <User Schedule>  cholestyramine Powder (Sugar-Free) 4 Gram(s) Oral two times a day  dextrose 20%. 500 milliLiter(s) (50 mL/Hr) IV Continuous <Continuous>  dextrose 5%. 1000 milliLiter(s) (50 mL/Hr) IV Continuous <Continuous>  dextrose 50% Injectable 25 Gram(s) IV Push once  dextrose 50% Injectable 25 Gram(s) IV Push once  glucagon  Injectable 1 milliGRAM(s) IntraMuscular once  glucagon  Injectable 1 milliGRAM(s) IntraMuscular once  heparin   Injectable 5000 Unit(s) SubCutaneous every 8 hours  insulin lispro (ADMELOG) corrective regimen sliding scale   SubCutaneous every 6 hours  levothyroxine Injectable 40 MICROGram(s) IV Push <User Schedule>  loperamide 4 milliGRAM(s) Oral every 12 hours  losartan 25 milliGRAM(s) Oral daily  metoprolol tartrate 25 milliGRAM(s) Oral every 12 hours  nystatin Powder 1 Application(s) Topical three times a day  pantoprazole  Injectable 40 milliGRAM(s) IV Push daily  sodium chloride 3%. 500 milliLiter(s) (30 mL/Hr) IV Continuous <Continuous>  venlafaxine XR. 300 milliGRAM(s) Oral daily    MEDICATIONS  (PRN):  benzocaine/menthol Lozenge 2 Lozenge Oral every 4 hours PRN Sore Throat  dextrose Oral Gel 15 Gram(s) Oral once PRN Blood Glucose LESS THAN 70 milliGRAM(s)/deciliter  melatonin 5 milliGRAM(s) Oral at bedtime PRN Sleep      Weight:  Daily     Daily     Weight Change:     Estimated energy needs:     Subjective:     Previous Nutrition Diagnosis:    Active [   ]  Resolved [   ]    If resolved, new PES:     Goal:    Recommendations:    Education:     Risk Level: High [   ] Moderate [   ] Low [   ] Admitting Diagnosis:   Patient is a 78y old  Male who presents with a chief complaint of sbo (07 Aug 2023 14:14)      PAST MEDICAL & SURGICAL HISTORY:  Essential hypertension  Hypertension      Atrial fibrillation  s/p cardioversion 2010 and 2014  Pt. reports 4 DCCV  Now on Amiodarone 200mg PO bid and Eliquis 5mg PO bid  Last DCCV 4 yrs ago at [facility]      Crohn's disease  s/p partial resection of ileum      Hyperlipidemia      Hypothyroidism      History of depression  On Venlafaxine ER 150mg PO bid      H/O knee surgery      History of cataract surgery          Current Nutrition Order: NPO w/ sips & chips; IV D20 [provides 100g dextrose / 340 kcals]     PO Intake: Good (%) [   ]  Fair (50-75%) [   ] Poor (<25%) [   ]- NPO    GI Issues: soft, NT, midline wound vac w overlying pouch that covers fistula w bilious output, R ileostomy PPP no output, L JOYCE minimal serous    Pain: no pain/discomfort noted    Skin Integrity: R heel DTI, LUQ JOYCE, mid abd owund vac, EC fistula, ileostomy    Labs:   08-25    127<L>  |  98  |  15  ----------------------------<  117<H>  3.5   |  24  |  1.07    Ca    8.4      25 Aug 2023 05:20  Phos  3.4     08-25  Mg     1.8     08-25    TPro  7.7  /  Alb  2.2<L>  /  TBili  4.4<H>  /  DBili  3.0<H>  /  AST  92<H>  /  ALT  110<H>  /  AlkPhos  170<H>  08-25    CAPILLARY BLOOD GLUCOSE      POCT Blood Glucose.: 98 mg/dL (25 Aug 2023 11:23)  POCT Blood Glucose.: 111 mg/dL (25 Aug 2023 09:24)  POCT Blood Glucose.: 106 mg/dL (25 Aug 2023 07:29)  POCT Blood Glucose.: 98 mg/dL (25 Aug 2023 01:57)  POCT Blood Glucose.: 97 mg/dL (24 Aug 2023 22:36)  POCT Blood Glucose.: 98 mg/dL (24 Aug 2023 20:37)  POCT Blood Glucose.: 86 mg/dL (24 Aug 2023 18:38)  POCT Blood Glucose.: 168 mg/dL (24 Aug 2023 16:42)  POCT Blood Glucose.: 160 mg/dL (24 Aug 2023 16:39)  POCT Blood Glucose.: 57 mg/dL (24 Aug 2023 15:54)  POCT Blood Glucose.: 49 mg/dL (24 Aug 2023 15:52)  POCT Blood Glucose.: 77 mg/dL (24 Aug 2023 13:33)      Medications:  MEDICATIONS  (STANDING):  aMIOdarone    Tablet 200 milliGRAM(s) Oral daily  amLODIPine   Tablet 10 milliGRAM(s) Oral every 24 hours  atorvastatin 20 milliGRAM(s) Oral at bedtime  chlorhexidine 2% Cloths 1 Application(s) Topical <User Schedule>  chlorhexidine 2% Cloths 1 Application(s) Topical <User Schedule>  cholestyramine Powder (Sugar-Free) 4 Gram(s) Oral two times a day  dextrose 20%. 500 milliLiter(s) (50 mL/Hr) IV Continuous <Continuous>  dextrose 5%. 1000 milliLiter(s) (50 mL/Hr) IV Continuous <Continuous>  dextrose 50% Injectable 25 Gram(s) IV Push once  dextrose 50% Injectable 25 Gram(s) IV Push once  glucagon  Injectable 1 milliGRAM(s) IntraMuscular once  glucagon  Injectable 1 milliGRAM(s) IntraMuscular once  heparin   Injectable 5000 Unit(s) SubCutaneous every 8 hours  insulin lispro (ADMELOG) corrective regimen sliding scale   SubCutaneous every 6 hours  levothyroxine Injectable 40 MICROGram(s) IV Push <User Schedule>  loperamide 4 milliGRAM(s) Oral every 12 hours  losartan 25 milliGRAM(s) Oral daily  metoprolol tartrate 25 milliGRAM(s) Oral every 12 hours  nystatin Powder 1 Application(s) Topical three times a day  pantoprazole  Injectable 40 milliGRAM(s) IV Push daily  sodium chloride 3%. 500 milliLiter(s) (30 mL/Hr) IV Continuous <Continuous>  venlafaxine XR. 300 milliGRAM(s) Oral daily    MEDICATIONS  (PRN):  benzocaine/menthol Lozenge 2 Lozenge Oral every 4 hours PRN Sore Throat  dextrose Oral Gel 15 Gram(s) Oral once PRN Blood Glucose LESS THAN 70 milliGRAM(s)/deciliter  melatonin 5 milliGRAM(s) Oral at bedtime PRN Sleep      Weight: 101kg [9 Aug 2023]  Daily   102.1kg [10 July 2023]  Daily 99.9kg [9 July 2023]    Weight Change: minor wt fluctuations throughout admission. Recommend weekly weights for trending/monitoring adequacy of nutrition provision    Estimated energy needs:   Ideal body weight (86.4kg) used for calculations as pt >100% of IBW, BMI <30 per [facility] Standards of Care. Needs estimated for age and adjusted for current clinical status, increased needs for post-op & open abd wound healing    Calories: 4035-5479 kcals based on 30-35 kcals/kg  Protein: 172.8-216 g protein based on 2.0-2.5g protein/kg  *Fluid needs per team    Subjective:   78M w/ Crohn's, AFib/Flutter s/p DCCVs on amiodarone, remote ileocectomy and open appy here for SBO vs Crohns flare, s/p NGT decompression and now s/p lap TRE converted to open TRE, SBR x 3, left in discontinuity with abthera vac on 6/27, RTOR for ileocolic resection, small bowel anastomosis, and abdominal wall closure on 6/28, and s/p IR aspiration of perihepatic fluid on 7/3, stepped down to telemetry on 7/4. wound dehiscence on 7/5, RTOR ex-lap, washout, ileocolic resection with end ileostomy, blow hole colostomy, red rubber from ileostomy to small bowel anastomosis; Vicryl bridging mesh on 7/5. Transferred to SICU post op for HD monitoring. Extubated 7/6. Surgical wound with decreasing in size and granulating with Vac. Decreasing fistula output.    Chart reviewed. Pt seen this AM on 5 EA with IDT during AM rounds, on room air. TMax: 38.5 C. HR WNL, BP trending low. MAPs >65 mmHg, not on pressors. I&Os x 24hrs: 2360ml [1010ml D10 + 1050ml D20] / 2785ml [10ml bulb output + 375ml wound vac + 350ml conrado pouch + 350ml fistula output] = -425ml net. Currently NPO, TPN stopped 8/23. WBC elevated. On IV dextrose [provides 100g dextrose / 340 kcals]. Na 127 today, on IV 3% NaCl. K+ 3.5, borderline low- monitor & replenish prn. Low glucose in last 24hrs. LFTs trending high. Meds: on synthroid [administer 30-60 minutes before food; separate at least 4hrs from calcium or iron-containing products or bile acid sequestrants], cholestyramine, imodium, protonix. Mild nausea noted yesterday. Plan to replace PICC & restart PN once afebrile, no s/s sepsis. RDN will continue to monitor, reassess, and intervene as appropriate.     Previous Nutrition Diagnosis:  Increased Nutrient Needs R/T physiological demands for nutrient AEB post-op wound healing    Active [  X ]  Resolved [   ]    If resolved, new PES:     Goal:  Restart TPN as soon as medically feasible  Pt will meet at least 75% of protein & energy needs via most appropriate route for nutrition     Recommendations:  1. When medically feasible- recommend obtaining parenteral access [PICC for TPN]  - goal: 427g Dex, 175g AA, 50g SMOFlipids; provides 2651.8 kcals, 175g protein, GIR 2.94 mg/kg/min  - restart at 150g Dex and increase to goal as indicated  - fluids & lytes per team [monitor outputs & adjust prn]  2. Weekly lipid panel  - hold lipid infusion if TG >400  3. Daily BMP/Mg/Phos, POC BG Q4-6hrs  4. GI  - cholestyramine, antiemetic prn  5. Pain regimen per team  6. Monitor feasibility of EN/PO diet  7. Recommend weekly weights for trending  8. Monitor clinical course & adjust recommendations prn    Education: deferred    Risk Level: High [  X ] Moderate [   ] Low [   ]

## 2023-08-25 NOTE — PROGRESS NOTE ADULT - ATTENDING COMMENTS
Re-consulted for new fever and AMS.  Last seen by ID on 7/27 and IV imipenem finished on 7/29. Since then patient slowly improved and was on the floor.  However on 8/23 patient acutely found to be in AMS, hypotensive. His R PICC site looked infected so it was removed. He was pan-cultured and transferred to SICU. dapto/cefepime given. UA neg, BCx ngtd, COVID neg. He currently feels better, denied n/v/d, abdominal pain, CP, cough. Abdomen with surgical wound and VAC, much better appearance than I last saw him.  R arm prior PICC site looks good, no e/o infection. WBC went up to 13.8. CT c/a/p obtained - increased small right pleural effusion and adjacent atelectasis, cannot exclude superimposed pneumonia. No intraabominal pathology c/f infection. Unclear cause of fever - could be due to infected PICC however now source is out and R arm looks ok. Could be PNA given CT finding however without respiratory symptoms.  For now given imipenem 1g IV q8h. f/u BCx. If no e/o infection, then will consider narrow abx vs stopping it.     Team 1 will follow you.  Dr. Neville is covering me this weekend.  Case d/w primary team.    Angie Frye MD, MS  Infectious Disease attending  work cell [phone]   For any questions during evening/weekend/holiday, please page ID on call

## 2023-08-25 NOTE — PROGRESS NOTE ADULT - SUBJECTIVE AND OBJECTIVE BOX
ON:  on D20, 100 cc of 3% NS given. Repeat at MN: 129 & an additional 100ml 3% saline given. Febrile to 101.2 rectally - BCx drawn, CXR: no opacities/effusions. Tylenol given       SUBJECTIVE: Patient examined at bedside. Patient resting comfortably with no acute complaints. Denies nausea, dizziness, subjective fevers.     MEDICATIONS  (STANDING):  aMIOdarone    Tablet 200 milliGRAM(s) Oral daily  amLODIPine   Tablet 10 milliGRAM(s) Oral every 24 hours  atorvastatin 20 milliGRAM(s) Oral at bedtime  chlorhexidine 2% Cloths 1 Application(s) Topical <User Schedule>  chlorhexidine 2% Cloths 1 Application(s) Topical <User Schedule>  cholestyramine Powder (Sugar-Free) 4 Gram(s) Oral two times a day  dextrose 20%. 500 milliLiter(s) (50 mL/Hr) IV Continuous <Continuous>  dextrose 5%. 1000 milliLiter(s) (50 mL/Hr) IV Continuous <Continuous>  dextrose 50% Injectable 25 Gram(s) IV Push once  dextrose 50% Injectable 25 Gram(s) IV Push once  glucagon  Injectable 1 milliGRAM(s) IntraMuscular once  glucagon  Injectable 1 milliGRAM(s) IntraMuscular once  heparin   Injectable 5000 Unit(s) SubCutaneous every 8 hours  insulin lispro (ADMELOG) corrective regimen sliding scale   SubCutaneous every 6 hours  levothyroxine Injectable 40 MICROGram(s) IV Push <User Schedule>  loperamide 4 milliGRAM(s) Oral every 12 hours  losartan 25 milliGRAM(s) Oral daily  metoprolol tartrate 25 milliGRAM(s) Oral every 12 hours  nystatin Powder 1 Application(s) Topical three times a day  pantoprazole  Injectable 40 milliGRAM(s) IV Push daily  sodium chloride 3% Bolus 100 milliLiter(s) IV Bolus once  venlafaxine XR. 300 milliGRAM(s) Oral daily    MEDICATIONS  (PRN):  benzocaine/menthol Lozenge 2 Lozenge Oral every 4 hours PRN Sore Throat  dextrose Oral Gel 15 Gram(s) Oral once PRN Blood Glucose LESS THAN 70 milliGRAM(s)/deciliter  melatonin 5 milliGRAM(s) Oral at bedtime PRN Sleep      ICU Vital Signs Last 24 Hrs  T(C): 36.8 (25 Aug 2023 05:00), Max: 38.5 (24 Aug 2023 21:00)  T(F): 98.2 (25 Aug 2023 05:00), Max: 101.3 (24 Aug 2023 21:00)  HR: 78 (25 Aug 2023 05:00) (76 - 100)  BP: 114/56 (25 Aug 2023 05:00) (99/56 - 166/62)  BP(mean): 81 (25 Aug 2023 05:00) (70 - 97)  ABP: --  ABP(mean): --  RR: 18 (25 Aug 2023 05:00) (17 - 30)  SpO2: 94% (25 Aug 2023 05:00) (91% - 97%)    O2 Parameters below as of 25 Aug 2023 05:00  Patient On (Oxygen Delivery Method): room air        Physical Exam:  General: NAD  HEENT: NC/AT, EOMI, PERRLA, normal hearing, no oral lesions, neck supple w/o LAD  Pulmonary: Nonlabored breathing, no respiratory distress, CTA-B  Cardiovascular: NSR, no murmurs  Abdominal: soft, NT, midline wound vac w overlying pouch that covers fistula w bilious output, R ileostomy PPP no output, L JOYCE serous  Extremities: WWP, no peripheral edema  Neuro: A/O x3, CNs II-XII grossly intact, normal motor/sensation, no focal deficits  Pulses: palpable distal pulses        I&O's Summary    24 Aug 2023 07:01  -  25 Aug 2023 07:00  --------------------------------------------------------  IN: 2210 mL / OUT: 2335 mL / NET: -125 mL        LABS:                        8.7    13.88 )-----------( 253      ( 25 Aug 2023 05:20 )             27.0     08-25    127<L>  |  98  |  15  ----------------------------<  117<H>  3.5   |  24  |  1.07    Ca    8.4      25 Aug 2023 05:20  Phos  3.4     08-25  Mg     1.8     08-25    TPro  7.7  /  Alb  2.2<L>  /  TBili  4.4<H>  /  DBili  3.0<H>  /  AST  92<H>  /  ALT  110<H>  /  AlkPhos  170<H>  08-25    PT/INR - ( 23 Aug 2023 13:14 )   PT: 15.5 sec;   INR: 1.37          PTT - ( 23 Aug 2023 13:14 )  PTT:35.6 sec  Urinalysis Basic - ( 25 Aug 2023 05:20 )    Color: x / Appearance: x / SG: x / pH: x  Gluc: 117 mg/dL / Ketone: x  / Bili: x / Urobili: x   Blood: x / Protein: x / Nitrite: x   Leuk Esterase: x / RBC: x / WBC x   Sq Epi: x / Non Sq Epi: x / Bacteria: x      CAPILLARY BLOOD GLUCOSE      POCT Blood Glucose.: 106 mg/dL (25 Aug 2023 07:29)  POCT Blood Glucose.: 98 mg/dL (25 Aug 2023 01:57)  POCT Blood Glucose.: 97 mg/dL (24 Aug 2023 22:36)  POCT Blood Glucose.: 98 mg/dL (24 Aug 2023 20:37)  POCT Blood Glucose.: 86 mg/dL (24 Aug 2023 18:38)  POCT Blood Glucose.: 168 mg/dL (24 Aug 2023 16:42)  POCT Blood Glucose.: 160 mg/dL (24 Aug 2023 16:39)  POCT Blood Glucose.: 57 mg/dL (24 Aug 2023 15:54)  POCT Blood Glucose.: 49 mg/dL (24 Aug 2023 15:52)  POCT Blood Glucose.: 77 mg/dL (24 Aug 2023 13:33)  POCT Blood Glucose.: 81 mg/dL (24 Aug 2023 11:23)  POCT Blood Glucose.: 78 mg/dL (24 Aug 2023 09:35)    LIVER FUNCTIONS - ( 25 Aug 2023 05:20 )  Alb: 2.2 g/dL / Pro: 7.7 g/dL / ALK PHOS: 170 U/L / ALT: 110 U/L / AST: 92 U/L / GGT: x             Cultures:Culture Results:   No growth at 1 day. (08-23 @ 13:51)  Culture Results:   No growth at 1 day. (08-23 @ 13:38)  Culture Results:   No growth at 1 day. (08-23 @ 13:38)      RADIOLOGY & ADDITIONAL STUDIES:     ON:  on D20, 100 cc of 3% NS given. Repeat at MN: 129 & an additional 100ml 3% saline given. Febrile to 101.2 rectally - BCx drawn, CXR: no opacities/effusions. Tylenol given       SUBJECTIVE: Patient examined at bedside. Patient resting comfortably with no acute complaints. Denies nausea, dizziness, subjective fevers.     MEDICATIONS  (STANDING):  aMIOdarone    Tablet 200 milliGRAM(s) Oral daily  amLODIPine   Tablet 10 milliGRAM(s) Oral every 24 hours  atorvastatin 20 milliGRAM(s) Oral at bedtime  chlorhexidine 2% Cloths 1 Application(s) Topical <User Schedule>  chlorhexidine 2% Cloths 1 Application(s) Topical <User Schedule>  cholestyramine Powder (Sugar-Free) 4 Gram(s) Oral two times a day  dextrose 20%. 500 milliLiter(s) (50 mL/Hr) IV Continuous <Continuous>  dextrose 5%. 1000 milliLiter(s) (50 mL/Hr) IV Continuous <Continuous>  dextrose 50% Injectable 25 Gram(s) IV Push once  dextrose 50% Injectable 25 Gram(s) IV Push once  glucagon  Injectable 1 milliGRAM(s) IntraMuscular once  glucagon  Injectable 1 milliGRAM(s) IntraMuscular once  heparin   Injectable 5000 Unit(s) SubCutaneous every 8 hours  insulin lispro (ADMELOG) corrective regimen sliding scale   SubCutaneous every 6 hours  levothyroxine Injectable 40 MICROGram(s) IV Push <User Schedule>  loperamide 4 milliGRAM(s) Oral every 12 hours  losartan 25 milliGRAM(s) Oral daily  metoprolol tartrate 25 milliGRAM(s) Oral every 12 hours  nystatin Powder 1 Application(s) Topical three times a day  pantoprazole  Injectable 40 milliGRAM(s) IV Push daily  sodium chloride 3% Bolus 100 milliLiter(s) IV Bolus once  venlafaxine XR. 300 milliGRAM(s) Oral daily    MEDICATIONS  (PRN):  benzocaine/menthol Lozenge 2 Lozenge Oral every 4 hours PRN Sore Throat  dextrose Oral Gel 15 Gram(s) Oral once PRN Blood Glucose LESS THAN 70 milliGRAM(s)/deciliter  melatonin 5 milliGRAM(s) Oral at bedtime PRN Sleep      ICU Vital Signs Last 24 Hrs  T(C): 36.8 (25 Aug 2023 05:00), Max: 38.5 (24 Aug 2023 21:00)  T(F): 98.2 (25 Aug 2023 05:00), Max: 101.3 (24 Aug 2023 21:00)  HR: 78 (25 Aug 2023 05:00) (76 - 100)  BP: 114/56 (25 Aug 2023 05:00) (99/56 - 166/62)  BP(mean): 81 (25 Aug 2023 05:00) (70 - 97)  ABP: --  ABP(mean): --  RR: 18 (25 Aug 2023 05:00) (17 - 30)  SpO2: 94% (25 Aug 2023 05:00) (91% - 97%)    O2 Parameters below as of 25 Aug 2023 05:00  Patient On (Oxygen Delivery Method): room air        Physical Exam:  General: NAD  HEENT: NC/AT, EOMI, PERRLA, normal hearing, no oral lesions, neck supple w/o LAD  Pulmonary: Nonlabored breathing, no respiratory distress, CTA-B  Cardiovascular: NSR, no murmurs  Abdominal: soft, NT, midline wound vac w overlying pouch that covers fistula w bilious output, R ileostomy PPP no output, L JOYCE minimal serous  Extremities: WWP, no peripheral edema  Neuro: A/O x3, CNs II-XII grossly intact, normal motor/sensation, no focal deficits  Pulses: palpable distal pulses        I&O's Summary    24 Aug 2023 07:01  -  25 Aug 2023 07:00  --------------------------------------------------------  IN: 2210 mL / OUT: 2335 mL / NET: -125 mL        LABS:                        8.7    13.88 )-----------( 253      ( 25 Aug 2023 05:20 )             27.0     08-25    127<L>  |  98  |  15  ----------------------------<  117<H>  3.5   |  24  |  1.07    Ca    8.4      25 Aug 2023 05:20  Phos  3.4     08-25  Mg     1.8     08-25    TPro  7.7  /  Alb  2.2<L>  /  TBili  4.4<H>  /  DBili  3.0<H>  /  AST  92<H>  /  ALT  110<H>  /  AlkPhos  170<H>  08-25    PT/INR - ( 23 Aug 2023 13:14 )   PT: 15.5 sec;   INR: 1.37          PTT - ( 23 Aug 2023 13:14 )  PTT:35.6 sec  Urinalysis Basic - ( 25 Aug 2023 05:20 )    Color: x / Appearance: x / SG: x / pH: x  Gluc: 117 mg/dL / Ketone: x  / Bili: x / Urobili: x   Blood: x / Protein: x / Nitrite: x   Leuk Esterase: x / RBC: x / WBC x   Sq Epi: x / Non Sq Epi: x / Bacteria: x      CAPILLARY BLOOD GLUCOSE      POCT Blood Glucose.: 106 mg/dL (25 Aug 2023 07:29)  POCT Blood Glucose.: 98 mg/dL (25 Aug 2023 01:57)  POCT Blood Glucose.: 97 mg/dL (24 Aug 2023 22:36)  POCT Blood Glucose.: 98 mg/dL (24 Aug 2023 20:37)  POCT Blood Glucose.: 86 mg/dL (24 Aug 2023 18:38)  POCT Blood Glucose.: 168 mg/dL (24 Aug 2023 16:42)  POCT Blood Glucose.: 160 mg/dL (24 Aug 2023 16:39)  POCT Blood Glucose.: 57 mg/dL (24 Aug 2023 15:54)  POCT Blood Glucose.: 49 mg/dL (24 Aug 2023 15:52)  POCT Blood Glucose.: 77 mg/dL (24 Aug 2023 13:33)  POCT Blood Glucose.: 81 mg/dL (24 Aug 2023 11:23)  POCT Blood Glucose.: 78 mg/dL (24 Aug 2023 09:35)    LIVER FUNCTIONS - ( 25 Aug 2023 05:20 )  Alb: 2.2 g/dL / Pro: 7.7 g/dL / ALK PHOS: 170 U/L / ALT: 110 U/L / AST: 92 U/L / GGT: x             Cultures:Culture Results:   No growth at 1 day. (08-23 @ 13:51)  Culture Results:   No growth at 1 day. (08-23 @ 13:38)  Culture Results:   No growth at 1 day. (08-23 @ 13:38)      RADIOLOGY & ADDITIONAL STUDIES:

## 2023-08-25 NOTE — PROGRESS NOTE ADULT - ASSESSMENT
78M w/ Crohn's, AFib/Flutter s/p DCCVs on amiodarone, remote ileocectomy and open appy here for SBO vs Crohns flare, s/p NGT decompression and now s/p lap TRE converted to open TRE, SBR x 3, left in discontinuity with abthera vac on 6/27, RTOR for ileocolic resection, small bowel anastomosis, and abdominal wall closure on 6/28, and s/p IR aspiration of perihepatic fluid on 7/3, stepped down to telemetery on 7/4. wound dehisence on 7/5, RTOR exlap, washout, ileocolic resection with end ileostomy, blow hole colostomy, red rubber from ileostomy to small bowel anastomosis; vicryl bridging mesh on 7/5. Transferred to SICU post op for HD monitoring. Extubated 7/6. SDU 8/2. Return to SICU 8/23 w concern for sepsis     NEURO: hold off sedating meds. dc scopolamine patch, cont outpatient effexor,   CV: Hx of Afib/flutter: s/p DCCV, on PO amiodarone, lopressor, HTN on norvasc, losartan TTE from (07/18) - PASP 64mmHg, EF 65-70%,   PULM: no resp distress on room air.   GI/FEN: NPO except meds and sips with TPN for nutritional support in setting of high output EC fistula, TPN/lipids held for now due to concerns for line sepsis. D20 @ 50cc/hr for hypoglycemia. Hypertonic saline @ 100cc/hr for hyponatremia 2/2 free water intake, SIADH.  : Voids  ENDO: ISS. IV Synthroid, TSH 9 (8/23), recheck in 2-3 weeks.  ID: concern for sepsis, particularly line sepsis given prolonged PICC from 6/30, UA neg, CXR neg, bld cx NGTD. remove PICC. previously had C. tertium, Lactobacillis from his IR cx 7/3, and candida albicans, lactobacillus, vanc sensitive E faecium, vanc resistant E gallinarum, vanc resistant E casseliflavus, lactobacillus from his OR cx 7/5. he had completed course of abx with imipenem (6/30-7/12, 7/23-7/30)  Dapto (6/30-7/5 and 7/23-7/24). been off all abx since 7/30. start empiric dapto (8/23-24) and cefepime (8/23-24). Pending CTAP w/ PO and IV contrast (8/25).  PPX: SCDs, SQH  LINES: PIVs,  dc RUE PICC (6/30-8/23) // PREVIOUS: R Chest tube for iatrogenic PTX (6/27-7/3), R TLC (06/26-30). R TLC (7/5 -7/12)  WOUNDS/DRAINS: abdominal wound with vac dsg.

## 2023-08-25 NOTE — CONSULT NOTE ADULT - SUBJECTIVE AND OBJECTIVE BOX
HPI:    Briefly, 79 yo M w/ h/o Crohn's, AFib/Flutter s/p DCCVs on amiodarone, remote ileocectomy and open appendectomy at [facility] for SBO vs Crohns flare, s/p NGT decompression and now s/p lap TRE which was converted to open TRE, SBR x 3, left in discontinuity with abthera vac on , RTOR for ileocolic resection, small bowel anastomosis, and abdominal wall closure on , and s/p IR aspiration of perihepatic fluid on 7/3, stepped down to telemetery on . Course further cb wound dehiscence on , RTOR exlap, washout, ileocolic resection with end ileostomy, blow hole colostomy, red rubber from ileostomy to small bowel anastomosis; vicryl bridging mesh on . He has had a prolonged stay in SICU from  to  for further management of his complicated abdominal wound which he had gradually developed a high output fistula from and subsequently requiring TPN via PICC for nutritional support since .    Pt was transferred back to SICU w/ cf sepsis, possibly line sepsis. Today noted to have increased lethargy and questionable shaking episode today,  he had no fevers, no leukocytosis, SBP was borderline at 99/54, MAP 71, non-tachycardic, on scopolamine patch from  for nausea, but no other sedating meds. Of note, he had previously grown  C. tertium, Lactobacillis from his IR cx 7/3, and candida albicans, lactobacillus, vanc sensitive E faecium, vanc resistant E gallinarum, vanc resistant E casseliflavus, lactobacillus from his OR cx . Completed course of abx with imipenem (-, -)  Dapto (- and -).     EP consulted for accelerated junctional rhythm with elevated LFTs on amio and metoprolol. Tele shows SR and accelerated Junctional rhythms rates . EKg from this AM shows accelerated junctional @79 bpm. Pt was en route to CT scan of abdomen. Noted to be jaundice awake, appropriately responsive,  normotensive and in no acute distress. See recs below.    PAST MEDICAL & SURGICAL HISTORY:  Essential hypertension  Hypertension  Atrial fibrillation  Crohn's disease   s/p partial resection of ileum  Hyperlipidemia  Hypothyroidism  History of depression  On Venlafaxine ER 150mg PO bid  H/O knee surgery  History of cataract surgery    Social History: no smoking, no drugs, no algohol    Inpatient Medications:   aMIOdarone    Tablet 200 milliGRAM(s) Oral daily  amLODIPine   Tablet 10 milliGRAM(s) Oral every 24 hours  atorvastatin 20 milliGRAM(s) Oral at bedtime  benzocaine/menthol Lozenge 2 Lozenge Oral every 4 hours PRN  cholestyramine Powder (Sugar-Free) 4 Gram(s) Oral two times a day  heparin   Injectable 5000 Unit(s) SubCutaneous every 8 hours  insulin lispro (ADMELOG) corrective regimen sliding scale   SubCutaneous every 6 hours  levothyroxine Injectable 40 MICROGram(s) IV Push <User Schedule>  loperamide 4 milliGRAM(s) Oral every 12 hours  losartan 25 milliGRAM(s) Oral daily  melatonin 5 milliGRAM(s) Oral at bedtime PRN  metoprolol tartrate 25 milliGRAM(s) Oral every 12 hours  nystatin Powder 1 Application(s) Topical three times a day  pantoprazole  Injectable 40 milliGRAM(s) IV Push daily  sodium chloride 3%. 500 milliLiter(s) IV Continuous <Continuous>  venlafaxine XR. 300 milliGRAM(s) Oral daily    Allergies: penicillin (Angioedema)    ROS:   CONSTITUTIONAL: No fever, weight loss + fatigue  EYES: Pt denies  RESPIRATORY: No cough, wheezing, chills or hemoptysis; No Shortness of Breath  CARDIOVASCULAR: see HPI  GASTROINTESTINAL: Pt denies  NEUROLOGICAL: Pt denies  SKIN: Pt denies   PSYCHIATRIC: Pt denies  HEME/LYMPH: Pt denies    PHYSICAL:  T(C): 36.8 (23 @ 13:14), Max: 38.5 (23 @ 21:00)  HR: 79 (23 @ 15:30) (76 - 100)  BP: 113/59 (23 @ 15:20) (91/55 - 166/62)  RR: 30 (23 @ 15:30) (17 - 30)  SpO2: 97% (23 @ 15:30) (91% - 97%)    Appearance: Chronically ill appearing, jaundice, but in no acute distress  Cardiovascular: Normal S1 S2, No JVD  Respiratory: Lungs clear to auscultation bilaterally.  No wheeze, rhonchi, rales noted  Neurologic:  No deficit noted  Skin: juandice       LABS:                        8.7    13.88 )------( 253      ( 25 Aug 2023 05:20 )             27.0     127<L>  |  98  |  15  ----------------------------<  117<H>  3.5   |  24  |  1.07    Ca    8.4      25 Aug 2023 05:20  Phos  3.4     08-  Mg     1.8     08-    TPro  7.7  /  Alb  2.2<L>  /  TBili  4.4<H>  /  DBili  3.0<H>  /  AST  92<H>  /  ALT  110<H>  /  AlkPhos  170<H>  -    Bilirubin Total: 3.6 mg/dL, Bilirubin Direct: 2.5 mg/dL, Alkaline Phosphatase: 168 U/L,Aspartate Aminotransferase (AST/SGOT): 108 U/L,Alanine Aminotransferase (ALT/SGPT): 94 U/L    EK23: Accelerated Junctional Rhythm @79 bpm    Telemetry: NSR 70-90s    TTE Echo Complete w/o Contrast w/ Doppler (23 @ 14:59) >  CONCLUSIONS:   1. Normal left ventricular size and systolic function.   2. Mild symmetric left ventricular hypertrophy.   3. Grade III left ventricular diastolic dysfunction.   4. Normal right ventricular size and systolic function.   5. Mildly dilated left atrium.   6. Aortic sclerosis without significant stenosis.   7. Mild aortic regurgitation.   8. Mild tricuspid regurgitation.   9. Pulmonary hypertension present, pulmonary artery systolic pressure is 63 mmHg.  10. No pericardial effusion.  11. Compared to the previous TTE performed on 2022, PASP is higher in this study.  The left ventricle is normal in size and systolic function with a calculated ejection fraction of65-70%.    Prior EP procedures:  s/p cardioversion  and   reports 4 DCCV,  Last 4 yrs ago at [facility]    Cath / stress / Cardiac CTa: none    Assessment & Plan:  79 yo M w/ h/o Crohn's, AFib/Flutter s/p multiple DCCVs on amio, with a complicated and prolonged abdominal surgical history including open TRE, SBR x 3, RTOR x2 for exlap further cb high output fistula from abd wound, subsequently requiring TPN via PICC for nutritional support since , was eventually downgraded but now returns to SICU for further management of sepsis ISO line+/- abdominal source, now with encephalopathy. EP consulted regarding junctional rhythm and elevated LFTs. He is on amiodarone and metoprolol. His EKG and tele show him to be in an accelerated junctional rhythm which is not concerning. He is normotensive. In the setting of his elevated LFTs and bilirubinemia (likely r/t long-term TPN) it is recommended to stop amiodarone for now. May continue metoprolol, as he is not bradycardic and only in an accelerated junctional rhythm. EP will continue to follow.

## 2023-08-25 NOTE — PROGRESS NOTE ADULT - SUBJECTIVE AND OBJECTIVE BOX
SUBJECTIVE:   Patient seen and examined at bedside this AM   Spiked temp to 101.3 overnight; repeat cultures drawn.   Patient well appearing this AM and says he feels well   Denied abdominal pain, nausea or chills   Voiding spontaneously and having enteric output per ostomy       MEDICATIONS  (STANDING):  aMIOdarone    Tablet 200 milliGRAM(s) Oral daily  amLODIPine   Tablet 10 milliGRAM(s) Oral every 24 hours  atorvastatin 20 milliGRAM(s) Oral at bedtime  chlorhexidine 2% Cloths 1 Application(s) Topical <User Schedule>  chlorhexidine 2% Cloths 1 Application(s) Topical <User Schedule>  cholestyramine Powder (Sugar-Free) 4 Gram(s) Oral two times a day  dextrose 20%. 500 milliLiter(s) (75 mL/Hr) IV Continuous <Continuous>  dextrose 5%. 1000 milliLiter(s) (50 mL/Hr) IV Continuous <Continuous>  dextrose 50% Injectable 25 Gram(s) IV Push once  dextrose 50% Injectable 25 Gram(s) IV Push once  glucagon  Injectable 1 milliGRAM(s) IntraMuscular once  glucagon  Injectable 1 milliGRAM(s) IntraMuscular once  heparin   Injectable 5000 Unit(s) SubCutaneous every 8 hours  insulin lispro (ADMELOG) corrective regimen sliding scale   SubCutaneous every 6 hours  levothyroxine Injectable 40 MICROGram(s) IV Push <User Schedule>  loperamide 4 milliGRAM(s) Oral every 12 hours  losartan 25 milliGRAM(s) Oral daily  metoprolol tartrate 25 milliGRAM(s) Oral every 12 hours  nystatin Powder 1 Application(s) Topical three times a day  pantoprazole  Injectable 40 milliGRAM(s) IV Push daily  venlafaxine XR. 300 milliGRAM(s) Oral daily    MEDICATIONS  (PRN):  benzocaine/menthol Lozenge 2 Lozenge Oral every 4 hours PRN Sore Throat  dextrose Oral Gel 15 Gram(s) Oral once PRN Blood Glucose LESS THAN 70 milliGRAM(s)/deciliter  melatonin 5 milliGRAM(s) Oral at bedtime PRN Sleep      Vital Signs Last 24 Hrs  T(C): 36.8 (25 Aug 2023 05:00), Max: 38.5 (24 Aug 2023 21:00)  T(F): 98.2 (25 Aug 2023 05:00), Max: 101.3 (24 Aug 2023 21:00)  HR: 78 (25 Aug 2023 05:00) (76 - 100)  BP: 114/56 (25 Aug 2023 05:00) (99/56 - 166/62)  BP(mean): 81 (25 Aug 2023 05:00) (70 - 97)  RR: 18 (25 Aug 2023 05:00) (17 - 30)  SpO2: 94% (25 Aug 2023 05:00) (91% - 97%)    Parameters below as of 25 Aug 2023 05:00  Patient On (Oxygen Delivery Method): room air        Physical Exam:  General: NAD, resting comfortably in bed  C/V: NSR  Pulm: Nonlabored breathing, no respiratory distress  Abd: soft, wound vac in place with visible suction, Poole drain to suction in fistula with surrounding wound manager to gravity, JOYCE drain in left abdomen with serous output, ileostomy pink, patent, and productive of loose enteric content.  Extrem: WWP, no edema, SCDs in place   Neuro: A&Ox3; no focal deficits     I&O's Summary    24 Aug 2023 07:01  -  25 Aug 2023 07:00  --------------------------------------------------------  IN: 2210 mL / OUT: 2335 mL / NET: -125 mL        LABS:                        8.7    13.88 )-----------( 253      ( 25 Aug 2023 05:20 )             27.0     08-25    127<L>  |  98  |  15  ----------------------------<  117<H>  3.5   |  24  |  1.07    Ca    8.4      25 Aug 2023 05:20  Phos  3.4     08-25  Mg     1.8     08-25    TPro  7.7  /  Alb  2.2<L>  /  TBili  4.4<H>  /  DBili  3.0<H>  /  AST  92<H>  /  ALT  110<H>  /  AlkPhos  170<H>  08-25    PT/INR - ( 23 Aug 2023 13:14 )   PT: 15.5 sec;   INR: 1.37          PTT - ( 23 Aug 2023 13:14 )  PTT:35.6 sec  Urinalysis Basic - ( 25 Aug 2023 05:20 )    Color: x / Appearance: x / SG: x / pH: x  Gluc: 117 mg/dL / Ketone: x  / Bili: x / Urobili: x   Blood: x / Protein: x / Nitrite: x   Leuk Esterase: x / RBC: x / WBC x   Sq Epi: x / Non Sq Epi: x / Bacteria: x      CAPILLARY BLOOD GLUCOSE      POCT Blood Glucose.: 98 mg/dL (25 Aug 2023 01:57)  POCT Blood Glucose.: 97 mg/dL (24 Aug 2023 22:36)  POCT Blood Glucose.: 98 mg/dL (24 Aug 2023 20:37)  POCT Blood Glucose.: 86 mg/dL (24 Aug 2023 18:38)  POCT Blood Glucose.: 168 mg/dL (24 Aug 2023 16:42)  POCT Blood Glucose.: 160 mg/dL (24 Aug 2023 16:39)  POCT Blood Glucose.: 57 mg/dL (24 Aug 2023 15:54)  POCT Blood Glucose.: 49 mg/dL (24 Aug 2023 15:52)  POCT Blood Glucose.: 77 mg/dL (24 Aug 2023 13:33)  POCT Blood Glucose.: 81 mg/dL (24 Aug 2023 11:23)  POCT Blood Glucose.: 78 mg/dL (24 Aug 2023 09:35)    LIVER FUNCTIONS - ( 25 Aug 2023 05:20 )  Alb: 2.2 g/dL / Pro: 7.7 g/dL / ALK PHOS: 170 U/L / ALT: 110 U/L / AST: 92 U/L / GGT: x             RADIOLOGY & ADDITIONAL STUDIES:

## 2023-08-26 NOTE — PROGRESS NOTE ADULT - SUBJECTIVE AND OBJECTIVE BOX
INTERVAL HPI/OVERNIGHT EVENTS:    STATUS POST:      POST OPERATIVE DAY #:     SUBJECTIVE:      amLODIPine   Tablet 10 milliGRAM(s) Oral every 24 hours  heparin   Injectable 5000 Unit(s) SubCutaneous every 8 hours  imipenem/cilastatin  IVPB 1000 milliGRAM(s) IV Intermittent every 8 hours  losartan 25 milliGRAM(s) Oral daily  metoprolol tartrate 25 milliGRAM(s) Oral every 12 hours      Vital Signs Last 24 Hrs  T(C): 36.6 (26 Aug 2023 06:26), Max: 37.1 (25 Aug 2023 19:03)  T(F): 97.8 (26 Aug 2023 06:26), Max: 98.8 (25 Aug 2023 19:03)  HR: 80 (26 Aug 2023 06:00) (77 - 82)  BP: 129/60 (26 Aug 2023 06:00) (91/55 - 146/65)  BP(mean): 87 (26 Aug 2023 06:00) (67 - 94)  RR: 16 (26 Aug 2023 06:00) (14 - 33)  SpO2: 93% (26 Aug 2023 06:00) (93% - 99%)    Parameters below as of 26 Aug 2023 06:00  Patient On (Oxygen Delivery Method): room air      I&O's Detail    24 Aug 2023 07:01  -  25 Aug 2023 07:00  --------------------------------------------------------  IN:    dextrose 10%: 1010 mL    dextrose 20%: 1050 mL    IV PiggyBack: 100 mL    IV PiggyBack: 200 mL  Total IN: 2360 mL    OUT:    Bulb (mL): 10 mL    Drain (mL): 350 mL    Drain (mL): 350 mL    VAC (Vacuum Assisted Closure) System (mL): 375 mL    Voided (mL): 1700 mL  Total OUT: 2785 mL    Total NET: -425 mL      25 Aug 2023 07:01  -  26 Aug 2023 06:48  --------------------------------------------------------  IN:    dextrose 20%: 1200 mL    IV PiggyBack: 350 mL    Oral Fluid: 60 mL    sodium chloride 3%: 570 mL  Total IN: 2180 mL    OUT:    Bulb (mL): 10 mL    Drain (mL): 400 mL    Drain (mL): 60 mL    VAC (Vacuum Assisted Closure) System (mL): 500 mL    Voided (mL): 1550 mL  Total OUT: 2520 mL    Total NET: -340 mL          General: NAD, resting comfortably in bed  C/V: NSR  Pulm: Nonlabored breathing, no respiratory distress  Abd: soft, NT/ND.  Extrem: WWP, no edema, SCDs in place  Drains:  Poole:      LABS:                        9.0    12.73 )-----------( 269      ( 26 Aug 2023 05:30 )             27.9     08-26    134<L>  |  105  |  9   ----------------------------<  99  3.8   |  23  |  0.87    Ca    8.1<L>      26 Aug 2023 05:30  Phos  2.9     08-26  Mg     2.0     08-26    TPro  7.6  /  Alb  1.9<L>  /  TBili  4.0<H>  /  DBili  2.6<H>  /  AST  69<H>  /  ALT  101<H>  /  AlkPhos  162<H>  08-26      Urinalysis Basic - ( 26 Aug 2023 05:30 )    Color: x / Appearance: x / SG: x / pH: x  Gluc: 99 mg/dL / Ketone: x  / Bili: x / Urobili: x   Blood: x / Protein: x / Nitrite: x   Leuk Esterase: x / RBC: x / WBC x   Sq Epi: x / Non Sq Epi: x / Bacteria: x        RADIOLOGY & ADDITIONAL STUDIES:   SUBJECTIVE:      amLODIPine   Tablet 10 milliGRAM(s) Oral every 24 hours  heparin   Injectable 5000 Unit(s) SubCutaneous every 8 hours  imipenem/cilastatin  IVPB 1000 milliGRAM(s) IV Intermittent every 8 hours  losartan 25 milliGRAM(s) Oral daily  metoprolol tartrate 25 milliGRAM(s) Oral every 12 hours      Vital Signs Last 24 Hrs  T(C): 36.6 (26 Aug 2023 06:26), Max: 37.1 (25 Aug 2023 19:03)  T(F): 97.8 (26 Aug 2023 06:26), Max: 98.8 (25 Aug 2023 19:03)  HR: 80 (26 Aug 2023 06:00) (77 - 82)  BP: 129/60 (26 Aug 2023 06:00) (91/55 - 146/65)  BP(mean): 87 (26 Aug 2023 06:00) (67 - 94)  RR: 16 (26 Aug 2023 06:00) (14 - 33)  SpO2: 93% (26 Aug 2023 06:00) (93% - 99%)    Parameters below as of 26 Aug 2023 06:00  Patient On (Oxygen Delivery Method): room air      I&O's Detail    24 Aug 2023 07:01  -  25 Aug 2023 07:00  --------------------------------------------------------  IN:    dextrose 10%: 1010 mL    dextrose 20%: 1050 mL    IV PiggyBack: 100 mL    IV PiggyBack: 200 mL  Total IN: 2360 mL    OUT:    Bulb (mL): 10 mL    Drain (mL): 350 mL    Drain (mL): 350 mL    VAC (Vacuum Assisted Closure) System (mL): 375 mL    Voided (mL): 1700 mL  Total OUT: 2785 mL    Total NET: -425 mL      25 Aug 2023 07:01  -  26 Aug 2023 06:48  --------------------------------------------------------  IN:    dextrose 20%: 1200 mL    IV PiggyBack: 350 mL    Oral Fluid: 60 mL    sodium chloride 3%: 570 mL  Total IN: 2180 mL    OUT:    Bulb (mL): 10 mL    Drain (mL): 400 mL    Drain (mL): 60 mL    VAC (Vacuum Assisted Closure) System (mL): 500 mL    Voided (mL): 1550 mL  Total OUT: 2520 mL    Total NET: -340 mL          General: NAD, resting comfortably in bed  C/V: NSR  Pulm: Nonlabored breathing, no respiratory distress  Abd: soft, NT/ND.  Extrem: WWP, no edema, SCDs in place  Drains:  Poole:      LABS:                        9.0    12.73 )-----------( 269      ( 26 Aug 2023 05:30 )             27.9     08-26    134<L>  |  105  |  9   ----------------------------<  99  3.8   |  23  |  0.87    Ca    8.1<L>      26 Aug 2023 05:30  Phos  2.9     08-26  Mg     2.0     08-26    TPro  7.6  /  Alb  1.9<L>  /  TBili  4.0<H>  /  DBili  2.6<H>  /  AST  69<H>  /  ALT  101<H>  /  AlkPhos  162<H>  08-26      Urinalysis Basic - ( 26 Aug 2023 05:30 )    Color: x / Appearance: x / SG: x / pH: x  Gluc: 99 mg/dL / Ketone: x  / Bili: x / Urobili: x   Blood: x / Protein: x / Nitrite: x   Leuk Esterase: x / RBC: x / WBC x   Sq Epi: x / Non Sq Epi: x / Bacteria: x        RADIOLOGY & ADDITIONAL STUDIES:   SUBJECTIVE: Patient seen and examined at bedside with chief resident. Patient feeling well with no acute complaints, pain is well controlled.      amLODIPine   Tablet 10 milliGRAM(s) Oral every 24 hours  heparin   Injectable 5000 Unit(s) SubCutaneous every 8 hours  imipenem/cilastatin  IVPB 1000 milliGRAM(s) IV Intermittent every 8 hours  losartan 25 milliGRAM(s) Oral daily  metoprolol tartrate 25 milliGRAM(s) Oral every 12 hours      Vital Signs Last 24 Hrs  T(C): 36.6 (26 Aug 2023 06:26), Max: 37.1 (25 Aug 2023 19:03)  T(F): 97.8 (26 Aug 2023 06:26), Max: 98.8 (25 Aug 2023 19:03)  HR: 80 (26 Aug 2023 06:00) (77 - 82)  BP: 129/60 (26 Aug 2023 06:00) (91/55 - 146/65)  BP(mean): 87 (26 Aug 2023 06:00) (67 - 94)  RR: 16 (26 Aug 2023 06:00) (14 - 33)  SpO2: 93% (26 Aug 2023 06:00) (93% - 99%)    Parameters below as of 26 Aug 2023 06:00  Patient On (Oxygen Delivery Method): room air      I&O's Detail    24 Aug 2023 07:01  -  25 Aug 2023 07:00  --------------------------------------------------------  IN:    dextrose 10%: 1010 mL    dextrose 20%: 1050 mL    IV PiggyBack: 100 mL    IV PiggyBack: 200 mL  Total IN: 2360 mL    OUT:    Bulb (mL): 10 mL    Drain (mL): 350 mL    Drain (mL): 350 mL    VAC (Vacuum Assisted Closure) System (mL): 375 mL    Voided (mL): 1700 mL  Total OUT: 2785 mL    Total NET: -425 mL      25 Aug 2023 07:01  -  26 Aug 2023 06:48  --------------------------------------------------------  IN:    dextrose 20%: 1200 mL    IV PiggyBack: 350 mL    Oral Fluid: 60 mL    sodium chloride 3%: 570 mL  Total IN: 2180 mL    OUT:    Bulb (mL): 10 mL    Drain (mL): 400 mL    Drain (mL): 60 mL    VAC (Vacuum Assisted Closure) System (mL): 500 mL    Voided (mL): 1550 mL  Total OUT: 2520 mL    Total NET: -340 mL          General: NAD, resting comfortably in bed  C/V: NSR  Pulm: Nonlabored breathing, no respiratory distress  Abd: soft, nondistended, wound vac in place.  Extrem: WWP, no edema, SCDs in place      LABS:                        9.0    12.73 )-----------( 269      ( 26 Aug 2023 05:30 )             27.9     08-26    134<L>  |  105  |  9   ----------------------------<  99  3.8   |  23  |  0.87    Ca    8.1<L>      26 Aug 2023 05:30  Phos  2.9     08-26  Mg     2.0     08-26    TPro  7.6  /  Alb  1.9<L>  /  TBili  4.0<H>  /  DBili  2.6<H>  /  AST  69<H>  /  ALT  101<H>  /  AlkPhos  162<H>  08-26      Urinalysis Basic - ( 26 Aug 2023 05:30 )    Color: x / Appearance: x / SG: x / pH: x  Gluc: 99 mg/dL / Ketone: x  / Bili: x / Urobili: x   Blood: x / Protein: x / Nitrite: x   Leuk Esterase: x / RBC: x / WBC x   Sq Epi: x / Non Sq Epi: x / Bacteria: x

## 2023-08-26 NOTE — PROGRESS NOTE ADULT - ASSESSMENT
78M w/ Crohn's, AFib/Flutter s/p DCCVs on amiodarone, remote ileocectomy and open appy here for SBO vs Crohns flare, s/p NGT decompression and now s/p lap TRE converted to open TRE, SBR x 3, left in discontinuity with abthera vac on 6/27, RTOR for ileocolic resection, small bowel anastomosis, and abdominal wall closure on 6/28, and s/p IR aspiration of perihepatic fluid on 7/3, stepped down to telemetery on 7/4. wound dehisence on 7/5, RTOR exlap, washout, ileocolic resection with end ileostomy, blow hole colostomy, red rubber from ileostomy to small bowel anastomosis; vicryl bridging mesh on 7/5. Transferred to SICU post op for HD monitoring. Extubated 7/6. SDU 8/2. Return to SICU 8/23 with AMS and concern for sepsis.     NEURO: AMS (resolved): hold off sedating meds, dc scopolamine patch. Cont outpatient effexor, Pain: Dilaudid for vac changes.   CV: Hx Afib/flutter: s/p DCCV, DC amiodarone given LFTs, cont Lopressor. EP on consult. Hx HTN on Norvasc, Losartan. Hx HLD: hold Lipitor given LFTs. TTE  (7/18) - PASP 64mmHg, EF 65-70%,   PULM: no resp distress on room air.   GI/FEN: EC Fistula: NPO, except meds and sips, TPN on hold due to concerns for line sepsis, low output. Abnormal LFTs: distended gallbladder on CT 8/25, pending RUQ US, DC amio and Lipitor. Octreotide on hold. Diet plan?  : Hyponatremia: improving, now off 3% saline 30cc/hr (due to free water intake vs SIADH). Voids.   ENDO: ISS. Hx hypothyroid: IV Synthroid, TSH 9 (8/23), recheck in 2-3 weeks. Hypoglycemia: D20 at 65cc/hr, monitor FBG.  ID: Sepsis (WBC and fever curve improving): concern for line sepsis given prolonged PICC from 6/30, bld cx NGTD, CTAP (8/25) without fluid collections, distended GB, atelectasis. Restart Imipenem (8/26--). //   Previously had C. tertium, Lactobacillis from his IR cx 7/3, and candida albicans, lactobacillus, vanc sensitive E faecium, vanc resistant E gallinarum, vanc resistant E casseliflavus, lactobacillus from his OR cx 7/5. Completed course of abx with imipenem (6/30-7/12, 7/23-7/30), Dapto (6/30-7/5 and 7/23-7/24). been off all abx since 7/30. empiric dapto (8/23-24) and cefepime (8/23-24).   PPX: SCDs, SQH  LINES: PIV // DC: RUE PICC (6/30-8/23), R Chest tube for iatrogenic PTX (6/27-7/3), R TLC (06/26-30), R TLC (7/5 -7/12)  WOUNDS/DRAINS: ECF abdominal wound with vac change Mon/Fri--next on 8/28.    78 M w/ Crohn's, AFib/Flutter s/p DCCVs on amiodarone, remote ileocectomy and open appendectomy. Admitted (6/23) for SBO vs Crohns flare, s/p NGT decompression and s/p lap TRE converted to open TRE, SBR x 3, left in discontinuity with abthera vac on (6/27), RTOR for ileocolic resection, small bowel anastomosis, and abdominal wall closure on (6/28), c/b fluid collection s/p IR aspiration of perihepatic fluid on (7/3), c/b wound dehiscence s/p RTOR exlap, washout, ileocolic resection with end ileostomy, blow hole colostomy, red rubber from ileostomy to small bowel anastomosis; vicryl bridging mesh on (7/5). Went to SICU but extubated 7/6 and SDU 8/2. Has been recovering on telemetry with ECF management and prolonged TPN. Returns to SICU 8/23 with AMS and concern for sepsis.     NEURO: AMS (resolved): hold off sedating meds, dc scopolamine patch. Cont outpatient effexor, Pain: Dilaudid for vac changes.   CV: Hx Afib/flutter: s/p DCCV, DC amiodarone given LFTs, cont Lopressor. EP on consult. Hx HTN on Norvasc, Losartan. Hx HLD: hold Lipitor given LFTs. TTE  (7/18) - PASP 64mmHg, EF 65-70%,   PULM: no resp distress on room air.   GI/FEN: EC Fistula: NPO, except meds and sips, TPN on hold due to concerns for line sepsis, low output. Abnormal LFTs: distended gallbladder on CT 8/25, pending RUQ US, DC amio and Lipitor. Octreotide on hold. Start CLD with ensure clears and monitor ECF output. Plan for PICC/TPN on Monday.  : Hyponatremia: improving, now off 3% saline 30cc/hr (due to free water intake vs SIADH). Voids.   ENDO: ISS. Hx hypothyroid: IV Synthroid, TSH 9 (8/23), recheck in 2-3 weeks. Hypoglycemia: D20 at 65cc/hr, monitor FBG.  ID: Sepsis (WBC and fever curve improving): concern for line sepsis given prolonged PICC from 6/30, bld cx NGTD, CTAP (8/25) without fluid collections, distended GB, atelectasis. Restart Imipenem (8/26--). //   Previously had C. tertium, Lactobacillis from his IR cx 7/3, and candida albicans, lactobacillus, vanc sensitive E faecium, vanc resistant E gallinarum, vanc resistant E casseliflavus, lactobacillus from his OR cx 7/5. Completed course of abx with imipenem (6/30-7/12, 7/23-7/30), Dapto (6/30-7/5 and 7/23-7/24). been off all abx since 7/30. empiric dapto (8/23-24) and cefepime (8/23-24).   PPX: SCDs, SQH  LINES: PIV // DC: RUE PICC (6/30-8/23), R Chest tube for iatrogenic PTX (6/27-7/3), R TLC (06/26-30), R TLC (7/5 -7/12)  WOUNDS/DRAINS: ECF abdominal wound with vac change Mon/Fri--next on 8/28.    78 M w/ Crohn's, AFib/Flutter s/p DCCVs on amiodarone, remote ileocectomy and open appendectomy. Admitted (6/23) for SBO vs Crohns flare, s/p NGT decompression and s/p lap TRE converted to open TRE, SBR x 3, left in discontinuity with abthera vac on (6/27), RTOR for ileocolic resection, small bowel anastomosis, and abdominal wall closure on (6/28), c/b fluid collection s/p IR aspiration of perihepatic fluid on (7/3), c/b wound dehiscence s/p RTOR exlap, washout, ileocolic resection with end ileostomy, blow hole colostomy, red rubber from ileostomy to small bowel anastomosis; vicryl bridging mesh on (7/5). Went to SICU but extubated 7/6 and SDU 8/2. Has been recovering on telemetry with ECF management and prolonged TPN. Returns to SICU 8/23 with AMS and concern for sepsis.     NEURO: AMS (resolved): hold off sedating meds, dc scopolamine patch. Cont outpatient effexor, Pain: Dilaudid for vac changes.   CV: Hx Afib/flutter: s/p DCCV, DC amiodarone given LFTs, cont Lopressor. EP on consult. Hx HTN on Norvasc, Losartan. Hx HLD: hold Lipitor given LFTs. TTE  (7/18) - PASP 64mmHg, EF 65-70%,   PULM: no resp distress on room air.   GI/FEN: EC Fistula: TPN on hold due to concerns for line sepsis, CLD with ensure clears and monitor ECF output, plan for PICC/TPN on Monday. Abnormal LFTs: distended gallbladder on CT 8/25, pending RUQ US, DC amio and Lipitor. Octreotide on hold.   : Hyponatremia: improving, now off 3% saline 30cc/hr (due to free water intake vs SIADH). Voids.   ENDO: ISS. Hx hypothyroid: IV Synthroid, TSH 9 (8/23), recheck in 2-3 weeks. Hypoglycemia: D20 at 65cc/hr, monitor FBG.  ID: Sepsis (WBC and fever curve improving): concern for line sepsis given prolonged PICC from 6/30, bld cx NGTD, CTAP (8/25) without fluid collections, distended GB, atelectasis. Restart Imipenem (8/26--). //   Previously had C. tertium, Lactobacillis from his IR cx 7/3, and candida albicans, lactobacillus, vanc sensitive E faecium, vanc resistant E gallinarum, vanc resistant E casseliflavus, lactobacillus from his OR cx 7/5. Completed course of abx with imipenem (6/30-7/12, 7/23-7/30), Dapto (6/30-7/5 and 7/23-7/24). been off all abx since 7/30. empiric dapto (8/23-24) and cefepime (8/23-24).   PPX: SCDs, SQH  LINES: PIV // DC: RUE PICC (6/30-8/23), R Chest tube for iatrogenic PTX (6/27-7/3), R TLC (06/26-30), R TLC (7/5 -7/12)  WOUNDS/DRAINS: ECF abdominal wound with vac change Mon/Fri--next on 8/28.

## 2023-08-26 NOTE — PROGRESS NOTE ADULT - ASSESSMENT
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 Originally concerned for in-line sepsis now s/p PICC removal. However infectious w/u unrevealing and pt without other localizing symptoms or exam findings. ID consulted for antibiotic recs.     CT chest/abdomen/pelvis without obvious source of infection    Recommend:  1. Continue Imipenem 1 gram IV q8hrs  2. Follow up blood cultures    ID team 1 will follow. I will cover the service this weekend.  Dr. Frye returns on 8/28/23

## 2023-08-26 NOTE — PROGRESS NOTE ADULT - SUBJECTIVE AND OBJECTIVE BOX
INTERVAL HPI/OVERNIGHT EVENTS:    Coverage for Dr. Frye (Team 1)    Patient was seen and examined at bedside.  Afebrile and not on pressors.      CONSTITUTIONAL:  Negative fever or chills, feels well, good appetite  EYES:  Negative  blurry vision or double vision  CARDIOVASCULAR:  Negative for chest pain or palpitations  RESPIRATORY:  Negative for cough, wheezing, or SOB   GASTROINTESTINAL:  Negative for nausea, vomiting, diarrhea, constipation, or abdominal pain  GENITOURINARY:  Negative frequency, urgency or dysuria  NEUROLOGIC:  No headache, confusion, dizziness, lightheadedness      ANTIBIOTICS/RELEVANT:    MEDICATIONS  (STANDING):  amLODIPine   Tablet 10 milliGRAM(s) Oral every 24 hours  chlorhexidine 2% Cloths 1 Application(s) Topical <User Schedule>  chlorhexidine 2% Cloths 1 Application(s) Topical <User Schedule>  cholestyramine Powder (Sugar-Free) 4 Gram(s) Oral two times a day  dextrose 20%. 500 milliLiter(s) (65 mL/Hr) IV Continuous <Continuous>  dextrose 5%. 1000 milliLiter(s) (50 mL/Hr) IV Continuous <Continuous>  dextrose 50% Injectable 25 Gram(s) IV Push once  dextrose 50% Injectable 25 Gram(s) IV Push once  glucagon  Injectable 1 milliGRAM(s) IntraMuscular once  glucagon  Injectable 1 milliGRAM(s) IntraMuscular once  heparin   Injectable 5000 Unit(s) SubCutaneous every 8 hours  imipenem/cilastatin  IVPB 1000 milliGRAM(s) IV Intermittent every 8 hours  insulin lispro (ADMELOG) corrective regimen sliding scale   SubCutaneous every 6 hours  levothyroxine Injectable 40 MICROGram(s) IV Push <User Schedule>  loperamide 4 milliGRAM(s) Oral every 12 hours  losartan 25 milliGRAM(s) Oral daily  metoprolol tartrate 25 milliGRAM(s) Oral every 12 hours  nystatin Powder 1 Application(s) Topical three times a day  pantoprazole  Injectable 40 milliGRAM(s) IV Push daily  venlafaxine XR. 300 milliGRAM(s) Oral daily    MEDICATIONS  (PRN):  benzocaine/menthol Lozenge 2 Lozenge Oral every 4 hours PRN Sore Throat  dextrose Oral Gel 15 Gram(s) Oral once PRN Blood Glucose LESS THAN 70 milliGRAM(s)/deciliter  melatonin 5 milliGRAM(s) Oral at bedtime PRN Sleep        Vital Signs Last 24 Hrs  T(C): 36.8 (26 Aug 2023 09:10), Max: 37.1 (25 Aug 2023 19:03)  T(F): 98.2 (26 Aug 2023 09:10), Max: 98.8 (25 Aug 2023 19:03)  HR: 78 (26 Aug 2023 13:00) (77 - 82)  BP: 148/65 (26 Aug 2023 13:00) (106/53 - 168/98)  BP(mean): 94 (26 Aug 2023 13:00) (74 - 121)  RR: 16 (26 Aug 2023 13:00) (14 - 33)  SpO2: 99% (26 Aug 2023 13:00) (93% - 100%)    Parameters below as of 26 Aug 2023 13:00  Patient On (Oxygen Delivery Method): room air        PHYSICAL EXAM:  Constitutional: non-toxic, no distress  Eyes:PATRICIA, EOMI  Ear/Nose/Throat: no oral lesion, no sinus tenderness on percussion	  Neck:  supple  Respiratory: CTA jesus  Cardiovascular: S1S2 RRR, no murmurs  Gastrointestinal:soft, (+) BS, no HSM  Extremities:no e/e/c  Vascular: DP Pulse:	right normal; left normal      LABS:                        9.0    12.73 )-----------( 269      ( 26 Aug 2023 05:30 )             27.9     08-26    134<L>  |  105  |  9   ----------------------------<  99  3.8   |  23  |  0.87    Ca    8.1<L>      26 Aug 2023 05:30  Phos  2.9     08-26  Mg     2.0     08-26    TPro  7.6  /  Alb  1.9<L>  /  TBili  4.0<H>  /  DBili  2.6<H>  /  AST  69<H>  /  ALT  101<H>  /  AlkPhos  162<H>  08-26      Urinalysis Basic - ( 26 Aug 2023 05:30 )    Color: x / Appearance: x / SG: x / pH: x  Gluc: 99 mg/dL / Ketone: x  / Bili: x / Urobili: x   Blood: x / Protein: x / Nitrite: x   Leuk Esterase: x / RBC: x / WBC x   Sq Epi: x / Non Sq Epi: x / Bacteria: x        MICROBIOLOGY:    Culture - Blood (08.24.23 @ 21:14)    Specimen Source: .Blood Blood-Peripheral   Culture Results:   No growth at 1 day.    Culture - Blood (08.24.23 @ 21:14)    Specimen Source: .Blood Blood-Peripheral   Culture Results:   No growth at 1 day.      RADIOLOGY & ADDITIONAL STUDIES:    < from: CT Chest w/ IV Cont (08.25.23 @ 15:16) >  Increased small right pleural effusion and adjacent atelectasis, cannot   exclude superimposed pneumonia.  No pneumoperitoneum, intra-abdominal extravasation of administered   enteric contrast material, or organized fluid collection to suggest   abscess.   Additional incidental findings described above.    < end of copied text >

## 2023-08-26 NOTE — PROGRESS NOTE ADULT - NS ATTEND AMEND GEN_ALL_CORE FT
Crohn's, AF, HTN s/p SBR, ileocolic resection, blowhole colostomy, end ileostomy  physical as above  continue empiric imipenem  no further fever and WBC lower  CT with no collection to drain  follow LFT off amiodarone and statin  continue metoprolol  continue D20  Na improved  PICC Monday if no evidence of bacteremia  rest as above  decision making of high complexity

## 2023-08-26 NOTE — PROGRESS NOTE ADULT - SUBJECTIVE AND OBJECTIVE BOX
Covering for Dr. Peterson.    Looks and feels well.  Conversant. No complaints.  AFVSS  Abd soft, ND.  VAC in place.  Outputs from drains is low.  Ileostomy flush, minimal output    Hypoglycemia being treated with D20.   Mild leukocytosis  Blood cultures NGTD    A/P: Entero-atmospheric fistula.  Resolved sepsis, possibly due to line infection.   1. On abx  2. Off TPN for now.    3. Clear liquids and Ensure  4. Drain and VAC outputs might increase with po intake, but as long as output is controlled can continue.  5. OOB, IS

## 2023-08-26 NOTE — PROGRESS NOTE ADULT - SUBJECTIVE AND OBJECTIVE BOX
INTERVAL/OVERNIGHT EVENTS:    SUBJECTIVE:     POD #  SICU Day #    Neurologic Medications  melatonin 5 milliGRAM(s) Oral at bedtime PRN Sleep  venlafaxine XR. 300 milliGRAM(s) Oral daily    Respiratory Medications    Cardiovascular Medications  amLODIPine   Tablet 10 milliGRAM(s) Oral every 24 hours  losartan 25 milliGRAM(s) Oral daily  metoprolol tartrate 25 milliGRAM(s) Oral every 12 hours    Gastrointestinal Medications  dextrose 20%. 500 milliLiter(s) IV Continuous <Continuous>  dextrose 5%. 1000 milliLiter(s) IV Continuous <Continuous>  loperamide 4 milliGRAM(s) Oral every 12 hours  pantoprazole  Injectable 40 milliGRAM(s) IV Push daily    Genitourinary Medications    Hematologic/Oncologic Medications  heparin   Injectable 5000 Unit(s) SubCutaneous every 8 hours    Antimicrobial/Immunologic Medications  imipenem/cilastatin  IVPB 1000 milliGRAM(s) IV Intermittent every 8 hours    Endocrine/Metabolic Medications  cholestyramine Powder (Sugar-Free) 4 Gram(s) Oral two times a day  dextrose 50% Injectable 25 Gram(s) IV Push once  dextrose 50% Injectable 25 Gram(s) IV Push once  dextrose Oral Gel 15 Gram(s) Oral once PRN Blood Glucose LESS THAN 70 milliGRAM(s)/deciliter  glucagon  Injectable 1 milliGRAM(s) IntraMuscular once  glucagon  Injectable 1 milliGRAM(s) IntraMuscular once  insulin lispro (ADMELOG) corrective regimen sliding scale   SubCutaneous every 6 hours  levothyroxine Injectable 40 MICROGram(s) IV Push <User Schedule>    Topical/Other Medications  benzocaine/menthol Lozenge 2 Lozenge Oral every 4 hours PRN Sore Throat  chlorhexidine 2% Cloths 1 Application(s) Topical <User Schedule>  chlorhexidine 2% Cloths 1 Application(s) Topical <User Schedule>  nystatin Powder 1 Application(s) Topical three times a day      MEDICATIONS  (PRN):  benzocaine/menthol Lozenge 2 Lozenge Oral every 4 hours PRN Sore Throat  dextrose Oral Gel 15 Gram(s) Oral once PRN Blood Glucose LESS THAN 70 milliGRAM(s)/deciliter  melatonin 5 milliGRAM(s) Oral at bedtime PRN Sleep      I&O's Detail    25 Aug 2023 07:01  -  26 Aug 2023 07:00  --------------------------------------------------------  IN:    dextrose 20%: 1330 mL    IV PiggyBack: 350 mL    Oral Fluid: 60 mL    sodium chloride 3%: 600 mL  Total IN: 2340 mL    OUT:    Bulb (mL): 10 mL    Drain (mL): 210 mL    Drain (mL): 475 mL    VAC (Vacuum Assisted Closure) System (mL): 580 mL    Voided (mL): 1800 mL  Total OUT: 3075 mL    Total NET: -735 mL          Vital Signs Last 24 Hrs  T(C): 36.6 (26 Aug 2023 06:26), Max: 37.1 (25 Aug 2023 19:03)  T(F): 97.8 (26 Aug 2023 06:26), Max: 98.8 (25 Aug 2023 19:03)  HR: 78 (26 Aug 2023 08:00) (77 - 82)  BP: 168/98 (26 Aug 2023 07:00) (91/55 - 168/98)  BP(mean): 121 (26 Aug 2023 07:00) (67 - 121)  RR: 21 (26 Aug 2023 08:00) (14 - 33)  SpO2: 96% (26 Aug 2023 08:00) (93% - 99%)    Parameters below as of 26 Aug 2023 08:00  Patient On (Oxygen Delivery Method): room air        GENERAL: NAD, resting comfortably in bed  HEENT: NCAT, MMM  C/V: Normal rate, normal peripheral perfusion  PULM: Nonlabored breathing, no respiratory distress,   ABD: Soft, ND, NT, no rebound tenderness, no guarding  EXTREM: WWP, no edema, SCDs in place  NEURO: No focal deficits    LABS:                        9.0    12.73 )-----------( 269      ( 26 Aug 2023 05:30 )             27.9     08-26    134<L>  |  105  |  9   ----------------------------<  99  3.8   |  23  |  0.87    Ca    8.1<L>      26 Aug 2023 05:30  Phos  2.9     08-26  Mg     2.0     08-26    TPro  7.6  /  Alb  1.9<L>  /  TBili  4.0<H>  /  DBili  2.6<H>  /  AST  69<H>  /  ALT  101<H>  /  AlkPhos  162<H>  08-26      Urinalysis Basic - ( 26 Aug 2023 05:30 )    Color: x / Appearance: x / SG: x / pH: x  Gluc: 99 mg/dL / Ketone: x  / Bili: x / Urobili: x   Blood: x / Protein: x / Nitrite: x   Leuk Esterase: x / RBC: x / WBC x   Sq Epi: x / Non Sq Epi: x / Bacteria: x        RADIOLOGY & ADDITIONAL STUDIES:  CT Abdomen and Pelvis w/ Oral Cont and w/ IV Cont:   ACC: [id number] EXAM:  CT CHEST IC   ORDERED BY: ELSA WEBB     ACC: [id number] EXAM:  CT ABDOMEN AND PELVIS OC IC   ORDERED BY: ELSA WEBB     PROCEDURE DATE:  08/25/2023          INTERPRETATION:  CLINICAL INFORMATION: Crohn's disease, ileocolic   resection with end ileostomy. Sepsis.    COMPARISON: None CT chest abdomen pelvis July 23, 2023. CT chest abdomen   pelvis April 5, 2022.    CONTRAST/COMPLICATIONS:  IV Contrast: Isovue 370  93 cc administered (accession [id number]), 0 cc   administered (accession [id number])   7 cc discarded (accession [id number]),   0 cc discarded (accession [id number])  Oral Contrast: Omnipaque 350  Complications: None reported at time of study completion    PROCEDURE:  CT of the Chest, Abdomen and Pelvis was performed.  Sagittal and coronal reformats were performed.    FINDINGS:  CHEST:  LUNGS AND LARGE AIRWAYS: Patent central airways. Increased partial   consolidation of posterior basilar right lower lobe. Slightly improved   consolidation posterior left lower lobe.  PLEURA: Increased small right pleural effusion. Decreased trace residual   left pleural effusion.  VESSELS: Coronary artery and aortic valvular calcifications. No pulmonary   artery filling defects seen  HEART: Heart size is normal. No pericardial effusion.  MEDIASTINUM AND NATALIA: Mild reactive enlargement of mediastinal and hilar   lymph nodes.  CHEST WALL AND LOWER NECK: Within normal limits.    ABDOMEN AND PELVIS:  LIVER: Few cysts.  BILE DUCTS: Normal caliber.  GALLBLADDER: Persistent dilated fluid-filled gallbladder measuring up to   6 cm. No wall thickening or pericholecystic inflammatory changes. Stable   small volume layering sludge.  SPLEEN: Within normal limits. No evidence of infarct, previously reported   asymmetric enhancement favored transient altered perfusion due to phase   of contrast timing.  PANCREAS: Stable mildly dilated main pancreatic duct 4 mm throughout   body, dilated side branches at tail, relative transition point at level   of cystic lesion measuring1.7 cm centered at level of neck, not dilated   at uncinate process or head, present dating back to at least April 5, 2022 upon directed review.  ADRENALS: Within normal limits.  KIDNEYS/URETERS: No hydronephrosis. Bilateral renal cysts..    BLADDER: Within normal limits.  REPRODUCTIVE ORGANS: Normal sized prostate.    BOWEL: Postsurgical changes of right hemicolectomy and multiple small   bowel resections. No bowel obstruction.  PERITONEUM: No pneumoperitoneum. No intra-abdominal contrast   extravasation. No organized rim-enhancing fluid collection. Trace   scattered unorganized free fluid. Decreased diffuse mesenteric   stranding/edema.  VESSELS: Mild aortic atherosclerotic changes..  RETROPERITONEUM/LYMPH NODES: No lymphadenopathy.  ABDOMINAL WALL: No significant change midline wound and postsurgical   changes. Redemonstrated extravasation of oral administered contrast   material along the ventral abdominal wound with overlying wound vac.   Fat-containing left groin hernia.  BONES: Degenerative changes.    IMPRESSION:  Increased small right pleural effusion and adjacent atelectasis, cannot   exclude superimposed pneumonia.  No pneumoperitoneum, intra-abdominal extravasation of administered   enteric contrast material, or organized fluid collection to suggest   abscess.   Additional incidental findings described above.        --- End of Report ---            YVAN FOX MD; Attending Radiologist  This document has been electronically signed. Aug 25 2023  5:04PM (08-25-23 @ 15:15)      Culture - Blood (collected 08-24-23 @ 21:14)  Source: .Blood Blood-Peripheral  Preliminary Report (08-25-23 @ 22:00):    No growth at 1 day.    Culture - Blood (collected 08-24-23 @ 21:14)  Source: .Blood Blood-Peripheral  Preliminary Report (08-25-23 @ 22:00):    No growth at 1 day.    Urinalysis with Rflx Culture (collected 08-24-23 @ 03:17)    Culture - Blood (collected 08-23-23 @ 13:51)  Source: .Blood Blood-Catheter  Preliminary Report (08-25-23 @ 15:00):    No growth at 2 days.    Culture - Blood (collected 08-23-23 @ 13:38)  Source: .Blood Blood-Peripheral  Preliminary Report (08-25-23 @ 15:00):    No growth at 2 days.    Culture - Blood (collected 08-23-23 @ 13:38)  Source: .Blood Blood-Peripheral  Preliminary Report (08-25-23 @ 15:00):    No growth at 2 days.     INTERVAL/OVERNIGHT EVENTS: Hypoglycemic to 70s--gave amp of dextrose and increased D20 to 65cc/hr. Sodium improved to 134 so 3% NS at 30cc was discontinued.     SUBJECTIVE: Seen this AM and doing well without abd pain. No N/V or pain with water or sips of clear liquid diet. No issues voiding. Vac was changed yesterday.     Neurologic Medications  melatonin 5 milliGRAM(s) Oral at bedtime PRN Sleep  venlafaxine XR. 300 milliGRAM(s) Oral daily    Cardiovascular Medications  amLODIPine   Tablet 10 milliGRAM(s) Oral every 24 hours  losartan 25 milliGRAM(s) Oral daily  metoprolol tartrate 25 milliGRAM(s) Oral every 12 hours    Gastrointestinal Medications  loperamide 4 milliGRAM(s) Oral every 12 hours  pantoprazole  Injectable 40 milliGRAM(s) IV Push daily    Hematologic/Oncologic Medications  heparin   Injectable 5000 Unit(s) SubCutaneous every 8 hours    Antimicrobial/Immunologic Medications  imipenem/cilastatin  IVPB 1000 milliGRAM(s) IV Intermittent every 8 hours    Endocrine/Metabolic Medications  cholestyramine Powder (Sugar-Free) 4 Gram(s) Oral two times a day  glucagon  Injectable 1 milliGRAM(s) IntraMuscular once  insulin lispro (ADMELOG) corrective regimen sliding scale   SubCutaneous every 6 hours  levothyroxine Injectable 40 MICROGram(s) IV Push <User Schedule>    Topical/Other Medications  benzocaine/menthol Lozenge 2 Lozenge Oral every 4 hours PRN Sore Throat    melatonin 5 milliGRAM(s) Oral at bedtime PRN Sleep    I&O's Detail    25 Aug 2023 07:01  -  26 Aug 2023 07:00  --------------------------------------------------------  IN:    dextrose 20%: 1330 mL    IV PiggyBack: 350 mL    Oral Fluid: 60 mL    sodium chloride 3%: 600 mL  Total IN: 2340 mL    OUT:    Bulb (mL): 10 mL    Drain (mL): 210 mL    Drain (mL): 475 mL    VAC (Vacuum Assisted Closure) System (mL): 580 mL    Voided (mL): 1800 mL  Total OUT: 3075 mL    Total NET: -735 mL    Vital Signs Last 24 Hrs  T(C): 36.6 (26 Aug 2023 06:26), Max: 37.1 (25 Aug 2023 19:03)  T(F): 97.8 (26 Aug 2023 06:26), Max: 98.8 (25 Aug 2023 19:03)  HR: 78 (26 Aug 2023 08:00) (77 - 82)  BP: 168/98 (26 Aug 2023 07:00) (91/55 - 168/98)  BP(mean): 121 (26 Aug 2023 07:00) (67 - 121)  RR: 21 (26 Aug 2023 08:00) (14 - 33)  SpO2: 96% (26 Aug 2023 08:00) (93% - 99%)    Parameters below as of 26 Aug 2023 08:00  Patient On (Oxygen Delivery Method): room air    GENERAL: NAD, resting comfortably in bed, AxOx3, follows commands.   HEENT: NCAT, MMM  C/V: Normal rate, normal peripheral perfusion, no murmurs  PULM: Nonlabored breathing, no respiratory distress, CTA b/l  ABD: Soft, ND, NT, no rebound tenderness, no guarding, vac in place holding suction with well-sealed pouching and drains putting out serobilious output.   EXTREM: WWP, no edema, SCDs in place  NEURO: No focal deficits    LABS:                        9.0    12.73 )-----------( 269      ( 26 Aug 2023 05:30 )             27.9     08-26    134<L>  |  105  |  9   ----------------------------<  99  3.8   |  23  |  0.87    Ca    8.1<L>      26 Aug 2023 05:30  Phos  2.9     08-26  Mg     2.0     08-26    TPro  7.6  /  Alb  1.9<L>  /  TBili  4.0<H>  /  DBili  2.6<H>  /  AST  69<H>  /  ALT  101<H>  /  AlkPhos  162<H>  08-26    Urinalysis Basic - ( 26 Aug 2023 05:30 )    Color: x / Appearance: x / SG: x / pH: x  Gluc: 99 mg/dL / Ketone: x  / Bili: x / Urobili: x   Blood: x / Protein: x / Nitrite: x   Leuk Esterase: x / RBC: x / WBC x   Sq Epi: x / Non Sq Epi: x / Bacteria: x    RADIOLOGY & ADDITIONAL STUDIES:  CT Abdomen and Pelvis w/ Oral Cont and w/ IV Cont:   ACC: [id number] EXAM:  CT CHEST IC   ORDERED BY: ELSA WEBB     ACC: [id number] EXAM:  CT ABDOMEN AND PELVIS OC IC   ORDERED BY: ELSA WEBB     PROCEDURE DATE:  08/25/2023      INTERPRETATION:  CLINICAL INFORMATION: Crohn's disease, ileocolic   resection with end ileostomy. Sepsis.    COMPARISON: None CT chest abdomen pelvis July 23, 2023. CT chest abdomen   pelvis April 5, 2022.    CONTRAST/COMPLICATIONS:  IV Contrast: Isovue 370  93 cc administered (accession [id number]), 0 cc   administered (accession [id number])   7 cc discarded (accession [id number]),   0 cc discarded (accession [id number])  Oral Contrast: Omnipaque 350  Complications: None reported at time of study completion    PROCEDURE:  CT of the Chest, Abdomen and Pelvis was performed.  Sagittal and coronal reformats were performed.    FINDINGS:  CHEST:  LUNGS AND LARGE AIRWAYS: Patent central airways. Increased partial   consolidation of posterior basilar right lower lobe. Slightly improved   consolidation posterior left lower lobe.  PLEURA: Increased small right pleural effusion. Decreased trace residual   left pleural effusion.  VESSELS: Coronary artery and aortic valvular calcifications. No pulmonary   artery filling defects seen  HEART: Heart size is normal. No pericardial effusion.  MEDIASTINUM AND NATALIA: Mild reactive enlargement of mediastinal and hilar   lymph nodes.  CHEST WALL AND LOWER NECK: Within normal limits.    ABDOMEN AND PELVIS:  LIVER: Few cysts.  BILE DUCTS: Normal caliber.  GALLBLADDER: Persistent dilated fluid-filled gallbladder measuring up to   6 cm. No wall thickening or pericholecystic inflammatory changes. Stable   small volume layering sludge.  SPLEEN: Within normal limits. No evidence of infarct, previously reported   asymmetric enhancement favored transient altered perfusion due to phase   of contrast timing.  PANCREAS: Stable mildly dilated main pancreatic duct 4 mm throughout   body, dilated side branches at tail, relative transition point at level   of cystic lesion measuring1.7 cm centered at level of neck, not dilated   at uncinate process or head, present dating back to at least April 5, 2022 upon directed review.  ADRENALS: Within normal limits.  KIDNEYS/URETERS: No hydronephrosis. Bilateral renal cysts..    BLADDER: Within normal limits.  REPRODUCTIVE ORGANS: Normal sized prostate.    BOWEL: Postsurgical changes of right hemicolectomy and multiple small   bowel resections. No bowel obstruction.  PERITONEUM: No pneumoperitoneum. No intra-abdominal contrast   extravasation. No organized rim-enhancing fluid collection. Trace   scattered unorganized free fluid. Decreased diffuse mesenteric   stranding/edema.  VESSELS: Mild aortic atherosclerotic changes..  RETROPERITONEUM/LYMPH NODES: No lymphadenopathy.  ABDOMINAL WALL: No significant change midline wound and postsurgical   changes. Redemonstrated extravasation of oral administered contrast   material along the ventral abdominal wound with overlying wound vac.   Fat-containing left groin hernia.  BONES: Degenerative changes.    IMPRESSION:  Increased small right pleural effusion and adjacent atelectasis, cannot   exclude superimposed pneumonia.  No pneumoperitoneum, intra-abdominal extravasation of administered   enteric contrast material, or organized fluid collection to suggest   abscess.   Additional incidental findings described above.    YVAN FOX MD; Attending Radiologist  This document has been electronically signed. Aug 25 2023  5:04PM (08-25-23 @ 15:15)    Culture - Blood (collected 08-24-23 @ 21:14)  Source: .Blood Blood-Peripheral  Preliminary Report (08-25-23 @ 22:00):    No growth at 1 day.    Culture - Blood (collected 08-24-23 @ 21:14)  Source: .Blood Blood-Peripheral  Preliminary Report (08-25-23 @ 22:00):    No growth at 1 day.    Urinalysis with Rflx Culture (collected 08-24-23 @ 03:17)    Culture - Blood (collected 08-23-23 @ 13:51)  Source: .Blood Blood-Catheter  Preliminary Report (08-25-23 @ 15:00):    No growth at 2 days.    Culture - Blood (collected 08-23-23 @ 13:38)  Source: .Blood Blood-Peripheral  Preliminary Report (08-25-23 @ 15:00):    No growth at 2 days.    Culture - Blood (collected 08-23-23 @ 13:38)  Source: .Blood Blood-Peripheral  Preliminary Report (08-25-23 @ 15:00):    No growth at 2 days.

## 2023-08-26 NOTE — PROGRESS NOTE ADULT - ASSESSMENT
7/3, stepped down to telemetry on 7/4. wound dehiscence on 7/5, RTOR ex-lap, washout, ileocolic resection with end ileostomy, blow hole colostomy, red rubber from ileostomy to small bowel anastomosis; Vicryl bridging mesh on 7/5. Transferred to SICU post op for HD monitoring. Extubated 7/6. Surgical wound with decreasing in size and granulating with Vac. Tentative plan for skin graft per Plastics; timing TBD pending proper wound shrinkage and granulation   8/23: Upgraded to SICU 8/23 for acute delirium and tremors, r/o sepsis vs metabolic encephalopathy. Ammonia levels normal    8/25: Spiked temp to 101.3 overnight w/ new leukocytosis to 14     Plan:   - Continue IV Abx   - Wound vac change Monday & Friday   - Continue Loperamide; monitor fistula drain and wound manager output   - PRN pain and nausea control   - Hx of A-fib/flutter; continue rate and rhythm control   - Encourage IS/OOB   - DVT ppx   - PT   - Tentative plan for skin graft per Plastics; timing TBD pending proper wound granulation   - Appreciate SICU care

## 2023-08-26 NOTE — PROGRESS NOTE ADULT - ASSESSMENT
Clinically stable Off amiodorone per EP Will need PICC line and resumption of TPN  OOB today/pulm PT

## 2023-08-27 NOTE — PROGRESS NOTE ADULT - ASSESSMENT
78 M w/ Crohn's, AFib/Flutter s/p DCCVs on amiodarone, remote ileocectomy and open appendectomy. Admitted (6/23) for SBO vs Crohns flare, s/p NGT decompression and s/p lap TRE converted to open TRE, SBR x 3, left in discontinuity with abthera vac on (6/27), RTOR for ileocolic resection, small bowel anastomosis, and abdominal wall closure on (6/28), c/b fluid collection s/p IR aspiration of perihepatic fluid on (7/3), c/b wound dehiscence s/p RTOR exlap, washout, ileocolic resection with end ileostomy, blow hole colostomy, red rubber from ileostomy to small bowel anastomosis; vicryl bridging mesh on (7/5). Went to SICU but extubated 7/6 and SDU 8/2. Has been recovering with ECF management and prolonged TPN. Returned to SICU 8/23 with AMS and concern for sepsis, blood culture, picc line culture with no growth so far.      NEURO: hold off sedating meds, dc scopolamine patch. Cont outpatient effexor, Pain: Dilaudid for vac changes.   CV: Hx Afib/flutter: s/p DCCV, DC amiodarone given LFTs, cont Lopressor. EP on consult. Hx HTN on Norvasc, Losartan. Hx HLD: hold Lipitor given LFTs. TTE  (7/18) - PASP 64mmHg, EF 65-70%,   PULM: no resp distress on room air.   GI/FEN: EC Fistula: TPN on hold, no line. CLD with ensure clears and monitor ECF output, plan for PICC/TPN on Monday 8/28/23. Abnormal LFTs: distended gallbladder on CT 8/25, RUQ US 8/26/23: Distended gallbladder containing sludge, nonspecific. No wall thickening or pericholecystic fluid and negative sonographic Capellan sign. CBD 6 mm. DC amio and Lipitor. Octreotide on hold.   : Hyponatremia: improving, now off 3% saline 30cc/hr (due to free water intake vs SIADH). Voiding.    ENDO: ISS. Hx hypothyroid: IV Synthroid, TSH 9 (8/23), recheck in 2-3 weeks. Hypoglycemia: D20 at 65cc/hr, monitor FBG.  ID: Sepsis? resolved, Afebrile for the past 48 h, WBC 10.6negative blood cultures. CTAP (8/25) without fluid collections, distended GB, atelectasis. Restarted Imipenem (8/26--)  Previously had C. tertium, Lactobacillis from his IR cx 7/3, and candida albicans, lactobacillus, vanc sensitive E faecium, vanc resistant E gallinarum, vanc resistant E casseliflavus, lactobacillus from his OR cx 7/5. Completed course of abx with imipenem (6/30-7/12, 7/23-7/30), Dapto (6/30-7/5 and 7/23-7/24). Was off all abx since 7/30 until 8/23. Empiric dapto (8/23-24) and cefepime (8/23-24).   PPX: SCDs, SQH  LINES: PICC line for TPN to be placed on 8/28/23 PIV // DC: RUE PICC (6/30-8/23), R Chest tube for iatrogenic PTX (6/27-7/3), R TLC (06/26-30), R TLC (7/5 -7/12)  WOUNDS/DRAINS: ECF abdominal wound with vac change Mon/Fri--next on 8/28.

## 2023-08-27 NOTE — PROGRESS NOTE ADULT - ASSESSMENT
78 M w/ Crohn's, AFib/Flutter s/p DCCVs on amiodarone, remote ileocectomy and open appendectomy. Admitted (6/23) for SBO vs Crohns flare, s/p NGT decompression and s/p lap TRE converted to open TRE, SBR x 3, left in discontinuity with abthera vac on (6/27), RTOR for ileocolic resection, small bowel anastomosis, and abdominal wall closure on (6/28), c/b fluid collection s/p IR aspiration of perihepatic fluid on (7/3), c/b wound dehiscence s/p RTOR exlap, washout, ileocolic resection with end ileostomy, blow hole colostomy, red rubber from ileostomy to small bowel anastomosis; vicryl bridging mesh on (7/5). Went to SICU but extubated 7/6 and SDU 8/2. Has been recovering on telemetry with ECF management and prolonged TPN. Returns to SICU 8/23 with AMS and concern for sepsis.     Recs:   Continued excellent care per SICU  TPN   Follow drain/ostomy outputs.   Please reach out to team 1 for any questions

## 2023-08-27 NOTE — PROGRESS NOTE ADULT - SUBJECTIVE AND OBJECTIVE BOX
Patient evaluated with chief resident during AM rounds    STATUS POST:  Exploratory laparotomy    Laparoscopic lysis of intestinal adhesions    Exploratory laparotomy    Open lysis of intestinal adhesions    Resection of small bowel    Temporary closure of abdominal cavity    Repair, anastomosis, ileocolic    Small bowel resection with anastomosis    Flap, myocutaneous, abdominal wall    Reconstruction of abdominal wall using mesh    Washout of abdominal cavity    Creation of ileostomy    Creation of colostomy    Laparoscopic lysis of intestinal adhesions    Open lysis of intestinal adhesions    Resection of small bowel    Temporary closure of abdominal cavity    Repair, anastomosis, ileocolic    Small bowel resection with anastomosis    Flap, myocutaneous, abdominal wall    Reconstruction of abdominal wall using mesh    Washout of abdominal cavity        POST OPERATIVE DAY #:     Overnight Events:  SUBJECTIVE:     Denies Chest Pain, Difficulty breathing, Calf Pain, Headache, Dizziness    OBJECTIVE:  Vital Signs Last 24 Hrs  T(C): 36.7 (27 Aug 2023 07:00), Max: 37.1 (26 Aug 2023 14:10)  T(F): 98 (27 Aug 2023 07:00), Max: 98.7 (26 Aug 2023 14:10)  HR: 80 (27 Aug 2023 06:00) (78 - 86)  BP: 129/62 (27 Aug 2023 06:00) (113/58 - 168/98)  BP(mean): 89 (27 Aug 2023 06:00) (77 - 121)  RR: 16 (27 Aug 2023 06:00) (14 - 29)  SpO2: 100% (27 Aug 2023 06:00) (95% - 100%)    Parameters below as of 27 Aug 2023 06:00  Patient On (Oxygen Delivery Method): room air        I&O's Summary    25 Aug 2023 07:01  -  26 Aug 2023 07:00  --------------------------------------------------------  IN: 2590 mL / OUT: 3075 mL / NET: -485 mL    26 Aug 2023 07:01  -  27 Aug 2023 06:14  --------------------------------------------------------  IN: 2820 mL / OUT: 3240 mL / NET: -420 mL        Physical Exam:  General Appearance: Appears well, NAD  Pulmonary: Nonlabored breathing, no respiratory distress  Cardiovascular: NSR  Abdomen: Soft, nondistneded, appropriate incisional tenderness, dressings clean and dry and intact  Extremities: WWP, SCD's in place     LABS:                        9.2    10.62 )-----------( 282      ( 27 Aug 2023 05:30 )             28.5     08-27    x   |  102  |  9   ----------------------------<  111<H>  3.8   |  24  |  0.96    Ca    8.4      27 Aug 2023 05:30  Phos  3.4     08-27  Mg     1.7     08-27    TPro  7.6  /  Alb  1.9<L>  /  TBili  4.0<H>  /  DBili  2.6<H>  /  AST  69<H>  /  ALT  101<H>  /  AlkPhos  162<H>  08-26      Urinalysis Basic - ( 27 Aug 2023 05:30 )    Color: x / Appearance: x / SG: x / pH: x  Gluc: 111 mg/dL / Ketone: x  / Bili: x / Urobili: x   Blood: x / Protein: x / Nitrite: x   Leuk Esterase: x / RBC: x / WBC x   Sq Epi: x / Non Sq Epi: x / Bacteria: x       Patient evaluated with chief resident during AM rounds    STATUS POST:  Exploratory laparotomy    Laparoscopic lysis of intestinal adhesions    Exploratory laparotomy    Open lysis of intestinal adhesions    Resection of small bowel    Temporary closure of abdominal cavity    Repair, anastomosis, ileocolic    Small bowel resection with anastomosis    Flap, myocutaneous, abdominal wall    Reconstruction of abdominal wall using mesh    Washout of abdominal cavity    Creation of ileostomy    Creation of colostomy    Laparoscopic lysis of intestinal adhesions    Open lysis of intestinal adhesions    Resection of small bowel    Temporary closure of abdominal cavity    Repair, anastomosis, ileocolic    Small bowel resection with anastomosis    Flap, myocutaneous, abdominal wall    Reconstruction of abdominal wall using mesh    Washout of abdominal cavity        Overnight Events: [place] CLD. PICC Cx and BCx NG @ 3 days. RUQ US showing distended gb containing sludge, nonspecific. No wall thickening or pericholecystic fluid. BCx NG @2 days. ECF w/ 975cc/1125cc. Repleted.   SUBJECTIVE: This am patient appearing well on AM rounds. Talking AAOx4. -N/-V.     Denies Chest Pain, Difficulty breathing, Calf Pain, Headache, Dizziness    OBJECTIVE:  Vital Signs Last 24 Hrs  T(C): 36.7 (27 Aug 2023 07:00), Max: 37.1 (26 Aug 2023 14:10)  T(F): 98 (27 Aug 2023 07:00), Max: 98.7 (26 Aug 2023 14:10)  HR: 80 (27 Aug 2023 06:00) (78 - 86)  BP: 129/62 (27 Aug 2023 06:00) (113/58 - 168/98)  BP(mean): 89 (27 Aug 2023 06:00) (77 - 121)  RR: 16 (27 Aug 2023 06:00) (14 - 29)  SpO2: 100% (27 Aug 2023 06:00) (95% - 100%)    Parameters below as of 27 Aug 2023 06:00  Patient On (Oxygen Delivery Method): room air        I&O's Summary    25 Aug 2023 07:01  -  26 Aug 2023 07:00  --------------------------------------------------------  IN: 2590 mL / OUT: 3075 mL / NET: -485 mL    26 Aug 2023 07:01  -  27 Aug 2023 06:14  --------------------------------------------------------  IN: 2820 mL / OUT: 3240 mL / NET: -420 mL        Physical Exam:  General Appearance: Appears well, NAD  Pulmonary: Nonlabored breathing, no respiratory distress  Cardiovascular: NSR  Abdomen: Soft, nondistneded, appropriate incisional tenderness, dressings clean and dry and intact. Vac, ostomy, EC fistula with appropriate appliance, draining.   Extremities: WWP, SCD's in place     LABS:                        9.2    10.62 )-----------( 282      ( 27 Aug 2023 05:30 )             28.5     08-27    x   |  102  |  9   ----------------------------<  111<H>  3.8   |  24  |  0.96    Ca    8.4      27 Aug 2023 05:30  Phos  3.4     08-27  Mg     1.7     08-27    TPro  7.6  /  Alb  1.9<L>  /  TBili  4.0<H>  /  DBili  2.6<H>  /  AST  69<H>  /  ALT  101<H>  /  AlkPhos  162<H>  08-26      Urinalysis Basic - ( 27 Aug 2023 05:30 )    Color: x / Appearance: x / SG: x / pH: x  Gluc: 111 mg/dL / Ketone: x  / Bili: x / Urobili: x   Blood: x / Protein: x / Nitrite: x   Leuk Esterase: x / RBC: x / WBC x   Sq Epi: x / Non Sq Epi: x / Bacteria: x

## 2023-08-27 NOTE — PROGRESS NOTE ADULT - ATTENDING COMMENTS
In chair. tolerating clear liquids  AFVSS  Abd soft, ND, NT.  VAC on.  output from fistula 1L/24h, but no leakage  Glucose improved but still on D20    PICC/TPN tomorrow.  Check INR  OOB, IS  Continue clear liquids as long as no leakage.

## 2023-08-27 NOTE — PROGRESS NOTE ADULT - SUBJECTIVE AND OBJECTIVE BOX
ON: No acute events overnight, finger sticks in 90's, tolerating clears.      SUBJECTIVE: Patient seen this morning during rounds, tolerating clears, no nausea, no vomiting.     MEDICATIONS  (STANDING):  amLODIPine   Tablet 10 milliGRAM(s) Oral every 24 hours  chlorhexidine 2% Cloths 1 Application(s) Topical <User Schedule>  chlorhexidine 2% Cloths 1 Application(s) Topical <User Schedule>  cholestyramine Powder (Sugar-Free) 4 Gram(s) Oral two times a day  dextrose 20%. 500 milliLiter(s) (65 mL/Hr) IV Continuous <Continuous>  dextrose 5%. 1000 milliLiter(s) (50 mL/Hr) IV Continuous <Continuous>  dextrose 50% Injectable 25 Gram(s) IV Push once  dextrose 50% Injectable 25 Gram(s) IV Push once  glucagon  Injectable 1 milliGRAM(s) IntraMuscular once  glucagon  Injectable 1 milliGRAM(s) IntraMuscular once  heparin   Injectable 5000 Unit(s) SubCutaneous every 8 hours  imipenem/cilastatin  IVPB 1000 milliGRAM(s) IV Intermittent every 8 hours  insulin lispro (ADMELOG) corrective regimen sliding scale   SubCutaneous every 6 hours  levothyroxine Injectable 40 MICROGram(s) IV Push <User Schedule>  loperamide 4 milliGRAM(s) Oral every 12 hours  losartan 25 milliGRAM(s) Oral daily  magnesium sulfate  IVPB 1 Gram(s) IV Intermittent once  metoprolol tartrate 25 milliGRAM(s) Oral every 12 hours  nystatin Powder 1 Application(s) Topical three times a day  pantoprazole  Injectable 40 milliGRAM(s) IV Push daily  potassium chloride  10 mEq/100 mL IVPB 10 milliEquivalent(s) IV Intermittent every 1 hour  venlafaxine XR. 300 milliGRAM(s) Oral daily    MEDICATIONS  (PRN):  benzocaine/menthol Lozenge 2 Lozenge Oral every 4 hours PRN Sore Throat  dextrose Oral Gel 15 Gram(s) Oral once PRN Blood Glucose LESS THAN 70 milliGRAM(s)/deciliter  melatonin 5 milliGRAM(s) Oral at bedtime PRN Sleep      Drips:     ICU Vital Signs Last 24 Hrs  T(C): 36.4 (27 Aug 2023 09:00), Max: 37.1 (26 Aug 2023 14:10)  T(F): 97.6 (27 Aug 2023 09:00), Max: 98.7 (26 Aug 2023 14:10)  HR: 82 (27 Aug 2023 08:00) (78 - 86)  BP: 121/60 (27 Aug 2023 08:00) (113/58 - 148/65)  BP(mean): 85 (27 Aug 2023 08:00) (77 - 109)  ABP: --  ABP(mean): --  RR: 23 (27 Aug 2023 08:00) (14 - 29)  SpO2: 94% (27 Aug 2023 08:00) (94% - 100%)    O2 Parameters below as of 27 Aug 2023 08:00  Patient On (Oxygen Delivery Method): room air            Physical Exam:  GENERAL: NAD, resting comfortably in bed, AxOx3, follows commands.   HEENT: NCAT, MMM  C/V: Normal rate, normal peripheral perfusion, no murmurs  PULM: Nonlabored breathing, no respiratory distress, CTA b/l  ABD: Soft, ND, NT, no rebound tenderness, no guarding, vac in place holding suction with well-sealed pouching and drains putting out serobilious output.   EXTREM: WWP, no edema, SCDs in place  NEURO: No focal deficits      I&O's Summary    26 Aug 2023 07:01  -  27 Aug 2023 07:00  --------------------------------------------------------  IN: 3200 mL / OUT: 4170 mL / NET: -970 mL    27 Aug 2023 07:01  -  27 Aug 2023 09:29  --------------------------------------------------------  IN: 165 mL / OUT: 0 mL / NET: 165 mL        LABS:                        9.2    10.62 )-----------( 282      ( 27 Aug 2023 05:30 )             28.5     08-27    132<L>  |  102  |  9   ----------------------------<  111<H>  3.8   |  24  |  0.96    Ca    8.4      27 Aug 2023 05:30  Phos  3.4     08-27  Mg     1.7     08-27    TPro  7.8  /  Alb  2.1<L>  /  TBili  3.5<H>  /  DBili  2.4<H>  /  AST  72<H>  /  ALT  97<H>  /  AlkPhos  174<H>  08-27      Urinalysis Basic - ( 27 Aug 2023 05:30 )    Color: x / Appearance: x / SG: x / pH: x  Gluc: 111 mg/dL / Ketone: x  / Bili: x / Urobili: x   Blood: x / Protein: x / Nitrite: x   Leuk Esterase: x / RBC: x / WBC x   Sq Epi: x / Non Sq Epi: x / Bacteria: x      CAPILLARY BLOOD GLUCOSE      POCT Blood Glucose.: 93 mg/dL (27 Aug 2023 07:10)  POCT Blood Glucose.: 89 mg/dL (26 Aug 2023 23:42)  POCT Blood Glucose.: 98 mg/dL (26 Aug 2023 19:48)  POCT Blood Glucose.: 85 mg/dL (26 Aug 2023 17:03)  POCT Blood Glucose.: 88 mg/dL (26 Aug 2023 14:44)  POCT Blood Glucose.: 90 mg/dL (26 Aug 2023 11:02)    LIVER FUNCTIONS - ( 27 Aug 2023 05:30 )  Alb: 2.1 g/dL / Pro: 7.8 g/dL / ALK PHOS: 174 U/L / ALT: 97 U/L / AST: 72 U/L / GGT: x             Cultures:  Blood culture 8/24/23: No growth so far   Culture picc line 8/23/23: No growth     RUQ US 8/26/23: Distended gallbladder containing sludge, nonspecific. No wall thickening or pericholecystic fluid and negative sonographic Capellan sign. CBD 6 mm.

## 2023-08-27 NOTE — PROGRESS NOTE ADULT - ASSESSMENT
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 Originally concerned for in-line sepsis now s/p PICC removal. However infectious w/u unrevealing and pt without other localizing symptoms or exam findings. ID consulted for antibiotic recs.     CT chest/abdomen/pelvis without obvious source of infection    Recommend:  1. Continue Imipenem 1 gram IV q8hrs  2. Follow up blood cultures    ID team 1 will follow.  Dr. Frye returns on 8/28/23

## 2023-08-27 NOTE — PROGRESS NOTE ADULT - SUBJECTIVE AND OBJECTIVE BOX
INTERVAL HPI/OVERNIGHT EVENTS:    Coverage for Dr. Frye (Team 1)    Patient was seen and examined at bedside. Sitting in chair.  Afebrile. Off pressors    CONSTITUTIONAL:  Negative fever or chills, feels well, good appetite  EYES:  Negative  blurry vision or double vision  CARDIOVASCULAR:  Negative for chest pain or palpitations  RESPIRATORY:  Negative for cough, wheezing, or SOB   GASTROINTESTINAL:  Negative for nausea, vomiting, diarrhea, constipation, or abdominal pain  GENITOURINARY:  Negative frequency, urgency or dysuria  NEUROLOGIC:  No headache, confusion, dizziness, lightheadedness      ANTIBIOTICS/RELEVANT:    MEDICATIONS  (STANDING):  amLODIPine   Tablet 10 milliGRAM(s) Oral every 24 hours  chlorhexidine 2% Cloths 1 Application(s) Topical <User Schedule>  chlorhexidine 2% Cloths 1 Application(s) Topical <User Schedule>  cholestyramine Powder (Sugar-Free) 4 Gram(s) Oral two times a day  dextrose 20%. 500 milliLiter(s) (65 mL/Hr) IV Continuous <Continuous>  dextrose 5%. 1000 milliLiter(s) (50 mL/Hr) IV Continuous <Continuous>  dextrose 50% Injectable 25 Gram(s) IV Push once  dextrose 50% Injectable 25 Gram(s) IV Push once  glucagon  Injectable 1 milliGRAM(s) IntraMuscular once  glucagon  Injectable 1 milliGRAM(s) IntraMuscular once  heparin   Injectable 5000 Unit(s) SubCutaneous every 8 hours  imipenem/cilastatin  IVPB 1000 milliGRAM(s) IV Intermittent every 8 hours  insulin lispro (ADMELOG) corrective regimen sliding scale   SubCutaneous every 6 hours  levothyroxine Injectable 40 MICROGram(s) IV Push <User Schedule>  loperamide 4 milliGRAM(s) Oral every 12 hours  losartan 25 milliGRAM(s) Oral daily  metoprolol tartrate 25 milliGRAM(s) Oral every 12 hours  nystatin Powder 1 Application(s) Topical three times a day  pantoprazole  Injectable 40 milliGRAM(s) IV Push daily  venlafaxine XR. 300 milliGRAM(s) Oral daily    MEDICATIONS  (PRN):  benzocaine/menthol Lozenge 2 Lozenge Oral every 4 hours PRN Sore Throat  dextrose Oral Gel 15 Gram(s) Oral once PRN Blood Glucose LESS THAN 70 milliGRAM(s)/deciliter  melatonin 5 milliGRAM(s) Oral at bedtime PRN Sleep        Vital Signs Last 24 Hrs  T(C): 36.4 (27 Aug 2023 13:00), Max: 37.1 (26 Aug 2023 14:10)  T(F): 97.6 (27 Aug 2023 13:00), Max: 98.7 (26 Aug 2023 14:10)  HR: 83 (27 Aug 2023 13:00) (78 - 86)  BP: 116/65 (27 Aug 2023 12:00) (113/58 - 138/64)  BP(mean): 82 (27 Aug 2023 12:00) (77 - 109)  RR: 20 (27 Aug 2023 13:00) (14 - 29)  SpO2: 99% (27 Aug 2023 13:00) (91% - 100%)    Parameters below as of 27 Aug 2023 13:00  Patient On (Oxygen Delivery Method): room air        PHYSICAL EXAM:  Constitutional: non-toxic, no distress  Eyes:PATRICIA, EOMI  Ear/Nose/Throat: no oral lesion, no sinus tenderness on percussion	  Neck:  supple  Respiratory: CTA jesus  Cardiovascular: S1S2 RRR, no murmurs  Gastrointestinal:soft, (+) BS, no HSM  Extremities:no e/e/c  Vascular: DP Pulse:	right normal; left normal      LABS:                        9.2    10.62 )-----------( 282      ( 27 Aug 2023 05:30 )             28.5     08-27    132<L>  |  102  |  9   ----------------------------<  111<H>  3.8   |  24  |  0.96    Ca    8.4      27 Aug 2023 05:30  Phos  3.4     08-27  Mg     1.7     08-27    TPro  7.8  /  Alb  2.1<L>  /  TBili  3.5<H>  /  DBili  2.4<H>  /  AST  72<H>  /  ALT  97<H>  /  AlkPhos  174<H>  08-27      Urinalysis Basic - ( 27 Aug 2023 05:30 )    Color: x / Appearance: x / SG: x / pH: x  Gluc: 111 mg/dL / Ketone: x  / Bili: x / Urobili: x   Blood: x / Protein: x / Nitrite: x   Leuk Esterase: x / RBC: x / WBC x   Sq Epi: x / Non Sq Epi: x / Bacteria: x        MICROBIOLOGY:    Culture - Blood (08.24.23 @ 21:14)    Specimen Source: .Blood Blood-Peripheral   Culture Results:   No growth at 2 days.    Culture - Blood (08.24.23 @ 21:14)    Specimen Source: .Blood Blood-Peripheral   Culture Results:   No growth at 2 days.        RADIOLOGY & ADDITIONAL STUDIES:

## 2023-08-27 NOTE — PROGRESS NOTE ADULT - ATTENDING COMMENTS
Crohn's, AF, s/p SBR, ileocolic resection with blowhole colostomy, end ileostomy, sepsis  physical as above  continue imipenem; temps and WBC are down  unclear exact source  hopefully PICC and TPN resume tomorrow  continue metoprolol and eliquis  renal function is stable  rest as above

## 2023-08-28 NOTE — PROGRESS NOTE ADULT - SUBJECTIVE AND OBJECTIVE BOX
SUBJECTIVE:   Patient seen and examined at bedside this AM   Wound vac obstructed since 11 pm last night. Patient started on minced and moist diet over the weekend. Afebrile and improving leukocytosis over the weekend   No specific complaints or concerns   Adequate spontaneous voids   Enteric output per ileostomy       MEDICATIONS  (STANDING):  amLODIPine   Tablet 10 milliGRAM(s) Oral every 24 hours  chlorhexidine 2% Cloths 1 Application(s) Topical <User Schedule>  chlorhexidine 2% Cloths 1 Application(s) Topical <User Schedule>  cholestyramine Powder (Sugar-Free) 4 Gram(s) Oral two times a day  dextrose 20%. 500 milliLiter(s) (65 mL/Hr) IV Continuous <Continuous>  dextrose 5%. 1000 milliLiter(s) (50 mL/Hr) IV Continuous <Continuous>  dextrose 50% Injectable 25 Gram(s) IV Push once  dextrose 50% Injectable 25 Gram(s) IV Push once  glucagon  Injectable 1 milliGRAM(s) IntraMuscular once  glucagon  Injectable 1 milliGRAM(s) IntraMuscular once  heparin   Injectable 5000 Unit(s) SubCutaneous every 8 hours  imipenem/cilastatin  IVPB 1000 milliGRAM(s) IV Intermittent every 8 hours  insulin lispro (ADMELOG) corrective regimen sliding scale   SubCutaneous every 6 hours  levothyroxine Injectable 40 MICROGram(s) IV Push <User Schedule>  loperamide 4 milliGRAM(s) Oral every 12 hours  losartan 25 milliGRAM(s) Oral daily  metoprolol tartrate 25 milliGRAM(s) Oral every 12 hours  nystatin Powder 1 Application(s) Topical three times a day  pantoprazole  Injectable 40 milliGRAM(s) IV Push daily  venlafaxine XR. 300 milliGRAM(s) Oral daily    MEDICATIONS  (PRN):  benzocaine/menthol Lozenge 2 Lozenge Oral every 4 hours PRN Sore Throat  dextrose Oral Gel 15 Gram(s) Oral once PRN Blood Glucose LESS THAN 70 milliGRAM(s)/deciliter  melatonin 5 milliGRAM(s) Oral at bedtime PRN Sleep      Vital Signs Last 24 Hrs  T(C): 36.8 (28 Aug 2023 06:01), Max: 36.8 (27 Aug 2023 21:00)  T(F): 98.2 (28 Aug 2023 06:01), Max: 98.3 (27 Aug 2023 21:00)  HR: 81 (28 Aug 2023 07:00) (79 - 84)  BP: 138/63 (28 Aug 2023 07:00) (102/66 - 143/65)  BP(mean): 86 (28 Aug 2023 07:00) (78 - 97)  RR: 18 (28 Aug 2023 07:00) (17 - 30)  SpO2: 92% (28 Aug 2023 07:00) (90% - 100%)    Parameters below as of 28 Aug 2023 07:00  Patient On (Oxygen Delivery Method): room air        Physical Exam:  General: NAD, resting comfortably in bed  C/V: NSR  Pulm: Nonlabored breathing, no respiratory distress  Abd: soft, wound vac in place but obstructed/no suction, Poole drain to suction in fistula with surrounding wound manager to gravity, JOYCE drain in left abdomen with serous output, ileostomy pink, patent, and productive of loose enteric content.  Extrem: WWP, no edema, SCDs in place   Neuro: A&Ox3; no focal deficits     I&O's Summary    27 Aug 2023 07:01  -  28 Aug 2023 07:00  --------------------------------------------------------  IN: 3084 mL / OUT: 3470 mL / NET: -386 mL    28 Aug 2023 07:01  -  28 Aug 2023 07:50  --------------------------------------------------------  IN: 65 mL / OUT: 0 mL / NET: 65 mL        LABS:                        9.4    11.21 )-----------( 272      ( 28 Aug 2023 05:17 )             29.5     08-28    134<L>  |  103  |  8   ----------------------------<  97  4.3   |  24  |  0.97    Ca    8.7      28 Aug 2023 05:17  Phos  3.4     08-28  Mg     1.7     08-28    TPro  8.1  /  Alb  2.2<L>  /  TBili  3.8<H>  /  DBili  2.4<H>  /  AST  67<H>  /  ALT  89<H>  /  AlkPhos  198<H>  08-28    PT/INR - ( 28 Aug 2023 05:17 )   PT: 15.5 sec;   INR: 1.37          PTT - ( 28 Aug 2023 05:17 )  PTT:35.0 sec  Urinalysis Basic - ( 28 Aug 2023 05:17 )    Color: x / Appearance: x / SG: x / pH: x  Gluc: 97 mg/dL / Ketone: x  / Bili: x / Urobili: x   Blood: x / Protein: x / Nitrite: x   Leuk Esterase: x / RBC: x / WBC x   Sq Epi: x / Non Sq Epi: x / Bacteria: x      CAPILLARY BLOOD GLUCOSE      POCT Blood Glucose.: 116 mg/dL (28 Aug 2023 06:49)  POCT Blood Glucose.: 107 mg/dL (28 Aug 2023 03:39)  POCT Blood Glucose.: 69 mg/dL (28 Aug 2023 03:01)  POCT Blood Glucose.: 98 mg/dL (27 Aug 2023 22:49)  POCT Blood Glucose.: 98 mg/dL (27 Aug 2023 16:09)  POCT Blood Glucose.: 90 mg/dL (27 Aug 2023 11:26)    LIVER FUNCTIONS - ( 28 Aug 2023 05:17 )  Alb: 2.2 g/dL / Pro: 8.1 g/dL / ALK PHOS: 198 U/L / ALT: 89 U/L / AST: 67 U/L / GGT: x             RADIOLOGY & ADDITIONAL STUDIES:

## 2023-08-28 NOTE — PROGRESS NOTE ADULT - ASSESSMENT
78 M w/ Crohn's, AFib/Flutter s/p DCCVs on amiodarone, remote ileocectomy and open appendectomy. Admitted (6/23) for SBO vs Crohns flare, s/p NGT decompression and s/p lap TRE converted to open TRE, SBR x 3, left in discontinuity with abthera vac on (6/27), RTOR for ileocolic resection, small bowel anastomosis, and abdominal wall closure on (6/28), c/b fluid collection s/p IR aspiration of perihepatic fluid on (7/3), c/b wound dehiscence s/p RTOR exlap, washout, ileocolic resection with end ileostomy, blow hole colostomy, red rubber from ileostomy to small bowel anastomosis; vicryl bridging mesh on (7/5). Went to SICU but extubated 7/6 and SDU 8/2. Has been recovering on telemetry with ECF management and prolonged TPN. Returns to SICU 8/23 with AMS and concern for sepsis.     NEURO: AMS (resolved): hold off sedating meds, dc scopolamine patch. Cont outpatient effexor, Pain: Dilaudid for vac changes.   CV: Sepsis without shock (resolved). Hx Afib/flutter: s/p DCCV, DC amiodarone given LFTs, cont Lopressor, EP on consult. Hx HTN on Norvasc, Losartan. Hx HLD: hold Lipitor given LFTs. TTE  (7/18) - PASP 64mmHg, EF 65-70%,   PULM: no resp distress on room air.   GI/FEN: Adv to low res. Calorie count. f/u Fecal fat Tuesday. EC Fistula: TPN on hold due to concerns for line sepsis. Abnormal LFTs: distended gallbladder on CT 8/25, RUQ US negative, DC amio and Lipitor. Octreotide on hold.   : Hyponatremia: improving, now off 3% saline 30cc/hr (due to free water intake vs SIADH). Voids.   ENDO: ISS. Hx hypothyroid: IV Synthroid, TSH 9 (8/23), recheck in 2-3 weeks. Hypoglycemia: D20 at 65cc/hr, monitor FBG.  ID: Sepsis (WBC and fever curve improving): concern for line sepsis given prolonged PICC from 6/30, bld cx NGTD, CTAP (8/25) without fluid collections, distended GB, atelectasis. Restart Imipenem (8/26--). //   Previously had C. tertium, Lactobacillis from his IR cx 7/3, and candida albicans, lactobacillus, vanc sensitive E faecium, vanc resistant E gallinarum, vanc resistant E casseliflavus, lactobacillus from his OR cx 7/5. Completed course of abx with imipenem (6/30-7/12, 7/23-7/30), Dapto (6/30-7/5 and 7/23-7/24). been off all abx since 7/30. empiric dapto (8/23-24) and cefepime (8/23-24).   PPX: SCDs, SQH  LINES: PIV // DC: RUE PICC (6/30-8/23), R Chest tube for iatrogenic PTX (6/27-7/3), R TLC (06/26-30), R TLC (7/5 -7/12)  WOUNDS/DRAINS: ECF abdominal wound with vac change Mon/Fri--next on 8/28. PT: Home health PT  DISPO: SDU

## 2023-08-28 NOTE — PROGRESS NOTE ADULT - ASSESSMENT
7/3, stepped down to telemetry on 7/4. wound dehiscence on 7/5, RTOR ex-lap, washout, ileocolic resection with end ileostomy, blow hole colostomy, red rubber from ileostomy to small bowel anastomosis; Vicryl bridging mesh on 7/5. Transferred to SICU post op for HD monitoring. Extubated 7/6. Surgical wound with decreasing in size and granulating with Vac. Tentative plan for skin graft per Plastics; timing TBD pending proper wound shrinkage and granulation   8/23: Upgraded to SICU 8/23 for acute delirium and tremors, r/o sepsis vs metabolic encephalopathy. Ammonia levels normal    8/25: Spiked temp to 101.3 overnight w/ new leukocytosis to 14   8/28: Wound vac obstructed overnight. Afebrile and improving leukocytosis. No growth from blood cultures     Plan:   - Wound vac change this AM; continue Monday & Friday   - Continue IV Abx   - Minced and moist diet   - Plan to replace PICC once blood cultures finalize, and resume TPN   - Continue Loperamide; monitor fistula drain and wound manager output   - PRN pain and nausea control   - Hx of A-fib/flutter; continue rate and rhythm control   - Encourage IS/OOB   - DVT ppx   - PT   - Tentative plan for skin graft per Plastics; timing TBD pending proper wound granulation   - Appreciate SICU care     D/w chief resident and attending

## 2023-08-28 NOTE — PROGRESS NOTE ADULT - SUBJECTIVE AND OBJECTIVE BOX
INTERVAL HPI/OVERNIGHT EVENTS:  ON: FS made q4. All recent Cx NGTD. FS 69 - given .5 amp D50 - improved to 107. AM INR 1.37.    STATUS POST:    6/27: Laparoscopic lysis of adhesions, converted to open lysis of adhesions, SBR x 3, temporary abdominal closure, 5L cryst, 1L 5% alb, 3 pRBC, 1 FFP, 1 plts  6/28: ex lap, removal of abthera, ileocolic resection, small bowel anastamosis, , 1.6 crystalloid, 250 5%, 175 uop   7/3: IR Gendel drained perihepatic aspiration of serous fluid.   7/5: RTOR exlap, washout, ileocolic resection with end ileostomy, blow hole colostomy, fistula, red rubber from ileostomy to small bowel anastomosis; vicryl bridging mesh; R JOYCE below ileostomy, L JOYCE at small bowel enterotomy repair; 500 LR, 500 5% albumin, 3u PRBC, 2 FFP, 400 UOP,     SUBJECTIVE:  Patient seen and examined by chief resident and team on AM rounds. Patient tolerating CLD, without nausea or vomiting. Wound vac with blockage noted this AM at bedside, will replace later today. Will continue to f/u FSG q4.     MEDICATIONS  (STANDING):  amLODIPine   Tablet 10 milliGRAM(s) Oral every 24 hours  chlorhexidine 2% Cloths 1 Application(s) Topical <User Schedule>  chlorhexidine 2% Cloths 1 Application(s) Topical <User Schedule>  cholestyramine Powder (Sugar-Free) 4 Gram(s) Oral two times a day  dextrose 20%. 500 milliLiter(s) (65 mL/Hr) IV Continuous <Continuous>  dextrose 5%. 1000 milliLiter(s) (50 mL/Hr) IV Continuous <Continuous>  dextrose 50% Injectable 25 Gram(s) IV Push once  dextrose 50% Injectable 25 Gram(s) IV Push once  glucagon  Injectable 1 milliGRAM(s) IntraMuscular once  glucagon  Injectable 1 milliGRAM(s) IntraMuscular once  heparin   Injectable 5000 Unit(s) SubCutaneous every 8 hours  imipenem/cilastatin  IVPB 1000 milliGRAM(s) IV Intermittent every 8 hours  insulin lispro (ADMELOG) corrective regimen sliding scale   SubCutaneous every 6 hours  levothyroxine Injectable 40 MICROGram(s) IV Push <User Schedule>  loperamide 4 milliGRAM(s) Oral every 12 hours  losartan 25 milliGRAM(s) Oral daily  metoprolol tartrate 25 milliGRAM(s) Oral every 12 hours  nystatin Powder 1 Application(s) Topical three times a day  pantoprazole  Injectable 40 milliGRAM(s) IV Push daily  venlafaxine XR. 300 milliGRAM(s) Oral daily    MEDICATIONS  (PRN):  benzocaine/menthol Lozenge 2 Lozenge Oral every 4 hours PRN Sore Throat  dextrose Oral Gel 15 Gram(s) Oral once PRN Blood Glucose LESS THAN 70 milliGRAM(s)/deciliter  melatonin 5 milliGRAM(s) Oral at bedtime PRN Sleep      Vital Signs Last 24 Hrs  T(C): 37 (28 Aug 2023 09:15), Max: 37 (28 Aug 2023 09:15)  T(F): 98.6 (28 Aug 2023 09:15), Max: 98.6 (28 Aug 2023 09:15)  HR: 78 (28 Aug 2023 09:15) (78 - 84)  BP: 136/63 (28 Aug 2023 08:00) (102/66 - 143/65)  BP(mean): 91 (28 Aug 2023 08:00) (78 - 97)  RR: 18 (28 Aug 2023 09:15) (17 - 30)  SpO2: 90% (28 Aug 2023 09:15) (90% - 100%)    Parameters below as of 28 Aug 2023 08:00  Patient On (Oxygen Delivery Method): room air        PHYSICAL EXAM:  GENERAL: NAD, resting comfortably in bed, AxOx3, follows commands.   HEENT: NCAT, MMM  C/V: RRR, normal peripheral perfusion, no murmurs  PULM: Nonlabored breathing, no respiratory distress, CTA b/l  ABD: Soft, NTND. No rebound tenderness, no guarding. Midline wound vac in place w/ blockage noted in system. ECF with bilious output. Bilious output from drainage bags.   EXTREM: WWP, no edema, SCDs in place  NEURO: No focal deficits        I&O's Detail    27 Aug 2023 07:01  -  28 Aug 2023 07:00  --------------------------------------------------------  IN:    dextrose 20%: 1560 mL    IV PiggyBack: 200 mL    IV PiggyBack: 750 mL    IV PiggyBack: 100 mL    Oral Fluid: 714 mL  Total IN: 3324 mL    OUT:    Bulb (mL): 5 mL    Drain (mL): 475 mL    Drain (mL): 325 mL    Ileostomy (mL): 150 mL    VAC (Vacuum Assisted Closure) System (mL): 1050 mL    Voided (mL): 1665 mL  Total OUT: 3670 mL    Total NET: -346 mL      28 Aug 2023 07:01  -  28 Aug 2023 10:15  --------------------------------------------------------  IN:    dextrose 20%: 195 mL    Oral Fluid: 50 mL  Total IN: 245 mL    OUT:    Voided (mL): 150 mL  Total OUT: 150 mL    Total NET: 95 mL          LABS:                        9.4    11.21 )-----------( 272      ( 28 Aug 2023 05:17 )             29.5     08-28    134<L>  |  103  |  8   ----------------------------<  97  4.3   |  24  |  0.97    Ca    8.7      28 Aug 2023 05:17  Phos  3.4     08-28  Mg     1.7     08-28    TPro  8.1  /  Alb  2.2<L>  /  TBili  3.8<H>  /  DBili  2.4<H>  /  AST  67<H>  /  ALT  89<H>  /  AlkPhos  198<H>  08-28    PT/INR - ( 28 Aug 2023 05:17 )   PT: 15.5 sec;   INR: 1.37          PTT - ( 28 Aug 2023 05:17 )  PTT:35.0 sec  Urinalysis Basic - ( 28 Aug 2023 05:17 )    Color: x / Appearance: x / SG: x / pH: x  Gluc: 97 mg/dL / Ketone: x  / Bili: x / Urobili: x   Blood: x / Protein: x / Nitrite: x   Leuk Esterase: x / RBC: x / WBC x   Sq Epi: x / Non Sq Epi: x / Bacteria: x        RADIOLOGY & ADDITIONAL STUDIES:

## 2023-08-28 NOTE — PROGRESS NOTE ADULT - SUBJECTIVE AND OBJECTIVE BOX
INFECTIOUS DISEASES CONSULT FOLLOW-UP NOTE    INTERVAL HPI/OVERNIGHT EVENTS:      ROS:   Constitutional, eyes, ENT, cardiovascular, respiratory, gastrointestinal, genitourinary, integumentary, neurological, psychiatric and heme/lymph are otherwise negative other than noted above       ANTIBIOTICS/RELEVANT:    MEDICATIONS  (STANDING):  amLODIPine   Tablet 10 milliGRAM(s) Oral every 24 hours  chlorhexidine 2% Cloths 1 Application(s) Topical <User Schedule>  chlorhexidine 2% Cloths 1 Application(s) Topical <User Schedule>  cholestyramine Powder (Sugar-Free) 4 Gram(s) Oral two times a day  dextrose 20%. 500 milliLiter(s) (65 mL/Hr) IV Continuous <Continuous>  dextrose 5%. 1000 milliLiter(s) (50 mL/Hr) IV Continuous <Continuous>  dextrose 50% Injectable 25 Gram(s) IV Push once  dextrose 50% Injectable 25 Gram(s) IV Push once  glucagon  Injectable 1 milliGRAM(s) IntraMuscular once  glucagon  Injectable 1 milliGRAM(s) IntraMuscular once  heparin   Injectable 5000 Unit(s) SubCutaneous every 8 hours  imipenem/cilastatin  IVPB 1000 milliGRAM(s) IV Intermittent every 8 hours  insulin lispro (ADMELOG) corrective regimen sliding scale   SubCutaneous every 6 hours  levothyroxine Injectable 40 MICROGram(s) IV Push <User Schedule>  loperamide 4 milliGRAM(s) Oral every 12 hours  losartan 25 milliGRAM(s) Oral daily  metoprolol tartrate 25 milliGRAM(s) Oral every 12 hours  nystatin Powder 1 Application(s) Topical three times a day  pantoprazole  Injectable 40 milliGRAM(s) IV Push daily  venlafaxine XR. 300 milliGRAM(s) Oral daily    MEDICATIONS  (PRN):  benzocaine/menthol Lozenge 2 Lozenge Oral every 4 hours PRN Sore Throat  dextrose Oral Gel 15 Gram(s) Oral once PRN Blood Glucose LESS THAN 70 milliGRAM(s)/deciliter  melatonin 5 milliGRAM(s) Oral at bedtime PRN Sleep        Vital Signs Last 24 Hrs  T(C): 36.8 (28 Aug 2023 06:01), Max: 36.8 (27 Aug 2023 21:00)  T(F): 98.2 (28 Aug 2023 06:01), Max: 98.3 (27 Aug 2023 21:00)  HR: 81 (28 Aug 2023 07:00) (79 - 84)  BP: 138/63 (28 Aug 2023 07:00) (102/66 - 143/65)  BP(mean): 86 (28 Aug 2023 07:00) (78 - 97)  RR: 18 (28 Aug 2023 07:00) (17 - 30)  SpO2: 92% (28 Aug 2023 07:00) (90% - 100%)    Parameters below as of 28 Aug 2023 07:00  Patient On (Oxygen Delivery Method): room air        08-27-23 @ 07:01  -  08-28-23 @ 07:00  --------------------------------------------------------  IN: 3084 mL / OUT: 3470 mL / NET: -386 mL    08-28-23 @ 07:01  -  08-28-23 @ 07:51  --------------------------------------------------------  IN: 65 mL / OUT: 0 mL / NET: 65 mL      PHYSICAL EXAM:  Constitutional: alert, NAD  HEENT: NCAT PERRLA, no palpable lymphadenopathy.   Pulmonary: no respiratory distress and lungs were clear to auscultation bilaterally.   Heart: heart rate was normal and rhythm regular, normal S1 and S2  Abdomen: Soft nontender. Wound vac in place, pouch w/ bilious output. R ileostomy & L JOYCE drain, surrounding skin C/d/i. Ext: Site of previous PICC-line on L arm healed, c/d/i.   Neurological: no focal deficits. AOx3        LABS:                        9.4    11.21 )-----------( 272      ( 28 Aug 2023 05:17 )             29.5     08-28    134<L>  |  103  |  8   ----------------------------<  97  4.3   |  24  |  0.97    Ca    8.7      28 Aug 2023 05:17  Phos  3.4     08-28  Mg     1.7     08-28    TPro  8.1  /  Alb  2.2<L>  /  TBili  3.8<H>  /  DBili  2.4<H>  /  AST  67<H>  /  ALT  89<H>  /  AlkPhos  198<H>  08-28    PT/INR - ( 28 Aug 2023 05:17 )   PT: 15.5 sec;   INR: 1.37          PTT - ( 28 Aug 2023 05:17 )  PTT:35.0 sec  Urinalysis Basic - ( 28 Aug 2023 05:17 )    Color: x / Appearance: x / SG: x / pH: x  Gluc: 97 mg/dL / Ketone: x  / Bili: x / Urobili: x   Blood: x / Protein: x / Nitrite: x   Leuk Esterase: x / RBC: x / WBC x   Sq Epi: x / Non Sq Epi: x / Bacteria: x        MICROBIOLOGY:      RADIOLOGY & ADDITIONAL STUDIES:  Reviewed INFECTIOUS DISEASES CONSULT FOLLOW-UP NOTE    INTERVAL HPI/OVERNIGHT EVENTS/ROS: Feels overall well, a little nauseous last night but it's improved. No chest pain, palpitations, vomiting, new rashes, other sx. No other complaints at this time.   Constitutional, eyes, ENT, cardiovascular, respiratory, gastrointestinal, genitourinary, integumentary, neurological, psychiatric and heme/lymph are otherwise negative other than noted above       ANTIBIOTICS/RELEVANT:    MEDICATIONS  (STANDING):  amLODIPine   Tablet 10 milliGRAM(s) Oral every 24 hours  chlorhexidine 2% Cloths 1 Application(s) Topical <User Schedule>  chlorhexidine 2% Cloths 1 Application(s) Topical <User Schedule>  cholestyramine Powder (Sugar-Free) 4 Gram(s) Oral two times a day  dextrose 20%. 500 milliLiter(s) (65 mL/Hr) IV Continuous <Continuous>  dextrose 5%. 1000 milliLiter(s) (50 mL/Hr) IV Continuous <Continuous>  dextrose 50% Injectable 25 Gram(s) IV Push once  dextrose 50% Injectable 25 Gram(s) IV Push once  glucagon  Injectable 1 milliGRAM(s) IntraMuscular once  glucagon  Injectable 1 milliGRAM(s) IntraMuscular once  heparin   Injectable 5000 Unit(s) SubCutaneous every 8 hours  imipenem/cilastatin  IVPB 1000 milliGRAM(s) IV Intermittent every 8 hours  insulin lispro (ADMELOG) corrective regimen sliding scale   SubCutaneous every 6 hours  levothyroxine Injectable 40 MICROGram(s) IV Push <User Schedule>  loperamide 4 milliGRAM(s) Oral every 12 hours  losartan 25 milliGRAM(s) Oral daily  metoprolol tartrate 25 milliGRAM(s) Oral every 12 hours  nystatin Powder 1 Application(s) Topical three times a day  pantoprazole  Injectable 40 milliGRAM(s) IV Push daily  venlafaxine XR. 300 milliGRAM(s) Oral daily    MEDICATIONS  (PRN):  benzocaine/menthol Lozenge 2 Lozenge Oral every 4 hours PRN Sore Throat  dextrose Oral Gel 15 Gram(s) Oral once PRN Blood Glucose LESS THAN 70 milliGRAM(s)/deciliter  melatonin 5 milliGRAM(s) Oral at bedtime PRN Sleep        Vital Signs Last 24 Hrs  T(C): 36.8 (28 Aug 2023 06:01), Max: 36.8 (27 Aug 2023 21:00)  T(F): 98.2 (28 Aug 2023 06:01), Max: 98.3 (27 Aug 2023 21:00)  HR: 81 (28 Aug 2023 07:00) (79 - 84)  BP: 138/63 (28 Aug 2023 07:00) (102/66 - 143/65)  BP(mean): 86 (28 Aug 2023 07:00) (78 - 97)  RR: 18 (28 Aug 2023 07:00) (17 - 30)  SpO2: 92% (28 Aug 2023 07:00) (90% - 100%)    Parameters below as of 28 Aug 2023 07:00  Patient On (Oxygen Delivery Method): room air        08-27-23 @ 07:01  -  08-28-23 @ 07:00  --------------------------------------------------------  IN: 3084 mL / OUT: 3470 mL / NET: -386 mL    08-28-23 @ 07:01  -  08-28-23 @ 07:51  --------------------------------------------------------  IN: 65 mL / OUT: 0 mL / NET: 65 mL      PHYSICAL EXAM:  Constitutional: alert, NAD  HEENT: NCAT PERRLA, no palpable lymphadenopathy.   Pulmonary: no respiratory distress and lungs were clear to auscultation bilaterally.   Heart: heart rate was normal and rhythm regular, normal S1 and S2  Abdomen: Soft nontender. Wound vac in place, pouch w/ bilious output. R ileostomy & L JOYCE drain, surrounding skin C/d/i. Ext: Site of previous PICC-line on L arm healed, c/d/i.   Neurological: no focal deficits. AOx3        LABS:                        9.4    11.21 )-----------( 272      ( 28 Aug 2023 05:17 )             29.5     08-28    134<L>  |  103  |  8   ----------------------------<  97  4.3   |  24  |  0.97    Ca    8.7      28 Aug 2023 05:17  Phos  3.4     08-28  Mg     1.7     08-28    TPro  8.1  /  Alb  2.2<L>  /  TBili  3.8<H>  /  DBili  2.4<H>  /  AST  67<H>  /  ALT  89<H>  /  AlkPhos  198<H>  08-28    PT/INR - ( 28 Aug 2023 05:17 )   PT: 15.5 sec;   INR: 1.37          PTT - ( 28 Aug 2023 05:17 )  PTT:35.0 sec  Urinalysis Basic - ( 28 Aug 2023 05:17 )    Color: x / Appearance: x / SG: x / pH: x  Gluc: 97 mg/dL / Ketone: x  / Bili: x / Urobili: x   Blood: x / Protein: x / Nitrite: x   Leuk Esterase: x / RBC: x / WBC x   Sq Epi: x / Non Sq Epi: x / Bacteria: x        MICROBIOLOGY:      RADIOLOGY & ADDITIONAL STUDIES:  Reviewed

## 2023-08-28 NOTE — PROGRESS NOTE ADULT - ASSESSMENT
78 M w/ Crohn's, AFib/Flutter s/p DCCVs on amiodarone, remote ileocectomy and open appendectomy. Admitted (6/23) for SBO vs Crohns flare, s/p NGT decompression and s/p lap TRE converted to open TRE, SBR x 3, left in discontinuity with abthera vac on (6/27), RTOR for ileocolic resection, small bowel anastomosis, and abdominal wall closure on (6/28), c/b fluid collection s/p IR aspiration of perihepatic fluid on (7/3), c/b wound dehiscence s/p RTOR exlap, washout, ileocolic resection with end ileostomy, blow hole colostomy, red rubber from ileostomy to small bowel anastomosis; vicryl bridging mesh on (7/5). Went to SICU but extubated 7/6 and SDU 8/2. Has been recovering on telemetry with ECF management and prolonged TPN. Returns to SICU 8/23 with AMS and concern for sepsis.     Recommendations:  - Advance to LRD, Calorie count  - Hold off on PICC/TPN at this time  - Wound Vac change this AM, next Friday  Plan discussed with chief resident and attending, Dr. Peterson

## 2023-08-28 NOTE — PROGRESS NOTE ADULT - ATTENDING COMMENTS
Crohn's,  AF s/p SBR with ileocolic resection, blowhole colosomy, end ileostomy, chronic fistula  physical as above  high residue diet per surgery  finish imipenem course tomorrow  continue metoprolol  follow LFT off amiodarone and statin

## 2023-08-28 NOTE — PROGRESS NOTE ADULT - SUBJECTIVE AND OBJECTIVE BOX
Patient seen and examined at bedside.    Overnight Events: No acute events        Current Meds:  amLODIPine   Tablet 10 milliGRAM(s) Oral every 24 hours  benzocaine/menthol Lozenge 2 Lozenge Oral every 4 hours PRN  chlorhexidine 2% Cloths 1 Application(s) Topical <User Schedule>  chlorhexidine 2% Cloths 1 Application(s) Topical <User Schedule>  cholestyramine Powder (Sugar-Free) 4 Gram(s) Oral two times a day  dextrose 20%. 500 milliLiter(s) IV Continuous <Continuous>  dextrose 5%. 1000 milliLiter(s) IV Continuous <Continuous>  dextrose 50% Injectable 25 Gram(s) IV Push once  dextrose 50% Injectable 25 Gram(s) IV Push once  dextrose Oral Gel 15 Gram(s) Oral once PRN  glucagon  Injectable 1 milliGRAM(s) IntraMuscular once  glucagon  Injectable 1 milliGRAM(s) IntraMuscular once  heparin   Injectable 5000 Unit(s) SubCutaneous every 8 hours  imipenem/cilastatin  IVPB 1000 milliGRAM(s) IV Intermittent every 8 hours  insulin lispro (ADMELOG) corrective regimen sliding scale   SubCutaneous every 6 hours  levothyroxine Injectable 40 MICROGram(s) IV Push <User Schedule>  loperamide 4 milliGRAM(s) Oral every 12 hours  losartan 25 milliGRAM(s) Oral daily  melatonin 5 milliGRAM(s) Oral at bedtime PRN  metoprolol tartrate 25 milliGRAM(s) Oral every 12 hours  nystatin Powder 1 Application(s) Topical three times a day  pantoprazole  Injectable 40 milliGRAM(s) IV Push daily  venlafaxine XR. 300 milliGRAM(s) Oral daily      Vitals:  T(F): 98.6 (08-28), Max: 98.6 (08-28)  HR: 78 (08-28) (78 - 84)  BP: 136/63 (08-28) (102/66 - 143/65)  RR: 18 (08-28)  SpO2: 90% (08-28)  I&O's Summary    27 Aug 2023 07:01  -  28 Aug 2023 07:00  --------------------------------------------------------  IN: 3324 mL / OUT: 3670 mL / NET: -346 mL    28 Aug 2023 07:01  -  28 Aug 2023 10:46  --------------------------------------------------------  IN: 245 mL / OUT: 150 mL / NET: 95 mL        Physical Exam:  Appearance: No acute distress  Eyes: PERRL, EOMI, pink conjunctiva  HEENT: Normal oral mucosa  Cardiovascular: RRR, S1, S2, no murmurs, rubs, or gallops; no edema; no JVD  Respiratory: Clear to auscultation bilaterally  Gastrointestinal: Complex abdominal wound as documented elsewhere   Musculoskeletal: No clubbing; no joint deformity   Neurologic: Non-focal  Lymphatic: No lymphadenopathy  Psychiatry: AAOx3  Skin: No rashes, ecchymoses, or cyanosis                          9.4    11.21 )-----------( 272      ( 28 Aug 2023 05:17 )             29.5     08-28    134<L>  |  103  |  8   ----------------------------<  97  4.3   |  24  |  0.97    Ca    8.7      28 Aug 2023 05:17  Phos  3.4     08-28  Mg     1.7     08-28    TPro  8.1  /  Alb  2.2<L>  /  TBili  3.8<H>  /  DBili  2.4<H>  /  AST  67<H>  /  ALT  89<H>  /  AlkPhos  198<H>  08-28    PT/INR - ( 28 Aug 2023 05:17 )   PT: 15.5 sec;   INR: 1.37          PTT - ( 28 Aug 2023 05:17 )  PTT:35.0 sec    TTE Echo Complete w/o Contrast w/ Doppler (07.18.23 @ 14:59) >  CONCLUSIONS:   1. Normal left ventricular size and systolic function.   2. Mild symmetric left ventricular hypertrophy.   3. Grade III left ventricular diastolic dysfunction.   4. Normal right ventricular size and systolic function.   5. Mildly dilated left atrium.   6. Aortic sclerosis without significant stenosis.   7. Mild aortic regurgitation.   8. Mild tricuspid regurgitation.   9. Pulmonary hypertension present, pulmonary artery systolic pressure is 63 mmHg.  10. No pericardial effusion.  11. Compared to the previous TTE performed on 4/7/2022, PASP is higher in this study.  The left ventricle is normal in size and systolic function with a calculated ejection fraction of65-70%.    Prior EP procedures:  s/p cardioversion 2010 and 2014  reports 4 DCCV,  Last 4 yrs ago at [facility]    Cath / stress / Cardiac CTa: none

## 2023-08-28 NOTE — PROGRESS NOTE ADULT - ATTENDING COMMENTS
#severe sepsis, fever    Patient clinically doing well, asymptomatic, WBC downtrending to 11.  All infectious work-up negative. Complete empiric imipenem 1g IV q8h on 8/29 (total 5 day course of abx).    Thank you for your consult.  Please re-consult us or call us with questions.  Case d/w primary team.    Angie Frye MD, MS  Infectious Disease attending  work cell [phone]  For any questions during evening/weekend/holiday, please page ID on call

## 2023-08-28 NOTE — PROGRESS NOTE ADULT - ASSESSMENT
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 Originally concerned for in-line sepsis now s/p PICC removal. However infectious w/u unrevealing and pt without other localizing symptoms or exam findings. ID consulted for antibiotic recs.     CT CAP w/o obvious sources of infection.     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 Originally concerned for in-line sepsis now s/p PICC removal. However infectious w/u unrevealing and pt without other localizing symptoms or exam findings. ID consulted for antibiotic recs.     CT CAP w/o obvious sources of infection. Remained afebrile, improving leukocytosis. Clinically improved w/o further evidence continued infection. Can c/w abx for 1 more day.    Recommendations:   - c/w Imipenem 1g q8h to end 8/29 (tomorrow), no indication for further abx at this time  - ID Team 1 to sign off, re-consult as needed.

## 2023-08-28 NOTE — PROGRESS NOTE ADULT - ASSESSMENT
Assessment & Plan:  77 yo M w/ h/o Crohn's, AFib/Flutter s/p multiple DCCVs on amio, with a complicated and prolonged abdominal surgical history including open TRE, SBR x 3, RTOR x2 for exlap further cb high output fistula from abd wound, subsequently requiring TPN via PICC for nutritional support since 6/30, was eventually downgraded but now returns to SICU for further management of sepsis ISO line+/- abdominal source, now with encephalopathy. EP consulted regarding junctional rhythm and elevated LFTs.     - LFTs remain abnormal, agree with continuing to hold amiodarone   - remains in what appears to be accelerated junctional at 80 beats per minute, consistent over 24 hours, no indication for intervention at this time  - please repeat 12 lead EKG   - my continue metoprolol,  - EP will continue to follow

## 2023-08-28 NOTE — PROGRESS NOTE ADULT - SUBJECTIVE AND OBJECTIVE BOX
Awake and alert Had nausea briefly overnight  VAC not functioning correctly  Afeb VS stable In RR Good BS No edema  Labs stable

## 2023-08-28 NOTE — PROGRESS NOTE ADULT - SUBJECTIVE AND OBJECTIVE BOX
ON: FS made q4. All recent Cx NGTD. FS 69 - given .5 amp D50 - improved to 107. AM INR 1.37.    SUBJECTIVE: Patient seen and examined at bedside. In good spirits, tolerating PO without n/v. Denies cp, sob.    MEDICATIONS  (STANDING):  amLODIPine   Tablet 10 milliGRAM(s) Oral every 24 hours  chlorhexidine 2% Cloths 1 Application(s) Topical <User Schedule>  chlorhexidine 2% Cloths 1 Application(s) Topical <User Schedule>  cholestyramine Powder (Sugar-Free) 4 Gram(s) Oral two times a day  dextrose 20%. 500 milliLiter(s) (65 mL/Hr) IV Continuous <Continuous>  dextrose 5%. 1000 milliLiter(s) (50 mL/Hr) IV Continuous <Continuous>  dextrose 50% Injectable 25 Gram(s) IV Push once  dextrose 50% Injectable 25 Gram(s) IV Push once  glucagon  Injectable 1 milliGRAM(s) IntraMuscular once  glucagon  Injectable 1 milliGRAM(s) IntraMuscular once  heparin   Injectable 5000 Unit(s) SubCutaneous every 8 hours  imipenem/cilastatin  IVPB 1000 milliGRAM(s) IV Intermittent every 8 hours  insulin lispro (ADMELOG) corrective regimen sliding scale   SubCutaneous every 6 hours  levothyroxine Injectable 40 MICROGram(s) IV Push <User Schedule>  loperamide 4 milliGRAM(s) Oral every 12 hours  losartan 25 milliGRAM(s) Oral daily  metoprolol tartrate 25 milliGRAM(s) Oral every 12 hours  nystatin Powder 1 Application(s) Topical three times a day  pantoprazole  Injectable 40 milliGRAM(s) IV Push daily  venlafaxine XR. 300 milliGRAM(s) Oral daily    MEDICATIONS  (PRN):  benzocaine/menthol Lozenge 2 Lozenge Oral every 4 hours PRN Sore Throat  dextrose Oral Gel 15 Gram(s) Oral once PRN Blood Glucose LESS THAN 70 milliGRAM(s)/deciliter  melatonin 5 milliGRAM(s) Oral at bedtime PRN Sleep      Drips:     ICU Vital Signs Last 24 Hrs  T(C): 37 (28 Aug 2023 09:15), Max: 37 (28 Aug 2023 09:15)  T(F): 98.6 (28 Aug 2023 09:15), Max: 98.6 (28 Aug 2023 09:15)  HR: 78 (28 Aug 2023 09:15) (78 - 84)  BP: 136/63 (28 Aug 2023 08:00) (102/66 - 143/65)  BP(mean): 91 (28 Aug 2023 08:00) (78 - 97)  ABP: --  ABP(mean): --  RR: 18 (28 Aug 2023 09:15) (17 - 30)  SpO2: 90% (28 Aug 2023 09:15) (90% - 100%)    O2 Parameters below as of 28 Aug 2023 08:00  Patient On (Oxygen Delivery Method): room air            Physical Exam:  General: NAD  HEENT: NC/AT, EOMI, PERRLA, normal hearing, no oral lesions, neck supple w/o LAD  Pulmonary: Nonlabored breathing, no respiratory distress, decreased bibasilar lung sounds  Cardiovascular: NSR, no murmurs  Abdominal: soft, NT/ND. Vac in place w/ good suction. Ostomy ppp. EC fistula w/ bilious output. JOYCE serous.  Extremities: WWP, 5/5 strength x 4, no clubbing/cyanosis/edema  Neuro: A/O x3, CNs II-XII grossly intact, normal motor/sensation, no focal deficits  Pulses: palpable distal pulses        I&O's Summary    27 Aug 2023 07:01  -  28 Aug 2023 07:00  --------------------------------------------------------  IN: 3324 mL / OUT: 3670 mL / NET: -346 mL    28 Aug 2023 07:01  -  28 Aug 2023 10:09  --------------------------------------------------------  IN: 245 mL / OUT: 150 mL / NET: 95 mL        LABS:                        9.4    11.21 )-----------( 272      ( 28 Aug 2023 05:17 )             29.5     08-28    134<L>  |  103  |  8   ----------------------------<  97  4.3   |  24  |  0.97    Ca    8.7      28 Aug 2023 05:17  Phos  3.4     08-28  Mg     1.7     08-28    TPro  8.1  /  Alb  2.2<L>  /  TBili  3.8<H>  /  DBili  2.4<H>  /  AST  67<H>  /  ALT  89<H>  /  AlkPhos  198<H>  08-28    PT/INR - ( 28 Aug 2023 05:17 )   PT: 15.5 sec;   INR: 1.37          PTT - ( 28 Aug 2023 05:17 )  PTT:35.0 sec  Urinalysis Basic - ( 28 Aug 2023 05:17 )    Color: x / Appearance: x / SG: x / pH: x  Gluc: 97 mg/dL / Ketone: x  / Bili: x / Urobili: x   Blood: x / Protein: x / Nitrite: x   Leuk Esterase: x / RBC: x / WBC x   Sq Epi: x / Non Sq Epi: x / Bacteria: x      CAPILLARY BLOOD GLUCOSE      POCT Blood Glucose.: 116 mg/dL (28 Aug 2023 06:49)  POCT Blood Glucose.: 107 mg/dL (28 Aug 2023 03:39)  POCT Blood Glucose.: 69 mg/dL (28 Aug 2023 03:01)  POCT Blood Glucose.: 98 mg/dL (27 Aug 2023 22:49)  POCT Blood Glucose.: 98 mg/dL (27 Aug 2023 16:09)  POCT Blood Glucose.: 90 mg/dL (27 Aug 2023 11:26)    LIVER FUNCTIONS - ( 28 Aug 2023 05:17 )  Alb: 2.2 g/dL / Pro: 8.1 g/dL / ALK PHOS: 198 U/L / ALT: 89 U/L / AST: 67 U/L / GGT: x             Cultures:    RADIOLOGY & ADDITIONAL STUDIES:

## 2023-08-29 NOTE — PROGRESS NOTE ADULT - NUTRITIONAL ASSESSMENT
78 M w/ Crohn's, AFib/Flutter s/p DCCVs on amiodarone, remote ileocectomy and open appendectomy. Admitted (6/23) for SBO vs Crohns flare, s/p NGT decompression and s/p lap TRE converted to open TRE, SBR x 3, left in discontinuity with abthera vac on (6/27), RTOR for ileocolic resection, small bowel anastomosis, and abdominal wall closure on (6/28), c/b fluid collection s/p IR aspiration of perihepatic fluid on (7/3), c/b wound dehiscence s/p RTOR exlap, washout, ileocolic resection with end ileostomy, blow hole colostomy, red rubber from ileostomy to small bowel anastomosis; vicryl bridging mesh on (7/5). Went to SICU but extubated 7/6 and SDU 8/2. Has been recovering on telemetry with ECF management and prolonged TPN. Returns to SICU 8/23 with AMS and concern for sepsis.     LRD/Calorie Count  FSG q4hr  D20 IVF for hypoglycemia  Fecal Fat   Holding TPN  Holding octreotide  Home Effexor, Lopressor, Norvasc, Losartan  Holding Amiodarone, Lipitor  IV Synthroid  Imipenem 8/26-8/29  SCD/SQH  ECF drain, Wound Vac, Ostomy appliances  Home Health PT
78M POD8 laparoscopic lysis of adhesions, converted to open lysis of adhesions, SBR x 3, temporary abdominal closure, POD8 ex lap, removal of abthera, ileocolic resection, small bowel anastomosis, POD1 RTOR exlap, washout, ileocolic resection with end ileostomy, blow hole colostomy, fistula, red rubber from ileostomy to small bowel anastomosis; vicryl bridging mesh; R JOYCE below ileostomy, L JOYCE at small bowel enterotomy repair. Off vent doing well. Clean wound.       Plan:     - TPN  - TID dressing change   - Poole removal   - Resume HSQ  - Monitor drain outputs   - Rest of care as per SICU team   - Team 1 will follow        Dispo: SICU
making progress  Rx per SICU team  incentive spirometry

## 2023-08-29 NOTE — PROGRESS NOTE ADULT - SUBJECTIVE AND OBJECTIVE BOX
SUBJECTIVE: Pt seen and examined at bedside with chief. Diet was advanced yesterday and he tolerated well, with calorie count started.     MEDICATIONS  (STANDING):  amLODIPine   Tablet 10 milliGRAM(s) Oral every 24 hours  chlorhexidine 2% Cloths 1 Application(s) Topical <User Schedule>  chlorhexidine 2% Cloths 1 Application(s) Topical <User Schedule>  cholestyramine Powder (Sugar-Free) 4 Gram(s) Oral two times a day  dextrose 20%. 500 milliLiter(s) (65 mL/Hr) IV Continuous <Continuous>  dextrose 5%. 1000 milliLiter(s) (50 mL/Hr) IV Continuous <Continuous>  dextrose 50% Injectable 25 Gram(s) IV Push once  dextrose 50% Injectable 25 Gram(s) IV Push once  glucagon  Injectable 1 milliGRAM(s) IntraMuscular once  glucagon  Injectable 1 milliGRAM(s) IntraMuscular once  heparin   Injectable 5000 Unit(s) SubCutaneous every 8 hours  imipenem/cilastatin  IVPB 1000 milliGRAM(s) IV Intermittent every 8 hours  insulin lispro (ADMELOG) corrective regimen sliding scale   SubCutaneous every 6 hours  levothyroxine Injectable 40 MICROGram(s) IV Push <User Schedule>  loperamide 4 milliGRAM(s) Oral every 12 hours  losartan 25 milliGRAM(s) Oral daily  metoprolol tartrate 25 milliGRAM(s) Oral every 12 hours  nystatin Powder 1 Application(s) Topical three times a day  pantoprazole  Injectable 40 milliGRAM(s) IV Push daily  venlafaxine XR. 300 milliGRAM(s) Oral daily    MEDICATIONS  (PRN):  benzocaine/menthol Lozenge 2 Lozenge Oral every 4 hours PRN Sore Throat  dextrose Oral Gel 15 Gram(s) Oral once PRN Blood Glucose LESS THAN 70 milliGRAM(s)/deciliter  melatonin 5 milliGRAM(s) Oral at bedtime PRN Sleep      Vital Signs Last 24 Hrs  T(C): 36.3 (29 Aug 2023 05:00), Max: 37 (28 Aug 2023 09:15)  T(F): 97.4 (29 Aug 2023 05:00), Max: 98.6 (28 Aug 2023 09:15)  HR: 80 (29 Aug 2023 04:27) (78 - 84)  BP: 133/89 (29 Aug 2023 04:27) (112/65 - 151/67)  BP(mean): 105 (29 Aug 2023 04:27) (78 - 105)  RR: 17 (29 Aug 2023 04:27) (15 - 22)  SpO2: 96% (29 Aug 2023 04:27) (90% - 100%)    Parameters below as of 29 Aug 2023 04:27  Patient On (Oxygen Delivery Method): room air        PHYSICAL EXAM:      Constitutional: A&Ox3    Respiratory: non labored breathing, no respiratory distress    Cardiovascular: NSR, RRR     Gastrointestinal: Soft ND, NT, Midline VAC in place and functioning well. right stoma p/p/p with stool in bag, drain bag on left with minimal output.     Genitourinary: Voiding     Extremities: (-) edema                  I&O's Detail    28 Aug 2023 07:01  -  29 Aug 2023 07:00  --------------------------------------------------------  IN:    dextrose 20%: 1495 mL    Oral Fluid: 300 mL  Total IN: 1795 mL    OUT:    Bulb (mL): 10 mL    Drain (mL): 30 mL    Drain (mL): 370 mL    Ileostomy (mL): 150 mL    VAC (Vacuum Assisted Closure) System (mL): 150 mL    Voided (mL): 1600 mL  Total OUT: 2310 mL    Total NET: -515 mL          LABS:                        10.2   11.43 )-----------( 297      ( 29 Aug 2023 05:30 )             32.4     08-29    130<L>  |  101  |  9   ----------------------------<  101<H>  see note   |  22  |  0.95    Ca    8.4      29 Aug 2023 05:30  Phos  3.6     08-29  Mg     1.8     08-29    TPro  8.5<H>  /  Alb  2.1<L>  /  TBili  3.6<H>  /  DBili  x   /  AST  see note  /  ALT  see note  /  AlkPhos  209<H>  08-29    PT/INR - ( 28 Aug 2023 05:17 )   PT: 15.5 sec;   INR: 1.37          PTT - ( 28 Aug 2023 05:17 )  PTT:35.0 sec  Urinalysis Basic - ( 29 Aug 2023 05:30 )    Color: x / Appearance: x / SG: x / pH: x  Gluc: 101 mg/dL / Ketone: x  / Bili: x / Urobili: x   Blood: x / Protein: x / Nitrite: x   Leuk Esterase: x / RBC: x / WBC x   Sq Epi: x / Non Sq Epi: x / Bacteria: x        RADIOLOGY & ADDITIONAL STUDIES:

## 2023-08-29 NOTE — PROGRESS NOTE ADULT - ASSESSMENT
7/3, stepped down to telemetry on 7/4. wound dehiscence on 7/5, RTOR ex-lap, washout, ileocolic resection with end ileostomy, blow hole colostomy, red rubber from ileostomy to small bowel anastomosis; Vicryl bridging mesh on 7/5. Transferred to SICU post op for HD monitoring. Extubated 7/6. Surgical wound with decreasing in size and granulating with Vac. Tentative plan for skin graft per Plastics; timing TBD pending proper wound shrinkage and granulation   8/23: Upgraded to SICU 8/23 for acute delirium and tremors, r/o sepsis vs metabolic encephalopathy. Ammonia levels normal    8/25: Spiked temp to 101.3 overnight w/ new leukocytosis to 14   8/28: Downgraded from SICU. No growth from blood cultures. Abx for 5 days per ID     Plan:   - Wound vac change Monday & Friday. Troubleshoot fistula drain   - IV Abx to end today per ID (total 5 days)   - Minced and moist diet   - Plan to replace PICC once blood cultures finalize, and resume TPN   - Continue Loperamide; monitor fistula drain and wound manager output   - PRN pain and nausea control   - Hx of A-fib/flutter; continue rate and rhythm control   - Encourage IS/OOB   - DVT ppx   - PT   - Tentative plan for skin graft per Plastics; timing TBD pending proper wound granulation     D/w chief resident and attending 7/3, stepped down to telemetry on 7/4. wound dehiscence on 7/5, RTOR ex-lap, washout, ileocolic resection with end ileostomy, blow hole colostomy, red rubber from ileostomy to small bowel anastomosis; Vicryl bridging mesh on 7/5. Transferred to SICU post op for HD monitoring. Extubated 7/6. Surgical wound with decreasing in size and granulating with Vac. Tentative plan for skin graft per Plastics; timing TBD pending proper wound shrinkage and granulation   8/23: Upgraded to SICU 8/23 for acute delirium and tremors, r/o sepsis vs metabolic encephalopathy. Ammonia levels normal    8/25: Spiked temp to 101.3 overnight w/ new leukocytosis to 14   8/28: Downgraded from SICU. No growth from blood cultures. Abx for 5 days per ID     Plan:   - Wound vac change Monday & Friday. Troubleshoot fistula drain   - IV Abx to end today per ID (total 5 days)   - Minced and moist diet    - Continue Loperamide; monitor fistula drain and wound manager output   - PRN pain and nausea control   - Hx of A-fib/flutter; continue rate and rhythm control   - Encourage IS/OOB   - DVT ppx   - PT   - Tentative plan for skin graft per Plastics; timing TBD pending proper wound granulation     D/w chief resident and attending

## 2023-08-29 NOTE — PROGRESS NOTE ADULT - SUBJECTIVE AND OBJECTIVE BOX
Patient seen and examined at bedside.    Overnight Events: No acute events, remains in junctional rhythm.     Review Of Systems:   CONSTITUTIONAL: No weakness, fevers or chills  EYES/ENT: No visual changes;  No dysphagia  NECK: No pain or stiffness  RESPIRATORY: No cough, wheezing, hemoptysis  CARDIOVASCULAR: No chest pain or palpitations; No lower extremity edema  GASTROINTESTINAL: No abdominal or epigastric pain. No nausea, vomiting, or hematemesis; No diarrhea or constipation. No melena or hematochezia.  BACK: No back pain  GENITOURINARY: No dysuria, frequency or hematuria  NEUROLOGICAL: No numbness or weakness  SKIN: No itching, burning, rashes, or lesions   All other review of systems is negative unless indicated above.          Current Meds:  amLODIPine   Tablet 10 milliGRAM(s) Oral every 24 hours  benzocaine/menthol Lozenge 2 Lozenge Oral every 4 hours PRN  chlorhexidine 2% Cloths 1 Application(s) Topical <User Schedule>  chlorhexidine 2% Cloths 1 Application(s) Topical <User Schedule>  cholestyramine Powder (Sugar-Free) 4 Gram(s) Oral two times a day  dextrose 20%. 500 milliLiter(s) IV Continuous <Continuous>  dextrose 5%. 1000 milliLiter(s) IV Continuous <Continuous>  dextrose 50% Injectable 25 Gram(s) IV Push once  dextrose 50% Injectable 25 Gram(s) IV Push once  dextrose Oral Gel 15 Gram(s) Oral once PRN  glucagon  Injectable 1 milliGRAM(s) IntraMuscular once  glucagon  Injectable 1 milliGRAM(s) IntraMuscular once  heparin   Injectable 5000 Unit(s) SubCutaneous every 8 hours  imipenem/cilastatin  IVPB 1000 milliGRAM(s) IV Intermittent every 8 hours  insulin lispro (ADMELOG) corrective regimen sliding scale   SubCutaneous every 6 hours  levothyroxine Injectable 40 MICROGram(s) IV Push <User Schedule>  loperamide 4 milliGRAM(s) Oral every 12 hours  losartan 25 milliGRAM(s) Oral daily  magnesium sulfate  IVPB 1 Gram(s) IV Intermittent once  melatonin 5 milliGRAM(s) Oral at bedtime PRN  metoprolol tartrate 25 milliGRAM(s) Oral every 12 hours  nystatin Powder 1 Application(s) Topical three times a day  pantoprazole  Injectable 40 milliGRAM(s) IV Push daily  venlafaxine XR. 300 milliGRAM(s) Oral daily      Vitals:  T(F): 97.4 (08-29), Max: 98.6 (08-28)  HR: 84 (08-29) (80 - 84)  BP: 140/65 (08-29) (112/65 - 140/65)  RR: 18 (08-29)  SpO2: 96% (08-29)  I&O's Summary    28 Aug 2023 07:01  -  29 Aug 2023 07:00  --------------------------------------------------------  IN: 1795 mL / OUT: 2310 mL / NET: -515 mL    29 Aug 2023 07:01  -  29 Aug 2023 10:32  --------------------------------------------------------  IN: 0 mL / OUT: 75 mL / NET: -75 mL        Physical Exam:  Appearance: No acute distress; well appearing  Eyes: PERRL, EOMI, pink conjunctiva  HEENT: Normal oral mucosa  Cardiovascular: RRR, S1, S2, no murmurs, rubs, or gallops; no edema; no JVD  Respiratory: Clear to auscultation bilaterally  Gastrointestinal: soft, non-tender, non-distended with normal bowel sounds  Musculoskeletal: No clubbing; no joint deformity   Neurologic: Non-focal  Lymphatic: No lymphadenopathy  Psychiatry: AAOx3, mood & affect appropriate  Skin: No rashes, ecchymoses, or cyanosis                          10.2   11.43 )-----------( 297      ( 29 Aug 2023 05:30 )             32.4     08-29    130<L>  |  101  |  9   ----------------------------<  101<H>  see note   |  22  |  0.95    Ca    8.4      29 Aug 2023 05:30  Phos  3.6     08-29  Mg     1.8     08-29    TPro  8.5<H>  /  Alb  2.1<L>  /  TBili  3.6<H>  /  DBili  x   /  AST  see note  /  ALT  see note  /  AlkPhos  209<H>  08-29    PT/INR - ( 28 Aug 2023 05:17 )   PT: 15.5 sec;   INR: 1.37          PTT - ( 28 Aug 2023 05:17 )  PTT:35.0 sec

## 2023-08-29 NOTE — PROGRESS NOTE ADULT - ASSESSMENT
79 yo M w/ h/o Crohn's, AFib/Flutter s/p multiple DCCVs on amio, with a complicated and prolonged abdominal surgical history including open TRE, SBR x 3, RTOR x2 for exlap further cb high output fistula from abd wound, subsequently requiring TPN via PICC for nutritional support since 6/30, was eventually downgraded but now returns to SICU for further management of sepsis ISO line+/- abdominal source, now with encephalopathy. EP consulted regarding junctional rhythm and elevated LFTs.     - LFTs remain abnormal, agree with continuing to hold amiodarone   - remains in what appears to be accelerated junctional at 80 beats per minute, consistent over 24 hours, no indication for intervention at this time  - may continue metoprolol  - EP will continue to follow

## 2023-08-30 NOTE — PROGRESS NOTE ADULT - SUBJECTIVE AND OBJECTIVE BOX
STATUS POST:  6/27: Laparoscopic lysis of adhesions, converted to open lysis of adhesions, SBR x 3, temporary abdominal closure, 5L cryst, 1L 5% alb, 3 pRBC, 1 FFP, 1 plts  6/28: ex lap, removal of abthera, ileocolic resection, small bowel anastamosis, , 1.6 crystalloid, 250 5%, 175 uop   7/3: IR Gendel drained perihepatic aspiration of serous fluid.   7/5: RTOR exlap, washout, ileocolic resection with end ileostomy, blow hole colostomy, fistula, red rubber from ileostomy to small bowel anastomosis; vicryl bridging mesh; R JOYCE below ileostomy, L JOYCE at small bowel enterotomy repair; 500 LR, 500 5% albumin, 3u PRBC, 2 FFP, 400 UOP,      ON: BCx NG@5d    SUBJECTIVE: Patient seen and examined bedside by chief resident, patient c/o poor ability to sleep. No other complaints. Tiffanie diet, -n/-v. Denies sob/ross/dizziness    amLODIPine   Tablet 10 milliGRAM(s) Oral every 24 hours  heparin   Injectable 5000 Unit(s) SubCutaneous every 8 hours  losartan 25 milliGRAM(s) Oral daily  metoprolol tartrate 25 milliGRAM(s) Oral every 12 hours      Vital Signs Last 24 Hrs  T(C): 36.6 (30 Aug 2023 05:00), Max: 36.8 (29 Aug 2023 21:45)  T(F): 97.8 (30 Aug 2023 05:00), Max: 98.3 (29 Aug 2023 21:45)  HR: 84 (30 Aug 2023 05:07) (82 - 84)  BP: 120/58 (30 Aug 2023 05:07) (111/55 - 141/64)  BP(mean): 84 (30 Aug 2023 05:07) (82 - 94)  RR: 17 (30 Aug 2023 05:07) (17 - 18)  SpO2: 97% (30 Aug 2023 05:07) (90% - 100%)    Parameters below as of 30 Aug 2023 05:07  Patient On (Oxygen Delivery Method): room air      I&O's Detail    29 Aug 2023 07:01  -  30 Aug 2023 07:00  --------------------------------------------------------  IN:    dextrose 20%: 650 mL    Oral Fluid: 300 mL  Total IN: 950 mL    OUT:    Bulb (mL): 0 mL    Drain (mL): 1000 mL    Drain (mL): 0 mL    Ileostomy (mL): 250 mL    VAC (Vacuum Assisted Closure) System (mL): 475 mL    Voided (mL): 1300 mL  Total OUT: 3025 mL    Total NET: -2075 mL          General: NAD, resting comfortably in bed  C/V: NSR  Pulm: Nonlabored breathing, no respiratory distress  Abd: Soft ND, NT, Midline VAC in place and functioning well. right stoma p/p/p with stool in bag, drain bag on left with minimal output.   Extrem: WWP, no edema, SCDs in place        LABS:                        10.2   11.43 )-----------( 297      ( 29 Aug 2023 05:30 )             32.4     08-29    132<L>  |  99  |  9   ----------------------------<  114<H>  4.2   |  25  |  0.92    Ca    9.0      29 Aug 2023 10:00  Phos  3.6     08-29  Mg     1.8     08-29    TPro  8.9<H>  /  Alb  2.4<L>  /  TBili  3.6<H>  /  DBili  x   /  AST  68<H>  /  ALT  78<H>  /  AlkPhos  224<H>  08-29      Urinalysis Basic - ( 29 Aug 2023 10:00 )    Color: x / Appearance: x / SG: x / pH: x  Gluc: 114 mg/dL / Ketone: x  / Bili: x / Urobili: x   Blood: x / Protein: x / Nitrite: x   Leuk Esterase: x / RBC: x / WBC x   Sq Epi: x / Non Sq Epi: x / Bacteria: x        RADIOLOGY & ADDITIONAL STUDIES:

## 2023-08-30 NOTE — PROGRESS NOTE ADULT - ATTENDING COMMENTS
[employer] Attending Coverage for Dr Peterson  Total output from ileostomy and wound manager/fistula was 1L for 24hrs. Previously under 1 L since starting LRD. Previously on TPN  Wound manager leaked overnight, now resolved with local wound care.  Continue diet, trend output volume and consistency.  On imodium - increase to max dose.  Previously on octreotide, currently held.  Continue supportive care.

## 2023-08-30 NOTE — CHART NOTE - NSCHARTNOTEFT_GEN_A_CORE
Admitting Diagnosis:   Patient is a 78y old  Male who presents with a chief complaint of sbo (07 Aug 2023 14:14)      PAST MEDICAL & SURGICAL HISTORY:  Essential hypertension  Hypertension      Atrial fibrillation  s/p cardioversion 2010 and 2014  Pt. reports 4 DCCV  Now on Amiodarone 200mg PO bid and Eliquis 5mg PO bid  Last DCCV 4 yrs ago at [facility]      Crohn's disease  s/p partial resection of ileum      Hyperlipidemia      Hypothyroidism      History of depression  On Venlafaxine ER 150mg PO bid      H/O knee surgery      History of cataract surgery          Current Nutrition Order: Low fiber    PO Intake: Good (%) [   ]  Fair (50-75%) [   ] Poor (<25%) [   ]    GI Issues:     Pain: no pain/discomfort noted    Skin Integrity: R heel DTI, LUQ JOYCE, mid abd wound vac, EC fistula, ileostomy    Labs:   08-30    131<L>  |  101  |  8   ----------------------------<  108<H>  3.6   |  23  |  0.98    Ca    8.6      30 Aug 2023 05:30  Phos  3.3     08-30  Mg     1.7     08-30    TPro  7.7  /  Alb  2.2<L>  /  TBili  3.4<H>  /  DBili  x   /  AST  61<H>  /  ALT  59<H>  /  AlkPhos  199<H>  08-30      CAPILLARY BLOOD GLUCOSE  CAPILLARY BLOOD GLUCOSE      POCT Blood Glucose.: 101 mg/dL (30 Aug 2023 11:23)  POCT Blood Glucose.: 92 mg/dL (30 Aug 2023 06:01)  POCT Blood Glucose.: 88 mg/dL (30 Aug 2023 00:10)  POCT Blood Glucose.: 93 mg/dL (29 Aug 2023 22:10)  POCT Blood Glucose.: 98 mg/dL (29 Aug 2023 17:22)        Medications:  MEDICATIONS  (STANDING):  amLODIPine   Tablet 10 milliGRAM(s) Oral every 24 hours  chlorhexidine 2% Cloths 1 Application(s) Topical <User Schedule>  chlorhexidine 2% Cloths 1 Application(s) Topical <User Schedule>  cholestyramine Powder (Sugar-Free) 4 Gram(s) Oral two times a day  dextrose 20%. 500 milliLiter(s) (65 mL/Hr) IV Continuous <Continuous>  dextrose 5%. 1000 milliLiter(s) (50 mL/Hr) IV Continuous <Continuous>  dextrose 50% Injectable 25 Gram(s) IV Push once  dextrose 50% Injectable 25 Gram(s) IV Push once  glucagon  Injectable 1 milliGRAM(s) IntraMuscular once  glucagon  Injectable 1 milliGRAM(s) IntraMuscular once  heparin   Injectable 5000 Unit(s) SubCutaneous every 8 hours  insulin lispro (ADMELOG) corrective regimen sliding scale   SubCutaneous every 6 hours  levothyroxine Injectable 40 MICROGram(s) IV Push <User Schedule>  loperamide 4 milliGRAM(s) Oral every 8 hours  losartan 25 milliGRAM(s) Oral daily  metoprolol tartrate 25 milliGRAM(s) Oral every 12 hours  nystatin Powder 1 Application(s) Topical three times a day  pantoprazole  Injectable 40 milliGRAM(s) IV Push daily  venlafaxine XR. 300 milliGRAM(s) Oral daily    MEDICATIONS  (PRN):  benzocaine/menthol Lozenge 2 Lozenge Oral every 4 hours PRN Sore Throat  dextrose Oral Gel 15 Gram(s) Oral once PRN Blood Glucose LESS THAN 70 milliGRAM(s)/deciliter  melatonin 5 milliGRAM(s) Oral at bedtime PRN Sleep      Weight: 101kg [9 Aug 2023]  Daily   102.1kg [10 July 2023]  Daily 99.9kg [9 July 2023]    Weight Change: minor wt fluctuations throughout admission. Recommend weekly weights for trending/monitoring adequacy of nutrition provision    Estimated energy needs:   Ideal body weight (86.4kg) used for calculations as pt >100% of IBW, BMI <30 per [facility] Standards of Care. Needs estimated for age and adjusted for current clinical status, increased needs for post-op & open abd wound healing    Calories: 8880-9615 kcals based on 30-35 kcals/kg  Protein: 172.8-216 g protein based on 2.0-2.5g protein/kg  *Fluid needs per team    Subjective:   78M w/ Crohn's, AFib/Flutter s/p DCCVs on amiodarone, remote ileocectomy and open appy here for SBO vs Crohns flare, s/p NGT decompression and now s/p lap TRE converted to open TRE, SBR x 3, left in discontinuity with abthera vac on 6/27, RTOR for ileocolic resection, small bowel anastomosis, and abdominal wall closure on 6/28, and s/p IR aspiration of perihepatic fluid on 7/3, stepped down to telemetry on 7/4. wound dehiscence on 7/5, RTOR ex-lap, washout, ileocolic resection with end ileostomy, blow hole colostomy, red rubber from ileostomy to small bowel anastomosis; Vicryl bridging mesh on 7/5. Transferred to SICU post op for HD monitoring. Extubated 7/6 and SDU 8/2. Has been recovering on telemetry with ECF management and prolonged TPN. Returns to SICU 8/23 with AMS and concern for sepsis now back SDU.       TPN stopped 8/23    Previous Nutrition Diagnosis:  Increased Nutrient Needs R/T physiological demands for nutrient AEB post-op wound healing    Active [  X ]  Resolved [   ]    Goal:  Pt will meet at least 75% of protein & energy needs via most appropriate route for nutrition     Recommendations:  1. Continue Low fiber diet  2. Follow intake closely, adjust prn  3. Monitor electrolytes, adjust and replete prn  4. Pain and bowel regimen per team   5. RD diet edu prn  6. Recommend weekly weights for trending  7. Monitor clinical course & adjust recommendations prn    Education:     Risk Level: High [  X ] Moderate [   ] Low [   ]. Admitting Diagnosis:   Patient is a 78y old  Male who presents with a chief complaint of sbo (07 Aug 2023 14:14)      PAST MEDICAL & SURGICAL HISTORY:  Essential hypertension  Hypertension      Atrial fibrillation  s/p cardioversion 2010 and 2014  Pt. reports 4 DCCV  Now on Amiodarone 200mg PO bid and Eliquis 5mg PO bid  Last DCCV 4 yrs ago at [facility]      Crohn's disease  s/p partial resection of ileum      Hyperlipidemia      Hypothyroidism      History of depression  On Venlafaxine ER 150mg PO bid      H/O knee surgery      History of cataract surgery          Current Nutrition Order: Low fiber    PO Intake: Good (%) [   ]  Fair (50-75%) [   ] Poor (<25%) [   ]    GI Issues: No N/V, ileostomy in place, bowel regimen ordered    Pain: no pain/discomfort noted    Skin Integrity: R heel DTI, LUQ JOYCE, mid abd wound vac, EC fistula, ileostomy    Labs:   08-30    131<L>  |  101  |  8   ----------------------------<  108<H>  3.6   |  23  |  0.98    Ca    8.6      30 Aug 2023 05:30  Phos  3.3     08-30  Mg     1.7     08-30    TPro  7.7  /  Alb  2.2<L>  /  TBili  3.4<H>  /  DBili  x   /  AST  61<H>  /  ALT  59<H>  /  AlkPhos  199<H>  08-30      CAPILLARY BLOOD GLUCOSE  CAPILLARY BLOOD GLUCOSE      POCT Blood Glucose.: 101 mg/dL (30 Aug 2023 11:23)  POCT Blood Glucose.: 92 mg/dL (30 Aug 2023 06:01)  POCT Blood Glucose.: 88 mg/dL (30 Aug 2023 00:10)  POCT Blood Glucose.: 93 mg/dL (29 Aug 2023 22:10)  POCT Blood Glucose.: 98 mg/dL (29 Aug 2023 17:22)        Medications:  MEDICATIONS  (STANDING):  amLODIPine   Tablet 10 milliGRAM(s) Oral every 24 hours  chlorhexidine 2% Cloths 1 Application(s) Topical <User Schedule>  chlorhexidine 2% Cloths 1 Application(s) Topical <User Schedule>  cholestyramine Powder (Sugar-Free) 4 Gram(s) Oral two times a day  dextrose 20%. 500 milliLiter(s) (65 mL/Hr) IV Continuous <Continuous>  dextrose 5%. 1000 milliLiter(s) (50 mL/Hr) IV Continuous <Continuous>  dextrose 50% Injectable 25 Gram(s) IV Push once  dextrose 50% Injectable 25 Gram(s) IV Push once  glucagon  Injectable 1 milliGRAM(s) IntraMuscular once  glucagon  Injectable 1 milliGRAM(s) IntraMuscular once  heparin   Injectable 5000 Unit(s) SubCutaneous every 8 hours  insulin lispro (ADMELOG) corrective regimen sliding scale   SubCutaneous every 6 hours  levothyroxine Injectable 40 MICROGram(s) IV Push <User Schedule>  loperamide 4 milliGRAM(s) Oral every 8 hours  losartan 25 milliGRAM(s) Oral daily  metoprolol tartrate 25 milliGRAM(s) Oral every 12 hours  nystatin Powder 1 Application(s) Topical three times a day  pantoprazole  Injectable 40 milliGRAM(s) IV Push daily  venlafaxine XR. 300 milliGRAM(s) Oral daily    MEDICATIONS  (PRN):  benzocaine/menthol Lozenge 2 Lozenge Oral every 4 hours PRN Sore Throat  dextrose Oral Gel 15 Gram(s) Oral once PRN Blood Glucose LESS THAN 70 milliGRAM(s)/deciliter  melatonin 5 milliGRAM(s) Oral at bedtime PRN Sleep      Weight: 101kg [9 Aug 2023]  Daily   102.1kg [10 July 2023]  Daily 99.9kg [9 July 2023]    Weight Change: minor wt fluctuations throughout admission. Recommend weekly weights for trending/monitoring adequacy of nutrition provision    Estimated energy needs:   Ideal body weight (86.4kg) used for calculations as pt >100% of IBW, BMI <30 per [facility] Standards of Care. Needs estimated for age and adjusted for current clinical status, increased needs for post-op & open abd wound healing    Calories: 6377-5142 kcals based on 30-35 kcals/kg  Protein: 172.8-216 g protein based on 2.0-2.5g protein/kg  *Fluid needs per team    Subjective:   78M w/ Crohn's, AFib/Flutter s/p DCCVs on amiodarone, remote ileocectomy and open appy here for SBO vs Crohns flare, s/p NGT decompression and now s/p lap TRE converted to open TRE, SBR x 3, left in discontinuity with abthera vac on 6/27, RTOR for ileocolic resection, small bowel anastomosis, and abdominal wall closure on 6/28, and s/p IR aspiration of perihepatic fluid on 7/3, stepped down to telemetry on 7/4. wound dehiscence on 7/5, RTOR ex-lap, washout, ileocolic resection with end ileostomy, blow hole colostomy, red rubber from ileostomy to small bowel anastomosis; Vicryl bridging mesh on 7/5. Transferred to SICU post op for HD monitoring. Extubated 7/6 and SDU 8/2. Has been recovering on telemetry with ECF management and prolonged TPN. Returns to SICU 8/23 with AMS and concern for sepsis now back SDU.     Pt assessed at bedside. Continues on low fiber diet. TPN discontinued 8/23. Calorie counts reviewed. Pt with fair-good PO intake, improving daily. Likely meeting Pt will meet at least >75% EER at this time. Encouraged nutrient dense foods, trialing ensure clear supplement to support PO intake. RD to follow, recommendations below.     Previous Nutrition Diagnosis:  Increased Nutrient Needs R/T physiological demands for nutrient AEB post-op wound healing    Active [  X ]  Resolved [   ]    Goal:  Pt will meet at least 75% of protein & energy needs via most appropriate route for nutrition     Recommendations:  1. Continue Low fiber diet  --Recommend add ensure clear TID  2. Follow intake closely, adjust prn  3. Monitor electrolytes, adjust and replete prn  4. Pain and bowel regimen per team   5. RD diet edu prn  6. Recommend weekly weights for trending  7. Monitor clinical course & adjust recommendations prn    Education: Reviewed adequate intake parameters.     Risk Level: High [  X ] Moderate [   ] Low [   ].

## 2023-08-30 NOTE — PROGRESS NOTE ADULT - ASSESSMENT
7/3, stepped down to telemetry on 7/4. wound dehiscence on 7/5, RTOR ex-lap, washout, ileocolic resection with end ileostomy, blow hole colostomy, red rubber from ileostomy to small bowel anastomosis; Vicryl bridging mesh on 7/5. Transferred to SICU post op for HD monitoring. Extubated 7/6. Surgical wound with decreasing in size and granulating with Vac. Tentative plan for skin graft per Plastics; timing TBD pending proper wound shrinkage and granulation   8/23: Upgraded to SICU 8/23 for acute delirium and tremors, r/o sepsis vs metabolic encephalopathy. Ammonia levels normal    8/25: Spiked temp to 101.3 overnight w/ new leukocytosis to 14   8/28: Downgraded from SICU. No growth from blood cultures. Abx x 5 days until 8/29 8/30: Leukocytosis resolved     Plan:   - Wound vac change tentatively Monday & Friday. May require more frequent changes due to high output from fistula   - Minced and moist diet    - Continue Loperamide; monitor fistula drain and wound manager output   - PRN pain and nausea control   - Hx of A-fib/flutter; continue rate and rhythm control. Appreciate Cards/EP input   - Encourage IS/OOB   - DVT ppx   - PT   - Tentative plan for skin graft per Plastics; timing TBD pending proper wound granulation     D/w chief resident and attending

## 2023-08-30 NOTE — PROGRESS NOTE ADULT - SUBJECTIVE AND OBJECTIVE BOX
SUBJECTIVE:   Patient seen and examined at bedside this AM   No acute overnight events   Concerned with leakage around fistula surrounding patch   Otherwise no complaints   Voiding spontaneously and having stool per ostomy and fistula       MEDICATIONS  (STANDING):  amLODIPine   Tablet 10 milliGRAM(s) Oral every 24 hours  chlorhexidine 2% Cloths 1 Application(s) Topical <User Schedule>  chlorhexidine 2% Cloths 1 Application(s) Topical <User Schedule>  cholestyramine Powder (Sugar-Free) 4 Gram(s) Oral two times a day  dextrose 20%. 500 milliLiter(s) (65 mL/Hr) IV Continuous <Continuous>  dextrose 5%. 1000 milliLiter(s) (50 mL/Hr) IV Continuous <Continuous>  dextrose 50% Injectable 25 Gram(s) IV Push once  dextrose 50% Injectable 25 Gram(s) IV Push once  glucagon  Injectable 1 milliGRAM(s) IntraMuscular once  glucagon  Injectable 1 milliGRAM(s) IntraMuscular once  heparin   Injectable 5000 Unit(s) SubCutaneous every 8 hours  insulin lispro (ADMELOG) corrective regimen sliding scale   SubCutaneous every 6 hours  levothyroxine Injectable 40 MICROGram(s) IV Push <User Schedule>  loperamide 4 milliGRAM(s) Oral every 8 hours  losartan 25 milliGRAM(s) Oral daily  metoprolol tartrate 25 milliGRAM(s) Oral every 12 hours  nystatin Powder 1 Application(s) Topical three times a day  pantoprazole  Injectable 40 milliGRAM(s) IV Push daily  venlafaxine XR. 300 milliGRAM(s) Oral daily    MEDICATIONS  (PRN):  benzocaine/menthol Lozenge 2 Lozenge Oral every 4 hours PRN Sore Throat  dextrose Oral Gel 15 Gram(s) Oral once PRN Blood Glucose LESS THAN 70 milliGRAM(s)/deciliter  melatonin 5 milliGRAM(s) Oral at bedtime PRN Sleep      Vital Signs Last 24 Hrs  T(C): 36.2 (30 Aug 2023 09:11), Max: 36.8 (29 Aug 2023 21:45)  T(F): 97.2 (30 Aug 2023 09:11), Max: 98.3 (29 Aug 2023 21:45)  HR: 85 (30 Aug 2023 15:00) (80 - 85)  BP: 125/62 (30 Aug 2023 15:00) (111/55 - 141/63)  BP(mean): 79 (30 Aug 2023 12:00) (79 - 91)  RR: 18 (30 Aug 2023 15:00) (16 - 18)  SpO2: 100% (30 Aug 2023 15:00) (90% - 100%)    Parameters below as of 30 Aug 2023 15:00  Patient On (Oxygen Delivery Method): room air        Physical Exam:  General: NAD, resting comfortably in bed  C/V: NSR  Pulm: Nonlabored breathing, no respiratory distress  Abd: soft, wound vac in place but obstructed/no suction, Poole drain to LIS in fistula (0/0 cc) with surrounding wound manager to gravity (550/1000), JOYCE drain in left abdomen with serous output, ostomy pink, patent, and productive of loose brown stool  Extrem: WWP, no edema, SCDs in place   Neuro: A&Ox3; no focal deficits       I&O's Summary    29 Aug 2023 07:01  -  30 Aug 2023 07:00  --------------------------------------------------------  IN: 950 mL / OUT: 3025 mL / NET: -2075 mL    30 Aug 2023 07:01  -  30 Aug 2023 16:44  --------------------------------------------------------  IN: 0 mL / OUT: 1000 mL / NET: -1000 mL        LABS:                        9.5    9.91  )-----------( 279      ( 30 Aug 2023 05:30 )             29.8     08-30    131<L>  |  101  |  8   ----------------------------<  108<H>  3.6   |  23  |  0.98    Ca    8.6      30 Aug 2023 05:30  Phos  3.3     08-30  Mg     1.7     08-30    TPro  7.7  /  Alb  2.2<L>  /  TBili  3.4<H>  /  DBili  x   /  AST  61<H>  /  ALT  59<H>  /  AlkPhos  199<H>  08-30      Urinalysis Basic - ( 30 Aug 2023 05:30 )    Color: x / Appearance: x / SG: x / pH: x  Gluc: 108 mg/dL / Ketone: x  / Bili: x / Urobili: x   Blood: x / Protein: x / Nitrite: x   Leuk Esterase: x / RBC: x / WBC x   Sq Epi: x / Non Sq Epi: x / Bacteria: x      CAPILLARY BLOOD GLUCOSE      POCT Blood Glucose.: 101 mg/dL (30 Aug 2023 11:23)  POCT Blood Glucose.: 92 mg/dL (30 Aug 2023 06:01)  POCT Blood Glucose.: 88 mg/dL (30 Aug 2023 00:10)  POCT Blood Glucose.: 93 mg/dL (29 Aug 2023 22:10)  POCT Blood Glucose.: 98 mg/dL (29 Aug 2023 17:22)    LIVER FUNCTIONS - ( 30 Aug 2023 05:30 )  Alb: 2.2 g/dL / Pro: 7.7 g/dL / ALK PHOS: 199 U/L / ALT: 59 U/L / AST: 61 U/L / GGT: x             RADIOLOGY & ADDITIONAL STUDIES:

## 2023-08-31 NOTE — PROGRESS NOTE ADULT - ATTENDING COMMENTS
[employer] Attending Coverage for Dr Peterson  Fistula/ostomy/VAC overall output decreased in volumed on high dose of imodium. Wound manager leaked, per patient less in severity than compared to day prior, able to be fixed with local wound care by staff.  Tolerating diet without nausea or vomiting or pain  Continue higher dose of imodium  Continue to trend gistula/ostomy/VAC overall output  Local wound care with WOCN for VAC/wound manager  If continued leakage or high output while on PO diet, may need to consider TPN/NPO instead.

## 2023-08-31 NOTE — PROGRESS NOTE ADULT - ASSESSMENT
78 M w/ Crohn's, AFib/Flutter s/p DCCVs on amiodarone, remote ileocectomy and open appendectomy. Admitted (6/23) for SBO vs Crohns flare, s/p NGT decompression and s/p lap TRE converted to open TRE, SBR x 3, left in discontinuity with abthera vac on (6/27), RTOR for ileocolic resection, small bowel anastomosis, and abdominal wall closure on (6/28), c/b fluid collection s/p IR aspiration of perihepatic fluid on (7/3), c/b wound dehiscence s/p RTOR exlap, washout, ileocolic resection with end ileostomy, blow hole colostomy, red rubber from ileostomy to small bowel anastomosis; vicryl bridging mesh on (7/5). Went to SICU but extubated 7/6 and SDU 8/2. Has been recovering on telemetry with ECF management and prolonged TPN. Returns to SICU 8/23 with AMS and concern for sepsis now back SDU.     LRD/ IVF  Increase Immodium  Continue CC  Home Effexor, Lopressor, Norvasc, Losartan  Holding Amiodarone, Lipitor  IV Synthroid  Imipenem 8/26-8/29  SCD/SQH  ECF drain, Wound Vac, Ostomy appliances  Home Health PT

## 2023-08-31 NOTE — PROGRESS NOTE ADULT - SUBJECTIVE AND OBJECTIVE BOX
STATUS POST:  6/27: Laparoscopic lysis of adhesions, converted to open lysis of adhesions, SBR x 3, temporary abdominal closure, 5L cryst, 1L 5% alb, 3 pRBC, 1 FFP, 1 plts  6/28: ex lap, removal of abthera, ileocolic resection, small bowel anastamosis, , 1.6 crystalloid, 250 5%, 175 uop   7/3: IR Gendel drained perihepatic aspiration of serous fluid.   7/5: RTOR exlap, washout, ileocolic resection with end ileostomy, blow hole colostomy, fistula, red rubber from ileostomy to small bowel anastomosis; vicryl bridging mesh; R JOYCE below ileostomy, L JOYCE at small bowel enterotomy repair; 500 LR, 500 5% albumin, 3u PRBC, 2 FFP, 400 UOP,       ON: ostomy appliance leaking, nursing staff fixed. L sided pouch leaking, applied gauze and tegiderm to hold leak for evening.       SUBJECTIVE: Patient seen and examined bedside by chief resident, patient complains of some leakage of ostomy. Tolerating diet, -n/-v. Denies sob/ross/dizziness.    amLODIPine   Tablet 10 milliGRAM(s) Oral every 24 hours  heparin   Injectable 5000 Unit(s) SubCutaneous every 8 hours  losartan 25 milliGRAM(s) Oral daily  metoprolol tartrate 25 milliGRAM(s) Oral every 12 hours      Vital Signs Last 24 Hrs  T(C): 36.4 (31 Aug 2023 04:51), Max: 36.4 (30 Aug 2023 18:00)  T(F): 97.5 (31 Aug 2023 04:51), Max: 97.6 (30 Aug 2023 18:00)  HR: 83 (31 Aug 2023 04:30) (80 - 86)  BP: 130/67 (31 Aug 2023 04:30) (102/53 - 130/67)  BP(mean): 90 (31 Aug 2023 04:30) (79 - 90)  RR: 18 (31 Aug 2023 04:30) (15 - 20)  SpO2: 95% (31 Aug 2023 04:30) (95% - 100%)    Parameters below as of 31 Aug 2023 04:30  Patient On (Oxygen Delivery Method): room air      I&O's Detail    30 Aug 2023 07:01  -  31 Aug 2023 07:00  --------------------------------------------------------  IN:    dextrose 20%: 780 mL  Total IN: 780 mL    OUT:    Bulb (mL): 0 mL    Drain (mL): 275 mL    Drain (mL): 0 mL    Ileostomy (mL): 100 mL    VAC (Vacuum Assisted Closure) System (mL): 850 mL    Voided (mL): 1200 mL  Total OUT: 2425 mL    Total NET: -1645 mL          General: NAD, resting comfortably in bed  C/V: NSR  Pulm: Nonlabored breathing, no respiratory distress  Abd: Soft ND, NT, Midline VAC in place and functioning well. right stoma p/p/p with stool in bag, drain bag on left with minimal output.   Extrem: WWP, no edema, SCDs in place        LABS:                        10.4   11.92 )-----------( 303      ( 31 Aug 2023 05:30 )             33.4     08-31    130<L>  |  98  |  12  ----------------------------<  111<H>  3.8   |  20<L>  |  1.12    Ca    8.7      31 Aug 2023 05:30  Phos  4.2     08-31  Mg     1.8     08-31    TPro  8.3  /  Alb  2.2<L>  /  TBili  3.6<H>  /  DBili  x   /  AST  56<H>  /  ALT  53<H>  /  AlkPhos  211<H>  08-31      Urinalysis Basic - ( 31 Aug 2023 05:30 )    Color: x / Appearance: x / SG: x / pH: x  Gluc: 111 mg/dL / Ketone: x  / Bili: x / Urobili: x   Blood: x / Protein: x / Nitrite: x   Leuk Esterase: x / RBC: x / WBC x   Sq Epi: x / Non Sq Epi: x / Bacteria: x        RADIOLOGY & ADDITIONAL STUDIES:

## 2023-09-01 NOTE — CONSULT NOTE ADULT - ASSESSMENT
77 yo M w/ prolonged hospital course, originally admitted for SBO and CD flare, hospital course c/b multiple GI surgeries, wound dehiscences, ileostomy EC fistula. 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 Other differential diagnosis for sudden oliguria include obstruction, embolus disease, renal papillary necrosis but these are less likely     Recommend:  Suggest 500 ml NS bolus x 1. If BP does not improve or no signs of vol overload, give another 500 ml NS bolus  Straight cath to ensure no retention  Check UA, urine lytes, UPCR  Renal US  Ensure BP >120 for adequate renal perfusion  Management of high GI output per primary team 77 yo M w/ prolonged hospital course, originally admitted for SBO and CD flare, hospital course c/b multiple GI surgeries, wound dehiscences, ileostomy EC fistula. 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 Other differential diagnosis for sudden oliguria include obstruction, embolus disease, renal papillary necrosis but these are less likely     Recommend:  Suggest 500 ml NS bolus x 1. Repeat 500 ml NS boluses till UO improves or patient develops signs of vol overload  Straight cath to ensure no retention  Check UA, urine lytes, UPCR  Renal US  Ensure BP >120 for adequate renal perfusion  Management of high GI output per primary team

## 2023-09-01 NOTE — PROGRESS NOTE ADULT - SUBJECTIVE AND OBJECTIVE BOX
INTERVAL HPI/OVERNIGHT EVENTS : Dc'ed D20% and started D5 1/2 NS mIVF.         SUBJECTIVE:Patient seen and examined bedside by chief resident, patient complains of some leakage of ostomy. Tolerating diet, -n/-v. Denies sob/ross/dizziness.      MEDICATIONS  (STANDING):  amLODIPine   Tablet 10 milliGRAM(s) Oral every 24 hours  chlorhexidine 2% Cloths 1 Application(s) Topical <User Schedule>  chlorhexidine 2% Cloths 1 Application(s) Topical <User Schedule>  cholestyramine Powder (Sugar-Free) 4 Gram(s) Oral two times a day  dextrose 5% + sodium chloride 0.45%. 1000 milliLiter(s) (120 mL/Hr) IV Continuous <Continuous>  dextrose 5%. 1000 milliLiter(s) (50 mL/Hr) IV Continuous <Continuous>  dextrose 50% Injectable 25 Gram(s) IV Push once  dextrose 50% Injectable 25 Gram(s) IV Push once  glucagon  Injectable 1 milliGRAM(s) IntraMuscular once  glucagon  Injectable 1 milliGRAM(s) IntraMuscular once  heparin   Injectable 5000 Unit(s) SubCutaneous every 8 hours  insulin lispro (ADMELOG) corrective regimen sliding scale   SubCutaneous every 6 hours  levothyroxine Injectable 40 MICROGram(s) IV Push <User Schedule>  loperamide 4 milliGRAM(s) Oral every 6 hours  losartan 25 milliGRAM(s) Oral daily  metoprolol tartrate 25 milliGRAM(s) Oral every 12 hours  nystatin Powder 1 Application(s) Topical three times a day  pantoprazole  Injectable 40 milliGRAM(s) IV Push daily  venlafaxine XR. 300 milliGRAM(s) Oral daily    MEDICATIONS  (PRN):  benzocaine/menthol Lozenge 2 Lozenge Oral every 4 hours PRN Sore Throat  dextrose Oral Gel 15 Gram(s) Oral once PRN Blood Glucose LESS THAN 70 milliGRAM(s)/deciliter  melatonin 5 milliGRAM(s) Oral at bedtime PRN Sleep      Vital Signs Last 24 Hrs  T(C): 36.9 (01 Sep 2023 05:02), Max: 36.9 (01 Sep 2023 05:02)  T(F): 98.5 (01 Sep 2023 05:02), Max: 98.5 (01 Sep 2023 05:02)  HR: 82 (01 Sep 2023 04:30) (82 - 84)  BP: 109/60 (01 Sep 2023 04:30) (98/52 - 120/58)  BP(mean): 79 (01 Sep 2023 04:30) (71 - 83)  RR: 16 (01 Sep 2023 04:30) (16 - 18)  SpO2: 96% (01 Sep 2023 04:30) (91% - 98%)    Parameters below as of 01 Sep 2023 04:30  Patient On (Oxygen Delivery Method): room air        PHYSICAL EXAM:  General: NAD, resting comfortably in bed  C/V: NSR  Pulm: Nonlabored breathing, no respiratory distress  Abd: Soft ND, NT, Midline VAC in place and functioning well. right stoma p/p/p with stool in bag, drain bag on left with output leaking from corners.                  I&O's Detail    31 Aug 2023 07:01  -  01 Sep 2023 07:00  --------------------------------------------------------  IN:    dextrose 20%: 910 mL    dextrose 5% + sodium chloride 0.45%: 520 mL    IV PiggyBack: 100 mL    Oral Fluid: 400 mL  Total IN: 1930 mL    OUT:    Bulb (mL): 30 mL    Drain (mL): 0 mL    Drain (mL): 40 mL    Ileostomy (mL): 250 mL    VAC (Vacuum Assisted Closure) System (mL): 1050 mL    Voided (mL): 380 mL  Total OUT: 1750 mL    Total NET: 180 mL          LABS:                        10.5   14.26 )-----------( 316      ( 01 Sep 2023 07:26 )             33.7     09-01    122<L>  |  92<L>  |  21  ----------------------------<  122<H>  4.8   |  19<L>  |  3.08<H>    Ca    8.8      01 Sep 2023 07:26  Phos  4.3     09-01  Mg     2.1     09-01    TPro  8.9<H>  /  Alb  2.4<L>  /  TBili  4.7<H>  /  DBili  x   /  AST  59<H>  /  ALT  52<H>  /  AlkPhos  229<H>  09-01    PT/INR - ( 01 Sep 2023 07:26 )   PT: 15.9 sec;   INR: 1.41            Urinalysis Basic - ( 01 Sep 2023 07:26 )    Color: x / Appearance: x / SG: x / pH: x  Gluc: 122 mg/dL / Ketone: x  / Bili: x / Urobili: x   Blood: x / Protein: x / Nitrite: x   Leuk Esterase: x / RBC: x / WBC x   Sq Epi: x / Non Sq Epi: x / Bacteria: x        RADIOLOGY & ADDITIONAL STUDIES:

## 2023-09-01 NOTE — PROGRESS NOTE ADULT - ATTENDING COMMENTS
[employer] Attending Coverage for Dr Peterson  Seen on rounds with team - wound manager/VAC change performed. Granulation in wound bed, fistula at left upper aspect of wound with a possible secondary track adjacent to it.  Cr elevated today. Renal consulted.  IV fluids, replete outputs  Made NPO due to increase in fistula output since diet was started earlier this week.  PICC placed this AM, TPN to restart today.

## 2023-09-01 NOTE — PROGRESS NOTE ADULT - ASSESSMENT
7/3, stepped down to telemetry on 7/4. wound dehiscence on 7/5, RTOR ex-lap, washout, ileocolic resection with end ileostomy, blow hole colostomy, red rubber from ileostomy to small bowel anastomosis; Vicryl bridging mesh on 7/5. Transferred to SICU post op for HD monitoring. Extubated 7/6. Surgical wound with decreasing in size and granulating with Vac. Tentative plan for skin graft per Plastics; timing TBD pending proper wound shrinkage and granulation   8/23: Upgraded to SICU 8/23 for acute delirium and tremors, r/o sepsis vs metabolic encephalopathy. Ammonia levels normal    8/25: Spiked temp to 101.3 overnight w/ new leukocytosis to 14   8/28: Downgraded from SICU. No growth from blood cultures. Abx x 5 days until 8/29 8/30: Leukocytosis resolved     Plan:   - Wound vac change tentatively Monday & Friday. May require more frequent changes due to high output from fistula   - Minced and moist diet    - Continue Loperamide; monitor fistula drain and wound manager output   - PRN pain and nausea control   - Hx of A-fib/flutter; continue rate and rhythm control. Appreciate Cards/EP input   - Encourage IS/OOB   - DVT ppx   - PT   - Tentative plan for skin graft per Plastics; timing TBD pending proper wound granulation     D/w chief resident and attending7/3, stepped down to telemetry on 7/4. wound dehiscence on 7/5, RTOR ex-lap, washout, ileocolic resection with end ileostomy, blow hole colostomy, red rubber from ileostomy to small bowel anastomosis; Vicryl bridging mesh on 7/5. Transferred to SICU post op for HD monitoring. Extubated 7/6. Surgical wound with decreasing in size and granulating with Vac. Tentative plan for skin graft per Plastics; timing TBD pending proper wound shrinkage and granulation   8/23: Upgraded to SICU 8/23 for acute delirium and tremors, r/o sepsis vs metabolic encephalopathy. Ammonia levels normal    8/25: Spiked temp to 101.3 overnight w/ new leukocytosis to 14   8/28: Downgraded from SICU. No growth from blood cultures. Abx x 5 days until 8/29 8/30: Leukocytosis resolved   9/1: Decreased UOP, soft pressures. Cr 3.08 from 1.12. C/f dehydration 2/2 high fistula output vs ELVI    Plan:   - Send urine lytes   - IV fluids   - Wound vac change tentatively Monday & Friday. Due today   - Continue Loperamide; monitor fistula drain and wound manager output   - PRN pain and nausea control   - Hx of A-fib/flutter; holding Amiodarone. Appreciate Cards/EP input   - Encourage IS/OOB   - DVT ppx   - PT   - Tentative plan for skin graft per Plastics; timing TBD pending proper wound granulation  78M w/ Crohn's, AFib/Flutter s/p DCCVs on amiodarone, remote ileocectomy and open appy here for SBO vs Crohns flare, s/p NGT decompression and now s/p lap TRE converted to open TRE, SBR x 3, left in discontinuity with abthera vac on 6/27, RTOR for ileocolic resection, small bowel anastomosis, and abdominal wall closure on 6/28, and s/p IR aspiration of perihepatic fluid on 7/3, stepped down to telemetry on 7/4. wound dehiscence on 7/5, RTOR ex-lap, washout, ileocolic resection with end ileostomy, blow hole colostomy, red rubber from ileostomy to small bowel anastomosis; Vicryl bridging mesh on 7/5. Transferred to SICU post op for HD monitoring. Extubated 7/6. Surgical wound with decreasing in size and granulating with Vac. Tentative plan for skin graft per Plastics; timing TBD pending proper wound shrinkage and granulation   8/23: Upgraded to SICU 8/23 for acute delirium and tremors, r/o sepsis vs metabolic encephalopathy. Ammonia levels normal    8/25: Spiked temp to 101.3 overnight w/ new leukocytosis to 14   8/28: Downgraded from SICU. No growth from blood cultures. Abx x 5 days until 8/29 8/30: Leukocytosis resolved   9/1: Decreased UOP, soft pressures. Cr 3.08 from 1.12. C/f dehydration 2/2 high fistula output vs ELVI    Plan:   - Send urine lytes   - IV fluids   - Wound vac change tentatively Monday & Friday. Due today   - Continue Loperamide; monitor fistula drain and wound manager output   - PRN pain and nausea control   - Hx of A-fib/flutter; holding Amiodarone. Appreciate Cards/EP input   - Encourage IS/OOB   - DVT ppx   - PT   - Tentative plan for skin graft per Plastics; timing TBD pending proper wound granulation

## 2023-09-01 NOTE — CONSULT NOTE ADULT - SUBJECTIVE AND OBJECTIVE BOX
HPI:  77 yo M w/ prolonged hospital course, originally admitted for SBO and CD flare, hospital course c/b multiple GI surgeries, wound dehiscences, ileostomy EC fistula. Nephrology consulted 9/1 for oliguric ELVI. Pt states his GI output has been high for the last couple of days, also has had some leakage from one of the wound vacs, and his BP has been low yesterday compared to earlier. Denies any other symptoms.      PAST MEDICAL & SURGICAL HISTORY:  Essential hypertension  Hypertension      Atrial fibrillation  s/p cardioversion 2010 and 2014  Pt. reports 4 DCCV  Now on Amiodarone 200mg PO bid and Eliquis 5mg PO bid  Last DCCV 4 yrs ago at [facility]      Crohn's disease  s/p partial resection of ileum      Hyperlipidemia      Hypothyroidism      History of depression  On Venlafaxine ER 150mg PO bid      H/O knee surgery      History of cataract surgery            Allergies:  penicillin (Angioedema)      Home Medications:   allopurinol 300 mg oral tablet: 1 tab(s) orally once a day  amiodarone 200 mg oral tablet: 1 orally once a day  metoprolol tartrate 50 mg oral tablet: 1 orally 2 times a day  rosuvastatin 5 mg oral tablet: 1 tab(s) orally once a day  Synthroid 50 mcg (0.05 mg) oral tablet: 1 tab(s) orally once a day  venlafaxine 150 mg oral tablet, extended release: 1 tab(s) orally 2 times a day      Hospital Medications:   MEDICATIONS  (STANDING):  chlorhexidine 2% Cloths 1 Application(s) Topical <User Schedule>  chlorhexidine 2% Cloths 1 Application(s) Topical <User Schedule>  chlorhexidine 2% Cloths 1 Application(s) Topical <User Schedule>  chlorhexidine 4% Liquid 1 Application(s) Topical <User Schedule>  cholestyramine Powder (Sugar-Free) 4 Gram(s) Oral two times a day  dextrose 5% + sodium chloride 0.9%. 1000 milliLiter(s) (120 mL/Hr) IV Continuous <Continuous>  dextrose 5%. 1000 milliLiter(s) (50 mL/Hr) IV Continuous <Continuous>  dextrose 50% Injectable 25 Gram(s) IV Push once  dextrose 50% Injectable 25 Gram(s) IV Push once  glucagon  Injectable 1 milliGRAM(s) IntraMuscular once  glucagon  Injectable 1 milliGRAM(s) IntraMuscular once  heparin   Injectable 5000 Unit(s) SubCutaneous every 8 hours  insulin lispro (ADMELOG) corrective regimen sliding scale   SubCutaneous every 6 hours  levothyroxine Injectable 40 MICROGram(s) IV Push <User Schedule>  lipid, fat emulsion (Fish Oil and Plant Based) 20% Infusion 0.4999 Gm/kG/Day (20.83 mL/Hr) IV Continuous <Continuous>  loperamide 4 milliGRAM(s) Oral every 6 hours  metoprolol tartrate 25 milliGRAM(s) Oral every 12 hours  nystatin Powder 1 Application(s) Topical three times a day  pantoprazole  Injectable 40 milliGRAM(s) IV Push daily  Parenteral Nutrition - Adult 1 Each (50 mL/Hr) TPN Continuous <Continuous>  venlafaxine XR. 300 milliGRAM(s) Oral daily      SOCIAL HISTORY:  Denies ETOh, Smoking    Family History:  FAMILY HISTORY:        VITALS:  T(F): 97.1 (09-01-23 @ 14:00), Max: 98.5 (09-01-23 @ 05:02)  HR: 82 (09-01-23 @ 04:30)  BP: 109/60 (09-01-23 @ 04:30)  RR: 16 (09-01-23 @ 04:30)  SpO2: 96% (09-01-23 @ 04:30)  Wt(kg): --    08-31 @ 07:01  -  09-01 @ 07:00  --------------------------------------------------------  IN: 1930 mL / OUT: 1750 mL / NET: 180 mL        CAPILLARY BLOOD GLUCOSE      POCT Blood Glucose.: 117 mg/dL (01 Sep 2023 11:15)  POCT Blood Glucose.: 120 mg/dL (01 Sep 2023 06:50)  POCT Blood Glucose.: 146 mg/dL (01 Sep 2023 00:11)  POCT Blood Glucose.: 86 mg/dL (31 Aug 2023 17:42)      Review of Systems:  Negative except as mentioned in HPI    PHYSICAL EXAM:  GENERAL: Alert, awake, NAD   CHEST/LUNG: Bilateral clear breath sounds  HEART: S1, S2, RRR  ABDOMEN: Soft, ileostomy bags  : No mccauley  EXTREMITIES: no edema  Neurology: AAOx3, no focal neurological deficits noted      LABS:  09-01    122<L>  |  92<L>  |  21  ----------------------------<  122<H>  4.8   |  19<L>  |  3.08<H>    Ca    8.8      01 Sep 2023 07:26  Phos  4.3     09-01  Mg     2.1     09-01    TPro  8.9<H>  /  Alb  2.4<L>  /  TBili  4.7<H>  /  DBili      /  AST  59<H>  /  ALT  52<H>  /  AlkPhos  229<H>  09-01    Creatinine Trend: 3.08 <--, 1.12 <--, 0.98 <--, 0.92 <--, 0.95 <--, 0.97 <--, 0.96 <--, 0.87 <--                        10.5   14.26 )-----------( 316      ( 01 Sep 2023 07:26 )             33.7     Urine Studies:  Urinalysis Basic - ( 01 Sep 2023 07:26 )    Color:  / Appearance:  / SG:  / pH:   Gluc: 122 mg/dL / Ketone:   / Bili:  / Urobili:    Blood:  / Protein:  / Nitrite:    Leuk Esterase:  / RBC:  / WBC    Sq Epi:  / Non Sq Epi:  / Bacteria:

## 2023-09-01 NOTE — PROGRESS NOTE ADULT - SUBJECTIVE AND OBJECTIVE BOX
SUBJECTIVE:   Patient seen and examined at bedside this AM   Multiple leaks from Dustin pouch surrounding fistula. Increased wound vac output. Decreased urine output with soft BP.   Concerned with leakage around fistula surrounding patch   Otherwise well appearing       MEDICATIONS  (STANDING):  amLODIPine   Tablet 10 milliGRAM(s) Oral every 24 hours  chlorhexidine 2% Cloths 1 Application(s) Topical <User Schedule>  chlorhexidine 2% Cloths 1 Application(s) Topical <User Schedule>  chlorhexidine 2% Cloths 1 Application(s) Topical <User Schedule>  chlorhexidine 4% Liquid 1 Application(s) Topical <User Schedule>  cholestyramine Powder (Sugar-Free) 4 Gram(s) Oral two times a day  dextrose 5% + sodium chloride 0.9%. 1000 milliLiter(s) (120 mL/Hr) IV Continuous <Continuous>  dextrose 5%. 1000 milliLiter(s) (50 mL/Hr) IV Continuous <Continuous>  dextrose 50% Injectable 25 Gram(s) IV Push once  dextrose 50% Injectable 25 Gram(s) IV Push once  glucagon  Injectable 1 milliGRAM(s) IntraMuscular once  glucagon  Injectable 1 milliGRAM(s) IntraMuscular once  heparin   Injectable 5000 Unit(s) SubCutaneous every 8 hours  insulin lispro (ADMELOG) corrective regimen sliding scale   SubCutaneous every 6 hours  levothyroxine Injectable 40 MICROGram(s) IV Push <User Schedule>  loperamide 4 milliGRAM(s) Oral every 6 hours  losartan 25 milliGRAM(s) Oral daily  metoprolol tartrate 25 milliGRAM(s) Oral every 12 hours  nystatin Powder 1 Application(s) Topical three times a day  pantoprazole  Injectable 40 milliGRAM(s) IV Push daily  venlafaxine XR. 300 milliGRAM(s) Oral daily    MEDICATIONS  (PRN):  benzocaine/menthol Lozenge 2 Lozenge Oral every 4 hours PRN Sore Throat  dextrose Oral Gel 15 Gram(s) Oral once PRN Blood Glucose LESS THAN 70 milliGRAM(s)/deciliter  melatonin 5 milliGRAM(s) Oral at bedtime PRN Sleep  sodium chloride 0.9% lock flush 10 milliLiter(s) IV Push every 1 hour PRN Pre/post blood products, medications, blood draw, and to maintain line patency  sodium chloride 0.9% lock flush 10 milliLiter(s) IV Push every 1 hour PRN Pre/post blood products, medications, blood draw, and to maintain line patency      Vital Signs Last 24 Hrs  T(C): 36.3 (01 Sep 2023 09:00), Max: 36.9 (01 Sep 2023 05:02)  T(F): 97.4 (01 Sep 2023 09:00), Max: 98.5 (01 Sep 2023 05:02)  HR: 82 (01 Sep 2023 04:30) (82 - 84)  BP: 109/60 (01 Sep 2023 04:30) (98/52 - 120/58)  BP(mean): 79 (01 Sep 2023 04:30) (71 - 83)  RR: 16 (01 Sep 2023 04:30) (16 - 18)  SpO2: 96% (01 Sep 2023 04:30) (91% - 98%)    Parameters below as of 01 Sep 2023 04:30  Patient On (Oxygen Delivery Method): room air        Physical Exam:  General: NAD, resting comfortably in bed  C/V: NSR  Pulm: Nonlabored breathing, no respiratory distress  Abd: soft, wound vac in place with adequate seal and suction (250/1050 brown output), Poole drain to LIS in fistula (0/0 cc) with surrounding wound manager to gravity (0/40 + unmeasured leakage), JOYCE drain in left abdomen with serous output (15/30), ostomy pink, patent, and productive of loose brown stool (110/250)  Extrem: WWP, no edema, SCDs in place   Neuro: A&Ox3; no focal deficits       I&O's Summary    31 Aug 2023 07:01  -  01 Sep 2023 07:00  --------------------------------------------------------  IN: 1930 mL / OUT: 1750 mL / NET: 180 mL        LABS:                        10.5   14.26 )-----------( 316      ( 01 Sep 2023 07:26 )             33.7     09-01    122<L>  |  92<L>  |  21  ----------------------------<  122<H>  4.8   |  19<L>  |  3.08<H>    Ca    8.8      01 Sep 2023 07:26  Phos  4.3     09-01  Mg     2.1     09-01    TPro  8.9<H>  /  Alb  2.4<L>  /  TBili  4.7<H>  /  DBili  x   /  AST  59<H>  /  ALT  52<H>  /  AlkPhos  229<H>  09-01    PT/INR - ( 01 Sep 2023 07:26 )   PT: 15.9 sec;   INR: 1.41            Urinalysis Basic - ( 01 Sep 2023 07:26 )    Color: x / Appearance: x / SG: x / pH: x  Gluc: 122 mg/dL / Ketone: x  / Bili: x / Urobili: x   Blood: x / Protein: x / Nitrite: x   Leuk Esterase: x / RBC: x / WBC x   Sq Epi: x / Non Sq Epi: x / Bacteria: x      CAPILLARY BLOOD GLUCOSE      POCT Blood Glucose.: 117 mg/dL (01 Sep 2023 11:15)  POCT Blood Glucose.: 120 mg/dL (01 Sep 2023 06:50)  POCT Blood Glucose.: 146 mg/dL (01 Sep 2023 00:11)  POCT Blood Glucose.: 86 mg/dL (31 Aug 2023 17:42)  POCT Blood Glucose.: 94 mg/dL (31 Aug 2023 13:00)    LIVER FUNCTIONS - ( 01 Sep 2023 07:26 )  Alb: 2.4 g/dL / Pro: 8.9 g/dL / ALK PHOS: 229 U/L / ALT: 52 U/L / AST: 59 U/L / GGT: x             RADIOLOGY & ADDITIONAL STUDIES:

## 2023-09-01 NOTE — CONSULT NOTE ADULT - SUBJECTIVE AND OBJECTIVE BOX
SICU CONSULT NOTE    HPI:   78yMale w/ Crohn's, AFib/Flutter s/p DCCVs on amiodarone, remote ileocectomy and open appy here for SBO vs Crohns flare, s/p NGT decompression and now s/p lap TRE converted to open TRE, SBR x 3, left in discontinuity with abthera vac on , RTOR for ileocolic resection, small bowel anastomosis, and abdominal wall closure on , and s/p IR aspiration of perihepatic fluid on 7/3, stepped down to telemetery on . wound dehisence on , RTOR exlap, washout, ileocolic resection with end ileostomy, blow hole colostomy, red rubber from ileostomy to small bowel anastomosis; vicryl bridging mesh on . prolonged stay in SICU from  to  for management of his complicated abdominal wound. pt had gradually developed a high output fistula from his wound, so he was maintained on on TPN via PICC since  for nutritional support.   pt found to have increase in lethargy and questionable shaking episode today, his PICC was placed on , and he had been getting TPN/lipids through PICC. he had no fevers, no leukocytosis, SBP was borderline at 99/54, MAP 71, non-tachycardic, on scopolamine patch from  for nausea, but no other sedating meds.   he had previously grown  C. tertium, Lactobacillis from his IR cx 7/3, and candida albicans, lactobacillus, vanc sensitive E faecium, vanc resistant E gallinarum, vanc resistant E casseliflavus, lactobacillus from his OR cx . he had completed course of abx with imipenem (-, -)  Dapto (- and -).   Patient transferred to SICU due to concern for sepsis, possibly line sepsis in late August. All Cx at that time noted to have no growth and PICC line subsequently removed. Fevers and AMS resolved and patient stepped back down to telemetry.  Patient continued to tolerate CLD on SDU, but course complicated by high output fistula requiring imodium since yesterday. Cr this AM noted to be >3 from baseline .95 with oliguria. Nephrology consulted and patient stepped back up to SICU for UOP and fluid status monitoring.         PAST MEDICAL & SURGICAL HISTORY:  Essential hypertension  Hypertension      Atrial fibrillation  s/p cardioversion  and   Pt. reports 4 DCCV  Now on Amiodarone 200mg PO bid and Eliquis 5mg PO bid  Last DCCV [facility] yrs ago at [facility]      Crohn's disease  s/p partial resection of ileum      Hyperlipidemia      Hypothyroidism      History of depression  On Venlafaxine ER 150mg PO bid      H/O knee surgery      History of cataract surgery          MEDICATIONS  (STANDING):  chlorhexidine 2% Cloths 1 Application(s) Topical <User Schedule>  chlorhexidine 2% Cloths 1 Application(s) Topical <User Schedule>  chlorhexidine 2% Cloths 1 Application(s) Topical <User Schedule>  chlorhexidine 4% Liquid 1 Application(s) Topical <User Schedule>  cholestyramine Powder (Sugar-Free) 4 Gram(s) Oral two times a day  dextrose 5% + sodium chloride 0.9%. 1000 milliLiter(s) (120 mL/Hr) IV Continuous <Continuous>  dextrose 5%. 1000 milliLiter(s) (50 mL/Hr) IV Continuous <Continuous>  dextrose 50% Injectable 25 Gram(s) IV Push once  dextrose 50% Injectable 25 Gram(s) IV Push once  glucagon  Injectable 1 milliGRAM(s) IntraMuscular once  glucagon  Injectable 1 milliGRAM(s) IntraMuscular once  heparin   Injectable 5000 Unit(s) SubCutaneous every 8 hours  insulin lispro (ADMELOG) corrective regimen sliding scale   SubCutaneous every 6 hours  levothyroxine Injectable 40 MICROGram(s) IV Push <User Schedule>  lipid, fat emulsion (Fish Oil and Plant Based) 20% Infusion 0.4999 Gm/kG/Day (20.83 mL/Hr) IV Continuous <Continuous>  loperamide 4 milliGRAM(s) Oral every 6 hours  metoprolol tartrate 25 milliGRAM(s) Oral every 12 hours  nystatin Powder 1 Application(s) Topical three times a day  pantoprazole  Injectable 40 milliGRAM(s) IV Push daily  Parenteral Nutrition - Adult 1 Each (50 mL/Hr) TPN Continuous <Continuous>  venlafaxine XR. 300 milliGRAM(s) Oral daily    MEDICATIONS  (PRN):  benzocaine/menthol Lozenge 2 Lozenge Oral every 4 hours PRN Sore Throat  dextrose Oral Gel 15 Gram(s) Oral once PRN Blood Glucose LESS THAN 70 milliGRAM(s)/deciliter  melatonin 5 milliGRAM(s) Oral at bedtime PRN Sleep  sodium chloride 0.9% lock flush 10 milliLiter(s) IV Push every 1 hour PRN Pre/post blood products, medications, blood draw, and to maintain line patency  sodium chloride 0.9% lock flush 10 milliLiter(s) IV Push every 1 hour PRN Pre/post blood products, medications, blood draw, and to maintain line patency      Allergies    penicillin (Angioedema)    Intolerances        SOCIAL HISTORY:    FAMILY HISTORY:          Physical Exam:  GENERAL: NAD, resting comfortably in bed, AxOx3, follows commands.   HEENT: NCAT, MMM. Scleral icterus noted  C/V: Normal rate, normal peripheral perfusion, no murmurs  PULM: Nonlabored breathing, no respiratory distress, CTA b/l  ABD: Soft, ND, NT, no rebound tenderness, no guarding, vac in place holding suction with well-sealed pouching and drains putting out serobilious output.   EXTREM: WWP, no edema, SCDs in place  NEURO: No focal deficits      Vital Signs Last 24 Hrs  T(C): 36.4 (01 Sep 2023 17:55), Max: 36.9 (01 Sep 2023 05:02)  T(F): 97.5 (01 Sep 2023 17:55), Max: 98.5 (01 Sep 2023 05:02)  HR: 80 (01 Sep 2023 18:56) (80 - 84)  BP: 141/58 (01 Sep 2023 18:56) (100/56 - 141/58)  BP(mean): 83 (01 Sep 2023 18:56) (74 - 83)  RR: 16 (01 Sep 2023 18:56) (14 - 17)  SpO2: 100% (01 Sep 2023 18:56) (95% - 100%)    Parameters below as of 01 Sep 2023 18:56  Patient On (Oxygen Delivery Method): room air        I&O's Summary    31 Aug 2023 07:01  -  01 Sep 2023 07:00  --------------------------------------------------------  IN: 1930 mL / OUT: 1750 mL / NET: 180 mL    01 Sep 2023 07:01  -  01 Sep 2023 19:38  --------------------------------------------------------  IN: 1960 mL / OUT: 920 mL / NET: 1040 mL            LABS:                        10.5   14.26 )-----------( 316      ( 01 Sep 2023 07:26 )             33.7         122<L>  |  92<L>  |  21  ----------------------------<  122<H>  4.8   |  19<L>  |  3.08<H>    Ca    8.8      01 Sep 2023 07:26  Phos  4.3       Mg     2.1         TPro  8.9<H>  /  Alb  2.4<L>  /  TBili  4.7<H>  /  DBili  x   /  AST  59<H>  /  ALT  52<H>  /  AlkPhos  229<H>  09    PT/INR - ( 01 Sep 2023 07:26 )   PT: 15.9 sec;   INR: 1.41            Urinalysis Basic - ( 01 Sep 2023 18:28 )    Color: Yellow / Appearance: SL Cloudy / S.015 / pH: x  Gluc: x / Ketone: Trace mg/dL  / Bili: Large / Urobili: 1.0 E.U./dL   Blood: x / Protein: 100 mg/dL / Nitrite: POSITIVE   Leuk Esterase: NEGATIVE / RBC: < 5 /HPF / WBC 5-10 /HPF   Sq Epi: x / Non Sq Epi: x / Bacteria: None /HPF      CAPILLARY BLOOD GLUCOSE      POCT Blood Glucose.: 74 mg/dL (01 Sep 2023 17:16)  POCT Blood Glucose.: 117 mg/dL (01 Sep 2023 11:15)  POCT Blood Glucose.: 120 mg/dL (01 Sep 2023 06:50)  POCT Blood Glucose.: 146 mg/dL (01 Sep 2023 00:11)    LIVER FUNCTIONS - ( 01 Sep 2023 07:26 )  Alb: 2.4 g/dL / Pro: 8.9 g/dL / ALK PHOS: 229 U/L / ALT: 52 U/L / AST: 59 U/L / GGT: x             Cultures:      RADIOLOGY & ADDITIONAL STUDIES:      Plan:

## 2023-09-01 NOTE — PROGRESS NOTE ADULT - ASSESSMENT
78 M w/ Crohn's, AFib/Flutter s/p DCCVs on amiodarone, remote ileocectomy and open appendectomy. Admitted (6/23) for SBO vs Crohns flare, s/p NGT decompression and s/p lap TRE converted to open TRE, SBR x 3, left in discontinuity with abthera vac on (6/27), RTOR for ileocolic resection, small bowel anastomosis, and abdominal wall closure on (6/28), c/b fluid collection s/p IR aspiration of perihepatic fluid on (7/3), c/b wound dehiscence s/p RTOR exlap, washout, ileocolic resection with end ileostomy, blow hole colostomy, red rubber from ileostomy to small bowel anastomosis; vicryl bridging mesh on (7/5). Went to SICU but extubated 7/6 and SDU 8/2. Has been recovering on telemetry with ECF management and prolonged TPN. Returned to SICU 8/23 with AMS and concern for sepsis now back SDU. Patient was trialed on oral diet this week 8/28 but has since had increasing output and leakage from vac.    NPO/IVF/TPN starting 9/2  FSG q4hr  Immodium 4mg QID  D51/2NS IVF  Fecal Fat pending  Holding octreotide  Home Effexor, Lopressor, Norvasc, Losartan  Holding Amiodarone, Lipitor  IV Synthroid  SCD/SQH  ECF drain, Wound Vac, Ostomy appliances  Home Health PT

## 2023-09-01 NOTE — CONSULT NOTE ADULT - ASSESSMENT
78 M w/ Crohn's, AFib/Flutter s/p DCCVs on amiodarone, remote ileocectomy and open appendectomy. Admitted (6/23) for SBO vs Crohns flare, s/p NGT decompression and s/p lap TRE converted to open TRE, SBR x 3, left in discontinuity with abthera vac on (6/27), RTOR for ileocolic resection, small bowel anastomosis, and abdominal wall closure on (6/28), c/b fluid collection s/p IR aspiration of perihepatic fluid on (7/3), c/b wound dehiscence s/p RTOR exlap, washout, ileocolic resection with end ileostomy, blow hole colostomy, red rubber from ileostomy to small bowel anastomosis; vicryl bridging mesh on (7/5). Went to SICU but extubated 7/6 and SDU 8/2. Has been recovering on telemetry with ECF management and prolonged TPN. Returns to SICU 8/23 with AMS and concern for sepsis now back SDU. Stepped back to SICU 9/1 for oliguria, severe ELVI and hyponatremia (9/1).     NEURO: A&Ox3. Previous concerns with AMS 2/2 likely anticholingeric effects from scopolamine resolved. Hx of depression - Continue outpatient effexor. Pain control with Dilaudid for vac changes.   CV: Clinically appears hypovolemic with dry mucus membranes. Hx Afib/flutter: s/p DCCV, Off amiodarone given transaminitis and elevated T bili, Hx of AFib - Continue Metoprolol 25 BID. Hx HTN - Holding Norvasc, Losartan. Hx HLD: hold Lipitor given LFTs. TTE  (7/18) - PASP 64mmHg, EF 65-70%,   PULM: No concerns currently. Saturating well on RA. Monitor for fluid overload after resuscitation.  GI/FEN: Previously on CLD with high fistula/wound vac output - started on Immodium 4q6 yesterday. Now NPO with TPN/PICC. Transaminitis of unclear etiology: distended gallbladder on CT 8/25, RUQ US negative. Monitor output from EC fistula and Wound vac daily.  : Severe ELVI (bl Cr 0.95, now >3). Ulytes and UA pending. Poole placed for strict Is and Os. RP U/S pending. Nephrology recommendations appreciated. Hyponatremia: last Na 122 - resuscitated today with NS. F/U STAT BMP  ENDO:  Hx hypothyroid: IV Synthroid, TSH 9 (8/23), recheck in 2-3 weeks. Recurrent Hypoglycemia: on D5LR currently.   ID: Numerous SICU admissions from sepsis, most recently with concern for line sepsis. Now WBC dowtrended. Currently off abx. ////   CTAP (8/25) without fluid collections, distended GB, atelectasis. Restart Imipenem (8/26--).  Previously had C. tertium, Lactobacillis from his IR cx 7/3, and candida albicans, lactobacillus, vanc sensitive E faecium, vanc resistant E gallinarum, vanc resistant E casseliflavus, lactobacillus from his OR cx 7/5. Completed course of abx with imipenem (6/30-7/12, 7/23-7/30), Dapto (6/30-7/5 and 7/23-7/24). been off all abx since 7/30. empiric dapto (8/23-24) and cefepime (8/23-24).   PPX: SCDs, SQH  LINES: PIVs, LUE PICC (9/1 - )  WOUNDS/DRAINS: ECF abdominal wound with vac change Mon/Fri--next on 9/5. PT: Home health PT  DISPO: SICU

## 2023-09-01 NOTE — CHART NOTE - NSCHARTNOTEFT_GEN_A_CORE
Admitting Diagnosis:   Patient is a 78y old  Male who presents with a chief complaint of Surgery (27 Aug 2023 13:44)      PAST MEDICAL & SURGICAL HISTORY:  Essential hypertension  Hypertension      Atrial fibrillation  s/p cardioversion 2010 and 2014  Pt. reports 4 DCCV  Now on Amiodarone 200mg PO bid and Eliquis 5mg PO bid  Last DCCV 4 yrs ago at [facility]      Crohn's disease  s/p partial resection of ileum      Hyperlipidemia      Hypothyroidism      History of depression  On Venlafaxine ER 150mg PO bid      H/O knee surgery      History of cataract surgery          Current Nutrition Order:  NPO    PO Intake: Good (%) [   ]  Fair (50-75%) [   ] Poor (<25%) [   ]- NA NPO     GI Issues: No episodes of nausea or vomiting  See subjective     Pain: Denied complaints of pain     Skin Integrity: Rudy 17, no edema   R heel DTI, LUQ JOYCE, mid abd wound vac, EC fistula draining via red rubber, ileostomy    Labs:   09-01    122<L>  |  92<L>  |  21  ----------------------------<  122<H>  4.8   |  19<L>  |  3.08<H>    Ca    8.8      01 Sep 2023 07:26  Phos  4.3     09-01  Mg     2.1     09-01    TPro  8.9<H>  /  Alb  2.4<L>  /  TBili  4.7<H>  /  DBili  x   /  AST  59<H>  /  ALT  52<H>  /  AlkPhos  229<H>  09-01    CAPILLARY BLOOD GLUCOSE      POCT Blood Glucose.: 74 mg/dL (01 Sep 2023 17:16)  POCT Blood Glucose.: 117 mg/dL (01 Sep 2023 11:15)  POCT Blood Glucose.: 120 mg/dL (01 Sep 2023 06:50)  POCT Blood Glucose.: 146 mg/dL (01 Sep 2023 00:11)      Medications:  MEDICATIONS  (STANDING):  chlorhexidine 2% Cloths 1 Application(s) Topical <User Schedule>  chlorhexidine 2% Cloths 1 Application(s) Topical <User Schedule>  chlorhexidine 2% Cloths 1 Application(s) Topical <User Schedule>  chlorhexidine 4% Liquid 1 Application(s) Topical <User Schedule>  cholestyramine Powder (Sugar-Free) 4 Gram(s) Oral two times a day  dextrose 5% + sodium chloride 0.9%. 1000 milliLiter(s) (120 mL/Hr) IV Continuous <Continuous>  dextrose 5%. 1000 milliLiter(s) (50 mL/Hr) IV Continuous <Continuous>  dextrose 50% Injectable 25 Gram(s) IV Push once  dextrose 50% Injectable 25 Gram(s) IV Push once  glucagon  Injectable 1 milliGRAM(s) IntraMuscular once  glucagon  Injectable 1 milliGRAM(s) IntraMuscular once  heparin   Injectable 5000 Unit(s) SubCutaneous every 8 hours  insulin lispro (ADMELOG) corrective regimen sliding scale   SubCutaneous every 6 hours  levothyroxine Injectable 40 MICROGram(s) IV Push <User Schedule>  lipid, fat emulsion (Fish Oil and Plant Based) 20% Infusion 0.4999 Gm/kG/Day (20.83 mL/Hr) IV Continuous <Continuous>  loperamide 4 milliGRAM(s) Oral every 6 hours  metoprolol tartrate 25 milliGRAM(s) Oral every 12 hours  nystatin Powder 1 Application(s) Topical three times a day  pantoprazole  Injectable 40 milliGRAM(s) IV Push daily  Parenteral Nutrition - Adult 1 Each (50 mL/Hr) TPN Continuous <Continuous>  venlafaxine XR. 300 milliGRAM(s) Oral daily    MEDICATIONS  (PRN):  benzocaine/menthol Lozenge 2 Lozenge Oral every 4 hours PRN Sore Throat  dextrose Oral Gel 15 Gram(s) Oral once PRN Blood Glucose LESS THAN 70 milliGRAM(s)/deciliter  melatonin 5 milliGRAM(s) Oral at bedtime PRN Sleep  sodium chloride 0.9% lock flush 10 milliLiter(s) IV Push every 1 hour PRN Pre/post blood products, medications, blood draw, and to maintain line patency  sodium chloride 0.9% lock flush 10 milliLiter(s) IV Push every 1 hour PRN Pre/post blood products, medications, blood draw, and to maintain line patency      Weight: 101kg [9 Aug 2023]  Daily   102.1kg [10 July 2023]  Daily 99.9kg [9 July 2023]    Weight Change: minor wt fluctuations throughout admission. Recommend weekly weights for trending/monitoring adequacy of nutrition provision    Estimated energy needs:   Ideal body weight (86.4kg) used for calculations as pt >100% of IBW, BMI <30 per [facility] Standards of Care. Needs estimated for age and adjusted for current clinical status, increased needs for post-op & open abd wound healing    Calories: 5171-6062 kcals based on 30-35 kcals/kg  Protein: 172.8-216 g protein based on 2.0-2.5g protein/kg  *Fluid needs per team    Subjective:   78M w/ Crohn's, AFib/Flutter s/p DCCVs on amiodarone, remote ileocectomy and open appy here for SBO vs Crohns flare, s/p NGT decompression and now s/p lap TRE converted to open TRE, SBR x 3, left in discontinuity with abthera vac on 6/27, RTOR for ileocolic resection, small bowel anastomosis, and abdominal wall closure on 6/28, and s/p IR aspiration of perihepatic fluid on 7/3, stepped down to telemetry on 7/4. wound dehiscence on 7/5, RTOR ex-lap, washout, ileocolic resection with end ileostomy, blow hole colostomy, red rubber from ileostomy to small bowel anastomosis; Vicryl bridging mesh on 7/5. Transferred to SICU post op for HD monitoring. Extubated 7/6 and SDU 8/2. Has been recovering on telemetry with ECF management and prolonged TPN. Upgraded to SICU 8/23 for acute delirium and tremors, r/o sepsis vs metabolic encephalopathy. Ammonia levels normal. TPN DC'ed and started on PO diet. Stepped down to 8 Lachman on 8/28.     Course now c/b iATN vs. ELVI, and worsening leakage of enteric content from fistula and increased wound vac output. PO diet stopped and PICC replaced today (9/1) for resuming TPN. Pt seen in room, awake, alert, very pleasant, wife at bedside. TPN ordered to start tonight- starter bag of 140g Dex, 70g AA, 50g SMOF (1256kcal/70g pro). D/w pt and surgery team that this is inadequate and should be advanced to goal macros as quickly as possible if tolerated. Concern for elevated Cr (3.08), but if found to be prerenal etiology should hopefully be less concern in increasing to goal protein. Stat urine lytes sent and nephrology consulted for ELVI. Receiving bolus of D5NS. He denied N/V or pain. Remains afebrile. Continues to receive imodium and cholestyramine. Nutritionally pertinent labs: WBC 14.26 (H), POC , 120mg/dL, Na 122 (L), prealbumin 9 (L), AlkPhos 229 (H). Will continue to follow per RD protocol.       Previous Nutrition Diagnosis:  Increased Nutrient Needs R/T physiological demands for nutrient AEB post-op wound healing    Active [  X ]  Resolved [   ]    Goal:  Pt will meet at least 75% of protein & energy needs via most appropriate route for nutrition     Recommendations:  1. Recommend increasing TPN to goal of 427g Dex, 175g AA, 50g SMOFlipids; provides 2651.8 kcals, 175g protein, GIR 2.94 mg/kg/min, 2g/kg protein  >> Monitor lytes and replete prn. POC BG q6hrs, TG upon start and then weekly  >> Continue supplemental IVF as needed   >> Continue to provide MVI, B1, Vit C, Zinc in TPN bag   2. As medically feasible, please resume Clear Liquids   3. Pain regimen per team   4. Weekly wts   *dw team 1    Education: N/A    Risk Level: High [  X ] Moderate [   ] Low [   ].

## 2023-09-02 NOTE — PROGRESS NOTE ADULT - ASSESSMENT
Continue NPO, TPN  Monitor ostomy and fistula pouch/drain outputs. Currently on max dose imodium, recommend starting lomotil. Replete fluid losses from outputs  Nephrology care appreciated  ICU care appreciated  Continue local wound care to midline  Discussed with chief resident on call

## 2023-09-02 NOTE — PROGRESS NOTE ADULT - ATTENDING COMMENTS
77 yo M w/ extensive and prolonged hospital course as above related to complications 2/2 Crohn's now transferred back to SICU for acute symptomatic hyponatremia and nonoliguric renal failure, likely multifactorial 2/2 excessive volume loss from high ostomy output, hypovolemia, TPN fluids. W/ fluid resuscitation and hypertonic saline sodium has corrected nicely, now 128. UOP remains 160-200/hr. He remains on TPN 1/2 NS, solution to be changed but will continue to receive 1/2 NS until 5PM. Plan to continue NS until new TPN fluids can be started to avoid drop in Na again. Prefer not to hold TPN given risk for hypoglycemia. Follow UOP, Lauren, Uosm closely. Trend Cr, mcaculey placed, renal US pending. Discussed w/ renal. Rest of plan as above    Direct personal management at bed side and extensive interpretation of the data.  Plan was outlined and discussed in details with the housestaff.  Decision making of highest complexity  Action taken for acute disease activity to reflect the level of care provided:  - medication reconciliation  - review laboratory data

## 2023-09-02 NOTE — PROGRESS NOTE ADULT - SUBJECTIVE AND OBJECTIVE BOX
Patient is a 78y Male seen and evaluated at bedside.       Meds:    benzocaine/menthol Lozenge 2 every 4 hours PRN  chlorhexidine 2% Cloths 1 <User Schedule>  cholestyramine Powder (Sugar-Free) 4 two times a day  dextrose 5%. 1000 <Continuous>  dextrose 50% Injectable 25 once  dextrose 50% Injectable 25 once  dextrose Oral Gel 15 once PRN  glucagon  Injectable 1 once  glucagon  Injectable 1 once  heparin   Injectable 5000 every 8 hours  insulin lispro (ADMELOG) corrective regimen sliding scale  every 6 hours  levothyroxine Injectable 40 <User Schedule>  lipid, fat emulsion (Fish Oil and Plant Based) 20% Infusion 0.4999 <Continuous>  loperamide 4 every 6 hours  melatonin 5 at bedtime PRN  metoprolol tartrate 25 every 12 hours  nystatin Powder 1 three times a day  pantoprazole  Injectable 40 daily  Parenteral Nutrition - Adult 1 <Continuous>  sodium chloride 2% . 1000 <Continuous>  venlafaxine XR. 300 daily      T(C): , Max: 36.6 (09-01-23 @ 21:53)  T(F): , Max: 97.8 (09-01-23 @ 21:53)  HR: 87 (09-02-23 @ 08:00)  BP: 107/52 (09-02-23 @ 08:00)  BP(mean): 75 (09-02-23 @ 08:00)  RR: 24 (09-02-23 @ 08:00)  SpO2: 100% (09-02-23 @ 08:00)  Wt(kg): --    09-01 @ 07:01  -  09-02 @ 07:00  --------------------------------------------------------  IN: 7198.8 mL / OUT: 4455 mL / NET: 2743.8 mL    09-02 @ 07:01  -  09-02 @ 08:54  --------------------------------------------------------  IN: 70.8 mL / OUT: 360 mL / NET: -289.2 mL          Review of Systems:  ROS negative except as per HPI      PHYSICAL EXAM:  GENERAL: NAD  NECK: supple, No JVD  CHEST/LUNG: Clear to auscultation bilaterally  HEART: normal S1S2, RRR  ABDOMEN: Soft, Nontender, +BS, No flank tenderness bilateral  EXTREMITIES: No clubbing, cyanosis, or edema   NEUROLOGY: AAO x3, no focal neurological deficit  ACCESS: good thrill and bruit appreciated      LABS:                        9.0    13.34 )-----------( 258      ( 02 Sep 2023 05:31 )             29.2     09-02    128<L>  |  101  |  28<H>  ----------------------------<  88  4.5   |  17<L>  |  3.55<H>    Ca    8.0<L>      02 Sep 2023 07:40  Phos  4.0     09-02  Mg     1.9     09-02    TPro  7.4  /  Alb  2.0<L>  /  TBili  3.6<H>  /  DBili  x   /  AST  38  /  ALT  36  /  AlkPhos  173<H>  09-02    Osmolality, Serum: 279 mosm/kg *L* [280 - 301] (09-02 @ 05:31)  Osmolality, Serum: 276 mosm/kg *L* [280 - 301] (09-01 @ 21:13)    PT/INR - ( 01 Sep 2023 07:26 )   PT: 15.9 sec;   INR: 1.41            Urinalysis Basic - ( 02 Sep 2023 07:40 )    Color: x / Appearance: x / SG: x / pH: x  Gluc: 88 mg/dL / Ketone: x  / Bili: x / Urobili: x   Blood: x / Protein: x / Nitrite: x   Leuk Esterase: x / RBC: x / WBC x   Sq Epi: x / Non Sq Epi: x / Bacteria: x      Sodium, Random Urine: 81 mmol/L (09-02 @ 07:02)  Creatinine, Random Urine: 19 mg/dL (09-02 @ 07:02)  Protein/Creatinine Ratio Calculation: 2.0 Ratio (09-02 @ 07:02)  Osmolality, Random Urine: 193 mosm/kg (09-02 @ 07:02)  Potassium, Random Urine: 3 mmol/L (09-02 @ 07:02)  Sodium, Random Urine: 58 mmol/L (09-01 @ 20:03)  Creatinine, Random Urine: 40 mg/dL (09-01 @ 20:03)  Protein/Creatinine Ratio Calculation: 1.4 Ratio (09-01 @ 20:03)  Osmolality, Random Urine: 178 mosm/kg (09-01 @ 20:03)  Potassium, Random Urine: 9 mmol/L (09-01 @ 20:03)  Sodium, Random Urine: 36 mmol/L (09-01 @ 18:23)  Creatinine, Random Urine: 96 mg/dL (09-01 @ 18:23)  Osmolality, Random Urine: 193 mosm/kg (09-01 @ 18:23)        RADIOLOGY & ADDITIONAL STUDIES:           Patient is a 78y Male seen and evaluated at bedside. No acute distress, sCr plateaued. pos 2.8L. UO 2.2L        Meds:    benzocaine/menthol Lozenge 2 every 4 hours PRN  chlorhexidine 2% Cloths 1 <User Schedule>  cholestyramine Powder (Sugar-Free) 4 two times a day  dextrose 5%. 1000 <Continuous>  dextrose 50% Injectable 25 once  dextrose 50% Injectable 25 once  dextrose Oral Gel 15 once PRN  glucagon  Injectable 1 once  glucagon  Injectable 1 once  heparin   Injectable 5000 every 8 hours  insulin lispro (ADMELOG) corrective regimen sliding scale  every 6 hours  levothyroxine Injectable 40 <User Schedule>  lipid, fat emulsion (Fish Oil and Plant Based) 20% Infusion 0.4999 <Continuous>  loperamide 4 every 6 hours  melatonin 5 at bedtime PRN  metoprolol tartrate 25 every 12 hours  nystatin Powder 1 three times a day  pantoprazole  Injectable 40 daily  Parenteral Nutrition - Adult 1 <Continuous>  sodium chloride 2% . 1000 <Continuous>  venlafaxine XR. 300 daily      T(C): , Max: 36.6 (09-01-23 @ 21:53)  T(F): , Max: 97.8 (09-01-23 @ 21:53)  HR: 87 (09-02-23 @ 08:00)  BP: 107/52 (09-02-23 @ 08:00)  BP(mean): 75 (09-02-23 @ 08:00)  RR: 24 (09-02-23 @ 08:00)  SpO2: 100% (09-02-23 @ 08:00)  Wt(kg): --    09-01 @ 07:01  -  09-02 @ 07:00  --------------------------------------------------------  IN: 7198.8 mL / OUT: 4455 mL / NET: 2743.8 mL    09-02 @ 07:01  -  09-02 @ 08:54  --------------------------------------------------------  IN: 70.8 mL / OUT: 360 mL / NET: -289.2 mL          Review of Systems:  ROS negative except as per HPI      PHYSICAL EXAM:  GENERAL: Alert, awake, NAD   CHEST/LUNG: Bilateral clear breath sounds  HEART: S1, S2, RRR  ABDOMEN: Soft, ileostomy bags  : No mccauley  EXTREMITIES: no edema  Neurology: AAOx3, no focal neurological deficits noted      LABS:                        9.0    13.34 )-----------( 258      ( 02 Sep 2023 05:31 )             29.2     09-02    128<L>  |  101  |  28<H>  ----------------------------<  88  4.5   |  17<L>  |  3.55<H>    Ca    8.0<L>      02 Sep 2023 07:40  Phos  4.0     09-02  Mg     1.9     09-02    TPro  7.4  /  Alb  2.0<L>  /  TBili  3.6<H>  /  DBili  x   /  AST  38  /  ALT  36  /  AlkPhos  173<H>  09-02    Osmolality, Serum: 279 mosm/kg *L* [280 - 301] (09-02 @ 05:31)  Osmolality, Serum: 276 mosm/kg *L* [280 - 301] (09-01 @ 21:13)    PT/INR - ( 01 Sep 2023 07:26 )   PT: 15.9 sec;   INR: 1.41            Urinalysis Basic - ( 02 Sep 2023 07:40 )    Color: x / Appearance: x / SG: x / pH: x  Gluc: 88 mg/dL / Ketone: x  / Bili: x / Urobili: x   Blood: x / Protein: x / Nitrite: x   Leuk Esterase: x / RBC: x / WBC x   Sq Epi: x / Non Sq Epi: x / Bacteria: x      Sodium, Random Urine: 81 mmol/L (09-02 @ 07:02)  Creatinine, Random Urine: 19 mg/dL (09-02 @ 07:02)  Protein/Creatinine Ratio Calculation: 2.0 Ratio (09-02 @ 07:02)  Osmolality, Random Urine: 193 mosm/kg (09-02 @ 07:02)  Potassium, Random Urine: 3 mmol/L (09-02 @ 07:02)  Sodium, Random Urine: 58 mmol/L (09-01 @ 20:03)  Creatinine, Random Urine: 40 mg/dL (09-01 @ 20:03)  Protein/Creatinine Ratio Calculation: 1.4 Ratio (09-01 @ 20:03)  Osmolality, Random Urine: 178 mosm/kg (09-01 @ 20:03)  Potassium, Random Urine: 9 mmol/L (09-01 @ 20:03)  Sodium, Random Urine: 36 mmol/L (09-01 @ 18:23)  Creatinine, Random Urine: 96 mg/dL (09-01 @ 18:23)  Osmolality, Random Urine: 193 mosm/kg (09-01 @ 18:23)        RADIOLOGY & ADDITIONAL STUDIES:

## 2023-09-02 NOTE — PROGRESS NOTE ADULT - ASSESSMENT
78 M w/ Crohn's, AFib/Flutter s/p DCCVs on amiodarone, remote ileocectomy and open appendectomy. Admitted (6/23) for SBO vs Crohns flare, s/p NGT decompression and s/p lap TRE converted to open TRE, SBR x 3, left in discontinuity with abthera vac on (6/27), RTOR for ileocolic resection, small bowel anastomosis, and abdominal wall closure on (6/28), c/b fluid collection s/p IR aspiration of perihepatic fluid on (7/3), c/b wound dehiscence s/p RTOR exlap, washout, ileocolic resection with end ileostomy, blow hole colostomy, red rubber from ileostomy to small bowel anastomosis; vicryl bridging mesh on (7/5). Went to SICU but extubated 7/6 and SDU 8/2. Has been recovering on telemetry with ECF management and prolonged TPN. Returned to SICU 8/23 with AMS and concern for sepsis now back SDU. Patient was trialed on oral diet this week 8/28 but has since had increasing output and leakage from vac. Decreased urine output, lead to increased creatinine, patient transferred to SICU for closer output monitoring.      Plan:  Continue NPO, TPN  Monitor ostomy and fistula pouch/drain outputs. Currently on max dose imodium, recommend starting lomotil. Replete fluid losses from outputs  Nephrology care appreciated  rest per ICU care   Continue local wound care to midline

## 2023-09-02 NOTE — PROGRESS NOTE ADULT - ASSESSMENT
78 M w/ Crohn's, AFib/Flutter s/p DCCVs on amiodarone, remote ileocectomy and open appendectomy. Admitted (6/23) for SBO vs Crohns flare, s/p NGT decompression and s/p lap TRE converted to open TRE, SBR x 3, left in discontinuity with abthera vac on (6/27), RTOR for ileocolic resection, small bowel anastomosis, and abdominal wall closure on (6/28), c/b fluid collection s/p IR aspiration of perihepatic fluid on (7/3), c/b wound dehiscence s/p RTOR exlap, washout, ileocolic resection with end ileostomy, blow hole colostomy, red rubber from ileostomy to small bowel anastomosis; vicryl bridging mesh on (7/5). Went to SICU but extubated 7/6 and SDU 8/2. Has been recovering on telemetry with ECF management and prolonged TPN. Returns to SICU 8/23 with AMS and concern for sepsis now back SDU. Stepped back to SICU 9/1 for oliguria, severe ELVI and hyponatremia (9/1).     NEURO: A&Ox3. Previous concerns with AMS 2/2 likely anticholingeric effects from scopolamine resolved. Hx of depression - Continue outpatient effexor. Pain control with Dilaudid for vac changes.   CV: Clinically appears hypovolemic with dry mucus membranes. Hx Afib/flutter: s/p DCCV, Off amiodarone given transaminitis and elevated T bili, Hx of AFib - Continue Metoprolol 25 BID. Hx HTN - Holding Norvasc, Losartan. Hx HLD: hold Lipitor given LFTs. TTE  (7/18) - PASP 64mmHg, EF 65-70%,   PULM: No concerns currently. Saturating well on RA. Monitor for fluid overload after resuscitation.  GI/FEN: Previously on CLD with high fistula/wound vac output - started on Immodium 4q6 yesterday, start lomotil. Now NPO with TPN/PICC. Transaminitis of unclear etiology: distended gallbladder on CT 8/25, RUQ US negative. Monitor output from EC fistula and Wound vac daily.  : Severe ELVI (bl Cr 0.95, now >3). Ulytes and UA pending. Poole placed for strict Is and Os. RP U/S pending. Nephrology recommendations appreciated. Hyponatremia: last Na 122 - resuscitated today with NS. F/U STAT BMP  ENDO:  Hx hypothyroid: IV Synthroid, TSH 9 (8/23), recheck in 2-3 weeks. Recurrent Hypoglycemia: on D5LR currently.   ID: Numerous SICU admissions from sepsis, most recently with concern for line sepsis. Now WBC dowtrended. Currently off abx. ////   CTAP (8/25) without fluid collections, distended GB, atelectasis. Restart Imipenem (8/26--).  Previously had C. tertium, Lactobacillis from his IR cx 7/3, and candida albicans, lactobacillus, vanc sensitive E faecium, vanc resistant E gallinarum, vanc resistant E casseliflavus, lactobacillus from his OR cx 7/5. Completed course of abx with imipenem (6/30-7/12, 7/23-7/30), Dapto (6/30-7/5 and 7/23-7/24). been off all abx since 7/30. empiric dapto (8/23-24) and cefepime (8/23-24).   PPX: SCDs, SQH  LINES: PIVs, LUE PICC (9/1 - )  WOUNDS/DRAINS: ECF abdominal wound with vac change Mon/Fri--next on 9/5. PT: Home health PT  DISPO: SICU

## 2023-09-02 NOTE — PROGRESS NOTE ADULT - SUBJECTIVE AND OBJECTIVE BOX
INCOMPLETE Overnight events: moved to SICU      PO  : Laparoscopic lysis of adhesions, converted to open lysis of adhesions, SBR x 3, temporary abdominal closure, 5L cryst, 1L 5% alb, 3 pRBC, 1 FFP, 1 plts  : ex lap, removal of abthera, ileocolic resection, small bowel anastamosis, , 1.6 crystalloid, 250 5%, 175 uop   7/3: IR Gendel drained perihepatic aspiration of serous fluid.   : RTOR exlap, washout, ileocolic resection with end ileostomy, blow hole colostomy, fistula, red rubber from ileostomy to small bowel anastomosis; vicryl bridging mesh; R JOYCE below ileostomy, L JOYCE at small bowel enterotomy repair; 500     SUBJECTIVE: Patient seen at bedside with chief resident. Patient denies any current pain or nausea. He notes he is feeling okay.       MEDICATIONS  (STANDING):  chlorhexidine 2% Cloths 1 Application(s) Topical <User Schedule>  cholestyramine Powder (Sugar-Free) 4 Gram(s) Oral two times a day  dextrose 5%. 1000 milliLiter(s) (50 mL/Hr) IV Continuous <Continuous>  dextrose 50% Injectable 25 Gram(s) IV Push once  dextrose 50% Injectable 25 Gram(s) IV Push once  diphenoxylate/atropine 2 Tablet(s) Oral every 6 hours  glucagon  Injectable 1 milliGRAM(s) IntraMuscular once  glucagon  Injectable 1 milliGRAM(s) IntraMuscular once  heparin   Injectable 5000 Unit(s) SubCutaneous every 8 hours  insulin lispro (ADMELOG) corrective regimen sliding scale   SubCutaneous every 6 hours  levothyroxine Injectable 40 MICROGram(s) IV Push <User Schedule>  lipid, fat emulsion (Fish Oil and Plant Based) 20% Infusion 0.4999 Gm/kG/Day (20.83 mL/Hr) IV Continuous <Continuous>  loperamide 4 milliGRAM(s) Oral every 6 hours  metoprolol tartrate 25 milliGRAM(s) Oral every 12 hours  nystatin Powder 1 Application(s) Topical three times a day  pantoprazole  Injectable 40 milliGRAM(s) IV Push daily  Parenteral Nutrition - Adult 1 Each (67 mL/Hr) TPN Continuous <Continuous>  Parenteral Nutrition - Adult 1 Each (50 mL/Hr) TPN Continuous <Continuous>  venlafaxine XR. 300 milliGRAM(s) Oral daily    MEDICATIONS  (PRN):  benzocaine/menthol Lozenge 2 Lozenge Oral every 4 hours PRN Sore Throat  dextrose Oral Gel 15 Gram(s) Oral once PRN Blood Glucose LESS THAN 70 milliGRAM(s)/deciliter  melatonin 5 milliGRAM(s) Oral at bedtime PRN Sleep      Vital Signs Last 24 Hrs  T(C): 37.2 (02 Sep 2023 09:00), Max: 37.2 (02 Sep 2023 09:00)  T(F): 98.9 (02 Sep 2023 09:00), Max: 98.9 (02 Sep 2023 09:00)  HR: 88 (02 Sep 2023 11:00) (80 - 88)  BP: 107/51 (02 Sep 2023 11:) (103/57 - 168/74)  BP(mean): 73 (02 Sep 2023 11:) (73 - 106)  RR: 24 (02 Sep 2023 11:) (16 - 27)  SpO2: 95% (02 Sep 2023 11:) (95% - 100%)    Parameters below as of 02 Sep 2023 11:00  Patient On (Oxygen Delivery Method): room air        Physical Exam:  General: NAD, resting comfortably in bed  Pulmonary: Nonlabored breathing, no respiratory distress  Cardiovascular: NSR  Abdominal: soft, vac in place with active suction,   Extremities: WWP, normal strength  Neuro: A/O x 3, CNs II-XII grossly intact, no focal deficits, normal motor/sensation  Pulses: palpable distal pulses    I&O's Summary    01 Sep 2023 07:01  -  02 Sep 2023 07:00  --------------------------------------------------------  IN: 7198.8 mL / OUT: 4455 mL / NET: 2743.8 mL    02 Sep 2023 07:01  -  02 Sep 2023 12:23  --------------------------------------------------------  IN: 191.6 mL / OUT: 470 mL / NET: -278.4 mL        LABS:                        9.0    13.34 )-----------( 258      ( 02 Sep 2023 05:31 )             29.2     09-02    128<L>  |  101  |  28<H>  ----------------------------<  88  4.5   |  17<L>  |  3.55<H>    Ca    8.0<L>      02 Sep 2023 07:40  Phos  4.0     09-  Mg     1.9         TPro  7.4  /  Alb  2.0<L>  /  TBili  3.6<H>  /  DBili  x   /  AST  38  /  ALT  36  /  AlkPhos  173<H>      PT/INR - ( 01 Sep 2023 07:26 )   PT: 15.9 sec;   INR: 1.41            Urinalysis Basic - ( 02 Sep 2023 08:46 )    Color: Yellow / Appearance: Clear / S.010 / pH: x  Gluc: x / Ketone: NEGATIVE  / Bili: Negative / Urobili: 0.2 E.U./dL   Blood: x / Protein: 30 mg/dL / Nitrite: NEGATIVE   Leuk Esterase: Trace / RBC: x / WBC x   Sq Epi: x / Non Sq Epi: x / Bacteria: x      CAPILLARY BLOOD GLUCOSE      POCT Blood Glucose.: 91 mg/dL (02 Sep 2023 11:48)  POCT Blood Glucose.: 88 mg/dL (02 Sep 2023 06:43)  POCT Blood Glucose.: 92 mg/dL (02 Sep 2023 02:17)  POCT Blood Glucose.: 143 mg/dL (01 Sep 2023 21:59)  POCT Blood Glucose.: 65 mg/dL (01 Sep 2023 20:53)  POCT Blood Glucose.: 74 mg/dL (01 Sep 2023 17:16)    LIVER FUNCTIONS - ( 02 Sep 2023 05:31 )  Alb: 2.0 g/dL / Pro: 7.4 g/dL / ALK PHOS: 173 U/L / ALT: 36 U/L / AST: 38 U/L / GGT: x             RADIOLOGY & ADDITIONAL STUDIES:

## 2023-09-02 NOTE — PROGRESS NOTE ADULT - SUBJECTIVE AND OBJECTIVE BOX
[employer] Attending Coverage for Dr Peterson    Discussed yesterday evening with Nephrolgy Dr Pacheco  Fluid resuscitation - 5.5L total overnight  Moved to ICU for closer monitoring  Improved urine output - mccauley in place 2.2L/24hrs  Fistula pouch/drain with high output, currently on imodium. NPO.  Got PICC yesterday, started on TPN    ICU Vital Signs Last 24 Hrs  T(C): 36.5 (02 Sep 2023 05:00), Max: 36.6 (01 Sep 2023 21:53)  T(F): 97.7 (02 Sep 2023 05:00), Max: 97.8 (01 Sep 2023 21:53)  HR: 88 (02 Sep 2023 09:00) (80 - 88)  BP: 115/56 (02 Sep 2023 09:00) (103/57 - 168/74)  BP(mean): 79 (02 Sep 2023 09:00) (73 - 106)  ABP: --  ABP(mean): --  RR: 24 (02 Sep 2023 09:00) (16 - 27)  SpO2: 99% (02 Sep 2023 09:00) (96% - 100%)    O2 Parameters below as of 02 Sep 2023 09:00  Patient On (Oxygen Delivery Method): room air        I&O's Detail    01 Sep 2023 07:01  -  02 Sep 2023 07:00  --------------------------------------------------------  IN:    dextrose 5% + sodium chloride 0.45%: 360 mL    dextrose 5% + sodium chloride 0.9%: 1080 mL    Fat Emulsion (Fish Oil &amp; Plant Based) 20% Infusion: 228.8 mL    sodium chloride 0.9%: 240 mL    Sodium Chloride 0.9% Bolus: 4500 mL    sodium chloride 2%: 240 mL    TPN (Total Parenteral Nutrition): 550 mL  Total IN: 7198.8 mL    OUT:    Bulb (mL): 70 mL    Drain (mL): 550 mL    Drain (mL): 800 mL    Ileostomy (mL): 25 mL    Indwelling Catheter - Urethral (mL): 2235 mL    VAC (Vacuum Assisted Closure) System (mL): 625 mL    Voided (mL): 150 mL  Total OUT: 4455 mL    Total NET: 2743.8 mL      02 Sep 2023 07:01  -  02 Sep 2023 09:47  --------------------------------------------------------  IN:    Fat Emulsion (Fish Oil &amp; Plant Based) 20% Infusion: 41.6 mL    TPN (Total Parenteral Nutrition): 100 mL  Total IN: 141.6 mL    OUT:    Drain (mL): 175 mL    Indwelling Catheter - Urethral (mL): 210 mL    VAC (Vacuum Assisted Closure) System (mL): 25 mL  Total OUT: 410 mL    Total NET: -268.4 mL      Gen NAD  Abdomen soft, midline wound manager and VAC in place without leak, (+) ostomy, nontender                          9.0    13.34 )-----------( 258      ( 02 Sep 2023 05:31 )             29.2   09-02    128<L>  |  101  |  28<H>  ----------------------------<  88  4.5   |  17<L>  |  3.55<H>    Ca    8.0<L>      02 Sep 2023 07:40  Phos  4.0     09-02  Mg     1.9     09-02    TPro  7.4  /  Alb  2.0<L>  /  TBili  3.6<H>  /  DBili  x   /  AST  38  /  ALT  36  /  AlkPhos  173<H>  09-02

## 2023-09-02 NOTE — PROGRESS NOTE ADULT - SUBJECTIVE AND OBJECTIVE BOX
Feels better this am No nausea VS stable Afeb Exam stable  Creatinine starting to decrease Na and lytes better Urine is grossly clear!

## 2023-09-02 NOTE — PROGRESS NOTE ADULT - SUBJECTIVE AND OBJECTIVE BOX
SUBJECTIVE:      MEDICATIONS  (STANDING):  chlorhexidine 2% Cloths 1 Application(s) Topical <User Schedule>  cholestyramine Powder (Sugar-Free) 4 Gram(s) Oral two times a day  dextrose 5%. 1000 milliLiter(s) (50 mL/Hr) IV Continuous <Continuous>  dextrose 50% Injectable 25 Gram(s) IV Push once  dextrose 50% Injectable 25 Gram(s) IV Push once  diphenoxylate/atropine 2 Tablet(s) Oral every 6 hours  glucagon  Injectable 1 milliGRAM(s) IntraMuscular once  glucagon  Injectable 1 milliGRAM(s) IntraMuscular once  heparin   Injectable 5000 Unit(s) SubCutaneous every 8 hours  insulin lispro (ADMELOG) corrective regimen sliding scale   SubCutaneous every 6 hours  levothyroxine Injectable 40 MICROGram(s) IV Push <User Schedule>  lipid, fat emulsion (Fish Oil and Plant Based) 20% Infusion 0.4999 Gm/kG/Day (20.83 mL/Hr) IV Continuous <Continuous>  lipid, fat emulsion (Fish Oil and Plant Based) 20% Infusion 0.4999 Gm/kG/Day (20.83 mL/Hr) IV Continuous <Continuous>  loperamide 4 milliGRAM(s) Oral every 6 hours  metoprolol tartrate 25 milliGRAM(s) Oral every 12 hours  nystatin Powder 1 Application(s) Topical three times a day  pantoprazole  Injectable 40 milliGRAM(s) IV Push daily  Parenteral Nutrition - Adult 1 Each (50 mL/Hr) TPN Continuous <Continuous>  Parenteral Nutrition - Adult 1 Each (67 mL/Hr) TPN Continuous <Continuous>  venlafaxine XR. 300 milliGRAM(s) Oral daily    MEDICATIONS  (PRN):  benzocaine/menthol Lozenge 2 Lozenge Oral every 4 hours PRN Sore Throat  dextrose Oral Gel 15 Gram(s) Oral once PRN Blood Glucose LESS THAN 70 milliGRAM(s)/deciliter  melatonin 5 milliGRAM(s) Oral at bedtime PRN Sleep      Vital Signs Last 24 Hrs  T(C): 37.2 (02 Sep 2023 09:00), Max: 37.2 (02 Sep 2023 09:00)  T(F): 98.9 (02 Sep 2023 09:00), Max: 98.9 (02 Sep 2023 09:00)  HR: 88 (02 Sep 2023 10:00) (80 - 88)  BP: 126/60 (02 Sep 2023 10:00) (103/57 - 168/74)  BP(mean): 87 (02 Sep 2023 10:00) (73 - 106)  RR: 24 (02 Sep 2023 10:00) (16 - 27)  SpO2: 100% (02 Sep 2023 10:00) (96% - 100%)    Parameters below as of 02 Sep 2023 10:00  Patient On (Oxygen Delivery Method): room air        Physical Exam:  GENERAL: NAD, resting comfortably in bed, AxOx3, follows commands.   HEENT: NCAT, MMM. Scleral icterus noted  C/V: Normal rate, normal peripheral perfusion, no murmurs  PULM: Nonlabored breathing, no respiratory distress, CTA b/l  ABD: Soft, ND, NT, no rebound tenderness, no guarding, vac in place holding suction with well-sealed pouching and drains putting out serobilious output.   EXTREM: WWP, no edema, SCDs in place  NEURO: No focal deficits    I&O's Summary    01 Sep 2023 07:01  -  02 Sep 2023 07:00  --------------------------------------------------------  IN: 7198.8 mL / OUT: 4455 mL / NET: 2743.8 mL    02 Sep 2023 07:01  -  02 Sep 2023 10:21  --------------------------------------------------------  IN: 191.6 mL / OUT: 470 mL / NET: -278.4 mL        LABS:                        9.0    13.34 )-----------( 258      ( 02 Sep 2023 05:31 )             29.2     09-02    128<L>  |  101  |  28<H>  ----------------------------<  88  4.5   |  17<L>  |  3.55<H>    Ca    8.0<L>      02 Sep 2023 07:40  Phos  4.0     09-02  Mg     1.9     09-02    TPro  7.4  /  Alb  2.0<L>  /  TBili  3.6<H>  /  DBili  x   /  AST  38  /  ALT  36  /  AlkPhos  173<H>  09-02    PT/INR - ( 01 Sep 2023 07:26 )   PT: 15.9 sec;   INR: 1.41            Urinalysis Basic - ( 02 Sep 2023 07:40 )    Color: x / Appearance: x / SG: x / pH: x  Gluc: 88 mg/dL / Ketone: x  / Bili: x / Urobili: x   Blood: x / Protein: x / Nitrite: x   Leuk Esterase: x / RBC: x / WBC x   Sq Epi: x / Non Sq Epi: x / Bacteria: x      CAPILLARY BLOOD GLUCOSE      POCT Blood Glucose.: 88 mg/dL (02 Sep 2023 06:43)  POCT Blood Glucose.: 92 mg/dL (02 Sep 2023 02:17)  POCT Blood Glucose.: 143 mg/dL (01 Sep 2023 21:59)  POCT Blood Glucose.: 65 mg/dL (01 Sep 2023 20:53)  POCT Blood Glucose.: 74 mg/dL (01 Sep 2023 17:16)  POCT Blood Glucose.: 117 mg/dL (01 Sep 2023 11:15)    LIVER FUNCTIONS - ( 02 Sep 2023 05:31 )  Alb: 2.0 g/dL / Pro: 7.4 g/dL / ALK PHOS: 173 U/L / ALT: 36 U/L / AST: 38 U/L / GGT: x             RADIOLOGY & ADDITIONAL STUDIES:       SUBJECTIVE: Patient seen and evaluated.        MEDICATIONS  (STANDING):  chlorhexidine 2% Cloths 1 Application(s) Topical <User Schedule>  cholestyramine Powder (Sugar-Free) 4 Gram(s) Oral two times a day  dextrose 5%. 1000 milliLiter(s) (50 mL/Hr) IV Continuous <Continuous>  dextrose 50% Injectable 25 Gram(s) IV Push once  dextrose 50% Injectable 25 Gram(s) IV Push once  diphenoxylate/atropine 2 Tablet(s) Oral every 6 hours  glucagon  Injectable 1 milliGRAM(s) IntraMuscular once  glucagon  Injectable 1 milliGRAM(s) IntraMuscular once  heparin   Injectable 5000 Unit(s) SubCutaneous every 8 hours  insulin lispro (ADMELOG) corrective regimen sliding scale   SubCutaneous every 6 hours  levothyroxine Injectable 40 MICROGram(s) IV Push <User Schedule>  lipid, fat emulsion (Fish Oil and Plant Based) 20% Infusion 0.4999 Gm/kG/Day (20.83 mL/Hr) IV Continuous <Continuous>  lipid, fat emulsion (Fish Oil and Plant Based) 20% Infusion 0.4999 Gm/kG/Day (20.83 mL/Hr) IV Continuous <Continuous>  loperamide 4 milliGRAM(s) Oral every 6 hours  metoprolol tartrate 25 milliGRAM(s) Oral every 12 hours  nystatin Powder 1 Application(s) Topical three times a day  pantoprazole  Injectable 40 milliGRAM(s) IV Push daily  Parenteral Nutrition - Adult 1 Each (50 mL/Hr) TPN Continuous <Continuous>  Parenteral Nutrition - Adult 1 Each (67 mL/Hr) TPN Continuous <Continuous>  venlafaxine XR. 300 milliGRAM(s) Oral daily    MEDICATIONS  (PRN):  benzocaine/menthol Lozenge 2 Lozenge Oral every 4 hours PRN Sore Throat  dextrose Oral Gel 15 Gram(s) Oral once PRN Blood Glucose LESS THAN 70 milliGRAM(s)/deciliter  melatonin 5 milliGRAM(s) Oral at bedtime PRN Sleep      Vital Signs Last 24 Hrs  T(C): 37.2 (02 Sep 2023 09:00), Max: 37.2 (02 Sep 2023 09:00)  T(F): 98.9 (02 Sep 2023 09:00), Max: 98.9 (02 Sep 2023 09:00)  HR: 88 (02 Sep 2023 10:00) (80 - 88)  BP: 126/60 (02 Sep 2023 10:00) (103/57 - 168/74)  BP(mean): 87 (02 Sep 2023 10:00) (73 - 106)  RR: 24 (02 Sep 2023 10:00) (16 - 27)  SpO2: 100% (02 Sep 2023 10:00) (96% - 100%)    Parameters below as of 02 Sep 2023 10:00  Patient On (Oxygen Delivery Method): room air        Physical Exam:  GENERAL: NAD, resting comfortably in bed, AxOx3, follows commands.   HEENT: NCAT, MMM. Scleral icterus noted  C/V: Normal rate, normal peripheral perfusion, no murmurs  PULM: Nonlabored breathing, no respiratory distress, CTA b/l  ABD: Soft, ND, NT, no rebound tenderness, no guarding, vac in place holding suction with well-sealed pouching and drains putting out serobilious output.   EXTREM: WWP, no edema, SCDs in place  NEURO: No focal deficits    I&O's Summary    01 Sep 2023 07:01  -  02 Sep 2023 07:00  --------------------------------------------------------  IN: 7198.8 mL / OUT: 4455 mL / NET: 2743.8 mL    02 Sep 2023 07:01  -  02 Sep 2023 10:21  --------------------------------------------------------  IN: 191.6 mL / OUT: 470 mL / NET: -278.4 mL        LABS:                        9.0    13.34 )-----------( 258      ( 02 Sep 2023 05:31 )             29.2     09-02    128<L>  |  101  |  28<H>  ----------------------------<  88  4.5   |  17<L>  |  3.55<H>    Ca    8.0<L>      02 Sep 2023 07:40  Phos  4.0     09-02  Mg     1.9     09-02    TPro  7.4  /  Alb  2.0<L>  /  TBili  3.6<H>  /  DBili  x   /  AST  38  /  ALT  36  /  AlkPhos  173<H>  09-02    PT/INR - ( 01 Sep 2023 07:26 )   PT: 15.9 sec;   INR: 1.41            Urinalysis Basic - ( 02 Sep 2023 07:40 )    Color: x / Appearance: x / SG: x / pH: x  Gluc: 88 mg/dL / Ketone: x  / Bili: x / Urobili: x   Blood: x / Protein: x / Nitrite: x   Leuk Esterase: x / RBC: x / WBC x   Sq Epi: x / Non Sq Epi: x / Bacteria: x      CAPILLARY BLOOD GLUCOSE      POCT Blood Glucose.: 88 mg/dL (02 Sep 2023 06:43)  POCT Blood Glucose.: 92 mg/dL (02 Sep 2023 02:17)  POCT Blood Glucose.: 143 mg/dL (01 Sep 2023 21:59)  POCT Blood Glucose.: 65 mg/dL (01 Sep 2023 20:53)  POCT Blood Glucose.: 74 mg/dL (01 Sep 2023 17:16)  POCT Blood Glucose.: 117 mg/dL (01 Sep 2023 11:15)    LIVER FUNCTIONS - ( 02 Sep 2023 05:31 )  Alb: 2.0 g/dL / Pro: 7.4 g/dL / ALK PHOS: 173 U/L / ALT: 36 U/L / AST: 38 U/L / GGT: x             RADIOLOGY & ADDITIONAL STUDIES:

## 2023-09-02 NOTE — PROGRESS NOTE ADULT - ASSESSMENT
77 yo M w/ prolonged hospital course, originally admitted for SBO and CD flare, hospital course c/b multiple GI surgeries, wound dehiscences, ileostomy EC fistula. Nephrology consulted 9/1 for oliguric ATN     #Non-oliguric iATN   Cr had been in 0.9-1.0 range, now rise 1.12-->3.55 plateau   Relative hypotension, BP dropping to 90s, 100s systolic compared to 130s-140s before  GI output also increased since 8/30  UA consistent with ATN, hematuria likely due to traumatic mccauley placement   Still appears hypovolemic on exam, electrolytes improving       Recommend:  Change composition of TPN to isotonic fluid   sNa improving with  fluids and appropriately rising,   Maintain pos FB, can bolus 500cc as needed, if UP drops will continue with frequent boluses   Strict ins and outs  Ensure BP >120 for adequate renal perfusion  Management of high GI output per primary team

## 2023-09-03 NOTE — PROGRESS NOTE ADULT - SUBJECTIVE AND OBJECTIVE BOX
Patient is a 78y Male seen and evaluated at bedside. No acute distress, out of bed to chair, stable HDS, net pos 200cc, sCr improving.       Meds:    benzocaine/menthol Lozenge 2 every 4 hours PRN  chlorhexidine 2% Cloths 1 <User Schedule>  cholestyramine Powder (Sugar-Free) 4 two times a day  dextrose 5%. 1000 <Continuous>  dextrose 50% Injectable 25 once  dextrose 50% Injectable 25 once  dextrose Oral Gel 15 once PRN  diphenoxylate/atropine 2 every 6 hours  glucagon  Injectable 1 once  glucagon  Injectable 1 once  heparin   Injectable 5000 every 8 hours  insulin lispro (ADMELOG) corrective regimen sliding scale  every 6 hours  levothyroxine Injectable 40 <User Schedule>  lipid, fat emulsion (Fish Oil and Plant Based) 20% Infusion 0.4999 <Continuous>  loperamide 4 every 6 hours  melatonin 5 at bedtime PRN  metoprolol tartrate 25 every 12 hours  nystatin Powder 1 three times a day  octreotide  Injectable 100 every 8 hours  pantoprazole  Injectable 40 daily  Parenteral Nutrition - Adult 1 <Continuous>  sodium chloride 0.9%. 1000 <Continuous>  venlafaxine XR. 300 daily      T(C): , Max: 37.2 (09-03-23 @ 06:00)  T(F): , Max: 99 (09-03-23 @ 06:00)  HR: 84 (09-03-23 @ 08:00)  BP: 110/55 (09-03-23 @ 08:00)  BP(mean): 76 (09-03-23 @ 08:00)  RR: 17 (09-03-23 @ 08:00)  SpO2: 94% (09-03-23 @ 08:00)  Wt(kg): --    09-02 @ 07:01  -  09-03 @ 07:00  --------------------------------------------------------  IN: 4157.6 mL / OUT: 3943 mL / NET: 214.6 mL    09-03 @ 07:01  -  09-03 @ 09:08  --------------------------------------------------------  IN: 0 mL / OUT: 205 mL / NET: -205 mL          Review of Systems:  ROS negative except as per HPI      PHYSICAL EXAM:  GENERAL: Alert, awake, NAD, sitting up in chair    CHEST/LUNG: Bilateral clear breath sounds  HEART: S1, S2, RRR  ABDOMEN: Soft, ileostomy bags  : [place] draining yellow urine   EXTREMITIES: no edema  Neurology: AAOx3, no focal neurological deficits noted      LABS:                        8.0    9.98  )-----------( 222      ( 03 Sep 2023 04:12 )             26.0     09-03    131<L>  |  102  |  39<H>  ----------------------------<  121<H>  3.6   |  20<L>  |  3.43<H>    Ca    8.4      03 Sep 2023 04:12  Phos  3.7     09-03  Mg     1.7     09-03    TPro  7.4  /  Alb  2.0<L>  /  TBili  3.6<H>  /  DBili  x   /  AST  38  /  ALT  36  /  AlkPhos  173<H>  09-02        Urinalysis Basic - ( 03 Sep 2023 04:12 )    Color: x / Appearance: x / SG: x / pH: x  Gluc: 121 mg/dL / Ketone: x  / Bili: x / Urobili: x   Blood: x / Protein: x / Nitrite: x   Leuk Esterase: x / RBC: x / WBC x   Sq Epi: x / Non Sq Epi: x / Bacteria: x      Sodium, Random Urine: 73 mmol/L (09-02 @ 08:46)  Creatinine, Random Urine: 21 mg/dL (09-02 @ 08:46)  Protein/Creatinine Ratio Calculation: 4.0 Ratio (09-02 @ 08:46)  Osmolality, Random Urine: 200 mosm/kg (09-02 @ 08:46)  Potassium, Random Urine: 5 mmol/L (09-02 @ 08:46)  Sodium, Random Urine: 81 mmol/L (09-02 @ 07:02)  Creatinine, Random Urine: 19 mg/dL (09-02 @ 07:02)  Protein/Creatinine Ratio Calculation: 2.0 Ratio (09-02 @ 07:02)  Osmolality, Random Urine: 193 mosm/kg (09-02 @ 07:02)  Potassium, Random Urine: 3 mmol/L (09-02 @ 07:02)  Sodium, Random Urine: 58 mmol/L (09-01 @ 20:03)  Creatinine, Random Urine: 40 mg/dL (09-01 @ 20:03)  Protein/Creatinine Ratio Calculation: 1.4 Ratio (09-01 @ 20:03)  Osmolality, Random Urine: 178 mosm/kg (09-01 @ 20:03)  Potassium, Random Urine: 9 mmol/L (09-01 @ 20:03)  Sodium, Random Urine: 36 mmol/L (09-01 @ 18:23)  Creatinine, Random Urine: 96 mg/dL (09-01 @ 18:23)  Osmolality, Random Urine: 193 mosm/kg (09-01 @ 18:23)        RADIOLOGY & ADDITIONAL STUDIES:

## 2023-09-03 NOTE — PROGRESS NOTE ADULT - SUBJECTIVE AND OBJECTIVE BOX
[employer] Attending Coverage for Dr Peterson    VAC changed overnight due to output leakage  Continue to have good urine output  Fistula pouch/drain with high output, currently on imodium and lomotil.  NPO/on TPN    Patient currently feels well and is without complaints    ICU Vital Signs Last 24 Hrs  T(C): 37.2 (03 Sep 2023 06:00), Max: 37.2 (02 Sep 2023 09:00)  T(F): 99 (03 Sep 2023 06:00), Max: 99 (03 Sep 2023 06:00)  HR: 84 (03 Sep 2023 08:00) (81 - 88)  BP: 110/55 (03 Sep 2023 08:00) (79/51 - 161/69)  BP(mean): 76 (03 Sep 2023 08:00) (59 - 99)  ABP: --  ABP(mean): --  RR: 17 (03 Sep 2023 08:00) (17 - 33)  SpO2: 94% (03 Sep 2023 08:00) (92% - 100%)    O2 Parameters below as of 03 Sep 2023 09:00  Patient On (Oxygen Delivery Method): room air      I&O's Detail    02 Sep 2023 07:01  -  03 Sep 2023 07:00  --------------------------------------------------------  IN:    Fat Emulsion (Fish Oil &amp; Plant Based) 20% Infusion: 41.6 mL    Fat Emulsion (Fish Oil &amp; Plant Based) 20% Infusion: 252 mL    Oral Fluid: 210 mL    sodium chloride 0.9%: 1300 mL    Sodium Chloride 0.9% Bolus: 1000 mL    TPN (Total Parenteral Nutrition): 1354 mL  Total IN: 4157.6 mL    OUT:    Bulb (mL): 28 mL    Drain (mL): 1225 mL    Drain (mL): 150 mL    Ileostomy (mL): 60 mL    Indwelling Catheter - Urethral (mL): 2155 mL    VAC (Vacuum Assisted Closure) System (mL): 325 mL  Total OUT: 3943 mL    Total NET: 214.6 mL      03 Sep 2023 07:01  -  03 Sep 2023 08:53  --------------------------------------------------------  IN:  Total IN: 0 mL    OUT:    Drain (mL): 125 mL    Indwelling Catheter - Urethral (mL): 80 mL  Total OUT: 205 mL    Total NET: -205 mL        Gen NAD, alert and oriented  Comfortable in chair  Abdomen soft, midline wound manager and VAC in place without leak, (+) ostomy, nontender                          8.0    9.98  )-----------( 222      ( 03 Sep 2023 04:12 )             26.0   09-03    131<L>  |  102  |  39<H>  ----------------------------<  121<H>  3.6   |  20<L>  |  3.43<H>    Ca    8.4      03 Sep 2023 04:12  Phos  3.7     09-03  Mg     1.7     09-03    TPro  7.4  /  Alb  2.0<L>  /  TBili  3.6<H>  /  DBili  x   /  AST  38  /  ALT  36  /  AlkPhos  173<H>  09-02

## 2023-09-03 NOTE — PROGRESS NOTE ADULT - ATTENDING COMMENTS
77 yo M w/ extensive and prolonged hospital course as above related to complications 2/2 Crohn's now transferred back to SICU for acute symptomatic hyponatremia and nonoliguric renal failure, likely multifactorial 2/2 excessive volume loss from high ostomy output, hypovolemia, TPN fluids. W/ fluid resuscitation and hypertonic saline sodium has corrected nicely, remains on IVF for continuing high output via drain. H/H decline noted, will monitor closely and consider transfusion given pt orthostatic positive if it continues to decline. Otherwise, attempt to maintain euvolemia. UOP remains adequate, incompletely accurate as he is leaking around mccauley. Remove mccauley and place condom cath. ELVI likely related to hypovolemia, lateral today but nonoliguric. Starting octreotide for high output per surgery.     Direct personal management at bed side and extensive interpretation of the data.  Plan was outlined and discussed in details with the housestaff.  Decision making of highest complexity  Action taken for acute disease activity to reflect the level of care provided:  - medication reconciliation  - review laboratory data

## 2023-09-03 NOTE — PROGRESS NOTE ADULT - ASSESSMENT
79 yo M w/ prolonged hospital course, originally admitted for SBO and CD flare, hospital course c/b multiple GI surgeries, wound dehiscences, ileostomy EC fistula. Nephrology consulted 9/1 for oliguric ATN     #Non-oliguric iATN   Cr had been in 0.9-1.0 range   today at 3.43  Relative hypotension, BP dropping to 90s, 100s systolic compared to 130s-140s before  GI output also increased since 8/30  UA consistent with ATN, hematuria likely due to traumatic mccauley placement   Drains around 1750cc, overall pos FB 200cc         Recommend:  Would run NS at 120cc/hr and maintain pos FB above drain output   Strict ins and outs  Ensure BP >120 for adequate renal perfusion  BMP per CCU protocol

## 2023-09-03 NOTE — PROGRESS NOTE ADULT - ASSESSMENT
78 M w/ Crohn's, AFib/Flutter s/p DCCVs on amiodarone, remote ileocectomy and open appendectomy. Admitted (6/23) for SBO vs Crohns flare, s/p NGT decompression and s/p lap TRE converted to open TRE, SBR x 3, left in discontinuity with abthera vac on (6/27), RTOR for ileocolic resection, small bowel anastomosis, and abdominal wall closure on (6/28), c/b fluid collection s/p IR aspiration of perihepatic fluid on (7/3), c/b wound dehiscence s/p RTOR exlap, washout, ileocolic resection with end ileostomy, blow hole colostomy, red rubber from ileostomy to small bowel anastomosis; vicryl bridging mesh on (7/5). Went to SICU but extubated 7/6 and SDU 8/2. Has been recovering on telemetry with ECF management and prolonged TPN. Returns to SICU 8/23 with AMS and concern for sepsis now back SDU. Stepped back to SICU 9/1 for oliguria, severe ELVI and hyponatremia (9/1). Now voiding adequately, with improvement of Na 131.     NEURO: A&Ox3. Previous concerns with AMS 2/2 likely anticholingeric effects from scopolamine resolved. Hx of depression - Continue outpatient effexor. Pain control with Dilaudid for vac changes.   CV: Clinically appears hypovolemic with dry mucus membranes. Hx Afib/flutter: s/p DCCV, Off amiodarone given transaminitis and elevated T bili, Hx of AFib - Continue Metoprolol 25 BID. Hx HTN - Holding Norvasc, Losartan. Hx HLD: hold Lipitor given LFTs. TTE  (7/18) - PASP 64mmHg, EF 65-70%,   PULM: No concerns currently. Saturating well on RA. Monitor for fluid overload after resuscitation.  GI/FEN: Previously on CLD with high fistula/wound vac output - started on Immodium 4q6 yesterday. Lomotil. Restarted Octreotide 100mcg q8. Now NPO/ NS @120 with TPN/PICC. Transaminitis of unclear etiology: distended gallbladder on CT 8/25, RUQ US negative. Monitor output from EC fistula and Wound vac daily.  : Severe ELVI (bl Cr 0.95, now >3). Ulytes and UA pending. Mccauley placed for strict Is and Os. D/c mccauley this AM. RP U/S pending. Nephrology recommendations appreciated. Hyponatremia: last Na 122 - resuscitated today with NS. F/U STAT BMP  ENDO: . Hx hypothyroid: IV Synthroid, TSH 9 (8/23), recheck in 2-3 weeks. Recurrent Hypoglycemia: on D5LR currently.   ID: Numerous SICU admissions from sepsis, most recently with concern for line sepsis. Now WBC dowtrended. Currently off abx. ////   CTAP (8/25) without fluid collections, distended GB, atelectasis. Restart Imipenem (8/26--).  Previously had C. tertium, Lactobacillis from his IR cx 7/3, and candida albicans, lactobacillus, vanc sensitive E faecium, vanc resistant E gallinarum, vanc resistant E casseliflavus, lactobacillus from his OR cx 7/5. Completed course of abx with imipenem (6/30-7/12, 7/23-7/30), Dapto (6/30-7/5 and 7/23-7/24). been off all abx since 7/30. empiric dapto (8/23-24) and cefepime (8/23-24).   PPX: SCDs, SQH  LINES: PIVs, LUE PICC (9/1 - )  WOUNDS/DRAINS: ECF abdominal wound with vac change Mon/Fri--next on 9/5. PT: Home health PT  DISPO: SICU

## 2023-09-03 NOTE — PROGRESS NOTE ADULT - ASSESSMENT
Continue NPO, TPN  Monitor ostomy and fistula pouch/drain outputs. Currently on max dose imodium and lomotil, recommend starting octreotide if no contraindication. Replete fluid losses from outputs.  Nephrology care appreciated  ICU care appreciated  Continue local wound care to midline  Discussed with chief resident and ICU team

## 2023-09-03 NOTE — PROGRESS NOTE ADULT - SUBJECTIVE AND OBJECTIVE BOX
INTERVAL HPI/OVERNIGHT EVENTS:  ON: vac leaked, taken down and re dressed, +394 at 6am- no bolus    STATUS POST:    6/27: Laparoscopic lysis of adhesions, converted to open lysis of adhesions, SBR x 3, temporary abdominal closure, 5L cryst, 1L 5% alb, 3 pRBC, 1 FFP, 1 plts  6/28: ex lap, removal of abthera, ileocolic resection, small bowel anastamosis, , 1.6 crystalloid, 250 5%, 175 uop   7/3: IR Gendel drained perihepatic aspiration of serous fluid.   7/5: RTOR exlap, washout, ileocolic resection with end ileostomy, blow hole colostomy, fistula, red rubber from ileostomy to small bowel anastomosis; vicryl bridging mesh; R JOYCE below ileostomy, L JOYCE at small bowel enterotomy repair; 500 LR, 500 5% albumin, 3u PRBC, 2 FFP, 400 UOP,     SUBJECTIVE:  Patient seen and examined by chief resident and team on AM rounds. Patient reports feeling well. Denied any nausea or vomiting. Pain well controlled. Restarted octreotide for high output from fistula. Na improved to 131. Cr remains lateral with slight downtrend at 3.4. Voiding adequately.    MEDICATIONS  (STANDING):  chlorhexidine 2% Cloths 1 Application(s) Topical <User Schedule>  cholestyramine Powder (Sugar-Free) 4 Gram(s) Oral two times a day  dextrose 5%. 1000 milliLiter(s) (50 mL/Hr) IV Continuous <Continuous>  dextrose 50% Injectable 25 Gram(s) IV Push once  dextrose 50% Injectable 25 Gram(s) IV Push once  diphenoxylate/atropine 2 Tablet(s) Oral every 6 hours  glucagon  Injectable 1 milliGRAM(s) IntraMuscular once  glucagon  Injectable 1 milliGRAM(s) IntraMuscular once  heparin   Injectable 5000 Unit(s) SubCutaneous every 8 hours  insulin lispro (ADMELOG) corrective regimen sliding scale   SubCutaneous every 6 hours  levothyroxine Injectable 40 MICROGram(s) IV Push <User Schedule>  lipid, fat emulsion (Fish Oil and Plant Based) 20% Infusion 0.4999 Gm/kG/Day (20.83 mL/Hr) IV Continuous <Continuous>  loperamide 4 milliGRAM(s) Oral every 6 hours  metoprolol tartrate 25 milliGRAM(s) Oral every 12 hours  nystatin Powder 1 Application(s) Topical three times a day  octreotide  Injectable 100 MICROGram(s) IV Push every 8 hours  pantoprazole  Injectable 40 milliGRAM(s) IV Push daily  Parenteral Nutrition - Adult 1 Each (67 mL/Hr) TPN Continuous <Continuous>  Parenteral Nutrition - Adult 1 Each (67 mL/Hr) TPN Continuous <Continuous>  sodium chloride 0.9%. 1000 milliLiter(s) (120 mL/Hr) IV Continuous <Continuous>  venlafaxine XR. 300 milliGRAM(s) Oral daily    MEDICATIONS  (PRN):  benzocaine/menthol Lozenge 2 Lozenge Oral every 4 hours PRN Sore Throat  dextrose Oral Gel 15 Gram(s) Oral once PRN Blood Glucose LESS THAN 70 milliGRAM(s)/deciliter  melatonin 5 milliGRAM(s) Oral at bedtime PRN Sleep      Vital Signs Last 24 Hrs  T(C): 36.4 (03 Sep 2023 09:00), Max: 37.2 (03 Sep 2023 06:00)  T(F): 97.6 (03 Sep 2023 09:00), Max: 99 (03 Sep 2023 06:00)  HR: 80 (03 Sep 2023 12:00) (80 - 88)  BP: 99/55 (03 Sep 2023 12:00) (79/51 - 161/69)  BP(mean): 72 (03 Sep 2023 12:00) (59 - 99)  RR: 16 (03 Sep 2023 12:00) (16 - 33)  SpO2: 99% (03 Sep 2023 12:00) (92% - 100%)    Parameters below as of 03 Sep 2023 13:00  Patient On (Oxygen Delivery Method): room air        PHYSICAL EXAM:  GENERAL: NAD, resting comfortably in bed. AxOx3, following commands.   HEENT: MMM. Icteric sclerae.  C/V: RRR, normal perfusion.   PULM: Nonlabored breathing on RA, no respiratory distress, CTA b/l  ABD: Soft, NTND abdomen, no rebound tenderness, no guarding. Wound vac in place to suction, with well-sealed Dustin pouch over isolated fistula draining bilious output. 26 Fr mccauley catheter within fistula, to suction. RUQ ostomy bag with serous output.   : mccauley in place with clear yellow urine.   EXTREM: WWP, no edema, SCDs in place  NEURO: No focal deficits        I&O's Detail    02 Sep 2023 07:01  -  03 Sep 2023 07:00  --------------------------------------------------------  IN:    Fat Emulsion (Fish Oil &amp; Plant Based) 20% Infusion: 41.6 mL    Fat Emulsion (Fish Oil &amp; Plant Based) 20% Infusion: 252 mL    Oral Fluid: 210 mL    sodium chloride 0.9%: 1500 mL    Sodium Chloride 0.9% Bolus: 1000 mL    TPN (Total Parenteral Nutrition): 1421 mL  Total IN: 4424.6 mL    OUT:    Bulb (mL): 28 mL    Drain (mL): 1225 mL    Drain (mL): 150 mL    Ileostomy (mL): 60 mL    Indwelling Catheter - Urethral (mL): 2155 mL    VAC (Vacuum Assisted Closure) System (mL): 325 mL  Total OUT: 3943 mL    Total NET: 481.6 mL      03 Sep 2023 07:01  -  03 Sep 2023 12:58  --------------------------------------------------------  IN:    IV PiggyBack: 50 mL    sodium chloride 0.9%: 100 mL    sodium chloride 0.9%: 600 mL    TPN (Total Parenteral Nutrition): 402 mL  Total IN: 1152 mL    OUT:    Drain (mL): 375 mL    Ileostomy (mL): 10 mL    Indwelling Catheter - Urethral (mL): 205 mL    Voided (mL): 125 mL  Total OUT: 715 mL    Total NET: 437 mL          LABS:                        8.0    9.98  )-----------( 222      ( 03 Sep 2023 04:12 )             26.0     09-03    131<L>  |  102  |  39<H>  ----------------------------<  121<H>  3.6   |  20<L>  |  3.43<H>    Ca    8.4      03 Sep 2023 04:12  Phos  3.7     09-03  Mg     1.7     09-03    TPro  7.4  /  Alb  2.0<L>  /  TBili  3.6<H>  /  DBili  x   /  AST  38  /  ALT  36  /  AlkPhos  173<H>  09-02      Urinalysis Basic - ( 03 Sep 2023 04:12 )    Color: x / Appearance: x / SG: x / pH: x  Gluc: 121 mg/dL / Ketone: x  / Bili: x / Urobili: x   Blood: x / Protein: x / Nitrite: x   Leuk Esterase: x / RBC: x / WBC x   Sq Epi: x / Non Sq Epi: x / Bacteria: x        RADIOLOGY & ADDITIONAL STUDIES:

## 2023-09-03 NOTE — PROGRESS NOTE ADULT - ASSESSMENT
78 M w/ Crohn's, AFib/Flutter s/p DCCVs on amiodarone, remote ileocectomy and open appendectomy. Admitted (6/23) for SBO vs Crohns flare, s/p NGT decompression and s/p lap TRE converted to open TRE, SBR x 3, left in discontinuity with abthera vac on (6/27), RTOR for ileocolic resection, small bowel anastomosis, and abdominal wall closure on (6/28), c/b fluid collection s/p IR aspiration of perihepatic fluid on (7/3), c/b wound dehiscence s/p RTOR exlap, washout, ileocolic resection with end ileostomy, blow hole colostomy, red rubber from ileostomy to small bowel anastomosis; vicryl bridging mesh on (7/5). Went to SICU but extubated 7/6 and SDU 8/2. Has been recovering on telemetry with ECF management and prolonged TPN. Returns to SICU 8/23 with AMS and concern for sepsis now back SDU. Stepped back to SICU 9/1 for oliguria, severe ELVI and hyponatremia (9/1).     Restart octreotide for high fistula output, currently on imodium & lomotil  NPO/IVF  TPN/PICC  Rest of care per SICU

## 2023-09-03 NOTE — PROGRESS NOTE ADULT - SUBJECTIVE AND OBJECTIVE BOX
SUBJECTIVE: Patient was seen and examined by chief resident. Patient resting comfortably in bed with no acute complaints.      Vital Signs Last 24 Hrs  T(C): 36.4 (03 Sep 2023 09:00), Max: 37.2 (03 Sep 2023 06:00)  T(F): 97.6 (03 Sep 2023 09:00), Max: 99 (03 Sep 2023 06:00)  HR: 80 (03 Sep 2023 12:00) (80 - 88)  BP: 99/55 (03 Sep 2023 12:00) (79/51 - 161/69)  BP(mean): 72 (03 Sep 2023 12:00) (59 - 99)  RR: 16 (03 Sep 2023 12:00) (16 - 33)  SpO2: 99% (03 Sep 2023 12:00) (92% - 100%)    Parameters below as of 03 Sep 2023 13:00  Patient On (Oxygen Delivery Method): room air        I&O's Summary    02 Sep 2023 07:01  -  03 Sep 2023 07:00  --------------------------------------------------------  IN: 4424.6 mL / OUT: 3943 mL / NET: 481.6 mL    03 Sep 2023 07:01  -  03 Sep 2023 13:15  --------------------------------------------------------  IN: 1152 mL / OUT: 715 mL / NET: 437 mL        Physical Exam:  General Appearance: Appears well, NAD  Pulmonary: Nonlabored breathing, no respiratory distress  Cardiovascular: NSR  Abdomen:   Extremities: WWP, SCD's in place     LABS:                        8.7    10.56 )-----------( 251      ( 03 Sep 2023 12:26 )             27.2     09-03    131<L>  |  102  |  39<H>  ----------------------------<  121<H>  3.6   |  20<L>  |  3.43<H>    Ca    8.4      03 Sep 2023 04:12  Phos  3.7     09-03  Mg     1.7     09-03    TPro  7.4  /  Alb  2.0<L>  /  TBili  3.6<H>  /  DBili  x   /  AST  38  /  ALT  36  /  AlkPhos  173<H>  09-02      Urinalysis Basic - ( 03 Sep 2023 04:12 )    Color: x / Appearance: x / SG: x / pH: x  Gluc: 121 mg/dL / Ketone: x  / Bili: x / Urobili: x   Blood: x / Protein: x / Nitrite: x   Leuk Esterase: x / RBC: x / WBC x   Sq Epi: x / Non Sq Epi: x / Bacteria: x       SUBJECTIVE: Patient was seen and examined by chief resident. Patient resting comfortably in bed with no acute complaints.      Vital Signs Last 24 Hrs  T(C): 36.4 (03 Sep 2023 09:00), Max: 37.2 (03 Sep 2023 06:00)  T(F): 97.6 (03 Sep 2023 09:00), Max: 99 (03 Sep 2023 06:00)  HR: 80 (03 Sep 2023 12:00) (80 - 88)  BP: 99/55 (03 Sep 2023 12:00) (79/51 - 161/69)  BP(mean): 72 (03 Sep 2023 12:00) (59 - 99)  RR: 16 (03 Sep 2023 12:00) (16 - 33)  SpO2: 99% (03 Sep 2023 12:00) (92% - 100%)    Parameters below as of 03 Sep 2023 13:00  Patient On (Oxygen Delivery Method): room air        I&O's Summary    02 Sep 2023 07:01  -  03 Sep 2023 07:00  --------------------------------------------------------  IN: 4424.6 mL / OUT: 3943 mL / NET: 481.6 mL    03 Sep 2023 07:01  -  03 Sep 2023 13:15  --------------------------------------------------------  IN: 1152 mL / OUT: 715 mL / NET: 437 mL        Physical Exam:  General Appearance: Appears well, NAD  Pulmonary: Nonlabored breathing, no respiratory distress  Cardiovascular: NSR  Abdomen: soft, NT, ileostomy w minimal output, wound vac in place, Dustin pouch containing EC fistula & 26 F catheter to LIWS  Extremities: WWP, SCD's in place     LABS:                        8.7    10.56 )-----------( 251      ( 03 Sep 2023 12:26 )             27.2     09-03    131<L>  |  102  |  39<H>  ----------------------------<  121<H>  3.6   |  20<L>  |  3.43<H>    Ca    8.4      03 Sep 2023 04:12  Phos  3.7     09-03  Mg     1.7     09-03    TPro  7.4  /  Alb  2.0<L>  /  TBili  3.6<H>  /  DBili  x   /  AST  38  /  ALT  36  /  AlkPhos  173<H>  09-02      Urinalysis Basic - ( 03 Sep 2023 04:12 )    Color: x / Appearance: x / SG: x / pH: x  Gluc: 121 mg/dL / Ketone: x  / Bili: x / Urobili: x   Blood: x / Protein: x / Nitrite: x   Leuk Esterase: x / RBC: x / WBC x   Sq Epi: x / Non Sq Epi: x / Bacteria: x

## 2023-09-04 NOTE — PROGRESS NOTE ADULT - ASSESSMENT
78 M w/ Crohn's, AFib/Flutter s/p DCCVs on amiodarone, remote ileocectomy and open appendectomy. Admitted (6/23) for SBO vs Crohns flare, s/p NGT decompression and s/p lap TRE converted to open TRE, SBR x 3, left in discontinuity with abthera vac on (6/27), RTOR for ileocolic resection, small bowel anastomosis, and abdominal wall closure on (6/28), c/b fluid collection s/p IR aspiration of perihepatic fluid on (7/3), c/b wound dehiscence s/p RTOR exlap, washout, ileocolic resection with end ileostomy, blow hole colostomy, red rubber from ileostomy to small bowel anastomosis; vicryl bridging mesh on (7/5). hospital course include interval development of EC fistulas and prolonged TPN for nutritional support, brief episode of AMS 8/23 (resolved within 1day), back to SICU 9/1 for hyponatremia (Na 122), oliguric ELVI, concern for hypovolemia,     NEURO: A&Ox3. Previous concerns with AMS 2/2 likely anticholingeric effects from scopolamine resolved. Hx of depression - Continue outpatient effexor. Pain control with Dilaudid for vac changes.   CV: Clinically appears hypovolemic with dry mucus membranes. Hx Afib/flutter: s/p DCCV, Off amiodarone given transaminitis and elevated T bili, Hx of AFib - Continue Metoprolol 25 BID. Hx HTN - Holding Norvasc, Losartan. Hx HLD: hold Lipitor given LFTs. TTE  (7/18) - PASP 64mmHg, EF 65-70%,   PULM: No concerns currently. Saturating well on RA. Monitor for fluid overload after resuscitation.  GI/FEN: Previously on CLD with high fistula/wound vac output - started on Immodium 4q6 yesterday. Lomotil. Restarted Octreotide 100mcg q8. Now NPO/ NS @120 with TPN/PICC. Transaminitis of unclear etiology: distended gallbladder on CT 8/25, RUQ US negative. Monitor output from EC fistula and Wound vac daily.  : Severe ELVI (bl Cr 0.95, now >3). Ulytes and UA pending. Mccauley placed for strict Is and Os. D/c mccauley this AM. RP U/S pending. Nephrology recommendations appreciated. Hyponatremia: last Na 122 - resuscitated today with NS. F/U STAT BMP  ENDO:  Hx hypothyroid: IV Synthroid, TSH 9 (8/23), recheck in 2-3 weeks. Recurrent Hypoglycemia: on D5LR currently.   ID: Numerous SICU admissions from sepsis, most recently with concern for line sepsis. Now WBC dowtrended. Currently off abx. ////   CTAP (8/25) without fluid collections, distended GB, atelectasis. Restart Imipenem (8/26--).  Previously had C. tertium, Lactobacillis from his IR cx 7/3, and candida albicans, lactobacillus, vanc sensitive E faecium, vanc resistant E gallinarum, vanc resistant E casseliflavus, lactobacillus from his OR cx 7/5. Completed course of abx with imipenem (6/30-7/12, 7/23-7/30), Dapto (6/30-7/5 and 7/23-7/24). been off all abx since 7/30. empiric dapto (8/23-24) and cefepime (8/23-24).   PPX: SCDs, SQH  LINES: PIVs, LUE PICC (9/1 - )  WOUNDS/DRAINS: ECF abdominal wound with vac change Mon/Fri--next on 9/5. PT: Home health PT  DISPO: SICU   78 M w/ Crohn's, AFib/Flutter s/p DCCVs on amiodarone, remote ileocectomy and open appendectomy. Admitted (6/23) for SBO vs Crohns flare, s/p NGT decompression and s/p lap TRE converted to open TRE, SBR x 3, left in discontinuity with abthera vac on (6/27), RTOR for ileocolic resection, small bowel anastomosis, and abdominal wall closure on (6/28), c/b fluid collection s/p IR aspiration of perihepatic fluid on (7/3), c/b wound dehiscence s/p RTOR exlap, washout, ileocolic resection with end ileostomy, blow hole colostomy, red rubber from ileostomy to small bowel anastomosis; vicryl bridging mesh on (7/5). hospital course include interval development of EC fistulas and prolonged TPN for nutritional support, brief episode of AMS 8/23 (resolved within 1day), back to SICU 9/1 for hyponatremia (Na 122), oliguric ELVI, concern for hypovolemia,     NEURO: recent altered mental status possibly due to scopolamine patch. Hx of depression - Continue outpatient effexor. Pain control with Dilaudid for vac changes.   CV: Hx Afib/flutter: s/p DCCV, Off amiodarone given transaminitis and elevated T bili, Hx of AFib - Continue Metoprolol 25 BID. Hx HTN - Holding Norvasc, Losartan. Hx HLD: hold Lipitor given LFTs. TTE  (7/18) - PASP 64mmHg, EF 65-70%,   PULM: no resp distress on room air.   GI/FEN: Previously on CLD with subsequent high fistula/wound vac output, currently NPO, started on Immodium lomotil and Restarted Octreotide 100mcg q8. fistula/vac output had slowed down a bit. cont TPN/PICC. hyponatremia had improved.  Transaminitis of unclear etiology: distended gallbladder on CT 8/25, RUQ US negative. Monitor output from EC fistula and Wound vac daily.   : ELVI, now having adequate UO. Nephrology recommendations appreciated. Hyponatremia improved.   ENDO: mISS. Hx hypothyroid: IV Synthroid, TSH 9 (8/23), recheck in 2-3 weeks.   ID: Currently off abx.   PREVIOUSLY: C. tertium, Lactobacillis from his IR cx 7/3, and candida albicans, lactobacillus, vanc sensitive E faecium, vanc resistant E gallinarum, vanc resistant E casseliflavus, lactobacillus from his OR cx 7/5. completed imipenem (6/30-7/12, 7/23-7/30), Dapto (6/30-7/5 and 7/23-7/24, 8/23-8/24), cefepime (8/23-24).   PPX: SCDs, SQH.   LINES: PIVs, LUE PICC (9/1 - )  WOUNDS/DRAINS: ECF abdominal wound with vac change Mon/Fri--next vac change on 9/5. PT: Home health PT  DISPO: SICU

## 2023-09-04 NOTE — PROGRESS NOTE ADULT - SUBJECTIVE AND OBJECTIVE BOX
S: No new issues/events overnight, no new med c/o    O: ICU Vital Signs Last 24 Hrs  T(F): 97.7 (09-04-23 @ 09:00), Max: 97.7 (09-03-23 @ 17:51)  HR: 82 (09-04-23 @ 11:00) (79 - 83)  BP: 118/58 (09-04-23 @ 11:00) (97/53 - 139/65)  BP(mean): 84 (09-04-23 @ 11:00) (70 - 98)  ABP: --  RR: 22 (09-04-23 @ 11:00) (18 - 39)  SpO2: 99% (09-04-23 @ 11:00) (94% - 100%)    PHYSICAL EXAM:   GENERAL: NAD, resting comfortably in bed. AxOx3, following commands.   HEENT: MMM.  C/V: RRR, normal perfusion.   PULM: Nonlabored breathing on RA, no respiratory distress, CTA b/l  ABD: Soft, NT, ND abdomen, no rebound tenderness, no guarding. Wound vac in place to suction, with well-sealed Dustin pouch over isolated fistula draining bilious output. 26 Fr mccauley catheter within fistula, to suction. RUQ ostomy bag with serous output.   EXTREM: WWP, no edema, SCDs in place  NEURO: No focal deficits    LABS:    09-04    133<L>  |  101  |  38<H>  ----------------------------<  143<H>  3.6   |  25  |  3.13<H>    Ca    8.0<L>      04 Sep 2023 06:02  Phos  3.1     09-04  Mg     2.1     09-04    TPro  6.8  /  Alb  1.9<L>  /  TBili  3.0<H>  /  DBili  2.1<H>  /  AST  39  /  ALT  31  /  AlkPhos  179<H>  09-04  LIVER FUNCTIONS - ( 04 Sep 2023 06:02 )  Alb: 1.9 g/dL / Pro: 6.8 g/dL / ALK PHOS: 179 U/L / ALT: 31 U/L / AST: 39 U/L / GGT: x                               7.8    10.91 )-----------( 225      ( 04 Sep 2023 06:02 )             25.1     Urinalysis Basic - ( 04 Sep 2023 06:02 )    Color: x / Appearance: x / SG: x / pH: x  Gluc: 143 mg/dL / Ketone: x  / Bili: x / Urobili: x   Blood: x / Protein: x / Nitrite: x   Leuk Esterase: x / RBC: x / WBC x   Sq Epi: x / Non Sq Epi: x / Bacteria: x    CAPILLARY BLOOD GLUCOSE      POCT Blood Glucose.: 154 mg/dL (04 Sep 2023 05:40)  POCT Blood Glucose.: 154 mg/dL (03 Sep 2023 23:16)  POCT Blood Glucose.: 129 mg/dL (03 Sep 2023 18:10)    MEDICATIONS  (STANDING):  chlorhexidine 2% Cloths 1 Application(s) Topical <User Schedule>  cholestyramine Powder (Sugar-Free) 4 Gram(s) Oral two times a day  diphenoxylate/atropine 2 Tablet(s) Oral every 6 hours  glucagon  Injectable 1 milliGRAM(s) IntraMuscular once  glucagon  Injectable 1 milliGRAM(s) IntraMuscular once  heparin   Injectable 5000 Unit(s) SubCutaneous every 8 hours  insulin lispro (ADMELOG) corrective regimen sliding scale   SubCutaneous every 6 hours  levothyroxine Injectable 40 MICROGram(s) IV Push <User Schedule>  lipid, fat emulsion (Fish Oil and Plant Based) 20% Infusion 0.4999 Gm/kG/Day (20.83 mL/Hr) IV Continuous <Continuous>  loperamide 4 milliGRAM(s) Oral every 6 hours  metoprolol tartrate 25 milliGRAM(s) Oral every 12 hours  nystatin Powder 1 Application(s) Topical three times a day  octreotide  Injectable 100 MICROGram(s) IV Push every 8 hours  pantoprazole  Injectable 40 milliGRAM(s) IV Push daily  Parenteral Nutrition - Adult 1 Each (67 mL/Hr) TPN Continuous <Continuous>  Parenteral Nutrition - Adult 1 Each (67 mL/Hr) TPN Continuous <Continuous>  potassium chloride   Powder 40 milliEquivalent(s) Oral once  venlafaxine XR. 300 milliGRAM(s) Oral daily    MEDICATIONS  (PRN):  benzocaine/menthol Lozenge 2 Lozenge Oral every 4 hours PRN Sore Throat  dextrose Oral Gel 15 Gram(s) Oral once PRN Blood Glucose LESS THAN 70 milliGRAM(s)/deciliter  melatonin 5 milliGRAM(s) Oral at bedtime PRN Sleep      Mccauley:	  [x ] None	[ ] Daily Mccauley Order Placed	   Indication:	  [ ] Strict I and O's    [ ] Obstruction     [ ] Incontinence + Stage 3 or 4 Decubitus  Central Line:  [ ] None	   [ x]  Medication / TPN Administration     [ ] No Peripheral IV

## 2023-09-04 NOTE — PROGRESS NOTE ADULT - SUBJECTIVE AND OBJECTIVE BOX
Patient is a 78y Male seen and evaluated at bedside. No acute distress, feels much better, sCr improving, improved BP, Net pos 2.5L, output from drainage decreased to 1L       Meds:    benzocaine/menthol Lozenge 2 every 4 hours PRN  chlorhexidine 2% Cloths 1 <User Schedule>  cholestyramine Powder (Sugar-Free) 4 two times a day  dextrose 5%. 1000 <Continuous>  dextrose 50% Injectable 25 once  dextrose 50% Injectable 25 once  dextrose Oral Gel 15 once PRN  diphenoxylate/atropine 2 every 6 hours  glucagon  Injectable 1 once  glucagon  Injectable 1 once  heparin   Injectable 5000 every 8 hours  insulin lispro (ADMELOG) corrective regimen sliding scale  every 6 hours  levothyroxine Injectable 40 <User Schedule>  lipid, fat emulsion (Fish Oil and Plant Based) 20% Infusion 0.4999 <Continuous>  loperamide 4 every 6 hours  melatonin 5 at bedtime PRN  metoprolol tartrate 25 every 12 hours  nystatin Powder 1 three times a day  octreotide  Injectable 100 every 8 hours  pantoprazole  Injectable 40 daily  Parenteral Nutrition - Adult 1 <Continuous>  Parenteral Nutrition - Adult 1 <Continuous>  potassium chloride   Powder 40 once  venlafaxine XR. 300 daily      T(C): , Max: 36.5 (09-03-23 @ 17:51)  T(F): , Max: 97.7 (09-03-23 @ 17:51)  HR: 80 (09-04-23 @ 10:00)  BP: 115/56 (09-04-23 @ 10:00)  BP(mean): 81 (09-04-23 @ 10:00)  RR: 36 (09-04-23 @ 10:00)  SpO2: 100% (09-04-23 @ 10:00)  Wt(kg): --    09-03 @ 07:01  -  09-04 @ 07:00  --------------------------------------------------------  IN: 4908.4 mL / OUT: 2375 mL / NET: 2533.4 mL    09-04 @ 07:01  -  09-04 @ 11:10  --------------------------------------------------------  IN: 581.8 mL / OUT: 200 mL / NET: 381.8 mL          Review of Systems:  ROS negative except as per HPI      PHYSICAL EXAM:  GENERAL: Alert, awake, NAD, sitting up in chair    CHEST/LUNG: Bilateral clear breath sounds  HEART: S1, S2, RRR  ABDOMEN: Soft, ileostomy bags  EXTREMITIES: no edema  Neurology: AAOx3, no focal neurological deficits noted      LABS:                        7.8    10.91 )-----------( 225      ( 04 Sep 2023 06:02 )             25.1     09-04    133<L>  |  101  |  38<H>  ----------------------------<  143<H>  3.6   |  25  |  3.13<H>    Ca    8.0<L>      04 Sep 2023 06:02  Phos  3.1     09-04  Mg     2.1     09-04    TPro  6.8  /  Alb  1.9<L>  /  TBili  3.0<H>  /  DBili  2.1<H>  /  AST  39  /  ALT  31  /  AlkPhos  179<H>  09-04        Urinalysis Basic - ( 04 Sep 2023 06:02 )    Color: x / Appearance: x / SG: x / pH: x  Gluc: 143 mg/dL / Ketone: x  / Bili: x / Urobili: x   Blood: x / Protein: x / Nitrite: x   Leuk Esterase: x / RBC: x / WBC x   Sq Epi: x / Non Sq Epi: x / Bacteria: x            RADIOLOGY & ADDITIONAL STUDIES:

## 2023-09-04 NOTE — PROGRESS NOTE ADULT - SUBJECTIVE AND OBJECTIVE BOX
[employer] Attending Coverage for Dr Peterson    Seen at bedside with ICU team  VAC holding suction/no leak, wound manager pouch leaked overnight from lateral aspect.  Good response to addition of octreotide, high output from ostomy/fistula/VAC now downtrending  Continues to have good urine output, passed trial of void  Currently net positive fluid status  Cr downtrending  NPO/on TPN    Patient currently feels well and is without complaints    ICU Vital Signs Last 24 Hrs  T(C): 36.3 (04 Sep 2023 06:10), Max: 36.5 (03 Sep 2023 17:51)  T(F): 97.4 (04 Sep 2023 06:10), Max: 97.7 (03 Sep 2023 17:51)  HR: 83 (04 Sep 2023 08:00) (79 - 83)  BP: 139/63 (04 Sep 2023 08:00) (97/53 - 139/65)  BP(mean): 90 (04 Sep 2023 08:00) (70 - 98)  ABP: --  ABP(mean): --  RR: 24 (04 Sep 2023 08:00) (16 - 39)  SpO2: 94% (04 Sep 2023 08:00) (94% - 100%)    O2 Parameters below as of 04 Sep 2023 08:00  Patient On (Oxygen Delivery Method): room air        I&O's Detail    03 Sep 2023 07:01  -  04 Sep 2023 07:00  --------------------------------------------------------  IN:    Fat Emulsion (Fish Oil &amp; Plant Based) 20% Infusion: 270.4 mL    IV PiggyBack: 50 mL    Oral Fluid: 120 mL    sodium chloride 0.9%: 100 mL    sodium chloride 0.9%: 2760 mL    TPN (Total Parenteral Nutrition): 1608 mL  Total IN: 4908.4 mL    OUT:    Bulb (mL): 5 mL    Drain (mL): 50 mL    Drain (mL): 725 mL    Ileostomy (mL): 10 mL    Indwelling Catheter - Urethral (mL): 205 mL    VAC (Vacuum Assisted Closure) System (mL): 350 mL    Voided (mL): 1030 mL  Total OUT: 2375 mL    Total NET: 2533.4 mL      04 Sep 2023 07:01  -  04 Sep 2023 08:58  --------------------------------------------------------  IN:    Fat Emulsion (Fish Oil &amp; Plant Based) 20% Infusion: 20.8 mL    sodium chloride 0.9%: 120 mL    TPN (Total Parenteral Nutrition): 67 mL  Total IN: 207.8 mL    OUT:    Voided (mL): 100 mL  Total OUT: 100 mL    Total NET: 107.8 mL        Gen NAD, alert and oriented  Comfortable in chair  Abdomen soft, VAC in place without leak, midline wound manager with bilious staining from left lateral aspect, (+) ostomy, (+) JOYCE, nontender                          7.8    10.91 )-----------( 225      ( 04 Sep 2023 06:02 )             25.1   09-04    133<L>  |  101  |  38<H>  ----------------------------<  143<H>  3.6   |  25  |  3.13<H>    Ca    8.0<L>      04 Sep 2023 06:02  Phos  3.1     09-04  Mg     2.1     09-04    TPro  6.8  /  Alb  1.9<L>  /  TBili  3.0<H>  /  DBili  2.1<H>  /  AST  39  /  ALT  31  /  AlkPhos  179<H>  09-04

## 2023-09-04 NOTE — PROGRESS NOTE ADULT - ASSESSMENT
79 yo M w/ prolonged hospital course, originally admitted for SBO and CD flare, hospital course c/b multiple GI surgeries, wound dehiscences, ileostomy EC fistula further c/b iATN, now improving     #Non-oliguric iATN   Cr had been in 0.9-1.0 range  Today at 3.1  Drains decreased to 1L, net pos 2.5L   Improved BP and volume status       Recommend:  Recommend to keep net neutral balance   Ensure BP >120 for adequate renal perfusion  BMP per CCU protocol   Management of high GI output per primary team

## 2023-09-04 NOTE — PROGRESS NOTE ADULT - SUBJECTIVE AND OBJECTIVE BOX
SUBJECTIVE: Patient was seen and examined by chief resident. Patient resting comfortably in the chair with no acute complaints.      Vital Signs Last 24 Hrs  T(C): 36.5 (04 Sep 2023 09:00), Max: 36.5 (03 Sep 2023 17:51)  T(F): 97.7 (04 Sep 2023 09:00), Max: 97.7 (03 Sep 2023 17:51)  HR: 80 (04 Sep 2023 10:00) (79 - 83)  BP: 115/56 (04 Sep 2023 10:00) (97/53 - 139/65)  BP(mean): 81 (04 Sep 2023 10:00) (70 - 98)  RR: 36 (04 Sep 2023 10:00) (16 - 39)  SpO2: 100% (04 Sep 2023 10:00) (94% - 100%)    Parameters below as of 04 Sep 2023 11:00  Patient On (Oxygen Delivery Method): room air        I&O's Summary    03 Sep 2023 07:01  -  04 Sep 2023 07:00  --------------------------------------------------------  IN: 4908.4 mL / OUT: 2375 mL / NET: 2533.4 mL    04 Sep 2023 07:01  -  04 Sep 2023 10:46  --------------------------------------------------------  IN: 581.8 mL / OUT: 200 mL / NET: 381.8 mL        Physical Exam:  General Appearance: Appears well, NAD  Pulmonary: Nonlabored breathing, no respiratory distress  Cardiovascular: NSR  Abdomen: Soft, NT, midline wound vac in place, EC fistula w 26 F catheter to suction, ostomy PPP minimal output  Extremities: P    LABS:                        7.8    10.91 )-----------( 225      ( 04 Sep 2023 06:02 )             25.1     09-04    133<L>  |  101  |  38<H>  ----------------------------<  143<H>  3.6   |  25  |  3.13<H>    Ca    8.0<L>      04 Sep 2023 06:02  Phos  3.1     09-04  Mg     2.1     09-04    TPro  6.8  /  Alb  1.9<L>  /  TBili  3.0<H>  /  DBili  2.1<H>  /  AST  39  /  ALT  31  /  AlkPhos  179<H>  09-04      Urinalysis Basic - ( 04 Sep 2023 06:02 )    Color: x / Appearance: x / SG: x / pH: x  Gluc: 143 mg/dL / Ketone: x  / Bili: x / Urobili: x   Blood: x / Protein: x / Nitrite: x   Leuk Esterase: x / RBC: x / WBC x   Sq Epi: x / Non Sq Epi: x / Bacteria: x

## 2023-09-04 NOTE — PROGRESS NOTE ADULT - ASSESSMENT
Continue NPO, TPN  Monitor ostomy and fistula pouch/drain outputs. Continue imodium, lomotil, octreotide  Nephrology care appreciated  ICU care appreciated  Continue local wound care to midline  Discussed with chief resident

## 2023-09-04 NOTE — PROGRESS NOTE ADULT - ASSESSMENT
78 M w/ Crohn's, AFib/Flutter s/p DCCVs on amiodarone, remote ileocectomy and open appendectomy. Admitted (6/23) for SBO vs Crohns flare, s/p NGT decompression and s/p lap TRE converted to open TRE, SBR x 3, left in discontinuity with abthera vac on (6/27), RTOR for ileocolic resection, small bowel anastomosis, and abdominal wall closure on (6/28), c/b fluid collection s/p IR aspiration of perihepatic fluid on (7/3), c/b wound dehiscence s/p RTOR exlap, washout, ileocolic resection with end ileostomy, blow hole colostomy, red rubber from ileostomy to small bowel anastomosis; vicryl bridging mesh on (7/5). Went to SICU but extubated 7/6 and SDU 8/2. Has been recovering on telemetry with ECF management and prolonged TPN. Returns to SICU 8/23 with AMS and concern for sepsis now back SDU. Stepped back to SICU 9/1 for oliguria, severe ELVI and hyponatremia (9/1).     Continue NPO/TPN  Monitor ostomy and fistula pouch/drain outputs. Continue imodium, lomotil, octreotide  Nephrology recs appreciated  Rest of care per SICU

## 2023-09-04 NOTE — PROGRESS NOTE ADULT - NS ATTEND AMEND GEN_ALL_CORE FT
77 yo M w/ extensive and prolonged hospital course as above related to complications 2/2 Crohn's now transferred back to SICU for acute symptomatic hyponatremia and nonoliguric renal failure, likely multifactorial 2/2 excessive volume loss from high ostomy output, hypovolemia, TPN fluids. W/ fluid resuscitation and hypertonic saline sodium has corrected nicely, remained on IVF but output significantly improved w/ octreotide and he is now positive 2L, mccauley removed but UOP remains adequate, drop in hgb likely dilutional. He is no longer orthostatic. Trend CBC, transfuse hgb <7. Otherwise, attempt to maintain euvolemia. ELVI likely related to hypovolemia, slowly improving. Continue octreotide for high output per. OOBTC. Wound vac leaking, will try to troubleshoot today.    Direct personal management at bed side and extensive interpretation of the data.  Plan was outlined and discussed in details with the housestaff.  Decision making of highest complexity  Action taken for acute disease activity to reflect the level of care provided:  - medication reconciliation  - review laboratory data

## 2023-09-05 NOTE — PROGRESS NOTE ADULT - ASSESSMENT
79 yo M w/ prolonged hospital course, originally admitted for SBO and CD flare, hospital course c/b multiple GI surgeries, wound dehiscences, ileostomy EC fistula further c/b iATN, now improving     Non-oliguric iATN secondary to hypovolemia/relative hypotension in setting of high GI output  Nephrotic-range proteinuria  - Cr improving, now 2.52 (baseline 0.9-1.0 range)  - Drain/wound output 730cc, UOP 2L; net pos 334cc yesterday  - Urine PCR 4.0 with red cells in setting of mccauley 9/2  - Albumin 2.0  - Renal US 9/4 with kidneys ~11cm, no hydroneph    Recommend:  - Repeat urine studies now that mccauley has been removed, UA, urine prot/creat, urine lytes  - Monitor UOP, ensure no retention, bladder scans as needed  - Keep net neutral fluid balance, match outs with ins  - Ensure BP >120 for adequate renal perfusion  - Trend creatinine daily  - Management of high GI output per primary team

## 2023-09-05 NOTE — PROGRESS NOTE ADULT - SUBJECTIVE AND OBJECTIVE BOX
SUBJECTIVE:   Patient seen and examined at bedside this AM   Over the weekend was upgraded to SICU for worsening ELVI in setting of high enteric output, FeNa post-renal. Started on Lomotil & Octreotide, and Imodium was maxed.   No acute overnight events   Patient well appearing without any complaints this AM   Voiding spontaneously and adequately   Enteric output decreasing       MEDICATIONS  (STANDING):  chlorhexidine 2% Cloths 1 Application(s) Topical <User Schedule>  cholestyramine Powder (Sugar-Free) 4 Gram(s) Oral two times a day  dextrose 5%. 1000 milliLiter(s) (50 mL/Hr) IV Continuous <Continuous>  dextrose 50% Injectable 25 Gram(s) IV Push once  dextrose 50% Injectable 25 Gram(s) IV Push once  diphenoxylate/atropine 2 Tablet(s) Oral every 6 hours  glucagon  Injectable 1 milliGRAM(s) IntraMuscular once  glucagon  Injectable 1 milliGRAM(s) IntraMuscular once  heparin   Injectable 5000 Unit(s) SubCutaneous every 8 hours  insulin lispro (ADMELOG) corrective regimen sliding scale   SubCutaneous every 6 hours  levothyroxine Injectable 40 MICROGram(s) IV Push <User Schedule>  lipid, fat emulsion (Fish Oil and Plant Based) 20% Infusion 0.4999 Gm/kG/Day (20.83 mL/Hr) IV Continuous <Continuous>  loperamide 4 milliGRAM(s) Oral every 6 hours  metoprolol tartrate 25 milliGRAM(s) Oral every 12 hours  nystatin Powder 1 Application(s) Topical three times a day  octreotide  Injectable 100 MICROGram(s) IV Push every 8 hours  pantoprazole  Injectable 40 milliGRAM(s) IV Push daily  Parenteral Nutrition - Adult 1 Each (67 mL/Hr) TPN Continuous <Continuous>  venlafaxine XR. 300 milliGRAM(s) Oral daily    MEDICATIONS  (PRN):  benzocaine/menthol Lozenge 2 Lozenge Oral every 4 hours PRN Sore Throat  dextrose Oral Gel 15 Gram(s) Oral once PRN Blood Glucose LESS THAN 70 milliGRAM(s)/deciliter  melatonin 5 milliGRAM(s) Oral at bedtime PRN Sleep      Vital Signs Last 24 Hrs  T(C): 36.9 (05 Sep 2023 01:06), Max: 37 (04 Sep 2023 16:50)  T(F): 98.4 (05 Sep 2023 01:06), Max: 98.6 (04 Sep 2023 16:50)  HR: 81 (05 Sep 2023 06:00) (80 - 83)  BP: 125/60 (05 Sep 2023 06:00) (102/59 - 142/66)  BP(mean): 87 (05 Sep 2023 06:00) (75 - 99)  RR: 18 (05 Sep 2023 06:00) (17 - 29)  SpO2: 93% (05 Sep 2023 06:00) (92% - 100%)    Parameters below as of 05 Sep 2023 06:00  Patient On (Oxygen Delivery Method): room air        Physical Exam:  General: NAD, resting comfortably in bed  C/V: NSR  Pulm: Nonlabored breathing, no respiratory distress  Abd: soft, wound vac in place with adequate seal and suction (100/350 cc), Poole drain to LIS in fistula (0/0 cc) with surrounding wound manager to gravity (50/50 cc), JOYCE drain in left abdomen (0/0), ostomy pink, patent, and productive of loose brown stool (80/80 cc)  Extrem: WWP, no edema, SCDs in place   Neuro: A&Ox4; no focal deficits       I&O's Summary    04 Sep 2023 07:01  -  05 Sep 2023 07:00  --------------------------------------------------------  IN: 3064.4 mL / OUT: 2300 mL / NET: 764.4 mL        LABS:                        7.9    11.42 )-----------( 227      ( 05 Sep 2023 05:31 )             24.8     09-05    136  |  98  |  34<H>  ----------------------------<  97  3.5   |  31  |  2.52<H>    Ca    8.0<L>      05 Sep 2023 05:31  Phos  3.0     09-05  Mg     1.9     09-05    TPro  6.7  /  Alb  2.0<L>  /  TBili  3.0<H>  /  DBili  2.0<H>  /  AST  44<H>  /  ALT  31  /  AlkPhos  173<H>  09-05      Urinalysis Basic - ( 05 Sep 2023 05:31 )    Color: x / Appearance: x / SG: x / pH: x  Gluc: 97 mg/dL / Ketone: x  / Bili: x / Urobili: x   Blood: x / Protein: x / Nitrite: x   Leuk Esterase: x / RBC: x / WBC x   Sq Epi: x / Non Sq Epi: x / Bacteria: x      CAPILLARY BLOOD GLUCOSE      POCT Blood Glucose.: 115 mg/dL (05 Sep 2023 05:09)  POCT Blood Glucose.: 134 mg/dL (04 Sep 2023 23:06)  POCT Blood Glucose.: 104 mg/dL (04 Sep 2023 17:03)  POCT Blood Glucose.: 155 mg/dL (04 Sep 2023 12:01)    LIVER FUNCTIONS - ( 05 Sep 2023 05:31 )  Alb: 2.0 g/dL / Pro: 6.7 g/dL / ALK PHOS: 173 U/L / ALT: 31 U/L / AST: 44 U/L / GGT: x             RADIOLOGY & ADDITIONAL STUDIES:

## 2023-09-05 NOTE — PROGRESS NOTE ADULT - ASSESSMENT
78 M w/ Crohn's, AFib/Flutter s/p DCCVs on amiodarone, remote ileocectomy and open appendectomy. Admitted (6/23) for SBO vs Crohns flare, s/p NGT decompression and s/p lap TRE converted to open TRE, SBR x 3, left in discontinuity with abthera vac on (6/27), RTOR for ileocolic resection, small bowel anastomosis, and abdominal wall closure on (6/28), c/b fluid collection s/p IR aspiration of perihepatic fluid on (7/3), c/b wound dehiscence s/p RTOR exlap, washout, ileocolic resection with end ileostomy, blow hole colostomy, red rubber from ileostomy to small bowel anastomosis; vicryl bridging mesh on (7/5). hospital course include interval development of EC fistulas and prolonged TPN for nutritional support, brief episode of AMS 8/23 (resolved within 1day), back to SICU 9/1 for hyponatremia (Na 122), oliguric ELVI, concern for hypovolemia,     NEURO: recent altered mental status possibly due to scopolamine patch. Hx of depression - Continue outpatient effexor. Pain control with Dilaudid for vac changes.   CV: Hx Afib/flutter: s/p DCCV, Off amiodarone given transaminitis and elevated T bili, Hx of AFib - Continue Metoprolol 25 BID. Hx HTN - Holding Norvasc, Losartan. Hx HLD: hold Lipitor given LFTs. TTE  (7/18) - PASP 64mmHg, EF 65-70%,   PULM: no resp distress on room air.   GI/FEN: Previously on CLD with subsequent high fistula/wound vac output, currently NPO, started on Immodium lomotil and Restarted Octreotide 100mcg q8. fistula/vac output had slowed down a bit. cont TPN/PICC. hyponatremia had improved.  Transaminitis of unclear etiology: distended gallbladder on CT 8/25, RUQ US negative. Monitor output from EC fistula and Wound vac daily.   : ELVI, now having adequate UO. Nephrology recommendations appreciated. Hyponatremia improved.   ENDO: mISS. Hx hypothyroid: IV Synthroid, TSH 9 (8/23), recheck in 2-3 weeks.   ID: Currently off abx.   PREVIOUSLY: C. tertium, Lactobacillis from his IR cx 7/3, and candida albicans, lactobacillus, vanc sensitive E faecium, vanc resistant E gallinarum, vanc resistant E casseliflavus, lactobacillus from his OR cx 7/5. completed imipenem (6/30-7/12, 7/23-7/30), Dapto (6/30-7/5 and 7/23-7/24, 8/23-8/24), cefepime (8/23-24).   PPX: SCDs, SQH.   LINES: PIVs, LUE PICC (9/1 - )  WOUNDS/DRAINS: ECF abdominal wound with vac change Mon/Fri--next vac change on 9/5. PT: Home health PT  DISPO: SICU   78 M w/ Crohn's, AFib/Flutter s/p DCCVs on amiodarone, remote ileocectomy and open appendectomy. Admitted (6/23) for SBO vs Crohns flare, s/p NGT decompression and s/p lap TRE converted to open TRE, SBR x 3, left in discontinuity with abthera vac on (6/27), RTOR for ileocolic resection, small bowel anastomosis, and abdominal wall closure on (6/28), c/b fluid collection s/p IR aspiration of perihepatic fluid on (7/3), c/b wound dehiscence s/p RTOR exlap, washout, ileocolic resection with end ileostomy, blow hole colostomy, red rubber from ileostomy to small bowel anastomosis; vicryl bridging mesh on (7/5). hospital course include interval development of EC fistulas and prolonged TPN for nutritional support, brief episode of AMS 8/23 (resolved within 1day), back to SICU 9/1 for hyponatremia (Na 122), oliguric ELVI, concern for hypovolemia,     NEURO: recent altered mental status possibly due to scopolamine patch. Hx of depression - Continue outpatient effexor. Pain control with Dilaudid for vac changes.   CV: Hx Afib/flutter: s/p DCCV, Off amiodarone given transaminitis and elevated T bili, Hx of AFib - Continue Metoprolol 25 BID. Hx HTN - Holding Norvasc, Losartan. Hx HLD: hold Lipitor given LFTs. TTE  (7/18) - PASP 64mmHg, EF 65-70%,   PULM: no resp distress on room air.   GI/FEN: Previously on CLD with subsequent high fistula/wound vac output, currently NPO, started on Immodium lomotil and Restarted Octreotide 100mcg q8. fistula/vac output had slowed down a bit. cont TPN/PICC. hyponatremia had improved.  Transaminitis of unclear etiology: distended gallbladder on CT 8/25, RUQ US negative. Monitor output from EC fistula and Wound vac daily.   : ATN, now having adequate UO. Nephrology recommendations appreciated. Hyponatremia and creatinine improved.   ENDO: mISS. Hx hypothyroid: IV Synthroid, TSH 9 (8/23), recheck in 2-3 weeks.   ID: Currently off abx.   PREVIOUSLY: C. tertium, Lactobacillis from his IR cx 7/3, and candida albicans, lactobacillus, vanc sensitive E faecium, vanc resistant E gallinarum, vanc resistant E casseliflavus, lactobacillus from his OR cx 7/5. completed imipenem (6/30-7/12, 7/23-7/30), Dapto (6/30-7/5 and 7/23-7/24, 8/23-8/24), cefepime (8/23-24).   PPX: SCDs, SQH.   LINES: PIVs, LUE PICC (9/1 - )  WOUNDS/DRAINS: ECF abdominal wound with vac change Mon/Fri--next vac change on 9/5. PT: Home health PT  DISPO: SICU

## 2023-09-05 NOTE — PROGRESS NOTE ADULT - ASSESSMENT
78M w/ Crohn's, AFib/Flutter s/p DCCVs on amiodarone, remote ileocectomy and open appy here for SBO vs Crohns flare, s/p NGT decompression and now s/p lap TRE converted to open TRE, SBR x 3, left in discontinuity with abthera vac on 6/27, RTOR for ileocolic resection, small bowel anastomosis, and abdominal wall closure on 6/28, and s/p IR aspiration of perihepatic fluid on 7/3, stepped down to telemetry on 7/4. wound dehiscence on 7/5, RTOR ex-lap, washout, ileocolic resection with end ileostomy, blow hole colostomy, red rubber from ileostomy to small bowel anastomosis; Vicryl bridging mesh on 7/5. Transferred to SICU post op for HD monitoring. Extubated 7/6. Surgical wound with decreasing in size and granulating with Vac. Tentative plan for skin graft per Plastics; timing TBD pending proper wound shrinkage and granulation   8/23: Upgraded to SICU 8/23 for acute delirium and tremors, r/o sepsis vs metabolic encephalopathy. Ammonia levels normal    8/25: Spiked temp to 101.3 overnight w/ new leukocytosis to 14   8/28: Downgraded from SICU. No growth from blood cultures. Abx x 5 days until 8/29 8/30: Leukocytosis resolved   9/1: Decreased UOP, soft pressures. Cr 3.08 from 1.12. C/f dehydration 2/2 high fistula output vs ELVI   9/2: Upgraded to SICU for worsening ELVI   ELVI improving (Cr 2.52 from 3.13, BUN 34 from 38). Enteric output decreasing, Direct hyperbilirubinemia stable (last 24 hrs) but improving overall. Transaminitis improving. RUQ US 8/26 showed sludge and GB distention w/o evidence of inflammation. RP ultrasound 9/4 unremarkable     Plan:   - NPO/IVF   - Wound vac change today  - Continue Loperamide, Lomotil and Octreotide; monitor fistula drain, ileostomy, wound vac and fistula wound manager output   - Strict I/O; monitor Cr trend   - PRN pain and nausea control   - Hx of A-fib/flutter; off Amio, on Lopressor PO BID. Appreciate Cards/EP input   - Encourage IS/OOB   - DVT ppx   - PT   - Tentative plan for skin graft per Plastics; timing TBD pending proper wound granulation     D/w SICU, chief resident and attending  78M w/ Crohn's, AFib/Flutter s/p DCCVs on amiodarone, remote ileocectomy and open appy here for SBO vs Crohns flare, s/p NGT decompression and now s/p lap TER converted to open TRE, SBR x 3, left in discontinuity with abthera vac on 6/27, RTOR for ileocolic resection, small bowel anastomosis, and abdominal wall closure on 6/28, and s/p IR aspiration of perihepatic fluid on 7/3, stepped down to telemetry on 7/4. wound dehiscence on 7/5, RTOR ex-lap, washout, ileocolic resection with end ileostomy, blow hole colostomy, red rubber from ileostomy to small bowel anastomosis; Vicryl bridging mesh on 7/5. Transferred to SICU post op for HD monitoring. Extubated 7/6. Surgical wound with decreasing in size and granulating with Vac. Tentative plan for skin graft per Plastics; timing TBD pending proper wound shrinkage and granulation   8/23: Upgraded to SICU 8/23 for acute delirium and tremors, r/o sepsis vs metabolic encephalopathy. Ammonia levels normal    8/25: Spiked temp to 101.3 overnight w/ new leukocytosis to 14   8/28: Downgraded from SICU. No growth from blood cultures. Abx x 5 days until 8/29 8/30: Leukocytosis resolved   9/1: Decreased UOP, soft pressures. Cr 3.08 from 1.12. C/f dehydration 2/2 high fistula output vs ELVI   9/2: Upgraded to SICU for worsening ELVI   ELVI improving (Cr 2.52 from 3.13, BUN 34 from 38). Enteric output decreasing, Direct hyperbilirubinemia stable (last 24 hrs) but improving overall. Transaminitis improving. RUQ US 8/26 showed sludge and GB distention w/o evidence of inflammation. RP ultrasound 9/4 unremarkable     Plan:   - NPO/IVF/TPN  - Wound vac change today  - Continue Loperamide, Lomotil and Octreotide; monitor fistula drain, ileostomy, wound vac and fistula wound manager output   - Strict I/O; monitor Cr trend   - PRN pain and nausea control   - Hx of A-fib/flutter; off Amio, on Lopressor PO BID. Appreciate Cards/EP input   - Encourage IS/OOB   - DVT ppx   - PT   - Tentative plan for skin graft per Plastics; timing TBD pending proper wound granulation     D/w SICU, chief resident and attending

## 2023-09-05 NOTE — PROGRESS NOTE ADULT - SUBJECTIVE AND OBJECTIVE BOX
Nephrology progress note        Allergies:  penicillin (Angioedema)    [facility] Medications:   MEDICATIONS  (STANDING):  chlorhexidine 2% Cloths 1 Application(s) Topical <User Schedule>  cholestyramine Powder (Sugar-Free) 4 Gram(s) Oral two times a day  dextrose 5%. 1000 milliLiter(s) (50 mL/Hr) IV Continuous <Continuous>  dextrose 50% Injectable 25 Gram(s) IV Push once  dextrose 50% Injectable 25 Gram(s) IV Push once  diphenoxylate/atropine 2 Tablet(s) Oral every 6 hours  glucagon  Injectable 1 milliGRAM(s) IntraMuscular once  glucagon  Injectable 1 milliGRAM(s) IntraMuscular once  heparin   Injectable 5000 Unit(s) SubCutaneous every 8 hours  insulin lispro (ADMELOG) corrective regimen sliding scale   SubCutaneous every 6 hours  levothyroxine Injectable 40 MICROGram(s) IV Push <User Schedule>  lipid, fat emulsion (Fish Oil and Plant Based) 20% Infusion 0.4999 Gm/kG/Day (20.83 mL/Hr) IV Continuous <Continuous>  loperamide 4 milliGRAM(s) Oral every 6 hours  metoprolol tartrate 25 milliGRAM(s) Oral every 12 hours  nystatin Powder 1 Application(s) Topical three times a day  octreotide  Injectable 100 MICROGram(s) IV Push every 8 hours  pantoprazole  Injectable 40 milliGRAM(s) IV Push daily  Parenteral Nutrition - Adult 1 Each (67 mL/Hr) TPN Continuous <Continuous>  Parenteral Nutrition - Adult 1 Each (88 mL/Hr) TPN Continuous <Continuous>  venlafaxine XR. 300 milliGRAM(s) Oral daily    REVIEW OF SYSTEMS:  CONSTITUTIONAL: No weakness, fevers or chills  EYES/ENT: No visual changes;  No vertigo or throat pain   NECK: No pain or stiffness  RESPIRATORY: No cough, wheezing, hemoptysis; No shortness of breath  CARDIOVASCULAR: No chest pain or palpitations.  GASTROINTESTINAL: No abdominal or epigastric pain. No nausea, vomiting, or hematemesis; No diarrhea or constipation. No melena or hematochezia.  GENITOURINARY: No dysuria, frequency, foamy urine, urinary urgency, incontinence or hematuria  NEUROLOGICAL: No numbness or weakness  SKIN: No itching, burning, rashes, or lesions   VASCULAR: No bilateral lower extremity edema.   All other review of systems is negative unless indicated above.    VITALS:  T(F): 98.2 (09-05-23 @ 05:23), Max: 98.6 (09-04-23 @ 16:50)  HR: 80 (09-05-23 @ 14:00)  BP: 102/54 (09-05-23 @ 14:00)  RR: 18 (09-05-23 @ 14:00)  SpO2: 94% (09-05-23 @ 14:00)  Wt(kg): --    09-03 @ 07:01  -  09-04 @ 07:00  --------------------------------------------------------  IN: 4908.4 mL / OUT: 2375 mL / NET: 2533.4 mL    09-04 @ 07:01  -  09-05 @ 07:00  --------------------------------------------------------  IN: 3064.4 mL / OUT: 2730 mL / NET: 334.4 mL    09-05 @ 07:01  -  09-05 @ 14:20  --------------------------------------------------------  IN: 519 mL / OUT: 750 mL / NET: -231 mL        PHYSICAL EXAM:      LABS:  09-05    136  |  98  |  34<H>  ----------------------------<  97  3.5   |  31  |  2.52<H>    Ca    8.0<L>      05 Sep 2023 05:31  Phos  3.0     09-05  Mg     1.9     09-05    TPro  6.7  /  Alb  2.0<L>  /  TBili  3.0<H>  /  DBili  2.0<H>  /  AST  44<H>  /  ALT  31  /  AlkPhos  173<H>  09-05                          7.9    11.42 )-----------( 227      ( 05 Sep 2023 05:31 )             24.8       Urine Studies:  Creatinine Trend: 2.52<--, 3.13<--, 3.56<--, 3.43<--, 3.71<--, 3.74<--  Urinalysis Basic - ( 05 Sep 2023 05:31 )    Color:  / Appearance:  / SG:  / pH:   Gluc: 97 mg/dL / Ketone:   / Bili:  / Urobili:    Blood:  / Protein:  / Nitrite:    Leuk Esterase:  / RBC:  / WBC    Sq Epi:  / Non Sq Epi:  / Bacteria:       Sodium, Random Urine: 73 mmol/L (09-02 @ 08:46)  Creatinine, Random Urine: 21 mg/dL (09-02 @ 08:46)  Protein/Creatinine Ratio Calculation: 4.0 Ratio (09-02 @ 08:46)  Osmolality, Random Urine: 200 mosm/kg (09-02 @ 08:46)  Potassium, Random Urine: 5 mmol/L (09-02 @ 08:46)  Sodium, Random Urine: 81 mmol/L (09-02 @ 07:02)  Creatinine, Random Urine: 19 mg/dL (09-02 @ 07:02)  Protein/Creatinine Ratio Calculation: 2.0 Ratio (09-02 @ 07:02)  Osmolality, Random Urine: 193 mosm/kg (09-02 @ 07:02)  Potassium, Random Urine: 3 mmol/L (09-02 @ 07:02)  Sodium, Random Urine: 58 mmol/L (09-01 @ 20:03)  Creatinine, Random Urine: 40 mg/dL (09-01 @ 20:03)  Protein/Creatinine Ratio Calculation: 1.4 Ratio (09-01 @ 20:03)  Osmolality, Random Urine: 178 mosm/kg (09-01 @ 20:03)  Potassium, Random Urine: 9 mmol/L (09-01 @ 20:03)  Sodium, Random Urine: 36 mmol/L (09-01 @ 18:23)  Creatinine, Random Urine: 96 mg/dL (09-01 @ 18:23)  Osmolality, Random Urine: 193 mosm/kg (09-01 @ 18:23)    RADIOLOGY & ADDITIONAL STUDIES:  Reviewed Nephrology progress note    Seen at bedside. Feeling better, no acute complaints. Not able to have PO intake.    Allergies:  penicillin (Angioedema)    Hospital Medications:   MEDICATIONS  (STANDING):  chlorhexidine 2% Cloths 1 Application(s) Topical <User Schedule>  cholestyramine Powder (Sugar-Free) 4 Gram(s) Oral two times a day  dextrose 5%. 1000 milliLiter(s) (50 mL/Hr) IV Continuous <Continuous>  dextrose 50% Injectable 25 Gram(s) IV Push once  dextrose 50% Injectable 25 Gram(s) IV Push once  diphenoxylate/atropine 2 Tablet(s) Oral every 6 hours  glucagon  Injectable 1 milliGRAM(s) IntraMuscular once  glucagon  Injectable 1 milliGRAM(s) IntraMuscular once  heparin   Injectable 5000 Unit(s) SubCutaneous every 8 hours  insulin lispro (ADMELOG) corrective regimen sliding scale   SubCutaneous every 6 hours  levothyroxine Injectable 40 MICROGram(s) IV Push <User Schedule>  lipid, fat emulsion (Fish Oil and Plant Based) 20% Infusion 0.4999 Gm/kG/Day (20.83 mL/Hr) IV Continuous <Continuous>  loperamide 4 milliGRAM(s) Oral every 6 hours  metoprolol tartrate 25 milliGRAM(s) Oral every 12 hours  nystatin Powder 1 Application(s) Topical three times a day  octreotide  Injectable 100 MICROGram(s) IV Push every 8 hours  pantoprazole  Injectable 40 milliGRAM(s) IV Push daily  Parenteral Nutrition - Adult 1 Each (67 mL/Hr) TPN Continuous <Continuous>  Parenteral Nutrition - Adult 1 Each (88 mL/Hr) TPN Continuous <Continuous>  venlafaxine XR. 300 milliGRAM(s) Oral daily    REVIEW OF SYSTEMS:  Review of systems is negative unless indicated above.    VITALS:  T(F): 98.2 (09-05-23 @ 05:23), Max: 98.6 (09-04-23 @ 16:50)  HR: 80 (09-05-23 @ 14:00)  BP: 102/54 (09-05-23 @ 14:00)  RR: 18 (09-05-23 @ 14:00)  SpO2: 94% (09-05-23 @ 14:00)  Wt(kg): --    09-03 @ 07:01  -  09-04 @ 07:00  --------------------------------------------------------  IN: 4908.4 mL / OUT: 2375 mL / NET: 2533.4 mL    09-04 @ 07:01 - 09-05 @ 07:00  --------------------------------------------------------  IN: 3064.4 mL / OUT: 2730 mL / NET: 334.4 mL    09-05 @ 07:01 - 09-05 @ 14:20  --------------------------------------------------------  IN: 519 mL / OUT: 750 mL / NET: -231 mL        PHYSICAL EXAM:  GENERAL: NAD, sitting in chair  CHEST/LUNG: Normal respiratory effort  HEART: Regular rate  ABDOMEN: Non-distended, ileostomy bags  : No mccauley  EXTREMITIES: no edema  Neurology: Awake, alert, no focal neurological deficits noted    LABS:  09-05    136  |  98  |  34<H>  ----------------------------<  97  3.5   |  31  |  2.52<H>    Ca    8.0<L>      05 Sep 2023 05:31  Phos  3.0     09-05  Mg     1.9     09-05    TPro  6.7  /  Alb  2.0<L>  /  TBili  3.0<H>  /  DBili  2.0<H>  /  AST  44<H>  /  ALT  31  /  AlkPhos  173<H>  09-05                          7.9    11.42 )-----------( 227      ( 05 Sep 2023 05:31 )             24.8       Urine Studies:  Creatinine Trend: 2.52<--, 3.13<--, 3.56<--, 3.43<--, 3.71<--, 3.74<--  Urinalysis Basic - ( 05 Sep 2023 05:31 )    Color:  / Appearance:  / SG:  / pH:   Gluc: 97 mg/dL / Ketone:   / Bili:  / Urobili:    Blood:  / Protein:  / Nitrite:    Leuk Esterase:  / RBC:  / WBC    Sq Epi:  / Non Sq Epi:  / Bacteria:       Sodium, Random Urine: 73 mmol/L (09-02 @ 08:46)  Creatinine, Random Urine: 21 mg/dL (09-02 @ 08:46)  Protein/Creatinine Ratio Calculation: 4.0 Ratio (09-02 @ 08:46)  Osmolality, Random Urine: 200 mosm/kg (09-02 @ 08:46)  Potassium, Random Urine: 5 mmol/L (09-02 @ 08:46)  Sodium, Random Urine: 81 mmol/L (09-02 @ 07:02)  Creatinine, Random Urine: 19 mg/dL (09-02 @ 07:02)  Protein/Creatinine Ratio Calculation: 2.0 Ratio (09-02 @ 07:02)  Osmolality, Random Urine: 193 mosm/kg (09-02 @ 07:02)  Potassium, Random Urine: 3 mmol/L (09-02 @ 07:02)  Sodium, Random Urine: 58 mmol/L (09-01 @ 20:03)  Creatinine, Random Urine: 40 mg/dL (09-01 @ 20:03)  Protein/Creatinine Ratio Calculation: 1.4 Ratio (09-01 @ 20:03)  Osmolality, Random Urine: 178 mosm/kg (09-01 @ 20:03)  Potassium, Random Urine: 9 mmol/L (09-01 @ 20:03)  Sodium, Random Urine: 36 mmol/L (09-01 @ 18:23)  Creatinine, Random Urine: 96 mg/dL (09-01 @ 18:23)  Osmolality, Random Urine: 193 mosm/kg (09-01 @ 18:23)    RADIOLOGY & ADDITIONAL STUDIES:  Reviewed

## 2023-09-05 NOTE — PROGRESS NOTE ADULT - ATTENDING COMMENTS
[employer] Attending Coverage for Dr Peterson  VAC holding suction/no leak from wound manager pouch  Total enteric outputs <1L on octreotide, imodium and lomotil  Continues to have good urine output with downtrending Cr  NPO/on TPN  Afebrile  Gen NAD, alert and oriented  Abdomen soft, VAC and midline wound manager pouch in place without leak, (+) ostomy, (+) JOYCE, nontender  Continue NPO, TPN  Monitor ostomy and fistula pouch/drain outputs. Continue imodium, lomotil, octreotide  Nephrology care appreciated  ICU care appreciated  Continue local wound care to midline  Discussed with chief resident  Dr Peterson to resume care 9/6/23

## 2023-09-05 NOTE — PROGRESS NOTE ADULT - ATTENDING COMMENTS
Crohn's, AF, HTN s/p SBR, ileocolic resection, blowhole colostomy, end ileostomy now with ATN  physical as above  CV stable on metoprolol  now euvolemic  creatinine decreasing  TPN written  readvance PT  VAC change today  rest as above

## 2023-09-05 NOTE — PROGRESS NOTE ADULT - ATTENDING COMMENTS
ELVI improved  cont IVF as needed to keep even to positive I/O  repeat urine studies as above  follow uo, chem

## 2023-09-05 NOTE — PROGRESS NOTE ADULT - SUBJECTIVE AND OBJECTIVE BOX
Overnight events: No acute overnight events.     SUBJECTIVE: Patient resting conformably in bed. Patient denies any current pain, nausea, shortness of breath or fevers.      MEDICATIONS  (STANDING):  chlorhexidine 2% Cloths 1 Application(s) Topical <User Schedule>  cholestyramine Powder (Sugar-Free) 4 Gram(s) Oral two times a day  dextrose 5%. 1000 milliLiter(s) (50 mL/Hr) IV Continuous <Continuous>  dextrose 50% Injectable 25 Gram(s) IV Push once  dextrose 50% Injectable 25 Gram(s) IV Push once  diphenoxylate/atropine 2 Tablet(s) Oral every 6 hours  glucagon  Injectable 1 milliGRAM(s) IntraMuscular once  glucagon  Injectable 1 milliGRAM(s) IntraMuscular once  heparin   Injectable 5000 Unit(s) SubCutaneous every 8 hours  insulin lispro (ADMELOG) corrective regimen sliding scale   SubCutaneous every 6 hours  levothyroxine Injectable 40 MICROGram(s) IV Push <User Schedule>  lipid, fat emulsion (Fish Oil and Plant Based) 20% Infusion 0.4999 Gm/kG/Day (20.83 mL/Hr) IV Continuous <Continuous>  loperamide 4 milliGRAM(s) Oral every 6 hours  magnesium sulfate  IVPB 1 Gram(s) IV Intermittent once  metoprolol tartrate 25 milliGRAM(s) Oral every 12 hours  nystatin Powder 1 Application(s) Topical three times a day  octreotide  Injectable 100 MICROGram(s) IV Push every 8 hours  pantoprazole  Injectable 40 milliGRAM(s) IV Push daily  Parenteral Nutrition - Adult 1 Each (67 mL/Hr) TPN Continuous <Continuous>  potassium chloride   Powder 40 milliEquivalent(s) Oral once  venlafaxine XR. 300 milliGRAM(s) Oral daily    MEDICATIONS  (PRN):  benzocaine/menthol Lozenge 2 Lozenge Oral every 4 hours PRN Sore Throat  dextrose Oral Gel 15 Gram(s) Oral once PRN Blood Glucose LESS THAN 70 milliGRAM(s)/deciliter  melatonin 5 milliGRAM(s) Oral at bedtime PRN Sleep      Vital Signs Last 24 Hrs  T(C): 36.8 (05 Sep 2023 05:23), Max: 37 (04 Sep 2023 16:50)  T(F): 98.2 (05 Sep 2023 05:23), Max: 98.6 (04 Sep 2023 16:50)  HR: 82 (05 Sep 2023 07:00) (80 - 83)  BP: 142/65 (05 Sep 2023 07:00) (102/59 - 142/66)  BP(mean): 93 (05 Sep 2023 07:00) (75 - 99)  RR: 23 (05 Sep 2023 07:00) (17 - 29)  SpO2: 95% (05 Sep 2023 07:00) (92% - 100%)    Parameters below as of 05 Sep 2023 07:00  Patient On (Oxygen Delivery Method): room air        Physical Exam:  General: NAD, resting comfortably in bed  Pulmonary: Nonlabored breathing, no respiratory distress, clear lungs bilaterally   Cardiovascular: NSR, normal rate and rhythm   Abdominal: soft, NT, vac in place with suction, ostomy bag over blow hole in place with minimal fluid, OJYCE drain in place and secure   Extremities: WWP, normal strength, 1+ pedal edema   Neuro: A/O x 3, CNs II-XII grossly intact, no focal deficits, normal motor/sensation  Pulses: palpable distal pulses    I&O's Summary    04 Sep 2023 07:01  -  05 Sep 2023 07:00  --------------------------------------------------------  IN: 3064.4 mL / OUT: 2730 mL / NET: 334.4 mL    05 Sep 2023 07:01  -  05 Sep 2023 07:58  --------------------------------------------------------  IN: 67 mL / OUT: 0 mL / NET: 67 mL        LABS:                        7.9    11.42 )-----------( 227      ( 05 Sep 2023 05:31 )             24.8     09-05    136  |  98  |  34<H>  ----------------------------<  97  3.5   |  31  |  2.52<H>    Ca    8.0<L>      05 Sep 2023 05:31  Phos  3.0     09-05  Mg     1.9     09-05    TPro  6.7  /  Alb  2.0<L>  /  TBili  3.0<H>  /  DBili  2.0<H>  /  AST  44<H>  /  ALT  31  /  AlkPhos  173<H>  09-05      Urinalysis Basic - ( 05 Sep 2023 05:31 )    Color: x / Appearance: x / SG: x / pH: x  Gluc: 97 mg/dL / Ketone: x  / Bili: x / Urobili: x   Blood: x / Protein: x / Nitrite: x   Leuk Esterase: x / RBC: x / WBC x   Sq Epi: x / Non Sq Epi: x / Bacteria: x      CAPILLARY BLOOD GLUCOSE      POCT Blood Glucose.: 115 mg/dL (05 Sep 2023 05:09)  POCT Blood Glucose.: 134 mg/dL (04 Sep 2023 23:06)  POCT Blood Glucose.: 104 mg/dL (04 Sep 2023 17:03)  POCT Blood Glucose.: 155 mg/dL (04 Sep 2023 12:01)    LIVER FUNCTIONS - ( 05 Sep 2023 05:31 )  Alb: 2.0 g/dL / Pro: 6.7 g/dL / ALK PHOS: 173 U/L / ALT: 31 U/L / AST: 44 U/L / GGT: x             RADIOLOGY & ADDITIONAL STUDIES:   Overnight events: No acute overnight events.     SUBJECTIVE: Patient resting conformably in bed. Patient denies any current pain, nausea, shortness of breath or fevers.      MEDICATIONS  (STANDING):  chlorhexidine 2% Cloths 1 Application(s) Topical <User Schedule>  cholestyramine Powder (Sugar-Free) 4 Gram(s) Oral two times a day  dextrose 5%. 1000 milliLiter(s) (50 mL/Hr) IV Continuous <Continuous>  dextrose 50% Injectable 25 Gram(s) IV Push once  dextrose 50% Injectable 25 Gram(s) IV Push once  diphenoxylate/atropine 2 Tablet(s) Oral every 6 hours  glucagon  Injectable 1 milliGRAM(s) IntraMuscular once  glucagon  Injectable 1 milliGRAM(s) IntraMuscular once  heparin   Injectable 5000 Unit(s) SubCutaneous every 8 hours  insulin lispro (ADMELOG) corrective regimen sliding scale   SubCutaneous every 6 hours  levothyroxine Injectable 40 MICROGram(s) IV Push <User Schedule>  lipid, fat emulsion (Fish Oil and Plant Based) 20% Infusion 0.4999 Gm/kG/Day (20.83 mL/Hr) IV Continuous <Continuous>  loperamide 4 milliGRAM(s) Oral every 6 hours  magnesium sulfate  IVPB 1 Gram(s) IV Intermittent once  metoprolol tartrate 25 milliGRAM(s) Oral every 12 hours  nystatin Powder 1 Application(s) Topical three times a day  octreotide  Injectable 100 MICROGram(s) IV Push every 8 hours  pantoprazole  Injectable 40 milliGRAM(s) IV Push daily  Parenteral Nutrition - Adult 1 Each (67 mL/Hr) TPN Continuous <Continuous>  potassium chloride   Powder 40 milliEquivalent(s) Oral once  venlafaxine XR. 300 milliGRAM(s) Oral daily    MEDICATIONS  (PRN):  benzocaine/menthol Lozenge 2 Lozenge Oral every 4 hours PRN Sore Throat  dextrose Oral Gel 15 Gram(s) Oral once PRN Blood Glucose LESS THAN 70 milliGRAM(s)/deciliter  melatonin 5 milliGRAM(s) Oral at bedtime PRN Sleep      Vital Signs Last 24 Hrs  T(C): 36.8 (05 Sep 2023 05:23), Max: 37 (04 Sep 2023 16:50)  T(F): 98.2 (05 Sep 2023 05:23), Max: 98.6 (04 Sep 2023 16:50)  HR: 82 (05 Sep 2023 07:00) (80 - 83)  BP: 142/65 (05 Sep 2023 07:00) (102/59 - 142/66)  BP(mean): 93 (05 Sep 2023 07:00) (75 - 99)  RR: 23 (05 Sep 2023 07:00) (17 - 29)  SpO2: 95% (05 Sep 2023 07:00) (92% - 100%)    Parameters below as of 05 Sep 2023 07:00  Patient On (Oxygen Delivery Method): room air        Physical Exam:  General: NAD, resting comfortably in bed  Pulmonary: Nonlabored breathing, no respiratory distress, clear lungs bilaterally   Cardiovascular: NSR, normal rate and rhythm   Abdominal: soft, NT, vac in place with suction, ostomy bag over blow hole in place with minimal fluid, JOYCE drain in place and secure   Extremities: WWP, normal strength,  Neuro: A/O x 3, CNs II-XII grossly intact, no focal deficits, normal motor/sensation  Pulses: palpable distal pulses    I&O's Summary    04 Sep 2023 07:01  -  05 Sep 2023 07:00  --------------------------------------------------------  IN: 3064.4 mL / OUT: 2730 mL / NET: 334.4 mL    05 Sep 2023 07:01  -  05 Sep 2023 07:58  --------------------------------------------------------  IN: 67 mL / OUT: 0 mL / NET: 67 mL        LABS:                        7.9    11.42 )-----------( 227      ( 05 Sep 2023 05:31 )             24.8     09-05    136  |  98  |  34<H>  ----------------------------<  97  3.5   |  31  |  2.52<H>    Ca    8.0<L>      05 Sep 2023 05:31  Phos  3.0     09-05  Mg     1.9     09-05    TPro  6.7  /  Alb  2.0<L>  /  TBili  3.0<H>  /  DBili  2.0<H>  /  AST  44<H>  /  ALT  31  /  AlkPhos  173<H>  09-05      Urinalysis Basic - ( 05 Sep 2023 05:31 )    Color: x / Appearance: x / SG: x / pH: x  Gluc: 97 mg/dL / Ketone: x  / Bili: x / Urobili: x   Blood: x / Protein: x / Nitrite: x   Leuk Esterase: x / RBC: x / WBC x   Sq Epi: x / Non Sq Epi: x / Bacteria: x      CAPILLARY BLOOD GLUCOSE      POCT Blood Glucose.: 115 mg/dL (05 Sep 2023 05:09)  POCT Blood Glucose.: 134 mg/dL (04 Sep 2023 23:06)  POCT Blood Glucose.: 104 mg/dL (04 Sep 2023 17:03)  POCT Blood Glucose.: 155 mg/dL (04 Sep 2023 12:01)    LIVER FUNCTIONS - ( 05 Sep 2023 05:31 )  Alb: 2.0 g/dL / Pro: 6.7 g/dL / ALK PHOS: 173 U/L / ALT: 31 U/L / AST: 44 U/L / GGT: x             RADIOLOGY & ADDITIONAL STUDIES:   Overnight events: No acute overnight events.     SUBJECTIVE: Patient resting conformably in bed. Patient denies any current pain, nausea, shortness of breath or fevers.      MEDICATIONS  (STANDING):  chlorhexidine 2% Cloths 1 Application(s) Topical <User Schedule>  cholestyramine Powder (Sugar-Free) 4 Gram(s) Oral two times a day  dextrose 5%. 1000 milliLiter(s) (50 mL/Hr) IV Continuous <Continuous>  dextrose 50% Injectable 25 Gram(s) IV Push once  dextrose 50% Injectable 25 Gram(s) IV Push once  diphenoxylate/atropine 2 Tablet(s) Oral every 6 hours  glucagon  Injectable 1 milliGRAM(s) IntraMuscular once  glucagon  Injectable 1 milliGRAM(s) IntraMuscular once  heparin   Injectable 5000 Unit(s) SubCutaneous every 8 hours  insulin lispro (ADMELOG) corrective regimen sliding scale   SubCutaneous every 6 hours  levothyroxine Injectable 40 MICROGram(s) IV Push <User Schedule>  lipid, fat emulsion (Fish Oil and Plant Based) 20% Infusion 0.4999 Gm/kG/Day (20.83 mL/Hr) IV Continuous <Continuous>  loperamide 4 milliGRAM(s) Oral every 6 hours  magnesium sulfate  IVPB 1 Gram(s) IV Intermittent once  metoprolol tartrate 25 milliGRAM(s) Oral every 12 hours  nystatin Powder 1 Application(s) Topical three times a day  octreotide  Injectable 100 MICROGram(s) IV Push every 8 hours  pantoprazole  Injectable 40 milliGRAM(s) IV Push daily  Parenteral Nutrition - Adult 1 Each (67 mL/Hr) TPN Continuous <Continuous>  potassium chloride   Powder 40 milliEquivalent(s) Oral once  venlafaxine XR. 300 milliGRAM(s) Oral daily    MEDICATIONS  (PRN):  benzocaine/menthol Lozenge 2 Lozenge Oral every 4 hours PRN Sore Throat  dextrose Oral Gel 15 Gram(s) Oral once PRN Blood Glucose LESS THAN 70 milliGRAM(s)/deciliter  melatonin 5 milliGRAM(s) Oral at bedtime PRN Sleep      Vital Signs Last 24 Hrs  T(C): 36.8 (05 Sep 2023 05:23), Max: 37 (04 Sep 2023 16:50)  T(F): 98.2 (05 Sep 2023 05:23), Max: 98.6 (04 Sep 2023 16:50)  HR: 82 (05 Sep 2023 07:00) (80 - 83)  BP: 142/65 (05 Sep 2023 07:00) (102/59 - 142/66)  BP(mean): 93 (05 Sep 2023 07:00) (75 - 99)  RR: 23 (05 Sep 2023 07:00) (17 - 29)  SpO2: 95% (05 Sep 2023 07:00) (92% - 100%)    Parameters below as of 05 Sep 2023 07:00  Patient On (Oxygen Delivery Method): room air        Physical Exam:  General: NAD, resting comfortably in bed  Pulmonary: Nonlabored breathing, no respiratory distress, clear lungs bilaterally   Cardiovascular: NSR, normal rate and rhythm   Abdominal: soft, NT, vac in place with suction, ostomy bag over blow hole in place with minimal fluid, JOYCE drain in place and secure   Extremities: WWP,  no edema  Neuro: A/O x 3, CNs II-XII grossly intact, no focal deficits, normal motor/sensation,normal strength,  Pulses: palpable distal pulses  Skin: no rash    I&O's Summary    04 Sep 2023 07:01  -  05 Sep 2023 07:00  --------------------------------------------------------  IN: 3064.4 mL / OUT: 2730 mL / NET: 334.4 mL    05 Sep 2023 07:01  -  05 Sep 2023 07:58  --------------------------------------------------------  IN: 67 mL / OUT: 0 mL / NET: 67 mL        LABS:                        7.9    11.42 )-----------( 227      ( 05 Sep 2023 05:31 )             24.8     09-05    136  |  98  |  34<H>  ----------------------------<  97  3.5   |  31  |  2.52<H>    Ca    8.0<L>      05 Sep 2023 05:31  Phos  3.0     09-05  Mg     1.9     09-05    TPro  6.7  /  Alb  2.0<L>  /  TBili  3.0<H>  /  DBili  2.0<H>  /  AST  44<H>  /  ALT  31  /  AlkPhos  173<H>  09-05      Urinalysis Basic - ( 05 Sep 2023 05:31 )    Color: x / Appearance: x / SG: x / pH: x  Gluc: 97 mg/dL / Ketone: x  / Bili: x / Urobili: x   Blood: x / Protein: x / Nitrite: x   Leuk Esterase: x / RBC: x / WBC x   Sq Epi: x / Non Sq Epi: x / Bacteria: x      CAPILLARY BLOOD GLUCOSE      POCT Blood Glucose.: 115 mg/dL (05 Sep 2023 05:09)  POCT Blood Glucose.: 134 mg/dL (04 Sep 2023 23:06)  POCT Blood Glucose.: 104 mg/dL (04 Sep 2023 17:03)  POCT Blood Glucose.: 155 mg/dL (04 Sep 2023 12:01)    LIVER FUNCTIONS - ( 05 Sep 2023 05:31 )  Alb: 2.0 g/dL / Pro: 6.7 g/dL / ALK PHOS: 173 U/L / ALT: 31 U/L / AST: 44 U/L / GGT: x             RADIOLOGY & ADDITIONAL STUDIES:

## 2023-09-06 NOTE — PROGRESS NOTE ADULT - SUBJECTIVE AND OBJECTIVE BOX
SUBJECTIVE:   Patient seen and examined at bedside this AM   No acute overnight events   Patient well appearing without any complaints this AM   Voiding spontaneously and adequately   Decreasing enteric output        MEDICATIONS  (STANDING):  chlorhexidine 2% Cloths 1 Application(s) Topical <User Schedule>  cholestyramine Powder (Sugar-Free) 4 Gram(s) Oral two times a day  dextrose 5%. 1000 milliLiter(s) (50 mL/Hr) IV Continuous <Continuous>  dextrose 50% Injectable 25 Gram(s) IV Push once  dextrose 50% Injectable 25 Gram(s) IV Push once  diphenoxylate/atropine 2 Tablet(s) Oral every 6 hours  glucagon  Injectable 1 milliGRAM(s) IntraMuscular once  glucagon  Injectable 1 milliGRAM(s) IntraMuscular once  heparin   Injectable 5000 Unit(s) SubCutaneous every 8 hours  insulin lispro (ADMELOG) corrective regimen sliding scale   SubCutaneous every 6 hours  levothyroxine Injectable 40 MICROGram(s) IV Push <User Schedule>  loperamide 4 milliGRAM(s) Oral every 6 hours  metoprolol tartrate 25 milliGRAM(s) Oral every 12 hours  nystatin Powder 1 Application(s) Topical three times a day  octreotide  Injectable 100 MICROGram(s) IV Push every 8 hours  pantoprazole  Injectable 40 milliGRAM(s) IV Push daily  venlafaxine XR. 300 milliGRAM(s) Oral daily    MEDICATIONS  (PRN):  benzocaine/menthol Lozenge 2 Lozenge Oral every 4 hours PRN Sore Throat  dextrose Oral Gel 15 Gram(s) Oral once PRN Blood Glucose LESS THAN 70 milliGRAM(s)/deciliter  melatonin 5 milliGRAM(s) Oral at bedtime PRN Sleep      Vital Signs Last 24 Hrs  T(C): 36.4 (06 Sep 2023 01:16), Max: 36.8 (05 Sep 2023 21:43)  T(F): 97.6 (06 Sep 2023 01:16), Max: 98.2 (05 Sep 2023 21:43)  HR: 79 (06 Sep 2023 05:00) (78 - 85)  BP: 129/71 (06 Sep 2023 05:00) (98/52 - 157/69)  BP(mean): 92 (06 Sep 2023 05:00) (72 - 106)  RR: 21 (06 Sep 2023 05:00) (15 - 37)  SpO2: 96% (06 Sep 2023 05:00) (92% - 98%)    Parameters below as of 06 Sep 2023 05:00  Patient On (Oxygen Delivery Method): room air        Physical Exam:  General: NAD, resting comfortably in bed  Pulmonary: Nonlabored breathing, no respiratory distress  Cardiovascular: NSR  Abdominal: soft, NT/ND  Extremities: WWP, normal strength  Neuro: A/O x 3, CNs II-XII grossly intact, no focal deficits    I&O's Summary    04 Sep 2023 07:01  -  05 Sep 2023 07:00  --------------------------------------------------------  IN: 3064.4 mL / OUT: 2730 mL / NET: 334.4 mL    05 Sep 2023 07:01  -  06 Sep 2023 06:46  --------------------------------------------------------  IN: 1840.6 mL / OUT: 2245 mL / NET: -404.4 mL        LABS:                        7.9    11.92 )-----------( 236      ( 06 Sep 2023 05:17 )             25.3     09-06    133<L>  |  95<L>  |  32<H>  ----------------------------<  101<H>  4.0   |  32<H>  |  2.17<H>    Ca    8.3<L>      06 Sep 2023 05:17  Phos  3.6     09-06  Mg     2.0     09-06    TPro  6.7  /  Alb  2.0<L>  /  TBili  3.0<H>  /  DBili  2.0<H>  /  AST  44<H>  /  ALT  31  /  AlkPhos  173<H>  09-05      Urinalysis Basic - ( 06 Sep 2023 05:17 )    Color: x / Appearance: x / SG: x / pH: x  Gluc: 101 mg/dL / Ketone: x  / Bili: x / Urobili: x   Blood: x / Protein: x / Nitrite: x   Leuk Esterase: x / RBC: x / WBC x   Sq Epi: x / Non Sq Epi: x / Bacteria: x      CAPILLARY BLOOD GLUCOSE      POCT Blood Glucose.: 101 mg/dL (06 Sep 2023 05:14)  POCT Blood Glucose.: 117 mg/dL (05 Sep 2023 23:07)  POCT Blood Glucose.: 108 mg/dL (05 Sep 2023 19:12)  POCT Blood Glucose.: 155 mg/dL (05 Sep 2023 10:55)    LIVER FUNCTIONS - ( 05 Sep 2023 05:31 )  Alb: 2.0 g/dL / Pro: 6.7 g/dL / ALK PHOS: 173 U/L / ALT: 31 U/L / AST: 44 U/L / GGT: x             RADIOLOGY & ADDITIONAL STUDIES:   SUBJECTIVE:   Patient seen and examined at bedside this AM   No acute overnight events   Patient well appearing without any complaints this AM   Voiding spontaneously and adequately   Decreasing enteric output        MEDICATIONS  (STANDING):  chlorhexidine 2% Cloths 1 Application(s) Topical <User Schedule>  cholestyramine Powder (Sugar-Free) 4 Gram(s) Oral two times a day  dextrose 5%. 1000 milliLiter(s) (50 mL/Hr) IV Continuous <Continuous>  dextrose 50% Injectable 25 Gram(s) IV Push once  dextrose 50% Injectable 25 Gram(s) IV Push once  diphenoxylate/atropine 2 Tablet(s) Oral every 6 hours  glucagon  Injectable 1 milliGRAM(s) IntraMuscular once  glucagon  Injectable 1 milliGRAM(s) IntraMuscular once  heparin   Injectable 5000 Unit(s) SubCutaneous every 8 hours  insulin lispro (ADMELOG) corrective regimen sliding scale   SubCutaneous every 6 hours  levothyroxine Injectable 40 MICROGram(s) IV Push <User Schedule>  loperamide 4 milliGRAM(s) Oral every 6 hours  metoprolol tartrate 25 milliGRAM(s) Oral every 12 hours  nystatin Powder 1 Application(s) Topical three times a day  octreotide  Injectable 100 MICROGram(s) IV Push every 8 hours  pantoprazole  Injectable 40 milliGRAM(s) IV Push daily  venlafaxine XR. 300 milliGRAM(s) Oral daily    MEDICATIONS  (PRN):  benzocaine/menthol Lozenge 2 Lozenge Oral every 4 hours PRN Sore Throat  dextrose Oral Gel 15 Gram(s) Oral once PRN Blood Glucose LESS THAN 70 milliGRAM(s)/deciliter  melatonin 5 milliGRAM(s) Oral at bedtime PRN Sleep      Vital Signs Last 24 Hrs  T(C): 36.4 (06 Sep 2023 01:16), Max: 36.8 (05 Sep 2023 21:43)  T(F): 97.6 (06 Sep 2023 01:16), Max: 98.2 (05 Sep 2023 21:43)  HR: 79 (06 Sep 2023 05:00) (78 - 85)  BP: 129/71 (06 Sep 2023 05:00) (98/52 - 157/69)  BP(mean): 92 (06 Sep 2023 05:00) (72 - 106)  RR: 21 (06 Sep 2023 05:00) (15 - 37)  SpO2: 96% (06 Sep 2023 05:00) (92% - 98%)    Parameters below as of 06 Sep 2023 05:00  Patient On (Oxygen Delivery Method): room air        Physical Exam:  General: NAD, resting comfortably in bed  C/V: NSR  Pulm: Nonlabored breathing, no respiratory distress  Abd: soft, wound vac in place with adequate seal and suction (25/175 cc), Poole drain to LIS in fistula (100 cc) with surrounding wound manager to gravity (250/250 cc), JOYCE drain in left abdomen (05/70), ostomy pink & patent (0/0 cc)   : Adequate UOP (500/1650 cc)  Extrem: WWP, no edema, SCDs in place   Neuro: A&Ox4; no focal deficits       I&O's Summary    04 Sep 2023 07:01  -  05 Sep 2023 07:00  --------------------------------------------------------  IN: 3064.4 mL / OUT: 2730 mL / NET: 334.4 mL    05 Sep 2023 07:01  -  06 Sep 2023 06:46  --------------------------------------------------------  IN: 1840.6 mL / OUT: 2245 mL / NET: -404.4 mL        LABS:                        7.9    11.92 )-----------( 236      ( 06 Sep 2023 05:17 )             25.3     09-06    133<L>  |  95<L>  |  32<H>  ----------------------------<  101<H>  4.0   |  32<H>  |  2.17<H>    Ca    8.3<L>      06 Sep 2023 05:17  Phos  3.6     09-06  Mg     2.0     09-06    TPro  6.7  /  Alb  2.0<L>  /  TBili  3.0<H>  /  DBili  2.0<H>  /  AST  44<H>  /  ALT  31  /  AlkPhos  173<H>  09-05      Urinalysis Basic - ( 06 Sep 2023 05:17 )    Color: x / Appearance: x / SG: x / pH: x  Gluc: 101 mg/dL / Ketone: x  / Bili: x / Urobili: x   Blood: x / Protein: x / Nitrite: x   Leuk Esterase: x / RBC: x / WBC x   Sq Epi: x / Non Sq Epi: x / Bacteria: x      CAPILLARY BLOOD GLUCOSE      POCT Blood Glucose.: 101 mg/dL (06 Sep 2023 05:14)  POCT Blood Glucose.: 117 mg/dL (05 Sep 2023 23:07)  POCT Blood Glucose.: 108 mg/dL (05 Sep 2023 19:12)  POCT Blood Glucose.: 155 mg/dL (05 Sep 2023 10:55)    LIVER FUNCTIONS - ( 05 Sep 2023 05:31 )  Alb: 2.0 g/dL / Pro: 6.7 g/dL / ALK PHOS: 173 U/L / ALT: 31 U/L / AST: 44 U/L / GGT: x             RADIOLOGY & ADDITIONAL STUDIES:

## 2023-09-06 NOTE — CHART NOTE - NSCHARTNOTEFT_GEN_A_CORE
Admitting Diagnosis:   Patient is a 78y old  Male who presents with a chief complaint of Surgery (27 Aug 2023 13:44)      PAST MEDICAL & SURGICAL HISTORY:  Essential hypertension  Hypertension      Atrial fibrillation  s/p cardioversion  and   Pt. reports 4 DCCV  Now on Amiodarone 200mg PO bid and Eliquis 5mg PO bid  Last DCCV 4 yrs ago at [facility]      Crohn's disease  s/p partial resection of ileum      Hyperlipidemia      Hypothyroidism      History of depression  On Venlafaxine ER 150mg PO bid      H/O knee surgery      History of cataract surgery          Current Nutrition Order: TPN via PICC- 340g Dex, 172g AA, 100g SMOF lipids; provides 2844 kcals, 172g protein, GIR 2.54 mg/kg/min    PO Intake: Good (%) [   ]  Fair (50-75%) [   ] Poor (<25%) [   ]- N/A    GI Issues: no n/v/abd pain noted. See subjective below    Pain: no pain/discomfort noted    Skin Integrity: R heel DTI, LUQ JOYCE, mid abd wound vac, EC fistula draining via red rubber, ileostomy    Labs:       133<L>  |  95<L>  |  32<H>  ----------------------------<  101<H>  4.0   |  32<H>  |  2.17<H>    Ca    8.3<L>      06 Sep 2023 05:17  Phos  3.6     -  Mg     2.0         TPro  6.7  /  Alb  2.0<L>  /  TBili  3.0<H>  /  DBili  2.0<H>  /  AST  44<H>  /  ALT  31  /  AlkPhos  173<H>      CAPILLARY BLOOD GLUCOSE      POCT Blood Glucose.: 121 mg/dL (06 Sep 2023 11:15)  POCT Blood Glucose.: 101 mg/dL (06 Sep 2023 05:14)  POCT Blood Glucose.: 117 mg/dL (05 Sep 2023 23:07)  POCT Blood Glucose.: 108 mg/dL (05 Sep 2023 19:12)      Medications:  MEDICATIONS  (STANDING):  chlorhexidine 2% Cloths 1 Application(s) Topical <User Schedule>  cholestyramine Powder (Sugar-Free) 4 Gram(s) Oral two times a day  dextrose 5%. 1000 milliLiter(s) (50 mL/Hr) IV Continuous <Continuous>  dextrose 50% Injectable 25 Gram(s) IV Push once  dextrose 50% Injectable 25 Gram(s) IV Push once  diphenoxylate/atropine 2 Tablet(s) Oral every 6 hours  glucagon  Injectable 1 milliGRAM(s) IntraMuscular once  glucagon  Injectable 1 milliGRAM(s) IntraMuscular once  heparin   Injectable 5000 Unit(s) SubCutaneous every 8 hours  insulin lispro (ADMELOG) corrective regimen sliding scale   SubCutaneous every 6 hours  levothyroxine Injectable 40 MICROGram(s) IV Push <User Schedule>  lipid, fat emulsion (Fish Oil and Plant Based) 20% Infusion 1 Gm/kG/Day (41.67 mL/Hr) IV Continuous <Continuous>  loperamide 4 milliGRAM(s) Oral every 6 hours  metoprolol tartrate 25 milliGRAM(s) Oral every 12 hours  nystatin Powder 1 Application(s) Topical three times a day  octreotide  Injectable 100 MICROGram(s) IV Push every 8 hours  pantoprazole  Injectable 40 milliGRAM(s) IV Push daily  Parenteral Nutrition - Adult 1 Each (104 mL/Hr) TPN Continuous <Continuous>  venlafaxine XR. 300 milliGRAM(s) Oral daily    MEDICATIONS  (PRN):  benzocaine/menthol Lozenge 2 Lozenge Oral every 4 hours PRN Sore Throat  dextrose Oral Gel 15 Gram(s) Oral once PRN Blood Glucose LESS THAN 70 milliGRAM(s)/deciliter  melatonin 5 milliGRAM(s) Oral at bedtime PRN Sleep      Daily Weight in k.9 (06 Sep 2023 01:16)  Daily 91.8kg [27 Aug 2023]  Daily 101kg [9 Aug 2023]  Daily   102.1kg [10 July 2023]  Daily 99.9kg [2023]    Weight Change: wt fluctuations throughout admission, trending down- ? due to outputs vs. fluid shifts vs. inadequate nutrition. Recommend weekly weights for trending/monitoring adequacy of nutrition provision    Estimated energy needs:   Ideal body weight (86.4kg) used for calculations as pt >100% of IBW, BMI <30 per [facility] Standards of Care. Needs estimated for age and adjusted for current clinical status, increased needs for post-op & open abd wound healing    Calories: 6993-3106 kcals based on 30-35 kcals/kg  Protein: 172.8-216 g protein based on 2.0-2.5g protein/kg  *Fluid needs per team    Subjective:   78M w/ Crohn's, AFib/Flutter s/p DCCVs on amiodarone, remote ileocectomy and open appy here for SBO vs Crohns flare, s/p NGT decompression and now s/p lap TRE converted to open TRE, SBR x 3, left in discontinuity with abthera vac on , RTOR for ileocolic resection, small bowel anastomosis, and abdominal wall closure on , and s/p IR aspiration of perihepatic fluid on 7/3, stepped down to telemetry on . wound dehiscence on , RTOR ex-lap, washout, ileocolic resection with end ileostomy, blow hole colostomy, red rubber from ileostomy to small bowel anastomosis; Vicryl bridging mesh on . Transferred to SICU post op for HD monitoring. Extubated  and SDU . Has been recovering on telemetry with ECF management and prolonged TPN. Upgraded to SICU  for acute delirium and tremors, r/o sepsis vs metabolic encephalopathy. Ammonia levels normal. TPN DC'ed and started on PO diet. Stepped down to 8 L[place]man on .     Chart reviewed. Labs & Rx reviewed. Pt seen at bedside on 5 EA with IDT during AM rounds. Pt transferred to SICU due to high enteric output, post-renal ELVI. PO diet stopped due to worsening leakage of enteric content from fistula and increased wound vac output. PICC replaced and TPN resumed . BUN & Creat improving, to increase protein provision provided. TPN ordered for tonight- increasing lipid provision, to provide 32.9 kcals/kg, 2.0g protein/kg, 24.9 non-protein kcals/kg IBW. Outputs x 24hrs: 90ml L JOYCE drain, 250ml mid abd wound vac, 295ml ileostomy, 500ml EC fistula--- improving. On chyolestyramine, imodium. RDN will continue to monitor, reassess, and intervene as appropriate.     Previous Nutrition Diagnosis: Increased Nutrient Needs R/T physiological demands for nutrient AEB post-op wound healing    Active [ X  ]  Resolved [   ]    If resolved, new PES:     Goal:  Pt will meet at least 75% of protein & energy needs via most appropriate route for nutrition     Recommendations:  1. c/w TPN via PICC-  340g Dex, 172g AA, 100g SMOF lipids; provides 2844 kcals, 172g protein, GIR 2.54 mg/kg/min; provides 32.9 kcals/kg, 2.0g protein/kg, 24.9 non-protein kcals/kg IBW  - fluids & lytes per team  - c/w MVI, B1, Vit C, Zinc in TPN bag  2. Monitor lipid panel and LFTs, weekly lipid panel  - hold lipids if TG >400  - consider decreasing lipids if s/s cholestasis / intolerance  3. Daily BMP/Mg/Phos, POC BG Q4-6hrs  4. Monitor feasibility of resuming PO diet  5. Pain regimen per team    Education: deferred    Risk Level: High [ X  ] Moderate [   ] Low [   ]

## 2023-09-06 NOTE — PROGRESS NOTE ADULT - ASSESSMENT
78M w/ Crohn's, AFib/Flutter s/p DCCVs on amiodarone, remote ileocectomy and open appy here for SBO vs Crohns flare, s/p NGT decompression and now s/p lap TRE converted to open TRE, SBR x 3, left in discontinuity with abthera vac on 6/27, RTOR for ileocolic resection, small bowel anastomosis, and abdominal wall closure on 6/28, and s/p IR aspiration of perihepatic fluid on 7/3, stepped down to telemetry on 7/4. wound dehiscence on 7/5, RTOR ex-lap, washout, ileocolic resection with end ileostomy, blow hole colostomy, red rubber from ileostomy to small bowel anastomosis; Vicryl bridging mesh on 7/5. Transferred to SICU post op for HD monitoring. Extubated 7/6. Surgical wound with decreasing in size and granulating with Vac. Tentative plan for skin graft per Plastics; timing TBD pending proper wound shrinkage and granulation   8/23: Upgraded to SICU 8/23 for acute delirium and tremors, r/o sepsis vs metabolic encephalopathy. Ammonia levels normal    8/25: Spiked temp to 101.3 overnight w/ new leukocytosis to 14   8/28: Downgraded from SICU. No growth from blood cultures. Abx x 5 days until 8/29 8/30: Leukocytosis resolved   9/1: Decreased UOP, soft pressures. Cr 3.08 from 1.12. C/f dehydration 2/2 high fistula output vs ELVI   9/2: Upgraded to SICU for worsening ELVI   ELVI improving (Cr 2.52 from 3.13, BUN 34 from 38). Enteric output decreasing, Direct hyperbilirubinemia stable (last 24 hrs) but improving overall. Transaminitis improving. RUQ US 8/26 showed sludge and GB distention w/o evidence of inflammation. RP ultrasound 9/4 unremarkable     Plan:   - NPO/IVF/TPN (increase Na and Cl)   - Wound vac change Friday  - No ostomy output; d/c Lomotil   - Monitor fistula drain, ileostomy, wound vac and fistula wound manager output   - Strict I/O; monitor Cr trend   - PRN pain and nausea control   - Hx of A-fib/flutter; off Amio, on Lopressor PO BID. Appreciate Cards/EP input   - Encourage IS/OOB   - DVT ppx   - PT   - Tentative plan for skin graft per Plastics; timing TBD pending proper wound granulation     D/w SICU, chief resident and attending  78M w/ Crohn's, AFib/Flutter s/p DCCVs on amiodarone, remote ileocectomy and open appy here for SBO vs Crohns flare, s/p NGT decompression and now s/p lap TRE converted to open TRE, SBR x 3, left in discontinuity with abthera vac on 6/27, RTOR for ileocolic resection, small bowel anastomosis, and abdominal wall closure on 6/28, and s/p IR aspiration of perihepatic fluid on 7/3, stepped down to telemetry on 7/4. wound dehiscence on 7/5, RTOR ex-lap, washout, ileocolic resection with end ileostomy, blow hole colostomy, red rubber from ileostomy to small bowel anastomosis; Vicryl bridging mesh on 7/5. Transferred to SICU post op for HD monitoring. Extubated 7/6. Surgical wound with decreasing in size and granulating with Vac. Tentative plan for skin graft per Plastics; timing TBD pending proper wound shrinkage and granulation   8/23: Upgraded to SICU 8/23 for acute delirium and tremors, r/o sepsis vs metabolic encephalopathy. Ammonia levels normal    8/25: Spiked temp to 101.3 overnight w/ new leukocytosis to 14   8/28: Downgraded from SICU. No growth from blood cultures. Abx x 5 days until 8/29 8/30: Leukocytosis resolved   9/1: Decreased UOP, soft pressures. Cr 3.08 from 1.12. C/f dehydration 2/2 high fistula output vs ELVI   9/2: Upgraded to SICU for worsening ELVI   ELVI improving (Cr 2.17 from 2.52, BUN 32 from 34). Enteric output decreasing, Direct hyperbilirubinemia and transaminitis improved. RUQ US 8/26 showed sludge and GB distention w/o evidence of inflammation. RP ultrasound 9/4 unremarkable. Hemodynamically normal     Plan:   - NPO/IVF/TPN (increase Na and Cl in TPN)   - Wound vac change Friday  - No ostomy output; consider d/c Lomotil   - Monitor fistula drain, ileostomy, wound vac and fistula wound manager output   - Strict I/O; monitor Cr trend   - PRN pain and nausea control   - Hx of A-fib/flutter; off Amio, on Lopressor PO BID. Appreciate Cards/EP input   - Encourage IS/OOB   - DVT ppx   - PT   - Step down from SICU  - Tentative plan for skin graft per Plastics; timing TBD pending proper wound granulation     D/w SICU, chief resident and attending

## 2023-09-06 NOTE — PROGRESS NOTE ADULT - ASSESSMENT
78M w/ Crohn's, AFib/Flutter s/p DCCVs on amiodarone, remote ileocectomy and open appy here for SBO vs Crohns flare, s/p NGT decompression and now s/p lap TRE converted to open TRE, SBR x 3, left in discontinuity with abthera vac on 6/27, RTOR for ileocolic resection, small bowel anastomosis, and abdominal wall closure on 6/28, and s/p IR aspiration of perihepatic fluid on 7/3, stepped down to telemetry on 7/4. wound dehiscence on 7/5, RTOR ex-lap, washout, ileocolic resection with end ileostomy, blow hole colostomy, red rubber from ileostomy to small bowel anastomosis; Vicryl bridging mesh on 7/5. Transferred to SICU post op for HD monitoring. Extubated 7/6. Surgical wound with decreasing in size and granulating with Vac. Tentative plan for skin graft per Plastics; timing TBD pending proper wound shrinkage and granulation   8/23: Upgraded to SICU 8/23 for acute delirium and tremors, r/o sepsis vs metabolic encephalopathy. Ammonia levels normal    8/25: Spiked temp to 101.3 overnight w/ new leukocytosis to 14   8/28: Downgraded from SICU. No growth from blood cultures. Abx x 5 days until 8/29 8/30: Leukocytosis resolved   9/1: Decreased UOP, soft pressures. Cr 3.08 from 1.12. C/f dehydration 2/2 high fistula output vs ELVI   9/2: Upgraded to SICU for worsening ELVI   ELVI improving (Cr 2.17 from 2.52, BUN 32 from 34). Enteric output decreasing, Direct hyperbilirubinemia and transaminitis improved. RUQ US 8/26 showed sludge and GB distention w/o evidence of inflammation. RP ultrasound 9/4 unremarkable. Hemodynamically normal     Plan:   - NPO/IVF/TPN (increase Na and Cl in TPN)   - Wound vac change Friday 9/8  - No ostomy output; consider d/c Lomotil   - Monitor fistula drain, ileostomy, wound vac and fistula wound manager output   - Strict I/O; monitor Cr trend   - PRN pain and nausea control   - Hx of A-fib/flutter; off Amio, on Lopressor PO BID. Appreciate Cards/EP input   - Encourage IS/OOB   - DVT ppx   - PT

## 2023-09-06 NOTE — PROGRESS NOTE ADULT - SUBJECTIVE AND OBJECTIVE BOX
SUBJECTIVE:  Patient seen and examined at bedside this AM     Over the weekend was upgraded to SICU for worsening ELVI in setting of high enteric output, FeNa post-renal, US of kidneys with only mild hydronephrosis. Started on Lomotil & Octreotide, imodium   No acute overnight events     Patient well appearing without any complaints this AM   Voiding spontaneously and adequately   Enteric output decreasing     MEDICATIONS  (STANDING):  chlorhexidine 2% Cloths 1 Application(s) Topical <User Schedule>  cholestyramine Powder (Sugar-Free) 4 Gram(s) Oral two times a day  dextrose 5%. 1000 milliLiter(s) (50 mL/Hr) IV Continuous <Continuous>  dextrose 50% Injectable 25 Gram(s) IV Push once  dextrose 50% Injectable 25 Gram(s) IV Push once  diphenoxylate/atropine 2 Tablet(s) Oral every 6 hours  glucagon  Injectable 1 milliGRAM(s) IntraMuscular once  glucagon  Injectable 1 milliGRAM(s) IntraMuscular once  heparin   Injectable 5000 Unit(s) SubCutaneous every 8 hours  insulin lispro (ADMELOG) corrective regimen sliding scale   SubCutaneous every 6 hours  levothyroxine Injectable 40 MICROGram(s) IV Push <User Schedule>  lipid, fat emulsion (Fish Oil and Plant Based) 20% Infusion 1 Gm/kG/Day (41.67 mL/Hr) IV Continuous <Continuous>  loperamide 4 milliGRAM(s) Oral every 6 hours  metoprolol tartrate 25 milliGRAM(s) Oral every 12 hours  nystatin Powder 1 Application(s) Topical three times a day  octreotide  Injectable 100 MICROGram(s) IV Push every 8 hours  pantoprazole  Injectable 40 milliGRAM(s) IV Push daily  Parenteral Nutrition - Adult 1 Each (104 mL/Hr) TPN Continuous <Continuous>  venlafaxine XR. 300 milliGRAM(s) Oral daily    MEDICATIONS  (PRN):  benzocaine/menthol Lozenge 2 Lozenge Oral every 4 hours PRN Sore Throat  dextrose Oral Gel 15 Gram(s) Oral once PRN Blood Glucose LESS THAN 70 milliGRAM(s)/deciliter  melatonin 5 milliGRAM(s) Oral at bedtime PRN Sleep      Vital Signs Last 24 Hrs  T(C): 37.1 (06 Sep 2023 09:00), Max: 37.1 (06 Sep 2023 09:00)  T(F): 98.7 (06 Sep 2023 09:00), Max: 98.7 (06 Sep 2023 09:00)  HR: 79 (06 Sep 2023 12:00) (78 - 85)  BP: 108/61 (06 Sep 2023 12:00) (99/57 - 157/69)  BP(mean): 76 (06 Sep 2023 12:00) (73 - 106)  RR: 33 (06 Sep 2023 12:00) (15 - 33)  SpO2: 99% (06 Sep 2023 12:00) (93% - 99%)    Parameters below as of 06 Sep 2023 10:00  Patient On (Oxygen Delivery Method): room air    Physical Exam:  General: NAD, resting comfortably in bed  C/V: NSR  Pulm: Nonlabored breathing, no respiratory distress  Abd: soft, wound vac in place with adequate seal and suction (25/175 cc), Poole drain to LIS in fistula (100 cc) with surrounding wound manager to gravity (250/250 cc), JOYCE drain in left abdomen (05/70), ostomy pink & patent (0/0 cc)   : Adequate UOP (500/1650 cc)  Extrem: WWP, no edema, SCDs in place   Neuro: A&Ox4; no focal deficits     I&O's Summary    05 Sep 2023 07:01  -  06 Sep 2023 07:00  --------------------------------------------------------  IN: 1974.6 mL / OUT: 3185 mL / NET: -1210.4 mL    06 Sep 2023 07:01  -  06 Sep 2023 13:26  --------------------------------------------------------  IN: 335 mL / OUT: 685 mL / NET: -350 mL    LABS:                        7.9    11.92 )-----------( 236      ( 06 Sep 2023 05:17 )             25.3     09-06    133<L>  |  95<L>  |  32<H>  ----------------------------<  101<H>  4.0   |  32<H>  |  2.17<H>    Ca    8.3<L>      06 Sep 2023 05:17  Phos  3.6     09-06  Mg     2.0     09-06    TPro  6.7  /  Alb  2.0<L>  /  TBili  3.0<H>  /  DBili  2.0<H>  /  AST  44<H>  /  ALT  31  /  AlkPhos  173<H>  09-05      Urinalysis Basic - ( 06 Sep 2023 05:17 )    Color: x / Appearance: x / SG: x / pH: x  Gluc: 101 mg/dL / Ketone: x  / Bili: x / Urobili: x   Blood: x / Protein: x / Nitrite: x   Leuk Esterase: x / RBC: x / WBC x   Sq Epi: x / Non Sq Epi: x / Bacteria: x      CAPILLARY BLOOD GLUCOSE      POCT Blood Glucose.: 121 mg/dL (06 Sep 2023 11:15)  POCT Blood Glucose.: 101 mg/dL (06 Sep 2023 05:14)  POCT Blood Glucose.: 117 mg/dL (05 Sep 2023 23:07)  POCT Blood Glucose.: 108 mg/dL (05 Sep 2023 19:12)    LIVER FUNCTIONS - ( 05 Sep 2023 05:31 )  Alb: 2.0 g/dL / Pro: 6.7 g/dL / ALK PHOS: 173 U/L / ALT: 31 U/L / AST: 44 U/L / GGT: x             RADIOLOGY & ADDITIONAL STUDIES:

## 2023-09-06 NOTE — PROGRESS NOTE ADULT - SUBJECTIVE AND OBJECTIVE BOX
Week while I was on vacation reviewed with all consultants  Renal function returning towards normal  Urine output normalizing  Output from the fistula back to approximately 1 L a day  Restarted on wall antidiarrheals  Vital signs are stable  Afebrile  Wound is myles appropriately    Will continue nothing by mouth status  Will continue TPN  We will continue wound care

## 2023-09-06 NOTE — PROGRESS NOTE ADULT - ATTENDING COMMENTS
Crohn's, AF s/p SBR, ileocolic resection, end ileostomy, enteroatmospheric fistula   resolving ATN  physical as above  renal numbers better  TPN written  PT progressing   rest as above

## 2023-09-06 NOTE — PROGRESS NOTE ADULT - ASSESSMENT
78 M w/ Crohn's, AFib/Flutter s/p DCCVs on amiodarone, remote ileocectomy and open appendectomy. Admitted (6/23) for SBO vs Crohns flare, s/p NGT decompression and s/p lap TRE converted to open TRE, SBR x 3, left in discontinuity with abthera vac on (6/27), RTOR for ileocolic resection, small bowel anastomosis, and abdominal wall closure on (6/28), c/b fluid collection s/p IR aspiration of perihepatic fluid on (7/3), c/b wound dehiscence s/p RTOR exlap, washout, ileocolic resection with end ileostomy, blow hole colostomy, red rubber from ileostomy to small bowel anastomosis; vicryl bridging mesh on (7/5). hospital course include interval development of EC fistulas and prolonged TPN for nutritional support, brief episode of AMS 8/23 (resolved within 1day), back to SICU 9/1 for hyponatremia (Na 122), oliguric ELVI, concern for hypovolemia,     NEURO: AOX3, w/ recent altered mental status less than 48hours possibly due to scopolamine patch (8/23). Hx of depression - Continue outpatient effexor. Pain control with Dilaudid for vac changes.   CV: Hx Afib/flutter: s/p DCCV, Continue Metoprolol 25 BID, no need for AC if remains in sinus, Off amiodarone given transaminitis and elevated T bili, Hx HTN: holding Norvasc, Losartan. Hx HLD: hold Lipitor given LFTs. TTE  (7/18) - PASP 64mmHg, EF 65-70%,   PULM: no resp distress on RA  GI/FEN: Previously on CLD with subsequent high fistula/wound vac output, currently NPO, started on Immodium lomotil and Restarted Octreotide 100mcg q8. fistula/vac output had slowed down. cont TPN/PICC. hyponatremia improved.  Transaminitis of unclear etiology: distended gallbladder on CT 8/25, RUQ US negative. Monitor output from EC fistula and Wound vac daily.   : Pre-renal ELVI/ATN: from high output fistula, now having adequate UO, Cr improving. Nephrology consulted. Hyponatremia resolved. UA positive but asysm, no abx, improving Prot/creat ratio  ENDO: mISS. Hx hypothyroid: IV Synthroid, TSH 9 (8/23), recheck in 2-3 weeks.   ID: Currently off abx.   PREVIOUSLY: C. tertium, Lactobacillis from his IR cx 7/3, and candida albicans, lactobacillus, vanc sensitive E faecium, vanc resistant E gallinarum, vanc resistant E casseliflavus, lactobacillus from his OR cx 7/5. completed imipenem (6/30-7/12, 7/23-7/30), Dapto (6/30-7/5 and 7/23-7/24, 8/23-8/24), cefepime (8/23-24).   PPX: SCDs, SQH.   LINES: PIVs, LUE PICC (9/1 - )  WOUNDS/DRAINS: ECF abdominal wound with vac change Mon/Fri--next vac change on 9/5. PT: Home health PT  DISPO: SICU, possible SDU today     78 M w/ Crohn's, AFib/Flutter s/p DCCVs on amiodarone, remote ileocectomy and open appendectomy. Admitted (6/23) for SBO vs Crohns flare, s/p NGT decompression and s/p lap TRE converted to open TRE, SBR x 3, left in discontinuity with abthera vac on (6/27), RTOR for ileocolic resection, small bowel anastomosis, and abdominal wall closure on (6/28), c/b fluid collection s/p IR aspiration of perihepatic fluid on (7/3), c/b wound dehiscence s/p RTOR exlap, washout, ileocolic resection with end ileostomy, blow hole colostomy, red rubber from ileostomy to small bowel anastomosis; vicryl bridging mesh on (7/5). hospital course include interval development of EC fistulas and prolonged TPN for nutritional support, brief episode of AMS 8/23 (resolved within 1day), back to SICU 9/1 for hyponatremia (Na 122), oliguric ELVI, concern for hypovolemia,     NEURO: AOX3, w/ recent altered mental status less than 48hours possibly due to scopolamine patch (8/23). Hx of depression - Continue outpatient effexor. Pain control with Dilaudid for vac changes.   CV: Hx Afib/flutter: s/p DCCV, Continue Metoprolol 25 BID, no need for AC if remains in sinus, Off amiodarone given transaminitis and elevated T bili, Hx HTN: holding Norvasc, Losartan. Hx HLD: hold Lipitor given LFTs. TTE  (7/18) - PASP 64mmHg, EF 65-70%,   PULM: no resp distress on RA  GI/FEN: Previously on CLD with subsequent high fistula/wound vac output, currently NPO, started on Immodium lomotil and Restarted Octreotide 100mcg q8. fistula/vac output had slowed down. cont TPN/PICC. hyponatremia improved.  Transaminitis of unclear etiology: distended gallbladder on CT 8/25, RUQ US negative. Monitor output from EC fistula and Wound vac daily. On exam vac machine was off and would not turn back on, nurse reported seeing it on with suction in the last hour. It was replaced prompted within the 20 minutes.   : Pre-renal ELVI/ATN: from high output fistula, now having adequate UO, Cr improving. Nephrology consulted. Hyponatremia resolved. UA positive but asysm, no abx, improving Prot/creat ratio  ENDO: mISS. Hx hypothyroid: IV Synthroid, TSH 9 (8/23), recheck in 2-3 weeks.   ID: Currently off abx.   PREVIOUSLY: C. tertium, Lactobacillis from his IR cx 7/3, and candida albicans, lactobacillus, vanc sensitive E faecium, vanc resistant E gallinarum, vanc resistant E casseliflavus, lactobacillus from his OR cx 7/5. completed imipenem (6/30-7/12, 7/23-7/30), Dapto (6/30-7/5 and 7/23-7/24, 8/23-8/24), cefepime (8/23-24).   PPX: SCDs, SQH.   Skin: right and left heels clear of any skin breakdown, pink cushion band aid replaced for both heels  LINES: PIVs, LUE PICC (9/1 - )  WOUNDS/DRAINS: ECF abdominal wound with vac change Mon/Fri--next vac change on 9/5. PT: Home health PT  DISPO: SICU, possible SDU today

## 2023-09-06 NOTE — PROGRESS NOTE ADULT - SUBJECTIVE AND OBJECTIVE BOX
Overnight events: No acute overnight events.     SUBJECTIVE: Patient seen at bedside.       MEDICATIONS  (STANDING):  chlorhexidine 2% Cloths 1 Application(s) Topical <User Schedule>  cholestyramine Powder (Sugar-Free) 4 Gram(s) Oral two times a day  dextrose 5%. 1000 milliLiter(s) (50 mL/Hr) IV Continuous <Continuous>  dextrose 50% Injectable 25 Gram(s) IV Push once  dextrose 50% Injectable 25 Gram(s) IV Push once  diphenoxylate/atropine 2 Tablet(s) Oral every 6 hours  glucagon  Injectable 1 milliGRAM(s) IntraMuscular once  glucagon  Injectable 1 milliGRAM(s) IntraMuscular once  heparin   Injectable 5000 Unit(s) SubCutaneous every 8 hours  insulin lispro (ADMELOG) corrective regimen sliding scale   SubCutaneous every 6 hours  levothyroxine Injectable 40 MICROGram(s) IV Push <User Schedule>  loperamide 4 milliGRAM(s) Oral every 6 hours  metoprolol tartrate 25 milliGRAM(s) Oral every 12 hours  nystatin Powder 1 Application(s) Topical three times a day  octreotide  Injectable 100 MICROGram(s) IV Push every 8 hours  pantoprazole  Injectable 40 milliGRAM(s) IV Push daily  venlafaxine XR. 300 milliGRAM(s) Oral daily    MEDICATIONS  (PRN):  benzocaine/menthol Lozenge 2 Lozenge Oral every 4 hours PRN Sore Throat  dextrose Oral Gel 15 Gram(s) Oral once PRN Blood Glucose LESS THAN 70 milliGRAM(s)/deciliter  melatonin 5 milliGRAM(s) Oral at bedtime PRN Sleep      Vital Signs Last 24 Hrs  T(C): 36.4 (06 Sep 2023 01:16), Max: 36.8 (05 Sep 2023 21:43)  T(F): 97.6 (06 Sep 2023 01:16), Max: 98.2 (05 Sep 2023 21:43)  HR: 79 (06 Sep 2023 05:00) (78 - 85)  BP: 129/71 (06 Sep 2023 05:00) (98/52 - 157/69)  BP(mean): 92 (06 Sep 2023 05:00) (72 - 106)  RR: 21 (06 Sep 2023 05:00) (15 - 37)  SpO2: 96% (06 Sep 2023 05:00) (92% - 98%)    Parameters below as of 06 Sep 2023 05:00  Patient On (Oxygen Delivery Method): room air        Physical Exam:  General: NAD, resting comfortably in bed  Pulmonary: Nonlabored breathing, no respiratory distress  Cardiovascular: NSR  Abdominal: soft, NT/ND  Extremities: WWP, normal strength  Neuro: A/O x 3, CNs II-XII grossly intact, no focal deficits, normal motor/sensation  Pulses: palpable distal pulses    I&O's Summary    04 Sep 2023 07:01  -  05 Sep 2023 07:00  --------------------------------------------------------  IN: 3064.4 mL / OUT: 2730 mL / NET: 334.4 mL    05 Sep 2023 07:01  -  06 Sep 2023 06:12  --------------------------------------------------------  IN: 1840.6 mL / OUT: 2245 mL / NET: -404.4 mL        LABS:                        7.9    11.92 )-----------( 236      ( 06 Sep 2023 05:17 )             25.3     09-06    133<L>  |  95<L>  |  32<H>  ----------------------------<  101<H>  4.0   |  32<H>  |  2.17<H>    Ca    8.3<L>      06 Sep 2023 05:17  Phos  3.6     09-06  Mg     2.0     09-06    TPro  6.7  /  Alb  2.0<L>  /  TBili  3.0<H>  /  DBili  2.0<H>  /  AST  44<H>  /  ALT  31  /  AlkPhos  173<H>  09-05      Urinalysis Basic - ( 06 Sep 2023 05:17 )    Color: x / Appearance: x / SG: x / pH: x  Gluc: 101 mg/dL / Ketone: x  / Bili: x / Urobili: x   Blood: x / Protein: x / Nitrite: x   Leuk Esterase: x / RBC: x / WBC x   Sq Epi: x / Non Sq Epi: x / Bacteria: x      CAPILLARY BLOOD GLUCOSE      POCT Blood Glucose.: 101 mg/dL (06 Sep 2023 05:14)  POCT Blood Glucose.: 117 mg/dL (05 Sep 2023 23:07)  POCT Blood Glucose.: 108 mg/dL (05 Sep 2023 19:12)  POCT Blood Glucose.: 155 mg/dL (05 Sep 2023 10:55)    LIVER FUNCTIONS - ( 05 Sep 2023 05:31 )  Alb: 2.0 g/dL / Pro: 6.7 g/dL / ALK PHOS: 173 U/L / ALT: 31 U/L / AST: 44 U/L / GGT: x             RADIOLOGY & ADDITIONAL STUDIES:   Overnight events: No acute overnight events.     SUBJECTIVE: Patient seen at bedside. Patient resting in bed. He denies any current pain or nausea. Patient does report mild irritation on his right heel.       MEDICATIONS  (STANDING):  chlorhexidine 2% Cloths 1 Application(s) Topical <User Schedule>  cholestyramine Powder (Sugar-Free) 4 Gram(s) Oral two times a day  dextrose 5%. 1000 milliLiter(s) (50 mL/Hr) IV Continuous <Continuous>  dextrose 50% Injectable 25 Gram(s) IV Push once  dextrose 50% Injectable 25 Gram(s) IV Push once  diphenoxylate/atropine 2 Tablet(s) Oral every 6 hours  glucagon  Injectable 1 milliGRAM(s) IntraMuscular once  glucagon  Injectable 1 milliGRAM(s) IntraMuscular once  heparin   Injectable 5000 Unit(s) SubCutaneous every 8 hours  insulin lispro (ADMELOG) corrective regimen sliding scale   SubCutaneous every 6 hours  levothyroxine Injectable 40 MICROGram(s) IV Push <User Schedule>  loperamide 4 milliGRAM(s) Oral every 6 hours  metoprolol tartrate 25 milliGRAM(s) Oral every 12 hours  nystatin Powder 1 Application(s) Topical three times a day  octreotide  Injectable 100 MICROGram(s) IV Push every 8 hours  pantoprazole  Injectable 40 milliGRAM(s) IV Push daily  venlafaxine XR. 300 milliGRAM(s) Oral daily    MEDICATIONS  (PRN):  benzocaine/menthol Lozenge 2 Lozenge Oral every 4 hours PRN Sore Throat  dextrose Oral Gel 15 Gram(s) Oral once PRN Blood Glucose LESS THAN 70 milliGRAM(s)/deciliter  melatonin 5 milliGRAM(s) Oral at bedtime PRN Sleep      Vital Signs Last 24 Hrs  T(C): 36.4 (06 Sep 2023 01:16), Max: 36.8 (05 Sep 2023 21:43)  T(F): 97.6 (06 Sep 2023 01:16), Max: 98.2 (05 Sep 2023 21:43)  HR: 79 (06 Sep 2023 05:00) (78 - 85)  BP: 129/71 (06 Sep 2023 05:00) (98/52 - 157/69)  BP(mean): 92 (06 Sep 2023 05:00) (72 - 106)  RR: 21 (06 Sep 2023 05:00) (15 - 37)  SpO2: 96% (06 Sep 2023 05:00) (92% - 98%)    Parameters below as of 06 Sep 2023 05:00  Patient On (Oxygen Delivery Method): room air        Physical Exam:  General: NAD, resting comfortably in bed  Pulmonary: Nonlabored breathing, no respiratory distress, lungs clear bilaterally   Cardiovascular: NSR, normal rate and rhythm no murmurs   Abdominal: soft, vac in place with suction, estefania drain on left side with minimal dark output, blow hole on right side with ostomy bag with minimal green output  Extremities: WWP, normal strength, no edema appreciated        Left Heel no skin breakdown, no tenderness       Right heel no skin breakdown, no tenderness   Neuro: A/O x 3, CNs II-XII grossly intact, no focal deficits, normal motor/sensation  Pulses: palpable distal pulses    I&O's Summary    04 Sep 2023 07:01  -  05 Sep 2023 07:00  --------------------------------------------------------  IN: 3064.4 mL / OUT: 2730 mL / NET: 334.4 mL    05 Sep 2023 07:01  -  06 Sep 2023 06:12  --------------------------------------------------------  IN: 1840.6 mL / OUT: 2245 mL / NET: -404.4 mL        LABS:                        7.9    11.92 )-----------( 236      ( 06 Sep 2023 05:17 )             25.3     09-06    133<L>  |  95<L>  |  32<H>  ----------------------------<  101<H>  4.0   |  32<H>  |  2.17<H>    Ca    8.3<L>      06 Sep 2023 05:17  Phos  3.6     09-06  Mg     2.0     09-06    TPro  6.7  /  Alb  2.0<L>  /  TBili  3.0<H>  /  DBili  2.0<H>  /  AST  44<H>  /  ALT  31  /  AlkPhos  173<H>  09-05      Urinalysis Basic - ( 06 Sep 2023 05:17 )    Color: x / Appearance: x / SG: x / pH: x  Gluc: 101 mg/dL / Ketone: x  / Bili: x / Urobili: x   Blood: x / Protein: x / Nitrite: x   Leuk Esterase: x / RBC: x / WBC x   Sq Epi: x / Non Sq Epi: x / Bacteria: x      CAPILLARY BLOOD GLUCOSE      POCT Blood Glucose.: 101 mg/dL (06 Sep 2023 05:14)  POCT Blood Glucose.: 117 mg/dL (05 Sep 2023 23:07)  POCT Blood Glucose.: 108 mg/dL (05 Sep 2023 19:12)  POCT Blood Glucose.: 155 mg/dL (05 Sep 2023 10:55)    LIVER FUNCTIONS - ( 05 Sep 2023 05:31 )  Alb: 2.0 g/dL / Pro: 6.7 g/dL / ALK PHOS: 173 U/L / ALT: 31 U/L / AST: 44 U/L / GGT: x             RADIOLOGY & ADDITIONAL STUDIES:   Overnight events: No acute overnight events.     SUBJECTIVE: Patient seen at bedside. Patient resting in bed. He denies any current pain or nausea. Patient does report mild irritation on his right heel.       MEDICATIONS  (STANDING):  chlorhexidine 2% Cloths 1 Application(s) Topical <User Schedule>  cholestyramine Powder (Sugar-Free) 4 Gram(s) Oral two times a day  dextrose 5%. 1000 milliLiter(s) (50 mL/Hr) IV Continuous <Continuous>  dextrose 50% Injectable 25 Gram(s) IV Push once  dextrose 50% Injectable 25 Gram(s) IV Push once  diphenoxylate/atropine 2 Tablet(s) Oral every 6 hours  glucagon  Injectable 1 milliGRAM(s) IntraMuscular once  glucagon  Injectable 1 milliGRAM(s) IntraMuscular once  heparin   Injectable 5000 Unit(s) SubCutaneous every 8 hours  insulin lispro (ADMELOG) corrective regimen sliding scale   SubCutaneous every 6 hours  levothyroxine Injectable 40 MICROGram(s) IV Push <User Schedule>  loperamide 4 milliGRAM(s) Oral every 6 hours  metoprolol tartrate 25 milliGRAM(s) Oral every 12 hours  nystatin Powder 1 Application(s) Topical three times a day  octreotide  Injectable 100 MICROGram(s) IV Push every 8 hours  pantoprazole  Injectable 40 milliGRAM(s) IV Push daily  venlafaxine XR. 300 milliGRAM(s) Oral daily    MEDICATIONS  (PRN):  benzocaine/menthol Lozenge 2 Lozenge Oral every 4 hours PRN Sore Throat  dextrose Oral Gel 15 Gram(s) Oral once PRN Blood Glucose LESS THAN 70 milliGRAM(s)/deciliter  melatonin 5 milliGRAM(s) Oral at bedtime PRN Sleep      Vital Signs Last 24 Hrs  T(C): 36.4 (06 Sep 2023 01:16), Max: 36.8 (05 Sep 2023 21:43)  T(F): 97.6 (06 Sep 2023 01:16), Max: 98.2 (05 Sep 2023 21:43)  HR: 79 (06 Sep 2023 05:00) (78 - 85)  BP: 129/71 (06 Sep 2023 05:00) (98/52 - 157/69)  BP(mean): 92 (06 Sep 2023 05:00) (72 - 106)  RR: 21 (06 Sep 2023 05:00) (15 - 37)  SpO2: 96% (06 Sep 2023 05:00) (92% - 98%)    Parameters below as of 06 Sep 2023 05:00  Patient On (Oxygen Delivery Method): room air        Physical Exam:  General: NAD, resting comfortably in bed  ENT: mucus membranes are moist  Pulmonary: Nonlabored breathing, no respiratory distress, lungs clear bilaterally   Cardiovascular: NSR, normal rate and rhythm no murmurs   Abdominal: soft, vac in place with suction, estefania drain on left side with minimal dark output, blow hole on right side with ostomy bag with minimal green output  Extremities: WWP, normal strength, no edema appreciated        Left Heel no skin breakdown, no tenderness       Right heel no skin breakdown, no tenderness   Neuro: A/O x 3, CNs II-XII grossly intact, no focal deficits, normal motor/sensation  Skin: warm and perfused   Pulses: palpable distal pulses    I&O's Summary    04 Sep 2023 07:01  -  05 Sep 2023 07:00  --------------------------------------------------------  IN: 3064.4 mL / OUT: 2730 mL / NET: 334.4 mL    05 Sep 2023 07:01  -  06 Sep 2023 06:12  --------------------------------------------------------  IN: 1840.6 mL / OUT: 2245 mL / NET: -404.4 mL        LABS:                        7.9    11.92 )-----------( 236      ( 06 Sep 2023 05:17 )             25.3     09-06    133<L>  |  95<L>  |  32<H>  ----------------------------<  101<H>  4.0   |  32<H>  |  2.17<H>    Ca    8.3<L>      06 Sep 2023 05:17  Phos  3.6     09-06  Mg     2.0     09-06    TPro  6.7  /  Alb  2.0<L>  /  TBili  3.0<H>  /  DBili  2.0<H>  /  AST  44<H>  /  ALT  31  /  AlkPhos  173<H>  09-05      Urinalysis Basic - ( 06 Sep 2023 05:17 )    Color: x / Appearance: x / SG: x / pH: x  Gluc: 101 mg/dL / Ketone: x  / Bili: x / Urobili: x   Blood: x / Protein: x / Nitrite: x   Leuk Esterase: x / RBC: x / WBC x   Sq Epi: x / Non Sq Epi: x / Bacteria: x      CAPILLARY BLOOD GLUCOSE      POCT Blood Glucose.: 101 mg/dL (06 Sep 2023 05:14)  POCT Blood Glucose.: 117 mg/dL (05 Sep 2023 23:07)  POCT Blood Glucose.: 108 mg/dL (05 Sep 2023 19:12)  POCT Blood Glucose.: 155 mg/dL (05 Sep 2023 10:55)    LIVER FUNCTIONS - ( 05 Sep 2023 05:31 )  Alb: 2.0 g/dL / Pro: 6.7 g/dL / ALK PHOS: 173 U/L / ALT: 31 U/L / AST: 44 U/L / GGT: x             RADIOLOGY & ADDITIONAL STUDIES:

## 2023-09-07 NOTE — PROGRESS NOTE ADULT - ASSESSMENT

## 2023-09-07 NOTE — PROGRESS NOTE ADULT - SUBJECTIVE AND OBJECTIVE BOX
wbc increased  episode of desaturation  AVSS  renal parameters  plateau  outputs from fistuls down  UO 1600/24 hours  OOB  PT  CXR  Sips only  continue antidiarrheals

## 2023-09-07 NOTE — PROGRESS NOTE ADULT - SUBJECTIVE AND OBJECTIVE BOX
SUBJECTIVE:  Pt seen this AM at bedside. Pt endorses sleeping through the night. Of note patient required 2LNC but no complaint of CP or dyspnea at the time; now on RA sat 94%. Pt denies nausea/vomiting. +output in ileostomy bag.     MEDICATIONS  (STANDING):  chlorhexidine 2% Cloths 1 Application(s) Topical <User Schedule>  cholestyramine Powder (Sugar-Free) 4 Gram(s) Oral two times a day  dextrose 5%. 1000 milliLiter(s) (50 mL/Hr) IV Continuous <Continuous>  dextrose 50% Injectable 25 Gram(s) IV Push once  dextrose 50% Injectable 25 Gram(s) IV Push once  diphenoxylate/atropine 2 Tablet(s) Oral every 6 hours  glucagon  Injectable 1 milliGRAM(s) IntraMuscular once  glucagon  Injectable 1 milliGRAM(s) IntraMuscular once  heparin   Injectable 5000 Unit(s) SubCutaneous every 8 hours  insulin lispro (ADMELOG) corrective regimen sliding scale   SubCutaneous every 6 hours  levothyroxine Injectable 40 MICROGram(s) IV Push <User Schedule>  lipid, fat emulsion (Fish Oil and Plant Based) 20% Infusion 1 Gm/kG/Day (41.67 mL/Hr) IV Continuous <Continuous>  loperamide 4 milliGRAM(s) Oral every 6 hours  metoprolol tartrate 25 milliGRAM(s) Oral every 12 hours  nystatin Powder 1 Application(s) Topical three times a day  octreotide  Injectable 100 MICROGram(s) IV Push every 8 hours  pantoprazole  Injectable 40 milliGRAM(s) IV Push daily  Parenteral Nutrition - Adult 1 Each (104 mL/Hr) TPN Continuous <Continuous>  venlafaxine XR. 300 milliGRAM(s) Oral daily    MEDICATIONS  (PRN):  benzocaine/menthol Lozenge 2 Lozenge Oral every 4 hours PRN Sore Throat  dextrose Oral Gel 15 Gram(s) Oral once PRN Blood Glucose LESS THAN 70 milliGRAM(s)/deciliter  melatonin 5 milliGRAM(s) Oral at bedtime PRN Sleep      Vital Signs Last 24 Hrs  T(C): 36.6 (07 Sep 2023 05:00), Max: 37.1 (06 Sep 2023 09:00)  T(F): 97.9 (07 Sep 2023 05:00), Max: 98.7 (06 Sep 2023 09:00)  HR: 80 (07 Sep 2023 05:50) (76 - 96)  BP: 107/54 (07 Sep 2023 05:50) (107/54 - 135/63)  BP(mean): 75 (07 Sep 2023 05:50) (75 - 91)  RR: 18 (07 Sep 2023 03:50) (17 - 38)  SpO2: 93% (07 Sep 2023 05:50) (89% - 99%)    Parameters below as of 07 Sep 2023 03:50  Patient On (Oxygen Delivery Method): nasal cannula  O2 Flow (L/min): 2      Physical Exam:  General: NAD, resting comfortably in bed  Pulmonary: Nonlabored breathing, no respiratory distress  Cardiovascular: NSR  Abdominal: soft, NT/ND  Extremities: WWP, normal strength  Neuro: A/O x 3, CNs II-XII grossly intact, no focal deficits    I&O's Summary    06 Sep 2023 07:01  -  07 Sep 2023 07:00  --------------------------------------------------------  IN: 2191.4 mL / OUT: 2355 mL / NET: -163.6 mL        LABS:                        8.3    13.67 )-----------( 240      ( 07 Sep 2023 07:01 )             26.9     09-07    135  |  97  |  38<H>  ----------------------------<  107<H>  4.8   |  31  |  2.17<H>    Ca    8.4      07 Sep 2023 07:01  Phos  3.7     09-07  Mg     1.9     09-07        Urinalysis Basic - ( 07 Sep 2023 07:01 )    Color: x / Appearance: x / SG: x / pH: x  Gluc: 107 mg/dL / Ketone: x  / Bili: x / Urobili: x   Blood: x / Protein: x / Nitrite: x   Leuk Esterase: x / RBC: x / WBC x   Sq Epi: x / Non Sq Epi: x / Bacteria: x      CAPILLARY BLOOD GLUCOSE      POCT Blood Glucose.: 105 mg/dL (07 Sep 2023 06:27)  POCT Blood Glucose.: 100 mg/dL (06 Sep 2023 23:56)  POCT Blood Glucose.: 94 mg/dL (06 Sep 2023 17:46)  POCT Blood Glucose.: 121 mg/dL (06 Sep 2023 11:15)        RADIOLOGY & ADDITIONAL STUDIES:

## 2023-09-07 NOTE — PROGRESS NOTE ADULT - ASSESSMENT
The patient is a 78 year old male with a PMHx of Crohn's, AFib/Flutter s/p DCCVs on amiodarone, remote ileocectomy and open appendectomy. Admitted (6/23) for SBO s/p NGT decompression and s/p lap TRE converted to open TRE, SBR x 3, left in discontinuity with abthera vac on (6/27), RTOR for ileocolic resection, small bowel anastomosis, and abdominal wall closure on (6/28), c/b fluid collection s/p IR aspiration of perihepatic fluid on (7/3), c/b wound dehiscence s/p RTOR exlap, washout, ileocolic resection with end ileostomy, blow hole colostomy, red rubber from ileostomy to small bowel anastomosis; vicryl bridging mesh on (7/5) c/b EC fistula and prolonged TPN for nutritional support, and hyponatremia, high ostomy output, ELVI, now resolved.     Recommendations  NPO with sips/TPN  Continue with octreotide/lomotil/imodium  SQH/SCDs/OOBA/IS  Vac Change tomorrow  Monitor Cr  Monitor JOYCE output, ECF output  Rest of care per primary  Surgery Team 4C will continue to follow. Please page Team 4 with questions/clinical changes. [phone]

## 2023-09-08 NOTE — PROGRESS NOTE ADULT - ASSESSMENT
The patient is a 78 year old male with a PMHx of Crohn's, AFib/Flutter s/p DCCVs on amiodarone, remote ileocectomy and open appendectomy. Admitted (6/23) for SBO s/p NGT decompression and s/p lap TRE converted to open TRE, SBR x 3, left in discontinuity with abthera vac on (6/27), RTOR for ileocolic resection, small bowel anastomosis, and abdominal wall closure on (6/28), c/b fluid collection s/p IR aspiration of perihepatic fluid on (7/3), c/b wound dehiscence s/p RTOR exlap, washout, ileocolic resection with end ileostomy, blow hole colostomy, red rubber from ileostomy to small bowel anastomosis; vicryl bridging mesh on (7/5) c/b EC fistula and prolonged TPN for nutritional support, and hyponatremia, high ostomy output, ELVI, now resolved.     Recommendations  NPO with sips/TPN with adequate adjustment  Continue with octreotide/lomotil/imodium  SQH/SCDs/OOBA/IS  Vac Change TODAY  Recommend BID BMP with correction of any abnormality. Close monitoring of patient's fluid and electrolyte status given recent ELVI..   Monitor Cr  Monitor JOYCE output, ECF output  Rest of care per primary  Surgery Team 4C will continue to follow. Please page Team 4 with questions/clinical changes. [phone]

## 2023-09-08 NOTE — PROGRESS NOTE ADULT - SUBJECTIVE AND OBJECTIVE BOX
SUBJECTIVE:      MEDICATIONS  (STANDING):  chlorhexidine 2% Cloths 1 Application(s) Topical <User Schedule>  cholestyramine Powder (Sugar-Free) 4 Gram(s) Oral two times a day  diphenoxylate/atropine 2 Tablet(s) Oral every 6 hours  glucagon  Injectable 1 milliGRAM(s) IntraMuscular once  heparin   Injectable 5000 Unit(s) SubCutaneous every 8 hours  levothyroxine Injectable 40 MICROGram(s) IV Push <User Schedule>  lipid, fat emulsion (Fish Oil and Plant Based) 20% Infusion 1 Gm/kG/Day (41.67 mL/Hr) IV Continuous <Continuous>  loperamide 4 milliGRAM(s) Oral every 6 hours  metoprolol tartrate 25 milliGRAM(s) Oral every 12 hours  nystatin Powder 1 Application(s) Topical three times a day  octreotide  Injectable 100 MICROGram(s) IV Push every 8 hours  pantoprazole  Injectable 40 milliGRAM(s) IV Push daily  Parenteral Nutrition - Adult 1 Each (104 mL/Hr) TPN Continuous <Continuous>  venlafaxine XR. 300 milliGRAM(s) Oral daily    MEDICATIONS  (PRN):  benzocaine/menthol Lozenge 2 Lozenge Oral every 4 hours PRN Sore Throat  melatonin 5 milliGRAM(s) Oral at bedtime PRN Sleep      Vital Signs Last 24 Hrs  T(C): 36.7 (08 Sep 2023 05:02), Max: 36.9 (07 Sep 2023 13:48)  T(F): 98 (08 Sep 2023 05:02), Max: 98.4 (07 Sep 2023 13:48)  HR: 78 (08 Sep 2023 03:32) (74 - 88)  BP: 139/64 (08 Sep 2023 03:32) (108/55 - 144/67)  BP(mean): 92 (08 Sep 2023 03:32) (75 - 96)  RR: 17 (08 Sep 2023 03:32) (17 - 18)  SpO2: 94% (08 Sep 2023 03:32) (94% - 98%)    Parameters below as of 08 Sep 2023 03:32  Patient On (Oxygen Delivery Method): room air        Physical Exam:  General: NAD, resting comfortably in bed  Pulmonary: Nonlabored breathing, no respiratory distress  Cardiovascular: NSR  Abdominal: soft, NT/ND  Extremities: WWP, normal strength  Neuro: A/O x 3, CNs II-XII grossly intact, no focal deficits    I&O's Summary    07 Sep 2023 07:01  -  08 Sep 2023 07:00  --------------------------------------------------------  IN: 3175.8 mL / OUT: 2690 mL / NET: 485.8 mL    08 Sep 2023 07:01  -  08 Sep 2023 07:55  --------------------------------------------------------  IN: 104 mL / OUT: 0 mL / NET: 104 mL        LABS:                        8.6    12.80 )-----------( 243      ( 08 Sep 2023 07:20 )             27.9     09-07    131<L>  |  96  |  42<H>  ----------------------------<  86  4.4   |  29  |  2.03<H>    Ca    8.6      07 Sep 2023 22:54  Phos  3.7     09-07  Mg     1.9     09-07        Urinalysis Basic - ( 07 Sep 2023 22:54 )    Color: x / Appearance: x / SG: x / pH: x  Gluc: 86 mg/dL / Ketone: x  / Bili: x / Urobili: x   Blood: x / Protein: x / Nitrite: x   Leuk Esterase: x / RBC: x / WBC x   Sq Epi: x / Non Sq Epi: x / Bacteria: x      CAPILLARY BLOOD GLUCOSE      POCT Blood Glucose.: 72 mg/dL (08 Sep 2023 06:30)  POCT Blood Glucose.: 97 mg/dL (08 Sep 2023 03:12)  POCT Blood Glucose.: 80 mg/dL (07 Sep 2023 23:58)  POCT Blood Glucose.: 61 mg/dL (07 Sep 2023 22:56)  POCT Blood Glucose.: 111 mg/dL (07 Sep 2023 21:47)        RADIOLOGY & ADDITIONAL STUDIES:   SUBJECTIVE:  Pt seen at bedside this AM. Pt in good spirits and mentating appropriately. Pt tolerating sips of ensure/gatorade. Denies nausea/vomiting.     MEDICATIONS  (STANDING):  chlorhexidine 2% Cloths 1 Application(s) Topical <User Schedule>  cholestyramine Powder (Sugar-Free) 4 Gram(s) Oral two times a day  diphenoxylate/atropine 2 Tablet(s) Oral every 6 hours  glucagon  Injectable 1 milliGRAM(s) IntraMuscular once  heparin   Injectable 5000 Unit(s) SubCutaneous every 8 hours  levothyroxine Injectable 40 MICROGram(s) IV Push <User Schedule>  lipid, fat emulsion (Fish Oil and Plant Based) 20% Infusion 1 Gm/kG/Day (41.67 mL/Hr) IV Continuous <Continuous>  loperamide 4 milliGRAM(s) Oral every 6 hours  metoprolol tartrate 25 milliGRAM(s) Oral every 12 hours  nystatin Powder 1 Application(s) Topical three times a day  octreotide  Injectable 100 MICROGram(s) IV Push every 8 hours  pantoprazole  Injectable 40 milliGRAM(s) IV Push daily  Parenteral Nutrition - Adult 1 Each (104 mL/Hr) TPN Continuous <Continuous>  venlafaxine XR. 300 milliGRAM(s) Oral daily    MEDICATIONS  (PRN):  benzocaine/menthol Lozenge 2 Lozenge Oral every 4 hours PRN Sore Throat  melatonin 5 milliGRAM(s) Oral at bedtime PRN Sleep      Vital Signs Last 24 Hrs  T(C): 36.7 (08 Sep 2023 05:02), Max: 36.9 (07 Sep 2023 13:48)  T(F): 98 (08 Sep 2023 05:02), Max: 98.4 (07 Sep 2023 13:48)  HR: 78 (08 Sep 2023 03:32) (74 - 88)  BP: 139/64 (08 Sep 2023 03:32) (108/55 - 144/67)  BP(mean): 92 (08 Sep 2023 03:32) (75 - 96)  RR: 17 (08 Sep 2023 03:32) (17 - 18)  SpO2: 94% (08 Sep 2023 03:32) (94% - 98%)    Parameters below as of 08 Sep 2023 03:32  Patient On (Oxygen Delivery Method): room air        Physical Exam:  General: NAD, resting comfortably in bed  Pulmonary: Nonlabored breathing, no respiratory distress  Cardiovascular: NSR  Abdominal: soft, vac in place, nontender   Extremities: WWP, normal strength  Neuro: A/O x 3, CNs II-XII grossly intact, no focal deficits    I&O's Summary    07 Sep 2023 07:01  -  08 Sep 2023 07:00  --------------------------------------------------------  IN: 3175.8 mL / OUT: 2690 mL / NET: 485.8 mL    08 Sep 2023 07:01  -  08 Sep 2023 07:55  --------------------------------------------------------  IN: 104 mL / OUT: 0 mL / NET: 104 mL        LABS:                        8.6    12.80 )-----------( 243      ( 08 Sep 2023 07:20 )             27.9     09-07    131<L>  |  96  |  42<H>  ----------------------------<  86  4.4   |  29  |  2.03<H>    Ca    8.6      07 Sep 2023 22:54  Phos  3.7     09-07  Mg     1.9     09-07        Urinalysis Basic - ( 07 Sep 2023 22:54 )    Color: x / Appearance: x / SG: x / pH: x  Gluc: 86 mg/dL / Ketone: x  / Bili: x / Urobili: x   Blood: x / Protein: x / Nitrite: x   Leuk Esterase: x / RBC: x / WBC x   Sq Epi: x / Non Sq Epi: x / Bacteria: x      CAPILLARY BLOOD GLUCOSE      POCT Blood Glucose.: 72 mg/dL (08 Sep 2023 06:30)  POCT Blood Glucose.: 97 mg/dL (08 Sep 2023 03:12)  POCT Blood Glucose.: 80 mg/dL (07 Sep 2023 23:58)  POCT Blood Glucose.: 61 mg/dL (07 Sep 2023 22:56)  POCT Blood Glucose.: 111 mg/dL (07 Sep 2023 21:47)        RADIOLOGY & ADDITIONAL STUDIES:

## 2023-09-08 NOTE — PROGRESS NOTE ADULT - ASSESSMENT

## 2023-09-08 NOTE — PROGRESS NOTE ADULT - SUBJECTIVE AND OBJECTIVE BOX
SUBJECTIVE:   Pt seen and examined at bedside this am by surgery team. Patient is lying comfortably in bed with no complaints. Denies pain, fever, nausea, vomiting, chest pain, and shortness of breath. Small leaks from wound vac or ostomy appliances. Awaiting wound vac change today.     Tolerating clear liquid diet (gatorade)   Blood tinged ECF drainage resolved. Bilious now.    Events:     9/7: BMP showed potassium of 5.5, sodium of 130, ECG slight peaking T waves anterior leads. Calcium gluconate+insulin+D50 given. POC Glucose 61, patient recheck 80. 10pm BMP K 4.4. Mild hyponatremia(130, 131). Ulytes consistent with SIADH.     MEDICATIONS  (STANDING):  chlorhexidine 2% Cloths 1 Application(s) Topical <User Schedule>  cholestyramine Powder (Sugar-Free) 4 Gram(s) Oral two times a day  diphenoxylate/atropine 2 Tablet(s) Oral every 6 hours  glucagon  Injectable 1 milliGRAM(s) IntraMuscular once  heparin   Injectable 5000 Unit(s) SubCutaneous every 8 hours  levothyroxine Injectable 40 MICROGram(s) IV Push <User Schedule>  lipid, fat emulsion (Fish Oil and Plant Based) 20% Infusion 1 Gm/kG/Day (41.67 mL/Hr) IV Continuous <Continuous>  loperamide 4 milliGRAM(s) Oral every 6 hours  metoprolol tartrate 25 milliGRAM(s) Oral every 12 hours  nystatin Powder 1 Application(s) Topical three times a day  octreotide  Injectable 100 MICROGram(s) IV Push every 8 hours  pantoprazole  Injectable 40 milliGRAM(s) IV Push daily  Parenteral Nutrition - Adult 1 Each (104 mL/Hr) TPN Continuous <Continuous>  venlafaxine XR. 300 milliGRAM(s) Oral daily    MEDICATIONS  (PRN):  benzocaine/menthol Lozenge 2 Lozenge Oral every 4 hours PRN Sore Throat  melatonin 5 milliGRAM(s) Oral at bedtime PRN Sleep      Vital Signs Last 24 Hrs  T(C): 36.7 (08 Sep 2023 05:02), Max: 36.9 (07 Sep 2023 13:48)  T(F): 98 (08 Sep 2023 05:02), Max: 98.4 (07 Sep 2023 13:48)  HR: 78 (08 Sep 2023 03:32) (74 - 88)  BP: 139/64 (08 Sep 2023 03:32) (108/55 - 144/67)  BP(mean): 92 (08 Sep 2023 03:32) (75 - 96)  RR: 17 (08 Sep 2023 03:32) (17 - 18)  SpO2: 94% (08 Sep 2023 03:32) (94% - 98%)    Parameters below as of 08 Sep 2023 03:32  Patient On (Oxygen Delivery Method): room air    Physical Exam  General: Patient is doing well and lying in bed comfortably  Constitutional: alert and oriented   Pulm: Nonlabored breathing, no respiratory distress  CV: Regular rate and rhythm, normal sinus rhythm  Abd:  soft, nontender, nondistended. No rebound, no guarding. Midline wound covered with wound vac, appropriate seal serous output. LUQ ECF with blood tinged output, likely 2/2 irritation from catheter. JOYCE drain with bilious output. End ileostomy, ppp with liquid output, mostly serous.   Extremities: warm, well perfused, no edema    I&O's Summary    07 Sep 2023 07:01  -  08 Sep 2023 07:00  --------------------------------------------------------  IN: 3175.8 mL / OUT: 2690 mL / NET: 485.8 mL    08 Sep 2023 07:01  -  08 Sep 2023 07:54  --------------------------------------------------------  IN: 104 mL / OUT: 0 mL / NET: 104 mL        LABS:                        8.6    12.80 )-----------( 243      ( 08 Sep 2023 07:20 )             27.9     09-07    131<L>  |  96  |  42<H>  ----------------------------<  86  4.4   |  29  |  2.03<H>    Ca    8.6      07 Sep 2023 22:54  Phos  3.7     09-07  Mg     1.9     09-07        Urinalysis Basic - ( 07 Sep 2023 22:54 )    Color: x / Appearance: x / SG: x / pH: x  Gluc: 86 mg/dL / Ketone: x  / Bili: x / Urobili: x   Blood: x / Protein: x / Nitrite: x   Leuk Esterase: x / RBC: x / WBC x   Sq Epi: x / Non Sq Epi: x / Bacteria: x      CAPILLARY BLOOD GLUCOSE      POCT Blood Glucose.: 72 mg/dL (08 Sep 2023 06:30)  POCT Blood Glucose.: 97 mg/dL (08 Sep 2023 03:12)  POCT Blood Glucose.: 80 mg/dL (07 Sep 2023 23:58)  POCT Blood Glucose.: 61 mg/dL (07 Sep 2023 22:56)  POCT Blood Glucose.: 111 mg/dL (07 Sep 2023 21:47)        RADIOLOGY & ADDITIONAL STUDIES:

## 2023-09-09 NOTE — PROGRESS NOTE ADULT - ASSESSMENT
78 M w/ Crohn's, AFib/Flutter s/p DCCVs on amiodarone, remote ileocectomy and open appendectomy. Admitted (6/23) for SBO vs Crohns flare, s/p NGT decompression and s/p lap TRE converted to open TRE, SBR x 3, left in discontinuity with abthera vac on (6/27), RTOR for ileocolic resection, small bowel anastomosis, and abdominal wall closure on (6/28), c/b fluid collection s/p IR aspiration of perihepatic fluid on (7/3), c/b wound dehiscence s/p RTOR exlap, washout, ileocolic resection with end ileostomy, blow hole colostomy, red rubber from ileostomy to small bowel anastomosis; vicryl bridging mesh on (7/5). hospital course include interval development of EC fistulas and prolonged TPN for nutritional support, brief episode of AMS 8/23 (resolved within 1day), back to SICU 9/1 for hyponatremia (Na 122), oliguric ELVI, concern for hypovolemia.      NPO w/ sips of gatorade + high-calorie/dense ensure    monitor I/O closely   Octreotide/Lomotil/Imodium  pain/nausea PRN  SQH/SCD

## 2023-09-09 NOTE — CONSULT NOTE ADULT - ASSESSMENT
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 Patient with excellent insight--he notes that he had visual hallucinations today that matches those from when he had hepatic encephalopathy, exam notable for occasional paraphasic errors and word finding, intention tremor (new) +/- slight asterixis. symetric reflexes, possible limited upward gaze and inconsistent rigidity. CTH 8/23 with minimal atrophy and appropriately-sized ventricles. Labs notable for transaminitis and BUN 30-40s this past week.     Impression: AMS with near-return to baseline, tremor and asterixis, and hallucinations that occurred during prior hepatic encephalopathy support a recurrence of hepatic encephalopathy. He also is at risk for delirium i/s/o prolonged hospitalization, abdominal pain, as well as TME i/s/o electrolyte abnormalities and malnutrition.    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 Patient with excellent insight--he notes that he had visual hallucinations today that matches those from when he had hepatic encephalopathy, exam notable for occasional paraphasic errors and word finding, intention tremor (new) +/- slight asterixis. symmetric reflexes, possible limited upward gaze and inconsistent rigidity. CTH 8/23 with minimal atrophy and appropriately-sized ventricles. Labs notable for transaminitis and BUN 30-40s this past week.     Impression: AMS with near-return to baseline today, tremor and asterixis, and hallucinations that occurred during prior hepatic encephalopathy support a recurrence of hepatic encephalopathy. He also is at risk for delirium i/s/o prolonged hospitalization, abdominal pain, as well as TME i/s/o electrolyte abnormalities and malnutrition.    Recommendations:   - Order labs: ammonia B12 thiamine folate TSH  - Delirium precautions: minimize use of anticholinergic, antihistaminic, and benzodiazepine medications  - Continue melatonin to promote sleep-wake cycle  - Neurology will follow tomorrow    Plan discussed with attending

## 2023-09-09 NOTE — PROGRESS NOTE ADULT - SUBJECTIVE AND OBJECTIVE BOX
NEPHROLOGY PROGRESS NOTE    Subjective: Patient seen and examined at bedside. Overall feels okay, main complaint is the abdominal issues and not eating or drinking much. sCr has been stable, potassium acceptable, discussed in detail    [OBJECTIVE]:    Vital Signs:  T(F): , Max: 98.2 (09-09-23 @ 05:15)  HR:  (70 - 74)  BP:  (104/52 - 135/62)  BP(mean):  (70 - 94)  ABP: --  ABP(mean): --  RR:  (16 - 18)  SpO2:  (93% - 99%)  CVP(mm Hg): --  CVP(cm H2O): --      09-08 @ 07:01  -  09-09 @ 07:00  --------------------------------------------------------  IN: 2975 mL / OUT: 2730 mL / NET: 245 mL    09-09 @ 07:01  -  09-09 @ 14:29  --------------------------------------------------------  IN: 978 mL / OUT: 970 mL / NET: 8 mL      CAPILLARY BLOOD GLUCOSE      POCT Blood Glucose.: 104 mg/dL (08 Sep 2023 21:15)      .  VITAL SIGNS:  T(F): 97.7 (09-09-23 @ 13:00), Max: 98.2 (09-09-23 @ 05:15)  HR: 70 (09-09-23 @ 12:41) (70 - 74)  BP: 135/62 (09-09-23 @ 12:41) (104/52 - 135/62)  BP(mean): 89 (09-09-23 @ 12:41) (70 - 94)  RR: 16 (09-09-23 @ 12:41) (16 - 18)  SpO2: 93% (09-09-23 @ 12:41) (93% - 99%)    PHYSICAL EXAM:  Constitutional: Resting comfortably in bed; NAD  HEENT:  EOMI, anicteric sclera; dry mm, voice strained  Respiratory: CTA B/L; no W/R/R, no accessory muscle use  Cardiac: +S1/S2; RRR; no M/R/G  Gastrointestinal: soft,+ostomy and suction vac  Extremities: WWP, no clubbing or cyanosis; no peripheral edema  Dermatologic: skin warm, no rashes noted  Access: None    Medications:  MEDICATIONS  (STANDING):  chlorhexidine 2% Cloths 1 Application(s) Topical <User Schedule>  cholestyramine Powder (Sugar-Free) 4 Gram(s) Oral two times a day  diphenoxylate/atropine 2 Tablet(s) Oral every 6 hours  glucagon  Injectable 1 milliGRAM(s) IntraMuscular once  heparin   Injectable 5000 Unit(s) SubCutaneous every 8 hours  levothyroxine Injectable 40 MICROGram(s) IV Push <User Schedule>  lipid, fat emulsion (Fish Oil and Plant Based) 20% Infusion 0.99 Gm/kG/Day (41.67 mL/Hr) IV Continuous <Continuous>  loperamide 4 milliGRAM(s) Oral every 6 hours  metoprolol tartrate 25 milliGRAM(s) Oral every 12 hours  nystatin Powder 1 Application(s) Topical three times a day  octreotide  Injectable 100 MICROGram(s) IV Push every 8 hours  pantoprazole  Injectable 40 milliGRAM(s) IV Push daily  Parenteral Nutrition - Adult 1 Each (104 mL/Hr) TPN Continuous <Continuous>  Parenteral Nutrition - Adult 1 Each (104 mL/Hr) TPN Continuous <Continuous>  venlafaxine XR. 300 milliGRAM(s) Oral daily    MEDICATIONS  (PRN):  benzocaine/menthol Lozenge 2 Lozenge Oral every 4 hours PRN Sore Throat  melatonin 5 milliGRAM(s) Oral at bedtime PRN Sleep      Allergies:  Allergies    penicillin (Angioedema)    Intolerances        Labs:                        8.2    12.40 )-----------( 260      ( 09 Sep 2023 06:19 )             26.7     09-09    135  |  99  |  36<H>  ----------------------------<  107<H>  5.0   |  29  |  1.97<H>    Ca    8.9      09 Sep 2023 06:19  Phos  3.9     09-09  Mg     2.0     09-09    TPro  7.6  /  Alb  2.1<L>  /  TBili  3.2<H>  /  DBili  x   /  AST  52<H>  /  ALT  38  /  AlkPhos  156<H>  09-09      Urinalysis Basic - ( 09 Sep 2023 06:19 )    Color: x / Appearance: x / SG: x / pH: x  Gluc: 107 mg/dL / Ketone: x  / Bili: x / Urobili: x   Blood: x / Protein: x / Nitrite: x   Leuk Esterase: x / RBC: x / WBC x   Sq Epi: x / Non Sq Epi: x / Bacteria: x        Radiology and other tests:  Creatinine: 1.97 mg/dL (09-09-23 @ 06:19)  Creatinine: 2.05 mg/dL (09-08-23 @ 23:15)  Creatinine: 1.95 mg/dL (09-08-23 @ 18:24)  Creatinine: 1.97 mg/dL (09-08-23 @ 07:20)  Creatinine: 2.03 mg/dL (09-07-23 @ 22:54)  Creatinine: 2.03 mg/dL (09-07-23 @ 18:55)  Creatinine: 2.17 mg/dL (09-07-23 @ 07:01)  Creatinine: 2.17 mg/dL (09-06-23 @ 05:17)  Creatinine: 2.52 mg/dL (09-05-23 @ 05:31)  Creatinine: 3.13 mg/dL (09-04-23 @ 06:02)

## 2023-09-09 NOTE — CONSULT NOTE ADULT - SUBJECTIVE AND OBJECTIVE BOX
NEUROLOGY CONSULT    Additional history:     HPI:  78M w/ PMHx of Crohn's, HTN, HLD, AFib/Flutter s/p multiple DCCV (on Amio), Gout, PSHx R IHR, ileocecectomy, open appy presents for abdominal pain since last night. Pt states his pain is constant, severe pain diffusely. He states he has gotten this pain previously, though not as intensely that he has managed at home. Last BM and flatus was today. Denies n/v. Denies f/c. Denies cp/sob. Denies urinary complaints. Denies PO intake today. Pt was sent for a CT scan as an outpatient that showed distended loops of bowel with a possible transition point in the RLQ w/ fecalization of bowel contents.    In the ED, pt was afebrile, nontachycardic, hypertensive, and satting well on RA. Labs significant for WBC 11.77. Hgb 13.0. LA 1.4.    PMHx: HTN, Atrial fibrillation s/p multiple DCCV, Crohn's disease, HLD, Hypothyroidism  PSHx: Ileocecectomy 30 years ago, open appy, H/O knee surgery, History of cataract surgery  Family Hx: Denies family hx of IBS, Crohn's, UC, or colon cancer.  Last Cscope: 6 months ago wnl  Last EGD: 6 months ago wnl  All: penicillin (Angioedema)    Meds: allopurinol 300 mg oral tablet: 1 tab(s) orally once a day  amiodarone 200 mg oral tablet: 1 orally once a day  metoprolol tartrate 50 mg oral tablet: 1 orally 2 times a day  rosuvastatin 5 mg oral tablet: 1 tab(s) orally once a day  Synthroid 50 mcg (0.05 mg) oral tablet: 1 tab(s) orally once a day  venlafaxine 150 mg oral tablet, extended release: 1 tab(s) orally 2 times a day      Vitals past 24h:  T(F): 97.6 (13:29), Max: 97.6 (13:29)  HR: 98 (13:29) (98 - 98)  BP: 194/72 (13:29) (194/72 - 194/72)  RR: 20 (13:29) (20 - 20)  SpO2: 98% (13:29) (98% - 98%)   (2023 16:52)     MEDICATIONS  Home Medications:  allopurinol 300 mg oral tablet: 1 tab(s) orally once a day (2023 16:39)  amiodarone 200 mg oral tablet: 1 orally once a day (2023 16:39)  metoprolol tartrate 50 mg oral tablet: 1 orally 2 times a day (2023 16:39)  rosuvastatin 5 mg oral tablet: 1 tab(s) orally once a day (2023 16:39)  venlafaxine 150 mg oral tablet, extended release: 1 tab(s) orally 2 times a day (2023 16:39)    MEDICATIONS  (STANDING):  chlorhexidine 2% Cloths 1 Application(s) Topical <User Schedule>  cholestyramine Powder (Sugar-Free) 4 Gram(s) Oral two times a day  diphenoxylate/atropine 2 Tablet(s) Oral every 6 hours  glucagon  Injectable 1 milliGRAM(s) IntraMuscular once  heparin   Injectable 5000 Unit(s) SubCutaneous every 8 hours  levothyroxine Injectable 40 MICROGram(s) IV Push <User Schedule>  lipid, fat emulsion (Fish Oil and Plant Based) 20% Infusion 0.99 Gm/kG/Day (41.67 mL/Hr) IV Continuous <Continuous>  loperamide 4 milliGRAM(s) Oral every 6 hours  metoprolol tartrate 25 milliGRAM(s) Oral every 12 hours  nystatin Powder 1 Application(s) Topical three times a day  octreotide  Injectable 100 MICROGram(s) IV Push every 8 hours  pantoprazole  Injectable 40 milliGRAM(s) IV Push daily  Parenteral Nutrition - Adult 1 Each (104 mL/Hr) TPN Continuous <Continuous>  Parenteral Nutrition - Adult 1 Each (104 mL/Hr) TPN Continuous <Continuous>  venlafaxine XR. 300 milliGRAM(s) Oral daily    MEDICATIONS  (PRN):  benzocaine/menthol Lozenge 2 Lozenge Oral every 4 hours PRN Sore Throat  melatonin 5 milliGRAM(s) Oral at bedtime PRN Sleep      FAMILY HISTORY:    SOCIAL HISTORY: negative for tobacco, alcohol, or ilicit drug use.    Allergies    penicillin (Angioedema)    Intolerances        GEN: NAD, pleasant, cooperative    NEURO:   MENTAL STATUS: AAOx3  LANG/SPEECH: Fluent, intact naming, repetition & comprehension  CRANIAL NERVES:  II: Pupils equal and reactive, no RAPD, normal visual field and fundus  III, IV, VI: EOM intact, no gaze preference or deviation  V: normal  VII: no facial asymmetry  VIII: normal hearing to speech  MOTOR: 5/5 in both upper and lower extremities  REFLEXES: 2/4 throughout, bilateral flexor plantars  SENSORY: Normal to touch, temperature & pin prick in all extremiteis  COORD: Normal finger to nose and heel to shin, no tremor, no dysmetria  Gait:   NIHSS: **** ASPECT Score: ***** ICH Score: ****** (GCS)    LABS:                        8.3    12.60 )-----------( 275      ( 09 Sep 2023 16:10 )             25.7         135  |  99  |  36<H>  ----------------------------<  107<H>  5.0   |  29  |  1.97<H>    Ca    8.9      09 Sep 2023 06:19  Phos  3.9       Mg     2.0         TPro  7.6  /  Alb  2.1<L>  /  TBili  3.2<H>  /  DBili  x   /  AST  52<H>  /  ALT  38  /  AlkPhos  156<H>      Hemoglobin A1C:   Vitamin B12     CAPILLARY BLOOD GLUCOSE      POCT Blood Glucose.: 104 mg/dL (08 Sep 2023 21:15)      Urinalysis Basic - ( 09 Sep 2023 16:13 )    Color: Yellow / Appearance: Clear / S.010 / pH: x  Gluc: x / Ketone: NEGATIVE  / Bili: Negative / Urobili: 0.2 E.U./dL   Blood: x / Protein: Trace mg/dL / Nitrite: NEGATIVE   Leuk Esterase: NEGATIVE / RBC: x / WBC x   Sq Epi: x / Non Sq Epi: x / Bacteria: x            Microbiology:    Urinalysis with Rflx Culture (collected 09 Sep 2023 16:13)        RADIOLOGY, EKG AND ADDITIONAL TESTS: Reviewed.           NEUROLOGY CONSULT    Additional history: Endorsed visual hallucinations this morning of faces coming out of the wall, word finding difficulties. He notes he also saw these faces when he had the hepatic encephalopathy. Denied personal or family history of neurologic conditions.    HPI:  78M w/ PMHx of Crohn's, HTN, HLD, AFib/Flutter s/p multiple DCCV (on Amio), Gout, PSHx R IHR, ileocecectomy, open appy presents for abdominal pain since last night. Pt states his pain is constant, severe pain diffusely. He states he has gotten this pain previously, though not as intensely that he has managed at home. Last BM and flatus was today. Denies n/v. Denies f/c. Denies cp/sob. Denies urinary complaints. Denies PO intake today. Pt was sent for a CT scan as an outpatient that showed distended loops of bowel with a possible transition point in the RLQ w/ fecalization of bowel contents.    In the ED, pt was afebrile, nontachycardic, hypertensive, and satting well on RA. Labs significant for WBC 11.77. Hgb 13.0. LA 1.4.    PMHx: HTN, Atrial fibrillation s/p multiple DCCV, Crohn's disease, HLD, Hypothyroidism  PSHx: Ileocecectomy 30 years ago, open appy, H/O knee surgery, History of cataract surgery  Family Hx: Denies family hx of IBS, Crohn's, UC, or colon cancer.  Last Cscope: 6 months ago wnl  Last EGD: 6 months ago wnl  All: penicillin (Angioedema)    Meds: allopurinol 300 mg oral tablet: 1 tab(s) orally once a day  amiodarone 200 mg oral tablet: 1 orally once a day  metoprolol tartrate 50 mg oral tablet: 1 orally 2 times a day  rosuvastatin 5 mg oral tablet: 1 tab(s) orally once a day  Synthroid 50 mcg (0.05 mg) oral tablet: 1 tab(s) orally once a day  venlafaxine 150 mg oral tablet, extended release: 1 tab(s) orally 2 times a day      Vitals past 24h:  T(F): 97.6 (13:29), Max: 97.6 (13:29)  HR: 98 (13:29) (98 - 98)  BP: 194/72 (13:29) (194/72 - 194/72)  RR: 20 (13:29) (20 - 20)  SpO2: 98% (13:29) (98% - 98%)   (2023 16:52)     MEDICATIONS  Home Medications:  allopurinol 300 mg oral tablet: 1 tab(s) orally once a day (2023 16:39)  amiodarone 200 mg oral tablet: 1 orally once a day (2023 16:39)  metoprolol tartrate 50 mg oral tablet: 1 orally 2 times a day (2023 16:39)  rosuvastatin 5 mg oral tablet: 1 tab(s) orally once a day (2023 16:39)  venlafaxine 150 mg oral tablet, extended release: 1 tab(s) orally 2 times a day (2023 16:39)    MEDICATIONS  (STANDING):  chlorhexidine 2% Cloths 1 Application(s) Topical <User Schedule>  cholestyramine Powder (Sugar-Free) 4 Gram(s) Oral two times a day  diphenoxylate/atropine 2 Tablet(s) Oral every 6 hours  glucagon  Injectable 1 milliGRAM(s) IntraMuscular once  heparin   Injectable 5000 Unit(s) SubCutaneous every 8 hours  levothyroxine Injectable 40 MICROGram(s) IV Push <User Schedule>  lipid, fat emulsion (Fish Oil and Plant Based) 20% Infusion 0.99 Gm/kG/Day (41.67 mL/Hr) IV Continuous <Continuous>  loperamide 4 milliGRAM(s) Oral every 6 hours  metoprolol tartrate 25 milliGRAM(s) Oral every 12 hours  nystatin Powder 1 Application(s) Topical three times a day  octreotide  Injectable 100 MICROGram(s) IV Push every 8 hours  pantoprazole  Injectable 40 milliGRAM(s) IV Push daily  Parenteral Nutrition - Adult 1 Each (104 mL/Hr) TPN Continuous <Continuous>  Parenteral Nutrition - Adult 1 Each (104 mL/Hr) TPN Continuous <Continuous>  venlafaxine XR. 300 milliGRAM(s) Oral daily    MEDICATIONS  (PRN):  benzocaine/menthol Lozenge 2 Lozenge Oral every 4 hours PRN Sore Throat  melatonin 5 milliGRAM(s) Oral at bedtime PRN Sleep      FAMILY HISTORY:    SOCIAL HISTORY: negative for tobacco, alcohol, or ilicit drug use.    Allergies    penicillin (Angioedema)    Intolerances      NEURO EXAM  GEN: NAD, pleasant, cooperative   MENTAL STATUS: AAOx3  LANG/SPEECH: Fluent, intact naming, repetition & comprehension. Occasional paraphasic errors and word-finding difficulties  CRANIAL NERVES:  II: Pupils equal and reactive, no RAPD, normal visual field  III, IV, VI:  Slight limited upward gaze otherwise EOM intact, no gaze preference or deviation. Two beats of nystagmus on lateral extremes.  V: normal  VII: no facial asymmetry  VIII: normal hearing to speech  MOTOR: 5/5 in both upper and lower extremities. Inconsistent rigidity  REFLEXES: 2+ biceps mute patellar (knee surgeries), 2+ ankle. mute plantar  SENSORY: Normal to touch and vibration  COORD: Intention tremor. Mild asterixis  Gait: Deferred    LABS:                        8.3    12.60 )-----------( 275      ( 09 Sep 2023 16:10 )             25.7         135  |  99  |  36<H>  ----------------------------<  107<H>  5.0   |  29  |  1.97<H>    Ca    8.9      09 Sep 2023 06:19  Phos  3.9       Mg     2.0         TPro  7.6  /  Alb  2.1<L>  /  TBili  3.2<H>  /  DBili  x   /  AST  52<H>  /  ALT  38  /  AlkPhos  156<H>      Hemoglobin A1C:   Vitamin B12     CAPILLARY BLOOD GLUCOSE      POCT Blood Glucose.: 104 mg/dL (08 Sep 2023 21:15)      Urinalysis Basic - ( 09 Sep 2023 16:13 )    Color: Yellow / Appearance: Clear / S.010 / pH: x  Gluc: x / Ketone: NEGATIVE  / Bili: Negative / Urobili: 0.2 E.U./dL   Blood: x / Protein: Trace mg/dL / Nitrite: NEGATIVE   Leuk Esterase: NEGATIVE / RBC: x / WBC x   Sq Epi: x / Non Sq Epi: x / Bacteria: x            Microbiology:    Urinalysis with Rflx Culture (collected 09 Sep 2023 16:13)        RADIOLOGY, EKG AND ADDITIONAL TESTS: Reviewed.    < from: CT Head No Cont (08.23.23 @ 20:40) >  FINDINGS: The CT examination demonstrates the ventricles, cisternal   spaces, and cortical sulci to be within normal limits. There is no   midline shift or extra axial collections. The gray white differentiation   appears within normal limits. There is no intracranial hemorrhage or   acute transcortical infarct. The bony windows demonstrates no fractures.   The visualized paranasal sinuses are within normal limits. The mastoid   air cells are well aerated.    IMPRESSION: No intracranial hemorrhage or acute transcorticalinfarct.    --- End of Report ---    < end of copied text >

## 2023-09-09 NOTE — PROVIDER CONTACT NOTE (OTHER) - REASON
Patient Education        Atrial Fibrillation: Care Instructions  Your Care Instructions    Atrial fibrillation is an irregular and often fast heartbeat. Treating this condition is important for several reasons. It can cause blood clots, which can travel from your heart to your brain and cause a stroke. If you have a fast heartbeat, you may feel lightheaded, dizzy, and weak. An irregular heartbeat can also increase your risk for heart failure. Atrial fibrillation is often the result of another heart condition, such as high blood pressure or coronary artery disease. Making changes to improve your heart condition will help you stay healthy and active. Follow-up care is a key part of your treatment and safety. Be sure to make and go to all appointments, and call your doctor if you are having problems. It's also a good idea to know your test results and keep a list of the medicines you take. How can you care for yourself at home? Medicines    · Take your medicines exactly as prescribed. Call your doctor if you think you are having a problem with your medicine. You will get more details on the specific medicines your doctor prescribes.     · If your doctor has given you a blood thinner to prevent a stroke, be sure you get instructions about how to take your medicine safely. Blood thinners can cause serious bleeding problems.     · Do not take any vitamins, over-the-counter drugs, or herbal products without talking to your doctor first.    Lifestyle changes    · Do not smoke. Smoking can increase your chance of a stroke and heart attack. If you need help quitting, talk to your doctor about stop-smoking programs and medicines. These can increase your chances of quitting for good.     · Eat a heart-healthy diet.     · Stay at a healthy weight. Lose weight if you need to.     · Limit alcohol to 2 drinks a day for men and 1 drink a day for women. Too much alcohol can cause health problems.     · Avoid colds and flu.  Get a pneumococcal vaccine shot. If you have had one before, ask your doctor whether you need another dose. Get a flu shot every year. If you must be around people with colds or flu, wash your hands often. Activity    · If your doctor recommends it, get more exercise. Walking is a good choice. Bit by bit, increase the amount you walk every day. Try for at least 30 minutes on most days of the week. You also may want to swim, bike, or do other activities. Your doctor may suggest that you join a cardiac rehabilitation program so that you can have help increasing your physical activity safely.     · Start light exercise if your doctor says it is okay. Even a small amount will help you get stronger, have more energy, and manage stress. Walking is an easy way to get exercise. Start out by walking a little more than you did in the hospital. Gradually increase the amount you walk.     · When you exercise, watch for signs that your heart is working too hard. You are pushing too hard if you cannot talk while you are exercising. If you become short of breath or dizzy or have chest pain, sit down and rest immediately.     · Check your pulse regularly. Place two fingers on the artery at the palm side of your wrist, in line with your thumb. If your heartbeat seems uneven or fast, talk to your doctor. When should you call for help? Call 911 anytime you think you may need emergency care. For example, call if:    · You have symptoms of a heart attack. These may include:  ? Chest pain or pressure, or a strange feeling in the chest.  ? Sweating. ? Shortness of breath. ? Nausea or vomiting. ? Pain, pressure, or a strange feeling in the back, neck, jaw, or upper belly or in one or both shoulders or arms. ? Lightheadedness or sudden weakness. ? A fast or irregular heartbeat. After you call 911, the  may tell you to chew 1 adult-strength or 2 to 4 low-dose aspirin. Wait for an ambulance.  Do not try to drive yourself.     · You have symptoms of a stroke. These may include:  ? Sudden numbness, tingling, weakness, or loss of movement in your face, arm, or leg, especially on only one side of your body. ? Sudden vision changes. ? Sudden trouble speaking. ? Sudden confusion or trouble understanding simple statements. ? Sudden problems with walking or balance. ? A sudden, severe headache that is different from past headaches.     · You passed out (lost consciousness).    Call your doctor now or seek immediate medical care if:    · You have new or increased shortness of breath.     · You feel dizzy or lightheaded, or you feel like you may faint.     · Your heart rate becomes irregular.     · You can feel your heart flutter in your chest or skip heartbeats. Tell your doctor if these symptoms are new or worse.    Watch closely for changes in your health, and be sure to contact your doctor if you have any problems. Where can you learn more? Go to http://trevor-darrick.info/. Enter U020 in the search box to learn more about \"Atrial Fibrillation: Care Instructions. \"  Current as of: April 9, 2019  Content Version: 12.2  © 4281-0653 [employer]. Care instructions adapted under license by [employer] (which disclaims liability or warranty for this information). If you have questions about a medical condition or this instruction, always ask your healthcare professional. [address] any warranty or liability for your use of this information. PT is showing signs of AMS. He is able to answer all questions but the conversation may sway to an unrelated topic or comment.

## 2023-09-09 NOTE — PROGRESS NOTE ADULT - ASSESSMENT
Patient is a 79 yo M with prolonged hospital course, admited for SBO and CD flare s/p multiple GI surgeries with wound dehiscense, ileostomy EC fistula, c/b iATN stabilizing    ELVI - sCr stable, baseline normal kidneys at 0.9-1.0 however may end with some amount of CKD  - overall net slightly positive yesterday to 245cc, would increase IVF to account for insensible loss and leakage around drains    Anemia - 2/2 surgeries, inflammation, monitoring    Hypoalbuminemia - from poo PO intake, appropriately on TPN, agree with tpn makeup currently    Elevated Bilirubin - likely 2/2 stasis, stable    Hyperkalemia - from poor hydration and ELVI, improving, continue TPN and increased IVF as discussed with primary team    HTN - mildly elevated, monitor for now, risk of hypotension higher in hypovolemic and inflammed patient    Discussed with primary team in detail. Will follow closely with you, please call with questions or concerns     Patient is a 77 yo M with prolonged hospital course, admited for SBO and CD flare s/p multiple GI surgeries with wound dehiscense, ileostomy EC fistula, c/b iATN stabilizing    ELVI - sCr stable, baseline normal kidneys at 0.9-1.0 however may end with some amount of CKD  - overall net slightly positive yesterday to 245cc, would increase IVF to account for insensible loss and leakage around drains    Anemia - 2/2 surgeries, inflammation, monitoring    Hoarse voice - patient with likely dry throat and possible vocal cord paralysis from intubation, please increase fz of lozenges and make them standing, if does no improve by monday would consider ENT eval for vocal cord paralysis    Hypoalbuminemia - from poor PO intake, appropriately on TPN, agree with tpn makeup currently    Elevated Bilirubin - likely 2/2 stasis, stable    Hyperkalemia - from poor hydration and ELVI, improving, continue TPN and increased IVF as discussed with primary team    HTN - mildly elevated, monitor for now, risk of hypotension higher in hypovolemic and inflammed patient    Discussed with primary team in detail. Will follow closely with you, please call with questions or concerns

## 2023-09-09 NOTE — PROGRESS NOTE ADULT - SUBJECTIVE AND OBJECTIVE BOX
SUBJECTIVE:      MEDICATIONS  (STANDING):  chlorhexidine 2% Cloths 1 Application(s) Topical <User Schedule>  cholestyramine Powder (Sugar-Free) 4 Gram(s) Oral two times a day  diphenoxylate/atropine 2 Tablet(s) Oral every 6 hours  glucagon  Injectable 1 milliGRAM(s) IntraMuscular once  heparin   Injectable 5000 Unit(s) SubCutaneous every 8 hours  levothyroxine Injectable 40 MICROGram(s) IV Push <User Schedule>  lipid, fat emulsion (Fish Oil and Plant Based) 20% Infusion 0.999 Gm/kG/Day (41.67 mL/Hr) IV Continuous <Continuous>  loperamide 4 milliGRAM(s) Oral every 6 hours  metoprolol tartrate 25 milliGRAM(s) Oral every 12 hours  nystatin Powder 1 Application(s) Topical three times a day  octreotide  Injectable 100 MICROGram(s) IV Push every 8 hours  pantoprazole  Injectable 40 milliGRAM(s) IV Push daily  Parenteral Nutrition - Adult 1 Each (104 mL/Hr) TPN Continuous <Continuous>  venlafaxine XR. 300 milliGRAM(s) Oral daily    MEDICATIONS  (PRN):  benzocaine/menthol Lozenge 2 Lozenge Oral every 4 hours PRN Sore Throat  melatonin 5 milliGRAM(s) Oral at bedtime PRN Sleep      Vital Signs Last 24 Hrs  T(C): 36.8 (09 Sep 2023 05:15), Max: 36.8 (08 Sep 2023 09:23)  T(F): 98.2 (09 Sep 2023 05:15), Max: 98.3 (08 Sep 2023 09:23)  HR: 74 (09 Sep 2023 03:54) (71 - 78)  BP: 131/59 (09 Sep 2023 03:54) (104/52 - 131/61)  BP(mean): 85 (09 Sep 2023 03:54) (70 - 88)  RR: 17 (09 Sep 2023 03:54) (17 - 18)  SpO2: 97% (09 Sep 2023 03:54) (94% - 99%)    Parameters below as of 09 Sep 2023 03:54  Patient On (Oxygen Delivery Method): room air        Physical Exam:  General: NAD, resting comfortably in bed  Pulmonary: Nonlabored breathing, no respiratory distress  Cardiovascular: NSR  Abdominal: soft, NT/ND  Extremities: WWP, normal strength  Neuro: A/O x 3, CNs II-XII grossly intact, no focal deficits    I&O's Summary    07 Sep 2023 07:01  -  08 Sep 2023 07:00  --------------------------------------------------------  IN: 3175.8 mL / OUT: 2690 mL / NET: 485.8 mL    08 Sep 2023 07:01  -  09 Sep 2023 06:29  --------------------------------------------------------  IN: 2767 mL / OUT: 2730 mL / NET: 37 mL        LABS:                        8.2    12.40 )-----------( 260      ( 09 Sep 2023 06:19 )             26.7     09-08    132<L>  |  98  |  40<H>  ----------------------------<  84  5.1   |  29  |  2.05<H>    Ca    8.9      08 Sep 2023 23:15  Phos  4.2     09-08  Mg     2.0     09-08        Urinalysis Basic - ( 08 Sep 2023 23:15 )    Color: x / Appearance: x / SG: x / pH: x  Gluc: 84 mg/dL / Ketone: x  / Bili: x / Urobili: x   Blood: x / Protein: x / Nitrite: x   Leuk Esterase: x / RBC: x / WBC x   Sq Epi: x / Non Sq Epi: x / Bacteria: x      CAPILLARY BLOOD GLUCOSE      POCT Blood Glucose.: 104 mg/dL (08 Sep 2023 21:15)  POCT Blood Glucose.: 91 mg/dL (08 Sep 2023 20:01)  POCT Blood Glucose.: 72 mg/dL (08 Sep 2023 06:30)        RADIOLOGY & ADDITIONAL STUDIES:   SUBJECTIVE:  Pt seen this AM on bedside rounds. Pt endorses sleeping through the night but had small leak from vac o/n. Pt AOx4     MEDICATIONS  (STANDING):  chlorhexidine 2% Cloths 1 Application(s) Topical <User Schedule>  cholestyramine Powder (Sugar-Free) 4 Gram(s) Oral two times a day  diphenoxylate/atropine 2 Tablet(s) Oral every 6 hours  glucagon  Injectable 1 milliGRAM(s) IntraMuscular once  heparin   Injectable 5000 Unit(s) SubCutaneous every 8 hours  levothyroxine Injectable 40 MICROGram(s) IV Push <User Schedule>  lipid, fat emulsion (Fish Oil and Plant Based) 20% Infusion 0.999 Gm/kG/Day (41.67 mL/Hr) IV Continuous <Continuous>  loperamide 4 milliGRAM(s) Oral every 6 hours  metoprolol tartrate 25 milliGRAM(s) Oral every 12 hours  nystatin Powder 1 Application(s) Topical three times a day  octreotide  Injectable 100 MICROGram(s) IV Push every 8 hours  pantoprazole  Injectable 40 milliGRAM(s) IV Push daily  Parenteral Nutrition - Adult 1 Each (104 mL/Hr) TPN Continuous <Continuous>  venlafaxine XR. 300 milliGRAM(s) Oral daily    MEDICATIONS  (PRN):  benzocaine/menthol Lozenge 2 Lozenge Oral every 4 hours PRN Sore Throat  melatonin 5 milliGRAM(s) Oral at bedtime PRN Sleep      Vital Signs Last 24 Hrs  T(C): 36.8 (09 Sep 2023 05:15), Max: 36.8 (08 Sep 2023 09:23)  T(F): 98.2 (09 Sep 2023 05:15), Max: 98.3 (08 Sep 2023 09:23)  HR: 74 (09 Sep 2023 03:54) (71 - 78)  BP: 131/59 (09 Sep 2023 03:54) (104/52 - 131/61)  BP(mean): 85 (09 Sep 2023 03:54) (70 - 88)  RR: 17 (09 Sep 2023 03:54) (17 - 18)  SpO2: 97% (09 Sep 2023 03:54) (94% - 99%)    Parameters below as of 09 Sep 2023 03:54  Patient On (Oxygen Delivery Method): room air        Physical Exam:  General: NAD, resting comfortably in bed  Pulmonary: Nonlabored breathing, no respiratory distress  Cardiovascular: NSR  Abdominal: soft, NT, ND, vac in place w/ small leak at mccauley insertion into [place] bag   Extremities: WWP, normal strength  Neuro: A/O x 3, CNs II-XII grossly intact, no focal deficits    I&O's Summary    07 Sep 2023 07:01  -  08 Sep 2023 07:00  --------------------------------------------------------  IN: 3175.8 mL / OUT: 2690 mL / NET: 485.8 mL    08 Sep 2023 07:01  -  09 Sep 2023 06:29  --------------------------------------------------------  IN: 2767 mL / OUT: 2730 mL / NET: 37 mL        LABS:                        8.2    12.40 )-----------( 260      ( 09 Sep 2023 06:19 )             26.7     09-08    132<L>  |  98  |  40<H>  ----------------------------<  84  5.1   |  29  |  2.05<H>    Ca    8.9      08 Sep 2023 23:15  Phos  4.2     09-08  Mg     2.0     09-08        Urinalysis Basic - ( 08 Sep 2023 23:15 )    Color: x / Appearance: x / SG: x / pH: x  Gluc: 84 mg/dL / Ketone: x  / Bili: x / Urobili: x   Blood: x / Protein: x / Nitrite: x   Leuk Esterase: x / RBC: x / WBC x   Sq Epi: x / Non Sq Epi: x / Bacteria: x      CAPILLARY BLOOD GLUCOSE      POCT Blood Glucose.: 104 mg/dL (08 Sep 2023 21:15)  POCT Blood Glucose.: 91 mg/dL (08 Sep 2023 20:01)  POCT Blood Glucose.: 72 mg/dL (08 Sep 2023 06:30)        RADIOLOGY & ADDITIONAL STUDIES:

## 2023-09-10 NOTE — PROGRESS NOTE ADULT - ASSESSMENT

## 2023-09-10 NOTE — PROGRESS NOTE ADULT - ATTENDING COMMENTS
Crohn's, AF, s/p SBR, ileocolic resection with blowhole colostomy, end ileostomy with enteroatmospheric fistula, now with recurrent metabolic encephalopathy  physical as above  recognizes me but still hallucination that wife is in room  EEG  bolus IVF as looks dry  CT noncontrast today to look for infection  if creatinine down tomorrow, will consider contrast CT  creatinine overall better  decision making of high complexity

## 2023-09-10 NOTE — CONSULT NOTE ADULT - ASSESSMENT
78 M w/ Crohn's, AFib/Flutter s/p DCCVs on amiodarone, remote ileocectomy and open appendectomy. Admitted (6/23) for SBO vs Crohns flare, s/p NGT decompression and s/p lap TRE converted to open TRE, SBR x 3, left in discontinuity with abthera vac on (6/27), RTOR for ileocolic resection, small bowel anastomosis, and abdominal wall closure on (6/28), c/b fluid collection s/p IR aspiration of perihepatic fluid on (7/3), c/b wound dehiscence s/p RTOR exlap, washout, ileocolic resection with end ileostomy, blow hole colostomy, red rubber from ileostomy to small bowel anastomosis; vicryl bridging mesh on (7/5) transferred to SICU postoperatively for hemodynamic monitoring, with hospital course complicated by periods of AMS most recently on 9/1 and associated with oliguria, severe ELVI and hyponatremia, which resolved but now stepped back up for to SICU on 9/10 for acute AMS, intermittent hypoglycemia, AFib with RVR.     NEURO: A&Ox3. Now with AMS; Hx of depression - Continue outpatient effexor. Pain control with Dilaudid for vac changes only  CV: Clinically appears hypovolemic with dry mucus membranes. Episode of Afib with RVR, resolved with 5mg IVP Metoprolol, Hx Afib/flutter: s/p DCCV, Off amiodarone given transaminitis and elevated T bili; Continue Metoprolol 25 BID. New ST Depressions on EKG with AFib and Trop 56 - elevated trops due to Afib with RVR, trend Trop and EKG q6h; Hx HTN - Holding Norvasc, Losartan. Hx HLD: hold Lipitor given LFTs. TTE  (7/18) - PASP 64mmHg, EF 65-70%,   PULM: Saturating well on RA.   GI/FEN: NPO with sips of gatorade; Imodium/Lomotril/Octretotide for high ostomy and ECF output. PICC/TPN; transaminitis of unknown origin, distended gallbladder on CT 8/25, RUQ US negative.  : Voids, previous severe ELVI with BUN/Cr now downtrending, Repeat Ulytes and UA pending. Nephrology recommendations appreciated. q6h BMP  ENDO: mISS. Hx hypothyroid: IV Synthroid, TSH 2.180 (9.170), Recurrent Hypoglycemia on TPN  ID: Numerous SICU admissions from sepsis, most recently with concern for line sepsis. Now WBC dowtrended. Currently off abx. //// CTAP (8/25) without fluid collections, distended GB, atelectasis. Restart Imipenem (8/26--). Previously had C. tertium, Lactobacillis from his IR cx 7/3, and candida albicans, lactobacillus, vanc sensitive E faecium, vanc resistant E gallinarum, vanc resistant E casseliflavus, lactobacillus from his OR cx 7/5. Completed course of abx with imipenem (6/30-7/12, 7/23-7/30), Dapto (6/30-7/5 and 7/23-7/24). been off all abx since 7/30. empiric dapto (8/23-24) and cefepime (8/23-24).   PPX: SCDs, SQH  LINES: PIVs, LUE PICC (9/1 - )  WOUNDS/DRAINS: ECF abdominal wound with vac change Mon/Fri--next on 9/11. PT: Home health PT  DISPO: SICU

## 2023-09-10 NOTE — CONSULT NOTE ADULT - SUBJECTIVE AND OBJECTIVE BOX
HPI:  78M w/ PMHx of Crohn's, HTN, HLD, AFib/Flutter s/p multiple DCCV (on Amio), Gout, PSHx R IHR, ileocecectomy, open appy presents for abdominal pain since last night. Pt states his pain is constant, severe pain diffusely. He states he has gotten this pain previously, though not as intensely that he has managed at home. Last BM and flatus was today. Denies n/v. Denies f/c. Denies cp/sob. Denies urinary complaints. Denies PO intake today. Pt was sent for a CT scan as an outpatient that showed distended loops of bowel with a possible transition point in the RLQ w/ fecalization of bowel contents.    In the ED, pt was afebrile, nontachycardic, hypertensive, and satting well on RA. Labs significant for WBC 11.77. Hgb 13.0. LA 1.4.    PMHx: HTN, Atrial fibrillation s/p multiple DCCV, Crohn's disease, HLD, Hypothyroidism  PSHx: Ileocecectomy 30 years ago, open appy, H/O knee surgery, History of cataract surgery  Family Hx: Denies family hx of IBS, Crohn's, UC, or colon cancer.  Last Cscope: 6 months ago wnl  Last EGD: 6 months ago wnl  All: penicillin (Angioedema)    Meds: allopurinol 300 mg oral tablet: 1 tab(s) orally once a day  amiodarone 200 mg oral tablet: 1 orally once a day  metoprolol tartrate 50 mg oral tablet: 1 orally 2 times a day  rosuvastatin 5 mg oral tablet: 1 tab(s) orally once a day  Synthroid 50 mcg (0.05 mg) oral tablet: 1 tab(s) orally once a day  venlafaxine 150 mg oral tablet, extended release: 1 tab(s) orally 2 times a day      Vitals past 24h:  T(F): 97.6 (13:29), Max: 97.6 (13:29)  HR: 98 (13:29) (98 - 98)  BP: 194/72 (13:29) (194/72 - 194/72)  RR: 20 (13:29) (20 - 20)  SpO2: 98% (13:29) (98% - 98%)   (2023 16:52)      SICU ADDENDUM:  78 M w/ Crohn's, AFib/Flutter s/p DCCVs on amiodarone, remote ileocectomy and open appendectomy. Admitted () for SBO vs Crohns flare, s/p NGT decompression and s/p lap TRE converted to open TRE, SBR x 3, left in discontinuity with abthera vac on (), RTOR for ileocolic resection, small bowel anastomosis, and abdominal wall closure on (), c/b fluid collection s/p IR aspiration of perihepatic fluid on (7/3), c/b wound dehiscence s/p RTOR exlap, washout, ileocolic resection with end ileostomy, blow hole colostomy, red rubber from ileostomy to small bowel anastomosis; vicryl bridging mesh on () transferred to SICU postoperatively for hemodynamic monitoring, with hospital course complicated by periods of AMS most recently on  and associated with oliguria, severe ELVI and hyponatremia, which resolved but now stepped up to SICU for AMS, episodes of hypoglycemia to 40s-50s, Afib with RVR to 140-150s, given Metop 5mg IVP with resolution; sepsis work up - ABG 7.45/36/105/25, WBC 14.34 (12.6) Hgb 7.4 (8.3), Na 132, K 5.0, Cl 100, Bicarb 24 (18), BUN/Cr downtrending, Albumin 1.8,  Lactate 1.0, UA neg, UStudies pending; EKG with new ST depressions, Troponin 57, per cards likely secondary to Afib with RVR, cont to trend Trop and EKG q4-6h; Wound Vac with small air leak but holds suction    Allergies    penicillin (Angioedema)    Intolerances        ICU Vital Signs Last 24 Hrs  T(F): 97.5 (09-10-23 @ 05:40), Max: 98.1 (23 @ 21:59)  HR: 74 (09-10-23 @ 06:00) (70 - 98)  BP: 137/59 (09-10-23 @ 06:00) (107/53 - 159/86)  BP(mean): 90 (09-10-23 @ 06:00) (75 - 117)  ABP: --  RR: 24 (09-10-23 @ 06:00) (16 - 24)  SpO2: 94% (09-10-23 @ 06:00) (91% - 99%)    General: NAD, resting comfortably in bed. Well developed, well groomed  NEURO: AAOx3, CN II-XII grossly intact, EOMI, follows commands  HEENT: PERRL, MMM  CV: RRR, no MRG  PULM: CTAB, nonlabored breathing, no respiratory distress  ABD: soft, NT/ND. No rebound, no guarding, no tympany. Incisions CDI.   : Voids  EXTREM: WWP, no edema, no calf tenderness  VASC: No cyanosis, pallor.     LABS:    09-10    132<L>  |  100  |  33<H>  ----------------------------<  120<H>  5.0   |  24  |  1.85<H>    Ca    8.4      10 Sep 2023 05:19  Phos  4.3     09-10  Mg     1.9     09-10    TPro  7.2  /  Alb  1.8<L>  /  TBili  3.3<H>  /  DBili  2.2<H>  /  AST  57<H>  /  ALT  38  /  AlkPhos  155<H>  09-10  LIVER FUNCTIONS - ( 10 Sep 2023 05:19 )  Alb: 1.8 g/dL / Pro: 7.2 g/dL / ALK PHOS: 155 U/L / ALT: 38 U/L / AST: 57 U/L / GGT: x                               7.4    14.34 )-----------( 266      ( 10 Sep 2023 05:19 )             23.2   PT/INR - ( 10 Sep 2023 05:19 )   PT: 14.0 sec;   INR: 1.24          PTT - ( 10 Sep 2023 05:19 )  PTT:30.9 sec  ABG - ( 10 Sep 2023 03:22 )  pH, Arterial: 7.45  pH, Blood: x     /  pCO2: 36    /  pO2: 105   / HCO3: 25    / Base Excess: 1.3   /  SaO2: 99.6            Urinalysis Basic - ( 10 Sep 2023 05:52 )    Color: Yellow / Appearance: Clear / S.010 / pH: x  Gluc: x / Ketone: NEGATIVE  / Bili: Negative / Urobili: 0.2 E.U./dL   Blood: x / Protein: NEGATIVE mg/dL / Nitrite: NEGATIVE   Leuk Esterase: NEGATIVE / RBC: Many /HPF / WBC < 5 /HPF   Sq Epi: x / Non Sq Epi: x / Bacteria: Present /HPF    CAPILLARY BLOOD GLUCOSE      POCT Blood Glucose.: 98 mg/dL (10 Sep 2023 03:12)  POCT Blood Glucose.: 162 mg/dL (10 Sep 2023 02:20)  POCT Blood Glucose.: 68 mg/dL (10 Sep 2023 02:04)  POCT Blood Glucose.: 254 mg/dL (10 Sep 2023 01:13)  POCT Blood Glucose.: 59 mg/dL (10 Sep 2023 01:00)  POCT Blood Glucose.: 218 mg/dL (10 Sep 2023 00:18)  POCT Blood Glucose.: 114 mg/dL (09 Sep 2023 23:22)  POCT Blood Glucose.: 240 mg/dL (09 Sep 2023 20:50)  POCT Blood Glucose.: 46 mg/dL (09 Sep 2023 20:37)  POCT Blood Glucose.: 47 mg/dL (09 Sep 2023 20:34)  POCT Blood Glucose.: 72 mg/dL (09 Sep 2023 19:05)     HPI:  78M w/ PMHx of Crohn's, HTN, HLD, AFib/Flutter s/p multiple DCCV (on Amio), Gout, PSHx R IHR, ileocecectomy, open appy presents for abdominal pain since last night. Pt states his pain is constant, severe pain diffusely. He states he has gotten this pain previously, though not as intensely that he has managed at home. Last BM and flatus was today. Denies n/v. Denies f/c. Denies cp/sob. Denies urinary complaints. Denies PO intake today. Pt was sent for a CT scan as an outpatient that showed distended loops of bowel with a possible transition point in the RLQ w/ fecalization of bowel contents.    In the ED, pt was afebrile, nontachycardic, hypertensive, and satting well on RA. Labs significant for WBC 11.77. Hgb 13.0. LA 1.4.    PMHx: HTN, Atrial fibrillation s/p multiple DCCV, Crohn's disease, HLD, Hypothyroidism  PSHx: Ileocecectomy 30 years ago, open appy, H/O knee surgery, History of cataract surgery  Family Hx: Denies family hx of IBS, Crohn's, UC, or colon cancer.  Last Cscope: 6 months ago wnl  Last EGD: 6 months ago wnl  All: penicillin (Angioedema)    Meds: allopurinol 300 mg oral tablet: 1 tab(s) orally once a day  amiodarone 200 mg oral tablet: 1 orally once a day  metoprolol tartrate 50 mg oral tablet: 1 orally 2 times a day  rosuvastatin 5 mg oral tablet: 1 tab(s) orally once a day  Synthroid 50 mcg (0.05 mg) oral tablet: 1 tab(s) orally once a day  venlafaxine 150 mg oral tablet, extended release: 1 tab(s) orally 2 times a day      Vitals past 24h:  T(F): 97.6 (13:29), Max: 97.6 (13:29)  HR: 98 (13:29) (98 - 98)  BP: 194/72 (13:29) (194/72 - 194/72)  RR: 20 (13:29) (20 - 20)  SpO2: 98% (13:29) (98% - 98%)   (2023 16:52)      SICU ADDENDUM:  78 M w/ Crohn's, AFib/Flutter s/p DCCVs on amiodarone, remote ileocectomy and open appendectomy. Admitted () for SBO vs Crohns flare, s/p NGT decompression and s/p lap TRE converted to open TRE, SBR x 3, left in discontinuity with abthera vac on (), RTOR for ileocolic resection, small bowel anastomosis, and abdominal wall closure on (), c/b fluid collection s/p IR aspiration of perihepatic fluid on (7/3), c/b wound dehiscence s/p RTOR exlap, washout, ileocolic resection with end ileostomy, blow hole colostomy, red rubber from ileostomy to small bowel anastomosis; vicryl bridging mesh on () transferred to SICU postoperatively for hemodynamic monitoring, with hospital course complicated by periods of AMS most recently on  and associated with oliguria, severe ELVI and hyponatremia, which resolved but now stepped up to SICU for AMS, episodes of hypoglycemia to 40s-50s, Afib with RVR to 140-150s, given Metop 5mg IVP with resolution; sepsis work up - ABG 7.45/36/105/25, WBC 14.34 (12.6) Hgb 7.4 (8.3), Na 132, K 5.0, Cl 100, Bicarb 24 (18), BUN/Cr downtrending, Albumin 1.8,  Lactate 1.0, UA neg, UStudies pending; EKG with new ST depressions, Troponin 57, per cards likely secondary to Afib with RVR, cont to trend Trop and EKG q4-6h; Wound Vac with small air leak but holds suction    Allergies    penicillin (Angioedema)    Intolerances        ICU Vital Signs Last 24 Hrs  T(F): 97.5 (09-10-23 @ 05:40), Max: 98.1 (23 @ 21:59)  HR: 74 (09-10-23 @ 06:00) (70 - 98)  BP: 137/59 (09-10-23 @ 06:00) (107/53 - 159/86)  BP(mean): 90 (09-10-23 @ 06:00) (75 - 117)  ABP: --  RR: 24 (09-10-23 @ 06:00) (16 - 24)  SpO2: 94% (09-10-23 @ 06:00) (91% - 99%)    General: NAD, resting comfortably in bed. Well developed, well groomed  NEURO: AAOx3, CN II-XII grossly intact, EOMI, follows commands  HEENT: PERRL, MMM  CV: RRR, no MRG  PULM: CTAB, nonlabored breathing, no respiratory distress  ABD: soft, nontender, nondistended. No rebound, no guarding. Midline abdominal wound with wound vac with appropriate suction. Catheters in both ECF and Ileostomy with overlying ostomy appliances. JOYCE x 1 with enteric content.   : Voids  EXTREM: WWP, no edema, no calf tenderness  VASC: No cyanosis, pallor.     LABS:    09-10    132<L>  |  100  |  33<H>  ----------------------------<  120<H>  5.0   |  24  |  1.85<H>    Ca    8.4      10 Sep 2023 05:19  Phos  4.3     09-10  Mg     1.9     09-10    TPro  7.2  /  Alb  1.8<L>  /  TBili  3.3<H>  /  DBili  2.2<H>  /  AST  57<H>  /  ALT  38  /  AlkPhos  155<H>  09-10  LIVER FUNCTIONS - ( 10 Sep 2023 05:19 )  Alb: 1.8 g/dL / Pro: 7.2 g/dL / ALK PHOS: 155 U/L / ALT: 38 U/L / AST: 57 U/L / GGT: x                               7.4    14.34 )-----------( 266      ( 10 Sep 2023 05:19 )             23.2   PT/INR - ( 10 Sep 2023 05:19 )   PT: 14.0 sec;   INR: 1.24          PTT - ( 10 Sep 2023 05:19 )  PTT:30.9 sec  ABG - ( 10 Sep 2023 03:22 )  pH, Arterial: 7.45  pH, Blood: x     /  pCO2: 36    /  pO2: 105   / HCO3: 25    / Base Excess: 1.3   /  SaO2: 99.6            Urinalysis Basic - ( 10 Sep 2023 05:52 )    Color: Yellow / Appearance: Clear / S.010 / pH: x  Gluc: x / Ketone: NEGATIVE  / Bili: Negative / Urobili: 0.2 E.U./dL   Blood: x / Protein: NEGATIVE mg/dL / Nitrite: NEGATIVE   Leuk Esterase: NEGATIVE / RBC: Many /HPF / WBC < 5 /HPF   Sq Epi: x / Non Sq Epi: x / Bacteria: Present /HPF    CAPILLARY BLOOD GLUCOSE      POCT Blood Glucose.: 98 mg/dL (10 Sep 2023 03:12)  POCT Blood Glucose.: 162 mg/dL (10 Sep 2023 02:20)  POCT Blood Glucose.: 68 mg/dL (10 Sep 2023 02:04)  POCT Blood Glucose.: 254 mg/dL (10 Sep 2023 01:13)  POCT Blood Glucose.: 59 mg/dL (10 Sep 2023 01:00)  POCT Blood Glucose.: 218 mg/dL (10 Sep 2023 00:18)  POCT Blood Glucose.: 114 mg/dL (09 Sep 2023 23:22)  POCT Blood Glucose.: 240 mg/dL (09 Sep 2023 20:50)  POCT Blood Glucose.: 46 mg/dL (09 Sep 2023 20:37)  POCT Blood Glucose.: 47 mg/dL (09 Sep 2023 20:34)  POCT Blood Glucose.: 72 mg/dL (09 Sep 2023 19:05)

## 2023-09-10 NOTE — PROGRESS NOTE ADULT - ASSESSMENT
Patient is a 79 yo M with prolonged hospital course, admited for SBO and CD flare s/p multiple GI surgeries with wound dehiscense, ileostomy EC fistula, c/b iATN stabilizing    EVLI - sCr stable, baseline normal kidneys at 0.9-1.0 however may end with some amount of CKD  -getting bolus fluids, will monitor for improvement with fluids    AMS - unclear cause, ammonia rising but not elevated, ELVI improving, not consistent with uremia, did have hypoglycemia but AMS has not cleared with sugar improvement. Awaiting EEG, cult infection workup with full body scan.     Anemia - 2/2 surgeries, inflammation, monitoring    Hoarse voice - patient with likely dry throat and possible vocal cord paralysis from intubation, given AMS would be cautious with continued lozenges or fluids    Hypoalbuminemia - from poor PO intake, appropriately on TPN, agree with tpn makeup currently; does have thiamine making wernickes AMS unlikely    Elevated Bilirubin - likely 2/2 stasis, stable    Hyperkalemia - from poor hydration and ELVI, improving, continue TPN and increased IVF as discussed with primary team    HTN - mildly elevated, monitor for now, risk of hypotension higher in hypovolemic and inflamed patient    Discussed with primary team in detail. Will follow closely with you, please call with questions or concerns

## 2023-09-10 NOTE — PROGRESS NOTE ADULT - SUBJECTIVE AND OBJECTIVE BOX
NEPHROLOGY PROGRESS NOTE    Subjective: Patient seen and examined at bedside. In SICU due to AMS of unclear cause, currently still not making sense although does follow commands and answer some questions.     [OBJECTIVE]:    Vital Signs:  T(F): , Max: 98.5 (09-10-23 @ 08:55)  HR:  (70 - 98)  BP:  (107/53 - 159/86)  BP(mean):  (75 - 117)  ABP: --  ABP(mean): --  RR:  (16 - 34)  SpO2:  (91% - 99%)  CVP(mm Hg): --  CVP(cm H2O): --       @ 07:01  -  09-10 @ 07:00  --------------------------------------------------------  IN: 4913 mL / OUT: 3325 mL / NET: 1588 mL    09-10 @ 07:01  -  09-10 @ 12:13  --------------------------------------------------------  IN: 2582.4 mL / OUT: 600 mL / NET: 1982.4 mL      CAPILLARY BLOOD GLUCOSE      POCT Blood Glucose.: 127 mg/dL (10 Sep 2023 11:56)      Physical Exam:  T(F): 98.5 (09-10-23 @ 08:55)  HR: 74 (09-10-23 @ 12:00)  BP: 117/58 (09-10-23 @ 12:00)  RR: 24 (09-10-23 @ 12:00)  SpO2: 93% (09-10-23 @ 12:00)  Wt(kg): --    Constitutional: Mildly agitated in bed; NAD  HEENT:  EOMI, anicteric sclera; dry mm, voice strained  Respiratory: CTA B/L; no W/R/R, no accessory muscle use  Cardiac: +S1/S2; RRR; no M/R/G  Gastrointestinal: soft,+ostomy and suction vac  Extremities: WWP, no clubbing or cyanosis; no peripheral edema  Dermatologic: skin warm, no rashes noted  Neurologic: Moving limbs, follows commands, noted to have twitching tremor bilaterally, not answering questions appropriately or following conversation  Access: None    Medications:  MEDICATIONS  (STANDING):  chlorhexidine 2% Cloths 1 Application(s) Topical <User Schedule>  cholestyramine Powder (Sugar-Free) 4 Gram(s) Oral two times a day  dextrose 5%. 1000 milliLiter(s) (100 mL/Hr) IV Continuous <Continuous>  dextrose 5%. 1000 milliLiter(s) (50 mL/Hr) IV Continuous <Continuous>  dextrose 50% Injectable 25 Gram(s) IV Push once  dextrose 50% Injectable 12.5 Gram(s) IV Push once  dextrose 50% Injectable 25 Gram(s) IV Push once  diphenoxylate/atropine 2 Tablet(s) Oral every 6 hours  glucagon  Injectable 1 milliGRAM(s) IntraMuscular once  heparin   Injectable 5000 Unit(s) SubCutaneous every 8 hours  insulin lispro (ADMELOG) corrective regimen sliding scale   SubCutaneous three times a day before meals  iohexol 300 mG (iodine)/mL Oral Solution 30 milliLiter(s) Oral once  levothyroxine Injectable 40 MICROGram(s) IV Push <User Schedule>  lipid, fat emulsion (Fish Oil and Plant Based) 20% Infusion 0.99 Gm/kG/Day (41.67 mL/Hr) IV Continuous <Continuous>  loperamide 4 milliGRAM(s) Oral every 6 hours  metoprolol tartrate 25 milliGRAM(s) Oral every 12 hours  nystatin Powder 1 Application(s) Topical three times a day  octreotide  Injectable 100 MICROGram(s) IV Push every 8 hours  pantoprazole  Injectable 40 milliGRAM(s) IV Push daily  Parenteral Nutrition - Adult 1 Each (104 mL/Hr) TPN Continuous <Continuous>  Parenteral Nutrition - Adult 1 Each (104 mL/Hr) TPN Continuous <Continuous>  venlafaxine XR. 300 milliGRAM(s) Oral daily    MEDICATIONS  (PRN):  benzocaine/menthol Lozenge 2 Lozenge Oral every 4 hours PRN Sore Throat  dextrose Oral Gel 15 Gram(s) Oral once PRN Blood Glucose LESS THAN 70 milliGRAM(s)/deciliter  melatonin 5 milliGRAM(s) Oral at bedtime PRN Sleep      Allergies:  Allergies    penicillin (Angioedema)    Intolerances        Labs:                        7.4    14.34 )-----------( 266      ( 10 Sep 2023 05:19 )             23.2     09-10    130<L>  |  100  |  31<H>  ----------------------------<  131<H>  4.7   |  23  |  1.77<H>    Ca    8.3<L>      10 Sep 2023 05:30  Phos  3.6     09-10  Mg     1.8     09-10    TPro  7.7  /  Alb  2.1<L>  /  TBili  3.3<H>  /  DBili  2.4<H>  /  AST  56<H>  /  ALT  39  /  AlkPhos  161<H>  09-10    PT/INR - ( 10 Sep 2023 05:19 )   PT: 14.0 sec;   INR: 1.24          PTT - ( 10 Sep 2023 05:19 )  PTT:30.9 sec  Urinalysis Basic - ( 10 Sep 2023 05:52 )    Color: Yellow / Appearance: Clear / S.010 / pH: x  Gluc: x / Ketone: NEGATIVE  / Bili: Negative / Urobili: 0.2 E.U./dL   Blood: x / Protein: NEGATIVE mg/dL / Nitrite: NEGATIVE   Leuk Esterase: NEGATIVE / RBC: Many /HPF / WBC < 5 /HPF   Sq Epi: x / Non Sq Epi: x / Bacteria: Present /HPF        Radiology and other tests:  Creatinine: 1.77 mg/dL (09-10-23 @ 05:30)  Creatinine: 1.85 mg/dL (09-10-23 @ 05:19)  Creatinine: 1.92 mg/dL (23 @ 22:39)  Creatinine: 1.85 mg/dL (23 @ 16:10)  Creatinine: 1.97 mg/dL (23 @ 06:19)  Creatinine: 2.05 mg/dL (23 @ 23:15)  Creatinine: 1.95 mg/dL (23 @ 18:24)  Creatinine: 1.97 mg/dL (23 @ 07:20)  Creatinine: 2.03 mg/dL (23 @ 22:54)  Creatinine: 2.03 mg/dL (23 @ 18:55)

## 2023-09-10 NOTE — PROGRESS NOTE ADULT - SUBJECTIVE AND OBJECTIVE BOX
STATUS POST:  Exploratory laparotomy    Laparoscopic lysis of intestinal adhesions    Exploratory laparotomy    Open lysis of intestinal adhesions    Resection of small bowel    Temporary closure of abdominal cavity    Repair, anastomosis, ileocolic    Small bowel resection with anastomosis    Flap, myocutaneous, abdominal wall    Reconstruction of abdominal wall using mesh    Washout of abdominal cavity    Creation of ileostomy    Creation of colostomy    Laparoscopic lysis of intestinal adhesions    Open lysis of intestinal adhesions    Resection of small bowel    Temporary closure of abdominal cavity    Repair, anastomosis, ileocolic    Small bowel resection with anastomosis    Flap, myocutaneous, abdominal wall    Reconstruction of abdominal wall using mesh    Washout of abdominal cavity        POST OPERATIVE DAY #:     SUBJECTIVE:     Vital Signs Last 24 Hrs  T(C): 36.7 (09 Sep 2023 21:59), Max: 36.7 (09 Sep 2023 21:59)  T(F): 98.1 (09 Sep 2023 21:59), Max: 98.1 (09 Sep 2023 21:59)  HR: 75 (10 Sep 2023 04:00) (70 - 98)  BP: 118/59 (10 Sep 2023 04:00) (107/53 - 159/86)  BP(mean): 82 (10 Sep 2023 04:00) (75 - 117)  RR: 17 (10 Sep 2023 04:00) (16 - 17)  SpO2: 94% (10 Sep 2023 04:00) (91% - 99%)    Parameters below as of 10 Sep 2023 04:00  Patient On (Oxygen Delivery Method): room air        I&O's Summary    08 Sep 2023 07:01  -  09 Sep 2023 07:00  --------------------------------------------------------  IN: 2975 mL / OUT: 2730 mL / NET: 245 mL    09 Sep 2023 07:01  -  10 Sep 2023 05:19  --------------------------------------------------------  IN: 4230.3 mL / OUT: 2755 mL / NET: 1475.3 mL        Physical Exam:  General Appearance: Appears well, NAD  Pulmonary: Nonlabored breathing, no respiratory distress  Cardiovascular: NSR  Abdomen: Soft, nondisteded, appropriate incisional tenderness, dressings clean and dry and intact  Extremities: WWP, SCD's in place     LABS:                        8.3    12.60 )-----------( 275      ( 09 Sep 2023 16:10 )             25.7     09-09    134<L>  |  100  |  35<H>  ----------------------------<  83  5.7<H>   |  19<L>  |  1.92<H>    Ca    8.8      09 Sep 2023 22:39  Phos  3.9     09-09  Mg     2.0     09-09    TPro  7.9  /  Alb  2.3<L>  /  TBili  3.2<H>  /  DBili  2.3<H>  /  AST  52<H>  /  ALT  36  /  AlkPhos  159<H>  09-09      Urinalysis Basic - ( 09 Sep 2023 22:39 )    Color: x / Appearance: x / SG: x / pH: x  Gluc: 83 mg/dL / Ketone: x  / Bili: x / Urobili: x   Blood: x / Protein: x / Nitrite: x   Leuk Esterase: x / RBC: x / WBC x   Sq Epi: x / Non Sq Epi: x / Bacteria: x       STATUS POST:  Exploratory laparotomy    Laparoscopic lysis of intestinal adhesions    Exploratory laparotomy    Open lysis of intestinal adhesions    Resection of small bowel    Temporary closure of abdominal cavity    Repair, anastomosis, ileocolic    Small bowel resection with anastomosis    Flap, myocutaneous, abdominal wall    Reconstruction of abdominal wall using mesh    Washout of abdominal cavity    Creation of ileostomy    Creation of colostomy    Laparoscopic lysis of intestinal adhesions    Open lysis of intestinal adhesions    Resection of small bowel    Temporary closure of abdominal cavity    Repair, anastomosis, ileocolic    Small bowel resection with anastomosis    Flap, myocutaneous, abdominal wall    Reconstruction of abdominal wall using mesh    Washout of abdominal cavity    SUBJECTIVE: Pt seen and evaluated by chief resident on am rounds. Pt oriented to self and time, altered mental status today. Vac is in place.    Vital Signs Last 24 Hrs  T(C): 36.7 (09 Sep 2023 21:59), Max: 36.7 (09 Sep 2023 21:59)  T(F): 98.1 (09 Sep 2023 21:59), Max: 98.1 (09 Sep 2023 21:59)  HR: 75 (10 Sep 2023 04:00) (70 - 98)  BP: 118/59 (10 Sep 2023 04:00) (107/53 - 159/86)  BP(mean): 82 (10 Sep 2023 04:00) (75 - 117)  RR: 17 (10 Sep 2023 04:00) (16 - 17)  SpO2: 94% (10 Sep 2023 04:00) (91% - 99%)    Parameters below as of 10 Sep 2023 04:00  Patient On (Oxygen Delivery Method): room air        I&O's Summary    08 Sep 2023 07:01  -  09 Sep 2023 07:00  --------------------------------------------------------  IN: 2975 mL / OUT: 2730 mL / NET: 245 mL    09 Sep 2023 07:01  -  10 Sep 2023 05:19  --------------------------------------------------------  IN: 4230.3 mL / OUT: 2755 mL / NET: 1475.3 mL        Physical Exam:  General Appearance: Altered compared to yesterday, oriented to self and time only  Pulmonary: Nonlabored breathing, no respiratory distress  Cardiovascular: NSR  Abdomen: Soft, nondisteded, vac in place with brown output  Extremities: WWP, SCD's in place     LABS:                        8.3    12.60 )-----------( 275      ( 09 Sep 2023 16:10 )             25.7     09-09    134<L>  |  100  |  35<H>  ----------------------------<  83  5.7<H>   |  19<L>  |  1.92<H>    Ca    8.8      09 Sep 2023 22:39  Phos  3.9     09-09  Mg     2.0     09-09    TPro  7.9  /  Alb  2.3<L>  /  TBili  3.2<H>  /  DBili  2.3<H>  /  AST  52<H>  /  ALT  36  /  AlkPhos  159<H>  09-09      Urinalysis Basic - ( 09 Sep 2023 22:39 )    Color: x / Appearance: x / SG: x / pH: x  Gluc: 83 mg/dL / Ketone: x  / Bili: x / Urobili: x   Blood: x / Protein: x / Nitrite: x   Leuk Esterase: x / RBC: x / WBC x   Sq Epi: x / Non Sq Epi: x / Bacteria: x

## 2023-09-10 NOTE — PROGRESS NOTE ADULT - ASSESSMENT
78 M w/ Crohn's, AFib/Flutter s/p DCCVs on amiodarone, remote ileocectomy and open appendectomy. Admitted (6/23) for SBO vs Crohns flare, s/p NGT decompression and s/p lap TRE converted to open TRE, SBR x 3, left in discontinuity with abthera vac on (6/27), RTOR for ileocolic resection, small bowel anastomosis, and abdominal wall closure on (6/28), c/b fluid collection s/p IR aspiration of perihepatic fluid on (7/3), c/b wound dehiscence s/p RTOR exlap, washout, ileocolic resection with end ileostomy, blow hole colostomy, red rubber from ileostomy to small bowel anastomosis; vicryl bridging mesh on (7/5) transferred to SICU postoperatively for hemodynamic monitoring, with hospital course complicated by periods of AMS most recently on 9/1 and associated with oliguria, severe ELVI and hyponatremia, which resolved but now stepped back up for to SICU on 9/10 for acute AMS, intermittent hypoglycemia, AFib with RVR.     NEURO: A&Ox3. Now with AMS, EEG, follow Neurology recs ; Hx of depression - Continue outpatient effexor. Pain control with Dilaudid for vac changes only  CV: Clinically appears hypovolemic with dry mucus membranes. Episode of Afib with RVR, resolved with 5mg IVP Metoprolol, Hx Afib/flutter: s/p DCCV, Off amiodarone given transaminitis and elevated T bili; Continue Metoprolol 25 BID. New ST Depressions on EKG with AFib and Trop 56 - elevated trops due to Afib with RVR, trend Trop and EKG q6h; Hx HTN - Holding Norvasc, Losartan. Hx HLD: hold Lipitor given LFTs. TTE  (7/18) - PASP 64mmHg, EF 65-70%,   PULM: Saturating well on RA.   GI/FEN: NPO with sips of gatorade; Imodium/Lomotril/Octretotide for high ostomy and ECF output. PICC/TPN; transaminitis of unknown origin, distended gallbladder on CT 8/25, RUQ US negative. CT C/A/P 9/10?  : Voids, previous severe ELVI with BUN/Cr now downtrending, Repeat Ulytes and UA pending. Nephrology recommendations appreciated. q6h BMP  ENDO: mISS. Hx hypothyroid: IV Synthroid, TSH 2.180 (9.170), Recurrent Hypoglycemia on TPN  ID: Numerous SICU admissions from sepsis, most recently with concern for line sepsis. Now WBC dowtrended. Currently off abx. //// CTAP (8/25) without fluid collections, distended GB, atelectasis. Restart Imipenem (8/26--). Previously had C. tertium, Lactobacillis from his IR cx 7/3, and candida albicans, lactobacillus, vanc sensitive E faecium, vanc resistant E gallinarum, vanc resistant E casseliflavus, lactobacillus from his OR cx 7/5. Completed course of abx with imipenem (6/30-7/12, 7/23-7/30), Dapto (6/30-7/5 and 7/23-7/24). been off all abx since 7/30. empiric dapto (8/23-24) and cefepime (8/23-24).   PPX: SCDs, SQH  LINES: PIVs, LUE PICC (9/1 -)  WOUNDS/DRAINS: ECF abdominal wound with vac change Mon/Fri--next on 9/11. PT: Home health PT  DISPO: SICU

## 2023-09-10 NOTE — PROGRESS NOTE ADULT - SUBJECTIVE AND OBJECTIVE BOX
SUBJECTIVE:  Stepped up to SICU for AMS, episodes of hypoglycemia to 40s-50s, Afib with RVR to 140-150s, given Metop 5mg IVP with resolution; sepsis work up - ABG 7.45/36/105/25, WBC 14.34 (12.6) Hgb 7.4 (8.3), Na 132, K 5.0, Cl 100, Bicarb 24 (18), BUN/Cr downtrending, Albumin 1.8,  Lactate 1.0, UA neg, UStudies pending; EKG with new ST depressions, Troponin 57, per cards likely secondary to Afib with RVR, cont to trend Trop and EKG q4-6h; Wound Vac with small air leak but holds suction    Seen this morning by SICU team on call  Patient is alert and awake, with persistence of his altered mental status  No abdominal tenderness   No nausea or vomiting   Adequate urine output     MEDICATIONS  (STANDING):  chlorhexidine 2% Cloths 1 Application(s) Topical <User Schedule>  cholestyramine Powder (Sugar-Free) 4 Gram(s) Oral two times a day  dextrose 5%. 1000 milliLiter(s) (50 mL/Hr) IV Continuous <Continuous>  dextrose 5%. 1000 milliLiter(s) (100 mL/Hr) IV Continuous <Continuous>  dextrose 50% Injectable 25 Gram(s) IV Push once  dextrose 50% Injectable 12.5 Gram(s) IV Push once  dextrose 50% Injectable 25 Gram(s) IV Push once  diphenoxylate/atropine 2 Tablet(s) Oral every 6 hours  glucagon  Injectable 1 milliGRAM(s) IntraMuscular once  heparin   Injectable 5000 Unit(s) SubCutaneous every 8 hours  insulin lispro (ADMELOG) corrective regimen sliding scale   SubCutaneous three times a day before meals  levothyroxine Injectable 40 MICROGram(s) IV Push <User Schedule>  lipid, fat emulsion (Fish Oil and Plant Based) 20% Infusion 0.99 Gm/kG/Day (41.67 mL/Hr) IV Continuous <Continuous>  loperamide 4 milliGRAM(s) Oral every 6 hours  metoprolol tartrate 25 milliGRAM(s) Oral every 12 hours  nystatin Powder 1 Application(s) Topical three times a day  octreotide  Injectable 100 MICROGram(s) IV Push every 8 hours  pantoprazole  Injectable 40 milliGRAM(s) IV Push daily  Parenteral Nutrition - Adult 1 Each (104 mL/Hr) TPN Continuous <Continuous>  sodium chloride 0.9% Bolus 1000 milliLiter(s) IV Bolus once  venlafaxine XR. 300 milliGRAM(s) Oral daily    MEDICATIONS  (PRN):  benzocaine/menthol Lozenge 2 Lozenge Oral every 4 hours PRN Sore Throat  dextrose Oral Gel 15 Gram(s) Oral once PRN Blood Glucose LESS THAN 70 milliGRAM(s)/deciliter  melatonin 5 milliGRAM(s) Oral at bedtime PRN Sleep    ICU Vital Signs Last 24 Hrs  T(C): 36.9 (10 Sep 2023 08:55), Max: 36.9 (10 Sep 2023 08:55)  T(F): 98.5 (10 Sep 2023 08:55), Max: 98.5 (10 Sep 2023 08:55)  HR: 75 (10 Sep 2023 09:) (70 - 98)  BP: 124/66 (10 Sep 2023 09:00) (107/53 - 159/86)  BP(mean): 79 (10 Sep 2023 09:) (75 - 117)  RR: 34 (10 Sep 2023 09:) (16 - 34)  SpO2: 94% (10 Sep 2023 09:) (91% - 99%)    Parameters below as of 10 Sep 2023 09:00  Patient On (Oxygen Delivery Method): room air    Physical Exam:  General: NAD, resting comfortably in bed  Pulmonary: Nonlabored breathing, no respiratory distress  Cardiovascular: NSR  Abdominal: soft, NT/ND  Extremities: WWP, normal strength  Neuro: A/O x 3, CNs II-XII grossly intact, no focal deficits    I&O's Summary    09 Sep 2023 07:  -  10 Sep 2023 07:00  --------------------------------------------------------  IN: 4913 mL / OUT: 3325 mL / NET: 1588 mL    10 Sep 2023 07:01  -  10 Sep 2023 10:07  --------------------------------------------------------  IN: 291.2 mL / OUT: 400 mL / NET: -108.8 mL      LABS:                        7.4    14.34 )-----------( 266      ( 10 Sep 2023 05:19 )             23.2     09-10    132<L>  |  100  |  33<H>  ----------------------------<  120<H>  5.0   |  24  |  1.85<H>    Ca    8.4      10 Sep 2023 05:19  Phos  4.3     -10  Mg     1.9     09-10    TPro  7.2  /  Alb  1.8<L>  /  TBili  3.3<H>  /  DBili  2.2<H>  /  AST  57<H>  /  ALT  38  /  AlkPhos  155<H>  09-10    PT/INR - ( 10 Sep 2023 05:19 )   PT: 14.0 sec;   INR: 1.24          PTT - ( 10 Sep 2023 05:19 )  PTT:30.9 sec  Urinalysis Basic - ( 10 Sep 2023 05:52 )    Color: Yellow / Appearance: Clear / S.010 / pH: x  Gluc: x / Ketone: NEGATIVE  / Bili: Negative / Urobili: 0.2 E.U./dL   Blood: x / Protein: NEGATIVE mg/dL / Nitrite: NEGATIVE   Leuk Esterase: NEGATIVE / RBC: Many /HPF / WBC < 5 /HPF   Sq Epi: x / Non Sq Epi: x / Bacteria: Present /HPF      CAPILLARY BLOOD GLUCOSE      POCT Blood Glucose.: 112 mg/dL (10 Sep 2023 07:10)  POCT Blood Glucose.: 98 mg/dL (10 Sep 2023 03:12)  POCT Blood Glucose.: 162 mg/dL (10 Sep 2023 02:20)  POCT Blood Glucose.: 68 mg/dL (10 Sep 2023 02:04)  POCT Blood Glucose.: 254 mg/dL (10 Sep 2023 01:13)  POCT Blood Glucose.: 59 mg/dL (10 Sep 2023 01:00)  POCT Blood Glucose.: 218 mg/dL (10 Sep 2023 00:18)  POCT Blood Glucose.: 114 mg/dL (09 Sep 2023 23:22)  POCT Blood Glucose.: 240 mg/dL (09 Sep 2023 20:50)  POCT Blood Glucose.: 46 mg/dL (09 Sep 2023 20:37)  POCT Blood Glucose.: 47 mg/dL (09 Sep 2023 20:34)  POCT Blood Glucose.: 72 mg/dL (09 Sep 2023 19:05)    LIVER FUNCTIONS - ( 10 Sep 2023 05:19 )  Alb: 1.8 g/dL / Pro: 7.2 g/dL / ALK PHOS: 155 U/L / ALT: 38 U/L / AST: 57 U/L / GGT: x             RADIOLOGY & ADDITIONAL STUDIES:   SUBJECTIVE:  Stepped up to SICU for AMS, episodes of hypoglycemia to 40s-50s, Afib with RVR to 140-150s, given Metop 5mg IVP with resolution; sepsis work up - ABG 7.45/36/105/25, WBC 14.34 (12.6) Hgb 7.4 (8.3), Na 132, K 5.0, Cl 100, Bicarb 24 (18), BUN/Cr downtrending, Albumin 1.8,  Lactate 1.0, UA neg, UStudies pending; EKG with new ST depressions, Troponin 57, per cards likely secondary to Afib with RVR, cont to trend Trop and EKG q4-6h; Wound Vac with small air leak but holds suction    Seen this morning by SICU team on call  Patient is alert and awake, with persistence of his altered mental status  No abdominal tenderness   No nausea or vomiting   Adequate urine output     MEDICATIONS  (STANDING):  chlorhexidine 2% Cloths 1 Application(s) Topical <User Schedule>  cholestyramine Powder (Sugar-Free) 4 Gram(s) Oral two times a day  dextrose 5%. 1000 milliLiter(s) (50 mL/Hr) IV Continuous <Continuous>  dextrose 5%. 1000 milliLiter(s) (100 mL/Hr) IV Continuous <Continuous>  dextrose 50% Injectable 25 Gram(s) IV Push once  dextrose 50% Injectable 12.5 Gram(s) IV Push once  dextrose 50% Injectable 25 Gram(s) IV Push once  diphenoxylate/atropine 2 Tablet(s) Oral every 6 hours  glucagon  Injectable 1 milliGRAM(s) IntraMuscular once  heparin   Injectable 5000 Unit(s) SubCutaneous every 8 hours  insulin lispro (ADMELOG) corrective regimen sliding scale   SubCutaneous three times a day before meals  levothyroxine Injectable 40 MICROGram(s) IV Push <User Schedule>  lipid, fat emulsion (Fish Oil and Plant Based) 20% Infusion 0.99 Gm/kG/Day (41.67 mL/Hr) IV Continuous <Continuous>  loperamide 4 milliGRAM(s) Oral every 6 hours  metoprolol tartrate 25 milliGRAM(s) Oral every 12 hours  nystatin Powder 1 Application(s) Topical three times a day  octreotide  Injectable 100 MICROGram(s) IV Push every 8 hours  pantoprazole  Injectable 40 milliGRAM(s) IV Push daily  Parenteral Nutrition - Adult 1 Each (104 mL/Hr) TPN Continuous <Continuous>  sodium chloride 0.9% Bolus 1000 milliLiter(s) IV Bolus once  venlafaxine XR. 300 milliGRAM(s) Oral daily    MEDICATIONS  (PRN):  benzocaine/menthol Lozenge 2 Lozenge Oral every 4 hours PRN Sore Throat  dextrose Oral Gel 15 Gram(s) Oral once PRN Blood Glucose LESS THAN 70 milliGRAM(s)/deciliter  melatonin 5 milliGRAM(s) Oral at bedtime PRN Sleep    ICU Vital Signs Last 24 Hrs  T(C): 36.9 (10 Sep 2023 08:55), Max: 36.9 (10 Sep 2023 08:55)  T(F): 98.5 (10 Sep 2023 08:55), Max: 98.5 (10 Sep 2023 08:55)  HR: 75 (10 Sep 2023 09:00) (70 - 98)  BP: 124/66 (10 Sep 2023 09:00) (107/53 - 159/86)  BP(mean): 79 (10 Sep 2023 09:) (75 - 117)  RR: 34 (10 Sep 2023 09:) (16 - 34)  SpO2: 94% (10 Sep 2023 09:) (91% - 99%)    Parameters below as of 10 Sep 2023 09:00  Patient On (Oxygen Delivery Method): room air    Physical Exam:  General: NAD, resting comfortably in bed  Pulmonary: Nonlabored breathing, no respiratory distress  Cardiovascular: NSR  Abdominal: soft, NT, ND, vac in place w/ small leak at m[place]y insertion into [place] bag, ileostomy with greenish output, stoma appears pale, patent  Extremities: WWP, normal strength  Neuro: A/O x 3, CNs II-XII grossly intact, no focal deficits      I&O's Summary    09 Sep 2023 07:01  -  10 Sep 2023 07:00  --------------------------------------------------------  IN: 4913 mL / OUT: 3325 mL / NET: 1588 mL    10 Sep 2023 07:01  -  10 Sep 2023 10:07  --------------------------------------------------------  IN: 291.2 mL / OUT: 400 mL / NET: -108.8 mL      LABS:                        7.4    14.34 )-----------( 266      ( 10 Sep 2023 05:19 )             23.2     09-10    132<L>  |  100  |  33<H>  ----------------------------<  120<H>  5.0   |  24  |  1.85<H>    Ca    8.4      10 Sep 2023 05:19  Phos  4.3     09-10  Mg     1.9     09-10    TPro  7.2  /  Alb  1.8<L>  /  TBili  3.3<H>  /  DBili  2.2<H>  /  AST  57<H>  /  ALT  38  /  AlkPhos  155<H>  10    PT/INR - ( 10 Sep 2023 05:19 )   PT: 14.0 sec;   INR: 1.24          PTT - ( 10 Sep 2023 05:19 )  PTT:30.9 sec  Urinalysis Basic - ( 10 Sep 2023 05:52 )    Color: Yellow / Appearance: Clear / S.010 / pH: x  Gluc: x / Ketone: NEGATIVE  / Bili: Negative / Urobili: 0.2 E.U./dL   Blood: x / Protein: NEGATIVE mg/dL / Nitrite: NEGATIVE   Leuk Esterase: NEGATIVE / RBC: Many /HPF / WBC < 5 /HPF   Sq Epi: x / Non Sq Epi: x / Bacteria: Present /HPF      CAPILLARY BLOOD GLUCOSE      POCT Blood Glucose.: 112 mg/dL (10 Sep 2023 07:10)  POCT Blood Glucose.: 98 mg/dL (10 Sep 2023 03:12)  POCT Blood Glucose.: 162 mg/dL (10 Sep 2023 02:20)  POCT Blood Glucose.: 68 mg/dL (10 Sep 2023 02:04)  POCT Blood Glucose.: 254 mg/dL (10 Sep 2023 01:13)  POCT Blood Glucose.: 59 mg/dL (10 Sep 2023 01:00)  POCT Blood Glucose.: 218 mg/dL (10 Sep 2023 00:18)  POCT Blood Glucose.: 114 mg/dL (09 Sep 2023 23:22)  POCT Blood Glucose.: 240 mg/dL (09 Sep 2023 20:50)  POCT Blood Glucose.: 46 mg/dL (09 Sep 2023 20:37)  POCT Blood Glucose.: 47 mg/dL (09 Sep 2023 20:34)  POCT Blood Glucose.: 72 mg/dL (09 Sep 2023 19:05)    LIVER FUNCTIONS - ( 10 Sep 2023 05:19 )  Alb: 1.8 g/dL / Pro: 7.2 g/dL / ALK PHOS: 155 U/L / ALT: 38 U/L / AST: 57 U/L / GGT: x             RADIOLOGY & ADDITIONAL STUDIES:

## 2023-09-11 NOTE — PROGRESS NOTE ADULT - ASSESSMENT
78 M w/ Crohn's, AFib/Flutter s/p DCCVs on amiodarone, remote ileocectomy and open appendectomy. Admitted (6/23) for SBO vs Crohns flare, s/p NGT decompression and s/p lap TRE converted to open TRE, SBR x 3, left in discontinuity with abthera vac on (6/27), RTOR for ileocolic resection, small bowel anastomosis, and abdominal wall closure on (6/28), c/b fluid collection s/p IR aspiration of perihepatic fluid on (7/3), c/b wound dehiscence s/p RTOR exlap, washout, ileocolic resection with end ileostomy, blow hole colostomy, red rubber from ileostomy to small bowel anastomosis; vicryl bridging mesh on (7/5) transferred to SICU postoperatively for hemodynamic monitoring, with hospital course complicated by periods of AMS most recently on 9/1 and associated with oliguria, severe ELVI and hyponatremia, which resolved but now stepped back up for to SICU on 9/10 for acute AMS, intermittent hypoglycemia, AFib with RVR.     NEURO: Now with AMS and agitation: Precedex gtt and Haldol x1 overnight. EEG, follow Neurology recs ; Hx of depression - effexor Dc'd on 9/10 Pain control with Dilaudid for vac changes only. Melatonin PRN.  HENT: Cepacol  CV: Clinically appears hypovolemic with dry mucus membranes. Episode of Afib with RVR, resolved with 5mg IVP Metoprolol, Hx Afib/flutter: s/p DCCV, Off amiodarone given transaminitis and elevated T bili; Continue Metoprolol 5q6 while AMS. New ST Depressions on EKG with AFib and Trop 56 - elevated trops due to Afib with RVR, trend Trop and EKG q6h; Hx HTN - Holding Norvasc, Losartan. Hx HLD: hold Lipitor given LFTs. TTE  (7/18) - PASP 64mmHg, EF 65-70%,   PULM: Saturating well on RA.   GI/FEN: NPO with sips of gatorade; Imodium/Lomotril/Octretotide for high ostomy and ECF output. PICC/TPN; transaminitis of unknown origin, distended gallbladder on CT 8/25, RUQ US negative. Distended GB  will need Perc Choley   : Voids, previous severe ELVI with BUN/Cr now downtrending, Repeat Ulytes and UA pending. Nephrology recommendations appreciated. q6h BMP  ENDO: mISS. Mckeon hypothyroid: IV Synthroid, TSH 2.180 (9.170), Recurrent Hypoglycemia on TPN  ID: Numerous SICU admissions from sepsis, most recently with concern for line sepsis. Now WBC dowtrended. Currently off abx. Imipenem (9/10--) // CTAP (8/25) without fluid collections, distended GB, atelectasis. Restart Imipenem (8/26--). Previously had C. tertium, Lactobacillis from his IR cx 7/3, and candida albicans, lactobacillus, vanc sensitive E faecium, vanc resistant E gallinarum, vanc resistant E casseliflavus, lactobacillus from his OR cx 7/5. Completed course of abx with imipenem (6/30-7/12, 7/23-7/30), Dapto (6/30-7/5 and 7/23-7/24). been off all abx since 7/30. empiric dapto (8/23-24) and cefepime (8/23-24).   PPX: SCDs, SQH HELD  LINES: PIVs, LUE PICC (9/1 - )  WOUNDS/DRAINS: ECF abdominal wound with vac change Mon/Fri--next on 9/11. PT: Home health PT  DISPO: SICU   78 M w/ Crohn's, AFib/Flutter s/p DCCVs on amiodarone, remote ileocectomy and open appendectomy. Admitted (6/23) for SBO vs Crohns flare, s/p NGT decompression and s/p lap TRE converted to open TRE, SBR x 3, left in discontinuity with abthera vac on (6/27), RTOR for ileocolic resection, small bowel anastomosis, and abdominal wall closure on (6/28), c/b fluid collection s/p IR aspiration of perihepatic fluid on (7/3), c/b wound dehiscence s/p RTOR exlap, washout, ileocolic resection with end ileostomy, blow hole colostomy, red rubber from ileostomy to small bowel anastomosis; vicryl bridging mesh on (7/5) transferred to SICU postoperatively for hemodynamic monitoring, with hospital course complicated by periods of AMS most recently on 9/1 and associated with oliguria, severe ELVI and hyponatremia, which resolved but now stepped back up for to SICU on 9/10 for acute AMS, intermittent hypoglycemia, AFib with RVR.     NEURO: Now with AMS and agitation: Precedex gtt and Haldol x1 overnight. EEG, follow Neurology recs ; Hx of depression - effexor Dc'd on 9/10 Pain control with Dilaudid for vac changes only. Melatonin PRN.  HENT: Cepacol  CV: Clinically appears hypovolemic with dry mucus membranes. Episode of Afib with RVR, resolved with 5mg IVP Metoprolol, Hx Afib/flutter: s/p DCCV, Off amiodarone given transaminitis and elevated T bili; Continue Metoprolol 5q6 while AMS. New ST Depressions on EKG with AFib and Trop 56 - elevated trops due to Afib with RVR, trend Trop and EKG q6h; Hx HTN - Holding Norvasc, Losartan. Hx HLD: hold Lipitor given LFTs. TTE  (7/18) - PASP 64mmHg, EF 65-70%,   PULM: Saturating well on NC 2L  GI/FEN: NPO with sips of gatorade; Imodium/Lomotril/Octretotide for high ostomy and ECF output. PICC/TPN; transaminitis of unknown origin, distended gallbladder on CT 8/25, RUQ US negative. Distended GB  will go to IR for Perc Choley   : Voids, previous severe ELVI with BUN/Cr now downtrending, Repeat Ulytes and UA pending. Nephrology recommendations appreciated. q6h BMP, f/u urinalysis and urine lytes   ENDO: . Hx hypothyroid: IV Synthroid, TSH 2.180 (9.170), Recurrent Hypoglycemia on TPN  ID: Numerous SICU admissions from sepsis, most recently with concern for line sepsis. Now WBC dowtrended. Currently off abx. Imipenem (9/10--) // CTAP (8/25) without fluid collections, distended GB, atelectasis. Restart Imipenem (8/26--). Previously had C. tertium, Lactobacillis from his IR cx 7/3, and candida albicans, lactobacillus, vanc sensitive E faecium, vanc resistant E gallinarum, vanc resistant E casseliflavus, lactobacillus from his OR cx 7/5. Completed course of abx with imipenem (6/30-7/12, 7/23-7/30), Dapto (6/30-7/5 and 7/23-7/24). been off all abx since 7/30. empiric dapto (8/23-24) and cefepime (8/23-24). f/u ID for clearance for per chol, COVID negative   PPX: SCDs, SQH HELD  LINES: PIVs, LUE PICC (9/1 - )  WOUNDS/DRAINS: ECF abdominal wound with vac change Mon/Fri--last changed on 9/10. PT: Home health PT  DISPO: SICU   78 M w/ Crohn's, AFib/Flutter s/p DCCVs on amiodarone, remote ileocectomy and open appendectomy. Admitted (6/23) for SBO vs Crohns flare, s/p NGT decompression and s/p lap TRE converted to open TRE, SBR x 3, left in discontinuity with abthera vac on (6/27), RTOR for ileocolic resection, small bowel anastomosis, and abdominal wall closure on (6/28), c/b fluid collection s/p IR aspiration of perihepatic fluid on (7/3), c/b wound dehiscence s/p RTOR exlap, washout, ileocolic resection with end ileostomy, blow hole colostomy, red rubber from ileostomy to small bowel anastomosis; vicryl bridging mesh on (7/5) transferred to SICU postoperatively for hemodynamic monitoring, with hospital course complicated by periods of AMS most recently on 9/1 and associated with oliguria, severe ELVI and hyponatremia, which resolved but now stepped back up for to SICU on 9/10 for acute AMS, intermittent hypoglycemia, AFib with RVR.     NEURO: Now with AMS and agitation: Precedex gtt and Haldol x1 overnight. EEG, follow Neurology recs ; Hx of depression - effexor Dc'd on 9/10 Pain control with Dilaudid for vac changes only. Melatonin PRN.  HENT: Cepacol  CV: Clinically appears hypovolemic with dry mucus membranes. Episode of Afib with RVR, resolved with 5mg IVP Metoprolol, Hx Afib/flutter: s/p DCCV, Off amiodarone given transaminitis and elevated T bili; Continue Metoprolol 5q6 while AMS. New ST Depressions on EKG with AFib and Trop 56 - elevated trops due to Afib with RVR, trops leveledd at 58 Hx HTN - Holding Norvasc, Losartan. Hx HLD: hold Lipitor given LFTs. TTE  (7/18) - PASP 64mmHg, EF 65-70%,   PULM: Saturating well on NC 2L  GI/FEN: NPO with sips of gatorade; Imodium/Lomotril/Octretotide for high ostomy and ECF output. PICC/TPN; transaminitis of unknown origin, distended gallbladder on CT 8/25, RUQ US negative. Distended GB  will go to IR for Perc Choley   : Voids, previous severe ELVI with BUN/Cr now downtrending, Repeat Ulytes and UA pending. Nephrology recommendations appreciated.  f/u urinalysis and urine lytes   ENDO:  Hx hypothyroid: IV Synthroid, TSH 2.180 (9.170), Recurrent Hypoglycemia on TPN  ID: Numerous SICU admissions from sepsis, most recently with concern for line sepsis. Now WBC dowtrended. Currently off abx. Imipenem (9/10--) // CTAP (8/25) without fluid collections, distended GB, atelectasis. Restart Imipenem (8/26--). Previously had C. tertium, Lactobacillis from his IR cx 7/3, and candida albicans, lactobacillus, vanc sensitive E faecium, vanc resistant E gallinarum, vanc resistant E casseliflavus, lactobacillus from his OR cx 7/5. Completed course of abx with imipenem (6/30-7/12, 7/23-7/30), Dapto (6/30-7/5 and 7/23-7/24). been off all abx since 7/30. empiric dapto (8/23-24) and cefepime (8/23-24). f/u ID for clearance for per chol, COVID negative   PPX: SCDs, SQH HELD  LINES: PIVs, LUE PICC (9/1 - )  WOUNDS/DRAINS: ECF abdominal wound with vac change Mon/Fri--last changed on 9/10. PT: Home health PT  DISPO: SICU

## 2023-09-11 NOTE — CONSULT NOTE ADULT - ASSESSMENT
Assessment: 78 M w/ Crohn's, AFib/Flutter s/p DCCVs on amiodarone, remote ileocectomy and open appendectomy. Admitted (6/23) for SBO vs Crohns flare, s/p NGT decompression and s/p lap TRE converted to open TRE, SBR x 3, left in discontinuity with abthera vac on (6/27), RTOR for ileocolic resection, small bowel anastomosis, and abdominal wall closure on (6/28), c/b fluid collection s/p IR aspiration of perihepatic fluid on (7/3), c/b wound dehiscence s/p RTOR exlap, washout, ileocolic resection with end ileostomy, blow hole colostomy, red rubber from ileostomy to small bowel anastomosis; vicryl bridging mesh on (7/5) transferred to SICU postoperatively for hemodynamic monitoring, with hospital course complicated by periods of AMS most recently on 9/1 and associated with oliguria, severe ELVI and hyponatremia, which resolved but now stepped back up for to SICU on 9/10 for acute AMS, intermittent hypoglycemia, AFib with RVR. Started on precedex for agitation overnight. CT abd 9/10 showing distended gallbladder with sludge. IR consulted for percutaneous cholecystostomy placement. Case reviewed with Dr. Andres, plan for procedure with anesthesia.      Recommendations: Maintain NPO x 8 hours prior to procedure. D/C blood thinners.     Communicated with: SICU primary team

## 2023-09-11 NOTE — PROGRESS NOTE ADULT - SUBJECTIVE AND OBJECTIVE BOX
INFECTIOUS DISEASES CONSULT FOLLOW-UP NOTE    77yo M w/ hx Crohn’s w/ multiple flareups and surgeries including bowel resections, s/p small bowel anastomosis w/ ileostomy (July '23), admitted to ICU w/ course c/b peritonitis and wound dehiscence. ID previously following for complicated abdominal wounds including likely acalculous cholecystitis now s/p 5 day course of empiric Imipenem. Signed off on 7/27, reconsulted on 8/25, signed off 8/28, re-consulted 9/11.     Per primary team, pt was recovering well on surgical floors for continued management. However on 8/23 became acutely encephalopathic, hypotensive to 90s/50s, w/ lethargy and shaking concerning for sepsis. Upgraded to ICU at that time; suspected infectious source at the time was infected-appearing PICC line. PICC line removed and infectious workup started, however so far unrevealing (UA neg, CXR neg, BCx ngtd). Was started on Dapto and Cefepime empirically and CT AP revealed no signs of abdominal infection at this time. ID was re-consulted 8/25 and pt completed 5 day course empiric Imipenem. Since then, pt had PICC line replaced, and course has been further c/b by multiple step-ups back to SICU, including 9/1 overnight for ELVI and again last night for AMS. Sepsis work up revealed BCx NGTD, Ucx pending, and CT CAP revealed mild pulmonary edema. Patchy airspace opacities in the lung bases can   represent combination of atelectasis or pneumonia, distended GB with sludge, and ongoing large anterior abdominal wound. 9/10 abd US revealed ongoing distended GB, however without evidence of cholecystitis.     INTERVAL HPI/OVERNIGHT EVENTS/ROS:   Overnight, he became increasingly agitated, started on Precedex. Further acute episode of agitation, hypertensive to 200s systolic and HR in 120s, given Haldol 2mg. Levo briefly on for sedation related hypotension, now weaned off. Planned for Perc Deb today.      Constitutional, eyes, ENT, cardiovascular, respiratory, gastrointestinal, genitourinary, integumentary, neurological, psychiatric and heme/lymph are otherwise negative other than noted above       ANTIBIOTICS/RELEVANT:    MEDICATIONS  (STANDING):  amLODIPine   Tablet 10 milliGRAM(s) Oral every 24 hours  chlorhexidine 2% Cloths 1 Application(s) Topical <User Schedule>  chlorhexidine 2% Cloths 1 Application(s) Topical <User Schedule>  cholestyramine Powder (Sugar-Free) 4 Gram(s) Oral two times a day  dextrose 20%. 500 milliLiter(s) (65 mL/Hr) IV Continuous <Continuous>  dextrose 5%. 1000 milliLiter(s) (50 mL/Hr) IV Continuous <Continuous>  dextrose 50% Injectable 25 Gram(s) IV Push once  dextrose 50% Injectable 25 Gram(s) IV Push once  glucagon  Injectable 1 milliGRAM(s) IntraMuscular once  glucagon  Injectable 1 milliGRAM(s) IntraMuscular once  heparin   Injectable 5000 Unit(s) SubCutaneous every 8 hours  imipenem/cilastatin  IVPB 1000 milliGRAM(s) IV Intermittent every 8 hours  insulin lispro (ADMELOG) corrective regimen sliding scale   SubCutaneous every 6 hours  levothyroxine Injectable 40 MICROGram(s) IV Push <User Schedule>  loperamide 4 milliGRAM(s) Oral every 12 hours  losartan 25 milliGRAM(s) Oral daily  metoprolol tartrate 25 milliGRAM(s) Oral every 12 hours  nystatin Powder 1 Application(s) Topical three times a day  pantoprazole  Injectable 40 milliGRAM(s) IV Push daily  venlafaxine XR. 300 milliGRAM(s) Oral daily    MEDICATIONS  (PRN):  benzocaine/menthol Lozenge 2 Lozenge Oral every 4 hours PRN Sore Throat  dextrose Oral Gel 15 Gram(s) Oral once PRN Blood Glucose LESS THAN 70 milliGRAM(s)/deciliter  melatonin 5 milliGRAM(s) Oral at bedtime PRN Sleep        Vital Signs Last 24 Hrs  T(C): 36.8 (28 Aug 2023 06:01), Max: 36.8 (27 Aug 2023 21:00)  T(F): 98.2 (28 Aug 2023 06:01), Max: 98.3 (27 Aug 2023 21:00)  HR: 81 (28 Aug 2023 07:00) (79 - 84)  BP: 138/63 (28 Aug 2023 07:00) (102/66 - 143/65)  BP(mean): 86 (28 Aug 2023 07:00) (78 - 97)  RR: 18 (28 Aug 2023 07:00) (17 - 30)  SpO2: 92% (28 Aug 2023 07:00) (90% - 100%)    Parameters below as of 28 Aug 2023 07:00  Patient On (Oxygen Delivery Method): room air        08-27-23 @ 07:01  -  08-28-23 @ 07:00  --------------------------------------------------------  IN: 3084 mL / OUT: 3470 mL / NET: -386 mL    08-28-23 @ 07:01  -  08-28-23 @ 07:51  --------------------------------------------------------  IN: 65 mL / OUT: 0 mL / NET: 65 mL      PHYSICAL EXAM:  Constitutional: alert, NAD  HEENT: NCAT PERRLA, no palpable lymphadenopathy.   Pulmonary: no respiratory distress and lungs were clear to auscultation bilaterally.   Heart: heart rate was normal and rhythm regular, normal S1 and S2  Abdomen: Soft nontender. Wound vac in place, pouch w/ bilious output. R ileostomy & L JOYCE drain, surrounding skin C/d/i. Ext: Site of previous PICC-line on L arm healed, c/d/i.   Neurological: no focal deficits. AOx3        LABS:                        9.4    11.21 )-----------( 272      ( 28 Aug 2023 05:17 )             29.5     08-28    134<L>  |  103  |  8   ----------------------------<  97  4.3   |  24  |  0.97    Ca    8.7      28 Aug 2023 05:17  Phos  3.4     08-28  Mg     1.7     08-28    TPro  8.1  /  Alb  2.2<L>  /  TBili  3.8<H>  /  DBili  2.4<H>  /  AST  67<H>  /  ALT  89<H>  /  AlkPhos  198<H>  08-28    PT/INR - ( 28 Aug 2023 05:17 )   PT: 15.5 sec;   INR: 1.37          PTT - ( 28 Aug 2023 05:17 )  PTT:35.0 sec  Urinalysis Basic - ( 28 Aug 2023 05:17 )    Color: x / Appearance: x / SG: x / pH: x  Gluc: 97 mg/dL / Ketone: x  / Bili: x / Urobili: x   Blood: x / Protein: x / Nitrite: x   Leuk Esterase: x / RBC: x / WBC x   Sq Epi: x / Non Sq Epi: x / Bacteria: x        MICROBIOLOGY:      RADIOLOGY & ADDITIONAL STUDIES:  Reviewed INFECTIOUS DISEASES CONSULT FOLLOW-UP NOTE    77yo M w/ hx Crohn’s w/ multiple flareups and surgeries including bowel resections, s/p small bowel anastomosis w/ ileostomy (July '23), admitted to ICU w/ course c/b peritonitis and wound dehiscence. ID previously following for complicated abdominal wounds including likely acalculous cholecystitis now s/p 5 day course of empiric Imipenem. Signed off on 7/27, reconsulted on 8/25, signed off 8/28, re-consulted 9/11.     Per primary team, pt was recovering well on surgical floors for continued management. However on 8/23 became acutely encephalopathic, hypotensive to 90s/50s, w/ lethargy and shaking concerning for sepsis. Upgraded to ICU at that time; suspected infectious source at the time was infected-appearing PICC line. PICC line removed and infectious workup started, however so far unrevealing (UA neg, CXR neg, BCx ngtd). Was started on Dapto and Cefepime empirically and CT AP revealed no signs of abdominal infection at this time. ID was re-consulted 8/25 and pt completed 5 day course empiric Imipenem. Since then, pt had PICC line replaced, and course has been further c/b by multiple step-ups back to SICU, including 9/1 overnight for ELVI and again last night for AMS. Sepsis work up revealed BCx NGTD, Ucx pending, and CT CAP revealed mild pulmonary edema. Patchy airspace opacities in the lung bases, continued distended GB with sludge, and ongoing large anterior abdominal wound. 9/10 abd US revealed ongoing distended GB, however without evidence of cholecystitis. Plan for IR to proceed with lap cony today     Previously had C. tertium, Lactobacillis from his IR cx 7/3, and candida albicans, lactobacillus, vanc sensitive E faecium, vanc resistant E gallinarum, vanc resistant E casseliflavus, lactobacillus from his OR cx 7/5. Completed course of abx with imipenem (6/30-7/12, 7/23-7/30), Dapto (6/30-7/5 and 7/23-7/24). been off all abx since 7/30. empiric dapto (8/23-24) and cefepime (8/23-24). Started on imipenem/cilastin on 9/10.     INTERVAL HPI/OVERNIGHT EVENTS/ROS:   Overnight, he became increasingly agitated, started on Precedex. Further acute episode of agitation, hypertensive to 200s systolic and HR in 120s, given Haldol 2mg. Levo briefly on for sedation related hypotension, now weaned off. Planned for Perc Cony today.      Constitutional, eyes, ENT, cardiovascular, respiratory, gastrointestinal, genitourinary, integumentary, neurological, psychiatric and heme/lymph are otherwise negative other than noted above       ANTIBIOTICS/RELEVANT:    MEDICATIONS  (STANDING):  amLODIPine   Tablet 10 milliGRAM(s) Oral every 24 hours  chlorhexidine 2% Cloths 1 Application(s) Topical <User Schedule>  chlorhexidine 2% Cloths 1 Application(s) Topical <User Schedule>  cholestyramine Powder (Sugar-Free) 4 Gram(s) Oral two times a day  dextrose 20%. 500 milliLiter(s) (65 mL/Hr) IV Continuous <Continuous>  dextrose 5%. 1000 milliLiter(s) (50 mL/Hr) IV Continuous <Continuous>  dextrose 50% Injectable 25 Gram(s) IV Push once  dextrose 50% Injectable 25 Gram(s) IV Push once  glucagon  Injectable 1 milliGRAM(s) IntraMuscular once  glucagon  Injectable 1 milliGRAM(s) IntraMuscular once  heparin   Injectable 5000 Unit(s) SubCutaneous every 8 hours  imipenem/cilastatin  IVPB 1000 milliGRAM(s) IV Intermittent every 8 hours  insulin lispro (ADMELOG) corrective regimen sliding scale   SubCutaneous every 6 hours  levothyroxine Injectable 40 MICROGram(s) IV Push <User Schedule>  loperamide 4 milliGRAM(s) Oral every 12 hours  losartan 25 milliGRAM(s) Oral daily  metoprolol tartrate 25 milliGRAM(s) Oral every 12 hours  nystatin Powder 1 Application(s) Topical three times a day  pantoprazole  Injectable 40 milliGRAM(s) IV Push daily  venlafaxine XR. 300 milliGRAM(s) Oral daily    MEDICATIONS  (PRN):  benzocaine/menthol Lozenge 2 Lozenge Oral every 4 hours PRN Sore Throat  dextrose Oral Gel 15 Gram(s) Oral once PRN Blood Glucose LESS THAN 70 milliGRAM(s)/deciliter  melatonin 5 milliGRAM(s) Oral at bedtime PRN Sleep        Vital Signs Last 24 Hrs  T(C): 36.8 (28 Aug 2023 06:01), Max: 36.8 (27 Aug 2023 21:00)  T(F): 98.2 (28 Aug 2023 06:01), Max: 98.3 (27 Aug 2023 21:00)  HR: 81 (28 Aug 2023 07:00) (79 - 84)  BP: 138/63 (28 Aug 2023 07:00) (102/66 - 143/65)  BP(mean): 86 (28 Aug 2023 07:00) (78 - 97)  RR: 18 (28 Aug 2023 07:00) (17 - 30)  SpO2: 92% (28 Aug 2023 07:00) (90% - 100%)    Parameters below as of 28 Aug 2023 07:00  Patient On (Oxygen Delivery Method): room air        08-27-23 @ 07:01  -  08-28-23 @ 07:00  --------------------------------------------------------  IN: 3084 mL / OUT: 3470 mL / NET: -386 mL    08-28-23 @ 07:01  -  08-28-23 @ 07:51  --------------------------------------------------------  IN: 65 mL / OUT: 0 mL / NET: 65 mL      PHYSICAL EXAM:  Constitutional: alert, NAD  HEENT: NCAT PERRLA, no palpable lymphadenopathy.   Pulmonary: no respiratory distress and lungs were clear to auscultation bilaterally.   Heart: heart rate was normal and rhythm regular, normal S1 and S2  Abdomen: Soft nontender. Wound vac in place, pouch w/ bilious output. R ileostomy & L JOYCE drain, surrounding skin C/d/i. Ext: Site of previous PICC-line on L arm healed, c/d/i.   Neurological: no focal deficits. AOx3        LABS:                        9.4    11.21 )-----------( 272      ( 28 Aug 2023 05:17 )             29.5     08-28    134<L>  |  103  |  8   ----------------------------<  97  4.3   |  24  |  0.97    Ca    8.7      28 Aug 2023 05:17  Phos  3.4     08-28  Mg     1.7     08-28    TPro  8.1  /  Alb  2.2<L>  /  TBili  3.8<H>  /  DBili  2.4<H>  /  AST  67<H>  /  ALT  89<H>  /  AlkPhos  198<H>  08-28    PT/INR - ( 28 Aug 2023 05:17 )   PT: 15.5 sec;   INR: 1.37          PTT - ( 28 Aug 2023 05:17 )  PTT:35.0 sec  Urinalysis Basic - ( 28 Aug 2023 05:17 )    Color: x / Appearance: x / SG: x / pH: x  Gluc: 97 mg/dL / Ketone: x  / Bili: x / Urobili: x   Blood: x / Protein: x / Nitrite: x   Leuk Esterase: x / RBC: x / WBC x   Sq Epi: x / Non Sq Epi: x / Bacteria: x        MICROBIOLOGY:      RADIOLOGY & ADDITIONAL STUDIES:  Reviewed INFECTIOUS DISEASES CONSULT FOLLOW-UP NOTE    HPI: 79yo M w/ hx Crohn’s w/ multiple flareups and surgeries including bowel resections, s/p small bowel anastomosis w/ ileostomy (July '23), admitted to ICU w/ course c/b peritonitis and wound dehiscence. ID previously following for complicated abdominal wounds including likely acalculous cholecystitis now s/p 5 day course of empiric Imipenem. Signed off on 7/27, reconsulted on 8/25, signed off 8/28, re-consulted 9/11.     Per primary team, pt was recovering well on surgical floors for continued management. However on 8/23 became acutely encephalopathic, hypotensive to 90s/50s, w/ lethargy and shaking concerning for sepsis. Upgraded to ICU at that time; suspected infectious source at the time was infected-appearing PICC line. PICC line removed and infectious workup started, however so far unrevealing (UA neg, CXR neg, BCx ngtd). Was started on Dapto and Cefepime empirically and CT AP revealed no signs of abdominal infection at this time. ID was re-consulted 8/25 and pt completed 5 day course empiric Imipenem. Since then, pt had PICC line replaced, and course has been further c/b by multiple step-ups back to SICU, including 9/1 overnight for ELVI and again last night for AMS. Sepsis work up revealed BCx NGTD, Ucx pending, and CT CAP revealed mild pulmonary edema. Patchy airspace opacities in the lung bases, continued distended GB with sludge, and ongoing large anterior abdominal wound. 9/10 abd US revealed ongoing distended GB, however without evidence of cholecystitis. Plan for IR to proceed with lap cony today. Of note, previously had C. tertium, Lactobacillis from his IR cx 7/3, and candida albicans, lactobacillus, vanc sensitive E faecium, vanc resistant E gallinarum, vanc resistant E casseliflavus, lactobacillus from his OR cx 7/5. Completed course of abx with imipenem (6/30-7/12, 7/23-7/30), Dapto (6/30-7/5 and 7/23-7/24). been off all abx since 7/30. empiric dapto (8/23-24) and cefepime (8/23-24). Started on imipenem/cilastin on 9/10. ID re-consulted for management of acalculous cholecystitis.     INTERVAL HPI/OVERNIGHT EVENTS/ROS:   Overnight, he became increasingly agitated, started on Precedex. Further acute episode of agitation, hypertensive to 200s systolic and HR in 120s, given Haldol 2mg. Levo briefly on for sedation related hypotension, now weaned off. Planned for Perc Cony today. On exam, pt with improved mental status, AOx3, currently reports no abdominal pain, fever, or chills.       Constitutional, eyes, ENT, cardiovascular, respiratory, gastrointestinal, genitourinary, integumentary, neurological, psychiatric and heme/lymph are otherwise negative other than noted above       ANTIBIOTICS/RELEVANT:    MEDICATIONS  (STANDING):  amLODIPine   Tablet 10 milliGRAM(s) Oral every 24 hours  chlorhexidine 2% Cloths 1 Application(s) Topical <User Schedule>  chlorhexidine 2% Cloths 1 Application(s) Topical <User Schedule>  cholestyramine Powder (Sugar-Free) 4 Gram(s) Oral two times a day  dextrose 20%. 500 milliLiter(s) (65 mL/Hr) IV Continuous <Continuous>  dextrose 5%. 1000 milliLiter(s) (50 mL/Hr) IV Continuous <Continuous>  dextrose 50% Injectable 25 Gram(s) IV Push once  dextrose 50% Injectable 25 Gram(s) IV Push once  glucagon  Injectable 1 milliGRAM(s) IntraMuscular once  glucagon  Injectable 1 milliGRAM(s) IntraMuscular once  heparin   Injectable 5000 Unit(s) SubCutaneous every 8 hours  imipenem/cilastatin  IVPB 1000 milliGRAM(s) IV Intermittent every 8 hours  insulin lispro (ADMELOG) corrective regimen sliding scale   SubCutaneous every 6 hours  levothyroxine Injectable 40 MICROGram(s) IV Push <User Schedule>  loperamide 4 milliGRAM(s) Oral every 12 hours  losartan 25 milliGRAM(s) Oral daily  metoprolol tartrate 25 milliGRAM(s) Oral every 12 hours  nystatin Powder 1 Application(s) Topical three times a day  pantoprazole  Injectable 40 milliGRAM(s) IV Push daily  venlafaxine XR. 300 milliGRAM(s) Oral daily    MEDICATIONS  (PRN):  benzocaine/menthol Lozenge 2 Lozenge Oral every 4 hours PRN Sore Throat  dextrose Oral Gel 15 Gram(s) Oral once PRN Blood Glucose LESS THAN 70 milliGRAM(s)/deciliter  melatonin 5 milliGRAM(s) Oral at bedtime PRN Sleep        Vital Signs Last 24 Hrs  T(C): 36.8 (28 Aug 2023 06:01), Max: 36.8 (27 Aug 2023 21:00)  T(F): 98.2 (28 Aug 2023 06:01), Max: 98.3 (27 Aug 2023 21:00)  HR: 81 (28 Aug 2023 07:00) (79 - 84)  BP: 138/63 (28 Aug 2023 07:00) (102/66 - 143/65)  BP(mean): 86 (28 Aug 2023 07:00) (78 - 97)  RR: 18 (28 Aug 2023 07:00) (17 - 30)  SpO2: 92% (28 Aug 2023 07:00) (90% - 100%)    Parameters below as of 28 Aug 2023 07:00  Patient On (Oxygen Delivery Method): room air        08-27-23 @ 07:01  -  08-28-23 @ 07:00  --------------------------------------------------------  IN: 3084 mL / OUT: 3470 mL / NET: -386 mL    08-28-23 @ 07:01  -  08-28-23 @ 07:51  --------------------------------------------------------  IN: 65 mL / OUT: 0 mL / NET: 65 mL      PHYSICAL EXAM:  Constitutional: alert, NAD  HEENT: NCAT PERRLA, no palpable lymphadenopathy.   Pulmonary: no respiratory distress and lungs were clear to auscultation bilaterally.   Heart: heart rate was normal and rhythm regular, normal S1 and S2  Abdomen: Soft nontender. Wound vac in place, pouch w/ bilious output. R ileostomy & L JOYCE drain, surrounding skin C/d/i. Ext: Site of previous PICC-line on L arm healed, c/d/i.   Neurological: no focal deficits. AOx3        LABS:                        9.4    11.21 )-----------( 272      ( 28 Aug 2023 05:17 )             29.5     08-28    134<L>  |  103  |  8   ----------------------------<  97  4.3   |  24  |  0.97    Ca    8.7      28 Aug 2023 05:17  Phos  3.4     08-28  Mg     1.7     08-28    TPro  8.1  /  Alb  2.2<L>  /  TBili  3.8<H>  /  DBili  2.4<H>  /  AST  67<H>  /  ALT  89<H>  /  AlkPhos  198<H>  08-28    PT/INR - ( 28 Aug 2023 05:17 )   PT: 15.5 sec;   INR: 1.37          PTT - ( 28 Aug 2023 05:17 )  PTT:35.0 sec  Urinalysis Basic - ( 28 Aug 2023 05:17 )    Color: x / Appearance: x / SG: x / pH: x  Gluc: 97 mg/dL / Ketone: x  / Bili: x / Urobili: x   Blood: x / Protein: x / Nitrite: x   Leuk Esterase: x / RBC: x / WBC x   Sq Epi: x / Non Sq Epi: x / Bacteria: x        MICROBIOLOGY:      RADIOLOGY & ADDITIONAL STUDIES:  Reviewed

## 2023-09-11 NOTE — CHART NOTE - NSCHARTNOTEFT_GEN_A_CORE
Admitting Diagnosis:   Patient is a 78y old  Male who presents with a chief complaint of Surgery (27 Aug 2023 13:44)      PAST MEDICAL & SURGICAL HISTORY:  Essential hypertension  Hypertension      Atrial fibrillation  s/p cardioversion  and   Pt. reports 4 DCCV  Now on Amiodarone 200mg PO bid and Eliquis 5mg PO bid  Last DCCV 4 yrs ago at [facility]      Crohn's disease  s/p partial resection of ileum      Hyperlipidemia      Hypothyroidism      History of depression  On Venlafaxine ER 150mg PO bid      H/O knee surgery      History of cataract surgery          Current Nutrition Order: NPO; TPN via PICC- 340g Dex, 100g AA, 100g SMOF lipids; provides 2556 kcals, 100g protein, GIR 2.54 mg/kg/min    PO Intake: Good (%) [   ]  Fair (50-75%) [   ] Poor (<25%) [   ]- N/A    GI Issues:     Pain: no pain/discomfort noted    Skin Integrity: R heel DTI, LUQ JOYCE drain, EC fistula    Labs:       136  |  103  |  28<H>  ----------------------------<  138<H>  4.1   |  23  |  1.81<H>    Ca    8.2<L>      11 Sep 2023 04:47  Phos  4.2       Mg     2.0         TPro  7.7  /  Alb  2.1<L>  /  TBili  3.3<H>  /  DBili  2.4<H>  /  AST  56<H>  /  ALT  39  /  AlkPhos  161<H>  09-10    CAPILLARY BLOOD GLUCOSE      POCT Blood Glucose.: 142 mg/dL (11 Sep 2023 06:20)  POCT Blood Glucose.: 115 mg/dL (10 Sep 2023 16:41)  POCT Blood Glucose.: 127 mg/dL (10 Sep 2023 11:56)      Medications:  MEDICATIONS  (STANDING):  chlorhexidine 2% Cloths 1 Application(s) Topical <User Schedule>  cholestyramine Powder (Sugar-Free) 4 Gram(s) Oral two times a day  dexMEDEtomidine Infusion 0.5 MICROgram(s)/kG/Hr (12.6 mL/Hr) IV Continuous <Continuous>  dextrose 5%. 1000 milliLiter(s) (100 mL/Hr) IV Continuous <Continuous>  dextrose 5%. 1000 milliLiter(s) (50 mL/Hr) IV Continuous <Continuous>  dextrose 50% Injectable 25 Gram(s) IV Push once  dextrose 50% Injectable 12.5 Gram(s) IV Push once  dextrose 50% Injectable 25 Gram(s) IV Push once  diphenoxylate/atropine 2 Tablet(s) Oral every 6 hours  glucagon  Injectable 1 milliGRAM(s) IntraMuscular once  imipenem/cilastatin  IVPB 1000 milliGRAM(s) IV Intermittent every 8 hours  insulin lispro (ADMELOG) corrective regimen sliding scale   SubCutaneous three times a day before meals  levothyroxine Injectable 40 MICROGram(s) IV Push <User Schedule>  loperamide 4 milliGRAM(s) Oral every 6 hours  metoprolol tartrate Injectable 5 milliGRAM(s) IV Push every 6 hours  norepinephrine Infusion 0.05 MICROgram(s)/kG/Min (9.47 mL/Hr) IV Continuous <Continuous>  nystatin Powder 1 Application(s) Topical three times a day  octreotide  Injectable 100 MICROGram(s) IV Push every 8 hours  pantoprazole  Injectable 40 milliGRAM(s) IV Push daily  Parenteral Nutrition - Adult 1 Each (104 mL/Hr) TPN Continuous <Continuous>    MEDICATIONS  (PRN):  benzocaine/menthol Lozenge 2 Lozenge Oral every 4 hours PRN Sore Throat  dextrose Oral Gel 15 Gram(s) Oral once PRN Blood Glucose LESS THAN 70 milliGRAM(s)/deciliter  melatonin 5 milliGRAM(s) Oral at bedtime PRN Sleep      Weight in k.9kg [6 Sep 2023]  Daily 91.8kg [27 Aug 2023]  Daily 101kg [9 Aug 2023]  Daily   102.1kg [10 July 2023]  Daily 99.9kg [2023]    Weight Change: wt fluctuations throughout admission, trending down- ? due to outputs vs. fluid shifts vs. inadequate nutrition. Recommend weekly weights for trending/monitoring adequacy of nutrition provision    Estimated energy needs:   Ideal body weight (86.4kg) used for calculations as pt >100% of IBW, BMI <30 per [facility] Standards of Care. Needs estimated for age and adjusted for current clinical status, increased needs for post-op & open abd wound healing    Calories: 4812-6459 kcals based on 30-35 kcals/kg  Protein: 172.8-216 g protein based on 2.0-2.5g protein/kg  *Fluid needs per team    Subjective:     Previous Nutrition Diagnosis:    Active [   ]  Resolved [   ]    If resolved, new PES:     Goal:    Recommendations:    Education:     Risk Level: High [   ] Moderate [   ] Low [   ] Admitting Diagnosis:   Patient is a 78y old  Male who presents with a chief complaint of Surgery (27 Aug 2023 13:44)      PAST MEDICAL & SURGICAL HISTORY:  Essential hypertension  Hypertension      Atrial fibrillation  s/p cardioversion  and   Pt. reports 4 DCCV  Now on Amiodarone 200mg PO bid and Eliquis 5mg PO bid  Last DCCV 4 yrs ago at [facility]      Crohn's disease  s/p partial resection of ileum      Hyperlipidemia      Hypothyroidism      History of depression  On Venlafaxine ER 150mg PO bid      H/O knee surgery      History of cataract surgery          Current Nutrition Order: NPO; TPN via PICC- 340g Dex, 100g AA, 100g SMOF lipids; provides 2556 kcals, 100g protein, GIR 2.54 mg/kg/min    PO Intake: Good (%) [   ]  Fair (50-75%) [   ] Poor (<25%) [   ]- N/A    GI Issues: ileostomy with drain. see subjective below    Pain: no pain/discomfort noted    Skin Integrity: R heel DTI, LUQ JOYCE, mid abd wound vac, EC fistula draining via red rubber, ileostomy, L forearm skin tear    Labs:       136  |  103  |  28<H>  ----------------------------<  138<H>  4.1   |  23  |  1.81<H>    Ca    8.2<L>      11 Sep 2023 04:47  Phos  4.2       Mg     2.0         TPro  7.7  /  Alb  2.1<L>  /  TBili  3.3<H>  /  DBili  2.4<H>  /  AST  56<H>  /  ALT  39  /  AlkPhos  161<H>  09-10    CAPILLARY BLOOD GLUCOSE      POCT Blood Glucose.: 142 mg/dL (11 Sep 2023 06:20)  POCT Blood Glucose.: 115 mg/dL (10 Sep 2023 16:41)  POCT Blood Glucose.: 127 mg/dL (10 Sep 2023 11:56)      Medications:  MEDICATIONS  (STANDING):  chlorhexidine 2% Cloths 1 Application(s) Topical <User Schedule>  cholestyramine Powder (Sugar-Free) 4 Gram(s) Oral two times a day  dexMEDEtomidine Infusion 0.5 MICROgram(s)/kG/Hr (12.6 mL/Hr) IV Continuous <Continuous>  dextrose 5%. 1000 milliLiter(s) (100 mL/Hr) IV Continuous <Continuous>  dextrose 5%. 1000 milliLiter(s) (50 mL/Hr) IV Continuous <Continuous>  dextrose 50% Injectable 25 Gram(s) IV Push once  dextrose 50% Injectable 12.5 Gram(s) IV Push once  dextrose 50% Injectable 25 Gram(s) IV Push once  diphenoxylate/atropine 2 Tablet(s) Oral every 6 hours  glucagon  Injectable 1 milliGRAM(s) IntraMuscular once  imipenem/cilastatin  IVPB 1000 milliGRAM(s) IV Intermittent every 8 hours  insulin lispro (ADMELOG) corrective regimen sliding scale   SubCutaneous three times a day before meals  levothyroxine Injectable 40 MICROGram(s) IV Push <User Schedule>  loperamide 4 milliGRAM(s) Oral every 6 hours  metoprolol tartrate Injectable 5 milliGRAM(s) IV Push every 6 hours  norepinephrine Infusion 0.05 MICROgram(s)/kG/Min (9.47 mL/Hr) IV Continuous <Continuous>  nystatin Powder 1 Application(s) Topical three times a day  octreotide  Injectable 100 MICROGram(s) IV Push every 8 hours  pantoprazole  Injectable 40 milliGRAM(s) IV Push daily  Parenteral Nutrition - Adult 1 Each (104 mL/Hr) TPN Continuous <Continuous>    MEDICATIONS  (PRN):  benzocaine/menthol Lozenge 2 Lozenge Oral every 4 hours PRN Sore Throat  dextrose Oral Gel 15 Gram(s) Oral once PRN Blood Glucose LESS THAN 70 milliGRAM(s)/deciliter  melatonin 5 milliGRAM(s) Oral at bedtime PRN Sleep      Weight in k.9kg [6 Sep 2023]  Daily 91.8kg [27 Aug 2023]  Daily 101kg [9 Aug 2023]  Daily   102.1kg [10 July 2023]  Daily 99.9kg [2023]    Weight Change: wt fluctuations throughout admission, trending down- ? due to outputs vs. fluid shifts vs. inadequate nutrition. Recommend weekly weights for trending/monitoring adequacy of nutrition provision    Estimated energy needs:   Ideal body weight (86.4kg) used for calculations as pt >100% of IBW, BMI <30 per [facility] Standards of Care. Needs estimated for age and adjusted for current clinical status, increased needs for post-op & open abd wound healing    Calories: 7637-4493 kcals based on 30-35 kcals/kg  Protein: 172.8-216 g protein based on 2.0-2.5g protein/kg  *Fluid needs per team    Subjective:   78M w/ Crohn's, AFib/Flutter s/p DCCVs on amiodarone, remote ileocectomy and open appy here for SBO vs Crohns flare, s/p NGT decompression and now s/p lap TRE converted to open TRE, SBR x 3, left in discontinuity with abthera vac on , RTOR for ileocolic resection, small bowel anastomosis, and abdominal wall closure on , and s/p IR aspiration of perihepatic fluid on 7/3, stepped down to telemetry on . wound dehiscence on , RTOR ex-lap, washout, ileocolic resection with end ileostomy, blow hole colostomy, red rubber from ileostomy to small bowel anastomosis; Vicryl bridging mesh on . Transferred to SICU post op for HD monitoring. Extubated  and SDU . Has been recovering on telemetry with ECF management and prolonged TPN. Upgraded to SICU  for acute delirium and tremors, r/o sepsis vs metabolic encephalopathy. Ammonia levels normal. TPN DC'ed and started on PO diet. Stepped down to 8 Lachman on .     Chart reviewed, discussed with team. Pt remains NPO w/ sips & chips, TPN as primary means to nutrition. On 1 pressor, now on precedex. Current provision provides 29.6 kcals/kg, 1.16g protein/kg, 24.9 non-protein kcals/kg. Output x 24hrs: 15ml ostomy output, 30ml L JOYCE drain, 315ml wound vac output, 215ml abd drain, 300ml EC fistula output. Labs notable for BUN 28 [H], Creat 1.81 [H],  [H], ammonia 40 [H]. Recommend increasing protein provision to meet estimated needs. Meds reviewed. On protonix, cholestyramine, imodium, octreotide, lomotil    Previous Nutrition Diagnosis:  Increased Nutrient Needs R/T physiological demands for nutrient AEB post-op wound healing    Active [ X ]  Resolved [   ]    If resolved, new PES:     Goal:  Pt will meet at least 75% of protein & energy needs via most appropriate route for nutrition     Recommendations:  1. TPN via PICC-  Recommend 340g Dex, 172g AA, 100g SMOF lipids; provides 2844 kcals, 172g protein, GIR 2.54 mg/kg/min; provides 32.9 kcals/kg, 2.0g protein/kg, 24.9 non-protein kcals/kg IBW  - fluids & lytes per team  - c/w MVI, B1, Vit C, Zinc in TPN bag  - monitor resp status  2. Monitor renal labs  3. Weekly lipid panel  - hold lipids if TG >400  4. Daily BMP, Mg, Phos. POC BG 4-6hrs  5. Monitor potential for resuming PO diet  6. Pain & bowel regimen per team    Education: deferred    Risk Level: High [ X ] Moderate [   ] Low [   ] Admitting Diagnosis:   Patient is a 78y old  Male who presents with a chief complaint of Surgery (27 Aug 2023 13:44)      PAST MEDICAL & SURGICAL HISTORY:  Essential hypertension  Hypertension      Atrial fibrillation  s/p cardioversion  and   Pt. reports 4 DCCV  Now on Amiodarone 200mg PO bid and Eliquis 5mg PO bid  Last DCCV 4 yrs ago at [facility]      Crohn's disease  s/p partial resection of ileum      Hyperlipidemia      Hypothyroidism      History of depression  On Venlafaxine ER 150mg PO bid      H/O knee surgery      History of cataract surgery          Current Nutrition Order: NPO; TPN via PICC- 340g Dex, 100g AA, 100g SMOF lipids; provides 2556 kcals, 100g protein, GIR 2.54 mg/kg/min    PO Intake: Good (%) [   ]  Fair (50-75%) [   ] Poor (<25%) [   ]- N/A    GI Issues: ileostomy with drain. see subjective below    Pain: no pain/discomfort noted    Skin Integrity: R heel DTI, LUQ JOYCE, mid abd wound vac, EC fistula draining via red rubber, ileostomy, L forearm skin tear    Labs:       136  |  103  |  28<H>  ----------------------------<  138<H>  4.1   |  23  |  1.81<H>    Ca    8.2<L>      11 Sep 2023 04:47  Phos  4.2       Mg     2.0         TPro  7.7  /  Alb  2.1<L>  /  TBili  3.3<H>  /  DBili  2.4<H>  /  AST  56<H>  /  ALT  39  /  AlkPhos  161<H>  09-10    CAPILLARY BLOOD GLUCOSE      POCT Blood Glucose.: 142 mg/dL (11 Sep 2023 06:20)  POCT Blood Glucose.: 115 mg/dL (10 Sep 2023 16:41)  POCT Blood Glucose.: 127 mg/dL (10 Sep 2023 11:56)      Medications:  MEDICATIONS  (STANDING):  chlorhexidine 2% Cloths 1 Application(s) Topical <User Schedule>  cholestyramine Powder (Sugar-Free) 4 Gram(s) Oral two times a day  dexMEDEtomidine Infusion 0.5 MICROgram(s)/kG/Hr (12.6 mL/Hr) IV Continuous <Continuous>  dextrose 5%. 1000 milliLiter(s) (100 mL/Hr) IV Continuous <Continuous>  dextrose 5%. 1000 milliLiter(s) (50 mL/Hr) IV Continuous <Continuous>  dextrose 50% Injectable 25 Gram(s) IV Push once  dextrose 50% Injectable 12.5 Gram(s) IV Push once  dextrose 50% Injectable 25 Gram(s) IV Push once  diphenoxylate/atropine 2 Tablet(s) Oral every 6 hours  glucagon  Injectable 1 milliGRAM(s) IntraMuscular once  imipenem/cilastatin  IVPB 1000 milliGRAM(s) IV Intermittent every 8 hours  insulin lispro (ADMELOG) corrective regimen sliding scale   SubCutaneous three times a day before meals  levothyroxine Injectable 40 MICROGram(s) IV Push <User Schedule>  loperamide 4 milliGRAM(s) Oral every 6 hours  metoprolol tartrate Injectable 5 milliGRAM(s) IV Push every 6 hours  norepinephrine Infusion 0.05 MICROgram(s)/kG/Min (9.47 mL/Hr) IV Continuous <Continuous>  nystatin Powder 1 Application(s) Topical three times a day  octreotide  Injectable 100 MICROGram(s) IV Push every 8 hours  pantoprazole  Injectable 40 milliGRAM(s) IV Push daily  Parenteral Nutrition - Adult 1 Each (104 mL/Hr) TPN Continuous <Continuous>    MEDICATIONS  (PRN):  benzocaine/menthol Lozenge 2 Lozenge Oral every 4 hours PRN Sore Throat  dextrose Oral Gel 15 Gram(s) Oral once PRN Blood Glucose LESS THAN 70 milliGRAM(s)/deciliter  melatonin 5 milliGRAM(s) Oral at bedtime PRN Sleep      Weight in k.9kg [6 Sep 2023]  Daily 91.8kg [27 Aug 2023]  Daily 101kg [9 Aug 2023]  Daily   102.1kg [10 July 2023]  Daily 99.9kg [2023]    Weight Change: wt fluctuations throughout admission, trending down- ? due to outputs vs. fluid shifts vs. inadequate nutrition. Recommend weekly weights for trending/monitoring adequacy of nutrition provision    Estimated energy needs:   Ideal body weight (86.4kg) used for calculations as pt >100% of IBW, BMI <30 per [facility] Standards of Care. Needs estimated for age and adjusted for current clinical status, increased needs for post-op & open abd wound healing    Calories: 9551-4772 kcals based on 30-35 kcals/kg  Protein: 172.8-216 g protein based on 2.0-2.5g protein/kg  *Fluid needs per team    Subjective:   78M w/ Crohn's, AFib/Flutter s/p DCCVs on amiodarone, remote ileocectomy and open appy here for SBO vs Crohns flare, s/p NGT decompression and now s/p lap TRE converted to open TRE, SBR x 3, left in discontinuity with abthera vac on , RTOR for ileocolic resection, small bowel anastomosis, and abdominal wall closure on , and s/p IR aspiration of perihepatic fluid on 7/3, stepped down to telemetry on . wound dehiscence on , RTOR ex-lap, washout, ileocolic resection with end ileostomy, blow hole colostomy, red rubber from ileostomy to small bowel anastomosis; Vicryl bridging mesh on . Transferred to SICU post op for HD monitoring. Extubated  and SDU . Has been recovering on telemetry with ECF management and prolonged TPN. Upgraded to SICU  for acute delirium and tremors, r/o sepsis vs metabolic encephalopathy. Ammonia levels normal. TPN DC'ed and started on PO diet. Stepped down to 8 Lachman on .     Chart reviewed, discussed with team. Pt remains NPO w/ sips & chips, TPN as primary means to nutrition. Afebrile. HR WNL, BP trending low. MAPs >65 mmhg, on 1 pressor, on precedex. Current provision provides 29.6 kcals/kg, 1.16g protein/kg, 24.9 non-protein kcals/kg. Output x 24hrs: 15ml ostomy output, 30ml L JOYCE drain, 315ml wound vac output, 215ml abd drain, 300ml EC fistula output. Labs notable for BUN 28 [H], Creat 1.81 [H],  [H], ammonia 40 [H]. Recommend increasing protein provision to meet estimated needs. Meds reviewed. On protonix, cholestyramine, imodium, octreotide, lomotil    Previous Nutrition Diagnosis:  Increased Nutrient Needs R/T physiological demands for nutrient AEB post-op wound healing    Active [ X ]  Resolved [   ]    If resolved, new PES:     Goal:  Pt will meet at least 75% of protein & energy needs via most appropriate route for nutrition     Recommendations:  1. TPN via PICC-  Recommend 340g Dex, 172g AA, 100g SMOF lipids; provides 2844 kcals, 172g protein, GIR 2.54 mg/kg/min; provides 32.9 kcals/kg, 2.0g protein/kg, 24.9 non-protein kcals/kg IBW  - fluids & lytes per team  - c/w MVI, B1, Vit C, Zinc in TPN bag  - monitor resp status  2. Monitor renal labs  3. Weekly lipid panel  - hold lipids if TG >400  4. Daily BMP, Mg, Phos. POC BG 4-6hrs  5. Monitor potential for resuming PO diet  6. Pain & bowel regimen per team    Education: deferred    Risk Level: High [ X ] Moderate [   ] Low [   ]

## 2023-09-11 NOTE — PROGRESS NOTE ADULT - ASSESSMENT
78 M w/ Crohn's, AFib/Flutter s/p DCCVs on amiodarone, remote ileocectomy and open appendectomy. Admitted (6/23) for SBO vs Crohns flare, s/p NGT decompression and s/p lap TRE converted to open TRE, SBR x 3, left in discontinuity with abthera vac on (6/27), RTOR for ileocolic resection, small bowel anastomosis, and abdominal wall closure on (6/28), c/b fluid collection s/p IR aspiration of perihepatic fluid on (7/3), c/b wound dehiscence s/p RTOR exlap, washout, ileocolic resection with end ileostomy, blow hole colostomy, red rubber from ileostomy to small bowel anastomosis; vicryl bridging mesh on (7/5) transferred to SICU postoperatively for hemodynamic monitoring, with hospital course complicated by periods of AMS most recently on 9/1 and associated with oliguria, severe ELVI and hyponatremia, which resolved but now stepped back up for to SICU on 9/10 for acute AMS, intermittent hypoglycemia, AFib with RVR.     NEURO: Now with AMS and agitation: Precedex gtt and Haldol x1 overnight. EEG, follow Neurology recs ; Hx of depression - effexor Dc'd on 9/10 Pain control with Dilaudid for vac changes only. Melatonin PRN.  HENT: Cepacol  CV: Clinically appears hypovolemic with dry mucus membranes. Episode of Afib with RVR, resolved with 5mg IVP Metoprolol, Hx Afib/flutter: s/p DCCV, Off amiodarone given transaminitis and elevated T bili; Continue Metoprolol 5q6 while AMS. New ST Depressions on EKG with AFib and Trop 56 - elevated trops due to Afib with RVR, trend Trop and EKG q6h; Hx HTN - Holding Norvasc, Losartan. Hx HLD: hold Lipitor given LFTs. TTE  (7/18) - PASP 64mmHg, EF 65-70%,   PULM: Saturating well on RA.   GI/FEN: NPO with sips of gatorade; Imodium/Lomotril/Octretotide for high ostomy and ECF output. PICC/TPN; transaminitis of unknown origin, distended gallbladder on CT 8/25, RUQ US negative. Distended GB  will need Perc Choley   : Voids, previous severe ELVI with BUN/Cr now downtrending, Repeat Ulytes and UA pending. Nephrology recommendations appreciated. q6h BMP  ENDO: mISS. Mckeon hypothyroid: IV Synthroid, TSH 2.180 (9.170), Recurrent Hypoglycemia on TPN  ID: Numerous SICU admissions from sepsis, most recently with concern for line sepsis. Now WBC dowtrended. Currently off abx. Imipenem (9/10--) // CTAP (8/25) without fluid collections, distended GB, atelectasis. Restart Imipenem (8/26--). Previously had C. tertium, Lactobacillis from his IR cx 7/3, and candida albicans, lactobacillus, vanc sensitive E faecium, vanc resistant E gallinarum, vanc resistant E casseliflavus, lactobacillus from his OR cx 7/5. Completed course of abx with imipenem (6/30-7/12, 7/23-7/30), Dapto (6/30-7/5 and 7/23-7/24). been off all abx since 7/30. empiric dapto (8/23-24) and cefepime (8/23-24).   PPX: SCDs, SQH HELD  LINES: PIVs, LUE PICC (9/1 - )  WOUNDS/DRAINS: ECF abdominal wound with vac change Mon/Fri--next on 9/11. PT: Home health PT  DISPO: SICU

## 2023-09-11 NOTE — PROCEDURE NOTE - PROCEDURE FINDINGS AND DETAILS
percutaneous cholecystostomy placement under US guidance. 180 cc of billious fluid was aspirated.
10ml serosanginous fluid aspirated

## 2023-09-11 NOTE — PROGRESS NOTE ADULT - SUBJECTIVE AND OBJECTIVE BOX
INTERVAL HPI/OVERNIGHT EVENTS:  Patient seen and examined. Was on precedex this am, as he was agitated due to visual hallucination. Patient re- interviewed in the afternoon off precedex, he remembers the event overnight, he was aware he was hallucinating but it was very scary for him and he requested to be sedated.     MEDICATIONS  (STANDING):  chlorhexidine 2% Cloths 1 Application(s) Topical <User Schedule>  cholestyramine Powder (Sugar-Free) 4 Gram(s) Oral two times a day  dexMEDEtomidine Infusion 0.5 MICROgram(s)/kG/Hr (12.6 mL/Hr) IV Continuous <Continuous>  dextrose 5%. 1000 milliLiter(s) (100 mL/Hr) IV Continuous <Continuous>  dextrose 5%. 1000 milliLiter(s) (50 mL/Hr) IV Continuous <Continuous>  dextrose 50% Injectable 25 Gram(s) IV Push once  dextrose 50% Injectable 12.5 Gram(s) IV Push once  dextrose 50% Injectable 25 Gram(s) IV Push once  diphenoxylate/atropine 2 Tablet(s) Oral every 6 hours  glucagon  Injectable 1 milliGRAM(s) IntraMuscular once  heparin   Injectable 5000 Unit(s) SubCutaneous every 8 hours  imipenem/cilastatin  IVPB 500 milliGRAM(s) IV Intermittent every 8 hours  insulin lispro (ADMELOG) corrective regimen sliding scale   SubCutaneous every 6 hours  levothyroxine Injectable 40 MICROGram(s) IV Push <User Schedule>  lipid, fat emulsion (Fish Oil and Plant Based) 20% Infusion 1 Gm/kG/Day (42.1 mL/Hr) IV Continuous <Continuous>  loperamide 4 milliGRAM(s) Oral every 6 hours  metoprolol tartrate Injectable 5 milliGRAM(s) IV Push every 6 hours  norepinephrine Infusion 0.05 MICROgram(s)/kG/Min (9.47 mL/Hr) IV Continuous <Continuous>  nystatin Powder 1 Application(s) Topical three times a day  octreotide  Injectable 100 MICROGram(s) IV Push every 8 hours  pantoprazole  Injectable 40 milliGRAM(s) IV Push daily  Parenteral Nutrition - Adult 1 Each (104 mL/Hr) TPN Continuous <Continuous>  Parenteral Nutrition - Adult 1 Each (104 mL/Hr) TPN Continuous <Continuous>    MEDICATIONS  (PRN):  benzocaine/menthol Lozenge 2 Lozenge Oral every 4 hours PRN Sore Throat  dextrose Oral Gel 15 Gram(s) Oral once PRN Blood Glucose LESS THAN 70 milliGRAM(s)/deciliter  melatonin 5 milliGRAM(s) Oral at bedtime PRN Sleep      Allergies    penicillin (Angioedema)    Intolerances        Vital Signs Last 24 Hrs  T(C): 36.4 (11 Sep 2023 15:27), Max: 36.8 (10 Sep 2023 17:23)  T(F): 97.6 (11 Sep 2023 09:18), Max: 98.2 (10 Sep 2023 17:23)  HR: 72 (11 Sep 2023 16:00) (70 - 120)  BP: 136/90 (11 Sep 2023 16:00) (79/40 - 227/90)  BP(mean): 108 (11 Sep 2023 16:00) (54 - 129)  RR: 24 (11 Sep 2023 16:00) (13 - 43)  SpO2: 96% (11 Sep 2023 16:00) (90% - 100%)    Parameters below as of 11 Sep 2023 17:00  Patient On (Oxygen Delivery Method): room air        Physical exam:    GEN: NAD, pleasant, cooperative   MENTAL STATUS: AAOx3  LANG/SPEECH: Fluent, intact naming, repetition & comprehension.   CRANIAL NERVES:  II: Pupils equal and reactive, no RAPD, normal visual field  III, IV, VI:no gaze preference or deviation.  V: normal  VII: no facial asymmetry  VIII: normal hearing to speech  MOTOR: 5/5 in both upper and lower extremities. Inconsistent rigidity  REFLEXES: 2+ biceps mute patellar (knee surgeries), 2+ ankle. mute plantar  SENSORY: Normal to touch and vibration  COORD: Intention tremor.   Gait: Deferred            LABS:                        7.1    14.28 )-----------( 240      ( 11 Sep 2023 04:47 )             22.7     09-11    136  |  103  |  28<H>  ----------------------------<  138<H>  4.1   |  23  |  1.81<H>    Ca    8.2<L>      11 Sep 2023 04:47  Phos  4.2     09-11  Mg     2.0     09-11    TPro  7.7  /  Alb  2.1<L>  /  TBili  3.3<H>  /  DBili  2.4<H>  /  AST  56<H>  /  ALT  39  /  AlkPhos  161<H>  09-10    PT/INR - ( 11 Sep 2023 04:47 )   PT: 13.8 sec;   INR: 1.22          PTT - ( 11 Sep 2023 04:47 )  PTT:32.0 sec  Urinalysis Basic - ( 11 Sep 2023 08:04 )    Color: Yellow / Appearance: Clear / S.010 / pH: x  Gluc: x / Ketone: NEGATIVE  / Bili: Negative / Urobili: 0.2 E.U./dL   Blood: x / Protein: Trace mg/dL / Nitrite: NEGATIVE   Leuk Esterase: NEGATIVE / RBC: > 10 /HPF / WBC < 5 /HPF   Sq Epi: x / Non Sq Epi: x / Bacteria: Present /HPF        RADIOLOGY & ADDITIONAL TESTS:    Continuous  Mild generalized  background slowing suggestive of a similar degree of diffuse or multifocal  dysfunction.  No epileptiform activity and no significant clinical events occurred.     Given re-assuring EEG, if low suspicion for seizure, would consider disconnect EEG.

## 2023-09-11 NOTE — PROGRESS NOTE ADULT - SUBJECTIVE AND OBJECTIVE BOX
STATUS POST:      POST OPERATIVE DAY #:     INTERVAL:   Over the weekend he was stepped back up to the SICU AMS, episodes of hyperkalemia treated with insulin/D50, hypoglycemia to 40s-50s resolved with dextrose, and Afib with RVR to 140-150s, given Metoprolol 5mg IVP with resolution. Underwent full sepsis workup with normal CXR, neg UA, BCx with ngtd, WBC 14 (12), Hgb 7.4 (8.3), Na 132, K 5, downtrending BUN/Cr, ammonia normal, new ST depressions in anterior leads and mildly elevated Trops at 57 but per cards was likely due to the rapid AFib, and repeat has been stable at 58. Overall outputs had also been less than 1000 over 24 hours  CTCAP demonstrated mild pulmonary edema with possible atelectasis or pneumonia;   Gallbladder with stones/sludge  RUQ US demonstrated distended gallbladder with sludge. CBD 0.4cm  EEG is ongoing  Vac was changed, with catheter now in ileostomy and he was started on Imipenem per ID  Overnight he became increasingly agitated, started on Precedex. Further acute episode of agitation, hypertensive to 200s systolic and HR in 120s, given Haldol 2mg. Levo briefly on for sedation related hypotension, now weaned off. Preopped for Perc Deb today.      MEDICATIONS  (STANDING):  chlorhexidine 2% Cloths 1 Application(s) Topical <User Schedule>  cholestyramine Powder (Sugar-Free) 4 Gram(s) Oral two times a day  dexMEDEtomidine Infusion 0.5 MICROgram(s)/kG/Hr (12.6 mL/Hr) IV Continuous <Continuous>  dextrose 5%. 1000 milliLiter(s) (100 mL/Hr) IV Continuous <Continuous>  dextrose 5%. 1000 milliLiter(s) (50 mL/Hr) IV Continuous <Continuous>  dextrose 50% Injectable 25 Gram(s) IV Push once  dextrose 50% Injectable 12.5 Gram(s) IV Push once  dextrose 50% Injectable 25 Gram(s) IV Push once  diphenoxylate/atropine 2 Tablet(s) Oral every 6 hours  glucagon  Injectable 1 milliGRAM(s) IntraMuscular once  imipenem/cilastatin  IVPB 1000 milliGRAM(s) IV Intermittent every 8 hours  insulin lispro (ADMELOG) corrective regimen sliding scale   SubCutaneous three times a day before meals  levothyroxine Injectable 40 MICROGram(s) IV Push <User Schedule>  lipid, fat emulsion (Fish Oil and Plant Based) 20% Infusion 0.99 Gm/kG/Day (41.67 mL/Hr) IV Continuous <Continuous>  loperamide 4 milliGRAM(s) Oral every 6 hours  metoprolol tartrate Injectable 5 milliGRAM(s) IV Push every 6 hours  norepinephrine Infusion 0.05 MICROgram(s)/kG/Min (9.47 mL/Hr) IV Continuous <Continuous>  nystatin Powder 1 Application(s) Topical three times a day  octreotide  Injectable 100 MICROGram(s) IV Push every 8 hours  pantoprazole  Injectable 40 milliGRAM(s) IV Push daily  Parenteral Nutrition - Adult 1 Each (104 mL/Hr) TPN Continuous <Continuous>    MEDICATIONS  (PRN):  benzocaine/menthol Lozenge 2 Lozenge Oral every 4 hours PRN Sore Throat  dextrose Oral Gel 15 Gram(s) Oral once PRN Blood Glucose LESS THAN 70 milliGRAM(s)/deciliter  melatonin 5 milliGRAM(s) Oral at bedtime PRN Sleep      Vital Signs Last 24 Hrs  T(C): 36.3 (11 Sep 2023 05:09), Max: 36.8 (10 Sep 2023 17:23)  T(F): 97.4 (11 Sep 2023 05:09), Max: 98.2 (10 Sep 2023 17:23)  HR: 73 (11 Sep 2023 07:45) (72 - 120)  BP: 99/56 (11 Sep 2023 07:45) (79/40 - 227/90)  BP(mean): 74 (11 Sep 2023 07:45) (54 - 129)  RR: 18 (11 Sep 2023 07:45) (16 - 43)  SpO2: 98% (11 Sep 2023 07:45) (90% - 100%)    Parameters below as of 11 Sep 2023 07:45  Patient On (Oxygen Delivery Method): nasal cannula    O2 Concentration (%): 2    Physical Exam  General: Patient is doing well and lying in bed comfortably, does not appear agitated while on Precedex  Constitutional: alert and oriented; acknowledges intermittent episodes of AMS/confusion  Pulm: Nonlabored breathing, no respiratory distress  CV: Regular rate and rhythm, normal sinus rhythm  Abd: soft, nontender, nondistended. No rebound, no guarding. Midline abdominal wound with wound vac with appropriate suction. Catheters in both ECF and Ileostomy with overlying ostomy appliances. JOYCE x 1 with enteric content.   Extremities: warm, well perfused, no edema      I&O's Detail    10 Sep 2023 07:01  -  11 Sep 2023 07:00  --------------------------------------------------------  IN:    Dexmedetomidine: 181.8 mL    Fat Emulsion (Fish Oil &amp; Plant Based) 20% Infusion: 166.4 mL    Fat Emulsion (Fish Oil &amp; Plant Based) 20% Infusion: 499.2 mL    IV PiggyBack: 500 mL    Norepinephrine: 11.4 mL    Sodium Chloride 0.9% Bolus: 2000 mL    TPN (Total Parenteral Nutrition): 2288 mL  Total IN: 5646.8 mL    OUT:    Bulb (mL): 30 mL    Drain (mL): 215 mL    Drain (mL): 300 mL    Ileostomy (mL): 15 mL    VAC (Vacuum Assisted Closure) System (mL): 315 mL    Voided (mL): 2100 mL  Total OUT: 2975 mL    Total NET: 2671.8 mL        LABS:                        7.1    14.28 )-----------( 240      ( 11 Sep 2023 04:47 )             22.7     09-11    136  |  103  |  28<H>  ----------------------------<  138<H>  4.1   |  23  |  1.81<H>    Ca    8.2<L>      11 Sep 2023 04:47  Phos  4.2     09-11  Mg     2.0     09-11    TPro  7.7  /  Alb  2.1<L>  /  TBili  3.3<H>  /  DBili  2.4<H>  /  AST  56<H>  /  ALT  39  /  AlkPhos  161<H>  09-10    PT/INR - ( 11 Sep 2023 04:47 )   PT: 13.8 sec;   INR: 1.22          PTT - ( 11 Sep 2023 04:47 )  PTT:32.0 sec  Urinalysis Basic - ( 11 Sep 2023 04:47 )    Color: x / Appearance: x / SG: x / pH: x  Gluc: 138 mg/dL / Ketone: x  / Bili: x / Urobili: x   Blood: x / Protein: x / Nitrite: x   Leuk Esterase: x / RBC: x / WBC x   Sq Epi: x / Non Sq Epi: x / Bacteria: x

## 2023-09-11 NOTE — PROGRESS NOTE ADULT - ATTENDING COMMENTS
Crohn's, AF, SBR with ileocolic resection, blowhole colostomy, ileostomy with metabolic encephalopathy, ATN  physical as above  renal stable  empiric cholecystostomy done  continue empiric zosyn  continue metoprolol  can DC EEG  seroquel dose tonight  TPN written  decision making of high complexity

## 2023-09-11 NOTE — CONSULT NOTE ADULT - SUBJECTIVE AND OBJECTIVE BOX
78 M w/ Crohn's, AFib/Flutter s/p DCCVs on amiodarone, remote ileocectomy and open appendectomy. Admitted (6/23) for SBO vs Crohns flare, s/p NGT decompression and s/p lap TRE converted to open TRE, SBR x 3, left in discontinuity with abthera vac on (6/27), RTOR for ileocolic resection, small bowel anastomosis, and abdominal wall closure on (6/28), c/b fluid collection s/p IR aspiration of perihepatic fluid on (7/3), c/b wound dehiscence s/p RTOR exlap, washout, ileocolic resection with end ileostomy, blow hole colostomy, red rubber from ileostomy to small bowel anastomosis; vicryl bridging mesh on (7/5) transferred to SICU postoperatively for hemodynamic monitoring, with hospital course complicated by periods of AMS most recently on 9/1 and associated with oliguria, severe ELVI and hyponatremia, which resolved but now stepped back up for to SICU on 9/10 for acute AMS, intermittent hypoglycemia, AFib with RVR. Started on precedex for agitation overnight. CT abd 9/10 showing distended gallbladder with sludge. IR consulted for percutaneous cholecystostomy placement.     Clinical History: SBO (SMALL BOWEL OBSTRUCTION)    Handoff    MEWS Score    Essential hypertension    Atrial fibrillation    Crohn's disease    Hyperlipidemia    Hypothyroidism    ELVI (acute kidney injury)    History of depression    Small bowel obstruction    Ileocolic anastomotic leak    Small bowel anastomotic leak    Small bowel obstruction    Ileocolic anastomotic leak    Small bowel anastomotic leak    Exploratory laparotomy    SBO (small bowel obstruction)    Laparoscopic lysis of intestinal adhesions    Exploratory laparotomy    Open lysis of intestinal adhesions    Resection of small bowel    Temporary closure of abdominal cavity    Repair, anastomosis, ileocolic    Small bowel resection with anastomosis    Flap, myocutaneous, abdominal wall    Reconstruction of abdominal wall using mesh    Washout of abdominal cavity    Creation of ileostomy    Creation of colostomy    Other specified personal history presenting hazards to health    H/O knee surgery    History of cataract surgery    ABDO PAIN    90+    Room Service Assist    Room Service Assist    Laparoscopic lysis of intestinal adhesions    Open lysis of intestinal adhesions    Resection of small bowel    Temporary closure of abdominal cavity    Repair, anastomosis, ileocolic    Small bowel resection with anastomosis    Flap, myocutaneous, abdominal wall    Reconstruction of abdominal wall using mesh    Washout of abdominal cavity    [email or web address]        Meds:benzocaine/menthol Lozenge 2 Lozenge Oral every 4 hours PRN  chlorhexidine 2% Cloths 1 Application(s) Topical <User Schedule>  cholestyramine Powder (Sugar-Free) 4 Gram(s) Oral two times a day  dexMEDEtomidine Infusion 0.5 MICROgram(s)/kG/Hr IV Continuous <Continuous>  dextrose 5%. 1000 milliLiter(s) IV Continuous <Continuous>  dextrose 5%. 1000 milliLiter(s) IV Continuous <Continuous>  dextrose 50% Injectable 25 Gram(s) IV Push once  dextrose 50% Injectable 12.5 Gram(s) IV Push once  dextrose 50% Injectable 25 Gram(s) IV Push once  dextrose Oral Gel 15 Gram(s) Oral once PRN  diphenoxylate/atropine 2 Tablet(s) Oral every 6 hours  glucagon  Injectable 1 milliGRAM(s) IntraMuscular once  imipenem/cilastatin  IVPB 1000 milliGRAM(s) IV Intermittent every 8 hours  insulin lispro (ADMELOG) corrective regimen sliding scale   SubCutaneous three times a day before meals  levothyroxine Injectable 40 MICROGram(s) IV Push <User Schedule>  lipid, fat emulsion (Fish Oil and Plant Based) 20% Infusion 1 Gm/kG/Day IV Continuous <Continuous>  loperamide 4 milliGRAM(s) Oral every 6 hours  melatonin 5 milliGRAM(s) Oral at bedtime PRN  metoprolol tartrate Injectable 5 milliGRAM(s) IV Push every 6 hours  norepinephrine Infusion 0.05 MICROgram(s)/kG/Min IV Continuous <Continuous>  nystatin Powder 1 Application(s) Topical three times a day  octreotide  Injectable 100 MICROGram(s) IV Push every 8 hours  pantoprazole  Injectable 40 milliGRAM(s) IV Push daily  Parenteral Nutrition - Adult 1 Each TPN Continuous <Continuous>  Parenteral Nutrition - Adult 1 Each TPN Continuous <Continuous>      Allergies:penicillin (Angioedema)        Labs:                           7.1    14.28 )-----------( 240      ( 11 Sep 2023 04:47 )             22.7     PT/INR - ( 11 Sep 2023 04:47 )   PT: 13.8 sec;   INR: 1.22          PTT - ( 11 Sep 2023 04:47 )  PTT:32.0 sec  09-11    136  |  103  |  28<H>  ----------------------------<  138<H>  4.1   |  23  |  1.81<H>    Ca    8.2<L>      11 Sep 2023 04:47  Phos  4.2     09-11  Mg     2.0     09-11    TPro  7.7  /  Alb  2.1<L>  /  TBili  3.3<H>  /  DBili  2.4<H>  /  AST  56<H>  /  ALT  39  /  AlkPhos  161<H>  09-10           Imaging Findings: reviewed

## 2023-09-11 NOTE — PROGRESS NOTE ADULT - SUBJECTIVE AND OBJECTIVE BOX
INTERVAL/OVERNIGHT EVENTS: Trop 58 (57), no changes. Agitation: Restraints placed and Precedex started. Repeat Covid - Neg. IR Perc Deb ordered and preop done. Metop made IV given poor mental status. Agitation: started Precedex, then pulling lines and attempting OOB despite max Precedex--gave Haldol 2mg IV w improvement. MAPs 50s: on Levo gtt for 1 hour, [id number] ordered (but UOP 500cc and came off Levo) so bolus not given. Instead gave 1 U pRBC, for Hg 7.1 w pending IR    SUBJECTIVE:     POD #  6/27: Laparoscopic lysis of adhesions, converted to open lysis of adhesions, SBR x 3, temporary abdominal closure, 5L cryst, 1L 5% alb, 3 pRBC, 1 FFP, 1 plts  6/28: ex lap, removal of abthera, ileocolic resection, small bowel anastamosis, , 1.6 crystalloid, 250 5%, 175 uop   7/3: IR Gendel drained perihepatic aspiration of serous fluid.   7/5: RTOR exlap, washout, ileocolic resection with end ileostomy, blow hole colostomy, fistula, red rubber from ileostomy to small bowel anastomosis; vicryl bridging mesh; R JOYCE below ileostomy, L JOYCE at small bowel enterotomy repair; 500 LR, 500 5% albumin, 3u PRBC, 2 FFP, 400 UOP,   SICU Day #2    Neurologic Medications  dexMEDEtomidine Infusion 0.5 MICROgram(s)/kG/Hr IV Continuous <Continuous>  melatonin 5 milliGRAM(s) Oral at bedtime PRN Sleep    Respiratory Medications    Cardiovascular Medications  metoprolol tartrate Injectable 5 milliGRAM(s) IV Push every 6 hours  norepinephrine Infusion 0.05 MICROgram(s)/kG/Min IV Continuous <Continuous>    Gastrointestinal Medications  dextrose 5%. 1000 milliLiter(s) IV Continuous <Continuous>  dextrose 5%. 1000 milliLiter(s) IV Continuous <Continuous>  diphenoxylate/atropine 2 Tablet(s) Oral every 6 hours  lipid, fat emulsion (Fish Oil and Plant Based) 20% Infusion 0.99 Gm/kG/Day IV Continuous <Continuous>  loperamide 4 milliGRAM(s) Oral every 6 hours  pantoprazole  Injectable 40 milliGRAM(s) IV Push daily  Parenteral Nutrition - Adult 1 Each TPN Continuous <Continuous>    Genitourinary Medications    Hematologic/Oncologic Medications    Antimicrobial/Immunologic Medications  imipenem/cilastatin  IVPB 1000 milliGRAM(s) IV Intermittent every 8 hours    Endocrine/Metabolic Medications  cholestyramine Powder (Sugar-Free) 4 Gram(s) Oral two times a day  dextrose 50% Injectable 25 Gram(s) IV Push once  dextrose 50% Injectable 12.5 Gram(s) IV Push once  dextrose 50% Injectable 25 Gram(s) IV Push once  dextrose Oral Gel 15 Gram(s) Oral once PRN Blood Glucose LESS THAN 70 milliGRAM(s)/deciliter  glucagon  Injectable 1 milliGRAM(s) IntraMuscular once  insulin lispro (ADMELOG) corrective regimen sliding scale   SubCutaneous three times a day before meals  levothyroxine Injectable 40 MICROGram(s) IV Push <User Schedule>  octreotide  Injectable 100 MICROGram(s) IV Push every 8 hours    Topical/Other Medications  benzocaine/menthol Lozenge 2 Lozenge Oral every 4 hours PRN Sore Throat  chlorhexidine 2% Cloths 1 Application(s) Topical <User Schedule>  nystatin Powder 1 Application(s) Topical three times a day      MEDICATIONS  (PRN):  benzocaine/menthol Lozenge 2 Lozenge Oral every 4 hours PRN Sore Throat  dextrose Oral Gel 15 Gram(s) Oral once PRN Blood Glucose LESS THAN 70 milliGRAM(s)/deciliter  melatonin 5 milliGRAM(s) Oral at bedtime PRN Sleep      I&O's Detail    09 Sep 2023 07:01  -  10 Sep 2023 07:00  --------------------------------------------------------  IN:    Fat Emulsion (Fish Oil &amp; Plant Based) 20% Infusion: 521 mL    Oral Fluid: 500 mL    Sodium Chloride 0.9% Bolus: 1500 mL    TPN (Total Parenteral Nutrition): 2392 mL  Total IN: 4913 mL    OUT:    Bulb (mL): 90 mL    Drain (mL): 250 mL    Drain (mL): 175 mL    Ileostomy (mL): 10 mL    VAC (Vacuum Assisted Closure) System (mL): 225 mL    Voided (mL): 2575 mL  Total OUT: 3325 mL    Total NET: 1588 mL      10 Sep 2023 07:01  -  11 Sep 2023 06:25  --------------------------------------------------------  IN:    Dexmedetomidine: 181.8 mL    Fat Emulsion (Fish Oil &amp; Plant Based) 20% Infusion: 166.4 mL    Fat Emulsion (Fish Oil &amp; Plant Based) 20% Infusion: 499.2 mL    IV PiggyBack: 500 mL    Norepinephrine: 11.4 mL    Sodium Chloride 0.9% Bolus: 2000 mL    TPN (Total Parenteral Nutrition): 2288 mL  Total IN: 5646.8 mL    OUT:    Bulb (mL): 30 mL    Drain (mL): 215 mL    Drain (mL): 300 mL    Ileostomy (mL): 15 mL    VAC (Vacuum Assisted Closure) System (mL): 315 mL    Voided (mL): 2100 mL  Total OUT: 2975 mL    Total NET: 2671.8 mL          Vital Signs Last 24 Hrs  T(C): 36.3 (11 Sep 2023 05:09), Max: 36.9 (10 Sep 2023 08:55)  T(F): 97.4 (11 Sep 2023 05:09), Max: 98.5 (10 Sep 2023 08:55)  HR: 73 (11 Sep 2023 04:00) (72 - 120)  BP: 122/59 (11 Sep 2023 04:00) (79/40 - 227/90)  BP(mean): 85 (11 Sep 2023 04:00) (54 - 129)  RR: 16 (11 Sep 2023 04:00) (16 - 43)  SpO2: 100% (11 Sep 2023 04:00) (90% - 100%)    Parameters below as of 11 Sep 2023 04:00  Patient On (Oxygen Delivery Method): nasal cannula    O2 Concentration (%): 2    GENERAL: NAD, resting comfortably in bed  HEENT: NCAT, MMM  C/V: Normal rate, normal peripheral perfusion  PULM: Nonlabored breathing, no respiratory distress,   ABD: Soft, ND, NT, no rebound tenderness, no guarding  EXTREM: WWP, no edema, SCDs in place  NEURO: No focal deficits    LABS:                        7.1    14.28 )-----------( 240      ( 11 Sep 2023 04:47 )             22.7     09-11    136  |  103  |  28<H>  ----------------------------<  138<H>  4.1   |  23  |  1.81<H>    Ca    8.2<L>      11 Sep 2023 04:47  Phos  4.2     09-11  Mg     2.0     09-11    TPro  7.7  /  Alb  2.1<L>  /  TBili  3.3<H>  /  DBili  2.4<H>  /  AST  56<H>  /  ALT  39  /  AlkPhos  161<H>  09-10    PT/INR - ( 11 Sep 2023 04:47 )   PT: 13.8 sec;   INR: 1.22          PTT - ( 11 Sep 2023 04:47 )  PTT:32.0 sec  Urinalysis Basic - ( 11 Sep 2023 04:47 )    Color: x / Appearance: x / SG: x / pH: x  Gluc: 138 mg/dL / Ketone: x  / Bili: x / Urobili: x   Blood: x / Protein: x / Nitrite: x   Leuk Esterase: x / RBC: x / WBC x   Sq Epi: x / Non Sq Epi: x / Bacteria: x        RADIOLOGY & ADDITIONAL STUDIES:      Culture - Blood (collected 09-10-23 @ 05:19)  Source: .Blood Blood  Preliminary Report (09-11-23 @ 06:00):    No growth at 1 day.    Culture - Blood (collected 09-10-23 @ 05:19)  Source: .Blood Blood  Preliminary Report (09-11-23 @ 06:00):    No growth at 1 day.    Urinalysis with Rflx Culture (collected 09-09-23 @ 16:13)     INTERVAL/OVERNIGHT EVENTS:   ·	Trop 58 (57), no changes.   ·	Agitation: Restraints placed and Precedex started.  Agitation: started Precedex, then pulling lines and attempting OOB despite max Precedex--gave Haldol 2mg IV w improvement. MAPs 50s: on Levo gtt for 1 hour, [id number] ordered (but UOP 500cc and came off Levo) so bolus not given. Instead gave 1 U pRBC, for Hg 7.1 w pending IR  ·	Repeat Covid - Neg.  ·	 IR Perc Deb ordered and preop done.   ·	Metop made IV given poor mental status.     SUBJECTIVE: Patient seen at bedside. He is happy he is starting to remember faces and names. He denies any current hallucinations. He denies any pain or shortness of breath. He reports he is feeling well and is hopeful.     POD #  6/27: Laparoscopic lysis of adhesions, converted to open lysis of adhesions, SBR x 3, temporary abdominal closure, 5L cryst, 1L 5% alb, 3 pRBC, 1 FFP, 1 plts  6/28: ex lap, removal of abthera, ileocolic resection, small bowel anastamosis, , 1.6 crystalloid, 250 5%, 175 uop   7/3: IR Gendel drained perihepatic aspiration of serous fluid.   7/5: RTOR exlap, washout, ileocolic resection with end ileostomy, blow hole colostomy, fistula, red rubber from ileostomy to small bowel anastomosis; vicryl bridging mesh; R JOYCE below ileostomy, L JOYCE at small bowel enterotomy repair; 500 LR, 500 5% albumin, 3u PRBC, 2 FFP, 400 UOP,   SICU Day #2    Neurologic Medications  dexMEDEtomidine Infusion 0.5 MICROgram(s)/kG/Hr IV Continuous <Continuous>  melatonin 5 milliGRAM(s) Oral at bedtime PRN Sleep    Respiratory Medications    Cardiovascular Medications  metoprolol tartrate Injectable 5 milliGRAM(s) IV Push every 6 hours  norepinephrine Infusion 0.05 MICROgram(s)/kG/Min IV Continuous <Continuous>    Gastrointestinal Medications  dextrose 5%. 1000 milliLiter(s) IV Continuous <Continuous>  dextrose 5%. 1000 milliLiter(s) IV Continuous <Continuous>  diphenoxylate/atropine 2 Tablet(s) Oral every 6 hours  lipid, fat emulsion (Fish Oil and Plant Based) 20% Infusion 0.99 Gm/kG/Day IV Continuous <Continuous>  loperamide 4 milliGRAM(s) Oral every 6 hours  pantoprazole  Injectable 40 milliGRAM(s) IV Push daily  Parenteral Nutrition - Adult 1 Each TPN Continuous <Continuous>    Genitourinary Medications    Hematologic/Oncologic Medications    Antimicrobial/Immunologic Medications  imipenem/cilastatin  IVPB 1000 milliGRAM(s) IV Intermittent every 8 hours    Endocrine/Metabolic Medications  cholestyramine Powder (Sugar-Free) 4 Gram(s) Oral two times a day  dextrose 50% Injectable 25 Gram(s) IV Push once  dextrose 50% Injectable 12.5 Gram(s) IV Push once  dextrose 50% Injectable 25 Gram(s) IV Push once  dextrose Oral Gel 15 Gram(s) Oral once PRN Blood Glucose LESS THAN 70 milliGRAM(s)/deciliter  glucagon  Injectable 1 milliGRAM(s) IntraMuscular once  insulin lispro (ADMELOG) corrective regimen sliding scale   SubCutaneous three times a day before meals  levothyroxine Injectable 40 MICROGram(s) IV Push <User Schedule>  octreotide  Injectable 100 MICROGram(s) IV Push every 8 hours    Topical/Other Medications  benzocaine/menthol Lozenge 2 Lozenge Oral every 4 hours PRN Sore Throat  chlorhexidine 2% Cloths 1 Application(s) Topical <User Schedule>  nystatin Powder 1 Application(s) Topical three times a day      MEDICATIONS  (PRN):  benzocaine/menthol Lozenge 2 Lozenge Oral every 4 hours PRN Sore Throat  dextrose Oral Gel 15 Gram(s) Oral once PRN Blood Glucose LESS THAN 70 milliGRAM(s)/deciliter  melatonin 5 milliGRAM(s) Oral at bedtime PRN Sleep      I&O's Detail    09 Sep 2023 07:01  -  10 Sep 2023 07:00  --------------------------------------------------------  IN:    Fat Emulsion (Fish Oil &amp; Plant Based) 20% Infusion: 521 mL    Oral Fluid: 500 mL    Sodium Chloride 0.9% Bolus: 1500 mL    TPN (Total Parenteral Nutrition): 2392 mL  Total IN: 4913 mL    OUT:    Bulb (mL): 90 mL    Drain (mL): 250 mL    Drain (mL): 175 mL    Ileostomy (mL): 10 mL    VAC (Vacuum Assisted Closure) System (mL): 225 mL    Voided (mL): 2575 mL  Total OUT: 3325 mL    Total NET: 1588 mL      10 Sep 2023 07:01  -  11 Sep 2023 06:25  --------------------------------------------------------  IN:    Dexmedetomidine: 181.8 mL    Fat Emulsion (Fish Oil &amp; Plant Based) 20% Infusion: 166.4 mL    Fat Emulsion (Fish Oil &amp; Plant Based) 20% Infusion: 499.2 mL    IV PiggyBack: 500 mL    Norepinephrine: 11.4 mL    Sodium Chloride 0.9% Bolus: 2000 mL    TPN (Total Parenteral Nutrition): 2288 mL  Total IN: 5646.8 mL    OUT:    Bulb (mL): 30 mL    Drain (mL): 215 mL    Drain (mL): 300 mL    Ileostomy (mL): 15 mL    VAC (Vacuum Assisted Closure) System (mL): 315 mL    Voided (mL): 2100 mL  Total OUT: 2975 mL    Total NET: 2671.8 mL          Vital Signs Last 24 Hrs  T(C): 36.3 (11 Sep 2023 05:09), Max: 36.9 (10 Sep 2023 08:55)  T(F): 97.4 (11 Sep 2023 05:09), Max: 98.5 (10 Sep 2023 08:55)  HR: 73 (11 Sep 2023 04:00) (72 - 120)  BP: 122/59 (11 Sep 2023 04:00) (79/40 - 227/90)  BP(mean): 85 (11 Sep 2023 04:00) (54 - 129)  RR: 16 (11 Sep 2023 04:00) (16 - 43)  SpO2: 100% (11 Sep 2023 04:00) (90% - 100%)    Parameters below as of 11 Sep 2023 04:00  Patient On (Oxygen Delivery Method): nasal cannula    O2 Concentration (%): 2    GENERAL: NAD, resting comfortably in bed, with EEG cap in place   HEENT: NCAT, MMM  C/V: Normal rate and rhythm, no murmurs appreciated   PULM: Nonlabored breathing, no respiratory distress, clear lungs bilaterally   ABD: Soft, ND, NT, vac in place and in suction, fistula bag without in noticeable leaks with dark green brown fluid in bag, ostomy bag with red rubber to suction in place with minimal output  EXTREM: WWP, no edema, SCDs in place  NEURO: No focal deficits, alert and oriented to place, time, self, and situation at time of interview   Skin: non blanching macule grouping around bilateral arms, kelfex wrapped around IVs, and skin abrasions from patient pulling overnight, clean and dry    LABS:                        7.1    14.28 )-----------( 240      ( 11 Sep 2023 04:47 )             22.7     09-11    136  |  103  |  28<H>  ----------------------------<  138<H>  4.1   |  23  |  1.81<H>    Ca    8.2<L>      11 Sep 2023 04:47  Phos  4.2     09-11  Mg     2.0     09-11    TPro  7.7  /  Alb  2.1<L>  /  TBili  3.3<H>  /  DBili  2.4<H>  /  AST  56<H>  /  ALT  39  /  AlkPhos  161<H>  09-10    PT/INR - ( 11 Sep 2023 04:47 )   PT: 13.8 sec;   INR: 1.22          PTT - ( 11 Sep 2023 04:47 )  PTT:32.0 sec  Urinalysis Basic - ( 11 Sep 2023 04:47 )    Color: x / Appearance: x / SG: x / pH: x  Gluc: 138 mg/dL / Ketone: x  / Bili: x / Urobili: x   Blood: x / Protein: x / Nitrite: x   Leuk Esterase: x / RBC: x / WBC x   Sq Epi: x / Non Sq Epi: x / Bacteria: x        RADIOLOGY & ADDITIONAL STUDIES:      Culture - Blood (collected 09-10-23 @ 05:19)  Source: .Blood Blood  Preliminary Report (09-11-23 @ 06:00):    No growth at 1 day.    Culture - Blood (collected 09-10-23 @ 05:19)  Source: .Blood Blood  Preliminary Report (09-11-23 @ 06:00):    No growth at 1 day.    Urinalysis with Rflx Culture (collected 09-09-23 @ 16:13)

## 2023-09-11 NOTE — PROGRESS NOTE ADULT - NSPROGADDITIONALINFOA_GEN_ALL_CORE
78M PMH Crohns disease, prolonged hospitalization following SBR for SBO c/b feculent peritonitis s/p RTOR for ileocolic resection 7/5, now stepped back up to SICU given AMS likely 2/2 associated hypoglycemia and GB distention on imaging that is suggestive of acalculous cholecystitis although has been present on imaging earlier in admission, s/p perc cony draining 180cc bilious fluid, currently on imipenem/cilastin     -F/u biliary fluid for gram stain/culture  -F/u bcx   -Change imipenem/cilastin to 500 mg Q8H IV   -Team 1 will follow along

## 2023-09-11 NOTE — PROGRESS NOTE ADULT - SUBJECTIVE AND OBJECTIVE BOX
STATUS POST:  Exploratory laparotomy    Laparoscopic lysis of intestinal adhesions    Exploratory laparotomy    Open lysis of intestinal adhesions    Resection of small bowel    Temporary closure of abdominal cavity    Repair, anastomosis, ileocolic    Small bowel resection with anastomosis    Flap, myocutaneous, abdominal wall    Reconstruction of abdominal wall using mesh    Washout of abdominal cavity    Creation of ileostomy    Creation of colostomy    Laparoscopic lysis of intestinal adhesions    Open lysis of intestinal adhesions    Resection of small bowel    Temporary closure of abdominal cavity    Repair, anastomosis, ileocolic    Small bowel resection with anastomosis    Flap, myocutaneous, abdominal wall    Reconstruction of abdominal wall using mesh    Washout of abdominal cavity      SUBJECTIVE: Pt seen and examined at the bedside by surgical team. Pt oriented well, vac in place.     Vital Signs Last 24 Hrs  T(C): 36.3 (11 Sep 2023 05:09), Max: 36.9 (10 Sep 2023 08:55)  T(F): 97.4 (11 Sep 2023 05:09), Max: 98.5 (10 Sep 2023 08:55)  HR: 73 (11 Sep 2023 07:45) (72 - 120)  BP: 99/56 (11 Sep 2023 07:45) (79/40 - 227/90)  BP(mean): 74 (11 Sep 2023 07:45) (54 - 129)  RR: 18 (11 Sep 2023 07:45) (16 - 43)  SpO2: 98% (11 Sep 2023 07:45) (90% - 100%)    Parameters below as of 11 Sep 2023 07:45  Patient On (Oxygen Delivery Method): nasal cannula    O2 Concentration (%): 2    I&O's Summary    10 Sep 2023 07:01  -  11 Sep 2023 07:00  --------------------------------------------------------  IN: 5646.8 mL / OUT: 2975 mL / NET: 2671.8 mL        Physical Exam:  General Appearance: Appears well, NAD  Pulmonary: Nonlabored breathing, no respiratory distress  Cardiovascular: NSR  Abdomen: Soft, nondisteded, appropriate incisional tenderness, dressings clean and dry and intact  Extremities: WWP, SCD's in place     LABS:                        7.1    14.28 )-----------( 240      ( 11 Sep 2023 04:47 )             22.7     09-11    136  |  103  |  28<H>  ----------------------------<  138<H>  4.1   |  23  |  1.81<H>    Ca    8.2<L>      11 Sep 2023 04:47  Phos  4.2     09-11  Mg     2.0     09-11    TPro  7.7  /  Alb  2.1<L>  /  TBili  3.3<H>  /  DBili  2.4<H>  /  AST  56<H>  /  ALT  39  /  AlkPhos  161<H>  09-10    PT/INR - ( 11 Sep 2023 04:47 )   PT: 13.8 sec;   INR: 1.22          PTT - ( 11 Sep 2023 04:47 )  PTT:32.0 sec  Urinalysis Basic - ( 11 Sep 2023 04:47 )    Color: x / Appearance: x / SG: x / pH: x  Gluc: 138 mg/dL / Ketone: x  / Bili: x / Urobili: x   Blood: x / Protein: x / Nitrite: x   Leuk Esterase: x / RBC: x / WBC x   Sq Epi: x / Non Sq Epi: x / Bacteria: x

## 2023-09-11 NOTE — EEG REPORT - NS EEG TEXT BOX
[facility] Department of Neurology  Inpatient Continuous video-Electroencephalogram    Patient Name:	LARRY KELLY    :	1945  MRN:	[id number]    Study Start Date/Time:  9/10/2023, 10:11:12 AM  Study End Date/Time: in progress    Referred by: Dr. Peterson    Brief Clinical History:  LARRY KELLY is a 78 year old Male with AMS; study performed to investigate for seizures or markers of epilepsy.    Diagnosis Code:   R56.9 convulsions/seizure  The live video was: unmonitored.    Pertinent Medications:  n/a    Acquisition Details:  Electroencephalography was acquired using a minimum of 21 channels on an [facility] system v 9.3.1 with electrode placement according to the standard International 10-20 system following ACNS (American Clinical Neurophysiology Society) guidelines for Long-Term Video EEG monitoring.  Anterior temporal T1 and T2 electrodes were utilized whenever possible.   The [employer] automated spike & seizure detections were all reviewed in detail, in addition to extensive portions of raw EEG.      Day 1: 9/10/2023 @ 10:11:12 AM to next morning @ 07:00 am  Background:  continuous, with predominantly alpha and theta frequencies.  Symmetry:  No persistent asymmetries of voltage or frequency.  Posterior Dominant Rhythm:  8.5hz,  symmetric, well-organized, and well-modulated.  Organization: Appropriate anterior-posterior gradient.  Voltage:  Normal (20+ uV)  Variability: Yes. 		Reactivity: Yes.  N2 sleep: Symmetric, synchronous spindles and K complexes.  Spontaneous Activity:  No epileptiform discharges.  Periodic/rhythmic activity:  None  Events:  No electrographic seizures or significant clinical events.  Provocations:  Hyperventilation and Photic stimulation: was not performed.    Daily Summary:    Continuous  Mild generalized  background slowing suggestive of a similar degree of diffuse or multifocal  dysfunction.  No epileptiform activity and no significant clinical events occurred.     Given re-assuring EEG, if low suspicion for seizure, would consider disconnect EEG.    Alejandrina Eid DO  Attending Neurologist, [facility]

## 2023-09-11 NOTE — PROGRESS NOTE ADULT - ATTENDING COMMENTS
I was physically present for the key portions of the evaluation and managemnent (E/M) service provided.  I agree with the above history, physical, and plan which I have reviewed and edited where appropriate, with the exceptions as per my note.    pt seen and examined.    he is entirely lucid with a normal neurological exam. he describes the prior events and has insight that his hallucinations were not real, though he remembers they were distressing at the time.      AP: unclear etiology, likely delirium and sundowning given profound improvement during the day. try low dose seroquel as a preventative at 6pm (delirium began at ~7pm in the past). rest of management as above and as per primary team.

## 2023-09-11 NOTE — PROGRESS NOTE ADULT - SUBJECTIVE AND OBJECTIVE BOX
events of the weekend noted  still altered  VSS  afebrile  outputs stable  elytes stable    receiving PRBC  EEG ongoing  CT abd/chest noted  ON IVAB  For Perc Deb

## 2023-09-11 NOTE — PROGRESS NOTE ADULT - ASSESSMENT
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 EEG didn't reveal any seizure and caputred the halluciantion event. His presentation is mostly consistent with ICU delirium although cannot rule out neurodegenerative condition like early stage of LBD in this acute setting.     Recommendations:   - Recommend Starting Seroquel 12.5 mg at 6 pm .  - Provide strong environmental cues to maintain normal sleep/wake cycle; Daytime - frequent verbal engagement and reorientation, full light in room, encourage family visits, make sure patient has seeing eyeglasses/hearing aids; NightTime - reduce light noise, avoid non-essential procedures between midnight and 6AM.  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 EEG didn't reveal any seizure and captured the hallucination event. His presentation is mostly consistent with ICU delirium although cannot rule out neurodegenerative condition like early stage of LBD in this acute setting.     Recommendations:   - Recommend Starting Seroquel 12.5 mg at 6 pm to prevent sundowning.  - Provide strong environmental cues to maintain normal sleep/wake cycle; Daytime - frequent verbal engagement and reorientation, full light in room, encourage family visits, make sure patient has seeing eyeglasses/hearing aids; NightTime - reduce light noise, avoid non-essential procedures between midnight and 6AM.  - Continue melatonin to promote sleep-wake cycle

## 2023-09-11 NOTE — PROGRESS NOTE ADULT - ASSESSMENT
78 M w/ Crohn's, AFib/Flutter s/p DCCVs on amiodarone, remote ileocectomy and open appendectomy. Admitted (6/23) for SBO vs Crohns flare, s/p NGT decompression and s/p lap TRE converted to open TRE, SBR x 3, left in discontinuity with abthera vac on (6/27), RTOR for ileocolic resection, small bowel anastomosis, and abdominal wall closure on (6/28), c/b fluid collection s/p IR aspiration of perihepatic fluid on (7/3), c/b wound dehiscence s/p RTOR exlap, washout, ileocolic resection with end ileostomy, blow hole colostomy, red rubber from ileostomy to small bowel anastomosis; vicryl bridging mesh on (7/5) transferred to SICU postoperatively for hemodynamic monitoring, with hospital course complicated by periods of AMS most recently on 9/1 and associated with oliguria, severe ELVI and hyponatremia, which resolved but now stepped back up for to SICU on 9/10 for acute AMS, intermittent hypoglycemia, AFib with RVR.     Perc Deb Today  F/U EEG  Continue with IV Abx per ID  Care per primary team and SICU  Surgery Team 4C will continue to follow. Please page Team 4 with questions/clinical changes. [phone]

## 2023-09-12 NOTE — PROGRESS NOTE ADULT - ASSESSMENT
78M w/ Crohn's, AFib/Flutter s/p DCCVs on amiodarone, remote ileocectomy and open appy here for SBO vs Crohns flare, s/p NGT decompression and now s/p lap TRE converted to open TRE, SBR x 3, left in discontinuity with abthera vac on 6/27, RTOR for ileocolic resection, small bowel anastomosis, and abdominal wall closure on 6/28, and s/p IR aspiration of perihepatic fluid on 7/3, stepped down to telemetry on 7/4. wound dehiscence on 7/5, RTOR ex-lap, washout, ileocolic resection with end ileostomy, blow hole colostomy, red rubber from ileostomy to small bowel anastomosis; Vicryl bridging mesh on 7/5. Transferred to SICU post op for HD monitoring. Extubated 7/6. Surgical wound with decreasing in size and granulating with Vac. Tentative plan for skin graft per Plastics; timing TBD pending proper wound shrinkage and granulation   8/23: Upgraded to SICU 8/23 for acute delirium and tremors, r/o sepsis vs metabolic encephalopathy. Ammonia levels normal    8/25: Spiked temp to 101.3 overnight w/ new leukocytosis to 14   8/28: Downgraded from SICU. No growth from blood cultures. Abx x 5 days until 8/29 8/30: Leukocytosis resolved   9/1: Decreased UOP, soft pressures. Cr 3.08 from 1.12. C/f dehydration 2/2 high fistula output vs ELVI   9/2: Upgraded to SICU for worsening ELVI. Stepped down 9/6   9/10: Upgraded to SICU for AMS, hypoglycemia and A-fib.   9/11: In setting of transaminitis, hyperbilirubinemia, leukocytosis and GB distention, underwent IR perc cony     Leukocytosis improving. Stable transaminitis and hyperbilirubinemia. Hemodynamically normal and afebrile. Low-to-moderate output from fistula. Improved mental status      Plan:   - Appreciate ID clarification on need for Abx and duration   - Monitor LFT trend   - NPO/IVF/TPN   - Wound vac change Wednesday vs Thursday   - Monitor output from drains; replete PRN for high output    - PRN pain and nausea control   - Hx of A-fib/flutter; in NSR on rate control. Appreciate Cards/EP input   - DVT ppx   - OOB/PT  - Tentative plan for skin graft per Plastics; timing TBD pending proper wound granulation     D/w SICU, chief resident and attending

## 2023-09-12 NOTE — PROGRESS NOTE ADULT - ASSESSMENT
78M PMH Crohns disease, prolonged hospitalization following SBR for SBO c/b feculent peritonitis s/p RTOR for ileocolic resection 7/5, now stepped back up to SICU given AMS likely 2/2 associated hypoglycemia (since resolved) and GB distention on imaging that is suggestive of acalculous cholecystitis although has been present on imaging earlier in admission, s/p perc cony draining 180cc bilious fluid, currently on imipenem/cilastin, biliary cx negative growth to date, improving significantly     -F/u biliary fluid for gram stain/culture  -f/u bcx   -Continue imipenem/cilastin 500 mg Q8H   -Team 1 will follow along

## 2023-09-12 NOTE — PROGRESS NOTE ADULT - SUBJECTIVE AND OBJECTIVE BOX
SUBJECTIVE:   Patient seen and examined at bedside this AM s/p perc cony 9/11  Mental status improving. No acute overnight events   Patient has no specific complaints or concerns   Stool per ostomy & fistula   Voiding spontaneously       MEDICATIONS  (STANDING):  chlorhexidine 2% Cloths 1 Application(s) Topical <User Schedule>  cholestyramine Powder (Sugar-Free) 4 Gram(s) Oral two times a day  dextrose 5%. 1000 milliLiter(s) (100 mL/Hr) IV Continuous <Continuous>  dextrose 5%. 1000 milliLiter(s) (50 mL/Hr) IV Continuous <Continuous>  dextrose 50% Injectable 25 Gram(s) IV Push once  dextrose 50% Injectable 12.5 Gram(s) IV Push once  dextrose 50% Injectable 25 Gram(s) IV Push once  diphenoxylate/atropine 2 Tablet(s) Oral every 6 hours  glucagon  Injectable 1 milliGRAM(s) IntraMuscular once  heparin   Injectable 5000 Unit(s) SubCutaneous every 8 hours  imipenem/cilastatin  IVPB 500 milliGRAM(s) IV Intermittent every 8 hours  insulin lispro (ADMELOG) corrective regimen sliding scale   SubCutaneous every 6 hours  levothyroxine Injectable 40 MICROGram(s) IV Push <User Schedule>  lipid, fat emulsion (Fish Oil and Plant Based) 20% Infusion 1 Gm/kG/Day (42.1 mL/Hr) IV Continuous <Continuous>  loperamide 4 milliGRAM(s) Oral every 6 hours  metoprolol tartrate Injectable 5 milliGRAM(s) IV Push every 6 hours  nystatin Powder 1 Application(s) Topical three times a day  octreotide  Injectable 100 MICROGram(s) IV Push every 8 hours  pantoprazole  Injectable 40 milliGRAM(s) IV Push daily  Parenteral Nutrition - Adult 1 Each (104 mL/Hr) TPN Continuous <Continuous>  QUEtiapine 25 milliGRAM(s) Oral at bedtime    MEDICATIONS  (PRN):  benzocaine/menthol Lozenge 2 Lozenge Oral every 4 hours PRN Sore Throat  dextrose Oral Gel 15 Gram(s) Oral once PRN Blood Glucose LESS THAN 70 milliGRAM(s)/deciliter  melatonin 5 milliGRAM(s) Oral at bedtime PRN Sleep      Vital Signs Last 24 Hrs  T(C): 36.6 (12 Sep 2023 05:19), Max: 36.6 (12 Sep 2023 05:19)  T(F): 97.8 (12 Sep 2023 05:19), Max: 97.8 (12 Sep 2023 05:19)  HR: 75 (12 Sep 2023 07:00) (70 - 76)  BP: 124/60 (12 Sep 2023 07:00) (112/62 - 150/66)  BP(mean): 86 (12 Sep 2023 07:00) (76 - 108)  RR: 18 (12 Sep 2023 07:00) (13 - 28)  SpO2: 95% (12 Sep 2023 07:00) (92% - 100%)    Parameters below as of 12 Sep 2023 07:00  Patient On (Oxygen Delivery Method): room air        Physical Exam:  General: NAD, resting comfortably in bed  C/V: NSR  Pulm: Nonlabored breathing, no respiratory distress  Abd: soft, wound vac in place with adequate seal and suction (50/200 cc), Poole drain to LIS in fistula (50/200 cc) with surrounding wound manager to gravity (150/565 cc), JOYCE drain in left abdomen (50/65), ostomy pink & patent (0/0 cc), perc cony drain (200/320 cc)  : Adequate UOP (950/2900 cc)  Extrem: WWP, no edema, SCDs in place   Neuro: A&Ox4; no focal deficits     I&O's Summary    11 Sep 2023 07:01  -  12 Sep 2023 07:00  --------------------------------------------------------  IN: 3965.4 mL / OUT: 4250 mL / NET: -284.6 mL        LABS:                        8.6    13.17 )-----------( 227      ( 12 Sep 2023 05:39 )             26.1     09-12    136  |  105  |  25<H>  ----------------------------<  104<H>  3.3<L>   |  24  |  1.67<H>    Ca    8.1<L>      12 Sep 2023 05:39  Phos  3.7     09-12  Mg     2.0     09-12    TPro  7.3  /  Alb  1.8<L>  /  TBili  3.2<H>  /  DBili  2.3<H>  /  AST  53<H>  /  ALT  39  /  AlkPhos  159<H>  09-12    PT/INR - ( 11 Sep 2023 04:47 )   PT: 13.8 sec;   INR: 1.22          PTT - ( 11 Sep 2023 04:47 )  PTT:32.0 sec  Urinalysis Basic - ( 12 Sep 2023 05:39 )    Color: x / Appearance: x / SG: x / pH: x  Gluc: 104 mg/dL / Ketone: x  / Bili: x / Urobili: x   Blood: x / Protein: x / Nitrite: x   Leuk Esterase: x / RBC: x / WBC x   Sq Epi: x / Non Sq Epi: x / Bacteria: x      CAPILLARY BLOOD GLUCOSE      POCT Blood Glucose.: 100 mg/dL (12 Sep 2023 05:35)  POCT Blood Glucose.: 106 mg/dL (12 Sep 2023 00:07)  POCT Blood Glucose.: 105 mg/dL (11 Sep 2023 17:18)  POCT Blood Glucose.: 127 mg/dL (11 Sep 2023 11:50)    LIVER FUNCTIONS - ( 12 Sep 2023 05:39 )  Alb: 1.8 g/dL / Pro: 7.3 g/dL / ALK PHOS: 159 U/L / ALT: 39 U/L / AST: 53 U/L / GGT: x             RADIOLOGY & ADDITIONAL STUDIES:

## 2023-09-12 NOTE — PROGRESS NOTE ADULT - ASSESSMENT
78 M w/ Crohn's, AFib/Flutter s/p DCCVs on amiodarone, remote ileocectomy and open appendectomy. Admitted (6/23) for SBO vs Crohns flare, s/p NGT decompression and s/p lap TRE converted to open TRE, SBR x 3, left in discontinuity with abthera vac on (6/27), RTOR for ileocolic resection, small bowel anastomosis, and abdominal wall closure on (6/28), c/b fluid collection s/p IR aspiration of perihepatic fluid on (7/3), c/b wound dehiscence s/p RTOR exlap, washout, ileocolic resection with end ileostomy, blow hole colostomy, red rubber from ileostomy to small bowel anastomosis; vicryl bridging mesh on (7/5) transferred to SICU postoperatively for hemodynamic monitoring, with hospital course complicated by periods of AMS most recently on 9/1 and associated with oliguria, severe ELVI and hyponatremia, which resolved but now stepped back up for to SICU on 9/10 for acute AMS, intermittent hypoglycemia, AFib with RVR.     Continue with IV Abx per ID  Care per primary team and SICU  Surgery Team 4C will continue to follow. Please page Team 4 with questions/clinical changes. [phone]

## 2023-09-12 NOTE — PROGRESS NOTE ADULT - ATTENDING COMMENTS
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

## 2023-09-12 NOTE — PROGRESS NOTE ADULT - SUBJECTIVE AND OBJECTIVE BOX
Status post percutaneous cholecystostomy  Bile so far negative for any growth  Outputs of fistulas stable  BUN/creatinine are decreasing  Hemoglobin and hematocrit responded appropriately to a single unit blood  Vital signs are stable  Afebrile  Abdomen soft nontender  Out of bed  Await neurology follow-up  ID follow-up  Okay to continue sips

## 2023-09-12 NOTE — PROGRESS NOTE ADULT - SUBJECTIVE AND OBJECTIVE BOX
78 M w/ Crohn's, AFib/Flutter s/p DCCVs on amiodarone, remote ileocectomy and open appendectomy. Admitted (6/23) for SBO vs Crohns flare, s/p NGT decompression and s/p lap TRE converted to open TRE, SBR x 3, left in discontinuity with abthera vac on (6/27), RTOR for ileocolic resection, small bowel anastomosis, and abdominal wall closure on (6/28), c/b fluid collection s/p IR aspiration of perihepatic fluid on (7/3), c/b wound dehiscence s/p RTOR exlap, washout, ileocolic resection with end ileostomy, blow hole colostomy, red rubber from ileostomy to small bowel anastomosis; vicryl bridging mesh on (7/5) transferred to SICU postoperatively for hemodynamic monitoring, with hospital course complicated by periods of AMS most recently on 9/1 and associated with oliguria, severe ELVI and hyponatremia, which resolved but now stepped back up for to SICU on 9/10 for acute AMS, intermittent hypoglycemia, AFib with RVR. Started on precedex for agitation overnight. CT abd 9/10 showing distended gallbladder with sludge. SP IR percutaneous cholecystostomy placement 9/11/23.    Perc cony: 25 cc bilious outpt/24 hr. Dressing cdi. Flushed easily with 3cc NS. Continue to monitor. IR will continue to follow. PRINCIPAL DISCHARGE DIAGNOSIS  Diagnosis: CAD (coronary artery disease)  Assessment and Plan of Treatment: continue aspirin   f/u with cardioly in 2 weeks

## 2023-09-12 NOTE — PROGRESS NOTE ADULT - SUBJECTIVE AND OBJECTIVE BOX
INTERVAL/OVERNIGHT EVENTS: mental status improved/at baseline    SUBJECTIVE:     POD #  6/27: Laparoscopic lysis of adhesions, converted to open lysis of adhesions, SBR x 3, temporary abdominal closure, 5L cryst, 1L 5% alb, 3 pRBC, 1 FFP, 1 plts  6/28: ex lap, removal of abthera, ileocolic resection, small bowel anastamosis, , 1.6 crystalloid, 250 5%, 175 uop   7/3: IR Gendel drained perihepatic aspiration of serous fluid.   7/5: RTOR exlap, washout, ileocolic resection with end ileostomy, blow hole colostomy, fistula, red rubber from ileostomy to small bowel anastomosis; vicryl bridging mesh; R JOYCE below ileostomy, L JOYCE at small bowel enterotomy repair; 500 LR, 500 5% albumin, 3u PRBC, 2 FFP, 400 UOP,   SICU Day #3    Neurologic Medications  melatonin 5 milliGRAM(s) Oral at bedtime PRN Sleep  QUEtiapine 25 milliGRAM(s) Oral at bedtime    Respiratory Medications    Cardiovascular Medications  metoprolol tartrate Injectable 5 milliGRAM(s) IV Push every 6 hours    Gastrointestinal Medications  dextrose 5%. 1000 milliLiter(s) IV Continuous <Continuous>  dextrose 5%. 1000 milliLiter(s) IV Continuous <Continuous>  diphenoxylate/atropine 2 Tablet(s) Oral every 6 hours  lipid, fat emulsion (Fish Oil and Plant Based) 20% Infusion 1 Gm/kG/Day IV Continuous <Continuous>  loperamide 4 milliGRAM(s) Oral every 6 hours  pantoprazole  Injectable 40 milliGRAM(s) IV Push daily  Parenteral Nutrition - Adult 1 Each TPN Continuous <Continuous>    Genitourinary Medications    Hematologic/Oncologic Medications  heparin   Injectable 5000 Unit(s) SubCutaneous every 8 hours    Antimicrobial/Immunologic Medications  imipenem/cilastatin  IVPB 500 milliGRAM(s) IV Intermittent every 8 hours    Endocrine/Metabolic Medications  cholestyramine Powder (Sugar-Free) 4 Gram(s) Oral two times a day  dextrose 50% Injectable 25 Gram(s) IV Push once  dextrose 50% Injectable 12.5 Gram(s) IV Push once  dextrose 50% Injectable 25 Gram(s) IV Push once  dextrose Oral Gel 15 Gram(s) Oral once PRN Blood Glucose LESS THAN 70 milliGRAM(s)/deciliter  glucagon  Injectable 1 milliGRAM(s) IntraMuscular once  insulin lispro (ADMELOG) corrective regimen sliding scale   SubCutaneous every 6 hours  levothyroxine Injectable 40 MICROGram(s) IV Push <User Schedule>  octreotide  Injectable 100 MICROGram(s) IV Push every 8 hours    Topical/Other Medications  benzocaine/menthol Lozenge 2 Lozenge Oral every 4 hours PRN Sore Throat  chlorhexidine 2% Cloths 1 Application(s) Topical <User Schedule>  nystatin Powder 1 Application(s) Topical three times a day      MEDICATIONS  (PRN):  benzocaine/menthol Lozenge 2 Lozenge Oral every 4 hours PRN Sore Throat  dextrose Oral Gel 15 Gram(s) Oral once PRN Blood Glucose LESS THAN 70 milliGRAM(s)/deciliter  melatonin 5 milliGRAM(s) Oral at bedtime PRN Sleep      I&O's Detail    10 Sep 2023 07:01  -  11 Sep 2023 07:00  --------------------------------------------------------  IN:    Dexmedetomidine: 181.8 mL    Fat Emulsion (Fish Oil &amp; Plant Based) 20% Infusion: 166.4 mL    Fat Emulsion (Fish Oil &amp; Plant Based) 20% Infusion: 499.2 mL    IV PiggyBack: 500 mL    Norepinephrine: 11.4 mL    Sodium Chloride 0.9% Bolus: 2000 mL    TPN (Total Parenteral Nutrition): 2288 mL  Total IN: 5646.8 mL    OUT:    Bulb (mL): 30 mL    Drain (mL): 215 mL    Drain (mL): 300 mL    Ileostomy (mL): 15 mL    VAC (Vacuum Assisted Closure) System (mL): 315 mL    Voided (mL): 2100 mL  Total OUT: 2975 mL    Total NET: 2671.8 mL      11 Sep 2023 07:01  -  12 Sep 2023 06:27  --------------------------------------------------------  IN:    Dexmedetomidine: 37.9 mL    Fat Emulsion (Fish Oil &amp; Plant Based) 20% Infusion: 547.3 mL    IV PiggyBack: 450 mL    Oral Fluid: 50 mL    RBC Washed Cells: 300 mL    TPN (Total Parenteral Nutrition): 2288 mL  Total IN: 3673.2 mL    OUT:    Bulb (mL): 65 mL    Drain (mL): 565 mL    Drain (mL): 140 mL    Drain (mL): 200 mL    VAC (Vacuum Assisted Closure) System (mL): 200 mL    Voided (mL): 2900 mL  Total OUT: 4070 mL    Total NET: -396.8 mL          Vital Signs Last 24 Hrs  T(C): 36.6 (12 Sep 2023 05:19), Max: 36.6 (12 Sep 2023 05:19)  T(F): 97.8 (12 Sep 2023 05:19), Max: 97.8 (12 Sep 2023 05:19)  HR: 76 (12 Sep 2023 05:00) (70 - 76)  BP: 133/63 (12 Sep 2023 05:00) (93/55 - 150/66)  BP(mean): 90 (12 Sep 2023 05:00) (69 - 108)  RR: 18 (12 Sep 2023 05:00) (13 - 24)  SpO2: 95% (12 Sep 2023 05:00) (92% - 100%)    Parameters below as of 12 Sep 2023 05:00  Patient On (Oxygen Delivery Method): room air        GENERAL: NAD, resting comfortably in bed  HEENT: NCAT, MMM  C/V: Normal rate, normal peripheral perfusion  PULM: Nonlabored breathing, no respiratory distress,   ABD: Soft, ND, NT, no rebound tenderness, no guarding  EXTREM: WWP, no edema, SCDs in place  NEURO: No focal deficits    LABS:                        8.6    13.17 )-----------( 227      ( 12 Sep 2023 05:39 )             26.1     09-12    136  |  105  |  25<H>  ----------------------------<  104<H>  3.3<L>   |  24  |  1.67<H>    Ca    8.1<L>      12 Sep 2023 05:39  Phos  3.7     09-12  Mg     2.0     09-12    TPro  7.3  /  Alb  1.8<L>  /  TBili  3.2<H>  /  DBili  2.3<H>  /  AST  53<H>  /  ALT  39  /  AlkPhos  159<H>  09-12    PT/INR - ( 11 Sep 2023 04:47 )   PT: 13.8 sec;   INR: 1.22          PTT - ( 11 Sep 2023 04:47 )  PTT:32.0 sec  Urinalysis Basic - ( 12 Sep 2023 05:39 )    Color: x / Appearance: x / SG: x / pH: x  Gluc: 104 mg/dL / Ketone: x  / Bili: x / Urobili: x   Blood: x / Protein: x / Nitrite: x   Leuk Esterase: x / RBC: x / WBC x   Sq Epi: x / Non Sq Epi: x / Bacteria: x        RADIOLOGY & ADDITIONAL STUDIES:      Culture - Body Fluid with Gram Stain (collected 09-11-23 @ 15:00)  Source: Bile bile from gallbladder  Gram Stain (09-11-23 @ 18:20):    No organisms seen    No WBC's seen.    Culture - Blood (collected 09-10-23 @ 05:19)  Source: .Blood Blood  Preliminary Report (09-12-23 @ 06:00):    No growth at 2 days.    Culture - Blood (collected 09-10-23 @ 05:19)  Source: .Blood Blood  Preliminary Report (09-12-23 @ 06:00):    No growth at 2 days.    Urinalysis with Rflx Culture (collected 09-09-23 @ 16:13)     INTERVAL/OVERNIGHT EVENTS: mental status improved/at baseline    SUBJECTIVE: Patient seen at bedside. Patient reports having a better night and was able to get some sleep. He denies any current pain or nausea.     POD #  6/27: Laparoscopic lysis of adhesions, converted to open lysis of adhesions, SBR x 3, temporary abdominal closure, 5L cryst, 1L 5% alb, 3 pRBC, 1 FFP, 1 plts  6/28: ex lap, removal of abthera, ileocolic resection, small bowel anastamosis, , 1.6 crystalloid, 250 5%, 175 uop   7/3: IR Gendel drained perihepatic aspiration of serous fluid.   7/5: RTOR exlap, washout, ileocolic resection with end ileostomy, blow hole colostomy, fistula, red rubber from ileostomy to small bowel anastomosis; vicryl bridging mesh; R JOYCE below ileostomy, L JOYCE at small bowel enterotomy repair; 500 LR, 500 5% albumin, 3u PRBC, 2 FFP, 400 UOP,   SICU Day #3    Neurologic Medications  melatonin 5 milliGRAM(s) Oral at bedtime PRN Sleep  QUEtiapine 25 milliGRAM(s) Oral at bedtime    Respiratory Medications    Cardiovascular Medications  metoprolol tartrate Injectable 5 milliGRAM(s) IV Push every 6 hours    Gastrointestinal Medications  dextrose 5%. 1000 milliLiter(s) IV Continuous <Continuous>  dextrose 5%. 1000 milliLiter(s) IV Continuous <Continuous>  diphenoxylate/atropine 2 Tablet(s) Oral every 6 hours  lipid, fat emulsion (Fish Oil and Plant Based) 20% Infusion 1 Gm/kG/Day IV Continuous <Continuous>  loperamide 4 milliGRAM(s) Oral every 6 hours  pantoprazole  Injectable 40 milliGRAM(s) IV Push daily  Parenteral Nutrition - Adult 1 Each TPN Continuous <Continuous>    Genitourinary Medications    Hematologic/Oncologic Medications  heparin   Injectable 5000 Unit(s) SubCutaneous every 8 hours    Antimicrobial/Immunologic Medications  imipenem/cilastatin  IVPB 500 milliGRAM(s) IV Intermittent every 8 hours    Endocrine/Metabolic Medications  cholestyramine Powder (Sugar-Free) 4 Gram(s) Oral two times a day  dextrose 50% Injectable 25 Gram(s) IV Push once  dextrose 50% Injectable 12.5 Gram(s) IV Push once  dextrose 50% Injectable 25 Gram(s) IV Push once  dextrose Oral Gel 15 Gram(s) Oral once PRN Blood Glucose LESS THAN 70 milliGRAM(s)/deciliter  glucagon  Injectable 1 milliGRAM(s) IntraMuscular once  insulin lispro (ADMELOG) corrective regimen sliding scale   SubCutaneous every 6 hours  levothyroxine Injectable 40 MICROGram(s) IV Push <User Schedule>  octreotide  Injectable 100 MICROGram(s) IV Push every 8 hours    Topical/Other Medications  benzocaine/menthol Lozenge 2 Lozenge Oral every 4 hours PRN Sore Throat  chlorhexidine 2% Cloths 1 Application(s) Topical <User Schedule>  nystatin Powder 1 Application(s) Topical three times a day      MEDICATIONS  (PRN):  benzocaine/menthol Lozenge 2 Lozenge Oral every 4 hours PRN Sore Throat  dextrose Oral Gel 15 Gram(s) Oral once PRN Blood Glucose LESS THAN 70 milliGRAM(s)/deciliter  melatonin 5 milliGRAM(s) Oral at bedtime PRN Sleep      I&O's Detail    10 Sep 2023 07:01  -  11 Sep 2023 07:00  --------------------------------------------------------  IN:    Dexmedetomidine: 181.8 mL    Fat Emulsion (Fish Oil &amp; Plant Based) 20% Infusion: 166.4 mL    Fat Emulsion (Fish Oil &amp; Plant Based) 20% Infusion: 499.2 mL    IV PiggyBack: 500 mL    Norepinephrine: 11.4 mL    Sodium Chloride 0.9% Bolus: 2000 mL    TPN (Total Parenteral Nutrition): 2288 mL  Total IN: 5646.8 mL    OUT:    Bulb (mL): 30 mL    Drain (mL): 215 mL    Drain (mL): 300 mL    Ileostomy (mL): 15 mL    VAC (Vacuum Assisted Closure) System (mL): 315 mL    Voided (mL): 2100 mL  Total OUT: 2975 mL    Total NET: 2671.8 mL      11 Sep 2023 07:01  -  12 Sep 2023 06:27  --------------------------------------------------------  IN:    Dexmedetomidine: 37.9 mL    Fat Emulsion (Fish Oil &amp; Plant Based) 20% Infusion: 547.3 mL    IV PiggyBack: 450 mL    Oral Fluid: 50 mL    RBC Washed Cells: 300 mL    TPN (Total Parenteral Nutrition): 2288 mL  Total IN: 3673.2 mL    OUT:    Bulb (mL): 65 mL    Drain (mL): 565 mL    Drain (mL): 140 mL    Drain (mL): 200 mL    VAC (Vacuum Assisted Closure) System (mL): 200 mL    Voided (mL): 2900 mL  Total OUT: 4070 mL    Total NET: -396.8 mL          Vital Signs Last 24 Hrs  T(C): 36.6 (12 Sep 2023 05:19), Max: 36.6 (12 Sep 2023 05:19)  T(F): 97.8 (12 Sep 2023 05:19), Max: 97.8 (12 Sep 2023 05:19)  HR: 76 (12 Sep 2023 05:00) (70 - 76)  BP: 133/63 (12 Sep 2023 05:00) (93/55 - 150/66)  BP(mean): 90 (12 Sep 2023 05:00) (69 - 108)  RR: 18 (12 Sep 2023 05:00) (13 - 24)  SpO2: 95% (12 Sep 2023 05:00) (92% - 100%)    Parameters below as of 12 Sep 2023 05:00  Patient On (Oxygen Delivery Method): room air        GENERAL: NAD, resting comfortably in bed  HEENT: NCAT, dry mucous membranes, improved from yesterday  C/V: Normal rate and ryhthm, normal peripheral perfusion  PULM: Nonlabored breathing, no respiratory distress, clear lungs bilaterally    ABD: Soft, ND, NT, vac in place with fistula reinforced and not leaking, ostomy bag covering blow hole with green output, leaking but reinforced, per drain clean and in place with green fluid in bag   EXTREM: WWP, no edema, SCDs in place  NEURO: No focal deficits, alert and oriented to time, place, situation, and person     LABS:                        8.6    13.17 )-----------( 227      ( 12 Sep 2023 05:39 )             26.1     09-12    136  |  105  |  25<H>  ----------------------------<  104<H>  3.3<L>   |  24  |  1.67<H>    Ca    8.1<L>      12 Sep 2023 05:39  Phos  3.7     09-12  Mg     2.0     09-12    TPro  7.3  /  Alb  1.8<L>  /  TBili  3.2<H>  /  DBili  2.3<H>  /  AST  53<H>  /  ALT  39  /  AlkPhos  159<H>  09-12    PT/INR - ( 11 Sep 2023 04:47 )   PT: 13.8 sec;   INR: 1.22          PTT - ( 11 Sep 2023 04:47 )  PTT:32.0 sec  Urinalysis Basic - ( 12 Sep 2023 05:39 )    Color: x / Appearance: x / SG: x / pH: x  Gluc: 104 mg/dL / Ketone: x  / Bili: x / Urobili: x   Blood: x / Protein: x / Nitrite: x   Leuk Esterase: x / RBC: x / WBC x   Sq Epi: x / Non Sq Epi: x / Bacteria: x        RADIOLOGY & ADDITIONAL STUDIES:      Culture - Body Fluid with Gram Stain (collected 09-11-23 @ 15:00)  Source: Bile bile from gallbladder  Gram Stain (09-11-23 @ 18:20):    No organisms seen    No WBC's seen.    Culture - Blood (collected 09-10-23 @ 05:19)  Source: .Blood Blood  Preliminary Report (09-12-23 @ 06:00):    No growth at 2 days.    Culture - Blood (collected 09-10-23 @ 05:19)  Source: .Blood Blood  Preliminary Report (09-12-23 @ 06:00):    No growth at 2 days.    Urinalysis with Rflx Culture (collected 09-09-23 @ 16:13)

## 2023-09-12 NOTE — PROGRESS NOTE ADULT - ASSESSMENT
78 M w/ Crohn's, AFib/Flutter s/p DCCVs on amiodarone, remote ileocectomy and open appendectomy. Admitted (6/23) for SBO vs Crohns flare, s/p NGT decompression and s/p lap TRE converted to open TRE, SBR x 3, left in discontinuity with abthera vac on (6/27), RTOR for ileocolic resection, small bowel anastomosis, and abdominal wall closure on (6/28), c/b fluid collection s/p IR aspiration of perihepatic fluid on (7/3), c/b wound dehiscence s/p RTOR exlap, washout, ileocolic resection with end ileostomy, blow hole colostomy, red rubber from ileostomy to small bowel anastomosis; vicryl bridging mesh on (7/5) transferred to SICU postoperatively for hemodynamic monitoring, with hospital course complicated by periods of AMS most recently on 9/1 and associated with oliguria, severe ELVI and hyponatremia, which resolved but now stepped back up for to SICU on 9/10 for acute AMS, intermittent hypoglycemia, AFib with RVR. Perc Choley tube placed on 9/11 for enlarged GB.     NEURO: Now with AMS and agitation: Cont Seroquel, off Precedex. EEG neg, follow Neurology recs. Hx of depression - effexor Dc'd on 9/10. Pain control with Dilaudid for vac changes only. Melatonin PRN.  HENT: Cepacol  CV: Clinically appears hypovolemic with dry mucus membranes. Episode of Afib with RVR, resolved with 5mg IVP Metoprolol, Hx Afib/flutter: s/p DCCV, Off amiodarone given transaminitis and elevated T bili; Continue Metoprolol 5q6 while AMS. New ST Depressions on EKG with AFib and Trop 56 - elevated trops due to Afib with RVR, ; Hx HTN - Holding Norvasc, Losartan. Hx HLD: hold Lipitor given LFTs. TTE  (7/18) - PASP 64mmHg, EF 65-70%,   PULM: Saturating well on RA.   GI/FEN: NPO with sips of gatorade; Imodium/Lomotril/Octretotide for high ostomy and ECF output. PICC/TPN; transaminitis of unknown origin, distended gallbladder on CT 8/25, RUQ US negative. Distended GB s/p Perc Choley. PPI.   : Voids, previous severe ELVI with BUN/Cr now downtrending, Repeat Ulytes and UA pending. Nephrology recommendations appreciated.   ENDO: mISS. Mckeon hypothyroid: IV Synthroid, TSH 2.180 (9.170), Recurrent Hypoglycemia on TPN  ID: Numerous SICU admissions from sepsis, most recently with concern for line sepsis. Now WBC dowtrended. Currently off abx. Imipenem (9/10--) // CTAP (8/25) without fluid collections, distended GB, atelectasis. Restart Imipenem (8/26--). Previously had C. tertium, Lactobacillis from his IR cx 7/3, and candida albicans, lactobacillus, vanc sensitive E faecium, vanc resistant E gallinarum, vanc resistant E casseliflavus, lactobacillus from his OR cx 7/5. Completed course of abx with imipenem (6/30-7/12, 7/23-7/30), Dapto (6/30-7/5 and 7/23-7/24). been off all abx since 7/30. empiric dapto (8/23-24) and cefepime (8/23-24).   PPX: SCDs, SQH   LINES: PIVs, LUE PICC (9/1 - )  WOUNDS/DRAINS: ECF abdominal wound with vac change Mon/Fri--next on 9/14. PT: Home health PT  DISPO: SICU   78 M w/ Crohn's, AFib/Flutter s/p DCCVs on amiodarone, remote ileocectomy and open appendectomy. Admitted (6/23) for SBO vs Crohns flare, s/p NGT decompression and s/p lap TRE converted to open TER, SBR x 3, left in discontinuity with abthera vac on (6/27), RTOR for ileocolic resection, small bowel anastomosis, and abdominal wall closure on (6/28), c/b fluid collection s/p IR aspiration of perihepatic fluid on (7/3), c/b wound dehiscence s/p RTOR exlap, washout, ileocolic resection with end ileostomy, blow hole colostomy, red rubber from ileostomy to small bowel anastomosis; vicryl bridging mesh on (7/5) transferred to SICU postoperatively for hemodynamic monitoring, with hospital course complicated by periods of AMS most recently on 9/1 and associated with oliguria, severe ELVI and hyponatremia, which resolved but now stepped back up for to SICU on 9/10 for acute AMS, intermittent hypoglycemia, AFib with RVR. Perc Choley tube placed on 9/11 for enlarged GB.     NEURO: AMS and agitation (09/10-, currently been alert and oriented close to baseline for about 24 hours : Cont Seroquel, off Precedex. EEG neg, follow Neurology recs. Hx of depression - effexor Dc'd on 9/10. Pain control with Dilaudid for vac changes only. Melatonin PRN.  HENT: Cepacol  CV: Clinically appears hypovolemic with dry mucus membranes. Episode of Afib with RVR, resolved with 5mg IVP Metoprolol, Hx Afib/flutter: s/p DCCV, Off amiodarone given transaminitis and elevated T bili; Continue Metoprolol 5q6 while AMS. New ST Depressions on EKG with AFib and Trop 56 - elevated trops due to Afib with RVR, ; Hx HTN - Holding Norvasc, Losartan. Hx HLD: hold Lipitor given LFTs. TTE  (7/18) - PASP 64mmHg, EF 65-70%,   PULM: Saturating well on RA.   GI/FEN: NPO with sips of gatorade; Imodium/Lomotril/Octretotide for high ostomy and ECF output. PICC/TPN; transaminitis of unknown origin, distended gallbladder on CT 8/25, RUQ US negative. Distended GB s/p Perc Choley. PPI.   : Voids, previous severe ELVI with BUN/Cr now downtrending, Repeat Ulytes and UA pending. Nephrology recommendations appreciated.   ENDO: . Hx hypothyroid: IV Synthroid, TSH 2.180 (9.170), Recurrent Hypoglycemia on TPN  ID: Numerous SICU admissions from sepsis, most recently with concern for line sepsis. Now WBC dowtrended. Currently off abx. Imipenem (9/10--) // CTAP (8/25) without fluid collections, distended GB, atelectasis. Restart Imipenem (8/26--). Previously had C. tertium, Lactobacillis from his IR cx 7/3, and candida albicans, lactobacillus, vanc sensitive E faecium, vanc resistant E gallinarum, vanc resistant E casseliflavus, lactobacillus from his OR cx 7/5. Completed course of abx with imipenem (6/30-7/12, 7/23-7/30), Dapto (6/30-7/5 and 7/23-7/24). been off all abx since 7/30. empiric dapto (8/23-24) and cefepime (8/23-24).   PPX: SCDs, SQH   LINES: PIVs, LUE PICC (9/1 - )  WOUNDS/DRAINS: ECF abdominal wound with vac change Mon/Fri--next on 9/14. PT: Home health PT  DISPO: SICU   78 M w/ Crohn's, AFib/Flutter s/p DCCVs on amiodarone, remote ileocectomy and open appendectomy. Admitted (6/23) for SBO vs Crohns flare, s/p NGT decompression and s/p lap TRE converted to open TRE, SBR x 3, left in discontinuity with abthera vac on (6/27), RTOR for ileocolic resection, small bowel anastomosis, and abdominal wall closure on (6/28), c/b fluid collection s/p IR aspiration of perihepatic fluid on (7/3), c/b wound dehiscence s/p RTOR exlap, washout, ileocolic resection with end ileostomy, blow hole colostomy, red rubber from ileostomy to small bowel anastomosis; vicryl bridging mesh on (7/5) transferred to SICU postoperatively for hemodynamic monitoring, with hospital course complicated by periods of AMS most recently on 9/1 and associated with oliguria, severe ELVI and hyponatremia, which resolved but now stepped back up for to SICU on 9/10 for acute AMS, intermittent hypoglycemia, AFib with RVR. Perc Choley tube placed on 9/11 for enlarged GB.     NEURO: AMS and agitation (09/10-, currently been alert and oriented close to baseline for about 24 hours : Cont possible with Seroquel, off Precedex. EEG neg, follow Neurology recs. Hx of depression - effexor Dc'd on 9/10. Pain control with Dilaudid for vac changes only. Melatonin PRN.  HENT: Cepacol  CV: Clinically appears hypovolemic with dry mucus membranes. Episode of Afib with RVR, resolved with 5mg IVP Metoprolol, Hx Afib/flutter: s/p DCCV, Off amiodarone given transaminitis and elevated T bili; Continue Metoprolol 5q6 while AMS. New ST Depressions on EKG with AFib and Trop 56 - elevated trops due to Afib with RVR, ; Hx HTN - Holding Norvasc, Losartan. Hx HLD: hold Lipitor given LFTs. TTE  (7/18) - PASP 64mmHg, EF 65-70%, 1.5L LR bolus  PULM: Saturating well on RA.   GI/FEN: NPO with sips of gatorade; Imodium/Lomotril/Octretotide for high ostomy and ECF output. PICC/TPN; transaminitis of unknown origin, distended gallbladder on CT 8/25, RUQ US negative. Distended GB s/p Perc Choley. PPI.   : Voids, previous severe ELVI with BUN/Cr now downtrending, Repeat Ulytes and UA pending. Nephrology recommendations appreciated.   ENDO: mISS. Hx hypothyroid: IV Synthroid, TSH 2.180 (9.170), Recurrent Hypoglycemia on TPN  ID: Numerous SICU admissions from sepsis, most recently with concern for line sepsis. Now WBC dowtrended. Imipenem (9/10--) // CTAP (8/25) without fluid collections, distended GB, atelectasis. Restart Imipenem (8/26--). Previously had C. tertium, Lactobacillis from his IR cx 7/3, and candida albicans, lactobacillus, vanc sensitive E faecium, vanc resistant E gallinarum, vanc resistant E casseliflavus, lactobacillus from his OR cx 7/5. Completed course of abx with imipenem (6/30-7/12, 7/23-7/30), Dapto (6/30-7/5 and 7/23-7/24). been off all abx since 7/30. empiric dapto (8/23-24) and cefepime (8/23-24).   PPX: SCDs, SQH   LINES: PIVs, LUE PICC (9/1 - )  WOUNDS/DRAINS: ECF abdominal wound with vac change Mon/Fri--next on 9/14. PT: Home health PT, obtaining PT/OT today  DISPO: SICU

## 2023-09-12 NOTE — PROGRESS NOTE ADULT - SUBJECTIVE AND OBJECTIVE BOX
INTERVAL: Perc cony done yesterday with 180cc bilious fluid aspirated. EEG wnl.   Patient overall doing much better. Feels well without any more episodes of altered mental status/hallucinations. He's lying comfortably in bed with no complaints. Patient denies fever, nausea, vomiting, chest pain, and shortness of breath.    MEDICATIONS  (STANDING):  chlorhexidine 2% Cloths 1 Application(s) Topical <User Schedule>  cholestyramine Powder (Sugar-Free) 4 Gram(s) Oral two times a day  dextrose 5%. 1000 milliLiter(s) (50 mL/Hr) IV Continuous <Continuous>  dextrose 5%. 1000 milliLiter(s) (100 mL/Hr) IV Continuous <Continuous>  dextrose 50% Injectable 25 Gram(s) IV Push once  dextrose 50% Injectable 12.5 Gram(s) IV Push once  dextrose 50% Injectable 25 Gram(s) IV Push once  diphenoxylate/atropine 2 Tablet(s) Oral every 6 hours  glucagon  Injectable 1 milliGRAM(s) IntraMuscular once  heparin   Injectable 5000 Unit(s) SubCutaneous every 8 hours  imipenem/cilastatin  IVPB 500 milliGRAM(s) IV Intermittent every 8 hours  insulin lispro (ADMELOG) corrective regimen sliding scale   SubCutaneous every 6 hours  levothyroxine Injectable 40 MICROGram(s) IV Push <User Schedule>  lipid, fat emulsion (Fish Oil and Plant Based) 20% Infusion 1 Gm/kG/Day (42.1 mL/Hr) IV Continuous <Continuous>  loperamide 4 milliGRAM(s) Oral every 6 hours  metoprolol tartrate Injectable 5 milliGRAM(s) IV Push every 6 hours  nystatin Powder 1 Application(s) Topical three times a day  octreotide  Injectable 100 MICROGram(s) IV Push every 8 hours  pantoprazole  Injectable 40 milliGRAM(s) IV Push daily  Parenteral Nutrition - Adult 1 Each (104 mL/Hr) TPN Continuous <Continuous>  QUEtiapine 25 milliGRAM(s) Oral at bedtime    MEDICATIONS  (PRN):  benzocaine/menthol Lozenge 2 Lozenge Oral every 4 hours PRN Sore Throat  dextrose Oral Gel 15 Gram(s) Oral once PRN Blood Glucose LESS THAN 70 milliGRAM(s)/deciliter  melatonin 5 milliGRAM(s) Oral at bedtime PRN Sleep      Vital Signs Last 24 Hrs  T(C): 36.6 (12 Sep 2023 05:19), Max: 36.6 (12 Sep 2023 05:19)  T(F): 97.8 (12 Sep 2023 05:19), Max: 97.8 (12 Sep 2023 05:19)  HR: 75 (12 Sep 2023 07:00) (70 - 76)  BP: 124/60 (12 Sep 2023 07:00) (108/53 - 150/66)  BP(mean): 86 (12 Sep 2023 07:00) (76 - 108)  RR: 18 (12 Sep 2023 07:00) (13 - 28)  SpO2: 95% (12 Sep 2023 07:00) (92% - 100%)    Parameters below as of 12 Sep 2023 07:00  Patient On (Oxygen Delivery Method): room air    Physical Exam  General: Patient is doing well and lying in bed comfortably  Constitutional: alert and oriented; feels very well this morning  Pulm: Nonlabored breathing, no respiratory distress  CV: Regular rate and rhythm, normal sinus rhythm  Abd: soft, nontender, nondistended. No rebound, no guarding. Midline abdominal wound with wound vac with appropriate suction. Catheters in both ECF and Ileostomy with overlying ostomy appliances. JOYCE x 1 with enteric content. Perc Cony with bilious output.  Extremities: warm, well perfused, no edema    I&O's Detail    11 Sep 2023 07:01  -  12 Sep 2023 07:00  --------------------------------------------------------  IN:    Dexmedetomidine: 37.9 mL    Fat Emulsion (Fish Oil &amp; Plant Based) 20% Infusion: 631.5 mL    IV PiggyBack: 450 mL    Oral Fluid: 50 mL    RBC Washed Cells: 300 mL    TPN (Total Parenteral Nutrition): 2496 mL  Total IN: 3965.4 mL    OUT:    Bulb (mL): 65 mL    Drain (mL): 565 mL    Drain (mL): 200 mL    Drain (mL): 320 mL    VAC (Vacuum Assisted Closure) System (mL): 200 mL    Voided (mL): 2900 mL  Total OUT: 4250 mL    Total NET: -284.6 mL        LABS:                        8.6    13.17 )-----------( 227      ( 12 Sep 2023 05:39 )             26.1     09-12    136  |  105  |  25<H>  ----------------------------<  104<H>  3.3<L>   |  24  |  1.67<H>    Ca    8.1<L>      12 Sep 2023 05:39  Phos  3.7     09-12  Mg     2.0     09-12    TPro  7.3  /  Alb  1.8<L>  /  TBili  3.2<H>  /  DBili  2.3<H>  /  AST  53<H>  /  ALT  39  /  AlkPhos  159<H>  09-12    PT/INR - ( 11 Sep 2023 04:47 )   PT: 13.8 sec;   INR: 1.22          PTT - ( 11 Sep 2023 04:47 )  PTT:32.0 sec  Urinalysis Basic - ( 12 Sep 2023 05:39 )    Color: x / Appearance: x / SG: x / pH: x  Gluc: 104 mg/dL / Ketone: x  / Bili: x / Urobili: x   Blood: x / Protein: x / Nitrite: x   Leuk Esterase: x / RBC: x / WBC x   Sq Epi: x / Non Sq Epi: x / Bacteria: x

## 2023-09-12 NOTE — PROGRESS NOTE ADULT - SUBJECTIVE AND OBJECTIVE BOX
INFECTIOUS DISEASES CONSULT FOLLOW-UP NOTE    INTERVAL HPI/OVERNIGHT EVENTS/ROS:   AMRITA, biliary fluid cx NGTD  Improving mental status, more alert on exam, no new reported symptoms, no abdominal pain     Constitutional, eyes, ENT, cardiovascular, respiratory, gastrointestinal, genitourinary, integumentary, neurological, psychiatric and heme/lymph are otherwise negative other than noted above       ANTIBIOTICS/RELEVANT:    MEDICATIONS  (STANDING):  amLODIPine   Tablet 10 milliGRAM(s) Oral every 24 hours  chlorhexidine 2% Cloths 1 Application(s) Topical <User Schedule>  chlorhexidine 2% Cloths 1 Application(s) Topical <User Schedule>  cholestyramine Powder (Sugar-Free) 4 Gram(s) Oral two times a day  dextrose 20%. 500 milliLiter(s) (65 mL/Hr) IV Continuous <Continuous>  dextrose 5%. 1000 milliLiter(s) (50 mL/Hr) IV Continuous <Continuous>  dextrose 50% Injectable 25 Gram(s) IV Push once  dextrose 50% Injectable 25 Gram(s) IV Push once  glucagon  Injectable 1 milliGRAM(s) IntraMuscular once  glucagon  Injectable 1 milliGRAM(s) IntraMuscular once  heparin   Injectable 5000 Unit(s) SubCutaneous every 8 hours  imipenem/cilastatin  IVPB 1000 milliGRAM(s) IV Intermittent every 8 hours  insulin lispro (ADMELOG) corrective regimen sliding scale   SubCutaneous every 6 hours  levothyroxine Injectable 40 MICROGram(s) IV Push <User Schedule>  loperamide 4 milliGRAM(s) Oral every 12 hours  losartan 25 milliGRAM(s) Oral daily  metoprolol tartrate 25 milliGRAM(s) Oral every 12 hours  nystatin Powder 1 Application(s) Topical three times a day  pantoprazole  Injectable 40 milliGRAM(s) IV Push daily  venlafaxine XR. 300 milliGRAM(s) Oral daily    MEDICATIONS  (PRN):  benzocaine/menthol Lozenge 2 Lozenge Oral every 4 hours PRN Sore Throat  dextrose Oral Gel 15 Gram(s) Oral once PRN Blood Glucose LESS THAN 70 milliGRAM(s)/deciliter  melatonin 5 milliGRAM(s) Oral at bedtime PRN Sleep        Vital Signs Last 24 Hrs  T(C): 36.8 (28 Aug 2023 06:01), Max: 36.8 (27 Aug 2023 21:00)  T(F): 98.2 (28 Aug 2023 06:01), Max: 98.3 (27 Aug 2023 21:00)  HR: 81 (28 Aug 2023 07:00) (79 - 84)  BP: 138/63 (28 Aug 2023 07:00) (102/66 - 143/65)  BP(mean): 86 (28 Aug 2023 07:00) (78 - 97)  RR: 18 (28 Aug 2023 07:00) (17 - 30)  SpO2: 92% (28 Aug 2023 07:00) (90% - 100%)    Parameters below as of 28 Aug 2023 07:00  Patient On (Oxygen Delivery Method): room air        08-27-23 @ 07:01  -  08-28-23 @ 07:00  --------------------------------------------------------  IN: 3084 mL / OUT: 3470 mL / NET: -386 mL    08-28-23 @ 07:01  -  08-28-23 @ 07:51  --------------------------------------------------------  IN: 65 mL / OUT: 0 mL / NET: 65 mL      PHYSICAL EXAM:  Constitutional: alert, NAD  HEENT: NCAT PERRLA, no palpable lymphadenopathy.   Pulmonary: no respiratory distress and lungs were clear to auscultation bilaterally.   Heart: heart rate was normal and rhythm regular, normal S1 and S2  Abdomen: Soft nontender. Wound vac in place, pouch w/ bilious output. R ileostomy & L JOYCE drain, surrounding skin C/d/i. Ext: Site of previous PICC-line on L arm healed, c/d/i.   Neurological: no focal deficits. AOx3        LABS:                        9.4    11.21 )-----------( 272      ( 28 Aug 2023 05:17 )             29.5     08-28    134<L>  |  103  |  8   ----------------------------<  97  4.3   |  24  |  0.97    Ca    8.7      28 Aug 2023 05:17  Phos  3.4     08-28  Mg     1.7     08-28    TPro  8.1  /  Alb  2.2<L>  /  TBili  3.8<H>  /  DBili  2.4<H>  /  AST  67<H>  /  ALT  89<H>  /  AlkPhos  198<H>  08-28    PT/INR - ( 28 Aug 2023 05:17 )   PT: 15.5 sec;   INR: 1.37          PTT - ( 28 Aug 2023 05:17 )  PTT:35.0 sec  Urinalysis Basic - ( 28 Aug 2023 05:17 )    Color: x / Appearance: x / SG: x / pH: x  Gluc: 97 mg/dL / Ketone: x  / Bili: x / Urobili: x   Blood: x / Protein: x / Nitrite: x   Leuk Esterase: x / RBC: x / WBC x   Sq Epi: x / Non Sq Epi: x / Bacteria: x        MICROBIOLOGY:      RADIOLOGY & ADDITIONAL STUDIES:  Reviewed

## 2023-09-13 NOTE — PROGRESS NOTE ADULT - SUBJECTIVE AND OBJECTIVE BOX
SUBJECTIVE: Pt seen and examined at bedside this am by surgery team. Patient is lying comfortably in bed. Vac leaked overnight, reinforced overnight but full vac change done today.    Conversing well, denies any further episodes of confusion/altered mental status.     MEDICATIONS  (STANDING):  chlorhexidine 2% Cloths 1 Application(s) Topical <User Schedule>  cholestyramine Powder (Sugar-Free) 4 Gram(s) Oral two times a day  dextrose 5%. 1000 milliLiter(s) (100 mL/Hr) IV Continuous <Continuous>  dextrose 5%. 1000 milliLiter(s) (50 mL/Hr) IV Continuous <Continuous>  dextrose 50% Injectable 25 Gram(s) IV Push once  dextrose 50% Injectable 12.5 Gram(s) IV Push once  dextrose 50% Injectable 25 Gram(s) IV Push once  diphenoxylate/atropine 2 Tablet(s) Oral every 6 hours  glucagon  Injectable 1 milliGRAM(s) IntraMuscular once  heparin   Injectable 5000 Unit(s) SubCutaneous every 8 hours  imipenem/cilastatin  IVPB 500 milliGRAM(s) IV Intermittent every 8 hours  insulin lispro (ADMELOG) corrective regimen sliding scale   SubCutaneous every 6 hours  lipid, fat emulsion (Fish Oil and Plant Based) 20% Infusion 1 Gm/kG/Day (42.1 mL/Hr) IV Continuous <Continuous>  loperamide 4 milliGRAM(s) Oral every 6 hours  metoprolol tartrate 25 milliGRAM(s) Oral every 12 hours  nystatin Powder 1 Application(s) Topical three times a day  octreotide  Injectable 100 MICROGram(s) IV Push every 8 hours  Parenteral Nutrition - Adult 1 Each (104 mL/Hr) TPN Continuous <Continuous>  Parenteral Nutrition - Adult 1 Each (104 mL/Hr) TPN Continuous <Continuous>    MEDICATIONS  (PRN):  benzocaine/menthol Lozenge 2 Lozenge Oral every 4 hours PRN Sore Throat  dextrose Oral Gel 15 Gram(s) Oral once PRN Blood Glucose LESS THAN 70 milliGRAM(s)/deciliter  melatonin 5 milliGRAM(s) Oral at bedtime PRN Sleep      Vital Signs Last 24 Hrs  T(C): 36.4 (13 Sep 2023 18:15), Max: 36.9 (13 Sep 2023 13:32)  T(F): 97.6 (13 Sep 2023 18:15), Max: 98.4 (13 Sep 2023 13:32)  HR: 77 (13 Sep 2023 19:00) (75 - 87)  BP: 160/74 (13 Sep 2023 19:00) (129/66 - 172/74)  BP(mean): 106 (13 Sep 2023 19:00) (83 - 107)  RR: 16 (13 Sep 2023 19:00) (14 - 32)  SpO2: 94% (13 Sep 2023 19:00) (92% - 96%)    Parameters below as of 13 Sep 2023 20:00  Patient On (Oxygen Delivery Method): room air        Physical Exam  General: Patient is doing well and lying in bed comfortably  Constitutional: alert and oriented; feels very well this morning  Pulm: Nonlabored breathing, no respiratory distress  CV: Regular rate and rhythm, normal sinus rhythm  Abd: soft, nontender, nondistended. No rebound, no guarding. Midline abdominal wound with wound vac with noted leakd. ECF and Ileostomy with overlying ostomy appliances. JOYCE x 1 with enteric content. Perc Deb with bilious output.  Extremities: warm, well perfused, no edema    I&O's Detail    12 Sep 2023 07:01  -  13 Sep 2023 07:00  --------------------------------------------------------  IN:    Fat Emulsion (Fish Oil &amp; Plant Based) 20% Infusion: 505.2 mL    IV PiggyBack: 300 mL    IV PiggyBack: 100 mL    IV PiggyBack: 200 mL    Lactated Ringers Bolus: 1500 mL    Oral Fluid: 175 mL    TPN (Total Parenteral Nutrition): 2392 mL  Total IN: 5172.2 mL    OUT:    Bulb (mL): 137 mL    Drain (mL): 575 mL    Drain (mL): 0 mL    Drain (mL): 170 mL    Fat Emulsion (Fish Oil &amp; Plant Based) 20% Infusion: 0 mL    VAC (Vacuum Assisted Closure) System (mL): 220 mL    Voided (mL): 2780 mL  Total OUT: 3882 mL    Total NET: 1290.2 mL      13 Sep 2023 07:01  -  13 Sep 2023 19:23  --------------------------------------------------------  IN:    Fat Emulsion (Fish Oil &amp; Plant Based) 20% Infusion: 126.3 mL    IV PiggyBack: 200 mL    IV PiggyBack: 100 mL    Oral Fluid: 60 mL    TPN (Total Parenteral Nutrition): 1352 mL  Total IN: 1838.3 mL    OUT:    Bulb (mL): 5 mL    Drain (mL): 160 mL    Drain (mL): 0 mL    Drain (mL): 0 mL    Ileostomy (mL): 60 mL    VAC (Vacuum Assisted Closure) System (mL): 30 mL    Voided (mL): 1635 mL  Total OUT: 1890 mL    Total NET: -51.7 mL        LABS:                        8.3    12.58 )-----------( 222      ( 13 Sep 2023 05:30 )             25.6     09-13    136  |  104  |  25<H>  ----------------------------<  85  3.6   |  25  |  1.51<H>    Ca    8.1<L>      13 Sep 2023 05:30  Phos  3.0     09-13  Mg     2.0     09-13    TPro  7.1  /  Alb  2.0<L>  /  TBili  3.3<H>  /  DBili  x   /  AST  50<H>  /  ALT  35  /  AlkPhos  155<H>  09-13      Urinalysis Basic - ( 13 Sep 2023 05:30 )    Color: x / Appearance: x / SG: x / pH: x  Gluc: 85 mg/dL / Ketone: x  / Bili: x / Urobili: x   Blood: x / Protein: x / Nitrite: x   Leuk Esterase: x / RBC: x / WBC x   Sq Epi: x / Non Sq Epi: x / Bacteria: x

## 2023-09-13 NOTE — PROGRESS NOTE ADULT - SUBJECTIVE AND OBJECTIVE BOX
INTERVAL/OVERNIGHT EVENTS:  BG 77 - 1/2 AMP D50 given,     SUBJECTIVE:     POD #  6/27: Laparoscopic lysis of adhesions, converted to open lysis of adhesions, SBR x 3, temporary abdominal closure, 5L cryst, 1L 5% alb, 3 pRBC, 1 FFP, 1 plts  6/28: ex lap, removal of abthera, ileocolic resection, small bowel anastamosis, , 1.6 crystalloid, 250 5%, 175 uop   7/3: IR Gendel drained perihepatic aspiration of serous fluid.   7/5: RTOR exlap, washout, ileocolic resection with end ileostomy, blow hole colostomy, fistula, red rubber from ileostomy to small bowel anastomosis; vicryl bridging mesh; R JOYCE below ileostomy, L JOYCE at small bowel enterotomy repair; 500 LR, 500 5% albumin, 3u PRBC, 2 FFP, 400 UOP,   SICU Day #4    Neurologic Medications  melatonin 5 milliGRAM(s) Oral at bedtime PRN Sleep  QUEtiapine 25 milliGRAM(s) Oral at bedtime PRN if trouble sleeping    Respiratory Medications    Cardiovascular Medications  metoprolol tartrate Injectable 5 milliGRAM(s) IV Push every 6 hours    Gastrointestinal Medications  dextrose 5%. 1000 milliLiter(s) IV Continuous <Continuous>  dextrose 5%. 1000 milliLiter(s) IV Continuous <Continuous>  diphenoxylate/atropine 2 Tablet(s) Oral every 6 hours  lipid, fat emulsion (Fish Oil and Plant Based) 20% Infusion 1 Gm/kG/Day IV Continuous <Continuous>  loperamide 4 milliGRAM(s) Oral every 6 hours  pantoprazole  Injectable 40 milliGRAM(s) IV Push daily  Parenteral Nutrition - Adult 1 Each TPN Continuous <Continuous>    Genitourinary Medications    Hematologic/Oncologic Medications  heparin   Injectable 5000 Unit(s) SubCutaneous every 8 hours    Antimicrobial/Immunologic Medications  imipenem/cilastatin  IVPB 500 milliGRAM(s) IV Intermittent every 8 hours    Endocrine/Metabolic Medications  cholestyramine Powder (Sugar-Free) 4 Gram(s) Oral two times a day  dextrose 50% Injectable 25 Gram(s) IV Push once  dextrose 50% Injectable 25 milliLiter(s) IV Push once  dextrose 50% Injectable 12.5 Gram(s) IV Push once  dextrose 50% Injectable 25 Gram(s) IV Push once  dextrose Oral Gel 15 Gram(s) Oral once PRN Blood Glucose LESS THAN 70 milliGRAM(s)/deciliter  glucagon  Injectable 1 milliGRAM(s) IntraMuscular once  insulin lispro (ADMELOG) corrective regimen sliding scale   SubCutaneous every 6 hours  levothyroxine Injectable 40 MICROGram(s) IV Push <User Schedule>  octreotide  Injectable 100 MICROGram(s) IV Push every 8 hours    Topical/Other Medications  benzocaine/menthol Lozenge 2 Lozenge Oral every 4 hours PRN Sore Throat  chlorhexidine 2% Cloths 1 Application(s) Topical <User Schedule>  nystatin Powder 1 Application(s) Topical three times a day      MEDICATIONS  (PRN):  benzocaine/menthol Lozenge 2 Lozenge Oral every 4 hours PRN Sore Throat  dextrose Oral Gel 15 Gram(s) Oral once PRN Blood Glucose LESS THAN 70 milliGRAM(s)/deciliter  melatonin 5 milliGRAM(s) Oral at bedtime PRN Sleep  QUEtiapine 25 milliGRAM(s) Oral at bedtime PRN if trouble sleeping      I&O's Detail    11 Sep 2023 07:01  -  12 Sep 2023 07:00  --------------------------------------------------------  IN:    Dexmedetomidine: 37.9 mL    Fat Emulsion (Fish Oil &amp; Plant Based) 20% Infusion: 631.5 mL    IV PiggyBack: 450 mL    Oral Fluid: 50 mL    RBC Washed Cells: 300 mL    TPN (Total Parenteral Nutrition): 2496 mL  Total IN: 3965.4 mL    OUT:    Bulb (mL): 65 mL    Drain (mL): 565 mL    Drain (mL): 200 mL    Drain (mL): 320 mL    VAC (Vacuum Assisted Closure) System (mL): 200 mL    Voided (mL): 2900 mL  Total OUT: 4250 mL    Total NET: -284.6 mL      12 Sep 2023 07:01  -  13 Sep 2023 06:17  --------------------------------------------------------  IN:    Fat Emulsion (Fish Oil &amp; Plant Based) 20% Infusion: 505.2 mL    IV PiggyBack: 300 mL    IV PiggyBack: 100 mL    IV PiggyBack: 200 mL    Lactated Ringers Bolus: 1500 mL    Oral Fluid: 175 mL    TPN (Total Parenteral Nutrition): 2184 mL  Total IN: 4964.2 mL    OUT:    Bulb (mL): 130 mL    Drain (mL): 0 mL    Drain (mL): 170 mL    Drain (mL): 455 mL    Fat Emulsion (Fish Oil &amp; Plant Based) 20% Infusion: 0 mL    VAC (Vacuum Assisted Closure) System (mL): 150 mL    Voided (mL): 2480 mL  Total OUT: 3385 mL    Total NET: 1579.2 mL          Vital Signs Last 24 Hrs  T(C): 36.8 (12 Sep 2023 21:47), Max: 36.8 (12 Sep 2023 17:32)  T(F): 98.2 (12 Sep 2023 21:47), Max: 98.2 (12 Sep 2023 17:32)  HR: 78 (13 Sep 2023 05:00) (74 - 78)  BP: 138/63 (13 Sep 2023 05:00) (124/60 - 165/79)  BP(mean): 90 (13 Sep 2023 05:00) (80 - 115)  RR: 14 (13 Sep 2023 05:00) (14 - 29)  SpO2: 96% (13 Sep 2023 05:00) (92% - 99%)    Parameters below as of 13 Sep 2023 05:00  Patient On (Oxygen Delivery Method): room air        GENERAL: NAD, resting comfortably in bed  HEENT: NCAT, MMM  C/V: Normal rate, normal peripheral perfusion  PULM: Nonlabored breathing, no respiratory distress,   ABD: Soft, ND, NT, no rebound tenderness, no guarding  EXTREM: WWP, no edema, SCDs in place  NEURO: No focal deficits    LABS:                        8.3    12.58 )-----------( 222      ( 13 Sep 2023 05:30 )             25.6     09-12    136  |  105  |  25<H>  ----------------------------<  104<H>  3.3<L>   |  24  |  1.67<H>    Ca    8.1<L>      12 Sep 2023 05:39  Phos  3.7     09-12  Mg     2.0     09-12    TPro  7.3  /  Alb  1.8<L>  /  TBili  3.2<H>  /  DBili  2.3<H>  /  AST  53<H>  /  ALT  39  /  AlkPhos  159<H>  09-12      Urinalysis Basic - ( 12 Sep 2023 05:39 )    Color: x / Appearance: x / SG: x / pH: x  Gluc: 104 mg/dL / Ketone: x  / Bili: x / Urobili: x   Blood: x / Protein: x / Nitrite: x   Leuk Esterase: x / RBC: x / WBC x   Sq Epi: x / Non Sq Epi: x / Bacteria: x        RADIOLOGY & ADDITIONAL STUDIES:      Culture - Body Fluid with Gram Stain (collected 09-11-23 @ 15:00)  Source: Bile bile from gallbladder  Gram Stain (09-11-23 @ 18:20):    No organisms seen    No WBC's seen.  Preliminary Report (09-12-23 @ 09:04):    No growth to date.    Culture - Blood (collected 09-10-23 @ 05:19)  Source: .Blood Blood  Preliminary Report (09-13-23 @ 06:00):    No growth at 3 days.    Culture - Blood (collected 09-10-23 @ 05:19)  Source: .Blood Blood  Preliminary Report (09-13-23 @ 06:00):    No growth at 3 days.    Urinalysis with Rflx Culture (collected 09-09-23 @ 16:13)     INTERVAL/OVERNIGHT EVENTS:  BG 77 - 1/2 AMP D50 given,     SUBJECTIVE: Patient doing well. No complaints of pain. Reports having a restful night.     POD #  6/27: Laparoscopic lysis of adhesions, converted to open lysis of adhesions, SBR x 3, temporary abdominal closure, 5L cryst, 1L 5% alb, 3 pRBC, 1 FFP, 1 plts  6/28: ex lap, removal of abthera, ileocolic resection, small bowel anastamosis, , 1.6 crystalloid, 250 5%, 175 uop   7/3: IR Gendel drained perihepatic aspiration of serous fluid.   7/5: RTOR exlap, washout, ileocolic resection with end ileostomy, blow hole colostomy, fistula, red rubber from ileostomy to small bowel anastomosis; vicryl bridging mesh; R JOYCE below ileostomy, L JOYCE at small bowel enterotomy repair; 500 LR, 500 5% albumin, 3u PRBC, 2 FFP, 400 UOP,   SICU Day #4    Neurologic Medications  melatonin 5 milliGRAM(s) Oral at bedtime PRN Sleep  QUEtiapine 25 milliGRAM(s) Oral at bedtime PRN if trouble sleeping    Respiratory Medications    Cardiovascular Medications  metoprolol tartrate Injectable 5 milliGRAM(s) IV Push every 6 hours    Gastrointestinal Medications  dextrose 5%. 1000 milliLiter(s) IV Continuous <Continuous>  dextrose 5%. 1000 milliLiter(s) IV Continuous <Continuous>  diphenoxylate/atropine 2 Tablet(s) Oral every 6 hours  lipid, fat emulsion (Fish Oil and Plant Based) 20% Infusion 1 Gm/kG/Day IV Continuous <Continuous>  loperamide 4 milliGRAM(s) Oral every 6 hours  pantoprazole  Injectable 40 milliGRAM(s) IV Push daily  Parenteral Nutrition - Adult 1 Each TPN Continuous <Continuous>    Genitourinary Medications    Hematologic/Oncologic Medications  heparin   Injectable 5000 Unit(s) SubCutaneous every 8 hours    Antimicrobial/Immunologic Medications  imipenem/cilastatin  IVPB 500 milliGRAM(s) IV Intermittent every 8 hours    Endocrine/Metabolic Medications  cholestyramine Powder (Sugar-Free) 4 Gram(s) Oral two times a day  dextrose 50% Injectable 25 Gram(s) IV Push once  dextrose 50% Injectable 25 milliLiter(s) IV Push once  dextrose 50% Injectable 12.5 Gram(s) IV Push once  dextrose 50% Injectable 25 Gram(s) IV Push once  dextrose Oral Gel 15 Gram(s) Oral once PRN Blood Glucose LESS THAN 70 milliGRAM(s)/deciliter  glucagon  Injectable 1 milliGRAM(s) IntraMuscular once  insulin lispro (ADMELOG) corrective regimen sliding scale   SubCutaneous every 6 hours  levothyroxine Injectable 40 MICROGram(s) IV Push <User Schedule>  octreotide  Injectable 100 MICROGram(s) IV Push every 8 hours    Topical/Other Medications  benzocaine/menthol Lozenge 2 Lozenge Oral every 4 hours PRN Sore Throat  chlorhexidine 2% Cloths 1 Application(s) Topical <User Schedule>  nystatin Powder 1 Application(s) Topical three times a day      MEDICATIONS  (PRN):  benzocaine/menthol Lozenge 2 Lozenge Oral every 4 hours PRN Sore Throat  dextrose Oral Gel 15 Gram(s) Oral once PRN Blood Glucose LESS THAN 70 milliGRAM(s)/deciliter  melatonin 5 milliGRAM(s) Oral at bedtime PRN Sleep  QUEtiapine 25 milliGRAM(s) Oral at bedtime PRN if trouble sleeping      I&O's Detail    11 Sep 2023 07:01  -  12 Sep 2023 07:00  --------------------------------------------------------  IN:    Dexmedetomidine: 37.9 mL    Fat Emulsion (Fish Oil &amp; Plant Based) 20% Infusion: 631.5 mL    IV PiggyBack: 450 mL    Oral Fluid: 50 mL    RBC Washed Cells: 300 mL    TPN (Total Parenteral Nutrition): 2496 mL  Total IN: 3965.4 mL    OUT:    Bulb (mL): 65 mL    Drain (mL): 565 mL    Drain (mL): 200 mL    Drain (mL): 320 mL    VAC (Vacuum Assisted Closure) System (mL): 200 mL    Voided (mL): 2900 mL  Total OUT: 4250 mL    Total NET: -284.6 mL      12 Sep 2023 07:01  -  13 Sep 2023 06:17  --------------------------------------------------------  IN:    Fat Emulsion (Fish Oil &amp; Plant Based) 20% Infusion: 505.2 mL    IV PiggyBack: 300 mL    IV PiggyBack: 100 mL    IV PiggyBack: 200 mL    Lactated Ringers Bolus: 1500 mL    Oral Fluid: 175 mL    TPN (Total Parenteral Nutrition): 2184 mL  Total IN: 4964.2 mL    OUT:    Bulb (mL): 130 mL    Drain (mL): 0 mL    Drain (mL): 170 mL    Drain (mL): 455 mL    Fat Emulsion (Fish Oil &amp; Plant Based) 20% Infusion: 0 mL    VAC (Vacuum Assisted Closure) System (mL): 150 mL    Voided (mL): 2480 mL  Total OUT: 3385 mL    Total NET: 1579.2 mL          Vital Signs Last 24 Hrs  T(C): 36.8 (12 Sep 2023 21:47), Max: 36.8 (12 Sep 2023 17:32)  T(F): 98.2 (12 Sep 2023 21:47), Max: 98.2 (12 Sep 2023 17:32)  HR: 78 (13 Sep 2023 05:00) (74 - 78)  BP: 138/63 (13 Sep 2023 05:00) (124/60 - 165/79)  BP(mean): 90 (13 Sep 2023 05:00) (80 - 115)  RR: 14 (13 Sep 2023 05:00) (14 - 29)  SpO2: 96% (13 Sep 2023 05:00) (92% - 99%)    Parameters below as of 13 Sep 2023 05:00  Patient On (Oxygen Delivery Method): room air        GENERAL: NAD, resting comfortably in bed  HEENT: NCAT, dry mucous membranes, improved from yesterday  C/V: Normal rate and ryhthm, normal peripheral perfusion  PULM: Nonlabored breathing, no respiratory distress, clear lungs bilaterally    ABD: Soft, ND, NT, vac in place with fistula reinforced and not leaking, ostomy bag covering blow hole with green output, leaking but reinforced, per drain clean and in place with green fluid in bag   EXTREM: WWP, no edema, SCDs in place  NEURO: No focal deficits, alert and oriented to time, place, situation, and person     LABS:                        8.3    12.58 )-----------( 222      ( 13 Sep 2023 05:30 )             25.6     09-12    136  |  105  |  25<H>  ----------------------------<  104<H>  3.3<L>   |  24  |  1.67<H>    Ca    8.1<L>      12 Sep 2023 05:39  Phos  3.7     09-12  Mg     2.0     09-12    TPro  7.3  /  Alb  1.8<L>  /  TBili  3.2<H>  /  DBili  2.3<H>  /  AST  53<H>  /  ALT  39  /  AlkPhos  159<H>  09-12      Urinalysis Basic - ( 12 Sep 2023 05:39 )    Color: x / Appearance: x / SG: x / pH: x  Gluc: 104 mg/dL / Ketone: x  / Bili: x / Urobili: x   Blood: x / Protein: x / Nitrite: x   Leuk Esterase: x / RBC: x / WBC x   Sq Epi: x / Non Sq Epi: x / Bacteria: x        RADIOLOGY & ADDITIONAL STUDIES:      Culture - Body Fluid with Gram Stain (collected 09-11-23 @ 15:00)  Source: Bile bile from gallbladder  Gram Stain (09-11-23 @ 18:20):    No organisms seen    No WBC's seen.  Preliminary Report (09-12-23 @ 09:04):    No growth to date.    Culture - Blood (collected 09-10-23 @ 05:19)  Source: .Blood Blood  Preliminary Report (09-13-23 @ 06:00):    No growth at 3 days.    Culture - Blood (collected 09-10-23 @ 05:19)  Source: .Blood Blood  Preliminary Report (09-13-23 @ 06:00):    No growth at 3 days.    Urinalysis with Rflx Culture (collected 09-09-23 @ 16:13)

## 2023-09-13 NOTE — PROGRESS NOTE ADULT - ATTENDING COMMENTS
Crohn's, AF, SBR, ileocolic resection, blowhole colostomy, enteroatmospheric fistula  physical as above  empiric imipenem  continue TPN  follow off venlaxafine  has not needed seroquel  continue metoprolol   rest as above

## 2023-09-13 NOTE — PROGRESS NOTE ADULT - SUBJECTIVE AND OBJECTIVE BOX
INFECTIOUS DISEASES CONSULT FOLLOW-UP NOTE    INTERVAL HPI/OVERNIGHT EVENTS/ROS:   AMRITA, biliary fluid cx NGTD      Constitutional, eyes, ENT, cardiovascular, respiratory, gastrointestinal, genitourinary, integumentary, neurological, psychiatric and heme/lymph are otherwise negative other than noted above       ANTIBIOTICS/RELEVANT:    MEDICATIONS  (STANDING):  amLODIPine   Tablet 10 milliGRAM(s) Oral every 24 hours  chlorhexidine 2% Cloths 1 Application(s) Topical <User Schedule>  chlorhexidine 2% Cloths 1 Application(s) Topical <User Schedule>  cholestyramine Powder (Sugar-Free) 4 Gram(s) Oral two times a day  dextrose 20%. 500 milliLiter(s) (65 mL/Hr) IV Continuous <Continuous>  dextrose 5%. 1000 milliLiter(s) (50 mL/Hr) IV Continuous <Continuous>  dextrose 50% Injectable 25 Gram(s) IV Push once  dextrose 50% Injectable 25 Gram(s) IV Push once  glucagon  Injectable 1 milliGRAM(s) IntraMuscular once  glucagon  Injectable 1 milliGRAM(s) IntraMuscular once  heparin   Injectable 5000 Unit(s) SubCutaneous every 8 hours  imipenem/cilastatin  IVPB 1000 milliGRAM(s) IV Intermittent every 8 hours  insulin lispro (ADMELOG) corrective regimen sliding scale   SubCutaneous every 6 hours  levothyroxine Injectable 40 MICROGram(s) IV Push <User Schedule>  loperamide 4 milliGRAM(s) Oral every 12 hours  losartan 25 milliGRAM(s) Oral daily  metoprolol tartrate 25 milliGRAM(s) Oral every 12 hours  nystatin Powder 1 Application(s) Topical three times a day  pantoprazole  Injectable 40 milliGRAM(s) IV Push daily  venlafaxine XR. 300 milliGRAM(s) Oral daily    MEDICATIONS  (PRN):  benzocaine/menthol Lozenge 2 Lozenge Oral every 4 hours PRN Sore Throat  dextrose Oral Gel 15 Gram(s) Oral once PRN Blood Glucose LESS THAN 70 milliGRAM(s)/deciliter  melatonin 5 milliGRAM(s) Oral at bedtime PRN Sleep        Vital Signs Last 24 Hrs  T(C): 36.8 (28 Aug 2023 06:01), Max: 36.8 (27 Aug 2023 21:00)  T(F): 98.2 (28 Aug 2023 06:01), Max: 98.3 (27 Aug 2023 21:00)  HR: 81 (28 Aug 2023 07:00) (79 - 84)  BP: 138/63 (28 Aug 2023 07:00) (102/66 - 143/65)  BP(mean): 86 (28 Aug 2023 07:00) (78 - 97)  RR: 18 (28 Aug 2023 07:00) (17 - 30)  SpO2: 92% (28 Aug 2023 07:00) (90% - 100%)    Parameters below as of 28 Aug 2023 07:00  Patient On (Oxygen Delivery Method): room air        08-27-23 @ 07:01  -  08-28-23 @ 07:00  --------------------------------------------------------  IN: 3084 mL / OUT: 3470 mL / NET: -386 mL    08-28-23 @ 07:01  -  08-28-23 @ 07:51  --------------------------------------------------------  IN: 65 mL / OUT: 0 mL / NET: 65 mL      PHYSICAL EXAM:  Constitutional: alert, NAD  HEENT: NCAT PERRLA, no palpable lymphadenopathy.   Pulmonary: no respiratory distress and lungs were clear to auscultation bilaterally.   Heart: heart rate was normal and rhythm regular, normal S1 and S2  Abdomen: Soft nontender. Wound vac in place, pouch w/ bilious output. R ileostomy & L JOYCE drain, surrounding skin C/d/i. Ext: Site of previous PICC-line on L arm healed, c/d/i.   Neurological: no focal deficits. AOx3        LABS:                        9.4    11.21 )-----------( 272      ( 28 Aug 2023 05:17 )             29.5     08-28    134<L>  |  103  |  8   ----------------------------<  97  4.3   |  24  |  0.97    Ca    8.7      28 Aug 2023 05:17  Phos  3.4     08-28  Mg     1.7     08-28    TPro  8.1  /  Alb  2.2<L>  /  TBili  3.8<H>  /  DBili  2.4<H>  /  AST  67<H>  /  ALT  89<H>  /  AlkPhos  198<H>  08-28    PT/INR - ( 28 Aug 2023 05:17 )   PT: 15.5 sec;   INR: 1.37          PTT - ( 28 Aug 2023 05:17 )  PTT:35.0 sec  Urinalysis Basic - ( 28 Aug 2023 05:17 )    Color: x / Appearance: x / SG: x / pH: x  Gluc: 97 mg/dL / Ketone: x  / Bili: x / Urobili: x   Blood: x / Protein: x / Nitrite: x   Leuk Esterase: x / RBC: x / WBC x   Sq Epi: x / Non Sq Epi: x / Bacteria: x        MICROBIOLOGY:      RADIOLOGY & ADDITIONAL STUDIES:  Reviewed INFECTIOUS DISEASES CONSULT FOLLOW-UP NOTE    INTERVAL HPI/OVERNIGHT EVENTS/ROS:   AMRITA, biliary fluid cx NGTD  Alert, feels well no complaints     Constitutional, eyes, ENT, cardiovascular, respiratory, gastrointestinal, genitourinary, integumentary, neurological, psychiatric and heme/lymph are otherwise negative other than noted above       ANTIBIOTICS/RELEVANT:    MEDICATIONS  (STANDING):  amLODIPine   Tablet 10 milliGRAM(s) Oral every 24 hours  chlorhexidine 2% Cloths 1 Application(s) Topical <User Schedule>  chlorhexidine 2% Cloths 1 Application(s) Topical <User Schedule>  cholestyramine Powder (Sugar-Free) 4 Gram(s) Oral two times a day  dextrose 20%. 500 milliLiter(s) (65 mL/Hr) IV Continuous <Continuous>  dextrose 5%. 1000 milliLiter(s) (50 mL/Hr) IV Continuous <Continuous>  dextrose 50% Injectable 25 Gram(s) IV Push once  dextrose 50% Injectable 25 Gram(s) IV Push once  glucagon  Injectable 1 milliGRAM(s) IntraMuscular once  glucagon  Injectable 1 milliGRAM(s) IntraMuscular once  heparin   Injectable 5000 Unit(s) SubCutaneous every 8 hours  imipenem/cilastatin  IVPB 1000 milliGRAM(s) IV Intermittent every 8 hours  insulin lispro (ADMELOG) corrective regimen sliding scale   SubCutaneous every 6 hours  levothyroxine Injectable 40 MICROGram(s) IV Push <User Schedule>  loperamide 4 milliGRAM(s) Oral every 12 hours  losartan 25 milliGRAM(s) Oral daily  metoprolol tartrate 25 milliGRAM(s) Oral every 12 hours  nystatin Powder 1 Application(s) Topical three times a day  pantoprazole  Injectable 40 milliGRAM(s) IV Push daily  venlafaxine XR. 300 milliGRAM(s) Oral daily    MEDICATIONS  (PRN):  benzocaine/menthol Lozenge 2 Lozenge Oral every 4 hours PRN Sore Throat  dextrose Oral Gel 15 Gram(s) Oral once PRN Blood Glucose LESS THAN 70 milliGRAM(s)/deciliter  melatonin 5 milliGRAM(s) Oral at bedtime PRN Sleep        Vital Signs Last 24 Hrs  T(C): 36.8 (28 Aug 2023 06:01), Max: 36.8 (27 Aug 2023 21:00)  T(F): 98.2 (28 Aug 2023 06:01), Max: 98.3 (27 Aug 2023 21:00)  HR: 81 (28 Aug 2023 07:00) (79 - 84)  BP: 138/63 (28 Aug 2023 07:00) (102/66 - 143/65)  BP(mean): 86 (28 Aug 2023 07:00) (78 - 97)  RR: 18 (28 Aug 2023 07:00) (17 - 30)  SpO2: 92% (28 Aug 2023 07:00) (90% - 100%)    Parameters below as of 28 Aug 2023 07:00  Patient On (Oxygen Delivery Method): room air        08-27-23 @ 07:01  -  08-28-23 @ 07:00  --------------------------------------------------------  IN: 3084 mL / OUT: 3470 mL / NET: -386 mL    08-28-23 @ 07:01  -  08-28-23 @ 07:51  --------------------------------------------------------  IN: 65 mL / OUT: 0 mL / NET: 65 mL      PHYSICAL EXAM:  Constitutional: alert, NAD  HEENT: NCAT PERRLA, no palpable lymphadenopathy.   Pulmonary: no respiratory distress and lungs were clear to auscultation bilaterally.   Heart: heart rate was normal and rhythm regular, normal S1 and S2  Abdomen: Soft nontender. Wound vac in place, pouch w/ bilious output. R ileostomy & L JOYCE drain, surrounding skin C/d/i. Ext: Site of previous PICC-line on L arm healed, c/d/i.   Neurological: no focal deficits. AOx3        LABS:                        9.4    11.21 )-----------( 272      ( 28 Aug 2023 05:17 )             29.5     08-28    134<L>  |  103  |  8   ----------------------------<  97  4.3   |  24  |  0.97    Ca    8.7      28 Aug 2023 05:17  Phos  3.4     08-28  Mg     1.7     08-28    TPro  8.1  /  Alb  2.2<L>  /  TBili  3.8<H>  /  DBili  2.4<H>  /  AST  67<H>  /  ALT  89<H>  /  AlkPhos  198<H>  08-28    PT/INR - ( 28 Aug 2023 05:17 )   PT: 15.5 sec;   INR: 1.37          PTT - ( 28 Aug 2023 05:17 )  PTT:35.0 sec  Urinalysis Basic - ( 28 Aug 2023 05:17 )    Color: x / Appearance: x / SG: x / pH: x  Gluc: 97 mg/dL / Ketone: x  / Bili: x / Urobili: x   Blood: x / Protein: x / Nitrite: x   Leuk Esterase: x / RBC: x / WBC x   Sq Epi: x / Non Sq Epi: x / Bacteria: x        MICROBIOLOGY:      RADIOLOGY & ADDITIONAL STUDIES:  Reviewed

## 2023-09-13 NOTE — PROGRESS NOTE ADULT - ASSESSMENT
78M PMH Crohns disease, prolonged hospitalization following SBR for SBO c/b feculent peritonitis s/p RTOR for ileocolic resection 7/5, now stepped back up to SICU given AMS likely 2/2 associated hypoglycemia (since resolved) and GB distention on imaging that is suggestive of acalculous cholecystitis although has been present on imaging earlier in admission, s/p perc cony draining 180cc bilious fluid, currently on imipenem/cilastin, biliary cx negative growth to date, improving significantly     -Continue imipenem/cilastin for total 5 course until 9/15   -Team 1 will sign off

## 2023-09-13 NOTE — PROGRESS NOTE ADULT - SUBJECTIVE AND OBJECTIVE BOX
SUBJECTIVE:  Patient seen and examined at bedside this AM   Leakage from fistula site noted overnight  Mental status much better. Awake, alert and pleasantly conversational   Patient has no specific complaints or concerns   Stool per ostomy & fistula   Voiding spontaneously       MEDICATIONS  (STANDING):  chlorhexidine 2% Cloths 1 Application(s) Topical <User Schedule>  cholestyramine Powder (Sugar-Free) 4 Gram(s) Oral two times a day  dextrose 5%. 1000 milliLiter(s) (50 mL/Hr) IV Continuous <Continuous>  dextrose 5%. 1000 milliLiter(s) (100 mL/Hr) IV Continuous <Continuous>  dextrose 50% Injectable 25 Gram(s) IV Push once  dextrose 50% Injectable 12.5 Gram(s) IV Push once  dextrose 50% Injectable 25 Gram(s) IV Push once  diphenoxylate/atropine 2 Tablet(s) Oral every 6 hours  glucagon  Injectable 1 milliGRAM(s) IntraMuscular once  heparin   Injectable 5000 Unit(s) SubCutaneous every 8 hours  imipenem/cilastatin  IVPB 500 milliGRAM(s) IV Intermittent every 8 hours  insulin lispro (ADMELOG) corrective regimen sliding scale   SubCutaneous every 6 hours  levothyroxine Injectable 40 MICROGram(s) IV Push <User Schedule>  lipid, fat emulsion (Fish Oil and Plant Based) 20% Infusion 1 Gm/kG/Day (42.1 mL/Hr) IV Continuous <Continuous>  loperamide 4 milliGRAM(s) Oral every 6 hours  metoprolol tartrate Injectable 5 milliGRAM(s) IV Push every 6 hours  nystatin Powder 1 Application(s) Topical three times a day  octreotide  Injectable 100 MICROGram(s) IV Push every 8 hours  pantoprazole  Injectable 40 milliGRAM(s) IV Push daily  Parenteral Nutrition - Adult 1 Each (104 mL/Hr) TPN Continuous <Continuous>  Parenteral Nutrition - Adult 1 Each (104 mL/Hr) TPN Continuous <Continuous>    MEDICATIONS  (PRN):  benzocaine/menthol Lozenge 2 Lozenge Oral every 4 hours PRN Sore Throat  dextrose Oral Gel 15 Gram(s) Oral once PRN Blood Glucose LESS THAN 70 milliGRAM(s)/deciliter  melatonin 5 milliGRAM(s) Oral at bedtime PRN Sleep      Vital Signs Last 24 Hrs  T(C): 36.7 (13 Sep 2023 09:10), Max: 36.8 (12 Sep 2023 17:32)  T(F): 98 (13 Sep 2023 09:10), Max: 98.2 (12 Sep 2023 17:32)  HR: 76 (13 Sep 2023 11:00) (74 - 87)  BP: 145/69 (13 Sep 2023 11:00) (127/60 - 172/74)  BP(mean): 99 (13 Sep 2023 11:00) (83 - 115)  RR: 25 (13 Sep 2023 11:00) (14 - 25)  SpO2: 96% (13 Sep 2023 11:00) (92% - 99%)    Parameters below as of 13 Sep 2023 12:00  Patient On (Oxygen Delivery Method): room air        Physical Exam:  General: NAD, resting comfortably in bed  C/V: NSR  Pulm: Nonlabored breathing, no respiratory distress  Abd: soft, wound vac in place with adequate seal and suction, Poole drain to LIS in fistula with surrounding wound manager to gravity, JOYCE drain in left abdomen, ostomy pink & patent, perc cony drain with bilious output 160 cc  : Adequate UOP  Extrem: WWP, no edema, SCDs in place   Neuro: A&Ox4; no focal deficits       I&O's Summary    12 Sep 2023 07:01  -  13 Sep 2023 07:00  --------------------------------------------------------  IN: 5172.2 mL / OUT: 3882 mL / NET: 1290.2 mL    13 Sep 2023 07:01  -  13 Sep 2023 12:18  --------------------------------------------------------  IN: 880 mL / OUT: 1245 mL / NET: -365 mL        LABS:                        8.3    12.58 )-----------( 222      ( 13 Sep 2023 05:30 )             25.6     09-13    136  |  104  |  25<H>  ----------------------------<  85  3.6   |  25  |  1.51<H>    Ca    8.1<L>      13 Sep 2023 05:30  Phos  3.0     09-13  Mg     2.0     09-13    TPro  7.1  /  Alb  2.0<L>  /  TBili  3.3<H>  /  DBili  x   /  AST  50<H>  /  ALT  35  /  AlkPhos  155<H>  09-13      Urinalysis Basic - ( 13 Sep 2023 05:30 )    Color: x / Appearance: x / SG: x / pH: x  Gluc: 85 mg/dL / Ketone: x  / Bili: x / Urobili: x   Blood: x / Protein: x / Nitrite: x   Leuk Esterase: x / RBC: x / WBC x   Sq Epi: x / Non Sq Epi: x / Bacteria: x      CAPILLARY BLOOD GLUCOSE      POCT Blood Glucose.: 104 mg/dL (13 Sep 2023 12:02)  POCT Blood Glucose.: 77 mg/dL (13 Sep 2023 05:49)  POCT Blood Glucose.: 94 mg/dL (12 Sep 2023 23:40)  POCT Blood Glucose.: 101 mg/dL (12 Sep 2023 17:39)    LIVER FUNCTIONS - ( 13 Sep 2023 05:30 )  Alb: 2.0 g/dL / Pro: 7.1 g/dL / ALK PHOS: 155 U/L / ALT: 35 U/L / AST: 50 U/L / GGT: x             RADIOLOGY & ADDITIONAL STUDIES:

## 2023-09-13 NOTE — PROGRESS NOTE ADULT - ASSESSMENT
78 M w/ Crohn's, AFib/Flutter s/p DCCVs on amiodarone, remote ileocectomy and open appendectomy. Admitted (6/23) for SBO vs Crohns flare, s/p NGT decompression and s/p lap TRE converted to open TRE, SBR x 3, left in discontinuity with abthera vac on (6/27), RTOR for ileocolic resection, small bowel anastomosis, and abdominal wall closure on (6/28), c/b fluid collection s/p IR aspiration of perihepatic fluid on (7/3), c/b wound dehiscence s/p RTOR exlap, washout, ileocolic resection with end ileostomy, blow hole colostomy, red rubber from ileostomy to small bowel anastomosis; vicryl bridging mesh on (7/5) transferred to SICU postoperatively for hemodynamic monitoring, with hospital course complicated by periods of AMS most recently on 9/1 and associated with oliguria, severe ELVI and hyponatremia, which resolved but now stepped back up for to SICU on 9/10 for acute AMS, intermittent hypoglycemia, AFib with RVR. Perc Choley tube placed on 9/11 for enlarged GB.     NEURO: Now with AMS and agitation: Cont Seroquel PRN if trouble sleeping, off Precedex. EEG neg, follow Neurology recs. Hx of depression - effexor Dc'd on 9/10. Pain control with Dilaudid for vac changes only. Melatonin PRN.  HENT: Cepacol  CV: Clinically appears hypovolemic with dry mucus membranes. Episode of Afib with RVR, resolved with 5mg IVP Metoprolol, Hx Afib/flutter: s/p DCCV, Off amiodarone given transaminitis and elevated T bili; Continue Metoprolol 5q6 while AMS. New ST Depressions on EKG with AFib and Trop 56 - elevated trops due to Afib with RVR, ; Hx HTN - Holding Norvasc, Losartan. Hx HLD: hold Lipitor given LFTs. TTE  (7/18) - PASP 64mmHg, EF 65-70%,   PULM: Saturating well on RA.   GI/FEN: NPO with sips of gatorade; Imodium/Lomotril/Octretotide for high ostomy and ECF output. PICC/TPN; transaminitis of unknown origin, distended gallbladder on CT 8/25, RUQ US negative. Distended GB s/p Perc Choley. PPI.   : Voids, previous severe ELVI with BUN/Cr now downtrending, Repeat Ulytes and UA pending. Nephrology recommendations appreciated.   ENDO: mISS. Mckeon hypothyroid: IV Synthroid, TSH 2.180 (9.170), Recurrent Hypoglycemia on TPN  ID: Numerous SICU admissions from sepsis, most recently with concern for line sepsis. Now WBC dowtrended. Currently off abx. Imipenem (9/10--) // CTAP (8/25) without fluid collections, distended GB, atelectasis. Restart Imipenem (8/26--). Previously had C. tertium, Lactobacillis from his IR cx 7/3, and candida albicans, lactobacillus, vanc sensitive E faecium, vanc resistant E gallinarum, vanc resistant E casseliflavus, lactobacillus from his OR cx 7/5. Completed course of abx with imipenem (6/30-7/12, 7/23-7/30), Dapto (6/30-7/5 and 7/23-7/24). been off all abx since 7/30. empiric dapto (8/23-24) and cefepime (8/23-24).   PPX: SCDs, SQH   LINES: PIVs, LUE PICC (9/1 - )  WOUNDS/DRAINS: ECF abdominal wound with vac change Mon/Fri--next on 9/14. PT: Home health PT  DISPO: SICU   78 M w/ Crohn's, AFib/Flutter s/p DCCVs on amiodarone, remote ileocectomy and open appendectomy. Admitted (6/23) for SBO vs Crohns flare, s/p NGT decompression and s/p lap TRE converted to open TRE, SBR x 3, left in discontinuity with abthera vac on (6/27), RTOR for ileocolic resection, small bowel anastomosis, and abdominal wall closure on (6/28), c/b fluid collection s/p IR aspiration of perihepatic fluid on (7/3), c/b wound dehiscence s/p RTOR exlap, washout, ileocolic resection with end ileostomy, blow hole colostomy, red rubber from ileostomy to small bowel anastomosis; vicryl bridging mesh on (7/5) transferred to SICU postoperatively for hemodynamic monitoring, with hospital course complicated by periods of AMS most recently on 9/1 and associated with oliguria, severe ELVI and hyponatremia, which resolved but now stepped back up for to SICU on 9/10 for acute AMS, intermittent hypoglycemia, AFib with RVR. Perc Choley tube placed on 9/11 for enlarged GB.     NEURO: Now with AMS and agitation: Cont Seroquel PRN if trouble sleeping, off Precedex. EEG neg, follow Neurology recs. Hx of depression - effexor Dc'd on 9/10. Pain control with Dilaudid for vac changes only. Melatonin PRN.  HENT: Cepacol  CV: Clinically appears hypovolemic with dry mucus membranes. Episode of Afib with RVR, resolved with 5mg IVP Metoprolol, Hx Afib/flutter: s/p DCCV, Off amiodarone given transaminitis and elevated T bili; Continue Metoprolol 5q6 while AMS. New ST Depressions on EKG with AFib and Trop 56 - elevated trops due to Afib with RVR, ; Hx HTN - Holding Norvasc, Losartan. Hx HLD: hold Lipitor given LFTs. TTE  (7/18) - PASP 64mmHg, EF 65-70%, Updating meds to PO  PULM: Saturating well on RA.   GI/FEN: NPO with sips of gatorade; Imodium/Lomotril/Octretotide for high ostomy and ECF output. PICC/TPN; transaminitis of unknown origin, distended gallbladder on CT 8/25, RUQ US negative. Distended GB s/p Perc Choley. PPI.   : Voids, previous severe ELVI with BUN/Cr now downtrending, Repeat Ulytes and UA pending. Nephrology recommendations appreciated.   ENDO: . Hx hypothyroid: IV Synthroid, TSH 2.180 (9.170), Recurrent Hypoglycemia on TPN  ID: Numerous SICU admissions from sepsis, most recently with concern for line sepsis. Now WBC dowtrended. Currently off abx. Imipenem (9/10--) // CTAP (8/25) without fluid collections, distended GB, atelectasis. Restart Imipenem (8/26--). Previously had C. tertium, Lactobacillis from his IR cx 7/3, and candida albicans, lactobacillus, vanc sensitive E faecium, vanc resistant E gallinarum, vanc resistant E casseliflavus, lactobacillus from his OR cx 7/5. Completed course of abx with imipenem (6/30-7/12, 7/23-7/30), Dapto (6/30-7/5 and 7/23-7/24). been off all abx since 7/30. empiric dapto (8/23-24) and cefepime (8/23-24).   PPX: SCDs, SQH   LINES: PIVs, LUE PICC (9/1 - )  WOUNDS/DRAINS: ECF abdominal wound with vac change Mon/Fri--next on 9/14. PT: Home health PT  DISPO: SICU   78 M w/ Crohn's, AFib/Flutter s/p DCCVs on amiodarone, remote ileocectomy and open appendectomy. Admitted (6/23) for SBO vs Crohns flare, s/p NGT decompression and s/p lap TRE converted to open TRE, SBR x 3, left in discontinuity with abthera vac on (6/27), RTOR for ileocolic resection, small bowel anastomosis, and abdominal wall closure on (6/28), c/b fluid collection s/p IR aspiration of perihepatic fluid on (7/3), c/b wound dehiscence s/p RTOR exlap, washout, ileocolic resection with end ileostomy, blow hole colostomy, red rubber from ileostomy to small bowel anastomosis; vicryl bridging mesh on (7/5) transferred to SICU postoperatively for hemodynamic monitoring, with hospital course complicated by periods of AMS most recently on 9/1 and associated with oliguria, severe ELVI and hyponatremia, which resolved but now stepped back up for to SICU on 9/10 for acute AMS, intermittent hypoglycemia, AFib with RVR. Perc Choley tube placed on 9/11 for enlarged GB.     NEURO: Now with AMS and agitation: d/c, off Precedex. EEG neg, follow Neurology recs. Hx of depression - effexor Dc'd on 9/10. Pain control with Dilaudid for vac changes only. Melatonin PRN. Alert and oriented over the last 48 hours.   HENT: Cepacol  CV: Clinically appears hypovolemic with dry mucus membranes. Episode of Afib with RVR, resolved with 5mg IVP Metoprolol, Hx Afib/flutter: s/p DCCV, Off amiodarone given transaminitis and elevated T bili; Continue Metoprolol 5q6 while AMS. New ST Depressions on EKG with AFib and Trop 56 - elevated trops due to Afib with RVR, ; Hx HTN - Holding Norvasc, Losartan. Hx HLD: hold Lipitor given LFTs. TTE  (7/18) - PASP 64mmHg, EF 65-70%, Updating meds to PO  PULM: Saturating well on RA.   GI/FEN: NPO with sips of gatorade; Imodium/Lomotril/Octretotide for high ostomy and ECF output. PICC/TPN; transaminitis of unknown origin, distended gallbladder on CT 8/25, RUQ US negative. Distended GB s/p Perc Choley. PPI.   : Voids, previous severe ELVI with BUN/Cr now downtrending, Repeat Ulytes and UA pending. Nephrology recommendations appreciated.   ENDO: . Hx hypothyroid: IV Synthroid, TSH 2.180 (9.170), Recurrent Hypoglycemia on TPN  ID: Numerous SICU admissions from sepsis, most recently with concern for line sepsis. Now WBC dowtrended. Currently off abx. Imipenem (9/10--) // CTAP (8/25) without fluid collections, distended GB, atelectasis. Restart Imipenem (8/26--). Previously had C. tertium, Lactobacillis from his IR cx 7/3, and candida albicans, lactobacillus, vanc sensitive E faecium, vanc resistant E gallinarum, vanc resistant E casseliflavus, lactobacillus from his OR cx 7/5. Completed course of abx with imipenem (6/30-7/12, 7/23-7/30), Dapto (6/30-7/5 and 7/23-7/24). been off all abx since 7/30. empiric dapto (8/23-24) and cefepime (8/23-24).   PPX: SCDs, SQH   LINES: PIVs, LUE PICC (9/1 - )  WOUNDS/DRAINS: ECF abdominal wound with vac change Mon/Fri--next on 9/14. PT: Home health PT  DISPO: SICU

## 2023-09-13 NOTE — PROGRESS NOTE ADULT - SUBJECTIVE AND OBJECTIVE BOX
78 M w/ Crohn's, AFib/Flutter s/p DCCVs on amiodarone, remote ileocectomy and open appendectomy. Admitted (6/23) for SBO vs Crohns flare, s/p NGT decompression and s/p lap TRE converted to open TRE, SBR x 3, left in discontinuity with abthera vac on (6/27), RTOR for ileocolic resection, small bowel anastomosis, and abdominal wall closure on (6/28), c/b fluid collection s/p IR aspiration of perihepatic fluid on (7/3), c/b wound dehiscence s/p RTOR exlap, washout, ileocolic resection with end ileostomy, blow hole colostomy, red rubber from ileostomy to small bowel anastomosis; vicryl bridging mesh on (7/5) transferred to SICU postoperatively for hemodynamic monitoring, with hospital course complicated by periods of AMS most recently on 9/1 and associated with oliguria, severe ELVI and hyponatremia, which resolved but now stepped back up for to SICU on 9/10 for acute AMS, intermittent hypoglycemia, AFib with RVR. Started on precedex for agitation overnight. CT abd 9/10 showing distended gallbladder with sludge. SP IR percutaneous cholecystostomy placement 9/11/23.    Perc cony: 575cc bilious outpt/24 hr, day prior 320cc . Dressing cdi. Flushed easily with 3cc NS. Continue to monitor. IR will continue to follow.

## 2023-09-13 NOTE — PROGRESS NOTE ADULT - ASSESSMENT
78M w/ Crohn's, AFib/Flutter s/p DCCVs on amiodarone, remote ileocectomy and open appy here for SBO vs Crohns flare, s/p NGT decompression and now s/p lap TRE converted to open TRE, SBR x 3, left in discontinuity with abthera vac on 6/27, RTOR for ileocolic resection, small bowel anastomosis, and abdominal wall closure on 6/28, and s/p IR aspiration of perihepatic fluid on 7/3, stepped down to telemetry on 7/4. wound dehiscence on 7/5, RTOR ex-lap, washout, ileocolic resection with end ileostomy, blow hole colostomy, red rubber from ileostomy to small bowel anastomosis; Vicryl bridging mesh on 7/5. Transferred to SICU post op for HD monitoring. Extubated 7/6. Surgical wound with decreasing in size and granulating with Vac. Tentative plan for skin graft per Plastics; timing TBD pending proper wound shrinkage and granulation   8/23: Upgraded to SICU 8/23 for acute delirium and tremors, r/o sepsis vs metabolic encephalopathy. Ammonia levels normal    8/25: Spiked temp to 101.3 overnight w/ new leukocytosis to 14   8/28: Downgraded from SICU. No growth from blood cultures. Abx x 5 days until 8/29 8/30: Leukocytosis resolved   9/1: Decreased UOP, soft pressures. Cr 3.08 from 1.12. C/f dehydration 2/2 high fistula output vs ELVI   9/2: Upgraded to SICU for worsening ELVI. Stepped down 9/6   9/10: Upgraded to SICU for AMS, hypoglycemia and A-fib.   9/11: In setting of transaminitis, hyperbilirubinemia, leukocytosis and GB distention, underwent IR perc cony     Leukocytosis improving. Stable mild transaminitis and hyperbilirubinemia. Hemodynamically normal and afebrile. Low output from fistula. Improved mental status      Plan:   - Appreciate ID clarification on need for Abx and duration   - Monitor LFT trend   - NPO/IVF/TPN   - Wound vac change today   - Monitor output from drains; replete PRN for high output    - PRN pain and nausea control   - Hx of A-fib/flutter; in NSR on rate control. Appreciate Cards/EP input   - DVT ppx   - OOB/PT  - Tentative plan for skin graft in 2 - 3 weeks per Plastics    D/w SICU, chief resident and attending

## 2023-09-13 NOTE — PROGRESS NOTE ADULT - ASSESSMENT
78 M w/ Crohn's, AFib/Flutter s/p DCCVs on amiodarone, remote ileocectomy and open appendectomy. Admitted (6/23) for SBO vs Crohns flare, s/p NGT decompression and s/p lap TRE converted to open TRE, SBR x 3, left in discontinuity with abthera vac on (6/27), RTOR for ileocolic resection, small bowel anastomosis, and abdominal wall closure on (6/28), c/b fluid collection s/p IR aspiration of perihepatic fluid on (7/3), c/b wound dehiscence s/p RTOR exlap, washout, ileocolic resection with end ileostomy, blow hole colostomy, red rubber from ileostomy to small bowel anastomosis; vicryl bridging mesh on (7/5) transferred to SICU postoperatively for hemodynamic monitoring, with hospital course complicated by periods of AMS most recently on 9/1 and associated with oliguria, severe ELVI and hyponatremia, which resolved but now stepped back up for to SICU on 9/10 for acute AMS, intermittent hypoglycemia, AFib with RVR.     Vac Change today  Continue with IV Abx per ID  Care per primary team and SICU  Surgery Team 4C will continue to follow. Please page Team 4 with questions/clinical changes. [phone]

## 2023-09-14 NOTE — PROGRESS NOTE ADULT - SUBJECTIVE AND OBJECTIVE BOX
SUBJECTIVE: Pt seen and examined at bedside this am by surgery team. Patient is lying comfortably in bed with no complaints. Tolerating PO meds. Denies any pain. Denies any further episodes of altered mental status or confusion. He feels very well.     MEDICATIONS  (STANDING):  chlorhexidine 2% Cloths 1 Application(s) Topical <User Schedule>  cholestyramine Powder (Sugar-Free) 4 Gram(s) Oral two times a day  dextrose 5%. 1000 milliLiter(s) (100 mL/Hr) IV Continuous <Continuous>  dextrose 5%. 1000 milliLiter(s) (50 mL/Hr) IV Continuous <Continuous>  dextrose 50% Injectable 25 Gram(s) IV Push once  dextrose 50% Injectable 12.5 Gram(s) IV Push once  dextrose 50% Injectable 25 Gram(s) IV Push once  diphenoxylate/atropine 2 Tablet(s) Oral every 6 hours  glucagon  Injectable 1 milliGRAM(s) IntraMuscular once  heparin   Injectable 5000 Unit(s) SubCutaneous every 8 hours  imipenem/cilastatin  IVPB 500 milliGRAM(s) IV Intermittent every 8 hours  insulin lispro (ADMELOG) corrective regimen sliding scale   SubCutaneous every 6 hours  levothyroxine 50 MICROGram(s) Oral daily  lipid, fat emulsion (Fish Oil and Plant Based) 20% Infusion 1 Gm/kG/Day (41.67 mL/Hr) IV Continuous <Continuous>  loperamide 4 milliGRAM(s) Oral every 6 hours  metoprolol tartrate 25 milliGRAM(s) Oral every 12 hours  nystatin Powder 1 Application(s) Topical three times a day  octreotide  Injectable 100 MICROGram(s) IV Push every 8 hours  pantoprazole    Tablet 40 milliGRAM(s) Oral before breakfast  Parenteral Nutrition - Adult 1 Each (104 mL/Hr) TPN Continuous <Continuous>  Parenteral Nutrition - Adult 1 Each (104 mL/Hr) TPN Continuous <Continuous>    MEDICATIONS  (PRN):  benzocaine/menthol Lozenge 2 Lozenge Oral every 4 hours PRN Sore Throat  dextrose Oral Gel 15 Gram(s) Oral once PRN Blood Glucose LESS THAN 70 milliGRAM(s)/deciliter  melatonin 5 milliGRAM(s) Oral at bedtime PRN Sleep      Vital Signs Last 24 Hrs  T(C): 36.9 (14 Sep 2023 09:12), Max: 37.2 (14 Sep 2023 01:20)  T(F): 98.4 (14 Sep 2023 09:12), Max: 98.9 (14 Sep 2023 01:20)  HR: 77 (14 Sep 2023 12:00) (75 - 81)  BP: 124/62 (14 Sep 2023 12:00) (124/62 - 170/83)  BP(mean): 87 (14 Sep 2023 12:00) (87 - 117)  RR: 16 (14 Sep 2023 12:00) (16 - 28)  SpO2: 98% (14 Sep 2023 12:00) (93% - 99%)    Parameters below as of 14 Sep 2023 13:00  Patient On (Oxygen Delivery Method): room air      Physical Exam  General: Patient is doing well and lying in bed comfortably  Constitutional: alert and oriented; feels very well this morning  Pulm: Nonlabored breathing, no respiratory distress  CV: Regular rate and rhythm, normal sinus rhythm  Abd: soft, nontender, nondistended. No rebound, no guarding. Midline abdominal wound with wound vac with noted leakd. ECF and Ileostomy with overlying ostomy appliances. JOYCE x 1 with enteric content. Perc Deb with bilious output.  Extremities: warm, well perfused, no edema    I&O's Detail    13 Sep 2023 07:01  -  14 Sep 2023 07:00  --------------------------------------------------------  IN:    Fat Emulsion (Fish Oil &amp; Plant Based) 20% Infusion: 589.4 mL    IV PiggyBack: 200 mL    IV PiggyBack: 150 mL    Lactated Ringers Bolus: 500 mL    Oral Fluid: 160 mL    TPN (Total Parenteral Nutrition): 2496 mL  Total IN: 4095.4 mL    OUT:    Bulb (mL): 35 mL    Drain (mL): 25 mL    Drain (mL): 125 mL    Drain (mL): 535 mL    Ileostomy (mL): 60 mL    VAC (Vacuum Assisted Closure) System (mL): 205 mL    Voided (mL): 3260 mL  Total OUT: 4245 mL    Total NET: -149.6 mL      14 Sep 2023 07:01  -  14 Sep 2023 12:56  --------------------------------------------------------  IN:    Fat Emulsion (Fish Oil &amp; Plant Based) 20% Infusion: 42.1 mL    TPN (Total Parenteral Nutrition): 416 mL  Total IN: 458.1 mL    OUT:    Bulb (mL): 0 mL    Drain (mL): 285 mL    Drain (mL): 160 mL    Ileostomy (mL): 40 mL    Voided (mL): 400 mL  Total OUT: 885 mL    Total NET: -426.9 mL        LABS:                        9.0    13.28 )-----------( 258      ( 14 Sep 2023 05:35 )             27.9     09-14    138  |  105  |  35<H>  ----------------------------<  107<H>  4.0   |  23  |  1.35<H>    Ca    8.4      14 Sep 2023 05:35  Phos  2.9     09-14  Mg     2.0     09-14    TPro  7.7  /  Alb  2.0<L>  /  TBili  3.4<H>  /  DBili  x   /  AST  52<H>  /  ALT  33  /  AlkPhos  155<H>  09-14      Urinalysis Basic - ( 14 Sep 2023 05:35 )    Color: x / Appearance: x / SG: x / pH: x  Gluc: 107 mg/dL / Ketone: x  / Bili: x / Urobili: x   Blood: x / Protein: x / Nitrite: x   Leuk Esterase: x / RBC: x / WBC x   Sq Epi: x / Non Sq Epi: x / Bacteria: x

## 2023-09-14 NOTE — PROGRESS NOTE ADULT - ATTENDING COMMENTS
I was physically present for the key portions of the evaluation and managemnent (E/M) service provided.  I agree with the above history, physical, and plan which I have reviewed and edited where appropriate, with the exceptions as per my note.    no further hallucinations. doing well. cont seroquel. advised that he can trial off seroquel in the coming months once outside the hospital.     call with questions.

## 2023-09-14 NOTE — PROGRESS NOTE ADULT - SUBJECTIVE AND OBJECTIVE BOX
SUBJECTIVE:   Patient seen and examined at bedside this AM   No acute overnight events. Mentating well   Patient has no specific complaints or concerns   Stool per ostomy & fistula   Voiding spontaneously and adequately       MEDICATIONS  (STANDING):  chlorhexidine 2% Cloths 1 Application(s) Topical <User Schedule>  cholestyramine Powder (Sugar-Free) 4 Gram(s) Oral two times a day  dextrose 5%. 1000 milliLiter(s) (100 mL/Hr) IV Continuous <Continuous>  dextrose 5%. 1000 milliLiter(s) (50 mL/Hr) IV Continuous <Continuous>  dextrose 50% Injectable 25 Gram(s) IV Push once  dextrose 50% Injectable 12.5 Gram(s) IV Push once  dextrose 50% Injectable 25 Gram(s) IV Push once  diphenoxylate/atropine 2 Tablet(s) Oral every 6 hours  glucagon  Injectable 1 milliGRAM(s) IntraMuscular once  heparin   Injectable 5000 Unit(s) SubCutaneous every 8 hours  imipenem/cilastatin  IVPB 500 milliGRAM(s) IV Intermittent every 8 hours  insulin lispro (ADMELOG) corrective regimen sliding scale   SubCutaneous every 6 hours  levothyroxine 50 MICROGram(s) Oral daily  lipid, fat emulsion (Fish Oil and Plant Based) 20% Infusion 1 Gm/kG/Day (42.1 mL/Hr) IV Continuous <Continuous>  loperamide 4 milliGRAM(s) Oral every 6 hours  metoprolol tartrate 25 milliGRAM(s) Oral every 12 hours  nystatin Powder 1 Application(s) Topical three times a day  octreotide  Injectable 100 MICROGram(s) IV Push every 8 hours  pantoprazole    Tablet 40 milliGRAM(s) Oral before breakfast  Parenteral Nutrition - Adult 1 Each (104 mL/Hr) TPN Continuous <Continuous>    MEDICATIONS  (PRN):  benzocaine/menthol Lozenge 2 Lozenge Oral every 4 hours PRN Sore Throat  dextrose Oral Gel 15 Gram(s) Oral once PRN Blood Glucose LESS THAN 70 milliGRAM(s)/deciliter  melatonin 5 milliGRAM(s) Oral at bedtime PRN Sleep      Vital Signs Last 24 Hrs  T(C): 36.6 (14 Sep 2023 06:01), Max: 37.2 (14 Sep 2023 01:20)  T(F): 97.9 (14 Sep 2023 06:01), Max: 98.9 (14 Sep 2023 01:20)  HR: 80 (14 Sep 2023 06:00) (75 - 81)  BP: 165/77 (14 Sep 2023 05:00) (129/66 - 170/83)  BP(mean): 110 (14 Sep 2023 05:00) (83 - 117)  RR: 21 (14 Sep 2023 06:00) (15 - 32)  SpO2: 95% (14 Sep 2023 06:00) (93% - 97%)    Parameters below as of 14 Sep 2023 07:00  Patient On (Oxygen Delivery Method): room air      Physical Exam:  General: NAD, resting comfortably in bed  C/V: NSR  Pulm: Nonlabored breathing, no respiratory distress  Abd: soft, wound vac in place with adequate seal and suction, Poole drain to LIS in fistula with surrounding wound manager to gravity, JOYCE drain in left abdomen, ostomy pink & patent, perc cony drain with bilious output 375 cc overnight  : Adequate UOP  Extrem: WWP, no edema, SCDs in place   Neuro: A&Ox4; no focal deficits     I&O's Summary    12 Sep 2023 07:01  -  13 Sep 2023 07:00  --------------------------------------------------------  IN: 5172.2 mL / OUT: 3882 mL / NET: 1290.2 mL    13 Sep 2023 07:01  -  14 Sep 2023 06:48  --------------------------------------------------------  IN: 4137.5 mL / OUT: 3970 mL / NET: 167.5 mL        LABS:                        9.0    13.28 )-----------( 258      ( 14 Sep 2023 05:35 )             27.9     09-13    136  |  104  |  25<H>  ----------------------------<  85  3.6   |  25  |  1.51<H>    Ca    8.1<L>      13 Sep 2023 05:30  Phos  3.0     09-13  Mg     2.0     09-13    TPro  7.1  /  Alb  2.0<L>  /  TBili  3.3<H>  /  DBili  x   /  AST  50<H>  /  ALT  35  /  AlkPhos  155<H>  09-13      Urinalysis Basic - ( 13 Sep 2023 05:30 )    Color: x / Appearance: x / SG: x / pH: x  Gluc: 85 mg/dL / Ketone: x  / Bili: x / Urobili: x   Blood: x / Protein: x / Nitrite: x   Leuk Esterase: x / RBC: x / WBC x   Sq Epi: x / Non Sq Epi: x / Bacteria: x      CAPILLARY BLOOD GLUCOSE      POCT Blood Glucose.: 113 mg/dL (14 Sep 2023 05:42)  POCT Blood Glucose.: 100 mg/dL (13 Sep 2023 23:19)  POCT Blood Glucose.: 94 mg/dL (13 Sep 2023 18:07)  POCT Blood Glucose.: 104 mg/dL (13 Sep 2023 12:02)    LIVER FUNCTIONS - ( 13 Sep 2023 05:30 )  Alb: 2.0 g/dL / Pro: 7.1 g/dL / ALK PHOS: 155 U/L / ALT: 35 U/L / AST: 50 U/L / GGT: x             RADIOLOGY & ADDITIONAL STUDIES:

## 2023-09-14 NOTE — PROGRESS NOTE ADULT - SUBJECTIVE AND OBJECTIVE BOX
INTERVAL/OVERNIGHT EVENTS: repleted with 500 cc bolus     SUBJECTIVE:     POD #  6/27: Laparoscopic lysis of adhesions, converted to open lysis of adhesions, SBR x 3, temporary abdominal closure, 5L cryst, 1L 5% alb, 3 pRBC, 1 FFP, 1 plts  6/28: ex lap, removal of abthera, ileocolic resection, small bowel anastamosis, , 1.6 crystalloid, 250 5%, 175 uop   7/3: IR Gendel drained perihepatic aspiration of serous fluid.   7/5: RTOR exlap, washout, ileocolic resection with end ileostomy, blow hole colostomy, fistula, red rubber from ileostomy to small bowel anastomosis; vicryl bridging mesh; R JOYCE below ileostomy, L JOYCE at small bowel enterotomy repair; 500 LR, 500 5% albumin, 3u PRBC, 2 FFP, 400 UOP,   SICU Day #5    Neurologic Medications  melatonin 5 milliGRAM(s) Oral at bedtime PRN Sleep    Respiratory Medications    Cardiovascular Medications  metoprolol tartrate 25 milliGRAM(s) Oral every 12 hours    Gastrointestinal Medications  dextrose 5%. 1000 milliLiter(s) IV Continuous <Continuous>  dextrose 5%. 1000 milliLiter(s) IV Continuous <Continuous>  diphenoxylate/atropine 2 Tablet(s) Oral every 6 hours  lipid, fat emulsion (Fish Oil and Plant Based) 20% Infusion 1 Gm/kG/Day IV Continuous <Continuous>  loperamide 4 milliGRAM(s) Oral every 6 hours  pantoprazole    Tablet 40 milliGRAM(s) Oral before breakfast  Parenteral Nutrition - Adult 1 Each TPN Continuous <Continuous>    Genitourinary Medications    Hematologic/Oncologic Medications  heparin   Injectable 5000 Unit(s) SubCutaneous every 8 hours    Antimicrobial/Immunologic Medications  imipenem/cilastatin  IVPB 500 milliGRAM(s) IV Intermittent every 8 hours    Endocrine/Metabolic Medications  cholestyramine Powder (Sugar-Free) 4 Gram(s) Oral two times a day  dextrose 50% Injectable 25 Gram(s) IV Push once  dextrose 50% Injectable 12.5 Gram(s) IV Push once  dextrose 50% Injectable 25 Gram(s) IV Push once  dextrose Oral Gel 15 Gram(s) Oral once PRN Blood Glucose LESS THAN 70 milliGRAM(s)/deciliter  glucagon  Injectable 1 milliGRAM(s) IntraMuscular once  insulin lispro (ADMELOG) corrective regimen sliding scale   SubCutaneous every 6 hours  levothyroxine 50 MICROGram(s) Oral daily  octreotide  Injectable 100 MICROGram(s) IV Push every 8 hours    Topical/Other Medications  benzocaine/menthol Lozenge 2 Lozenge Oral every 4 hours PRN Sore Throat  chlorhexidine 2% Cloths 1 Application(s) Topical <User Schedule>  nystatin Powder 1 Application(s) Topical three times a day      MEDICATIONS  (PRN):  benzocaine/menthol Lozenge 2 Lozenge Oral every 4 hours PRN Sore Throat  dextrose Oral Gel 15 Gram(s) Oral once PRN Blood Glucose LESS THAN 70 milliGRAM(s)/deciliter  melatonin 5 milliGRAM(s) Oral at bedtime PRN Sleep      I&O's Detail    12 Sep 2023 07:01  -  13 Sep 2023 07:00  --------------------------------------------------------  IN:    Fat Emulsion (Fish Oil &amp; Plant Based) 20% Infusion: 505.2 mL    IV PiggyBack: 300 mL    IV PiggyBack: 100 mL    IV PiggyBack: 200 mL    Lactated Ringers Bolus: 1500 mL    Oral Fluid: 175 mL    TPN (Total Parenteral Nutrition): 2392 mL  Total IN: 5172.2 mL    OUT:    Bulb (mL): 137 mL    Drain (mL): 575 mL    Drain (mL): 0 mL    Drain (mL): 170 mL    Fat Emulsion (Fish Oil &amp; Plant Based) 20% Infusion: 0 mL    VAC (Vacuum Assisted Closure) System (mL): 220 mL    Voided (mL): 2780 mL  Total OUT: 3882 mL    Total NET: 1290.2 mL      13 Sep 2023 07:01  -  14 Sep 2023 06:35  --------------------------------------------------------  IN:    Fat Emulsion (Fish Oil &amp; Plant Based) 20% Infusion: 631.5 mL    IV PiggyBack: 200 mL    IV PiggyBack: 150 mL    Lactated Ringers Bolus: 500 mL    Oral Fluid: 160 mL    TPN (Total Parenteral Nutrition): 2496 mL  Total IN: 4137.5 mL    OUT:    Bulb (mL): 35 mL    Drain (mL): 25 mL    Drain (mL): 125 mL    Drain (mL): 535 mL    Ileostomy (mL): 60 mL    VAC (Vacuum Assisted Closure) System (mL): 130 mL    Voided (mL): 3060 mL  Total OUT: 3970 mL    Total NET: 167.5 mL          Vital Signs Last 24 Hrs  T(C): 36.6 (14 Sep 2023 06:01), Max: 37.2 (14 Sep 2023 01:20)  T(F): 97.9 (14 Sep 2023 06:01), Max: 98.9 (14 Sep 2023 01:20)  HR: 80 (14 Sep 2023 06:00) (75 - 81)  BP: 165/77 (14 Sep 2023 05:00) (129/66 - 170/83)  BP(mean): 110 (14 Sep 2023 05:00) (83 - 117)  RR: 21 (14 Sep 2023 06:00) (15 - 32)  SpO2: 95% (14 Sep 2023 06:00) (93% - 97%)    Parameters below as of 14 Sep 2023 07:00  Patient On (Oxygen Delivery Method): room air        GENERAL: NAD, resting comfortably in bed  HEENT: NCAT, MMM  C/V: Normal rate, normal peripheral perfusion  PULM: Nonlabored breathing, no respiratory distress,   ABD: Soft, ND, NT, no rebound tenderness, no guarding  EXTREM: WWP, no edema, SCDs in place  NEURO: No focal deficits    LABS:                        9.0    13.28 )-----------( 258      ( 14 Sep 2023 05:35 )             27.9     09-13    136  |  104  |  25<H>  ----------------------------<  85  3.6   |  25  |  1.51<H>    Ca    8.1<L>      13 Sep 2023 05:30  Phos  3.0     09-13  Mg     2.0     09-13    TPro  7.1  /  Alb  2.0<L>  /  TBili  3.3<H>  /  DBili  x   /  AST  50<H>  /  ALT  35  /  AlkPhos  155<H>  09-13      Urinalysis Basic - ( 13 Sep 2023 05:30 )    Color: x / Appearance: x / SG: x / pH: x  Gluc: 85 mg/dL / Ketone: x  / Bili: x / Urobili: x   Blood: x / Protein: x / Nitrite: x   Leuk Esterase: x / RBC: x / WBC x   Sq Epi: x / Non Sq Epi: x / Bacteria: x        RADIOLOGY & ADDITIONAL STUDIES:      Culture - Body Fluid with Gram Stain (collected 09-11-23 @ 15:00)  Source: Bile bile from gallbladder  Gram Stain (09-11-23 @ 18:20):    No organisms seen    No WBC's seen.  Preliminary Report (09-12-23 @ 09:04):    No growth to date.    Culture - Blood (collected 09-10-23 @ 05:19)  Source: .Blood Blood  Preliminary Report (09-14-23 @ 06:00):    No growth at 4 days.    Culture - Blood (collected 09-10-23 @ 05:19)  Source: .Blood Blood  Preliminary Report (09-14-23 @ 06:00):    No growth at 4 days.    Urinalysis with Rflx Culture (collected 09-09-23 @ 16:13)     INTERVAL/OVERNIGHT EVENTS: repleted with 500 cc bolus     SUBJECTIVE: Patient denies any pain or current nausea. He reports a good night sleep.     POD #  6/27: Laparoscopic lysis of adhesions, converted to open lysis of adhesions, SBR x 3, temporary abdominal closure, 5L cryst, 1L 5% alb, 3 pRBC, 1 FFP, 1 plts  6/28: ex lap, removal of abthera, ileocolic resection, small bowel anastamosis, , 1.6 crystalloid, 250 5%, 175 uop   7/3: IR Gendel drained perihepatic aspiration of serous fluid.   7/5: RTOR exlap, washout, ileocolic resection with end ileostomy, blow hole colostomy, fistula, red rubber from ileostomy to small bowel anastomosis; vicryl bridging mesh; R JOYCE below ileostomy, L JOYCE at small bowel enterotomy repair; 500 LR, 500 5% albumin, 3u PRBC, 2 FFP, 400 UOP,   SICU Day #5    Neurologic Medications  melatonin 5 milliGRAM(s) Oral at bedtime PRN Sleep    Respiratory Medications    Cardiovascular Medications  metoprolol tartrate 25 milliGRAM(s) Oral every 12 hours    Gastrointestinal Medications  dextrose 5%. 1000 milliLiter(s) IV Continuous <Continuous>  dextrose 5%. 1000 milliLiter(s) IV Continuous <Continuous>  diphenoxylate/atropine 2 Tablet(s) Oral every 6 hours  lipid, fat emulsion (Fish Oil and Plant Based) 20% Infusion 1 Gm/kG/Day IV Continuous <Continuous>  loperamide 4 milliGRAM(s) Oral every 6 hours  pantoprazole    Tablet 40 milliGRAM(s) Oral before breakfast  Parenteral Nutrition - Adult 1 Each TPN Continuous <Continuous>    Genitourinary Medications    Hematologic/Oncologic Medications  heparin   Injectable 5000 Unit(s) SubCutaneous every 8 hours    Antimicrobial/Immunologic Medications  imipenem/cilastatin  IVPB 500 milliGRAM(s) IV Intermittent every 8 hours    Endocrine/Metabolic Medications  cholestyramine Powder (Sugar-Free) 4 Gram(s) Oral two times a day  dextrose 50% Injectable 25 Gram(s) IV Push once  dextrose 50% Injectable 12.5 Gram(s) IV Push once  dextrose 50% Injectable 25 Gram(s) IV Push once  dextrose Oral Gel 15 Gram(s) Oral once PRN Blood Glucose LESS THAN 70 milliGRAM(s)/deciliter  glucagon  Injectable 1 milliGRAM(s) IntraMuscular once  insulin lispro (ADMELOG) corrective regimen sliding scale   SubCutaneous every 6 hours  levothyroxine 50 MICROGram(s) Oral daily  octreotide  Injectable 100 MICROGram(s) IV Push every 8 hours    Topical/Other Medications  benzocaine/menthol Lozenge 2 Lozenge Oral every 4 hours PRN Sore Throat  chlorhexidine 2% Cloths 1 Application(s) Topical <User Schedule>  nystatin Powder 1 Application(s) Topical three times a day      MEDICATIONS  (PRN):  benzocaine/menthol Lozenge 2 Lozenge Oral every 4 hours PRN Sore Throat  dextrose Oral Gel 15 Gram(s) Oral once PRN Blood Glucose LESS THAN 70 milliGRAM(s)/deciliter  melatonin 5 milliGRAM(s) Oral at bedtime PRN Sleep      I&O's Detail    12 Sep 2023 07:01  -  13 Sep 2023 07:00  --------------------------------------------------------  IN:    Fat Emulsion (Fish Oil &amp; Plant Based) 20% Infusion: 505.2 mL    IV PiggyBack: 300 mL    IV PiggyBack: 100 mL    IV PiggyBack: 200 mL    Lactated Ringers Bolus: 1500 mL    Oral Fluid: 175 mL    TPN (Total Parenteral Nutrition): 2392 mL  Total IN: 5172.2 mL    OUT:    Bulb (mL): 137 mL    Drain (mL): 575 mL    Drain (mL): 0 mL    Drain (mL): 170 mL    Fat Emulsion (Fish Oil &amp; Plant Based) 20% Infusion: 0 mL    VAC (Vacuum Assisted Closure) System (mL): 220 mL    Voided (mL): 2780 mL  Total OUT: 3882 mL    Total NET: 1290.2 mL      13 Sep 2023 07:01  -  14 Sep 2023 06:35  --------------------------------------------------------  IN:    Fat Emulsion (Fish Oil &amp; Plant Based) 20% Infusion: 631.5 mL    IV PiggyBack: 200 mL    IV PiggyBack: 150 mL    Lactated Ringers Bolus: 500 mL    Oral Fluid: 160 mL    TPN (Total Parenteral Nutrition): 2496 mL  Total IN: 4137.5 mL    OUT:    Bulb (mL): 35 mL    Drain (mL): 25 mL    Drain (mL): 125 mL    Drain (mL): 535 mL    Ileostomy (mL): 60 mL    VAC (Vacuum Assisted Closure) System (mL): 130 mL    Voided (mL): 3060 mL  Total OUT: 3970 mL    Total NET: 167.5 mL          Vital Signs Last 24 Hrs  T(C): 36.6 (14 Sep 2023 06:01), Max: 37.2 (14 Sep 2023 01:20)  T(F): 97.9 (14 Sep 2023 06:01), Max: 98.9 (14 Sep 2023 01:20)  HR: 80 (14 Sep 2023 06:00) (75 - 81)  BP: 165/77 (14 Sep 2023 05:00) (129/66 - 170/83)  BP(mean): 110 (14 Sep 2023 05:00) (83 - 117)  RR: 21 (14 Sep 2023 06:00) (15 - 32)  SpO2: 95% (14 Sep 2023 06:00) (93% - 97%)    Parameters below as of 14 Sep 2023 07:00  Patient On (Oxygen Delivery Method): room air        GENERAL: NAD, resting comfortably in bed  HEENT: NCAT, MMM  C/V: Normal rate and rythym, normal peripheral perfusion  PULM: Nonlabored breathing, no respiratory distress, clear lungs bilaterally   ABD: Soft, ND, NT, vac in place, fistul bag in place to low suction with no appreciated leaks, ostomy bag over blow hole with mccauley in place with no appreciated leaks   EXTREM: WWP, no edema, SCDs in place  NEURO: No focal deficits    LABS:                        9.0    13.28 )-----------( 258      ( 14 Sep 2023 05:35 )             27.9     09-13    136  |  104  |  25<H>  ----------------------------<  85  3.6   |  25  |  1.51<H>    Ca    8.1<L>      13 Sep 2023 05:30  Phos  3.0     09-13  Mg     2.0     09-13    TPro  7.1  /  Alb  2.0<L>  /  TBili  3.3<H>  /  DBili  x   /  AST  50<H>  /  ALT  35  /  AlkPhos  155<H>  09-13      Urinalysis Basic - ( 13 Sep 2023 05:30 )    Color: x / Appearance: x / SG: x / pH: x  Gluc: 85 mg/dL / Ketone: x  / Bili: x / Urobili: x   Blood: x / Protein: x / Nitrite: x   Leuk Esterase: x / RBC: x / WBC x   Sq Epi: x / Non Sq Epi: x / Bacteria: x        RADIOLOGY & ADDITIONAL STUDIES:      Culture - Body Fluid with Gram Stain (collected 09-11-23 @ 15:00)  Source: Bile bile from gallbladder  Gram Stain (09-11-23 @ 18:20):    No organisms seen    No WBC's seen.  Preliminary Report (09-12-23 @ 09:04):    No growth to date.    Culture - Blood (collected 09-10-23 @ 05:19)  Source: .Blood Blood  Preliminary Report (09-14-23 @ 06:00):    No growth at 4 days.    Culture - Blood (collected 09-10-23 @ 05:19)  Source: .Blood Blood  Preliminary Report (09-14-23 @ 06:00):    No growth at 4 days.    Urinalysis with Rflx Culture (collected 09-09-23 @ 16:13)

## 2023-09-14 NOTE — PROGRESS NOTE ADULT - ASSESSMENT
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 EEG didn't reveal any seizure and captured the hallucination event. His presentation is mostly consistent with ICU delirium which has resolved now.     Recommendations:   - Can continue Seroquel 12.5 mg at 6 pm to prevent sundowning.  - Provide strong environmental cues to maintain normal sleep/wake cycle; Daytime - frequent verbal engagement and reorientation, full light in room, encourage family visits, make sure patient has seeing eyeglasses/hearing aids; NightTime - reduce light noise, avoid non-essential procedures between midnight and 6AM.  - Continue melatonin to promote sleep-wake cycle  - Neurology will sign off. Please call back with questions or concern. Thank you for the consult.

## 2023-09-14 NOTE — PROGRESS NOTE ADULT - ASSESSMENT
78M w/ Crohn's, AFib/Flutter s/p DCCVs on amiodarone, remote ileocectomy and open appy here for SBO vs Crohns flare, s/p NGT decompression and now s/p lap TRE converted to open TRE, SBR x 3, left in discontinuity with abthera vac on 6/27, RTOR for ileocolic resection, small bowel anastomosis, and abdominal wall closure on 6/28, and s/p IR aspiration of perihepatic fluid on 7/3, stepped down to telemetry on 7/4. wound dehiscence on 7/5, RTOR ex-lap, washout, ileocolic resection with end ileostomy, blow hole colostomy, red rubber from ileostomy to small bowel anastomosis; Vicryl bridging mesh on 7/5. Transferred to SICU post op for HD monitoring. Extubated 7/6. Surgical wound with decreasing in size and granulating with Vac. Tentative plan for skin graft per Plastics; timing TBD pending proper wound shrinkage and granulation   8/23: Upgraded to SICU 8/23 for acute delirium and tremors, r/o sepsis vs metabolic encephalopathy. Ammonia levels normal    8/25: Spiked temp to 101.3 overnight w/ new leukocytosis to 14   8/28: Downgraded from SICU. No growth from blood cultures. Abx x 5 days until 8/29 8/30: Leukocytosis resolved   9/1: Decreased UOP, soft pressures. Cr 3.08 from 1.12. C/f dehydration 2/2 high fistula output vs ELVI   9/2: Upgraded to SICU for worsening ELVI. Stepped down 9/6   9/10: Upgraded to SICU for AMS, hypoglycemia and A-fib.   9/11: In setting of transaminitis, hyperbilirubinemia, leukocytosis and GB distention, underwent IR perc cony     Leukocytosis improving. Stable mild transaminitis and hyperbilirubinemia. Hemodynamically normal and afebrile. Improved mental status. Total output overnight 2L       Plan:   - NPO/IVF/TPN   - Wound vac change twice weekly and PRN for significant leakage   - Monitor output from drains; replete PRN for high output    - PRN pain and nausea control   - Hx of A-fib/flutter; in NSR on rate control. Appreciate Cards/EP input   - DVT ppx   - OOB/PT  - Tentative plan for skin graft in 2 - 3 weeks per Plastics    D/w SICU, chief resident and attending  78M w/ Crohn's, AFib/Flutter s/p DCCVs on amiodarone, remote ileocectomy and open appy here for SBO vs Crohns flare, s/p NGT decompression and now s/p lap TRE converted to open TRE, SBR x 3, left in discontinuity with abthera vac on 6/27, RTOR for ileocolic resection, small bowel anastomosis, and abdominal wall closure on 6/28, and s/p IR aspiration of perihepatic fluid on 7/3, stepped down to telemetry on 7/4. wound dehiscence on 7/5, RTOR ex-lap, washout, ileocolic resection with end ileostomy, blow hole colostomy, red rubber from ileostomy to small bowel anastomosis; Vicryl bridging mesh on 7/5. Transferred to SICU post op for HD monitoring. Extubated 7/6. Surgical wound with decreasing in size and granulating with Vac. Tentative plan for skin graft per Plastics; timing TBD pending proper wound shrinkage and granulation   8/23: Upgraded to SICU 8/23 for acute delirium and tremors, r/o sepsis vs metabolic encephalopathy. Ammonia levels normal    8/25: Spiked temp to 101.3 overnight w/ new leukocytosis to 14   8/28: Downgraded from SICU. No growth from blood cultures. Abx x 5 days until 8/29 8/30: Leukocytosis resolved   9/1: Decreased UOP, soft pressures. Cr 3.08 from 1.12. C/f dehydration 2/2 high fistula output vs ELVI   9/2: Upgraded to SICU for worsening ELVI. Stepped down 9/6   9/10: Upgraded to SICU for AMS, hypoglycemia and A-fib.   9/11: In setting of transaminitis, hyperbilirubinemia, leukocytosis and GB distention, underwent IR perc cony     Stable mild leukocytosis. Stable mild transaminitis and hyperbilirubinemia. Normoglycemic. Hemodynamically normal and afebrile. Improved mental status. Low fistula output.    Plan:   - NPO/IVF/TPN   - Wound vac change twice weekly and PRN for significant leakage   - Monitor output from drains; replete PRN for high output    - PRN pain and nausea control   - Hx of A-fib/flutter; in NSR on rate control. Appreciate Cards/EP input   - DVT ppx   - OOB/PT  - Tentative plan for skin graft in 2 - 3 weeks per Plastics    D/w SICU, chief resident and attending

## 2023-09-14 NOTE — PROGRESS NOTE ADULT - SUBJECTIVE AND OBJECTIVE BOX
INTERVAL HPI/OVERNIGHT EVENTS:  Patient seen and examined. He is feeling much better, denies any more hallucination.     MEDICATIONS  (STANDING):  chlorhexidine 2% Cloths 1 Application(s) Topical <User Schedule>  cholestyramine Powder (Sugar-Free) 4 Gram(s) Oral two times a day  dextrose 5%. 1000 milliLiter(s) (50 mL/Hr) IV Continuous <Continuous>  dextrose 5%. 1000 milliLiter(s) (100 mL/Hr) IV Continuous <Continuous>  dextrose 50% Injectable 25 Gram(s) IV Push once  dextrose 50% Injectable 12.5 Gram(s) IV Push once  dextrose 50% Injectable 25 Gram(s) IV Push once  diphenoxylate/atropine 2 Tablet(s) Oral every 6 hours  glucagon  Injectable 1 milliGRAM(s) IntraMuscular once  heparin   Injectable 5000 Unit(s) SubCutaneous every 8 hours  imipenem/cilastatin  IVPB 500 milliGRAM(s) IV Intermittent every 8 hours  insulin lispro (ADMELOG) corrective regimen sliding scale   SubCutaneous every 6 hours  levothyroxine 50 MICROGram(s) Oral daily  lipid, fat emulsion (Fish Oil and Plant Based) 20% Infusion 1 Gm/kG/Day (41.67 mL/Hr) IV Continuous <Continuous>  loperamide 4 milliGRAM(s) Oral every 6 hours  metoprolol tartrate 25 milliGRAM(s) Oral every 12 hours  nystatin Powder 1 Application(s) Topical three times a day  octreotide  Injectable 100 MICROGram(s) IV Push every 8 hours  pantoprazole    Tablet 40 milliGRAM(s) Oral before breakfast  Parenteral Nutrition - Adult 1 Each (104 mL/Hr) TPN Continuous <Continuous>  Parenteral Nutrition - Adult 1 Each (104 mL/Hr) TPN Continuous <Continuous>    MEDICATIONS  (PRN):  benzocaine/menthol Lozenge 2 Lozenge Oral every 4 hours PRN Sore Throat  dextrose Oral Gel 15 Gram(s) Oral once PRN Blood Glucose LESS THAN 70 milliGRAM(s)/deciliter  melatonin 5 milliGRAM(s) Oral at bedtime PRN Sleep      Allergies    penicillin (Angioedema)    Intolerances        Vital Signs Last 24 Hrs  T(C): 36.6 (14 Sep 2023 13:44), Max: 37.2 (14 Sep 2023 01:20)  T(F): 97.9 (14 Sep 2023 13:44), Max: 98.9 (14 Sep 2023 01:20)  HR: 79 (14 Sep 2023 14:50) (75 - 81)  BP: 143/65 (14 Sep 2023 14:50) (124/62 - 176/70)  BP(mean): 93 (14 Sep 2023 14:50) (87 - 117)  RR: 23 (14 Sep 2023 14:50) (16 - 28)  SpO2: 97% (14 Sep 2023 14:50) (93% - 100%)    Parameters below as of 14 Sep 2023 15:00  Patient On (Oxygen Delivery Method): room air        Physical exam:  Neurologic:  -Mental status: Awake, alert, oriented to person, place, and time. Speech is fluent with intact naming, repetition, and comprehension, no dysarthria.. Follows commands. Attention/concentration intact.   -Cranial nerves:   II: Visual fields are full to confrontation.  III, IV, VI: Extraocular movements are intact without nystagmus. Pupils equally round and reactive to light  V:  Facial sensation V1-V3 equal and intact   VII: Face is symmetric with normal eye closure and smile  Motor: Normal bulk and tone. No pronator drift. Strength bilateral upper extremity 5/5, bilateral lower extremities 5/5.  Sensation: Intact to light touch bilaterally.   Coordination: No dysmetria on finger-to-nose  bilaterally  Reflexes: Downgoing toes bilaterally     LABS:                        9.0    13.28 )-----------( 258      ( 14 Sep 2023 05:35 )             27.9     09-14    138  |  105  |  35<H>  ----------------------------<  107<H>  4.0   |  23  |  1.35<H>    Ca    8.4      14 Sep 2023 05:35  Phos  2.9     09-14  Mg     2.0     09-14    TPro  7.7  /  Alb  2.0<L>  /  TBili  3.4<H>  /  DBili  x   /  AST  52<H>  /  ALT  33  /  AlkPhos  155<H>  09-14      Urinalysis Basic - ( 14 Sep 2023 05:35 )    Color: x / Appearance: x / SG: x / pH: x  Gluc: 107 mg/dL / Ketone: x  / Bili: x / Urobili: x   Blood: x / Protein: x / Nitrite: x   Leuk Esterase: x / RBC: x / WBC x   Sq Epi: x / Non Sq Epi: x / Bacteria: x

## 2023-09-14 NOTE — PROGRESS NOTE ADULT - SUBJECTIVE AND OBJECTIVE BOX
78 M w/ Crohn's, AFib/Flutter s/p DCCVs on amiodarone, remote ileocectomy and open appendectomy. Admitted (6/23) for SBO vs Crohns flare, s/p NGT decompression and s/p lap TRE converted to open TRE, SBR x 3, left in discontinuity with abthera vac on (6/27), RTOR for ileocolic resection, small bowel anastomosis, and abdominal wall closure on (6/28), c/b fluid collection s/p IR aspiration of perihepatic fluid on (7/3), c/b wound dehiscence s/p RTOR exlap, washout, ileocolic resection with end ileostomy, blow hole colostomy, red rubber from ileostomy to small bowel anastomosis; vicryl bridging mesh on (7/5) transferred to SICU postoperatively for hemodynamic monitoring, with hospital course complicated by periods of AMS most recently on 9/1 and associated with oliguria, severe ELVI and hyponatremia, which resolved but now stepped back up for to SICU on 9/10 for acute AMS, intermittent hypoglycemia, AFib with RVR. Started on precedex for agitation overnight. CT abd 9/10 showing distended gallbladder with sludge. SP IR percutaneous cholecystostomy placement 9/11/23.    Perc cony: 535cc dark bilious outpt/24 hr, day prior 565cc; . Dressing cdi. Flushed easily with 3cc NS. Continue to monitor. IR will continue to follow.

## 2023-09-14 NOTE — PROGRESS NOTE ADULT - ASSESSMENT
78 M w/ Crohn's, AFib/Flutter s/p DCCVs on amiodarone, remote ileocectomy and open appendectomy. Admitted (6/23) for SBO vs Crohns flare, s/p NGT decompression and s/p lap TRE converted to open TRE, SBR x 3, left in discontinuity with abthera vac on (6/27), RTOR for ileocolic resection, small bowel anastomosis, and abdominal wall closure on (6/28), c/b fluid collection s/p IR aspiration of perihepatic fluid on (7/3), c/b wound dehiscence s/p RTOR exlap, washout, ileocolic resection with end ileostomy, blow hole colostomy, red rubber from ileostomy to small bowel anastomosis; vicryl bridging mesh on (7/5) transferred to SICU postoperatively for hemodynamic monitoring, with hospital course complicated by periods of AMS most recently on 9/1 and associated with oliguria, severe ELVI and hyponatremia, which resolved but now stepped back up for to SICU on 9/10 for acute AMS, intermittent hypoglycemia, AFib with RVR. Perc Choley tube placed on 9/11 for enlarged GB.     NEURO: Now with AMS and agitation: Cont Seroquel PRN if trouble sleeping, off Precedex. EEG neg, follow Neurology recs. Hx of depression - effexor Dc'd on 9/10. Pain control with Dilaudid for vac changes only. Melatonin PRN.  HENT: Cepacol  CV: Clinically appears hypovolemic with dry mucus membranes. Episode of Afib with RVR, resolved with 5mg IVP Metoprolol, Hx Afib/flutter: s/p DCCV, Off amiodarone given transaminitis and elevated T bili; Continue Metoprolol 5q6 while AMS. New ST Depressions on EKG with AFib and Trop 56 - elevated trops due to Afib with RVR, ; Hx HTN - Holding Norvasc, Losartan. Hx HLD: hold Lipitor given LFTs. TTE  (7/18) - PASP 64mmHg, EF 65-70%,   PULM: Saturating well on RA.   GI/FEN: NPO with sips of gatorade; Imodium/Lomotril/Octretotide for high ostomy and ECF output. PICC/TPN; transaminitis of unknown origin, distended gallbladder on CT 8/25, RUQ US negative. Distended GB s/p Perc Choley. PPI.   : Voids, previous severe ELVI with BUN/Cr now downtrending, Repeat Ulytes and UA pending. Nephrology recommendations appreciated.   ENDO:  Hx hypothyroid: IV Synthroid, TSH 2.180 (9.170), Recurrent Hypoglycemia on TPN  ID: Numerous SICU admissions from sepsis, most recently with concern for line sepsis. Now WBC dowtrended. Currently off abx. Imipenem (9/10-9/15) // CTAP (8/25) without fluid collections, distended GB, atelectasis. Restart Imipenem (8/26--). Previously had C. tertium, Lactobacillis from his IR cx 7/3, and candida albicans, lactobacillus, vanc sensitive E faecium, vanc resistant E gallinarum, vanc resistant E casseliflavus, lactobacillus from his OR cx 7/5. Completed course of abx with imipenem (6/30-7/12, 7/23-7/30), Dapto (6/30-7/5 and 7/23-7/24). been off all abx since 7/30. empiric dapto (8/23-24) and cefepime (8/23-24).   PPX: SCDs, SQH   LINES: PIVs, LUE PICC (9/1 - )  WOUNDS/DRAINS: ECF abdominal wound with vac change Mon/Fri--next on 9/14. PT: Home health PT  DISPO: SICU 78 M w/ Crohn's, AFib/Flutter s/p DCCVs on amiodarone, remote ileocectomy and open appendectomy. Admitted (6/23) for SBO vs Crohns flare, s/p NGT decompression and s/p lap TRE converted to open TRE, SBR x 3, left in discontinuity with abthera vac on (6/27), RTOR for ileocolic resection, small bowel anastomosis, and abdominal wall closure on (6/28), c/b fluid collection s/p IR aspiration of perihepatic fluid on (7/3), c/b wound dehiscence s/p RTOR exlap, washout, ileocolic resection with end ileostomy, blow hole colostomy, red rubber from ileostomy to small bowel anastomosis; vicryl bridging mesh on (7/5) transferred to SICU postoperatively for hemodynamic monitoring, with hospital course complicated by periods of AMS most recently on 9/1 and associated with oliguria, severe ELVI and hyponatremia, which resolved but now stepped back up for to SICU on 9/10 for acute AMS, intermittent hypoglycemia, AFib with RVR. Perc Choley tube placed on 9/11 for enlarged GB.     NEURO: AMS and agitation been alert and oriented for last 72 hours: d/c Seroquel, off Precedex. EEG neg, follow Neurology recs. Hx of depression - effexor Dc'd on 9/10. Pain control with Dilaudid for vac changes only. Melatonin PRN.  HENT: Cepacol  CV: Clinically appears euvolemic. Episode of Afib with RVR, resolved with 5mg IVP Metoprolol, Hx Afib/flutter: s/p DCCV, Off amiodarone given transaminitis and elevated T bili; Continue Metoprolol 5q6 while AMS. New ST Depressions on EKG with AFib and Trop 56 - elevated trops due to Afib with RVR, ; Hx HTN - Holding Norvasc, Losartan. Hx HLD: hold Lipitor given LFTs. TTE  (7/18) - PASP 64mmHg, EF 65-70%,   PULM: Saturating well on RA.   GI/FEN: NPO with sips of gatorade; Imodium/Lomotril/Octretotide for high ostomy and ECF output. PICC/TPN; transaminitis of unknown origin, distended gallbladder on CT 8/25, RUQ US negative. Distended GB s/p Perc Choley. PPI.   : Voids, previous severe ELVI with BUN increase 35 (25) Cr now downtrending, Ulytes and UA normal. Nephrology recommendations appreciated.   ENDO:  Hx hypothyroid: IV Synthroid, TSH 2.180 (9.170), Recurrent Hypoglycemia on TPN  ID: Numerous SICU admissions from sepsis, most recently with concern for line sepsis. Now WBC dowtrended. Currently off abx. Imipenem (9/10-9/15) // CTAP (8/25) without fluid collections, distended GB, atelectasis. Restart Imipenem (8/26--). Previously had C. tertium, Lactobacillis from his IR cx 7/3, and candida albicans, lactobacillus, vanc sensitive E faecium, vanc resistant E gallinarum, vanc resistant E casseliflavus, lactobacillus from his OR cx 7/5. Completed course of abx with imipenem (6/30-7/12, 7/23-7/30), Dapto (6/30-7/5 and 7/23-7/24). been off all abx since 7/30. empiric dapto (8/23-24) and cefepime (8/23-24).   PPX: SCDs, SQH   LINES: PIVs, LUE PICC (9/1 - )  WOUNDS/DRAINS: ECF abdominal wound with vac change Mon/Fri--next on 9/14. PT: Home health PT  DISPO: SICU

## 2023-09-14 NOTE — CHART NOTE - NSCHARTNOTEFT_GEN_A_CORE
Admitting Diagnosis:   Patient is a 78y old  Male who presents with a chief complaint of Surgery (27 Aug 2023 13:44)      PAST MEDICAL & SURGICAL HISTORY:  Essential hypertension  Hypertension      Atrial fibrillation  s/p cardioversion 2010 and 2014  Pt. reports 4 DCCV  Now on Amiodarone 200mg PO bid and Eliquis 5mg PO bid  Last DCCV 4 yrs ago at [facility]      Crohn's disease  s/p partial resection of ileum      Hyperlipidemia      Hypothyroidism      History of depression  On Venlafaxine ER 150mg PO bid      H/O knee surgery      History of cataract surgery          Current Nutrition Order: NPO; TPN via PICC- 340g Dex, 172g AA, 100g SMOF lipids; provides 2844 kcals, 172g protein, GIR 2.65 mg/kg/min    PO Intake: Good (%) [   ]  Fair (50-75%) [   ] Poor (<25%) [   ]- N/A    GI Issues: pt denies abd pain, see subjective below.    Pain: no pain/discomfort noted    Skin Integrity: R heel DTI, LUQ JOYCE, mid abd wound vac, EC fistula draining via red rubber, ileostomy, L forearm skin tear. Rudy score 15    Labs:   09-14    138  |  105  |  35<H>  ----------------------------<  107<H>  4.0   |  23  |  1.35<H>    Ca    8.4      14 Sep 2023 05:35  Phos  2.9     09-14  Mg     2.0     09-14    TPro  7.7  /  Alb  2.0<L>  /  TBili  3.4<H>  /  DBili  x   /  AST  52<H>  /  ALT  33  /  AlkPhos  155<H>  09-14    CAPILLARY BLOOD GLUCOSE      POCT Blood Glucose.: 113 mg/dL (14 Sep 2023 05:42)  POCT Blood Glucose.: 100 mg/dL (13 Sep 2023 23:19)  POCT Blood Glucose.: 94 mg/dL (13 Sep 2023 18:07)  POCT Blood Glucose.: 104 mg/dL (13 Sep 2023 12:02)      Medications:  MEDICATIONS  (STANDING):  chlorhexidine 2% Cloths 1 Application(s) Topical <User Schedule>  cholestyramine Powder (Sugar-Free) 4 Gram(s) Oral two times a day  dextrose 5%. 1000 milliLiter(s) (50 mL/Hr) IV Continuous <Continuous>  dextrose 5%. 1000 milliLiter(s) (100 mL/Hr) IV Continuous <Continuous>  dextrose 50% Injectable 25 Gram(s) IV Push once  dextrose 50% Injectable 12.5 Gram(s) IV Push once  dextrose 50% Injectable 25 Gram(s) IV Push once  diphenoxylate/atropine 2 Tablet(s) Oral every 6 hours  glucagon  Injectable 1 milliGRAM(s) IntraMuscular once  heparin   Injectable 5000 Unit(s) SubCutaneous every 8 hours  imipenem/cilastatin  IVPB 500 milliGRAM(s) IV Intermittent every 8 hours  insulin lispro (ADMELOG) corrective regimen sliding scale   SubCutaneous every 6 hours  levothyroxine 50 MICROGram(s) Oral daily  lipid, fat emulsion (Fish Oil and Plant Based) 20% Infusion 1 Gm/kG/Day (42.1 mL/Hr) IV Continuous <Continuous>  loperamide 4 milliGRAM(s) Oral every 6 hours  metoprolol tartrate 25 milliGRAM(s) Oral every 12 hours  nystatin Powder 1 Application(s) Topical three times a day  octreotide  Injectable 100 MICROGram(s) IV Push every 8 hours  pantoprazole    Tablet 40 milliGRAM(s) Oral before breakfast  Parenteral Nutrition - Adult 1 Each (104 mL/Hr) TPN Continuous <Continuous>    MEDICATIONS  (PRN):  benzocaine/menthol Lozenge 2 Lozenge Oral every 4 hours PRN Sore Throat  dextrose Oral Gel 15 Gram(s) Oral once PRN Blood Glucose LESS THAN 70 milliGRAM(s)/deciliter  melatonin 5 milliGRAM(s) Oral at bedtime PRN Sleep      Weight: 89.1 [14 Sep 2023]  Daily 92.9kg [6 Sep 2023]  Daily 91.8kg [27 Aug 2023]  Daily 101kg [9 Aug 2023]  Daily   102.1kg [10 July 2023]  Daily 99.9kg [9 July 2023]    Weight Change: weight trending down throughout admission. Recommend continue weekly weights for trending / ensuring adequacy of nutrition provision    Estimated energy needs:   Ideal body weight (86.4kg) used for calculations as pt >100% of IBW, BMI <30 per [facility] Standards of Care. Needs estimated for age and adjusted for current clinical status, increased needs for post-op & open abd wound healing    Calories: 3766-8331 kcals based on 30-35 kcals/kg  Protein: 172.8-216 g protein based on 2.0-2.5g protein/kg  *Fluid needs per team    Subjective:   78M w/ Crohn's, AFib/Flutter s/p DCCVs on amiodarone, remote ileocectomy and open appy here for SBO vs Crohns flare, s/p NGT decompression and now s/p lap TRE converted to open TRE, SBR x 3, left in discontinuity with abthera vac on 6/27, RTOR for ileocolic resection, small bowel anastomosis, and abdominal wall closure on 6/28, and s/p IR aspiration of perihepatic fluid on 7/3, stepped down to telemetry on 7/4. wound dehiscence on 7/5, RTOR ex-lap, washout, ileocolic resection with end ileostomy, blow hole colostomy, red rubber from ileostomy to small bowel anastomosis; Vicryl bridging mesh on 7/5. Transferred to SICU post op for HD monitoring. Extubated 7/6 and SDU 8/2. Has been recovering on telemetry with ECF management and prolonged TPN. Upgraded to SICU 8/23 for acute delirium and tremors, r/o sepsis vs metabolic encephalopathy. Ammonia levels normal. TPN DC'ed and started on PO diet. Stepped down to 8 Lachman on 8/28.     Chart reviewed. TPN remains primary means to nutrition. Afebrile. HR & BP WNL. MAPs trending >65 mmHg. I&Os x 24hrs: 4095.4 [2496ml + 589.4ml] / 4245 [35ml + 205 ml wound vac output + 125ml abd drain output + 535ml cony drain output] = -149.6ml net. Current provision provides 32.9 kcals/kg, 24.9 non-protein kcals/kg, 2.0g protein/kg IBW. Labs reviewed- BUN 35 <-- 25 [9/13], Creat 1.35 <-- 1.51 [9/13]. To adjust protein provision today. LFTs elevated [no manganese, copper in TPN bag],  [9/14]. Meds reviewed.    Previous Nutrition Diagnosis:  Increased Nutrient Needs R/T physiological demands for nutrient AEB post-op wound healing    Active [ X  ]  Resolved [   ]    If resolved, new PES:     Goal:  Pt will meet at least 75% of protein & energy needs via most appropriate route for nutrition     Recommendations:    Education:     Risk Level: High [   ] Moderate [   ] Low [   ] Admitting Diagnosis:   Patient is a 78y old  Male who presents with a chief complaint of Surgery (27 Aug 2023 13:44)      PAST MEDICAL & SURGICAL HISTORY:  Essential hypertension  Hypertension      Atrial fibrillation  s/p cardioversion 2010 and 2014  Pt. reports 4 DCCV  Now on Amiodarone 200mg PO bid and Eliquis 5mg PO bid  Last DCCV 4 yrs ago at [facility]      Crohn's disease  s/p partial resection of ileum      Hyperlipidemia      Hypothyroidism      History of depression  On Venlafaxine ER 150mg PO bid      H/O knee surgery      History of cataract surgery          Current Nutrition Order: NPO; TPN via PICC- 340g Dex, 172g AA, 100g SMOF lipids; provides 2844 kcals, 172g protein, GIR 2.65 mg/kg/min    PO Intake: Good (%) [   ]  Fair (50-75%) [   ] Poor (<25%) [   ]- N/A    GI Issues: pt denies abd pain, stool per ostomy & fistula. See subjective below.    Pain: no pain/discomfort noted    Skin Integrity: R heel DTI, LUQ JOYCE, mid abd wound vac, EC fistula draining via red rubber, ileostomy, L forearm skin tear. Rudy score 15    Labs:   09-14    138  |  105  |  35<H>  ----------------------------<  107<H>  4.0   |  23  |  1.35<H>    Ca    8.4      14 Sep 2023 05:35  Phos  2.9     09-14  Mg     2.0     09-14    TPro  7.7  /  Alb  2.0<L>  /  TBili  3.4<H>  /  DBili  x   /  AST  52<H>  /  ALT  33  /  AlkPhos  155<H>  09-14    CAPILLARY BLOOD GLUCOSE      POCT Blood Glucose.: 113 mg/dL (14 Sep 2023 05:42)  POCT Blood Glucose.: 100 mg/dL (13 Sep 2023 23:19)  POCT Blood Glucose.: 94 mg/dL (13 Sep 2023 18:07)  POCT Blood Glucose.: 104 mg/dL (13 Sep 2023 12:02)      Medications:  MEDICATIONS  (STANDING):  chlorhexidine 2% Cloths 1 Application(s) Topical <User Schedule>  cholestyramine Powder (Sugar-Free) 4 Gram(s) Oral two times a day  dextrose 5%. 1000 milliLiter(s) (50 mL/Hr) IV Continuous <Continuous>  dextrose 5%. 1000 milliLiter(s) (100 mL/Hr) IV Continuous <Continuous>  dextrose 50% Injectable 25 Gram(s) IV Push once  dextrose 50% Injectable 12.5 Gram(s) IV Push once  dextrose 50% Injectable 25 Gram(s) IV Push once  diphenoxylate/atropine 2 Tablet(s) Oral every 6 hours  glucagon  Injectable 1 milliGRAM(s) IntraMuscular once  heparin   Injectable 5000 Unit(s) SubCutaneous every 8 hours  imipenem/cilastatin  IVPB 500 milliGRAM(s) IV Intermittent every 8 hours  insulin lispro (ADMELOG) corrective regimen sliding scale   SubCutaneous every 6 hours  levothyroxine 50 MICROGram(s) Oral daily  lipid, fat emulsion (Fish Oil and Plant Based) 20% Infusion 1 Gm/kG/Day (42.1 mL/Hr) IV Continuous <Continuous>  loperamide 4 milliGRAM(s) Oral every 6 hours  metoprolol tartrate 25 milliGRAM(s) Oral every 12 hours  nystatin Powder 1 Application(s) Topical three times a day  octreotide  Injectable 100 MICROGram(s) IV Push every 8 hours  pantoprazole    Tablet 40 milliGRAM(s) Oral before breakfast  Parenteral Nutrition - Adult 1 Each (104 mL/Hr) TPN Continuous <Continuous>    MEDICATIONS  (PRN):  benzocaine/menthol Lozenge 2 Lozenge Oral every 4 hours PRN Sore Throat  dextrose Oral Gel 15 Gram(s) Oral once PRN Blood Glucose LESS THAN 70 milliGRAM(s)/deciliter  melatonin 5 milliGRAM(s) Oral at bedtime PRN Sleep      Weight: 89.1 [14 Sep 2023]  Daily 92.9kg [6 Sep 2023]  Daily 91.8kg [27 Aug 2023]  Daily 101kg [9 Aug 2023]  Daily   102.1kg [10 July 2023]  Daily 99.9kg [9 July 2023]    Weight Change: weight trending down throughout admission. Recommend continue weekly weights for trending / ensuring adequacy of nutrition provision    Estimated energy needs:   Ideal body weight (86.4kg) used for calculations as pt >100% of IBW, BMI <30 per [facility] Standards of Care. Needs estimated for age and adjusted for current clinical status, increased needs for post-op & open abd wound healing    Calories: 0381-2295 kcals based on 30-35 kcals/kg  Protein: 172.8-216 g protein based on 2.0-2.5g protein/kg  *Fluid needs per team    Subjective:   78M w/ Crohn's, AFib/Flutter s/p DCCVs on amiodarone, remote ileocectomy and open appy here for SBO vs Crohns flare, s/p NGT decompression and now s/p lap TRE converted to open TRE, SBR x 3, left in discontinuity with abthera vac on 6/27, RTOR for ileocolic resection, small bowel anastomosis, and abdominal wall closure on 6/28, and s/p IR aspiration of perihepatic fluid on 7/3, stepped down to telemetry on 7/4. wound dehiscence on 7/5, RTOR ex-lap, washout, ileocolic resection with end ileostomy, blow hole colostomy, red rubber from ileostomy to small bowel anastomosis; Vicryl bridging mesh on 7/5. Transferred to SICU post op for HD monitoring. Extubated 7/6 and SDU 8/2. Has been recovering on telemetry with ECF management and prolonged TPN. Upgraded to SICU 8/23 for acute delirium and tremors, r/o sepsis vs metabolic encephalopathy. Ammonia levels normal. TPN DC'ed and started on PO diet. Stepped down to 8 Lachman on 8/28.     Chart reviewed. Pt seen at bedside on 5 EA, on room air. TPN remains primary means to nutrition. Afebrile. HR & BP WNL. MAPs trending >65 mmHg. I&Os x 24hrs: 4095.4 [2496ml + 589.4ml] / 4245 [35ml + 205 ml wound vac output + 125ml abd drain output + 535ml cony drain output] = -149.6ml net. Current provision provides 32.9 kcals/kg, 24.9 non-protein kcals/kg, 2.0g protein/kg IBW. Labs reviewed- BUN 35 <-- 25 [9/13], Creat 1.35 <-- 1.51 [9/13]. Recommend monitoring & adjust protein provision prn. LFTs elevated [no manganese, copper in TPN bag],  [9/14]. Meds reviewed. TPN reordered for tonight. RDN will continue to monitor, reassess, and intervene as appropriate.     Previous Nutrition Diagnosis:  Increased Nutrient Needs R/T physiological demands for nutrient AEB post-op wound healing    Active [ X  ]  Resolved [   ]    If resolved, new PES:     Goal:  Pt will meet at least 75% of protein & energy needs via most appropriate route for nutrition     Recommendations:  1. TPN via PICC-  Recommend 340g Dex, 146g AA, 100g SMOF lipids; provides 2740 kcals, 146g protein, GIR 2.54 mg/kg/min; provides 31.7 kcals/kg, 1.69g protein/kg, 24.9 non-protein kcals/kg IBW  - fluids & lytes per team  - c/w MVI, B1, Vit C, Zinc in TPN bag  - monitor resp status  2. Monitor renal labs  - adjust protein provision prn  3. Weekly lipid panel  - hold lipids if TG >400  4. Daily BMP, Mg, Phos. POC BG 4-6hrs  - monitor & replenish lytes outside TPN bag  5. Monitor potential for resuming PO diet  6. Pain & bowel regimen per team    Education: deferred    Risk Level: High [ X  ] Moderate [   ] Low [   ]

## 2023-09-14 NOTE — PROGRESS NOTE ADULT - ASSESSMENT
78 M w/ Crohn's, AFib/Flutter s/p DCCVs on amiodarone, remote ileocectomy and open appendectomy. Admitted (6/23) for SBO vs Crohns flare, s/p NGT decompression and s/p lap TRE converted to open TRE, SBR x 3, left in discontinuity with abthera vac on (6/27), RTOR for ileocolic resection, small bowel anastomosis, and abdominal wall closure on (6/28), c/b fluid collection s/p IR aspiration of perihepatic fluid on (7/3), c/b wound dehiscence s/p RTOR exlap, washout, ileocolic resection with end ileostomy, blow hole colostomy, red rubber from ileostomy to small bowel anastomosis; vicryl bridging mesh on (7/5) transferred to SICU postoperatively for hemodynamic monitoring, with hospital course complicated by periods of AMS most recently on 9/1 and associated with oliguria, severe ELVI and hyponatremia, which resolved but now stepped back up for to SICU on 9/10 for acute AMS, intermittent hypoglycemia, AFib with RVR.     Monitor VAC output, replete output prn  Continue with IV Abx per ID  Care per primary team and SICU  Surgery Team 4C will continue to follow. Please page Team 4 with questions/clinical changes. [phone]

## 2023-09-14 NOTE — PROGRESS NOTE ADULT - ATTENDING COMMENTS
Crohn's, AF, s/p SBR with ileocolic resection, blowhole colostomy, ileostomy and enteroatmospheric fistula  physical as above  continue imipenem  continue TPN  metoprolol  rest as above

## 2023-09-15 NOTE — PROGRESS NOTE ADULT - SUBJECTIVE AND OBJECTIVE BOX
78 M w/ Crohn's, AFib/Flutter s/p DCCVs on amiodarone, remote ileocectomy and open appendectomy. Admitted (6/23) for SBO vs Crohns flare, s/p NGT decompression and s/p lap TRE converted to open TRE, SBR x 3, left in discontinuity with abthera vac on (6/27), RTOR for ileocolic resection, small bowel anastomosis, and abdominal wall closure on (6/28), c/b fluid collection s/p IR aspiration of perihepatic fluid on (7/3), c/b wound dehiscence s/p RTOR exlap, washout, ileocolic resection with end ileostomy, blow hole colostomy, red rubber from ileostomy to small bowel anastomosis; vicryl bridging mesh on (7/5) transferred to SICU postoperatively for hemodynamic monitoring, with hospital course complicated by periods of AMS most recently on 9/1 and associated with oliguria, severe ELVI and hyponatremia, which resolved but now stepped back up for to SICU on 9/10 for acute AMS, intermittent hypoglycemia, AFib with RVR. Started on precedex for agitation overnight. CT abd 9/10 showing distended gallbladder with sludge. SP IR percutaneous cholecystostomy placement 9/11/23.    Perc cony: 555cc dark bilious outpt/24 hr, day prior 535cc; . Dressing cdi. Flushed easily with 3cc NS. Continue to monitor. IR will continue to follow.

## 2023-09-15 NOTE — PROGRESS NOTE ADULT - SUBJECTIVE AND OBJECTIVE BOX
INTERVAL/OVERNIGHT EVENTS: NAEO.     SUBJECTIVE: Doing well without pain, N/V. No CP/SOB. Feels well otherwise.    Neurologic Medications  melatonin 5 milliGRAM(s) Oral at bedtime  ondansetron Injectable 4 milliGRAM(s) IV Push every 8 hours PRN Nausea and/or Vomiting    Cardiovascular Medications  metoprolol tartrate 25 milliGRAM(s) Oral every 12 hours    Gastrointestinal Medications  lipid, fat emulsion (Fish Oil and Plant Based) 20% Infusion 1 Gm/kG/Day IV Continuous <Continuous>  loperamide 4 milliGRAM(s) Oral every 6 hours  pantoprazole    Tablet 40 milliGRAM(s) Oral before breakfast  Parenteral Nutrition - Adult 1 Each TPN Continuous <Continuous>    Hematologic/Oncologic Medications  heparin   Injectable 5000 Unit(s) SubCutaneous every 8 hours    Endocrine/Metabolic Medications  cholestyramine Powder (Sugar-Free) 4 Gram(s) Oral two times a day  glucagon  Injectable 1 milliGRAM(s) IntraMuscular once  insulin lispro (ADMELOG) corrective regimen sliding scale   SubCutaneous every 6 hours  levothyroxine 50 MICROGram(s) Oral daily    Topical/Other Medications  benzocaine/menthol Lozenge 2 Lozenge Oral every 4 hours PRN Sore Throat  chlorhexidine 2% Cloths 1 Application(s) Topical <User Schedule>  nystatin Powder 1 Application(s) Topical three times a day    MEDICATIONS  (PRN):  benzocaine/menthol Lozenge 2 Lozenge Oral every 4 hours PRN Sore Throat  dextrose Oral Gel 15 Gram(s) Oral once PRN Blood Glucose LESS THAN 70 milliGRAM(s)/deciliter  ondansetron Injectable 4 milliGRAM(s) IV Push every 8 hours PRN Nausea and/or Vomiting    I&O's Detail    15 Sep 2023 07:01  -  16 Sep 2023 07:00  --------------------------------------------------------  IN:    Fat Emulsion (Fish Oil &amp; Plant Based) 20% Infusion: 42.1 mL    Fat Emulsion (Fish Oil &amp; Plant Based) 20% Infusion: 583.8 mL    IV PiggyBack: 1000 mL    TPN (Total Parenteral Nutrition): 2392 mL  Total IN: 4017.9 mL    OUT:    Bulb (mL): 70 mL    Drain (mL): 570 mL    Drain (mL): 410 mL    Drain (mL): 120 mL    VAC (Vacuum Assisted Closure) System (mL): 150 mL    Voided (mL): 3125 mL  Total OUT: 4445 mL    Total NET: -427.1 mL    16 Sep 2023 07:01  -  16 Sep 2023 14:59  --------------------------------------------------------  IN:    TPN (Total Parenteral Nutrition): 624 mL  Total IN: 624 mL    OUT:    Drain (mL): 125 mL    Drain (mL): 150 mL    Voided (mL): 975 mL  Total OUT: 1250 mL    Total NET: -626 mL    Vital Signs Last 24 Hrs  T(C): 37.6 (16 Sep 2023 14:19), Max: 37.6 (16 Sep 2023 14:19)  T(F): 99.7 (16 Sep 2023 14:19), Max: 99.7 (16 Sep 2023 14:19)  HR: 79 (16 Sep 2023 13:00) (76 - 84)  BP: 169/79 (16 Sep 2023 13:00) (130/67 - 187/79)  BP(mean): 113 (16 Sep 2023 13:00) (86 - 129)  RR: 18 (16 Sep 2023 13:00) (8 - 28)  SpO2: 99% (16 Sep 2023 13:00) (94% - 100%)    Parameters below as of 16 Sep 2023 13:00  Patient On (Oxygen Delivery Method): room air    GENERAL: NAD, resting comfortably in bed  HEENT: NCAT, MMM  C/V: Normal rate and rythym, normal peripheral perfusion, no murmurs  PULM: Nonlabored breathing, no respiratory distress, clear lungs bilaterally   ABD: Soft, ND, NT, vac in place, fistul bag in place to low suction with no appreciated leaks, ostomy bag over blow hole with mccauley in place with no appreciated leaks   EXTREM: WWP, no edema, SCDs in place  NEURO: No focal deficits    LABS:                        9.3    12.79 )-----------( 264      ( 16 Sep 2023 05:20 )             30.0     09-16    134<L>  |  103  |  35<H>  ----------------------------<  106<H>  4.7   |  24  |  1.20    Ca    8.7      16 Sep 2023 05:20  Phos  3.1     09-16  Mg     1.9     09-16    TPro  7.9  /  Alb  2.1<L>  /  TBili  3.1<H>  /  DBili  2.1<H>  /  AST  66<H>  /  ALT  41  /  AlkPhos  186<H>  09-16    Urinalysis Basic - ( 16 Sep 2023 05:20 )    Color: x / Appearance: x / SG: x / pH: x  Gluc: 106 mg/dL / Ketone: x  / Bili: x / Urobili: x   Blood: x / Protein: x / Nitrite: x   Leuk Esterase: x / RBC: x / WBC x   Sq Epi: x / Non Sq Epi: x / Bacteria: x    RADIOLOGY & ADDITIONAL STUDIES:    Culture - Body Fluid with Gram Stain (collected 09-11-23 @ 15:00)  Source: Bile bile from gallbladder  Gram Stain (09-11-23 @ 18:20):    No organisms seen    No WBC's seen.  Final Report (09-16-23 @ 11:58):    No growth

## 2023-09-15 NOTE — PROGRESS NOTE ADULT - SUBJECTIVE AND OBJECTIVE BOX
SUBJECTIVE:   Patient seen and examined at bedside this AM   No acute overnight events. Mentating well   Patient has no specific complaints or concerns   Stool per ostomy & fistula   Voiding spontaneously and adequately       MEDICATIONS  (STANDING):  chlorhexidine 2% Cloths 1 Application(s) Topical <User Schedule>  cholestyramine Powder (Sugar-Free) 4 Gram(s) Oral two times a day  dextrose 5%. 1000 milliLiter(s) (50 mL/Hr) IV Continuous <Continuous>  dextrose 5%. 1000 milliLiter(s) (100 mL/Hr) IV Continuous <Continuous>  dextrose 50% Injectable 25 Gram(s) IV Push once  dextrose 50% Injectable 12.5 Gram(s) IV Push once  dextrose 50% Injectable 25 Gram(s) IV Push once  diphenoxylate/atropine 2 Tablet(s) Oral every 6 hours  glucagon  Injectable 1 milliGRAM(s) IntraMuscular once  heparin   Injectable 5000 Unit(s) SubCutaneous every 8 hours  insulin lispro (ADMELOG) corrective regimen sliding scale   SubCutaneous every 6 hours  levothyroxine 50 MICROGram(s) Oral daily  lipid, fat emulsion (Fish Oil and Plant Based) 20% Infusion 1 Gm/kG/Day (41.67 mL/Hr) IV Continuous <Continuous>  loperamide 4 milliGRAM(s) Oral every 6 hours  metoprolol tartrate 25 milliGRAM(s) Oral every 12 hours  nystatin Powder 1 Application(s) Topical three times a day  octreotide  Injectable 100 MICROGram(s) IV Push every 8 hours  pantoprazole    Tablet 40 milliGRAM(s) Oral before breakfast  Parenteral Nutrition - Adult 1 Each (104 mL/Hr) TPN Continuous <Continuous>  Parenteral Nutrition - Adult 1 Each (104 mL/Hr) TPN Continuous <Continuous>    MEDICATIONS  (PRN):  benzocaine/menthol Lozenge 2 Lozenge Oral every 4 hours PRN Sore Throat  dextrose Oral Gel 15 Gram(s) Oral once PRN Blood Glucose LESS THAN 70 milliGRAM(s)/deciliter  ondansetron Injectable 4 milliGRAM(s) IV Push every 8 hours PRN Nausea and/or Vomiting      Vital Signs Last 24 Hrs  T(C): 36.5 (15 Sep 2023 18:34), Max: 36.8 (15 Sep 2023 05:58)  T(F): 97.7 (15 Sep 2023 18:34), Max: 98.2 (15 Sep 2023 05:58)  HR: 76 (15 Sep 2023 18:00) (76 - 80)  BP: 173/78 (15 Sep 2023 18:00) (141/65 - 182/77)  BP(mean): 112 (15 Sep 2023 18:00) (94 - 112)  RR: 27 (15 Sep 2023 18:00) (13 - 32)  SpO2: 99% (15 Sep 2023 18:00) (93% - 100%)    Parameters below as of 15 Sep 2023 19:00  Patient On (Oxygen Delivery Method): room air      Physical Exam:  General: NAD, resting comfortably in bed  C/V: NSR  Pulm: Nonlabored breathing, no respiratory distress  Abd: soft, wound vac in place with adequate seal and suction, Poole drain to LIS in fistula with surrounding wound manager to gravity, JOYCE drain in left abdomen, ostomy pink & patent, perc cony drain with bilious output 320 cc overnight  : Adequate UOP  Extrem: WWP, no edema, SCDs in place   Neuro: A&Ox4; no focal deficits     I&O's Summary    14 Sep 2023 07:01  -  15 Sep 2023 07:00  --------------------------------------------------------  IN: 3219.6 mL / OUT: 4240 mL / NET: -1020.4 mL    15 Sep 2023 07:01  -  15 Sep 2023 19:28  --------------------------------------------------------  IN: 2082.1 mL / OUT: 2390 mL / NET: -307.9 mL        LABS:                        9.0    13.28 )-----------( 258      ( 14 Sep 2023 05:35 )             27.9     09-14    138  |  105  |  35<H>  ----------------------------<  107<H>  4.0   |  23  |  1.35<H>    Ca    8.4      14 Sep 2023 05:35  Phos  2.9     09-14  Mg     2.0     09-14    TPro  7.7  /  Alb  2.0<L>  /  TBili  3.4<H>  /  DBili  x   /  AST  52<H>  /  ALT  33  /  AlkPhos  155<H>  09-14      Urinalysis Basic - ( 14 Sep 2023 05:35 )    Color: x / Appearance: x / SG: x / pH: x  Gluc: 107 mg/dL / Ketone: x  / Bili: x / Urobili: x   Blood: x / Protein: x / Nitrite: x   Leuk Esterase: x / RBC: x / WBC x   Sq Epi: x / Non Sq Epi: x / Bacteria: x      CAPILLARY BLOOD GLUCOSE      POCT Blood Glucose.: 88 mg/dL (15 Sep 2023 17:07)  POCT Blood Glucose.: 89 mg/dL (15 Sep 2023 12:10)  POCT Blood Glucose.: 103 mg/dL (15 Sep 2023 05:43)  POCT Blood Glucose.: 106 mg/dL (14 Sep 2023 23:54)    LIVER FUNCTIONS - ( 14 Sep 2023 05:35 )  Alb: 2.0 g/dL / Pro: 7.7 g/dL / ALK PHOS: 155 U/L / ALT: 33 U/L / AST: 52 U/L / GGT: x             RADIOLOGY & ADDITIONAL STUDIES:

## 2023-09-15 NOTE — PROGRESS NOTE ADULT - ASSESSMENT
78 M w/ Crohn's, AFib/Flutter s/p DCCVs on amiodarone, remote ileocectomy and open appendectomy. Admitted (6/23) for SBO vs Crohns flare, s/p NGT decompression and s/p lap TRE converted to open TRE, SBR x 3, left in discontinuity with abthera vac on (6/27), RTOR for ileocolic resection, small bowel anastomosis, and abdominal wall closure on (6/28), c/b fluid collection s/p IR aspiration of perihepatic fluid on (7/3), c/b wound dehiscence s/p RTOR exlap, washout, ileocolic resection with end ileostomy, blow hole colostomy, red rubber from ileostomy to small bowel anastomosis; vicryl bridging mesh on (7/5) transferred to SICU postoperatively for hemodynamic monitoring, with hospital course complicated by periods of AMS most recently on 9/1 and associated with oliguria, severe ELVI and hyponatremia, which resolved but now stepped back up for to SICU on 9/10 for acute AMS, intermittent hypoglycemia, AFib with RVR.     VAC change performed 9/15 - next  9/18.    Plan     Monitor VAC output, replete output prn  Dr. Valera will evaluate abdominal wound, with plans for grafting  Continue with IV Abx per ID  Care per primary team and SICU  Surgery Team 4C will continue to follow. Please page Team 4 with questions/clinical changes. [phone]

## 2023-09-15 NOTE — PROGRESS NOTE ADULT - ASSESSMENT
78 M w/ Crohn's, AFib/Flutter s/p DCCVs on amiodarone, remote ileocectomy and open appendectomy. Admitted (6/23) for SBO vs Crohns flare, s/p NGT decompression and s/p lap TRE converted to open TRE, SBR x 3, left in discontinuity with abthera vac on (6/27), RTOR for ileocolic resection, small bowel anastomosis, and abdominal wall closure on (6/28), c/b fluid collection s/p IR aspiration of perihepatic fluid on (7/3), c/b wound dehiscence s/p RTOR exlap, washout, ileocolic resection with end ileostomy, blow hole colostomy, red rubber from ileostomy to small bowel anastomosis; vicryl bridging mesh on (7/5) transferred to SICU postoperatively for hemodynamic monitoring, with hospital course complicated by periods of AMS most recently on 9/1 and associated with oliguria, severe ELVI and hyponatremia, which resolved but now stepped back up for to SICU on 9/10 for acute AMS, intermittent hypoglycemia, AFib with RVR. Perc Choley tube placed on 9/11 for enlarged GB.     NEURO: AMS and agitation been alert and oriented for last 72 hours: d/c Seroquel, off Precedex. EEG neg, follow Neurology recs. Hx of depression - effexor Dc'd on 9/10. Pain control with Dilaudid for vac changes only.   HENT: Cepacol  CV: Clinically appears euvolemic. Episode of Afib with RVR, resolved with 5mg IVP Metoprolol, Hx Afib/flutter: s/p DCCV, Off amiodarone given transaminitis and elevated T bili; Continue Metoprolol 5q6 while AMS. New ST Depressions on EKG with AFib and Trop 56 - elevated trops due to Afib with RVR, ; Hx HTN - Holding Norvasc, Losartan. Hx HLD: hold Lipitor given LFTs. TTE  (7/18) - PASP 64mmHg, EF 65-70%,   PULM: Saturating well on RA.   GI/FEN: NPO with sips of gatorade; Imodium/Lomotril and Octreotide in TPN for high ostomy and ECF output. PICC/TPN; transaminitis of unknown origin, distended gallbladder on CT 8/25, RUQ US negative. Distended GB s/p Perc Choley. PPI.   : Voids, previous severe ELVI with BUN increase 35 (25) Cr now downtrending, Ulytes and UA normal. Nephrology recommendations appreciated. 1L LR ordered for ostomy output.  ENDO: mISS. Hx hypothyroid: IV Synthroid, TSH 2.180 (9.170), Recurrent Hypoglycemia on TPN  ID: Numerous SICU admissions from sepsis, most recently with concern for line sepsis. Now WBC dowtrended. Currently off abx. Imipenem (9/10-9/15) // CTAP (8/25) without fluid collections, distended GB, atelectasis. Imipenem (8/26--). Previously had C. tertium, Lactobacillis from his IR cx 7/3, and candida albicans, lactobacillus, vanc sensitive E faecium, vanc resistant E gallinarum, vanc resistant E casseliflavus, lactobacillus from his OR cx 7/5. Completed course of abx with imipenem (6/30-7/12, 7/23-7/30), Dapto (6/30-7/5 and 7/23-7/24). been off all abx since 7/30. empiric dapto (8/23-24) and cefepime (8/23-24).   PPX: SCDs, SQH   LINES: PIVs, LUE PICC (9/1 - )  WOUNDS/DRAINS: ECF abdominal wound with vac change Mon/Fri--next on 9/18. PT: Home health PT.   DISPO:

## 2023-09-15 NOTE — PROGRESS NOTE ADULT - NS ATTEND AMEND GEN_ALL_CORE FT
Crohn's, AF S/P SBR, ileocecolic resection, Franc's with end ileostomy, multiple episodes of sepsis and metabolic encephalopathy  physical as above  creatinine continues to decrease but continue to hold effexor as C creatinine is still low  follow cystatins when creat is stable  follow off abx  rest as above

## 2023-09-15 NOTE — PROGRESS NOTE ADULT - SUBJECTIVE AND OBJECTIVE BOX
SUBJECTIVE:    Pt seen and examined at bedside this am by surgery team. Patient is lying comfortably in bed with no complaints. Tolerating PO meds. Denies any pain. Denies any further episodes of altered mental status or confusion. He feels very well.     No nausea or vomiting   Voiding freely and appropriately  No further episodes of AMS   Denies any pain   OOB to chair     MEDICATIONS  (STANDING):  chlorhexidine 2% Cloths 1 Application(s) Topical <User Schedule>  cholestyramine Powder (Sugar-Free) 4 Gram(s) Oral two times a day  dextrose 5%. 1000 milliLiter(s) (50 mL/Hr) IV Continuous <Continuous>  dextrose 5%. 1000 milliLiter(s) (100 mL/Hr) IV Continuous <Continuous>  dextrose 50% Injectable 25 Gram(s) IV Push once  dextrose 50% Injectable 12.5 Gram(s) IV Push once  dextrose 50% Injectable 25 Gram(s) IV Push once  diphenoxylate/atropine 2 Tablet(s) Oral every 6 hours  glucagon  Injectable 1 milliGRAM(s) IntraMuscular once  heparin   Injectable 5000 Unit(s) SubCutaneous every 8 hours  imipenem/cilastatin  IVPB 500 milliGRAM(s) IV Intermittent every 8 hours  insulin lispro (ADMELOG) corrective regimen sliding scale   SubCutaneous every 6 hours  levothyroxine 50 MICROGram(s) Oral daily  lipid, fat emulsion (Fish Oil and Plant Based) 20% Infusion 1 Gm/kG/Day (41.67 mL/Hr) IV Continuous <Continuous>  loperamide 4 milliGRAM(s) Oral every 6 hours  metoprolol tartrate 25 milliGRAM(s) Oral every 12 hours  nystatin Powder 1 Application(s) Topical three times a day  octreotide  Injectable 100 MICROGram(s) IV Push every 8 hours  pantoprazole    Tablet 40 milliGRAM(s) Oral before breakfast  Parenteral Nutrition - Adult 1 Each (104 mL/Hr) TPN Continuous <Continuous>  Parenteral Nutrition - Adult 1 Each (104 mL/Hr) TPN Continuous <Continuous>    MEDICATIONS  (PRN):  benzocaine/menthol Lozenge 2 Lozenge Oral every 4 hours PRN Sore Throat  dextrose Oral Gel 15 Gram(s) Oral once PRN Blood Glucose LESS THAN 70 milliGRAM(s)/deciliter  ondansetron Injectable 4 milliGRAM(s) IV Push every 8 hours PRN Nausea and/or Vomiting      Vital Signs Last 24 Hrs  T(C): 36.6 (15 Sep 2023 10:00), Max: 36.8 (14 Sep 2023 18:50)  T(F): 97.8 (15 Sep 2023 10:00), Max: 98.2 (14 Sep 2023 18:50)  HR: 77 (15 Sep 2023 16:00) (75 - 80)  BP: 182/77 (15 Sep 2023 16:00) (141/65 - 182/77)  BP(mean): 110 (15 Sep 2023 16:00) (94 - 112)  RR: 17 (15 Sep 2023 16:00) (13 - 32)  SpO2: 94% (15 Sep 2023 16:00) (93% - 100%)    Parameters below as of 15 Sep 2023 17:00  Patient On (Oxygen Delivery Method): room air      Physical Exam  General: Patient is doing well and lying in bed comfortably  Constitutional: alert and oriented; feels very well this morning  Pulm: Nonlabored breathing, no respiratory distress  CV: Regular rate and rhythm, normal sinus rhythm  Abd: soft, nontender, nondistended. No rebound, no guarding. Midline abdominal wound with wound vac with noted leakd. ECF and Ileostomy with overlying ostomy appliances. JOYCE x 1 with enteric content. Perc Deb with bilious output.  Extremities: warm, well perfused, no edema    I&O's Summary    14 Sep 2023 07:01  -  15 Sep 2023 07:00  --------------------------------------------------------  IN: 3219.6 mL / OUT: 4240 mL / NET: -1020.4 mL    15 Sep 2023 07:01  -  15 Sep 2023 16:53  --------------------------------------------------------  IN: 874.1 mL / OUT: 1940 mL / NET: -1065.9 mL        LABS:                        9.0    13.28 )-----------( 258      ( 14 Sep 2023 05:35 )             27.9     09-14    138  |  105  |  35<H>  ----------------------------<  107<H>  4.0   |  23  |  1.35<H>    Ca    8.4      14 Sep 2023 05:35  Phos  2.9     09-14  Mg     2.0     09-14    TPro  7.7  /  Alb  2.0<L>  /  TBili  3.4<H>  /  DBili  x   /  AST  52<H>  /  ALT  33  /  AlkPhos  155<H>  09-14      Urinalysis Basic - ( 14 Sep 2023 05:35 )    Color: x / Appearance: x / SG: x / pH: x  Gluc: 107 mg/dL / Ketone: x  / Bili: x / Urobili: x   Blood: x / Protein: x / Nitrite: x   Leuk Esterase: x / RBC: x / WBC x   Sq Epi: x / Non Sq Epi: x / Bacteria: x      CAPILLARY BLOOD GLUCOSE      POCT Blood Glucose.: 89 mg/dL (15 Sep 2023 12:10)  POCT Blood Glucose.: 103 mg/dL (15 Sep 2023 05:43)  POCT Blood Glucose.: 106 mg/dL (14 Sep 2023 23:54)  POCT Blood Glucose.: 97 mg/dL (14 Sep 2023 17:09)    LIVER FUNCTIONS - ( 14 Sep 2023 05:35 )  Alb: 2.0 g/dL / Pro: 7.7 g/dL / ALK PHOS: 155 U/L / ALT: 33 U/L / AST: 52 U/L / GGT: x             RADIOLOGY & ADDITIONAL STUDIES:

## 2023-09-15 NOTE — PROGRESS NOTE ADULT - ASSESSMENT
78M w/ Crohn's, AFib/Flutter s/p DCCVs on amiodarone, remote ileocectomy and open appy here for SBO vs Crohns flare, s/p NGT decompression and now s/p lap TRE converted to open TRE, SBR x 3, left in discontinuity with abthera vac on 6/27, RTOR for ileocolic resection, small bowel anastomosis, and abdominal wall closure on 6/28, and s/p IR aspiration of perihepatic fluid on 7/3, stepped down to telemetry on 7/4. wound dehiscence on 7/5, RTOR ex-lap, washout, ileocolic resection with end ileostomy, blow hole colostomy, red rubber from ileostomy to small bowel anastomosis; Vicryl bridging mesh on 7/5. Transferred to SICU post op for HD monitoring. Extubated 7/6. Surgical wound with decreasing in size and granulating with Vac. Tentative plan for skin graft per Plastics; timing TBD pending proper wound shrinkage and granulation   8/23: Upgraded to SICU 8/23 for acute delirium and tremors, r/o sepsis vs metabolic encephalopathy. Ammonia levels normal    8/25: Spiked temp to 101.3 overnight w/ new leukocytosis to 14   8/28: Downgraded from SICU. No growth from blood cultures. Abx x 5 days until 8/29 8/30: Leukocytosis resolved   9/1: Decreased UOP, soft pressures. Cr 3.08 from 1.12. C/f dehydration 2/2 high fistula output vs ELVI   9/2: Upgraded to SICU for worsening ELVI. Stepped down 9/6   9/10: Upgraded to SICU for AMS, hypoglycemia and A-fib.   9/11: In setting of transaminitis, hyperbilirubinemia, leukocytosis and GB distention, underwent IR perc cony     Hemodynamically normal and afebrile. Improved mental status. Low fistula output.    Plan:   - NPO/IVF/TPN   - Wound vac change twice weekly and PRN for significant leakage. Next change today    - Monitor output from drains; replete PRN for high output    - PRN pain and nausea control   - Hx of A-fib/flutter; in NSR on rate control. Appreciate Cards/EP input   - Appreciate Neuro input   - Appreciate ID input   - DVT ppx   - OOB/PT  - Tentative plan for skin graft in 2 - 3 weeks per Plastics    D/w SICU, chief resident and attending

## 2023-09-16 NOTE — PROGRESS NOTE ADULT - SUBJECTIVE AND OBJECTIVE BOX
78 M w/ Crohn's, AFib/Flutter s/p DCCVs on amiodarone, remote ileocectomy and open appendectomy. Admitted (6/23) for SBO vs Crohns flare, s/p NGT decompression and s/p lap TRE converted to open TRE, SBR x 3, left in discontinuity with abthera vac on (6/27), RTOR for ileocolic resection, small bowel anastomosis, and abdominal wall closure on (6/28), c/b fluid collection s/p IR aspiration of perihepatic fluid on (7/3), c/b wound dehiscence s/p RTOR exlap, washout, ileocolic resection with end ileostomy, blow hole colostomy, red rubber from ileostomy to small bowel anastomosis; vicryl bridging mesh on (7/5) transferred to SICU postoperatively for hemodynamic monitoring, with hospital course complicated by periods of AMS most recently on 9/1 and associated with oliguria, severe ELVI and hyponatremia, which resolved but now stepped back up for to SICU on 9/10 for acute AMS, intermittent hypoglycemia, AFib with RVR. Started on precedex for agitation overnight. CT abd 9/10 showing distended gallbladder with sludge. SP IR percutaneous cholecystostomy placement 9/11/23.    Perc cony: 410cc dark bilious outpt/24 hr, day prior 555cc. Dressing cdi. Flushed easily with 3cc NS. Continue to monitor. IR will continue to follow.

## 2023-09-16 NOTE — PROGRESS NOTE ADULT - SUBJECTIVE AND OBJECTIVE BOX
INTERVAL HPI/OVERNIGHT EVENTS:    STATUS POST:      POST OPERATIVE DAY #:     SUBJECTIVE:      heparin   Injectable 5000 Unit(s) SubCutaneous every 8 hours  metoprolol tartrate 25 milliGRAM(s) Oral every 12 hours      Vital Signs Last 24 Hrs  T(C): 36.4 (15 Sep 2023 22:23), Max: 36.8 (15 Sep 2023 05:58)  T(F): 97.6 (15 Sep 2023 22:23), Max: 98.2 (15 Sep 2023 05:58)  HR: 81 (16 Sep 2023 05:00) (76 - 81)  BP: 142/69 (16 Sep 2023 05:00) (130/67 - 187/79)  BP(mean): 99 (16 Sep 2023 05:00) (86 - 129)  RR: 15 (16 Sep 2023 05:00) (8 - 28)  SpO2: 95% (16 Sep 2023 05:00) (93% - 99%)    Parameters below as of 16 Sep 2023 05:00  Patient On (Oxygen Delivery Method): room air      I&O's Detail    14 Sep 2023 07:01  -  15 Sep 2023 07:00  --------------------------------------------------------  IN:    Fat Emulsion (Fish Oil &amp; Plant Based) 20% Infusion: 42.1 mL    Fat Emulsion (Fish Oil &amp; Plant Based) 20% Infusion: 631.5 mL    IV PiggyBack: 50 mL    TPN (Total Parenteral Nutrition): 2496 mL  Total IN: 3219.6 mL    OUT:    Bulb (mL): 20 mL    Drain (mL): 555 mL    Drain (mL): 0 mL    Drain (mL): 585 mL    Ileostomy (mL): 90 mL    VAC (Vacuum Assisted Closure) System (mL): 90 mL    Voided (mL): 2900 mL  Total OUT: 4240 mL    Total NET: -1020.4 mL      15 Sep 2023 07:01  -  16 Sep 2023 05:39  --------------------------------------------------------  IN:    Fat Emulsion (Fish Oil &amp; Plant Based) 20% Infusion: 42.1 mL    Fat Emulsion (Fish Oil &amp; Plant Based) 20% Infusion: 500.4 mL    IV PiggyBack: 1000 mL    TPN (Total Parenteral Nutrition): 2184 mL  Total IN: 3726.5 mL    OUT:    Bulb (mL): 50 mL    Drain (mL): 70 mL    Drain (mL): 330 mL    Drain (mL): 550 mL    VAC (Vacuum Assisted Closure) System (mL): 75 mL    Voided (mL): 3125 mL  Total OUT: 4200 mL    Total NET: -473.5 mL          General: NAD, resting comfortably in bed  C/V: NSR  Pulm: Nonlabored breathing, no respiratory distress  Abd: soft, NT/ND.  Extrem: WWP, no edema, SCDs in place  Drains:  Virgilio:      LABS:                        9.3    12.79 )-----------( 264      ( 16 Sep 2023 05:20 )             30.0                 RADIOLOGY & ADDITIONAL STUDIES:   INTERVAL HPI/OVERNIGHT EVENTS: Vac leak fixed. TPN reordered. Hypertensive got labetalol 10 mg    SUBJECTIVE: Patient seen and examined at bedside with chief resident. He states that he is feeling well with no acute complaints. He denies chest pain, shortness of breath, nausea, and vomiting.      heparin   Injectable 5000 Unit(s) SubCutaneous every 8 hours  metoprolol tartrate 25 milliGRAM(s) Oral every 12 hours      Vital Signs Last 24 Hrs  T(C): 36.4 (15 Sep 2023 22:23), Max: 36.8 (15 Sep 2023 05:58)  T(F): 97.6 (15 Sep 2023 22:23), Max: 98.2 (15 Sep 2023 05:58)  HR: 81 (16 Sep 2023 05:00) (76 - 81)  BP: 142/69 (16 Sep 2023 05:00) (130/67 - 187/79)  BP(mean): 99 (16 Sep 2023 05:00) (86 - 129)  RR: 15 (16 Sep 2023 05:00) (8 - 28)  SpO2: 95% (16 Sep 2023 05:00) (93% - 99%)    Parameters below as of 16 Sep 2023 05:00  Patient On (Oxygen Delivery Method): room air      I&O's Detail    14 Sep 2023 07:01  -  15 Sep 2023 07:00  --------------------------------------------------------  IN:    Fat Emulsion (Fish Oil &amp; Plant Based) 20% Infusion: 42.1 mL    Fat Emulsion (Fish Oil &amp; Plant Based) 20% Infusion: 631.5 mL    IV PiggyBack: 50 mL    TPN (Total Parenteral Nutrition): 2496 mL  Total IN: 3219.6 mL    OUT:    Bulb (mL): 20 mL    Drain (mL): 555 mL    Drain (mL): 0 mL    Drain (mL): 585 mL    Ileostomy (mL): 90 mL    VAC (Vacuum Assisted Closure) System (mL): 90 mL    Voided (mL): 2900 mL  Total OUT: 4240 mL    Total NET: -1020.4 mL      15 Sep 2023 07:01  -  16 Sep 2023 05:39  --------------------------------------------------------  IN:    Fat Emulsion (Fish Oil &amp; Plant Based) 20% Infusion: 42.1 mL    Fat Emulsion (Fish Oil &amp; Plant Based) 20% Infusion: 500.4 mL    IV PiggyBack: 1000 mL    TPN (Total Parenteral Nutrition): 2184 mL  Total IN: 3726.5 mL    OUT:    Bulb (mL): 50 mL    Drain (mL): 70 mL    Drain (mL): 330 mL    Drain (mL): 550 mL    VAC (Vacuum Assisted Closure) System (mL): 75 mL    Voided (mL): 3125 mL  Total OUT: 4200 mL    Total NET: -473.5 mL          General: NAD, resting comfortably in bed  C/V: NSR  Pulm: Nonlabored breathing, no respiratory distress  Abd: wpund vac in place, soft, nontender  Extrem: WWP, no edema, SCDs in place      LABS:                        9.3    12.79 )-----------( 264      ( 16 Sep 2023 05:20 )             30.0

## 2023-09-16 NOTE — PROGRESS NOTE ADULT - SUBJECTIVE AND OBJECTIVE BOX
INTERVAL/OVERNIGHT EVENTS:    SUBJECTIVE:     POD #  SICU Day #    Neurologic Medications  melatonin 5 milliGRAM(s) Oral at bedtime  ondansetron Injectable 4 milliGRAM(s) IV Push every 8 hours PRN Nausea and/or Vomiting    Respiratory Medications    Cardiovascular Medications  metoprolol tartrate 25 milliGRAM(s) Oral every 12 hours    Gastrointestinal Medications  dextrose 5%. 1000 milliLiter(s) IV Continuous <Continuous>  dextrose 5%. 1000 milliLiter(s) IV Continuous <Continuous>  lipid, fat emulsion (Fish Oil and Plant Based) 20% Infusion 1 Gm/kG/Day IV Continuous <Continuous>  loperamide 4 milliGRAM(s) Oral every 6 hours  pantoprazole    Tablet 40 milliGRAM(s) Oral before breakfast  Parenteral Nutrition - Adult 1 Each TPN Continuous <Continuous>  Parenteral Nutrition - Adult 1 Each TPN Continuous <Continuous>    Genitourinary Medications    Hematologic/Oncologic Medications  heparin   Injectable 5000 Unit(s) SubCutaneous every 8 hours    Antimicrobial/Immunologic Medications    Endocrine/Metabolic Medications  cholestyramine Powder (Sugar-Free) 4 Gram(s) Oral two times a day  dextrose 50% Injectable 25 Gram(s) IV Push once  dextrose 50% Injectable 12.5 Gram(s) IV Push once  dextrose 50% Injectable 25 Gram(s) IV Push once  dextrose Oral Gel 15 Gram(s) Oral once PRN Blood Glucose LESS THAN 70 milliGRAM(s)/deciliter  glucagon  Injectable 1 milliGRAM(s) IntraMuscular once  insulin lispro (ADMELOG) corrective regimen sliding scale   SubCutaneous every 6 hours  levothyroxine 50 MICROGram(s) Oral daily    Topical/Other Medications  benzocaine/menthol Lozenge 2 Lozenge Oral every 4 hours PRN Sore Throat  chlorhexidine 2% Cloths 1 Application(s) Topical <User Schedule>  nystatin Powder 1 Application(s) Topical three times a day      MEDICATIONS  (PRN):  benzocaine/menthol Lozenge 2 Lozenge Oral every 4 hours PRN Sore Throat  dextrose Oral Gel 15 Gram(s) Oral once PRN Blood Glucose LESS THAN 70 milliGRAM(s)/deciliter  ondansetron Injectable 4 milliGRAM(s) IV Push every 8 hours PRN Nausea and/or Vomiting      I&O's Detail    15 Sep 2023 07:01  -  16 Sep 2023 07:00  --------------------------------------------------------  IN:    Fat Emulsion (Fish Oil &amp; Plant Based) 20% Infusion: 42.1 mL    Fat Emulsion (Fish Oil &amp; Plant Based) 20% Infusion: 583.8 mL    IV PiggyBack: 1000 mL    TPN (Total Parenteral Nutrition): 2392 mL  Total IN: 4017.9 mL    OUT:    Bulb (mL): 70 mL    Drain (mL): 570 mL    Drain (mL): 410 mL    Drain (mL): 120 mL    VAC (Vacuum Assisted Closure) System (mL): 150 mL    Voided (mL): 3125 mL  Total OUT: 4445 mL    Total NET: -427.1 mL      16 Sep 2023 07:01  -  16 Sep 2023 14:24  --------------------------------------------------------  IN:    TPN (Total Parenteral Nutrition): 624 mL  Total IN: 624 mL    OUT:    Drain (mL): 125 mL    Drain (mL): 150 mL    Voided (mL): 975 mL  Total OUT: 1250 mL    Total NET: -626 mL          Vital Signs Last 24 Hrs  T(C): 37.6 (16 Sep 2023 14:19), Max: 37.6 (16 Sep 2023 14:19)  T(F): 99.7 (16 Sep 2023 14:19), Max: 99.7 (16 Sep 2023 14:19)  HR: 79 (16 Sep 2023 13:00) (76 - 84)  BP: 169/79 (16 Sep 2023 13:00) (130/67 - 187/79)  BP(mean): 113 (16 Sep 2023 13:00) (86 - 129)  RR: 18 (16 Sep 2023 13:00) (8 - 28)  SpO2: 99% (16 Sep 2023 13:00) (94% - 100%)    Parameters below as of 16 Sep 2023 13:00  Patient On (Oxygen Delivery Method): room air        GENERAL: NAD, resting comfortably in bed  HEENT: NCAT, MMM  C/V: Normal rate, normal peripheral perfusion  PULM: Nonlabored breathing, no respiratory distress,   ABD: Soft, ND, NT, no rebound tenderness, no guarding  EXTREM: WWP, no edema, SCDs in place  NEURO: No focal deficits    LABS:                        9.3    12.79 )-----------( 264      ( 16 Sep 2023 05:20 )             30.0     09-16    134<L>  |  103  |  35<H>  ----------------------------<  106<H>  4.7   |  24  |  1.20    Ca    8.7      16 Sep 2023 05:20  Phos  3.1     09-16  Mg     1.9     09-16    TPro  7.9  /  Alb  2.1<L>  /  TBili  3.1<H>  /  DBili  2.1<H>  /  AST  66<H>  /  ALT  41  /  AlkPhos  186<H>  09-16      Urinalysis Basic - ( 16 Sep 2023 05:20 )    Color: x / Appearance: x / SG: x / pH: x  Gluc: 106 mg/dL / Ketone: x  / Bili: x / Urobili: x   Blood: x / Protein: x / Nitrite: x   Leuk Esterase: x / RBC: x / WBC x   Sq Epi: x / Non Sq Epi: x / Bacteria: x        RADIOLOGY & ADDITIONAL STUDIES:      Culture - Body Fluid with Gram Stain (collected 09-11-23 @ 15:00)  Source: Bile bile from gallbladder  Gram Stain (09-11-23 @ 18:20):    No organisms seen    No WBC's seen.  Final Report (09-16-23 @ 11:58):    No growth     INTERVAL/OVERNIGHT EVENTS: NAOE.     SUBJECTIVE: This AM is doing well without complaints. No pain or N/V or abd pain. No CP/SOB.    Neurologic Medications  melatonin 5 milliGRAM(s) Oral at bedtime  ondansetron Injectable 4 milliGRAM(s) IV Push every 8 hours PRN Nausea and/or Vomiting    Cardiovascular Medications  metoprolol tartrate 25 milliGRAM(s) Oral every 12 hours    Gastrointestinal Medications  lipid, fat emulsion (Fish Oil and Plant Based) 20% Infusion 1 Gm/kG/Day IV Continuous <Continuous>  loperamide 4 milliGRAM(s) Oral every 6 hours  pantoprazole    Tablet 40 milliGRAM(s) Oral before breakfast  Parenteral Nutrition - Adult 1 Each TPN Continuous <Continuous>    Hematologic/Oncologic Medications  heparin   Injectable 5000 Unit(s) SubCutaneous every 8 hours    Endocrine/Metabolic Medications  cholestyramine Powder (Sugar-Free) 4 Gram(s) Oral two times a day  glucagon  Injectable 1 milliGRAM(s) IntraMuscular once  insulin lispro (ADMELOG) corrective regimen sliding scale   SubCutaneous every 6 hours  levothyroxine 50 MICROGram(s) Oral daily    Topical/Other Medications  benzocaine/menthol Lozenge 2 Lozenge Oral every 4 hours PRN Sore Throat  chlorhexidine 2% Cloths 1 Application(s) Topical <User Schedule>  nystatin Powder 1 Application(s) Topical three times a day    MEDICATIONS  (PRN):  benzocaine/menthol Lozenge 2 Lozenge Oral every 4 hours PRN Sore Throat  dextrose Oral Gel 15 Gram(s) Oral once PRN Blood Glucose LESS THAN 70 milliGRAM(s)/deciliter  ondansetron Injectable 4 milliGRAM(s) IV Push every 8 hours PRN Nausea and/or Vomiting    I&O's Detail    15 Sep 2023 07:01  -  16 Sep 2023 07:00  --------------------------------------------------------  IN:    Fat Emulsion (Fish Oil &amp; Plant Based) 20% Infusion: 42.1 mL    Fat Emulsion (Fish Oil &amp; Plant Based) 20% Infusion: 583.8 mL    IV PiggyBack: 1000 mL    TPN (Total Parenteral Nutrition): 2392 mL  Total IN: 4017.9 mL    OUT:    Bulb (mL): 70 mL    Drain (mL): 570 mL    Drain (mL): 410 mL    Drain (mL): 120 mL    VAC (Vacuum Assisted Closure) System (mL): 150 mL    Voided (mL): 3125 mL  Total OUT: 4445 mL    Total NET: -427.1 mL    16 Sep 2023 07:01  -  16 Sep 2023 14:24  --------------------------------------------------------  IN:    TPN (Total Parenteral Nutrition): 624 mL  Total IN: 624 mL    OUT:    Drain (mL): 125 mL    Drain (mL): 150 mL    Voided (mL): 975 mL  Total OUT: 1250 mL    Total NET: -626 mL    Vital Signs Last 24 Hrs  T(C): 37.6 (16 Sep 2023 14:19), Max: 37.6 (16 Sep 2023 14:19)  T(F): 99.7 (16 Sep 2023 14:19), Max: 99.7 (16 Sep 2023 14:19)  HR: 79 (16 Sep 2023 13:00) (76 - 84)  BP: 169/79 (16 Sep 2023 13:00) (130/67 - 187/79)  BP(mean): 113 (16 Sep 2023 13:00) (86 - 129)  RR: 18 (16 Sep 2023 13:00) (8 - 28)  SpO2: 99% (16 Sep 2023 13:00) (94% - 100%)    Parameters below as of 16 Sep 2023 13:00  Patient On (Oxygen Delivery Method): room air    GENERAL: NAD, resting comfortably in bed  HEENT: NCAT, MMM  C/V: Normal rate, normal peripheral perfusion, no murmmurs  PULM: Nonlabored breathing, no respiratory distress, CTA b/l  ABD: Soft, ND, NT, no rebound tenderness, no guarding, vac in place, fistul bag in place to low suction with no appreciated leaks, ostomy bag over blow hole with mccauley in place with no appreciated leaks   EXTREM: WWP, no edema, SCDs in place  NEURO: No focal deficits    LABS:                        9.3    12.79 )-----------( 264      ( 16 Sep 2023 05:20 )             30.0     09-16    134<L>  |  103  |  35<H>  ----------------------------<  106<H>  4.7   |  24  |  1.20    Ca    8.7      16 Sep 2023 05:20  Phos  3.1     09-16  Mg     1.9     09-16    TPro  7.9  /  Alb  2.1<L>  /  TBili  3.1<H>  /  DBili  2.1<H>  /  AST  66<H>  /  ALT  41  /  AlkPhos  186<H>  09-16    Urinalysis Basic - ( 16 Sep 2023 05:20 )    Color: x / Appearance: x / SG: x / pH: x  Gluc: 106 mg/dL / Ketone: x  / Bili: x / Urobili: x   Blood: x / Protein: x / Nitrite: x   Leuk Esterase: x / RBC: x / WBC x   Sq Epi: x / Non Sq Epi: x / Bacteria: x    RADIOLOGY & ADDITIONAL STUDIES:    Culture - Body Fluid with Gram Stain (collected 09-11-23 @ 15:00)  Source: Bile bile from gallbladder  Gram Stain (09-11-23 @ 18:20):    No organisms seen    No WBC's seen.  Final Report (09-16-23 @ 11:58):    No growth

## 2023-09-16 NOTE — PROGRESS NOTE ADULT - ASSESSMENT
78 M w/ Crohn's, AFib/Flutter s/p DCCVs on amiodarone, remote ileocectomy and open appendectomy. Admitted (6/23) for SBO vs Crohns flare, s/p NGT decompression and s/p lap TRE converted to open TRE, SBR x 3, left in discontinuity with abthera vac on (6/27), RTOR for ileocolic resection, small bowel anastomosis, and abdominal wall closure on (6/28), c/b fluid collection s/p IR aspiration of perihepatic fluid on (7/3), c/b wound dehiscence s/p RTOR exlap, washout, ileocolic resection with end ileostomy, blow hole colostomy, red rubber from ileostomy to small bowel anastomosis; vicryl bridging mesh on (7/5) transferred to SICU postoperatively for hemodynamic monitoring, with hospital course complicated by periods of AMS most recently on 9/1 and associated with oliguria, severe ELVI and hyponatremia, which resolved but now stepped back up for to SICU on 9/10 for acute AMS, intermittent hypoglycemia, AFib with RVR. Perc Choley tube placed on 9/11 for enlarged GB.     Plan:   - NPO/IVF/TPN   - Wound vac change twice weekly and PRN for significant leakage. Next change today    - Monitor output from drains; replete PRN for high output    - PRN pain and nausea control   - Hx of A-fib/flutter; in NSR on rate control. Appreciate Cards/EP input   - Appreciate Neuro input   - Appreciate ID input   - DVT ppx   - OOB/PT  - Tentative plan for skin graft in 2 - 3 weeks per Plastics

## 2023-09-16 NOTE — PROGRESS NOTE ADULT - ASSESSMENT
78 M w/ Crohn's, AFib/Flutter s/p DCCVs on amiodarone, remote ileocectomy and open appendectomy. Admitted (6/23) for SBO vs Crohns flare, s/p NGT decompression and s/p lap TRE converted to open TRE, SBR x 3, left in discontinuity with abthera vac on (6/27), RTOR for ileocolic resection, small bowel anastomosis, and abdominal wall closure on (6/28), c/b fluid collection s/p IR aspiration of perihepatic fluid on (7/3), c/b wound dehiscence s/p RTOR exlap, washout, ileocolic resection with end ileostomy, blow hole colostomy, red rubber from ileostomy to small bowel anastomosis; vicryl bridging mesh on (7/5) transferred to SICU postoperatively for hemodynamic monitoring, with hospital course complicated by periods of AMS most recently on 9/1 and associated with oliguria, severe ELVI and hyponatremia, which resolved but now stepped back up for to SICU on 9/10 for acute AMS, intermittent hypoglycemia, AFib with RVR. Perc Choley tube placed on 9/11 for enlarged GB.     NEURO: AMS and agitation (resolved) off Seroquel, off Precedex. EEG neg. Hx of depression - effexor Dc'd on 9/10 due to twitching C/F serotinin-syndrome symptoms. Pain control with Dilaudid for vac changes only.   HENT: Cepacol  CV: Clinically appears euvolemic. Episode of Afib with RVR, resolved with 5mg IVP Metoprolol, Hx Afib/flutter: s/p DCCV, Off amiodarone given transaminitis and elevated T bili; Continue Metoprolol 25q12. New ST Depressions on EKG with AFib and Trop 56 - elevated trops due to Afib with RVR, ; Hx HTN - Holding Norvasc, Losartan. Hx HLD: hold Lipitor given LFTs. TTE  (7/18) - PASP 64mmHg, EF 65-70%,   PULM: Saturating well on RA.   GI/FEN: NPO with sips of gatorade; Imodium/Lomotril and added octreotide in TPN for high ostomy and ECF output. PICC/TPN; transaminitis of unknown origin, distended gallbladder on CT 8/25, RUQ US negative. Distended GB s/p Perc Choley. PPI.   : Voids, previous severe ELVI with BUN increase to 35. Nephrology recommendations appreciated.   ENDO:  Hx hypothyroid: IV Synthroid, TSH 2.180 (9.170), Recurrent Hypoglycemia on TPN  ID: Numerous SICU admissions from sepsis, most recently with concern for line sepsis. Now WBC dowtrended. Currently off abx. Imipenem (9/10-9/15) // CTAP (8/25) without fluid collections, distended GB, atelectasis. Imipenem (8/26--). Previously had C. tertium, Lactobacillis from his IR cx 7/3, and candida albicans, lactobacillus, vanc sensitive E faecium, vanc resistant E gallinarum, vanc resistant E casseliflavus, lactobacillus from his OR cx 7/5. Completed course of abx with imipenem (6/30-7/12, 7/23-7/30), Dapto (6/30-7/5 and 7/23-7/24). been off all abx since 7/30. empiric dapto (8/23-24) and cefepime (8/23-24).   PPX: SCDs, SQH   LINES: PIVs, LUE PICC (9/1 - )  WOUNDS/DRAINS: ECF abdominal wound with vac change Mon/Fri--next on 9/14. PT: Home health PT  DISPO: SICU

## 2023-09-17 NOTE — PROGRESS NOTE ADULT - SUBJECTIVE AND OBJECTIVE BOX
INTERVAL/OVERNIGHT EVENTS: NAEO. Vac reinforced.    SUBJECTIVE:  This AM is doing well without complaints. No pain or N/V or abd pain. No CP/SOB. Vac is beeping.    Neurologic Medications  melatonin 5 milliGRAM(s) Oral at bedtime  ondansetron Injectable 4 milliGRAM(s) IV Push every 8 hours PRN Nausea and/or Vomiting    Cardiovascular Medications  metoprolol tartrate 25 milliGRAM(s) Oral every 12 hours    Gastrointestinal Medications  lipid, fat emulsion (Fish Oil and Plant Based) 20% Infusion 1 Gm/kG/Day IV Continuous <Continuous>  loperamide 4 milliGRAM(s) Oral every 6 hours  pantoprazole    Tablet 40 milliGRAM(s) Oral before breakfast  Parenteral Nutrition - Adult 1 Each TPN Continuous <Continuous>    Hematologic/Oncologic Medications  heparin   Injectable 5000 Unit(s) SubCutaneous every 8 hours    Endocrine/Metabolic Medications  cholestyramine Powder (Sugar-Free) 4 Gram(s) Oral two times a day  glucagon  Injectable 1 milliGRAM(s) IntraMuscular once  insulin lispro (ADMELOG) corrective regimen sliding scale   SubCutaneous every 6 hours  levothyroxine 50 MICROGram(s) Oral daily    Topical/Other Medications  benzocaine/menthol Lozenge 2 Lozenge Oral every 4 hours PRN Sore Throat  chlorhexidine 2% Cloths 1 Application(s) Topical <User Schedule>  nystatin Powder 1 Application(s) Topical three times a day    MEDICATIONS  (PRN):  benzocaine/menthol Lozenge 2 Lozenge Oral every 4 hours PRN Sore Throat  dextrose Oral Gel 15 Gram(s) Oral once PRN Blood Glucose LESS THAN 70 milliGRAM(s)/deciliter  ondansetron Injectable 4 milliGRAM(s) IV Push every 8 hours PRN Nausea and/or Vomiting    I&O's Detail    16 Sep 2023 07:01  -  17 Sep 2023 07:00  --------------------------------------------------------  IN:    Fat Emulsion (Fish Oil &amp; Plant Based) 20% Infusion: 458.4 mL    TPN (Total Parenteral Nutrition): 2392 mL  Total IN: 2850.4 mL    OUT:    Bulb (mL): 50 mL    Drain (mL): 220 mL    Drain (mL): 325 mL    Drain (mL): 500 mL    VAC (Vacuum Assisted Closure) System (mL): 500 mL    Voided (mL): 2150 mL  Total OUT: 3745 mL    Total NET: -894.6 mL    17 Sep 2023 07:01  -  17 Sep 2023 13:15  --------------------------------------------------------  IN:    TPN (Total Parenteral Nutrition): 520 mL  Total IN: 520 mL    OUT:    Voided (mL): 400 mL  Total OUT: 400 mL    Total NET: 120 mL    Vital Signs Last 24 Hrs  T(C): 36.9 (17 Sep 2023 10:10), Max: 37.6 (16 Sep 2023 14:19)  T(F): 98.4 (17 Sep 2023 10:10), Max: 99.7 (16 Sep 2023 14:19)  HR: 80 (17 Sep 2023 13:00) (62 - 84)  BP: 153/71 (17 Sep 2023 13:00) (136/115 - 191/83)  BP(mean): 98 (17 Sep 2023 13:00) (92 - 123)  RR: 32 (17 Sep 2023 13:00) (15 - 32)  SpO2: 99% (17 Sep 2023 13:00) (91% - 100%)    Parameters below as of 17 Sep 2023 13:00  Patient On (Oxygen Delivery Method): room air    GENERAL: NAD, resting comfortably in bed  HEENT: NCAT, MMM  C/V: Normal rate, normal peripheral perfusion, no murmmurs  PULM: Nonlabored breathing, no respiratory distress, CTA b/l  ABD: Soft, ND, NT, no rebound tenderness, no guarding, vac in place, fistul bag in place to low suction with no appreciated leaks, ostomy bag over blow hole with mccauley in place with no appreciated leaks   EXTREM: WWP, no edema, SCDs in place  NEURO: No focal deficits    LABS:                        9.3    12.79 )-----------( 264      ( 16 Sep 2023 05:20 )             30.0     09-16    134<L>  |  103  |  35<H>  ----------------------------<  106<H>  4.7   |  24  |  1.20    Ca    8.7      16 Sep 2023 05:20  Phos  3.1     09-16  Mg     1.9     09-16    TPro  7.9  /  Alb  2.1<L>  /  TBili  3.1<H>  /  DBili  2.1<H>  /  AST  66<H>  /  ALT  41  /  AlkPhos  186<H>  09-16    Urinalysis Basic - ( 16 Sep 2023 05:20 )    Color: x / Appearance: x / SG: x / pH: x  Gluc: 106 mg/dL / Ketone: x  / Bili: x / Urobili: x   Blood: x / Protein: x / Nitrite: x   Leuk Esterase: x / RBC: x / WBC x   Sq Epi: x / Non Sq Epi: x / Bacteria: x

## 2023-09-17 NOTE — PROGRESS NOTE ADULT - ASSESSMENT
78 M w/ Crohn's, AFib/Flutter s/p DCCVs on amiodarone, remote ileocectomy and open appendectomy. Admitted (6/23) for SBO vs Crohns flare, s/p NGT decompression and s/p lap TRE converted to open TRE, SBR x 3, left in discontinuity with abthera vac on (6/27), RTOR for ileocolic resection, small bowel anastomosis, and abdominal wall closure on (6/28), c/b fluid collection s/p IR aspiration of perihepatic fluid on (7/3), c/b wound dehiscence s/p RTOR exlap, washout, ileocolic resection with end ileostomy, blow hole colostomy, red rubber from ileostomy to small bowel anastomosis; vicryl bridging mesh on (7/5) transferred to SICU postoperatively for hemodynamic monitoring, with hospital course complicated by periods of AMS most recently on 9/1 and associated with oliguria, severe ELVI and hyponatremia, which resolved but now stepped back up for to SICU on 9/10 for acute AMS, intermittent hypoglycemia, AFib with RVR. Perc Choley tube placed on 9/11 for enlarged GB. AMS now resolved.    NEURO: AMS/agitation resolved. EEG neg. Hx of depression - effexor Dc'd on 9/10. Pain control with Dilaudid for vac changes only.   HENT: Cepacol  CV: Clinically appears euvolemic. Episode of Afib with RVR, resolved with 5mg IVP Metoprolol, Hx Afib/flutter: s/p DCCV, Off amiodarone given transaminitis and elevated T bili; Continue Metoprolol PO. New ST Depressions on EKG with AFib and Trop 56 - elevated trops due to Afib with RVR, ; Hx HTN - Holding Norvasc, Losartan. Hx HLD: hold Lipitor given LFTs. TTE  (7/18) - PASP 64mmHg, EF 65-70%,   PULM: Saturating well on RA.   GI/FEN: NPO with sips of gatorade; Imodium/Lomotril and Octreotide in TPN for high ostomy and ECF output. PICC/TPN; transaminitis of unknown origin, distended gallbladder on CT 8/25, RUQ US negative. Distended GB s/p Perc Choley. PPI.   : Voids, previous severe ELVI with BUN increase 35 (25) Cr now downtrending, Ulytes and UA normal. Nephrology recommendations appreciated.   ENDO:  Hx hypothyroid: IV Synthroid, TSH 2.180 (9.170), Recurrent Hypoglycemia on TPN  ID: Numerous SICU admissions from sepsis, most recently with concern for line sepsis. Now WBC dowtrended. Currently off abx. Imipenem (9/10-9/15) // CTAP (8/25) without fluid collections, distended GB, atelectasis. Imipenem (8/26--). Previously had C. tertium, Lactobacillis from his IR cx 7/3, and candida albicans, lactobacillus, vanc sensitive E faecium, vanc resistant E gallinarum, vanc resistant E casseliflavus, lactobacillus from his OR cx 7/5. Completed course of abx with imipenem (6/30-7/12, 7/23-7/30), Dapto (6/30-7/5 and 7/23-7/24). been off all abx since 7/30. empiric dapto (8/23-24) and cefepime (8/23-24).   PPX: SCDs, SQH   LINES: PIVs, LUE PICC (9/1 - )  WOUNDS/DRAINS: ECF abdominal wound with vac change Mon/Fri--next on 9/14. PT: Home health PT  DISPO:

## 2023-09-17 NOTE — PROGRESS NOTE ADULT - SUBJECTIVE AND OBJECTIVE BOX
SUBJECTIVE:  Flatus: [ ] YES [ ] NO             Bowel Movement: [ ] YES [ ] NO  Pain (0-10):            Pain Control Adequate: [ ] YES [ ] NO  Nausea: [ ] YES [ ] NO            Vomiting: [ ] YES [ ] NO  Diarrhea: [ ] YES [ ] NO         Constipation: [ ] YES [ ] NO     Chest Pain: [ ] YES [ ] NO    SOB:  [ ] YES [ ] NO    MEDICATIONS  (STANDING):  chlorhexidine 2% Cloths 1 Application(s) Topical <User Schedule>  cholestyramine Powder (Sugar-Free) 4 Gram(s) Oral two times a day  dextrose 5%. 1000 milliLiter(s) (50 mL/Hr) IV Continuous <Continuous>  dextrose 5%. 1000 milliLiter(s) (100 mL/Hr) IV Continuous <Continuous>  dextrose 50% Injectable 25 Gram(s) IV Push once  dextrose 50% Injectable 12.5 Gram(s) IV Push once  dextrose 50% Injectable 25 Gram(s) IV Push once  glucagon  Injectable 1 milliGRAM(s) IntraMuscular once  heparin   Injectable 5000 Unit(s) SubCutaneous every 8 hours  insulin lispro (ADMELOG) corrective regimen sliding scale   SubCutaneous every 6 hours  levothyroxine 50 MICROGram(s) Oral daily  lipid, fat emulsion (Fish Oil and Plant Based) 20% Infusion 1 Gm/kG/Day (41.67 mL/Hr) IV Continuous <Continuous>  loperamide 4 milliGRAM(s) Oral every 6 hours  melatonin 5 milliGRAM(s) Oral at bedtime  metoprolol tartrate 25 milliGRAM(s) Oral every 12 hours  nystatin Powder 1 Application(s) Topical three times a day  pantoprazole    Tablet 40 milliGRAM(s) Oral before breakfast  Parenteral Nutrition - Adult 1 Each (104 mL/Hr) TPN Continuous <Continuous>    MEDICATIONS  (PRN):  benzocaine/menthol Lozenge 2 Lozenge Oral every 4 hours PRN Sore Throat  dextrose Oral Gel 15 Gram(s) Oral once PRN Blood Glucose LESS THAN 70 milliGRAM(s)/deciliter  ondansetron Injectable 4 milliGRAM(s) IV Push every 8 hours PRN Nausea and/or Vomiting      Vital Signs Last 24 Hrs  T(C): 36.5 (17 Sep 2023 05:31), Max: 37.6 (16 Sep 2023 14:19)  T(F): 97.7 (17 Sep 2023 05:31), Max: 99.7 (16 Sep 2023 14:19)  HR: 82 (17 Sep 2023 04:00) (62 - 84)  BP: 140/64 (17 Sep 2023 04:00) (137/65 - 191/83)  BP(mean): 92 (17 Sep 2023 04:00) (92 - 120)  RR: 17 (17 Sep 2023 04:00) (14 - 31)  SpO2: 97% (17 Sep 2023 04:00) (91% - 100%)    Parameters below as of 17 Sep 2023 04:00  Patient On (Oxygen Delivery Method): room air        Physical Exam:  General: NAD, resting comfortably in bed  Pulmonary: Nonlabored breathing, no respiratory distress  Cardiovascular: NSR  Abdominal: soft, NT/ND  Extremities: WWP, normal strength  Neuro: A/O x 3, CNs II-XII grossly intact, no focal deficits, normal motor/sensation  Pulses: palpable distal pulses    I&O's Summary    15 Sep 2023 07:01  -  16 Sep 2023 07:00  --------------------------------------------------------  IN: 4017.9 mL / OUT: 4445 mL / NET: -427.1 mL    16 Sep 2023 07:01  -  17 Sep 2023 06:37  --------------------------------------------------------  IN: 2746.4 mL / OUT: 2855 mL / NET: -108.6 mL        LABS:                        9.3    12.79 )-----------( 264      ( 16 Sep 2023 05:20 )             30.0     09-16    134<L>  |  103  |  35<H>  ----------------------------<  106<H>  4.7   |  24  |  1.20    Ca    8.7      16 Sep 2023 05:20  Phos  3.1     09-16  Mg     1.9     09-16    TPro  7.9  /  Alb  2.1<L>  /  TBili  3.1<H>  /  DBili  2.1<H>  /  AST  66<H>  /  ALT  41  /  AlkPhos  186<H>  09-16      Urinalysis Basic - ( 16 Sep 2023 05:20 )    Color: x / Appearance: x / SG: x / pH: x  Gluc: 106 mg/dL / Ketone: x  / Bili: x / Urobili: x   Blood: x / Protein: x / Nitrite: x   Leuk Esterase: x / RBC: x / WBC x   Sq Epi: x / Non Sq Epi: x / Bacteria: x      CAPILLARY BLOOD GLUCOSE      POCT Blood Glucose.: 98 mg/dL (17 Sep 2023 00:25)  POCT Blood Glucose.: 104 mg/dL (16 Sep 2023 17:20)  POCT Blood Glucose.: 92 mg/dL (16 Sep 2023 11:52)    LIVER FUNCTIONS - ( 16 Sep 2023 05:20 )  Alb: 2.1 g/dL / Pro: 7.9 g/dL / ALK PHOS: 186 U/L / ALT: 41 U/L / AST: 66 U/L / GGT: x             RADIOLOGY & ADDITIONAL STUDIES:       SUBJECTIVE:  resting well in bed this morning, mental status seems to be back at baseline currently. vac noticed to be leaking at the inferior midline area.     Pain (0-10):            Pain Control Adequate: [x ] YES [ ] NO  Nausea: [ ] YES [x ] NO            Vomiting: [ ] YES x[ ] NO  Diarrhea: [ ] YES [x ] NO         Constipation: [ ] YES [x ] NO     Chest Pain: [ ] YES [x ] NO    SOB:  [ ] YES [ x] NO    MEDICATIONS  (STANDING):  chlorhexidine 2% Cloths 1 Application(s) Topical <User Schedule>  cholestyramine Powder (Sugar-Free) 4 Gram(s) Oral two times a day  dextrose 5%. 1000 milliLiter(s) (50 mL/Hr) IV Continuous <Continuous>  dextrose 5%. 1000 milliLiter(s) (100 mL/Hr) IV Continuous <Continuous>  dextrose 50% Injectable 25 Gram(s) IV Push once  dextrose 50% Injectable 12.5 Gram(s) IV Push once  dextrose 50% Injectable 25 Gram(s) IV Push once  glucagon  Injectable 1 milliGRAM(s) IntraMuscular once  heparin   Injectable 5000 Unit(s) SubCutaneous every 8 hours  insulin lispro (ADMELOG) corrective regimen sliding scale   SubCutaneous every 6 hours  levothyroxine 50 MICROGram(s) Oral daily  lipid, fat emulsion (Fish Oil and Plant Based) 20% Infusion 1 Gm/kG/Day (41.67 mL/Hr) IV Continuous <Continuous>  loperamide 4 milliGRAM(s) Oral every 6 hours  melatonin 5 milliGRAM(s) Oral at bedtime  metoprolol tartrate 25 milliGRAM(s) Oral every 12 hours  nystatin Powder 1 Application(s) Topical three times a day  pantoprazole    Tablet 40 milliGRAM(s) Oral before breakfast  Parenteral Nutrition - Adult 1 Each (104 mL/Hr) TPN Continuous <Continuous>    MEDICATIONS  (PRN):  benzocaine/menthol Lozenge 2 Lozenge Oral every 4 hours PRN Sore Throat  dextrose Oral Gel 15 Gram(s) Oral once PRN Blood Glucose LESS THAN 70 milliGRAM(s)/deciliter  ondansetron Injectable 4 milliGRAM(s) IV Push every 8 hours PRN Nausea and/or Vomiting      Vital Signs Last 24 Hrs  T(C): 36.5 (17 Sep 2023 05:31), Max: 37.6 (16 Sep 2023 14:19)  T(F): 97.7 (17 Sep 2023 05:31), Max: 99.7 (16 Sep 2023 14:19)  HR: 82 (17 Sep 2023 04:00) (62 - 84)  BP: 140/64 (17 Sep 2023 04:00) (137/65 - 191/83)  BP(mean): 92 (17 Sep 2023 04:00) (92 - 120)  RR: 17 (17 Sep 2023 04:00) (14 - 31)  SpO2: 97% (17 Sep 2023 04:00) (91% - 100%)    Parameters below as of 17 Sep 2023 04:00  Patient On (Oxygen Delivery Method): room air        Physical Exam:  General: NAD, resting comfortably in bed  Pulmonary: Nonlabored breathing, no respiratory distress  Cardiovascular: NSR  Abdominal: soft, NT/ND, wound vac in place.    Extremities: WWP, normal strength  Neuro: A/O x 3, CNs II-XII grossly intact, no focal deficits, normal motor/sensation  Pulses: palpable distal pulses    I&O's Summary    15 Sep 2023 07:01  -  16 Sep 2023 07:00  --------------------------------------------------------  IN: 4017.9 mL / OUT: 4445 mL / NET: -427.1 mL    16 Sep 2023 07:01  -  17 Sep 2023 06:37  --------------------------------------------------------  IN: 2746.4 mL / OUT: 2855 mL / NET: -108.6 mL        LABS:                        9.3    12.79 )-----------( 264      ( 16 Sep 2023 05:20 )             30.0     09-16    134<L>  |  103  |  35<H>  ----------------------------<  106<H>  4.7   |  24  |  1.20    Ca    8.7      16 Sep 2023 05:20  Phos  3.1     09-16  Mg     1.9     09-16    TPro  7.9  /  Alb  2.1<L>  /  TBili  3.1<H>  /  DBili  2.1<H>  /  AST  66<H>  /  ALT  41  /  AlkPhos  186<H>  09-16      Urinalysis Basic - ( 16 Sep 2023 05:20 )    Color: x / Appearance: x / SG: x / pH: x  Gluc: 106 mg/dL / Ketone: x  / Bili: x / Urobili: x   Blood: x / Protein: x / Nitrite: x   Leuk Esterase: x / RBC: x / WBC x   Sq Epi: x / Non Sq Epi: x / Bacteria: x      CAPILLARY BLOOD GLUCOSE      POCT Blood Glucose.: 98 mg/dL (17 Sep 2023 00:25)  POCT Blood Glucose.: 104 mg/dL (16 Sep 2023 17:20)  POCT Blood Glucose.: 92 mg/dL (16 Sep 2023 11:52)    LIVER FUNCTIONS - ( 16 Sep 2023 05:20 )  Alb: 2.1 g/dL / Pro: 7.9 g/dL / ALK PHOS: 186 U/L / ALT: 41 U/L / AST: 66 U/L / GGT: x             RADIOLOGY & ADDITIONAL STUDIES:   SUBJECTIVE:  resting well in bed this morning, mental status seems to be back at baseline currently. vac noticed holding adequate suction .     MEDICATIONS  (STANDING):  chlorhexidine 2% Cloths 1 Application(s) Topical <User Schedule>  cholestyramine Powder (Sugar-Free) 4 Gram(s) Oral two times a day  dextrose 5%. 1000 milliLiter(s) (50 mL/Hr) IV Continuous <Continuous>  dextrose 5%. 1000 milliLiter(s) (100 mL/Hr) IV Continuous <Continuous>  dextrose 50% Injectable 25 Gram(s) IV Push once  dextrose 50% Injectable 12.5 Gram(s) IV Push once  dextrose 50% Injectable 25 Gram(s) IV Push once  glucagon  Injectable 1 milliGRAM(s) IntraMuscular once  heparin   Injectable 5000 Unit(s) SubCutaneous every 8 hours  insulin lispro (ADMELOG) corrective regimen sliding scale   SubCutaneous every 6 hours  levothyroxine 50 MICROGram(s) Oral daily  lipid, fat emulsion (Fish Oil and Plant Based) 20% Infusion 1 Gm/kG/Day (41.67 mL/Hr) IV Continuous <Continuous>  loperamide 4 milliGRAM(s) Oral every 6 hours  melatonin 5 milliGRAM(s) Oral at bedtime  metoprolol tartrate 25 milliGRAM(s) Oral every 12 hours  nystatin Powder 1 Application(s) Topical three times a day  pantoprazole    Tablet 40 milliGRAM(s) Oral before breakfast  Parenteral Nutrition - Adult 1 Each (104 mL/Hr) TPN Continuous <Continuous>    MEDICATIONS  (PRN):  benzocaine/menthol Lozenge 2 Lozenge Oral every 4 hours PRN Sore Throat  dextrose Oral Gel 15 Gram(s) Oral once PRN Blood Glucose LESS THAN 70 milliGRAM(s)/deciliter  ondansetron Injectable 4 milliGRAM(s) IV Push every 8 hours PRN Nausea and/or Vomiting      Vital Signs Last 24 Hrs  T(C): 36.5 (17 Sep 2023 05:31), Max: 37.6 (16 Sep 2023 14:19)  T(F): 97.7 (17 Sep 2023 05:31), Max: 99.7 (16 Sep 2023 14:19)  HR: 82 (17 Sep 2023 04:00) (62 - 84)  BP: 140/64 (17 Sep 2023 04:00) (137/65 - 191/83)  BP(mean): 92 (17 Sep 2023 04:00) (92 - 120)  RR: 17 (17 Sep 2023 04:00) (14 - 31)  SpO2: 97% (17 Sep 2023 04:00) (91% - 100%)    Parameters below as of 17 Sep 2023 04:00  Patient On (Oxygen Delivery Method): room air    Physical Exam:  General: NAD, resting comfortably in bed  Pulmonary: Nonlabored breathing, no respiratory distress  Cardiovascular: NSR  Abdominal: soft, NT/ND, wound vac in place.    Extremities: WWP, normal strength  Neuro: A/O x 3, CNs II-XII grossly intact, no focal deficits, normal motor/sensation  Pulses: palpable distal pulses    I&O's Summary    15 Sep 2023 07:01  -  16 Sep 2023 07:00  --------------------------------------------------------  IN: 4017.9 mL / OUT: 4445 mL / NET: -427.1 mL    16 Sep 2023 07:01  -  17 Sep 2023 06:37  --------------------------------------------------------  IN: 2746.4 mL / OUT: 2855 mL / NET: -108.6 mL        LABS:                        9.3    12.79 )-----------( 264      ( 16 Sep 2023 05:20 )             30.0     09-16    134<L>  |  103  |  35<H>  ----------------------------<  106<H>  4.7   |  24  |  1.20    Ca    8.7      16 Sep 2023 05:20  Phos  3.1     09-16  Mg     1.9     09-16    TPro  7.9  /  Alb  2.1<L>  /  TBili  3.1<H>  /  DBili  2.1<H>  /  AST  66<H>  /  ALT  41  /  AlkPhos  186<H>  09-16      Urinalysis Basic - ( 16 Sep 2023 05:20 )    Color: x / Appearance: x / SG: x / pH: x  Gluc: 106 mg/dL / Ketone: x  / Bili: x / Urobili: x   Blood: x / Protein: x / Nitrite: x   Leuk Esterase: x / RBC: x / WBC x   Sq Epi: x / Non Sq Epi: x / Bacteria: x      CAPILLARY BLOOD GLUCOSE      POCT Blood Glucose.: 98 mg/dL (17 Sep 2023 00:25)  POCT Blood Glucose.: 104 mg/dL (16 Sep 2023 17:20)  POCT Blood Glucose.: 92 mg/dL (16 Sep 2023 11:52)    LIVER FUNCTIONS - ( 16 Sep 2023 05:20 )  Alb: 2.1 g/dL / Pro: 7.9 g/dL / ALK PHOS: 186 U/L / ALT: 41 U/L / AST: 66 U/L / GGT: x             RADIOLOGY & ADDITIONAL STUDIES:

## 2023-09-17 NOTE — PROGRESS NOTE ADULT - ASSESSMENT
78 M w/ Crohn's, AFib/Flutter s/p DCCVs on amiodarone, remote ileocectomy and open appendectomy. Admitted (6/23) for SBO vs Crohns flare, s/p NGT decompression and s/p lap TRE converted to open TRE, SBR x 3, left in discontinuity with abthera vac on (6/27), RTOR for ileocolic resection, small bowel anastomosis, and abdominal wall closure on (6/28), c/b fluid collection s/p IR aspiration of perihepatic fluid on (7/3), c/b wound dehiscence s/p RTOR exlap, washout, ileocolic resection with end ileostomy, blow hole colostomy, red rubber from ileostomy to small bowel anastomosis; vicryl bridging mesh on (7/5) transferred to SICU postoperatively for hemodynamic monitoring, with hospital course complicated by periods of AMS most recently on 9/1 and associated with oliguria, severe ELVI and hyponatremia, which resolved but now stepped back up for to SICU on 9/10 for acute AMS, intermittent hypoglycemia, AFib with RVR. Perc Choley tube placed on 9/11 for enlarged GB.     Plan:   - NPO/IVF/TPN   - Wound vac change twice weekly and PRN for significant leakage. Next change today    - Monitor output from drains; replete PRN for high output    - PRN pain and nausea control   - Hx of A-fib/flutter; in NSR on rate control. Appreciate Cards/EP input   - Appreciate Neuro input   - Appreciate ID input   - DVT ppx   - OOB/PT  - Tentative plan for skin graft in 2 - 3 weeks per Plastics   - restart octreotide  78 M w/ Crohn's, AFib/Flutter s/p DCCVs on amiodarone, remote ileocectomy and open appendectomy. Admitted (6/23) for SBO vs Crohns flare, s/p NGT decompression and s/p lap TRE converted to open TRE, SBR x 3, left in discontinuity with abthera vac on (6/27), RTOR for ileocolic resection, small bowel anastomosis, and abdominal wall closure on (6/28), c/b fluid collection s/p IR aspiration of perihepatic fluid on (7/3), c/b wound dehiscence s/p RTOR exlap, washout, ileocolic resection with end ileostomy, blow hole colostomy, red rubber from ileostomy to small bowel anastomosis; vicryl bridging mesh on (7/5) transferred to SICU postoperatively for hemodynamic monitoring, with hospital course complicated by periods of AMS most recently on 9/1 and associated with oliguria, severe ELVI and hyponatremia, which resolved but now stepped back up for to SICU on 9/10 for acute AMS, intermittent hypoglycemia, AFib with RVR. Perc Choley tube placed on 9/11 for enlarged GB.     Plan:   - NPO with sips and chips //TPN / Replete electrolyte abnormalities and fluid losses  - Wound vac change twice weekly and PRN for significant leakage. Tuesday next change     - Monitor output from drains; replete PRN for high output    - PRN pain and nausea control   - Hx of A-fib/flutter; in NSR on rate control. Appreciate Cards/EP input   - Appreciate Neuro input   - Appreciate ID input   - DVT ppx   - OOB/PT  - Tentative plan for skin graft in 2 - 3 weeks per Plastics - Dr. BRITO  - Dispo: SICU

## 2023-09-18 NOTE — PROGRESS NOTE ADULT - ASSESSMENT
78 M w/ Crohn's, AFib/Flutter s/p DCCVs on amiodarone, remote ileocectomy and open appendectomy. Admitted (6/23) for SBO vs Crohns flare, s/p NGT decompression and s/p lap TRE converted to open TRE, SBR x 3, left in discontinuity with abthera vac on (6/27), RTOR for ileocolic resection, small bowel anastomosis, and abdominal wall closure on (6/28), c/b fluid collection s/p IR aspiration of perihepatic fluid on (7/3), c/b wound dehiscence s/p RTOR exlap, washout, ileocolic resection with end ileostomy, blow hole colostomy, red rubber from ileostomy to small bowel anastomosis; vicryl bridging mesh on (7/5) transferred to SICU postoperatively for hemodynamic monitoring, with hospital course complicated by periods of AMS most recently on 9/1 and associated with oliguria, severe ELVI and hyponatremia, which resolved but now stepped back up for to SICU on 9/10 for acute AMS, intermittent hypoglycemia, AFib with RVR. Perc Choley tube placed on 9/11 for enlarged GB.     Plan:   - NPO with sips and chips //TPN / Replete electrolyte abnormalities and fluid losses  - Wound vac change twice weekly and PRN for significant leakage. Change today for continuous leaking  - Monitor output from drains; replete PRN for high output    - PRN pain and nausea control   - Hx of A-fib/flutter; in NSR on rate control. Appreciate Cards/EP input   - DVT ppx   - DISPO: SICU   - OOB/PT  - Tentative plan for skin graft in 2 - 3 weeks per Plastics - Dr. BRITO  - Dispo: SICU

## 2023-09-18 NOTE — PROGRESS NOTE ADULT - SUBJECTIVE AND OBJECTIVE BOX
SUBJECTIVE:    Resting well in bed this morning, mental status seems to be back at baseline currently. vac noticed holding adequate suction     MEDICATIONS  (STANDING):  chlorhexidine 2% Cloths 1 Application(s) Topical <User Schedule>  cholestyramine Powder (Sugar-Free) 4 Gram(s) Oral two times a day  dextrose 5%. 1000 milliLiter(s) (50 mL/Hr) IV Continuous <Continuous>  dextrose 5%. 1000 milliLiter(s) (100 mL/Hr) IV Continuous <Continuous>  dextrose 50% Injectable 25 Gram(s) IV Push once  dextrose 50% Injectable 12.5 Gram(s) IV Push once  dextrose 50% Injectable 25 Gram(s) IV Push once  glucagon  Injectable 1 milliGRAM(s) IntraMuscular once  heparin   Injectable 5000 Unit(s) SubCutaneous every 8 hours  insulin lispro (ADMELOG) corrective regimen sliding scale   SubCutaneous every 6 hours  levothyroxine 50 MICROGram(s) Oral daily  lipid, fat emulsion (Fish Oil and Plant Based) 20% Infusion 1 Gm/kG/Day (41.67 mL/Hr) IV Continuous <Continuous>  loperamide 4 milliGRAM(s) Oral every 6 hours  melatonin 5 milliGRAM(s) Oral at bedtime  metoprolol tartrate 25 milliGRAM(s) Oral every 12 hours  nystatin Powder 1 Application(s) Topical three times a day  pantoprazole    Tablet 40 milliGRAM(s) Oral before breakfast  Parenteral Nutrition - Adult 1 Each (104 mL/Hr) TPN Continuous <Continuous>  Parenteral Nutrition - Adult 1 Each (104 mL/Hr) TPN Continuous <Continuous>    MEDICATIONS  (PRN):  benzocaine/menthol Lozenge 2 Lozenge Oral every 4 hours PRN Sore Throat  dextrose Oral Gel 15 Gram(s) Oral once PRN Blood Glucose LESS THAN 70 milliGRAM(s)/deciliter  ondansetron Injectable 4 milliGRAM(s) IV Push every 8 hours PRN Nausea and/or Vomiting      Vital Signs Last 24 Hrs  T(C): 37 (18 Sep 2023 11:31), Max: 37.1 (17 Sep 2023 13:39)  T(F): 98.6 (18 Sep 2023 11:31), Max: 98.8 (17 Sep 2023 13:39)  HR: 81 (18 Sep 2023 12:00) (78 - 85)  BP: 156/74 (18 Sep 2023 12:00) (129/60 - 167/75)  BP(mean): 107 (18 Sep 2023 12:00) (84 - 117)  RR: 29 (18 Sep 2023 12:00) (14 - 32)  SpO2: 100% (18 Sep 2023 12:00) (89% - 100%)    Parameters below as of 18 Sep 2023 12:00  Patient On (Oxygen Delivery Method): room air        Physical Exam:  General: NAD, resting comfortably in bed  Pulmonary: Nonlabored breathing, no respiratory distress  Cardiovascular: NSR  Abdominal: soft, NT/ND  Extremities: WWP, normal strength  Neuro: A/O x 3, CNs II-XII grossly intact, no focal deficits    I&O's Summary    17 Sep 2023 07:01  -  18 Sep 2023 07:00  --------------------------------------------------------  IN: 3038 mL / OUT: 3440 mL / NET: -402 mL    18 Sep 2023 07:01  -  18 Sep 2023 12:53  --------------------------------------------------------  IN: 520 mL / OUT: 150 mL / NET: 370 mL        LABS:                        9.1    9.80  )-----------( 276      ( 18 Sep 2023 05:31 )             29.0     09-18    134<L>  |  101  |  40<H>  ----------------------------<  103<H>  4.2   |  25  |  1.17    Ca    8.8      18 Sep 2023 05:31  Phos  3.7     09-18  Mg     2.0     09-18    TPro  8.0  /  Alb  2.0<L>  /  TBili  3.4<H>  /  DBili  2.3<H>  /  AST  71<H>  /  ALT  46<H>  /  AlkPhos  176<H>  09-18      Urinalysis Basic - ( 18 Sep 2023 05:31 )    Color: x / Appearance: x / SG: x / pH: x  Gluc: 103 mg/dL / Ketone: x  / Bili: x / Urobili: x   Blood: x / Protein: x / Nitrite: x   Leuk Esterase: x / RBC: x / WBC x   Sq Epi: x / Non Sq Epi: x / Bacteria: x      CAPILLARY BLOOD GLUCOSE      POCT Blood Glucose.: 106 mg/dL (18 Sep 2023 11:12)  POCT Blood Glucose.: 95 mg/dL (18 Sep 2023 05:29)  POCT Blood Glucose.: 100 mg/dL (17 Sep 2023 23:17)  POCT Blood Glucose.: 85 mg/dL (17 Sep 2023 18:00)    LIVER FUNCTIONS - ( 18 Sep 2023 05:31 )  Alb: 2.0 g/dL / Pro: 8.0 g/dL / ALK PHOS: 176 U/L / ALT: 46 U/L / AST: 71 U/L / GGT: x             RADIOLOGY & ADDITIONAL STUDIES:

## 2023-09-18 NOTE — CHART NOTE - NSCHARTNOTEFT_GEN_A_CORE
Wound care done at bedside today. Dr. Valera (Plastics) present. After assessing wound, taking into consideration leakage and frequent vac changes, multidisciplinary decision was made to switch to wet-to-dry packing (please see precise instructions below)     Wound dimensions: 13 x 8 x 0.3 cm     Dressing instructions:   1. Scrape the wound bed with a tongue depressor to remove the fibrin and stimulate bleeding. This can be done once daily   2: Spread out one wet 4 x 4 gauze over the wound bed (not the fistula), just like you would an adaptic.   3: Dump a ton of dry 4 x 4 gauze (about 10 - 15) over entire wound   4: Place 3 ABD pads   5: Secure with paper tape. Avoid too much tape to minimize skin irritation

## 2023-09-18 NOTE — PROGRESS NOTE ADULT - ATTENDING COMMENTS
Crohn's, AF S/P SBR, ileocecolic resection, Franc's with end ileostomy, multiple episodes of sepsis and metabolic encephalopathy  physical as above  creatinine continues to decrease but continue to hold effexor as C creatinine is still low  follow cystatins when creat is stable  BUN up with high urine urea so will decreae TPN protein tomorrow  follow off abx  rest as above

## 2023-09-18 NOTE — PROGRESS NOTE ADULT - SUBJECTIVE AND OBJECTIVE BOX
SUBJECTIVE:  NAEO. Vac reinforced. This AM is doing well without complaints. No pain or N/V or abd pain. No CP/SOB. Vac is beeping indicating leak.      Vital Signs Last 24 Hrs  T(C): 37 (18 Sep 2023 11:31), Max: 37.1 (18 Sep 2023 06:30)  T(F): 98.6 (18 Sep 2023 11:31), Max: 98.8 (18 Sep 2023 06:30)  HR: 82 (18 Sep 2023 15:00) (78 - 85)  BP: 153/72 (18 Sep 2023 15:00) (129/60 - 167/75)  BP(mean): 104 (18 Sep 2023 15:) (84 - 117)  RR: 22 (18 Sep 2023 15:) (14 - 48)  SpO2: 99% (18 Sep 2023 15:) (89% - 100%)    Parameters below as of 18 Sep 2023 16:00  Patient On (Oxygen Delivery Method): room air    Physical Exam:  GENERAL: NAD, resting comfortably in bed  HEENT: NCAT, MMM  C/V: Normal rate, normal peripheral perfusion, no murmurs  PULM: Nonlabored breathing, no respiratory distress, CTA b/l  ABD: Soft, ND, NT, no rebound tenderness, no guarding, vac in place but beeping, fistula bag in place to low suction with no appreciated leaks, ostomy bag over blow hole with mccauley in place with no appreciated leaks   EXTREM: WWP, no edema, SCDs in place  NEURO: No focal deficits      I&O's Summary    17 Sep 2023 07:  -  18 Sep 2023 07:00  --------------------------------------------------------  IN: 3038 mL / OUT: 3440 mL / NET: -402 mL    18 Sep 2023 07:01  -  18 Sep 2023 15:11  --------------------------------------------------------  IN: 936 mL / OUT: 725 mL / NET: 211 mL      I&O's Detail    17 Sep 2023 07:01  -  18 Sep 2023 07:00  --------------------------------------------------------  IN:    Fat Emulsion (Fish Oil &amp; Plant Based) 20% Infusion: 542 mL    TPN (Total Parenteral Nutrition): 2496 mL  Total IN: 3038 mL    OUT:    Bulb (mL): 15 mL    Drain (mL): 535 mL    Drain (mL): 15 mL    Drain (mL): 50 mL    VAC (Vacuum Assisted Closure) System (mL): 325 mL    Voided (mL): 2500 mL  Total OUT: 3440 mL    Total NET: -402 mL      18 Sep 2023 07:01  -  18 Sep 2023 15:11  --------------------------------------------------------  IN:    TPN (Total Parenteral Nutrition): 936 mL  Total IN: 936 mL    OUT:    Drain (mL): 150 mL    Fat Emulsion (Fish Oil &amp; Plant Based) 20% Infusion: 0 mL    VAC (Vacuum Assisted Closure) System (mL): 50 mL    Voided (mL): 525 mL  Total OUT: 725 mL    Total NET: 211 mL      MEDICATIONS  (STANDING):  chlorhexidine 2% Cloths 1 Application(s) Topical <User Schedule>  cholestyramine Powder (Sugar-Free) 4 Gram(s) Oral two times a day  dextrose 5%. 1000 milliLiter(s) (100 mL/Hr) IV Continuous <Continuous>  dextrose 5%. 1000 milliLiter(s) (50 mL/Hr) IV Continuous <Continuous>  dextrose 50% Injectable 25 Gram(s) IV Push once  dextrose 50% Injectable 12.5 Gram(s) IV Push once  dextrose 50% Injectable 25 Gram(s) IV Push once  glucagon  Injectable 1 milliGRAM(s) IntraMuscular once  heparin   Injectable 5000 Unit(s) SubCutaneous every 8 hours  insulin lispro (ADMELOG) corrective regimen sliding scale   SubCutaneous every 6 hours  levothyroxine 50 MICROGram(s) Oral daily  lipid, fat emulsion (Fish Oil and Plant Based) 20% Infusion 1 Gm/kG/Day (41.67 mL/Hr) IV Continuous <Continuous>  loperamide 4 milliGRAM(s) Oral every 6 hours  melatonin 5 milliGRAM(s) Oral at bedtime  metoprolol tartrate 25 milliGRAM(s) Oral every 12 hours  nystatin Powder 1 Application(s) Topical three times a day  pantoprazole    Tablet 40 milliGRAM(s) Oral before breakfast  Parenteral Nutrition - Adult 1 Each (104 mL/Hr) TPN Continuous <Continuous>  Parenteral Nutrition - Adult 1 Each (104 mL/Hr) TPN Continuous <Continuous>    MEDICATIONS  (PRN):  benzocaine/menthol Lozenge 2 Lozenge Oral every 4 hours PRN Sore Throat  dextrose Oral Gel 15 Gram(s) Oral once PRN Blood Glucose LESS THAN 70 milliGRAM(s)/deciliter  meclizine 25 milliGRAM(s) Oral every 6 hours PRN Nausea  ondansetron Injectable 4 milliGRAM(s) IV Push every 8 hours PRN Nausea and/or Vomiting      LABS:                        9.1    9.80  )-----------( 276      ( 18 Sep 2023 05:31 )             29.0         134<L>  |  101  |  40<H>  ----------------------------<  103<H>  4.2   |  25  |  1.17    Ca    8.8      18 Sep 2023 05:31  Phos  3.7       Mg     2.0         TPro  8.0  /  Alb  2.0<L>  /  TBili  3.4<H>  /  DBili  2.3<H>  /  AST  71<H>  /  ALT  46<H>  /  AlkPhos  176<H>        Urinalysis Basic - ( 18 Sep 2023 10:06 )    Color: Yellow / Appearance: Clear / S.015 / pH: x  Gluc: x / Ketone: NEGATIVE  / Bili: Small / Urobili: 0.2 E.U./dL   Blood: x / Protein: 30 mg/dL / Nitrite: NEGATIVE   Leuk Esterase: NEGATIVE / RBC: < 5 /HPF / WBC < 5 /HPF   Sq Epi: x / Non Sq Epi: x / Bacteria: None /HPF

## 2023-09-18 NOTE — PROGRESS NOTE ADULT - ASSESSMENT
78M w/ Crohn's, AFib/Flutter s/p DCCVs on amiodarone, remote ileocectomy and open appy here for SBO vs Crohns flare, s/p NGT decompression and now s/p lap TRE converted to open TRE, SBR x 3, left in discontinuity with abthera vac on 6/27, RTOR for ileocolic resection, small bowel anastomosis, and abdominal wall closure on 6/28, and s/p IR aspiration of perihepatic fluid on 7/3, stepped down to telemetry on 7/4. wound dehiscence on 7/5, RTOR ex-lap, washout, ileocolic resection with end ileostomy, blow hole colostomy, red rubber from ileostomy to small bowel anastomosis; Vicryl bridging mesh on 7/5. Transferred to SICU post op for HD monitoring. Extubated 7/6. Surgical wound with decreasing in size and granulating with Vac. Tentative plan for skin graft per Plastics; timing TBD pending proper wound shrinkage and granulation   8/23: Upgraded to SICU 8/23 for acute delirium and tremors, r/o sepsis vs metabolic encephalopathy. Ammonia levels normal    8/25: Spiked temp to 101.3 overnight w/ new leukocytosis to 14   8/28: Downgraded from SICU. No growth from blood cultures. Abx x 5 days until 8/29 8/30: Leukocytosis resolved   9/1: Decreased UOP, soft pressures. Cr 3.08 from 1.12. C/f dehydration 2/2 high fistula output vs ELVI   9/2: Upgraded to SICU for worsening ELVI. Stepped down 9/6   9/10: Upgraded to SICU for AMS, hypoglycemia and A-fib.   9/11: In setting of transaminitis, hyperbilirubinemia, leukocytosis and GB distention, underwent IR perc cony     Hemodynamically normal and afebrile. Low fistula output. Skin graft will not be feasible per plastics given frequent leakage from fistula into wound bed.     Plan:   - NPO/IVF/TPN   - Switch wound vac to wet-to-dry packing Q4 vs Q6 (see instructions in chart/event note)   - Monitor perc cony, ostomy and JOYCE drain output  - PRN pain and nausea control   - Hx of A-fib/flutter; in NSR on rate control. Appreciate Cards/EP input   - DVT ppx   - OOB/PT     D/w SICU, chief resident and attending

## 2023-09-18 NOTE — ADVANCED PRACTICE NURSE CONSULT - ASSESSMENT
Pt with unstageable pressure injury to right heel measuring approx 1 x 1 cm, scab covering 100% of wound bed. Pt able to independently change positions in bed, offloading boots not in use.  Pt with unstageable pressure injury to right heel measuring approx 1 x 1 cm, scab covering 100% of wound bed. Pt able to independently change positions in bed, offloading boots not in use. Moisture damage noted in gluteal cleft, not pressure injury.

## 2023-09-18 NOTE — PROGRESS NOTE ADULT - SUBJECTIVE AND OBJECTIVE BOX
SUBJECTIVE:   Patient seen and examined at bedside this AM   No acute overnight events but fistula wound manager and ostomy appliance leaked overnight hence inaccurate I/O's   Patient feels well without no specific complaints and concerns   Voiding spontaneously      MEDICATIONS  (STANDING):  chlorhexidine 2% Cloths 1 Application(s) Topical <User Schedule>  cholestyramine Powder (Sugar-Free) 4 Gram(s) Oral two times a day  dextrose 5%. 1000 milliLiter(s) (50 mL/Hr) IV Continuous <Continuous>  dextrose 5%. 1000 milliLiter(s) (100 mL/Hr) IV Continuous <Continuous>  dextrose 50% Injectable 25 Gram(s) IV Push once  dextrose 50% Injectable 12.5 Gram(s) IV Push once  dextrose 50% Injectable 25 Gram(s) IV Push once  glucagon  Injectable 1 milliGRAM(s) IntraMuscular once  heparin   Injectable 5000 Unit(s) SubCutaneous every 8 hours  insulin lispro (ADMELOG) corrective regimen sliding scale   SubCutaneous every 6 hours  levothyroxine 50 MICROGram(s) Oral daily  lipid, fat emulsion (Fish Oil and Plant Based) 20% Infusion 1 Gm/kG/Day (41.67 mL/Hr) IV Continuous <Continuous>  loperamide 4 milliGRAM(s) Oral every 6 hours  melatonin 5 milliGRAM(s) Oral at bedtime  metoprolol tartrate 25 milliGRAM(s) Oral every 12 hours  nystatin Powder 1 Application(s) Topical three times a day  pantoprazole    Tablet 40 milliGRAM(s) Oral before breakfast  Parenteral Nutrition - Adult 1 Each (104 mL/Hr) TPN Continuous <Continuous>  Parenteral Nutrition - Adult 1 Each (104 mL/Hr) TPN Continuous <Continuous>    MEDICATIONS  (PRN):  benzocaine/menthol Lozenge 2 Lozenge Oral every 4 hours PRN Sore Throat  dextrose Oral Gel 15 Gram(s) Oral once PRN Blood Glucose LESS THAN 70 milliGRAM(s)/deciliter  meclizine 25 milliGRAM(s) Oral every 6 hours PRN Nausea  ondansetron Injectable 4 milliGRAM(s) IV Push every 8 hours PRN Nausea and/or Vomiting      Vital Signs Last 24 Hrs  T(C): 37 (18 Sep 2023 11:31), Max: 37.1 (18 Sep 2023 06:30)  T(F): 98.6 (18 Sep 2023 11:31), Max: 98.8 (18 Sep 2023 06:30)  HR: 81 (18 Sep 2023 13:00) (78 - 85)  BP: 144/67 (18 Sep 2023 13:00) (129/60 - 167/75)  BP(mean): 97 (18 Sep 2023 13:00) (84 - 117)  RR: 16 (18 Sep 2023 13:00) (14 - 29)  SpO2: 100% (18 Sep 2023 12:00) (89% - 100%)    Parameters below as of 18 Sep 2023 13:00  Patient On (Oxygen Delivery Method): room air        Physical Exam:  General: NAD, resting comfortably in bed  C/V: NSR  Pulm: Nonlabored breathing, no respiratory distress  Abd: soft, wound vac in place with adequate seal and suction, Poole drain to LIS in fistula with surrounding wound manager to gravity, JOYCE drain in left abdomen, ostomy pink & patent, perc cony drain with bilious output   : Adequate UOP  Extrem: WWP, no edema, SCDs in place   Neuro: A&Ox4; no focal deficits     I&O's Summary    17 Sep 2023 07:01  -  18 Sep 2023 07:00  --------------------------------------------------------  IN: 3038 mL / OUT: 3440 mL / NET: -402 mL    18 Sep 2023 07:01  -  18 Sep 2023 14:04  --------------------------------------------------------  IN: 624 mL / OUT: 550 mL / NET: 74 mL        LABS:                        9.1    9.80  )-----------( 276      ( 18 Sep 2023 05:31 )             29.0     09-18    134<L>  |  101  |  40<H>  ----------------------------<  103<H>  4.2   |  25  |  1.17    Ca    8.8      18 Sep 2023 05:31  Phos  3.7       Mg     2.0         TPro  8.0  /  Alb  2.0<L>  /  TBili  3.4<H>  /  DBili  2.3<H>  /  AST  71<H>  /  ALT  46<H>  /  AlkPhos  176<H>        Urinalysis Basic - ( 18 Sep 2023 10:06 )    Color: Yellow / Appearance: Clear / S.015 / pH: x  Gluc: x / Ketone: NEGATIVE  / Bili: Small / Urobili: 0.2 E.U./dL   Blood: x / Protein: 30 mg/dL / Nitrite: NEGATIVE   Leuk Esterase: NEGATIVE / RBC: < 5 /HPF / WBC < 5 /HPF   Sq Epi: x / Non Sq Epi: x / Bacteria: None /HPF      CAPILLARY BLOOD GLUCOSE      POCT Blood Glucose.: 106 mg/dL (18 Sep 2023 11:12)  POCT Blood Glucose.: 95 mg/dL (18 Sep 2023 05:29)  POCT Blood Glucose.: 100 mg/dL (17 Sep 2023 23:17)  POCT Blood Glucose.: 85 mg/dL (17 Sep 2023 18:00)    LIVER FUNCTIONS - ( 18 Sep 2023 05:31 )  Alb: 2.0 g/dL / Pro: 8.0 g/dL / ALK PHOS: 176 U/L / ALT: 46 U/L / AST: 71 U/L / GGT: x             RADIOLOGY & ADDITIONAL STUDIES:

## 2023-09-18 NOTE — PROGRESS NOTE ADULT - ASSESSMENT
78 M w/ Crohn's, AFib/Flutter s/p DCCVs on amiodarone, remote ileocectomy and open appendectomy. Admitted (6/23) for SBO vs Crohns flare, s/p NGT decompression and s/p lap TRE converted to open TRE, SBR x 3, left in discontinuity with abthera vac on (6/27), RTOR for ileocolic resection, small bowel anastomosis, and abdominal wall closure on (6/28), c/b fluid collection s/p IR aspiration of perihepatic fluid on (7/3), c/b wound dehiscence s/p RTOR exlap, washout, ileocolic resection with end ileostomy, blow hole colostomy, red rubber from ileostomy to small bowel anastomosis; vicryl bridging mesh on (7/5) transferred to SICU postoperatively for hemodynamic monitoring, with hospital course complicated by periods of AMS most recently on 9/1 and associated with oliguria, severe ELVI and hyponatremia, which resolved but now stepped back up for to SICU on 9/10 for acute AMS, intermittent hypoglycemia, AFib with RVR. Perc Choley tube placed on 9/11 for enlarged GB. AMS now resolved.    NEURO: AMS/agitation resolved. EEG neg. Hx of depression - effexor Dc'd on 9/10. Pain control with Dilaudid for vac changes only.   HENT: Cepacol  CV: Clinically appears euvolemic. Episode of Afib with RVR, resolved with 5mg IVP Metoprolol, Hx Afib/flutter: s/p DCCV, Off amiodarone given transaminitis and elevated T bili; Continue Metoprolol PO. New ST Depressions on EKG with AFib and Trop 56 - elevated trops due to Afib with RVR, ; Hx HTN - Holding Norvasc, Losartan. Hx HLD: hold Lipitor given LFTs. TTE  (7/18) - PASP 64mmHg, EF 65-70%,   PULM: Saturating well on RA.   GI/FEN: NPO with sips of gatorade; Imodium/Lomotril and Octreotide in TPN for high ostomy and ECF output. PICC/TPN; transaminitis of unknown origin, distended gallbladder on CT 8/25, RUQ US negative. Distended GB s/p Perc Choley. PPI. Meclizine PRN for nausea.  : Voids, previous severe ELVI with BUN increase 35 (25) Cr now downtrendingl. Nephrology recommendations appreciated. UA for increasing BUN, 1L NS bolus after UA.   ENDO: . Hx hypothyroid: IV Synthroid, TSH 2.180 (9.170).   ID: Numerous SICU admissions from sepsis, most recently with concern for line sepsis. Now WBC dowtrended. Currently off abx. Imipenem (9/10-9/15) // CTAP (8/25) without fluid collections, distended GB, atelectasis. Imipenem (8/26--). Previously had C. tertium, Lactobacillis from his IR cx 7/3, and candida albicans, lactobacillus, vanc sensitive E faecium, vanc resistant E gallinarum, vanc resistant E casseliflavus, lactobacillus from his OR cx 7/5. Completed course of abx with imipenem (6/30-7/12, 7/23-7/30), Dapto (6/30-7/5 and 7/23-7/24). been off all abx since 7/30. empiric dapto (8/23-24) and cefepime (8/23-24).   PPX: SCDs, SQH   LINES: PIVs, LUE PICC (9/1 - )  WOUNDS/DRAINS: ECF abdominal wound with vac change Mon/Fri--next on 9/14. PT: Home health PT  DISPO:

## 2023-09-18 NOTE — PROGRESS NOTE ADULT - SUBJECTIVE AND OBJECTIVE BOX
78 M w/ Crohn's, AFib/Flutter s/p DCCVs on amiodarone, remote ileocectomy and open appendectomy. Admitted (6/23) for SBO vs Crohns flare, s/p NGT decompression and s/p lap TRE converted to open TRE, SBR x 3, left in discontinuity with abthera vac on (6/27), RTOR for ileocolic resection, small bowel anastomosis, and abdominal wall closure on (6/28), c/b fluid collection s/p IR aspiration of perihepatic fluid on (7/3), c/b wound dehiscence s/p RTOR exlap, washout, ileocolic resection with end ileostomy, blow hole colostomy, red rubber from ileostomy to small bowel anastomosis; vicryl bridging mesh on (7/5) transferred to SICU postoperatively for hemodynamic monitoring, with hospital course complicated by periods of AMS most recently on 9/1 and associated with oliguria, severe ELVI and hyponatremia, which resolved but now stepped back up for to SICU on 9/10 for acute AMS, intermittent hypoglycemia, AFib with RVR. Started on precedex for agitation overnight. CT abd 9/10 showing distended gallbladder with sludge. SP IR percutaneous cholecystostomy placement 9/11/23.    Perc cony: 150cc dark bilious outpt/24 hr, day prior 410cc. Dressing cdi. Flushed easily with 3cc NS. Continue to monitor. IR will continue to follow.

## 2023-09-19 NOTE — PROGRESS NOTE ADULT - SUBJECTIVE AND OBJECTIVE BOX
SUBJECTIVE:  NAEO. Vac reinforced. This AM is doing well without complaints. No pain or N/V or abd pain. No CP/SOB. Vac is beeping indicating leak.        OBJECTIVE:  Vital Signs Last 24 Hrs  T(C): 36.4 (19 Sep 2023 09:24), Max: 37.1 (18 Sep 2023 21:44)  T(F): 97.5 (19 Sep 2023 09:24), Max: 98.7 (18 Sep 2023 21:44)  HR: 83 (19 Sep 2023 11:00) (79 - 85)  BP: 122/66 (19 Sep 2023 11:00) (110/78 - 165/74)  BP(mean): 89 (19 Sep 2023 11:00) (81 - 130)  RR: 34 (19 Sep 2023 11:00) (14 - 48)  SpO2: 100% (19 Sep 2023 11:00) (71% - 100%)    Parameters below as of 19 Sep 2023 11:00  Patient On (Oxygen Delivery Method): room air      Physical Exam:  GENERAL: NAD, resting comfortably in bed  HEENT: NCAT, MMM  C/V: Normal rate, normal peripheral perfusion, no murmurs  PULM: Nonlabored breathing, no respiratory distress, CTA b/l  ABD: Soft, ND, NT, no rebound tenderness, no guarding, vac in place but beeping, fistula bag in place to low suction with no appreciated leaks, ostomy bag over blow hole with mccauley in place with no appreciated leaks   EXTREM: WWP, no edema, SCDs in place  NEURO: No focal deficits     I&O's Summary    18 Sep 2023 07:01  -  19 Sep 2023 07:00  --------------------------------------------------------  IN: 3079.8 mL / OUT: 2145 mL / NET: 934.8 mL    19 Sep 2023 07:01  -  19 Sep 2023 12:18  --------------------------------------------------------  IN: 496 mL / OUT: 380 mL / NET: 116 mL      I&O's Detail    18 Sep 2023 07:01  -  19 Sep 2023 07:00  --------------------------------------------------------  IN:    Fat Emulsion (Fish Oil &amp; Plant Based) 20% Infusion: 583.8 mL    TPN (Total Parenteral Nutrition): 2496 mL  Total IN: 3079.8 mL    OUT:    Bulb (mL): 25 mL    Drain (mL): 35 mL    Drain (mL): 535 mL    Fat Emulsion (Fish Oil &amp; Plant Based) 20% Infusion: 0 mL    VAC (Vacuum Assisted Closure) System (mL): 50 mL    Voided (mL): 1500 mL  Total OUT: 2145 mL    Total NET: 934.8 mL      19 Sep 2023 07:01  -  19 Sep 2023 12:18  --------------------------------------------------------  IN:    Oral Fluid: 80 mL    TPN (Total Parenteral Nutrition): 416 mL  Total IN: 496 mL    OUT:    Drain (mL): 80 mL    Fat Emulsion (Fish Oil &amp; Plant Based) 20% Infusion: 0 mL    Voided (mL): 300 mL  Total OUT: 380 mL    Total NET: 116 mL      MEDICATIONS  (STANDING):  chlorhexidine 2% Cloths 1 Application(s) Topical <User Schedule>  cholestyramine Powder (Sugar-Free) 4 Gram(s) Oral two times a day  dextrose 5%. 1000 milliLiter(s) (50 mL/Hr) IV Continuous <Continuous>  dextrose 5%. 1000 milliLiter(s) (100 mL/Hr) IV Continuous <Continuous>  dextrose 50% Injectable 25 Gram(s) IV Push once  dextrose 50% Injectable 12.5 Gram(s) IV Push once  dextrose 50% Injectable 25 Gram(s) IV Push once  glucagon  Injectable 1 milliGRAM(s) IntraMuscular once  heparin   Injectable 5000 Unit(s) SubCutaneous every 8 hours  insulin lispro (ADMELOG) corrective regimen sliding scale   SubCutaneous every 6 hours  levothyroxine 50 MICROGram(s) Oral daily  lipid, fat emulsion (Fish Oil and Plant Based) 20% Infusion 1 Gm/kG/Day (41.67 mL/Hr) IV Continuous <Continuous>  loperamide 4 milliGRAM(s) Oral every 6 hours  melatonin 5 milliGRAM(s) Oral at bedtime  metoprolol tartrate 25 milliGRAM(s) Oral every 12 hours  nystatin Powder 1 Application(s) Topical three times a day  pantoprazole    Tablet 40 milliGRAM(s) Oral before breakfast  Parenteral Nutrition - Adult 1 Each (104 mL/Hr) TPN Continuous <Continuous>  Parenteral Nutrition - Adult 1 Each (83 mL/Hr) TPN Continuous <Continuous>    MEDICATIONS  (PRN):  benzocaine/menthol Lozenge 2 Lozenge Oral every 4 hours PRN Sore Throat  dextrose Oral Gel 15 Gram(s) Oral once PRN Blood Glucose LESS THAN 70 milliGRAM(s)/deciliter  meclizine 25 milliGRAM(s) Oral every 6 hours PRN Nausea  ondansetron Injectable 4 milliGRAM(s) IV Push every 8 hours PRN Nausea and/or Vomiting      LABS:                        9.0    9.26  )-----------( 276      ( 19 Sep 2023 05:35 )             28.3     09-19    136  |  102  |  41<H>  ----------------------------<  104<H>  4.0   |  26  |  1.21    Ca    8.7      19 Sep 2023 05:35  Phos  3.7     09-19  Mg     2.0     09-19    TPro  8.0  /  Alb  2.0<L>  /  TBili  3.4<H>  /  DBili  2.3<H>  /  AST  71<H>  /  ALT  46<H>  /  AlkPhos  176<H>  09-18      Urinalysis Basic - ( 19 Sep 2023 05:35 )    Color: x / Appearance: x / SG: x / pH: x  Gluc: 104 mg/dL / Ketone: x  / Bili: x / Urobili: x   Blood: x / Protein: x / Nitrite: x   Leuk Esterase: x / RBC: x / WBC x   Sq Epi: x / Non Sq Epi: x / Bacteria: x

## 2023-09-19 NOTE — PROGRESS NOTE ADULT - ASSESSMENT
78M w/ Crohn's, AFib/Flutter s/p DCCVs on amiodarone, remote ileocectomy and open appy here for SBO vs Crohns flare, s/p NGT decompression and now s/p lap TRE converted to open TRE, SBR x 3, left in discontinuity with abthera vac on 6/27, RTOR for ileocolic resection, small bowel anastomosis, and abdominal wall closure on 6/28, and s/p IR aspiration of perihepatic fluid on 7/3, stepped down to telemetry on 7/4. wound dehiscence on 7/5, RTOR ex-lap, washout, ileocolic resection with end ileostomy, blow hole colostomy, red rubber from ileostomy to small bowel anastomosis; Vicryl bridging mesh on 7/5. Transferred to SICU post op for HD monitoring. Extubated 7/6. Surgical wound with decreasing in size and granulating with Vac. Tentative plan for skin graft per Plastics; timing TBD pending proper wound shrinkage and granulation   8/23: Upgraded to SICU 8/23 for acute delirium and tremors, r/o sepsis vs metabolic encephalopathy. Ammonia levels normal    8/25: Spiked temp to 101.3 overnight w/ new leukocytosis to 14   8/28: Downgraded from SICU. No growth from blood cultures. Abx x 5 days until 8/29 8/30: Leukocytosis resolved   9/1: Decreased UOP, soft pressures. Cr 3.08 from 1.12. C/f dehydration 2/2 high fistula output vs ELVI   9/2: Upgraded to SICU for worsening ELVI. Stepped down 9/6   9/10: Upgraded to SICU for AMS, hypoglycemia and A-fib.   9/11: In setting of transaminitis, hyperbilirubinemia, leukocytosis and GB distention, underwent IR perc cony     Hemodynamically normal and afebrile. Low fistula output. Skin graft will not be feasible per plastics given frequent leakage from fistula into wound bed.     Plan:   - NPO/IVF/TPN; advance to clears  - Wet-to-dry packing Q4 vs Q6 (see instructions in chart/event note on 9/18)   - Monitor perc cony, ostomy and JOYCE drain output  - PRN pain and nausea control   - Hx of A-fib/flutter; in NSR on rate control. Appreciate Cards/EP input   - DVT ppx   - OOB/PT     D/w SICU, chief resident and attending  78M w/ Crohn's, AFib/Flutter s/p DCCVs on amiodarone, remote ileocectomy and open appy here for SBO vs Crohns flare, s/p NGT decompression and now s/p lap TRE converted to open TRE, SBR x 3, left in discontinuity with abthera vac on 6/27, RTOR for ileocolic resection, small bowel anastomosis, and abdominal wall closure on 6/28, and s/p IR aspiration of perihepatic fluid on 7/3, stepped down to telemetry on 7/4. wound dehiscence on 7/5, RTOR ex-lap, washout, ileocolic resection with end ileostomy, blow hole colostomy, red rubber from ileostomy to small bowel anastomosis; Vicryl bridging mesh on 7/5. Transferred to SICU post op for HD monitoring. Extubated 7/6. Surgical wound with decreasing in size and granulating with Vac. Tentative plan for skin graft per Plastics; timing TBD pending proper wound shrinkage and granulation   8/23: Upgraded to SICU 8/23 for acute delirium and tremors, r/o sepsis vs metabolic encephalopathy. Ammonia levels normal    8/25: Spiked temp to 101.3 overnight w/ new leukocytosis to 14   8/28: Downgraded from SICU. No growth from blood cultures. Abx x 5 days until 8/29 8/30: Leukocytosis resolved   9/1: Decreased UOP, soft pressures. Cr 3.08 from 1.12. C/f dehydration 2/2 high fistula output vs ELVI   9/2: Upgraded to SICU for worsening ELVI. Stepped down 9/6   9/10: Upgraded to SICU for AMS, hypoglycemia and A-fib.   9/11: In setting of transaminitis, hyperbilirubinemia, leukocytosis and GB distention, underwent IR perc cony     Hemodynamically normal and afebrile. Low fistula output. Skin graft will not be feasible per plastics given frequent leakage from fistula into wound bed.     Plan:   - NPO/IVF/TPN  - Continue wet-to-dry packing Q4 vs Q6 (see instructions in chart/event note on 9/18)   - Monitor perc cony, ostomy and JOYCE drain output  - PRN pain and nausea control   - Hx of A-fib/flutter; continue Lopressor   - DVT chemo & mechanical ppx   - OOB/PT     D/w SICU, chief resident and attending

## 2023-09-19 NOTE — PROGRESS NOTE ADULT - ASSESSMENT
78M w/ Crohn's, AFib/Flutter s/p DCCVs on amiodarone, remote ileocectomy and open appy here for SBO vs Crohns flare, s/p NGT decompression and now s/p lap TRE converted to open TRE, SBR x 3, left in discontinuity with abthera vac on 6/27, RTOR for ileocolic resection, small bowel anastomosis, and abdominal wall closure on 6/28, and s/p IR aspiration of perihepatic fluid on 7/3, stepped down to telemetry on 7/4. wound dehiscence on 7/5, RTOR ex-lap, washout, ileocolic resection with end ileostomy, blow hole colostomy, red rubber from ileostomy to small bowel anastomosis; Vicryl bridging mesh on 7/5. Transferred to SICU post op for HD monitoring. Extubated 7/6. Surgical wound with decreasing in size and granulating with Vac. Tentative plan for skin graft per Plastics; timing TBD pending proper wound shrinkage and granulation   8/23: Upgraded to SICU 8/23 for acute delirium and tremors, r/o sepsis vs metabolic encephalopathy. Ammonia levels normal    8/25: Spiked temp to 101.3 overnight w/ new leukocytosis to 14   8/28: Downgraded from SICU. No growth from blood cultures. Abx x 5 days until 8/29 8/30: Leukocytosis resolved   9/1: Decreased UOP, soft pressures. Cr 3.08 from 1.12. C/f dehydration 2/2 high fistula output vs ELVI   9/2: Upgraded to SICU for worsening ELVI. Stepped down 9/6   9/10: Upgraded to SICU for AMS, hypoglycemia and A-fib.   9/11: In setting of transaminitis, hyperbilirubinemia, leukocytosis and GB distention, underwent IR perc cony     Hemodynamically normal and afebrile. Low fistula output. Skin graft will not be feasible per plastics given frequent leakage from fistula into wound bed.     Plan:   - NPO/IVF/TPN  - Continue wet-to-dry packing Q4 vs Q6 (see instructions in chart/event note on 9/18)   - Monitor perc cony, ostomy and JOYCE drain output  - PRN pain and nausea control   - Hx of A-fib/flutter; continue Lopressor   - DVT chemo & mechanical ppx   - OOB/PT     D/w SICU, chief resident and attending

## 2023-09-19 NOTE — CHART NOTE - NSCHARTNOTEFT_GEN_A_CORE
Admitting Diagnosis:   Patient is a 78y old  Male who presents with a chief complaint of Crohn's disease (18 Sep 2023 15:11)      PAST MEDICAL & SURGICAL HISTORY:  Essential hypertension  Hypertension      Atrial fibrillation  s/p cardioversion 2010 and 2014  Pt. reports 4 DCCV  Now on Amiodarone 200mg PO bid and Eliquis 5mg PO bid  Last DCCV 4 yrs ago at [facility]      Crohn's disease  s/p partial resection of ileum      Hyperlipidemia      Hypothyroidism      History of depression  On Venlafaxine ER 150mg PO bid      Junctional rhythm      H/O knee surgery      History of cataract surgery          Current Nutrition Order: NPO w/ sips & chips; TPN via PICC- 340g Dex, 172g AA, 100g SMOF lipids [2844 kcals, 172g protein, GIR 2.72 mg/kg/min]    PO Intake: Good (%) [   ]  Fair (50-75%) [   ] Poor (<25%) [   ]- N/A    GI Issues: soft, nontender/nondistended. wet to dry dressing over wound. ostomy with minimal output [25cc], perc cony drain with green/brown output, JOYCE in LUQ with no output    Pain: no pain/discomfort noted    Skin Integrity:  R heel DTI, LUQ JOYCE, mid abd wound, EC fistula, ileostomy, L forearm skin tear    Labs:   09-19    136  |  102  |  41<H>  ----------------------------<  104<H>  4.0   |  26  |  1.21    Ca    8.7      19 Sep 2023 05:35  Phos  3.7     09-19  Mg     2.0     09-19    TPro  8.0  /  Alb  2.0<L>  /  TBili  3.4<H>  /  DBili  2.3<H>  /  AST  71<H>  /  ALT  46<H>  /  AlkPhos  176<H>  09-18    CAPILLARY BLOOD GLUCOSE      POCT Blood Glucose.: 95 mg/dL (19 Sep 2023 11:36)  POCT Blood Glucose.: 94 mg/dL (19 Sep 2023 05:23)  POCT Blood Glucose.: 99 mg/dL (18 Sep 2023 23:15)  POCT Blood Glucose.: 97 mg/dL (18 Sep 2023 17:18)      Medications:  MEDICATIONS  (STANDING):  chlorhexidine 2% Cloths 1 Application(s) Topical <User Schedule>  cholestyramine Powder (Sugar-Free) 4 Gram(s) Oral two times a day  dextrose 5%. 1000 milliLiter(s) (50 mL/Hr) IV Continuous <Continuous>  dextrose 5%. 1000 milliLiter(s) (100 mL/Hr) IV Continuous <Continuous>  dextrose 50% Injectable 25 Gram(s) IV Push once  dextrose 50% Injectable 12.5 Gram(s) IV Push once  dextrose 50% Injectable 25 Gram(s) IV Push once  glucagon  Injectable 1 milliGRAM(s) IntraMuscular once  heparin   Injectable 5000 Unit(s) SubCutaneous every 8 hours  insulin lispro (ADMELOG) corrective regimen sliding scale   SubCutaneous every 6 hours  levothyroxine 50 MICROGram(s) Oral daily  lipid, fat emulsion (Fish Oil and Plant Based) 20% Infusion 1 Gm/kG/Day (41.67 mL/Hr) IV Continuous <Continuous>  loperamide 4 milliGRAM(s) Oral every 6 hours  melatonin 5 milliGRAM(s) Oral at bedtime  metoprolol tartrate 25 milliGRAM(s) Oral every 12 hours  nystatin Powder 1 Application(s) Topical three times a day  pantoprazole    Tablet 40 milliGRAM(s) Oral before breakfast  Parenteral Nutrition - Adult 1 Each (104 mL/Hr) TPN Continuous <Continuous>  Parenteral Nutrition - Adult 1 Each (83 mL/Hr) TPN Continuous <Continuous>    MEDICATIONS  (PRN):  benzocaine/menthol Lozenge 2 Lozenge Oral every 4 hours PRN Sore Throat  dextrose Oral Gel 15 Gram(s) Oral once PRN Blood Glucose LESS THAN 70 milliGRAM(s)/deciliter  meclizine 25 milliGRAM(s) Oral every 6 hours PRN Nausea  ondansetron Injectable 4 milliGRAM(s) IV Push every 8 hours PRN Nausea and/or Vomiting      Weight: 86.7kg [18 Sep 2023]  Daily 88.1kg [16 Sep 2023]  Daily 88.9kg [15 Sep 2023]   Daily 89.1 [14 Sep 2023]  Daily 92.9kg [6 Sep 2023]  Daily 91.8kg [27 Aug 2023]  Daily 101kg [9 Aug 2023]  Daily   102.1kg [10 July 2023]  Daily 99.9kg [9 July 2023]    Weight Change: weight trending down throughout admission. Recommend continue weekly weights for trending / ensuring adequacy of nutrition provision    IBW: 86.4kg   % IBW: 100.3%    Estimated energy needs:   Ideal body weight (86.4kg) used for calculations as pt >100% of IBW, BMI <30 per [facility] Standards of Care. Needs estimated for age and adjusted for current clinical status, increased needs for post-op & open abd wound healing    Calories: 5414-7689 kcals based on 30-35 kcals/kg  Protein: 172.8-216 g protein based on 2.0-2.5g protein/kg  *Fluid needs per team    Subjective:   78M w/ Crohn's, AFib/Flutter s/p DCCVs on amiodarone, remote ileocectomy and open appy here for SBO vs Crohns flare, s/p NGT decompression and now s/p lap TRE converted to open TRE, SBR x 3, left in discontinuity with abthera vac on 6/27, RTOR for ileocolic resection, small bowel anastomosis, and abdominal wall closure on 6/28, and s/p IR aspiration of perihepatic fluid on 7/3, stepped down to telemetry on 7/4. wound dehiscence on 7/5, RTOR ex-lap, washout, ileocolic resection with end ileostomy, blow hole colostomy, red rubber from ileostomy to small bowel anastomosis; Vicryl bridging mesh on 7/5. Transferred to SICU post op for HD monitoring. Extubated 7/6 and SDU 8/2. Has been recovering on telemetry with ECF management and prolonged TPN. Upgraded to SICU 8/23 for acute delirium and tremors, r/o sepsis vs metabolic encephalopathy. Ammonia levels normal. TPN DC'ed and started on PO diet. Stepped down to 8 [place] on 8/28.     Chart reviewed. Pt seen at bedside on 5 EA with IDT today during AM rounds, on room air. Rx & labs reviewed. Afebrile. HR WNL, BP WNL to low. MAPs >65 mmHg. TPN remains primary means to nutrition, current provision provides 32.9 kcals/kg, 24.9 non-protein kcals/kg, 2.0g protein/kg. Low fistula output noted, ok for sips of clears/juice per team. BUN 41 today, trending up with high urine urea- plan to decrease protein in TPN today.  [9/18], total bilirubin 3.4 <H>, direct bilirubin 2.3 <H>, ALT & AST elevated today [no copper, manganese in TPN]. Meds: on meclizine, imodium, zofran prn, cholestyramine. TPN reordered for tonight. RDN will continue to monitor, reassess, and intervene as appropriate.       Previous Nutrition Diagnosis:  Increased Nutrient Needs R/T physiological demands for nutrient AEB post-op wound healing    Active [ X ]  Resolved [   ]    If resolved, new PES:     Goal:  Pt will meet at least 75% of protein & energy needs via most appropriate route for nutrition     Recommendations:  1. TPN via PICC-  Recommend 365g Dex, 125g AA, 100g SMOF lipids; provides 2741 kcals, 125g protein, GIR 2. mg/kg/min; provides 31.7 kcals/kg, 1.45g protein/kg, 25.9 non-protein kcals/kg IBW  - c/w MVI, B1, Vit C, Zinc in TPN bag, hold copper & manganese  - monitor resp status & renal labs, adjust provision prn  - sips of juice & gatorade ok per team- closely monitor s/s GI distress & outputs  2. Fluids & lytes per team  3. Weekly lipid panel  - hold lipids if TG >400  4. Daily BMP, Mg, Phos. POC BG 4-6hrs  - monitor & replenish lytes outside TPN bag  5. Monitor potential for resuming PO diet  6. Pain & bowel regimen per team    Education: deferred    Risk Level: High [ X  ] Moderate [   ] Low [   ]

## 2023-09-19 NOTE — PROGRESS NOTE ADULT - SUBJECTIVE AND OBJECTIVE BOX
SUBJECTIVE:   Patient seen and examined at bedside this AM   Wound vac switched to wet-to-dry dressing yesterday, currently being changed Q6hrs   Patient expressed much more comfort with wet-to-dry dressing   Has no complaints or concerns   Voiding spontaneously and adequately       MEDICATIONS  (STANDING):  chlorhexidine 2% Cloths 1 Application(s) Topical <User Schedule>  cholestyramine Powder (Sugar-Free) 4 Gram(s) Oral two times a day  dextrose 5%. 1000 milliLiter(s) (50 mL/Hr) IV Continuous <Continuous>  dextrose 5%. 1000 milliLiter(s) (100 mL/Hr) IV Continuous <Continuous>  dextrose 50% Injectable 12.5 Gram(s) IV Push once  dextrose 50% Injectable 25 Gram(s) IV Push once  dextrose 50% Injectable 25 Gram(s) IV Push once  glucagon  Injectable 1 milliGRAM(s) IntraMuscular once  heparin   Injectable 5000 Unit(s) SubCutaneous every 8 hours  insulin lispro (ADMELOG) corrective regimen sliding scale   SubCutaneous every 6 hours  levothyroxine 50 MICROGram(s) Oral daily  lipid, fat emulsion (Fish Oil and Plant Based) 20% Infusion 1 Gm/kG/Day (41.67 mL/Hr) IV Continuous <Continuous>  loperamide 4 milliGRAM(s) Oral every 6 hours  melatonin 5 milliGRAM(s) Oral at bedtime  metoprolol tartrate 25 milliGRAM(s) Oral every 12 hours  nystatin Powder 1 Application(s) Topical three times a day  pantoprazole    Tablet 40 milliGRAM(s) Oral before breakfast  Parenteral Nutrition - Adult 1 Each (104 mL/Hr) TPN Continuous <Continuous>    MEDICATIONS  (PRN):  benzocaine/menthol Lozenge 2 Lozenge Oral every 4 hours PRN Sore Throat  dextrose Oral Gel 15 Gram(s) Oral once PRN Blood Glucose LESS THAN 70 milliGRAM(s)/deciliter  meclizine 25 milliGRAM(s) Oral every 6 hours PRN Nausea  ondansetron Injectable 4 milliGRAM(s) IV Push every 8 hours PRN Nausea and/or Vomiting      Vital Signs Last 24 Hrs  T(C): 36.9 (19 Sep 2023 01:06), Max: 37.1 (18 Sep 2023 21:44)  T(F): 98.4 (19 Sep 2023 01:06), Max: 98.7 (18 Sep 2023 21:44)  HR: 84 (19 Sep 2023 06:00) (79 - 85)  BP: 123/58 (19 Sep 2023 06:00) (110/78 - 165/74)  BP(mean): 83 (19 Sep 2023 06:00) (81 - 130)  RR: 31 (19 Sep 2023 06:00) (14 - 48)  SpO2: 96% (19 Sep 2023 06:00) (71% - 100%)    Parameters below as of 19 Sep 2023 06:00  Patient On (Oxygen Delivery Method): room air        Physical Exam:  GENERAL: NAD, resting comfortably in bed  HEENT: NCAT, MMM  C/V: Normal rate, normal peripheral perfusion, no murmurs  PULM: Nonlabored breathing, no respiratory distress in room aur  ABD: Soft, ND, NT, no rebound tenderness, no guarding. Wet-to-dry dressing over wound clean with minimal stool staining. Wound bed clean-appearing. Ileostomy with minimal output (25 cc), perc cony with bilious output (130/410 cc). JOYCE in LUQ with no output   : Voiding spontaneously and adequately   EXTREM: WWP, no edema, SCDs in place  NEURO: No focal deficits      I&O's Summary    17 Sep 2023 07:01  -  18 Sep 2023 07:00  --------------------------------------------------------  IN: 3038 mL / OUT: 3440 mL / NET: -402 mL    18 Sep 2023 07:01  -  19 Sep 2023 06:41  --------------------------------------------------------  IN: 2975.8 mL / OUT: 2145 mL / NET: 830.8 mL        LABS:                        9.0    9.26  )-----------( 276      ( 19 Sep 2023 05:35 )             28.3     09-19    136  |  102  |  41<H>  ----------------------------<  104<H>  4.0   |  26  |  1.21    Ca    8.7      19 Sep 2023 05:35  Phos  3.7     09-19  Mg     2.0     09-19    TPro  8.0  /  Alb  2.0<L>  /  TBili  3.4<H>  /  DBili  2.3<H>  /  AST  71<H>  /  ALT  46<H>  /  AlkPhos  176<H>  09-18      Urinalysis Basic - ( 19 Sep 2023 05:35 )    Color: x / Appearance: x / SG: x / pH: x  Gluc: 104 mg/dL / Ketone: x  / Bili: x / Urobili: x   Blood: x / Protein: x / Nitrite: x   Leuk Esterase: x / RBC: x / WBC x   Sq Epi: x / Non Sq Epi: x / Bacteria: x      CAPILLARY BLOOD GLUCOSE      POCT Blood Glucose.: 94 mg/dL (19 Sep 2023 05:23)  POCT Blood Glucose.: 99 mg/dL (18 Sep 2023 23:15)  POCT Blood Glucose.: 97 mg/dL (18 Sep 2023 17:18)  POCT Blood Glucose.: 106 mg/dL (18 Sep 2023 11:12)    LIVER FUNCTIONS - ( 18 Sep 2023 05:31 )  Alb: 2.0 g/dL / Pro: 8.0 g/dL / ALK PHOS: 176 U/L / ALT: 46 U/L / AST: 71 U/L / GGT: x             RADIOLOGY & ADDITIONAL STUDIES:

## 2023-09-19 NOTE — PROGRESS NOTE ADULT - SUBJECTIVE AND OBJECTIVE BOX
78 M w/ Crohn's, AFib/Flutter s/p DCCVs on amiodarone, remote ileocectomy and open appendectomy. Admitted (6/23) for SBO vs Crohns flare, s/p NGT decompression and s/p lap TRE converted to open TRE, SBR x 3, left in discontinuity with abthera vac on (6/27), RTOR for ileocolic resection, small bowel anastomosis, and abdominal wall closure on (6/28), c/b fluid collection s/p IR aspiration of perihepatic fluid on (7/3), c/b wound dehiscence s/p RTOR exlap, washout, ileocolic resection with end ileostomy, blow hole colostomy, red rubber from ileostomy to small bowel anastomosis; vicryl bridging mesh on (7/5) transferred to SICU postoperatively for hemodynamic monitoring, with hospital course complicated by periods of AMS most recently on 9/1 and associated with oliguria, severe ELVI and hyponatremia, which resolved but now stepped back up for to SICU on 9/10 for acute AMS, intermittent hypoglycemia, AFib with RVR. Started on precedex for agitation overnight. CT abd 9/10 showing distended gallbladder with sludge. SP IR percutaneous cholecystostomy placement 9/11/23.    Perc cony: 535cc bilious outpt/24 hr, day prior 535cc. Dressing cdi. Flushed easily with 3cc NS. Continue to monitor. IR will continue to follow.

## 2023-09-19 NOTE — PROGRESS NOTE ADULT - ATTENDING COMMENTS
Crohn's, AF, SBR, ileocolic resection, enteroatmospheric fistula  physical as above  some retching today  CT with po contrast  continue TPN  follow off abx  rest as above

## 2023-09-19 NOTE — PROGRESS NOTE ADULT - SUBJECTIVE AND OBJECTIVE BOX
SUBJECTIVE:     Patient seen and examined at bedside this AM   Wound vac switched to wet-to-dry dressing yesterday, currently being changed Q6hrs, last change 6am  Patient expressed much more comfort with wet-to-dry dressing   Has no complaints or concerns   Voiding spontaneously and adequately   PERc Cony: 350cc overnight     MEDICATIONS  (STANDING):  chlorhexidine 2% Cloths 1 Application(s) Topical <User Schedule>  cholestyramine Powder (Sugar-Free) 4 Gram(s) Oral two times a day  dextrose 5%. 1000 milliLiter(s) (50 mL/Hr) IV Continuous <Continuous>  dextrose 5%. 1000 milliLiter(s) (100 mL/Hr) IV Continuous <Continuous>  dextrose 50% Injectable 25 Gram(s) IV Push once  dextrose 50% Injectable 12.5 Gram(s) IV Push once  dextrose 50% Injectable 25 Gram(s) IV Push once  glucagon  Injectable 1 milliGRAM(s) IntraMuscular once  heparin   Injectable 5000 Unit(s) SubCutaneous every 8 hours  insulin lispro (ADMELOG) corrective regimen sliding scale   SubCutaneous every 6 hours  levothyroxine 50 MICROGram(s) Oral daily  lipid, fat emulsion (Fish Oil and Plant Based) 20% Infusion 1 Gm/kG/Day (41.67 mL/Hr) IV Continuous <Continuous>  loperamide 4 milliGRAM(s) Oral every 6 hours  melatonin 5 milliGRAM(s) Oral at bedtime  metoprolol tartrate 25 milliGRAM(s) Oral every 12 hours  nystatin Powder 1 Application(s) Topical three times a day  pantoprazole    Tablet 40 milliGRAM(s) Oral before breakfast  Parenteral Nutrition - Adult 1 Each (104 mL/Hr) TPN Continuous <Continuous>    MEDICATIONS  (PRN):  benzocaine/menthol Lozenge 2 Lozenge Oral every 4 hours PRN Sore Throat  dextrose Oral Gel 15 Gram(s) Oral once PRN Blood Glucose LESS THAN 70 milliGRAM(s)/deciliter  meclizine 25 milliGRAM(s) Oral every 6 hours PRN Nausea  ondansetron Injectable 4 milliGRAM(s) IV Push every 8 hours PRN Nausea and/or Vomiting      Vital Signs Last 24 Hrs  T(C): 36.8 (19 Sep 2023 05:08), Max: 37.1 (18 Sep 2023 21:44)  T(F): 98.2 (19 Sep 2023 05:08), Max: 98.7 (18 Sep 2023 21:44)  HR: 83 (19 Sep 2023 08:00) (79 - 85)  BP: 115/59 (19 Sep 2023 08:00) (110/78 - 165/74)  BP(mean): 81 (19 Sep 2023 08:00) (81 - 130)  RR: 25 (19 Sep 2023 08:00) (14 - 48)  SpO2: 100% (19 Sep 2023 08:00) (71% - 100%)    Parameters below as of 19 Sep 2023 08:00  Patient On (Oxygen Delivery Method): room air      Physical Exam:  GENERAL: NAD, resting comfortably in bed  HEENT: NCAT, MMM  C/V: Normal rate, normal peripheral perfusion, no murmurs  PULM: Nonlabored breathing, no respiratory distress in room aur  ABD: Soft, ND, NT, no rebound tenderness, no guarding. Wet-to-dry dressing over wound clean with minimal stool staining. Wound bed clean-appearing. Ileostomy with minimal output (25 cc), perc cony with bilious output (130/410 cc). JOYCE in LUQ with no output   : Voiding spontaneously and adequately   EXTREM: WWP, no edema, SCDs in place  NEURO: No focal deficits      I&O's Summary    18 Sep 2023 07:01  -  19 Sep 2023 07:00  --------------------------------------------------------  IN: 3079.8 mL / OUT: 2145 mL / NET: 934.8 mL    19 Sep 2023 07:01  -  19 Sep 2023 08:16  --------------------------------------------------------  IN: 104 mL / OUT: 0 mL / NET: 104 mL        LABS:                        9.0    9.26  )-----------( 276      ( 19 Sep 2023 05:35 )             28.3     09-19    136  |  102  |  41<H>  ----------------------------<  104<H>  4.0   |  26  |  1.21    Ca    8.7      19 Sep 2023 05:35  Phos  3.7     09-19  Mg     2.0     09-19    TPro  8.0  /  Alb  2.0<L>  /  TBili  3.4<H>  /  DBili  2.3<H>  /  AST  71<H>  /  ALT  46<H>  /  AlkPhos  176<H>  09-18      Urinalysis Basic - ( 19 Sep 2023 05:35 )    Color: x / Appearance: x / SG: x / pH: x  Gluc: 104 mg/dL / Ketone: x  / Bili: x / Urobili: x   Blood: x / Protein: x / Nitrite: x   Leuk Esterase: x / RBC: x / WBC x   Sq Epi: x / Non Sq Epi: x / Bacteria: x      CAPILLARY BLOOD GLUCOSE      POCT Blood Glucose.: 94 mg/dL (19 Sep 2023 05:23)  POCT Blood Glucose.: 99 mg/dL (18 Sep 2023 23:15)  POCT Blood Glucose.: 97 mg/dL (18 Sep 2023 17:18)  POCT Blood Glucose.: 106 mg/dL (18 Sep 2023 11:12)    LIVER FUNCTIONS - ( 18 Sep 2023 05:31 )  Alb: 2.0 g/dL / Pro: 8.0 g/dL / ALK PHOS: 176 U/L / ALT: 46 U/L / AST: 71 U/L / GGT: x             RADIOLOGY & ADDITIONAL STUDIES:

## 2023-09-20 NOTE — PROGRESS NOTE ADULT - SUBJECTIVE AND OBJECTIVE BOX
Interval Events:  CT scan without progression of contrast passed the Stomach. Repeat Abd Xray this am again shows Contrast still remains in stomach.   Patient seen and examined at bedside.      Allergies    penicillin (Angioedema)    Intolerances        Vital Signs Last 24 Hrs  T(C): 36.9 (20 Sep 2023 01:17), Max: 37.5 (19 Sep 2023 21:59)  T(F): 98.4 (20 Sep 2023 01:17), Max: 99.5 (19 Sep 2023 21:59)  HR: 84 (20 Sep 2023 06:00) (78 - 85)  BP: 126/60 (20 Sep 2023 06:00) (112/59 - 164/73)  BP(mean): 85 (20 Sep 2023 06:00) (81 - 107)  RR: 23 (20 Sep 2023 06:00) (16 - 33)  SpO2: 94% (20 Sep 2023 06:00) (93% - 100%)    Parameters below as of 20 Sep 2023 06:00  Patient On (Oxygen Delivery Method): room air        09-19 @ 07:01  -  09-20 @ 07:00  --------------------------------------------------------  IN: 3283.8 mL / OUT: 2295 mL / NET: 988.8 mL      09-19 @ 07:01  -  09-20 @ 07:00  --------------------------------------------------------  IN: 3283.8 mL / OUT: 2295 mL / NET: 988.8 mL        Physical Exam:     GENERAL: NAD, resting comfortably in bed  NEURO: No focal deficits  HEENT: NCAT, MMM  C/V: Normal rate, normal peripheral perfusion, no murmurs  PULM: Nonlabored breathing, no respiratory distress, CTA b/l  ABD: Soft, ND, NT, no rebound tenderness, no guarding,, ostomy bag over blow hole with mccauley in place with no appreciated leaks   EXTREM: WWP, no edema, SCDs in place  Skin: No rashes noted  Psych: No signs of anxiety or depression       LABS:      CBC Full  -  ( 19 Sep 2023 05:35 )  WBC Count : 9.26 K/uL  RBC Count : 3.10 M/uL  Hemoglobin : 9.0 g/dL  Hematocrit : 28.3 %  Platelet Count - Automated : 276 K/uL  Mean Cell Volume : 91.3 fl  Mean Cell Hemoglobin : 29.0 pg  Mean Cell Hemoglobin Concentration : 31.8 gm/dL  Auto Neutrophil # : x  Auto Lymphocyte # : x  Auto Monocyte # : x  Auto Eosinophil # : x  Auto Basophil # : x  Auto Neutrophil % : x  Auto Lymphocyte % : x  Auto Monocyte % : x  Auto Eosinophil % : x  Auto Basophil % : x    09-19    136  |  102  |  41<H>  ----------------------------<  104<H>  4.0   |  26  |  1.21    Ca    8.7      19 Sep 2023 05:35  Phos  3.7     09-19  Mg     2.0     09-19            Urinalysis Basic - ( 19 Sep 2023 05:35 )    Color: x / Appearance: x / SG: x / pH: x  Gluc: 104 mg/dL / Ketone: x  / Bili: x / Urobili: x   Blood: x / Protein: x / Nitrite: x   Leuk Esterase: x / RBC: x / WBC x   Sq Epi: x / Non Sq Epi: x / Bacteria: x              RADIOLOGY & ADDITIONAL STUDIES (The following images were personally reviewed):          A/p:78 M w/ Crohn's, AFib/Flutter s/p DCCVs on amiodarone, remote ileocectomy and open appendectomy. Admitted (6/23) for SBO vs Crohns flare, s/p NGT decompression and s/p lap TRE converted to open TRE, SBR x 3, left in discontinuity with abthera vac on (6/27), RTOR for ileocolic resection, small bowel anastomosis, and abdominal wall closure on (6/28), c/b fluid collection s/p IR aspiration of perihepatic fluid on (7/3), c/b wound dehiscence s/p RTOR exlap, washout, ileocolic resection with end ileostomy, blow hole colostomy, red rubber from ileostomy to small bowel anastomosis; vicryl bridging mesh on (7/5) transferred to SICU postoperatively for hemodynamic monitoring, with hospital course complicated by periods of AMS most recently on 9/1 and associated with oliguria, severe ELVI and hyponatremia, which resolved but now stepped back up for to SICU on 9/10 for acute AMS, intermittent hypoglycemia, AFib with RVR. Perc Choley tube placed on 9/11 for enlarged GB.     NEURO: AMS Resolved.  EEG neg, follow Neurology recs. Hx of depression - effexor Dc'd on 9/10. Pain control with Dilaudid for vac changes only. Cont Melatonin prn Insomnia, Cont Zofran   HENT: Cepacol  CV: Clinically appears euvolemic. Episode of Afib with RVR, resolved with 5mg IVP Metoprolol, Hx Afib/flutter: s/p DCCV, Off amiodarone given transaminitis; Continue Metoprolol 5q6 while AMS. Hx HTN - Holding Norvasc, Losartan. Hx HLD: hold Lipitor given LFTs. TTE  (7/18) - PASP 64mmHg, EF 65-70%,   PULM: Saturating well on RA.   GI/FEN: CT scan showing Contrast not progressing past pyloris: Cont NPO, Will discuss with Gen sx reg dc of Imodium/Lomotil.Cont Cholestyramine packets Cont Octreotide in TPN for high ostomy and ECF output. PICC/TPN; Lipids @100G, Dex@27/kg, AA @1.6   transaminitis of unknown origin, Now stable s/p Perc Choley on 9/11. PPI.   : Voids, previous severe ELVI with BUN increasing however Cr now downtrending, Ulytes and UA normal. Nephrology Signed off   ENDO: mISS. Hx hypothyroid: IV Synthroid, TSH 2.180 (9.170), Recurrent Hypoglycemia on TPN  ID: Numerous SICU admissions for sepsis. Currently off abx. Nystatin powder  //Dc'd: Previously had C. tertium, Lactobacillis from his IR cx 7/3, and candida albicans, lactobacillus, vanc sensitive E faecium, vanc resistant E gallinarum, vanc resistant E casseliflavus, lactobacillus from his OR cx 7/5. Completed course of abx with Imipenem (9/10-9/15). Imipenem (8/26--) imipenem (6/30-7/12, 7/23-7/30), Dapto (6/30-7/5 and 7/23-7/24).  empiric dapto (8/23-24) and cefepime (8/23-24).   PPX: SCDs, SQH   LINES: PIVs, LUE PICC (9/1 - )  WOUNDS/DRAINS: ECF abdominal wound with vac change Mon/Fri--next on 9/14. PT: Home health PT  DISPO: Possible Step down this week.  Interval Events:  CT scan without progression of contrast passed the Stomach. Repeat Abd Xray this am again shows Contrast still remains in stomach.   Patient seen and examined at bedside.      Allergies    penicillin (Angioedema)    Intolerances        Vital Signs Last 24 Hrs  T(C): 36.9 (20 Sep 2023 01:17), Max: 37.5 (19 Sep 2023 21:59)  T(F): 98.4 (20 Sep 2023 01:17), Max: 99.5 (19 Sep 2023 21:59)  HR: 84 (20 Sep 2023 06:00) (78 - 85)  BP: 126/60 (20 Sep 2023 06:00) (112/59 - 164/73)  BP(mean): 85 (20 Sep 2023 06:00) (81 - 107)  RR: 23 (20 Sep 2023 06:00) (16 - 33)  SpO2: 94% (20 Sep 2023 06:00) (93% - 100%)    Parameters below as of 20 Sep 2023 06:00  Patient On (Oxygen Delivery Method): room air        09-19 @ 07:01  -  09-20 @ 07:00  --------------------------------------------------------  IN: 3283.8 mL / OUT: 2295 mL / NET: 988.8 mL      09-19 @ 07:01  -  09-20 @ 07:00  --------------------------------------------------------  IN: 3283.8 mL / OUT: 2295 mL / NET: 988.8 mL        Physical Exam:     GENERAL: NAD, resting comfortably in bed  NEURO: No focal deficits  HEENT: NCAT, MMM  C/V: Normal rate, normal peripheral perfusion, no murmurs  PULM: Nonlabored breathing, no respiratory distress, CTA b/l  ABD: Soft, ND, NT, no rebound tenderness, no guarding,, ostomy bag over blow hole with mccauley in place draining clear fluid. JOYCE draining minimal ss fluid.   EXTREM: WWP, no edema, SCDs in place  MSK: No jt swelling noted.  Skin: No rashes noted  Psych: No signs of anxiety or depression       LABS:      CBC Full  -  ( 19 Sep 2023 05:35 )  WBC Count : 9.26 K/uL  RBC Count : 3.10 M/uL  Hemoglobin : 9.0 g/dL  Hematocrit : 28.3 %  Platelet Count - Automated : 276 K/uL  Mean Cell Volume : 91.3 fl  Mean Cell Hemoglobin : 29.0 pg  Mean Cell Hemoglobin Concentration : 31.8 gm/dL  Auto Neutrophil # : x  Auto Lymphocyte # : x  Auto Monocyte # : x  Auto Eosinophil # : x  Auto Basophil # : x  Auto Neutrophil % : x  Auto Lymphocyte % : x  Auto Monocyte % : x  Auto Eosinophil % : x  Auto Basophil % : x    09-19    136  |  102  |  41<H>  ----------------------------<  104<H>  4.0   |  26  |  1.21    Ca    8.7      19 Sep 2023 05:35  Phos  3.7     09-19  Mg     2.0     09-19            Urinalysis Basic - ( 19 Sep 2023 05:35 )    Color: x / Appearance: x / SG: x / pH: x  Gluc: 104 mg/dL / Ketone: x  / Bili: x / Urobili: x   Blood: x / Protein: x / Nitrite: x   Leuk Esterase: x / RBC: x / WBC x   Sq Epi: x / Non Sq Epi: x / Bacteria: x              RADIOLOGY & ADDITIONAL STUDIES (The following images were personally reviewed):          A/p:78 M w/ Crohn's, AFib/Flutter s/p DCCVs on amiodarone, remote ileocectomy and open appendectomy. Admitted (6/23) for SBO vs Crohns flare, s/p NGT decompression and s/p lap TRE converted to open TRE, SBR x 3, left in discontinuity with abthera vac on (6/27), RTOR for ileocolic resection, small bowel anastomosis, and abdominal wall closure on (6/28), c/b fluid collection s/p IR aspiration of perihepatic fluid on (7/3), c/b wound dehiscence s/p RTOR exlap, washout, ileocolic resection with end ileostomy, blow hole colostomy, red rubber from ileostomy to small bowel anastomosis; vicryl bridging mesh on (7/5) transferred to SICU postoperatively for hemodynamic monitoring, with hospital course complicated by periods of AMS most recently on 9/1 and associated with oliguria, severe ELVI and hyponatremia, which resolved but now stepped back up for to SICU on 9/10 for acute AMS, intermittent hypoglycemia, AFib with RVR. Perc Choley tube placed on 9/11 for enlarged GB.     NEURO: AMS Resolved.  EEG neg, follow Neurology recs. Hx of depression - effexor Dc'd on 9/10. Pain control with Dilaudid for vac changes only. Cont Melatonin prn Insomnia, Cont Zofran   HENT: Cepacol  CV: Clinically appears dry will give 1L NS this am. Hx Afib/flutter: s/p DCCV, Off amiodarone given transaminitis; Continue Metoprolol 5q6. Hx HTN - Holding Norvasc, Losartan. Hx HLD: hold Lipitor given LFTs. TTE  (7/18) - PASP 64mmHg, EF 65-70%,   PULM: Saturating well on RA.   GI/FEN: CT scan showing Contrast not progressing past pyloris: Cont NPO, Will discuss with Gen sx reg dc of Imodium/Lomotil. Cont Cholestyramine packets Cont Octreotide in TPN for high ostomy and ECF output. PICC/TPN; Lipids @100G, Dex@27/kg, AA @1.6   transaminitis of unknown origin, Now stable s/p Perc Choley on 9/11. PPI.   : Voids, previous severe ELVI Increase BUN and Cr slightly today will bolus this am. Nephrology Signed off   ENDO: mISS. Hx hypothyroid: IV Synthroid, TSH 2.180 (9.170), Recurrent Hypoglycemia on TPN- now resolved  ID: Numerous SICU admissions for sepsis. Currently off abx. Cont Nystatin powder  //Dc'd: Previously had C. tertium, Lactobacillis from his IR cx 7/3, and candida albicans, lactobacillus, vanc sensitive E faecium, vanc resistant E gallinarum, vanc resistant E casseliflavus, lactobacillus from his OR cx 7/5. Completed course of abx with Imipenem (9/10-9/15). Imipenem (8/26--) imipenem (6/30-7/12, 7/23-7/30), Dapto (6/30-7/5 and 7/23-7/24).  empiric dapto (8/23-24) and cefepime (8/23-24).   PPX: SCDs, SQH   LINES: PIVs, LUE PICC (9/1 - )  WOUNDS/DRAINS: ECF abdominal wound with vac change Mon/Fri--next on 9/14. PT: Home health PT  DISPO: Possible Step down this week.

## 2023-09-20 NOTE — PROGRESS NOTE ADULT - ATTENDING COMMENTS
Crohn's, AF, SBR, ileocolic resection, end ileostomy,   physical as above  trial of erythromycin for gastric emptying  continue TPN  extra liter of IVF today for insensible losses  follow off antibiotics  renal stable  wet to drys

## 2023-09-20 NOTE — PROGRESS NOTE ADULT - ASSESSMENT
78M w/ Crohn's, AFib/Flutter s/p DCCVs on amiodarone, remote ileocectomy and open appy here for SBO vs Crohns flare, s/p NGT decompression and now s/p lap TER converted to open TRE, SBR x 3, left in discontinuity with abthera vac on 6/27, RTOR for ileocolic resection, small bowel anastomosis, and abdominal wall closure on 6/28, and s/p IR aspiration of perihepatic fluid on 7/3, stepped down to telemetry on 7/4. wound dehiscence on 7/5, RTOR ex-lap, washout, ileocolic resection with end ileostomy, blow hole colostomy, red rubber from ileostomy to small bowel anastomosis; Vicryl bridging mesh on 7/5. Transferred to SICU post op for HD monitoring. Extubated 7/6. Surgical wound with decreasing in size and granulating with Vac. Tentative plan for skin graft per Plastics; timing TBD pending proper wound shrinkage and granulation   8/23: Upgraded to SICU 8/23 for acute delirium and tremors, r/o sepsis vs metabolic encephalopathy. Ammonia levels normal    8/25: Spiked temp to 101.3 overnight w/ new leukocytosis to 14   8/28: Downgraded from SICU. No growth from blood cultures. Abx x 5 days until 8/29 8/30: Leukocytosis resolved   9/1: Decreased UOP, soft pressures. Cr 3.08 from 1.12. C/f dehydration 2/2 high fistula output vs ELVI   9/2: Upgraded to SICU for worsening ELVI. Stepped down 9/6   9/10: Upgraded to SICU for AMS, hypoglycemia and A-fib.   9/11: In setting of transaminitis, hyperbilirubinemia, leukocytosis and GB distention, underwent IR perc cony     Hemodynamically normal and afebrile. Low fistula output. Skin graft will not be feasible per plastics given frequent leakage from fistula into wound bed.   Slight drop in urine output with simultaneous increase in fistula and JOYCE drain output since restarting clears    Plan:   - Sips of clears   - TPN  - Continue wet-to-dry packing Q4 vs Q6 (see instructions in chart/event note on 9/18)   - Monitor perc cony, ostomy and JOYCE drain output  - PRN pain and nausea control   - Hx of A-fib/flutter; continue Lopressor   - DVT chemo & mechanical ppx   - OOB/PT     D/w SICU, chief resident and attending  78M w/ Crohn's, AFib/Flutter s/p DCCVs on amiodarone, remote ileocectomy and open appy here for SBO vs Crohns flare, s/p NGT decompression and now s/p lap TRE converted to open TRE, SBR x 3, left in discontinuity with abthera vac on 6/27, RTOR for ileocolic resection, small bowel anastomosis, and abdominal wall closure on 6/28, and s/p IR aspiration of perihepatic fluid on 7/3, stepped down to telemetry on 7/4. wound dehiscence on 7/5, RTOR ex-lap, washout, ileocolic resection with end ileostomy, blow hole colostomy, red rubber from ileostomy to small bowel anastomosis; Vicryl bridging mesh on 7/5. Transferred to SICU post op for HD monitoring. Extubated 7/6. Surgical wound with decreasing in size and granulating with Vac. Tentative plan for skin graft per Plastics; timing TBD pending proper wound shrinkage and granulation   8/23: Upgraded to SICU 8/23 for acute delirium and tremors, r/o sepsis vs metabolic encephalopathy. Ammonia levels normal    8/25: Spiked temp to 101.3 overnight w/ new leukocytosis to 14   8/28: Downgraded from SICU. No growth from blood cultures. Abx x 5 days until 8/29 8/30: Leukocytosis resolved   9/1: Decreased UOP, soft pressures. Cr 3.08 from 1.12. C/f dehydration 2/2 high fistula output vs ELVI   9/2: Upgraded to SICU for worsening ELVI. Stepped down 9/6   9/10: Upgraded to SICU for AMS, hypoglycemia and A-fib.   9/11: In setting of transaminitis, hyperbilirubinemia, leukocytosis and GB distention, underwent IR perc cony     Hemodynamically normal and afebrile. Low fistula output. Skin graft will not be feasible per plastics given frequent leakage from fistula into wound bed.   Slight drop in urine output with simultaneous increase in fistula and JOYCE drain output since restarting clears   Nausea/emesis; CT A/P overnight pending read    Plan:   - Sips of clears   - TPN  - Continue wet-to-dry packing Q4 vs Q6 (see instructions in chart/event note on 9/18)   - Monitor perc cony, ostomy and JOYCE drain output  - PRN pain and nausea control   - Hx of A-fib/flutter; continue Lopressor   - DVT chemo & mechanical ppx   - OOB/PT     D/w SICU, chief resident and attending  78M w/ Crohn's, AFib/Flutter s/p DCCVs on amiodarone, remote ileocectomy and open appy here for SBO vs Crohns flare, s/p NGT decompression and now s/p lap TRE converted to open TRE, SBR x 3, left in discontinuity with abthera vac on 6/27, RTOR for ileocolic resection, small bowel anastomosis, and abdominal wall closure on 6/28, and s/p IR aspiration of perihepatic fluid on 7/3, stepped down to telemetry on 7/4. wound dehiscence on 7/5, RTOR ex-lap, washout, ileocolic resection with end ileostomy, blow hole colostomy, red rubber from ileostomy to small bowel anastomosis; Vicryl bridging mesh on 7/5. Transferred to SICU post op for HD monitoring. Extubated 7/6. Surgical wound with decreasing in size and granulating with Vac. Tentative plan for skin graft per Plastics; timing TBD pending proper wound shrinkage and granulation   8/23: Upgraded to SICU 8/23 for acute delirium and tremors, r/o sepsis vs metabolic encephalopathy. Ammonia levels normal    8/25: Spiked temp to 101.3 overnight w/ new leukocytosis to 14   8/28: Downgraded from SICU. No growth from blood cultures. Abx x 5 days until 8/29 8/30: Leukocytosis resolved   9/1: Decreased UOP, soft pressures. Cr 3.08 from 1.12. C/f dehydration 2/2 high fistula output vs ELVI   9/2: Upgraded to SICU for worsening ELVI. Stepped down 9/6   9/10: Upgraded to SICU for AMS, hypoglycemia and A-fib.   9/11: In setting of transaminitis, hyperbilirubinemia, leukocytosis and GB distention, underwent IR perc cony     Hemodynamically normal and afebrile. Low fistula output. Skin graft will not be feasible per plastics given frequent leakage from fistula into wound bed.   Slight drop in urine output with simultaneous increase in fistula and JOYCE drain output since restarting clears. Output from JOYCE and RUQ ileostomy clear    Nausea/emesis; CT A/P overnight pending read    Plan:   - Sips of clears   - TPN  - Continue wet-to-dry packing Q4 vs Q6 (see instructions in chart/event note on 9/18)   - Monitor perc cony, ostomy and JOYCE drain output  - PRN pain and nausea control   - Hx of A-fib/flutter; continue Lopressor   - DVT chemo & mechanical ppx   - OOB/PT     D/w SICU, chief resident and attending

## 2023-09-20 NOTE — PROGRESS NOTE ADULT - SUBJECTIVE AND OBJECTIVE BOX
INTERVAL: Pt seen and examined at bedside this am by surgery team. Patient is lying comfortably in bed with no complaints. Yesterday was advanced to some sips, but had episode of emesis after. JOYCE with new serous drainage. CT done with no acute findings.   Patient feels well this morning.     MEDICATIONS  (STANDING):  chlorhexidine 2% Cloths 1 Application(s) Topical <User Schedule>  dextrose 5%. 1000 milliLiter(s) (100 mL/Hr) IV Continuous <Continuous>  dextrose 5%. 1000 milliLiter(s) (50 mL/Hr) IV Continuous <Continuous>  dextrose 50% Injectable 25 Gram(s) IV Push once  dextrose 50% Injectable 12.5 Gram(s) IV Push once  dextrose 50% Injectable 25 Gram(s) IV Push once  glucagon  Injectable 1 milliGRAM(s) IntraMuscular once  heparin   Injectable 5000 Unit(s) SubCutaneous every 8 hours  insulin lispro (ADMELOG) corrective regimen sliding scale   SubCutaneous every 6 hours  levothyroxine Injectable 40 MICROGram(s) IV Push at bedtime  lipid, fat emulsion (Fish Oil and Plant Based) 20% Infusion 1 Gm/kG/Day (41.67 mL/Hr) IV Continuous <Continuous>  melatonin 5 milliGRAM(s) Oral at bedtime  metoprolol tartrate Injectable 5 milliGRAM(s) IV Push every 6 hours  nystatin Powder 1 Application(s) Topical three times a day  Parenteral Nutrition - Adult 1 Each (83 mL/Hr) TPN Continuous <Continuous>  Parenteral Nutrition - Adult 1 Each (83 mL/Hr) TPN Continuous <Continuous>    MEDICATIONS  (PRN):  benzocaine/menthol Lozenge 2 Lozenge Oral every 4 hours PRN Sore Throat  dextrose Oral Gel 15 Gram(s) Oral once PRN Blood Glucose LESS THAN 70 milliGRAM(s)/deciliter  ondansetron Injectable 4 milliGRAM(s) IV Push every 8 hours PRN Nausea and/or Vomiting      Vital Signs Last 24 Hrs  T(C): 37.3 (20 Sep 2023 18:04), Max: 37.5 (19 Sep 2023 21:59)  T(F): 99.1 (20 Sep 2023 18:04), Max: 99.5 (19 Sep 2023 21:59)  HR: 83 (20 Sep 2023 16:00) (72 - 85)  BP: 115/55 (20 Sep 2023 16:00) (115/55 - 183/81)  BP(mean): 79 (20 Sep 2023 16:00) (79 - 117)  RR: 24 (20 Sep 2023 16:00) (11 - 33)  SpO2: 95% (20 Sep 2023 16:00) (93% - 99%)    Parameters below as of 20 Sep 2023 17:00  Patient On (Oxygen Delivery Method): room air        Physical Exam  General: Patient is doing well and lying in bed comfortably  Constitutional: alert and oriented   Pulm: Nonlabored breathing, no respiratory distress  CV: Regular rate and rhythm, normal sinus rhythm  Abd:  soft, nontender, nondistended. Midline wound with wet to dry packing. No purulence from wound. ECF on left lateral aspect.   Extremities: warm, well perfused, no edema  Drains: JOYCE x 1 serous, Ileostomy x 1, ppp    I&O's Detail    19 Sep 2023 07:01  -  20 Sep 2023 07:00  --------------------------------------------------------  IN:    Fat Emulsion (Fish Oil &amp; Plant Based) 20% Infusion: 583.8 mL    Oral Fluid: 435 mL    TPN (Total Parenteral Nutrition): 2265 mL  Total IN: 3283.8 mL    OUT:    Bulb (mL): 165 mL    Drain (mL): 595 mL    Drain (mL): 135 mL    Fat Emulsion (Fish Oil &amp; Plant Based) 20% Infusion: 0 mL    Voided (mL): 1650 mL  Total OUT: 2545 mL    Total NET: 738.8 mL      20 Sep 2023 07:01  -  20 Sep 2023 18:27  --------------------------------------------------------  IN:    Sodium Chloride 0.9% Bolus: 1000 mL    TPN (Total Parenteral Nutrition): 747 mL  Total IN: 1747 mL    OUT:    Drain (mL): 180 mL    Drain (mL): 50 mL    Fat Emulsion (Fish Oil &amp; Plant Based) 20% Infusion: 0 mL    Voided (mL): 500 mL  Total OUT: 730 mL    Total NET: 1017 mL        LABS:                        9.7    8.93  )-----------( 289      ( 20 Sep 2023 06:27 )             31.1     09-20    133<L>  |  99  |  40<H>  ----------------------------<  114<H>  4.2   |  23  |  1.34<H>    Ca    9.0      20 Sep 2023 06:27  Phos  4.3     09-20  Mg     2.2     09-20    TPro  8.9<H>  /  Alb  2.4<L>  /  TBili  3.8<H>  /  DBili  2.6<H>  /  AST  87<H>  /  ALT  63<H>  /  AlkPhos  196<H>  09-20      Urinalysis Basic - ( 20 Sep 2023 06:27 )    Color: x / Appearance: x / SG: x / pH: x  Gluc: 114 mg/dL / Ketone: x  / Bili: x / Urobili: x   Blood: x / Protein: x / Nitrite: x   Leuk Esterase: x / RBC: x / WBC x   Sq Epi: x / Non Sq Epi: x / Bacteria: x

## 2023-09-20 NOTE — PROGRESS NOTE ADULT - SUBJECTIVE AND OBJECTIVE BOX
78 M w/ Crohn's, AFib/Flutter s/p DCCVs on amiodarone, remote ileocectomy and open appendectomy. Admitted (6/23) for SBO vs Crohns flare, s/p NGT decompression and s/p lap TRE converted to open TRE, SBR x 3, left in discontinuity with abthera vac on (6/27), RTOR for ileocolic resection, small bowel anastomosis, and abdominal wall closure on (6/28), c/b fluid collection s/p IR aspiration of perihepatic fluid on (7/3), c/b wound dehiscence s/p RTOR exlap, washout, ileocolic resection with end ileostomy, blow hole colostomy, red rubber from ileostomy to small bowel anastomosis; vicryl bridging mesh on (7/5) transferred to SICU postoperatively for hemodynamic monitoring, with hospital course complicated by periods of AMS most recently on 9/1 and associated with oliguria, severe ELVI and hyponatremia, which resolved but now stepped back up for to SICU on 9/10 for acute AMS, intermittent hypoglycemia, AFib with RVR. Started on precedex for agitation overnight. CT abd 9/10 showing distended gallbladder with sludge. SP IR percutaneous cholecystostomy placement 9/11/23.    Perc cony: 595cc bilious outpt/24 hr, day prior 535cc. Dressing cdi. Flushed easily with 3cc NS. Continue to monitor. IR will continue to follow.

## 2023-09-20 NOTE — PROGRESS NOTE ADULT - ASSESSMENT
78 M w/ Crohn's, AFib/Flutter s/p DCCVs on amiodarone, remote ileocectomy and open appendectomy. Admitted (6/23) for SBO vs Crohns flare, s/p NGT decompression and s/p lap TRE converted to open TRE, SBR x 3, left in discontinuity with abthera vac on (6/27), RTOR for ileocolic resection, small bowel anastomosis, and abdominal wall closure on (6/28), c/b fluid collection s/p IR aspiration of perihepatic fluid on (7/3), c/b wound dehiscence s/p RTOR exlap, washout, ileocolic resection with end ileostomy, blow hole colostomy, red rubber from ileostomy to small bowel anastomosis; vicryl bridging mesh on (7/5) transferred to SICU postoperatively for hemodynamic monitoring, with hospital course complicated by periods of AMS most recently on 9/1 and associated with oliguria, severe ELVI and hyponatremia, which resolved but now stepped back up for to SICU on 9/10 for acute AMS, intermittent hypoglycemia, AFib with RVR. Perc Choley tube placed on 9/11 for enlarged GB.     Recommendations  Continue WTD dressings tid  Would recommend prn bolus for insensible losses from midline wound and increased Creatinine today  Care per primary team and SICU  Surgery Team 4C will continue to follow. Please page Team 4 with questions/clinical changes. [phone]

## 2023-09-21 NOTE — PROGRESS NOTE ADULT - ASSESSMENT
78M w/ Crohn's, AFib/Flutter s/p DCCVs on amiodarone, remote ileocectomy and open appy here for SBO vs Crohns flare, s/p NGT decompression and now s/p lap TRE converted to open TRE, SBR x 3, left in discontinuity with abthera vac on 6/27, RTOR for ileocolic resection, small bowel anastomosis, and abdominal wall closure on 6/28, and s/p IR aspiration of perihepatic fluid on 7/3, stepped down to telemetry on 7/4. wound dehiscence on 7/5, RTOR ex-lap, washout, ileocolic resection with end ileostomy, blow hole colostomy, red rubber from ileostomy to small bowel anastomosis; Vicryl bridging mesh on 7/5. Transferred to SICU post op for HD monitoring. Extubated 7/6. Surgical wound with decreasing in size and granulating with Vac. Tentative plan for skin graft per Plastics; timing TBD pending proper wound shrinkage and granulation   8/23: Upgraded to SICU 8/23 for acute delirium and tremors, r/o sepsis vs metabolic encephalopathy. Ammonia levels normal    8/25: Spiked temp to 101.3 overnight w/ new leukocytosis to 14   8/28: Downgraded from SICU. No growth from blood cultures. Abx x 5 days until 8/29 8/30: Leukocytosis resolved   9/1: Decreased UOP, soft pressures. Cr 3.08 from 1.12. C/f dehydration 2/2 high fistula output vs ELVI   9/2: Upgraded to SICU for worsening ELVI. Stepped down 9/6   9/10: Upgraded to SICU for AMS, hypoglycemia and A-fib.   9/11: In setting of transaminitis, hyperbilirubinemia, leukocytosis and GB distention, underwent IR perc cony     Hemodynamically normal and afebrile. Low fistula output. Skin graft will not be feasible per plastics given frequent leakage from fistula into wound bed.   Slight drop in urine output with simultaneous increase in fistula and JOYCE drain output since restarting clears. Output from JOYCE and RUQ ileostomy clear    Nausea/emesis; CT A/P overnight pending read   CT A/P 9/20 unremarkable   AXR 9/20 showed contrast pooling in stomach    Plan:   - Sips of clears   - TPN  - Continue wet-to-dry packing Q4 vs Q6 (see instructions in chart/event note on 9/18)   - Monitor perc cony, ostomy and JOYCE drain output  - PRN pain and nausea control   - Hx of A-fib/flutter; continue Lopressor   - DVT chemo & mechanical ppx   - OOB/PT     D/w SICU, chief resident and attending  78M w/ Crohn's, AFib/Flutter s/p DCCVs on amiodarone, remote ileocectomy and open appy here for SBO vs Crohns flare, s/p NGT decompression and now s/p lap TRE converted to open TRE, SBR x 3, left in discontinuity with abthera vac on 6/27, RTOR for ileocolic resection, small bowel anastomosis, and abdominal wall closure on 6/28, and s/p IR aspiration of perihepatic fluid on 7/3, stepped down to telemetry on 7/4. wound dehiscence on 7/5, RTOR ex-lap, washout, ileocolic resection with end ileostomy, blow hole colostomy, red rubber from ileostomy to small bowel anastomosis; Vicryl bridging mesh on 7/5. Transferred to SICU post op for HD monitoring. Extubated 7/6. Surgical wound with decreasing in size and granulating with Vac. Tentative plan for skin graft per Plastics; timing TBD pending proper wound shrinkage and granulation   8/23: Upgraded to SICU 8/23 for acute delirium and tremors, r/o sepsis vs metabolic encephalopathy. Ammonia levels normal    8/25: Spiked temp to 101.3 overnight w/ new leukocytosis to 14   8/28: Downgraded from SICU. No growth from blood cultures. Abx x 5 days until 8/29 8/30: Leukocytosis resolved   9/1: Decreased UOP, soft pressures. Cr 3.08 from 1.12. C/f dehydration 2/2 high fistula output vs ELVI   9/2: Upgraded to SICU for worsening ELVI. Stepped down 9/6   9/10: Upgraded to SICU for AMS, hypoglycemia and A-fib.   9/11: In setting of transaminitis, hyperbilirubinemia, leukocytosis and GB distention, underwent IR perc cony     Hemodynamically normal and afebrile. Low fistula output. Skin graft will not be feasible per plastics given frequent leakage from fistula into wound bed.   Slight drop in urine output with simultaneous increase in fistula and JOYCE drain output since restarting clears. Output from JOYCE and RUQ ileostomy clear    Nausea/emesis; CT A/P overnight pending read   CT A/P 9/20 unremarkable   AXR 9/20 showed contrast pooling in stomach c/f gastroparesis. Erythromycin given     Plan:   - NPO/IVF/TPN  - Continue wet-to-dry packing Q4 vs Q6 (see instructions in chart/event note on 9/18)   - Monitor perc cony, ostomy and JOYCE drain output  - PRN pain and nausea control   - Hx of A-fib/flutter; continue Lopressor   - DVT chemo & mechanical ppx   - OOB/PT     D/w SICU, chief resident and attending

## 2023-09-21 NOTE — PROGRESS NOTE ADULT - SUBJECTIVE AND OBJECTIVE BOX
SUBJECTIVE:  Patient seen and examined at bedside this AM; mentating appropriately and in no acute distress   Still requiring frequent wet-to-dry dressing changes   Spontaneously voiding adequately   No abdominal pain, nausea, emesis or chills endorsed this AM      MEDICATIONS  (STANDING):  chlorhexidine 2% Cloths 1 Application(s) Topical <User Schedule>  dextrose 5%. 1000 milliLiter(s) (50 mL/Hr) IV Continuous <Continuous>  dextrose 5%. 1000 milliLiter(s) (100 mL/Hr) IV Continuous <Continuous>  dextrose 50% Injectable 25 Gram(s) IV Push once  dextrose 50% Injectable 12.5 Gram(s) IV Push once  dextrose 50% Injectable 25 Gram(s) IV Push once  erythromycin   IVPB 250 milliGRAM(s) IV Intermittent every 6 hours  glucagon  Injectable 1 milliGRAM(s) IntraMuscular once  heparin   Injectable 5000 Unit(s) SubCutaneous every 8 hours  insulin lispro (ADMELOG) corrective regimen sliding scale   SubCutaneous every 6 hours  levothyroxine Injectable 40 MICROGram(s) IV Push at bedtime  lipid, fat emulsion (Fish Oil and Plant Based) 20% Infusion 1 Gm/kG/Day (41.67 mL/Hr) IV Continuous <Continuous>  loperamide 4 milliGRAM(s) Oral every 6 hours  melatonin 5 milliGRAM(s) Oral at bedtime  metoprolol tartrate Injectable 5 milliGRAM(s) IV Push every 6 hours  nystatin Powder 1 Application(s) Topical three times a day  Parenteral Nutrition - Adult 1 Each (83 mL/Hr) TPN Continuous <Continuous>    MEDICATIONS  (PRN):  benzocaine/menthol Lozenge 2 Lozenge Oral every 4 hours PRN Sore Throat  dextrose Oral Gel 15 Gram(s) Oral once PRN Blood Glucose LESS THAN 70 milliGRAM(s)/deciliter  ondansetron Injectable 4 milliGRAM(s) IV Push every 8 hours PRN Nausea and/or Vomiting      Vital Signs Last 24 Hrs  T(C): 36.4 (21 Sep 2023 11:00), Max: 37.3 (20 Sep 2023 18:04)  T(F): 97.5 (21 Sep 2023 11:00), Max: 99.1 (20 Sep 2023 18:04)  HR: 80 (21 Sep 2023 11:00) (79 - 85)  BP: 140/68 (21 Sep 2023 11:00) (80/60 - 178/78)  BP(mean): 96 (21 Sep 2023 11:00) (79 - 112)  RR: 26 (21 Sep 2023 11:00) (16 - 57)  SpO2: 96% (21 Sep 2023 11:00) (94% - 98%)    Parameters below as of 21 Sep 2023 11:00  Patient On (Oxygen Delivery Method): room air    Physical Exam:  GENERAL: NAD, resting comfortably in bed  HEENT: NCAT, MMM  C/V: Normal rate, normal peripheral perfusion, no murmurs  PULM: Nonlabored breathing, no respiratory distress in room aur  ABD: Soft, ND, NT, no rebound tenderness, no guarding. Wet-to-dry dressing over wound clean with minimal stool staining. Wound bed clean-appearing. Ileostomy with minimal output (80 cc/24 hr), perc cony with bilious output (390 cc/24 hr). JOYCE in LUQ with minimal output (30 cc/24 hr)  : Voiding spontaneously and adequately   EXTREM: WWP, no edema, SCDs in place  NEURO: No focal deficits     I&O's Summary    20 Sep 2023 07:01  -  21 Sep 2023 07:00  --------------------------------------------------------  IN: 4405.8 mL / OUT: 2200 mL / NET: 2205.8 mL    21 Sep 2023 07:01  -  21 Sep 2023 12:06  --------------------------------------------------------  IN: 1815 mL / OUT: 850 mL / NET: 965 mL        LABS:                        9.0    8.96  )-----------( 261      ( 21 Sep 2023 05:43 )             28.4     09-21    136  |  102  |  35<H>  ----------------------------<  133<H>  3.9   |  26  |  1.28    Ca    8.5      21 Sep 2023 05:43  Phos  3.9     09-21  Mg     2.2     09-21    TPro  8.9<H>  /  Alb  2.4<L>  /  TBili  3.8<H>  /  DBili  2.6<H>  /  AST  87<H>  /  ALT  63<H>  /  AlkPhos  196<H>  09-20      Urinalysis Basic - ( 21 Sep 2023 05:43 )    Color: x / Appearance: x / SG: x / pH: x  Gluc: 133 mg/dL / Ketone: x  / Bili: x / Urobili: x   Blood: x / Protein: x / Nitrite: x   Leuk Esterase: x / RBC: x / WBC x   Sq Epi: x / Non Sq Epi: x / Bacteria: x      CAPILLARY BLOOD GLUCOSE      POCT Blood Glucose.: 115 mg/dL (20 Sep 2023 23:21)  POCT Blood Glucose.: 112 mg/dL (20 Sep 2023 18:00)    LIVER FUNCTIONS - ( 20 Sep 2023 06:27 )  Alb: 2.4 g/dL / Pro: 8.9 g/dL / ALK PHOS: 196 U/L / ALT: 63 U/L / AST: 87 U/L / GGT: x             RADIOLOGY & ADDITIONAL STUDIES:

## 2023-09-21 NOTE — PROGRESS NOTE ADULT - SUBJECTIVE AND OBJECTIVE BOX
SUBJECTIVE:   Patient seen and examined at bedside this AM; mentating appropriately and in no acute distress   Still requiring frequent wet-to-dry dressing changes   Spontaneously voiding adequately   No abdominal pain, nausea, emesis or chills endorsed this AM      MEDICATIONS  (STANDING):  chlorhexidine 2% Cloths 1 Application(s) Topical <User Schedule>  dextrose 5%. 1000 milliLiter(s) (50 mL/Hr) IV Continuous <Continuous>  dextrose 5%. 1000 milliLiter(s) (100 mL/Hr) IV Continuous <Continuous>  dextrose 50% Injectable 25 Gram(s) IV Push once  dextrose 50% Injectable 12.5 Gram(s) IV Push once  dextrose 50% Injectable 25 Gram(s) IV Push once  erythromycin   IVPB 250 milliGRAM(s) IV Intermittent every 6 hours  glucagon  Injectable 1 milliGRAM(s) IntraMuscular once  heparin   Injectable 5000 Unit(s) SubCutaneous every 8 hours  insulin lispro (ADMELOG) corrective regimen sliding scale   SubCutaneous every 6 hours  levothyroxine Injectable 40 MICROGram(s) IV Push at bedtime  lipid, fat emulsion (Fish Oil and Plant Based) 20% Infusion 1 Gm/kG/Day (41.67 mL/Hr) IV Continuous <Continuous>  melatonin 5 milliGRAM(s) Oral at bedtime  metoprolol tartrate Injectable 5 milliGRAM(s) IV Push every 6 hours  nystatin Powder 1 Application(s) Topical three times a day  Parenteral Nutrition - Adult 1 Each (83 mL/Hr) TPN Continuous <Continuous>  potassium chloride  10 mEq/50 mL IVPB 10 milliEquivalent(s) IV Intermittent once    MEDICATIONS  (PRN):  benzocaine/menthol Lozenge 2 Lozenge Oral every 4 hours PRN Sore Throat  dextrose Oral Gel 15 Gram(s) Oral once PRN Blood Glucose LESS THAN 70 milliGRAM(s)/deciliter  ondansetron Injectable 4 milliGRAM(s) IV Push every 8 hours PRN Nausea and/or Vomiting      Vital Signs Last 24 Hrs  T(C): 36.8 (21 Sep 2023 06:03), Max: 37.3 (20 Sep 2023 18:04)  T(F): 98.2 (21 Sep 2023 06:03), Max: 99.1 (20 Sep 2023 18:04)  HR: 82 (21 Sep 2023 06:00) (72 - 85)  BP: 129/62 (21 Sep 2023 06:00) (80/60 - 183/81)  BP(mean): 89 (21 Sep 2023 06:00) (79 - 117)  RR: 57 (21 Sep 2023 06:00) (11 - 57)  SpO2: 94% (21 Sep 2023 06:00) (94% - 98%)    Parameters below as of 21 Sep 2023 06:00  Patient On (Oxygen Delivery Method): room air        Physical Exam:  GENERAL: NAD, resting comfortably in bed  HEENT: NCAT, MMM  C/V: Normal rate, normal peripheral perfusion, no murmurs  PULM: Nonlabored breathing, no respiratory distress in room aur  ABD: Soft, ND, NT, no rebound tenderness, no guarding. Wet-to-dry dressing over wound clean with minimal stool staining. Wound bed clean-appearing. Ileostomy with minimal output (80 cc/24 hr), perc cony with bilious output (390 cc/24 hr). JOYCE in LUQ with minimal output (30 cc/24 hr)  : Voiding spontaneously and adequately   EXTREM: WWP, no edema, SCDs in place  NEURO: No focal deficits       I&O's Summary    19 Sep 2023 07:01  -  20 Sep 2023 07:00  --------------------------------------------------------  IN: 3283.8 mL / OUT: 2545 mL / NET: 738.8 mL    20 Sep 2023 07:01  -  21 Sep 2023 06:36  --------------------------------------------------------  IN: 4322.8 mL / OUT: 1780 mL / NET: 2542.8 mL        LABS:                        9.0    8.96  )-----------( 261      ( 21 Sep 2023 05:43 )             28.4     09-21    136  |  102  |  35<H>  ----------------------------<  133<H>  3.9   |  26  |  1.28    Ca    8.5      21 Sep 2023 05:43  Phos  3.9     09-21  Mg     2.2     09-21    TPro  8.9<H>  /  Alb  2.4<L>  /  TBili  3.8<H>  /  DBili  2.6<H>  /  AST  87<H>  /  ALT  63<H>  /  AlkPhos  196<H>  09-20      Urinalysis Basic - ( 21 Sep 2023 05:43 )    Color: x / Appearance: x / SG: x / pH: x  Gluc: 133 mg/dL / Ketone: x  / Bili: x / Urobili: x   Blood: x / Protein: x / Nitrite: x   Leuk Esterase: x / RBC: x / WBC x   Sq Epi: x / Non Sq Epi: x / Bacteria: x      CAPILLARY BLOOD GLUCOSE      POCT Blood Glucose.: 115 mg/dL (20 Sep 2023 23:21)  POCT Blood Glucose.: 112 mg/dL (20 Sep 2023 18:00)  POCT Blood Glucose.: 137 mg/dL (20 Sep 2023 11:14)    LIVER FUNCTIONS - ( 20 Sep 2023 06:27 )  Alb: 2.4 g/dL / Pro: 8.9 g/dL / ALK PHOS: 196 U/L / ALT: 63 U/L / AST: 87 U/L / GGT: x             RADIOLOGY & ADDITIONAL STUDIES:

## 2023-09-21 NOTE — PROGRESS NOTE ADULT - SUBJECTIVE AND OBJECTIVE BOX
Interval Events:  Patient seen and examined at bedside.      Allergies    penicillin (Angioedema)    Intolerances        Vital Signs Last 24 Hrs  T(C): 36.8 (21 Sep 2023 06:03), Max: 37.3 (20 Sep 2023 18:04)  T(F): 98.2 (21 Sep 2023 06:03), Max: 99.1 (20 Sep 2023 18:04)  HR: 82 (21 Sep 2023 06:00) (72 - 85)  BP: 129/62 (21 Sep 2023 06:00) (80/60 - 183/81)  BP(mean): 89 (21 Sep 2023 06:00) (79 - 117)  RR: 57 (21 Sep 2023 06:00) (11 - 57)  SpO2: 94% (21 Sep 2023 06:00) (94% - 98%)    Parameters below as of 21 Sep 2023 06:00  Patient On (Oxygen Delivery Method): room air        09-19 @ 07:01  -  09-20 @ 07:00  --------------------------------------------------------  IN: 3283.8 mL / OUT: 2545 mL / NET: 738.8 mL    09-20 @ 07:01 - 09-21 @ 06:26  --------------------------------------------------------  IN: 4322.8 mL / OUT: 1780 mL / NET: 2542.8 mL      09-19 @ 07:01  -  09-20 @ 07:00  --------------------------------------------------------  IN: 3283.8 mL / OUT: 2545 mL / NET: 738.8 mL    09-20 @ 07:01  -  09-21 @ 06:26  --------------------------------------------------------  IN: 4322.8 mL / OUT: 1780 mL / NET: 2542.8 mL        Physical Exam:     GENERAL: NAD, resting comfortably in bed  NEURO: No focal deficits  HEENT: NCAT, MMM  C/V: Normal rate, normal peripheral perfusion, no murmurs  PULM: Nonlabored breathing, no respiratory distress, CTA b/l  ABD: Soft, ND, NT, no rebound tenderness, no guarding, ostomy bag over blow hole with mccauley in place draining clear fluid. Open midline wound draining clear fluid.  JOYCE draining minimal ss fluid.   EXTREM: WWP, no edema, SCDs in place  MSK: No jt swelling noted.  Skin: No rashes noted  Psych: No signs of anxiety or depression       LABS:      CBC Full  -  ( 21 Sep 2023 05:43 )  WBC Count : 8.96 K/uL  RBC Count : 3.12 M/uL  Hemoglobin : 9.0 g/dL  Hematocrit : 28.4 %  Platelet Count - Automated : 261 K/uL  Mean Cell Volume : 91.0 fl  Mean Cell Hemoglobin : 28.8 pg  Mean Cell Hemoglobin Concentration : 31.7 gm/dL  Auto Neutrophil # : x  Auto Lymphocyte # : x  Auto Monocyte # : x  Auto Eosinophil # : x  Auto Basophil # : x  Auto Neutrophil % : x  Auto Lymphocyte % : x  Auto Monocyte % : x  Auto Eosinophil % : x  Auto Basophil % : x    09-21    136  |  102  |  35<H>  ----------------------------<  133<H>  3.9   |  26  |  1.28    Ca    8.5      21 Sep 2023 05:43  Phos  3.9     09-21  Mg     2.2     09-21    TPro  8.9<H>  /  Alb  2.4<L>  /  TBili  3.8<H>  /  DBili  2.6<H>  /  AST  87<H>  /  ALT  63<H>  /  AlkPhos  196<H>  09-20          Urinalysis Basic - ( 21 Sep 2023 05:43 )    Color: x / Appearance: x / SG: x / pH: x  Gluc: 133 mg/dL / Ketone: x  / Bili: x / Urobili: x   Blood: x / Protein: x / Nitrite: x   Leuk Esterase: x / RBC: x / WBC x   Sq Epi: x / Non Sq Epi: x / Bacteria: x              RADIOLOGY & ADDITIONAL STUDIES (The following images were personally reviewed):          A/p:   A/p:78 M w/ Crohn's, AFib/Flutter s/p DCCVs on amiodarone, remote ileocectomy and open appendectomy. Admitted (6/23) for SBO vs Crohns flare, s/p NGT decompression and s/p lap TRE converted to open TRE, SBR x 3, left in discontinuity with abthera vac on (6/27), RTOR for ileocolic resection, small bowel anastomosis, and abdominal wall closure on (6/28), c/b fluid collection s/p IR aspiration of perihepatic fluid on (7/3), c/b wound dehiscence s/p RTOR exlap, washout, ileocolic resection with end ileostomy, blow hole colostomy, red rubber from ileostomy to small bowel anastomosis; vicryl bridging mesh on (7/5) transferred to SICU postoperatively for hemodynamic monitoring, with hospital course complicated by periods of AMS most recently on 9/1 and associated with oliguria, severe ELVI and hyponatremia, which resolved but now stepped back up for to SICU on 9/10 for acute AMS, intermittent hypoglycemia, AFib with RVR. Perc Choley tube placed on 9/11 for enlarged GB.     NEURO: AMS Resolved.  EEG neg, follow Neurology recs. Hx of depression - effexor Dc'd on 9/10. Pain control with Dilaudid for vac changes only. Cont Melatonin prn Insomnia, Cont Zofran   HENT: Cepacol  CV: Hx Afib/flutter: s/p DCCV, Off amiodarone given transaminitis; Continue Metoprolol 5q6. Hx HTN - Holding Norvasc, Losartan. Hx HLD: hold Lipitor given LFTs. TTE  (7/18) - PASP 64mmHg, EF 65-70%,   PULM: Saturating well on RA.   GI/FEN: CT scan showing Contrast not progressing past pyloris: Cont NPO with Sips.  dc Imodium/Lomotil. Dc Cholestyramine packets Cont Octreotide in TPN for high ostomy and ECF output. PICC/TPN; Lipids @100G, Dex@28/kg, AA @1.6   transaminitis of unknown origin, Now stable s/p Perc Choley on 9/11. PPI. Delayed gastric emptying: Cont Erythromycin  : Voids, S/p ELVI now resolved.  Nephrology Signed off   ENDO: mISS. Hx hypothyroid: IV Synthroid, TSH 2.180 (9.170), Recurrent Hypoglycemia on TPN- now resolved  ID: Numerous SICU admissions for sepsis. Currently off abx. Nystatin powder  //Dc'd: Previously had C. tertium, Lactobacillis from his IR cx 7/3, and candida albicans, lactobacillus, vanc sensitive E faecium, vanc resistant E gallinarum, vanc resistant E casseliflavus, lactobacillus from his OR cx 7/5. Completed course of abx with Imipenem (9/10-9/15). Imipenem (8/26--) imipenem (6/30-7/12, 7/23-7/30), Dapto (6/30-7/5 and 7/23-7/24).  empiric dapto (8/23-24) and cefepime (8/23-24).   PPX: SCDs, SQH  LINES: PIVs, LUE PICC (9/1 - )  WOUNDS/DRAINS: Abdominal wound and fistula with wet to dry dressing changes by nurse q 3hrs and prn, Rt LQ Ostomy. JOYCE.   PT: Ordered  DISPO: Possible Step down this week.  Interval Events:  No events overnight   Patient seen and examined at bedside.      Allergies    penicillin (Angioedema)    Intolerances        Vital Signs Last 24 Hrs  T(C): 36.8 (21 Sep 2023 06:03), Max: 37.3 (20 Sep 2023 18:04)  T(F): 98.2 (21 Sep 2023 06:03), Max: 99.1 (20 Sep 2023 18:04)  HR: 82 (21 Sep 2023 06:00) (72 - 85)  BP: 129/62 (21 Sep 2023 06:00) (80/60 - 183/81)  BP(mean): 89 (21 Sep 2023 06:00) (79 - 117)  RR: 57 (21 Sep 2023 06:00) (11 - 57)  SpO2: 94% (21 Sep 2023 06:00) (94% - 98%)    Parameters below as of 21 Sep 2023 06:00  Patient On (Oxygen Delivery Method): room air        09-19 @ 07:01  -  09-20 @ 07:00  --------------------------------------------------------  IN: 3283.8 mL / OUT: 2545 mL / NET: 738.8 mL    09-20 @ 07:01 - 09-21 @ 06:26  --------------------------------------------------------  IN: 4322.8 mL / OUT: 1780 mL / NET: 2542.8 mL      09-19 @ 07:01  -  09-20 @ 07:00  --------------------------------------------------------  IN: 3283.8 mL / OUT: 2545 mL / NET: 738.8 mL    09-20 @ 07:01  -  09-21 @ 06:26  --------------------------------------------------------  IN: 4322.8 mL / OUT: 1780 mL / NET: 2542.8 mL        Physical Exam:     GENERAL: NAD, resting comfortably in a chair   NEURO: No focal deficits  HEENT: NCAT, MMM  C/V: Normal rate, normal peripheral perfusion, no murmurs  PULM: Nonlabored breathing, no respiratory distress, CTA b/l  ABD: Soft, ND, NT, no rebound tenderness, no guarding, ostomy bag over blow hole with mccauley in place draining clear fluid. Open midline wound draining clear fluid.  JOYCE draining minimal ss fluid.   EXTREM: WWP, no edema, SCDs in place  MSK: No jt swelling noted.  Skin: No rashes noted  Psych: No signs of anxiety or depression       LABS:      CBC Full  -  ( 21 Sep 2023 05:43 )  WBC Count : 8.96 K/uL  RBC Count : 3.12 M/uL  Hemoglobin : 9.0 g/dL  Hematocrit : 28.4 %  Platelet Count - Automated : 261 K/uL  Mean Cell Volume : 91.0 fl  Mean Cell Hemoglobin : 28.8 pg  Mean Cell Hemoglobin Concentration : 31.7 gm/dL  Auto Neutrophil # : x  Auto Lymphocyte # : x  Auto Monocyte # : x  Auto Eosinophil # : x  Auto Basophil # : x  Auto Neutrophil % : x  Auto Lymphocyte % : x  Auto Monocyte % : x  Auto Eosinophil % : x  Auto Basophil % : x    09-21    136  |  102  |  35<H>  ----------------------------<  133<H>  3.9   |  26  |  1.28    Ca    8.5      21 Sep 2023 05:43  Phos  3.9     09-21  Mg     2.2     09-21    TPro  8.9<H>  /  Alb  2.4<L>  /  TBili  3.8<H>  /  DBili  2.6<H>  /  AST  87<H>  /  ALT  63<H>  /  AlkPhos  196<H>  09-20          Urinalysis Basic - ( 21 Sep 2023 05:43 )    Color: x / Appearance: x / SG: x / pH: x  Gluc: 133 mg/dL / Ketone: x  / Bili: x / Urobili: x   Blood: x / Protein: x / Nitrite: x   Leuk Esterase: x / RBC: x / WBC x   Sq Epi: x / Non Sq Epi: x / Bacteria: x              RADIOLOGY & ADDITIONAL STUDIES (The following images were personally reviewed):          A/p:   A/p:78 M w/ Crohn's, AFib/Flutter s/p DCCVs on amiodarone, remote ileocectomy and open appendectomy. Admitted (6/23) for SBO vs Crohns flare, s/p NGT decompression and s/p lap TRE converted to open TRE, SBR x 3, left in discontinuity with abthera vac on (6/27), RTOR for ileocolic resection, small bowel anastomosis, and abdominal wall closure on (6/28), c/b fluid collection s/p IR aspiration of perihepatic fluid on (7/3), c/b wound dehiscence s/p RTOR exlap, washout, ileocolic resection with end ileostomy, blow hole colostomy, red rubber from ileostomy to small bowel anastomosis; vicryl bridging mesh on (7/5) transferred to SICU postoperatively for hemodynamic monitoring, with hospital course complicated by periods of AMS most recently on 9/1 and associated with oliguria, severe ELVI and hyponatremia, which resolved but now stepped back up for to SICU on 9/10 for acute AMS, intermittent hypoglycemia, AFib with RVR. Perc Choley tube placed on 9/11 for enlarged GB.     NEURO: AMS Resolved.  EEG neg, follow Neurology recs. Hx of depression - effexor Dc'd on 9/10. Pain control with Dilaudid for vac changes only. Cont Melatonin prn Insomnia, Cont Zofran ? Virtigo- resolved.    HENT: Cepacol  CV: Hx Afib/flutter: s/p DCCV, Off amiodarone given transaminitis; Continue Metoprolol 5q6. Hx HTN - Holding Norvasc, Losartan. Hx HLD: hold Lipitor given LFTs. TTE  (7/18) - PASP 64mmHg, EF 65-70%,   PULM: Saturating well on RA.   GI/FEN: CT scan showing Contrast not progressing past pyloris: Cont NPO with Sips.  dc Imodium/Lomotil. Dc Cholestyramine packets Cont Octreotide in TPN for high ostomy and ECF output. PICC/TPN; Lipids @100G, Dex@28/kg, AA @1.6 transaminitis of unknown origin, Now stable s/p Perc Choley on 9/11. PPI. Delayed gastric emptying: Cont Erythromycin  : Voids, S/p ELVI now resolved.  Nephrology Signed off   ENDO: . Hx hypothyroid: IV Synthroid, TSH 2.180 (9.170), Recurrent Hypoglycemia on TPN- now resolved  ID: Numerous SICU admissions for sepsis. Currently off abx. Nystatin powder  //Dc'd: Previously had C. tertium, Lactobacillis from his IR cx 7/3, and candida albicans, lactobacillus, vanc sensitive E faecium, vanc resistant E gallinarum, vanc resistant E casseliflavus, lactobacillus from his OR cx 7/5. Completed course of abx with Imipenem (9/10-9/15). Imipenem (8/26--) imipenem (6/30-7/12, 7/23-7/30), Dapto (6/30-7/5 and 7/23-7/24).  empiric dapto (8/23-24) and cefepime (8/23-24).   PPX: SCDs, SQH  LINES: PIVs, LUE PICC (9/1 - )  WOUNDS/DRAINS: Abdominal wound and fistula with wet to dry dressing changes by nurse q 3hrs and prn, Rt LQ Ostomy. JOYCE.   PT: Ordered Out of bed to chair walk in halls.   DISPO: Possible Step down this week.  Interval Events:  No events overnight   Patient seen and examined at bedside.      Allergies    penicillin (Angioedema)    Intolerances        Vital Signs Last 24 Hrs  T(C): 36.8 (21 Sep 2023 06:03), Max: 37.3 (20 Sep 2023 18:04)  T(F): 98.2 (21 Sep 2023 06:03), Max: 99.1 (20 Sep 2023 18:04)  HR: 82 (21 Sep 2023 06:00) (72 - 85)  BP: 129/62 (21 Sep 2023 06:00) (80/60 - 183/81)  BP(mean): 89 (21 Sep 2023 06:00) (79 - 117)  RR: 57 (21 Sep 2023 06:00) (11 - 57)  SpO2: 94% (21 Sep 2023 06:00) (94% - 98%)    Parameters below as of 21 Sep 2023 06:00  Patient On (Oxygen Delivery Method): room air        09-19 @ 07:01  -  09-20 @ 07:00  --------------------------------------------------------  IN: 3283.8 mL / OUT: 2545 mL / NET: 738.8 mL    09-20 @ 07:01 - 09-21 @ 06:26  --------------------------------------------------------  IN: 4322.8 mL / OUT: 1780 mL / NET: 2542.8 mL      09-19 @ 07:01  -  09-20 @ 07:00  --------------------------------------------------------  IN: 3283.8 mL / OUT: 2545 mL / NET: 738.8 mL    09-20 @ 07:01  -  09-21 @ 06:26  --------------------------------------------------------  IN: 4322.8 mL / OUT: 1780 mL / NET: 2542.8 mL        Physical Exam:     GENERAL: NAD, resting comfortably in a chair   NEURO: No focal deficits  HEENT: NCAT, MMM  C/V: Normal rate, normal peripheral perfusion, no murmurs  PULM: Nonlabored breathing, no respiratory distress, CTA b/l  ABD: Soft, ND, NT, no rebound tenderness, no guarding, ostomy bag over blow hole with mccauley in place draining clear fluid. Open midline wound draining clear fluid.  JOYCE draining minimal ss fluid.   EXTREM: WWP, no edema, SCDs in place  MSK: No jt swelling noted.  Skin: No rashes noted  Psych: No signs of anxiety or depression       LABS:      CBC Full  -  ( 21 Sep 2023 05:43 )  WBC Count : 8.96 K/uL  RBC Count : 3.12 M/uL  Hemoglobin : 9.0 g/dL  Hematocrit : 28.4 %  Platelet Count - Automated : 261 K/uL  Mean Cell Volume : 91.0 fl  Mean Cell Hemoglobin : 28.8 pg  Mean Cell Hemoglobin Concentration : 31.7 gm/dL  Auto Neutrophil # : x  Auto Lymphocyte # : x  Auto Monocyte # : x  Auto Eosinophil # : x  Auto Basophil # : x  Auto Neutrophil % : x  Auto Lymphocyte % : x  Auto Monocyte % : x  Auto Eosinophil % : x  Auto Basophil % : x    09-21    136  |  102  |  35<H>  ----------------------------<  133<H>  3.9   |  26  |  1.28    Ca    8.5      21 Sep 2023 05:43  Phos  3.9     09-21  Mg     2.2     09-21    TPro  8.9<H>  /  Alb  2.4<L>  /  TBili  3.8<H>  /  DBili  2.6<H>  /  AST  87<H>  /  ALT  63<H>  /  AlkPhos  196<H>  09-20          Urinalysis Basic - ( 21 Sep 2023 05:43 )    Color: x / Appearance: x / SG: x / pH: x  Gluc: 133 mg/dL / Ketone: x  / Bili: x / Urobili: x   Blood: x / Protein: x / Nitrite: x   Leuk Esterase: x / RBC: x / WBC x   Sq Epi: x / Non Sq Epi: x / Bacteria: x              RADIOLOGY & ADDITIONAL STUDIES (The following images were personally reviewed):            A/p:78 M w/ Crohn's, AFib/Flutter s/p DCCVs on amiodarone, remote ileocectomy and open appendectomy. Admitted (6/23) for SBO vs Crohns flare, s/p NGT decompression and s/p lap TRE converted to open TRE, SBR x 3, left in discontinuity with abthera vac on (6/27), RTOR for ileocolic resection, small bowel anastomosis, and abdominal wall closure on (6/28), c/b fluid collection s/p IR aspiration of perihepatic fluid on (7/3), c/b wound dehiscence s/p RTOR exlap, washout, ileocolic resection with end ileostomy, blow hole colostomy, red rubber from ileostomy to small bowel anastomosis; vicryl bridging mesh on (7/5) transferred to SICU postoperatively for hemodynamic monitoring, with hospital course complicated by periods of AMS most recently on 9/1 and associated with oliguria, severe ELVI and hyponatremia, which resolved but now stepped back up for to SICU on 9/10 for acute AMS, intermittent hypoglycemia, AFib with RVR. Perc Choley tube placed on 9/11 for enlarged GB.     NEURO: AMS Resolved.  EEG neg, follow Neurology recs. Hx of depression - effexor Dc'd on 9/10. Pain control with Dilaudid for vac changes only. Cont Melatonin prn Insomnia, Cont Zofran ? Virtigo- resolved.    HENT: Cepacol  CV: Hx Afib/flutter: s/p DCCV, Off amiodarone given transaminitis; Continue Metoprolol 5q6. Hx HTN - Holding Norvasc, Losartan. Hx HLD: hold Lipitor given LFTs. TTE  (7/18) - PASP 64mmHg, EF 65-70%,   PULM: Saturating well on RA.   GI/FEN: CT scan showing Contrast not progressing past pyloris: Cont NPO, restart Imodium Holding Lomotil. Cont Octreotide in TPN for high ostomy and ECF output. PICC/TPN; Lipids @100G, Dex@27/kg, AA @1.6 transaminitis of unknown origin, Now stable s/p Perc Choley on 9/11. PPI.   : Voids, S/p ELVI now resolved.  Nephrology Signed off   ENDO: . Hx hypothyroid: IV Synthroid, TSH 2.180 (9.170), Recurrent Hypoglycemia on TPN- now resolved  ID: Numerous SICU admissions for sepsis. Currently off abx. Cont Nystatin powder  //Dc'd: Previously had C. tertium, Lactobacillis from his IR cx 7/3, and candida albicans, lactobacillus, vanc sensitive E faecium, vanc resistant E gallinarum, vanc resistant E casseliflavus, lactobacillus from his OR cx 7/5. Completed course of abx with Imipenem (9/10-9/15). Imipenem (8/26--) imipenem (6/30-7/12, 7/23-7/30), Dapto (6/30-7/5 and 7/23-7/24).  empiric dapto (8/23-24) and cefepime (8/23-24).   PPX: SCDs, SQH  LINES: PIVs, LUE PICC (9/1- )  WOUNDS/DRAINS: Abdominal wound and fistula with wet to dry dressing changes by nurse q 3hrs and prn, Rt LQ Ostomy. JOYCE.   PT: Ordered Out of bed to chair walk in halls.   DISPO: Possible Step down this week.  Interval Events:  No events overnight   Patient seen and examined at bedside.      Allergies    penicillin (Angioedema)    Intolerances        Vital Signs Last 24 Hrs  T(C): 36.8 (21 Sep 2023 06:03), Max: 37.3 (20 Sep 2023 18:04)  T(F): 98.2 (21 Sep 2023 06:03), Max: 99.1 (20 Sep 2023 18:04)  HR: 82 (21 Sep 2023 06:00) (72 - 85)  BP: 129/62 (21 Sep 2023 06:00) (80/60 - 183/81)  BP(mean): 89 (21 Sep 2023 06:00) (79 - 117)  RR: 57 (21 Sep 2023 06:00) (11 - 57)  SpO2: 94% (21 Sep 2023 06:00) (94% - 98%)    Parameters below as of 21 Sep 2023 06:00  Patient On (Oxygen Delivery Method): room air        09-19 @ 07:01  -  09-20 @ 07:00  --------------------------------------------------------  IN: 3283.8 mL / OUT: 2545 mL / NET: 738.8 mL    09-20 @ 07:01 - 09-21 @ 06:26  --------------------------------------------------------  IN: 4322.8 mL / OUT: 1780 mL / NET: 2542.8 mL      09-19 @ 07:01  -  09-20 @ 07:00  --------------------------------------------------------  IN: 3283.8 mL / OUT: 2545 mL / NET: 738.8 mL    09-20 @ 07:01  -  09-21 @ 06:26  --------------------------------------------------------  IN: 4322.8 mL / OUT: 1780 mL / NET: 2542.8 mL        Physical Exam:     GENERAL: NAD, resting comfortably in a chair   NEURO: No focal deficits  HEENT: NCAT, Dry mouth  C/V: Normal rate, normal peripheral perfusion, no murmurs  PULM: Nonlabored breathing, no respiratory distress, CTA b/l  ABD: Soft, ND, NT, no rebound tenderness, no guarding, ostomy bag over blow hole with mccauley in place draining clear fluid. Open midline wound draining clear fluid.  JOYCE draining minimal ss fluid.   EXTREM: WWP, no edema, SCDs in place  MSK: No jt swelling noted.  Skin: No rashes noted  Psych: No signs of anxiety or depression       LABS:      CBC Full  -  ( 21 Sep 2023 05:43 )  WBC Count : 8.96 K/uL  RBC Count : 3.12 M/uL  Hemoglobin : 9.0 g/dL  Hematocrit : 28.4 %  Platelet Count - Automated : 261 K/uL  Mean Cell Volume : 91.0 fl  Mean Cell Hemoglobin : 28.8 pg  Mean Cell Hemoglobin Concentration : 31.7 gm/dL  Auto Neutrophil # : x  Auto Lymphocyte # : x  Auto Monocyte # : x  Auto Eosinophil # : x  Auto Basophil # : x  Auto Neutrophil % : x  Auto Lymphocyte % : x  Auto Monocyte % : x  Auto Eosinophil % : x  Auto Basophil % : x    09-21    136  |  102  |  35<H>  ----------------------------<  133<H>  3.9   |  26  |  1.28    Ca    8.5      21 Sep 2023 05:43  Phos  3.9     09-21  Mg     2.2     09-21    TPro  8.9<H>  /  Alb  2.4<L>  /  TBili  3.8<H>  /  DBili  2.6<H>  /  AST  87<H>  /  ALT  63<H>  /  AlkPhos  196<H>  09-20          Urinalysis Basic - ( 21 Sep 2023 05:43 )    Color: x / Appearance: x / SG: x / pH: x  Gluc: 133 mg/dL / Ketone: x  / Bili: x / Urobili: x   Blood: x / Protein: x / Nitrite: x   Leuk Esterase: x / RBC: x / WBC x   Sq Epi: x / Non Sq Epi: x / Bacteria: x              RADIOLOGY & ADDITIONAL STUDIES (The following images were personally reviewed):            A/p:78 M w/ Crohn's, AFib/Flutter s/p DCCVs on amiodarone, remote ileocectomy and open appendectomy. Admitted (6/23) for SBO vs Crohns flare, s/p NGT decompression and s/p lap TRE converted to open TRE, SBR x 3, left in discontinuity with abthera vac on (6/27), RTOR for ileocolic resection, small bowel anastomosis, and abdominal wall closure on (6/28), c/b fluid collection s/p IR aspiration of perihepatic fluid on (7/3), c/b wound dehiscence s/p RTOR exlap, washout, ileocolic resection with end ileostomy, blow hole colostomy, red rubber from ileostomy to small bowel anastomosis; vicryl bridging mesh on (7/5) transferred to SICU postoperatively for hemodynamic monitoring, with hospital course complicated by periods of AMS most recently on 9/1 and associated with oliguria, severe ELVI and hyponatremia, which resolved but now stepped back up for to SICU on 9/10 for acute AMS, intermittent hypoglycemia, AFib with RVR. Perc Choley tube placed on 9/11 for enlarged GB.     NEURO: AMS Resolved.  EEG neg, follow Neurology recs. Hx of depression - effexor Dc'd on 9/10. Pain control with Dilaudid for vac changes only. Cont Melatonin prn Insomnia, Cont Zofran ? Virtigo- resolved.    HENT: Cepacol  CV: HD stable but poss Hypovolemia will give 1L Bolus this am. Hx Afib/flutter: s/p DCCV, Off amiodarone given transaminitis; Continue Metoprolol 5q6. Hx HTN - Holding Norvasc, Losartan. Hx HLD: hold Lipitor given LFTs. TTE  (7/18) - PASP 64mmHg, EF 65-70%,   PULM: Saturating well on RA.   GI/FEN: CT scan showing Contrast not progressing past pyloris: Cont NPO, restart Imodium Holding Lomotil. Cont Octreotide in TPN for high ostomy and ECF output. PICC/TPN; Lipids @100G, Dex@27/kg, AA @1.6 transaminitis of unknown origin, Now stable s/p Perc Choley on 9/11. PPI.   : Voids, S/p ELVI now resolved.  Cr improved post Bolus yesterday will give another bolus today Nephrology Signed off   ENDO: mISS. Hx hypothyroid: IV Synthroid, TSH 2.180 (9.170), Recurrent Hypoglycemia on TPN- now resolved  ID: Numerous SICU admissions for sepsis. Currently off abx. Cont Nystatin powder  //Dc'd: Previously had C. tertium, Lactobacillis from his IR cx 7/3, and candida albicans, lactobacillus, vanc sensitive E faecium, vanc resistant E gallinarum, vanc resistant E casseliflavus, lactobacillus from his OR cx 7/5. Completed course of abx with Imipenem (9/10-9/15). Imipenem (8/26--) imipenem (6/30-7/12, 7/23-7/30), Dapto (6/30-7/5 and 7/23-7/24).  empiric dapto (8/23-24) and cefepime (8/23-24).   PPX: SCDs, SQH  LINES: PIVs, LUE PICC (9/1- )  WOUNDS/DRAINS: Abdominal wound and fistula with wet to dry dressing changes by nurse q 3hrs and prn, Rt LQ Ostomy. JOYCE.   PT: Ordered Out of bed to chair walk in halls.   DISPO: Possible Step down this week.  Interval Events:  No events overnight   Patient seen and examined at bedside.  No SOB or further vomiting    Allergies    penicillin (Angioedema)    Intolerances        Vital Signs Last 24 Hrs  T(C): 36.8 (21 Sep 2023 06:03), Max: 37.3 (20 Sep 2023 18:04)  T(F): 98.2 (21 Sep 2023 06:03), Max: 99.1 (20 Sep 2023 18:04)  HR: 82 (21 Sep 2023 06:00) (72 - 85)  BP: 129/62 (21 Sep 2023 06:00) (80/60 - 183/81)  BP(mean): 89 (21 Sep 2023 06:00) (79 - 117)  RR: 57 (21 Sep 2023 06:00) (11 - 57)  SpO2: 94% (21 Sep 2023 06:00) (94% - 98%)    Parameters below as of 21 Sep 2023 06:00  Patient On (Oxygen Delivery Method): room air        09-19 @ 07:01  -  09-20 @ 07:00  --------------------------------------------------------  IN: 3283.8 mL / OUT: 2545 mL / NET: 738.8 mL    09-20 @ 07:01 - 09-21 @ 06:26  --------------------------------------------------------  IN: 4322.8 mL / OUT: 1780 mL / NET: 2542.8 mL      09-19 @ 07:01 - 09-20 @ 07:00  --------------------------------------------------------  IN: 3283.8 mL / OUT: 2545 mL / NET: 738.8 mL    09-20 @ 07:01 - 09-21 @ 06:26  --------------------------------------------------------  IN: 4322.8 mL / OUT: 1780 mL / NET: 2542.8 mL        Physical Exam:     GENERAL: NAD, resting comfortably in a chair   NEURO: No focal deficits  HEENT: NCAT, Dry mouth  C/V: Normal rate, normal peripheral perfusion, no murmurs  PULM: Nonlabored breathing, no respiratory distress, CTA b/l  ABD: Soft, ND, NT, no rebound tenderness, no guarding, ostomy bag over blow hole with mccauley in place draining clear fluid. Open midline wound draining clear fluid.  JOYCE draining minimal ss fluid.   EXTREM: WWP, no edema, SCDs in place  MSK: No jt swelling noted.  Skin: No rashes noted  Psych: No signs of anxiety or depression       LABS:      CBC Full  -  ( 21 Sep 2023 05:43 )  WBC Count : 8.96 K/uL  RBC Count : 3.12 M/uL  Hemoglobin : 9.0 g/dL  Hematocrit : 28.4 %  Platelet Count - Automated : 261 K/uL  Mean Cell Volume : 91.0 fl  Mean Cell Hemoglobin : 28.8 pg  Mean Cell Hemoglobin Concentration : 31.7 gm/dL  Auto Neutrophil # : x  Auto Lymphocyte # : x  Auto Monocyte # : x  Auto Eosinophil # : x  Auto Basophil # : x  Auto Neutrophil % : x  Auto Lymphocyte % : x  Auto Monocyte % : x  Auto Eosinophil % : x  Auto Basophil % : x    09-21    136  |  102  |  35<H>  ----------------------------<  133<H>  3.9   |  26  |  1.28    Ca    8.5      21 Sep 2023 05:43  Phos  3.9     09-21  Mg     2.2     09-21    TPro  8.9<H>  /  Alb  2.4<L>  /  TBili  3.8<H>  /  DBili  2.6<H>  /  AST  87<H>  /  ALT  63<H>  /  AlkPhos  196<H>  09-20          Urinalysis Basic - ( 21 Sep 2023 05:43 )    Color: x / Appearance: x / SG: x / pH: x  Gluc: 133 mg/dL / Ketone: x  / Bili: x / Urobili: x   Blood: x / Protein: x / Nitrite: x   Leuk Esterase: x / RBC: x / WBC x   Sq Epi: x / Non Sq Epi: x / Bacteria: x              RADIOLOGY & ADDITIONAL STUDIES (The following images were personally reviewed):            A/p:78 M w/ Crohn's, AFib/Flutter s/p DCCVs on amiodarone, remote ileocectomy and open appendectomy. Admitted (6/23) for SBO vs Crohns flare, s/p NGT decompression and s/p lap TRE converted to open TRE, SBR x 3, left in discontinuity with abthera vac on (6/27), RTOR for ileocolic resection, small bowel anastomosis, and abdominal wall closure on (6/28), c/b fluid collection s/p IR aspiration of perihepatic fluid on (7/3), c/b wound dehiscence s/p RTOR exlap, washout, ileocolic resection with end ileostomy, blow hole colostomy, red rubber from ileostomy to small bowel anastomosis; vicryl bridging mesh on (7/5) transferred to SICU postoperatively for hemodynamic monitoring, with hospital course complicated by periods of AMS most recently on 9/1 and associated with oliguria, severe ELVI and hyponatremia, which resolved but now stepped back up for to SICU on 9/10 for acute AMS, intermittent hypoglycemia, AFib with RVR. Perc Choley tube placed on 9/11 for enlarged GB.     NEURO: AMS Resolved.  EEG neg, follow Neurology recs. Hx of depression - effexor Dc'd on 9/10. Pain control with Dilaudid for vac changes only. Cont Melatonin prn Insomnia, Cont Zofran ? Virtigo- resolved.    HENT: Cepacol  CV: HD stable but poss Hypovolemia will give 1L Bolus this am. Hx Afib/flutter: s/p DCCV, Off amiodarone given transaminitis; Continue Metoprolol 5q6. Hx HTN - Holding Norvasc, Losartan. Hx HLD: hold Lipitor given LFTs. TTE  (7/18) - PASP 64mmHg, EF 65-70%,   PULM: Saturating well on RA.   GI/FEN: CT scan showing Contrast not progressing past pyloris: Cont NPO, restart Imodium Holding Lomotil. Cont Octreotide in TPN for high ostomy and ECF output. PICC/TPN; Lipids @100G, Dex@27/kg, AA @1.6 transaminitis of unknown origin, Now stable s/p Perc Choley on 9/11. PPI.   : Voids, S/p ELVI now resolved.  Cr improved post Bolus yesterday will give another bolus today Nephrology Signed off   ENDO: mISS. Hx hypothyroid: IV Synthroid, TSH 2.180 (9.170), Recurrent Hypoglycemia on TPN- now resolved  ID: Numerous SICU admissions for sepsis. Currently off abx. Cont Nystatin powder  //Dc'd: Previously had C. tertium, Lactobacillis from his IR cx 7/3, and candida albicans, lactobacillus, vanc sensitive E faecium, vanc resistant E gallinarum, vanc resistant E casseliflavus, lactobacillus from his OR cx 7/5. Completed course of abx with Imipenem (9/10-9/15). Imipenem (8/26--) imipenem (6/30-7/12, 7/23-7/30), Dapto (6/30-7/5 and 7/23-7/24).  empiric dapto (8/23-24) and cefepime (8/23-24).   PPX: SCDs, SQH  LINES: PIVs, LUE PICC (9/1- )  WOUNDS/DRAINS: Abdominal wound and fistula with wet to dry dressing changes by nurse q 3hrs and prn, Rt LQ Ostomy. JOYCE.   PT: Ordered Out of bed to chair walk in halls.   DISPO: Possible Step down this week.

## 2023-09-21 NOTE — PROGRESS NOTE ADULT - SUBJECTIVE AND OBJECTIVE BOX
78 M w/ Crohn's, AFib/Flutter s/p DCCVs on amiodarone, remote ileocectomy and open appendectomy. Admitted (6/23) for SBO vs Crohns flare, s/p NGT decompression and s/p lap TRE converted to open TRE, SBR x 3, left in discontinuity with abthera vac on (6/27), RTOR for ileocolic resection, small bowel anastomosis, and abdominal wall closure on (6/28), c/b fluid collection s/p IR aspiration of perihepatic fluid on (7/3), c/b wound dehiscence s/p RTOR exlap, washout, ileocolic resection with end ileostomy, blow hole colostomy, red rubber from ileostomy to small bowel anastomosis; vicryl bridging mesh on (7/5) transferred to SICU postoperatively for hemodynamic monitoring, with hospital course complicated by periods of AMS most recently on 9/1 and associated with oliguria, severe ELVI and hyponatremia, which resolved but now stepped back up for to SICU on 9/10 for acute AMS, intermittent hypoglycemia, AFib with RVR. Started on precedex for agitation overnight. CT abd 9/10 showing distended gallbladder with sludge. SP IR percutaneous cholecystostomy placement 9/11/23.    Perc cony: 510cc bilious outpt/24 hr, day prior 595cc. Dressing cdi. Flushed easily with 3cc NS. Continue to monitor. IR will continue to follow.

## 2023-09-21 NOTE — ADVANCED PRACTICE NURSE CONSULT - ASSESSMENT
Abdominal wound bed with red, granulation tissue measuring 12 cm x 7 cm x 1.4 cm; fistula along edge of wound at 2 o'clock draining thin clear colored effluent. Slight peristomal fungal rash noted from 6 - 10 o'clock.   WOCN applied Nystatin powder and stoma powder to fungal rash and sealed with skin prep, applied Cavilon skin prep to periwound, section of VAC isolator along edge of fistula, ostomy rings, stoma paste and skin barrier along periwound, then an [facility] wound manager.   Supplies left at the bedside.  Abdominal wound bed with red, granulation tissue measuring 12 cm x 7 cm x 1.4 cm; fistula along edge of wound at 2 o'clock draining thin clear colored effluent. Slight peristomal fungal rash noted from 6 - 10 o'clock.   WOCN applied Nystatin powder and stoma powder to fungal rash and sealed with skin prep, applied Cavilon skin prep to periwound, section of VAC isolator along edge of fistula, ostomy rings, stoma paste and skin barrier along periwound, then an [facility] wound manager # [phone].   Supplies left at the bedside.

## 2023-09-21 NOTE — PROGRESS NOTE ADULT - NS ATTEND AMEND GEN_ALL_CORE FT
Crohn's, AF, SBR, ileocolic resection, end ileostomy  physical as above  trial of erythromycin for gastric emptying  continue TPN  restart antidiarrheal  continue metoprolol

## 2023-09-21 NOTE — PROGRESS NOTE ADULT - ASSESSMENT
78M w/ Crohn's, AFib/Flutter s/p DCCVs on amiodarone, remote ileocectomy and open appy here for SBO vs Crohns flare, s/p NGT decompression and now s/p lap TRE converted to open TRE, SBR x 3, left in discontinuity with abthera vac on 6/27, RTOR for ileocolic resection, small bowel anastomosis, and abdominal wall closure on 6/28, and s/p IR aspiration of perihepatic fluid on 7/3, stepped down to telemetry on 7/4. wound dehiscence on 7/5, RTOR ex-lap, washout, ileocolic resection with end ileostomy, blow hole colostomy, red rubber from ileostomy to small bowel anastomosis; Vicryl bridging mesh on 7/5. Transferred to SICU post op for HD monitoring. Extubated 7/6. Surgical wound with decreasing in size and granulating with Vac. Tentative plan for skin graft per Plastics; timing TBD pending proper wound shrinkage and granulation   8/23: Upgraded to SICU 8/23 for acute delirium and tremors, r/o sepsis vs metabolic encephalopathy. Ammonia levels normal    8/25: Spiked temp to 101.3 overnight w/ new leukocytosis to 14   8/28: Downgraded from SICU. No growth from blood cultures. Abx x 5 days until 8/29 8/30: Leukocytosis resolved   9/1: Decreased UOP, soft pressures. Cr 3.08 from 1.12. C/f dehydration 2/2 high fistula output vs ELVI   9/2: Upgraded to SICU for worsening ELVI. Stepped down 9/6   9/10: Upgraded to SICU for AMS, hypoglycemia and A-fib.   9/11: In setting of transaminitis, hyperbilirubinemia, leukocytosis and GB distention, underwent IR perc cony     Hemodynamically normal and afebrile. Low fistula output. Skin graft will not be feasible per plastics given frequent leakage from fistula into wound bed.   Slight drop in urine output with simultaneous increase in fistula and JOYCE drain output since restarting clears. Output from JOYCE and RUQ ileostomy clear    Nausea/emesis; CT A/P overnight pending read   CT A/P 9/20 unremarkable   AXR 9/20 showed contrast pooling in stomach c/f gastroparesis. Erythromycin given     Plan:   - NPO/IVF/TPN  - Continue wet-to-dry packing Q4 vs Q6 (see instructions in chart/event note on 9/18) - Wound care consult with Ms. Segura to see if EC fistula can be isolated for quantification of outputs   - Monitor perc cony, ostomy and JOYCE drain output  - Would recommend prn bolus for insensible losses from midline wound and increased Creatinine today  - PRN pain and nausea control   - Hx of A-fib/flutter; continue Lopressor   - DVT chemo & mechanical ppx   - OOB/PT  78M w/ Crohn's, AFib/Flutter s/p DCCVs on amiodarone, remote ileocectomy and open appy here for SBO vs Crohns flare, s/p NGT decompression and now s/p lap TRE converted to open TRE, SBR x 3, left in discontinuity with abthera vac on 6/27, RTOR for ileocolic resection, small bowel anastomosis, and abdominal wall closure on 6/28, and s/p IR aspiration of perihepatic fluid on 7/3, stepped down to telemetry on 7/4. wound dehiscence on 7/5, RTOR ex-lap, washout, ileocolic resection with end ileostomy, blow hole colostomy, red rubber from ileostomy to small bowel anastomosis; Vicryl bridging mesh on 7/5. Transferred to SICU post op for HD monitoring. Extubated 7/6. Surgical wound with decreasing in size and granulating with Vac. Tentative plan for skin graft per Plastics; timing TBD pending proper wound shrinkage and granulation   8/23: Upgraded to SICU 8/23 for acute delirium and tremors, r/o sepsis vs metabolic encephalopathy. Ammonia levels normal    8/25: Spiked temp to 101.3 overnight w/ new leukocytosis to 14   8/28: Downgraded from SICU. No growth from blood cultures. Abx x 5 days until 8/29 8/30: Leukocytosis resolved   9/1: Decreased UOP, soft pressures. Cr 3.08 from 1.12. C/f dehydration 2/2 high fistula output vs ELVI   9/2: Upgraded to SICU for worsening ELVI. Stepped down 9/6   9/10: Upgraded to SICU for AMS, hypoglycemia and A-fib.   9/11: In setting of transaminitis, hyperbilirubinemia, leukocytosis and GB distention, underwent IR perc cony     Hemodynamically normal and afebrile. Low fistula output. Skin graft will not be feasible per plastics given frequent leakage from fistula into wound bed.   Slight drop in urine output with simultaneous increase in fistula and JOYCE drain output since restarting clears, Creatinine today 1.28. Output from JOYCE and RUQ ileostomy clear    Nausea/emesis; CT A/P overnight pending read   CT A/P 9/20 unremarkable   AXR 9/20 showed contrast pooling in stomach c/f gastroparesis. Erythromycin given     Plan:   - NPO/IVF/TPN  - Continue wet-to-dry packing Q4 vs Q6 (see instructions in chart/event note on 9/18) - Wound care consult with Ms. Segura to see if EC fistula can be isolated for quantification of outputs   - Monitor perc cony, ostomy and JOYCE drain output  - Would recommend prn bolus for insensible losses from midline wound and increased Creatinine 9/20  - PRN pain and nausea control   - Hx of A-fib/flutter; continue Lopressor   - DVT chemo & mechanical ppx   - OOB/PT

## 2023-09-22 NOTE — PROGRESS NOTE ADULT - SUBJECTIVE AND OBJECTIVE BOX
SUBJECTIVE:   Patient seen and examined at bedside this AM   No acute overnight events noted. Wet-to-dry packing over midline switched to wound manager yesterday per wound care   Endorsing mild persistent nausea which resolves with emesis but denied emesis, chills or abdominal pain   Voiding spontaneously   Loose light green enteric content per RUQ ostomy       MEDICATIONS  (STANDING):  chlorhexidine 2% Cloths 1 Application(s) Topical <User Schedule>  dextrose 5%. 1000 milliLiter(s) (50 mL/Hr) IV Continuous <Continuous>  dextrose 5%. 1000 milliLiter(s) (100 mL/Hr) IV Continuous <Continuous>  dextrose 50% Injectable 25 Gram(s) IV Push once  dextrose 50% Injectable 12.5 Gram(s) IV Push once  dextrose 50% Injectable 25 Gram(s) IV Push once  erythromycin   IVPB 250 milliGRAM(s) IV Intermittent every 6 hours  glucagon  Injectable 1 milliGRAM(s) IntraMuscular once  heparin   Injectable 5000 Unit(s) SubCutaneous every 8 hours  insulin lispro (ADMELOG) corrective regimen sliding scale   SubCutaneous every 6 hours  levothyroxine Injectable 40 MICROGram(s) IV Push at bedtime  lipid, fat emulsion (Fish Oil and Plant Based) 20% Infusion 1 Gm/kG/Day (41.67 mL/Hr) IV Continuous <Continuous>  loperamide 4 milliGRAM(s) Oral every 6 hours  melatonin 5 milliGRAM(s) Oral at bedtime  metoprolol tartrate Injectable 5 milliGRAM(s) IV Push every 6 hours  nystatin Powder 1 Application(s) Topical three times a day  Parenteral Nutrition - Adult 1 Each (83 mL/Hr) TPN Continuous <Continuous>    MEDICATIONS  (PRN):  benzocaine/menthol Lozenge 2 Lozenge Oral every 4 hours PRN Sore Throat  dextrose Oral Gel 15 Gram(s) Oral once PRN Blood Glucose LESS THAN 70 milliGRAM(s)/deciliter  ondansetron Injectable 4 milliGRAM(s) IV Push every 8 hours PRN Nausea and/or Vomiting      Vital Signs Last 24 Hrs  T(C): 36.9 (22 Sep 2023 05:07), Max: 36.9 (22 Sep 2023 05:07)  T(F): 98.5 (22 Sep 2023 05:07), Max: 98.5 (22 Sep 2023 05:07)  HR: 82 (22 Sep 2023 05:00) (76 - 83)  BP: 144/68 (22 Sep 2023 05:00) (126/60 - 178/78)  BP(mean): 98 (22 Sep 2023 05:00) (87 - 120)  RR: 16 (22 Sep 2023 05:00) (14 - 26)  SpO2: 97% (22 Sep 2023 05:00) (94% - 99%)    Parameters below as of 22 Sep 2023 05:00  Patient On (Oxygen Delivery Method): room air        Physical Exam:  GENERAL: NAD, resting comfortably in bed  HEENT: NCAT, MMM  C/V: Normal rate, normal peripheral perfusion, no murmurs  PULM: Nonlabored breathing, no respiratory distress in room aur  ABD: Soft, ND, NT, no rebound tenderness, no guarding. Wet-to-dry dressing over wound clean with minimal stool staining. Wound bed clean-appearing. RUQ ileostomy with minimal output (95 cc/24 hr), perc cony with bilious output (730 cc/24 hr). JOYCE in LUQ with no output. Fistula/ostomy (145 cc/24 hr)  : Voiding spontaneously and adequately   EXTREM: WWP, no edema, SCDs in place  NEURO: No focal deficits    I&O's Summary    20 Sep 2023 07:01  -  21 Sep 2023 07:00  --------------------------------------------------------  IN: 4405.8 mL / OUT: 2200 mL / NET: 2205.8 mL    21 Sep 2023 07:01  -  22 Sep 2023 06:26  --------------------------------------------------------  IN: 4247 mL / OUT: 3795 mL / NET: 452 mL        LABS:                        9.0    8.96  )-----------( 261      ( 21 Sep 2023 05:43 )             28.4     09-21    136  |  102  |  35<H>  ----------------------------<  133<H>  3.9   |  26  |  1.28    Ca    8.5      21 Sep 2023 05:43  Phos  3.9     09-21  Mg     2.2     09-21    TPro  8.9<H>  /  Alb  2.4<L>  /  TBili  3.8<H>  /  DBili  2.6<H>  /  AST  87<H>  /  ALT  63<H>  /  AlkPhos  196<H>  09-20      Urinalysis Basic - ( 21 Sep 2023 05:43 )    Color: x / Appearance: x / SG: x / pH: x  Gluc: 133 mg/dL / Ketone: x  / Bili: x / Urobili: x   Blood: x / Protein: x / Nitrite: x   Leuk Esterase: x / RBC: x / WBC x   Sq Epi: x / Non Sq Epi: x / Bacteria: x      CAPILLARY BLOOD GLUCOSE      POCT Blood Glucose.: 112 mg/dL (21 Sep 2023 23:02)  POCT Blood Glucose.: 101 mg/dL (21 Sep 2023 16:42)  POCT Blood Glucose.: 108 mg/dL (21 Sep 2023 12:08)    LIVER FUNCTIONS - ( 20 Sep 2023 06:27 )  Alb: 2.4 g/dL / Pro: 8.9 g/dL / ALK PHOS: 196 U/L / ALT: 63 U/L / AST: 87 U/L / GGT: x             RADIOLOGY & ADDITIONAL STUDIES:

## 2023-09-22 NOTE — PROGRESS NOTE ADULT - NS ATTEND AMEND GEN_ALL_CORE FT
Crohn's, AF, SBR, ileocolic resection with end ileostomy, enteroatmospheric fistula  physical as above  Nausea may be improved on the erythromycin  continue TPN  loperamide and immodium added back on  add norvasc for HTN  continue metoprolol

## 2023-09-22 NOTE — PROGRESS NOTE ADULT - SUBJECTIVE AND OBJECTIVE BOX
78 M w/ Crohn's, AFib/Flutter s/p DCCVs on amiodarone, remote ileocectomy and open appendectomy. Admitted (6/23) for SBO vs Crohns flare, s/p NGT decompression and s/p lap TRE converted to open TRE, SBR x 3, left in discontinuity with abthera vac on (6/27), RTOR for ileocolic resection, small bowel anastomosis, and abdominal wall closure on (6/28), c/b fluid collection s/p IR aspiration of perihepatic fluid on (7/3), c/b wound dehiscence s/p RTOR exlap, washout, ileocolic resection with end ileostomy, blow hole colostomy, red rubber from ileostomy to small bowel anastomosis; vicryl bridging mesh on (7/5) transferred to SICU postoperatively for hemodynamic monitoring, with hospital course complicated by periods of AMS most recently on 9/1 and associated with oliguria, severe ELVI and hyponatremia, which resolved but now stepped back up for to SICU on 9/10 for acute AMS, intermittent hypoglycemia, AFib with RVR. Started on precedex for agitation overnight. CT abd 9/10 showing distended gallbladder with sludge. SP IR percutaneous cholecystostomy placement 9/11/23.    Perc cony: 730cc bilious outpt/24 hr, day prior 510cc. Dressing cdi. Flushed easily with 3cc NS. Continue to monitor. IR will continue to follow.

## 2023-09-22 NOTE — PROGRESS NOTE ADULT - ASSESSMENT
78M w/ Crohn's, AFib/Flutter s/p DCCVs on amiodarone, remote ileocectomy and open appy here for SBO vs Crohns flare, s/p NGT decompression and now s/p lap TRE converted to open TRE, SBR x 3, left in discontinuity with abthera vac on 6/27, RTOR for ileocolic resection, small bowel anastomosis, and abdominal wall closure on 6/28, and s/p IR aspiration of perihepatic fluid on 7/3, stepped down to telemetry on 7/4. wound dehiscence on 7/5, RTOR ex-lap, washout, ileocolic resection with end ileostomy, blow hole colostomy, red rubber from ileostomy to small bowel anastomosis; Vicryl bridging mesh on 7/5. Transferred to SICU post op for HD monitoring. Extubated 7/6. Surgical wound with decreasing in size and granulating with wound manager.     Plan:     - NPO/IVF/TPN   - Monitor perc cony, ostomy and JOYCE drain output  - Wound care nursing with surgery team management of wound, will attempt EC fistula isolation   - PRN pain and nausea control   - Hx of A-fib/flutter; continue Lopressor   - DVT chemo & mechanical ppx   - OOB/PT

## 2023-09-22 NOTE — PROGRESS NOTE ADULT - ASSESSMENT
78M w/ Crohn's, AFib/Flutter s/p DCCVs on amiodarone, remote ileocectomy and open appy here for SBO vs Crohns flare, s/p NGT decompression and now s/p lap TRE converted to open TRE, SBR x 3, left in discontinuity with abthera vac on 6/27, RTOR for ileocolic resection, small bowel anastomosis, and abdominal wall closure on 6/28, and s/p IR aspiration of perihepatic fluid on 7/3, stepped down to telemetry on 7/4. wound dehiscence on 7/5, RTOR ex-lap, washout, ileocolic resection with end ileostomy, blow hole colostomy, red rubber from ileostomy to small bowel anastomosis; Vicryl bridging mesh on 7/5. Transferred to SICU post op for HD monitoring. Extubated 7/6. Surgical wound with decreasing in size and granulating with Vac. Tentative plan for skin graft per Plastics; timing TBD pending proper wound shrinkage and granulation   8/23: Upgraded to SICU 8/23 for acute delirium and tremors, r/o sepsis vs metabolic encephalopathy. Ammonia levels normal    8/25: Spiked temp to 101.3 overnight w/ new leukocytosis to 14   8/28: Downgraded from SICU. No growth from blood cultures. Abx x 5 days until 8/29 8/30: Leukocytosis resolved   9/1: Decreased UOP, soft pressures. Cr 3.08 from 1.12. C/f dehydration 2/2 high fistula output vs ELVI   9/2: Upgraded to SICU for worsening ELVI. Stepped down 9/6   9/10: Upgraded to SICU for AMS, hypoglycemia and A-fib.   9/11: In setting of transaminitis, hyperbilirubinemia, leukocytosis and GB distention, underwent IR perc cony     Hemodynamically normal and afebrile. Low fistula output. Skin graft will not be feasible per plastics given frequent leakage from fistula into wound bed.   Slight drop in urine output with simultaneous increase in fistula and JOYCE drain output since restarting clears. Output from JOYCE and RUQ ileostomy clear    Nausea/emesis; CT A/P overnight pending read   CT A/P 9/20 unremarkable   AXR 9/20 showed contrast pooling in stomach c/f gastroparesis. Erythromycin given     Plan:   - Repeat AXR   - NPO/IVF/TPN   - Monitor perc cony, ostomy and JOYCE drain output  - PRN pain and nausea control   - Hx of A-fib/flutter; continue Lopressor   - DVT chemo & mechanical ppx   - OOB/PT     D/w SICU, chief resident and attending  78M w/ Crohn's, AFib/Flutter s/p DCCVs on amiodarone, remote ileocectomy and open appy here for SBO vs Crohns flare, s/p NGT decompression and now s/p lap TRE converted to open TRE, SBR x 3, left in discontinuity with abthera vac on 6/27, RTOR for ileocolic resection, small bowel anastomosis, and abdominal wall closure on 6/28, and s/p IR aspiration of perihepatic fluid on 7/3, stepped down to telemetry on 7/4. wound dehiscence on 7/5, RTOR ex-lap, washout, ileocolic resection with end ileostomy, blow hole colostomy, red rubber from ileostomy to small bowel anastomosis; Vicryl bridging mesh on 7/5. Transferred to SICU post op for HD monitoring. Extubated 7/6. Surgical wound with decreasing in size and granulating with Vac. Tentative plan for skin graft per Plastics; timing TBD pending proper wound shrinkage and granulation   8/23: Upgraded to SICU 8/23 for acute delirium and tremors, r/o sepsis vs metabolic encephalopathy. Ammonia levels normal    8/25: Spiked temp to 101.3 overnight w/ new leukocytosis to 14   8/28: Downgraded from SICU. No growth from blood cultures. Abx x 5 days until 8/29 8/30: Leukocytosis resolved   9/1: Decreased UOP, soft pressures. Cr 3.08 from 1.12. C/f dehydration 2/2 high fistula output vs ELVI   9/2: Upgraded to SICU for worsening ELVI. Stepped down 9/6   9/10: Upgraded to SICU for AMS, hypoglycemia and A-fib.   9/11: In setting of transaminitis, hyperbilirubinemia, leukocytosis and GB distention, underwent IR perc cony     Hemodynamically normal and afebrile. Low fistula output. Skin graft will not be feasible per plastics given frequent leakage from fistula into wound bed.   Slight drop in urine output with simultaneous increase in fistula and JOYCE drain output since restarting clears. Output from JOYCE and RUQ ileostomy clear    Nausea/emesis; CT A/P overnight pending read   CT A/P 9/20 unremarkable   AXR 9/20 showed contrast pooling in stomach c/f gastroparesis. Persistent nausea likely secondary to gastroparesis. On Erythromycin     Plan:   - Continue Loperamide and Octreotide   - Continue Erythromycin   - Appreciate wound care management   - NPO/IVF/TPN   - Monitor perc cony, ostomy and JOYCE drain output  - PRN pain and nausea control   - Hx of A-fib/flutter; continue Lopressor   - DVT chemo & mechanical ppx   - OOB/PT     D/w SICU, chief resident and attending

## 2023-09-22 NOTE — PROGRESS NOTE ADULT - SUBJECTIVE AND OBJECTIVE BOX
SUBJECTIVE:    Patient seen and examined at bedside this AM   No acute overnight events noted. Wet-to-dry packing over midline switched to wound manager yesterday per wound care   Endorsing mild persistent nausea which resolves with emesis but denied emesis, chills or abdominal pain   Voiding spontaneously   Loose light green enteric content per RUQ ostomy     MEDICATIONS  (STANDING):  chlorhexidine 2% Cloths 1 Application(s) Topical <User Schedule>  dextrose 5%. 1000 milliLiter(s) (50 mL/Hr) IV Continuous <Continuous>  dextrose 5%. 1000 milliLiter(s) (100 mL/Hr) IV Continuous <Continuous>  dextrose 50% Injectable 25 Gram(s) IV Push once  dextrose 50% Injectable 12.5 Gram(s) IV Push once  dextrose 50% Injectable 25 Gram(s) IV Push once  erythromycin   IVPB 250 milliGRAM(s) IV Intermittent every 6 hours  glucagon  Injectable 1 milliGRAM(s) IntraMuscular once  heparin   Injectable 5000 Unit(s) SubCutaneous every 8 hours  insulin lispro (ADMELOG) corrective regimen sliding scale   SubCutaneous every 6 hours  levothyroxine Injectable 40 MICROGram(s) IV Push at bedtime  lipid, fat emulsion (Fish Oil and Plant Based) 20% Infusion 1 Gm/kG/Day (41.67 mL/Hr) IV Continuous <Continuous>  lipid, fat emulsion (Fish Oil and Plant Based) 20% Infusion 1 Gm/kG/Day (41.67 mL/Hr) IV Continuous <Continuous>  loperamide 4 milliGRAM(s) Oral every 6 hours  melatonin 5 milliGRAM(s) Oral at bedtime  metoprolol tartrate Injectable 5 milliGRAM(s) IV Push every 6 hours  nystatin Powder 1 Application(s) Topical three times a day  Parenteral Nutrition - Adult 1 Each (83 mL/Hr) TPN Continuous <Continuous>  Parenteral Nutrition - Adult 1 Each (83 mL/Hr) TPN Continuous <Continuous>    MEDICATIONS  (PRN):  benzocaine/menthol Lozenge 2 Lozenge Oral every 4 hours PRN Sore Throat  dextrose Oral Gel 15 Gram(s) Oral once PRN Blood Glucose LESS THAN 70 milliGRAM(s)/deciliter  ondansetron Injectable 4 milliGRAM(s) IV Push every 8 hours PRN Nausea and/or Vomiting      Vital Signs Last 24 Hrs  T(C): 36.9 (22 Sep 2023 05:07), Max: 36.9 (22 Sep 2023 05:07)  T(F): 98.5 (22 Sep 2023 05:07), Max: 98.5 (22 Sep 2023 05:07)  HR: 80 (22 Sep 2023 07:00) (76 - 82)  BP: 169/72 (22 Sep 2023 07:00) (134/96 - 178/78)  BP(mean): 103 (22 Sep 2023 07:00) (91 - 120)  RR: 23 (22 Sep 2023 07:00) (14 - 26)  SpO2: 96% (22 Sep 2023 07:00) (94% - 99%)    Parameters below as of 22 Sep 2023 07:00  Patient On (Oxygen Delivery Method): room air    Physical Exam:  GENERAL: NAD, resting comfortably in bed  HEENT: NCAT, MMM  C/V: Normal rate, normal peripheral perfusion, no murmurs  PULM: Nonlabored breathing, no respiratory distress in room aur  ABD: Soft, ND, NT, no rebound tenderness, no guarding. Wet-to-dry dressing over wound clean with minimal stool staining. Wound bed clean-appearing. RUQ ileostomy with minimal output (95 cc/24 hr), perc cony with bilious output (730 cc/24 hr). JOYCE in LUQ with no output. Fistula/ostomy (145 cc/24 hr)  : Voiding spontaneously and adequately   EXTREM: WWP, no edema, SCDs in place  NEURO: No focal deficits    I&O's Summary    21 Sep 2023 07:01  -  22 Sep 2023 07:00  --------------------------------------------------------  IN: 4330 mL / OUT: 3795 mL / NET: 535 mL    LABS:                        9.0    8.96  )-----------( 261      ( 21 Sep 2023 05:43 )             28.4     09-21    136  |  102  |  35<H>  ----------------------------<  133<H>  3.9   |  26  |  1.28    Ca    8.5      21 Sep 2023 05:43  Phos  3.9     09-21  Mg     2.2     09-21        Urinalysis Basic - ( 21 Sep 2023 05:43 )    Color: x / Appearance: x / SG: x / pH: x  Gluc: 133 mg/dL / Ketone: x  / Bili: x / Urobili: x   Blood: x / Protein: x / Nitrite: x   Leuk Esterase: x / RBC: x / WBC x   Sq Epi: x / Non Sq Epi: x / Bacteria: x      CAPILLARY BLOOD GLUCOSE      POCT Blood Glucose.: 121 mg/dL (22 Sep 2023 06:52)  POCT Blood Glucose.: 112 mg/dL (21 Sep 2023 23:02)  POCT Blood Glucose.: 101 mg/dL (21 Sep 2023 16:42)  POCT Blood Glucose.: 108 mg/dL (21 Sep 2023 12:08)        RADIOLOGY & ADDITIONAL STUDIES:

## 2023-09-22 NOTE — CHART NOTE - NSCHARTNOTEFT_GEN_A_CORE
Admitting Diagnosis:   Patient is a 78y old  Male who presents with a chief complaint of Crohn's (21 Sep 2023 06:25)      PAST MEDICAL & SURGICAL HISTORY:  Essential hypertension  Hypertension      Atrial fibrillation  s/p cardioversion 2010 and 2014  Pt. reports 4 DCCV  Now on Amiodarone 200mg PO bid and Eliquis 5mg PO bid  Last DCCV 4 yrs ago at       Crohn's disease  s/p partial resection of ileum      Hyperlipidemia      Hypothyroidism      History of depression  On Venlafaxine ER 150mg PO bid      Junctional rhythm      H/O knee surgery      History of cataract surgery          Current Nutrition Order: NPO; TPN via PICC- 365g Dex, 125g AA, 100g SMOF lipids [2741 kcals, 125g protein, GIR 2.80 mg/kg/min]    PO Intake: Good (%) [   ]  Fair (50-75%) [   ] Poor (<25%) [   ]- N/A    GI Issues: +nausea yesterday, LUQ JOYCE drain [output of 30cc x 24hrs], perc cony drain with bilious output [390cc x 24hrs] , ileostomy with minimal output [80cc x 24hrs]    Pain: no pain/discomfort noted    Skin Integrity: R heel DTI, LUQ JOYCE, mid abd wound, EC fistula, ileostomy, L forearm skin tear. Rudy score 18    Labs:   09-21    136  |  102  |  35<H>  ----------------------------<  133<H>  3.9   |  26  |  1.28    Ca    8.5      21 Sep 2023 05:43  Phos  3.9     09-21  Mg     2.2     09-21      CAPILLARY BLOOD GLUCOSE      POCT Blood Glucose.: 132 mg/dL (22 Sep 2023 11:20)  POCT Blood Glucose.: 121 mg/dL (22 Sep 2023 06:52)  POCT Blood Glucose.: 112 mg/dL (21 Sep 2023 23:02)  POCT Blood Glucose.: 101 mg/dL (21 Sep 2023 16:42)      Medications:  MEDICATIONS  (STANDING):  amLODIPine   Tablet 5 milliGRAM(s) Oral daily  chlorhexidine 2% Cloths 1 Application(s) Topical <User Schedule>  dextrose 5%. 1000 milliLiter(s) (50 mL/Hr) IV Continuous <Continuous>  dextrose 5%. 1000 milliLiter(s) (100 mL/Hr) IV Continuous <Continuous>  dextrose 50% Injectable 25 Gram(s) IV Push once  dextrose 50% Injectable 12.5 Gram(s) IV Push once  dextrose 50% Injectable 25 Gram(s) IV Push once  erythromycin   IVPB 250 milliGRAM(s) IV Intermittent every 6 hours  glucagon  Injectable 1 milliGRAM(s) IntraMuscular once  heparin   Injectable 5000 Unit(s) SubCutaneous every 8 hours  insulin lispro (ADMELOG) corrective regimen sliding scale   SubCutaneous every 6 hours  levothyroxine Injectable 40 MICROGram(s) IV Push at bedtime  lipid, fat emulsion (Fish Oil and Plant Based) 20% Infusion 1 Gm/kG/Day (41.67 mL/Hr) IV Continuous <Continuous>  loperamide 4 milliGRAM(s) Oral every 6 hours  melatonin 5 milliGRAM(s) Oral at bedtime  metoprolol tartrate Injectable 5 milliGRAM(s) IV Push every 6 hours  nystatin Powder 1 Application(s) Topical three times a day  Parenteral Nutrition - Adult 1 Each (83 mL/Hr) TPN Continuous <Continuous>  Parenteral Nutrition - Adult 1 Each (83 mL/Hr) TPN Continuous <Continuous>    MEDICATIONS  (PRN):  benzocaine/menthol Lozenge 2 Lozenge Oral every 4 hours PRN Sore Throat  dextrose Oral Gel 15 Gram(s) Oral once PRN Blood Glucose LESS THAN 70 milliGRAM(s)/deciliter  ondansetron Injectable 4 milliGRAM(s) IV Push every 8 hours PRN Nausea and/or Vomiting      Weight: 90.4kg [21 Sep 2023]  Daily 91.4kg [20 Sep 2023]  Daily 86.7kg [18 Sep 2023]  Daily 88.1kg [16 Sep 2023]  Daily 88.9kg [15 Sep 2023]   Daily 89.1 [14 Sep 2023]  Daily 92.9kg [6 Sep 2023]  Daily 91.8kg [27 Aug 2023]  Daily 101kg [9 Aug 2023]  Daily   102.1kg [10 July 2023]  Daily 99.9kg [9 July 2023]    Weight Change: weight trending down throughout admission. Recommend continue weekly weights for trending / ensuring adequacy of nutrition provision    IBW: 86.4kg   % IBW: 100.3%    Estimated energy needs:   Ideal body weight (86.4kg) used for calculations as pt >100% of IBW, BMI <30 per [facility] Standards of Care. Needs estimated for age and adjusted for current clinical status, increased needs for post-op & open abd wound healing    Calories: 1874-8917 kcals based on 30-35 kcals/kg  Protein: 172.8-216 g protein based on 2.0-2.5g protein/kg  *Fluid needs per team    Subjective:   78M w/ Crohn's, AFib/Flutter s/p DCCVs on amiodarone, remote ileocectomy and open appy here for SBO vs Crohns flare, s/p NGT decompression and now s/p lap TRE converted to open TRE, SBR x 3, left in discontinuity with abthera vac on 6/27, RTOR for ileocolic resection, small bowel anastomosis, and abdominal wall closure on 6/28, and s/p IR aspiration of perihepatic fluid on 7/3, stepped down to telemetry on 7/4. wound dehiscence on 7/5, RTOR ex-lap, washout, ileocolic resection with end ileostomy, blow hole colostomy, red rubber from ileostomy to small bowel anastomosis; Vicryl bridging mesh on 7/5. Transferred to SICU post op for HD monitoring. Extubated 7/6 and SDU 8/2. Has been recovering on telemetry with ECF management and prolonged TPN. Upgraded to SICU 8/23 for acute delirium and tremors, r/o sepsis vs metabolic encephalopathy. Ammonia levels normal. TPN DC'ed and started on PO diet. Stepped down to 8 [place] on 8/28.     Chart reviewed. Pt seen at bedside on 5 EA with IDT today during AM rounds, on room air. TPN remains primary means to nutrition- provision adjusted previously due to elevated in BUN & high urine urea. Noted with nausea/emesis yesterday, sips & chips d/c, now strict NPO- reported improvement in nausea today. Loose light green enteric content per RUQ ostomy. Pt later requesting to speak to RDN regarding further plans for nutrition. Education provided on current nutrition prescription & future recommendations for weaning from TPN should PO diet become feasible & tolerated. Labs reviewed: POC BG trending  mg/dL x 3 days. Total bili & direct bili remain elevated, ALT & AST elevated.  [9/18]. Meds- on zofran prn, imodium. RDN will continue to monitor, reassess, and intervene as appropriate.     Previous Nutrition Diagnosis: Increased Nutrient Needs R/T physiological demands for nutrient AEB post-op wound healing    Active [ X  ]  Resolved [   ]    If resolved, new PES:     Goal:  Pt will meet at least 75% of protein & energy needs via most appropriate route for nutrition     Recommendations:  1. TPN via PICC-  Recommend 365g Dex, 125g AA, 100g SMOF lipids; provides 2741 kcals, 125g protein, GIR 2.80 mg/kg/min; provides 31.7 kcals/kg, 1.45g protein/kg, 25.9 non-protein kcals/kg IBW  - c/w MVI, B1, Vit C, Zinc in TPN bag; hold copper & manganese  - monitor resp status & renal labs, adjust provision prn  - closely monitor feasibility for resuming PO  2. Fluids & lytes per team  3. Weekly lipid panel  - hold lipids if TG >400  4. Daily BMP, Mg, Phos. POC BG 4-6hrs  - monitor & replenish lytes outside TPN bag  5. Monitor chemistry, GI function, skin integrity  6. To continue close monitoring of clinical course & adjust recommendations prn    Education: education on current nutrition provision & monitoring provided    Risk Level: High [ X  ] Moderate [   ] Low [   ]

## 2023-09-22 NOTE — PROGRESS NOTE ADULT - ASSESSMENT
78 M w/ Crohn's, AFib/Flutter s/p DCCVs on amiodarone, remote ileocectomy and open appendectomy. Admitted (6/23) for SBO vs Crohns flare, s/p NGT decompression and s/p lap TRE converted to open TRE, SBR x 3, left in discontinuity with abthera vac on (6/27), RTOR for ileocolic resection, small bowel anastomosis, and abdominal wall closure on (6/28), c/b fluid collection s/p IR aspiration of perihepatic fluid on (7/3), c/b wound dehiscence s/p RTOR exlap, washout, ileocolic resection with end ileostomy, blow hole colostomy, red rubber from ileostomy to small bowel anastomosis; vicryl bridging mesh on (7/5) transferred to SICU postoperatively for hemodynamic monitoring, with hospital course complicated by periods of AMS most recently on 9/1 and associated with oliguria, severe ELVI and hyponatremia, which resolved but now stepped back up for to SICU on 9/10 for acute AMS, intermittent hypoglycemia, AFib with RVR. Perc Choley tube placed on 9/11 for enlarged GB.     NEURO: AMS Resolved.  EEG neg, follow Neurology recs. Hx of depression - effexor Dc'd on 9/10. Pain control with Dilaudid for vac changes only. Cont Melatonin prn Insomnia, Cont Zofran ? Virtigo- resolved.    HENT: Cepacol  CV: HD stable but poss Hypovolemia will give 1L Bolus this am. Hx Afib/flutter: s/p DCCV, Off amiodarone given transaminitis; Continue Metoprolol 5q6. Hx HTN - Holding Norvasc, Losartan. Hx HLD: hold Lipitor given LFTs. TTE  (7/18) - PASP 64mmHg, EF 65-70%,   PULM: Saturating well on RA.   GI/FEN: CT scan showing Contrast not progressing past pyloris: Cont NPO, restart Imodium Holding Lomotil. Cont Octreotide in TPN for high ostomy and ECF output. PICC/TPN; Lipids @100G, Dex@27/kg, AA @1.6 transaminitis of unknown origin, Now stable s/p Perc Choley on 9/11. PPI.   : Voids, S/p ELVI now resolved.  Cr improved post Bolus yesterday will give another bolus today Nephrology Signed off   ENDO: mISS. Hx hypothyroid: IV Synthroid, TSH 2.180 (9.170), Recurrent Hypoglycemia on TPN- now resolved  ID: Numerous SICU admissions for sepsis. Currently off abx. Cont Nystatin powder  //Dc'd: Previously had C. tertium, Lactobacillis from his IR cx 7/3, and candida albicans, lactobacillus, vanc sensitive E faecium, vanc resistant E gallinarum, vanc resistant E casseliflavus, lactobacillus from his OR cx 7/5. Completed course of abx with Imipenem (9/10-9/15). Imipenem (8/26--) imipenem (6/30-7/12, 7/23-7/30), Dapto (6/30-7/5 and 7/23-7/24).  empiric dapto (8/23-24) and cefepime (8/23-24).   PPX: SCDs, SQH  LINES: PIVs, LUE PICC (9/1- )  WOUNDS/DRAINS: Abdominal wound and fistula with wet to dry dressing changes by nurse q 3hrs and prn, Rt LQ Ostomy. JOYCE.   PT: Ordered Out of bed to chair walk in halls.   DISPO: Possible Step down this week.    78 M w/ Crohn's, AFib/Flutter s/p DCCVs on amiodarone, remote ileocectomy and open appendectomy. Admitted (6/23) for SBO vs Crohns flare, s/p NGT decompression and s/p lap TRE converted to open TRE, SBR x 3, left in discontinuity with abthera vac on (6/27), RTOR for ileocolic resection, small bowel anastomosis, and abdominal wall closure on (6/28), c/b fluid collection s/p IR aspiration of perihepatic fluid on (7/3), c/b wound dehiscence s/p RTOR exlap, washout, ileocolic resection with end ileostomy, blow hole colostomy, red rubber from ileostomy to small bowel anastomosis; vicryl bridging mesh on (7/5) transferred to SICU postoperatively for hemodynamic monitoring, with hospital course complicated by periods of AMS most recently on 9/1 and associated with oliguria, severe ELVI and hyponatremia, which resolved but now stepped back up for to SICU on 9/10 for acute AMS, intermittent hypoglycemia, AFib with RVR. Perc Choley tube placed on 9/11 for enlarged GB.     NEURO: AMS Resolved.  EEG neg, follow Neurology recs. Hx of depression - effexor Dc'd on 9/10. Pain control with Dilaudid for vac changes only. Cont Melatonin prn Insomnia, Cont Zofran   HENT: Cepacol  CV: Hx Afib/flutter: s/p DCCV, Off amiodarone given transaminitis; Continue Metoprolol 5q6. Hx HTN - Holding Losartan. Hx HLD: hold Lipitor given LFTs. TTE  (7/18) - PASP 64mmHg, EF 65-70%, BP borderline high - restart norvasc 5qd.   PULM: no resp distress on room air.   GI/FEN: CT scan showing Contrast not progressing past pyloris, suspect delayed gastric emptying. Cont NPO, cont Imodium Holding Lomotil. Cont Octreotide in TPN for high ostomy and ECF output. PICC/TPN; Lipids @100G, Dex@27/kg, AA @1.6 transaminitis of unknown origin, Now stable s/p Perc Choley on 9/11. PPI.   : Voids, S/p ELVI resolved  ENDO: mISS. Hx hypothyroid: IV Synthroid, TSH 2.180 (9.170),   ID: Numerous SICU admissions for sepsis. Currently off abx. Cont Nystatin powder   /// PREVIOUSLY had C. tertium, Lactobacillis from his IR cx 7/3, and candida albicans, lactobacillus, vanc sensitive E faecium, vanc resistant E gallinarum, vanc resistant E casseliflavus, lactobacillus from his OR cx 7/5. Completed course of abx with Imipenem (9/10-9/15). Imipenem (8/26--) imipenem (6/30-7/12, 7/23-7/30), Dapto (6/30-7/5 and 7/23-7/24).  empiric dapto (8/23-24) and cefepime (8/23-24).   PPX: SCDs, SQH  LINES: PIVs, LUE PICC (9/1- )  WOUNDS/DRAINS: Abdominal wound and fistula with wet to dry dressing changes by nurse q 3hrs and prn, Rt LQ Ostomy. JOYCE.   PT: Ordered Out of bed to chair walk in halls.

## 2023-09-22 NOTE — PROGRESS NOTE ADULT - SUBJECTIVE AND OBJECTIVE BOX
S: No new issues/events overnight, no new med c/o    O: ICU Vital Signs Last 24 Hrs  T(F): 98.5 (09-22-23 @ 05:07), Max: 98.5 (09-22-23 @ 05:07)  HR: 79 (09-22-23 @ 09:00) (76 - 82)  BP: 162/67 (09-22-23 @ 09:00) (134/96 - 194/88)  BP(mean): 97 (09-22-23 @ 09:00) (91 - 126)  ABP: --  RR: 22 (09-22-23 @ 09:00) (14 - 26)  SpO2: 98% (09-22-23 @ 09:00) (94% - 99%)    PHYSICAL EXAM:   Neurological: AAOx3, CNII-XII intact,  strength 5/5 b/l  ENT: mucus membrane moist  Cardiovascular: RRR  Respiratory: CTA  Gastrointestinal: soft, NT, ND, BS+  Extremities: warm, no dependent edema  Vascular: no cyanosis/erythema  Skin: no rashes  MSK: no joint swelling.     LABS:    09-21    136  |  102  |  35<H>  ----------------------------<  133<H>  3.9   |  26  |  1.28    Ca    8.5      21 Sep 2023 05:43  Phos  3.9     09-21  Mg     2.2     09-21                          9.0    8.96  )-----------( 261      ( 21 Sep 2023 05:43 )             28.4     Urinalysis Basic - ( 21 Sep 2023 05:43 )    Color: x / Appearance: x / SG: x / pH: x  Gluc: 133 mg/dL / Ketone: x  / Bili: x / Urobili: x   Blood: x / Protein: x / Nitrite: x   Leuk Esterase: x / RBC: x / WBC x   Sq Epi: x / Non Sq Epi: x / Bacteria: x    CAPILLARY BLOOD GLUCOSE      POCT Blood Glucose.: 121 mg/dL (22 Sep 2023 06:52)  POCT Blood Glucose.: 112 mg/dL (21 Sep 2023 23:02)  POCT Blood Glucose.: 101 mg/dL (21 Sep 2023 16:42)  POCT Blood Glucose.: 108 mg/dL (21 Sep 2023 12:08)    MEDICATIONS  (STANDING):  chlorhexidine 2% Cloths 1 Application(s) Topical <User Schedule>  dextrose 5%. 1000 milliLiter(s) (50 mL/Hr) IV Continuous <Continuous>  dextrose 5%. 1000 milliLiter(s) (100 mL/Hr) IV Continuous <Continuous>  dextrose 50% Injectable 25 Gram(s) IV Push once  dextrose 50% Injectable 12.5 Gram(s) IV Push once  dextrose 50% Injectable 25 Gram(s) IV Push once  erythromycin   IVPB 250 milliGRAM(s) IV Intermittent every 6 hours  glucagon  Injectable 1 milliGRAM(s) IntraMuscular once  heparin   Injectable 5000 Unit(s) SubCutaneous every 8 hours  insulin lispro (ADMELOG) corrective regimen sliding scale   SubCutaneous every 6 hours  levothyroxine Injectable 40 MICROGram(s) IV Push at bedtime  lipid, fat emulsion (Fish Oil and Plant Based) 20% Infusion 1 Gm/kG/Day (41.67 mL/Hr) IV Continuous <Continuous>  lipid, fat emulsion (Fish Oil and Plant Based) 20% Infusion 1 Gm/kG/Day (41.67 mL/Hr) IV Continuous <Continuous>  loperamide 4 milliGRAM(s) Oral every 6 hours  melatonin 5 milliGRAM(s) Oral at bedtime  metoprolol tartrate Injectable 5 milliGRAM(s) IV Push every 6 hours  nystatin Powder 1 Application(s) Topical three times a day  Parenteral Nutrition - Adult 1 Each (83 mL/Hr) TPN Continuous <Continuous>  Parenteral Nutrition - Adult 1 Each (83 mL/Hr) TPN Continuous <Continuous>    MEDICATIONS  (PRN):  benzocaine/menthol Lozenge 2 Lozenge Oral every 4 hours PRN Sore Throat  dextrose Oral Gel 15 Gram(s) Oral once PRN Blood Glucose LESS THAN 70 milliGRAM(s)/deciliter  ondansetron Injectable 4 milliGRAM(s) IV Push every 8 hours PRN Nausea and/or Vomiting      Poloe:	  [ ] None	[ ] Daily Pooel Order Placed	   Indication:	  [ ] Strict I and O's    [ ] Obstruction     [ ] Incontinence + Stage 3 or 4 Decubitus  Central Line:  [ ] None	   [ ]  Medication / TPN Administration     [ ] No Peripheral IV        S: No new issues/events overnight, no new med c/o    O: ICU Vital Signs Last 24 Hrs  T(F): 98.5 (09-22-23 @ 05:07), Max: 98.5 (09-22-23 @ 05:07)  HR: 79 (09-22-23 @ 09:00) (76 - 82)  BP: 162/67 (09-22-23 @ 09:00) (134/96 - 194/88)  BP(mean): 97 (09-22-23 @ 09:00) (91 - 126)  ABP: --  RR: 22 (09-22-23 @ 09:00) (14 - 26)  SpO2: 98% (09-22-23 @ 09:00) (94% - 99%)    PHYSICAL EXAM:   Neurological: AAOx3, CNII-XII intact,  strength 5/5 b/l  ENT: mucus membrane moist  Cardiovascular: RRR  Respiratory: CTA  Gastrointestinal: soft, NT, ND, right sided ostomy milky yellowish drainage, left wound with wound manager with milky cloudy tanish colored drainage.   Extremities: warm, no dependent edema  Vascular: no cyanosis/erythema  Skin: no rashes  MSK: no joint swelling.     LABS:    09-21    136  |  102  |  35<H>  ----------------------------<  133<H>  3.9   |  26  |  1.28    Ca    8.5      21 Sep 2023 05:43  Phos  3.9     09-21  Mg     2.2     09-21                          9.0    8.96  )-----------( 261      ( 21 Sep 2023 05:43 )             28.4     Urinalysis Basic - ( 21 Sep 2023 05:43 )    Color: x / Appearance: x / SG: x / pH: x  Gluc: 133 mg/dL / Ketone: x  / Bili: x / Urobili: x   Blood: x / Protein: x / Nitrite: x   Leuk Esterase: x / RBC: x / WBC x   Sq Epi: x / Non Sq Epi: x / Bacteria: x    CAPILLARY BLOOD GLUCOSE      POCT Blood Glucose.: 121 mg/dL (22 Sep 2023 06:52)  POCT Blood Glucose.: 112 mg/dL (21 Sep 2023 23:02)  POCT Blood Glucose.: 101 mg/dL (21 Sep 2023 16:42)  POCT Blood Glucose.: 108 mg/dL (21 Sep 2023 12:08)    MEDICATIONS  (STANDING):  chlorhexidine 2% Cloths 1 Application(s) Topical <User Schedule>  erythromycin   IVPB 250 milliGRAM(s) IV Intermittent every 6 hours  glucagon  Injectable 1 milliGRAM(s) IntraMuscular once  heparin   Injectable 5000 Unit(s) SubCutaneous every 8 hours  insulin lispro (ADMELOG) corrective regimen sliding scale   SubCutaneous every 6 hours  levothyroxine Injectable 40 MICROGram(s) IV Push at bedtime  lipid, fat emulsion (Fish Oil and Plant Based) 20% Infusion 1 Gm/kG/Day (41.67 mL/Hr) IV Continuous <Continuous>  lipid, fat emulsion (Fish Oil and Plant Based) 20% Infusion 1 Gm/kG/Day (41.67 mL/Hr) IV Continuous <Continuous>  loperamide 4 milliGRAM(s) Oral every 6 hours  melatonin 5 milliGRAM(s) Oral at bedtime  metoprolol tartrate Injectable 5 milliGRAM(s) IV Push every 6 hours  nystatin Powder 1 Application(s) Topical three times a day  Parenteral Nutrition - Adult 1 Each (83 mL/Hr) TPN Continuous <Continuous>  Parenteral Nutrition - Adult 1 Each (83 mL/Hr) TPN Continuous <Continuous>    MEDICATIONS  (PRN):  benzocaine/menthol Lozenge 2 Lozenge Oral every 4 hours PRN Sore Throat  dextrose Oral Gel 15 Gram(s) Oral once PRN Blood Glucose LESS THAN 70 milliGRAM(s)/deciliter  ondansetron Injectable 4 milliGRAM(s) IV Push every 8 hours PRN Nausea and/or Vomiting      Poole:	  [x ] None	[ ] Daily Poole Order Placed	   Indication:	  [ ] Strict I and O's    [ ] Obstruction     [ ] Incontinence + Stage 3 or 4 Decubitus  Central Line:  [ ] None	   [x ]  Medication / TPN Administration     [ ] No Peripheral IV        S: c/o nausea, no vomiting, nausea seems a little better compared with yesterday,   denies abd pain. no other new med c/o    O: ICU Vital Signs Last 24 Hrs  T(F): 98.5 (09-22-23 @ 05:07), Max: 98.5 (09-22-23 @ 05:07)  HR: 79 (09-22-23 @ 09:00) (76 - 82)  BP: 162/67 (09-22-23 @ 09:00) (134/96 - 194/88)  BP(mean): 97 (09-22-23 @ 09:00) (91 - 126)  ABP: --  RR: 22 (09-22-23 @ 09:00) (14 - 26)  SpO2: 98% (09-22-23 @ 09:00) (94% - 99%)    PHYSICAL EXAM:   Neurological: AAOx3, CNII-XII intact,  strength 5/5 b/l  ENT: mucus membrane moist  Cardiovascular: RRR  Respiratory: CTA  Gastrointestinal: soft, NT, ND, right sided ostomy milky yellowish drainage, left wound with wound manager with milky cloudy tanish colored drainage.   Extremities: warm, no dependent edema  Vascular: no cyanosis/erythema  Skin: no rashes  MSK: no joint swelling.     LABS:    09-21    136  |  102  |  35<H>  ----------------------------<  133<H>  3.9   |  26  |  1.28    Ca    8.5      21 Sep 2023 05:43  Phos  3.9     09-21  Mg     2.2     09-21                          9.0    8.96  )-----------( 261      ( 21 Sep 2023 05:43 )             28.4     Urinalysis Basic - ( 21 Sep 2023 05:43 )    Color: x / Appearance: x / SG: x / pH: x  Gluc: 133 mg/dL / Ketone: x  / Bili: x / Urobili: x   Blood: x / Protein: x / Nitrite: x   Leuk Esterase: x / RBC: x / WBC x   Sq Epi: x / Non Sq Epi: x / Bacteria: x    CAPILLARY BLOOD GLUCOSE      POCT Blood Glucose.: 121 mg/dL (22 Sep 2023 06:52)  POCT Blood Glucose.: 112 mg/dL (21 Sep 2023 23:02)  POCT Blood Glucose.: 101 mg/dL (21 Sep 2023 16:42)  POCT Blood Glucose.: 108 mg/dL (21 Sep 2023 12:08)    MEDICATIONS  (STANDING):  chlorhexidine 2% Cloths 1 Application(s) Topical <User Schedule>  erythromycin   IVPB 250 milliGRAM(s) IV Intermittent every 6 hours  glucagon  Injectable 1 milliGRAM(s) IntraMuscular once  heparin   Injectable 5000 Unit(s) SubCutaneous every 8 hours  insulin lispro (ADMELOG) corrective regimen sliding scale   SubCutaneous every 6 hours  levothyroxine Injectable 40 MICROGram(s) IV Push at bedtime  lipid, fat emulsion (Fish Oil and Plant Based) 20% Infusion 1 Gm/kG/Day (41.67 mL/Hr) IV Continuous <Continuous>  lipid, fat emulsion (Fish Oil and Plant Based) 20% Infusion 1 Gm/kG/Day (41.67 mL/Hr) IV Continuous <Continuous>  loperamide 4 milliGRAM(s) Oral every 6 hours  melatonin 5 milliGRAM(s) Oral at bedtime  metoprolol tartrate Injectable 5 milliGRAM(s) IV Push every 6 hours  nystatin Powder 1 Application(s) Topical three times a day  Parenteral Nutrition - Adult 1 Each (83 mL/Hr) TPN Continuous <Continuous>  Parenteral Nutrition - Adult 1 Each (83 mL/Hr) TPN Continuous <Continuous>    MEDICATIONS  (PRN):  benzocaine/menthol Lozenge 2 Lozenge Oral every 4 hours PRN Sore Throat  dextrose Oral Gel 15 Gram(s) Oral once PRN Blood Glucose LESS THAN 70 milliGRAM(s)/deciliter  ondansetron Injectable 4 milliGRAM(s) IV Push every 8 hours PRN Nausea and/or Vomiting      Poole:	  [x ] None	[ ] Daily Poole Order Placed	   Indication:	  [ ] Strict I and O's    [ ] Obstruction     [ ] Incontinence + Stage 3 or 4 Decubitus  Central Line:  [ ] None	   [x ]  Medication / TPN Administration     [ ] No Peripheral IV

## 2023-09-23 NOTE — PROGRESS NOTE ADULT - SUBJECTIVE AND OBJECTIVE BOX
SUBJECTIVE:      MEDICATIONS  (STANDING):  amLODIPine   Tablet 5 milliGRAM(s) Oral daily  chlorhexidine 2% Cloths 1 Application(s) Topical <User Schedule>  dextrose 5%. 1000 milliLiter(s) (100 mL/Hr) IV Continuous <Continuous>  dextrose 5%. 1000 milliLiter(s) (50 mL/Hr) IV Continuous <Continuous>  dextrose 50% Injectable 25 Gram(s) IV Push once  dextrose 50% Injectable 12.5 Gram(s) IV Push once  dextrose 50% Injectable 25 Gram(s) IV Push once  erythromycin   IVPB 250 milliGRAM(s) IV Intermittent every 6 hours  glucagon  Injectable 1 milliGRAM(s) IntraMuscular once  heparin   Injectable 5000 Unit(s) SubCutaneous every 8 hours  insulin lispro (ADMELOG) corrective regimen sliding scale   SubCutaneous every 6 hours  levothyroxine Injectable 40 MICROGram(s) IV Push at bedtime  lipid, fat emulsion (Fish Oil and Plant Based) 20% Infusion 1 Gm/kG/Day (41.67 mL/Hr) IV Continuous <Continuous>  loperamide 4 milliGRAM(s) Oral every 6 hours  melatonin 5 milliGRAM(s) Oral at bedtime  metoprolol tartrate Injectable 5 milliGRAM(s) IV Push every 6 hours  nystatin Powder 1 Application(s) Topical three times a day  Parenteral Nutrition - Adult 1 Each (83 mL/Hr) TPN Continuous <Continuous>    MEDICATIONS  (PRN):  benzocaine/menthol Lozenge 2 Lozenge Oral every 4 hours PRN Sore Throat  dextrose Oral Gel 15 Gram(s) Oral once PRN Blood Glucose LESS THAN 70 milliGRAM(s)/deciliter  ondansetron Injectable 4 milliGRAM(s) IV Push every 8 hours PRN Nausea and/or Vomiting      Vital Signs Last 24 Hrs  T(C): 36.6 (23 Sep 2023 04:19), Max: 36.6 (22 Sep 2023 14:00)  T(F): 97.8 (23 Sep 2023 04:19), Max: 97.8 (22 Sep 2023 14:00)  HR: 81 (23 Sep 2023 06:00) (76 - 82)  BP: 138/64 (23 Sep 2023 06:00) (132/59 - 194/88)  BP(mean): 92 (23 Sep 2023 06:00) (85 - 126)  RR: 13 (23 Sep 2023 06:00) (12 - 39)  SpO2: 97% (23 Sep 2023 06:00) (96% - 100%)    Parameters below as of 23 Sep 2023 06:00  Patient On (Oxygen Delivery Method): room air        Physical Exam:  General: NAD, resting comfortably in bed  Pulmonary: Nonlabored breathing, no respiratory distress  Cardiovascular: NSR  Abdominal: soft, NT/ND  Extremities: WWP, normal strength  Neuro: A/O x 3, CNs II-XII grossly intact, no focal deficits    I&O's Summary    21 Sep 2023 07:01  -  22 Sep 2023 07:00  --------------------------------------------------------  IN: 4413 mL / OUT: 3795 mL / NET: 618 mL    22 Sep 2023 07:01  -  23 Sep 2023 06:36  --------------------------------------------------------  IN: 3272 mL / OUT: 3640 mL / NET: -368 mL        LABS:                        9.9    8.43  )-----------( 256      ( 23 Sep 2023 05:37 )             31.3     09-23    135  |  102  |  27<H>  ----------------------------<  112<H>  4.1   |  26  |  1.17    Ca    8.5      23 Sep 2023 05:37  Phos  3.4     09-23  Mg     2.0     09-23    TPro  8.1  /  Alb  2.2<L>  /  TBili  3.1<H>  /  DBili  x   /  AST  82<H>  /  ALT  68<H>  /  AlkPhos  158<H>  09-23      Urinalysis Basic - ( 23 Sep 2023 05:37 )    Color: x / Appearance: x / SG: x / pH: x  Gluc: 112 mg/dL / Ketone: x  / Bili: x / Urobili: x   Blood: x / Protein: x / Nitrite: x   Leuk Esterase: x / RBC: x / WBC x   Sq Epi: x / Non Sq Epi: x / Bacteria: x      CAPILLARY BLOOD GLUCOSE      POCT Blood Glucose.: 101 mg/dL (22 Sep 2023 23:05)  POCT Blood Glucose.: 101 mg/dL (22 Sep 2023 15:58)  POCT Blood Glucose.: 132 mg/dL (22 Sep 2023 11:20)  POCT Blood Glucose.: 121 mg/dL (22 Sep 2023 06:52)    LIVER FUNCTIONS - ( 23 Sep 2023 05:37 )  Alb: 2.2 g/dL / Pro: 8.1 g/dL / ALK PHOS: 158 U/L / ALT: 68 U/L / AST: 82 U/L / GGT: x             RADIOLOGY & ADDITIONAL STUDIES:   SUBJECTIVE:  Pt seen this AM on rounds. Pt tolerating sips of water w/ no nausea/vomiting. Pt endorses feeling comfortable w/ current wound dressing.     MEDICATIONS  (STANDING):  amLODIPine   Tablet 5 milliGRAM(s) Oral daily  chlorhexidine 2% Cloths 1 Application(s) Topical <User Schedule>  dextrose 5%. 1000 milliLiter(s) (100 mL/Hr) IV Continuous <Continuous>  dextrose 5%. 1000 milliLiter(s) (50 mL/Hr) IV Continuous <Continuous>  dextrose 50% Injectable 25 Gram(s) IV Push once  dextrose 50% Injectable 12.5 Gram(s) IV Push once  dextrose 50% Injectable 25 Gram(s) IV Push once  erythromycin   IVPB 250 milliGRAM(s) IV Intermittent every 6 hours  glucagon  Injectable 1 milliGRAM(s) IntraMuscular once  heparin   Injectable 5000 Unit(s) SubCutaneous every 8 hours  insulin lispro (ADMELOG) corrective regimen sliding scale   SubCutaneous every 6 hours  levothyroxine Injectable 40 MICROGram(s) IV Push at bedtime  lipid, fat emulsion (Fish Oil and Plant Based) 20% Infusion 1 Gm/kG/Day (41.67 mL/Hr) IV Continuous <Continuous>  loperamide 4 milliGRAM(s) Oral every 6 hours  melatonin 5 milliGRAM(s) Oral at bedtime  metoprolol tartrate Injectable 5 milliGRAM(s) IV Push every 6 hours  nystatin Powder 1 Application(s) Topical three times a day  Parenteral Nutrition - Adult 1 Each (83 mL/Hr) TPN Continuous <Continuous>    MEDICATIONS  (PRN):  benzocaine/menthol Lozenge 2 Lozenge Oral every 4 hours PRN Sore Throat  dextrose Oral Gel 15 Gram(s) Oral once PRN Blood Glucose LESS THAN 70 milliGRAM(s)/deciliter  ondansetron Injectable 4 milliGRAM(s) IV Push every 8 hours PRN Nausea and/or Vomiting      Vital Signs Last 24 Hrs  T(C): 36.6 (23 Sep 2023 04:19), Max: 36.6 (22 Sep 2023 14:00)  T(F): 97.8 (23 Sep 2023 04:19), Max: 97.8 (22 Sep 2023 14:00)  HR: 81 (23 Sep 2023 06:00) (76 - 82)  BP: 138/64 (23 Sep 2023 06:00) (132/59 - 194/88)  BP(mean): 92 (23 Sep 2023 06:00) (85 - 126)  RR: 13 (23 Sep 2023 06:00) (12 - 39)  SpO2: 97% (23 Sep 2023 06:00) (96% - 100%)    Parameters below as of 23 Sep 2023 06:00  Patient On (Oxygen Delivery Method): room air        Physical Exam:  General: NAD, resting comfortably in bed  Pulmonary: Nonlabored breathing, no respiratory distress  Cardiovascular: NSR  Abdominal: soft, NT/ND  Extremities: WWP, normal strength  Neuro: A/O x 3, CNs II-XII grossly intact, no focal deficits    I&O's Summary    21 Sep 2023 07:01  -  22 Sep 2023 07:00  --------------------------------------------------------  IN: 4413 mL / OUT: 3795 mL / NET: 618 mL    22 Sep 2023 07:01  -  23 Sep 2023 06:36  --------------------------------------------------------  IN: 3272 mL / OUT: 3640 mL / NET: -368 mL        LABS:                        9.9    8.43  )-----------( 256      ( 23 Sep 2023 05:37 )             31.3     09-23    135  |  102  |  27<H>  ----------------------------<  112<H>  4.1   |  26  |  1.17    Ca    8.5      23 Sep 2023 05:37  Phos  3.4     09-23  Mg     2.0     09-23    TPro  8.1  /  Alb  2.2<L>  /  TBili  3.1<H>  /  DBili  x   /  AST  82<H>  /  ALT  68<H>  /  AlkPhos  158<H>  09-23      Urinalysis Basic - ( 23 Sep 2023 05:37 )    Color: x / Appearance: x / SG: x / pH: x  Gluc: 112 mg/dL / Ketone: x  / Bili: x / Urobili: x   Blood: x / Protein: x / Nitrite: x   Leuk Esterase: x / RBC: x / WBC x   Sq Epi: x / Non Sq Epi: x / Bacteria: x      CAPILLARY BLOOD GLUCOSE      POCT Blood Glucose.: 101 mg/dL (22 Sep 2023 23:05)  POCT Blood Glucose.: 101 mg/dL (22 Sep 2023 15:58)  POCT Blood Glucose.: 132 mg/dL (22 Sep 2023 11:20)  POCT Blood Glucose.: 121 mg/dL (22 Sep 2023 06:52)    LIVER FUNCTIONS - ( 23 Sep 2023 05:37 )  Alb: 2.2 g/dL / Pro: 8.1 g/dL / ALK PHOS: 158 U/L / ALT: 68 U/L / AST: 82 U/L / GGT: x             RADIOLOGY & ADDITIONAL STUDIES:

## 2023-09-23 NOTE — PROGRESS NOTE ADULT - ASSESSMENT
78 M w/ Crohn's, AFib/Flutter s/p DCCVs on amiodarone, remote ileocectomy and open appendectomy. Admitted (6/23) for SBO vs Crohns flare, s/p NGT decompression and s/p lap TRE converted to open TRE, SBR x 3, left in discontinuity with abthera vac on (6/27), RTOR for ileocolic resection, small bowel anastomosis, and abdominal wall closure on (6/28), c/b fluid collection s/p IR aspiration of perihepatic fluid on (7/3), c/b wound dehiscence s/p RTOR exlap, washout, ileocolic resection with end ileostomy, blow hole colostomy, red rubber from ileostomy to small bowel anastomosis; vicryl bridging mesh on (7/5) transferred to SICU postoperatively for hemodynamic monitoring, with hospital course complicated by periods of AMS most recently on 9/1 and associated with oliguria, severe ELVI and hyponatremia, which resolved but now stepped back up for to SICU on 9/10 for acute AMS, intermittent hypoglycemia, AFib with RVR. Perc Choley tube placed on 9/11 for enlarged GB.     continue w/ wound manager   continue care per SICU team

## 2023-09-23 NOTE — PROGRESS NOTE ADULT - SUBJECTIVE AND OBJECTIVE BOX
5 [facility] ICU  (covering for Dr. Peterson)    Awake and alert.  Walking in hallway.  Taking some fluids po  On TPN  No pain or complaints.    Vital Signs Last 24 Hrs  T(C): 36.6 (23 Sep 2023 04:19), Max: 36.6 (22 Sep 2023 14:00)  T(F): 97.8 (23 Sep 2023 04:19), Max: 97.8 (22 Sep 2023 14:00)  HR: 79 (23 Sep 2023 10:00) (76 - 82)  BP: 180/75 (23 Sep 2023 10:00) (132/59 - 180/75)  BP(mean): 108 (23 Sep 2023 10:00) (85 - 111)  RR: 15 (23 Sep 2023 10:00) (12 - 29)  SpO2: 99% (23 Sep 2023 10:00) (97% - 100%)    Parameters below as of 23 Sep 2023 10:00  Patient On (Oxygen Delivery Method): room air    lungs: clear  abd: soft, non tender and not distended.  Wound manager in place with liquid white colored output.  stoma in RUQ with little output and kylee rubber cath inserted.    ext: without calf tenderness    UO:  3223 ml/24 until 7 AM; 600 ml since  fistula:  200 ml emptied this AM by me  cholecystotomy: 325 ml/24 h  L stoma 315 ml/24 hr  Drain 1: 20 ml/24 hr  Drain 2: 75 ml/24 hr                          9.9    8.43  )-----------( 256      ( 23 Sep 2023 05:37 )             31.3   09-23    135  |  102  |  27<H>  ----------------------------<  112<H>  4.1   |  26  |  1.17    Ca    8.5      23 Sep 2023 05:37  Phos  3.4     09-23  Mg     2.0     09-23    TPro  8.1  /  Alb  2.2<L>  /  TBili  3.1<H>  /  DBili  x   /  AST  82<H>  /  ALT  68<H>  /  AlkPhos  158<H>  09-23

## 2023-09-23 NOTE — PROGRESS NOTE ADULT - SUBJECTIVE AND OBJECTIVE BOX
S: nausea resolved. No new issues/events overnight, no new med c/o    O: ICU Vital Signs Last 24 Hrs  T(F): 97.7 (09-23-23 @ 11:00), Max: 97.8 (09-22-23 @ 14:00)  HR: 79 (09-23-23 @ 10:00) (76 - 82)  BP: 180/75 (09-23-23 @ 10:00) (132/59 - 180/75)  BP(mean): 108 (09-23-23 @ 10:00) (85 - 111)  ABP: --  RR: 15 (09-23-23 @ 10:00) (12 - 29)  SpO2: 99% (09-23-23 @ 10:00) (97% - 100%)    PHYSICAL EXAM:   Neurological: AAOx3, CNII-XII intact,  strength 5/5 b/l  ENT: mucus membrane moist  Cardiovascular: RRR  Respiratory: CTA  Gastrointestinal: soft, NT, ND, right sided ostomy milky yellowish drainage, left wound with wound manager with milky cloudy tanish colored drainage.   Extremities: warm, no dependent edema  Vascular: no cyanosis/erythema  Skin: no rashes  MSK: no joint swelling.       LABS:    09-23    135  |  102  |  27<H>  ----------------------------<  112<H>  4.1   |  26  |  1.17    Ca    8.5      23 Sep 2023 05:37  Phos  3.4     09-23  Mg     2.0     09-23    TPro  8.1  /  Alb  2.2<L>  /  TBili  3.1<H>  /  DBili  x   /  AST  82<H>  /  ALT  68<H>  /  AlkPhos  158<H>  09-23  LIVER FUNCTIONS - ( 23 Sep 2023 05:37 )  Alb: 2.2 g/dL / Pro: 8.1 g/dL / ALK PHOS: 158 U/L / ALT: 68 U/L / AST: 82 U/L / GGT: x                               9.9    8.43  )-----------( 256      ( 23 Sep 2023 05:37 )             31.3     Urinalysis Basic - ( 23 Sep 2023 05:37 )    Color: x / Appearance: x / SG: x / pH: x  Gluc: 112 mg/dL / Ketone: x  / Bili: x / Urobili: x   Blood: x / Protein: x / Nitrite: x   Leuk Esterase: x / RBC: x / WBC x   Sq Epi: x / Non Sq Epi: x / Bacteria: x    CAPILLARY BLOOD GLUCOSE      POCT Blood Glucose.: 111 mg/dL (23 Sep 2023 11:44)  POCT Blood Glucose.: 101 mg/dL (22 Sep 2023 23:05)  POCT Blood Glucose.: 101 mg/dL (22 Sep 2023 15:58)    MEDICATIONS  (STANDING):  amLODIPine   Tablet 5 milliGRAM(s) Oral daily  chlorhexidine 2% Cloths 1 Application(s) Topical <User Schedule>  erythromycin   IVPB 250 milliGRAM(s) IV Intermittent every 6 hours  glucagon  Injectable 1 milliGRAM(s) IntraMuscular once  heparin   Injectable 5000 Unit(s) SubCutaneous every 8 hours  insulin lispro (ADMELOG) corrective regimen sliding scale   SubCutaneous every 6 hours  levothyroxine Injectable 40 MICROGram(s) IV Push at bedtime  lipid, fat emulsion (Fish Oil and Plant Based) 20% Infusion 1 Gm/kG/Day (41.67 mL/Hr) IV Continuous <Continuous>  loperamide 4 milliGRAM(s) Oral every 6 hours  melatonin 5 milliGRAM(s) Oral at bedtime  metoprolol tartrate Injectable 5 milliGRAM(s) IV Push every 6 hours  nystatin Powder 1 Application(s) Topical three times a day  Parenteral Nutrition - Adult 1 Each (83 mL/Hr) TPN Continuous <Continuous>  Parenteral Nutrition - Adult 1 Each (83 mL/Hr) TPN Continuous <Continuous>    MEDICATIONS  (PRN):  benzocaine/menthol Lozenge 2 Lozenge Oral every 4 hours PRN Sore Throat  dextrose Oral Gel 15 Gram(s) Oral once PRN Blood Glucose LESS THAN 70 milliGRAM(s)/deciliter  ondansetron Injectable 4 milliGRAM(s) IV Push every 8 hours PRN Nausea and/or Vomiting      Poole:	  [ x] None	[ ] Daily Poole Order Placed	   Indication:	  [ ] Strict I and O's    [ ] Obstruction     [ ] Incontinence + Stage 3 or 4 Decubitus  Central Line:  [ ] None	   [x ]  Medication / TPN Administration     [ ] No Peripheral IV

## 2023-09-23 NOTE — PROGRESS NOTE ADULT - ASSESSMENT
78 M w/ Crohn's, AFib/Flutter s/p DCCVs on amiodarone, remote ileocectomy and open appendectomy. Admitted (6/23) for SBO vs Crohns flare, s/p NGT decompression and s/p lap TRE converted to open TRE, SBR x 3, left in discontinuity with abthera vac on (6/27), RTOR for ileocolic resection, small bowel anastomosis, and abdominal wall closure on (6/28), c/b fluid collection s/p IR aspiration of perihepatic fluid on (7/3), c/b wound dehiscence s/p RTOR exlap, washout, ileocolic resection with end ileostomy, blow hole colostomy, red rubber from ileostomy to small bowel anastomosis; vicryl bridging mesh on (7/5) transferred to SICU postoperatively for hemodynamic monitoring, with hospital course complicated by periods of AMS most recently on 9/1 and associated with oliguria, severe ELVI and hyponatremia, which resolved but now stepped back up for to SICU on 9/10 for acute AMS, intermittent hypoglycemia, AFib with RVR. Perc Choley tube placed on 9/11 for enlarged GB.     NEURO: AMS Resolved.  EEG neg, follow Neurology recs. Hx of depression - effexor Dc'd on 9/10. Pain control with Dilaudid for vac changes only. Cont Melatonin prn Insomnia, Cont Zofran   HENT: Cepacol  CV: Hx Afib/flutter: s/p DCCV, Off amiodarone given transaminitis; Continue Metoprolol 5q6. Hx HTN - Holding Losartan. Hx HLD: hold Lipitor given LFTs. TTE  (7/18) - PASP 64mmHg, EF 65-70%, BP borderline high - cont norvasc 5qd.   PULM: no resp distress on room air.   GI/FEN: CT scan showing Contrast not progressing past pyloris, suspect delayed gastric emptying. Cont NPO, cont Imodium Holding Lomotil. Cont Octreotide in TPN for high ostomy and ECF output. PICC/TPN; Lipids @100G, Dex@27/kg, AA @1.6 transaminitis of unknown origin, Now stable s/p Perc Choley on 9/11. PPI.   : Voids, S/p ELVI resolved  ENDO: mISS. Hx hypothyroid: IV Synthroid, TSH 2.180 (9.170),   ID: Numerous SICU admissions for sepsis. Currently off abx. Cont Nystatin powder   /// PREVIOUSLY had C. tertium, Lactobacillis from his IR cx 7/3, and candida albicans, lactobacillus, vanc sensitive E faecium, vanc resistant E gallinarum, vanc resistant E casseliflavus, lactobacillus from his OR cx 7/5. Completed course of abx with Imipenem (9/10-9/15). Imipenem (8/26--) imipenem (6/30-7/12, 7/23-7/30), Dapto (6/30-7/5 and 7/23-7/24).  empiric dapto (8/23-24) and cefepime (8/23-24).   PPX: SCDs, SQH  LINES: PIVs, LUE PICC (9/1- )  WOUNDS/DRAINS: Abdominal wound and fistula with wet to dry dressing changes by nurse q 3hrs and prn, Rt LQ Ostomy. JOYCE.   PT: Ordered Out of bed to chair walk in halls.

## 2023-09-23 NOTE — PROGRESS NOTE ADULT - ASSESSMENT
A/P  Stable  Wound manager is working.  UO and outputs are ok.  TPN as before  Continue ambulation and care as before

## 2023-09-24 NOTE — PROGRESS NOTE ADULT - ASSESSMENT
78 M w/ Crohn's, AFib/Flutter s/p DCCVs on amiodarone, remote ileocectomy and open appendectomy. Admitted (6/23) for SBO vs Crohns flare, s/p NGT decompression and s/p lap TRE converted to open TRE, SBR x 3, left in discontinuity with abthera vac on (6/27), RTOR for ileocolic resection, small bowel anastomosis, and abdominal wall closure on (6/28), c/b fluid collection s/p IR aspiration of perihepatic fluid on (7/3), c/b wound dehiscence s/p RTOR exlap, washout, ileocolic resection with end ileostomy, blow hole colostomy, red rubber from ileostomy to small bowel anastomosis; vicryl bridging mesh on (7/5) transferred to SICU postoperatively for hemodynamic monitoring, with hospital course complicated by periods of AMS most recently on 9/1 and associated with oliguria, severe ELVI and hyponatremia, which resolved but now stepped back up for to SICU on 9/10 for acute AMS, intermittent hypoglycemia, AFib with RVR. Perc Choley tube placed on 9/11 for enlarged GB.     NEURO: AMS Resolved.  EEG neg, follow Neurology recs. Hx of depression - effexor Dc'd on 9/10. Pain control with Dilaudid for vac changes only. Cont Melatonin prn Insomnia, Cont Zofran   HENT: Cepacol  CV: Hx Afib/flutter: s/p DCCV, Off amiodarone given transaminitis; Continue Metoprolol 5q6. Hx HTN - Holding Losartan. Hx HLD: hold Lipitor given LFTs. TTE  (7/18) - PASP 64mmHg, EF 65-70%, BP borderline high - cont norvasc 5qd.   PULM: no resp distress on room air.   GI/FEN: CT scan showing Contrast not progressing past pyloris, suspect delayed gastric emptying. on erythromycin for motility. Cont NPO, cont Imodium Holding Lomotil. Cont Octreotide in TPN for high ostomy and ECF output. PICC/TPN; Lipids @100G, Dex@27/kg, AA @1.6 transaminitis of unknown origin, Now stable s/p Perc Choley on 9/11. PPI.   : Voids, S/p ELVI resolved  ENDO: mISS. Hx hypothyroid: IV Synthroid, TSH 2.180 (9.170),   ID: Numerous SICU admissions for sepsis. Currently off abx. Cont Nystatin powder   /// PREVIOUSLY had C. tertium, Lactobacillis from his IR cx 7/3, and candida albicans, lactobacillus, vanc sensitive E faecium, vanc resistant E gallinarum, vanc resistant E casseliflavus, lactobacillus from his OR cx 7/5. Completed course of abx with Imipenem (9/10-9/15). Imipenem (8/26--) imipenem (6/30-7/12, 7/23-7/30), Dapto (6/30-7/5 and 7/23-7/24).  empiric dapto (8/23-24) and cefepime (8/23-24).   PPX: SCDs, SQH  LINES: PIVs, LUE PICC (9/1- )  WOUNDS/DRAINS: Abdominal wound and fistula with wet to dry dressing changes by nurse q 3hrs and prn, Rt LQ Ostomy. JOYCE.   PT: Ordered Out of bed to chair walk in halls.

## 2023-09-24 NOTE — PROGRESS NOTE ADULT - SUBJECTIVE AND OBJECTIVE BOX
S: No new issues/events overnight, no new med c/o    O: ICU Vital Signs Last 24 Hrs  T(F): 97.4 (09-24-23 @ 09:00), Max: 98.3 (09-23-23 @ 22:35)  HR: 79 (09-24-23 @ 10:00) (76 - 82)  BP: 141/65 (09-24-23 @ 10:00) (115/56 - 168/68)  BP(mean): 93 (09-24-23 @ 10:00) (80 - 118)  ABP: --  RR: 15 (09-24-23 @ 10:00) (12 - 34)  SpO2: 99% (09-24-23 @ 10:00) (97% - 100%)    PHYSICAL EXAM:   Neurological: AAOx3, CNII-XII intact,  strength 5/5 b/l  ENT: mucus membrane moist  Cardiovascular: RRR  Respiratory: CTA  Gastrointestinal: soft, NT, ND, right sided ostomy milky yellowish drainage, left wound with wound manager with milky cloudy tanish colored drainage.   Extremities: warm, no dependent edema  Vascular: no cyanosis/erythema  Skin: no rashes  MSK: no joint swelling.       LABS:    09-23    135  |  102  |  27<H>  ----------------------------<  112<H>  4.1   |  26  |  1.17    Ca    8.5      23 Sep 2023 05:37  Phos  3.4     09-23  Mg     2.0     09-23    TPro  8.1  /  Alb  2.2<L>  /  TBili  3.1<H>  /  DBili  x   /  AST  82<H>  /  ALT  68<H>  /  AlkPhos  158<H>  09-23  LIVER FUNCTIONS - ( 23 Sep 2023 05:37 )  Alb: 2.2 g/dL / Pro: 8.1 g/dL / ALK PHOS: 158 U/L / ALT: 68 U/L / AST: 82 U/L / GGT: x                               9.9    8.43  )-----------( 256      ( 23 Sep 2023 05:37 )             31.3     Urinalysis Basic - ( 23 Sep 2023 05:37 )    Color: x / Appearance: x / SG: x / pH: x  Gluc: 112 mg/dL / Ketone: x  / Bili: x / Urobili: x   Blood: x / Protein: x / Nitrite: x   Leuk Esterase: x / RBC: x / WBC x   Sq Epi: x / Non Sq Epi: x / Bacteria: x    CAPILLARY BLOOD GLUCOSE      POCT Blood Glucose.: 96 mg/dL (24 Sep 2023 06:46)  POCT Blood Glucose.: 104 mg/dL (23 Sep 2023 23:08)  POCT Blood Glucose.: 83 mg/dL (23 Sep 2023 18:00)  POCT Blood Glucose.: 97 mg/dL (23 Sep 2023 16:30)  POCT Blood Glucose.: 111 mg/dL (23 Sep 2023 11:44)    MEDICATIONS  (STANDING):  amLODIPine   Tablet 5 milliGRAM(s) Oral daily  chlorhexidine 2% Cloths 1 Application(s) Topical <User Schedule>  erythromycin   IVPB 250 milliGRAM(s) IV Intermittent every 6 hours  glucagon  Injectable 1 milliGRAM(s) IntraMuscular once  heparin   Injectable 5000 Unit(s) SubCutaneous every 8 hours  insulin lispro (ADMELOG) corrective regimen sliding scale   SubCutaneous every 6 hours  levothyroxine Injectable 40 MICROGram(s) IV Push at bedtime  lipid, fat emulsion (Fish Oil and Plant Based) 20% Infusion 1 Gm/kG/Day (41.67 mL/Hr) IV Continuous <Continuous>  lipid, fat emulsion (Fish Oil and Plant Based) 20% Infusion 1 Gm/kG/Day (41.67 mL/Hr) IV Continuous <Continuous>  loperamide 4 milliGRAM(s) Oral every 6 hours  melatonin 5 milliGRAM(s) Oral at bedtime  metoprolol tartrate Injectable 5 milliGRAM(s) IV Push every 6 hours  nystatin Powder 1 Application(s) Topical three times a day  Parenteral Nutrition - Adult 1 Each (83 mL/Hr) TPN Continuous <Continuous>  Parenteral Nutrition - Adult 1 Each (83 mL/Hr) TPN Continuous <Continuous>  Parenteral Nutrition - Adult 1 Each (83 mL/Hr) TPN Continuous <Continuous>    MEDICATIONS  (PRN):  benzocaine/menthol Lozenge 2 Lozenge Oral every 4 hours PRN Sore Throat  dextrose Oral Gel 15 Gram(s) Oral once PRN Blood Glucose LESS THAN 70 milliGRAM(s)/deciliter  ondansetron Injectable 4 milliGRAM(s) IV Push every 8 hours PRN Nausea and/or Vomiting      Poole:	  [ x] None	[ ] Daily Poole Order Placed	   Indication:	  [ ] Strict I and O's    [ ] Obstruction     [ ] Incontinence + Stage 3 or 4 Decubitus  Central Line:  [ ] None	   [x ]  Medication / TPN Administration     [ ] No Peripheral IV

## 2023-09-24 NOTE — PROGRESS NOTE ADULT - SUBJECTIVE AND OBJECTIVE BOX
STATUS POST:  Exploratory laparotomy    Laparoscopic lysis of intestinal adhesions    Exploratory laparotomy    Open lysis of intestinal adhesions    Resection of small bowel    Temporary closure of abdominal cavity    Repair, anastomosis, ileocolic    Small bowel resection with anastomosis    Flap, myocutaneous, abdominal wall    Reconstruction of abdominal wall using mesh    Washout of abdominal cavity    Creation of ileostomy    Creation of colostomy    Laparoscopic lysis of intestinal adhesions    Open lysis of intestinal adhesions    Resection of small bowel    Temporary closure of abdominal cavity    Repair, anastomosis, ileocolic    Small bowel resection with anastomosis    Flap, myocutaneous, abdominal wall    Reconstruction of abdominal wall using mesh    Washout of abdominal cavity        POST OPERATIVE DAY #:     SUBJECTIVE:     Vital Signs Last 24 Hrs  T(C): 36.3 (24 Sep 2023 02:16), Max: 36.8 (23 Sep 2023 22:35)  T(F): 97.4 (24 Sep 2023 02:16), Max: 98.3 (23 Sep 2023 22:35)  HR: 78 (24 Sep 2023 04:00) (76 - 82)  BP: 132/63 (24 Sep 2023 04:00) (115/56 - 180/75)  BP(mean): 90 (24 Sep 2023 04:00) (80 - 118)  RR: 13 (24 Sep 2023 04:00) (13 - 34)  SpO2: 99% (24 Sep 2023 04:00) (97% - 100%)    Parameters below as of 24 Sep 2023 05:00  Patient On (Oxygen Delivery Method): room air        I&O's Summary    22 Sep 2023 07:01  -  23 Sep 2023 07:00  --------------------------------------------------------  IN: 3688 mL / OUT: 4255 mL / NET: -567 mL    23 Sep 2023 07:01  -  24 Sep 2023 05:31  --------------------------------------------------------  IN: 3367.7 mL / OUT: 3165 mL / NET: 202.7 mL        Physical Exam:  General Appearance: Appears well, NAD  Pulmonary: Nonlabored breathing, no respiratory distress  Cardiovascular: NSR  Abdomen: Soft, nondisteded, appropriate incisional tenderness, dressings clean and dry and intact  Extremities: WWP, SCD's in place     LABS:                        9.9    8.43  )-----------( 256      ( 23 Sep 2023 05:37 )             31.3     09-23    135  |  102  |  27<H>  ----------------------------<  112<H>  4.1   |  26  |  1.17    Ca    8.5      23 Sep 2023 05:37  Phos  3.4     09-23  Mg     2.0     09-23    TPro  8.1  /  Alb  2.2<L>  /  TBili  3.1<H>  /  DBili  x   /  AST  82<H>  /  ALT  68<H>  /  AlkPhos  158<H>  09-23      Urinalysis Basic - ( 23 Sep 2023 05:37 )    Color: x / Appearance: x / SG: x / pH: x  Gluc: 112 mg/dL / Ketone: x  / Bili: x / Urobili: x   Blood: x / Protein: x / Nitrite: x   Leuk Esterase: x / RBC: x / WBC x   Sq Epi: x / Non Sq Epi: x / Bacteria: x         SUBJECTIVE: Pt seen and evaluated by chief resident on am rounds. Pt resting comfortably. AMRITA.    Vital Signs Last 24 Hrs  T(C): 36.3 (24 Sep 2023 02:16), Max: 36.8 (23 Sep 2023 22:35)  T(F): 97.4 (24 Sep 2023 02:16), Max: 98.3 (23 Sep 2023 22:35)  HR: 78 (24 Sep 2023 04:00) (76 - 82)  BP: 132/63 (24 Sep 2023 04:00) (115/56 - 180/75)  BP(mean): 90 (24 Sep 2023 04:00) (80 - 118)  RR: 13 (24 Sep 2023 04:00) (13 - 34)  SpO2: 99% (24 Sep 2023 04:00) (97% - 100%)    Parameters below as of 24 Sep 2023 05:00  Patient On (Oxygen Delivery Method): room air        I&O's Summary    22 Sep 2023 07:01  -  23 Sep 2023 07:00  --------------------------------------------------------  IN: 3688 mL / OUT: 4255 mL / NET: -567 mL    23 Sep 2023 07:01  -  24 Sep 2023 05:31  --------------------------------------------------------  IN: 3367.7 mL / OUT: 3165 mL / NET: 202.7 mL        Physical Exam:  General Appearance: Appears well, NAD  Pulmonary: Nonlabored breathing, no respiratory distress  Cardiovascular: NSR  Abdomen: Soft, wound manager in place  Extremities: WWP, SCD's in place     LABS:                        9.9    8.43  )-----------( 256      ( 23 Sep 2023 05:37 )             31.3     09-23    135  |  102  |  27<H>  ----------------------------<  112<H>  4.1   |  26  |  1.17    Ca    8.5      23 Sep 2023 05:37  Phos  3.4     09-23  Mg     2.0     09-23    TPro  8.1  /  Alb  2.2<L>  /  TBili  3.1<H>  /  DBili  x   /  AST  82<H>  /  ALT  68<H>  /  AlkPhos  158<H>  09-23      Urinalysis Basic - ( 23 Sep 2023 05:37 )    Color: x / Appearance: x / SG: x / pH: x  Gluc: 112 mg/dL / Ketone: x  / Bili: x / Urobili: x   Blood: x / Protein: x / Nitrite: x   Leuk Esterase: x / RBC: x / WBC x   Sq Epi: x / Non Sq Epi: x / Bacteria: x

## 2023-09-24 NOTE — PROGRESS NOTE ADULT - ASSESSMENT
A/P  stable  No major changes   Wound manager is working well  Continue oral liquids and TPN  Continue ambulation   Strict Ins and Outs  In chair and has been ambulating.     warm

## 2023-09-24 NOTE — PROGRESS NOTE ADULT - SUBJECTIVE AND OBJECTIVE BOX
78 M w/ Crohn's, AFib/Flutter s/p DCCVs on amiodarone, remote ileocectomy and open appendectomy. Admitted (6/23) for SBO vs Crohns flare, s/p NGT decompression and s/p lap TRE converted to open TRE, SBR x 3, left in discontinuity with abthera vac on (6/27), RTOR for ileocolic resection, small bowel anastomosis, and abdominal wall closure on (6/28), c/b fluid collection s/p IR aspiration of perihepatic fluid on (7/3), c/b wound dehiscence s/p RTOR exlap, washout, ileocolic resection with end ileostomy, blow hole colostomy, red rubber from ileostomy to small bowel anastomosis; vicryl bridging mesh on (7/5) transferred to SICU postoperatively for hemodynamic monitoring, with hospital course complicated by periods of AMS most recently on 9/1 and associated with oliguria, severe ELVI and hyponatremia, which resolved but now stepped back up for to SICU on 9/10 for acute AMS, intermittent hypoglycemia, AFib with RVR. Started on precedex for agitation overnight. CT abd 9/10 showing distended gallbladder with sludge. SP IR percutaneous cholecystostomy placement 9/11/23.    Perc cony: 630cc bilious outpt/24 hr, day prior 730cc. Dressing cdi. Flushed easily with 3cc NS. Continue to monitor. IR will continue to follow.

## 2023-09-24 NOTE — PROGRESS NOTE ADULT - SUBJECTIVE AND OBJECTIVE BOX
[facility]  (for Dr. Peterson)  On TPN    No complaints.  Tolerating some clear liquids  Ambulating     Vital Signs Last 24 Hrs  T(C): 36.3 (24 Sep 2023 09:00), Max: 36.8 (23 Sep 2023 22:35)  T(F): 97.4 (24 Sep 2023 09:00), Max: 98.3 (23 Sep 2023 22:35)  HR: 82 (24 Sep 2023 13:00) (76 - 82)  BP: 140/73 (24 Sep 2023 13:00) (115/56 - 168/68)  BP(mean): 97 (24 Sep 2023 13:00) (80 - 104)  RR: 18 (24 Sep 2023 13:00) (12 - 34)  SpO2: 97% (24 Sep 2023 13:00) (96% - 100%)    Parameters below as of 24 Sep 2023 13:00  Patient On (Oxygen Delivery Method): room air      Abd: soft, non tender. Wound manager in place, stoma viable minus change, drain tube in place    Ext: without calf tenderness     UO  2,375 ml/24 hrs; 1300 ml/last shift  Stoma  250 ml/24  lap cony drain: 630 ml/24 hr  Wound manager: 300 ml/24 hr                          9.9    8.43  )-----------( 256      ( 23 Sep 2023 05:37 )             31.3   09-23    135  |  102  |  27<H>  ----------------------------<  112<H>  4.1   |  26  |  1.17    Ca    8.5      23 Sep 2023 05:37  Phos  3.4     09-23  Mg     2.0     09-23    TPro  8.1  /  Alb  2.2<L>  /  TBili  3.1<H>  /  DBili  x   /  AST  82<H>  /  ALT  68<H>  /  AlkPhos  158<H>  09-23

## 2023-09-25 NOTE — PROGRESS NOTE ADULT - SUBJECTIVE AND OBJECTIVE BOX
SUBJECTIVE:    Patient endorses being comfortable with current wound manager   No acute events overnight or over the weekend   Patient well appearing with no complaints or concerns   Voiding spontaneously   Stool/enteric content per ostomy     MEDICATIONS  (STANDING):  amLODIPine   Tablet 5 milliGRAM(s) Oral daily  chlorhexidine 2% Cloths 1 Application(s) Topical <User Schedule>  dextrose 5%. 1000 milliLiter(s) (50 mL/Hr) IV Continuous <Continuous>  dextrose 5%. 1000 milliLiter(s) (100 mL/Hr) IV Continuous <Continuous>  dextrose 50% Injectable 25 Gram(s) IV Push once  dextrose 50% Injectable 12.5 Gram(s) IV Push once  dextrose 50% Injectable 25 Gram(s) IV Push once  erythromycin   IVPB 250 milliGRAM(s) IV Intermittent every 6 hours  glucagon  Injectable 1 milliGRAM(s) IntraMuscular once  heparin   Injectable 5000 Unit(s) SubCutaneous every 8 hours  insulin lispro (ADMELOG) corrective regimen sliding scale   SubCutaneous every 6 hours  levothyroxine Injectable 40 MICROGram(s) IV Push at bedtime  lipid, fat emulsion (Fish Oil and Plant Based) 20% Infusion 1 Gm/kG/Day (41.67 mL/Hr) IV Continuous <Continuous>  loperamide 4 milliGRAM(s) Oral every 6 hours  melatonin 5 milliGRAM(s) Oral at bedtime  metoprolol tartrate Injectable 5 milliGRAM(s) IV Push every 6 hours  nystatin Powder 1 Application(s) Topical three times a day  Parenteral Nutrition - Adult 1 Each (83 mL/Hr) TPN Continuous <Continuous>  Parenteral Nutrition - Adult 1 Each (83 mL/Hr) TPN Continuous <Continuous>    MEDICATIONS  (PRN):  benzocaine/menthol Lozenge 2 Lozenge Oral every 4 hours PRN Sore Throat  dextrose Oral Gel 15 Gram(s) Oral once PRN Blood Glucose LESS THAN 70 milliGRAM(s)/deciliter  ondansetron Injectable 4 milliGRAM(s) IV Push every 8 hours PRN Nausea and/or Vomiting      Vital Signs Last 24 Hrs  T(C): 36.9 (25 Sep 2023 17:37), Max: 36.9 (25 Sep 2023 01:01)  T(F): 98.4 (25 Sep 2023 17:37), Max: 98.5 (25 Sep 2023 01:01)  HR: 75 (25 Sep 2023 18:00) (75 - 86)  BP: 151/69 (25 Sep 2023 18:00) (112/59 - 161/70)  BP(mean): 99 (25 Sep 2023 18:00) (78 - 100)  RR: 14 (25 Sep 2023 18:00) (13 - 33)  SpO2: 99% (25 Sep 2023 18:00) (89% - 100%)    Parameters below as of 25 Sep 2023 18:00  Patient On (Oxygen Delivery Method): room air    Physical Exam:  GENERAL: NAD, resting comfortably in bed  HEENT: NCAT, MMM  C/V: Normal rate, normal peripheral perfusion, no murmurs  PULM: Nonlabored breathing, no respiratory distress in room aur  ABD: Soft, ND, NT, no rebound tenderness, no guarding. Wet-to-dry dressing over wound clean with minimal stool staining. Wound bed clean-appearing. RUQ ileostomy with minimal output, perc cony with bilious output. JOYCE in LUQ with minimal output. Fistula/ostomy with about 300 cc output   : Voiding spontaneously and adequately   EXTREM: WWP, no edema, SCDs in place  NEURO: No focal deficits    I&O's Summary    24 Sep 2023 07:01  -  25 Sep 2023 07:00  --------------------------------------------------------  IN: 4579.1 mL / OUT: 4080 mL / NET: 499.1 mL    25 Sep 2023 07:01  -  25 Sep 2023 19:23  --------------------------------------------------------  IN: 1496.2 mL / OUT: 1905 mL / NET: -408.8 mL        LABS:                        9.6    9.16  )-----------( 248      ( 25 Sep 2023 06:21 )             30.8     09-25    134<L>  |  100  |  31<H>  ----------------------------<  113<H>  4.2   |  27  |  1.23    Ca    8.9      25 Sep 2023 06:21  Phos  3.9     09-25  Mg     2.2     09-25    TPro  8.4<H>  /  Alb  2.4<L>  /  TBili  3.1<H>  /  DBili  2.0<H>  /  AST  72<H>  /  ALT  68<H>  /  AlkPhos  166<H>  09-25      Urinalysis Basic - ( 25 Sep 2023 06:21 )    Color: x / Appearance: x / SG: x / pH: x  Gluc: 113 mg/dL / Ketone: x  / Bili: x / Urobili: x   Blood: x / Protein: x / Nitrite: x   Leuk Esterase: x / RBC: x / WBC x   Sq Epi: x / Non Sq Epi: x / Bacteria: x      CAPILLARY BLOOD GLUCOSE      POCT Blood Glucose.: 100 mg/dL (25 Sep 2023 16:54)  POCT Blood Glucose.: 88 mg/dL (25 Sep 2023 12:06)  POCT Blood Glucose.: 102 mg/dL (25 Sep 2023 06:22)  POCT Blood Glucose.: 106 mg/dL (25 Sep 2023 00:43)    LIVER FUNCTIONS - ( 25 Sep 2023 06:21 )  Alb: 2.4 g/dL / Pro: 8.4 g/dL / ALK PHOS: 166 U/L / ALT: 68 U/L / AST: 72 U/L / GGT: x             RADIOLOGY & ADDITIONAL STUDIES:

## 2023-09-25 NOTE — PROGRESS NOTE ADULT - ASSESSMENT
78M w/ Crohn's, AFib/Flutter s/p DCCVs on amiodarone, remote ileocectomy and open appy here for SBO vs Crohns flare, s/p NGT decompression and now s/p lap TRE converted to open TRE, SBR x 3, left in discontinuity with abthera vac on 6/27, RTOR for ileocolic resection, small bowel anastomosis, and abdominal wall closure on 6/28, and s/p IR aspiration of perihepatic fluid on 7/3, stepped down to telemetry on 7/4. wound dehiscence on 7/5, RTOR ex-lap, washout, ileocolic resection with end ileostomy, blow hole colostomy, red rubber from ileostomy to small bowel anastomosis; Vicryl bridging mesh on 7/5. Transferred to SICU post op for HD monitoring. Extubated 7/6. Surgical wound with decreasing in size and granulating with Vac. Tentative plan for skin graft per Plastics; timing TBD pending proper wound shrinkage and granulation   8/23: Upgraded to SICU 8/23 for acute delirium and tremors, r/o sepsis vs metabolic encephalopathy. Ammonia levels normal    8/25: Spiked temp to 101.3 overnight w/ new leukocytosis to 14   8/28: Downgraded from SICU. No growth from blood cultures. Abx x 5 days until 8/29 8/30: Leukocytosis resolved   9/1: Decreased UOP, soft pressures. Cr 3.08 from 1.12. C/f dehydration 2/2 high fistula output vs ELVI   9/2: Upgraded to SICU for worsening ELVI. Stepped down 9/6   9/10: Upgraded to SICU for AMS, hypoglycemia and A-fib.   9/11: In setting of transaminitis, hyperbilirubinemia, leukocytosis and GB distention, underwent IR perc coyn     Hemodynamically normal and afebrile. Low fistula output. Skin graft will not be feasible per plastics given frequent leakage from fistula into wound bed.   Slight drop in urine output with simultaneous increase in fistula and JOYCE drain output since restarting clears. Output from JOYCE and RUQ ileostomy clear    Nausea/emesis; CT A/P overnight pending read   CT A/P 9/20 unremarkable   AXR 9/20 showed contrast pooling in stomach c/f gastroparesis. Persistent nausea likely secondary to gastroparesis. On Erythromycin     Plan:   - Continue Loperamide and Octreotide   - Continue Erythromycin   - Appreciate wound care management   - NPO/IVF/TPN   - Monitor perc cony, ostomy and JOYCE drain output   - PRN pain and nausea control   - Hx of A-fib/flutter; continue Lopressor   - DVT chemo & mechanical ppx   - OOB/PT     D/w SICU, chief resident and attending

## 2023-09-25 NOTE — PROGRESS NOTE ADULT - SUBJECTIVE AND OBJECTIVE BOX
SUBJECTIVE:   Patient seen and examined at bedside this AM   No acute events overnight or over the weekend   Patient well appearing with no complaints or concerns   Voiding spontaneously   Stool/enteric content per ostomy       MEDICATIONS  (STANDING):  amLODIPine   Tablet 5 milliGRAM(s) Oral daily  chlorhexidine 2% Cloths 1 Application(s) Topical <User Schedule>  dextrose 5%. 1000 milliLiter(s) (50 mL/Hr) IV Continuous <Continuous>  dextrose 5%. 1000 milliLiter(s) (100 mL/Hr) IV Continuous <Continuous>  dextrose 50% Injectable 25 Gram(s) IV Push once  dextrose 50% Injectable 12.5 Gram(s) IV Push once  dextrose 50% Injectable 25 Gram(s) IV Push once  erythromycin   IVPB 250 milliGRAM(s) IV Intermittent every 6 hours  glucagon  Injectable 1 milliGRAM(s) IntraMuscular once  heparin   Injectable 5000 Unit(s) SubCutaneous every 8 hours  insulin lispro (ADMELOG) corrective regimen sliding scale   SubCutaneous every 6 hours  levothyroxine Injectable 40 MICROGram(s) IV Push at bedtime  lipid, fat emulsion (Fish Oil and Plant Based) 20% Infusion 1 Gm/kG/Day (41.67 mL/Hr) IV Continuous <Continuous>  lipid, fat emulsion (Fish Oil and Plant Based) 20% Infusion 1 Gm/kG/Day (41.67 mL/Hr) IV Continuous <Continuous>  loperamide 4 milliGRAM(s) Oral every 6 hours  melatonin 5 milliGRAM(s) Oral at bedtime  metoprolol tartrate Injectable 5 milliGRAM(s) IV Push every 6 hours  nystatin Powder 1 Application(s) Topical three times a day  Parenteral Nutrition - Adult 1 Each (83 mL/Hr) TPN Continuous <Continuous>  Parenteral Nutrition - Adult 1 Each (83 mL/Hr) TPN Continuous <Continuous>    MEDICATIONS  (PRN):  benzocaine/menthol Lozenge 2 Lozenge Oral every 4 hours PRN Sore Throat  dextrose Oral Gel 15 Gram(s) Oral once PRN Blood Glucose LESS THAN 70 milliGRAM(s)/deciliter  ondansetron Injectable 4 milliGRAM(s) IV Push every 8 hours PRN Nausea and/or Vomiting      Vital Signs Last 24 Hrs  T(C): 36.9 (25 Sep 2023 09:00), Max: 36.9 (24 Sep 2023 17:44)  T(F): 98.5 (25 Sep 2023 09:00), Max: 98.5 (25 Sep 2023 01:01)  HR: 78 (25 Sep 2023 09:00) (70 - 82)  BP: 144/60 (25 Sep 2023 09:00) (112/59 - 169/70)  BP(mean): 96 (25 Sep 2023 09:00) (78 - 104)  RR: 20 (25 Sep 2023 09:00) (13 - 33)  SpO2: 91% (25 Sep 2023 09:00) (89% - 100%)    Parameters below as of 25 Sep 2023 09:00  Patient On (Oxygen Delivery Method): room air        Physical Exam:  GENERAL: NAD, resting comfortably in bed  HEENT: NCAT, MMM  C/V: Normal rate, normal peripheral perfusion, no murmurs  PULM: Nonlabored breathing, no respiratory distress in room aur  ABD: Soft, ND, NT, no rebound tenderness, no guarding. Wet-to-dry dressing over wound clean with minimal stool staining. Wound bed clean-appearing. RUQ ileostomy with minimal output, perc cony with bilious output. JOYCE in LUQ with minimal output. Fistula/ostomy with about 300 cc output   : Voiding spontaneously and adequately   EXTREM: WWP, no edema, SCDs in place  NEURO: No focal deficits    I&O's Summary    24 Sep 2023 07:01  -  25 Sep 2023 07:00  --------------------------------------------------------  IN: 4579.1 mL / OUT: 4080 mL / NET: 499.1 mL    25 Sep 2023 07:01  -  25 Sep 2023 11:04  --------------------------------------------------------  IN: 83 mL / OUT: 380 mL / NET: -297 mL        LABS:                        9.6    9.16  )-----------( 248      ( 25 Sep 2023 06:21 )             30.8     09-25    134<L>  |  100  |  31<H>  ----------------------------<  113<H>  4.2   |  27  |  1.23    Ca    8.9      25 Sep 2023 06:21  Phos  3.9     09-25  Mg     2.2     09-25    TPro  8.4<H>  /  Alb  2.4<L>  /  TBili  3.1<H>  /  DBili  2.0<H>  /  AST  72<H>  /  ALT  68<H>  /  AlkPhos  166<H>  09-25      Urinalysis Basic - ( 25 Sep 2023 06:21 )    Color: x / Appearance: x / SG: x / pH: x  Gluc: 113 mg/dL / Ketone: x  / Bili: x / Urobili: x   Blood: x / Protein: x / Nitrite: x   Leuk Esterase: x / RBC: x / WBC x   Sq Epi: x / Non Sq Epi: x / Bacteria: x      CAPILLARY BLOOD GLUCOSE      POCT Blood Glucose.: 102 mg/dL (25 Sep 2023 06:22)  POCT Blood Glucose.: 106 mg/dL (25 Sep 2023 00:43)  POCT Blood Glucose.: 95 mg/dL (24 Sep 2023 17:25)  POCT Blood Glucose.: 99 mg/dL (24 Sep 2023 11:27)    LIVER FUNCTIONS - ( 25 Sep 2023 06:21 )  Alb: 2.4 g/dL / Pro: 8.4 g/dL / ALK PHOS: 166 U/L / ALT: 68 U/L / AST: 72 U/L / GGT: x             RADIOLOGY & ADDITIONAL STUDIES:

## 2023-09-25 NOTE — PROGRESS NOTE ADULT - ASSESSMENT
A/P   Stable, no significant changes noted.  Creatinine a bit up but in range it has remained in x this past week  Wound manager output not that high yet the stoma output is very low.  Care as before  Wound  as per WOCN team  Ambulation important.  Clears as before

## 2023-09-25 NOTE — PROGRESS NOTE ADULT - SUBJECTIVE AND OBJECTIVE BOX
5 [facility] ICU  (covering for Dr. Peterson)    Comfortable and without complaints.  Tolerating some clears.  Was OOB in chair much of day yesterday  wound manager holding up.    Vital Signs Last 24 Hrs  T(C): 36.9 (25 Sep 2023 05:01), Max: 36.9 (24 Sep 2023 17:44)  T(F): 98.4 (25 Sep 2023 05:01), Max: 98.5 (25 Sep 2023 01:01)  HR: 80 (25 Sep 2023 08:00) (70 - 82)  BP: 122/58 (25 Sep 2023 08:00) (112/59 - 169/70)  BP(mean): 84 (25 Sep 2023 08:00) (78 - 104)  RR: 18 (25 Sep 2023 08:00) (12 - 33)  SpO2: 94% (25 Sep 2023 08:00) (89% - 100%)    Parameters below as of 25 Sep 2023 05:00  Patient On (Oxygen Delivery Method): room air    abd: not distended, soft. Stoma is viable minus change.  wound manager in place. cholecystomstomy tube in place and JOYCE as well     ml/12 hr, 1,836 ml/24 hr  wound manager:  45 ml/24 hr;  305 ml/24 hr  stoma  0 ml charted  cony drain  90 ml/12 hr,   515 ml/24 hr  JOYCE  10 ml/12 hr,  40 ml/24 hrs    TPN continues                          9.6    9.16  )-----------( 248      ( 25 Sep 2023 06:21 )             30.8   09-25    134<L>  |  100  |  31<H>  ----------------------------<  113<H>  4.2   |  27  |  1.23    Ca    8.9      25 Sep 2023 06:21  Phos  3.9     09-25  Mg     2.2     09-25    TPro  8.4<H>  /  Alb  2.4<L>  /  TBili  3.1<H>  /  DBili  2.0<H>  /  AST  72<H>  /  ALT  68<H>  /  AlkPhos  166<H>  09-25

## 2023-09-25 NOTE — PROGRESS NOTE ADULT - SUBJECTIVE AND OBJECTIVE BOX
Perc cony: 630 cc bilious outpt/24 hr, day prior 620 cc. Dressing cdi. Flushed easily with 3cc NS. Perc cony: 60 cc bilious outpt/24 hr, day prior 540 cc. Dressing cdi. Flushed easily with 5cc NS.

## 2023-09-25 NOTE — PROGRESS NOTE ADULT - ASSESSMENT
78M w/ Crohn's, AFib/Flutter s/p DCCVs on amiodarone, remote ileocectomy and open appy here for SBO vs Crohns flare, s/p NGT decompression and now s/p lap TRE converted to open TRE, SBR x 3, left in discontinuity with abthera vac on 6/27, RTOR for ileocolic resection, small bowel anastomosis, and abdominal wall closure on 6/28, and s/p IR aspiration of perihepatic fluid on 7/3, stepped down to telemetry on 7/4. wound dehiscence on 7/5, RTOR ex-lap, washout, ileocolic resection with end ileostomy, blow hole colostomy, red rubber from ileostomy to small bowel anastomosis; Vicryl bridging mesh on 7/5. Transferred to SICU post op for HD monitoring. Extubated 7/6. Surgical wound with decreasing in size and granulating with Vac. Tentative plan for skin graft per Plastics; timing TBD pending proper wound shrinkage and granulation   8/23: Upgraded to SICU 8/23 for acute delirium and tremors, r/o sepsis vs metabolic encephalopathy. Ammonia levels normal    8/25: Spiked temp to 101.3 overnight w/ new leukocytosis to 14   8/28: Downgraded from SICU. No growth from blood cultures. Abx x 5 days until 8/29 8/30: Leukocytosis resolved   9/1: Decreased UOP, soft pressures. Cr 3.08 from 1.12. C/f dehydration 2/2 high fistula output vs ELVI   9/2: Upgraded to SICU for worsening ELVI. Stepped down 9/6   9/10: Upgraded to SICU for AMS, hypoglycemia and A-fib.   9/11: In setting of transaminitis, hyperbilirubinemia, leukocytosis and GB distention, underwent IR perc cony     Hemodynamically normal and afebrile. Low fistula output. Skin graft will not be feasible per plastics given frequent leakage from fistula into wound bed.   Slight drop in urine output with simultaneous increase in fistula and JOYCE drain output since restarting clears. Output from JOYCE and RUQ ileostomy clear    Nausea/emesis; CT A/P overnight pending read   CT A/P 9/20 unremarkable   AXR 9/20 showed contrast pooling in stomach c/f gastroparesis. Persistent nausea likely secondary to gastroparesis. On Erythromycin     Plan:   - Continue Loperamide and Octreotide   - Continue Erythromycin   - Appreciate wound care management, will coordinate for joint dressing change 9/26  - NPO/IVF/TPN   - Monitor perc cony, ostomy and JOYCE drain output   - PRN pain and nausea control   - Hx of A-fib/flutter; continue Lopressor   - DVT chemo & mechanical ppx   - OOB/PT     D/w SICU, chief resident and attending

## 2023-09-25 NOTE — PROGRESS NOTE ADULT - SUBJECTIVE AND OBJECTIVE BOX
SUBJECTIVE: Pt seen and examined at bedside this am by ICU team. Patient is lying comfortably in bed with no complaints. Denies pain. Patient denies fever, nausea, vomiting, chest pain, and shortness of breath.     MEDICATIONS  (STANDING):  amLODIPine   Tablet 5 milliGRAM(s) Oral daily  chlorhexidine 2% Cloths 1 Application(s) Topical <User Schedule>  dextrose 5%. 1000 milliLiter(s) (50 mL/Hr) IV Continuous <Continuous>  dextrose 5%. 1000 milliLiter(s) (100 mL/Hr) IV Continuous <Continuous>  dextrose 50% Injectable 25 Gram(s) IV Push once  dextrose 50% Injectable 12.5 Gram(s) IV Push once  dextrose 50% Injectable 25 Gram(s) IV Push once  erythromycin   IVPB 250 milliGRAM(s) IV Intermittent every 6 hours  glucagon  Injectable 1 milliGRAM(s) IntraMuscular once  heparin   Injectable 5000 Unit(s) SubCutaneous every 8 hours  insulin lispro (ADMELOG) corrective regimen sliding scale   SubCutaneous every 6 hours  levothyroxine Injectable 40 MICROGram(s) IV Push at bedtime  lipid, fat emulsion (Fish Oil and Plant Based) 20% Infusion 1 Gm/kG/Day (41.67 mL/Hr) IV Continuous <Continuous>  loperamide 4 milliGRAM(s) Oral every 6 hours  melatonin 5 milliGRAM(s) Oral at bedtime  metoprolol tartrate Injectable 5 milliGRAM(s) IV Push every 6 hours  nystatin Powder 1 Application(s) Topical three times a day  Parenteral Nutrition - Adult 1 Each (83 mL/Hr) TPN Continuous <Continuous>  Parenteral Nutrition - Adult 1 Each (83 mL/Hr) TPN Continuous <Continuous>    MEDICATIONS  (PRN):  benzocaine/menthol Lozenge 2 Lozenge Oral every 4 hours PRN Sore Throat  dextrose Oral Gel 15 Gram(s) Oral once PRN Blood Glucose LESS THAN 70 milliGRAM(s)/deciliter  ondansetron Injectable 4 milliGRAM(s) IV Push every 8 hours PRN Nausea and/or Vomiting      Drips:     ICU Vital Signs Last 24 Hrs  T(C): 36.9 (25 Sep 2023 09:00), Max: 36.9 (24 Sep 2023 17:44)  T(F): 98.5 (25 Sep 2023 09:00), Max: 98.5 (25 Sep 2023 01:01)  HR: 79 (25 Sep 2023 11:00) (70 - 82)  BP: 159/70 (25 Sep 2023 10:00) (112/59 - 169/70)  BP(mean): 100 (25 Sep 2023 10:00) (78 - 104)  ABP: --  ABP(mean): --  RR: 19 (25 Sep 2023 11:00) (13 - 39)  SpO2: 98% (25 Sep 2023 11:00) (89% - 100%)    O2 Parameters below as of 25 Sep 2023 09:00  Patient On (Oxygen Delivery Method): room air        Physical Exam:  General: NAD  HEENT: NC/AT, EOMI, PERRLA, normal hearing, no oral lesions, neck supple w/o LAD  Pulmonary: Nonlabored breathing, no respiratory distress, CTA-B  Cardiovascular: NSR, no murmurs  Abdominal: soft, NT/ND,  right sided ostomy milky yellowish drainage, left wound with wound manager with milky cloudy tanish colored drainage.   Extremities: WWP, 5/5 strength x 4, no clubbing/cyanosis/edema  Neuro: A/O x3, CNs II-XII grossly intact, normal motor/sensation, no focal deficits  Pulses: palpable distal pulses    Vent settings:      I&O's Summary    24 Sep 2023 07:01  -  25 Sep 2023 07:00  --------------------------------------------------------  IN: 4579.1 mL / OUT: 4080 mL / NET: 499.1 mL    25 Sep 2023 07:01  -  25 Sep 2023 11:42  --------------------------------------------------------  IN: 83 mL / OUT: 380 mL / NET: -297 mL        LABS:                        9.6    9.16  )-----------( 248      ( 25 Sep 2023 06:21 )             30.8     09-25    134<L>  |  100  |  31<H>  ----------------------------<  113<H>  4.2   |  27  |  1.23    Ca    8.9      25 Sep 2023 06:21  Phos  3.9     09-25  Mg     2.2     09-25    TPro  8.4<H>  /  Alb  2.4<L>  /  TBili  3.1<H>  /  DBili  2.0<H>  /  AST  72<H>  /  ALT  68<H>  /  AlkPhos  166<H>  09-25      Urinalysis Basic - ( 25 Sep 2023 06:21 )    Color: x / Appearance: x / SG: x / pH: x  Gluc: 113 mg/dL / Ketone: x  / Bili: x / Urobili: x   Blood: x / Protein: x / Nitrite: x   Leuk Esterase: x / RBC: x / WBC x   Sq Epi: x / Non Sq Epi: x / Bacteria: x      CAPILLARY BLOOD GLUCOSE      POCT Blood Glucose.: 102 mg/dL (25 Sep 2023 06:22)  POCT Blood Glucose.: 106 mg/dL (25 Sep 2023 00:43)  POCT Blood Glucose.: 95 mg/dL (24 Sep 2023 17:25)    LIVER FUNCTIONS - ( 25 Sep 2023 06:21 )  Alb: 2.4 g/dL / Pro: 8.4 g/dL / ALK PHOS: 166 U/L / ALT: 68 U/L / AST: 72 U/L / GGT: x             Cultures:    RADIOLOGY & ADDITIONAL STUDIES:

## 2023-09-25 NOTE — PROGRESS NOTE ADULT - ASSESSMENT
78 M w/ Crohn's, AFib/Flutter s/p DCCVs on amiodarone, remote ileocectomy and open appendectomy. Admitted (6/23) for SBO vs Crohns flare, s/p NGT decompression and s/p lap TRE converted to open TRE, SBR x 3, left in discontinuity with abthera vac on (6/27), RTOR for ileocolic resection, small bowel anastomosis, and abdominal wall closure on (6/28), c/b fluid collection s/p IR aspiration of perihepatic fluid on (7/3), c/b wound dehiscence s/p RTOR exlap, washout, ileocolic resection with end ileostomy, blow hole colostomy, red rubber from ileostomy to small bowel anastomosis; vicryl bridging mesh on (7/5) transferred to SICU postoperatively for hemodynamic monitoring, with hospital course complicated by periods of AMS most recently on 9/1 and associated with oliguria, severe ELVI and hyponatremia, which resolved but now stepped back up for to SICU on 9/10 for acute AMS, intermittent hypoglycemia, AFib with RVR. Started on precedex for agitation overnight. CT abd 9/10 showing distended gallbladder with sludge. SP IR percutaneous cholecystostomy placement 9/11/23.    -Monitor output  -IR to follow

## 2023-09-26 NOTE — PROGRESS NOTE ADULT - SUBJECTIVE AND OBJECTIVE BOX
ON: fistula with 300cc output    SUBJECTIVE: Patient seen and examined at bedside. In good spirits, able to rhoda some PO without n/v, pain is well-controlled. Denies cp, sob. Voiding without issues.    MEDICATIONS  (STANDING):  aMIOdarone    Tablet 200 milliGRAM(s) Oral daily  amLODIPine   Tablet 10 milliGRAM(s) Oral daily  atorvastatin 20 milliGRAM(s) Oral at bedtime  chlorhexidine 2% Cloths 1 Application(s) Topical <User Schedule>  cholestyramine Powder (Sugar-Free) 4 Gram(s) Oral two times a day  dextrose 5%. 1000 milliLiter(s) (100 mL/Hr) IV Continuous <Continuous>  dextrose 5%. 1000 milliLiter(s) (50 mL/Hr) IV Continuous <Continuous>  dextrose 50% Injectable 25 Gram(s) IV Push once  dextrose 50% Injectable 12.5 Gram(s) IV Push once  dextrose 50% Injectable 25 Gram(s) IV Push once  glucagon  Injectable 1 milliGRAM(s) IntraMuscular once  heparin   Injectable 5000 Unit(s) SubCutaneous every 8 hours  insulin lispro (ADMELOG) corrective regimen sliding scale   SubCutaneous every 6 hours  levothyroxine 50 MICROGram(s) Oral daily  lipid, fat emulsion (Fish Oil and Plant Based) 20% Infusion 0.5 Gm/kG/Day (20.83 mL/Hr) IV Continuous <Continuous>  losartan 25 milliGRAM(s) Oral daily  metoprolol tartrate 25 milliGRAM(s) Oral every 12 hours  nystatin Powder 1 Application(s) Topical three times a day  octreotide  Injectable 100 MICROGram(s) IV Push every 8 hours  pantoprazole  Injectable 40 milliGRAM(s) IV Push daily  Parenteral Nutrition - Adult 1 Each (96 mL/Hr) TPN Continuous <Continuous>  Parenteral Nutrition - Adult 1 Each (96 mL/Hr) TPN Continuous <Continuous>  venlafaxine XR. 300 milliGRAM(s) Oral daily    MEDICATIONS  (PRN):  benzocaine/menthol Lozenge 2 Lozenge Oral every 4 hours PRN Sore Throat  dextrose Oral Gel 15 Gram(s) Oral once PRN Blood Glucose LESS THAN 70 milliGRAM(s)/deciliter  hydrALAZINE Injectable 10 milliGRAM(s) IV Push every 4 hours PRN SBP > 140  melatonin 5 milliGRAM(s) Oral at bedtime PRN Sleep      Drips:     ICU Vital Signs Last 24 Hrs  T(C): 36.6 (31 Jul 2023 09:26), Max: 36.9 (30 Jul 2023 17:25)  T(F): 97.8 (31 Jul 2023 09:26), Max: 98.5 (30 Jul 2023 17:25)  HR: 84 (31 Jul 2023 11:00) (78 - 87)  BP: 124/59 (31 Jul 2023 11:00) (123/57 - 158/73)  BP(mean): 83 (31 Jul 2023 11:00) (81 - 105)  ABP: --  ABP(mean): --  RR: 24 (31 Jul 2023 11:00) (19 - 37)  SpO2: 96% (31 Jul 2023 11:00) (93% - 97%)    O2 Parameters below as of 31 Jul 2023 12:00  Patient On (Oxygen Delivery Method): room air            Physical Exam:  General: NAD  HEENT: NC/AT, EOMI, PERRLA, normal hearing, no oral lesions, neck supple w/o LAD  Pulmonary: Nonlabored breathing, no respiratory distress, CTA-B, decreased breath sounds in b/l lung bases L>R  Cardiovascular: NSR, no murmurs  Abdominal: soft, minimally distended, nontender. Vac in place w/ good suction w/ bilious output in cannister, ostomy ppp w/ minimal output, isolated EC fistula w/ scant bilious output in pouch and red rubber catheter attached to wall suction, some bilious matter in wound bed. Mucous fistula bilious output in pouch.  Extremities: WWP, 5/5 strength x 4, no clubbing/cyanosis/edema  Neuro: A/O x3, CNs II-XII grossly intact, normal motor/sensation, no focal deficits  Pulses: palpable distal pulses    Lines/tubes/drains: RUE PICC w/ TPN    I&O's Summary    30 Jul 2023 07:01  -  31 Jul 2023 07:00  --------------------------------------------------------  IN: 3167 mL / OUT: 3590 mL / NET: -423 mL    31 Jul 2023 07:01  -  31 Jul 2023 12:26  --------------------------------------------------------  IN: 480 mL / OUT: 100 mL / NET: 380 mL        LABS:                        9.1    14.42 )-----------( 347      ( 31 Jul 2023 07:19 )             29.0     07-31    134<L>  |  101  |  20  ----------------------------<  91  4.2   |  25  |  0.77    Ca    7.7<L>      31 Jul 2023 07:19  Phos  3.3     07-31  Mg     2.0     07-31    TPro  7.3  /  Alb  2.1<L>  /  TBili  3.6<H>  /  DBili  x   /  AST  140<H>  /  ALT  99<H>  /  AlkPhos  177<H>  07-31      Urinalysis Basic - ( 31 Jul 2023 07:19 )    Color: x / Appearance: x / SG: x / pH: x  Gluc: 91 mg/dL / Ketone: x  / Bili: x / Urobili: x   Blood: x / Protein: x / Nitrite: x   Leuk Esterase: x / RBC: x / WBC x   Sq Epi: x / Non Sq Epi: x / Bacteria: x      CAPILLARY BLOOD GLUCOSE      POCT Blood Glucose.: 117 mg/dL (31 Jul 2023 07:41)  POCT Blood Glucose.: 114 mg/dL (30 Jul 2023 23:54)  POCT Blood Glucose.: 110 mg/dL (30 Jul 2023 18:32)    LIVER FUNCTIONS - ( 31 Jul 2023 07:19 )  Alb: 2.1 g/dL / Pro: 7.3 g/dL / ALK PHOS: 177 U/L / ALT: 99 U/L / AST: 140 U/L / GGT: x             Cultures:    RADIOLOGY & ADDITIONAL STUDIES:     kilo

## 2023-09-26 NOTE — PROGRESS NOTE ADULT - ASSESSMENT
78 M w/ Crohn's, AFib/Flutter s/p DCCVs on amiodarone, remote ileocectomy and open appendectomy. Admitted (6/23) for SBO vs Crohns flare, s/p NGT decompression and s/p lap TRE converted to open TRE, SBR x 3, left in discontinuity with abthera vac on (6/27), RTOR for ileocolic resection, small bowel anastomosis, and abdominal wall closure on (6/28), c/b fluid collection s/p IR aspiration of perihepatic fluid on (7/3), c/b wound dehiscence s/p RTOR exlap, washout, ileocolic resection with end ileostomy, blow hole colostomy, red rubber from ileostomy to small bowel anastomosis; vicryl bridging mesh on (7/5) transferred to SICU postoperatively for hemodynamic monitoring, with hospital course complicated by periods of AMS most recently on 9/1 and associated with oliguria, severe ELVI and hyponatremia, which resolved but now stepped back up for to SICU on 9/10 for acute AMS, intermittent hypoglycemia, AFib with RVR. Perc Choley tube placed on 9/11 for enlarged GB.     NEURO: AMS Resolved. EEG neg, follow Neurology recs. Hx of depression - effexor Dc'd on 9/10. Pain control with Dilaudid for vac changes only. Cont Melatonin prn Insomnia, Cont Zofran   HENT: Cepacol  CV: Hx Afib/flutter: s/p DCCV, Off amiodarone given transaminitis; Continue Metoprolol 5q6. Hx HTN - Holding Losartan. Hx HLD: hold Lipitor given LFTs. TTE  (7/18) - PASP 64mmHg, EF 65-70%, BP borderline high - cont norvasc 5qd.   PULM: no resp distress on room air.   GI/FEN: CT scan showing Contrast not progressing past pyloris, suspect delayed gastric emptying. on erythromycin for motility. Cont NPO, cont Imodium, Holding Lomotil. Cont Octreotide in TPN for high ostomy and ECF output. PICC/TPN; Lipids @100G, Dex@27/kg, AA @1.6. transaminitis of unknown origin, Now stable s/p Perc Choley on 9/11. PPI.   : Voids, S/p ELVI resolved  ENDO: mISS. Hx hypothyroid: IV Synthroid, TSH 2.180 (9.170),   ID: Numerous SICU admissions for sepsis. Currently off abx. Cont Nystatin powder   /// PREVIOUSLY had C. tertium, Lactobacillis from his IR cx 7/3, and candida albicans, lactobacillus, vanc sensitive E faecium, vanc resistant E gallinarum, vanc resistant E casseliflavus, lactobacillus from his OR cx 7/5. Completed course of abx with Imipenem (9/10-9/15). Imipenem (8/26--) imipenem (6/30-7/12, 7/23-7/30), Dapto (6/30-7/5 and 7/23-7/24).  empiric dapto (8/23-24) and cefepime (8/23-24).   PPX: SCDs, SQH  LINES: PIVs, LUE PICC (9/1- )  WOUNDS/DRAINS: Abdominal wound and fistula with wound manager in place--change today. Rt LQ Ostomy. JOYCE.   PT: Ambulate as tolerated

## 2023-09-26 NOTE — ADVANCED PRACTICE NURSE CONSULT - ASSESSMENT
Wound manager started leaking late yesterday. Pt reports feeling well, remains NPO, on TPN. OOB to chair and walking in the hallways. Denies pain.    Abdomen: 10.5 x 7cm with 100% red granulation tissue. Few scattered areas of hypergranulation tissue. Stomatized fistula at 2 o'clock with thin clear effluent. Periwound skin is intact without irritation; fungal rash resolved. LLQ JOYCE drain with thin yellow drainage. RLQ ileostomy stoma retracted, small less than 1 inch ovoid, peristomal skin intact. Stoma intubated with red rubber catheter.    Skin thoroughly cleansed. Hypergranulation tissue treated with silver nitrate.  Cavilon No Sting Barrier film applied to periwound, section of VAC isolator along edge of fistula, Adapt ostomy rings, stoma paste applied around wound and within creases.  Entire area pouched with an Dustin wound manager # [id number].   2 piece 1 3/4" pouch with Adapt ring applied to RLQ Ileostomy.

## 2023-09-26 NOTE — PROGRESS NOTE ADULT - SUBJECTIVE AND OBJECTIVE BOX
SUBJECTIVE:  No acute events overnight   Patient well appearing with no complaints or concerns, mood appropriate, endorses feeling well.   Expresses comfort with wound manager (less leakage)  Tolerating ambulation   No nausea endorsed   Voiding spontaneously   Enteric content in ileostomy site   Percutaneous cholecystotomy tube with bilious drainage     MEDICATIONS  (STANDING):  amLODIPine   Tablet 5 milliGRAM(s) Oral daily  chlorhexidine 2% Cloths 1 Application(s) Topical <User Schedule>  dextrose 5%. 1000 milliLiter(s) (100 mL/Hr) IV Continuous <Continuous>  dextrose 5%. 1000 milliLiter(s) (50 mL/Hr) IV Continuous <Continuous>  dextrose 50% Injectable 25 Gram(s) IV Push once  dextrose 50% Injectable 12.5 Gram(s) IV Push once  dextrose 50% Injectable 25 Gram(s) IV Push once  erythromycin   IVPB 250 milliGRAM(s) IV Intermittent every 6 hours  glucagon  Injectable 1 milliGRAM(s) IntraMuscular once  heparin   Injectable 5000 Unit(s) SubCutaneous every 8 hours  insulin lispro (ADMELOG) corrective regimen sliding scale   SubCutaneous every 6 hours  levothyroxine Injectable 40 MICROGram(s) IV Push at bedtime  lipid, fat emulsion (Fish Oil and Plant Based) 20% Infusion 1 Gm/kG/Day (41.67 mL/Hr) IV Continuous <Continuous>  lipid, fat emulsion (Fish Oil and Plant Based) 20% Infusion 1 Gm/kG/Day (41.67 mL/Hr) IV Continuous <Continuous>  loperamide 4 milliGRAM(s) Oral every 6 hours  melatonin 5 milliGRAM(s) Oral at bedtime  metoprolol tartrate Injectable 5 milliGRAM(s) IV Push every 6 hours  nystatin Powder 1 Application(s) Topical three times a day  Parenteral Nutrition - Adult 1 Each (83 mL/Hr) TPN Continuous <Continuous>  Parenteral Nutrition - Adult 1 Each (83 mL/Hr) TPN Continuous <Continuous>    MEDICATIONS  (PRN):  benzocaine/menthol Lozenge 2 Lozenge Oral every 4 hours PRN Sore Throat  dextrose Oral Gel 15 Gram(s) Oral once PRN Blood Glucose LESS THAN 70 milliGRAM(s)/deciliter  ondansetron Injectable 4 milliGRAM(s) IV Push every 8 hours PRN Nausea and/or Vomiting      Vital Signs Last 24 Hrs  T(C): 37.1 (26 Sep 2023 05:01), Max: 37.1 (26 Sep 2023 05:01)  T(F): 98.7 (26 Sep 2023 05:01), Max: 98.7 (26 Sep 2023 05:01)  HR: 81 (26 Sep 2023 07:00) (75 - 86)  BP: 120/55 (26 Sep 2023 07:00) (111/55 - 165/72)  BP(mean): 79 (26 Sep 2023 07:00) (77 - 108)  RR: 18 (26 Sep 2023 07:00) (14 - 32)  SpO2: 97% (26 Sep 2023 07:00) (91% - 100%)    Parameters below as of 26 Sep 2023 08:00  Patient On (Oxygen Delivery Method): room air    Physical Exam:  GENERAL: NAD, resting comfortably in bed  HEENT: NCAT, MMM  C/V: Normal rate, normal peripheral perfusion, no murmurs  PULM: Nonlabored breathing, no respiratory distress in room aur  ABD: Soft, ND, NT, no rebound tenderness, no guarding. Wet-to-dry dressing over wound clean with minimal stool staining. Wound bed clean-appearing. RUQ ileostomy with minimal output, perc cony with bilious output. JOYCE in LUQ with minimal output. Fistula/ostomy with low output   : Voiding spontaneously and adequately   EXTREM: WWP, no edema, SCDs in place  NEURO: No focal deficits    I&O's Summary    25 Sep 2023 07:01  -  26 Sep 2023 07:00  --------------------------------------------------------  IN: 3691.2 mL / OUT: 3935 mL / NET: -243.8 mL    26 Sep 2023 07:01  -  26 Sep 2023 08:08  --------------------------------------------------------  IN: 83 mL / OUT: 0 mL / NET: 83 mL        LABS:                        9.6    9.16  )-----------( 248      ( 25 Sep 2023 06:21 )             30.8     09-25    134<L>  |  100  |  31<H>  ----------------------------<  113<H>  4.2   |  27  |  1.23    Ca    8.9      25 Sep 2023 06:21  Phos  3.9     09-25  Mg     2.2     09-25    TPro  8.4<H>  /  Alb  2.4<L>  /  TBili  3.1<H>  /  DBili  2.0<H>  /  AST  72<H>  /  ALT  68<H>  /  AlkPhos  166<H>  09-25      Urinalysis Basic - ( 25 Sep 2023 06:21 )    Color: x / Appearance: x / SG: x / pH: x  Gluc: 113 mg/dL / Ketone: x  / Bili: x / Urobili: x   Blood: x / Protein: x / Nitrite: x   Leuk Esterase: x / RBC: x / WBC x   Sq Epi: x / Non Sq Epi: x / Bacteria: x      CAPILLARY BLOOD GLUCOSE      POCT Blood Glucose.: 100 mg/dL (25 Sep 2023 22:26)  POCT Blood Glucose.: 100 mg/dL (25 Sep 2023 16:54)  POCT Blood Glucose.: 88 mg/dL (25 Sep 2023 12:06)    LIVER FUNCTIONS - ( 25 Sep 2023 06:21 )  Alb: 2.4 g/dL / Pro: 8.4 g/dL / ALK PHOS: 166 U/L / ALT: 68 U/L / AST: 72 U/L / GGT: x             RADIOLOGY & ADDITIONAL STUDIES:

## 2023-09-26 NOTE — PROGRESS NOTE ADULT - ASSESSMENT
78M w/ Crohn's, AFib/Flutter s/p DCCVs on amiodarone, remote ileocectomy and open appy here for SBO vs Crohns flare, s/p NGT decompression and now s/p lap TRE converted to open TRE, SBR x 3, left in discontinuity with abthera vac on 6/27, RTOR for ileocolic resection, small bowel anastomosis, and abdominal wall closure on 6/28, and s/p IR aspiration of perihepatic fluid on 7/3, stepped down to telemetry on 7/4. wound dehiscence on 7/5, RTOR ex-lap, washout, ileocolic resection with end ileostomy, blow hole colostomy, red rubber from ileostomy to small bowel anastomosis; Vicryl bridging mesh on 7/5. Transferred to SICU post op for HD monitoring. Extubated 7/6. Surgical wound with decreasing in size and granulating with Vac. Tentative plan for skin graft per Plastics; timing TBD pending proper wound shrinkage and granulation   8/23: Upgraded to SICU 8/23 for acute delirium and tremors, r/o sepsis vs metabolic encephalopathy. Ammonia levels normal    8/25: Spiked temp to 101.3 overnight w/ new leukocytosis to 14   8/28: Downgraded from SICU. No growth from blood cultures. Abx x 5 days until 8/29 8/30: Leukocytosis resolved   9/1: Decreased UOP, soft pressures. Cr 3.08 from 1.12. C/f dehydration 2/2 high fistula output vs ELVI   9/2: Upgraded to SICU for worsening ELVI. Stepped down 9/6   9/10: Upgraded to SICU for AMS, hypoglycemia and A-fib.   9/11: In setting of transaminitis, hyperbilirubinemia, leukocytosis and GB distention, underwent IR perc cony     Hemodynamically normal and afebrile. Low fistula output. Skin graft will not be feasible per plastics given frequent leakage from fistula into wound bed.   Slight drop in urine output with simultaneous increase in fistula and JOYCE drain output since restarting clears. Output from JOYCE and RUQ ileostomy clear    Nausea/emesis; CT A/P overnight pending read   CT A/P 9/20 unremarkable   AXR 9/20 showed contrast pooling in stomach c/f gastroparesis. On Erythromycin. Nausea resolved     Plan:   - Continue Loperamide and Octreotide; hold off on Lomotil    - Appreciate wound care management; tentative plan for wound care today   - NPO/IVF/TPN   - Monitor perc cony, ostomy and JOYCE drain output   - PRN pain and nausea control   - Hx of A-fib/flutter; continue Lopressor   - DVT chemo & mechanical ppx   - OOB/PT     D/w SICU, chief resident and attending

## 2023-09-26 NOTE — CHART NOTE - NSCHARTNOTEFT_GEN_A_CORE
78 M w/ Crohn's, AFib/Flutter s/p DCCVs on amiodarone, remote ileocectomy and open appendectomy. Admitted (6/23) for SBO vs Crohns flare, s/p NGT decompression and s/p lap TRE converted to open TRE, SBR x 3, left in discontinuity with abthera vac on (6/27), RTOR for ileocolic resection, small bowel anastomosis, and abdominal wall closure on (6/28), c/b fluid collection s/p IR aspiration of perihepatic fluid on (7/3), c/b wound dehiscence s/p RTOR exlap, washout, ileocolic resection with end ileostomy, blow hole colostomy, red rubber from ileostomy to small bowel anastomosis; vicryl bridging mesh on (7/5) transferred to SICU postoperatively for hemodynamic monitoring, with hospital course complicated by periods of AMS most recently on 9/1 and associated with oliguria, severe ELVI and hyponatremia, which resolved but now stepped back up for to SICU on 9/10 for acute AMS, intermittent hypoglycemia, AFib with RVR. CT abd 9/10 showing distended gallbladder with sludge. SP IR percutaneous cholecystostomy placement 9/11/23.    Perc cony: output 385 ml bilious fluid/24h (prior 24h 540ml). drain flushed easily with 5ml NS.     Recommendations:  -Monitor output  -IR to follow

## 2023-09-26 NOTE — PROGRESS NOTE ADULT - ASSESSMENT
Plan:     - Appreciate wound care management; tentative plan for wound care today   - NPO/IVF/TPN   - Monitor perc cony, ostomy and JOYCE drain output   - PRN pain and nausea control   - Hx of A-fib/flutter; continue Lopressor   - DVT chemo & mechanical ppx   - OOB/PT

## 2023-09-26 NOTE — PROGRESS NOTE ADULT - SUBJECTIVE AND OBJECTIVE BOX
INTERVAL/OVERNIGHT EVENTS: Wound manager patched.    SUBJECTIVE: This AM doing well without pain. No N/V or abd pain. The wound manager is working well and pt able to ambulate with it in place, less leakage than with vac. No issues voiding.    Neurologic Medications  melatonin 5 milliGRAM(s) Oral at bedtime  ondansetron Injectable 4 milliGRAM(s) IV Push every 8 hours PRN Nausea and/or Vomiting    Cardiovascular Medications  amLODIPine   Tablet 5 milliGRAM(s) Oral daily  metoprolol tartrate Injectable 5 milliGRAM(s) IV Push every 6 hours    Gastrointestinal Medications  lipid, fat emulsion (Fish Oil and Plant Based) 20% Infusion 1 Gm/kG/Day IV Continuous <Continuous>  loperamide 4 milliGRAM(s) Oral every 6 hours  Parenteral Nutrition - Adult 1 Each TPN Continuous <Continuous>    Hematologic/Oncologic Medications  heparin   Injectable 5000 Unit(s) SubCutaneous every 8 hours    Antimicrobial/Immunologic Medications  erythromycin   IVPB 250 milliGRAM(s) IV Intermittent every 6 hours    Endocrine/Metabolic Medications  insulin lispro (ADMELOG) corrective regimen sliding scale   SubCutaneous every 6 hours  levothyroxine Injectable 40 MICROGram(s) IV Push at bedtime    Topical/Other Medications  benzocaine/menthol Lozenge 2 Lozenge Oral every 4 hours PRN Sore Throat  chlorhexidine 2% Cloths 1 Application(s) Topical <User Schedule>  nystatin Powder 1 Application(s) Topical three times a day    MEDICATIONS  (PRN):  benzocaine/menthol Lozenge 2 Lozenge Oral every 4 hours PRN Sore Throat  ondansetron Injectable 4 milliGRAM(s) IV Push every 8 hours PRN Nausea and/or Vomiting    I&O's Detail    25 Sep 2023 07:01  -  26 Sep 2023 07:00  --------------------------------------------------------  IN:    Fat Emulsion (Fish Oil &amp; Plant Based) 20% Infusion: 499.2 mL    IV PiggyBack: 1000 mL    Oral Fluid: 200 mL    TPN (Total Parenteral Nutrition): 1992 mL  Total IN: 3691.2 mL    OUT:    Bulb (mL): 30 mL    Drain (mL): 325 mL    Drain (mL): 245 mL    Drain (mL): 385 mL    Voided (mL): 2950 mL  Total OUT: 3935 mL    Total NET: -243.8 mL    26 Sep 2023 07:01  -  26 Sep 2023 12:39  --------------------------------------------------------  IN:    IV PiggyBack: 250 mL    TPN (Total Parenteral Nutrition): 415 mL  Total IN: 665 mL    OUT:    Drain (mL): 75 mL    Fat Emulsion (Fish Oil &amp; Plant Based) 20% Infusion: 0 mL    Voided (mL): 550 mL  Total OUT: 625 mL    Total NET: 40 mL    Vital Signs Last 24 Hrs  T(C): 37.1 (26 Sep 2023 09:00), Max: 37.1 (26 Sep 2023 05:01)  T(F): 98.8 (26 Sep 2023 09:00), Max: 98.8 (26 Sep 2023 09:00)  HR: 81 (26 Sep 2023 12:00) (75 - 86)  BP: 121/56 (26 Sep 2023 12:00) (111/55 - 165/72)  BP(mean): 81 (26 Sep 2023 12:00) (77 - 108)  RR: 20 (26 Sep 2023 12:00) (14 - 32)  SpO2: 99% (26 Sep 2023 12:00) (95% - 100%)    Parameters below as of 26 Sep 2023 08:00  Patient On (Oxygen Delivery Method): room air    GENERAL: NAD, resting comfortably in chair  HEENT: NCAT, MMM  C/V: Normal rate, normal peripheral perfusion, no murmurs  PULM: Nonlabored breathing, no respiratory distress, CTA  ABD: Soft, ND, NT, no rebound tenderness, no guarding, wound manager in place holding good seal, feculent output in bag.   EXTREM: WWP, no edema, SCDs in place  NEURO: No focal deficits    LABS:                        9.6    9.16  )-----------( 248      ( 25 Sep 2023 06:21 )             30.8     09-25    134<L>  |  100  |  31<H>  ----------------------------<  113<H>  4.2   |  27  |  1.23    Ca    8.9      25 Sep 2023 06:21  Phos  3.9     09-25  Mg     2.2     09-25    TPro  8.4<H>  /  Alb  2.4<L>  /  TBili  3.1<H>  /  DBili  2.0<H>  /  AST  72<H>  /  ALT  68<H>  /  AlkPhos  166<H>  09-25      Urinalysis Basic - ( 25 Sep 2023 06:21 )    Color: x / Appearance: x / SG: x / pH: x  Gluc: 113 mg/dL / Ketone: x  / Bili: x / Urobili: x   Blood: x / Protein: x / Nitrite: x   Leuk Esterase: x / RBC: x / WBC x   Sq Epi: x / Non Sq Epi: x / Bacteria: x

## 2023-09-26 NOTE — PROGRESS NOTE ADULT - SUBJECTIVE AND OBJECTIVE BOX
SUBJECTIVE:   Patient seen and examined at bedside this AM   No acute events overnight  Patient well appearing with no complaints or concerns   Nausea resolved   Voiding spontaneously   Stool/enteric content per ostomy       MEDICATIONS  (STANDING):  amLODIPine   Tablet 5 milliGRAM(s) Oral daily  chlorhexidine 2% Cloths 1 Application(s) Topical <User Schedule>  dextrose 5%. 1000 milliLiter(s) (50 mL/Hr) IV Continuous <Continuous>  dextrose 5%. 1000 milliLiter(s) (100 mL/Hr) IV Continuous <Continuous>  dextrose 50% Injectable 25 Gram(s) IV Push once  dextrose 50% Injectable 12.5 Gram(s) IV Push once  dextrose 50% Injectable 25 Gram(s) IV Push once  erythromycin   IVPB 250 milliGRAM(s) IV Intermittent every 6 hours  glucagon  Injectable 1 milliGRAM(s) IntraMuscular once  heparin   Injectable 5000 Unit(s) SubCutaneous every 8 hours  insulin lispro (ADMELOG) corrective regimen sliding scale   SubCutaneous every 6 hours  levothyroxine Injectable 40 MICROGram(s) IV Push at bedtime  lipid, fat emulsion (Fish Oil and Plant Based) 20% Infusion 1 Gm/kG/Day (41.67 mL/Hr) IV Continuous <Continuous>  lipid, fat emulsion (Fish Oil and Plant Based) 20% Infusion 1 Gm/kG/Day (41.67 mL/Hr) IV Continuous <Continuous>  loperamide 4 milliGRAM(s) Oral every 6 hours  melatonin 5 milliGRAM(s) Oral at bedtime  metoprolol tartrate Injectable 5 milliGRAM(s) IV Push every 6 hours  nystatin Powder 1 Application(s) Topical three times a day  Parenteral Nutrition - Adult 1 Each (83 mL/Hr) TPN Continuous <Continuous>  Parenteral Nutrition - Adult 1 Each (83 mL/Hr) TPN Continuous <Continuous>    MEDICATIONS  (PRN):  benzocaine/menthol Lozenge 2 Lozenge Oral every 4 hours PRN Sore Throat  dextrose Oral Gel 15 Gram(s) Oral once PRN Blood Glucose LESS THAN 70 milliGRAM(s)/deciliter  ondansetron Injectable 4 milliGRAM(s) IV Push every 8 hours PRN Nausea and/or Vomiting      Vital Signs Last 24 Hrs  T(C): 37.1 (26 Sep 2023 05:01), Max: 37.1 (26 Sep 2023 05:01)  T(F): 98.7 (26 Sep 2023 05:01), Max: 98.7 (26 Sep 2023 05:01)  HR: 81 (26 Sep 2023 06:00) (75 - 86)  BP: 118/57 (26 Sep 2023 06:00) (111/55 - 165/72)  BP(mean): 82 (26 Sep 2023 06:00) (77 - 108)  RR: 15 (26 Sep 2023 06:00) (14 - 32)  SpO2: 96% (26 Sep 2023 06:00) (91% - 100%)    Parameters below as of 26 Sep 2023 07:00  Patient On (Oxygen Delivery Method): room air        Physical Exam:  GENERAL: NAD, resting comfortably in bed  HEENT: NCAT, MMM  C/V: Normal rate, normal peripheral perfusion, no murmurs  PULM: Nonlabored breathing, no respiratory distress in room aur  ABD: Soft, ND, NT, no rebound tenderness, no guarding. Wet-to-dry dressing over wound clean with minimal stool staining. Wound bed clean-appearing. RUQ ileostomy with minimal output, perc cony with bilious output. JOYCE in LUQ with minimal output. Fistula/ostomy with low output   : Voiding spontaneously and adequately   EXTREM: WWP, no edema, SCDs in place  NEURO: No focal deficits      I&O's Summary    25 Sep 2023 07:01  -  26 Sep 2023 07:00  --------------------------------------------------------  IN: 3756 mL / OUT: 2855 mL / NET: 901 mL        LABS:                        9.6    9.16  )-----------( 248      ( 25 Sep 2023 06:21 )             30.8     09-25    134<L>  |  100  |  31<H>  ----------------------------<  113<H>  4.2   |  27  |  1.23    Ca    8.9      25 Sep 2023 06:21  Phos  3.9     09-25  Mg     2.2     09-25    TPro  8.4<H>  /  Alb  2.4<L>  /  TBili  3.1<H>  /  DBili  2.0<H>  /  AST  72<H>  /  ALT  68<H>  /  AlkPhos  166<H>  09-25      Urinalysis Basic - ( 25 Sep 2023 06:21 )    Color: x / Appearance: x / SG: x / pH: x  Gluc: 113 mg/dL / Ketone: x  / Bili: x / Urobili: x   Blood: x / Protein: x / Nitrite: x   Leuk Esterase: x / RBC: x / WBC x   Sq Epi: x / Non Sq Epi: x / Bacteria: x      CAPILLARY BLOOD GLUCOSE      POCT Blood Glucose.: 100 mg/dL (25 Sep 2023 22:26)  POCT Blood Glucose.: 100 mg/dL (25 Sep 2023 16:54)  POCT Blood Glucose.: 88 mg/dL (25 Sep 2023 12:06)    LIVER FUNCTIONS - ( 25 Sep 2023 06:21 )  Alb: 2.4 g/dL / Pro: 8.4 g/dL / ALK PHOS: 166 U/L / ALT: 68 U/L / AST: 72 U/L / GGT: x             RADIOLOGY & ADDITIONAL STUDIES:

## 2023-09-27 NOTE — PROGRESS NOTE ADULT - ASSESSMENT
78 M w/ Crohn's, AFib/Flutter s/p DCCVs on amiodarone, remote ileocectomy and open appendectomy. Admitted (6/23) for SBO vs Crohns flare, s/p NGT decompression and s/p lap TRE converted to open TRE, SBR x 3, left in discontinuity with abthera vac on (6/27), RTOR for ileocolic resection, small bowel anastomosis, and abdominal wall closure on (6/28), c/b fluid collection s/p IR aspiration of perihepatic fluid on (7/3), c/b wound dehiscence s/p RTOR exlap, washout, ileocolic resection with end ileostomy, blow hole colostomy, red rubber from ileostomy to small bowel anastomosis; vicryl bridging mesh on (7/5) transferred to SICU postoperatively for hemodynamic monitoring, with hospital course complicated by periods of AMS most recently on 9/1 and associated with oliguria, severe ELVI and hyponatremia, which resolved but now stepped back up for to SICU on 9/10 for acute AMS, intermittent hypoglycemia, AFib with RVR. Perc Choley tube placed on 9/11 for enlarged GB.     NEURO: AMS (resolved), EEG neg. Hx of depression - effexor Dc'd on 9/10. Pain control with Dilaudid for vac changes only. Cont Melatonin prn Insomnia, Cont Zofran   HENT: Cepacol  CV: Hx Afib/flutter: s/p DCCV, Off amiodarone given transaminitis; Continue Metoprolol 5q6. Hx HTN - Holding Losartan. Hx HLD: hold Lipitor given LFTs. TTE  (7/18) - PASP 64mmHg, EF 65-70%, HTN: started norvasc 5qd.   PULM: no resp distress on room air.   GI/FEN: CT suspect delayed gastric emptying, on erythromycin for motility. Cont NPO, cont Imodium, Holding Lomotil. Cont Octreotide in TPN for high ostomy and ECF output. PICC/TPN (Lipids 100gm, Dex@27/kg, AA @1.6). Transaminitis of unknown origin, now stable. s/p Perc Choley on 9/11. PPI.   : Voids, S/p ELVI resolved.  ENDO: mISS. Hx hypothyroid: IV Synthroid, TSH wnl on 9/10  ID: Numerous SICU admissions for sepsis. Currently off abx. Cont Nystatin powder   // PREVIOUSLY had C. tertium, Lactobacillis from his IR cx 7/3, and candida albicans, lactobacillus, vanc sensitive E faecium, vanc resistant E gallinarum, vanc resistant E casseliflavus, lactobacillus from his OR cx 7/5. Completed course of abx with Imipenem (9/10-9/15). Imipenem (8/26--) imipenem (6/30-7/12, 7/23-7/30), Dapto (6/30-7/5 and 7/23-7/24).  empiric dapto (8/23-24) and cefepime (8/23-24).   PPX: SCDs, SQH  LINES: PIVs, LUE PICC (9/1- )  WOUNDS/DRAINS: Abdominal wound and fistula with wound manager in place. Rt LQ Ostomy. JOYCE.   PT: Ambulate as tolerated

## 2023-09-27 NOTE — PROGRESS NOTE ADULT - SUBJECTIVE AND OBJECTIVE BOX
SUBJECTIVE:  Patient seen and examined at bedside this AM   No acute events overnight. Endorses being comfortable with wound manager  Patient well appearing with no complaints or concerns   Denied nausea or emesis  Voiding spontaneously     MEDICATIONS  (STANDING):  amLODIPine   Tablet 5 milliGRAM(s) Oral daily  chlorhexidine 2% Cloths 1 Application(s) Topical <User Schedule>  dextrose 5%. 1000 milliLiter(s) (100 mL/Hr) IV Continuous <Continuous>  dextrose 5%. 1000 milliLiter(s) (50 mL/Hr) IV Continuous <Continuous>  dextrose 50% Injectable 25 Gram(s) IV Push once  dextrose 50% Injectable 12.5 Gram(s) IV Push once  dextrose 50% Injectable 25 Gram(s) IV Push once  erythromycin   IVPB 250 milliGRAM(s) IV Intermittent every 6 hours  glucagon  Injectable 1 milliGRAM(s) IntraMuscular once  heparin   Injectable 5000 Unit(s) SubCutaneous every 8 hours  insulin lispro (ADMELOG) corrective regimen sliding scale   SubCutaneous every 6 hours  levothyroxine Injectable 40 MICROGram(s) IV Push at bedtime  lipid, fat emulsion (Fish Oil and Plant Based) 20% Infusion 1 Gm/kG/Day (41.67 mL/Hr) IV Continuous <Continuous>  loperamide 4 milliGRAM(s) Oral every 6 hours  melatonin 5 milliGRAM(s) Oral at bedtime  metoprolol tartrate Injectable 5 milliGRAM(s) IV Push every 6 hours  nystatin Powder 1 Application(s) Topical three times a day  Parenteral Nutrition - Adult 1 Each (83 mL/Hr) TPN Continuous <Continuous>  Parenteral Nutrition - Adult 1 Each (83 mL/Hr) TPN Continuous <Continuous>    MEDICATIONS  (PRN):  benzocaine/menthol Lozenge 2 Lozenge Oral every 4 hours PRN Sore Throat  dextrose Oral Gel 15 Gram(s) Oral once PRN Blood Glucose LESS THAN 70 milliGRAM(s)/deciliter  ondansetron Injectable 4 milliGRAM(s) IV Push every 8 hours PRN Nausea and/or Vomiting      Vital Signs Last 24 Hrs  T(C): 36.4 (27 Sep 2023 13:00), Max: 37.4 (27 Sep 2023 01:00)  T(F): 97.5 (27 Sep 2023 13:00), Max: 99.3 (27 Sep 2023 01:00)  HR: 78 (27 Sep 2023 14:00) (77 - 174)  BP: 130/61 (27 Sep 2023 13:00) (116/60 - 168/77)  BP(mean): 88 (27 Sep 2023 13:00) (81 - 105)  RR: 23 (27 Sep 2023 14:00) (11 - 29)  SpO2: 100% (27 Sep 2023 14:00) (95% - 100%)    Parameters below as of 27 Sep 2023 14:00  Patient On (Oxygen Delivery Method): room air      Physical Exam:  GENERAL: NAD, resting comfortably in bed  HEENT: NCAT, MMM  C/V: Normal rate, normal peripheral perfusion, no murmurs  PULM: Nonlabored breathing, no respiratory distress in room aur  ABD: Soft, ND, NT, no rebound tenderness, no guarding. Wound manager over midline wound with fistula/ostomy. RUQ ileostomy with minimal output, perc cony with bilious output. JOYCE in LUQ with minimal output. Fistula/ostomy with low output   : Voiding spontaneously and adequately   EXTREM: WWP, no edema, SCDs in place  NEURO: No focal deficits  I&O's Summary    26 Sep 2023 07:01  -  27 Sep 2023 07:00  --------------------------------------------------------  IN: 3456.6 mL / OUT: 3845 mL / NET: -388.4 mL    27 Sep 2023 07:01  -  27 Sep 2023 14:08  --------------------------------------------------------  IN: 498 mL / OUT: 855 mL / NET: -357 mL        LABS:                        9.0    7.85  )-----------( 207      ( 27 Sep 2023 05:43 )             28.4     09-27    135  |  103  |  33<H>  ----------------------------<  113<H>  4.2   |  26  |  1.23    Ca    8.5      27 Sep 2023 05:43  Phos  3.9     09-27  Mg     2.1     09-27    TPro  7.7  /  Alb  2.3<L>  /  TBili  3.0<H>  /  DBili  2.0<H>  /  AST  70<H>  /  ALT  63<H>  /  AlkPhos  151<H>  09-27      Urinalysis Basic - ( 27 Sep 2023 05:43 )    Color: x / Appearance: x / SG: x / pH: x  Gluc: 113 mg/dL / Ketone: x  / Bili: x / Urobili: x   Blood: x / Protein: x / Nitrite: x   Leuk Esterase: x / RBC: x / WBC x   Sq Epi: x / Non Sq Epi: x / Bacteria: x      CAPILLARY BLOOD GLUCOSE      POCT Blood Glucose.: 125 mg/dL (27 Sep 2023 11:45)  POCT Blood Glucose.: 114 mg/dL (27 Sep 2023 06:47)  POCT Blood Glucose.: 114 mg/dL (27 Sep 2023 00:35)  POCT Blood Glucose.: 75 mg/dL (26 Sep 2023 16:21)    LIVER FUNCTIONS - ( 27 Sep 2023 05:43 )  Alb: 2.3 g/dL / Pro: 7.7 g/dL / ALK PHOS: 151 U/L / ALT: 63 U/L / AST: 70 U/L / GGT: x             RADIOLOGY & ADDITIONAL STUDIES:

## 2023-09-27 NOTE — CHART NOTE - NSCHARTNOTEFT_GEN_A_CORE
78 M w/ Crohn's, AFib/Flutter s/p DCCVs on amiodarone, remote ileocectomy and open appendectomy. Admitted (6/23) for SBO vs Crohns flare, s/p NGT decompression and s/p lap TRE converted to open TRE, SBR x 3, left in discontinuity with abthera vac on (6/27), RTOR for ileocolic resection, small bowel anastomosis, and abdominal wall closure on (6/28), c/b fluid collection s/p IR aspiration of perihepatic fluid on (7/3), c/b wound dehiscence s/p RTOR exlap, washout, ileocolic resection with end ileostomy, blow hole colostomy, red rubber from ileostomy to small bowel anastomosis; vicryl bridging mesh on (7/5) transferred to SICU postoperatively for hemodynamic monitoring, with hospital course complicated by periods of AMS which resolved but now stepped back up for to SICU on 9/10 for acute AMS, intermittent hypoglycemia, AFib with RVR. CT abd 9/10 showing distended gallbladder with sludge. SP IR percutaneous cholecystostomy placement 9/11/23.    Perc cony: output 700 ml fluid/24h (prior 24h 385ml). Dressing c/d/i    Recommendations:  -Continue to monitor output  -IR to follow, will see patient tomorrow 9/28 78 M w/ Crohn's, AFib/Flutter s/p DCCVs on amiodarone, remote ileocectomy and open appendectomy. Admitted (6/23) for SBO vs Crohns flare, s/p NGT decompression and s/p lap TRE converted to open TRE, SBR x 3, left in discontinuity with abthera vac on (6/27), RTOR for ileocolic resection, small bowel anastomosis, and abdominal wall closure on (6/28), c/b fluid collection s/p IR aspiration of perihepatic fluid on (7/3), c/b wound dehiscence s/p RTOR exlap, washout, ileocolic resection with end ileostomy, blow hole colostomy, red rubber from ileostomy to small bowel anastomosis; vicryl bridging mesh on (7/5) transferred to SICU postoperatively for hemodynamic monitoring, with hospital course complicated by periods of AMS which resolved but now stepped back up for to SICU on 9/10 for acute AMS, intermittent hypoglycemia, AFib with RVR. CT abd 9/10 showing distended gallbladder with sludge. SP IR percutaneous cholecystostomy placement 9/11/23.    Perc cony: output 700 ml fluid/24h (prior 24h 385ml). Dressing c/d/i.    -Continue to monitor output  -IR to follow, will see patient tomorrow 9/28

## 2023-09-27 NOTE — PROGRESS NOTE ADULT - SUBJECTIVE AND OBJECTIVE BOX
INTERVAL/OVERNIGHT EVENTS: Short run of tachycardia up to 170's following PT. Resolved spontaneously.    SUBJECTIVE: Patient seen resting comfortably in bed. Denies nausea, vomiting, headache, chest pain, abdominal pain.     Neurologic Medications  melatonin 5 milliGRAM(s) Oral at bedtime  ondansetron Injectable 4 milliGRAM(s) IV Push every 8 hours PRN Nausea and/or Vomiting    Cardiovascular Medications  amLODIPine   Tablet 5 milliGRAM(s) Oral daily  metoprolol tartrate Injectable 5 milliGRAM(s) IV Push every 6 hours    Gastrointestinal Medications  dextrose 5%. 1000 milliLiter(s) IV Continuous <Continuous>  dextrose 5%. 1000 milliLiter(s) IV Continuous <Continuous>  lipid, fat emulsion (Fish Oil and Plant Based) 20% Infusion 1 Gm/kG/Day IV Continuous <Continuous>  loperamide 4 milliGRAM(s) Oral every 6 hours  Parenteral Nutrition - Adult 1 Each TPN Continuous <Continuous>  Parenteral Nutrition - Adult 1 Each TPN Continuous <Continuous>    Hematologic/Oncologic Medications  heparin   Injectable 5000 Unit(s) SubCutaneous every 8 hours    Antimicrobial/Immunologic Medications  erythromycin   IVPB 250 milliGRAM(s) IV Intermittent every 6 hours    Endocrine/Metabolic Medications  dextrose 50% Injectable 25 Gram(s) IV Push once  dextrose 50% Injectable 12.5 Gram(s) IV Push once  dextrose 50% Injectable 25 Gram(s) IV Push once  dextrose Oral Gel 15 Gram(s) Oral once PRN Blood Glucose LESS THAN 70 milliGRAM(s)/deciliter  glucagon  Injectable 1 milliGRAM(s) IntraMuscular once  insulin lispro (ADMELOG) corrective regimen sliding scale   SubCutaneous every 6 hours  levothyroxine Injectable 40 MICROGram(s) IV Push at bedtime    Topical/Other Medications  benzocaine/menthol Lozenge 2 Lozenge Oral every 4 hours PRN Sore Throat  chlorhexidine 2% Cloths 1 Application(s) Topical <User Schedule>  nystatin Powder 1 Application(s) Topical three times a day      MEDICATIONS  (PRN):  benzocaine/menthol Lozenge 2 Lozenge Oral every 4 hours PRN Sore Throat  dextrose Oral Gel 15 Gram(s) Oral once PRN Blood Glucose LESS THAN 70 milliGRAM(s)/deciliter  ondansetron Injectable 4 milliGRAM(s) IV Push every 8 hours PRN Nausea and/or Vomiting      I&O's Detail    26 Sep 2023 07:01  -  27 Sep 2023 07:00  --------------------------------------------------------  IN:    Fat Emulsion (Fish Oil &amp; Plant Based) 20% Infusion: 457.6 mL    IV PiggyBack: 1000 mL    Oral Fluid: 90 mL    TPN (Total Parenteral Nutrition): 1909 mL  Total IN: 3456.6 mL    OUT:    Bulb (mL): 40 mL    Drain (mL): 700 mL    Drain (mL): 30 mL    Drain (mL): 275 mL    Fat Emulsion (Fish Oil &amp; Plant Based) 20% Infusion: 0 mL    Voided (mL): 2800 mL  Total OUT: 3845 mL    Total NET: -388.4 mL      27 Sep 2023 07:01  -  27 Sep 2023 13:14  --------------------------------------------------------  IN:    TPN (Total Parenteral Nutrition): 498 mL  Total IN: 498 mL    OUT:    Drain (mL): 30 mL    Drain (mL): 50 mL    Voided (mL): 200 mL  Total OUT: 280 mL    Total NET: 218 mL          Vital Signs Last 24 Hrs  T(C): 36.4 (27 Sep 2023 09:00), Max: 37.4 (27 Sep 2023 01:00)  T(F): 97.5 (27 Sep 2023 09:00), Max: 99.3 (27 Sep 2023 01:00)  HR: 79 (27 Sep 2023 12:00) (77 - 174)  BP: 125/62 (27 Sep 2023 12:00) (116/60 - 168/77)  BP(mean): 85 (27 Sep 2023 12:00) (81 - 105)  RR: 25 (27 Sep 2023 12:00) (11 - 29)  SpO2: 100% (27 Sep 2023 12:00) (95% - 100%)    Parameters below as of 27 Sep 2023 13:00  Patient On (Oxygen Delivery Method): room air        GENERAL: NAD, resting comfortably in bed  HEENT: NCAT, MMM  C/V: Normal rate, normal peripheral perfusion, no murmurs  PULM: Nonlabored breathing, no respiratory distress, CTA  ABD: Soft, ND, NT, no rebound tenderness, no guarding, wound manager in place holding good seal, feculent output in bag  EXTREM: WWP, no edema, SCDs in place  NEURO: No focal deficits    LABS:                        9.0    7.85  )-----------( 207      ( 27 Sep 2023 05:43 )             28.4     09-27    135  |  103  |  33<H>  ----------------------------<  113<H>  4.2   |  26  |  1.23    Ca    8.5      27 Sep 2023 05:43  Phos  3.9     09-27  Mg     2.1     09-27    TPro  7.7  /  Alb  2.3<L>  /  TBili  3.0<H>  /  DBili  2.0<H>  /  AST  70<H>  /  ALT  63<H>  /  AlkPhos  151<H>  09-27      Urinalysis Basic - ( 27 Sep 2023 05:43 )    Color: x / Appearance: x / SG: x / pH: x  Gluc: 113 mg/dL / Ketone: x  / Bili: x / Urobili: x   Blood: x / Protein: x / Nitrite: x   Leuk Esterase: x / RBC: x / WBC x   Sq Epi: x / Non Sq Epi: x / Bacteria: x        RADIOLOGY & ADDITIONAL STUDIES:

## 2023-09-27 NOTE — PROGRESS NOTE ADULT - ASSESSMENT
78M w/ Crohn's, AFib/Flutter s/p DCCVs on amiodarone, remote ileocectomy and open appy here for SBO vs Crohns flare, s/p NGT decompression and now s/p lap TRE converted to open TRE, SBR x 3, left in discontinuity with abthera vac on 6/27, RTOR for ileocolic resection, small bowel anastomosis, and abdominal wall closure on 6/28, and s/p IR aspiration of perihepatic fluid on 7/3, stepped down to telemetry on 7/4. wound dehiscence on 7/5, RTOR ex-lap, washout, ileocolic resection with end ileostomy, blow hole colostomy, red rubber from ileostomy to small bowel anastomosis; Vicryl bridging mesh on 7/5. Transferred to SICU post op for HD monitoring. Extubated 7/6. Surgical wound with decreasing in size and granulating with Vac. Tentative plan for skin graft per Plastics; timing TBD pending proper wound shrinkage and granulation   8/23: Upgraded to SICU 8/23 for acute delirium and tremors, r/o sepsis vs metabolic encephalopathy. Ammonia levels normal    8/25: Spiked temp to 101.3 overnight w/ new leukocytosis to 14   8/28: Downgraded from SICU. No growth from blood cultures. Abx x 5 days until 8/29 8/30: Leukocytosis resolved   9/1: Decreased UOP, soft pressures. Cr 3.08 from 1.12. C/f dehydration 2/2 high fistula output vs ELVI   9/2: Upgraded to SICU for worsening ELVI. Stepped down 9/6   9/10: Upgraded to SICU for AMS, hypoglycemia and A-fib.   9/11: In setting of transaminitis, hyperbilirubinemia, leukocytosis and GB distention, underwent IR perc cony     Hemodynamically normal and afebrile. Low fistula output. Skin graft will not be feasible per plastics given position of fistula/ostomy over wound bed.     CT A/P 9/20 unremarkable   AXR 9/20 showed contrast pooling in stomach c/f gastroparesis. On Erythromycin. Nausea resolved   Persistent mild hyperbilirubinemia and transaminitis     Plan:   - Continue Loperamide and Octreotide; hold off on Lomotil    - Appreciate wound care management twice weekly  - NPO/IVF/TPN   - Monitor perc cony, ostomy and JOYCE drain output   - PRN pain and nausea control   - Hx of A-fib/flutter; continue Lopressor   - DVT chemo & mechanical ppx   - OOB/PT     D/w SICU, chief resident and attending

## 2023-09-27 NOTE — PROGRESS NOTE ADULT - SUBJECTIVE AND OBJECTIVE BOX
SUBJECTIVE:  Patient seen and examined at bedside this AM   No acute events overnight. Wound manager changed yesterday  Patient well appearing with no complaints or concerns   Denied nausea or emesis  Voiding spontaneously       MEDICATIONS  (STANDING):  amLODIPine   Tablet 5 milliGRAM(s) Oral daily  chlorhexidine 2% Cloths 1 Application(s) Topical <User Schedule>  dextrose 5%. 1000 milliLiter(s) (100 mL/Hr) IV Continuous <Continuous>  dextrose 5%. 1000 milliLiter(s) (50 mL/Hr) IV Continuous <Continuous>  dextrose 50% Injectable 25 Gram(s) IV Push once  dextrose 50% Injectable 12.5 Gram(s) IV Push once  dextrose 50% Injectable 25 Gram(s) IV Push once  erythromycin   IVPB 250 milliGRAM(s) IV Intermittent every 6 hours  glucagon  Injectable 1 milliGRAM(s) IntraMuscular once  heparin   Injectable 5000 Unit(s) SubCutaneous every 8 hours  insulin lispro (ADMELOG) corrective regimen sliding scale   SubCutaneous every 6 hours  levothyroxine Injectable 40 MICROGram(s) IV Push at bedtime  lipid, fat emulsion (Fish Oil and Plant Based) 20% Infusion 1 Gm/kG/Day (41.67 mL/Hr) IV Continuous <Continuous>  loperamide 4 milliGRAM(s) Oral every 6 hours  melatonin 5 milliGRAM(s) Oral at bedtime  metoprolol tartrate Injectable 5 milliGRAM(s) IV Push every 6 hours  nystatin Powder 1 Application(s) Topical three times a day  Parenteral Nutrition - Adult 1 Each (83 mL/Hr) TPN Continuous <Continuous>    MEDICATIONS  (PRN):  benzocaine/menthol Lozenge 2 Lozenge Oral every 4 hours PRN Sore Throat  dextrose Oral Gel 15 Gram(s) Oral once PRN Blood Glucose LESS THAN 70 milliGRAM(s)/deciliter  ondansetron Injectable 4 milliGRAM(s) IV Push every 8 hours PRN Nausea and/or Vomiting      Vital Signs Last 24 Hrs  T(C): 37.3 (27 Sep 2023 06:00), Max: 37.4 (27 Sep 2023 01:00)  T(F): 99.1 (27 Sep 2023 06:00), Max: 99.3 (27 Sep 2023 01:00)  HR: 80 (27 Sep 2023 06:01) (77 - 174)  BP: 158/72 (27 Sep 2023 06:01) (116/60 - 168/77)  BP(mean): 104 (27 Sep 2023 06:01) (79 - 105)  RR: 16 (27 Sep 2023 06:01) (11 - 26)  SpO2: 96% (27 Sep 2023 06:01) (95% - 100%)    Parameters below as of 27 Sep 2023 06:01  Patient On (Oxygen Delivery Method): room air        Physical Exam:  GENERAL: NAD, resting comfortably in bed  HEENT: NCAT, MMM  C/V: Normal rate, normal peripheral perfusion, no murmurs  PULM: Nonlabored breathing, no respiratory distress in room aur  ABD: Soft, ND, NT, no rebound tenderness, no guarding. Wound manager over midline wound with fistula/ostomy. RUQ ileostomy with minimal output, perc cony with bilious output. JOYCE in LUQ with minimal output. Fistula/ostomy with low output   : Voiding spontaneously and adequately   EXTREM: WWP, no edema, SCDs in place  NEURO: No focal deficits      I&O's Summary    25 Sep 2023 07:01  -  26 Sep 2023 07:00  --------------------------------------------------------  IN: 3691.2 mL / OUT: 3935 mL / NET: -243.8 mL    26 Sep 2023 07:01  -  27 Sep 2023 06:44  --------------------------------------------------------  IN: 3290.6 mL / OUT: 2905 mL / NET: 385.6 mL        LABS:                        9.0    7.85  )-----------( 207      ( 27 Sep 2023 05:43 )             28.4     09-27    135  |  103  |  33<H>  ----------------------------<  113<H>  4.2   |  26  |  1.23    Ca    8.5      27 Sep 2023 05:43  Phos  3.9     09-27  Mg     2.1     09-27    TPro  7.7  /  Alb  2.3<L>  /  TBili  3.0<H>  /  DBili  2.0<H>  /  AST  70<H>  /  ALT  63<H>  /  AlkPhos  151<H>  09-27      Urinalysis Basic - ( 27 Sep 2023 05:43 )    Color: x / Appearance: x / SG: x / pH: x  Gluc: 113 mg/dL / Ketone: x  / Bili: x / Urobili: x   Blood: x / Protein: x / Nitrite: x   Leuk Esterase: x / RBC: x / WBC x   Sq Epi: x / Non Sq Epi: x / Bacteria: x      CAPILLARY BLOOD GLUCOSE      POCT Blood Glucose.: 114 mg/dL (27 Sep 2023 00:35)  POCT Blood Glucose.: 75 mg/dL (26 Sep 2023 16:21)  POCT Blood Glucose.: 105 mg/dL (26 Sep 2023 12:30)    LIVER FUNCTIONS - ( 27 Sep 2023 05:43 )  Alb: 2.3 g/dL / Pro: 7.7 g/dL / ALK PHOS: 151 U/L / ALT: 63 U/L / AST: 70 U/L / GGT: x             RADIOLOGY & ADDITIONAL STUDIES:

## 2023-09-27 NOTE — CHART NOTE - NSCHARTNOTEFT_GEN_A_CORE
Admitting Diagnosis:   Patient is a 78y old  Male who presents with a chief complaint of SBO (26 Sep 2023 12:38)      PAST MEDICAL & SURGICAL HISTORY:  Essential hypertension  Hypertension      Atrial fibrillation  s/p cardioversion 2010 and 2014  Pt. reports 4 DCCV  Now on Amiodarone 200mg PO bid and Eliquis 5mg PO bid  Last DCCV 4 yrs ago at [facility]      Crohn's disease  s/p partial resection of ileum      Hyperlipidemia      Hypothyroidism      History of depression  On Venlafaxine ER 150mg PO bid      Junctional rhythm      H/O knee surgery      History of cataract surgery          Current Nutrition Order: Order: NPO; TPN via PICC- 365g Dex, 125g AA, 100g SMOF lipids [2741 kcals, 125g protein, GIR 2.80 mg/kg/min]    PO Intake: Good (%) [   ]  Fair (50-75%) [   ] Poor (<25%) [   ]- N/A    GI Issues:     Pain:    Skin Integrity:  R heel DTI, LUQ JOYCE, mid abd wound, EC fistula, ileostomy, L forearm skin tear. Rudy score 18    Labs:   09-27    135  |  103  |  33<H>  ----------------------------<  113<H>  4.2   |  26  |  1.23    Ca    8.5      27 Sep 2023 05:43  Phos  3.9     09-27  Mg     2.1     09-27    TPro  7.7  /  Alb  2.3<L>  /  TBili  3.0<H>  /  DBili  2.0<H>  /  AST  70<H>  /  ALT  63<H>  /  AlkPhos  151<H>  09-27    CAPILLARY BLOOD GLUCOSE      POCT Blood Glucose.: 114 mg/dL (27 Sep 2023 06:47)  POCT Blood Glucose.: 114 mg/dL (27 Sep 2023 00:35)  POCT Blood Glucose.: 75 mg/dL (26 Sep 2023 16:21)  POCT Blood Glucose.: 105 mg/dL (26 Sep 2023 12:30)      Medications:  MEDICATIONS  (STANDING):  amLODIPine   Tablet 5 milliGRAM(s) Oral daily  chlorhexidine 2% Cloths 1 Application(s) Topical <User Schedule>  dextrose 5%. 1000 milliLiter(s) (50 mL/Hr) IV Continuous <Continuous>  dextrose 5%. 1000 milliLiter(s) (100 mL/Hr) IV Continuous <Continuous>  dextrose 50% Injectable 25 Gram(s) IV Push once  dextrose 50% Injectable 12.5 Gram(s) IV Push once  dextrose 50% Injectable 25 Gram(s) IV Push once  erythromycin   IVPB 250 milliGRAM(s) IV Intermittent every 6 hours  glucagon  Injectable 1 milliGRAM(s) IntraMuscular once  heparin   Injectable 5000 Unit(s) SubCutaneous every 8 hours  insulin lispro (ADMELOG) corrective regimen sliding scale   SubCutaneous every 6 hours  levothyroxine Injectable 40 MICROGram(s) IV Push at bedtime  lipid, fat emulsion (Fish Oil and Plant Based) 20% Infusion 1 Gm/kG/Day (41.67 mL/Hr) IV Continuous <Continuous>  loperamide 4 milliGRAM(s) Oral every 6 hours  melatonin 5 milliGRAM(s) Oral at bedtime  metoprolol tartrate Injectable 5 milliGRAM(s) IV Push every 6 hours  nystatin Powder 1 Application(s) Topical three times a day  Parenteral Nutrition - Adult 1 Each (83 mL/Hr) TPN Continuous <Continuous>  Parenteral Nutrition - Adult 1 Each (83 mL/Hr) TPN Continuous <Continuous>    MEDICATIONS  (PRN):  benzocaine/menthol Lozenge 2 Lozenge Oral every 4 hours PRN Sore Throat  dextrose Oral Gel 15 Gram(s) Oral once PRN Blood Glucose LESS THAN 70 milliGRAM(s)/deciliter  ondansetron Injectable 4 milliGRAM(s) IV Push every 8 hours PRN Nausea and/or Vomiting    Weight: 90.4kg [21 Sep 2023]  Daily 91.4kg [20 Sep 2023]  Daily 86.7kg [18 Sep 2023]  Daily 88.1kg [16 Sep 2023]  Daily 88.9kg [15 Sep 2023]   Daily 89.1 [14 Sep 2023]  Daily 92.9kg [6 Sep 2023]  Daily 91.8kg [27 Aug 2023]  Daily 101kg [9 Aug 2023]  Daily   102.1kg [10 July 2023]  Daily 99.9kg [9 July 2023]    Weight Change: weight trending down throughout admission. Recommend continue weekly weights for trending / ensuring adequacy of nutrition provision    IBW: 86.4kg   % IBW: 100.3%    Estimated energy needs:   Ideal body weight (86.4kg) used for calculations as pt >100% of IBW, BMI <30 per [facility] Standards of Care. Needs estimated for age and adjusted for current clinical status, increased needs for post-op & open abd wound healing    Calories: 3273-1684 kcals based on 30-35 kcals/kg  Protein: 172.8-216 g protein based on 2.0-2.5g protein/kg  *Fluid needs per team    Subjective:   78M w/ Crohn's, AFib/Flutter s/p DCCVs on amiodarone, remote ileocectomy and open appy here for SBO vs Crohns flare, s/p NGT decompression and now s/p lap TRE converted to open TRE, SBR x 3, left in discontinuity with abthera vac on 6/27, RTOR for ileocolic resection, small bowel anastomosis, and abdominal wall closure on 6/28, and s/p IR aspiration of perihepatic fluid on 7/3, stepped down to telemetry on 7/4. wound dehiscence on 7/5, RTOR ex-lap, washout, ileocolic resection with end ileostomy, blow hole colostomy, red rubber from ileostomy to small bowel anastomosis; Vicryl bridging mesh on 7/5. Transferred to SICU post op for HD monitoring. Extubated 7/6 and SDU 8/2. Has been recovering on telemetry with ECF management and prolonged TPN. Upgraded to SICU 8/23 for acute delirium and tremors, r/o sepsis vs metabolic encephalopathy. Ammonia levels normal. TPN DC'ed and started on PO diet. Stepped down to 8 Lachman on 8/28.    Previous Nutrition Diagnosis:  Increased Nutrient Needs R/T physiological demands for nutrient AEB post-op wound healing    Active [ X  ]  Resolved [   ]    If resolved, new PES:     Goal: Pt will meet at least 75% of protein & energy needs via most appropriate route for nutrition     Recommendations:    Education:     Risk Level: High [   ] Moderate [   ] Low [   ] Admitting Diagnosis:   Patient is a 78y old  Male who presents with a chief complaint of SBO (26 Sep 2023 12:38)      PAST MEDICAL & SURGICAL HISTORY:  Essential hypertension  Hypertension      Atrial fibrillation  s/p cardioversion 2010 and 2014  Pt. reports 4 DCCV  Now on Amiodarone 200mg PO bid and Eliquis 5mg PO bid  Last DCCV 4 yrs ago at [facility]      Crohn's disease  s/p partial resection of ileum      Hyperlipidemia      Hypothyroidism      History of depression  On Venlafaxine ER 150mg PO bid      Junctional rhythm      H/O knee surgery      History of cataract surgery          Current Nutrition Order: Order: NPO; TPN via PICC- 365g Dex, 125g AA, 100g SMOF lipids [2741 kcals, 125g protein, GIR 2.89 mg/kg/min]    PO Intake: Good (%) [   ]  Fair (50-75%) [   ] Poor (<25%) [   ]- N/A    GI Issues: abd wound & fistulay, RLQ ostomy, likely delayed gastric emptying per CT    Pain: no pain/discomfort noted    Skin Integrity:  R heel DTI, LUQ JOYCE, mid abd wound, EC fistula, ileostomy, L forearm skin tear. Rudy score 18    Labs:   09-27    135  |  103  |  33<H>  ----------------------------<  113<H>  4.2   |  26  |  1.23    Ca    8.5      27 Sep 2023 05:43  Phos  3.9     09-27  Mg     2.1     09-27    TPro  7.7  /  Alb  2.3<L>  /  TBili  3.0<H>  /  DBili  2.0<H>  /  AST  70<H>  /  ALT  63<H>  /  AlkPhos  151<H>  09-27    CAPILLARY BLOOD GLUCOSE      POCT Blood Glucose.: 114 mg/dL (27 Sep 2023 06:47)  POCT Blood Glucose.: 114 mg/dL (27 Sep 2023 00:35)  POCT Blood Glucose.: 75 mg/dL (26 Sep 2023 16:21)  POCT Blood Glucose.: 105 mg/dL (26 Sep 2023 12:30)      Medications:  MEDICATIONS  (STANDING):  amLODIPine   Tablet 5 milliGRAM(s) Oral daily  chlorhexidine 2% Cloths 1 Application(s) Topical <User Schedule>  dextrose 5%. 1000 milliLiter(s) (50 mL/Hr) IV Continuous <Continuous>  dextrose 5%. 1000 milliLiter(s) (100 mL/Hr) IV Continuous <Continuous>  dextrose 50% Injectable 25 Gram(s) IV Push once  dextrose 50% Injectable 12.5 Gram(s) IV Push once  dextrose 50% Injectable 25 Gram(s) IV Push once  erythromycin   IVPB 250 milliGRAM(s) IV Intermittent every 6 hours  glucagon  Injectable 1 milliGRAM(s) IntraMuscular once  heparin   Injectable 5000 Unit(s) SubCutaneous every 8 hours  insulin lispro (ADMELOG) corrective regimen sliding scale   SubCutaneous every 6 hours  levothyroxine Injectable 40 MICROGram(s) IV Push at bedtime  lipid, fat emulsion (Fish Oil and Plant Based) 20% Infusion 1 Gm/kG/Day (41.67 mL/Hr) IV Continuous <Continuous>  loperamide 4 milliGRAM(s) Oral every 6 hours  melatonin 5 milliGRAM(s) Oral at bedtime  metoprolol tartrate Injectable 5 milliGRAM(s) IV Push every 6 hours  nystatin Powder 1 Application(s) Topical three times a day  Parenteral Nutrition - Adult 1 Each (83 mL/Hr) TPN Continuous <Continuous>  Parenteral Nutrition - Adult 1 Each (83 mL/Hr) TPN Continuous <Continuous>    MEDICATIONS  (PRN):  benzocaine/menthol Lozenge 2 Lozenge Oral every 4 hours PRN Sore Throat  dextrose Oral Gel 15 Gram(s) Oral once PRN Blood Glucose LESS THAN 70 milliGRAM(s)/deciliter  ondansetron Injectable 4 milliGRAM(s) IV Push every 8 hours PRN Nausea and/or Vomiting    Weight: 87.7kg [24 Sep 2023]  Daily 90.4kg [21 Sep 2023]  Daily 91.4kg [20 Sep 2023]  Daily 86.7kg [18 Sep 2023]  Daily 88.1kg [16 Sep 2023]  Daily 88.9kg [15 Sep 2023]   Daily 89.1 [14 Sep 2023]  Daily 92.9kg [6 Sep 2023]  Daily 91.8kg [27 Aug 2023]  Daily 101kg [9 Aug 2023]  Daily   102.1kg [10 July 2023]  Daily 99.9kg [9 July 2023]    Weight Change: weight trending down throughout admission. Recommend continue weekly weights for trending / ensuring adequacy of nutrition provision    IBW: 86.4kg   % IBW: 101.5%    Estimated energy needs:   Ideal body weight (86.4kg) used for calculations as pt >100% of IBW, BMI <30 per [facility] Standards of Care. Needs estimated for age and adjusted for current clinical status, increased needs for post-op & open abd wound healing    Calories: 2132-8699 kcals based on 30-35 kcals/kg  Protein: 172.8-216 g protein based on 2.0-2.5g protein/kg  *Fluid needs per team    Subjective:   78M w/ Crohn's, AFib/Flutter s/p DCCVs on amiodarone, remote ileocectomy and open appy here for SBO vs Crohns flare, s/p NGT decompression and now s/p lap TRE converted to open TRE, SBR x 3, left in discontinuity with abthera vac on 6/27, RTOR for ileocolic resection, small bowel anastomosis, and abdominal wall closure on 6/28, and s/p IR aspiration of perihepatic fluid on 7/3, stepped down to telemetry on 7/4. wound dehiscence on 7/5, RTOR ex-lap, washout, ileocolic resection with end ileostomy, blow hole colostomy, red rubber from ileostomy to small bowel anastomosis; Vicryl bridging mesh on 7/5. Transferred to SICU post op for HD monitoring. Extubated 7/6 and SDU 8/2. Has been recovering on telemetry with ECF management and prolonged TPN. Upgraded to SICU 8/23 for acute delirium and tremors, r/o sepsis vs metabolic encephalopathy. Ammonia levels normal. TPN DC'ed and started on PO diet. Stepped down to 8 Lachman on 8/28.    Chart reviewed. Pt seen with IDT today during AM rounds. Pt remains NPO, TPN as primary means to nutrition. Current provision provides 31.7 kcals/kg, 1.45g protein/kg, 25.9 non-protein kcals/kg IBW. Concern for gastric dysmotility, CT scan showing contrast not progressing past pyloris. Octrotide in TPN for high ostomy and ECF output. Pt denies n/v/abd pain. Plan to continue current nutrition provision, TPN reordered for tonight.   Labs: POC BG trending  mg/dL x 24hrs. BUN 33 <H>, Creat WNL, direct & total bilirubin remain elevated, elevated ALT/AST & Alk phos. triglycerides 113 [9/25]. Meds: on zofran, imodium    Previous Nutrition Diagnosis:  Increased Nutrient Needs R/T physiological demands for nutrient AEB post-op wound healing    Active [ X ]  Resolved [   ]    If resolved, new PES:     Goal: Pt will meet at least 75% of protein & energy needs via most appropriate route for nutrition     Recommendations:  1. Continue NPO; TPN via PICC-  365g Dex, 125g AA, 100g SMOF lipids; provides 2741 kcals, 125g protein, GIR 2.89 mg/kg/min; provides 31.7 kcals/kg, 1.45g protein/kg, 25.9 non-protein kcals/kg IBW  - c/w MVI, B1, Vit C, Zinc in TPN bag; hold copper & manganese  - trend renal function, adjust protein as indicated  - closely monitor feasibility for resuming PO  2. Recommend checking micronutrient levels [copper, manganese, zinc]  3. Fluids & lytes per team  4. Weekly lipid panel  - hold lipids if TG >400  5. Daily BMP, Mg, Phos. POC BG 4-6hrs  - monitor & replenish lytes outside TPN bag  6. Monitor chemistry, GI function, skin integrity  7. To continue close monitoring of clinical course & adjust recommendations prn    Education: deferred    Risk Level: High [  X ] Moderate [   ] Low [   ]

## 2023-09-27 NOTE — PROGRESS NOTE ADULT - ASSESSMENT
78M w/ Crohn's, AFib/Flutter s/p DCCVs on amiodarone, remote ileocectomy and open appy here for SBO vs Crohns flare, s/p NGT decompression and now s/p lap TRE converted to open TRE, SBR x 3, left in discontinuity with abthera vac on 6/27, RTOR for ileocolic resection, small bowel anastomosis, and abdominal wall closure on 6/28, and s/p IR aspiration of perihepatic fluid on 7/3, stepped down to telemetry on 7/4. wound dehiscence on 7/5, RTOR ex-lap, washout, ileocolic resection with end ileostomy, blow hole colostomy, red rubber from ileostomy to small bowel anastomosis; Vicryl bridging mesh on 7/5. Transferred to SICU post op for HD monitoring. Extubated 7/6. Surgical wound with decreasing in size and granulating with Vac. Tentative plan for skin graft per Plastics; timing TBD pending proper wound shrinkage and granulation     Plan:     - Appreciate wound care management; wound manager changes Tues/ Fri   - NPO/IVF/TPN   - Monitor perc cony, ostomy and JOYCE drain output   - PRN pain and nausea control   - Hx of A-fib/flutter; continue Lopressor   - DVT chemo & mechanical ppx   - OOB/PT

## 2023-09-28 NOTE — PROGRESS NOTE ADULT - ASSESSMENT
78 M w/ Crohn's, AFib/Flutter s/p DCCVs on amiodarone, remote ileocectomy and open appendectomy. Admitted (6/23) for SBO vs Crohns flare, s/p NGT decompression and s/p lap TRE converted to open TRE, SBR x 3, left in discontinuity with abthera vac on (6/27), RTOR for ileocolic resection, small bowel anastomosis, and abdominal wall closure on (6/28), c/b fluid collection s/p IR aspiration of perihepatic fluid on (7/3), c/b wound dehiscence s/p RTOR exlap, washout, ileocolic resection with end ileostomy, blow hole colostomy, red rubber from ileostomy to small bowel anastomosis; vicryl bridging mesh on (7/5) transferred to SICU postoperatively for hemodynamic monitoring, with hospital course complicated by periods of AMS most recently on 9/1 and associated with oliguria, severe ELVI and hyponatremia, which resolved but now stepped back up for to SICU on 9/10 for acute AMS, intermittent hypoglycemia, AFib with RVR. Perc Choley tube placed on 9/11 for enlarged GB. Apparent episode of PSVT <1 min yesterday while ambulating with PT, resolved without medication; prescribed metoprolol 5mg qd for rate control.    NEURO: AMS (resolved), EEG neg. Hx of depression - effexor Dc'd on 9/10. Pain control with Dilaudid for vac changes only. Cont Melatonin prn Insomnia, Cont Zofran. Integrative (pet) therapy ordered for patient comfort  HENT: Cepacol PRN for throat pain  CV: Hx Afib/flutter: s/p DCCV, Off amiodarone due to elevated transaminases and cocern for medication induced hepatotoxicity; Continue Metoprolol 5q6. Hx HTN - Holding Losartan. Hx HLD: hold Lipitor given LFTs. TTE  (7/18) - PASP 64mmHg, EF 65-70%, HTN: started norvasc 5qd. Metoprolol 5mg qd for rate control due to apparent PSVT yesterday; can increase to 7.5 mg qd if episodes recur; EP consulted with recommendation for the same.  PULM: no resp distress on room air.   GI/FEN: CT suspect delayed gastric emptying, on erythromycin for motility; consider stopping erythromycin or halving dose. Cont NPO, cont Imodium, Holding Lomotil. Cont Octreotide in TPN for high ostomy and ECF output. PICC/TPN (Lipids 100gm, Dex@27/kg, AA @1.6). Transaminases improved from previously; of unknown origin, now stable. s/p Perc Choley on 9/11. PPI.   : Voids, S/p ELVI resolved.  ENDO: mISS. Hx hypothyroid: IV Synthroid, TSH wnl on 9/10  ID: Numerous SICU admissions for sepsis. Currently off abx. Cont Nystatin powder   // PREVIOUSLY had C. tertium, Lactobacillis from his IR cx 7/3, and candida albicans, lactobacillus, vanc sensitive E faecium, vanc resistant E gallinarum, vanc resistant E casseliflavus, lactobacillus from his OR cx 7/5. Completed course of abx with Imipenem (9/10-9/15). Imipenem (8/26--) imipenem (6/30-7/12, 7/23-7/30), Dapto (6/30-7/5 and 7/23-7/24).  empiric dapto (8/23-24) and cefepime (8/23-24).   PPX: SCDs, SQH  LINES: PIVs, LUE PICC (9/1- )  WOUNDS/DRAINS: Abdominal wound and fistula with wound manager in place. Rt LQ Ostomy. JOYCE.   PT: Ambulate as tolerated

## 2023-09-28 NOTE — PROGRESS NOTE ADULT - SUBJECTIVE AND OBJECTIVE BOX
SUBJECTIVE:   Patient seen and examined at bedside this AM   No acute events overnight   No complaints or concerns   Denied nausea or emesis  Voiding spontaneously      MEDICATIONS  (STANDING):  amLODIPine   Tablet 5 milliGRAM(s) Oral daily  chlorhexidine 2% Cloths 1 Application(s) Topical <User Schedule>  dextrose 5%. 1000 milliLiter(s) (50 mL/Hr) IV Continuous <Continuous>  dextrose 5%. 1000 milliLiter(s) (100 mL/Hr) IV Continuous <Continuous>  dextrose 50% Injectable 25 Gram(s) IV Push once  dextrose 50% Injectable 12.5 Gram(s) IV Push once  dextrose 50% Injectable 25 Gram(s) IV Push once  erythromycin   IVPB 250 milliGRAM(s) IV Intermittent every 6 hours  glucagon  Injectable 1 milliGRAM(s) IntraMuscular once  heparin   Injectable 5000 Unit(s) SubCutaneous every 8 hours  insulin lispro (ADMELOG) corrective regimen sliding scale   SubCutaneous every 6 hours  levothyroxine Injectable 40 MICROGram(s) IV Push at bedtime  lipid, fat emulsion (Fish Oil and Plant Based) 20% Infusion 1 Gm/kG/Day (41.67 mL/Hr) IV Continuous <Continuous>  loperamide 4 milliGRAM(s) Oral every 6 hours  melatonin 5 milliGRAM(s) Oral at bedtime  metoprolol tartrate Injectable 5 milliGRAM(s) IV Push every 6 hours  nystatin Powder 1 Application(s) Topical three times a day  Parenteral Nutrition - Adult 1 Each (83 mL/Hr) TPN Continuous <Continuous>    MEDICATIONS  (PRN):  benzocaine/menthol Lozenge 2 Lozenge Oral every 4 hours PRN Sore Throat  dextrose Oral Gel 15 Gram(s) Oral once PRN Blood Glucose LESS THAN 70 milliGRAM(s)/deciliter  ondansetron Injectable 4 milliGRAM(s) IV Push every 8 hours PRN Nausea and/or Vomiting      Vital Signs Last 24 Hrs  T(C): 36.8 (28 Sep 2023 01:16), Max: 37 (27 Sep 2023 17:43)  T(F): 98.2 (28 Sep 2023 01:16), Max: 98.6 (27 Sep 2023 17:43)  HR: 81 (28 Sep 2023 06:00) (77 - 83)  BP: 156/70 (28 Sep 2023 06:00) (117/53 - 178/76)  BP(mean): 101 (28 Sep 2023 06:00) (76 - 109)  RR: 16 (28 Sep 2023 06:00) (12 - 36)  SpO2: 96% (28 Sep 2023 06:00) (95% - 100%)    Parameters below as of 28 Sep 2023 06:00  Patient On (Oxygen Delivery Method): room air      Physical Exam:  GENERAL: NAD, resting comfortably in bed  HEENT: NCAT, MMM  C/V: Normal rate, normal peripheral perfusion, no murmurs  PULM: Nonlabored breathing, no respiratory distress in room aur  ABD: Soft, ND, NT, no rebound tenderness, no guarding. Wound manager over midline wound with fistula/ostomy. RUQ ileostomy with minimal output (0/35 cc), perc cony with bilious output (25/305 cc). JOYCE in LUQ with minimal output (20/40 cc). LUQ fistula/ostomy (100/530 cc)   : Voiding spontaneously and adequately   EXTREM: WWP, no edema, SCDs in place  NEURO: No focal deficits      I&O's Summary    26 Sep 2023 07:01  -  27 Sep 2023 07:00  --------------------------------------------------------  IN: 3456.6 mL / OUT: 3845 mL / NET: -388.4 mL    27 Sep 2023 07:01  -  28 Sep 2023 06:46  --------------------------------------------------------  IN: 2777.6 mL / OUT: 2885 mL / NET: -107.4 mL        LABS:                        9.0    7.85  )-----------( 207      ( 27 Sep 2023 05:43 )             28.4     09-27    135  |  103  |  33<H>  ----------------------------<  113<H>  4.2   |  26  |  1.23    Ca    8.5      27 Sep 2023 05:43  Phos  3.9     09-27  Mg     2.1     09-27    TPro  7.7  /  Alb  2.3<L>  /  TBili  3.0<H>  /  DBili  2.0<H>  /  AST  70<H>  /  ALT  63<H>  /  AlkPhos  151<H>  09-27      Urinalysis Basic - ( 27 Sep 2023 05:43 )    Color: x / Appearance: x / SG: x / pH: x  Gluc: 113 mg/dL / Ketone: x  / Bili: x / Urobili: x   Blood: x / Protein: x / Nitrite: x   Leuk Esterase: x / RBC: x / WBC x   Sq Epi: x / Non Sq Epi: x / Bacteria: x      CAPILLARY BLOOD GLUCOSE      POCT Blood Glucose.: 108 mg/dL (27 Sep 2023 23:44)  POCT Blood Glucose.: 108 mg/dL (27 Sep 2023 17:30)  POCT Blood Glucose.: 125 mg/dL (27 Sep 2023 11:45)  POCT Blood Glucose.: 114 mg/dL (27 Sep 2023 06:47)    LIVER FUNCTIONS - ( 27 Sep 2023 05:43 )  Alb: 2.3 g/dL / Pro: 7.7 g/dL / ALK PHOS: 151 U/L / ALT: 63 U/L / AST: 70 U/L / GGT: x             RADIOLOGY & ADDITIONAL STUDIES:

## 2023-09-28 NOTE — PROGRESS NOTE ADULT - ASSESSMENT
The patient is a 78 M w/ Crohn's, AFib/Flutter s/p DCCVs on amiodarone, remote ileocectomy and open appendectomy. Admitted (6/23) for SBO vs Crohns flare, s/p NGT decompression and s/p lap TRE converted to open TRE, SBR x 3, left in discontinuity with abthera vac on (6/27), RTOR for ileocolic resection, small bowel anastomosis, and abdominal wall closure on (6/28), c/b fluid collection s/p IR aspiration of perihepatic fluid on (7/3), c/b wound dehiscence s/p RTOR exlap, washout, ileocolic resection with end ileostomy, blow hole colostomy, red rubber from ileostomy to small bowel anastomosis; vicryl bridging mesh on (7/5) transferred to SICU postoperatively for hemodynamic monitoring, with hospital course complicated by periods of AMS most recently on 9/1 and associated with oliguria, severe ELVI and hyponatremia, which resolved but now stepped back up for to SICU on 9/10 for acute AMS, intermittent hypoglycemia, AFib with RVR. Perc Choley tube placed on 9/11 for enlarged GB.     Recommendations  - Appreciate wound care management; wound manager changes Tues/ Fri   - NPO/IVF/TPN   - Monitor perc cony, ostomy and JOYCE drain output - bolus prn   - PRN pain and nausea control   - Hx of A-fib/flutter; continue Lopressor, appreciate EP recs  - Care per primary and SICU  Surgery Team 4C will continue to follow. Please page Team 4 with questions/clinical changes. [phone]

## 2023-09-28 NOTE — CHART NOTE - NSCHARTNOTEFT_GEN_A_CORE
78 M w/ Crohn's, AFib/Flutter s/p DCCVs on amiodarone, remote ileocectomy and open appendectomy. Admitted (6/23) for SBO vs Crohns flare, s/p NGT decompression and s/p lap TRE converted to open TRE, SBR x 3, left in discontinuity with abthera vac on (6/27), RTOR for ileocolic resection, small bowel anastomosis, and abdominal wall closure on (6/28), c/b fluid collection s/p IR aspiration of perihepatic fluid on (7/3), c/b wound dehiscence s/p RTOR exlap, washout, ileocolic resection with end ileostomy, blow hole colostomy, red rubber from ileostomy to small bowel anastomosis; vicryl bridging mesh on (7/5) transferred to SICU postoperatively for hemodynamic monitoring, with hospital course complicated by periods of AMS which resolved but now stepped back up for to SICU on 9/10 for acute AMS, intermittent hypoglycemia, AFib with RVR. CT abd 9/10 showing distended gallbladder with sludge. SP IR percutaneous cholecystostomy placement 9/11/23.    Perc cony: output 525 ml /24h (prior 24h 700ml). Dressing c/d/i. Flushed easily with 2cc NS.     -Continue to monitor output.  -IR to follow. 78 M w/ Crohn's, AFib/Flutter s/p DCCVs on amiodarone, remote ileocectomy and open appendectomy. Admitted (6/23) for SBO vs Crohns flare, s/p NGT decompression and s/p lap TRE converted to open TRE, SBR x 3, left in discontinuity with abthera vac on (6/27), RTOR for ileocolic resection, small bowel anastomosis, and abdominal wall closure on (6/28), c/b fluid collection s/p IR aspiration of perihepatic fluid on (7/3), c/b wound dehiscence s/p RTOR exlap, washout, ileocolic resection with end ileostomy, blow hole colostomy, red rubber from ileostomy to small bowel anastomosis; vicryl bridging mesh on (7/5) transferred to SICU postoperatively for hemodynamic monitoring, with hospital course complicated by periods of AMS which resolved but now stepped back up for to SICU on 9/10 for acute AMS, intermittent hypoglycemia, AFib with RVR. CT abd 9/10 showing distended gallbladder with sludge. S/P IR percutaneous cholecystostomy placement 9/11/23.    Perc cony:  525 ml bilious output/24h (prior 24h 700ml). Dressing c/d/i. Flushed easily with 2cc NS.     -Continue to monitor output.  -IR to follow.

## 2023-09-28 NOTE — PROGRESS NOTE ADULT - ASSESSMENT
78M w/ Crohn's, AFib/Flutter s/p DCCVs on amiodarone, remote ileocectomy and open appy here for SBO vs Crohns flare, s/p NGT decompression and now s/p lap TRE converted to open TRE, SBR x 3, left in discontinuity with abthera vac on 6/27, RTOR for ileocolic resection, small bowel anastomosis, and abdominal wall closure on 6/28, and s/p IR aspiration of perihepatic fluid on 7/3, stepped down to telemetry on 7/4. wound dehiscence on 7/5, RTOR ex-lap, washout, ileocolic resection with end ileostomy, blow hole colostomy, red rubber from ileostomy to small bowel anastomosis; Vicryl bridging mesh on 7/5. Transferred to SICU post op for HD monitoring. Extubated 7/6. Surgical wound with decreasing in size and granulating with Vac. Tentative plan for skin graft per Plastics; timing TBD pending proper wound shrinkage and granulation   8/23: Upgraded to SICU 8/23 for acute delirium and tremors, r/o sepsis vs metabolic encephalopathy. Ammonia levels normal    8/25: Spiked temp to 101.3 overnight w/ new leukocytosis to 14   8/28: Downgraded from SICU. No growth from blood cultures. Abx x 5 days until 8/29 8/30: Leukocytosis resolved   9/1: Decreased UOP, soft pressures. Cr 3.08 from 1.12. C/f dehydration 2/2 high fistula output vs ELVI   9/2: Upgraded to SICU for worsening ELVI. Stepped down 9/6   9/10: Upgraded to SICU for AMS, hypoglycemia and A-fib.   9/11: In setting of transaminitis, hyperbilirubinemia, leukocytosis and GB distention, underwent IR perc cony     Hemodynamically normal and afebrile. Low fistula output. Skin graft will not be feasible per plastics given position of fistula/ostomy over wound bed.     CT A/P 9/20 unremarkable   AXR 9/20 showed contrast pooling in stomach c/f gastroparesis. On Erythromycin. Nausea resolved     Plan:   - Continue Loperamide and Octreotide; hold off on Lomotil    - Appreciate wound care management twice weekly  - NPO/IVF/TPN   - Monitor perc cony, ostomy and JOYCE drain output   - PRN pain and nausea control   - Hx of A-fib/flutter; continue Lopressor   - DVT chemo & mechanical ppx   - OOB/PT     D/w SICU, chief resident and attending  78M w/ Crohn's, AFib/Flutter s/p DCCVs on amiodarone, remote ileocectomy and open appy here for SBO vs Crohns flare, s/p NGT decompression and now s/p lap TRE converted to open TER, SBR x 3, left in discontinuity with abthera vac on 6/27, RTOR for ileocolic resection, small bowel anastomosis, and abdominal wall closure on 6/28, and s/p IR aspiration of perihepatic fluid on 7/3, stepped down to telemetry on 7/4. wound dehiscence on 7/5, RTOR ex-lap, washout, ileocolic resection with end ileostomy, blow hole colostomy, red rubber from ileostomy to small bowel anastomosis; Vicryl bridging mesh on 7/5. Transferred to SICU post op for HD monitoring. Extubated 7/6. Surgical wound with decreasing in size and granulating with Vac. Tentative plan for skin graft per Plastics; timing TBD pending proper wound shrinkage and granulation   8/23: Upgraded to SICU 8/23 for acute delirium and tremors, r/o sepsis vs metabolic encephalopathy. Ammonia levels normal    8/25: Spiked temp to 101.3 overnight w/ new leukocytosis to 14   8/28: Downgraded from SICU. No growth from blood cultures. Abx x 5 days until 8/29 8/30: Leukocytosis resolved   9/1: Decreased UOP, soft pressures. Cr 3.08 from 1.12. C/f dehydration 2/2 high fistula output vs ELVI   9/2: Upgraded to SICU for worsening ELVI. Stepped down 9/6   9/10: Upgraded to SICU for AMS, hypoglycemia and A-fib.   9/11: In setting of transaminitis, hyperbilirubinemia, leukocytosis and GB distention, underwent IR perc cony     Hemodynamically normal and afebrile. Low fistula output. Skin graft will not be feasible per plastics given position of fistula/ostomy over wound bed.     CT A/P 9/20 unremarkable   AXR 9/20 showed contrast pooling in stomach c/f gastroparesis. On Erythromycin. Nausea resolved   Intermittent SVT w/ ambulation     Plan:   - Consider discontinuing Erythromycin   - Continue Loperamide and Octreotide; hold off on Lomotil    - Appreciate wound care management twice weekly  - NPO/IVF/TPN   - Monitor perc cony, ostomy and JOYCE drain output   - PRN pain and nausea control   - Hx of A-fib/flutter; continue Lopressor. Appreciate EP input for SVT  - DVT chemo & mechanical ppx   - OOB/PT     D/w SICU, chief resident and attending

## 2023-09-28 NOTE — PROGRESS NOTE ADULT - SUBJECTIVE AND OBJECTIVE BOX
EPS Progress Note    S:     EP reconsulted, pt going into AF with mildly rapid rates twice while ambulated with PT pt having palpitations at the time. Primarily pt in a junctional rhythm @80bpm.     Has known AF, was on amio prior to hospitalization. Could be having paroxysmal AF now that the pre-admission amiodarone has been washed out of the system, also with overall deconditioning after prolonged hospitalization.     Episodes of AF are very short lived, lasting a few minutes (3-4 minutes) at a time.     Per team, unclear if patient is absorbing PO medications appropriately.     O: T(C): 36.9 (09-28-23 @ 13:00), Max: 37 (09-27-23 @ 17:43)  HR: 80 (09-28-23 @ 13:00) (78 - 82)  BP: 133/62 (09-28-23 @ 13:00) (117/53 - 178/76)  RR: 30 (09-28-23 @ 13:00) (12 - 36)  SpO2: 95% (09-28-23 @ 13:00) (91% - 100%)    PHYSICAL  General:  NAD        Chest:  CTA B/L, no w/r/r  Cardiac:  RRR, + s1/s2 , no m/g/r  Abdomen:   soft ND/NT  Extremities: No edema, b/l groin no hematoma/bleeding/oozing  Skin: no rash noted, normal color and pigmentation  Psych: A&Ox3, normal affect and mood  Neuro: no deficit noted     LABS:                        9.0    7.85  )-----------( 207      ( 27 Sep 2023 05:43 )             28.4     09-27    135  |  103  |  33<H>  ----------------------------<  113<H>  4.2   |  26  |  1.23    Ca    8.5      27 Sep 2023 05:43  Phos  3.9     09-27  Mg     2.1     09-27    TPro  7.7  /  Alb  2.3<L>  /  TBili  3.0<H>  /  DBili  2.0<H>  /  AST  70<H>  /  ALT  63<H>  /  AlkPhos  151<H>  09-27          MEDICATIONS:  amLODIPine   Tablet 5 milliGRAM(s) Oral daily  benzocaine/menthol Lozenge 2 Lozenge Oral every 4 hours PRN  chlorhexidine 2% Cloths 1 Application(s) Topical <User Schedule>  dextrose 5%. 1000 milliLiter(s) IV Continuous <Continuous>  dextrose 5%. 1000 milliLiter(s) IV Continuous <Continuous>  dextrose 50% Injectable 25 Gram(s) IV Push once  dextrose 50% Injectable 12.5 Gram(s) IV Push once  dextrose 50% Injectable 25 Gram(s) IV Push once  dextrose Oral Gel 15 Gram(s) Oral once PRN  erythromycin   IVPB 250 milliGRAM(s) IV Intermittent <User Schedule>  glucagon  Injectable 1 milliGRAM(s) IntraMuscular once  heparin   Injectable 5000 Unit(s) SubCutaneous every 8 hours  insulin lispro (ADMELOG) corrective regimen sliding scale   SubCutaneous every 6 hours  levothyroxine Injectable 40 MICROGram(s) IV Push at bedtime  lipid, fat emulsion (Fish Oil and Plant Based) 20% Infusion 1 Gm/kG/Day IV Continuous <Continuous>  loperamide 4 milliGRAM(s) Oral every 6 hours  melatonin 5 milliGRAM(s) Oral at bedtime  metoprolol tartrate Injectable 5 milliGRAM(s) IV Push every 6 hours  nystatin Powder 1 Application(s) Topical three times a day  ondansetron Injectable 4 milliGRAM(s) IV Push every 8 hours PRN  Parenteral Nutrition - Adult 1 Each TPN Continuous <Continuous>  Parenteral Nutrition - Adult 1 Each TPN Continuous <Continuous>      ASSESSMENT/PLAN  78M with history of Crohn's disease, known AF/AFL s/p multiple DCCV in the past, previously on amiodarone, with a prolonged hospital stay following extensive abdominal surgery c/b fistula. EP previously consulted for junctional rhythm and elevated LFTs, at that time amiodarone was discontinued and we recommended metoprolol.     Pt continues to receive metoprolol 5mg IV q6hr, would continue with this regimen for now, can uptitrate dose to 7.5 as needed, or time doses closer to PT sessions. Would avoid chronic amiodarone use given very intermittent and short lived episodes and history of transaminitis, although could be used PRN to control AF if episodes become prolonged or rates are difficult to control with only beta blockers. Would not transition to PO medications at this time given unclear absorption/ efficacy. Would continue initiation of oral AC if ok with primary/ surgical team given chadsvasc 2.

## 2023-09-28 NOTE — PROGRESS NOTE ADULT - SUBJECTIVE AND OBJECTIVE BOX
S: No overnight events. The patient reports sleeping well and feeling "great" this morning. While walking with PT/OT this afternoon, he had "tachycardia to the 120s systolic that resolved with rest" per PT.    O: ICU Vital Signs Last 24 Hrs  T(F): 98.5 (09-28-23 @ 13:00), Max: 98.6 (09-27-23 @ 17:43)  HR: 80 (09-28-23 @ 16:00) (78 - 83)  BP: 147/75 (09-28-23 @ 16:00) (117/53 - 178/76)  BP(mean): 103 (09-28-23 @ 16:00) (76 - 109)  ABP: --  RR: 37 (09-28-23 @ 16:00) (12 - 37)  SpO2: 100% (09-28-23 @ 16:00) (90% - 100%)    PHYSICAL EXAM:   Neurological: AAOx3, CNII-XII intact,  strength 5/5 b/l  ENT: mucus membrane moist, normal range of motion, no lymphadenopathy  Cardiovascular: Regular rate/rhythm, normal s1/s2, no murmurs, rubs, or gallops  Respiratory: Vesicular breath sounds bilaterally in upper and lower lung fields a/p  Gastrointestinal: soft, NT, ND, dressings clean and dry without surrounding erythema or edema.  Extremities: warm, no edema, palpable pulses x4 extremities, capillary refill <2 seconds  Skin: no rashes  MSK: no joint swelling.     LABS:    Lab holiday today.      CAPILLARY BLOOD GLUCOSE      POCT Blood Glucose.: 118 mg/dL (28 Sep 2023 16:45)  POCT Blood Glucose.: 113 mg/dL (28 Sep 2023 11:45)  POCT Blood Glucose.: 112 mg/dL (28 Sep 2023 07:29)  POCT Blood Glucose.: 108 mg/dL (27 Sep 2023 23:44)  POCT Blood Glucose.: 108 mg/dL (27 Sep 2023 17:30)    MEDICATIONS  (STANDING):  amLODIPine   Tablet 5 milliGRAM(s) Oral daily  chlorhexidine 2% Cloths 1 Application(s) Topical <User Schedule>  dextrose 5%. 1000 milliLiter(s) (50 mL/Hr) IV Continuous <Continuous>  dextrose 5%. 1000 milliLiter(s) (100 mL/Hr) IV Continuous <Continuous>  dextrose 50% Injectable 25 Gram(s) IV Push once  dextrose 50% Injectable 12.5 Gram(s) IV Push once  dextrose 50% Injectable 25 Gram(s) IV Push once  erythromycin   IVPB 250 milliGRAM(s) IV Intermittent <User Schedule>  glucagon  Injectable 1 milliGRAM(s) IntraMuscular once  heparin   Injectable 5000 Unit(s) SubCutaneous every 8 hours  insulin lispro (ADMELOG) corrective regimen sliding scale   SubCutaneous every 6 hours  levothyroxine Injectable 40 MICROGram(s) IV Push at bedtime  lipid, fat emulsion (Fish Oil and Plant Based) 20% Infusion 1 Gm/kG/Day (41.67 mL/Hr) IV Continuous <Continuous>  loperamide 4 milliGRAM(s) Oral every 6 hours  melatonin 5 milliGRAM(s) Oral at bedtime  metoprolol tartrate Injectable 5 milliGRAM(s) IV Push every 6 hours  nystatin Powder 1 Application(s) Topical three times a day  Parenteral Nutrition - Adult 1 Each (83 mL/Hr) TPN Continuous <Continuous>  Parenteral Nutrition - Adult 1 Each (83 mL/Hr) TPN Continuous <Continuous>    MEDICATIONS  (PRN):  benzocaine/menthol Lozenge 2 Lozenge Oral every 4 hours PRN Sore Throat  dextrose Oral Gel 15 Gram(s) Oral once PRN Blood Glucose LESS THAN 70 milliGRAM(s)/deciliter  ondansetron Injectable 4 milliGRAM(s) IV Push every 8 hours PRN Nausea and/or Vomiting

## 2023-09-28 NOTE — PROGRESS NOTE ADULT - ATTENDING COMMENTS
August 8, 2019       Ashley Morfin MD  FAX: [phone]    Patient: Chapincito Santos   YOB: 2012   Date of Visit: 8/8/2019       Dear  System:    I saw your patient, Chapincito Santos, for an evaluation. Below are my notes for this visit with him.    If you have questions, please do not hesitate to call me.      Sincerely,        Jake Donnelly MD        CC: No Recipients  Jake Donnelly MD  8/8/2019 11:01 AM  Sign at close encounter  Pediatric Cardiology Consultation    Referring Physician:  Ashley Morfin MD: [address], [facility] [address], [place] [address]; [phone], FAX: [phone]    Chief Complaint   Patient presents with   • Follow-up     Pulmonary valve stenosis      SUBJECTIVE  HPI: Chapincito Santos is a 7  Yrs 6  Mos male with history of pulmonary valve stenosis, here in [facility] with his parents for cardiac follow-up examination.    Chapincito has diagnosis of mild pulmonary valve stenosis at age of 5 months by Dr. Miguel Burr. He has been doing fine, active, playful, growing well without cardiorespiratory symptoms.  There is no apparent history of chest pain, palpitations, exercise intolerance, shortness of breath, cyanosis, or syncope.  He is going to be in first grade this fall.  His parents have no other concern.      REVIEW OF SYSTEM  Review of Systems   Constitutional: Negative for activity change, appetite change, chills, diaphoresis, fatigue, fever, irritability and unexpected weight change.   HENT: Negative for congestion, dental problem, drooling, ear discharge, ear pain, facial swelling, hearing loss, mouth sores, nosebleeds, postnasal drip, rhinorrhea, sinus pressure, sinus pain, sneezing, sore throat, tinnitus, trouble swallowing and voice change.    Eyes: Negative for photophobia, pain, discharge, redness, itching and visual disturbance.   Respiratory: Negative for apnea, cough, choking, chest tightness, shortness of breath, wheezing and stridor.       Cardiovascular: Negative for chest pain, palpitations and leg swelling.   Gastrointestinal: Negative for abdominal distention, abdominal pain, blood in stool, constipation, diarrhea, nausea and vomiting.   Endocrine: Negative for cold intolerance, heat intolerance, polydipsia, polyphagia and polyuria.   Genitourinary: Negative for difficulty urinating, dysuria, enuresis, flank pain, frequency, hematuria and urgency.   Musculoskeletal: Negative for arthralgias, back pain, gait problem, joint swelling, neck pain and neck stiffness.   Skin: Negative for color change, pallor and rash.   Allergic/Immunologic: Negative for environmental allergies, food allergies and immunocompromised state.   Neurological: Negative for tremors, seizures, syncope, facial asymmetry, weakness and headaches.   Hematological: Negative for adenopathy. Does not bruise/bleed easily.   Psychiatric/Behavioral: Negative for confusion, hallucinations, self-injury and sleep disturbance. The patient is not hyperactive.      Past Medical History:   Diagnosis Date   • Pulmonary stenosis      History reviewed. No pertinent surgical history.    Family History   Problem Relation Age of Onset   • Patient is unaware of any medical problems Mother    • Patient is unaware of any medical problems Father    • Other Maternal Grandmother         Heart Defect   • Patient is unaware of any medical problems Paternal Grandmother    • Patient is unaware of any medical problems Maternal Grandfather    • Patient is unaware of any medical problems Paternal Grandfather       There is no known family history of congenital heart disease, cardiomyopathy, early MI, arrhythmia, LQTS, sudden death.    Social History     Socioeconomic History   • Marital status: Single     Spouse name: Not on file   • Number of children: Not on file   • Years of education: Not on file   • Highest education level: Not on file   Occupational History   • Not on file   Social Needs   • Financial  resource strain: Not on file   • Food insecurity:     Worry: Not on file     Inability: Not on file   • Transportation needs:     Medical: Not on file     Non-medical: Not on file   Tobacco Use   • Smoking status: Never Smoker   • Smokeless tobacco: Never Used   Substance and Sexual Activity   • Alcohol use: Not on file   • Drug use: Never   • Sexual activity: Never   Lifestyle   • Physical activity:     Days per week: Not on file     Minutes per session: Not on file   • Stress: Not on file   Relationships   • Social connections:     Talks on phone: Not on file     Gets together: Not on file     Attends Mu-ism service: Not on file     Active member of club or organization: Not on file     Attends meetings of clubs or organizations: Not on file     Relationship status: Not on file   • Intimate partner violence:     Fear of current or ex partner: Not on file     Emotionally abused: Not on file     Physically abused: Not on file     Forced sexual activity: Not on file   Other Topics Concern   • Not on file   Social History Narrative    Patients Live with     Parents    sister(s) and brother(s)    dog and guinea pig    5K         No current outpatient medications on file.     No current facility-administered medications for this visit.      ALLERGIES:  No Known Allergies    Diagnostic history:  Echocardiogram on 8/31/2017 in [facility] [place] showed  1. Mild pulmonary valve stenosis, peak 29-37 mmHg, mean gradient 16-18 mmHg  2. Very mild aortic valve insufficiency   3. No left to right shunt lesions  4. Normal all chamber dimensions  5. No ventricular hypertrophy  6. Normal cardiac function    OBJECTIVE  Visit Vitals  /55   Pulse 87   Ht 4' 0.43\" (1.23 m)   Wt 22.5 kg (49 lb 9.6 oz)   SpO2 98%   BMI 14.87 kg/m²     Body surface area is 0.88 meters squared.    BMI is 33 %ile (Z= -0.45) based on CDC (Boys, 2-20 Years) BMI-for-age based on BMI available as of 8/8/2019.    Blood pressure percentiles are 80 %  Crohn's, AF, s/p SBR with ileocolic resection, end ileostomy, enteroatmospheric fistula, SVT  physical as above  continue TPN  follow rhythm on metoprolol  decrease erythro to Q12h  rest as above systolic and 40 % diastolic based on the 2017 AAP Clinical Practice Guideline. Blood pressure percentile targets: 90: 109/69, 95: 112/73, 95 + 12 mmH/85.    The height is at 79 %ile (Z= 0.80) based on [facility] (Boys, 2-20 Years) Stature-for-age data based on Stature recorded on 2019.    The weight is at 57 %ile (Z= 0.17) based on CDC (Boys, 2-20 Years) weight-for-age data using vitals from 2019.    Physical Exam    Constitutional: in no acute distress and interactive.     Head and Face: normocephalic and atraumatic. no dysmorphic features were observed.      Eyes: no discharge and normal conjunctiva. the sclerae were normal.      ENT: normal appearing outer ear and normal appearing nose. no nasal discharge. normal lips. oral mucosa pink and moist.      Neck: normal appearing neck and supple neck.      Chest: no thoracic asymmetry, no bulging precordium and no chest deformity.     Surgical Scar(s): None.      Pulmonary: no respiratory distress. no subcostal retractions. breath sounds clear to auscultation bilaterally.      Cardiovascular: The heart rate was normal. The rhythm was regular. Heart sounds: normal S1, normal S2, no gallop,   systolic click over upper left sternal border, no pericardial rub.   Murmurs:There is grade 3/6 systolic ejection murmur over the left sternal border. There is no diastolic murmur.    Radial Pulse: right 2+ and left 2+.   Posterior tibial pulse: right 2+ and left 2+. no pulse delay and capillary refill < 2 secs.   Lower Extremities: no edema present.      Abdomen: soft, nontender and nondistended. no hepatomegaly and no splenomegaly.      Lymphatic: no cervical lymphadenopathy.      Musculoskeletal: muscle strength and tone were grossly normal.      Neurologic: no coordination deficits observed and developmentally appropriate for age. the mood/affect was appropriate for age.      Skin: normal skin color and pigmentation, no bruises, no rash, no central cyanosis and no  peripheral cyanosis. normal skin turgor.     Patient Active Problem List    Diagnosis Date Noted   • PV (congenital pulmonary valve stenosis) 03/05/2014     Priority: Low     Orders Placed This Encounter   • Electrocardiogram 12-Lead   • Congenital transthoracic echo complete (PEDS)      Relevant Testing:  ECG: Electrocardiogram today on 8/8/2019 in [facility] shows normal sinus rhythm, heart rate 79/min, no delta wave, possible right ventricular hypertrophy, normal ST-T waves, normal corrected QT interval 372 ms, abnormal electrocardiogram.    Echocardiogram: Echocardiogram today on 8/8/2019 in [facility] shows  1. Moderate pulmonary valve stenosis, peak 32-39 mmHg, mean 17-20 mmHg  2. Very mild aortic valve insufficiency   3. No left to right shunt lesions  4. Normal all chamber dimensions  5. No ventricular hypertrophy  6. Normal cardiac function     ASSESSMENT:  Chapincito has been doing well, asymptomatic.  His pulmonary stenosis gradient is just slightly increased from last year.  The mean gradient this year is up to 20 mmHg.  I explained to his parents that if his mean gradient is approaching 40 to 50 mmHg, he will need balloon dilation of the pulmonary valve.    No other intervention is required at present time.    Final Diagnosis:   Q22.1 Congenital pulmonary valve stenosis  (primary encounter diagnosis)    RECOMMENDATIONS:  1. Feeding and Nutrition: Leading a healthy lifestyle, eating plenty of fruits and vegetables, eating whole grain foods, minimizing junk food and exercising regularly is important for cardiac health.  2. Medications:  No new medications.  Medications to avoid:  None.   3. Diagnostic tests:  No further cardiac laboratory tests or imaging required at this time.  4. SBE prophylaxis:  Not required. The best way to prevent infective endocarditis is to have good oral hygiene, brushing teeth twice daily, using dental floss and see dentist every 6 months.  5. Immunizations:   Routine immunizations should be provided, including influenza for patients over 6 months of age.   6. Exercise restrictions:  There should be no restriction placed on her activity from a cardiac standpoint. Chapincito Santos is clear for participation in all activities from a cardiac viewpoint, and needs no special cardiac precautions.   7. Surgical/Anesthesia Concerns:  Based on the available history and data, the patient is at no greater risk than the general population for surgery, anesthesia, or sedation.  8. Patient education:  To contact us for any new, concerning, or recurrent symptoms.  9. Follow up:  A return visit to cardiology clinic is in 2 years, unless new or concerning symptoms develop.  Routine follow up with the primary doctor is recommended.    His parents expressed understanding and agreement with the above recommendations.  All questions were answered.    Jake Donnelly MD  Pediatric Cardiology

## 2023-09-29 NOTE — PROGRESS NOTE ADULT - ASSESSMENT
78M w/ Crohn's, AFib/Flutter s/p DCCVs on amiodarone, remote ileocectomy and open appy here for SBO vs Crohns flare, s/p NGT decompression and now s/p lap TRE converted to open TRE, SBR x 3, left in discontinuity with abthera vac on 6/27, RTOR for ileocolic resection, small bowel anastomosis, and abdominal wall closure on 6/28, and s/p IR aspiration of perihepatic fluid on 7/3, stepped down to telemetry on 7/4. wound dehiscence on 7/5, RTOR ex-lap, washout, ileocolic resection with end ileostomy, blow hole colostomy, red rubber from ileostomy to small bowel anastomosis; Vicryl bridging mesh on 7/5. Transferred to SICU post op for HD monitoring. Extubated 7/6. Surgical wound with decreasing in size and granulating with Vac. Tentative plan for skin graft per Plastics; timing TBD pending proper wound shrinkage and granulation   8/23: Upgraded to SICU 8/23 for acute delirium and tremors, r/o sepsis vs metabolic encephalopathy. Ammonia levels normal    8/25: Spiked temp to 101.3 overnight w/ new leukocytosis to 14   8/28: Downgraded from SICU. No growth from blood cultures. Abx x 5 days until 8/29 8/30: Leukocytosis resolved   9/1: Decreased UOP, soft pressures. Cr 3.08 from 1.12. C/f dehydration 2/2 high fistula output vs ELVI   9/2: Upgraded to SICU for worsening ELVI. Stepped down 9/6   9/10: Upgraded to SICU for AMS, hypoglycemia and A-fib.   9/11: In setting of transaminitis, hyperbilirubinemia, leukocytosis and GB distention, underwent IR perc cony     Hemodynamically normal and afebrile. Low fistula output. Skin graft will not be feasible per plastics given position of fistula/ostomy over wound bed.     CT A/P 9/20 unremarkable   AXR 9/20 showed contrast pooling in stomach c/f gastroparesis. On Erythromycin. Nausea resolved   Mild persistent hyperbilirubinemia and transaminitis     Plan:   - Consider discontinuing Erythromycin   - Continue Loperamide and Octreotide; hold off on Lomotil    - Appreciate wound care management twice weekly  - NPO/IVF/TPN   - Monitor perc cony, ostomy and JOYCE drain output   - PRN pain and nausea control   - Hx of A-fib/flutter; continue Lopressor. Appreciate EP input  - DVT chemo & mechanical ppx   - OOB/PT     D/w SICU, chief resident and attending

## 2023-09-29 NOTE — PROGRESS NOTE ADULT - ASSESSMENT
The patient is a 78 M w/ Crohn's, AFib/Flutter s/p DCCVs on amiodarone, remote ileocectomy and open appendectomy. Admitted (6/23) for SBO vs Crohns flare, s/p NGT decompression and s/p lap TRE converted to open TRE, SBR x 3, left in discontinuity with abthera vac on (6/27), RTOR for ileocolic resection, small bowel anastomosis, and abdominal wall closure on (6/28), c/b fluid collection s/p IR aspiration of perihepatic fluid on (7/3), c/b wound dehiscence s/p RTOR exlap, washout, ileocolic resection with end ileostomy, blow hole colostomy, red rubber from ileostomy to small bowel anastomosis; vicryl bridging mesh on (7/5) transferred to SICU postoperatively for hemodynamic monitoring, with hospital course complicated by periods of AMS most recently on 9/1 and associated with oliguria, severe ELVI and hyponatremia, which resolved but now stepped back up for to SICU on 9/10 for acute AMS, intermittent hypoglycemia, AFib with RVR. Perc Choley tube placed on 9/11 for enlarged GB.     Recommendations  - Appreciate wound care management; wound manager changes Tues/ Fri - change today  - NPO/IVF/TPN   - Monitor perc cony, ostomy and JOYCE drain output - bolus prn   - PRN pain and nausea control   - Hx of A-fib/flutter; continue Lopressor, appreciate EP recs  - Care per primary and SICU  Surgery Team 4C will continue to follow. Please page Team 4 with questions/clinical changes. [phone]

## 2023-09-29 NOTE — PROGRESS NOTE ADULT - SUBJECTIVE AND OBJECTIVE BOX
SUBJECTIVE: Pt seen and examined at bedside this am by surgery team. Patient is lying comfortably in bed with no complaints. Plan for wound  today.    MEDICATIONS  (STANDING):  chlorhexidine 2% Cloths 1 Application(s) Topical <User Schedule>  glucagon  Injectable 1 milliGRAM(s) IntraMuscular once  heparin   Injectable 5000 Unit(s) SubCutaneous every 8 hours  levothyroxine Injectable 40 MICROGram(s) IV Push at bedtime  lipid, fat emulsion (Fish Oil and Plant Based) 20% Infusion 1 Gm/kG/Day (41.67 mL/Hr) IV Continuous <Continuous>  loperamide 4 milliGRAM(s) Oral every 6 hours  melatonin 5 milliGRAM(s) Oral at bedtime  metoprolol tartrate Injectable 5 milliGRAM(s) IV Push every 6 hours  nystatin Powder 1 Application(s) Topical three times a day  Parenteral Nutrition - Adult 1 Each (83 mL/Hr) TPN Continuous <Continuous>  Parenteral Nutrition - Adult 1 Each (83 mL/Hr) TPN Continuous <Continuous>    MEDICATIONS  (PRN):  benzocaine/menthol Lozenge 2 Lozenge Oral every 4 hours PRN Sore Throat  ondansetron Injectable 4 milliGRAM(s) IV Push every 8 hours PRN Nausea and/or Vomiting      Vital Signs Last 24 Hrs  T(C): 36.8 (29 Sep 2023 14:00), Max: 37 (29 Sep 2023 00:35)  T(F): 98.3 (29 Sep 2023 14:00), Max: 98.6 (29 Sep 2023 00:35)  HR: 83 (29 Sep 2023 14:00) (75 - 85)  BP: 128/67 (29 Sep 2023 14:00) (125/78 - 162/74)  BP(mean): 90 (29 Sep 2023 14:00) (83 - 109)  RR: 27 (29 Sep 2023 14:00) (12 - 41)  SpO2: 96% (29 Sep 2023 14:00) (90% - 100%)    Parameters below as of 29 Sep 2023 14:00  Patient On (Oxygen Delivery Method): room air        Physical Exam  General: Patient is doing well and lying in bed comfortably  Constitutional: alert and oriented   Pulm: Nonlabored breathing, no respiratory distress  CV: Regular rate and rhythm, normal sinus rhythm  Abd:  soft, nontender, nondistended. RUQ blow hole ostomy with minimal output. Perc Deb with bilious output. LUQ JOYCE with minimal output. Midline wound covered with wound manager; ECF within wound with decreasing output.   Extremities: warm, well perfused, no edema      I&O's Detail    28 Sep 2023 07:01  -  29 Sep 2023 07:00  --------------------------------------------------------  IN:    Fat Emulsion (Fish Oil &amp; Plant Based) 20% Infusion: 583.8 mL    IV PiggyBack: 500 mL    Oral Fluid: 150 mL    Sodium Chloride 0.9% Bolus: 500 mL    TPN (Total Parenteral Nutrition): 1909 mL  Total IN: 3642.8 mL    OUT:    Bulb (mL): 60 mL    Drain (mL): 40 mL    Drain (mL): 595 mL    Drain (mL): 600 mL    Fat Emulsion (Fish Oil &amp; Plant Based) 20% Infusion: 0 mL    Voided (mL): 3500 mL  Total OUT: 4795 mL    Total NET: -1152.2 mL      29 Sep 2023 07:01  -  29 Sep 2023 14:28  --------------------------------------------------------  IN:    Oral Fluid: 480 mL    TPN (Total Parenteral Nutrition): 664 mL  Total IN: 1144 mL    OUT:    Bulb (mL): 0 mL    Drain (mL): 100 mL    Drain (mL): 0 mL    Drain (mL): 210 mL    Voided (mL): 680 mL  Total OUT: 990 mL    Total NET: 154 mL        LABS:                        9.7    7.43  )-----------( 221      ( 29 Sep 2023 05:25 )             29.7     09-29    136  |  102  |  28<H>  ----------------------------<  117<H>  4.3   |  27  |  1.24    Ca    8.6      29 Sep 2023 05:25  Phos  3.8     09-29  Mg     2.2     09-29    TPro  8.2  /  Alb  2.4<L>  /  TBili  3.2<H>  /  DBili  2.0<H>  /  AST  75<H>  /  ALT  72<H>  /  AlkPhos  162<H>  09-29      Urinalysis Basic - ( 29 Sep 2023 05:25 )    Color: x / Appearance: x / SG: x / pH: x  Gluc: 117 mg/dL / Ketone: x  / Bili: x / Urobili: x   Blood: x / Protein: x / Nitrite: x   Leuk Esterase: x / RBC: x / WBC x   Sq Epi: x / Non Sq Epi: x / Bacteria: x

## 2023-09-29 NOTE — PROGRESS NOTE ADULT - ASSESSMENT
78 M w/ Crohn's, AFib/Flutter s/p DCCVs on amiodarone, remote ileocectomy and open appendectomy. Admitted (6/23) for SBO vs Crohns flare, s/p NGT decompression and s/p lap TRE converted to open TRE, SBR x 3, left in discontinuity with abthera vac on (6/27), RTOR for ileocolic resection, small bowel anastomosis, and abdominal wall closure on (6/28), c/b fluid collection s/p IR aspiration of perihepatic fluid on (7/3), c/b wound dehiscence s/p RTOR exlap, washout, ileocolic resection with end ileostomy, blow hole colostomy, red rubber from ileostomy to small bowel anastomosis; vicryl bridging mesh on (7/5) transferred to SICU postoperatively for hemodynamic monitoring, with hospital course complicated by periods of AMS most recently on 9/1 and associated with oliguria, severe ELVI and hyponatremia, which resolved but now stepped back up for to SICU on 9/10 for acute AMS, intermittent hypoglycemia, AFib with RVR. Perc Choley tube placed on 9/11 for enlarged GB. Apparent PSVT this week while walking, without repeat event; on metoprolol for prophylaxis, EP consulted and in agreement. Wound manager with increased air per patient; possibly due to increased GI motility with enteroatmospheric fistula vs manager leak; to be changed today.    NEURO: Alert and oriented x3; Integrative (pet) therapy ordered for patient comfort  HENT: Cepacol PRN for throat pain  CV: Hx Afib/flutter: s/p DCCV, Off amiodarone due to elevated transaminases and cocern for medication induced hepatotoxicity; Continue Metoprolol 5q6. Hx HTN - Holding Losartan. Hx HLD: hold Lipitor given LFTs. TTE  (7/18) - PASP 64mmHg, EF 65-70%, HTN: started norvasc 5qd. Metoprolol 5mg qd for rate control due to apparent PSVT yesterday; can increase to 7.5 mg qd if episodes recur; EP consulted with recommendation for the same.  PULM: no resp distress on room air.   GI/FEN: CT suspect delayed gastric emptying, on erythromycin for motility; erythromycin changed from tid to bid dosing. Cont NPO, cont Imodium, Holding Lomotil. Cont Octreotide in TPN for high ostomy and ECF output. PICC/TPN (Lipids 100gm, Dex@27/kg, AA @1.6). Transaminases improved from previously; of unknown origin, now stable. s/p Perc Choley on 9/11. PPI.   : Voids, S/p ELVI resolved.  ENDO: mISS. Hx hypothyroid: IV Synthroid, TSH wnl on 9/10  ID: Numerous SICU admissions for sepsis. Currently off abx. Cont Nystatin powder   // PREVIOUSLY had C. tertium, Lactobacillis from his IR cx 7/3, and candida albicans, lactobacillus, vanc sensitive E faecium, vanc resistant E gallinarum, vanc resistant E casseliflavus, lactobacillus from his OR cx 7/5. Completed course of abx with Imipenem (9/10-9/15). Imipenem (8/26--) imipenem (6/30-7/12, 7/23-7/30), Dapto (6/30-7/5 and 7/23-7/24).  empiric dapto (8/23-24) and cefepime (8/23-24).   PPX: SCDs, SQH  LINES: PIVs, LUE PICC (9/1- )  WOUNDS/DRAINS: Abdominal wound and fistula with wound manager in place. Rt LQ Ostomy. JOYCE.   PT: Ambulate as tolerated 78 M w/ Crohn's, AFib/Flutter s/p DCCVs on amiodarone, remote ileocectomy and open appendectomy. Admitted (6/23) for SBO vs Crohns flare, s/p NGT decompression and s/p lap TRE converted to open TRE, SBR x 3, left in discontinuity with abthera vac on (6/27), RTOR for ileocolic resection, small bowel anastomosis, and abdominal wall closure on (6/28), c/b fluid collection s/p IR aspiration of perihepatic fluid on (7/3), c/b wound dehiscence s/p RTOR exlap, washout, ileocolic resection with end ileostomy, blow hole colostomy, red rubber from ileostomy to small bowel anastomosis; vicryl bridging mesh on (7/5) transferred to SICU postoperatively for hemodynamic monitoring, with hospital course complicated by periods of AMS most recently on 9/1 and associated with oliguria, severe ELVI and hyponatremia, which resolved but now stepped back up for to SICU on 9/10 for acute AMS, intermittent hypoglycemia, AFib with RVR. Perc Choley tube placed on 9/11 for enlarged GB. Apparent PSVT this week while walking, without repeat event; on metoprolol for prophylaxis, EP consulted and in agreement. Wound manager with increased air per patient; possibly due to increased GI motility with enteroatmospheric fistula vs manager leak; to be changed today.    NEURO: Alert and oriented x3; Integrative (pet) therapy ordered for patient comfort  HENT: Cepacol PRN for throat pain  CV: Hx Afib/flutter: s/p DCCV, Off amiodarone due to elevated transaminases and cocern for medication induced hepatotoxicity; Continue Metoprolol 5q6. Hx HTN - Holding Losartan. Hx HLD: hold Lipitor given LFTs. TTE  (7/18) - PASP 64mmHg, EF 65-70%, HTN: started norvasc 5qd. Metoprolol 5mg qd for rate control due to apparent PSVT yesterday; can increase to 7.5 mg qd if episodes recur; EP consulted with recommendation for the same. Increase amlodipine to 10mg qd for hypertension  PULM: no resp distress on room air.   GI/FEN: CT suspect delayed gastric emptying, on erythromycin for motility; discontinue erythromycin as patient no longer retching. Cont NPO, cont Imodium, Holding Lomotil. Cont Octreotide in TPN for high ostomy and ECF output. PICC/TPN (Lipids 100gm, Dex@27/kg, AA @1.6). Transaminases improved from previously; of unknown origin, now stable. s/p Perc Choley on 9/11. PPI.   : Voids, S/p ELVI resolved.  ENDO: mISS. Hx hypothyroid: IV Synthroid, TSH wnl on 9/10  ID: Numerous SICU admissions for sepsis. Currently off abx. Cont Nystatin powder   // PREVIOUSLY had C. tertium, Lactobacillis from his IR cx 7/3, and candida albicans, lactobacillus, vanc sensitive E faecium, vanc resistant E gallinarum, vanc resistant E casseliflavus, lactobacillus from his OR cx 7/5. Completed course of abx with Imipenem (9/10-9/15). Imipenem (8/26--) imipenem (6/30-7/12, 7/23-7/30), Dapto (6/30-7/5 and 7/23-7/24).  empiric dapto (8/23-24) and cefepime (8/23-24).   PPX: SCDs, SQH  LINES: PIVs, LUE PICC (9/1- )  WOUNDS/DRAINS: Abdominal wound and fistula with wound manager in place. Rt LQ Ostomy. JOYCE.   PT: Ambulate as tolerated

## 2023-09-29 NOTE — CHART NOTE - NSCHARTNOTEFT_GEN_A_CORE
78 M w/ Crohn's, AFib/Flutter s/p DCCVs on amiodarone, remote ileocectomy and open appendectomy. Admitted (6/23) for SBO vs Crohns flare, s/p NGT decompression and s/p lap TRE converted to open TRE, SBR x 3, left in discontinuity with abthera vac on (6/27), RTOR for ileocolic resection, small bowel anastomosis, and abdominal wall closure on (6/28), c/b fluid collection s/p IR aspiration of perihepatic fluid on (7/3), c/b wound dehiscence s/p RTOR exlap, washout, ileocolic resection with end ileostomy, blow hole colostomy, red rubber from ileostomy to small bowel anastomosis; vicryl bridging mesh on (7/5) transferred to SICU postoperatively for hemodynamic monitoring, with hospital course complicated by periods of AMS which resolved but now stepped back up for to SICU on 9/10 for acute AMS, intermittent hypoglycemia, AFib with RVR. CT abd 9/10 showing distended gallbladder with sludge. S/P IR percutaneous cholecystostomy placement 9/11/23.    Perc cony:  600 ml output/24h (prior 24h 525ml). Dressing c/d/i. Flushed easily with 2cc NS.     -Continue to monitor output.  -IR to follow. 78 M w/ Crohn's, AFib/Flutter s/p DCCVs on amiodarone, remote ileocectomy and open appendectomy. Admitted (6/23) for SBO vs Crohns flare, s/p NGT decompression and s/p lap TRE converted to open TRE, SBR x 3, left in discontinuity with abthera vac on (6/27), RTOR for ileocolic resection, small bowel anastomosis, and abdominal wall closure on (6/28), c/b fluid collection s/p IR aspiration of perihepatic fluid on (7/3), c/b wound dehiscence s/p RTOR exlap, washout, ileocolic resection with end ileostomy, blow hole colostomy, red rubber from ileostomy to small bowel anastomosis; vicryl bridging mesh on (7/5) transferred to SICU postoperatively for hemodynamic monitoring, with hospital course complicated by periods of AMS which resolved but now stepped back up for to SICU on 9/10 for acute AMS, intermittent hypoglycemia, AFib with RVR. CT abd 9/10 showing distended gallbladder with sludge. S/P IR percutaneous cholecystostomy placement 9/11/23.    Perc cony:  595 ml output/24h (prior 24h 525ml). Dressing c/d/i. Flushed easily with 3cc NS.     -Continue to monitor output.  -IR to follow.

## 2023-09-29 NOTE — ADVANCED PRACTICE NURSE CONSULT - ASSESSMENT
No issues with wound manager. Pt reports feeling well, remains NPO, on TPN. OOB to chair and walking in the hallways. Denies pain.    Abdomen:  100% red granulation tissue with new epithelium to edges. Stomatized fistula at 2 o'clock with thin clear effluent. Periwound skin is intact without irritation. LLQ JOYCE drain with thin yellow drainage. RLQ ileostomy stoma retracted, small less than 1 inch ovoid, peristomal skin intact. Stoma intubated with red rubber catheter.    Right heel: small area of adherent black eschar, approx 0.5x0.5cm. Starting to lift at the edges. No drainage. Foot warm and dry.    Skin thoroughly cleansed.  Cavilon No Sting Barrier film applied to periwound, section of VAC isolator along edge of fistula, Adapt ostomy rings, stoma paste applied around wound and within creases.  Entire area pouched with an Dustin wound manager # [id number].   2 piece 1 3/4" pouch with Adapt ring applied to RLQ Ileostomy.   No issues with wound manager. Pt reports feeling well, remains NPO, on TPN. OOB to chair and walking in the hallways. Denies pain.    Abdomen:  100% red granulation tissue with new epithelium to edges. Stomatized fistula at 2 o'clock with thin yellow/light brown effluent. Periwound skin is intact without irritation. LLQ JOYCE drain with thin yellow drainage. RLQ ileostomy stoma retracted, small less than 1 inch ovoid, peristomal skin intact. Stoma intubated with red rubber catheter.    Right heel: small area of adherent black eschar, approx 0.5x0.5cm. Starting to lift at the edges. No drainage. Foot warm and dry.    Skin thoroughly cleansed.  Cavilon No Sting Barrier film applied to periwound, section of VAC isolator along edge of fistula, Adapt ostomy rings, stoma paste applied around wound and within creases.  Entire area pouched with an Dustin wound manager # [id number].   2 piece 1 3/4" pouch with Adapt ring applied to RLQ Ileostomy.

## 2023-09-29 NOTE — PROGRESS NOTE ADULT - ATTENDING COMMENTS
PT seen, agree with above, sitting up in bed, moving to chair. denies pain, no n/v. wound manager clean and dry. wound bed appears to have healthy granulation tissue.

## 2023-09-29 NOTE — PROGRESS NOTE ADULT - SUBJECTIVE AND OBJECTIVE BOX
SUBJECTIVE:  Patient seen and examined at bedside this AM   No acute events overnight   No complaints or concerns   No further nausea or runs of tachycardia   Voiding spontaneously   Gas and enteric content per ostomy/fistula       MEDICATIONS  (STANDING):  amLODIPine   Tablet 5 milliGRAM(s) Oral daily  chlorhexidine 2% Cloths 1 Application(s) Topical <User Schedule>  dextrose 5%. 1000 milliLiter(s) (100 mL/Hr) IV Continuous <Continuous>  dextrose 5%. 1000 milliLiter(s) (50 mL/Hr) IV Continuous <Continuous>  dextrose 50% Injectable 25 Gram(s) IV Push once  dextrose 50% Injectable 12.5 Gram(s) IV Push once  dextrose 50% Injectable 25 Gram(s) IV Push once  erythromycin   IVPB 250 milliGRAM(s) IV Intermittent <User Schedule>  glucagon  Injectable 1 milliGRAM(s) IntraMuscular once  heparin   Injectable 5000 Unit(s) SubCutaneous every 8 hours  insulin lispro (ADMELOG) corrective regimen sliding scale   SubCutaneous every 6 hours  levothyroxine Injectable 40 MICROGram(s) IV Push at bedtime  lipid, fat emulsion (Fish Oil and Plant Based) 20% Infusion 1 Gm/kG/Day (41.67 mL/Hr) IV Continuous <Continuous>  loperamide 4 milliGRAM(s) Oral every 6 hours  melatonin 5 milliGRAM(s) Oral at bedtime  metoprolol tartrate Injectable 5 milliGRAM(s) IV Push every 6 hours  nystatin Powder 1 Application(s) Topical three times a day  Parenteral Nutrition - Adult 1 Each (83 mL/Hr) TPN Continuous <Continuous>    MEDICATIONS  (PRN):  benzocaine/menthol Lozenge 2 Lozenge Oral every 4 hours PRN Sore Throat  dextrose Oral Gel 15 Gram(s) Oral once PRN Blood Glucose LESS THAN 70 milliGRAM(s)/deciliter  ondansetron Injectable 4 milliGRAM(s) IV Push every 8 hours PRN Nausea and/or Vomiting      Vital Signs Last 24 Hrs  T(C): 36.9 (29 Sep 2023 05:42), Max: 37 (29 Sep 2023 00:35)  T(F): 98.4 (29 Sep 2023 05:42), Max: 98.6 (29 Sep 2023 00:35)  HR: 80 (29 Sep 2023 07:00) (75 - 83)  BP: 147/65 (29 Sep 2023 07:00) (126/60 - 162/74)  BP(mean): 93 (29 Sep 2023 07:00) (87 - 109)  RR: 20 (29 Sep 2023 07:00) (12 - 37)  SpO2: 96% (29 Sep 2023 07:00) (90% - 100%)    Parameters below as of 29 Sep 2023 07:00  Patient On (Oxygen Delivery Method): room air        Physical Exam:  GENERAL: NAD, resting comfortably in bed  HEENT: NCAT, MMM  C/V: Normal rate, normal peripheral perfusion, no murmurs  PULM: Nonlabored breathing, no respiratory distress in room aur  ABD: Soft, ND, NT, no rebound tenderness, no guarding. Wound manager over midline wound with fistula/ostomy. RUQ ileostomy with minimal output (0/40 cc), perc cony with bilious output (70/370 cc). JOYCE in LUQ with minimal output (0/40 cc). LUQ fistula/ostomy (75/520 cc)   : Voiding spontaneously and adequately   EXTREM: WWP, no edema, SCDs in place  NEURO: No focal deficits   PSYCH: Mentating appropriately. Normal mood and affect       I&O's Summary    28 Sep 2023 07:01  -  29 Sep 2023 07:00  --------------------------------------------------------  IN: 3392.8 mL / OUT: 4520 mL / NET: -1127.2 mL        LABS:                        9.7    7.43  )-----------( 221      ( 29 Sep 2023 05:25 )             29.7     09-29    136  |  102  |  28<H>  ----------------------------<  117<H>  4.3   |  27  |  1.24    Ca    8.6      29 Sep 2023 05:25  Phos  3.8     09-29  Mg     2.2     09-29    TPro  8.2  /  Alb  2.4<L>  /  TBili  3.2<H>  /  DBili  2.0<H>  /  AST  75<H>  /  ALT  72<H>  /  AlkPhos  162<H>  09-29      Urinalysis Basic - ( 29 Sep 2023 05:25 )    Color: x / Appearance: x / SG: x / pH: x  Gluc: 117 mg/dL / Ketone: x  / Bili: x / Urobili: x   Blood: x / Protein: x / Nitrite: x   Leuk Esterase: x / RBC: x / WBC x   Sq Epi: x / Non Sq Epi: x / Bacteria: x      CAPILLARY BLOOD GLUCOSE      POCT Blood Glucose.: 119 mg/dL (28 Sep 2023 23:12)  POCT Blood Glucose.: 118 mg/dL (28 Sep 2023 16:45)  POCT Blood Glucose.: 113 mg/dL (28 Sep 2023 11:45)  POCT Blood Glucose.: 112 mg/dL (28 Sep 2023 07:29)    LIVER FUNCTIONS - ( 29 Sep 2023 05:25 )  Alb: 2.4 g/dL / Pro: 8.2 g/dL / ALK PHOS: 162 U/L / ALT: 72 U/L / AST: 75 U/L / GGT: x             RADIOLOGY & ADDITIONAL STUDIES:

## 2023-09-29 NOTE — PROGRESS NOTE ADULT - SUBJECTIVE AND OBJECTIVE BOX
S: No overnight events, patient slept well and feels "a little better" than yesterday. The patient reports that there is now more air present in the wound manager, which was seen by the surgery team this AM.    O: ICU Vital Signs Last 24 Hrs  T(F): 98.4 (09-29-23 @ 05:42), Max: 98.6 (09-29-23 @ 00:35)  HR: 80 (09-29-23 @ 07:00) (75 - 83)  BP: 147/65 (09-29-23 @ 07:00) (126/60 - 162/74)  BP(mean): 93 (09-29-23 @ 07:00) (87 - 109)  ABP: --  RR: 20 (09-29-23 @ 07:00) (12 - 37)  SpO2: 96% (09-29-23 @ 07:00) (90% - 100%)    PHYSICAL EXAM:   Neurological: AAOx3, CNII-XII intact,  strength 5/5 b/l  ENT: mucus membrane moist  Cardiovascular: RRR  Respiratory: CTA  Gastrointestinal: soft, NT, ND, BS+  Extremities: warm, no dependent edema  Vascular: no cyanosis/erythema  Skin: no rashes  MSK: no joint swelling.     LABS:    09-29    136  |  102  |  28<H>  ----------------------------<  117<H>  4.3   |  27  |  1.24    Ca    8.6      29 Sep 2023 05:25  Phos  3.8     09-29  Mg     2.2     09-29    TPro  8.2  /  Alb  2.4<L>  /  TBili  3.2<H>  /  DBili  2.0<H>  /  AST  75<H>  /  ALT  72<H>  /  AlkPhos  162<H>  09-29  LIVER FUNCTIONS - ( 29 Sep 2023 05:25 )  Alb: 2.4 g/dL / Pro: 8.2 g/dL / ALK PHOS: 162 U/L / ALT: 72 U/L / AST: 75 U/L / GGT: x                               9.7    7.43  )-----------( 221      ( 29 Sep 2023 05:25 )             29.7     Urinalysis Basic - ( 29 Sep 2023 05:25 )    Color: x / Appearance: x / SG: x / pH: x  Gluc: 117 mg/dL / Ketone: x  / Bili: x / Urobili: x   Blood: x / Protein: x / Nitrite: x   Leuk Esterase: x / RBC: x / WBC x   Sq Epi: x / Non Sq Epi: x / Bacteria: x    CAPILLARY BLOOD GLUCOSE      POCT Blood Glucose.: 119 mg/dL (28 Sep 2023 23:12)  POCT Blood Glucose.: 118 mg/dL (28 Sep 2023 16:45)  POCT Blood Glucose.: 113 mg/dL (28 Sep 2023 11:45)    MEDICATIONS  (STANDING):  amLODIPine   Tablet 5 milliGRAM(s) Oral daily  chlorhexidine 2% Cloths 1 Application(s) Topical <User Schedule>  dextrose 5%. 1000 milliLiter(s) (50 mL/Hr) IV Continuous <Continuous>  dextrose 5%. 1000 milliLiter(s) (100 mL/Hr) IV Continuous <Continuous>  dextrose 50% Injectable 25 Gram(s) IV Push once  dextrose 50% Injectable 12.5 Gram(s) IV Push once  dextrose 50% Injectable 25 Gram(s) IV Push once  erythromycin   IVPB 250 milliGRAM(s) IV Intermittent <User Schedule>  glucagon  Injectable 1 milliGRAM(s) IntraMuscular once  heparin   Injectable 5000 Unit(s) SubCutaneous every 8 hours  insulin lispro (ADMELOG) corrective regimen sliding scale   SubCutaneous every 6 hours  levothyroxine Injectable 40 MICROGram(s) IV Push at bedtime  lipid, fat emulsion (Fish Oil and Plant Based) 20% Infusion 1 Gm/kG/Day (41.67 mL/Hr) IV Continuous <Continuous>  loperamide 4 milliGRAM(s) Oral every 6 hours  melatonin 5 milliGRAM(s) Oral at bedtime  metoprolol tartrate Injectable 5 milliGRAM(s) IV Push every 6 hours  nystatin Powder 1 Application(s) Topical three times a day  Parenteral Nutrition - Adult 1 Each (83 mL/Hr) TPN Continuous <Continuous>    MEDICATIONS  (PRN):  benzocaine/menthol Lozenge 2 Lozenge Oral every 4 hours PRN Sore Throat  dextrose Oral Gel 15 Gram(s) Oral once PRN Blood Glucose LESS THAN 70 milliGRAM(s)/deciliter  ondansetron Injectable 4 milliGRAM(s) IV Push every 8 hours PRN Nausea and/or Vomiting           S: No overnight events, patient slept well and feels "a little better" than yesterday. The patient reports that there is now more air present in the wound manager, which was seen by the surgery team this AM.    O: ICU Vital Signs Last 24 Hrs  T(F): 98.4 (09-29-23 @ 05:42), Max: 98.6 (09-29-23 @ 00:35)  HR: 80 (09-29-23 @ 07:00) (75 - 83)  BP: 147/65 (09-29-23 @ 07:00) (126/60 - 162/74)  BP(mean): 93 (09-29-23 @ 07:00) (87 - 109)  ABP: --  RR: 20 (09-29-23 @ 07:00) (12 - 37)  SpO2: 96% (09-29-23 @ 07:00) (90% - 100%)    PHYSICAL EXAM:   Neurological: AAOx3, CNII-XII intact  ENT: mucus membrane moist, no lymphadenopathy, full ROM  Cardiovascular: RRR, S1/S2, no murmurs, rubs, or gallops  Respiratory: CTA bilaterally in all fields  Gastrointestinal: soft, NT, ND, BS+; wound manager draining beige fluid from fistula, now with increased gas in wound manager; perc cony draining green/brown bilious fluid; left JOYCE drain with minimal beige fluid; right ileostomy draining yellow liquid. No erhythema, edema, or pain around drain sites or wound  Extremities: Pink, warm, dry; capillary refill <2 sec, strong pulses x4 extremities  Skin: no rashes  MSK: no joint swelling or pain    LABS:    09-29    136  |  102  |  28<H>  ----------------------------<  117<H>  4.3   |  27  |  1.24    Ca    8.6      29 Sep 2023 05:25  Phos  3.8     09-29  Mg     2.2     09-29    TPro  8.2  /  Alb  2.4<L>  /  TBili  3.2<H>  /  DBili  2.0<H>  /  AST  75<H>  /  ALT  72<H>  /  AlkPhos  162<H>  09-29  LIVER FUNCTIONS - ( 29 Sep 2023 05:25 )  Alb: 2.4 g/dL / Pro: 8.2 g/dL / ALK PHOS: 162 U/L / ALT: 72 U/L / AST: 75 U/L / GGT: x                               9.7    7.43  )-----------( 221      ( 29 Sep 2023 05:25 )             29.7     Urinalysis Basic - ( 29 Sep 2023 05:25 )    Color: x / Appearance: x / SG: x / pH: x  Gluc: 117 mg/dL / Ketone: x  / Bili: x / Urobili: x   Blood: x / Protein: x / Nitrite: x   Leuk Esterase: x / RBC: x / WBC x   Sq Epi: x / Non Sq Epi: x / Bacteria: x    CAPILLARY BLOOD GLUCOSE      POCT Blood Glucose.: 119 mg/dL (28 Sep 2023 23:12)  POCT Blood Glucose.: 118 mg/dL (28 Sep 2023 16:45)  POCT Blood Glucose.: 113 mg/dL (28 Sep 2023 11:45)    MEDICATIONS  (STANDING):  amLODIPine   Tablet 5 milliGRAM(s) Oral daily  chlorhexidine 2% Cloths 1 Application(s) Topical <User Schedule>  dextrose 5%. 1000 milliLiter(s) (50 mL/Hr) IV Continuous <Continuous>  dextrose 5%. 1000 milliLiter(s) (100 mL/Hr) IV Continuous <Continuous>  dextrose 50% Injectable 25 Gram(s) IV Push once  dextrose 50% Injectable 12.5 Gram(s) IV Push once  dextrose 50% Injectable 25 Gram(s) IV Push once  erythromycin   IVPB 250 milliGRAM(s) IV Intermittent <User Schedule>  glucagon  Injectable 1 milliGRAM(s) IntraMuscular once  heparin   Injectable 5000 Unit(s) SubCutaneous every 8 hours  insulin lispro (ADMELOG) corrective regimen sliding scale   SubCutaneous every 6 hours  levothyroxine Injectable 40 MICROGram(s) IV Push at bedtime  lipid, fat emulsion (Fish Oil and Plant Based) 20% Infusion 1 Gm/kG/Day (41.67 mL/Hr) IV Continuous <Continuous>  loperamide 4 milliGRAM(s) Oral every 6 hours  melatonin 5 milliGRAM(s) Oral at bedtime  metoprolol tartrate Injectable 5 milliGRAM(s) IV Push every 6 hours  nystatin Powder 1 Application(s) Topical three times a day  Parenteral Nutrition - Adult 1 Each (83 mL/Hr) TPN Continuous <Continuous>    MEDICATIONS  (PRN):  benzocaine/menthol Lozenge 2 Lozenge Oral every 4 hours PRN Sore Throat  dextrose Oral Gel 15 Gram(s) Oral once PRN Blood Glucose LESS THAN 70 milliGRAM(s)/deciliter  ondansetron Injectable 4 milliGRAM(s) IV Push every 8 hours PRN Nausea and/or Vomiting

## 2023-09-29 NOTE — PROGRESS NOTE ADULT - ATTENDING COMMENTS
Crohn's, AF, S/P SBR, ileocecal resection, Franc's with end ileostomy, enteroatmospheric fistula  physical as above  continue TPN  DC erythromycin  increase norvasc to 10 mg daily  metoprolol 5 mg IV q 6h, increase if more supraventricular arrhythmias  rest as above

## 2023-09-30 NOTE — PROGRESS NOTE ADULT - SUBJECTIVE AND OBJECTIVE BOX
INTERVAL/OVERNIGHT EVENTS: None     SUBJECTIVE: Well appearing. No complaints or concerns. No nausea. Voiding spontaneously and adequately      Neurologic Medications  melatonin 5 milliGRAM(s) Oral at bedtime  ondansetron Injectable 4 milliGRAM(s) IV Push every 8 hours PRN Nausea and/or Vomiting    Respiratory Medications    Cardiovascular Medications  amLODIPine   Tablet 10 milliGRAM(s) Oral daily  metoprolol tartrate Injectable 5 milliGRAM(s) IV Push every 6 hours    Gastrointestinal Medications  lipid, fat emulsion (Fish Oil and Plant Based) 20% Infusion 1 Gm/kG/Day IV Continuous <Continuous>  loperamide 4 milliGRAM(s) Oral every 6 hours  Parenteral Nutrition - Adult 1 Each TPN Continuous <Continuous>    Genitourinary Medications    Hematologic/Oncologic Medications  heparin   Injectable 5000 Unit(s) SubCutaneous every 8 hours    Antimicrobial/Immunologic Medications    Endocrine/Metabolic Medications  glucagon  Injectable 1 milliGRAM(s) IntraMuscular once  levothyroxine Injectable 40 MICROGram(s) IV Push at bedtime    Topical/Other Medications  benzocaine/menthol Lozenge 2 Lozenge Oral every 4 hours PRN Sore Throat  chlorhexidine 2% Cloths 1 Application(s) Topical <User Schedule>  nystatin Powder 1 Application(s) Topical three times a day      MEDICATIONS  (PRN):  benzocaine/menthol Lozenge 2 Lozenge Oral every 4 hours PRN Sore Throat  ondansetron Injectable 4 milliGRAM(s) IV Push every 8 hours PRN Nausea and/or Vomiting      I&O's Detail    29 Sep 2023 07:01  -  30 Sep 2023 07:00  --------------------------------------------------------  IN:    Fat Emulsion (Fish Oil &amp; Plant Based) 20% Infusion: 583.8 mL    Oral Fluid: 830 mL    TPN (Total Parenteral Nutrition): 1992 mL  Total IN: 3405.8 mL    OUT:    Bulb (mL): 15 mL    Drain (mL): 20 mL    Drain (mL): 660 mL    Drain (mL): 325 mL    Voided (mL): 2085 mL  Total OUT: 3105 mL    Total NET: 300.8 mL      30 Sep 2023 07:01  -  30 Sep 2023 09:36  --------------------------------------------------------  IN:    TPN (Total Parenteral Nutrition): 249 mL  Total IN: 249 mL    OUT:    Bulb (mL): 0 mL    Drain (mL): 100 mL    Drain (mL): 0 mL    Drain (mL): 50 mL    Voided (mL): 350 mL  Total OUT: 500 mL    Total NET: -251 mL          Vital Signs Last 24 Hrs  T(C): 36.6 (30 Sep 2023 04:30), Max: 36.9 (29 Sep 2023 21:51)  T(F): 97.9 (30 Sep 2023 04:30), Max: 98.5 (29 Sep 2023 21:51)  HR: 86 (30 Sep 2023 09:00) (78 - 86)  BP: 142/66 (30 Sep 2023 09:00) (102/84 - 155/67)  BP(mean): 95 (30 Sep 2023 09:00) (83 - 115)  RR: 21 (30 Sep 2023 09:00) (13 - 35)  SpO2: 99% (30 Sep 2023 09:00) (94% - 100%)    Parameters below as of 30 Sep 2023 10:00  Patient On (Oxygen Delivery Method): room air      Physical Exam   Neurological: AAOx3, CNII-XII intact  ENT: mucus membrane moist, no lymphadenopathy, full ROM  Cardiovascular: RRR, S1/S2, no murmurs, rubs, or gallops  Respiratory: CTA bilaterally in all fields  Gastrointestinal: soft, NT, ND, BS+; wound manager draining beige fluid from fistula, now with increased gas in wound manager; perc cony draining green/brown bilious fluid; left JOYCE drain with minimal beige fluid; right ileostomy draining yellow liquid. No erhythema, edema, or pain around drain sites or wound  Extremities: Pink, warm, dry; capillary refill <2 sec, strong pulses x4 extremities  Skin: no rashes  MSK: no joint swelling or pain    LABS:                        9.7    7.43  )-----------( 221      ( 29 Sep 2023 05:25 )             29.7     09-29    136  |  102  |  28<H>  ----------------------------<  117<H>  4.3   |  27  |  1.24    Ca    8.6      29 Sep 2023 05:25  Phos  3.8     09-29  Mg     2.2     09-29    TPro  8.2  /  Alb  2.4<L>  /  TBili  3.2<H>  /  DBili  2.0<H>  /  AST  75<H>  /  ALT  72<H>  /  AlkPhos  162<H>  09-29      Urinalysis Basic - ( 29 Sep 2023 05:25 )    Color: x / Appearance: x / SG: x / pH: x  Gluc: 117 mg/dL / Ketone: x  / Bili: x / Urobili: x   Blood: x / Protein: x / Nitrite: x   Leuk Esterase: x / RBC: x / WBC x   Sq Epi: x / Non Sq Epi: x / Bacteria: x        RADIOLOGY & ADDITIONAL STUDIES:

## 2023-09-30 NOTE — PROGRESS NOTE ADULT - ASSESSMENT
78M w/ Crohn's, AFib/Flutter s/p DCCVs on amiodarone, remote ileocectomy and open appy here for SBO vs Crohns flare, s/p NGT decompression and now s/p lap TRE converted to open TRE, SBR x 3, left in discontinuity with abthera vac on 6/27, RTOR for ileocolic resection, small bowel anastomosis, and abdominal wall closure on 6/28, and s/p IR aspiration of perihepatic fluid on 7/3, stepped down to telemetry on 7/4. wound dehiscence on 7/5, RTOR ex-lap, washout, ileocolic resection with end ileostomy, blow hole colostomy, red rubber from ileostomy to small bowel anastomosis; Vicryl bridging mesh on 7/5. Transferred to SICU post op for HD monitoring. Extubated 7/6. Surgical wound with decreasing in size and granulating with Vac. Tentative plan for skin graft per Plastics; timing TBD pending proper wound shrinkage and granulation   8/23: Upgraded to SICU 8/23 for acute delirium and tremors, r/o sepsis vs metabolic encephalopathy. Ammonia levels normal    8/25: Spiked temp to 101.3 overnight w/ new leukocytosis to 14   8/28: Downgraded from SICU. No growth from blood cultures. Abx x 5 days until 8/29 8/30: Leukocytosis resolved   9/1: Decreased UOP, soft pressures. Cr 3.08 from 1.12. C/f dehydration 2/2 high fistula output vs ELVI   9/2: Upgraded to SICU for worsening ELVI. Stepped down 9/6   9/10: Upgraded to SICU for AMS, hypoglycemia and A-fib.   9/11: In setting of transaminitis, hyperbilirubinemia, leukocytosis and GB distention, underwent IR perc cony     Hemodynamically normal and afebrile. Low fistula output. Skin graft will not be feasible per plastics given position of fistula/ostomy over wound bed.     CT A/P 9/20 unremarkable   AXR 9/20 showed contrast pooling in stomach c/f gastroparesis. On Erythromycin. Nausea resolved   Mild persistent hyperbilirubinemia and transaminitis     Plan:   - Continue Loperamide and Octreotide; hold off on Lomotil    - Appreciate wound care management twice weekly  - NPO/IVF/TPN   - Monitor perc cony, ostomy and JOYCE drain output   - PRN pain and nausea control   - Hx of A-fib/flutter; continue Lopressor. Appreciate EP input  - DVT chemo & mechanical ppx   - OOB/PT

## 2023-09-30 NOTE — PROGRESS NOTE ADULT - ASSESSMENT
78 M w/ Crohn's, AFib/Flutter s/p DCCVs on amiodarone, remote ileocectomy and open appendectomy. Admitted (6/23) for SBO vs Crohns flare, s/p NGT decompression and s/p lap TRE converted to open TRE, SBR x 3, left in discontinuity with abthera vac on (6/27), RTOR for ileocolic resection, small bowel anastomosis, and abdominal wall closure on (6/28), c/b fluid collection s/p IR aspiration of perihepatic fluid on (7/3), c/b wound dehiscence s/p RTOR exlap, washout, ileocolic resection with end ileostomy, blow hole colostomy, red rubber from ileostomy to small bowel anastomosis; vicryl bridging mesh on (7/5) transferred to SICU postoperatively for hemodynamic monitoring, with hospital course complicated by periods of AMS most recently on 9/1 and associated with oliguria, severe ELVI and hyponatremia, which resolved but now stepped back up for to SICU on 9/10 for acute AMS, intermittent hypoglycemia, AFib with RVR. Perc Choley tube placed on 9/11 for enlarged GB.     NEURO: AMS (resolved), EEG neg. Hx of depression - effexor Dc'd on 9/10 due to dilerium. Pain control with Dilaudid for vac changes only. Cont Melatonin prn Insomnia, Cont Zofran   HENT: Cepacol  CV: Hx Afib/flutter: s/p DCCV, Off amiodarone given transaminitis; Continue Metoprolol 5q6. Hx HTN - Holding Losartan. Hx HLD: hold Lipitor given LFTs. TTE  (7/18) - PASP 64mmHg, EF 65-70%, HTN: started norvasc 10qd.   PULM: no resp distress on room air.   GI/FEN: CT suspect delayed gastric emptying, d/c'd erythromycin (for gastric dysmotility). Cont NPO, cont Imodium, Holding Lomotil. Cont Octreotide in TPN for high ostomy and ECF output. PICC/TPN (Lipids 100gm, Dex@27/kg, AA @1.6). Transaminitis of unknown origin, now stable. s/p Perc Choley on 9/11. PPI.   : Voids, S/p ELVI resolved.  ENDO: mISS. Hx hypothyroid: IV Synthroid, TSH wnl on 9/10  ID: Numerous SICU admissions for sepsis. Currently off abx. Cont Nystatin powder   // PREVIOUSLY had C. tertium, Lactobacillis from his IR cx 7/3, and candida albicans, lactobacillus, vanc sensitive E faecium, vanc resistant E gallinarum, vanc resistant E casseliflavus, lactobacillus from his OR cx 7/5. Completed course of abx with Imipenem (9/10-9/15). Imipenem (8/26--) imipenem (6/30-7/12, 7/23-7/30), Dapto (6/30-7/5 and 7/23-7/24).  empiric dapto (8/23-24) and cefepime (8/23-24).   PPX: SCDs, SQH  LINES: PIVs, LUE PICC (9/1- )  WOUNDS/DRAINS: Abdominal wound and fistula with wound manager in place. Rt LQ Ostomy. JOYCE.   PT: Ambulate as tolerated   DISPO: SICU, q4 vitals

## 2023-09-30 NOTE — PROGRESS NOTE ADULT - ATTENDING COMMENTS
Hemodynamically stable and comfortable breathing. Continue current nutritional support and current care. OOB.

## 2023-09-30 NOTE — PROGRESS NOTE ADULT - SUBJECTIVE AND OBJECTIVE BOX
STATUS POST:  Exploratory laparotomy    Laparoscopic lysis of intestinal adhesions    Exploratory laparotomy    Open lysis of intestinal adhesions    Resection of small bowel    Temporary closure of abdominal cavity    Repair, anastomosis, ileocolic    Small bowel resection with anastomosis    Flap, myocutaneous, abdominal wall    Reconstruction of abdominal wall using mesh    Washout of abdominal cavity    Creation of ileostomy    Creation of colostomy    Laparoscopic lysis of intestinal adhesions    Open lysis of intestinal adhesions    Resection of small bowel    Temporary closure of abdominal cavity    Repair, anastomosis, ileocolic    Small bowel resection with anastomosis    Flap, myocutaneous, abdominal wall    Reconstruction of abdominal wall using mesh    Washout of abdominal cavity        POST OPERATIVE DAY #:     SUBJECTIVE:     Vital Signs Last 24 Hrs  T(C): 36.6 (30 Sep 2023 04:30), Max: 36.9 (29 Sep 2023 05:42)  T(F): 97.9 (30 Sep 2023 04:30), Max: 98.5 (29 Sep 2023 21:51)  HR: 86 (30 Sep 2023 05:00) (78 - 86)  BP: 134/60 (30 Sep 2023 05:00) (102/84 - 155/67)  BP(mean): 87 (30 Sep 2023 05:00) (83 - 115)  RR: 16 (30 Sep 2023 05:00) (13 - 35)  SpO2: 95% (30 Sep 2023 05:00) (94% - 100%)    Parameters below as of 30 Sep 2023 05:00  Patient On (Oxygen Delivery Method): room air        I&O's Summary    28 Sep 2023 07:01  -  29 Sep 2023 07:00  --------------------------------------------------------  IN: 3642.8 mL / OUT: 4795 mL / NET: -1152.2 mL    29 Sep 2023 07:01  -  30 Sep 2023 05:28  --------------------------------------------------------  IN: 3048.1 mL / OUT: 2575 mL / NET: 473.1 mL        Physical Exam:  General Appearance: Appears well, NAD  Pulmonary: Nonlabored breathing, no respiratory distress  Cardiovascular: NSR  Abdomen: Soft, nondisteded, appropriate incisional tenderness, dressings clean and dry and intact  Extremities: WWP, SCD's in place     LABS:                        9.7    7.43  )-----------( 221      ( 29 Sep 2023 05:25 )             29.7     09-29    136  |  102  |  28<H>  ----------------------------<  117<H>  4.3   |  27  |  1.24    Ca    8.6      29 Sep 2023 05:25  Phos  3.8     09-29  Mg     2.2     09-29    TPro  8.2  /  Alb  2.4<L>  /  TBili  3.2<H>  /  DBili  2.0<H>  /  AST  75<H>  /  ALT  72<H>  /  AlkPhos  162<H>  09-29      Urinalysis Basic - ( 29 Sep 2023 05:25 )    Color: x / Appearance: x / SG: x / pH: x  Gluc: 117 mg/dL / Ketone: x  / Bili: x / Urobili: x   Blood: x / Protein: x / Nitrite: x   Leuk Esterase: x / RBC: x / WBC x   Sq Epi: x / Non Sq Epi: x / Bacteria: x         SUBJECTIVE: Pt seen with chief resident on am rounds. Pt doing well with AMRITA overnight.    Vital Signs Last 24 Hrs  T(C): 36.6 (30 Sep 2023 04:30), Max: 36.9 (29 Sep 2023 05:42)  T(F): 97.9 (30 Sep 2023 04:30), Max: 98.5 (29 Sep 2023 21:51)  HR: 86 (30 Sep 2023 05:00) (78 - 86)  BP: 134/60 (30 Sep 2023 05:00) (102/84 - 155/67)  BP(mean): 87 (30 Sep 2023 05:00) (83 - 115)  RR: 16 (30 Sep 2023 05:00) (13 - 35)  SpO2: 95% (30 Sep 2023 05:00) (94% - 100%)    Parameters below as of 30 Sep 2023 05:00  Patient On (Oxygen Delivery Method): room air        I&O's Summary    28 Sep 2023 07:01  -  29 Sep 2023 07:00  --------------------------------------------------------  IN: 3642.8 mL / OUT: 4795 mL / NET: -1152.2 mL    29 Sep 2023 07:01  -  30 Sep 2023 05:28  --------------------------------------------------------  IN: 3048.1 mL / OUT: 2575 mL / NET: 473.1 mL        Physical Exam:  General Appearance: Appears well, NAD  Pulmonary: Nonlabored breathing, no respiratory distress  Cardiovascular: NSR  Abdomen: wound manager in place, cholecystostomy tube in place  Extremities: WWP, SCD's in place     LABS:                        9.7    7.43  )-----------( 221      ( 29 Sep 2023 05:25 )             29.7     09-29    136  |  102  |  28<H>  ----------------------------<  117<H>  4.3   |  27  |  1.24    Ca    8.6      29 Sep 2023 05:25  Phos  3.8     09-29  Mg     2.2     09-29    TPro  8.2  /  Alb  2.4<L>  /  TBili  3.2<H>  /  DBili  2.0<H>  /  AST  75<H>  /  ALT  72<H>  /  AlkPhos  162<H>  09-29      Urinalysis Basic - ( 29 Sep 2023 05:25 )    Color: x / Appearance: x / SG: x / pH: x  Gluc: 117 mg/dL / Ketone: x  / Bili: x / Urobili: x   Blood: x / Protein: x / Nitrite: x   Leuk Esterase: x / RBC: x / WBC x   Sq Epi: x / Non Sq Epi: x / Bacteria: x

## 2023-10-01 NOTE — PROGRESS NOTE ADULT - SUBJECTIVE AND OBJECTIVE BOX
POST-OP DAY: X s/p      SUBJECTIVE: Patient seen and examined bedside by Surgical resident.    amLODIPine   Tablet 10 milliGRAM(s) Oral daily  heparin   Injectable 5000 Unit(s) SubCutaneous every 8 hours  metoprolol tartrate Injectable 5 milliGRAM(s) IV Push every 6 hours    MEDICATIONS  (PRN):  benzocaine/menthol Lozenge 2 Lozenge Oral every 4 hours PRN Sore Throat  ondansetron Injectable 4 milliGRAM(s) IV Push every 8 hours PRN Nausea and/or Vomiting      I&O's Detail    29 Sep 2023 07:01  -  30 Sep 2023 07:00  --------------------------------------------------------  IN:    Fat Emulsion (Fish Oil &amp; Plant Based) 20% Infusion: 583.8 mL    Oral Fluid: 830 mL    TPN (Total Parenteral Nutrition): 1992 mL  Total IN: 3405.8 mL    OUT:    Bulb (mL): 15 mL    Drain (mL): 20 mL    Drain (mL): 660 mL    Drain (mL): 325 mL    Voided (mL): 2085 mL  Total OUT: 3105 mL    Total NET: 300.8 mL      30 Sep 2023 07:01  -  01 Oct 2023 06:42  --------------------------------------------------------  IN:    Fat Emulsion (Fish Oil &amp; Plant Based) 20% Infusion: 500.4 mL    Oral Fluid: 237 mL    TPN (Total Parenteral Nutrition): 1826 mL  Total IN: 2563.4 mL    OUT:    Bulb (mL): 40 mL    Drain (mL): 50 mL    Drain (mL): 480 mL    Drain (mL): 430 mL    Voided (mL): 1750 mL  Total OUT: 2750 mL    Total NET: -186.6 mL          Vital Signs Last 24 Hrs  T(C): 36.6 (01 Oct 2023 01:02), Max: 36.6 (30 Sep 2023 21:50)  T(F): 97.8 (01 Oct 2023 01:02), Max: 97.9 (30 Sep 2023 21:50)  HR: 77 (01 Oct 2023 06:00) (77 - 89)  BP: 133/62 (01 Oct 2023 06:00) (110/56 - 147/65)  BP(mean): 89 (01 Oct 2023 06:00) (76 - 99)  RR: 17 (01 Oct 2023 06:00) (14 - 39)  SpO2: 96% (01 Oct 2023 06:00) (92% - 100%)    Parameters below as of 01 Oct 2023 06:00  Patient On (Oxygen Delivery Method): room air        General: NAD, resting comfortably in bed  C/V: NSR  Pulm: Nonlabored breathing, no respiratory distress  Abd: soft, NT/ND  Extrem: WWP, no edema, SCDs in place    LABS:                RADIOLOGY & ADDITIONAL STUDIES:       SUBJECTIVE: Patient seen and examined bedside by Surgical resident. Resting comfortably in bed, energetic, and engaging this morning.     amLODIPine   Tablet 10 milliGRAM(s) Oral daily  heparin   Injectable 5000 Unit(s) SubCutaneous every 8 hours  metoprolol tartrate Injectable 5 milliGRAM(s) IV Push every 6 hours    MEDICATIONS  (PRN):  benzocaine/menthol Lozenge 2 Lozenge Oral every 4 hours PRN Sore Throat  ondansetron Injectable 4 milliGRAM(s) IV Push every 8 hours PRN Nausea and/or Vomiting      I&O's Detail    29 Sep 2023 07:01  -  30 Sep 2023 07:00  --------------------------------------------------------  IN:    Fat Emulsion (Fish Oil &amp; Plant Based) 20% Infusion: 583.8 mL    Oral Fluid: 830 mL    TPN (Total Parenteral Nutrition): 1992 mL  Total IN: 3405.8 mL    OUT:    Bulb (mL): 15 mL    Drain (mL): 20 mL    Drain (mL): 660 mL    Drain (mL): 325 mL    Voided (mL): 2085 mL  Total OUT: 3105 mL    Total NET: 300.8 mL      30 Sep 2023 07:01  -  01 Oct 2023 06:42  --------------------------------------------------------  IN:    Fat Emulsion (Fish Oil &amp; Plant Based) 20% Infusion: 500.4 mL    Oral Fluid: 237 mL    TPN (Total Parenteral Nutrition): 1826 mL  Total IN: 2563.4 mL    OUT:    Bulb (mL): 40 mL    Drain (mL): 50 mL    Drain (mL): 480 mL    Drain (mL): 430 mL    Voided (mL): 1750 mL  Total OUT: 2750 mL    Total NET: -186.6 mL          Vital Signs Last 24 Hrs  T(C): 36.6 (01 Oct 2023 01:02), Max: 36.6 (30 Sep 2023 21:50)  T(F): 97.8 (01 Oct 2023 01:02), Max: 97.9 (30 Sep 2023 21:50)  HR: 77 (01 Oct 2023 06:00) (77 - 89)  BP: 133/62 (01 Oct 2023 06:00) (110/56 - 147/65)  BP(mean): 89 (01 Oct 2023 06:00) (76 - 99)  RR: 17 (01 Oct 2023 06:00) (14 - 39)  SpO2: 96% (01 Oct 2023 06:00) (92% - 100%)    Parameters below as of 01 Oct 2023 06:00  Patient On (Oxygen Delivery Method): room air        General: NAD, resting comfortably in bed  C/V: NSR  Pulm: Nonlabored breathing, no respiratory distress  Abd: wound manager in place, cholecystostomy tube in place  Extrem: WWP, no edema, SCDs in place    LABS:                RADIOLOGY & ADDITIONAL STUDIES:

## 2023-10-02 NOTE — PROGRESS NOTE ADULT - ATTENDING COMMENTS
Crohn's, AF s/p SBR, ileocolic resection, ileostomy, enteroatmospheric fistula  physical as above  feels stronger  continue TPN  continue IV metoprolol  renal function is stable

## 2023-10-02 NOTE — CHART NOTE - NSCHARTNOTEFT_GEN_A_CORE
78 M w/ Crohn's, AFib/Flutter s/p DCCVs on amiodarone, remote ileocectomy and open appendectomy. Admitted (6/23) for SBO vs Crohns flare, s/p NGT decompression and s/p lap TRE converted to open TRE, SBR x 3, left in discontinuity with abthera vac on (6/27), RTOR for ileocolic resection, small bowel anastomosis, and abdominal wall closure on (6/28), c/b fluid collection s/p IR aspiration of perihepatic fluid on (7/3), c/b wound dehiscence s/p RTOR exlap, washout, ileocolic resection with end ileostomy, blow hole colostomy, red rubber from ileostomy to small bowel anastomosis; vicryl bridging mesh on (7/5) transferred to SICU postoperatively for hemodynamic monitoring, with hospital course complicated by periods of AMS which resolved but now stepped back up for to SICU on 9/10 for acute AMS, intermittent hypoglycemia, AFib with RVR. CT abd 9/10 showing distended gallbladder with sludge.  S/P IR percutaneous cholecystostomy placement 9/11/23.    Perc cony:  565 ml bilious output/24h (prior 24h 520ml). Dressing c/d/i. Flushed easily with 3cc NS.     -Continue to monitor output.  -IR to follow.

## 2023-10-02 NOTE — PROGRESS NOTE ADULT - ASSESSMENT
78 M w/ Crohn's, AFib/Flutter s/p DCCVs on amiodarone, remote ileocectomy and open appendectomy. Admitted (6/23) for SBO vs Crohns flare, s/p NGT decompression and s/p lap TRE converted to open TRE, SBR x 3, left in discontinuity with abthera vac on (6/27), RTOR for ileocolic resection, small bowel anastomosis, and abdominal wall closure on (6/28), c/b fluid collection s/p IR aspiration of perihepatic fluid on (7/3), c/b wound dehiscence s/p RTOR exlap, washout, ileocolic resection with end ileostomy, blow hole colostomy, red rubber from ileostomy to small bowel anastomosis; vicryl bridging mesh on (7/5) transferred to SICU postoperatively for hemodynamic monitoring, with hospital course complicated by periods of AMS most recently on 9/1 and associated with oliguria, severe ELVI and hyponatremia, which resolved but now stepped back up for to SICU on 9/10 for acute AMS, intermittent hypoglycemia, AFib with RVR. Perc Choley tube placed on 9/11 for enlarged GB.     -obtain nutrition labs (including B9, B12)  -monitor I&Os  -care per primary team/ICU  -surgery 4c will continue to follow

## 2023-10-02 NOTE — PROGRESS NOTE ADULT - SUBJECTIVE AND OBJECTIVE BOX
INTERVAL/OVERNIGHT EVENTS and SUBJECTIVE: No acute overnight events. Patient out of bed and into chair, also ambulating in the halls at near baseline. No nausea, vomiting, fevers, chills. Patient doing well, remains hopeful and in good spirits. He has been tolerating ice chips for moisturization and stimulation of his mouth.     POD #101  SICU Day #101    Neurologic Medications  melatonin 5 milliGRAM(s) Oral at bedtime  ondansetron Injectable 4 milliGRAM(s) IV Push every 8 hours PRN Nausea and/or Vomiting    Respiratory Medications    Cardiovascular Medications  amLODIPine   Tablet 10 milliGRAM(s) Oral daily  metoprolol tartrate Injectable 5 milliGRAM(s) IV Push every 6 hours    Gastrointestinal Medications  lipid, fat emulsion (Fish Oil and Plant Based) 20% Infusion 1.15 Gm/kG/Day IV Continuous <Continuous>  loperamide 4 milliGRAM(s) Oral every 6 hours  Parenteral Nutrition - Adult 1 Each TPN Continuous <Continuous>  Parenteral Nutrition - Adult 1 Each TPN Continuous <Continuous>    Genitourinary Medications    Hematologic/Oncologic Medications  heparin   Injectable 5000 Unit(s) SubCutaneous every 8 hours    Antimicrobial/Immunologic Medications    Endocrine/Metabolic Medications  glucagon  Injectable 1 milliGRAM(s) IntraMuscular once  levothyroxine Injectable 40 MICROGram(s) IV Push at bedtime    Topical/Other Medications  benzocaine/menthol Lozenge 2 Lozenge Oral every 4 hours PRN Sore Throat  chlorhexidine 2% Cloths 1 Application(s) Topical <User Schedule>  nystatin Powder 1 Application(s) Topical three times a day      MEDICATIONS  (PRN):  benzocaine/menthol Lozenge 2 Lozenge Oral every 4 hours PRN Sore Throat  ondansetron Injectable 4 milliGRAM(s) IV Push every 8 hours PRN Nausea and/or Vomiting      I&O's Detail    01 Oct 2023 07:01  -  02 Oct 2023 07:00  --------------------------------------------------------  IN:    Fat Emulsion (Fish Oil &amp; Plant Based) 20% Infusion: 500.4 mL    TPN (Total Parenteral Nutrition): 1992 mL  Total IN: 2492.4 mL    OUT:    Bulb (mL): 40 mL    Drain (mL): 360 mL    Drain (mL): 50 mL    Drain (mL): 565 mL    Voided (mL): 1975 mL  Total OUT: 2990 mL    Total NET: -497.6 mL      02 Oct 2023 07:01  -  02 Oct 2023 14:08  --------------------------------------------------------  IN:    TPN (Total Parenteral Nutrition): 581 mL  Total IN: 581 mL    OUT:    Bulb (mL): 60 mL    Drain (mL): 160 mL    Drain (mL): 190 mL    Voided (mL): 525 mL  Total OUT: 935 mL    Total NET: -354 mL          Vital Signs Last 24 Hrs  T(C): 36.9 (02 Oct 2023 08:30), Max: 36.9 (01 Oct 2023 21:33)  T(F): 98.4 (02 Oct 2023 08:30), Max: 98.4 (01 Oct 2023 21:33)  HR: 81 (02 Oct 2023 11:55) (81 - 88)  BP: 107/54 (02 Oct 2023 11:55) (107/54 - 126/59)  BP(mean): 76 (02 Oct 2023 11:55) (76 - 86)  RR: 20 (02 Oct 2023 11:55) (16 - 30)  SpO2: 98% (02 Oct 2023 11:55) (95% - 100%)    Parameters below as of 02 Oct 2023 11:55  Patient On (Oxygen Delivery Method): room air        GENERAL: NAD, resting comfortably in bed  HEENT: NCAT, MMM  C/V: Normal rate, normal peripheral perfusion  PULM: Nonlabored breathing, no respiratory distress,   ABD: Soft, ND, NT, no rebound tenderness, no guarding. Multiple tubes including JOYCE x 2 (serous/cream colored fluid in left, cream colored fluid in right, low output) , Dustin bag over fistula (GI contents in bag) , and cony bag for PCT (bilious, moderate output)   EXTREM: WWP, no edema,  NEURO: No focal deficits    LABS:                        9.2    7.88  )-----------( 215      ( 01 Oct 2023 06:38 )             29.3     10-01    137  |  100  |  36<H>  ----------------------------<  94  4.0   |  28  |  1.24    Ca    8.8      01 Oct 2023 06:38  Phos  4.3     10-01  Mg     2.2     10-01    TPro  8.3  /  Alb  2.5<L>  /  TBili  3.5<H>  /  DBili  2.2<H>  /  AST  79<H>  /  ALT  81<H>  /  AlkPhos  168<H>  10-01    PT/INR - ( 01 Oct 2023 06:38 )   PT: 13.3 sec;   INR: 1.17            Urinalysis Basic - ( 01 Oct 2023 06:38 )    Color: x / Appearance: x / SG: x / pH: x  Gluc: 94 mg/dL / Ketone: x  / Bili: x / Urobili: x   Blood: x / Protein: x / Nitrite: x   Leuk Esterase: x / RBC: x / WBC x   Sq Epi: x / Non Sq Epi: x / Bacteria: x        RADIOLOGY & ADDITIONAL STUDIES:

## 2023-10-02 NOTE — PROGRESS NOTE ADULT - ASSESSMENT
78M w/ Crohn's, AFib/Flutter s/p DCCVs on amiodarone, remote ileocectomy and open appy here for SBO vs Crohns flare, s/p NGT decompression and now s/p lap TRE converted to open TRE, SBR x 3, left in discontinuity with abthera vac on 6/27, RTOR for ileocolic resection, small bowel anastomosis, and abdominal wall closure on 6/28, and s/p IR aspiration of perihepatic fluid on 7/3, stepped down to telemetry on 7/4. wound dehiscence on 7/5, RTOR ex-lap, washout, ileocolic resection with end ileostomy, blow hole colostomy, red rubber from ileostomy to small bowel anastomosis; Vicryl bridging mesh on 7/5. Transferred to SICU post op for HD monitoring. Extubated 7/6. Surgical wound with decreasing in size and granulating with Vac. Tentative plan for skin graft per Plastics; timing TBD pending proper wound shrinkage and granulation   8/23: Upgraded to SICU 8/23 for acute delirium and tremors, r/o sepsis vs metabolic encephalopathy. Ammonia levels normal    8/25: Spiked temp to 101.3 overnight w/ new leukocytosis to 14   8/28: Downgraded from SICU. No growth from blood cultures. Abx x 5 days until 8/29 8/30: Leukocytosis resolved   9/1: Decreased UOP, soft pressures. Cr 3.08 from 1.12. C/f dehydration 2/2 high fistula output vs ELVI   9/2: Upgraded to SICU for worsening ELVI. Stepped down 9/6   9/10: Upgraded to SICU for AMS, hypoglycemia and A-fib.   9/11: In setting of transaminitis, hyperbilirubinemia, leukocytosis and GB distention, underwent IR perc cony     Hemodynamically normal and afebrile. Low fistula output. Skin graft will not be feasible per plastics given position of fistula/ostomy over wound bed.     CT A/P 9/20 unremarkable   AXR 9/20 showed contrast pooling in stomach c/f gastroparesis. On Erythromycin. Nausea resolved   Mild persistent hyperbilirubinemia and transaminitis     Plan:   - Continue Loperamide and Octreotide; hold off on Lomotil    - Appreciate wound care management twice weekly  - NPO/IVF/TPN  - Monitor perc cony, ostomy and JOYCE drain output   - PRN pain and nausea control   - Hx of A-fib/flutter; continue Lopressor. Appreciate EP input  - DVT chemo & mechanical ppx   - OOB/PT. Patient encouraged to ambulate as much as tolerated.

## 2023-10-02 NOTE — PROGRESS NOTE ADULT - SUBJECTIVE AND OBJECTIVE BOX
SUBJECTIVE: No complaints    amLODIPine   Tablet 10 milliGRAM(s) Oral daily  heparin   Injectable 5000 Unit(s) SubCutaneous every 8 hours  metoprolol tartrate Injectable 5 milliGRAM(s) IV Push every 6 hours    MEDICATIONS  (PRN):  benzocaine/menthol Lozenge 2 Lozenge Oral every 4 hours PRN Sore Throat  ondansetron Injectable 4 milliGRAM(s) IV Push every 8 hours PRN Nausea and/or Vomiting      I&O's Detail    01 Oct 2023 07:01  -  02 Oct 2023 07:00  --------------------------------------------------------  IN:    Fat Emulsion (Fish Oil &amp; Plant Based) 20% Infusion: 500.4 mL    TPN (Total Parenteral Nutrition): 1992 mL  Total IN: 2492.4 mL    OUT:    Bulb (mL): 40 mL    Drain (mL): 360 mL    Drain (mL): 50 mL    Drain (mL): 565 mL    Voided (mL): 1975 mL  Total OUT: 2990 mL    Total NET: -497.6 mL      02 Oct 2023 07:01  -  02 Oct 2023 10:36  --------------------------------------------------------  IN:    TPN (Total Parenteral Nutrition): 249 mL  Total IN: 249 mL    OUT:    Drain (mL): 10 mL    Drain (mL): 80 mL  Total OUT: 90 mL    Total NET: 159 mL          Vital Signs Last 24 Hrs  T(C): 36.9 (02 Oct 2023 08:30), Max: 36.9 (01 Oct 2023 21:33)  T(F): 98.4 (02 Oct 2023 08:30), Max: 98.4 (01 Oct 2023 21:33)  HR: 87 (02 Oct 2023 08:01) (83 - 88)  BP: 111/58 (02 Oct 2023 08:01) (111/58 - 126/59)  BP(mean): 81 (02 Oct 2023 08:01) (81 - 86)  RR: 25 (02 Oct 2023 08:01) (16 - 30)  SpO2: 99% (02 Oct 2023 08:01) (95% - 100%)    Parameters below as of 02 Oct 2023 08:01  Patient On (Oxygen Delivery Method): room air        General: NAD, resting comfortably in bed  C/V: NSR  Pulm: Nonlabored breathing, no respiratory distress  Abd: wound manager in place, cholecystostomy tube in place w/ bilious output      LABS:                        9.2    7.88  )-----------( 215      ( 01 Oct 2023 06:38 )             29.3     10-01    137  |  100  |  36<H>  ----------------------------<  94  4.0   |  28  |  1.24    Ca    8.8      01 Oct 2023 06:38  Phos  4.3     10-01  Mg     2.2     10-01    TPro  8.3  /  Alb  2.5<L>  /  TBili  3.5<H>  /  DBili  2.2<H>  /  AST  79<H>  /  ALT  81<H>  /  AlkPhos  168<H>  10-01    PT/INR - ( 01 Oct 2023 06:38 )   PT: 13.3 sec;   INR: 1.17            Urinalysis Basic - ( 01 Oct 2023 06:38 )    Color: x / Appearance: x / SG: x / pH: x  Gluc: 94 mg/dL / Ketone: x  / Bili: x / Urobili: x   Blood: x / Protein: x / Nitrite: x   Leuk Esterase: x / RBC: x / WBC x   Sq Epi: x / Non Sq Epi: x / Bacteria: x

## 2023-10-03 NOTE — PROGRESS NOTE ADULT - ASSESSMENT
-obtain nutrition labs (B9, B12, albumin, pre-albumin)  -monitor I&Os  -care per primary team/ICU  -surgery 4c will continue to follow   78 M w/ Crohn's, AFib/Flutter s/p DCCVs on amiodarone, remote ileocectomy and open appendectomy. Admitted (6/23) for SBO vs Crohns flare, s/p NGT decompression and s/p lap TRE converted to open TRE, SBR x 3, left in discontinuity with abthera vac on (6/27), RTOR for ileocolic resection, small bowel anastomosis, and abdominal wall closure on (6/28), c/b fluid collection s/p IR aspiration of perihepatic fluid on (7/3), c/b wound dehiscence s/p RTOR exlap, washout, ileocolic resection with end ileostomy, blow hole colostomy, red rubber from ileostomy to small bowel anastomosis; vicryl bridging mesh on (7/5) transferred to SICU postoperatively for hemodynamic monitoring, with hospital course complicated by periods of AMS most recently on 9/1 and associated with oliguria, severe ELVI and hyponatremia, which resolved but now stepped back up for to SICU on 9/10 for acute AMS, intermittent hypoglycemia, AFib with RVR. Perc Choley tube placed on 9/11 for enlarged GB.     -obtain nutrition labs (B9, B12, albumin, pre-albumin)  -monitor I&Os  -care per primary team/ICU  -surgery 4c will continue to follow   78 M w/ Crohn's, AFib/Flutter s/p DCCVs on amiodarone, remote ileocectomy and open appendectomy. Admitted (6/23) for SBO vs Crohns flare, s/p NGT decompression and s/p lap TRE converted to open TRE, SBR x 3, left in discontinuity with abthera vac on (6/27), RTOR for ileocolic resection, small bowel anastomosis, and abdominal wall closure on (6/28), c/b fluid collection s/p IR aspiration of perihepatic fluid on (7/3), c/b wound dehiscence s/p RTOR exlap, washout, ileocolic resection with end ileostomy, blow hole colostomy, red rubber from ileostomy to small bowel anastomosis; vicryl bridging mesh on (7/5) transferred to SICU postoperatively for hemodynamic monitoring, with hospital course complicated by periods of AMS most recently on 9/1 and associated with oliguria, severe ELVI and hyponatremia, which resolved but now stepped back up for to SICU on 9/10 for acute AMS, intermittent hypoglycemia, AFib with RVR. Perc Choley tube placed on 9/11 for enlarged GB.     -obtain nutrition labs (B9, B12, albumin, pre-albumin)  -monitor I&Os  -care per primary team/SICU  -surgery 4c will continue to follow

## 2023-10-03 NOTE — PROGRESS NOTE ADULT - ASSESSMENT
78 M w/ Crohn's, AFib/Flutter s/p DCCVs on amiodarone, remote ileocectomy and open appendectomy. Admitted (6/23) for SBO vs Crohns flare, s/p NGT decompression and s/p lap TRE converted to open TRE, SBR x 3, left in discontinuity with abthera vac on (6/27), RTOR for ileocolic resection, small bowel anastomosis, and abdominal wall closure on (6/28), c/b fluid collection s/p IR aspiration of perihepatic fluid on (7/3), c/b wound dehiscence s/p RTOR exlap, washout, ileocolic resection with end ileostomy, blow hole colostomy, red rubber from ileostomy to small bowel anastomosis; vicryl bridging mesh on (7/5) transferred to SICU postoperatively for hemodynamic monitoring, with hospital course complicated by periods of AMS most recently on 9/1 and associated with oliguria, severe ELVI and hyponatremia, which resolved but now stepped back up for to SICU on 9/10 for acute AMS, intermittent hypoglycemia, AFib with RVR. Perc Choley tube placed on 9/11 for enlarged GB.     Consult wound care team for wound  today (typically M/F)  Encourage OOB/ambulation  Rest of care per SICU team

## 2023-10-03 NOTE — PROGRESS NOTE ADULT - SUBJECTIVE AND OBJECTIVE BOX
INTERVAL/OVERNIGHT EVENTS + SUBJECTIVE: No acute events overnight. Patient doing well, no fevers, chills, nausea or vomiting. Patient tolerating ice chips and sips without difficulty. He has not required anti emetics or lidocaine lozenges recently    POD #102  SICU Day #102    Neurologic Medications  ondansetron Injectable 4 milliGRAM(s) IV Push every 8 hours PRN Nausea and/or Vomiting    Respiratory Medications    Cardiovascular Medications  amLODIPine   Tablet 10 milliGRAM(s) Oral daily  metoprolol tartrate Injectable 5 milliGRAM(s) IV Push every 6 hours    Gastrointestinal Medications  lipid, fat emulsion (Fish Oil and Plant Based) 20% Infusion 1.15 Gm/kG/Day IV Continuous <Continuous>  loperamide 4 milliGRAM(s) Oral every 6 hours  Parenteral Nutrition - Adult 1 Each TPN Continuous <Continuous>  Parenteral Nutrition - Adult 1 Each TPN Continuous <Continuous>    Genitourinary Medications    Hematologic/Oncologic Medications  heparin   Injectable 5000 Unit(s) SubCutaneous every 8 hours  influenza  Vaccine (HIGH DOSE) 0.7 milliLiter(s) IntraMuscular once    Antimicrobial/Immunologic Medications    Endocrine/Metabolic Medications  glucagon  Injectable 1 milliGRAM(s) IntraMuscular once  levothyroxine Injectable 40 MICROGram(s) IV Push at bedtime    Topical/Other Medications  chlorhexidine 2% Cloths 1 Application(s) Topical <User Schedule>  nystatin Powder 1 Application(s) Topical three times a day      MEDICATIONS  (PRN):  ondansetron Injectable 4 milliGRAM(s) IV Push every 8 hours PRN Nausea and/or Vomiting      I&O's Detail    02 Oct 2023 07:01  -  03 Oct 2023 07:00  --------------------------------------------------------  IN:    Fat Emulsion (Fish Oil &amp; Plant Based) 20% Infusion: 500.4 mL    Oral Fluid: 120 mL    TPN (Total Parenteral Nutrition): 1992 mL  Total IN: 2612.4 mL    OUT:    Bulb (mL): 80 mL    Drain (mL): 80 mL    Drain (mL): 315 mL    Drain (mL): 735 mL    Voided (mL): 2075 mL  Total OUT: 3285 mL    Total NET: -672.6 mL      03 Oct 2023 07:01  -  03 Oct 2023 14:01  --------------------------------------------------------  IN:    Sodium Chloride 0.9% Bolus: 2000 mL    TPN (Total Parenteral Nutrition): 498 mL  Total IN: 2498 mL    OUT:    Bulb (mL): 25 mL    Drain (mL): 50 mL    Drain (mL): 300 mL    Voided (mL): 200 mL  Total OUT: 575 mL    Total NET: 1923 mL          Vital Signs Last 24 Hrs  T(C): 36.9 (03 Oct 2023 12:30), Max: 37 (03 Oct 2023 05:36)  T(F): 98.5 (03 Oct 2023 12:30), Max: 98.6 (03 Oct 2023 05:36)  HR: 84 (03 Oct 2023 12:30) (84 - 89)  BP: 142/65 (03 Oct 2023 12:30) (104/57 - 151/69)  BP(mean): 94 (03 Oct 2023 12:30) (77 - 99)  RR: 15 (03 Oct 2023 12:30) (12 - 29)  SpO2: 100% (03 Oct 2023 12:30) (98% - 100%)    Parameters below as of 03 Oct 2023 12:30  Patient On (Oxygen Delivery Method): room air        GENERAL: NAD, resting comfortably in bed  HEENT: NCAT, MMM  C/V: Normal rate, normal peripheral perfusion  PULM: Nonlabored breathing, no respiratory distress,   ABD: Soft, ND, NT, no rebound tenderness, no guarding  EXTREM: WWP, no edema, SCDs in place  NEURO: No focal deficits    LABS:                        8.9    8.41  )-----------( 190      ( 03 Oct 2023 06:47 )             28.3     10-03    135  |  100  |  36<H>  ----------------------------<  114<H>  3.9   |  28  |  1.31<H>    Ca    8.7      03 Oct 2023 06:47  Phos  3.4     10-03  Mg     2.1     10-03    TPro  8.2  /  Alb  2.5<L>  /  TBili  3.2<H>  /  DBili  x   /  AST  72<H>  /  ALT  81<H>  /  AlkPhos  158<H>  10-03      Urinalysis Basic - ( 03 Oct 2023 06:47 )    Color: x / Appearance: x / SG: x / pH: x  Gluc: 114 mg/dL / Ketone: x  / Bili: x / Urobili: x   Blood: x / Protein: x / Nitrite: x   Leuk Esterase: x / RBC: x / WBC x   Sq Epi: x / Non Sq Epi: x / Bacteria: x        RADIOLOGY & ADDITIONAL STUDIES:

## 2023-10-03 NOTE — PROGRESS NOTE ADULT - ASSESSMENT
78M w/ Crohn's, AFib/Flutter s/p DCCVs on amiodarone, remote ileocectomy and open appy here for SBO vs Crohns flare, s/p NGT decompression and now s/p lap TRE converted to open TRE, SBR x 3, left in discontinuity with abthera vac on 6/27, RTOR for ileocolic resection, small bowel anastomosis, and abdominal wall closure on 6/28, and s/p IR aspiration of perihepatic fluid on 7/3, stepped down to telemetry on 7/4. wound dehiscence on 7/5, RTOR ex-lap, washout, ileocolic resection with end ileostomy, blow hole colostomy, red rubber from ileostomy to small bowel anastomosis; Vicryl bridging mesh on 7/5. Transferred to SICU post op for HD monitoring. Extubated 7/6. Surgical wound with decreasing in size and granulating with Vac. Tentative plan for skin graft per Plastics; timing TBD pending proper wound shrinkage and granulation   8/23: Upgraded to SICU 8/23 for acute delirium and tremors, r/o sepsis vs metabolic encephalopathy. Ammonia levels normal    8/25: Spiked temp to 101.3 overnight w/ new leukocytosis to 14   8/28: Downgraded from SICU. No growth from blood cultures. Abx x 5 days until 8/29 8/30: Leukocytosis resolved   9/1: Decreased UOP, soft pressures. Cr 3.08 from 1.12. C/f dehydration 2/2 high fistula output vs ELVI   9/2: Upgraded to SICU for worsening ELVI. Stepped down 9/6   9/10: Upgraded to SICU for AMS, hypoglycemia and A-fib.   9/11: In setting of transaminitis, hyperbilirubinemia, leukocytosis and GB distention, underwent IR perc cony     Hemodynamically normal and afebrile. Low fistula output. Skin graft will not be feasible per plastics given position of fistula/ostomy over wound bed.     CT A/P 9/20 unremarkable   AXR 9/20 showed contrast pooling in stomach c/f gastroparesis. On Erythromycin. Nausea resolved   Mild persistent hyperbilirubinemia and transaminitis on labs.  -Cr elevated to 1.3 from 1.24 prior with a consistent rise in BUN (28 to 36). Will increase patient's     Plan:   - Continue Loperamide and Octreotide; hold off on Lomotil    - Increase fluids in TPN to 2500ml in order to address elevation in creatinine  - 2 L bolus normal saline today  - Appreciate wound care management twice weekly  - NPO/IVF/TPN- with ice chips and some sips   - Monitor perc cony, ostomy and JOYCE drain output. LUQ drain decreasing output  - PRN pain and nausea control   - Hx of A-fib/flutter; continue Lopressor, can increase to 7.5mg per EP if patient has SVT  - DVT chemo & mechanical ppx   - OOB/PT. Patient encouraged to ambulate as much as tolerated.

## 2023-10-03 NOTE — PROGRESS NOTE ADULT - ATTENDING COMMENTS
Crohn's, AF S/P SBR, ileocolic resection, ileostomy, enteroatmospheric fistula  physical as above  continue TPN, increase volume to 2500 ml  bolus 2 liters today as looks dry with higher BUN  rest as above

## 2023-10-03 NOTE — PROGRESS NOTE ADULT - SUBJECTIVE AND OBJECTIVE BOX
SUBJECTIVE:  Patient was evaluated at bedside this AM by general surgery team. Patient is in good spirits this AM and feeling well. Patient reports working with PT yesterday. Patient denies seeing wound care team yesterday.     MEDICATIONS  (STANDING):  amLODIPine   Tablet 10 milliGRAM(s) Oral daily  chlorhexidine 2% Cloths 1 Application(s) Topical <User Schedule>  glucagon  Injectable 1 milliGRAM(s) IntraMuscular once  heparin   Injectable 5000 Unit(s) SubCutaneous every 8 hours  influenza  Vaccine (HIGH DOSE) 0.7 milliLiter(s) IntraMuscular once  levothyroxine Injectable 40 MICROGram(s) IV Push at bedtime  lipid, fat emulsion (Fish Oil and Plant Based) 20% Infusion 1.15 Gm/kG/Day (41.67 mL/Hr) IV Continuous <Continuous>  loperamide 4 milliGRAM(s) Oral every 6 hours  melatonin 5 milliGRAM(s) Oral at bedtime  metoprolol tartrate Injectable 5 milliGRAM(s) IV Push every 6 hours  nystatin Powder 1 Application(s) Topical three times a day  Parenteral Nutrition - Adult 1 Each (83 mL/Hr) TPN Continuous <Continuous>    MEDICATIONS  (PRN):  benzocaine/menthol Lozenge 2 Lozenge Oral every 4 hours PRN Sore Throat  ondansetron Injectable 4 milliGRAM(s) IV Push every 8 hours PRN Nausea and/or Vomiting      Vital Signs Last 24 Hrs  T(C): 37 (03 Oct 2023 05:36), Max: 37 (03 Oct 2023 05:36)  T(F): 98.6 (03 Oct 2023 05:36), Max: 98.6 (03 Oct 2023 05:36)  HR: 87 (03 Oct 2023 03:30) (81 - 89)  BP: 117/56 (03 Oct 2023 03:30) (104/57 - 151/69)  BP(mean): 80 (03 Oct 2023 03:30) (76 - 99)  RR: 12 (03 Oct 2023 03:30) (12 - 29)  SpO2: 98% (03 Oct 2023 03:30) (98% - 100%)    Parameters below as of 03 Oct 2023 03:30  Patient On (Oxygen Delivery Method): room air        Physical Exam:  General: NAD, resting comfortably in bed  C/V: NSR  Pulm: Nonlabored breathing, no respiratory distress  Abd: wound manager in place, cholecystostomy tube in place w/ bilious output  Extremities: WWP  Neuro: A/O x 3, CNs II-XII grossly intact  Pulses: palpable distal pulses    I&O's Summary    02 Oct 2023 07:01  -  03 Oct 2023 07:00  --------------------------------------------------------  IN: 2409.4 mL / OUT: 2995 mL / NET: -585.6 mL        LABS:                        8.9    8.41  )-----------( 190      ( 03 Oct 2023 06:47 )             28.3               CAPILLARY BLOOD GLUCOSE            RADIOLOGY & ADDITIONAL STUDIES:

## 2023-10-03 NOTE — CHART NOTE - NSCHARTNOTEFT_GEN_A_CORE
Admitting Diagnosis:   Patient is a 78y old  Male who presents with a chief complaint of entero-atmospheric fistula (02 Oct 2023 14:07)      PAST MEDICAL & SURGICAL HISTORY:  Essential hypertension  Hypertension      Atrial fibrillation  s/p cardioversion 2010 and 2014  Pt. reports 4 DCCV  Now on Amiodarone 200mg PO bid and Eliquis 5mg PO bid  Last DCCV 4 yrs ago at [facility]      Crohn's disease  s/p partial resection of ileum      Hyperlipidemia      Hypothyroidism      History of depression  On Venlafaxine ER 150mg PO bid      Junctional rhythm      H/O knee surgery      History of cataract surgery          Current Nutrition Order: NPO; TPN via PICC- 365g Dex, 125g AA, 100g SMOFlipids; provides 2741 kcals, 125g protein, GIR 3.03 mg/kg/min    PO Intake: Good (%) [   ]  Fair (50-75%) [   ] Poor (<25%) [   ]- N/A    GI Issues: abd wound & fistula, RLQ ostomy, likely delayed gastric emptying per CT    Pain: no pain/discomfort noted    Skin Integrity: JOYCE drain x 2, mid abd wound, EC fistula, ileostomy, cony bag, L forearm skin tear. Rudy score 18    Labs:   10-03    135  |  100  |  36<H>  ----------------------------<  114<H>  3.9   |  28  |  1.31<H>    Ca    8.7      03 Oct 2023 06:47  Phos  3.4     10-03  Mg     2.1     10-03    TPro  8.2  /  Alb  2.5<L>  /  TBili  3.2<H>  /  DBili  x   /  AST  72<H>  /  ALT  81<H>  /  AlkPhos  158<H>  10-03    CAPILLARY BLOOD GLUCOSE          Medications:  MEDICATIONS  (STANDING):  amLODIPine   Tablet 10 milliGRAM(s) Oral daily  chlorhexidine 2% Cloths 1 Application(s) Topical <User Schedule>  glucagon  Injectable 1 milliGRAM(s) IntraMuscular once  heparin   Injectable 5000 Unit(s) SubCutaneous every 8 hours  influenza  Vaccine (HIGH DOSE) 0.7 milliLiter(s) IntraMuscular once  levothyroxine Injectable 40 MICROGram(s) IV Push at bedtime  lipid, fat emulsion (Fish Oil and Plant Based) 20% Infusion 1.15 Gm/kG/Day (41.67 mL/Hr) IV Continuous <Continuous>  loperamide 4 milliGRAM(s) Oral every 6 hours  metoprolol tartrate Injectable 5 milliGRAM(s) IV Push every 6 hours  nystatin Powder 1 Application(s) Topical three times a day  Parenteral Nutrition - Adult 1 Each (104 mL/Hr) TPN Continuous <Continuous>  Parenteral Nutrition - Adult 1 Each (83 mL/Hr) TPN Continuous <Continuous>    MEDICATIONS  (PRN):  ondansetron Injectable 4 milliGRAM(s) IV Push every 8 hours PRN Nausea and/or Vomiting      Weight: 83.5kg [03 Oct 2023]  Daily 84.5kg [2 Oct 2023]  Daily 87.2kg [1 Oct 2023]  Daily 88.3kg [29 Sep 2023]  Daily 89.9kg [28 Sep 2023]  Weight: 87.7kg [24 Sep 2023]  Daily 90.4kg [21 Sep 2023]  Daily 91.4kg [20 Sep 2023]  Daily 86.7kg [18 Sep 2023]  Daily 88.1kg [16 Sep 2023]  Daily 88.9kg [15 Sep 2023]   Daily 89.1 [14 Sep 2023]  Daily 92.9kg [6 Sep 2023]  Daily 91.8kg [27 Aug 2023]  Daily 101kg [9 Aug 2023]  Daily   102.1kg [10 July 2023]  Daily 99.9kg [9 July 2023]    Weight Change:  weight trending down throughout admission. Recommend continue weekly weights for trending / ensuring adequacy of nutrition provision    IBW: 86.4kg   % IBW: 96.6%    Estimated energy needs:   Current body wt [83.5kg] used for energy calculations as pt <100% IBW. Needs estimated for age and adjusted for current clinical status, increased needs for post-op & open abd wound healing    Calories: 7388-4093.5 kcals based on 30-35 kcals/kg  Protein: 125.3-167 g protein based on 1.5-2.0g protein/kg  *Fluid needs per team    Subjective:   78M w/ Crohn's, AFib/Flutter s/p DCCVs on amiodarone, remote ileocectomy and open appy here for SBO vs Crohns flare, s/p NGT decompression and now s/p lap TRE converted to open TRE, SBR x 3, left in discontinuity with abthera vac on 6/27, RTOR for ileocolic resection, small bowel anastomosis, and abdominal wall closure on 6/28, and s/p IR aspiration of perihepatic fluid on 7/3, stepped down to telemetry on 7/4. wound dehiscence on 7/5, RTOR ex-lap, washout, ileocolic resection with end ileostomy, blow hole colostomy, red rubber from ileostomy to small bowel anastomosis; Vicryl bridging mesh on 7/5. Transferred to SICU post op for HD monitoring. Extubated 7/6 and SDU 8/2. Has been recovering on telemetry with ECF management and prolonged TPN. Upgraded to SICU 8/23 for acute delirium and tremors, r/o sepsis vs metabolic encephalopathy. Ammonia levels normal. TPN DC'ed and started on PO diet. Stepped down to 8 Lachman on 8/28.    Chart reviewed. Afebrile. HR WNL, BP WNL to low. MAPs >65 mmHg. Labs & meds reviewed. Pt seen with IDT today during AM rounds. Pt remains NPO with TPN as primary means to nutrition, current provision provides 32.8 kcals/kg, 1.5g protein/kg, 26.8 non-protein kcals/kg. Output x 24hrs: 2075ml uop, 80ml LUQ JOYCE drain, 735ml RUQ cony drain, 80ml RLQ drain, 315ml mid abd wound. Plan to increase TPN volume tonight to 2.5L due to elevated BUN & creat, replete losses from drains/insensible losses. Mild persistent hyperbilirubinemia and transaminitis noted. Pt tolerating ice chips, no nausea noted. Awaiting micronutrient labs.  <H> [9/29]. TPN reordered for tonight. RDN will continue to monitor, reassess, and intervene as appropriate.     Previous Nutrition Diagnosis:  Increased Nutrient Needs R/T physiological demands for nutrient AEB post-op wound healing    Active [ X  ]  Resolved [   ]    If resolved, new PES:     Goal:  Pt will meet at least 75% of protein & energy needs via most appropriate route for nutrition     Recommendations:  1. Continue NPO; TPN via PICC-  365g Dex, 125g AA, 100g SMOF lipids; provides 2741 kcals, 125g protein, GIR 3.03 mg/kg/min; provides 32.8 kcals/kg, 1.5g protein/kg, 26.8 non-protein kcals/kg  - c/w MVI, B1, Vit C, Zinc in TPN bag; hold copper & manganese  - trend renal function, adjust protein as indicated  - consider increasing calories to curb further weight loss  - closely monitor feasibility for resuming PO  2. F/u with micronutrient levels [copper, manganese, zinc]  - consider addition in TPN bag  3. Fluids & lytes per team  - TPN volume -2.5L per day  4. Weekly lipid panel [next 10/6]  - hold lipids if TG >400  5. Monitor BMP, Mg, Phos. POC BG   - monitor & replenish lytes outside TPN bag prn  6. Monitor chemistry, GI function, skin integrity  7. To continue close monitoring of clinical course & adjust recommendations prn    Education: deferred    Risk Level: High [ X  ] Moderate [   ] Low [   ]

## 2023-10-03 NOTE — ADVANCED PRACTICE NURSE CONSULT - ASSESSMENT
No issues with wound manager. Pt reports feeling well, remains NPO, on TPN. OOB to chair and walking in the hallways. Denies pain.    Abdomen:  10.5 x 7cm with 100% red granulation tissue with new epithelium to edges. Thin biofilm noted today to approximately 40% of the wound bed.  Stomatized fistula at 2 o'clock with yellow effluent. Periwound skin is intact without irritation. RLQ ileostomy stoma retracted, small less than 1 inch ovoid, peristomal skin intact. Stoma intubated with red rubber catheter.    Skin thoroughly cleansed.  Vashe soak applied to wound bed for approx 5 minutes.  Cavilon No Sting Barrier film applied to periwound, section of VAC isolator along edge of fistula, Adapt ostomy rings, stoma paste applied around wound and within creases.  Entire area pouched with an Dustin wound manager # [id number].   2 piece 1 3/4" pouch with Adapt ring applied to RLQ Ileostomy.

## 2023-10-04 NOTE — PROGRESS NOTE ADULT - ATTENDING COMMENTS
Crohn's, AF S/P SBR, ileocolic resection, ileostomy, enteroatmospheric fistula  physical as above  continue TPN, increase volume to 2500 ml  bolus 2 liters today as looks dry with higher BUN  rest as above 78 year old male with hx crohn's, atrial fibrillation s/p SBR for SBO with ileocolic resection and ileostomy, on TPN. Planned for perc cony tube study today

## 2023-10-04 NOTE — PROGRESS NOTE ADULT - SUBJECTIVE AND OBJECTIVE BOX
INTERVAL/OVERNIGHT EVENTS + SUBJECTIVE: No acute events overnight. Patient doing well, no fevers, chills, nausea or vomiting. Patient tolerating ice chips and sips without difficulty. He has not required anti emetics, melatonin, or lidocaine lozenges recently.     POD #103  SICU Day #103    Neurologic Medications  ondansetron Injectable 4 milliGRAM(s) IV Push every 8 hours PRN Nausea and/or Vomiting    Respiratory Medications    Cardiovascular Medications  amLODIPine   Tablet 10 milliGRAM(s) Oral daily  metoprolol tartrate Injectable 5 milliGRAM(s) IV Push every 6 hours    Gastrointestinal Medications  lipid, fat emulsion (Fish Oil and Plant Based) 20% Infusion 1.15 Gm/kG/Day IV Continuous <Continuous>  loperamide 4 milliGRAM(s) Oral every 6 hours  Parenteral Nutrition - Adult 1 Each TPN Continuous <Continuous>  Parenteral Nutrition - Adult 1 Each TPN Continuous <Continuous>    Genitourinary Medications    Hematologic/Oncologic Medications  heparin   Injectable 5000 Unit(s) SubCutaneous every 8 hours  influenza  Vaccine (HIGH DOSE) 0.7 milliLiter(s) IntraMuscular once    Antimicrobial/Immunologic Medications    Endocrine/Metabolic Medications  glucagon  Injectable 1 milliGRAM(s) IntraMuscular once  levothyroxine Injectable 40 MICROGram(s) IV Push at bedtime    Topical/Other Medications  chlorhexidine 2% Cloths 1 Application(s) Topical <User Schedule>  nystatin Powder 1 Application(s) Topical three times a day      MEDICATIONS  (PRN):  ondansetron Injectable 4 milliGRAM(s) IV Push every 8 hours PRN Nausea and/or Vomiting      I&O's Detail    02 Oct 2023 07:01  -  03 Oct 2023 07:00  --------------------------------------------------------  IN:    Fat Emulsion (Fish Oil &amp; Plant Based) 20% Infusion: 500.4 mL    Oral Fluid: 120 mL    TPN (Total Parenteral Nutrition): 1992 mL  Total IN: 2612.4 mL    OUT:    Bulb (mL): 80 mL    Drain (mL): 80 mL    Drain (mL): 315 mL    Drain (mL): 735 mL    Voided (mL): 2075 mL  Total OUT: 3285 mL    Total NET: -672.6 mL      03 Oct 2023 07:01  -  03 Oct 2023 14:01  --------------------------------------------------------  IN:    Sodium Chloride 0.9% Bolus: 2000 mL    TPN (Total Parenteral Nutrition): 498 mL  Total IN: 2498 mL    OUT:    Bulb (mL): 25 mL    Drain (mL): 50 mL    Drain (mL): 300 mL    Voided (mL): 200 mL  Total OUT: 575 mL    Total NET: 1923 mL          Vital Signs Last 24 Hrs  T(C): 36.9 (03 Oct 2023 12:30), Max: 37 (03 Oct 2023 05:36)  T(F): 98.5 (03 Oct 2023 12:30), Max: 98.6 (03 Oct 2023 05:36)  HR: 84 (03 Oct 2023 12:30) (84 - 89)  BP: 142/65 (03 Oct 2023 12:30) (104/57 - 151/69)  BP(mean): 94 (03 Oct 2023 12:30) (77 - 99)  RR: 15 (03 Oct 2023 12:30) (12 - 29)  SpO2: 100% (03 Oct 2023 12:30) (98% - 100%)    Parameters below as of 03 Oct 2023 12:30  Patient On (Oxygen Delivery Method): room air        GENERAL: NAD, resting comfortably in bed  HEENT: NCAT, MMM  C/V: Normal rate, normal peripheral perfusion  PULM: Nonlabored breathing, no respiratory distress,   ABD: Soft, ND, NT, no rebound tenderness, no guarding. 3 drains (RUQ, LUQ and RLQ) LUQ with minimal output: creamy in color, RUQ: moderate output bilious, and RLQ minimal output with creamy output. Dustin bag with significant output and gas.   EXTREM: WWP, no edema, SCDs in place  NEURO: No focal deficits    LABS:                        8.9    8.41  )-----------( 190      ( 03 Oct 2023 06:47 )             28.3     10-03    135  |  100  |  36<H>  ----------------------------<  114<H>  3.9   |  28  |  1.31<H>    Ca    8.7      03 Oct 2023 06:47  Phos  3.4     10-03  Mg     2.1     10-03    TPro  8.2  /  Alb  2.5<L>  /  TBili  3.2<H>  /  DBili  x   /  AST  72<H>  /  ALT  81<H>  /  AlkPhos  158<H>  10-03      Urinalysis Basic - ( 03 Oct 2023 06:47 )    Color: x / Appearance: x / SG: x / pH: x  Gluc: 114 mg/dL / Ketone: x  / Bili: x / Urobili: x   Blood: x / Protein: x / Nitrite: x   Leuk Esterase: x / RBC: x / WBC x   Sq Epi: x / Non Sq Epi: x / Bacteria: x        RADIOLOGY & ADDITIONAL STUDIES:

## 2023-10-04 NOTE — PROGRESS NOTE ADULT - ASSESSMENT
78 M w/ Crohn's, AFib/Flutter s/p DCCVs on amiodarone, remote ileocectomy and open appendectomy. Admitted (6/23) for SBO vs Crohns flare, s/p NGT decompression and s/p lap TRE converted to open TRE, SBR x 3, left in discontinuity with abthera vac on (6/27), RTOR for ileocolic resection, small bowel anastomosis, and abdominal wall closure on (6/28), c/b fluid collection s/p IR aspiration of perihepatic fluid on (7/3), c/b wound dehiscence s/p RTOR exlap, washout, ileocolic resection with end ileostomy, blow hole colostomy, red rubber from ileostomy to small bowel anastomosis; vicryl bridging mesh on (7/5) transferred to SICU postoperatively for hemodynamic monitoring, with hospital course complicated by periods of AMS most recently on 9/1 and associated with oliguria, severe ELVI and hyponatremia, which resolved but now stepped back up for to SICU on 9/10 for acute AMS, intermittent hypoglycemia, AFib with RVR. Perc Choley tube placed on 9/11 for enlarged GB.     - perc cony tube study  - take wound manager off --> wet to dry for the wound  - obtain nutrition labs (B9, B12, albumin, pre-albumin)  - monitor I&Os  - care per primary team/SICU  - surgery 4c will continue to follow   78 M w/ Crohn's, AFib/Flutter s/p DCCVs on amiodarone, remote ileocectomy and open appendectomy. Admitted (6/23) for SBO vs Crohns flare, s/p NGT decompression and s/p lap TRE converted to open TRE, SBR x 3, left in discontinuity with abthera vac on (6/27), RTOR for ileocolic resection, small bowel anastomosis, and abdominal wall closure on (6/28), c/b fluid collection s/p IR aspiration of perihepatic fluid on (7/3), c/b wound dehiscence s/p RTOR exlap, washout, ileocolic resection with end ileostomy, blow hole colostomy, red rubber from ileostomy to small bowel anastomosis; vicryl bridging mesh on (7/5) transferred to SICU postoperatively for hemodynamic monitoring, with hospital course complicated by periods of AMS most recently on 9/1 and associated with oliguria, severe ELVI and hyponatremia, which resolved but now stepped back up for to SICU on 9/10 for acute AMS, intermittent hypoglycemia, AFib with RVR. Perc Choley tube placed on 9/11 for enlarged GB.     - perc cony tube study  - take wound manager off --> wet to dry for the wound  - monitor I&Os  - care per primary team/SICU  - surgery 4c will continue to follow

## 2023-10-04 NOTE — PROGRESS NOTE ADULT - ASSESSMENT
78 M w/ Crohn's, AFib/Flutter s/p DCCVs on amiodarone, remote ileocectomy and open appendectomy. Admitted (6/23) for SBO vs Crohns flare, s/p NGT decompression and s/p lap TRE converted to open TRE, SBR x 3, left in discontinuity with abthera vac on (6/27), RTOR for ileocolic resection, small bowel anastomosis, and abdominal wall closure on (6/28), c/b fluid collection s/p IR aspiration of perihepatic fluid on (7/3), c/b wound dehiscence s/p RTOR exlap, washout, ileocolic resection with end ileostomy, blow hole colostomy, red rubber from ileostomy to small bowel anastomosis; vicryl bridging mesh on (7/5) transferred to SICU postoperatively for hemodynamic monitoring, with hospital course complicated by periods of AMS most recently on 9/1 and associated with oliguria, severe ELVI and hyponatremia, which resolved but now stepped back up for to SICU on 9/10 for acute AMS, intermittent hypoglycemia, AFib with RVR. Perc Choley tube placed on 9/11 for enlarged GB.       Encourage OOB/ambulation  Rest of care per SICU team   78 M w/ Crohn's, AFib/Flutter s/p DCCVs on amiodarone, remote ileocectomy and open appendectomy. Admitted (6/23) for SBO vs Crohns flare, s/p NGT decompression and s/p lap RTE converted to open TRE, SBR x 3, left in discontinuity with abthera vac on (6/27), RTOR for ileocolic resection, small bowel anastomosis, and abdominal wall closure on (6/28), c/b fluid collection s/p IR aspiration of perihepatic fluid on (7/3), c/b wound dehiscence s/p RTOR exlap, washout, ileocolic resection with end ileostomy, blow hole colostomy, red rubber from ileostomy to small bowel anastomosis; vicryl bridging mesh on (7/5) transferred to SICU postoperatively for hemodynamic monitoring, with hospital course complicated by periods of AMS most recently on 9/1 and associated with oliguria, severe ELVI and hyponatremia, which resolved but now stepped back up for to SICU on 9/10 for acute AMS, intermittent hypoglycemia, AFib with RVR. Perc Choley tube placed on 9/11 for enlarged GB.       Encourage OOB/ambulation  Perc cony tube study and consider capping if patent flow to duodenum  Increase imodium gradually to max dose  Rest of care per SICU team

## 2023-10-04 NOTE — PROGRESS NOTE ADULT - SUBJECTIVE AND OBJECTIVE BOX
SUBJECTIVE:  Patient was evaluated at bedside this AM by general surgery team. Patient is in good spirits and is hopeful to go outside today. Patient denies pain, nausea, or vomiting overnight. Patient had wound  yesterday.     MEDICATIONS  (STANDING):  amLODIPine   Tablet 10 milliGRAM(s) Oral daily  chlorhexidine 2% Cloths 1 Application(s) Topical <User Schedule>  glucagon  Injectable 1 milliGRAM(s) IntraMuscular once  heparin   Injectable 5000 Unit(s) SubCutaneous every 8 hours  influenza  Vaccine (HIGH DOSE) 0.7 milliLiter(s) IntraMuscular once  levothyroxine Injectable 40 MICROGram(s) IV Push at bedtime  lipid, fat emulsion (Fish Oil and Plant Based) 20% Infusion 1.15 Gm/kG/Day (41.67 mL/Hr) IV Continuous <Continuous>  loperamide 4 milliGRAM(s) Oral every 6 hours  metoprolol tartrate Injectable 5 milliGRAM(s) IV Push every 6 hours  nystatin Powder 1 Application(s) Topical three times a day  Parenteral Nutrition - Adult 1 Each (104 mL/Hr) TPN Continuous <Continuous>  Parenteral Nutrition - Adult 1 Each (104 mL/Hr) TPN Continuous <Continuous>    MEDICATIONS  (PRN):  ondansetron Injectable 4 milliGRAM(s) IV Push every 8 hours PRN Nausea and/or Vomiting      Vital Signs Last 24 Hrs  T(C): 36.6 (04 Oct 2023 08:33), Max: 37.1 (03 Oct 2023 17:10)  T(F): 97.8 (04 Oct 2023 08:33), Max: 98.8 (03 Oct 2023 17:10)  HR: 88 (04 Oct 2023 10:06) (84 - 91)  BP: 107/58 (04 Oct 2023 10:06) (107/58 - 156/68)  BP(mean): 79 (04 Oct 2023 10:06) (79 - 106)  RR: 22 (04 Oct 2023 10:06) (13 - 27)  SpO2: 100% (04 Oct 2023 10:06) (98% - 100%)    Parameters below as of 04 Oct 2023 10:06  Patient On (Oxygen Delivery Method): room air        Physical Exam:  General: NAD, resting comfortably in bed  C/V: NSR  Pulm: Nonlabored breathing, no respiratory distress  Abd: Soft, ND, NT, no rebound tenderness, no guarding. Wound manager over midline wound with fistula/ostomy. RLQ ileostomy, blanca donnelly JP in LUQ       I&O's Summary    03 Oct 2023 07:01  -  04 Oct 2023 07:00  --------------------------------------------------------  IN: 5149.1 mL / OUT: 3265 mL / NET: 1884.1 mL    04 Oct 2023 07:01  -  04 Oct 2023 11:18  --------------------------------------------------------  IN: 312 mL / OUT: 600 mL / NET: -288 mL        LABS:                        8.9    8.41  )-----------( 190      ( 03 Oct 2023 06:47 )             28.3     10-03    135  |  100  |  36<H>  ----------------------------<  114<H>  3.9   |  28  |  1.31<H>    Ca    8.7      03 Oct 2023 06:47  Phos  3.4     10-03  Mg     2.1     10-03    TPro  8.2  /  Alb  2.5<L>  /  TBili  3.2<H>  /  DBili  x   /  AST  72<H>  /  ALT  81<H>  /  AlkPhos  158<H>  10-03      Urinalysis Basic - ( 03 Oct 2023 06:47 )    Color: x / Appearance: x / SG: x / pH: x  Gluc: 114 mg/dL / Ketone: x  / Bili: x / Urobili: x   Blood: x / Protein: x / Nitrite: x   Leuk Esterase: x / RBC: x / WBC x   Sq Epi: x / Non Sq Epi: x / Bacteria: x      CAPILLARY BLOOD GLUCOSE        LIVER FUNCTIONS - ( 03 Oct 2023 06:47 )  Alb: 2.5 g/dL / Pro: 8.2 g/dL / ALK PHOS: 158 U/L / ALT: 81 U/L / AST: 72 U/L / GGT: x             RADIOLOGY & ADDITIONAL STUDIES:

## 2023-10-04 NOTE — PROGRESS NOTE ADULT - ASSESSMENT
78M w/ Crohn's, AFib/Flutter s/p DCCVs on amiodarone, remote ileocectomy and open appy here for SBO vs Crohns flare, s/p NGT decompression and now s/p lap TRE converted to open TRE, SBR x 3, left in discontinuity with abthera vac on 6/27, RTOR for ileocolic resection, small bowel anastomosis, and abdominal wall closure on 6/28, and s/p IR aspiration of perihepatic fluid on 7/3, stepped down to telemetry on 7/4. wound dehiscence on 7/5, RTOR ex-lap, washout, ileocolic resection with end ileostomy, blow hole colostomy, red rubber from ileostomy to small bowel anastomosis; Vicryl bridging mesh on 7/5. Transferred to SICU post op for HD monitoring. Extubated 7/6. Surgical wound with decreasing in size and granulating with Vac. Tentative plan for skin graft per Plastics; timing TBD pending proper wound shrinkage and granulation   8/23: Upgraded to SICU 8/23 for acute delirium and tremors, r/o sepsis vs metabolic encephalopathy. Ammonia levels normal    8/25: Spiked temp to 101.3 overnight w/ new leukocytosis to 14   8/28: Downgraded from SICU. No growth from blood cultures. Abx x 5 days until 8/29 8/30: Leukocytosis resolved   9/1: Decreased UOP, soft pressures. Cr 3.08 from 1.12. C/f dehydration 2/2 high fistula output vs ELVI   9/2: Upgraded to SICU for worsening ELVI. Stepped down 9/6   9/10: Upgraded to SICU for AMS, hypoglycemia and A-fib.   9/11: In setting of transaminitis, hyperbilirubinemia, leukocytosis and GB distention, underwent IR perc cony     Hemodynamically normal and afebrile. Low fistula output. Skin graft will not be feasible per plastics given position of fistula/ostomy over wound bed.     CT A/P 9/20 unremarkable   AXR 9/20 showed contrast pooling in stomach c/f gastroparesis. On Erythromycin. Nausea resolved   Mild persistent hyperbilirubinemia and transaminitis on labs.  -Cr elevated to 1.3 from 1.24 prior with a consistent rise in BUN (28 to 36). Will increase patient's TPN fluids to aid in hydration. Encourage ice chips and sips.     Plan:   - Continue Loperamide; hold off on Lomotil    - Increase fluids in TPN to 2500ml in order to address elevation in creatinine, labs to be checked tomorrow 10/5. Will reassess creatinine.   - Appreciate wound care management twice weekly  - NPO/IVF/TPN- with ice chips and some sips   - Monitor perc cony, ostomy and JOYCE drain output. LUQ drain decreasing output  - consider R perc cony drain study to evaluate amount of drainage into duodenum and the possibility of oral feeds  - PRN pain and nausea control   - Hx of A-fib/flutter; continue Lopressor, can increase to 7.5mg per EP if patient has SVT  - DVT chemo & mechanical ppx   - OOB/PT. Patient encouraged to ambulate as much as tolerated.

## 2023-10-04 NOTE — PROGRESS NOTE ADULT - SUBJECTIVE AND OBJECTIVE BOX
SUBJECTIVE: Patient seen and examined at bedside. No complaints.    amLODIPine   Tablet 10 milliGRAM(s) Oral daily  heparin   Injectable 5000 Unit(s) SubCutaneous every 8 hours  metoprolol tartrate Injectable 5 milliGRAM(s) IV Push every 6 hours    MEDICATIONS  (PRN):  ondansetron Injectable 4 milliGRAM(s) IV Push every 8 hours PRN Nausea and/or Vomiting      I&O's Detail    03 Oct 2023 07:01  -  04 Oct 2023 07:00  --------------------------------------------------------  IN:    Fat Emulsion (Fish Oil &amp; Plant Based) 20% Infusion: 542.1 mL    IV PiggyBack: 180 mL    Oral Fluid: 120 mL    Sodium Chloride 0.9% Bolus: 2000 mL    TPN (Total Parenteral Nutrition): 2307 mL  Total IN: 5149.1 mL    OUT:    Bulb (mL): 45 mL    Drain (mL): 50 mL    Drain (mL): 690 mL    Drain (mL): 455 mL    Voided (mL): 2025 mL  Total OUT: 3265 mL    Total NET: 1884.1 mL      04 Oct 2023 07:01  -  04 Oct 2023 10:42  --------------------------------------------------------  IN:    TPN (Total Parenteral Nutrition): 312 mL  Total IN: 312 mL    OUT:    Drain (mL): 100 mL    Drain (mL): 100 mL    Voided (mL): 400 mL  Total OUT: 600 mL    Total NET: -288 mL      T(C): 36.6 (10-04-23 @ 08:33), Max: 37.1 (10-03-23 @ 17:10)  HR: 88 (10-04-23 @ 10:06) (84 - 91)  BP: 107/58 (10-04-23 @ 10:06) (107/58 - 156/68)  RR: 22 (10-04-23 @ 10:06) (13 - 27)  SpO2: 100% (10-04-23 @ 10:06) (98% - 100%)    General: NAD, resting comfortably in bed  C/V: NSR  Pulm: Nonlabored breathing, no respiratory distress  Abd: Soft, ND, NT, no rebound tenderness, no guarding. Wound manager over midline wound with fistula/ostomy. RLQ ileostomy, perc cony, JOYCE in LUQ. LUQ fistula/ostomy.      LABS:                        8.9    8.41  )-----------( 190      ( 03 Oct 2023 06:47 )             28.3     10-03    135  |  100  |  36<H>  ----------------------------<  114<H>  3.9   |  28  |  1.31<H>    Ca    8.7      03 Oct 2023 06:47  Phos  3.4     10-03  Mg     2.1     10-03    TPro  8.2  /  Alb  2.5<L>  /  TBili  3.2<H>  /  DBili  x   /  AST  72<H>  /  ALT  81<H>  /  AlkPhos  158<H>  10-03      Urinalysis Basic - ( 03 Oct 2023 06:47 )    Color: x / Appearance: x / SG: x / pH: x  Gluc: 114 mg/dL / Ketone: x  / Bili: x / Urobili: x   Blood: x / Protein: x / Nitrite: x   Leuk Esterase: x / RBC: x / WBC x   Sq Epi: x / Non Sq Epi: x / Bacteria: x        RADIOLOGY & ADDITIONAL STUDIES:

## 2023-10-05 NOTE — PROGRESS NOTE ADULT - ASSESSMENT
78M w/ Crohn's, AFib/Flutter s/p DCCVs on amiodarone, remote ileocectomy and open appy here for SBO vs Crohns flare, s/p NGT decompression and now s/p lap TRE converted to open TRE, SBR x 3, left in discontinuity with abthera vac on 6/27, RTOR for ileocolic resection, small bowel anastomosis, and abdominal wall closure on 6/28, and s/p IR aspiration of perihepatic fluid on 7/3, stepped down to telemetry on 7/4. wound dehiscence on 7/5, RTOR ex-lap, washout, ileocolic resection with end ileostomy, blow hole colostomy, red rubber from ileostomy to small bowel anastomosis; Vicryl bridging mesh on 7/5. Transferred to SICU post op for HD monitoring. Extubated 7/6. Surgical wound with decreasing in size and granulating with Vac. Tentative plan for skin graft per Plastics; timing TBD pending proper wound shrinkage and granulation.     8/23: Upgraded to SICU 8/23 for acute delirium and tremors, r/o sepsis vs metabolic encephalopathy. Ammonia levels normal    8/25: Spiked temp to 101.3 overnight w/ new leukocytosis to 14   8/28: Downgraded from SICU. No growth from blood cultures. Abx x 5 days until 8/29 8/30: Leukocytosis resolved   9/1: Decreased UOP, soft pressures. Cr 3.08 from 1.12. C/f dehydration 2/2 high fistula output vs ELVI   9/2: Upgraded to SICU for worsening ELVI. Stepped down 9/6   9/10: Upgraded to SICU for AMS, hypoglycemia and A-fib.   9/11: In setting of transaminitis, hyperbilirubinemia, leukocytosis and GB distention, underwent IR perc cony     Hemodynamically normal and afebrile. Low fistula output. Skin graft will not be feasible per plastics given position of fistula/ostomy over wound bed.     CT A/P 9/20 unremarkable   AXR 9/20 showed contrast pooling in stomach c/f gastroparesis. On Erythromycin. Nausea resolved   Mild persistent hyperbilirubinemia and transaminitis on labs.  -Cr elevated to 1.3 from 1.24 prior with a consistent rise in BUN (28 to 36). Will increase patient's TPN fluids to aid in hydration. Encourage ice chips and sips.     Plan:   - Continue Loperamide and Lomotil    - Increase fluids in TPN to 2500ml in order to address elevation in creatinine, labs to be checked tomorrow 10/5. Will reassess creatinine which has now decreased to 1.18 from 1.31.   - Appreciate wound care management twice weekly  - NPO/IVF/TPN- with ice chips and some sips   - Monitor perc cony, ostomy and JOYCE drain output. LUQ drain decreasing output. Tube study yesterday shows contrast flowing into the duodenum. R cony tube capped. Bilious output in abdominal drains.    - PRN pain and nausea control   - Hx of A-fib/flutter; continue Lopressor, can increase to 7.5mg per EP if patient has SVT  - DVT chemo & mechanical ppx   - OOB/PT. Patient encouraged to ambulate as much as tolerated.

## 2023-10-05 NOTE — PROGRESS NOTE ADULT - ASSESSMENT
78 M w/ Crohn's, AFib/Flutter s/p DCCVs on amiodarone, remote ileocectomy and open appendectomy. Admitted (6/23) for SBO vs Crohns flare, s/p NGT decompression and s/p lap TRE converted to open TRE, SBR x 3, left in discontinuity with abthera vac on (6/27), RTOR for ileocolic resection, small bowel anastomosis, and abdominal wall closure on (6/28), c/b fluid collection s/p IR aspiration of perihepatic fluid on (7/3), c/b wound dehiscence s/p RTOR exlap, washout, ileocolic resection with end ileostomy, blow hole colostomy, red rubber from ileostomy to small bowel anastomosis; vicryl bridging mesh on (7/5) transferred to SICU postoperatively for hemodynamic monitoring, with hospital course complicated by periods of AMS most recently on 9/1 and associated with oliguria, severe ELVI and hyponatremia, which resolved but now stepped back up for to SICU on 9/10 for acute AMS, intermittent hypoglycemia, AFib with RVR. Perc Choley tube placed on 9/11 for enlarged GB, capped 10/4.      Encourage OOB/ambulation  Increase imodium gradually to max dose  Rest of care per SICU team   78 M w/ Crohn's, AFib/Flutter s/p DCCVs on amiodarone, remote ileocectomy and open appendectomy. Admitted (6/23) for SBO vs Crohns flare, s/p NGT decompression and s/p lap TRE converted to open TRE, SBR x 3, left in discontinuity with abthera vac on (6/27), RTOR for ileocolic resection, small bowel anastomosis, and abdominal wall closure on (6/28), c/b fluid collection s/p IR aspiration of perihepatic fluid on (7/3), c/b wound dehiscence s/p RTOR exlap, washout, ileocolic resection with end ileostomy, blow hole colostomy, red rubber from ileostomy to small bowel anastomosis; vicryl bridging mesh on (7/5) transferred to SICU postoperatively for hemodynamic monitoring, with hospital course complicated by periods of AMS most recently on 9/1 and associated with oliguria, severe ELVI and hyponatremia, which resolved but now stepped back up for to SICU on 9/10 for acute AMS, intermittent hypoglycemia, AFib with RVR. Perc Choley tube placed on 9/11 for enlarged GB, capped 10/4.    Keep perc cony capped  Encourage OOB/ambulation  Increase imodium to max dose  Wound care tomorrow   Rest of care per SICU team

## 2023-10-05 NOTE — PROGRESS NOTE ADULT - ATTENDING COMMENTS
78 year old male with hx crohn's, atrial fibrillation s/p SBR for SBO with ileocolic resection and ileostomy, on TPN. Planned for perc cony tube study today 78 year old male with hx crohn's, atrial fibrillation s/p SBR for SBO with ileocolic resection and ileostomy, on TPN.  perc cony tube capped.  AXR today. Increasing non protein calories in TPN.

## 2023-10-05 NOTE — CHART NOTE - NSCHARTNOTEFT_GEN_A_CORE
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 reports 4 DCCV  Now on Amiodarone 200mg PO bid and Eliquis 5mg PO bid  Last DCCV 4 yrs ago at [facility]      Crohn's disease  s/p partial resection of ileum      Hyperlipidemia      Hypothyroidism      History of depression  On Venlafaxine ER 150mg PO bid      Junctional rhythm      H/O knee surgery      History of cataract surgery          Current Nutrition Order: NPO; TPN via PICC- 365g Dex, 125g AA, 100g SMOF lipids; provides 2741 kcals, 125g protein, GIR 3.06 mg/kg/min    PO Intake: Good (%) [   ]  Fair (50-75%) [   ] Poor (<25%) [   ]- N/A    GI Issues: abd wound & fistula, RLQ ostomy, perc cony capped, likely w    Pain: no pain/discomfort noted    Skin Integrity: JOYCE drain x 2, mid abd wound, EC fistula, ileostomy, cony bag, L forearm skin tear. Rudy score 16    Labs:   10-05    138  |  105  |  29<H>  ----------------------------<  121<H>  3.8   |  25  |  1.18    Ca    8.8      05 Oct 2023 05:31  Phos  2.7     10-05  Mg     2.0     10-05    TPro  7.8  /  Alb  2.2<L>  /  TBili  3.4<H>  /  DBili  x   /  AST  73<H>  /  ALT  77<H>  /  AlkPhos  154<H>  10-05    CAPILLARY BLOOD GLUCOSE          Medications:  MEDICATIONS  (STANDING):  amLODIPine   Tablet 10 milliGRAM(s) Oral daily  chlorhexidine 2% Cloths 1 Application(s) Topical <User Schedule>  diphenoxylate/atropine 2 Tablet(s) Oral every 6 hours  glucagon  Injectable 1 milliGRAM(s) IntraMuscular once  heparin   Injectable 5000 Unit(s) SubCutaneous every 8 hours  influenza  Vaccine (HIGH DOSE) 0.7 milliLiter(s) IntraMuscular once  levothyroxine Injectable 40 MICROGram(s) IV Push at bedtime  loperamide 4 milliGRAM(s) Oral every 6 hours  metoprolol tartrate Injectable 5 milliGRAM(s) IV Push every 6 hours  nystatin Powder 1 Application(s) Topical three times a day  Parenteral Nutrition - Adult 1 Each (104 mL/Hr) TPN Continuous <Continuous>    MEDICATIONS  (PRN):  ondansetron Injectable 4 milliGRAM(s) IV Push every 8 hours PRN Nausea and/or Vomiting      Weight: 82.6kg [4 Oct 2023]   Daily 83.5kg [03 Oct 2023]  Daily 84.5kg [2 Oct 2023]  Daily 87.2kg [1 Oct 2023]  Daily 88.3kg [29 Sep 2023]  Daily 89.9kg [28 Sep 2023]  Weight: 87.7kg [24 Sep 2023]  Daily 90.4kg [21 Sep 2023]  Daily 91.4kg [20 Sep 2023]  Daily 86.7kg [18 Sep 2023]  Daily 88.1kg [16 Sep 2023]  Daily 88.9kg [15 Sep 2023]   Daily 89.1 [14 Sep 2023]  Daily 92.9kg [6 Sep 2023]  Daily 91.8kg [27 Aug 2023]  Daily 101kg [9 Aug 2023]  Daily   102.1kg [10 July 2023]  Daily 99.9kg [9 July 2023]    Weight Change:  weight trending down throughout admission. Recommend continue weekly/daily weights for trending  & ensuring adequacy of nutrition provision    IBW: 86.4kg   % IBW: 95.6%    Estimated energy needs:   Current body wt [82.6kg] used for energy calculations as pt <100% IBW. Needs estimated for age and adjusted for current clinical status, increased needs for post-op & open abd wound healing    Calories: 2065-7642 kcals based on 30-35 kcals/kg  Protein: 123.9-165 g protein based on 1.5-2.0g protein/kg  *Fluid needs per team    Subjective:   78M w/ Crohn's, AFib/Flutter s/p DCCVs on amiodarone, remote ileocectomy and open appy here for SBO vs Crohns flare, s/p NGT decompression and now s/p lap TRE converted to open TRE, SBR x 3, left in discontinuity with abthera vac on 6/27, RTOR for ileocolic resection, small bowel anastomosis, and abdominal wall closure on 6/28, and s/p IR aspiration of perihepatic fluid on 7/3, stepped down to telemetry on 7/4. wound dehiscence on 7/5, RTOR ex-lap, washout, ileocolic resection with end ileostomy, blow hole colostomy, red rubber from ileostomy to small bowel anastomosis; Vicryl bridging mesh on 7/5. Transferred to SICU post op for HD monitoring. Extubated 7/6 and SDU 8/2. Has been recovering on telemetry with ECF management and prolonged TPN. Upgraded to SICU 8/23 for acute delirium and tremors, r/o sepsis vs metabolic encephalopathy. Ammonia levels normal. TPN DC'ed and started on PO diet. Stepped down to 8 Lachman on 8/28.    Chart reviewed. Pt seen on 5 EA with IDT today during AM rounds. TPN remains primary means to nutrition. On imodium and lomotil, octreotide.    Previous Nutrition Diagnosis:  Increased Nutrient Needs R/T physiological demands for nutrient AEB post-op wound healing    Active [ X  ]  Resolved [   ]    If resolved, new PES:     Goal:  Pt will meet at least 75% of protein & energy needs via most appropriate route for nutrition     Recommendations:  1. 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 reports 4 DCCV  Now on Amiodarone 200mg PO bid and Eliquis 5mg PO bid  [facility]      Crohn's disease  s/p partial resection of ileum      Hyperlipidemia      Hypothyroidism      History of depression  On Venlafaxine ER 150mg PO bid      Junctional rhythm      H/O knee surgery      History of cataract surgery          Current Nutrition Order: NPO; TPN via PICC- 365g Dex, 125g AA, 100g SMOF lipids; provides 2741 kcals, 125g protein, GIR 3.06 mg/kg/min    PO Intake: Good (%) [   ]  Fair (50-75%) [   ] Poor (<25%) [   ]- N/A    GI Issues: abd wound & fistula with wound manager, RLQ ostomy, perc cony capped, likely w delayed gastric emptying    Pain: no pain/discomfort noted    Skin Integrity: JOYCE drain x 2, mid abd wound, EC fistula, ileostomy, cony bag, L forearm skin tear. Rudy score 16    Labs:   10-05    138  |  105  |  29<H>  ----------------------------<  121<H>  3.8   |  25  |  1.18    Ca    8.8      05 Oct 2023 05:31  Phos  2.7     10-05  Mg     2.0     10-05    TPro  7.8  /  Alb  2.2<L>  /  TBili  3.4<H>  /  DBili  x   /  AST  73<H>  /  ALT  77<H>  /  AlkPhos  154<H>  10-05    CAPILLARY BLOOD GLUCOSE          Medications:  MEDICATIONS  (STANDING):  amLODIPine   Tablet 10 milliGRAM(s) Oral daily  chlorhexidine 2% Cloths 1 Application(s) Topical <User Schedule>  diphenoxylate/atropine 2 Tablet(s) Oral every 6 hours  glucagon  Injectable 1 milliGRAM(s) IntraMuscular once  heparin   Injectable 5000 Unit(s) SubCutaneous every 8 hours  influenza  Vaccine (HIGH DOSE) 0.7 milliLiter(s) IntraMuscular once  levothyroxine Injectable 40 MICROGram(s) IV Push at bedtime  loperamide 4 milliGRAM(s) Oral every 6 hours  metoprolol tartrate Injectable 5 milliGRAM(s) IV Push every 6 hours  nystatin Powder 1 Application(s) Topical three times a day  Parenteral Nutrition - Adult 1 Each (104 mL/Hr) TPN Continuous <Continuous>    MEDICATIONS  (PRN):  ondansetron Injectable 4 milliGRAM(s) IV Push every 8 hours PRN Nausea and/or Vomiting      Weight: 82.6kg [4 Oct 2023]   Daily 83.5kg [03 Oct 2023]  Daily 84.5kg [2 Oct 2023]  Daily 87.2kg [1 Oct 2023]  Daily 88.3kg [29 Sep 2023]  Daily 89.9kg [28 Sep 2023]  Weight: 87.7kg [24 Sep 2023]  Daily 90.4kg [21 Sep 2023]  Daily 91.4kg [20 Sep 2023]  Daily 86.7kg [18 Sep 2023]  Daily 88.1kg [16 Sep 2023]  Daily 88.9kg [15 Sep 2023]   Daily 89.1 [14 Sep 2023]  Daily 92.9kg [6 Sep 2023]  Daily 91.8kg [27 Aug 2023]  Daily 101kg [9 Aug 2023]  Daily   102.1kg [10 July 2023]  Daily 99.9kg [9 July 2023]    Weight Change:  weight trending down throughout admission. Recommend continue weekly/daily weights for trending  & ensuring adequacy of nutrition provision    IBW: 86.4kg   % IBW: 95.6%    Estimated energy needs:   Current body wt [82.6kg] used for energy calculations as pt <100% IBW. Needs estimated for age and adjusted for current clinical status, increased needs for post-op & open abd wound healing    Calories: 3857-9786 kcals based on 30-40 kcals/kg  Protein: 123.9-165 g protein based on 1.5-2.0g protein/kg + additional depending on outputs  *Fluid needs per team    Subjective:   78M w/ Crohn's, AFib/Flutter s/p DCCVs on amiodarone, remote ileocectomy and open appy here for SBO vs Crohns flare, s/p NGT decompression and now s/p lap TRE converted to open TRE, SBR x 3, left in discontinuity with abthera vac on 6/27, RTOR for ileocolic resection, small bowel anastomosis, and abdominal wall closure on 6/28, and s/p IR aspiration of perihepatic fluid on 7/3, stepped down to telemetry on 7/4. wound dehiscence on 7/5, RTOR ex-lap, washout, ileocolic resection with end ileostomy, blow hole colostomy, red rubber from ileostomy to small bowel anastomosis; Vicryl bridging mesh on 7/5. Transferred to SICU post op for HD monitoring. Extubated 7/6 and SDU 8/2. Has been recovering on telemetry with ECF management and prolonged TPN. Upgraded to SICU 8/23 for acute delirium and tremors, r/o sepsis vs metabolic encephalopathy. Ammonia levels normal. TPN DC'ed and started on PO diet. Stepped down to 8 Lachman on 8/28.    Chart reviewed. Meds & labs reviewed. Pt seen on 5 EA with IDT today during AM rounds. TPN remains primary means to nutrition. On imodium and lomotil, octreotide. Pt tolerating ice chips and sips, denies abd pain/discomfort, no nausea. Output x 24hrs: 2100ml uop, 90ml LUQ JOYCE drain, 190ml RUQ cony drain, 80ml RLQ abd drain, 1365ml mid abd wound drain. Micronutrient labs ordered- selenium low, awaiting others. Can increase selenium in TPN bag. Ongoing elevated BUN & Creat- increased volume in TPN to 2.5L. Also noted with downtrending weight, muscle wasting present to head per visual assessment. Recommend increasing kcal & protein provision in TPN bag. Discussed with team. RDN will continue to monitor, reassess, and intervene as appropriate.     Previous Nutrition Diagnosis:  Increased Nutrient Needs R/T physiological demands for nutrient AEB post-op wound healing    Active [ X  ]  Resolved [   ]    If resolved, new PES:     Goal:  Pt will meet at least 75% of protein & energy needs via most appropriate route for nutrition     Recommendations:  1. c/w TPN via PICC  >> recommend 411g Dex, 144g AA, 100g Lipids; provides 2973.4 kcals, 144g protein, GIR 3.56 mg/kg/min  - provides 35.9 kcals/kg, 29.0 non-protein kcals/kg, 1.74g protein/kg current wt  - w/ MVI, selenium & zinc  - monitor micronutrient levels, increase in TPN bag prn   - consider cycling additional micronutrients in TPN bag [2-3x/wk] to assist in wound healing  2. Fluids & lytes per team  3. Monitor renal function  4. Weekly lipid panel  -  <H> 10/5, repeat 10/12  - hold lipids if TG >400  5. Monitor BMP, Mg, Phos. POC BG   - monitor & replenish lytes outside TPN bag prn  6. Monitor chemistry, GI function, skin integrity  7. To continue close monitoring of clinical course & adjust recommendations prn  8. Monitor feasibility for advancing PO diet    Education: deferred    Risk Level: High [  X ] Moderate [   ] Low [   ]

## 2023-10-05 NOTE — PROGRESS NOTE ADULT - SUBJECTIVE AND OBJECTIVE BOX
SUBJECTIVE: Patient seen and examined bedside. Pt reports feeling well today with no acute complaints. Denies abdominal pain, nausea, or vomiting.     amLODIPine   Tablet 10 milliGRAM(s) Oral daily  heparin   Injectable 5000 Unit(s) SubCutaneous every 8 hours  metoprolol tartrate Injectable 5 milliGRAM(s) IV Push every 6 hours      Vital Signs Last 24 Hrs  T(C): 36.6 (05 Oct 2023 08:53), Max: 37.1 (04 Oct 2023 17:52)  T(F): 97.8 (05 Oct 2023 08:53), Max: 98.7 (04 Oct 2023 17:52)  HR: 97 (05 Oct 2023 12:00) (86 - 97)  BP: 127/64 (05 Oct 2023 12:00) (106/63 - 147/66)  BP(mean): 89 (05 Oct 2023 12:00) (79 - 103)  RR: 18 (05 Oct 2023 12:00) (16 - 44)  SpO2: 99% (05 Oct 2023 12:00) (98% - 100%)    Parameters below as of 05 Oct 2023 12:00  Patient On (Oxygen Delivery Method): room air      I&O's Detail    04 Oct 2023 07:01  -  05 Oct 2023 07:00  --------------------------------------------------------  IN:    Fat Emulsion (Fish Oil &amp; Plant Based) 20% Infusion: 624 mL    TPN (Total Parenteral Nutrition): 2392 mL  Total IN: 3016 mL    OUT:    Bulb (mL): 90 mL    Drain (mL): 190 mL    Drain (mL): 1365 mL    Drain (mL): 80 mL    Voided (mL): 2100 mL  Total OUT: 3825 mL    Total NET: -809 mL      05 Oct 2023 07:01  -  05 Oct 2023 12:59  --------------------------------------------------------  IN:    TPN (Total Parenteral Nutrition): 624 mL  Total IN: 624 mL    OUT:    Bulb (mL): 50 mL    Drain (mL): 372 mL    Voided (mL): 625 mL  Total OUT: 1047 mL    Total NET: -423 mL          Physical Exam:  General: NAD, resting comfortably in bed  C/V: NSR  Pulm: Nonlabored breathing, no respiratory distress  Abd: Soft, ND, NT, no rebound tenderness, no guarding. Wound manager over midline wound with fistula/ostomy. RLQ ileostomy, perc cony capped, JOYCE in LUQ       LABS:                        9.2    6.61  )-----------( 177      ( 05 Oct 2023 05:31 )             29.7     10-05    138  |  105  |  29<H>  ----------------------------<  121<H>  3.8   |  25  |  1.18    Ca    8.8      05 Oct 2023 05:31  Phos  2.7     10-05  Mg     2.0     10-05    TPro  7.8  /  Alb  2.2<L>  /  TBili  3.4<H>  /  DBili  x   /  AST  73<H>  /  ALT  77<H>  /  AlkPhos  154<H>  10-05      Urinalysis Basic - ( 05 Oct 2023 05:31 )    Color: x / Appearance: x / SG: x / pH: x  Gluc: 121 mg/dL / Ketone: x  / Bili: x / Urobili: x   Blood: x / Protein: x / Nitrite: x   Leuk Esterase: x / RBC: x / WBC x   Sq Epi: x / Non Sq Epi: x / Bacteria: x        RADIOLOGY & ADDITIONAL STUDIES:

## 2023-10-05 NOTE — PROGRESS NOTE ADULT - SUBJECTIVE AND OBJECTIVE BOX
tube study normal  continue wound care  AVSS  NL WBC  lytes OK    Increase lomotil  to max dose  OOB

## 2023-10-05 NOTE — PROGRESS NOTE ADULT - SUBJECTIVE AND OBJECTIVE BOX
SUBJECTIVE:  Patient was evaluated at bedside by the general surgery team. Patient was in good spirits and has no complaints. Yesterday/overnight events: Perc cony tube check was done that showed a patent cystic duct and CBD, no choledocholithiasis however possible CBD obstructions vs stenosis. AXR was later obtained to evaluate for contrast in the duodenum (pending final read however appears to show contrast in the duodenum). Tube was capped.    MEDICATIONS  (STANDING):  amLODIPine   Tablet 10 milliGRAM(s) Oral daily  chlorhexidine 2% Cloths 1 Application(s) Topical <User Schedule>  diphenoxylate/atropine 2 Tablet(s) Oral every 6 hours  glucagon  Injectable 1 milliGRAM(s) IntraMuscular once  heparin   Injectable 5000 Unit(s) SubCutaneous every 8 hours  influenza  Vaccine (HIGH DOSE) 0.7 milliLiter(s) IntraMuscular once  levothyroxine Injectable 40 MICROGram(s) IV Push at bedtime  lipid, fat emulsion (Fish Oil and Plant Based) 20% Infusion 1.15 Gm/kG/Day (41.67 mL/Hr) IV Continuous <Continuous>  loperamide 4 milliGRAM(s) Oral every 6 hours  metoprolol tartrate Injectable 5 milliGRAM(s) IV Push every 6 hours  nystatin Powder 1 Application(s) Topical three times a day  Parenteral Nutrition - Adult 1 Each (104 mL/Hr) TPN Continuous <Continuous>    MEDICATIONS  (PRN):  ondansetron Injectable 4 milliGRAM(s) IV Push every 8 hours PRN Nausea and/or Vomiting      Vital Signs Last 24 Hrs  T(C): 36.6 (05 Oct 2023 05:03), Max: 37.1 (04 Oct 2023 17:52)  T(F): 97.8 (05 Oct 2023 05:03), Max: 98.7 (04 Oct 2023 17:52)  HR: 89 (05 Oct 2023 04:00) (86 - 95)  BP: 147/66 (05 Oct 2023 04:00) (106/63 - 147/66)  BP(mean): 95 (05 Oct 2023 04:00) (79 - 95)  RR: 17 (05 Oct 2023 04:00) (13 - 44)  SpO2: 98% (05 Oct 2023 04:00) (98% - 100%)    Parameters below as of 05 Oct 2023 04:00  Patient On (Oxygen Delivery Method): room air        Physical Exam:  General: NAD, resting comfortably in bed  C/V: NSR  Pulm: Nonlabored breathing, no respiratory distress  Abd: Soft, ND, NT, no rebound tenderness, no guarding. Wound manager over midline wound with fistula/ostomy. RLQ ileostomy, perc cony capped, JOYCE in LUQ     I&O's Summary    04 Oct 2023 07:01  -  05 Oct 2023 07:00  --------------------------------------------------------  IN: 2579.2 mL / OUT: 3595 mL / NET: -1015.8 mL        LABS:                        9.2    6.61  )-----------( 177      ( 05 Oct 2023 05:31 )             29.7     10-05    138  |  105  |  29<H>  ----------------------------<  121<H>  3.8   |  25  |  1.18    Ca    8.8      05 Oct 2023 05:31  Phos  2.7     10-05  Mg     2.0     10-05    TPro  7.8  /  Alb  2.2<L>  /  TBili  3.4<H>  /  DBili  x   /  AST  73<H>  /  ALT  77<H>  /  AlkPhos  154<H>  10-05      Urinalysis Basic - ( 05 Oct 2023 05:31 )    Color: x / Appearance: x / SG: x / pH: x  Gluc: 121 mg/dL / Ketone: x  / Bili: x / Urobili: x   Blood: x / Protein: x / Nitrite: x   Leuk Esterase: x / RBC: x / WBC x   Sq Epi: x / Non Sq Epi: x / Bacteria: x      CAPILLARY BLOOD GLUCOSE        LIVER FUNCTIONS - ( 05 Oct 2023 05:31 )  Alb: 2.2 g/dL / Pro: 7.8 g/dL / ALK PHOS: 154 U/L / ALT: 77 U/L / AST: 73 U/L / GGT: x             RADIOLOGY & ADDITIONAL STUDIES:

## 2023-10-05 NOTE — PROGRESS NOTE ADULT - ASSESSMENT
78 M w/ Crohn's, AFib/Flutter s/p DCCVs on amiodarone, remote ileocectomy and open appendectomy. Admitted (6/23) for SBO vs Crohns flare, s/p NGT decompression and s/p lap TRE converted to open TRE, SBR x 3, left in discontinuity with abthera vac on (6/27), RTOR for ileocolic resection, small bowel anastomosis, and abdominal wall closure on (6/28), c/b fluid collection s/p IR aspiration of perihepatic fluid on (7/3), c/b wound dehiscence s/p RTOR exlap, washout, ileocolic resection with end ileostomy, blow hole colostomy, red rubber from ileostomy to small bowel anastomosis; vicryl bridging mesh on (7/5) transferred to SICU postoperatively for hemodynamic monitoring, with hospital course complicated by periods of AMS most recently on 9/1 and associated with oliguria, severe ELVI and hyponatremia, which resolved but now stepped back up for to SICU on 9/10 for acute AMS, intermittent hypoglycemia, AFib with RVR. Perc Choley tube placed on 9/11 for enlarged GB, now capped on 10/4.    Recommendations:    Repeat AXR to assess for passage of contrast  F/u drain outputs  F/u nutrition labs  Plan for wound  tomorrow (10/6)  SICU level of care    Plan discussed with chief resident and Dr. Knapp

## 2023-10-05 NOTE — PROGRESS NOTE ADULT - SUBJECTIVE AND OBJECTIVE BOX
INTERVAL/OVERNIGHT EVENTS: ON no pain, no issues. Pressures held and making good urine. Still tolerating ice chips and sips. In good spirits about the findings of his tube study.     POD #104  SICU Day #104    Neurologic Medications  ondansetron Injectable 4 milliGRAM(s) IV Push every 8 hours PRN Nausea and/or Vomiting    Respiratory Medications    Cardiovascular Medications  amLODIPine   Tablet 10 milliGRAM(s) Oral daily  metoprolol tartrate Injectable 5 milliGRAM(s) IV Push every 6 hours    Gastrointestinal Medications  diphenoxylate/atropine 2 Tablet(s) Oral every 6 hours  lipid, fat emulsion (Fish Oil and Plant Based) 20% Infusion 1.15 Gm/kG/Day IV Continuous <Continuous>  loperamide 4 milliGRAM(s) Oral every 6 hours  Parenteral Nutrition - Adult 1 Each TPN Continuous <Continuous>  Parenteral Nutrition - Adult 1 Each TPN Continuous <Continuous>    Genitourinary Medications    Hematologic/Oncologic Medications  heparin   Injectable 5000 Unit(s) SubCutaneous every 8 hours  influenza  Vaccine (HIGH DOSE) 0.7 milliLiter(s) IntraMuscular once    Antimicrobial/Immunologic Medications    Endocrine/Metabolic Medications  glucagon  Injectable 1 milliGRAM(s) IntraMuscular once  levothyroxine Injectable 40 MICROGram(s) IV Push at bedtime    Topical/Other Medications  chlorhexidine 2% Cloths 1 Application(s) Topical <User Schedule>  nystatin Powder 1 Application(s) Topical three times a day      MEDICATIONS  (PRN):  ondansetron Injectable 4 milliGRAM(s) IV Push every 8 hours PRN Nausea and/or Vomiting      I&O's Detail    04 Oct 2023 07:01  -  05 Oct 2023 07:00  --------------------------------------------------------  IN:    Fat Emulsion (Fish Oil &amp; Plant Based) 20% Infusion: 624 mL    TPN (Total Parenteral Nutrition): 2392 mL  Total IN: 3016 mL    OUT:    Bulb (mL): 90 mL    Drain (mL): 190 mL    Drain (mL): 1365 mL    Drain (mL): 80 mL    Voided (mL): 2100 mL  Total OUT: 3825 mL    Total NET: -809 mL      05 Oct 2023 07:01  -  05 Oct 2023 13:26  --------------------------------------------------------  IN:    TPN (Total Parenteral Nutrition): 624 mL  Total IN: 624 mL    OUT:    Bulb (mL): 50 mL    Drain (mL): 372 mL    Voided (mL): 625 mL  Total OUT: 1047 mL    Total NET: -423 mL          Vital Signs Last 24 Hrs  T(C): 36.6 (05 Oct 2023 08:53), Max: 37.1 (04 Oct 2023 17:52)  T(F): 97.8 (05 Oct 2023 08:53), Max: 98.7 (04 Oct 2023 17:52)  HR: 97 (05 Oct 2023 12:00) (86 - 97)  BP: 127/64 (05 Oct 2023 12:00) (106/63 - 147/66)  BP(mean): 89 (05 Oct 2023 12:00) (79 - 103)  RR: 18 (05 Oct 2023 12:00) (16 - 44)  SpO2: 99% (05 Oct 2023 12:00) (98% - 100%)    Parameters below as of 05 Oct 2023 12:00  Patient On (Oxygen Delivery Method): room air        GENERAL: NAD, resting comfortably in bed  HEENT: NCAT, MMM  C/V: Normal rate, normal peripheral perfusion  PULM: Nonlabored breathing, no respiratory distress,   ABD: Soft, ND, NT, no rebound tenderness, no guarding  EXTREM: WWP, no edema, SCDs in place  NEURO: No focal deficits    LABS:                        9.2    6.61  )-----------( 177      ( 05 Oct 2023 05:31 )             29.7     10-05    138  |  105  |  29<H>  ----------------------------<  121<H>  3.8   |  25  |  1.18    Ca    8.8      05 Oct 2023 05:31  Phos  2.7     10-05  Mg     2.0     10-05    TPro  7.8  /  Alb  2.2<L>  /  TBili  3.4<H>  /  DBili  x   /  AST  73<H>  /  ALT  77<H>  /  AlkPhos  154<H>  10-05      Urinalysis Basic - ( 05 Oct 2023 05:31 )    Color: x / Appearance: x / SG: x / pH: x  Gluc: 121 mg/dL / Ketone: x  / Bili: x / Urobili: x   Blood: x / Protein: x / Nitrite: x   Leuk Esterase: x / RBC: x / WBC x   Sq Epi: x / Non Sq Epi: x / Bacteria: x        RADIOLOGY & ADDITIONAL STUDIES:

## 2023-10-06 NOTE — ADVANCED PRACTICE NURSE CONSULT - ASSESSMENT
No issues with wound manager. Pt reports feeling well, remains NPO, on TPN. OOB to chair and walking in the hallways. Denies pain.    Abdomen:  100% red granulation tissue with new epithelium to edges. Minimal biofilm today. Stomatized fistula at 2 o'clock with bilious effluent. Periwound skin is intact without irritation. RLQ ileostomy stoma retracted, small less than 1 inch ovoid, peristomal skin intact. Stoma intubated with red rubber catheter.    Skin thoroughly cleansed.  Vashe soak applied to wound bed for approx 5 minutes.  Cavilon No Sting Barrier film applied to periwound, section of VAC isolator along edge of fistula, Adapt ostomy rings, stoma paste applied around wound and within creases.  Entire area pouched with an Dustin wound manager # [id number].   2 piece 1 3/4" pouch with Adapt ring applied to RLQ Ileostomy.

## 2023-10-06 NOTE — PROGRESS NOTE ADULT - ASSESSMENT
78 M w/ Crohn's, AFib/Flutter s/p DCCVs on amiodarone, remote ileocectomy and open appendectomy. Admitted (6/23) for SBO vs Crohns flare, s/p NGT decompression and s/p lap TRE converted to open TRE, SBR x 3, left in discontinuity with abthera vac on (6/27), RTOR for ileocolic resection, small bowel anastomosis, and abdominal wall closure on (6/28), c/b fluid collection s/p IR aspiration of perihepatic fluid on (7/3), c/b wound dehiscence s/p RTOR exlap, washout, ileocolic resection with end ileostomy, blow hole colostomy, red rubber from ileostomy to small bowel anastomosis; vicryl bridging mesh on (7/5) transferred to SICU postoperatively for hemodynamic monitoring, with hospital course complicated by periods of AMS most recently on 9/1 and associated with oliguria, severe ELVI and hyponatremia, which resolved but now stepped back up for to SICU on 9/10 for acute AMS, intermittent hypoglycemia, AFib with RVR. Perc Choley tube placed on 9/11 for enlarged GB, capped 10/4.    Continue TPN and NPO  Encourage OOB/ambulation  Continue octreotide, lomotil, immodium  Continue wound care evaluation   Rest of care per SICU team

## 2023-10-06 NOTE — PROGRESS NOTE ADULT - SUBJECTIVE AND OBJECTIVE BOX
SUBJECTIVE:  Patient was evaluated this AM by the general surgery team. Patient is doing well this AM and denies pain, nausea, or vomiting.     MEDICATIONS  (STANDING):  amLODIPine   Tablet 10 milliGRAM(s) Oral daily  chlorhexidine 2% Cloths 1 Application(s) Topical <User Schedule>  diphenoxylate/atropine 2 Tablet(s) Oral every 6 hours  glucagon  Injectable 1 milliGRAM(s) IntraMuscular once  heparin   Injectable 5000 Unit(s) SubCutaneous every 8 hours  influenza  Vaccine (HIGH DOSE) 0.7 milliLiter(s) IntraMuscular once  levothyroxine Injectable 40 MICROGram(s) IV Push at bedtime  lipid, fat emulsion (Fish Oil and Plant Based) 20% Infusion 1.15 Gm/kG/Day (41.67 mL/Hr) IV Continuous <Continuous>  loperamide 4 milliGRAM(s) Oral every 6 hours  metoprolol tartrate Injectable 5 milliGRAM(s) IV Push every 6 hours  nystatin Powder 1 Application(s) Topical three times a day  Parenteral Nutrition - Adult 1 Each (104 mL/Hr) TPN Continuous <Continuous>  potassium chloride  10 mEq/100 mL IVPB 10 milliEquivalent(s) IV Intermittent once    MEDICATIONS  (PRN):  ondansetron Injectable 4 milliGRAM(s) IV Push every 8 hours PRN Nausea and/or Vomiting      Vital Signs Last 24 Hrs  T(C): 36.6 (06 Oct 2023 05:53), Max: 37.6 (05 Oct 2023 17:40)  T(F): 97.9 (06 Oct 2023 05:53), Max: 99.6 (05 Oct 2023 17:40)  HR: 90 (06 Oct 2023 08:00) (87 - 97)  BP: 115/58 (06 Oct 2023 08:00) (107/54 - 137/60)  BP(mean): 84 (06 Oct 2023 08:00) (76 - 90)  RR: 18 (06 Oct 2023 08:00) (13 - 32)  SpO2: 100% (06 Oct 2023 08:00) (99% - 100%)    Parameters below as of 06 Oct 2023 08:00  Patient On (Oxygen Delivery Method): room air        Physical Exam:  General: NAD, resting comfortably in bed  C/V: NSR  Pulm: Nonlabored breathing, no respiratory distress  Abd: Soft, ND, NT, no rebound tenderness, no guarding. Wound manager over midline wound with fistula/ostomy with bilious output. RLQ ileostomy, perc cony capped, JOYCE in LUQ     I&O's Summary    05 Oct 2023 07:01  -  06 Oct 2023 07:00  --------------------------------------------------------  IN: 2871.8 mL / OUT: 3487 mL / NET: -615.2 mL    06 Oct 2023 07:01  -  06 Oct 2023 08:12  --------------------------------------------------------  IN: 104 mL / OUT: 0 mL / NET: 104 mL        LABS:                        9.4    6.44  )-----------( 116      ( 06 Oct 2023 05:41 )             30.7     10-06    138  |  106  |  31<H>  ----------------------------<  129<H>  3.9   |  24  |  1.14    Ca    8.6      06 Oct 2023 05:41  Phos  2.6     10-06  Mg     2.0     10-06    TPro  7.8  /  Alb  2.2<L>  /  TBili  3.4<H>  /  DBili  x   /  AST  73<H>  /  ALT  77<H>  /  AlkPhos  154<H>  10-05      Urinalysis Basic - ( 06 Oct 2023 05:41 )    Color: x / Appearance: x / SG: x / pH: x  Gluc: 129 mg/dL / Ketone: x  / Bili: x / Urobili: x   Blood: x / Protein: x / Nitrite: x   Leuk Esterase: x / RBC: x / WBC x   Sq Epi: x / Non Sq Epi: x / Bacteria: x      CAPILLARY BLOOD GLUCOSE        LIVER FUNCTIONS - ( 05 Oct 2023 05:31 )  Alb: 2.2 g/dL / Pro: 7.8 g/dL / ALK PHOS: 154 U/L / ALT: 77 U/L / AST: 73 U/L / GGT: x             RADIOLOGY & ADDITIONAL STUDIES:

## 2023-10-06 NOTE — PROGRESS NOTE ADULT - ATTENDING COMMENTS
Covering for Dr. Peterson.    No complaints.  Eager to try food next week.  AFVSS  Abd soft, ND, wound vac in place.  Output <1L/24h with cholecystostomy tube capped.  u/o 2L    Labs ok    A/P: Entero-atmospheric fistula controlled.    1. Continue TPN  2. NPO except sips for now.  3. Monitor fistula output  4. OOB ambulating  5. Continue ICU care given multiple readmissions.

## 2023-10-06 NOTE — PROGRESS NOTE ADULT - SUBJECTIVE AND OBJECTIVE BOX
INTERVAL/OVERNIGHT EVENTS + SUBJECTIVE:     POD # 105  SICU Day # 105    Neurologic Medications  ondansetron Injectable 4 milliGRAM(s) IV Push every 8 hours PRN Nausea and/or Vomiting    Respiratory Medications    Cardiovascular Medications  amLODIPine   Tablet 10 milliGRAM(s) Oral daily  metoprolol tartrate Injectable 5 milliGRAM(s) IV Push every 6 hours    Gastrointestinal Medications  diphenoxylate/atropine 2 Tablet(s) Oral every 6 hours  lipid, fat emulsion (Fish Oil and Plant Based) 20% Infusion 1.15 Gm/kG/Day IV Continuous <Continuous>  loperamide 4 milliGRAM(s) Oral every 6 hours  Parenteral Nutrition - Adult 1 Each TPN Continuous <Continuous>  potassium phosphate IVPB 15 milliMole(s) IV Intermittent once    Genitourinary Medications    Hematologic/Oncologic Medications  heparin   Injectable 5000 Unit(s) SubCutaneous every 8 hours  influenza  Vaccine (HIGH DOSE) 0.7 milliLiter(s) IntraMuscular once    Antimicrobial/Immunologic Medications    Endocrine/Metabolic Medications  glucagon  Injectable 1 milliGRAM(s) IntraMuscular once  levothyroxine Injectable 40 MICROGram(s) IV Push at bedtime    Topical/Other Medications  chlorhexidine 2% Cloths 1 Application(s) Topical <User Schedule>  nystatin Powder 1 Application(s) Topical three times a day      MEDICATIONS  (PRN):  ondansetron Injectable 4 milliGRAM(s) IV Push every 8 hours PRN Nausea and/or Vomiting      I&O's Detail    05 Oct 2023 07:01  -  06 Oct 2023 07:00  --------------------------------------------------------  IN:    Fat Emulsion (Fish Oil &amp; Plant Based) 20% Infusion: 583.8 mL    TPN (Total Parenteral Nutrition): 2288 mL  Total IN: 2871.8 mL    OUT:    Bulb (mL): 90 mL    Drain (mL): 972 mL    Drain (mL): 100 mL    Voided (mL): 2325 mL  Total OUT: 3487 mL    Total NET: -615.2 mL      06 Oct 2023 07:01  -  06 Oct 2023 09:10  --------------------------------------------------------  IN:    TPN (Total Parenteral Nutrition): 104 mL  Total IN: 104 mL    OUT:  Total OUT: 0 mL    Total NET: 104 mL          Vital Signs Last 24 Hrs  T(C): 36.6 (06 Oct 2023 05:53), Max: 37.6 (05 Oct 2023 17:40)  T(F): 97.9 (06 Oct 2023 05:53), Max: 99.6 (05 Oct 2023 17:40)  HR: 91 (06 Oct 2023 09:00) (87 - 97)  BP: 115/58 (06 Oct 2023 08:00) (107/54 - 137/60)  BP(mean): 84 (06 Oct 2023 08:00) (76 - 90)  RR: 31 (06 Oct 2023 09:00) (13 - 32)  SpO2: 93% (06 Oct 2023 09:00) (93% - 100%)    Parameters below as of 06 Oct 2023 08:00  Patient On (Oxygen Delivery Method): room air        GENERAL: NAD, resting comfortably in bed  HEENT: NCAT, MMM  C/V: Normal rate, normal peripheral perfusion  PULM: Nonlabored breathing, no respiratory distress,   ABD: Soft, ND, NT, no rebound tenderness, no guarding  EXTREM: WWP, no edema, SCDs in place  NEURO: No focal deficits    LABS:                        9.4    6.44  )-----------( 116      ( 06 Oct 2023 05:41 )             30.7     10-06    138  |  106  |  31<H>  ----------------------------<  129<H>  3.9   |  24  |  1.14    Ca    8.6      06 Oct 2023 05:41  Phos  2.6     10-06  Mg     2.0     10-06    TPro  7.8  /  Alb  2.2<L>  /  TBili  3.4<H>  /  DBili  x   /  AST  73<H>  /  ALT  77<H>  /  AlkPhos  154<H>  10-05      Urinalysis Basic - ( 06 Oct 2023 05:41 )    Color: x / Appearance: x / SG: x / pH: x  Gluc: 129 mg/dL / Ketone: x  / Bili: x / Urobili: x   Blood: x / Protein: x / Nitrite: x   Leuk Esterase: x / RBC: x / WBC x   Sq Epi: x / Non Sq Epi: x / Bacteria: x        RADIOLOGY & ADDITIONAL STUDIES:

## 2023-10-06 NOTE — PROGRESS NOTE ADULT - ATTENDING COMMENTS
78 year old male with hx crohn's, atrial fibrillation s/p SBR for SBO with ileocolic resection and ileostomy, on TPN.  perc cony tube capped.  AXR today. Increasing non protein calories in TPN. 78 year old male with hx crohn's, atrial fibrillation s/p SBR for SBO with ileocolic resection and ileostomy, on TPN.  perc cony tube capped.  AXR today.

## 2023-10-07 NOTE — PROGRESS NOTE ADULT - SUBJECTIVE AND OBJECTIVE BOX
INTERVAL/OVERNIGHT EVENTS: NAEO.    SUBJECTIVE: Doing well this AM, no N/V or abd pain.    Neurologic Medications  ondansetron Injectable 4 milliGRAM(s) IV Push every 8 hours PRN Nausea and/or Vomiting    Cardiovascular Medications  amLODIPine   Tablet 10 milliGRAM(s) Oral daily  metoprolol tartrate Injectable 5 milliGRAM(s) IV Push every 6 hours    Gastrointestinal Medications  diphenoxylate/atropine 2 Tablet(s) Oral every 6 hours  lipid, fat emulsion (Fish Oil and Plant Based) 20% Infusion 1.15 Gm/kG/Day IV Continuous <Continuous>  loperamide 4 milliGRAM(s) Oral every 6 hours  Parenteral Nutrition - Adult 1 Each TPN Continuous <Continuous>    Hematologic/Oncologic Medications  heparin   Injectable 5000 Unit(s) SubCutaneous every 8 hours  influenza  Vaccine (HIGH DOSE) 0.7 milliLiter(s) IntraMuscular once    Endocrine/Metabolic Medications  glucagon  Injectable 1 milliGRAM(s) IntraMuscular once  levothyroxine Injectable 40 MICROGram(s) IV Push at bedtime    Topical/Other Medications  chlorhexidine 2% Cloths 1 Application(s) Topical <User Schedule>  nystatin Powder 1 Application(s) Topical three times a day    MEDICATIONS  (PRN):  ondansetron Injectable 4 milliGRAM(s) IV Push every 8 hours PRN Nausea and/or Vomiting    I&O's Detail    06 Oct 2023 07:01  -  07 Oct 2023 07:00  --------------------------------------------------------  IN:    Fat Emulsion (Fish Oil &amp; Plant Based) 20% Infusion: 624.2 mL    TPN (Total Parenteral Nutrition): 2288 mL  Total IN: 2912.2 mL    OUT:    Bulb (mL): 50 mL    Drain (mL): 765 mL    Drain (mL): 30 mL    Voided (mL): 1850 mL  Total OUT: 2695 mL    Total NET: 217.2 mL    07 Oct 2023 07:01  -  07 Oct 2023 11:27  --------------------------------------------------------  IN:    TPN (Total Parenteral Nutrition): 312 mL  Total IN: 312 mL    OUT:    Drain (mL): 200 mL    Voided (mL): 275 mL  Total OUT: 475 mL    Total NET: -163 mL    Vital Signs Last 24 Hrs  T(C): 36.5 (07 Oct 2023 07:06), Max: 37 (07 Oct 2023 05:53)  T(F): 97.7 (07 Oct 2023 07:06), Max: 98.6 (07 Oct 2023 05:53)  HR: 89 (07 Oct 2023 10:00) (87 - 93)  BP: 129/62 (07 Oct 2023 10:00) (115/58 - 139/62)  BP(mean): 65 (07 Oct 2023 10:00) (65 - 89)  RR: 17 (07 Oct 2023 10:00) (16 - 26)  SpO2: 96% (07 Oct 2023 10:00) (96% - 99%)    Parameters below as of 07 Oct 2023 10:00  Patient On (Oxygen Delivery Method): room air    GENERAL: NAD, resting comfortably in bed  HEENT: NCAT, MMM  C/V: Normal rate, normal peripheral perfusion, no murmurs  PULM: Nonlabored breathing, no respiratory distress, CTA bl  ABD: Soft, ND, NT, no rebound tenderness, no guarding, wound manager in place  EXTREM: WWP, no edema, SCDs in place  NEURO: No focal deficits    LABS:                        9.4    6.44  )-----------( 116      ( 06 Oct 2023 05:41 )             30.7     10-06    138  |  106  |  31<H>  ----------------------------<  129<H>  3.9   |  24  |  1.14    Ca    8.6      06 Oct 2023 05:41  Phos  2.6     10-06  Mg     2.0     10-06    Urinalysis Basic - ( 06 Oct 2023 05:41 )    Color: x / Appearance: x / SG: x / pH: x  Gluc: 129 mg/dL / Ketone: x  / Bili: x / Urobili: x   Blood: x / Protein: x / Nitrite: x   Leuk Esterase: x / RBC: x / WBC x   Sq Epi: x / Non Sq Epi: x / Bacteria: x

## 2023-10-07 NOTE — PROGRESS NOTE ADULT - NS ATTEND AMEND GEN_ALL_CORE FT
Crohn's, AF, s/p SBR, ileocolic resection, ileostomy, enteroatmospheric fistula  physical as above  TPN written  continue metoprolol  continue norvasc  LFT stable  rest as above

## 2023-10-07 NOTE — PROGRESS NOTE ADULT - SUBJECTIVE AND OBJECTIVE BOX
SUBJECTIVE:  Flatus: [ ] YES [ ] NO             Bowel Movement: [ ] YES [ ] NO  Pain (0-10):            Pain Control Adequate: [ ] YES [ ] NO  Nausea: [ ] YES [ ] NO            Vomiting: [ ] YES [ ] NO  Diarrhea: [ ] YES [ ] NO         Constipation: [ ] YES [ ] NO     Chest Pain: [ ] YES [ ] NO    SOB:  [ ] YES [ ] NO    MEDICATIONS  (STANDING):  amLODIPine   Tablet 10 milliGRAM(s) Oral daily  chlorhexidine 2% Cloths 1 Application(s) Topical <User Schedule>  diphenoxylate/atropine 2 Tablet(s) Oral every 6 hours  glucagon  Injectable 1 milliGRAM(s) IntraMuscular once  heparin   Injectable 5000 Unit(s) SubCutaneous every 8 hours  influenza  Vaccine (HIGH DOSE) 0.7 milliLiter(s) IntraMuscular once  levothyroxine Injectable 40 MICROGram(s) IV Push at bedtime  lipid, fat emulsion (Fish Oil and Plant Based) 20% Infusion 1.15 Gm/kG/Day (41.67 mL/Hr) IV Continuous <Continuous>  loperamide 4 milliGRAM(s) Oral every 6 hours  metoprolol tartrate Injectable 5 milliGRAM(s) IV Push every 6 hours  nystatin Powder 1 Application(s) Topical three times a day  Parenteral Nutrition - Adult 1 Each (104 mL/Hr) TPN Continuous <Continuous>    MEDICATIONS  (PRN):  ondansetron Injectable 4 milliGRAM(s) IV Push every 8 hours PRN Nausea and/or Vomiting      Vital Signs Last 24 Hrs  T(C): 36.9 (07 Oct 2023 01:19), Max: 37 (06 Oct 2023 11:00)  T(F): 98.4 (07 Oct 2023 01:19), Max: 98.6 (06 Oct 2023 11:00)  HR: 90 (07 Oct 2023 04:00) (87 - 93)  BP: 139/62 (07 Oct 2023 04:00) (115/58 - 139/62)  BP(mean): 89 (07 Oct 2023 04:00) (73 - 89)  RR: 16 (07 Oct 2023 04:00) (16 - 26)  SpO2: 99% (07 Oct 2023 00:00) (93% - 100%)    Parameters below as of 07 Oct 2023 04:00  Patient On (Oxygen Delivery Method): room air        Physical Exam:  General: NAD, resting comfortably in bed  Pulmonary: Nonlabored breathing, no respiratory distress  Cardiovascular: NSR  Abdominal: soft, NT/ND  Extremities: WWP, normal strength  Neuro: A/O x 3, CNs II-XII grossly intact, no focal deficits, normal motor/sensation  Pulses: palpable distal pulses    I&O's Summary    05 Oct 2023 07:01  -  06 Oct 2023 07:00  --------------------------------------------------------  IN: 2871.8 mL / OUT: 3487 mL / NET: -615.2 mL    06 Oct 2023 07:01  -  07 Oct 2023 06:08  --------------------------------------------------------  IN: 2184.2 mL / OUT: 1665 mL / NET: 519.2 mL        LABS:                        9.4    6.44  )-----------( 116      ( 06 Oct 2023 05:41 )             30.7     10-06    138  |  106  |  31<H>  ----------------------------<  129<H>  3.9   |  24  |  1.14    Ca    8.6      06 Oct 2023 05:41  Phos  2.6     10-06  Mg     2.0     10-06        Urinalysis Basic - ( 06 Oct 2023 05:41 )    Color: x / Appearance: x / SG: x / pH: x  Gluc: 129 mg/dL / Ketone: x  / Bili: x / Urobili: x   Blood: x / Protein: x / Nitrite: x   Leuk Esterase: x / RBC: x / WBC x   Sq Epi: x / Non Sq Epi: x / Bacteria: x      CAPILLARY BLOOD GLUCOSE            RADIOLOGY & ADDITIONAL STUDIES:       SUBJECTIVE:  Patient was evaluated at bedside this AM. Patient is doing well this morning and has no complaints.    MEDICATIONS  (STANDING):  amLODIPine   Tablet 10 milliGRAM(s) Oral daily  chlorhexidine 2% Cloths 1 Application(s) Topical <User Schedule>  diphenoxylate/atropine 2 Tablet(s) Oral every 6 hours  glucagon  Injectable 1 milliGRAM(s) IntraMuscular once  heparin   Injectable 5000 Unit(s) SubCutaneous every 8 hours  influenza  Vaccine (HIGH DOSE) 0.7 milliLiter(s) IntraMuscular once  levothyroxine Injectable 40 MICROGram(s) IV Push at bedtime  lipid, fat emulsion (Fish Oil and Plant Based) 20% Infusion 1.15 Gm/kG/Day (41.67 mL/Hr) IV Continuous <Continuous>  loperamide 4 milliGRAM(s) Oral every 6 hours  metoprolol tartrate Injectable 5 milliGRAM(s) IV Push every 6 hours  nystatin Powder 1 Application(s) Topical three times a day  Parenteral Nutrition - Adult 1 Each (104 mL/Hr) TPN Continuous <Continuous>    MEDICATIONS  (PRN):  ondansetron Injectable 4 milliGRAM(s) IV Push every 8 hours PRN Nausea and/or Vomiting      Vital Signs Last 24 Hrs  T(C): 36.9 (07 Oct 2023 01:19), Max: 37 (06 Oct 2023 11:00)  T(F): 98.4 (07 Oct 2023 01:19), Max: 98.6 (06 Oct 2023 11:00)  HR: 90 (07 Oct 2023 04:00) (87 - 93)  BP: 139/62 (07 Oct 2023 04:00) (115/58 - 139/62)  BP(mean): 89 (07 Oct 2023 04:00) (73 - 89)  RR: 16 (07 Oct 2023 04:00) (16 - 26)  SpO2: 99% (07 Oct 2023 00:00) (93% - 100%)    Parameters below as of 07 Oct 2023 04:00  Patient On (Oxygen Delivery Method): room air        Physical Exam:  General: NAD, resting comfortably in bed  C/V: NSR  Pulm: Nonlabored breathing, no respiratory distress  Abd: Soft, ND, NT, no rebound tenderness, no guarding. Wound manager over midline wound with fistula/ostomy with bilious output. RLQ ileostomy, perc cony capped, JOYCE in LUQ     I&O's Summary    05 Oct 2023 07:01  -  06 Oct 2023 07:00  --------------------------------------------------------  IN: 2871.8 mL / OUT: 3487 mL / NET: -615.2 mL    06 Oct 2023 07:01  -  07 Oct 2023 06:08  --------------------------------------------------------  IN: 2184.2 mL / OUT: 1665 mL / NET: 519.2 mL        LABS:                        9.4    6.44  )-----------( 116      ( 06 Oct 2023 05:41 )             30.7     10-06    138  |  106  |  31<H>  ----------------------------<  129<H>  3.9   |  24  |  1.14    Ca    8.6      06 Oct 2023 05:41  Phos  2.6     10-06  Mg     2.0     10-06        Urinalysis Basic - ( 06 Oct 2023 05:41 )    Color: x / Appearance: x / SG: x / pH: x  Gluc: 129 mg/dL / Ketone: x  / Bili: x / Urobili: x   Blood: x / Protein: x / Nitrite: x   Leuk Esterase: x / RBC: x / WBC x   Sq Epi: x / Non Sq Epi: x / Bacteria: x      CAPILLARY BLOOD GLUCOSE            RADIOLOGY & ADDITIONAL STUDIES:

## 2023-10-08 NOTE — PROGRESS NOTE ADULT - SUBJECTIVE AND OBJECTIVE BOX
SUBJECTIVE: Patient seen and examined bedside by Surgical resident. Seen in bed resting comfortably, no complaints today,     amLODIPine   Tablet 10 milliGRAM(s) Oral daily  heparin   Injectable 5000 Unit(s) SubCutaneous every 8 hours  metoprolol tartrate Injectable 5 milliGRAM(s) IV Push every 6 hours    MEDICATIONS  (PRN):  ondansetron Injectable 4 milliGRAM(s) IV Push every 8 hours PRN Nausea and/or Vomiting      I&O's Detail    06 Oct 2023 07:01  -  07 Oct 2023 07:00  --------------------------------------------------------  IN:    Fat Emulsion (Fish Oil &amp; Plant Based) 20% Infusion: 624.2 mL    TPN (Total Parenteral Nutrition): 2288 mL  Total IN: 2912.2 mL    OUT:    Bulb (mL): 50 mL    Drain (mL): 765 mL    Drain (mL): 30 mL    Voided (mL): 1850 mL  Total OUT: 2695 mL    Total NET: 217.2 mL      07 Oct 2023 07:01  -  08 Oct 2023 00:41  --------------------------------------------------------  IN:    Fat Emulsion (Fish Oil &amp; Plant Based) 20% Infusion: 294 mL    Oral Fluid: 180 mL    TPN (Total Parenteral Nutrition): 1768 mL  Total IN: 2242 mL    OUT:    Bulb (mL): 5 mL    Drain (mL): 60 mL    Drain (mL): 1070 mL    Voided (mL): 1675 mL  Total OUT: 2810 mL    Total NET: -568 mL          Vital Signs Last 24 Hrs  T(C): 36.9 (07 Oct 2023 22:04), Max: 37 (07 Oct 2023 05:53)  T(F): 98.4 (07 Oct 2023 22:04), Max: 98.6 (07 Oct 2023 05:53)  HR: 90 (07 Oct 2023 20:00) (78 - 93)  BP: 120/58 (07 Oct 2023 20:00) (95/51 - 139/62)  BP(mean): 84 (07 Oct 2023 20:00) (65 - 89)  RR: 17 (07 Oct 2023 20:00) (13 - 22)  SpO2: 100% (07 Oct 2023 20:00) (95% - 100%)    Parameters below as of 07 Oct 2023 20:00  Patient On (Oxygen Delivery Method): room air        General: NAD, resting comfortably in bed  C/V: NSR  Pulm: Nonlabored breathing, no respiratory distress  Abd: soft, NT/ND, mid line wound clean  Extrem: WWP, no edema, SCDs in place  LINES: PIVs, LUE PICC (9/1- )      LABS:                        9.4    6.44  )-----------( 116      ( 06 Oct 2023 05:41 )             30.7     10-06    138  |  106  |  31<H>  ----------------------------<  129<H>  3.9   |  24  |  1.14    Ca    8.6      06 Oct 2023 05:41  Phos  2.6     10-06  Mg     2.0     10-06        Urinalysis Basic - ( 06 Oct 2023 05:41 )    Color: x / Appearance: x / SG: x / pH: x  Gluc: 129 mg/dL / Ketone: x  / Bili: x / Urobili: x   Blood: x / Protein: x / Nitrite: x   Leuk Esterase: x / RBC: x / WBC x   Sq Epi: x / Non Sq Epi: x / Bacteria: x        RADIOLOGY & ADDITIONAL STUDIES:     SUBJECTIVE: Patient seen and examined bedside by Surgical resident. Seen in bed resting comfortably, no complaints today.    amLODIPine   Tablet 10 milliGRAM(s) Oral daily  heparin   Injectable 5000 Unit(s) SubCutaneous every 8 hours  metoprolol tartrate Injectable 5 milliGRAM(s) IV Push every 6 hours    MEDICATIONS  (PRN):  ondansetron Injectable 4 milliGRAM(s) IV Push every 8 hours PRN Nausea and/or Vomiting      I&O's Detail    06 Oct 2023 07:01  -  07 Oct 2023 07:00  --------------------------------------------------------  IN:    Fat Emulsion (Fish Oil &amp; Plant Based) 20% Infusion: 624.2 mL    TPN (Total Parenteral Nutrition): 2288 mL  Total IN: 2912.2 mL    OUT:    Bulb (mL): 50 mL    Drain (mL): 765 mL    Drain (mL): 30 mL    Voided (mL): 1850 mL  Total OUT: 2695 mL    Total NET: 217.2 mL      07 Oct 2023 07:01  -  08 Oct 2023 00:41  --------------------------------------------------------  IN:    Fat Emulsion (Fish Oil &amp; Plant Based) 20% Infusion: 294 mL    Oral Fluid: 180 mL    TPN (Total Parenteral Nutrition): 1768 mL  Total IN: 2242 mL    OUT:    Bulb (mL): 5 mL    Drain (mL): 60 mL    Drain (mL): 1070 mL    Voided (mL): 1675 mL  Total OUT: 2810 mL    Total NET: -568 mL          Vital Signs Last 24 Hrs  T(C): 36.9 (07 Oct 2023 22:04), Max: 37 (07 Oct 2023 05:53)  T(F): 98.4 (07 Oct 2023 22:04), Max: 98.6 (07 Oct 2023 05:53)  HR: 90 (07 Oct 2023 20:00) (78 - 93)  BP: 120/58 (07 Oct 2023 20:00) (95/51 - 139/62)  BP(mean): 84 (07 Oct 2023 20:00) (65 - 89)  RR: 17 (07 Oct 2023 20:00) (13 - 22)  SpO2: 100% (07 Oct 2023 20:00) (95% - 100%)    Parameters below as of 07 Oct 2023 20:00  Patient On (Oxygen Delivery Method): room air        General: NAD, resting comfortably in bed  C/V: NSR  Pulm: Nonlabored breathing, no respiratory distress  Abd: soft, NT/ND, wound manager over midline wound with fistula/ostomy with bilious output. RLQ ileostomy, perc cony capped, JOYCE in LUQ   Extrem: WWP, no edema, SCDs in place  LINES: PIVs, LUE PICC (9/1- )      LABS:                        9.4    6.44  )-----------( 116      ( 06 Oct 2023 05:41 )             30.7     10-06    138  |  106  |  31<H>  ----------------------------<  129<H>  3.9   |  24  |  1.14    Ca    8.6      06 Oct 2023 05:41  Phos  2.6     10-06  Mg     2.0     10-06        Urinalysis Basic - ( 06 Oct 2023 05:41 )    Color: x / Appearance: x / SG: x / pH: x  Gluc: 129 mg/dL / Ketone: x  / Bili: x / Urobili: x   Blood: x / Protein: x / Nitrite: x   Leuk Esterase: x / RBC: x / WBC x   Sq Epi: x / Non Sq Epi: x / Bacteria: x        RADIOLOGY & ADDITIONAL STUDIES:

## 2023-10-08 NOTE — PROGRESS NOTE ADULT - NS ATTEND AMEND GEN_ALL_CORE FT
Crohn's, AF, SBR, ileocolic resection, end ileostomy  physical as above  continue metoprolol  continue TPN  renal function and LFTs are stable  continue telelmetry

## 2023-10-08 NOTE — PROGRESS NOTE ADULT - ASSESSMENT
78 M w/ Crohn's, AFib/Flutter s/p DCCVs on amiodarone, remote ileocectomy and open appendectomy. Admitted (6/23) for SBO vs Crohns flare, s/p NGT decompression and s/p lap TRE converted to open TRE, SBR x 3, left in discontinuity with abthera vac on (6/27), RTOR for ileocolic resection, small bowel anastomosis, and abdominal wall closure on (6/28), c/b fluid collection s/p IR aspiration of perihepatic fluid on (7/3), c/b wound dehiscence s/p RTOR exlap, washout, ileocolic resection with end ileostomy, blow hole colostomy, red rubber from ileostomy to small bowel anastomosis; vicryl bridging mesh on (7/5) transferred to SICU postoperatively for hemodynamic monitoring, with hospital course complicated by periods of AMS most recently on 9/1 and associated with oliguria, severe ELVI and hyponatremia, which resolved but now stepped back up for to SICU on 9/10 for acute AMS, intermittent hypoglycemia, AFib with RVR. Perc Choley tube placed on 9/11 for enlarged GB.    Continue TPN and NPO  Encourage OOB/ambulation  Continue octreotide, lomotil, immodium  Continue wound care evaluation   Rest of care per SICU team

## 2023-10-08 NOTE — PROGRESS NOTE ADULT - SUBJECTIVE AND OBJECTIVE BOX
Interval Events:  Patient seen and examined at bedside.      Allergies    penicillin (Angioedema)    Intolerances        Vital Signs Last 24 Hrs  T(C): 36.5 (08 Oct 2023 07:13), Max: 36.9 (07 Oct 2023 22:04)  T(F): 97.7 (08 Oct 2023 07:13), Max: 98.4 (07 Oct 2023 22:04)  HR: 90 (08 Oct 2023 08:00) (78 - 93)  BP: 124/61 (08 Oct 2023 08:00) (95/51 - 147/64)  BP(mean): 88 (08 Oct 2023 08:00) (65 - 92)  RR: 20 (08 Oct 2023 08:00) (13 - 22)  SpO2: 98% (08 Oct 2023 08:00) (95% - 100%)    Parameters below as of 08 Oct 2023 08:00  Patient On (Oxygen Delivery Method): room air        10-07 @ 07:01  -  10-08 @ 07:00  --------------------------------------------------------  IN: 3264 mL / OUT: 4215 mL / NET: -951 mL    10-08 @ 07:01  -  10-08 @ 09:55  --------------------------------------------------------  IN: 146 mL / OUT: 0 mL / NET: 146 mL      10-07 @ 07:01  -  10-08 @ 07:00  --------------------------------------------------------  IN: 3264 mL / OUT: 4215 mL / NET: -951 mL    10-08 @ 07:01  -  10-08 @ 09:55  --------------------------------------------------------  IN: 146 mL / OUT: 0 mL / NET: 146 mL        Physical Exam:   GENERAL: NAD, Awake alert   NEURO: No focal deficits good recall.   HEENT: NCAT, MMM  C/V: Normal rate, normal peripheral perfusion, no murmurs  PULM: Nonlabored breathing, no respiratory distress, CTA bl  ABD: Soft, ND, NT, no rebound tenderness, no guarding, wound manager in place  EXTREM: WWP, no edema, SCDs in place  Vasc: + DP b/l   Skin: no rashes noted  MSK: No joint swelling  Psych: No signs of anxiety or depression      LABS:      CBC Full  -  ( 08 Oct 2023 05:26 )  WBC Count : 6.81 K/uL  RBC Count : 2.88 M/uL  Hemoglobin : 8.5 g/dL  Hematocrit : 26.9 %  Platelet Count - Automated : 171 K/uL  Mean Cell Volume : 93.4 fl  Mean Cell Hemoglobin : 29.5 pg  Mean Cell Hemoglobin Concentration : 31.6 gm/dL  Auto Neutrophil # : x  Auto Lymphocyte # : x  Auto Monocyte # : x  Auto Eosinophil # : x  Auto Basophil # : x  Auto Neutrophil % : x  Auto Lymphocyte % : x  Auto Monocyte % : x  Auto Eosinophil % : x  Auto Basophil % : x    10-08    135  |  102  |  30<H>  ----------------------------<  118<H>  3.5   |  24  |  1.08    Ca    8.3<L>      08 Oct 2023 05:26  Phos  3.4     10-08  Mg     2.0     10-08    TPro  7.8  /  Alb  2.0<L>  /  TBili  3.0<H>  /  DBili  2.0<H>  /  AST  63<H>  /  ALT  70<H>  /  AlkPhos  145<H>  10-08          Urinalysis Basic - ( 08 Oct 2023 05:26 )    Color: x / Appearance: x / SG: x / pH: x  Gluc: 118 mg/dL / Ketone: x  / Bili: x / Urobili: x   Blood: x / Protein: x / Nitrite: x   Leuk Esterase: x / RBC: x / WBC x   Sq Epi: x / Non Sq Epi: x / Bacteria: x              RADIOLOGY & ADDITIONAL STUDIES (The following images were personally reviewed):          A/p: 78 M w/ Crohn's, AFib/Flutter s/p DCCVs on amiodarone, remote ileocectomy and open appendectomy. Admitted (6/23) for SBO vs Crohns flare, s/p NGT decompression and s/p lap TRE converted to open TRE, SBR x 3, left in discontinuity with abthera vac on (6/27), RTOR for ileocolic resection, small bowel anastomosis, and abdominal wall closure on (6/28), c/b fluid collection s/p IR aspiration of perihepatic fluid on (7/3), c/b wound dehiscence s/p RTOR exlap, washout, ileocolic resection with end ileostomy, blow hole colostomy, red rubber from ileostomy to small bowel anastomosis; vicryl bridging mesh on (7/5) transferred to SICU postoperatively for hemodynamic monitoring, with hospital course complicated by periods of AMS most recently on 9/1 and associated with oliguria, severe ELVI and hyponatremia, which resolved but now stepped back up for to SICU on 9/10 for acute AMS, intermittent hypoglycemia, AFib with RVR. Perc Choley tube placed on 9/11 for enlarged GB.     NEURO: AMS (resolved), EEG neg. Hx of depression - effexor Dc'd on 9/10 due to dilerium. Pain control with Dilaudid for vac changes only. Cont Melatonin prn Insomnia, Cont Zofran   HENT: Cepacol  CV: Hx Afib/flutter: s/p DCCV, Off amiodarone given transaminitis; Continue Metoprolol 5q6. Hx HLD: hold Lipitor given LFTs. TTE  (7/18) - PASP 64mmHg, EF 65-70%,  Hx HTN - improved on norvasc 10qd. Holding Losartan    PULM: no resp distress on room air.   GI/FEN: CT suspect delayed gastric emptying, d/c'd erythromycin (for gastric dysmotility). Cont NPO, Cont TPN for high ostomy and ECF output: Cont Octreotide in TPN. cont Imodium, and Lomotil. . PICC/TPN (Lipids 100gm, Dex@27/kg, AA @1.6). Transaminitis of unknown origin, now stable. s/p Perc Choley on 9/11. PPI.   : Voids, S/p ELVI resolved.  ENDO: mISS. Hx hypothyroid: IV Synthroid, TSH wnl on 9/10  ID: Numerous SICU admissions for sepsis. Currently off abx. Cont Nystatin powder   // PREVIOUSLY had C. tertium, Lactobacillis from his IR cx 7/3, and candida albicans, lactobacillus, vanc sensitive E faecium, vanc resistant E gallinarum, vanc resistant E casseliflavus, lactobacillus from his OR cx 7/5. Completed course of abx with Imipenem (9/10-9/15). Imipenem (8/26--) imipenem (6/30-7/12, 7/23-7/30), Dapto (6/30-7/5 and 7/23-7/24).  empiric dapto (8/23-24) and cefepime (8/23-24).   PPX: SCDs, SQH  LINES: PIVs, LUE PICC (9/1- )  WOUNDS/DRAINS: Abdominal wound and fistula with wound manager in place. Rt LQ Ostomy. JOYCE.   PT: Ambulate as tolerated   DISPO: SICU, q4 vitals   Interval Events:  No events overnight.   Patient seen and examined at bedside.      Allergies    penicillin (Angioedema)    Intolerances        Vital Signs Last 24 Hrs  T(C): 36.5 (08 Oct 2023 07:13), Max: 36.9 (07 Oct 2023 22:04)  T(F): 97.7 (08 Oct 2023 07:13), Max: 98.4 (07 Oct 2023 22:04)  HR: 90 (08 Oct 2023 08:00) (78 - 93)  BP: 124/61 (08 Oct 2023 08:00) (95/51 - 147/64)  BP(mean): 88 (08 Oct 2023 08:00) (65 - 92)  RR: 20 (08 Oct 2023 08:00) (13 - 22)  SpO2: 98% (08 Oct 2023 08:00) (95% - 100%)    Parameters below as of 08 Oct 2023 08:00  Patient On (Oxygen Delivery Method): room air        10-07 @ 07:01  -  10-08 @ 07:00  --------------------------------------------------------  IN: 3264 mL / OUT: 4215 mL / NET: -951 mL    10-08 @ 07:01  -  10-08 @ 09:55  --------------------------------------------------------  IN: 146 mL / OUT: 0 mL / NET: 146 mL      10-07 @ 07:01  -  10-08 @ 07:00  --------------------------------------------------------  IN: 3264 mL / OUT: 4215 mL / NET: -951 mL    10-08 @ 07:01  -  10-08 @ 09:55  --------------------------------------------------------  IN: 146 mL / OUT: 0 mL / NET: 146 mL        Physical Exam:   GENERAL: NAD, Awake alert   NEURO: No focal deficits good recall.   HEENT: NCAT, MMM  C/V: Normal rate, normal peripheral perfusion, no murmurs  PULM: Nonlabored breathing, no respiratory distress, CTA bl  ABD: Soft, ND, NT, no rebound tenderness, no guarding, wound manager in place  EXTREM: WWP, no edema, SCDs in place  Vasc: + DP b/l   Skin: no rashes noted  MSK: No joint swelling  Psych: No signs of anxiety or depression      LABS:      CBC Full  -  ( 08 Oct 2023 05:26 )  WBC Count : 6.81 K/uL  RBC Count : 2.88 M/uL  Hemoglobin : 8.5 g/dL  Hematocrit : 26.9 %  Platelet Count - Automated : 171 K/uL  Mean Cell Volume : 93.4 fl  Mean Cell Hemoglobin : 29.5 pg  Mean Cell Hemoglobin Concentration : 31.6 gm/dL  Auto Neutrophil # : x  Auto Lymphocyte # : x  Auto Monocyte # : x  Auto Eosinophil # : x  Auto Basophil # : x  Auto Neutrophil % : x  Auto Lymphocyte % : x  Auto Monocyte % : x  Auto Eosinophil % : x  Auto Basophil % : x    10-08    135  |  102  |  30<H>  ----------------------------<  118<H>  3.5   |  24  |  1.08    Ca    8.3<L>      08 Oct 2023 05:26  Phos  3.4     10-08  Mg     2.0     10-08    TPro  7.8  /  Alb  2.0<L>  /  TBili  3.0<H>  /  DBili  2.0<H>  /  AST  63<H>  /  ALT  70<H>  /  AlkPhos  145<H>  10-08          Urinalysis Basic - ( 08 Oct 2023 05:26 )    Color: x / Appearance: x / SG: x / pH: x  Gluc: 118 mg/dL / Ketone: x  / Bili: x / Urobili: x   Blood: x / Protein: x / Nitrite: x   Leuk Esterase: x / RBC: x / WBC x   Sq Epi: x / Non Sq Epi: x / Bacteria: x              RADIOLOGY & ADDITIONAL STUDIES (The following images were personally reviewed):          A/p: 78 M w/ Crohn's, AFib/Flutter s/p DCCVs on amiodarone, remote ileocectomy and open appendectomy. Admitted (6/23) for SBO vs Crohns flare, s/p NGT decompression and s/p lap TRE converted to open TRE, SBR x 3, left in discontinuity with abthera vac on (6/27), RTOR for ileocolic resection, small bowel anastomosis, and abdominal wall closure on (6/28), c/b fluid collection s/p IR aspiration of perihepatic fluid on (7/3), c/b wound dehiscence s/p RTOR exlap, washout, ileocolic resection with end ileostomy, blow hole colostomy, red rubber from ileostomy to small bowel anastomosis; vicryl bridging mesh on (7/5) transferred to SICU postoperatively for hemodynamic monitoring, with hospital course complicated by periods of AMS most recently on 9/1 and associated with oliguria, severe ELVI and hyponatremia, which resolved but now stepped back up for to SICU on 9/10 for acute AMS, intermittent hypoglycemia, AFib with RVR. Perc Choley tube placed on 9/11 for enlarged GB.     NEURO: AMS (resolved), EEG neg. Hx of depression - effexor Dc'd on 9/10 due to dilerium. Pain control with Dilaudid for vac changes only. Cont Melatonin prn Insomnia, Cont Zofran   HENT: Cepacol  CV: Hx Afib/flutter: s/p DCCV, Off amiodarone given transaminitis; Continue Metoprolol 5q6. Hx HLD: hold Lipitor given LFTs. TTE  (7/18) - PASP 64mmHg, EF 65-70%,  Hx HTN - improved on norvasc 10qd. Holding Losartan  Cont Lopressor 5 q6.   PULM: no resp distress on room air.   GI/FEN: CT suspect delayed gastric emptying, d/c'd erythromycin (for gastric dysmotility). Cont NPO, Cont TPN for high ostomy and ECF output: Cont Octreotide in TPN. cont Imodium, and Lomotil. PICC/TPN (Lipids 100gm, Dex@27/kg, AA @1.6). Transaminitis of unknown origin, now stable. s/p Perc Choley on 9/11. PPI.   : Voids, S/p ELVI resolved.  ENDO: No need to check ISS. Hx hypothyroid: IV Synthroid, TSH wnl on 9/10  ID: Numerous SICU admissions for sepsis. Currently off abx. Cont Nystatin powder // PREVIOUSLY had C. tertium, Lactobacillis from his IR cx 7/3, and candida albicans, lactobacillus, vanc sensitive E faecium, vanc resistant E gallinarum, vanc resistant E casseliflavus, lactobacillus from his OR cx 7/5. Completed course of abx with Imipenem (9/10-9/15). Imipenem (8/26--) imipenem (6/30-7/12, 7/23-7/30), Dapto (6/30-7/5 and 7/23-7/24).  empiric dapto (8/23-24) and cefepime (8/23-24).   PPX: SCDs, SQH  LINES: PIVs, LUE PICC (9/1- )  WOUNDS/DRAINS: Abdominal wound and fistula with wound manager in place. Rt LQ Ostomy. JOYCE.   PT: Ambulate as tolerated   DISPO: SICU, q4 vitals

## 2023-10-09 NOTE — PROGRESS NOTE ADULT - SUBJECTIVE AND OBJECTIVE BOX
Interval Events:  No events overnight.     Patient seen and examined at bedside.      Allergies    penicillin (Angioedema)    Intolerances        Vital Signs Last 24 Hrs  T(C): 37.1 (09 Oct 2023 06:04), Max: 37.1 (09 Oct 2023 06:04)  T(F): 98.8 (09 Oct 2023 06:04), Max: 98.8 (09 Oct 2023 06:04)  HR: 89 (09 Oct 2023 05:00) (88 - 90)  BP: 120/59 (09 Oct 2023 05:00) (113/59 - 135/72)  BP(mean): 85 (09 Oct 2023 05:00) (83 - 97)  RR: 18 (09 Oct 2023 05:00) (13 - 34)  SpO2: 95% (09 Oct 2023 05:00) (95% - 99%)    Parameters below as of 09 Oct 2023 05:00  Patient On (Oxygen Delivery Method): room air        10-08 @ 07:01  -  10-09 @ 07:00  --------------------------------------------------------  IN: 3463.4 mL / OUT: 4090 mL / NET: -626.6 mL      10-08 @ 07:01  -  10-09 @ 07:00  --------------------------------------------------------  IN: 3463.4 mL / OUT: 4090 mL / NET: -626.6 mL        Physical Exam:     Gen: NAD well nourished  Neuro: A&OX3 No deficits  CV:RRR Reg s1s2 noM  Pulm: CTA b/l No w/r/r  Abd: Soft NT ND + BS  Ext: No C/C/E   Vasc: + DP b/l   Skin: no rashes noted  MSK: No joint swelling  Psych: No signs of anxiety or depression      LABS:      CBC Full  -  ( 08 Oct 2023 05:26 )  WBC Count : 6.81 K/uL  RBC Count : 2.88 M/uL  Hemoglobin : 8.5 g/dL  Hematocrit : 26.9 %  Platelet Count - Automated : 171 K/uL  Mean Cell Volume : 93.4 fl  Mean Cell Hemoglobin : 29.5 pg  Mean Cell Hemoglobin Concentration : 31.6 gm/dL  Auto Neutrophil # : x  Auto Lymphocyte # : x  Auto Monocyte # : x  Auto Eosinophil # : x  Auto Basophil # : x  Auto Neutrophil % : x  Auto Lymphocyte % : x  Auto Monocyte % : x  Auto Eosinophil % : x  Auto Basophil % : x    10-08    135  |  102  |  30<H>  ----------------------------<  118<H>  3.5   |  24  |  1.08    Ca    8.3<L>      08 Oct 2023 05:26  Phos  3.4     10-08  Mg     2.0     10-08    TPro  7.8  /  Alb  2.0<L>  /  TBili  3.0<H>  /  DBili  2.0<H>  /  AST  63<H>  /  ALT  70<H>  /  AlkPhos  145<H>  10-08          Urinalysis Basic - ( 08 Oct 2023 05:26 )    Color: x / Appearance: x / SG: x / pH: x  Gluc: 118 mg/dL / Ketone: x  / Bili: x / Urobili: x   Blood: x / Protein: x / Nitrite: x   Leuk Esterase: x / RBC: x / WBC x   Sq Epi: x / Non Sq Epi: x / Bacteria: x              RADIOLOGY & ADDITIONAL STUDIES (The following images were personally reviewed):          A/p: A/p: 78 M w/ Crohn's, AFib/Flutter s/p DCCVs on amiodarone, remote ileocectomy and open appendectomy. Admitted (6/23) for SBO vs Crohns flare, s/p NGT decompression and s/p lap TRE converted to open RTE, SBR x 3, left in discontinuity with abthera vac on (6/27), RTOR for ileocolic resection, small bowel anastomosis, and abdominal wall closure on (6/28), c/b fluid collection s/p IR aspiration of perihepatic fluid on (7/3), c/b wound dehiscence s/p RTOR exlap, washout, ileocolic resection with end ileostomy, blow hole colostomy, red rubber from ileostomy to small bowel anastomosis; vicryl bridging mesh on (7/5) transferred to SICU postoperatively for hemodynamic monitoring, with hospital course complicated by periods of AMS most recently on 9/1 and associated with oliguria, severe ELVI and hyponatremia, which resolved but now stepped back up for to SICU on 9/10 for acute AMS, intermittent hypoglycemia, AFib with RVR. Perc Choley tube placed on 9/11 for enlarged GB.     NEURO: AMS (resolved), EEG neg. Hx of depression - effexor Dc'd on 9/10 due to dilerium. Pain control with Dilaudid for vac changes only. Cont Melatonin prn Insomnia, Cont Zofran   HENT: Cepacol  CV: Hx Afib/flutter: s/p DCCV, Off amiodarone given transaminitis; Continue Metoprolol 5q6. Hx HLD: hold Lipitor given LFTs. TTE  (7/18) - PASP 64mmHg, EF 65-70%,  Hx HTN - improved on norvasc 10qd. Holding Losartan  Cont Lopressor 5 q6. Poss Change to PO lopressor  PULM: no resp distress on room air.   GI/FEN: CT suspect delayed gastric emptying, d/c'd erythromycin (for gastric dysmotility). Cont NPO, Cont TPN for high ostomy and ECF output: Cont Octreotide in TPN. cont Imodium, and Lomotil. PICC/TPN (Lipids 100gm, Dex@27/kg, AA @1.6). Transaminitis of unknown origin, now stable. s/p Perc Choley on 9/11. PPI.   : Voids, S/p ELVI resolved.  ENDO: No need to check ISS. Hx hypothyroid: IV Synthroid, TSH wnl on 9/10  ID: Numerous SICU admissions for sepsis. Currently off abx. Cont Nystatin powder // PREVIOUSLY had C. tertium, Lactobacillis from his IR cx 7/3, and candida albicans, lactobacillus, vanc sensitive E faecium, vanc resistant E gallinarum, vanc resistant E casseliflavus, lactobacillus from his OR cx 7/5. Completed course of abx with Imipenem (9/10-9/15). Imipenem (8/26--) imipenem (6/30-7/12, 7/23-7/30), Dapto (6/30-7/5 and 7/23-7/24).  empiric dapto (8/23-24) and cefepime (8/23-24).   PPX: SCDs, SQH  LINES: PIVs, LUE PICC (9/1- )  WOUNDS/DRAINS: Abdominal wound and fistula with wound manager in place. Rt LQ Ostomy. JOYCE.   PT: Ambulate as tolerated   DISPO: SICU, q4 vitals Interval Events:  No events overnight.     Patient seen and examined at bedside.      Allergies    penicillin (Angioedema)    Intolerances        Vital Signs Last 24 Hrs  T(C): 37.1 (09 Oct 2023 06:04), Max: 37.1 (09 Oct 2023 06:04)  T(F): 98.8 (09 Oct 2023 06:04), Max: 98.8 (09 Oct 2023 06:04)  HR: 89 (09 Oct 2023 05:00) (88 - 90)  BP: 120/59 (09 Oct 2023 05:00) (113/59 - 135/72)  BP(mean): 85 (09 Oct 2023 05:00) (83 - 97)  RR: 18 (09 Oct 2023 05:00) (13 - 34)  SpO2: 95% (09 Oct 2023 05:00) (95% - 99%)    Parameters below as of 09 Oct 2023 05:00  Patient On (Oxygen Delivery Method): room air        10-08 @ 07:01  -  10-09 @ 07:00  --------------------------------------------------------  IN: 3463.4 mL / OUT: 4090 mL / NET: -626.6 mL      10-08 @ 07:01  -  10-09 @ 07:00  --------------------------------------------------------  IN: 3463.4 mL / OUT: 4090 mL / NET: -626.6 mL        Physical Exam:       GENERAL: NAD, Awake alert   NEURO: A&O x3 No focal deficits good recall.   HEENT: NCAT, MMM  C/V: Normal rate, normal peripheral perfusion, no murmurs  PULM: Nonlabored breathing, no respiratory distress, CTA bl  ABD: Soft, ND, NT, no rebound tenderness, no guarding, wound manager in place draining bilious fluid  EXTREM: WWP, no edema, SCDs in place  Vasc: + DP b/l   Skin: no rashes noted  MSK: No joint swelling  Psych: No signs of anxiety or depression            LABS:      CBC Full  -  ( 08 Oct 2023 05:26 )  WBC Count : 6.81 K/uL  RBC Count : 2.88 M/uL  Hemoglobin : 8.5 g/dL  Hematocrit : 26.9 %  Platelet Count - Automated : 171 K/uL  Mean Cell Volume : 93.4 fl  Mean Cell Hemoglobin : 29.5 pg  Mean Cell Hemoglobin Concentration : 31.6 gm/dL  Auto Neutrophil # : x  Auto Lymphocyte # : x  Auto Monocyte # : x  Auto Eosinophil # : x  Auto Basophil # : x  Auto Neutrophil % : x  Auto Lymphocyte % : x  Auto Monocyte % : x  Auto Eosinophil % : x  Auto Basophil % : x    10-08    135  |  102  |  30<H>  ----------------------------<  118<H>  3.5   |  24  |  1.08    Ca    8.3<L>      08 Oct 2023 05:26  Phos  3.4     10-08  Mg     2.0     10-08    TPro  7.8  /  Alb  2.0<L>  /  TBili  3.0<H>  /  DBili  2.0<H>  /  AST  63<H>  /  ALT  70<H>  /  AlkPhos  145<H>  10-08          Urinalysis Basic - ( 08 Oct 2023 05:26 )    Color: x / Appearance: x / SG: x / pH: x  Gluc: 118 mg/dL / Ketone: x  / Bili: x / Urobili: x   Blood: x / Protein: x / Nitrite: x   Leuk Esterase: x / RBC: x / WBC x   Sq Epi: x / Non Sq Epi: x / Bacteria: x              RADIOLOGY & ADDITIONAL STUDIES (The following images were personally reviewed):          A/p: A/p: 78 M w/ Crohn's, AFib/Flutter s/p DCCVs on amiodarone, remote ileocectomy and open appendectomy. Admitted (6/23) for SBO vs Crohns flare, s/p NGT decompression and s/p lap TRE converted to open TRE, SBR x 3, left in discontinuity with abthera vac on (6/27), RTOR for ileocolic resection, small bowel anastomosis, and abdominal wall closure on (6/28), c/b fluid collection s/p IR aspiration of perihepatic fluid on (7/3), c/b wound dehiscence s/p RTOR exlap, washout, ileocolic resection with end ileostomy, blow hole colostomy, red rubber from ileostomy to small bowel anastomosis; vicryl bridging mesh on (7/5) transferred to SICU postoperatively for hemodynamic monitoring, with hospital course complicated by periods of AMS most recently on 9/1 and associated with oliguria, severe ELVI and hyponatremia, which resolved but now stepped back up for to SICU on 9/10 for acute AMS, intermittent hypoglycemia, AFib with RVR. Perc Choley tube placed on 9/11 for enlarged GB.     NEURO: AMS (resolved), EEG neg. Hx of depression - Effexor Dc'd on 9/10 due to dilerium. Pain control with Dilaudid for vac changes only. Cont Melatonin prn Insomnia, Cont Zofran   HENT: Cepacol  CV: Hx Afib/flutter: s/p DCCV, Off amiodarone given transaminitis; Continue Metoprolol 5q6. Hx HLD: hold Lipitor given LFTs. TTE  (7/18) - PASP 64mmHg, EF 65-70%,  Hx HTN - improved on Norvasc 10qd. Holding Losartan  Cont Lopressor 5 q6. Poss Change to PO lopressor  PULM: no resp distress on room air.   GI/FEN: CT suspect delayed gastric emptying, d/c'd erythromycin (for gastric dysmotility). Cont NPO, Cont TPN for high ostomy and ECF output: Cont Octreotide in TPN. cont Imodium, and Lomotil. PICC/TPN (Lipids 100gm, Dex@27/kg, AA @1.6). Transaminitis of unknown origin, now stable. s/p Perc Choley on 9/11. PPI.   : Voids, S/p ELVI resolved.  ENDO: No need to check ISS. Hx hypothyroid: IV Synthroid, TSH wnl on 9/10  ID: Numerous SICU admissions for sepsis. Currently off abx. Cont Nystatin powder // PREVIOUSLY had C. tertium, Lactobacillis from his IR cx 7/3, and candida albicans, lactobacillus, vanc sensitive E faecium, vanc resistant E gallinarum, vanc resistant E casseliflavus, lactobacillus from his OR cx 7/5. Completed course of abx with Imipenem (9/10-9/15). Imipenem (8/26--) imipenem (6/30-7/12, 7/23-7/30), Dapto (6/30-7/5 and 7/23-7/24).  empiric dapto (8/23-24) and cefepime (8/23-24).   PPX: SCDs, SQH  LINES: PIVs, LUE PICC (9/1- )  WOUNDS/DRAINS: Abdominal wound and fistula with wound manager in place dressing change M &F . Rt LQ Ostomy. JOYCE.   PT: Ambulate as tolerated   DISPO: SICU, q4 vitals Interval Events:  No events overnight.     Patient seen and examined at bedside.      Allergies    penicillin (Angioedema)    Intolerances        Vital Signs Last 24 Hrs  T(C): 37.1 (09 Oct 2023 06:04), Max: 37.1 (09 Oct 2023 06:04)  T(F): 98.8 (09 Oct 2023 06:04), Max: 98.8 (09 Oct 2023 06:04)  HR: 89 (09 Oct 2023 05:00) (88 - 90)  BP: 120/59 (09 Oct 2023 05:00) (113/59 - 135/72)  BP(mean): 85 (09 Oct 2023 05:00) (83 - 97)  RR: 18 (09 Oct 2023 05:00) (13 - 34)  SpO2: 95% (09 Oct 2023 05:00) (95% - 99%)    Parameters below as of 09 Oct 2023 05:00  Patient On (Oxygen Delivery Method): room air        10-08 @ 07:01  -  10-09 @ 07:00  --------------------------------------------------------  IN: 3463.4 mL / OUT: 4090 mL / NET: -626.6 mL      10-08 @ 07:01  -  10-09 @ 07:00  --------------------------------------------------------  IN: 3463.4 mL / OUT: 4090 mL / NET: -626.6 mL        Physical Exam:       GENERAL: NAD, Awake alert   NEURO: A&O x3 No focal deficits good recall.   HEENT: NCAT, MMM  C/V: Normal rate, normal peripheral perfusion, no murmurs  PULM: Nonlabored breathing, no respiratory distress, CTA bl  ABD: Soft, ND, NT, no rebound tenderness, no guarding, wound manager in place draining bilious fluid  EXTREM: WWP, no edema, SCDs in place  Vasc: + DP b/l   Skin: no rashes noted  MSK: No joint swelling  Psych: No signs of anxiety or depression            LABS:      CBC Full  -  ( 08 Oct 2023 05:26 )  WBC Count : 6.81 K/uL  RBC Count : 2.88 M/uL  Hemoglobin : 8.5 g/dL  Hematocrit : 26.9 %  Platelet Count - Automated : 171 K/uL  Mean Cell Volume : 93.4 fl  Mean Cell Hemoglobin : 29.5 pg  Mean Cell Hemoglobin Concentration : 31.6 gm/dL  Auto Neutrophil # : x  Auto Lymphocyte # : x  Auto Monocyte # : x  Auto Eosinophil # : x  Auto Basophil # : x  Auto Neutrophil % : x  Auto Lymphocyte % : x  Auto Monocyte % : x  Auto Eosinophil % : x  Auto Basophil % : x    10-08    135  |  102  |  30<H>  ----------------------------<  118<H>  3.5   |  24  |  1.08    Ca    8.3<L>      08 Oct 2023 05:26  Phos  3.4     10-08  Mg     2.0     10-08    TPro  7.8  /  Alb  2.0<L>  /  TBili  3.0<H>  /  DBili  2.0<H>  /  AST  63<H>  /  ALT  70<H>  /  AlkPhos  145<H>  10-08          Urinalysis Basic - ( 08 Oct 2023 05:26 )    Color: x / Appearance: x / SG: x / pH: x  Gluc: 118 mg/dL / Ketone: x  / Bili: x / Urobili: x   Blood: x / Protein: x / Nitrite: x   Leuk Esterase: x / RBC: x / WBC x   Sq Epi: x / Non Sq Epi: x / Bacteria: x              RADIOLOGY & ADDITIONAL STUDIES (The following images were personally reviewed):           A/p: 78 M w/ Crohn's, AFib/Flutter s/p DCCVs on amiodarone, remote ileocectomy and open appendectomy. Admitted (6/23) for SBO vs Crohns flare, s/p NGT decompression and s/p lap TRE converted to open TRE, SBR x 3, left in discontinuity with abthera vac on (6/27), RTOR for ileocolic resection, small bowel anastomosis, and abdominal wall closure on (6/28), c/b fluid collection s/p IR aspiration of perihepatic fluid on (7/3), c/b wound dehiscence s/p RTOR exlap, washout, ileocolic resection with end ileostomy, blow hole colostomy, red rubber from ileostomy to small bowel anastomosis; vicryl bridging mesh on (7/5) transferred to SICU postoperatively for hemodynamic monitoring, with hospital course complicated by periods of AMS most recently on 9/1 and associated with oliguria, severe ELVI and hyponatremia, which resolved but now stepped back up for to SICU on 9/10 for acute AMS, intermittent hypoglycemia, AFib with RVR. Perc Choley tube placed on 9/11 for enlarged GB.     NEURO: AMS (resolved), EEG neg. Hx of depression - Effexor Dc'd on 9/10 due to delirium Pain control with Dilaudid for vac changes only. Cont Melatonin prn Insomnia, Cont Zofran   HENT: Cepacol  CV: Hx Afib/flutter: s/p DCCV, Off amiodarone given transaminitis; Continue Metoprolol 5q6. Hx HLD: hold Lipitor given LFTs. TTE  (7/18) - PASP 64mmHg, EF 65-70%,  Hx HTN - improved on Norvasc 10qd. Holding Losartan  Cont Lopressor 5 q6. Poss Change to PO lopressor  PULM: no resp distress on room air.   GI/FEN: CT suspect delayed gastric emptying, d/c'd erythromycin (for gastric dysmotility). Cont NPO, Cont TPN for high ostomy and ECF output: Cont Octreotide in TPN. cont Tincture of opium 2mg q6, Imodium, and Lomotil. PICC/TPN (Lipids 100gm, Dex@27/kg, AA @1.6). Transaminitis of unknown origin, now stable. s/p Perc Choley on 9/11. PPI.   : Voids, S/p ELVI resolved.  ENDO: No need to check ISS. Hx hypothyroid: IV Synthroid, TSH wnl on 9/10  ID: Numerous SICU admissions for sepsis. Currently off abx. Cont Nystatin powder // PREVIOUSLY had C. tertium, Lactobacillis from his IR cx 7/3, and candida albicans, lactobacillus, vanc sensitive E faecium, vanc resistant E gallinarum, vanc resistant E casseliflavus, lactobacillus from his OR cx 7/5. Completed course of abx with Imipenem (9/10-9/15). Imipenem (8/26--) imipenem (6/30-7/12, 7/23-7/30), Dapto (6/30-7/5 and 7/23-7/24).  empiric dapto (8/23-24) and cefepime (8/23-24).   PPX: SCDs, SQH  LINES: PIVs, LUE PICC (9/1- )  WOUNDS/DRAINS: Abdominal wound and fistula with wound manager in place dressing change M &F . Rt LQ Ostomy. JOYCE.   PT: Ambulate as tolerated   DISPO: SICU, q4 vitals

## 2023-10-09 NOTE — PROGRESS NOTE ADULT - NS ATTEND AMEND GEN_ALL_CORE FT
78 year old male with hx crohn's, atrial fibrillation s/p SBR for SBO with ileocolic resection and ileostomy, on TPN.  perc cony tube capped. Some increased gut output. Trialling ensure clears with tincture of opium. Close monitoring of output.

## 2023-10-09 NOTE — PROGRESS NOTE ADULT - SUBJECTIVE AND OBJECTIVE BOX
SUBJECTIVE: Patient seen and examined at bedside with chief resident. Patient found resting comfortably in bed with no acute complaints.       amLODIPine   Tablet 10 milliGRAM(s) Oral daily  heparin   Injectable 5000 Unit(s) SubCutaneous every 8 hours  metoprolol tartrate Injectable 5 milliGRAM(s) IV Push every 6 hours      Vital Signs Last 24 Hrs  T(C): 37.1 (09 Oct 2023 06:04), Max: 37.1 (09 Oct 2023 06:04)  T(F): 98.8 (09 Oct 2023 06:04), Max: 98.8 (09 Oct 2023 06:04)  HR: 89 (09 Oct 2023 05:00) (88 - 90)  BP: 120/59 (09 Oct 2023 05:00) (113/59 - 135/72)  BP(mean): 85 (09 Oct 2023 05:00) (83 - 97)  RR: 18 (09 Oct 2023 05:00) (13 - 34)  SpO2: 95% (09 Oct 2023 05:00) (95% - 99%)    Parameters below as of 09 Oct 2023 05:00  Patient On (Oxygen Delivery Method): room air      I&O's Detail    08 Oct 2023 07:01  -  09 Oct 2023 07:00  --------------------------------------------------------  IN:    Fat Emulsion (Fish Oil &amp; Plant Based) 20% Infusion: 42 mL    Fat Emulsion (Fish Oil &amp; Plant Based) 20% Infusion: 625.4 mL    IV PiggyBack: 300 mL    TPN (Total Parenteral Nutrition): 2496 mL  Total IN: 3463.4 mL    OUT:    Bulb (mL): 20 mL    Drain (mL): 100 mL    Drain (mL): 1220 mL    Voided (mL): 2800 mL  Total OUT: 4140 mL    Total NET: -676.6 mL      09 Oct 2023 07:01  -  09 Oct 2023 08:20  --------------------------------------------------------  IN:    TPN (Total Parenteral Nutrition): 104 mL  Total IN: 104 mL    OUT:  Total OUT: 0 mL    Total NET: 104 mL      General: NAD, resting comfortably in bed  C/V: NSR  Pulm: Nonlabored breathing, no respiratory distress  Abd: soft, NT/ND. wound manager in place, ostomy with bilious output  Extrem: WWP, no edema, SCDs in place      LABS:                        8.5    6.81  )-----------( 171      ( 08 Oct 2023 05:26 )             26.9     10-08    135  |  102  |  30<H>  ----------------------------<  118<H>  3.5   |  24  |  1.08    Ca    8.3<L>      08 Oct 2023 05:26  Phos  3.4     10-08  Mg     2.0     10-08    TPro  7.8  /  Alb  2.0<L>  /  TBili  3.0<H>  /  DBili  2.0<H>  /  AST  63<H>  /  ALT  70<H>  /  AlkPhos  145<H>  10-08      Urinalysis Basic - ( 08 Oct 2023 05:26 )    Color: x / Appearance: x / SG: x / pH: x  Gluc: 118 mg/dL / Ketone: x  / Bili: x / Urobili: x   Blood: x / Protein: x / Nitrite: x   Leuk Esterase: x / RBC: x / WBC x   Sq Epi: x / Non Sq Epi: x / Bacteria: x

## 2023-10-09 NOTE — PROGRESS NOTE ADULT - ASSESSMENT
78 M w/ Crohn's, AFib/Flutter s/p DCCVs on amiodarone, remote ileocectomy and open appendectomy. Admitted (6/23) for SBO vs Crohns flare, s/p NGT decompression and s/p lap TRE converted to open TRE, SBR x 3, left in discontinuity with abthera vac on (6/27), RTOR for ileocolic resection, small bowel anastomosis, and abdominal wall closure on (6/28), c/b fluid collection s/p IR aspiration of perihepatic fluid on (7/3), c/b wound dehiscence s/p RTOR exlap, washout, ileocolic resection with end ileostomy, blow hole colostomy, red rubber from ileostomy to small bowel anastomosis; vicryl bridging mesh on (7/5) transferred to SICU postoperatively for hemodynamic monitoring, with hospital course complicated by periods of AMS most recently on 9/1 and associated with oliguria, severe ELVI and hyponatremia, which resolved but now stepped back up for to SICU on 9/10 for acute AMS, intermittent hypoglycemia, AFib with RVR. Perc Choley tube placed on 9/11 for enlarged GB.    Continue TPN  NPO with sips and chips  Encourage OOB/ambulation  Continue IV medications  Continue octreotide, lomotil, imodium  Continue wound care evaluation   Rest of care per SICU team

## 2023-10-09 NOTE — PROGRESS NOTE ADULT - SUBJECTIVE AND OBJECTIVE BOX
INTERVAL HPI/OVERNIGHT EVENTS:  O/N: AMRITA    SUBJECTIVE:  Patient seen and examined by chief resident and team on AM rounds. Patient appears well without any acute complaints.     MEDICATIONS  (STANDING):  amLODIPine   Tablet 10 milliGRAM(s) Oral daily  chlorhexidine 2% Cloths 1 Application(s) Topical <User Schedule>  diphenoxylate/atropine 2 Tablet(s) Oral every 6 hours  glucagon  Injectable 1 milliGRAM(s) IntraMuscular once  heparin   Injectable 5000 Unit(s) SubCutaneous every 8 hours  influenza  Vaccine (HIGH DOSE) 0.7 milliLiter(s) IntraMuscular once  levothyroxine Injectable 40 MICROGram(s) IV Push at bedtime  lipid, fat emulsion (Fish Oil and Plant Based) 20% Infusion 1.15 Gm/kG/Day (41.67 mL/Hr) IV Continuous <Continuous>  loperamide 4 milliGRAM(s) Oral every 6 hours  metoprolol tartrate Injectable 5 milliGRAM(s) IV Push every 6 hours  nystatin Powder 1 Application(s) Topical three times a day  opium Tincture 6 milliGRAM(s) Oral four times a day  Parenteral Nutrition - Adult 1 Each (104 mL/Hr) TPN Continuous <Continuous>  Parenteral Nutrition - Adult 1 Each (104 mL/Hr) TPN Continuous <Continuous>    MEDICATIONS  (PRN):  ondansetron Injectable 4 milliGRAM(s) IV Push every 8 hours PRN Nausea and/or Vomiting      Vital Signs Last 24 Hrs  T(C): 37.1 (09 Oct 2023 06:04), Max: 37.1 (09 Oct 2023 06:04)  T(F): 98.8 (09 Oct 2023 06:04), Max: 98.8 (09 Oct 2023 06:04)  HR: 87 (09 Oct 2023 13:00) (87 - 92)  BP: 118/54 (09 Oct 2023 13:00) (106/63 - 135/72)  BP(mean): 78 (09 Oct 2023 13:00) (76 - 97)  RR: 23 (09 Oct 2023 13:00) (13 - 34)  SpO2: 100% (09 Oct 2023 13:00) (95% - 100%)    Parameters below as of 09 Oct 2023 13:00  Patient On (Oxygen Delivery Method): room air        PHYSICAL EXAM:  GEN: NAD, resting comfortably in bed.   CV: RRR, no m/r/g  PULM: Nonlabored breathing, no respiratory distress. Breathing comfortably on RA.   ABD: Soft, NTND abdomen. Wound manager in place draining bilious fluid. Wound continues to heal appropriately, with granulation tissue overlying wound. LUQ JOYCE in place. R side ileostomy with red rubber in place.   EXTREM: WWP, no edema, SCDs in place  NEURO: AOX3.       I&O's Detail    08 Oct 2023 07:01  -  09 Oct 2023 07:00  --------------------------------------------------------  IN:    Fat Emulsion (Fish Oil &amp; Plant Based) 20% Infusion: 42 mL    Fat Emulsion (Fish Oil &amp; Plant Based) 20% Infusion: 625.4 mL    IV PiggyBack: 300 mL    TPN (Total Parenteral Nutrition): 2496 mL  Total IN: 3463.4 mL    OUT:    Bulb (mL): 20 mL    Drain (mL): 100 mL    Drain (mL): 1220 mL    Voided (mL): 2800 mL  Total OUT: 4140 mL    Total NET: -676.6 mL      09 Oct 2023 07:01  -  09 Oct 2023 14:28  --------------------------------------------------------  IN:    Oral Fluid: 570 mL    TPN (Total Parenteral Nutrition): 728 mL  Total IN: 1298 mL    OUT:    Bulb (mL): 0 mL    Drain (mL): 620 mL    Drain (mL): 0 mL  Total OUT: 620 mL    Total NET: 678 mL          LABS:                        8.5    6.81  )-----------( 171      ( 08 Oct 2023 05:26 )             26.9     10-08    135  |  102  |  30<H>  ----------------------------<  118<H>  3.5   |  24  |  1.08    Ca    8.3<L>      08 Oct 2023 05:26  Phos  3.4     10-08  Mg     2.0     10-08    TPro  7.8  /  Alb  2.0<L>  /  TBili  3.0<H>  /  DBili  2.0<H>  /  AST  63<H>  /  ALT  70<H>  /  AlkPhos  145<H>  10-08      Urinalysis Basic - ( 08 Oct 2023 05:26 )    Color: x / Appearance: x / SG: x / pH: x  Gluc: 118 mg/dL / Ketone: x  / Bili: x / Urobili: x   Blood: x / Protein: x / Nitrite: x   Leuk Esterase: x / RBC: x / WBC x   Sq Epi: x / Non Sq Epi: x / Bacteria: x        RADIOLOGY & ADDITIONAL STUDIES:

## 2023-10-09 NOTE — PROGRESS NOTE ADULT - SUBJECTIVE AND OBJECTIVE BOX
outputs well over 1L  AVSS  labs ok  wound myles    switch to ensure clear liquid and juice from water  cannot increase diet  ? tincture of opium  I&O  Wound care

## 2023-10-09 NOTE — PROGRESS NOTE ADULT - ASSESSMENT
78 M w/ Crohn's, AFib/Flutter s/p DCCVs on amiodarone, remote ileocectomy and open appendectomy. Admitted (6/23) for SBO vs Crohns flare, s/p NGT decompression and s/p lap TRE converted to open TRE, SBR x 3, left in discontinuity with abthera vac on (6/27), RTOR for ileocolic resection, small bowel anastomosis, and abdominal wall closure on (6/28), c/b fluid collection s/p IR aspiration of perihepatic fluid on (7/3), c/b wound dehiscence s/p RTOR exlap, washout, ileocolic resection with end ileostomy, blow hole colostomy, red rubber from ileostomy to small bowel anastomosis; vicryl bridging mesh on (7/5) transferred to SICU postoperatively for hemodynamic monitoring, with hospital course complicated by periods of AMS most recently on 9/1 and associated with oliguria, severe ELVI and hyponatremia, which resolved but now stepped back up for to SICU on 9/10 for acute AMS, intermittent hypoglycemia, AFib with RVR. Perc Choley tube placed on 9/11 for enlarged GB.     Recommendations:  - Continue to monitor strict I/Os  - Wound manager to be changed today   - C/w TPN  Team 4c will continue to follow, please call with any questions or concerns  Plan discussed with chief resident and attending surgeon, Dr. Knapp

## 2023-10-10 NOTE — CHART NOTE - NSCHARTNOTEFT_GEN_A_CORE
Admitting Diagnosis:   Patient is a 78y old  Male who presents with a chief complaint of SBO (10 Oct 2023 07:21)      PAST MEDICAL & SURGICAL HISTORY:  Essential hypertension  Hypertension      Atrial fibrillation  s/p cardioversion 2010 and 2014  Pt. reports 4 DCCV  Now on Amiodarone 200mg PO bid and Eliquis 5mg PO bid  Last DCCV 4 yrs ago at [facility]      Crohn's disease  s/p partial resection of ileum      Hyperlipidemia      Hypothyroidism      History of depression  On Venlafaxine ER 150mg PO bid      Junctional rhythm      H/O knee surgery      History of cataract surgery          Current Nutrition Order: TPN via PICC- provides 411g Dex, 144g AA, 100g Lipids; provides 2973.4 kcals, 144g protein, GIR 3.56 mg/kg/min      PO Intake: Good (%) [   ]  Fair (50-75%) [   ] Poor (<25%) [   ]    GI Issues:     Pain:    Skin Integrity:    Labs:   10-10    136  |  101  |  34<H>  ----------------------------<  126<H>  4.2   |  25  |  1.17    Ca    8.6      10 Oct 2023 05:14  Phos  4.7     10-10  Mg     2.1     10-10    TPro  8.0  /  Alb  2.1<L>  /  TBili  3.1<H>  /  DBili  x   /  AST  62<H>  /  ALT  68<H>  /  AlkPhos  147<H>  10-10    CAPILLARY BLOOD GLUCOSE          Medications:  MEDICATIONS  (STANDING):  amLODIPine   Tablet 10 milliGRAM(s) Oral daily  chlorhexidine 2% Cloths 1 Application(s) Topical <User Schedule>  diphenoxylate/atropine 2 Tablet(s) Oral every 6 hours  glucagon  Injectable 1 milliGRAM(s) IntraMuscular once  heparin   Injectable 5000 Unit(s) SubCutaneous every 8 hours  influenza  Vaccine (HIGH DOSE) 0.7 milliLiter(s) IntraMuscular once  levothyroxine Injectable 40 MICROGram(s) IV Push at bedtime  lipid, fat emulsion (Fish Oil and Plant Based) 20% Infusion 1.15 Gm/kG/Day (41.67 mL/Hr) IV Continuous <Continuous>  loperamide 4 milliGRAM(s) Oral every 6 hours  metoprolol tartrate Injectable 5 milliGRAM(s) IV Push every 6 hours  nystatin Powder 1 Application(s) Topical three times a day  opium Tincture 2 milliGRAM(s) Oral every 6 hours  Parenteral Nutrition - Adult 1 Each (104 mL/Hr) TPN Continuous <Continuous>  Parenteral Nutrition - Adult 1 Each (104 mL/Hr) TPN Continuous <Continuous>    MEDICATIONS  (PRN):  ondansetron Injectable 4 milliGRAM(s) IV Push every 8 hours PRN Nausea and/or Vomiting      Weight: 87.4kg [09 Oct 2023]  Daily 86.4kg [07 Oct 2023]  Daily 82.5kg [06 Oct 2023]  Daily 82.6kg [4 Oct 2023]   Daily 83.5kg [03 Oct 2023]  Daily 84.5kg [2 Oct 2023]  Daily 87.2kg [1 Oct 2023]  Daily 88.3kg [29 Sep 2023]  Daily 89.9kg [28 Sep 2023]  Daily 87.7kg [24 Sep 2023]  Daily 90.4kg [21 Sep 2023]  Daily 91.4kg [20 Sep 2023]  Daily 86.7kg [18 Sep 2023]  Daily 88.1kg [16 Sep 2023]  Daily 88.9kg [15 Sep 2023]   Daily 89.1 [14 Sep 2023]  Daily 92.9kg [6 Sep 2023]  Daily 91.8kg [27 Aug 2023]  Daily 101kg [9 Aug 2023]  Daily   102.1kg [10 July 2023]  Daily 99.9kg [9 July 2023]    Weight Change:  weight trending down throughout admission. Recommend continue weekly/daily weights for trending  & ensuring adequacy of nutrition provision    IBW: 86.4kg   % IBW: 101.2%    Estimated energy needs:   IBW [86.4kg] used for energy calculations as pt >100% IBW, BMI <30. Needs estimated for age and adjusted for current clinical status, increased needs for post-op & open abd wound healing    Calories: 7695-6371 kcals based on 30-40 kcals/kg  Protein: 129.6-172.8 g protein based on 1.5-2.0g protein/kg + additional depending on outputs  *Fluid needs per team    Subjective:   78M w/ Crohn's, AFib/Flutter s/p DCCVs on amiodarone, remote ileocectomy and open appy here for SBO vs Crohns flare, s/p NGT decompression and now s/p lap TRE converted to open TRE, SBR x 3, left in discontinuity with abthera vac on 6/27, RTOR for ileocolic resection, small bowel anastomosis, and abdominal wall closure on 6/28, and s/p IR aspiration of perihepatic fluid on 7/3, stepped down to telemetry on 7/4. wound dehiscence on 7/5, RTOR ex-lap, washout, ileocolic resection with end ileostomy, blow hole colostomy, red rubber from ileostomy to small bowel anastomosis; Vicryl bridging mesh on 7/5. Transferred to SICU post op for HD monitoring. Extubated 7/6 and SDU 8/2. Has been recovering on telemetry with ECF management and prolonged TPN. Upgraded to SICU 8/23 for acute delirium and tremors, r/o sepsis vs metabolic encephalopathy. Ammonia levels normal. TPN DC'ed and started on PO diet. Stepped down to 8 Lachman on 8/28.    Chart reviewed. Current TPN provision provides 35.9 kcals/kg, 29.0 non-protein kcals/kg, 1.74g protein/kg current wt    Previous Nutrition Diagnosis:  Increased Nutrient Needs R/T physiological demands for nutrient AEB post-op wound healing    Active [ X  ]  Resolved [   ]    If resolved, new PES:     Goal:  Pt will meet at least 75% of protein & energy needs via most appropriate route for nutrition     Recommendations:    Education: deferred    Risk Level: High [ X ] Moderate [   ] Low [   ] Admitting Diagnosis:   Patient is a 78y old  Male who presents with a chief complaint of SBO (10 Oct 2023 07:21)      PAST MEDICAL & SURGICAL HISTORY:  Essential hypertension  Hypertension      Atrial fibrillation  s/p cardioversion 2010 and 2014  Pt. reports 4 DCCV  Now on Amiodarone 200mg PO bid and Eliquis 5mg PO bid  Last DCCV 4 yrs ago at [facility]      Crohn's disease  s/p partial resection of ileum      Hyperlipidemia      Hypothyroidism      History of depression  On Venlafaxine ER 150mg PO bid      Junctional rhythm      H/O knee surgery      History of cataract surgery          Current Nutrition Order: Clear liquid diet + Ensure enlive TID [provides 350 kcals, 20g protein each]. TPN via PICC- provides 411g Dex, 144g AA, 100g Lipids; provides 2973.4 kcals, 144g protein, GIR 3.56 mg/kg/min      PO Intake: Good (%) [   ]  Fair (50-75%) [   ] Poor (<25%) [   ]    GI Issues:     Pain:    Skin Integrity: JOYCE drain x 2, mid abd wound, EC fistula, ileostomy, cony bag, L forearm skin tear. Rudy score 16    Labs:   10-10    136  |  101  |  34<H>  ----------------------------<  126<H>  4.2   |  25  |  1.17    Ca    8.6      10 Oct 2023 05:14  Phos  4.7     10-10  Mg     2.1     10-10    TPro  8.0  /  Alb  2.1<L>  /  TBili  3.1<H>  /  DBili  x   /  AST  62<H>  /  ALT  68<H>  /  AlkPhos  147<H>  10-10    CAPILLARY BLOOD GLUCOSE          Medications:  MEDICATIONS  (STANDING):  amLODIPine   Tablet 10 milliGRAM(s) Oral daily  chlorhexidine 2% Cloths 1 Application(s) Topical <User Schedule>  diphenoxylate/atropine 2 Tablet(s) Oral every 6 hours  glucagon  Injectable 1 milliGRAM(s) IntraMuscular once  heparin   Injectable 5000 Unit(s) SubCutaneous every 8 hours  influenza  Vaccine (HIGH DOSE) 0.7 milliLiter(s) IntraMuscular once  levothyroxine Injectable 40 MICROGram(s) IV Push at bedtime  lipid, fat emulsion (Fish Oil and Plant Based) 20% Infusion 1.15 Gm/kG/Day (41.67 mL/Hr) IV Continuous <Continuous>  loperamide 4 milliGRAM(s) Oral every 6 hours  metoprolol tartrate Injectable 5 milliGRAM(s) IV Push every 6 hours  nystatin Powder 1 Application(s) Topical three times a day  opium Tincture 2 milliGRAM(s) Oral every 6 hours  Parenteral Nutrition - Adult 1 Each (104 mL/Hr) TPN Continuous <Continuous>  Parenteral Nutrition - Adult 1 Each (104 mL/Hr) TPN Continuous <Continuous>    MEDICATIONS  (PRN):  ondansetron Injectable 4 milliGRAM(s) IV Push every 8 hours PRN Nausea and/or Vomiting      Weight: 87.4kg [09 Oct 2023]  Daily 86.4kg [07 Oct 2023]  Daily 82.5kg [06 Oct 2023]  Daily 82.6kg [4 Oct 2023]   Daily 83.5kg [03 Oct 2023]  Daily 84.5kg [2 Oct 2023]  Daily 87.2kg [1 Oct 2023]  Daily 88.3kg [29 Sep 2023]  Daily 89.9kg [28 Sep 2023]  Daily 87.7kg [24 Sep 2023]  Daily 90.4kg [21 Sep 2023]  Daily 91.4kg [20 Sep 2023]  Daily 86.7kg [18 Sep 2023]  Daily 88.1kg [16 Sep 2023]  Daily 88.9kg [15 Sep 2023]   Daily 89.1 [14 Sep 2023]  Daily 92.9kg [6 Sep 2023]  Daily 91.8kg [27 Aug 2023]  Daily 101kg [9 Aug 2023]  Daily   102.1kg [10 July 2023]  Daily 99.9kg [9 July 2023]    Weight Change:  weight trending down throughout admission. Recommend continue weekly/daily weights for trending  & ensuring adequacy of nutrition provision    IBW: 86.4kg   % IBW: 101.2%    Estimated energy needs:   IBW [86.4kg] used for energy calculations as pt >100% IBW, BMI <30. Needs estimated for age and adjusted for current clinical status, increased needs for post-op & open abd wound healing    Calories: 8511-1932 kcals based on 30-40 kcals/kg  Protein: 129.6-172.8 g protein based on 1.5-2.0g protein/kg + additional depending on outputs  *Fluid needs per team    Subjective:   78M w/ Crohn's, AFib/Flutter s/p DCCVs on amiodarone, remote ileocectomy and open appy here for SBO vs Crohns flare, s/p NGT decompression and now s/p lap TRE converted to open TRE, SBR x 3, left in discontinuity with abthera vac on 6/27, RTOR for ileocolic resection, small bowel anastomosis, and abdominal wall closure on 6/28, and s/p IR aspiration of perihepatic fluid on 7/3, stepped down to telemetry on 7/4. wound dehiscence on 7/5, RTOR ex-lap, washout, ileocolic resection with end ileostomy, blow hole colostomy, red rubber from ileostomy to small bowel anastomosis; Vicryl bridging mesh on 7/5. Transferred to SICU post op for HD monitoring. Extubated 7/6 and SDU 8/2. Has been recovering on telemetry with ECF management and prolonged TPN. Upgraded to SICU 8/23 for acute delirium and tremors, r/o sepsis vs metabolic encephalopathy. Ammonia levels normal. TPN DC'ed and started on PO diet. Stepped down to 8 Lachman on 8/28.    Chart reviewed. Current TPN provision provides 35.9 kcals/kg, 29.0 non-protein kcals/kg, 1.74g protein/kg current wt  Meds reviewed- on imodium, synthroid [administer 30-60 minutes before food; separate at least 4hrs from calcium or iron-containing products or bile acid sequestrants], opium tincture    Previous Nutrition Diagnosis:  Increased Nutrient Needs R/T physiological demands for nutrient AEB post-op wound healing    Active [ X  ]  Resolved [   ]    If resolved, new PES:     Goal:  Pt will meet at least 75% of protein & energy needs via most appropriate route for nutrition     Recommendations:    Education: deferred    Risk Level: High [ X ] Moderate [   ] Low [   ] Admitting Diagnosis:   Patient is a 78y old  Male who presents with a chief complaint of SBO (10 Oct 2023 07:21)      PAST MEDICAL & SURGICAL HISTORY:  Essential hypertension  Hypertension      Atrial fibrillation  s/p cardioversion 2010 and 2014  Pt. reports 4 DCCV  Now on Amiodarone 200mg PO bid and Eliquis 5mg PO bid  Last DCCV [facility] yrs ago at [facility]      Crohn's disease  s/p partial resection of ileum      Hyperlipidemia      Hypothyroidism      History of depression  On Venlafaxine ER 150mg PO bid      Junctional rhythm      H/O knee surgery      History of cataract surgery          Current Nutrition Order: Clear liquid diet + Ensure enlive TID [provides 350 kcals, 20g protein each]. TPN via PICC- provides 411g Dex, 144g AA, 100g Lipids; provides 2973.4 kcals, 144g protein, GIR 3.25 mg/kg/min      PO Intake: pt drinks most of ensures    GI Issues: nondistended, ileostomy, fistula & JOYCE drain, perc cony capped    Pain: no pain/discomfort noted    Skin Integrity: R heel DTI, L forearm skin tear. Rudy score 16    Labs:   10-10    136  |  101  |  34<H>  ----------------------------<  126<H>  4.2   |  25  |  1.17    Ca    8.6      10 Oct 2023 05:14  Phos  4.7     10-10  Mg     2.1     10-10    TPro  8.0  /  Alb  2.1<L>  /  TBili  3.1<H>  /  DBili  x   /  AST  62<H>  /  ALT  68<H>  /  AlkPhos  147<H>  10-10    CAPILLARY BLOOD GLUCOSE          Medications:  MEDICATIONS  (STANDING):  amLODIPine   Tablet 10 milliGRAM(s) Oral daily  chlorhexidine 2% Cloths 1 Application(s) Topical <User Schedule>  diphenoxylate/atropine 2 Tablet(s) Oral every 6 hours  glucagon  Injectable 1 milliGRAM(s) IntraMuscular once  heparin   Injectable 5000 Unit(s) SubCutaneous every 8 hours  influenza  Vaccine (HIGH DOSE) 0.7 milliLiter(s) IntraMuscular once  levothyroxine Injectable 40 MICROGram(s) IV Push at bedtime  lipid, fat emulsion (Fish Oil and Plant Based) 20% Infusion 1.15 Gm/kG/Day (41.67 mL/Hr) IV Continuous <Continuous>  loperamide 4 milliGRAM(s) Oral every 6 hours  metoprolol tartrate Injectable 5 milliGRAM(s) IV Push every 6 hours  nystatin Powder 1 Application(s) Topical three times a day  opium Tincture 2 milliGRAM(s) Oral every 6 hours  Parenteral Nutrition - Adult 1 Each (104 mL/Hr) TPN Continuous <Continuous>  Parenteral Nutrition - Adult 1 Each (104 mL/Hr) TPN Continuous <Continuous>    MEDICATIONS  (PRN):  ondansetron Injectable 4 milliGRAM(s) IV Push every 8 hours PRN Nausea and/or Vomiting      Weight: 87.7kg [11 Oct 2023]  Daily 87.4kg [09 Oct 2023]  Daily 86.4kg [07 Oct 2023]  Daily 82.5kg [06 Oct 2023]  Daily 82.6kg [4 Oct 2023]   Daily 83.5kg [03 Oct 2023]  Daily 84.5kg [2 Oct 2023]  Daily 87.2kg [1 Oct 2023]  Daily 88.3kg [29 Sep 2023]  Daily 89.9kg [28 Sep 2023]  Daily 87.7kg [24 Sep 2023]  Daily 90.4kg [21 Sep 2023]  Daily 91.4kg [20 Sep 2023]  Daily 86.7kg [18 Sep 2023]  Daily 88.1kg [16 Sep 2023]  Daily 88.9kg [15 Sep 2023]   Daily 89.1 [14 Sep 2023]  Daily 92.9kg [6 Sep 2023]  Daily 91.8kg [27 Aug 2023]  Daily 101kg [9 Aug 2023]  Daily   102.1kg [10 July 2023]  Daily 99.9kg [9 July 2023]    Weight Change:  weight trending down throughout admission, now appears trending up. Recommend continue weekly/daily weights for trending & ensuring adequacy of nutrition provision    IBW: 86.4kg   % IBW: 101.2%    Estimated energy needs:   IBW [86.4kg] used for energy calculations as pt >100% IBW, BMI <30. Needs estimated for age and adjusted for current clinical status, increased needs for post-op & open abd wound healing    Calories: 6134-1043 kcals based on 30-40 kcals/kg  Protein: 129.6-172.8 g protein based on 1.5-2.0g protein/kg + additional depending on outputs  *Fluid needs per team    Subjective:   78M w/ Crohn's, AFib/Flutter s/p DCCVs on amiodarone, remote ileocectomy and open appy here for SBO vs Crohns flare, s/p NGT decompression and now s/p lap TRE converted to open TRE, SBR x 3, left in discontinuity with abthera vac on 6/27, RTOR for ileocolic resection, small bowel anastomosis, and abdominal wall closure on 6/28, and s/p IR aspiration of perihepatic fluid on 7/3, stepped down to telemetry on 7/4. wound dehiscence on 7/5, RTOR ex-lap, washout, ileocolic resection with end ileostomy, blow hole colostomy, red rubber from ileostomy to small bowel anastomosis; Vicryl bridging mesh on 7/5. Transferred to SICU post op for HD monitoring. Extubated 7/6 and SDU 8/2. Has been recovering on telemetry with ECF management and prolonged TPN. Upgraded to SICU 8/23 for acute delirium and tremors, r/o sepsis vs metabolic encephalopathy. Ammonia levels normal. TPN DC'ed and started on PO diet. Stepped down to 8 Lachman on 8/28.    Chart reviewed. Current TPN provision provides 35.9 kcals/kg, 29.0 non-protein kcals/kg, 1.74g protein/kg current wt. PO diet of clear liquids initiated 10/9, now receiving PO diet of only Ensure Plus HP TID per surgical team [provides 350 kcals, 20g protein each]. Pt seen at bedside, denies n/v/abd pain. Increase in abd fistula output noted --> 1380ml output x 24hrs, output of 2325ml yesterday. Labs reviewed- noted copper 29 <L>, selenium 77 <L>, Na 134 <L>, BUN 33 <H>,  [10/10], ongoing elevated total bilirubin, alk phos, ALT & AST. Plan to cycle TPN over 18hrs to ease hepatic burden. Meds reviewed- on imodium, synthroid [administer 30-60 minutes before food; separate at least 4hrs from calcium or iron-containing products or bile acid sequestrants], opium tincture. See recommendations below. RDN will continue to monitor, reassess, and intervene as appropriate.     Previous Nutrition Diagnosis:  Increased Nutrient Needs R/T physiological demands for nutrient AEB post-op wound healing    Active [ X  ]  Resolved [   ]    If resolved, new PES:     Goal:  Pt will meet at least 75% of protein & energy needs via most appropriate route for nutrition     Recommendations:  1. c/w TPN via PICC as primary means to nutrition- 411g Dex, 144g AA, 100g Lipids; provides 2973.4 kcals, 144g protein, GIR 3.25 mg/kg/min [provides 34.4 kcals/kg, 1.67g protein/kg, 27.7 non-protein kcals/kg IBW]  - to provide TPN over 18hrs [6pm-12pm]  - MVI, selenium [now increased] in bag  - plan to add copper 3x week and recheck level on Monday  2. PO diet per team  - Ensure Plus HP provides 350 kcals, 20g protein each  - monitor I&Os, consider fecal fat study to assess absorption  3. Fluid & lytes per team  - sodium low today, BUN high  4. Weekly lipid panel  - hold/decrease lipids if TG >400  5. Monitor BMP/Mg/Phos, POC BG  - monitor & replenish lytes outside TPN bag prn  6. Continue close monitoring of clinical course & adjust recommendations prn  7. Monitor feasibility for advancing PO diet  *Discussed with team    Education: discussed current nutrition plan with patient    Risk Level: High [ X ] Moderate [   ] Low [   ]

## 2023-10-10 NOTE — DIETITIAN INITIAL EVALUATION ADULT - HEIGHT FOR BMI (FEET)
6 Cheek-To-Nose Interpolation Flap Text: A decision was made to reconstruct the defect utilizing an interpolation axial flap and a staged reconstruction.  A telfa template was made of the defect.  This telfa template was then used to outline the Cheek-To-Nose Interpolation flap.  The donor area for the pedicle flap was then injected with anesthesia.  The flap was excised through the skin and subcutaneous tissue down to the layer of the underlying musculature.  The interpolation flap was carefully excised within this deep plane to maintain its blood supply.  The edges of the donor site were undermined.   The donor site was closed in a primary fashion.  The pedicle was then rotated into position and sutured.  Once the tube was sutured into place, adequate blood supply was confirmed with blanching and refill.  The pedicle was then wrapped with xeroform gauze and dressed appropriately with a telfa and gauze bandage to ensure continued blood supply and protect the attached pedicle.

## 2023-10-10 NOTE — PROGRESS NOTE ADULT - SUBJECTIVE AND OBJECTIVE BOX
Interval Events:  No events overnight.     Patient seen and examined at bedside.      Allergies    penicillin (Angioedema)    Intolerances        Vital Signs Last 24 Hrs  T(C): 36.7 (10 Oct 2023 05:01), Max: 36.8 (09 Oct 2023 17:00)  T(F): 98.1 (10 Oct 2023 05:01), Max: 98.2 (09 Oct 2023 17:00)  HR: 89 (10 Oct 2023 05:00) (85 - 92)  BP: 117/59 (10 Oct 2023 05:00) (106/63 - 139/66)  BP(mean): 85 (10 Oct 2023 05:00) (75 - 95)  RR: 16 (10 Oct 2023 05:00) (13 - 28)  SpO2: 95% (10 Oct 2023 05:00) (95% - 100%)    Parameters below as of 10 Oct 2023 05:00  Patient On (Oxygen Delivery Method): room air        10-09 @ 07:01  -  10-10 @ 07:00  --------------------------------------------------------  IN: 4314.8 mL / OUT: 3598 mL / NET: 716.8 mL      10-09 @ 07:01  -  10-10 @ 07:00  --------------------------------------------------------  IN: 4314.8 mL / OUT: 3598 mL / NET: 716.8 mL        Physical Exam:     GENERAL: NAD, Awake alert   NEURO: A&O x3 No focal deficits good recall.   HEENT: NCAT, MMM  C/V: Normal rate, normal peripheral perfusion, no murmurs  PULM: Nonlabored breathing, no respiratory distress, CTA bl  ABD: Soft, ND, NT, no rebound tenderness, no guarding, wound manager in place draining bilious fluid  EXTREM: WWP, no edema, SCDs in place  Vasc: + DP b/l   Skin: no rashes noted  MSK: No joint swelling  Psych: No signs of anxiety or depression        LABS:      CBC Full  -  ( 10 Oct 2023 05:14 )  WBC Count : 6.91 K/uL  RBC Count : 2.83 M/uL  Hemoglobin : 8.4 g/dL  Hematocrit : 26.6 %  Platelet Count - Automated : 157 K/uL  Mean Cell Volume : 94.0 fl  Mean Cell Hemoglobin : 29.7 pg  Mean Cell Hemoglobin Concentration : 31.6 gm/dL  Auto Neutrophil # : 4.69 K/uL  Auto Lymphocyte # : 1.04 K/uL  Auto Monocyte # : 0.67 K/uL  Auto Eosinophil # : 0.42 K/uL  Auto Basophil # : 0.05 K/uL  Auto Neutrophil % : 67.8 %  Auto Lymphocyte % : 15.1 %  Auto Monocyte % : 9.7 %  Auto Eosinophil % : 6.1 %  Auto Basophil % : 0.7 %    10-10    136  |  101  |  34<H>  ----------------------------<  126<H>  4.2   |  25  |  1.17    Ca    8.6      10 Oct 2023 05:14  Phos  4.7     10-10  Mg     2.1     10-10    TPro  8.0  /  Alb  2.1<L>  /  TBili  3.1<H>  /  DBili  x   /  AST  62<H>  /  ALT  68<H>  /  AlkPhos  147<H>  10-10          Urinalysis Basic - ( 10 Oct 2023 05:14 )    Color: x / Appearance: x / SG: x / pH: x  Gluc: 126 mg/dL / Ketone: x  / Bili: x / Urobili: x   Blood: x / Protein: x / Nitrite: x   Leuk Esterase: x / RBC: x / WBC x   Sq Epi: x / Non Sq Epi: x / Bacteria: x              RADIOLOGY & ADDITIONAL STUDIES (The following images were personally reviewed):          A/p:  A/p: 78 M w/ Crohn's, AFib/Flutter s/p DCCVs on amiodarone, remote ileocectomy and open appendectomy. Admitted (6/23) for SBO vs Crohns flare, s/p NGT decompression and s/p lap TRE converted to open TRE, SBR x 3, left in discontinuity with abthera vac on (6/27), RTOR for ileocolic resection, small bowel anastomosis, and abdominal wall closure on (6/28), c/b fluid collection s/p IR aspiration of perihepatic fluid on (7/3), c/b wound dehiscence s/p RTOR exlap, washout, ileocolic resection with end ileostomy, blow hole colostomy, red rubber from ileostomy to small bowel anastomosis; vicryl bridging mesh on (7/5) transferred to SICU postoperatively for hemodynamic monitoring, with hospital course complicated by periods of AMS most recently on 9/1 and associated with oliguria, severe ELVI and hyponatremia, which resolved but now stepped back up for to SICU on 9/10 for acute AMS, intermittent hypoglycemia, AFib with RVR. Perc Choley tube placed on 9/11 for enlarged GB.     NEURO: AMS (resolved), EEG neg. Hx of depression - Effexor Dc'd on 9/10 due to delirium Pain control with Dilaudid for vac changes only. Cont Melatonin prn Insomnia, Cont Zofran   HENT: Cepacol  CV: Hx Afib/flutter: s/p DCCV, Off amiodarone given transaminitis; Continue Metoprolol 5q6. Hx HLD: hold Lipitor given LFTs. TTE  (7/18) - PASP 64mmHg, EF 65-70%,  Hx HTN - improved on Norvasc 10qd. Holding Losartan  Cont Lopressor 5 q6. Poss Change to PO lopressor  PULM: no resp distress on room air.   GI/FEN: Cont CLD with ensure enlive.  Cont TPN for high ostomy and ECF output: Cont Octreotide in TPN. cont Tincture of opium 2mg q6, Imodium, and Lomotil. PICC/TPN (Lipids 100gm, Dex@27/kg, AA @1.6). Transaminitis of unknown origin, now stable. s/p Perc Choley on 9/11. PPI.   : Voids, S/p ELVI resolved.  ENDO: No need to check ISS. Hx hypothyroid: IV Synthroid, TSH wnl on 9/10  ID: Numerous SICU admissions for sepsis. Currently off abx. Cont Nystatin powder // PREVIOUSLY had C. tertium, Lactobacillis from his IR cx 7/3, and candida albicans, lactobacillus, vanc sensitive E faecium, vanc resistant E gallinarum, vanc resistant E casseliflavus, lactobacillus from his OR cx 7/5. Completed course of abx with Imipenem (9/10-9/15). Imipenem (8/26--) imipenem (6/30-7/12, 7/23-7/30), Dapto (6/30-7/5 and 7/23-7/24).  empiric dapto (8/23-24) and cefepime (8/23-24).   PPX: SCDs, SQH  LINES: PIVs, LUE PICC (9/1- )  WOUNDS/DRAINS: Abdominal wound and fistula with wound manager in place dressing change M &F . Rt LQ Ostomy. JOYCE.   PT: Ambulate as tolerated   DISPO: SICU, q4 vitals

## 2023-10-10 NOTE — PROGRESS NOTE ADULT - ASSESSMENT
78 M w/ Crohn's, AFib/Flutter s/p DCCVs on amiodarone, remote ileocectomy and open appendectomy. Admitted (6/23) for SBO vs Crohns flare, s/p NGT decompression and s/p lap TRE converted to open TRE, SBR x 3, left in discontinuity with abthera vac on (6/27), RTOR for ileocolic resection, small bowel anastomosis, and abdominal wall closure on (6/28), c/b fluid collection s/p IR aspiration of perihepatic fluid on (7/3), c/b wound dehiscence s/p RTOR exlap, washout, ileocolic resection with end ileostomy, blow hole colostomy, red rubber from ileostomy to small bowel anastomosis; vicryl bridging mesh on (7/5) transferred to SICU postoperatively for hemodynamic monitoring, with hospital course complicated by periods of AMS most recently on 9/1 and associated with oliguria, severe ELVI and hyponatremia, which resolved but now stepped back up for to SICU on 9/10 for acute AMS, intermittent hypoglycemia, AFib with RVR. Perc Choley tube placed on 9/11 for enlarged GB.     Recommendations:  - Continue to monitor strict I/Os  - C/w TPN  Team 4c will continue to follow, please call with any questions or concerns  Plan discussed with chief resident and attending surgeon, Dr. Knapp

## 2023-10-10 NOTE — PROGRESS NOTE ADULT - NS ATTEND AMEND GEN_ALL_CORE FT
78 year old male with hx crohn's, atrial fibrillation s/p SBR for SBO with ileocolic resection and ileostomy, on TPN.  perc cony tube capped. Some increased gut output after trialling a lot of PO water and ensure clears. Trialling a small volume of ensure with tincture of opium today. Close monitoring of output.

## 2023-10-10 NOTE — PROGRESS NOTE ADULT - SUBJECTIVE AND OBJECTIVE BOX
SUBJECTIVE: Patient seen and examined at bedside with chief resident. Patient states he is generally feeling well and is tolerating his ensures without nausea or vomiting. He states that his pain is well controlled, he denies chest pain and shortness of breath.       amLODIPine   Tablet 10 milliGRAM(s) Oral daily  heparin   Injectable 5000 Unit(s) SubCutaneous every 8 hours  metoprolol tartrate Injectable 5 milliGRAM(s) IV Push every 6 hours      Vital Signs Last 24 Hrs  T(C): 36.7 (10 Oct 2023 05:01), Max: 36.8 (09 Oct 2023 17:00)  T(F): 98.1 (10 Oct 2023 05:01), Max: 98.2 (09 Oct 2023 17:00)  HR: 89 (10 Oct 2023 05:00) (85 - 92)  BP: 117/59 (10 Oct 2023 05:00) (106/63 - 139/66)  BP(mean): 85 (10 Oct 2023 05:00) (75 - 95)  RR: 16 (10 Oct 2023 05:00) (13 - 28)  SpO2: 95% (10 Oct 2023 05:00) (95% - 100%)    Parameters below as of 10 Oct 2023 05:00  Patient On (Oxygen Delivery Method): room air      I&O's Detail    09 Oct 2023 07:01  -  10 Oct 2023 07:00  --------------------------------------------------------  IN:    Fat Emulsion (Fish Oil &amp; Plant Based) 20% Infusion: 540.8 mL    Oral Fluid: 1590 mL    TPN (Total Parenteral Nutrition): 2288 mL  Total IN: 4418.8 mL    OUT:    Bulb (mL): 8 mL    Drain (mL): 2325 mL    Drain (mL): 65 mL    Voided (mL): 1475 mL  Total OUT: 3873 mL    Total NET: 545.8 mL      10 Oct 2023 07:01  -  10 Oct 2023 07:49  --------------------------------------------------------  IN:    TPN (Total Parenteral Nutrition): 104 mL  Total IN: 104 mL    OUT:    Fat Emulsion (Fish Oil &amp; Plant Based) 20% Infusion: 0 mL  Total OUT: 0 mL    Total NET: 104 mL          General: NAD, resting comfortably in bed  C/V: NSR  Pulm: Nonlabored breathing, no respiratory distress  Abd: soft, NT/ND. wound manager in place with bilious fluid. LUQ JOYCE in place, RLQ ileostomy  Extrem: WWP, no edema, SCDs in place        LABS:                        8.4    6.91  )-----------( 157      ( 10 Oct 2023 05:14 )             26.6     10-10    136  |  101  |  34<H>  ----------------------------<  126<H>  4.2   |  25  |  1.17    Ca    8.6      10 Oct 2023 05:14  Phos  4.7     10-10  Mg     2.1     10-10    TPro  8.0  /  Alb  2.1<L>  /  TBili  3.1<H>  /  DBili  x   /  AST  62<H>  /  ALT  68<H>  /  AlkPhos  147<H>  10-10      Urinalysis Basic - ( 10 Oct 2023 05:14 )    Color: x / Appearance: x / SG: x / pH: x  Gluc: 126 mg/dL / Ketone: x  / Bili: x / Urobili: x   Blood: x / Protein: x / Nitrite: x   Leuk Esterase: x / RBC: x / WBC x   Sq Epi: x / Non Sq Epi: x / Bacteria: x

## 2023-10-10 NOTE — ADVANCED PRACTICE NURSE CONSULT - ASSESSMENT
10.5 x 7cm with 100% red granulation tissue with new epithelium to edges. Thin biofilm noted today to approximately 40% of the wound bed.  Stomatized fistula at 2 o'clock with yellow effluent. Periwound skin is intact without irritation. RLQ ileostomy stoma retracted, small less than 1 inch ovoid, peristomal skin intact. Stoma intubated with red rubber catheter.  Ramesh TaxJar's wound manager #[id number] applied to abdominal wound in the following manner: Skin thoroughly cleansed then Vashe soak applied to wound bed for approx 5 minutes.  Cavilon No Sting Barrier film applied to periwound, section of VAC isolator along edge of fistula, Adapt ostomy rings, stoma paste applied around wound and within creases. 2 piece 1 3/4" pouch with Adapt ring applied to RLQ Ileostomy. Site measures approx 10.5 x 7 cm with epitheliazation at edges, still with slight biofilm noted in wound bed, hypergranulation.

## 2023-10-10 NOTE — PROGRESS NOTE ADULT - ASSESSMENT
78 M w/ Crohn's, AFib/Flutter s/p DCCVs on amiodarone, remote ileocectomy and open appendectomy. Admitted (6/23) for SBO vs Crohns flare, s/p NGT decompression and s/p lap TRE converted to open TRE, SBR x 3, left in discontinuity with abthera vac on (6/27), RTOR for ileocolic resection, small bowel anastomosis, and abdominal wall closure on (6/28), c/b fluid collection s/p IR aspiration of perihepatic fluid on (7/3), c/b wound dehiscence s/p RTOR exlap, washout, ileocolic resection with end ileostomy, blow hole colostomy, red rubber from ileostomy to small bowel anastomosis; vicryl bridging mesh on (7/5) transferred to SICU postoperatively for hemodynamic monitoring, with hospital course complicated by periods of AMS most recently on 9/1 and associated with oliguria, severe ELVI and hyponatremia, which resolved but now stepped back up for to SICU on 9/10 for acute AMS, intermittent hypoglycemia, AFib with RVR. Perc Choley tube placed on 9/11 for enlarged GB.    Continue TPN  Max 3 ensures/day  Encourage OOB/ambulation  Continue IV medications  Advance current regimen: lomotil, imodium, opium tincture  Continue wound care evaluation   Rest of care per SICU team    Team 5c will continue to follow

## 2023-10-10 NOTE — PROGRESS NOTE ADULT - SUBJECTIVE AND OBJECTIVE BOX
INTERVAL HPI/OVERNIGHT EVENTS:  ON: Cr stable O/N, BUN slightly uptrending. High output ECF.     STATUS POST:    6/27: Laparoscopic lysis of adhesions, converted to open lysis of adhesions, SBR x 3, temporary abdominal closure, 5L cryst, 1L 5% alb, 3 pRBC, 1 FFP, 1 plts  6/28: ex lap, removal of abthera, ileocolic resection, small bowel anastamosis, , 1.6 crystalloid, 250 5%, 175 uop   7/3: IR Gendel drained perihepatic aspiration of serous fluid.   7/5: RTOR exlap, washout, ileocolic resection with end ileostomy, blow hole colostomy, fistula, red rubber from ileostomy to small bowel anastomosis; vicryl bridging mesh; R JOYCE below ileostomy, L JOYCE at small bowel enterotomy repair; 500 LR, 500 5% albumin, 3u PRBC, 2 FFP, 400 UOP,   9/11: s/p perc cony    SUBJECTIVE:  Patient seen and examined by chief resident and team on AM rounds. Patient appears well with no acute complaints. Patient reports he is concerned regarding increased fistula output after advancing diet to CLD yesterday. Fistula w/ 300 cc for night/2000cc per 24 hrs. Patient remains on lomotil, imodium, octreotide, and tincture of opium.     MEDICATIONS  (STANDING):  amLODIPine   Tablet 10 milliGRAM(s) Oral daily  chlorhexidine 2% Cloths 1 Application(s) Topical <User Schedule>  diphenoxylate/atropine 2 Tablet(s) Oral every 6 hours  glucagon  Injectable 1 milliGRAM(s) IntraMuscular once  heparin   Injectable 5000 Unit(s) SubCutaneous every 8 hours  influenza  Vaccine (HIGH DOSE) 0.7 milliLiter(s) IntraMuscular once  levothyroxine Injectable 40 MICROGram(s) IV Push at bedtime  lipid, fat emulsion (Fish Oil and Plant Based) 20% Infusion 1.15 Gm/kG/Day (41.67 mL/Hr) IV Continuous <Continuous>  loperamide 4 milliGRAM(s) Oral every 6 hours  metoprolol tartrate Injectable 5 milliGRAM(s) IV Push every 6 hours  nystatin Powder 1 Application(s) Topical three times a day  opium Tincture 2 milliGRAM(s) Oral every 6 hours  Parenteral Nutrition - Adult 1 Each (104 mL/Hr) TPN Continuous <Continuous>  Parenteral Nutrition - Adult 1 Each (104 mL/Hr) TPN Continuous <Continuous>    MEDICATIONS  (PRN):  ondansetron Injectable 4 milliGRAM(s) IV Push every 8 hours PRN Nausea and/or Vomiting      Vital Signs Last 24 Hrs  T(C): 36.8 (10 Oct 2023 17:48), Max: 36.8 (10 Oct 2023 17:48)  T(F): 98.2 (10 Oct 2023 17:48), Max: 98.2 (10 Oct 2023 17:48)  HR: 89 (10 Oct 2023 14:36) (85 - 89)  BP: 136/63 (10 Oct 2023 14:36) (109/52 - 136/63)  BP(mean): 91 (10 Oct 2023 14:36) (75 - 91)  RR: 21 (10 Oct 2023 14:36) (13 - 31)  SpO2: 100% (10 Oct 2023 14:36) (95% - 100%)    Parameters below as of 10 Oct 2023 14:36  Patient On (Oxygen Delivery Method): room air        PHYSICAL EXAM:  GEN: NAD, resting comfortably in bed.   CV: RRR, no m/r/g  PULM: Nonlabored breathing, no respiratory distress. Breathing comfortably on RA.   ABD: Soft, NTND abdomen. Wound manager in place draining bilious fluid. Wound continues to heal appropriately, with granulation tissue overlying wound. LUQ JOYCE in place, with scant old bilious contents. R side ileostomy with red rubber in place.   EXTREM: WWP, no edema, SCDs in place  NEURO: AOX3.         I&O's Detail    09 Oct 2023 07:01  -  10 Oct 2023 07:00  --------------------------------------------------------  IN:    Fat Emulsion (Fish Oil &amp; Plant Based) 20% Infusion: 540.8 mL    Oral Fluid: 1590 mL    TPN (Total Parenteral Nutrition): 2288 mL  Total IN: 4418.8 mL    OUT:    Bulb (mL): 8 mL    Drain (mL): 2325 mL    Drain (mL): 65 mL    Voided (mL): 1475 mL  Total OUT: 3873 mL    Total NET: 545.8 mL      10 Oct 2023 07:01  -  10 Oct 2023 18:21  --------------------------------------------------------  IN:    Oral Fluid: 375 mL    TPN (Total Parenteral Nutrition): 1040 mL  Total IN: 1415 mL    OUT:    Drain (mL): 30 mL    Drain (mL): 455 mL    Fat Emulsion (Fish Oil &amp; Plant Based) 20% Infusion: 0 mL    Voided (mL): 1025 mL  Total OUT: 1510 mL    Total NET: -95 mL          LABS:                        8.4    6.91  )-----------( 157      ( 10 Oct 2023 05:14 )             26.6     10-10    136  |  101  |  34<H>  ----------------------------<  126<H>  4.2   |  25  |  1.17    Ca    8.6      10 Oct 2023 05:14  Phos  4.7     10-10  Mg     2.1     10-10    TPro  8.0  /  Alb  2.1<L>  /  TBili  3.1<H>  /  DBili  x   /  AST  62<H>  /  ALT  68<H>  /  AlkPhos  147<H>  10-10      Urinalysis Basic - ( 10 Oct 2023 05:14 )    Color: x / Appearance: x / SG: x / pH: x  Gluc: 126 mg/dL / Ketone: x  / Bili: x / Urobili: x   Blood: x / Protein: x / Nitrite: x   Leuk Esterase: x / RBC: x / WBC x   Sq Epi: x / Non Sq Epi: x / Bacteria: x        RADIOLOGY & ADDITIONAL STUDIES:

## 2023-10-11 NOTE — PROGRESS NOTE ADULT - SUBJECTIVE AND OBJECTIVE BOX
Interval Events:  no events overnight   Patient seen and examined at bedside.      Allergies    penicillin (Angioedema)    Intolerances        Vital Signs Last 24 Hrs  T(C): 36.9 (11 Oct 2023 01:08), Max: 37.2 (10 Oct 2023 22:19)  T(F): 98.4 (11 Oct 2023 01:08), Max: 98.9 (10 Oct 2023 22:19)  HR: 91 (11 Oct 2023 05:00) (88 - 91)  BP: 116/56 (11 Oct 2023 05:00) (116/56 - 150/61)  BP(mean): 80 (11 Oct 2023 05:00) (80 - 91)  RR: 18 (11 Oct 2023 05:00) (13 - 31)  SpO2: 97% (11 Oct 2023 05:00) (96% - 100%)    Parameters below as of 11 Oct 2023 05:00  Patient On (Oxygen Delivery Method): room air        10-10 @ 07:01  -  10-11 @ 07:00  --------------------------------------------------------  IN: 3817.4 mL / OUT: 3535 mL / NET: 282.4 mL      10-10 @ 07:01  -  10-11 @ 07:00  --------------------------------------------------------  IN: 3817.4 mL / OUT: 3535 mL / NET: 282.4 mL        Physical Exam:     GENERAL: NAD, Awake alert   NEURO: A&O x3 No focal deficits good recall.   HEENT: NCAT, MMM  C/V: Normal rate, normal peripheral perfusion, no murmurs  PULM: Nonlabored breathing, no respiratory distress, CTA bl  ABD: Soft, ND, NT, no rebound tenderness, no guarding, wound manager in place draining bilious fluid  EXTREM: WWP, no edema, SCDs in place  Vasc: + DP b/l   Skin: no rashes noted  MSK: No joint swelling  Psych: No signs of anxiety or depression      LABS:      CBC Full  -  ( 10 Oct 2023 05:14 )  WBC Count : 6.91 K/uL  RBC Count : 2.83 M/uL  Hemoglobin : 8.4 g/dL  Hematocrit : 26.6 %  Platelet Count - Automated : 157 K/uL  Mean Cell Volume : 94.0 fl  Mean Cell Hemoglobin : 29.7 pg  Mean Cell Hemoglobin Concentration : 31.6 gm/dL  Auto Neutrophil # : 4.69 K/uL  Auto Lymphocyte # : 1.04 K/uL  Auto Monocyte # : 0.67 K/uL  Auto Eosinophil # : 0.42 K/uL  Auto Basophil # : 0.05 K/uL  Auto Neutrophil % : 67.8 %  Auto Lymphocyte % : 15.1 %  Auto Monocyte % : 9.7 %  Auto Eosinophil % : 6.1 %  Auto Basophil % : 0.7 %    10-11    134<L>  |  101  |  33<H>  ----------------------------<  118<H>  4.6   |  26  |  1.18    Ca    8.8      11 Oct 2023 04:53  Phos  3.6     10-11  Mg     2.1     10-10    TPro  8.0  /  Alb  2.4<L>  /  TBili  3.1<H>  /  DBili  x   /  AST  65<H>  /  ALT  66<H>  /  AlkPhos  150<H>  10-11          Urinalysis Basic - ( 11 Oct 2023 04:53 )    Color: x / Appearance: x / SG: x / pH: x  Gluc: 118 mg/dL / Ketone: x  / Bili: x / Urobili: x   Blood: x / Protein: x / Nitrite: x   Leuk Esterase: x / RBC: x / WBC x   Sq Epi: x / Non Sq Epi: x / Bacteria: x              RADIOLOGY & ADDITIONAL STUDIES (The following images were personally reviewed):           A/p: 78 M w/ Crohn's, AFib/Flutter s/p DCCVs on amiodarone, remote ileocectomy and open appendectomy. Admitted (6/23) for SBO vs Crohns flare, s/p NGT decompression and s/p lap TRE converted to open TRE, SBR x 3, left in discontinuity with abthera vac on (6/27), RTOR for ileocolic resection, small bowel anastomosis, and abdominal wall closure on (6/28), c/b fluid collection s/p IR aspiration of perihepatic fluid on (7/3), c/b wound dehiscence s/p RTOR exlap, washout, ileocolic resection with end ileostomy, blow hole colostomy, red rubber from ileostomy to small bowel anastomosis; vicryl bridging mesh on (7/5) transferred to SICU postoperatively for hemodynamic monitoring, with hospital course complicated by periods of AMS most recently on 9/1 and associated with oliguria, severe ELVI and hyponatremia, which resolved but now stepped back up for to SICU on 9/10 for acute AMS, intermittent hypoglycemia, AFib with RVR. Perc Choley tube placed on 9/11 for enlarged GB.     NEURO: AMS (resolved), EEG neg. Hx of depression - Effexor Dc'd on 9/10 due to delirium Pain control with Dilaudid for vac changes only. Cont Melatonin prn Insomnia, Cont Zofran   HENT: Cepacol  CV: Hx Afib/flutter: s/p DCCV, Off amiodarone given transaminitis; Continue Metoprolol 5q6. Hx HLD: Lipitor Held given LFTs. TTE  (7/18) - PASP 64mmHg, EF 65-70%,  Hx HTN - improved on Norvasc 10qd. Holding Losartan  Cont Lopressor 5 q6. Poss Change to PO lopressor  PULM: no resp distress on room air.   GI/FEN: Cont CLD with ensure enlive.  Cont TPN for high ostomy and ECF output: Cont Octreotide in TPN. cont Tincture of opium 2mg q6, Imodium, and Lomotil. PICC/TPN (Lipids 100gm, Dex@27/kg, AA @1.6) copper added as level low will add twice a week will check level again on Monday will cycle TPN over 18hrsat night. Transaminitis of unknown origin, now stable. s/p Perc Choley on 9/11. PPI.   : Voids, S/p ELVI resolved.  ENDO: No need to check ISS. Hx hypothyroid: IV Synthroid, TSH wnl on 9/10  ID: Numerous SICU admissions for sepsis. Currently off abx. Cont Nystatin powder // PREVIOUSLY had C. tertium, Lactobacillis from his IR cx 7/3, and candida albicans, lactobacillus, vanc sensitive E faecium, vanc resistant E gallinarum, vanc resistant E casseliflavus, lactobacillus from his OR cx 7/5. Completed course of abx with Imipenem (9/10-9/15). Imipenem (8/26--) imipenem (6/30-7/12, 7/23-7/30), Dapto (6/30-7/5 and 7/23-7/24).  empiric dapto (8/23-24) and cefepime (8/23-24).   PPX: SCDs, SQH  LINES: PIVs, LUE PICC (9/1- )  WOUNDS/DRAINS: Abdominal wound and fistula with wound manager in place dressing change M &F . Rt LQ Ostomy. JOYCE.   PT: Ambulate as tolerated   DISPO: SICU, q4 vitals during day and no vitals overnight to allow pt to sleep.

## 2023-10-11 NOTE — PROGRESS NOTE ADULT - ASSESSMENT
78 M w/ Crohn's, AFib/Flutter s/p DCCVs on amiodarone, remote ileocectomy and open appendectomy. Admitted (6/23) for SBO vs Crohns flare, s/p NGT decompression and s/p lap TRE converted to open TRE, SBR x 3, left in discontinuity with abthera vac on (6/27), RTOR for ileocolic resection, small bowel anastomosis, and abdominal wall closure on (6/28), c/b fluid collection s/p IR aspiration of perihepatic fluid on (7/3), c/b wound dehiscence s/p RTOR exlap, washout, ileocolic resection with end ileostomy, blow hole colostomy, red rubber from ileostomy to small bowel anastomosis; vicryl bridging mesh on (7/5) transferred to SICU postoperatively for hemodynamic monitoring, with hospital course complicated by periods of AMS most recently on 9/1 and associated with oliguria, severe ELVI and hyponatremia, which resolved but now stepped back up for to SICU on 9/10 for acute AMS, intermittent hypoglycemia, AFib with RVR. Perc Choley tube placed on 9/11 for enlarged GB.    Continue TPN  Continue max 3 ensures/day  Encourage OOB/ambulation  Continue IV medications  Continue current regimen: octreotide, lomotil, imodium, opium tincture  Continue wound care evaluation   Rest of care per SICU team    Team 5c will continue to follow

## 2023-10-11 NOTE — PROGRESS NOTE ADULT - SUBJECTIVE AND OBJECTIVE BOX
STATUS POST:    6/27: Laparoscopic lysis of adhesions, converted to open lysis of adhesions, SBR x 3, temporary abdominal closure, 5L cryst, 1L 5% alb, 3 pRBC, 1 FFP, 1 plts  6/28: ex lap, removal of abthera, ileocolic resection, small bowel anastamosis, , 1.6 crystalloid, 250 5%, 175 uop   7/3: IR Gendel drained perihepatic aspiration of serous fluid.   7/5: RTOR exlap, washout, ileocolic resection with end ileostomy, blow hole colostomy, fistula, red rubber from ileostomy to small bowel anastomosis; vicryl bridging mesh; R JOYCE below ileostomy, L JOYCE at small bowel enterotomy repair; 500 LR, 500 5% albumin, 3u PRBC, 2 FFP, 400 UOP,   9/11: s/p perc cony    SUBJECTIVE: Patient seen and examined at bedside with chief resident. Patient sound resting comfortably in bed with no acute complaints. Patient with 1380cc bilious output from fistula, decreased from 2000cc previous on just 3 ensures daily. Patient remains on mac regimen imodium, octreotide, lomotil, and opium tincture.      amLODIPine   Tablet 10 milliGRAM(s) Oral daily  heparin   Injectable 5000 Unit(s) SubCutaneous every 8 hours  metoprolol tartrate Injectable 5 milliGRAM(s) IV Push every 6 hours      Vital Signs Last 24 Hrs  T(C): 36.8 (11 Oct 2023 06:06), Max: 37.2 (10 Oct 2023 22:19)  T(F): 98.2 (11 Oct 2023 06:06), Max: 98.9 (10 Oct 2023 22:19)  HR: 88 (11 Oct 2023 09:00) (88 - 91)  BP: 135/63 (11 Oct 2023 09:00) (116/56 - 150/61)  BP(mean): 90 (11 Oct 2023 09:00) (80 - 91)  RR: 18 (11 Oct 2023 09:00) (13 - 31)  SpO2: 96% (11 Oct 2023 09:00) (96% - 100%)    Parameters below as of 11 Oct 2023 09:00  Patient On (Oxygen Delivery Method): room air      I&O's Detail    10 Oct 2023 07:01  -  11 Oct 2023 07:00  --------------------------------------------------------  IN:    Fat Emulsion (Fish Oil &amp; Plant Based) 20% Infusion: 500.4 mL    Oral Fluid: 1025 mL    TPN (Total Parenteral Nutrition): 2392 mL  Total IN: 3917.4 mL    OUT:    Bulb (mL): 5 mL    Drain (mL): 70 mL    Drain (mL): 1380 mL    Fat Emulsion (Fish Oil &amp; Plant Based) 20% Infusion: 0 mL    Voided (mL): 2180 mL  Total OUT: 3635 mL    Total NET: 282.4 mL      11 Oct 2023 07:01  -  11 Oct 2023 11:39  --------------------------------------------------------  IN:    TPN (Total Parenteral Nutrition): 416 mL  Total IN: 416 mL    OUT:    Drain (mL): 200 mL    Voided (mL): 550 mL  Total OUT: 750 mL    Total NET: -334 mL          General: NAD, resting comfortably in bed  C/V: NSR  Pulm: Nonlabored breathing, no respiratory distress  Abd: soft, NT/ND. wound manager in place with bilious fluid. LUQ JOYCE in place with minimal output, RLQ ileostomy  Extrem: WWP, no edema, SCDs in place      LABS:                        8.4    6.91  )-----------( 157      ( 10 Oct 2023 05:14 )             26.6     10-11    134<L>  |  101  |  33<H>  ----------------------------<  118<H>  4.6   |  26  |  1.18    Ca    8.8      11 Oct 2023 04:53  Phos  3.6     10-11  Mg     2.1     10-10    TPro  8.0  /  Alb  2.4<L>  /  TBili  3.1<H>  /  DBili  x   /  AST  65<H>  /  ALT  66<H>  /  AlkPhos  150<H>  10-11      Urinalysis Basic - ( 11 Oct 2023 04:53 )    Color: x / Appearance: x / SG: x / pH: x  Gluc: 118 mg/dL / Ketone: x  / Bili: x / Urobili: x   Blood: x / Protein: x / Nitrite: x   Leuk Esterase: x / RBC: x / WBC x   Sq Epi: x / Non Sq Epi: x / Bacteria: x        RADIOLOGY & ADDITIONAL STUDIES:

## 2023-10-11 NOTE — PROGRESS NOTE ADULT - NS ATTEND AMEND GEN_ALL_CORE FT
78 year old male with hx crohn's, atrial fibrillation s/p SBR for SBO with ileocolic resection and ileostomy, on TPN.  perc cony tube capped. Some increased gut output after trialling a lot of PO water and ensure clears. Trialling  small volume of ensure with tincture of opium. Close monitoring of output. Hold vitals during sleeping hours to help maintain day-night sleep wake cycle.

## 2023-10-11 NOTE — PROGRESS NOTE ADULT - SUBJECTIVE AND OBJECTIVE BOX
INTERVAL HPI/OVERNIGHT EVENTS:  AMRITA overnight.     STATUS POST:    6/27: Laparoscopic lysis of adhesions, converted to open lysis of adhesions, SBR x 3, temporary abdominal closure, 5L cryst, 1L 5% alb, 3 pRBC, 1 FFP, 1 plts  6/28: ex lap, removal of abthera, ileocolic resection, small bowel anastamosis, , 1.6 crystalloid, 250 5%, 175 uop   7/3: IR Gendel drained perihepatic aspiration of serous fluid.   7/5: RTOR exlap, washout, ileocolic resection with end ileostomy, blow hole colostomy, fistula, red rubber from ileostomy to small bowel anastomosis; vicryl bridging mesh; R JOYCE below ileostomy, L JOYCE at small bowel enterotomy repair; 500 LR, 500 5% albumin, 3u PRBC, 2 FFP, 400 UOP,   9/11: s/p perc cony    SUBJECTIVE:  Patient seen and examined by chief resident and team on AM rounds. Patient reports that he feels well, without any acute complaints. Lab holiday today.     MEDICATIONS  (STANDING):  amLODIPine   Tablet 10 milliGRAM(s) Oral daily  chlorhexidine 2% Cloths 1 Application(s) Topical <User Schedule>  diphenoxylate/atropine 2 Tablet(s) Oral every 6 hours  glucagon  Injectable 1 milliGRAM(s) IntraMuscular once  heparin   Injectable 5000 Unit(s) SubCutaneous every 8 hours  influenza  Vaccine (HIGH DOSE) 0.7 milliLiter(s) IntraMuscular once  levothyroxine Injectable 40 MICROGram(s) IV Push at bedtime  lipid, fat emulsion (Fish Oil and Plant Based) 20% Infusion 1.15 Gm/kG/Day (41.67 mL/Hr) IV Continuous <Continuous>  loperamide 4 milliGRAM(s) Oral every 6 hours  metoprolol tartrate Injectable 5 milliGRAM(s) IV Push every 6 hours  nystatin Powder 1 Application(s) Topical three times a day  opium Tincture 2 milliGRAM(s) Oral every 6 hours  Parenteral Nutrition - Adult 1 Each (104 mL/Hr) TPN Continuous <Continuous>  Parenteral Nutrition - Adult 1 Each TPN Continuous <Continuous>    MEDICATIONS  (PRN):  ondansetron Injectable 4 milliGRAM(s) IV Push every 8 hours PRN Nausea and/or Vomiting      Vital Signs Last 24 Hrs  T(C): 36.8 (11 Oct 2023 06:06), Max: 37.2 (10 Oct 2023 22:19)  T(F): 98.2 (11 Oct 2023 06:06), Max: 98.9 (10 Oct 2023 22:19)  HR: 88 (11 Oct 2023 09:00) (88 - 91)  BP: 135/63 (11 Oct 2023 09:00) (116/56 - 150/61)  BP(mean): 90 (11 Oct 2023 09:00) (80 - 91)  RR: 18 (11 Oct 2023 09:00) (13 - 26)  SpO2: 96% (11 Oct 2023 09:00) (96% - 100%)    Parameters below as of 11 Oct 2023 09:00  Patient On (Oxygen Delivery Method): room air        PHYSICAL EXAM:  GEN: NAD, resting comfortably in bed.   CV: RRR, no m/r/g  PULM: Nonlabored breathing, no respiratory distress. Breathing comfortably on RA.   ABD: Soft, NTND abdomen. Wound manager in place draining bilious fluid. Wound continues to heal appropriately, with granulation tissue overlying wound. LUQ JOYCE in place, with scant old bilious contents. R side ileostomy with red rubber in place.   EXTREM: WWP, no edema, SCDs in place  NEURO: AOX3.     I&O's Detail    10 Oct 2023 07:01  -  11 Oct 2023 07:00  --------------------------------------------------------  IN:    Fat Emulsion (Fish Oil &amp; Plant Based) 20% Infusion: 500.4 mL    Oral Fluid: 1025 mL    TPN (Total Parenteral Nutrition): 2392 mL  Total IN: 3917.4 mL    OUT:    Bulb (mL): 5 mL    Drain (mL): 70 mL    Drain (mL): 1380 mL    Fat Emulsion (Fish Oil &amp; Plant Based) 20% Infusion: 0 mL    Voided (mL): 2180 mL  Total OUT: 3635 mL    Total NET: 282.4 mL      11 Oct 2023 07:01  -  11 Oct 2023 12:13  --------------------------------------------------------  IN:    TPN (Total Parenteral Nutrition): 416 mL  Total IN: 416 mL    OUT:    Drain (mL): 200 mL    Voided (mL): 550 mL  Total OUT: 750 mL    Total NET: -334 mL          LABS:                        8.4    6.91  )-----------( 157      ( 10 Oct 2023 05:14 )             26.6     10-11    134<L>  |  101  |  33<H>  ----------------------------<  118<H>  4.6   |  26  |  1.18    Ca    8.8      11 Oct 2023 04:53  Phos  3.6     10-11  Mg     2.1     10-10    TPro  8.0  /  Alb  2.4<L>  /  TBili  3.1<H>  /  DBili  x   /  AST  65<H>  /  ALT  66<H>  /  AlkPhos  150<H>  10-11      Urinalysis Basic - ( 11 Oct 2023 04:53 )    Color: x / Appearance: x / SG: x / pH: x  Gluc: 118 mg/dL / Ketone: x  / Bili: x / Urobili: x   Blood: x / Protein: x / Nitrite: x   Leuk Esterase: x / RBC: x / WBC x   Sq Epi: x / Non Sq Epi: x / Bacteria: x        RADIOLOGY & ADDITIONAL STUDIES:

## 2023-10-11 NOTE — PROGRESS NOTE ADULT - ASSESSMENT
78 M w/ Crohn's, AFib/Flutter s/p DCCVs on amiodarone, remote ileocectomy and open appendectomy. Admitted (6/23) for SBO vs Crohns flare, s/p NGT decompression and s/p lap TRE converted to open TRE, SBR x 3, left in discontinuity with abthera vac on (6/27), RTOR for ileocolic resection, small bowel anastomosis, and abdominal wall closure on (6/28), c/b fluid collection s/p IR aspiration of perihepatic fluid on (7/3), c/b wound dehiscence s/p RTOR exlap, washout, ileocolic resection with end ileostomy, blow hole colostomy, red rubber from ileostomy to small bowel anastomosis; vicryl bridging mesh on (7/5) transferred to SICU postoperatively for hemodynamic monitoring, with hospital course complicated by periods of AMS most recently on 9/1 and associated with oliguria, severe ELVI and hyponatremia, which resolved but now stepped back up for to SICU on 9/10 for acute AMS, intermittent hypoglycemia, AFib with RVR. Perc Choley tube placed on 9/11 for enlarged GB.     Recommendations:  - Continue to monitor strict I/Os  - C/w TPN  Team 4c will continue to follow, please call with any questions or concerns

## 2023-10-12 NOTE — PROGRESS NOTE ADULT - SUBJECTIVE AND OBJECTIVE BOX
INTERVAL HPI/OVERNIGHT EVENTS:  AMRITA overnight.     STATUS POST:    6/27: Laparoscopic lysis of adhesions, converted to open lysis of adhesions, SBR x 3, temporary abdominal closure, 5L cryst, 1L 5% alb, 3 pRBC, 1 FFP, 1 plts  6/28: ex lap, removal of abthera, ileocolic resection, small bowel anastamosis, , 1.6 crystalloid, 250 5%, 175 uop   7/3: IR Gendel drained perihepatic aspiration of serous fluid.   7/5: RTOR exlap, washout, ileocolic resection with end ileostomy, blow hole colostomy, fistula, red rubber from ileostomy to small bowel anastomosis; vicryl bridging mesh; R JOYCE below ileostomy, L JOYCE at small bowel enterotomy repair; 500 LR, 500 5% albumin, 3u PRBC, 2 FFP, 400 UOP,   9/11: s/p perc cony    SUBJECTIVE:  Patient seen and examined by chief resident and team on AM rounds. Patient feels well. VS wnl. Midline enteroatmospheric fistula with 1.5L output in last 24 hours. Patient with no acute complaints. Now receiving 18 hrs TPN.     MEDICATIONS  (STANDING):  amLODIPine   Tablet 10 milliGRAM(s) Oral daily  chlorhexidine 2% Cloths 1 Application(s) Topical <User Schedule>  diphenoxylate/atropine 2 Tablet(s) Oral every 6 hours  glucagon  Injectable 1 milliGRAM(s) IntraMuscular once  heparin   Injectable 5000 Unit(s) SubCutaneous every 8 hours  influenza  Vaccine (HIGH DOSE) 0.7 milliLiter(s) IntraMuscular once  levothyroxine Injectable 40 MICROGram(s) IV Push at bedtime  lipid, fat emulsion (Fish Oil and Plant Based) 20% Infusion 1.15 Gm/kG/Day (41.67 mL/Hr) IV Continuous <Continuous>  loperamide 4 milliGRAM(s) Oral every 6 hours  metoprolol tartrate Injectable 5 milliGRAM(s) IV Push every 6 hours  nystatin Powder 1 Application(s) Topical three times a day  Parenteral Nutrition - Adult 1 Each TPN Continuous <Continuous>  Parenteral Nutrition - Adult 1 Each TPN Continuous <Continuous>    MEDICATIONS  (PRN):  ondansetron Injectable 4 milliGRAM(s) IV Push every 8 hours PRN Nausea and/or Vomiting      Vital Signs Last 24 Hrs  T(C): 36.9 (12 Oct 2023 05:41), Max: 36.9 (11 Oct 2023 22:10)  T(F): 98.4 (12 Oct 2023 05:41), Max: 98.4 (11 Oct 2023 22:10)  HR: 92 (12 Oct 2023 13:57) (87 - 92)  BP: 118/56 (12 Oct 2023 13:57) (117/56 - 140/65)  BP(mean): 79 (12 Oct 2023 13:57) (79 - 102)  RR: 23 (12 Oct 2023 13:57) (18 - 26)  SpO2: 99% (12 Oct 2023 13:57) (95% - 100%)    Parameters below as of 12 Oct 2023 10:00  Patient On (Oxygen Delivery Method): room air        PHYSICAL EXAM:  GEN: NAD, resting comfortably in bed.   CV: RRR, no m/r/g  PULM: Nonlabored breathing, no respiratory distress. Breathing comfortably on RA.   ABD: Soft, NTND abdomen. Wound manager in place draining bilious fluid. Wound continues to heal appropriately, with granulation tissue overlying wound. LUQ JOYCE in place, with scant old bilious contents. R side ileostomy with red rubber in place.   EXTREM: WWP, no edema, SCDs in place  NEURO: AOX3.         I&O's Detail    11 Oct 2023 07:01  -  12 Oct 2023 07:00  --------------------------------------------------------  IN:    Fat Emulsion (Fish Oil &amp; Plant Based) 20% Infusion: 540.8 mL    Oral Fluid: 737 mL    TPN (Total Parenteral Nutrition): 2392 mL  Total IN: 3669.8 mL    OUT:    Bulb (mL): 8 mL    Drain (mL): 55 mL    Drain (mL): 1825 mL    Voided (mL): 1900 mL  Total OUT: 3788 mL    Total NET: -118.2 mL      12 Oct 2023 07:01  -  12 Oct 2023 14:59  --------------------------------------------------------  IN:    Oral Fluid: 437 mL    TPN (Total Parenteral Nutrition): 728 mL  Total IN: 1165 mL    OUT:    Bulb (mL): 25 mL    Drain (mL): 575 mL    Drain (mL): 0 mL    Voided (mL): 300 mL  Total OUT: 900 mL    Total NET: 265 mL          LABS:    10-11    134<L>  |  101  |  33<H>  ----------------------------<  118<H>  4.6   |  26  |  1.18    Ca    8.8      11 Oct 2023 04:53  Phos  3.6     10-11    TPro  8.0  /  Alb  2.4<L>  /  TBili  3.1<H>  /  DBili  x   /  AST  65<H>  /  ALT  66<H>  /  AlkPhos  150<H>  10-11      Urinalysis Basic - ( 11 Oct 2023 04:53 )    Color: x / Appearance: x / SG: x / pH: x  Gluc: 118 mg/dL / Ketone: x  / Bili: x / Urobili: x   Blood: x / Protein: x / Nitrite: x   Leuk Esterase: x / RBC: x / WBC x   Sq Epi: x / Non Sq Epi: x / Bacteria: x        RADIOLOGY & ADDITIONAL STUDIES:

## 2023-10-12 NOTE — PROGRESS NOTE ADULT - SUBJECTIVE AND OBJECTIVE BOX
Interval Events:  Patient seen and examined at bedside.      Allergies    penicillin (Angioedema)    Intolerances        Vital Signs Last 24 Hrs  T(C): 36.9 (12 Oct 2023 05:41), Max: 37 (11 Oct 2023 13:00)  T(F): 98.4 (12 Oct 2023 05:41), Max: 98.6 (11 Oct 2023 13:00)  HR: 89 (12 Oct 2023 06:00) (87 - 96)  BP: 140/65 (12 Oct 2023 06:00) (105/66 - 140/65)  BP(mean): 93 (12 Oct 2023 06:00) (81 - 102)  RR: 19 (12 Oct 2023 06:00) (18 - 26)  SpO2: 95% (12 Oct 2023 06:00) (95% - 100%)    Parameters below as of 12 Oct 2023 06:00  Patient On (Oxygen Delivery Method): room air        10-11 @ 07:01  -  10-12 @ 07:00  --------------------------------------------------------  IN: 3669.8 mL / OUT: 3285 mL / NET: 384.8 mL      10-11 @ 07:01  -  10-12 @ 07:00  --------------------------------------------------------  IN: 3669.8 mL / OUT: 3285 mL / NET: 384.8 mL        Physical Exam:     GENERAL: NAD, Awake alert   NEURO: A&O x3 No focal deficits good recall.   HEENT: NCAT, MMM  C/V: Normal rate, normal peripheral perfusion, no murmurs  PULM: Nonlabored breathing, no respiratory distress, CTA bl  ABD: Soft, ND, NT, no rebound tenderness, no guarding, wound manager in place draining bilious fluid  EXTREM: WWP, no edema, SCDs in place  Vasc: + DP b/l   Skin: no rashes noted  MSK: No joint swelling  Psych: No signs of anxiety or depression      LABS:        10-11    134<L>  |  101  |  33<H>  ----------------------------<  118<H>  4.6   |  26  |  1.18    Ca    8.8      11 Oct 2023 04:53  Phos  3.6     10-11    TPro  8.0  /  Alb  2.4<L>  /  TBili  3.1<H>  /  DBili  x   /  AST  65<H>  /  ALT  66<H>  /  AlkPhos  150<H>  10-11          Urinalysis Basic - ( 11 Oct 2023 04:53 )    Color: x / Appearance: x / SG: x / pH: x  Gluc: 118 mg/dL / Ketone: x  / Bili: x / Urobili: x   Blood: x / Protein: x / Nitrite: x   Leuk Esterase: x / RBC: x / WBC x   Sq Epi: x / Non Sq Epi: x / Bacteria: x              RADIOLOGY & ADDITIONAL STUDIES (The following images were personally reviewed):          A/p:78 M w/ Crohn's, AFib/Flutter s/p DCCVs on amiodarone, remote ileocectomy and open appendectomy. Admitted (6/23) for SBO vs Crohns flare, s/p NGT decompression and s/p lap TRE converted to open TRE, SBR x 3, left in discontinuity with abthera vac on (6/27), RTOR for ileocolic resection, small bowel anastomosis, and abdominal wall closure on (6/28), c/b fluid collection s/p IR aspiration of perihepatic fluid on (7/3), c/b wound dehiscence s/p RTOR exlap, washout, ileocolic resection with end ileostomy, blow hole colostomy, red rubber from ileostomy to small bowel anastomosis; vicryl bridging mesh on (7/5) transferred to SICU postoperatively for hemodynamic monitoring, with hospital course complicated by periods of AMS most recently on 9/1 and associated with oliguria, severe ELVI and hyponatremia, which resolved but now stepped back up for to SICU on 9/10 for acute AMS, intermittent hypoglycemia, AFib with RVR. Perc Choley tube placed on 9/11 for enlarged GB.     NEURO: AMS (resolved), EEG neg. Hx of depression - Effexor Dc'd on 9/10 due to delirium Pain control with Dilaudid for vac changes only. Cont Melatonin prn Insomnia, Cont Zofran   HENT: Cepacol  CV: Hx Afib/flutter: s/p DCCV, Off amiodarone given transaminitis; Continue Metoprolol 5q6. Hx HLD: Lipitor Held given LFTs. TTE  (7/18) - PASP 64mmHg, EF 65-70%,  Hx HTN - improved on Norvasc 10qd. Holding Losartan  Cont Lopressor 5 q6.   PULM: no resp distress on room air.   GI/FEN: Cont CLD with ensure enlive.  Cont TPN for high ostomy and ECF output: Cont Octreotide in TPN. cont Tincture of opium 2mg q6, Imodium, and Lomotil. PICC/TPN (Lipids 100gm, Dex@27/kg, AA @1.6). Transaminitis of unknown origin, now stable. s/p Perc Choley on 9/11. PPI.   : Voids, S/p ELVI resolved.  ENDO: No need to check ISS. Hx hypothyroid: IV Synthroid, TSH wnl on 9/10  ID: Numerous SICU admissions for sepsis. Currently off abx. Cont Nystatin powder // PREVIOUSLY had C. tertium, Lactobacillis from his IR cx 7/3, and candida albicans, lactobacillus, vanc sensitive E faecium, vanc resistant E gallinarum, vanc resistant E casseliflavus, lactobacillus from his OR cx 7/5. Completed course of abx with Imipenem (9/10-9/15). Imipenem (8/26--) imipenem (6/30-7/12, 7/23-7/30), Dapto (6/30-7/5 and 7/23-7/24).  empiric dapto (8/23-24) and cefepime (8/23-24).   PPX: SCDs, SQH  LINES: PIVs, LUE PICC (9/1- )  WOUNDS/DRAINS: Abdominal wound and fistula with wound manager in place dressing change M &F . Rt LQ Ostomy. JOYCE.   PT: Ambulate as tolerated   DISPO: SICU, q4 vitals

## 2023-10-12 NOTE — PROGRESS NOTE ADULT - ASSESSMENT
78 M w/ Crohn's, AFib/Flutter s/p DCCVs on amiodarone, remote ileocectomy and open appendectomy. Admitted (6/23) for SBO vs Crohns flare, s/p NGT decompression and s/p lap TRE converted to open TRE, SBR x 3, left in discontinuity with abthera vac on (6/27), RTOR for ileocolic resection, small bowel anastomosis, and abdominal wall closure on (6/28), c/b fluid collection s/p IR aspiration of perihepatic fluid on (7/3), c/b wound dehiscence s/p RTOR exlap, washout, ileocolic resection with end ileostomy, blow hole colostomy, red rubber from ileostomy to small bowel anastomosis; vicryl bridging mesh on (7/5) transferred to SICU postoperatively for hemodynamic monitoring, with hospital course complicated by periods of AMS most recently on 9/1 and associated with oliguria, severe ELVI and hyponatremia, which resolved but now stepped back up for to SICU on 9/10 for acute AMS, intermittent hypoglycemia, AFib with RVR. Perc Choley tube placed on 9/11 for enlarged GB.    Recommendations:  Discontinue opium tincture  Continue TPN  Continue max 3 ensures/day  Encourage OOB/ambulation  Continue IV medications  Continue: octreotide, lomotil, imodium  Continue wound care evaluation   Rest of care per SICU team    Team 5c will continue to follow

## 2023-10-12 NOTE — PROGRESS NOTE ADULT - SUBJECTIVE AND OBJECTIVE BOX
STATUS POST:    6/27: Laparoscopic lysis of adhesions, converted to open lysis of adhesions, SBR x 3, temporary abdominal closure, 5L cryst, 1L 5% alb, 3 pRBC, 1 FFP, 1 plts  6/28: ex lap, removal of abthera, ileocolic resection, small bowel anastamosis, , 1.6 crystalloid, 250 5%, 175 uop   7/3: IR Gendel drained perihepatic aspiration of serous fluid.   7/5: RTOR exlap, washout, ileocolic resection with end ileostomy, blow hole colostomy, fistula, red rubber from ileostomy to small bowel anastomosis; vicryl bridging mesh; R JOYCE below ileostomy, L JOYCE at small bowel enterotomy repair; 500 LR, 500 5% albumin, 3u PRBC, 2 FFP, 400 UOP,   9/11: s/p perc cony    SUBJECTIVE: Patient seen and examined at bedside with chief resident. Patient states that he is feeling well with no acute complaints this morning. He denies abdominal pain, nausea, vomiting, chest pain, and shortness of breath.       amLODIPine   Tablet 10 milliGRAM(s) Oral daily  heparin   Injectable 5000 Unit(s) SubCutaneous every 8 hours  metoprolol tartrate Injectable 5 milliGRAM(s) IV Push every 6 hours      Vital Signs Last 24 Hrs  T(C): 36.9 (12 Oct 2023 05:41), Max: 37 (11 Oct 2023 13:00)  T(F): 98.4 (12 Oct 2023 05:41), Max: 98.6 (11 Oct 2023 13:00)  HR: 88 (12 Oct 2023 10:00) (87 - 96)  BP: 117/56 (12 Oct 2023 10:00) (105/66 - 140/65)  BP(mean): 80 (12 Oct 2023 10:00) (80 - 102)  RR: 25 (12 Oct 2023 10:00) (18 - 26)  SpO2: 97% (12 Oct 2023 10:00) (95% - 100%)    Parameters below as of 12 Oct 2023 10:00  Patient On (Oxygen Delivery Method): room air      I&O's Detail    11 Oct 2023 07:01  -  12 Oct 2023 07:00  --------------------------------------------------------  IN:    Fat Emulsion (Fish Oil &amp; Plant Based) 20% Infusion: 540.8 mL    Oral Fluid: 737 mL    TPN (Total Parenteral Nutrition): 2392 mL  Total IN: 3669.8 mL    OUT:    Bulb (mL): 8 mL    Drain (mL): 55 mL    Drain (mL): 1825 mL    Voided (mL): 1900 mL  Total OUT: 3788 mL    Total NET: -118.2 mL      12 Oct 2023 07:01  -  12 Oct 2023 10:56  --------------------------------------------------------  IN:    TPN (Total Parenteral Nutrition): 416 mL  Total IN: 416 mL    OUT:    Bulb (mL): 25 mL    Drain (mL): 325 mL    Voided (mL): 300 mL  Total OUT: 650 mL    Total NET: -234 mL          General: NAD, resting comfortably in bed  C/V: NSR  Pulm: Nonlabored breathing, no respiratory distress  Abd: soft, NT/ND, wound manager in place with bilious fluid  Extrem: WWP, no edema, SCDs in place  Drains: LUQ JOYCE in place with scant output    LABS:    10-11    134<L>  |  101  |  33<H>  ----------------------------<  118<H>  4.6   |  26  |  1.18    Ca    8.8      11 Oct 2023 04:53  Phos  3.6     10-11    TPro  8.0  /  Alb  2.4<L>  /  TBili  3.1<H>  /  DBili  x   /  AST  65<H>  /  ALT  66<H>  /  AlkPhos  150<H>  10-11      Urinalysis Basic - ( 11 Oct 2023 04:53 )    Color: x / Appearance: x / SG: x / pH: x  Gluc: 118 mg/dL / Ketone: x  / Bili: x / Urobili: x   Blood: x / Protein: x / Nitrite: x   Leuk Esterase: x / RBC: x / WBC x   Sq Epi: x / Non Sq Epi: x / Bacteria: x

## 2023-10-12 NOTE — PROGRESS NOTE ADULT - NS ATTEND AMEND GEN_ALL_CORE FT
78 year old male with hx crohn's, atrial fibrillation s/p SBR for SBO with ileocolic resection and ileostomy, on TPN.  perc cony tube capped. Some increased gut output. Trialling  small volume of ensure. Tincture of opium not helping, dcing. Close monitoring of output.

## 2023-10-13 NOTE — PROGRESS NOTE ADULT - SUBJECTIVE AND OBJECTIVE BOX
S: No new issues/events overnight, no new med c/o    O: ICU Vital Signs Last 24 Hrs  T(F): 98.3 (10-13-23 @ 11:32), Max: 98.7 (10-13-23 @ 05:10)  HR: 94 (10-13-23 @ 12:20) (89 - 94)  BP: 110/55 (10-13-23 @ 12:20) (95/50 - 150/66)  BP(mean): 76 (10-13-23 @ 12:20) (69 - 95)  ABP: --  RR: 17 (10-13-23 @ 12:20) (14 - 22)  SpO2: 98% (10-13-23 @ 12:20) (95% - 99%)    PHYSICAL EXAM:   Neurological: AAOx3, CNII-XII intact,  strength 5/5 b/l  ENT: mucus membrane moist  Cardiovascular: RRR  Respiratory: CTA  Gastrointestinal: soft, NT, ND, BS+, wound manager with yellowish drainage. right sided red rubber tube intact,   Extremities: warm, no dependent edema  Vascular: no cyanosis/erythema  Skin: no rashes  MSK: no joint swelling.     LABS:    10-13    137  |  101  |  42<H>  ----------------------------<  117<H>  4.5   |  28  |  1.29    Ca    8.9      13 Oct 2023 05:30  Phos  2.5     10-13  Mg     2.0     10-13    TPro  8.1  /  Alb  2.4<L>  /  TBili  3.8<H>  /  DBili  x   /  AST  69<H>  /  ALT  68<H>  /  AlkPhos  148<H>  10-13  LIVER FUNCTIONS - ( 13 Oct 2023 05:30 )  Alb: 2.4 g/dL / Pro: 8.1 g/dL / ALK PHOS: 148 U/L / ALT: 68 U/L / AST: 69 U/L / GGT: x                               8.8    8.59  )-----------( 151      ( 13 Oct 2023 05:30 )             28.4     Urinalysis Basic - ( 13 Oct 2023 05:30 )    Color: x / Appearance: x / SG: x / pH: x  Gluc: 117 mg/dL / Ketone: x  / Bili: x / Urobili: x   Blood: x / Protein: x / Nitrite: x   Leuk Esterase: x / RBC: x / WBC x   Sq Epi: x / Non Sq Epi: x / Bacteria: x    CAPILLARY BLOOD GLUCOSE        MEDICATIONS  (STANDING):  amLODIPine   Tablet 10 milliGRAM(s) Oral daily  chlorhexidine 2% Cloths 1 Application(s) Topical <User Schedule>  diphenoxylate/atropine 2 Tablet(s) Oral every 6 hours  glucagon  Injectable 1 milliGRAM(s) IntraMuscular once  heparin   Injectable 5000 Unit(s) SubCutaneous every 8 hours  influenza  Vaccine (HIGH DOSE) 0.7 milliLiter(s) IntraMuscular once  levothyroxine Injectable 40 MICROGram(s) IV Push at bedtime  lipid, fat emulsion (Fish Oil and Plant Based) 20% Infusion 1.15 Gm/kG/Day (41.67 mL/Hr) IV Continuous <Continuous>  loperamide 4 milliGRAM(s) Oral every 6 hours  metoprolol tartrate Injectable 5 milliGRAM(s) IV Push every 6 hours  nystatin Powder 1 Application(s) Topical three times a day  Parenteral Nutrition - Adult 1 Each TPN Continuous <Continuous>  Parenteral Nutrition - Adult 1 Each TPN Continuous <Continuous>    MEDICATIONS  (PRN):  ondansetron Injectable 4 milliGRAM(s) IV Push every 8 hours PRN Nausea and/or Vomiting      Poole:	  [ x] None	[ ] Daily Poole Order Placed	   Indication:	  [ ] Strict I and O's    [ ] Obstruction     [ ] Incontinence + Stage 3 or 4 Decubitus  Central Line:  [ ] None	   [ x]  Medication / TPN Administration     [ ] No Peripheral IV

## 2023-10-13 NOTE — PROGRESS NOTE ADULT - ASSESSMENT
78 M w/ Crohn's, AFib/Flutter s/p DCCVs on amiodarone, remote ileocectomy and open appendectomy. Admitted (6/23) for SBO vs Crohns flare, s/p NGT decompression and s/p lap TRE converted to open TRE, SBR x 3, left in discontinuity with abthera vac on (6/27), RTOR for ileocolic resection, small bowel anastomosis, and abdominal wall closure on (6/28), c/b fluid collection s/p IR aspiration of perihepatic fluid on (7/3), c/b wound dehiscence s/p RTOR exlap, washout, ileocolic resection with end ileostomy, blow hole colostomy, red rubber from ileostomy to small bowel anastomosis; vicryl bridging mesh on (7/5) transferred to SICU postoperatively for hemodynamic monitoring, with hospital course complicated by periods of AMS most recently on 9/1 and associated with oliguria, severe ELVI and hyponatremia, which resolved but now stepped back up for to SICU on 9/10 for acute AMS, intermittent hypoglycemia, AFib with RVR. Perc Choley tube placed on 9/11 for enlarged GB.    Recommendations:  Continue TPN  Continue max 3 ensures/day  Encourage OOB/ambulation  Continue IV medications  Continue: octreotide, lomotil, imodium  Continue wound care evaluation   Rest of care per SICU team    Team 5c will continue to follow

## 2023-10-13 NOTE — PROGRESS NOTE ADULT - ASSESSMENT
78 M w/ Crohn's, AFib/Flutter s/p DCCVs on amiodarone, remote ileocectomy and open appendectomy. Admitted (6/23) for SBO vs Crohns flare, s/p NGT decompression and s/p lap TRE converted to open TRE, SBR x 3, left in discontinuity with abthera vac on (6/27), RTOR for ileocolic resection, small bowel anastomosis, and abdominal wall closure on (6/28), c/b fluid collection s/p IR aspiration of perihepatic fluid on (7/3), c/b wound dehiscence s/p RTOR exlap, washout, ileocolic resection with end ileostomy, blow hole colostomy, red rubber from ileostomy to small bowel anastomosis; vicryl bridging mesh on (7/5) transferred to SICU postoperatively for hemodynamic monitoring, with hospital course complicated by periods of AMS most recently on 9/1 and associated with oliguria, severe ELVI and hyponatremia, which resolved but now stepped back up for to SICU on 9/10 for acute AMS, intermittent hypoglycemia, AFib with RVR. Perc Choley tube placed on 9/11 for enlarged GB, capped 10/5    NEURO: AMS (resolved), EEG neg. Hx of depression - Effexor Dc'd on 9/10 due to delirium, Pain control with Dilaudid for vac changes only. Cont Melatonin prn Insomnia, Cont Zofran   HENT: Cepacol  CV: Hx Afib/flutter: s/p DCCV, Off amiodarone given transaminitis; Continue Metoprolol 5q6. Hx HLD: Lipitor Held given high LFTs. TTE  (7/18) - PASP 64mmHg, EF 65-70%,  Hx HTN -  cont Norvasc 10qd. Holding Losartan  Cont Lopressor 5 q6.   PULM: no resp distress on room air.   GI/FEN: Cont Ensure x 3/day. Cont TPN (2.5liters/day volume) for high ostomy and ECF output: Cont Octreotide in TPN. cont Tincture of opium 2mg q6, Imodium, and Lomotil. Transaminitis of unknown origin, now stable. s/p Perc Choley on 9/11 - tube capped 10/5. PPI.   : Voids, S/p ELVI resolved.  ENDO: No need to check ISS. Hx hypothyroid: IV Synthroid, TSH wnl on 9/10  ID: Numerous SICU admissions for sepsis. Currently off abx. Cont Nystatin powder // PREVIOUSLY had C. tertium, Lactobacillis from his IR cx 7/3, and candida albicans, lactobacillus, vanc sensitive E faecium, vanc resistant E gallinarum, vanc resistant E casseliflavus, lactobacillus from his OR cx 7/5. Completed course of abx with Imipenem (9/10-9/15). Imipenem (8/26--) imipenem (6/30-7/12, 7/23-7/30), Dapto (6/30-7/5 and 7/23-7/24).  empiric dapto (8/23-24) and cefepime (8/23-24).   PPX: SCDs, SQH  LINES: PIVs, LUE PICC (9/1- )  WOUNDS/DRAINS: Abdominal wound and fistula with wound manager in place dressing change M &F . Rt LQ Ostomy. JOYCE.   PT: Ambulate as tolerated   DISPO: SICU due to complicated hospital course. but will do vitals q4hrs (hold overnight), and Labs MWF

## 2023-10-13 NOTE — PROGRESS NOTE ADULT - ATTENDING COMMENTS
78 year old male with hx crohn's, atrial fibrillation s/p SBR for SBO with ileocolic resection and ileostomy, on TPN.  perc cony tube capped. Some gut output stable, elevated. Trialling  small volume of ensure. Close monitoring of output. Mild increase in creatinine today. Continue to monitor.

## 2023-10-13 NOTE — PROGRESS NOTE ADULT - SUBJECTIVE AND OBJECTIVE BOX
INTERVAL HPI/OVERNIGHT EVENTS:  AMRITA overnight. D/c'd opium tincture.     STATUS POST:    6/27: Laparoscopic lysis of adhesions, converted to open lysis of adhesions, SBR x 3, temporary abdominal closure, 5L cryst, 1L 5% alb, 3 pRBC, 1 FFP, 1 plts  6/28: ex lap, removal of abthera, ileocolic resection, small bowel anastamosis, , 1.6 crystalloid, 250 5%, 175 uop   7/3: IR Gendel drained perihepatic aspiration of serous fluid.   7/5: RTOR exlap, washout, ileocolic resection with end ileostomy, blow hole colostomy, fistula, red rubber from ileostomy to small bowel anastomosis; vicryl bridging mesh; R JOYCE below ileostomy, L JOYCE at small bowel enterotomy repair; 500 LR, 500 5% albumin, 3u PRBC, 2 FFP, 400 UOP,   9/11: s/p perc cony    SUBJECTIVE:  Patient seen and examined by chief resident and team on AM rounds. Patient without any acute complaints. Midline with 450/1625cc output. No signs of dehydration at this time.     MEDICATIONS  (STANDING):  amLODIPine   Tablet 10 milliGRAM(s) Oral daily  chlorhexidine 2% Cloths 1 Application(s) Topical <User Schedule>  diphenoxylate/atropine 2 Tablet(s) Oral every 6 hours  glucagon  Injectable 1 milliGRAM(s) IntraMuscular once  heparin   Injectable 5000 Unit(s) SubCutaneous every 8 hours  influenza  Vaccine (HIGH DOSE) 0.7 milliLiter(s) IntraMuscular once  levothyroxine Injectable 40 MICROGram(s) IV Push at bedtime  lipid, fat emulsion (Fish Oil and Plant Based) 20% Infusion 1.15 Gm/kG/Day (41.67 mL/Hr) IV Continuous <Continuous>  loperamide 4 milliGRAM(s) Oral every 6 hours  metoprolol tartrate Injectable 5 milliGRAM(s) IV Push every 6 hours  nystatin Powder 1 Application(s) Topical three times a day  Parenteral Nutrition - Adult 1 Each TPN Continuous <Continuous>    MEDICATIONS  (PRN):  ondansetron Injectable 4 milliGRAM(s) IV Push every 8 hours PRN Nausea and/or Vomiting      Vital Signs Last 24 Hrs  T(C): 36.9 (13 Oct 2023 07:21), Max: 37.1 (13 Oct 2023 05:10)  T(F): 98.4 (13 Oct 2023 07:21), Max: 98.7 (13 Oct 2023 05:10)  HR: 91 (13 Oct 2023 07:00) (89 - 92)  BP: 95/50 (13 Oct 2023 07:00) (95/50 - 150/66)  BP(mean): 69 (13 Oct 2023 07:00) (69 - 95)  RR: 14 (13 Oct 2023 07:00) (14 - 23)  SpO2: 95% (13 Oct 2023 07:00) (95% - 99%)    Parameters below as of 13 Oct 2023 07:00  Patient On (Oxygen Delivery Method): room air        PHYSICAL EXAM:  GEN: NAD, resting comfortably in bed.   CV: RRR, no m/r/g  PULM: Nonlabored breathing, no respiratory distress. Breathing comfortably on RA.   ABD: Soft, NTND abdomen. Wound manager in place draining bilious fluid. Wound continues to heal appropriately, with granulation tissue overlying wound. LUQ JOYCE in place, with scant old bilious contents. R side ileostomy with red rubber in place.   EXTREM: WWP, no edema, SCDs in place  NEURO: AOX3.       I&O's Detail    12 Oct 2023 07:01  -  13 Oct 2023 07:00  --------------------------------------------------------  IN:    Fat Emulsion (Fish Oil &amp; Plant Based) 20% Infusion: 457.6 mL    Oral Fluid: 874 mL    TPN (Total Parenteral Nutrition): 2599 mL  Total IN: 3930.6 mL    OUT:    Bulb (mL): 35 mL    Drain (mL): 90 mL    Drain (mL): 1625 mL    Voided (mL): 1730 mL  Total OUT: 3480 mL    Total NET: 450.6 mL      13 Oct 2023 07:01  -  13 Oct 2023 10:13  --------------------------------------------------------  IN:    Fat Emulsion (Fish Oil &amp; Plant Based) 20% Infusion: 41.6 mL    TPN (Total Parenteral Nutrition): 41.6 mL  Total IN: 83.2 mL    OUT:  Total OUT: 0 mL    Total NET: 83.2 mL          LABS:                        8.8    8.59  )-----------( 151      ( 13 Oct 2023 05:30 )             28.4     10-13    137  |  101  |  42<H>  ----------------------------<  117<H>  4.5   |  28  |  1.29    Ca    8.9      13 Oct 2023 05:30  Phos  2.5     10-13  Mg     2.0     10-13    TPro  8.1  /  Alb  2.4<L>  /  TBili  3.8<H>  /  DBili  x   /  AST  69<H>  /  ALT  68<H>  /  AlkPhos  148<H>  10-13      Urinalysis Basic - ( 13 Oct 2023 05:30 )    Color: x / Appearance: x / SG: x / pH: x  Gluc: 117 mg/dL / Ketone: x  / Bili: x / Urobili: x   Blood: x / Protein: x / Nitrite: x   Leuk Esterase: x / RBC: x / WBC x   Sq Epi: x / Non Sq Epi: x / Bacteria: x        RADIOLOGY & ADDITIONAL STUDIES:

## 2023-10-13 NOTE — PROGRESS NOTE ADULT - ASSESSMENT
78 M w/ Crohn's, AFib/Flutter s/p DCCVs on amiodarone, remote ileocectomy and open appendectomy. Admitted (6/23) for SBO vs Crohns flare, s/p NGT decompression and s/p lap TRE converted to open TRE, SBR x 3, left in discontinuity with abthera vac on (6/27), RTOR for ileocolic resection, small bowel anastomosis, and abdominal wall closure on (6/28), c/b fluid collection s/p IR aspiration of perihepatic fluid on (7/3), c/b wound dehiscence s/p RTOR exlap, washout, ileocolic resection with end ileostomy, blow hole colostomy, red rubber from ileostomy to small bowel anastomosis; vicryl bridging mesh on (7/5) transferred to SICU postoperatively for hemodynamic monitoring, with hospital course complicated by periods of AMS most recently on 9/1 and associated with oliguria, severe ELVI and hyponatremia, which resolved but now stepped back up for to SICU on 9/10 for acute AMS, intermittent hypoglycemia, AFib with RVR. Perc Choley tube placed on 9/11 for enlarged GB.     Recommendations:  - Continue to monitor strict I/Os  - C/w TPN  - Wound  today at bedside  - Lab holiday today, next labs tomorrow 10/15  Team 4c will continue to follow, please call with any questions or concerns  Plan discussed with chief resident and attending surgeon, Dr. Knapp

## 2023-10-13 NOTE — PROGRESS NOTE ADULT - SUBJECTIVE AND OBJECTIVE BOX
STATUS POST:    6/27: Laparoscopic lysis of adhesions, converted to open lysis of adhesions, SBR x 3, temporary abdominal closure, 5L cryst, 1L 5% alb, 3 pRBC, 1 FFP, 1 plts  6/28: ex lap, removal of abthera, ileocolic resection, small bowel anastamosis, , 1.6 crystalloid, 250 5%, 175 uop   7/3: IR Gendel drained perihepatic aspiration of serous fluid.   7/5: RTOR exlap, washout, ileocolic resection with end ileostomy, blow hole colostomy, fistula, red rubber from ileostomy to small bowel anastomosis; vicryl bridging mesh; R JOYCE below ileostomy, L JOYCE at small bowel enterotomy repair; 500 LR, 500 5% albumin, 3u PRBC, 2 FFP, 400 UOP,   9/11: s/p perc cony    SUBJECTIVE: Patient seen and examined at bedside with chief resident. Patient states that he feels well with no acute complaints. He endorses consuming two ensures yesterday.       amLODIPine   Tablet 10 milliGRAM(s) Oral daily  heparin   Injectable 5000 Unit(s) SubCutaneous every 8 hours  metoprolol tartrate Injectable 5 milliGRAM(s) IV Push every 6 hours      Vital Signs Last 24 Hrs  T(C): 36.8 (13 Oct 2023 11:32), Max: 37.1 (13 Oct 2023 05:10)  T(F): 98.3 (13 Oct 2023 11:32), Max: 98.7 (13 Oct 2023 05:10)  HR: 94 (13 Oct 2023 12:20) (89 - 94)  BP: 110/55 (13 Oct 2023 12:20) (95/50 - 150/66)  BP(mean): 76 (13 Oct 2023 12:20) (69 - 95)  RR: 17 (13 Oct 2023 12:20) (14 - 22)  SpO2: 98% (13 Oct 2023 12:20) (95% - 99%)    Parameters below as of 13 Oct 2023 12:20  Patient On (Oxygen Delivery Method): room air      I&O's Detail    12 Oct 2023 07:01  -  13 Oct 2023 07:00  --------------------------------------------------------  IN:    Fat Emulsion (Fish Oil &amp; Plant Based) 20% Infusion: 457.6 mL    Oral Fluid: 874 mL    TPN (Total Parenteral Nutrition): 2599 mL  Total IN: 3930.6 mL    OUT:    Bulb (mL): 35 mL    Drain (mL): 90 mL    Drain (mL): 1625 mL    Voided (mL): 1730 mL  Total OUT: 3480 mL    Total NET: 450.6 mL      13 Oct 2023 07:01  -  13 Oct 2023 13:58  --------------------------------------------------------  IN:    Fat Emulsion (Fish Oil &amp; Plant Based) 20% Infusion: 41.6 mL    TPN (Total Parenteral Nutrition): 1029 mL  Total IN: 1070.6 mL    OUT:    Bulb (mL): 0 mL    Drain (mL): 500 mL    Drain (mL): 0 mL    Voided (mL): 500 mL  Total OUT: 1000 mL    Total NET: 70.6 mL      General: NAD, resting comfortably in bed  C/V: NSR  Pulm: Nonlabored breathing, no respiratory distress  Abd: soft, NT/ND, wound manager in place with bilious fluid  Extrem: WWP, no edema, SCDs in place  Drains: LUQ JOYCE in place with scant output        LABS:                        8.8    8.59  )-----------( 151      ( 13 Oct 2023 05:30 )             28.4     10-13    137  |  101  |  42<H>  ----------------------------<  117<H>  4.5   |  28  |  1.29    Ca    8.9      13 Oct 2023 05:30  Phos  2.5     10-13  Mg     2.0     10-13    TPro  8.1  /  Alb  2.4<L>  /  TBili  3.8<H>  /  DBili  x   /  AST  69<H>  /  ALT  68<H>  /  AlkPhos  148<H>  10-13      Urinalysis Basic - ( 13 Oct 2023 05:30 )    Color: x / Appearance: x / SG: x / pH: x  Gluc: 117 mg/dL / Ketone: x  / Bili: x / Urobili: x   Blood: x / Protein: x / Nitrite: x   Leuk Esterase: x / RBC: x / WBC x   Sq Epi: x / Non Sq Epi: x / Bacteria: x        RADIOLOGY & ADDITIONAL STUDIES:

## 2023-10-13 NOTE — ADVANCED PRACTICE NURSE CONSULT - ASSESSMENT
New Dustin's wound manager placed with surgery team at bedside. Wound bed red, hypergranulated, evidence of healing at periwounds. Green effluent with gas in old appliance. Periwound protected with Cavilon, stoma paste and stoma rings prior to placing new wound manager. New 1 3/4" Jayleen 2 piece appliance placed over ileostomy site.

## 2023-10-14 NOTE — PROGRESS NOTE ADULT - ASSESSMENT
78 M w/ Crohn's, AFib/Flutter s/p DCCVs on amiodarone, remote ileocectomy and open appendectomy. Admitted (6/23) for SBO vs Crohns flare, s/p NGT decompression and s/p lap TRE converted to open TRE, SBR x 3, left in discontinuity with abthera vac on (6/27), RTOR for ileocolic resection, small bowel anastomosis, and abdominal wall closure on (6/28), c/b fluid collection s/p IR aspiration of perihepatic fluid on (7/3), c/b wound dehiscence s/p RTOR exlap, washout, ileocolic resection with end ileostomy, blow hole colostomy, red rubber from ileostomy to small bowel anastomosis; vicryl bridging mesh on (7/5) transferred to SICU postoperatively for hemodynamic monitoring, with hospital course complicated by periods of AMS most recently on 9/1 and associated with oliguria, severe ELVI and hyponatremia, which resolved but now stepped back up for to SICU on 9/10 for acute AMS, intermittent hypoglycemia, AFib with RVR. Perc Choley tube placed on 9/11 for enlarged GB, capped 10/5    NEURO: AMS (resolved), EEG neg. Hx of depression - Effexor Dc'd on 9/10 due to delirium. Cont Melatonin prn Insomnia, Cont Zofran   HENT: Cepacol  CV: Hx Afib/flutter: s/p DCCV, Off amiodarone given transaminitis; Continue Metoprolol 5q6. Hx HLD: Lipitor Held given high LFTs. TTE  (7/18) - PASP 64mmHg, EF 65-70%,  Hx HTN -  cont Norvasc 10qd. Holding Losartan  Cont Lopressor 5 q6.   PULM: no resp distress on room air.   GI/FEN: Cont Ensure x 3/day. Cont TPN (2.5liters/day volume) for high ostomy and ECF output: Cont Octreotide in TPN, Imodium, and Lomotil. Transaminitis of unknown origin, now stable. s/p Perc Choley on 9/11 - tube capped 10/5. PPI. Wound manager overlying   : Voids, S/p ELVI resolved.  ENDO: No need to check ISS. Hx hypothyroid: IV Synthroid, TSH wnl on 9/10  ID: Numerous SICU admissions for sepsis. Currently off abx. Cont Nystatin powder // PREVIOUSLY had C. tertium, Lactobacillis from his IR cx 7/3, and candida albicans, lactobacillus, vanc sensitive E faecium, vanc resistant E gallinarum, vanc resistant E casseliflavus, lactobacillus from his OR cx 7/5. Completed course of abx with Imipenem (9/10-9/15). Imipenem (8/26--) imipenem (6/30-7/12, 7/23-7/30), Dapto (6/30-7/5 and 7/23-7/24).  empiric dapto (8/23-24) and cefepime (8/23-24).   PPX: SCDs, SQH  LINES: PIVs, LUE PICC (9/1- )  WOUNDS/DRAINS: Abdominal wound and fistula with wound manager in place dressing change M &F. Rt LQ Ostomy. JOYCE.   PT: Ambulate as tolerated   DISPO: SICU due to complicated hospital course. but will do vitals q4hrs (hold overnight), and Labs MWF

## 2023-10-14 NOTE — PROGRESS NOTE ADULT - SUBJECTIVE AND OBJECTIVE BOX
INTERVAL HPI/OVERNIGHT EVENTS:  ON: lopressor, reported profuse sweating when walking and feeling weak, overnight requesting for ice and cold towels no fever, questionable delirium noted by nighttime resident. Drank total of 3 bottles       STATUS POST:    6/27: Laparoscopic lysis of adhesions, converted to open lysis of adhesions, SBR x 3, temporary abdominal closure, 5L cryst, 1L 5% alb, 3 pRBC, 1 FFP, 1 plts  6/28: ex lap, removal of abthera, ileocolic resection, small bowel anastamosis, , 1.6 crystalloid, 250 5%, 175 uop   7/3: IR Gendel drained perihepatic aspiration of serous fluid.   7/5: RTOR exlap, washout, ileocolic resection with end ileostomy, blow hole colostomy, fistula, red rubber from ileostomy to small bowel anastomosis; vicryl bridging mesh; R JOYCE below ileostomy, L JOYCE at small bowel enterotomy repair; 500 LR, 500 5% albumin, 3u PRBC, 2 FFP, 400 UOP,   9/11: s/p perc cony    SUBJECTIVE:  Patient seen and examined by chief resident and team on AM rounds. Patient reports feeling well. Aox3, no issues w/ mentation. Patient appears slightly dehydrated, with dry mucus membranes, with 2.3L output overnight from midline fistula. Will give 500cc bolus x1 and reassess. BP soft, however MAPs >65, voiding adequately.     MEDICATIONS  (STANDING):  chlorhexidine 2% Cloths 1 Application(s) Topical <User Schedule>  diphenoxylate/atropine 2 Tablet(s) Oral every 6 hours  glucagon  Injectable 1 milliGRAM(s) IntraMuscular once  heparin   Injectable 5000 Unit(s) SubCutaneous every 8 hours  influenza  Vaccine (HIGH DOSE) 0.7 milliLiter(s) IntraMuscular once  levothyroxine Injectable 40 MICROGram(s) IV Push at bedtime  lipid, fat emulsion (Fish Oil and Plant Based) 20% Infusion 1.15 Gm/kG/Day (41.67 mL/Hr) IV Continuous <Continuous>  loperamide 4 milliGRAM(s) Oral every 6 hours  metoprolol tartrate Injectable 5 milliGRAM(s) IV Push every 6 hours  nystatin Powder 1 Application(s) Topical three times a day  Parenteral Nutrition - Adult 1 Each TPN Continuous <Continuous>  Parenteral Nutrition - Adult 1 Each TPN Continuous <Continuous>    MEDICATIONS  (PRN):  ondansetron Injectable 4 milliGRAM(s) IV Push every 8 hours PRN Nausea and/or Vomiting      Vital Signs Last 24 Hrs  T(C): 36.4 (14 Oct 2023 09:41), Max: 36.8 (13 Oct 2023 11:32)  T(F): 97.5 (14 Oct 2023 09:41), Max: 98.3 (13 Oct 2023 11:32)  HR: 92 (14 Oct 2023 08:10) (92 - 107)  BP: 114/56 (14 Oct 2023 08:10) (90/52 - 131/61)  BP(mean): 80 (14 Oct 2023 08:10) (65 - 88)  RR: 24 (14 Oct 2023 08:10) (17 - 36)  SpO2: 99% (14 Oct 2023 08:10) (95% - 100%)    Parameters below as of 14 Oct 2023 08:10  Patient On (Oxygen Delivery Method): room air        PHYSICAL EXAM:  GEN: NAD, resting comfortably in bed.   CV: RRR, no m/r/g  PULM: Nonlabored breathing, no respiratory distress. Breathing comfortably on RA.   ABD: Soft, NTND abdomen. Wound manager in place draining bilious fluid. Wound continues to heal appropriately, with granulation tissue overlying wound. LUQ JOYCE in place, with grey thick output. R side ileostomy with red rubber in place, orangish-yellowish clear output.   EXTREM: WWP, no edema, SCDs in place  NEURO: AOX3.     I&O's Detail    13 Oct 2023 07:01  -  14 Oct 2023 07:00  --------------------------------------------------------  IN:    Fat Emulsion (Fish Oil &amp; Plant Based) 20% Infusion: 41.6 mL    Fat Emulsion (Fish Oil &amp; Plant Based) 20% Infusion: 544.8 mL    Oral Fluid: 600 mL    TPN (Total Parenteral Nutrition): 3384 mL  Total IN: 4570.4 mL    OUT:    Bulb (mL): 35 mL    Drain (mL): 2300 mL    Drain (mL): 0 mL    Voided (mL): 1990 mL  Total OUT: 4325 mL    Total NET: 245.4 mL      14 Oct 2023 07:01  -  14 Oct 2023 11:12  --------------------------------------------------------  IN:    TPN (Total Parenteral Nutrition): 588 mL  Total IN: 588 mL    OUT:    Drain (mL): 400 mL    Voided (mL): 200 mL  Total OUT: 600 mL    Total NET: -12 mL          LABS:                        8.8    8.59  )-----------( 151      ( 13 Oct 2023 05:30 )             28.4     10-13    137  |  101  |  42<H>  ----------------------------<  117<H>  4.5   |  28  |  1.29    Ca    8.9      13 Oct 2023 05:30  Phos  2.5     10-13  Mg     2.0     10-13    TPro  8.1  /  Alb  2.4<L>  /  TBili  3.8<H>  /  DBili  x   /  AST  69<H>  /  ALT  68<H>  /  AlkPhos  148<H>  10-13      Urinalysis Basic - ( 13 Oct 2023 05:30 )    Color: x / Appearance: x / SG: x / pH: x  Gluc: 117 mg/dL / Ketone: x  / Bili: x / Urobili: x   Blood: x / Protein: x / Nitrite: x   Leuk Esterase: x / RBC: x / WBC x   Sq Epi: x / Non Sq Epi: x / Bacteria: x        RADIOLOGY & ADDITIONAL STUDIES:

## 2023-10-14 NOTE — PROGRESS NOTE ADULT - ASSESSMENT
78 M w/ Crohn's, AFib/Flutter s/p DCCVs on amiodarone, remote ileocectomy and open appendectomy. Admitted (6/23) for SBO vs Crohns flare, s/p NGT decompression and s/p lap TRE converted to open TRE, SBR x 3, left in discontinuity with abthera vac on (6/27), RTOR for ileocolic resection, small bowel anastomosis, and abdominal wall closure on (6/28), c/b fluid collection s/p IR aspiration of perihepatic fluid on (7/3), c/b wound dehiscence s/p RTOR exlap, washout, ileocolic resection with end ileostomy, blow hole colostomy, red rubber from ileostomy to small bowel anastomosis; vicryl bridging mesh on (7/5) transferred to SICU postoperatively for hemodynamic monitoring, with hospital course complicated by periods of AMS most recently on 9/1 and associated with oliguria, severe ELVI and hyponatremia, which resolved but now stepped back up for to SICU on 9/10 for acute AMS, intermittent hypoglycemia, AFib with RVR. Perc Choley tube placed on 9/11 for enlarged GB.     Recommendations:  - Continue to monitor strict I/Os  - C/w TPN  - Wound  today at bedside  - Lab holiday today, next labs tomorrow 10/15  Team 4c will continue to follow, please call with any questions or concerns

## 2023-10-14 NOTE — PROGRESS NOTE ADULT - SUBJECTIVE AND OBJECTIVE BOX
SUBJECTIVE: Patient seen and examined bedside.    heparin   Injectable 5000 Unit(s) SubCutaneous every 8 hours  metoprolol tartrate Injectable 5 milliGRAM(s) IV Push every 6 hours      Vital Signs Last 24 Hrs  T(C): 36.8 (14 Oct 2023 01:00), Max: 36.9 (13 Oct 2023 07:21)  T(F): 98.3 (14 Oct 2023 01:00), Max: 98.4 (13 Oct 2023 07:21)  HR: 94 (13 Oct 2023 21:38) (91 - 101)  BP: 131/61 (13 Oct 2023 21:38) (95/50 - 131/61)  BP(mean): 88 (13 Oct 2023 21:38) (69 - 88)  RR: 26 (13 Oct 2023 21:38) (14 - 26)  SpO2: 100% (13 Oct 2023 21:38) (95% - 100%)    Parameters below as of 13 Oct 2023 16:10  Patient On (Oxygen Delivery Method): room air      I&O's Detail    12 Oct 2023 07:01  -  13 Oct 2023 07:00  --------------------------------------------------------  IN:    Fat Emulsion (Fish Oil &amp; Plant Based) 20% Infusion: 457.6 mL    Oral Fluid: 874 mL    TPN (Total Parenteral Nutrition): 2599 mL  Total IN: 3930.6 mL    OUT:    Bulb (mL): 35 mL    Drain (mL): 90 mL    Drain (mL): 1625 mL    Voided (mL): 1730 mL  Total OUT: 3480 mL    Total NET: 450.6 mL      13 Oct 2023 07:01  -  14 Oct 2023 06:45  --------------------------------------------------------  IN:    Fat Emulsion (Fish Oil &amp; Plant Based) 20% Infusion: 41.6 mL    Fat Emulsion (Fish Oil &amp; Plant Based) 20% Infusion: 544.8 mL    Oral Fluid: 600 mL    TPN (Total Parenteral Nutrition): 3237 mL  Total IN: 4423.4 mL    OUT:    Bulb (mL): 35 mL    Drain (mL): 2300 mL    Drain (mL): 0 mL    Voided (mL): 1990 mL  Total OUT: 4325 mL    Total NET: 98.4 mL          General: NAD, resting comfortably in bed  C/V: NSR  Pulm: Nonlabored breathing, no respiratory distress  Abd: soft, NT/ND.  Extrem: WWP, no edema, SCDs in place        LABS:                        8.8    8.59  )-----------( 151      ( 13 Oct 2023 05:30 )             28.4     10-13    137  |  101  |  42<H>  ----------------------------<  117<H>  4.5   |  28  |  1.29    Ca    8.9      13 Oct 2023 05:30  Phos  2.5     10-13  Mg     2.0     10-13    TPro  8.1  /  Alb  2.4<L>  /  TBili  3.8<H>  /  DBili  x   /  AST  69<H>  /  ALT  68<H>  /  AlkPhos  148<H>  10-13      Urinalysis Basic - ( 13 Oct 2023 05:30 )    Color: x / Appearance: x / SG: x / pH: x  Gluc: 117 mg/dL / Ketone: x  / Bili: x / Urobili: x   Blood: x / Protein: x / Nitrite: x   Leuk Esterase: x / RBC: x / WBC x   Sq Epi: x / Non Sq Epi: x / Bacteria: x        RADIOLOGY & ADDITIONAL STUDIES:   SUBJECTIVE: Patient seen and examined bedside. Patient seen and examined at bedside with chief resident. Patient states that he feels well with no acute complaints.         heparin   Injectable 5000 Unit(s) SubCutaneous every 8 hours  metoprolol tartrate Injectable 5 milliGRAM(s) IV Push every 6 hours      Vital Signs Last 24 Hrs  T(C): 36.8 (14 Oct 2023 01:00), Max: 36.9 (13 Oct 2023 07:21)  T(F): 98.3 (14 Oct 2023 01:00), Max: 98.4 (13 Oct 2023 07:21)  HR: 94 (13 Oct 2023 21:38) (91 - 101)  BP: 131/61 (13 Oct 2023 21:38) (95/50 - 131/61)  BP(mean): 88 (13 Oct 2023 21:38) (69 - 88)  RR: 26 (13 Oct 2023 21:38) (14 - 26)  SpO2: 100% (13 Oct 2023 21:38) (95% - 100%)    Parameters below as of 13 Oct 2023 16:10  Patient On (Oxygen Delivery Method): room air      I&O's Detail    12 Oct 2023 07:01  -  13 Oct 2023 07:00  --------------------------------------------------------  IN:    Fat Emulsion (Fish Oil &amp; Plant Based) 20% Infusion: 457.6 mL    Oral Fluid: 874 mL    TPN (Total Parenteral Nutrition): 2599 mL  Total IN: 3930.6 mL    OUT:    Bulb (mL): 35 mL    Drain (mL): 90 mL    Drain (mL): 1625 mL    Voided (mL): 1730 mL  Total OUT: 3480 mL    Total NET: 450.6 mL      13 Oct 2023 07:01  -  14 Oct 2023 06:45  --------------------------------------------------------  IN:    Fat Emulsion (Fish Oil &amp; Plant Based) 20% Infusion: 41.6 mL    Fat Emulsion (Fish Oil &amp; Plant Based) 20% Infusion: 544.8 mL    Oral Fluid: 600 mL    TPN (Total Parenteral Nutrition): 3237 mL  Total IN: 4423.4 mL    OUT:    Bulb (mL): 35 mL    Drain (mL): 2300 mL    Drain (mL): 0 mL    Voided (mL): 1990 mL  Total OUT: 4325 mL    Total NET: 98.4 mL        PHYSICAL EXAM:  General: NAD, resting comfortably in bed  C/V: NSR  Pulm: Nonlabored breathing, no respiratory distress  Abd: soft, NT/ND, wound manager in place with bilious fluid  Extrem: WWP, no edema, SCDs in place  Drains: LUQ JOYCE in place with scant output          LABS:                        8.8    8.59  )-----------( 151      ( 13 Oct 2023 05:30 )             28.4     10-13    137  |  101  |  42<H>  ----------------------------<  117<H>  4.5   |  28  |  1.29    Ca    8.9      13 Oct 2023 05:30  Phos  2.5     10-13  Mg     2.0     10-13    TPro  8.1  /  Alb  2.4<L>  /  TBili  3.8<H>  /  DBili  x   /  AST  69<H>  /  ALT  68<H>  /  AlkPhos  148<H>  10-13      Urinalysis Basic - ( 13 Oct 2023 05:30 )    Color: x / Appearance: x / SG: x / pH: x  Gluc: 117 mg/dL / Ketone: x  / Bili: x / Urobili: x   Blood: x / Protein: x / Nitrite: x   Leuk Esterase: x / RBC: x / WBC x   Sq Epi: x / Non Sq Epi: x / Bacteria: x        RADIOLOGY & ADDITIONAL STUDIES:

## 2023-10-14 NOTE — PROGRESS NOTE ADULT - SUBJECTIVE AND OBJECTIVE BOX
INCOMPLETE POD#  6/28: ex lap, removal of abthera, ileocolic resection, small bowel anastamosis, , 1.6 crystalloid, 250 5%, 175 uop   7/3: IR Gendel drained perihepatic aspiration of serous fluid.   /: RTOR exlap, washout, ileocolic resection with end ileostomy, blow hole colostomy, fistula, red rubber from ileostomy to small bowel anastomosis; vicryl bridging mesh; R JOYCE below ileostomy, L JOYCE at small bowel enterotomy repair; 500 LR, 500 5% albumin, 3u PRBC, 2 FFP, 400 UOP,   : s/p perc cony  SUBJECTIVE:      MEDICATIONS  (STANDING):  chlorhexidine 2% Cloths 1 Application(s) Topical <User Schedule>  diphenoxylate/atropine 2 Tablet(s) Oral every 6 hours  glucagon  Injectable 1 milliGRAM(s) IntraMuscular once  heparin   Injectable 5000 Unit(s) SubCutaneous every 8 hours  influenza  Vaccine (HIGH DOSE) 0.7 milliLiter(s) IntraMuscular once  levothyroxine Injectable 40 MICROGram(s) IV Push at bedtime  lipid, fat emulsion (Fish Oil and Plant Based) 20% Infusion 1.15 Gm/kG/Day (41.67 mL/Hr) IV Continuous <Continuous>  loperamide 4 milliGRAM(s) Oral every 6 hours  metoprolol tartrate Injectable 5 milliGRAM(s) IV Push every 6 hours  nystatin Powder 1 Application(s) Topical three times a day  opium Tincture 2 milliGRAM(s) Oral every 6 hours  Parenteral Nutrition - Adult 1 Each TPN Continuous <Continuous>  Parenteral Nutrition - Adult 1 Each TPN Continuous <Continuous>    MEDICATIONS  (PRN):  ondansetron Injectable 4 milliGRAM(s) IV Push every 8 hours PRN Nausea and/or Vomiting      Vital Signs Last 24 Hrs  T(C): 36.6 (14 Oct 2023 12:10), Max: 36.8 (13 Oct 2023 18:14)  T(F): 97.8 (14 Oct 2023 12:10), Max: 98.3 (13 Oct 2023 18:14)  HR: 98 (14 Oct 2023 14:11) (92 - 107)  BP: 101/52 (14 Oct 2023 14:11) (90/52 - 131/61)  BP(mean): 71 (14 Oct 2023 14:11) (65 - 88)  RR: 16 (14 Oct 2023 14:11) (16 - 36)  SpO2: 96% (14 Oct 2023 14:11) (95% - 100%)    Parameters below as of 14 Oct 2023 14:11  Patient On (Oxygen Delivery Method): room air        Physical Exam:  General: NAD, resting comfortably in bed  Pulmonary: Nonlabored breathing, no respiratory distress  Cardiovascular: NSR  Abdominal: soft, NT/ND  Extremities: WWP, normal strength  Neuro: A/O x 3, CNs II-XII grossly intact, no focal deficits, normal motor/sensation  Pulses: palpable distal pulses    I&O's Summary    13 Oct 2023 07:01  -  14 Oct 2023 07:00  --------------------------------------------------------  IN: 4570.4 mL / OUT: 4325 mL / NET: 245.4 mL    14 Oct 2023 07:01  -  14 Oct 2023 16:03  --------------------------------------------------------  IN: 2335 mL / OUT: 2280 mL / NET: 55 mL        LABS:                        8.8    8.59  )-----------( 151      ( 13 Oct 2023 05:30 )             28.4     10-14    139  |  106  |  50<H>  ----------------------------<  120<H>  5.2   |  26  |  1.34<H>    Ca    9.1      14 Oct 2023 11:21  Phos  2.3     10-14  Mg     2.3     10-14    TPro  8.1  /  Alb  2.4<L>  /  TBili  3.8<H>  /  DBili  x   /  AST  69<H>  /  ALT  68<H>  /  AlkPhos  148<H>  10-13      Urinalysis Basic - ( 14 Oct 2023 11:41 )    Color: Yellow / Appearance: Clear / S.020 / pH: x  Gluc: x / Ketone: NEGATIVE  / Bili: Small / Urobili: 0.2 E.U./dL   Blood: x / Protein: Trace mg/dL / Nitrite: NEGATIVE   Leuk Esterase: NEGATIVE / RBC: < 5 /HPF / WBC < 5 /HPF   Sq Epi: x / Non Sq Epi: x / Bacteria: Present /HPF      CAPILLARY BLOOD GLUCOSE      POCT Blood Glucose.: 114 mg/dL (13 Oct 2023 23:53)    LIVER FUNCTIONS - ( 13 Oct 2023 05:30 )  Alb: 2.4 g/dL / Pro: 8.1 g/dL / ALK PHOS: 148 U/L / ALT: 68 U/L / AST: 69 U/L / GGT: x             RADIOLOGY & ADDITIONAL STUDIES:   POD#  6/28: ex lap, removal of abthera, ileocolic resection, small bowel anastamosis, , 1.6 crystalloid, 250 5%, 175 uop   7/3: IR Gendel drained perihepatic aspiration of serous fluid.   /: RTOR exlap, washout, ileocolic resection with end ileostomy, blow hole colostomy, fistula, red rubber from ileostomy to small bowel anastomosis; vicryl bridging mesh; R JOYCE below ileostomy, L JOYCE at small bowel enterotomy repair; 500 LR, 500 5% albumin, 3u PRBC, 2 FFP, 400 UOP,   : s/p perc cony  SUBJECTIVE: Patient seen at bedside with chief resident. Patient denies any nausea or shortness of breath or pain.       MEDICATIONS  (STANDING):  chlorhexidine 2% Cloths 1 Application(s) Topical <User Schedule>  diphenoxylate/atropine 2 Tablet(s) Oral every 6 hours  glucagon  Injectable 1 milliGRAM(s) IntraMuscular once  heparin   Injectable 5000 Unit(s) SubCutaneous every 8 hours  influenza  Vaccine (HIGH DOSE) 0.7 milliLiter(s) IntraMuscular once  levothyroxine Injectable 40 MICROGram(s) IV Push at bedtime  lipid, fat emulsion (Fish Oil and Plant Based) 20% Infusion 1.15 Gm/kG/Day (41.67 mL/Hr) IV Continuous <Continuous>  loperamide 4 milliGRAM(s) Oral every 6 hours  metoprolol tartrate Injectable 5 milliGRAM(s) IV Push every 6 hours  nystatin Powder 1 Application(s) Topical three times a day  opium Tincture 2 milliGRAM(s) Oral every 6 hours  Parenteral Nutrition - Adult 1 Each TPN Continuous <Continuous>  Parenteral Nutrition - Adult 1 Each TPN Continuous <Continuous>    MEDICATIONS  (PRN):  ondansetron Injectable 4 milliGRAM(s) IV Push every 8 hours PRN Nausea and/or Vomiting      Vital Signs Last 24 Hrs  T(C): 36.6 (14 Oct 2023 12:10), Max: 36.8 (13 Oct 2023 18:14)  T(F): 97.8 (14 Oct 2023 12:10), Max: 98.3 (13 Oct 2023 18:14)  HR: 98 (14 Oct 2023 14:11) (92 - 107)  BP: 101/52 (14 Oct 2023 14:11) (90/52 - 131/61)  BP(mean): 71 (14 Oct 2023 14:11) (65 - 88)  RR: 16 (14 Oct 2023 14:11) (16 - 36)  SpO2: 96% (14 Oct 2023 14:11) (95% - 100%)    Parameters below as of 14 Oct 2023 14:11  Patient On (Oxygen Delivery Method): room air        Physical Exam:  General: NAD, resting comfortably in bed  Pulmonary: Nonlabored breathing, no respiratory distress  Cardiovascular: NSR  Abdominal: soft, NT/ND, wound ostomy in place   Extremities: WWP, normal strength  Neuro: A/O x 3, CNs II-XII grossly intact, no focal deficits, normal motor/sensation  Pulses: palpable distal pulses    I&O's Summary    13 Oct 2023 07:  -  14 Oct 2023 07:00  --------------------------------------------------------  IN: 4570.4 mL / OUT: 4325 mL / NET: 245.4 mL    14 Oct 2023 07:01  -  14 Oct 2023 16:03  --------------------------------------------------------  IN: 2335 mL / OUT: 2280 mL / NET: 55 mL        LABS:                        8.8    8.59  )-----------( 151      ( 13 Oct 2023 05:30 )             28.4     10-14    139  |  106  |  50<H>  ----------------------------<  120<H>  5.2   |  26  |  1.34<H>    Ca    9.1      14 Oct 2023 11:21  Phos  2.3     10-14  Mg     2.3     10-14    TPro  8.1  /  Alb  2.4<L>  /  TBili  3.8<H>  /  DBili  x   /  AST  69<H>  /  ALT  68<H>  /  AlkPhos  148<H>  10-      Urinalysis Basic - ( 14 Oct 2023 11:41 )    Color: Yellow / Appearance: Clear / S.020 / pH: x  Gluc: x / Ketone: NEGATIVE  / Bili: Small / Urobili: 0.2 E.U./dL   Blood: x / Protein: Trace mg/dL / Nitrite: NEGATIVE   Leuk Esterase: NEGATIVE / RBC: < 5 /HPF / WBC < 5 /HPF   Sq Epi: x / Non Sq Epi: x / Bacteria: Present /HPF      CAPILLARY BLOOD GLUCOSE      POCT Blood Glucose.: 114 mg/dL (13 Oct 2023 23:53)    LIVER FUNCTIONS - ( 13 Oct 2023 05:30 )  Alb: 2.4 g/dL / Pro: 8.1 g/dL / ALK PHOS: 148 U/L / ALT: 68 U/L / AST: 69 U/L / GGT: x             RADIOLOGY & ADDITIONAL STUDIES:

## 2023-10-15 NOTE — PROGRESS NOTE ADULT - SUBJECTIVE AND OBJECTIVE BOX
SUBJECTIVE: VE: Patient seen and examined at bedside with chief resident present. Patient states that he feels well with no acute complaints. Denies N/V/Pain/CP/SOB.          MEDICATIONS  (STANDING):  chlorhexidine 2% Cloths 1 Application(s) Topical <User Schedule>  diphenoxylate/atropine 2 Tablet(s) Oral every 6 hours  glucagon  Injectable 1 milliGRAM(s) IntraMuscular once  heparin   Injectable 5000 Unit(s) SubCutaneous every 8 hours  influenza  Vaccine (HIGH DOSE) 0.7 milliLiter(s) IntraMuscular once  levothyroxine Injectable 40 MICROGram(s) IV Push at bedtime  lipid, fat emulsion (Fish Oil and Plant Based) 20% Infusion 1.15 Gm/kG/Day (41.67 mL/Hr) IV Continuous <Continuous>  lipid, fat emulsion (Fish Oil and Plant Based) 20% Infusion 1.15 Gm/kG/Day (41.67 mL/Hr) IV Continuous <Continuous>  loperamide 4 milliGRAM(s) Oral every 6 hours  metoprolol tartrate Injectable 5 milliGRAM(s) IV Push every 6 hours  nystatin Powder 1 Application(s) Topical three times a day  opium Tincture 2 milliGRAM(s) Oral every 6 hours  Parenteral Nutrition - Adult 1 Each TPN Continuous <Continuous>  Parenteral Nutrition - Adult 1 Each TPN Continuous <Continuous>  sodium phosphate 15 milliMole(s)/250 mL IVPB 15 milliMole(s) IV Intermittent once    MEDICATIONS  (PRN):  ondansetron Injectable 4 milliGRAM(s) IV Push every 8 hours PRN Nausea and/or Vomiting      Vital Signs Last 24 Hrs  T(C): 36.6 (15 Oct 2023 06:20), Max: 36.6 (14 Oct 2023 12:10)  T(F): 97.8 (15 Oct 2023 06:20), Max: 97.8 (14 Oct 2023 12:10)  HR: 89 (15 Oct 2023 08:00) (89 - 100)  BP: 114/64 (15 Oct 2023 08:00) (101/52 - 155/72)  BP(mean): 83 (15 Oct 2023 08:00) (71 - 103)  RR: 19 (15 Oct 2023 07:00) (15 - 25)  SpO2: 98% (15 Oct 2023 08:00) (96% - 100%)    Parameters below as of 15 Oct 2023 08:00  Patient On (Oxygen Delivery Method): room air        PHYSICAL EXAM:  General: NAD, resting comfortably in bed  C/V: NSR  Pulm: Nonlabored breathing, no respiratory distress  Abd: soft, NT/ND, wound manager in place with bilious fluid  Extrem: WWP, no edema, SCDs in place  Drains: LUQ JOYCE in place with scant output    I&O's Detail    14 Oct 2023 07:01  -  15 Oct 2023 07:00  --------------------------------------------------------  IN:    Fat Emulsion (Fish Oil &amp; Plant Based) 20% Infusion: 504 mL    Lactated Ringers Bolus: 1500 mL    Oral Fluid: 720 mL    TPN (Total Parenteral Nutrition): 2721 mL  Total IN: 5445 mL    OUT:    Bulb (mL): 14 mL    Drain (mL): 180 mL    Drain (mL): 1865 mL    Voided (mL): 2150 mL  Total OUT: 4209 mL    Total NET: 1236 mL      15 Oct 2023 07:01  -  15 Oct 2023 08:52  --------------------------------------------------------  IN:    TPN (Total Parenteral Nutrition): 147 mL  Total IN: 147 mL    OUT:  Total OUT: 0 mL    Total NET: 147 mL          LABS:    10-15    140  |  106  |  48<H>  ----------------------------<  118<H>  4.3   |  27  |  1.25    Ca    8.9      15 Oct 2023 05:53  Phos  2.0     10-15  Mg     2.2     10-15    TPro  7.9  /  Alb  2.5<L>  /  TBili  4.3<H>  /  DBili  x   /  AST  80<H>  /  ALT  94<H>  /  AlkPhos  122<H>  10-15      Urinalysis Basic - ( 15 Oct 2023 05:53 )    Color: x / Appearance: x / SG: x / pH: x  Gluc: 118 mg/dL / Ketone: x  / Bili: x / Urobili: x   Blood: x / Protein: x / Nitrite: x   Leuk Esterase: x / RBC: x / WBC x   Sq Epi: x / Non Sq Epi: x / Bacteria: x        RADIOLOGY & ADDITIONAL STUDIES:

## 2023-10-15 NOTE — PROGRESS NOTE ADULT - ATTENDING COMMENTS
Pt seen, no acute events. working on controlling and repleating increased stoma output since starting ensures.

## 2023-10-15 NOTE — PROGRESS NOTE ADULT - SUBJECTIVE AND OBJECTIVE BOX
S: still has significant drainage from fistula, got total 1.5 liters in divided boluses to keep I&O even   no other new med c/o, new events.     O: ICU Vital Signs Last 24 Hrs  T(F): 97.8 (10-15-23 @ 06:20), Max: 97.8 (10-14-23 @ 12:10)  HR: 98 (10-15-23 @ 10:00) (89 - 100)  BP: 125/60 (10-15-23 @ 10:00) (101/52 - 155/72)  BP(mean): 86 (10-15-23 @ 10:00) (71 - 103)  ABP: --  RR: 22 (10-15-23 @ 10:00) (15 - 25)  SpO2: 99% (10-15-23 @ 10:00) (95% - 100%)    PHYSICAL EXAM:   Neurological: AAOx3, CNII-XII intact,  strength 5/5 b/l  ENT: mucus membrane moist  Cardiovascular: RRR  Respiratory: CTA  Gastrointestinal: soft, NT, ND, right sided ostomy bag with grayish output, perc cony tube capped, left sided fistula with salmon colored thickish output, left JOYCE with grayish thick output.   Extremities: warm, no dependent edema  Vascular: no cyanosis/erythema  Skin: no rashes  MSK: no joint swelling.     LABS:    10-15    140  |  106  |  48<H>  ----------------------------<  118<H>  4.3   |  27  |  1.25    Ca    8.9      15 Oct 2023 05:53  Phos  2.0     10-15  Mg     2.2     10-15    TPro  7.9  /  Alb  2.5<L>  /  TBili  4.3<H>  /  DBili  x   /  AST  80<H>  /  ALT  94<H>  /  AlkPhos  122<H>  10-15  LIVER FUNCTIONS - ( 15 Oct 2023 05:53 )  Alb: 2.5 g/dL / Pro: 7.9 g/dL / ALK PHOS: 122 U/L / ALT: 94 U/L / AST: 80 U/L / GGT: x           Urinalysis Basic - ( 15 Oct 2023 05:53 )    Color: x / Appearance: x / SG: x / pH: x  Gluc: 118 mg/dL / Ketone: x  / Bili: x / Urobili: x   Blood: x / Protein: x / Nitrite: x   Leuk Esterase: x / RBC: x / WBC x   Sq Epi: x / Non Sq Epi: x / Bacteria: x    CAPILLARY BLOOD GLUCOSE        MEDICATIONS  (STANDING):  chlorhexidine 2% Cloths 1 Application(s) Topical <User Schedule>  diphenoxylate/atropine 2 Tablet(s) Oral every 6 hours  glucagon  Injectable 1 milliGRAM(s) IntraMuscular once  heparin   Injectable 5000 Unit(s) SubCutaneous every 8 hours  influenza  Vaccine (HIGH DOSE) 0.7 milliLiter(s) IntraMuscular once  levothyroxine Injectable 40 MICROGram(s) IV Push at bedtime  lipid, fat emulsion (Fish Oil and Plant Based) 20% Infusion 1.15 Gm/kG/Day (41.67 mL/Hr) IV Continuous <Continuous>  lipid, fat emulsion (Fish Oil and Plant Based) 20% Infusion 1.15 Gm/kG/Day (41.67 mL/Hr) IV Continuous <Continuous>  loperamide 4 milliGRAM(s) Oral every 6 hours  metoprolol tartrate Injectable 5 milliGRAM(s) IV Push every 6 hours  nystatin Powder 1 Application(s) Topical three times a day  opium Tincture 2 milliGRAM(s) Oral every 6 hours  Parenteral Nutrition - Adult 1 Each TPN Continuous <Continuous>  Parenteral Nutrition - Adult 1 Each TPN Continuous <Continuous>    MEDICATIONS  (PRN):  ondansetron Injectable 4 milliGRAM(s) IV Push every 8 hours PRN Nausea and/or Vomiting      Poole:	  [x ] None	[ ] Daily Poole Order Placed	   Indication:	  [ ] Strict I and O's    [ ] Obstruction     [ ] Incontinence + Stage 3 or 4 Decubitus  Central Line:  [ ] None	   [x ]  Medication / TPN Administration     [ ] No Peripheral IV

## 2023-10-15 NOTE — PROGRESS NOTE ADULT - ASSESSMENT
78 M w/ Crohn's, AFib/Flutter s/p DCCVs on amiodarone, remote ileocectomy and open appendectomy. Admitted (6/23) for SBO vs Crohns flare, s/p NGT decompression and s/p lap TRE converted to open TRE, SBR x 3, left in discontinuity with abthera vac on (6/27), RTOR for ileocolic resection, small bowel anastomosis, and abdominal wall closure on (6/28), c/b fluid collection s/p IR aspiration of perihepatic fluid on (7/3), c/b wound dehiscence s/p RTOR exlap, washout, ileocolic resection with end ileostomy, blow hole colostomy, red rubber from ileostomy to small bowel anastomosis; vicryl bridging mesh on (7/5) transferred to SICU postoperatively for hemodynamic monitoring, with hospital course complicated by periods of AMS most recently on 9/1 and associated with oliguria, severe ELVI and hyponatremia, which resolved but now stepped back up for to SICU on 9/10 for acute AMS, intermittent hypoglycemia, AFib with RVR. Perc Choley tube placed on 9/11 for enlarged GB.     Recommendations:  - Continue to monitor strict I/Os  - C/w TPN      Team 4c will continue to follow, please call with any questions or concerns   78 M w/ Crohn's, AFib/Flutter s/p DCCVs on amiodarone, remote ileocectomy and open appendectomy. Admitted (6/23) for SBO vs Crohns flare, s/p NGT decompression and s/p lap TRE converted to open RTE, SBR x 3, left in discontinuity with abthera vac on (6/27), RTOR for ileocolic resection, small bowel anastomosis, and abdominal wall closure on (6/28), c/b fluid collection s/p IR aspiration of perihepatic fluid on (7/3), c/b wound dehiscence s/p RTOR exlap, washout, ileocolic resection with end ileostomy, blow hole colostomy, red rubber from ileostomy to small bowel anastomosis; vicryl bridging mesh on (7/5) transferred to SICU postoperatively for hemodynamic monitoring, with hospital course complicated by periods of AMS most recently on 9/1 and associated with oliguria, severe ELVI and hyponatremia, which resolved but now stepped back up for to SICU on 9/10 for acute AMS, intermittent hypoglycemia, AFib with RVR. Perc Choley tube placed on 9/11 for enlarged GB.     Recommendations:  - Continue to monitor strict I/Os & creatinine   - C/w TPN        Team 4c will continue to follow, please call with any questions or concerns

## 2023-10-15 NOTE — PROGRESS NOTE ADULT - SUBJECTIVE AND OBJECTIVE BOX
SUBJECTIVE: Patient seen and examined at bedside. Patient states that he is feeling well with no acute complaints at this time. He denies nausea, vomiting, chest pain, and shortness of breath.      heparin   Injectable 5000 Unit(s) SubCutaneous every 8 hours  metoprolol tartrate Injectable 5 milliGRAM(s) IV Push every 6 hours      Vital Signs Last 24 Hrs  T(C): 36.6 (15 Oct 2023 10:00), Max: 36.6 (15 Oct 2023 06:20)  T(F): 97.8 (15 Oct 2023 10:00), Max: 97.8 (15 Oct 2023 06:20)  HR: 98 (15 Oct 2023 10:00) (89 - 100)  BP: 125/60 (15 Oct 2023 10:00) (101/52 - 155/72)  BP(mean): 86 (15 Oct 2023 10:00) (71 - 103)  RR: 22 (15 Oct 2023 10:00) (15 - 25)  SpO2: 99% (15 Oct 2023 10:00) (95% - 100%)    Parameters below as of 15 Oct 2023 10:00  Patient On (Oxygen Delivery Method): room air      I&O's Detail    14 Oct 2023 07:01  -  15 Oct 2023 07:00  --------------------------------------------------------  IN:    Fat Emulsion (Fish Oil &amp; Plant Based) 20% Infusion: 504 mL    Lactated Ringers Bolus: 1500 mL    Oral Fluid: 720 mL    TPN (Total Parenteral Nutrition): 2721 mL  Total IN: 5445 mL    OUT:    Bulb (mL): 14 mL    Drain (mL): 180 mL    Drain (mL): 1865 mL    Voided (mL): 2150 mL  Total OUT: 4209 mL    Total NET: 1236 mL      15 Oct 2023 07:01  -  15 Oct 2023 12:32  --------------------------------------------------------  IN:    IV PiggyBack: 125 mL    Oral Fluid: 200 mL    TPN (Total Parenteral Nutrition): 735 mL  Total IN: 1060 mL    OUT:    Bulb (mL): 0 mL    Drain (mL): 0 mL    Drain (mL): 790 mL    Voided (mL): 690 mL  Total OUT: 1480 mL    Total NET: -420 mL          General: NAD, resting comfortably in bed  C/V: NSR  Pulm: Nonlabored breathing, no respiratory distress  Abd: soft, NT/ND, wound manager in place with bilious fluid  Extrem: WWP, no edema, SCDs in place      LABS:    10-15    140  |  106  |  48<H>  ----------------------------<  118<H>  4.3   |  27  |  1.25    Ca    8.9      15 Oct 2023 05:53  Phos  2.0     10-15  Mg     2.2     10-15    TPro  7.9  /  Alb  2.5<L>  /  TBili  4.3<H>  /  DBili  x   /  AST  80<H>  /  ALT  94<H>  /  AlkPhos  122<H>  10-15      Urinalysis Basic - ( 15 Oct 2023 05:53 )    Color: x / Appearance: x / SG: x / pH: x  Gluc: 118 mg/dL / Ketone: x  / Bili: x / Urobili: x   Blood: x / Protein: x / Nitrite: x   Leuk Esterase: x / RBC: x / WBC x   Sq Epi: x / Non Sq Epi: x / Bacteria: x        RADIOLOGY & ADDITIONAL STUDIES:

## 2023-10-15 NOTE — PROGRESS NOTE ADULT - ATTENDING COMMENTS
needed IVF yesterday in the setting of Omar and high ostomy output. started tincture of opium. renal function improved. ostomy output slightly better. rest as above.

## 2023-10-15 NOTE — PROGRESS NOTE ADULT - ASSESSMENT
78 M w/ Crohn's, AFib/Flutter s/p DCCVs on amiodarone, remote ileocectomy and open appendectomy. Admitted (6/23) for SBO vs Crohns flare, s/p NGT decompression and s/p lap TRE converted to open TRE, SBR x 3, left in discontinuity with abthera vac on (6/27), RTOR for ileocolic resection, small bowel anastomosis, and abdominal wall closure on (6/28), c/b fluid collection s/p IR aspiration of perihepatic fluid on (7/3), c/b wound dehiscence s/p RTOR exlap, washout, ileocolic resection with end ileostomy, blow hole colostomy, red rubber from ileostomy to small bowel anastomosis; vicryl bridging mesh on (7/5) transferred to SICU postoperatively for hemodynamic monitoring, with hospital course complicated by periods of AMS most recently on 9/1 and associated with oliguria, severe ELVI and hyponatremia, which resolved but now stepped back up for to SICU on 9/10 for acute AMS, intermittent hypoglycemia, AFib with RVR. Perc Choley tube placed on 9/11 for enlarged GB.     Recommendations:  - Continue to monitor strict I/Os  - C/w TPN  Team 4c will continue to follow, please call with any questions or concerns   78 M w/ Crohn's, AFib/Flutter s/p DCCVs on amiodarone, remote ileocectomy and open appendectomy. Admitted (6/23) for SBO vs Crohns flare, s/p NGT decompression and s/p lap TRE converted to open TRE, SBR x 3, left in discontinuity with abthera vac on (6/27), RTOR for ileocolic resection, small bowel anastomosis, and abdominal wall closure on (6/28), c/b fluid collection s/p IR aspiration of perihepatic fluid on (7/3), c/b wound dehiscence s/p RTOR exlap, washout, ileocolic resection with end ileostomy, blow hole colostomy, red rubber from ileostomy to small bowel anastomosis; vicryl bridging mesh on (7/5) transferred to SICU postoperatively for hemodynamic monitoring, with hospital course complicated by periods of AMS most recently on 9/1 and associated with oliguria, severe ELVI and hyponatremia, which resolved but now stepped back up for to SICU on 9/10 for acute AMS, intermittent hypoglycemia, AFib with RVR. Perc Choley tube placed on 9/11 for enlarged GB.     Recommendations:  - Continue to monitor strict I/Os  - Monitor creatinine   - C/w TPN  Team 4c will continue to follow, please call with any questions or concerns

## 2023-10-15 NOTE — PROGRESS NOTE ADULT - ASSESSMENT
78 M w/ Crohn's, AFib/Flutter s/p DCCVs on amiodarone, remote ileocectomy and open appendectomy. Admitted (6/23) for SBO vs Crohns flare, s/p NGT decompression and s/p lap TRE converted to open TRE, SBR x 3, left in discontinuity with abthera vac on (6/27), RTOR for ileocolic resection, small bowel anastomosis, and abdominal wall closure on (6/28), c/b fluid collection s/p IR aspiration of perihepatic fluid on (7/3), c/b wound dehiscence s/p RTOR exlap, washout, ileocolic resection with end ileostomy, blow hole colostomy, red rubber from ileostomy to small bowel anastomosis; vicryl bridging mesh on (7/5) transferred to SICU postoperatively for hemodynamic monitoring, with hospital course complicated by periods of AMS most recently on 9/1 and associated with oliguria, severe ELVI and hyponatremia, which resolved but now stepped back up for to SICU on 9/10 for acute AMS, intermittent hypoglycemia, AFib with RVR. Perc Choley tube placed on 9/11 for enlarged GB, capped 10/5    NEURO: AMS (resolved), EEG neg. Hx of depression - Effexor Dc'd on 9/10 due to delirium. Cont Melatonin prn Insomnia, Cont Zofran   HENT: Cepacol  CV: Hx Afib/flutter: s/p DCCV, Off amiodarone given transaminitis; Continue Metoprolol 5q6. Hx HLD: Lipitor Held given high LFTs. TTE  (7/18) - PASP 64mmHg, EF 65-70%,  Hx HTN -  holding Norvasc 10qd. Holding Losartan  Cont Lopressor 5 q6.   PULM: no resp distress on room air.   GI/FEN: Cont Ensure x 3/day. Cont TPN (2.5liters/day volume) for high ostomy and ECF output: Cont Octreotide in TPN, Imodium, and Lomotil. tincture of opium added yesterday. Transaminitis of unknown origin, now stable. s/p Perc Choley on 9/11 - tube capped 10/5. PPI. significant drainge from fistula,  bolus as needed to keep I&O even.   : Voids, S/p ELVI resolved.  ENDO: No need to check ISS. Hx hypothyroid: IV Synthroid, TSH wnl on 9/10  ID: Numerous SICU admissions for sepsis. Currently off abx. Cont Nystatin powder // PREVIOUSLY had C. tertium, Lactobacillis from his IR cx 7/3, and candida albicans, lactobacillus, vanc sensitive E faecium, vanc resistant E gallinarum, vanc resistant E casseliflavus, lactobacillus from his OR cx 7/5. Completed course of abx with Imipenem (9/10-9/15). Imipenem (8/26--) imipenem (6/30-7/12, 7/23-7/30), Dapto (6/30-7/5 and 7/23-7/24).  empiric dapto (8/23-24) and cefepime (8/23-24).   PPX: SCDs, SQH  LINES: PIVs, LUE PICC (9/1- )  WOUNDS/DRAINS: Abdominal wound and fistula with wound manager in place dressing change M &F. Rt LQ Ostomy. JOYCE.   PT: Ambulate as tolerated   DISPO: SICU due to complicated hospital course. but will do vitals q4hrs (hold overnight), and Labs MWF

## 2023-10-16 NOTE — PROGRESS NOTE ADULT - NS ATTEND AMEND GEN_ALL_CORE FT
78 year old male with hx crohn's, atrial fibrillation s/p SBR for SBO with ileocolic resection and ileostomy, on TPN.  perc cony tube capped. Increased gut output. Trialling  small volume of ensure. Close monitoring of output. Creatinine stable today. Continue to monitor. Surgery following

## 2023-10-16 NOTE — PROGRESS NOTE ADULT - ASSESSMENT
78 M w/ Crohn's, AFib/Flutter s/p DCCVs on amiodarone, remote ileocectomy and open appendectomy. Admitted (6/23) for SBO vs Crohns flare, s/p NGT decompression and s/p lap TRE converted to open TRE, SBR x 3, left in discontinuity with abthera vac on (6/27), RTOR for ileocolic resection, small bowel anastomosis, and abdominal wall closure on (6/28), c/b fluid collection s/p IR aspiration of perihepatic fluid on (7/3), c/b wound dehiscence s/p RTOR exlap, washout, ileocolic resection with end ileostomy, blow hole colostomy, red rubber from ileostomy to small bowel anastomosis; vicryl bridging mesh on (7/5) transferred to SICU postoperatively for hemodynamic monitoring, with hospital course complicated by periods of AMS most recently on 9/1 and associated with oliguria, severe ELVI and hyponatremia, which resolved but now stepped back up for to SICU on 9/10 for acute AMS, intermittent hypoglycemia, AFib with RVR. Perc Choley tube placed on 9/11 for enlarged GB.    Recommendations:  Max opium tincture  Continue TPN  Continue max 2-3 ensures/day  Encourage OOB/ambulation  Continue: octreotide, lomotil, imodium  Continue wound care evaluation   Rest of care per SICU team    Team 5c will continue to follow

## 2023-10-16 NOTE — PROGRESS NOTE ADULT - ASSESSMENT
78 M w/ Crohn's, AFib/Flutter s/p DCCVs on amiodarone, remote ileocectomy and open appendectomy. Admitted (6/23) for SBO vs Crohns flare, s/p NGT decompression and s/p lap TRE converted to open TRE, SBR x 3, left in discontinuity with abthera vac on (6/27), RTOR for ileocolic resection, small bowel anastomosis, and abdominal wall closure on (6/28), c/b fluid collection s/p IR aspiration of perihepatic fluid on (7/3), c/b wound dehiscence s/p RTOR exlap, washout, ileocolic resection with end ileostomy, blow hole colostomy, red rubber from ileostomy to small bowel anastomosis; vicryl bridging mesh on (7/5) transferred to SICU postoperatively for hemodynamic monitoring, with hospital course complicated by periods of AMS most recently on 9/1 and associated with oliguria, severe ELVI and hyponatremia, which resolved but now stepped back up for to SICU on 9/10 for acute AMS, intermittent hypoglycemia, AFib with RVR. Perc Choley tube placed on 9/11 for enlarged GB, capped 10/5    NEURO: AMS (resolved), EEG neg. Hx of depression - Effexor Dc'd on 9/10 due to delirium. Cont Melatonin prn Insomnia, Cont Zofran   HENT: Cepacol  CV: Hx Afib/flutter: s/p DCCV, Off amiodarone given transaminitis; Continue Metoprolol 5q6. Hx HLD: Lipitor Held given high LFTs. TTE  (7/18) - PASP 64mmHg, EF 65-70%,  Hx HTN -  holding Norvasc 10qd. Holding Losartan  Cont Lopressor 5 q6.   PULM: no resp distress on room air.   GI/FEN: Cont Ensure x 3/day. Cont TPN (2.5liters/day volume) for high ostomy and ECF output: Cont Octreotide in TPN, Imodium, and Lomotil. tincture of opium. Transaminitis of unknown origin, now stable. s/p Perc Choley on 9/11 - tube capped 10/5. PPI. significant drainge from fistula,  bolus as needed to keep I&O even.   : Voids, S/p ELVI resolved.  ENDO: No need to check ISS. Hx hypothyroid: IV Synthroid, TSH wnl on 9/10  ID: Numerous SICU admissions for sepsis. Currently off abx. Cont Nystatin powder // PREVIOUSLY had C. tertium, Lactobacillis from his IR cx 7/3, and candida albicans, lactobacillus, vanc sensitive E faecium, vanc resistant E gallinarum, vanc resistant E casseliflavus, lactobacillus from his OR cx 7/5. Completed course of abx with Imipenem (9/10-9/15). Imipenem (8/26--) imipenem (6/30-7/12, 7/23-7/30), Dapto (6/30-7/5 and 7/23-7/24).  empiric dapto (8/23-24) and cefepime (8/23-24).   PPX: SCDs, SQH  LINES: PIVs, LUE PICC (9/1- )  WOUNDS/DRAINS: Abdominal wound and fistula with wound manager in place dressing change M &F. Rt LQ Ostomy. JOYCE.   PT: Ambulate as tolerated   DISPO: SICU due to complicated hospital course. but will do vitals q4hrs (hold overnight), and CBC MWF, BMP daily in setting of high fistula output

## 2023-10-16 NOTE — PROGRESS NOTE ADULT - ASSESSMENT
78 M w/ Crohn's, AFib/Flutter s/p DCCVs on amiodarone, remote ileocectomy and open appendectomy. Admitted (6/23) for SBO vs Crohns flare, s/p NGT decompression and s/p lap TRE converted to open TRE, SBR x 3, left in discontinuity with abthera vac on (6/27), RTOR for ileocolic resection, small bowel anastomosis, and abdominal wall closure on (6/28), c/b fluid collection s/p IR aspiration of perihepatic fluid on (7/3), c/b wound dehiscence s/p RTOR exlap, washout, ileocolic resection with end ileostomy, blow hole colostomy, red rubber from ileostomy to small bowel anastomosis; vicryl bridging mesh on (7/5) transferred to SICU postoperatively for hemodynamic monitoring, with hospital course complicated by periods of AMS most recently on 9/1 and associated with oliguria, severe ELVI and hyponatremia, which resolved but now stepped back up for to SICU on 9/10 for acute AMS, intermittent hypoglycemia, AFib with RVR. Perc Choley tube placed on 9/11 for enlarged GB, capped 10/5.    Recommendations:  - c/w monitoring outputs closely, replete as tolerated  - C/w TPN  Team 4c will continue to follow, please call with any questions or concerns  Plan discussed with attending, Dr. Knapp

## 2023-10-16 NOTE — PROGRESS NOTE ADULT - SUBJECTIVE AND OBJECTIVE BOX
S: No new issues/events overnight, no new med c/o    O: ICU Vital Signs Last 24 Hrs  T(F): 98.7 (10-16-23 @ 07:30), Max: 98.7 (10-16-23 @ 07:30)  HR: 96 (10-16-23 @ 11:00) (84 - 103)  BP: 118/56 (10-16-23 @ 11:00) (96/55 - 139/60)  BP(mean): 80 (10-16-23 @ 11:00) (68 - 92)  ABP: --  RR: 26 (10-16-23 @ 11:00) (11 - 31)  SpO2: 90% (10-16-23 @ 11:00) (90% - 100%)    PHYSICAL EXAM:   Neurological: AAOx3, CNII-XII intact,  strength 5/5 b/l  ENT: mucus membrane moist  Cardiovascular: RRR  Respiratory: CTA  Gastrointestinal: soft, NT, ND, right sided ostomy bag with grayish output, perc cony tube capped, left sided fistula dark greenish output, left JOYCE with grayish thick output.   Extremities: warm, no dependent edema  Vascular: no cyanosis/erythema  Skin: no rashes  MSK: no joint swelling.         LABS:    10-16    140  |  105  |  42<H>  ----------------------------<  100<H>  4.2   |  27  |  1.12    Ca    8.5      16 Oct 2023 05:30  Phos  4.0     10-16  Mg     2.0     10-16    TPro  7.5  /  Alb  2.3<L>  /  TBili  3.7<H>  /  DBili  x   /  AST  77<H>  /  ALT  91<H>  /  AlkPhos  117  10-16  LIVER FUNCTIONS - ( 16 Oct 2023 05:30 )  Alb: 2.3 g/dL / Pro: 7.5 g/dL / ALK PHOS: 117 U/L / ALT: 91 U/L / AST: 77 U/L / GGT: x                               7.8    7.04  )-----------( 161      ( 16 Oct 2023 05:30 )             25.6     Urinalysis Basic - ( 16 Oct 2023 05:30 )    Color: x / Appearance: x / SG: x / pH: x  Gluc: 100 mg/dL / Ketone: x  / Bili: x / Urobili: x   Blood: x / Protein: x / Nitrite: x   Leuk Esterase: x / RBC: x / WBC x   Sq Epi: x / Non Sq Epi: x / Bacteria: x    CAPILLARY BLOOD GLUCOSE        MEDICATIONS  (STANDING):  chlorhexidine 2% Cloths 1 Application(s) Topical <User Schedule>  diphenoxylate/atropine 2 Tablet(s) Oral every 6 hours  glucagon  Injectable 1 milliGRAM(s) IntraMuscular once  heparin   Injectable 5000 Unit(s) SubCutaneous every 8 hours  influenza  Vaccine (HIGH DOSE) 0.7 milliLiter(s) IntraMuscular once  levothyroxine Injectable 40 MICROGram(s) IV Push at bedtime  lipid, fat emulsion (Fish Oil and Plant Based) 20% Infusion 1.15 Gm/kG/Day (41.67 mL/Hr) IV Continuous <Continuous>  loperamide 4 milliGRAM(s) Oral every 6 hours  metoprolol tartrate Injectable 5 milliGRAM(s) IV Push every 6 hours  nystatin Powder 1 Application(s) Topical three times a day  opium Tincture 2 milliGRAM(s) Oral every 6 hours  Parenteral Nutrition - Adult 1 Each TPN Continuous <Continuous>  Parenteral Nutrition - Adult 1 Each TPN Continuous <Continuous>    MEDICATIONS  (PRN):  ondansetron Injectable 4 milliGRAM(s) IV Push every 8 hours PRN Nausea and/or Vomiting      Poole:	  [x ] None	[ ] Daily Poole Order Placed	   Indication:	  [ ] Strict I and O's    [ ] Obstruction     [ ] Incontinence + Stage 3 or 4 Decubitus  Central Line:  [ ] None	   [x ]  Medication / TPN Administration     [ ] No Peripheral IV

## 2023-10-16 NOTE — PROGRESS NOTE ADULT - SUBJECTIVE AND OBJECTIVE BOX
INTERVAL HPI/OVERNIGHT EVENTS:  ON: Given 500 cc bolus for goal net even.  STATUS POST:      POST OPERATIVE DAY #:   6/27: Laparoscopic lysis of adhesions, converted to open lysis of adhesions, SBR x 3, temporary abdominal closure, 5L cryst, 1L 5% alb, 3 pRBC, 1 FFP, 1 plts  6/28: ex lap, removal of abthera, ileocolic resection, small bowel anastamosis, , 1.6 crystalloid, 250 5%, 175 uop   7/3: IR Gendel drained perihepatic aspiration of serous fluid.   7/5: RTOR exlap, washout, ileocolic resection with end ileostomy, blow hole colostomy, fistula, red rubber from ileostomy to small bowel anastomosis; vicryl bridging mesh; R JOYCE below ileostomy, L JOYCE at small bowel enterotomy repair; 500 LR, 500 5% albumin, 3u PRBC, 2 FFP, 400 UOP,   9/11: s/p perc cony    SUBJECTIVE:  Patient seen and examined by chief resident and team on AM rounds. No acute events overnight. Doing well, BUN/Cr improving after boluses given over weekend. Net +1.1L. Voiding adequately. Midline fistula with 365/2075cc output.     MEDICATIONS  (STANDING):  chlorhexidine 2% Cloths 1 Application(s) Topical <User Schedule>  diphenoxylate/atropine 2 Tablet(s) Oral every 6 hours  glucagon  Injectable 1 milliGRAM(s) IntraMuscular once  heparin   Injectable 5000 Unit(s) SubCutaneous every 8 hours  influenza  Vaccine (HIGH DOSE) 0.7 milliLiter(s) IntraMuscular once  levothyroxine Injectable 40 MICROGram(s) IV Push at bedtime  lipid, fat emulsion (Fish Oil and Plant Based) 20% Infusion 1.15 Gm/kG/Day (41.67 mL/Hr) IV Continuous <Continuous>  loperamide 4 milliGRAM(s) Oral every 6 hours  metoprolol tartrate Injectable 5 milliGRAM(s) IV Push every 6 hours  nystatin Powder 1 Application(s) Topical three times a day  opium Tincture 2 milliGRAM(s) Oral every 6 hours  Parenteral Nutrition - Adult 1 Each TPN Continuous <Continuous>    MEDICATIONS  (PRN):  ondansetron Injectable 4 milliGRAM(s) IV Push every 8 hours PRN Nausea and/or Vomiting      Vital Signs Last 24 Hrs  T(C): 36.6 (16 Oct 2023 05:31), Max: 36.9 (16 Oct 2023 00:53)  T(F): 97.9 (16 Oct 2023 05:31), Max: 98.4 (16 Oct 2023 00:53)  HR: 97 (16 Oct 2023 07:00) (84 - 98)  BP: 99/54 (16 Oct 2023 07:00) (97/52 - 132/65)  BP(mean): 74 (16 Oct 2023 07:00) (69 - 92)  RR: 20 (16 Oct 2023 07:00) (11 - 31)  SpO2: 97% (16 Oct 2023 07:00) (95% - 100%)    Parameters below as of 16 Oct 2023 07:00  Patient On (Oxygen Delivery Method): room air        PHYSICAL EXAM:  GEN: NAD, resting comfortably in bed.   CV: RRR, no m/r/g  PULM: Nonlabored breathing, no respiratory distress. Breathing comfortably on RA.   ABD: Soft, NTND abdomen. Wound manager in place draining bilious fluid. Wound continues to heal appropriately, with granulation tissue overlying wound. LUQ JOYCE in place, with grey thick output. R side ileostomy with red rubber in place, orangish-yellowish clear output.   EXTREM: WWP, no edema, SCDs in place  NEURO: AOX3.         I&O's Detail    15 Oct 2023 07:01  -  16 Oct 2023 07:00  --------------------------------------------------------  IN:    Fat Emulsion (Fish Oil &amp; Plant Based) 20% Infusion: 504 mL    IV PiggyBack: 250 mL    IV PiggyBack: 500 mL    Oral Fluid: 660 mL    TPN (Total Parenteral Nutrition): 3528 mL  Total IN: 5442 mL    OUT:    Bulb (mL): 10 mL    Drain (mL): 30 mL    Drain (mL): 2075 mL    Voided (mL): 2190 mL  Total OUT: 4305 mL    Total NET: 1137 mL          LABS:                        7.8    7.04  )-----------( 161      ( 16 Oct 2023 05:30 )             25.6     10-16    140  |  105  |  42<H>  ----------------------------<  100<H>  4.2   |  27  |  1.12    Ca    8.5      16 Oct 2023 05:30  Phos  4.0     10-16  Mg     2.0     10-16    TPro  7.5  /  Alb  2.3<L>  /  TBili  3.7<H>  /  DBili  x   /  AST  77<H>  /  ALT  91<H>  /  AlkPhos  117  10-16      Urinalysis Basic - ( 16 Oct 2023 05:30 )    Color: x / Appearance: x / SG: x / pH: x  Gluc: 100 mg/dL / Ketone: x  / Bili: x / Urobili: x   Blood: x / Protein: x / Nitrite: x   Leuk Esterase: x / RBC: x / WBC x   Sq Epi: x / Non Sq Epi: x / Bacteria: x        RADIOLOGY & ADDITIONAL STUDIES:

## 2023-10-16 NOTE — CHART NOTE - NSCHARTNOTEFT_GEN_A_CORE
Admitting Diagnosis:   Patient is a 78y old  Male who presents with a chief complaint of SBO (12 Oct 2023 07:26)      PAST MEDICAL & SURGICAL HISTORY:  Essential hypertension  Hypertension      Atrial fibrillation  s/p cardioversion 2010 and 2014  Pt. reports 4 DCCV  Now on Amiodarone 200mg PO bid and Eliquis 5mg PO bid  Last DCCV 4 yrs ago at [facility]      Crohn's disease  s/p partial resection of ileum      Hyperlipidemia      Hypothyroidism      History of depression  On Venlafaxine ER 150mg PO bid      Junctional rhythm      H/O knee surgery      History of cataract surgery          Current Nutrition Order:   PO- Ensure Clear TID; provides 240kcals, 8g protein each  TPN- 411g Dex, 144g AA, 100g lipids; provides 2973.4 kcals, 144g protein, GIR 3.35 mg/kg/min    PO Intake: Good (%) [ X  ]  Fair (50-75%) [   ] Poor (<25%) [   ]- drinks all ensures    GI Issues: ileostomy, fistula with large output, see subjective below    Pain: no pain/discomfort noted    Skin Integrity: abd wound and fistula with wound manager in place, RLQ ostomy, R heel DTI, skin tear below ostomy. Rudy score 18    Labs:   10-16    140  |  105  |  42<H>  ----------------------------<  100<H>  4.2   |  27  |  1.12    Ca    8.5      16 Oct 2023 05:30  Phos  4.0     10-16  Mg     2.0     10-16    TPro  7.5  /  Alb  2.3<L>  /  TBili  3.7<H>  /  DBili  x   /  AST  77<H>  /  ALT  91<H>  /  AlkPhos  117  10-16    CAPILLARY BLOOD GLUCOSE          Medications:  MEDICATIONS  (STANDING):  chlorhexidine 2% Cloths 1 Application(s) Topical <User Schedule>  diphenoxylate/atropine 2 Tablet(s) Oral every 6 hours  glucagon  Injectable 1 milliGRAM(s) IntraMuscular once  heparin   Injectable 5000 Unit(s) SubCutaneous every 8 hours  influenza  Vaccine (HIGH DOSE) 0.7 milliLiter(s) IntraMuscular once  levothyroxine Injectable 40 MICROGram(s) IV Push at bedtime  lipid, fat emulsion (Fish Oil and Plant Based) 20% Infusion 1.15 Gm/kG/Day (41.67 mL/Hr) IV Continuous <Continuous>  loperamide 4 milliGRAM(s) Oral every 6 hours  metoprolol tartrate Injectable 5 milliGRAM(s) IV Push every 6 hours  nystatin Powder 1 Application(s) Topical three times a day  opium Tincture 2 milliGRAM(s) Oral every 6 hours  Parenteral Nutrition - Adult 1 Each TPN Continuous <Continuous>  Parenteral Nutrition - Adult 1 Each TPN Continuous <Continuous>    MEDICATIONS  (PRN):  ondansetron Injectable 4 milliGRAM(s) IV Push every 8 hours PRN Nausea and/or Vomiting      Weight: 85.2kg [16 Oct 2023]  Daily   95.2kg [12 Oct 2023]   Daily 87.7kg [11 Oct 2023]  Daily 87.4kg [09 Oct 2023]  Daily 86.4kg [07 Oct 2023]  Daily 82.5kg [06 Oct 2023]  Daily 82.6kg [4 Oct 2023]   Daily 83.5kg [03 Oct 2023]  Daily 84.5kg [2 Oct 2023]  Daily 87.2kg [1 Oct 2023]  Daily 88.3kg [29 Sep 2023]  Daily 89.9kg [28 Sep 2023]  Daily 87.7kg [24 Sep 2023]  Daily 90.4kg [21 Sep 2023]  Daily 91.4kg [20 Sep 2023]  Daily 86.7kg [18 Sep 2023]  Daily 88.1kg [16 Sep 2023]  Daily 88.9kg [15 Sep 2023]   Daily 89.1 [14 Sep 2023]  Daily 92.9kg [6 Sep 2023]  Daily 91.8kg [27 Aug 2023]  Daily 101kg [9 Aug 2023]  Daily   102.1kg [10 July 2023]  Daily 99.9kg [9 July 2023]    Weight Change:  weight trending down throughout admission. Recommend continue weekly/daily weights for trending & ensuring adequacy of nutrition provision    IBW: 86.4kg   % IBW: 98.6%    Estimated energy needs:   Current body wt [85.2kg] used for energy calculations as pt <100% IBW. Needs estimated for age and adjusted for current clinical status, increased needs for post-op & open abd wound healing    Calories: 9778-8198 kcals based on 30-40 kcals/kg  Protein: 127.8-170.4 g protein based on 1.5-2.0g protein/kg + additional depending on outputs  *Fluid needs per team    Subjective:   78M w/ Crohn's, AFib/Flutter s/p DCCVs on amiodarone, remote ileocectomy and open appy here for SBO vs Crohns flare, s/p NGT decompression and now s/p lap TRE converted to open TRE, SBR x 3, left in discontinuity with abthera vac on 6/27, RTOR for ileocolic resection, small bowel anastomosis, and abdominal wall closure on 6/28, and s/p IR aspiration of perihepatic fluid on 7/3, stepped down to telemetry on 7/4. wound dehiscence on 7/5, RTOR ex-lap, washout, ileocolic resection with end ileostomy, blow hole colostomy, red rubber from ileostomy to small bowel anastomosis; Vicryl bridging mesh on 7/5. Transferred to SICU post op for HD monitoring. Extubated 7/6 and SDU 8/2. Has been recovering on telemetry with ECF management and prolonged TPN. Upgraded to SICU 8/23 for acute delirium and tremors, r/o sepsis vs metabolic encephalopathy. Ammonia levels normal. TPN DC'ed and started on PO diet. Stepped down to 8 Lachman on 8/28.    Chart reviewed. Pt seen at bedside on 5EA, TPN remains primary means to nutrition with supplemental PO Ensure Clears TID [240 kcals, 8g protein each]. Current TPN provision provides 34.9 kcals/kg, 28.1 non-protein kcal/kg, 1.7g protein/kg. Currently cycling TPN over 18 hrs with goal of improving LFTs. Pt denies s/s GI distress. Noted with high output from abd wound- 2075ml x 24hrs, getting fluid bolus prn to maintain I&Os even. Pt noted with concern regarding absorption of PO intake. Diet education provided to pt regarding current nutrition provision. Meds- on opium & imodium, zofran prn, synthroid [administer 30-60 minutes before food; separate at least 4hrs from calcium or iron-containing products or bile acid sequestrants]. Labs- BUN 42 <H>, total bilirubin 3.7 <H>, AST 77 <H>, ALT 91 <H>, prealbumin 12. RDN will continue to monitor, reassess, and intervene as appropriate.     Previous Nutrition Diagnosis:  Increased Nutrient Needs R/T physiological demands for nutrient AEB post-op wound healing    Active [ X  ]  Resolved [   ]    If resolved, new PES:     Goal:  Pt will meet at least 75% of protein & energy needs via most appropriate route for nutrition     Recommendations:  1. TPN via PICC as primary means to nutrition- 411g Dex, 144g AA, 100g Lipids; provides 2973.4 kcals, 144g protein, GIR 3.35 mg/kg/min [provides 34.9 kcals/kg, 1.7g protein/kg, 28.1 non-protein kcals/kg IBW]  - to provide TPN over 18hrs [6pm-12pm]  - MVI, selenium [now increased], zinc in bag  - monitor copper, manganese levels and adjust in bag prn [consider giving 2-3x per week]  2. Recheck copper, zinc, selenium today  3. PO diet per team  - Ensure Clear TID [240 kcals, 8g protein each]  4. Consider fecal fat study to assess absorption  5. Weekly lipid panel   - hold/decrease lipids if TG >400  6. Monitor BMP/Mg/Phos, POC BG  - monitor & replenish lytes outside TPN bag prn  7. Continue close monitoring of clinical course & adjust recommendations prn  8. Monitor feasibility for advancing PO diet  *Discussed with team    Education: nutrition Rx    Risk Level: High [ X  ] Moderate [   ] Low [   ]

## 2023-10-16 NOTE — PROGRESS NOTE ADULT - SUBJECTIVE AND OBJECTIVE BOX
SUBJECTIVE: Patient seen and examined at bedside with chief resident. Patient states that he is feeling well with no acute complaints. He denies nausea and vomiting and is tolerating 2-3 ensures/day      heparin   Injectable 5000 Unit(s) SubCutaneous every 8 hours  metoprolol tartrate Injectable 5 milliGRAM(s) IV Push every 6 hours      Vital Signs Last 24 Hrs  T(C): 36.6 (16 Oct 2023 05:31), Max: 36.9 (16 Oct 2023 00:53)  T(F): 97.9 (16 Oct 2023 05:31), Max: 98.4 (16 Oct 2023 00:53)  HR: 97 (16 Oct 2023 07:00) (84 - 98)  BP: 99/54 (16 Oct 2023 07:00) (97/52 - 132/65)  BP(mean): 74 (16 Oct 2023 07:00) (69 - 92)  RR: 20 (16 Oct 2023 07:00) (11 - 31)  SpO2: 97% (16 Oct 2023 07:00) (95% - 100%)    Parameters below as of 16 Oct 2023 07:00  Patient On (Oxygen Delivery Method): room air      I&O's Detail    15 Oct 2023 07:01  -  16 Oct 2023 07:00  --------------------------------------------------------  IN:    Fat Emulsion (Fish Oil &amp; Plant Based) 20% Infusion: 504 mL    IV PiggyBack: 250 mL    IV PiggyBack: 500 mL    Oral Fluid: 660 mL    TPN (Total Parenteral Nutrition): 3528 mL  Total IN: 5442 mL    OUT:    Bulb (mL): 10 mL    Drain (mL): 30 mL    Drain (mL): 2075 mL    Voided (mL): 2190 mL  Total OUT: 4305 mL    Total NET: 1137 mL          General: NAD, resting comfortably in bed  C/V: NSR  Pulm: Nonlabored breathing, no respiratory distress  Abd: soft, NT/ND, wound manager in place with bilious fluid  Extrem: WWP, no edema, SCDs in place      LABS:                        7.8    7.04  )-----------( 161      ( 16 Oct 2023 05:30 )             25.6     10-16    140  |  105  |  42<H>  ----------------------------<  100<H>  4.2   |  27  |  1.12    Ca    8.5      16 Oct 2023 05:30  Phos  4.0     10-16  Mg     2.0     10-16    TPro  7.5  /  Alb  2.3<L>  /  TBili  3.7<H>  /  DBili  x   /  AST  77<H>  /  ALT  91<H>  /  AlkPhos  117  10-16      Urinalysis Basic - ( 16 Oct 2023 05:30 )    Color: x / Appearance: x / SG: x / pH: x  Gluc: 100 mg/dL / Ketone: x  / Bili: x / Urobili: x   Blood: x / Protein: x / Nitrite: x   Leuk Esterase: x / RBC: x / WBC x   Sq Epi: x / Non Sq Epi: x / Bacteria: x        RADIOLOGY & ADDITIONAL STUDIES:

## 2023-10-17 NOTE — PROGRESS NOTE ADULT - ASSESSMENT
78 M w/ Crohn's, AFib/Flutter s/p DCCVs on amiodarone, remote ileocectomy and open appendectomy. Admitted (6/23) for SBO vs Crohns flare, s/p NGT decompression and s/p lap TRE converted to open TRE, SBR x 3, left in discontinuity with abthera vac on (6/27), RTOR for ileocolic resection, small bowel anastomosis, and abdominal wall closure on (6/28), c/b fluid collection s/p IR aspiration of perihepatic fluid on (7/3), c/b wound dehiscence s/p RTOR exlap, washout, ileocolic resection with end ileostomy, blow hole colostomy, red rubber from ileostomy to small bowel anastomosis; vicryl bridging mesh on (7/5) transferred to SICU postoperatively for hemodynamic monitoring, with hospital course complicated by periods of AMS most recently on 9/1 and associated with oliguria, severe ELVI and hyponatremia, which resolved but now stepped back up for to SICU on 9/10 for acute AMS, intermittent hypoglycemia, AFib with RVR. Perc Choley tube placed on 9/11 for enlarged GB, capped 10/5    NEURO: AMS (resolved), EEG neg. Hx of depression - Effexor Dc'd on 9/10 due to delirium. Cont Melatonin prn Insomnia, Cont Zofran   HENT: Cepacol  CV: Hx Afib/flutter: s/p DCCV, Off amiodarone given transaminitis; Continue Metoprolol 5q6. Hx HLD: Lipitor Held given high LFTs. TTE  (7/18) - PASP 64mmHg, EF 65-70%,  Hx HTN -  holding Norvasc 10qd. Holding Losartan  Cont Lopressor 5 q6.   PULM: no resp distress on room air.   GI/FEN: Cont Ensure x 3/day. Cont TPN (2.5liters/day volume) for high ostomy and ECF output: Cont Octreotide in TPN, Imodium, and Lomotil. tincture of opium. Transaminitis of unknown origin, now stable. s/p Perc Choley on 9/11 - tube capped 10/5. PPI. significant drainge from fistula,  bolus as needed to keep I&O even.   : Voids, S/p ELVI resolved.  ENDO: No need to check ISS. Hx hypothyroid: IV Synthroid, TSH wnl on 9/10  ID: Numerous SICU admissions for sepsis. Currently off abx. Cont Nystatin powder // PREVIOUSLY had C. tertium, Lactobacillis from his IR cx 7/3, and candida albicans, lactobacillus, vanc sensitive E faecium, vanc resistant E gallinarum, vanc resistant E casseliflavus, lactobacillus from his OR cx 7/5. Completed course of abx with Imipenem (9/10-9/15). Imipenem (8/26--) imipenem (6/30-7/12, 7/23-7/30), Dapto (6/30-7/5 and 7/23-7/24).  empiric dapto (8/23-24) and cefepime (8/23-24).   PPX: SCDs, SQH  LINES: PIVs, LUE PICC (9/1- )  WOUNDS/DRAINS: Abdominal wound and fistula with wound manager in place dressing change M &F. Rt LQ Ostomy. JOYCE.   PT: Ambulate as tolerated   DISPO: SICU due to complicated hospital course. but will do vitals q4hrs (hold overnight), and CBC MWF, BMP daily in setting of high fistula output   pt found to have bilat infiltrates on CXR, possible CHF exac vs pna, will treat both, improved w her nasal cpap, lasix, will admit for further treatment.

## 2023-10-17 NOTE — ADVANCED PRACTICE NURSE CONSULT - ASSESSMENT
Small leak from wound manager at 9 o'clock. 1.8L out yesterday. Remains on TPN with Ensure, otherwise NPO.     Abdomen:  10.5 x 7cm with 100% red granulation tissue with new epithelium to edges. Stomatized fistula at 2 o'clock with thin brown effluent. Periwound skin is intact without irritation. RLQ ileostomy stoma retracted, small less than 1 inch ovoid, peristomal skin intact. Stoma intubated with red rubber catheter.    Skin thoroughly cleansed.  Vashe soak applied to wound bed.  Cavilon No Sting Barrier film applied to periwound, Adapt ostomy rings, stoma paste applied around wound and within creases.  Entire area pouched with an [employer] wound manager # [id number].   2 piece 1 3/4" pouch with Adapt ring applied to RLQ Ileostomy.

## 2023-10-17 NOTE — PROGRESS NOTE ADULT - SUBJECTIVE AND OBJECTIVE BOX
SUBJECTIVE: Patient seen and examined at bedside with chief resident. Patient states that he is feeling well with no acute complaints. He endorses tolerating 2 ensures yesterday without nausea or vomiting.      heparin   Injectable 5000 Unit(s) SubCutaneous every 8 hours  metoprolol tartrate Injectable 5 milliGRAM(s) IV Push every 6 hours      Vital Signs Last 24 Hrs  T(C): 36.4 (17 Oct 2023 11:00), Max: 37.6 (17 Oct 2023 01:00)  T(F): 97.5 (17 Oct 2023 11:00), Max: 99.6 (17 Oct 2023 01:00)  HR: 96 (17 Oct 2023 12:00) (94 - 98)  BP: 114/51 (17 Oct 2023 12:00) (90/61 - 141/58)  BP(mean): 75 (17 Oct 2023 12:00) (69 - 85)  RR: 24 (17 Oct 2023 12:00) (14 - 34)  SpO2: 99% (17 Oct 2023 12:00) (94% - 100%)    Parameters below as of 17 Oct 2023 12:00  Patient On (Oxygen Delivery Method): room air      I&O's Detail    16 Oct 2023 07:01  -  17 Oct 2023 07:00  --------------------------------------------------------  IN:    Fat Emulsion (Fish Oil &amp; Plant Based) 20% Infusion: 588 mL    Oral Fluid: 565 mL    TPN (Total Parenteral Nutrition): 3087 mL  Total IN: 4240 mL    OUT:    Bulb (mL): 0 mL    Drain (mL): 75 mL    Drain (mL): 1810 mL    Fat Emulsion (Fish Oil &amp; Plant Based) 20% Infusion: 0 mL    Voided (mL): 2125 mL  Total OUT: 4010 mL    Total NET: 230 mL      17 Oct 2023 07:01  -  17 Oct 2023 13:40  --------------------------------------------------------  IN:    TPN (Total Parenteral Nutrition): 882 mL  Total IN: 882 mL    OUT:    Bulb (mL): 30 mL    Drain (mL): 380 mL    Drain (mL): 0 mL    Fat Emulsion (Fish Oil &amp; Plant Based) 20% Infusion: 0 mL    Voided (mL): 600 mL  Total OUT: 1010 mL    Total NET: -128 mL          General: NAD, resting comfortably in bed  C/V: NSR  Pulm: Nonlabored breathing, no respiratory distress  Abd: soft, NT/ND, midline wound manager in place with biliious output.  Extrem: WWP, no edema, SCDs in place        LABS:                        7.8    7.04  )-----------( 161      ( 16 Oct 2023 05:30 )             25.6     10-17    136  |  103  |  39<H>  ----------------------------<  113<H>  4.5   |  24  |  1.16    Ca    8.4      17 Oct 2023 05:30  Phos  4.4     10-17  Mg     2.1     10-17    TPro  7.5  /  Alb  2.3<L>  /  TBili  3.7<H>  /  DBili  x   /  AST  77<H>  /  ALT  91<H>  /  AlkPhos  117  10-16      Urinalysis Basic - ( 17 Oct 2023 05:30 )    Color: x / Appearance: x / SG: x / pH: x  Gluc: 113 mg/dL / Ketone: x  / Bili: x / Urobili: x   Blood: x / Protein: x / Nitrite: x   Leuk Esterase: x / RBC: x / WBC x   Sq Epi: x / Non Sq Epi: x / Bacteria: x

## 2023-10-17 NOTE — PROGRESS NOTE ADULT - SUBJECTIVE AND OBJECTIVE BOX
S: No new issues/events overnight, no new med c/o    O: ICU Vital Signs Last 24 Hrs  T(F): 97.5 (10-17-23 @ 11:00), Max: 99.6 (10-17-23 @ 01:00)  HR: 83 (10-17-23 @ 16:00) (83 - 98)  BP: 115/59 (10-17-23 @ 16:00) (98/50 - 141/58)  BP(mean): 83 (10-17-23 @ 16:00) (69 - 85)  ABP: --  RR: 18 (10-17-23 @ 16:00) (18 - 34)  SpO2: 98% (10-17-23 @ 16:00) (94% - 100%)    PHYSICAL EXAM:   Neurological: AAOx3, CNII-XII intact,  strength 5/5 b/l  ENT: mucus membrane moist  Cardiovascular: RRR  Respiratory: CTA  Gastrointestinal: soft, NT, ND, BS+  Extremities: warm, no dependent edema  Vascular: no cyanosis/erythema  Skin: no rashes  MSK: no joint swelling.     LABS:    10-17    136  |  103  |  39<H>  ----------------------------<  113<H>  4.5   |  24  |  1.16    Ca    8.4      17 Oct 2023 05:30  Phos  4.4     10-17  Mg     2.1     10-17    TPro  7.5  /  Alb  2.3<L>  /  TBili  3.7<H>  /  DBili  x   /  AST  77<H>  /  ALT  91<H>  /  AlkPhos  117  10-16  LIVER FUNCTIONS - ( 16 Oct 2023 05:30 )  Alb: 2.3 g/dL / Pro: 7.5 g/dL / ALK PHOS: 117 U/L / ALT: 91 U/L / AST: 77 U/L / GGT: x                               7.8    7.04  )-----------( 161      ( 16 Oct 2023 05:30 )             25.6     Urinalysis Basic - ( 17 Oct 2023 05:30 )    Color: x / Appearance: x / SG: x / pH: x  Gluc: 113 mg/dL / Ketone: x  / Bili: x / Urobili: x   Blood: x / Protein: x / Nitrite: x   Leuk Esterase: x / RBC: x / WBC x   Sq Epi: x / Non Sq Epi: x / Bacteria: x    CAPILLARY BLOOD GLUCOSE        MEDICATIONS  (STANDING):  chlorhexidine 2% Cloths 1 Application(s) Topical <User Schedule>  diphenoxylate/atropine 2 Tablet(s) Oral every 6 hours  glucagon  Injectable 1 milliGRAM(s) IntraMuscular once  heparin   Injectable 5000 Unit(s) SubCutaneous every 8 hours  influenza  Vaccine (HIGH DOSE) 0.7 milliLiter(s) IntraMuscular once  levothyroxine Injectable 40 MICROGram(s) IV Push at bedtime  lipid, fat emulsion (Fish Oil and Plant Based) 20% Infusion 1.15 Gm/kG/Day (41.67 mL/Hr) IV Continuous <Continuous>  loperamide 4 milliGRAM(s) Oral every 6 hours  metoprolol tartrate Injectable 5 milliGRAM(s) IV Push every 6 hours  nystatin Powder 1 Application(s) Topical three times a day  opium Tincture 2 milliGRAM(s) Oral every 6 hours  Parenteral Nutrition - Adult 1 Each TPN Continuous <Continuous>  Parenteral Nutrition - Adult 1 Each TPN Continuous <Continuous>    MEDICATIONS  (PRN):  ondansetron Injectable 4 milliGRAM(s) IV Push every 8 hours PRN Nausea and/or Vomiting      Poole:	  [ ] None	[ ] Daily Poole Order Placed	   Indication:	  [ ] Strict I and O's    [ ] Obstruction     [ ] Incontinence + Stage 3 or 4 Decubitus  Central Line:  [ ] None	   [ ]  Medication / TPN Administration     [ ] No Peripheral IV        S: No new issues/events overnight, no new med c/o    O: ICU Vital Signs Last 24 Hrs  T(F): 97.5 (10-17-23 @ 11:00), Max: 99.6 (10-17-23 @ 01:00)  HR: 83 (10-17-23 @ 16:00) (83 - 98)  BP: 115/59 (10-17-23 @ 16:00) (98/50 - 141/58)  BP(mean): 83 (10-17-23 @ 16:00) (69 - 85)  ABP: --  RR: 18 (10-17-23 @ 16:00) (18 - 34)  SpO2: 98% (10-17-23 @ 16:00) (94% - 100%)    PHYSICAL EXAM:   Neurological: AAOx3, CNII-XII intact,  strength 5/5 b/l  ENT: mucus membrane moist  Cardiovascular: RRR  Respiratory: CTA  Gastrointestinal: soft, NT, ND, right sided ostomy bag with grayish output, perc cony tube capped, left sided fistula dark greenish output, left JOYCE with grayish thick output.   Extremities: warm, no dependent edema  Vascular: no cyanosis/erythema  Skin: no rashes  MSK: no joint swelling.       LABS:    10-17    136  |  103  |  39<H>  ----------------------------<  113<H>  4.5   |  24  |  1.16    Ca    8.4      17 Oct 2023 05:30  Phos  4.4     10-17  Mg     2.1     10-17    TPro  7.5  /  Alb  2.3<L>  /  TBili  3.7<H>  /  DBili  x   /  AST  77<H>  /  ALT  91<H>  /  AlkPhos  117  10-16  LIVER FUNCTIONS - ( 16 Oct 2023 05:30 )  Alb: 2.3 g/dL / Pro: 7.5 g/dL / ALK PHOS: 117 U/L / ALT: 91 U/L / AST: 77 U/L / GGT: x                               7.8    7.04  )-----------( 161      ( 16 Oct 2023 05:30 )             25.6     Urinalysis Basic - ( 17 Oct 2023 05:30 )    Color: x / Appearance: x / SG: x / pH: x  Gluc: 113 mg/dL / Ketone: x  / Bili: x / Urobili: x   Blood: x / Protein: x / Nitrite: x   Leuk Esterase: x / RBC: x / WBC x   Sq Epi: x / Non Sq Epi: x / Bacteria: x    CAPILLARY BLOOD GLUCOSE        MEDICATIONS  (STANDING):  chlorhexidine 2% Cloths 1 Application(s) Topical <User Schedule>  diphenoxylate/atropine 2 Tablet(s) Oral every 6 hours  glucagon  Injectable 1 milliGRAM(s) IntraMuscular once  heparin   Injectable 5000 Unit(s) SubCutaneous every 8 hours  influenza  Vaccine (HIGH DOSE) 0.7 milliLiter(s) IntraMuscular once  levothyroxine Injectable 40 MICROGram(s) IV Push at bedtime  lipid, fat emulsion (Fish Oil and Plant Based) 20% Infusion 1.15 Gm/kG/Day (41.67 mL/Hr) IV Continuous <Continuous>  loperamide 4 milliGRAM(s) Oral every 6 hours  metoprolol tartrate Injectable 5 milliGRAM(s) IV Push every 6 hours  nystatin Powder 1 Application(s) Topical three times a day  opium Tincture 2 milliGRAM(s) Oral every 6 hours  Parenteral Nutrition - Adult 1 Each TPN Continuous <Continuous>  Parenteral Nutrition - Adult 1 Each TPN Continuous <Continuous>    MEDICATIONS  (PRN):  ondansetron Injectable 4 milliGRAM(s) IV Push every 8 hours PRN Nausea and/or Vomiting      Poole:	  [x ] None	[ ] Daily Poole Order Placed	   Indication:	  [ ] Strict I and O's    [ ] Obstruction     [ ] Incontinence + Stage 3 or 4 Decubitus  Central Line:  [ ] None	   [x ]  Medication / TPN Administration     [ ] No Peripheral IV

## 2023-10-17 NOTE — PROGRESS NOTE ADULT - ASSESSMENT
78 M w/ Crohn's, AFib/Flutter s/p DCCVs on amiodarone, remote ileocectomy and open appendectomy. Admitted (6/23) for SBO vs Crohns flare, s/p NGT decompression and s/p lap TRE converted to open TRE, SBR x 3, left in discontinuity with abthera vac on (6/27), RTOR for ileocolic resection, small bowel anastomosis, and abdominal wall closure on (6/28), c/b fluid collection s/p IR aspiration of perihepatic fluid on (7/3), c/b wound dehiscence s/p RTOR exlap, washout, ileocolic resection with end ileostomy, blow hole colostomy, red rubber from ileostomy to small bowel anastomosis; vicryl bridging mesh on (7/5) transferred to SICU postoperatively for hemodynamic monitoring, with hospital course complicated by periods of AMS most recently on 9/1 and associated with oliguria, severe ELVI and hyponatremia, which resolved but now stepped back up for to SICU on 9/10 for acute AMS, intermittent hypoglycemia, AFib with RVR. Perc Choley tube placed on 9/11 for enlarged GB.    Recommendations:  Wound  today  Max opium tincture  Continue TPN  Continue max 2-3 ensures/day  Encourage OOB/ambulation  Continue: octreotide, lomotil, imodium  Continue wound care evaluation   Rest of care per SICU team    Team 5c will continue to follow

## 2023-10-17 NOTE — PROGRESS NOTE ADULT - SUBJECTIVE AND OBJECTIVE BOX
INTERVAL HPI/OVERNIGHT EVENTS:  O/N: AMRITA.    STATUS POST:    6/27: Laparoscopic lysis of adhesions, converted to open lysis of adhesions, SBR x 3, temporary abdominal closure, 5L cryst, 1L 5% alb, 3 pRBC, 1 FFP, 1 plts  6/28: ex lap, removal of abthera, ileocolic resection, small bowel anastamosis, , 1.6 crystalloid, 250 5%, 175 uop   7/3: IR Gendel drained perihepatic aspiration of serous fluid.   7/5: RTOR exlap, washout, ileocolic resection with end ileostomy, blow hole colostomy, fistula, red rubber from ileostomy to small bowel anastomosis; vicryl bridging mesh; R JOYCE below ileostomy, L JOYCE at small bowel enterotomy repair; 500 LR, 500 5% albumin, 3u PRBC, 2 FFP, 400 UOP,   9/11: s/p perc cony    SUBJECTIVE:  Patient seen and examined by chief resident and team on AM rounds. No acute complaints at this time.     MEDICATIONS  (STANDING):  chlorhexidine 2% Cloths 1 Application(s) Topical <User Schedule>  diphenoxylate/atropine 2 Tablet(s) Oral every 6 hours  glucagon  Injectable 1 milliGRAM(s) IntraMuscular once  heparin   Injectable 5000 Unit(s) SubCutaneous every 8 hours  influenza  Vaccine (HIGH DOSE) 0.7 milliLiter(s) IntraMuscular once  levothyroxine Injectable 40 MICROGram(s) IV Push at bedtime  lipid, fat emulsion (Fish Oil and Plant Based) 20% Infusion 1.15 Gm/kG/Day (41.67 mL/Hr) IV Continuous <Continuous>  loperamide 4 milliGRAM(s) Oral every 6 hours  metoprolol tartrate Injectable 5 milliGRAM(s) IV Push every 6 hours  nystatin Powder 1 Application(s) Topical three times a day  opium Tincture 2 milliGRAM(s) Oral every 6 hours  Parenteral Nutrition - Adult 1 Each TPN Continuous <Continuous>  Parenteral Nutrition - Adult 1 Each TPN Continuous <Continuous>    MEDICATIONS  (PRN):  ondansetron Injectable 4 milliGRAM(s) IV Push every 8 hours PRN Nausea and/or Vomiting      Vital Signs Last 24 Hrs  T(C): 36.4 (17 Oct 2023 11:00), Max: 37.6 (17 Oct 2023 01:00)  T(F): 97.5 (17 Oct 2023 11:00), Max: 99.6 (17 Oct 2023 01:00)  HR: 96 (17 Oct 2023 12:00) (94 - 98)  BP: 114/51 (17 Oct 2023 12:00) (90/61 - 141/58)  BP(mean): 75 (17 Oct 2023 12:00) (69 - 85)  RR: 24 (17 Oct 2023 12:00) (14 - 34)  SpO2: 99% (17 Oct 2023 12:00) (94% - 100%)    Parameters below as of 17 Oct 2023 12:00  Patient On (Oxygen Delivery Method): room air        PHYSICAL EXAM:  GEN: NAD, resting comfortably in bed.   CV: RRR, no m/r/g  PULM: Nonlabored breathing, no respiratory distress. Breathing comfortably on RA.   ABD: Soft, NTND abdomen. Wound manager in place draining bilious fluid. Wound continues to heal appropriately, with granulation tissue overlying wound. LUQ JOYCE in place, with grey thick output. R side ileostomy with red rubber in place, orangish-yellowish clear output.   EXTREM: WWP, no edema, SCDs in place  NEURO: AOX3.                   I&O's Detail    16 Oct 2023 07:01  -  17 Oct 2023 07:00  --------------------------------------------------------  IN:    Fat Emulsion (Fish Oil &amp; Plant Based) 20% Infusion: 588 mL    Oral Fluid: 565 mL    TPN (Total Parenteral Nutrition): 3087 mL  Total IN: 4240 mL    OUT:    Bulb (mL): 0 mL    Drain (mL): 75 mL    Drain (mL): 1810 mL    Fat Emulsion (Fish Oil &amp; Plant Based) 20% Infusion: 0 mL    Voided (mL): 2125 mL  Total OUT: 4010 mL    Total NET: 230 mL      17 Oct 2023 07:01  -  17 Oct 2023 13:58  --------------------------------------------------------  IN:    TPN (Total Parenteral Nutrition): 882 mL  Total IN: 882 mL    OUT:    Bulb (mL): 30 mL    Drain (mL): 380 mL    Drain (mL): 0 mL    Fat Emulsion (Fish Oil &amp; Plant Based) 20% Infusion: 0 mL    Voided (mL): 600 mL  Total OUT: 1010 mL    Total NET: -128 mL          LABS:                        7.8    7.04  )-----------( 161      ( 16 Oct 2023 05:30 )             25.6     10-17    136  |  103  |  39<H>  ----------------------------<  113<H>  4.5   |  24  |  1.16    Ca    8.4      17 Oct 2023 05:30  Phos  4.4     10-17  Mg     2.1     10-17    TPro  7.5  /  Alb  2.3<L>  /  TBili  3.7<H>  /  DBili  x   /  AST  77<H>  /  ALT  91<H>  /  AlkPhos  117  10-16      Urinalysis Basic - ( 17 Oct 2023 05:30 )    Color: x / Appearance: x / SG: x / pH: x  Gluc: 113 mg/dL / Ketone: x  / Bili: x / Urobili: x   Blood: x / Protein: x / Nitrite: x   Leuk Esterase: x / RBC: x / WBC x   Sq Epi: x / Non Sq Epi: x / Bacteria: x        RADIOLOGY & ADDITIONAL STUDIES:

## 2023-10-18 NOTE — PROGRESS NOTE ADULT - SUBJECTIVE AND OBJECTIVE BOX
INTERVAL HPI/OVERNIGHT EVENTS:  O/N: AMRITA     STATUS POST:    6/27: Laparoscopic lysis of adhesions, converted to open lysis of adhesions, SBR x 3, temporary abdominal closure, 5L cryst, 1L 5% alb, 3 pRBC, 1 FFP, 1 plts  6/28: ex lap, removal of abthera, ileocolic resection, small bowel anastamosis, , 1.6 crystalloid, 250 5%, 175 uop   7/3: IR Gendel drained perihepatic aspiration of serous fluid.   7/5: RTOR exlap, washout, ileocolic resection with end ileostomy, blow hole colostomy, fistula, red rubber from ileostomy to small bowel anastomosis; vicryl bridging mesh; R JOYCE below ileostomy, L JOYCE at small bowel enterotomy repair; 500 LR, 500 5% albumin, 3u PRBC, 2 FFP, 400 UOP,   9/11: s/p perc cony    SUBJECTIVE:  Patient seen and examined by chief resident and team on AM rounds. No acute events complaints this morning, patient did well overnight without issues. Total output from fistula 1L over 24 hrs, significantly decreased from prior.     MEDICATIONS  (STANDING):  chlorhexidine 2% Cloths 1 Application(s) Topical <User Schedule>  cholestyramine Powder (Sugar-Free) 4 Gram(s) Oral daily  diphenoxylate/atropine 2 Tablet(s) Oral every 6 hours  glucagon  Injectable 1 milliGRAM(s) IntraMuscular once  heparin   Injectable 5000 Unit(s) SubCutaneous every 8 hours  influenza  Vaccine (HIGH DOSE) 0.7 milliLiter(s) IntraMuscular once  levothyroxine Injectable 40 MICROGram(s) IV Push at bedtime  lipid, fat emulsion (Fish Oil and Plant Based) 20% Infusion 1.15 Gm/kG/Day (40.8 mL/Hr) IV Continuous <Continuous>  loperamide 4 milliGRAM(s) Oral every 6 hours  metoprolol tartrate Injectable 5 milliGRAM(s) IV Push every 6 hours  nystatin Powder 1 Application(s) Topical three times a day  opium Tincture 2 milliGRAM(s) Oral every 6 hours  Parenteral Nutrition - Adult 1 Each TPN Continuous <Continuous>  Parenteral Nutrition - Adult 1 Each TPN Continuous <Continuous>    MEDICATIONS  (PRN):  ondansetron Injectable 4 milliGRAM(s) IV Push every 8 hours PRN Nausea and/or Vomiting      Vital Signs Last 24 Hrs  T(C): 37.3 (18 Oct 2023 09:00), Max: 37.3 (18 Oct 2023 09:00)  T(F): 99.1 (18 Oct 2023 09:00), Max: 99.1 (18 Oct 2023 09:00)  HR: 81 (18 Oct 2023 12:00) (81 - 96)  BP: 124/62 (18 Oct 2023 12:00) (99/55 - 126/61)  BP(mean): 87 (18 Oct 2023 12:00) (71 - 88)  RR: 16 (18 Oct 2023 12:00) (16 - 41)  SpO2: 97% (18 Oct 2023 12:00) (91% - 100%)    Parameters below as of 18 Oct 2023 12:00  Patient On (Oxygen Delivery Method): room air        PHYSICAL EXAM:  GEN: NAD, resting comfortably in bed.   CV: RRR, no m/r/g  PULM: Nonlabored breathing, no respiratory distress. Breathing comfortably on RA.   ABD: Soft, NTND abdomen. Wound manager in place draining bilious fluid. Wound continues to heal appropriately, with granulation tissue overlying wound. LUQ JOYCE in place, with grey thick output. R side ileostomy with red rubber in place, orangish-yellowish clear output.   EXTREM: WWP, no edema, SCDs in place  NEURO: AOX3.       I&O's Detail    17 Oct 2023 07:01  -  18 Oct 2023 07:00  --------------------------------------------------------  IN:    Fat Emulsion (Fish Oil &amp; Plant Based) 20% Infusion: 583.8 mL    Oral Fluid: 711 mL    TPN (Total Parenteral Nutrition): 2502 mL  Total IN: 3796.8 mL    OUT:    Bulb (mL): 35 mL    Drain (mL): 1000 mL    Drain (mL): 35 mL    Fat Emulsion (Fish Oil &amp; Plant Based) 20% Infusion: 0 mL    Voided (mL): 2450 mL  Total OUT: 3520 mL    Total NET: 276.8 mL      18 Oct 2023 07:01  -  18 Oct 2023 12:25  --------------------------------------------------------  IN:    Oral Fluid: 240 mL    TPN (Total Parenteral Nutrition): 588 mL  Total IN: 828 mL    OUT:    Bulb (mL): 20 mL    Drain (mL): 0 mL    Drain (mL): 680 mL    Fat Emulsion (Fish Oil &amp; Plant Based) 20% Infusion: 0 mL    Voided (mL): 700 mL  Total OUT: 1400 mL    Total NET: -572 mL          LABS:                        8.1    8.81  )-----------( 164      ( 18 Oct 2023 05:30 )             25.5     10-18    135  |  103  |  35<H>  ----------------------------<  127<H>  4.6   |  24  |  1.24    Ca    8.5      18 Oct 2023 05:30  Phos  3.7     10-18  Mg     2.1     10-18    TPro  7.7  /  Alb  2.3<L>  /  TBili  4.0<H>  /  DBili  x   /  AST  66<H>  /  ALT  83<H>  /  AlkPhos  127<H>  10-18      Urinalysis Basic - ( 18 Oct 2023 05:30 )    Color: x / Appearance: x / SG: x / pH: x  Gluc: 127 mg/dL / Ketone: x  / Bili: x / Urobili: x   Blood: x / Protein: x / Nitrite: x   Leuk Esterase: x / RBC: x / WBC x   Sq Epi: x / Non Sq Epi: x / Bacteria: x        RADIOLOGY & ADDITIONAL STUDIES:

## 2023-10-18 NOTE — PROGRESS NOTE ADULT - ASSESSMENT
78 M w/ Crohn's, AFib/Flutter s/p DCCVs on amiodarone, remote ileocectomy and open appendectomy. Admitted (6/23) for SBO vs Crohns flare, s/p NGT decompression and s/p lap TRE converted to open TRE, SBR x 3, left in discontinuity with abthera vac on (6/27), RTOR for ileocolic resection, small bowel anastomosis, and abdominal wall closure on (6/28), c/b fluid collection s/p IR aspiration of perihepatic fluid on (7/3), c/b wound dehiscence s/p RTOR exlap, washout, ileocolic resection with end ileostomy, blow hole colostomy, red rubber from ileostomy to small bowel anastomosis; vicryl bridging mesh on (7/5) transferred to SICU postoperatively for hemodynamic monitoring, with hospital course complicated by periods of AMS most recently on 9/1 and associated with oliguria, severe ELVI and hyponatremia, which resolved but now stepped back up for to SICU on 9/10 for acute AMS, intermittent hypoglycemia, AFib with RVR. Perc Choley tube placed on 9/11 for enlarged GB.    Recommendations:  Max opium tincture  Continue TPN  Continue max 2-3 ensures/day  Encourage OOB/ambulation  Continue: octreotide, lomotil, imodium  Continue wound care evaluation   Rest of care per SICU team    Team 5c will continue to follow   78 M w/ Crohn's, AFib/Flutter s/p DCCVs on amiodarone, remote ileocectomy and open appendectomy. Admitted (6/23) for SBO vs Crohns flare, s/p NGT decompression and s/p lap TRE converted to open TRE, SBR x 3, left in discontinuity with abthera vac on (6/27), RTOR for ileocolic resection, small bowel anastomosis, and abdominal wall closure on (6/28), c/b fluid collection s/p IR aspiration of perihepatic fluid on (7/3), c/b wound dehiscence s/p RTOR exlap, washout, ileocolic resection with end ileostomy, blow hole colostomy, red rubber from ileostomy to small bowel anastomosis; vicryl bridging mesh on (7/5) transferred to SICU postoperatively for hemodynamic monitoring, with hospital course complicated by periods of AMS most recently on 9/1 and associated with oliguria, severe ELVI and hyponatremia, which resolved but now stepped back up for to SICU on 9/10 for acute AMS, intermittent hypoglycemia, AFib with RVR. Perc Choley tube placed on 9/11 for enlarged GB.    Recommendations:  Start Cholestyramine  Max opium tincture  Continue TPN  Continue max 2-3 ensures/day  Encourage OOB/ambulation  Continue: octreotide, lomotil, imodium  Continue wound care evaluation   Rest of care per SICU team    Team 5c will continue to follow

## 2023-10-18 NOTE — PROGRESS NOTE ADULT - SUBJECTIVE AND OBJECTIVE BOX
SUBJECTIVE: Patient seen and examined at bedside with chief resident. Patient states that he is feeling well with no nausea or vomiting and he is tolerating is ensures. His pain is well controllled.       heparin   Injectable 5000 Unit(s) SubCutaneous every 8 hours  metoprolol tartrate Injectable 5 milliGRAM(s) IV Push every 6 hours      Vital Signs Last 24 Hrs  T(C): 37.3 (18 Oct 2023 09:00), Max: 37.3 (18 Oct 2023 09:00)  T(F): 99.1 (18 Oct 2023 09:00), Max: 99.1 (18 Oct 2023 09:00)  HR: 81 (18 Oct 2023 12:00) (81 - 96)  BP: 124/62 (18 Oct 2023 12:00) (99/55 - 126/61)  BP(mean): 87 (18 Oct 2023 12:00) (71 - 88)  RR: 16 (18 Oct 2023 12:00) (16 - 41)  SpO2: 97% (18 Oct 2023 12:00) (91% - 100%)    Parameters below as of 18 Oct 2023 12:00  Patient On (Oxygen Delivery Method): room air      I&O's Detail    17 Oct 2023 07:01  -  18 Oct 2023 07:00  --------------------------------------------------------  IN:    Fat Emulsion (Fish Oil &amp; Plant Based) 20% Infusion: 583.8 mL    Oral Fluid: 711 mL    TPN (Total Parenteral Nutrition): 2502 mL  Total IN: 3796.8 mL    OUT:    Bulb (mL): 35 mL    Drain (mL): 1000 mL    Drain (mL): 35 mL    Fat Emulsion (Fish Oil &amp; Plant Based) 20% Infusion: 0 mL    Voided (mL): 2450 mL  Total OUT: 3520 mL    Total NET: 276.8 mL      18 Oct 2023 07:01  -  18 Oct 2023 13:29  --------------------------------------------------------  IN:    Oral Fluid: 240 mL    TPN (Total Parenteral Nutrition): 588 mL  Total IN: 828 mL    OUT:    Bulb (mL): 20 mL    Drain (mL): 955 mL    Drain (mL): 10 mL    Fat Emulsion (Fish Oil &amp; Plant Based) 20% Infusion: 0 mL    Voided (mL): 700 mL  Total OUT: 1685 mL    Total NET: -857 mL          General: NAD, resting comfortably in bed  C/V: NSR  Pulm: Nonlabored breathing, no respiratory distress  Abd: soft, NT/ND, wound manager in place with bilious output.   Extrem: WWP, no edema, SCDs in place      LABS:                        8.1    8.81  )-----------( 164      ( 18 Oct 2023 05:30 )             25.5     10-18    135  |  103  |  35<H>  ----------------------------<  127<H>  4.6   |  24  |  1.24    Ca    8.5      18 Oct 2023 05:30  Phos  3.7     10-18  Mg     2.1     10-18    TPro  7.7  /  Alb  2.3<L>  /  TBili  4.0<H>  /  DBili  x   /  AST  66<H>  /  ALT  83<H>  /  AlkPhos  127<H>  10-18      Urinalysis Basic - ( 18 Oct 2023 05:30 )    Color: x / Appearance: x / SG: x / pH: x  Gluc: 127 mg/dL / Ketone: x  / Bili: x / Urobili: x   Blood: x / Protein: x / Nitrite: x   Leuk Esterase: x / RBC: x / WBC x   Sq Epi: x / Non Sq Epi: x / Bacteria: x        RADIOLOGY & ADDITIONAL STUDIES:

## 2023-10-18 NOTE — PROGRESS NOTE ADULT - ASSESSMENT
78 M w/ Crohn's, AFib/Flutter s/p DCCVs on amiodarone, remote ileocectomy and open appendectomy. Admitted (6/23) for SBO vs Crohns flare, s/p NGT decompression and s/p lap TRE converted to open TRE, SBR x 3, left in discontinuity with abthera vac on (6/27), RTOR for ileocolic resection, small bowel anastomosis, and abdominal wall closure on (6/28), c/b fluid collection s/p IR aspiration of perihepatic fluid on (7/3), c/b wound dehiscence s/p RTOR exlap, washout, ileocolic resection with end ileostomy, blow hole colostomy, red rubber from ileostomy to small bowel anastomosis; vicryl bridging mesh on (7/5) transferred to SICU postoperatively for hemodynamic monitoring, with hospital course complicated by periods of AMS most recently on 9/1 and associated with oliguria, severe ELVI and hyponatremia, which resolved but stepped back up for to SICU on 9/10 for acute AMS, intermittent hypoglycemia, AFib with RVR. Percutaneous Cholecystostomy placed on 9/11 for enlarged GB, PCT capped 10/5.    Recommendations:  - C/w TPN  - C/w CLD  - monitor strict I/Os, replete as necessary  Team 4c will continue to follow, please call with any questions or concerns

## 2023-10-18 NOTE — PROGRESS NOTE ADULT - SUBJECTIVE AND OBJECTIVE BOX
INTERVAL/OVERNIGHT EVENTS: NAEO. Did not require fluid boluses.    SUBJECTIVE: This AM, doing well without complaints. Tolerating Ensures, no N/V or abd pain.    Neurologic Medications  ondansetron Injectable 4 milliGRAM(s) IV Push every 8 hours PRN Nausea and/or Vomiting  opium Tincture 2 milliGRAM(s) Oral every 6 hours    Cardiovascular Medications  metoprolol tartrate Injectable 5 milliGRAM(s) IV Push every 6 hours    Gastrointestinal Medications  diphenoxylate/atropine 2 Tablet(s) Oral every 6 hours  lipid, fat emulsion (Fish Oil and Plant Based) 20% Infusion 1.15 Gm/kG/Day IV Continuous <Continuous>  loperamide 4 milliGRAM(s) Oral every 6 hours  Parenteral Nutrition - Adult 1 Each TPN Continuous <Continuous>    Hematologic/Oncologic Medications  heparin   Injectable 5000 Unit(s) SubCutaneous every 8 hours  influenza  Vaccine (HIGH DOSE) 0.7 milliLiter(s) IntraMuscular once    Antimicrobial/Immunologic Medications    Endocrine/Metabolic Medications  cholestyramine Powder (Sugar-Free) 4 Gram(s) Oral daily  glucagon  Injectable 1 milliGRAM(s) IntraMuscular once  levothyroxine Injectable 40 MICROGram(s) IV Push at bedtime    Topical/Other Medications  chlorhexidine 2% Cloths 1 Application(s) Topical <User Schedule>  nystatin Powder 1 Application(s) Topical three times a day    MEDICATIONS  (PRN):  ondansetron Injectable 4 milliGRAM(s) IV Push every 8 hours PRN Nausea and/or Vomiting    I&O's Detail    17 Oct 2023 07:01  -  18 Oct 2023 07:00  --------------------------------------------------------  IN:    Fat Emulsion (Fish Oil &amp; Plant Based) 20% Infusion: 583.8 mL    Oral Fluid: 711 mL    TPN (Total Parenteral Nutrition): 2502 mL  Total IN: 3796.8 mL    OUT:    Bulb (mL): 35 mL    Drain (mL): 1000 mL    Drain (mL): 35 mL    Fat Emulsion (Fish Oil &amp; Plant Based) 20% Infusion: 0 mL    Voided (mL): 2450 mL  Total OUT: 3520 mL    Total NET: 276.8 mL    18 Oct 2023 07:01  -  18 Oct 2023 11:13  --------------------------------------------------------  IN:    Oral Fluid: 240 mL    TPN (Total Parenteral Nutrition): 588 mL  Total IN: 828 mL    OUT:    Bulb (mL): 20 mL    Drain (mL): 375 mL    Drain (mL): 0 mL    Fat Emulsion (Fish Oil &amp; Plant Based) 20% Infusion: 0 mL    Voided (mL): 500 mL  Total OUT: 895 mL    Total NET: -67 mL    Vital Signs Last 24 Hrs  T(C): 37.3 (18 Oct 2023 09:00), Max: 37.3 (18 Oct 2023 09:00)  T(F): 99.1 (18 Oct 2023 09:00), Max: 99.1 (18 Oct 2023 09:00)  HR: 95 (18 Oct 2023 08:00) (83 - 96)  BP: 113/53 (18 Oct 2023 08:00) (99/55 - 126/61)  BP(mean): 76 (18 Oct 2023 08:00) (71 - 88)  RR: 25 (18 Oct 2023 08:00) (16 - 41)  SpO2: 95% (18 Oct 2023 08:00) (91% - 100%)    Parameters below as of 18 Oct 2023 08:00  Patient On (Oxygen Delivery Method): room air    Neurological: AAOx3, CNII-XII intact,  strength 5/5 b/l  ENT: mucus membrane moist  Cardiovascular: RRR  Respiratory: CTA  Gastrointestinal: soft, NT, ND, right sided ostomy bag with brown output, perc cony tube capped, left sided fistula dark greenish/brown output, left JOYCE with brown thick output.   Extremities: warm, no dependent edema  Vascular: no cyanosis/erythema  Skin: no rashes  MSK: no joint swelling.       LABS:                        8.1    8.81  )-----------( 164      ( 18 Oct 2023 05:30 )             25.5     10-18    135  |  103  |  35<H>  ----------------------------<  127<H>  4.6   |  24  |  1.24    Ca    8.5      18 Oct 2023 05:30  Phos  3.7     10-18  Mg     2.1     10-18    TPro  7.7  /  Alb  2.3<L>  /  TBili  4.0<H>  /  DBili  x   /  AST  66<H>  /  ALT  83<H>  /  AlkPhos  127<H>  10-18    Urinalysis Basic - ( 18 Oct 2023 05:30 )    Color: x / Appearance: x / SG: x / pH: x  Gluc: 127 mg/dL / Ketone: x  / Bili: x / Urobili: x   Blood: x / Protein: x / Nitrite: x   Leuk Esterase: x / RBC: x / WBC x   Sq Epi: x / Non Sq Epi: x / Bacteria: x

## 2023-10-18 NOTE — PROGRESS NOTE ADULT - ASSESSMENT
78 M w/ Crohn's, AFib/Flutter s/p DCCVs on amiodarone, remote ileocectomy and open appendectomy. Admitted (6/23) for SBO vs Crohns flare, s/p NGT decompression and s/p lap TRE converted to open TRE, SBR x 3, left in discontinuity with abthera vac on (6/27), RTOR for ileocolic resection, small bowel anastomosis, and abdominal wall closure on (6/28), c/b fluid collection s/p IR aspiration of perihepatic fluid on (7/3), c/b wound dehiscence s/p RTOR exlap, washout, ileocolic resection with end ileostomy, blow hole colostomy, red rubber from ileostomy to small bowel anastomosis; vicryl bridging mesh on (7/5) transferred to SICU postoperatively for hemodynamic monitoring, with hospital course complicated by periods of AMS most recently on 9/1 and associated with oliguria, severe ELVI and hyponatremia, which resolved but stepped back up for to SICU on 9/10 for acute AMS, intermittent hypoglycemia, AFib with RVR. Percutaneous Cholecystostomy placed on 9/11 for enlarged GB, PCT capped 10/5.    NEURO: AMS (resolved), EEG neg. Hx of depression - Effexor Dc'd on 9/10 due to delirium. Cont Melatonin prn Insomnia, Cont Zofran   HENT: Cepacol  CV: Hx Afib/flutter: s/p DCCV, Off amiodarone given transaminitis; Continue Metoprolol 5q6. Hx HLD: Lipitor Held given high LFTs. TTE  (7/18) - PASP 64mmHg, EF 65-70%,  Hx HTN -  holding Norvasc 10qd. Holding Losartan  Cont Lopressor 5 q6.   PULM: no resp distress on room air.   GI/FEN: Cont Ensure x 3/day. Cont TPN (2.5liters/day volume) for high ostomy and ECF output: Cont Octreotide in TPN, Imodium, Lomotil, Tincture of Opium. Transaminitis of unknown origin, now stable. s/p Perc Choley on 9/11 - tube capped 10/5. PPI. Significant drainge from fistula,  bolus as needed to keep I&O even. Cholestyramine started 10/18  : Voids, S/p ELVI resolved.  ENDO: No need to check ISS. Hx hypothyroid: IV Synthroid, TSH wnl on 9/10  ID: Numerous SICU admissions for sepsis. Currently off abx. Cont Nystatin powder // PREVIOUSLY had C. tertium, Lactobacillis from his IR cx 7/3, and candida albicans, lactobacillus, vanc sensitive E faecium, vanc resistant E gallinarum, vanc resistant E casseliflavus, lactobacillus from his OR cx 7/5. Completed course of abx with Imipenem (9/10-9/15). Imipenem (8/26--) imipenem (6/30-7/12, 7/23-7/30), Dapto (6/30-7/5 and 7/23-7/24).  empiric dapto (8/23-24) and cefepime (8/23-24).   PPX: SCDs, SQH  LINES: PIVs, LUE PICC (9/1- )  WOUNDS/DRAINS: Abdominal wound and fistula with wound manager in place dressing change M &F. Rt LQ Ostomy. JOYCE.   PT: Ambulate as tolerated   DISPO: SICU due to complicated hospital course. but will do vitals q4hrs (hold overnight), and CBC MWF, BMP daily in setting of high fistula output

## 2023-10-19 NOTE — PROGRESS NOTE ADULT - SUBJECTIVE AND OBJECTIVE BOX
SUBJECTIVE: Patient seen and examined at bedside with chief resident. Patient states that he is feeling well with no nausea or vomiting and he is tolerating is ensures. His pain is well controllled.       heparin   Injectable 5000 Unit(s) SubCutaneous every 8 hours  metoprolol tartrate Injectable 5 milliGRAM(s) IV Push every 6 hours      Vital Signs Last 24 Hrs  T(C): 37.3 (19 Oct 2023 03:16), Max: 37.3 (18 Oct 2023 09:00)  T(F): 99.1 (19 Oct 2023 03:16), Max: 99.1 (18 Oct 2023 09:00)  HR: 95 (19 Oct 2023 06:00) (81 - 95)  BP: 116/55 (19 Oct 2023 06:00) (92/47 - 124/62)  BP(mean): 78 (19 Oct 2023 06:00) (67 - 87)  RR: 22 (19 Oct 2023 06:00) (16 - 25)  SpO2: 98% (19 Oct 2023 06:00) (95% - 100%)    Parameters below as of 19 Oct 2023 06:00  Patient On (Oxygen Delivery Method): room air      I&O's Detail    18 Oct 2023 07:01  -  19 Oct 2023 07:00  --------------------------------------------------------  IN:    Fat Emulsion (Fish Oil &amp; Plant Based) 20% Infusion: 541.5 mL    Oral Fluid: 1196 mL    TPN (Total Parenteral Nutrition): 2649 mL  Total IN: 4386.5 mL    OUT:    Bulb (mL): 27 mL    Drain (mL): 42 mL    Drain (mL): 1955 mL    Fat Emulsion (Fish Oil &amp; Plant Based) 20% Infusion: 0 mL    Voided (mL): 2035 mL  Total OUT: 4059 mL    Total NET: 327.5 mL          General: NAD, resting comfortably in bed  C/V: NSR  Pulm: Nonlabored breathing, no respiratory distress  Abd: soft, NT/ND, wound manager in place with bilious output, looser than yesterday  Extrem: WWP, no edema, SCDs in place      LABS:                        8.1    8.81  )-----------( 164      ( 18 Oct 2023 05:30 )             25.5     10-19    134<L>  |  103  |  39<H>  ----------------------------<  127<H>  4.3   |  22  |  1.22    Ca    8.6      19 Oct 2023 05:30  Phos  3.3     10-19  Mg     2.0     10-19    TPro  7.8  /  Alb  2.4<L>  /  TBili  4.0<H>  /  DBili  x   /  AST  66<H>  /  ALT  79<H>  /  AlkPhos  128<H>  10-19      Urinalysis Basic - ( 19 Oct 2023 05:30 )    Color: x / Appearance: x / SG: x / pH: x  Gluc: 127 mg/dL / Ketone: x  / Bili: x / Urobili: x   Blood: x / Protein: x / Nitrite: x   Leuk Esterase: x / RBC: x / WBC x   Sq Epi: x / Non Sq Epi: x / Bacteria: x        RADIOLOGY & ADDITIONAL STUDIES:

## 2023-10-19 NOTE — PROGRESS NOTE ADULT - SUBJECTIVE AND OBJECTIVE BOX
INTERVAL HPI/OVERNIGHT EVENTS:  AMRITA overnight.    STATUS POST:    6/27: Laparoscopic lysis of adhesions, converted to open lysis of adhesions, SBR x 3, temporary abdominal closure, 5L cryst, 1L 5% alb, 3 pRBC, 1 FFP, 1 plts  6/28: ex lap, removal of abthera, ileocolic resection, small bowel anastamosis, , 1.6 crystalloid, 250 5%, 175 uop   7/3: IR Gendel drained perihepatic aspiration of serous fluid.   7/5: RTOR exlap, washout, ileocolic resection with end ileostomy, blow hole colostomy, fistula, red rubber from ileostomy to small bowel anastomosis; vicryl bridging mesh; R JOYCE below ileostomy, L JOYCE at small bowel enterotomy repair; 500 LR, 500 5% albumin, 3u PRBC, 2 FFP, 400 UOP,   9/11: s/p perc cony    SUBJECTIVE:  Patient seen and examined by chief resident and team on AM rounds. Patient without any acute complaints this morning, doing well.     MEDICATIONS  (STANDING):  chlorhexidine 2% Cloths 1 Application(s) Topical <User Schedule>  cholestyramine Powder (Sugar-Free) 4 Gram(s) Oral daily  diphenoxylate/atropine 2 Tablet(s) Oral every 6 hours  glucagon  Injectable 1 milliGRAM(s) IntraMuscular once  heparin   Injectable 5000 Unit(s) SubCutaneous every 8 hours  influenza  Vaccine (HIGH DOSE) 0.7 milliLiter(s) IntraMuscular once  levothyroxine Injectable 40 MICROGram(s) IV Push at bedtime  lipid, fat emulsion (Fish Oil and Plant Based) 20% Infusion 1.15 Gm/kG/Day (40.8 mL/Hr) IV Continuous <Continuous>  lipid, fat emulsion (Fish Oil and Plant Based) 20% Infusion 1.15 Gm/kG/Day (40.8 mL/Hr) IV Continuous <Continuous>  loperamide 4 milliGRAM(s) Oral every 6 hours  metoprolol tartrate Injectable 5 milliGRAM(s) IV Push every 6 hours  nystatin Powder 1 Application(s) Topical three times a day  opium Tincture 6 milliGRAM(s) Oral every 6 hours  Parenteral Nutrition - Adult 1 Each TPN Continuous <Continuous>  Parenteral Nutrition - Adult 1 Each TPN Continuous <Continuous>    MEDICATIONS  (PRN):  ondansetron Injectable 4 milliGRAM(s) IV Push every 8 hours PRN Nausea and/or Vomiting      Vital Signs Last 24 Hrs  T(C): 36.6 (19 Oct 2023 09:10), Max: 37.3 (19 Oct 2023 03:16)  T(F): 97.9 (19 Oct 2023 09:10), Max: 99.1 (19 Oct 2023 03:16)  HR: 93 (19 Oct 2023 10:00) (81 - 95)  BP: 122/82 (19 Oct 2023 10:00) (92/47 - 124/62)  BP(mean): 93 (19 Oct 2023 10:00) (67 - 93)  RR: 19 (19 Oct 2023 10:00) (16 - 25)  SpO2: 97% (19 Oct 2023 10:00) (96% - 100%)    Parameters below as of 19 Oct 2023 10:00  Patient On (Oxygen Delivery Method): room air        PHYSICAL EXAM:  GEN: NAD, resting comfortably in bed.   CV: RRR, no m/r/g  PULM: Nonlabored breathing, no respiratory distress. Breathing comfortably on RA.   ABD: Soft, NTND abdomen. Wound manager in place draining bilious fluid. Wound continues to heal appropriately, with granulation tissue overlying wound. LUQ JOYCE in place, with grey thick output. R side ileostomy with red rubber in place, orangish-yellowish clear output.   EXTREM: WWP, no edema, SCDs in place  NEURO: AOX3.       I&O's Detail    18 Oct 2023 07:01  -  19 Oct 2023 07:00  --------------------------------------------------------  IN:    Fat Emulsion (Fish Oil &amp; Plant Based) 20% Infusion: 541.5 mL    Oral Fluid: 1196 mL    TPN (Total Parenteral Nutrition): 2649 mL  Total IN: 4386.5 mL    OUT:    Bulb (mL): 27 mL    Drain (mL): 42 mL    Drain (mL): 2105 mL    Fat Emulsion (Fish Oil &amp; Plant Based) 20% Infusion: 0 mL    Voided (mL): 2035 mL  Total OUT: 4209 mL    Total NET: 177.5 mL      19 Oct 2023 07:01  -  19 Oct 2023 10:40  --------------------------------------------------------  IN:    Oral Fluid: 200 mL    TPN (Total Parenteral Nutrition): 441 mL  Total IN: 641 mL    OUT:    Bulb (mL): 80 mL    Drain (mL): 0 mL    Drain (mL): 575 mL    Voided (mL): 425 mL  Total OUT: 1080 mL    Total NET: -439 mL          LABS:                        8.1    8.81  )-----------( 164      ( 18 Oct 2023 05:30 )             25.5     10-19    134<L>  |  103  |  39<H>  ----------------------------<  127<H>  4.3   |  22  |  1.22    Ca    8.6      19 Oct 2023 05:30  Phos  3.3     10-19  Mg     2.0     10-19    TPro  7.8  /  Alb  2.4<L>  /  TBili  4.0<H>  /  DBili  x   /  AST  66<H>  /  ALT  79<H>  /  AlkPhos  128<H>  10-19      Urinalysis Basic - ( 19 Oct 2023 05:30 )    Color: x / Appearance: x / SG: x / pH: x  Gluc: 127 mg/dL / Ketone: x  / Bili: x / Urobili: x   Blood: x / Protein: x / Nitrite: x   Leuk Esterase: x / RBC: x / WBC x   Sq Epi: x / Non Sq Epi: x / Bacteria: x        RADIOLOGY & ADDITIONAL STUDIES:

## 2023-10-19 NOTE — PROGRESS NOTE ADULT - ASSESSMENT
78 M w/ Crohn's, AFib/Flutter s/p DCCVs on amiodarone, remote ileocectomy and open appendectomy. Admitted (6/23) for SBO vs Crohns flare, s/p NGT decompression and s/p lap TRE converted to open TRE, SBR x 3, left in discontinuity with abthera vac on (6/27), RTOR for ileocolic resection, small bowel anastomosis, and abdominal wall closure on (6/28), c/b fluid collection s/p IR aspiration of perihepatic fluid on (7/3), c/b wound dehiscence s/p RTOR exlap, washout, ileocolic resection with end ileostomy, blow hole colostomy, red rubber from ileostomy to small bowel anastomosis; vicryl bridging mesh on (7/5) transferred to SICU postoperatively for hemodynamic monitoring, with hospital course complicated by periods of AMS most recently on 9/1 and associated with oliguria, severe ELVI and hyponatremia, which resolved but stepped back up for to SICU on 9/10 for acute AMS, intermittent hypoglycemia, AFib with RVR. Percutaneous Cholecystostomy placed on 9/11 for enlarged GB, PCT capped 10/5.    Recommendations:  - C/w TPN  - C/w CLD  - Next wound  on Friday 10/20  - monitor strict I/Os, replete as necessary  Team 4c will continue to follow, please call with any questions or concerns  Plan discussed with chief resident and attending surgeon, Dr. Knapp

## 2023-10-19 NOTE — PROGRESS NOTE ADULT - ASSESSMENT
78 M w/ Crohn's, AFib/Flutter s/p DCCVs on amiodarone, remote ileocectomy and open appendectomy. Admitted (6/23) for SBO vs Crohns flare, s/p NGT decompression and s/p lap TRE converted to open TRE, SBR x 3, left in discontinuity with abthera vac on (6/27), RTOR for ileocolic resection, small bowel anastomosis, and abdominal wall closure on (6/28), c/b fluid collection s/p IR aspiration of perihepatic fluid on (7/3), c/b wound dehiscence s/p RTOR exlap, washout, ileocolic resection with end ileostomy, blow hole colostomy, red rubber from ileostomy to small bowel anastomosis; vicryl bridging mesh on (7/5) transferred to SICU postoperatively for hemodynamic monitoring, with hospital course complicated by periods of AMS most recently on 9/1 and associated with oliguria, severe ELVI and hyponatremia, which resolved but now stepped back up for to SICU on 9/10 for acute AMS, intermittent hypoglycemia, AFib with RVR. Perc Choley tube placed on 9/11 for enlarged GB.    Recommendations:  Max opium tincture - 6mg q6  Continue TPN  Continue max 2-3 ensures/day  Encourage OOB/ambulation  Continue Cholestyramine  Continue: octreotide, lomotil, imodium  Continue wound care evaluation   Rest of care per SICU team    Team 5c will continue to follow

## 2023-10-19 NOTE — PROGRESS NOTE ADULT - SUBJECTIVE AND OBJECTIVE BOX
INTERVAL/OVERNIGHT EVENTS: NAEO.    SUBJECTIVE: Doing well this AM, without pain or N/V. Tolerating Ensures. No CP/SOB. Voids without issues.    Neurologic Medications  ondansetron Injectable 4 milliGRAM(s) IV Push every 8 hours PRN Nausea and/or Vomiting  opium Tincture 6 milliGRAM(s) Oral every 6 hours    Cardiovascular Medications  metoprolol tartrate Injectable 5 milliGRAM(s) IV Push every 6 hours    Gastrointestinal Medications  diphenoxylate/atropine 2 Tablet(s) Oral every 6 hours  lactated ringers Bolus 500 milliLiter(s) IV Bolus once  lipid, fat emulsion (Fish Oil and Plant Based) 20% Infusion 1.15 Gm/kG/Day IV Continuous <Continuous>  loperamide 4 milliGRAM(s) Oral every 6 hours  Parenteral Nutrition - Adult 1 Each TPN Continuous <Continuous>    Hematologic/Oncologic Medications  heparin   Injectable 5000 Unit(s) SubCutaneous every 8 hours  influenza  Vaccine (HIGH DOSE) 0.7 milliLiter(s) IntraMuscular once    Endocrine/Metabolic Medications  cholestyramine Powder (Sugar-Free) 4 Gram(s) Oral daily  glucagon  Injectable 1 milliGRAM(s) IntraMuscular once  levothyroxine Injectable 40 MICROGram(s) IV Push at bedtime    Topical/Other Medications  chlorhexidine 2% Cloths 1 Application(s) Topical <User Schedule>  nystatin Powder 1 Application(s) Topical three times a day      MEDICATIONS  (PRN):  ondansetron Injectable 4 milliGRAM(s) IV Push every 8 hours PRN Nausea and/or Vomiting    I&O's Detail    18 Oct 2023 07:01  -  19 Oct 2023 07:00  --------------------------------------------------------  IN:    Fat Emulsion (Fish Oil &amp; Plant Based) 20% Infusion: 541.5 mL    Oral Fluid: 1196 mL    TPN (Total Parenteral Nutrition): 2649 mL  Total IN: 4386.5 mL    OUT:    Bulb (mL): 27 mL    Drain (mL): 42 mL    Drain (mL): 2105 mL    Fat Emulsion (Fish Oil &amp; Plant Based) 20% Infusion: 0 mL    Voided (mL): 2035 mL  Total OUT: 4209 mL    Total NET: 177.5 mL    19 Oct 2023 07:01  -  19 Oct 2023 11:00  --------------------------------------------------------  IN:    Oral Fluid: 200 mL    TPN (Total Parenteral Nutrition): 441 mL  Total IN: 641 mL    OUT:    Bulb (mL): 80 mL    Drain (mL): 0 mL    Drain (mL): 575 mL    Voided (mL): 425 mL  Total OUT: 1080 mL    Total NET: -439 mL          Vital Signs Last 24 Hrs  T(C): 36.6 (19 Oct 2023 09:10), Max: 37.3 (19 Oct 2023 03:16)  T(F): 97.9 (19 Oct 2023 09:10), Max: 99.1 (19 Oct 2023 03:16)  HR: 93 (19 Oct 2023 10:00) (81 - 95)  BP: 122/82 (19 Oct 2023 10:00) (92/47 - 124/62)  BP(mean): 93 (19 Oct 2023 10:00) (67 - 93)  RR: 19 (19 Oct 2023 10:00) (16 - 25)  SpO2: 97% (19 Oct 2023 10:00) (96% - 100%)    Parameters below as of 19 Oct 2023 10:00  Patient On (Oxygen Delivery Method): room air    Neurological: AAOx3, CNII-XII intact,  strength 5/5 b/l  ENT: dry mucus membranes  Cardiovascular: RRR  Respiratory: CTA  Gastrointestinal: soft, NT, ND, right sided ostomy bag with brown output, perc cony tube capped, left sided fistula dark greenish/brown output, left JOYCE with brown thick output.   Extremities: warm, no dependent edema  Vascular: no cyanosis/erythema  Skin: no rashes  MSK: no joint swelling.     LABS:                        8.1    8.81  )-----------( 164      ( 18 Oct 2023 05:30 )             25.5     10-19    134<L>  |  103  |  39<H>  ----------------------------<  127<H>  4.3   |  22  |  1.22    Ca    8.6      19 Oct 2023 05:30  Phos  3.3     10-19  Mg     2.0     10-19    TPro  7.8  /  Alb  2.4<L>  /  TBili  4.0<H>  /  DBili  x   /  AST  66<H>  /  ALT  79<H>  /  AlkPhos  128<H>  10-19    Urinalysis Basic - ( 19 Oct 2023 05:30 )    Color: x / Appearance: x / SG: x / pH: x  Gluc: 127 mg/dL / Ketone: x  / Bili: x / Urobili: x   Blood: x / Protein: x / Nitrite: x   Leuk Esterase: x / RBC: x / WBC x   Sq Epi: x / Non Sq Epi: x / Bacteria: x

## 2023-10-20 NOTE — ADVANCED PRACTICE NURSE CONSULT - ASSESSMENT
New Dustin's fistula/wound manager applied around wound to abdomen. Granulation tissue bleeds easily when cleansed, evidence of epitheliazation at edges of wound bed. Pt reports output has been thickening slightly. Periwound protected with stoma paste and stoma rings prior to application of wound manager. Pt tolerated procedure well.

## 2023-10-20 NOTE — PROGRESS NOTE ADULT - ASSESSMENT
78 M w/ Crohn's, AFib/Flutter s/p DCCVs on amiodarone, remote ileocectomy and open appendectomy. Admitted (6/23) for SBO vs Crohns flare, s/p NGT decompression and s/p lap TRE converted to open TRE, SBR x 3, left in discontinuity with abthera vac on (6/27), RTOR for ileocolic resection, small bowel anastomosis, and abdominal wall closure on (6/28), c/b fluid collection s/p IR aspiration of perihepatic fluid on (7/3), c/b wound dehiscence s/p RTOR exlap, washout, ileocolic resection with end ileostomy, blow hole colostomy, red rubber from ileostomy to small bowel anastomosis; vicryl bridging mesh on (7/5) transferred to SICU postoperatively for hemodynamic monitoring, with hospital course complicated by periods of AMS most recently on 9/1 and associated with oliguria, severe ELVI and hyponatremia, which resolved but stepped back up for to SICU on 9/10 for acute AMS, intermittent hypoglycemia, AFib with RVR. Percutaneous Cholecystostomy placed on 9/11 for enlarged GB, PCT capped 10/5.    NEURO: AMS (resolved), EEG neg. Hx of depression - Effexor Dc'd on 9/10 due to delirium. Cont Melatonin prn Insomnia, Cont Zofran   HENT: Cepacol  CV: Hx Afib/flutter: s/p DCCV, Off amiodarone given transaminitis; Continue Metoprolol 5q6. Hx HLD: Lipitor Held given high LFTs. TTE  (7/18) - PASP 64mmHg, EF 65-70%,  Hx HTN -  holding Norvasc 10qd. Holding Losartan  Cont Lopressor 5 q6.  PULM: no resp distress on room air.   GI/FEN: Cont Ensure x 2/day. Cont TPN (2.5liters/day volume) for high ostomy and ECF output: Cont Octreotide in TPN, Imodium, Lomotil, Tincture of Opium. Transaminitis of unknown origin, now stable. s/p Perc Choley on 9/11 - tube capped 10/5. PPI. Significant drainage from fistula, bolus as needed to keep I&O even. Cholestyramine started 10/18  : Voids, S/p ELVI resolved.  ENDO: No need to check ISS. Hx hypothyroid: IV Synthroid, TSH wnl on 9/10.  ID: Numerous SICU admissions for sepsis. Currently off abx. Cont Nystatin powder // PREVIOUSLY had C. tertium, Lactobacillis from his IR cx 7/3, and candida albicans, lactobacillus, vanc sensitive E faecium, vanc resistant E gallinarum, vanc resistant E casseliflavus, lactobacillus from his OR cx 7/5. Completed course of abx with Imipenem (9/10-9/15). Imipenem (8/26--) imipenem (6/30-7/12, 7/23-7/30), Dapto (6/30-7/5 and 7/23-7/24). Empiric dapto (8/23-24) and cefepime (8/23-24).   PPX: SCDs, SQH  LINES: PIVs, LUE PICC (9/1- ).   WOUNDS/DRAINS: Abdominal wound and fistula with wound manager in place dressing change M&F. Rt LQ Ostomy. JOYCE. IR PCT.   PT: Ambulate as tolerated   DISPO: SICU due to complicated hospital course. but will do vitals q4hrs (hold overnight), and CBC MWF, BMP daily in setting of high fistula output

## 2023-10-20 NOTE — CHART NOTE - NSCHARTNOTEFT_GEN_A_CORE
Admitting Diagnosis:   Patient is a 78y old  Male who presents with a chief complaint of SBO (19 Oct 2023 11:00)      PAST MEDICAL & SURGICAL HISTORY:  Essential hypertension  Hypertension      Atrial fibrillation  s/p cardioversion  and   Pt. reports 4 DCCV  Now on Amiodarone 200mg PO bid and Eliquis 5mg PO bid  Last DCCV 4 yrs ago at [facility]      Crohn's disease  s/p partial resection of ileum      Hyperlipidemia      Hypothyroidism      History of depression  On Venlafaxine ER 150mg PO bid      Junctional rhythm      H/O knee surgery      History of cataract surgery          Current Nutrition Order: PO- Ensure Clear TID; provides 240kcals, 8g protein each  TPN- 411g Dex, 144g AA, 100g lipids; provides 2973.4 kcals, 144g protein, GIR 3.35 mg/kg/min    PO Intake: Good (%) [ X  ]  Fair (50-75%) [   ] Poor (<25%) [   ]    GI Issues:  ileostomy, fistula with large output, see subjective below    Pain: no pain/discomfort noted    Skin Integrity: abd wound and fistula with wound manager in place, RLQ ostomy, R heel DTI, skin tear below ostomy. Rudy score 19    Labs:   10-    137  |  104  |  39<H>  ----------------------------<  131<H>  3.8   |  22  |  1.30    Ca    8.8      20 Oct 2023 05:30  Phos  3.5     10-20  Mg     2.1     10-20    TPro  7.9  /  Alb  2.2<L>  /  TBili  4.2<H>  /  DBili  x   /  AST  78<H>  /  ALT  87<H>  /  AlkPhos  133<H>  10-20    CAPILLARY BLOOD GLUCOSE          Medications:  MEDICATIONS  (STANDING):  chlorhexidine 2% Cloths 1 Application(s) Topical <User Schedule>  cholestyramine Powder (Sugar-Free) 4 Gram(s) Oral daily  diphenoxylate/atropine 2 Tablet(s) Oral every 6 hours  glucagon  Injectable 1 milliGRAM(s) IntraMuscular once  heparin   Injectable 5000 Unit(s) SubCutaneous every 8 hours  influenza  Vaccine (HIGH DOSE) 0.7 milliLiter(s) IntraMuscular once  levothyroxine Injectable 40 MICROGram(s) IV Push at bedtime  lipid, fat emulsion (Fish Oil and Plant Based) 20% Infusion 1.15 Gm/kG/Day (41.67 mL/Hr) IV Continuous <Continuous>  loperamide 4 milliGRAM(s) Oral every 6 hours  metoprolol tartrate Injectable 5 milliGRAM(s) IV Push every 6 hours  nystatin Powder 1 Application(s) Topical three times a day  opium Tincture 6 milliGRAM(s) Oral every 6 hours  Parenteral Nutrition - Adult 1 Each TPN Continuous <Continuous>  Parenteral Nutrition - Adult 1 Each TPN Continuous <Continuous>    MEDICATIONS  (PRN):  ondansetron Injectable 4 milliGRAM(s) IV Push every 8 hours PRN Nausea and/or Vomiting      Daily Weight in k.2 (20 Oct 2023 01:07)  Daily 81.9 [18 Oct 2023]  Daily 85.2kg [16 Oct 2023]  Daily   95.2kg [12 Oct 2023]   Daily 87.7kg [11 Oct 2023]  Daily 87.4kg [09 Oct 2023]  Daily 86.4kg [07 Oct 2023]  Daily 82.5kg [06 Oct 2023]  Daily 82.6kg [4 Oct 2023]   Daily 83.5kg [03 Oct 2023]  Daily 84.5kg [2 Oct 2023]  Daily 87.2kg [1 Oct 2023]  Daily 88.3kg [29 Sep 2023]  Daily 89.9kg [28 Sep 2023]  Daily 87.7kg [24 Sep 2023]  Daily 90.4kg [21 Sep 2023]  Daily 91.4kg [20 Sep 2023]  Daily 86.7kg [18 Sep 2023]  Daily 88.1kg [16 Sep 2023]  Daily 88.9kg [15 Sep 2023]   Daily 89.1 [14 Sep 2023]  Daily 92.9kg [6 Sep 2023]  Daily 91.8kg [27 Aug 2023]  Daily 101kg [9 Aug 2023]  Daily   102.1kg [10 July 2023]  Daily 99.9kg [2023]      Weight Change: weight trending down throughout admission. Recommend continue weekly/daily weights for trending & ensuring adequacy of nutrition provision    IBW: 86.4kg   % IBW: 98.6%    BMI 24.1    Estimated energy needs:   Current body wt [85.2kg] used for energy calculations as pt <100% IBW. Needs estimated for age and adjusted for current clinical status, increased needs for post-op & open abd wound healing    Calories: 3347-7921 kcals based on 30-40 kcals/kg  Protein: 127.8-170.4 g protein based on 1.5-2.0g protein/kg + additional depending on outputs  *Fluid needs per team    Subjective:   78M w/ Crohn's, AFib/Flutter s/p DCCVs on amiodarone, remote ileocectomy and open appy here for SBO vs Crohns flare, s/p NGT decompression and now s/p lap TRE converted to open TRE, SBR x 3, left in discontinuity with abthera vac on , RTOR for ileocolic resection, small bowel anastomosis, and abdominal wall closure on , and s/p IR aspiration of perihepatic fluid on 7/3, stepped down to telemetry on . wound dehiscence on , RTOR ex-lap, washout, ileocolic resection with end ileostomy, blow hole colostomy, red rubber from ileostomy to small bowel anastomosis; Vicryl bridging mesh on . Transferred to SICU post op for HD monitoring. Extubated  and SDU . Has been recovering on telemetry with ECF management and prolonged TPN. Upgraded to SICU  for acute delirium and tremors, r/o sepsis vs metabolic encephalopathy. Ammonia levels normal. TPN DC'ed and started on PO diet. Stepped down to 8 Lachman on .    Chart reviewed, discussed with team. Pt seen at bedside on 5 EA, sleeping. TPN remains primary means to nutrition, current provision provides 34.9 kcals/kg, 28.1 non-protein kcal/kg, 1.7g protein/kg. Pt continues on PO Ensure Clear TID [provides 240 kcals, 8g protein each]. Pt with good intake of supplements, usually drinks 2-3 per day. Output x 24hrs: 90ml LUQ JOYCE drain, 25ml RLQ ostomy, 2745ml mid abd wound. Labs reviewed: total bili 4.2 <H>, alk phos elevated, AST 78 <H>, ALT 87 <H>. To continue cycling TPN over 18hrs, holding micronutrients [copper, manganese] in TPN pending repeat serum level check.  <H> [10/20]. Meds: opium tincture, cholestyramine, lomotil, synthroid [administer 30-60 minutes before food; separate at least 4hrs from calcium or iron-containing products or bile acid sequestrants], imodium, zofran inj. prn. To continue providing nutrition education on an ongoing basis. RDN will continue to monitor, reassess, and intervene as appropriate.     Previous Nutrition Diagnosis:  Increased Nutrient Needs R/T physiological demands for nutrient AEB post-op wound healing    Active [ X  ]  Resolved [   ]    If resolved, new PES:     Goal:  Pt will meet at least 75% of protein & energy needs via most appropriate route for nutrition     Recommendations:  1. TPN via PICC as primary means to nutrition- 411g Dex, 144g AA, 100g Lipids; provides 2973.4 kcals, 144g protein, GIR 3.35 mg/kg/min   - provides 34.9 kcals/kg, 28.1 non-protein kcals/kg, 1.7g protein/kg IBW  - cycle TPN over 18hrs [6pm-12pm]  - MVI, selenium, zinc in bag  - monitor weights & wound healing, increase substrates as indicated  - pending copper, manganese levels- adjust in bag prn [consider giving 2-3x per week]  2. Fluid & lytes per team  - additional fluid bolus prn  3. PO diet per team/MD discretion  - Ensure Clear TID; provides 240 kcals, 8g protein each  - advance/hold PO as indicated  4. Weekly lipid panel   - hold/decrease lipids if TG >400  5. Monitor BMP/Mg/Phos, POC BG  - monitor & replenish lytes outside TPN bag prn  6. Continue close monitoring of clinical course & adjust recommendations prn  7. Monitor feasibility for advancing PO diet    Education: deferred, ongoing discussion regarding current nutrition Rx    Risk Level: High [ X  ] Moderate [   ] Low [   ]

## 2023-10-20 NOTE — PROGRESS NOTE ADULT - SUBJECTIVE AND OBJECTIVE BOX
SUBJECTIVE: Patient       heparin   Injectable 5000 Unit(s) SubCutaneous every 8 hours  metoprolol tartrate Injectable 5 milliGRAM(s) IV Push every 6 hours      Vital Signs Last 24 Hrs  T(C): 36.6 (20 Oct 2023 12:00), Max: 37.4 (19 Oct 2023 17:51)  T(F): 97.8 (20 Oct 2023 12:00), Max: 99.3 (19 Oct 2023 17:51)  HR: 95 (20 Oct 2023 08:19) (91 - 95)  BP: 102/51 (20 Oct 2023 08:19) (95/55 - 130/60)  BP(mean): 74 (20 Oct 2023 08:19) (70 - 86)  RR: 18 (20 Oct 2023 08:19) (18 - 26)  SpO2: 96% (20 Oct 2023 08:19) (94% - 99%)    Parameters below as of 20 Oct 2023 12:00  Patient On (Oxygen Delivery Method): room air      I&O's Detail    19 Oct 2023 07:01  -  20 Oct 2023 07:00  --------------------------------------------------------  IN:    Fat Emulsion (Fish Oil &amp; Plant Based) 20% Infusion: 574 mL    Lactated Ringers Bolus: 500 mL    Oral Fluid: 1460 mL    TPN (Total Parenteral Nutrition): 2649 mL  Total IN: 5183 mL    OUT:    Bulb (mL): 90 mL    Drain (mL): 25 mL    Drain (mL): 2745 mL    Voided (mL): 1500 mL  Total OUT: 4360 mL    Total NET: 823 mL      20 Oct 2023 07:01  -  20 Oct 2023 11:58  --------------------------------------------------------  IN:    IV PiggyBack: 100 mL    TPN (Total Parenteral Nutrition): 147 mL  Total IN: 247 mL    OUT:    Bulb (mL): 0 mL    Drain (mL): 5 mL    Drain (mL): 500 mL    Voided (mL): 100 mL  Total OUT: 605 mL    Total NET: -358 mL          General: NAD, resting comfortably in bed  C/V: NSR  Pulm: Nonlabored breathing, no respiratory distress  Abd: soft, NT/ND.  Extrem: WWP, no edema, SCDs in place  Drains:  [place]:      LABS:                        8.5    7.65  )-----------( 199      ( 20 Oct 2023 05:30 )             27.8     10-20    137  |  104  |  39<H>  ----------------------------<  131<H>  3.8   |  22  |  1.30    Ca    8.8      20 Oct 2023 05:30  Phos  3.5     10-20  Mg     2.1     10-20    TPro  7.9  /  Alb  2.2<L>  /  TBili  4.2<H>  /  DBili  x   /  AST  78<H>  /  ALT  87<H>  /  AlkPhos  133<H>  10-20      Urinalysis Basic - ( 20 Oct 2023 05:30 )    Color: x / Appearance: x / SG: x / pH: x  Gluc: 131 mg/dL / Ketone: x  / Bili: x / Urobili: x   Blood: x / Protein: x / Nitrite: x   Leuk Esterase: x / RBC: x / WBC x   Sq Epi: x / Non Sq Epi: x / Bacteria: x        RADIOLOGY & ADDITIONAL STUDIES:   SUBJECTIVE: Patient seen and examined at bedside with chief resident. Patient states that he is feeling well with no acute complaints. He is tolerating 2-3 ensures per day without nausea or vomiting.      heparin   Injectable 5000 Unit(s) SubCutaneous every 8 hours  metoprolol tartrate Injectable 5 milliGRAM(s) IV Push every 6 hours      Vital Signs Last 24 Hrs  T(C): 36.6 (20 Oct 2023 12:00), Max: 37.4 (19 Oct 2023 17:51)  T(F): 97.8 (20 Oct 2023 12:00), Max: 99.3 (19 Oct 2023 17:51)  HR: 95 (20 Oct 2023 08:19) (91 - 95)  BP: 102/51 (20 Oct 2023 08:19) (95/55 - 130/60)  BP(mean): 74 (20 Oct 2023 08:19) (70 - 86)  RR: 18 (20 Oct 2023 08:19) (18 - 26)  SpO2: 96% (20 Oct 2023 08:19) (94% - 99%)    Parameters below as of 20 Oct 2023 12:00  Patient On (Oxygen Delivery Method): room air      I&O's Detail    19 Oct 2023 07:01  -  20 Oct 2023 07:00  --------------------------------------------------------  IN:    Fat Emulsion (Fish Oil &amp; Plant Based) 20% Infusion: 574 mL    Lactated Ringers Bolus: 500 mL    Oral Fluid: 1460 mL    TPN (Total Parenteral Nutrition): 2649 mL  Total IN: 5183 mL    OUT:    Bulb (mL): 90 mL    Drain (mL): 25 mL    Drain (mL): 2745 mL    Voided (mL): 1500 mL  Total OUT: 4360 mL    Total NET: 823 mL      20 Oct 2023 07:01  -  20 Oct 2023 11:58  --------------------------------------------------------  IN:    IV PiggyBack: 100 mL    TPN (Total Parenteral Nutrition): 147 mL  Total IN: 247 mL    OUT:    Bulb (mL): 0 mL    Drain (mL): 5 mL    Drain (mL): 500 mL    Voided (mL): 100 mL  Total OUT: 605 mL    Total NET: -358 mL          General: NAD, resting comfortably in bed  C/V: NSR  Pulm: Nonlabored breathing, no respiratory distress  Abd: soft, NT/ND. Wound manager in place with bilious output.  Extrem: WWP, no edema, SCDs in place        LABS:                        8.5    7.65  )-----------( 199      ( 20 Oct 2023 05:30 )             27.8     10-20    137  |  104  |  39<H>  ----------------------------<  131<H>  3.8   |  22  |  1.30    Ca    8.8      20 Oct 2023 05:30  Phos  3.5     10-20  Mg     2.1     10-20    TPro  7.9  /  Alb  2.2<L>  /  TBili  4.2<H>  /  DBili  x   /  AST  78<H>  /  ALT  87<H>  /  AlkPhos  133<H>  10-20      Urinalysis Basic - ( 20 Oct 2023 05:30 )    Color: x / Appearance: x / SG: x / pH: x  Gluc: 131 mg/dL / Ketone: x  / Bili: x / Urobili: x   Blood: x / Protein: x / Nitrite: x   Leuk Esterase: x / RBC: x / WBC x   Sq Epi: x / Non Sq Epi: x / Bacteria: x        RADIOLOGY & ADDITIONAL STUDIES:

## 2023-10-20 NOTE — PROGRESS NOTE ADULT - ASSESSMENT
78 M w/ Crohn's, AFib/Flutter s/p DCCVs on amiodarone, remote ileocectomy and open appendectomy. Admitted (6/23) for SBO vs Crohns flare, s/p NGT decompression and s/p lap TRE converted to open TRE, SBR x 3, left in discontinuity with abthera vac on (6/27), RTOR for ileocolic resection, small bowel anastomosis, and abdominal wall closure on (6/28), c/b fluid collection s/p IR aspiration of perihepatic fluid on (7/3), c/b wound dehiscence s/p RTOR exlap, washout, ileocolic resection with end ileostomy, blow hole colostomy, red rubber from ileostomy to small bowel anastomosis; vicryl bridging mesh on (7/5) transferred to SICU postoperatively for hemodynamic monitoring, with hospital course complicated by periods of AMS most recently on 9/1 and associated with oliguria, severe ELVI and hyponatremia, which resolved but now stepped back up for to SICU on 9/10 for acute AMS, intermittent hypoglycemia, AFib with RVR. Perc Choley tube placed on 9/11 for enlarged GB.    Recommendations:  Strict I/Os - replete as needed  Monitor lytes  Continue max opium tincture  Continue TPN  Continue max 2-3 ensures/day  Encourage OOB/ambulation  Continue Cholestyramine  Continue: octreotide, lomotil, imodium  Continue wound care evaluation   Rest of care per SICU team    Team 5c will continue to follow

## 2023-10-20 NOTE — PROGRESS NOTE ADULT - SUBJECTIVE AND OBJECTIVE BOX
ON: AMRITA     SUBJECTIVE: Patient seen and examined at the bedside this morning. Denies any complaints. Denies n/v/sob/chest pain. Voiding without difficulty.     MEDICATIONS  (STANDING):  chlorhexidine 2% Cloths 1 Application(s) Topical <User Schedule>  cholestyramine Powder (Sugar-Free) 4 Gram(s) Oral daily  diphenoxylate/atropine 2 Tablet(s) Oral every 6 hours  glucagon  Injectable 1 milliGRAM(s) IntraMuscular once  heparin   Injectable 5000 Unit(s) SubCutaneous every 8 hours  influenza  Vaccine (HIGH DOSE) 0.7 milliLiter(s) IntraMuscular once  levothyroxine Injectable 40 MICROGram(s) IV Push at bedtime  lipid, fat emulsion (Fish Oil and Plant Based) 20% Infusion 1.15 Gm/kG/Day (40.8 mL/Hr) IV Continuous <Continuous>  loperamide 4 milliGRAM(s) Oral every 6 hours  metoprolol tartrate Injectable 5 milliGRAM(s) IV Push every 6 hours  nystatin Powder 1 Application(s) Topical three times a day  opium Tincture 6 milliGRAM(s) Oral every 6 hours  Parenteral Nutrition - Adult 1 Each TPN Continuous <Continuous>    MEDICATIONS  (PRN):  ondansetron Injectable 4 milliGRAM(s) IV Push every 8 hours PRN Nausea and/or Vomiting    ICU Vital Signs Last 24 Hrs  T(C): 37.1 (20 Oct 2023 05:37), Max: 37.4 (19 Oct 2023 17:51)  T(F): 98.7 (20 Oct 2023 05:37), Max: 99.3 (19 Oct 2023 17:51)  HR: 93 (20 Oct 2023 04:00) (91 - 94)  BP: 130/60 (20 Oct 2023 04:00) (95/55 - 130/60)  BP(mean): 86 (20 Oct 2023 04:00) (70 - 93)  ABP: --  ABP(mean): --  RR: 19 (20 Oct 2023 04:00) (18 - 26)  SpO2: 94% (20 Oct 2023 04:00) (94% - 99%)    O2 Parameters below as of 20 Oct 2023 04:00  Patient On (Oxygen Delivery Method): room air    Neurological: AAOx3, CNII-XII grossly intact,  strength 5/5 b/l  ENT: dry mucus membranes  Cardiovascular: RRR  Respiratory: CTA  Gastrointestinal: soft, NT, ND, wound vac in place, right sided ostomy bag with brown output, perc cony tube capped, left sided fistula dark greenish/brown output, left JOYCE with brown thick output.   Extremities: warm, no dependent edema  Vascular: no cyanosis/erythema  Skin: no rashes  MSK: no joint swelling.       I&O's Summary    19 Oct 2023 07:01  -  20 Oct 2023 07:00  --------------------------------------------------------  IN: 5036 mL / OUT: 4360 mL / NET: 676 mL      LABS:                        8.5    7.65  )-----------( 199      ( 20 Oct 2023 05:30 )             27.8     10-20    137  |  104  |  39<H>  ----------------------------<  131<H>  3.8   |  22  |  1.30    Ca    8.8      20 Oct 2023 05:30  Phos  3.5     10-20  Mg     2.1     10-20    TPro  7.9  /  Alb  2.2<L>  /  TBili  4.2<H>  /  DBili  x   /  AST  78<H>  /  ALT  87<H>  /  AlkPhos  133<H>  10-20      Urinalysis Basic - ( 20 Oct 2023 05:30 )    Color: x / Appearance: x / SG: x / pH: x  Gluc: 131 mg/dL / Ketone: x  / Bili: x / Urobili: x   Blood: x / Protein: x / Nitrite: x   Leuk Esterase: x / RBC: x / WBC x   Sq Epi: x / Non Sq Epi: x / Bacteria: x      CAPILLARY BLOOD GLUCOSE  LIVER FUNCTIONS - ( 20 Oct 2023 05:30 )  Alb: 2.2 g/dL / Pro: 7.9 g/dL / ALK PHOS: 133 U/L / ALT: 87 U/L / AST: 78 U/L / GGT: x

## 2023-10-21 NOTE — PROGRESS NOTE ADULT - SUBJECTIVE AND OBJECTIVE BOX
ON: AMRITA, Net pos [phone]    SUBJECTIVE: Pt seen and examined at bedside this am by ICU team. Patient is lying comfortably in bed with no complaints. Tolerating ensures, pain well controlled with current regimen. Patient denies fever, nausea, vomiting, chest pain, and shortness of breath.       MEDICATIONS  (STANDING):  chlorhexidine 2% Cloths 1 Application(s) Topical <User Schedule>  cholestyramine Powder (Sugar-Free) 4 Gram(s) Oral daily  diphenoxylate/atropine 2 Tablet(s) Oral every 6 hours  glucagon  Injectable 1 milliGRAM(s) IntraMuscular once  heparin   Injectable 5000 Unit(s) SubCutaneous every 8 hours  influenza  Vaccine (HIGH DOSE) 0.7 milliLiter(s) IntraMuscular once  levothyroxine Injectable 40 MICROGram(s) IV Push at bedtime  lipid, fat emulsion (Fish Oil and Plant Based) 20% Infusion 1.15 Gm/kG/Day (41.67 mL/Hr) IV Continuous <Continuous>  loperamide 4 milliGRAM(s) Oral every 6 hours  metoprolol tartrate Injectable 5 milliGRAM(s) IV Push every 6 hours  nystatin Powder 1 Application(s) Topical three times a day  opium Tincture 6 milliGRAM(s) Oral every 6 hours  Parenteral Nutrition - Adult 1 Each TPN Continuous <Continuous>  Parenteral Nutrition - Adult 1 Each TPN Continuous <Continuous>    MEDICATIONS  (PRN):  ondansetron Injectable 4 milliGRAM(s) IV Push every 8 hours PRN Nausea and/or Vomiting      Drips:     ICU Vital Signs Last 24 Hrs  T(C): 36.6 (21 Oct 2023 11:03), Max: 37.1 (21 Oct 2023 05:35)  T(F): 97.8 (21 Oct 2023 11:03), Max: 98.8 (21 Oct 2023 05:35)  HR: 94 (21 Oct 2023 12:00) (92 - 98)  BP: 115/57 (21 Oct 2023 12:00) (107/51 - 120/59)  BP(mean): 72 (21 Oct 2023 12:00) (72 - 85)  ABP: --  ABP(mean): --  RR: 22 (21 Oct 2023 12:00) (14 - 27)  SpO2: 100% (21 Oct 2023 12:00) (96% - 100%)    O2 Parameters below as of 21 Oct 2023 12:00  Patient On (Oxygen Delivery Method): room air      Physical Exam:  General: NAD  HEENT: NC/AT, EOMI, PERRLA, normal hearing, no oral lesions, neck supple w/o LAD  Pulmonary: Nonlabored breathing, no respiratory distress, CTA-B  Cardiovascular: NSR, no murmurs  Abdominal: soft, NT/ND, midline incision wound manager (Dustin pouch) in place. Right ileostomy with dark output. Capped perc cony. Left JOYCE with minimal brown output.   Extremities: WWP, 5/5 strength x 4, no clubbing/cyanosis/edema  Neuro: A/O x3, CNs II-XII grossly intact, normal motor/sensation, no focal deficits  Pulses: palpable distal pulses      I&O's Summary    20 Oct 2023 07:01  -  21 Oct 2023 07:00  --------------------------------------------------------  IN: 5003.7 mL / OUT: 4456 mL / NET: 547.7 mL    21 Oct 2023 07:01  -  21 Oct 2023 13:37  --------------------------------------------------------  IN: 735 mL / OUT: 1550 mL / NET: -815 mL        LABS:                        8.5    6.24  )-----------( 141      ( 21 Oct 2023 07:14 )             29.6     10-21    133<L>  |  105  |  36<H>  ----------------------------<  135<H>  4.1   |  17<L>  |  1.09    Ca    8.4      21 Oct 2023 07:14  Phos  3.3     10-21  Mg     2.0     10-21    TPro  7.9  /  Alb  2.2<L>  /  TBili  4.2<H>  /  DBili  x   /  AST  78<H>  /  ALT  87<H>  /  AlkPhos  133<H>  10-20      Urinalysis Basic - ( 21 Oct 2023 07:14 )    Color: x / Appearance: x / SG: x / pH: x  Gluc: 135 mg/dL / Ketone: x  / Bili: x / Urobili: x   Blood: x / Protein: x / Nitrite: x   Leuk Esterase: x / RBC: x / WBC x   Sq Epi: x / Non Sq Epi: x / Bacteria: x      CAPILLARY BLOOD GLUCOSE        LIVER FUNCTIONS - ( 20 Oct 2023 05:30 )  Alb: 2.2 g/dL / Pro: 7.9 g/dL / ALK PHOS: 133 U/L / ALT: 87 U/L / AST: 78 U/L / GGT: x             Cultures:    RADIOLOGY & ADDITIONAL STUDIES:

## 2023-10-21 NOTE — PROGRESS NOTE ADULT - ASSESSMENT
78 M w/ Crohn's, AFib/Flutter s/p DCCVs on amiodarone, remote ileocectomy and open appendectomy. Admitted (6/23) for SBO vs Crohns flare, s/p NGT decompression and s/p lap TRE converted to open TRE, SBR x 3, left in discontinuity with abthera vac on (6/27), RTOR for ileocolic resection, small bowel anastomosis, and abdominal wall closure on (6/28), c/b fluid collection s/p IR aspiration of perihepatic fluid on (7/3), c/b wound dehiscence s/p RTOR exlap, washout, ileocolic resection with end ileostomy, blow hole colostomy, red rubber from ileostomy to small bowel anastomosis; vicryl bridging mesh on (7/5) transferred to SICU postoperatively for hemodynamic monitoring, with hospital course complicated by periods of AMS most recently on 9/1 and associated with oliguria, severe ELVI and hyponatremia, which resolved but stepped back up for to SICU on 9/10 for acute AMS, intermittent hypoglycemia, AFib with RVR. Percutaneous Cholecystostomy placed on 9/11 for enlarged GB, PCT capped 10/5.    NEURO: AMS (resolved), EEG neg. Hx of depression - Effexor Dc'd on 9/10 due to delirium. Cont Melatonin prn Insomnia, Cont Zofran   HENT: Cepacol  CV: Hx Afib/flutter: s/p DCCV, Off amiodarone given transaminitis; Continue Metoprolol 5q6. Hx HLD: Lipitor Held given high LFTs. TTE  (7/18) - PASP 64mmHg, EF 65-70%,  Hx HTN -  holding Norvasc 10qd. Holding Losartan  Cont Lopressor 5 q6.   PULM: no resp distress on room air.   GI/FEN: Cont Ensure x 3/day. Cont TPN (2.5liters/day volume) for high ostomy and ECF output: Cont Octreotide in TPN, Imodium, Lomotil, Tincture of Opium. Transaminitis of unknown origin, now stable. s/p Perc Choley on 9/11 - tube capped 10/5. PPI. Significant drainge from fistula,  bolus as needed to keep I&O even. Cholestyramine started 10/18  : Voids, S/p ELVI resolved.  ENDO: No need to check ISS. Hx hypothyroid: IV Synthroid, TSH wnl on 9/10.  ID: Numerous SICU admissions for sepsis. Currently off abx. Cont Nystatin powder // PREVIOUSLY had C. tertium, Lactobacillis from his IR cx 7/3, and candida albicans, lactobacillus, vanc sensitive E faecium, vanc resistant E gallinarum, vanc resistant E casseliflavus, lactobacillus from his OR cx 7/5. Completed course of abx with Imipenem (9/10-9/15). Imipenem (8/26--) imipenem (6/30-7/12, 7/23-7/30), Dapto (6/30-7/5 and 7/23-7/24). Empiric dapto (8/23-24) and cefepime (8/23-24).   PPX: SCDs, SQH  LINES: PIVs, LUE PICC (9/1- )  WOUNDS/DRAINS: Abdominal wound and fistula with wound manager in place dressing change M &F. Rt LQ Ostomy. JOYCE. IR PCT.   PT: Ambulate as tolerated   DISPO: SICU due to complicated hospital course. but will do vitals q4hrs (hold overnight), and CBC MWF, BMP daily in setting of high fistula output

## 2023-10-21 NOTE — PROGRESS NOTE ADULT - SUBJECTIVE AND OBJECTIVE BOX
SUBJECTIVE: Patient seen and examined at bedside with chief resident. Patient states that he is feeling well and that he is tolerating his 2-3 ensures/day without nausea or vomiting. He states that his pain is well controlled.       heparin   Injectable 5000 Unit(s) SubCutaneous every 8 hours  metoprolol tartrate Injectable 5 milliGRAM(s) IV Push every 6 hours      Vital Signs Last 24 Hrs  T(C): 37.1 (21 Oct 2023 05:35), Max: 37.1 (21 Oct 2023 05:35)  T(F): 98.8 (21 Oct 2023 05:35), Max: 98.8 (21 Oct 2023 05:35)  HR: 95 (21 Oct 2023 05:45) (92 - 98)  BP: 120/56 (21 Oct 2023 05:45) (102/51 - 152/69)  BP(mean): 81 (21 Oct 2023 05:45) (74 - 99)  RR: 19 (21 Oct 2023 05:45) (14 - 27)  SpO2: 98% (21 Oct 2023 05:45) (96% - 99%)    Parameters below as of 21 Oct 2023 05:45  Patient On (Oxygen Delivery Method): room air      I&O's Detail    20 Oct 2023 07:01  -  21 Oct 2023 07:00  --------------------------------------------------------  IN:    Fat Emulsion (Fish Oil &amp; Plant Based) 20% Infusion: 583.7 mL    IV PiggyBack: 200 mL    IV PiggyBack: 1000 mL    Oral Fluid: 440 mL    Sodium Chloride 0.9% Bolus: 500 mL    TPN (Total Parenteral Nutrition): 2280 mL  Total IN: 5003.7 mL    OUT:    Bulb (mL): 26 mL    Drain (mL): 10 mL    Drain (mL): 3210 mL    Voided (mL): 1210 mL  Total OUT: 4456 mL    Total NET: 547.7 mL      21 Oct 2023 07:01  -  21 Oct 2023 08:04  --------------------------------------------------------  IN:    TPN (Total Parenteral Nutrition): 147 mL  Total IN: 147 mL    OUT:  Total OUT: 0 mL    Total NET: 147 mL          General: NAD, resting comfortably in bed  C/V: NSR  Pulm: Nonlabored breathing, no respiratory distress  Abd: soft, NT/ND, midline wound manager in place with bilious output  Extrem: WWP, no edema, SCDs in place      LABS:                        8.5    7.65  )-----------( 199      ( 20 Oct 2023 05:30 )             27.8     10-20    137  |  104  |  39<H>  ----------------------------<  131<H>  3.8   |  22  |  1.30    Ca    8.8      20 Oct 2023 05:30  Phos  3.5     10-20  Mg     2.1     10-20    TPro  7.9  /  Alb  2.2<L>  /  TBili  4.2<H>  /  DBili  x   /  AST  78<H>  /  ALT  87<H>  /  AlkPhos  133<H>  10-20      Urinalysis Basic - ( 20 Oct 2023 05:30 )    Color: x / Appearance: x / SG: x / pH: x  Gluc: 131 mg/dL / Ketone: x  / Bili: x / Urobili: x   Blood: x / Protein: x / Nitrite: x   Leuk Esterase: x / RBC: x / WBC x   Sq Epi: x / Non Sq Epi: x / Bacteria: x        RADIOLOGY & ADDITIONAL STUDIES:

## 2023-10-22 NOTE — PROGRESS NOTE ADULT - SUBJECTIVE AND OBJECTIVE BOX
SUBJECTIVE: Patient seen and examined bedside.    heparin   Injectable 5000 Unit(s) SubCutaneous every 8 hours  metoprolol tartrate Injectable 5 milliGRAM(s) IV Push every 6 hours      Vital Signs Last 24 Hrs  T(C): 36.6 (22 Oct 2023 06:10), Max: 37.2 (22 Oct 2023 02:09)  T(F): 97.9 (22 Oct 2023 06:10), Max: 99 (22 Oct 2023 02:09)  HR: 95 (22 Oct 2023 06:33) (93 - 96)  BP: 134/63 (22 Oct 2023 06:33) (95/52 - 137/66)  BP(mean): 90 (22 Oct 2023 06:33) (66 - 95)  RR: 32 (22 Oct 2023 06:33) (18 - 34)  SpO2: 97% (22 Oct 2023 06:33) (95% - 100%)    Parameters below as of 22 Oct 2023 06:33  Patient On (Oxygen Delivery Method): room air      I&O's Detail    21 Oct 2023 07:01  -  22 Oct 2023 07:00  --------------------------------------------------------  IN:    Fat Emulsion (Fish Oil &amp; Plant Based) 20% Infusion: 541.1 mL    IV PiggyBack: 250 mL    Oral Fluid: 1020 mL    Sodium Chloride 0.9% Bolus: 1000 mL    TPN (Total Parenteral Nutrition): 3162 mL  Total IN: 5973.1 mL    OUT:    Bulb (mL): 20 mL    Drain (mL): 20 mL    Drain (mL): 2725 mL    Voided (mL): 1870 mL  Total OUT: 4635 mL    Total NET: 1338.1 mL      22 Oct 2023 07:01  -  22 Oct 2023 08:45  --------------------------------------------------------  IN:    Oral Fluid: 240 mL    TPN (Total Parenteral Nutrition): 294 mL  Total IN: 534 mL    OUT:    Bulb (mL): 0 mL    Drain (mL): 0 mL    Drain (mL): 150 mL    Voided (mL): 200 mL  Total OUT: 350 mL    Total NET: 184 mL          General: NAD, resting comfortably in bed  C/V: NSR  Pulm: Nonlabored breathing, no respiratory distress  Abd: soft, NT/ND.  Extrem: WWP, no edema, SCDs in place        LABS:                        8.5    6.24  )-----------( 141      ( 21 Oct 2023 07:14 )             29.6     10-21    133<L>  |  105  |  36<H>  ----------------------------<  135<H>  4.1   |  17<L>  |  1.09    Ca    8.4      21 Oct 2023 07:14  Phos  3.3     10-21  Mg     2.0     10-21        Urinalysis Basic - ( 21 Oct 2023 07:14 )    Color: x / Appearance: x / SG: x / pH: x  Gluc: 135 mg/dL / Ketone: x  / Bili: x / Urobili: x   Blood: x / Protein: x / Nitrite: x   Leuk Esterase: x / RBC: x / WBC x   Sq Epi: x / Non Sq Epi: x / Bacteria: x        RADIOLOGY & ADDITIONAL STUDIES:   SUBJECTIVE: Patient seen and examined bedside.    heparin   Injectable 5000 Unit(s) SubCutaneous every 8 hours  metoprolol tartrate Injectable 5 milliGRAM(s) IV Push every 6 hours      Vital Signs Last 24 Hrs  T(C): 36.6 (22 Oct 2023 06:10), Max: 37.2 (22 Oct 2023 02:09)  T(F): 97.9 (22 Oct 2023 06:10), Max: 99 (22 Oct 2023 02:09)  HR: 95 (22 Oct 2023 06:33) (93 - 96)  BP: 134/63 (22 Oct 2023 06:33) (95/52 - 137/66)  BP(mean): 90 (22 Oct 2023 06:33) (66 - 95)  RR: 32 (22 Oct 2023 06:33) (18 - 34)  SpO2: 97% (22 Oct 2023 06:33) (95% - 100%)    Parameters below as of 22 Oct 2023 06:33  Patient On (Oxygen Delivery Method): room air      I&O's Detail    21 Oct 2023 07:01  -  22 Oct 2023 07:00  --------------------------------------------------------  IN:    Fat Emulsion (Fish Oil &amp; Plant Based) 20% Infusion: 541.1 mL    IV PiggyBack: 250 mL    Oral Fluid: 1020 mL    Sodium Chloride 0.9% Bolus: 1000 mL    TPN (Total Parenteral Nutrition): 3162 mL  Total IN: 5973.1 mL    OUT:    Bulb (mL): 20 mL    Drain (mL): 20 mL    Drain (mL): 2725 mL    Voided (mL): 1870 mL  Total OUT: 4635 mL    Total NET: 1338.1 mL      22 Oct 2023 07:01  -  22 Oct 2023 08:45  --------------------------------------------------------  IN:    Oral Fluid: 240 mL    TPN (Total Parenteral Nutrition): 294 mL  Total IN: 534 mL    OUT:    Bulb (mL): 0 mL    Drain (mL): 0 mL    Drain (mL): 150 mL    Voided (mL): 200 mL  Total OUT: 350 mL    Total NET: 184 mL        Physical Exam:  General: NAD, resting comfortably in bed  Pulmonary: Nonlabored breathing, no respiratory distress  Cardiovascular: NSR  Abdominal: soft, NT/ND, midline wound manager in place with bilious output   Extremities: WWP, normal strength  Neuro: A/O x 3, CNs II-XII grossly intact        LABS:                        8.5    6.24  )-----------( 141      ( 21 Oct 2023 07:14 )             29.6     10-21    133<L>  |  105  |  36<H>  ----------------------------<  135<H>  4.1   |  17<L>  |  1.09    Ca    8.4      21 Oct 2023 07:14  Phos  3.3     10-21  Mg     2.0     10-21        Urinalysis Basic - ( 21 Oct 2023 07:14 )    Color: x / Appearance: x / SG: x / pH: x  Gluc: 135 mg/dL / Ketone: x  / Bili: x / Urobili: x   Blood: x / Protein: x / Nitrite: x   Leuk Esterase: x / RBC: x / WBC x   Sq Epi: x / Non Sq Epi: x / Bacteria: x        RADIOLOGY & ADDITIONAL STUDIES:

## 2023-10-22 NOTE — PROGRESS NOTE ADULT - SUBJECTIVE AND OBJECTIVE BOX
INCOMPLETE Overnight events: No acute overnight events.        POD  6/27: Laparoscopic lysis of adhesions, converted to open lysis of adhesions, SBR x 3, temporary abdominal closure, 5L cryst, 1L 5% alb, 3 pRBC, 1 FFP, 1 plts  6/28: ex lap, removal of abthera, ileocolic resection, small bowel anastamosis, , 1.6 crystalloid, 250 5%, 175 uop   7/3: IR Gendel drained perihepatic aspiration of serous fluid.   7/5: RTOR exlap, washout, ileocolic resection with end ileostomy, blow hole colostomy, fistula, red rubber from ileostomy to small bowel anastomosis; vicryl bridging mesh; R JOYCE below ileostomy, L JOYCE at small bowel enterotomy repair; 500 LR, 500 5% albumin, 3u PRBC, 2 FFP, 400 UOP,   9/11: s/p perc cony    SUBJECTIVE: Patient seen at bedside with chief resident. Patient reports feeling well especially when walking yesterday. Patient denies any nausea or vomiting.       MEDICATIONS  (STANDING):  chlorhexidine 2% Cloths 1 Application(s) Topical <User Schedule>  cholestyramine Powder (Sugar-Free) 4 Gram(s) Oral daily  diphenoxylate/atropine 2 Tablet(s) Oral every 6 hours  glucagon  Injectable 1 milliGRAM(s) IntraMuscular once  heparin   Injectable 5000 Unit(s) SubCutaneous every 8 hours  influenza  Vaccine (HIGH DOSE) 0.7 milliLiter(s) IntraMuscular once  levothyroxine Injectable 40 MICROGram(s) IV Push at bedtime  lipid, fat emulsion (Fish Oil and Plant Based) 20% Infusion 1.15 Gm/kG/Day (41.67 mL/Hr) IV Continuous <Continuous>  loperamide 4 milliGRAM(s) Oral every 6 hours  metoprolol tartrate Injectable 5 milliGRAM(s) IV Push every 6 hours  nystatin Powder 1 Application(s) Topical three times a day  opium Tincture 6 milliGRAM(s) Oral every 6 hours  Parenteral Nutrition - Adult 1 Each TPN Continuous <Continuous>    MEDICATIONS  (PRN):  ondansetron Injectable 4 milliGRAM(s) IV Push every 8 hours PRN Nausea and/or Vomiting      Vital Signs Last 24 Hrs  T(C): 36.6 (22 Oct 2023 06:10), Max: 37.2 (22 Oct 2023 02:09)  T(F): 97.9 (22 Oct 2023 06:10), Max: 99 (22 Oct 2023 02:09)  HR: 95 (22 Oct 2023 06:33) (93 - 96)  BP: 134/63 (22 Oct 2023 06:33) (95/52 - 137/66)  BP(mean): 90 (22 Oct 2023 06:33) (66 - 95)  RR: 32 (22 Oct 2023 06:33) (18 - 34)  SpO2: 97% (22 Oct 2023 06:33) (95% - 100%)    Parameters below as of 22 Oct 2023 06:33  Patient On (Oxygen Delivery Method): room air        Physical Exam:  General: NAD, resting comfortably in bed  Pulmonary: Nonlabored breathing, no respiratory distress  Cardiovascular: NSR  Abdominal: soft, NT/ND, midline wound manager in place with bilious output   Extremities: WWP, normal strength  Neuro: A/O x 3, CNs II-XII grossly intact      I&O's Summary    21 Oct 2023 07:01  -  22 Oct 2023 07:00  --------------------------------------------------------  IN: 5973.1 mL / OUT: 4635 mL / NET: 1338.1 mL    22 Oct 2023 07:01  -  22 Oct 2023 08:00  --------------------------------------------------------  IN: 147 mL / OUT: 0 mL / NET: 147 mL        LABS:                        8.5    6.24  )-----------( 141      ( 21 Oct 2023 07:14 )             29.6     10-21    133<L>  |  105  |  36<H>  ----------------------------<  135<H>  4.1   |  17<L>  |  1.09    Ca    8.4      21 Oct 2023 07:14  Phos  3.3     10-21  Mg     2.0     10-21        Urinalysis Basic - ( 21 Oct 2023 07:14 )    Color: x / Appearance: x / SG: x / pH: x  Gluc: 135 mg/dL / Ketone: x  / Bili: x / Urobili: x   Blood: x / Protein: x / Nitrite: x   Leuk Esterase: x / RBC: x / WBC x   Sq Epi: x / Non Sq Epi: x / Bacteria: x      CAPILLARY BLOOD GLUCOSE      POCT Blood Glucose.: 124 mg/dL (22 Oct 2023 04:35)        RADIOLOGY & ADDITIONAL STUDIES:

## 2023-10-22 NOTE — PROGRESS NOTE ADULT - ASSESSMENT
78 M w/ Crohn's, AFib/Flutter s/p DCCVs on amiodarone, remote ileocectomy and open appendectomy. Admitted (6/23) for SBO vs Crohns flare, s/p NGT decompression and s/p lap TRE converted to open TRE, SBR x 3, left in discontinuity with abthera vac on (6/27), RTOR for ileocolic resection, small bowel anastomosis, and abdominal wall closure on (6/28), c/b fluid collection s/p IR aspiration of perihepatic fluid on (7/3), c/b wound dehiscence s/p RTOR exlap, washout, ileocolic resection with end ileostomy, blow hole colostomy, red rubber from ileostomy to small bowel anastomosis; vicryl bridging mesh on (7/5) transferred to SICU postoperatively for hemodynamic monitoring, with hospital course complicated by periods of AMS most recently on 9/1 and associated with oliguria, severe ELVI and hyponatremia, which resolved but now stepped back up for to SICU on 9/10 for acute AMS, intermittent hypoglycemia, AFib with RVR. Perc Choley tube placed on 9/11 for enlarged GB.     Recommendations:  Strict I/Os - replete as needed  Monitor lytes  Continue max opium tincture  Continue TPN  Continue max 2-3 ensures/day  Encourage OOB/ambulation  Continue Cholestyramine  Continue: octreotide, lomotil, imodium  Continue wound care evaluation   Rest of care per SICU team    Team 5c will continue to follow      Plans to be discussed with attending and chief resident for finalization.

## 2023-10-22 NOTE — PROGRESS NOTE ADULT - SUBJECTIVE AND OBJECTIVE BOX
Interval Events:  given 250cc bolus - felt clammy and dehydrated. net pos in AM  Patient seen and examined at bedside.      Allergies    penicillin (Angioedema)    Intolerances        Vital Signs Last 24 Hrs  T(C): 36.3 (22 Oct 2023 08:13), Max: 37.2 (22 Oct 2023 02:09)  T(F): 97.3 (22 Oct 2023 08:13), Max: 99 (22 Oct 2023 02:09)  HR: 93 (22 Oct 2023 10:00) (93 - 96)  BP: 128/60 (22 Oct 2023 10:00) (95/52 - 137/66)  BP(mean): 87 (22 Oct 2023 10:00) (66 - 95)  RR: 15 (22 Oct 2023 10:00) (15 - 34)  SpO2: 97% (22 Oct 2023 10:00) (95% - 100%)    Parameters below as of 22 Oct 2023 10:00  Patient On (Oxygen Delivery Method): room air        10-21 @ 07:01  -  10-22 @ 07:00  --------------------------------------------------------  IN: 5973.1 mL / OUT: 4635 mL / NET: 1338.1 mL    10-22 @ 07:01  -  10-22 @ 11:47  --------------------------------------------------------  IN: 534 mL / OUT: 350 mL / NET: 184 mL      10-21 @ 07:01  -  10-22 @ 07:00  --------------------------------------------------------  IN: 5973.1 mL / OUT: 4635 mL / NET: 1338.1 mL    10-22 @ 07:01  -  10-22 @ 11:47  --------------------------------------------------------  IN: 534 mL / OUT: 350 mL / NET: 184 mL        Physical Exam:     General: NAD  Neuro: A/O x3, CNs II-XII grossly intact, normal motor/sensation, no focal deficits  HEENT: NC/AT, EOMI, PERRLA, normal hearing, no oral lesions, neck supple w/o LAD  Pulmonary: Nonlabored breathing, no respiratory distress, CTA-B  Cardiovascular: NSR, no murmurs  Abdominal: soft, NT/ND, midline incision wound manager (Dustin pouch) in place draining green fluid. Right ileostomy with minimal brown liquid out put . Capped perc cony. Left JOYCE with minimal brown output.   Extremities: WWP, 5/5 strength x 4, no clubbing/cyanosis/edema  Pulses: palpable distal pulses  Skin: no rashes noted  MSK: No joint swelling  Psych: No signs of anxiety or depression      LABS:      CBC Full  -  ( 21 Oct 2023 07:14 )  WBC Count : 6.24 K/uL  RBC Count : 2.81 M/uL  Hemoglobin : 8.5 g/dL  Hematocrit : 29.6 %  Platelet Count - Automated : 141 K/uL  Mean Cell Volume : 105.3 fl  Mean Cell Hemoglobin : 30.2 pg  Mean Cell Hemoglobin Concentration : 28.7 gm/dL  Auto Neutrophil # : x  Auto Lymphocyte # : x  Auto Monocyte # : x  Auto Eosinophil # : x  Auto Basophil # : x  Auto Neutrophil % : x  Auto Lymphocyte % : x  Auto Monocyte % : x  Auto Eosinophil % : x  Auto Basophil % : x    10-21    133<L>  |  105  |  36<H>  ----------------------------<  135<H>  4.1   |  17<L>  |  1.09    Ca    8.4      21 Oct 2023 07:14  Phos  3.3     10-21  Mg     2.0     10-21            Urinalysis Basic - ( 21 Oct 2023 07:14 )    Color: x / Appearance: x / SG: x / pH: x  Gluc: 135 mg/dL / Ketone: x  / Bili: x / Urobili: x   Blood: x / Protein: x / Nitrite: x   Leuk Esterase: x / RBC: x / WBC x   Sq Epi: x / Non Sq Epi: x / Bacteria: x              RADIOLOGY & ADDITIONAL STUDIES (The following images were personally reviewed):          78 M w/ Crohn's, AFib/Flutter s/p DCCVs on amiodarone, remote ileocectomy and open appendectomy. Admitted (6/23) for SBO vs Crohns flare, s/p NGT decompression and s/p lap TRE converted to open TRE, SBR x 3, left in discontinuity with abthera vac on (6/27), RTOR for ileocolic resection, small bowel anastomosis, and abdominal wall closure on (6/28), c/b fluid collection s/p IR aspiration of perihepatic fluid on (7/3), c/b wound dehiscence s/p RTOR exlap, washout, ileocolic resection with end ileostomy, blow hole colostomy, red rubber from ileostomy to small bowel anastomosis; vicryl bridging mesh on (7/5) transferred to SICU postoperatively for hemodynamic monitoring, with hospital course complicated by periods of AMS most recently on 9/1 and associated with oliguria, severe ELVI and hyponatremia, which resolved but stepped back up for to SICU on 9/10 for acute AMS, intermittent hypoglycemia, AFib with RVR. Percutaneous Cholecystostomy placed on 9/11 for enlarged GB, PCT capped 10/5.    NEURO: AMS (resolved), EEG neg. Hx of depression - Effexor Dc'd on 9/10 due to delirium. Cont Melatonin prn Insomnia, Cont Zofran   HENT: Cepacol  CV: Hx Afib/flutter: s/p DCCV, Off amiodarone given transaminitis; Continue Metoprolol 5q6. Hx HLD: Lipitor Held given high LFTs. TTE  (7/18) - PASP 64mmHg, EF 65-70%,  Hx HTN -  holding Norvasc 10qd. Holding Losartan  Cont Lopressor 5 q6.   PULM: no resp distress on room air.   GI/FEN: Cont Ensure x 3/day. Cont TPN (2.5liters/day volume) for high ostomy and ECF output: Cont Octreotide in TPN, Cholestyramine, Imodium, Lomotil, Tincture of Opium. Transaminitis of unknown origin, now stable. s/p Perc Choley on 9/11 - tube capped 10/5. PPI. Significant drainge from fistula,  bolus as needed to keep I&O even. Will consult GI regarding Crohns treatment.   : Voids, S/p ELVI resolved.  ENDO: No need to check ISS. Hx hypothyroid: IV Synthroid, TSH wnl on 9/10.  ID: Numerous SICU admissions for sepsis. Currently off abx. Cont Nystatin powder // PREVIOUSLY had C. tertium, Lactobacillis from his IR cx 7/3, and candida albicans, lactobacillus, vanc sensitive E faecium, vanc resistant E gallinarum, vanc resistant E casseliflavus, lactobacillus from his OR cx 7/5. Completed course of abx with Imipenem (9/10-9/15). Imipenem (8/26--) imipenem (6/30-7/12, 7/23-7/30), Dapto (6/30-7/5 and 7/23-7/24). Empiric dapto (8/23-24) and cefepime (8/23-24).   PPX: SCDs, SQH  LINES: PIVs, LUE PICC (9/1- )  WOUNDS/DRAINS: Abdominal wound and fistula with wound manager in place dressing change M &F. Rt LQ Ostomy. JOYCE. IR PCT.   PT: Ambulate as tolerated   DISPO: SICU due to complicated hospital course. but will do vitals q4hrs (hold overnight), and CBC MWF, BMP daily in setting of high fistula output

## 2023-10-22 NOTE — PROGRESS NOTE ADULT - ASSESSMENT
78 M w/ Crohn's, AFib/Flutter s/p DCCVs on amiodarone, remote ileocectomy and open appendectomy. Admitted (6/23) for SBO vs Crohns flare, s/p NGT decompression and s/p lap TRE converted to open TRE, SBR x 3, left in discontinuity with abthera vac on (6/27), RTOR for ileocolic resection, small bowel anastomosis, and abdominal wall closure on (6/28), c/b fluid collection s/p IR aspiration of perihepatic fluid on (7/3), c/b wound dehiscence s/p RTOR exlap, washout, ileocolic resection with end ileostomy, blow hole colostomy, red rubber from ileostomy to small bowel anastomosis; vicryl bridging mesh on (7/5) transferred to SICU postoperatively for hemodynamic monitoring, with hospital course complicated by periods of AMS most recently on 9/1 and associated with oliguria, severe ELVI and hyponatremia, which resolved but stepped back up for to SICU on 9/10 for acute AMS, intermittent hypoglycemia, AFib with RVR. Percutaneous Cholecystostomy placed on 9/11 for enlarged GB, PCT capped 10/5.    Recommendations:  - C/w TPN  - C/w CLD  - Next wound  on Friday 10/20  - monitor strict I/Os, replete as necessary  Team 4c will continue to follow, please call with any questions or concerns 78 M w/ Crohn's, AFib/Flutter s/p DCCVs on amiodarone, remote ileocectomy and open appendectomy. Admitted (6/23) for SBO vs Crohns flare, s/p NGT decompression and s/p lap TRE converted to open TRE, SBR x 3, left in discontinuity with abthera vac on (6/27), RTOR for ileocolic resection, small bowel anastomosis, and abdominal wall closure on (6/28), c/b fluid collection s/p IR aspiration of perihepatic fluid on (7/3), c/b wound dehiscence s/p RTOR exlap, washout, ileocolic resection with end ileostomy, blow hole colostomy, red rubber from ileostomy to small bowel anastomosis; vicryl bridging mesh on (7/5) transferred to SICU postoperatively for hemodynamic monitoring, with hospital course complicated by periods of AMS most recently on 9/1 and associated with oliguria, severe ELVI and hyponatremia, which resolved but stepped back up for to SICU on 9/10 for acute AMS, intermittent hypoglycemia, AFib with RVR. Percutaneous Cholecystostomy placed on 9/11 for enlarged GB, PCT capped 10/5.    Recommendations:  - C/w TPN  - C/w CLD  - monitor strict I/Os, replete as necessary  Team 4c will continue to follow, please call with any questions or concerns

## 2023-10-23 NOTE — PROGRESS NOTE ADULT - SUBJECTIVE AND OBJECTIVE BOX
SUBJECTIVE: Patient seen and examined at bedside with chief resident. Patient states that he feels better after receiving the 500cc bolus. He denies nausea and vomiting, but states that he is losing interest in drinking ensure. His pain is well controlled.      heparin   Injectable 5000 Unit(s) SubCutaneous every 8 hours  metoprolol tartrate Injectable 5 milliGRAM(s) IV Push every 6 hours      Vital Signs Last 24 Hrs  T(C): 37.4 (23 Oct 2023 09:30), Max: 37.6 (22 Oct 2023 22:04)  T(F): 99.3 (23 Oct 2023 09:30), Max: 99.6 (22 Oct 2023 22:04)  HR: 97 (23 Oct 2023 06:00) (93 - 97)  BP: 104/53 (23 Oct 2023 06:00) (92/49 - 128/60)  BP(mean): 76 (23 Oct 2023 06:00) (67 - 87)  RR: 28 (23 Oct 2023 06:00) (15 - 28)  SpO2: 94% (23 Oct 2023 06:00) (92% - 100%)    Parameters below as of 23 Oct 2023 06:00  Patient On (Oxygen Delivery Method): room air      I&O's Detail    22 Oct 2023 07:01  -  23 Oct 2023 07:00  --------------------------------------------------------  IN:    Fat Emulsion (Fish Oil &amp; Plant Based) 20% Infusion: 500.4 mL    Lactated Ringers Bolus: 500 mL    Oral Fluid: 720 mL    TPN (Total Parenteral Nutrition): 2353 mL  Total IN: 4073.4 mL    OUT:    Bulb (mL): 70 mL    Drain (mL): 2680 mL    Drain (mL): 10 mL    Emesis (mL): 30 mL    Voided (mL): 1130 mL  Total OUT: 3920 mL    Total NET: 153.4 mL      23 Oct 2023 07:01  -  23 Oct 2023 09:41  --------------------------------------------------------  IN:    TPN (Total Parenteral Nutrition): 294 mL  Total IN: 294 mL    OUT:    Bulb (mL): 0 mL    Drain (mL): 0 mL    Drain (mL): 100 mL  Total OUT: 100 mL    Total NET: 194 mL          General: NAD, resting comfortably in bed  C/V: NSR  Pulm: Nonlabored breathing, no respiratory distress  Abd: soft, NT/ND, midline wound manager in place with bilious output.  Extrem: WWP, no edema, SCDs in place      LABS:                        7.9    10.70 )-----------( 173      ( 23 Oct 2023 05:03 )             25.0     10    134<L>  |  105  |  38<H>  ----------------------------<  118<H>  3.7   |  20<L>  |  1.39<H>    Ca    8.4      23 Oct 2023 05:03  Phos  3.4     10-23  Mg     1.9     10-23    TPro  7.3  /  Alb  2.2<L>  /  TBili  4.6<H>  /  DBili  x   /  AST  82<H>  /  ALT  79<H>  /  AlkPhos  129<H>  10-23      Urinalysis Basic - ( 23 Oct 2023 05:47 )    Color: Dark Yellow / Appearance: Cloudy / S.018 / pH: x  Gluc: x / Ketone: Negative mg/dL  / Bili: Moderate / Urobili: 0.2 mg/dL   Blood: x / Protein: Trace mg/dL / Nitrite: Negative   Leuk Esterase: Trace / RBC: 28 /HPF / WBC 1 /HPF   Sq Epi: x / Non Sq Epi: 2 /HPF / Bacteria: Moderate /HPF        RADIOLOGY & ADDITIONAL STUDIES:

## 2023-10-23 NOTE — PROGRESS NOTE ADULT - ASSESSMENT
78 M w/ Crohn's, AFib/Flutter s/p DCCVs on amiodarone, remote ileocectomy and open appendectomy. Admitted (6/23) for SBO vs Crohns flare, s/p NGT decompression and s/p lap TRE converted to open TRE, SBR x 3, left in discontinuity with abthera vac on (6/27), RTOR for ileocolic resection, small bowel anastomosis, and abdominal wall closure on (6/28), c/b fluid collection s/p IR aspiration of perihepatic fluid on (7/3), c/b wound dehiscence s/p RTOR exlap, washout, ileocolic resection with end ileostomy, blow hole colostomy, red rubber from ileostomy to small bowel anastomosis; vicryl bridging mesh on (7/5) transferred to SICU postoperatively for hemodynamic monitoring, with hospital course complicated by periods of AMS most recently on 9/1 and associated with oliguria, severe ELIV and hyponatremia, which resolved but now stepped back up for to SICU on 9/10 for acute AMS, intermittent hypoglycemia, AFib with RVR. Perc Choley tube placed on 9/11 for enlarged GB.     Recommendations:  Strict I/Os - replete as needed  Monitor lytes  Continue max opium tincture  Continue TPN  Encourage OOB/ambulation  Continue Cholestyramine  Continue: octreotide, lomotil, imodium  Continue wound care evaluation   Rest of care per SICU team    Team 5c will continue to follow

## 2023-10-23 NOTE — PROGRESS NOTE ADULT - SUBJECTIVE AND OBJECTIVE BOX
Patient seen and examined at bedside. Patient states no abdominal pain. Pt out of bed to chair, voiding spontaneously.   Patient reports nausea and dry mouth; denies fever, chills, chest pain, shortness of breath, lightheadedness, dizziness.      T(F): 98.7 (10-23-23 @ 05:45), Max: 99.6 (10-22-23 @ 22:04)  HR: 97 (10-23-23 @ 06:00) (93 - 97)  BP: 104/53 (10-23-23 @ 06:00) (92/49 - 128/60)  RR: 28 (10-23-23 @ 06:00) (15 - 28)  SpO2: 94% (10-23-23 @ 06:00) (92% - 100%)  Wt(kg): --  I&O's Summary    22 Oct 2023 07:01  -  23 Oct 2023 07:00  --------------------------------------------------------  IN: 4073.4 mL / OUT: 3770 mL / NET: 303.4 mL    23 Oct 2023 07:01  -  23 Oct 2023 07:53  --------------------------------------------------------  IN: 147 mL / OUT: 0 mL / NET: 147 mL        MEDICATIONS  (STANDING):  chlorhexidine 2% Cloths 1 Application(s) Topical <User Schedule>  cholestyramine Powder (Sugar-Free) 4 Gram(s) Oral daily  diphenoxylate/atropine 2 Tablet(s) Oral every 6 hours  glucagon  Injectable 1 milliGRAM(s) IntraMuscular once  heparin   Injectable 5000 Unit(s) SubCutaneous every 8 hours  influenza  Vaccine (HIGH DOSE) 0.7 milliLiter(s) IntraMuscular once  levothyroxine Injectable 40 MICROGram(s) IV Push at bedtime  lipid, fat emulsion (Fish Oil and Plant Based) 20% Infusion 1.2 Gm/kG/Day (41.67 mL/Hr) IV Continuous <Continuous>  loperamide 4 milliGRAM(s) Oral every 6 hours  metoprolol tartrate Injectable 5 milliGRAM(s) IV Push every 6 hours  nystatin Powder 1 Application(s) Topical three times a day  opium Tincture 6 milliGRAM(s) Oral every 6 hours  Parenteral Nutrition - Adult 1 Each TPN Continuous <Continuous>    MEDICATIONS  (PRN):  ondansetron Injectable 4 milliGRAM(s) IV Push every 8 hours PRN Nausea and/or Vomiting      Physical Exam:  Constitutional: no apparent distress  Cardiovascular: Regular rate and rhythm   Pulmonary: clear to auscultation bilaterally   Abdomen: incision site clean, dry, intact; soft, mildly tender, [] distended, no guarding, no rebound, [] bowel sounds  Extremities: no lower extremity edema or calf tenderness bilaterally; SCDs in place bilaterally    LABS:                        7.9    10.70 )-----------( 173      ( 23 Oct 2023 05:03 )             25.0     10-    134<L>  |  105  |  38<H>  ----------------------------<  118<H>  3.7   |  20<L>  |  1.39<H>    Ca    8.4      23 Oct 2023 05:03  Phos  3.4     10  Mg     1.9     10    TPro  7.3  /  Alb  2.2<L>  /  TBili  4.6<H>  /  DBili  x   /  AST  82<H>  /  ALT  79<H>  /  AlkPhos  129<H>  10      Urinalysis Basic - ( 23 Oct 2023 05:47 )    Color: Dark Yellow / Appearance: Cloudy / S.018 / pH: x  Gluc: x / Ketone: Negative mg/dL  / Bili: Moderate / Urobili: 0.2 mg/dL   Blood: x / Protein: Trace mg/dL / Nitrite: Negative   Leuk Esterase: Trace / RBC: 28 /HPF / WBC 1 /HPF   Sq Epi: x / Non Sq Epi: 2 /HPF / Bacteria: Moderate /HPF        RADIOLOGY & ADDITIONAL TESTS:     Patient seen and examined at bedside. Patient states no abdominal pain. Pt out of bed to chair, voiding spontaneously.   Patient reports nausea and dry mouth; denies fever, chills, chest pain, shortness of breath, lightheadedness, dizziness.      T(F): 98.7 (10-23-23 @ 05:45), Max: 99.6 (10-22-23 @ 22:04)  HR: 97 (10-23-23 @ 06:00) (93 - 97)  BP: 104/53 (10-23-23 @ 06:00) (92/49 - 128/60)  RR: 28 (10-23-23 @ 06:00) (15 - 28)  SpO2: 94% (10-23-23 @ 06:00) (92% - 100%)  Wt(kg): --  I&O's Summary    22 Oct 2023 07:01  -  23 Oct 2023 07:00  --------------------------------------------------------  IN: 4073.4 mL / OUT: 3770 mL / NET: 303.4 mL    23 Oct 2023 07:01  -  23 Oct 2023 07:53  --------------------------------------------------------  IN: 147 mL / OUT: 0 mL / NET: 147 mL        MEDICATIONS  (STANDING):  chlorhexidine 2% Cloths 1 Application(s) Topical <User Schedule>  cholestyramine Powder (Sugar-Free) 4 Gram(s) Oral daily  diphenoxylate/atropine 2 Tablet(s) Oral every 6 hours  glucagon  Injectable 1 milliGRAM(s) IntraMuscular once  heparin   Injectable 5000 Unit(s) SubCutaneous every 8 hours  influenza  Vaccine (HIGH DOSE) 0.7 milliLiter(s) IntraMuscular once  levothyroxine Injectable 40 MICROGram(s) IV Push at bedtime  lipid, fat emulsion (Fish Oil and Plant Based) 20% Infusion 1.2 Gm/kG/Day (41.67 mL/Hr) IV Continuous <Continuous>  loperamide 4 milliGRAM(s) Oral every 6 hours  metoprolol tartrate Injectable 5 milliGRAM(s) IV Push every 6 hours  nystatin Powder 1 Application(s) Topical three times a day  opium Tincture 6 milliGRAM(s) Oral every 6 hours  Parenteral Nutrition - Adult 1 Each TPN Continuous <Continuous>    MEDICATIONS  (PRN):  ondansetron Injectable 4 milliGRAM(s) IV Push every 8 hours PRN Nausea and/or Vomiting      Physical Exam:  General: NAD  Neuro: A/O x3, CNs II-XII grossly intact, normal motor/sensation, no focal deficits  HEENT: NC/AT, EOMI, PERRLA, normal hearing, no oral lesions, neck supple w/o LAD  Pulmonary: Nonlabored breathing, no respiratory distress, CTA-B  Cardiovascular: NSR, no murmurs  Abdominal: soft, NT/ND, midline incision wound manager (Dustin pouch) in place draining green fluid. Right ileostomy with minimal brown liquid out put . Capped perc cony. Left JOYCE with minimal brown output.   Extremities: WWP, 5/5 strength x 4, no clubbing/cyanosis/edema  Pulses: palpable distal pulses  Skin: no rashes noted  MSK: No joint swelling  Psych: No signs of anxiety or depression    LABS:                        7.9    10.70 )-----------( 173      ( 23 Oct 2023 05:03 )             25.0     10-    134<L>  |  105  |  38<H>  ----------------------------<  118<H>  3.7   |  20<L>  |  1.39<H>    Ca    8.4      23 Oct 2023 05:03  Phos  3.4     10-  Mg     1.9     10-    TPro  7.3  /  Alb  2.2<L>  /  TBili  4.6<H>  /  DBili  x   /  AST  82<H>  /  ALT  79<H>  /  AlkPhos  129<H>  10-      Urinalysis Basic - ( 23 Oct 2023 05:47 )    Color: Dark Yellow / Appearance: Cloudy / S.018 / pH: x  Gluc: x / Ketone: Negative mg/dL  / Bili: Moderate / Urobili: 0.2 mg/dL   Blood: x / Protein: Trace mg/dL / Nitrite: Negative   Leuk Esterase: Trace / RBC: 28 /HPF / WBC 1 /HPF   Sq Epi: x / Non Sq Epi: 2 /HPF / Bacteria: Moderate /HPF        RADIOLOGY & ADDITIONAL TESTS:

## 2023-10-23 NOTE — PROGRESS NOTE ADULT - SUBJECTIVE AND OBJECTIVE BOX
INTERVAL HPI/OVERNIGHT EVENTS:  O/N: Having tremors--self limited, TSH wnl. 500 cc bolus to maintain net even. Cr 1.39 this AM - sent urine lytes.    STATUS POST:    : Laparoscopic lysis of adhesions, converted to open lysis of adhesions, SBR x 3, temporary abdominal closure, 5L cryst, 1L 5% alb, 3 pRBC, 1 FFP, 1 plts  : ex lap, removal of abthera, ileocolic resection, small bowel anastamosis, , 1.6 crystalloid, 250 5%, 175 uop   7/3: IR Gendel drained perihepatic aspiration of serous fluid.   : RTOR exlap, washout, ileocolic resection with end ileostomy, blow hole colostomy, fistula, red rubber from ileostomy to small bowel anastomosis; vicryl bridging mesh; R JOYCE below ileostomy, L JOYCE at small bowel enterotomy repair; 500 LR, 500 5% albumin, 3u PRBC, 2 FFP, 400 UOP,   : s/p perc cony    SUBJECTIVE:  Patient seen and examined by chief resident and team on AM rounds. Patient appears well without any acute complaints. Patient concerned regarding balancing hydration status, outputs, and CLD. Will continue to monitor I/Os closely.     MEDICATIONS  (STANDING):  chlorhexidine 2% Cloths 1 Application(s) Topical <User Schedule>  cholestyramine Powder (Sugar-Free) 4 Gram(s) Oral daily  diphenoxylate/atropine 2 Tablet(s) Oral every 6 hours  glucagon  Injectable 1 milliGRAM(s) IntraMuscular once  heparin   Injectable 5000 Unit(s) SubCutaneous every 8 hours  influenza  Vaccine (HIGH DOSE) 0.7 milliLiter(s) IntraMuscular once  levothyroxine Injectable 40 MICROGram(s) IV Push at bedtime  lipid, fat emulsion (Fish Oil and Plant Based) 20% Infusion 1.2 Gm/kG/Day (41.67 mL/Hr) IV Continuous <Continuous>  loperamide 4 milliGRAM(s) Oral every 6 hours  metoprolol tartrate Injectable 5 milliGRAM(s) IV Push every 6 hours  nystatin Powder 1 Application(s) Topical three times a day  opium Tincture 6 milliGRAM(s) Oral every 6 hours  Parenteral Nutrition - Adult 1 Each TPN Continuous <Continuous>    MEDICATIONS  (PRN):  ondansetron Injectable 4 milliGRAM(s) IV Push every 8 hours PRN Nausea and/or Vomiting      Vital Signs Last 24 Hrs  T(C): 37.4 (23 Oct 2023 09:30), Max: 37.6 (22 Oct 2023 22:04)  T(F): 99.3 (23 Oct 2023 09:30), Max: 99.6 (22 Oct 2023 22:04)  HR: 97 (23 Oct 2023 06:00) (93 - 97)  BP: 104/53 (23 Oct 2023 06:00) (92/49 - 128/60)  BP(mean): 76 (23 Oct 2023 06:00) (67 - 87)  RR: 28 (23 Oct 2023 06:00) (15 - 28)  SpO2: 94% (23 Oct 2023 06:00) (92% - 100%)    Parameters below as of 23 Oct 2023 06:00  Patient On (Oxygen Delivery Method): room air        PHYSICAL EXAM:  GEN: NAD, resting comfortably in bed.   CV: RRR, no m/r/g  PULM: Nonlabored breathing, no respiratory distress. Breathing comfortably on RA.   ABD: Soft, NTND abdomen. Wound manager in place draining bilious fluid. Wound continues to heal appropriately, with granulation tissue overlying wound. LUQ JOYCE in place, with grey thick output, more serousy this AM. R side ileostomy with red rubber in place, yellowish clear output.   EXTREM: WWP, no edema, SCDs in place  NEURO: AOX3.         I&O's Detail    22 Oct 2023 07:01  -  23 Oct 2023 07:00  --------------------------------------------------------  IN:    Fat Emulsion (Fish Oil &amp; Plant Based) 20% Infusion: 500.4 mL    Lactated Ringers Bolus: 500 mL    Oral Fluid: 720 mL    TPN (Total Parenteral Nutrition): 2353 mL  Total IN: 4073.4 mL    OUT:    Bulb (mL): 70 mL    Drain (mL): 2680 mL    Drain (mL): 10 mL    Emesis (mL): 30 mL    Voided (mL): 1130 mL  Total OUT: 3920 mL    Total NET: 153.4 mL      23 Oct 2023 07:01  -  23 Oct 2023 09:49  --------------------------------------------------------  IN:    TPN (Total Parenteral Nutrition): 294 mL  Total IN: 294 mL    OUT:    Bulb (mL): 0 mL    Drain (mL): 0 mL    Drain (mL): 100 mL  Total OUT: 100 mL    Total NET: 194 mL          LABS:                        7.9    10.70 )-----------( 173      ( 23 Oct 2023 05:03 )             25.0     10    134<L>  |  105  |  38<H>  ----------------------------<  118<H>  3.7   |  20<L>  |  1.39<H>    Ca    8.4      23 Oct 2023 05:03  Phos  3.4     10-  Mg     1.9     10-23    TPro  7.3  /  Alb  2.2<L>  /  TBili  4.6<H>  /  DBili  x   /  AST  82<H>  /  ALT  79<H>  /  AlkPhos  129<H>  10      Urinalysis Basic - ( 23 Oct 2023 05:47 )    Color: Dark Yellow / Appearance: Cloudy / S.018 / pH: x  Gluc: x / Ketone: Negative mg/dL  / Bili: Moderate / Urobili: 0.2 mg/dL   Blood: x / Protein: Trace mg/dL / Nitrite: Negative   Leuk Esterase: Trace / RBC: 28 /HPF / WBC 1 /HPF   Sq Epi: x / Non Sq Epi: 2 /HPF / Bacteria: Moderate /HPF        RADIOLOGY & ADDITIONAL STUDIES:

## 2023-10-23 NOTE — PROGRESS NOTE ADULT - ASSESSMENT
78 M w/ Crohn's, AFib/Flutter s/p DCCVs on amiodarone, remote ileocectomy and open appendectomy. Admitted (6/23) for SBO vs Crohns flare, s/p NGT decompression and s/p lap TRE converted to open TRE, SBR x 3, left in discontinuity with abthera vac on (6/27), RTOR for ileocolic resection, small bowel anastomosis, and abdominal wall closure on (6/28), c/b fluid collection s/p IR aspiration of perihepatic fluid on (7/3), c/b wound dehiscence s/p RTOR exlap, washout, ileocolic resection with end ileostomy, blow hole colostomy, red rubber from ileostomy to small bowel anastomosis; vicryl bridging mesh on (7/5) transferred to SICU postoperatively for hemodynamic monitoring, with hospital course complicated by periods of AMS most recently on 9/1 and associated with oliguria, severe ELVI and hyponatremia, which resolved but stepped back up for to SICU on 9/10 for acute AMS, intermittent hypoglycemia, AFib with RVR. Percutaneous Cholecystostomy placed on 9/11 for enlarged GB, PCT capped 10/5.    NEURO: AMS (resolved), EEG neg. Hx of depression - Effexor Dc'd on 9/10 due to delirium. Cont Melatonin prn Insomnia, Cont Zofran   HENT: Cepacol  CV: Hx Afib/flutter: s/p DCCV, Off amiodarone given transaminitis; Continue Metoprolol 5q6. Hx HLD: Lipitor Held given high LFTs. TTE  (7/18) - PASP 64mmHg, EF 65-70%,  Hx HTN -  holding Norvasc 10qd. Holding Losartan  Cont Lopressor 5 q6.   PULM: no resp distress on room air.   GI/FEN: Cont Ensure x 3/day. Cont TPN (2.5liters/day volume) for high ostomy and ECF output: Cont Octreotide in TPN, Imodium, Lomotil, Tincture of Opium. Transaminitis of unknown origin, now stable. s/p Perc Choley on 9/11 - tube capped 10/5. PPI. Significant drainge from fistula,  bolus as needed to keep I&O even. Cholestyramine started 10/18  : Voids, S/p ELVI resolved.  ENDO: No need to check ISS. Hx hypothyroid: IV Synthroid, TSH wnl on 10/23  ID: Numerous SICU admissions for sepsis. Currently off abx. Cont Nystatin powder // PREVIOUSLY had C. tertium, Lactobacillis from his IR cx 7/3, and candida albicans, lactobacillus, vanc sensitive E faecium, vanc resistant E gallinarum, vanc resistant E casseliflavus, lactobacillus from his OR cx 7/5. Completed course of abx with Imipenem (9/10-9/15). Imipenem (8/26--) imipenem (6/30-7/12, 7/23-7/30), Dapto (6/30-7/5 and 7/23-7/24). Empiric dapto (8/23-24) and cefepime (8/23-24).   PPX: SCDs, SQH  LINES: PIVs, LUE PICC (9/1- )  WOUNDS/DRAINS: Abdominal wound and fistula with wound manager in place dressing change T & F. Rt LQ Ostomy. JOYCE. IR PCT.   PT: Ambulate as tolerated   DISPO: SICU due to complicated hospital course. but will do vitals q4hrs (hold overnight), and CBC MWF, BMP daily in setting of high fistula output

## 2023-10-23 NOTE — PROGRESS NOTE ADULT - ASSESSMENT
78 M w/ Crohn's, AFib/Flutter s/p DCCVs on amiodarone, remote ileocectomy and open appendectomy. Admitted (6/23) for SBO vs Crohns flare, s/p NGT decompression and s/p lap TRE converted to open TRE, SBR x 3, left in discontinuity with abthera vac on (6/27), RTOR for ileocolic resection, small bowel anastomosis, and abdominal wall closure on (6/28), c/b fluid collection s/p IR aspiration of perihepatic fluid on (7/3), c/b wound dehiscence s/p RTOR exlap, washout, ileocolic resection with end ileostomy, blow hole colostomy, red rubber from ileostomy to small bowel anastomosis; vicryl bridging mesh on (7/5) transferred to SICU postoperatively for hemodynamic monitoring, with hospital course complicated by periods of AMS most recently on 9/1 and associated with oliguria, severe ELVI and hyponatremia, which resolved but stepped back up for to SICU on 9/10 for acute AMS, intermittent hypoglycemia, AFib with RVR. Percutaneous Cholecystostomy placed on 9/11 for enlarged GB, PCT capped 10/5.    Plan:  - C/w TPN  - Continue to monitor strict I&Os, bolus as needed for repletion  Team 4c will continue to follow, please call with any questions or concerns

## 2023-10-24 NOTE — ADVANCED PRACTICE NURSE CONSULT - ASSESSMENT
Remains on TPN with Ensure, otherwise NPO.     Abdomen:  10.5 x 6.5cm with 100% red granulation tissue with new epithelium to edges. Stomatized fistula at 2 o'clock with thin brown effluent. Periwound skin is intact without irritation. RLQ ileostomy stoma retracted, small less than 1 inch ovoid, peristomal skin intact. Stoma intubated with red rubber catheter.    Skin thoroughly cleansed.  Vashe soak applied to wound bed.  Cavilon No Sting Barrier film applied to periwound, Adapt ostomy rings, stoma paste applied around wound and within creases.  Entire area pouched with an [employer] wound manager # [id number].   2 piece 1 3/4" pouch with Adapt ring applied to RLQ Ileostomy.   Patient Will Remove Sutures At Home?: No

## 2023-10-24 NOTE — PROGRESS NOTE ADULT - ASSESSMENT
short bowel syndrome likely than Crohn's disease as CT does not show bowel wall thickening  candidate for [employer]  I am going to apply for authorization

## 2023-10-24 NOTE — PROGRESS NOTE ADULT - SUBJECTIVE AND OBJECTIVE BOX
INTERVAL HPI/OVERNIGHT EVENTS: AMRITA. Net positive throughout the night.     SUBJECTIVE: Patient seen and examined at bedside with chief resident. Patient states that he is feeling well with no acute complaints, currently denying nausea and vomiting. Ensure was decreased to one a day.      heparin   Injectable 5000 Unit(s) SubCutaneous every 8 hours  metoprolol tartrate Injectable 5 milliGRAM(s) IV Push every 6 hours      Vital Signs Last 24 Hrs  T(C): 36.6 (24 Oct 2023 01:15), Max: 37.4 (23 Oct 2023 09:30)  T(F): 97.8 (24 Oct 2023 01:15), Max: 99.3 (23 Oct 2023 09:30)  HR: 97 (24 Oct 2023 03:11) (94 - 97)  BP: 119/59 (24 Oct 2023 03:11) (97/55 - 134/61)  BP(mean): 85 (24 Oct 2023 03:11) (73 - 85)  RR: 16 (24 Oct 2023 03:11) (14 - 22)  SpO2: 97% (24 Oct 2023 03:11) (96% - 98%)    Parameters below as of 24 Oct 2023 03:11  Patient On (Oxygen Delivery Method): room air      I&O's Detail    22 Oct 2023 07:01  -  23 Oct 2023 07:00  --------------------------------------------------------  IN:    Fat Emulsion (Fish Oil &amp; Plant Based) 20% Infusion: 500.4 mL    Lactated Ringers Bolus: 500 mL    Oral Fluid: 720 mL    TPN (Total Parenteral Nutrition): 2353 mL  Total IN: 4073.4 mL    OUT:    Bulb (mL): 70 mL    Drain (mL): 2680 mL    Drain (mL): 10 mL    Emesis (mL): 30 mL    Voided (mL): 1130 mL  Total OUT: 3920 mL    Total NET: 153.4 mL      23 Oct 2023 07:01  -  24 Oct 2023 06:58  --------------------------------------------------------  IN:    Fat Emulsion (Fish Oil &amp; Plant Based) 20% Infusion: 500.4 mL    IV PiggyBack: 300 mL    Oral Fluid: 160 mL    Sodium Chloride 0.9% Bolus: 500 mL    TPN (Total Parenteral Nutrition): 2206 mL  Total IN: 3666.4 mL    OUT:    Bulb (mL): 0 mL    Drain (mL): 20 mL    Drain (mL): 915 mL    Voided (mL): 1575 mL  Total OUT: 2510 mL    Total NET: 1156.4 mL          General: NAD, resting comfortably in bed  C/V: NSR  Pulm: Nonlabored breathing, no respiratory distress  Abd: soft, NT/ND, midline wound manager with bilious output  Extrem: WWP, no edema, SCDs in place      LABS:                        7.9    10.70 )-----------( 173      ( 23 Oct 2023 05:03 )             25.0     10    134<L>  |  105  |  38<H>  ----------------------------<  118<H>  3.7   |  20<L>  |  1.39<H>    Ca    8.4      23 Oct 2023 05:03  Phos  3.4     10-23  Mg     1.9     10-23    TPro  7.3  /  Alb  2.2<L>  /  TBili  4.6<H>  /  DBili  x   /  AST  82<H>  /  ALT  79<H>  /  AlkPhos  129<H>  10-23      Urinalysis Basic - ( 23 Oct 2023 05:47 )    Color: Dark Yellow / Appearance: Cloudy / S.018 / pH: x  Gluc: x / Ketone: Negative mg/dL  / Bili: Moderate / Urobili: 0.2 mg/dL   Blood: x / Protein: Trace mg/dL / Nitrite: Negative   Leuk Esterase: Trace / RBC: 28 /HPF / WBC 1 /HPF   Sq Epi: x / Non Sq Epi: 2 /HPF / Bacteria: Moderate /HPF        RADIOLOGY & ADDITIONAL STUDIES:

## 2023-10-24 NOTE — PROGRESS NOTE ADULT - ASSESSMENT
78 M w/ Crohn's, AFib/Flutter s/p DCCVs on amiodarone, remote ileocectomy and open appendectomy. Admitted (6/23) for SBO vs Crohns flare, s/p NGT decompression and s/p lap TRE converted to open TRE, SBR x 3, left in discontinuity with abthera vac on (6/27), RTOR for ileocolic resection, small bowel anastomosis, and abdominal wall closure on (6/28), c/b fluid collection s/p IR aspiration of perihepatic fluid on (7/3), c/b wound dehiscence s/p RTOR exlap, washout, ileocolic resection with end ileostomy, blow hole colostomy, red rubber from ileostomy to small bowel anastomosis; vicryl bridging mesh on (7/5) transferred to SICU postoperatively for hemodynamic monitoring, with hospital course complicated by periods of AMS most recently on 9/1 and associated with oliguria, severe ELVI and hyponatremia, which resolved but stepped back up for to SICU on 9/10 for acute AMS, intermittent hypoglycemia, AFib with RVR. Percutaneous Cholecystostomy placed on 9/11 for enlarged GB, PCT capped 10/5.    NEURO: AMS (resolved), EEG neg. Hx of depression - Effexor Dc'd on 9/10 due to delirium. Cont Melatonin prn Insomnia, Cont Zofran   HENT: Cepacol  CV: Hx Afib/flutter: s/p DCCV, Off amiodarone given transaminitis; Continue Metoprolol 5q6. Hx HLD: Lipitor Held given high LFTs. TTE  (7/18) - PASP 64mmHg, EF 65-70%,  Hx HTN -  holding Norvasc 10qd. Holding Losartan  Cont Lopressor 5 q6.   PULM: no resp distress on room air.   GI/FEN: Cont Ensure max x1/day. Cont TPN (2.5liters/day volume) for high ostomy and ECF output: Cont Octreotide in TPN, Imodium, Lomotil, Tincture of Opium. Transaminitis of unknown origin, now stable. s/p Perc Choley on 9/11 - tube capped 10/5. PPI. Significant drainge from fistula,  bolus as needed to keep I&O even. Cholestyramine started 10/18. GI Following  : Voids, ELVI 10/23 (resolving)  ENDO: No need to check ISS. Hx hypothyroid: IV Synthroid, TSH wnl on 10/23  ID: Lekocytosis 10/23, afebrile; Numerous SICU admissions for sepsis. Currently off abx. Cont Nystatin powder // PREVIOUSLY had C. tertium, Lactobacillis from his IR cx 7/3, and candida albicans, lactobacillus, vanc sensitive E faecium, vanc resistant E gallinarum, vanc resistant E casseliflavus, lactobacillus from his OR cx 7/5. Completed course of abx with Imipenem (9/10-9/15). Imipenem (8/26--) imipenem (6/30-7/12, 7/23-7/30), Dapto (6/30-7/5 and 7/23-7/24). Empiric dapto (8/23-24) and cefepime (8/23-24).   PPX: SCDs, SQH  LINES: PIVs, LUE PICC (9/1- )  WOUNDS/DRAINS: Abdominal wound and fistula with wound manager in place dressing change T & F. RLQ Ostomy. JOYCE. IR PCT.   PT: Ambulate as tolerated   DISPO: SICU due to complicated hospital course; Vitals q4hrs (hold overnight), and CBC MWF, BMP QD in setting of high fistula output

## 2023-10-24 NOTE — PROGRESS NOTE ADULT - ASSESSMENT
78 M w/ Crohn's, AFib/Flutter s/p DCCVs on amiodarone, remote ileocectomy and open appendectomy. Admitted (6/23) for SBO vs Crohns flare, s/p NGT decompression and s/p lap TRE converted to open TRE, SBR x 3, left in discontinuity with abthera vac on (6/27), RTOR for ileocolic resection, small bowel anastomosis, and abdominal wall closure on (6/28), c/b fluid collection s/p IR aspiration of perihepatic fluid on (7/3), c/b wound dehiscence s/p RTOR exlap, washout, ileocolic resection with end ileostomy, blow hole colostomy, red rubber from ileostomy to small bowel anastomosis; vicryl bridging mesh on (7/5) transferred to SICU postoperatively for hemodynamic monitoring, with hospital course complicated by periods of AMS most recently on 9/1 and associated with oliguria, severe ELVI and hyponatremia, which resolved but now stepped back up for to SICU on 9/10 for acute AMS, intermittent hypoglycemia, AFib with RVR. Perc Choley tube placed on 9/11 for enlarged GB.     Recommendations:  Strict I/Os - replete as needed  Monitor lytes  Continue max opium tincture  Continue TPN  Encourage OOB/ambulation  Continue Cholestyramine  Continue: octreotide, lomotil, imodium  Continue wound care evaluation   Rest of care per SICU team    Team 5c will continue to follow   78 M w/ Crohn's, AFib/Flutter s/p DCCVs on amiodarone, remote ileocectomy and open appendectomy. Admitted (6/23) for SBO vs Crohns flare, s/p NGT decompression and s/p lap TRE converted to open TRE, SBR x 3, left in discontinuity with abthera vac on (6/27), RTOR for ileocolic resection, small bowel anastomosis, and abdominal wall closure on (6/28), c/b fluid collection s/p IR aspiration of perihepatic fluid on (7/3), c/b wound dehiscence s/p RTOR exlap, washout, ileocolic resection with end ileostomy, blow hole colostomy, red rubber from ileostomy to small bowel anastomosis; vicryl bridging mesh on (7/5) transferred to SICU postoperatively for hemodynamic monitoring, with hospital course complicated by periods of AMS most recently on 9/1 and associated with oliguria, severe ELVI and hyponatremia, which resolved but now stepped back up for to SICU on 9/10 for acute AMS, intermittent hypoglycemia, AFib with RVR. Perc Choley tube placed on 9/11 for enlarged GB.     Recommendations:  Continue max 1 ensure/day  Strict I/Os - replete as needed  Monitor lytes  Continue max opium tincture  Continue TPN  Encourage OOB/ambulation  Continue Cholestyramine  Continue: octreotide, lomotil, imodium  Continue wound care evaluation   Rest of care per SICU team    Team 5c will continue to follow

## 2023-10-24 NOTE — CHART NOTE - NSCHARTNOTEFT_GEN_A_CORE
Admitting Diagnosis:   Patient is a 78y old  Male who presents with a chief complaint of SBO (24 Oct 2023 08:07)      PAST MEDICAL & SURGICAL HISTORY:  Essential hypertension  Hypertension      Atrial fibrillation  s/p cardioversion 2010 and 2014  Pt. reports 4 DCCV  Now on Amiodarone 200mg PO bid and Eliquis 5mg PO bid  Last DCCV 4 yrs ago at [facility]      Crohn's disease  s/p partial resection of ileum      Hyperlipidemia      Hypothyroidism      History of depression  On Venlafaxine ER 150mg PO bid      Junctional rhythm      H/O knee surgery      History of cataract surgery          Current Nutrition Order:   TPN- 411g Dex, 144g AA, 100g lipids; provides 2973.4 kcals, 144g protein, GIR 3.35 mg/kg/min  PO- NPO, allowed 1 Ensure Max daily; provides 150 kcals, 30g protein    PO Intake: Good (%) [ X ]  Fair (50-75%) [   ] Poor (<25%) [   ]    GI Issues:  ileostomy, large output fistula, see subjective below    Pain: no pain/discomfort noted    Skin Integrity: abd wound [10.6 x 6.5cm] and fistula with wound manager in place, RLQ ileostomy, L JOYCE drain, R heel DTI, skin tear below ostomy. Rduy score 19    Labs:   10-24    138  |  105  |  38<H>  ----------------------------<  127<H>  4.2   |  24  |  1.05    Ca    8.4      24 Oct 2023 05:30  Phos  3.4     10-23  Mg     1.9     10-23    TPro  x   /  Alb  x   /  TBili  x   /  DBili  3.8<H>  /  AST  x   /  ALT  x   /  AlkPhos  x   10-24    CAPILLARY BLOOD GLUCOSE          Medications:  MEDICATIONS  (STANDING):  chlorhexidine 2% Cloths 1 Application(s) Topical <User Schedule>  cholestyramine Powder (Sugar-Free) 4 Gram(s) Oral daily  diphenoxylate/atropine 2 Tablet(s) Oral every 6 hours  glucagon  Injectable 1 milliGRAM(s) IntraMuscular once  heparin   Injectable 5000 Unit(s) SubCutaneous every 8 hours  influenza  Vaccine (HIGH DOSE) 0.7 milliLiter(s) IntraMuscular once  levothyroxine Injectable 40 MICROGram(s) IV Push at bedtime  lipid, fat emulsion (Fish Oil and Plant Based) 20% Infusion 1.2 Gm/kG/Day (41.67 mL/Hr) IV Continuous <Continuous>  loperamide 4 milliGRAM(s) Oral every 6 hours  metoprolol tartrate Injectable 5 milliGRAM(s) IV Push every 6 hours  nystatin Powder 1 Application(s) Topical three times a day  opium Tincture 6 milliGRAM(s) Oral every 6 hours  Parenteral Nutrition - Adult 1 Each TPN Continuous <Continuous>  Parenteral Nutrition - Adult 1 Each TPN Continuous <Continuous>    MEDICATIONS  (PRN):  ondansetron Injectable 4 milliGRAM(s) IV Push every 8 hours PRN Nausea and/or Vomiting      Weight:  Daily 85.2kg [24 Oct 2023]  Daily 85.2kg [20 Oct 2023]  Daily 81.9kg [18 Oct 2023]  Daily 85.2kg [16 Oct 2023]  Daily   95.2kg [12 Oct 2023]   Daily 87.7kg [11 Oct 2023]  Daily 87.4kg [09 Oct 2023]  Daily 86.4kg [07 Oct 2023]  Daily 82.5kg [06 Oct 2023]  Daily 82.6kg [4 Oct 2023]   Daily 83.5kg [03 Oct 2023]  Daily 84.5kg [2 Oct 2023]  Daily 87.2kg [1 Oct 2023]  Daily 88.3kg [29 Sep 2023]  Daily 89.9kg [28 Sep 2023]  Daily 87.7kg [24 Sep 2023]  Daily 90.4kg [21 Sep 2023]  Daily 91.4kg [20 Sep 2023]  Daily 86.7kg [18 Sep 2023]  Daily 88.1kg [16 Sep 2023]  Daily 88.9kg [15 Sep 2023]   Daily 89.1 [14 Sep 2023]  Daily 92.9kg [6 Sep 2023]  Daily 91.8kg [27 Aug 2023]  Daily 101kg [9 Aug 2023]  Daily   102.1kg [10 July 2023]  Daily 99.9kg [9 July 2023]    Weight Change: weight trending down throughout admission. Recommend continue weekly/daily weights for trending & ensuring adequacy of nutrition provision    IBW: 86.4kg   % IBW: 98.6%    Estimated energy needs:   Current body wt [85.2kg] used for energy calculations as pt <100% IBW. Needs estimated for age and adjusted for current clinical status, increased needs for post-op & open abd wound healing    Calories: 4499-8404 kcals based on 30-40 kcals/kg  Protein: 127.8-170.4 g protein based on 1.5-2.0g protein/kg + additional depending on outputs  *Fluid needs per team    Subjective:   78 M w/ Crohn's, AFib/Flutter s/p DCCVs on amiodarone, remote ileocectomy and open appendectomy. Admitted (6/23) for SBO vs Crohns flare, s/p NGT decompression and s/p lap TRE converted to open TRE, SBR x 3, left in discontinuity with abthera vac on (6/27), RTOR for ileocolic resection, small bowel anastomosis, and abdominal wall closure on (6/28), c/b fluid collection s/p IR aspiration of perihepatic fluid on (7/3), c/b wound dehiscence s/p RTOR exlap, washout, ileocolic resection with end ileostomy, blow hole colostomy, red rubber from ileostomy to small bowel anastomosis; vicryl bridging mesh on (7/5) transferred to SICU postoperatively for hemodynamic monitoring, with hospital course complicated by periods of AMS most recently on 9/1 and associated with oliguria, severe ELVI and hyponatremia, which resolved but stepped back up for to SICU on 9/10 for acute AMS, intermittent hypoglycemia, AFib with RVR. Percutaneous Cholecystostomy placed on 9/11 for enlarged GB, PCT capped 10/5.    Chart reviewed. Pt seen today on 5 EA with IDT during AM rounds, on room air. TPN remains primary means to nutrition- current provision provides  34.9 kcals/kg, 28.1 non-protein kcal/kg, 1.7g protein/kg. NPO, allowed for Ensure daily. Changed yesterday from Ensure Clear TID to Ensure Max once daily due to high output- lower sugar content and osmolality may improve tolerance & output. Output x 24hrs: 1140ml mid abd wound + 60ml RLQ drain/ostomy, 10ml LUQ JOYCE drain. Will continue close monitoring of I&Os. Noted with mild jaundice today, continue cycling TPN x 18hrs, consider decreasing duration. Holding manganese & copper in TPN bag, pending repeat serum levels. Consider supplementation 2-3x per week if levels remain low to promote wound healing. Pt with concerns regarding PO diet- has been researching nutrition options, requesting consideration of bites of protein foods due to potential absorption proximal to fistula. Discussed with team- to continue only Ensure as PO for now. Labs reviewed- direct bili 3.8 <H>, BUN 38 <H>, Creat 1.05, total bili 4.6 <H>, AST 82/ALT 79 <H>,  <H>. Meds: opium tincture Q6hrs, cholestyramine, lomotil, synthroid [administer 30-60 minutes before food; separate at least 4hrs from calcium or iron-containing products or bile acid sequestrants], imodium Q6hrs, zofran. RDN will continue to monitor, reassess, and intervene as appropriate.     Previous Nutrition Diagnosis:  Increased Nutrient Needs R/T physiological demands for nutrient AEB post-op wound healing    Active [ X  ]  Resolved [   ]    If resolved, new PES:     Goal:  Pt will meet at least 75% of protein & energy needs via most appropriate route for nutrition     Recommendations:  1. c/w TPN via PICC as primary means to nutrition- 411g Dex, 144g AA, 100g Lipids; provides 2973.4 kcals, 144g protein, GIR 3.35 mg/kg/min   - provides 34.9 kcals/kg, 28.1 non-protein kcals/kg, 1.7g protein/kg IBW  - consider decreasing lipids if worsening LFTs  - cycle TPN over 18hrs [6pm-12pm], consider 12-16hrs  - MVI, selenium, zinc in bag  - monitor weights & wound healing, adjust substrates as indicated  - pending copper, manganese levels- adjust in bag prn [consider giving 2-3x per week]  2. Fluid & lytes per team  - additional fluid bolus prn  3. PO diet per team/MD discretion  - Ensure Max daily; provides 150 kcals, 30g protein [low sugar, high protein content]  - advance/hold PO as indicated  4. Weekly lipid panel   - hold/decrease lipids if TG >400  5. Monitor BMP/Mg/Phos, POC BG  - monitor & replenish lytes outside TPN bag prn  6. Continue close monitoring of clinical course & adjust recommendations prn  7. Monitor feasibility for advancing PO diet    Education: current nutrition provision     Risk Level: High [ X  ] Moderate [   ] Low [   ]

## 2023-10-24 NOTE — PROGRESS NOTE ADULT - SUBJECTIVE AND OBJECTIVE BOX
Patient seen and examined at bedside. Patient able to get out of bed to chair, tolerating EnsureMax x1 each day, urinating adequately.   Patient denies fever, chills, chest pain, shortness of breath, abdominal pain, nausea, vomiting, lightheadedness, dizziness.    He is interested in pursuing possible oral intake of solid food, citing "maybe we could try a tablespoon here and there of a food that is absorbed high up," citing specific examples "like cottage cheese, greek yogurt."    T(F): 97.8 (10-24-23 @ 05:21), Max: 99.3 (10-23-23 @ 09:30)  HR: 98 (10-24-23 @ 06:00) (94 - 98)  BP: 120/58 (10-24-23 @ 06:00) (97/55 - 134/61)  RR: 16 (10-24-23 @ 06:00) (14 - 22)  SpO2: 99% (10-24-23 @ 06:00) (96% - 99%)  Wt(kg): --  I&O's Summary    23 Oct 2023 07:01  -  24 Oct 2023 07:00  --------------------------------------------------------  IN: 4060.4 mL / OUT: 2985 mL / NET: 1075.4 mL    24 Oct 2023 07:01  -  24 Oct 2023 08:08  --------------------------------------------------------  IN: 147 mL / OUT: 110 mL / NET: 37 mL        MEDICATIONS  (STANDING):  chlorhexidine 2% Cloths 1 Application(s) Topical <User Schedule>  cholestyramine Powder (Sugar-Free) 4 Gram(s) Oral daily  diphenoxylate/atropine 2 Tablet(s) Oral every 6 hours  glucagon  Injectable 1 milliGRAM(s) IntraMuscular once  heparin   Injectable 5000 Unit(s) SubCutaneous every 8 hours  influenza  Vaccine (HIGH DOSE) 0.7 milliLiter(s) IntraMuscular once  levothyroxine Injectable 40 MICROGram(s) IV Push at bedtime  lipid, fat emulsion (Fish Oil and Plant Based) 20% Infusion 1.2 Gm/kG/Day (41.67 mL/Hr) IV Continuous <Continuous>  loperamide 4 milliGRAM(s) Oral every 6 hours  metoprolol tartrate Injectable 5 milliGRAM(s) IV Push every 6 hours  nystatin Powder 1 Application(s) Topical three times a day  opium Tincture 6 milliGRAM(s) Oral every 6 hours  Parenteral Nutrition - Adult 1 Each TPN Continuous <Continuous>    MEDICATIONS  (PRN):  ondansetron Injectable 4 milliGRAM(s) IV Push every 8 hours PRN Nausea and/or Vomiting      Physical Exam:  General: NAD  Neuro: A/O x3, CNs II-XII grossly intact, normal motor/sensation, no focal deficits  HEENT: NC/AT, EOMI, PERRLA, normal hearing, no oral lesions, neck supple w/o LAD  Pulmonary: Nonlabored breathing, no respiratory distress, CTA-B  Cardiovascular: NSR, no murmurs  Abdominal: soft, NT/ND, midline incision wound manager (Dustin pouch) in place draining green fluid. Right ileostomy with minimal brown liquid out put . Capped perc cony. Left JOYCE with minimal brown output.   Extremities: WWP, 5/5 strength x 4, no clubbing/cyanosis/edema  Pulses: palpable distal pulses  Skin: mild jaundice; no rashes noted  MSK: No joint swelling  Psych: No signs of anxiety or depression        LABS:                        7.9    10.70 )-----------( 173      ( 23 Oct 2023 05:03 )             25.0     10-24    138  |  105  |  38<H>  ----------------------------<  127<H>  4.2   |  24  |  1.05    Ca    8.4      24 Oct 2023 05:30  Phos  3.4     10-23  Mg     1.9     10-23    TPro  7.3  /  Alb  2.2<L>  /  TBili  4.6<H>  /  DBili  x   /  AST  82<H>  /  ALT  79<H>  /  AlkPhos  129<H>  10-23      Urinalysis Basic - ( 24 Oct 2023 05:30 )    Color: x / Appearance: x / SG: x / pH: x  Gluc: 127 mg/dL / Ketone: x  / Bili: x / Urobili: x   Blood: x / Protein: x / Nitrite: x   Leuk Esterase: x / RBC: x / WBC x   Sq Epi: x / Non Sq Epi: x / Bacteria: x        RADIOLOGY & ADDITIONAL TESTS:

## 2023-10-24 NOTE — PROGRESS NOTE ADULT - SUBJECTIVE AND OBJECTIVE BOX
Pt seen and examined   patient known to me since admission  updated myself on hospital course    REVIEW OF SYSTEMS:  Constitutional: No fever,   Cardiovascular: No chest pain, palpitations,   Gastrointestinal: No abdominal or epigastric pain. No nausea, vomiting or hematemesis; + diarrhea  Skin: No itching, burning, rashes or lesions       MEDICATIONS:  MEDICATIONS  (STANDING):  chlorhexidine 2% Cloths 1 Application(s) Topical <User Schedule>  cholestyramine Powder (Sugar-Free) 4 Gram(s) Oral daily  diphenoxylate/atropine 2 Tablet(s) Oral every 6 hours  glucagon  Injectable 1 milliGRAM(s) IntraMuscular once  heparin   Injectable 5000 Unit(s) SubCutaneous every 8 hours  influenza  Vaccine (HIGH DOSE) 0.7 milliLiter(s) IntraMuscular once  levothyroxine Injectable 40 MICROGram(s) IV Push at bedtime  lipid, fat emulsion (Fish Oil and Plant Based) 20% Infusion 1.2 Gm/kG/Day (41.67 mL/Hr) IV Continuous <Continuous>  lipid, fat emulsion (Fish Oil and Plant Based) 20% Infusion 1.2 Gm/kG/Day (41.67 mL/Hr) IV Continuous <Continuous>  loperamide 4 milliGRAM(s) Oral every 6 hours  metoprolol tartrate Injectable 5 milliGRAM(s) IV Push every 6 hours  nystatin Powder 1 Application(s) Topical three times a day  opium Tincture 6 milliGRAM(s) Oral every 6 hours  Parenteral Nutrition - Adult 1 Each TPN Continuous <Continuous>  Parenteral Nutrition - Adult 1 Each TPN Continuous <Continuous>    MEDICATIONS  (PRN):  ondansetron Injectable 4 milliGRAM(s) IV Push every 8 hours PRN Nausea and/or Vomiting      Allergies    penicillin (Angioedema)    Intolerances        Vital Signs Last 24 Hrs  T(C): 37.5 (24 Oct 2023 09:16), Max: 37.5 (24 Oct 2023 09:16)  T(F): 99.5 (24 Oct 2023 09:16), Max: 99.5 (24 Oct 2023 09:16)  HR: 98 (24 Oct 2023 06:00) (94 - 98)  BP: 120/58 (24 Oct 2023 06:00) (97/55 - 134/61)  BP(mean): 83 (24 Oct 2023 06:00) (73 - 85)  RR: 16 (24 Oct 2023 06:00) (14 - 22)  SpO2: 99% (24 Oct 2023 06:00) (96% - 99%)    Parameters below as of 24 Oct 2023 06:00  Patient On (Oxygen Delivery Method): room air        10-23 @ 07:01  -  10-24 @ 07:00  --------------------------------------------------------  IN: 4060.4 mL / OUT: 2985 mL / NET: 1075.4 mL    10-24 @ 07:01  -  10-24 @ 09:58  --------------------------------------------------------  IN: 147 mL / OUT: 110 mL / NET: 37 mL        PHYSICAL EXAM:    General: ; in no acute distress  HEENT: MMM, conjunctiva and sclera clear  Gastrointestinal: Soft non-tender non-distended; Normal bowel sounds; ileostomy, fistula large output   Skin: Warm and dry. No obvious rash    LABS:      CBC Full  -  ( 23 Oct 2023 05:03 )  WBC Count : 10.70 K/uL  RBC Count : 2.52 M/uL  Hemoglobin : 7.9 g/dL  Hematocrit : 25.0 %  Platelet Count - Automated : 173 K/uL  Mean Cell Volume : 99.2 fl  Mean Cell Hemoglobin : 31.3 pg  Mean Cell Hemoglobin Concentration : 31.6 gm/dL  Auto Neutrophil # : 10.33 K/uL  Auto Lymphocyte # : 0.28 K/uL  Auto Monocyte # : 0.00 K/uL  Auto Eosinophil # : 0.10 K/uL  Auto Basophil # : 0.00 K/uL  Auto Neutrophil % : 90.3 %  Auto Lymphocyte % : 2.6 %  Auto Monocyte % : 0.0 %  Auto Eosinophil % : 0.9 %  Auto Basophil % : 0.0 %    10-24    138  |  105  |  38<H>  ----------------------------<  127<H>  4.2   |  24  |  1.05    Ca    8.4      24 Oct 2023 05:30  Phos  3.4     10-23  Mg     1.9     10-23    TPro  7.3  /  Alb  2.2<L>  /  TBili  4.6<H>  /  DBili  x   /  AST  82<H>  /  ALT  79<H>  /  AlkPhos  129<H>  10-23          Urinalysis Basic - ( 24 Oct 2023 05:30 )    Color: x / Appearance: x / SG: x / pH: x  Gluc: 127 mg/dL / Ketone: x  / Bili: x / Urobili: x   Blood: x / Protein: x / Nitrite: x   Leuk Esterase: x / RBC: x / WBC x   Sq Epi: x / Non Sq Epi: x / Bacteria: x                RADIOLOGY & ADDITIONAL STUDIES (The following images were personally reviewed):

## 2023-10-25 NOTE — PROGRESS NOTE ADULT - SUBJECTIVE AND OBJECTIVE BOX
outputs down  Cr has normalized  AVSS  Bilirubin UP  UO stable  will discuss with other consultants

## 2023-10-25 NOTE — PROGRESS NOTE ADULT - ASSESSMENT
78 M w/ Crohn's, AFib/Flutter s/p DCCVs on amiodarone, remote ileocectomy and open appendectomy. Admitted (6/23) for SBO vs Crohns flare, s/p NGT decompression and s/p lap TRE converted to open TRE, SBR x 3, left in discontinuity with abthera vac on (6/27), RTOR for ileocolic resection, small bowel anastomosis, and abdominal wall closure on (6/28), c/b fluid collection s/p IR aspiration of perihepatic fluid on (7/3), c/b wound dehiscence s/p RTOR exlap, washout, ileocolic resection with end ileostomy, blow hole colostomy, red rubber from ileostomy to small bowel anastomosis; vicryl bridging mesh on (7/5) transferred to SICU postoperatively for hemodynamic monitoring, with hospital course complicated by periods of AMS most recently on 9/1 and associated with oliguria, severe ELVI and hyponatremia, which resolved but now stepped back up for to SICU on 9/10 for acute AMS, intermittent hypoglycemia, AFib with RVR. Perc Deb tube placed on 9/11 for enlarged GB.     Recommendations:  NPO w/ TF w/ max 1 ensure/day  Strict I/Os - replete as needed  Monitor lytes  Continue max opium tincture  Continue TPN  Encourage OOB/ambulation  Continue Cholestyramine  Continue: lomotil, imodium  Continue wound care evaluation   Rest of care per SICU team    Team 5c will continue to follow

## 2023-10-25 NOTE — PROGRESS NOTE ADULT - ASSESSMENT
78 M w/ Crohn's, AFib/Flutter s/p DCCVs on amiodarone, remote ileocectomy and open appendectomy. Admitted (6/23) for SBO vs Crohns flare, s/p NGT decompression and s/p lap TRE converted to open TRE, SBR x 3, left in discontinuity with abthera vac on (6/27), RTOR for ileocolic resection, small bowel anastomosis, and abdominal wall closure on (6/28), c/b fluid collection s/p IR aspiration of perihepatic fluid on (7/3), c/b wound dehiscence s/p RTOR exlap, washout, ileocolic resection with end ileostomy, blow hole colostomy, red rubber from ileostomy to small bowel anastomosis; vicryl bridging mesh on (7/5) transferred to SICU postoperatively for hemodynamic monitoring, with hospital course complicated by periods of AMS most recently on 9/1 and associated with oliguria, severe ELVI and hyponatremia, which resolved but stepped back up for to SICU on 9/10 for acute AMS, intermittent hypoglycemia, AFib with RVR. Percutaneous Cholecystostomy placed on 9/11 for enlarged GB, PCT capped 10/5.    Plan:  - Uncap per cony tube 2/2 rising T bili  - C/w TPN  - Continue to monitor strict I&Os, bolus as needed for repletion  - Wound manager changes as per wound care nursing staff  Team 4c will continue to follow, please call with any questions or concerns

## 2023-10-25 NOTE — PROGRESS NOTE ADULT - SUBJECTIVE AND OBJECTIVE BOX
Patient seen and examined at bedside. Patient denies fever, chills, chest pain, shortness of breath, nausea, vomiting, lightheadedness, dizziness.      T(F): 97.6 (10-25-23 @ 11:18), Max: 98.8 (10-25-23 @ 07:12)  HR: 94 (10-25-23 @ 11:30) (89 - 97)  BP: 113/63 (10-25-23 @ 11:30) (113/63 - 141/65)  RR: 22 (10-25-23 @ 11:30) (15 - 22)  SpO2: 97% (10-25-23 @ 11:30) (94% - 98%)  Wt(kg): --  I&O's Summary    24 Oct 2023 07:01  -  25 Oct 2023 07:00  --------------------------------------------------------  IN: 3580.3 mL / OUT: 2420 mL / NET: 1160.3 mL    25 Oct 2023 07:01  -  25 Oct 2023 13:01  --------------------------------------------------------  IN: 735 mL / OUT: 963 mL / NET: -228 mL        MEDICATIONS  (STANDING):  chlorhexidine 2% Cloths 1 Application(s) Topical <User Schedule>  cholestyramine Powder (Sugar-Free) 4 Gram(s) Oral daily  diphenoxylate/atropine 2 Tablet(s) Oral every 6 hours  glucagon  Injectable 1 milliGRAM(s) IntraMuscular once  heparin   Injectable 5000 Unit(s) SubCutaneous every 8 hours  influenza  Vaccine (HIGH DOSE) 0.7 milliLiter(s) IntraMuscular once  levothyroxine Injectable 40 MICROGram(s) IV Push at bedtime  lipid, fat emulsion (Fish Oil and Plant Based) 20% Infusion 1.2 Gm/kG/Day (41.67 mL/Hr) IV Continuous <Continuous>  loperamide 4 milliGRAM(s) Oral every 6 hours  metoprolol tartrate Injectable 5 milliGRAM(s) IV Push every 6 hours  nystatin Powder 1 Application(s) Topical three times a day  opium Tincture 6 milliGRAM(s) Oral every 6 hours  Parenteral Nutrition - Adult 1 Each TPN Continuous <Continuous>  Parenteral Nutrition - Adult 1 Each TPN Continuous <Continuous>  sodium chloride 0.9% Bolus 250 milliLiter(s) IV Bolus once    MEDICATIONS  (PRN):  ondansetron Injectable 4 milliGRAM(s) IV Push every 8 hours PRN Nausea and/or Vomiting      Physical Exam:  General: NAD  Neuro: A/O x3, CNs II-XII grossly intact, normal motor/sensation, no focal deficits  HEENT: NC/AT, EOMI, PERRLA, normal hearing, no oral lesions, neck supple w/o LAD  Pulmonary: Nonlabored breathing, no respiratory distress, CTA-B  Cardiovascular: NSR, no murmurs  Abdominal: soft, NT/ND, midline incision wound manager (Dustin pouch) in place draining green fluid. Right ileostomy with minimal brown liquid out put . Capped perc cony. Left JOYCE with minimal brown output.   Extremities: WWP, 5/5 strength x 4, no clubbing/cyanosis/edema  Pulses: palpable distal pulses  Skin: mild jaundice; no rashes noted  MSK: No joint swelling  Psych: No signs of anxiety or depression      LABS:                        7.3    10.26 )-----------( 167      ( 25 Oct 2023 05:30 )             24.3     10-25    135  |  104  |  36<H>  ----------------------------<  120<H>  4.3   |  25  |  1.02    Ca    8.4      25 Oct 2023 05:30  Phos  2.9     10-25  Mg     2.0     10-25    TPro  7.3  /  Alb  2.0<L>  /  TBili  5.9<H>  /  DBili  4.2<H>  /  AST  80<H>  /  ALT  71<H>  /  AlkPhos  127<H>  10-25      Urinalysis Basic - ( 25 Oct 2023 05:30 )    Color: x / Appearance: x / SG: x / pH: x  Gluc: 120 mg/dL / Ketone: x  / Bili: x / Urobili: x   Blood: x / Protein: x / Nitrite: x   Leuk Esterase: x / RBC: x / WBC x   Sq Epi: x / Non Sq Epi: x / Bacteria: x        RADIOLOGY & ADDITIONAL TESTS:

## 2023-10-25 NOTE — PROGRESS NOTE ADULT - SUBJECTIVE AND OBJECTIVE BOX
Pt seen and examined   he says he feels well  small output from mucus fistula  biliary draining bili 1.7    REVIEW OF SYSTEMS:  Constitutional: No   Cardiovascular: No chest pain, palpitations,  Gastrointestinal: No abdominal or epigastric pain. No nausea, vomiting or hematemesis; No diarrhea or constipation. No melena or hematochezia.  Skin: No itching, burning, rashes or lesions       MEDICATIONS:  MEDICATIONS  (STANDING):  chlorhexidine 2% Cloths 1 Application(s) Topical <User Schedule>  cholestyramine Powder (Sugar-Free) 4 Gram(s) Oral daily  diphenoxylate/atropine 2 Tablet(s) Oral every 6 hours  glucagon  Injectable 1 milliGRAM(s) IntraMuscular once  heparin   Injectable 5000 Unit(s) SubCutaneous every 8 hours  influenza  Vaccine (HIGH DOSE) 0.7 milliLiter(s) IntraMuscular once  levothyroxine Injectable 40 MICROGram(s) IV Push at bedtime  lipid, fat emulsion (Fish Oil and Plant Based) 20% Infusion 1.2 Gm/kG/Day (41.67 mL/Hr) IV Continuous <Continuous>  loperamide 4 milliGRAM(s) Oral every 6 hours  metoprolol tartrate Injectable 5 milliGRAM(s) IV Push every 6 hours  nystatin Powder 1 Application(s) Topical three times a day  opium Tincture 6 milliGRAM(s) Oral every 6 hours  Parenteral Nutrition - Adult 1 Each TPN Continuous <Continuous>  Parenteral Nutrition - Adult 1 Each TPN Continuous <Continuous>    MEDICATIONS  (PRN):  ondansetron Injectable 4 milliGRAM(s) IV Push every 8 hours PRN Nausea and/or Vomiting      Allergies    penicillin (Angioedema)    Intolerances        Vital Signs Last 24 Hrs  T(C): 36.4 (25 Oct 2023 11:18), Max: 37.1 (25 Oct 2023 07:12)  T(F): 97.6 (25 Oct 2023 11:18), Max: 98.8 (25 Oct 2023 07:12)  HR: 94 (25 Oct 2023 11:30) (89 - 97)  BP: 113/63 (25 Oct 2023 11:30) (113/63 - 141/65)  BP(mean): 82 (25 Oct 2023 11:30) (74 - 95)  RR: 22 (25 Oct 2023 11:30) (15 - 22)  SpO2: 97% (25 Oct 2023 11:30) (94% - 98%)    Parameters below as of 25 Oct 2023 11:30  Patient On (Oxygen Delivery Method): room air        10-24 @ 07:01  -  10-25 @ 07:00  --------------------------------------------------------  IN: 3580.3 mL / OUT: 2420 mL / NET: 1160.3 mL    10-25 @ 07:01  -  10-25 @ 11:56  --------------------------------------------------------  IN: 441 mL / OUT: 783 mL / NET: -342 mL        PHYSICAL EXAM:    General:  in no acute distress  HEENT: MMM, conjunctiva and sclera clear  Gastrointestinal: Soft non-tender non-distended; Normal bowel sounds; ileostomy, mucus fistula, cholecystostomy  Skin: Warm and dry. No obvious rash    LABS:      CBC Full  -  ( 25 Oct 2023 05:30 )  WBC Count : 10.26 K/uL  RBC Count : 2.37 M/uL  Hemoglobin : 7.3 g/dL  Hematocrit : 24.3 %  Platelet Count - Automated : 167 K/uL  Mean Cell Volume : 102.5 fl  Mean Cell Hemoglobin : 30.8 pg  Mean Cell Hemoglobin Concentration : 30.0 gm/dL  Auto Neutrophil # : 8.22 K/uL  Auto Lymphocyte # : 0.84 K/uL  Auto Monocyte # : 0.66 K/uL  Auto Eosinophil # : 0.37 K/uL  Auto Basophil # : 0.05 K/uL  Auto Neutrophil % : 80.1 %  Auto Lymphocyte % : 8.2 %  Auto Monocyte % : 6.4 %  Auto Eosinophil % : 3.6 %  Auto Basophil % : 0.5 %    10-25    135  |  104  |  36<H>  ----------------------------<  120<H>  4.3   |  25  |  1.02    Ca    8.4      25 Oct 2023 05:30  Phos  2.9     10-25  Mg     2.0     10-25    TPro  7.3  /  Alb  2.0<L>  /  TBili  5.9<H>  /  DBili  4.2<H>  /  AST  80<H>  /  ALT  71<H>  /  AlkPhos  127<H>  10-25          Urinalysis Basic - ( 25 Oct 2023 05:30 )    Color: x / Appearance: x / SG: x / pH: x  Gluc: 120 mg/dL / Ketone: x  / Bili: x / Urobili: x   Blood: x / Protein: x / Nitrite: x   Leuk Esterase: x / RBC: x / WBC x   Sq Epi: x / Non Sq Epi: x / Bacteria: x                RADIOLOGY & ADDITIONAL STUDIES (The following images were personally reviewed):

## 2023-10-25 NOTE — CONSULT NOTE ADULT - ASSESSMENT
Assessment: 78 M w/ Crohn's, AFib/Flutter s/p DCCVs on amiodarone, remote ileocectomy and open appendectomy. Admitted (6/23) for SBO vs Crohns flare, s/p NGT decompression and s/p lap TRE converted to open TRE, SBR x 3, left in discontinuity with abthera vac on (6/27), RTOR for ileocolic resection, small bowel anastomosis, and abdominal wall closure on (6/28), c/b fluid collection s/p IR aspiration of perihepatic fluid on (7/3), c/b wound dehiscence s/p RTOR exlap, washout, ileocolic resection with end ileostomy, blow hole colostomy, red rubber from ileostomy to small bowel anastomosis; vicryl bridging mesh on (7/5) transferred to SICU postoperatively for hemodynamic monitoring, with hospital course complicated by periods of AMS most recently on 9/1 and associated with oliguria, severe ELVI and hyponatremia, which resolved but now stepped back up for to SICU on 9/10 for acute AMS, intermittent hypoglycemia, AFib with RVR. Perc Deb tube placed on 9/11 for enlarged GB, capped by surgical team 10/4. Since then, bili uptrending, 5/9 today. Surgical team uncapped perc deb revealing 100cc bilious output. IR consulted for perc deb tube check. Case reviewed with Dr. Andres, plan for procedure with local anesthesia.     Communicated with: primary team

## 2023-10-25 NOTE — PROGRESS NOTE ADULT - ASSESSMENT
78 M w/ Crohn's, AFib/Flutter s/p DCCVs on amiodarone, remote ileocectomy and open appendectomy. Admitted (6/23) for SBO vs Crohns flare, s/p NGT decompression and s/p lap TRE converted to open TRE, SBR x 3, left in discontinuity with abthera vac on (6/27), RTOR for ileocolic resection, small bowel anastomosis, and abdominal wall closure on (6/28), c/b fluid collection s/p IR aspiration of perihepatic fluid on (7/3), c/b wound dehiscence s/p RTOR exlap, washout, ileocolic resection with end ileostomy, blow hole colostomy, red rubber from ileostomy to small bowel anastomosis; vicryl bridging mesh on (7/5) transferred to SICU postoperatively for hemodynamic monitoring, with hospital course complicated by periods of AMS most recently on 9/1 and associated with oliguria, severe ELVI and hyponatremia, which resolved but stepped back up for to SICU on 9/10 for acute AMS, intermittent hypoglycemia, AFib with RVR. Percutaneous Cholecystostomy placed on 9/11 for enlarged GB, PCT capped 10/5.    NEURO: AMS (resolved), EEG neg. Hx of depression - Effexor Dc'd on 9/10 due to delirium. Cont Melatonin prn Insomnia, Cont Zofran   HENT: Cepacol  CV: Hx Afib/flutter: s/p DCCV, Off amiodarone given transaminitis; Continue Metoprolol 5q6. Hx HLD: Lipitor Held given high LFTs. TTE  (7/18) - PASP 64mmHg, EF 65-70%,  Hx HTN -  holding Norvasc 10qd. Holding Losartan  Cont Lopressor 5 q6.   PULM: no resp distress on room air.   GI/FEN: Cont Ensure max x1/day. Cont TPN (2.5liters/day volume) for high ostomy and ECF output: Cont Octreotide in TPN, Imodium, Lomotil, Tincture of Opium. Transaminitis of unknown origin, now stable. s/p Perc Choley on 9/11 - tube capped 10/5. PPI. Significant drainge from fistula,  bolus as needed to keep I&O even. Cholestyramine started 10/18. GI Consulted   : Voids, ELVI 10/23, resolved.  ENDO: No need to check ISS. Hx hypothyroid: IV Synthroid, TSH wnl on 10/23  ID: Lekocytosis 10/23, afebrile; Numerous SICU admissions for sepsis. Currently off abx. Cont Nystatin powder // PREVIOUSLY had C. tertium, Lactobacillis from his IR cx 7/3, and candida albicans, lactobacillus, vanc sensitive E faecium, vanc resistant E gallinarum, vanc resistant E casseliflavus, lactobacillus from his OR cx 7/5. Completed course of abx with Imipenem (9/10-9/15). Imipenem (8/26--) imipenem (6/30-7/12, 7/23-7/30), Dapto (6/30-7/5 and 7/23-7/24). Empiric dapto (8/23-24) and cefepime (8/23-24).   PPX: SCDs, SQH  LINES: PIVs, LUE PICC (9/1- )  WOUNDS/DRAINS: Abdominal wound and fistula with wound manager in place dressing change T & F. RLQ Ostomy. JOYCE. IR PCT.   PT: Ambulate as tolerated   DISPO: SICU due to complicated hospital course. but will do vitals q4hrs (hold overnight), and CBC MWF, BMP QD in setting of high fistula output      10/25: CMP with LFTS downtrending, DBili 5.9 (uptrending), otherwise wnl; CBC w/ Leukocytosis resolved, Hgb 7.3 (prev 7.9, 8.5); Lipid panel with  (stable); Net positive 1L at signout. TPN ordered. Uncap T-tube. Macrocytic anemia, follow B12, Folate. NS 250cc bolus to maintain net positive.    Primary To-Do List:  [ ] F/u with Lab/Nutrtion re copper level - Pending (at [facility] [phone])  [ ] F/u GI Dr. Yuan, requesting Gattex (Teduglutide)    [ ] F/u repeat cbc, cmp @12, B12, Folate  [ ] F/u IR tube study

## 2023-10-25 NOTE — PROGRESS NOTE ADULT - PROVIDER SPECIALTY LIST ADULT
Surgery Pt seen in office with recurrence of effusion and hypoxia.  Sent for ER eval.  Will need pigtail drainage of effusion.  Likely pleurx in near future.

## 2023-10-25 NOTE — PROGRESS NOTE ADULT - SUBJECTIVE AND OBJECTIVE BOX
SUBJECTIVE: Pt seen and examined at bedside this am by surgery team. No acute complaints. Pain well controlled. Denies f/n/v/cp/sob.    MEDICATIONS  (STANDING):  chlorhexidine 2% Cloths 1 Application(s) Topical <User Schedule>  cholestyramine Powder (Sugar-Free) 4 Gram(s) Oral daily  diphenoxylate/atropine 2 Tablet(s) Oral every 6 hours  glucagon  Injectable 1 milliGRAM(s) IntraMuscular once  heparin   Injectable 5000 Unit(s) SubCutaneous every 8 hours  influenza  Vaccine (HIGH DOSE) 0.7 milliLiter(s) IntraMuscular once  levothyroxine Injectable 40 MICROGram(s) IV Push at bedtime  lipid, fat emulsion (Fish Oil and Plant Based) 20% Infusion 1.2 Gm/kG/Day (41.67 mL/Hr) IV Continuous <Continuous>  loperamide 4 milliGRAM(s) Oral every 6 hours  metoprolol tartrate Injectable 5 milliGRAM(s) IV Push every 6 hours  nystatin Powder 1 Application(s) Topical three times a day  opium Tincture 6 milliGRAM(s) Oral every 6 hours  Parenteral Nutrition - Adult 1 Each TPN Continuous <Continuous>    MEDICATIONS  (PRN):  ondansetron Injectable 4 milliGRAM(s) IV Push every 8 hours PRN Nausea and/or Vomiting      Vital Signs Last 24 Hrs  T(C): 36.6 (25 Oct 2023 05:29), Max: 37.5 (24 Oct 2023 09:16)  T(F): 97.8 (25 Oct 2023 05:29), Max: 99.5 (24 Oct 2023 09:16)  HR: 97 (25 Oct 2023 06:00) (89 - 97)  BP: 137/66 (25 Oct 2023 06:00) (114/53 - 141/65)  BP(mean): 95 (25 Oct 2023 06:00) (74 - 95)  RR: 20 (25 Oct 2023 06:00) (15 - 28)  SpO2: 97% (25 Oct 2023 06:00) (94% - 98%)    Parameters below as of 25 Oct 2023 06:00  Patient On (Oxygen Delivery Method): room air    PHYSICAL EXAM:    Constitutional: A&Ox3, NAD    Respiratory: non labored breathing, no respiratory distress    Cardiovascular: NSR, RRR    Gastrointestinal: abdomen soft, NT/ND, midline wound manager with bilious output    Extremities: WWP, no edema, SCDs in place      I&O's Detail    24 Oct 2023 07:01  -  25 Oct 2023 07:00  --------------------------------------------------------  IN:    Fat Emulsion (Fish Oil &amp; Plant Based) 20% Infusion: 500.3 mL    Oral Fluid: 580 mL    TPN (Total Parenteral Nutrition): 2500 mL  Total IN: 3580.3 mL    OUT:    Bulb (mL): 5 mL    Drain (mL): 20 mL    Drain (mL): 720 mL    Voided (mL): 1675 mL  Total OUT: 2420 mL    Total NET: 1160.3 mL          LABS:                        7.3    10.26 )-----------( 167      ( 25 Oct 2023 05:30 )             24.3     10-25    135  |  104  |  36<H>  ----------------------------<  120<H>  4.3   |  25  |  1.02    Ca    8.4      25 Oct 2023 05:30  Phos  2.9     10-25  Mg     2.0     10-25    TPro  7.3  /  Alb  2.0<L>  /  TBili  5.9<H>  /  DBili  x   /  AST  80<H>  /  ALT  71<H>  /  AlkPhos  127<H>  10-25      Urinalysis Basic - ( 25 Oct 2023 05:30 )    Color: x / Appearance: x / SG: x / pH: x  Gluc: 120 mg/dL / Ketone: x  / Bili: x / Urobili: x   Blood: x / Protein: x / Nitrite: x   Leuk Esterase: x / RBC: x / WBC x   Sq Epi: x / Non Sq Epi: x / Bacteria: x        RADIOLOGY & ADDITIONAL STUDIES:

## 2023-10-25 NOTE — PROGRESS NOTE ADULT - SUBJECTIVE AND OBJECTIVE BOX
INTERVAL HPI/OVERNIGHT EVENTS:  O/N: AMRITA.    STATUS POST:    6/27: Laparoscopic lysis of adhesions, converted to open lysis of adhesions, SBR x 3, temporary abdominal closure, 5L cryst, 1L 5% alb, 3 pRBC, 1 FFP, 1 plts  6/28: ex lap, removal of abthera, ileocolic resection, small bowel anastamosis, , 1.6 crystalloid, 250 5%, 175 uop   7/3: IR Gendel drained perihepatic aspiration of serous fluid.   7/5: RTOR exlap, washout, ileocolic resection with end ileostomy, blow hole colostomy, fistula, red rubber from ileostomy to small bowel anastomosis; vicryl bridging mesh; R JOYCE below ileostomy, L JOYCE at small bowel enterotomy repair; 500 LR, 500 5% albumin, 3u PRBC, 2 FFP, 400 UOP,   9/11: s/p perc cony    SUBJECTIVE:  Patient seen and examined by chief resident and team on AM rounds. Patient without any acute complaints or issues overnight. T bili continues to rise in the 5s, will uncap per cony tube.     MEDICATIONS  (STANDING):  chlorhexidine 2% Cloths 1 Application(s) Topical <User Schedule>  cholestyramine Powder (Sugar-Free) 4 Gram(s) Oral daily  diphenoxylate/atropine 2 Tablet(s) Oral every 6 hours  glucagon  Injectable 1 milliGRAM(s) IntraMuscular once  heparin   Injectable 5000 Unit(s) SubCutaneous every 8 hours  influenza  Vaccine (HIGH DOSE) 0.7 milliLiter(s) IntraMuscular once  levothyroxine Injectable 40 MICROGram(s) IV Push at bedtime  lipid, fat emulsion (Fish Oil and Plant Based) 20% Infusion 1.2 Gm/kG/Day (41.67 mL/Hr) IV Continuous <Continuous>  loperamide 4 milliGRAM(s) Oral every 6 hours  metoprolol tartrate Injectable 5 milliGRAM(s) IV Push every 6 hours  nystatin Powder 1 Application(s) Topical three times a day  opium Tincture 6 milliGRAM(s) Oral every 6 hours  Parenteral Nutrition - Adult 1 Each TPN Continuous <Continuous>  Parenteral Nutrition - Adult 1 Each TPN Continuous <Continuous>    MEDICATIONS  (PRN):  ondansetron Injectable 4 milliGRAM(s) IV Push every 8 hours PRN Nausea and/or Vomiting      Vital Signs Last 24 Hrs  T(C): 36.4 (25 Oct 2023 11:18), Max: 37.1 (25 Oct 2023 07:12)  T(F): 97.6 (25 Oct 2023 11:18), Max: 98.8 (25 Oct 2023 07:12)  HR: 94 (25 Oct 2023 11:30) (89 - 97)  BP: 113/63 (25 Oct 2023 11:30) (113/63 - 141/65)  BP(mean): 82 (25 Oct 2023 11:30) (82 - 95)  RR: 22 (25 Oct 2023 11:30) (15 - 22)  SpO2: 97% (25 Oct 2023 11:30) (94% - 98%)    Parameters below as of 25 Oct 2023 11:30  Patient On (Oxygen Delivery Method): room air        PHYSICAL EXAM:  GEN: NAD, resting comfortably in bed.   CV: RRR, no m/r/g  PULM: Nonlabored breathing, no respiratory distress. Breathing comfortably on RA.   ABD: Soft, NTND abdomen. Wound manager in place draining bilious fluid. Wound continues to heal appropriately, with granulation tissue overlying wound. LUQ JOYCE in place, with grey thick output, more serousy this AM. R side ileostomy with red rubber in place, yellowish clear output.   EXTREM: WWP, no edema, SCDs in place  NEURO: AOX3.           I&O's Detail    24 Oct 2023 07:01  -  25 Oct 2023 07:00  --------------------------------------------------------  IN:    Fat Emulsion (Fish Oil &amp; Plant Based) 20% Infusion: 500.3 mL    Oral Fluid: 580 mL    TPN (Total Parenteral Nutrition): 2500 mL  Total IN: 3580.3 mL    OUT:    Bulb (mL): 5 mL    Drain (mL): 20 mL    Drain (mL): 720 mL    Voided (mL): 1675 mL  Total OUT: 2420 mL    Total NET: 1160.3 mL      25 Oct 2023 07:01  -  25 Oct 2023 15:37  --------------------------------------------------------  IN:    TPN (Total Parenteral Nutrition): 882 mL  Total IN: 882 mL    OUT:    Drain (mL): 153 mL    Drain (mL): 225 mL    Voided (mL): 660 mL  Total OUT: 1038 mL    Total NET: -156 mL          LABS:                        7.4    10.32 )-----------( 172      ( 25 Oct 2023 12:24 )             23.6     10-25    136  |  103  |  34<H>  ----------------------------<  121<H>  4.6   |  25  |  1.01    Ca    8.2<L>      25 Oct 2023 12:24  Phos  2.9     10-25  Mg     2.0     10-25    TPro  7.4  /  Alb  2.2<L>  /  TBili  6.3<H>  /  DBili  x   /  AST  76<H>  /  ALT  76<H>  /  AlkPhos  132<H>  10-25      Urinalysis Basic - ( 25 Oct 2023 12:24 )    Color: x / Appearance: x / SG: x / pH: x  Gluc: 121 mg/dL / Ketone: x  / Bili: x / Urobili: x   Blood: x / Protein: x / Nitrite: x   Leuk Esterase: x / RBC: x / WBC x   Sq Epi: x / Non Sq Epi: x / Bacteria: x        RADIOLOGY & ADDITIONAL STUDIES:

## 2023-10-25 NOTE — CONSULT NOTE ADULT - SUBJECTIVE AND OBJECTIVE BOX
78 M w/ Crohn's, AFib/Flutter s/p DCCVs on amiodarone, remote ileocectomy and open appendectomy. Admitted (6/23) for SBO vs Crohns flare, s/p NGT decompression and s/p lap TRE converted to open TRE, SBR x 3, left in discontinuity with abthera vac on (6/27), RTOR for ileocolic resection, small bowel anastomosis, and abdominal wall closure on (6/28), c/b fluid collection s/p IR aspiration of perihepatic fluid on (7/3), c/b wound dehiscence s/p RTOR exlap, washout, ileocolic resection with end ileostomy, blow hole colostomy, red rubber from ileostomy to small bowel anastomosis; vicryl bridging mesh on (7/5) transferred to SICU postoperatively for hemodynamic monitoring, with hospital course complicated by periods of AMS most recently on 9/1 and associated with oliguria, severe ELVI and hyponatremia, which resolved but now stepped back up for to SICU on 9/10 for acute AMS, intermittent hypoglycemia, AFib with RVR. Perc Deb tube placed on 9/11 for enlarged GB, capped by surgical team 10/4. Since then, bili uptrending, 5/9 today. Surgical team uncapped perc deb revealing 100cc bilious output. IR consulted for perc deb tube check.     Clinical History: SBO (SMALL BOWEL OBSTRUCTION)    Handoff    MEWS Score    Essential hypertension    Atrial fibrillation    Crohn's disease    Hyperlipidemia    Hypothyroidism    ELVI (acute kidney injury)    History of depression    Junctional rhythm    Small bowel obstruction    Ileocolic anastomotic leak    Small bowel anastomotic leak    Small bowel obstruction    Ileocolic anastomotic leak    Small bowel anastomotic leak    Exploratory laparotomy    SBO (small bowel obstruction)    Laparoscopic lysis of intestinal adhesions    Exploratory laparotomy    Open lysis of intestinal adhesions    Resection of small bowel    Temporary closure of abdominal cavity    Repair, anastomosis, ileocolic    Small bowel resection with anastomosis    Flap, myocutaneous, abdominal wall    Reconstruction of abdominal wall using mesh    Washout of abdominal cavity    Creation of ileostomy    Creation of colostomy    Other specified personal history presenting hazards to health    H/O knee surgery    History of cataract surgery    ABDO PAIN    90+    Room Service Assist    Room Service Assist    Laparoscopic lysis of intestinal adhesions    Open lysis of intestinal adhesions    Resection of small bowel    Temporary closure of abdominal cavity    Repair, anastomosis, ileocolic    Small bowel resection with anastomosis    Flap, myocutaneous, abdominal wall    Reconstruction of abdominal wall using mesh    Washout of abdominal cavity    [email or web address]        Meds:chlorhexidine 2% Cloths 1 Application(s) Topical <User Schedule>  cholestyramine Powder (Sugar-Free) 4 Gram(s) Oral daily  diphenoxylate/atropine 2 Tablet(s) Oral every 6 hours  glucagon  Injectable 1 milliGRAM(s) IntraMuscular once  heparin   Injectable 5000 Unit(s) SubCutaneous every 8 hours  influenza  Vaccine (HIGH DOSE) 0.7 milliLiter(s) IntraMuscular once  levothyroxine Injectable 40 MICROGram(s) IV Push at bedtime  lipid, fat emulsion (Fish Oil and Plant Based) 20% Infusion 1.2 Gm/kG/Day IV Continuous <Continuous>  lipid, fat emulsion (Fish Oil and Plant Based) 20% Infusion 1.2 Gm/kG/Day IV Continuous <Continuous>  loperamide 4 milliGRAM(s) Oral every 6 hours  metoprolol tartrate Injectable 5 milliGRAM(s) IV Push every 6 hours  nystatin Powder 1 Application(s) Topical three times a day  ondansetron Injectable 4 milliGRAM(s) IV Push every 8 hours PRN  opium Tincture 6 milliGRAM(s) Oral every 6 hours  Parenteral Nutrition - Adult 1 Each TPN Continuous <Continuous>  Parenteral Nutrition - Adult 1 Each TPN Continuous <Continuous>      Allergies:penicillin (Angioedema)        Labs:                           7.3    10.26 )-----------( 167      ( 25 Oct 2023 05:30 )             24.3       10-25    135  |  104  |  36<H>  ----------------------------<  120<H>  4.3   |  25  |  1.02    Ca    8.4      25 Oct 2023 05:30  Phos  2.9     10-25  Mg     2.0     10-25    TPro  7.3  /  Alb  2.0<L>  /  TBili  5.9<H>  /  DBili  4.2<H>  /  AST  80<H>  /  ALT  71<H>  /  AlkPhos  127<H>  10-25          Imaging Findings: reviewed

## 2023-10-26 NOTE — PROGRESS NOTE ADULT - SUBJECTIVE AND OBJECTIVE BOX
Patient seen and examined at bedside. Patient denies abdominal pain. Patient urinating adequately.   Patient denies fever, chills, chest pain, shortness of breath, nausea, vomiting, lightheadedness, dizziness.    Patient requesting small oral intake but expresses understanding it may take some time before advancing diet.    T(F): 97.8 (10-26-23 @ 05:15), Max: 97.8 (10-26-23 @ 05:15)  HR: 96 (10-26-23 @ 06:00) (90 - 96)  BP: 116/89 (10-26-23 @ 06:00) (105/57 - 134/70)  RR: 19 (10-26-23 @ 06:00) (12 - 32)  SpO2: 96% (10-26-23 @ 06:00) (95% - 98%)  Wt(kg): --  I&O's Summary    25 Oct 2023 07:01  -  26 Oct 2023 07:00  --------------------------------------------------------  IN: 4428.7 mL / OUT: 3778 mL / NET: 650.7 mL    26 Oct 2023 07:01  -  26 Oct 2023 08:20  --------------------------------------------------------  IN: 147 mL / OUT: 0 mL / NET: 147 mL        MEDICATIONS  (STANDING):  chlorhexidine 2% Cloths 1 Application(s) Topical <User Schedule>  cholestyramine Powder (Sugar-Free) 4 Gram(s) Oral daily  diphenoxylate/atropine 2 Tablet(s) Oral every 6 hours  glucagon  Injectable 1 milliGRAM(s) IntraMuscular once  heparin   Injectable 5000 Unit(s) SubCutaneous every 8 hours  influenza  Vaccine (HIGH DOSE) 0.7 milliLiter(s) IntraMuscular once  levothyroxine Injectable 40 MICROGram(s) IV Push at bedtime  lipid, fat emulsion (Fish Oil and Plant Based) 20% Infusion 1.2 Gm/kG/Day (41.67 mL/Hr) IV Continuous <Continuous>  lipid, fat emulsion (Fish Oil and Plant Based) 20% Infusion 1.2 Gm/kG/Day (41.67 mL/Hr) IV Continuous <Continuous>  loperamide 4 milliGRAM(s) Oral every 6 hours  metoprolol tartrate Injectable 5 milliGRAM(s) IV Push every 6 hours  nystatin Powder 1 Application(s) Topical three times a day  opium Tincture 6 milliGRAM(s) Oral every 6 hours  Parenteral Nutrition - Adult 1 Each TPN Continuous <Continuous>  Parenteral Nutrition - Adult 1 Each TPN Continuous <Continuous>    MEDICATIONS  (PRN):  LORazepam   Injectable 0.5 milliGRAM(s) IV Push at bedtime PRN Anxiety  ondansetron Injectable 4 milliGRAM(s) IV Push every 8 hours PRN Nausea and/or Vomiting      Physical Exam:  General: NAD  Neuro: A/O x3, CNs II-XII grossly intact, normal motor/sensation, no focal deficits  HEENT: NC/AT, EOMI, PERRLA, normal hearing, no oral lesions, neck supple w/o LAD  Pulmonary: Nonlabored breathing, no respiratory distress, CTA-B  Cardiovascular: NSR, no murmurs  Abdominal: soft, NT/ND, midline incision wound manager (Dustin pouch) in place draining green fluid. Right ileostomy with minimal brown liquid out put . Capped perc cony. Left JOYCE with minimal brown output.   Extremities: WWP, 5/5 strength x 4, no clubbing/cyanosis/edema  Pulses: palpable distal pulses  Skin: mild jaundice; no rashes noted  MSK: No joint swelling  Psych: No signs of anxiety or depression    LABS:                        7.5    10.93 )-----------( 172      ( 26 Oct 2023 05:30 )             24.4     10-26    136  |  103  |  36<H>  ----------------------------<  112<H>  4.3   |  26  |  0.98    Ca    8.6      26 Oct 2023 05:30  Phos  3.3     10-26  Mg     2.3     10-26    TPro  7.6  /  Alb  2.3<L>  /  TBili  5.5<H>  /  DBili  4.1<H>  /  AST  75<H>  /  ALT  72<H>  /  AlkPhos  128<H>  10-26      Urinalysis Basic - ( 26 Oct 2023 05:30 )    Color: x / Appearance: x / SG: x / pH: x  Gluc: 112 mg/dL / Ketone: x  / Bili: x / Urobili: x   Blood: x / Protein: x / Nitrite: x   Leuk Esterase: x / RBC: x / WBC x   Sq Epi: x / Non Sq Epi: x / Bacteria: x        RADIOLOGY & ADDITIONAL TESTS:

## 2023-10-26 NOTE — PROGRESS NOTE ADULT - ASSESSMENT
78 M w/ Crohn's, AFib/Flutter s/p DCCVs on amiodarone, remote ileocectomy and open appendectomy. Admitted (6/23) for SBO vs Crohns flare, s/p NGT decompression and s/p lap TRE converted to open TRE, SBR x 3, left in discontinuity with abthera vac on (6/27), RTOR for ileocolic resection, small bowel anastomosis, and abdominal wall closure on (6/28), c/b fluid collection s/p IR aspiration of perihepatic fluid on (7/3), c/b wound dehiscence s/p RTOR exlap, washout, ileocolic resection with end ileostomy, blow hole colostomy, red rubber from ileostomy to small bowel anastomosis; vicryl bridging mesh on (7/5) transferred to SICU postoperatively for hemodynamic monitoring, with hospital course complicated by periods of AMS most recently on 9/1 and associated with oliguria, severe ELVI and hyponatremia, which resolved but stepped back up for to SICU on 9/10 for acute AMS, intermittent hypoglycemia, AFib with RVR. Percutaneous Cholecystostomy placed on 9/11 for enlarged GB, PCT capped 10/5.    Plan:  - Trend LFTs, keep perc cony uncapped  - C/w TPN  - Continue to monitor strict I&Os, bolus as needed for repletion  - Gattex authorization, however, do not start Gattex without multidisciplinary discussion  - Wound manager changes as per wound care nursing staff (next Friday 10/27)  Team 4c will continue to follow, please call with any questions or concerns

## 2023-10-26 NOTE — PROGRESS NOTE ADULT - ASSESSMENT
78 M w/ Crohn's, AFib/Flutter s/p DCCVs on amiodarone, remote ileocectomy and open appendectomy. Admitted (6/23) for SBO vs Crohns flare, s/p NGT decompression and s/p lap TRE converted to open TRE, SBR x 3, left in discontinuity with abthera vac on (6/27), RTOR for ileocolic resection, small bowel anastomosis, and abdominal wall closure on (6/28), c/b fluid collection s/p IR aspiration of perihepatic fluid on (7/3), c/b wound dehiscence s/p RTOR exlap, washout, ileocolic resection with end ileostomy, blow hole colostomy, red rubber from ileostomy to small bowel anastomosis; vicryl bridging mesh on (7/5) transferred to SICU postoperatively for hemodynamic monitoring, with hospital course complicated by periods of AMS most recently on 9/1 and associated with oliguria, severe ELVI and hyponatremia, which resolved but stepped back up for to SICU on 9/10 for acute AMS, intermittent hypoglycemia, AFib with RVR. Percutaneous Cholecystostomy placed on 9/11 for enlarged GB, PCT capped 10/5.    NEURO: AMS (resolved), EEG neg. Hx of depression - Effexor Dc'd on 9/10 due to delirium. Cont Melatonin prn Insomnia, Cont Zofran. Lorazepam prn.  HENT: Cepacol  CV: Hx Afib/flutter: s/p DCCV, Off amiodarone given transaminitis; Continue Metoprolol 5q6. Hx HLD: Lipitor Held given high LFTs. TTE  (7/18) - PASP 64mmHg, EF 65-70%,  Hx HTN -  holding Norvasc 10qd. Holding Losartan  Cont Lopressor 5 q6.   PULM: no resp distress on room air.   GI/FEN: Cont Ensure max x1/day. Cont TPN (2.5liters/day volume) for high ostomy and ECF output: Cont Octreotide in TPN, Imodium, Lomotil, Tincture of Opium. Transaminitis of unknown origin, now stable. s/p Perc Choley on 9/11 - tube capped 10/5. PPI. Significant drainge from fistula,  bolus as needed to keep I&O even. Cholestyramine started 10/18. GI following, considering Gattex    : Voids  ENDO: No need to check ISS. Hx hypothyroid: IV Synthroid, TSH wnl on 10/23  ID: Lekocytosis 10/26, afebrile. Numerous SICU admissions for sepsis. Currently off abx. Cont Nystatin powder // PREVIOUSLY had C. tertium, Lactobacillis from his IR cx 7/3, and candida albicans, lactobacillus, vanc sensitive E faecium, vanc resistant E gallinarum, vanc resistant E casseliflavus, lactobacillus from his OR cx 7/5. Completed course of abx with Imipenem (9/10-9/15). Imipenem (8/26--) imipenem (6/30-7/12, 7/23-7/30), Dapto (6/30-7/5 and 7/23-7/24). Empiric dapto (8/23-24) and cefepime (8/23-24).   PPX: SCDs, SQH  LINES: PIVs, LUE PICC (9/1- )  WOUNDS/DRAINS: Abdominal wound and fistula with wound manager in place dressing change T & F. RLQ Ostomy. JOYCE. IR PCT.   PT: Ambulate as tolerated   DISPO: SICU due to complicated hospital course. but will do vitals q4hrs (hold overnight), and CBC MWF, BMP QD in setting of high fistula output    Over Morning Events:  10/26: Hgb stable. Electrolytes wnl. LFTs downtrending. Tbili/Dbili downtrending. NS 500cc bolus to maintain net positive.    Primary To-do List:  [ ] TPN/lipids daily (Octreotide, Lipids @100g and protein) 2.5L volume per day. Trig weekly. EnsureMax 1/day  [ ] Labs M/W/F / BMP QD  [ ] Wound manager over wound changed Tu/F  [ ] F/u with Lab/Nutrtion re copper level - Pending (at [facility] [phone])  [ ] F/u GI Dr. Yuan, requesting Gattex (Teduglutide), waiting for auth  [ ] F/u VitB12, Folate levels for macrocytic anemia  [ ] Continue to trend bilirubinemia, perc cony drainage, signs/symptoms

## 2023-10-26 NOTE — PROGRESS NOTE ADULT - SUBJECTIVE AND OBJECTIVE BOX
INTERVAL HPI/OVERNIGHT EVENTS:  AMRITA overnight.     STATUS POST:    6/27: Laparoscopic lysis of adhesions, converted to open lysis of adhesions, SBR x 3, temporary abdominal closure, 5L cryst, 1L 5% alb, 3 pRBC, 1 FFP, 1 plts  6/28: ex lap, removal of abthera, ileocolic resection, small bowel anastamosis, , 1.6 crystalloid, 250 5%, 175 uop   7/3: IR Gendel drained perihepatic aspiration of serous fluid.   7/5: RTOR exlap, washout, ileocolic resection with end ileostomy, blow hole colostomy, fistula, red rubber from ileostomy to small bowel anastomosis; vicryl bridging mesh; R JOYCE below ileostomy, L JOYCE at small bowel enterotomy repair; 500 LR, 500 5% albumin, 3u PRBC, 2 FFP, 400 UOP,   9/11: s/p perc cony    SUBJECTIVE:  Patient seen and examined by chief resident and team on AM rounds. Patients T bili downtrending this morning 5.5 (6.3) after uncapping per cony tube yesterday. LFTs downtrending as well. Patient reports he has been drinking less, with midline entero-atmospheric fistula with decreased output 210/488cc.     MEDICATIONS  (STANDING):  chlorhexidine 2% Cloths 1 Application(s) Topical <User Schedule>  cholestyramine Powder (Sugar-Free) 4 Gram(s) Oral daily  diphenoxylate/atropine 2 Tablet(s) Oral every 6 hours  glucagon  Injectable 1 milliGRAM(s) IntraMuscular once  heparin   Injectable 5000 Unit(s) SubCutaneous every 8 hours  influenza  Vaccine (HIGH DOSE) 0.7 milliLiter(s) IntraMuscular once  levothyroxine Injectable 40 MICROGram(s) IV Push at bedtime  lipid, fat emulsion (Fish Oil and Plant Based) 20% Infusion 1.2 Gm/kG/Day (41.67 mL/Hr) IV Continuous <Continuous>  lipid, fat emulsion (Fish Oil and Plant Based) 20% Infusion 1.2 Gm/kG/Day (41.67 mL/Hr) IV Continuous <Continuous>  loperamide 4 milliGRAM(s) Oral every 6 hours  metoprolol tartrate Injectable 5 milliGRAM(s) IV Push every 6 hours  nystatin Powder 1 Application(s) Topical three times a day  opium Tincture 6 milliGRAM(s) Oral every 6 hours  Parenteral Nutrition - Adult 1 Each TPN Continuous <Continuous>  Parenteral Nutrition - Adult 1 Each TPN Continuous <Continuous>    MEDICATIONS  (PRN):  LORazepam   Injectable 0.5 milliGRAM(s) IV Push at bedtime PRN Anxiety  ondansetron Injectable 4 milliGRAM(s) IV Push every 8 hours PRN Nausea and/or Vomiting      Vital Signs Last 24 Hrs  T(C): 36.6 (26 Oct 2023 05:15), Max: 36.6 (26 Oct 2023 05:15)  T(F): 97.8 (26 Oct 2023 05:15), Max: 97.8 (26 Oct 2023 05:15)  HR: 96 (26 Oct 2023 06:00) (90 - 96)  BP: 116/89 (26 Oct 2023 06:00) (105/57 - 134/70)  BP(mean): 99 (26 Oct 2023 06:00) (73 - 99)  RR: 19 (26 Oct 2023 06:00) (12 - 32)  SpO2: 96% (26 Oct 2023 06:00) (95% - 98%)    Parameters below as of 26 Oct 2023 06:00  Patient On (Oxygen Delivery Method): room air        PHYSICAL EXAM:  GEN: NAD, resting comfortably in bed.   CV: RRR, no m/r/g  PULM: Nonlabored breathing, no respiratory distress. Breathing comfortably on RA.   ABD: Soft, NTND abdomen. Wound manager in place draining bilious fluid. Wound continues to heal appropriately, with granulation tissue overlying wound. LUQ JOYCE in place, with grey thick output, more serousy this AM. R side ileostomy with red rubber in place, yellowish clear output. Perc cony now uncapped and bilious.   EXTREM: WWP, no edema, SCDs in place  NEURO: AOX3.             I&O's Detail    25 Oct 2023 07:01  -  26 Oct 2023 07:00  --------------------------------------------------------  IN:    Fat Emulsion (Fish Oil &amp; Plant Based) 20% Infusion: 541.7 mL    Oral Fluid: 520 mL    Sodium Chloride 0.9% Bolus: 500 mL    TPN (Total Parenteral Nutrition): 2867 mL  Total IN: 4428.7 mL    OUT:    Bulb (mL): 15 mL    Drain (mL): 375 mL    Drain (mL): 563 mL    Drain (mL): 240 mL    Voided (mL): 2585 mL  Total OUT: 3778 mL    Total NET: 650.7 mL      26 Oct 2023 07:01  -  26 Oct 2023 08:18  --------------------------------------------------------  IN:    TPN (Total Parenteral Nutrition): 147 mL  Total IN: 147 mL    OUT:  Total OUT: 0 mL    Total NET: 147 mL          LABS:                        7.5    10.93 )-----------( 172      ( 26 Oct 2023 05:30 )             24.4     10-26    136  |  103  |  36<H>  ----------------------------<  112<H>  4.3   |  26  |  0.98    Ca    8.6      26 Oct 2023 05:30  Phos  3.3     10-26  Mg     2.3     10-26    TPro  7.6  /  Alb  2.3<L>  /  TBili  5.5<H>  /  DBili  4.1<H>  /  AST  75<H>  /  ALT  72<H>  /  AlkPhos  128<H>  10-26      Urinalysis Basic - ( 26 Oct 2023 05:30 )    Color: x / Appearance: x / SG: x / pH: x  Gluc: 112 mg/dL / Ketone: x  / Bili: x / Urobili: x   Blood: x / Protein: x / Nitrite: x   Leuk Esterase: x / RBC: x / WBC x   Sq Epi: x / Non Sq Epi: x / Bacteria: x        RADIOLOGY & ADDITIONAL STUDIES:

## 2023-10-26 NOTE — PROGRESS NOTE ADULT - ASSESSMENT
78M with history of Crohn's, AFib/Flutter s/p DCCVs on amiodarone, remote ileocectomy and open appendectomy. Admitted (6/23) for SBO vs Crohns flare, s/p NGT decompression and s/p lap TRE converted to open TRE, SBR x 3, left in discontinuity with abthera vac on (6/27), RTOR for ileocolic resection, small bowel anastomosis, and abdominal wall closure on (6/28), c/b fluid collection s/p IR aspiration of perihepatic fluid on (7/3), c/b wound dehiscence s/p RTOR exlap, washout, ileocolic resection with end ileostomy, blow hole colostomy, red rubber from ileostomy to small bowel anastomosis; vicryl bridging mesh on (7/5) transferred to SICU postoperatively for hemodynamic monitoring, with hospital course complicated by periods of AMS most recently on 9/1 and associated with oliguria, severe ELVI and hyponatremia, which resolved but stepped back up for to SICU on 9/10 for acute AMS, intermittent hypoglycemia, AFib with RVR. Percutaneous Cholecystostomy placed on 9/11 for enlarged GB, PCT capped 10/5, and connected to drainage bag 10/25.    -Strict I/O, monitor output

## 2023-10-26 NOTE — PROGRESS NOTE ADULT - SUBJECTIVE AND OBJECTIVE BOX
Percutaneous cholecystostomy tube with 240 cc bilious output past 24 hr  Flushed with 5 cc normal saline

## 2023-10-26 NOTE — PROGRESS NOTE ADULT - SUBJECTIVE AND OBJECTIVE BOX
SUBJECTIVE: Patient seen and examined at bedside with chief resident. Patient states that he is feeling well with no abdominal pain, nausea, or vomiting. He endorses limiting is oral intake.      heparin   Injectable 5000 Unit(s) SubCutaneous every 8 hours  metoprolol tartrate Injectable 5 milliGRAM(s) IV Push every 6 hours      Vital Signs Last 24 Hrs  T(C): 36.6 (26 Oct 2023 05:15), Max: 36.6 (26 Oct 2023 05:15)  T(F): 97.8 (26 Oct 2023 05:15), Max: 97.8 (26 Oct 2023 05:15)  HR: 96 (26 Oct 2023 06:00) (90 - 96)  BP: 116/89 (26 Oct 2023 06:00) (105/57 - 134/70)  BP(mean): 99 (26 Oct 2023 06:00) (73 - 99)  RR: 19 (26 Oct 2023 06:00) (12 - 32)  SpO2: 96% (26 Oct 2023 06:00) (95% - 98%)    Parameters below as of 26 Oct 2023 06:00  Patient On (Oxygen Delivery Method): room air      I&O's Detail    25 Oct 2023 07:01  -  26 Oct 2023 07:00  --------------------------------------------------------  IN:    Fat Emulsion (Fish Oil &amp; Plant Based) 20% Infusion: 541.7 mL    Oral Fluid: 520 mL    Sodium Chloride 0.9% Bolus: 500 mL    TPN (Total Parenteral Nutrition): 2867 mL  Total IN: 4428.7 mL    OUT:    Bulb (mL): 15 mL    Drain (mL): 375 mL    Drain (mL): 563 mL    Drain (mL): 240 mL    Voided (mL): 2585 mL  Total OUT: 3778 mL    Total NET: 650.7 mL      26 Oct 2023 07:01  -  26 Oct 2023 07:59  --------------------------------------------------------  IN:    TPN (Total Parenteral Nutrition): 147 mL  Total IN: 147 mL    OUT:  Total OUT: 0 mL    Total NET: 147 mL          General: NAD, resting comfortably in bed  C/V: NSR  Pulm: Nonlabored breathing, no respiratory distress  Abd: soft, NT/ND, midline wound manager with bilious output  Extrem: WWP, no edema, SCDs in place  Drains: perc cony with bilious output      LABS:                        7.5    10.93 )-----------( 172      ( 26 Oct 2023 05:30 )             24.4     10-26    136  |  103  |  36<H>  ----------------------------<  112<H>  4.3   |  26  |  0.98    Ca    8.6      26 Oct 2023 05:30  Phos  3.3     10-26  Mg     2.3     10-26    TPro  7.6  /  Alb  2.3<L>  /  TBili  5.5<H>  /  DBili  4.1<H>  /  AST  75<H>  /  ALT  72<H>  /  AlkPhos  128<H>  10-26      Urinalysis Basic - ( 26 Oct 2023 05:30 )    Color: x / Appearance: x / SG: x / pH: x  Gluc: 112 mg/dL / Ketone: x  / Bili: x / Urobili: x   Blood: x / Protein: x / Nitrite: x   Leuk Esterase: x / RBC: x / WBC x   Sq Epi: x / Non Sq Epi: x / Bacteria: x        RADIOLOGY & ADDITIONAL STUDIES:

## 2023-10-26 NOTE — PROGRESS NOTE ADULT - ASSESSMENT
78 M w/ Crohn's, AFib/Flutter s/p DCCVs on amiodarone, remote ileocectomy and open appendectomy. Admitted (6/23) for SBO vs Crohns flare, s/p NGT decompression and s/p lap TRE converted to open TRE, SBR x 3, left in discontinuity with abthera vac on (6/27), RTOR for ileocolic resection, small bowel anastomosis, and abdominal wall closure on (6/28), c/b fluid collection s/p IR aspiration of perihepatic fluid on (7/3), c/b wound dehiscence s/p RTOR exlap, washout, ileocolic resection with end ileostomy, blow hole colostomy, red rubber from ileostomy to small bowel anastomosis; vicryl bridging mesh on (7/5) transferred to SICU postoperatively for hemodynamic monitoring, with hospital course complicated by periods of AMS most recently on 9/1 and associated with oliguria, severe ELVI and hyponatremia, which resolved but now stepped back up for to SICU on 9/10 for acute AMS, intermittent hypoglycemia, AFib with RVR. Perc Deb tube placed on 9/11 for enlarged GB.     Recommendations:  Continue NPO w/ TF w/ max 1 ensure/day  Strict I/Os - replete as needed  Monitor lytes  Continue max opium tincture  Continue TPN  Encourage OOB/ambulation  Continue Cholestyramine  Continue: lomotil, imodium  Continue wound care evaluation   Rest of care per SICU team    Team 5c will continue to follow

## 2023-10-27 NOTE — PROGRESS NOTE ADULT - SUBJECTIVE AND OBJECTIVE BOX
INTERVAL HPI/OVERNIGHT EVENTS:  AMRITA overnight.     STATUS POST:    6/27: Laparoscopic lysis of adhesions, converted to open lysis of adhesions, SBR x 3, temporary abdominal closure, 5L cryst, 1L 5% alb, 3 pRBC, 1 FFP, 1 plts  6/28: ex lap, removal of abthera, ileocolic resection, small bowel anastamosis, , 1.6 crystalloid, 250 5%, 175 uop   7/3: IR Gendel drained perihepatic aspiration of serous fluid.   7/5: RTOR exlap, washout, ileocolic resection with end ileostomy, blow hole colostomy, fistula, red rubber from ileostomy to small bowel anastomosis; vicryl bridging mesh; R JOYCE below ileostomy, L JOYCE at small bowel enterotomy repair; 500 LR, 500 5% albumin, 3u PRBC, 2 FFP, 400 UOP,   9/11: s/p perc cony    SUBJECTIVE:  Patient seen and examined by chief resident and team on AM rounds. No acute complaints this AM. LFTs lateral this AM.      MEDICATIONS  (STANDING):  chlorhexidine 2% Cloths 1 Application(s) Topical <User Schedule>  cholestyramine Powder (Sugar-Free) 4 Gram(s) Oral daily  diphenoxylate/atropine 2 Tablet(s) Oral every 6 hours  glucagon  Injectable 1 milliGRAM(s) IntraMuscular once  heparin   Injectable 5000 Unit(s) SubCutaneous every 8 hours  influenza  Vaccine (HIGH DOSE) 0.7 milliLiter(s) IntraMuscular once  levothyroxine Injectable 40 MICROGram(s) IV Push at bedtime  lipid, fat emulsion (Fish Oil and Plant Based) 20% Infusion 1.2 Gm/kG/Day (41.67 mL/Hr) IV Continuous <Continuous>  loperamide 4 milliGRAM(s) Oral every 6 hours  metoprolol tartrate Injectable 5 milliGRAM(s) IV Push every 6 hours  nystatin Powder 1 Application(s) Topical three times a day  opium Tincture 6 milliGRAM(s) Oral every 6 hours  Parenteral Nutrition - Adult 1 Each TPN Continuous <Continuous>    MEDICATIONS  (PRN):  LORazepam   Injectable 0.5 milliGRAM(s) IV Push at bedtime PRN Anxiety  ondansetron Injectable 4 milliGRAM(s) IV Push every 8 hours PRN Nausea and/or Vomiting      Vital Signs Last 24 Hrs  T(C): 36.4 (27 Oct 2023 11:23), Max: 36.6 (27 Oct 2023 06:39)  T(F): 97.6 (27 Oct 2023 11:23), Max: 97.9 (27 Oct 2023 06:39)  HR: 90 (27 Oct 2023 12:00) (90 - 95)  BP: 119/56 (27 Oct 2023 12:00) (105/54 - 153/67)  BP(mean): 80 (27 Oct 2023 12:00) (76 - 97)  RR: 24 (27 Oct 2023 12:00) (15 - 25)  SpO2: 100% (27 Oct 2023 12:00) (91% - 100%)    Parameters below as of 27 Oct 2023 12:00  Patient On (Oxygen Delivery Method): room air        PHYSICAL EXAM:  GEN: NAD, resting comfortably in bed.   CV: RRR, no m/r/g  PULM: Nonlabored breathing, no respiratory distress. Breathing comfortably on RA.   ABD: Soft, NTND abdomen. Wound manager in place draining bilious fluid. Wound continues to heal appropriately, with granulation tissue overlying wound. LUQ JOYCE in place. R side ileostomy with red rubber in place. Perc cony bilious.   EXTREM: WWP, no edema, SCDs in place  NEURO: AOX3.         I&O's Detail    26 Oct 2023 07:01  -  27 Oct 2023 07:00  --------------------------------------------------------  IN:    Fat Emulsion (Fish Oil &amp; Plant Based) 20% Infusion: 541.7 mL    Oral Fluid: 720 mL    TPN (Total Parenteral Nutrition): 2793 mL  Total IN: 4054.7 mL    OUT:    Bulb (mL): 11 mL    Drain (mL): 8 mL    Drain (mL): 525 mL    Drain (mL): 1020 mL    Voided (mL): 2875 mL  Total OUT: 4439 mL    Total NET: -384.3 mL      27 Oct 2023 07:01  -  27 Oct 2023 13:13  --------------------------------------------------------  IN:    Oral Fluid: 490 mL    TPN (Total Parenteral Nutrition): 882 mL  Total IN: 1372 mL    OUT:    Drain (mL): 230 mL    Voided (mL): 250 mL  Total OUT: 480 mL    Total NET: 892 mL          LABS:                        7.2    11.24 )-----------( 182      ( 27 Oct 2023 05:30 )             22.9     10-27    137  |  103  |  34<H>  ----------------------------<  125<H>  4.2   |  26  |  1.06    Ca    8.3<L>      27 Oct 2023 05:30  Phos  4.0     10-27  Mg     2.0     10-27    TPro  7.4  /  Alb  2.1<L>  /  TBili  5.5<H>  /  DBili  3.8<H>  /  AST  85<H>  /  ALT  73<H>  /  AlkPhos  132<H>  10-27      Urinalysis Basic - ( 27 Oct 2023 05:30 )    Color: x / Appearance: x / SG: x / pH: x  Gluc: 125 mg/dL / Ketone: x  / Bili: x / Urobili: x   Blood: x / Protein: x / Nitrite: x   Leuk Esterase: x / RBC: x / WBC x   Sq Epi: x / Non Sq Epi: x / Bacteria: x        RADIOLOGY & ADDITIONAL STUDIES:

## 2023-10-27 NOTE — PROGRESS NOTE ADULT - SUBJECTIVE AND OBJECTIVE BOX
lytes nomal  output 1l from fistula  500cc from perc cony  AVSS  wound myles    OOB  TPN  Woundcare

## 2023-10-27 NOTE — PROGRESS NOTE ADULT - SUBJECTIVE AND OBJECTIVE BOX
SUBJECTIVE: Patient seen and examined at bedside with chief resident. Patient states that he is feeling well and that his pain is well controlled, he denies nausea and vomiting. His wound manager output has been much decreased.      heparin   Injectable 5000 Unit(s) SubCutaneous every 8 hours  metoprolol tartrate Injectable 5 milliGRAM(s) IV Push every 6 hours      Vital Signs Last 24 Hrs  T(C): 36.4 (27 Oct 2023 11:23), Max: 36.6 (27 Oct 2023 06:39)  T(F): 97.6 (27 Oct 2023 11:23), Max: 97.9 (27 Oct 2023 06:39)  HR: 90 (27 Oct 2023 12:00) (90 - 95)  BP: 119/56 (27 Oct 2023 12:00) (105/54 - 153/67)  BP(mean): 80 (27 Oct 2023 12:00) (76 - 97)  RR: 24 (27 Oct 2023 12:00) (15 - 25)  SpO2: 100% (27 Oct 2023 12:00) (91% - 100%)    Parameters below as of 27 Oct 2023 12:00  Patient On (Oxygen Delivery Method): room air      I&O's Detail    26 Oct 2023 07:01  -  27 Oct 2023 07:00  --------------------------------------------------------  IN:    Fat Emulsion (Fish Oil &amp; Plant Based) 20% Infusion: 541.7 mL    Oral Fluid: 720 mL    TPN (Total Parenteral Nutrition): 2793 mL  Total IN: 4054.7 mL    OUT:    Bulb (mL): 11 mL    Drain (mL): 8 mL    Drain (mL): 525 mL    Drain (mL): 1020 mL    Voided (mL): 2875 mL  Total OUT: 4439 mL    Total NET: -384.3 mL      27 Oct 2023 07:01  -  27 Oct 2023 15:47  --------------------------------------------------------  IN:    Oral Fluid: 740 mL    TPN (Total Parenteral Nutrition): 1029 mL  Total IN: 1769 mL    OUT:    Drain (mL): 230 mL    Voided (mL): 800 mL  Total OUT: 1030 mL    Total NET: 739 mL          General: NAD, resting comfortably in bed  C/V: NSR  Pulm: Nonlabored breathing, no respiratory distress  Abd: soft, NT/ND, midline wound manager with bilious output, perc cony with bilious output.  Extrem: WWP, no edema, SCDs in place        LABS:                        7.2    11.24 )-----------( 182      ( 27 Oct 2023 05:30 )             22.9     10-27    137  |  103  |  34<H>  ----------------------------<  125<H>  4.2   |  26  |  1.06    Ca    8.3<L>      27 Oct 2023 05:30  Phos  4.0     10-27  Mg     2.0     10-27    TPro  7.4  /  Alb  2.1<L>  /  TBili  5.5<H>  /  DBili  3.8<H>  /  AST  85<H>  /  ALT  73<H>  /  AlkPhos  132<H>  10-27      Urinalysis Basic - ( 27 Oct 2023 05:30 )    Color: x / Appearance: x / SG: x / pH: x  Gluc: 125 mg/dL / Ketone: x  / Bili: x / Urobili: x   Blood: x / Protein: x / Nitrite: x   Leuk Esterase: x / RBC: x / WBC x   Sq Epi: x / Non Sq Epi: x / Bacteria: x        RADIOLOGY & ADDITIONAL STUDIES:

## 2023-10-27 NOTE — PROGRESS NOTE ADULT - SUBJECTIVE AND OBJECTIVE BOX
Patient seen and examined at bedside. Patient states no abdominal pain. Patient ambulating, tolerating ice chips and EnsureMax x1/day, urinating adequately.   Patient denies fever, chills, chest pain, shortness of breath, nausea, vomiting, lightheadedness, dizziness.      T(F): 97.9 (10-27-23 @ 06:39), Max: 97.9 (10-27-23 @ 06:39)  HR: 95 (10-27-23 @ 07:00) (92 - 95)  BP: 141/64 (10-27-23 @ 07:00) (105/54 - 153/67)  RR: 21 (10-27-23 @ 07:00) (15 - 25)  SpO2: 93% (10-27-23 @ 07:00) (91% - 98%)  Wt(kg): --  I&O's Summary    26 Oct 2023 07:01  -  27 Oct 2023 07:00  --------------------------------------------------------  IN: 3907.7 mL / OUT: 4439 mL / NET: -531.3 mL        MEDICATIONS  (STANDING):  chlorhexidine 2% Cloths 1 Application(s) Topical <User Schedule>  cholestyramine Powder (Sugar-Free) 4 Gram(s) Oral daily  diphenoxylate/atropine 2 Tablet(s) Oral every 6 hours  glucagon  Injectable 1 milliGRAM(s) IntraMuscular once  heparin   Injectable 5000 Unit(s) SubCutaneous every 8 hours  influenza  Vaccine (HIGH DOSE) 0.7 milliLiter(s) IntraMuscular once  levothyroxine Injectable 40 MICROGram(s) IV Push at bedtime  lipid, fat emulsion (Fish Oil and Plant Based) 20% Infusion 1.2 Gm/kG/Day (41.67 mL/Hr) IV Continuous <Continuous>  loperamide 4 milliGRAM(s) Oral every 6 hours  metoprolol tartrate Injectable 5 milliGRAM(s) IV Push every 6 hours  nystatin Powder 1 Application(s) Topical three times a day  opium Tincture 6 milliGRAM(s) Oral every 6 hours  Parenteral Nutrition - Adult 1 Each TPN Continuous <Continuous>    MEDICATIONS  (PRN):  LORazepam   Injectable 0.5 milliGRAM(s) IV Push at bedtime PRN Anxiety  ondansetron Injectable 4 milliGRAM(s) IV Push every 8 hours PRN Nausea and/or Vomiting      Physical Exam:  General: NAD  Neuro: A/O x3, CNs II-XII grossly intact, normal motor/sensation, no focal deficits  HEENT: NC/AT, EOMI, PERRLA, normal hearing, no oral lesions, neck supple w/o LAD  Pulmonary: Nonlabored breathing, no respiratory distress, CTA-B  Cardiovascular: NSR, no murmurs  Abdominal: soft, NT/ND, midline incision wound manager (Dustin pouch) in place draining green fluid. Right ileostomy with minimal brown liquid out put . Capped perc cony. Left JOYCE with minimal brown output.   Extremities: WWP, 5/5 strength x 4, no clubbing/cyanosis/edema  Pulses: palpable distal pulses  Skin: mild jaundice; no rashes noted  MSK: No joint swelling  Psych: No signs of anxiety or depression    LABS:                        7.2    11.24 )-----------( 182      ( 27 Oct 2023 05:30 )             22.9     10-27    137  |  103  |  34<H>  ----------------------------<  125<H>  4.2   |  26  |  1.06    Ca    8.3<L>      27 Oct 2023 05:30  Phos  4.0     10-27  Mg     2.0     10-27    TPro  7.4  /  Alb  2.1<L>  /  TBili  5.5<H>  /  DBili  3.8<H>  /  AST  85<H>  /  ALT  73<H>  /  AlkPhos  132<H>  10-27      Urinalysis Basic - ( 27 Oct 2023 05:30 )    Color: x / Appearance: x / SG: x / pH: x  Gluc: 125 mg/dL / Ketone: x  / Bili: x / Urobili: x   Blood: x / Protein: x / Nitrite: x   Leuk Esterase: x / RBC: x / WBC x   Sq Epi: x / Non Sq Epi: x / Bacteria: x        RADIOLOGY & ADDITIONAL TESTS:

## 2023-10-27 NOTE — PROGRESS NOTE ADULT - SUBJECTIVE AND OBJECTIVE BOX
Percutaneous cholecystostomy tube with 525 cc bilious output past 24 hr, previously 240 cc  Flushed with 5 cc normal saline

## 2023-10-27 NOTE — PROGRESS NOTE ADULT - ASSESSMENT
78 M w/ Crohn's, AFib/Flutter s/p DCCVs on amiodarone, remote ileocectomy and open appendectomy. Admitted (6/23) for SBO vs Crohns flare, s/p NGT decompression and s/p lap TRE converted to open TRE, SBR x 3, left in discontinuity with abthera vac on (6/27), RTOR for ileocolic resection, small bowel anastomosis, and abdominal wall closure on (6/28), c/b fluid collection s/p IR aspiration of perihepatic fluid on (7/3), c/b wound dehiscence s/p RTOR exlap, washout, ileocolic resection with end ileostomy, blow hole colostomy, red rubber from ileostomy to small bowel anastomosis; vicryl bridging mesh on (7/5) transferred to SICU postoperatively for hemodynamic monitoring, with hospital course complicated by periods of AMS most recently on 9/1 and associated with oliguria, severe ELVI and hyponatremia, which resolved but stepped back up for to SICU on 9/10 for acute AMS, intermittent hypoglycemia, AFib with RVR. Percutaneous Cholecystostomy placed on 9/11 for enlarged GB, PCT capped 10/5.    Plan:  - Trend LFTs, keep perc cony uncapped  - C/w TPN  - Continue to monitor strict I&Os, bolus as needed for repletion  - Gattex authorization, however, do not start Gattex without multidisciplinary discussion  - Wound manager changes as per wound care nursing staff (next today, Friday 10/27)  Team 4c will continue to follow, please call with any questions or concerns

## 2023-10-27 NOTE — PROGRESS NOTE ADULT - ASSESSMENT
78 M w/ Crohn's, AFib/Flutter s/p DCCVs on amiodarone, remote ileocectomy and open appendectomy. Admitted (6/23) for SBO vs Crohns flare, s/p NGT decompression and s/p lap TRE converted to open TRE, SBR x 3, left in discontinuity with abthera vac on (6/27), RTOR for ileocolic resection, small bowel anastomosis, and abdominal wall closure on (6/28), c/b fluid collection s/p IR aspiration of perihepatic fluid on (7/3), c/b wound dehiscence s/p RTOR exlap, washout, ileocolic resection with end ileostomy, blow hole colostomy, red rubber from ileostomy to small bowel anastomosis; vicryl bridging mesh on (7/5) transferred to SICU postoperatively for hemodynamic monitoring, with hospital course complicated by periods of AMS most recently on 9/1 and associated with oliguria, severe ELVI and hyponatremia, which resolved but stepped back up for to SICU on 9/10 for acute AMS, intermittent hypoglycemia, AFib with RVR. Percutaneous Cholecystostomy placed on 9/11 for enlarged GB, PCT capped 10/5.    NEURO: AMS (resolved), EEG neg. Hx of depression - Effexor Dc'd on 9/10 due to delirium. Cont Melatonin prn Insomnia, Cont Zofran. Lorazepam prn.  HENT: Cepacol  CV: Hx Afib/flutter: s/p DCCV, Off amiodarone given transaminitis; Continue Metoprolol 5q6. Hx HLD: Lipitor Held given high LFTs. TTE  (7/18) - PASP 64mmHg, EF 65-70%,  Hx HTN -  holding Norvasc 10qd. Holding Losartan  Cont Lopressor 5 q6.   PULM: no resp distress on room air.   GI/FEN: Cont Ensure max x1/day. Cont TPN (2.5liters/day volume) for high ostomy and ECF output: Cont Octreotide in TPN, Imodium, Lomotil, Tincture of Opium. Transaminitis of unknown origin, now stable. s/p Perc Choley on 9/11 - tube capped 10/5. PPI. Significant drainge from fistula,  bolus as needed to keep I&O even. Cholestyramine started 10/18. GI following, considering Gattex    : Voids  ENDO: No need to check ISS. Hx hypothyroid: IV Synthroid, TSH wnl on 10/23  ID: Lekocytosis 10/26, afebrile. Numerous SICU admissions for sepsis. Currently off abx. Cont Nystatin powder // PREVIOUSLY had C. tertium, Lactobacillis from his IR cx 7/3, and candida albicans, lactobacillus, vanc sensitive E faecium, vanc resistant E gallinarum, vanc resistant E casseliflavus, lactobacillus from his OR cx 7/5. Completed course of abx with Imipenem (9/10-9/15). Imipenem (8/26--) imipenem (6/30-7/12, 7/23-7/30), Dapto (6/30-7/5 and 7/23-7/24). Empiric dapto (8/23-24) and cefepime (8/23-24).   PPX: SCDs, SQH  LINES: PIVs, LUE PICC (9/1- )  WOUNDS/DRAINS: Abdominal wound and fistula with wound manager in place dressing change T & F. RLQ Ostomy. JOYCE. IR PCT.   PT: Ambulate as tolerated   DISPO: SICU due to complicated hospital course. but will do vitals q4hrs (hold overnight), and CBC MWF, BMP QD in setting of high fistula output      Primary To-Do List:  [ ] TPN/lipids daily (Octreotide, Lipids @100g and protein) 2.5L volume per day. Trig weekly. EnsureMax 1/day  [ ] Labs M/W/F / BMP QD  [ ] Wound manager over wound changed Tu/F  [ ] F/u GI Dr. Yuan, requesting Gattex (Teduglutide), waiting for auth  [ ] F/u VitB12, Folate levels for macrocytic anemia

## 2023-10-27 NOTE — ADVANCED PRACTICE NURSE CONSULT - ASSESSMENT
Stomatized fistula at 2 o'clock with thin brown effluent. Periwound skin is intact without irritation. RLQ ileostomy stoma retracted, small less than 1 inch ovoid, peristomal skin intact. Stoma intubated with red rubber catheter.  Stomatized fistula at 2 o'clock with thin brown effluent. Periwound skin is intact without irritation. RLQ ileostomy stoma retracted, small less than 1 inch ovoid, peristomal skin intact. Stoma intubated with red rubber catheter.  Skin thoroughly cleansed.  Vashe soak applied to wound bed.  Cavilon No Sting Barrier film applied to periwound, Adapt ostomy rings, stoma paste applied around wound and within creases.  Entire area pouched with an [employer] wound manager # [phone].   2 piece 1 3/4" pouch with Adapt ring applied to RLQ Ileostomy.

## 2023-10-28 NOTE — PROGRESS NOTE ADULT - ASSESSMENT
78 M w/ Crohn's, AFib/Flutter s/p DCCVs on amiodarone, remote ileocectomy and open appendectomy. Admitted (6/23) for SBO vs Crohns flare, s/p NGT decompression and s/p lap TRE converted to open TRE, SBR x 3, left in discontinuity with abthera vac on (6/27), RTOR for ileocolic resection, small bowel anastomosis, and abdominal wall closure on (6/28), c/b fluid collection s/p IR aspiration of perihepatic fluid on (7/3), c/b wound dehiscence s/p RTOR exlap, washout, ileocolic resection with end ileostomy, blow hole colostomy, red rubber from ileostomy to small bowel anastomosis; vicryl bridging mesh on (7/5) transferred to SICU postoperatively for hemodynamic monitoring, with hospital course complicated by periods of AMS most recently on 9/1 and associated with oliguria, severe ELVI and hyponatremia, which resolved but stepped back up for to SICU on 9/10 for acute AMS, intermittent hypoglycemia, AFib with RVR. Percutaneous Cholecystostomy placed on 9/11 for enlarged GB, PCT capped 10/5 and uncapped 10/25 for hyperbilirubinemia.    NEURO: AMS (resolved), EEG neg. Hx of depression - Effexor Dc'd on 9/10 due to delirium. Cont Melatonin prn Insomnia, Cont Zofran. Lorazepam prn.  HENT: Cepacol  CV: Hx Afib/flutter: s/p DCCV, Off amiodarone given transaminitis; Continue Metoprolol 5q6. Hx HLD: Lipitor Held given high LFTs. TTE  (7/18) - PASP 64mmHg, EF 65-70%,  Hx HTN -  holding Norvasc 10qd. Holding Losartan  Cont Lopressor 5 q6.   PULM: no resp distress on room air.   GI/FEN: Cont Ensure max x1/day. Cont TPN (2.5liters/day volume) for high ostomy and ECF output: Cont Octreotide in TPN, Imodium, Lomotil, Tincture of Opium. Transaminitis of unknown origin, now stable. s/p Perc Choley on 9/11 - tube uncapped 10/25 for worsening hyperbilirubinemia. Plan for MRCP. PPI. Significant drainge from fistula,  bolus as needed to keep I&O even. Cholestyramine started 10/18. GI following, considering Gattex  : Voids  ENDO: No need to check ISS. Hx hypothyroid: IV Synthroid, TSH wnl on 10/23  ID: Lekocytosis 10/26, afebrile. Numerous SICU admissions for sepsis. Currently off abx. Cont Nystatin powder // PREVIOUSLY had C. tertium, Lactobacillis from his IR cx 7/3, and candida albicans, lactobacillus, vanc sensitive E faecium, vanc resistant E gallinarum, vanc resistant E casseliflavus, lactobacillus from his OR cx 7/5. Completed course of abx with Imipenem (9/10-9/15). Imipenem (8/26--) imipenem (6/30-7/12, 7/23-7/30), Dapto (6/30-7/5 and 7/23-7/24). Empiric dapto (8/23-24) and cefepime (8/23-24).   PPX: SCDs, SQH  LINES: PIVs, LUE PICC (9/1- )  WOUNDS/DRAINS: Abdominal wound and fistula with wound manager in place dressing change T & F. RLQ Ostomy. JOYCE. IR PCT.   PT: Ambulate as tolerated   DISPO: SICU due to complicated hospital course. but will do vitals q4hrs (hold overnight), and CBC MWF, BMP QD in setting of high fistula output

## 2023-10-28 NOTE — PROGRESS NOTE ADULT - NS ATTEND AMEND GEN_ALL_CORE FT
77 yo M w/ Chrons, prolonged and complex hospital course as above s/p multiple bowel resections and on TPN due to bowel being left in discontinuity and hospital course further complicated by AMS, hypoglycemia, AF w/ RVR, and acalculous cholecystitis s/p cholecystostomy. Increasing bili despite uncapped tube, plan for MRCP. TPN adjusted. Diarrhea better controlled on imodium, lomotil, tincture of opium. Rest as above.

## 2023-10-28 NOTE — PROGRESS NOTE ADULT - SUBJECTIVE AND OBJECTIVE BOX
Interval Events: No overnight events     Patient seen and examined at bedside. ROS negative.       Allergies  penicillin (Angioedema)          Vital Signs Last 24 Hrs  T(C): 36.6 (28 Oct 2023 09:00), Max: 37.1 (28 Oct 2023 04:00)  T(F): 97.8 (28 Oct 2023 09:00), Max: 98.7 (28 Oct 2023 04:00)  HR: 87 (28 Oct 2023 09:00) (87 - 95)  BP: 118/59 (28 Oct 2023 07:37) (96/54 - 133/63)  BP(mean): 82 (28 Oct 2023 07:37) (69 - 91)  RR: 16 (28 Oct 2023 09:00) (12 - 32)  SpO2: 96% (28 Oct 2023 09:00) (96% - 100%)    Parameters below as of 27 Oct 2023 20:00  Patient On (Oxygen Delivery Method): room air        10-27 @ 07:01  -  10-28 @ 07:00  --------------------------------------------------------  IN: 4656 mL / OUT: 5365 mL / NET: -709 mL    10-28 @ 07:01  -  10-28 @ 11:56  --------------------------------------------------------  IN: 441 mL / OUT: 250 mL / NET: 191 mL      10-27 @ 07:01  -  10-28 @ 07:00  --------------------------------------------------------  IN: 4656 mL / OUT: 5365 mL / NET: -709 mL    10-28 @ 07:01  -  10-28 @ 11:56  --------------------------------------------------------  IN: 441 mL / OUT: 250 mL / NET: 191 mL        Physical Exam:     Gen: No acute distress   Neuro: Alert and oriented; no neuro defecits  HENT: Scleral icterus   CV: Regular rate and rhythm, no murmurs or rubs, no peripheral edema   Pulm: Lung sounds clear bilaterally   Abd: Soft NT ND + BS  Ext: Warm, well-perfused   Vasc: + DP b/l   Skin: Jaundice; no rashes noted  MSK: No joint swelling  Psych: No signs of anxiety or depression      LABS:      CBC Full  -  ( 28 Oct 2023 07:35 )  WBC Count : 10.14 K/uL  RBC Count : 2.44 M/uL  Hemoglobin : 7.8 g/dL  Hematocrit : 24.9 %  Platelet Count - Automated : 185 K/uL  Mean Cell Volume : 102.0 fl  Mean Cell Hemoglobin : 32.0 pg  Mean Cell Hemoglobin Concentration : 31.3 gm/dL  Auto Neutrophil # : 7.52 K/uL  Auto Lymphocyte # : 1.13 K/uL  Auto Monocyte # : 0.97 K/uL  Auto Eosinophil # : 0.30 K/uL  Auto Basophil # : 0.07 K/uL  Auto Neutrophil % : 74.1 %  Auto Lymphocyte % : 11.1 %  Auto Monocyte % : 9.6 %  Auto Eosinophil % : 3.0 %  Auto Basophil % : 0.7 %    10-28    136  |  101  |  36<H>  ----------------------------<  130<H>  4.4   |  27  |  1.16    Ca    8.9      28 Oct 2023 07:35  Phos  3.9     10-28  Mg     2.2     10-28    TPro  8.2  /  Alb  2.5<L>  /  TBili  5.5<H>  /  DBili  3.8<H>  /  AST  95<H>  /  ALT  82<H>  /  AlkPhos  147<H>  10-28

## 2023-10-28 NOTE — PROGRESS NOTE ADULT - SUBJECTIVE AND OBJECTIVE BOX
[employer] Attending Coverage for Dr Peterson  Seen on morning rounds, discussed with surgical housestaff    No complaints at present  Denies abdominal pain  Ate an egg last night   Remains on TPN    ICU Vital Signs Last 24 Hrs  T(C): 36.6 (28 Oct 2023 09:00), Max: 37.1 (28 Oct 2023 04:00)  T(F): 97.8 (28 Oct 2023 09:00), Max: 98.7 (28 Oct 2023 04:00)  HR: 87 (28 Oct 2023 09:00) (87 - 95)  BP: 118/59 (28 Oct 2023 07:37) (96/54 - 139/57)  BP(mean): 82 (28 Oct 2023 07:37) (69 - 91)  ABP: --  ABP(mean): --  RR: 16 (28 Oct 2023 09:00) (12 - 32)  SpO2: 96% (28 Oct 2023 09:00) (96% - 100%)    O2 Parameters below as of 27 Oct 2023 20:00  Patient On (Oxygen Delivery Method): room air        I&O's Detail    27 Oct 2023 07:01  -  28 Oct 2023 07:00  --------------------------------------------------------  IN:    Fat Emulsion (Fish Oil &amp; Plant Based) 20% Infusion: 576 mL    Oral Fluid: 1140 mL    TPN (Total Parenteral Nutrition): 2940 mL  Total IN: 4656 mL    OUT:    Bulb (mL): 25 mL    Drain (mL): 910 mL    Drain (mL): 850 mL    Drain (mL): 5 mL    Voided (mL): 3575 mL  Total OUT: 5365 mL    Total NET: -709 mL      28 Oct 2023 07:01  -  28 Oct 2023 10:50  --------------------------------------------------------  IN:    TPN (Total Parenteral Nutrition): 441 mL  Total IN: 441 mL    OUT:    Voided (mL): 250 mL  Total OUT: 250 mL    Total NET: 191 mL          Gen NAD  Abdomen soft, nontender,  midline wound manager with bilious output, wound clean based, fistula pink and budded, perc cony with bilious output.                          7.8    10.14 )-----------( 185      ( 28 Oct 2023 07:35 )             24.9   10-28    136  |  101  |  36<H>  ----------------------------<  130<H>  4.4   |  27  |  1.16    Ca    8.9      28 Oct 2023 07:35  Phos  3.9     10-28  Mg     2.2     10-28    TPro  8.2  /  Alb  2.5<L>  /  TBili  5.5<H>  /  DBili  3.8<H>  /  AST  95<H>  /  ALT  82<H>  /  AlkPhos  147<H>  10-28

## 2023-10-28 NOTE — PROGRESS NOTE ADULT - SUBJECTIVE AND OBJECTIVE BOX
Percutaneous cholecystostomy tube with 850 cc bilious output past 24 hr, previously 525 cc  Flushed easily with 5 cc normal saline

## 2023-10-28 NOTE — PROGRESS NOTE ADULT - ASSESSMENT
ICU care appreciated  Continue TPN  Strict I&O  Continue antimotility agents  Local wound care  Per cony to drainage/uncapped, trend LFTs, pending MRCP No

## 2023-10-29 NOTE — PROGRESS NOTE ADULT - SUBJECTIVE AND OBJECTIVE BOX
Interval Events: No acute events overnight    Patient seen and examined at bedside. Endorses feeling thirsty. ROS otherwise negative.       Allergies    penicillin (Angioedema)    Intolerances        Vital Signs Last 24 Hrs  T(C): 36.6 (29 Oct 2023 08:58), Max: 37.1 (29 Oct 2023 05:42)  T(F): 97.8 (29 Oct 2023 08:58), Max: 98.7 (29 Oct 2023 05:42)  HR: 91 (29 Oct 2023 08:58) (89 - 95)  BP: 122/56 (29 Oct 2023 08:00) (107/59 - 126/59)  BP(mean): 78 (29 Oct 2023 08:00) (76 - 85)  RR: 16 (29 Oct 2023 08:58) (15 - 22)  SpO2: 97% (29 Oct 2023 08:58) (94% - 99%)    Parameters below as of 29 Oct 2023 08:00  Patient On (Oxygen Delivery Method): room air        10-28 @ 07:01  -  10-29 @ 07:00  --------------------------------------------------------  IN: 3745.2 mL / OUT: 3270 mL / NET: 475.2 mL    10-29 @ 07:01  -  10-29 @ 09:12  --------------------------------------------------------  IN: 294 mL / OUT: 400 mL / NET: -106 mL      10-28 @ 07:01  -  10-29 @ 07:00  --------------------------------------------------------  IN: 3745.2 mL / OUT: 3270 mL / NET: 475.2 mL    10-29 @ 07:01  -  10-29 @ 09:12  --------------------------------------------------------  IN: 294 mL / OUT: 400 mL / NET: -106 mL        Physical Exam:     Gen: No acute events   Neuro: A&OX3 No deficits  HENT: Dry mucus membranes, scleral icterus   CV: Regular rate and rhythm, no murmurs or gallops, no peripheral edema   Pulm: Lung sounds clear bilaterally   Abd: Soft NT ND + BS  Ext: Warm, well-perfused   Vasc: +2 radial/pedal pulses   Skin: Jaundice; no rashes noted  MSK: No joint swelling  Psych: No signs of anxiety or depression      LABS:      CBC Full  -  ( 29 Oct 2023 06:03 )  WBC Count : 9.64 K/uL  RBC Count : 2.55 M/uL  Hemoglobin : 8.0 g/dL  Hematocrit : 25.5 %  Platelet Count - Automated : 195 K/uL  Mean Cell Volume : 100.0 fl  Mean Cell Hemoglobin : 31.4 pg  Mean Cell Hemoglobin Concentration : 31.4 gm/dL    10-29    135  |  101  |  34<H>  ----------------------------<  123<H>  4.5   |  26  |  1.22    Ca    8.8      29 Oct 2023 06:03  Phos  3.9     10-29  Mg     2.1     10-29    TPro  8.0  /  Alb  2.5<L>  /  TBili  5.7<H>  /  DBili  4.0<H>  /  AST  105<H>  /  ALT  86<H>  /  AlkPhos  152<H>  10-29      Urine Lytes pending

## 2023-10-29 NOTE — PROGRESS NOTE ADULT - SUBJECTIVE AND OBJECTIVE BOX
SUBJECTIVE: Patient seen and examined at bedside with chief resident. Patient was resting comfortably in bed.       MEDICATIONS  (STANDING):  chlorhexidine 2% Cloths 1 Application(s) Topical <User Schedule>  cholestyramine Powder (Sugar-Free) 4 Gram(s) Oral daily  diphenoxylate/atropine 2 Tablet(s) Oral every 6 hours  glucagon  Injectable 1 milliGRAM(s) IntraMuscular once  heparin   Injectable 5000 Unit(s) SubCutaneous every 8 hours  influenza  Vaccine (HIGH DOSE) 0.7 milliLiter(s) IntraMuscular once  levothyroxine Injectable 40 MICROGram(s) IV Push at bedtime  lipid, fat emulsion (Fish Oil and Plant Based) 20% Infusion 1.2 Gm/kG/Day (41.67 mL/Hr) IV Continuous <Continuous>  lipid, fat emulsion (Fish Oil and Plant Based) 20% Infusion 1.2 Gm/kG/Day (41.67 mL/Hr) IV Continuous <Continuous>  loperamide 4 milliGRAM(s) Oral every 6 hours  metoprolol tartrate Injectable 5 milliGRAM(s) IV Push every 6 hours  nystatin Powder 1 Application(s) Topical three times a day  opium Tincture 6 milliGRAM(s) Oral every 6 hours  Parenteral Nutrition - Adult 1 Each TPN Continuous <Continuous>  Parenteral Nutrition - Adult 1 Each TPN Continuous <Continuous>    MEDICATIONS  (PRN):  LORazepam   Injectable 0.5 milliGRAM(s) IV Push at bedtime PRN Anxiety  ondansetron Injectable 4 milliGRAM(s) IV Push every 8 hours PRN Nausea and/or Vomiting      Vital Signs Last 24 Hrs  T(C): 37.1 (29 Oct 2023 05:42), Max: 37.1 (29 Oct 2023 05:42)  T(F): 98.7 (29 Oct 2023 05:42), Max: 98.7 (29 Oct 2023 05:42)  HR: 93 (29 Oct 2023 05:00) (87 - 95)  BP: 118/56 (29 Oct 2023 05:00) (107/59 - 126/59)  BP(mean): 80 (29 Oct 2023 05:00) (76 - 85)  RR: 22 (29 Oct 2023 05:00) (16 - 22)  SpO2: 95% (29 Oct 2023 05:00) (95% - 99%)    Parameters below as of 29 Oct 2023 05:00  Patient On (Oxygen Delivery Method): room air        PHYSICAL EXAM:  General: NAD, resting comfortably in bed  C/V: NSR  Pulm: Nonlabored breathing, no respiratory distress  Abd: soft, NT/ND, midline wound manager with bilious output, perc cony with bilious output.  Extrem: WWP, no edema, SCDs in place                    I&O's Detail    28 Oct 2023 07:01  -  29 Oct 2023 07:00  --------------------------------------------------------  IN:    Fat Emulsion (Fish Oil &amp; Plant Based) 20% Infusion: 499.2 mL    Lactated Ringers Bolus: 500 mL    Oral Fluid: 100 mL    TPN (Total Parenteral Nutrition): 2646 mL  Total IN: 3745.2 mL    OUT:    Bulb (mL): 5 mL    Drain (mL): 660 mL    Drain (mL): 550 mL    Drain (mL): 50 mL    Voided (mL): 2005 mL  Total OUT: 3270 mL    Total NET: 475.2 mL          LABS:                        8.0    9.64  )-----------( 195      ( 29 Oct 2023 06:03 )             25.5     10-29    135  |  101  |  34<H>  ----------------------------<  123<H>  4.5   |  26  |  1.22    Ca    8.8      29 Oct 2023 06:03  Phos  3.9     10-29  Mg     2.1     10-29    TPro  8.0  /  Alb  2.5<L>  /  TBili  5.7<H>  /  DBili  4.0<H>  /  AST  105<H>  /  ALT  86<H>  /  AlkPhos  152<H>  10-29      Urinalysis Basic - ( 29 Oct 2023 06:03 )    Color: x / Appearance: x / SG: x / pH: x  Gluc: 123 mg/dL / Ketone: x  / Bili: x / Urobili: x   Blood: x / Protein: x / Nitrite: x   Leuk Esterase: x / RBC: x / WBC x   Sq Epi: x / Non Sq Epi: x / Bacteria: x        RADIOLOGY & ADDITIONAL STUDIES:

## 2023-10-29 NOTE — PROGRESS NOTE ADULT - ATTENDING COMMENTS
[employer] Attending Coverage for Dr Peterson  Seen on morning rounds, discussed with surgical and ICU housestaff  No complaints at present  Denies abdominal pain  Remains on TPN  Received a bolus overnight  Afebrile  Gen NAD  Abdomen soft, nontender,  midline wound manager with bilious output, wound clean based, fistula pink and budded, perc cony with bilious output.  ICU care appreciated  Continue TPN  Strict I&O  Continue antimotility agents  Local wound care  MRCP ordered

## 2023-10-29 NOTE — CHART NOTE - NSCHARTNOTEFT_GEN_A_CORE
Admitting Diagnosis:   Patient is a 78y old  Male who presents with a chief complaint of SBO (27 Oct 2023 08:07)      PAST MEDICAL & SURGICAL HISTORY:  Essential hypertension  Hypertension      Atrial fibrillation  s/p cardioversion 2010 and 2014  Pt. reports 4 DCCV  Now on Amiodarone 200mg PO bid and Eliquis 5mg PO bid  Last DCCV 4 yrs ago at [facility]      Crohn's disease  s/p partial resection of ileum      Hyperlipidemia      Hypothyroidism      History of depression  On Venlafaxine ER 150mg PO bid      Junctional rhythm      H/O knee surgery      History of cataract surgery          Current Nutrition Order:   TPN- 2.5L bag running over 18hrs (74ml/hr), providing 411g Dex, 144g AA, 100g lipids; provides 2973.4 kcals, 144g protein, GIR 3.35 mg/kg/min  PO- 1 Ensure Max daily (150 kcals, 30g protein), 2 LPS (200kcal, 30g pro), lollipops, egg    PO Intake: Good (%) [ X ]  Fair (50-75%) [   ] Poor (<25%) [   ]    GI Issues:  LUQ drain 5cc x 24hrs, ileostomy 50cc x 24hrs, abdominal wound/fistula output 660cc x 24hrs, per cony 550cc x 24hrs  No N/V    Pain: no pain/discomfort reported    Skin Integrity: abd wound [10.6 x 6.5cm] and fistula with wound manager in place, RLQ ileostomy, L JOYCE drain, perc cony drain, R heel DTI, skin tear below ostomy.   Rudy score 18; slightly jaundiced    Labs:   10-29    135  |  101  |  34<H>  ----------------------------<  123<H>  4.5   |  26  |  1.22    Ca    8.8      29 Oct 2023 06:03  Phos  3.9     10-29  Mg     2.1     10-29    TPro  8.0  /  Alb  2.5<L>  /  TBili  5.7<H>  /  DBili  4.0<H>  /  AST  105<H>  /  ALT  86<H>  /  AlkPhos  152<H>  10-29    CAPILLARY BLOOD GLUCOSE          Medications:  MEDICATIONS  (STANDING):  chlorhexidine 2% Cloths 1 Application(s) Topical <User Schedule>  cholestyramine Powder (Sugar-Free) 4 Gram(s) Oral daily  diphenoxylate/atropine 2 Tablet(s) Oral every 6 hours  glucagon  Injectable 1 milliGRAM(s) IntraMuscular once  heparin   Injectable 5000 Unit(s) SubCutaneous every 8 hours  influenza  Vaccine (HIGH DOSE) 0.7 milliLiter(s) IntraMuscular once  levothyroxine Injectable 40 MICROGram(s) IV Push at bedtime  lipid, fat emulsion (Fish Oil and Plant Based) 20% Infusion 1.2 Gm/kG/Day (41.67 mL/Hr) IV Continuous <Continuous>  loperamide 4 milliGRAM(s) Oral every 6 hours  metoprolol tartrate Injectable 5 milliGRAM(s) IV Push every 6 hours  nystatin Powder 1 Application(s) Topical three times a day  opium Tincture 6 milliGRAM(s) Oral every 6 hours  Parenteral Nutrition - Adult 1 Each TPN Continuous <Continuous>  Parenteral Nutrition - Adult 1 Each TPN Continuous <Continuous>    MEDICATIONS  (PRN):  LORazepam   Injectable 0.5 milliGRAM(s) IV Push at bedtime PRN Anxiety  ondansetron Injectable 4 milliGRAM(s) IV Push every 8 hours PRN Nausea and/or Vomiting      Weight:  Daily 85.2kg [24 Oct 2023]  Daily 85.2kg [20 Oct 2023]  Daily 81.9kg [18 Oct 2023]  Daily 85.2kg [16 Oct 2023]  Daily   95.2kg [12 Oct 2023]   Daily 87.7kg [11 Oct 2023]  Daily 87.4kg [09 Oct 2023]  Daily 86.4kg [07 Oct 2023]  Daily 82.5kg [06 Oct 2023]  Daily 82.6kg [4 Oct 2023]   Daily 83.5kg [03 Oct 2023]  Daily 84.5kg [2 Oct 2023]  Daily 87.2kg [1 Oct 2023]  Daily 88.3kg [29 Sep 2023]  Daily 89.9kg [28 Sep 2023]  Daily 87.7kg [24 Sep 2023]  Daily 90.4kg [21 Sep 2023]  Daily 91.4kg [20 Sep 2023]  Daily 86.7kg [18 Sep 2023]  Daily 88.1kg [16 Sep 2023]  Daily 88.9kg [15 Sep 2023]   Daily 89.1 [14 Sep 2023]  Daily 92.9kg [6 Sep 2023]  Daily 91.8kg [27 Aug 2023]  Daily 101kg [9 Aug 2023]  Daily   102.1kg [10 July 2023]  Daily 99.9kg [9 July 2023]    Weight Change: weight trending down throughout admission. Recommend continue weekly/daily weights for trending & ensuring adequacy of nutrition provision    IBW: 86.4kg   % IBW: 98.6%    Estimated energy needs:   Current body wt [85.2kg] used for energy calculations as pt <100% IBW. Needs estimated for age and adjusted for current clinical status, increased needs for post-op & open abd wound healing    Calories: 5886-8943 kcals based on 30-40 kcals/kg  Protein: 127.8-170.4 g protein based on 1.5-2.0g protein/kg + additional depending on outputs  *Fluid needs per team    Subjective:   78 M w/ Crohn's, AFib/Flutter s/p DCCVs on amiodarone, remote ileocectomy and open appendectomy. Admitted (6/23) for SBO vs Crohns flare, s/p NGT decompression and s/p lap TRE converted to open TRE, SBR x 3, left in discontinuity with abthera vac on (6/27), RTOR for ileocolic resection, small bowel anastomosis, and abdominal wall closure on (6/28), c/b fluid collection s/p IR aspiration of perihepatic fluid on (7/3), c/b wound dehiscence s/p RTOR exlap, washout, ileocolic resection with end ileostomy, blow hole colostomy, red rubber from ileostomy to small bowel anastomosis; vicryl bridging mesh on (7/5) transferred to SICU postoperatively for hemodynamic monitoring, with hospital course complicated by periods of AMS most recently on 9/1 and associated with oliguria, severe ELVI and hyponatremia, which resolved but stepped back up for to SICU on 9/10 for acute AMS, intermittent hypoglycemia, AFib with RVR. Percutaneous Cholecystostomy placed on 9/11 for enlarged GB, PCT capped 10/5.    Pt seen in room, awake, alert, OOB in chair. Breathing comfortably on room air. TPN running at goal of 74ml/hr (continues to run over 18 hours d/t c/f TPN cholestasis/elevated LFTs). Has been drinking Ensure 1x/d and was permitted to have an egg per Dr. Peterson. Pt reports that some of egg was in output, but not entirety (possibly decreased absorption of yolk/fat portion of egg?). Looking forward to continuing to try PO intake. Denied N/V or abdominal pain. Continues on opium tincture Q6hrs, cholestyramine, lomotil, synthroid [administer 30-60 minutes before food; separate at least 4hrs from calcium or iron-containing products or bile acid sequestrants], imodium Q6hrs, zofran. Afebrile. Pertinent nutrition labs: /ALT 86 (slightly up from day prior), ,  (10/25), copper 17 (L, 10/16), prealbumin 12 (L).  RDN will continue to monitor, reassess, and intervene as appropriate.     Previous Nutrition Diagnosis:  Increased Nutrient Needs R/T physiological demands for nutrient AEB post-op wound healing    Active [ X  ]  Resolved [   ]    If resolved, new PES:     Goal:  Pt will meet at least 75% of protein & energy needs via most appropriate route for nutrition     Recommendations:  1. c/w TPN via PICC as primary means to nutrition- 411g Dex, 144g AA, 100g Lipids; provides 2973.4 kcals, 144g protein, GIR 3.35 mg/kg/min   - provides 34.9 kcals/kg, 28.1 non-protein kcals/kg, 1.7g protein/kg IBW  - consider decreasing lipids if worsening LFTs  - cycle TPN over 18hrs [6pm-12pm], consider 12-16hrs  - MVI, selenium, zinc in bag  - monitor weights & wound healing, adjust substrates as indicated  - pending copper, manganese levels- adjust in bag prn [consider giving 2-3x per week]  2. Fluid & lytes per team  - additional fluid bolus prn  3. PO diet per team/MD discretion  - Ensure Max daily; provides 150 kcals, 30g protein [low sugar, high protein content]  - advance/hold PO as indicated  4. Weekly lipid panel   - hold/decrease lipids if TG >400  5. Monitor BMP/Mg/Phos, POC BG  - monitor & replenish lytes outside TPN bag prn  6. Continue close monitoring of clinical course & adjust recommendations prn  7. Monitor feasibility for advancing PO diet    Education: current nutrition provision    Risk Level: High [ X  ] Moderate [   ] Low [   ].

## 2023-10-29 NOTE — PROGRESS NOTE ADULT - NS ATTEND AMEND GEN_ALL_CORE FT
77 yo M w/ Chrons, prolonged and complex hospital course as above s/p multiple bowel resections and on TPN due to bowel being left in discontinuity and hospital course further complicated by AMS, hypoglycemia, AF w/ RVR, and acalculous cholecystitis s/p cholecystostomy. Increasing bili despite uncapped tube, plan for MRCP. TPN adjusted. Diarrhea better controlled on imodium, lomotil, tincture of opium. Mild increase in Cr, obtained ua, will give fluid bolus to maintain net even state. Rest as above.

## 2023-10-29 NOTE — PROGRESS NOTE ADULT - ASSESSMENT
78 M w/ Crohn's, AFib/Flutter s/p DCCVs on amiodarone, remote ileocectomy and open appendectomy. Admitted (6/23) for SBO vs Crohns flare, s/p NGT decompression and s/p lap TRE converted to open TRE, SBR x 3, left in discontinuity with abthera vac on (6/27), RTOR for ileocolic resection, small bowel anastomosis, and abdominal wall closure on (6/28), c/b fluid collection s/p IR aspiration of perihepatic fluid on (7/3), c/b wound dehiscence s/p RTOR exlap, washout, ileocolic resection with end ileostomy, blow hole colostomy, red rubber from ileostomy to small bowel anastomosis; vicryl bridging mesh on (7/5) transferred to SICU postoperatively for hemodynamic monitoring, with hospital course complicated by periods of AMS most recently on 9/1 and associated with oliguria, severe ELVI and hyponatremia, which resolved but stepped back up for to SICU on 9/10 for acute AMS, intermittent hypoglycemia, AFib with RVR. Percutaneous Cholecystostomy placed on 9/11 for enlarged GB, PCT capped 10/5 and uncapped 10/25 for hyperbilirubinemia.    NEURO: AMS (resolved), EEG neg. Hx of depression - Effexor Dc'd on 9/10 due to delirium. Cont Melatonin prn Insomnia, Cont Zofran. Lorazepam prn.  HENT: Cepacol  CV: Hx Afib/flutter: s/p DCCV, Off amiodarone given transaminitis; Continue Metoprolol 5q6. Hx HLD: Lipitor Held given high LFTs. TTE  (7/18) - PASP 64mmHg, EF 65-70%,  Hx HTN -  holding Norvasc 10qd. Holding Losartan  Cont Lopressor 5 q6.   PULM: no resp distress on room air.   GI/FEN: Cont Ensure max x1/day. May have 1 egg today per Dr. Peterson. Cont TPN (2.5liters/day volume) for high ostomy and ECF output: Cont Octreotide in TPN, Imodium, Lomotil, Tincture of Opium. Transaminitis of unknown origin, now stable. s/p Perc Choley on 9/11 - tube uncapped 10/25 for worsening hyperbilirubinemia. Plan for MRCP. PPI. Significant drainge from fistula,  bolus as needed to keep I&O even. Cholestyramine started 10/18. GI following, considering Gattex  : Dry appearing on exam. Creatinine rising 1.22 from 1.16. Urine lytes pending. 500cc bolus. Voids  ENDO: No need to check ISS. Hx hypothyroid: IV Synthroid, TSH wnl on 10/23  ID: Lekocytosis 10/26, afebrile. Numerous SICU admissions for sepsis. Currently off abx. Cont Nystatin powder // PREVIOUSLY had C. tertium, Lactobacillis from his IR cx 7/3, and candida albicans, lactobacillus, vanc sensitive E faecium, vanc resistant E gallinarum, vanc resistant E casseliflavus, lactobacillus from his OR cx 7/5. Completed course of abx with Imipenem (9/10-9/15). Imipenem (8/26--) imipenem (6/30-7/12, 7/23-7/30), Dapto (6/30-7/5 and 7/23-7/24). Empiric dapto (8/23-24) and cefepime (8/23-24).   PPX: SCDs, SQH  LINES: PIVs, LUE PICC (9/1- )  WOUNDS/DRAINS: Abdominal wound and fistula with wound manager in place dressing change T & F. RLQ Ostomy. JOYCE. IR PCT.   PT: Ambulate as tolerated   DISPO: SICU due to complicated hospital course. but will do vitals q4hrs (hold overnight), and CBC MWF, BMP QD in setting of high fistula output

## 2023-10-30 NOTE — PROGRESS NOTE ADULT - ASSESSMENT
78 M w/ Crohn's, AFib/Flutter s/p DCCVs on amiodarone, remote ileocectomy and open appendectomy. Admitted (6/23) for SBO vs Crohns flare, s/p NGT decompression and s/p lap TRE converted to open TRE, SBR x 3, left in discontinuity with abthera vac on (6/27), RTOR for ileocolic resection, small bowel anastomosis, and abdominal wall closure on (6/28), c/b fluid collection s/p IR aspiration of perihepatic fluid on (7/3), c/b wound dehiscence s/p RTOR exlap, washout, ileocolic resection with end ileostomy, blow hole colostomy, red rubber from ileostomy to small bowel anastomosis; vicryl bridging mesh on (7/5) transferred to SICU postoperatively for hemodynamic monitoring, with hospital course complicated by periods of AMS most recently on 9/1 and associated with oliguria, severe ELVI and hyponatremia, which resolved but stepped back up for to SICU on 9/10 for acute AMS, intermittent hypoglycemia, AFib with RVR. Percutaneous Cholecystostomy placed on 9/11 for enlarged GB, PCT capped 10/5 and uncapped 10/25 for hyperbilirubinemia.    NEURO: AMS (resolved), EEG neg. Hx of depression - Effexor Dc'd on 9/10 due to delirium. Cont Melatonin prn Insomnia, Cont Zofran. Lorazepam prn.  HENT: Cepacol  CV: Hx Afib/flutter: s/p DCCV, Off amiodarone given transaminitis; Continue Metoprolol 5q6. Hx HLD: Lipitor Held given high LFTs. TTE  (7/18) - PASP 64mmHg, EF 65-70%,  Hx HTN -  holding Norvasc 10qd. Holding Losartan. Cont Lopressor 5 q6.   PULM: no resp distress on room air.   GI/FEN: Cont Ensure max x1/day. Cont TPN (2.5liters/day volume) for high ostomy and ECF output: Cont Octreotide in TPN, Imodium, Lomotil, Tincture of Opium. Transaminitis of unknown origin, now stable. s/p Perc Choley on 9/11 - tube uncapped 10/25 for worsening hyperbilirubinemia. Plan for MRCP. PPI. Significant drainge from fistula,  bolus as needed to keep I&O even. Cholestyramine started 10/18. GI following, considering Gattex.  : Voids. 500 cc for dry mucus membranes, keep net even.   ENDO: No need to check ISS. Hx hypothyroid: IV Synthroid, TSH wnl on 10/23  ID: Lekocytosis 10/26, afebrile. Numerous SICU admissions for sepsis. Currently off abx. Cont Nystatin powder // PREVIOUSLY had C. tertium, Lactobacillis from his IR cx 7/3, and candida albicans, lactobacillus, vanc sensitive E faecium, vanc resistant E gallinarum, vanc resistant E casseliflavus, lactobacillus from his OR cx 7/5. Completed course of abx with Imipenem (9/10-9/15). Imipenem (8/26--) imipenem (6/30-7/12, 7/23-7/30), Dapto (6/30-7/5 and 7/23-7/24). Empiric dapto (8/23-24) and cefepime (8/23-24).   PPX: SCDs, SQH  LINES: PIVs, LUE PICC (9/1- )  WOUNDS/DRAINS: Abdominal wound and fistula with wound manager in place dressing change T & F. RLQ Ostomy. JOYCE. IR PCT.   PT: Ambulate as tolerated   DISPO: SICU due to complicated hospital course. but will do vitals q4hrs (hold overnight), and CBC MWF, BMP QD in setting of high fistula output

## 2023-10-30 NOTE — PROGRESS NOTE ADULT - PROVIDER SPECIALTY LIST ADULT
Emily [phone].   [facility]

## 2023-10-30 NOTE — PROGRESS NOTE ADULT - SUBJECTIVE AND OBJECTIVE BOX
SUBJECTIVE: Pt seen and examined at bedside this am by surgery team. On Friday tolerated solid food ( 1 egg). Compliant with 1 ensure per day and stricti I&Os.     MEDICATIONS  (STANDING):  chlorhexidine 2% Cloths 1 Application(s) Topical <User Schedule>  cholestyramine Powder (Sugar-Free) 4 Gram(s) Oral daily  diphenoxylate/atropine 2 Tablet(s) Oral every 6 hours  glucagon  Injectable 1 milliGRAM(s) IntraMuscular once  heparin   Injectable 5000 Unit(s) SubCutaneous every 8 hours  influenza  Vaccine (HIGH DOSE) 0.7 milliLiter(s) IntraMuscular once  levothyroxine Injectable 40 MICROGram(s) IV Push at bedtime  lipid, fat emulsion (Fish Oil and Plant Based) 20% Infusion 1.2 Gm/kG/Day (41.67 mL/Hr) IV Continuous <Continuous>  loperamide 4 milliGRAM(s) Oral every 6 hours  metoprolol tartrate Injectable 5 milliGRAM(s) IV Push every 6 hours  nystatin Powder 1 Application(s) Topical three times a day  opium Tincture 6 milliGRAM(s) Oral every 6 hours  Parenteral Nutrition - Adult 1 Each TPN Continuous <Continuous>  Parenteral Nutrition - Adult 1 Each TPN Continuous <Continuous>    MEDICATIONS  (PRN):  LORazepam   Injectable 0.5 milliGRAM(s) IV Push at bedtime PRN Anxiety  ondansetron Injectable 4 milliGRAM(s) IV Push every 8 hours PRN Nausea and/or Vomiting      Vital Signs Last 24 Hrs  T(C): 37 (30 Oct 2023 10:07), Max: 37 (30 Oct 2023 10:07)  T(F): 98.6 (30 Oct 2023 10:07), Max: 98.6 (30 Oct 2023 10:07)  HR: 92 (30 Oct 2023 12:00) (88 - 94)  BP: 127/60 (30 Oct 2023 12:00) (104/58 - 128/66)  BP(mean): 76 (30 Oct 2023 08:05) (75 - 84)  RR: 24 (30 Oct 2023 12:00) (15 - 24)  SpO2: 99% (30 Oct 2023 12:00) (96% - 99%)    Parameters below as of 30 Oct 2023 12:00  Patient On (Oxygen Delivery Method): room air        Physical Exam:  General: NAD, resting comfortably in bed  Pulmonary: Nonlabored breathing, no respiratory distress  Cardiovascular: NSR  Abdominal: Soft, NT, ND abdomen. Wound manager in place draining bilious fluid. Wound continues to heal appropriately, with granulation tissue overlying wound. LUQ JOYCE in place. R side ileostomy with red rubber in place. Perc cony bilious.   Extremities: WWP, normal strength  Neuro: A/O x 3, CNs II-XII grossly intact, no focal deficits  d      I&O's Detail    29 Oct 2023 07:01  -  30 Oct 2023 07:00  --------------------------------------------------------  IN:    Fat Emulsion (Fish Oil &amp; Plant Based) 20% Infusion: 499.2 mL    IV PiggyBack: 500 mL    TPN (Total Parenteral Nutrition): 2793 mL  Total IN: 3792.2 mL    OUT:    Bulb (mL): 11 mL    Drain (mL): 525 mL    Drain (mL): 28 mL    Drain (mL): 710 mL    Voided (mL): 2750 mL  Total OUT: 4024 mL    Total NET: -231.8 mL      30 Oct 2023 07:01  -  30 Oct 2023 14:25  --------------------------------------------------------  IN:    Fat Emulsion (Fish Oil &amp; Plant Based) 20% Infusion: 83.2 mL    TPN (Total Parenteral Nutrition): 735 mL  Total IN: 818.2 mL    OUT:    Bulb (mL): 10 mL    Drain (mL): 250 mL    Drain (mL): 0 mL    Drain (mL): 180 mL    Voided (mL): 750 mL  Total OUT: 1190 mL    Total NET: -371.8 mL          LABS:                        7.8    8.89  )-----------( 189      ( 30 Oct 2023 05:30 )             24.3     10-30    132<L>  |  99  |  39<H>  ----------------------------<  124<H>  4.6   |  25  |  1.18    Ca    8.7      30 Oct 2023 05:30  Phos  3.5     10-30  Mg     2.1     10-30    TPro  8.0  /  Alb  2.4<L>  /  TBili  5.3<H>  /  DBili  x   /  AST  107<H>  /  ALT  83<H>  /  AlkPhos  142<H>  10-30      Urinalysis Basic - ( 30 Oct 2023 05:30 )    Color: x / Appearance: x / SG: x / pH: x  Gluc: 124 mg/dL / Ketone: x  / Bili: x / Urobili: x   Blood: x / Protein: x / Nitrite: x   Leuk Esterase: x / RBC: x / WBC x   Sq Epi: x / Non Sq Epi: x / Bacteria: x

## 2023-10-30 NOTE — PROGRESS NOTE ADULT - SUBJECTIVE AND OBJECTIVE BOX
INTERVAL/OVERNIGHT EVENTS: NAEO.     SUBJECTIVE: This AM doing well. Tolerating diet.     Neurologic Medications  LORazepam   Injectable 0.5 milliGRAM(s) IV Push at bedtime PRN Anxiety  ondansetron Injectable 4 milliGRAM(s) IV Push every 8 hours PRN Nausea and/or Vomiting  opium Tincture 6 milliGRAM(s) Oral every 6 hours    Cardiovascular Medications  metoprolol tartrate Injectable 5 milliGRAM(s) IV Push every 6 hours    Gastrointestinal Medications  diphenoxylate/atropine 2 Tablet(s) Oral every 6 hours  lipid, fat emulsion (Fish Oil and Plant Based) 20% Infusion 1.2 Gm/kG/Day IV Continuous <Continuous>  loperamide 4 milliGRAM(s) Oral every 6 hours  Parenteral Nutrition - Adult 1 Each TPN Continuous <Continuous>    Hematologic/Oncologic Medications  heparin   Injectable 5000 Unit(s) SubCutaneous every 8 hours  influenza  Vaccine (HIGH DOSE) 0.7 milliLiter(s) IntraMuscular once    Endocrine/Metabolic Medications  cholestyramine Powder (Sugar-Free) 4 Gram(s) Oral daily  glucagon  Injectable 1 milliGRAM(s) IntraMuscular once  levothyroxine Injectable 40 MICROGram(s) IV Push at bedtime    Topical/Other Medications  chlorhexidine 2% Cloths 1 Application(s) Topical <User Schedule>  nystatin Powder 1 Application(s) Topical three times a day      MEDICATIONS  (PRN):  LORazepam   Injectable 0.5 milliGRAM(s) IV Push at bedtime PRN Anxiety  ondansetron Injectable 4 milliGRAM(s) IV Push every 8 hours PRN Nausea and/or Vomiting    I&O's Detail    29 Oct 2023 07:01  -  30 Oct 2023 07:00  --------------------------------------------------------  IN:    Fat Emulsion (Fish Oil &amp; Plant Based) 20% Infusion: 499.2 mL    IV PiggyBack: 500 mL    TPN (Total Parenteral Nutrition): 2793 mL  Total IN: 3792.2 mL    OUT:    Bulb (mL): 11 mL    Drain (mL): 525 mL    Drain (mL): 28 mL    Drain (mL): 710 mL    Voided (mL): 2750 mL  Total OUT: 4024 mL    Total NET: -231.8 mL    30 Oct 2023 07:01  -  30 Oct 2023 10:24  --------------------------------------------------------  IN:    Fat Emulsion (Fish Oil &amp; Plant Based) 20% Infusion: 41.6 mL    TPN (Total Parenteral Nutrition): 147 mL  Total IN: 188.6 mL    OUT:  Total OUT: 0 mL    Total NET: 188.6     Vital Signs Last 24 Hrs  T(C): 36.4 (30 Oct 2023 08:00), Max: 36.8 (29 Oct 2023 22:30)  T(F): 97.6 (30 Oct 2023 08:00), Max: 98.3 (29 Oct 2023 22:30)  HR: 93 (30 Oct 2023 08:05) (88 - 94)  BP: 104/58 (30 Oct 2023 08:05) (104/58 - 138/65)  BP(mean): 76 (30 Oct 2023 08:05) (75 - 93)  RR: 18 (30 Oct 2023 08:05) (15 - 26)  SpO2: 96% (30 Oct 2023 08:05) (96% - 100%)    Parameters below as of 30 Oct 2023 08:05  Patient On (Oxygen Delivery Method): room air    Gen: No acute events   Neuro: A&OX3 No deficits  HENT: Dry mucus membranes, scleral icterus   CV: Regular rate and rhythm, no murmurs or gallops, no peripheral edema   Pulm: Lung sounds clear bilaterally   Abd: Soft NT ND + BS  Ext: Warm, well-perfused   Vasc: +2 radial/pedal pulses   Skin: Jaundice; no rashes noted  MSK: No joint swelling  Psych: No signs of anxiety or depression    LABS:                        7.8    8.89  )-----------( 189      ( 30 Oct 2023 05:30 )             24.3     10-30    132<L>  |  99  |  39<H>  ----------------------------<  124<H>  4.6   |  25  |  1.18    Ca    8.7      30 Oct 2023 05:30  Phos  3.5     10-30  Mg     2.1     10-30    TPro  8.0  /  Alb  2.4<L>  /  TBili  5.3<H>  /  DBili  x   /  AST  107<H>  /  ALT  83<H>  /  AlkPhos  142<H>  10-30    Urinalysis Basic - ( 30 Oct 2023 05:30 )    Color: x / Appearance: x / SG: x / pH: x  Gluc: 124 mg/dL / Ketone: x  / Bili: x / Urobili: x   Blood: x / Protein: x / Nitrite: x   Leuk Esterase: x / RBC: x / WBC x   Sq Epi: x / Non Sq Epi: x / Bacteria: x

## 2023-10-30 NOTE — PROGRESS NOTE ADULT - SUBJECTIVE AND OBJECTIVE BOX
78M with history of Crohn's, AFib/Flutter s/p DCCVs on amiodarone, remote ileocectomy and open appendectomy. Admitted (6/23) for SBO vs Crohns flare, s/p NGT decompression and s/p lap TRE converted to open TRE, SBR x 3, left in discontinuity with abthera vac on (6/27), RTOR for ileocolic resection, small bowel anastomosis, and abdominal wall closure on (6/28), c/b fluid collection s/p IR aspiration of perihepatic fluid on (7/3), c/b wound dehiscence s/p RTOR exlap, washout, ileocolic resection with end ileostomy, blow hole colostomy, red rubber from ileostomy to small bowel anastomosis; vicryl bridging mesh on (7/5) transferred to SICU postoperatively for hemodynamic monitoring, with hospital course complicated by periods of AMS most recently on 9/1 and associated with oliguria, severe ELVI and hyponatremia, which resolved but stepped back up for to SICU on 9/10 for acute AMS, intermittent hypoglycemia, AFib with RVR. Percutaneous Cholecystostomy placed on 9/11 for enlarged GB, PCT capped 10/5, and connected to drainage bag 10/25.    Percutaneous cholecystostomy tube with 525 cc bilious output past 24 hr, previously 550 cc.  -Strict I/O, monitor output

## 2023-10-30 NOTE — PROGRESS NOTE ADULT - ASSESSMENT
78 M w/ Crohn's, AFib/Flutter s/p DCCVs on amiodarone, remote ileocectomy and open appendectomy. Admitted (6/23) for SBO vs Crohns flare, s/p NGT decompression and s/p lap TRE converted to open TRE, SBR x 3, left in discontinuity with abthera vac on (6/27), RTOR for ileocolic resection, small bowel anastomosis, and abdominal wall closure on (6/28), c/b fluid collection s/p IR aspiration of perihepatic fluid on (7/3), c/b wound dehiscence s/p RTOR exlap, washout, ileocolic resection with end ileostomy, blow hole colostomy, red rubber from ileostomy to small bowel anastomosis; vicryl bridging mesh on (7/5) transferred to SICU postoperatively for hemodynamic monitoring, with hospital course complicated by periods of AMS most recently on 9/1 and associated with oliguria, severe ELVI and hyponatremia, which resolved but stepped back up for to SICU on 9/10 for acute AMS, intermittent hypoglycemia, AFib with RVR. Percutaneous Cholecystostomy placed on 9/11 for enlarged GB, PCT capped 10/5.    Plan:  - Pending MRCP   - Trend LFTs, keep perc cony uncapped  - C/w TPN  - Continue to monitor strict I&Os, bolus as needed for repletion  - Pending as per GI Gattex authorization, however, do not start Gattex without multidisciplinary discussion  - Wound manager changes as per wound care nursing staff (next today, Friday 10/27)  Team 5 will continue to follow, please call with any questions or concerns

## 2023-10-30 NOTE — PROGRESS NOTE ADULT - SUBJECTIVE AND OBJECTIVE BOX
SUBJECTIVE:  Doing well this AM. NAEON. Denies n/v. Endorses f/bm. Denies cp/sob. Pain is well controlled. Ambulating as tolerated. Tolerating diet.    MEDICATIONS  (STANDING):  chlorhexidine 2% Cloths 1 Application(s) Topical <User Schedule>  cholestyramine Powder (Sugar-Free) 4 Gram(s) Oral daily  diphenoxylate/atropine 2 Tablet(s) Oral every 6 hours  glucagon  Injectable 1 milliGRAM(s) IntraMuscular once  heparin   Injectable 5000 Unit(s) SubCutaneous every 8 hours  influenza  Vaccine (HIGH DOSE) 0.7 milliLiter(s) IntraMuscular once  levothyroxine Injectable 40 MICROGram(s) IV Push at bedtime  lipid, fat emulsion (Fish Oil and Plant Based) 20% Infusion 1.2 Gm/kG/Day (41.67 mL/Hr) IV Continuous <Continuous>  loperamide 4 milliGRAM(s) Oral every 6 hours  metoprolol tartrate Injectable 5 milliGRAM(s) IV Push every 6 hours  nystatin Powder 1 Application(s) Topical three times a day  opium Tincture 6 milliGRAM(s) Oral every 6 hours  Parenteral Nutrition - Adult 1 Each TPN Continuous <Continuous>    MEDICATIONS  (PRN):  LORazepam   Injectable 0.5 milliGRAM(s) IV Push at bedtime PRN Anxiety  ondansetron Injectable 4 milliGRAM(s) IV Push every 8 hours PRN Nausea and/or Vomiting      Vital Signs Last 24 Hrs  T(C): 36.4 (30 Oct 2023 08:00), Max: 36.8 (29 Oct 2023 22:30)  T(F): 97.6 (30 Oct 2023 08:00), Max: 98.3 (29 Oct 2023 22:30)  HR: 93 (30 Oct 2023 08:05) (88 - 94)  BP: 104/58 (30 Oct 2023 08:05) (104/58 - 138/65)  BP(mean): 76 (30 Oct 2023 08:05) (75 - 93)  RR: 18 (30 Oct 2023 08:05) (15 - 26)  SpO2: 96% (30 Oct 2023 08:05) (96% - 100%)    Parameters below as of 30 Oct 2023 08:05  Patient On (Oxygen Delivery Method): room air        Physical Exam:  General: NAD, resting comfortably in bed  Pulmonary: Nonlabored breathing, no respiratory distress  Cardiovascular: NSR  Abdominal: Soft, NT, ND abdomen. Wound manager in place draining bilious fluid. Wound continues to heal appropriately, with granulation tissue overlying wound. LUQ JOYCE in place. R side ileostomy with red rubber in place. Perc cony bilious.   Extremities: WWP, normal strength  Neuro: A/O x 3, CNs II-XII grossly intact, no focal deficits    I&O's Summary    29 Oct 2023 07:01  -  30 Oct 2023 07:00  --------------------------------------------------------  IN: 3792.2 mL / OUT: 4024 mL / NET: -231.8 mL    30 Oct 2023 07:01  -  30 Oct 2023 09:25  --------------------------------------------------------  IN: 188.6 mL / OUT: 0 mL / NET: 188.6 mL        LABS:                        7.8    8.89  )-----------( 189      ( 30 Oct 2023 05:30 )             24.3     10-30    132<L>  |  99  |  39<H>  ----------------------------<  124<H>  4.6   |  25  |  1.18    Ca    8.7      30 Oct 2023 05:30  Phos  3.5     10-30  Mg     2.1     10-30    TPro  8.0  /  Alb  2.4<L>  /  TBili  5.3<H>  /  DBili  x   /  AST  107<H>  /  ALT  83<H>  /  AlkPhos  142<H>  10-30      Urinalysis Basic - ( 30 Oct 2023 05:30 )    Color: x / Appearance: x / SG: x / pH: x  Gluc: 124 mg/dL / Ketone: x  / Bili: x / Urobili: x   Blood: x / Protein: x / Nitrite: x   Leuk Esterase: x / RBC: x / WBC x   Sq Epi: x / Non Sq Epi: x / Bacteria: x    LIVER FUNCTIONS - ( 30 Oct 2023 05:30 )  Alb: 2.4 g/dL / Pro: 8.0 g/dL / ALK PHOS: 142 U/L / ALT: 83 U/L / AST: 107 U/L / GGT: x

## 2023-10-31 NOTE — PROGRESS NOTE ADULT - ASSESSMENT
78M w PMH Crohn's, AFib/Flutter s/p DCCVs on amiodarone, remote ileocectomy and open appendectomy. Admitted (6/23) for SBO vs Crohns flare, s/p NGT decompression and s/p lap converted to open TRE, SBR x 3, left in discontinuity with abthera vac on (6/27), RTOR for ileocolic resection, small bowel anastomosis, and abdominal wall closure on (6/28), c/b fluid collection s/p IR aspiration of perihepatic fluid on (7/3), c/b wound dehiscence s/p RTOR exlap, washout, ileocolic resection with end ileostomy, blow hole colostomy, red rubber from ileostomy to small bowel anastomosis; vicryl bridging mesh on (7/5) transferred to SICU postoperatively for hemodynamic monitoring, with hospital course complicated by periods of severe ELVI and hyponatremia, which resolved but stepped back up for to SICU on (9/10) for acute AMS, intermittent hypoglycemia, AFib with RVR. Percutaneous Cholecystostomy placed on (9/11) for enlarged GB, PCT capped 10/5 and uncapped 10/25 for hyperbilirubinemia.     NEURO: AMS (resolved), EEG neg. Hx of depression - Effexor resumed. Cont Melatonin prn Insomnia, Cont Zofran. Lorazepam prn. Generalized fatigue and sweats while ambulating-- RVP/Covid neg, orthostatics BP wnl.   HENT: Cepacol  CV: Hx Afib/flutter: s/p DCCV, Off amiodarone given transaminitis; Continue Metoprolol 5q6. Hx HLD: Lipitor Held given high LFTs. TTE  (7/18) - PASP 64mmHg, EF 65-70%,  Hx HTN -  holding Norvasc 10qd. Holding Losartan. Cont Lopressor 5 q6.   PULM: no resp distress on room air.   GI/FEN: Cont Ensure max x1/day. Cont TPN (2.3 L/day volume) for high ostomy and ECF output: Cont Octreotide in TPN, Imodium, Lomotil, Tincture of Opium, Cholestyramine 10/18. Transaminitis of unknown origin, now stable. s/p Perc Choley on 9/11 - tube uncapped 10/25 for worsening hyperbilirubinemia. MRCP done. PPI. Significant drainge from fistula, bolus as needed to keep I&O even. GI following, considering Gattex.  : Voids. 500 cc for dry mucus membranes, keep net even.   ENDO: No need to check ISS. Hx hypothyroid: IV Synthroid, TSH wnl on 10/23  ID: Currently off abx. Cont Nystatin powder // PREVIOUSLY had C. tertium, Lactobacillis from his IR cx 7/3, and candida albicans, lactobacillus, vanc sensitive E faecium, vanc resistant E gallinarum, vanc resistant E casseliflavus, lactobacillus from his OR cx 7/5. Completed course of abx with Imipenem (9/10-9/15). Imipenem (8/26--) imipenem (6/30-7/12, 7/23-7/30), Dapto (6/30-7/5 and 7/23-7/24). Empiric dapto (8/23-24) and cefepime (8/23-24).   PPX: SCDs, SQH  LINES: PIVs, LUE PICC (9/1- )  WOUNDS/DRAINS: Abdominal wound and fistula with wound manager in place dressing change T & F. RLQ Ostomy. JOYCE. IR PCT.   PT: Ambulate as tolerated   DISPO: SICU due to complicated hospital course. but will do vitals q4hrs (hold overnight), and CBC/BMP QD in setting of high fistula output.

## 2023-10-31 NOTE — ADVANCED PRACTICE NURSE CONSULT - ASSESSMENT
New fistula/wound manager applied to site at mid-abdomen. Skin thoroughly cleansed.  Vashe soak applied to wound bed.  Cavilon No Sting Barrier film applied to periwound, Adapt ostomy rings, stoma paste applied around wound and within creases.  Entire area pouched with an Dustin wound manager # [id number]. 2 piece 1 3/4" pouch with Adapt ring applied to RLQ Ileostomy. Site measures 10.5 x 6.5 cm. Effluent in old pouch darker, serosanguinous. Hypergranulation to center of wound bed, epitheliazation noted to all wound edges.

## 2023-10-31 NOTE — PROGRESS NOTE ADULT - SUBJECTIVE AND OBJECTIVE BOX
ON: received 750 cc bolus, total Net negative 696.     SUBJECTIVE: Pt seen and examined at bedside this am by surgery team. Tolerating ensure, ice chips and an egg.     MEDICATIONS  (STANDING):  chlorhexidine 2% Cloths 1 Application(s) Topical <User Schedule>  cholestyramine Powder (Sugar-Free) 4 Gram(s) Oral daily  diphenoxylate/atropine 2 Tablet(s) Oral every 6 hours  glucagon  Injectable 1 milliGRAM(s) IntraMuscular once  heparin   Injectable 5000 Unit(s) SubCutaneous every 8 hours  influenza  Vaccine (HIGH DOSE) 0.7 milliLiter(s) IntraMuscular once  levothyroxine Injectable 40 MICROGram(s) IV Push at bedtime  lipid, fat emulsion (Fish Oil and Plant Based) 20% Infusion 1.2 Gm/kG/Day (41.67 mL/Hr) IV Continuous <Continuous>  loperamide 4 milliGRAM(s) Oral every 6 hours  metoprolol tartrate Injectable 5 milliGRAM(s) IV Push every 6 hours  nystatin Powder 1 Application(s) Topical three times a day  opium Tincture 6 milliGRAM(s) Oral every 6 hours  Parenteral Nutrition - Adult 1 Each TPN Continuous <Continuous>    MEDICATIONS  (PRN):  LORazepam   Injectable 0.5 milliGRAM(s) IV Push at bedtime PRN Anxiety  ondansetron Injectable 4 milliGRAM(s) IV Push every 8 hours PRN Nausea and/or Vomiting      Vital Signs Last 24 Hrs  T(C): 36.8 (31 Oct 2023 01:11), Max: 37 (30 Oct 2023 10:07)  T(F): 98.2 (31 Oct 2023 01:11), Max: 98.6 (30 Oct 2023 10:07)  HR: 92 (31 Oct 2023 05:52) (85 - 93)  BP: 129/56 (31 Oct 2023 05:52) (104/58 - 138/63)  BP(mean): 81 (31 Oct 2023 05:52) (76 - 91)  RR: 20 (31 Oct 2023 05:52) (18 - 25)  SpO2: 97% (31 Oct 2023 05:52) (93% - 99%)    Parameters below as of 31 Oct 2023 05:52  Patient On (Oxygen Delivery Method): room air      Physical Exam:  General: NAD, resting comfortably in bed  Pulmonary: Nonlabored breathing, no respiratory distress  Cardiovascular: NSR  Abdominal: Soft, NT, ND abdomen. Wound manager in place draining bilious fluid. Wound continues to heal appropriately, with granulation tissue overlying wound. LUQ JOYCE in place. R side ileostomy with red rubber in place. Perc cony bilious.   Extremities: WWP, normal strength  Neuro: A/O x 3, CNs II-XII grossly intact, no focal deficits    I&O's Detail    29 Oct 2023 07:01  -  30 Oct 2023 07:00  --------------------------------------------------------  IN:    Fat Emulsion (Fish Oil &amp; Plant Based) 20% Infusion: 499.2 mL    IV PiggyBack: 500 mL    TPN (Total Parenteral Nutrition): 2793 mL  Total IN: 3792.2 mL    OUT:    Bulb (mL): 11 mL    Drain (mL): 525 mL    Drain (mL): 28 mL    Drain (mL): 710 mL    Voided (mL): 2750 mL  Total OUT: 4024 mL    Total NET: -231.8 mL      30 Oct 2023 07:01  -  31 Oct 2023 06:36  --------------------------------------------------------  IN:    Fat Emulsion (Fish Oil &amp; Plant Based) 20% Infusion: 83.2 mL    Fat Emulsion (Fish Oil &amp; Plant Based) 20% Infusion: 458.7 mL    Oral Fluid: 360 mL    TPN (Total Parenteral Nutrition): 2220 mL  Total IN: 3121.9 mL    OUT:    Bulb (mL): 18 mL    Drain (mL): 0 mL    Drain (mL): 565 mL    Drain (mL): 810 mL    Voided (mL): 2425 mL  Total OUT: 3818 mL    Total NET: -696.1 mL          LABS:                        7.5    8.01  )-----------( 189      ( 31 Oct 2023 05:30 )             23.5     10-31    136  |  102  |  38<H>  ----------------------------<  126<H>  4.0   |  25  |  1.18    Ca    8.4      31 Oct 2023 05:30  Phos  3.4     10-31  Mg     2.0     10-31    TPro  7.6  /  Alb  2.3<L>  /  TBili  5.5<H>  /  DBili  x   /  AST  109<H>  /  ALT  84<H>  /  AlkPhos  143<H>  10-31      Urinalysis Basic - ( 31 Oct 2023 05:30 )    Color: x / Appearance: x / SG: x / pH: x  Gluc: 126 mg/dL / Ketone: x  / Bili: x / Urobili: x   Blood: x / Protein: x / Nitrite: x   Leuk Esterase: x / RBC: x / WBC x   Sq Epi: x / Non Sq Epi: x / Bacteria: x        RADIOLOGY & ADDITIONAL STUDIES:    MR MRCP [facility] IC ORDERED BY: JENNY GERONIMO    PROCEDURE DATE: 10/30/2023        INTERPRETATION: CLINICAL INFORMATION: Hyperbilirubinemia; cholestasis; percutaneous cholecystostomy tube    COMPARISON: 9/19/2023 CT    CONTRAST/COMPLICATIONS:  IV Contrast: Gadavist 7 cc administered 3 cc discarded  Oral Contrast: NONE  Complications: None reported at time of study completion    PROCEDURE:  MRI/MRCP was performed. Radial and 3D MRCP sequences were obtained.      FINDINGS:  LOWER CHEST: Small to moderate right pleural effusion with right basilar focal atelectasis, not significant changed. Improved appearance of left lower lobe with resolution of left lower lobe focal atelectasis and near complete resolution of a left pleural effusion.    LIVER: Scattered, nonenhancing, T2 hyperintense, T1 hypointense intense foci. The largest is seen in the posterior aspect of the right hepatic lobe, measuring 1.9 cm. This is compatible with a small cyst. The others are too small to definitely characterize but are likely small cysts.  BILE DUCTS: Normal caliber.  GALLBLADDER: Decompressed by a cholecystostomy tube in unremarkable position. No right upper quadrant fluid collections.  SPLEEN: Within normal limits.  PANCREAS: Atrophic. 3 T2 hyperintense, T1 hypointense, nonenhancing foci are seen in the pancreas. One is seen in the pancreatic tail measuring 9 x 13 mm. Another is seen in the pancreatic neck measuring 11 x 12 mm. A 10 mm cystic focus is seen in the head of the pancreas (6:31). These are composed of microcysts. There is diffuse mild dilatation of the pancreatic duct in the body and tail of the pancreas.  ADRENALS: Within normal limits.  KIDNEYS/URETERS: No renal collecting system obstruction. Scattered bilateral renal cysts.    VISUALIZED PORTIONS:  BOWEL: 2.7 cm duodenal diverticulum extending from the second portion of the duodenum. No bowel obstruction.  PERITONEUM: No ascites.  VESSELS: Within normal limits.  RETROPERITONEUM/LYMPH NODES: No lymphadenopathy.  ABDOMINAL WALL: Midline ostomy noted anteriorly.  BONES: No suspicious osseous lesions    IMPRESSION:  1. Cholecystostomy tube in unremarkable position within the gallbladder which is decompressed  2. No biliary ductal dilatation.  3. 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 These are generally thought to be benign, however, recommend annual MRI follow-up for assessment of stability. Diffuse mild pancreatic ductal dilatation.

## 2023-10-31 NOTE — PROGRESS NOTE ADULT - ASSESSMENT
78 M w/ Crohn's, AFib/Flutter s/p DCCVs on amiodarone, remote ileocectomy and open appendectomy. Admitted (6/23) for SBO vs Crohns flare, s/p NGT decompression and s/p lap TRE converted to open TRE, SBR x 3, left in discontinuity with abthera vac on (6/27), RTOR for ileocolic resection, small bowel anastomosis, and abdominal wall closure on (6/28), c/b fluid collection s/p IR aspiration of perihepatic fluid on (7/3), c/b wound dehiscence s/p RTOR exlap, washout, ileocolic resection with end ileostomy, blow hole colostomy, red rubber from ileostomy to small bowel anastomosis; vicryl bridging mesh on (7/5) transferred to SICU postoperatively for hemodynamic monitoring, with hospital course complicated by periods of AMS most recently on 9/1 and associated with oliguria, severe ELVI and hyponatremia, which resolved but stepped back up for to SICU on 9/10 for acute AMS, intermittent hypoglycemia, AFib with RVR. 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 2.7 cm duodenal diverticulum.     Plan:    - Trend LFTs, keep perc cony uncapped  - C/w TPN  - Continue to monitor strict I&Os, bolus as needed for repletion  - Pending as per GI Gattex authorization, however, do not start Gattex without multidisciplinary discussion  - Wound manager changes as per wound care nursing staff (Tuesdays and Fridays)  Team 5 will continue to follow, please call with any questions or concerns   78 M w/ Crohn's, AFib/Flutter s/p DCCVs on amiodarone, remote ileocectomy and open appendectomy. Admitted (6/23) for SBO vs Crohns flare, s/p NGT decompression and s/p lap TRE converted to open TRE, SBR x 3, left in discontinuity with abthera vac on (6/27), RTOR for ileocolic resection, small bowel anastomosis, and abdominal wall closure on (6/28), c/b fluid collection s/p IR aspiration of perihepatic fluid on (7/3), c/b wound dehiscence s/p RTOR exlap, washout, ileocolic resection with end ileostomy, blow hole colostomy, red rubber from ileostomy to small bowel anastomosis; vicryl bridging mesh on (7/5) transferred to SICU postoperatively for hemodynamic monitoring, with hospital course complicated by periods of AMS most recently on 9/1 and associated with oliguria, severe ELVI and hyponatremia, which resolved but stepped back up for to SICU on 9/10 for acute AMS, intermittent hypoglycemia, AFib with RVR. 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 2.7 cm duodenal diverticulum.     Plan:  - Follow blood and bile cultures sent 10/30  - Trend LFTs, keep perc cony uncapped  - C/w TPN  - Continue to monitor strict I&Os, bolus as needed for repletion  - Pending as per GI Gattex authorization, however, do not start Gattex without multidisciplinary discussion  - Wound manager changes as per wound care nursing staff (Tuesdays and Fridays)  Team 5 will continue to follow, please call with any questions or concerns

## 2023-10-31 NOTE — PROGRESS NOTE ADULT - SUBJECTIVE AND OBJECTIVE BOX
INTERVAL/OVERNIGHT EVENTS:    SUBJECTIVE:     POD #  SICU Day #    Neurologic Medications  LORazepam   Injectable 0.5 milliGRAM(s) IV Push at bedtime PRN Anxiety  ondansetron Injectable 4 milliGRAM(s) IV Push every 8 hours PRN Nausea and/or Vomiting  opium Tincture 6 milliGRAM(s) Oral every 6 hours  venlafaxine XR. 150 milliGRAM(s) Oral two times a day    Respiratory Medications    Cardiovascular Medications  metoprolol tartrate Injectable 5 milliGRAM(s) IV Push every 6 hours    Gastrointestinal Medications  diphenoxylate/atropine 2 Tablet(s) Oral every 6 hours  lipid, fat emulsion (Fish Oil and Plant Based) 20% Infusion 1.2 Gm/kG/Day IV Continuous <Continuous>  loperamide 4 milliGRAM(s) Oral every 6 hours  Parenteral Nutrition - Adult 1 Each TPN Continuous <Continuous>  Parenteral Nutrition - Adult 1 Each TPN Continuous <Continuous>    Genitourinary Medications    Hematologic/Oncologic Medications  heparin   Injectable 5000 Unit(s) SubCutaneous every 8 hours  influenza  Vaccine (HIGH DOSE) 0.7 milliLiter(s) IntraMuscular once    Antimicrobial/Immunologic Medications    Endocrine/Metabolic Medications  cholestyramine Powder (Sugar-Free) 4 Gram(s) Oral daily  glucagon  Injectable 1 milliGRAM(s) IntraMuscular once  levothyroxine Injectable 40 MICROGram(s) IV Push at bedtime    Topical/Other Medications  chlorhexidine 2% Cloths 1 Application(s) Topical <User Schedule>  nystatin Powder 1 Application(s) Topical three times a day      MEDICATIONS  (PRN):  LORazepam   Injectable 0.5 milliGRAM(s) IV Push at bedtime PRN Anxiety  ondansetron Injectable 4 milliGRAM(s) IV Push every 8 hours PRN Nausea and/or Vomiting      I&O's Detail    30 Oct 2023 07:01  -  31 Oct 2023 07:00  --------------------------------------------------------  IN:    Fat Emulsion (Fish Oil &amp; Plant Based) 20% Infusion: 83.2 mL    Fat Emulsion (Fish Oil &amp; Plant Based) 20% Infusion: 458.7 mL    Lactated Ringers Bolus: 750 mL    Oral Fluid: 360 mL    TPN (Total Parenteral Nutrition): 2355 mL  Total IN: 4006.9 mL    OUT:    Bulb (mL): 18 mL    Drain (mL): 810 mL    Drain (mL): 30 mL    Drain (mL): 650 mL    Voided (mL): 2725 mL  Total OUT: 4233 mL    Total NET: -226.1 mL      31 Oct 2023 07:01  -  31 Oct 2023 13:25  --------------------------------------------------------  IN:    TPN (Total Parenteral Nutrition): 750 mL  Total IN: 750 mL    OUT:    Drain (mL): 100 mL    Voided (mL): 370 mL  Total OUT: 470 mL    Total NET: 280 mL          Vital Signs Last 24 Hrs  T(C): 36.9 (31 Oct 2023 09:16), Max: 36.9 (31 Oct 2023 09:16)  T(F): 98.4 (31 Oct 2023 09:16), Max: 98.4 (31 Oct 2023 09:16)  HR: 91 (31 Oct 2023 12:00) (85 - 92)  BP: 113/53 (31 Oct 2023 12:00) (113/53 - 138/63)  BP(mean): 77 (31 Oct 2023 12:00) (77 - 91)  RR: 22 (31 Oct 2023 12:00) (18 - 31)  SpO2: 95% (31 Oct 2023 12:00) (93% - 98%)    Parameters below as of 31 Oct 2023 12:00  Patient On (Oxygen Delivery Method): room air        GENERAL: NAD, resting comfortably in bed  HEENT: NCAT, MMM  C/V: Normal rate, normal peripheral perfusion  PULM: Nonlabored breathing, no respiratory distress,   ABD: Soft, ND, NT, no rebound tenderness, no guarding  EXTREM: WWP, no edema, SCDs in place  NEURO: No focal deficits    LABS:                        7.5    8.01  )-----------( 189      ( 31 Oct 2023 05:30 )             23.5     10-31    136  |  102  |  38<H>  ----------------------------<  126<H>  4.0   |  25  |  1.18    Ca    8.4      31 Oct 2023 05:30  Phos  3.4     10-31  Mg     2.0     10-31    TPro  7.6  /  Alb  2.3<L>  /  TBili  5.5<H>  /  DBili  x   /  AST  109<H>  /  ALT  84<H>  /  AlkPhos  143<H>  10-31      Urinalysis Basic - ( 31 Oct 2023 05:30 )    Color: x / Appearance: x / SG: x / pH: x  Gluc: 126 mg/dL / Ketone: x  / Bili: x / Urobili: x   Blood: x / Protein: x / Nitrite: x   Leuk Esterase: x / RBC: x / WBC x   Sq Epi: x / Non Sq Epi: x / Bacteria: x        RADIOLOGY & ADDITIONAL STUDIES:      Culture - Blood (collected 10-30-23 @ 21:53)  Source: .Blood Blood  Preliminary Report (10-31-23 @ 11:01):    No growth at 12 hours    Culture - Blood (collected 10-30-23 @ 21:53)  Source: .Blood Blood  Preliminary Report (10-31-23 @ 11:00):    No growth at 12 hours    Culture - Acid Fast - Body Fluid w/Smear (collected 10-30-23 @ 21:24)  Source: Bile Bile Fluid     INTERVAL/OVERNIGHT EVENTS: MRCP completed, gallbladder decompressed, possible IPMNs that will need to be followed outpatient. Blood Cx and PCT drain Cx sent.     SUBJECTIVE: Pt states he has been feeling progressively more fatigued over the past 3 days. Every time he stands up to walk, he gets body sweats. No N/V and tolerating egg without abd pain. Voids without issues.    Neurologic Medications  LORazepam   Injectable 0.5 milliGRAM(s) IV Push at bedtime PRN Anxiety  ondansetron Injectable 4 milliGRAM(s) IV Push every 8 hours PRN Nausea and/or Vomiting  opium Tincture 6 milliGRAM(s) Oral every 6 hours  venlafaxine XR. 150 milliGRAM(s) Oral two times a day    Cardiovascular Medications  metoprolol tartrate Injectable 5 milliGRAM(s) IV Push every 6 hours    Gastrointestinal Medications  diphenoxylate/atropine 2 Tablet(s) Oral every 6 hours  lipid, fat emulsion (Fish Oil and Plant Based) 20% Infusion 1.2 Gm/kG/Day IV Continuous <Continuous>  loperamide 4 milliGRAM(s) Oral every 6 hours  Parenteral Nutrition - Adult 1 Each TPN Continuous <Continuous>    Hematologic/Oncologic Medications  heparin   Injectable 5000 Unit(s) SubCutaneous every 8 hours  influenza  Vaccine (HIGH DOSE) 0.7 milliLiter(s) IntraMuscular once    Endocrine/Metabolic Medications  cholestyramine Powder (Sugar-Free) 4 Gram(s) Oral daily  glucagon  Injectable 1 milliGRAM(s) IntraMuscular once  levothyroxine Injectable 40 MICROGram(s) IV Push at bedtime    Topical/Other Medications  chlorhexidine 2% Cloths 1 Application(s) Topical <User Schedule>  nystatin Powder 1 Application(s) Topical three times a day    MEDICATIONS  (PRN):  LORazepam   Injectable 0.5 milliGRAM(s) IV Push at bedtime PRN Anxiety  ondansetron Injectable 4 milliGRAM(s) IV Push every 8 hours PRN Nausea and/or Vomiting    I&O's Detail    30 Oct 2023 07:01  -  31 Oct 2023 07:00  --------------------------------------------------------  IN:    Fat Emulsion (Fish Oil &amp; Plant Based) 20% Infusion: 83.2 mL    Fat Emulsion (Fish Oil &amp; Plant Based) 20% Infusion: 458.7 mL    Lactated Ringers Bolus: 750 mL    Oral Fluid: 360 mL    TPN (Total Parenteral Nutrition): 2355 mL  Total IN: 4006.9 mL    OUT:    Bulb (mL): 18 mL    Drain (mL): 810 mL    Drain (mL): 30 mL    Drain (mL): 650 mL    Voided (mL): 2725 mL  Total OUT: 4233 mL    Total NET: -226.1 mL    31 Oct 2023 07:01  -  31 Oct 2023 13:25  --------------------------------------------------------  IN:    TPN (Total Parenteral Nutrition): 750 mL  Total IN: 750 mL    OUT:    Drain (mL): 100 mL    Voided (mL): 370 mL  Total OUT: 470 mL    Total NET: 280 mL    Vital Signs Last 24 Hrs  T(C): 36.9 (31 Oct 2023 09:16), Max: 36.9 (31 Oct 2023 09:16)  T(F): 98.4 (31 Oct 2023 09:16), Max: 98.4 (31 Oct 2023 09:16)  HR: 91 (31 Oct 2023 12:00) (85 - 92)  BP: 113/53 (31 Oct 2023 12:00) (113/53 - 138/63)  BP(mean): 77 (31 Oct 2023 12:00) (77 - 91)  RR: 22 (31 Oct 2023 12:00) (18 - 31)  SpO2: 95% (31 Oct 2023 12:00) (93% - 98%)    Parameters below as of 31 Oct 2023 12:00  Patient On (Oxygen Delivery Method): room air    GENERAL: NAD, resting comfortably in bed  HEENT: NCAT, dry mucus membranes  C/V: Normal rate, normal peripheral perfusion, no murmurs  PULM: Nonlabored breathing, no respiratory distress, CTA b/l  ABD: Soft, ND, NT, no rebound tenderness, no guarding, wound manager in place without leakage--brown output.   EXTREM: WWP, no edema, SCDs in place.  NEURO: No focal deficits.    LABS:                        7.5    8.01  )-----------( 189      ( 31 Oct 2023 05:30 )             23.5     10-31    136  |  102  |  38<H>  ----------------------------<  126<H>  4.0   |  25  |  1.18    Ca    8.4      31 Oct 2023 05:30  Phos  3.4     10-31  Mg     2.0     10-31    TPro  7.6  /  Alb  2.3<L>  /  TBili  5.5<H>  /  DBili  x   /  AST  109<H>  /  ALT  84<H>  /  AlkPhos  143<H>  10-31    Urinalysis Basic - ( 31 Oct 2023 05:30 )    Color: x / Appearance: x / SG: x / pH: x  Gluc: 126 mg/dL / Ketone: x  / Bili: x / Urobili: x   Blood: x / Protein: x / Nitrite: x   Leuk Esterase: x / RBC: x / WBC x   Sq Epi: x / Non Sq Epi: x / Bacteria: x    RADIOLOGY & ADDITIONAL STUDIES:    Culture - Blood (collected 10-30-23 @ 21:53)  Source: .Blood Blood  Preliminary Report (10-31-23 @ 11:01):    No growth at 12 hours    Culture - Blood (collected 10-30-23 @ 21:53)  Source: .Blood Blood  Preliminary Report (10-31-23 @ 11:00):    No growth at 12 hours    Culture - Acid Fast - Body Fluid w/Smear (collected 10-30-23 @ 21:24)  Source: Bile Bile Fluid

## 2023-10-31 NOTE — PROGRESS NOTE ADULT - NS ATTEND AMEND GEN_ALL_CORE FT
Crohn's, AF, s/p SBR, ileocolic resection, end ileostomy, multiple episodes of sepsis  physical as above  MRCP unrevealing  check cultures of blood and bile to look for signs of cholangitis with increasing LFTs which could also be from the TPN  TPN written  continue metoprolol  restart venlaxafine for his depression and anxiety  rest as above

## 2023-10-31 NOTE — PROGRESS NOTE ADULT - ASSESSMENT
78 M w/ Crohn's, AFib/Flutter s/p DCCVs on amiodarone, remote ileocectomy and open appendectomy. Admitted (6/23) for SBO vs Crohns flare, s/p NGT decompression and s/p lap TRE converted to open TRE, SBR x 3, left in discontinuity with abthera vac on (6/27), RTOR for ileocolic resection, small bowel anastomosis, and abdominal wall closure on (6/28), c/b fluid collection s/p IR aspiration of perihepatic fluid on (7/3), c/b wound dehiscence s/p RTOR exlap, washout, ileocolic resection with end ileostomy, blow hole colostomy, red rubber from ileostomy to small bowel anastomosis; vicryl bridging mesh on (7/5) transferred to SICU postoperatively for hemodynamic monitoring, with hospital course complicated by periods of AMS most recently on 9/1 and associated with oliguria, severe ELVI and hyponatremia, which resolved but stepped back up for to SICU on 9/10 for acute AMS, intermittent hypoglycemia, AFib with RVR. Percutaneous Cholecystostomy placed on 9/11 for enlarged GB, PCT capped 10/5. 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    Plan:  - Trend LFTs, keep perc cony uncapped  - C/w TPN  - Continue to monitor strict I&Os, bolus as needed for repletion  - Gattex authorization, however, do not start Gattex without multidisciplinary discussion  - Wound manager changes as per wound care nursing staff (next today, Friday 10/27)  Team 4c will continue to follow, please call with any questions or concerns

## 2023-10-31 NOTE — PROGRESS NOTE ADULT - SUBJECTIVE AND OBJECTIVE BOX
78M with history of Crohn's, AFib/Flutter s/p DCCVs on amiodarone, remote ileocectomy and open appendectomy. Admitted (6/23) for SBO vs Crohns flare, s/p NGT decompression and s/p lap TRE converted to open TRE, SBR x 3, left in discontinuity with abthera vac on (6/27), RTOR for ileocolic resection, small bowel anastomosis, and abdominal wall closure on (6/28), c/b fluid collection s/p IR aspiration of perihepatic fluid on (7/3), c/b wound dehiscence s/p RTOR exlap, washout, ileocolic resection with end ileostomy, blow hole colostomy, red rubber from ileostomy to small bowel anastomosis; vicryl bridging mesh on (7/5) transferred to SICU postoperatively for hemodynamic monitoring, with hospital course complicated by periods of AMS most recently on 9/1 and associated with oliguria, severe ELVI and hyponatremia, which resolved but stepped back up for to SICU on 9/10 for acute AMS, intermittent hypoglycemia, AFib with RVR. Percutaneous Cholecystostomy placed on 9/11 for enlarged GB, PCT capped 10/5, and connected to drainage bag 10/25.    Percutaneous cholecystostomy tube with 650 cc bilious output past 24 hr, previously 525 cc.  -Strict I/O, monitor output

## 2023-10-31 NOTE — PROGRESS NOTE ADULT - SUBJECTIVE AND OBJECTIVE BOX
SUBJECTIVE:  Doing well this AM. NAEON. Denies n/v. Endorses f/bm. Denies cp/sob. Pain is well controlled. Ambulating as tolerated. Tolerating diet.    MEDICATIONS  (STANDING):  chlorhexidine 2% Cloths 1 Application(s) Topical <User Schedule>  cholestyramine Powder (Sugar-Free) 4 Gram(s) Oral daily  diphenoxylate/atropine 2 Tablet(s) Oral every 6 hours  glucagon  Injectable 1 milliGRAM(s) IntraMuscular once  heparin   Injectable 5000 Unit(s) SubCutaneous every 8 hours  influenza  Vaccine (HIGH DOSE) 0.7 milliLiter(s) IntraMuscular once  levothyroxine Injectable 40 MICROGram(s) IV Push at bedtime  lipid, fat emulsion (Fish Oil and Plant Based) 20% Infusion 1.2 Gm/kG/Day (42.6 mL/Hr) IV Continuous <Continuous>  loperamide 4 milliGRAM(s) Oral every 6 hours  metoprolol tartrate Injectable 5 milliGRAM(s) IV Push every 6 hours  nystatin Powder 1 Application(s) Topical three times a day  opium Tincture 6 milliGRAM(s) Oral every 6 hours  Parenteral Nutrition - Adult 1 Each TPN Continuous <Continuous>  Parenteral Nutrition - Adult 1 Each TPN Continuous <Continuous>  venlafaxine XR. 150 milliGRAM(s) Oral two times a day    MEDICATIONS  (PRN):  LORazepam   Injectable 0.5 milliGRAM(s) IV Push at bedtime PRN Anxiety  ondansetron Injectable 4 milliGRAM(s) IV Push every 8 hours PRN Nausea and/or Vomiting      Vital Signs Last 24 Hrs  T(C): 36.9 (31 Oct 2023 09:16), Max: 36.9 (31 Oct 2023 09:16)  T(F): 98.4 (31 Oct 2023 09:16), Max: 98.4 (31 Oct 2023 09:16)  HR: 92 (31 Oct 2023 16:00) (85 - 92)  BP: 107/56 (31 Oct 2023 16:00) (107/56 - 138/63)  BP(mean): 78 (31 Oct 2023 16:00) (77 - 91)  RR: 32 (31 Oct 2023 16:00) (18 - 32)  SpO2: 95% (31 Oct 2023 12:00) (93% - 98%)    Parameters below as of 31 Oct 2023 16:00  Patient On (Oxygen Delivery Method): room air        Physical Exam:  General: NAD, resting comfortably in bed  Pulmonary: Nonlabored breathing, no respiratory distress  Cardiovascular: NSR  Abdominal: soft, NT/ND, Wound manager in place draining bilious fluid. Wound continues to heal appropriately, with granulation tissue overlying wound. LUQ JOYCE in place. R side ileostomy. Perc cony bilious.   Extremities: WWP, normal strength  Neuro: A/O x 3, CNs II-XII grossly intact, no focal deficits    I&O's Summary    30 Oct 2023 07:01  -  31 Oct 2023 07:00  --------------------------------------------------------  IN: 4006.9 mL / OUT: 4233 mL / NET: -226.1 mL    31 Oct 2023 07:01  -  31 Oct 2023 18:07  --------------------------------------------------------  IN: 1045 mL / OUT: 1022 mL / NET: 23 mL        LABS:                        7.5    8.01  )-----------( 189      ( 31 Oct 2023 05:30 )             23.5     10-31    136  |  102  |  38<H>  ----------------------------<  126<H>  4.0   |  25  |  1.18    Ca    8.4      31 Oct 2023 05:30  Phos  3.4     10-31  Mg     2.0     10-31    TPro  7.6  /  Alb  2.3<L>  /  TBili  5.5<H>  /  DBili  x   /  AST  109<H>  /  ALT  84<H>  /  AlkPhos  143<H>  10-31      Urinalysis Basic - ( 31 Oct 2023 05:30 )    Color: x / Appearance: x / SG: x / pH: x  Gluc: 126 mg/dL / Ketone: x  / Bili: x / Urobili: x   Blood: x / Protein: x / Nitrite: x   Leuk Esterase: x / RBC: x / WBC x   Sq Epi: x / Non Sq Epi: x / Bacteria: x        LIVER FUNCTIONS - ( 31 Oct 2023 05:30 )  Alb: 2.3 g/dL / Pro: 7.6 g/dL / ALK PHOS: 143 U/L / ALT: 84 U/L / AST: 109 U/L / GGT: x

## 2023-11-01 NOTE — PROGRESS NOTE ADULT - SUBJECTIVE AND OBJECTIVE BOX
Interval Events:  No events overnight.   Patient seen and examined at bedside.      Allergies    penicillin (Angioedema)    Intolerances        Vital Signs Last 24 Hrs  T(C): 36.5 (01 Nov 2023 05:02), Max: 36.9 (31 Oct 2023 09:16)  T(F): 97.7 (01 Nov 2023 05:02), Max: 98.5 (31 Oct 2023 22:26)  HR: 92 (01 Nov 2023 05:00) (89 - 92)  BP: 120/56 (01 Nov 2023 05:00) (107/56 - 131/61)  BP(mean): 79 (01 Nov 2023 05:00) (77 - 102)  RR: 16 (01 Nov 2023 05:00) (14 - 32)  SpO2: 98% (01 Nov 2023 05:00) (95% - 98%)    Parameters below as of 01 Nov 2023 05:00  Patient On (Oxygen Delivery Method): room air        10-31 @ 07:01 - 11-01 @ 07:00  --------------------------------------------------------  IN: 3319 mL / OUT: 2466 mL / NET: 853 mL      10-31 @ 07:01 - 11-01 @ 07:00  --------------------------------------------------------  IN: 3319 mL / OUT: 2466 mL / NET: 853 mL        Physical Exam:     Gen: No acute events   Neuro: A&OX3 No deficits  HENT: Dry mucus membranes, scleral icterus   CV: Regular rate and rhythm, no murmurs or gallops, no peripheral edema   Pulm: Lung sounds clear bilaterally   Abd: Soft NT ND + BS  Ext: Warm, well-perfused   Vasc: +2 radial/pedal pulses   Skin: Jaundice; no rashes noted  MSK: No joint swelling  Psych: No signs of anxiety or depression      LABS:      CBC Full  -  ( 31 Oct 2023 05:30 )  WBC Count : 8.01 K/uL  RBC Count : 2.34 M/uL  Hemoglobin : 7.5 g/dL  Hematocrit : 23.5 %  Platelet Count - Automated : 189 K/uL  Mean Cell Volume : 100.4 fl  Mean Cell Hemoglobin : 32.1 pg  Mean Cell Hemoglobin Concentration : 31.9 gm/dL  Auto Neutrophil # : x  Auto Lymphocyte # : x  Auto Monocyte # : x  Auto Eosinophil # : x  Auto Basophil # : x  Auto Neutrophil % : x  Auto Lymphocyte % : x  Auto Monocyte % : x  Auto Eosinophil % : x  Auto Basophil % : x    11-01    136  |  104  |  36<H>  ----------------------------<  131<H>  3.8   |  24  |  1.07    Ca    8.4      01 Nov 2023 05:30  Phos  3.7     11-01  Mg     2.1     11-01    TPro  7.5  /  Alb  2.0<L>  /  TBili  5.3<H>  /  DBili  3.7<H>  /  AST  106<H>  /  ALT  84<H>  /  AlkPhos  135<H>  11-01          Urinalysis Basic - ( 01 Nov 2023 05:30 )    Color: x / Appearance: x / SG: x / pH: x  Gluc: 131 mg/dL / Ketone: x  / Bili: x / Urobili: x   Blood: x / Protein: x / Nitrite: x   Leuk Esterase: x / RBC: x / WBC x   Sq Epi: x / Non Sq Epi: x / Bacteria: x              RADIOLOGY & ADDITIONAL STUDIES (The following images were personally reviewed):          A/p:78M w PMH Crohn's, AFib/Flutter s/p DCCVs on amiodarone, remote ileocectomy and open appendectomy. Admitted (6/23) for SBO vs Crohns flare, s/p NGT decompression and s/p lap converted to open TRE, SBR x 3, left in discontinuity with abthera vac on (6/27), RTOR for ileocolic resection, small bowel anastomosis, and abdominal wall closure on (6/28), c/b fluid collection s/p IR aspiration of perihepatic fluid on (7/3), c/b wound dehiscence s/p RTOR exlap, washout, ileocolic resection with end ileostomy, blow hole colostomy, red rubber from ileostomy to small bowel anastomosis; vicryl bridging mesh on (7/5) transferred to SICU postoperatively for hemodynamic monitoring, with hospital course complicated by periods of severe ELVI and hyponatremia, which resolved but stepped back up for to SICU on (9/10) for acute AMS, intermittent hypoglycemia, AFib with RVR. Percutaneous Cholecystostomy placed on (9/11) for enlarged GB, PCT capped 10/5 and uncapped 10/25 for hyperbilirubinemia.     NEURO: AMS (resolved), EEG neg. Hx of depression - Effexor resumed. Cont Melatonin prn Insomnia, Cont Zofran. Lorazepam prn. Generalized fatigue and sweats while ambulating-- RVP/Covid neg, orthostatics BP wnl.   HENT: Cepacol  CV: Hx Afib/flutter: s/p DCCV, Off amiodarone given transaminitis; Continue Metoprolol 5q6. Hx HLD: Lipitor Held given high LFTs. TTE  (7/18) - PASP 64mmHg, EF 65-70%,  Hx HTN -  holding Norvasc 10qd. Holding Losartan. Cont Lopressor 5 q6. Significant drainge from fistula, bolus as needed to keep I&O even.   PULM: no resp distress on room air.   GI/FEN: High protein diet w bread. Cont Ensure max x1/day. Cont TPN (2.3 L/day volume) for high ostomy and ECF output: Cont Octreotide in TPN, Imodium, Lomotil, Tincture of Opium, Cholestyramine 10/18. Transaminitis of unknown origin. s/p Perc Choley on 9/11 - tube uncapped 10/25 for worsening hyperbilirubinemia. Hepatitis screen in june negative. Will re-check today.  MRCP 10/30: Diffuse mild pancreatic ductal dilatation otherwise normal. GI following, considering Gattex. Will consult hepatobilliary MD today for raised LFT's.     : Voids.  Cr Range 1.1-1.3  ENDO: No need to check ISS. Hx hypothyroid: IV Synthroid, TSH wnl on 10/23  ID: Currently off abx. Cont Nystatin powder // PREVIOUSLY had C. tertium, Lactobacillis from his IR cx 7/3, and candida albicans, lactobacillus, vanc sensitive E faecium, vanc resistant E gallinarum, vanc resistant E casseliflavus, lactobacillus from his OR cx 7/5. Completed course of abx with Imipenem (9/10-9/15). Imipenem (8/26--) imipenem (6/30-7/12, 7/23-7/30), Dapto (6/30-7/5 and 7/23-7/24). Empiric dapto (8/23-24) and cefepime (8/23-24).   PPX: SCDs, SQH  LINES: PIVs, LUE PICC (9/1- )  WOUNDS/DRAINS: Abdominal wound and fistula with wound manager in place dressing change T & F. RLQ Ostomy. JOYCE. IR PCT.   PT: Ambulate as tolerated   DISPO: SICU due to complicated hospital course. but will do vitals q4hrs (hold overnight), and CBC/BMP QD in setting of high fistula output. Interval Events:  No events overnight.   Patient seen and examined at bedside. C/o Feeling general malaise and sweating episodes when walking.       Allergies    penicillin (Angioedema)    Intolerances        Vital Signs Last 24 Hrs  T(C): 36.5 (01 Nov 2023 05:02), Max: 36.9 (31 Oct 2023 09:16)  T(F): 97.7 (01 Nov 2023 05:02), Max: 98.5 (31 Oct 2023 22:26)  HR: 92 (01 Nov 2023 05:00) (89 - 92)  BP: 120/56 (01 Nov 2023 05:00) (107/56 - 131/61)  BP(mean): 79 (01 Nov 2023 05:00) (77 - 102)  RR: 16 (01 Nov 2023 05:00) (14 - 32)  SpO2: 98% (01 Nov 2023 05:00) (95% - 98%)    Parameters below as of 01 Nov 2023 05:00  Patient On (Oxygen Delivery Method): room air        10-31 @ 07:01 - 11-01 @ 07:00  --------------------------------------------------------  IN: 3319 mL / OUT: 2466 mL / NET: 853 mL      10-31 @ 07:01 - 11-01 @ 07:00  --------------------------------------------------------  IN: 3319 mL / OUT: 2466 mL / NET: 853 mL        Physical Exam:     Gen: No acute events   Neuro: A&OX3 No deficits  HENT: Dry mucus membranes, scleral icterus   CV: Regular rate and rhythm, no murmurs or gallops, no peripheral edema   Pulm: Lung sounds clear bilaterally   ABD: Soft, ND, NT, no rebound tenderness, no guarding, wound manager in place without leakage--brown output.   Ext: Warm, well-perfused   Vasc: +2 radial/pedal pulses   Skin: Jaundice; no rashes noted  MSK: No joint swelling  Psych: No signs of anxiety or depression      LABS:      CBC Full  -  ( 31 Oct 2023 05:30 )  WBC Count : 8.01 K/uL  RBC Count : 2.34 M/uL  Hemoglobin : 7.5 g/dL  Hematocrit : 23.5 %  Platelet Count - Automated : 189 K/uL  Mean Cell Volume : 100.4 fl  Mean Cell Hemoglobin : 32.1 pg  Mean Cell Hemoglobin Concentration : 31.9 gm/dL  Auto Neutrophil # : x  Auto Lymphocyte # : x  Auto Monocyte # : x  Auto Eosinophil # : x  Auto Basophil # : x  Auto Neutrophil % : x  Auto Lymphocyte % : x  Auto Monocyte % : x  Auto Eosinophil % : x  Auto Basophil % : x    11-01    136  |  104  |  36<H>  ----------------------------<  131<H>  3.8   |  24  |  1.07    Ca    8.4      01 Nov 2023 05:30  Phos  3.7     11-01  Mg     2.1     11-01    TPro  7.5  /  Alb  2.0<L>  /  TBili  5.3<H>  /  DBili  3.7<H>  /  AST  106<H>  /  ALT  84<H>  /  AlkPhos  135<H>  11-01          Urinalysis Basic - ( 01 Nov 2023 05:30 )    Color: x / Appearance: x / SG: x / pH: x  Gluc: 131 mg/dL / Ketone: x  / Bili: x / Urobili: x   Blood: x / Protein: x / Nitrite: x   Leuk Esterase: x / RBC: x / WBC x   Sq Epi: x / Non Sq Epi: x / Bacteria: x              RADIOLOGY & ADDITIONAL STUDIES (The following images were personally reviewed):          A/p:78M w PMH Crohn's, AFib/Flutter s/p DCCVs on amiodarone, remote ileocectomy and open appendectomy. Admitted (6/23) for SBO vs Crohns flare, s/p NGT decompression and s/p lap converted to open TRE, SBR x 3, left in discontinuity with abthera vac on (6/27), RTOR for ileocolic resection, small bowel anastomosis, and abdominal wall closure on (6/28), c/b fluid collection s/p IR aspiration of perihepatic fluid on (7/3), c/b wound dehiscence s/p RTOR exlap, washout, ileocolic resection with end ileostomy, blow hole colostomy, red rubber from ileostomy to small bowel anastomosis; vicryl bridging mesh on (7/5) transferred to SICU postoperatively for hemodynamic monitoring, with hospital course complicated by periods of severe ELVI and hyponatremia, which resolved but stepped back up for to SICU on (9/10) for acute AMS, intermittent hypoglycemia, AFib with RVR. Percutaneous Cholecystostomy placed on (9/11) for enlarged GB, PCT capped 10/5 and uncapped 10/25 for hyperbilirubinemia.     NEURO: AMS (resolved), EEG neg. Hx of depression - Effexor resumed. Cont Melatonin prn Insomnia, Cont Zofran. Lorazepam prn. Generalized fatigue and sweats while ambulating-- RVP/Covid neg, orthostatics BP wnl.   HENT: Cepacol  CV: Hx Afib/flutter: s/p DCCV, Off amiodarone given transaminitis; Continue Metoprolol 5q6. Hx HLD: Lipitor Held given high LFTs. TTE  (7/18) - PASP 64mmHg, EF 65-70%,  Hx HTN -  holding Norvasc 10qd. Holding Losartan. Cont Lopressor 5 q6. Significant drainge from fistula, bolus as needed to keep I&O even. Bolus this am for clinically dry exam.   PULM: no resp distress on room air.   GI/FEN: High protein diet w bread. Cont Ensure max x1/day. Cont TPN (2.3 L/day volume) for high ostomy and ECF output: Cont Octreotide in TPN, Imodium, Lomotil, Tincture of Opium, Cholestyramine 10/18. Transaminitis of unknown origin. s/p Perc Choley on 9/11 - tube uncapped 10/25 for worsening hyperbilirubinemia. Hepatitis screen in june negative. Will re-check today.  MRCP 10/30: Diffuse mild pancreatic ductal dilatation otherwise normal. GI following, considering Gattex. Will consult hepatobilliary MD today for raised LFT's.   Will get CT abd and pelvis to evaluate for leak vs collection.   : Voids.  Cr Range 1.1-1.3  ENDO: No need to check ISS. Hx hypothyroid: IV Synthroid, TSH wnl on 10/23 repeat Today   ID: Currently off abx. Cont Nystatin powder // PREVIOUSLY had C. tertium, Lactobacillis from his IR cx 7/3, and candida albicans, lactobacillus, vanc sensitive E faecium, vanc resistant E gallinarum, vanc resistant E casseliflavus, lactobacillus from his OR cx 7/5. Completed course of abx with Imipenem (9/10-9/15). Imipenem (8/26--) imipenem (6/30-7/12, 7/23-7/30), Dapto (6/30-7/5 and 7/23-7/24). Empiric dapto (8/23-24) and cefepime (8/23-24).   PPX: SCDs, SQH Duplex today to r/o RUext DVT  LINES: PIVs, LUE PICC (9/1- ) Will place new PICC line today and DC previous PICC.   WOUNDS/DRAINS: Abdominal wound and fistula with wound manager in place dressing change T & F. RLQ Ostomy. JOYCE. IR PCT.   PT: Ambulate as tolerated   DISPO: SICU due to complicated hospital course. but will do vitals q4hrs (hold overnight), and CBC/BMP QD in setting of high fistula output.

## 2023-11-01 NOTE — PROGRESS NOTE ADULT - ASSESSMENT
78 M w/ Crohn's, AFib/Flutter s/p DCCVs on amiodarone, remote ileocectomy and open appendectomy. Admitted (6/23) for SBO vs Crohns flare, s/p NGT decompression and s/p lap RTE converted to open TRE, SBR x 3, left in discontinuity with abthera vac on (6/27), RTOR for ileocolic resection, small bowel anastomosis, and abdominal wall closure on (6/28), c/b fluid collection s/p IR aspiration of perihepatic fluid on (7/3), c/b wound dehiscence s/p RTOR exlap, washout, ileocolic resection with end ileostomy, blow hole colostomy, red rubber from ileostomy to small bowel anastomosis; vicryl bridging mesh on (7/5) transferred to SICU postoperatively for hemodynamic monitoring, with hospital course complicated by periods of AMS most recently on 9/1 and associated with oliguria, severe ELVI and hyponatremia, which resolved but stepped back up for to SICU on 9/10 for acute AMS, intermittent hypoglycemia, AFib with RVR. 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 2.7 cm duodenal diverticulum. Blood cultures 10/30 No growth so far.     Plan:  - Follow blood and bile cultures sent 10/30  - Trend LFTs, keep perc cony uncapped  - C/w TPN and start PO regular diet with liquid protein supplements   - Continue to monitor strict I&Os, bolus as needed for repletion  - Pending as per GI Gattex authorization, however, do not start Gattex without multidisciplinary discussion  - Wound manager changes as per wound care nursing staff (Tuesdays and Fridays)  - Pending hepatology and surgical oncology consult Dr Pena regarding uptrending bilirubin   Team 5 will continue to follow, please call with any questions or concerns

## 2023-11-01 NOTE — PROGRESS NOTE ADULT - SUBJECTIVE AND OBJECTIVE BOX
78M with history of Crohn's, AFib/Flutter s/p DCCVs on amiodarone, remote ileocectomy and open appendectomy. Admitted (6/23) for SBO vs Crohns flare, s/p NGT decompression and s/p lap TRE converted to open TRE, SBR x 3, left in discontinuity with abthera vac on (6/27), RTOR for ileocolic resection, small bowel anastomosis, and abdominal wall closure on (6/28), c/b fluid collection s/p IR aspiration of perihepatic fluid on (7/3), c/b wound dehiscence s/p RTOR exlap, washout, ileocolic resection with end ileostomy, blow hole colostomy, red rubber from ileostomy to small bowel anastomosis; vicryl bridging mesh on (7/5) transferred to SICU postoperatively for hemodynamic monitoring, with hospital course complicated by periods of AMS most recently on 9/1 and associated with oliguria, severe ELVI and hyponatremia, which resolved but stepped back up for to SICU on 9/10 for acute AMS, intermittent hypoglycemia, AFib with RVR. Percutaneous Cholecystostomy placed on 9/11 for enlarged GB, PCT capped 10/5, and connected to drainage bag 10/25.    Percutaneous cholecystostomy tube with 560 cc bilious output past 24 hr, previously 650 cc.  -Strict I/O, monitor output

## 2023-11-01 NOTE — CONSULT NOTE ADULT - SUBJECTIVE AND OBJECTIVE BOX
Initial Hepatology Consult Note:     78M with PMH of Crohn's, AFib/Flutter (s/p DCCVs, previously on amiodarone but now off of it for a few months), remote ileocectomy and open appendectomy (nearly 40 years ago). Patient was admitted this admission (06/23) for SBO vs. Crohn's flare, s/p NGT decompression and s/p lap TRE converted to open TRE, SBR x 3, left in discontinuity with abthera vac on (6/27), RTOR for ileocolic resection, small bowel anastomosis, and abdominal wall closure on (6/28), c/b fluid collection s/p IR aspiration of perihepatic fluid on (7/3), c/b wound dehiscence s/p RTOR exlap, washout, ileocolic resection with end ileostomy, blow hole colostomy, red rubber from ileostomy to small bowel anastomosis; vicryl bridging mesh on (7/5) transferred to SICU postoperatively for hemodynamic monitoring. Hospital course complicated by periods of AMS (most recently on 9/1 and associated with oliguria, severe ELVI and hyponatremia), which resolved. Stepped back up for to SICU on 9/10 for acute AMS, intermittent hypoglycemia, AFib with RVR. Percutaneous cholecystostomy placed on 9/11 for enlarged GB, PCT capped on 10/5. 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 Patient has been followed by Dr. Yuan from GI in the hospital. Hepatology team consulted for elevated liver enzymes and elevated bilirubin.    Patient seen and examined at bedside. Reports that overall he feels fine. No prior history of liver disease or GI malignancy. Takes amiodarone, metoprolol, venlafaxine, and allopurinol at home. Feels that overall he is doing better but frustrated by the episodes he has of sweating, that make him feel very weak and unable to ambulate, which he is trying to do to grow stronger. Says these episodes occasionally accompanied by nausea/vomiting. Has never experienced them prior to this hospitalization.     PMHx: HTN, Atrial fibrillation s/p multiple DCCV, Crohn's disease, HLD, Hypothyroidism  PSHx: Ileocecectomy 30 years ago, open appy, H/O knee surgery, History of cataract surgery  Family Hx: Denies family hx of IBS, Crohn's, UC, or colon cancer.  Last Cscope: 6 months ago wnl  Last EGD: 6 months ago wnl  All: penicillin (Angioedema)    Meds: allopurinol 300 mg oral tablet: 1 tab(s) orally once a day  amiodarone 200 mg oral tablet: 1 orally once a day  metoprolol tartrate 50 mg oral tablet: 1 orally 2 times a day  rosuvastatin 5 mg oral tablet: 1 tab(s) orally once a day  Synthroid 50 mcg (0.05 mg) oral tablet: 1 tab(s) orally once a day  venlafaxine 150 mg oral tablet, extended release: 1 tab(s) orally 2 times a day      Vitals past 24h:  T(F): 97.6 (13:29), Max: 97.6 (13:29)  HR: 98 (13:29) (98 - 98)  BP: 194/72 (13:29) (194/72 - 194/72)  RR: 20 (13:29) (20 - 20)  SpO2: 98% (13:29) (98% - 98%)   (23 Jun 2023 16:52)    Allergies    penicillin (Angioedema)    Intolerances      Home Medications:  allopurinol 300 mg oral tablet: 1 tab(s) orally once a day (23 Jun 2023 16:39)  amiodarone 200 mg oral tablet: 1 orally once a day (23 Jun 2023 16:39)  metoprolol tartrate 50 mg oral tablet: 1 orally 2 times a day (23 Jun 2023 16:39)  rosuvastatin 5 mg oral tablet: 1 tab(s) orally once a day (23 Jun 2023 16:39)  venlafaxine 150 mg oral tablet, extended release: 1 tab(s) orally 2 times a day (23 Jun 2023 16:39)    MEDICATIONS:  MEDICATIONS  (STANDING):  chlorhexidine 2% Cloths 1 Application(s) Topical <User Schedule>  cholestyramine Powder (Sugar-Free) 4 Gram(s) Oral daily  dextrose 5% + sodium chloride 0.9%. 1000 milliLiter(s) (135 mL/Hr) IV Continuous <Continuous>  diphenoxylate/atropine 2 Tablet(s) Oral every 6 hours  glucagon  Injectable 1 milliGRAM(s) IntraMuscular once  heparin   Injectable 5000 Unit(s) SubCutaneous every 8 hours  influenza  Vaccine (HIGH DOSE) 0.7 milliLiter(s) IntraMuscular once  levothyroxine Injectable 40 MICROGram(s) IV Push at bedtime  loperamide 4 milliGRAM(s) Oral every 6 hours  metoprolol tartrate Injectable 5 milliGRAM(s) IV Push every 6 hours  nystatin Powder 1 Application(s) Topical three times a day  ondansetron   Disintegrating Tablet 4 milliGRAM(s) Oral once  opium Tincture 6 milliGRAM(s) Oral every 6 hours  Parenteral Nutrition - Adult 1 Each TPN Continuous <Continuous>  venlafaxine XR. 150 milliGRAM(s) Oral two times a day    MEDICATIONS  (PRN):  LORazepam   Injectable 0.5 milliGRAM(s) IV Push at bedtime PRN Anxiety  ondansetron Injectable 4 milliGRAM(s) IV Push every 8 hours PRN Nausea and/or Vomiting    PAST MEDICAL & SURGICAL HISTORY:  Essential hypertension  Hypertension      Atrial fibrillation  s/p cardioversion 2010 and 2014  Pt. reports 4 DCCV  Now on Amiodarone 200mg PO bid and Eliquis 5mg PO bid  Last DCCV 4 yrs ago at [facility]      Crohn's disease  s/p partial resection of ileum      Hyperlipidemia      Hypothyroidism      History of depression  On Venlafaxine ER 150mg PO bid      Junctional rhythm      H/O knee surgery      History of cataract surgery        FAMILY HISTORY:    SOCIAL HISTORY:  [employer]: [ ] Current, [ ] Former, [ ] Never; Pack Years:  Alcohol:  Illicit Drugs:      Vital Signs Last 24 Hrs  T(C): 36.5 (01 Nov 2023 05:02), Max: 36.9 (31 Oct 2023 22:26)  T(F): 97.7 (01 Nov 2023 05:02), Max: 98.5 (31 Oct 2023 22:26)  HR: 90 (01 Nov 2023 12:00) (89 - 92)  BP: 111/55 (01 Nov 2023 12:00) (110/52 - 131/61)  BP(mean): 79 (01 Nov 2023 12:00) (75 - 102)  RR: 15 (01 Nov 2023 12:00) (14 - 35)  SpO2: 96% (01 Nov 2023 12:00) (95% - 98%)    Parameters below as of 01 Nov 2023 12:00  Patient On (Oxygen Delivery Method): room air        10-31 @ 07:01  -  11-01 @ 07:00  --------------------------------------------------------  IN: 3319 mL / OUT: 2466 mL / NET: 853 mL    11-01 @ 07:01  -  11-01 @ 16:01  --------------------------------------------------------  IN: 2170 mL / OUT: 890 mL / NET: 1280 mL      PHYSICAL EXAM:  General: No acute distress  Lungs: Normal respiratory effort and no intercostal retractions  Cardiovascular: RRR  Abdomen: Soft, non-tender, non-distended  Neurological: Alert and oriented x3  Skin: Warm and dry. No obvious rash       LABS:                        7.5    8.01  )-----------( 189      ( 31 Oct 2023 05:30 )             23.5     11-01    136  |  104  |  36<H>  ----------------------------<  131<H>  3.8   |  24  |  1.07    Ca    8.4      01 Nov 2023 05:30  Phos  3.7     11-01  Mg     2.1     11-01    TPro  7.5  /  Alb  2.0<L>  /  TBili  5.3<H>  /  DBili  3.7<H>  /  AST  106<H>  /  ALT  84<H>  /  AlkPhos  135<H>  11-01            Culture - Blood (collected 30 Oct 2023 21:53)  Source: .Blood Blood  Preliminary Report (31 Oct 2023 23:00):    No growth at 1 day.    Culture - Blood (collected 30 Oct 2023 21:53)  Source: .Blood Blood  Preliminary Report (31 Oct 2023 23:00):    No growth at 1 day.    Culture - Acid Fast - Body Fluid w/Smear (collected 30 Oct 2023 21:24)  Source: Bile Bile Fluid  Preliminary Report (01 Nov 2023 15:08):    Culture is being performed.      RADIOLOGY & ADDITIONAL STUDIES:     Reviewed

## 2023-11-01 NOTE — PROGRESS NOTE ADULT - SUBJECTIVE AND OBJECTIVE BOX
ON:Total Net positive +853.     SUBJECTIVE: Pt seen and examined at bedside this am by surgery team. Tolerating PO.   MEDICATIONS  (STANDING):  chlorhexidine 2% Cloths 1 Application(s) Topical <User Schedule>  cholestyramine Powder (Sugar-Free) 4 Gram(s) Oral daily  dextrose 5% + sodium chloride 0.9%. 1000 milliLiter(s) (135 mL/Hr) IV Continuous <Continuous>  diphenoxylate/atropine 2 Tablet(s) Oral every 6 hours  glucagon  Injectable 1 milliGRAM(s) IntraMuscular once  heparin   Injectable 5000 Unit(s) SubCutaneous every 8 hours  influenza  Vaccine (HIGH DOSE) 0.7 milliLiter(s) IntraMuscular once  levothyroxine Injectable 40 MICROGram(s) IV Push at bedtime  loperamide 4 milliGRAM(s) Oral every 6 hours  metoprolol tartrate Injectable 5 milliGRAM(s) IV Push every 6 hours  nystatin Powder 1 Application(s) Topical three times a day  ondansetron   Disintegrating Tablet 4 milliGRAM(s) Oral once  opium Tincture 6 milliGRAM(s) Oral every 6 hours  Parenteral Nutrition - Adult 1 Each TPN Continuous <Continuous>  venlafaxine XR. 150 milliGRAM(s) Oral two times a day    MEDICATIONS  (PRN):  LORazepam   Injectable 0.5 milliGRAM(s) IV Push at bedtime PRN Anxiety  ondansetron Injectable 4 milliGRAM(s) IV Push every 8 hours PRN Nausea and/or Vomiting      Vital Signs Last 24 Hrs  T(C): 36.5 (01 Nov 2023 05:02), Max: 36.9 (31 Oct 2023 22:26)  T(F): 97.7 (01 Nov 2023 05:02), Max: 98.5 (31 Oct 2023 22:26)  HR: 92 (01 Nov 2023 09:00) (89 - 92)  BP: 110/52 (01 Nov 2023 09:00) (107/56 - 131/61)  BP(mean): 75 (01 Nov 2023 09:00) (75 - 102)  RR: 35 (01 Nov 2023 09:00) (14 - 35)  SpO2: 97% (01 Nov 2023 09:00) (95% - 98%)    Parameters below as of 01 Nov 2023 09:00  Patient On (Oxygen Delivery Method): room air        Physical Exam:  General: NAD, resting comfortably in bed  Pulmonary: Nonlabored breathing, no respiratory distress  Cardiovascular: NSR  Abdominal: Soft, NT, ND abdomen. Wound manager in place draining bilious fluid. Wound continues to heal appropriately, with granulation tissue overlying wound. LUQ JOYCE in place. R side ileostomy with red rubber in place. Perc cony bilious.   Extremities: WWP, normal strength  Neuro: A/O x 3, CNs II-XII grossly intact, no focal deficits        I&O's Detail    31 Oct 2023 07:01  -  01 Nov 2023 07:00  --------------------------------------------------------  IN:    Fat Emulsion (Fish Oil &amp; Plant Based) 20% Infusion: 555 mL    Oral Fluid: 50 mL    TPN (Total Parenteral Nutrition): 2714 mL  Total IN: 3319 mL    OUT:    Bulb (mL): 6 mL    Drain (mL): 560 mL    Drain (mL): 540 mL    Drain (mL): 10 mL    Voided (mL): 1350 mL  Total OUT: 2466 mL    Total NET: 853 mL      01 Nov 2023 07:01  -  01 Nov 2023 12:26  --------------------------------------------------------  IN:    IV PiggyBack: 100 mL    IV PiggyBack: 1000 mL    TPN (Total Parenteral Nutrition): 395 mL  Total IN: 1495 mL    OUT:    Drain (mL): 10 mL    Drain (mL): 80 mL    Voided (mL): 400 mL  Total OUT: 490 mL    Total NET: 1005 mL          LABS:                        7.5    8.01  )-----------( 189      ( 31 Oct 2023 05:30 )             23.5     11-01    136  |  104  |  36<H>  ----------------------------<  131<H>  3.8   |  24  |  1.07    Ca    8.4      01 Nov 2023 05:30  Phos  3.7     11-01  Mg     2.1     11-01    TPro  7.5  /  Alb  2.0<L>  /  TBili  5.3<H>  /  DBili  3.7<H>  /  AST  106<H>  /  ALT  84<H>  /  AlkPhos  135<H>  11-01      Urinalysis Basic - ( 01 Nov 2023 05:30 )    Color: x / Appearance: x / SG: x / pH: x  Gluc: 131 mg/dL / Ketone: x  / Bili: x / Urobili: x   Blood: x / Protein: x / Nitrite: x   Leuk Esterase: x / RBC: x / WBC x   Sq Epi: x / Non Sq Epi: x / Bacteria: x

## 2023-11-01 NOTE — CONSULT NOTE ADULT - ASSESSMENT
78M with PMH of Crohn's, AFib/Flutter (s/p DCCVs, previously on amiodarone but now off of it for a few months), remote ileocectomy and open appendectomy (nearly 40 years ago). Patient was admitted this admission (06/23) for SBO vs. Crohn's flare, s/p NGT decompression and s/p lap TRE converted to open TRE, SBR x 3, left in discontinuity with abthera vac on (6/27), RTOR for ileocolic resection, small bowel anastomosis, and abdominal wall closure on (6/28), c/b fluid collection s/p IR aspiration of perihepatic fluid on (7/3), c/b wound dehiscence s/p RTOR exlap, washout, ileocolic resection with end ileostomy, blow hole colostomy, red rubber from ileostomy to small bowel anastomosis; vicryl bridging mesh on (7/5) transferred to SICU postoperatively for hemodynamic monitoring. Hospital course complicated by periods of AMS (most recently on 9/1 and associated with oliguria, severe ELVI and hyponatremia), which resolved. Stepped back up for to SICU on 9/10 for acute AMS, intermittent hypoglycemia, AFib with RVR. Percutaneous cholecystostomy placed on 9/11 for enlarged GB, PCT capped on 10/5. 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 Patient has been followed by Dr. Yuan from GI in the hospital. Hepatology team consulted for elevated liver enzymes and elevated bilirubin.    Chart reviewed and patient's liver enzymes began increasing mildly around early to mid September, and have been gradually increasing ever since then proportionally.   09/05/23: AST/AT 44/31  09/18/23: AST/ALT 71/46  10/15/23: AST/ALT 80/90   11/01/23: AST//85    Throughout this admission has received multiple abx, which are as follows:      78M with PMH of Crohn's, AFib/Flutter (s/p DCCVs, previously on amiodarone but now off of it for a few months), remote ileocectomy and open appendectomy (nearly 40 years ago). Patient was admitted this admission (06/23) for SBO vs. Crohn's flare, s/p NGT decompression and s/p lap TRE converted to open TRE, SBR x 3, left in discontinuity with abthera vac on (6/27), RTOR for ileocolic resection, small bowel anastomosis, and abdominal wall closure on (6/28), c/b fluid collection s/p IR aspiration of perihepatic fluid on (7/3), c/b wound dehiscence s/p RTOR exlap, washout, ileocolic resection with end ileostomy, blow hole colostomy, red rubber from ileostomy to small bowel anastomosis; vicryl bridging mesh on (7/5) transferred to SICU postoperatively for hemodynamic monitoring. Hospital course complicated by periods of AMS (most recently on 9/1 and associated with oliguria, severe ELVI and hyponatremia), which resolved. Stepped back up for to SICU on 9/10 for acute AMS, intermittent hypoglycemia, AFib with RVR. Percutaneous cholecystostomy placed on 9/11 for enlarged GB, PCT capped on 10/5. 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 Patient has been followed by Dr. Yuan from GI in the hospital. Hepatology team consulted for elevated liver enzymes and elevated bilirubin.    Chart reviewed and patient's liver enzymes began increasing mildly around early to mid September, and have been gradually increasing ever since then fairly proportionally.   - 09/05/23: AST/AT 44/31  - 09/18/23: AST/ALT 71/46  - 10/15/23: AST/ALT 80/90   - 11/01/23: AST//85    Difficult to pinpoint the exact cause of patient's elevated liver enzymes, but most likely elevated 2/2 cholestasis of illness vs. DILI (has received amiodarone, ceftriaxone, hydralazine during this admission) vs. 2/2 TPN vs. 2/2 possible irritation of the liver by perc cony drain catheter. CT abdomen/pelvis does not reveal any obstructive process.     CT abdomen/pelvis (11/01/23):   LIVER: Small cyst peripherally in the posterior segment of the right lobe. Tiny hypodensities in the right lobe at the dome too small to characterize and unchanged  BILE DUCTS: Normal caliber.  GALLBLADDER: Decompressed by a cholecystostomy tube  PANCREAS: Cystic foci are again seen. Mild dilatation of the pancreatic duct is also again noted  BOWEL: No bowel obstruction. Mild colonic diverticulosis without diverticulitis.  PERITONEUM: 2 abdominal drainage catheters are again appreciated with the tips in the mid to lower abdomen.  ABDOMINAL WALL: Midline anterior abdominal wall defects again seen. Ostomy again noted. Fat-containing left inguinal hernia.    Recommendations:   - start ursodiol 300 mg TID   - repeat acute hepatitis panel and please also send HAV IgG, HBV surface Ab, and HBV core total Ab  - continue to trend liver enzymes with daily CMP   - avoid hepatotoxic medications   - if cholestasis of illness, anticipate that liver enzymes will improve as patient improves clinically overall    Case discussed with Dr. Carrillo. Hepatology Team will continue to follow.     Esme Barber D.O.   Gastroenterology Fellow  Weekday 7am-5pm Pager: [phone]  Weeknights/Weekend/Holiday Coverage: Please call the  for contact info

## 2023-11-01 NOTE — PROGRESS NOTE ADULT - NS ATTEND AMEND GEN_ALL_CORE FT
Crohn's, AF, SBR, ileocolic resection with end ileostomy  Now with episodes of chills and sweats  physical as above  TFTs OK  to check cortisol  CT of abdomen shows no new collections  cultures of blood and bile sterile  PICC line changed  brachial DVT  will hold off on AC for now given risk of bleeding  LFT increases likely from TPN, hepatology to consult

## 2023-11-02 NOTE — PROGRESS NOTE ADULT - SUBJECTIVE AND OBJECTIVE BOX
78M with history of Crohn's, AFib/Flutter s/p DCCVs on amiodarone, remote ileocectomy and open appendectomy. Admitted (6/23) for SBO vs Crohns flare, s/p NGT decompression and s/p lap TRE converted to open TRE, SBR x 3, left in discontinuity with abthera vac on (6/27), RTOR for ileocolic resection, small bowel anastomosis, and abdominal wall closure on (6/28), c/b fluid collection s/p IR aspiration of perihepatic fluid on (7/3), c/b wound dehiscence s/p RTOR exlap, washout, ileocolic resection with end ileostomy, blow hole colostomy, red rubber from ileostomy to small bowel anastomosis; vicryl bridging mesh on (7/5) transferred to SICU postoperatively for hemodynamic monitoring, with hospital course complicated by periods of AMS most recently on 9/1 and associated with oliguria, severe ELVI and hyponatremia, which resolved but stepped back up for to SICU on 9/10 for acute AMS, intermittent hypoglycemia, AFib with RVR. Percutaneous Cholecystostomy placed on 9/11 for enlarged GB, PCT capped 10/5, and connected to drainage bag 10/25.    Percutaneous cholecystostomy tube with 715 cc bilious output past 24 hr, previously 560 cc.  -Strict I/O, monitor output

## 2023-11-02 NOTE — PROGRESS NOTE ADULT - ASSESSMENT
78M with PMH of Crohn's, AFib/Flutter (s/p DCCVs, previously on amiodarone but now off of it for a few months), remote ileocectomy and open appendectomy (nearly 40 years ago). Patient was admitted this admission (06/23) for SBO vs. Crohn's flare, s/p NGT decompression and s/p lap TRE converted to open TRE, SBR x 3, left in discontinuity with abthera vac on (6/27), RTOR for ileocolic resection, small bowel anastomosis, and abdominal wall closure on (6/28), c/b fluid collection s/p IR aspiration of perihepatic fluid on (7/3), c/b wound dehiscence s/p RTOR exlap, washout, ileocolic resection with end ileostomy, blow hole colostomy, red rubber from ileostomy to small bowel anastomosis; vicryl bridging mesh on (7/5) transferred to SICU postoperatively for hemodynamic monitoring. Hospital course complicated by periods of AMS (most recently on 9/1 and associated with oliguria, severe ELVI and hyponatremia), which resolved. Stepped back up for to SICU on 9/10 for acute AMS, intermittent hypoglycemia, AFib with RVR. Percutaneous cholecystostomy placed on 9/11 for enlarged GB, PCT capped on 10/5. 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 Patient has been followed by Dr. Yuan from GI in the hospital. Hepatology team consulted for elevated liver enzymes and elevated bilirubin.    Chart reviewed and patient's liver enzymes began increasing mildly around early to mid September, and have been gradually increasing ever since then fairly proportionally.   - 09/05/23: AST/AT 44/31  - 09/18/23: AST/ALT 71/46  - 10/15/23: AST/ALT 80/90   - 11/01/23: AST//85    Difficult to pinpoint the exact cause of patient's elevated liver enzymes, but most likely elevated 2/2 cholestasis of illness vs. DILI (has received amiodarone, ceftriaxone, hydralazine during this admission) vs. 2/2 TPN vs. 2/2 possible irritation of the liver by perc cony drain catheter. CT abdomen/pelvis does not reveal any obstructive process.     CT abdomen/pelvis (11/01/23):   LIVER: Small cyst peripherally in the posterior segment of the right lobe. Tiny hypodensities in the right lobe at the dome too small to characterize and unchanged  BILE DUCTS: Normal caliber.  GALLBLADDER: Decompressed by a cholecystostomy tube  PANCREAS: Cystic foci are again seen. Mild dilatation of the pancreatic duct is also again noted  BOWEL: No bowel obstruction. Mild colonic diverticulosis without diverticulitis.  PERITONEUM: 2 abdominal drainage catheters are again appreciated with the tips in the mid to lower abdomen.  ABDOMINAL WALL: Midline anterior abdominal wall defects again seen. Ostomy again noted. Fat-containing left inguinal hernia.    Liver enzymes today basically unchanged from day prior.     Recommendations:   - send serum fungitell and fungal blood cultures given patient reports rigoring while on TPN   - continue ursodiol 300 mg TID   - f/u acute hepatitis panel and please also send HAV IgG, HBV surface Ab, and HBV core total Ab  - continue to trend liver enzymes with daily CMP   - avoid hepatotoxic medications   - if cholestasis of illness, anticipate that liver enzymes will improve as patient improves clinically overall    Hepatology Team will continue to follow.     Esme Barber D.O.   Gastroenterology Fellow  Weekday 7am-5pm Pager: [phone]  Weeknights/Weekend/Holiday Coverage: Please call the  for contact info   78M with PMH of Crohn's, AFib/Flutter (s/p DCCVs, previously on amiodarone but now off of it for a few months), remote ileocectomy and open appendectomy (nearly 40 years ago). Patient was admitted this admission (06/23) for SBO vs. Crohn's flare, s/p NGT decompression and s/p lap TRE converted to open TRE, SBR x 3, left in discontinuity with abthera vac on (6/27), RTOR for ileocolic resection, small bowel anastomosis, and abdominal wall closure on (6/28), c/b fluid collection s/p IR aspiration of perihepatic fluid on (7/3), c/b wound dehiscence s/p RTOR exlap, washout, ileocolic resection with end ileostomy, blow hole colostomy, red rubber from ileostomy to small bowel anastomosis; vicryl bridging mesh on (7/5) transferred to SICU postoperatively for hemodynamic monitoring. Hospital course complicated by periods of AMS (most recently on 9/1 and associated with oliguria, severe ELVI and hyponatremia), which resolved. Stepped back up for to SICU on 9/10 for acute AMS, intermittent hypoglycemia, AFib with RVR. Percutaneous cholecystostomy placed on 9/11 for enlarged GB, PCT capped on 10/5. 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 Patient has been followed by Dr. Yuan from GI in the hospital. Hepatology team consulted for elevated liver enzymes and elevated bilirubin.    Chart reviewed and patient's liver enzymes began increasing mildly around early to mid September, and have been gradually increasing ever since then fairly proportionally.   - 09/05/23: AST/AT 44/31  - 09/18/23: AST/ALT 71/46  - 10/15/23: AST/ALT 80/90   - 11/01/23: AST//85    Difficult to pinpoint the exact cause of patient's elevated liver enzymes, but most likely elevated 2/2 cholestasis of illness vs. DILI (has received amiodarone, ceftriaxone, hydralazine during this admission) vs. 2/2 TPN vs. 2/2 possible irritation of the liver by perc cony drain catheter. CT abdomen/pelvis does not reveal any obstructive process.     CT abdomen/pelvis (11/01/23):   LIVER: Small cyst peripherally in the posterior segment of the right lobe. Tiny hypodensities in the right lobe at the dome too small to characterize and unchanged  BILE DUCTS: Normal caliber.  GALLBLADDER: Decompressed by a cholecystostomy tube  PANCREAS: Cystic foci are again seen. Mild dilatation of the pancreatic duct is also again noted  BOWEL: No bowel obstruction. Mild colonic diverticulosis without diverticulitis.  PERITONEUM: 2 abdominal drainage catheters are again appreciated with the tips in the mid to lower abdomen.  ABDOMINAL WALL: Midline anterior abdominal wall defects again seen. Ostomy again noted. Fat-containing left inguinal hernia.    Liver enzymes today basically unchanged from day prior.     Recommendations:   - send serum fungitell and fungal blood cultures given patient reports rigoring while on TPN   - continue ursodiol 300 mg TID   - continue to trend liver enzymes with daily CMP   - avoid hepatotoxic medications   - if cholestasis of illness, anticipate that liver enzymes will improve as patient improves clinically overall    We will sign off, but please page if any further questions arise. Please see attending addendum for final recommendations.    Esme Barber D.O.   Gastroenterology Fellow  Weekday 7am-5pm Pager: [phone]  Weeknights/Weekend/Holiday Coverage: Please call the  for contact info

## 2023-11-02 NOTE — PROGRESS NOTE ADULT - NS ATTEND AMEND GEN_ALL_CORE FT
Crohn's, AF s/p SBR with ileocolic resection, end ileostomy, enteroatmospheric fistula, malabsorption, RUE DVT, LUE superficial thrombosis  physical as above  runs of AF but not tachycardic  plan to discuss anticoagulation  continue TPN  check cortisol re fatigue; TFTs OK  continue metoprolol  follow on venlaxafine  tolerating some po  decision making of high complexity

## 2023-11-02 NOTE — PROGRESS NOTE ADULT - SUBJECTIVE AND OBJECTIVE BOX
Interval Events:  As per Hepatology team started on Ursodiol overnight and Hepatitis screen sent.     Patient seen and examined at bedside.      Allergies    penicillin (Angioedema)    Intolerances        Vital Signs Last 24 Hrs  T(C): 36.7 (02 Nov 2023 06:23), Max: 36.7 (02 Nov 2023 06:23)  T(F): 98.1 (02 Nov 2023 06:23), Max: 98.1 (02 Nov 2023 06:23)  HR: 71 (02 Nov 2023 06:05) (71 - 92)  BP: 114/61 (02 Nov 2023 06:05) (98/56 - 128/59)  BP(mean): 79 (02 Nov 2023 06:05) (75 - 88)  RR: 16 (02 Nov 2023 06:05) (15 - 35)  SpO2: 96% (02 Nov 2023 06:05) (95% - 99%)    Parameters below as of 02 Nov 2023 06:05  Patient On (Oxygen Delivery Method): room air        11-01 @ 07:01 - 11-02 @ 07:00  --------------------------------------------------------  IN: 4430 mL / OUT: 3105 mL / NET: 1325 mL      11-01 @ 07:01  -  11-02 @ 07:00  --------------------------------------------------------  IN: 4430 mL / OUT: 3105 mL / NET: 1325 mL        Physical Exam:     Gen: NAD well nourished  Neuro: A&OX3 No deficits  CV:RRR Reg s1s2 noM  Pulm: CTA b/l No w/r/r  Abd: Soft NT ND + BS  Ext: No C/C/E   Vasc: + DP b/l   Skin: no rashes noted  MSK: No joint swelling  Psych: No signs of anxiety or depression      LABS:      CBC Full  -  ( 02 Nov 2023 05:30 )  WBC Count : 6.34 K/uL  RBC Count : 2.29 M/uL  Hemoglobin : 7.3 g/dL  Hematocrit : 23.4 %  Platelet Count - Automated : 173 K/uL  Mean Cell Volume : 102.2 fl  Mean Cell Hemoglobin : 31.9 pg  Mean Cell Hemoglobin Concentration : 31.2 gm/dL  Auto Neutrophil # : x  Auto Lymphocyte # : x  Auto Monocyte # : x  Auto Eosinophil # : x  Auto Basophil # : x  Auto Neutrophil % : x  Auto Lymphocyte % : x  Auto Monocyte % : x  Auto Eosinophil % : x  Auto Basophil % : x    11-02    134<L>  |  104  |  28<H>  ----------------------------<  87  4.1   |  22  |  1.10    Ca    8.0<L>      02 Nov 2023 05:30  Phos  3.9     11-02  Mg     1.8     11-02    TPro  7.5  /  Alb  2.0<L>  /  TBili  5.2<H>  /  DBili  3.6<H>  /  AST  106<H>  /  ALT  85<H>  /  AlkPhos  128<H>  11-02    PT/INR - ( 02 Nov 2023 05:30 )   PT: 14.3 sec;   INR: 1.26          PTT - ( 02 Nov 2023 05:30 )  PTT:34.6 sec      Urinalysis Basic - ( 02 Nov 2023 05:30 )    Color: x / Appearance: x / SG: x / pH: x  Gluc: 87 mg/dL / Ketone: x  / Bili: x / Urobili: x   Blood: x / Protein: x / Nitrite: x   Leuk Esterase: x / RBC: x / WBC x   Sq Epi: x / Non Sq Epi: x / Bacteria: x              RADIOLOGY & ADDITIONAL STUDIES (The following images were personally reviewed):          A/p: 78M w PMH Crohn's, AFib/Flutter s/p DCCVs on amiodarone, remote ileocectomy and open appendectomy. Admitted (6/23) for SBO vs Crohns flare, s/p NGT decompression and s/p lap converted to open TRE, SBR x 3, left in discontinuity with abthera vac on (6/27), RTOR for ileocolic resection, small bowel anastomosis, and abdominal wall closure on (6/28), c/b fluid collection s/p IR aspiration of perihepatic fluid on (7/3), c/b wound dehiscence s/p RTOR exlap, washout, ileocolic resection with end ileostomy, blow hole colostomy, red rubber from ileostomy to small bowel anastomosis; vicryl bridging mesh on (7/5) transferred to SICU postoperatively for hemodynamic monitoring, with hospital course complicated by periods of severe ELVI and hyponatremia, which resolved but stepped back up for to SICU on (9/10) for acute AMS, intermittent hypoglycemia, AFib with RVR. Percutaneous Cholecystostomy placed on (9/11) for enlarged GB, PCT capped 10/5 and uncapped 10/25 for hyperbilirubinemia.     NEURO: AMS (resolved), EEG neg. Hx of depression - Effexor resumed. Cont Melatonin prn Insomnia, Cont Zofran. Lorazepam prn. Generalized fatigue and sweats while ambulating-- RVP/Covid neg, orthostatics BP wnl.   HENT: Cepacol  CV: Hx Afib/flutter: s/p DCCV, Off amiodarone given transaminitis; Continue Metoprolol 5q6. Hx HLD: Lipitor Held given high LFTs. TTE  (7/18) - PASP 64mmHg, EF 65-70%,  Hx HTN -  holding Norvasc 10qd. Holding Losartan. Cont Lopressor 5 q6.   PULM: no resp distress on room air.   GI/FEN: High protein diet w bread. Cont Ensure max x1/day. Cont TPN (2.3 L/day volume) for high ostomy and ECF output: Cont Octreotide in TPN, Imodium, Lomotil, Tincture of Opium, Cholestyramine 10/18. Transaminitis of unknown origin, now stable. s/p Perc Choley on 9/11 - tube uncapped 10/25 for worsening hyperbilirubinemia. MRCP done as per Hepatology started on ursidiol will f/u with Hepatology C/s. PPI. Significant drainge from fistula, bolus as needed to keep I&O even. GI following, considering Gattex.  : Voids. 500 cc for dry mucus membranes, keep net even.   ENDO: No need to check ISS. Hx hypothyroid: IV Synthroid, TSH wnl on 10/23  ID: Currently off abx. Cont Nystatin powder // PREVIOUSLY had C. tertium, Lactobacillis from his IR cx 7/3, and candida albicans, lactobacillus, vanc sensitive E faecium, vanc resistant E gallinarum, vanc resistant E casseliflavus, lactobacillus from his OR cx 7/5. Completed course of abx with Imipenem (9/10-9/15). Imipenem (8/26--) imipenem (6/30-7/12, 7/23-7/30), Dapto (6/30-7/5 and 7/23-7/24). Empiric dapto (8/23-24) and cefepime (8/23-24).   PPX: SCDs, SQH  LINES: PIVs, RUE PICC: (11/1-) DC'd: LUE PICC (9/1-11/1 )  WOUNDS/DRAINS: Abdominal wound and fistula with wound manager in place dressing change T & F. RLQ Ostomy. JOYCE. IR PCT.   PT: Ambulate as tolerated   DISPO: SICU due to complicated hospital course. but will do vitals q4hrs (hold overnight), and CBC/BMP QD in setting of high fistula output. Interval Events:  As per Hepatology team started on Ursodiol overnight and Hepatitis screen sent.     Patient seen and examined at bedside. He states his chills have resolved over the past 24 hrs. but he still c/o generalized malaise.       Allergies    penicillin (Angioedema)    Intolerances        Vital Signs Last 24 Hrs  T(C): 36.7 (02 Nov 2023 06:23), Max: 36.7 (02 Nov 2023 06:23)  T(F): 98.1 (02 Nov 2023 06:23), Max: 98.1 (02 Nov 2023 06:23)  HR: 71 (02 Nov 2023 06:05) (71 - 92)  BP: 114/61 (02 Nov 2023 06:05) (98/56 - 128/59)  BP(mean): 79 (02 Nov 2023 06:05) (75 - 88)  RR: 16 (02 Nov 2023 06:05) (15 - 35)  SpO2: 96% (02 Nov 2023 06:05) (95% - 99%)    Parameters below as of 02 Nov 2023 06:05  Patient On (Oxygen Delivery Method): room air        11-01 @ 07:01 - 11-02 @ 07:00  --------------------------------------------------------  IN: 4430 mL / OUT: 3105 mL / NET: 1325 mL      11-01 @ 07:01 - 11-02 @ 07:00  --------------------------------------------------------  IN: 4430 mL / OUT: 3105 mL / NET: 1325 mL        Physical Exam:     Gen: No acute events   Neuro: A&OX3 No deficits  HENT: Dry mucus membranes, scleral icterus   CV: Regular rate and rhythm, no murmurs or gallops, no peripheral edema   Pulm: Lung sounds clear bilaterally   ABD: Soft, ND, NT, no rebound tenderness, no guarding, wound manager in place without leakage--brown output.   Ext: Warm, well-perfused   Vasc: +2 radial/pedal pulses   Skin: Jaundice; no rashes noted  MSK: No joint swelling  Psych: No signs of anxiety or depression      LABS:      CBC Full  -  ( 02 Nov 2023 05:30 )  WBC Count : 6.34 K/uL  RBC Count : 2.29 M/uL  Hemoglobin : 7.3 g/dL  Hematocrit : 23.4 %  Platelet Count - Automated : 173 K/uL  Mean Cell Volume : 102.2 fl  Mean Cell Hemoglobin : 31.9 pg  Mean Cell Hemoglobin Concentration : 31.2 gm/dL  Auto Neutrophil # : x  Auto Lymphocyte # : x  Auto Monocyte # : x  Auto Eosinophil # : x  Auto Basophil # : x  Auto Neutrophil % : x  Auto Lymphocyte % : x  Auto Monocyte % : x  Auto Eosinophil % : x  Auto Basophil % : x    11-02    134<L>  |  104  |  28<H>  ----------------------------<  87  4.1   |  22  |  1.10    Ca    8.0<L>      02 Nov 2023 05:30  Phos  3.9     11-02  Mg     1.8     11-02    TPro  7.5  /  Alb  2.0<L>  /  TBili  5.2<H>  /  DBili  3.6<H>  /  AST  106<H>  /  ALT  85<H>  /  AlkPhos  128<H>  11-02    PT/INR - ( 02 Nov 2023 05:30 )   PT: 14.3 sec;   INR: 1.26          PTT - ( 02 Nov 2023 05:30 )  PTT:34.6 sec      Urinalysis Basic - ( 02 Nov 2023 05:30 )    Color: x / Appearance: x / SG: x / pH: x  Gluc: 87 mg/dL / Ketone: x  / Bili: x / Urobili: x   Blood: x / Protein: x / Nitrite: x   Leuk Esterase: x / RBC: x / WBC x   Sq Epi: x / Non Sq Epi: x / Bacteria: x              RADIOLOGY & ADDITIONAL STUDIES (The following images were personally reviewed):          A/p: 78M w PMH Crohn's, AFib/Flutter s/p DCCVs on amiodarone, remote ileocectomy and open appendectomy. Admitted (6/23) for SBO vs Crohns flare, s/p NGT decompression and s/p lap converted to open TRE, SBR x 3, left in discontinuity with abthera vac on (6/27), RTOR for ileocolic resection, small bowel anastomosis, and abdominal wall closure on (6/28), c/b fluid collection s/p IR aspiration of perihepatic fluid on (7/3), c/b wound dehiscence s/p RTOR exlap, washout, ileocolic resection with end ileostomy, blow hole colostomy, red rubber from ileostomy to small bowel anastomosis; vicryl bridging mesh on (7/5) transferred to SICU postoperatively for hemodynamic monitoring, with hospital course complicated by periods of severe ELVI and hyponatremia, which resolved but stepped back up for to SICU on (9/10) for acute AMS, intermittent hypoglycemia, AFib with RVR. Percutaneous Cholecystostomy placed on (9/11) for enlarged GB, PCT capped 10/5 and uncapped 10/25 for hyperbilirubinemia.     NEURO: AMS (resolved), EEG neg. Hx of depression - Effexor resumed. Cont Melatonin prn Insomnia, Cont Zofran. Lorazepam prn. Generalized fatigue and sweats while ambulating-- RVP/Covid neg, orthostatics BP wnl.   HENT: Cepacol  CV: Hx Afib/flutter: s/p DCCV,  Amio held for Increased LFT'S will possibly restart. Continue Metoprolol 5q6. Hx HLD: Lipitor Held given high LFTs. TTE  (7/18) - PASP 64mmHg, EF 65-70%,  Hx HTN -  holding Norvasc 10qd. Holding Losartan. Cont Lopressor 5 q6.   PULM: no resp distress on room air.   GI/FEN: High protein diet w bread. Cont Ensure max x1/day. Cont TPN/ lipids:  (2.3 L/day volume) for high ostomy and ECF output: Cont Octreotide in TPN, Imodium, Lomotil, Tincture of Opium, Cholestyramine 10/18. Transaminitis of unknown origin, now stable. s/p Perc Choley on 9/11 - tube uncapped 10/25 for worsening hyperbilirubinemia. MRCP done as per Hepatology started on ursidiol will f/u with Hepatology C/s. PPI. Significant drainage from fistula, bolus as needed to keep I&O even. GI following, considering Gattex.  : Voids. 500 cc for dry mucus membranes, keep net even.   ENDO: No need to check ISS. Hx hypothyroid: IV Synthroid, TSH wnl on 10/23  ID: Bld Cx's NGTD. As per Hepatology will get Fungal Bld cx's.  Currently off abx. Cont Nystatin powder // PREVIOUSLY had C. tertium, Lactobacillis from his IR cx 7/3, and candida albicans, lactobacillus, vanc sensitive E faecium, vanc resistant E gallinarum, vanc resistant E casseliflavus, lactobacillus from his OR cx 7/5. Completed course of abx with Imipenem (9/10-9/15). Imipenem (8/26--) imipenem (6/30-7/12, 7/23-7/30), Dapto (6/30-7/5 and 7/23-7/24). Empiric dapto (8/23-24) and cefepime (8/23-24).   PPX: SCDs, SQH  LINES: PIVs, RUE PICC: (11/1-) DC'd: LUE PICC (9/1-11/1 )  WOUNDS/DRAINS: Abdominal wound and fistula with wound manager in place dressing change T & F. RLQ Ostomy. JOYCE. IR PCT.   PT: Ambulate as tolerated   DISPO: SICU due to complicated hospital course. but will do vitals q4hrs (hold overnight), and CBC/BMP QD in setting of high fistula output.

## 2023-11-02 NOTE — PROGRESS NOTE ADULT - SUBJECTIVE AND OBJECTIVE BOX
SUBJECTIVE:  Doing well this AM. CT from yesterday with no significant change from prior. Denies n/v. Endorses f/bm. Denies cp/sob. Pain is well controlled. Ambulating as tolerated. Tolerating diet.    MEDICATIONS  (STANDING):  chlorhexidine 2% Cloths 1 Application(s) Topical <User Schedule>  cholestyramine Powder (Sugar-Free) 4 Gram(s) Oral daily  dextrose 5% + sodium chloride 0.9%. 1000 milliLiter(s) (135 mL/Hr) IV Continuous <Continuous>  diphenoxylate/atropine 2 Tablet(s) Oral every 6 hours  glucagon  Injectable 1 milliGRAM(s) IntraMuscular once  heparin   Injectable 5000 Unit(s) SubCutaneous every 8 hours  influenza  Vaccine (HIGH DOSE) 0.7 milliLiter(s) IntraMuscular once  levothyroxine Injectable 40 MICROGram(s) IV Push at bedtime  lipid, fat emulsion (Fish Oil and Plant Based) 20% Infusion 1.2 Gm/kG/Day (41.67 mL/Hr) IV Continuous <Continuous>  loperamide 4 milliGRAM(s) Oral every 6 hours  metoprolol tartrate Injectable 5 milliGRAM(s) IV Push every 6 hours  nystatin Powder 1 Application(s) Topical three times a day  opium Tincture 6 milliGRAM(s) Oral every 6 hours  Parenteral Nutrition - Adult 1 Each TPN Continuous <Continuous>  ursodiol Capsule 300 milliGRAM(s) Oral every 8 hours  venlafaxine XR. 150 milliGRAM(s) Oral two times a day    MEDICATIONS  (PRN):  LORazepam   Injectable 0.5 milliGRAM(s) IV Push at bedtime PRN Anxiety  ondansetron Injectable 4 milliGRAM(s) IV Push every 8 hours PRN Nausea and/or Vomiting      Vital Signs Last 24 Hrs  T(C): 36.8 (02 Nov 2023 09:08), Max: 36.8 (02 Nov 2023 09:08)  T(F): 98.3 (02 Nov 2023 09:08), Max: 98.3 (02 Nov 2023 09:08)  HR: 71 (02 Nov 2023 10:00) (71 - 90)  BP: 114/61 (02 Nov 2023 06:05) (98/56 - 128/59)  BP(mean): 79 (02 Nov 2023 06:05) (75 - 88)  RR: 13 (02 Nov 2023 10:00) (13 - 25)  SpO2: 96% (02 Nov 2023 10:00) (95% - 99%)    Parameters below as of 02 Nov 2023 10:00  Patient On (Oxygen Delivery Method): room air        Physical Exam:  General: NAD, resting comfortably in bed  Pulmonary: Nonlabored breathing, no respiratory distress  Cardiovascular: NSR  Abdominal: soft, NT/ND, Wound manager in place draining bilious fluid. Wound healing appropriately. LUQ JOYCE in place. R side ileostomy. Perc cony bilious.   Extremities: WWP, normal strength  Neuro: A/O x 3, CNs II-XII grossly intact, no focal deficits    I&O's Summary    01 Nov 2023 07:01  -  02 Nov 2023 07:00  --------------------------------------------------------  IN: 4430 mL / OUT: 3105 mL / NET: 1325 mL    02 Nov 2023 07:01  -  02 Nov 2023 10:50  --------------------------------------------------------  IN: 135 mL / OUT: 100 mL / NET: 35 mL        LABS:                        7.3    6.34  )-----------( 173      ( 02 Nov 2023 05:30 )             23.4     11-02    134<L>  |  104  |  28<H>  ----------------------------<  87  4.1   |  22  |  1.10    Ca    8.0<L>      02 Nov 2023 05:30  Phos  3.9     11-02  Mg     1.8     11-02    TPro  7.5  /  Alb  2.0<L>  /  TBili  5.2<H>  /  DBili  3.6<H>  /  AST  106<H>  /  ALT  85<H>  /  AlkPhos  128<H>  11-02    PT/INR - ( 02 Nov 2023 05:30 )   PT: 14.3 sec;   INR: 1.26          PTT - ( 02 Nov 2023 05:30 )  PTT:34.6 sec  Urinalysis Basic - ( 02 Nov 2023 05:30 )    Color: x / Appearance: x / SG: x / pH: x  Gluc: 87 mg/dL / Ketone: x  / Bili: x / Urobili: x   Blood: x / Protein: x / Nitrite: x   Leuk Esterase: x / RBC: x / WBC x   Sq Epi: x / Non Sq Epi: x / Bacteria: x      CAPILLARY BLOOD GLUCOSE        LIVER FUNCTIONS - ( 02 Nov 2023 05:30 )  Alb: 2.0 g/dL / Pro: 7.5 g/dL / ALK PHOS: 128 U/L / ALT: 85 U/L / AST: 106 U/L / GGT: x             RADIOLOGY & ADDITIONAL STUDIES:

## 2023-11-02 NOTE — PROGRESS NOTE ADULT - SUBJECTIVE AND OBJECTIVE BOX
SUBJECTIVE: Pt seen and examined at bedside this am by surgery team. Tolerating diet and clears. Reports feeling much better since picc line exchanged on 11/1    MEDICATIONS  (STANDING):  chlorhexidine 2% Cloths 1 Application(s) Topical <User Schedule>  cholestyramine Powder (Sugar-Free) 4 Gram(s) Oral daily  dextrose 5% + sodium chloride 0.9%. 1000 milliLiter(s) (135 mL/Hr) IV Continuous <Continuous>  diphenoxylate/atropine 2 Tablet(s) Oral every 6 hours  glucagon  Injectable 1 milliGRAM(s) IntraMuscular once  heparin   Injectable 5000 Unit(s) SubCutaneous every 8 hours  influenza  Vaccine (HIGH DOSE) 0.7 milliLiter(s) IntraMuscular once  levothyroxine Injectable 40 MICROGram(s) IV Push at bedtime  lipid, fat emulsion (Fish Oil and Plant Based) 20% Infusion 1.2 Gm/kG/Day (42.6 mL/Hr) IV Continuous <Continuous>  loperamide 4 milliGRAM(s) Oral every 6 hours  magnesium sulfate  IVPB 1 Gram(s) IV Intermittent once  metoprolol tartrate Injectable 5 milliGRAM(s) IV Push every 6 hours  nystatin Powder 1 Application(s) Topical three times a day  ondansetron   Disintegrating Tablet 4 milliGRAM(s) Oral once  opium Tincture 6 milliGRAM(s) Oral every 6 hours  Parenteral Nutrition - Adult 1 Each TPN Continuous <Continuous>  ursodiol Capsule 300 milliGRAM(s) Oral every 8 hours  venlafaxine XR. 150 milliGRAM(s) Oral two times a day    MEDICATIONS  (PRN):  LORazepam   Injectable 0.5 milliGRAM(s) IV Push at bedtime PRN Anxiety  ondansetron Injectable 4 milliGRAM(s) IV Push every 8 hours PRN Nausea and/or Vomiting      Vital Signs Last 24 Hrs  T(C): 36.7 (02 Nov 2023 06:23), Max: 36.7 (02 Nov 2023 06:23)  T(F): 98.1 (02 Nov 2023 06:23), Max: 98.1 (02 Nov 2023 06:23)  HR: 71 (02 Nov 2023 06:05) (71 - 92)  BP: 114/61 (02 Nov 2023 06:05) (98/56 - 128/59)  BP(mean): 79 (02 Nov 2023 06:05) (75 - 88)  RR: 16 (02 Nov 2023 06:05) (15 - 35)  SpO2: 96% (02 Nov 2023 06:05) (95% - 99%)    Parameters below as of 02 Nov 2023 06:05  Patient On (Oxygen Delivery Method): room air      Physical Exam:  General: NAD, resting comfortably in bed  Pulmonary: Nonlabored breathing, no respiratory distress  Cardiovascular: NSR  Abdominal: Soft, NT, ND abdomen. Wound manager in place draining bilious fluid. Wound continues to heal appropriately, with granulation tissue overlying wound. LUQ JOYCE in place. R side ileostomy with red rubber in place. Perc cony bilious.   Extremities: WWP, normal strength  Neuro: A/O x 3, CNs II-XII grossly intact, no focal deficits        I&O's Detail    01 Nov 2023 07:01  -  02 Nov 2023 07:00  --------------------------------------------------------  IN:    dextrose 5% + sodium chloride 0.9%: 2700 mL    IV PiggyBack: 100 mL    IV PiggyBack: 1000 mL    Oral Fluid: 100 mL    TPN (Total Parenteral Nutrition): 530 mL  Total IN: 4430 mL    OUT:    Bulb (mL): 5 mL    Drain (mL): 635 mL    Drain (mL): 175 mL    Drain (mL): 715 mL    Voided (mL): 1575 mL  Total OUT: 3105 mL    Total NET: 1325 mL      02 Nov 2023 07:01  -  02 Nov 2023 08:59  --------------------------------------------------------  IN:    dextrose 5% + sodium chloride 0.9%: 135 mL  Total IN: 135 mL    OUT:    Drain (mL): 90 mL    Drain (mL): 10 mL  Total OUT: 100 mL    Total NET: 35 mL          LABS:                        7.3    6.34  )-----------( 173      ( 02 Nov 2023 05:30 )             23.4     11-02    134<L>  |  104  |  28<H>  ----------------------------<  87  4.1   |  22  |  1.10    Ca    8.0<L>      02 Nov 2023 05:30  Phos  3.9     11-02  Mg     1.8     11-02    TPro  7.5  /  Alb  2.0<L>  /  TBili  5.2<H>  /  DBili  3.6<H>  /  AST  106<H>  /  ALT  85<H>  /  AlkPhos  128<H>  11-02    PT/INR - ( 02 Nov 2023 05:30 )   PT: 14.3 sec;   INR: 1.26          PTT - ( 02 Nov 2023 05:30 )  PTT:34.6 sec  Urinalysis Basic - ( 02 Nov 2023 05:30 )    Color: x / Appearance: x / SG: x / pH: x  Gluc: 87 mg/dL / Ketone: x  / Bili: x / Urobili: x   Blood: x / Protein: x / Nitrite: x   Leuk Esterase: x / RBC: x / WBC x   Sq Epi: x / Non Sq Epi: x / Bacteria: x        RADIOLOGY & ADDITIONAL STUDIES:  CT abdomen and pelvis 11/1: LOWER CHEST: Small right pleural effusion is again appreciated. Linear atelectasis is again seen at the lung bases. Coronary artery calcification is seen. Partially visualized catheter with the tip in the superior vena cava. Loop recorder LIVER: Small cyst peripherally in the posterior segment of the right lobe. Tiny hypodensities in the right lobe at the dome too small to characterize and unchanged BILE DUCTS: Normal caliber. GALLBLADDER: Decompressed by a cholecystostomy tube SPLEEN: Within normal limits. PANCREAS: Cystic foci are again seen. Mild dilatation of the pancreatic duct is also again noted ADRENALS: Within normal limits. KIDNEYS/URETERS: Bilateral renal cysts and hypodensities too small to characterize. No urolithiasis or hydronephrosis BLADDER: Within normal limits. REPRODUCTIVE ORGANS: Prostate is enlarged. BOWEL: No bowel obstruction. Mild colonic diverticulosis without diverticulitis. PERITONEUM: 2 abdominal drainage catheters are again appreciated with the tips in the mid to lower abdomen. VESSELS: Atherosclerotic changes. RETROPERITONEUM/LYMPH NODES: No lymphadenopathy. ABDOMINAL WALL: Midline anterior abdominal wall defects again seen. Ostomy again noted. Fat-containing left inguinal hernia. BONES: Degenerative changes. Levoscoliosis IMPRESSION: No significant change. No evidence of collection.    Doppler US 11/1: Acute DVT involving the right distal brachial vein. Superficial venous thrombosis involving the bilateral cephalic veins and left basilic vein.

## 2023-11-02 NOTE — PROGRESS NOTE ADULT - SUBJECTIVE AND OBJECTIVE BOX
GI Consult Progress Note:     OVERNIGHT EVENTS: AMRITA.     SUBJECTIVE / INTERVAL HPI:   Patient seen and examined at bedside. States that he feels well overall. No further episodes of sweating/weakness. Plans to try to eat some food today. Otherwise no acute complaints. Started on ursodiol.         VITAL SIGNS:  Vital Signs Last 24 Hrs  T(C): 36.8 (02 Nov 2023 09:08), Max: 36.8 (02 Nov 2023 09:08)  T(F): 98.3 (02 Nov 2023 09:08), Max: 98.3 (02 Nov 2023 09:08)  HR: 71 (02 Nov 2023 10:00) (71 - 90)  BP: 114/61 (02 Nov 2023 06:05) (98/56 - 128/59)  BP(mean): 79 (02 Nov 2023 06:05) (75 - 88)  RR: 13 (02 Nov 2023 10:00) (13 - 25)  SpO2: 96% (02 Nov 2023 10:00) (95% - 99%)    Parameters below as of 02 Nov 2023 10:00  Patient On (Oxygen Delivery Method): room air        11-01-23 @ 07:01  -  11-02-23 @ 07:00  --------------------------------------------------------  IN: 4430 mL / OUT: 3105 mL / NET: 1325 mL    11-02-23 @ 07:01  -  11-02-23 @ 10:46  --------------------------------------------------------  IN: 135 mL / OUT: 100 mL / NET: 35 mL      PHYSICAL EXAM:  General: No acute distress  Lungs: Normal respiratory effort and no intercostal retractions  Cardiovascular: RRR  Abdomen: Soft, non-tender, non-distended, +gallbladder perc cony drain on R, +ostomy bag on right, +fistula bag on left with brown contents   Neurological: Alert and oriented x3  Skin: Warm and dry. No obvious rash      MEDICATIONS:  MEDICATIONS  (STANDING):  chlorhexidine 2% Cloths 1 Application(s) Topical <User Schedule>  cholestyramine Powder (Sugar-Free) 4 Gram(s) Oral daily  dextrose 5% + sodium chloride 0.9%. 1000 milliLiter(s) (135 mL/Hr) IV Continuous <Continuous>  diphenoxylate/atropine 2 Tablet(s) Oral every 6 hours  glucagon  Injectable 1 milliGRAM(s) IntraMuscular once  heparin   Injectable 5000 Unit(s) SubCutaneous every 8 hours  influenza  Vaccine (HIGH DOSE) 0.7 milliLiter(s) IntraMuscular once  levothyroxine Injectable 40 MICROGram(s) IV Push at bedtime  lipid, fat emulsion (Fish Oil and Plant Based) 20% Infusion 1.2 Gm/kG/Day (41.67 mL/Hr) IV Continuous <Continuous>  loperamide 4 milliGRAM(s) Oral every 6 hours  metoprolol tartrate Injectable 5 milliGRAM(s) IV Push every 6 hours  nystatin Powder 1 Application(s) Topical three times a day  opium Tincture 6 milliGRAM(s) Oral every 6 hours  Parenteral Nutrition - Adult 1 Each TPN Continuous <Continuous>  ursodiol Capsule 300 milliGRAM(s) Oral every 8 hours  venlafaxine XR. 150 milliGRAM(s) Oral two times a day    MEDICATIONS  (PRN):  LORazepam   Injectable 0.5 milliGRAM(s) IV Push at bedtime PRN Anxiety  ondansetron Injectable 4 milliGRAM(s) IV Push every 8 hours PRN Nausea and/or Vomiting      ALLERGIES:  Allergies    penicillin (Angioedema)    Intolerances        LABS:                        7.3    6.34  )-----------( 173      ( 02 Nov 2023 05:30 )             23.4     11-02    134<L>  |  104  |  28<H>  ----------------------------<  87  4.1   |  22  |  1.10    Ca    8.0<L>      02 Nov 2023 05:30  Phos  3.9     11-02  Mg     1.8     11-02    TPro  7.5  /  Alb  2.0<L>  /  TBili  5.2<H>  /  DBili  3.6<H>  /  AST  106<H>  /  ALT  85<H>  /  AlkPhos  128<H>  11-02    PT/INR - ( 02 Nov 2023 05:30 )   PT: 14.3 sec;   INR: 1.26          PTT - ( 02 Nov 2023 05:30 )  PTT:34.6 sec  Urinalysis Basic - ( 02 Nov 2023 05:30 )    Color: x / Appearance: x / SG: x / pH: x  Gluc: 87 mg/dL / Ketone: x  / Bili: x / Urobili: x   Blood: x / Protein: x / Nitrite: x   Leuk Esterase: x / RBC: x / WBC x   Sq Epi: x / Non Sq Epi: x / Bacteria: x      CAPILLARY BLOOD GLUCOSE      POCT Blood Glucose.: 119 mg/dL (01 Nov 2023 05:50)        RADIOLOGY & ADDITIONAL TESTS: Reviewed.

## 2023-11-02 NOTE — PROGRESS NOTE ADULT - ASSESSMENT
78 M w/ Crohn's, AFib/Flutter s/p DCCVs on amiodarone, remote ileocectomy and open appendectomy. Admitted (6/23) for SBO vs Crohns flare, s/p NGT decompression and s/p lap TRE converted to open TRE, SBR x 3, left in discontinuity with abthera vac on (6/27), RTOR for ileocolic resection, small bowel anastomosis, and abdominal wall closure on (6/28), c/b fluid collection s/p IR aspiration of perihepatic fluid on (7/3), c/b wound dehiscence s/p RTOR exlap, washout, ileocolic resection with end ileostomy, blow hole colostomy, red rubber from ileostomy to small bowel anastomosis; vicryl bridging mesh on (7/5) transferred to SICU postoperatively for hemodynamic monitoring, with hospital course complicated by periods of AMS most recently on 9/1 and associated with oliguria, severe ELVI and hyponatremia, which resolved but stepped back up for to SICU on 9/10 for acute AMS, intermittent hypoglycemia, AFib with RVR. Percutaneous Cholecystostomy placed on 9/11 for enlarged GB, PCT capped 10/5. 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    Plan:  - Trend LFTs, keep perc cony uncapped  - OK for high fiber diet  - C/w TPN  - Continue to monitor strict I&Os, bolus as needed for repletion  - Gattex authorization, however, do not start Gattex without multidisciplinary discussion  - Wound manager changes as per wound care nursing staff  Team 4c will continue to follow, please call with any questions or concerns

## 2023-11-02 NOTE — PROGRESS NOTE ADULT - ASSESSMENT
78 M w/ Crohn's, AFib/Flutter s/p DCCVs on amiodarone, remote ileocectomy and open appendectomy. Admitted (6/23) for SBO vs Crohns flare, s/p NGT decompression and s/p lap TRE converted to open TRE, SBR x 3, left in discontinuity with abthera vac on (6/27), RTOR for ileocolic resection, small bowel anastomosis, and abdominal wall closure on (6/28), c/b fluid collection s/p IR aspiration of perihepatic fluid on (7/3), c/b wound dehiscence s/p RTOR exlap, washout, ileocolic resection with end ileostomy, blow hole colostomy, red rubber from ileostomy to small bowel anastomosis; vicryl bridging mesh on (7/5) transferred to SICU postoperatively for hemodynamic monitoring, with hospital course complicated by periods of AMS most recently on 9/1 and associated with oliguria, severe ELVI and hyponatremia, which resolved but stepped back up for to SICU on 9/10 for acute AMS, intermittent hypoglycemia, AFib with RVR. 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 2.7 cm duodenal diverticulum. Blood cultures 10/30 No growth so far. New distal brachial vein DVT and superficial venous thrombosis. CT with no interval change.     Plan:  - Follow blood and bile cultures sent 10/30  - Trend LFTs, keep perc cony uncapped  - C/w TPN and start PO regular diet with liquid protein supplements   - Continue to monitor strict I&Os, bolus as needed for repletion  - Pending as per GI Gattex authorization, however, do not start Gattex without multidisciplinary discussion  - Wound manager changes as per wound care nursing staff (Tuesdays and Fridays)  - Follow hepatology recs Ursodiol TID.   - Will discuss starting AC for new upper extremity DVT    Team 5 will continue to follow, please call with any questions or concerns

## 2023-11-03 NOTE — PROGRESS NOTE ADULT - ASSESSMENT
78M w PMH Crohn's, AFib/Flutter s/p DCCVs on amiodarone, remote ileocectomy and open appendectomy. Admitted (6/23) for SBO vs Crohns flare, s/p NGT decompression and s/p lap converted to open TRE, SBR x 3, left in discontinuity with abthera vac on (6/27), RTOR for ileocolic resection, small bowel anastomosis, and abdominal wall closure on (6/28), c/b fluid collection s/p IR aspiration of perihepatic fluid on (7/3), c/b wound dehiscence s/p RTOR exlap, washout, ileocolic resection with end ileostomy, blow hole colostomy, red rubber from ileostomy to small bowel anastomosis; vicryl bridging mesh on (7/5) transferred to SICU postoperatively for hemodynamic monitoring, with hospital course complicated by periods of severe ELVI and hyponatremia, which resolved but stepped back up for to SICU on (9/10) for acute AMS, intermittent hypoglycemia, AFib with RVR. Percutaneous Cholecystostomy placed on (9/11) for enlarged GB, PCT capped 10/5 and uncapped 10/25 for hyperbilirubinemia. right brachial DVT, left basillic and cephalic superficial thrombus on duplex 11/2.     NEURO: AMS (resolved), EEG neg. Hx of depression - cont Effexor . Cont Melatonin prn Insomnia, Cont Zofran. Lorazepam prn. Generalized fatigue and sweats while ambulating-- RVP/Covid neg, orthostatics BP wnl.   HENT: Cepacol  CV: Hx Afib/flutter: s/p DCCV,  Amio held for Increased LFT'S. Continue Metoprolol 5q6. Hx HLD: Lipitor Held given high LFTs. TTE  (7/18) - PASP 64mmHg, EF 65-70%,  Hx HTN -  holding Norvasc 10qd. Holding Losartan. Cont Lopressor 5 q6.   PULM: no resp distress on room air.   GI/FEN: High protein diet w/ bread. Cont Ensure max x1/day. Cont TPN/ lipids:  (2.3 L/day volume) for high ostomy and ECF output: Cont Octreotide in TPN, Imodium, Lomotil, Tincture of Opium, Cholestyramine 10/18. Transaminitis of unknown origin, now stable. s/p Perc Choley on 9/11 - tube uncapped 10/25 for worsening hyperbilirubinemia. MRCP done as per Hepatology started on ursidiol, cont PPI. monitor drainage from fistula, bolus as needed to keep I&O even. GI following, considering Gattex.  : Voids.   ENDO: No need to check ISS. Hx hypothyroid: IV Synthroid, TSH wnl on 10/23  ID: Bld Cx's NGTD. As per Hepatology will get Fungal Bld cx's.  Currently off abx. Cont Nystatin powder // PREVIOUSLY had C. tertium, Lactobacillis from his IR cx 7/3, and candida albicans, lactobacillus, vanc sensitive E faecium, vanc resistant E gallinarum, vanc resistant E casseliflavus, lactobacillus from his OR cx 7/5. Completed course of abx with Imipenem (9/10-9/15). Imipenem (8/26--) imipenem (6/30-7/12, 7/23-7/30), Dapto (6/30-7/5 and 7/23-7/24). Empiric dapto (8/23-24) and cefepime (8/23-24).   PPX: SCDs, SQH  LINES: PIVs, RUE PICC: (11/1-) DC'd: LUE PICC (9/1-11/1 )  WOUNDS/DRAINS: Abdominal wound and fistula with wound manager in place dressing change T & F. RLQ Ostomy. JOYCE. IR perc cony.   PT: Ambulate as tolerated   DISPO: SICU due to complicated hospital course. but will do vitals q4hrs (hold overnight), and CBC/BMP QD in setting of high fistula output.

## 2023-11-03 NOTE — PROGRESS NOTE ADULT - NS ATTEND AMEND GEN_ALL_CORE FT
Crohn's, AF, s/p SBR, ileocolic resection, end ileostomy, with enteroatmospheric fistula, brachial DVT  physical as above  C creatinine > 30, will start lovenox  continue metoprolol  follow LFT on ursodiol  trial of omegaven lipids for PNAC  LFTs otherwise stable with some decrease in bilirubin  TPN written

## 2023-11-03 NOTE — CHART NOTE - NSCHARTNOTEFT_GEN_A_CORE
Admitting Diagnosis:   Patient is a 78y old  Male who presents with a chief complaint of SBO (2023 07:01)      PAST MEDICAL & SURGICAL HISTORY:  Essential hypertension  Hypertension      Atrial fibrillation  s/p cardioversion  and   Pt. reports 4 DCCV  Now on Amiodarone 200mg PO bid and Eliquis 5mg PO bid  Last DCCV 4 yrs ago at [facility]      Crohn's disease  s/p partial resection of ileum      Hyperlipidemia      Hypothyroidism      History of depression  On Venlafaxine ER 150mg PO bid      Junctional rhythm      H/O knee surgery      History of cataract surgery          Current Nutrition Order:  TPN- 2.3L bag running over 18hrs (70ml/hr), providing 411g Dex, 144g AA, 100g lipids; provides 2973.4 kcals, 144g protein, GIR 3.35 mg/kg/min  PO- 1 Ensure Max daily (150 kcals, 30g protein), 2 LPS (200kcal, 30g pro), cottage cheese, egg white, bread    PO Intake: Good (%) [ X ]  Fair (50-75%) [   ] Poor (<25%) [   ]    GI Issues: LUQ drain 15cc x 24hrs, ileostomy 70cc x 24hrs, abdominal wound/fistula output 7500cc x 24hrs, per cony 590cc x 24hrs; some nausea reported today    Pain: no pain/discomfort noted    Skin Integrity: abd wound [10.5 x 6.5cm] and fistula with wound manager in place, RLQ ileostomy, L JOYCE drain, perc cony drain, skin tear below ostomy. wound to R heel resolved  Rudy score 19; slightly jaundiced      Labs:       134<L>  |  104  |  26<H>  ----------------------------<  121<H>  3.8   |  23  |  1.09    Ca    8.1<L>      2023 05:30  Phos  3.3       Mg     2.1         TPro  7.3  /  Alb  1.9<L>  /  TBili  4.9<H>  /  DBili  3.5<H>  /  AST  100<H>  /  ALT  81<H>  /  AlkPhos  130<H>      CAPILLARY BLOOD GLUCOSE          Medications:  MEDICATIONS  (STANDING):  chlorhexidine 0.12% Liquid 15 milliLiter(s) Swish and Spit two times a day  chlorhexidine 2% Cloths 1 Application(s) Topical <User Schedule>  cholestyramine Powder (Sugar-Free) 4 Gram(s) Oral daily  diphenoxylate/atropine 2 Tablet(s) Oral every 6 hours  glucagon  Injectable 1 milliGRAM(s) IntraMuscular once  heparin   Injectable 5000 Unit(s) SubCutaneous every 8 hours  influenza  Vaccine (HIGH DOSE) 0.7 milliLiter(s) IntraMuscular once  levothyroxine Injectable 40 MICROGram(s) IV Push at bedtime  lipid, fat emulsion (Fish Oil and Plant Based) 20% Infusion 1.2 Gm/kG/Day (41.67 mL/Hr) IV Continuous <Continuous>  loperamide 4 milliGRAM(s) Oral every 6 hours  metoprolol tartrate Injectable 5 milliGRAM(s) IV Push every 6 hours  nystatin Powder 1 Application(s) Topical three times a day  opium Tincture 6 milliGRAM(s) Oral every 6 hours  Parenteral Nutrition - Adult 1 Each TPN Continuous <Continuous>  Parenteral Nutrition - Adult 1 Each TPN Continuous <Continuous>  ursodiol Capsule 300 milliGRAM(s) Oral every 8 hours  venlafaxine XR. 150 milliGRAM(s) Oral two times a day    MEDICATIONS  (PRN):  LORazepam   Injectable 0.5 milliGRAM(s) IV Push at bedtime PRN Anxiety  ondansetron Injectable 4 milliGRAM(s) IV Push every 8 hours PRN Nausea and/or Vomiting    Daily Weight in k.2 (2023 01:07)  Daily 85.2kg [31 Oct 2023]  Daily 85.2kg [24 Oct 2023]  Daily 85.2kg [20 Oct 2023]  Daily 81.9kg [18 Oct 2023]  Daily 85.2kg [16 Oct 2023]  Daily   95.2kg [12 Oct 2023]   Daily 87.7kg [11 Oct 2023]  Daily 87.4kg [09 Oct 2023]  Daily 86.4kg [07 Oct 2023]  Daily 82.5kg [06 Oct 2023]  Daily 82.6kg [4 Oct 2023]   Daily 83.5kg [03 Oct 2023]  Daily 84.5kg [2 Oct 2023]  Daily 87.2kg [1 Oct 2023]  Daily 88.3kg [29 Sep 2023]  Daily 89.9kg [28 Sep 2023]  Daily 87.7kg [24 Sep 2023]  Daily 90.4kg [21 Sep 2023]  Daily 91.4kg [20 Sep 2023]  Daily 86.7kg [18 Sep 2023]  Daily 88.1kg [16 Sep 2023]  Daily 88.9kg [15 Sep 2023]   Daily 89.1 [14 Sep 2023]  Daily 92.9kg [6 Sep 2023]  Daily 91.8kg [27 Aug 2023]  Daily 101kg [9 Aug 2023]  Daily   102.1kg [10 July 2023]  Daily 99.9kg [2023]    Weight Change: weight trending down throughout admission. Recommend continue weekly/daily weights for trending & ensuring adequacy of nutrition provision    IBW: 86.4kg   % IBW: 98.6%    Estimated energy needs:   Current body wt [85.2kg] used for energy calculations as pt <100% IBW. Needs estimated for age and adjusted for current clinical status, increased needs for post-op & open abd wound healing    Calories: 2201-9787 kcals based on 30-40 kcals/kg  Protein: 127.8-170.4 g protein based on 1.5-2.0g protein/kg + additional depending on outputs  *Fluid needs per team    Subjective:   78 M w/ Crohn's, AFib/Flutter s/p DCCVs on amiodarone, remote ileocectomy and open appendectomy. Admitted () for SBO vs Crohns flare, s/p NGT decompression and s/p lap TRE converted to open TRE, SBR x 3, left in discontinuity with abthera vac on (), RTOR for ileocolic resection, small bowel anastomosis, and abdominal wall closure on (), c/b fluid collection s/p IR aspiration of perihepatic fluid on (7/3), c/b wound dehiscence s/p RTOR exlap, washout, ileocolic resection with end ileostomy, blow hole colostomy, red rubber from ileostomy to small bowel anastomosis; vicryl bridging mesh on () transferred to SICU postoperatively for hemodynamic monitoring, with hospital course complicated by periods of AMS most recently on  and associated with oliguria, severe ELVI and hyponatremia, which resolved but stepped back up for to SICU on 9/10 for acute AMS, intermittent hypoglycemia, AFib with RVR. Percutaneous Cholecystostomy placed on  for enlarged GB, PCT capped 10/5.    Chart reviewed. Pt seen at bedside on 5 EA today with IDT during AM rounds. TPN remains primary means to nutrition.  Urosiol added- T. Rafa     Previous Nutrition Diagnosis: Increased Nutrient Needs R/T physiological demands for nutrient AEB post-op wound healing    Active [   ]  Resolved [   ]    If resolved, new PES:     Goal:    Recommendations:    Education:     Risk Level: High [   ] Moderate [   ] Low [   ] Admitting Diagnosis:   Patient is a 78y old  Male who presents with a chief complaint of SBO (2023 07:01)      PAST MEDICAL & SURGICAL HISTORY:  Essential hypertension  Hypertension      Atrial fibrillation  s/p cardioversion  and   Pt. reports 4 DCCV  Now on Amiodarone 200mg PO bid and Eliquis 5mg PO bid  Last DCCV 4 yrs ago at [facility]      Crohn's disease  s/p partial resection of ileum      Hyperlipidemia      Hypothyroidism      History of depression  On Venlafaxine ER 150mg PO bid      Junctional rhythm      H/O knee surgery      History of cataract surgery          Current Nutrition Order:  TPN- 2.3L bag running over 18hrs (70ml/hr), providing 411g Dex, 144g AA, 100g lipids; provides 2973.4 kcals, 144g protein, GIR 3.35 mg/kg/min  PO- 1 Ensure Max daily (150 kcals, 30g protein), 2 LPS (200kcal, 30g pro), cottage cheese, egg white, bread    PO Intake: Good (%) [ X ]  Fair (50-75%) [   ] Poor (<25%) [   ]    GI Issues: LUQ drain 15cc x 24hrs, ileostomy 70cc x 24hrs, abdominal wound/fistula output 7500cc x 24hrs, per cony 590cc x 24hrs; some nausea reported today    Pain: no pain/discomfort noted    Skin Integrity: abd wound [10.5 x 6.5cm] and fistula with wound manager in place, RLQ ileostomy, L JOYCE drain, perc cony drain, skin tear below ostomy. wound to R heel resolved  Rudy score 19; slightly jaundiced      Labs:       134<L>  |  104  |  26<H>  ----------------------------<  121<H>  3.8   |  23  |  1.09    Ca    8.1<L>      2023 05:30  Phos  3.3       Mg     2.1         TPro  7.3  /  Alb  1.9<L>  /  TBili  4.9<H>  /  DBili  3.5<H>  /  AST  100<H>  /  ALT  81<H>  /  AlkPhos  130<H>      CAPILLARY BLOOD GLUCOSE          Medications:  MEDICATIONS  (STANDING):  chlorhexidine 0.12% Liquid 15 milliLiter(s) Swish and Spit two times a day  chlorhexidine 2% Cloths 1 Application(s) Topical <User Schedule>  cholestyramine Powder (Sugar-Free) 4 Gram(s) Oral daily  diphenoxylate/atropine 2 Tablet(s) Oral every 6 hours  glucagon  Injectable 1 milliGRAM(s) IntraMuscular once  heparin   Injectable 5000 Unit(s) SubCutaneous every 8 hours  influenza  Vaccine (HIGH DOSE) 0.7 milliLiter(s) IntraMuscular once  levothyroxine Injectable 40 MICROGram(s) IV Push at bedtime  lipid, fat emulsion (Fish Oil and Plant Based) 20% Infusion 1.2 Gm/kG/Day (41.67 mL/Hr) IV Continuous <Continuous>  loperamide 4 milliGRAM(s) Oral every 6 hours  metoprolol tartrate Injectable 5 milliGRAM(s) IV Push every 6 hours  nystatin Powder 1 Application(s) Topical three times a day  opium Tincture 6 milliGRAM(s) Oral every 6 hours  Parenteral Nutrition - Adult 1 Each TPN Continuous <Continuous>  Parenteral Nutrition - Adult 1 Each TPN Continuous <Continuous>  ursodiol Capsule 300 milliGRAM(s) Oral every 8 hours  venlafaxine XR. 150 milliGRAM(s) Oral two times a day    MEDICATIONS  (PRN):  LORazepam   Injectable 0.5 milliGRAM(s) IV Push at bedtime PRN Anxiety  ondansetron Injectable 4 milliGRAM(s) IV Push every 8 hours PRN Nausea and/or Vomiting    Daily Weight in k.2 (2023 01:07)  Daily 85.2kg [31 Oct 2023]  Daily 85.2kg [24 Oct 2023]  Daily 85.2kg [20 Oct 2023]  Daily 81.9kg [18 Oct 2023]  Daily 85.2kg [16 Oct 2023]  Daily   95.2kg [12 Oct 2023]   Daily 87.7kg [11 Oct 2023]  Daily 87.4kg [09 Oct 2023]  Daily 86.4kg [07 Oct 2023]  Daily 82.5kg [06 Oct 2023]  Daily 82.6kg [4 Oct 2023]   Daily 83.5kg [03 Oct 2023]  Daily 84.5kg [2 Oct 2023]  Daily 87.2kg [1 Oct 2023]  Daily 88.3kg [29 Sep 2023]  Daily 89.9kg [28 Sep 2023]  Daily 87.7kg [24 Sep 2023]  Daily 90.4kg [21 Sep 2023]  Daily 91.4kg [20 Sep 2023]  Daily 86.7kg [18 Sep 2023]  Daily 88.1kg [16 Sep 2023]  Daily 88.9kg [15 Sep 2023]   Daily 89.1 [14 Sep 2023]  Daily 92.9kg [6 Sep 2023]  Daily 91.8kg [27 Aug 2023]  Daily 101kg [9 Aug 2023]  Daily   102.1kg [10 July 2023]  Daily 99.9kg [2023]    Weight Change: weight trending down throughout admission. Recommend continue weekly/daily weights for trending & ensuring adequacy of nutrition provision    IBW: 86.4kg   % IBW: 98.6%    Estimated energy needs:   Current body wt [85.2kg] used for energy calculations as pt <100% IBW. Needs estimated for age and adjusted for current clinical status, increased needs for post-op & open abd wound healing    Calories: 0437-9657 kcals based on 30-40 kcals/kg  Protein: 127.8-170.4 g protein based on 1.5-2.0g protein/kg + additional depending on outputs  *Fluid needs per team    Subjective:   78 M w/ Crohn's, AFib/Flutter s/p DCCVs on amiodarone, remote ileocectomy and open appendectomy. Admitted () for SBO vs Crohns flare, s/p NGT decompression and s/p lap TRE converted to open TRE, SBR x 3, left in discontinuity with abthera vac on (), RTOR for ileocolic resection, small bowel anastomosis, and abdominal wall closure on (), c/b fluid collection s/p IR aspiration of perihepatic fluid on (7/3), c/b wound dehiscence s/p RTOR exlap, washout, ileocolic resection with end ileostomy, blow hole colostomy, red rubber from ileostomy to small bowel anastomosis; vicryl bridging mesh on () transferred to SICU postoperatively for hemodynamic monitoring, with hospital course complicated by periods of AMS most recently on  and associated with oliguria, severe ELVI and hyponatremia, which resolved but stepped back up for to SICU on 9/10 for acute AMS, intermittent hypoglycemia, AFib with RVR. Percutaneous Cholecystostomy placed on  for enlarged GB, PCT capped 10/5.    Chart reviewed, labs & Rx reviewed. Pt seen at bedside on 5 EA today with IDT during AM rounds, on room air. TPN remains primary means to nutrition, allowed for few PO foods per sx team- cottage cheese, egg whites, bread due to early absorption. Remains on Ensure max supplement daily. No food particles noted in output today. Ursodiol added - T. Rafa 5.3 [] --> 4.9 [11/3], slight improvement. To continue cycling TPN over 18hrs, consider decreasing to 12-16hrs. Serum copper 17 ug/dL 10/16- to provide 3x/wk [MWF] in TPN bag and recheck levels. Labs: Na 134 <L> [monitor volume/hydrations status], BUN 26 <H>, Creat WNL- monitor hydration status. total bilirubin 4.9 <H>, direct bilirubin 3.5 <H>, Alk phos 130 <H>,  <H>, ALT 81 <H>,  <H>.Remains on cholestyramine, opium tincture Q6hrs, imodium, synthroid [administer 30-60 minutes before food; separate at least 4hrs from calcium or iron-containing products or bile acid sequestrants], zofran prn. RDN will continue to monitor, reassess, and intervene as appropriate.     Previous Nutrition Diagnosis: Increased Nutrient Needs R/T physiological demands for nutrient AEB post-op wound healing    Active [ X  ]  Resolved [   ]    If resolved, new PES:     Goal:  Pt will meet at least 75% of protein & energy needs via most appropriate route for nutrition     Recommendations:  1. c/w TPN via PICC as primary means to nutrition- 411g Dex, 144g AA, 100g Lipids; provides 2973.4 kcals, 144g protein, GIR 3.35 mg/kg/min  - provides 34.9 kcals/kg, 28.1 non-protein kcals/kg, 1.7g protein/kg  - consider cycling TPN over 12-16hrs, consider providing fish oil ILE monotherapy or additional in conjunction with SMOF lipids [decrease SMOF]  - monitor LFTs closely, consider decreasing SMOF lipids to 50g/day    - MVI, selenium, zinc in TPN bag  - provide copper in TPN 3x/week [MWF]-- recheck levels   - monitor weights & wound healing, adjust substrates as indicated  2. Fluid & lytes per team  - additional fluid bolus prn  3. PO diet per team/MD discretion  - Ensure Max daily; provides 150 kcals, 30g protein [low sugar, high protein content]  - cottage cheese, bread, egg whites ok per team  - advance/hold PO as indicated  4. Weekly lipid panel   - hold/decrease lipids if TG >400  5. Monitor BMP/Mg/Phos, POC BG  - monitor & replenish lytes outside TPN bag prn  6. Continue close monitoring of clinical course & adjust recommendations prn  7. Monitor feasibility for advancing PO diet    Education: PO diet    Risk Level: High [  X ] Moderate [   ] Low [   ]

## 2023-11-03 NOTE — PROGRESS NOTE ADULT - SUBJECTIVE AND OBJECTIVE BOX
S: No new issues/events overnight, no new med c/o    O: ICU Vital Signs Last 24 Hrs  T(F): 98.4 (11-03-23 @ 05:18), Max: 98.4 (11-03-23 @ 01:07)  HR: 89 (11-03-23 @ 10:00) (64 - 89)  BP: 112/58 (11-03-23 @ 10:00) (109/50 - 138/70)  BP(mean): 84 (11-03-23 @ 10:00) (71 - 92)  ABP: --  RR: 17 (11-03-23 @ 10:00) (14 - 27)  SpO2: 95% (11-03-23 @ 10:00) (93% - 97%)    PHYSICAL EXAM:   Neurological: AAOx3, CNII-XII intact,  strength 5/5 b/l  ENT: mucus membrane moist  Cardiovascular: RRR  Respiratory: CTA  Gastrointestinal: soft, wound manager with grayish output. Scant output from JOYCE, Perc cony bilious drainage.   Extremities: warm, no dependent edema, right PICC site no erythema, no pus.   Vascular: no cyanosis/erythema  Skin: no rashes  MSK: no joint swelling.     LABS:    11-03    134<L>  |  104  |  26<H>  ----------------------------<  121<H>  3.8   |  23  |  1.09    Ca    8.1<L>      03 Nov 2023 05:30  Phos  3.3     11-03  Mg     2.1     11-03    TPro  7.3  /  Alb  1.9<L>  /  TBili  4.9<H>  /  DBili  3.5<H>  /  AST  100<H>  /  ALT  81<H>  /  AlkPhos  130<H>  11-03  LIVER FUNCTIONS - ( 03 Nov 2023 05:30 )  Alb: 1.9 g/dL / Pro: 7.3 g/dL / ALK PHOS: 130 U/L / ALT: 81 U/L / AST: 100 U/L / GGT: x                               7.3    6.34  )-----------( 173      ( 02 Nov 2023 05:30 )             23.4   PT/INR - ( 03 Nov 2023 08:07 )   PT: 14.0 sec;   INR: 1.24          PTT - ( 03 Nov 2023 08:07 )  PTT:39.8 sec  Urinalysis Basic - ( 03 Nov 2023 05:30 )    Color: x / Appearance: x / SG: x / pH: x  Gluc: 121 mg/dL / Ketone: x  / Bili: x / Urobili: x   Blood: x / Protein: x / Nitrite: x   Leuk Esterase: x / RBC: x / WBC x   Sq Epi: x / Non Sq Epi: x / Bacteria: x    CAPILLARY BLOOD GLUCOSE        MEDICATIONS  (STANDING):  chlorhexidine 0.12% Liquid 15 milliLiter(s) Swish and Spit two times a day  chlorhexidine 2% Cloths 1 Application(s) Topical <User Schedule>  cholestyramine Powder (Sugar-Free) 4 Gram(s) Oral daily  diphenoxylate/atropine 2 Tablet(s) Oral every 6 hours  glucagon  Injectable 1 milliGRAM(s) IntraMuscular once  heparin   Injectable 5000 Unit(s) SubCutaneous every 8 hours  influenza  Vaccine (HIGH DOSE) 0.7 milliLiter(s) IntraMuscular once  levothyroxine Injectable 40 MICROGram(s) IV Push at bedtime  lipid, fat emulsion (Fish Oil and Plant Based) 20% Infusion 1.2 Gm/kG/Day (41.67 mL/Hr) IV Continuous <Continuous>  loperamide 4 milliGRAM(s) Oral every 6 hours  metoprolol tartrate Injectable 5 milliGRAM(s) IV Push every 6 hours  nystatin Powder 1 Application(s) Topical three times a day  opium Tincture 6 milliGRAM(s) Oral every 6 hours  Parenteral Nutrition - Adult 1 Each TPN Continuous <Continuous>  Parenteral Nutrition - Adult 1 Each TPN Continuous <Continuous>  ursodiol Capsule 300 milliGRAM(s) Oral every 8 hours  venlafaxine XR. 150 milliGRAM(s) Oral two times a day    MEDICATIONS  (PRN):  LORazepam   Injectable 0.5 milliGRAM(s) IV Push at bedtime PRN Anxiety  ondansetron Injectable 4 milliGRAM(s) IV Push every 8 hours PRN Nausea and/or Vomiting      Poole:	  [ x] None	[ ] Daily Poole Order Placed	   Indication:	  [ ] Strict I and O's    [ ] Obstruction     [ ] Incontinence + Stage 3 or 4 Decubitus  Central Line:  [ ] None	   [ x]  Medication / TPN Administration     [ ] No Peripheral IV        S: No new issues/events overnight, still with some nausea but no SOB    O: ICU Vital Signs Last 24 Hrs  T(F): 98.4 (11-03-23 @ 05:18), Max: 98.4 (11-03-23 @ 01:07)  HR: 89 (11-03-23 @ 10:00) (64 - 89)  BP: 112/58 (11-03-23 @ 10:00) (109/50 - 138/70)  BP(mean): 84 (11-03-23 @ 10:00) (71 - 92)  ABP: --  RR: 17 (11-03-23 @ 10:00) (14 - 27)  SpO2: 95% (11-03-23 @ 10:00) (93% - 97%)    PHYSICAL EXAM:   Neurological: AAOx3, CNII-XII intact,  strength 5/5 b/l  ENT: mucus membrane moist  Cardiovascular: RRR  Respiratory: CTA  Gastrointestinal: soft, wound manager with grayish output. Scant output from JOYCE, Perc cony bilious drainage.   Extremities: warm, no dependent edema, right PICC site no erythema, no pus.   Vascular: no cyanosis/erythema  Skin: no rashes  MSK: no joint swelling.     LABS:    11-03    134<L>  |  104  |  26<H>  ----------------------------<  121<H>  3.8   |  23  |  1.09    Ca    8.1<L>      03 Nov 2023 05:30  Phos  3.3     11-03  Mg     2.1     11-03    TPro  7.3  /  Alb  1.9<L>  /  TBili  4.9<H>  /  DBili  3.5<H>  /  AST  100<H>  /  ALT  81<H>  /  AlkPhos  130<H>  11-03  LIVER FUNCTIONS - ( 03 Nov 2023 05:30 )  Alb: 1.9 g/dL / Pro: 7.3 g/dL / ALK PHOS: 130 U/L / ALT: 81 U/L / AST: 100 U/L / GGT: x                               7.3    6.34  )-----------( 173      ( 02 Nov 2023 05:30 )             23.4   PT/INR - ( 03 Nov 2023 08:07 )   PT: 14.0 sec;   INR: 1.24          PTT - ( 03 Nov 2023 08:07 )  PTT:39.8 sec  Urinalysis Basic - ( 03 Nov 2023 05:30 )    Color: x / Appearance: x / SG: x / pH: x  Gluc: 121 mg/dL / Ketone: x  / Bili: x / Urobili: x   Blood: x / Protein: x / Nitrite: x   Leuk Esterase: x / RBC: x / WBC x   Sq Epi: x / Non Sq Epi: x / Bacteria: x    CAPILLARY BLOOD GLUCOSE        MEDICATIONS  (STANDING):  chlorhexidine 0.12% Liquid 15 milliLiter(s) Swish and Spit two times a day  chlorhexidine 2% Cloths 1 Application(s) Topical <User Schedule>  cholestyramine Powder (Sugar-Free) 4 Gram(s) Oral daily  diphenoxylate/atropine 2 Tablet(s) Oral every 6 hours  glucagon  Injectable 1 milliGRAM(s) IntraMuscular once  heparin   Injectable 5000 Unit(s) SubCutaneous every 8 hours  influenza  Vaccine (HIGH DOSE) 0.7 milliLiter(s) IntraMuscular once  levothyroxine Injectable 40 MICROGram(s) IV Push at bedtime  lipid, fat emulsion (Fish Oil and Plant Based) 20% Infusion 1.2 Gm/kG/Day (41.67 mL/Hr) IV Continuous <Continuous>  loperamide 4 milliGRAM(s) Oral every 6 hours  metoprolol tartrate Injectable 5 milliGRAM(s) IV Push every 6 hours  nystatin Powder 1 Application(s) Topical three times a day  opium Tincture 6 milliGRAM(s) Oral every 6 hours  Parenteral Nutrition - Adult 1 Each TPN Continuous <Continuous>  Parenteral Nutrition - Adult 1 Each TPN Continuous <Continuous>  ursodiol Capsule 300 milliGRAM(s) Oral every 8 hours  venlafaxine XR. 150 milliGRAM(s) Oral two times a day    MEDICATIONS  (PRN):  LORazepam   Injectable 0.5 milliGRAM(s) IV Push at bedtime PRN Anxiety  ondansetron Injectable 4 milliGRAM(s) IV Push every 8 hours PRN Nausea and/or Vomiting      Poole:	  [ x] None	[ ] Daily Poole Order Placed	   Indication:	  [ ] Strict I and O's    [ ] Obstruction     [ ] Incontinence + Stage 3 or 4 Decubitus  Central Line:  [ ] None	   [ x]  Medication / TPN Administration     [ ] No Peripheral IV

## 2023-11-03 NOTE — ADVANCED PRACTICE NURSE CONSULT - ASSESSMENT
Remains on TPN, eating egg and cottage cheese. Fistula output decreased. Feeling well.    Abdomen:  wound continues to contract with 100% red granulation tissue and new epithelium to edges. Stomatized fistula at 2 o'clock with thin light brown effluent. Periwound skin is intact without irritation. RLQ ileostomy stoma retracted, small less than 1 inch ovoid, peristomal skin intact. Stoma intubated with red rubber catheter.    Skin thoroughly cleansed.  Vashe soak applied to wound bed.  Cavilon No Sting Barrier film applied to periwound, Adapt ostomy rings, stoma paste applied around wound and within creases.  Entire area pouched with an [employer] wound manager # [id number].   2 piece 1 3/4" pouch with Adapt ring applied to RLQ Ileostomy.    Right heel wound resolved.

## 2023-11-03 NOTE — PROGRESS NOTE ADULT - SUBJECTIVE AND OBJECTIVE BOX
Pt seen and examined at bedside and discussed with SICU staff. Attending coverage for Dr. Peterson.  AC started with lovenox today  Pt reports no further chills since PICC line removed.  Appropriate nature of drainage outputs. Nontender on exam.  AFVSS  Lab work stable  Gattex utility to be discussed in a multidisciplinary format.  Monitor renal function with Lovenox use.

## 2023-11-03 NOTE — PROGRESS NOTE ADULT - SUBJECTIVE AND OBJECTIVE BOX
SUBJECTIVE:  Doing well this AM. NAEON. Denies n/v. Denies cp/sob. Pain is well controlled. Ambulating as tolerated. Tolerating diet.    MEDICATIONS  (STANDING):  chlorhexidine 2% Cloths 1 Application(s) Topical <User Schedule>  cholestyramine Powder (Sugar-Free) 4 Gram(s) Oral daily  diphenoxylate/atropine 2 Tablet(s) Oral every 6 hours  glucagon  Injectable 1 milliGRAM(s) IntraMuscular once  heparin   Injectable 5000 Unit(s) SubCutaneous every 8 hours  influenza  Vaccine (HIGH DOSE) 0.7 milliLiter(s) IntraMuscular once  levothyroxine Injectable 40 MICROGram(s) IV Push at bedtime  loperamide 4 milliGRAM(s) Oral every 6 hours  metoprolol tartrate Injectable 5 milliGRAM(s) IV Push every 6 hours  nystatin Powder 1 Application(s) Topical three times a day  opium Tincture 6 milliGRAM(s) Oral every 6 hours  Parenteral Nutrition - Adult 1 Each TPN Continuous <Continuous>  potassium chloride  10 mEq/100 mL IVPB 10 milliEquivalent(s) IV Intermittent once  ursodiol Capsule 300 milliGRAM(s) Oral every 8 hours  venlafaxine XR. 150 milliGRAM(s) Oral two times a day    MEDICATIONS  (PRN):  LORazepam   Injectable 0.5 milliGRAM(s) IV Push at bedtime PRN Anxiety  ondansetron Injectable 4 milliGRAM(s) IV Push every 8 hours PRN Nausea and/or Vomiting      Vital Signs Last 24 Hrs  T(C): 36.9 (03 Nov 2023 05:18), Max: 36.9 (03 Nov 2023 01:07)  T(F): 98.4 (03 Nov 2023 05:18), Max: 98.4 (03 Nov 2023 01:07)  HR: 88 (03 Nov 2023 05:00) (64 - 88)  BP: 109/50 (03 Nov 2023 05:00) (98/57 - 138/70)  BP(mean): 71 (03 Nov 2023 05:00) (71 - 92)  RR: 17 (03 Nov 2023 05:00) (13 - 27)  SpO2: 95% (03 Nov 2023 05:00) (93% - 97%)    Parameters below as of 03 Nov 2023 05:00  Patient On (Oxygen Delivery Method): room air        Physical Exam:  General: NAD, resting comfortably in bed  Pulmonary: Nonlabored breathing, no respiratory distress  Cardiovascular: NSR  Abdominal: soft, NT/ND, wound manager in place, ileostomy in place, Perc coyn in place  Extremities: WWP, normal strength  Neuro: A/O x 3, CNs II-XII grossly intact, no focal deficits    I&O's Summary    02 Nov 2023 07:01  -  03 Nov 2023 07:00  --------------------------------------------------------  IN: 3605.4 mL / OUT: 2475 mL / NET: 1130.4 mL        LABS:                        7.3    6.34  )-----------( 173      ( 02 Nov 2023 05:30 )             23.4     11-03    134<L>  |  104  |  26<H>  ----------------------------<  121<H>  3.8   |  23  |  1.09    Ca    8.1<L>      03 Nov 2023 05:30  Phos  3.3     11-03  Mg     2.1     11-03    TPro  7.3  /  Alb  1.9<L>  /  TBili  4.9<H>  /  DBili  3.5<H>  /  AST  100<H>  /  ALT  81<H>  /  AlkPhos  130<H>  11-03    PT/INR - ( 02 Nov 2023 05:30 )   PT: 14.3 sec;   INR: 1.26          PTT - ( 02 Nov 2023 05:30 )  PTT:34.6 sec  Urinalysis Basic - ( 03 Nov 2023 05:30 )    Color: x / Appearance: x / SG: x / pH: x  Gluc: 121 mg/dL / Ketone: x  / Bili: x / Urobili: x   Blood: x / Protein: x / Nitrite: x   Leuk Esterase: x / RBC: x / WBC x   Sq Epi: x / Non Sq Epi: x / Bacteria: x      LIVER FUNCTIONS - ( 03 Nov 2023 05:30 )  Alb: 1.9 g/dL / Pro: 7.3 g/dL / ALK PHOS: 130 U/L / ALT: 81 U/L / AST: 100 U/L / GGT: x

## 2023-11-04 NOTE — PROGRESS NOTE ADULT - ASSESSMENT
78M with history of Crohn's, AFib/Flutter s/p DCCVs on amiodarone, remote ileocectomy and open appendectomy. Admitted (6/23) for SBO vs Crohns flare, s/p NGT decompression and s/p lap TRE converted to open TRE, SBR x 3, left in discontinuity with abthera vac on (6/27), RTOR for ileocolic resection, small bowel anastomosis, and abdominal wall closure on (6/28), c/b fluid collection s/p IR aspiration of perihepatic fluid on (7/3), c/b wound dehiscence s/p RTOR exlap, washout, ileocolic resection with end ileostomy, blow hole colostomy, red rubber from ileostomy to small bowel anastomosis; vicryl bridging mesh on (7/5) transferred to SICU postoperatively for hemodynamic monitoring, with hospital course complicated by periods of AMS most recently on 9/1 and associated with oliguria, severe ELVI and hyponatremia, which resolved but stepped back up for to SICU on 9/10 for acute AMS, intermittent hypoglycemia, AFib with RVR. Percutaneous Cholecystostomy placed on 9/11 for enlarged GB, PCT capped 10/5, and connected to drainage bag 10/25.      -Strict I/O, monitor output  -IR will continue to follow

## 2023-11-04 NOTE — PROGRESS NOTE ADULT - SUBJECTIVE AND OBJECTIVE BOX
Percutaneous cholecystostomy tube with 575 cc bilious output past 24 hr, previously 590 cc. Flushed easily with 5cc NS. Dressing CDI.

## 2023-11-04 NOTE — PROGRESS NOTE ADULT - SUBJECTIVE AND OBJECTIVE BOX
Overnight events:       POD#    SUBJECTIVE:      MEDICATIONS  (STANDING):  chlorhexidine 0.12% Liquid 15 milliLiter(s) Swish and Spit two times a day  chlorhexidine 2% Cloths 1 Application(s) Topical <User Schedule>  cholestyramine Powder (Sugar-Free) 4 Gram(s) Oral daily  diphenoxylate/atropine 2 Tablet(s) Oral every 6 hours  enoxaparin Injectable 90 milliGRAM(s) SubCutaneous every 12 hours  glucagon  Injectable 1 milliGRAM(s) IntraMuscular once  influenza  Vaccine (HIGH DOSE) 0.7 milliLiter(s) IntraMuscular once  levothyroxine Injectable 40 MICROGram(s) IV Push at bedtime  loperamide 4 milliGRAM(s) Oral every 6 hours  metoprolol tartrate Injectable 5 milliGRAM(s) IV Push every 6 hours  nystatin Powder 1 Application(s) Topical three times a day  Omegaven (fish oil triglycerides) 40 Gram(s) 40 Gram(s) (33.33 mL/Hr) IV Continuous <Continuous>  opium Tincture 6 milliGRAM(s) Oral every 6 hours  Parenteral Nutrition - Adult 1 Each TPN Continuous <Continuous>  ursodiol Capsule 300 milliGRAM(s) Oral every 8 hours  venlafaxine XR. 150 milliGRAM(s) Oral two times a day    MEDICATIONS  (PRN):  LORazepam   Injectable 0.5 milliGRAM(s) IV Push at bedtime PRN Anxiety  ondansetron Injectable 4 milliGRAM(s) IV Push every 8 hours PRN Nausea and/or Vomiting      Vital Signs Last 24 Hrs  T(C): 36.6 (04 Nov 2023 00:06), Max: 36.9 (03 Nov 2023 12:00)  T(F): 97.8 (04 Nov 2023 00:06), Max: 98.4 (03 Nov 2023 12:00)  HR: 81 (04 Nov 2023 04:00) (70 - 95)  BP: 150/65 (04 Nov 2023 04:00) (112/58 - 150/65)  BP(mean): 94 (04 Nov 2023 04:00) (80 - 94)  RR: 23 (04 Nov 2023 04:00) (15 - 25)  SpO2: 95% (04 Nov 2023 04:00) (93% - 98%)    Parameters below as of 04 Nov 2023 04:00  Patient On (Oxygen Delivery Method): room air        Physical Exam:  General: NAD, resting comfortably in bed  Pulmonary: Nonlabored breathing, no respiratory distress  Cardiovascular: NSR  Abdominal: soft, NT/ND  Extremities: WWP, normal strength  Neuro: A/O x 3, CNs II-XII grossly intact, no focal deficits, normal motor/sensation  Pulses: palpable distal pulses    I&O's Summary    02 Nov 2023 07:01  -  03 Nov 2023 07:00  --------------------------------------------------------  IN: 3705.4 mL / OUT: 2475 mL / NET: 1230.4 mL    03 Nov 2023 07:01  -  04 Nov 2023 06:04  --------------------------------------------------------  IN: 3333 mL / OUT: 2547 mL / NET: 786 mL        LABS:    11-03    134<L>  |  104  |  26<H>  ----------------------------<  121<H>  3.8   |  23  |  1.09    Ca    8.1<L>      03 Nov 2023 05:30  Phos  3.3     11-03  Mg     2.1     11-03    TPro  7.3  /  Alb  1.9<L>  /  TBili  4.9<H>  /  DBili  3.5<H>  /  AST  100<H>  /  ALT  81<H>  /  AlkPhos  130<H>  11-03    PT/INR - ( 03 Nov 2023 08:07 )   PT: 14.0 sec;   INR: 1.24          PTT - ( 03 Nov 2023 08:07 )  PTT:39.8 sec  Urinalysis Basic - ( 03 Nov 2023 05:30 )    Color: x / Appearance: x / SG: x / pH: x  Gluc: 121 mg/dL / Ketone: x  / Bili: x / Urobili: x   Blood: x / Protein: x / Nitrite: x   Leuk Esterase: x / RBC: x / WBC x   Sq Epi: x / Non Sq Epi: x / Bacteria: x      CAPILLARY BLOOD GLUCOSE        LIVER FUNCTIONS - ( 03 Nov 2023 05:30 )  Alb: 1.9 g/dL / Pro: 7.3 g/dL / ALK PHOS: 130 U/L / ALT: 81 U/L / AST: 100 U/L / GGT: x             RADIOLOGY & ADDITIONAL STUDIES:   Overnight events: No acute overnight events.      MEDICATIONS  (STANDING):  chlorhexidine 0.12% Liquid 15 milliLiter(s) Swish and Spit two times a day  chlorhexidine 2% Cloths 1 Application(s) Topical <User Schedule>  cholestyramine Powder (Sugar-Free) 4 Gram(s) Oral daily  diphenoxylate/atropine 2 Tablet(s) Oral every 6 hours  enoxaparin Injectable 90 milliGRAM(s) SubCutaneous every 12 hours  glucagon  Injectable 1 milliGRAM(s) IntraMuscular once  influenza  Vaccine (HIGH DOSE) 0.7 milliLiter(s) IntraMuscular once  levothyroxine Injectable 40 MICROGram(s) IV Push at bedtime  loperamide 4 milliGRAM(s) Oral every 6 hours  metoprolol tartrate Injectable 5 milliGRAM(s) IV Push every 6 hours  nystatin Powder 1 Application(s) Topical three times a day  Omegaven (fish oil triglycerides) 40 Gram(s) 40 Gram(s) (33.33 mL/Hr) IV Continuous <Continuous>  opium Tincture 6 milliGRAM(s) Oral every 6 hours  Parenteral Nutrition - Adult 1 Each TPN Continuous <Continuous>  ursodiol Capsule 300 milliGRAM(s) Oral every 8 hours  venlafaxine XR. 150 milliGRAM(s) Oral two times a day    MEDICATIONS  (PRN):  LORazepam   Injectable 0.5 milliGRAM(s) IV Push at bedtime PRN Anxiety  ondansetron Injectable 4 milliGRAM(s) IV Push every 8 hours PRN Nausea and/or Vomiting      Vital Signs Last 24 Hrs  T(C): 36.6 (04 Nov 2023 00:06), Max: 36.9 (03 Nov 2023 12:00)  T(F): 97.8 (04 Nov 2023 00:06), Max: 98.4 (03 Nov 2023 12:00)  HR: 81 (04 Nov 2023 04:00) (70 - 95)  BP: 150/65 (04 Nov 2023 04:00) (112/58 - 150/65)  BP(mean): 94 (04 Nov 2023 04:00) (80 - 94)  RR: 23 (04 Nov 2023 04:00) (15 - 25)  SpO2: 95% (04 Nov 2023 04:00) (93% - 98%)    Parameters below as of 04 Nov 2023 04:00  Patient On (Oxygen Delivery Method): room air    I&O's Summary    02 Nov 2023 07:01  -  03 Nov 2023 07:00  --------------------------------------------------------  IN: 3705.4 mL / OUT: 2475 mL / NET: 1230.4 mL    03 Nov 2023 07:01  -  04 Nov 2023 06:04  --------------------------------------------------------  IN: 3333 mL / OUT: 2547 mL / NET: 786 mL        LABS:    11-03    134<L>  |  104  |  26<H>  ----------------------------<  121<H>  3.8   |  23  |  1.09    Ca    8.1<L>      03 Nov 2023 05:30  Phos  3.3     11-03  Mg     2.1     11-03    TPro  7.3  /  Alb  1.9<L>  /  TBili  4.9<H>  /  DBili  3.5<H>  /  AST  100<H>  /  ALT  81<H>  /  AlkPhos  130<H>  11-03    PT/INR - ( 03 Nov 2023 08:07 )   PT: 14.0 sec;   INR: 1.24          PTT - ( 03 Nov 2023 08:07 )  PTT:39.8 sec  Urinalysis Basic - ( 03 Nov 2023 05:30 )    Color: x / Appearance: x / SG: x / pH: x  Gluc: 121 mg/dL / Ketone: x  / Bili: x / Urobili: x   Blood: x / Protein: x / Nitrite: x   Leuk Esterase: x / RBC: x / WBC x   Sq Epi: x / Non Sq Epi: x / Bacteria: x      CAPILLARY BLOOD GLUCOSE        LIVER FUNCTIONS - ( 03 Nov 2023 05:30 )  Alb: 1.9 g/dL / Pro: 7.3 g/dL / ALK PHOS: 130 U/L / ALT: 81 U/L / AST: 100 U/L / GGT: x             RADIOLOGY & ADDITIONAL STUDIES:

## 2023-11-04 NOTE — PROGRESS NOTE ADULT - ASSESSMENT
78M w PMH Crohn's, AFib/Flutter s/p DCCVs on amiodarone, remote ileocectomy and open appendectomy. Admitted (6/23) for SBO vs Crohns flare, s/p NGT decompression and s/p lap converted to open TRE, SBR x 3, left in discontinuity with abthera vac on (6/27), RTOR for ileocolic resection, small bowel anastomosis, and abdominal wall closure on (6/28), c/b fluid collection s/p IR aspiration of perihepatic fluid on (7/3), c/b wound dehiscence s/p RTOR exlap, washout, ileocolic resection with end ileostomy, blow hole colostomy, red rubber from ileostomy to small bowel anastomosis; vicryl bridging mesh on (7/5) transferred to SICU postoperatively for hemodynamic monitoring, with hospital course complicated by periods of severe ELVI and hyponatremia, which resolved but stepped back up for to SICU on (9/10) for acute AMS, intermittent hypoglycemia, AFib with RVR. Percutaneous Cholecystostomy placed on (9/11) for enlarged GB, PCT capped 10/5 and uncapped 10/25 for hyperbilirubinemia. right brachial DVT, left basillic and cephalic superficial thrombus on duplex 11/2.     NEURO: AMS (resolved), EEG neg. Hx of depression - cont Effexor . Cont Melatonin prn Insomnia, Cont Zofran. Lorazepam prn. Generalized fatigue and sweats while ambulating-- RVP/Covid neg, orthostatics BP wnl.   HEENT: Cepacol PRN  CV: Hx Afib/flutter: s/p DCCV,  Amio held for Increased LFT'S. Continue Metoprolol 5q6. Hx HLD: Lipitor Held given high LFTs. TTE  (7/18) - PASP 64mmHg, EF 65-70%,  Hx HTN -  holding Norvasc 10qd. Holding Losartan. Cont Lopressor 5 q6.   PULM: no resp distress on room air.   GI/FEN: High protein diet w/ bread. Cont Ensure max x1/day. Cont TPN/ lipids - started Omegavan to reduce cholecystasis:  (2.3 L/day volume) for high ostomy and ECF output: Cont Octreotide in TPN, Imodium, Lomotil, Tincture of Opium, Cholestyramine 10/18. Transaminitis of unknown origin, now stable. s/p Perc Choley on 9/11 - tube uncapped 10/25 for worsening hyperbilirubinemia. MRCP done as per Hepatology started on ursidiol, cont PPI. monitor drainage from fistula, bolus as needed to keep I&O even. GI following, considering Gattex.  : Voids.   ENDO: No need to check ISS. Hx hypothyroid: IV Synthroid, TSH wnl on 10/23  ID: Bld Cx's NGTD. As per Hepatology will get Fungal Bld cx's.  Currently off abx. Cont Nystatin powder // PREVIOUSLY had C. tertium, Lactobacillis from his IR cx 7/3, and candida albicans, lactobacillus, vanc sensitive E faecium, vanc resistant E gallinarum, vanc resistant E casseliflavus, lactobacillus from his OR cx 7/5. Completed course of abx with Imipenem (9/10-9/15). Imipenem (8/26--) imipenem (6/30-7/12, 7/23-7/30), Dapto (6/30-7/5 and 7/23-7/24). Empiric dapto (8/23-24) and cefepime (8/23-24).   PPX: SCDs, Therapeutic lovenox 2/2 R brachial vein DVT (11/3 - )  LINES: PIVs, RUE PICC: (11/1-) DC'd: LUE PICC (9/1-11/1 )  WOUNDS/DRAINS: Abdominal wound and fistula with wound manager in place dressing change T & F. RLQ Ostomy. JOYCE. IR perc cony.   PT: Ambulate as tolerated   DISPO: SICU due to complicated hospital course. but will do vitals q4hrs (hold overnight), and CBC/BMP QD in setting of high fistula output.

## 2023-11-04 NOTE — PROGRESS NOTE ADULT - SUBJECTIVE AND OBJECTIVE BOX
SICU Progress Note    ON:  NAEO      SUBJECTIVE: Pt seen and examined at bedside this am by ICU team. Patient is lying comfortably in bed with no complaints. States nausea improving. States he has been tolerating eggs and toast. Reports he has been ambulating.       MEDICATIONS  (STANDING):  chlorhexidine 0.12% Liquid 15 milliLiter(s) Swish and Spit two times a day  chlorhexidine 2% Cloths 1 Application(s) Topical <User Schedule>  cholestyramine Powder (Sugar-Free) 4 Gram(s) Oral daily  diphenoxylate/atropine 2 Tablet(s) Oral every 6 hours  enoxaparin Injectable 90 milliGRAM(s) SubCutaneous every 12 hours  glucagon  Injectable 1 milliGRAM(s) IntraMuscular once  influenza  Vaccine (HIGH DOSE) 0.7 milliLiter(s) IntraMuscular once  levothyroxine Injectable 40 MICROGram(s) IV Push at bedtime  loperamide 4 milliGRAM(s) Oral every 6 hours  metoprolol tartrate Injectable 5 milliGRAM(s) IV Push every 6 hours  nystatin Powder 1 Application(s) Topical three times a day  Omegaven (fish oil triglycerides) 40 Gram(s) 40 Gram(s) (33.33 mL/Hr) IV Continuous <Continuous>  opium Tincture 6 milliGRAM(s) Oral every 6 hours  Parenteral Nutrition - Adult 1 Each TPN Continuous <Continuous>  Parenteral Nutrition - Adult 1 Each TPN Continuous <Continuous>  ursodiol Capsule 300 milliGRAM(s) Oral every 8 hours  venlafaxine XR. 150 milliGRAM(s) Oral two times a day    MEDICATIONS  (PRN):  LORazepam   Injectable 0.5 milliGRAM(s) IV Push at bedtime PRN Anxiety  ondansetron Injectable 4 milliGRAM(s) IV Push every 8 hours PRN Nausea and/or Vomiting      Drips: TPN, Lipids    ICU Vital Signs Last 24 Hrs  T(C): 36.4 (04 Nov 2023 07:25), Max: 36.9 (03 Nov 2023 12:00)  T(F): 97.6 (04 Nov 2023 07:25), Max: 98.4 (03 Nov 2023 12:00)  HR: 86 (04 Nov 2023 09:00) (70 - 95)  BP: 139/67 (04 Nov 2023 09:00) (115/68 - 150/65)  BP(mean): 96 (04 Nov 2023 09:00) (80 - 96)  RR: 17 (04 Nov 2023 09:00) (15 - 25)  SpO2: 98% (04 Nov 2023 09:00) (93% - 98%)    O2 Parameters below as of 04 Nov 2023 09:00  Patient On (Oxygen Delivery Method): room air            PHYSICAL EXAM:   Neurological: AAOx3, CNII-XII intact,  strength 5/5 b/l  ENT: mucus membrane moist  Cardiovascular: RRR  Respiratory: CTA  Gastrointestinal: soft, wound manager with grayish output. Scant output from JOYCE, Perc cony bilious drainage. Ostomy noted with red rubber  Extremities: warm, no dependent edema, R PICC site no erythema, no pus.   Vascular: no cyanosis/erythema  Skin: no rashes  MSK: no joint swelling.     Lines/tubes/drains: R PICC      I&O's Summary    03 Nov 2023 07:01  -  04 Nov 2023 07:00  --------------------------------------------------------  IN: 3528 mL / OUT: 3022 mL / NET: 506 mL    04 Nov 2023 08:01  -  04 Nov 2023 10:38  --------------------------------------------------------  IN: 270 mL / OUT: 175 mL / NET: 95 mL        LABS:    11-03    134<L>  |  104  |  26<H>  ----------------------------<  121<H>  3.8   |  23  |  1.09    Ca    8.1<L>      03 Nov 2023 05:30  Phos  3.3     11-03  Mg     2.1     11-03    TPro  7.3  /  Alb  1.9<L>  /  TBili  4.9<H>  /  DBili  3.5<H>  /  AST  100<H>  /  ALT  81<H>  /  AlkPhos  130<H>  11-03    PT/INR - ( 03 Nov 2023 08:07 )   PT: 14.0 sec;   INR: 1.24          PTT - ( 03 Nov 2023 08:07 )  PTT:39.8 sec  Urinalysis Basic - ( 03 Nov 2023 05:30 )    Color: x / Appearance: x / SG: x / pH: x  Gluc: 121 mg/dL / Ketone: x  / Bili: x / Urobili: x   Blood: x / Protein: x / Nitrite: x   Leuk Esterase: x / RBC: x / WBC x   Sq Epi: x / Non Sq Epi: x / Bacteria: x      CAPILLARY BLOOD GLUCOSE        LIVER FUNCTIONS - ( 03 Nov 2023 05:30 )  Alb: 1.9 g/dL / Pro: 7.3 g/dL / ALK PHOS: 130 U/L / ALT: 81 U/L / AST: 100 U/L / GGT: x             Cultures:Culture Results:   Testing in progress (11-03 @ 04:51)      RADIOLOGY & ADDITIONAL STUDIES:     SUBJECTIVE:  Herminia Tsang is a 28 year old female who presents for follow-up of attention deficit disorder.    She was started on 30 mg Vyvanse daily for new diagnosis of ADD as confirmed by psychotherapy eval. There was also some mild anxiety thought to be related to ADD. She is tolerating the medication well. Initially she was having some problems with sleep when taking the medication at 9:00 daily. Now she takes the medication at 6 AM and has no trouble sleeping at night. She denies any other side effects no chest pain, no shortness of breath, no palpitations, no nausea, no decrease in appetite, headaches, and no weight loss    Herminia reports her attention and focus is much better at work with the medication. She is not having to write down things as much as she is not as forgetful. She feels more productive at work. Her coworker has also noticed an improvement.     Also has noticed improvements at home with completing tasks. Her  has also noticed a positive improvement.     She has limited her caffeine now that she is taking a stimulant.        YEN-7:  1. Felling nervous anxious or on edge: 1  2. Not being able to stop or control worryin  3. Worrying too much about different things: 0  4. Trouble relaxin  5. Being so restless that it is hard to sit still: 0  6. Becoming easily annoyed or irritable: 0  7. Feeling afraid as if something awful might happen: 0    0=Not at all sure  1=Several days  2=Over half the days  3=Nearly every day    How difficult have these problems made it for you to do your work, take care of things at home, or get along with other people? Not difficult    Scorin Mild Anxiety  10  Moderate Anxiety  15 Severe Anxiety  ?10 Possibly diagnosis of YEN; correlate clinically.      Outpatient Prescriptions Marked as Taking for the 17 encounter (Office Visit) with Lisa Peres NP   Medication Sig Dispense Refill   • lisdexamfetamine (VYVANSE) 30 MG capsule Take 1  capsule by mouth every morning. 30 capsule 0   • pseudoephedrine (SUDAFED) 30 MG tablet Take 30 mg by mouth as needed for Congestion.         ALLERGIES:  No Known Allergies       Social History     Social History   • Marital status:      Spouse name: N/A   • Number of children: N/A   • Years of education: N/A     Occupational History   •  [employer]     Social History Main Topics   • Smoking status: Never Smoker   • Smokeless tobacco: Never Used   • Alcohol use 1.5 oz/week     3 Standard drinks or equivalent per week      Comment: socially   • Drug use: No   • Sexual activity: Yes     Partners: Male     Birth control/ protection: Condom     Other Topics Concern   • None     Social History Narrative    Exercise: works out 5-7 days a week, doing running, dance, yoga, weight lifting, spin    Oct 2015: engaged       I have reviewed the patient's past medical, surgical, and family history, updating these as appropriate.  See Histories section of the EMR for a display of this information.     REVIEW OF SYSTEMS:   Negative for HEENT, Cardiac, pulmonary, gastrointestinal, genitourinary, neurologic, endocrinology, psychiatric except as noted in History of Present Illness.       Physical:    Visit Vitals  /70   Pulse 72   Temp 97.2 °F (36.2 °C) (Tympanic)   Resp 18   Ht 5' 6.25\" (1.683 m)   Wt 73 kg   LMP 06/20/2017   SpO2 100%   BMI 25.79 kg/m²     General: Well developed, well nourished, alert & oriented x3, 28 year old female in no acute distress. Patient is sitting quietly in the room, and able to answer questions without difficulties.  Head: Normocephalic, atraumatic.   Lungs: Clear to auscultation, no rales, rhonchi, retraction, or wheezes.  Heart: Regular rate and rhythm without murmur, no heave or thrill.  Extremities: No edema.  Psychiatric: Alert and oriented x 3. Cooperative. Memory intact. Mood and affect normal.  Skin: Intact, no rashes, excoriations or lesions  noted.      Assessment/PLAN:  1. Attention deficit hyperactivity disorder (ADHD), unspecified ADHD type  - Herminia Tsang is a 28 year old female who presents for follow-up of attention deficit disorder.  - She has noticed good improvement with medication and would like to continue at current dose at this time.  - lisdexamfetamine (VYVANSE) 30 MG capsule; Take 1 capsule by mouth every morning.  Dispense: 30 capsule; Refill: 0  - Advised to take medications as prescribed & watch for allergic reactions and medications side-effects. Discussed that if she becomes pregnant or plans pregnancy she should stop this medication as it can cause low birth weight,  withdrawal, or behavioral problems in childhood if taken during pregnancy.      RETURN:   Follow-up in 2-4 months depending on symptoms.    Herminia verbalizes understanding and is in agreement and has no additional concerns/questions at this time.     Lisa Peres, FNP-BC

## 2023-11-05 NOTE — PROGRESS NOTE ADULT - ASSESSMENT
78 M w/ Crohn's, AFib/Flutter s/p DCCVs on amiodarone, remote ileocectomy and open appendectomy. Admitted (6/23) for SBO vs Crohns flare, s/p NGT decompression and s/p lap TRE converted to open TRE, SBR x 3, left in discontinuity with abthera vac on (6/27), RTOR for ileocolic resection, small bowel anastomosis, and abdominal wall closure on (6/28), c/b fluid collection s/p IR aspiration of perihepatic fluid on (7/3), c/b wound dehiscence s/p RTOR exlap, washout, ileocolic resection with end ileostomy, blow hole colostomy, red rubber from ileostomy to small bowel anastomosis; vicryl bridging mesh on (7/5) transferred to SICU postoperatively for hemodynamic monitoring, with hospital course complicated by periods of AMS most recently on 9/1 and associated with oliguria, severe ELVI and hyponatremia, which resolved but stepped back up for to SICU on 9/10 for acute AMS, intermittent hypoglycemia, AFib with RVR. Percutaneous Cholecystostomy placed on 9/11 for enlarged GB, PCT capped 10/5. 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      - Trend LFTs, keep perc cony uncapped  - OK for high fiber diet  - C/w TPN  - Continue to monitor strict I&Os, bolus as needed for repletion  - Gattex authorization, however, do not start Gattex without multidisciplinary discussion  - Wound manager changes as per wound care nursing staff

## 2023-11-05 NOTE — PROGRESS NOTE ADULT - ASSESSMENT
78M w PMH Crohn's, AFib/Flutter s/p DCCVs on amiodarone, remote ileocectomy and open appendectomy. Admitted (6/23) for SBO vs Crohns flare, s/p NGT decompression and s/p lap converted to open TRE, SBR x 3, left in discontinuity with abthera vac on (6/27), RTOR for ileocolic resection, small bowel anastomosis, and abdominal wall closure on (6/28), c/b fluid collection s/p IR aspiration of perihepatic fluid on (7/3), c/b wound dehiscence s/p RTOR exlap, washout, ileocolic resection with end ileostomy, blow hole colostomy, red rubber from ileostomy to small bowel anastomosis; vicryl bridging mesh on (7/5) transferred to SICU postoperatively for hemodynamic monitoring, with hospital course complicated by periods of severe ELVI and hyponatremia, which resolved but stepped back up for to SICU on (9/10) for acute AMS, intermittent hypoglycemia, AFib with RVR. Percutaneous Cholecystostomy placed on (9/11) for enlarged GB, PCT capped 10/5 and uncapped 10/25 for hyperbilirubinemia. right brachial DVT, left basillic and cephalic superficial thrombus on duplex 11/2.     NEURO: AMS (resolved), EEG neg. Hx of depression - cont Effexor . Cont Melatonin prn Insomnia, Cont Zofran. Lorazepam prn. Generalized fatigue and sweats while ambulating improving-- RVP/Covid neg, orthostatics BP wnl.   HEENT: Cepacol PRN  CV: Hx Afib/flutter: s/p DCCV,  Amio held for Increased LFT'S. Continue Metoprolol 5q6. Hx HLD: Lipitor Held given high LFTs. TTE  (7/18) - PASP 64mmHg, EF 65-70%,  Hx HTN -  holding Norvasc 10qd. Holding Losartan.    PULM: no resp distress on room air.   GI/FEN: High protein diet w/ bread. Cont Ensure max x1/day. Cont TPN/ lipids - started Omegavan to reduce cholecystasis:  (2.3 L/day volume) for high ostomy and ECF output: Cont Octreotide in TPN, Imodium, Lomotil, Tincture of Opium, Cholestyramine 10/18. Transaminitis of unknown origin, now stable. s/p Perc Choley on 9/11 - tube uncapped 10/25 for worsening hyperbilirubinemia. MRCP done as per Hepatology started on ursidiol, cont PPI. monitor drainage from fistula, bolus as needed to keep I&O even. GI following, considering Gattex.  : Voids.   ENDO: No need to check ISS. Hx hypothyroid: IV Synthroid, TSH wnl on 10/23  ID: Bld Cx's NGTD. As per Hepatology will get Fungal Bld cx's.  Currently off abx. Cont Nystatin powder // PREVIOUSLY had C. tertium, Lactobacillis from his IR cx 7/3, and candida albicans, lactobacillus, vanc sensitive E faecium, vanc resistant E gallinarum, vanc resistant E casseliflavus, lactobacillus from his OR cx 7/5. Completed course of abx with Imipenem (9/10-9/15). Imipenem (8/26--) imipenem (6/30-7/12, 7/23-7/30), Dapto (6/30-7/5 and 7/23-7/24). Empiric dapto (8/23-24) and cefepime (8/23-24).   PPX: SCDs, Therapeutic lovenox 2/2 R brachial vein DVT (11/3 - )  LINES: PIVs, RUE PICC: (11/1-) DC'd: LUE PICC (9/1-11/1 )  WOUNDS/DRAINS: Abdominal wound and fistula with wound manager in place dressing change T & F. RLQ Ostomy. JOYCE. IR perc cony.   PT: Ambulate as tolerated   DISPO: SICU due to complicated hospital course. but will do vitals q4hrs (hold overnight), and CBC/BMP QD in setting of high fistula output.

## 2023-11-05 NOTE — PROGRESS NOTE ADULT - SUBJECTIVE AND OBJECTIVE BOX
ON: patient reports noting bleeding on gums, no new complains.      SUBJECTIVE: tolerating diet.     MEDICATIONS  (STANDING):  chlorhexidine 0.12% Liquid 15 milliLiter(s) Swish and Spit two times a day  chlorhexidine 2% Cloths 1 Application(s) Topical <User Schedule>  cholestyramine Powder (Sugar-Free) 4 Gram(s) Oral daily  diphenoxylate/atropine 2 Tablet(s) Oral every 6 hours  enoxaparin Injectable 90 milliGRAM(s) SubCutaneous every 12 hours  glucagon  Injectable 1 milliGRAM(s) IntraMuscular once  influenza  Vaccine (HIGH DOSE) 0.7 milliLiter(s) IntraMuscular once  levothyroxine Injectable 40 MICROGram(s) IV Push at bedtime  loperamide 4 milliGRAM(s) Oral every 6 hours  metoprolol tartrate Injectable 5 milliGRAM(s) IV Push every 6 hours  nystatin Powder 1 Application(s) Topical three times a day  Omegaven (fish oil triglycerides) 40 Gram(s) 40 Gram(s) (33.33 mL/Hr) IV Continuous <Continuous>  Omegaven 10 grams per 100 ml 40 Gram(s) 400 milliLiter(s) (33.33 mL/Hr) IV Continuous <Continuous>  opium Tincture 6 milliGRAM(s) Oral every 6 hours  Parenteral Nutrition - Adult 1 Each TPN Continuous <Continuous>  Parenteral Nutrition - Adult 1 Each TPN Continuous <Continuous>  ursodiol Capsule 300 milliGRAM(s) Oral every 8 hours  venlafaxine XR. 150 milliGRAM(s) Oral two times a day    MEDICATIONS  (PRN):  LORazepam   Injectable 0.5 milliGRAM(s) IV Push at bedtime PRN Anxiety  ondansetron Injectable 4 milliGRAM(s) IV Push every 8 hours PRN Nausea and/or Vomiting      Drips:     ICU Vital Signs Last 24 Hrs  T(C): 36.4 (05 Nov 2023 08:14), Max: 36.7 (05 Nov 2023 06:47)  T(F): 97.5 (05 Nov 2023 08:14), Max: 98 (05 Nov 2023 06:47)  HR: 87 (05 Nov 2023 06:00) (74 - 98)  BP: 148/66 (05 Nov 2023 06:00) (113/58 - 159/72)  BP(mean): 88 (05 Nov 2023 06:00) (81 - 102)  ABP: --  ABP(mean): --  RR: 17 (05 Nov 2023 06:00) (15 - 20)  SpO2: 94% (05 Nov 2023 06:00) (94% - 98%)    O2 Parameters below as of 05 Nov 2023 06:00  Patient On (Oxygen Delivery Method): room air      Physical Exam:  Neurological: AAOx3, CNII-XII intact,  strength 5/5 b/l  ENT: mucus membrane moist  Cardiovascular: RRR  Respiratory: CTA  Gastrointestinal: soft, wound manager with grayish output. Scant output from JOYCE, Perc cony bilious drainage. Ostomy noted with red rubber  Extremities: warm, no dependent edema, R PICC site no erythema, no pus.   Vascular: no cyanosis/erythema  Skin: no rashes  MSK: no joint swelling.     Lines/tubes/drains: R PICC      I&O's Summary    04 Nov 2023 08:01  -  05 Nov 2023 07:00  --------------------------------------------------------  IN: 3547.9 mL / OUT: 3378 mL / NET: 169.9 mL    05 Nov 2023 07:01  -  05 Nov 2023 08:44  --------------------------------------------------------  IN: 135 mL / OUT: 0 mL / NET: 135 mL

## 2023-11-05 NOTE — PROGRESS NOTE ADULT - SUBJECTIVE AND OBJECTIVE BOX
SUBJECTIVE: Patient was seen and examined by chief resident. Patient resting comfortably in bed with no acute complaints.      Vital Signs Last 24 Hrs  T(C): 36.4 (05 Nov 2023 11:58), Max: 36.7 (05 Nov 2023 06:47)  T(F): 97.6 (05 Nov 2023 11:58), Max: 98 (05 Nov 2023 06:47)  HR: 99 (05 Nov 2023 12:08) (74 - 99)  BP: 132/61 (05 Nov 2023 12:08) (113/58 - 154/72)  BP(mean): 88 (05 Nov 2023 12:08) (81 - 103)  RR: 19 (05 Nov 2023 12:08) (15 - 23)  SpO2: 93% (05 Nov 2023 12:08) (93% - 98%)    Parameters below as of 05 Nov 2023 12:08  Patient On (Oxygen Delivery Method): room air        I&O's Summary    04 Nov 2023 08:01  -  05 Nov 2023 07:00  --------------------------------------------------------  IN: 3547.9 mL / OUT: 3378 mL / NET: 169.9 mL    05 Nov 2023 07:01  -  05 Nov 2023 13:04  --------------------------------------------------------  IN: 675 mL / OUT: 900 mL / NET: -225 mL        Physical Exam:  General Appearance: Appears well, NAD  Pulmonary: Nonlabored breathing, no respiratory distress  Cardiovascular: NSR  Abdomen: Soft, NTND, wound manager covering fistula site, minimal output from JOYCE, perc cony w bilious output, ostomy w minimal output  Extremities: WWP, SCD's in place

## 2023-11-06 NOTE — PROGRESS NOTE ADULT - SUBJECTIVE AND OBJECTIVE BOX
SUBJECTIVE:  Doing well this AM. NAEON. Denies n/v. Endorses f/bm in ostomy and wound manager. Denies cp/sob. Pain is well controlled. Ambulating as tolerated. Tolerating diet.    MEDICATIONS  (STANDING):  chlorhexidine 0.12% Liquid 15 milliLiter(s) Swish and Spit two times a day  chlorhexidine 2% Cloths 1 Application(s) Topical <User Schedule>  cholestyramine Powder (Sugar-Free) 4 Gram(s) Oral daily  diphenoxylate/atropine 2 Tablet(s) Oral every 6 hours  enoxaparin Injectable 100 milliGRAM(s) SubCutaneous every 12 hours  glucagon  Injectable 1 milliGRAM(s) IntraMuscular once  influenza  Vaccine (HIGH DOSE) 0.7 milliLiter(s) IntraMuscular once  levothyroxine Injectable 40 MICROGram(s) IV Push at bedtime  loperamide 4 milliGRAM(s) Oral every 6 hours  metoprolol tartrate Injectable 5 milliGRAM(s) IV Push every 6 hours  nystatin Powder 1 Application(s) Topical three times a day  OMEGAVEN 10G  ML 80 Gram(s) 800 milliLiter(s) (66.67 mL/Hr) IV Continuous <Continuous>  Omegaven 10G/100ml 100 Gram(s) 100 Gram(s) (83.33 mL/Hr) IV Continuous <Continuous>  opium Tincture 6 milliGRAM(s) Oral every 6 hours  Parenteral Nutrition - Adult 1 Each TPN Continuous <Continuous>  Parenteral Nutrition - Adult 1 Each TPN Continuous <Continuous>  ursodiol Capsule 300 milliGRAM(s) Oral every 8 hours  venlafaxine XR. 150 milliGRAM(s) Oral two times a day    MEDICATIONS  (PRN):  LORazepam   Injectable 0.5 milliGRAM(s) IV Push at bedtime PRN Anxiety  ondansetron Injectable 4 milliGRAM(s) IV Push every 8 hours PRN Nausea and/or Vomiting      Vital Signs Last 24 Hrs  T(C): 37 (06 Nov 2023 09:17), Max: 37 (06 Nov 2023 09:17)  T(F): 98.6 (06 Nov 2023 09:17), Max: 98.6 (06 Nov 2023 09:17)  HR: 88 (06 Nov 2023 13:15) (77 - 89)  BP: 141/73 (06 Nov 2023 13:15) (130/58 - 144/65)  BP(mean): 101 (06 Nov 2023 13:15) (84 - 101)  RR: 18 (06 Nov 2023 13:15) (13 - 21)  SpO2: 95% (06 Nov 2023 13:15) (93% - 95%)    Parameters below as of 06 Nov 2023 13:15  Patient On (Oxygen Delivery Method): room air        Physical Exam:  General: NAD, resting comfortably in bed  Pulmonary: Nonlabored breathing, no respiratory distress  Cardiovascular: NSR  Abdominal: soft, NT/ND, wound manager in place, ostomy in place, perc cony bilious  Extremities: WWP, normal strength  Neuro: A/O x 3, CNs II-XII grossly intact, no focal deficits    I&O's Summary    05 Nov 2023 07:01  -  06 Nov 2023 07:00  --------------------------------------------------------  IN: 3973.4 mL / OUT: 3171 mL / NET: 802.4 mL    06 Nov 2023 07:01  -  06 Nov 2023 15:34  --------------------------------------------------------  IN: 540 mL / OUT: 900 mL / NET: -360 mL        LABS:                        8.2    6.83  )-----------( 194      ( 06 Nov 2023 13:12 )             26.5     11-06    137  |  105  |  31<H>  ----------------------------<  89  4.0   |  26  |  0.94    Ca    8.0<L>      06 Nov 2023 05:30  Phos  3.6     11-06  Mg     1.9     11-06    TPro  7.2  /  Alb  1.9<L>  /  TBili  4.3<H>  /  DBili  2.9<H>  /  AST  92<H>  /  ALT  71<H>  /  AlkPhos  143<H>  11-06      Urinalysis Basic - ( 06 Nov 2023 05:30 )    Color: x / Appearance: x / SG: x / pH: x  Gluc: 89 mg/dL / Ketone: x  / Bili: x / Urobili: x   Blood: x / Protein: x / Nitrite: x   Leuk Esterase: x / RBC: x / WBC x   Sq Epi: x / Non Sq Epi: x / Bacteria: x      CAPILLARY BLOOD GLUCOSE        LIVER FUNCTIONS - ( 06 Nov 2023 05:30 )  Alb: 1.9 g/dL / Pro: 7.2 g/dL / ALK PHOS: 143 U/L / ALT: 71 U/L / AST: 92 U/L / GGT: x             RADIOLOGY & ADDITIONAL STUDIES:

## 2023-11-06 NOTE — PROGRESS NOTE ADULT - SUBJECTIVE AND OBJECTIVE BOX
ON: AMRITA      SUBJECTIVE: Pt seen and examined at bedside this am by ICU team. Patient is lying comfortably in bed with no complaints. Tolerating diet, pain well controlled with current regimen. Patient denies fever, nausea, vomiting, chest pain, and shortness of breath.     MEDICATIONS  (STANDING):  chlorhexidine 0.12% Liquid 15 milliLiter(s) Swish and Spit two times a day  chlorhexidine 2% Cloths 1 Application(s) Topical <User Schedule>  cholestyramine Powder (Sugar-Free) 4 Gram(s) Oral daily  diphenoxylate/atropine 2 Tablet(s) Oral every 6 hours  enoxaparin Injectable 100 milliGRAM(s) SubCutaneous every 12 hours  glucagon  Injectable 1 milliGRAM(s) IntraMuscular once  influenza  Vaccine (HIGH DOSE) 0.7 milliLiter(s) IntraMuscular once  levothyroxine Injectable 40 MICROGram(s) IV Push at bedtime  loperamide 4 milliGRAM(s) Oral every 6 hours  metoprolol tartrate Injectable 5 milliGRAM(s) IV Push every 6 hours  nystatin Powder 1 Application(s) Topical three times a day  OMEGAVEN 10G  ML 80 Gram(s) 800 milliLiter(s) (66.67 mL/Hr) IV Continuous <Continuous>  Omegaven 10G/100ml 100 Gram(s)   (83.33 mL/Hr) IV Continuous <Continuous>  opium Tincture 6 milliGRAM(s) Oral every 6 hours  Parenteral Nutrition - Adult 1 Each TPN Continuous <Continuous>  Parenteral Nutrition - Adult 1 Each TPN Continuous <Continuous>  ursodiol Capsule 300 milliGRAM(s) Oral every 8 hours  venlafaxine XR. 150 milliGRAM(s) Oral two times a day    MEDICATIONS  (PRN):  LORazepam   Injectable 0.5 milliGRAM(s) IV Push at bedtime PRN Anxiety  ondansetron Injectable 4 milliGRAM(s) IV Push every 8 hours PRN Nausea and/or Vomiting      Drips:     ICU Vital Signs Last 24 Hrs  T(C): 37 (06 Nov 2023 09:17), Max: 37 (06 Nov 2023 09:17)  T(F): 98.6 (06 Nov 2023 09:17), Max: 98.6 (06 Nov 2023 09:17)  HR: 88 (06 Nov 2023 11:17) (77 - 89)  BP: 143/67 (06 Nov 2023 11:17) (130/58 - 144/65)  BP(mean): 97 (06 Nov 2023 11:17) (84 - 97)  ABP: --  ABP(mean): --  RR: 21 (06 Nov 2023 11:17) (13 - 21)  SpO2: 95% (06 Nov 2023 11:17) (93% - 95%)    O2 Parameters below as of 06 Nov 2023 11:17  Patient On (Oxygen Delivery Method): room air            Physical Exam:  General: NAD  HEENT: NC/AT, EOMI, PERRLA, normal hearing, no oral lesions, neck supple w/o LAD  Pulmonary: Nonlabored breathing, no respiratory distress, CTA-B  Cardiovascular: NSR, no murmurs  Abdominal: soft, wound manager with grayish output. Scant output from JOYCE, Perc cony bilious drainage. Ostomy noted with red rubber  Extremities: WWP, 5/5 strength x 4, no clubbing/cyanosis/edema  Neuro: A/O x3, CNs II-XII grossly intact, normal motor/sensation, no focal deficits  Pulses: palpable distal pulses    Vent settings:      I&O's Summary    05 Nov 2023 07:01  -  06 Nov 2023 07:00  --------------------------------------------------------  IN: 3973.4 mL / OUT: 3171 mL / NET: 802.4 mL    06 Nov 2023 07:01  -  06 Nov 2023 15:13  --------------------------------------------------------  IN: 540 mL / OUT: 900 mL / NET: -360 mL        LABS:                        8.2    6.83  )-----------( 194      ( 06 Nov 2023 13:12 )             26.5     11-06    137  |  105  |  31<H>  ----------------------------<  89  4.0   |  26  |  0.94    Ca    8.0<L>      06 Nov 2023 05:30  Phos  3.6     11-06  Mg     1.9     11-06    TPro  7.2  /  Alb  1.9<L>  /  TBili  4.3<H>  /  DBili  2.9<H>  /  AST  92<H>  /  ALT  71<H>  /  AlkPhos  143<H>  11-06      Urinalysis Basic - ( 06 Nov 2023 05:30 )    Color: x / Appearance: x / SG: x / pH: x  Gluc: 89 mg/dL / Ketone: x  / Bili: x / Urobili: x   Blood: x / Protein: x / Nitrite: x   Leuk Esterase: x / RBC: x / WBC x   Sq Epi: x / Non Sq Epi: x / Bacteria: x      CAPILLARY BLOOD GLUCOSE        LIVER FUNCTIONS - ( 06 Nov 2023 05:30 )  Alb: 1.9 g/dL / Pro: 7.2 g/dL / ALK PHOS: 143 U/L / ALT: 71 U/L / AST: 92 U/L / GGT: x             Cultures:    RADIOLOGY & ADDITIONAL STUDIES:

## 2023-11-06 NOTE — PROGRESS NOTE ADULT - ASSESSMENT
78M w PMH Crohn's, AFib/Flutter s/p DCCVs on amiodarone, remote ileocectomy and open appendectomy. Admitted (6/23) for SBO vs Crohns flare, s/p NGT decompression and s/p lap converted to open TRE, SBR x 3, left in discontinuity with abthera vac on (6/27), RTOR for ileocolic resection, small bowel anastomosis, and abdominal wall closure on (6/28), c/b fluid collection s/p IR aspiration of perihepatic fluid on (7/3), c/b wound dehiscence s/p RTOR exlap, washout, ileocolic resection with end ileostomy, blow hole colostomy, red rubber from ileostomy to small bowel anastomosis; vicryl bridging mesh on (7/5) transferred to SICU postoperatively for hemodynamic monitoring, with hospital course complicated by periods of severe ELVI and hyponatremia, which resolved but stepped back up for to SICU on (9/10) for acute AMS, intermittent hypoglycemia, AFib with RVR. Percutaneous Cholecystostomy placed on (9/11) for enlarged GB, PCT capped 10/5 and uncapped 10/25 for hyperbilirubinemia. right brachial DVT, left basillic and cephalic superficial thrombus on duplex 11/2.     NEURO: AMS (resolved), EEG neg. Hx of depression - cont Effexor . Cont Melatonin prn Insomnia, Cont Zofran. Lorazepam prn. Generalized fatigue and sweats while ambulating-- RVP/Covid neg, orthostatics BP wnl.   HENT: Cepacol  CV: Hx Afib/flutter: s/p DCCV,  Amio held for Increased LFT'S. Continue Metoprolol 5q6. Hx HLD: Lipitor Held given high LFTs. TTE  (7/18) - PASP 64mmHg, EF 65-70%,  Hx HTN -  holding Norvasc 10qd. Holding Losartan. Cont Lopressor 5 q6.   PULM: no resp distress on room air.   GI/FEN: High protein diet w/ bread. Cont Ensure max x1/day. Cont TPN/ lipids - continue Omegavan to reduce cholestasis. for high ostomy and ECF output: Cont Octreotide in TPN, Imodium, Lomotil, Tincture of Opium, Cholestyramine 10/18. Transaminitis of unknown origin, stable and marginally downtrending. s/p Perc Choley on 9/11 - tube uncapped 10/25 for worsening hyperbilirubinemia. Continue Ursodiol per hepatology recs, cont PPI. monitor drainage from fistula, bolus as needed to keep I&O even. GI following, considering Gattex.  : Voids.   ENDO: Hx hypothyroid: IV Synthroid, TSH wnl on 10/23  ID: Bld Cx's NGTD. As per Hepatology will get Fungal Bld cx's.  Currently off abx. Cont Nystatin powder // PREVIOUSLY had C. tertium, Lactobacillis from his IR cx 7/3, and candida albicans, lactobacillus, vanc sensitive E faecium, vanc resistant E gallinarum, vanc resistant E casseliflavus, lactobacillus from his OR cx 7/5. Completed course of abx with Imipenem (9/10-9/15). Imipenem (8/26--) imipenem (6/30-7/12, 7/23-7/30), Dapto (6/30-7/5 and 7/23-7/24). Empiric dapto (8/23-24) and cefepime (8/23-24).   PPX: SCDs, Therapeutic lovenox 2/2 R brachial vein DVT (11/3 - )  LINES: PIVs, RUE PICC: (11/1-) DC'd: LUE PICC (9/1-11/1 )  WOUNDS/DRAINS: Abdominal wound and fistula with wound manager in place dressing change T & F. RLQ Ostomy. JOYCE. IR perc cony.   PT: Ambulate as tolerated   DISPO: SICU due to complicated hospital course. but will do vitals q4hrs (hold overnight), and CBC/BMP QD in setting of high fistula output.

## 2023-11-06 NOTE — PROGRESS NOTE ADULT - ATTENDING COMMENTS
Crohn's, AF, s/p SBR, ileocolic resection, end ileostomy with hyperbilirubinemia, anemia  physical as above  transfusing one unit PRBC; start EPO  continue lovenox and metoprolol  continue Omegaven; I explained to him the idea of 100% Fish oil and the possibility that this might help his probable PNAC. The data is mostly in neonates/pediatrics and there are no good studies in his age group but the side effect profile so far is good and it is FDA approved. To watch for bleeding and we are monitoring TGs  bilirubin down on this and the ursodiol  creatinine is lower

## 2023-11-06 NOTE — PROGRESS NOTE ADULT - SUBJECTIVE AND OBJECTIVE BOX
78M with history of Crohn's, AFib/Flutter s/p DCCVs on amiodarone, remote ileocectomy and open appendectomy. Admitted (6/23) for SBO vs Crohns flare, s/p NGT decompression and s/p lap TRE converted to open TRE, SBR x 3, left in discontinuity with abthera vac on (6/27), RTOR for ileocolic resection, small bowel anastomosis, and abdominal wall closure on (6/28), c/b fluid collection s/p IR aspiration of perihepatic fluid on (7/3), c/b wound dehiscence s/p RTOR exlap, washout, ileocolic resection with end ileostomy, blow hole colostomy, red rubber from ileostomy to small bowel anastomosis; vicryl bridging mesh on (7/5) transferred to SICU postoperatively for hemodynamic monitoring, with hospital course complicated by periods of AMS most recently on 9/1 and associated with oliguria, severe ELVI and hyponatremia, which resolved but stepped back up for to SICU on 9/10 for acute AMS, intermittent hypoglycemia, AFib with RVR. Percutaneous Cholecystostomy placed on 9/11 for enlarged GB, PCT capped 10/5, and connected to drainage bag 10/25.    Percutaneous cholecystostomy tube with 605 cc output past 24 hr, previously 705 cc/24h    -Strict I/O, monitor output  -IR will continue to follow

## 2023-11-06 NOTE — PROGRESS NOTE ADULT - ASSESSMENT
78 M w/ Crohn's, AFib/Flutter s/p DCCVs on amiodarone, remote ileocectomy and open appendectomy. Admitted (6/23) for SBO vs Crohns flare, s/p NGT decompression and s/p lap TRE converted to open TRE, SBR x 3, left in discontinuity with abthera vac on (6/27), RTOR for ileocolic resection, small bowel anastomosis, and abdominal wall closure on (6/28), c/b fluid collection s/p IR aspiration of perihepatic fluid on (7/3), c/b wound dehiscence s/p RTOR exlap, washout, ileocolic resection with end ileostomy, blow hole colostomy, red rubber from ileostomy to small bowel anastomosis; vicryl bridging mesh on (7/5) transferred to SICU postoperatively for hemodynamic monitoring, with hospital course complicated by periods of AMS most recently on 9/1 and associated with oliguria, severe ELVI and hyponatremia, which resolved but stepped back up for to SICU on 9/10 for acute AMS, intermittent hypoglycemia, AFib with RVR. Percutaneous Cholecystostomy placed on 9/11 for enlarged GB, PCT capped 10/5. 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    Wound change tomorrow w/ wound care  Rest of care per colorectal and ICU

## 2023-11-06 NOTE — PROGRESS NOTE ADULT - ASSESSMENT
To transfuse given symptomatic anemia  Check iron B12 and folate  Increase solids po given stable ostomy output

## 2023-11-06 NOTE — PROVIDER CONTACT NOTE (CRITICAL VALUE NOTIFICATION) - BACKGROUND
Patient hx of Bowel Obstruction. and upgraded for AMS
Pt after protocol to hyperkalemia become hypoglycemic.
HTN  A fib, Crohn's disease   HLD

## 2023-11-07 NOTE — PROGRESS NOTE ADULT - NS ATTEND AMEND GEN_ALL_CORE FT
Crohn's, AF, s/p SBR, ileocolic resection, end ileostomy, RUE DVT  physical as above  H/H stable after transfusion  continue ursodiol and omegaven  TPN written  add erythropoietin 10,000 units weekly  continue metoprolol and lovenox  rest as above

## 2023-11-07 NOTE — PROGRESS NOTE ADULT - ASSESSMENT
78 M w/ Crohn's, AFib/Flutter s/p DCCVs on amiodarone, remote ileocectomy and open appendectomy. Admitted (6/23) for SBO vs Crohns flare, s/p NGT decompression and s/p lap TRE converted to open TRE, SBR x 3, left in discontinuity with abthera vac on (6/27), RTOR for ileocolic resection, small bowel anastomosis, and abdominal wall closure on (6/28), c/b fluid collection s/p IR aspiration of perihepatic fluid on (7/3), c/b wound dehiscence s/p RTOR exlap, washout, ileocolic resection with end ileostomy, blow hole colostomy, red rubber from ileostomy to small bowel anastomosis; vicryl bridging mesh on (7/5) transferred to SICU postoperatively for hemodynamic monitoring, with hospital course complicated by periods of AMS most recently on 9/1 and associated with oliguria, severe ELVI and hyponatremia, which resolved but stepped back up for to SICU on 9/10 for acute AMS, intermittent hypoglycemia, AFib with RVR. Percutaneous Cholecystostomy placed on 9/11 for enlarged GB, PCT capped 10/5. 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    Wound change today w/ wound care  Rest of care per colorectal and ICU

## 2023-11-07 NOTE — PROGRESS NOTE ADULT - ASSESSMENT
Hct increased post transfusion  Tolerating solids po with no increased output  To cont to slowly increase po and ambulation

## 2023-11-07 NOTE — PROGRESS NOTE ADULT - SUBJECTIVE AND OBJECTIVE BOX
78M with history of Crohn's, AFib/Flutter s/p DCCVs on amiodarone, remote ileocectomy and open appendectomy. Admitted (6/23) for SBO vs Crohns flare, s/p NGT decompression and s/p lap TRE converted to open TRE, SBR x 3, left in discontinuity with abthera vac on (6/27), RTOR for ileocolic resection, small bowel anastomosis, and abdominal wall closure on (6/28), c/b fluid collection s/p IR aspiration of perihepatic fluid on (7/3), c/b wound dehiscence s/p RTOR exlap, washout, ileocolic resection with end ileostomy, blow hole colostomy, red rubber from ileostomy to small bowel anastomosis; vicryl bridging mesh on (7/5) transferred to SICU postoperatively for hemodynamic monitoring, with hospital course complicated by periods of AMS most recently on 9/1 and associated with oliguria, severe ELVI and hyponatremia, which resolved but stepped back up for to SICU on 9/10 for acute AMS, intermittent hypoglycemia, AFib with RVR. Percutaneous Cholecystostomy placed on 9/11 for enlarged GB, PCT capped 10/5, and connected to drainage bag 10/25.    Percutaneous cholecystostomy tube with 705 cc output past 24 hr, previously 605 cc/24h    -Strict I/O, monitor output  -IR will continue to follow

## 2023-11-07 NOTE — PROGRESS NOTE ADULT - SUBJECTIVE AND OBJECTIVE BOX
INTERVAL/OVERNIGHT EVENTS: AMRITA. States he felt better after the transfusion.    SUBJECTIVE: This AM, no complaints. Doing well with current diet regimen. No N/V or CP/SOB. Wound manager is working well.     Neurologic Medications  LORazepam   Injectable 0.5 milliGRAM(s) IV Push at bedtime PRN Anxiety  ondansetron Injectable 4 milliGRAM(s) IV Push every 8 hours PRN Nausea and/or Vomiting  opium Tincture 6 milliGRAM(s) Oral every 6 hours  venlafaxine XR. 150 milliGRAM(s) Oral two times a day    Cardiovascular Medications  metoprolol tartrate Injectable 5 milliGRAM(s) IV Push every 6 hours    Gastrointestinal Medications  diphenoxylate/atropine 2 Tablet(s) Oral every 6 hours  loperamide 4 milliGRAM(s) Oral every 6 hours  Parenteral Nutrition - Adult 1 Each TPN Continuous <Continuous>  ursodiol Capsule 300 milliGRAM(s) Oral every 8 hours    Hematologic/Oncologic Medications  enoxaparin Injectable 100 milliGRAM(s) SubCutaneous every 12 hours  epoetin matt (EPOGEN) Injectable 58511 Unit(s) SubCutaneous every 7 days  influenza  Vaccine (HIGH DOSE) 0.7 milliLiter(s) IntraMuscular once    Endocrine/Metabolic Medications  cholestyramine Powder (Sugar-Free) 4 Gram(s) Oral daily  glucagon  Injectable 1 milliGRAM(s) IntraMuscular once  levothyroxine Injectable 40 MICROGram(s) IV Push at bedtime    Topical/Other Medications  chlorhexidine 0.12% Liquid 15 milliLiter(s) Swish and Spit two times a day  chlorhexidine 2% Cloths 1 Application(s) Topical <User Schedule>  Omegaven 10G/100ml 100 Gram(s) 100 Gram(s) IV Continuous <Continuous>    MEDICATIONS  (PRN):  LORazepam   Injectable 0.5 milliGRAM(s) IV Push at bedtime PRN Anxiety  ondansetron Injectable 4 milliGRAM(s) IV Push every 8 hours PRN Nausea and/or Vomiting    I&O's Detail    06 Nov 2023 07:01  -  07 Nov 2023 07:00  --------------------------------------------------------  IN:    freetext medication - Infusion: 1082.9 mL    Oral Fluid: 200 mL    TPN (Total Parenteral Nutrition): 2635 mL  Total IN: 3917.9 mL    OUT:    Bulb (mL): 0 mL    Drain (mL): 1010 mL    Drain (mL): 705 mL    Drain (mL): 80 mL    Voided (mL): 1725 mL  Total OUT: 3520 mL    Total NET: 397.9 mL    07 Nov 2023 07:01  -  07 Nov 2023 13:06  --------------------------------------------------------  IN:    TPN (Total Parenteral Nutrition): 405 mL  Total IN: 405 mL    OUT:    Drain (mL): 50 mL    Drain (mL): 125 mL    Voided (mL): 225 mL  Total OUT: 400 mL    Total NET: 5 mL    Vital Signs Last 24 Hrs  T(C): 36.6 (07 Nov 2023 05:04), Max: 36.6 (06 Nov 2023 21:53)  T(F): 97.8 (07 Nov 2023 05:04), Max: 97.9 (06 Nov 2023 21:53)  HR: 91 (07 Nov 2023 11:25) (87 - 91)  BP: 143/62 (07 Nov 2023 11:25) (122/58 - 147/65)  BP(mean): 89 (07 Nov 2023 11:25) (84 - 101)  RR: 18 (07 Nov 2023 11:25) (15 - 18)  SpO2: 94% (07 Nov 2023 11:25) (93% - 98%)    Parameters below as of 07 Nov 2023 11:25  Patient On (Oxygen Delivery Method): room air    GENERAL: NAD, resting comfortably in bed, AxOx3  HEENT: NCAT, tongue is dry  C/V: Normal rate, normal peripheral perfusion, no murmurs  PULM: Nonlabored breathing, no respiratory distress, CTA b/l  ABD: Soft, ND, NT, no rebound tenderness, no guarding, wound manager in place w good seal, feculent output.  EXTREM: WWP, no edema, SCDs in place  NEURO: No focal deficits    LABS:                        8.3    6.86  )-----------( 198      ( 07 Nov 2023 05:03 )             26.4     11-06    137  |  105  |  31<H>  ----------------------------<  89  4.0   |  26  |  0.94    Ca    8.0<L>      06 Nov 2023 05:30  Phos  3.6     11-06  Mg     1.9     11-06    TPro  7.2  /  Alb  1.9<L>  /  TBili  4.3<H>  /  DBili  2.9<H>  /  AST  92<H>  /  ALT  71<H>  /  AlkPhos  143<H>  11-06    Urinalysis Basic - ( 06 Nov 2023 05:30 )    Color: x / Appearance: x / SG: x / pH: x  Gluc: 89 mg/dL / Ketone: x  / Bili: x / Urobili: x   Blood: x / Protein: x / Nitrite: x   Leuk Esterase: x / RBC: x / WBC x   Sq Epi: x / Non Sq Epi: x / Bacteria: x    Culture - Fungal, Blood (collected 11-03-23 @ 04:51)  Source: .Blood Blood  Preliminary Report (11-04-23 @ 15:03):    Culture is being performed. Fungal cultures are held for 6 weeks

## 2023-11-07 NOTE — PROGRESS NOTE ADULT - SUBJECTIVE AND OBJECTIVE BOX
SUBJECTIVE:  Doing well this AM. NAEON. Hgb responded appropriately after 1u pRBC. Denies n/v. Endorses f/bm. Denies cp/sob. Ambulating as tolerated. Tolerating diet.    MEDICATIONS  (STANDING):  chlorhexidine 0.12% Liquid 15 milliLiter(s) Swish and Spit two times a day  chlorhexidine 2% Cloths 1 Application(s) Topical <User Schedule>  cholestyramine Powder (Sugar-Free) 4 Gram(s) Oral daily  diphenoxylate/atropine 2 Tablet(s) Oral every 6 hours  enoxaparin Injectable 100 milliGRAM(s) SubCutaneous every 12 hours  epoetin matt (EPOGEN) Injectable 97234 Unit(s) SubCutaneous every 7 days  glucagon  Injectable 1 milliGRAM(s) IntraMuscular once  influenza  Vaccine (HIGH DOSE) 0.7 milliLiter(s) IntraMuscular once  levothyroxine Injectable 40 MICROGram(s) IV Push at bedtime  loperamide 4 milliGRAM(s) Oral every 6 hours  metoprolol tartrate Injectable 5 milliGRAM(s) IV Push every 6 hours  OMEGAVEN 10G  ML 80 Gram(s) 800 milliLiter(s) (66.67 mL/Hr) IV Continuous <Continuous>  Omegaven 10G/100ml 100 Gram(s) 100 Gram(s) (83.33 mL/Hr) IV Continuous <Continuous>  opium Tincture 6 milliGRAM(s) Oral every 6 hours  Parenteral Nutrition - Adult 1 Each TPN Continuous <Continuous>  ursodiol Capsule 300 milliGRAM(s) Oral every 8 hours  venlafaxine XR. 150 milliGRAM(s) Oral two times a day    MEDICATIONS  (PRN):  LORazepam   Injectable 0.5 milliGRAM(s) IV Push at bedtime PRN Anxiety  ondansetron Injectable 4 milliGRAM(s) IV Push every 8 hours PRN Nausea and/or Vomiting      Vital Signs Last 24 Hrs  T(C): 36.6 (07 Nov 2023 05:04), Max: 37 (06 Nov 2023 09:17)  T(F): 97.8 (07 Nov 2023 05:04), Max: 98.6 (06 Nov 2023 09:17)  HR: 89 (07 Nov 2023 05:00) (83 - 89)  BP: 142/65 (07 Nov 2023 05:00) (127/63 - 147/65)  BP(mean): 94 (07 Nov 2023 05:00) (84 - 101)  RR: 16 (07 Nov 2023 05:00) (15 - 21)  SpO2: 93% (07 Nov 2023 05:00) (93% - 98%)    Parameters below as of 07 Nov 2023 05:00  Patient On (Oxygen Delivery Method): room air        Physical Exam:  General: NAD, resting comfortably in bed  Pulmonary: Nonlabored breathing, no respiratory distress  Cardiovascular: NSR  Abdominal: soft, NT/ND, wound manager in place, ostomy in place, JOYCE in place  Extremities: WWP, normal strength  Neuro: A/O x 3, CNs II-XII grossly intact, no focal deficits    I&O's Summary    06 Nov 2023 07:01  -  07 Nov 2023 07:00  --------------------------------------------------------  IN: 3917.9 mL / OUT: 3520 mL / NET: 397.9 mL        LABS:                        8.3    6.86  )-----------( 198      ( 07 Nov 2023 05:03 )             26.4     11-06    137  |  105  |  31<H>  ----------------------------<  89  4.0   |  26  |  0.94    Ca    8.0<L>      06 Nov 2023 05:30  Phos  3.6     11-06  Mg     1.9     11-06    TPro  7.2  /  Alb  1.9<L>  /  TBili  4.3<H>  /  DBili  2.9<H>  /  AST  92<H>  /  ALT  71<H>  /  AlkPhos  143<H>  11-06      Urinalysis Basic - ( 06 Nov 2023 05:30 )    Color: x / Appearance: x / SG: x / pH: x  Gluc: 89 mg/dL / Ketone: x  / Bili: x / Urobili: x   Blood: x / Protein: x / Nitrite: x   Leuk Esterase: x / RBC: x / WBC x   Sq Epi: x / Non Sq Epi: x / Bacteria: x

## 2023-11-07 NOTE — PROGRESS NOTE ADULT - ASSESSMENT
78M w PMH Crohn's, AFib/Flutter s/p DCCVs on amiodarone, remote ileocectomy and open appendectomy. Admitted (6/23) for SBO vs Crohns flare, s/p NGT decompression and s/p lap converted to open TRE, SBR x 3, left in discontinuity with abthera vac on (6/27), RTOR for ileocolic resection, small bowel anastomosis, and abdominal wall closure on (6/28), c/b fluid collection s/p IR aspiration of perihepatic fluid on (7/3), c/b wound dehiscence s/p RTOR exlap, washout, ileocolic resection with end ileostomy, blow hole colostomy, red rubber from ileostomy to small bowel anastomosis; vicryl bridging mesh on (7/5) transferred to SICU postoperatively for hemodynamic monitoring, with hospital course complicated by periods of severe ELVI and hyponatremia, which resolved but stepped back up for to SICU on (9/10) for acute AMS, intermittent hypoglycemia, AFib with RVR. Percutaneous Cholecystostomy placed on (9/11) for enlarged GB, PCT capped 10/5 and uncapped 10/25 for hyperbilirubinemia. right brachial DVT, left basillic and cephalic superficial thrombus on duplex 11/2.     NEURO: AMS (resolved), EEG neg. Hx of depression - cont Effexor . Cont Melatonin prn Insomnia, Cont Zofran. Lorazepam prn. Generalized fatigue and sweats while ambulating-- RVP/Covid neg, orthostatics BP wnl.   HENT: Cepacol  CV: Hx Afib/flutter: s/p DCCV,  Amio held for Increased LFT'S. Continue Metoprolol 5q6. Hx HLD: Lipitor Held given high LFTs. TTE  (7/18) - PASP 64mmHg, EF 65-70%,  Hx HTN -  holding Norvasc 10qd. Holding Losartan. Cont Lopressor 5 q6.   PULM: no resp distress on room air.   GI/FEN: High protein diet w/ bread. Cont Ensure max x1/day. Cont TPN/ lipids - started Omegavan to reduce cholecystasis:  (2.3 L/day volume) for high ostomy and ECF output: Cont Octreotide in TPN, Imodium, Lomotil, Tincture of Opium, Cholestyramine 10/18. Transaminitis of unknown origin, now stable. s/p Perc Choley on 9/11 - tube uncapped 10/25 for worsening hyperbilirubinemia. MRCP done as per Hepatology started on ursidiol, cont PPI. monitor drainage from fistula, bolus as needed to keep I&O even. GI following, considering Gattex.  : Voids.   ENDO: No need to check ISS. Hx hypothyroid: IV Synthroid, TSH wnl on 10/23  ID: Bld Cx's NGTD. As per Hepatology will get Fungal Bld cx's.  Currently off abx. Cont Nystatin powder // PREVIOUSLY had C. tertium, Lactobacillis from his IR cx 7/3, and candida albicans, lactobacillus, vanc sensitive E faecium, vanc resistant E gallinarum, vanc resistant E casseliflavus, lactobacillus from his OR cx 7/5. Completed course of abx with Imipenem (9/10-9/15). Imipenem (8/26--) imipenem (6/30-7/12, 7/23-7/30), Dapto (6/30-7/5 and 7/23-7/24). Empiric dapto (8/23-24) and cefepime (8/23-24).   PPX: SCDs, Therapeutic lovenox 2/2 R brachial vein DVT (11/3 - )  LINES: PIVs, RUE PICC: (11/1-) DC'd: LUE PICC (9/1-11/1 )  WOUNDS/DRAINS: Abdominal wound and fistula with wound manager in place dressing change T & F. RLQ Ostomy. JOYCE. IR perc cony.   PT: Ambulate as tolerated   DISPO: SICU due to complicated hospital course. but will do vitals q4hrs (hold overnight), and CBC/BMP QD in setting of high fistula output.   78M w PMH Crohn's, AFib/Flutter s/p DCCVs on amiodarone, remote ileocectomy and open appendectomy. Admitted (6/23) for SBO vs Crohns flare, s/p NGT decompression and s/p lap converted to open TRE, SBR x 3, left in discontinuity with abthera vac on (6/27), RTOR for ileocolic resection, small bowel anastomosis, and abdominal wall closure on (6/28), c/b fluid collection s/p IR aspiration of perihepatic fluid on (7/3), c/b wound dehiscence s/p RTOR exlap, washout, ileocolic resection with end ileostomy, blow hole colostomy, red rubber from ileostomy to small bowel anastomosis; vicryl bridging mesh on (7/5) transferred to SICU postoperatively for hemodynamic monitoring, with hospital course complicated by periods of severe ELVI and hyponatremia, which resolved but stepped back up for to SICU on (9/10) for acute AMS, intermittent hypoglycemia, AFib with RVR. Percutaneous Cholecystostomy placed on (9/11) for enlarged GB, PCT capped 10/5 and uncapped 10/25 for hyperbilirubinemia--improving after changes to TPN made. Right brachial DVT, left basillic and cephalic superficial thrombus on duplex 11/2, on Lovenox 100 BID.    NEURO: AMS (resolved), EEG neg. Hx of depression - cont Effexor. Insombnia: Melatonin PRN. Nausea: Zofran PRN. Anxiety: Lorazepam PRN.   HENT: Cepacol  CV: Hx Afib/flutter: s/p DCCV,  Amio held for Increased LFT'S. Continue Metoprolol 5q6. Hx HLD: Lipitor held given high LFTs. TTE  (7/18) - PASP 64mmHg, EF 65-70%,  Hx HTN -  holding Norvasc 10qd. Holding Losartan. Cont Lopressor 5 q6.   PULM: no resp distress on room air.   GI/FEN: High protein diet w/ bread. Cont Ensure max x1/day. Cont TPN/ lipids - Continue Omegavan to reduce cholecystasis:  (2.3 L/day volume) for high ostomy and ECF output: Cont Octreotide in TPN, Imodium, Lomotil, Tincture of Opium, Cholestyramine 10/18. Transaminitis of unknown origin, now stable. s/p Perc Choley on 9/11 - tube uncapped 10/25 for worsening hyperbilirubinemia. MRCP done as per Hepatology started on ursidiol, cont PPI. monitor drainage from fistula, bolus as needed to keep I&O even. GI following, considering Gattex.  : Voids.   ENDO: No need to check ISS. Hx hypothyroid: IV Synthroid, TSH wnl on 10/23  ID: Bld Cx's NGTD. As per Hepatology will get Fungal Bld cx's.  Currently off abx. Cont Nystatin powder // PREVIOUSLY had C. tertium, Lactobacillis from his IR cx 7/3, and candida albicans, lactobacillus, vanc sensitive E faecium, vanc resistant E gallinarum, vanc resistant E casseliflavus, lactobacillus from his OR cx 7/5. Completed course of abx with Imipenem (9/10-9/15). Imipenem (8/26--) imipenem (6/30-7/12, 7/23-7/30), Dapto (6/30-7/5 and 7/23-7/24). Empiric dapto (8/23-24) and cefepime (8/23-24).   PPX: SCDs, Therapeutic lovenox 2/2 R brachial vein DVT (11/3 - )  LINES: PIVs, RUE PICC: (11/1-) DC'd: LUE PICC (9/1-11/1 )  WOUNDS/DRAINS: Abdominal wound and fistula with wound manager in place dressing change T & F. RLQ Ostomy. IR perc cony. Remove L Clay drain.  PT: Ambulate as tolerated   DISPO: SICU due to complicated hospital course. but will do vitals q4hrs (hold overnight), and CBC/BMP QD in setting of high fistula output.

## 2023-11-08 NOTE — PROGRESS NOTE ADULT - SUBJECTIVE AND OBJECTIVE BOX
78M with history of Crohn's, AFib/Flutter s/p DCCVs on amiodarone, remote ileocectomy and open appendectomy. Admitted (6/23) for SBO vs Crohns flare, s/p NGT decompression and s/p lap TRE converted to open TRE, SBR x 3, left in discontinuity with abthera vac on (6/27), RTOR for ileocolic resection, small bowel anastomosis, and abdominal wall closure on (6/28), c/b fluid collection s/p IR aspiration of perihepatic fluid on (7/3), c/b wound dehiscence s/p RTOR exlap, washout, ileocolic resection with end ileostomy, blow hole colostomy, red rubber from ileostomy to small bowel anastomosis; vicryl bridging mesh on (7/5) transferred to SICU postoperatively for hemodynamic monitoring, with hospital course complicated by periods of AMS most recently on 9/1 and associated with oliguria, severe ELVI and hyponatremia, which resolved but stepped back up for to SICU on 9/10 for acute AMS, intermittent hypoglycemia, AFib with RVR. Percutaneous Cholecystostomy placed on 9/11 for enlarged GB, PCT capped 10/5, and connected to drainage bag 10/25.    Percutaneous cholecystostomy tube with 175 cc output past 24 hr, previously 670 cc/24h    -Strict I/O, monitor output  -IR will continue to follow

## 2023-11-08 NOTE — PROGRESS NOTE ADULT - ASSESSMENT
78M w PMH Crohn's, AFib/Flutter s/p DCCVs on amiodarone, remote ileocectomy and open appendectomy. Admitted (6/23) for SBO vs Crohns flare, s/p NGT decompression and s/p lap converted to open TRE, SBR x 3, left in discontinuity with abthera vac on (6/27), RTOR for ileocolic resection, small bowel anastomosis, and abdominal wall closure on (6/28), c/b fluid collection s/p IR aspiration of perihepatic fluid on (7/3), c/b wound dehiscence s/p RTOR exlap, washout, ileocolic resection with end ileostomy, blow hole colostomy, red rubber from ileostomy to small bowel anastomosis; vicryl bridging mesh on (7/5) transferred to SICU postoperatively for hemodynamic monitoring, with hospital course complicated by periods of severe ELVI and hyponatremia, which resolved but stepped back up for to SICU on (9/10) for acute AMS, intermittent hypoglycemia, AFib with RVR. Percutaneous Cholecystostomy placed on (9/11) for enlarged GB, PCT capped 10/5 and uncapped 10/25 for hyperbilirubinemia--improving after changes to TPN made. Right brachial DVT, left basillic and cephalic superficial thrombus on duplex 11/2, on Lovenox 100 BID.    NEURO: AMS (resolved), EEG neg. Hx of depression - cont Effexor. Insomnia: Melatonin PRN. Nausea: Zofran PRN. Anxiety: Lorazepam PRN.   HENT: Cepacol  CV: Hx Afib/flutter: s/p DCCV,  Amio held for Increased LFT'S. Continue Metoprolol 5q6. Hx HLD: Lipitor held given high LFTs. TTE  (7/18) - PASP 64mmHg, EF 65-70%,  Hx HTN -  holding Norvasc 10qd. Holding Losartan. Cont Lopressor 5 q6.   PULM: no resp distress on room air.   GI/FEN: High protein diet w/ bread. Cont Ensure max x1/day. Cont TPN/ lipids - Continue Omegavan to reduce cholecystasis:  (2.3 L/day volume) for high ostomy and ECF output: Cont Octreotide in TPN, Imodium, Lomotil, Tincture of Opium, Cholestyramine 10/18. Transaminitis of unknown origin, now stable. s/p Perc Choley on 9/11 - tube uncapped 10/25 for worsening hyperbilirubinemia. MRCP done as per Hepatology started on ursidiol, cont PPI. monitor drainage from fistula, bolus as needed to keep I&O even. GI following, considering Gattex.  : Voids.   ENDO: No need to check ISS. Hx hypothyroid: IV Synthroid, TSH wnl on 10/23  ID: Bld Cx's NGTD. As per Hepatology will get Fungal Bld cx's.  Currently off abx. Cont Nystatin powder // PREVIOUSLY had C. tertium, Lactobacillis from his IR cx 7/3, and candida albicans, lactobacillus, vanc sensitive E faecium, vanc resistant E gallinarum, vanc resistant E casseliflavus, lactobacillus from his OR cx 7/5. Completed course of abx with Imipenem (9/10-9/15). Imipenem (8/26--) imipenem (6/30-7/12, 7/23-7/30), Dapto (6/30-7/5 and 7/23-7/24). Empiric dapto (8/23-24) and cefepime (8/23-24).   PPX: SCDs, Therapeutic lovenox 2/2 R brachial vein DVT (11/3 --). AntiXa level 0.5--no changes. Pending repeat B/L UE Duplex to make sure thrombus is not progressing.   LINES: PIVs, RUE PICC: (11/1-) DC'd: LUE PICC (9/1-11/1 )  WOUNDS/DRAINS: Abdominal wound and fistula with wound manager in place dressing change T & F. RLQ Ostomy. IR perc cony. // DC: L Clay drain.  PT: Ambulate as tolerated   DISPO: SICU due to complicated hospital course. but will do vitals q4hrs (hold overnight), and CBC/BMP QD in setting of high fistula output.

## 2023-11-08 NOTE — PROGRESS NOTE ADULT - SUBJECTIVE AND OBJECTIVE BOX
SUBJECTIVE: Pt seen and examined at bedside this am by surgery team. Tolerating diet, reports feeling better after getting 1 PRBC.     MEDICATIONS  (STANDING):  chlorhexidine 0.12% Liquid 15 milliLiter(s) Swish and Spit two times a day  chlorhexidine 2% Cloths 1 Application(s) Topical <User Schedule>  cholestyramine Powder (Sugar-Free) 4 Gram(s) Oral daily  diphenoxylate/atropine 2 Tablet(s) Oral every 6 hours  enoxaparin Injectable 100 milliGRAM(s) SubCutaneous every 12 hours  epoetin matt (EPOGEN) Injectable 19463 Unit(s) SubCutaneous every 7 days  glucagon  Injectable 1 milliGRAM(s) IntraMuscular once  influenza  Vaccine (HIGH DOSE) 0.7 milliLiter(s) IntraMuscular once  levothyroxine Injectable 40 MICROGram(s) IV Push at bedtime  loperamide 4 milliGRAM(s) Oral every 6 hours  metoprolol tartrate Injectable 5 milliGRAM(s) IV Push every 6 hours  Omegaven 10G/100ml 100 Gram(s) 100 Gram(s) (83.33 mL/Hr) IV Continuous <Continuous>  Omegaven 10G/100ml 100 Gram(s) 100 Gram(s) (83.33 mL/Hr) IV Continuous <Continuous>  opium Tincture 6 milliGRAM(s) Oral every 6 hours  Parenteral Nutrition - Adult 1 Each TPN Continuous <Continuous>  ursodiol Capsule 300 milliGRAM(s) Oral every 8 hours  venlafaxine XR. 150 milliGRAM(s) Oral two times a day    MEDICATIONS  (PRN):  LORazepam   Injectable 0.5 milliGRAM(s) IV Push at bedtime PRN Anxiety  ondansetron Injectable 4 milliGRAM(s) IV Push every 8 hours PRN Nausea and/or Vomiting      Vital Signs Last 24 Hrs  T(C): 36.6 (08 Nov 2023 05:06), Max: 36.6 (08 Nov 2023 05:06)  T(F): 97.8 (08 Nov 2023 05:06), Max: 97.8 (08 Nov 2023 05:06)  HR: 90 (08 Nov 2023 05:20) (86 - 91)  BP: 160/70 (08 Nov 2023 05:20) (122/58 - 160/70)  BP(mean): 100 (08 Nov 2023 05:20) (84 - 100)  RR: 20 (08 Nov 2023 05:20) (14 - 20)  SpO2: 95% (08 Nov 2023 05:20) (93% - 100%)    Parameters below as of 08 Nov 2023 05:20  Patient On (Oxygen Delivery Method): room air      Physical Exam:  General: NAD, resting comfortably in bed  Pulmonary: Nonlabored breathing, no respiratory distress  Cardiovascular: NSR  Abdominal: soft, NT/ND, wound manager in place, ostomy in place, perc cony bilious  Extremities: WWP, normal strength  Neuro: A/O x 3, CNs II-XII grossly intact, no focal deficits      I&O's Detail    06 Nov 2023 07:01  -  07 Nov 2023 07:00  --------------------------------------------------------  IN:    freetext medication - Infusion: 1082.9 mL    Oral Fluid: 200 mL    TPN (Total Parenteral Nutrition): 2635 mL  Total IN: 3917.9 mL    OUT:    Bulb (mL): 0 mL    Drain (mL): 1010 mL    Drain (mL): 705 mL    Drain (mL): 80 mL    Voided (mL): 1725 mL  Total OUT: 3520 mL    Total NET: 397.9 mL      07 Nov 2023 07:01  -  08 Nov 2023 06:27  --------------------------------------------------------  IN:    freetext medication - Infusion: 1082.9 mL    Oral Fluid: 250 mL    TPN (Total Parenteral Nutrition): 2770 mL  Total IN: 4102.9 mL    OUT:    Drain (mL): 735 mL    Drain (mL): 670 mL    Drain (mL): 30 mL    Voided (mL): 1700 mL  Total OUT: 3135 mL    Total NET: 967.9 mL          LABS:                        8.3    6.86  )-----------( 198      ( 07 Nov 2023 05:03 )             26.4

## 2023-11-08 NOTE — PROGRESS NOTE ADULT - ASSESSMENT
78 M w/ Crohn's, AFib/Flutter s/p DCCVs on amiodarone, remote ileocectomy and open appendectomy. Admitted (6/23) for SBO vs Crohns flare, s/p NGT decompression and s/p lap TRE converted to open TRE, SBR x 3, left in discontinuity with abthera vac on (6/27), RTOR for ileocolic resection, small bowel anastomosis, and abdominal wall closure on (6/28), c/b fluid collection s/p IR aspiration of perihepatic fluid on (7/3), c/b wound dehiscence s/p RTOR exlap, washout, ileocolic resection with end ileostomy, blow hole colostomy, red rubber from ileostomy to small bowel anastomosis; vicryl bridging mesh on (7/5) transferred to SICU postoperatively for hemodynamic monitoring, with hospital course complicated by periods of AMS most recently on 9/1 and associated with oliguria, severe ELVI and hyponatremia, which resolved but stepped back up for to SICU on 9/10 for acute AMS, intermittent hypoglycemia, AFib with RVR. Percutaneous Cholecystostomy placed on 9/11 for enlarged GB, PCT capped 10/5. 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 Left Clay drain removed 11/7. Tolerating regular diet. Hb 8.3 stable after 1 PRBC.     Plan   - Strict I&Os  - Continue SICU care   - Pending rest of plan  - Team 5 will follow

## 2023-11-08 NOTE — CHART NOTE - NSCHARTNOTEFT_GEN_A_CORE
Admitting Diagnosis:   Patient is a 78y old  Male who presents with a chief complaint of SBO       PAST MEDICAL & SURGICAL HISTORY:  Essential hypertension  Hypertension      Atrial fibrillation  s/p cardioversion  and   Pt. reports 4 DCCV  Now on Amiodarone 200mg PO bid and Eliquis 5mg PO bid  Last D[facility]rs [facility]      Crohn's disease  s/p partial resection of ileum      Hyperlipidemia      Hypothyroidism      History of depression  On Venlafaxine ER 150mg PO bid      Junctional rhythm      H/O knee surgery      History of cataract surgery          Current Nutrition Order:  TPN- 2.3L bag running over 18hrs (70ml/hr), providing 411g Dex, 144g AA, 100g lipids; provides 2973.4 kcals, 144g protein, GIR 3.35 mg/kg/min  PO- Regular diet Ensure Max BID (150 kcals, 30g protein)    PO Intake: Good (%) [ X ]  Fair (50-75%) [   ] Poor (<25%) [   ]    GI Issues: drain, ileostomy, abdominal wound and fistula in place.    Pain: no pain/discomfort noted    Skin Integrity: abd wound [10.5 x 6.5cm] and fistula with wound manager in place, RLQ ileostomy, L JOYCE drain, perc cony drain, skin tear below ostomy. wound to R heel resolved  Rudy score 19; slightly jaundiced      Labs:   Reviewed        Medications:  MEDICATIONS  (STANDING):  chlorhexidine 0.12% Liquid 15 milliLiter(s) Swish and Spit two times a day  chlorhexidine 2% Cloths 1 Application(s) Topical <User Schedule>  cholestyramine Powder (Sugar-Free) 4 Gram(s) Oral daily  diphenoxylate/atropine 2 Tablet(s) Oral every 6 hours  enoxaparin Injectable 100 milliGRAM(s) SubCutaneous every 12 hours  epoetin matt (EPOGEN) Injectable 32027 Unit(s) SubCutaneous every 7 days  glucagon  Injectable 1 milliGRAM(s) IntraMuscular once  influenza  Vaccine (HIGH DOSE) 0.7 milliLiter(s) IntraMuscular once  levothyroxine Injectable 40 MICROGram(s) IV Push at bedtime  loperamide 4 milliGRAM(s) Oral every 6 hours  metoprolol tartrate Injectable 5 milliGRAM(s) IV Push every 6 hours  Omegaven 10G/100ML 80 Gram(s) 80 Gram(s) (66.67 mL/Hr) IV Continuous <Continuous>  opium Tincture 6 milliGRAM(s) Oral every 6 hours  Parenteral Nutrition - Adult 1 Each TPN Continuous <Continuous>  Parenteral Nutrition - Adult 1 Each TPN Continuous <Continuous>  ursodiol Capsule 300 milliGRAM(s) Oral every 8 hours  venlafaxine XR. 150 milliGRAM(s) Oral two times a day    MEDICATIONS  (PRN):  LORazepam   Injectable 0.5 milliGRAM(s) IV Push at bedtime PRN Anxiety  ondansetron Injectable 4 milliGRAM(s) IV Push every 8 hours PRN Nausea and/or Vomiting      Daily Weight in k.2 (2023 01:07)  Daily 85.2kg [31 Oct 2023]  Daily 85.2kg [24 Oct 2023]  Daily 85.2kg [20 Oct 2023]  Daily 81.9kg [18 Oct 2023]  Daily 85.2kg [16 Oct 2023]  Daily   95.2kg [12 Oct 2023]   Daily 87.7kg [11 Oct 2023]  Daily 87.4kg [09 Oct 2023]  Daily 86.4kg [07 Oct 2023]  Daily 82.5kg [06 Oct 2023]  Daily 82.6kg [4 Oct 2023]   Daily 83.5kg [03 Oct 2023]  Daily 84.5kg [2 Oct 2023]  Daily 87.2kg [1 Oct 2023]  Daily 88.3kg [29 Sep 2023]  Daily 89.9kg [28 Sep 2023]  Daily 87.7kg [24 Sep 2023]  Daily 90.4kg [21 Sep 2023]  Daily 91.4kg [20 Sep 2023]  Daily 86.7kg [18 Sep 2023]  Daily 88.1kg [16 Sep 2023]  Daily 88.9kg [15 Sep 2023]   Daily 89.1 [14 Sep 2023]  Daily 92.9kg [6 Sep 2023]  Daily 91.8kg [27 Aug 2023]  Daily 101kg [9 Aug 2023]  Daily   102.1kg [10 July 2023]  Daily 99.9kg [2023]    Weight Change: weight trending down throughout admission. Recommend continue weekly/daily weights for trending & ensuring adequacy of nutrition provision    IBW: 86.4kg   % IBW: 98.6%    Estimated energy needs:   Current body wt [85.2kg] used for energy calculations as pt <100% IBW. Needs estimated for age and adjusted for current clinical status, increased needs for post-op & open abd wound healing    Calories: 3774-8732 kcals based on 30-40 kcals/kg  Protein: 127.8-170.4 g protein based on 1.5-2.0g protein/kg + additional depending on outputs  *Fluid needs per team    Subjective:   78 M w/ Crohn's, AFib/Flutter s/p DCCVs on amiodarone, remote ileocectomy and open appendectomy. Admitted () for SBO vs Crohns flare, s/p NGT decompression and s/p lap TRE converted to open TRE, SBR x 3, left in discontinuity with abthera vac on (), RTOR for ileocolic resection, small bowel anastomosis, and abdominal wall closure on (), c/b fluid collection s/p IR aspiration of perihepatic fluid on (7/3), c/b wound dehiscence s/p RTOR exlap, washout, ileocolic resection with end ileostomy, blow hole colostomy, red rubber from ileostomy to small bowel anastomosis; vicryl bridging mesh on () transferred to SICU postoperatively for hemodynamic monitoring, with hospital course complicated by periods of AMS most recently on  and associated with oliguria, severe ELVI and hyponatremia, which resolved but stepped back up for to SICU on 9/10 for acute AMS, intermittent hypoglycemia, AFib with RVR. Percutaneous Cholecystostomy placed on  for enlarged GB, PCT capped 10/5.    Chart reviewed, labs & Rx reviewed. Pt seen at bedside on . TPN remains primary means to nutrition, advanced to regular diet + ensure max. Tolerating solids well, however require calorie count to quantify actual intake. To continue cycling TPN over 18hrs, consider decreasing to 12-16hrs. Serum copper 17 ug/dL 10/16- to provide 3x/wk [MWF] in TPN bag and recheck levels. Remains on cholestyramine, opium tincture Q6hrs, imodium, synthroid [administer 30-60 minutes before food; separate at least 4hrs from calcium or iron-containing products or bile acid sequestrants], zofran prn. RDN will continue to monitor, reassess, and intervene as appropriate.     Previous Nutrition Diagnosis: Increased Nutrient Needs R/T physiological demands for nutrient AEB post-op wound healing    Active [ X  ]  Resolved [   ]    If resolved, new PES:     Goal:  Pt will meet at least 75% of protein & energy needs via most appropriate route for nutrition     Recommendations:  1. c/w TPN via PICC as primary means to nutrition- 411g Dex, 144g AA, 100g Lipids; provides 2973.4 kcals, 144g protein, GIR 3.35 mg/kg/min  - provides 34.9 kcals/kg, 28.1 non-protein kcals/kg, 1.7g protein/kg  - consider cycling TPN over 12-16hrs, consider providing fish oil ILE monotherapy or additional in conjunction with SMOF lipids [decrease SMOF]  - monitor LFTs closely, consider decreasing SMOF lipids to 50g/day    - MVI, selenium, zinc in TPN bag  - provide copper in TPN 3x/week [MWF]-- recheck levels   - monitor weights & wound healing, adjust substrates as indicated  2. Fluid & lytes per team  - additional fluid bolus prn  3. Regular diet + ensure max BID  - recommend calorie count  4. Weekly lipid panel   - hold/decrease lipids if TG >400  5. Monitor BMP/Mg/Phos, POC BG  - monitor & replenish lytes outside TPN bag prn  6. Continue close monitoring of clinical course & adjust recommendations prn  7. Monitor feasibility for advancing PO diet    Education: PO diet    Risk Level: High [  X ] Moderate [   ] Low [   ].

## 2023-11-08 NOTE — PROGRESS NOTE ADULT - SUBJECTIVE AND OBJECTIVE BOX
INTERVAL/OVERNIGHT EVENTS: AntiXa sent too late so level is incorrect.     SUBJECTIVE: This AM, doing well without complaints. Eating well without pain. No N/V or abd pain. Has some soreness at the site of prior drain on LLQ but not too bothersome. Voids without issues.     Neurologic Medications  LORazepam   Injectable 0.5 milliGRAM(s) IV Push at bedtime PRN Anxiety  ondansetron Injectable 4 milliGRAM(s) IV Push every 8 hours PRN Nausea and/or Vomiting  opium Tincture 6 milliGRAM(s) Oral every 6 hours  venlafaxine XR. 150 milliGRAM(s) Oral two times a day    Cardiovascular Medications  metoprolol tartrate Injectable 5 milliGRAM(s) IV Push every 6 hours    Gastrointestinal Medications  diphenoxylate/atropine 2 Tablet(s) Oral every 6 hours  loperamide 4 milliGRAM(s) Oral every 6 hours  Parenteral Nutrition - Adult 1 Each TPN Continuous <Continuous>  ursodiol Capsule 300 milliGRAM(s) Oral every 8 hours    Hematologic/Oncologic Medications  enoxaparin Injectable 100 milliGRAM(s) SubCutaneous every 12 hours  epoetin matt (EPOGEN) Injectable 05038 Unit(s) SubCutaneous every 7 days  influenza  Vaccine (HIGH DOSE) 0.7 milliLiter(s) IntraMuscular once    Endocrine/Metabolic Medications  cholestyramine Powder (Sugar-Free) 4 Gram(s) Oral daily  glucagon  Injectable 1 milliGRAM(s) IntraMuscular once  levothyroxine Injectable 40 MICROGram(s) IV Push at bedtime    Topical/Other Medications  chlorhexidine 0.12% Liquid 15 milliLiter(s) Swish and Spit two times a day  chlorhexidine 2% Cloths 1 Application(s) Topical <User Schedule>  Omegaven 10G/100ML 80 Gram(s) 80 Gram(s) IV Continuous <Continuous>    MEDICATIONS  (PRN):  LORazepam   Injectable 0.5 milliGRAM(s) IV Push at bedtime PRN Anxiety  ondansetron Injectable 4 milliGRAM(s) IV Push every 8 hours PRN Nausea and/or Vomiting    I&O's Detail    07 Nov 2023 07:01  -  08 Nov 2023 07:00  --------------------------------------------------------  IN:    freetext medication - Infusion: 1082.9 mL    Oral Fluid: 250 mL    TPN (Total Parenteral Nutrition): 2770 mL  Total IN: 4102.9 mL    OUT:    Drain (mL): 735 mL    Drain (mL): 670 mL    Drain (mL): 30 mL    Voided (mL): 1700 mL  Total OUT: 3135 mL    Total NET: 967.9 mL    08 Nov 2023 07:01  -  08 Nov 2023 14:00  --------------------------------------------------------  IN:    Oral Fluid: 240 mL    TPN (Total Parenteral Nutrition): 675 mL  Total IN: 915 mL    OUT:    Drain (mL): 175 mL    Drain (mL): 325 mL    Drain (mL): 75 mL    Voided (mL): 575 mL  Total OUT: 1150 mL    Total NET: -235 mL    Vital Signs Last 24 Hrs  T(C): 36.7 (08 Nov 2023 09:24), Max: 36.7 (08 Nov 2023 09:24)  T(F): 98.1 (08 Nov 2023 09:24), Max: 98.1 (08 Nov 2023 09:24)  HR: 90 (08 Nov 2023 13:00) (86 - 91)  BP: 140/62 (08 Nov 2023 13:00) (131/64 - 160/70)  BP(mean): 89 (08 Nov 2023 13:00) (89 - 101)  RR: 26 (08 Nov 2023 13:00) (14 - 26)  SpO2: 97% (08 Nov 2023 13:00) (94% - 100%)    Parameters below as of 08 Nov 2023 13:00  Patient On (Oxygen Delivery Method): room air    GENERAL: NAD, resting comfortably in bed, AxOx3  HEENT: NCAT, MMM  C/V: Normal rate, normal peripheral perfusion, no murmurs  PULM: Nonlabored breathing, no respiratory distress, CTA b/l  ABD: Soft, ND, NT, no rebound tenderness, no guarding, JOYCE drain site CDI without drainage, minimal TTP, ECF wound manager in place.  EXTREM: WWP, no edema, SCDs in place  NEURO: No focal deficits    LABS:                        8.3    6.86  )-----------( 198      ( 07 Nov 2023 05:03 )             26.4

## 2023-11-09 NOTE — PROGRESS NOTE ADULT - NS ATTEND AMEND GEN_ALL_CORE FT
Crohn's, AF, LUE DVT s/p sbr, ileocolic resection, end ileostomy, enteroatmospheric fistula  physical as above  continue TPN   bilirubin decreasing on ursodiol and omega 3 FAs  continue lovenox  metoprolol continues  rest as above

## 2023-11-09 NOTE — PROGRESS NOTE ADULT - ASSESSMENT
78 M w/ Crohn's, AFib/Flutter s/p DCCVs on amiodarone, remote ileocectomy and open appendectomy. Admitted (6/23) for SBO vs Crohns flare, s/p NGT decompression and s/p lap TRE converted to open TRE, SBR x 3, left in discontinuity with abthera vac on (6/27), RTOR for ileocolic resection, small bowel anastomosis, and abdominal wall closure on (6/28), c/b fluid collection s/p IR aspiration of perihepatic fluid on (7/3), c/b wound dehiscence s/p RTOR exlap, washout, ileocolic resection with end ileostomy, blow hole colostomy, red rubber from ileostomy to small bowel anastomosis; vicryl bridging mesh on (7/5) transferred to SICU postoperatively for hemodynamic monitoring, with hospital course complicated by periods of AMS most recently on 9/1 and associated with oliguria, severe ELVI and hyponatremia, which resolved but stepped back up for to SICU on 9/10 for acute AMS, intermittent hypoglycemia, AFib with RVR. Percutaneous Cholecystostomy placed on 9/11 for enlarged GB, PCT capped 10/5. 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 Left Clay drain removed 11/7. Tolerating regular diet. Hb 8.3 stable after 1 PRBC.     Plan   - Strict I&Os  - Continue SICU care   - Repeat am labs  - Team 5 will follow

## 2023-11-09 NOTE — PROGRESS NOTE ADULT - SUBJECTIVE AND OBJECTIVE BOX
INTERVAL/OVERNIGHT EVENTS: NAEO.    SUBJECTIVE: This AM, doing well without complaints. No N/V or abd pain. Tolerating diet. No CP/SOB. Voids without issues.    Neurologic Medications  LORazepam   Injectable 0.5 milliGRAM(s) IV Push at bedtime PRN Anxiety  ondansetron Injectable 4 milliGRAM(s) IV Push every 8 hours PRN Nausea and/or Vomiting  opium Tincture 6 milliGRAM(s) Oral every 6 hours  venlafaxine XR. 150 milliGRAM(s) Oral two times a day    Cardiovascular Medications  metoprolol tartrate Injectable 5 milliGRAM(s) IV Push every 6 hours    Gastrointestinal Medications  diphenoxylate/atropine 2 Tablet(s) Oral every 6 hours  loperamide 4 milliGRAM(s) Oral every 6 hours  Parenteral Nutrition - Adult 1 Each TPN Continuous <Continuous>  ursodiol Capsule 300 milliGRAM(s) Oral every 8 hours    Hematologic/Oncologic Medications  enoxaparin Injectable 100 milliGRAM(s) SubCutaneous every 12 hours  epoetin matt (EPOGEN) Injectable 28014 Unit(s) SubCutaneous every 7 days  influenza  Vaccine (HIGH DOSE) 0.7 milliLiter(s) IntraMuscular once    Endocrine/Metabolic Medications  cholestyramine Powder (Sugar-Free) 4 Gram(s) Oral daily  glucagon  Injectable 1 milliGRAM(s) IntraMuscular once  levothyroxine Injectable 40 MICROGram(s) IV Push at bedtime    Topical/Other Medications  chlorhexidine 0.12% Liquid 15 milliLiter(s) Swish and Spit two times a day  chlorhexidine 2% Cloths 1 Application(s) Topical <User Schedule>  Omegaven 10G/100ML 80 Gram(s),Omegaven 10G/100ML 80 Gram(s) 80 Gram(s) IV Continuous <Continuous>    MEDICATIONS  (PRN):  LORazepam   Injectable 0.5 milliGRAM(s) IV Push at bedtime PRN Anxiety  ondansetron Injectable 4 milliGRAM(s) IV Push every 8 hours PRN Nausea and/or Vomiting    I&O's Detail    08 Nov 2023 07:01  -  09 Nov 2023 07:00  --------------------------------------------------------  IN:    freetext medication - Infusion: 800 mL    Oral Fluid: 240 mL    TPN (Total Parenteral Nutrition): 2635 mL  Total IN: 3675 mL    OUT:    Drain (mL): 150 mL    Drain (mL): 575 mL    Drain (mL): 1375 mL    Voided (mL): 2325 mL  Total OUT: 4425 mL    Total NET: -750 mL    09 Nov 2023 07:01  -  09 Nov 2023 10:33  --------------------------------------------------------  IN:    TPN (Total Parenteral Nutrition): 405 mL  Total IN: 405 mL    OUT:    Drain (mL): 240 mL    Drain (mL): 0 mL    Drain (mL): 150 mL    freetext medication - Infusion: 0 mL  Total OUT: 390 mL    Total NET: 15 mL    Vital Signs Last 24 Hrs  T(C): 36.4 (09 Nov 2023 09:21), Max: 37.1 (09 Nov 2023 05:26)  T(F): 97.5 (09 Nov 2023 09:21), Max: 98.8 (09 Nov 2023 05:26)  HR: 92 (09 Nov 2023 09:21) (89 - 92)  BP: 131/61 (09 Nov 2023 04:00) (131/61 - 162/70)  BP(mean): 88 (09 Nov 2023 04:00) (88 - 100)  RR: 17 (09 Nov 2023 09:21) (17 - 26)  SpO2: 94% (09 Nov 2023 09:21) (93% - 100%)    Parameters below as of 09 Nov 2023 04:00  Patient On (Oxygen Delivery Method): room air    GENERAL: NAD, resting comfortably in bed, AxOx3  HEENT: NCAT, MMM  C/V: Normal rate, normal peripheral perfusion, no murmurs  PULM: Nonlabored breathing, no respiratory distress, CTA b/l  ABD: Soft, ND, NT, no rebound tenderness, no guarding, JOYCE drain site CDI without drainage, minimal TTP, ECF wound manager in place.  EXTREM: WWP, no edema, SCDs in place  NEURO: No focal deficits    LABS:                        8.1    6.20  )-----------( 195      ( 09 Nov 2023 07:15 )             25.9     11-09    137  |  103  |  30<H>  ----------------------------<  129<H>  4.0   |  26  |  0.92    Ca    8.4      09 Nov 2023 07:15  Phos  3.7     11-09  Mg     1.9     11-09    TPro  7.5  /  Alb  2.0<L>  /  TBili  3.9<H>  /  DBili  2.6<H>  /  AST  92<H>  /  ALT  70<H>  /  AlkPhos  149<H>  11-09    Urinalysis Basic - ( 09 Nov 2023 07:15 )    Color: x / Appearance: x / SG: x / pH: x  Gluc: 129 mg/dL / Ketone: x  / Bili: x / Urobili: x   Blood: x / Protein: x / Nitrite: x   Leuk Esterase: x / RBC: x / WBC x   Sq Epi: x / Non Sq Epi: x / Bacteria: x

## 2023-11-09 NOTE — PROGRESS NOTE ADULT - ASSESSMENT
78M w PMH Crohn's, AFib/Flutter s/p DCCVs on amiodarone, remote ileocectomy and open appendectomy. Admitted (6/23) for SBO vs Crohns flare, s/p NGT decompression and s/p lap converted to open TRE, SBR x 3, left in discontinuity with abthera vac on (6/27), RTOR for ileocolic resection, small bowel anastomosis, and abdominal wall closure on (6/28), c/b fluid collection s/p IR aspiration of perihepatic fluid on (7/3), c/b wound dehiscence s/p RTOR exlap, washout, ileocolic resection with end ileostomy, blow hole colostomy, red rubber from ileostomy to small bowel anastomosis; vicryl bridging mesh on (7/5) transferred to SICU postoperatively for hemodynamic monitoring, with hospital course complicated by periods of severe ELVI and hyponatremia, which resolved but stepped back up for to SICU on (9/10) for acute AMS, intermittent hypoglycemia, AFib with RVR. Percutaneous Cholecystostomy placed on (9/11) for enlarged GB, PCT capped 10/5 and uncapped 10/25 for hyperbilirubinemia--improving after changes to TPN made. Right brachial DVT, left basillic and cephalic superficial thrombus on duplex 11/2, on Lovenox 100 BID.    NEURO: AMS (resolved). Hx of Depression - cont Effexor. Insomnia: Melatonin PRN. Nausea: Zofran PRN. Anxiety: Lorazepam PRN.   HENT: Cepacol  CV: Hx Afib/flutter: s/p DCCV, Amio held for Increased LFTs, continue Metoprolol 5q6. Hx HLD: Lipitor held given high LFTs. TTE  (7/18) - PASP 64mmHg, EF 65-70%, Hx HTN: holding Norvasc 10qd, holding Losartan, cont Lopressor 5 q6.   PULM: no resp distress on room air.   GI/FEN: High protein diet w/ bread. Cont Ensure max x1/day. Cont TPN/ lipids - Continue Omegavan to reduce cholecystasis:  (2.3 L/day volume) for high ostomy and ECF output: Cont Octreotide in TPN, Imodium, Lomotil, Tincture of Opium, Cholestyramine 10/18. Transaminitis of unknown origin, now stable. s/p Perc Choley on 9/11 - tube uncapped 10/25 for worsening hyperbilirubinemia. MRCP done as per Hepatology started on ursidiol, cont PPI. monitor drainage from fistula, bolus as needed to keep I&O even. GI following, considering Gattex.  : Voids.   ENDO: No need to check ISS. Hx hypothyroid: IV Synthroid, TSH wnl on 10/23  ID: Bld Cx's NGTD. As per Hepatology will get Fungal Bld cx's.  Currently off abx. Cont Nystatin powder // PREVIOUSLY had C. tertium, Lactobacillis from his IR cx 7/3, and candida albicans, lactobacillus, vanc sensitive E faecium, vanc resistant E gallinarum, vanc resistant E casseliflavus, lactobacillus from his OR cx 7/5. Completed course of abx with Imipenem (9/10-9/15). Imipenem (8/26--) imipenem (6/30-7/12, 7/23-7/30), Dapto (6/30-7/5 and 7/23-7/24). Empiric dapto (8/23-24) and cefepime (8/23-24).   PPX: SCDs, Therapeutic lovenox 2/2 R brachial vein DVT (11/3--). Repeat B/L UE Duplex.   LINES: PIVs, RUE PICC: (11/1-) DC'd: LUE PICC (9/1-11/1 )  WOUNDS/DRAINS: Abdominal wound and fistula with wound manager in place dressing change T & F. RLQ Ostomy. IR perc cony. // DC: L Clay drain.  PT: Ambulate as tolerated   DISPO: SICU due to complicated hospital course. but will do vitals q4hrs (hold overnight), and CBC/BMP QD in setting of high fistula output.

## 2023-11-09 NOTE — PROGRESS NOTE ADULT - SUBJECTIVE AND OBJECTIVE BOX
78M with history of Crohn's, AFib/Flutter s/p DCCVs on amiodarone, remote ileocectomy and open appendectomy. Admitted (6/23) for SBO vs Crohns flare, s/p NGT decompression and s/p lap TRE converted to open TRE, SBR x 3, left in discontinuity with abthera vac on (6/27), RTOR for ileocolic resection, small bowel anastomosis, and abdominal wall closure on (6/28), c/b fluid collection s/p IR aspiration of perihepatic fluid on (7/3), c/b wound dehiscence s/p RTOR exlap, washout, ileocolic resection with end ileostomy, blow hole colostomy, red rubber from ileostomy to small bowel anastomosis; vicryl bridging mesh on (7/5) transferred to SICU postoperatively for hemodynamic monitoring, with hospital course complicated by periods of AMS most recently on 9/1 and associated with oliguria, severe ELVI and hyponatremia, which resolved but stepped back up for to SICU on 9/10 for acute AMS, intermittent hypoglycemia, AFib with RVR. Percutaneous Cholecystostomy placed on 9/11 for enlarged GB, PCT capped 10/5, and connected to drainage bag 10/25.    Percutaneous cholecystostomy tube with 575 cc output past 24 hr, previously 670 cc/24h    -Strict I/O, monitor output  -IR will continue to follow

## 2023-11-09 NOTE — PROGRESS NOTE ADULT - SUBJECTIVE AND OBJECTIVE BOX
SUBJECTIVE: Pt seen and examined at bedside this am by surgery team. Tolerating diet, reported some nausea in early morning, but has resolved spontaneously.     MEDICATIONS  (STANDING):  chlorhexidine 0.12% Liquid 15 milliLiter(s) Swish and Spit two times a day  chlorhexidine 2% Cloths 1 Application(s) Topical <User Schedule>  cholestyramine Powder (Sugar-Free) 4 Gram(s) Oral daily  diphenoxylate/atropine 2 Tablet(s) Oral every 6 hours  enoxaparin Injectable 100 milliGRAM(s) SubCutaneous every 12 hours  epoetin matt (EPOGEN) Injectable 90232 Unit(s) SubCutaneous every 7 days  glucagon  Injectable 1 milliGRAM(s) IntraMuscular once  influenza  Vaccine (HIGH DOSE) 0.7 milliLiter(s) IntraMuscular once  levothyroxine Injectable 40 MICROGram(s) IV Push at bedtime  loperamide 4 milliGRAM(s) Oral every 6 hours  metoprolol tartrate Injectable 5 milliGRAM(s) IV Push every 6 hours  Omegaven 10G/100ML 80 Gram(s) 80 Gram(s) (66.67 mL/Hr) IV Continuous <Continuous>  opium Tincture 6 milliGRAM(s) Oral every 6 hours  Parenteral Nutrition - Adult 1 Each TPN Continuous <Continuous>  ursodiol Capsule 300 milliGRAM(s) Oral every 8 hours  venlafaxine XR. 150 milliGRAM(s) Oral two times a day    MEDICATIONS  (PRN):  LORazepam   Injectable 0.5 milliGRAM(s) IV Push at bedtime PRN Anxiety  ondansetron Injectable 4 milliGRAM(s) IV Push every 8 hours PRN Nausea and/or Vomiting      Vital Signs Last 24 Hrs  T(C): 37.1 (09 Nov 2023 05:26), Max: 37.1 (09 Nov 2023 05:26)  T(F): 98.8 (09 Nov 2023 05:26), Max: 98.8 (09 Nov 2023 05:26)  HR: 91 (09 Nov 2023 08:00) (89 - 91)  BP: 131/61 (09 Nov 2023 04:00) (131/61 - 162/70)  BP(mean): 88 (09 Nov 2023 04:00) (88 - 100)  RR: 18 (09 Nov 2023 08:00) (17 - 26)  SpO2: 93% (09 Nov 2023 08:00) (93% - 100%)    Parameters below as of 09 Nov 2023 04:00  Patient On (Oxygen Delivery Method): room air      Physical Exam:  General: NAD, resting comfortably in bed  Pulmonary: Nonlabored breathing, no respiratory distress  Cardiovascular: NSR  Abdominal: soft, NT/ND, wound manager in place, ostomy in place, perc cony bilious  Extremities: WWP, normal strength  Neuro: A/O x 3, CNs II-XII grossly intact, no focal deficits        I&O's Detail    08 Nov 2023 07:01  -  09 Nov 2023 07:00  --------------------------------------------------------  IN:    freetext medication - Infusion: 800 mL    Oral Fluid: 240 mL    TPN (Total Parenteral Nutrition): 2635 mL  Total IN: 3675 mL    OUT:    Drain (mL): 150 mL    Drain (mL): 575 mL    Drain (mL): 1375 mL    Voided (mL): 2325 mL  Total OUT: 4425 mL    Total NET: -750 mL      09 Nov 2023 07:01  -  09 Nov 2023 09:16  --------------------------------------------------------  IN:    TPN (Total Parenteral Nutrition): 135 mL  Total IN: 135 mL    OUT:    freetext medication - Infusion: 0 mL  Total OUT: 0 mL    Total NET: 135 mL          LABS:                        8.1    6.20  )-----------( 195      ( 09 Nov 2023 07:15 )             25.9     11-09    137  |  103  |  30<H>  ----------------------------<  129<H>  4.0   |  26  |  0.92    Ca    8.4      09 Nov 2023 07:15  Phos  3.7     11-09  Mg     1.9     11-09    TPro  7.5  /  Alb  2.0<L>  /  TBili  3.9<H>  /  DBili  2.6<H>  /  AST  92<H>  /  ALT  70<H>  /  AlkPhos  149<H>  11-09      Urinalysis Basic - ( 09 Nov 2023 07:15 )    Color: x / Appearance: x / SG: x / pH: x  Gluc: 129 mg/dL / Ketone: x  / Bili: x / Urobili: x   Blood: x / Protein: x / Nitrite: x   Leuk Esterase: x / RBC: x / WBC x   Sq Epi: x / Non Sq Epi: x / Bacteria: x        RADIOLOGY & ADDITIONAL STUDIES:

## 2023-11-10 NOTE — PROGRESS NOTE ADULT - ATTENDING COMMENTS
Crohn's, AF, s/p sbr with ileocolic resection, end ileostomy, DVT, anemia, enteroatmospheric fistula  physical as above  tolerating small bites of solids  continue TPN  ursodiol and omegaven  weekly erythropoietin and add IV Fe for 3 days  continue metoprolol and eliquis Crohn's, AF, s/p sbr with ileocolic resection, end ileostomy, DVT, anemia, enteroatmospheric fistula  physical as above  tolerating small bites of solids  continue TPN  ursodiol and omegaven  weekly erythropoietin and add IV Fe for 3 days  continue metoprolol and eliquis  add 25% albumin for 3 days re edema

## 2023-11-10 NOTE — PROGRESS NOTE ADULT - ASSESSMENT
78M w PMH Crohn's, AFib/Flutter s/p DCCVs on amiodarone, remote ileocectomy and open appendectomy. Admitted (6/23) for SBO vs Crohns flare, s/p NGT decompression and s/p lap converted to open TRE, SBR x 3, left in discontinuity with abthera vac on (6/27), RTOR for ileocolic resection, small bowel anastomosis, and abdominal wall closure on (6/28), c/b fluid collection s/p IR aspiration of perihepatic fluid on (7/3), c/b wound dehiscence s/p RTOR exlap, washout, ileocolic resection with end ileostomy, blow hole colostomy, red rubber from ileostomy to small bowel anastomosis; vicryl bridging mesh on (7/5) transferred to SICU postoperatively for hemodynamic monitoring, with hospital course complicated by periods of severe ELVI and hyponatremia, which resolved but stepped back up for to SICU on (9/10) for acute AMS, intermittent hypoglycemia, AFib with RVR. Percutaneous Cholecystostomy placed on (9/11) for enlarged GB, PCT capped 10/5 and uncapped 10/25 for hyperbilirubinemia--improving after changes to TPN made. Right brachial DVT, left basillic and cephalic superficial thrombus on duplex 11/2, on Lovenox 100 BID.    NEURO: AMS (resolved), EEG neg. Hx of depression - cont Effexor. Insomnia: Melatonin PRN. Nausea: Zofran PRN. Anxiety: Lorazepam PRN.   HENT: Cepacol  CV: Hx Afib/flutter: s/p DCCV,  Amio held for Increased LFT'S. Continue Metoprolol 5q6. Hx HLD: Lipitor held given high LFTs. TTE  (7/18) - PASP 64mmHg, EF 65-70%,  Hx HTN -  holding Norvasc 10qd. Holding Losartan. Cont Lopressor 5 q6. Clinically euvolemic on exam - third spacing - starting albu,in 25% q8 x3 days. May need Lasix pending course  PULM: no resp distress on room air.   GI/FEN: High protein diet w/ bread. Cont Ensure max x1/day. Cont TPN/ lipids - Continue Omegavan to reduce cholecystasis:  (2.3 L/day volume) for high ostomy and ECF output: Cont Octreotide in TPN, Imodium, Lomotil, Tincture of Opium, Cholestyramine 10/18. Transaminitis of unknown origin, now stable. s/p Perc Choley on 9/11 - tube uncapped 10/25 for worsening hyperbilirubinemia. MRCP done as per Hepatology started on ursidiol, cont PPI. monitor drainage from fistula, bolus as needed to keep I&O even. GI following, considering Gattex.  : Voids.   ENDO: No need to check ISS. Hx hypothyroid: IV Synthroid, TSH wnl on 10/23  ID: Bld Cx's NGTD. As per Hepatology will get Fungal Bld cx's.  Currently off abx. Cont Nystatin powder // PREVIOUSLY had C. tertium, Lactobacillis from his IR cx 7/3, and candida albicans, lactobacillus, vanc sensitive E faecium, vanc resistant E gallinarum, vanc resistant E casseliflavus, lactobacillus from his OR cx 7/5. Completed course of abx with Imipenem (9/10-9/15). Imipenem (8/26--) imipenem (6/30-7/12, 7/23-7/30), Dapto (6/30-7/5 and 7/23-7/24). Empiric dapto (8/23-24) and cefepime (8/23-24).   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  LINES: PIVs, RUE PICC: (11/1-) DC'd: LUE PICC (9/1-11/1 )  WOUNDS/DRAINS: Abdominal wound and fistula with wound manager in place dressing change T & F. RLQ Ostomy. IR perc cony. // DC: L Clay drain.  PT: Ambulate as tolerated   DISPO: SICU due to complicated hospital course. but will do vitals q4hrs (hold overnight), and CBC/BMP QD in setting of high fistula output.

## 2023-11-10 NOTE — CHART NOTE - NSCHARTNOTEFT_GEN_A_CORE
Nutrition Interim Note:    Pt seen with IDT today during AM rounds. TPN w/ Omegaven lipids remain primary means to nutrition. Also ordered for PO diet with only certain food items [egg whites, bread, cottage cheese] with additional supplements. Output x 24hrs: 1575ml Dustin pouch output + 880ml perc cony drain output.   Per surgical team- ok to give pt regular PO diet per preference, emphasis placed on high protein diet, small meals. Will continue close monitoring of diet tolerance & outputs.     Bella Leung, MS, RDN, CDN [ext. [phone]]

## 2023-11-10 NOTE — PROGRESS NOTE ADULT - SUBJECTIVE AND OBJECTIVE BOX
SUBJECTIVE: Pt seen and examined at bedside this am by surgery team. Tolerating diet, increased output on pouch and perchole.     MEDICATIONS  (STANDING):  chlorhexidine 0.12% Liquid 15 milliLiter(s) Swish and Spit two times a day  chlorhexidine 2% Cloths 1 Application(s) Topical <User Schedule>  cholestyramine Powder (Sugar-Free) 4 Gram(s) Oral daily  diphenoxylate/atropine 2 Tablet(s) Oral every 6 hours  enoxaparin Injectable 100 milliGRAM(s) SubCutaneous every 12 hours  epoetin matt (EPOGEN) Injectable 69536 Unit(s) SubCutaneous every 7 days  glucagon  Injectable 1 milliGRAM(s) IntraMuscular once  influenza  Vaccine (HIGH DOSE) 0.7 milliLiter(s) IntraMuscular once  levothyroxine Injectable 40 MICROGram(s) IV Push at bedtime  loperamide 4 milliGRAM(s) Oral every 6 hours  metoprolol tartrate Injectable 5 milliGRAM(s) IV Push every 6 hours  Omegaven 10G/100ML 80 Gram(s),Omegaven 10G/100ML 80 Gram(s) 80 Gram(s) (66.67 mL/Hr) IV Continuous <Continuous>  opium Tincture 6 milliGRAM(s) Oral every 6 hours  Parenteral Nutrition - Adult 1 Each TPN Continuous <Continuous>  ursodiol Capsule 300 milliGRAM(s) Oral every 8 hours  venlafaxine XR. 150 milliGRAM(s) Oral two times a day    MEDICATIONS  (PRN):  LORazepam   Injectable 0.5 milliGRAM(s) IV Push at bedtime PRN Anxiety  ondansetron Injectable 4 milliGRAM(s) IV Push every 8 hours PRN Nausea and/or Vomiting      Vital Signs Last 24 Hrs  T(C): 36.6 (10 Nov 2023 02:11), Max: 36.6 (10 Nov 2023 02:11)  T(F): 97.9 (10 Nov 2023 02:11), Max: 97.9 (10 Nov 2023 02:11)  HR: 94 (10 Nov 2023 06:00) (91 - 94)  BP: 145/67 (10 Nov 2023 06:00) (124/62 - 158/72)  BP(mean): 97 (10 Nov 2023 06:00) (88 - 101)  RR: 21 (10 Nov 2023 06:00) (14 - 22)  SpO2: 95% (10 Nov 2023 06:00) (93% - 100%)    Parameters below as of 10 Nov 2023 06:00  Patient On (Oxygen Delivery Method): room air        Physical Exam:  General: NAD, resting comfortably in bed  Pulmonary: Nonlabored breathing, no respiratory distress  Cardiovascular: NSR  Abdominal: soft, NT/ND, wound manager in place, ostomy in place, perc cony bilious  Extremities: WWP, normal strength  Neuro: A/O x 3, CNs II-XII grossly intact, no focal deficits      I&O's Detail    08 Nov 2023 07:01  -  09 Nov 2023 07:00  --------------------------------------------------------  IN:    freetext medication - Infusion: 800 mL    Oral Fluid: 240 mL    TPN (Total Parenteral Nutrition): 2635 mL  Total IN: 3675 mL    OUT:    Drain (mL): 150 mL    Drain (mL): 575 mL    Drain (mL): 1375 mL    Voided (mL): 2325 mL  Total OUT: 4425 mL    Total NET: -750 mL      09 Nov 2023 07:01  -  10 Nov 2023 06:43  --------------------------------------------------------  IN:    freetext medication - Infusion: 1000.4 mL    TPN (Total Parenteral Nutrition): 2570 mL  Total IN: 3570.4 mL    OUT:    Drain (mL): 1575 mL    Drain (mL): 880 mL    Drain (mL): 125 mL    freetext medication - Infusion: 0 mL    Voided (mL): 1180 mL  Total OUT: 3760 mL    Total NET: -189.6 mL          LABS:                        8.1    6.20  )-----------( 195      ( 09 Nov 2023 07:15 )             25.9     11-09    137  |  103  |  30<H>  ----------------------------<  129<H>  4.0   |  26  |  0.92    Ca    8.4      09 Nov 2023 07:15  Phos  3.7     11-09  Mg     1.9     11-09    TPro  7.5  /  Alb  2.0<L>  /  TBili  3.9<H>  /  DBili  2.6<H>  /  AST  92<H>  /  ALT  70<H>  /  AlkPhos  149<H>  11-09      Urinalysis Basic - ( 09 Nov 2023 07:15 )    Color: x / Appearance: x / SG: x / pH: x  Gluc: 129 mg/dL / Ketone: x  / Bili: x / Urobili: x   Blood: x / Protein: x / Nitrite: x   Leuk Esterase: x / RBC: x / WBC x   Sq Epi: x / Non Sq Epi: x / Bacteria: x

## 2023-11-10 NOTE — ADVANCED PRACTICE NURSE CONSULT - ASSESSMENT
New Coloplast fistula/wound manager applied over fistula site at mid abdomen. Effluent thicker, pt eating small amounts of food. Hypergranulation in center of wound bed, bleeds easily when cleansed, epitheliazation at periwound. Periwound protected with stoma paste and stoma rings then fistula/wound manager applied over site. New 1 3/4" [place] 2 piece appliance placed over stoma, red rubber catheter in place.

## 2023-11-10 NOTE — PROGRESS NOTE ADULT - SUBJECTIVE AND OBJECTIVE BOX
78M with history of Crohn's, AFib/Flutter s/p DCCVs on amiodarone, remote ileocectomy and open appendectomy. Admitted (6/23) for SBO vs Crohns flare, s/p NGT decompression and s/p lap TRE converted to open TRE, SBR x 3, left in discontinuity with abthera vac on (6/27), RTOR for ileocolic resection, small bowel anastomosis, and abdominal wall closure on (6/28), c/b fluid collection s/p IR aspiration of perihepatic fluid on (7/3), c/b wound dehiscence s/p RTOR exlap, washout, ileocolic resection with end ileostomy, blow hole colostomy, red rubber from ileostomy to small bowel anastomosis; vicryl bridging mesh on (7/5) transferred to SICU postoperatively for hemodynamic monitoring, with hospital course complicated by periods of AMS most recently on 9/1 and associated with oliguria, severe ELVI and hyponatremia, which resolved but stepped back up for to SICU on 9/10 for acute AMS, intermittent hypoglycemia, AFib with RVR. Percutaneous Cholecystostomy placed on 9/11 for enlarged GB, PCT capped 10/5, and connected to drainage bag 10/25.    Percutaneous cholecystostomy tube with 880 cc output past 24 hr, previously 575 cc/24h    -Strict I/O, monitor output  -IR will continue to follow

## 2023-11-10 NOTE — PROGRESS NOTE ADULT - SUBJECTIVE AND OBJECTIVE BOX
SICU PROGRESS NOTE    ON: NAEO      SUBJECTIVE: Pt seen and examined at bedside this am by ICU team. Patient is lying comfortably in bed with no complaints. Tolerating diet, pain well controlled with current regimen. Patient denies fever, nausea, vomiting, chest pain, and shortness of breath. Patient is passing gas and having bowel movements.       MEDICATIONS  (STANDING):  chlorhexidine 0.12% Liquid 15 milliLiter(s) Swish and Spit two times a day  chlorhexidine 2% Cloths 1 Application(s) Topical <User Schedule>  cholestyramine Powder (Sugar-Free) 4 Gram(s) Oral daily  diphenoxylate/atropine 2 Tablet(s) Oral every 6 hours  enoxaparin Injectable 100 milliGRAM(s) SubCutaneous every 12 hours  epoetin matt (EPOGEN) Injectable 09345 Unit(s) SubCutaneous every 7 days  glucagon  Injectable 1 milliGRAM(s) IntraMuscular once  influenza  Vaccine (HIGH DOSE) 0.7 milliLiter(s) IntraMuscular once  levothyroxine Injectable 40 MICROGram(s) IV Push at bedtime  loperamide 4 milliGRAM(s) Oral every 6 hours  metoprolol tartrate Injectable 5 milliGRAM(s) IV Push every 6 hours  Omegaven 10G/100ML 80 Gram(s),Omegaven 10G/100ML 80 Gram(s) 80 Gram(s) (66.67 mL/Hr) IV Continuous <Continuous>  opium Tincture 6 milliGRAM(s) Oral every 6 hours  Parenteral Nutrition - Adult 1 Each TPN Continuous <Continuous>  ursodiol Capsule 300 milliGRAM(s) Oral every 8 hours  venlafaxine XR. 150 milliGRAM(s) Oral two times a day    MEDICATIONS  (PRN):  LORazepam   Injectable 0.5 milliGRAM(s) IV Push at bedtime PRN Anxiety  ondansetron Injectable 4 milliGRAM(s) IV Push every 8 hours PRN Nausea and/or Vomiting      Drips:     ICU Vital Signs Last 24 Hrs  T(C): 36.6 (10 Nov 2023 02:11), Max: 36.6 (10 Nov 2023 02:11)  T(F): 97.9 (10 Nov 2023 02:11), Max: 97.9 (10 Nov 2023 02:11)  HR: 94 (10 Nov 2023 06:00) (91 - 94)  BP: 145/67 (10 Nov 2023 06:00) (124/62 - 158/72)  BP(mean): 97 (10 Nov 2023 06:00) (88 - 101)  ABP: --  ABP(mean): --  RR: 21 (10 Nov 2023 06:00) (14 - 22)  SpO2: 95% (10 Nov 2023 06:00) (93% - 100%)    O2 Parameters below as of 10 Nov 2023 06:00  Patient On (Oxygen Delivery Method): room air            Physical Exam:  General: NAD  HEENT: NC/AT, EOMI, PERRLA, normal hearing, no oral lesions, neck supple w/o LAD  Pulmonary: Nonlabored breathing, no respiratory distress, CTA-B  Cardiovascular: NSR, no murmurs  Abdominal: soft, NT/ND, +BS, no organomegaly  Extremities: WWP, 5/5 strength x 4, no clubbing/cyanosis/edema  Neuro: A/O x3, CNs II-XII grossly intact, normal motor/sensation, no focal deficits  Pulses: palpable distal pulses    Lines/tubes/drains:  [place]:	      Vent settings:      I&O's Summary    08 Nov 2023 07:01  -  09 Nov 2023 07:00  --------------------------------------------------------  IN: 3675 mL / OUT: 4425 mL / NET: -750 mL    09 Nov 2023 07:01  -  10 Nov 2023 06:25  --------------------------------------------------------  IN: 3570.4 mL / OUT: 3760 mL / NET: -189.6 mL        LABS:                        8.1    6.20  )-----------( 195      ( 09 Nov 2023 07:15 )             25.9     11-09    137  |  103  |  30<H>  ----------------------------<  129<H>  4.0   |  26  |  0.92    Ca    8.4      09 Nov 2023 07:15  Phos  3.7     11-09  Mg     1.9     11-09    TPro  7.5  /  Alb  2.0<L>  /  TBili  3.9<H>  /  DBili  2.6<H>  /  AST  92<H>  /  ALT  70<H>  /  AlkPhos  149<H>  11-09      Urinalysis Basic - ( 09 Nov 2023 07:15 )    Color: x / Appearance: x / SG: x / pH: x  Gluc: 129 mg/dL / Ketone: x  / Bili: x / Urobili: x   Blood: x / Protein: x / Nitrite: x   Leuk Esterase: x / RBC: x / WBC x   Sq Epi: x / Non Sq Epi: x / Bacteria: x      CAPILLARY BLOOD GLUCOSE        LIVER FUNCTIONS - ( 09 Nov 2023 07:15 )  Alb: 2.0 g/dL / Pro: 7.5 g/dL / ALK PHOS: 149 U/L / ALT: 70 U/L / AST: 92 U/L / GGT: x             Cultures:    RADIOLOGY & ADDITIONAL STUDIES:     SICU PROGRESS NOTE    ON: NAEO      SUBJECTIVE: Patient seen and examined at bedside with ICU team during AM rounds. Patient without acute complaints this AM. Denies fevers, chills, chest pain, SOB. States he has been tolerating meals with minimal nausea. States he has been ambulating.       MEDICATIONS  (STANDING):  chlorhexidine 0.12% Liquid 15 milliLiter(s) Swish and Spit two times a day  chlorhexidine 2% Cloths 1 Application(s) Topical <User Schedule>  cholestyramine Powder (Sugar-Free) 4 Gram(s) Oral daily  diphenoxylate/atropine 2 Tablet(s) Oral every 6 hours  enoxaparin Injectable 100 milliGRAM(s) SubCutaneous every 12 hours  epoetin matt (EPOGEN) Injectable 96622 Unit(s) SubCutaneous every 7 days  glucagon  Injectable 1 milliGRAM(s) IntraMuscular once  influenza  Vaccine (HIGH DOSE) 0.7 milliLiter(s) IntraMuscular once  levothyroxine Injectable 40 MICROGram(s) IV Push at bedtime  loperamide 4 milliGRAM(s) Oral every 6 hours  metoprolol tartrate Injectable 5 milliGRAM(s) IV Push every 6 hours  Omegaven 10G/100ML 80 Gram(s),Omegaven 10G/100ML 80 Gram(s) 80 Gram(s) (66.67 mL/Hr) IV Continuous <Continuous>  opium Tincture 6 milliGRAM(s) Oral every 6 hours  Parenteral Nutrition - Adult 1 Each TPN Continuous <Continuous>  ursodiol Capsule 300 milliGRAM(s) Oral every 8 hours  venlafaxine XR. 150 milliGRAM(s) Oral two times a day    MEDICATIONS  (PRN):  LORazepam   Injectable 0.5 milliGRAM(s) IV Push at bedtime PRN Anxiety  ondansetron Injectable 4 milliGRAM(s) IV Push every 8 hours PRN Nausea and/or Vomiting    Drips: TPN    ICU Vital Signs Last 24 Hrs  T(C): 36.6 (10 Nov 2023 02:11), Max: 36.6 (10 Nov 2023 02:11)  T(F): 97.9 (10 Nov 2023 02:11), Max: 97.9 (10 Nov 2023 02:11)  HR: 94 (10 Nov 2023 06:00) (91 - 94)  BP: 145/67 (10 Nov 2023 06:00) (124/62 - 158/72)  BP(mean): 97 (10 Nov 2023 06:00) (88 - 101)  RR: 21 (10 Nov 2023 06:00) (14 - 22)  SpO2: 95% (10 Nov 2023 06:00) (93% - 100%)    O2 Parameters below as of 10 Nov 2023 06:00  Patient On (Oxygen Delivery Method): room air            Physical Exam:  General: Resting in bed, NAD  HEENT: NC/AT, EOMI, Improved scleral icterus. Mucous membranes moist  Pulmonary: No respiratory distress. Lungs CTA with mild decrease breath sounds at bases b/l  Cardiovascular: NSR, no murmurs  Abdominal: soft, nondistended, nontender. RUQ perc cony drain with bilious out and RUQ ostomy with frothy output and red rubber noted. Midline abdominal wound weal healing with wound manager without leakage or surrounding erythema  Extremities: WWP, 5/5 strength x 4, trace pedal edema appreciated in b/l LE. Appropriate capillary refill  Neuro: A/O x3, CNs II-XII grossly intact, normal motor/sensation, no focal deficits  Pulses: palpable distal pulses    Lines/tubes/drains: RUE PICC line      I&O's Summary    08 Nov 2023 07:01  -  09 Nov 2023 07:00  --------------------------------------------------------  IN: 3675 mL / OUT: 4425 mL / NET: -750 mL    09 Nov 2023 07:01  -  10 Nov 2023 06:25  --------------------------------------------------------  IN: 3570.4 mL / OUT: 3760 mL / NET: -189.6 mL        LABS:                        8.1    6.20  )-----------( 195      ( 09 Nov 2023 07:15 )             25.9     11-09    137  |  103  |  30<H>  ----------------------------<  129<H>  4.0   |  26  |  0.92    Ca    8.4      09 Nov 2023 07:15  Phos  3.7     11-09  Mg     1.9     11-09    TPro  7.5  /  Alb  2.0<L>  /  TBili  3.9<H>  /  DBili  2.6<H>  /  AST  92<H>  /  ALT  70<H>  /  AlkPhos  149<H>  11-09      Urinalysis Basic - ( 09 Nov 2023 07:15 )    Color: x / Appearance: x / SG: x / pH: x  Gluc: 129 mg/dL / Ketone: x  / Bili: x / Urobili: x   Blood: x / Protein: x / Nitrite: x   Leuk Esterase: x / RBC: x / WBC x   Sq Epi: x / Non Sq Epi: x / Bacteria: x      CAPILLARY BLOOD GLUCOSE        LIVER FUNCTIONS - ( 09 Nov 2023 07:15 )  Alb: 2.0 g/dL / Pro: 7.5 g/dL / ALK PHOS: 149 U/L / ALT: 70 U/L / AST: 92 U/L / GGT: x             Cultures:    RADIOLOGY & ADDITIONAL STUDIES:

## 2023-11-10 NOTE — PROGRESS NOTE ADULT - ASSESSMENT
78 M w/ Crohn's, AFib/Flutter s/p DCCVs on amiodarone, remote ileocectomy and open appendectomy. Admitted (6/23) for SBO vs Crohns flare, s/p NGT decompression and s/p lap TRE converted to open TRE, SBR x 3, left in discontinuity with abthera vac on (6/27), RTOR for ileocolic resection, small bowel anastomosis, and abdominal wall closure on (6/28), c/b fluid collection s/p IR aspiration of perihepatic fluid on (7/3), c/b wound dehiscence s/p RTOR exlap, washout, ileocolic resection with end ileostomy, blow hole colostomy, red rubber from ileostomy to small bowel anastomosis; vicryl bridging mesh on (7/5) transferred to SICU postoperatively for hemodynamic monitoring, with hospital course complicated by periods of AMS most recently on 9/1 and associated with oliguria, severe ELVI and hyponatremia, which resolved but stepped back up for to SICU on 9/10 for acute AMS, intermittent hypoglycemia, AFib with RVR. Percutaneous Cholecystostomy placed on 9/11 for enlarged GB, PCT capped 10/5. 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 Left Clay drain removed 11/7. Tolerating regular diet. Hb 8.3 stable after 1 PRBC. Increased output on fistula 1575 fro 24h and Perchole drain 880 in 24h.     Plan   - Strict I&Os, replace PRN  - Continue SICU care   - Team 5 will follow

## 2023-11-11 NOTE — PROGRESS NOTE ADULT - ASSESSMENT
A/P  stable.  Fistula output and RUQ drain output reasonable.  TPN continues  Po intake well tolerated  Labs stable.    Continue as before.  Ambulate as able.  Continue strict I/ O's

## 2023-11-11 NOTE — PROGRESS NOTE ADULT - SUBJECTIVE AND OBJECTIVE BOX
INTERVAL HPI/OVERNIGHT EVENTS:    STATUS POST:      POST OPERATIVE DAY #:     SUBJECTIVE:      enoxaparin Injectable 100 milliGRAM(s) SubCutaneous every 12 hours  metoprolol tartrate Injectable 5 milliGRAM(s) IV Push every 6 hours      Vital Signs Last 24 Hrs  T(C): 36.7 (11 Nov 2023 05:47), Max: 36.7 (11 Nov 2023 00:55)  T(F): 98 (11 Nov 2023 05:47), Max: 98 (11 Nov 2023 00:55)  HR: 94 (11 Nov 2023 06:00) (90 - 96)  BP: 149/79 (11 Nov 2023 06:00) (123/64 - 151/72)  BP(mean): 108 (11 Nov 2023 06:00) (87 - 108)  RR: 17 (11 Nov 2023 06:00) (17 - 19)  SpO2: 95% (11 Nov 2023 06:00) (92% - 95%)    Parameters below as of 11 Nov 2023 06:00  Patient On (Oxygen Delivery Method): room air      I&O's Detail    10 Nov 2023 07:01  -  11 Nov 2023 07:00  --------------------------------------------------------  IN:    Albumin 25%  -  50 mL: 150 mL    Albumin 5%  - 500 mL: 250 mL    freetext medication - Infusion: 66.7 mL    freetext medication - Infusion: 666 mL    IV PiggyBack: 100 mL    TPN (Total Parenteral Nutrition): 2635 mL  Total IN: 3867.7 mL    OUT:    Drain (mL): 75 mL    Drain (mL): 700 mL    Drain (mL): 1575 mL    Voided (mL): 1575 mL  Total OUT: 3925 mL    Total NET: -57.3 mL          General: NAD, resting comfortably in bed  C/V: NSR  Pulm: Nonlabored breathing, no respiratory distress  Abd: soft, NT/ND.  Extrem: WWP, no edema, SCDs in place  Drains:  [place]:      LABS:                RADIOLOGY & ADDITIONAL STUDIES:   SUBJECTIVE: Patient seen and examined at bedside with chief resident. Patient states he is feeling well and that his pain is well controlled. He is tolerating oral intake without nausea or vomiting.      enoxaparin Injectable 100 milliGRAM(s) SubCutaneous every 12 hours  metoprolol tartrate Injectable 5 milliGRAM(s) IV Push every 6 hours      Vital Signs Last 24 Hrs  T(C): 36.7 (11 Nov 2023 05:47), Max: 36.7 (11 Nov 2023 00:55)  T(F): 98 (11 Nov 2023 05:47), Max: 98 (11 Nov 2023 00:55)  HR: 94 (11 Nov 2023 06:00) (90 - 96)  BP: 149/79 (11 Nov 2023 06:00) (123/64 - 151/72)  BP(mean): 108 (11 Nov 2023 06:00) (87 - 108)  RR: 17 (11 Nov 2023 06:00) (17 - 19)  SpO2: 95% (11 Nov 2023 06:00) (92% - 95%)    Parameters below as of 11 Nov 2023 06:00  Patient On (Oxygen Delivery Method): room air      I&O's Detail    10 Nov 2023 07:01  -  11 Nov 2023 07:00  --------------------------------------------------------  IN:    Albumin 25%  -  50 mL: 150 mL    Albumin 5%  - 500 mL: 250 mL    freetext medication - Infusion: 66.7 mL    freetext medication - Infusion: 666 mL    IV PiggyBack: 100 mL    TPN (Total Parenteral Nutrition): 2635 mL  Total IN: 3867.7 mL    OUT:    Drain (mL): 75 mL    Drain (mL): 700 mL    Drain (mL): 1575 mL    Voided (mL): 1575 mL  Total OUT: 3925 mL    Total NET: -57.3 mL          General: NAD, resting comfortably in bed  C/V: NSR  Pulm: Nonlabored breathing, no respiratory distress  Abd: soft, NT/ND, midline wound well healing with wound manager in place, slight red tinge to output. R sided JOYCE with bilious output. EC fistula with dark brown output  Extrem: WWP, no edema, SCDs in place        LABS:                RADIOLOGY & ADDITIONAL STUDIES:

## 2023-11-11 NOTE — PROGRESS NOTE ADULT - SUBJECTIVE AND OBJECTIVE BOX
Covering for Dr. Peterson    [facility] Psych[facility]  Taking po food in addition to TPN. No N/V.   No complaints this AM  Awake and alert.   OOB and walking yesterday and daily.    Vital Signs Last 24 Hrs  T(C): 36.7 (11 Nov 2023 05:47), Max: 36.7 (11 Nov 2023 00:55)  T(F): 98 (11 Nov 2023 05:47), Max: 98 (11 Nov 2023 00:55)  HR: 94 (11 Nov 2023 06:00) (90 - 96)  BP: 149/79 (11 Nov 2023 06:00) (123/64 - 151/72)  BP(mean): 108 (11 Nov 2023 06:00) (87 - 108)  RR: 17 (11 Nov 2023 06:00) (17 - 19)  SpO2: 95% (11 Nov 2023 06:00) (92% - 95%)    Parameters below as of 11 Nov 2023 06:00  Patient On (Oxygen Delivery Method): room air    abd: wound manager in place with no leakage, small amount of serosanguinous d/c noted in bag and wound, minor; RUQ drain site with appliance on it that is holding up well.  not distended, non tender  ext: without calf tenderness    UO 1575 ml/24 hr;  600 ml/last shift  wound manager covering fistula: 1575 ml/24 hr;  750 ml/last shift  cony/RUQ drain: 700 ml/24 hrs; 375 ml/12 hrs                          8.4    6.29  )-----------( 177      ( 11 Nov 2023 07:24 )             27.9   11-11    137  |  101  |  28<H>  ----------------------------<  115<H>  4.2   |  27  |  0.90    Ca    8.8      11 Nov 2023 07:24  Phos  3.8     11-11  Mg     2.0     11-11

## 2023-11-11 NOTE — PROGRESS NOTE ADULT - SUBJECTIVE AND OBJECTIVE BOX
ON: Feels more hydrated after albumin. C/f bleed in eakins pouch, appeared purplish red and endorsed eating blueberrys last night. Sent occult blood test to r/o. +positive for blood.    SUBJECTIVE: Patient was seen and examined at bedside with SICU attending. Overall feels well, in good spirits, tolerating PO without n/v.    MEDICATIONS  (STANDING):  albumin human 25% IVPB 50 milliLiter(s) IV Intermittent every 8 hours  chlorhexidine 0.12% Liquid 15 milliLiter(s) Swish and Spit two times a day  chlorhexidine 2% Cloths 1 Application(s) Topical <User Schedule>  cholestyramine Powder (Sugar-Free) 4 Gram(s) Oral daily  diphenoxylate/atropine 2 Tablet(s) Oral every 6 hours  enoxaparin Injectable 100 milliGRAM(s) SubCutaneous every 12 hours  epoetin matt (EPOGEN) Injectable 28663 Unit(s) SubCutaneous every 7 days  glucagon  Injectable 1 milliGRAM(s) IntraMuscular once  influenza  Vaccine (HIGH DOSE) 0.7 milliLiter(s) IntraMuscular once  iron sucrose IVPB 200 milliGRAM(s) IV Intermittent every 24 hours  levothyroxine Injectable 40 MICROGram(s) IV Push at bedtime  loperamide 4 milliGRAM(s) Oral every 6 hours  metoprolol tartrate Injectable 5 milliGRAM(s) IV Push every 6 hours  Omegaven 10G/100ML 80 Gram(s) 80 Gram(s) (66.67 mL/Hr) IV Continuous <Continuous>  opium Tincture 6 milliGRAM(s) Oral every 6 hours  Parenteral Nutrition - Adult 1 Each TPN Continuous <Continuous>  ursodiol Capsule 300 milliGRAM(s) Oral every 8 hours  venlafaxine XR. 150 milliGRAM(s) Oral two times a day    MEDICATIONS  (PRN):  LORazepam   Injectable 0.5 milliGRAM(s) IV Push at bedtime PRN Anxiety  ondansetron Injectable 4 milliGRAM(s) IV Push every 8 hours PRN Nausea and/or Vomiting      Drips:     ICU Vital Signs Last 24 Hrs  T(C): 36.7 (11 Nov 2023 05:47), Max: 36.7 (11 Nov 2023 00:55)  T(F): 98 (11 Nov 2023 05:47), Max: 98 (11 Nov 2023 00:55)  HR: 94 (11 Nov 2023 06:00) (90 - 96)  BP: 149/79 (11 Nov 2023 06:00) (123/64 - 151/72)  BP(mean): 108 (11 Nov 2023 06:00) (87 - 108)  ABP: --  ABP(mean): --  RR: 17 (11 Nov 2023 06:00) (17 - 19)  SpO2: 95% (11 Nov 2023 06:00) (92% - 95%)    O2 Parameters below as of 11 Nov 2023 06:00  Patient On (Oxygen Delivery Method): room air            Physical Exam:  General: NAD  HEENT: NC/AT, EOMI, PERRLA, normal hearing, no oral lesions, neck supple w/o LAD  Pulmonary: Nonlabored breathing, no respiratory distress, CTA-B  Cardiovascular: NSR, no murmurs  Abdominal: soft, nontender, nondistended. Midline wound bed well-healing with overlying wound manager, no signs of purulence or erythema. R sided JOYCE drain with scant bilious output. EC fistula draining serous dark brown liquid.  Extremities: WWP, 5/5 strength x 4, no clubbing/cyanosis, 1+ pedal edema in b/l LE.  Neuro: A/O x3, CNs II-XII grossly intact, normal motor/sensation, no focal deficits  Pulses: palpable distal pulses      I&O's Summary    10 Nov 2023 07:01  -  11 Nov 2023 07:00  --------------------------------------------------------  IN: 3867.7 mL / OUT: 3925 mL / NET: -57.3 mL    11 Nov 2023 07:01  -  11 Nov 2023 09:17  --------------------------------------------------------  IN: 270 mL / OUT: 400 mL / NET: -130 mL        LABS:                        8.4    6.29  )-----------( 177      ( 11 Nov 2023 07:24 )             27.9     11-11    137  |  101  |  28<H>  ----------------------------<  115<H>  4.2   |  27  |  0.90    Ca    8.8      11 Nov 2023 07:24  Phos  3.8     11-11  Mg     2.0     11-11        Urinalysis Basic - ( 11 Nov 2023 07:24 )    Color: x / Appearance: x / SG: x / pH: x  Gluc: 115 mg/dL / Ketone: x  / Bili: x / Urobili: x   Blood: x / Protein: x / Nitrite: x   Leuk Esterase: x / RBC: x / WBC x   Sq Epi: x / Non Sq Epi: x / Bacteria: x      CAPILLARY BLOOD GLUCOSE            Cultures:    RADIOLOGY & ADDITIONAL STUDIES:

## 2023-11-11 NOTE — PROGRESS NOTE ADULT - ASSESSMENT
78M w PMH Crohn's, AFib/Flutter s/p DCCVs on amiodarone, remote ileocectomy and open appendectomy. Admitted (6/23) for SBO vs Crohns flare, s/p NGT decompression and s/p lap converted to open TRE, SBR x 3, left in discontinuity with abthera vac on (6/27), RTOR for ileocolic resection, small bowel anastomosis, and abdominal wall closure on (6/28), c/b fluid collection s/p IR aspiration of perihepatic fluid on (7/3), c/b wound dehiscence s/p RTOR exlap, washout, ileocolic resection with end ileostomy, blow hole colostomy, red rubber from ileostomy to small bowel anastomosis; vicryl bridging mesh on (7/5) transferred to SICU postoperatively for hemodynamic monitoring, with hospital course complicated by periods of severe ELVI and hyponatremia, which resolved but stepped back up for to SICU on (9/10) for acute AMS, intermittent hypoglycemia, AFib with RVR. Percutaneous Cholecystostomy placed on (9/11) for enlarged GB, PCT capped 10/5 and uncapped 10/25 for hyperbilirubinemia--improving after changes to TPN made. Right brachial DVT, left basillic and cephalic superficial thrombus on duplex 11/2, on Lovenox 100 BID.    NEURO: AMS (resolved), EEG neg. Hx of depression - cont Effexor. Insomnia: Melatonin PRN. Nausea: Zofran PRN. Anxiety: Lorazepam PRN.   HENT: Cepacol  CV: Hx Afib/flutter: s/p DCCV,  Amio held for Increased LFT'S. Continue Metoprolol 5q6. Hx HLD: Lipitor held given high LFTs. TTE  (7/18) - PASP 64mmHg, EF 65-70%,  Hx HTN -  holding Norvasc 10qd. Holding Losartan. Cont Lopressor 5 q6. Continue Albumin 25% q8 x3 days. Lasix pending course.  PULM: no resp distress on room air.   GI/FEN: High protein diet w/ bread. Cont Ensure max x1/day. Cont TPN/ lipids - Continue Omegavan to reduce cholecystasis:  (2.3 L/day volume) for high ostomy and ECF output: Cont Octreotide in TPN, Imodium, Lomotil, Tincture of Opium, Cholestyramine 10/18. Transaminitis of unknown origin, now stable. s/p Perc Choley on 9/11 - tube uncapped 10/25 for worsening hyperbilirubinemia. MRCP done as per Hepatology started on ursidiol, cont PPI. monitor drainage from fistula, bolus as needed to keep I&O even. GI following, considering Gattex. Overnight ECF output red/brown concerning for blood, fecal occult blood positive at the time. Pending AM CBC.  : Voids.   ENDO: No need to check ISS. Hx hypothyroid: IV Synthroid, TSH wnl on 10/23  ID: Bld Cx's NGTD. As per Hepatology will get Fungal Bld cx's.  Currently off abx. Cont Nystatin powder // PREVIOUSLY had C. tertium, Lactobacillis from his IR cx 7/3, and candida albicans, lactobacillus, vanc sensitive E faecium, vanc resistant E gallinarum, vanc resistant E casseliflavus, lactobacillus from his OR cx 7/5. Completed course of abx with Imipenem (9/10-9/15). Imipenem (8/26--) imipenem (6/30-7/12, 7/23-7/30), Dapto (6/30-7/5 and 7/23-7/24). Empiric dapto (8/23-24) and cefepime (8/23-24).   PPX: SCDs, Therapeutic lovenox 2/2 R brachial vein DVT (11/3 - )  HEME: continue Iron Sucrose 200 qd x 3 days for chronic anemia.  LINES: PIVs, RUE PICC: (11/1-) DC'd: LUE PICC (9/1-11/1 )  WOUNDS/DRAINS: Abdominal wound and fistula with wound manager in place dressing change T & F. RLQ Ostomy. IR perc cony. // DC: L Clay drain.  PT: Ambulate as tolerated   DISPO: SICU due to complicated hospital course. but will do vitals q4hrs (hold overnight), and CBC/BMP QD in setting of high fistula output.

## 2023-11-12 NOTE — PROGRESS NOTE ADULT - SUBJECTIVE AND OBJECTIVE BOX
SUBJECTIVE: Pt seen and examined at bedside this am by surgery team. Tolerating diet. No events overnight.     MEDICATIONS  (STANDING):  albumin human 25% IVPB 50 milliLiter(s) IV Intermittent every 8 hours  chlorhexidine 0.12% Liquid 15 milliLiter(s) Swish and Spit two times a day  chlorhexidine 2% Cloths 1 Application(s) Topical <User Schedule>  cholestyramine Powder (Sugar-Free) 4 Gram(s) Oral daily  diphenoxylate/atropine 2 Tablet(s) Oral every 6 hours  enoxaparin Injectable 100 milliGRAM(s) SubCutaneous every 12 hours  epoetin matt (EPOGEN) Injectable 26195 Unit(s) SubCutaneous every 7 days  glucagon  Injectable 1 milliGRAM(s) IntraMuscular once  influenza  Vaccine (HIGH DOSE) 0.7 milliLiter(s) IntraMuscular once  iron sucrose IVPB 200 milliGRAM(s) IV Intermittent every 24 hours  levothyroxine Injectable 40 MICROGram(s) IV Push at bedtime  loperamide 4 milliGRAM(s) Oral every 6 hours  metoprolol tartrate Injectable 5 milliGRAM(s) IV Push every 6 hours  Omegaven 10G/100ml 80 Gram(s) 80 Gram(s) (66.67 mL/Hr) IV Continuous <Continuous>  Omegaven 10G/100ML 80 Gram(s) 80 Gram(s) (66.67 mL/Hr) IV Continuous <Continuous>  opium Tincture 6 milliGRAM(s) Oral every 6 hours  Parenteral Nutrition - Adult 1 Each TPN Continuous <Continuous>  Parenteral Nutrition - Adult 1 Each TPN Continuous <Continuous>  ursodiol Capsule 300 milliGRAM(s) Oral every 8 hours  venlafaxine XR. 150 milliGRAM(s) Oral two times a day    MEDICATIONS  (PRN):  LORazepam   Injectable 0.5 milliGRAM(s) IV Push at bedtime PRN Anxiety  ondansetron Injectable 4 milliGRAM(s) IV Push every 8 hours PRN Nausea and/or Vomiting      Vital Signs Last 24 Hrs  T(C): 37.1 (12 Nov 2023 01:04), Max: 37.1 (12 Nov 2023 01:04)  T(F): 98.8 (12 Nov 2023 01:04), Max: 98.8 (12 Nov 2023 01:04)  HR: 94 (12 Nov 2023 08:46) (86 - 95)  BP: 141/72 (12 Nov 2023 08:46) (138/64 - 156/73)  BP(mean): 100 (12 Nov 2023 08:46) (92 - 103)  RR: 17 (12 Nov 2023 08:46) (16 - 24)  SpO2: 98% (12 Nov 2023 08:46) (96% - 100%)    Parameters below as of 12 Nov 2023 08:46  Patient On (Oxygen Delivery Method): room air          Physical Exam:  General: NAD, resting comfortably in bed  Pulmonary: Nonlabored breathing, no respiratory distress  Cardiovascular: NSR  Abdominal: soft, NT/ND, wound manager in place, ostomy in place, perc cony bilious  Extremities: WWP, normal strength  Neuro: A/O x 3, CNs II-XII grossly intact, no focal deficits          I&O's Detail    11 Nov 2023 07:01  -  12 Nov 2023 07:00  --------------------------------------------------------  IN:    Albumin 25%  -  50 mL: 100 mL    freetext medication - Infusion: 799.2 mL    IV PiggyBack: 200 mL    TPN (Total Parenteral Nutrition): 2700 mL  Total IN: 3799.2 mL    OUT:    Drain (mL): 1090 mL    Drain (mL): 700 mL    Drain (mL): 60 mL    Voided (mL): 1850 mL  Total OUT: 3700 mL    Total NET: 99.2 mL      12 Nov 2023 07:01  -  12 Nov 2023 12:07  --------------------------------------------------------  IN:    TPN (Total Parenteral Nutrition): 135 mL  Total IN: 135 mL    OUT:  Total OUT: 0 mL    Total NET: 135 mL          LABS:                        8.3    7.15  )-----------( 173      ( 12 Nov 2023 05:26 )             26.4     11-11    136  |  101  |  29<H>  ----------------------------<  127<H>  4.3   |  28  |  0.90    Ca    8.9      11 Nov 2023 09:24  Phos  3.7     11-11  Mg     1.9     11-11      PT/INR - ( 11 Nov 2023 08:01 )   PT: 14.0 sec;   INR: 1.24          PTT - ( 11 Nov 2023 08:01 )  PTT:43.5 sec  Urinalysis Basic - ( 11 Nov 2023 09:24 )    Color: x / Appearance: x / SG: x / pH: x  Gluc: 127 mg/dL / Ketone: x  / Bili: x / Urobili: x   Blood: x / Protein: x / Nitrite: x   Leuk Esterase: x / RBC: x / WBC x   Sq Epi: x / Non Sq Epi: x / Bacteria: x

## 2023-11-12 NOTE — PROGRESS NOTE ADULT - ASSESSMENT
78 M w/ Crohn's, AFib/Flutter s/p DCCVs on amiodarone, remote ileocectomy and open appendectomy. Admitted (6/23) for SBO vs Crohns flare, s/p NGT decompression and s/p lap TRE converted to open TRE, SBR x 3, left in discontinuity with abthera vac on (6/27), RTOR for ileocolic resection, small bowel anastomosis, and abdominal wall closure on (6/28), c/b fluid collection s/p IR aspiration of perihepatic fluid on (7/3), c/b wound dehiscence s/p RTOR exlap, washout, ileocolic resection with end ileostomy, blow hole colostomy, red rubber from ileostomy to small bowel anastomosis; vicryl bridging mesh on (7/5) transferred to SICU postoperatively for hemodynamic monitoring, with hospital course complicated by periods of AMS most recently on 9/1 and associated with oliguria, severe ELVI and hyponatremia, which resolved but stepped back up for to SICU on 9/10 for acute AMS, intermittent hypoglycemia, AFib with RVR. Percutaneous Cholecystostomy placed on 9/11 for enlarged GB, PCT capped 10/5. 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 Left Clay drain removed 11/7. Tolerating regular diet.     Plan     - Continue SICU care   - Team 5 will follow

## 2023-11-12 NOTE — PROGRESS NOTE ADULT - SUBJECTIVE AND OBJECTIVE BOX
(Covering for Dr. Peterson)  Uneventful night.  Tolerating some p/o diet  OOB a with help.  On TPN  Wound managers in place    Vital Signs Last 24 Hrs  T(C): 37.1 (12 Nov 2023 01:04), Max: 37.1 (12 Nov 2023 01:04)  T(F): 98.8 (12 Nov 2023 01:04), Max: 98.8 (12 Nov 2023 01:04)  HR: 94 (12 Nov 2023 11:32) (86 - 96)  BP: 138/70 (12 Nov 2023 11:32) (138/64 - 156/73)  BP(mean): 98 (12 Nov 2023 11:32) (92 - 103)  RR: 18 (12 Nov 2023 11:32) (16 - 24)  SpO2: 95% (12 Nov 2023 11:07) (95% - 100%)    Parameters below as of 12 Nov 2023 11:07  Patient On (Oxygen Delivery Method): room air    Abd: wound manager in place - no leakage.  RUQ cony drain in place . Non tender and not distended  Ext: without calf tenderness     UO 475ml/ shift; 1 650 ml/24 hr  Central fistula 425 ml/ shift; 915ml/24 hrs  Cony RUQ drain 325 ml/ shift; 600ml/24 hrs    Labs             8.3    7.15  )-----------( 173      ( 12 Nov 2023 05:26 )             26.4   11-11    136  |  101  |  29<H>  ----------------------------<  127<H>  4.3   |  28  |  0.90    Ca    8.9      11 Nov 2023 09:24  Phos  3.7     11-11  Mg     1.9     11-11

## 2023-11-12 NOTE — PROGRESS NOTE ADULT - ASSESSMENT
A.? no changes today.  TPN and po diet tolerated   Remains in [facility] [place].   RN team and Md taking great care of patient  Wound manager as per WOCN team  Awaiting a medicine tgat is supposed to increase absorption.

## 2023-11-12 NOTE — PROGRESS NOTE ADULT - SUBJECTIVE AND OBJECTIVE BOX
SUBJECTIVE:  This morning, he feels well; no pain endorsed, out of bed and ambulating in [place]. Adequate urine output. No nausea or vomiting.. No acute complaints. Tolerating PO diet, TPN running as well.   C/f bleed in eakins pouch, appeared purplish red and endorsed eating blueberrys last night. Sent occult blood test to r/o. +positive for blood.    MEDICATIONS  (STANDING):  albumin human 25% IVPB 50 milliLiter(s) IV Intermittent every 8 hours  chlorhexidine 0.12% Liquid 15 milliLiter(s) Swish and Spit two times a day  chlorhexidine 2% Cloths 1 Application(s) Topical <User Schedule>  cholestyramine Powder (Sugar-Free) 4 Gram(s) Oral daily  diphenoxylate/atropine 2 Tablet(s) Oral every 6 hours  enoxaparin Injectable 100 milliGRAM(s) SubCutaneous every 12 hours  epoetin matt (EPOGEN) Injectable 25073 Unit(s) SubCutaneous every 7 days  glucagon  Injectable 1 milliGRAM(s) IntraMuscular once  influenza  Vaccine (HIGH DOSE) 0.7 milliLiter(s) IntraMuscular once  iron sucrose IVPB 200 milliGRAM(s) IV Intermittent every 24 hours  levothyroxine Injectable 40 MICROGram(s) IV Push at bedtime  loperamide 4 milliGRAM(s) Oral every 6 hours  metoprolol tartrate Injectable 5 milliGRAM(s) IV Push every 6 hours  Omegaven 10G/100ml 80 Gram(s) 80 Gram(s) (66.67 mL/Hr) IV Continuous <Continuous>  Omegaven 10G/100ML 80 Gram(s) 80 Gram(s) (66.67 mL/Hr) IV Continuous <Continuous>  opium Tincture 6 milliGRAM(s) Oral every 6 hours  Parenteral Nutrition - Adult 1 Each TPN Continuous <Continuous>  Parenteral Nutrition - Adult 1 Each TPN Continuous <Continuous>  ursodiol Capsule 300 milliGRAM(s) Oral every 8 hours  venlafaxine XR. 150 milliGRAM(s) Oral two times a day    MEDICATIONS  (PRN):  LORazepam   Injectable 0.5 milliGRAM(s) IV Push at bedtime PRN Anxiety  ondansetron Injectable 4 milliGRAM(s) IV Push every 8 hours PRN Nausea and/or Vomiting    Vital Signs Last 24 Hrs  T(C): 37.1 (12 Nov 2023 01:04), Max: 37.1 (12 Nov 2023 01:04)  T(F): 98.8 (12 Nov 2023 01:04), Max: 98.8 (12 Nov 2023 01:04)  HR: 94 (12 Nov 2023 08:46) (86 - 98)  BP: 141/72 (12 Nov 2023 08:46) (138/64 - 170/77)  BP(mean): 100 (12 Nov 2023 08:46) (92 - 111)  RR: 17 (12 Nov 2023 08:46) (16 - 29)  SpO2: 98% (12 Nov 2023 08:46) (94% - 100%)    Parameters below as of 12 Nov 2023 08:46  Patient On (Oxygen Delivery Method): room air    Physical Exam:  General: NAD  HEENT: NC/AT, EOMI, PERRLA, normal hearing, no oral lesions, neck supple w/o LAD  Pulmonary: Nonlabored breathing, no respiratory distress, CTA-B  Cardiovascular: NSR, no murmurs  Abdominal: soft, nontender, nondistended. Midline wound bed well-healing with overlying wound manager, no signs of purulence or erythema. R sided JOYCE drain with scant bilious output. EC fistula draining serous dark brown liquid.  Extremities: WWP, 5/5 strength x 4, no clubbing/cyanosis, 1+ pedal edema in b/l LE.  Neuro: A/O x3, CNs II-XII grossly intact, normal motor/sensation, no focal deficits  Pulses: palpable distal pulses    I&O's Summary    11 Nov 2023 07:01  -  12 Nov 2023 07:00  --------------------------------------------------------  IN: 3799.2 mL / OUT: 3700 mL / NET: 99.2 mL    12 Nov 2023 07:01  -  12 Nov 2023 10:17  --------------------------------------------------------  IN: 135 mL / OUT: 0 mL / NET: 135 mL      LABS:                        8.3    7.15  )-----------( 173      ( 12 Nov 2023 05:26 )             26.4     11-11    136  |  101  |  29<H>  ----------------------------<  127<H>  4.3   |  28  |  0.90    Ca    8.9      11 Nov 2023 09:24  Phos  3.7     11-11  Mg     1.9     11-11      PT/INR - ( 11 Nov 2023 08:01 )   PT: 14.0 sec;   INR: 1.24          PTT - ( 11 Nov 2023 08:01 )  PTT:43.5 sec  Urinalysis Basic - ( 11 Nov 2023 09:24 )    Color: x / Appearance: x / SG: x / pH: x  Gluc: 127 mg/dL / Ketone: x  / Bili: x / Urobili: x   Blood: x / Protein: x / Nitrite: x   Leuk Esterase: x / RBC: x / WBC x   Sq Epi: x / Non Sq Epi: x / Bacteria: x      CAPILLARY BLOOD GLUCOSE      RADIOLOGY & ADDITIONAL STUDIES:

## 2023-11-12 NOTE — PROGRESS NOTE ADULT - SUBJECTIVE AND OBJECTIVE BOX
78M with history of Crohn's, AFib/Flutter s/p DCCVs on amiodarone, remote ileocectomy and open appendectomy. Admitted (6/23) for SBO vs Crohns flare, s/p NGT decompression and s/p lap TRE converted to open TRE, SBR x 3, left in discontinuity with abthera vac on (6/27), RTOR for ileocolic resection, small bowel anastomosis, and abdominal wall closure on (6/28), c/b fluid collection s/p IR aspiration of perihepatic fluid on (7/3), c/b wound dehiscence s/p RTOR exlap, washout, ileocolic resection with end ileostomy, blow hole colostomy, red rubber from ileostomy to small bowel anastomosis; vicryl bridging mesh on (7/5) transferred to SICU postoperatively for hemodynamic monitoring, with hospital course complicated by periods of AMS most recently on 9/1 and associated with oliguria, severe ELVI and hyponatremia, which resolved but stepped back up for to SICU on 9/10 for acute AMS, intermittent hypoglycemia, AFib with RVR. Percutaneous Cholecystostomy placed on 9/11 for enlarged GB, PCT capped 10/5, and connected to drainage bag 10/25.    Percutaneous cholecystostomy tube with 700 cc output past 24 hr, previously 880 cc/24h. Flushed easily with 3 cc NS. Dressings CDI.    -Strict I/O, monitor output  -IR will continue to follow   78M with history of Crohn's, AFib/Flutter s/p DCCVs on amiodarone, remote ileocectomy and open appendectomy. Admitted (6/23) for SBO vs Crohns flare, s/p NGT decompression and s/p lap TRE converted to open TRE, SBR x 3, left in discontinuity with abthera vac on (6/27), RTOR for ileocolic resection, small bowel anastomosis, and abdominal wall closure on (6/28), c/b fluid collection s/p IR aspiration of perihepatic fluid on (7/3), c/b wound dehiscence s/p RTOR exlap, washout, ileocolic resection with end ileostomy, blow hole colostomy, red rubber from ileostomy to small bowel anastomosis; vicryl bridging mesh on (7/5) transferred to SICU postoperatively for hemodynamic monitoring, with hospital course complicated by periods of AMS most recently on 9/1 and associated with oliguria, severe ELVI and hyponatremia, which resolved but stepped back up for to SICU on 9/10 for acute AMS, intermittent hypoglycemia, AFib with RVR. Percutaneous Cholecystostomy placed on 9/11 for enlarged GB, PCT capped 10/5, and connected to drainage bag 10/25.    Percutaneous cholecystostomy tube with 700 cc output past 24 hr, previously 700 cc/24h. Flushed easily with 3 cc NS. Dressings CDI.    -Strict I/O, monitor output  -IR will continue to follow

## 2023-11-13 NOTE — CHART NOTE - NSCHARTNOTEFT_GEN_A_CORE
Admitting Diagnosis:   Patient is a 78y old  Male who presents with a chief complaint of SBO (2023 10:33)      PAST MEDICAL & SURGICAL HISTORY:  Essential hypertension  Hypertension      Atrial fibrillation  s/p cardioversion  and   Pt. reports 4 DCCV  Now on Amiodarone 200mg PO bid and Eliquis 5mg PO bid  Last DCCV 4 yrs ago at [facility]      Crohn's disease  s/p partial resection of ileum      Hyperlipidemia      Hypothyroidism      History of depression  On Venlafaxine ER 150mg PO bid      Junctional rhythm      H/O knee surgery      History of cataract surgery          Current Nutrition Order:   TPN via PICC- 2.3L bag running over 18hrs (70ml/hr), providing 411g Dex, 144g AA, 80g Omegaven lipids; provides 2773.4 kcals, 144g protein, GIR 3.03 mg/kg/min  PO- Regular PO diet + 1 LPS BID [provides 200 kcals, 30g protein] + 1 Ensure Max daily [150 kcals, 30g protein]    PO Intake: Good (%) [ X  ]  Fair (50-75%) [   ] Poor (<25%) [   ]    GI Issues: ileostomy output 70cc x 24hrs, abdominal wound/fistula output 1835cc x 24hrs, per cony output 573cc x 24hrs    Pain: no pain/discomfort noted    Skin Integrity: abd wound [10.5 x 6.5cm] and fistula with wound manager in place, RLQ ileostomy, perc cony drain, skin tear below ostomy. wound to R heel resolved. Rudy score 20      Labs:       139  |  104  |  26<H>  ----------------------------<  83  4.1   |  28  |  0.95    Ca    8.9      2023 05:30  Phos  3.6     11-  Mg     2.0         TPro  8.2  /  Alb  3.2<L>  /  TBili  4.9<H>  /  DBili  3.0<H>  /  AST  76<H>  /  ALT  59<H>  /  AlkPhos  130<H>      CAPILLARY BLOOD GLUCOSE          Medications:  MEDICATIONS  (STANDING):  chlorhexidine 0.12% Liquid 15 milliLiter(s) Swish and Spit two times a day  chlorhexidine 2% Cloths 1 Application(s) Topical <User Schedule>  cholestyramine Powder (Sugar-Free) 4 Gram(s) Oral daily  diphenoxylate/atropine 2 Tablet(s) Oral every 6 hours  enoxaparin Injectable 100 milliGRAM(s) SubCutaneous every 12 hours  epoetin matt (EPOGEN) Injectable 62266 Unit(s) SubCutaneous every 7 days  glucagon  Injectable 1 milliGRAM(s) IntraMuscular once  influenza  Vaccine (HIGH DOSE) 0.7 milliLiter(s) IntraMuscular once  iron sucrose IVPB 200 milliGRAM(s) IV Intermittent once  levothyroxine Injectable 40 MICROGram(s) IV Push at bedtime  loperamide 4 milliGRAM(s) Oral every 6 hours  metoprolol tartrate Injectable 5 milliGRAM(s) IV Push every 6 hours  Omegaven 10G/100ML 80 Gram(s),Omegaven 10G/100ML 80 Gram(s),Omegaven 10G/100ML 80 Gram(s) 80 Gram(s) (66.67 mL/Hr) IV Continuous <Continuous>  opium Tincture 6 milliGRAM(s) Oral every 6 hours  Parenteral Nutrition - Adult 1 Each TPN Continuous <Continuous>  Parenteral Nutrition - Adult 1 Each TPN Continuous <Continuous>  ursodiol Capsule 300 milliGRAM(s) Oral every 8 hours  venlafaxine XR. 150 milliGRAM(s) Oral two times a day    MEDICATIONS  (PRN):  LORazepam   Injectable 0.5 milliGRAM(s) IV Push at bedtime PRN Anxiety  ondansetron Injectable 4 milliGRAM(s) IV Push every 8 hours PRN Nausea and/or Vomiting      Daily Weight in k.2 (2023 00:08)  Daily 95.8kg [2023]  Daily 94.2kg [2023]  Daily 85.2kg [2023]  Daily 85.2kg [31 Oct 2023]  Daily 85.2kg [24 Oct 2023]  Daily 85.2kg [20 Oct 2023]  Daily 81.9kg [18 Oct 2023]  Daily 85.2kg [16 Oct 2023]  Daily   95.2kg [12 Oct 2023]   Daily 87.7kg [11 Oct 2023]  Daily 87.4kg [09 Oct 2023]  Daily 86.4kg [07 Oct 2023]  Daily 82.5kg [06 Oct 2023]  Daily 82.6kg [4 Oct 2023]   Daily 83.5kg [03 Oct 2023]  Daily 84.5kg [2 Oct 2023]  Daily 87.2kg [1 Oct 2023]  Daily 88.3kg [29 Sep 2023]  Daily 89.9kg [28 Sep 2023]  Daily 87.7kg [24 Sep 2023]  Daily 90.4kg [21 Sep 2023]  Daily 91.4kg [20 Sep 2023]  Daily 86.7kg [18 Sep 2023]  Daily 88.1kg [16 Sep 2023]  Daily 88.9kg [15 Sep 2023]   Daily 89.1 [14 Sep 2023]  Daily 92.9kg [6 Sep 2023]  Daily 91.8kg [27 Aug 2023]  Daily 101kg [9 Aug 2023]  Daily   102.1kg [10 July 2023]  Daily 99.9kg [2023]      Weight Change: : weight trending down throughout admission, now appears to be trending back up. Recommend continue weekly/daily weights for trending & ensuring adequacy of nutrition provision    IBW: 86.4kg   % IBW: 109.0%    Estimated energy needs:   Ideal body weight (86.4kg) used for calculations as pt >100% IBW and BMI <30 per [facility] Standards of Care. Needs estimated for age and adjusted for current clinical status, increased needs for post-op & open abd wound healing    Calories: 3680-7679 kcals based on 30-40 kcals/kg  Protein: 129.6-172.8 g protein based on 1.5-2.0g protein/kg + additional depending on outputs  *Fluid needs per tea    Subjective:   78 M w/ Crohn's, AFib/Flutter s/p DCCVs on amiodarone, remote ileocectomy and open appendectomy. Admitted () for SBO vs Crohns flare, s/p NGT decompression and s/p lap TRE converted to open TRE, SBR x 3, left in discontinuity with abthera vac on (), RTOR for ileocolic resection, small bowel anastomosis, and abdominal wall closure on (), c/b fluid collection s/p IR aspiration of perihepatic fluid on (7/3), c/b wound dehiscence s/p RTOR exlap, washout, ileocolic resection with end ileostomy, blow hole colostomy, red rubber from ileostomy to small bowel anastomosis; vicryl bridging mesh on () transferred to SICU postoperatively for hemodynamic monitoring, with hospital course complicated by periods of AMS most recently on  and associated with oliguria, severe ELVI and hyponatremia, which resolved but stepped back up for to SICU on 9/10 for acute AMS, intermittent hypoglycemia, AFib with RVR. Percutaneous Cholecystostomy placed on  for enlarged GB, PCT capped 10/5.    Chart reviewed, labs & Rx reviewed. Pt seen today with IDT during AM rounds, on room air. Remains on TPN with additional PO offerings. Currently receiving regular PO diet with additional high protein supplements. Pt appears to be tolerating diet, noted increase in fistula output. Pt remains on Omegaven [fish oil baased ILE] with aim to reduce cholestasis. LFTs overall appear to be improving [slight uptick in T. bili today]. To continue cycling TPN over 18hrs, on ursodiol & cholestyramine. Labs reviewed- BUN 26 <H>, Creat WNL. Total bilirubin 4.9 <H> [<-- 3.9], direct bilirubin 3.0 <H> [<-- 2.6], alk phos 130 <H> [<-- 149], AST 76 <H> [<-- 92], ALT 59 <H> [<-- 70],  []. On - vitamin C 0.4 [borderline low], selenium 102 [now WNL]. Meds: on IV iron, opium, epogen, ursodiol, cholestyramine, synthroid [administer 30-60 minutes before food; separate at least 4hrs from calcium or iron-containing products or bile acid sequestrants], imodium, zofran. RDN will continue to monitor, reassess, and intervene as appropriate.     Previous Nutrition Diagnosis: Increased Nutrient Needs R/T physiological demands for nutrient AEB post-op wound healing    Active [ X  ]  Resolved [   ]    If resolved, new PES:     Goal:  Pt will meet at least 75% of protein & energy needs via most appropriate route for nutrition     Recommendations:  1. c/w TPN via PICC over 18hrs- 411g Dex, 144g AA, 80g Omegaven lipids; provides 2773.4 kcals, 144g protein, GIR 3.03 mg/kg/min  - provides 32.1 kcals/kg, 25.4 non-protein kcals/kg, 1.67g protein/kg IBW   - consider cycling TPN over 12-16hrs  - monitor LFTs closely, consider decreasing lipids to 50g/day    - MVI, selenium, zinc in TPN bag  - consider increasing vit C in bag  - provide copper in TPN 3x/week [MWF]-- to recheck levels today  - monitor weights & wound healing, adjust substrates as indicated  2. PO diet per team discretion  - current: Regular PO diet + 1 LPS BID [200 kcals, 30g protein] + 1 Ensure Max daily [150 kcals, 30g protein]  - emphasis on small, high protein meals  - hold PO diet as indicated  3. Fluids & lytes per team  - additional bolus prn  4. Weekly lipid panel   - hold/decrease lipids if TG >400  5. Monitor BMP/Mg/Phos, POC BG  - monitor & replenish lytes outside TPN bag prn  6. Continue close monitoring of clinical course & adjust recommendations prn    Education: ongoing diet education regarding current nutrition provision & recommended foods    Risk Level: High [  X ] Moderate [   ] Low [   ]

## 2023-11-13 NOTE — PROGRESS NOTE ADULT - ASSESSMENT
78 M w/ Crohn's, AFib/Flutter s/p DCCVs on amiodarone, remote ileocectomy and open appendectomy. Admitted (6/23) for SBO vs Crohns flare, s/p NGT decompression and s/p lap TRE converted to open TRE, SBR x 3, left in discontinuity with abthera vac on (6/27), RTOR for ileocolic resection, small bowel anastomosis, and abdominal wall closure on (6/28), c/b fluid collection s/p IR aspiration of perihepatic fluid on (7/3), c/b wound dehiscence s/p RTOR exlap, washout, ileocolic resection with end ileostomy, blow hole colostomy, red rubber from ileostomy to small bowel anastomosis; vicryl bridging mesh on (7/5) transferred to SICU postoperatively for hemodynamic monitoring, with hospital course complicated by periods of AMS most recently on 9/1 and associated with oliguria, severe ELVI and hyponatremia, which resolved but stepped back up for to SICU on 9/10 for acute AMS, intermittent hypoglycemia, AFib with RVR. Percutaneous Cholecystostomy placed on 9/11 for enlarged GB, PCT capped 10/5. 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 Left Clay drain removed 11/7. Tolerating regular diet. Wound manager with 360 output overnight and 368 perchole tube, received albumin over the weekend    Plan   - Continue diet and TPN as tolerated   - Continue wound care per wound care RN   - Appreciate SICU care   - Team 4c following     D/w chief resident and attending

## 2023-11-13 NOTE — PROGRESS NOTE ADULT - SUBJECTIVE AND OBJECTIVE BOX
SUBJECTIVE:   Patient seen at bedside this AM; resting comfortably   No acute overnight or over the weekend events   No complaints or concerns   Tolerating diet and TPN   Voiding spontaneously       MEDICATIONS  (STANDING):  chlorhexidine 0.12% Liquid 15 milliLiter(s) Swish and Spit two times a day  chlorhexidine 2% Cloths 1 Application(s) Topical <User Schedule>  cholestyramine Powder (Sugar-Free) 4 Gram(s) Oral daily  diphenoxylate/atropine 2 Tablet(s) Oral every 6 hours  enoxaparin Injectable 100 milliGRAM(s) SubCutaneous every 12 hours  epoetin matt (EPOGEN) Injectable 48328 Unit(s) SubCutaneous every 7 days  glucagon  Injectable 1 milliGRAM(s) IntraMuscular once  influenza  Vaccine (HIGH DOSE) 0.7 milliLiter(s) IntraMuscular once  levothyroxine Injectable 40 MICROGram(s) IV Push at bedtime  loperamide 4 milliGRAM(s) Oral every 6 hours  metoprolol tartrate Injectable 5 milliGRAM(s) IV Push every 6 hours  Omegaven 10G/100ML 80 Gram(s),Omegaven 10G/100ML 80 Gram(s),Omegaven 10G/100ML 80 Gram(s) 80 Gram(s) (66.67 mL/Hr) IV Continuous <Continuous>  opium Tincture 6 milliGRAM(s) Oral every 6 hours  Parenteral Nutrition - Adult 1 Each TPN Continuous <Continuous>  Parenteral Nutrition - Adult 1 Each TPN Continuous <Continuous>  ursodiol Capsule 300 milliGRAM(s) Oral every 8 hours  venlafaxine XR. 150 milliGRAM(s) Oral two times a day    MEDICATIONS  (PRN):  LORazepam   Injectable 0.5 milliGRAM(s) IV Push at bedtime PRN Anxiety  ondansetron Injectable 4 milliGRAM(s) IV Push every 8 hours PRN Nausea and/or Vomiting      Vital Signs Last 24 Hrs  T(C): 36.7 (13 Nov 2023 11:10), Max: 37 (13 Nov 2023 05:07)  T(F): 98.1 (13 Nov 2023 11:10), Max: 98.6 (13 Nov 2023 05:07)  HR: 96 (13 Nov 2023 15:40) (83 - 97)  BP: 162/80 (13 Nov 2023 15:40) (134/76 - 179/81)  BP(mean): 111 (13 Nov 2023 15:40) (97 - 116)  RR: 18 (13 Nov 2023 15:40) (18 - 21)  SpO2: 93% (13 Nov 2023 15:40) (92% - 98%)    Parameters below as of 13 Nov 2023 15:40  Patient On (Oxygen Delivery Method): room air        Physical Exam:  General: NAD, resting comfortably in bed  Pulmonary: Nonlabored breathing, no respiratory distress  Cardiovascular: NSR  Abdominal: soft, NT/ND, wound manager in place (1800 cc enteric content), ostomy in place (70 cc), perc cony bilious (573 cc)   : Adequate UOP (1900 cc)   Extremities: WWP, normal strength  Neuro: A/O x 3, CNs II-XII grossly intact, no focal deficits      I&O's Summary    12 Nov 2023 07:01  -  13 Nov 2023 07:00  --------------------------------------------------------  IN: 5100.4 mL / OUT: 4378 mL / NET: 722.4 mL    13 Nov 2023 07:01  -  13 Nov 2023 16:25  --------------------------------------------------------  IN: 640 mL / OUT: 1600 mL / NET: -960 mL        LABS:                        8.1    7.65  )-----------( 168      ( 13 Nov 2023 05:30 )             26.6     11-13    139  |  104  |  26<H>  ----------------------------<  83  4.1   |  28  |  0.95    Ca    8.9      13 Nov 2023 05:30  Phos  3.6     11-13  Mg     2.0     11-13    TPro  8.2  /  Alb  3.2<L>  /  TBili  4.9<H>  /  DBili  3.0<H>  /  AST  76<H>  /  ALT  59<H>  /  AlkPhos  130<H>  11-13      Urinalysis Basic - ( 13 Nov 2023 05:30 )    Color: x / Appearance: x / SG: x / pH: x  Gluc: 83 mg/dL / Ketone: x  / Bili: x / Urobili: x   Blood: x / Protein: x / Nitrite: x   Leuk Esterase: x / RBC: x / WBC x   Sq Epi: x / Non Sq Epi: x / Bacteria: x      CAPILLARY BLOOD GLUCOSE        LIVER FUNCTIONS - ( 13 Nov 2023 05:30 )  Alb: 3.2 g/dL / Pro: 8.2 g/dL / ALK PHOS: 130 U/L / ALT: 59 U/L / AST: 76 U/L / GGT: x             RADIOLOGY & ADDITIONAL STUDIES:

## 2023-11-13 NOTE — PROGRESS NOTE ADULT - NS ATTEND AMEND GEN_ALL_CORE FT
Crohn's, AF, s/p ileocolic resection, sbr, end ileostomy, RUE DVT, enteroatmospheric fistula  physical as above  continue TPN  bilirubin slightly higher today with some increase in indirect  other LFT lower, continue to follow  feels slightly breathless; no change in CXR today  check another echo and will consider chest CT  he feels could also be anxiety  continue epo/Fe  LE edema much improved, can DC albumin

## 2023-11-13 NOTE — PROGRESS NOTE ADULT - SUBJECTIVE AND OBJECTIVE BOX
ON: No events overnight. Over the weekend pouch output noted to changed when having blueberry yogurt, FOBT sent and positive, however Hb stable and output returned to usual color.       SUBJECTIVE: No new complaints.     MEDICATIONS  (STANDING):  chlorhexidine 0.12% Liquid 15 milliLiter(s) Swish and Spit two times a day  chlorhexidine 2% Cloths 1 Application(s) Topical <User Schedule>  cholestyramine Powder (Sugar-Free) 4 Gram(s) Oral daily  diphenoxylate/atropine 2 Tablet(s) Oral every 6 hours  enoxaparin Injectable 100 milliGRAM(s) SubCutaneous every 12 hours  epoetin matt (EPOGEN) Injectable 71597 Unit(s) SubCutaneous every 7 days  glucagon  Injectable 1 milliGRAM(s) IntraMuscular once  influenza  Vaccine (HIGH DOSE) 0.7 milliLiter(s) IntraMuscular once  levothyroxine Injectable 40 MICROGram(s) IV Push at bedtime  loperamide 4 milliGRAM(s) Oral every 6 hours  metoprolol tartrate Injectable 5 milliGRAM(s) IV Push every 6 hours  Omegaven 10G/100ML 80 Gram(s) 80 Gram(s) (66.67 mL/Hr) IV Continuous <Continuous>  Omegaven 10G/100ML 80 Gram(s) 80 Gram(s) 80 Gram(s) (66.67 mL/Hr) IV Continuous <Continuous>  opium Tincture 6 milliGRAM(s) Oral every 6 hours  Parenteral Nutrition - Adult 1 Each TPN Continuous <Continuous>  ursodiol Capsule 300 milliGRAM(s) Oral every 8 hours  venlafaxine XR. 150 milliGRAM(s) Oral two times a day    MEDICATIONS  (PRN):  LORazepam   Injectable 0.5 milliGRAM(s) IV Push at bedtime PRN Anxiety  ondansetron Injectable 4 milliGRAM(s) IV Push every 8 hours PRN Nausea and/or Vomiting      Drips:     ICU Vital Signs Last 24 Hrs  T(C): 37 (13 Nov 2023 05:07), Max: 37 (12 Nov 2023 12:00)  T(F): 98.6 (13 Nov 2023 05:07), Max: 98.6 (12 Nov 2023 12:00)  HR: 97 (13 Nov 2023 06:13) (83 - 97)  BP: 147/68 (13 Nov 2023 06:13) (134/76 - 166/76)  BP(mean): 98 (13 Nov 2023 06:13) (96 - 110)  ABP: --  ABP(mean): --  RR: 20 (13 Nov 2023 06:13) (16 - 21)  SpO2: 92% (13 Nov 2023 06:13) (92% - 98%)    O2 Parameters below as of 13 Nov 2023 06:13  Patient On (Oxygen Delivery Method): room air          Physical Exam:  General: NAD, resting comfortably in bed  Pulmonary: Nonlabored breathing, no respiratory distress  Cardiovascular: NSR  Abdominal: soft, NT/ND, wound manager in place, ostomy in place, perc cony bilious  Extremities: WWP, normal strength  Neuro: A/O x 3, CNs II-XII grossly intact, no focal deficits        I&O's Summary    11 Nov 2023 07:01  -  12 Nov 2023 07:00  --------------------------------------------------------  IN: 3799.2 mL / OUT: 3700 mL / NET: 99.2 mL    12 Nov 2023 07:01  -  13 Nov 2023 06:41  --------------------------------------------------------  IN: 5100.4 mL / OUT: 4378 mL / NET: 722.4 mL        LABS:                        8.3    7.15  )-----------( 173      ( 12 Nov 2023 05:26 )             26.4     11-11    136  |  101  |  29<H>  ----------------------------<  127<H>  4.3   |  28  |  0.90    Ca    8.9      11 Nov 2023 09:24  Phos  3.7     11-11  Mg     1.9     11-11      PT/INR - ( 11 Nov 2023 08:01 )   PT: 14.0 sec;   INR: 1.24          PTT - ( 11 Nov 2023 08:01 )  PTT:43.5 sec  Urinalysis Basic - ( 11 Nov 2023 09:24 )    Color: x / Appearance: x / SG: x / pH: x  Gluc: 127 mg/dL / Ketone: x  / Bili: x / Urobili: x   Blood: x / Protein: x / Nitrite: x   Leuk Esterase: x / RBC: x / WBC x   Sq Epi: x / Non Sq Epi: x / Bacteria: x

## 2023-11-13 NOTE — PROGRESS NOTE ADULT - SUBJECTIVE AND OBJECTIVE BOX
ON: AMRITA      SUBJECTIVE: Pt seen and examined at bedside this am by ICU team. Patient is sitting in chair comfortably but feels short of breath at times. Tolerating diet, denies pain. Denies fever, nausea, vomiting, chest pain, and shortness of breath.     MEDICATIONS  (STANDING):  chlorhexidine 0.12% Liquid 15 milliLiter(s) Swish and Spit two times a day  chlorhexidine 2% Cloths 1 Application(s) Topical <User Schedule>  cholestyramine Powder (Sugar-Free) 4 Gram(s) Oral daily  diphenoxylate/atropine 2 Tablet(s) Oral every 6 hours  enoxaparin Injectable 100 milliGRAM(s) SubCutaneous every 12 hours  epoetin matt (EPOGEN) Injectable 58729 Unit(s) SubCutaneous every 7 days  glucagon  Injectable 1 milliGRAM(s) IntraMuscular once  influenza  Vaccine (HIGH DOSE) 0.7 milliLiter(s) IntraMuscular once  iron sucrose IVPB 200 milliGRAM(s) IV Intermittent once  levothyroxine Injectable 40 MICROGram(s) IV Push at bedtime  loperamide 4 milliGRAM(s) Oral every 6 hours  metoprolol tartrate Injectable 5 milliGRAM(s) IV Push every 6 hours  Omegaven 10G/100ML 80 Gram(s),Omegaven 10G/100ML 80 Gram(s),Omegaven 10G/100ML 80 Gram(s) 80 Gram(s) (66.67 mL/Hr) IV Continuous <Continuous>  opium Tincture 6 milliGRAM(s) Oral every 6 hours  Parenteral Nutrition - Adult 1 Each TPN Continuous <Continuous>  Parenteral Nutrition - Adult 1 Each TPN Continuous <Continuous>  ursodiol Capsule 300 milliGRAM(s) Oral every 8 hours  venlafaxine XR. 150 milliGRAM(s) Oral two times a day    MEDICATIONS  (PRN):  LORazepam   Injectable 0.5 milliGRAM(s) IV Push at bedtime PRN Anxiety  ondansetron Injectable 4 milliGRAM(s) IV Push every 8 hours PRN Nausea and/or Vomiting      Drips:     ICU Vital Signs Last 24 Hrs  T(C): 36.7 (13 Nov 2023 11:10), Max: 37 (13 Nov 2023 05:07)  T(F): 98.1 (13 Nov 2023 11:10), Max: 98.6 (13 Nov 2023 05:07)  HR: 95 (13 Nov 2023 11:00) (83 - 97)  BP: 149/70 (13 Nov 2023 11:00) (134/76 - 169/77)  BP(mean): 101 (13 Nov 2023 11:00) (97 - 110)  ABP: --  ABP(mean): --  RR: 19 (13 Nov 2023 11:00) (18 - 21)  SpO2: 92% (13 Nov 2023 11:00) (92% - 98%)    O2 Parameters below as of 13 Nov 2023 11:00  Patient On (Oxygen Delivery Method): room air      Physical Exam:  General: NAD  HEENT: NC/AT, EOMI, PERRLA, normal hearing, no oral lesions, neck supple w/o LAD  Pulmonary: Mild tachypnea, no respiratory distress, CTA-B  Cardiovascular: NSR, no murmurs  Abdominal: soft, NT/ND; perc cony with bilious output, ileostomy with minimal tan liquid output, midline ECF with tan output  Extremities: WWP, 5/5 strength x 4, no clubbing/cyanosis/edema  Neuro: A/O x3, CNs II-XII grossly intact, normal motor/sensation, no focal deficits  Pulses: palpable distal pulses    Lines/tubes/drains:  [place]:	      Vent settings:      I&O's Summary    12 Nov 2023 07:01  -  13 Nov 2023 07:00  --------------------------------------------------------  IN: 5100.4 mL / OUT: 4378 mL / NET: 722.4 mL    13 Nov 2023 07:01  -  13 Nov 2023 12:54  --------------------------------------------------------  IN: 540 mL / OUT: 450 mL / NET: 90 mL        LABS:                        8.1    7.65  )-----------( 168      ( 13 Nov 2023 05:30 )             26.6     11-13    139  |  104  |  26<H>  ----------------------------<  83  4.1   |  28  |  0.95    Ca    8.9      13 Nov 2023 05:30  Phos  3.6     11-13  Mg     2.0     11-13    TPro  8.2  /  Alb  3.2<L>  /  TBili  4.9<H>  /  DBili  3.0<H>  /  AST  76<H>  /  ALT  59<H>  /  AlkPhos  130<H>  11-13      Urinalysis Basic - ( 13 Nov 2023 05:30 )    Color: x / Appearance: x / SG: x / pH: x  Gluc: 83 mg/dL / Ketone: x  / Bili: x / Urobili: x   Blood: x / Protein: x / Nitrite: x   Leuk Esterase: x / RBC: x / WBC x   Sq Epi: x / Non Sq Epi: x / Bacteria: x      CAPILLARY BLOOD GLUCOSE        LIVER FUNCTIONS - ( 13 Nov 2023 05:30 )  Alb: 3.2 g/dL / Pro: 8.2 g/dL / ALK PHOS: 130 U/L / ALT: 59 U/L / AST: 76 U/L / GGT: x             Cultures:    RADIOLOGY & ADDITIONAL STUDIES:

## 2023-11-13 NOTE — PROGRESS NOTE ADULT - ASSESSMENT
78M w PMH Crohn's, AFib/Flutter s/p DCCVs on amiodarone, remote ileocectomy and open appendectomy. Admitted (6/23) for SBO vs Crohns flare, s/p NGT decompression and s/p lap converted to open TRE, SBR x 3, left in discontinuity with abthera vac on (6/27), RTOR for ileocolic resection, small bowel anastomosis, and abdominal wall closure on (6/28), c/b fluid collection s/p IR aspiration of perihepatic fluid on (7/3), c/b wound dehiscence s/p RTOR exlap, washout, ileocolic resection with end ileostomy, blow hole colostomy, red rubber from ileostomy to small bowel anastomosis; vicryl bridging mesh on (7/5) transferred to SICU postoperatively for hemodynamic monitoring, with hospital course complicated by periods of severe ELVI and hyponatremia, which resolved but stepped back up for to SICU on (9/10) for acute AMS, intermittent hypoglycemia, AFib with RVR. Percutaneous Cholecystostomy placed on (9/11) for enlarged GB, PCT capped 10/5 and uncapped 10/25 for hyperbilirubinemia--improving after changes to TPN made. Right brachial DVT, left basillic and cephalic superficial thrombus on duplex 11/2, on Lovenox 100 BID.    NEURO: Hospital course with AMS (resolved), EEG neg. Hx of depression - cont Effexor. Insomnia: Melatonin PRN. Nausea: Zofran PRN. Anxiety: Lorazepam PRN.   HEENT: Cepacol prn  CV: Hx Afib/flutter: s/p DCCV,  Amio held for Increased LFT'S. Continue Metoprolol 5q6. Hx HLD: Lipitor held given high LFTs. TTE  (7/18) - PASP 64mmHg, EF 65-70%,  Hx HTN -  holding Norvasc 10qd. Holding Losartan. Cont Lopressor 5 q6. s/p Albumin 25% q8 x3 days. Repeat echo pending  PULM: mild SOB, satting well on RA. CXR pending, POCUS pending  GI/FEN: High protein diet. Cont Ensure max x1/day. Cont TPN/ lipids - Continue Omegavan to reduce cholecystasis:  (2.3 L/day volume) for high ostomy and ECF output: Cont Octreotide in TPN, Imodium, Lomotil, Tincture of Opium, Cholestyramine 10/18. Transaminitis of unknown origin, with downtrending LFTs, stable Tbili. s/p Perc Choley on 9/11; MRCP with no obstruction, done as per Hepatology started on ursidiol, cont PPI. monitor drainage from fistula, bolus as needed to keep I&O even. GI following, considering Gattex. Overnight ECF output red/brown concerning for blood, fecal occult blood positive at the time. Pending AM CBC.  : Voids.   ENDO: Hx hypothyroid: IV Synthroid, TSH wnl on 10/23  ID: Bld Cx's NGTD. As per Hepatology will get Fungal Bld cx's.  Currently off abx. Cont Nystatin powder // PREVIOUSLY had C. tertium, Lactobacillis from his IR cx 7/3, and candida albicans, lactobacillus, vanc sensitive E faecium, vanc resistant E gallinarum, vanc resistant E casseliflavus, lactobacillus from his OR cx 7/5. Completed course of abx with Imipenem (9/10-9/15). Imipenem (8/26--) imipenem (6/30-7/12, 7/23-7/30), Dapto (6/30-7/5 and 7/23-7/24). Empiric dapto (8/23-24) and cefepime (8/23-24).   PPX: SCDs, Therapeutic lovenox 2/2 R brachial vein DVT (11/3 - )  HEME: continue Iron Sucrose 200 qd x 3 days for chronic anemia.  LINES: PIVs, RUE PICC: (11/1-) DC'd: LUE PICC (9/1-11/1 )  WOUNDS/DRAINS: Abdominal wound and fistula with wound manager in place dressing change T & F. RLQ Ostomy. IR perc cony. // DC: L Clay drain.  PT: Ambulate as tolerated   DISPO: SICU due to complicated hospital course. but will do vitals q4hrs (hold overnight), and CBC/BMP QD in setting of high fistula output.

## 2023-11-13 NOTE — PROGRESS NOTE ADULT - ASSESSMENT
78 M w/ Crohn's, AFib/Flutter s/p DCCVs on amiodarone, remote ileocectomy and open appendectomy. Admitted (6/23) for SBO vs Crohns flare, s/p NGT decompression and s/p lap TRE converted to open TRE, SBR x 3, left in discontinuity with abthera vac on (6/27), RTOR for ileocolic resection, small bowel anastomosis, and abdominal wall closure on (6/28), c/b fluid collection s/p IR aspiration of perihepatic fluid on (7/3), c/b wound dehiscence s/p RTOR exlap, washout, ileocolic resection with end ileostomy, blow hole colostomy, red rubber from ileostomy to small bowel anastomosis; vicryl bridging mesh on (7/5) transferred to SICU postoperatively for hemodynamic monitoring, with hospital course complicated by periods of AMS most recently on 9/1 and associated with oliguria, severe ELVI and hyponatremia, which resolved but stepped back up for to SICU on 9/10 for acute AMS, intermittent hypoglycemia, AFib with RVR. Percutaneous Cholecystostomy placed on 9/11 for enlarged GB, PCT capped 10/5. 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 Left Clay drain removed 11/7. Tolerating regular diet. Wound manager with 360 output overnight and 368 perchole tube, received albumin over the weekend    Plan     - Continue SICU care   - Team 5 will follow

## 2023-11-13 NOTE — PROGRESS NOTE ADULT - SUBJECTIVE AND OBJECTIVE BOX
78M with history of Crohn's, AFib/Flutter s/p DCCVs on amiodarone, remote ileocectomy and open appendectomy. Admitted (6/23) for SBO vs Crohns flare, s/p NGT decompression and s/p lap TRE converted to open TRE, SBR x 3, left in discontinuity with abthera vac on (6/27), RTOR for ileocolic resection, small bowel anastomosis, and abdominal wall closure on (6/28), c/b fluid collection s/p IR aspiration of perihepatic fluid on (7/3), c/b wound dehiscence s/p RTOR exlap, washout, ileocolic resection with end ileostomy, blow hole colostomy, red rubber from ileostomy to small bowel anastomosis; vicryl bridging mesh on (7/5) transferred to SICU postoperatively for hemodynamic monitoring, with hospital course complicated by periods of AMS most recently on 9/1 and associated with oliguria, severe ELVI and hyponatremia, which resolved but stepped back up for to SICU on 9/10 for acute AMS, intermittent hypoglycemia, AFib with RVR. Percutaneous Cholecystostomy placed on 9/11 for enlarged GB, PCT capped 10/5, and connected to drainage bag 10/25.    Percutaneous cholecystostomy tube with 573 cc output past 24 hr, previously 700 cc/24h. Flushed easily with 3 cc NS. Dressings CDI.    -Strict I/O, monitor output  -IR will continue to follow

## 2023-11-14 NOTE — PROGRESS NOTE ADULT - SUBJECTIVE AND OBJECTIVE BOX
S: No new issues/events overnight, no new med c/o    O: ICU Vital Signs Last 24 Hrs  T(F): 98.3 (11-14-23 @ 09:38), Max: 98.6 (11-14-23 @ 05:08)  HR: 94 (11-14-23 @ 10:30) (93 - 99)  BP: 142/70 (11-14-23 @ 10:30) (138/70 - 179/81)  BP(mean): 100 (11-14-23 @ 10:30) (97 - 116)  ABP: --  RR: 22 (11-14-23 @ 10:30) (18 - 31)  SpO2: 96% (11-14-23 @ 10:30) (92% - 98%)    PHYSICAL EXAM:   Neurological: AAOx3, CNII-XII intact,  strength 5/5 b/l  ENT: mucus membrane moist  Cardiovascular: RRR  Respiratory: CTA  Gastrointestinal: soft, NT, ND, BS+  Extremities: warm, no dependent edema  Vascular: no cyanosis/erythema  Skin: no rashes  MSK: no joint swelling.     LABS:    11-14    138  |  103  |  29<H>  ----------------------------<  105<H>  4.3   |  28  |  0.88    Ca    8.8      14 Nov 2023 08:56  Phos  3.2     11-14  Mg     2.0     11-14    TPro  8.1  /  Alb  3.0<L>  /  TBili  5.3<H>  /  DBili  3.6<H>  /  AST  83<H>  /  ALT  62<H>  /  AlkPhos  135<H>  11-14  LIVER FUNCTIONS - ( 14 Nov 2023 08:56 )  Alb: 3.0 g/dL / Pro: 8.1 g/dL / ALK PHOS: 135 U/L / ALT: 62 U/L / AST: 83 U/L / GGT: x                               8.1    7.65  )-----------( 168      ( 13 Nov 2023 05:30 )             26.6     Urinalysis Basic - ( 14 Nov 2023 08:56 )    Color: x / Appearance: x / SG: x / pH: x  Gluc: 105 mg/dL / Ketone: x  / Bili: x / Urobili: x   Blood: x / Protein: x / Nitrite: x   Leuk Esterase: x / RBC: x / WBC x   Sq Epi: x / Non Sq Epi: x / Bacteria: x    CAPILLARY BLOOD GLUCOSE        MEDICATIONS  (STANDING):  chlorhexidine 0.12% Liquid 15 milliLiter(s) Swish and Spit two times a day  chlorhexidine 2% Cloths 1 Application(s) Topical <User Schedule>  cholestyramine Powder (Sugar-Free) 4 Gram(s) Oral daily  diphenoxylate/atropine 2 Tablet(s) Oral every 6 hours  enoxaparin Injectable 100 milliGRAM(s) SubCutaneous every 12 hours  epoetin matt (EPOGEN) Injectable 81138 Unit(s) SubCutaneous every 7 days  glucagon  Injectable 1 milliGRAM(s) IntraMuscular once  influenza  Vaccine (HIGH DOSE) 0.7 milliLiter(s) IntraMuscular once  levothyroxine Injectable 40 MICROGram(s) IV Push at bedtime  loperamide 4 milliGRAM(s) Oral every 6 hours  metoprolol tartrate Injectable 5 milliGRAM(s) IV Push every 6 hours  Omegaven 10G/100ML 80 Gram(s) 80 Gram(s) (66.67 mL/Hr) IV Continuous <Continuous>  opium Tincture 6 milliGRAM(s) Oral every 6 hours  Parenteral Nutrition - Adult 1 Each TPN Continuous <Continuous>  Parenteral Nutrition - Adult 1 Each TPN Continuous <Continuous>  ursodiol Capsule 300 milliGRAM(s) Oral every 8 hours  venlafaxine XR. 150 milliGRAM(s) Oral two times a day    MEDICATIONS  (PRN):  LORazepam   Injectable 0.5 milliGRAM(s) IV Push at bedtime PRN Anxiety  ondansetron Injectable 4 milliGRAM(s) IV Push every 8 hours PRN Nausea and/or Vomiting      Poole:	  [ ] None	[ ] Daily Poole Order Placed	   Indication:	  [ ] Strict I and O's    [ ] Obstruction     [ ] Incontinence + Stage 3 or 4 Decubitus  Central Line:  [ ] None	   [ ]  Medication / TPN Administration     [ ] No Peripheral IV        S: No new issues/events overnight, no new med c/o    O: ICU Vital Signs Last 24 Hrs  T(F): 98.3 (11-14-23 @ 09:38), Max: 98.6 (11-14-23 @ 05:08)  HR: 94 (11-14-23 @ 10:30) (93 - 99)  BP: 142/70 (11-14-23 @ 10:30) (138/70 - 179/81)  BP(mean): 100 (11-14-23 @ 10:30) (97 - 116)  ABP: --  RR: 22 (11-14-23 @ 10:30) (18 - 31)  SpO2: 96% (11-14-23 @ 10:30) (92% - 98%)    PHYSICAL EXAM:   Neurological: AAOx3, CNII-XII intact,  strength 5/5 b/l  ENT: mucus membrane moist  Cardiovascular: RRR  Respiratory: CTA  Gastrointestinal: soft, NT, ND, wound manager with grayish output, right sided ostomy bag with scant yellowish drainage.   Extremities: warm, no dependent edema  Vascular: no cyanosis/erythema  Skin: no rashes  MSK: no joint swelling.     LABS:    11-14    138  |  103  |  29<H>  ----------------------------<  105<H>  4.3   |  28  |  0.88    Ca    8.8      14 Nov 2023 08:56  Phos  3.2     11-14  Mg     2.0     11-14    TPro  8.1  /  Alb  3.0<L>  /  TBili  5.3<H>  /  DBili  3.6<H>  /  AST  83<H>  /  ALT  62<H>  /  AlkPhos  135<H>  11-14  LIVER FUNCTIONS - ( 14 Nov 2023 08:56 )  Alb: 3.0 g/dL / Pro: 8.1 g/dL / ALK PHOS: 135 U/L / ALT: 62 U/L / AST: 83 U/L / GGT: x                               8.1    7.65  )-----------( 168      ( 13 Nov 2023 05:30 )             26.6     Urinalysis Basic - ( 14 Nov 2023 08:56 )    Color: x / Appearance: x / SG: x / pH: x  Gluc: 105 mg/dL / Ketone: x  / Bili: x / Urobili: x   Blood: x / Protein: x / Nitrite: x   Leuk Esterase: x / RBC: x / WBC x   Sq Epi: x / Non Sq Epi: x / Bacteria: x    CAPILLARY BLOOD GLUCOSE        MEDICATIONS  (STANDING):  chlorhexidine 0.12% Liquid 15 milliLiter(s) Swish and Spit two times a day  chlorhexidine 2% Cloths 1 Application(s) Topical <User Schedule>  cholestyramine Powder (Sugar-Free) 4 Gram(s) Oral daily  diphenoxylate/atropine 2 Tablet(s) Oral every 6 hours  enoxaparin Injectable 100 milliGRAM(s) SubCutaneous every 12 hours  epoetin matt (EPOGEN) Injectable 89975 Unit(s) SubCutaneous every 7 days  glucagon  Injectable 1 milliGRAM(s) IntraMuscular once  influenza  Vaccine (HIGH DOSE) 0.7 milliLiter(s) IntraMuscular once  levothyroxine Injectable 40 MICROGram(s) IV Push at bedtime  loperamide 4 milliGRAM(s) Oral every 6 hours  metoprolol tartrate Injectable 5 milliGRAM(s) IV Push every 6 hours  Omegaven 10G/100ML 80 Gram(s) 80 Gram(s) (66.67 mL/Hr) IV Continuous <Continuous>  opium Tincture 6 milliGRAM(s) Oral every 6 hours  Parenteral Nutrition - Adult 1 Each TPN Continuous <Continuous>  Parenteral Nutrition - Adult 1 Each TPN Continuous <Continuous>  ursodiol Capsule 300 milliGRAM(s) Oral every 8 hours  venlafaxine XR. 150 milliGRAM(s) Oral two times a day    MEDICATIONS  (PRN):  LORazepam   Injectable 0.5 milliGRAM(s) IV Push at bedtime PRN Anxiety  ondansetron Injectable 4 milliGRAM(s) IV Push every 8 hours PRN Nausea and/or Vomiting      Poole:	  [x ] None	[ ] Daily Poole Order Placed	   Indication:	  [ ] Strict I and O's    [ ] Obstruction     [ ] Incontinence + Stage 3 or 4 Decubitus  Central Line:  [ ] None	   [x ]  Medication / TPN Administration     [ ] No Peripheral IV        S: No new issues/events overnight, still some SOB with exertion, no CP    O: ICU Vital Signs Last 24 Hrs  T(F): 98.3 (11-14-23 @ 09:38), Max: 98.6 (11-14-23 @ 05:08)  HR: 94 (11-14-23 @ 10:30) (93 - 99)  BP: 142/70 (11-14-23 @ 10:30) (138/70 - 179/81)  BP(mean): 100 (11-14-23 @ 10:30) (97 - 116)  ABP: --  RR: 22 (11-14-23 @ 10:30) (18 - 31)  SpO2: 96% (11-14-23 @ 10:30) (92% - 98%)    PHYSICAL EXAM:   Neurological: AAOx3, CNII-XII intact,  strength 5/5 b/l  ENT: mucus membrane moist  Cardiovascular: RRR  Respiratory: CTA  Gastrointestinal: soft, NT, ND, wound manager with grayish output, right sided ostomy bag with scant yellowish drainage.   Extremities: warm, no dependent edema  Vascular: no cyanosis/erythema  Skin: no rashes  MSK: no joint swelling.     LABS:    11-14    138  |  103  |  29<H>  ----------------------------<  105<H>  4.3   |  28  |  0.88    Ca    8.8      14 Nov 2023 08:56  Phos  3.2     11-14  Mg     2.0     11-14    TPro  8.1  /  Alb  3.0<L>  /  TBili  5.3<H>  /  DBili  3.6<H>  /  AST  83<H>  /  ALT  62<H>  /  AlkPhos  135<H>  11-14  LIVER FUNCTIONS - ( 14 Nov 2023 08:56 )  Alb: 3.0 g/dL / Pro: 8.1 g/dL / ALK PHOS: 135 U/L / ALT: 62 U/L / AST: 83 U/L / GGT: x                               8.1    7.65  )-----------( 168      ( 13 Nov 2023 05:30 )             26.6     Urinalysis Basic - ( 14 Nov 2023 08:56 )    Color: x / Appearance: x / SG: x / pH: x  Gluc: 105 mg/dL / Ketone: x  / Bili: x / Urobili: x   Blood: x / Protein: x / Nitrite: x   Leuk Esterase: x / RBC: x / WBC x   Sq Epi: x / Non Sq Epi: x / Bacteria: x    CAPILLARY BLOOD GLUCOSE        MEDICATIONS  (STANDING):  chlorhexidine 0.12% Liquid 15 milliLiter(s) Swish and Spit two times a day  chlorhexidine 2% Cloths 1 Application(s) Topical <User Schedule>  cholestyramine Powder (Sugar-Free) 4 Gram(s) Oral daily  diphenoxylate/atropine 2 Tablet(s) Oral every 6 hours  enoxaparin Injectable 100 milliGRAM(s) SubCutaneous every 12 hours  epoetin matt (EPOGEN) Injectable 96556 Unit(s) SubCutaneous every 7 days  glucagon  Injectable 1 milliGRAM(s) IntraMuscular once  influenza  Vaccine (HIGH DOSE) 0.7 milliLiter(s) IntraMuscular once  levothyroxine Injectable 40 MICROGram(s) IV Push at bedtime  loperamide 4 milliGRAM(s) Oral every 6 hours  metoprolol tartrate Injectable 5 milliGRAM(s) IV Push every 6 hours  Omegaven 10G/100ML 80 Gram(s) 80 Gram(s) (66.67 mL/Hr) IV Continuous <Continuous>  opium Tincture 6 milliGRAM(s) Oral every 6 hours  Parenteral Nutrition - Adult 1 Each TPN Continuous <Continuous>  Parenteral Nutrition - Adult 1 Each TPN Continuous <Continuous>  ursodiol Capsule 300 milliGRAM(s) Oral every 8 hours  venlafaxine XR. 150 milliGRAM(s) Oral two times a day    MEDICATIONS  (PRN):  LORazepam   Injectable 0.5 milliGRAM(s) IV Push at bedtime PRN Anxiety  ondansetron Injectable 4 milliGRAM(s) IV Push every 8 hours PRN Nausea and/or Vomiting      Poole:	  [x ] None	[ ] Daily Poole Order Placed	   Indication:	  [ ] Strict I and O's    [ ] Obstruction     [ ] Incontinence + Stage 3 or 4 Decubitus  Central Line:  [ ] None	   [x ]  Medication / TPN Administration     [ ] No Peripheral IV

## 2023-11-14 NOTE — ADVANCED PRACTICE NURSE CONSULT - ASSESSMENT
New [employer] wound/fistula manager changed, smaller manager used since edges have continued to contract. Output from fistula is slightly thick yellow effluent. Site measures approx 10 x 4 cm with epithelialized edges, dark pink wound bed. Edges protected with stoma paste and stoma rings, bedside drainage bag attached to pouch since diameter of pouch is smaller that previous one and drainage that builds up in pouch could cause dislodgement.

## 2023-11-14 NOTE — PROGRESS NOTE ADULT - ASSESSMENT
Fluid overload most likely etiology  Would give IV lasix this morning and observe response  Will discuss with the ICU team

## 2023-11-14 NOTE — PROGRESS NOTE ADULT - SUBJECTIVE AND OBJECTIVE BOX
SUBJECTIVE:   Patient seen and examined at bedside   SOB noted overnight; c/f atypical PNA and pleural effusions on imaging   Patient well appearing but mildly SOB   No abdominal complaints or concerns       MEDICATIONS  (STANDING):  chlorhexidine 0.12% Liquid 15 milliLiter(s) Swish and Spit two times a day  chlorhexidine 2% Cloths 1 Application(s) Topical <User Schedule>  cholestyramine Powder (Sugar-Free) 4 Gram(s) Oral daily  diphenoxylate/atropine 2 Tablet(s) Oral every 6 hours  enoxaparin Injectable 100 milliGRAM(s) SubCutaneous every 12 hours  epoetin matt (EPOGEN) Injectable 30535 Unit(s) SubCutaneous every 7 days  glucagon  Injectable 1 milliGRAM(s) IntraMuscular once  influenza  Vaccine (HIGH DOSE) 0.7 milliLiter(s) IntraMuscular once  levothyroxine Injectable 40 MICROGram(s) IV Push at bedtime  loperamide 4 milliGRAM(s) Oral every 6 hours  metoprolol tartrate Injectable 5 milliGRAM(s) IV Push every 6 hours  Omegaven 10G/100ML 80 Gram(s) 80 Gram(s) (66.67 mL/Hr) IV Continuous <Continuous>  opium Tincture 6 milliGRAM(s) Oral every 6 hours  Parenteral Nutrition - Adult 1 Each TPN Continuous <Continuous>  Parenteral Nutrition - Adult 1 Each TPN Continuous <Continuous>  ursodiol Capsule 300 milliGRAM(s) Oral every 8 hours  venlafaxine XR. 150 milliGRAM(s) Oral two times a day    MEDICATIONS  (PRN):  LORazepam   Injectable 0.5 milliGRAM(s) IV Push at bedtime PRN Anxiety  ondansetron Injectable 4 milliGRAM(s) IV Push every 8 hours PRN Nausea and/or Vomiting      Vital Signs Last 24 Hrs  T(C): 36.4 (14 Nov 2023 12:00), Max: 37 (14 Nov 2023 05:08)  T(F): 97.6 (14 Nov 2023 12:00), Max: 98.6 (14 Nov 2023 05:08)  HR: 95 (14 Nov 2023 14:00) (93 - 99)  BP: 160/90 (14 Nov 2023 14:00) (142/70 - 164/80)  BP(mean): 116 (14 Nov 2023 14:00) (100 - 116)  RR: 21 (14 Nov 2023 14:00) (18 - 31)  SpO2: 97% (14 Nov 2023 14:00) (92% - 98%)    Parameters below as of 14 Nov 2023 14:00  Patient On (Oxygen Delivery Method): nasal cannula  O2 Flow (L/min): 2      Physical Exam:  General: NAD, resting comfortably in bed  Pulmonary: Nonlabored breathing, no respiratory distress  Cardiovascular: NSR  Abdominal: soft, NT/ND, wound manager in place (1185 cc enteric content), ostomy in place (60 cc), perc cony bilious (670 cc)   : Adequate UOP (1450 cc)   Extremities: WWP, normal strength  Neuro: A/O x 3, CNs II-XII grossly intact, no focal deficits      I&O's Summary    13 Nov 2023 07:01  -  14 Nov 2023 07:00  --------------------------------------------------------  IN: 2658.6 mL / OUT: 3365 mL / NET: -706.4 mL    14 Nov 2023 07:01  -  14 Nov 2023 14:08  --------------------------------------------------------  IN: 0 mL / OUT: 1800 mL / NET: -1800 mL        LABS:                        8.1    7.65  )-----------( 168      ( 13 Nov 2023 05:30 )             26.6     11-14    138  |  103  |  29<H>  ----------------------------<  105<H>  4.3   |  28  |  0.88    Ca    8.8      14 Nov 2023 08:56  Phos  3.2     11-14  Mg     2.0     11-14    TPro  8.1  /  Alb  3.0<L>  /  TBili  5.3<H>  /  DBili  3.6<H>  /  AST  83<H>  /  ALT  62<H>  /  AlkPhos  135<H>  11-14      Urinalysis Basic - ( 14 Nov 2023 08:56 )    Color: x / Appearance: x / SG: x / pH: x  Gluc: 105 mg/dL / Ketone: x  / Bili: x / Urobili: x   Blood: x / Protein: x / Nitrite: x   Leuk Esterase: x / RBC: x / WBC x   Sq Epi: x / Non Sq Epi: x / Bacteria: x      CAPILLARY BLOOD GLUCOSE        LIVER FUNCTIONS - ( 14 Nov 2023 08:56 )  Alb: 3.0 g/dL / Pro: 8.1 g/dL / ALK PHOS: 135 U/L / ALT: 62 U/L / AST: 83 U/L / GGT: x             RADIOLOGY & ADDITIONAL STUDIES:

## 2023-11-14 NOTE — PROGRESS NOTE ADULT - SUBJECTIVE AND OBJECTIVE BOX
ON: Complains of SOB, CT PE protocol no central PE, b/l base effusion R>L, new left upper lung opacity  questionable for edema vs pneumonia vs hemorrhage.       SUBJECTIVE: SOB with minimal exertion.     MEDICATIONS  (STANDING):  chlorhexidine 0.12% Liquid 15 milliLiter(s) Swish and Spit two times a day  chlorhexidine 2% Cloths 1 Application(s) Topical <User Schedule>  cholestyramine Powder (Sugar-Free) 4 Gram(s) Oral daily  diphenoxylate/atropine 2 Tablet(s) Oral every 6 hours  enoxaparin Injectable 100 milliGRAM(s) SubCutaneous every 12 hours  epoetin matt (EPOGEN) Injectable 64957 Unit(s) SubCutaneous every 7 days  glucagon  Injectable 1 milliGRAM(s) IntraMuscular once  influenza  Vaccine (HIGH DOSE) 0.7 milliLiter(s) IntraMuscular once  levothyroxine Injectable 40 MICROGram(s) IV Push at bedtime  loperamide 4 milliGRAM(s) Oral every 6 hours  metoprolol tartrate Injectable 5 milliGRAM(s) IV Push every 6 hours  Omegaven 10G/100ML 80 Gram(s) 80 Gram(s) (66.67 mL/Hr) IV Continuous <Continuous>  opium Tincture 6 milliGRAM(s) Oral every 6 hours  Parenteral Nutrition - Adult 1 Each TPN Continuous <Continuous>  Parenteral Nutrition - Adult 1 Each TPN Continuous <Continuous>  ursodiol Capsule 300 milliGRAM(s) Oral every 8 hours  venlafaxine XR. 150 milliGRAM(s) Oral two times a day    MEDICATIONS  (PRN):  LORazepam   Injectable 0.5 milliGRAM(s) IV Push at bedtime PRN Anxiety  ondansetron Injectable 4 milliGRAM(s) IV Push every 8 hours PRN Nausea and/or Vomiting      Drips:     ICU Vital Signs Last 24 Hrs  T(C): 36.8 (14 Nov 2023 09:38), Max: 37 (14 Nov 2023 05:08)  T(F): 98.3 (14 Nov 2023 09:38), Max: 98.6 (14 Nov 2023 05:08)  HR: 94 (14 Nov 2023 10:30) (93 - 99)  BP: 142/70 (14 Nov 2023 10:30) (138/70 - 179/81)  BP(mean): 100 (14 Nov 2023 10:30) (97 - 116)  ABP: --  ABP(mean): --  RR: 22 (14 Nov 2023 10:30) (18 - 31)  SpO2: 96% (14 Nov 2023 10:30) (92% - 98%)    O2 Parameters below as of 14 Nov 2023 10:30  Patient On (Oxygen Delivery Method): nasal cannula  O2 Flow (L/min): 2            Physical Exam:  General: NAD, resting comfortably in bed  Pulmonary: Nonlabored breathing, no respiratory distress  Cardiovascular: NSR  Abdominal: soft, NT/ND, wound manager in place, ostomy in place, perc cony bilious  Extremities: WWP, normal strength  Neuro: A/O x 3, CNs II-XII grossly intact, no focal deficits        I&O's Summary    13 Nov 2023 07:01  -  14 Nov 2023 07:00  --------------------------------------------------------  IN: 2658.6 mL / OUT: 3365 mL / NET: -706.4 mL    14 Nov 2023 07:01  -  14 Nov 2023 12:02  --------------------------------------------------------  IN: 0 mL / OUT: 1800 mL / NET: -1800 mL        LABS:                        8.1    7.65  )-----------( 168      ( 13 Nov 2023 05:30 )             26.6     11-14    138  |  103  |  29<H>  ----------------------------<  105<H>  4.3   |  28  |  0.88    Ca    8.8      14 Nov 2023 08:56  Phos  3.2     11-14  Mg     2.0     11-14    TPro  8.1  /  Alb  3.0<L>  /  TBili  5.3<H>  /  DBili  3.6<H>  /  AST  83<H>  /  ALT  62<H>  /  AlkPhos  135<H>  11-14      Urinalysis Basic - ( 14 Nov 2023 08:56 )    Color: x / Appearance: x / SG: x / pH: x  Gluc: 105 mg/dL / Ketone: x  / Bili: x / Urobili: x   Blood: x / Protein: x / Nitrite: x   Leuk Esterase: x / RBC: x / WBC x   Sq Epi: x / Non Sq Epi: x / Bacteria: x      CAPILLARY BLOOD GLUCOSE        LIVER FUNCTIONS - ( 14 Nov 2023 08:56 )  Alb: 3.0 g/dL / Pro: 8.1 g/dL / ALK PHOS: 135 U/L / ALT: 62 U/L / AST: 83 U/L / GGT: x             Cultures:    RADIOLOGY & ADDITIONAL STUDIES:  CT chest PE protocol 11/13/23: No central pulmonary emboli. Cannot assess the segmental subsegmental branches of these vessels or suboptimally opacified with contrast. Areas of groundglass attenuation involving the bilateral lung fields most pronounced at the left upper lobe concerning for atypical pneumonia, edema or hemorrhage. Moderate right and small left pleural effusion with overlying compressive atelectatic changes. Ascending aortic aneurysm. Mediastinal adenopathy. Numerous predominantly subcentimeter right greater than left supraclavicular lymph nodes also noted.

## 2023-11-14 NOTE — PROGRESS NOTE ADULT - SUBJECTIVE AND OBJECTIVE BOX
78M with history of Crohn's, AFib/Flutter s/p DCCVs on amiodarone, remote ileocectomy and open appendectomy. Admitted (6/23) for SBO vs Crohns flare, s/p NGT decompression and s/p lap TRE converted to open TRE, SBR x 3, left in discontinuity with abthera vac on (6/27), RTOR for ileocolic resection, small bowel anastomosis, and abdominal wall closure on (6/28), c/b fluid collection s/p IR aspiration of perihepatic fluid on (7/3), c/b wound dehiscence s/p RTOR exlap, washout, ileocolic resection with end ileostomy, blow hole colostomy, red rubber from ileostomy to small bowel anastomosis; vicryl bridging mesh on (7/5) transferred to SICU postoperatively for hemodynamic monitoring, with hospital course complicated by periods of AMS most recently on 9/1 and associated with oliguria, severe ELVI and hyponatremia, which resolved but stepped back up for to SICU on 9/10 for acute AMS, intermittent hypoglycemia, AFib with RVR. Percutaneous Cholecystostomy placed on 9/11 for enlarged GB, PCT capped 10/5, and connected to drainage bag 10/25.    Percutaneous cholecystostomy tube with 795 cc output past 24 hr, previously 573 cc/24h. Flushed easily with 3 cc NS. Dressings CDI.    -Strict I/O, monitor output  -IR will continue to follow

## 2023-11-14 NOTE — PROGRESS NOTE ADULT - ASSESSMENT
78 M w/ Crohn's, AFib/Flutter s/p DCCVs on amiodarone, remote ileocectomy and open appendectomy. Admitted (6/23) for SBO vs Crohns flare, s/p NGT decompression and s/p lap TRE converted to open TRE, SBR x 3, left in discontinuity with abthera vac on (6/27), RTOR for ileocolic resection, small bowel anastomosis, and abdominal wall closure on (6/28), c/b fluid collection s/p IR aspiration of perihepatic fluid on (7/3), c/b wound dehiscence s/p RTOR exlap, washout, ileocolic resection with end ileostomy, blow hole colostomy, red rubber from ileostomy to small bowel anastomosis; vicryl bridging mesh on (7/5) transferred to SICU postoperatively for hemodynamic monitoring, with hospital course complicated by periods of AMS most recently on 9/1 and associated with oliguria, severe ELVI and hyponatremia, which resolved but stepped back up for to SICU on 9/10 for acute AMS, intermittent hypoglycemia, AFib with RVR. Percutaneous Cholecystostomy placed on 9/11 for enlarged GB, PCT capped 10/5. 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 Left Clay drain removed 11/7. Tolerating regular diet. Wound manager with stable output, complaining of shortness of breath, CT showed bilateral mild-to-moderate pleural effusion, ground class opacities c/f atypical PNA, but no evidence of PE.     Plan   - Aggressive IS/chest PT   - Wean O2 as tolerated   - OOB/ambulation   - Low fat diet/TPN   - Appreciate wound care team care   - Appreciate SICU care   - Team 4c following

## 2023-11-14 NOTE — PROGRESS NOTE ADULT - NS ATTEND AMEND GEN_ALL_CORE FT
Crohn's, AF, s.p sbr with ileocolic resection, end ileostomy, DVT RUE, enteroatmospheric fistula  physical as above  CT of chest with ALDEN and smaller RUL infiltrates  left effusion on POCUS is not large  trial of lasix  if not better concern for occult aspiration and/or hemorrhage on the lovenox  repeat activated factor X activity  continue TPN with omegaven  bilirubin is higher, continue actigall for now, check indirect bilirubin  rest as above  decision making of high complexity

## 2023-11-14 NOTE — PROGRESS NOTE ADULT - ASSESSMENT
78M w PMH Crohn's, AFib/Flutter s/p DCCVs on amiodarone, remote ileocectomy and open appendectomy. Admitted (6/23) for SBO vs Crohns flare, s/p NGT decompression and s/p lap converted to open TRE, SBR x 3, left in discontinuity with abthera vac on (6/27), RTOR for ileocolic resection, small bowel anastomosis, and abdominal wall closure on (6/28), c/b fluid collection s/p IR aspiration of perihepatic fluid on (7/3), c/b wound dehiscence s/p RTOR exlap, washout, ileocolic resection with end ileostomy, blow hole colostomy, red rubber from ileostomy to small bowel anastomosis; vicryl bridging mesh on (7/5) transferred to SICU postoperatively for hemodynamic monitoring, with hospital course complicated by periods of severe ELVI and hyponatremia, which resolved but stepped back up for to SICU on (9/10) for acute AMS, intermittent hypoglycemia, AFib with RVR. Percutaneous Cholecystostomy placed on (9/11) for enlarged GB, PCT capped 10/5 and uncapped 10/25 for hyperbilirubinemia--improving after changes to TPN made. Right brachial DVT, left basillic and cephalic superficial thrombus on duplex 11/2, on Lovenox 100 BID.     NEURO: Hospital course with AMS (resolved), EEG neg. Hx of depression - cont Effexor. Insomnia: Melatonin PRN. Nausea: Zofran PRN. Anxiety: Lorazepam PRN.   HEENT: Cepacol prn  CV: Hx Afib/flutter: s/p DCCV,  Amio held for Increased LFT'S. Continue Metoprolol 5q6. Hx HLD: Lipitor held given high LFTs. TTE  (7/18) - PASP 64mmHg, EF 65-70%,  Hx HTN -  holding Norvasc 10qd. Holding Losartan. Cont Lopressor 5 q6. s/p Albumin 25% q8 x3 days. Repeat echo pending  PULM: mild SOB, no sig resp distress on room air. Chest CT no PE, has some pleural effusion R>L, and left upper patchy ground glass opacities -- ??micro-aspiration??, POCUS not much B lines, has bilateral effusion, but not too impressive. will give trial of lasix 20 IV today.   GI/FEN: High protein diet. Cont Ensure max x1/day. Cont TPN/ lipids - Continue Omegavan to reduce cholecystasis:  (2.3 L/day volume) for high ostomy and ECF output: Cont Octreotide in TPN, Imodium, Lomotil, Tincture of Opium, Cholestyramine 10/18. Transaminitis of unknown origin, with downtrending LFTs, stable Tbili. s/p Perc Choley on 9/11; MRCP with no obstruction, done as per Hepatology started on ursidiol, cont PPI. monitor drainage from fistula, bolus as needed to keep I&O even. GI following, considering Gattex. Overnight ECF output red/brown concerning for blood, fecal occult blood positive at the time. Pending AM CBC.  : Voids.   ENDO: Hx hypothyroid: IV Synthroid, TSH wnl on 10/23  ID: Bld Cx's NGTD. As per Hepatology will get Fungal Bld cx's.  Currently off abx. Cont Nystatin powder // PREVIOUSLY had C. tertium, Lactobacillis from his IR cx 7/3, and candida albicans, lactobacillus, vanc sensitive E faecium, vanc resistant E gallinarum, vanc resistant E casseliflavus, lactobacillus from his OR cx 7/5. Completed course of abx with Imipenem (9/10-9/15). Imipenem (8/26--) imipenem (6/30-7/12, 7/23-7/30), Dapto (6/30-7/5 and 7/23-7/24). Empiric dapto (8/23-24) and cefepime (8/23-24).   PPX: SCDs, Therapeutic lovenox 2/2 R brachial vein DVT (11/3 - )  HEME: continue Iron Sucrose 200 qd x 3 days for chronic anemia.  LINES: PIVs, RUE PICC: (11/1-) DC'd: LUE PICC (9/1-11/1 )  WOUNDS/DRAINS: Abdominal wound and fistula with wound manager in place dressing change T & F. RLQ Ostomy. IR perc cony. // DC: L Clay drain.  PT: Ambulate as tolerated   DISPO: SICU due to complicated hospital course. but will do vitals q4hrs (hold overnight), and CBC/BMP QD in setting of high fistula output.   78M w PMH Crohn's, AFib/Flutter s/p DCCVs on amiodarone, remote ileocectomy and open appendectomy. Admitted (6/23) for SBO vs Crohns flare, s/p NGT decompression and s/p lap converted to open TRE, SBR x 3, left in discontinuity with abthera vac on (6/27), RTOR for ileocolic resection, small bowel anastomosis, and abdominal wall closure on (6/28), c/b fluid collection s/p IR aspiration of perihepatic fluid on (7/3), c/b wound dehiscence s/p RTOR exlap, washout, ileocolic resection with end ileostomy, blow hole colostomy, red rubber from ileostomy to small bowel anastomosis; vicryl bridging mesh on (7/5) transferred to SICU postoperatively for hemodynamic monitoring, with hospital course complicated by periods of severe ELVI and hyponatremia, which resolved but stepped back up for to SICU on (9/10) for acute AMS, intermittent hypoglycemia, AFib with RVR. Percutaneous Cholecystostomy placed on (9/11) for enlarged GB, PCT capped 10/5 and uncapped 10/25 for hyperbilirubinemia--improving after changes to TPN made. Right brachial DVT, left basillic and cephalic superficial thrombus on duplex 11/2, on Lovenox 100 BID.     NEURO: Hospital course with AMS (resolved), EEG neg. Hx of depression - cont Effexor. Insomnia: Melatonin PRN. Nausea: Zofran PRN. Anxiety: Lorazepam PRN.   HEENT: Cepacol prn  CV: Hx Afib/flutter: s/p DCCV,  Amio held for Increased LFT'S. Continue Metoprolol 5q6. Hx HLD: Lipitor held given high LFTs. TTE  (7/18) - PASP 64mmHg, EF 65-70%,  Hx HTN -  holding Norvasc 10qd. Holding Losartan. Cont Lopressor 5 q6. s/p Albumin 25% q8 x3 days. Repeat echo pending  PULM: mild SOB, no sig resp distress on room air. Chest CT no PE, has some pleural effusion R>L, and left upper patchy ground glass opacities -- ??micro-aspiration??, POCUS not much B lines, has bilateral effusion, but not too impressive. will give trial of lasix 20 IV today.   GI/FEN: High protein diet. Cont Ensure max x1/day. Cont TPN/ lipids - Continue Omegavan to reduce cholecystasis:  (2.3 L/day volume) for high ostomy and ECF output: Cont Octreotide in TPN, Imodium, Lomotil, Tincture of Opium, Cholestyramine 10/18. Transaminitis of unknown origin, with downtrending LFTs, stable Tbili. s/p Perc Choley on 9/11; MRCP with no obstruction, done as per Hepatology started on ursidiol, cont PPI. monitor drainage from fistula, bolus as needed to keep I&O even. GI following, considering Gattex. Overnight ECF output red/brown concerning for blood, fecal occult blood positive at the time. Pending AM CBC.  : Voids.   ENDO: Hx hypothyroid: IV Synthroid, TSH wnl on 10/23  ID: Bld Cx's NGTD. As per Hepatology will get Fungal Bld cx's.  Currently off abx. Cont Nystatin powder // PREVIOUSLY had C. tertium, Lactobacillis from his IR cx 7/3, and candida albicans, lactobacillus, vanc sensitive E faecium, vanc resistant E gallinarum, vanc resistant E casseliflavus, lactobacillus from his OR cx 7/5. Completed course of abx with Imipenem (9/10-9/15). Imipenem (8/26--) imipenem (6/30-7/12, 7/23-7/30), Dapto (6/30-7/5 and 7/23-7/24). Empiric dapto (8/23-24) and cefepime (8/23-24).   PPX: SCDs, Therapeutic lovenox 2/2 R brachial vein DVT (11/3 - )  HEME: continue Iron Sucrose 200 qd x 3 days for chronic anemia.  LINES: PIVs, RUE PICC: (11/1-) DC'd: LUE PICC (9/1-11/1 )  WOUNDS/DRAINS: Abdominal wound and fistula with wound manager in place dressing change T & F. RLQ Ostomy. IR perc cony. // DC: L Clay drain.  PT: Ambulate as tolerated   DISPO: SICU due to complicated hospital course. but will do vitals q4hrs (hold overnight), and CBC qod/BMP QD in setting of high fistula output.

## 2023-11-14 NOTE — CHART NOTE - NSCHARTNOTEFT_GEN_A_CORE
Nutrition Interim Note:  Discussed with team, current diet order of TPN + Regular PO diet + 1 LPS BID [200 kcals, 30g protein] + 1 Ensure Max daily [150 kcals, 30g protein]. Receiving omegaven lipids due to cholestasis. Noted improvement in ALT & AST, T. bili & direct bili previously improving following change to Omegaven, noted T. Bili of 5.3 [<-- 4.9] & direct bili of 3.6 today [<-- 3.0]. Pt noted with good appetite & PO intake. Plan to change regular PO diet to low fat diet today & monitor LFTs. Consider cycling TPN x 12-16hrs, reducing lipids if no improvement. Diet education provided to pt on recommendations & current diet order, all questions answered.     To continue following at high risk, reconsult prn.   Bella Leung, MS, RDN, CDN [ext. [phone]]

## 2023-11-14 NOTE — PROGRESS NOTE ADULT - ASSESSMENT
78 M w/ Crohn's, AFib/Flutter s/p DCCVs on amiodarone, remote ileocectomy and open appendectomy. Admitted (6/23) for SBO vs Crohns flare, s/p NGT decompression and s/p lap TRE converted to open TRE, SBR x 3, left in discontinuity with abthera vac on (6/27), RTOR for ileocolic resection, small bowel anastomosis, and abdominal wall closure on (6/28), c/b fluid collection s/p IR aspiration of perihepatic fluid on (7/3), c/b wound dehiscence s/p RTOR exlap, washout, ileocolic resection with end ileostomy, blow hole colostomy, red rubber from ileostomy to small bowel anastomosis; vicryl bridging mesh on (7/5) transferred to SICU postoperatively for hemodynamic monitoring, with hospital course complicated by periods of AMS most recently on 9/1 and associated with oliguria, severe ELVI and hyponatremia, which resolved but stepped back up for to SICU on 9/10 for acute AMS, intermittent hypoglycemia, AFib with RVR. Percutaneous Cholecystostomy placed on 9/11 for enlarged GB, PCT capped 10/5. 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 Left Clay drain removed 11/7. Tolerating regular diet. Wound manager with stable output, complaining of shortness of breath, CT negative for PE, bilateral trace pleural effusion.     Plan   - Consider Lasix for volume overload   - Rest of care as per SICU   - Team 5 will follow

## 2023-11-14 NOTE — PROGRESS NOTE ADULT - SUBJECTIVE AND OBJECTIVE BOX
SOB since yesterday afternoon  Had Echo and PE study  Ok with nasal O2 this am No chest pain or pleuritic pain VS stable Afeb In RR Decreased BS at bases Slight LE edema

## 2023-11-15 NOTE — PROGRESS NOTE ADULT - ASSESSMENT
*INCOMPLETE*     78M with PMH of Crohn's, AFib/Flutter (s/p DCCVs, previously on amiodarone but now off of it for a few months), remote ileocectomy and open appendectomy (nearly 40 years ago). Patient was admitted this admission (06/23) for SBO vs. Crohn's flare, s/p NGT decompression and s/p lap TRE converted to open TRE, SBR x 3, left in discontinuity with abthera vac on (6/27), RTOR for ileocolic resection, small bowel anastomosis, and abdominal wall closure on (6/28), c/b fluid collection s/p IR aspiration of perihepatic fluid on (7/3), c/b wound dehiscence s/p RTOR exlap, washout, ileocolic resection with end ileostomy, blow hole colostomy, red rubber from ileostomy to small bowel anastomosis; vicryl bridging mesh on (7/5) transferred to SICU postoperatively for hemodynamic monitoring. Hospital course complicated by periods of AMS (most recently on 9/1 and associated with oliguria, severe ELVI and hyponatremia), which resolved. Stepped back up for to SICU on 9/10 for acute AMS, intermittent hypoglycemia, AFib with RVR. Percutaneous cholecystostomy placed on 9/11 for enlarged GB, PCT capped on 10/5. 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 Patient has been followed by Dr. Yuan from GI in the hospital. Hepatology team consulted for elevated liver enzymes and elevated bilirubin.    Chart reviewed and patient's liver enzymes began increasing mildly around early to mid September, and have been gradually increasing ever since then fairly proportionally.   - 09/05/23: AST/AT 44/31  - 09/18/23: AST/ALT 71/46  - 10/15/23: AST/ALT 80/90   - 11/01/23: AST//85    Was started on ursodiol on 11/01.     Difficult to pinpoint the exact cause of patient's elevated liver enzymes, but most likely elevated 2/2 cholestasis of illness vs. DILI (has received amiodarone, ceftriaxone, hydralazine during this admission) vs. 2/2 TPN vs. 2/2 possible irritation of the liver by perc cony drain catheter. CT abdomen/pelvis does not reveal any obstructive process.     CT abdomen/pelvis (11/01/23):   LIVER: Small cyst peripherally in the posterior segment of the right lobe. Tiny hypodensities in the right lobe at the dome too small to characterize and unchanged  BILE DUCTS: Normal caliber.  GALLBLADDER: Decompressed by a cholecystostomy tube  PANCREAS: Cystic foci are again seen. Mild dilatation of the pancreatic duct is also again noted  BOWEL: No bowel obstruction. Mild colonic diverticulosis without diverticulitis.  PERITONEUM: 2 abdominal drainage catheters are again appreciated with the tips in the mid to lower abdomen.  ABDOMINAL WALL: Midline anterior abdominal wall defects again seen. Ostomy again noted. Fat-containing left inguinal hernia.    Liver enzymes today basically unchanged from day prior.     Recommendations:   - send serum fungitell and fungal blood cultures given patient reports rigoring while on TPN   - continue ursodiol 300 mg TID   - continue to trend liver enzymes with daily CMP   - avoid hepatotoxic medications   - if cholestasis of illness, anticipate that liver enzymes will improve as patient improves clinically overall    We will sign off, but please page if any further questions arise. Please see attending addendum for final recommendations.    Esme Barber D.O.   Gastroenterology Fellow  Weekday 7am-5pm Pager: [phone]  Weeknights/Weekend/Holiday Coverage: Please call the  for contact info   78M with PMH of Crohn's, AFib/Flutter (s/p DCCVs, previously on amiodarone but now off of it for a few months), remote ileocectomy and open appendectomy (nearly 40 years ago). Patient was admitted this admission (06/23) for SBO vs. Crohn's flare, s/p NGT decompression and s/p lap TRE converted to open TRE, SBR x 3, left in discontinuity with abthera vac on (6/27), RTOR for ileocolic resection, small bowel anastomosis, and abdominal wall closure on (6/28), c/b fluid collection s/p IR aspiration of perihepatic fluid on (7/3), c/b wound dehiscence s/p RTOR exlap, washout, ileocolic resection with end ileostomy, blow hole colostomy, red rubber from ileostomy to small bowel anastomosis; vicryl bridging mesh on (7/5) transferred to SICU postoperatively for hemodynamic monitoring. Hospital course complicated by periods of AMS (most recently on 9/1 and associated with oliguria, severe ELVI and hyponatremia), which resolved. Stepped back up for to SICU on 9/10 for acute AMS, intermittent hypoglycemia, AFib with RVR. Percutaneous cholecystostomy placed on 9/11 for enlarged GB, PCT capped on 10/5. 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 Patient has been followed by Dr. Yuan from GI in the hospital. Hepatology team consulted for elevated liver enzymes and elevated bilirubin.    Chart reviewed and patient's liver enzymes began increasing mildly around early to mid September, and have been gradually increasing ever since then fairly proportionally.   - 09/05/23: AST/AT 44/31  - 09/18/23: AST/ALT 71/46  - 10/15/23: AST/ALT 80/90   - 11/01/23: AST//85    Was started on ursodiol on 11/01 with some improvement in AST/ALT and bilirubin. Hepatology reconsulted as AST/ALT and bilirubin now again up-trending.     Difficult to pinpoint the exact cause of patient's elevated liver enzymes, but most likely elevated 2/2 cholestasis of illness vs. DILI (has received amiodarone, ceftriaxone, hydralazine during this admission) vs. 2/2 TPN vs. 2/2 possible irritation of the liver by perc cony drain catheter. CT abdomen/pelvis does not reveal any obstructive process. Serum fungitell negative.     CT abdomen/pelvis (11/01/23):   LIVER: Small cyst peripherally in the posterior segment of the right lobe. Tiny hypodensities in the right lobe at the dome too small to characterize and unchanged  BILE DUCTS: Normal caliber.  GALLBLADDER: Decompressed by a cholecystostomy tube  PANCREAS: Cystic foci are again seen. Mild dilatation of the pancreatic duct is also again noted  BOWEL: No bowel obstruction. Mild colonic diverticulosis without diverticulitis.  PERITONEUM: 2 abdominal drainage catheters are again appreciated with the tips in the mid to lower abdomen.  ABDOMINAL WALL: Midline anterior abdominal wall defects again seen. Ostomy again noted. Fat-containing left inguinal hernia.    Recommendations:   - continue ursodiol 300 mg TID   - continue to trend liver enzymes with daily CMP   - avoid hepatotoxic medications   - if cholestasis of illness, anticipate that liver enzymes will improve as patient improves clinically overall  - speak with IR about amount of out-put from per cony drainage bag     Case discussed with Dr. Carrillo. Hepatology Team will continue to follow.     Esme Barber D.O.   Gastroenterology Fellow  Weekday 7am-5pm Pager: [phone]  Weeknights/Weekend/Holiday Coverage: Please call the  for contact info

## 2023-11-15 NOTE — PROGRESS NOTE ADULT - ASSESSMENT
78 M w/ Crohn's, AFib/Flutter s/p DCCVs on amiodarone, remote ileocectomy and open appendectomy. Admitted (6/23) for SBO vs Crohns flare, s/p NGT decompression and s/p lap TRE converted to open TRE, SBR x 3, left in discontinuity with abthera vac on (6/27), RTOR for ileocolic resection, small bowel anastomosis, and abdominal wall closure on (6/28), c/b fluid collection s/p IR aspiration of perihepatic fluid on (7/3), c/b wound dehiscence s/p RTOR exlap, washout, ileocolic resection with end ileostomy, blow hole colostomy, red rubber from ileostomy to small bowel anastomosis; vicryl bridging mesh on (7/5) transferred to SICU postoperatively for hemodynamic monitoring, with hospital course complicated by periods of AMS most recently on 9/1 and associated with oliguria, severe ELVI and hyponatremia, which resolved but stepped back up for to SICU on 9/10 for acute AMS, intermittent hypoglycemia, AFib with RVR. Percutaneous Cholecystostomy placed on 9/11 for enlarged GB, PCT capped 10/5. 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 Left Clay drain removed 11/7. Tolerating regular diet. Wound manager with stable output, complaining of shortness of breath, CT 11/13 showed bilateral mild-to-moderate pleural effusion, ground class opacities c/f atypical PNA, but no evidence of PE. COVID negative     Plan   - Aggressive IS/chest PT   - R/o Flu   - Wean O2 as tolerated   - OOB/ambulation   - Low fat diet/TPN   - Appreciate wound care team care   - Appreciate SICU care   - Team 4c following     D/w chief resident and attending

## 2023-11-15 NOTE — PROGRESS NOTE ADULT - SUBJECTIVE AND OBJECTIVE BOX
Appropriate diuresis with Lasix  Feels slightly improved  Still some shortness of breath  AVSS  Wound clean  Outputs stable  Increased urine output as expected  Bilirubin elevated today  Lytes normal  H&H stable  WBC slightly Elevated      Reconsult hepatology  Out of bed  Encourage incentive spirometry

## 2023-11-15 NOTE — PROGRESS NOTE ADULT - ASSESSMENT
78 M w/ Crohn's, AFib/Flutter s/p DCCVs on amiodarone, remote ileocectomy and open appendectomy. Admitted (6/23) for SBO vs Crohns flare, s/p NGT decompression and s/p lap TRE converted to open TRE, SBR x 3, left in discontinuity with abthera vac on (6/27), RTOR for ileocolic resection, small bowel anastomosis, and abdominal wall closure on (6/28), c/b fluid collection s/p IR aspiration of perihepatic fluid on (7/3), c/b wound dehiscence s/p RTOR exlap, washout, ileocolic resection with end ileostomy, blow hole colostomy, red rubber from ileostomy to small bowel anastomosis; vicryl bridging mesh on (7/5) transferred to SICU postoperatively for hemodynamic monitoring, with hospital course complicated by periods of AMS most recently on 9/1 and associated with oliguria, severe ELVI and hyponatremia, which resolved but stepped back up for to SICU on 9/10 for acute AMS, intermittent hypoglycemia, AFib with RVR. Percutaneous Cholecystostomy placed on 9/11 for enlarged GB, PCT capped 10/5. 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 Left Clay drain removed 11/7. Tolerating regular diet. Wound manager with stable output, complaining of shortness of breath, CT negative for PE, bilateral trace pleural effusion. SOB improving with Duoneb Elevated WBC and LFT this AM.    Plan   - Hepatology consult regarding LFT.  - Rest of care as per SICU   - Team 5 will follow

## 2023-11-15 NOTE — PROGRESS NOTE ADULT - ASSESSMENT
78M w PMH Crohn's, AFib/Flutter s/p DCCVs on amiodarone, remote ileocectomy and open appendectomy. Admitted (6/23) for SBO vs Crohns flare, s/p NGT decompression and s/p lap converted to open TRE, SBR x 3, left in discontinuity with abthera vac on (6/27), RTOR for ileocolic resection, small bowel anastomosis, and abdominal wall closure on (6/28), c/b fluid collection s/p IR aspiration of perihepatic fluid on (7/3), c/b wound dehiscence s/p RTOR exlap, washout, ileocolic resection with end ileostomy, blow hole colostomy, red rubber from ileostomy to small bowel anastomosis; vicryl bridging mesh on (7/5) transferred to SICU postoperatively for hemodynamic monitoring, with hospital course complicated by periods of severe ELVI and hyponatremia, which resolved but stepped back up for to SICU on (9/10) for acute AMS, intermittent hypoglycemia, AFib with RVR. Percutaneous Cholecystostomy placed on (9/11) for enlarged GB, PCT capped 10/5 and uncapped 10/25 for hyperbilirubinemia--improving after changes to TPN made. Right brachial DVT, left basillic and cephalic superficial thrombus on duplex 11/2, on Lovenox 100 BID.     NEURO: Hospital course with AMS (resolved), EEG neg. Hx of depression - cont Effexor. Insomnia: Melatonin PRN. Nausea: Zofran PRN. Anxiety: Lorazepam PRN.   HEENT: Cepacol prn  CV: Hx Afib/flutter: s/p DCCV,  Amio held for Increased LFT'S. Continue Metoprolol 5q6. Hx HLD: Lipitor held given high LFTs. TTE  (7/18) - PASP 64mmHg, EF 65-70%,  Hx HTN -  holding Norvasc 10qd. Holding Losartan. Cont Lopressor 5 q6. s/p Albumin 25% q8 x3 days. Repeat echo pending  PULM: mild SOB, no sig resp distress on room air. Chest CT no PE, has some pleural effusion R>L, and left upper patchy ground glass opacities -- ??micro-aspiration??, POCUS not much B lines, has bilateral effusion, but not too impressive. COVID negative. check RVP panel, MRSA surveillance swab negative. empiric cefepime for now.   GI/FEN: High protein diet. Cont Ensure max x1/day. Cont TPN/ lipids - Continue Omegavan to reduce cholecystasis:  (2.3 L/day volume) for high ostomy and ECF output: Cont Octreotide in TPN, Imodium, Lomotil, Tincture of Opium, Cholestyramine 10/18. Transaminitis of unknown origin, with downtrending LFTs, stable Tbili. s/p Perc Choley on 9/11; MRCP with no obstruction, done as per Hepatology started on ursidiol, cont PPI. monitor drainage from fistula, bolus as needed to keep I&O even. GI following, considering Gattex. Tbili back up to 6.1 - consulted Hepatology. lowered Carbs and fat in TPN/lipids.,   : Voids.   ENDO: Hx hypothyroid: IV Synthroid, TSH wnl on 10/23  ID: chest CT with ALDEN with ground glass opacities, afebrile and unimpressive leukocytosis, unclear etiology. will give empiric cefepime for now. (11/15--) Bld Cx's NGTD. Cont Nystatin powder // PREVIOUSLY had C. tertium, Lactobacillis from his IR cx 7/3, and candida albicans, lactobacillus, vanc sensitive E faecium, vanc resistant E gallinarum, vanc resistant E casseliflavus, lactobacillus from his OR cx 7/5. Completed course of abx with Imipenem (9/10-9/15). Imipenem (8/26--) imipenem (6/30-7/12, 7/23-7/30), Dapto (6/30-7/5 and 7/23-7/24). Empiric dapto (8/23-24) and cefepime (8/23-24).   PPX: SCDs, Therapeutic lovenox 2/2 R brachial vein DVT (11/3 - )  HEME: continue Iron Sucrose 200 qd x 3 days for chronic anemia.  LINES: PIVs, RUE PICC: (11/1-) DC'd: LUE PICC (9/1-11/1 )  WOUNDS/DRAINS: Abdominal wound and fistula with wound manager in place dressing change T & F. RLQ Ostomy. IR perc cony. // DC: L Clay drain.  PT: Ambulate as tolerated   DISPO: SICU due to complicated hospital course. but will do vitals q4hrs (hold overnight), and CBC qod/BMP QD in setting of high fistula output.   78M w PMH Crohn's, AFib/Flutter s/p DCCVs on amiodarone, remote ileocectomy and open appendectomy. Admitted (6/23) for SBO vs Crohns flare, s/p NGT decompression and s/p lap converted to open TRE, SBR x 3, left in discontinuity with abthera vac on (6/27), RTOR for ileocolic resection, small bowel anastomosis, and abdominal wall closure on (6/28), c/b fluid collection s/p IR aspiration of perihepatic fluid on (7/3), c/b wound dehiscence s/p RTOR exlap, washout, ileocolic resection with end ileostomy, blow hole colostomy, red rubber from ileostomy to small bowel anastomosis; vicryl bridging mesh on (7/5) transferred to SICU postoperatively for hemodynamic monitoring, with hospital course complicated by periods of severe ELVI and hyponatremia, which resolved but stepped back up for to SICU on (9/10) for acute AMS, intermittent hypoglycemia, AFib with RVR. Percutaneous Cholecystostomy placed on (9/11) for enlarged GB, PCT capped 10/5 and uncapped 10/25 for hyperbilirubinemia--improving after changes to TPN made. Right brachial DVT, left basillic and cephalic superficial thrombus on duplex 11/2, on Lovenox 100 BID.     NEURO: Hospital course with AMS (resolved), EEG neg. Hx of depression - cont Effexor. Insomnia: Melatonin PRN. Nausea: Zofran PRN. Anxiety: Lorazepam PRN.   HEENT: Cepacol prn  CV: Hx Afib/flutter: s/p DCCV,  Amio held for Increased LFT'S. Continue Metoprolol 5q6. Hx HLD: Lipitor held given high LFTs. TTE  (7/18) - PASP 64mmHg, EF 65-70%,  Hx HTN -  holding Norvasc 10qd. Holding Losartan. Cont Lopressor 5 q6. s/p Albumin 25% q8 x3 days. Repeat echo pending  PULM: mild SOB, no sig resp distress on room air. Chest CT no PE, has some pleural effusion R>L, and left upper patchy ground glass opacities -- ??micro-aspiration??, POCUS not much B lines, has bilateral effusion, but not too impressive. COVID negative. check RVP panel, MRSA surveillance swab negative. empiric cefepime for now.   GI/FEN: High protein diet. Cont Ensure max x1/day. Cont TPN/ lipids - Continue Omegavan to reduce cholecystasis:  (2.3 L/day volume) for high ostomy and ECF output: Cont Octreotide in TPN, Imodium, Lomotil, Tincture of Opium, Cholestyramine 10/18. Transaminitis of unknown origin, with downtrending LFTs, stable Tbili. s/p Perc Choley on 9/11; MRCP with no obstruction, done as per Hepatology started on ursidiol, cont PPI. monitor drainage from fistula, bolus as needed to keep I&O even. GI following, considering Gattex. Tbili back up to 6.1 - consulted Hepatology. lowered Carbs and fat in TPN/lipids.,   : Voids.   ENDO: Hx hypothyroid: IV Synthroid, TSH wnl on 10/23  ID: chest CT with ALDEN with ground glass opacities, afebrile and unimpressive leukocytosis, unclear etiology. will give empiric cefepime for now. (11/15--) Bld Cx's NGTD. Cont Nystatin powder // PREVIOUSLY had C. tertium, Lactobacillis from his IR cx 7/3, and candida albicans, lactobacillus, vanc sensitive E faecium, vanc resistant E gallinarum, vanc resistant E casseliflavus, lactobacillus from his OR cx 7/5. Completed course of abx with Imipenem (9/10-9/15). Imipenem (8/26--) imipenem (6/30-7/12, 7/23-7/30), Dapto (6/30-7/5 and 7/23-7/24). Empiric dapto (8/23-24) and cefepime (8/23-24).   PPX: SCDs, Therapeutic lovenox due to R brachial vein DVT (11/3 - )  HEME: continue Iron Sucrose 200 qd x 3 days for chronic anemia.  LINES: PIVs, RUE PICC: (11/1-) DC'd: LUE PICC (9/1-11/1 )  WOUNDS/DRAINS: Abdominal wound and fistula with wound manager in place dressing change T & F. RLQ Ostomy. IR perc cony. // DC: L Clay drain.  PT: Ambulate as tolerated   DISPO: SICU due to complicated hospital course. but will do vitals q4hrs (hold overnight), and CBC qod/BMP QD in setting of high fistula output.

## 2023-11-15 NOTE — PROGRESS NOTE ADULT - SUBJECTIVE AND OBJECTIVE BOX
Hepatology Consult Progress Note:     Hepatology re-consulted as patient's liver enzymes and bilirubin initially down-trended on ursodiol, but are now again up-trending.       OVERNIGHT EVENTS: AMRITA.    SUBJECTIVE / INTERVAL HPI:   Patient seen and examined at bedside.         VITAL SIGNS:  Vital Signs Last 24 Hrs  T(C): 37 (15 Nov 2023 09:06), Max: 37 (15 Nov 2023 09:06)  T(F): 98.6 (15 Nov 2023 09:06), Max: 98.6 (15 Nov 2023 09:06)  HR: 94 (15 Nov 2023 09:15) (92 - 96)  BP: 136/66 (15 Nov 2023 09:15) (120/57 - 160/90)  BP(mean): 95 (15 Nov 2023 09:15) (82 - 116)  RR: 26 (15 Nov 2023 09:15) (17 - 26)  SpO2: 96% (15 Nov 2023 09:15) (94% - 97%)    Parameters below as of 15 Nov 2023 09:15  Patient On (Oxygen Delivery Method): nasal cannula  O2 Flow (L/min): 2      11-14-23 @ 07:01  -  11-15-23 @ 07:00  --------------------------------------------------------  IN: 3260.4 mL / OUT: 4915 mL / NET: -1654.6 mL    11-15-23 @ 07:01  -  11-15-23 @ 12:50  --------------------------------------------------------  IN: 405 mL / OUT: 250 mL / NET: 155 mL        PHYSICAL EXAM:    General: WDWN  HEENT: NC/AT; PERRL, anicteric sclera; MMM  Neck: supple  Cardiovascular: +S1/S2; RRR  Respiratory: CTA B/L; no W/R/R  Gastrointestinal: soft, NT/ND; +BSx4  Extremities: WWP; no edema, clubbing or cyanosis  Vascular: 2+ radial, DP/PT pulses B/L  Neurological: AAOx3; no focal deficits    MEDICATIONS:  MEDICATIONS  (STANDING):  chlorhexidine 0.12% Liquid 15 milliLiter(s) Swish and Spit two times a day  chlorhexidine 2% Cloths 1 Application(s) Topical <User Schedule>  cholestyramine Powder (Sugar-Free) 4 Gram(s) Oral daily  diphenoxylate/atropine 2 Tablet(s) Oral every 6 hours  enoxaparin Injectable 100 milliGRAM(s) SubCutaneous every 12 hours  epoetin matt (EPOGEN) Injectable 37128 Unit(s) SubCutaneous every 7 days  glucagon  Injectable 1 milliGRAM(s) IntraMuscular once  influenza  Vaccine (HIGH DOSE) 0.7 milliLiter(s) IntraMuscular once  levothyroxine Injectable 40 MICROGram(s) IV Push at bedtime  loperamide 4 milliGRAM(s) Oral every 6 hours  metoprolol tartrate Injectable 5 milliGRAM(s) IV Push every 6 hours  Omegaven 10 g/100 mL 50 Gram(s) 50 Gram(s) (41.67 mL/Hr) IV Continuous <Continuous>  opium Tincture 6 milliGRAM(s) Oral every 6 hours  Parenteral Nutrition - Adult 1 Each TPN Continuous <Continuous>  Parenteral Nutrition - Adult 1 Each TPN Continuous <Continuous>  sodium chloride 3%  Inhalation 4 milliLiter(s) Inhalation once  sodium chloride 7% Inhalation 4 milliLiter(s) Inhalation once  ursodiol Capsule 300 milliGRAM(s) Oral every 8 hours  venlafaxine XR. 150 milliGRAM(s) Oral two times a day    MEDICATIONS  (PRN):  ondansetron Injectable 4 milliGRAM(s) IV Push every 8 hours PRN Nausea and/or Vomiting      ALLERGIES:  Allergies    penicillin (Angioedema)    Intolerances        LABS:                        8.1    11.53 )-----------( 158      ( 15 Nov 2023 05:30 )             26.7     11-15    138  |  102  |  29<H>  ----------------------------<  108<H>  3.8   |  28  |  0.97    Ca    9.0      15 Nov 2023 05:30  Phos  3.3     11-15  Mg     2.0     11-15    TPro  8.2  /  Alb  2.8<L>  /  TBili  6.1<H>  /  DBili  3.9<H>  /  AST  82<H>  /  ALT  64<H>  /  AlkPhos  128<H>  11-15      Urinalysis Basic - ( 15 Nov 2023 05:30 )    Color: x / Appearance: x / SG: x / pH: x  Gluc: 108 mg/dL / Ketone: x  / Bili: x / Urobili: x   Blood: x / Protein: x / Nitrite: x   Leuk Esterase: x / RBC: x / WBC x   Sq Epi: x / Non Sq Epi: x / Bacteria: x      CAPILLARY BLOOD GLUCOSE            RADIOLOGY & ADDITIONAL TESTS: Reviewed.    ASSESSMENT:    PLAN:      Hepatology Consult Progress Note:     Hepatology re-consulted as patient's liver enzymes and bilirubin initially down-trended on ursodiol, but are now again up-trending.     OVERNIGHT EVENTS: AMRITA.    SUBJECTIVE / INTERVAL HPI:   Patient seen and examined at bedside. States that overall he feels well. Sitting up in a chair and has no acute complaints at this time. Says that his perc cony drainage bag continues to have a large amount of out-put       VITAL SIGNS:  Vital Signs Last 24 Hrs  T(C): 37 (15 Nov 2023 09:06), Max: 37 (15 Nov 2023 09:06)  T(F): 98.6 (15 Nov 2023 09:06), Max: 98.6 (15 Nov 2023 09:06)  HR: 94 (15 Nov 2023 09:15) (92 - 96)  BP: 136/66 (15 Nov 2023 09:15) (120/57 - 160/90)  BP(mean): 95 (15 Nov 2023 09:15) (82 - 116)  RR: 26 (15 Nov 2023 09:15) (17 - 26)  SpO2: 96% (15 Nov 2023 09:15) (94% - 97%)    Parameters below as of 15 Nov 2023 09:15  Patient On (Oxygen Delivery Method): nasal cannula  O2 Flow (L/min): 2      11-14-23 @ 07:01  -  11-15-23 @ 07:00  --------------------------------------------------------  IN: 3260.4 mL / OUT: 4915 mL / NET: -1654.6 mL    11-15-23 @ 07:01  -  11-15-23 @ 12:50  --------------------------------------------------------  IN: 405 mL / OUT: 250 mL / NET: 155 mL        PHYSICAL EXAM:    General: WDWN  HEENT: NC/AT; PERRL, anicteric sclera; MMM  Neck: supple  Cardiovascular: +S1/S2; RRR  Respiratory: CTA B/L; no W/R/R  Gastrointestinal: soft, NT/ND; +BSx4  Extremities: WWP; no edema, clubbing or cyanosis  Vascular: 2+ radial, DP/PT pulses B/L  Neurological: AAOx3; no focal deficits    MEDICATIONS:  MEDICATIONS  (STANDING):  chlorhexidine 0.12% Liquid 15 milliLiter(s) Swish and Spit two times a day  chlorhexidine 2% Cloths 1 Application(s) Topical <User Schedule>  cholestyramine Powder (Sugar-Free) 4 Gram(s) Oral daily  diphenoxylate/atropine 2 Tablet(s) Oral every 6 hours  enoxaparin Injectable 100 milliGRAM(s) SubCutaneous every 12 hours  epoetin matt (EPOGEN) Injectable 51241 Unit(s) SubCutaneous every 7 days  glucagon  Injectable 1 milliGRAM(s) IntraMuscular once  influenza  Vaccine (HIGH DOSE) 0.7 milliLiter(s) IntraMuscular once  levothyroxine Injectable 40 MICROGram(s) IV Push at bedtime  loperamide 4 milliGRAM(s) Oral every 6 hours  metoprolol tartrate Injectable 5 milliGRAM(s) IV Push every 6 hours  Omegaven 10 g/100 mL 50 Gram(s) 50 Gram(s) (41.67 mL/Hr) IV Continuous <Continuous>  opium Tincture 6 milliGRAM(s) Oral every 6 hours  Parenteral Nutrition - Adult 1 Each TPN Continuous <Continuous>  Parenteral Nutrition - Adult 1 Each TPN Continuous <Continuous>  sodium chloride 3%  Inhalation 4 milliLiter(s) Inhalation once  sodium chloride 7% Inhalation 4 milliLiter(s) Inhalation once  ursodiol Capsule 300 milliGRAM(s) Oral every 8 hours  venlafaxine XR. 150 milliGRAM(s) Oral two times a day    MEDICATIONS  (PRN):  ondansetron Injectable 4 milliGRAM(s) IV Push every 8 hours PRN Nausea and/or Vomiting      ALLERGIES:  Allergies    penicillin (Angioedema)    Intolerances        LABS:                        8.1    11.53 )-----------( 158      ( 15 Nov 2023 05:30 )             26.7     11-15    138  |  102  |  29<H>  ----------------------------<  108<H>  3.8   |  28  |  0.97    Ca    9.0      15 Nov 2023 05:30  Phos  3.3     11-15  Mg     2.0     11-15    TPro  8.2  /  Alb  2.8<L>  /  TBili  6.1<H>  /  DBili  3.9<H>  /  AST  82<H>  /  ALT  64<H>  /  AlkPhos  128<H>  11-15      Urinalysis Basic - ( 15 Nov 2023 05:30 )    Color: x / Appearance: x / SG: x / pH: x  Gluc: 108 mg/dL / Ketone: x  / Bili: x / Urobili: x   Blood: x / Protein: x / Nitrite: x   Leuk Esterase: x / RBC: x / WBC x   Sq Epi: x / Non Sq Epi: x / Bacteria: x      CAPILLARY BLOOD GLUCOSE            RADIOLOGY & ADDITIONAL TESTS: Reviewed.    ASSESSMENT:    PLAN:      Hepatology Consult Progress Note:     Hepatology re-consulted as patient's liver enzymes and bilirubin initially down-trended on ursodiol, but are now again up-trending.     OVERNIGHT EVENTS: AMRITA.    SUBJECTIVE / INTERVAL HPI:   Patient seen and examined at bedside. States that overall he feels well. Sitting up in a chair and has no acute complaints at this time. Says that his perc cony drainage bag continues to have a large amount of out-put.       VITAL SIGNS:  Vital Signs Last 24 Hrs  T(C): 37 (15 Nov 2023 09:06), Max: 37 (15 Nov 2023 09:06)  T(F): 98.6 (15 Nov 2023 09:06), Max: 98.6 (15 Nov 2023 09:06)  HR: 94 (15 Nov 2023 09:15) (92 - 96)  BP: 136/66 (15 Nov 2023 09:15) (120/57 - 160/90)  BP(mean): 95 (15 Nov 2023 09:15) (82 - 116)  RR: 26 (15 Nov 2023 09:15) (17 - 26)  SpO2: 96% (15 Nov 2023 09:15) (94% - 97%)    Parameters below as of 15 Nov 2023 09:15  Patient On (Oxygen Delivery Method): nasal cannula  O2 Flow (L/min): 2      11-14-23 @ 07:01  -  11-15-23 @ 07:00  --------------------------------------------------------  IN: 3260.4 mL / OUT: 4915 mL / NET: -1654.6 mL    11-15-23 @ 07:01  -  11-15-23 @ 12:50  --------------------------------------------------------  IN: 405 mL / OUT: 250 mL / NET: 155 mL      PHYSICAL EXAM:  General: No acute distress  Lungs: Normal respiratory effort and no intercostal retractions  Cardiovascular: RRR  Abdomen: Soft, non-tender, non-distended, +gallbladder perc cony drain on R, +ostomy bag on right, +fistula bag on left with brown contents   Neurological: Alert and oriented x3  Skin: Warm and dry. No obvious rash      MEDICATIONS:  MEDICATIONS  (STANDING):  chlorhexidine 0.12% Liquid 15 milliLiter(s) Swish and Spit two times a day  chlorhexidine 2% Cloths 1 Application(s) Topical <User Schedule>  cholestyramine Powder (Sugar-Free) 4 Gram(s) Oral daily  diphenoxylate/atropine 2 Tablet(s) Oral every 6 hours  enoxaparin Injectable 100 milliGRAM(s) SubCutaneous every 12 hours  epoetin matt (EPOGEN) Injectable 90550 Unit(s) SubCutaneous every 7 days  glucagon  Injectable 1 milliGRAM(s) IntraMuscular once  influenza  Vaccine (HIGH DOSE) 0.7 milliLiter(s) IntraMuscular once  levothyroxine Injectable 40 MICROGram(s) IV Push at bedtime  loperamide 4 milliGRAM(s) Oral every 6 hours  metoprolol tartrate Injectable 5 milliGRAM(s) IV Push every 6 hours  Omegaven 10 g/100 mL 50 Gram(s) 50 Gram(s) (41.67 mL/Hr) IV Continuous <Continuous>  opium Tincture 6 milliGRAM(s) Oral every 6 hours  Parenteral Nutrition - Adult 1 Each TPN Continuous <Continuous>  Parenteral Nutrition - Adult 1 Each TPN Continuous <Continuous>  sodium chloride 3%  Inhalation 4 milliLiter(s) Inhalation once  sodium chloride 7% Inhalation 4 milliLiter(s) Inhalation once  ursodiol Capsule 300 milliGRAM(s) Oral every 8 hours  venlafaxine XR. 150 milliGRAM(s) Oral two times a day    MEDICATIONS  (PRN):  ondansetron Injectable 4 milliGRAM(s) IV Push every 8 hours PRN Nausea and/or Vomiting      ALLERGIES:  Allergies    penicillin (Angioedema)    Intolerances        LABS:                        8.1    11.53 )-----------( 158      ( 15 Nov 2023 05:30 )             26.7     11-15    138  |  102  |  29<H>  ----------------------------<  108<H>  3.8   |  28  |  0.97    Ca    9.0      15 Nov 2023 05:30  Phos  3.3     11-15  Mg     2.0     11-15    TPro  8.2  /  Alb  2.8<L>  /  TBili  6.1<H>  /  DBili  3.9<H>  /  AST  82<H>  /  ALT  64<H>  /  AlkPhos  128<H>  11-15      Urinalysis Basic - ( 15 Nov 2023 05:30 )    Color: x / Appearance: x / SG: x / pH: x  Gluc: 108 mg/dL / Ketone: x  / Bili: x / Urobili: x   Blood: x / Protein: x / Nitrite: x   Leuk Esterase: x / RBC: x / WBC x   Sq Epi: x / Non Sq Epi: x / Bacteria: x      CAPILLARY BLOOD GLUCOSE  RADIOLOGY & ADDITIONAL TESTS: Reviewed.

## 2023-11-15 NOTE — PROGRESS NOTE ADULT - SUBJECTIVE AND OBJECTIVE BOX
78M with history of Crohn's, AFib/Flutter s/p DCCVs on amiodarone, remote ileocectomy and open appendectomy. Admitted (6/23) for SBO vs Crohns flare, s/p NGT decompression and s/p lap TRE converted to open TRE, SBR x 3, left in discontinuity with abthera vac on (6/27), RTOR for ileocolic resection, small bowel anastomosis, and abdominal wall closure on (6/28), c/b fluid collection s/p IR aspiration of perihepatic fluid on (7/3), c/b wound dehiscence s/p RTOR exlap, washout, ileocolic resection with end ileostomy, blow hole colostomy, red rubber from ileostomy to small bowel anastomosis; vicryl bridging mesh on (7/5) transferred to SICU postoperatively for hemodynamic monitoring, with hospital course complicated by periods of AMS most recently on 9/1 and associated with oliguria, severe ELVI and hyponatremia, which resolved but stepped back up for to SICU on 9/10 for acute AMS, intermittent hypoglycemia, AFib with RVR. Percutaneous Cholecystostomy placed on 9/11 for enlarged GB, PCT capped 10/5, and connected to drainage bag 10/25.    Percutaneous cholecystostomy tube with 775 cc output past 24 hr, previously 795 cc/24h. Flushed easily with 3 cc NS. Dressings CDI.    -Strict I/O, monitor output  -IR will continue to follow

## 2023-11-15 NOTE — PROGRESS NOTE ADULT - SUBJECTIVE AND OBJECTIVE BOX
ON: Patient with SOB and wheezing overnight, improved with Duoneb, Ativan given. One of the PICC line ports occluded.       SUBJECTIVE: Reports feeling better after Lasix and Duoneb     MEDICATIONS  (STANDING):  cefepime   IVPB 2000 milliGRAM(s) IV Intermittent every 8 hours  chlorhexidine 0.12% Liquid 15 milliLiter(s) Swish and Spit two times a day  chlorhexidine 2% Cloths 1 Application(s) Topical <User Schedule>  cholestyramine Powder (Sugar-Free) 4 Gram(s) Oral daily  diphenoxylate/atropine 2 Tablet(s) Oral every 6 hours  enoxaparin Injectable 100 milliGRAM(s) SubCutaneous every 12 hours  epoetin matt (EPOGEN) Injectable 63600 Unit(s) SubCutaneous every 7 days  glucagon  Injectable 1 milliGRAM(s) IntraMuscular once  influenza  Vaccine (HIGH DOSE) 0.7 milliLiter(s) IntraMuscular once  levothyroxine Injectable 40 MICROGram(s) IV Push at bedtime  loperamide 4 milliGRAM(s) Oral every 6 hours  metoprolol tartrate Injectable 5 milliGRAM(s) IV Push every 6 hours  Omegaven 10 g/100 mL 50 Gram(s) 50 Gram(s) (41.67 mL/Hr) IV Continuous <Continuous>  opium Tincture 6 milliGRAM(s) Oral every 6 hours  Parenteral Nutrition - Adult 1 Each TPN Continuous <Continuous>  Parenteral Nutrition - Adult 1 Each TPN Continuous <Continuous>  sodium chloride 3%  Inhalation 4 milliLiter(s) Inhalation once  ursodiol Suspension 300 milliGRAM(s) Oral every 8 hours  venlafaxine XR. 150 milliGRAM(s) Oral two times a day    MEDICATIONS  (PRN):  LORazepam   Injectable 0.5 milliGRAM(s) IV Push at bedtime PRN Anxiety  ondansetron Injectable 4 milliGRAM(s) IV Push every 8 hours PRN Nausea and/or Vomiting      Drips:     ICU Vital Signs Last 24 Hrs  T(C): 37 (15 Nov 2023 09:06), Max: 37 (15 Nov 2023 09:06)  T(F): 98.6 (15 Nov 2023 09:06), Max: 98.6 (15 Nov 2023 09:06)  HR: 97 (15 Nov 2023 16:57) (92 - 97)  BP: 127/58 (15 Nov 2023 16:57) (127/58 - 149/70)  BP(mean): 84 (15 Nov 2023 16:57) (84 - 101)  ABP: --  ABP(mean): --  RR: 27 (15 Nov 2023 16:57) (18 - 27)  SpO2: 100% (15 Nov 2023 16:57) (94% - 100%)    O2 Parameters below as of 15 Nov 2023 16:57  Patient On (Oxygen Delivery Method): room air              Physical Exam:  General: NAD, resting comfortably in bed  Pulmonary: Nonlabored breathing, no respiratory distress  Cardiovascular: NSR  Abdominal: soft, NT/ND, wound manager in place, ostomy in place, perc cony bilious  Extremities: WWP, normal strength  Neuro: A/O x 3, CNs II-XII grossly intact, no focal deficits        I&O's Summary    14 Nov 2023 07:01  -  15 Nov 2023 07:00  --------------------------------------------------------  IN: 3260.4 mL / OUT: 4915 mL / NET: -1654.6 mL    15 Nov 2023 07:01  -  15 Nov 2023 18:08  --------------------------------------------------------  IN: 1020 mL / OUT: 1550 mL / NET: -530 mL        LABS:                        8.1    11.53 )-----------( 158      ( 15 Nov 2023 05:30 )             26.7     11-15    138  |  102  |  29<H>  ----------------------------<  108<H>  3.8   |  28  |  0.97    Ca    9.0      15 Nov 2023 05:30  Phos  3.3     11-15  Mg     2.0     11-15    TPro  8.2  /  Alb  2.8<L>  /  TBili  6.1<H>  /  DBili  3.9<H>  /  AST  82<H>  /  ALT  64<H>  /  AlkPhos  128<H>  11-15      Urinalysis Basic - ( 15 Nov 2023 05:30 )    Color: x / Appearance: x / SG: x / pH: x  Gluc: 108 mg/dL / Ketone: x  / Bili: x / Urobili: x   Blood: x / Protein: x / Nitrite: x   Leuk Esterase: x / RBC: x / WBC x   Sq Epi: x / Non Sq Epi: x / Bacteria: x      CAPILLARY BLOOD GLUCOSE        LIVER FUNCTIONS - ( 15 Nov 2023 05:30 )  Alb: 2.8 g/dL / Pro: 8.2 g/dL / ALK PHOS: 128 U/L / ALT: 64 U/L / AST: 82 U/L / GGT: x             Cultures:    RADIOLOGY & ADDITIONAL STUDIES:

## 2023-11-15 NOTE — PROGRESS NOTE ADULT - SUBJECTIVE AND OBJECTIVE BOX
SUBJECTIVE:   Patient seen and examined at bedside   Nebulizer treatment overnight   Continues to endorse mild SOB this AM  No abdominal complaints or concerns    Voiding spontaneously and adequately   Tolerating diet   Good enteral function        MEDICATIONS  (STANDING):  chlorhexidine 0.12% Liquid 15 milliLiter(s) Swish and Spit two times a day  chlorhexidine 2% Cloths 1 Application(s) Topical <User Schedule>  cholestyramine Powder (Sugar-Free) 4 Gram(s) Oral daily  diphenoxylate/atropine 2 Tablet(s) Oral every 6 hours  enoxaparin Injectable 100 milliGRAM(s) SubCutaneous every 12 hours  epoetin matt (EPOGEN) Injectable 52317 Unit(s) SubCutaneous every 7 days  glucagon  Injectable 1 milliGRAM(s) IntraMuscular once  influenza  Vaccine (HIGH DOSE) 0.7 milliLiter(s) IntraMuscular once  levothyroxine Injectable 40 MICROGram(s) IV Push at bedtime  loperamide 4 milliGRAM(s) Oral every 6 hours  metoprolol tartrate Injectable 5 milliGRAM(s) IV Push every 6 hours  Omegaven 10 g/100 mL 50 Gram(s) 50 Gram(s) (41.67 mL/Hr) IV Continuous <Continuous>  opium Tincture 6 milliGRAM(s) Oral every 6 hours  Parenteral Nutrition - Adult 1 Each TPN Continuous <Continuous>  Parenteral Nutrition - Adult 1 Each TPN Continuous <Continuous>  sodium chloride 3%  Inhalation 4 milliLiter(s) Inhalation once  ursodiol Capsule 300 milliGRAM(s) Oral every 8 hours  venlafaxine XR. 150 milliGRAM(s) Oral two times a day    MEDICATIONS  (PRN):  ondansetron Injectable 4 milliGRAM(s) IV Push every 8 hours PRN Nausea and/or Vomiting      Vital Signs Last 24 Hrs  T(C): 37 (15 Nov 2023 09:06), Max: 37 (15 Nov 2023 09:06)  T(F): 98.6 (15 Nov 2023 09:06), Max: 98.6 (15 Nov 2023 09:06)  HR: 94 (15 Nov 2023 09:15) (92 - 96)  BP: 136/66 (15 Nov 2023 09:15) (120/57 - 160/90)  BP(mean): 95 (15 Nov 2023 09:15) (82 - 116)  RR: 26 (15 Nov 2023 09:15) (17 - 26)  SpO2: 96% (15 Nov 2023 09:15) (94% - 97%)    Parameters below as of 15 Nov 2023 09:15  Patient On (Oxygen Delivery Method): nasal cannula  O2 Flow (L/min): 2      Physical Exam:  General: NAD, resting comfortably in bed  Pulmonary: Nonlabored breathing, no respiratory distress  Cardiovascular: NSR  Abdominal: soft, NT/ND, wound manager in place (875 cc enteric content), ostomy in place (65 cc), perc cony bilious (775 cc)   : Adequate UOP (3200 cc)   Extremities: WWP, normal strength  Neuro: A/O x 3, CNs II-XII grossly intact, no focal deficits      I&O's Summary    14 Nov 2023 07:01  -  15 Nov 2023 07:00  --------------------------------------------------------  IN: 3260.4 mL / OUT: 4915 mL / NET: -1654.6 mL    15 Nov 2023 07:01  -  15 Nov 2023 11:25  --------------------------------------------------------  IN: 405 mL / OUT: 250 mL / NET: 155 mL        LABS:                        8.1    11.53 )-----------( 158      ( 15 Nov 2023 05:30 )             26.7     11-15    138  |  102  |  29<H>  ----------------------------<  108<H>  3.8   |  28  |  0.97    Ca    9.0      15 Nov 2023 05:30  Phos  3.3     11-15  Mg     2.0     11-15    TPro  8.2  /  Alb  2.8<L>  /  TBili  6.1<H>  /  DBili  3.9<H>  /  AST  82<H>  /  ALT  64<H>  /  AlkPhos  128<H>  11-15      Urinalysis Basic - ( 15 Nov 2023 05:30 )    Color: x / Appearance: x / SG: x / pH: x  Gluc: 108 mg/dL / Ketone: x  / Bili: x / Urobili: x   Blood: x / Protein: x / Nitrite: x   Leuk Esterase: x / RBC: x / WBC x   Sq Epi: x / Non Sq Epi: x / Bacteria: x      CAPILLARY BLOOD GLUCOSE        LIVER FUNCTIONS - ( 15 Nov 2023 05:30 )  Alb: 2.8 g/dL / Pro: 8.2 g/dL / ALK PHOS: 128 U/L / ALT: 64 U/L / AST: 82 U/L / GGT: x             RADIOLOGY & ADDITIONAL STUDIES:

## 2023-11-15 NOTE — PROGRESS NOTE ADULT - SUBJECTIVE AND OBJECTIVE BOX
S: No new issues/events overnight, no new med c/o    O: ICU Vital Signs Last 24 Hrs  T(F): 98.6 (11-15-23 @ 09:06), Max: 98.6 (11-15-23 @ 09:06)  HR: 97 (11-15-23 @ 12:34) (92 - 97)  BP: 133/64 (11-15-23 @ 12:34) (120/57 - 149/70)  BP(mean): 91 (11-15-23 @ 12:34) (82 - 101)  ABP: --  RR: 25 (11-15-23 @ 12:34) (17 - 26)  SpO2: 100% (11-15-23 @ 12:34) (94% - 100%)    PHYSICAL EXAM:   Neurological: AAOx3, CNII-XII intact,  strength 5/5 b/l  ENT: mucus membrane moist  Cardiovascular: RRR  Respiratory: CTA  Gastrointestinal: soft, NT, ND, wound manager with grayish output, right sided ostomy bag with scant yellowish drainage.   Extremities: warm, no dependent edema  Vascular: no cyanosis/erythema  Skin: no rashes  MSK: no joint swelling.       LABS:    11-15    138  |  102  |  29<H>  ----------------------------<  108<H>  3.8   |  28  |  0.97    Ca    9.0      15 Nov 2023 05:30  Phos  3.3     11-15  Mg     2.0     11-15    TPro  8.2  /  Alb  2.8<L>  /  TBili  6.1<H>  /  DBili  3.9<H>  /  AST  82<H>  /  ALT  64<H>  /  AlkPhos  128<H>  11-15  LIVER FUNCTIONS - ( 15 Nov 2023 05:30 )  Alb: 2.8 g/dL / Pro: 8.2 g/dL / ALK PHOS: 128 U/L / ALT: 64 U/L / AST: 82 U/L / GGT: x                               8.1    11.53 )-----------( 158      ( 15 Nov 2023 05:30 )             26.7     Urinalysis Basic - ( 15 Nov 2023 05:30 )    Color: x / Appearance: x / SG: x / pH: x  Gluc: 108 mg/dL / Ketone: x  / Bili: x / Urobili: x   Blood: x / Protein: x / Nitrite: x   Leuk Esterase: x / RBC: x / WBC x   Sq Epi: x / Non Sq Epi: x / Bacteria: x    CAPILLARY BLOOD GLUCOSE        MEDICATIONS  (STANDING):  cefepime   IVPB 2000 milliGRAM(s) IV Intermittent every 8 hours  chlorhexidine 0.12% Liquid 15 milliLiter(s) Swish and Spit two times a day  chlorhexidine 2% Cloths 1 Application(s) Topical <User Schedule>  cholestyramine Powder (Sugar-Free) 4 Gram(s) Oral daily  diphenoxylate/atropine 2 Tablet(s) Oral every 6 hours  enoxaparin Injectable 100 milliGRAM(s) SubCutaneous every 12 hours  epoetin matt (EPOGEN) Injectable 10167 Unit(s) SubCutaneous every 7 days  glucagon  Injectable 1 milliGRAM(s) IntraMuscular once  influenza  Vaccine (HIGH DOSE) 0.7 milliLiter(s) IntraMuscular once  levothyroxine Injectable 40 MICROGram(s) IV Push at bedtime  loperamide 4 milliGRAM(s) Oral every 6 hours  metoprolol tartrate Injectable 5 milliGRAM(s) IV Push every 6 hours  Omegaven 10 g/100 mL 50 Gram(s) 50 Gram(s) (41.67 mL/Hr) IV Continuous <Continuous>  opium Tincture 6 milliGRAM(s) Oral every 6 hours  Parenteral Nutrition - Adult 1 Each TPN Continuous <Continuous>  Parenteral Nutrition - Adult 1 Each TPN Continuous <Continuous>  sodium chloride 3%  Inhalation 4 milliLiter(s) Inhalation once  ursodiol Suspension 300 milliGRAM(s) Oral every 8 hours  venlafaxine XR. 150 milliGRAM(s) Oral two times a day    MEDICATIONS  (PRN):  ondansetron Injectable 4 milliGRAM(s) IV Push every 8 hours PRN Nausea and/or Vomiting      Poole:	  [ x] None	[ ] Daily Poole Order Placed	   Indication:	  [ ] Strict I and O's    [ ] Obstruction     [ ] Incontinence + Stage 3 or 4 Decubitus  Central Line:  [ ] None	   [x ]  Medication / TPN Administration     [ ] No Peripheral IV

## 2023-11-15 NOTE — CHART NOTE - NSCHARTNOTEFT_GEN_A_CORE
Infectious Diseases Anti-infective Approval Note    Medication: Cefepime  Dose*: 2g  Route: IV  Frequency: q8h  Duration**: 7 days     *Dose may be adjusted as needed for alterations in renal function.    **Reflects duration of approval and not necessarily duration of therapy     THIS IS NOT AN INFECTIOUS DISEASES CONSULTATION

## 2023-11-15 NOTE — PROGRESS NOTE ADULT - SUBJECTIVE AND OBJECTIVE BOX
Less SOB overnight but now with a cough  Afeb In RR Decreased BS at bases Good BS ant Trace edema WBC increased

## 2023-11-15 NOTE — CHART NOTE - NSCHARTNOTEFT_GEN_A_CORE
Admitting Diagnosis:   Patient is a 78y old  Male who presents with a chief complaint of SBO (2023 10:33)      PAST MEDICAL & SURGICAL HISTORY:  Essential hypertension  Hypertension      Atrial fibrillation  s/p cardioversion  and   Pt. reports 4 DCCV  Now on Amiodarone 200mg PO bid and Eliquis 5mg PO bid  Last DCCV 4 yrs ago at [facility]      Crohn's disease  s/p partial resection of ileum      Hyperlipidemia      Hypothyroidism      History of depression  On Venlafaxine ER 150mg PO bid      Junctional rhythm      H/O knee surgery      History of cataract surgery          Current Nutrition Order:   TPN via PICC- 2.3L bag running over 18hrs (70ml/hr), providing 411g Dex, 144g AA, 80g Omegaven lipids; provides 2773.4 kcals, 144g protein, GIR 3.03 mg/kg/min  PO- Low fat PO diet + 1 LPS BID [provides 200 kcals, 30g protein] + 1 Ensure Max daily [150 kcals, 20g protein]    PO Intake: Good (%) [   ]  Fair (50-75%) [ X  ] Poor (<25%) [   ]    GI Issues:  ileostomy output 65cc x 24hrs, abdominal wound/fistula output 875cc x 24hrs, per cony output 775cc x 24hrs    Pain: no pain/discomfort noted    Skin Integrity: abd wound [10.5 x 6.5cm] and fistula with wound manager in place, RLQ ileostomy, perc cony drain, skin tear below ostomy. wound to R heel resolved. Rudy score 20    Labs:   11-15    138  |  102  |  29<H>  ----------------------------<  108<H>  3.8   |  28  |  0.97    Ca    9.0      15 Nov 2023 05:30  Phos  3.3     11-15  Mg     2.0     -15    TPro  8.2  /  Alb  2.8<L>  /  TBili  6.1<H>  /  DBili  3.9<H>  /  AST  82<H>  /  ALT  64<H>  /  AlkPhos  128<H>  -15    CAPILLARY BLOOD GLUCOSE          Medications:  MEDICATIONS  (STANDING):  chlorhexidine 0.12% Liquid 15 milliLiter(s) Swish and Spit two times a day  chlorhexidine 2% Cloths 1 Application(s) Topical <User Schedule>  cholestyramine Powder (Sugar-Free) 4 Gram(s) Oral daily  diphenoxylate/atropine 2 Tablet(s) Oral every 6 hours  enoxaparin Injectable 100 milliGRAM(s) SubCutaneous every 12 hours  epoetin matt (EPOGEN) Injectable 76123 Unit(s) SubCutaneous every 7 days  glucagon  Injectable 1 milliGRAM(s) IntraMuscular once  influenza  Vaccine (HIGH DOSE) 0.7 milliLiter(s) IntraMuscular once  levothyroxine Injectable 40 MICROGram(s) IV Push at bedtime  loperamide 4 milliGRAM(s) Oral every 6 hours  metoprolol tartrate Injectable 5 milliGRAM(s) IV Push every 6 hours  Omegaven 10 g/100 mL 50 Gram(s) 50 Gram(s) (41.67 mL/Hr) IV Continuous <Continuous>  opium Tincture 6 milliGRAM(s) Oral every 6 hours  Parenteral Nutrition - Adult 1 Each TPN Continuous <Continuous>  Parenteral Nutrition - Adult 1 Each TPN Continuous <Continuous>  sodium chloride 3%  Inhalation 4 milliLiter(s) Inhalation once  ursodiol Capsule 300 milliGRAM(s) Oral every 8 hours  venlafaxine XR. 150 milliGRAM(s) Oral two times a day    MEDICATIONS  (PRN):  ondansetron Injectable 4 milliGRAM(s) IV Push every 8 hours PRN Nausea and/or Vomiting      Daily Weight in k.2 (15 Nov 2023 01:06)  Daily 94.2kg [2023]  Daily 95.8kg [2023]  Daily 94.2kg [2023]  Daily 85.2kg [2023]  Daily 85.2kg [31 Oct 2023]  Daily 85.2kg [24 Oct 2023]  Daily 85.2kg [20 Oct 2023]  Daily 81.9kg [18 Oct 2023]  Daily 85.2kg [16 Oct 2023]  Daily   95.2kg [12 Oct 2023]   Daily 87.7kg [11 Oct 2023]  Daily 87.4kg [09 Oct 2023]  Daily 86.4kg [07 Oct 2023]  Daily 82.5kg [06 Oct 2023]  Daily 82.6kg [4 Oct 2023]   Daily 83.5kg [03 Oct 2023]  Daily 84.5kg [2 Oct 2023]  Daily 87.2kg [1 Oct 2023]  Daily 88.3kg [29 Sep 2023]  Daily 89.9kg [28 Sep 2023]  Daily 87.7kg [24 Sep 2023]  Daily 90.4kg [21 Sep 2023]  Daily 91.4kg [20 Sep 2023]  Daily 86.7kg [18 Sep 2023]  Daily 88.1kg [16 Sep 2023]  Daily 88.9kg [15 Sep 2023]   Daily 89.1 [14 Sep 2023]  Daily 92.9kg [6 Sep 2023]  Daily 91.8kg [27 Aug 2023]  Daily 101kg [9 Aug 2023]  Daily   102.1kg [10 July 2023]  Daily 99.9kg [2023]      Weight Change: weight trending down throughout admission, now appears to be trending back up. Recommend continue weekly/daily weights for trending & ensuring adequacy of nutrition provision    IBW: 86.4kg   % IBW: 109.0%    Estimated energy needs:   Ideal body weight (86.4kg) used for calculations as pt >100% IBW and BMI <30 per [facility] Standards of Care. Needs estimated for age and adjusted for current clinical status, increased needs for post-op & open abd wound healing    Calories: 5591-7575 kcals based on 30-40 kcals/kg  Protein: 129.6-172.8 g protein based on 1.5-2.0g protein/kg + additional depending on outputs  *Fluid needs per team    Subjective:   78 M w/ Crohn's, AFib/Flutter s/p DCCVs on amiodarone, remote ileocectomy and open appendectomy. Admitted () for SBO vs Crohns flare, s/p NGT decompression and s/p lap TRE converted to open TRE, SBR x 3, left in discontinuity with abthera vac on (), RTOR for ileocolic resection, small bowel anastomosis, and abdominal wall closure on (), c/b fluid collection s/p IR aspiration of perihepatic fluid on (7/3), c/b wound dehiscence s/p RTOR exlap, washout, ileocolic resection with end ileostomy, blow hole colostomy, red rubber from ileostomy to small bowel anastomosis; vicryl bridging mesh on () transferred to SICU postoperatively for hemodynamic monitoring, with hospital course complicated by periods of AMS most recently on  and associated with oliguria, severe ELVI and hyponatremia, which resolved but stepped back up for to SICU on 9/10 for acute AMS, intermittent hypoglycemia, AFib with RVR. Percutaneous Cholecystostomy placed on  for enlarged GB, PCT capped 10/5.    Chart reviewed. Pt seen at bedside on  with IDT today during AM rounds, remains on TPN w/ PO diet. PO diet changed to low fat yesterday due to rise in T. bili and direct bili. Noted increase today. To decrease dextrose and lipids in TPN provision today and closely monitor hepatic labs. Discussed with pt & education provided on focusing on high-protein, low fat PO foods. Labs: T. Bili 6.1 <H> [trending back up], alk phos 128 <H> [trending down], AST 82 <H>, ALT 64 <H>.  [11/15]. On - vitamin C 0.4 [borderline low], selenium 102 [now WNL]. Meds: opium, epogen, ursodiol, cholestyramine, synthroid [administer 30-60 minutes before food; separate at least 4hrs from calcium or iron-containing products or bile acid sequestrants], imodium, zofran. TPN reordered for tonight. RDN will continue to monitor, reassess, and intervene as appropriate.     Previous Nutrition Diagnosis: Increased Nutrient Needs R/T physiological demands for nutrient AEB post-op wound healing    Active [ X  ]  Resolved [   ]    If resolved, new PES:     Goal:  Pt will meet at least 75% of protein & energy needs via most appropriate route for nutrition     Recommendations:  1. c/w TPN via PICC over 18hrs- to decrease today to 361g Dex, 144g AA, 50g Omegaven lipids; provides 2303.4 kcals, 144g protein, GIR 2.66 mg/kg/min  - provides 24.5 kcals/kg, 18.3 non-protein kcals/kg, 1.67g protein/kg IBW   - consider cycling TPN over 12-16hrs  - monitor LFTs closely, consider further decreasing lipids to 3x / wk  - MVI, selenium, zinc in TPN bag  - consider increasing vit C in bag  - provide copper in TPN 3x/week [MWF]-- pending repeat levels  - monitor weights & wound healing, adjust substrates as indicated  2. PO diet per team discretion  - current: Low fat PO diet + 1 LPS BID [200 kcals, 30g protein] + 1 Ensure Max daily [150 kcals, 30g protein]  - emphasis on small, high protein meals  - ongoing diet education prn  - hold PO diet as indicated  3. Fluids & lytes per team  - additional bolus prn  4. Weekly lipid panel   - hold/decrease lipids if TG >400  5. Monitor BMP/Mg/Phos, POC BG  - monitor & replenish lytes outside TPN bag prn  6. Continue close monitoring of clinical course & adjust recommendations prn    Education: diet provision    Risk Level: High [  X ] Moderate [   ] Low [   ]

## 2023-11-15 NOTE — PROGRESS NOTE ADULT - ATTENDING COMMENTS
Elevated liver tests, ALP and bilirubin in patient with chronic illness, Crohn's disease, cholecystostomy tube and hx of TPN.   Likely a multifactorial etiology for chloestasis   Will discuss with IR whether a cholangiogram through cholecystostomy tube is feasible   Continue Ursodiol   Nutritional support   Will follow

## 2023-11-16 NOTE — PROGRESS NOTE ADULT - ASSESSMENT
78M w PMH Crohn's, AFib/Flutter s/p DCCVs on amiodarone, remote ileocectomy and open appendectomy. Admitted (6/23) for SBO vs Crohns flare, s/p NGT decompression and s/p lap converted to open TRE, SBR x 3, left in discontinuity with abthera vac on (6/27), RTOR for ileocolic resection, small bowel anastomosis, and abdominal wall closure on (6/28), c/b fluid collection s/p IR aspiration of perihepatic fluid on (7/3), c/b wound dehiscence s/p RTOR exlap, washout, ileocolic resection with end ileostomy, blow hole colostomy, red rubber from ileostomy to small bowel anastomosis; vicryl bridging mesh on (7/5) transferred to SICU postoperatively for hemodynamic monitoring, with hospital course complicated by periods of severe ELVI and hyponatremia, which resolved but stepped back up for to SICU on (9/10) for acute AMS, intermittent hypoglycemia, AFib with RVR. Percutaneous Cholecystostomy placed on (9/11) for enlarged GB, PCT capped 10/5 and uncapped 10/25 for hyperbilirubinemia--improving after changes to TPN made. Right brachial DVT, left basillic and cephalic superficial thrombus on duplex 11/2, on Lovenox 100 BID.     NEURO: Hospital course with AMS (resolved), EEG neg. Hx of depression - cont Effexor. Insomnia: Melatonin PRN. Nausea: Zofran PRN. Anxiety: Lorazepam PRN.   HEENT: Cepacol prn  CV: Hx Afib/flutter: s/p DCCV,  Amio held for Increased LFT'S. Continue Metoprolol 5q6. Hx HLD: Lipitor held given high LFTs. TTE  (7/18) - PASP 64mmHg, EF 65-70%,  Hx HTN -  holding Norvasc 10qd. Holding Losartan. Cont Lopressor 5 q6. s/p Albumin 25% q8 x3 days. Repeat echo pending  PULM: mild SOB, no sig resp distress on room air. Chest CT no PE, has some pleural effusion R>L, and left upper patchy ground glass opacities -- ??micro-aspiration??, POCUS not much B lines, has bilateral effusion, but not too impressive. COVID negative. RVP, MRSA surveillance swab negative. empiric cefepime for now. trial of lasix   GI/FEN: High protein diet. Cont Ensure max x1/day. Cont TPN/ lipids - Continue Omegavan to reduce cholecystasis:  (2.3 L/day volume) for high ostomy and ECF output: Cont Octreotide in TPN, Imodium, Lomotil, Tincture of Opium, Cholestyramine 10/18. Transaminitis of unknown origin, with downtrending LFTs, stable Tbili. s/p Perc Choley on 9/11; MRCP with no obstruction, done as per Hepatology started on ursidiol, cont PPI. monitor drainage from fistula, bolus as needed to keep I&O even. GI following, considering Gattex. Tbili back up to 6.2 - reconsulted Hepatology. lowered Carbs and fat in TPN/lipids.,   : Voids.   ENDO: Hx hypothyroid: IV Synthroid, TSH wnl on 10/23  ID: chest CT with ALDEN with ground glass opacities, afebrile and unimpressive leukocytosis, unclear etiology. will give empiric cefepime for now. (11/15--) Bld Cx's NGTD. Cont Nystatin powder // PREVIOUSLY had C. tertium, Lactobacillis from his IR cx 7/3, and candida albicans, lactobacillus, vanc sensitive E faecium, vanc resistant E gallinarum, vanc resistant E casseliflavus, lactobacillus from his OR cx 7/5. Completed course of abx with Imipenem (9/10-9/15). Imipenem (8/26--) imipenem (6/30-7/12, 7/23-7/30), Dapto (6/30-7/5 and 7/23-7/24). Empiric dapto (8/23-24) and cefepime (8/23-24).   PPX: SCDs, Therapeutic lovenox due to R brachial vein DVT (11/3 - )  HEME: continue Iron Sucrose 200 qd x 3 days for chronic anemia.  LINES: PIVs, RUE PICC: (11/1-) DC'd: LUE PICC (9/1-11/1 )  WOUNDS/DRAINS: Abdominal wound and fistula with wound manager in place dressing change T & F. RLQ Ostomy. IR perc cony. // DC: L Clay drain.  PT: Ambulate as tolerated   DISPO: SICU due to complicated hospital course. but will do vitals q4hrs (hold overnight), and CBC qod/BMP QD in setting of high fistula output.

## 2023-11-16 NOTE — PROGRESS NOTE ADULT - SUBJECTIVE AND OBJECTIVE BOX
SUBJECTIVE:   Patient seen and examined at bedside; resting comfortably   No major overnight events   Weaned off NC. Endorsing mild SOB but no significant respiratory distress in room air   Denied abdominal pain, nausea or emesis   Voiding spontaneously       MEDICATIONS  (STANDING):  cefepime   IVPB 2000 milliGRAM(s) IV Intermittent every 8 hours  chlorhexidine 0.12% Liquid 15 milliLiter(s) Swish and Spit two times a day  chlorhexidine 2% Cloths 1 Application(s) Topical <User Schedule>  cholestyramine Powder (Sugar-Free) 4 Gram(s) Oral daily  diphenoxylate/atropine 2 Tablet(s) Oral every 6 hours  enoxaparin Injectable 100 milliGRAM(s) SubCutaneous every 12 hours  epoetin matt (EPOGEN) Injectable 02117 Unit(s) SubCutaneous every 7 days  glucagon  Injectable 1 milliGRAM(s) IntraMuscular once  influenza  Vaccine (HIGH DOSE) 0.7 milliLiter(s) IntraMuscular once  levothyroxine Injectable 40 MICROGram(s) IV Push at bedtime  loperamide 4 milliGRAM(s) Oral every 6 hours  metoprolol tartrate Injectable 5 milliGRAM(s) IV Push every 6 hours  Omegaven 10 g/100 mL 50 Gram(s) 50 Gram(s) (41.67 mL/Hr) IV Continuous <Continuous>  opium Tincture 6 milliGRAM(s) Oral every 6 hours  Parenteral Nutrition - Adult 1 Each TPN Continuous <Continuous>  sodium chloride 3%  Inhalation 4 milliLiter(s) Inhalation once  ursodiol Suspension 300 milliGRAM(s) Oral every 8 hours  venlafaxine XR. 150 milliGRAM(s) Oral two times a day    MEDICATIONS  (PRN):  LORazepam   Injectable 0.5 milliGRAM(s) IV Push at bedtime PRN Anxiety  ondansetron Injectable 4 milliGRAM(s) IV Push every 8 hours PRN Nausea and/or Vomiting      Vital Signs Last 24 Hrs  T(C): 36.6 (16 Nov 2023 05:09), Max: 37 (15 Nov 2023 09:06)  T(F): 97.8 (16 Nov 2023 05:09), Max: 98.6 (15 Nov 2023 09:06)  HR: 95 (16 Nov 2023 06:34) (94 - 97)  BP: 140/66 (16 Nov 2023 06:34) (127/58 - 155/68)  BP(mean): 95 (16 Nov 2023 06:34) (84 - 104)  RR: 20 (16 Nov 2023 06:34) (18 - 27)  SpO2: 96% (16 Nov 2023 06:34) (94% - 100%)    Parameters below as of 16 Nov 2023 06:34  Patient On (Oxygen Delivery Method): room air        Physical Exam:  General: NAD, resting comfortably in bed  Pulmonary: Nonlabored breathing, no respiratory distress  Cardiovascular: NSR  Abdominal: soft, NT/ND, wound manager in place (1710 cc enteric content), ostomy in place (80 cc), perc cony bilious (775 cc)   : Adequate UOP (3200 cc)   Extremities: WWP, normal strength  Neuro: A/O x 3, CNs II-XII grossly intact, no focal deficits      I&O's Summary    15 Nov 2023 07:01  -  16 Nov 2023 07:00  --------------------------------------------------------  IN: 4318.6 mL / OUT: 4065 mL / NET: 253.6 mL        LABS:                        8.1    11.53 )-----------( 158      ( 15 Nov 2023 05:30 )             26.7     11-15    138  |  102  |  29<H>  ----------------------------<  108<H>  3.8   |  28  |  0.97    Ca    9.0      15 Nov 2023 05:30  Phos  3.3     11-15  Mg     2.0     11-15    TPro  8.2  /  Alb  2.8<L>  /  TBili  6.1<H>  /  DBili  3.9<H>  /  AST  82<H>  /  ALT  64<H>  /  AlkPhos  128<H>  11-15      Urinalysis Basic - ( 15 Nov 2023 05:30 )    Color: x / Appearance: x / SG: x / pH: x  Gluc: 108 mg/dL / Ketone: x  / Bili: x / Urobili: x   Blood: x / Protein: x / Nitrite: x   Leuk Esterase: x / RBC: x / WBC x   Sq Epi: x / Non Sq Epi: x / Bacteria: x      CAPILLARY BLOOD GLUCOSE        LIVER FUNCTIONS - ( 15 Nov 2023 05:30 )  Alb: 2.8 g/dL / Pro: 8.2 g/dL / ALK PHOS: 128 U/L / ALT: 64 U/L / AST: 82 U/L / GGT: x             RADIOLOGY & ADDITIONAL STUDIES:

## 2023-11-16 NOTE — PROGRESS NOTE ADULT - SUBJECTIVE AND OBJECTIVE BOX
ON: RVP negative, MRSA swab negative, Blood culture sent 11/15 no growth so far.       SUBJECTIVE: reports shortness of breath when leaning down, improves with sitting up    MEDICATIONS  (STANDING):  cefepime   IVPB 2000 milliGRAM(s) IV Intermittent every 8 hours  chlorhexidine 0.12% Liquid 15 milliLiter(s) Swish and Spit two times a day  chlorhexidine 2% Cloths 1 Application(s) Topical <User Schedule>  cholestyramine Powder (Sugar-Free) 4 Gram(s) Oral daily  diphenoxylate/atropine 2 Tablet(s) Oral every 6 hours  enoxaparin Injectable 100 milliGRAM(s) SubCutaneous every 12 hours  epoetin matt (EPOGEN) Injectable 01805 Unit(s) SubCutaneous every 7 days  glucagon  Injectable 1 milliGRAM(s) IntraMuscular once  influenza  Vaccine (HIGH DOSE) 0.7 milliLiter(s) IntraMuscular once  levothyroxine Injectable 40 MICROGram(s) IV Push at bedtime  loperamide 4 milliGRAM(s) Oral every 6 hours  metoprolol tartrate Injectable 5 milliGRAM(s) IV Push every 6 hours  Omegaven 10G/100ML 50 Gram(s) 50 Gram(s) (41.67 mL/Hr) IV Continuous <Continuous>  opium Tincture 6 milliGRAM(s) Oral every 6 hours  Parenteral Nutrition - Adult 1 Each TPN Continuous <Continuous>  Parenteral Nutrition - Adult 1 Each TPN Continuous <Continuous>  sodium chloride 3%  Inhalation 4 milliLiter(s) Inhalation once  ursodiol Suspension 300 milliGRAM(s) Oral every 8 hours  venlafaxine XR. 150 milliGRAM(s) Oral two times a day    MEDICATIONS  (PRN):  LORazepam   Injectable 0.5 milliGRAM(s) IV Push at bedtime PRN Anxiety  ondansetron Injectable 4 milliGRAM(s) IV Push every 8 hours PRN Nausea and/or Vomiting      Drips:     ICU Vital Signs Last 24 Hrs  T(C): 37.2 (16 Nov 2023 08:49), Max: 37.2 (16 Nov 2023 08:49)  T(F): 98.9 (16 Nov 2023 08:49), Max: 98.9 (16 Nov 2023 08:49)  HR: 94 (16 Nov 2023 09:00) (94 - 97)  BP: 155/71 (16 Nov 2023 09:00) (127/58 - 155/71)  BP(mean): 102 (16 Nov 2023 09:00) (84 - 104)  ABP: --  ABP(mean): --  RR: 19 (16 Nov 2023 09:00) (19 - 29)  SpO2: 96% (16 Nov 2023 09:00) (94% - 100%)    O2 Parameters below as of 16 Nov 2023 09:00  Patient On (Oxygen Delivery Method): nasal cannula  O2 Flow (L/min): 2        Physical Exam:  General: NAD, resting comfortably in bed  Pulmonary: Nonlabored breathing, no respiratory distress  Cardiovascular: NSR  Abdominal: soft, NT/ND, wound manager in place, ostomy in place, perc cony bilious  Extremities: WWP, normal strength  Neuro: A/O x 3, CNs II-XII grossly intact, no focal deficits        I&O's Summary    15 Nov 2023 07:01  -  16 Nov 2023 07:00  --------------------------------------------------------  IN: 4318.6 mL / OUT: 4065 mL / NET: 253.6 mL    16 Nov 2023 07:01  -  16 Nov 2023 12:52  --------------------------------------------------------  IN: 765 mL / OUT: 955 mL / NET: -190 mL        LABS:                        8.1    11.53 )-----------( 158      ( 15 Nov 2023 05:30 )             26.7     11-16    134<L>  |  100  |  36<H>  ----------------------------<  103<H>  4.1   |  27  |  0.92    Ca    8.9      16 Nov 2023 10:51  Phos  3.3     11-15  Mg     2.0     11-15    TPro  8.0  /  Alb  2.5<L>  /  TBili  6.2<H>  /  DBili  4.3<H>  /  AST  89<H>  /  ALT  66<H>  /  AlkPhos  117  11-16      Urinalysis Basic - ( 16 Nov 2023 10:51 )    Color: x / Appearance: x / SG: x / pH: x  Gluc: 103 mg/dL / Ketone: x  / Bili: x / Urobili: x   Blood: x / Protein: x / Nitrite: x   Leuk Esterase: x / RBC: x / WBC x   Sq Epi: x / Non Sq Epi: x / Bacteria: x      CAPILLARY BLOOD GLUCOSE        LIVER FUNCTIONS - ( 16 Nov 2023 10:51 )  Alb: 2.5 g/dL / Pro: 8.0 g/dL / ALK PHOS: 117 U/L / ALT: 66 U/L / AST: 89 U/L / GGT: x

## 2023-11-16 NOTE — PROGRESS NOTE ADULT - SUBJECTIVE AND OBJECTIVE BOX
S: pt requesting lasix.     O: ICU Vital Signs Last 24 Hrs  T(F): 98.9 (11-16-23 @ 08:49), Max: 98.9 (11-16-23 @ 08:49)  HR: 96 (11-16-23 @ 11:15) (94 - 97)  BP: 166/78 (11-16-23 @ 11:15) (127/58 - 166/78)  BP(mean): 110 (11-16-23 @ 11:15) (84 - 110)  ABP: --  RR: 21 (11-16-23 @ 11:15) (19 - 29)  SpO2: 94% (11-16-23 @ 11:15) (94% - 100%)    PHYSICAL EXAM:   Neurological: AAOx3, CNII-XII intact,  strength 5/5 b/l  ENT: mucus membrane moist  Cardiovascular: RRR  Respiratory: CTA  Gastrointestinal: soft, NT, ND, wound manager with grayish output, right sided ostomy bag with scant yellowish drainage.   Extremities: warm, no dependent edema  Vascular: no cyanosis/erythema  Skin: no rashes  MSK: no joint swelling.       LABS:    11-16    134<L>  |  100  |  36<H>  ----------------------------<  103<H>  4.1   |  27  |  0.92    Ca    8.9      16 Nov 2023 10:51  Phos  3.3     11-15  Mg     2.0     11-15    TPro  8.0  /  Alb  2.5<L>  /  TBili  6.2<H>  /  DBili  4.3<H>  /  AST  89<H>  /  ALT  66<H>  /  AlkPhos  117  11-16  LIVER FUNCTIONS - ( 16 Nov 2023 10:51 )  Alb: 2.5 g/dL / Pro: 8.0 g/dL / ALK PHOS: 117 U/L / ALT: 66 U/L / AST: 89 U/L / GGT: x                               8.1    11.53 )-----------( 158      ( 15 Nov 2023 05:30 )             26.7     Urinalysis Basic - ( 16 Nov 2023 10:51 )    Color: x / Appearance: x / SG: x / pH: x  Gluc: 103 mg/dL / Ketone: x  / Bili: x / Urobili: x   Blood: x / Protein: x / Nitrite: x   Leuk Esterase: x / RBC: x / WBC x   Sq Epi: x / Non Sq Epi: x / Bacteria: x    CAPILLARY BLOOD GLUCOSE        MEDICATIONS  (STANDING):  cefepime   IVPB 2000 milliGRAM(s) IV Intermittent every 8 hours  chlorhexidine 0.12% Liquid 15 milliLiter(s) Swish and Spit two times a day  chlorhexidine 2% Cloths 1 Application(s) Topical <User Schedule>  cholestyramine Powder (Sugar-Free) 4 Gram(s) Oral daily  diphenoxylate/atropine 2 Tablet(s) Oral every 6 hours  enoxaparin Injectable 100 milliGRAM(s) SubCutaneous every 12 hours  epoetin matt (EPOGEN) Injectable 03536 Unit(s) SubCutaneous every 7 days  glucagon  Injectable 1 milliGRAM(s) IntraMuscular once  influenza  Vaccine (HIGH DOSE) 0.7 milliLiter(s) IntraMuscular once  levothyroxine Injectable 40 MICROGram(s) IV Push at bedtime  loperamide 4 milliGRAM(s) Oral every 6 hours  metoprolol tartrate Injectable 5 milliGRAM(s) IV Push every 6 hours  Omegaven 10G/100ML 50 Gram(s) 50 Gram(s) (41.67 mL/Hr) IV Continuous <Continuous>  opium Tincture 6 milliGRAM(s) Oral every 6 hours  Parenteral Nutrition - Adult 1 Each TPN Continuous <Continuous>  Parenteral Nutrition - Adult 1 Each TPN Continuous <Continuous>  sodium chloride 3%  Inhalation 4 milliLiter(s) Inhalation once  ursodiol Suspension 300 milliGRAM(s) Oral every 8 hours  venlafaxine XR. 150 milliGRAM(s) Oral two times a day    MEDICATIONS  (PRN):  LORazepam   Injectable 0.5 milliGRAM(s) IV Push at bedtime PRN Anxiety  ondansetron Injectable 4 milliGRAM(s) IV Push every 8 hours PRN Nausea and/or Vomiting      Poole:	  [ x] None	[ ] Daily Poole Order Placed	   Indication:	  [ ] Strict I and O's    [ ] Obstruction     [ ] Incontinence + Stage 3 or 4 Decubitus  Central Line:  [ ] None	   [ x]  Medication / TPN Administration     [ ] No Peripheral IV

## 2023-11-16 NOTE — PROGRESS NOTE ADULT - SUBJECTIVE AND OBJECTIVE BOX
Notes slight SOB again No cough using spirometer Afeb VS stable Decreased BS at bases Clear ant Trace edema

## 2023-11-16 NOTE — PROGRESS NOTE ADULT - ASSESSMENT
78 M w/ Crohn's, AFib/Flutter s/p DCCVs on amiodarone, remote ileocectomy and open appendectomy. Admitted (6/23) for SBO vs Crohns flare, s/p NGT decompression and s/p lap TRE converted to open TRE, SBR x 3, left in discontinuity with abthera vac on (6/27), RTOR for ileocolic resection, small bowel anastomosis, and abdominal wall closure on (6/28), c/b fluid collection s/p IR aspiration of perihepatic fluid on (7/3), c/b wound dehiscence s/p RTOR exlap, washout, ileocolic resection with end ileostomy, blow hole colostomy, red rubber from ileostomy to small bowel anastomosis; vicryl bridging mesh on (7/5) transferred to SICU postoperatively for hemodynamic monitoring, with hospital course complicated by periods of AMS most recently on 9/1 and associated with oliguria, severe ELVI and hyponatremia, which resolved but stepped back up for to SICU on 9/10 for acute AMS, intermittent hypoglycemia, AFib with RVR. Percutaneous Cholecystostomy placed on 9/11 for enlarged GB, PCT capped 10/5. 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 Left Lcay drain removed 11/7. Tolerating regular diet. Wound manager with stable output, complaining of shortness of breath, CT 11/13 showed bilateral mild-to-moderate pleural effusion, ground class opacities c/f atypical PNA, but no evidence of PE. COVID and Flu negative. Nasal swab pos for MSSA. Worsening direct > mixed hyperbilirubinemia      Plan   - SOB, c/f PNA: aggressive IS/chest PT, empiric Cefepime   - Worsening hyperbilirubinemia: agree with Hepatology reconsult and possible ERCP-sphincterotomy as appropriate   - Low fat diet/TPN   - Appreciate wound care team care   - Appreciate SICU care   - Team 4c following     D/w chief resident and attending

## 2023-11-16 NOTE — PROGRESS NOTE ADULT - ASSESSMENT
78 M w/ Crohn's, AFib/Flutter s/p DCCVs on amiodarone, remote ileocectomy and open appendectomy. Admitted (6/23) for SBO vs Crohns flare, s/p NGT decompression and s/p lap TRE converted to open TRE, SBR x 3, left in discontinuity with abthera vac on (6/27), RTOR for ileocolic resection, small bowel anastomosis, and abdominal wall closure on (6/28), c/b fluid collection s/p IR aspiration of perihepatic fluid on (7/3), c/b wound dehiscence s/p RTOR exlap, washout, ileocolic resection with end ileostomy, blow hole colostomy, red rubber from ileostomy to small bowel anastomosis; vicryl bridging mesh on (7/5) transferred to SICU postoperatively for hemodynamic monitoring, with hospital course complicated by periods of AMS most recently on 9/1 and associated with oliguria, severe ELVI and hyponatremia, which resolved but stepped back up for to SICU on 9/10 for acute AMS, intermittent hypoglycemia, AFib with RVR. Percutaneous Cholecystostomy placed on 9/11 for enlarged GB, PCT capped 10/5. 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 Left Clay drain removed 11/7. Tolerating regular diet. Wound manager with stable output, complaining of shortness of breath, CT negative for PE, bilateral trace pleural effusion. SOB improving with Duoneb Elevated WBC and persistent elevated LFT this AM.    Plan   - Trend LFT   - Follow nutrition recs   - Rest of care as per SICU   - Team 5 will follow

## 2023-11-16 NOTE — PROGRESS NOTE ADULT - SUBJECTIVE AND OBJECTIVE BOX
Still with some shortness of breath  Outputs somewhat increased  Vital signs stable  Afebrile  Wounds stable  Electrolytes stable  LFTs stable    Will discuss with other consultants  Out of bed  Pulmonary toilet  Continue antibiotics  ?  Diuresis

## 2023-11-17 NOTE — PROGRESS NOTE ADULT - SUBJECTIVE AND OBJECTIVE BOX
INTERVAL/OVERNIGHT EVENTS:    SUBJECTIVE:     POD #  SICU Day #    Neurologic Medications  LORazepam   Injectable 0.5 milliGRAM(s) IV Push at bedtime PRN Anxiety  ondansetron Injectable 4 milliGRAM(s) IV Push every 8 hours PRN Nausea and/or Vomiting  opium Tincture 6 milliGRAM(s) Oral every 6 hours  venlafaxine XR. 150 milliGRAM(s) Oral two times a day    Respiratory Medications  sodium chloride 3%  Inhalation 4 milliLiter(s) Inhalation once    Cardiovascular Medications  metoprolol tartrate Injectable 5 milliGRAM(s) IV Push every 6 hours    Gastrointestinal Medications  diphenoxylate/atropine 2 Tablet(s) Oral every 6 hours  loperamide 4 milliGRAM(s) Oral every 6 hours  Parenteral Nutrition - Adult 1 Each TPN Continuous <Continuous>  Parenteral Nutrition - Adult 1 Each TPN Continuous <Continuous>  simethicone 80 milliGRAM(s) Chew daily PRN Gas  ursodiol Suspension 300 milliGRAM(s) Oral every 8 hours    Genitourinary Medications    Hematologic/Oncologic Medications  enoxaparin Injectable 100 milliGRAM(s) SubCutaneous every 12 hours  epoetin matt (EPOGEN) Injectable 59471 Unit(s) SubCutaneous every 7 days  influenza  Vaccine (HIGH DOSE) 0.7 milliLiter(s) IntraMuscular once    Antimicrobial/Immunologic Medications  cefepime   IVPB 2000 milliGRAM(s) IV Intermittent every 8 hours    Endocrine/Metabolic Medications  cholestyramine Powder (Sugar-Free) 4 Gram(s) Oral daily  glucagon  Injectable 1 milliGRAM(s) IntraMuscular once  levothyroxine Injectable 40 MICROGram(s) IV Push at bedtime    Topical/Other Medications  chlorhexidine 0.12% Liquid 15 milliLiter(s) Swish and Spit two times a day  chlorhexidine 2% Cloths 1 Application(s) Topical <User Schedule>  fluticasone propionate 50 MICROgram(s)/spray Nasal Spray 1 Spray(s) Both Nostrils two times a day  Omegaven 10G/100ML 50 Gram(s) 50 Gram(s) IV Continuous <Continuous>      MEDICATIONS  (PRN):  LORazepam   Injectable 0.5 milliGRAM(s) IV Push at bedtime PRN Anxiety  ondansetron Injectable 4 milliGRAM(s) IV Push every 8 hours PRN Nausea and/or Vomiting  simethicone 80 milliGRAM(s) Chew daily PRN Gas      I&O's Detail    16 Nov 2023 07:01  -  17 Nov 2023 07:00  --------------------------------------------------------  IN:    freetext medication - Infusion: 458.7 mL    IV PiggyBack: 50 mL    Oral Fluid: 590 mL    TPN (Total Parenteral Nutrition): 2300 mL  Total IN: 3398.7 mL    OUT:    Drain (mL): 95 mL    Drain (mL): 800 mL    Drain (mL): 1400 mL    Voided (mL): 2110 mL  Total OUT: 4405 mL    Total NET: -1006.3 mL      17 Nov 2023 07:01  -  17 Nov 2023 16:25  --------------------------------------------------------  IN:    IV PiggyBack: 100 mL    IV PiggyBack: 50 mL    Oral Fluid: 200 mL    TPN (Total Parenteral Nutrition): 405 mL  Total IN: 755 mL    OUT:    Drain (mL): 100 mL    Drain (mL): 150 mL    Drain (mL): 30 mL    Voided (mL): 200 mL  Total OUT: 480 mL    Total NET: 275 mL          Vital Signs Last 24 Hrs  T(C): 37.1 (17 Nov 2023 12:00), Max: 37.1 (17 Nov 2023 06:20)  T(F): 98.7 (17 Nov 2023 12:00), Max: 98.7 (17 Nov 2023 06:20)  HR: 96 (17 Nov 2023 12:00) (95 - 98)  BP: 138/72 (17 Nov 2023 11:30) (132/60 - 168/75)  BP(mean): 99 (17 Nov 2023 11:30) (87 - 110)  RR: 17 (17 Nov 2023 12:00) (14 - 23)  SpO2: 94% (17 Nov 2023 12:00) (91% - 95%)    Parameters below as of 17 Nov 2023 11:30  Patient On (Oxygen Delivery Method): nasal cannula  O2 Flow (L/min): 2      GENERAL: NAD, resting comfortably in bed  HEENT: NCAT, MMM  C/V: Normal rate, normal peripheral perfusion  PULM: Nonlabored breathing, no respiratory distress,   ABD: Soft, ND, NT, no rebound tenderness, no guarding  EXTREM: WWP, no edema, SCDs in place  NEURO: No focal deficits    LABS:                        8.0    10.42 )-----------( 157      ( 17 Nov 2023 05:30 )             25.7     11-17    137  |  100  |  36<H>  ----------------------------<  126<H>  3.6   |  29  |  0.97    Ca    8.8      17 Nov 2023 05:30  Phos  4.3     11-17  Mg     2.0     11-17    TPro  7.8  /  Alb  2.5<L>  /  TBili  5.7<H>  /  DBili  3.9<H>  /  AST  96<H>  /  ALT  72<H>  /  AlkPhos  129<H>  11-17      Urinalysis Basic - ( 17 Nov 2023 05:30 )    Color: x / Appearance: x / SG: x / pH: x  Gluc: 126 mg/dL / Ketone: x  / Bili: x / Urobili: x   Blood: x / Protein: x / Nitrite: x   Leuk Esterase: x / RBC: x / WBC x   Sq Epi: x / Non Sq Epi: x / Bacteria: x        RADIOLOGY & ADDITIONAL STUDIES:

## 2023-11-17 NOTE — PROGRESS NOTE ADULT - SUBJECTIVE AND OBJECTIVE BOX
Pt   Slept  Throughout  The  Night  Without periods  Of  Restlessness observed  SUBJECTIVE:   Patient seen and examined at bedside this AM   Breathing subjectively better after Lasix. No fever/chills   No abdominal complaints  Enteric contents per ostomy   Clear and adequate urine output       MEDICATIONS  (STANDING):  cefepime   IVPB 2000 milliGRAM(s) IV Intermittent every 8 hours  chlorhexidine 0.12% Liquid 15 milliLiter(s) Swish and Spit two times a day  chlorhexidine 2% Cloths 1 Application(s) Topical <User Schedule>  cholestyramine Powder (Sugar-Free) 4 Gram(s) Oral daily  diphenoxylate/atropine 2 Tablet(s) Oral every 6 hours  enoxaparin Injectable 100 milliGRAM(s) SubCutaneous every 12 hours  epoetin matt (EPOGEN) Injectable 36648 Unit(s) SubCutaneous every 7 days  fluticasone propionate 50 MICROgram(s)/spray Nasal Spray 1 Spray(s) Both Nostrils two times a day  glucagon  Injectable 1 milliGRAM(s) IntraMuscular once  influenza  Vaccine (HIGH DOSE) 0.7 milliLiter(s) IntraMuscular once  levothyroxine Injectable 40 MICROGram(s) IV Push at bedtime  loperamide 4 milliGRAM(s) Oral every 6 hours  metoprolol tartrate Injectable 5 milliGRAM(s) IV Push every 6 hours  Omegaven 10G/100ML 50 Gram(s) 50 Gram(s) (41.67 mL/Hr) IV Continuous <Continuous>  opium Tincture 6 milliGRAM(s) Oral every 6 hours  Parenteral Nutrition - Adult 1 Each TPN Continuous <Continuous>  Parenteral Nutrition - Adult 1 Each TPN Continuous <Continuous>  sodium chloride 3%  Inhalation 4 milliLiter(s) Inhalation once  ursodiol Suspension 300 milliGRAM(s) Oral every 8 hours  venlafaxine XR. 150 milliGRAM(s) Oral two times a day    MEDICATIONS  (PRN):  LORazepam   Injectable 0.5 milliGRAM(s) IV Push at bedtime PRN Anxiety  ondansetron Injectable 4 milliGRAM(s) IV Push every 8 hours PRN Nausea and/or Vomiting  simethicone 80 milliGRAM(s) Chew daily PRN Gas      Vital Signs Last 24 Hrs  T(C): 37.1 (17 Nov 2023 12:00), Max: 37.1 (17 Nov 2023 06:20)  T(F): 98.7 (17 Nov 2023 12:00), Max: 98.7 (17 Nov 2023 06:20)  HR: 96 (17 Nov 2023 12:00) (95 - 98)  BP: 138/72 (17 Nov 2023 11:30) (132/60 - 168/75)  BP(mean): 99 (17 Nov 2023 11:30) (87 - 110)  RR: 17 (17 Nov 2023 12:00) (14 - 23)  SpO2: 94% (17 Nov 2023 12:00) (93% - 95%)    Parameters below as of 17 Nov 2023 11:30  Patient On (Oxygen Delivery Method): nasal cannula  O2 Flow (L/min): 2      Physical Exam:  General: NAD, resting comfortably in bed  Pulmonary: Nonlabored breathing, no respiratory distress  Cardiovascular: NSR  Abdominal: soft, NT/ND, wound manager in place, ostomy in place, perc cony bilious  Extremities: WWP, normal strength  Neuro: A/O x 3, CNs II-XII grossly intact, no focal deficits      I&O's Summary    16 Nov 2023 07:01  -  17 Nov 2023 07:00  --------------------------------------------------------  IN: 3398.7 mL / OUT: 4405 mL / NET: -1006.3 mL    17 Nov 2023 07:01  -  17 Nov 2023 18:28  --------------------------------------------------------  IN: 755 mL / OUT: 580 mL / NET: 175 mL        LABS:                        8.0    10.42 )-----------( 157      ( 17 Nov 2023 05:30 )             25.7     11-17    137  |  100  |  36<H>  ----------------------------<  126<H>  3.6   |  29  |  0.97    Ca    8.8      17 Nov 2023 05:30  Phos  4.3     11-17  Mg     2.0     11-17    TPro  7.8  /  Alb  2.5<L>  /  TBili  5.7<H>  /  DBili  3.9<H>  /  AST  96<H>  /  ALT  72<H>  /  AlkPhos  129<H>  11-17      Urinalysis Basic - ( 17 Nov 2023 05:30 )    Color: x / Appearance: x / SG: x / pH: x  Gluc: 126 mg/dL / Ketone: x  / Bili: x / Urobili: x   Blood: x / Protein: x / Nitrite: x   Leuk Esterase: x / RBC: x / WBC x   Sq Epi: x / Non Sq Epi: x / Bacteria: x      CAPILLARY BLOOD GLUCOSE      POCT Blood Glucose.: 91 mg/dL (17 Nov 2023 11:46)    LIVER FUNCTIONS - ( 17 Nov 2023 05:30 )  Alb: 2.5 g/dL / Pro: 7.8 g/dL / ALK PHOS: 129 U/L / ALT: 72 U/L / AST: 96 U/L / GGT: x             RADIOLOGY & ADDITIONAL STUDIES:

## 2023-11-17 NOTE — ADVANCED PRACTICE NURSE CONSULT - ASSESSMENT
[employer] wound manager changed d/t other leaking. Output has increased and thickened significantly today, Dr. Samuel aware. Extra stoma paste and stoma rings applied to periwound to prevent leakage over the weekend. Intact and de-roofed blisters noted over sacrum but do not appear to be caused by pressure. Pt denies sitting on anything that may have caused the wounds. Triad ointment applied, foam dressing to cover.

## 2023-11-17 NOTE — PROGRESS NOTE ADULT - ASSESSMENT
78 M w/ Crohn's, AFib/Flutter s/p DCCVs on amiodarone, remote ileocectomy and open appendectomy. Admitted (6/23) for SBO vs Crohns flare, s/p NGT decompression and s/p lap TRE converted to open TRE, SBR x 3, left in discontinuity with abthera vac on (6/27), RTOR for ileocolic resection, small bowel anastomosis, and abdominal wall closure on (6/28), c/b fluid collection s/p IR aspiration of perihepatic fluid on (7/3), c/b wound dehiscence s/p RTOR exlap, washout, ileocolic resection with end ileostomy, blow hole colostomy, red rubber from ileostomy to small bowel anastomosis; vicryl bridging mesh on (7/5) transferred to SICU postoperatively for hemodynamic monitoring, with hospital course complicated by periods of AMS most recently on 9/1 and associated with oliguria, severe ELVI and hyponatremia, which resolved but stepped back up for to SICU on 9/10 for acute AMS, intermittent hypoglycemia, AFib with RVR. Percutaneous Cholecystostomy placed on 9/11 for enlarged GB, PCT capped 10/5. 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 Left Clay drain removed 11/7. Tolerating regular diet. Wound manager with stable output, complaining of shortness of breath, CT negative for PE, bilateral trace pleural effusion. SOB improving with Duoneb,  Elevated WBC now downtrending, persistent elevated LFT this AM although downtrending, outputs from midline wound and drains stable.      Plan   - Pulmonary toilet   - Follow nutrition recs   - Rest of care as per SICU   - Team 5 will follow

## 2023-11-17 NOTE — PROGRESS NOTE ADULT - ASSESSMENT
78 M w/ Crohn's, AFib/Flutter s/p DCCVs on amiodarone, remote ileocectomy and open appendectomy. Admitted (6/23) for SBO vs Crohns flare, s/p NGT decompression and s/p lap TRE converted to open TRE, SBR x 3, left in discontinuity with abthera vac on (6/27), RTOR for ileocolic resection, small bowel anastomosis, and abdominal wall closure on (6/28), c/b fluid collection s/p IR aspiration of perihepatic fluid on (7/3), c/b wound dehiscence s/p RTOR exlap, washout, ileocolic resection with end ileostomy, blow hole colostomy, red rubber from ileostomy to small bowel anastomosis; vicryl bridging mesh on (7/5) transferred to SICU postoperatively for hemodynamic monitoring, with hospital course complicated by periods of AMS most recently on 9/1 and associated with oliguria, severe ELVI and hyponatremia, which resolved but stepped back up for to SICU on 9/10 for acute AMS, intermittent hypoglycemia, AFib with RVR. Percutaneous Cholecystostomy placed on 9/11 for enlarged GB, PCT capped 10/5. 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 Left Clay drain removed 11/7. Tolerating regular diet. Wound manager with stable output, complaining of shortness of breath, CT 11/13 showed bilateral mild-to-moderate pleural effusion, ground class opacities c/f atypical PNA, but no evidence of PE. COVID and Flu negative. Nasal swab pos for MSSA. Downtrending T-bili today. Persistent significant perc cony output     Plan   - Continue chest PT/IS/OOB   - Appreciate Hepatology input - trend LFT and avoid hepatotoxins   - Await authorization of Teduglutide   - Low fat diet/TPN   - Appreciate wound care team care   - Appreciate SICU care   - Team 4c following     D/w chief resident and attending

## 2023-11-17 NOTE — PROGRESS NOTE ADULT - SUBJECTIVE AND OBJECTIVE BOX
ON: Pouched leaked       SUBJECTIVE: Patient reports feeling better.     MEDICATIONS  (STANDING):  cefepime   IVPB 2000 milliGRAM(s) IV Intermittent every 8 hours  chlorhexidine 0.12% Liquid 15 milliLiter(s) Swish and Spit two times a day  chlorhexidine 2% Cloths 1 Application(s) Topical <User Schedule>  cholestyramine Powder (Sugar-Free) 4 Gram(s) Oral daily  diphenoxylate/atropine 2 Tablet(s) Oral every 6 hours  enoxaparin Injectable 100 milliGRAM(s) SubCutaneous every 12 hours  epoetin matt (EPOGEN) Injectable 75184 Unit(s) SubCutaneous every 7 days  fluticasone propionate 50 MICROgram(s)/spray Nasal Spray 1 Spray(s) Both Nostrils two times a day  glucagon  Injectable 1 milliGRAM(s) IntraMuscular once  influenza  Vaccine (HIGH DOSE) 0.7 milliLiter(s) IntraMuscular once  levothyroxine Injectable 40 MICROGram(s) IV Push at bedtime  loperamide 4 milliGRAM(s) Oral every 6 hours  metoprolol tartrate Injectable 5 milliGRAM(s) IV Push every 6 hours  Omegaven 10G/100ML 50 Gram(s) 50 Gram(s) (41.67 mL/Hr) IV Continuous <Continuous>  opium Tincture 6 milliGRAM(s) Oral every 6 hours  Parenteral Nutrition - Adult 1 Each TPN Continuous <Continuous>  Parenteral Nutrition - Adult 1 Each TPN Continuous <Continuous>  sodium chloride 3%  Inhalation 4 milliLiter(s) Inhalation once  ursodiol Suspension 300 milliGRAM(s) Oral every 8 hours  venlafaxine XR. 150 milliGRAM(s) Oral two times a day    MEDICATIONS  (PRN):  LORazepam   Injectable 0.5 milliGRAM(s) IV Push at bedtime PRN Anxiety  ondansetron Injectable 4 milliGRAM(s) IV Push every 8 hours PRN Nausea and/or Vomiting  simethicone 80 milliGRAM(s) Chew daily PRN Gas      Drips:     ICU Vital Signs Last 24 Hrs  T(C): 37.1 (17 Nov 2023 12:00), Max: 37.1 (17 Nov 2023 06:20)  T(F): 98.7 (17 Nov 2023 12:00), Max: 98.7 (17 Nov 2023 06:20)  HR: 96 (17 Nov 2023 12:00) (95 - 99)  BP: 138/72 (17 Nov 2023 11:30) (127/61 - 168/75)  BP(mean): 99 (17 Nov 2023 11:30) (87 - 110)  ABP: --  ABP(mean): --  RR: 17 (17 Nov 2023 12:00) (14 - 23)  SpO2: 94% (17 Nov 2023 12:00) (91% - 95%)    O2 Parameters below as of 17 Nov 2023 11:30  Patient On (Oxygen Delivery Method): nasal cannula  O2 Flow (L/min): 2          Physical Exam:  General: NAD, resting comfortably in bed  Pulmonary: Nonlabored breathing, no respiratory distress  Cardiovascular: NSR  Abdominal: soft, NT/ND, wound manager in place, ostomy in place, perc cony bilious  Extremities: WWP, normal strength  Neuro: A/O x 3, CNs II-XII grossly intact, no focal deficits              I&O's Summary    16 Nov 2023 07:01  -  17 Nov 2023 07:00  --------------------------------------------------------  IN: 3398.7 mL / OUT: 4405 mL / NET: -1006.3 mL    17 Nov 2023 07:01  -  17 Nov 2023 14:15  --------------------------------------------------------  IN: 755 mL / OUT: 480 mL / NET: 275 mL        LABS:                        8.0    10.42 )-----------( 157      ( 17 Nov 2023 05:30 )             25.7     11-17    137  |  100  |  36<H>  ----------------------------<  126<H>  3.6   |  29  |  0.97    Ca    8.8      17 Nov 2023 05:30  Phos  4.3     11-17  Mg     2.0     11-17    TPro  7.8  /  Alb  2.5<L>  /  TBili  5.7<H>  /  DBili  3.9<H>  /  AST  96<H>  /  ALT  72<H>  /  AlkPhos  129<H>  11-17      Urinalysis Basic - ( 17 Nov 2023 05:30 )    Color: x / Appearance: x / SG: x / pH: x  Gluc: 126 mg/dL / Ketone: x  / Bili: x / Urobili: x   Blood: x / Protein: x / Nitrite: x   Leuk Esterase: x / RBC: x / WBC x   Sq Epi: x / Non Sq Epi: x / Bacteria: x      CAPILLARY BLOOD GLUCOSE      POCT Blood Glucose.: 91 mg/dL (17 Nov 2023 11:46)    LIVER FUNCTIONS - ( 17 Nov 2023 05:30 )  Alb: 2.5 g/dL / Pro: 7.8 g/dL / ALK PHOS: 129 U/L / ALT: 72 U/L / AST: 96 U/L / GGT: x

## 2023-11-18 NOTE — PROGRESS NOTE ADULT - SUBJECTIVE AND OBJECTIVE BOX
24 hr events: Placed back on 2L NC      SUBJECTIVE: Endorsing mild SOB this AM, but denied cough fever, chills. Voiding spontaneously and adequately. Good enteric function per ostomy. Tolerating diet and TPN       MEDICATIONS  (STANDING):  cefepime   IVPB 2000 milliGRAM(s) IV Intermittent every 8 hours  chlorhexidine 0.12% Liquid 15 milliLiter(s) Swish and Spit two times a day  chlorhexidine 2% Cloths 1 Application(s) Topical <User Schedule>  cholestyramine Powder (Sugar-Free) 4 Gram(s) Oral daily  diphenoxylate/atropine 2 Tablet(s) Oral every 6 hours  enoxaparin Injectable 100 milliGRAM(s) SubCutaneous every 12 hours  epoetin matt (EPOGEN) Injectable 45036 Unit(s) SubCutaneous every 7 days  fluticasone propionate 50 MICROgram(s)/spray Nasal Spray 1 Spray(s) Both Nostrils two times a day  glucagon  Injectable 1 milliGRAM(s) IntraMuscular once  influenza  Vaccine (HIGH DOSE) 0.7 milliLiter(s) IntraMuscular once  levothyroxine Injectable 40 MICROGram(s) IV Push at bedtime  loperamide 4 milliGRAM(s) Oral every 6 hours  metoprolol tartrate Injectable 5 milliGRAM(s) IV Push every 6 hours  OMEGAVEN 10 MG/100 ML VIAL 50 Gram(s) 500 milliLiter(s) (41.67 mL/Hr) IV Continuous <Continuous>  Omegaven 10G/100ML 50 Gram(s) 50 Gram(s) (41.67 mL/Hr) IV Continuous <Continuous>  opium Tincture 6 milliGRAM(s) Oral every 6 hours  Parenteral Nutrition - Adult 1 Each TPN Continuous <Continuous>  Parenteral Nutrition - Adult 1 Each TPN Continuous <Continuous>  ursodiol Suspension 300 milliGRAM(s) Oral every 8 hours  venlafaxine XR. 150 milliGRAM(s) Oral two times a day    MEDICATIONS  (PRN):  LORazepam   Injectable 0.5 milliGRAM(s) IV Push at bedtime PRN Anxiety  ondansetron Injectable 4 milliGRAM(s) IV Push every 8 hours PRN Nausea and/or Vomiting  simethicone 80 milliGRAM(s) Chew daily PRN Gas      Poole:	  [ ] None	[ ] Daily Poole Order Placed	   Indication:	  [ ] Strict I and O's    [ ] Obstruction     [ ] Incontinence + Stage 3 or 4 Decubitus  Central Line:  [ ] None	   [ ]  Medication / TPN Administration     [ ] No Peripheral IV     ICU Vital Signs Last 24 Hrs  T(C): 36.5 (18 Nov 2023 16:43), Max: 36.8 (18 Nov 2023 12:07)  T(F): 97.7 (18 Nov 2023 16:43), Max: 98.3 (18 Nov 2023 12:07)  HR: 97 (18 Nov 2023 18:00) (93 - 98)  BP: 131/72 (18 Nov 2023 18:00) (131/72 - 162/74)  BP(mean): 96 (18 Nov 2023 18:00) (96 - 110)  ABP: --  ABP(mean): --  RR: 22 (18 Nov 2023 18:00) (16 - 22)  SpO2: 96% (18 Nov 2023 18:00) (95% - 100%)    O2 Parameters below as of 18 Nov 2023 18:00  Patient On (Oxygen Delivery Method): nasal cannula  O2 Flow (L/min): 1        Physical Exam:  General: NAD, resting comfortably in bed  Pulmonary: Nonlabored breathing, no respiratory distress  Cardiovascular: NSR  Abdominal: soft, NT/ND, wound manager in place, ostomy in place, perc cony bilious  Extremities: WWP, normal strength  Neuro: A/O x 3, CNs II-XII grossly intact, no focal deficits      Lines/tubes/drains:    Vent settings:      I&O's Summary    17 Nov 2023 07:01  -  18 Nov 2023 07:00  --------------------------------------------------------  IN: 3207.7 mL / OUT: 3170 mL / NET: 37.7 mL    18 Nov 2023 07:01  -  18 Nov 2023 19:51  --------------------------------------------------------  IN: 1756.7 mL / OUT: 3530 mL / NET: -1773.3 mL        LABS:                        8.2    9.89  )-----------( 151      ( 18 Nov 2023 05:52 )             26.5     11-18    137  |  103  |  34<H>  ----------------------------<  128<H>  4.1   |  27  |  1.00    Ca    8.7      18 Nov 2023 05:52  Phos  4.0     11-18  Mg     2.1     11-18    TPro  8.0  /  Alb  2.4<L>  /  TBili  6.0<H>  /  DBili  4.1<H>  /  AST  115<H>  /  ALT  85<H>  /  AlkPhos  129<H>  11-18      Urinalysis Basic - ( 18 Nov 2023 05:52 )    Color: x / Appearance: x / SG: x / pH: x  Gluc: 128 mg/dL / Ketone: x  / Bili: x / Urobili: x   Blood: x / Protein: x / Nitrite: x   Leuk Esterase: x / RBC: x / WBC x   Sq Epi: x / Non Sq Epi: x / Bacteria: x      CAPILLARY BLOOD GLUCOSE        LIVER FUNCTIONS - ( 18 Nov 2023 05:52 )  Alb: 2.4 g/dL / Pro: 8.0 g/dL / ALK PHOS: 129 U/L / ALT: 85 U/L / AST: 115 U/L / GGT: x             Cultures:    Drips:    RADIOLOGY & ADDITIONAL STUDIES:

## 2023-11-18 NOTE — PROGRESS NOTE ADULT - ASSESSMENT
78 M w/ Crohn's, AFib/Flutter s/p DCCVs on amiodarone, remote ileocectomy and open appendectomy. Admitted (6/23) for SBO vs Crohns flare, s/p NGT decompression and s/p lap TRE converted to open TRE, SBR x 3, left in discontinuity with abthera vac on (6/27), RTOR for ileocolic resection, small bowel anastomosis, and abdominal wall closure on (6/28), c/b fluid collection s/p IR aspiration of perihepatic fluid on (7/3), c/b wound dehiscence s/p RTOR exlap, washout, ileocolic resection with end ileostomy, blow hole colostomy, red rubber from ileostomy to small bowel anastomosis; vicryl bridging mesh on (7/5) transferred to SICU postoperatively for hemodynamic monitoring, with hospital course complicated by periods of AMS most recently on 9/1 and associated with oliguria, severe ELVI and hyponatremia, which resolved but stepped back up for to SICU on 9/10 for acute AMS, intermittent hypoglycemia, AFib with RVR. Percutaneous Cholecystostomy placed on 9/11 for enlarged GB, PCT capped 10/5. 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 Left Clay drain removed 11/7. Tolerating regular diet. Wound manager with stable output, complaining of shortness of breath, CT 11/13 showed bilateral mild-to-moderate pleural effusion, ground class opacities c/f atypical PNA, but no evidence of PE. COVID and Flu negative. Nasal swab pos for MSSA. Downtrending T-bili today. Persistent significant perc cony output     Plan   - Continue chest PT/IS/OOB   - Appreciate Hepatology input - trend LFT and avoid hepatotoxins   - Await authorization of Teduglutide   - Low fat diet/TPN   - Appreciate wound care team care   - Appreciate SICU care

## 2023-11-18 NOTE — PROGRESS NOTE ADULT - SUBJECTIVE AND OBJECTIVE BOX
ON: AMRITA, Net even at 6AM  11/17:  TPN ordered. Eakens replaced. Hepatology requested back per pt.       SUBJECTIVE: Patient seen and examined bedside by Surgical resident. States that he was feeling ok this monring, remains with increased work of breathing, states that the IV lasix 20 was very helpful in aiding his ability to breath.     cefepime   IVPB 2000 milliGRAM(s) IV Intermittent every 8 hours  enoxaparin Injectable 100 milliGRAM(s) SubCutaneous every 12 hours  metoprolol tartrate Injectable 5 milliGRAM(s) IV Push every 6 hours    MEDICATIONS  (PRN):  LORazepam   Injectable 0.5 milliGRAM(s) IV Push at bedtime PRN Anxiety  ondansetron Injectable 4 milliGRAM(s) IV Push every 8 hours PRN Nausea and/or Vomiting  simethicone 80 milliGRAM(s) Chew daily PRN Gas      I&O's Detail    17 Nov 2023 07:01  -  18 Nov 2023 07:00  --------------------------------------------------------  IN:    freetext medication - Infusion: 457.7 mL    IV PiggyBack: 100 mL    IV PiggyBack: 100 mL    Oral Fluid: 320 mL    TPN (Total Parenteral Nutrition): 2230 mL  Total IN: 3207.7 mL    OUT:    Drain (mL): 30 mL    Drain (mL): 1050 mL    Drain (mL): 740 mL    Voided (mL): 1350 mL  Total OUT: 3170 mL    Total NET: 37.7 mL      18 Nov 2023 07:01  -  18 Nov 2023 22:25  --------------------------------------------------------  IN:    freetext medication - Infusion: 166.8 mL    IV PiggyBack: 100 mL    Oral Fluid: 600 mL    TPN (Total Parenteral Nutrition): 1285 mL  Total IN: 2151.8 mL    OUT:    Drain (mL): 80 mL    Drain (mL): 450 mL    Drain (mL): 1200 mL    Voided (mL): 2100 mL  Total OUT: 3830 mL    Total NET: -1678.2 mL          Vital Signs Last 24 Hrs  T(C): 36.5 (18 Nov 2023 16:43), Max: 36.8 (18 Nov 2023 12:07)  T(F): 97.7 (18 Nov 2023 16:43), Max: 98.3 (18 Nov 2023 12:07)  HR: 94 (18 Nov 2023 20:52) (94 - 98)  BP: 156/83 (18 Nov 2023 20:52) (131/72 - 162/74)  BP(mean): 112 (18 Nov 2023 20:52) (96 - 112)  RR: 17 (18 Nov 2023 20:52) (17 - 22)  SpO2: 95% (18 Nov 2023 20:52) (95% - 100%)    Parameters below as of 18 Nov 2023 20:52  Patient On (Oxygen Delivery Method): room air        Physical Exam:  General: NAD, resting comfortably in bed  Pulmonary: Nonlabored breathing, no respiratory distress  Cardiovascular: NSR  Abdominal: soft, NT/ND, wound manager in place, ostomy in place, perc cony bilious  Extremities: WWP, normal strength  Neuro: A/O x 3, CNs II-XII grossly intact, no focal deficits    LABS:                        8.2    9.89  )-----------( 151      ( 18 Nov 2023 05:52 )             26.5     11-18    137  |  103  |  34<H>  ----------------------------<  128<H>  4.1   |  27  |  1.00    Ca    8.7      18 Nov 2023 05:52  Phos  4.0     11-18  Mg     2.1     11-18    TPro  8.0  /  Alb  2.4<L>  /  TBili  6.0<H>  /  DBili  4.1<H>  /  AST  115<H>  /  ALT  85<H>  /  AlkPhos  129<H>  11-18      Urinalysis Basic - ( 18 Nov 2023 05:52 )    Color: x / Appearance: x / SG: x / pH: x  Gluc: 128 mg/dL / Ketone: x  / Bili: x / Urobili: x   Blood: x / Protein: x / Nitrite: x   Leuk Esterase: x / RBC: x / WBC x   Sq Epi: x / Non Sq Epi: x / Bacteria: x        RADIOLOGY & ADDITIONAL STUDIES:

## 2023-11-18 NOTE — PROGRESS NOTE ADULT - ASSESSMENT
78M w PMH Crohn's, AFib/Flutter s/p DCCVs on amiodarone, remote ileocectomy and open appendectomy. Admitted (6/23) for SBO vs Crohns flare, s/p NGT decompression and s/p lap converted to open TRE, SBR x 3, left in discontinuity with abthera vac on (6/27), RTOR for ileocolic resection, small bowel anastomosis, and abdominal wall closure on (6/28), c/b fluid collection s/p IR aspiration of perihepatic fluid on (7/3), c/b wound dehiscence s/p RTOR exlap, washout, ileocolic resection with end ileostomy, blow hole colostomy, red rubber from ileostomy to small bowel anastomosis; vicryl bridging mesh on (7/5) transferred to SICU postoperatively for hemodynamic monitoring, with hospital course complicated by periods of severe ELVI and hyponatremia, which resolved but stepped back up for to SICU on (9/10) for acute AMS, intermittent hypoglycemia, AFib with RVR. Percutaneous Cholecystostomy placed on (9/11) for enlarged GB, PCT capped 10/5 and uncapped 10/25 for hyperbilirubinemia--improving after changes to TPN made. Right brachial DVT, left basillic and cephalic superficial thrombus on duplex 11/2, on Lovenox 100 BID.     NEURO: Hospital course with AMS (resolved), EEG neg. Hx of depression - cont Effexor. Insomnia: Melatonin PRN. Nausea: Zofran PRN. Anxiety: Lorazepam PRN.   HEENT: Cepacol prn  CV: Hx Afib/flutter: s/p DCCV,  Amio held for Increased LFT'S. Continue Metoprolol 5q6. Hx HLD: Lipitor held given high LFTs. TTE  (7/18) - PASP 64mmHg, EF 65-70%,  Hx HTN -  holding Norvasc 10qd. Holding Losartan. Cont Lopressor 5 q6. s/p Albumin 25% q8 x3 days. Repeat echo pending  PULM: mild SOB, no sig resp distress on room air. Chest CT no PE, has some pleural effusion R>L, and left upper patchy ground glass opacities -- ??micro-aspiration??, POCUS not much B lines, has bilateral effusion, but not too impressive. Lasix 20 mg today. COVID negative. MRSA surveillance swab negative. empiric cefepime for now.   GI/FEN: High protein diet. Cont Ensure max x1/day. Cont TPN/ lipids - Continue Omegavan to reduce cholecystasis:  (2.3 L/day volume) for high ostomy and ECF output: Cont Octreotide in TPN, Imodium, Lomotil, Tincture of Opium, Cholestyramine 10/18. Transaminitis of unknown origin, with downtrending LFTs, stable Tbili. s/p Perc Choley on 9/11; MRCP with no obstruction, done as per Hepatology started on ursidiol, cont PPI. monitor drainage from fistula, bolus as needed to keep I&O even. GI following, considering Gattex. Tbili back up to 6.1 - consulted Hepatology. lowered Carbs and fat in TPN/lipids.,   : Voids.   ENDO: Hx hypothyroid: IV Synthroid, TSH wnl on 10/23  ID: chest CT with ALDEN with ground glass opacities, afebrile and unimpressive leukocytosis, unclear etiology. will give empiric cefepime for now. (11/15--) Bld Cx's NGTD. Cont Nystatin powder // PREVIOUSLY had C. tertium, Lactobacillis from his IR cx 7/3, and candida albicans, lactobacillus, vanc sensitive E faecium, vanc resistant E gallinarum, vanc resistant E casseliflavus, lactobacillus from his OR cx 7/5. Completed course of abx with Imipenem (9/10-9/15). Imipenem (8/26--) imipenem (6/30-7/12, 7/23-7/30), Dapto (6/30-7/5 and 7/23-7/24). Empiric dapto (8/23-24) and cefepime (8/23-24).   PPX: SCDs, Therapeutic lovenox due to R brachial vein DVT (11/3 - )  HEME: continue Iron Sucrose 200 qd x 3 days for chronic anemia.  LINES: PIVs, RUE PICC: (11/1-) DC'd: LUE PICC (9/1-11/1 )  WOUNDS/DRAINS: Abdominal wound and fistula with wound manager in place dressing change T & F. RLQ Ostomy. IR perc cony. // DC: L Clay drain.  PT: Ambulate as tolerated   DISPO: SICU due to complicated hospital course. but will do vitals q4hrs (hold overnight), and CBC qod/BMP QD in setting of high fistula output.

## 2023-11-18 NOTE — PROGRESS NOTE ADULT - ATTENDING COMMENTS
Extensive hospital course as outline above, now remains in SICU for close monitoring of dyspnea, renal function, nutritional status. Continues to complain of relatively new onset dyspnea, not related to chronic anemia as H/H unchanged, may have some diaphragmatic dysfunction given prolonged ICU stay, but relative acute onset makes it seem less likely. Does have small to mod B/L pleural effusions. Will trial lasix for pleural effusions, monitor via US and consider thoracentesis if increasing. Rest as above.    Direct personal management at bed side and extensive interpretation of the data.  Plan was outlined and discussed in details with the housestaff.  Decision making of highest complexity  Action taken for acute disease activity to reflect the level of care provided:  - medication reconciliation  - review laboratory data

## 2023-11-19 NOTE — PROGRESS NOTE ADULT - SUBJECTIVE AND OBJECTIVE BOX
INTERVAL/OVERNIGHT EVENTS: Had bloody output from ostomy. Hg this AM 8.1.    SUBJECTIVE: Feels well today. No N/V and tolerating diet. Having dark bloody output from wound site, no LH/dizziness. No CP/SOB, on RA today.   Neurologic Medications  LORazepam   Injectable 0.5 milliGRAM(s) IV Push at bedtime PRN Anxiety  ondansetron Injectable 4 milliGRAM(s) IV Push every 8 hours PRN Nausea and/or Vomiting  opium Tincture 6 milliGRAM(s) Oral every 6 hours  venlafaxine XR. 150 milliGRAM(s) Oral two times a day    Cardiovascular Medications  metoprolol tartrate Injectable 5 milliGRAM(s) IV Push every 6 hours    Gastrointestinal Medications  diphenoxylate/atropine 2 Tablet(s) Oral every 6 hours  loperamide 4 milliGRAM(s) Oral every 6 hours  pantoprazole  Injectable 40 milliGRAM(s) IV Push daily  Parenteral Nutrition - Adult 1 Each TPN Continuous <Continuous>  Parenteral Nutrition - Adult 1 Each TPN Continuous <Continuous>  simethicone 80 milliGRAM(s) Chew daily PRN Gas  ursodiol Suspension 300 milliGRAM(s) Oral every 8 hours    Hematologic/Oncologic Medications  epoetin matt (EPOGEN) Injectable 78757 Unit(s) SubCutaneous every 7 days  influenza  Vaccine (HIGH DOSE) 0.7 milliLiter(s) IntraMuscular once    Antimicrobial/Immunologic Medications  cefepime   IVPB 2000 milliGRAM(s) IV Intermittent every 8 hours    Endocrine/Metabolic Medications  cholestyramine Powder (Sugar-Free) 4 Gram(s) Oral daily  glucagon  Injectable 1 milliGRAM(s) IntraMuscular once  levothyroxine Injectable 40 MICROGram(s) IV Push at bedtime    Topical/Other Medications  chlorhexidine 0.12% Liquid 15 milliLiter(s) Swish and Spit two times a day  chlorhexidine 2% Cloths 1 Application(s) Topical <User Schedule>  fluticasone propionate 50 MICROgram(s)/spray Nasal Spray 1 Spray(s) Both Nostrils two times a day  OMEGAVEN 10 MG/100 ML VIAL 50 Gram(s) 500 milliLiter(s) IV Continuous <Continuous>  Omegaven 10G/100ML 50 Gram(s) 50 Gram(s) IV Continuous <Continuous>  OMEGAVEN 10GM/100 ML VIAL 500 milliLiter(s)   IV Continuous <Continuous>  Omegaven 10gm/100ml  vial 500 milliLiter(s)   IV Continuous <Continuous>    MEDICATIONS  (PRN):  LORazepam   Injectable 0.5 milliGRAM(s) IV Push at bedtime PRN Anxiety  ondansetron Injectable 4 milliGRAM(s) IV Push every 8 hours PRN Nausea and/or Vomiting  simethicone 80 milliGRAM(s) Chew daily PRN Gas    I&O's Detail    18 Nov 2023 07:01  -  19 Nov 2023 07:00  --------------------------------------------------------  IN:    freetext medication - Infusion: 458.7 mL    IV PiggyBack: 150 mL    Oral Fluid: 600 mL    TPN (Total Parenteral Nutrition): 2230 mL  Total IN: 3438.7 mL    OUT:    Drain (mL): 800 mL    Drain (mL): 2050 mL    Drain (mL): 110 mL    Voided (mL): 3000 mL  Total OUT: 5960 mL    Total NET: -2521.3 mL    19 Nov 2023 07:01  -  19 Nov 2023 16:20  --------------------------------------------------------  IN:    IV PiggyBack: 50 mL    Oral Fluid: 100 mL    TPN (Total Parenteral Nutrition): 945 mL  Total IN: 1095 mL    OUT:    Drain (mL): 250 mL    Drain (mL): 1025 mL    Voided (mL): 1250 mL  Total OUT: 2525 mL    Total NET: -1430 mL    Vital Signs Last 24 Hrs  T(C): 36.9 (19 Nov 2023 14:55), Max: 36.9 (19 Nov 2023 14:55)  T(F): 98.4 (19 Nov 2023 14:55), Max: 98.4 (19 Nov 2023 14:55)  HR: 96 (19 Nov 2023 14:55) (94 - 97)  BP: 132/63 (19 Nov 2023 14:55) (121/62 - 163/73)  BP(mean): 92 (19 Nov 2023 14:55) (84 - 112)  RR: 19 (19 Nov 2023 14:55) (17 - 24)  SpO2: 98% (19 Nov 2023 14:55) (91% - 100%)    Parameters below as of 19 Nov 2023 14:55  Patient On (Oxygen Delivery Method): room air    GENERAL: NAD, resting comfortably in bed and chair, jaundiced  HEENT: NCAT, MMM  C/V: Normal rate, normal peripheral perfusion, no murmurs  PULM: Nonlabored breathing, no respiratory distress, CTA b/l  ABD: Soft, ND, NT, no rebound tenderness, no guarding, ostomy bag in place w dark red bloody output. Perc cony tube in place, dark bilious output.    EXTREM: WWP, no edema, SCDs in place  NEURO: No focal deficits    LABS:                        8.1    11.36 )-----------( 153      ( 19 Nov 2023 05:16 )             25.9     11-19    138  |  103  |  38<H>  ----------------------------<  135<H>  4.3   |  27  |  0.96    Ca    8.6      19 Nov 2023 05:16  Phos  4.0     11-18  Mg     2.1     11-18    TPro  8.0  /  Alb  2.4<L>  /  TBili  6.0<H>  /  DBili  4.0<H>  /  AST  114<H>  /  ALT  83<H>  /  AlkPhos  143<H>  11-19    Urinalysis Basic - ( 19 Nov 2023 05:16 )    Color: x / Appearance: x / SG: x / pH: x  Gluc: 135 mg/dL / Ketone: x  / Bili: x / Urobili: x   Blood: x / Protein: x / Nitrite: x   Leuk Esterase: x / RBC: x / WBC x   Sq Epi: x / Non Sq Epi: x / Bacteria: x

## 2023-11-19 NOTE — PROGRESS NOTE ADULT - SUBJECTIVE AND OBJECTIVE BOX
Feels better with less SOB VS stable Afeb In RR Decreased BS at bases trace LE edema Labs ok  ?superficial bleed

## 2023-11-19 NOTE — PROGRESS NOTE ADULT - ASSESSMENT
78M w PMH Crohn's, AFib/Flutter s/p DCCVs on amiodarone, remote ileocectomy and open appendectomy. Admitted (6/23) for SBO vs Crohns flare, s/p NGT decompression and s/p lap converted to open TRE, SBR x 3, left in discontinuity with abthera vac on (6/27), RTOR for ileocolic resection, small bowel anastomosis, and abdominal wall closure on (6/28), c/b fluid collection s/p IR aspiration of perihepatic fluid on (7/3), c/b wound dehiscence s/p RTOR exlap, washout, ileocolic resection with end ileostomy, blow hole colostomy, red rubber from ileostomy to small bowel anastomosis; vicryl bridging mesh on (7/5) transferred to SICU postoperatively for hemodynamic monitoring, with hospital course complicated by periods of severe ELVI and hyponatremia, which resolved but stepped back up for to SICU on (9/10) for acute AMS, intermittent hypoglycemia, AFib with RVR. Percutaneous Cholecystostomy placed on (9/11) for enlarged GB, PCT capped 10/5 and uncapped 10/25 for hyperbilirubinemia--improving after changes to TPN made. Right brachial DVT, left basillic and cephalic superficial thrombus on duplex 11/2, on Lovenox 100 BID.       1U pPRBC followed by lasix  rest of care as per SICU

## 2023-11-19 NOTE — PROGRESS NOTE ADULT - SUBJECTIVE AND OBJECTIVE BOX
SUBJECTIVE: Pt seen and examined by chief resident. Pt is doing well, resting comfortably on bed. Feeling well but concerned with bloody output from wound manager    Vital Signs Last 24 Hrs  T(C): 36.3 (19 Nov 2023 12:06), Max: 36.6 (18 Nov 2023 22:39)  T(F): 97.4 (19 Nov 2023 12:06), Max: 97.9 (18 Nov 2023 22:39)  HR: 95 (19 Nov 2023 14:19) (94 - 97)  BP: 121/62 (19 Nov 2023 14:19) (121/62 - 163/73)  BP(mean): 84 (19 Nov 2023 14:19) (84 - 112)  RR: 18 (19 Nov 2023 14:19) (17 - 24)  SpO2: 95% (19 Nov 2023 14:19) (91% - 100%)    Parameters below as of 19 Nov 2023 10:19  Patient On (Oxygen Delivery Method): room air        I&O's Summary    18 Nov 2023 07:01  -  19 Nov 2023 07:00  --------------------------------------------------------  IN: 3438.7 mL / OUT: 5960 mL / NET: -2521.3 mL    19 Nov 2023 07:01  -  19 Nov 2023 14:42  --------------------------------------------------------  IN: 960 mL / OUT: 1525 mL / NET: -565 mL        Physical Exam:  General Appearance: Appears well, NAD  Pulmonary: Nonlabored breathing, no respiratory distress  Abdomen: Soft, nondistended nontender, wound manager in place with blood tinged output  Extremities: WWP, SCD's in place     LABS:                        8.1    11.36 )-----------( 153      ( 19 Nov 2023 05:16 )             25.9     11-19    138  |  103  |  38<H>  ----------------------------<  135<H>  4.3   |  27  |  0.96    Ca    8.6      19 Nov 2023 05:16  Phos  4.0     11-18  Mg     2.1     11-18    TPro  8.0  /  Alb  2.4<L>  /  TBili  6.0<H>  /  DBili  4.0<H>  /  AST  114<H>  /  ALT  83<H>  /  AlkPhos  143<H>  11-19      Urinalysis Basic - ( 19 Nov 2023 05:16 )    Color: x / Appearance: x / SG: x / pH: x  Gluc: 135 mg/dL / Ketone: x  / Bili: x / Urobili: x   Blood: x / Protein: x / Nitrite: x   Leuk Esterase: x / RBC: x / WBC x   Sq Epi: x / Non Sq Epi: x / Bacteria: x

## 2023-11-19 NOTE — PROGRESS NOTE ADULT - ASSESSMENT
78M w PMH Crohn's, AFib/Flutter s/p DCCVs on amiodarone, remote ileocectomy and open appendectomy. Admitted (6/23) for SBO vs Crohns flare, s/p NGT decompression and s/p lap converted to open TRE, SBR x 3, left in discontinuity with abthera vac on (6/27), RTOR for ileocolic resection, small bowel anastomosis, and abdominal wall closure on (6/28), c/b fluid collection s/p IR aspiration of perihepatic fluid on (7/3), c/b wound dehiscence s/p RTOR exlap, washout, ileocolic resection with end ileostomy, blow hole colostomy, red rubber from ileostomy to small bowel anastomosis; vicryl bridging mesh on (7/5) transferred to SICU postoperatively for hemodynamic monitoring, with hospital course complicated by periods of severe ELVI and hyponatremia, which resolved but stepped back up for to SICU on (9/10) for acute AMS, intermittent hypoglycemia, AFib with RVR. Percutaneous Cholecystostomy placed on (9/11) for enlarged GB, PCT capped 10/5 and uncapped 10/25 for hyperbilirubinemia--improving after changes to TPN made. Right brachial DVT, left basillic and cephalic superficial thrombus on duplex 11/2, on Lovenox 100 BID--on hold for bloody ostomy output.     NEURO: Hospital course with AMS (resolved), EEG neg. Hx of depression - cont Effexor. Insomnia: Melatonin PRN. Nausea: Zofran PRN. Anxiety: Lorazepam PRN.   HEENT: Cepacol prn  CV: Hx Afib/flutter: s/p DCCV, Amio held for Increased LFT'S. Continue Metoprolol 5q6. Hx HLD: Lipitor held given high LFTs. TTE  (7/18) - PASP 64mmHg, EF 65-70%,  Hx HTN -  holding Norvasc 10qd. Holding Losartan. Cont Lopressor 5 q6. s/p Albumin 25% q8 x3 days. Repeat echo pending  PULM: mild SOB, no sig resp distress on room air. Chest CT no PE, has some pleural effusion R>L, and left upper patchy ground glass opacities -- ??micro-aspiration??, POCUS not much B lines, has bilateral effusion, but not too impressive. COVID negative. check RVP panel, MRSA surveillance swab negative. empiric cefepime for now.   GI/FEN: High protein diet. Cont Ensure max x1/day. Cont TPN/ lipids - Continue Omegavan to reduce cholecystasis:  (2.3 L/day volume) for high ostomy and ECF output: Cont Octreotide in TPN, Imodium, Lomotil, Tincture of Opium, Cholestyramine 10/18. Transaminitis of unknown origin, with downtrending LFTs, stable Tbili. s/p Perc Deb on 9/11; MRCP with no obstruction, done as per Hepatology started on ursidiol, cont PPI. monitor drainage from fistula, bolus as needed to keep I&O even. GI following, considering Gattex. Tbili back up to 6.1 - consulted Hepatology. lowered Carbs and fat in TPN/lipids.  : Voids.   ENDO: Hx hypothyroid: IV Synthroid, TSH wnl on 10/23  ID: chest CT with ALDEN with ground glass opacities, afebrile and unimpressive leukocytosis, unclear etiology. will give empiric cefepime for now. (11/15--) Bld Cx's NGTD. Cont Nystatin powder // PREVIOUSLY had C. tertium, Lactobacillis from his IR cx 7/3, and candida albicans, lactobacillus, vanc sensitive E faecium, vanc resistant E gallinarum, vanc resistant E casseliflavus, lactobacillus from his OR cx 7/5. Completed course of abx with Imipenem (9/10-9/15). Imipenem (8/26--) imipenem (6/30-7/12, 7/23-7/30), Dapto (6/30-7/5 and 7/23-7/24). Empiric dapto (8/23-24) and cefepime (8/23-24).   PPX: SCDs, Therapeutic lovenox due to R brachial vein DVT (11/3 - )  HEME: continue Iron Sucrose 200 qd x 3 days for chronic anemia. holding SQL due to bleeding, Hg 8.1 but with persistent bloody output from ostomy. 1U pRBC today with Lasix 20. Will take down wound manager to assess--but likely intraluminal based on ostomy content.  LINES: PIVs, HOLLIS PICC: (11/1-) DC'd: ZAHEER PICC (9/1-11/1 )  WOUNDS/DRAINS: Abdominal wound and fistula with wound manager in place dressing change T & F. RLQ Ostomy. IR perc deb. // DC: L Clay drain.  PT: Ambulate as tolerated   DISPO: SICU due to complicated hospital course. but will do vitals q4hrs (hold overnight), and CBC qod/BMP QD in setting of high fistula output.

## 2023-11-19 NOTE — PROGRESS NOTE ADULT - ATTENDING COMMENTS
Extensive hospital course as outline above, now remains in SICU for close monitoring of dyspnea, renal function, nutritional status. Dyspnea improved with diuresis, but now having bleeding from ostomy - suspect surpificial wound as stool in ostomy itself does not appear to be melanotic. Will take down ostomy and further evaluate. 1u pRBC for bleeding, will give w/ lasix.  Continue to monitor pleural effusions and consider thoracentesis if enlarging despite diuresis. Rest as above.     Direct personal management at bed side and extensive interpretation of the data.  Plan was outlined and discussed in details with the housestaff.  Decision making of highest complexity  Action taken for acute disease activity to reflect the level of care provided:  - medication reconciliation  - review laboratory data

## 2023-11-20 NOTE — PROGRESS NOTE ADULT - SUBJECTIVE AND OBJECTIVE BOX
INTERVAL/OVERNIGHT EVENTS: NAEO. No longer with blood in wound manager after silver nitrate and surgicell.     SUBJECTIVE: This AM, doing well without issues. No N/V or abd pain. No LH or dizziness. Has been on RA without SOB.     Neurologic Medications  LORazepam   Injectable 0.5 milliGRAM(s) IV Push at bedtime PRN Anxiety  ondansetron Injectable 4 milliGRAM(s) IV Push every 8 hours PRN Nausea and/or Vomiting  opium Tincture 6 milliGRAM(s) Oral every 6 hours  venlafaxine XR. 150 milliGRAM(s) Oral two times a day    Cardiovascular Medications  metoprolol tartrate Injectable 5 milliGRAM(s) IV Push every 6 hours    Gastrointestinal Medications  diphenoxylate/atropine 2 Tablet(s) Oral every 6 hours  loperamide 4 milliGRAM(s) Oral every 6 hours  pantoprazole  Injectable 40 milliGRAM(s) IV Push daily  Parenteral Nutrition - Adult 1 Each TPN Continuous <Continuous>  simethicone 80 milliGRAM(s) Chew daily PRN Gas  ursodiol Suspension 300 milliGRAM(s) Oral every 8 hours    Hematologic/Oncologic Medications  epoetin matt (EPOGEN) Injectable 22796 Unit(s) SubCutaneous every 7 days  influenza  Vaccine (HIGH DOSE) 0.7 milliLiter(s) IntraMuscular once    Antimicrobial/Immunologic Medications  cefepime   IVPB 2000 milliGRAM(s) IV Intermittent every 8 hours    Endocrine/Metabolic Medications  cholestyramine Powder (Sugar-Free) 4 Gram(s) Oral daily  glucagon  Injectable 1 milliGRAM(s) IntraMuscular once  levothyroxine Injectable 40 MICROGram(s) IV Push at bedtime    Topical/Other Medications  chlorhexidine 0.12% Liquid 15 milliLiter(s) Swish and Spit two times a day  chlorhexidine 2% Cloths 1 Application(s) Topical <User Schedule>  fluticasone propionate 50 MICROgram(s)/spray Nasal Spray 1 Spray(s) Both Nostrils two times a day  OMEGAVEN 10 MG/100 ML VIAL 50 Gram(s) 500 milliLiter(s) IV Continuous <Continuous>  Omegaven 10G/100ML 50 Gram(s) 50 Gram(s) IV Continuous <Continuous>  OMEGAVEN 10GM/100 ML VIAL 500 milliLiter(s) 500 milliLiter(s) IV Continuous <Continuous>    MEDICATIONS  (PRN):  LORazepam   Injectable 0.5 milliGRAM(s) IV Push at bedtime PRN Anxiety  ondansetron Injectable 4 milliGRAM(s) IV Push every 8 hours PRN Nausea and/or Vomiting  simethicone 80 milliGRAM(s) Chew daily PRN Gas    I&O's Detail    19 Nov 2023 07:01  -  20 Nov 2023 07:00  --------------------------------------------------------  IN:    freetext medication - Infusion: 250.7 mL    IV PiggyBack: 100 mL    Oral Fluid: 200 mL    PRBCs (Packed Red Blood Cells): 300 mL    TPN (Total Parenteral Nutrition): 1690 mL  Total IN: 2540.7 mL    OUT:    Drain (mL): 55 mL    Drain (mL): 450 mL    Drain (mL): 2025 mL    Voided (mL): 2250 mL  Total OUT: 4780 mL    Total NET: -2239.3 mL    Vital Signs Last 24 Hrs  T(C): 36.8 (20 Nov 2023 05:40), Max: 36.9 (19 Nov 2023 14:55)  T(F): 98.3 (20 Nov 2023 05:40), Max: 98.4 (19 Nov 2023 14:55)  HR: 92 (20 Nov 2023 04:00) (92 - 96)  BP: 160/78 (20 Nov 2023 04:00) (121/62 - 160/78)  BP(mean): 107 (20 Nov 2023 04:00) (83 - 107)  RR: 20 (20 Nov 2023 04:00) (16 - 24)  SpO2: 96% (20 Nov 2023 04:00) (94% - 100%)    Parameters below as of 20 Nov 2023 04:00  Patient On (Oxygen Delivery Method): room air    GENERAL: NAD, resting comfortably in bed, AxOx3  HEENT: NCAT, MMM  C/V: Normal rate, normal peripheral perfusion, no murmurs  PULM: Nonlabored breathing, no respiratory distress, CTA b/l  ABD: Soft, ND, NT, no rebound tenderness, no guarding, wound manager in place, brown output  EXTREM: WWP, dependent pitting edema, SCDs in place  NEURO: No focal deficits    LABS:                        8.6    10.91 )-----------( 143      ( 20 Nov 2023 05:27 )             27.0     11-20    137  |  102  |  36<H>  ----------------------------<  117<H>  4.5   |  27  |  1.06    Ca    8.8      20 Nov 2023 05:27  Phos  3.6     11-20  Mg     2.2     11-20    TPro  7.9  /  Alb  2.3<L>  /  TBili  5.8<H>  /  DBili  x   /  AST  119<H>  /  ALT  88<H>  /  AlkPhos  149<H>  11-20    Urinalysis Basic - ( 20 Nov 2023 05:27 )    Color: x / Appearance: x / SG: x / pH: x  Gluc: 117 mg/dL / Ketone: x  / Bili: x / Urobili: x   Blood: x / Protein: x / Nitrite: x   Leuk Esterase: x / RBC: x / WBC x   Sq Epi: x / Non Sq Epi: x / Bacteria: x

## 2023-11-20 NOTE — PROGRESS NOTE ADULT - ASSESSMENT
78M w PMH Crohn's, AFib/Flutter s/p DCCVs on amiodarone, remote ileocectomy and open appendectomy. Admitted (6/23) for SBO vs Crohns flare, s/p NGT decompression and s/p lap converted to open TRE, SBR x 3, left in discontinuity with abthera vac on (6/27), RTOR for ileocolic resection, small bowel anastomosis, and abdominal wall closure on (6/28), c/b fluid collection s/p IR aspiration of perihepatic fluid on (7/3), c/b wound dehiscence s/p RTOR exlap, washout, ileocolic resection with end ileostomy, blow hole colostomy, red rubber from ileostomy to small bowel anastomosis; vicryl bridging mesh on (7/5) transferred to SICU postoperatively for hemodynamic monitoring, with hospital course complicated by periods of severe ELVI and hyponatremia, which resolved but stepped back up for to SICU on (9/10) for acute AMS, intermittent hypoglycemia, AFib with RVR. Percutaneous Cholecystostomy placed on (9/11) for enlarged GB, PCT capped 10/5 and uncapped 10/25 for hyperbilirubinemia--improving after changes to TPN made. Right brachial DVT, left basillic and cephalic superficial thrombus on duplex 11/2, on Lovenox 100 BID--on hold for bloody ostomy output.     NEURO: Hospital course with AMS (resolved), EEG neg. Hx of depression - cont Effexor. Insomnia: Melatonin PRN. Nausea: Zofran PRN. Anxiety: Lorazepam PRN.   HEENT: Cepacol prn  CV: Hx Afib/flutter: s/p DCCV, Amio held for Increased LFT'S. Continue Metoprolol 5q6. Hx HLD: Lipitor held given high LFTs. TTE  (7/18) - PASP 64mmHg, EF 65-70%,  Hx HTN -  holding Norvasc 10qd. Holding Losartan. Cont Lopressor 5 q6. s/p Albumin 25% q8 x3 days. Repeat echo pending  PULM: mild SOB, no sig resp distress on room air. Chest CT no PE, has some pleural effusion R>L, and left upper patchy ground glass opacities -- ??micro-aspiration??, POCUS not much B lines, has bilateral effusion, but not too impressive. COVID negative. check RVP panel, MRSA surveillance swab negative. empiric cefepime for now.   GI/FEN: High protein diet. Cont Ensure max x1/day. Cont TPN/ lipids - Continue Omegavan to reduce cholecystasis:  (2.3 L/day volume) for high ostomy and ECF output: Cont Octreotide in TPN, Imodium, Lomotil, Tincture of Opium, Cholestyramine 10/18. Transaminitis of unknown origin, with downtrending LFTs, stable Tbili. s/p Perc Deb on 9/11; MRCP with no obstruction, done as per Hepatology started on ursidiol, cont PPI. monitor drainage from fistula, bolus as needed to keep I&O even. GI following, considering Gattex. Tbili back up to 6.1 - consulted Hepatology. lowered Carbs and fat in TPN/lipids. Hyperbilirubinemia:   : Voids.   ENDO: Hx hypothyroid: IV Synthroid, TSH wnl on 10/23  ID: chest CT with ALDEN with ground glass opacities, afebrile and unimpressive leukocytosis, unclear etiology. will give empiric cefepime for now. (11/15--) Bld Cx's NGTD. Cont Nystatin powder // PREVIOUSLY had C. tertium, Lactobacillis from his IR cx 7/3, and candida albicans, lactobacillus, vanc sensitive E faecium, vanc resistant E gallinarum, vanc resistant E casseliflavus, lactobacillus from his OR cx 7/5. Completed course of abx with Imipenem (9/10-9/15). Imipenem (8/26--) imipenem (6/30-7/12, 7/23-7/30), Dapto (6/30-7/5 and 7/23-7/24). Empiric dapto (8/23-24) and cefepime (8/23-24).   PPX: SCDs, Resume therapeutic lovenox due to R brachial vein DVT (11/3 - )  HEME: continue Iron Sucrose 200 qd x 3 days for chronic anemia. holding SQL due to bleeding, Hg 8.1 but with persistent bloody output from ostomy. 1U pRBC today with Lasix 20. Will take down wound manager to assess--but likely intraluminal based on ostomy content.  LINES: PIVs, HOLLIS PICC: (11/1-) DC'd: ZAHEER PICC (9/1-11/1 )  WOUNDS/DRAINS: Abdominal wound and fistula with wound manager in place dressing change T & F. RLQ Ostomy. IR perc deb. // DC: L Clay drain.  PT: Ambulate as tolerated   DISPO: SICU due to complicated hospital course. but will do vitals q4hrs (hold overnight), and CBC qod/BMP QD in setting of high fistula output. 78M w PMH Crohn's, AFib/Flutter s/p DCCVs on amiodarone, remote ileocectomy and open appendectomy. Admitted (6/23) for SBO vs Crohns flare, s/p NGT decompression and s/p lap converted to open TRE, SBR x 3, left in discontinuity with abthera vac on (6/27), RTOR for ileocolic resection, small bowel anastomosis, and abdominal wall closure on (6/28), c/b fluid collection s/p IR aspiration of perihepatic fluid on (7/3), c/b wound dehiscence s/p RTOR exlap, washout, ileocolic resection with end ileostomy, blow hole colostomy, red rubber from ileostomy to small bowel anastomosis; vicryl bridging mesh on (7/5) transferred to SICU postoperatively for hemodynamic monitoring, with hospital course complicated by periods of severe ELVI and hyponatremia, which resolved but stepped back up for to SICU on (9/10) for acute AMS, intermittent hypoglycemia, AFib with RVR. Percutaneous Cholecystostomy placed on (9/11) for enlarged GB, PCT capped 10/5 and uncapped 10/25 for hyperbilirubinemia--improving after changes to TPN made. Right brachial DVT, left basillic and cephalic superficial thrombus on duplex 11/2, on Lovenox 100 BID--on hold for bloody ostomy output.     NEURO: Hospital course with AMS (resolved), EEG neg. Hx of depression - cont Effexor. Insomnia: Melatonin PRN. Nausea: Zofran PRN. Anxiety: Lorazepam PRN.   HEENT: Cepacol prn  CV: Hx Afib/flutter: s/p DCCV, Amio held for Increased LFT'S. Continue Metoprolol 5q6. Hx HLD: Lipitor held given high LFTs. TTE  (7/18) - PASP 64mmHg, EF 65-70%,  Hx HTN -  holding Norvasc 10qd. Holding Losartan. Cont Lopressor 5 q6. s/p Albumin 25% q8 x3 days. Repeat echo pending  PULM: mild SOB, no sig resp distress on room air. Chest CT no PE, has some pleural effusion R>L, and left upper patchy ground glass opacities -- ??micro-aspiration??, POCUS not much B lines, has bilateral effusion, but not too impressive. COVID negative. check RVP panel, MRSA surveillance swab negative. empiric cefepime for now.   GI/FEN: High protein diet. Cont Ensure max x1/day. Cont TPN/ lipids - Continue Omegavan to reduce cholecystasis:  (2.3 L/day volume) for high ostomy and ECF output: Cont Octreotide in TPN, Imodium, Lomotil, Tincture of Opium, Cholestyramine 10/18. Transaminitis of unknown origin, with downtrending LFTs, stable Tbili. s/p Perc Deb on 9/11; MRCP with no obstruction, done as per Hepatology started on ursidiol, cont PPI. monitor drainage from fistula, bolus as needed to keep I&O even. GI following, considering Gattex. Tbili back up to 6.1 - consulted Hepatology. lowered Carbs and fat in TPN/lipids. Hyperbilirubinemia:   : Voids.   ENDO: Hx hypothyroid: IV Synthroid, TSH wnl on 10/23  ID: chest CT with ALDEN with ground glass opacities, afebrile and unimpressive leukocytosis, unclear etiology. will give empiric cefepime for now. (11/15--) Bld Cx's NGTD. Cont Nystatin powder // PREVIOUSLY had C. tertium, Lactobacillis from his IR cx 7/3, and candida albicans, lactobacillus, vanc sensitive E faecium, vanc resistant E gallinarum, vanc resistant E casseliflavus, lactobacillus from his OR cx 7/5. Completed course of abx with Imipenem (9/10-9/15). Imipenem (8/26--) imipenem (6/30-7/12, 7/23-7/30), Dapto (6/30-7/5 and 7/23-7/24). Empiric dapto (8/23-24) and cefepime (8/23-24).   PPX: SCDs, Resume therapeutic lovenox due to R brachial vein DVT (11/3 - )  HEME: continue Iron Sucrose 200 qd x 3 days for chronic anemia. holding SQL due to bleeding, Hg 8.1 but with persistent bloody output from ostomy. 1U pRBC today with Lasix 20. Will take down wound manager to assess--but likely intraluminal based on ostomy content.  LINES: PIVs, HOLLIS PICC: (11/1-) DC'd: ZAHEER PICC (9/1-11/1 )  WOUNDS/DRAINS: Abdominal wound and fistula with wound manager in place dressing change T & F. RLQ Ostomy. IR perc deb. // DC: L Clay drain.  PT: Ambulate as tolerated   DISPO: SICU due to complicated hospital course. but will do vitals q4hrs (hold overnight), and CBC qod/BMP QD in setting of high fistula output. Awaiting AUTH.

## 2023-11-20 NOTE — CHART NOTE - NSCHARTNOTEFT_GEN_A_CORE
Admitting Diagnosis:   Patient is a 78y old  Male who presents with a chief complaint of SBO (20 Nov 2023 07:08)      PAST MEDICAL & SURGICAL HISTORY:  Essential hypertension  Hypertension      Atrial fibrillation  s/p cardioversion 2010 and 2014  Pt. reports 4 DCCV  Now on Amiodarone 200mg PO bid and Eliquis 5mg PO bid  Last DCCV 4 yrs ago at [facility]      Crohn's disease  s/p partial resection of ileum      Hyperlipidemia      Hypothyroidism      History of depression  On Venlafaxine ER 150mg PO bid      Junctional rhythm      H/O knee surgery      History of cataract surgery          Current Nutrition Order:  TPN via PICC- 2.3L bag running over 18hrs (70ml/hr), providing 411g Dex, 144g AA, 80g Omegaven lipids; provides 2303.4 kcals, 144g protein, GIR 2.66 mg/kg/min  PO- Low fat PO diet + 1 LPS BID [provides 200 kcals, 30g protein] + 1 Ensure Max daily [150 kcals, 20g protein]    PO Intake: Good (%) [ X  ]  Fair (50-75%) [   ] Poor (<25%) [   ]    GI Issues:   11/20: ileostomy output 55cc x 24hrs, abdominal wound/fistula output 2025 cc x 24hrs, per cony output 450cc x 24hrs     11/15:  ileostomy output 65cc x 24hrs, abdominal wound/fistula output 875cc x 24hrs, per cony output 775cc x 24hrs    Pain: denies pain/discomfort    Skin Integrity: abd wound [10.5 x 6.5cm] and fistula with wound manager in place, RLQ ileostomy, perc cony drain, skin tear below ostomy, wound to lower back. wound to R heel, blisters to sacrum. Rudy score 18    Labs:   11-20    137  |  102  |  36<H>  ----------------------------<  117<H>  4.5   |  27  |  1.06    Ca    8.8      20 Nov 2023 05:27  Phos  3.6     11-20  Mg     2.2     11-20    TPro  7.9  /  Alb  2.3<L>  /  TBili  5.8<H>  /  DBili  x   /  AST  119<H>  /  ALT  88<H>  /  AlkPhos  149<H>  11-20    CAPILLARY BLOOD GLUCOSE          Medications:  MEDICATIONS  (STANDING):  cefepime   IVPB 2000 milliGRAM(s) IV Intermittent every 8 hours  chlorhexidine 0.12% Liquid 15 milliLiter(s) Swish and Spit two times a day  chlorhexidine 2% Cloths 1 Application(s) Topical <User Schedule>  cholestyramine Powder (Sugar-Free) 4 Gram(s) Oral daily  diphenoxylate/atropine 2 Tablet(s) Oral every 6 hours  enoxaparin Injectable 100 milliGRAM(s) SubCutaneous every 12 hours  epoetin matt (EPOGEN) Injectable 26381 Unit(s) SubCutaneous every 7 days  fluticasone propionate 50 MICROgram(s)/spray Nasal Spray 1 Spray(s) Both Nostrils two times a day  glucagon  Injectable 1 milliGRAM(s) IntraMuscular once  influenza  Vaccine (HIGH DOSE) 0.7 milliLiter(s) IntraMuscular once  levothyroxine Injectable 40 MICROGram(s) IV Push at bedtime  loperamide 4 milliGRAM(s) Oral every 6 hours  metoprolol tartrate Injectable 5 milliGRAM(s) IV Push every 6 hours  Omegaven 10G/100Ml 50 Gram(s) 50 Gram(s) (41.67 mL/Hr) IV Continuous <Continuous>  opium Tincture 6 milliGRAM(s) Oral every 6 hours  pantoprazole  Injectable 40 milliGRAM(s) IV Push daily  Parenteral Nutrition - Adult 1 Each TPN Continuous <Continuous>  Parenteral Nutrition - Adult 1 Each TPN Continuous <Continuous>  ursodiol Suspension 300 milliGRAM(s) Oral every 8 hours  venlafaxine XR. 150 milliGRAM(s) Oral two times a day    MEDICATIONS  (PRN):  LORazepam   Injectable 0.5 milliGRAM(s) IV Push at bedtime PRN Anxiety  ondansetron Injectable 4 milliGRAM(s) IV Push every 8 hours PRN Nausea and/or Vomiting  simethicone 80 milliGRAM(s) Chew daily PRN Gas      Weight: 94.2kg [16 Nov 2023]  Daily 94.2 [15 Nov 2023]  Daily 94.2kg [13 Nov 2023]  Daily 95.8kg [09 Nov 2023]  Daily 94.2kg [07 Nov 2023]  Daily 85.2kg [03 Nov 2023]  Daily 85.2kg [31 Oct 2023]  Daily 85.2kg [24 Oct 2023]  Daily 85.2kg [20 Oct 2023]  Daily 81.9kg [18 Oct 2023]  Daily 85.2kg [16 Oct 2023]  Daily   95.2kg [12 Oct 2023]   Daily 87.7kg [11 Oct 2023]  Daily 87.4kg [09 Oct 2023]  Daily 86.4kg [07 Oct 2023]  Daily 82.5kg [06 Oct 2023]  Daily 82.6kg [4 Oct 2023]   Daily 83.5kg [03 Oct 2023]  Daily 84.5kg [2 Oct 2023]  Daily 87.2kg [1 Oct 2023]  Daily 88.3kg [29 Sep 2023]  Daily 89.9kg [28 Sep 2023]  Daily 87.7kg [24 Sep 2023]  Daily 90.4kg [21 Sep 2023]  Daily 91.4kg [20 Sep 2023]  Daily 86.7kg [18 Sep 2023]  Daily 88.1kg [16 Sep 2023]  Daily 88.9kg [15 Sep 2023]   Daily 89.1 [14 Sep 2023]  Daily 92.9kg [6 Sep 2023]  Daily 91.8kg [27 Aug 2023]  Daily 101kg [9 Aug 2023]     Weight Change:  weight trending down throughout admission, now appears to be trending back up. Recommend continue weekly/daily weights for trending & ensuring adequacy of nutrition provision    IBW: 86.4kg   % IBW: 109.0%    Estimated energy needs:   Ideal body weight (86.4kg) used for calculations as pt >100% IBW and BMI <30 per [facility] Standards of Care. Needs estimated for age and adjusted for current clinical status, increased needs for post-op & open abd wound healing    Calories: 8399-1502 kcals based on 30-40 kcals/kg  Protein: 129.6-172.8 g protein based on 1.5-2.0g protein/kg + additional depending on outputs  *Fluid needs per team    Subjective:   78 M w/ Crohn's, AFib/Flutter s/p DCCVs on amiodarone, remote ileocectomy and open appendectomy. Admitted (6/23) for SBO vs Crohns flare, s/p NGT decompression and s/p lap TRE converted to open TRE, SBR x 3, left in discontinuity with abthera vac on (6/27), RTOR for ileocolic resection, small bowel anastomosis, and abdominal wall closure on (6/28), c/b fluid collection s/p IR aspiration of perihepatic fluid on (7/3), c/b wound dehiscence s/p RTOR exlap, washout, ileocolic resection with end ileostomy, blow hole colostomy, red rubber from ileostomy to small bowel anastomosis; vicryl bridging mesh on (7/5) transferred to SICU postoperatively for hemodynamic monitoring, with hospital course complicated by periods of AMS most recently on 9/1 and associated with oliguria, severe ELVI and hyponatremia, which resolved but stepped back up for to SICU on 9/10 for acute AMS, intermittent hypoglycemia, AFib with RVR. Percutaneous Cholecystostomy placed on 9/11 for enlarged GB, PCT capped 10/5.    Chart reviewed, discussed with IDT. Pt seen at bedside on 5 EA, remains on TPN & PO diet. Dextrose in lipids in TPN decreased 11/15 due to increase in t. alex and direct alex. Current TPN provision provides 26.7 kcals/kg, 20.0 non-protein kcals/kg, 1.7g protein/kg. Noted total bilirubin of 5.8 today, yesterday: direct bili 4.0 <H>, indirect bili 2.0 <H> [increasing]. Alk phos 149 <H>,  <H>, ALT 88 <H>. Noted >2L outputs x 24hrs, with bleeding from midline ostomy. Diet education provided to pt on avoiding foods high in insoluble fiber, encourage easily digestable foods. Written educational handout provided. Other notable labs: BUN 36 <H>,  <H>, HDL 7 <L>. selenium 79 <L> [11/12], Vit C 0.3 <L> [11/12]. Meds reviewed. RDN will continue to monitor, reassess, and intervene as appropriate.       Previous Nutrition Diagnosis:  Increased Nutrient Needs R/T physiological demands for nutrient AEB post-op wound healing    Active [ X  ]  Resolved [   ]    If resolved, new PES:     Goal: Pt will meet at least 75% of protein & energy needs via most appropriate route for nutrition     Recommendations:  1. c/w TPN via PICC over 18hrs- c/w 361g Dex, 144g AA  - recommend trial of providing lipids 3x/week [MWF schedule]- 50g Omegaven lipids  - to provide 2017.7 kcals daily, 144g protein, GIR 2.66 mg/kg/min [23.4 kcals/kg, 16.7 non-protein kcals/kg, 1.67g protein/kg IBW]  - to monitor tolerance & hepatic labs, recommend increasing provision to meet needs once tolerance established  - consider cycling TPN over 12-16hrs  - MVI, selenium, zinc in TPN bag  - consider increasing vit C in bag  - provide copper in TPN 3x/week [MWF]-- pending repeat levels  - monitor weights & wound healing, adjust substrates as indicated  2. PO diet per team discretion  - current: Low fat PO diet + 1 LPS BID [200 kcals, 30g protein] + 1 Ensure Max daily [150 kcals, 30g protein]  - emphasis on small, high protein meals  - ongoing diet education prn  - hold PO diet as indicated  3. Fluids & lytes per team  - consider oral rehydration solution prn  4. Weekly lipid panel   - hold/decrease lipids if TG >400  5. Monitor BMP/Mg/Phos, POC BG  - monitor & replenish lytes outside TPN bag prn  6. Continue close monitoring of clinical course & adjust recommendations prn    Education: foods high in soluble fiber, low in insoluble fiber & further diet education on current provision    Risk Level: High [  X ] Moderate [   ] Low [   ]

## 2023-11-20 NOTE — PROGRESS NOTE ADULT - ASSESSMENT
78M w PMH Crohn's, AFib/Flutter s/p DCCVs on amiodarone, remote ileocectomy and open appendectomy. Admitted (6/23) for SBO vs Crohns flare, s/p NGT decompression and s/p lap converted to open TRE, SBR x 3, left in discontinuity with abthera vac on (6/27), RTOR for ileocolic resection, small bowel anastomosis, and abdominal wall closure on (6/28), c/b fluid collection s/p IR aspiration of perihepatic fluid on (7/3), c/b wound dehiscence s/p RTOR exlap, washout, ileocolic resection with end ileostomy, blow hole colostomy, red rubber from ileostomy to small bowel anastomosis; vicryl bridging mesh on (7/5) transferred to SICU postoperatively for hemodynamic monitoring, with hospital course complicated by periods of severe ELVI and hyponatremia, which resolved but stepped back up for to SICU on (9/10) for acute AMS, intermittent hypoglycemia, AFib with RVR. Percutaneous Cholecystostomy placed on (9/11) for enlarged GB, PCT capped 10/5 and uncapped 10/25 for hyperbilirubinemia--improving after changes to TPN made. Right brachial DVT, left basillic and cephalic superficial thrombus on duplex 11/2, on Lovenox 100 BID--on hold for bloody ostomy output. Persistent hyperbilirubinemia and mild transaminitis. Mild, improving leukocytosis     Plan:   - Continue chest PT/IS/OOB   - Appreciate Hepatology input - trend LFT and avoid hepatotoxins    - Low fat diet/TPN   - Resume SQL   - Appreciate wound care team care   - Appreciate SICU care   - Team 4c following

## 2023-11-20 NOTE — PROGRESS NOTE ADULT - SUBJECTIVE AND OBJECTIVE BOX
SUBJECTIVE:   Patient seen and examined this AM   Over the weekend required nasal canula but weaned off. Bleeding noted from midline fistula-ostomy up to 350 cc; SQL held and Protonix started. Upon further exam bleeding thought to be superficial as opposed to intraluminal; controlled with silver nitrate and surgicel   Patient [id number] today, in good spirits   No abdominal pain, nausea or vomiting   Off O2, in room air   Voiding spontaneously and adequately       MEDICATIONS  (STANDING):  cefepime   IVPB 2000 milliGRAM(s) IV Intermittent every 8 hours  chlorhexidine 0.12% Liquid 15 milliLiter(s) Swish and Spit two times a day  chlorhexidine 2% Cloths 1 Application(s) Topical <User Schedule>  cholestyramine Powder (Sugar-Free) 4 Gram(s) Oral daily  diphenoxylate/atropine 2 Tablet(s) Oral every 6 hours  enoxaparin Injectable 100 milliGRAM(s) SubCutaneous every 12 hours  epoetin matt (EPOGEN) Injectable 10930 Unit(s) SubCutaneous every 7 days  fluticasone propionate 50 MICROgram(s)/spray Nasal Spray 1 Spray(s) Both Nostrils two times a day  glucagon  Injectable 1 milliGRAM(s) IntraMuscular once  influenza  Vaccine (HIGH DOSE) 0.7 milliLiter(s) IntraMuscular once  levothyroxine Injectable 40 MICROGram(s) IV Push at bedtime  loperamide 4 milliGRAM(s) Oral every 6 hours  metoprolol tartrate Injectable 5 milliGRAM(s) IV Push every 6 hours  Omegaven 10G/100Ml 50 Gram(s) 50 Gram(s) (41.67 mL/Hr) IV Continuous <Continuous>  opium Tincture 6 milliGRAM(s) Oral every 6 hours  pantoprazole  Injectable 40 milliGRAM(s) IV Push daily  Parenteral Nutrition - Adult 1 Each TPN Continuous <Continuous>  Parenteral Nutrition - Adult 1 Each TPN Continuous <Continuous>  ursodiol Suspension 300 milliGRAM(s) Oral every 8 hours  venlafaxine XR. 150 milliGRAM(s) Oral two times a day    MEDICATIONS  (PRN):  LORazepam   Injectable 0.5 milliGRAM(s) IV Push at bedtime PRN Anxiety  ondansetron Injectable 4 milliGRAM(s) IV Push every 8 hours PRN Nausea and/or Vomiting  simethicone 80 milliGRAM(s) Chew daily PRN Gas      Vital Signs Last 24 Hrs  T(C): 36.8 (20 Nov 2023 05:40), Max: 36.9 (19 Nov 2023 14:55)  T(F): 98.3 (20 Nov 2023 05:40), Max: 98.4 (19 Nov 2023 14:55)  HR: 92 (20 Nov 2023 09:00) (92 - 96)  BP: 136/64 (20 Nov 2023 09:00) (121/62 - 160/78)  BP(mean): 92 (20 Nov 2023 09:00) (83 - 107)  RR: 20 (20 Nov 2023 09:00) (16 - 24)  SpO2: 98% (20 Nov 2023 09:00) (94% - 100%)    Parameters below as of 20 Nov 2023 09:00  Patient On (Oxygen Delivery Method): room air        Physical Exam:  GENERAL: NAD, resting comfortably in bed, AxOx3  HEENT: NCAT, MMM  C/V: Normal rate, normal peripheral perfusion, no murmurs  PULM: Nonlabored breathing, no respiratory distress, CTA b/l  ABD: Soft, ND, NT, no rebound tenderness, no guarding, wound manager in place, brown output  EXTREM: WWP, dependent pitting edema, SCDs in place  NEURO: No focal deficits       I&O's Summary    19 Nov 2023 07:01  -  20 Nov 2023 07:00  --------------------------------------------------------  IN: 2540.7 mL / OUT: 4780 mL / NET: -2239.3 mL    20 Nov 2023 07:01  -  20 Nov 2023 09:40  --------------------------------------------------------  IN: 405 mL / OUT: 1190 mL / NET: -785 mL        LABS:                        8.6    10.91 )-----------( 143      ( 20 Nov 2023 05:27 )             27.0     11-20    137  |  102  |  36<H>  ----------------------------<  117<H>  4.5   |  27  |  1.06    Ca    8.8      20 Nov 2023 05:27  Phos  3.6     11-20  Mg     2.2     11-20    TPro  7.9  /  Alb  2.3<L>  /  TBili  5.8<H>  /  DBili  x   /  AST  119<H>  /  ALT  88<H>  /  AlkPhos  149<H>  11-20      Urinalysis Basic - ( 20 Nov 2023 05:27 )    Color: x / Appearance: x / SG: x / pH: x  Gluc: 117 mg/dL / Ketone: x  / Bili: x / Urobili: x   Blood: x / Protein: x / Nitrite: x   Leuk Esterase: x / RBC: x / WBC x   Sq Epi: x / Non Sq Epi: x / Bacteria: x      CAPILLARY BLOOD GLUCOSE        LIVER FUNCTIONS - ( 20 Nov 2023 05:27 )  Alb: 2.3 g/dL / Pro: 7.9 g/dL / ALK PHOS: 149 U/L / ALT: 88 U/L / AST: 119 U/L / GGT: x             RADIOLOGY & ADDITIONAL STUDIES:

## 2023-11-21 NOTE — PROGRESS NOTE ADULT - SUBJECTIVE AND OBJECTIVE BOX
SUBJECTIVE:   Patient seen and examined at bedside today   No acute overnight events; resting comfortably in bed this AM   No abdominal complaints   Voiding spontaneously and having enteric function       MEDICATIONS  (STANDING):  cefepime   IVPB 2000 milliGRAM(s) IV Intermittent every 8 hours  chlorhexidine 0.12% Liquid 15 milliLiter(s) Swish and Spit two times a day  chlorhexidine 2% Cloths 1 Application(s) Topical <User Schedule>  cholestyramine Powder (Sugar-Free) 4 Gram(s) Oral daily  diphenoxylate/atropine 2 Tablet(s) Oral every 6 hours  enoxaparin Injectable 100 milliGRAM(s) SubCutaneous every 12 hours  epoetin matt (EPOGEN) Injectable 85297 Unit(s) SubCutaneous every 7 days  fluticasone propionate 50 MICROgram(s)/spray Nasal Spray 1 Spray(s) Both Nostrils two times a day  glucagon  Injectable 1 milliGRAM(s) IntraMuscular once  influenza  Vaccine (HIGH DOSE) 0.7 milliLiter(s) IntraMuscular once  levothyroxine Injectable 40 MICROGram(s) IV Push at bedtime  loperamide 4 milliGRAM(s) Oral every 6 hours  metoprolol tartrate Injectable 5 milliGRAM(s) IV Push every 6 hours  opium Tincture 6 milliGRAM(s) Oral every 6 hours  pantoprazole  Injectable 40 milliGRAM(s) IV Push daily  Parenteral Nutrition - Adult 1 Each TPN Continuous <Continuous>  ursodiol Suspension 300 milliGRAM(s) Oral every 8 hours  venlafaxine XR. 150 milliGRAM(s) Oral two times a day    MEDICATIONS  (PRN):  LORazepam   Injectable 0.5 milliGRAM(s) IV Push at bedtime PRN Anxiety  ondansetron Injectable 4 milliGRAM(s) IV Push every 8 hours PRN Nausea and/or Vomiting  simethicone 80 milliGRAM(s) Chew daily PRN Gas      Vital Signs Last 24 Hrs  T(C): 36.5 (21 Nov 2023 09:55), Max: 36.6 (20 Nov 2023 21:34)  T(F): 97.7 (21 Nov 2023 09:55), Max: 97.8 (20 Nov 2023 21:34)  HR: 91 (21 Nov 2023 09:55) (89 - 93)  BP: 113/56 (21 Nov 2023 09:55) (113/56 - 160/73)  BP(mean): 80 (21 Nov 2023 09:55) (80 - 105)  RR: 18 (21 Nov 2023 09:55) (14 - 20)  SpO2: 96% (21 Nov 2023 09:55) (95% - 99%)    Parameters below as of 21 Nov 2023 05:45  Patient On (Oxygen Delivery Method): room air        Physical Exam:  General: NAD, resting comfortably in bed  Pulmonary: Nonlabored breathing, no respiratory distress  Cardiovascular: NSR  Abdominal: soft, NT/ND  Extremities: WWP, normal strength  Neuro: A/O x 3, CNs II-XII grossly intact, no focal deficits      I&O's Summary    20 Nov 2023 07:01  -  21 Nov 2023 07:00  --------------------------------------------------------  IN: 4033.4 mL / OUT: 3865 mL / NET: 168.4 mL    21 Nov 2023 07:01  -  21 Nov 2023 12:28  --------------------------------------------------------  IN: 236.7 mL / OUT: 0 mL / NET: 236.7 mL        LABS:                        8.5    12.09 )-----------( 137      ( 21 Nov 2023 05:06 )             27.2     11-21    135  |  102  |  42<H>  ----------------------------<  110<H>  4.5   |  26  |  1.06    Ca    8.8      21 Nov 2023 05:06  Phos  3.8     11-21  Mg     2.2     11-21    TPro  8.0  /  Alb  2.4<L>  /  TBili  5.9<H>  /  DBili  3.9<H>  /  AST  117<H>  /  ALT  88<H>  /  AlkPhos  164<H>  11-21      Urinalysis Basic - ( 21 Nov 2023 05:06 )    Color: x / Appearance: x / SG: x / pH: x  Gluc: 110 mg/dL / Ketone: x  / Bili: x / Urobili: x   Blood: x / Protein: x / Nitrite: x   Leuk Esterase: x / RBC: x / WBC x   Sq Epi: x / Non Sq Epi: x / Bacteria: x      CAPILLARY BLOOD GLUCOSE        LIVER FUNCTIONS - ( 21 Nov 2023 05:06 )  Alb: 2.4 g/dL / Pro: 8.0 g/dL / ALK PHOS: 164 U/L / ALT: 88 U/L / AST: 117 U/L / GGT: x             RADIOLOGY & ADDITIONAL STUDIES:

## 2023-11-21 NOTE — PROGRESS NOTE ADULT - SUBJECTIVE AND OBJECTIVE BOX
tolerating by mouth  Outputs stable  Slightly elevated BUN  Stable hematocrit  Stable vital signs    Out of bed  Pulmonary toilet  Continue TPN

## 2023-11-21 NOTE — PROGRESS NOTE ADULT - ASSESSMENT
78M w PMH Crohn's, AFib/Flutter s/p DCCVs on amiodarone, remote ileocectomy and open appendectomy. Admitted (6/23) for SBO vs Crohns flare, s/p NGT decompression and s/p lap converted to open TRE, SBR x 3, left in discontinuity with abthera vac on (6/27), RTOR for ileocolic resection, small bowel anastomosis, and abdominal wall closure on (6/28), c/b fluid collection s/p IR aspiration of perihepatic fluid on (7/3), c/b wound dehiscence s/p RTOR exlap, washout, ileocolic resection with end ileostomy, blow hole colostomy, red rubber from ileostomy to small bowel anastomosis; vicryl bridging mesh on (7/5) transferred to SICU postoperatively for hemodynamic monitoring, with hospital course complicated by periods of severe ELVI and hyponatremia, which resolved but stepped back up for to SICU on (9/10) for acute AMS, intermittent hypoglycemia, AFib with RVR. Percutaneous Cholecystostomy placed on (9/11) for enlarged GB, PCT capped 10/5 and uncapped 10/25 for hyperbilirubinemia--improving after changes to TPN made. Right brachial DVT, left basillic and cephalic superficial thrombus on duplex 11/2, on Lovenox 100 BID--on hold for bloody ostomy output. Persistent hyperbilirubinemia and mild transaminitis. Mild, improving leukocytosis. Decreased UOP. Slightly up-trended Cr stable today.     Plan:   - Continue chest PT/IS/OOB   - Appreciate Hepatology input - trend LFT and avoid hepatotoxins    - Monitor Cr tend and UOP   - Low fat diet/TPN    - SCDs & SQL   - Appreciate wound care team care   - Appreciate SICU care   - Team 4c following

## 2023-11-21 NOTE — PROGRESS NOTE ADULT - SUBJECTIVE AND OBJECTIVE BOX
Interval Events:  Patient seen and examined at bedside.      Allergies    penicillin (Angioedema)    Intolerances        Vital Signs Last 24 Hrs  T(C): 36.6 (21 Nov 2023 01:07), Max: 36.6 (20 Nov 2023 21:34)  T(F): 97.8 (21 Nov 2023 01:07), Max: 97.8 (20 Nov 2023 21:34)  HR: 91 (21 Nov 2023 05:45) (89 - 93)  BP: 147/71 (21 Nov 2023 05:45) (115/56 - 160/73)  BP(mean): 102 (21 Nov 2023 05:45) (81 - 107)  RR: 17 (21 Nov 2023 05:45) (14 - 20)  SpO2: 95% (21 Nov 2023 05:45) (95% - 100%)    Parameters below as of 21 Nov 2023 05:45  Patient On (Oxygen Delivery Method): room air        11-20 @ 07:01  -  11-21 @ 07:00  --------------------------------------------------------  IN: 3503.4 mL / OUT: 3190 mL / NET: 313.4 mL      11-20 @ 07:01  -  11-21 @ 07:00  --------------------------------------------------------  IN: 3503.4 mL / OUT: 3190 mL / NET: 313.4 mL        Physical Exam:     Gen: NAD well nourished  Neuro: A&OX3 No deficits  CV:RRR Reg s1s2 noM  Pulm: CTA b/l No w/r/r  Abd: Soft NT ND + BS  Ext: No C/C/E   Vasc: + DP b/l   Skin: no rashes noted  MSK: No joint swelling  Psych: No signs of anxiety or depression      LABS:      CBC Full  -  ( 21 Nov 2023 05:06 )  WBC Count : 12.09 K/uL  RBC Count : 2.66 M/uL  Hemoglobin : 8.5 g/dL  Hematocrit : 27.2 %  Platelet Count - Automated : 137 K/uL  Mean Cell Volume : 102.3 fl  Mean Cell Hemoglobin : 32.0 pg  Mean Cell Hemoglobin Concentration : 31.3 gm/dL  Auto Neutrophil # : x  Auto Lymphocyte # : x  Auto Monocyte # : x  Auto Eosinophil # : x  Auto Basophil # : x  Auto Neutrophil % : x  Auto Lymphocyte % : x  Auto Monocyte % : x  Auto Eosinophil % : x  Auto Basophil % : x    11-21    135  |  102  |  42<H>  ----------------------------<  110<H>  4.5   |  26  |  1.06    Ca    8.8      21 Nov 2023 05:06  Phos  3.8     11-21  Mg     2.2     11-21    TPro  7.9  /  Alb  2.3<L>  /  TBili  5.8<H>  /  DBili  x   /  AST  119<H>  /  ALT  88<H>  /  AlkPhos  149<H>  11-20          Urinalysis Basic - ( 21 Nov 2023 05:06 )    Color: x / Appearance: x / SG: x / pH: x  Gluc: 110 mg/dL / Ketone: x  / Bili: x / Urobili: x   Blood: x / Protein: x / Nitrite: x   Leuk Esterase: x / RBC: x / WBC x   Sq Epi: x / Non Sq Epi: x / Bacteria: x              RADIOLOGY & ADDITIONAL STUDIES (The following images were personally reviewed):          A/p: 78M w PMH Crohn's, AFib/Flutter s/p DCCVs on amiodarone, remote ileocectomy and open appendectomy. Admitted (6/23) for SBO vs Crohns flare, s/p NGT decompression and s/p lap converted to open TRE, SBR x 3, left in discontinuity with abthera vac on (6/27), RTOR for ileocolic resection, small bowel anastomosis, and abdominal wall closure on (6/28), c/b fluid collection s/p IR aspiration of perihepatic fluid on (7/3), c/b wound dehiscence s/p RTOR exlap, washout, ileocolic resection with end ileostomy, blow hole colostomy, red rubber from ileostomy to small bowel anastomosis; vicryl bridging mesh on (7/5) transferred to SICU postoperatively for hemodynamic monitoring, with hospital course complicated by periods of severe ELVI and hyponatremia, which resolved but stepped back up for to SICU on (9/10) for acute AMS, intermittent hypoglycemia, AFib with RVR. Percutaneous Cholecystostomy placed on (9/11) for enlarged GB, PCT capped 10/5 and uncapped 10/25 for hyperbilirubinemia--improving after changes to TPN made. Right brachial DVT, left basillic and cephalic superficial thrombus on duplex 11/2, on Lovenox 100 BID--on hold for bloody ostomy output.     NEURO: Hospital course with AMS (resolved), EEG neg. Hx of depression - cont Effexor. Insomnia: Melatonin PRN. Nausea: Zofran PRN. Anxiety: Lorazepam PRN.   HEENT: Cepacol prn  CV: Hx Afib/flutter: s/p DCCV, Amio held for Increased LFT'S. Continue Metoprolol 5q6. Hx HLD: Lipitor held given high LFTs. TTE  (7/18) - PASP 64mmHg, EF 65-70%,  Hx HTN -  holding Norvasc 10qd. Holding Losartan. Cont Lopressor 5 q6. s/p Albumin 25% q8 x3 days. Repeat echo pending  PULM:  +PNA: COVID negative. RVP negative.  MRSA surveillance swab negative. Cont empiric cefepime for now.   GI/FEN: High protein diet. Cont Ensure max x1/day. Cont TPN - Continue Omegavan QOD to reduce cholestasis  (2.3 L/day volume) for high ostomy and ECF output: Cont Octreotide in TPN, Imodium, Lomotil, Tincture of Opium, Cholestyramine 10/18. Transaminitis of unknown origin, with downtrending LFTs, stable Tbili. s/p Perc Deb on 9/11; MRCP with no obstruction, done as per Hepatology started on ursidiol, cont PPI. monitor drainage from fistula, bolus as needed to keep I&O even. GI following, considering Gattex. Tbili back up to 6.1 - consulted Hepatology. F/u LFT's today( added on )   : Voids.   ENDO: Hx hypothyroid: IV Synthroid, TSH wnl on 10/23  ID: chest CT with ALDEN with ground glass opacities, afebrile and unimpressive leukocytosis, unclear etiology. will give empiric cefepime for now. (11/15--) Bld Cx's NGTD. Cont Nystatin powder // PREVIOUSLY had C. tertium, Lactobacillis from his IR cx 7/3, and candida albicans, lactobacillus, vanc sensitive E faecium, vanc resistant E gallinarum, vanc resistant E casseliflavus, lactobacillus from his OR cx 7/5. Completed course of abx with Imipenem (9/10-9/15). Imipenem (8/26--) imipenem (6/30-7/12, 7/23-7/30), Dapto (6/30-7/5 and 7/23-7/24). Empiric dapto (8/23-24) and cefepime (8/23-24).   PPX: SCDs, Therapeutic lovenox due to R brachial vein DVT (11/3 - )  HEME: continue Iron Sucrose 200 qd x 3 days for chronic anemia. holding SQL due to bleeding, Hg 8.1 but with persistent bloody output from ostomy. 1U pRBC today with Lasix 20. Will take down wound manager to assess--but likely intraluminal based on ostomy content.  LINES: PIVs, RUE PICC: (11/1-) DC'd: LUE PICC (9/1-11/1 )  WOUNDS/DRAINS: Abdominal wound and fistula with wound manager in place dressing change T & F. RLQ Ostomy. IR perc deb. // DC: L Clay drain.  PT: Ambulate as tolerated   DISPO: SICU due to complicated hospital course. but will do vitals q4hrs (hold overnight),  Interval Events:  No events overnight.   Patient seen and examined at bedside.      Allergies    penicillin (Angioedema)    Intolerances        Vital Signs Last 24 Hrs  T(C): 36.6 (21 Nov 2023 01:07), Max: 36.6 (20 Nov 2023 21:34)  T(F): 97.8 (21 Nov 2023 01:07), Max: 97.8 (20 Nov 2023 21:34)  HR: 91 (21 Nov 2023 05:45) (89 - 93)  BP: 147/71 (21 Nov 2023 05:45) (115/56 - 160/73)  BP(mean): 102 (21 Nov 2023 05:45) (81 - 107)  RR: 17 (21 Nov 2023 05:45) (14 - 20)  SpO2: 95% (21 Nov 2023 05:45) (95% - 100%)    Parameters below as of 21 Nov 2023 05:45  Patient On (Oxygen Delivery Method): room air        11-20 @ 07:01  -  11-21 @ 07:00  --------------------------------------------------------  IN: 3503.4 mL / OUT: 3190 mL / NET: 313.4 mL      11-20 @ 07:01  -  11-21 @ 07:00  --------------------------------------------------------  IN: 3503.4 mL / OUT: 3190 mL / NET: 313.4 mL        Physical Exam:       GENERAL: NAD, resting comfortably in bed, AxOx3  HEENT: NCAT, MMM  C/V: Normal rate, normal peripheral perfusion, no murmurs  PULM: Nonlabored breathing, no respiratory distress, CTA b/l  ABD: Soft, ND, NT, no rebound tenderness, no guarding, wound manager in place, brown output  EXTREM: WWP, dependent pitting edema, SCDs in place  NEURO: No focal deficits      LABS:      CBC Full  -  ( 21 Nov 2023 05:06 )  WBC Count : 12.09 K/uL  RBC Count : 2.66 M/uL  Hemoglobin : 8.5 g/dL  Hematocrit : 27.2 %  Platelet Count - Automated : 137 K/uL  Mean Cell Volume : 102.3 fl  Mean Cell Hemoglobin : 32.0 pg  Mean Cell Hemoglobin Concentration : 31.3 gm/dL  Auto Neutrophil # : x  Auto Lymphocyte # : x  Auto Monocyte # : x  Auto Eosinophil # : x  Auto Basophil # : x  Auto Neutrophil % : x  Auto Lymphocyte % : x  Auto Monocyte % : x  Auto Eosinophil % : x  Auto Basophil % : x    11-21    135  |  102  |  42<H>  ----------------------------<  110<H>  4.5   |  26  |  1.06    Ca    8.8      21 Nov 2023 05:06  Phos  3.8     11-21  Mg     2.2     11-21    TPro  7.9  /  Alb  2.3<L>  /  TBili  5.8<H>  /  DBili  x   /  AST  119<H>  /  ALT  88<H>  /  AlkPhos  149<H>  11-20          Urinalysis Basic - ( 21 Nov 2023 05:06 )    Color: x / Appearance: x / SG: x / pH: x  Gluc: 110 mg/dL / Ketone: x  / Bili: x / Urobili: x   Blood: x / Protein: x / Nitrite: x   Leuk Esterase: x / RBC: x / WBC x   Sq Epi: x / Non Sq Epi: x / Bacteria: x              RADIOLOGY & ADDITIONAL STUDIES (The following images were personally reviewed):          A/p: 78M w PMH Crohn's, AFib/Flutter s/p DCCVs on amiodarone, remote ileocectomy and open appendectomy. Admitted (6/23) for SBO vs Crohns flare, s/p NGT decompression and s/p lap converted to open TRE, SBR x 3, left in discontinuity with abthera vac on (6/27), RTOR for ileocolic resection, small bowel anastomosis, and abdominal wall closure on (6/28), c/b fluid collection s/p IR aspiration of perihepatic fluid on (7/3), c/b wound dehiscence s/p RTOR exlap, washout, ileocolic resection with end ileostomy, blow hole colostomy, red rubber from ileostomy to small bowel anastomosis; vicryl bridging mesh on (7/5) transferred to SICU postoperatively for hemodynamic monitoring, with hospital course complicated by periods of severe ELVI and hyponatremia, which resolved but stepped back up for to SICU on (9/10) for acute AMS, intermittent hypoglycemia, AFib with RVR. Percutaneous Cholecystostomy placed on (9/11) for enlarged GB, PCT capped 10/5 and uncapped 10/25 for hyperbilirubinemia--improving after changes to TPN made. Right brachial DVT, left basillic and cephalic superficial thrombus on duplex 11/2, on Lovenox 100 BID--on hold for bloody ostomy output.     NEURO: Hospital course with AMS (resolved), EEG neg. Hx of depression - cont Effexor. Insomnia: Melatonin PRN. Nausea: Zofran PRN. Anxiety: Lorazepam PRN.   HEENT: Cepacol prn  CV: Hx Afib/flutter: s/p DCCV, Amio held for Increased LFT'S. Continue Metoprolol 5q6. Hx HLD: Lipitor held given high LFTs. TTE  (7/18) - PASP 64mmHg, EF 65-70%,  Hx HTN -  holding Norvasc 10qd. Holding Losartan. Cont Lopressor 5 q6. s/p Albumin 25% q8 x3 days. Repeat echo pending  PULM:  +PNA: COVID negative. RVP negative.  MRSA surveillance swab negative. Cont empiric cefepime for now.   GI/FEN: High protein diet. Cont Ensure max x1/day. Cont TPN - Continue Omegavan QOD to reduce cholestasis  (2.3 L/day volume) for high ostomy and ECF output: Cont Octreotide in TPN, Imodium, Lomotil, Tincture of Opium, Cholestyramine 10/18. Transaminitis of unknown origin, with downtrending LFTs, stable Tbili. s/p Perc Deb on 9/11; MRCP with no obstruction, done as per Hepatology started on ursidiol, cont PPI. monitor drainage from fistula, bolus as needed to keep I&O even. GI following, considering Gattex. Tbili back up to 6.1 - consulted Hepatology. F/u LFT's today( added on )   : Voids.   ENDO: Hx hypothyroid: IV Synthroid, TSH wnl on 10/23  ID: chest CT with ALDEN with ground glass opacities, afebrile and unimpressive leukocytosis, unclear etiology. will give empiric cefepime for now. (11/15--) Bld Cx's NGTD. Cont Nystatin powder // PREVIOUSLY had C. tertium, Lactobacillis from his IR cx 7/3, and candida albicans, lactobacillus, vanc sensitive E faecium, vanc resistant E gallinarum, vanc resistant E casseliflavus, lactobacillus from his OR cx 7/5. Completed course of abx with Imipenem (9/10-9/15). Imipenem (8/26--) imipenem (6/30-7/12, 7/23-7/30), Dapto (6/30-7/5 and 7/23-7/24). Empiric dapto (8/23-24) and cefepime (8/23-24).   PPX: SCDs, Therapeutic lovenox due to R brachial vein DVT (11/3 - )  HEME: continue Iron Sucrose 200 qd x 3 days for chronic anemia. holding SQL due to bleeding, Hg 8.1 but with persistent bloody output from ostomy. 1U pRBC today with Lasix 20. Will take down wound manager to assess--but likely intraluminal based on ostomy content.  LINES: PIVs, RUE PICC: (11/1-) DC'd: LUE PICC (9/1-11/1 )  WOUNDS/DRAINS: Abdominal wound and fistula with wound manager in place dressing change T & F. RLQ Ostomy. IR perc deb. // DC: L Clay drain.  PT: Ambulate as tolerated   DISPO: SICU due to complicated hospital course. but will do vitals q4hrs (hold overnight),

## 2023-11-21 NOTE — ADVANCED PRACTICE NURSE CONSULT - ASSESSMENT
New wound/fistula  using smaller [employer] appliance. Wound with scarred periwound, red, healthy granulation tissue in wound bed, measures 6 x 4 cm. Thicker effluent being produced. Pt wanted the smaller appliance used so reminded him that it would need to be emptied more frequently because it doesn't hold as much. Clip used at bottom of pouch to assist in emptying thicker effluent. Stoma rings and stoma past applied to periwound prior to placing pouch.

## 2023-11-22 NOTE — CHART NOTE - NSCHARTNOTEFT_GEN_A_CORE
Admitting Diagnosis:   Patient is a 78y old  Male who presents with a chief complaint of Crohns (2023 07:02)      PAST MEDICAL & SURGICAL HISTORY:  Essential hypertension  Hypertension      Atrial fibrillation  s/p cardioversion  and   Pt. reports 4 DCCV  Now on Amiodarone 200mg PO bid and Eliquis 5mg PO bid  Last DCCV 4 yrs ago at [facility]      Crohn's disease  s/p partial resection of ileum      Hyperlipidemia      Hypothyroidism      History of depression  On Venlafaxine ER 150mg PO bid      Junctional rhythm      H/O knee surgery      History of cataract surgery          Current Nutrition Order:  TPN via PICC- 2.2L bag running over 18hrs (70ml/hr), providing 411g Dex, 144g AA, 80g Omegaven lipids; provides 2303.4 kcals, 144g protein, GIR 2.66 mg/kg/min  PO- Low fat PO diet + 1 LPS BID [provides 200 kcals, 30g protein] + 1 Ensure Max daily [150 kcals, 20g protein]    PO Intake: Good (%) [ X  ]  Fair (50-75%) [   ] Poor (<25%) [   ]    GI Issues:   : ileostomy output 25cc x 24hrs, abdominal wound/fistula output 2350 cc x 24hrs, per cony output 775cc x 24hrs     : ileostomy output 55cc x 24hrs, abdominal wound/fistula output 2025 cc x 24hrs, per cony output 450cc x 24hrs     11/15:  ileostomy output 65cc x 24hrs, abdominal wound/fistula output 875cc x 24hrs, per cony output 775cc x 24hrs    Pain: no pain/discomfort noted    Skin Integrity: abd wound [10.5 x 6.5cm] and fistula with wound manager in place, RLQ ileostomy, perc cony drain, skin tear below ostomy, wound to lower back. wound to R heel, blisters to sacrum. Rudy score 20    Labs:       131<L>  |  101  |  41<H>  ----------------------------<  118<H>  4.9   |  25  |  1.01    Ca    9.1      2023 07:37  Phos  2.9       Mg     2.2         TPro  8.0  /  Alb  2.4<L>  /  TBili  5.9<H>  /  DBili  3.9<H>  /  AST  117<H>  /  ALT  88<H>  /  AlkPhos  164<H>      CAPILLARY BLOOD GLUCOSE          Medications:  MEDICATIONS  (STANDING):  cefepime   IVPB 2000 milliGRAM(s) IV Intermittent every 8 hours  chlorhexidine 0.12% Liquid 15 milliLiter(s) Swish and Spit two times a day  chlorhexidine 2% Cloths 1 Application(s) Topical <User Schedule>  cholestyramine Powder (Sugar-Free) 4 Gram(s) Oral daily  diphenoxylate/atropine 2 Tablet(s) Oral every 6 hours  enoxaparin Injectable 100 milliGRAM(s) SubCutaneous every 12 hours  epoetin matt-epbx (RETACRIT) Injectable 43357 Unit(s) SubCutaneous every 7 days  glucagon  Injectable 1 milliGRAM(s) IntraMuscular once  influenza  Vaccine (HIGH DOSE) 0.7 milliLiter(s) IntraMuscular once  levothyroxine Injectable 40 MICROGram(s) IV Push at bedtime  loperamide 4 milliGRAM(s) Oral every 6 hours  metoprolol tartrate Injectable 5 milliGRAM(s) IV Push every 6 hours  Omegaven 10G/100ml 50 Gram(s) 50 Gram(s) (41.67 mL/Hr) IV Continuous <Continuous>  opium Tincture 6 milliGRAM(s) Oral every 6 hours  pantoprazole  Injectable 40 milliGRAM(s) IV Push daily  Parenteral Nutrition - Adult 1 Each TPN Continuous <Continuous>  Parenteral Nutrition - Adult 1 Each TPN Continuous <Continuous>  ursodiol Suspension 300 milliGRAM(s) Oral every 8 hours  venlafaxine XR. 150 milliGRAM(s) Oral two times a day    MEDICATIONS  (PRN):  LORazepam   Injectable 0.5 milliGRAM(s) IV Push at bedtime PRN Anxiety  ondansetron Injectable 4 milliGRAM(s) IV Push every 8 hours PRN Nausea and/or Vomiting  simethicone 80 milliGRAM(s) Chew daily PRN Gas      Daily Weight in k.2 [2023]  Daily 94.2kg [2023]  Daily 94.2 [15 Nov 2023]  Daily 94.2kg [2023]  Daily 95.8kg [2023]  Daily 94.2kg [2023]  Daily 85.2kg [2023]  Daily 85.2kg [31 Oct 2023]  Daily 85.2kg [24 Oct 2023]  Daily 85.2kg [20 Oct 2023]  Daily 81.9kg [18 Oct 2023]  Daily 85.2kg [16 Oct 2023]  Daily   95.2kg [12 Oct 2023]   Daily 87.7kg [11 Oct 2023]  Daily 87.4kg [09 Oct 2023]  Daily 86.4kg [07 Oct 2023]  Daily 82.5kg [06 Oct 2023]  Daily 82.6kg [4 Oct 2023]   Daily 83.5kg [03 Oct 2023]  Daily 84.5kg [2 Oct 2023]  Daily 87.2kg [1 Oct 2023]  Daily 88.3kg [29 Sep 2023]  Daily 89.9kg [28 Sep 2023]  Daily 87.7kg [24 Sep 2023]  Daily 90.4kg [21 Sep 2023]  Daily 91.4kg [20 Sep 2023]  Daily 86.7kg [18 Sep 2023]  Daily 88.1kg [16 Sep 2023]  Daily 88.9kg [15 Sep 2023]   Daily 89.1 [14 Sep 2023]  Daily 92.9kg [6 Sep 2023]  Daily 91.8kg [27 Aug 2023]  Daily 101kg [9 Aug 2023]       Weight Change: weight trending down throughout admission, now appears to be trending back up. Recommend continue weekly/daily weights for trending & ensuring adequacy of nutrition provision    IBW: 86.4kg   % IBW: 109.0%    Estimated energy needs:   Ideal body weight (86.4kg) used for calculations as pt >100% IBW and BMI <30 per [facility] Standards of Care. Needs estimated for age and adjusted for current clinical status, increased needs for post-op & open abd wound healing    Calories: 8406-8307 kcals based on 30-40 kcals/kg  Protein: 129.6-172.8 g protein based on 1.5-2.0g protein/kg + additional depending on outputs  *Fluid needs per team    Subjective:   78 M w/ Crohn's, AFib/Flutter s/p DCCVs on amiodarone, remote ileocectomy and open appendectomy. Admitted (6/23) for SBO vs Crohns flare, s/p NGT decompression and s/p lap TRE converted to open TRE, SBR x 3, left in discontinuity with abthera vac on (), RTOR for ileocolic resection, small bowel anastomosis, and abdominal wall closure on (), c/b fluid collection s/p IR aspiration of perihepatic fluid on (7/3), c/b wound dehiscence s/p RTOR exlap, washout, ileocolic resection with end ileostomy, blow hole colostomy, red rubber from ileostomy to small bowel anastomosis; vicryl bridging mesh on () transferred to SICU postoperatively for hemodynamic monitoring, with hospital course complicated by periods of AMS most recently on  and associated with oliguria, severe ELVI and hyponatremia, which resolved but stepped back up for to SICU on 9/10 for acute AMS, intermittent hypoglycemia, AFib with RVR. Percutaneous Cholecystostomy placed on  for enlarged GB, PCT capped 10/5.    Chart reviewed. Pt seen at bedside on 5 EA, sleeping, on room air. Remains on TPN & PO diet. Lipids changed to 3x/week  due to cholestasis. Liver enzymes with no significant changes since change [not obtained today], will continue to monitor & adjust recs as indicated. Ongoing large output from fistula, 2350 cc x 24hrs. PO intake remains good. Consider NPO for bowel rest given increasing outputs. Labs reviewed- selenium 79 <L> [], Na 131 <L> [monitor fluid/volume status], BUN 41 <H>, total bilirubin 5.9 <H>, direct bilirubin 3.9 <H>, Alk phos 164 <H>,  <H>, ALT 88 <H>,  <H> []. Meds: on EPO, opium, protonix, lomotil, mylicon, ursodiol, synthroid [administer 30-60 minutes before food; separate at least 4hrs from calcium or iron-containing products or bile acid sequestrants], cholestyramine, imodium, zofran inj. RDN will continue to monitor, reassess, and intervene as appropriate.     Previous Nutrition Diagnosis: Increased Nutrient Needs R/T physiological demands for nutrient AEB post-op wound healing    Active [ X  ]  Resolved [   ]    If resolved, new PES:     Goal: Pt will meet at least 75% of protein & energy needs via most appropriate route for nutrition     Recommendations:  1. c/w TPN via PICC over 18hrs- c/w 361g Dex, 144g AA daily  - continue providing lipids 3x/week [MWF schedule]- 50g Omegaven lipids  - to provide [average] 2017.7 kcals daily, 144g protein, GIR 2.66 mg/kg/min [23.4 kcals/kg, 16.7 non-protein kcals/kg, 1.67g protein/kg IBW]  - to monitor tolerance & hepatic labs, recommend increasing provision to meet needs once tolerance established  - consider cycling TPN over 12-16hrs  - MVI, selenium, zinc in TPN bag  - consider increasing vit C in bag  - provide copper in TPN 3x/week [MWF]-- pending repeat levels  - monitor weights & wound healing, adjust substrates as indicated  2. PO diet per team discretion  - consider temporary NPO due to increase in outputs  - current: Low fat PO diet + 1 LPS BID [200 kcals, 30g protein] + 1 Ensure Max daily [150 kcals, 30g protein]  - ongoing diet education prn  3. Hydration per team  - risk for dehydration with high outputs, monitor  - additional fluids prn  4. Fluids & lytes per team  - consider oral rehydration solution prn  5. Weekly lipid panel   - hold/decrease lipids if TG >400  6. Monitor BMP/Mg/Phos, POC BG  - monitor & replenish lytes outside TPN bag prn  7. Continue close monitoring of clinical course & adjust recommendations prn    Education: ongoing diet education on current provision and recommendations    Risk Level: High [ X  ] Moderate [   ] Low [   ]

## 2023-11-22 NOTE — PROGRESS NOTE ADULT - SUBJECTIVE AND OBJECTIVE BOX
Interval Events:  Patient seen and examined at bedside.      Allergies    penicillin (Angioedema)    Intolerances        Vital Signs Last 24 Hrs  T(C): 36.4 (22 Nov 2023 01:01), Max: 36.5 (21 Nov 2023 09:55)  T(F): 97.6 (22 Nov 2023 01:01), Max: 97.7 (21 Nov 2023 09:55)  HR: 89 (22 Nov 2023 05:00) (89 - 92)  BP: 158/69 (22 Nov 2023 05:00) (113/56 - 159/75)  BP(mean): 107 (22 Nov 2023 05:00) (80 - 108)  RR: 17 (22 Nov 2023 05:00) (15 - 18)  SpO2: 98% (22 Nov 2023 05:00) (95% - 99%)    Parameters below as of 22 Nov 2023 05:00  Patient On (Oxygen Delivery Method): room air        11-21 @ 07:01  -  11-22 @ 07:00  --------------------------------------------------------  IN: 3671.7 mL / OUT: 5675 mL / NET: -2003.3 mL      11-21 @ 07:01  -  11-22 @ 07:00  --------------------------------------------------------  IN: 3671.7 mL / OUT: 5675 mL / NET: -2003.3 mL        Physical Exam:   GENERAL: NAD, resting comfortably in bed,   Neuro: A&OX3 No deficits  HEENT: NCAT, MMM + Icteric  C/V: Normal rate, normal peripheral perfusion, no murmurs  PULM: Nonlabored breathing, no respiratory distress, CTA b/l  ABD: Soft, ND, NT, no rebound tenderness, no guarding, wound manager in place, brown output  EXTREM: WWP, dependent pitting edema, SCDs in place  Skin: no rashes noted  MSK: No joint swelling  Psych: No signs of anxiety or depression      LABS:      CBC Full  -  ( 21 Nov 2023 05:06 )  WBC Count : 12.09 K/uL  RBC Count : 2.66 M/uL  Hemoglobin : 8.5 g/dL  Hematocrit : 27.2 %  Platelet Count - Automated : 137 K/uL  Mean Cell Volume : 102.3 fl  Mean Cell Hemoglobin : 32.0 pg  Mean Cell Hemoglobin Concentration : 31.3 gm/dL  Auto Neutrophil # : x  Auto Lymphocyte # : x  Auto Monocyte # : x  Auto Eosinophil # : x  Auto Basophil # : x  Auto Neutrophil % : x  Auto Lymphocyte % : x  Auto Monocyte % : x  Auto Eosinophil % : x  Auto Basophil % : x    11-21    135  |  102  |  42<H>  ----------------------------<  110<H>  4.5   |  26  |  1.06    Ca    8.8      21 Nov 2023 05:06  Phos  3.8     11-21  Mg     2.2     11-21    TPro  8.0  /  Alb  2.4<L>  /  TBili  5.9<H>  /  DBili  3.9<H>  /  AST  117<H>  /  ALT  88<H>  /  AlkPhos  164<H>  11-21          Urinalysis Basic - ( 21 Nov 2023 05:06 )    Color: x / Appearance: x / SG: x / pH: x  Gluc: 110 mg/dL / Ketone: x  / Bili: x / Urobili: x   Blood: x / Protein: x / Nitrite: x   Leuk Esterase: x / RBC: x / WBC x   Sq Epi: x / Non Sq Epi: x / Bacteria: x              RADIOLOGY & ADDITIONAL STUDIES (The following images were personally reviewed):          A/p:  78M w PMH Crohn's, AFib/Flutter s/p DCCVs on amiodarone, remote ileocectomy and open appendectomy. Admitted (6/23) for SBO vs Crohns flare, s/p NGT decompression and s/p lap converted to open TRE, SBR x 3, left in discontinuity with abthera vac on (6/27), RTOR for ileocolic resection, small bowel anastomosis, and abdominal wall closure on (6/28), c/b fluid collection s/p IR aspiration of perihepatic fluid on (7/3), c/b wound dehiscence s/p RTOR exlap, washout, ileocolic resection with end ileostomy, blow hole colostomy, red rubber from ileostomy to small bowel anastomosis; vicryl bridging mesh on (7/5) transferred to SICU postoperatively for hemodynamic monitoring, with hospital course complicated by periods of severe ELVI and hyponatremia, which resolved but stepped back up for to SICU on (9/10) for acute AMS, intermittent hypoglycemia, AFib with RVR. Percutaneous Cholecystostomy placed on (9/11) for enlarged GB, PCT capped 10/5 and uncapped 10/25 for hyperbilirubinemia--improving after changes to TPN made. Right brachial DVT, left basillic and cephalic superficial thrombus on duplex 11/2, on Lovenox 100 BID--on hold for bloody ostomy output.     NEURO: Hospital course with AMS (resolved), EEG neg. Hx of depression - cont Effexor. Insomnia: Melatonin PRN. Nausea: Zofran PRN. Anxiety: Lorazepam PRN.   HEENT: Cepacol prn  CV: Hx Afib/flutter: s/p DCCV, Amio held for Increased LFT'S. Continue Metoprolol 5q6. Hx HLD: Lipitor held given high LFTs. TTE  (7/18) - PASP 64mmHg, EF 65-70%,  Hx HTN -  holding Norvasc 10qd. Holding Losartan. Cont Lopressor 5 q6. s/p Albumin 25% q8 x3 days. Will f/u I/O and replete as needed.   PULM:  +PNA: COVID negative. RVP negative.  MRSA surveillance swab negative. Cont empiric cefepime for now.   GI/FEN: High protein diet. Cont Ensure max x1/day. Cont TPN - Continue Omegavan QOD to reduce cholestasis  (2.3 L/day volume) for high ostomy and ECF output: Cont Octreotide in TPN, Imodium, Lomotil, Tincture of Opium, Cholestyramine 10/18. Transaminitis of unknown origin, with downtrending LFTs, stable Tbili. s/p Perc Deb on 9/11; MRCP with no obstruction, As per Hepatology started on ursidiol, cont PPI. monitor drainage from fistula, bolus as needed to keep I&O even.     : Voids.   ENDO: Hx hypothyroid: IV Synthroid, TSH wnl on 10/23  ID: chest CT with LADEN with ground glass opacities, afebrile and unimpressive leukocytosis, unclear etiology. will give empiric cefepime for now. (11/15-11/22) Bld Cx's NGTD. Cont Nystatin powder // PREVIOUSLY had C. tertium, Lactobacillis from his IR cx 7/3, and candida albicans, lactobacillus, vanc sensitive E faecium, vanc resistant E gallinarum, vanc resistant E casseliflavus, lactobacillus from his OR cx 7/5. Completed course of abx with Imipenem (9/10-9/15). Imipenem (8/26--) imipenem (6/30-7/12, 7/23-7/30), Dapto (6/30-7/5 and 7/23-7/24). Empiric dapto (8/23-24) and cefepime (8/23-24).   PPX: SCDs, Therapeutic lovenox due to R brachial vein DVT (11/3 - )  HEME: Anemia: continue Epogen. S/p Iron Sucrose 200 qd x 3 days for chronic anemia.  LINES: PIVs, RUE PICC: (11/1-) DC'd: LUE PICC (9/1-11/1 )  WOUNDS/DRAINS: Abdominal wound and fistula with wound manager in place dressing change T & F. RLQ Ostomy. IR perc deb. // DC: L Clay drain.  PT: Ambulate as tolerated   DISPO: SICU due to complicated hospital course. but will do vitals q4hrs (hold overnight),  Interval Events:  Negative 2 L by this am therefore bolused 500cc of NS.   Patient seen and examined at bedside.      Allergies    penicillin (Angioedema)    Intolerances        Vital Signs Last 24 Hrs  T(C): 36.4 (22 Nov 2023 01:01), Max: 36.5 (21 Nov 2023 09:55)  T(F): 97.6 (22 Nov 2023 01:01), Max: 97.7 (21 Nov 2023 09:55)  HR: 89 (22 Nov 2023 05:00) (89 - 92)  BP: 158/69 (22 Nov 2023 05:00) (113/56 - 159/75)  BP(mean): 107 (22 Nov 2023 05:00) (80 - 108)  RR: 17 (22 Nov 2023 05:00) (15 - 18)  SpO2: 98% (22 Nov 2023 05:00) (95% - 99%)    Parameters below as of 22 Nov 2023 05:00  Patient On (Oxygen Delivery Method): room air        11-21 @ 07:01  -  11-22 @ 07:00  --------------------------------------------------------  IN: 3671.7 mL / OUT: 5675 mL / NET: -2003.3 mL      11-21 @ 07:01  -  11-22 @ 07:00  --------------------------------------------------------  IN: 3671.7 mL / OUT: 5675 mL / NET: -2003.3 mL        Physical Exam:   GENERAL: NAD, resting comfortably in bed,   Neuro: A&OX3 No deficits  HEENT: NCAT, MMM + Icteric  C/V: Normal rate, normal peripheral perfusion, no murmurs  PULM: Nonlabored breathing, no respiratory distress, CTA b/l  ABD: Soft, ND, NT, no rebound tenderness, no guarding, wound manager in place with fistula noted+brown output  EXTREM: WWP, 1+ dependent pitting edema in LExt B/L, SCDs in place  Skin: no rashes noted  MSK: No joint swelling  Psych: No signs of anxiety or depression      LABS:      CBC Full  -  ( 21 Nov 2023 05:06 )  WBC Count : 12.09 K/uL  RBC Count : 2.66 M/uL  Hemoglobin : 8.5 g/dL  Hematocrit : 27.2 %  Platelet Count - Automated : 137 K/uL  Mean Cell Volume : 102.3 fl  Mean Cell Hemoglobin : 32.0 pg  Mean Cell Hemoglobin Concentration : 31.3 gm/dL  Auto Neutrophil # : x  Auto Lymphocyte # : x  Auto Monocyte # : x  Auto Eosinophil # : x  Auto Basophil # : x  Auto Neutrophil % : x  Auto Lymphocyte % : x  Auto Monocyte % : x  Auto Eosinophil % : x  Auto Basophil % : x    11-21    135  |  102  |  42<H>  ----------------------------<  110<H>  4.5   |  26  |  1.06    Ca    8.8      21 Nov 2023 05:06  Phos  3.8     11-21  Mg     2.2     11-21    TPro  8.0  /  Alb  2.4<L>  /  TBili  5.9<H>  /  DBili  3.9<H>  /  AST  117<H>  /  ALT  88<H>  /  AlkPhos  164<H>  11-21          Urinalysis Basic - ( 21 Nov 2023 05:06 )    Color: x / Appearance: x / SG: x / pH: x  Gluc: 110 mg/dL / Ketone: x  / Bili: x / Urobili: x   Blood: x / Protein: x / Nitrite: x   Leuk Esterase: x / RBC: x / WBC x   Sq Epi: x / Non Sq Epi: x / Bacteria: x              RADIOLOGY & ADDITIONAL STUDIES (The following images were personally reviewed):          A/p:  78M w PMH Crohn's, AFib/Flutter s/p DCCVs on amiodarone, remote ileocectomy and open appendectomy. Admitted (6/23) for SBO vs Crohns flare, s/p NGT decompression and s/p lap converted to open TRE, SBR x 3, left in discontinuity with abthera vac on (6/27), RTOR for ileocolic resection, small bowel anastomosis, and abdominal wall closure on (6/28), c/b fluid collection s/p IR aspiration of perihepatic fluid on (7/3), c/b wound dehiscence s/p RTOR exlap, washout, ileocolic resection with end ileostomy, blow hole colostomy, red rubber from ileostomy to small bowel anastomosis; vicryl bridging mesh on (7/5) transferred to SICU postoperatively for hemodynamic monitoring, with hospital course complicated by periods of severe ELVI and hyponatremia, which resolved but stepped back up for to SICU on (9/10) for acute AMS, intermittent hypoglycemia, AFib with RVR. Percutaneous Cholecystostomy placed on (9/11) for enlarged GB, PCT capped 10/5 and uncapped 10/25 for hyperbilirubinemia--improving after changes to TPN made. Right brachial DVT, left basillic and cephalic superficial thrombus on duplex 11/2    NEURO: Hospital course with AMS (resolved), EEG neg. Hx of depression - cont Effexor. Insomnia: Melatonin PRN. Nausea: Zofran PRN. Anxiety: Lorazepam PRN.   HEENT: Cepacol prn  CV: Hx Afib/flutter: s/p DCCV, Amio held for Increased LFT'S. Continue Metoprolol 5q6. Hx HLD: Lipitor held given high LFTs. TTE  (7/18) - PASP 64mmHg, EF 65-70%,  Hx HTN -  holding Norvasc 10qd. Holding Losartan. Cont Lopressor 5 q6. s/p Albumin 25% q8 x3 days. Will f/u I/O and replete as needed.   PULM:  +PNA: COVID negative. RVP negative.  MRSA surveillance swab negative. Cont empiric cefepime for now.   GI/FEN: High protein diet. Cont Ensure max x1/day. Cont TPN - Continue Omegavan QOD to reduce cholestasis  (2.3 L/day volume) for high ostomy and ECF output: Cont Octreotide in TPN, Imodium, Lomotil, Tincture of Opium, Cholestyramine 10/18. Transaminitis of unknown origin, with downtrending LFTs, stable Tbili. s/p Perc Deb on 9/11; MRCP with no obstruction, As per Hepatology started on ursodiol, cont PPI. monitor drainage from fistula, bolus as needed to keep I&O even.     : Voids.   ENDO: Hx hypothyroid: IV Synthroid, TSH wnl on 10/23  ID: chest CT with ALDEN with ground glass opacities, afebrile and unimpressive leukocytosis, unclear etiology. will give empiric cefepime for now. (11/15-11/22) Bld Cx's NGTD. Cont Nystatin powder // PREVIOUSLY had C. tertium, Lactobacillis from his IR cx 7/3, and candida albicans, lactobacillus, vanc sensitive E faecium, vanc resistant E gallinarum, vanc resistant E casseliflavus, lactobacillus from his OR cx 7/5. Completed course of abx with Imipenem (9/10-9/15). Imipenem (8/26--) imipenem (6/30-7/12, 7/23-7/30), Dapto (6/30-7/5 and 7/23-7/24). Empiric dapto (8/23-24) and cefepime (8/23-24).   PPX: SCDs, Therapeutic lovenox due to R brachial vein DVT   HEME: Anemia: continue Epogen. S/p Iron Sucrose 200 qd x 3 days for chronic anemia.  LINES: PIVs, RUE PICC: (11/1-) DC'd: LUE PICC (9/1-11/1 )  WOUNDS/DRAINS: Abdominal wound and fistula with wound manager in place dressing change Tuesday and Friday. RLQ Ostomy. IR perc deb. // DC: L Clay drain.  PT: Ambulate as tolerated   DISPO: SICU due to complicated hospital course. but will do vitals q4hrs (hold overnight),

## 2023-11-22 NOTE — PROGRESS NOTE ADULT - SUBJECTIVE AND OBJECTIVE BOX
Pt seen bedside, OOB sitting in chair. No complaints.   Percutaneous cholecystostomy tube with 775 cc bilious output past 24 hr, previously 875 cc/24h. Flushed easily with 3 cc NS. Dressings CDI.

## 2023-11-22 NOTE — PROGRESS NOTE ADULT - SUBJECTIVE AND OBJECTIVE BOX
SUBJECTIVE:  Patient seen and examined at bedside today   No acute overnight events; resting comfortably in bed this AM   No abdominal complaints   Voiding spontaneously and having enteric function       MEDICATIONS  (STANDING):  cefepime   IVPB 2000 milliGRAM(s) IV Intermittent every 8 hours  chlorhexidine 0.12% Liquid 15 milliLiter(s) Swish and Spit two times a day  chlorhexidine 2% Cloths 1 Application(s) Topical <User Schedule>  cholestyramine Powder (Sugar-Free) 4 Gram(s) Oral daily  diphenoxylate/atropine 2 Tablet(s) Oral every 6 hours  enoxaparin Injectable 100 milliGRAM(s) SubCutaneous every 12 hours  epoetin matt-epbx (RETACRIT) Injectable 24410 Unit(s) SubCutaneous every 7 days  fluticasone propionate 50 MICROgram(s)/spray Nasal Spray 1 Spray(s) Both Nostrils two times a day  glucagon  Injectable 1 milliGRAM(s) IntraMuscular once  influenza  Vaccine (HIGH DOSE) 0.7 milliLiter(s) IntraMuscular once  levothyroxine Injectable 40 MICROGram(s) IV Push at bedtime  loperamide 4 milliGRAM(s) Oral every 6 hours  metoprolol tartrate Injectable 5 milliGRAM(s) IV Push every 6 hours  opium Tincture 6 milliGRAM(s) Oral every 6 hours  pantoprazole  Injectable 40 milliGRAM(s) IV Push daily  Parenteral Nutrition - Adult 1 Each TPN Continuous <Continuous>  ursodiol Suspension 300 milliGRAM(s) Oral every 8 hours  venlafaxine XR. 150 milliGRAM(s) Oral two times a day    MEDICATIONS  (PRN):  LORazepam   Injectable 0.5 milliGRAM(s) IV Push at bedtime PRN Anxiety  ondansetron Injectable 4 milliGRAM(s) IV Push every 8 hours PRN Nausea and/or Vomiting  simethicone 80 milliGRAM(s) Chew daily PRN Gas      Vital Signs Last 24 Hrs  T(C): 36.4 (22 Nov 2023 01:01), Max: 36.5 (21 Nov 2023 09:55)  T(F): 97.6 (22 Nov 2023 01:01), Max: 97.7 (21 Nov 2023 09:55)  HR: 89 (22 Nov 2023 05:00) (89 - 92)  BP: 158/69 (22 Nov 2023 05:00) (113/56 - 159/75)  BP(mean): 107 (22 Nov 2023 05:00) (80 - 108)  RR: 17 (22 Nov 2023 05:00) (15 - 18)  SpO2: 98% (22 Nov 2023 05:00) (95% - 99%)    Parameters below as of 22 Nov 2023 05:00  Patient On (Oxygen Delivery Method): room air      Physical Exam:  GENERAL: NAD, resting comfortably in bed, AxOx3  HEENT: NCAT, MMM  C/V: Normal rate, normal peripheral perfusion, no murmurs  PULM: Nonlabored breathing, no respiratory distress, CTA b/l  ABD: Soft, ND, NT, no rebound tenderness, no guarding, wound manager in place, brown output  EXTREM: WWP, dependent pitting edema, SCDs in place  NEURO: No focal deficits       I&O's Summary    20 Nov 2023 07:01  -  21 Nov 2023 07:00  --------------------------------------------------------  IN: 4033.4 mL / OUT: 3865 mL / NET: 168.4 mL    21 Nov 2023 07:01  -  22 Nov 2023 06:14  --------------------------------------------------------  IN: 3671.7 mL / OUT: 5675 mL / NET: -2003.3 mL        LABS:                        8.5    12.09 )-----------( 137      ( 21 Nov 2023 05:06 )             27.2     11-21    135  |  102  |  42<H>  ----------------------------<  110<H>  4.5   |  26  |  1.06    Ca    8.8      21 Nov 2023 05:06  Phos  3.8     11-21  Mg     2.2     11-21    TPro  8.0  /  Alb  2.4<L>  /  TBili  5.9<H>  /  DBili  3.9<H>  /  AST  117<H>  /  ALT  88<H>  /  AlkPhos  164<H>  11-21      Urinalysis Basic - ( 21 Nov 2023 05:06 )    Color: x / Appearance: x / SG: x / pH: x  Gluc: 110 mg/dL / Ketone: x  / Bili: x / Urobili: x   Blood: x / Protein: x / Nitrite: x   Leuk Esterase: x / RBC: x / WBC x   Sq Epi: x / Non Sq Epi: x / Bacteria: x      CAPILLARY BLOOD GLUCOSE        LIVER FUNCTIONS - ( 21 Nov 2023 05:06 )  Alb: 2.4 g/dL / Pro: 8.0 g/dL / ALK PHOS: 164 U/L / ALT: 88 U/L / AST: 117 U/L / GGT: x             RADIOLOGY & ADDITIONAL STUDIES:

## 2023-11-22 NOTE — PROGRESS NOTE ADULT - ASSESSMENT
78M w PMH Crohn's, AFib/Flutter s/p DCCVs on amiodarone, remote ileocectomy and open appendectomy. Admitted (6/23) for SBO vs Crohns flare, s/p NGT decompression and s/p lap converted to open TRE, SBR x 3, left in discontinuity with abthera vac on (6/27), RTOR for ileocolic resection, small bowel anastomosis, and abdominal wall closure on (6/28), c/b fluid collection s/p IR aspiration of perihepatic fluid on (7/3), c/b wound dehiscence s/p RTOR exlap, washout, ileocolic resection with end ileostomy, blow hole colostomy, red rubber from ileostomy to small bowel anastomosis; vicryl bridging mesh on (7/5) transferred to SICU postoperatively for hemodynamic monitoring, with hospital course complicated by periods of severe ELVI and hyponatremia, which resolved but stepped back up for to SICU on (9/10) for acute AMS, intermittent hypoglycemia, AFib with RVR. Percutaneous Cholecystostomy placed on (9/11) for enlarged GB, PCT capped 10/5 and uncapped 10/25 for hyperbilirubinemia--improving after changes to TPN made. Right brachial DVT, left basillic and cephalic superficial thrombus on duplex 11/2, on Lovenox 100 BID--on hold for bloody ostomy output. Persistent hyperbilirubinemia and mild transaminitis. Mild, improving leukocytosis. Decreased UOP. Slightly up-trended Cr stable today.     Plan:   - Continue chest PT/IS/OOB   - Appreciate Hepatology input - trend LFT and avoid hepatotoxins    - Monitor Cr tend and UOP   - Low fat diet/TPN    - SCDs & SQL   - Appreciate wound care team care   - Appreciate SICU care   - Team 4c following 78M w PMH Crohn's, AFib/Flutter s/p DCCVs on amiodarone, remote ileocectomy and open appendectomy. Admitted (6/23) for SBO vs Crohns flare, s/p NGT decompression and s/p lap converted to open TRE, SBR x 3, left in discontinuity with abthera vac on (6/27), RTOR for ileocolic resection, small bowel anastomosis, and abdominal wall closure on (6/28), c/b fluid collection s/p IR aspiration of perihepatic fluid on (7/3), c/b wound dehiscence s/p RTOR exlap, washout, ileocolic resection with end ileostomy, blow hole colostomy, red rubber from ileostomy to small bowel anastomosis; vicryl bridging mesh on (7/5) transferred to SICU postoperatively for hemodynamic monitoring, with hospital course complicated by periods of severe ELVI and hyponatremia, which resolved but stepped back up for to SICU on (9/10) for acute AMS, intermittent hypoglycemia, AFib with RVR. Percutaneous Cholecystostomy placed on (9/11) for enlarged GB, PCT capped 10/5 and uncapped 10/25 for hyperbilirubinemia--improving after changes to TPN made. Right brachial DVT, left basilic and cephalic superficial thrombus on duplex 11/2, on Lovenox 100 BID--held for bloody stoma output but restarted after resolution/control of superficial bleed. Persistent hyperbilirubinemia and mild transaminitis. Mild persistent leukocytosis. Improved creatinine and urine output     Plan:   - Continue chest PT/IS/OOB   - Appreciate Hepatology input - trend LFT and avoid hepatotoxins    - Monitor Cr tend and UOP   - Low fat diet/TPN    - SCDs & SQL   - Appreciate wound care team care   - Appreciate SICU care   - Team 4c following

## 2023-11-22 NOTE — PROGRESS NOTE ADULT - ASSESSMENT
78M with history of Crohn's, AFib/Flutter s/p DCCVs on amiodarone, remote ileocectomy and open appendectomy. Admitted (6/23) for SBO vs Crohns flare, s/p NGT decompression and s/p lap TRE converted to open TRE, SBR x 3, left in discontinuity with abthera vac on (6/27), RTOR for ileocolic resection, small bowel anastomosis, and abdominal wall closure on (6/28), c/b fluid collection s/p IR aspiration of perihepatic fluid on (7/3), c/b wound dehiscence s/p RTOR exlap, washout, ileocolic resection with end ileostomy, blow hole colostomy, red rubber from ileostomy to small bowel anastomosis; vicryl bridging mesh on (7/5) transferred to SICU postoperatively for hemodynamic monitoring, with hospital course complicated by periods of AMS most recently on 9/1 and associated with oliguria, severe ELVI and hyponatremia, which resolved but stepped back up for to SICU on 9/10 for acute AMS, intermittent hypoglycemia, AFib with RVR. Percutaneous Cholecystostomy placed on 9/11 for enlarged GB, capped 10/5, and connected to drainage bag 10/25.    -Continue to monitor output.   -IR to continue to follow.

## 2023-11-23 NOTE — PROGRESS NOTE ADULT - ASSESSMENT
78M w PMH Crohn's, AFib/Flutter s/p DCCVs on amiodarone, remote ileocectomy and open appendectomy. Admitted (6/23) for SBO vs Crohns flare, s/p NGT decompression and s/p lap converted to open TRE, SBR x 3, left in discontinuity with abthera vac on (6/27), RTOR for ileocolic resection, small bowel anastomosis, and abdominal wall closure on (6/28), c/b fluid collection s/p IR aspiration of perihepatic fluid on (7/3), c/b wound dehiscence s/p RTOR exlap, washout, ileocolic resection with end ileostomy, blow hole colostomy, red rubber from ileostomy to small bowel anastomosis; vicryl bridging mesh on (7/5) transferred to SICU postoperatively for hemodynamic monitoring, with hospital course complicated by periods of severe ELVI and hyponatremia, which resolved but stepped back up for to SICU on (9/10) for acute AMS, intermittent hypoglycemia, AFib with RVR. Percutaneous Cholecystostomy placed on (9/11) for enlarged GB, PCT capped 10/5 and uncapped 10/25 for hyperbilirubinemia--improving after changes to TPN made. Right brachial DVT, left basilic and cephalic superficial thrombus on duplex 11/2, on Lovenox 100 BID--held for bloody stoma output but restarted after resolution/control of superficial bleed. Persistent hyperbilirubinemia and mild transaminitis. Mild persistent leukocytosis. Improved creatinine and urine output     Plan:   - Continue chest PT/IS/OOB   - Appreciate Hepatology input - trend LFT and avoid hepatotoxins    - Monitor Cr tend and UOP   - Low fat diet/TPN    - SCDs & SQL   - Appreciate wound care team care   - Appreciate SICU care   - Team 5 following

## 2023-11-23 NOTE — PROGRESS NOTE ADULT - SUBJECTIVE AND OBJECTIVE BOX
11/23: Na 128, UA/Ulytes, restarted Cefepime, Flagyl??  O/N: Woke up "feeling septic," Febrile to 100.9F, given tylenol. Very dry MM, 1L LRx1.     POST-OP DAY: X s/p      SUBJECTIVE: Patient seen and examined bedside by Surgical resident. States that he was not feeling well last night. Febrile feeling and not comfortable. No abdominal complaints. Voiding spontaneously and having enteric function     cefepime   IVPB 2000 milliGRAM(s) IV Intermittent every 8 hours  enoxaparin Injectable 100 milliGRAM(s) SubCutaneous every 12 hours  metoprolol tartrate Injectable 5 milliGRAM(s) IV Push every 6 hours    MEDICATIONS  (PRN):  LORazepam   Injectable 0.5 milliGRAM(s) IV Push at bedtime PRN Anxiety  ondansetron Injectable 4 milliGRAM(s) IV Push every 8 hours PRN Nausea and/or Vomiting  simethicone 80 milliGRAM(s) Chew daily PRN Gas      I&O's Detail    22 Nov 2023 07:01  -  23 Nov 2023 07:00  --------------------------------------------------------  IN:    freetext medication - Infusion: 542.1 mL    IV PiggyBack: 100 mL    Lactated Ringers Bolus: 1000 mL    Oral Fluid: 1230 mL    Sodium Chloride 0.9% Bolus: 500 mL    TPN (Total Parenteral Nutrition): 2165 mL  Total IN: 5537.1 mL    OUT:    Drain (mL): 35 mL    Drain (mL): 650 mL    Drain (mL): 1700 mL    Voided (mL): 1850 mL  Total OUT: 4235 mL    Total NET: 1302.1 mL      23 Nov 2023 07:01  -  23 Nov 2023 09:24  --------------------------------------------------------  IN:    freetext medication - Infusion: 41.7 mL    IV PiggyBack: 50 mL    TPN (Total Parenteral Nutrition): 258 mL  Total IN: 349.7 mL    OUT:    Voided (mL): 100 mL  Total OUT: 100 mL    Total NET: 249.7 mL          Vital Signs Last 24 Hrs  T(C): 37.6 (23 Nov 2023 07:30), Max: 38.3 (23 Nov 2023 05:00)  T(F): 99.6 (23 Nov 2023 07:30), Max: 100.9 (23 Nov 2023 05:00)  HR: 93 (23 Nov 2023 09:10) (87 - 93)  BP: 122/59 (23 Nov 2023 09:10) (122/59 - 181/79)  BP(mean): 85 (23 Nov 2023 09:10) (85 - 114)  RR: 21 (23 Nov 2023 09:10) (20 - 30)  SpO2: 93% (23 Nov 2023 09:10) (92% - 98%)    Parameters below as of 23 Nov 2023 09:10  Patient On (Oxygen Delivery Method): room air        Physical Exam:  GENERAL: NAD, resting comfortably in bed, AxOx3  HEENT: NCAT, MMM  C/V: Normal rate, normal peripheral perfusion, no murmurs  PULM: Nonlabored breathing, no respiratory distress, CTA b/l  ABD: Soft, ND, NT, no rebound tenderness, no guarding, wound manager in place, brown output  EXTREM: WWP, dependent pitting edema, SCDs in place  NEURO: No focal deficits       LABS:                        8.8    12.34 )-----------( 152      ( 23 Nov 2023 04:55 )             27.8     11-23    128<L>  |  97  |  38<H>  ----------------------------<  136<H>  4.5   |  22  |  1.10    Ca    8.9      23 Nov 2023 04:55  Phos  2.4     11-23  Mg     2.0     11-23    TPro  8.5<H>  /  Alb  2.5<L>  /  TBili  6.9<H>  /  DBili  4.7<H>  /  AST  117<H>  /  ALT  91<H>  /  AlkPhos  176<H>  11-23    PT/INR - ( 23 Nov 2023 04:55 )   PT: 14.7 sec;   INR: 1.30          PTT - ( 23 Nov 2023 04:55 )  PTT:43.1 sec  Urinalysis Basic - ( 23 Nov 2023 04:55 )    Color: x / Appearance: x / SG: x / pH: x  Gluc: 136 mg/dL / Ketone: x  / Bili: x / Urobili: x   Blood: x / Protein: x / Nitrite: x   Leuk Esterase: x / RBC: x / WBC x   Sq Epi: x / Non Sq Epi: x / Bacteria: x        RADIOLOGY & ADDITIONAL STUDIES:

## 2023-11-23 NOTE — PROGRESS NOTE ADULT - ASSESSMENT
Cultures done Source of infection unclear  Antibiotic coverage expanded  If no change CT scan to be done

## 2023-11-23 NOTE — CHART NOTE - NSCHARTNOTEFT_GEN_A_CORE
Infectious Diseases Anti-infective Approval Note    Medication: Cefepime  Dose*: 2g  Route: IV  Frequency: q8h  Duration**: 3 days    *Dose may be adjusted as needed for alterations in renal function.    **Reflects duration of approval and not necessarily duration of therapy     THIS IS NOT AN INFECTIOUS DISEASES CONSULTATION

## 2023-11-23 NOTE — PROGRESS NOTE ADULT - SUBJECTIVE AND OBJECTIVE BOX
S: pt says he feels tired, no energy.   No new issues/events overnight, no new med c/o    O: ICU Vital Signs Last 24 Hrs  T(F): 99.6 (11-23-23 @ 07:30), Max: 100.9 (11-23-23 @ 05:00)  HR: 93 (11-23-23 @ 09:10) (87 - 93)  BP: 122/59 (11-23-23 @ 09:10) (122/59 - 181/79)  BP(mean): 85 (11-23-23 @ 09:10) (85 - 114)  ABP: --  RR: 21 (11-23-23 @ 09:10) (20 - 30)  SpO2: 93% (11-23-23 @ 09:10) (92% - 98%)    PHYSICAL EXAM:   Neurological: AAOx3, CNII-XII intact,  strength 5/5 b/l  ENT: mucus membrane moist  Cardiovascular: RRR  Respiratory: CTA  Gastrointestinal: soft, NT, ND, right perc cony with bilious drainage. fistula with wound manager with grayish colored liquid and some formed content. wound bed pink, no bleeding. right sided ostomy with red rubber intact.   Extremities: warm, no dependent edema, right arm PICC site no pus,  no erythema.   Vascular: no cyanosis/erythema  Skin: no rashes  MSK: no joint swelling.     LABS:    11-23    128<L>  |  97  |  38<H>  ----------------------------<  136<H>  4.5   |  22  |  1.10    Ca    8.9      23 Nov 2023 04:55  Phos  2.4     11-23  Mg     2.0     11-23    TPro  8.5<H>  /  Alb  2.5<L>  /  TBili  6.9<H>  /  DBili  4.7<H>  /  AST  117<H>  /  ALT  91<H>  /  AlkPhos  176<H>  11-23  LIVER FUNCTIONS - ( 23 Nov 2023 04:55 )  Alb: 2.5 g/dL / Pro: 8.5 g/dL / ALK PHOS: 176 U/L / ALT: 91 U/L / AST: 117 U/L / GGT: x                               8.8    12.34 )-----------( 152      ( 23 Nov 2023 04:55 )             27.8   PT/INR - ( 23 Nov 2023 04:55 )   PT: 14.7 sec;   INR: 1.30          PTT - ( 23 Nov 2023 04:55 )  PTT:43.1 sec  Urinalysis Basic - ( 23 Nov 2023 10:52 )    Color: Dark Yellow / Appearance: Turbid / SG: >1.030 / pH: x  Gluc: x / Ketone: Negative mg/dL  / Bili: Moderate / Urobili: 1.0 mg/dL   Blood: x / Protein: 100 mg/dL / Nitrite: Negative   Leuk Esterase: Trace / RBC: x / WBC x   Sq Epi: x / Non Sq Epi: x / Bacteria: x    CAPILLARY BLOOD GLUCOSE        MEDICATIONS  (STANDING):  cefepime   IVPB 2000 milliGRAM(s) IV Intermittent every 8 hours  chlorhexidine 0.12% Liquid 15 milliLiter(s) Swish and Spit two times a day  chlorhexidine 2% Cloths 1 Application(s) Topical <User Schedule>  cholestyramine Powder (Sugar-Free) 4 Gram(s) Oral daily  diphenoxylate/atropine 2 Tablet(s) Oral every 6 hours  enoxaparin Injectable 100 milliGRAM(s) SubCutaneous every 12 hours  epoetin matt-epbx (RETACRIT) Injectable 73490 Unit(s) SubCutaneous every 7 days  glucagon  Injectable 1 milliGRAM(s) IntraMuscular once  influenza  Vaccine (HIGH DOSE) 0.7 milliLiter(s) IntraMuscular once  levothyroxine Injectable 40 MICROGram(s) IV Push at bedtime  loperamide 4 milliGRAM(s) Oral every 6 hours  metoprolol tartrate Injectable 5 milliGRAM(s) IV Push every 6 hours  metroNIDAZOLE  IVPB 500 milliGRAM(s) IV Intermittent every 8 hours  metroNIDAZOLE  IVPB      Omegaven 10G/100ml 50 Gram(s) 50 Gram(s) (41.67 mL/Hr) IV Continuous <Continuous>  opium Tincture 6 milliGRAM(s) Oral every 6 hours  pantoprazole  Injectable 40 milliGRAM(s) IV Push daily  Parenteral Nutrition - Adult 1 Each TPN Continuous <Continuous>  Parenteral Nutrition - Adult 1 Each TPN Continuous <Continuous>  ursodiol Suspension 300 milliGRAM(s) Oral every 8 hours  venlafaxine XR. 150 milliGRAM(s) Oral two times a day    MEDICATIONS  (PRN):  LORazepam   Injectable 0.5 milliGRAM(s) IV Push at bedtime PRN Anxiety  ondansetron Injectable 4 milliGRAM(s) IV Push every 8 hours PRN Nausea and/or Vomiting  simethicone 80 milliGRAM(s) Chew daily PRN Gas      Poole:	  [x ] None	[ ] Daily Poole Order Placed	   Indication:	  [ ] Strict I and O's    [ ] Obstruction     [ ] Incontinence + Stage 3 or 4 Decubitus  Central Line:  [ ] None	   [ x]  Medication / TPN Administration     [ ] No Peripheral IV

## 2023-11-23 NOTE — PROGRESS NOTE ADULT - ASSESSMENT
78M w PMH Crohn's, AFib/Flutter s/p DCCVs on amiodarone, remote ileocectomy and open appendectomy. Admitted (6/23) for SBO vs Crohns flare, s/p NGT decompression and s/p lap converted to open TRE, SBR x 3, left in discontinuity with abthera vac on (6/27), RTOR for ileocolic resection, small bowel anastomosis, and abdominal wall closure on (6/28), c/b fluid collection s/p IR aspiration of perihepatic fluid on (7/3), c/b wound dehiscence s/p RTOR exlap, washout, ileocolic resection with end ileostomy, blow hole colostomy, red rubber from ileostomy to small bowel anastomosis; vicryl bridging mesh on (7/5) transferred to SICU postoperatively for hemodynamic monitoring, with hospital course complicated by periods of severe ELVI and hyponatremia, which resolved but stepped back up for to SICU on (9/10) for acute AMS, intermittent hypoglycemia, AFib with RVR. Percutaneous Cholecystostomy placed on (9/11) for enlarged GB, PCT capped 10/5 and uncapped 10/25 for hyperbilirubinemia, Right brachial DVT, left basillic and cephalic superficial thrombus on duplex 11/2, CT 11/14 with ALDEN ground glass opacity of unclear etiology, completed empiric 7day cefepime course 11/22, rising T-bili of unclear etilogy, fever w/T100.9 11/23 night of unclear origin.      NEURO: initial hospital course with AMS (resolved), EEG neg. Hx of depression - cont Effexor. Insomnia: Melatonin PRN. Nausea: Zofran PRN. Anxiety: Lorazepam PRN.   HEENT: Cepacol prn  CV: Hx Afib/flutter: s/p DCCV, Amio held for Increased LFT'S. Continue Metoprolol 5q6. Hx HLD: Lipitor held given high LFTs. TTE  (7/18) - PASP 64mmHg, EF 65-70%,  Hx HTN -  BP ok without Norvasc and Losartan.   PULM: CT from 11/14 with ALDEN ground glass opacity of unclear etiology. COVID negative. RVP negative.  MRSA surveillance swab negative. completed 7d empiric cefepime to cover for potential PNA.   GI/FEN: High protein diet. Cont Ensure max x1/day. Cont TPN - Continue Omegavan MWF to for cholestasis, high output EC fistula: Cont Octreotide in TPN, Imodium, Lomotil, Tincture of Opium, Cholestyramine 10/18. Transaminitis elevated T bili of unknown origin,  s/p Perc Deb on 9/11; MRCP 10/30 no obstruction, seen by Hepatology started on ursodiol, cont PPI. monitor drainage from fistula, bolus as needed to keep I&O even.   : Voids.   ENDO: Hx hypothyroid: IV Synthroid, TSH wnl on 10/23  ID: chest CT with ALDEN with ground glass opacities, unclear etiology. completed empiric 7days cefepime (11/15-11/22), but now with Temp 100.9 overnight, bld cx repeated, sending UA, CXR no new infiltrate. will extend cefepime course for a few more days pending cultures. add flagyl to cover for anaerobic organisms from intraabdominal or biliary source given concurrent elevated T-bili, send perc deb culture. recent MRSA swab negative, RVP panel negative, COVID negative this past week. Cont Nystatin powder // PREVIOUSLY had C. tertium, Lactobacillis from his IR cx 7/3, and candida albicans, lactobacillus, vanc sensitive E faecium, vanc resistant E gallinarum, vanc resistant E casseliflavus, lactobacillus from his OR cx 7/5. Completed course of abx with Imipenem (9/10-9/15). Imipenem (8/26--) imipenem (6/30-7/12, 7/23-7/30), Dapto (6/30-7/5 and 7/23-7/24). Empiric dapto (8/23-24) and cefepime (8/23-24).   PPX: SCDs, Therapeutic lovenox due to R brachial vein DVT   HEME: Anemia: continue Epogen weekly. S/p Iron Sucrose 200 qd x 3 days for chronic anemia.  LINES: PIVs, RUE PICC: (11/1-) DC'd: LUE PICC (9/1-11/1 )  WOUNDS/DRAINS: Abdominal wound and fistula with wound manager in place dressing change Tuesday and Friday. RLQ Ostomy. IR perc deb. // DC: L Clay drain.  PT: Ambulate as tolerated   DISPO: SICU due to complicated hospital course. but will do vitals q4hrs (hold overnight),     78M w PMH Crohn's, AFib/Flutter s/p DCCVs on amiodarone, remote ileocectomy and open appendectomy. Admitted (6/23) for SBO vs Crohns flare, s/p NGT decompression and s/p lap converted to open TRE, SBR x 3, left in discontinuity with abthera vac on (6/27), RTOR for ileocolic resection, small bowel anastomosis, and abdominal wall closure on (6/28), c/b fluid collection s/p IR aspiration of perihepatic fluid on (7/3), c/b wound dehiscence s/p RTOR exlap, washout, ileocolic resection with end ileostomy, blow hole colostomy, red rubber from ileostomy to small bowel anastomosis; vicryl bridging mesh on (7/5) transferred to SICU postoperatively for hemodynamic monitoring, with hospital course complicated by periods of severe ELVI and hyponatremia, which resolved but stepped back up for to SICU on (9/10) for acute AMS, intermittent hypoglycemia, AFib with RVR. Percutaneous Cholecystostomy placed on (9/11) for enlarged GB, PCT capped 10/5 and uncapped 10/25 for hyperbilirubinemia, Right brachial DVT, left basillic and cephalic superficial thrombus on duplex 11/2, CT 11/14 with ALDEN ground glass opacity of unclear etiology, completed empiric 7day cefepime course 11/22, rising T-bili of unclear etilogy, fever w/T100.9 11/23 night of unclear origin.      NEURO: initial hospital course with AMS (resolved), EEG neg. Hx of depression - cont Effexor. Insomnia: Melatonin PRN. Nausea: Zofran PRN. Anxiety: Lorazepam PRN.   HEENT: Cepacol prn  CV: Hx Afib/flutter: s/p DCCV, Amio held for Increased LFT'S. Continue Metoprolol 5q6. Hx HLD: Lipitor held given high LFTs. TTE  (7/18) - PASP 64mmHg, EF 65-70%,  Hx HTN -  BP ok without Norvasc and Losartan.   PULM: CT from 11/14 with ALDEN ground glass opacity of unclear etiology. COVID negative. RVP negative.  MRSA surveillance swab negative. completed 7d empiric cefepime to cover for potential PNA.   GI/FEN: High protein diet. Cont Ensure max x1/day. Cont TPN - Continue Omegavan MWF to for cholestasis, high output EC fistula: Cont Octreotide in TPN, Imodium, Lomotil, Tincture of Opium, Cholestyramine 10/18. Transaminitis elevated T bili of unknown origin,  s/p Perc Deb on 9/11; MRCP 10/30 no obstruction, seen by Hepatology started on ursodiol, cont PPI. monitor drainage from fistula, bolus as needed to keep I&O even.   : Voids.   ENDO: Hx hypothyroid: IV Synthroid, TSH wnl on 10/23  ID: chest CT with ALDEN with ground glass opacities, unclear etiology. completed empiric 7days cefepime (11/15-11/22), but now with Temp 100.9 overnight, bld cx repeated, UA negative, CXR no new infiltrate. will extend cefepime course for a few more days pending cultures. add flagyl to cover for anaerobic organisms from intraabdominal or biliary source given concurrent elevated T-bili, send perc deb culture. recent MRSA swab negative, RVP panel negative, COVID negative this past week. Cont Nystatin powder // PREVIOUSLY had C. tertium, Lactobacillis from his IR cx 7/3, and candida albicans, lactobacillus, vanc sensitive E faecium, vanc resistant E gallinarum, vanc resistant E casseliflavus, lactobacillus from his OR cx 7/5. Completed course of abx with Imipenem (9/10-9/15). Imipenem (8/26--) imipenem (6/30-7/12, 7/23-7/30), Dapto (6/30-7/5 and 7/23-7/24). Empiric dapto (8/23-24) and cefepime (8/23-24).   PPX: SCDs, Therapeutic lovenox due to R brachial vein DVT   HEME: Anemia: continue Epogen weekly. S/p Iron Sucrose 200 qd x 3 days for chronic anemia.  LINES: PIVs, RUE PICC: (11/1-) DC'd: LUE PICC (9/1-11/1 )  WOUNDS/DRAINS: Abdominal wound and fistula with wound manager in place dressing change Tuesday and Friday. RLQ Ostomy. IR perc deb. // DC: L Clay drain.  PT: Ambulate as tolerated   DISPO: SICU due to complicated hospital course. but will do vitals q4hrs (hold overnight),

## 2023-11-24 NOTE — PROGRESS NOTE ADULT - SUBJECTIVE AND OBJECTIVE BOX
SUBJECTIVE:  Flatus: [ ] YES [ ] NO             Bowel Movement: [ ] YES [ ] NO  Pain (0-10):            Pain Control Adequate: [ ] YES [ ] NO  Nausea: [ ] YES [ ] NO            Vomiting: [ ] YES [ ] NO  Diarrhea: [ ] YES [ ] NO         Constipation: [ ] YES [ ] NO     Chest Pain: [ ] YES [ ] NO    SOB:  [ ] YES [ ] NO    MEDICATIONS  (STANDING):  cefepime   IVPB 2000 milliGRAM(s) IV Intermittent every 8 hours  chlorhexidine 0.12% Liquid 15 milliLiter(s) Swish and Spit two times a day  chlorhexidine 2% Cloths 1 Application(s) Topical <User Schedule>  cholestyramine Powder (Sugar-Free) 4 Gram(s) Oral daily  diphenoxylate/atropine 2 Tablet(s) Oral every 6 hours  enoxaparin Injectable 100 milliGRAM(s) SubCutaneous every 12 hours  epoetin matt-epbx (RETACRIT) Injectable 67508 Unit(s) SubCutaneous every 7 days  glucagon  Injectable 1 milliGRAM(s) IntraMuscular once  influenza  Vaccine (HIGH DOSE) 0.7 milliLiter(s) IntraMuscular once  levothyroxine Injectable 40 MICROGram(s) IV Push at bedtime  loperamide 4 milliGRAM(s) Oral every 6 hours  metoprolol tartrate Injectable 5 milliGRAM(s) IV Push every 6 hours  metroNIDAZOLE  IVPB 500 milliGRAM(s) IV Intermittent every 8 hours  metroNIDAZOLE  IVPB      Omegaven 10G/100ml 50 Gram(s) 50 Gram(s) (41.67 mL/Hr) IV Continuous <Continuous>  opium Tincture 6 milliGRAM(s) Oral every 6 hours  pantoprazole  Injectable 40 milliGRAM(s) IV Push daily  Parenteral Nutrition - Adult 1 Each TPN Continuous <Continuous>  ursodiol Suspension 300 milliGRAM(s) Oral every 8 hours  venlafaxine XR. 150 milliGRAM(s) Oral two times a day    MEDICATIONS  (PRN):  LORazepam   Injectable 0.5 milliGRAM(s) IV Push at bedtime PRN Anxiety  ondansetron Injectable 4 milliGRAM(s) IV Push every 8 hours PRN Nausea and/or Vomiting  simethicone 80 milliGRAM(s) Chew daily PRN Gas      Vital Signs Last 24 Hrs  T(C): 37.4 (24 Nov 2023 05:00), Max: 40.4 (23 Nov 2023 23:38)  T(F): 99.3 (24 Nov 2023 05:00), Max: 104.7 (23 Nov 2023 23:38)  HR: 93 (24 Nov 2023 07:00) (92 - 132)  BP: 93/51 (24 Nov 2023 07:00) (85/48 - 227/96)  BP(mean): 69 (24 Nov 2023 07:00) (62 - 157)  RR: 19 (24 Nov 2023 07:00) (18 - 40)  SpO2: 98% (24 Nov 2023 07:00) (90% - 100%)    Parameters below as of 24 Nov 2023 07:00  Patient On (Oxygen Delivery Method): nasal cannula  O2 Flow (L/min): 2      Physical Exam:  General: NAD, resting comfortably in bed  Pulmonary: Nonlabored breathing, no respiratory distress  Cardiovascular: NSR  Abdominal: soft, NT/ND  Extremities: WWP, normal strength  Neuro: A/O x 3, CNs II-XII grossly intact, no focal deficits, normal motor/sensation  Pulses: palpable distal pulses    I&O's Summary    23 Nov 2023 07:01  -  24 Nov 2023 07:00  --------------------------------------------------------  IN: 4798.7 mL / OUT: 3845 mL / NET: 953.7 mL        LABS:                        8.9    12.30 )-----------( 132      ( 24 Nov 2023 05:30 )             27.6     11-24    134<L>  |  101  |  44<H>  ----------------------------<  92  4.9   |  24  |  1.36<H>    Ca    8.9      24 Nov 2023 05:30  Phos  3.8     11-24  Mg     2.2     11-24    TPro  8.2  /  Alb  2.5<L>  /  TBili  6.9<H>  /  DBili  4.7<H>  /  AST  117<H>  /  ALT  91<H>  /  AlkPhos  176<H>  11-23    PT/INR - ( 23 Nov 2023 04:55 )   PT: 14.7 sec;   INR: 1.30          PTT - ( 23 Nov 2023 04:55 )  PTT:43.1 sec  Urinalysis Basic - ( 24 Nov 2023 05:30 )    Color: x / Appearance: x / SG: x / pH: x  Gluc: 92 mg/dL / Ketone: x  / Bili: x / Urobili: x   Blood: x / Protein: x / Nitrite: x   Leuk Esterase: x / RBC: x / WBC x   Sq Epi: x / Non Sq Epi: x / Bacteria: x      CAPILLARY BLOOD GLUCOSE        LIVER FUNCTIONS - ( 23 Nov 2023 04:55 )  Alb: 2.5 g/dL / Pro: 8.2 g/dL / ALK PHOS: 176 U/L / ALT: 91 U/L / AST: 117 U/L / GGT: x             RADIOLOGY & ADDITIONAL STUDIES:       SUBJECTIVE:  Patient was evaluated at bedside this AM by general surgery team. Overnight patient was febrile but has remained afebrile since 2am. Patient was given tylenol and recollected BCx.    MEDICATIONS  (STANDING):  cefepime   IVPB 2000 milliGRAM(s) IV Intermittent every 8 hours  chlorhexidine 0.12% Liquid 15 milliLiter(s) Swish and Spit two times a day  chlorhexidine 2% Cloths 1 Application(s) Topical <User Schedule>  cholestyramine Powder (Sugar-Free) 4 Gram(s) Oral daily  diphenoxylate/atropine 2 Tablet(s) Oral every 6 hours  enoxaparin Injectable 100 milliGRAM(s) SubCutaneous every 12 hours  epoetin matt-epbx (RETACRIT) Injectable 71814 Unit(s) SubCutaneous every 7 days  glucagon  Injectable 1 milliGRAM(s) IntraMuscular once  influenza  Vaccine (HIGH DOSE) 0.7 milliLiter(s) IntraMuscular once  levothyroxine Injectable 40 MICROGram(s) IV Push at bedtime  loperamide 4 milliGRAM(s) Oral every 6 hours  metoprolol tartrate Injectable 5 milliGRAM(s) IV Push every 6 hours  metroNIDAZOLE  IVPB 500 milliGRAM(s) IV Intermittent every 8 hours  metroNIDAZOLE  IVPB      Omegaven 10G/100ml 50 Gram(s) 50 Gram(s) (41.67 mL/Hr) IV Continuous <Continuous>  opium Tincture 6 milliGRAM(s) Oral every 6 hours  pantoprazole  Injectable 40 milliGRAM(s) IV Push daily  Parenteral Nutrition - Adult 1 Each TPN Continuous <Continuous>  ursodiol Suspension 300 milliGRAM(s) Oral every 8 hours  venlafaxine XR. 150 milliGRAM(s) Oral two times a day    MEDICATIONS  (PRN):  LORazepam   Injectable 0.5 milliGRAM(s) IV Push at bedtime PRN Anxiety  ondansetron Injectable 4 milliGRAM(s) IV Push every 8 hours PRN Nausea and/or Vomiting  simethicone 80 milliGRAM(s) Chew daily PRN Gas      Vital Signs Last 24 Hrs  T(C): 37.4 (24 Nov 2023 05:00), Max: 40.4 (23 Nov 2023 23:38)  T(F): 99.3 (24 Nov 2023 05:00), Max: 104.7 (23 Nov 2023 23:38)  HR: 93 (24 Nov 2023 07:00) (92 - 132)  BP: 93/51 (24 Nov 2023 07:00) (85/48 - 227/96)  BP(mean): 69 (24 Nov 2023 07:00) (62 - 157)  RR: 19 (24 Nov 2023 07:00) (18 - 40)  SpO2: 98% (24 Nov 2023 07:00) (90% - 100%)    Parameters below as of 24 Nov 2023 07:00  Patient On (Oxygen Delivery Method): nasal cannula  O2 Flow (L/min): 2      Physical Exam:  Neurological: AAOx3, CNII-XII intact,  strength 5/5 b/l  ENT: mucus membrane moist  Cardiovascular: RRR  Respiratory: CTA  Gastrointestinal: soft, NT, ND, right perc cony with bilious drainage. fistula with wound manager with grayish colored liquid and some formed content. wound bed pink, no bleeding. right sided ostomy with red rubber intact.   Extremities: warm, no dependent edema  Vascular: no cyanosis/erythema  Skin: no rashes  MSK: no joint swelling.     I&O's Summary    23 Nov 2023 07:01  -  24 Nov 2023 07:00  --------------------------------------------------------  IN: 4798.7 mL / OUT: 3845 mL / NET: 953.7 mL        LABS:                        8.9    12.30 )-----------( 132      ( 24 Nov 2023 05:30 )             27.6     11-24    134<L>  |  101  |  44<H>  ----------------------------<  92  4.9   |  24  |  1.36<H>    Ca    8.9      24 Nov 2023 05:30  Phos  3.8     11-24  Mg     2.2     11-24    TPro  8.2  /  Alb  2.5<L>  /  TBili  6.9<H>  /  DBili  4.7<H>  /  AST  117<H>  /  ALT  91<H>  /  AlkPhos  176<H>  11-23    PT/INR - ( 23 Nov 2023 04:55 )   PT: 14.7 sec;   INR: 1.30          PTT - ( 23 Nov 2023 04:55 )  PTT:43.1 sec  Urinalysis Basic - ( 24 Nov 2023 05:30 )    Color: x / Appearance: x / SG: x / pH: x  Gluc: 92 mg/dL / Ketone: x  / Bili: x / Urobili: x   Blood: x / Protein: x / Nitrite: x   Leuk Esterase: x / RBC: x / WBC x   Sq Epi: x / Non Sq Epi: x / Bacteria: x      CAPILLARY BLOOD GLUCOSE        LIVER FUNCTIONS - ( 23 Nov 2023 04:55 )  Alb: 2.5 g/dL / Pro: 8.2 g/dL / ALK PHOS: 176 U/L / ALT: 91 U/L / AST: 117 U/L / GGT: x             RADIOLOGY & ADDITIONAL STUDIES:

## 2023-11-24 NOTE — PROGRESS NOTE ADULT - ASSESSMENT
78M w PMH Crohn's, AFib/Flutter s/p DCCVs on amiodarone, remote ileocectomy and open appendectomy. Admitted (6/23) for SBO vs Crohns flare, s/p NGT decompression and s/p lap converted to open TRE, SBR x 3, left in discontinuity with abthera vac on (6/27), RTOR for ileocolic resection, small bowel anastomosis, and abdominal wall closure on (6/28), c/b fluid collection s/p IR aspiration of perihepatic fluid on (7/3), c/b wound dehiscence s/p RTOR exlap, washout, ileocolic resection with end ileostomy, blow hole colostomy, red rubber from ileostomy to small bowel anastomosis; vicryl bridging mesh on (7/5) transferred to SICU postoperatively for hemodynamic monitoring, with hospital course complicated by periods of severe ELVI and hyponatremia, which resolved but stepped back up for to SICU on (9/10) for acute AMS, intermittent hypoglycemia, AFib with RVR. Percutaneous Cholecystostomy placed on (9/11) for enlarged GB, PCT capped 10/5 and uncapped 10/25 for hyperbilirubinemia--improving after changes to TPN made. Right brachial DVT, left basilic and cephalic superficial thrombus on duplex 11/2, on Lovenox 100 BID--held for bloody stoma output but restarted after resolution/control of superficial bleed. Persistent hyperbilirubinemia and mild transaminitis. Mild persistent leukocytosis. Improved creatinine and urine output     Plan:   - ID recs appreciated  - Appreciate SICU care   - Team 5 following

## 2023-11-24 NOTE — CONSULT NOTE ADULT - ASSESSMENT
78M w PMH Crohn's, AFib/Flutter s/p DCCVs on amiodarone, remote ileocectomy and open appendectomy. Admitted (6/23) for SBO vs Crohns flare with complicated surgical hospital course, now with fungemia (candida glabrata), likely CLABSI. Denies any changes in vision, flashes, or floaters.    No evidence of ocular involvement. Normal ocular exam.    C/w management as per ID and surgical team.    Please page ophthalmology if patient were to develop any new symptoms. Otherwise, can follow-up outpatient following discharge.

## 2023-11-24 NOTE — CONSULT NOTE ADULT - SUBJECTIVE AND OBJECTIVE BOX
[facility] DEPARTMENT OF OPHTHALMOLOGY - INITIAL ADULT CONSULT  --------------------------------------------------------------------------------------------------------  Caitlin Huang MD  --------------------------------------------------------------------------------------------------------    HPI:  78M w PMH Crohn's, AFib/Flutter s/p DCCVs on amiodarone, remote ileocectomy and open appendectomy. Admitted (6/23) for SBO vs Crohns flare, s/p NGT decompression and s/p lap converted to open TRE, SBR x 3, left in discontinuity with abthera vac on (6/27), RTOR for ileocolic resection, small bowel anastomosis, and abdominal wall closure on (6/28), c/b fluid collection s/p IR aspiration of perihepatic fluid on (7/3), c/b wound dehiscence s/p RTOR exlap, washout, ileocolic resection with end ileostomy, blow hole colostomy, red rubber from ileostomy to small bowel anastomosis; vicryl bridging mesh on (7/5) transferred to SICU postoperatively for hemodynamic monitoring, with hospital course complicated by periods of severe ELVI and hyponatremia, which resolved but stepped back up for to SICU on (9/10) for acute AMS, intermittent hypoglycemia, AFib with RVR. Percutaneous Cholecystostomy placed on (9/11) for enlarged GB, PCT capped 10/5 and uncapped 10/25 for hyperbilirubinemia, Right brachial DVT, left basillic and cephalic superficial thrombus on duplex 11/2, CT 11/14 with ALDEN ground glass opacity of unclear etiology, completed empiric 7day cefepime course 11/22, rising T-bili of unclear etilogy, now fungemia (candida glabrata), likely CLABSI.       Interval History: Denies any changes in vision, floaters, flashes.    PAST MEDICAL & SURGICAL HISTORY:  Essential hypertension  Hypertension  Atrial fibrillation  s/p cardioversion 2010 and 2014  Pt. reports 4 DCCV  Now on Amiodarone 200mg PO bid and Eliquis 5mg PO bid  Last DCCV 4 yrs ago at [facility]  Crohn's disease  s/p partial resection of ileum  Hyperlipidemia  Hypothyroidism    Past Ocular History: H/o CE/IOL OU    FAMILY HISTORY: denies glc    Social History: physician    Ophthalmic Medications: none    MEDICATIONS  (STANDING):  cefepime   IVPB 2000 milliGRAM(s) IV Intermittent every 8 hours  chlorhexidine 0.12% Liquid 15 milliLiter(s) Swish and Spit two times a day  chlorhexidine 2% Cloths 1 Application(s) Topical <User Schedule>  cholestyramine Powder (Sugar-Free) 4 Gram(s) Oral daily  dextrose 10% + sodium chloride 0.45%. 1000 milliLiter(s) (100 mL/Hr) IV Continuous <Continuous>  diphenoxylate/atropine 2 Tablet(s) Oral every 6 hours  enoxaparin Injectable 100 milliGRAM(s) SubCutaneous every 12 hours  epoetin matt-epbx (RETACRIT) Injectable 32343 Unit(s) SubCutaneous every 7 days  glucagon  Injectable 1 milliGRAM(s) IntraMuscular once  influenza  Vaccine (HIGH DOSE) 0.7 milliLiter(s) IntraMuscular once  levothyroxine Injectable 40 MICROGram(s) IV Push at bedtime  loperamide 4 milliGRAM(s) Oral every 6 hours  metoprolol tartrate Injectable 5 milliGRAM(s) IV Push every 6 hours  metroNIDAZOLE  IVPB 500 milliGRAM(s) IV Intermittent every 8 hours  metroNIDAZOLE  IVPB      opium Tincture 6 milliGRAM(s) Oral every 6 hours  pantoprazole  Injectable 40 milliGRAM(s) IV Push daily  Parenteral Nutrition - Adult 1 Each TPN Continuous <Continuous>  ursodiol Suspension 300 milliGRAM(s) Oral every 8 hours  venlafaxine XR. 150 milliGRAM(s) Oral two times a day    MEDICATIONS  (PRN):  LORazepam   Injectable 0.5 milliGRAM(s) IV Push at bedtime PRN Anxiety  ondansetron Injectable 4 milliGRAM(s) IV Push every 8 hours PRN Nausea and/or Vomiting  simethicone 80 milliGRAM(s) Chew daily PRN Gas    Allergies & Intolerances:   penicillin (Angioedema)    Review of Systems:  Constitutional: No fever, chills  Eyes: No blurry vision, flashes, floaters, FBS, erythema, discharge, double vision, OU  Neuro: No tremors  Cardiovascular: No chest pain, palpitations  Respiratory: No SOB, no cough  GI: No nausea, vomiting, abdominal pain  : No dysuria  Skin: no rash  Psych: no depression  Endocrine: no polyuria, polydipsia  Heme/lymph: no swelling    VITALS: T(C): 38.1 (11-24-23 @ 12:40)  T(F): 100.6 (11-24-23 @ 12:40), Max: 104.9 (11-23-23 @ 23:38)  HR: 93 (11-24-23 @ 12:00) (90 - 132)  BP: 133/63 (11-24-23 @ 12:00) (77/44 - 227/96)  RR:  (16 - 40)  SpO2:  (90% - 100%)  Wt(kg): --    Ophthalmology Exam:  Visual acuity (sc): 20/20 OU  Pupils: PERRL OU, no APD  Ttono: 9 OU  Extraocular movements (EOMs): Full OU, no pain, no diplopia    Pen Light Exam (PLE)  External: Flat OU  Lids/Lashes/Lacrimal Ducts: Flat OU    Sclera/Conjunctiva: W+Q OU  Cornea: Cl OU  Anterior Chamber: D+F OU    Iris: Flat OU  Lens: PCIOL OU    Fundus Exam: dilated with 1% tropicamide and 2.5% phenylephrine  Approval obtained from primary team for dilation  Patient aware that pupils can remained dilated for at least 4-6 hours  Exam performed with 20D lens    Vitreous: wnl OU  Disc, cup/disc: sharp and pink, 0.4 OU  Macula: wnl OU  Vessels: wnl OU  Periphery: wnl OU

## 2023-11-24 NOTE — PROGRESS NOTE ADULT - ASSESSMENT
78M w PMH Crohn's, AFib/Flutter s/p DCCVs on amiodarone, remote ileocectomy and open appendectomy. Admitted (6/23) for SBO vs Crohns flare, s/p NGT decompression and s/p lap converted to open TRE, SBR x 3, left in discontinuity with abthera vac on (6/27), RTOR for ileocolic resection, small bowel anastomosis, and abdominal wall closure on (6/28), c/b fluid collection s/p IR aspiration of perihepatic fluid on (7/3), c/b wound dehiscence s/p RTOR exlap, washout, ileocolic resection with end ileostomy, blow hole colostomy, red rubber from ileostomy to small bowel anastomosis; vicryl bridging mesh on (7/5) transferred to SICU postoperatively for hemodynamic monitoring, with hospital course complicated by periods of severe ELVI and hyponatremia, which resolved but stepped back up for to SICU on (9/10) for acute AMS, intermittent hypoglycemia, AFib with RVR. Percutaneous Cholecystostomy placed on (9/11) for enlarged GB, PCT capped 10/5 and uncapped 10/25 for hyperbilirubinemia, Right brachial DVT, left basillic and cephalic superficial thrombus on duplex 11/2, CT 11/14 with ALDEN ground glass opacity of unclear etiology, completed empiric 7day cefepime course 11/22, rising T-bili of unclear etilogy, now fungemia (candida glabrata), likely CLABSI.       NEURO: initial hospital course with AMS (resolved), EEG neg. Hx of depression - cont Effexor. Insomnia: Melatonin PRN. Nausea: Zofran PRN. Anxiety: Lorazepam PRN.   HEENT: Cepacol prn  CV: Hx Afib/flutter: s/p DCCV, Amio stopped due to persistent transaminitis. Continue Metoprolol 5q6. Hx HLD: Lipitor held given high LFTs. TTE  (7/18) - PASP 64mmHg, EF 65-70%,  Hx HTN -  BP ok without Norvasc and Losartan.   PULM: CT from 11/14 with ALDEN ground glass opacity of unclear etiology. COVID negative. RVP negative. completed 7d empiric cefepime 11/22 to cover for potential PNA.   GI/FEN: High protein diet. Cont Ensure max x1/day. fungemia likely CLABSI, stop TPN/Omegavan. high output EC fistula: was getting Octreotide in TPN (will restart when he eventually go back on TPN), Imodium, Lomotil, Tincture of Opium, Cholestyramine 10/18. Transaminitis elevated T bili of unclear origin,  s/p Perc Deb on 9/11; MRCP 10/30 no obstruction, seen by Hepatology started on ursodiol, cont PPI. monitor drainage from fistula, bolus as needed to keep I&O even. Ensure once daily with LPS.   : Voids.   ENDO: Hx hypothyroid: IV Synthroid, TSH wnl on 10/23  ID: chest CT with ALDEN with ground glass opacities, unclear etiology. completed empiric 7days cefepime (11/15-11/22), bld cx from 11/22 grew candida glabrata. likely CLABSI. PICC removed. ID consulted. added caspofungin. will repeat surveillance blood cx. ophthalmology consult to eval for potential fungal enophthalmitis. probably doesn't need cefepime/flagyl/vanco anymore. recent MRSA swab negative, RVP panel negative, COVID negative this past week. Cont Nystatin powder // PREVIOUSLY had C. tertium, Lactobacillis from his IR cx 7/3, and candida albicans, lactobacillus, vanc sensitive E faecium, vanc resistant E gallinarum, vanc resistant E casseliflavus, lactobacillus from his OR cx 7/5. Completed course of abx with Imipenem (9/10-9/15). Imipenem (8/26--) imipenem (6/30-7/12, 7/23-7/30), Dapto (6/30-7/5 and 7/23-7/24). Empiric dapto (8/23-24) and cefepime (8/23-24).   PPX: SCDs, Therapeutic lovenox due to R brachial vein DVT   HEME: Anemia: continue Epogen weekly. S/p Iron Sucrose 200 qd x 3 days for chronic anemia.  LINES: PIVs, ///DC'd: LUE PICC (9/1-11/1 ), RUE PICC: (11/1-11/24)   WOUNDS/DRAINS: Abdominal wound and fistula with wound manager in place dressing change Tuesday and Friday. RLQ Ostomy. IR perc deb. // DC: L Clay drain.  PT: Ambulate as tolerated   DISPO: SICU due to complicated hospital course.

## 2023-11-24 NOTE — PROGRESS NOTE ADULT - ASSESSMENT
78M w/ hx Crohn’s disease and prolonged hospitalization since 6/23, admitted for SBO s/p open TRE, SBR x 3, left in discontinuity with abthera, then RTOR for ileocolic resection and small bowel anastamosis with abdominal closure (6/28) c/b fluid collection/feculent peritonitis s/p IR aspiration (7/3) and then exlap, washout, ileocolic resection with end ileostomy, blow hole colostomy, red rubber from ileostomy to small bowel anastomosis, and vicryl bridging mesh (7/5) with abdominal cx at that time w/ VRE.casseliflavus, VRE.gallinarum E.faecium, Clostridium tertium, L.rhamnosus, C.albicans, then possible acalculous cholecystitis s/p perc cony (9/11) with negative bile fluid cx, then had CT with ALDEN GGOs for which received 7 days cefepime EOT 11/22, but then developed fevers up to 105 on 11/23, associated with hypotension, and rising leukocytosis to 12k, UA neg, BCx x2 with C.glabrata. RUE PICC line removed today (had been receiving TPN through this line).     # C.glabrata CLABSI (RUE PICC for TPN)  - S/p removal of PICC 11/24  - Start caspofungin 70mg today, then 50mg daily starting tomorrow  - TTE and repeat blood cultures x2  - Ophtho exam negative 11/24- appreciate their consultation    # Fever, likely 2/2 the above. No clinical signs of pneumonia or any ongoing abdominal infection. GPCs from 2 month old perc cony drain is likely colonizing- would not treat  -Stop cefepime/flagyl    ID Team 1 will follow

## 2023-11-24 NOTE — PROGRESS NOTE ADULT - SUBJECTIVE AND OBJECTIVE BOX
S: feels tired. denies cough.     O: ICU Vital Signs Last 24 Hrs  T(F): 98.4 (11-24-23 @ 08:00), Max: 104.9 (11-23-23 @ 23:38)  HR: 92 (11-24-23 @ 11:00) (90 - 132)  BP: 134/66 (11-24-23 @ 11:00) (77/44 - 227/96)  BP(mean): 92 (11-24-23 @ 11:00) (56 - 157)  ABP: --  RR: 17 (11-24-23 @ 11:00) (16 - 40)  SpO2: 99% (11-24-23 @ 11:00) (90% - 100%)    PHYSICAL EXAM:   Neurological: AAOx3, CNII-XII intact,  strength 5/5 b/l  ENT: mucus membrane moist  Cardiovascular: RRR  Respiratory: CTA  Gastrointestinal: soft, NT, ND, right perc cony with bilious drainage. fistula with wound manager with grayish colored liquid and some formed content. wound bed pink, no bleeding. right sided ostomy with red rubber intact.   Extremities: warm, no dependent edema  Vascular: no cyanosis/erythema  Skin: no rashes  MSK: no joint swelling.     LABS:    11-24    134<L>  |  101  |  44<H>  ----------------------------<  92  4.9   |  24  |  1.36<H>    Ca    8.9      24 Nov 2023 05:30  Phos  3.8     11-24  Mg     2.2     11-24    TPro  8.2  /  Alb  2.5<L>  /  TBili  6.9<H>  /  DBili  4.7<H>  /  AST  117<H>  /  ALT  91<H>  /  AlkPhos  176<H>  11-23  LIVER FUNCTIONS - ( 23 Nov 2023 04:55 )  Alb: 2.5 g/dL / Pro: 8.2 g/dL / ALK PHOS: 176 U/L / ALT: 91 U/L / AST: 117 U/L / GGT: x                               8.9    12.30 )-----------( 132      ( 24 Nov 2023 05:30 )             27.6   PT/INR - ( 23 Nov 2023 04:55 )   PT: 14.7 sec;   INR: 1.30          PTT - ( 23 Nov 2023 04:55 )  PTT:43.1 sec  Urinalysis Basic - ( 24 Nov 2023 05:30 )    Color: x / Appearance: x / SG: x / pH: x  Gluc: 92 mg/dL / Ketone: x  / Bili: x / Urobili: x   Blood: x / Protein: x / Nitrite: x   Leuk Esterase: x / RBC: x / WBC x   Sq Epi: x / Non Sq Epi: x / Bacteria: x    CAPILLARY BLOOD GLUCOSE        MEDICATIONS  (STANDING):  cefepime   IVPB 2000 milliGRAM(s) IV Intermittent every 8 hours  chlorhexidine 0.12% Liquid 15 milliLiter(s) Swish and Spit two times a day  chlorhexidine 2% Cloths 1 Application(s) Topical <User Schedule>  cholestyramine Powder (Sugar-Free) 4 Gram(s) Oral daily  dextrose 10% + sodium chloride 0.45%. 1000 milliLiter(s) (100 mL/Hr) IV Continuous <Continuous>  diphenoxylate/atropine 2 Tablet(s) Oral every 6 hours  enoxaparin Injectable 100 milliGRAM(s) SubCutaneous every 12 hours  epoetin matt-epbx (RETACRIT) Injectable 19380 Unit(s) SubCutaneous every 7 days  glucagon  Injectable 1 milliGRAM(s) IntraMuscular once  influenza  Vaccine (HIGH DOSE) 0.7 milliLiter(s) IntraMuscular once  levothyroxine Injectable 40 MICROGram(s) IV Push at bedtime  loperamide 4 milliGRAM(s) Oral every 6 hours  metoprolol tartrate Injectable 5 milliGRAM(s) IV Push every 6 hours  metroNIDAZOLE  IVPB 500 milliGRAM(s) IV Intermittent every 8 hours  metroNIDAZOLE  IVPB      opium Tincture 6 milliGRAM(s) Oral every 6 hours  pantoprazole  Injectable 40 milliGRAM(s) IV Push daily  Parenteral Nutrition - Adult 1 Each TPN Continuous <Continuous>  ursodiol Suspension 300 milliGRAM(s) Oral every 8 hours  venlafaxine XR. 150 milliGRAM(s) Oral two times a day    MEDICATIONS  (PRN):  LORazepam   Injectable 0.5 milliGRAM(s) IV Push at bedtime PRN Anxiety  ondansetron Injectable 4 milliGRAM(s) IV Push every 8 hours PRN Nausea and/or Vomiting  simethicone 80 milliGRAM(s) Chew daily PRN Gas      Poole:	  [ x] None	[ ] Daily Poole Order Placed	   Indication:	  [ ] Strict I and O's    [ ] Obstruction     [ ] Incontinence + Stage 3 or 4 Decubitus  Central Line:  [ ] None	   [x ]  Medication / TPN Administration     [ ] No Peripheral IV

## 2023-11-24 NOTE — PROGRESS NOTE ADULT - SUBJECTIVE AND OBJECTIVE BOX
Events of last night reviewed with ICU team  Awake and alert this am Back in RSR Good BS ant No edema

## 2023-11-24 NOTE — PROGRESS NOTE ADULT - SUBJECTIVE AND OBJECTIVE BOX
INFECTIOUS DISEASES CONSULT FOLLOW-UP NOTE    INTERVAL HPI/OVERNIGHT EVENTS:  Febrile to 105 and BCx w/ C.glabrata  No abdominal pain    ROS:   Constitutional, eyes, ENT, cardiovascular, respiratory, gastrointestinal, genitourinary, integumentary, neurological, psychiatric and heme/lymph are otherwise negative other than noted above       ANTIBIOTICS/RELEVANT:    MEDICATIONS  (STANDING):  chlorhexidine 0.12% Liquid 15 milliLiter(s) Swish and Spit two times a day  chlorhexidine 2% Cloths 1 Application(s) Topical <User Schedule>  cholestyramine Powder (Sugar-Free) 4 Gram(s) Oral daily  dextrose 10% + sodium chloride 0.45%. 1000 milliLiter(s) (100 mL/Hr) IV Continuous <Continuous>  diphenoxylate/atropine 2 Tablet(s) Oral every 6 hours  enoxaparin Injectable 100 milliGRAM(s) SubCutaneous every 12 hours  epoetin matt-epbx (RETACRIT) Injectable 13024 Unit(s) SubCutaneous every 7 days  glucagon  Injectable 1 milliGRAM(s) IntraMuscular once  influenza  Vaccine (HIGH DOSE) 0.7 milliLiter(s) IntraMuscular once  levothyroxine Injectable 40 MICROGram(s) IV Push at bedtime  loperamide 4 milliGRAM(s) Oral every 6 hours  metoprolol tartrate Injectable 5 milliGRAM(s) IV Push every 6 hours  opium Tincture 6 milliGRAM(s) Oral every 6 hours  pantoprazole  Injectable 40 milliGRAM(s) IV Push daily  Parenteral Nutrition - Adult 1 Each TPN Continuous <Continuous>  ursodiol Suspension 300 milliGRAM(s) Oral every 8 hours  venlafaxine XR. 150 milliGRAM(s) Oral two times a day    MEDICATIONS  (PRN):  LORazepam   Injectable 0.5 milliGRAM(s) IV Push at bedtime PRN Anxiety  ondansetron Injectable 4 milliGRAM(s) IV Push every 8 hours PRN Nausea and/or Vomiting  simethicone 80 milliGRAM(s) Chew daily PRN Gas        Vital Signs Last 24 Hrs  T(C): 37.3 (24 Nov 2023 17:45), Max: 40.5 (23 Nov 2023 23:38)  T(F): 99.1 (24 Nov 2023 17:45), Max: 104.9 (23 Nov 2023 23:38)  HR: 91 (24 Nov 2023 18:00) (90 - 132)  BP: 103/57 (24 Nov 2023 18:00) (77/44 - 227/96)  BP(mean): 74 (24 Nov 2023 18:00) (56 - 157)  RR: 22 (24 Nov 2023 18:00) (15 - 40)  SpO2: 98% (24 Nov 2023 17:00) (90% - 100%)    Parameters below as of 24 Nov 2023 19:00  Patient On (Oxygen Delivery Method): nasal cannula  O2 Flow (L/min): 2      11-23-23 @ 07:01  -  11-24-23 @ 07:00  --------------------------------------------------------  IN: 4798.7 mL / OUT: 3845 mL / NET: 953.7 mL    11-24-23 @ 07:01  -  11-24-23 @ 19:07  --------------------------------------------------------  IN: 1140 mL / OUT: 1725 mL / NET: -585 mL      PHYSICAL EXAM:  Constitutional: alert, NAD  Eyes: the sclera and conjunctiva were normal.   ENT: the ears and nose were normal in appearance.   Neck: the appearance of the neck was normal and the neck was supple.   Pulmonary: no respiratory distress and lungs were clear to auscultation bilaterally in anterior fields  Heart: heart rate was normal and rhythm regular, no murmur  Vascular:. there was no peripheral edema  Abdomen: soft, non-tender. Perc cony with bilious output. Fistula with overlying wound manager. R side ostomy.  Neurological: no focal deficits.   Psychiatric: the affect was normal  MSK: Prior RUE PICC site c/d/i      LABS:                        8.9    12.30 )-----------( 132      ( 24 Nov 2023 05:30 )             27.6     11-24    134<L>  |  101  |  44<H>  ----------------------------<  92  4.9   |  24  |  1.36<H>    Ca    8.9      24 Nov 2023 05:30  Phos  3.8     11-24  Mg     2.2     11-24    TPro  8.2  /  Alb  2.5<L>  /  TBili  6.9<H>  /  DBili  4.7<H>  /  AST  117<H>  /  ALT  91<H>  /  AlkPhos  176<H>  11-23    PT/INR - ( 23 Nov 2023 04:55 )   PT: 14.7 sec;   INR: 1.30          PTT - ( 23 Nov 2023 04:55 )  PTT:43.1 sec  Urinalysis Basic - ( 24 Nov 2023 05:30 )    Color: x / Appearance: x / SG: x / pH: x  Gluc: 92 mg/dL / Ketone: x  / Bili: x / Urobili: x   Blood: x / Protein: x / Nitrite: x   Leuk Esterase: x / RBC: x / WBC x   Sq Epi: x / Non Sq Epi: x / Bacteria: x        MICROBIOLOGY:  Reviewed    RADIOLOGY & ADDITIONAL STUDIES:  Reviewed INFECTIOUS DISEASES CONSULT FOLLOW-UP NOTE    INTERVAL HPI/OVERNIGHT EVENTS:  Febrile to 105 and BCx w/ C.glabrata  No abdominal pain    ROS:   Constitutional, eyes, ENT, cardiovascular, respiratory, gastrointestinal, genitourinary, integumentary, neurological, psychiatric and heme/lymph are otherwise negative other than noted above       ANTIBIOTICS/RELEVANT:    MEDICATIONS  (STANDING):  chlorhexidine 0.12% Liquid 15 milliLiter(s) Swish and Spit two times a day  chlorhexidine 2% Cloths 1 Application(s) Topical <User Schedule>  cholestyramine Powder (Sugar-Free) 4 Gram(s) Oral daily  dextrose 10% + sodium chloride 0.45%. 1000 milliLiter(s) (100 mL/Hr) IV Continuous <Continuous>  diphenoxylate/atropine 2 Tablet(s) Oral every 6 hours  enoxaparin Injectable 100 milliGRAM(s) SubCutaneous every 12 hours  epoetin matt-epbx (RETACRIT) Injectable 91106 Unit(s) SubCutaneous every 7 days  glucagon  Injectable 1 milliGRAM(s) IntraMuscular once  influenza  Vaccine (HIGH DOSE) 0.7 milliLiter(s) IntraMuscular once  levothyroxine Injectable 40 MICROGram(s) IV Push at bedtime  loperamide 4 milliGRAM(s) Oral every 6 hours  metoprolol tartrate Injectable 5 milliGRAM(s) IV Push every 6 hours  opium Tincture 6 milliGRAM(s) Oral every 6 hours  pantoprazole  Injectable 40 milliGRAM(s) IV Push daily  Parenteral Nutrition - Adult 1 Each TPN Continuous <Continuous>  ursodiol Suspension 300 milliGRAM(s) Oral every 8 hours  venlafaxine XR. 150 milliGRAM(s) Oral two times a day    MEDICATIONS  (PRN):  LORazepam   Injectable 0.5 milliGRAM(s) IV Push at bedtime PRN Anxiety  ondansetron Injectable 4 milliGRAM(s) IV Push every 8 hours PRN Nausea and/or Vomiting  simethicone 80 milliGRAM(s) Chew daily PRN Gas        Vital Signs Last 24 Hrs  T(C): 37.3 (24 Nov 2023 17:45), Max: 40.5 (23 Nov 2023 23:38)  T(F): 99.1 (24 Nov 2023 17:45), Max: 104.9 (23 Nov 2023 23:38)  HR: 91 (24 Nov 2023 18:00) (90 - 132)  BP: 103/57 (24 Nov 2023 18:00) (77/44 - 227/96)  BP(mean): 74 (24 Nov 2023 18:00) (56 - 157)  RR: 22 (24 Nov 2023 18:00) (15 - 40)  SpO2: 98% (24 Nov 2023 17:00) (90% - 100%)    Parameters below as of 24 Nov 2023 19:00  Patient On (Oxygen Delivery Method): nasal cannula  O2 Flow (L/min): 2      11-23-23 @ 07:01  -  11-24-23 @ 07:00  --------------------------------------------------------  IN: 4798.7 mL / OUT: 3845 mL / NET: 953.7 mL    11-24-23 @ 07:01  -  11-24-23 @ 19:07  --------------------------------------------------------  IN: 1140 mL / OUT: 1725 mL / NET: -585 mL      PHYSICAL EXAM:  Constitutional: alert, NAD  Eyes: the sclera and conjunctiva were normal.   ENT: the ears and nose were normal in appearance.   Neck: the appearance of the neck was normal and the neck was supple.   Pulmonary: no respiratory distress and lungs were clear to auscultation bilaterally in anterior fields  Heart: heart rate was normal and rhythm regular, no murmur  Vascular:. there was no peripheral edema  Abdomen: soft, non-tender. Perc cony with bilious output. Fistula with overlying wound manager. R side ostomy.  Neurological: no focal deficits.   Psychiatric: the affect was normal  MSK: Prior RUE PICC site c/d/i. Bilateral TKA scars without any joint swelling/tenderness/decreased ROM      LABS:                        8.9    12.30 )-----------( 132      ( 24 Nov 2023 05:30 )             27.6     11-24    134<L>  |  101  |  44<H>  ----------------------------<  92  4.9   |  24  |  1.36<H>    Ca    8.9      24 Nov 2023 05:30  Phos  3.8     11-24  Mg     2.2     11-24    TPro  8.2  /  Alb  2.5<L>  /  TBili  6.9<H>  /  DBili  4.7<H>  /  AST  117<H>  /  ALT  91<H>  /  AlkPhos  176<H>  11-23    PT/INR - ( 23 Nov 2023 04:55 )   PT: 14.7 sec;   INR: 1.30          PTT - ( 23 Nov 2023 04:55 )  PTT:43.1 sec  Urinalysis Basic - ( 24 Nov 2023 05:30 )    Color: x / Appearance: x / SG: x / pH: x  Gluc: 92 mg/dL / Ketone: x  / Bili: x / Urobili: x   Blood: x / Protein: x / Nitrite: x   Leuk Esterase: x / RBC: x / WBC x   Sq Epi: x / Non Sq Epi: x / Bacteria: x        MICROBIOLOGY:  Reviewed    RADIOLOGY & ADDITIONAL STUDIES:  Reviewed

## 2023-11-24 NOTE — ADVANCED PRACTICE NURSE CONSULT - ASSESSMENT
Feeling better this AM. 1.7L out of fistula yesterday.     Abdomen:  wound continues to contract with 100% red granulation tissue and new epithelium to edges. Stomatized fistula at 2 o'clock with thin/watery light brown effluent. Periwound skin is intact without irritation. RLQ ileostomy stoma retracted, peristomal skin intact. Stoma intubated with red rubber catheter. Thin yellow drainage from ileostomy    Skin thoroughly cleansed.  Vashe soak applied to wound bed.  Cavilon No Sting Barrier film applied to periwound, Adapt ostomy rings, stoma paste applied around wound and within creases.  Entire area pouched with an [employer] wound manager # [phone].   2 piece 1 3/4" pouch with Adapt ring applied to RLQ Ileostomy.

## 2023-11-25 NOTE — PROGRESS NOTE ADULT - ASSESSMENT
78M w PMH Crohn's, AFib/Flutter s/p DCCVs on amiodarone, remote ileocectomy and open appendectomy. Admitted (6/23) for SBO vs Crohns flare, s/p NGT decompression and s/p lap converted to open TRE, SBR x 3, left in discontinuity with abthera vac on (6/27), RTOR for ileocolic resection, small bowel anastomosis, and abdominal wall closure on (6/28), c/b fluid collection s/p IR aspiration of perihepatic fluid on (7/3), c/b wound dehiscence s/p RTOR exlap, washout, ileocolic resection with end ileostomy, blow hole colostomy, red rubber from ileostomy to small bowel anastomosis; vicryl bridging mesh on (7/5) transferred to SICU postoperatively for hemodynamic monitoring, with hospital course complicated by periods of severe ELVI and hyponatremia, which resolved but stepped back up for to SICU on (9/10) for acute AMS, intermittent hypoglycemia, AFib with RVR. Percutaneous Cholecystostomy placed on (9/11) for enlarged GB, PCT capped 10/5 and uncapped 10/25 for hyperbilirubinemia, Right brachial DVT, left basillic and cephalic superficial thrombus on duplex 11/2, CT 11/14 with ALDEN ground glass opacity of unclear etiology, completed empiric 7day cefepime course 11/22, rising T-bili of unclear etilogy, now sepsis w/ fungemia (mehran glabrata), likely CLABSI.       NEURO: initial hospital course with AMS (resolved), EEG neg. Hx of depression - cont Effexor. Insomnia: Melatonin PRN. Nausea: Zofran PRN. Anxiety: Lorazepam PRN.   HEENT: Cepacol prn  CV: Hx Afib/flutter: s/p DCCV, Amio stopped due to persistent transaminitis. Continue Metoprolol 5q6 with hold parameters. Hx HLD: Lipitor held given high LFTs. TTE  (7/18) - PASP 64mmHg, EF 65-70%,  Hx HTN -  BP ok without Norvasc and Losartan.   PULM: CT from 11/14 with ALDEN ground glass opacity of unclear etiology. COVID negative. RVP negative. completed 7d empiric cefepime 11/22 to cover for potential PNA.   GI/FEN: High protein diet. Cont Ensure max x1/day. 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 high output EC fistula: cont Octreotide, Imodium, Lomotil, Tincture of Opium, Cholestyramine 10/18. Transaminitis elevated T bili of unclear origin,  s/p Perc Deb on 9/11; MRCP 10/30 no obstruction, seen by Hepatology started on ursodiol, will repeat RUQ US, consider repeat MRCP, cont PPI. monitor drainage from fistula, bolus as needed to keep I&O even. Ensure once daily with LPS. getting NS@100 for now while holding TPN  : Voids. ELVI, Cr up to 2.39. still making urine. follow closely.   ENDO: Hx hypothyroid: IV Synthroid, TSH wnl on 10/23  ID: chest CT with ALDEN with ground glass opacities, unclear etiology. completed empiric 7days cefepime (11/15-11/22), bld cx from 11/22 grew candida glabrata. likely CLABSI. PICC removed 11/24. ID consulted. added caspofungin (11/24--) . sent surveillance blood cx today. ophthalmology evaulated - normal ocular exam. echo no vegetation, off cefepime/flagyl. recent MRSA swab negative, RVP panel negative, COVID negative this past week. Cont Nystatin powder // PREVIOUSLY had C. tertium, Lactobacillis from his IR cx 7/3, and candida albicans, lactobacillus, vanc sensitive E faecium, vanc resistant E gallinarum, vanc resistant E casseliflavus, lactobacillus from his OR cx 7/5. Completed course of abx with Imipenem (9/10-9/15). Imipenem (8/26--) imipenem (6/30-7/12, 7/23-7/30), Dapto (6/30-7/5 and 7/23-7/24). Empiric dapto (8/23-24) and cefepime (8/23-24).   PPX: SCDs, was on Therapeutic lovenox bid  for R brachial vein DVT, will switch to hep gtts in setting of ELVI. (will start when ptt becomes lower),   HEME: Anemia: continue Epogen weekly. S/p Iron Sucrose 200 qd x 3 days for chronic anemia.  LINES: PIVs, ///DC'd: ZAHEER PICC (9/1-11/1 ), HOLLIS PICC: (11/1-11/24)   WOUNDS/DRAINS: Abdominal wound and fistula with wound manager in place dressing change Tuesday and Friday. RLQ Ostomy. IR perc deb. // DC: L Clay drain.  PT: Ambulate as tolerated   DISPO: SICU due to complicated hospital course.

## 2023-11-25 NOTE — PROGRESS NOTE ADULT - ASSESSMENT
78M w/ hx Crohn’s disease and prolonged hospitalization since 6/23, admitted for SBO s/p open TRE, SBR x 3, left in discontinuity with abthera, then RTOR for ileocolic resection and small bowel anastamosis with abdominal closure (6/28) c/b fluid collection/feculent peritonitis s/p IR aspiration (7/3) and then exlap, washout, ileocolic resection with end ileostomy, blow hole colostomy, red rubber from ileostomy to small bowel anastomosis, and vicryl bridging mesh (7/5) with abdominal cx at that time w/ VRE.casseliflavus, VRE.gallinarum E.faecium, Clostridium tertium, L.rhamnosus, C.albicans, then possible acalculous cholecystitis s/p perc cony (9/11) with negative bile fluid cx, then had CT with ALDEN GGOs for which received 7 days cefepime EOT 11/22, but then developed fevers up to 105 on 11/23, associated with hypotension, and rising leukocytosis to 12k, UA neg, BCx x2 with C.glabrata. RUE PICC line removed 11/24 (had been receiving TPN through this line).     # C.glabrata CLABSI (RUE PICC for TPN)  - S/p removal of PICC 11/24  - Continue caspofungin 50mg daily  - Repeat BCx 11/24 ngtd  - TTE neg 11/24 and ophtho exam negative 11/24  - Recommend duplex of RUE to r/o DVT at site of prior PICC    # Fever, likely 2/2 the above. No clinical signs of any additional source of infection, although noted rising bili, pending RUQ u/s today. GPCs from 2 month old perc cony drain is likely colonizing- would not treat for now.  - If developed hemodynamic instability would add daptomycin 1000mg IV q48h and imipenem 500mg IV a12h    ID Team 1 will follow

## 2023-11-25 NOTE — PROGRESS NOTE ADULT - SUBJECTIVE AND OBJECTIVE BOX
S: No new issues/events overnight, no new med c/o    O: ICU Vital Signs Last 24 Hrs  T(F): 98.6 (11-25-23 @ 09:00), Max: 101 (11-25-23 @ 02:39)  HR: 90 (11-25-23 @ 11:00) (90 - 106)  BP: 123/62 (11-25-23 @ 11:00) (82/49 - 161/66)  BP(mean): 85 (11-25-23 @ 11:00) (61 - 95)  ABP: --  RR: 17 (11-25-23 @ 11:00) (13 - 27)  SpO2: 100% (11-25-23 @ 11:00) (93% - 100%)    PHYSICAL EXAM:   Neurological: AAOx3, CNII-XII intact,  strength 5/5 b/l  ENT: mucus membrane moist  Cardiovascular: RRR  Respiratory: CTA  Gastrointestinal: soft, NT, ND, BS+  Extremities: warm, no dependent edema  Vascular: no cyanosis/erythema  Skin: no rashes  MSK: no joint swelling.     LABS:    11-25    130<L>  |  99  |  58<H>  ----------------------------<  84  4.2   |  22  |  2.39<H>    Ca    8.6      25 Nov 2023 08:54  Phos  4.2     11-25  Mg     2.1     11-25    TPro  7.2  /  Alb  2.1<L>  /  TBili  8.8<H>  /  DBili  x   /  AST  145<H>  /  ALT  91<H>  /  AlkPhos  146<H>  11-25  LIVER FUNCTIONS - ( 25 Nov 2023 08:54 )  Alb: 2.1 g/dL / Pro: 7.2 g/dL / ALK PHOS: 146 U/L / ALT: 91 U/L / AST: 145 U/L / GGT: x                               9.8    9.79  )-----------( 147      ( 25 Nov 2023 02:50 )             30.8   PT/INR - ( 25 Nov 2023 08:52 )   PT: 20.5 sec;   INR: 1.83          PTT - ( 25 Nov 2023 08:52 )  PTT:69.6 sec  Urinalysis Basic - ( 25 Nov 2023 08:54 )    Color: x / Appearance: x / SG: x / pH: x  Gluc: 84 mg/dL / Ketone: x  / Bili: x / Urobili: x   Blood: x / Protein: x / Nitrite: x   Leuk Esterase: x / RBC: x / WBC x   Sq Epi: x / Non Sq Epi: x / Bacteria: x    CAPILLARY BLOOD GLUCOSE        MEDICATIONS  (STANDING):  caspofungin IVPB 50 milliGRAM(s) IV Intermittent every 24 hours  chlorhexidine 0.12% Liquid 15 milliLiter(s) Swish and Spit two times a day  chlorhexidine 2% Cloths 1 Application(s) Topical <User Schedule>  cholestyramine Powder (Sugar-Free) 4 Gram(s) Oral daily  diphenoxylate/atropine 2 Tablet(s) Oral every 6 hours  epoetin matt-epbx (RETACRIT) Injectable 66268 Unit(s) SubCutaneous every 7 days  FIRST- Mouthwash  BLM 10 milliLiter(s) Swish and Spit three times a day  glucagon  Injectable 1 milliGRAM(s) IntraMuscular once  influenza  Vaccine (HIGH DOSE) 0.7 milliLiter(s) IntraMuscular once  levothyroxine Injectable 40 MICROGram(s) IV Push at bedtime  loperamide 4 milliGRAM(s) Oral every 6 hours  metoprolol tartrate Injectable 5 milliGRAM(s) IV Push every 6 hours  opium Tincture 6 milliGRAM(s) Oral every 6 hours  pantoprazole  Injectable 40 milliGRAM(s) IV Push daily  sodium chloride 0.9%. 1000 milliLiter(s) (100 mL/Hr) IV Continuous <Continuous>  ursodiol Suspension 300 milliGRAM(s) Oral every 8 hours  venlafaxine XR. 150 milliGRAM(s) Oral two times a day    MEDICATIONS  (PRN):  LORazepam   Injectable 0.5 milliGRAM(s) IV Push at bedtime PRN Anxiety  ondansetron Injectable 4 milliGRAM(s) IV Push every 8 hours PRN Nausea and/or Vomiting  simethicone 80 milliGRAM(s) Chew daily PRN Gas      Poole:	  [ ] None	[ ] Daily Poole Order Placed	   Indication:	  [ ] Strict I and O's    [ ] Obstruction     [ ] Incontinence + Stage 3 or 4 Decubitus  Central Line:  [ ] None	   [ ]  Medication / TPN Administration     [ ] No Peripheral IV        S: No new issues/events overnight, no new med c/o    O: ICU Vital Signs Last 24 Hrs  T(F): 98.6 (11-25-23 @ 09:00), Max: 101 (11-25-23 @ 02:39)  HR: 90 (11-25-23 @ 11:00) (90 - 106)  BP: 123/62 (11-25-23 @ 11:00) (82/49 - 161/66)  BP(mean): 85 (11-25-23 @ 11:00) (61 - 95)  ABP: --  RR: 17 (11-25-23 @ 11:00) (13 - 27)  SpO2: 100% (11-25-23 @ 11:00) (93% - 100%)    PHYSICAL EXAM:   Neurological: AAOx3, CNII-XII intact,  strength 5/5 b/l  ENT: mucus membrane moist  Cardiovascular: RRR  Respiratory: CTA  Gastrointestinal: soft, NT, ND, right perc cony with bilious drainage. fistula with wound manager with grayish colored liquid and some formed content. wound bed pink, no bleeding. right sided ostomy with red rubber intact.   Extremities: warm, no dependent edema  Vascular: no cyanosis/erythema  Skin: no rashes  MSK: no joint swelling.       LABS:    11-25    130<L>  |  99  |  58<H>  ----------------------------<  84  4.2   |  22  |  2.39<H>    Ca    8.6      25 Nov 2023 08:54  Phos  4.2     11-25  Mg     2.1     11-25    TPro  7.2  /  Alb  2.1<L>  /  TBili  8.8<H>  /  DBili  x   /  AST  145<H>  /  ALT  91<H>  /  AlkPhos  146<H>  11-25  LIVER FUNCTIONS - ( 25 Nov 2023 08:54 )  Alb: 2.1 g/dL / Pro: 7.2 g/dL / ALK PHOS: 146 U/L / ALT: 91 U/L / AST: 145 U/L / GGT: x                               9.8    9.79  )-----------( 147      ( 25 Nov 2023 02:50 )             30.8   PT/INR - ( 25 Nov 2023 08:52 )   PT: 20.5 sec;   INR: 1.83          PTT - ( 25 Nov 2023 08:52 )  PTT:69.6 sec  Urinalysis Basic - ( 25 Nov 2023 08:54 )    Color: x / Appearance: x / SG: x / pH: x  Gluc: 84 mg/dL / Ketone: x  / Bili: x / Urobili: x   Blood: x / Protein: x / Nitrite: x   Leuk Esterase: x / RBC: x / WBC x   Sq Epi: x / Non Sq Epi: x / Bacteria: x    CAPILLARY BLOOD GLUCOSE        MEDICATIONS  (STANDING):  caspofungin IVPB 50 milliGRAM(s) IV Intermittent every 24 hours  chlorhexidine 0.12% Liquid 15 milliLiter(s) Swish and Spit two times a day  chlorhexidine 2% Cloths 1 Application(s) Topical <User Schedule>  cholestyramine Powder (Sugar-Free) 4 Gram(s) Oral daily  diphenoxylate/atropine 2 Tablet(s) Oral every 6 hours  epoetin matt-epbx (RETACRIT) Injectable 44244 Unit(s) SubCutaneous every 7 days  FIRST- Mouthwash  BLM 10 milliLiter(s) Swish and Spit three times a day  glucagon  Injectable 1 milliGRAM(s) IntraMuscular once  influenza  Vaccine (HIGH DOSE) 0.7 milliLiter(s) IntraMuscular once  levothyroxine Injectable 40 MICROGram(s) IV Push at bedtime  loperamide 4 milliGRAM(s) Oral every 6 hours  metoprolol tartrate Injectable 5 milliGRAM(s) IV Push every 6 hours  opium Tincture 6 milliGRAM(s) Oral every 6 hours  pantoprazole  Injectable 40 milliGRAM(s) IV Push daily  sodium chloride 0.9%. 1000 milliLiter(s) (100 mL/Hr) IV Continuous <Continuous>  ursodiol Suspension 300 milliGRAM(s) Oral every 8 hours  venlafaxine XR. 150 milliGRAM(s) Oral two times a day    MEDICATIONS  (PRN):  LORazepam   Injectable 0.5 milliGRAM(s) IV Push at bedtime PRN Anxiety  ondansetron Injectable 4 milliGRAM(s) IV Push every 8 hours PRN Nausea and/or Vomiting  simethicone 80 milliGRAM(s) Chew daily PRN Gas      Poole:	  [x ] None	[ ] Daily Poole Order Placed	   Indication:	  [ ] Strict I and O's    [ ] Obstruction     [ ] Incontinence + Stage 3 or 4 Decubitus  Central Line:  [x ] None	   [ ]  Medication / TPN Administration     [ ] No Peripheral IV

## 2023-11-25 NOTE — PROGRESS NOTE ADULT - SUBJECTIVE AND OBJECTIVE BOX
INFECTIOUS DISEASES CONSULT FOLLOW-UP NOTE    INTERVAL HPI/OVERNIGHT EVENTS:  T 101, WBC normalized. Ophtho exam and TTE neg.  Patient feeling overall well, no abdominal pain/nausea/vomiting    ROS:   Constitutional, eyes, ENT, cardiovascular, respiratory, gastrointestinal, genitourinary, integumentary, neurological, psychiatric and heme/lymph are otherwise negative other than noted above       ANTIBIOTICS/RELEVANT:    MEDICATIONS  (STANDING):  caspofungin IVPB 50 milliGRAM(s) IV Intermittent every 24 hours  chlorhexidine 0.12% Liquid 15 milliLiter(s) Swish and Spit two times a day  chlorhexidine 2% Cloths 1 Application(s) Topical <User Schedule>  cholestyramine Powder (Sugar-Free) 4 Gram(s) Oral daily  diphenoxylate/atropine 2 Tablet(s) Oral every 6 hours  epoetin matt-epbx (RETACRIT) Injectable 24292 Unit(s) SubCutaneous every 7 days  FIRST- Mouthwash  BLM 10 milliLiter(s) Swish and Spit three times a day  glucagon  Injectable 1 milliGRAM(s) IntraMuscular once  influenza  Vaccine (HIGH DOSE) 0.7 milliLiter(s) IntraMuscular once  levothyroxine Injectable 40 MICROGram(s) IV Push at bedtime  loperamide 4 milliGRAM(s) Oral every 6 hours  metoprolol tartrate Injectable 5 milliGRAM(s) IV Push every 6 hours  opium Tincture 6 milliGRAM(s) Oral every 6 hours  pantoprazole  Injectable 40 milliGRAM(s) IV Push daily  sodium chloride 0.9%. 1000 milliLiter(s) (100 mL/Hr) IV Continuous <Continuous>  ursodiol Suspension 300 milliGRAM(s) Oral every 8 hours  venlafaxine XR. 150 milliGRAM(s) Oral two times a day    MEDICATIONS  (PRN):  LORazepam   Injectable 0.5 milliGRAM(s) IV Push at bedtime PRN Anxiety  ondansetron Injectable 4 milliGRAM(s) IV Push every 8 hours PRN Nausea and/or Vomiting  simethicone 80 milliGRAM(s) Chew daily PRN Gas        Vital Signs Last 24 Hrs  T(C): 37 (25 Nov 2023 09:00), Max: 38.3 (25 Nov 2023 02:39)  T(F): 98.6 (25 Nov 2023 09:00), Max: 101 (25 Nov 2023 02:39)  HR: 90 (25 Nov 2023 11:00) (90 - 106)  BP: 123/62 (25 Nov 2023 11:00) (82/49 - 161/66)  BP(mean): 85 (25 Nov 2023 11:00) (61 - 95)  RR: 17 (25 Nov 2023 11:00) (13 - 27)  SpO2: 100% (25 Nov 2023 11:00) (93% - 100%)    Parameters below as of 25 Nov 2023 11:00  Patient On (Oxygen Delivery Method): nasal cannula  O2 Flow (L/min): 2      11-24-23 @ 07:01  -  11-25-23 @ 07:00  --------------------------------------------------------  IN: 3980 mL / OUT: 3730 mL / NET: 250 mL    11-25-23 @ 07:01  -  11-25-23 @ 12:57  --------------------------------------------------------  IN: 1400 mL / OUT: 190 mL / NET: 1210 mL      PHYSICAL EXAM:  Constitutional: alert, NAD  Eyes: the sclera and conjunctiva were normal.   ENT: the ears and nose were normal in appearance.   Neck: the appearance of the neck was normal and the neck was supple.   Pulmonary: no respiratory distress and lungs were clear to auscultation bilaterally in anterior fields  Heart: heart rate was normal and rhythm regular, no murmur  Vascular:. there was no peripheral edema  Abdomen: soft, non-tender. Perc cony with bilious output. Fistula with overlying wound manager. R side ostomy.  Neurological: no focal deficits.   Psychiatric: the affect was normal  MSK: Prior RUE PICC site c/d/i. Bilateral TKA scars without any joint swelling/tenderness/decreased ROM          LABS:                        9.8    9.79  )-----------( 147      ( 25 Nov 2023 02:50 )             30.8     11-25    130<L>  |  99  |  58<H>  ----------------------------<  84  4.2   |  22  |  2.39<H>    Ca    8.6      25 Nov 2023 08:54  Phos  4.2     11-25  Mg     2.1     11-25    TPro  7.2  /  Alb  2.1<L>  /  TBili  8.8<H>  /  DBili  x   /  AST  145<H>  /  ALT  91<H>  /  AlkPhos  146<H>  11-25    PT/INR - ( 25 Nov 2023 08:52 )   PT: 20.5 sec;   INR: 1.83          PTT - ( 25 Nov 2023 08:52 )  PTT:69.6 sec  Urinalysis Basic - ( 25 Nov 2023 08:54 )    Color: x / Appearance: x / SG: x / pH: x  Gluc: 84 mg/dL / Ketone: x  / Bili: x / Urobili: x   Blood: x / Protein: x / Nitrite: x   Leuk Esterase: x / RBC: x / WBC x   Sq Epi: x / Non Sq Epi: x / Bacteria: x        MICROBIOLOGY:  Reviewed    RADIOLOGY & ADDITIONAL STUDIES:  Reviewed

## 2023-11-25 NOTE — PROGRESS NOTE ADULT - ASSESSMENT
78M with PMH of Crohn's, AFib/Flutter (s/p DCCVs, previously on amiodarone but now off of it for a few months), remote ileocectomy and open appendectomy (nearly 40 years ago). Patient was admitted this admission (06/23) for SBO vs. Crohn's flare, s/p NGT decompression and s/p lap TRE converted to open TRE, SBR x 3, left in discontinuity with abthera vac on (6/27), RTOR for ileocolic resection, small bowel anastomosis, and abdominal wall closure on (6/28), c/b fluid collection s/p IR aspiration of perihepatic fluid on (7/3), c/b wound dehiscence s/p RTOR exlap, washout, ileocolic resection with end ileostomy, blow hole colostomy, red rubber from ileostomy to small bowel anastomosis; vicryl bridging mesh on (7/5) transferred to SICU postoperatively for hemodynamic monitoring. Hospital course complicated by periods of AMS (most recently on 9/1 and associated with oliguria, severe ELVI and hyponatremia), which resolved. Stepped back up for to SICU on 9/10 for acute AMS, intermittent hypoglycemia, AFib with RVR. Percutaneous cholecystostomy placed on 9/11 for enlarged GB, PCT capped on 10/5. 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 Patient has been followed by Dr. Yuan from GI in the hospital. Hepatology team consulted for elevated liver enzymes and elevated bilirubin.    Chart reviewed and patient's liver enzymes began increasing mildly around early to mid September, and have been gradually increasing ever since then fairly proportionally.   - 09/05/23: AST/AT 44/31  - 09/18/23: AST/ALT 71/46  - 10/15/23: AST/ALT 80/90   - 11/01/23: AST//85    Difficult to pinpoint the exact cause of patient's elevated liver enzymes, but most likely elevated 2/2 cholestasis of illness vs. DILI (has received amiodarone, ceftriaxone, hydralazine during this admission) vs. 2/2 TPN vs. 2/2 possible irritation of the liver by perc cony drain catheter. CT abdomen/pelvis does not reveal any obstructive process.     CT abdomen/pelvis (11/01/23):   LIVER: Small cyst peripherally in the posterior segment of the right lobe. Tiny hypodensities in the right lobe at the dome too small to characterize and unchanged  BILE DUCTS: Normal caliber.  GALLBLADDER: Decompressed by a cholecystostomy tube  PANCREAS: Cystic foci are again seen. Mild dilatation of the pancreatic duct is also again noted  BOWEL: No bowel obstruction. Mild colonic diverticulosis without diverticulitis.  PERITONEUM: 2 abdominal drainage catheters are again appreciated with the tips in the mid to lower abdomen.  ABDOMINAL WALL: Midline anterior abdominal wall defects again seen. Ostomy again noted. Fat-containing left inguinal hernia.    Course complicated by fungemia, so patient was started on caspofungin. Bilirubin and liver enzymes continue increasing, now 8.8 and 145/91. Increase most likely 2/2 to worsening cholestasis of illness in the setting of new fungemia s/p TPN.     Recommendations:   - obtain repeat MRCP (last MRCP done 10/30/23)   - continue ursodiol 300 mg TID   - continue to trend CMP and fractionated bilirubin daily   - avoid hepatotoxic medications   - if cholestasis of illness, anticipate that liver enzymes will improve as patient improves clinically overall    Case discussed with Dr. Sin. Hepatology Team will continue to follow peripherally.     Esme Barber D.O.   Gastroenterology Fellow  Weekday 7am-5pm Pager: [phone]  Weeknights/Weekend/Holiday Coverage: Please call the  for contact info

## 2023-11-25 NOTE — PROGRESS NOTE ADULT - SUBJECTIVE AND OBJECTIVE BOX
Pt seen bedside. No complaints.   Percutaneous cholecystostomy tube with 355cc bilious output past 24 hr, previously 625 cc/24h. Flushed easily with 3 cc NS. Dressings CDI.

## 2023-11-25 NOTE — PROGRESS NOTE ADULT - SUBJECTIVE AND OBJECTIVE BOX
Hepatology Consult Progress Note:       OVERNIGHT EVENTS: AMRITA.    SUBJECTIVE / INTERVAL HPI:   Patient seen and examined at bedside. States that overall he feels a little worse and more fatigued, which he attributes to the fungemia. No acute complaints at this time.       VITAL SIGNS:  Vital Signs Last 24 Hrs  T(C): 37 (25 Nov 2023 09:00), Max: 38.3 (25 Nov 2023 02:39)  T(F): 98.6 (25 Nov 2023 09:00), Max: 101 (25 Nov 2023 02:39)  HR: 90 (25 Nov 2023 11:00) (90 - 106)  BP: 123/62 (25 Nov 2023 11:00) (82/49 - 161/66)  BP(mean): 85 (25 Nov 2023 11:00) (61 - 95)  RR: 17 (25 Nov 2023 11:00) (13 - 27)  SpO2: 100% (25 Nov 2023 11:00) (93% - 100%)    Parameters below as of 25 Nov 2023 11:00  Patient On (Oxygen Delivery Method): nasal cannula  O2 Flow (L/min): 2      11-24-23 @ 07:01  -  11-25-23 @ 07:00  --------------------------------------------------------  IN: 3980 mL / OUT: 3730 mL / NET: 250 mL    11-25-23 @ 07:01  -  11-25-23 @ 13:30  --------------------------------------------------------  IN: 1400 mL / OUT: 190 mL / NET: 1210 mL        PHYSICAL EXAM:  General: No acute distress  Lungs: Normal respiratory effort and no intercostal retractions  Cardiovascular: RRR  Abdomen: Soft, non-tender, non-distended, +gallbladder perc cony drain on R, +ostomy bag on right, +fistula bag on left with brown contents   Neurological: Alert and oriented x3  Skin: Warm and dry. No obvious rash      MEDICATIONS:  MEDICATIONS  (STANDING):  caspofungin IVPB 50 milliGRAM(s) IV Intermittent every 24 hours  chlorhexidine 0.12% Liquid 15 milliLiter(s) Swish and Spit two times a day  chlorhexidine 2% Cloths 1 Application(s) Topical <User Schedule>  cholestyramine Powder (Sugar-Free) 4 Gram(s) Oral daily  diphenoxylate/atropine 2 Tablet(s) Oral every 6 hours  epoetin matt-epbx (RETACRIT) Injectable 08609 Unit(s) SubCutaneous every 7 days  FIRST- Mouthwash  BLM 10 milliLiter(s) Swish and Spit three times a day  glucagon  Injectable 1 milliGRAM(s) IntraMuscular once  influenza  Vaccine (HIGH DOSE) 0.7 milliLiter(s) IntraMuscular once  levothyroxine Injectable 40 MICROGram(s) IV Push at bedtime  loperamide 4 milliGRAM(s) Oral every 6 hours  metoprolol tartrate Injectable 5 milliGRAM(s) IV Push every 6 hours  opium Tincture 6 milliGRAM(s) Oral every 6 hours  pantoprazole  Injectable 40 milliGRAM(s) IV Push daily  sodium chloride 0.9%. 1000 milliLiter(s) (100 mL/Hr) IV Continuous <Continuous>  ursodiol Suspension 300 milliGRAM(s) Oral every 8 hours  venlafaxine XR. 150 milliGRAM(s) Oral two times a day    MEDICATIONS  (PRN):  LORazepam   Injectable 0.5 milliGRAM(s) IV Push at bedtime PRN Anxiety  ondansetron Injectable 4 milliGRAM(s) IV Push every 8 hours PRN Nausea and/or Vomiting  simethicone 80 milliGRAM(s) Chew daily PRN Gas      ALLERGIES:  Allergies    penicillin (Angioedema)    Intolerances        LABS:                        9.8    9.79  )-----------( 147      ( 25 Nov 2023 02:50 )             30.8     11-25    130<L>  |  99  |  58<H>  ----------------------------<  84  4.2   |  22  |  2.39<H>    Ca    8.6      25 Nov 2023 08:54  Phos  4.2     11-25  Mg     2.1     11-25    TPro  7.2  /  Alb  2.1<L>  /  TBili  8.8<H>  /  DBili  x   /  AST  145<H>  /  ALT  91<H>  /  AlkPhos  146<H>  11-25    PT/INR - ( 25 Nov 2023 08:52 )   PT: 20.5 sec;   INR: 1.83          PTT - ( 25 Nov 2023 08:52 )  PTT:69.6 sec  Urinalysis Basic - ( 25 Nov 2023 08:54 )    Color: x / Appearance: x / SG: x / pH: x  Gluc: 84 mg/dL / Ketone: x  / Bili: x / Urobili: x   Blood: x / Protein: x / Nitrite: x   Leuk Esterase: x / RBC: x / WBC x   Sq Epi: x / Non Sq Epi: x / Bacteria: x      CAPILLARY BLOOD GLUCOSE  RADIOLOGY & ADDITIONAL TESTS: Reviewed.

## 2023-11-25 NOTE — PROGRESS NOTE ADULT - SUBJECTIVE AND OBJECTIVE BOX
O/N:  @MN, net negative 900cc, 500cc LR bolus. 0230 episode of rigors and anxiety, febrile to 101, Afib w/ HR in 120s. Tylenol started and AM labs sent however episode resolved without intervention. Soft SBP in 80s - 100 on recheck. UOP borderline. Additional 500cc NS bolus. STAT labs significant for Na 130 (135), uptrending BUN/Cr, T bili of 10.   11/24: PICC removed. bld cx from 11/22 grew candida glabrata, ID consulted. added caspo. ophthalmology consulted, normal ocular exam. dc cefepime and flagyl. echo done.     POST-OP DAY: X s/p      SUBJECTIVE: Patient seen and examined bedside by Surgical resident.    caspofungin IVPB 50 milliGRAM(s) IV Intermittent every 24 hours  enoxaparin Injectable 100 milliGRAM(s) SubCutaneous every 12 hours  metoprolol tartrate Injectable 5 milliGRAM(s) IV Push every 6 hours    MEDICATIONS  (PRN):  LORazepam   Injectable 0.5 milliGRAM(s) IV Push at bedtime PRN Anxiety  ondansetron Injectable 4 milliGRAM(s) IV Push every 8 hours PRN Nausea and/or Vomiting  simethicone 80 milliGRAM(s) Chew daily PRN Gas      I&O's Detail    24 Nov 2023 07:01  -  25 Nov 2023 07:00  --------------------------------------------------------  IN:    dextrose 10% + sodium chloride 0.45%: 1500 mL    IV PiggyBack: 50 mL    IV PiggyBack: 100 mL    IV PiggyBack: 250 mL    IV PiggyBack: 200 mL    Lactated Ringers Bolus: 500 mL    Oral Fluid: 880 mL    Sodium Chloride 0.9% Bolus: 500 mL  Total IN: 3980 mL    OUT:    Drain (mL): 2450 mL    Drain (mL): 355 mL    Drain (mL): 175 mL    Voided (mL): 750 mL  Total OUT: 3730 mL    Total NET: 250 mL      25 Nov 2023 07:01  -  25 Nov 2023 07:48  --------------------------------------------------------  IN:    sodium chloride 0.9%: 100 mL  Total IN: 100 mL    OUT:  Total OUT: 0 mL    Total NET: 100 mL          Vital Signs Last 24 Hrs  T(C): 36.9 (25 Nov 2023 05:48), Max: 38.3 (25 Nov 2023 02:39)  T(F): 98.4 (25 Nov 2023 05:48), Max: 101 (25 Nov 2023 02:39)  HR: 90 (25 Nov 2023 07:00) (90 - 106)  BP: 107/59 (25 Nov 2023 07:00) (77/44 - 161/66)  BP(mean): 80 (25 Nov 2023 07:00) (56 - 95)  RR: 16 (25 Nov 2023 07:00) (13 - 27)  SpO2: 100% (25 Nov 2023 07:00) (93% - 100%)    Parameters below as of 25 Nov 2023 07:00  Patient On (Oxygen Delivery Method): nasal cannula  O2 Flow (L/min): 2      General: NAD, resting comfortably in bed  C/V: NSR  Pulm: Nonlabored breathing, no respiratory distress  Abd: soft, NT/ND  Extrem: WWP, no edema, SCDs in place    LABS:                        9.8    9.79  )-----------( 147      ( 25 Nov 2023 02:50 )             30.8     11-25    130<L>  |  96  |  51<H>  ----------------------------<  98  4.6   |  22  |  1.76<H>    Ca    9.3      25 Nov 2023 02:50  Phos  4.2     11-25  Mg     2.1     11-25    TPro  8.7<H>  /  Alb  2.5<L>  /  TBili  10.3<H>  /  DBili  7.1<H>  /  AST  183<H>  /  ALT  112<H>  /  AlkPhos  172<H>  11-25      Urinalysis Basic - ( 25 Nov 2023 02:50 )    Color: x / Appearance: x / SG: x / pH: x  Gluc: 98 mg/dL / Ketone: x  / Bili: x / Urobili: x   Blood: x / Protein: x / Nitrite: x   Leuk Esterase: x / RBC: x / WBC x   Sq Epi: x / Non Sq Epi: x / Bacteria: x        RADIOLOGY & ADDITIONAL STUDIES:      Culture - Blood (collected 11-24-23 @ 10:20)  Source: .Blood Blood-Peripheral  Preliminary Report (11-25-23 @ 00:00):    No growth at 12 hours

## 2023-11-26 NOTE — PROGRESS NOTE ADULT - ASSESSMENT
78M w/ hx Crohn’s disease and prolonged hospitalization since 6/23, admitted for SBO s/p open TRE, SBR x 3, left in discontinuity with abthera, then RTOR for ileocolic resection and small bowel anastamosis with abdominal closure (6/28) c/b fluid collection/feculent peritonitis s/p IR aspiration (7/3) and then exlap, washout, ileocolic resection with end ileostomy, blow hole colostomy, red rubber from ileostomy to small bowel anastomosis, and vicryl bridging mesh (7/5) with abdominal cx at that time w/ VRE.casseliflavus, VRE.gallinarum E.faecium, Clostridium tertium, L.rhamnosus, C.albicans, then possible acalculous cholecystitis s/p perc cony (9/11) with negative bile fluid cx, then had CT with [facility]DEN GGOs for which received 7 days cefepime EOT 11/22, but then developed fevers up to 105 on 11/23, associated with hypotension, and rising leukocytosis to 12k, UA neg, BCx x2 with C.glabrata. RUE PICC line removed 11/24 (had been receiving TPN through this line).     # C.glabrata CLABSI (from RUE PICC for TPN)  - S/p removal of PICC 11/24. Surveillance BCx from shortly after PICC removal on 11/24 also positive, but second BCx from 11/24 and BCx from 11/25 ngtd  - Repeat blood cultures x2  - Continue caspofungin 50mg daily  - TTE neg 11/24 and ophtho exam negative 11/24    ID Team 1 will follow

## 2023-11-26 NOTE — PROVIDER CONTACT NOTE (HYPOGLYCEMIA EVENT) - NS PROVIDER CONTACT BACKGROUND-HYPO
Age: 78y    Gender: Male    POCT Blood Glucose:  57 mg/dL (08-24-23 @ 15:54)  49 mg/dL (08-24-23 @ 15:52)  77 mg/dL (08-24-23 @ 13:33)  81 mg/dL (08-24-23 @ 11:23)  78 mg/dL (08-24-23 @ 09:35)  82 mg/dL (08-24-23 @ 07:16)  146 mg/dL (08-24-23 @ 04:46)  74 mg/dL (08-24-23 @ 03:23)      eMAR:  atorvastatin   20 milliGRAM(s) Oral (08-23-23 @ 21:57)    cholestyramine Powder (Sugar-Free)   4 Gram(s) Oral (08-24-23 @ 10:05)   4 Gram(s) Oral (08-23-23 @ 23:35)    dextrose 50% Injectable   50 milliLiter(s) IV Push (08-24-23 @ 04:25)    dextrose 50% Injectable   25 milliLiter(s) IV Push (08-24-23 @ 00:28)    levothyroxine   50 MICROGram(s) Oral (08-24-23 @ 06:15)    
Age: 78y    Gender: Male    POCT Blood Glucose:  142 mg/dL (11-26-23 @ 05:36)  48 mg/dL (11-26-23 @ 05:18)    eMAR:  cholestyramine Powder (Sugar-Free)   4 Gram(s) Oral (11-25-23 @ 17:05)    levothyroxine Injectable   40 MICROGram(s) IV Push (11-25-23 @ 22:11)

## 2023-11-26 NOTE — PROGRESS NOTE ADULT - SUBJECTIVE AND OBJECTIVE BOX
S: No new issues/events overnight, no new med c/o    O: ICU Vital Signs Last 24 Hrs  T(F): 97.9 (11-26-23 @ 09:00), Max: 98.2 (11-26-23 @ 00:09)  HR: 92 (11-26-23 @ 12:00) (91 - 94)  BP: 141/72 (11-26-23 @ 12:00) (100/58 - 151/68)  BP(mean): 100 (11-26-23 @ 12:00) (76 - 104)  ABP: --  RR: 17 (11-26-23 @ 12:00) (14 - 20)  SpO2: 97% (11-26-23 @ 12:00) (95% - 100%)    PHYSICAL EXAM:   Neurological: AAOx3, CNII-XII intact,  strength 5/5 b/l  ENT: mucus membrane moist  Cardiovascular: RRR  Respiratory: CTA  Gastrointestinal: soft, NT, ND, right perc cony with bilious drainage. fistula with wound manager with grayish colored liquid and some formed content. wound bed pink, no bleeding. right sided ostomy with red rubber intact.   Extremities: warm, no dependent edema  Vascular: no cyanosis/erythema  Skin: no rashes  MSK: no joint swelling.       LABS:    11-26    133<L>  |  105  |  53<H>  ----------------------------<  53<LL>  4.4   |  19<L>  |  2.30<H>    Ca    8.8      26 Nov 2023 04:42  Phos  4.0     11-26  Mg     1.9     11-26    TPro  7.5  /  Alb  2.3<L>  /  TBili  8.8<H>  /  DBili  6.6<H>  /  AST  132<H>  /  ALT  87<H>  /  AlkPhos  158<H>  11-26  LIVER FUNCTIONS - ( 26 Nov 2023 04:42 )  Alb: 2.3 g/dL / Pro: 7.5 g/dL / ALK PHOS: 158 U/L / ALT: 87 U/L / AST: 132 U/L / GGT: x                               8.7    6.60  )-----------( 139      ( 26 Nov 2023 04:42 )             26.9   PT/INR - ( 26 Nov 2023 04:42 )   PT: 17.1 sec;   INR: 1.52          PTT - ( 26 Nov 2023 04:42 )  PTT:54.5 sec  Urinalysis Basic - ( 26 Nov 2023 04:42 )    Color: x / Appearance: x / SG: x / pH: x  Gluc: 53 mg/dL / Ketone: x  / Bili: x / Urobili: x   Blood: x / Protein: x / Nitrite: x   Leuk Esterase: x / RBC: x / WBC x   Sq Epi: x / Non Sq Epi: x / Bacteria: x    CAPILLARY BLOOD GLUCOSE      POCT Blood Glucose.: 98 mg/dL (26 Nov 2023 12:23)  POCT Blood Glucose.: 128 mg/dL (26 Nov 2023 11:09)  POCT Blood Glucose.: 53 mg/dL (26 Nov 2023 10:48)  POCT Blood Glucose.: 61 mg/dL (26 Nov 2023 10:47)  POCT Blood Glucose.: 142 mg/dL (26 Nov 2023 05:36)  POCT Blood Glucose.: 48 mg/dL (26 Nov 2023 05:18)    MEDICATIONS  (STANDING):  caspofungin IVPB 50 milliGRAM(s) IV Intermittent every 24 hours  chlorhexidine 2% Cloths 1 Application(s) Topical <User Schedule>  cholestyramine Powder (Sugar-Free) 4 Gram(s) Oral daily  dextrose 10% + sodium chloride 0.9%. 1000 milliLiter(s) (100 mL/Hr) IV Continuous <Continuous>  diphenoxylate/atropine 2 Tablet(s) Oral every 6 hours  epoetin matt-epbx (RETACRIT) Injectable 49522 Unit(s) SubCutaneous every 7 days  glucagon  Injectable 1 milliGRAM(s) IntraMuscular once  influenza  Vaccine (HIGH DOSE) 0.7 milliLiter(s) IntraMuscular once  levothyroxine Injectable 40 MICROGram(s) IV Push at bedtime  loperamide 4 milliGRAM(s) Oral every 6 hours  metoprolol tartrate Injectable 5 milliGRAM(s) IV Push every 6 hours  opium Tincture 6 milliGRAM(s) Oral every 6 hours  pantoprazole  Injectable 40 milliGRAM(s) IV Push daily  ursodiol Suspension 300 milliGRAM(s) Oral every 8 hours  venlafaxine XR. 150 milliGRAM(s) Oral two times a day    MEDICATIONS  (PRN):  LORazepam   Injectable 0.5 milliGRAM(s) IV Push at bedtime PRN Anxiety  ondansetron Injectable 4 milliGRAM(s) IV Push every 8 hours PRN Nausea and/or Vomiting  simethicone 80 milliGRAM(s) Chew daily PRN Gas      Poole:	  [x ] None	[ ] Daily Poole Order Placed	   Indication:	  [ ] Strict I and O's    [ ] Obstruction     [ ] Incontinence + Stage 3 or 4 Decubitus  Central Line:  [ x] None	   [ ]  Medication / TPN Administration     [ ] No Peripheral IV

## 2023-11-26 NOTE — PROGRESS NOTE ADULT - SUBJECTIVE AND OBJECTIVE BOX
INFECTIOUS DISEASES CONSULT FOLLOW-UP NOTE    INTERVAL HPI/OVERNIGHT EVENTS:  Afebrile, WBC normal, vitals normal  No new complaints    ROS:   Constitutional, eyes, ENT, cardiovascular, respiratory, gastrointestinal, genitourinary, integumentary, neurological, psychiatric and heme/lymph are otherwise negative other than noted above       ANTIBIOTICS/RELEVANT:    MEDICATIONS  (STANDING):  caspofungin IVPB 50 milliGRAM(s) IV Intermittent every 24 hours  chlorhexidine 2% Cloths 1 Application(s) Topical <User Schedule>  cholestyramine Powder (Sugar-Free) 4 Gram(s) Oral daily  dextrose 10% + sodium chloride 0.9%. 1000 milliLiter(s) (100 mL/Hr) IV Continuous <Continuous>  diphenoxylate/atropine 2 Tablet(s) Oral every 6 hours  epoetin matt-epbx (RETACRIT) Injectable 20988 Unit(s) SubCutaneous every 7 days  glucagon  Injectable 1 milliGRAM(s) IntraMuscular once  influenza  Vaccine (HIGH DOSE) 0.7 milliLiter(s) IntraMuscular once  levothyroxine Injectable 40 MICROGram(s) IV Push at bedtime  loperamide 4 milliGRAM(s) Oral every 6 hours  metoprolol tartrate Injectable 5 milliGRAM(s) IV Push every 6 hours  opium Tincture 6 milliGRAM(s) Oral every 6 hours  pantoprazole  Injectable 40 milliGRAM(s) IV Push daily  ursodiol Suspension 300 milliGRAM(s) Oral every 8 hours  venlafaxine XR. 150 milliGRAM(s) Oral two times a day    MEDICATIONS  (PRN):  LORazepam   Injectable 0.5 milliGRAM(s) IV Push at bedtime PRN Anxiety  ondansetron Injectable 4 milliGRAM(s) IV Push every 8 hours PRN Nausea and/or Vomiting  simethicone 80 milliGRAM(s) Chew daily PRN Gas        Vital Signs Last 24 Hrs  T(C): 36.4 (26 Nov 2023 17:05), Max: 36.8 (26 Nov 2023 00:09)  T(F): 97.5 (26 Nov 2023 17:05), Max: 98.2 (26 Nov 2023 00:09)  HR: 90 (26 Nov 2023 19:00) (90 - 94)  BP: 155/74 (26 Nov 2023 19:00) (100/58 - 161/80)  BP(mean): 106 (26 Nov 2023 19:00) (76 - 113)  RR: 18 (26 Nov 2023 19:00) (14 - 20)  SpO2: 94% (26 Nov 2023 19:00) (94% - 100%)    Parameters below as of 26 Nov 2023 09:00  Patient On (Oxygen Delivery Method): room air        11-25-23 @ 07:01  -  11-26-23 @ 07:00  --------------------------------------------------------  IN: 6800 mL / OUT: 6260 mL / NET: 540 mL    11-26-23 @ 07:01  -  11-26-23 @ 19:39  --------------------------------------------------------  IN: 3350 mL / OUT: 3245 mL / NET: 105 mL      PHYSICAL EXAM:  Constitutional: alert, NAD  Eyes: the sclera and conjunctiva were normal.   ENT: the ears and nose were normal in appearance.   Neck: the appearance of the neck was normal and the neck was supple.   Pulmonary: no respiratory distress and lungs were clear to auscultation bilaterally in anterior fields  Heart: heart rate was normal and rhythm regular, no murmur  Vascular:. there was no peripheral edema  Abdomen: soft, non-tender. Perc cony with bilious output. Fistula with overlying wound manager. R side ostomy.  Neurological: no focal deficits.   Psychiatric: the affect was normal  MSK: Prior RUE PICC site c/d/i. Bilateral TKA scars without any joint swelling/tenderness/decreased ROM        LABS:                        8.7    6.60  )-----------( 139      ( 26 Nov 2023 04:42 )             26.9     11-26    133<L>  |  105  |  53<H>  ----------------------------<  53<LL>  4.4   |  19<L>  |  2.30<H>    Ca    8.8      26 Nov 2023 04:42  Phos  4.0     11-26  Mg     1.9     11-26    TPro  7.5  /  Alb  2.3<L>  /  TBili  8.8<H>  /  DBili  6.6<H>  /  AST  132<H>  /  ALT  87<H>  /  AlkPhos  158<H>  11-26    PT/INR - ( 26 Nov 2023 04:42 )   PT: 17.1 sec;   INR: 1.52          PTT - ( 26 Nov 2023 04:42 )  PTT:54.5 sec  Urinalysis Basic - ( 26 Nov 2023 04:42 )    Color: x / Appearance: x / SG: x / pH: x  Gluc: 53 mg/dL / Ketone: x  / Bili: x / Urobili: x   Blood: x / Protein: x / Nitrite: x   Leuk Esterase: x / RBC: x / WBC x   Sq Epi: x / Non Sq Epi: x / Bacteria: x        MICROBIOLOGY:  Reviewed    RADIOLOGY & ADDITIONAL STUDIES:  Reviewed

## 2023-11-26 NOTE — PROGRESS NOTE ADULT - SUBJECTIVE AND OBJECTIVE BOX
SUBJECTIVE:  Flatus: [ ] YES [ ] NO             Bowel Movement: [ ] YES [ ] NO  Pain (0-10):            Pain Control Adequate: [ ] YES [ ] NO  Nausea: [ ] YES [ ] NO            Vomiting: [ ] YES [ ] NO  Diarrhea: [ ] YES [ ] NO         Constipation: [ ] YES [ ] NO     Chest Pain: [ ] YES [ ] NO    SOB:  [ ] YES [ ] NO    MEDICATIONS  (STANDING):  caspofungin IVPB 50 milliGRAM(s) IV Intermittent every 24 hours  chlorhexidine 0.12% Liquid 15 milliLiter(s) Swish and Spit two times a day  chlorhexidine 2% Cloths 1 Application(s) Topical <User Schedule>  cholestyramine Powder (Sugar-Free) 4 Gram(s) Oral daily  diphenoxylate/atropine 2 Tablet(s) Oral every 6 hours  epoetin matt-epbx (RETACRIT) Injectable 38726 Unit(s) SubCutaneous every 7 days  FIRST- Mouthwash  BLM 10 milliLiter(s) Swish and Spit three times a day  glucagon  Injectable 1 milliGRAM(s) IntraMuscular once  influenza  Vaccine (HIGH DOSE) 0.7 milliLiter(s) IntraMuscular once  levothyroxine Injectable 40 MICROGram(s) IV Push at bedtime  loperamide 4 milliGRAM(s) Oral every 6 hours  metoprolol tartrate Injectable 5 milliGRAM(s) IV Push every 6 hours  opium Tincture 6 milliGRAM(s) Oral every 6 hours  pantoprazole  Injectable 40 milliGRAM(s) IV Push daily  sodium chloride 0.9%. 1000 milliLiter(s) (100 mL/Hr) IV Continuous <Continuous>  ursodiol Suspension 300 milliGRAM(s) Oral every 8 hours  venlafaxine XR. 150 milliGRAM(s) Oral two times a day    MEDICATIONS  (PRN):  LORazepam   Injectable 0.5 milliGRAM(s) IV Push at bedtime PRN Anxiety  ondansetron Injectable 4 milliGRAM(s) IV Push every 8 hours PRN Nausea and/or Vomiting  simethicone 80 milliGRAM(s) Chew daily PRN Gas      Vital Signs Last 24 Hrs  T(C): 36.8 (26 Nov 2023 00:09), Max: 37 (25 Nov 2023 09:00)  T(F): 98.2 (26 Nov 2023 00:09), Max: 98.6 (25 Nov 2023 09:00)  HR: 93 (26 Nov 2023 06:10) (90 - 94)  BP: 111/56 (26 Nov 2023 06:10) (86/49 - 151/68)  BP(mean): 81 (26 Nov 2023 06:10) (65 - 104)  RR: 16 (26 Nov 2023 06:10) (13 - 24)  SpO2: 95% (26 Nov 2023 06:10) (95% - 100%)    Parameters below as of 26 Nov 2023 06:10  Patient On (Oxygen Delivery Method): room air        Physical Exam:  General: NAD, resting comfortably in bed  Pulmonary: Nonlabored breathing, no respiratory distress  Cardiovascular: NSR  Abdominal: soft, NT/ND  Extremities: WWP, normal strength  Neuro: A/O x 3, CNs II-XII grossly intact, no focal deficits, normal motor/sensation  Pulses: palpable distal pulses    I&O's Summary    24 Nov 2023 07:01  -  25 Nov 2023 07:00  --------------------------------------------------------  IN: 3980 mL / OUT: 3730 mL / NET: 250 mL    25 Nov 2023 07:01  -  26 Nov 2023 06:22  --------------------------------------------------------  IN: 5120 mL / OUT: 6260 mL / NET: -1140 mL        LABS:                        8.7    6.60  )-----------( 139      ( 26 Nov 2023 04:42 )             26.9     11-26    133<L>  |  105  |  53<H>  ----------------------------<  53<LL>  4.4   |  19<L>  |  2.30<H>    Ca    8.8      26 Nov 2023 04:42  Phos  4.0     11-26  Mg     1.9     11-26    TPro  7.5  /  Alb  2.3<L>  /  TBili  8.8<H>  /  DBili  6.6<H>  /  AST  132<H>  /  ALT  87<H>  /  AlkPhos  158<H>  11-26    PT/INR - ( 26 Nov 2023 04:42 )   PT: 17.1 sec;   INR: 1.52          PTT - ( 26 Nov 2023 04:42 )  PTT:54.5 sec  Urinalysis Basic - ( 26 Nov 2023 04:42 )    Color: x / Appearance: x / SG: x / pH: x  Gluc: 53 mg/dL / Ketone: x  / Bili: x / Urobili: x   Blood: x / Protein: x / Nitrite: x   Leuk Esterase: x / RBC: x / WBC x   Sq Epi: x / Non Sq Epi: x / Bacteria: x      CAPILLARY BLOOD GLUCOSE      POCT Blood Glucose.: 142 mg/dL (26 Nov 2023 05:36)  POCT Blood Glucose.: 48 mg/dL (26 Nov 2023 05:18)    LIVER FUNCTIONS - ( 26 Nov 2023 04:42 )  Alb: 2.3 g/dL / Pro: 7.5 g/dL / ALK PHOS: 158 U/L / ALT: 87 U/L / AST: 132 U/L / GGT: x             RADIOLOGY & ADDITIONAL STUDIES:       SUBJECTIVE:  Patient was evaluated at bedside this AM. Patient was resting peacefully in bed this AM.    MEDICATIONS  (STANDING):  caspofungin IVPB 50 milliGRAM(s) IV Intermittent every 24 hours  chlorhexidine 0.12% Liquid 15 milliLiter(s) Swish and Spit two times a day  chlorhexidine 2% Cloths 1 Application(s) Topical <User Schedule>  cholestyramine Powder (Sugar-Free) 4 Gram(s) Oral daily  diphenoxylate/atropine 2 Tablet(s) Oral every 6 hours  epoetin matt-epbx (RETACRIT) Injectable 51227 Unit(s) SubCutaneous every 7 days  FIRST- Mouthwash  BLM 10 milliLiter(s) Swish and Spit three times a day  glucagon  Injectable 1 milliGRAM(s) IntraMuscular once  influenza  Vaccine (HIGH DOSE) 0.7 milliLiter(s) IntraMuscular once  levothyroxine Injectable 40 MICROGram(s) IV Push at bedtime  loperamide 4 milliGRAM(s) Oral every 6 hours  metoprolol tartrate Injectable 5 milliGRAM(s) IV Push every 6 hours  opium Tincture 6 milliGRAM(s) Oral every 6 hours  pantoprazole  Injectable 40 milliGRAM(s) IV Push daily  sodium chloride 0.9%. 1000 milliLiter(s) (100 mL/Hr) IV Continuous <Continuous>  ursodiol Suspension 300 milliGRAM(s) Oral every 8 hours  venlafaxine XR. 150 milliGRAM(s) Oral two times a day    MEDICATIONS  (PRN):  LORazepam   Injectable 0.5 milliGRAM(s) IV Push at bedtime PRN Anxiety  ondansetron Injectable 4 milliGRAM(s) IV Push every 8 hours PRN Nausea and/or Vomiting  simethicone 80 milliGRAM(s) Chew daily PRN Gas      Vital Signs Last 24 Hrs  T(C): 36.8 (26 Nov 2023 00:09), Max: 37 (25 Nov 2023 09:00)  T(F): 98.2 (26 Nov 2023 00:09), Max: 98.6 (25 Nov 2023 09:00)  HR: 93 (26 Nov 2023 06:10) (90 - 94)  BP: 111/56 (26 Nov 2023 06:10) (86/49 - 151/68)  BP(mean): 81 (26 Nov 2023 06:10) (65 - 104)  RR: 16 (26 Nov 2023 06:10) (13 - 24)  SpO2: 95% (26 Nov 2023 06:10) (95% - 100%)    Parameters below as of 26 Nov 2023 06:10  Patient On (Oxygen Delivery Method): room air        Physical Exam:  General: NAD, resting comfortably in bed  Pulmonary: Nonlabored breathing, no respiratory distress  Cardiovascular: NSR  Abdominal: soft, NT/ND; right perc cony with bilious drainage. fistula with wound manager with liquid and some formed content. wound bed pink, no bleeding. right sided ostomy with red rubber intact.   Extremities: WWP  Neuro: A/O x 3, CNs II-XII grossly intact  Pulses: palpable distal pulses    I&O's Summary    24 Nov 2023 07:01  -  25 Nov 2023 07:00  --------------------------------------------------------  IN: 3980 mL / OUT: 3730 mL / NET: 250 mL    25 Nov 2023 07:01  -  26 Nov 2023 06:22  --------------------------------------------------------  IN: 5120 mL / OUT: 6260 mL / NET: -1140 mL        LABS:                        8.7    6.60  )-----------( 139      ( 26 Nov 2023 04:42 )             26.9     11-26    133<L>  |  105  |  53<H>  ----------------------------<  53<LL>  4.4   |  19<L>  |  2.30<H>    Ca    8.8      26 Nov 2023 04:42  Phos  4.0     11-26  Mg     1.9     11-26    TPro  7.5  /  Alb  2.3<L>  /  TBili  8.8<H>  /  DBili  6.6<H>  /  AST  132<H>  /  ALT  87<H>  /  AlkPhos  158<H>  11-26    PT/INR - ( 26 Nov 2023 04:42 )   PT: 17.1 sec;   INR: 1.52          PTT - ( 26 Nov 2023 04:42 )  PTT:54.5 sec  Urinalysis Basic - ( 26 Nov 2023 04:42 )    Color: x / Appearance: x / SG: x / pH: x  Gluc: 53 mg/dL / Ketone: x  / Bili: x / Urobili: x   Blood: x / Protein: x / Nitrite: x   Leuk Esterase: x / RBC: x / WBC x   Sq Epi: x / Non Sq Epi: x / Bacteria: x      CAPILLARY BLOOD GLUCOSE      POCT Blood Glucose.: 142 mg/dL (26 Nov 2023 05:36)  POCT Blood Glucose.: 48 mg/dL (26 Nov 2023 05:18)    LIVER FUNCTIONS - ( 26 Nov 2023 04:42 )  Alb: 2.3 g/dL / Pro: 7.5 g/dL / ALK PHOS: 158 U/L / ALT: 87 U/L / AST: 132 U/L / GGT: x             RADIOLOGY & ADDITIONAL STUDIES:

## 2023-11-26 NOTE — PROGRESS NOTE ADULT - ASSESSMENT
78M w PMH Crohn's, AFib/Flutter s/p DCCVs on amiodarone, remote ileocectomy and open appendectomy. Admitted (6/23) for SBO vs Crohns flare, s/p NGT decompression and s/p lap converted to open TRE, SBR x 3, left in discontinuity with abthera vac on (6/27), RTOR for ileocolic resection, small bowel anastomosis, and abdominal wall closure on (6/28), c/b fluid collection s/p IR aspiration of perihepatic fluid on (7/3), c/b wound dehiscence s/p RTOR exlap, washout, ileocolic resection with end ileostomy, blow hole colostomy, red rubber from ileostomy to small bowel anastomosis; vicryl bridging mesh on (7/5) transferred to SICU postoperatively for hemodynamic monitoring, with hospital course complicated by periods of severe ELVI and hyponatremia, which resolved but stepped back up for to SICU on (9/10) for acute AMS, intermittent hypoglycemia, AFib with RVR. Percutaneous Cholecystostomy placed on (9/11) for enlarged GB, PCT capped 10/5 and uncapped 10/25 for hyperbilirubinemia, Right brachial DVT, left basillic and cephalic superficial thrombus on duplex 11/2, CT 11/14 with ALDEN ground glass opacity of unclear etiology, completed empiric 7day cefepime course 11/22, rising T-bili of unclear etilogy, now sepsis w/ fungemia (mehran glabrata), likely CLABSI.       Continue with ID recs  Rest of care per SICU

## 2023-11-26 NOTE — PROGRESS NOTE ADULT - ASSESSMENT
78M w PMH Crohn's, AFib/Flutter s/p DCCVs on amiodarone, remote ileocectomy and open appendectomy. Admitted (6/23) for SBO vs Crohns flare, s/p NGT decompression and s/p lap converted to open TRE, SBR x 3, left in discontinuity with abthera vac on (6/27), RTOR for ileocolic resection, small bowel anastomosis, and abdominal wall closure on (6/28), c/b fluid collection s/p IR aspiration of perihepatic fluid on (7/3), c/b wound dehiscence s/p RTOR exlap, washout, ileocolic resection with end ileostomy, blow hole colostomy, red rubber from ileostomy to small bowel anastomosis; vicryl bridging mesh on (7/5) transferred to SICU postoperatively for hemodynamic monitoring, with hospital course complicated by periods of severe ELVI and hyponatremia, which resolved but stepped back up for to SICU on (9/10) for acute AMS, intermittent hypoglycemia, AFib with RVR. Percutaneous Cholecystostomy placed on (9/11) for enlarged GB, PCT capped 10/5 and uncapped 10/25 for hyperbilirubinemia, Right brachial DVT, left basillic and cephalic superficial thrombus on duplex 11/2, CT 11/14 with ALDEN ground glass opacity of unclear etiology, completed empiric 7day cefepime course 11/22, rising T-bili of unclear etilogy, now sepsis w/ fungemia (mehran glabrata), likely CLABSI.       NEURO: initial hospital course with AMS (resolved), EEG neg. Hx of depression - cont Effexor. Insomnia: Melatonin PRN. Nausea: Zofran PRN. Anxiety: Lorazepam PRN.   HEENT: Cepacol prn  CV: Hx Afib/flutter: s/p DCCV, Amio stopped due to persistent transaminitis. Continue Metoprolol 5q6 with hold parameters. Hx HLD: Lipitor held given high LFTs. TTE  (7/18) - PASP 64mmHg, EF 65-70%,  Hx HTN -  BP ok without Norvasc and Losartan.   PULM: CT from 11/14 with ALDEN ground glass opacity of unclear etiology. COVID negative. RVP negative. completed 7d empiric cefepime 11/22 to cover for potential PNA.   GI/FEN: High protein diet. Cont Ensure max x1/day. 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 high output EC fistula: cont Octreotide, Imodium, Lomotil, Tincture of Opium, Cholestyramine 10/18. Transaminitis elevated T bili of unclear origin,  s/p Perc Deb on 9/11; MRCP 10/30 no obstruction, seen by Hepatology started on ursodiol, will repeat RUQ US, consider repeat MRCP, cont PPI. monitor drainage from fistula, bolus as needed to keep I&O even. Ensure once daily with LPS. getting NS@100 for now while holding TPN  : Voids. ELVI, Cr up to 2.39. still making urine. follow closely.   ENDO: Hx hypothyroid: IV Synthroid, TSH wnl on 10/23  ID: chest CT with ALDEN with ground glass opacities, unclear etiology. completed empiric 7days cefepime (11/15-11/22), bld cx from 11/22 grew candida glabrata. likely CLABSI. PICC removed 11/24. ID consulted. added caspofungin (11/24--) . sent surveillance blood cx today. ophthalmology evaulated - normal ocular exam. echo no vegetation, off cefepime/flagyl. recent MRSA swab negative, RVP panel negative, COVID negative this past week. Cont Nystatin powder // PREVIOUSLY had C. tertium, Lactobacillis from his IR cx 7/3, and candida albicans, lactobacillus, vanc sensitive E faecium, vanc resistant E gallinarum, vanc resistant E casseliflavus, lactobacillus from his OR cx 7/5. Completed course of abx with Imipenem (9/10-9/15). Imipenem (8/26--) imipenem (6/30-7/12, 7/23-7/30), Dapto (6/30-7/5 and 7/23-7/24). Empiric dapto (8/23-24) and cefepime (8/23-24).   PPX: SCDs, was on Therapeutic lovenox bid  for R brachial vein DVT, will switch to hep gtts in setting of ELVI. (will start when ptt becomes lower),   HEME: Anemia: continue Epogen weekly. S/p Iron Sucrose 200 qd x 3 days for chronic anemia.  LINES: PIVs, ///DC'd: ZAHEER PICC (9/1-11/1 ), HOLLIS PICC: (11/1-11/24)   WOUNDS/DRAINS: Abdominal wound and fistula with wound manager in place dressing change Tuesday and Friday. RLQ Ostomy. IR perc deb. // DC: L Clay drain.  PT: Ambulate as tolerated   DISPO: SICU due to complicated hospital course.     78M w PMH Crohn's, AFib/Flutter s/p DCCVs on amiodarone, remote ileocectomy and open appendectomy. Admitted (6/23) for SBO vs Crohns flare, s/p NGT decompression and s/p lap converted to open TRE, SBR x 3, left in discontinuity with abthera vac on (6/27), RTOR for ileocolic resection, small bowel anastomosis, and abdominal wall closure on (6/28), c/b fluid collection s/p IR aspiration of perihepatic fluid on (7/3), c/b wound dehiscence s/p RTOR exlap, washout, ileocolic resection with end ileostomy, blow hole colostomy, red rubber from ileostomy to small bowel anastomosis; vicryl bridging mesh on (7/5) transferred to SICU postoperatively for hemodynamic monitoring, with hospital course complicated by periods of severe ELVI and hyponatremia, which resolved but stepped back up for to SICU on (9/10) for acute AMS, intermittent hypoglycemia, AFib with RVR. Percutaneous Cholecystostomy placed on (9/11) for enlarged GB, PCT capped 10/5 and uncapped 10/25 for hyperbilirubinemia, Right brachial DVT, left basillic and cephalic superficial thrombus on duplex 11/2, CT 11/14 with ALDEN ground glass opacity of unclear etiology, completed empiric 7day cefepime course 11/22, rising T-bili of unclear etilogy, now sepsis w/ fungemia (mehran glabrata), likely CLABSI.       NEURO: initial hospital course with AMS (resolved), EEG neg. Hx of depression - cont Effexor. Insomnia: Melatonin PRN. Nausea: Zofran PRN. Anxiety: Lorazepam PRN.   HEENT: Cepacol prn  CV: Hx Afib/flutter: s/p DCCV, Amio stopped due to persistent transaminitis. Continue Metoprolol 5q6 with hold parameters. Hx HLD: Lipitor held given high LFTs. TTE  (7/18) - PASP 64mmHg, EF 65-70%,  Hx HTN -  BP ok without Norvasc and Losartan.   PULM: CT from 11/14 with ALDEN ground glass opacity of unclear etiology. COVID negative. RVP negative. completed 7d empiric cefepime 11/22 to cover for potential PNA.   GI/FEN: High protein diet. Cont Ensure max x1/day. 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 high output EC fistula: cont Octreotide, Imodium, Lomotil, Tincture of Opium, Cholestyramine 10/18. Transaminitis elevated T bili of unclear origin,  s/p Perc Deb on 9/11; MRCP 10/30 no obstruction, seen by Hepatology started on ursodiol, RUQ US unremarkable, hepatic vessels patent. consider repeat MRCP, cont PPI. monitor drainage from fistula, bolus as needed to keep I&O even. Ensure once daily with LPS. getting NS@100 for now while holding TPN  : Voids. ELVI, Cr up to 2.39. still making urine. follow closely.   ENDO: Hx hypothyroid: IV Synthroid, TSH wnl on 10/23  ID: chest CT with ALDEN with ground glass opacities, unclear etiology. completed empiric 7days cefepime (11/15-11/22), bld cx from 11/22 grew candida glabrata. likely CLABSI. PICC removed 11/24. ID consulted. added caspofungin (11/24--) . sent surveillance blood cx today. ophthalmology evaulated - normal ocular exam. echo no vegetation, off cefepime/flagyl. recent MRSA swab negative, RVP panel negative, COVID negative this past week. Cont Nystatin powder // PREVIOUSLY had C. tertium, Lactobacillis from his IR cx 7/3, and candida albicans, lactobacillus, vanc sensitive E faecium, vanc resistant E gallinarum, vanc resistant E casseliflavus, lactobacillus from his OR cx 7/5. Completed course of abx with Imipenem (9/10-9/15). Imipenem (8/26--) imipenem (6/30-7/12, 7/23-7/30), Dapto (6/30-7/5 and 7/23-7/24). Empiric dapto (8/23-24) and cefepime (8/23-24).   PPX: SCDs, was on Therapeutic lovenox bid  for R brachial vein DVT, will switch to hep gtts in setting of ELVI. (will start when ptt becomes lower),   HEME: Anemia: continue Epogen weekly. S/p Iron Sucrose 200 qd x 3 days for chronic anemia.  LINES: PIVs, ///DC'd: ZAHEER PICC (9/1-11/1 ), HOLLIS PICC: (11/1-11/24)   WOUNDS/DRAINS: Abdominal wound and fistula with wound manager in place dressing change Tuesday and Friday. RLQ Ostomy. IR perc deb. // DC: L Clay drain.  PT: Ambulate as tolerated   DISPO: SICU due to complicated hospital course.     78M w PMH Crohn's, AFib/Flutter s/p DCCVs on amiodarone, remote ileocectomy and open appendectomy. Admitted (6/23) for SBO vs Crohns flare, s/p NGT decompression and s/p lap converted to open TRE, SBR x 3, left in discontinuity with abthera vac on (6/27), RTOR for ileocolic resection, small bowel anastomosis, and abdominal wall closure on (6/28), c/b fluid collection s/p IR aspiration of perihepatic fluid on (7/3), c/b wound dehiscence s/p RTOR exlap, washout, ileocolic resection with end ileostomy, blow hole colostomy, red rubber from ileostomy to small bowel anastomosis; vicryl bridging mesh on (7/5) transferred to SICU postoperatively for hemodynamic monitoring, with hospital course complicated by periods of severe ELVI and hyponatremia, which resolved but stepped back up for to SICU on (9/10) for acute AMS, intermittent hypoglycemia, AFib with RVR. Percutaneous Cholecystostomy placed on (9/11) for enlarged GB, PCT capped 10/5 and uncapped 10/25 for hyperbilirubinemia, Right brachial DVT, left basillic and cephalic superficial thrombus on duplex 11/2, CT 11/14 with ALDEN ground glass opacity of unclear etiology, completed empiric 7day cefepime course 11/22, rising T-bili of unclear etilogy, now sepsis w/ fungemia (mehran glabrata), likely CLABSI.       NEURO: initial hospital course with AMS (resolved), EEG neg. Hx of depression - cont Effexor. Insomnia: Melatonin PRN. Nausea: Zofran PRN. Anxiety: Lorazepam PRN.   HEENT: Cepacol prn  CV: Hx Afib/flutter: s/p DCCV, Amio stopped due to persistent transaminitis. Continue Metoprolol 5q6 with hold parameters. Hx HLD: Lipitor held given high LFTs. TTE  (7/18) - PASP 64mmHg, EF 65-70%,  Hx HTN -  BP ok without Norvasc and Losartan.   PULM: CT from 11/14 with ALDEN ground glass opacity of unclear etiology. COVID negative. RVP negative. completed 7d empiric cefepime 11/22 to cover for potential PNA.   GI/FEN: High protein diet. Cont Ensure max x1/day. 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 high output EC fistula: cont Octreotide, Imodium, Lomotil, Tincture of Opium, Cholestyramine 10/18. Transaminitis elevated T bili of unclear origin,  s/p Perc Deb on 9/11; MRCP 10/30 no obstruction, seen by Hepatology started on ursodiol, RUQ US yesterday CBD only 5mm, hepatic vessels patent. consider repeat MRCP, cont PPI. monitor drainage from fistula, bolus as needed to keep I&O even. Ensure once daily with LPS. getting NS@100 for now while holding TPN  : Voids. ELVI, Cr peaked at 2.39. trended down this morning. still making urine. follow closely.   ENDO: Hx hypothyroid: IV Synthroid, TSH wnl on 10/23  ID: chest CT with ALDEN with ground glass opacities, unclear etiology. completed empiric 7days cefepime (11/15-11/22), bld cx from 11/22 grew candida glabrata. likely CLABSI. PICC removed 11/24. ID consulted. added caspofungin (11/24--) . repeat surveillance blood cx again today. ophthalmology evaulated - normal ocular exam. echo no vegetation, off cefepime/flagyl. recent MRSA swab negative, RVP panel negative, COVID negative this past week. Cont Nystatin powder // PREVIOUSLY had C. tertium, Lactobacillis from his IR cx 7/3, and candida albicans, lactobacillus, vanc sensitive E faecium, vanc resistant E gallinarum, vanc resistant E casseliflavus, lactobacillus from his OR cx 7/5. Completed course of abx with Imipenem (9/10-9/15). Imipenem (8/26--) imipenem (6/30-7/12, 7/23-7/30), Dapto (6/30-7/5 and 7/23-7/24). Empiric dapto (8/23-24) and cefepime (8/23-24).   PPX: SCDs, was on Therapeutic lovenox bid  for R brachial vein DVT, will switch to hep gtts in setting of ELVI. (will start when ptt becomes lower),   HEME: Anemia: continue Epogen weekly. S/p Iron Sucrose 200 qd x 3 days for chronic anemia.  LINES: PIVs, ///DC'd: ZAHEER PICC (9/1-11/1 ), HOLLIS PICC: (11/1-11/24)   WOUNDS/DRAINS: Abdominal wound and fistula with wound manager in place dressing change Tuesday and Friday. RLQ Ostomy. IR perc deb. // DC: L Clay drain.  PT: Ambulate as tolerated   DISPO: SICU due to complicated hospital course.

## 2023-11-27 NOTE — PROGRESS NOTE ADULT - SUBJECTIVE AND OBJECTIVE BOX
S: No new issues/events overnight, no new med c/o    O: ICU Vital Signs Last 24 Hrs  T(F): 98.2 (11-27-23 @ 09:32), Max: 98.8 (11-27-23 @ 06:04)  HR: 92 (11-27-23 @ 10:00) (90 - 94)  BP: 163/73 (11-27-23 @ 10:00) (112/55 - 175/79)  BP(mean): 105 (11-27-23 @ 10:00) (78 - 117)  ABP: --  RR: 18 (11-27-23 @ 10:00) (15 - 23)  SpO2: 99% (11-27-23 @ 10:00) (91% - 100%)    PHYSICAL EXAM:   Neurological: AAOx3, CNII-XII intact,  strength 5/5 b/l  ENT: mucus membrane moist  Cardiovascular: RRR  Respiratory: CTA  Gastrointestinal: soft, NT, ND, right perc cony with bilious drainage. fistula with wound manager with grayish colored liquid and some formed content. wound bed pink, no bleeding. right sided ostomy with red rubber intact.   Extremities: warm, no dependent edema  Vascular: no cyanosis/erythema  Skin: no rashes  MSK: no joint swelling.       LABS:    11-27    134<L>  |  107  |  48<H>  ----------------------------<  103<H>  4.4   |  17<L>  |  2.00<H>    Ca    8.8      27 Nov 2023 05:36  Phos  3.4     11-27  Mg     1.7     11-27    TPro  7.5  /  Alb  2.1<L>  /  TBili  7.9<H>  /  DBili  6.0<H>  /  AST  117<H>  /  ALT  79<H>  /  AlkPhos  178<H>  11-27  LIVER FUNCTIONS - ( 27 Nov 2023 04:55 )  Alb: 2.1 g/dL / Pro: 7.5 g/dL / ALK PHOS: 178 U/L / ALT: 79 U/L / AST: 117 U/L / GGT: x                               9.1    7.67  )-----------( 145      ( 27 Nov 2023 05:36 )             28.4   PT/INR - ( 27 Nov 2023 04:55 )   PT: 14.4 sec;   INR: 1.27          PTT - ( 27 Nov 2023 04:55 )  PTT:42.6 sec  Urinalysis Basic - ( 27 Nov 2023 05:36 )    Color: x / Appearance: x / SG: x / pH: x  Gluc: 103 mg/dL / Ketone: x  / Bili: x / Urobili: x   Blood: x / Protein: x / Nitrite: x   Leuk Esterase: x / RBC: x / WBC x   Sq Epi: x / Non Sq Epi: x / Bacteria: x    CAPILLARY BLOOD GLUCOSE      POCT Blood Glucose.: 96 mg/dL (27 Nov 2023 01:11)  POCT Blood Glucose.: 89 mg/dL (26 Nov 2023 17:20)  POCT Blood Glucose.: 98 mg/dL (26 Nov 2023 12:23)  POCT Blood Glucose.: 128 mg/dL (26 Nov 2023 11:09)  POCT Blood Glucose.: 53 mg/dL (26 Nov 2023 10:48)  POCT Blood Glucose.: 61 mg/dL (26 Nov 2023 10:47)    MEDICATIONS  (STANDING):  caspofungin IVPB 50 milliGRAM(s) IV Intermittent every 24 hours  chlorhexidine 2% Cloths 1 Application(s) Topical <User Schedule>  cholestyramine Powder (Sugar-Free) 4 Gram(s) Oral daily  dextrose 10% + sodium chloride 0.9%. 1000 milliLiter(s) (100 mL/Hr) IV Continuous <Continuous>  diphenoxylate/atropine 2 Tablet(s) Oral every 6 hours  epoetin matt-epbx (RETACRIT) Injectable 62888 Unit(s) SubCutaneous every 7 days  glucagon  Injectable 1 milliGRAM(s) IntraMuscular once  influenza  Vaccine (HIGH DOSE) 0.7 milliLiter(s) IntraMuscular once  levothyroxine Injectable 40 MICROGram(s) IV Push at bedtime  loperamide 4 milliGRAM(s) Oral every 6 hours  metoprolol tartrate Injectable 5 milliGRAM(s) IV Push every 6 hours  opium Tincture 6 milliGRAM(s) Oral every 6 hours  pantoprazole  Injectable 40 milliGRAM(s) IV Push daily  ursodiol Suspension 300 milliGRAM(s) Oral every 8 hours  venlafaxine XR. 150 milliGRAM(s) Oral two times a day    MEDICATIONS  (PRN):  LORazepam   Injectable 0.5 milliGRAM(s) IV Push at bedtime PRN Anxiety  ondansetron Injectable 4 milliGRAM(s) IV Push every 8 hours PRN Nausea and/or Vomiting  simethicone 80 milliGRAM(s) Chew daily PRN Gas      Poole:	  [x ] None	[ ] Daily Poole Order Placed	   Indication:	  [ ] Strict I and O's    [ ] Obstruction     [ ] Incontinence + Stage 3 or 4 Decubitus  Central Line:  [x ] None	   [ ]  Medication / TPN Administration     [ ] No Peripheral IV

## 2023-11-27 NOTE — PROGRESS NOTE ADULT - SUBJECTIVE AND OBJECTIVE BOX
SUBJECTIVE:   Patient seen and examined at bedside this AM   Over the weekend patient was septic w/ c/f infected PICC line site. PICC line d/c'd and cultures (line and blood) grew candida gabralta for which Caspo was started. TPN d/c'd; severe hypoglycemia - D10 NS started. High fistula output requiring boluses for dehydration. Developed ELVI    No acute distress this AM. Resting comfortably without any complaints. Tolerating diet       MEDICATIONS  (STANDING):  caspofungin IVPB 50 milliGRAM(s) IV Intermittent every 24 hours  chlorhexidine 2% Cloths 1 Application(s) Topical <User Schedule>  cholestyramine Powder (Sugar-Free) 4 Gram(s) Oral daily  dextrose 10% + sodium chloride 0.9%. 1000 milliLiter(s) (100 mL/Hr) IV Continuous <Continuous>  diphenoxylate/atropine 2 Tablet(s) Oral every 6 hours  epoetin matt-epbx (RETACRIT) Injectable 59095 Unit(s) SubCutaneous every 7 days  glucagon  Injectable 1 milliGRAM(s) IntraMuscular once  influenza  Vaccine (HIGH DOSE) 0.7 milliLiter(s) IntraMuscular once  levothyroxine Injectable 40 MICROGram(s) IV Push at bedtime  loperamide 4 milliGRAM(s) Oral every 6 hours  metoprolol tartrate Injectable 5 milliGRAM(s) IV Push every 6 hours  octreotide  Injectable 100 MICROGram(s) IV Push three times a day  opium Tincture 6 milliGRAM(s) Oral every 6 hours  pantoprazole  Injectable 40 milliGRAM(s) IV Push daily  ursodiol Suspension 300 milliGRAM(s) Oral every 8 hours  venlafaxine XR. 150 milliGRAM(s) Oral two times a day    MEDICATIONS  (PRN):  LORazepam   Injectable 0.5 milliGRAM(s) IV Push at bedtime PRN Anxiety  ondansetron Injectable 4 milliGRAM(s) IV Push every 8 hours PRN Nausea and/or Vomiting  simethicone 80 milliGRAM(s) Chew daily PRN Gas      Vital Signs Last 24 Hrs  T(C): 36.7 (27 Nov 2023 13:19), Max: 37.1 (27 Nov 2023 06:04)  T(F): 98.1 (27 Nov 2023 13:19), Max: 98.8 (27 Nov 2023 06:04)  HR: 91 (27 Nov 2023 13:00) (90 - 94)  BP: 153/69 (27 Nov 2023 13:00) (112/55 - 175/79)  BP(mean): 99 (27 Nov 2023 13:00) (78 - 117)  RR: 19 (27 Nov 2023 13:00) (16 - 23)  SpO2: 100% (27 Nov 2023 13:00) (91% - 100%)    Parameters below as of 27 Nov 2023 13:00  Patient On (Oxygen Delivery Method): room air      Physical Exam:  General: NAD, resting comfortably in bed  Pulmonary: Nonlabored breathing, no respiratory distress  Cardiovascular: NSR  Abdominal: soft, NT/ND; right perc cony with bilious drainage. fistula with wound manager with liquid and some formed content. wound bed pink, no bleeding. right sided ostomy with red rubber intact.   Extremities: WWP  Neuro: A/O x 3, CNs II-XII grossly intact  Pulses: palpable distal pulses      I&O's Summary    26 Nov 2023 07:01  -  27 Nov 2023 07:00  --------------------------------------------------------  IN: 5350 mL / OUT: 5620 mL / NET: -270 mL    27 Nov 2023 07:01  -  27 Nov 2023 14:09  --------------------------------------------------------  IN: 550 mL / OUT: 1340 mL / NET: -790 mL        LABS:                        9.1    7.67  )-----------( 145      ( 27 Nov 2023 05:36 )             28.4     11-27    134<L>  |  107  |  48<H>  ----------------------------<  103<H>  4.4   |  17<L>  |  2.00<H>    Ca    8.8      27 Nov 2023 05:36  Phos  3.4     11-27  Mg     1.7     11-27    TPro  7.5  /  Alb  2.1<L>  /  TBili  7.9<H>  /  DBili  6.0<H>  /  AST  117<H>  /  ALT  79<H>  /  AlkPhos  178<H>  11-27    PT/INR - ( 27 Nov 2023 04:55 )   PT: 14.4 sec;   INR: 1.27          PTT - ( 27 Nov 2023 04:55 )  PTT:42.6 sec  Urinalysis Basic - ( 27 Nov 2023 05:36 )    Color: x / Appearance: x / SG: x / pH: x  Gluc: 103 mg/dL / Ketone: x  / Bili: x / Urobili: x   Blood: x / Protein: x / Nitrite: x   Leuk Esterase: x / RBC: x / WBC x   Sq Epi: x / Non Sq Epi: x / Bacteria: x      CAPILLARY BLOOD GLUCOSE      POCT Blood Glucose.: 96 mg/dL (27 Nov 2023 01:11)  POCT Blood Glucose.: 89 mg/dL (26 Nov 2023 17:20)    LIVER FUNCTIONS - ( 27 Nov 2023 04:55 )  Alb: 2.1 g/dL / Pro: 7.5 g/dL / ALK PHOS: 178 U/L / ALT: 79 U/L / AST: 117 U/L / GGT: x             RADIOLOGY & ADDITIONAL STUDIES:

## 2023-11-27 NOTE — PROGRESS NOTE ADULT - ASSESSMENT
78M w PMH Crohn's, AFib/Flutter s/p DCCVs on amiodarone, remote ileocectomy and open appendectomy. Admitted (6/23) for SBO vs Crohns flare, s/p NGT decompression and s/p lap converted to open TRE, SBR x 3, left in discontinuity with abthera vac on (6/27), RTOR for ileocolic resection, small bowel anastomosis, and abdominal wall closure on (6/28), c/b fluid collection s/p IR aspiration of perihepatic fluid on (7/3), c/b wound dehiscence s/p RTOR exlap, washout, ileocolic resection with end ileostomy, blow hole colostomy, red rubber from ileostomy to small bowel anastomosis; vicryl bridging mesh on (7/5) transferred to SICU postoperatively for hemodynamic monitoring, with hospital course complicated by periods of severe ELVI and hyponatremia, which resolved but stepped back up for to SICU on (9/10) for acute AMS, intermittent hypoglycemia, AFib with RVR. Percutaneous Cholecystostomy placed on (9/11) for enlarged GB, PCT capped 10/5 and uncapped 10/25 for hyperbilirubinemia, Right brachial DVT, left basillic and cephalic superficial thrombus on duplex 11/2, CT 11/14 with ALDEN ground glass opacity of unclear etiology, completed empiric 7day cefepime course 11/22, rising T-bili of unclear etilogy, now sepsis w/ fungemia (mehran glabrata), likely CLABSI.       NEURO: initial hospital course with AMS (resolved), EEG neg. Hx of depression - cont Effexor. Insomnia: Melatonin PRN. Nausea: Zofran PRN. Anxiety: Lorazepam PRN.   HEENT: Cepacol prn  CV: Hx Afib/flutter: s/p DCCV, Amio stopped due to persistent transaminitis. Continue Metoprolol 5q6 with hold parameters. Hx HLD: Lipitor held given high LFTs. TTE  (7/18) - PASP 64mmHg, EF 65-70%,  Hx HTN -  BP ok without Norvasc and Losartan.   PULM: CT from 11/14 with ALDEN ground glass opacity of unclear etiology. COVID negative. RVP negative. completed 7d empiric cefepime 11/22 to cover for potential PNA.   GI/FEN: High protein diet. Cont Ensure max x1/day. 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 high output EC fistula: cont Octreotide, Imodium, Lomotil, Tincture of Opium, Cholestyramine 10/18. Transaminitis elevated T bili of unclear origin,  s/p Perc Deb on 9/11; MRCP 10/30 no obstruction, seen by Hepatology started on ursodiol, RUQ US 11/25 CBD only 5mm, hepatic vessels patent. consider repeat MRCP, cont PPI. monitor drainage from fistula, bolus as needed to keep I&O even. Ensure once daily with LPS. getting D10NS@100 for now while holding TPN  : Voids. ELVI, Cr peaked at 2.39. trended down. still making urine. follow closely.   ENDO: Hx hypothyroid: IV Synthroid, TSH wnl on 10/23  ID: chest CT with ALDEN with ground glass opacities, unclear etiology. completed empiric 7days cefepime (11/15-11/22), bld cx from 11/22 grew candida glabrata. likely CLABSI. PICC removed 11/24. ID consulted. added caspofungin (11/24--) . repeat surveillance blood cx. ophthalmology evaulated - normal ocular exam. echo no vegetation, off cefepime/flagyl. recent MRSA swab negative, RVP panel negative, COVID negative this past week. Cont Nystatin powder // PREVIOUSLY had C. tertium, Lactobacillis from his IR cx 7/3, and candida albicans, lactobacillus, vanc sensitive E faecium, vanc resistant E gallinarum, vanc resistant E casseliflavus, lactobacillus from his OR cx 7/5. Completed course of abx with Imipenem (9/10-9/15). Imipenem (8/26--) imipenem (6/30-7/12, 7/23-7/30), Dapto (6/30-7/5 and 7/23-7/24). Empiric dapto (8/23-24) and cefepime (8/23-24).   PPX: SCDs, was on Therapeutic lovenox bid  for R brachial vein DVT, will switch to hep gtts in setting of ELVI. (will wait until ptt normalize)    HEME: Anemia: continue Epogen weekly. S/p Iron Sucrose 200 qd x 3 days for chronic anemia.  LINES: PIVs, ///DC'd: ZAHEER PICC (9/1-11/1 ), HOLLIS PICC: (11/1-11/24)   WOUNDS/DRAINS: Abdominal wound and fistula with wound manager in place dressing change Tuesday and Friday. RLQ Ostomy. IR perc deb. // DC: L Clay drain.  PT: Ambulate as tolerated   DISPO: SICU due to complicated hospital course.

## 2023-11-27 NOTE — PROGRESS NOTE ADULT - SUBJECTIVE AND OBJECTIVE BOX
ON: Dustin pouch leaking- reinforced, Ostomy also replaced. FS 96@1am - no changes. AM cr 2.0. Labs repeated as taken from prox site of D5W. Net neg approx -1L (intake not accurate), 1L LR bolus x1. BCx NG @ 12 hrs.   11/26: hypoglycemic to FS 50's given D50 and changed IVF to D10NS. given 1 liter bolus x 2 for high fistula output. ptt still 54.5 , surveillance bld cx sent      SUBJECTIVE: Patient seen and examined bedside by Surgical resident. Resting comfortably in bed this morning, states that he has been doing well with PO intake of foods.     caspofungin IVPB 50 milliGRAM(s) IV Intermittent every 24 hours  metoprolol tartrate Injectable 5 milliGRAM(s) IV Push every 6 hours    MEDICATIONS  (PRN):  LORazepam   Injectable 0.5 milliGRAM(s) IV Push at bedtime PRN Anxiety  ondansetron Injectable 4 milliGRAM(s) IV Push every 8 hours PRN Nausea and/or Vomiting  simethicone 80 milliGRAM(s) Chew daily PRN Gas      I&O's Detail    26 Nov 2023 07:01  -  27 Nov 2023 07:00  --------------------------------------------------------  IN:    dextrose 10% + sodium chloride 0.9%: 1700 mL    IV PiggyBack: 250 mL    Lactated Ringers Bolus: 1000 mL    sodium chloride 0.9%: 1400 mL    Sodium Chloride 0.9% Bolus: 1000 mL  Total IN: 5350 mL    OUT:    Drain (mL): 530 mL    Drain (mL): 2175 mL    Drain (mL): 50 mL    Voided (mL): 2865 mL  Total OUT: 5620 mL    Total NET: -270 mL      27 Nov 2023 07:01  -  27 Nov 2023 08:51  --------------------------------------------------------  IN:    dextrose 10% + sodium chloride 0.9%: 100 mL  Total IN: 100 mL    OUT:    Voided (mL): 200 mL  Total OUT: 200 mL    Total NET: -100 mL          Vital Signs Last 24 Hrs  T(C): 37.1 (27 Nov 2023 06:04), Max: 37.1 (27 Nov 2023 06:04)  T(F): 98.8 (27 Nov 2023 06:04), Max: 98.8 (27 Nov 2023 06:04)  HR: 92 (27 Nov 2023 06:00) (90 - 94)  BP: 146/69 (27 Nov 2023 06:00) (112/55 - 161/80)  BP(mean): 99 (27 Nov 2023 06:00) (78 - 117)  RR: 17 (27 Nov 2023 06:00) (15 - 23)  SpO2: 97% (27 Nov 2023 06:00) (91% - 100%)    Parameters below as of 27 Nov 2023 07:00  Patient On (Oxygen Delivery Method): room air          Physical Exam:  General: NAD, resting comfortably in bed  Pulmonary: Nonlabored breathing, no respiratory distress  Cardiovascular: NSR  Abdominal: soft, NT/ND; right perc cony with bilious drainage. fistula with wound manager with liquid and some formed content. wound bed pink, no bleeding. right sided ostomy with red rubber intact.   Extremities: WWP  Neuro: A/O x 3, CNs II-XII grossly intact  Pulses: palpable distal pulses    LABS:                        9.1    7.67  )-----------( 145      ( 27 Nov 2023 05:36 )             28.4     11-27    134<L>  |  107  |  48<H>  ----------------------------<  103<H>  4.4   |  17<L>  |  2.00<H>    Ca    8.8      27 Nov 2023 05:36  Phos  3.4     11-27  Mg     1.7     11-27    TPro  7.5  /  Alb  2.1<L>  /  TBili  7.9<H>  /  DBili  6.0<H>  /  AST  117<H>  /  ALT  79<H>  /  AlkPhos  178<H>  11-27    PT/INR - ( 27 Nov 2023 04:55 )   PT: 14.4 sec;   INR: 1.27          PTT - ( 27 Nov 2023 04:55 )  PTT:42.6 sec  Urinalysis Basic - ( 27 Nov 2023 05:36 )    Color: x / Appearance: x / SG: x / pH: x  Gluc: 103 mg/dL / Ketone: x  / Bili: x / Urobili: x   Blood: x / Protein: x / Nitrite: x   Leuk Esterase: x / RBC: x / WBC x   Sq Epi: x / Non Sq Epi: x / Bacteria: x        RADIOLOGY & ADDITIONAL STUDIES:      Culture - Blood (collected 11-26-23 @ 17:37)  Source: .Blood Blood  Preliminary Report (11-27-23 @ 06:00):    No growth at 12 hours    Culture - Blood (collected 11-26-23 @ 17:37)  Source: .Blood Blood  Preliminary Report (11-27-23 @ 06:00):    No growth at 12 hours

## 2023-11-27 NOTE — PROGRESS NOTE ADULT - ASSESSMENT
78M w PMH Crohn's, AFib/Flutter s/p DCCVs on amiodarone, remote ileocectomy and open appendectomy. Admitted (6/23) for SBO vs Crohns flare, s/p NGT decompression and s/p lap converted to open TRE, SBR x 3, left in discontinuity with abthera vac on (6/27), RTOR for ileocolic resection, small bowel anastomosis, and abdominal wall closure on (6/28), c/b fluid collection s/p IR aspiration of perihepatic fluid on (7/3), c/b wound dehiscence s/p RTOR exlap, washout, ileocolic resection with end ileostomy, blow hole colostomy, red rubber from ileostomy to small bowel anastomosis; vicryl bridging mesh on (7/5) transferred to SICU postoperatively for hemodynamic monitoring, with hospital course complicated by periods of severe ELVI and hyponatremia, which resolved but stepped back up for to SICU on (9/10) for acute AMS, intermittent hypoglycemia, AFib with RVR. Percutaneous Cholecystostomy placed on (9/11) for enlarged GB, PCT capped 10/5 and uncapped 10/25 for hyperbilirubinemia, Right brachial DVT, left basillic and cephalic superficial thrombus on duplex 11/2, CT 11/14 with ALDEN ground glass opacity of unclear etiology, completed empiric 7day cefepime course 11/22, rising T-bili of unclear etilogy, now sepsis w/ fungemia (mehran glabrata), likely CLABSI.         Continue with ID recs  Rest of care per SICU   no feeds through fistula, keep feeding PO.

## 2023-11-27 NOTE — PROGRESS NOTE ADULT - ASSESSMENT
78M w PMH Crohn's, AFib/Flutter s/p DCCVs on amiodarone, remote ileocectomy and open appendectomy. Admitted (6/23) for SBO vs Crohns flare, s/p NGT decompression and s/p lap converted to open TRE, SBR x 3, left in discontinuity with abthera vac on (6/27), RTOR for ileocolic resection, small bowel anastomosis, and abdominal wall closure on (6/28), c/b fluid collection s/p IR aspiration of perihepatic fluid on (7/3), c/b wound dehiscence s/p RTOR exlap, washout, ileocolic resection with end ileostomy, blow hole colostomy, red rubber from ileostomy to small bowel anastomosis; vicryl bridging mesh on (7/5) transferred to SICU postoperatively for hemodynamic monitoring, with hospital course complicated by periods of severe ELVI and hyponatremia, which resolved but stepped back up for to SICU on (9/10) for acute AMS, intermittent hypoglycemia, AFib with RVR. Percutaneous Cholecystostomy placed on (9/11) for enlarged GB, PCT capped 10/5 and uncapped 10/25 for hyperbilirubinemia, Right brachial DVT, left basillic and cephalic superficial thrombus on duplex 11/2, CT 11/14 with ALDEN ground glass opacity of unclear etiology, completed empiric 7day cefepime course 11/22, rising T-bili of unclear etilogy, now sepsis w/ fungemia (mehran glabrata), likely CLABSI.       Plan:   - Encourage IS/OOB   - Continue Caspo per ID   - Continue PO diet and consider switching D10-NS  to D5-NS due to hyperglycemia   - Trend Cr   - Appreciate SICU care

## 2023-11-27 NOTE — CHART NOTE - NSCHARTNOTEFT_GEN_A_CORE
Admitting Diagnosis:   Patient is a 78y old  Male who presents with a chief complaint of Crohns (2023 07:02)      PAST MEDICAL & SURGICAL HISTORY:  Essential hypertension  Hypertension      Atrial fibrillation  s/p cardioversion  and   Pt. reports 4 DCCV  Now on Amiodarone 200mg PO bid and Eliquis 5mg PO bid  Last DCCV 4 yrs ago at [facility]      Crohn's disease  s/p partial resection of ileum      Hyperlipidemia      Hypothyroidism      History of depression  On Venlafaxine ER 150mg PO bid      Junctional rhythm      H/O knee surgery      History of cataract surgery          Current Nutrition Order:  TPN held, on IV D10 + NS [provides 240g dextrose / 816 kcals]  PO- Low fat PO diet + 1 LPS BID [provides 200 kcals, 30g protein] + 1 Ensure Max daily [150 kcals, 20g protein]    PO Intake: Good (%) [   ]  Fair (50-75%) [ X  ] Poor (<25%) [   ]    GI Issues:   : ileostomy output 50 cc x 24hrs, abdominal wound/fistula output 2175 cc x 24hrs, per cony output 530 cc x 24hrs     : ileostomy output 25cc x 24hrs, abdominal wound/fistula output 2350 cc x 24hrs, per cony output 775cc x 24hrs     : ileostomy output 55cc x 24hrs, abdominal wound/fistula output 2025 cc x 24hrs, per cony output 450cc x 24hrs     11/15:  ileostomy output 65cc x 24hrs, abdominal wound/fistula output 875cc x 24hrs, per cony output 775cc x 24hrs    Pain: no pain/discomfort noted    Skin Integrity: abd wound [6 x 4cm] and fistula with wound manager in place, RLQ ileostomy, perc cony drain, skin tear below ostomy, wound to lower back. wound to R heel, blisters to sacrum. Rudy score 16    Labs:       134<L>  |  107  |  48<H>  ----------------------------<  103<H>  4.4   |  17<L>  |  2.00<H>    Ca    8.8      2023 05:36  Phos  3.4       Mg     1.7         TPro  7.5  /  Alb  2.1<L>  /  TBili  7.9<H>  /  DBili  6.0<H>  /  AST  117<H>  /  ALT  79<H>  /  AlkPhos  178<H>      CAPILLARY BLOOD GLUCOSE      POCT Blood Glucose.: 96 mg/dL (2023 01:11)  POCT Blood Glucose.: 89 mg/dL (2023 17:20)  POCT Blood Glucose.: 98 mg/dL (2023 12:23)  POCT Blood Glucose.: 128 mg/dL (2023 11:09)      Medications:  MEDICATIONS  (STANDING):  caspofungin IVPB 50 milliGRAM(s) IV Intermittent every 24 hours  chlorhexidine 2% Cloths 1 Application(s) Topical <User Schedule>  cholestyramine Powder (Sugar-Free) 4 Gram(s) Oral daily  dextrose 10% + sodium chloride 0.9%. 1000 milliLiter(s) (100 mL/Hr) IV Continuous <Continuous>  diphenoxylate/atropine 2 Tablet(s) Oral every 6 hours  epoetin matt-epbx (RETACRIT) Injectable 99441 Unit(s) SubCutaneous every 7 days  glucagon  Injectable 1 milliGRAM(s) IntraMuscular once  influenza  Vaccine (HIGH DOSE) 0.7 milliLiter(s) IntraMuscular once  levothyroxine Injectable 40 MICROGram(s) IV Push at bedtime  loperamide 4 milliGRAM(s) Oral every 6 hours  metoprolol tartrate Injectable 5 milliGRAM(s) IV Push every 6 hours  opium Tincture 6 milliGRAM(s) Oral every 6 hours  pantoprazole  Injectable 40 milliGRAM(s) IV Push daily  ursodiol Suspension 300 milliGRAM(s) Oral every 8 hours  venlafaxine XR. 150 milliGRAM(s) Oral two times a day    MEDICATIONS  (PRN):  LORazepam   Injectable 0.5 milliGRAM(s) IV Push at bedtime PRN Anxiety  ondansetron Injectable 4 milliGRAM(s) IV Push every 8 hours PRN Nausea and/or Vomiting  simethicone 80 milliGRAM(s) Chew daily PRN Gas      Daily Weight in k.4 (2023 00:06)  Daily 94.2kg [2023]  Daily 94.2kg [2023]  Daily 94.2kg [2023]  Daily 94.2 [15 Nov 2023]  Daily 94.2kg [2023]  Daily 95.8kg [2023]  Daily 94.2kg [2023]  Daily 85.2kg [2023]  Daily 85.2kg [31 Oct 2023]  Daily 85.2kg [24 Oct 2023]  Daily 85.2kg [20 Oct 2023]  Daily 81.9kg [18 Oct 2023]  Daily 85.2kg [16 Oct 2023]  Daily   95.2kg [12 Oct 2023]   Daily 87.7kg [11 Oct 2023]  Daily 87.4kg [09 Oct 2023]  Daily 86.4kg [07 Oct 2023]  Daily 82.5kg [06 Oct 2023]  Daily 82.6kg [4 Oct 2023]   Daily 83.5kg [03 Oct 2023]  Daily 84.5kg [2 Oct 2023]  Daily 87.2kg [1 Oct 2023]  Daily 88.3kg [29 Sep 2023]  Daily 89.9kg [28 Sep 2023]  Daily 87.7kg [24 Sep 2023]  Daily 90.4kg [21 Sep 2023]  Daily 91.4kg [20 Sep 2023]  Daily 86.7kg [18 Sep 2023]  Daily 88.1kg [16 Sep 2023]  Daily 88.9kg [15 Sep 2023]   Daily 89.1 [14 Sep 2023]  Daily 92.9kg [6 Sep 2023]  Daily 91.8kg [27 Aug 2023]  Daily 101kg [9 Aug 2023]       Weight Change: weight trending down throughout admission, now appears to be trending back up. Recommend continue weekly/daily weights for trending & ensuring adequacy of nutrition provision    IBW: 86.4kg  % IBW: 109.3%    Estimated energy needs:   Ideal body weight (86.4kg) used for calculations as pt >100% IBW and BMI <30 per [facility] Standards of Care. Needs estimated for age and adjusted for current clinical status, increased needs for post-op & open abd wound healing    Calories: 6382-4320 kcals based on 30-40 kcals/kg  Protein: 129.6-172.8 g protein based on 1.5-2.0g protein/kg + additional depending on outputs  *Fluid needs per team    Subjective:   78 M w/ Crohn's, AFib/Flutter s/p DCCVs on amiodarone, remote ileocectomy and open appendectomy. Admitted () for SBO vs Crohns flare, s/p NGT decompression and s/p lap TRE converted to open TRE, SBR x 3, left in discontinuity with abthera vac on (), RTOR for ileocolic resection, small bowel anastomosis, and abdominal wall closure on (), c/b fluid collection s/p IR aspiration of perihepatic fluid on (7/3), c/b wound dehiscence s/p RTOR exlap, washout, ileocolic resection with end ileostomy, blow hole colostomy, red rubber from ileostomy to small bowel anastomosis; vicryl bridging mesh on () transferred to SICU postoperatively for hemodynamic monitoring, with hospital course complicated by periods of AMS most recently on  and associated with oliguria, severe ELVI and hyponatremia, which resolved but stepped back up for to SICU on 9/10 for acute AMS, intermittent hypoglycemia, AFib with RVR. Percutaneous Cholecystostomy placed on  for enlarged GB, PCT capped 10/5.    Chart reviewed. Pt seen on  with IDT today during AM rounds. TPN stopped  due to fungemia, PICC removed. Pt remains on PO Low fat diet + 1 LPS BID [provides 200 kcals, 30g protein] + 1 Ensure Max daily [150 kcals, 20g protein]. Also receiving IV D10 + NS [provides 240g dextrose / 816 kcals]. Noted with no improvement in liver enzymes [increase in direct bilirubin & total bili]. Ongoing large fistula output, 2175 cc x 24hrs. Noted wound continues to heal. Diet education reinforced to patient. Labs: selenium 79 <L> [], copper 26 <L> []. total bilirubin 7.9 <H>, direct bilirubin 6.0 <H>, indirect bili 1.8 <H> [continue close monitoring & adjustment prn]. Meds: on lomotil, opium tincture, protonix, mylicon, ursodiol, cholestyramine, synthroid [administer 30-60 minutes before food; separate at least 4hrs from calcium or iron-containing products or bile acid sequestrants], imodium, zofran. RDN will continue to monitor, reassess, and intervene as appropriate.     Previous Nutrition Diagnosis:  Increased Nutrient Needs R/T physiological demands for nutrient AEB post-op wound healing    Active [ X ]  Resolved [   ]    If resolved, new PES:     Goal:  Pt will meet at least 75% of protein & energy needs via most appropriate route for nutrition     Recommendations:  1. Resume TPN as soon as medically feasible- recommend 361g Dex, 144g AA daily over 18hrs; 50g SMOF lipids ***lipids only 3x/week- MWF schedule***  - can change back to SMOF lipids  - to provide [average] 2017.7 kcals daily, 144g protein, GIR 2.66 mg/kg/min [23.4 kcals/kg, 16.7 non-protein kcals/kg, 1.67g protein/kg IBW]  - consider increasing copper and zinc [low per latest labs]  - recommend cycling TPN over 12-16hrs as feasible   monitor weights & wound healing, adjust substrates as indicated  2. Recommend checking vit D levels  3. PO diet per team discretion  - current: Low fat PO diet + 1 LPS BID [200 kcals, 30g protein] + 1 Ensure Max daily [150 kcals, 30g protein]  - consider NPO for bowel rest as indicated   - ongoing diet education prn  4. Hydration per team  - risk for dehydration with high outputs, monitor  - additional fluids prn  5. Fluids & lytes per team  - consider oral rehydration solution prn  6. Weekly lipid panel   - hold/decrease lipids if TG >400  7. Monitor BMP/Mg/Phos, POC BG  - monitor & replenish lytes outside TPN bag prn  8. Continue close monitoring of clinical course & adjust recommendations prn    Education: current diet order, high protein foods, soluble fiber    Risk Level: High [  X ] Moderate [   ] Low [   ]

## 2023-11-28 NOTE — PROGRESS NOTE ADULT - SUBJECTIVE AND OBJECTIVE BOX
SUBJECTIVE:   Patient seen and examined at bedside this AM   No acute overnight events    No acute distress this AM. Resting comfortably without any complaints. Tolerating diet. Voiding spontaneously       MEDICATIONS  (STANDING):  amLODIPine   Tablet 5 milliGRAM(s) Oral every 24 hours  caspofungin IVPB 50 milliGRAM(s) IV Intermittent every 24 hours  chlorhexidine 2% Cloths 1 Application(s) Topical <User Schedule>  cholestyramine Powder (Sugar-Free) 4 Gram(s) Oral daily  dextrose 10% + sodium chloride 0.9%. 1000 milliLiter(s) (100 mL/Hr) IV Continuous <Continuous>  diphenoxylate/atropine 2 Tablet(s) Oral every 6 hours  epoetin matt-epbx (RETACRIT) Injectable 36638 Unit(s) SubCutaneous every 7 days  ergocalciferol 53120 Unit(s) Oral <User Schedule>  folic acid Injectable 1 milliGRAM(s) IV Push daily  glucagon  Injectable 1 milliGRAM(s) IntraMuscular once  heparin   Injectable 5000 Unit(s) SubCutaneous every 8 hours  influenza  Vaccine (HIGH DOSE) 0.7 milliLiter(s) IntraMuscular once  levothyroxine Injectable 40 MICROGram(s) IV Push at bedtime  loperamide Liquid 4 milliGRAM(s) Oral every 12 hours  metoprolol tartrate Injectable 5 milliGRAM(s) IV Push every 6 hours  octreotide  Injectable 100 MICROGram(s) IV Push three times a day  opium Tincture 6 milliGRAM(s) Oral every 6 hours  pantoprazole  Injectable 40 milliGRAM(s) IV Push daily  thiamine Injectable 100 milliGRAM(s) IV Push every 24 hours  ursodiol Suspension 300 milliGRAM(s) Oral every 8 hours  venlafaxine XR. 150 milliGRAM(s) Oral two times a day    MEDICATIONS  (PRN):  LORazepam   Injectable 0.5 milliGRAM(s) IV Push at bedtime PRN Anxiety  ondansetron Injectable 4 milliGRAM(s) IV Push every 8 hours PRN Nausea and/or Vomiting  simethicone 80 milliGRAM(s) Chew daily PRN Gas      Vital Signs Last 24 Hrs  T(C): 36.2 (28 Nov 2023 17:34), Max: 36.6 (28 Nov 2023 01:02)  T(F): 97.2 (28 Nov 2023 17:34), Max: 97.8 (28 Nov 2023 01:02)  HR: 89 (28 Nov 2023 19:00) (88 - 92)  BP: 154/73 (28 Nov 2023 19:00) (128/61 - 181/79)  BP(mean): 105 (28 Nov 2023 19:00) (88 - 122)  RR: 19 (28 Nov 2023 19:00) (14 - 30)  SpO2: 93% (28 Nov 2023 19:00) (89% - 100%)    Parameters below as of 28 Nov 2023 18:00  Patient On (Oxygen Delivery Method): room air      Physical Exam:  Neurological: AAOx3, CNII-XII intact,  strength 5/5 b/l  ENT: mucus membrane moist  Cardiovascular: RRR  Respiratory: CTA  Gastrointestinal: soft, NT, ND, right perc cony with dark bilious drainage. fistula with wound manager with grayish colored liquid and some formed content. wound bed pink, no bleeding. right sided ostomy with red rubber intact.   Extremities: warm, no dependent edema  Vascular: no cyanosis/erythema  Skin: no rashes  MSK: no joint swelling.       I&O's Summary    27 Nov 2023 07:01  -  28 Nov 2023 07:00  --------------------------------------------------------  IN: 5030 mL / OUT: 4670 mL / NET: 360 mL    28 Nov 2023 07:01  -  28 Nov 2023 19:50  --------------------------------------------------------  IN: 2075 mL / OUT: 2210 mL / NET: -135 mL        LABS:                        9.1    7.67  )-----------( 145      ( 27 Nov 2023 05:36 )             28.4     11-28    131<L>  |  105  |  38<H>  ----------------------------<  126<H>  4.6   |  16<L>  |  2.14<H>    Ca    9.0      28 Nov 2023 04:08  Phos  3.4     11-28  Mg     1.8     11-28    TPro  8.0  /  Alb  2.1<L>  /  TBili  8.5<H>  /  DBili  5.8<H>  /  AST  118<H>  /  ALT  81<H>  /  AlkPhos  217<H>  11-28    PT/INR - ( 28 Nov 2023 04:08 )   PT: 13.9 sec;   INR: 1.23          PTT - ( 28 Nov 2023 04:08 )  PTT:39.6 sec  Urinalysis Basic - ( 28 Nov 2023 04:08 )    Color: x / Appearance: x / SG: x / pH: x  Gluc: 126 mg/dL / Ketone: x  / Bili: x / Urobili: x   Blood: x / Protein: x / Nitrite: x   Leuk Esterase: x / RBC: x / WBC x   Sq Epi: x / Non Sq Epi: x / Bacteria: x      CAPILLARY BLOOD GLUCOSE      POCT Blood Glucose.: 109 mg/dL (28 Nov 2023 01:03)    LIVER FUNCTIONS - ( 28 Nov 2023 04:08 )  Alb: 2.1 g/dL / Pro: 8.0 g/dL / ALK PHOS: 217 U/L / ALT: 81 U/L / AST: 118 U/L / GGT: x             RADIOLOGY & ADDITIONAL STUDIES:   SUBJECTIVE:   Patient seen and examined at bedside this AM   No acute overnight events    No acute distress this AM. Resting comfortably without any complaints. Tolerating diet. Voiding spontaneously       MEDICATIONS  (STANDING):  amLODIPine   Tablet 5 milliGRAM(s) Oral every 24 hours  caspofungin IVPB 50 milliGRAM(s) IV Intermittent every 24 hours  chlorhexidine 2% Cloths 1 Application(s) Topical <User Schedule>  cholestyramine Powder (Sugar-Free) 4 Gram(s) Oral daily  dextrose 10% + sodium chloride 0.9%. 1000 milliLiter(s) (100 mL/Hr) IV Continuous <Continuous>  diphenoxylate/atropine 2 Tablet(s) Oral every 6 hours  epoetin matt-epbx (RETACRIT) Injectable 62091 Unit(s) SubCutaneous every 7 days  ergocalciferol 58735 Unit(s) Oral <User Schedule>  folic acid Injectable 1 milliGRAM(s) IV Push daily  glucagon  Injectable 1 milliGRAM(s) IntraMuscular once  heparin   Injectable 5000 Unit(s) SubCutaneous every 8 hours  influenza  Vaccine (HIGH DOSE) 0.7 milliLiter(s) IntraMuscular once  levothyroxine Injectable 40 MICROGram(s) IV Push at bedtime  loperamide Liquid 4 milliGRAM(s) Oral every 12 hours  metoprolol tartrate Injectable 5 milliGRAM(s) IV Push every 6 hours  octreotide  Injectable 100 MICROGram(s) IV Push three times a day  opium Tincture 6 milliGRAM(s) Oral every 6 hours  pantoprazole  Injectable 40 milliGRAM(s) IV Push daily  thiamine Injectable 100 milliGRAM(s) IV Push every 24 hours  ursodiol Suspension 300 milliGRAM(s) Oral every 8 hours  venlafaxine XR. 150 milliGRAM(s) Oral two times a day    MEDICATIONS  (PRN):  LORazepam   Injectable 0.5 milliGRAM(s) IV Push at bedtime PRN Anxiety  ondansetron Injectable 4 milliGRAM(s) IV Push every 8 hours PRN Nausea and/or Vomiting  simethicone 80 milliGRAM(s) Chew daily PRN Gas      Vital Signs Last 24 Hrs  T(C): 36.2 (28 Nov 2023 17:34), Max: 36.6 (28 Nov 2023 01:02)  T(F): 97.2 (28 Nov 2023 17:34), Max: 97.8 (28 Nov 2023 01:02)  HR: 89 (28 Nov 2023 19:00) (88 - 92)  BP: 154/73 (28 Nov 2023 19:00) (128/61 - 181/79)  BP(mean): 105 (28 Nov 2023 19:00) (88 - 122)  RR: 19 (28 Nov 2023 19:00) (14 - 30)  SpO2: 93% (28 Nov 2023 19:00) (89% - 100%)    Parameters below as of 28 Nov 2023 18:00  Patient On (Oxygen Delivery Method): room air      Physical Exam:  Neurological: AAOx3, CNII-XII intact,  strength 5/5 b/l  ENT: mucus membrane moist  Cardiovascular: RRR  Respiratory: CTA  Gastrointestinal: soft, NT, ND, right perc cony with dark bilious drainage. fistula with wound manager with grayish colored liquid and some formed content. wound bed pink, no bleeding. right sided ostomy with red rubber intact.   Extremities: warm, no dependent edema  Vascular: no cyanosis/erythema  Skin: no rashes  MSK: no joint swelling.       I&O's Summary    27 Nov 2023 07:01  -  28 Nov 2023 07:00  --------------------------------------------------------  IN: 5030 mL / OUT: 4670 mL / NET: 360 mL    28 Nov 2023 07:01  -  28 Nov 2023 19:50  --------------------------------------------------------  IN: 2075 mL / OUT: 2210 mL / NET: -135 mL        LABS:                        9.1    7.67  )-----------( 145      ( 27 Nov 2023 05:36 )             28.4     11-28    131<L>  |  105  |  38<H>  ----------------------------<  126<H>  4.6   |  16<L>  |  2.14<H>    Ca    9.0      28 Nov 2023 04:08  Phos  3.4     11-28  Mg     1.8     11-28    TPro  8.0  /  Alb  2.1<L>  /  TBili  8.5<H>  /  DBili  5.8<H>  /  AST  118<H>  /  ALT  81<H>  /  AlkPhos  217<H>  11-28    PT/INR - ( 28 Nov 2023 04:08 )   PT: 13.9 sec;   INR: 1.23          PTT - ( 28 Nov 2023 04:08 )  PTT:39.6 sec  Urinalysis Basic - ( 28 Nov 2023 04:08 )    Color: x / Appearance: x / SG: x / pH: x  Gluc: 126 mg/dL / Ketone: x  / Bili: x / Urobili: x   Blood: x / Protein: x / Nitrite: x   Leuk Esterase: x / RBC: x / WBC x   Sq Epi: x / Non Sq Epi: x / Bacteria: x      CAPILLARY BLOOD GLUCOSE      POCT Blood Glucose.: 109 mg/dL (28 Nov 2023 01:03)    LIVER FUNCTIONS - ( 28 Nov 2023 04:08 )  Alb: 2.1 g/dL / Pro: 8.0 g/dL / ALK PHOS: 217 U/L / ALT: 81 U/L / AST: 118 U/L / GGT: x             RADIOLOGY & ADDITIONAL STUDIES:   SUBJECTIVE:   Patient seen and examined at bedside this AM   No acute overnight events    No acute distress this AM. Resting comfortably without any complaints. Tolerating diet. Voiding spontaneously       MEDICATIONS  (STANDING):  amLODIPine   Tablet 5 milliGRAM(s) Oral every 24 hours  caspofungin IVPB 50 milliGRAM(s) IV Intermittent every 24 hours  chlorhexidine 2% Cloths 1 Application(s) Topical <User Schedule>  cholestyramine Powder (Sugar-Free) 4 Gram(s) Oral daily  dextrose 10% + sodium chloride 0.9%. 1000 milliLiter(s) (100 mL/Hr) IV Continuous <Continuous>  diphenoxylate/atropine 2 Tablet(s) Oral every 6 hours  epoetin matt-epbx (RETACRIT) Injectable 01003 Unit(s) SubCutaneous every 7 days  ergocalciferol 96699 Unit(s) Oral <User Schedule>  folic acid Injectable 1 milliGRAM(s) IV Push daily  glucagon  Injectable 1 milliGRAM(s) IntraMuscular once  heparin   Injectable 5000 Unit(s) SubCutaneous every 8 hours  influenza  Vaccine (HIGH DOSE) 0.7 milliLiter(s) IntraMuscular once  levothyroxine Injectable 40 MICROGram(s) IV Push at bedtime  loperamide Liquid 4 milliGRAM(s) Oral every 12 hours  metoprolol tartrate Injectable 5 milliGRAM(s) IV Push every 6 hours  octreotide  Injectable 100 MICROGram(s) IV Push three times a day  opium Tincture 6 milliGRAM(s) Oral every 6 hours  pantoprazole  Injectable 40 milliGRAM(s) IV Push daily  thiamine Injectable 100 milliGRAM(s) IV Push every 24 hours  ursodiol Suspension 300 milliGRAM(s) Oral every 8 hours  venlafaxine XR. 150 milliGRAM(s) Oral two times a day    MEDICATIONS  (PRN):  LORazepam   Injectable 0.5 milliGRAM(s) IV Push at bedtime PRN Anxiety  ondansetron Injectable 4 milliGRAM(s) IV Push every 8 hours PRN Nausea and/or Vomiting  simethicone 80 milliGRAM(s) Chew daily PRN Gas      Vital Signs Last 24 Hrs  T(C): 36.2 (28 Nov 2023 17:34), Max: 36.6 (28 Nov 2023 01:02)  T(F): 97.2 (28 Nov 2023 17:34), Max: 97.8 (28 Nov 2023 01:02)  HR: 89 (28 Nov 2023 19:00) (88 - 92)  BP: 154/73 (28 Nov 2023 19:00) (128/61 - 181/79)  BP(mean): 105 (28 Nov 2023 19:00) (88 - 122)  RR: 19 (28 Nov 2023 19:00) (14 - 30)  SpO2: 93% (28 Nov 2023 19:00) (89% - 100%)    Parameters below as of 28 Nov 2023 18:00  Patient On (Oxygen Delivery Method): room air      Physical Exam:  Neurological: AAOx3, CNII-XII intact,  strength 5/5 b/l  ENT: mucus membrane moist  Cardiovascular: RRR  Respiratory: CTA  Gastrointestinal: soft, NT, ND, right perc cony with dark bilious drainage. fistula with wound manager with grayish colored liquid and some formed content. wound bed pink, no bleeding. right sided ostomy with red rubber intact.   Extremities: warm, no dependent edema  Vascular: no cyanosis/erythema  Skin: no rashes  MSK: no joint swelling.       I&O's Summary    27 Nov 2023 07:01  -  28 Nov 2023 07:00  --------------------------------------------------------  IN: 5030 mL / OUT: 4670 mL / NET: 360 mL    28 Nov 2023 07:01  -  28 Nov 2023 19:50  --------------------------------------------------------  IN: 2075 mL / OUT: 2210 mL / NET: -135 mL        LABS:                        9.1    7.67  )-----------( 145      ( 27 Nov 2023 05:36 )             28.4     11-28    131<L>  |  105  |  38<H>  ----------------------------<  126<H>  4.6   |  16<L>  |  2.14<H>    Ca    9.0      28 Nov 2023 04:08  Phos  3.4     11-28  Mg     1.8     11-28    TPro  8.0  /  Alb  2.1<L>  /  TBili  8.5<H>  /  DBili  5.8<H>  /  AST  118<H>  /  ALT  81<H>  /  AlkPhos  217<H>  11-28    PT/INR - ( 28 Nov 2023 04:08 )   PT: 13.9 sec;   INR: 1.23          PTT - ( 28 Nov 2023 04:08 )  PTT:39.6 sec  Urinalysis Basic - ( 28 Nov 2023 04:08 )    Color: x / Appearance: x / SG: x / pH: x  Gluc: 126 mg/dL / Ketone: x  / Bili: x / Urobili: x   Blood: x / Protein: x / Nitrite: x   Leuk Esterase: x / RBC: x / WBC x   Sq Epi: x / Non Sq Epi: x / Bacteria: x      CAPILLARY BLOOD GLUCOSE      POCT Blood Glucose.: 109 mg/dL (28 Nov 2023 01:03)    LIVER FUNCTIONS - ( 28 Nov 2023 04:08 )  Alb: 2.1 g/dL / Pro: 8.0 g/dL / ALK PHOS: 217 U/L / ALT: 81 U/L / AST: 118 U/L / GGT: x             RADIOLOGY & ADDITIONAL STUDIES:

## 2023-11-28 NOTE — ADVANCED PRACTICE NURSE CONSULT - ASSESSMENT
New wound/fistula manager applied over fistula site. Area measures approx 6 x 4 cm, pink granulation tissue with scar tissue at periwound. Majority of effluent in pouch is thin beige liquid but with some thicker pieces. Periwound protected with stoma rings and stoma paste then fistula manager applied around fistula site. Ileostomy retracted, appliance changed and red rubber catheter placed into stoma. Some small light brown pieces noted in pouch but majority is thin beige liquid. New 1 3/4" Jayleen 2 piece appliance placed over ileostomy.

## 2023-11-28 NOTE — PROGRESS NOTE ADULT - SUBJECTIVE AND OBJECTIVE BOX
INTERVAL/OVERNIGHT EVENTS: Blood Cx neg from Nov 25, 26, 27.     SUBJECTIVE: This AM, reports doing well without complaints. No N/V or abd pain. Voids without issues.     Neurologic Medications  LORazepam   Injectable 0.5 milliGRAM(s) IV Push at bedtime PRN Anxiety  ondansetron Injectable 4 milliGRAM(s) IV Push every 8 hours PRN Nausea and/or Vomiting  opium Tincture 6 milliGRAM(s) Oral every 6 hours  venlafaxine XR. 150 milliGRAM(s) Oral two times a day    Cardiovascular Medications  metoprolol tartrate Injectable 5 milliGRAM(s) IV Push every 6 hours    Gastrointestinal Medications  dextrose 10% + sodium chloride 0.9%. 1000 milliLiter(s) IV Continuous <Continuous>  diphenoxylate/atropine 2 Tablet(s) Oral every 6 hours  folic acid Injectable 1 milliGRAM(s) IV Push daily  loperamide 4 milliGRAM(s) Oral every 6 hours  pantoprazole  Injectable 40 milliGRAM(s) IV Push daily  simethicone 80 milliGRAM(s) Chew daily PRN Gas  thiamine Injectable 100 milliGRAM(s) IV Push every 24 hours  ursodiol Suspension 300 milliGRAM(s) Oral every 8 hours    Hematologic/Oncologic Medications  epoetin matt-epbx (RETACRIT) Injectable 61119 Unit(s) SubCutaneous every 7 days    Antimicrobial/Immunologic Medications  caspofungin IVPB 50 milliGRAM(s) IV Intermittent every 24 hours    Endocrine/Metabolic Medications  cholestyramine Powder (Sugar-Free) 4 Gram(s) Oral daily  glucagon  Injectable 1 milliGRAM(s) IntraMuscular once  levothyroxine Injectable 40 MICROGram(s) IV Push at bedtime  octreotide  Injectable 100 MICROGram(s) IV Push three times a day    Topical/Other Medications  chlorhexidine 2% Cloths 1 Application(s) Topical <User Schedule>    MEDICATIONS  (PRN):  LORazepam   Injectable 0.5 milliGRAM(s) IV Push at bedtime PRN Anxiety  ondansetron Injectable 4 milliGRAM(s) IV Push every 8 hours PRN Nausea and/or Vomiting  simethicone 80 milliGRAM(s) Chew daily PRN Gas    I&O's Detail    27 Nov 2023 07:01  -  28 Nov 2023 07:00  --------------------------------------------------------  IN:    dextrose 10% + sodium chloride 0.9%: 2300 mL    IV PiggyBack: 50 mL    IV PiggyBack: 250 mL    Oral Fluid: 1430 mL    Sodium Chloride 0.9% Bolus: 1000 mL  Total IN: 5030 mL    OUT:    Drain (mL): 75 mL    Drain (mL): 655 mL    Drain (mL): 1525 mL    Voided (mL): 2415 mL  Total OUT: 4670 mL    Total NET: 360 mL    28 Nov 2023 07:01  -  28 Nov 2023 09:24  --------------------------------------------------------  IN:    dextrose 10% + sodium chloride 0.9%: 100 mL    Oral Fluid: 100 mL  Total IN: 200 mL    OUT:    Drain (mL): 25 mL    Drain (mL): 75 mL    Drain (mL): 100 mL    Voided (mL): 240 mL  Total OUT: 440 mL    Total NET: -240 mL    Vital Signs Last 24 Hrs  T(C): 36.3 (28 Nov 2023 08:30), Max: 36.8 (27 Nov 2023 09:32)  T(F): 97.4 (28 Nov 2023 08:30), Max: 98.2 (27 Nov 2023 09:32)  HR: 91 (28 Nov 2023 09:00) (88 - 94)  BP: 159/70 (28 Nov 2023 09:00) (124/60 - 181/79)  BP(mean): 101 (28 Nov 2023 09:00) (87 - 122)  RR: 30 (28 Nov 2023 09:00) (14 - 30)  SpO2: 92% (28 Nov 2023 09:00) (89% - 100%)    Parameters below as of 28 Nov 2023 08:00  Patient On (Oxygen Delivery Method): room air    Neurological: AAOx3, CNII-XII intact,  strength 5/5 b/l  ENT: mucus membrane moist  Cardiovascular: RRR  Respiratory: CTA  Gastrointestinal: soft, NT, ND, right perc cony with dark bilious drainage. fistula with wound manager with grayish colored liquid and some formed content. wound bed pink, no bleeding. right sided ostomy with red rubber intact.   Extremities: warm, no dependent edema  Vascular: no cyanosis/erythema  Skin: no rashes  MSK: no joint swelling.     LABS:                        9.1    7.67  )-----------( 145      ( 27 Nov 2023 05:36 )             28.4     11-28    131<L>  |  105  |  38<H>  ----------------------------<  126<H>  4.6   |  16<L>  |  2.14<H>    Ca    9.0      28 Nov 2023 04:08  Phos  3.4     11-28  Mg     1.8     11-28    TPro  8.0  /  Alb  2.1<L>  /  TBili  8.5<H>  /  DBili  5.8<H>  /  AST  118<H>  /  ALT  81<H>  /  AlkPhos  217<H>  11-28    PT/INR - ( 28 Nov 2023 04:08 )   PT: 13.9 sec;   INR: 1.23       PTT - ( 28 Nov 2023 04:08 )  PTT:39.6 sec  Urinalysis Basic - ( 28 Nov 2023 04:08 )    Color: x / Appearance: x / SG: x / pH: x  Gluc: 126 mg/dL / Ketone: x  / Bili: x / Urobili: x   Blood: x / Protein: x / Nitrite: x   Leuk Esterase: x / RBC: x / WBC x   Sq Epi: x / Non Sq Epi: x / Bacteria: x    RADIOLOGY & ADDITIONAL STUDIES:    Culture - Blood (collected 11-27-23 @ 11:30)  Source: .Blood Blood-Peripheral  Preliminary Report (11-28-23 @ 01:00):    No growth at 12 hours    Culture - Blood (collected 11-27-23 @ 11:30)  Source: .Blood Blood-Peripheral  Preliminary Report (11-28-23 @ 01:00):    No growth at 12 hours    Culture - Blood (collected 11-26-23 @ 17:37)  Source: .Blood Blood  Preliminary Report (11-27-23 @ 18:00):    No growth at 1 day.    Culture - Blood (collected 11-26-23 @ 17:37)  Source: .Blood Blood  Preliminary Report (11-27-23 @ 18:00):    No growth at 1 day.    Culture - Blood (collected 11-25-23 @ 08:49)  Source: .Blood Blood-Peripheral  Preliminary Report (11-27-23 @ 10:01):    No growth at 2 days.    Culture - Fungal, Blood (collected 11-24-23 @ 10:32)  Source: .Blood Blood  Preliminary Report (11-25-23 @ 10:08):    Testing in progress    Culture - Blood (collected 11-24-23 @ 10:20)  Source: .Blood Blood-Peripheral  Gram Stain (11-27-23 @ 06:22):    Anaerobic Bottle: Yeast    Floor previously notified.    Aerobic Bottle: Yeast    Floor previously notified.  Preliminary Report (11-27-23 @ 14:00):    Growth in anaerobic bottle: Candida glabrata    Aerobic Bottle: Yeast    Culture in progress    Culture - Body Fluid with Gram Stain (collected 11-23-23 @ 22:06)  Source: Bile Bile  Gram Stain (11-24-23 @ 03:35):    Moderate Gram positive cocci in pairs    Few WBC's  Preliminary Report (11-27-23 @ 14:46):    Moderate Delftia acidovorans    Moderate Pseudomonas aeruginosa    Moderate Stenotrophomonas maltophilia    Few Proteus mirabilis    Moderate Enterococcus raffinosus    Moderate Enterococcus faecalis    Few Klebsiella pneumoniae    Moderate Bacteroides fragilis  Organism: Pseudomonas aeruginosa  Enterococcus faecalis (11-28-23 @ 08:43)  Organism: Pseudomonas aeruginosa (11-28-23 @ 08:43)      Method Type:   Organism: Enterococcus faecalis (11-27-23 @ 07:57)      Method Type: DOMENICO

## 2023-11-28 NOTE — PROGRESS NOTE ADULT - ASSESSMENT
Cont to match fluids and follow creatinine and lytes closely  OOB and ambulate today  Etiology of persistent liver abnormalities unclear!

## 2023-11-28 NOTE — PROGRESS NOTE ADULT - SUBJECTIVE AND OBJECTIVE BOX
INTERVAL HPI/OVERNIGHT EVENTS: BCx NG @ 12 hrs    STATUS POST: 6/27: Laparoscopic lysis of adhesions, converted to open lysis of adhesions, SBR x 3, temporary abdominal closure, 5L cryst, 1L 5% alb, 3 pRBC, 1 FFP, 1 plts  6/28: ex lap, removal of abthera, ileocolic resection, small bowel anastamosis, , 1.6 crystalloid, 250 5%, 175 uop   7/3: IR Gendel drained perihepatic aspiration of serous fluid.   7/5: RTOR exlap, washout, ileocolic resection with end ileostomy, blow hole colostomy, fistula, red rubber from ileostomy to small bowel anastomosis; vicryl bridging mesh; R JOYCE below ileostomy, L JOYCE at small bowel enterotomy repair; 500 LR, 500 5% albumin, 3u PRBC, 2 FFP, 400 UOP,   9/11: s/p perc cony    SUBJECTIVE: Patient seen and examined bedside with chief resident on AM rounds. He is resting comfortably in bed and has no acute complaints. Denies cp, sob, nausea, emesis, or fevers.    MEDICATIONS  (STANDING):  caspofungin IVPB 50 milliGRAM(s) IV Intermittent every 24 hours  chlorhexidine 2% Cloths 1 Application(s) Topical <User Schedule>  cholestyramine Powder (Sugar-Free) 4 Gram(s) Oral daily  dextrose 10% + sodium chloride 0.9%. 1000 milliLiter(s) (100 mL/Hr) IV Continuous <Continuous>  diphenoxylate/atropine 2 Tablet(s) Oral every 6 hours  epoetin matt-epbx (RETACRIT) Injectable 08196 Unit(s) SubCutaneous every 7 days  ergocalciferol 06025 Unit(s) Oral <User Schedule>  folic acid Injectable 1 milliGRAM(s) IV Push daily  glucagon  Injectable 1 milliGRAM(s) IntraMuscular once  heparin   Injectable 5000 Unit(s) SubCutaneous every 8 hours  influenza  Vaccine (HIGH DOSE) 0.7 milliLiter(s) IntraMuscular once  levothyroxine Injectable 40 MICROGram(s) IV Push at bedtime  loperamide Liquid 4 milliGRAM(s) Oral every 12 hours  metoprolol tartrate Injectable 5 milliGRAM(s) IV Push every 6 hours  octreotide  Injectable 100 MICROGram(s) IV Push three times a day  opium Tincture 6 milliGRAM(s) Oral every 6 hours  pantoprazole  Injectable 40 milliGRAM(s) IV Push daily  thiamine Injectable 100 milliGRAM(s) IV Push every 24 hours  ursodiol Suspension 300 milliGRAM(s) Oral every 8 hours  venlafaxine XR. 150 milliGRAM(s) Oral two times a day    MEDICATIONS  (PRN):  LORazepam   Injectable 0.5 milliGRAM(s) IV Push at bedtime PRN Anxiety  ondansetron Injectable 4 milliGRAM(s) IV Push every 8 hours PRN Nausea and/or Vomiting  simethicone 80 milliGRAM(s) Chew daily PRN Gas      Vital Signs Last 24 Hrs  T(C): 36.4 (28 Nov 2023 12:00), Max: 36.6 (28 Nov 2023 01:02)  T(F): 97.6 (28 Nov 2023 12:00), Max: 97.8 (28 Nov 2023 01:02)  HR: 89 (28 Nov 2023 16:00) (88 - 92)  BP: 145/63 (28 Nov 2023 16:00) (128/61 - 181/79)  BP(mean): 97 (28 Nov 2023 16:00) (88 - 122)  RR: 15 (28 Nov 2023 16:00) (14 - 30)  SpO2: 99% (28 Nov 2023 16:00) (89% - 100%)    Parameters below as of 28 Nov 2023 14:00  Patient On (Oxygen Delivery Method): room air      PHYSICAL EXAM:  Constitutional: A&Ox3, resting comfortably in bed  Respiratory: non labored breathing, no respiratory distress  Cardiovascular: NSR, RRR  Gastrointestinal: Abdomen soft, NTND. Right perc cony drain with bilious drainage. Fistula with wound manager with some liquid and some formed output. His wound bed is pink, without bleeding. Stoma pink and patent with red rubber intact. Ostomy with appropriate output  Genitourinary: voiding  Extremities: wwp, no calf tenderness or edema, +SCDs        I&O's Detail    27 Nov 2023 07:01  -  28 Nov 2023 07:00  --------------------------------------------------------  IN:    dextrose 10% + sodium chloride 0.9%: 2300 mL    IV PiggyBack: 50 mL    IV PiggyBack: 250 mL    Oral Fluid: 1430 mL    Sodium Chloride 0.9% Bolus: 1000 mL  Total IN: 5030 mL    OUT:    Drain (mL): 75 mL    Drain (mL): 655 mL    Drain (mL): 1525 mL    Voided (mL): 2415 mL  Total OUT: 4670 mL    Total NET: 360 mL      28 Nov 2023 07:01  -  28 Nov 2023 16:37  --------------------------------------------------------  IN:    dextrose 10% + sodium chloride 0.9%: 900 mL    IV PiggyBack: 100 mL    IV PiggyBack: 250 mL    Oral Fluid: 445 mL  Total IN: 1695 mL    OUT:    Drain (mL): 850 mL    Drain (mL): 35 mL    Drain (mL): 200 mL    Voided (mL): 865 mL  Total OUT: 1950 mL    Total NET: -255 mL          LABS:                        9.1    7.67  )-----------( 145      ( 27 Nov 2023 05:36 )             28.4     11-28    131<L>  |  105  |  38<H>  ----------------------------<  126<H>  4.6   |  16<L>  |  2.14<H>    Ca    9.0      28 Nov 2023 04:08  Phos  3.4     11-28  Mg     1.8     11-28    TPro  8.0  /  Alb  2.1<L>  /  TBili  8.5<H>  /  DBili  5.8<H>  /  AST  118<H>  /  ALT  81<H>  /  AlkPhos  217<H>  11-28    PT/INR - ( 28 Nov 2023 04:08 )   PT: 13.9 sec;   INR: 1.23          PTT - ( 28 Nov 2023 04:08 )  PTT:39.6 sec  Urinalysis Basic - ( 28 Nov 2023 04:08 )    Color: x / Appearance: x / SG: x / pH: x  Gluc: 126 mg/dL / Ketone: x  / Bili: x / Urobili: x   Blood: x / Protein: x / Nitrite: x   Leuk Esterase: x / RBC: x / WBC x   Sq Epi: x / Non Sq Epi: x / Bacteria: x        RADIOLOGY & ADDITIONAL STUDIES:

## 2023-11-28 NOTE — PROGRESS NOTE ADULT - SUBJECTIVE AND OBJECTIVE BOX
INTERVAL HPI/OVERNIGHT EVENTS:    OVERNIGHT: No overnight events.  SUBJECTIVE: Patient seen and examined at bedside. Overall feeling better, no acute complaints. He would like to get out of bed again today.    REVIEW OF SYSTEMS:    CONSTITUTIONAL: No weakness, fevers or chills  EYES/ENT: No visual changes;  No vertigo or throat pain   NECK: No pain or stiffness  RESPIRATORY: No cough, wheezing, hemoptysis; No shortness of breath  CARDIOVASCULAR: No chest pain or palpitations  GASTROINTESTINAL: No abdominal or epigastric pain. No nausea, vomiting, or hematemesis; No diarrhea or constipation. No melena or hematochezia.  GENITOURINARY: No dysuria, frequency or hematuria  NEUROLOGICAL: No numbness or weakness  SKIN: No itching, burning, rashes, or lesions   MSK: no joint pain, no joint swelling  All other review of systems is negative unless indicated above.      Allergies    penicillin (Angioedema)    Intolerances        ANTIBIOTICS/RELEVANT:  antimicrobials  caspofungin IVPB 50 milliGRAM(s) IV Intermittent every 24 hours    immunologic:  epoetin matt-epbx (RETACRIT) Injectable 13658 Unit(s) SubCutaneous every 7 days  influenza  Vaccine (HIGH DOSE) 0.7 milliLiter(s) IntraMuscular once    OTHER:  chlorhexidine 2% Cloths 1 Application(s) Topical <User Schedule>  cholestyramine Powder (Sugar-Free) 4 Gram(s) Oral daily  dextrose 10% + sodium chloride 0.9%. 1000 milliLiter(s) IV Continuous <Continuous>  diphenoxylate/atropine 2 Tablet(s) Oral every 6 hours  folic acid Injectable 1 milliGRAM(s) IV Push daily  glucagon  Injectable 1 milliGRAM(s) IntraMuscular once  heparin   Injectable 5000 Unit(s) SubCutaneous every 8 hours  levothyroxine Injectable 40 MICROGram(s) IV Push at bedtime  loperamide 4 milliGRAM(s) Oral every 6 hours  LORazepam   Injectable 0.5 milliGRAM(s) IV Push at bedtime PRN  magnesium sulfate  IVPB 1 Gram(s) IV Intermittent once  metoprolol tartrate Injectable 5 milliGRAM(s) IV Push every 6 hours  octreotide  Injectable 100 MICROGram(s) IV Push three times a day  ondansetron Injectable 4 milliGRAM(s) IV Push every 8 hours PRN  opium Tincture 6 milliGRAM(s) Oral every 6 hours  pantoprazole  Injectable 40 milliGRAM(s) IV Push daily  simethicone 80 milliGRAM(s) Chew daily PRN  thiamine Injectable 100 milliGRAM(s) IV Push every 24 hours  ursodiol Suspension 300 milliGRAM(s) Oral every 8 hours  venlafaxine XR. 150 milliGRAM(s) Oral two times a day      Objective:  Vital Signs Last 24 Hrs  T(C): 36.3 (28 Nov 2023 08:30), Max: 36.7 (27 Nov 2023 13:19)  T(F): 97.4 (28 Nov 2023 08:30), Max: 98.1 (27 Nov 2023 13:19)  HR: 92 (28 Nov 2023 12:00) (88 - 93)  BP: 153/74 (28 Nov 2023 12:00) (124/60 - 181/79)  BP(mean): 106 (28 Nov 2023 12:00) (87 - 122)  RR: 18 (28 Nov 2023 12:00) (14 - 30)  SpO2: 99% (28 Nov 2023 12:00) (89% - 100%)    Parameters below as of 28 Nov 2023 10:00  Patient On (Oxygen Delivery Method): room air      PHYSICAL EXAM:    Constitutional: WDWN resting comfortably in bed; NAD  Head: NC/AT  Eyes: PERRLA, EOMI, clear conjunctiva  Cardiac: +S1/S2, no murmurs  Abd: soft, non-tender, right side ostomy   MSK: Prior RUE PICC site c/d/i  Neurologic: AAOx3;     LABS:                        9.1    7.67  )-----------( 145      ( 27 Nov 2023 05:36 )             28.4     11-28    131<L>  |  105  |  38<H>  ----------------------------<  126<H>  4.6   |  16<L>  |  2.14<H>    Ca    9.0      28 Nov 2023 04:08  Phos  3.4     11-28  Mg     1.8     11-28    TPro  8.0  /  Alb  2.1<L>  /  TBili  8.5<H>  /  DBili  5.8<H>  /  AST  118<H>  /  ALT  81<H>  /  AlkPhos  217<H>  11-28    PT/INR - ( 28 Nov 2023 04:08 )   PT: 13.9 sec;   INR: 1.23          PTT - ( 28 Nov 2023 04:08 )  PTT:39.6 sec  Urinalysis Basic - ( 28 Nov 2023 04:08 )    Color: x / Appearance: x / SG: x / pH: x  Gluc: 126 mg/dL / Ketone: x  / Bili: x / Urobili: x   Blood: x / Protein: x / Nitrite: x   Leuk Esterase: x / RBC: x / WBC x   Sq Epi: x / Non Sq Epi: x / Bacteria: x        MICROBIOLOGY:            RECENT CULTURES:  11-27 @ 11:30  .Blood Blood-Peripheral  --  --  --    No growth at 12 hours  --  11-26 @ 17:37  .Blood Blood  --  --  --    No growth at 1 day.  --  11-25 @ 08:49  .Blood Blood-Peripheral  --  --  --    No growth at 3 days.  --  11-24 @ 10:32  .Blood Blood  --  --  --    Testing in progress  --  11-24 @ 10:20  .Blood Blood-Peripheral  --  --  --    Growth in anaerobic bottle: Candida glabrata  Aerobic Bottle: Yeast  Culture in progress  --  11-23 @ 22:06  Bile Bile  Pseudomonas aeruginosa  Enterococcus faecalis  Enterococcus faecalis  DOMENICO    Moderate Delftia acidovorans  Moderate Pseudomonas aeruginosa  Moderate Stenotrophomonas maltophilia  Few Proteus mirabilis  Moderate Enterococcus raffinosus  Moderate Enterococcus faecalis  Few Klebsiella pneumoniae  Moderate Bacteroides fragilis  --  11-23 @ 04:55  .Blood Blood  Blood Culture PCR  Blood Culture PCR  PCR    Growth in aerobic and anaerobic bottles: Candida glabrata  Sent to [facility] for sensitivity.  --      RADIOLOGY & ADDITIONAL STUDIES:

## 2023-11-28 NOTE — PROGRESS NOTE ADULT - ASSESSMENT
78M w PMH Crohn's, AFib/Flutter s/p DCCVs on amiodarone, remote ileocectomy and open appendectomy. Admitted (6/23) for SBO vs Crohns flare, s/p NGT decompression and s/p lap converted to open TRE, SBR x 3, left in discontinuity with abthera vac on (6/27), RTOR for ileocolic resection, small bowel anastomosis, and abdominal wall closure on (6/28), c/b fluid collection s/p IR aspiration of perihepatic fluid on (7/3), c/b wound dehiscence s/p RTOR exlap, washout, ileocolic resection with end ileostomy, blow hole colostomy, red rubber from ileostomy to small bowel anastomosis; vicryl bridging mesh on (7/5) transferred to SICU postoperatively for hemodynamic monitoring, with hospital course complicated by periods of severe ELVI and hyponatremia, which resolved but stepped back up for to SICU on (9/10) for acute AMS, intermittent hypoglycemia, AFib with RVR. Percutaneous Cholecystostomy placed on (9/11) for enlarged GB, PCT capped 10/5 and uncapped 10/25 for hyperbilirubinemia, Right brachial DVT, left basillic and cephalic superficial thrombus on duplex 11/2, CT 11/14 with ALDEN ground glass opacity of unclear etiology, completed empiric 7 day cefepime course 11/22, rising T-bili of unclear etiology, now sepsis w/ fungemia (mehran glabrata), likely CLABSI.    Worsening direct hyperbilirubinemia and transaminitis - c/f obstructive biliary disease   Uptrending Cr - c/f ELVI     Plan:   - Will appreciate Hepatology input   - Consider repeat MRCP   - Encourage IS/OOB   - Continue Caspo per ID   - Monitor UOP & trend Cr   - Appreciate SICU care

## 2023-11-28 NOTE — PROGRESS NOTE ADULT - ATTENDING COMMENTS
Clinically improving and repeat BCx ngtd. Pending C.glab sensis. Will give at least 2 weeks total therapy, may de-escalate to fluc after 7 days of caspo if sensitive.

## 2023-11-28 NOTE — PROGRESS NOTE ADULT - ASSESSMENT
78M w PMH Crohn's, AFib/Flutter s/p DCCVs on amiodarone, remote ileocectomy and open appendectomy. Admitted (6/23) for SBO vs Crohns flare, s/p NGT decompression and s/p lap converted to open TRE, SBR x 3, left in discontinuity with abthera vac on (6/27), RTOR for ileocolic resection, small bowel anastomosis, and abdominal wall closure on (6/28), c/b fluid collection s/p IR aspiration of perihepatic fluid on (7/3), c/b wound dehiscence s/p RTOR exlap, washout, ileocolic resection with end ileostomy, blow hole colostomy, red rubber from ileostomy to small bowel anastomosis; vicryl bridging mesh on (7/5) transferred to SICU postoperatively for hemodynamic monitoring, with hospital course complicated by periods of severe ELVI and hyponatremia, which resolved but stepped back up for to SICU on (9/10) for acute AMS, intermittent hypoglycemia, AFib with RVR. Percutaneous Cholecystostomy placed on (9/11) for enlarged GB, PCT capped 10/5 and uncapped 10/25 for hyperbilirubinemia, Right brachial DVT, left basillic and cephalic superficial thrombus on duplex 11/2, CT 11/14 with ALDEN ground glass opacity of unclear etiology, completed empiric 7day cefepime course 11/22, rising T-bili of unclear etilogy, now sepsis w/ fungemia (mehran glabrata), likely CLABSI.         Restart HSQ today  Hold imodium  Rest of care per SICU

## 2023-11-28 NOTE — PROGRESS NOTE ADULT - ASSESSMENT
78M w PMH Crohn's, AFib/Flutter s/p DCCVs on amiodarone, remote ileocectomy and open appendectomy. Admitted (6/23) for SBO vs Crohns flare, s/p NGT decompression and s/p lap converted to open TRE, SBR x 3, left in discontinuity with abthera vac on (6/27), RTOR for ileocolic resection, small bowel anastomosis, and abdominal wall closure on (6/28), c/b fluid collection s/p IR aspiration of perihepatic fluid on (7/3), c/b wound dehiscence s/p RTOR exlap, washout, ileocolic resection with end ileostomy, blow hole colostomy, red rubber from ileostomy to small bowel anastomosis; vicryl bridging mesh on (7/5) transferred to SICU postoperatively for hemodynamic monitoring, with hospital course complicated by periods of severe ELVI and hyponatremia, which resolved but stepped back up for to SICU on (9/10) for acute AMS, intermittent hypoglycemia, AFib with RVR. Percutaneous Cholecystostomy placed on (9/11) for enlarged GB, PCT capped 10/5 and uncapped 10/25 for hyperbilirubinemia, Right brachial DVT, left basillic and cephalic superficial thrombus on duplex 11/2, CT 11/14 with ALDEN ground glass opacity of unclear etiology, completed empiric 7day cefepime course 11/22, rising T-bili of unclear etilogy, now w sepsis w/ fungemia (mehran glabrata), likely CLABSI.       NEURO: AMS resolved, likely septic encephalopathy, EEG neg. Hx of depression - cont Effexor. Nausea: Zofran PRN. Anxiety: Lorazepam PRN.  CV: Hx Afib/flutter: s/p DCCV, Amio stopped due to persistent transaminitis. Continue Metoprolol 5q6 with hold parameters. Hx HLD: Lipitor held given high LFTs. TTE  (7/18) - PASP 64mmHg, EF 65-70%,  Hx HTN -  BP ok without Norvasc and Losartan.   PULM: CT from 11/14 with ALDEN ground glass opacity of unclear etiology. COVID negative. RVP negative. completed 7d empiric cefepime 11/22 to cover for potential PNA. Now on RA.   GI/FEN: High protein diet. Cont Ensure max x1/day. 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 High output EC fistula: cont Octreotide, Lomotil, Tincture of Opium, start to wean Imodium. Cholestyramine 10/18. Transaminitis elevated T bili of unclear origin, s/p Perc Deb on 9/11; MRCP 10/30 no obstruction, seen by Hepatology started on ursodiol, RUQ US 11/25 CBD only 5mm, hepatic vessels patent. consider repeat MRCP, cont PPI. monitor drainage from fistula, bolus as needed to keep I&O even. Ensure once daily with LPS. getting D10NS@100 for now while holding TPN. Folate, thiamine added.  : Voids. ELVI, Cr peaked at 2.39. trended down. still making urine. follow closely.   ENDO: Hx hypothyroid: IV Synthroid, TSH wnl on 10/23  ID: chest CT with ALDEN with ground glass opacities, unclear etiology. completed empiric 7days cefepime (11/15-11/22), bld cx from 11/22 grew candida glabrata. likely CLABSI. PICC removed 11/24. ID consulted. added caspofungin (11/24--) . repeat surveillance blood cx. ophthalmology evaulated - normal ocular exam. echo no vegetation, off cefepime/flagyl. recent MRSA swab negative, RVP panel negative, COVID negative this past week. Cont Nystatin powder // PREVIOUSLY had C. tertium, Lactobacillis from his IR cx 7/3, and candida albicans, lactobacillus, vanc sensitive E faecium, vanc resistant E gallinarum, vanc resistant E casseliflavus, lactobacillus from his OR cx 7/5. Completed course of abx with Imipenem (9/10-9/15). Imipenem (8/26--) imipenem (6/30-7/12, 7/23-7/30), Dapto (6/30-7/5 and 7/23-7/24). Empiric dapto (8/23-24) and cefepime (8/23-24).   PPX: SCDs, SQH, was on Therapeutic lovenox bid for R brachial vein DVT, but will hold off on hep gtt since repeat US Duplex negative.  HEME: Anemia: continue Epogen weekly. S/p Iron Sucrose 200 qd x 3 days for chronic anemia.  LINES: PIVs, ///DC'd: ZAHEER PICC (9/1-11/1 ), HOLLIS PICC: (11/1-11/24)   WOUNDS/DRAINS: Abdominal wound and fistula with wound manager in place dressing change Tuesday and Friday. RLQ Ostomy. IR perc deb. // DC: L Clay drain.  PT: Ambulate as tolerated   DISPO: SICU due to complicated hospital course.

## 2023-11-28 NOTE — PROGRESS NOTE ADULT - ASSESSMENT
78M w/ hx Crohn’s disease and prolonged hospitalization since 6/23, admitted for SBO s/p open TRE, SBR x 3, left in discontinuity with abthera, then RTOR for ileocolic resection and small bowel anastamosis with abdominal closure (6/28) c/b fluid collection/feculent peritonitis s/p IR aspiration (7/3) and then exlap, washout, ileocolic resection with end ileostomy, blow hole colostomy, red rubber from ileostomy to small bowel anastomosis, and vicryl bridging mesh (7/5) with abdominal cx at that time w/ VRE.casseliflavus, VRE.gallinarum E.faecium, Clostridium tertium, L.rhamnosus, C.albicans, then possible acalculous cholecystitis s/p perc cony (9/11) with negative bile fluid cx, then had CT with ALDEN GGOs for which received 7 days cefepime EOT 11/22, but then developed fevers up to 105 on 11/23, associated with hypotension, and rising leukocytosis to 12k, UA neg, BCx x2 with C.glabrata. RUE PICC line removed 11/24 (had been receiving TPN through this line).     # C.glabrata CLABSI (from RUE PICC for TPN)  - S/p removal of PICC 11/24. Surveillance BCx from shortly after PICC removal on 11/24 also positive, but second BCx from 11/24 and BCx from 11/25, 11/26, and 11/27 ngtd  - Continue IV caspofungin 50mg daily, will likely need 2 week course   - f/u blood culture sensitivities  - recommend 72 hours of negative blood cultures and 1 week of Caspofungin treatment before new central line placement  - TTE neg 11/24 and ophtho exam negative 11/24    ID Team 1 will follow 78M w/ hx Crohn’s disease and prolonged hospitalization since 6/23, admitted for SBO s/p open TRE, SBR x 3, left in discontinuity with abthera, then RTOR for ileocolic resection and small bowel anastamosis with abdominal closure (6/28) c/b fluid collection/feculent peritonitis s/p IR aspiration (7/3) and then exlap, washout, ileocolic resection with end ileostomy, blow hole colostomy, red rubber from ileostomy to small bowel anastomosis, and vicryl bridging mesh (7/5) with abdominal cx at that time w/ VRE.casseliflavus, VRE.gallinarum E.faecium, Clostridium tertium, L.rhamnosus, C.albicans, then possible acalculous cholecystitis s/p perc cony (9/11) with negative bile fluid cx, then had CT with ALDEN GGOs for which received 7 days cefepime EOT 11/22, but then developed fevers up to 105 on 11/23, associated with hypotension, and rising leukocytosis to 12k, UA neg, BCx x2 with C.glabrata. RUE PICC line removed 11/24 (had been receiving TPN through this line).     # C.glabrata CLABSI (from RUE PICC for TPN)  - S/p removal of PICC 11/24. Surveillance BCx from shortly after PICC removal on 11/24 also positive, but second BCx from 11/24 and BCx from 11/25, 11/26, and 11/27 ngtd  - Continue IV caspofungin 50mg daily, will likely need 2 week course   - f/u blood culture sensitivities  - recommend 72 hours of negative blood cultures and at least 1 week of Caspofungin treatment before new central line placement/resuming TPN  - TTE neg 11/24 and ophtho exam negative 11/24    ID Team 1 will follow

## 2023-11-29 NOTE — PROGRESS NOTE ADULT - ASSESSMENT
78M w PMH Crohn's, AFib/Flutter s/p DCCVs on amiodarone, remote ileocectomy and open appendectomy. Admitted (6/23) for SBO vs Crohns flare, s/p NGT decompression and s/p lap converted to open TRE, SBR x 3, left in discontinuity with abthera vac on (6/27), RTOR for ileocolic resection, small bowel anastomosis, and abdominal wall closure on (6/28), c/b fluid collection s/p IR aspiration of perihepatic fluid on (7/3), c/b wound dehiscence s/p RTOR exlap, washout, ileocolic resection with end ileostomy, blow hole colostomy, red rubber from ileostomy to small bowel anastomosis; vicryl bridging mesh on (7/5) transferred to SICU postoperatively for hemodynamic monitoring, with hospital course complicated by periods of severe ELVI and hyponatremia, which resolved but stepped back up for to SICU on (9/10) for acute AMS, intermittent hypoglycemia, AFib with RVR. Percutaneous Cholecystostomy placed on (9/11) for enlarged GB, PCT capped 10/5 and uncapped 10/25 for hyperbilirubinemia, Right brachial DVT, left basillic and cephalic superficial thrombus on duplex 11/2, CT 11/14 with ALDEN ground glass opacity of unclear etiology, completed empiric 7 day cefepime course 11/22, rising T-bili of unclear etiology, now sepsis w/ fungemia (mehran glabrata), likely CLABSI.    Worsening direct hyperbilirubinemia and transaminitis - c/f obstructive biliary disease. HPB anatomy not properly visualized on last RUQ ultrasound 11/25 due to medical condition   Uptrending Cr - c/f ELVI     Plan:   - Will appreciate Hepatology input   - Consider repeat MRCP   - Encourage IS/OOB   - Continue Caspo per ID   - Monitor UOP & trend Cr   - Appreciate wound care team management   - Appreciate SICU care

## 2023-11-29 NOTE — PROVIDER CONTACT NOTE (CRITICAL VALUE NOTIFICATION) - SITUATION
Pt wound culture sent from bile drain. Pt positive for Pseudomonas Aeruginosa
Pt A+Ox3. Initially admitted for small bowel obstruction, s/p exlap, washout ,ileocolic resection with end ileostomy, blow hole colostomy, fistula red rubber from ileostomy to small bowel anastomosis.Pt c/o dizziness and feeling tired a few days ago. Last Hgb four days ago was 7.3, now 6.2.
Patient upgraded from 8L, labs drawn by MD.
Pt hypoglycemic 47

## 2023-11-29 NOTE — PROGRESS NOTE ADULT - ASSESSMENT
78M w PMH Crohn's, AFib/Flutter s/p DCCVs on amiodarone, remote ileocectomy and open appendectomy. Admitted (6/23) for SBO vs Crohns flare, s/p NGT decompression and s/p lap converted to open TRE, SBR x 3, left in discontinuity with abthera vac on (6/27), RTOR for ileocolic resection, small bowel anastomosis, and abdominal wall closure on (6/28), c/b fluid collection s/p IR aspiration of perihepatic fluid on (7/3), c/b wound dehiscence s/p RTOR exlap, washout, ileocolic resection with end ileostomy, blow hole colostomy, red rubber from ileostomy to small bowel anastomosis; vicryl bridging mesh on (7/5) transferred to SICU postoperatively for hemodynamic monitoring, with hospital course complicated by periods of severe ELVI and hyponatremia, which resolved but stepped back up for to SICU on (9/10) for acute AMS, intermittent hypoglycemia, AFib with RVR. Percutaneous Cholecystostomy placed on (9/11) for enlarged GB, PCT capped 10/5 and uncapped 10/25 for hyperbilirubinemia, Right brachial DVT, left basillic and cephalic superficial thrombus on duplex 11/2, CT 11/14 with ALDEN ground glass opacity of unclear etiology, completed empiric 7day cefepime course 11/22, rising T-bili of unclear etilogy, now w sepsis w/ fungemia (mehran glabrata), likely CLABSI.       NEURO: AMS resolved, likely septic encephalopathy, EEG neg. Hx of depression - cont Effexor. Nausea: Zofran PRN. Anxiety: Lorazepam PRN.  CV: Hx Afib/flutter: s/p DCCV, Amio stopped due to persistent transaminitis. Continue Metoprolol 5q6 with hold parameters. Hx HLD: Lipitor held given high LFTs. TTE  (7/18) - PASP 64mmHg, EF 65-70%, Hx HTN -  increased Norvasc to 10, holding Losartan. Appears dry on exam, Cr increased, gave 1L IVF, repeat BMP this afternoon.  PULM: CT from 11/14 with ALDEN ground glass opacity of unclear etiology. COVID negative. RVP negative. completed 7d empiric cefepime 11/22 to cover for potential PNA. Now on RA.   GI/FEN: Low res, low fat, high protein diet. Cont Ensure max x1/day. 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 High output EC fistula: cont Octreotide, Lomotil, Tincture of Opium, start to wean Imodium. Cholestyramine 10/18. Transaminitis elevated T bili of unclear origin, s/p Perc Deb on 9/11; MRCP 10/30 no obstruction, seen by Hepatology started on ursodiol, RUQ US 11/25 CBD only 5mm, hepatic vessels patent. consider repeat MRCP, cont PPI. monitor drainage from fistula, bolus as needed to keep I&O even. Ensure once daily with LPS. getting D10NS@100 for now while holding TPN. Folate, thiamine added.  : Voids. ELVI, Cr peaked at 2.39. trended down. still making urine. follow closely.   ENDO: Hx hypothyroid: IV Synthroid, TSH wnl on 10/23  ID: chest CT with ALDEN with ground glass opacities, unclear etiology. completed empiric 7days cefepime (11/15-11/22), bld cx from 11/22 grew candida glabrata. likely CLABSI. PICC removed 11/24. ID consulted. added caspofungin (11/24--) . repeat surveillance blood cx. ophthalmology evaulated - normal ocular exam. echo no vegetation, off cefepime/flagyl. recent MRSA swab negative, RVP panel negative, COVID negative this past week. Cont Nystatin powder // PREVIOUSLY had C. tertium, Lactobacillis from his IR cx 7/3, and candida albicans, lactobacillus, vanc sensitive E faecium, vanc resistant E gallinarum, vanc resistant E casseliflavus, lactobacillus from his OR cx 7/5. Completed course of abx with Imipenem (9/10-9/15). Imipenem (8/26--) imipenem (6/30-7/12, 7/23-7/30), Dapto (6/30-7/5 and 7/23-7/24). Empiric dapto (8/23-24) and cefepime (8/23-24).   PPX: SCDs, SQH, was on Therapeutic lovenox bid for R brachial vein DVT, but will hold off on hep gtt since repeat US Duplex negative.  HEME: Anemia: continue Epogen weekly. S/p Iron Sucrose 200 qd x 3 days for chronic anemia.  LINES: PIVs // DC: LUE PICC (9/1-11/1 ), RUE PICC: (11/1-11/24)   WOUNDS/DRAINS: Abdominal wound and fistula with wound manager in place dressing change Tuesday and Friday. RLQ Ostomy. IR perc deb tube. // DC: L Clay drain.  PT: Ambulate as tolerated   DISPO: SICU due to complicated hospital course.

## 2023-11-29 NOTE — PROGRESS NOTE ADULT - SUBJECTIVE AND OBJECTIVE BOX
SUBJECTIVE:  Patient seen and examined at bedside this AM   No acute overnight events    No acute distress this AM. Resting comfortably without any complaints. Tolerating diet. Voiding spontaneously       MEDICATIONS  (STANDING):  amLODIPine   Tablet 10 milliGRAM(s) Oral every 24 hours  caspofungin IVPB 50 milliGRAM(s) IV Intermittent every 24 hours  chlorhexidine 2% Cloths 1 Application(s) Topical <User Schedule>  cholestyramine Powder (Sugar-Free) 4 Gram(s) Oral daily  dextrose 10% + sodium chloride 0.9%. 1000 milliLiter(s) (100 mL/Hr) IV Continuous <Continuous>  diphenoxylate/atropine 2 Tablet(s) Oral every 6 hours  epoetin matt-epbx (RETACRIT) Injectable 24271 Unit(s) SubCutaneous every 7 days  ergocalciferol 74690 Unit(s) Oral <User Schedule>  folic acid Injectable 1 milliGRAM(s) IV Push daily  glucagon  Injectable 1 milliGRAM(s) IntraMuscular once  heparin   Injectable 5000 Unit(s) SubCutaneous every 8 hours  influenza  Vaccine (HIGH DOSE) 0.7 milliLiter(s) IntraMuscular once  levothyroxine Injectable 40 MICROGram(s) IV Push at bedtime  metoprolol tartrate Injectable 5 milliGRAM(s) IV Push every 6 hours  octreotide  Injectable 100 MICROGram(s) IV Push three times a day  opium Tincture 6 milliGRAM(s) Oral every 6 hours  pantoprazole  Injectable 40 milliGRAM(s) IV Push daily  thiamine Injectable 100 milliGRAM(s) IV Push every 24 hours  ursodiol Suspension 300 milliGRAM(s) Oral every 8 hours  venlafaxine XR. 150 milliGRAM(s) Oral two times a day    MEDICATIONS  (PRN):  LORazepam   Injectable 0.5 milliGRAM(s) IV Push at bedtime PRN Anxiety  ondansetron Injectable 4 milliGRAM(s) IV Push every 8 hours PRN Nausea and/or Vomiting  simethicone 80 milliGRAM(s) Chew daily PRN Gas      Vital Signs Last 24 Hrs  T(C): 36.6 (29 Nov 2023 10:00), Max: 36.9 (29 Nov 2023 00:53)  T(F): 97.9 (29 Nov 2023 10:00), Max: 98.4 (29 Nov 2023 00:53)  HR: 86 (29 Nov 2023 13:00) (85 - 91)  BP: 165/80 (29 Nov 2023 13:00) (130/61 - 176/84)  BP(mean): 115 (29 Nov 2023 13:00) (88 - 121)  RR: 18 (29 Nov 2023 13:00) (14 - 25)  SpO2: 98% (29 Nov 2023 13:00) (91% - 100%)    Parameters below as of 29 Nov 2023 12:00  Patient On (Oxygen Delivery Method): room air      Physical Exam:  Neurological: AAOx3, CNII-XII intact,  strength 5/5 b/l  ENT: mucus membrane moist  Cardiovascular: RRR  Respiratory: CTA  Gastrointestinal: soft, NT, ND, right perc cony with dark bilious drainage. fistula with wound manager with grayish colored liquid and some formed content. wound bed pink, no bleeding. right sided ostomy with red rubber intact.   Extremities: warm, no dependent edema  Vascular: no cyanosis/erythema  Skin: no rashes  MSK: no joint swelling.       I&O's Summary    28 Nov 2023 07:01  -  29 Nov 2023 07:00  --------------------------------------------------------  IN: 3585 mL / OUT: 4965 mL / NET: -1380 mL    29 Nov 2023 07:01  -  29 Nov 2023 13:10  --------------------------------------------------------  IN: 1300 mL / OUT: 800 mL / NET: 500 mL        LABS:                        9.3    9.32  )-----------( 171      ( 29 Nov 2023 05:30 )             29.0     11-29    134<L>  |  107  |  39<H>  ----------------------------<  120<H>  4.7   |  16<L>  |  2.58<H>    Ca    8.9      29 Nov 2023 05:30  Phos  4.7     11-29  Mg     1.9     11-29    TPro  8.0  /  Alb  2.3<L>  /  TBili  7.8<H>  /  DBili  5.4<H>  /  AST  114<H>  /  ALT  80<H>  /  AlkPhos  247<H>  11-29    PT/INR - ( 29 Nov 2023 05:30 )   PT: 13.7 sec;   INR: 1.21          PTT - ( 29 Nov 2023 05:30 )  PTT:39.4 sec  Urinalysis Basic - ( 29 Nov 2023 05:30 )    Color: x / Appearance: x / SG: x / pH: x  Gluc: 120 mg/dL / Ketone: x  / Bili: x / Urobili: x   Blood: x / Protein: x / Nitrite: x   Leuk Esterase: x / RBC: x / WBC x   Sq Epi: x / Non Sq Epi: x / Bacteria: x      CAPILLARY BLOOD GLUCOSE      POCT Blood Glucose.: 95 mg/dL (29 Nov 2023 11:23)  POCT Blood Glucose.: 87 mg/dL (28 Nov 2023 23:28)    LIVER FUNCTIONS - ( 29 Nov 2023 05:30 )  Alb: 2.3 g/dL / Pro: 8.0 g/dL / ALK PHOS: 247 U/L / ALT: 80 U/L / AST: 114 U/L / GGT: x             RADIOLOGY & ADDITIONAL STUDIES:

## 2023-11-29 NOTE — PROGRESS NOTE ADULT - SUBJECTIVE AND OBJECTIVE BOX
INTERVAL/OVERNIGHT EVENTS: NAEO. BCx growing GPR--likely contaminant given no growth in 2nd bottle, afebrile, WBC stable.     SUBJECTIVE: This AM doing well. No N/V or abd pain. Voids. States he feels like his mouth is dry and thirsty.    Neurologic Medications  LORazepam   Injectable 0.5 milliGRAM(s) IV Push at bedtime PRN Anxiety  ondansetron Injectable 4 milliGRAM(s) IV Push every 8 hours PRN Nausea and/or Vomiting  opium Tincture 6 milliGRAM(s) Oral every 6 hours  venlafaxine XR. 150 milliGRAM(s) Oral two times a day    Cardiovascular Medications  amLODIPine   Tablet 10 milliGRAM(s) Oral every 24 hours  metoprolol tartrate Injectable 5 milliGRAM(s) IV Push every 6 hours    Gastrointestinal Medications  dextrose 10% + sodium chloride 0.9%. 1000 milliLiter(s) IV Continuous <Continuous>  diphenoxylate/atropine 2 Tablet(s) Oral every 6 hours  ergocalciferol 30121 Unit(s) Oral <User Schedule>  folic acid Injectable 1 milliGRAM(s) IV Push daily  pantoprazole  Injectable 40 milliGRAM(s) IV Push daily  simethicone 80 milliGRAM(s) Chew daily PRN Gas  thiamine Injectable 100 milliGRAM(s) IV Push every 24 hours  ursodiol Suspension 300 milliGRAM(s) Oral every 8 hours    Hematologic/Oncologic Medications  epoetin matt-epbx (RETACRIT) Injectable 34330 Unit(s) SubCutaneous every 7 days  heparin   Injectable 5000 Unit(s) SubCutaneous every 8 hours  influenza  Vaccine (HIGH DOSE) 0.7 milliLiter(s) IntraMuscular once    Antimicrobial/Immunologic Medications  caspofungin IVPB 50 milliGRAM(s) IV Intermittent every 24 hours    Endocrine/Metabolic Medications  cholestyramine Powder (Sugar-Free) 4 Gram(s) Oral daily  glucagon  Injectable 1 milliGRAM(s) IntraMuscular once  levothyroxine Injectable 40 MICROGram(s) IV Push at bedtime  octreotide  Injectable 100 MICROGram(s) IV Push three times a day    Topical/Other Medications  chlorhexidine 2% Cloths 1 Application(s) Topical <User Schedule>    MEDICATIONS  (PRN):  LORazepam   Injectable 0.5 milliGRAM(s) IV Push at bedtime PRN Anxiety  ondansetron Injectable 4 milliGRAM(s) IV Push every 8 hours PRN Nausea and/or Vomiting  simethicone 80 milliGRAM(s) Chew daily PRN Gas    I&O's Detail    28 Nov 2023 07:01  -  29 Nov 2023 07:00  --------------------------------------------------------  IN:    dextrose 10% + sodium chloride 0.9%: 2400 mL    IV PiggyBack: 100 mL    IV PiggyBack: 250 mL    Oral Fluid: 835 mL  Total IN: 3585 mL    OUT:    Drain (mL): 1775 mL    Drain (mL): 540 mL    Drain (mL): 35 mL    Voided (mL): 2615 mL  Total OUT: 4965 mL    Total NET: -1380 mL      29 Nov 2023 07:01  -  29 Nov 2023 13:04  --------------------------------------------------------  IN:    dextrose 10% + sodium chloride 0.9%: 300 mL    Sodium Chloride 0.9% Bolus: 1000 mL  Total IN: 1300 mL    OUT:    Drain (mL): 375 mL    Drain (mL): 75 mL    Voided (mL): 350 mL  Total OUT: 800 mL    Total NET: 500 mL    Vital Signs Last 24 Hrs  T(C): 36.6 (29 Nov 2023 10:00), Max: 36.9 (29 Nov 2023 00:53)  T(F): 97.9 (29 Nov 2023 10:00), Max: 98.4 (29 Nov 2023 00:53)  HR: 87 (29 Nov 2023 12:00) (85 - 91)  BP: 169/79 (29 Nov 2023 12:00) (130/61 - 176/84)  BP(mean): 113 (29 Nov 2023 12:00) (88 - 121)  RR: 17 (29 Nov 2023 12:00) (14 - 25)  SpO2: 100% (29 Nov 2023 12:00) (91% - 100%)    Parameters below as of 29 Nov 2023 12:00  Patient On (Oxygen Delivery Method): room air    Neurological: AAOx3, CNII-XII intact,  strength 5/5 b/l  ENT: mucus membrane moist  Cardiovascular: RRR  Respiratory: CTA  Gastrointestinal: soft, NT, ND, right perc cony with dark bilious drainage. fistula with wound manager with grayish colored liquid and some formed content. wound bed pink, no bleeding. right sided ostomy with red rubber intact.   Extremities: warm, no dependent edema  Vascular: no cyanosis/erythema  Skin: no rashes  MSK: no joint swelling.     LABS:                        9.3    9.32  )-----------( 171      ( 29 Nov 2023 05:30 )             29.0     11-29    134<L>  |  107  |  39<H>  ----------------------------<  120<H>  4.7   |  16<L>  |  2.58<H>    Ca    8.9      29 Nov 2023 05:30  Phos  4.7     11-29  Mg     1.9     11-29    TPro  8.0  /  Alb  2.3<L>  /  TBili  7.8<H>  /  DBili  5.4<H>  /  AST  114<H>  /  ALT  80<H>  /  AlkPhos  247<H>  11-29    PT/INR - ( 29 Nov 2023 05:30 )   PT: 13.7 sec;   INR: 1.21        PTT - ( 29 Nov 2023 05:30 )  PTT:39.4 sec  Urinalysis Basic - ( 29 Nov 2023 05:30 )    Color: x / Appearance: x / SG: x / pH: x  Gluc: 120 mg/dL / Ketone: x  / Bili: x / Urobili: x   Blood: x / Protein: x / Nitrite: x   Leuk Esterase: x / RBC: x / WBC x   Sq Epi: x / Non Sq Epi: x / Bacteria: x    RADIOLOGY & ADDITIONAL STUDIES:    Culture - Blood (collected 11-27-23 @ 11:30)  Source: .Blood Blood-Peripheral  Preliminary Report (11-29-23 @ 13:00):    No growth at 2 days.    Culture - Blood (collected 11-27-23 @ 11:30)  Source: .Blood Blood-Peripheral  Preliminary Report (11-29-23 @ 13:00):    No growth at 2 days.    Culture - Blood (collected 11-26-23 @ 17:37)  Source: .Blood Blood  Preliminary Report (11-28-23 @ 18:00):    No growth at 2 days.    Culture - Blood (collected 11-26-23 @ 17:37)  Source: .Blood Blood  Gram Stain (11-28-23 @ 21:38):    Anaerobic Bottle: Gram Positive Rods    Result called to and read back byChico Orlando RN 11/28/2023 21:38:02    ***Blood Panel PCR results on this specimen are available    approximately 3 hours after the Gram stain result.***    Gram stain, PCR, and/or culture results may not always    correspond due to difference in methodologies.    ************************************************************    This PCR assay was performed using [employer] panel.    This assay tests for bacterial, fungal and resistance gene    targets.  Preliminary Report (11-28-23 @ 21:38):    Culture in progress  Organism: Blood Culture PCR (11-28-23 @ 23:00)  Organism: Blood Culture PCR (11-28-23 @ 23:00)      -  Bacteroides fragilis: Nondet      -  Streptococcus pneumoniae: Nondet      -  Staphylococcus epidermidis: Nondet      -  Cryptococcus neoformans: Nondet      -  Coagulase negative Staphylococcus: Nondet      -  Streptococcus agalactiae (Group B): Nondet      -  Staphylococcus epidermidis, Methicillin resistant: Nondet      -  Listeria monocytogenes: Nondet      -  Klebsiella oxytoca: Nondet      -  Coagulase negative Staphylococcus, Methicillin resistant: Nondet      -  Salmonella species: Nondet      -  Proteus species: Nondet      -  Candida tropicalis: Nondet      -  Acinetobacter baumanii: Nondet      -  Enterococcus faecalis,VRE: Nondet      -  Candida krusei: Nondet      -  Escherichia coli: Nondet      Method Type: PCR      -  K. pneumoniae group: Nondet      -  Stenotrophomonas maltophilia: Nondet      -  Haemophilus influenzae: Nondet      -  Pseudomonas aeruginosa: Nondet      -  Candida auris: Nondet      -  Enterococcus faecalis: Nondet      -  Staphylococcus lugdunensis, Methicillin resistant: Nondet      -  Enterobacter cloacae complex: Nondet      -  Candida glabrata: Nondet      -  Enterococcus faecium: Nondet      -  Streptococcus sp. (Not Grp A, B or S pneumoniae): Nondet      -  Klebsiella aerogenes: Nondet      -  Enterococcus faecium,VRE: Nondet      -  Streptococcus pyogenes (Group A): Nondet      -  Neisseria meningitidis: Nondet      -  Methicillin resistant Staphylococcus aureus (MRSA): Nondet      -  Staphylococcus lugdunensis: Nondet      -  Methicillin SENSITIVE Staphylococcus aureus (MSSA): Nondet      -  Candida albicans: Nondet      -  Enterobacterales: Nondet      -  Candida parapsilosis: Nondet      -  Serratia marcescens: Nondet    Culture - Blood (collected 11-25-23 @ 08:49)  Source: .Blood Blood-Peripheral  Preliminary Report (11-29-23 @ 10:00):    No growth at 4 days.

## 2023-11-30 NOTE — PROGRESS NOTE ADULT - NS ATTEND AMEND GEN_ALL_CORE FT
rising creatinine, persistently elevated bili  obtaining U/S studies today  Renal consulted  continue caspofungin per ID  Decision-making high complexity

## 2023-11-30 NOTE — PROGRESS NOTE ADULT - ASSESSMENT
78M w PMH Crohn's, AFib/Flutter s/p DCCVs on amiodarone, remote ileocectomy and open appendectomy. Admitted (6/23) for SBO vs Crohns flare, s/p NGT decompression and s/p lap converted to open TRE, SBR x 3, left in discontinuity with abthera vac on (6/27), RTOR for ileocolic resection, small bowel anastomosis, and abdominal wall closure on (6/28), c/b fluid collection s/p IR aspiration of perihepatic fluid on (7/3), c/b wound dehiscence s/p RTOR exlap, washout, ileocolic resection with end ileostomy, blow hole colostomy, red rubber from ileostomy to small bowel anastomosis; vicryl bridging mesh on (7/5) transferred to SICU postoperatively for hemodynamic monitoring, with hospital course complicated by periods of severe ELVI and hyponatremia, which resolved but stepped back up for to SICU on (9/10) for acute AMS, intermittent hypoglycemia, AFib with RVR. Percutaneous Cholecystostomy placed on (9/11) for enlarged GB, PCT capped 10/5 and uncapped 10/25 for hyperbilirubinemia, Right brachial DVT, left basillic and cephalic superficial thrombus on duplex 11/2, CT 11/14 with ALDEN ground glass opacity of unclear etiology, completed empiric 7day cefepime course 11/22, rising T-bili of unclear etilogy, now w fungemia (candida glabrata) CLABSI, and worsenening renal function.    NEURO: AMS resolved, likely septic encephalopathy, EEG neg. Hx of depression - cont Effexor. Nausea: Zofran PRN. Anxiety: Lorazepam PRN.  CV: Hx Afib/flutter: s/p DCCV, Amio stopped due to persistent transaminitis. Continue Metoprolol 5q6 with hold parameters. Hx HLD: Lipitor held given high LFTs. TTE  (7/18) - PASP 64mmHg, EF 65-70%, Hx HTN -  stopped PO Norvasc as unlikely absorbing, switch to hydralazine IV, holding Losartan. Appears euvolemic on exam.  PULM: CT from 11/14 with ALDEN ground glass opacity of unclear etiology. COVID negative. RVP negative. completed 7d empiric cefepime 11/22 to cover for potential PNA. Now on RA.   GI/FEN: Low res, low fat, high protein diet. Cont Ensure max x1/day. 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 High output EC fistula: cont Octreotide, wean Lomotil, Tincture of Opium. Cholestyramine 10/18. Transaminitis elevated T bili of unclear origin--consult to Team 1 Sgy, s/p Perc Deb on 9/11; MRCP 10/30 no obstruction, seen by Hepatology started on ursodiol, RUQ US 11/25 CBD only 5mm, hepatic vessels patent. Requesting repeat Duplex w portal venous and arterial system, as well as image of ampulla. MRCP neg, cont PPI. Monitor drainage from fistula, bolus as needed to keep I&O even. Ensure once daily with LPS. On D10NS@100 for now while holding TPN. Folate, thiamine added.  : Voids. ELVI, Cr peaking, Urine lytes FeUrea Intrinsic ATN, unlikely volume down as spec grav low and good UOP. MARLO Duplex pending, RP US pending, CK wnl, Renal consult placed.   ENDO: Hx hypothyroid: IV Synthroid, TSH wnl on 10/23  ID: Per ID, unlikely Caspo leading to ELVI, so will continue course. chest CT with ALDEN with ground glass opacities, unclear etiology. completed empiric 7days cefepime (11/15-11/22), bld cx from 11/22 grew candida glabrata. likely CLABSI. PICC removed 11/24. ID consulted. added caspofungin (11/24--) . repeat surveillance blood cx NGTD. ophthalmology evaulated - normal ocular exam. echo no vegetation, off cefepime/flagyl. recent MRSA swab negative, RVP panel negative, COVID negative this past week. Cont Nystatin powder // PREVIOUSLY had C. tertium, Lactobacillis from his IR cx 7/3, and candida albicans, lactobacillus, vanc sensitive E faecium, vanc resistant E gallinarum, vanc resistant E casseliflavus, lactobacillus from his OR cx 7/5. Completed course of abx with Imipenem (9/10-9/15). Imipenem (8/26--) imipenem (6/30-7/12, 7/23-7/30), Dapto (6/30-7/5 and 7/23-7/24). Empiric dapto (8/23-24) and cefepime (8/23-24).   PPX: SCDs, SQH, was on Therapeutic lovenox bid for R brachial vein DVT, but will hold off on hep gtt since repeat US Duplex negative.  HEME: Anemia: continue Epogen weekly. S/p Iron Sucrose 200 qd x 3 days for chronic anemia.  LINES: PIVs // DC: LUE PICC (9/1-11/1 ), RUE PICC: (11/1-11/24)   WOUNDS/DRAINS: Abdominal wound and fistula with wound manager in place dressing change Tuesday and Friday. RLQ Ostomy. IR perc deb tube. // DC: L Clay drain.  PT: Ambulate as tolerated   DISPO: SICU due to complicated hospital course.

## 2023-11-30 NOTE — PROGRESS NOTE ADULT - SUBJECTIVE AND OBJECTIVE BOX
Nephrology progress note    Reconsulted for patient with recurrent ELVI. Patient previously at renal baseline creatinine 0.9-1.0, one week ago diagnosed with candidemia and started on caspofungin. Also noted to have hypotensive episode which resolved with fluid resuscitation and BP now stable. Additionally patient has significant GI output through conrado pouch, ileostomy, PCT (>2L/day). Urine output has been adequate 1-2+L per day. Overall patient has been net fluid positive since ELVI began developing. UA obtained with UPCR 0.8, Rebeca 118, 4 RBCs noted otherwise unremarkable. SPEP obtained by primary team with increased gamma globulin and M-spike, immunofixation with IgG lambda.    Allergies:  penicillin (Angioedema)    Hospital Medications:   MEDICATIONS  (STANDING):  albumin human 25% IVPB 50 milliLiter(s) IV Intermittent every 8 hours  caspofungin IVPB 50 milliGRAM(s) IV Intermittent every 24 hours  chlorhexidine 2% Cloths 1 Application(s) Topical <User Schedule>  cholestyramine Powder (Sugar-Free) 4 Gram(s) Oral daily  dextrose 10% + sodium chloride 0.9%. 1000 milliLiter(s) (100 mL/Hr) IV Continuous <Continuous>  epoetin matt-epbx (RETACRIT) Injectable 55132 Unit(s) SubCutaneous every 7 days  ergocalciferol 78627 Unit(s) Oral <User Schedule>  folic acid Injectable 1 milliGRAM(s) IV Push daily  glucagon  Injectable 1 milliGRAM(s) IntraMuscular once  heparin   Injectable 5000 Unit(s) SubCutaneous every 8 hours  hydrALAZINE Injectable 10 milliGRAM(s) IV Push every 8 hours  levothyroxine Injectable 30 MICROGram(s) IV Push at bedtime  metoprolol tartrate Injectable 5 milliGRAM(s) IV Push every 6 hours  octreotide  Injectable 100 MICROGram(s) IV Push three times a day  opium Tincture 6 milliGRAM(s) Oral every 6 hours  pantoprazole  Injectable 40 milliGRAM(s) IV Push daily  thiamine Injectable 100 milliGRAM(s) IV Push every 24 hours  ursodiol Suspension 300 milliGRAM(s) Oral every 8 hours  venlafaxine XR. 150 milliGRAM(s) Oral two times a day    REVIEW OF SYSTEMS:  All other review of systems is negative unless indicated above.    VITALS:  T(F): 97.7 (23 @ 13:00), Max: 97.7 (23 @ 06:08)  HR: 87 (23 @ 15:00)  BP: 128/61 (23 @ 15:00)  RR: 19 (23 @ 15:00)  SpO2: 99% (23 @ 15:00)  Wt(kg): --     @ 07:01  -   @ 07:00  --------------------------------------------------------  IN: 3585 mL / OUT: 4965 mL / NET: -1380 mL     @ 07:01  -   @ 07:00  --------------------------------------------------------  IN: 3790 mL / OUT: 4130 mL / NET: -340 mL     @ 07:01  -   @ 15:28  --------------------------------------------------------  IN: 1110 mL / OUT: 1475 mL / NET: -365 mL        PHYSICAL EXAM:  Constitutional: NAD, sitting in bedside chair  HEENT: NCAT, EOMI  Respiratory: CTAB, no crackles  Cardiovascular: regular rate  Gastrointestinal: abdominal wound noted with conrado pouch, PCT noted  Extremities: 1-2+ LE edema  Neurological: Awake, alert, moving all extremities    LABS:      134<L>  |  108  |  39<H>  ----------------------------<  115<H>  4.5   |  16<L>  |  2.78<H>    Ca    8.8      2023 04:18  Phos  4.4       Mg     1.7         TPro  8.5<H>  /  Alb  2.3<L>  /  TBili  8.0<H>  /  DBili  5.6<H>  /  AST  104<H>  /  ALT  77<H>  /  AlkPhos  262<H>                            9.4    10.19 )-----------( 215      ( 2023 04:18 )             29.8       Urine Studies:  Creatinine Trend: 2.78<--, 2.62<--, 2.58<--, 2.14<--, 2.00<--, 2.01<--  Urinalysis Basic - ( 2023 05:07 )    Color: Dark Yellow / Appearance: Cloudy / S.012 / pH:   Gluc:  / Ketone: Negative mg/dL  / Bili: Small / Urobili: 0.2 mg/dL   Blood:  / Protein: 30 mg/dL / Nitrite: Negative   Leuk Esterase: Negative / RBC: 4 /HPF / WBC 0 /HPF   Sq Epi:  / Non Sq Epi: 1 /HPF / Bacteria: Negative /HPF      Sodium, Random Urine: 118 mmol/L ( @ 05:07)  Creatinine, Random Urine: 34 mg/dL ( @ 05:07)  Protein/Creatinine Ratio Calculation: 0.8 Ratio ( @ 05:07)  Osmolality, Random Urine: 344 mosm/kg ( @ 05:07)  Potassium, Random Urine: 8 mmol/L ( @ 05:07)  Sodium, Random Urine: 48 mmol/L ( @ 08:26)  Creatinine, Random Urine: 54 mg/dL ( @ 08:26)  Protein/Creatinine Ratio Calculation: 2.3 Ratio ( 08:26)  Osmolality, Random Urine: 338 mosm/kg ( @ 08:26)  Potassium, Random Urine: 23 mmol/L ( @ 08:26)  Sodium, Random Urine: 60 mmol/L ( @ 22:07)  Creatinine, Random Urine: 55 mg/dL ( @ 22:07)  Protein/Creatinine Ratio Calculation: 2.1 Ratio ( 22:07)  Osmolality, Random Urine: 553 mosm/kg ( @ 22:07)  Potassium, Random Urine: 38 mmol/L ( @ 22:07)    RADIOLOGY & ADDITIONAL STUDIES:  Reviewed Nephrology progress note    Reconsulted for patient with recurrent ELVI. Patient previously at renal baseline creatinine 0.9-1.0, one week ago diagnosed with candidemia and started on caspofungin. Also noted to have hypotensive episode which resolved with fluid resuscitation and BP now stable. Additionally patient has significant GI output through conrado pouch, ileostomy, PCT (>2L/day). Urine output has been adequate 1-2+L per day. Overall patient has been net fluid positive since ELVI began developing. UA obtained with UPCR 0.8, Rebeca 118, 4 RBCs noted otherwise unremarkable. SPEP obtained by primary team with increased gamma globulin and M-spike, immunofixation with IgG lambda.    Allergies:  penicillin (Angioedema)    Hospital Medications:   MEDICATIONS  (STANDING):  albumin human 25% IVPB 50 milliLiter(s) IV Intermittent every 8 hours  caspofungin IVPB 50 milliGRAM(s) IV Intermittent every 24 hours  chlorhexidine 2% Cloths 1 Application(s) Topical <User Schedule>  cholestyramine Powder (Sugar-Free) 4 Gram(s) Oral daily  dextrose 10% + sodium chloride 0.9%. 1000 milliLiter(s) (100 mL/Hr) IV Continuous <Continuous>  epoetin matt-epbx (RETACRIT) Injectable 00376 Unit(s) SubCutaneous every 7 days  ergocalciferol 74963 Unit(s) Oral <User Schedule>  folic acid Injectable 1 milliGRAM(s) IV Push daily  glucagon  Injectable 1 milliGRAM(s) IntraMuscular once  heparin   Injectable 5000 Unit(s) SubCutaneous every 8 hours  hydrALAZINE Injectable 10 milliGRAM(s) IV Push every 8 hours  levothyroxine Injectable 30 MICROGram(s) IV Push at bedtime  metoprolol tartrate Injectable 5 milliGRAM(s) IV Push every 6 hours  octreotide  Injectable 100 MICROGram(s) IV Push three times a day  opium Tincture 6 milliGRAM(s) Oral every 6 hours  pantoprazole  Injectable 40 milliGRAM(s) IV Push daily  thiamine Injectable 100 milliGRAM(s) IV Push every 24 hours  ursodiol Suspension 300 milliGRAM(s) Oral every 8 hours  venlafaxine XR. 150 milliGRAM(s) Oral two times a day    REVIEW OF SYSTEMS:  All other review of systems is negative unless indicated above.    VITALS:  T(F): 97.7 (23 @ 13:00), Max: 97.7 (23 @ 06:08)  HR: 87 (23 @ 15:00)  BP: 128/61 (23 @ 15:00)  RR: 19 (23 @ 15:00)  SpO2: 99% (23 @ 15:00)  Wt(kg): --     @ 07:01  -   @ 07:00  --------------------------------------------------------  IN: 3585 mL / OUT: 4965 mL / NET: -1380 mL     @ 07:01  -   @ 07:00  --------------------------------------------------------  IN: 3790 mL / OUT: 4130 mL / NET: -340 mL     @ 07:01  -   @ 15:28  --------------------------------------------------------  IN: 1110 mL / OUT: 1475 mL / NET: -365 mL        PHYSICAL EXAM:  Constitutional: NAD, sitting in bedside chair  HEENT: NCAT, EOMI  Respiratory: CTAB, no crackles  Cardiovascular: regular rate  Gastrointestinal: abdominal wound noted with conrado pouch, PCT noted  Extremities: 1-2+ LE edema  Neurological: Awake, alert, moving all extremities    LABS:      134<L>  |  108  |  39<H>  ----------------------------<  115<H>  4.5   |  16<L>  |  2.78<H>    Ca    8.8      2023 04:18  Phos  4.4       Mg     1.7         TPro  8.5<H>  /  Alb  2.3<L>  /  TBili  8.0<H>  /  DBili  5.6<H>  /  AST  104<H>  /  ALT  77<H>  /  AlkPhos  262<H>                            9.4    10.19 )-----------( 215      ( 2023 04:18 )             29.8       Urine Studies:  Creatinine Trend: 2.78<--, 2.62<--, 2.58<--, 2.14<--, 2.00<--, 2.01<--  Urinalysis Basic - ( 2023 05:07 )    Color: Dark Yellow / Appearance: Cloudy / S.012 / pH:   Gluc:  / Ketone: Negative mg/dL  / Bili: Small / Urobili: 0.2 mg/dL   Blood:  / Protein: 30 mg/dL / Nitrite: Negative   Leuk Esterase: Negative / RBC: 4 /HPF / WBC 0 /HPF   Sq Epi:  / Non Sq Epi: 1 /HPF / Bacteria: Negative /HPF      Sodium, Random Urine: 118 mmol/L ( @ 05:07)  Creatinine, Random Urine: 34 mg/dL ( @ 05:07)  Protein/Creatinine Ratio Calculation: 0.8 Ratio ( @ 05:07)  Osmolality, Random Urine: 344 mosm/kg ( @ 05:07)  Potassium, Random Urine: 8 mmol/L ( @ 05:07)  Sodium, Random Urine: 48 mmol/L ( @ 08:26)  Creatinine, Random Urine: 54 mg/dL ( @ 08:26)  Protein/Creatinine Ratio Calculation: 2.3 Ratio ( 08:26)  Osmolality, Random Urine: 338 mosm/kg ( @ 08:26)  Potassium, Random Urine: 23 mmol/L ( @ 08:26)  Sodium, Random Urine: 60 mmol/L ( @ 22:07)  Creatinine, Random Urine: 55 mg/dL ( @ 22:07)  Protein/Creatinine Ratio Calculation: 2.1 Ratio ( 22:07)  Osmolality, Random Urine: 553 mosm/kg ( @ 22:07)  Potassium, Random Urine: 38 mmol/L ( @ 22:07)    RADIOLOGY & ADDITIONAL STUDIES:  Reviewed Nephrology progress note    Reconsulted for patient with recurrent ELVI. Patient previously at renal baseline creatinine 0.9-1.0, one week ago diagnosed with candidemia and started on caspofungin. Also noted to have hypotensive episode which resolved with fluid resuscitation and BP now stable. Additionally patient has significant GI output through conrado pouch, ileostomy, PCT (>2L/day). Urine output has been adequate 1-2+L per day. Overall patient has been net fluid positive since ELVI began developing. UA obtained with UPCR 0.8, Rebeca 118, 4 RBCs noted otherwise unremarkable. SPEP obtained by primary team with increased gamma globulin and M-spike, immunofixation with IgG lambda.    Allergies:  penicillin (Angioedema)    Hospital Medications:   MEDICATIONS  (STANDING):  albumin human 25% IVPB 50 milliLiter(s) IV Intermittent every 8 hours  caspofungin IVPB 50 milliGRAM(s) IV Intermittent every 24 hours  chlorhexidine 2% Cloths 1 Application(s) Topical <User Schedule>  cholestyramine Powder (Sugar-Free) 4 Gram(s) Oral daily  dextrose 10% + sodium chloride 0.9%. 1000 milliLiter(s) (100 mL/Hr) IV Continuous <Continuous>  epoetin matt-epbx (RETACRIT) Injectable 17410 Unit(s) SubCutaneous every 7 days  ergocalciferol 18992 Unit(s) Oral <User Schedule>  folic acid Injectable 1 milliGRAM(s) IV Push daily  glucagon  Injectable 1 milliGRAM(s) IntraMuscular once  heparin   Injectable 5000 Unit(s) SubCutaneous every 8 hours  hydrALAZINE Injectable 10 milliGRAM(s) IV Push every 8 hours  levothyroxine Injectable 30 MICROGram(s) IV Push at bedtime  metoprolol tartrate Injectable 5 milliGRAM(s) IV Push every 6 hours  octreotide  Injectable 100 MICROGram(s) IV Push three times a day  opium Tincture 6 milliGRAM(s) Oral every 6 hours  pantoprazole  Injectable 40 milliGRAM(s) IV Push daily  thiamine Injectable 100 milliGRAM(s) IV Push every 24 hours  ursodiol Suspension 300 milliGRAM(s) Oral every 8 hours  venlafaxine XR. 150 milliGRAM(s) Oral two times a day    REVIEW OF SYSTEMS:  All other review of systems is negative unless indicated above.    VITALS:  T(F): 97.7 (23 @ 13:00), Max: 97.7 (23 @ 06:08)  HR: 87 (23 @ 15:00)  BP: 128/61 (23 @ 15:00)  RR: 19 (23 @ 15:00)  SpO2: 99% (23 @ 15:00)  Wt(kg): --     @ 07:01  -   @ 07:00  --------------------------------------------------------  IN: 3585 mL / OUT: 4965 mL / NET: -1380 mL     @ 07:01  -   @ 07:00  --------------------------------------------------------  IN: 3790 mL / OUT: 4130 mL / NET: -340 mL     @ 07:01  -   @ 15:28  --------------------------------------------------------  IN: 1110 mL / OUT: 1475 mL / NET: -365 mL        PHYSICAL EXAM:  Constitutional: NAD, sitting in bedside chair  HEENT: NCAT, EOMI  Respiratory: CTAB, no crackles  Cardiovascular: regular rate  Gastrointestinal: abdominal wound noted with conrado pouch, PCT noted  Extremities: 1-2+ LE edema  Neurological: Awake, alert, moving all extremities    LABS:      134<L>  |  108  |  39<H>  ----------------------------<  115<H>  4.5   |  16<L>  |  2.78<H>    Ca    8.8      2023 04:18  Phos  4.4       Mg     1.7         TPro  8.5<H>  /  Alb  2.3<L>  /  TBili  8.0<H>  /  DBili  5.6<H>  /  AST  104<H>  /  ALT  77<H>  /  AlkPhos  262<H>                            9.4    10.19 )-----------( 215      ( 2023 04:18 )             29.8       Urine Studies:  Creatinine Trend: 2.78<--, 2.62<--, 2.58<--, 2.14<--, 2.00<--, 2.01<--  Urinalysis Basic - ( 2023 05:07 )    Color: Dark Yellow / Appearance: Cloudy / S.012 / pH:   Gluc:  / Ketone: Negative mg/dL  / Bili: Small / Urobili: 0.2 mg/dL   Blood:  / Protein: 30 mg/dL / Nitrite: Negative   Leuk Esterase: Negative / RBC: 4 /HPF / WBC 0 /HPF   Sq Epi:  / Non Sq Epi: 1 /HPF / Bacteria: Negative /HPF      Sodium, Random Urine: 118 mmol/L ( @ 05:07)  Creatinine, Random Urine: 34 mg/dL ( @ 05:07)  Protein/Creatinine Ratio Calculation: 0.8 Ratio ( @ 05:07)  Osmolality, Random Urine: 344 mosm/kg ( @ 05:07)  Potassium, Random Urine: 8 mmol/L ( @ 05:07)  Sodium, Random Urine: 48 mmol/L ( @ 08:26)  Creatinine, Random Urine: 54 mg/dL ( @ 08:26)  Protein/Creatinine Ratio Calculation: 2.3 Ratio ( 08:26)  Osmolality, Random Urine: 338 mosm/kg ( @ 08:26)  Potassium, Random Urine: 23 mmol/L ( @ 08:26)  Sodium, Random Urine: 60 mmol/L ( @ 22:07)  Creatinine, Random Urine: 55 mg/dL ( @ 22:07)  Protein/Creatinine Ratio Calculation: 2.1 Ratio ( 22:07)  Osmolality, Random Urine: 553 mosm/kg ( @ 22:07)  Potassium, Random Urine: 38 mmol/L ( @ 22:07)    RADIOLOGY & ADDITIONAL STUDIES:  Reviewed

## 2023-11-30 NOTE — PROGRESS NOTE ADULT - SUBJECTIVE AND OBJECTIVE BOX
SUBJECTIVE:   Patient seen and examined at bedside this AM   Ileostomy appliance changed overnight and midline pouch patched due to leaking   Patient concerned about recent lab trends, otherwise no complaints   Voiding spontaneously and adequately   Tolerating diet      MEDICATIONS  (STANDING):  amLODIPine   Tablet 10 milliGRAM(s) Oral every 24 hours  caspofungin IVPB 50 milliGRAM(s) IV Intermittent every 24 hours  chlorhexidine 2% Cloths 1 Application(s) Topical <User Schedule>  cholestyramine Powder (Sugar-Free) 4 Gram(s) Oral daily  dextrose 10% + sodium chloride 0.9%. 1000 milliLiter(s) (100 mL/Hr) IV Continuous <Continuous>  diphenoxylate/atropine 2 Tablet(s) Oral every 6 hours  epoetin matt-epbx (RETACRIT) Injectable 20196 Unit(s) SubCutaneous every 7 days  ergocalciferol 92668 Unit(s) Oral <User Schedule>  folic acid Injectable 1 milliGRAM(s) IV Push daily  glucagon  Injectable 1 milliGRAM(s) IntraMuscular once  heparin   Injectable 5000 Unit(s) SubCutaneous every 8 hours  influenza  Vaccine (HIGH DOSE) 0.7 milliLiter(s) IntraMuscular once  levothyroxine Injectable 40 MICROGram(s) IV Push at bedtime  metoprolol tartrate Injectable 5 milliGRAM(s) IV Push every 6 hours  octreotide  Injectable 100 MICROGram(s) IV Push three times a day  opium Tincture 6 milliGRAM(s) Oral every 6 hours  pantoprazole  Injectable 40 milliGRAM(s) IV Push daily  thiamine Injectable 100 milliGRAM(s) IV Push every 24 hours  ursodiol Suspension 300 milliGRAM(s) Oral every 8 hours  venlafaxine XR. 150 milliGRAM(s) Oral two times a day    MEDICATIONS  (PRN):  LORazepam   Injectable 0.5 milliGRAM(s) IV Push at bedtime PRN Anxiety  ondansetron Injectable 4 milliGRAM(s) IV Push every 8 hours PRN Nausea and/or Vomiting  simethicone 80 milliGRAM(s) Chew daily PRN Gas      Vital Signs Last 24 Hrs  T(C): 36.5 (30 Nov 2023 06:08), Max: 36.6 (29 Nov 2023 10:00)  T(F): 97.7 (30 Nov 2023 06:08), Max: 97.9 (29 Nov 2023 10:00)  HR: 86 (30 Nov 2023 05:00) (84 - 88)  BP: 196/84 (30 Nov 2023 05:00) (122/57 - 196/84)  BP(mean): 121 (30 Nov 2023 05:00) (82 - 121)  RR: 14 (30 Nov 2023 05:00) (12 - 25)  SpO2: 100% (30 Nov 2023 05:00) (91% - 100%)    Parameters below as of 30 Nov 2023 05:00  Patient On (Oxygen Delivery Method): room air      Physical Exam:  Neurological: AAOx3, CNII-XII intact,  strength 5/5 b/l  ENT: mucus membrane moist  Cardiovascular: RRR  Respiratory: CTA  Gastrointestinal: soft, NT, ND, right perc cony with dark bilious drainage. fistula with wound manager with grayish colored liquid and some formed content. wound bed pink, no bleeding. right sided ostomy with red rubber intact.   Extremities: warm, no dependent edema  Vascular: no cyanosis/erythema  Skin: no rashes  MSK: no joint swelling.       I&O's Summary    28 Nov 2023 07:01  -  29 Nov 2023 07:00  --------------------------------------------------------  IN: 3585 mL / OUT: 4965 mL / NET: -1380 mL    29 Nov 2023 07:01  -  30 Nov 2023 06:14  --------------------------------------------------------  IN: 3370 mL / OUT: 3180 mL / NET: 190 mL        LABS:                        9.4    10.19 )-----------( 215      ( 30 Nov 2023 04:18 )             29.8     11-30    134<L>  |  108  |  39<H>  ----------------------------<  115<H>  4.5   |  16<L>  |  2.78<H>    Ca    8.8      30 Nov 2023 04:18  Phos  4.4     11-30  Mg     1.7     11-30    TPro  8.5<H>  /  Alb  2.3<L>  /  TBili  8.0<H>  /  DBili  5.6<H>  /  AST  104<H>  /  ALT  77<H>  /  AlkPhos  262<H>  11-30    PT/INR - ( 30 Nov 2023 04:18 )   PT: 14.0 sec;   INR: 1.24          PTT - ( 30 Nov 2023 04:18 )  PTT:45.4 sec  Urinalysis Basic - ( 30 Nov 2023 04:18 )    Color: x / Appearance: x / SG: x / pH: x  Gluc: 115 mg/dL / Ketone: x  / Bili: x / Urobili: x   Blood: x / Protein: x / Nitrite: x   Leuk Esterase: x / RBC: x / WBC x   Sq Epi: x / Non Sq Epi: x / Bacteria: x      CAPILLARY BLOOD GLUCOSE      POCT Blood Glucose.: 95 mg/dL (29 Nov 2023 11:23)    LIVER FUNCTIONS - ( 30 Nov 2023 04:18 )  Alb: 2.3 g/dL / Pro: 8.5 g/dL / ALK PHOS: 262 U/L / ALT: 77 U/L / AST: 104 U/L / GGT: x             RADIOLOGY & ADDITIONAL STUDIES:   SUBJECTIVE:   Patient seen and examined at bedside this AM   Ileostomy appliance changed overnight and midline pouch patched due to leaking   Patient concerned about recent lab trends, otherwise no complaints   Voiding spontaneously and adequately   Tolerating diet      MEDICATIONS  (STANDING):  amLODIPine   Tablet 10 milliGRAM(s) Oral every 24 hours  caspofungin IVPB 50 milliGRAM(s) IV Intermittent every 24 hours  chlorhexidine 2% Cloths 1 Application(s) Topical <User Schedule>  cholestyramine Powder (Sugar-Free) 4 Gram(s) Oral daily  dextrose 10% + sodium chloride 0.9%. 1000 milliLiter(s) (100 mL/Hr) IV Continuous <Continuous>  diphenoxylate/atropine 2 Tablet(s) Oral every 6 hours  epoetin matt-epbx (RETACRIT) Injectable 96574 Unit(s) SubCutaneous every 7 days  ergocalciferol 68732 Unit(s) Oral <User Schedule>  folic acid Injectable 1 milliGRAM(s) IV Push daily  glucagon  Injectable 1 milliGRAM(s) IntraMuscular once  heparin   Injectable 5000 Unit(s) SubCutaneous every 8 hours  influenza  Vaccine (HIGH DOSE) 0.7 milliLiter(s) IntraMuscular once  levothyroxine Injectable 40 MICROGram(s) IV Push at bedtime  metoprolol tartrate Injectable 5 milliGRAM(s) IV Push every 6 hours  octreotide  Injectable 100 MICROGram(s) IV Push three times a day  opium Tincture 6 milliGRAM(s) Oral every 6 hours  pantoprazole  Injectable 40 milliGRAM(s) IV Push daily  thiamine Injectable 100 milliGRAM(s) IV Push every 24 hours  ursodiol Suspension 300 milliGRAM(s) Oral every 8 hours  venlafaxine XR. 150 milliGRAM(s) Oral two times a day    MEDICATIONS  (PRN):  LORazepam   Injectable 0.5 milliGRAM(s) IV Push at bedtime PRN Anxiety  ondansetron Injectable 4 milliGRAM(s) IV Push every 8 hours PRN Nausea and/or Vomiting  simethicone 80 milliGRAM(s) Chew daily PRN Gas      Vital Signs Last 24 Hrs  T(C): 36.5 (30 Nov 2023 06:08), Max: 36.6 (29 Nov 2023 10:00)  T(F): 97.7 (30 Nov 2023 06:08), Max: 97.9 (29 Nov 2023 10:00)  HR: 86 (30 Nov 2023 05:00) (84 - 88)  BP: 196/84 (30 Nov 2023 05:00) (122/57 - 196/84)  BP(mean): 121 (30 Nov 2023 05:00) (82 - 121)  RR: 14 (30 Nov 2023 05:00) (12 - 25)  SpO2: 100% (30 Nov 2023 05:00) (91% - 100%)    Parameters below as of 30 Nov 2023 05:00  Patient On (Oxygen Delivery Method): room air      Physical Exam:  Neurological: AAOx3, CNII-XII intact,  strength 5/5 b/l  ENT: mucus membrane moist  Cardiovascular: RRR  Respiratory: CTA  Gastrointestinal: soft, NT, ND, right perc cony with dark bilious drainage. fistula with wound manager with grayish colored liquid and some formed content. wound bed pink, no bleeding. right sided ostomy with red rubber intact.   Extremities: warm, no dependent edema  Vascular: no cyanosis/erythema  Skin: no rashes  MSK: no joint swelling.       I&O's Summary    28 Nov 2023 07:01  -  29 Nov 2023 07:00  --------------------------------------------------------  IN: 3585 mL / OUT: 4965 mL / NET: -1380 mL    29 Nov 2023 07:01  -  30 Nov 2023 06:14  --------------------------------------------------------  IN: 3370 mL / OUT: 3180 mL / NET: 190 mL        LABS:                        9.4    10.19 )-----------( 215      ( 30 Nov 2023 04:18 )             29.8     11-30    134<L>  |  108  |  39<H>  ----------------------------<  115<H>  4.5   |  16<L>  |  2.78<H>    Ca    8.8      30 Nov 2023 04:18  Phos  4.4     11-30  Mg     1.7     11-30    TPro  8.5<H>  /  Alb  2.3<L>  /  TBili  8.0<H>  /  DBili  5.6<H>  /  AST  104<H>  /  ALT  77<H>  /  AlkPhos  262<H>  11-30    PT/INR - ( 30 Nov 2023 04:18 )   PT: 14.0 sec;   INR: 1.24          PTT - ( 30 Nov 2023 04:18 )  PTT:45.4 sec  Urinalysis Basic - ( 30 Nov 2023 04:18 )    Color: x / Appearance: x / SG: x / pH: x  Gluc: 115 mg/dL / Ketone: x  / Bili: x / Urobili: x   Blood: x / Protein: x / Nitrite: x   Leuk Esterase: x / RBC: x / WBC x   Sq Epi: x / Non Sq Epi: x / Bacteria: x      CAPILLARY BLOOD GLUCOSE      POCT Blood Glucose.: 95 mg/dL (29 Nov 2023 11:23)    LIVER FUNCTIONS - ( 30 Nov 2023 04:18 )  Alb: 2.3 g/dL / Pro: 8.5 g/dL / ALK PHOS: 262 U/L / ALT: 77 U/L / AST: 104 U/L / GGT: x             RADIOLOGY & ADDITIONAL STUDIES:   SUBJECTIVE:   Patient seen and examined at bedside this AM   Ileostomy appliance changed overnight and midline pouch patched due to leaking   Patient concerned about recent lab trends, otherwise no complaints   Voiding spontaneously and adequately   Tolerating diet      MEDICATIONS  (STANDING):  amLODIPine   Tablet 10 milliGRAM(s) Oral every 24 hours  caspofungin IVPB 50 milliGRAM(s) IV Intermittent every 24 hours  chlorhexidine 2% Cloths 1 Application(s) Topical <User Schedule>  cholestyramine Powder (Sugar-Free) 4 Gram(s) Oral daily  dextrose 10% + sodium chloride 0.9%. 1000 milliLiter(s) (100 mL/Hr) IV Continuous <Continuous>  diphenoxylate/atropine 2 Tablet(s) Oral every 6 hours  epoetin matt-epbx (RETACRIT) Injectable 04125 Unit(s) SubCutaneous every 7 days  ergocalciferol 86522 Unit(s) Oral <User Schedule>  folic acid Injectable 1 milliGRAM(s) IV Push daily  glucagon  Injectable 1 milliGRAM(s) IntraMuscular once  heparin   Injectable 5000 Unit(s) SubCutaneous every 8 hours  influenza  Vaccine (HIGH DOSE) 0.7 milliLiter(s) IntraMuscular once  levothyroxine Injectable 40 MICROGram(s) IV Push at bedtime  metoprolol tartrate Injectable 5 milliGRAM(s) IV Push every 6 hours  octreotide  Injectable 100 MICROGram(s) IV Push three times a day  opium Tincture 6 milliGRAM(s) Oral every 6 hours  pantoprazole  Injectable 40 milliGRAM(s) IV Push daily  thiamine Injectable 100 milliGRAM(s) IV Push every 24 hours  ursodiol Suspension 300 milliGRAM(s) Oral every 8 hours  venlafaxine XR. 150 milliGRAM(s) Oral two times a day    MEDICATIONS  (PRN):  LORazepam   Injectable 0.5 milliGRAM(s) IV Push at bedtime PRN Anxiety  ondansetron Injectable 4 milliGRAM(s) IV Push every 8 hours PRN Nausea and/or Vomiting  simethicone 80 milliGRAM(s) Chew daily PRN Gas      Vital Signs Last 24 Hrs  T(C): 36.5 (30 Nov 2023 06:08), Max: 36.6 (29 Nov 2023 10:00)  T(F): 97.7 (30 Nov 2023 06:08), Max: 97.9 (29 Nov 2023 10:00)  HR: 86 (30 Nov 2023 05:00) (84 - 88)  BP: 196/84 (30 Nov 2023 05:00) (122/57 - 196/84)  BP(mean): 121 (30 Nov 2023 05:00) (82 - 121)  RR: 14 (30 Nov 2023 05:00) (12 - 25)  SpO2: 100% (30 Nov 2023 05:00) (91% - 100%)    Parameters below as of 30 Nov 2023 05:00  Patient On (Oxygen Delivery Method): room air      Physical Exam:  Neurological: AAOx3, CNII-XII intact,  strength 5/5 b/l  ENT: mucus membrane moist  Cardiovascular: RRR  Respiratory: CTA  Gastrointestinal: soft, NT, ND, right perc cony with dark bilious drainage. fistula with wound manager with grayish colored liquid and some formed content. wound bed pink, no bleeding. right sided ostomy with red rubber intact.   Extremities: warm, no dependent edema  Vascular: no cyanosis/erythema  Skin: no rashes  MSK: no joint swelling.       I&O's Summary    28 Nov 2023 07:01  -  29 Nov 2023 07:00  --------------------------------------------------------  IN: 3585 mL / OUT: 4965 mL / NET: -1380 mL    29 Nov 2023 07:01  -  30 Nov 2023 06:14  --------------------------------------------------------  IN: 3370 mL / OUT: 3180 mL / NET: 190 mL        LABS:                        9.4    10.19 )-----------( 215      ( 30 Nov 2023 04:18 )             29.8     11-30    134<L>  |  108  |  39<H>  ----------------------------<  115<H>  4.5   |  16<L>  |  2.78<H>    Ca    8.8      30 Nov 2023 04:18  Phos  4.4     11-30  Mg     1.7     11-30    TPro  8.5<H>  /  Alb  2.3<L>  /  TBili  8.0<H>  /  DBili  5.6<H>  /  AST  104<H>  /  ALT  77<H>  /  AlkPhos  262<H>  11-30    PT/INR - ( 30 Nov 2023 04:18 )   PT: 14.0 sec;   INR: 1.24          PTT - ( 30 Nov 2023 04:18 )  PTT:45.4 sec  Urinalysis Basic - ( 30 Nov 2023 04:18 )    Color: x / Appearance: x / SG: x / pH: x  Gluc: 115 mg/dL / Ketone: x  / Bili: x / Urobili: x   Blood: x / Protein: x / Nitrite: x   Leuk Esterase: x / RBC: x / WBC x   Sq Epi: x / Non Sq Epi: x / Bacteria: x      CAPILLARY BLOOD GLUCOSE      POCT Blood Glucose.: 95 mg/dL (29 Nov 2023 11:23)    LIVER FUNCTIONS - ( 30 Nov 2023 04:18 )  Alb: 2.3 g/dL / Pro: 8.5 g/dL / ALK PHOS: 262 U/L / ALT: 77 U/L / AST: 104 U/L / GGT: x             RADIOLOGY & ADDITIONAL STUDIES:   no

## 2023-11-30 NOTE — PROGRESS NOTE ADULT - ASSESSMENT
78M w PMH Crohn's, AFib/Flutter s/p DCCVs on amiodarone, remote ileocectomy and open appendectomy. Admitted (6/23) for SBO vs Crohns flare, s/p NGT decompression and s/p lap converted to open TRE, SBR x 3, left in discontinuity with abthera vac on (6/27), RTOR for ileocolic resection, small bowel anastomosis, and abdominal wall closure on (6/28), c/b fluid collection s/p IR aspiration of perihepatic fluid on (7/3), c/b wound dehiscence s/p RTOR exlap, washout, ileocolic resection with end ileostomy, blow hole colostomy, red rubber from ileostomy to small bowel anastomosis; vicryl bridging mesh on (7/5) transferred to SICU postoperatively for hemodynamic monitoring, with hospital course complicated by periods of severe ELVI and hyponatremia, which resolved but stepped back up for to SICU on (9/10) for acute AMS, intermittent hypoglycemia, A-fib with RVR. Percutaneous Cholecystostomy placed on (9/11) for enlarged GB, PCT capped 10/5 and uncapped 10/25 for hyperbilirubinemia, Right brachial DVT, left basillic and cephalic superficial thrombus on duplex 11/2, CT 11/14 with ALDEN ground glass opacity of unclear etiology, completed empiric 7 day cefepime course 11/22, rising T-bili of unclear etiology, now sepsis w/ fungemia (mehran glabrata), likely CLABSI.    Worsening direct hyperbilirubinemia and transaminitis - c/f obstructive biliary disease. HPB anatomy not properly visualized on last RUQ ultrasound 11/25 due to medical condition   Up-trending Cr - c/f ELVI     Plan:   - Continue Caspo for fungemia per ID   - Replace PICC for TPN as soon as able   - Consider urine lytes d/t uptrending Cr   - IVF (D10 NS)   - Trend Cr and monitor UOP   - Appreciate hepatology input on worsening direct hyperbilirubinemia   - Encourage OOB   - Appreciate SICU care

## 2023-11-30 NOTE — PROGRESS NOTE ADULT - SUBJECTIVE AND OBJECTIVE BOX
INTERVAL/OVERNIGHT EVENTS: NAEO. Vac replaced and new PIV placed.     SUBJECTIVE: This AM is sitting in chair and states he feels nauseous. No emesis or abd pain. Voids without issues. No CP/SOB. Ileostomy/ECF working.    Neurologic Medications  LORazepam   Injectable 0.5 milliGRAM(s) IV Push at bedtime PRN Anxiety  ondansetron Injectable 4 milliGRAM(s) IV Push every 8 hours PRN Nausea and/or Vomiting  opium Tincture 6 milliGRAM(s) Oral every 6 hours  venlafaxine XR. 150 milliGRAM(s) Oral two times a day    Cardiovascular Medications  hydrALAZINE Injectable 10 milliGRAM(s) IV Push every 8 hours  metoprolol tartrate Injectable 5 milliGRAM(s) IV Push every 6 hours    Gastrointestinal Medications  albumin human 25% IVPB 50 milliLiter(s) IV Intermittent every 8 hours  dextrose 10% + sodium chloride 0.9%. 1000 milliLiter(s) IV Continuous <Continuous>  ergocalciferol 20440 Unit(s) Oral <User Schedule>  folic acid Injectable 1 milliGRAM(s) IV Push daily  pantoprazole  Injectable 40 milliGRAM(s) IV Push daily  thiamine Injectable 100 milliGRAM(s) IV Push every 24 hours  ursodiol Suspension 300 milliGRAM(s) Oral every 8 hours    Hematologic/Oncologic Medications  epoetin matt-epbx (RETACRIT) Injectable 70499 Unit(s) SubCutaneous every 7 days  heparin   Injectable 5000 Unit(s) SubCutaneous every 8 hours    Antimicrobial/Immunologic Medications  caspofungin IVPB 50 milliGRAM(s) IV Intermittent every 24 hours    Endocrine/Metabolic Medications  cholestyramine Powder (Sugar-Free) 4 Gram(s) Oral daily  glucagon  Injectable 1 milliGRAM(s) IntraMuscular once  levothyroxine Injectable 30 MICROGram(s) IV Push at bedtime  octreotide  Injectable 100 MICROGram(s) IV Push three times a day    Topical/Other Medications  chlorhexidine 2% Cloths 1 Application(s) Topical <User Schedule>    MEDICATIONS  (PRN):  LORazepam   Injectable 0.5 milliGRAM(s) IV Push at bedtime PRN Anxiety  ondansetron Injectable 4 milliGRAM(s) IV Push every 8 hours PRN Nausea and/or Vomiting    I&O's Detail    2023 07:01  -  2023 07:00  --------------------------------------------------------  IN:    dextrose 10% + sodium chloride 0.9%: 1800 mL    IV PiggyBack: 250 mL    Oral Fluid: 240 mL    Sodium Chloride 0.9% Bolus: 1500 mL  Total IN: 3790 mL    OUT:    Drain (mL): 1645 mL    Drain (mL): 550 mL    Drain (mL): 10 mL    Voided (mL): 1925 mL  Total OUT: 4130 mL    Total NET: -340 mL    2023 07:01  -  2023 13:28  --------------------------------------------------------  IN:    dextrose 10% + sodium chloride 0.9%: 500 mL    IV PiggyBack: 25 mL    Oral Fluid: 240 mL  Total IN: 765 mL    OUT:    Drain (mL): 400 mL    Voided (mL): 400 mL  Total OUT: 800 mL    Total NET: -35 mL    Vital Signs Last 24 Hrs  T(C): 36.5 (2023 13:00), Max: 36.5 (2023 06:08)  T(F): 97.7 (:00), Max: 97.7 (2023 06:08)  HR: 88 (:00) (84 - 88)  BP: 143/68 (:00) (122/57 - 196/84)  BP(mean): 98 (:00) (82 - 121)  RR: 21 (:00) (12 - 25)  SpO2: 99% (:00) (92% - 100%)    Parameters below as of :00  Patient On (Oxygen Delivery Method): room air    GENERAL: NAD, resting comfortably in chair, jaundiced, follows commands, AxOx3  HEENT: NCAT, MMM  C/V: Normal rate, normal peripheral perfusion, no murmurs  PULM: Nonlabored breathing, no respiratory distress, CTA b/l  ABD: Soft, ND, NT, no rebound tenderness, no guarding, abd soft, PCT bilious output, ECF/ileostomy w wound manager in place w thick output.  EXTREM: WWP, edema on legs, SCDs in place  NEURO: No focal deficits    LABS:                        9.4    10.19 )-----------( 215      ( 2023 04:18 )             29.8         134<L>  |  108  |  39<H>  ----------------------------<  115<H>  4.5   |  16<L>  |  2.78<H>    Ca    8.8      2023 04:18  Phos  4.4       Mg     1.7         TPro  8.5<H>  /  Alb  2.3<L>  /  TBili  8.0<H>  /  DBili  5.6<H>  /  AST  104<H>  /  ALT  77<H>  /  AlkPhos  262<H>      PT/INR - ( 2023 04:18 )   PT: 14.0 sec;   INR: 1.24       PTT - ( 2023 04:18 )  PTT:45.4 sec  Urinalysis Basic - ( 2023 05:07 )    Color: Dark Yellow / Appearance: Cloudy / S.012 / pH: x  Gluc: x / Ketone: Negative mg/dL  / Bili: Small / Urobili: 0.2 mg/dL   Blood: x / Protein: 30 mg/dL / Nitrite: Negative   Leuk Esterase: Negative / RBC: 4 /HPF / WBC 0 /HPF   Sq Epi: x / Non Sq Epi: 1 /HPF / Bacteria: Negative /HPF    RADIOLOGY & ADDITIONAL STUDIES    Culture - Blood (collected 23 @ 11:30)  Source: .Blood Blood-Peripheral  Preliminary Report (23 @ 13:01):    No growth at 3 days.    Culture - Blood (collected 23 @ 11:30)  Source: .Blood Blood-Peripheral  Preliminary Report (23 @ 13:00):    No growth at 3 days.    Culture - Blood (collected 23 @ 17:37)  Source: .Blood Blood  Gram Stain (23 @ 21:38):    Anaerobic Bottle: Gram Positive Rods    Result called to and read back byChico Orlando RN 2023 21:38:02    ***Blood Panel PCR results on this specimen are available    approximately 3 hours after the Gram stain result.***    Gram stain, PCR, and/or culture results may not always    correspond due to difference in methodologies.    ************************************************************    This PCR assay was performed using [employer] panel.    This assay tests for bacterial, fungal and resistance gene    targets.  Preliminary Report (23 @ 12:43):    Growth in anaerobic bottle: Lactobacillus rhamnosus    Aerobic Bottle: No growth to date.    Change in culture status will be immediately reported.  Organism: Blood Culture PCR (23 @ 23:00)  Organism: Blood Culture PCR (23 @ 23:00)      Method Type: PCR      -  Acinetobacter baumanii: Nondet      -  Bacteroides fragilis: Nondet      -  Enterobacter cloacae complex: Nondet      -  Escherichia coli: Nondet      -  Enterobacterales: Nondet      -  Haemophilus influenzae: Nondet      -  Klebsiella aerogenes: Nondet      -  Klebsiella oxytoca: Nondet      -  K. pneumoniae group: Nondet      -  Neisseria meningitidis: Nondet      -  Proteus species: Nondet      -  Pseudomonas aeruginosa: Nondet      -  Salmonella species: Nondet      -  Serratia marcescens: Nondet      -  Stenotrophomonas maltophilia: Nondet      -  Enterococcus faecalis: Nondet      -  Enterococcus faecalis,VRE: Nondet      -  Enterococcus faecium: Nondet      -  Enterococcus faecium,VRE: Nondet      -  Listeria monocytogenes: Nondet      -  Coagulase negative Staphylococcus: Nondet      -  Coagulase negative Staphylococcus, Methicillin resistant: Nondet      -  Methicillin SENSITIVE Staphylococcus aureus (MSSA): Nondet      -  Methicillin resistant Staphylococcus aureus (MRSA): Nondet      -  Staphylococcus epidermidis: Nondet      -  Staphylococcus epidermidis, Methicillin resistant: Nondet      -  Staphylococcus lugdunensis: Nondet      -  Staphylococcus lugdunensis, Methicillin resistant: Nondet      -  Streptococcus sp. (Not Grp A, B or S pneumoniae): Nondet      -  Streptococcus agalactiae (Group B): Nondet      -  Streptococcus pneumoniae: Nondet      -  Streptococcus pyogenes (Group A): Nondet      -  Candida albicans: Nondet      -  Candida auris: Nondet      -  Candida glabrata: Nondet      -  Candida krusei: Nondet      -  Candida parapsilosis: Nondet      -  Candida tropicalis: Nondet      -  Cryptococcus neoformans: Nondet    Culture - Blood (collected 23 @ 17:37)  Source: .Blood Blood  Preliminary Report (23 @ 18:00):    No growth at 3 days.

## 2023-11-30 NOTE — PROGRESS NOTE ADULT - ASSESSMENT
77 yo M with prolonged hospital course, admited for SBO and CD flare s/p multiple GI surgeries with wound dehiscense, ileostomy EC fistula, PCT placement with worsening LFTs, recurrent ELVI in the setting of candidemia. Nephrology reconsulted for ELVI.    ELVI, suspect hemodynamic/septic ATN, cannot rule out drug-induced AIN given PPI use, possible GN with recurrent hematuria, paraprotein-related renal disease  - Cr rising, 2.8 from baseline 0.9 over one week. UOP adequate. Rebeca 118. UPCR 0.8.  - Abnormal spep and immunofixation - m-spike and gamma globulin  - Check serum free light chains and ratio, urine electrophoresis, and urine immunofixation  - Check GN workup - ELEUTERIO, anti-dsDNA ANCA, mpo, pr3, complement C3/C4, anti-glomerular baseline membrane, HBV, HCV, HIV  - Check renal US to assess for hydronephrosis  - Continue IV fluids as tolerated, monitor volume and respiratory status closely  - Continue to trend creatinine  - Maintain MAP >65 for renal perfusion  - Avoid nephrotoxic meds, IV contrast for now  - Strict I&O, daily weights  - Would recommend Hematology evaluation with abnormal SPEP and pending workup above 79 yo M with prolonged hospital course, admited for SBO and CD flare s/p multiple GI surgeries with wound dehiscense, ileostomy EC fistula, PCT placement with worsening LFTs, recurrent ELVI in the setting of candidemia. Nephrology reconsulted for ELVI.    ELVI, suspect hemodynamic/septic ATN, cannot rule out drug-induced AIN given PPI use, possible GN with recurrent hematuria, paraprotein-related renal disease  - Cr rising, 2.8 from baseline 0.9 over one week. UOP adequate. Rebeca 118. UPCR 0.8.  - Abnormal spep and immunofixation - m-spike and gamma globulin  - Check serum free light chains and ratio, urine electrophoresis, and urine immunofixation  - Check GN workup - ELEUTERIO, anti-dsDNA ANCA, mpo, pr3, complement C3/C4, anti-glomerular baseline membrane, HBV, HCV, HIV  - Check renal US to assess for hydronephrosis  - Continue IV fluids as tolerated, monitor volume and respiratory status closely  - Continue to trend creatinine  - Maintain MAP >65 for renal perfusion  - Avoid nephrotoxic meds, IV contrast for now  - Strict I&O, daily weights  - Would recommend Hematology evaluation with abnormal SPEP and pending workup above 79 yo M with prolonged hospital course, admited for SBO and CD flare s/p multiple GI surgeries with wound dehiscense, ileostomy EC fistula, PCT placement with worsening LFTs, recurrent ELVI in the setting of candidemia. Nephrology reconsulted for ELVI.    ELVI, suspect hemodynamic/septic ATN, cannot rule out drug-induced AIN given PPI use, possible GN with recurrent hematuria, paraprotein-related renal disease  - Cr rising, 2.8 from baseline 0.9 over one week. UOP adequate. Rebeca 118. UPCR 0.8.  - Abnormal spep and immunofixation - m-spike and gamma globulin  - Check serum free light chains and ratio, urine electrophoresis, and urine immunofixation  - Check GN workup - ELEUTERIO, anti-dsDNA, ANCA, mpo, pr3, complement C3/C4, anti-glomerular baseline membrane, HBV, HCV, HIV  - Check renal US to assess for hydronephrosis  - Continue IV fluids as tolerated, monitor volume and respiratory status closely  - Continue to trend creatinine  - Maintain MAP >65 for renal perfusion  - Avoid nephrotoxic meds, IV contrast for now  - Strict I&O, daily weights  - Would recommend Hematology evaluation with abnormal SPEP and pending workup above 77 yo M with prolonged hospital course, admited for SBO and CD flare s/p multiple GI surgeries with wound dehiscense, ileostomy EC fistula, PCT placement with worsening LFTs, recurrent ELVI in the setting of candidemia. Nephrology reconsulted for ELVI.    ELVI, suspect hemodynamic/septic ATN, cannot rule out drug-induced AIN given PPI use, possible GN with recurrent hematuria, paraprotein-related renal disease  - Cr rising, 2.8 from baseline 0.9 over one week. UOP adequate. Rebeca 118. UPCR 0.8.  - Abnormal spep and immunofixation - m-spike and gamma globulin  - Check serum free light chains and ratio, urine electrophoresis, and urine immunofixation  - Check GN workup - ELEUTERIO, anti-dsDNA, ANCA, mpo, pr3, complement C3/C4, anti-glomerular baseline membrane, HBV, HCV, HIV  - Check renal US to assess for hydronephrosis  - Continue IV fluids as tolerated, monitor volume and respiratory status closely  - Continue to trend creatinine  - Maintain MAP >65 for renal perfusion  - Avoid nephrotoxic meds, IV contrast for now  - Strict I&O, daily weights  - Would recommend Hematology evaluation with abnormal SPEP and pending workup above

## 2023-12-01 NOTE — PROGRESS NOTE ADULT - SUBJECTIVE AND OBJECTIVE BOX
ON: 1L LR to keep net even. No acute events   11/30: Hydral 10 added, UA spec grav normal, FeUrea intrinsic, CK 27, RP US _____, MARLO Duplex___. Portal Duplex___. Gen Sgy consulted. Renal recs GN work up (ordered) ELVI not Caspo related. ID - caspo likely not cause of ELVI, dec synthroid d/t elev T4, dec TSH, dc'd lomotil--ostomy appears thicker today; BCx 11/26 - lactobacillus, probiotic??.       SUBJECTIVE: Patient seen and examined bedside by Surgical resident. Seen sitting up in bed this morning. States that he is feeling OK this am. still appears visibly jaundice.     caspofungin IVPB 50 milliGRAM(s) IV Intermittent every 24 hours  heparin   Injectable 5000 Unit(s) SubCutaneous every 8 hours  hydrALAZINE Injectable 10 milliGRAM(s) IV Push every 8 hours  metoprolol tartrate Injectable 5 milliGRAM(s) IV Push every 6 hours    MEDICATIONS  (PRN):  LORazepam   Injectable 0.5 milliGRAM(s) IV Push at bedtime PRN Anxiety  ondansetron Injectable 4 milliGRAM(s) IV Push every 8 hours PRN Nausea and/or Vomiting      I&O's Detail    30 Nov 2023 07:01  -  01 Dec 2023 07:00  --------------------------------------------------------  IN:    Albumin 25%  -  50 mL: 150 mL    dextrose 10% + sodium chloride 0.9%: 2400 mL    IV PiggyBack: 50 mL    IV PiggyBack: 250 mL    Lactated Ringers Bolus: 1000 mL    Oral Fluid: 610 mL  Total IN: 4460 mL    OUT:    Drain (mL): 30 mL    Drain (mL): 550 mL    Drain (mL): 2400 mL    Voided (mL): 1595 mL  Total OUT: 4575 mL    Total NET: -115 mL      01 Dec 2023 07:01  -  01 Dec 2023 07:59  --------------------------------------------------------  IN:    dextrose 10% + sodium chloride 0.9%: 100 mL  Total IN: 100 mL    OUT:  Total OUT: 0 mL    Total NET: 100 mL          Vital Signs Last 24 Hrs  T(C): 36.5 (01 Dec 2023 05:15), Max: 36.6 (01 Dec 2023 01:15)  T(F): 97.7 (01 Dec 2023 05:15), Max: 97.9 (01 Dec 2023 01:15)  HR: 89 (01 Dec 2023 07:00) (84 - 89)  BP: 142/66 (01 Dec 2023 07:00) (121/60 - 179/76)  BP(mean): 95 (01 Dec 2023 07:00) (86 - 109)  RR: 19 (01 Dec 2023 07:00) (13 - 22)  SpO2: 95% (01 Dec 2023 07:00) (93% - 99%)    Parameters below as of 01 Dec 2023 08:00  Patient On (Oxygen Delivery Method): room air        General: NAD, resting comfortably in bed  C/V: NSR  Pulm: Nonlabored breathing, no respiratory distress  Abd: soft, NT/ND  Extrem: WWP, no edema, SCDs in place  LINES: PIVs // DC: LUE PICC (9/1-11/1 ), RUE PICC: (11/1-11/24)   WOUNDS/DRAINS: Abdominal wound and fistula with wound manager in place dressing change Tuesday and Friday. RLQ Ostomy. IR perc cony tube. // DC: L Clay drain      LABS:                        8.5    9.98  )-----------( 211      ( 01 Dec 2023 04:29 )             26.3     12-01    136  |  110<H>  |  33<H>  ----------------------------<  122<H>  4.3   |  14<L>  |  2.89<H>    Ca    9.0      01 Dec 2023 04:29  Phos  3.8     12-01  Mg     2.1     12-01    TPro  8.3  /  Alb  2.5<L>  /  TBili  7.6<H>  /  DBili  5.1<H>  /  AST  96<H>  /  ALT  71<H>  /  AlkPhos  234<H>  12-01    PT/INR - ( 01 Dec 2023 04:29 )   PT: 13.9 sec;   INR: 1.23          PTT - ( 01 Dec 2023 04:29 )  PTT:38.4 sec  Urinalysis Basic - ( 01 Dec 2023 04:29 )    Color: x / Appearance: x / SG: x / pH: x  Gluc: 122 mg/dL / Ketone: x  / Bili: x / Urobili: x   Blood: x / Protein: x / Nitrite: x   Leuk Esterase: x / RBC: x / WBC x   Sq Epi: x / Non Sq Epi: x / Bacteria: x        RADIOLOGY & ADDITIONAL STUDIES:

## 2023-12-01 NOTE — PROGRESS NOTE ADULT - ASSESSMENT
78M w/ hx Crohn’s disease and prolonged hospitalization since 6/23, admitted for SBO s/p open TRE, SBR x 3, left in discontinuity with abthera, then RTOR for ileocolic resection and small bowel anastamosis with abdominal closure (6/28) c/b fluid collection/feculent peritonitis s/p IR aspiration (7/3) and then exlap, washout, ileocolic resection with end ileostomy, blow hole colostomy, red rubber from ileostomy to small bowel anastomosis, and vicryl bridging mesh (7/5) with abdominal cx at that time w/ VRE.casseliflavus, VRE.gallinarum E.faecium, Clostridium tertium, L.rhamnosus, C.albicans, then possible acalculous cholecystitis s/p perc cony (9/11) with negative bile fluid cx, then had CT with [facility] for which received 7 days cefepime EOT 11/22, but then developed fevers up to 105 on 11/23, associated with hypotension, and rising leukocytosis to 12k, UA neg, BCx x2 with C.glabrata. RUE PICC line removed 11/24 (had been receiving TPN through this line).     # C.glabrata CLABSI (from RUE PICC for TPN)  - S/p removal of PICC 11/24. Surveillance BCx from shortly after PICC removal on 11/24 also positive, but second BCx from 11/24 and BCx from 11/25, 11/26, and 11/27 ngtd  - If EKG today w/ QTc <480, can transition to PO Fluconazole 600mg qd if able/taking PO, otherwise can also do IV Fluconazole 600mg qd, for total 2 week course through 12/08  - If EKG w/ QTc >480, continue IV caspofungin 50mg daily through 12/08  - recommend 72 hours of negative blood cultures before new central line placement  - TTE neg 11/24 and ophtho exam negative 11/24    ID Team 1 will sign off. Please re-consult if new questions arise.  78M w/ hx Crohn’s disease and prolonged hospitalization since 6/23, admitted for SBO s/p open TRE, SBR x 3, left in discontinuity with abthera, then RTOR for ileocolic resection and small bowel anastamosis with abdominal closure (6/28) c/b fluid collection/feculent peritonitis s/p IR aspiration (7/3) and then exlap, washout, ileocolic resection with end ileostomy, blow hole colostomy, red rubber from ileostomy to small bowel anastomosis, and vicryl bridging mesh (7/5) with abdominal cx at that time w/ VRE.casseliflavus, VRE.gallinarum E.faecium, Clostridium tertium, L.rhamnosus, C.albicans, then possible acalculous cholecystitis s/p perc cony (9/11) with negative bile fluid cx, then had CT with [facility] for which received 7 days cefepime EOT 11/22, but then developed fevers up to 105 on 11/23, associated with hypotension, and rising leukocytosis to 12k, UA neg, BCx x2 with C.glabrata. RUE PICC line removed 11/24 (had been receiving TPN through this line).     # C.glabrata CLABSI (from RUE PICC for TPN)  - S/p removal of PICC 11/24. Surveillance BCx from shortly after PICC removal on 11/24 also positive, but second BCx from 11/24 and BCx from 11/25, 11/26, and 11/27 ngtd  - If EKG today w/ QTc <480, can transition to PO Fluconazole 600mg qd if able/taking PO, otherwise can also transition IV Fluconazole 600mg qd, for total 2 week course through 12/08  - If EKG w/ QTc >480, continue IV caspofungin 50mg daily through 12/08  - recommend 72 hours of negative blood cultures before new central line placement  - TTE neg 11/24 and ophtho exam negative 11/24    ID Team 1 will sign off. Please re-consult if new questions arise.  78M w/ hx Crohn’s disease and prolonged hospitalization since 6/23, admitted for SBO s/p open TRE, SBR x 3, left in discontinuity with abthera, then RTOR for ileocolic resection and small bowel anastamosis with abdominal closure (6/28) c/b fluid collection/feculent peritonitis s/p IR aspiration (7/3) and then exlap, washout, ileocolic resection with end ileostomy, blow hole colostomy, red rubber from ileostomy to small bowel anastomosis, and vicryl bridging mesh (7/5) with abdominal cx at that time w/ VRE.casseliflavus, VRE.gallinarum E.faecium, Clostridium tertium, L.rhamnosus, C.albicans, then possible acalculous cholecystitis s/p perc cony (9/11) with negative bile fluid cx, then had CT with [facility] for which received 7 days cefepime EOT 11/22, but then developed fevers up to 105 on 11/23, associated with hypotension, and rising leukocytosis to 12k, UA neg, BCx x2 with C.glabrata. RUE PICC line removed 11/24 (had been receiving TPN through this line).     # C.glabrata CLABSI (from RUE PICC for TPN) - Fluc SDD DOMENICO 16  - S/p removal of PICC 11/24. Surveillance BCx from shortly after PICC removal on 11/24 also positive, but second BCx from 11/24 and BCx from 11/25, 11/26, and 11/27 without candida thusfar  - If EKG today w/ QTc <480, can transition to PO Fluconazole 600mg qd if able/taking PO (or Fluconazole 600mg IV qd if not taking PO meds). If EKG w/ QTc >480, continue IV caspofungin 50mg daily  - Total duration is 2 weeks EOT 12/08  - OK to place another PICC line if needed for TPN, however would make sure this is a dedicated line not to be used for other medications/blood draws.  - TTE neg 11/24 and ophtho exam negative 11/24    # L.rhamnosus from 1/4 bottles from 11/26 blood cultures - likely contaminant  - Repeat blood cultures ngtd without any directed tx for this organism. No signs of active intra-abdominal process at this time, or other likely source, and patient's signs of infection have resolved with tx of candidemia as above.  - F/u repeat blood cultures    ID Team 1 will sign off. Please re-consult if new questions arise or if new blood culture results become available.

## 2023-12-01 NOTE — PROGRESS NOTE ADULT - SUBJECTIVE AND OBJECTIVE BOX
SUBJECTIVE:   Patient seen and examined at bedside this AM   No acute overnight events   Patient concerned about recent lab trends, otherwise no complaints   Voiding spontaneously and adequately   Tolerating diet      MEDICATIONS  (STANDING):  albumin human 25% IVPB 50 milliLiter(s) IV Intermittent every 8 hours  caspofungin IVPB 50 milliGRAM(s) IV Intermittent every 24 hours  chlorhexidine 2% Cloths 1 Application(s) Topical <User Schedule>  cholestyramine Powder (Sugar-Free) 4 Gram(s) Oral daily  dextrose 10% + sodium chloride 0.9%. 1000 milliLiter(s) (100 mL/Hr) IV Continuous <Continuous>  epoetin matt-epbx (RETACRIT) Injectable 44798 Unit(s) SubCutaneous every 7 days  ergocalciferol 92115 Unit(s) Oral <User Schedule>  folic acid Injectable 1 milliGRAM(s) IV Push daily  glucagon  Injectable 1 milliGRAM(s) IntraMuscular once  heparin   Injectable 5000 Unit(s) SubCutaneous every 8 hours  hydrALAZINE Injectable 10 milliGRAM(s) IV Push every 8 hours  levothyroxine Injectable 30 MICROGram(s) IV Push at bedtime  metoprolol tartrate Injectable 5 milliGRAM(s) IV Push every 6 hours  octreotide  Injectable 100 MICROGram(s) IV Push three times a day  pantoprazole  Injectable 40 milliGRAM(s) IV Push daily  thiamine Injectable 100 milliGRAM(s) IV Push every 24 hours  ursodiol Suspension 300 milliGRAM(s) Oral every 8 hours  venlafaxine XR. 150 milliGRAM(s) Oral two times a day    MEDICATIONS  (PRN):  LORazepam   Injectable 0.5 milliGRAM(s) IV Push at bedtime PRN Anxiety  ondansetron Injectable 4 milliGRAM(s) IV Push every 8 hours PRN Nausea and/or Vomiting      Vital Signs Last 24 Hrs  T(C): 36.6 (01 Dec 2023 09:00), Max: 36.6 (01 Dec 2023 01:15)  T(F): 97.8 (01 Dec 2023 09:00), Max: 97.9 (01 Dec 2023 01:15)  HR: 87 (01 Dec 2023 09:00) (84 - 89)  BP: 134/62 (01 Dec 2023 09:00) (121/60 - 159/73)  BP(mean): 89 (01 Dec 2023 09:00) (86 - 105)  RR: 29 (01 Dec 2023 09:00) (13 - 29)  SpO2: 96% (01 Dec 2023 09:00) (93% - 99%)    Parameters below as of 01 Dec 2023 09:00  Patient On (Oxygen Delivery Method): room air        Physical Exam:  Neurological: AAOx3, CNII-XII intact,  strength 5/5 b/l  ENT: mucus membrane moist  Cardiovascular: RRR  Respiratory: CTA  Gastrointestinal: soft, NT, ND, right perc cony with dark bilious drainage. fistula with wound manager with grayish colored liquid and some formed content. wound bed pink, no bleeding. right sided ostomy with red rubber intact.   Extremities: warm, no dependent edema  Vascular: no cyanosis/erythema  Skin: no rashes  MSK: no joint swelling.       I&O's Summary    30 Nov 2023 07:01  -  01 Dec 2023 07:00  --------------------------------------------------------  IN: 4460 mL / OUT: 4575 mL / NET: -115 mL    01 Dec 2023 07:01  -  01 Dec 2023 11:00  --------------------------------------------------------  IN: 440 mL / OUT: 525 mL / NET: -85 mL        LABS:                        8.5    9.98  )-----------( 211      ( 01 Dec 2023 04:29 )             26.3     12-01    136  |  110<H>  |  33<H>  ----------------------------<  122<H>  4.3   |  14<L>  |  2.89<H>    Ca    9.0      01 Dec 2023 04:29  Phos  3.8     12-01  Mg     2.1     12-01    TPro  8.3  /  Alb  2.5<L>  /  TBili  7.6<H>  /  DBili  5.1<H>  /  AST  96<H>  /  ALT  71<H>  /  AlkPhos  234<H>  12-01    PT/INR - ( 01 Dec 2023 04:29 )   PT: 13.9 sec;   INR: 1.23          PTT - ( 01 Dec 2023 04:29 )  PTT:38.4 sec  Urinalysis Basic - ( 01 Dec 2023 04:29 )    Color: x / Appearance: x / SG: x / pH: x  Gluc: 122 mg/dL / Ketone: x  / Bili: x / Urobili: x   Blood: x / Protein: x / Nitrite: x   Leuk Esterase: x / RBC: x / WBC x   Sq Epi: x / Non Sq Epi: x / Bacteria: x      CAPILLARY BLOOD GLUCOSE        LIVER FUNCTIONS - ( 01 Dec 2023 04:29 )  Alb: 2.5 g/dL / Pro: 8.3 g/dL / ALK PHOS: 234 U/L / ALT: 71 U/L / AST: 96 U/L / GGT: x             RADIOLOGY & ADDITIONAL STUDIES:   SUBJECTIVE:   Patient seen and examined at bedside this AM   No acute overnight events   Patient concerned about recent lab trends, otherwise no complaints   Voiding spontaneously and adequately   Tolerating diet      MEDICATIONS  (STANDING):  albumin human 25% IVPB 50 milliLiter(s) IV Intermittent every 8 hours  caspofungin IVPB 50 milliGRAM(s) IV Intermittent every 24 hours  chlorhexidine 2% Cloths 1 Application(s) Topical <User Schedule>  cholestyramine Powder (Sugar-Free) 4 Gram(s) Oral daily  dextrose 10% + sodium chloride 0.9%. 1000 milliLiter(s) (100 mL/Hr) IV Continuous <Continuous>  epoetin matt-epbx (RETACRIT) Injectable 65624 Unit(s) SubCutaneous every 7 days  ergocalciferol 83800 Unit(s) Oral <User Schedule>  folic acid Injectable 1 milliGRAM(s) IV Push daily  glucagon  Injectable 1 milliGRAM(s) IntraMuscular once  heparin   Injectable 5000 Unit(s) SubCutaneous every 8 hours  hydrALAZINE Injectable 10 milliGRAM(s) IV Push every 8 hours  levothyroxine Injectable 30 MICROGram(s) IV Push at bedtime  metoprolol tartrate Injectable 5 milliGRAM(s) IV Push every 6 hours  octreotide  Injectable 100 MICROGram(s) IV Push three times a day  pantoprazole  Injectable 40 milliGRAM(s) IV Push daily  thiamine Injectable 100 milliGRAM(s) IV Push every 24 hours  ursodiol Suspension 300 milliGRAM(s) Oral every 8 hours  venlafaxine XR. 150 milliGRAM(s) Oral two times a day    MEDICATIONS  (PRN):  LORazepam   Injectable 0.5 milliGRAM(s) IV Push at bedtime PRN Anxiety  ondansetron Injectable 4 milliGRAM(s) IV Push every 8 hours PRN Nausea and/or Vomiting      Vital Signs Last 24 Hrs  T(C): 36.6 (01 Dec 2023 09:00), Max: 36.6 (01 Dec 2023 01:15)  T(F): 97.8 (01 Dec 2023 09:00), Max: 97.9 (01 Dec 2023 01:15)  HR: 87 (01 Dec 2023 09:00) (84 - 89)  BP: 134/62 (01 Dec 2023 09:00) (121/60 - 159/73)  BP(mean): 89 (01 Dec 2023 09:00) (86 - 105)  RR: 29 (01 Dec 2023 09:00) (13 - 29)  SpO2: 96% (01 Dec 2023 09:00) (93% - 99%)    Parameters below as of 01 Dec 2023 09:00  Patient On (Oxygen Delivery Method): room air        Physical Exam:  Neurological: AAOx3, CNII-XII intact,  strength 5/5 b/l  ENT: mucus membrane moist  Cardiovascular: RRR  Respiratory: CTA  Gastrointestinal: soft, NT, ND, right perc cony with dark bilious drainage. fistula with wound manager with grayish colored liquid and some formed content. wound bed pink, no bleeding. right sided ostomy with red rubber intact.   Extremities: warm, no dependent edema  Vascular: no cyanosis/erythema  Skin: no rashes  MSK: no joint swelling.       I&O's Summary    30 Nov 2023 07:01  -  01 Dec 2023 07:00  --------------------------------------------------------  IN: 4460 mL / OUT: 4575 mL / NET: -115 mL    01 Dec 2023 07:01  -  01 Dec 2023 11:00  --------------------------------------------------------  IN: 440 mL / OUT: 525 mL / NET: -85 mL        LABS:                        8.5    9.98  )-----------( 211      ( 01 Dec 2023 04:29 )             26.3     12-01    136  |  110<H>  |  33<H>  ----------------------------<  122<H>  4.3   |  14<L>  |  2.89<H>    Ca    9.0      01 Dec 2023 04:29  Phos  3.8     12-01  Mg     2.1     12-01    TPro  8.3  /  Alb  2.5<L>  /  TBili  7.6<H>  /  DBili  5.1<H>  /  AST  96<H>  /  ALT  71<H>  /  AlkPhos  234<H>  12-01    PT/INR - ( 01 Dec 2023 04:29 )   PT: 13.9 sec;   INR: 1.23          PTT - ( 01 Dec 2023 04:29 )  PTT:38.4 sec  Urinalysis Basic - ( 01 Dec 2023 04:29 )    Color: x / Appearance: x / SG: x / pH: x  Gluc: 122 mg/dL / Ketone: x  / Bili: x / Urobili: x   Blood: x / Protein: x / Nitrite: x   Leuk Esterase: x / RBC: x / WBC x   Sq Epi: x / Non Sq Epi: x / Bacteria: x      CAPILLARY BLOOD GLUCOSE        LIVER FUNCTIONS - ( 01 Dec 2023 04:29 )  Alb: 2.5 g/dL / Pro: 8.3 g/dL / ALK PHOS: 234 U/L / ALT: 71 U/L / AST: 96 U/L / GGT: x             RADIOLOGY & ADDITIONAL STUDIES:   SUBJECTIVE:   Patient seen and examined at bedside this AM   No acute overnight events   Patient concerned about recent lab trends, otherwise no complaints   Voiding spontaneously and adequately   Tolerating diet      MEDICATIONS  (STANDING):  albumin human 25% IVPB 50 milliLiter(s) IV Intermittent every 8 hours  caspofungin IVPB 50 milliGRAM(s) IV Intermittent every 24 hours  chlorhexidine 2% Cloths 1 Application(s) Topical <User Schedule>  cholestyramine Powder (Sugar-Free) 4 Gram(s) Oral daily  dextrose 10% + sodium chloride 0.9%. 1000 milliLiter(s) (100 mL/Hr) IV Continuous <Continuous>  epoetin matt-epbx (RETACRIT) Injectable 02338 Unit(s) SubCutaneous every 7 days  ergocalciferol 12893 Unit(s) Oral <User Schedule>  folic acid Injectable 1 milliGRAM(s) IV Push daily  glucagon  Injectable 1 milliGRAM(s) IntraMuscular once  heparin   Injectable 5000 Unit(s) SubCutaneous every 8 hours  hydrALAZINE Injectable 10 milliGRAM(s) IV Push every 8 hours  levothyroxine Injectable 30 MICROGram(s) IV Push at bedtime  metoprolol tartrate Injectable 5 milliGRAM(s) IV Push every 6 hours  octreotide  Injectable 100 MICROGram(s) IV Push three times a day  pantoprazole  Injectable 40 milliGRAM(s) IV Push daily  thiamine Injectable 100 milliGRAM(s) IV Push every 24 hours  ursodiol Suspension 300 milliGRAM(s) Oral every 8 hours  venlafaxine XR. 150 milliGRAM(s) Oral two times a day    MEDICATIONS  (PRN):  LORazepam   Injectable 0.5 milliGRAM(s) IV Push at bedtime PRN Anxiety  ondansetron Injectable 4 milliGRAM(s) IV Push every 8 hours PRN Nausea and/or Vomiting      Vital Signs Last 24 Hrs  T(C): 36.6 (01 Dec 2023 09:00), Max: 36.6 (01 Dec 2023 01:15)  T(F): 97.8 (01 Dec 2023 09:00), Max: 97.9 (01 Dec 2023 01:15)  HR: 87 (01 Dec 2023 09:00) (84 - 89)  BP: 134/62 (01 Dec 2023 09:00) (121/60 - 159/73)  BP(mean): 89 (01 Dec 2023 09:00) (86 - 105)  RR: 29 (01 Dec 2023 09:00) (13 - 29)  SpO2: 96% (01 Dec 2023 09:00) (93% - 99%)    Parameters below as of 01 Dec 2023 09:00  Patient On (Oxygen Delivery Method): room air        Physical Exam:  Neurological: AAOx3, CNII-XII intact,  strength 5/5 b/l  ENT: mucus membrane moist  Cardiovascular: RRR  Respiratory: CTA  Gastrointestinal: soft, NT, ND, right perc cony with dark bilious drainage. fistula with wound manager with grayish colored liquid and some formed content. wound bed pink, no bleeding. right sided ostomy with red rubber intact.   Extremities: warm, no dependent edema  Vascular: no cyanosis/erythema  Skin: no rashes  MSK: no joint swelling.       I&O's Summary    30 Nov 2023 07:01  -  01 Dec 2023 07:00  --------------------------------------------------------  IN: 4460 mL / OUT: 4575 mL / NET: -115 mL    01 Dec 2023 07:01  -  01 Dec 2023 11:00  --------------------------------------------------------  IN: 440 mL / OUT: 525 mL / NET: -85 mL        LABS:                        8.5    9.98  )-----------( 211      ( 01 Dec 2023 04:29 )             26.3     12-01    136  |  110<H>  |  33<H>  ----------------------------<  122<H>  4.3   |  14<L>  |  2.89<H>    Ca    9.0      01 Dec 2023 04:29  Phos  3.8     12-01  Mg     2.1     12-01    TPro  8.3  /  Alb  2.5<L>  /  TBili  7.6<H>  /  DBili  5.1<H>  /  AST  96<H>  /  ALT  71<H>  /  AlkPhos  234<H>  12-01    PT/INR - ( 01 Dec 2023 04:29 )   PT: 13.9 sec;   INR: 1.23          PTT - ( 01 Dec 2023 04:29 )  PTT:38.4 sec  Urinalysis Basic - ( 01 Dec 2023 04:29 )    Color: x / Appearance: x / SG: x / pH: x  Gluc: 122 mg/dL / Ketone: x  / Bili: x / Urobili: x   Blood: x / Protein: x / Nitrite: x   Leuk Esterase: x / RBC: x / WBC x   Sq Epi: x / Non Sq Epi: x / Bacteria: x      CAPILLARY BLOOD GLUCOSE        LIVER FUNCTIONS - ( 01 Dec 2023 04:29 )  Alb: 2.5 g/dL / Pro: 8.3 g/dL / ALK PHOS: 234 U/L / ALT: 71 U/L / AST: 96 U/L / GGT: x             RADIOLOGY & ADDITIONAL STUDIES:

## 2023-12-01 NOTE — PROGRESS NOTE ADULT - SUBJECTIVE AND OBJECTIVE BOX
Interval Events:  Patient seen and examined at bedside.      Allergies    penicillin (Angioedema)    Intolerances        Vital Signs Last 24 Hrs  T(C): 36.5 (01 Dec 2023 05:15), Max: 36.6 (01 Dec 2023 01:15)  T(F): 97.7 (01 Dec 2023 05:15), Max: 97.9 (01 Dec 2023 01:15)  HR: 89 (01 Dec 2023 07:00) (84 - 89)  BP: 142/66 (01 Dec 2023 07:00) (121/60 - 179/76)  BP(mean): 95 (01 Dec 2023 07:00) (86 - 109)  RR: 19 (01 Dec 2023 07:00) (13 - 22)  SpO2: 95% (01 Dec 2023 07:00) (93% - 99%)    Parameters below as of 01 Dec 2023 08:00  Patient On (Oxygen Delivery Method): room air        11-30 @ 07:01  -  12-01 @ 07:00  --------------------------------------------------------  IN: 4460 mL / OUT: 4575 mL / NET: -115 mL    12-01 @ 07:01  -  12-01 @ 07:22  --------------------------------------------------------  IN: 100 mL / OUT: 0 mL / NET: 100 mL      11-30 @ 07:01  -  12-01 @ 07:00  --------------------------------------------------------  IN: 4460 mL / OUT: 4575 mL / NET: -115 mL    12-01 @ 07:01  -  12-01 @ 07:22  --------------------------------------------------------  IN: 100 mL / OUT: 0 mL / NET: 100 mL        Physical Exam:       Gen: NAD well nourished  Neuro: A&OX3 No deficits  CV:RRR Reg s1s2 noM  Pulm: CTA b/l No w/r/r  ABD: Soft, ND, NT, no rebound tenderness, no guarding, abd soft, PCT bilious output, ECF/ileostomy w wound manager in place w thick output.  Ext: No C/C/E   Vasc: + DP b/l   Skin: no rashes noted  MSK: No joint swelling  Psych: No signs of anxiety or depression      LABS:      CBC Full  -  ( 01 Dec 2023 04:29 )  WBC Count : 9.98 K/uL  RBC Count : 2.67 M/uL  Hemoglobin : 8.5 g/dL  Hematocrit : 26.3 %  Platelet Count - Automated : 211 K/uL  Mean Cell Volume : 98.5 fl  Mean Cell Hemoglobin : 31.8 pg  Mean Cell Hemoglobin Concentration : 32.3 gm/dL  Auto Neutrophil # : x  Auto Lymphocyte # : x  Auto Monocyte # : x  Auto Eosinophil # : x  Auto Basophil # : x  Auto Neutrophil % : x  Auto Lymphocyte % : x  Auto Monocyte % : x  Auto Eosinophil % : x  Auto Basophil % : x    12-01    136  |  110<H>  |  33<H>  ----------------------------<  122<H>  4.3   |  14<L>  |  2.89<H>    Ca    9.0      01 Dec 2023 04:29  Phos  3.8     12-01  Mg     2.1     12-01    TPro  8.3  /  Alb  2.5<L>  /  TBili  7.6<H>  /  DBili  5.1<H>  /  AST  96<H>  /  ALT  71<H>  /  AlkPhos  234<H>  12-01    PT/INR - ( 01 Dec 2023 04:29 )   PT: 13.9 sec;   INR: 1.23          PTT - ( 01 Dec 2023 04:29 )  PTT:38.4 sec      Urinalysis Basic - ( 01 Dec 2023 04:29 )    Color: x / Appearance: x / SG: x / pH: x  Gluc: 122 mg/dL / Ketone: x  / Bili: x / Urobili: x   Blood: x / Protein: x / Nitrite: x   Leuk Esterase: x / RBC: x / WBC x   Sq Epi: x / Non Sq Epi: x / Bacteria: x              RADIOLOGY & ADDITIONAL STUDIES (The following images were personally reviewed):          A/p: 78M w PMH Crohn's, AFib/Flutter s/p DCCVs on amiodarone, remote ileocectomy and open appendectomy. Admitted (6/23) for SBO vs Crohns flare, s/p NGT decompression and s/p lap converted to open TRE, SBR x 3, left in discontinuity with abthera vac on (6/27), RTOR for ileocolic resection, small bowel anastomosis, and abdominal wall closure on (6/28), c/b fluid collection s/p IR aspiration of perihepatic fluid on (7/3), c/b wound dehiscence s/p RTOR exlap, washout, ileocolic resection with end ileostomy, blow hole colostomy, red rubber from ileostomy to small bowel anastomosis; vicryl bridging mesh on (7/5) transferred to SICU postoperatively for hemodynamic monitoring, with hospital course complicated by periods of severe ELVI and hyponatremia, which resolved but stepped back up for to SICU on (9/10) for acute AMS, intermittent hypoglycemia, AFib with RVR. Percutaneous Cholecystostomy placed on (9/11) for enlarged GB, PCT capped 10/5 and uncapped 10/25 for hyperbilirubinemia, Right brachial DVT, left basillic and cephalic superficial thrombus on duplex 11/2, CT 11/14 with ALDEN ground glass opacity of unclear etiology, completed empiric 7day cefepime course 11/22, rising T-bili of unclear etilogy, now w fungemia (candida glabrata) CLABSI, and worsenening renal function.    NEURO: AMS resolved, likely septic encephalopathy, EEG neg. Hx of depression - cont Effexor. Nausea: Zofran PRN. Anxiety: Lorazepam PRN.  CV: Hx Afib/flutter: s/p DCCV, Amio stopped due to persistent transaminitis. Continue Metoprolol 5q6 with hold parameters. Hx HLD: Lipitor held given high LFTs. TTE  (7/18) - PASP 64mmHg, EF 65-70%, Hx HTN -  stopped PO Norvasc as unlikely absorbing, switch to hydralazine IV, holding Losartan. Appears euvolemic on exam.  PULM: CT from 11/14 with ALDEN ground glass opacity of unclear etiology. COVID negative. RVP negative. completed 7d empiric cefepime 11/22 to cover for potential PNA. Now on RA.   GI/FEN: Low res, low fat, high protein diet. Cont Ensure max x1/day. 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 High output EC fistula: cont Octreotide, Tincture of Opium. Cholestyramine 10/18. Transaminitis elevated T bili of unclear origin--consult to Team 1 Sgy, s/p Perc Deb on 9/11; MRCP 10/30 no obstruction, seen by Hepatology started on ursodiol, RUQ US 11/25 CBD only 5mm, hepatic vessels patent. Requesting repeat Duplex w portal venous and arterial system, as well as image of ampulla. MRCP neg, cont PPI. Monitor drainage from fistula, bolus as needed to keep I&O even. Ensure once daily with LPS. On D10NS@100 for now while holding TPN. Folate, thiamine added.  : Voids. ELVI, Cr peaking, Urine lytes FeUrea Intrinsic ATN, unlikely volume down as spec grav low and good UOP. MARLO Duplex pending, RP US pending, CK wnl, Renal consult placed.   ENDO: Hx hypothyroid: IV Synthroid, TSH wnl on 10/23  ID: Per ID, unlikely Caspo leading to ELVI, so will continue course. chest CT with ALDEN with ground glass opacities, unclear etiology. completed empiric 7days cefepime (11/15-11/22), bld cx from 11/22 grew candida glabrata. likely CLABSI. PICC removed 11/24. ID consulted. added caspofungin (11/24--) . repeat surveillance blood cx NGTD. ophthalmology evaulated - normal ocular exam. echo no vegetation, off cefepime/flagyl. recent MRSA swab negative, RVP panel negative, COVID negative this past week. Cont Nystatin powder // PREVIOUSLY had C. tertium, Lactobacillis from his IR cx 7/3, and candida albicans, lactobacillus, vanc sensitive E faecium, vanc resistant E gallinarum, vanc resistant E casseliflavus, lactobacillus from his OR cx 7/5. Completed course of abx with Imipenem (9/10-9/15). Imipenem (8/26--) imipenem (6/30-7/12, 7/23-7/30), Dapto (6/30-7/5 and 7/23-7/24). Empiric dapto (8/23-24) and cefepime (8/23-24).   PPX: SCDs, SQH, was on Therapeutic lovenox bid for R brachial vein DVT, but will hold off on hep gtt since repeat US Duplex negative.  HEME: Anemia: continue Epogen weekly. S/p Iron Sucrose 200 qd x 3 days for chronic anemia.  LINES: PIVs // DC: LUE PICC (9/1-11/1 ), RUE PICC: (11/1-11/24)   WOUNDS/DRAINS: Abdominal wound and fistula with wound manager in place dressing change Tuesday and Friday. RLQ Ostomy. IR perc deb tube. // DC: L Clay drain.  PT: Ambulate as tolerated   DISPO: SICU due to complicated hospital course.   Interval Events:  Given 1L bolus overnight for Goal I/O even.   Patient seen and examined at bedside.      Allergies    penicillin (Angioedema)    Intolerances        Vital Signs Last 24 Hrs  T(C): 36.5 (01 Dec 2023 05:15), Max: 36.6 (01 Dec 2023 01:15)  T(F): 97.7 (01 Dec 2023 05:15), Max: 97.9 (01 Dec 2023 01:15)  HR: 89 (01 Dec 2023 07:00) (84 - 89)  BP: 142/66 (01 Dec 2023 07:00) (121/60 - 179/76)  BP(mean): 95 (01 Dec 2023 07:00) (86 - 109)  RR: 19 (01 Dec 2023 07:00) (13 - 22)  SpO2: 95% (01 Dec 2023 07:00) (93% - 99%)    Parameters below as of 01 Dec 2023 08:00  Patient On (Oxygen Delivery Method): room air        11-30 @ 07:01  -  12-01 @ 07:00  --------------------------------------------------------  IN: 4460 mL / OUT: 4575 mL / NET: -115 mL    12-01 @ 07:01  -  12-01 @ 07:22  --------------------------------------------------------  IN: 100 mL / OUT: 0 mL / NET: 100 mL      11-30 @ 07:01  -  12-01 @ 07:00  --------------------------------------------------------  IN: 4460 mL / OUT: 4575 mL / NET: -115 mL    12-01 @ 07:01  -  12-01 @ 07:22  --------------------------------------------------------  IN: 100 mL / OUT: 0 mL / NET: 100 mL        Physical Exam:       Gen: NAD well nourished  Neuro: A&OX3 No deficits  HEENT: Tongue Dry  CV:RRR Reg s1s2 no M  Pulm: CTA b/l No w/r/r  ABD: Soft, ND, NT, no rebound tenderness, no guarding, abd soft, PCT bilious output, ECF/ileostomy w wound manager in place w brown liquid output. mucus fistula with red rubber in place with minimal serous output.   Ext: No C/C/E + 1-2Pitting edema B/l   Vasc: + DP b/l   Skin: no rashes noted  MSK: No joint swelling  Psych: No signs of anxiety or depression      LABS:      CBC Full  -  ( 01 Dec 2023 04:29 )  WBC Count : 9.98 K/uL  RBC Count : 2.67 M/uL  Hemoglobin : 8.5 g/dL  Hematocrit : 26.3 %  Platelet Count - Automated : 211 K/uL  Mean Cell Volume : 98.5 fl  Mean Cell Hemoglobin : 31.8 pg  Mean Cell Hemoglobin Concentration : 32.3 gm/dL  Auto Neutrophil # : x  Auto Lymphocyte # : x  Auto Monocyte # : x  Auto Eosinophil # : x  Auto Basophil # : x  Auto Neutrophil % : x  Auto Lymphocyte % : x  Auto Monocyte % : x  Auto Eosinophil % : x  Auto Basophil % : x    12-01    136  |  110<H>  |  33<H>  ----------------------------<  122<H>  4.3   |  14<L>  |  2.89<H>    Ca    9.0      01 Dec 2023 04:29  Phos  3.8     12-01  Mg     2.1     12-01    TPro  8.3  /  Alb  2.5<L>  /  TBili  7.6<H>  /  DBili  5.1<H>  /  AST  96<H>  /  ALT  71<H>  /  AlkPhos  234<H>  12-01    PT/INR - ( 01 Dec 2023 04:29 )   PT: 13.9 sec;   INR: 1.23          PTT - ( 01 Dec 2023 04:29 )  PTT:38.4 sec      Urinalysis Basic - ( 01 Dec 2023 04:29 )    Color: x / Appearance: x / SG: x / pH: x  Gluc: 122 mg/dL / Ketone: x  / Bili: x / Urobili: x   Blood: x / Protein: x / Nitrite: x   Leuk Esterase: x / RBC: x / WBC x   Sq Epi: x / Non Sq Epi: x / Bacteria: x              RADIOLOGY & ADDITIONAL STUDIES (The following images were personally reviewed):          A/p: 78M w PMH Crohn's, AFib/Flutter s/p DCCVs on amiodarone, remote ileocectomy and open appendectomy. Admitted (6/23) for SBO vs Crohns flare, s/p NGT decompression and s/p lap converted to open TRE, SBR x 3, left in discontinuity with abthera vac on (6/27), RTOR for ileocolic resection, small bowel anastomosis, and abdominal wall closure on (6/28), c/b fluid collection s/p IR aspiration of perihepatic fluid on (7/3), c/b wound dehiscence s/p RTOR exlap, washout, ileocolic resection with end ileostomy, blow hole colostomy, red rubber from ileostomy to small bowel anastomosis; vicryl bridging mesh on (7/5) transferred to SICU postoperatively for hemodynamic monitoring, with hospital course complicated by periods of severe ELVI and hyponatremia, which resolved but stepped back up for to SICU on (9/10) for acute AMS, intermittent hypoglycemia, AFib with RVR. Percutaneous Cholecystostomy placed on (9/11) for enlarged GB, PCT capped 10/5 and uncapped 10/25 for hyperbilirubinemia, Right brachial DVT, left basillic and cephalic superficial thrombus on duplex 11/2, CT 11/14 with ALDEN ground glass opacity of unclear etiology, completed empiric 7day cefepime course 11/22, rising T-bili of unclear etilogy, now w fungemia (candida glabrata) CLABSI, and worsening renal function.    NEURO: AMS resolved, likely septic encephalopathy, EEG neg. Hx of depression - cont Effexor. Nausea: Zofran PRN. Anxiety: Lorazepam PRN.  CV: Hx Afib/flutter: s/p DCCV, Amio stopped due to persistent transaminitis. Continue Metoprolol 5q6 with hold parameters. Hx HLD: Lipitor held given high LFTs. TTE  (7/18) - PASP 64mmHg, EF 65-70%, Hx HTN -  stopped PO Norvasc as unlikely absorbing, switch to hydralazine IV, holding Losartan. Appears euvolemic on exam.  PULM: CT from 11/14 with ALDEN ground glass opacity of unclear etiology. COVID negative. RVP negative. completed 7d empiric cefepime 11/22 to cover for potential PNA. Now on RA.   GI/FEN: Low res, low fat, high protein diet. Cont Ensure max x1/day. 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 will replace PICC line on 12/4 and restart TPN.  High output EC fistula: cont Octreotide,& Cholestyramine 10/18. Transaminitis elevated T bili of unclear origin--consult to Team 1 Sgy, s/p Perc Deb on 9/11; MRCP 10/30 no obstruction, seen by Hepatology started on ursodiol, RUQ US 11/25 CBD only 5mm, hepatic vessels patent  Requesting repeat Duplex w portal venous and arterial system, as well as image of ampulla. MRCP neg, cont PPI. Monitor drainage from fistula, bolus as needed to keep I&O even. Ensure once daily with LPS. On D10NS@100 for now while holding TPN. Folate, thiamine added.  : Voids. ELVI, Cr peaking, Urine lytes FeUrea Intrinsic ATN, unlikely volume down as spec grav low and good UOP. MARLO Duplex pending, RP US pending, CK wnl, Renal consult placed.   ENDO: Hx hypothyroid: IV Synthroid, TSH wnl on 10/23  ID: Per ID, unlikely Caspo leading to ELVI, so will continue course. Chest CT with ALDEN with ground glass opacities, unclear etiology. bld cx from 11/22 grew candida glabrata. likely CLABSI. PICC removed 11/24.  ID consulted. added caspofungin (11/24--). Repeat surveillance blood cx NGTD. ophthalmology evaulated - normal ocular exam. echo no vegetation, will replace picc line on 12/4.  off cefepime/flagyl. recent MRSA swab negative, RVP panel negative, COVID negative this past week. Cont Nystatin powder // PREVIOUSLY  completed empiric 7days cefepime (11/15-11/22), had C. tertium, Lactobacillis from his IR cx 7/3, and candida albicans, lactobacillus, vanc sensitive E faecium, vanc resistant E gallinarum, vanc resistant E casseliflavus, lactobacillus from his OR cx 7/5. Completed course of abx with Imipenem (9/10-9/15). Imipenem (8/26--) imipenem (6/30-7/12, 7/23-7/30), Dapto (6/30-7/5 and 7/23-7/24). Empiric dapto (8/23-24) and cefepime (8/23-24).   PPX: SCDs, SQH, was on Therapeutic lovenox bid for R brachial vein DVT, but will hold off on hep gtt since repeat US Duplex negative.  HEME: Anemia: continue Epogen weekly. S/p Iron Sucrose 200 qd x 3 days for chronic anemia.  LINES: PIVs // DC: LUE PICC (9/1-11/1 ), RUE PICC: (11/1-11/24)   WOUNDS/DRAINS: Abdominal wound and fistula with wound manager in place dressing change Tuesday and Friday. RLQ Ostomy. IR perc deb tube. // DC: L Clay drain.  PT: Ambulate as tolerated   DISPO: SICU due to complicated hospital course.

## 2023-12-01 NOTE — PROGRESS NOTE ADULT - SUBJECTIVE AND OBJECTIVE BOX
INTERVAL HPI/OVERNIGHT EVENTS:    OVERNIGHT: No overnight events.  SUBJECTIVE: Patient seen and examined at bedside.     REVIEW OF SYSTEMS:    CONSTITUTIONAL: No weakness, fevers or chills  EYES/ENT: No visual changes;  No vertigo or throat pain   NECK: No pain or stiffness  RESPIRATORY: No cough, wheezing, hemoptysis; No shortness of breath  CARDIOVASCULAR: No chest pain or palpitations  GASTROINTESTINAL: No abdominal or epigastric pain. No nausea, vomiting, or hematemesis; No diarrhea or constipation. No melena or hematochezia.  GENITOURINARY: No dysuria, frequency or hematuria  NEUROLOGICAL: No numbness or weakness  SKIN: No itching, burning, rashes, or lesions   MSK: no joint pain, no joint swelling  All other review of systems is negative unless indicated above.      Allergies    penicillin (Angioedema)    Intolerances        ANTIBIOTICS/RELEVANT:  antimicrobials  caspofungin IVPB 50 milliGRAM(s) IV Intermittent every 24 hours    immunologic:  epoetin matt-epbx (RETACRIT) Injectable 06617 Unit(s) SubCutaneous every 7 days    OTHER:  albumin human 25% IVPB 50 milliLiter(s) IV Intermittent every 8 hours  chlorhexidine 2% Cloths 1 Application(s) Topical <User Schedule>  cholestyramine Powder (Sugar-Free) 4 Gram(s) Oral daily  dextrose 10% + sodium chloride 0.45% 1000 milliLiter(s) IV Continuous <Continuous>  ergocalciferol 71082 Unit(s) Oral <User Schedule>  famotidine Injectable 20 milliGRAM(s) IV Push daily  folic acid Injectable 1 milliGRAM(s) IV Push daily  glucagon  Injectable 1 milliGRAM(s) IntraMuscular once  heparin   Injectable 5000 Unit(s) SubCutaneous every 8 hours  hydrALAZINE Injectable 10 milliGRAM(s) IV Push every 8 hours  levothyroxine Injectable 30 MICROGram(s) IV Push at bedtime  LORazepam   Injectable 0.5 milliGRAM(s) IV Push at bedtime PRN  metoprolol tartrate Injectable 5 milliGRAM(s) IV Push every 6 hours  octreotide  Injectable 100 MICROGram(s) IV Push three times a day  ondansetron Injectable 4 milliGRAM(s) IV Push every 8 hours PRN  thiamine Injectable 100 milliGRAM(s) IV Push every 24 hours  ursodiol Suspension 300 milliGRAM(s) Oral every 8 hours  venlafaxine XR. 150 milliGRAM(s) Oral two times a day      Objective:  Vital Signs Last 24 Hrs  T(C): 36.6 (01 Dec 2023 09:00), Max: 36.6 (01 Dec 2023 01:15)  T(F): 97.8 (01 Dec 2023 09:00), Max: 97.9 (01 Dec 2023 01:15)  HR: 87 (01 Dec 2023 14:00) (84 - 89)  BP: 156/91 (01 Dec 2023 14:00) (121/60 - 159/73)  BP(mean): 117 (01 Dec 2023 14:00) (84 - 117)  RR: 25 (01 Dec 2023 14:00) (13 - 29)  SpO2: 98% (01 Dec 2023 14:00) (93% - 98%)    Parameters below as of 01 Dec 2023 14:00  Patient On (Oxygen Delivery Method): room air          PHYSICAL EXAM:  Constitutional: WDWN resting comfortably in bed; NAD  Head: NC/AT  Eyes: PERRLA, EOMI, clear conjunctiva  Cardiac: +S1/S2, no murmurs  Abd: soft, non-tender, right side ostomy   MSK: Prior RUE PICC site c/d/i  Neurologic: AAOx3;         LABS:                        8.5    9.98  )-----------( 211      ( 01 Dec 2023 04:29 )             26.3     12-01    136  |  110<H>  |  33<H>  ----------------------------<  122<H>  4.3   |  14<L>  |  2.89<H>    Ca    9.0      01 Dec 2023 04:29  Phos  3.8     12-01  Mg     2.1     12-01    TPro  8.3  /  Alb  2.5<L>  /  TBili  7.6<H>  /  DBili  5.1<H>  /  AST  96<H>  /  ALT  71<H>  /  AlkPhos  234<H>  12-01    PT/INR - ( 01 Dec 2023 04:29 )   PT: 13.9 sec;   INR: 1.23          PTT - ( 01 Dec 2023 04:29 )  PTT:38.4 sec  Urinalysis Basic - ( 01 Dec 2023 04:29 )    Color: x / Appearance: x / SG: x / pH: x  Gluc: 122 mg/dL / Ketone: x  / Bili: x / Urobili: x   Blood: x / Protein: x / Nitrite: x   Leuk Esterase: x / RBC: x / WBC x   Sq Epi: x / Non Sq Epi: x / Bacteria: x      RECENT CULTURES:  11-27 @ 11:30  .Blood Blood-Peripheral  --  --  --    No growth at 4 days.  --  11-26 @ 17:37  .Blood Blood  Blood Culture PCR  Blood Culture PCR  PCR    No growth at 4 days.  --  11-25 @ 08:49  .Blood Blood-Peripheral  --  --  --    No growth at 5 days.  --      RADIOLOGY & ADDITIONAL STUDIES: INTERVAL HPI/OVERNIGHT EVENTS:    OVERNIGHT: No overnight events.  SUBJECTIVE: Patient seen and examined at bedside.     REVIEW OF SYSTEMS:    CONSTITUTIONAL: No weakness, fevers or chills  EYES/ENT: No visual changes;  No vertigo or throat pain   NECK: No pain or stiffness  RESPIRATORY: No cough, wheezing, hemoptysis; No shortness of breath  CARDIOVASCULAR: No chest pain or palpitations  GASTROINTESTINAL: No abdominal or epigastric pain. No nausea, vomiting, or hematemesis; No diarrhea or constipation. No melena or hematochezia.  GENITOURINARY: No dysuria, frequency or hematuria  NEUROLOGICAL: No numbness or weakness  SKIN: No itching, burning, rashes, or lesions   MSK: no joint pain, no joint swelling  All other review of systems is negative unless indicated above.      Allergies    penicillin (Angioedema)    Intolerances        ANTIBIOTICS/RELEVANT:  antimicrobials  caspofungin IVPB 50 milliGRAM(s) IV Intermittent every 24 hours    immunologic:  epoetin matt-epbx (RETACRIT) Injectable 08994 Unit(s) SubCutaneous every 7 days    OTHER:  albumin human 25% IVPB 50 milliLiter(s) IV Intermittent every 8 hours  chlorhexidine 2% Cloths 1 Application(s) Topical <User Schedule>  cholestyramine Powder (Sugar-Free) 4 Gram(s) Oral daily  dextrose 10% + sodium chloride 0.45% 1000 milliLiter(s) IV Continuous <Continuous>  ergocalciferol 49682 Unit(s) Oral <User Schedule>  famotidine Injectable 20 milliGRAM(s) IV Push daily  folic acid Injectable 1 milliGRAM(s) IV Push daily  glucagon  Injectable 1 milliGRAM(s) IntraMuscular once  heparin   Injectable 5000 Unit(s) SubCutaneous every 8 hours  hydrALAZINE Injectable 10 milliGRAM(s) IV Push every 8 hours  levothyroxine Injectable 30 MICROGram(s) IV Push at bedtime  LORazepam   Injectable 0.5 milliGRAM(s) IV Push at bedtime PRN  metoprolol tartrate Injectable 5 milliGRAM(s) IV Push every 6 hours  octreotide  Injectable 100 MICROGram(s) IV Push three times a day  ondansetron Injectable 4 milliGRAM(s) IV Push every 8 hours PRN  thiamine Injectable 100 milliGRAM(s) IV Push every 24 hours  ursodiol Suspension 300 milliGRAM(s) Oral every 8 hours  venlafaxine XR. 150 milliGRAM(s) Oral two times a day      Objective:  Vital Signs Last 24 Hrs  T(C): 36.6 (01 Dec 2023 09:00), Max: 36.6 (01 Dec 2023 01:15)  T(F): 97.8 (01 Dec 2023 09:00), Max: 97.9 (01 Dec 2023 01:15)  HR: 87 (01 Dec 2023 14:00) (84 - 89)  BP: 156/91 (01 Dec 2023 14:00) (121/60 - 159/73)  BP(mean): 117 (01 Dec 2023 14:00) (84 - 117)  RR: 25 (01 Dec 2023 14:00) (13 - 29)  SpO2: 98% (01 Dec 2023 14:00) (93% - 98%)    Parameters below as of 01 Dec 2023 14:00  Patient On (Oxygen Delivery Method): room air          PHYSICAL EXAM:  Constitutional: alert, NAD  Eyes: the sclera and conjunctiva were normal.   ENT: the ears and nose were normal in appearance.   Neck: the appearance of the neck was normal and the neck was supple.   Pulmonary: no respiratory distress and lungs were clear to auscultation bilaterally in anterior fields  Heart: heart rate was normal and rhythm regular, no murmur  Vascular:. there was no peripheral edema  Abdomen: soft, non-tender. Perc cony with bilious output. Fistula with overlying wound manager. R side ostomy.  Neurological: no focal deficits.   Psychiatric: the affect was normal  MSK: Prior RUE PICC site c/d/i. Bilateral TKA scars without any joint swelling/tenderness/decreased ROM        LABS:                        8.5    9.98  )-----------( 211      ( 01 Dec 2023 04:29 )             26.3     12-01    136  |  110<H>  |  33<H>  ----------------------------<  122<H>  4.3   |  14<L>  |  2.89<H>    Ca    9.0      01 Dec 2023 04:29  Phos  3.8     12-01  Mg     2.1     12-01    TPro  8.3  /  Alb  2.5<L>  /  TBili  7.6<H>  /  DBili  5.1<H>  /  AST  96<H>  /  ALT  71<H>  /  AlkPhos  234<H>  12-01    PT/INR - ( 01 Dec 2023 04:29 )   PT: 13.9 sec;   INR: 1.23          PTT - ( 01 Dec 2023 04:29 )  PTT:38.4 sec  Urinalysis Basic - ( 01 Dec 2023 04:29 )    Color: x / Appearance: x / SG: x / pH: x  Gluc: 122 mg/dL / Ketone: x  / Bili: x / Urobili: x   Blood: x / Protein: x / Nitrite: x   Leuk Esterase: x / RBC: x / WBC x   Sq Epi: x / Non Sq Epi: x / Bacteria: x      RECENT CULTURES:  11-27 @ 11:30  .Blood Blood-Peripheral  --  --  --    No growth at 4 days.  --  11-26 @ 17:37  .Blood Blood  Blood Culture PCR  Blood Culture PCR  PCR    No growth at 4 days.  --  11-25 @ 08:49  .Blood Blood-Peripheral  --  --  --    No growth at 5 days.  --      RADIOLOGY & ADDITIONAL STUDIES:

## 2023-12-01 NOTE — CONSULT NOTE ADULT - ASSESSMENT
ASSESSMENT/ PLAN:  78M w/ PMHx of Crohn's, HTN, HLD, AFib/Flutter s/p multiple DCCV (on Amio), Gout, PSHx R IHR, ileocecectomy, open appy who initially presented in June for small bowel obstruction 2/2 stricturing Crohn's disease. Patient was trialed with nonoperative management, but was taken to the OR for ex lap, SBR. Post operative course has been long and complicated by fascial dehiscence requiring RTOR and post operative EC fistula causing severe electrolyte derrangements and renal dysfunction requiring prolonged ICU admission for fluid management and TPN requirements as well as and monitoring of large wound. Patient's course has also been complicated by direct hyperbilirubinemia of unclear etiology (possibly 2/2 to TPN, severe cholestasis from sepsis and chronic disease, etc.). Prior episode was managed with percutaneous cholecystectomy tube with improvement of hyperbilirubinemia  Surgery Team 1C consulted for persistent hyperbilirubinemia     Recommendations:   - Recommend repeat hepatology consult given no evidence of anatomic obstruction   - Recommend removing hepatotoxic agents if possible  - Team 1C will continue to follow  Attending aware and agrees with pl ASSESSMENT/ PLAN:  78M w/ PMHx of Crohn's, HTN, HLD, AFib/Flutter s/p multiple DCCV (on Amio), Gout, PSHx R IHR, ileocecectomy, open appy who initially presented in June for small bowel obstruction 2/2 stricturing Crohn's disease. Patient was trialed with nonoperative management, but was taken to the OR for ex lap, SBR. Post operative course has been long and complicated by fascial dehiscence requiring RTOR and post operative EC fistula causing severe electrolyte derrangements and renal dysfunction requiring prolonged ICU admission for fluid management and TPN requirements as well as and monitoring of large wound. Patient's course has also been complicated by direct hyperbilirubinemia of unclear etiology (possibly 2/2 to TPN, severe cholestasis from sepsis and chronic disease, etc.). Prior episode was managed with percutaneous cholecystectomy tube with improvement of hyperbilirubinemia  Surgery Team 1C consulted for persistent hyperbilirubinemia. Direct hyperbilirubinemia noted to be downtrending. Prior MRCP, cholecystostomy tube study with evidence of obstructive pathology. RUQ duplex U/S completed today without acute findings. Direct hyperbilirubinemia does not appear to be obstructive in nature    Recommendations:   - Recommend repeat hepatology consult given no evidence of anatomic obstruction   - Recommend removing hepatotoxic agents if possible  - Team 1C will continue to follow  Attending aware and agrees with pl

## 2023-12-01 NOTE — PROGRESS NOTE ADULT - ASSESSMENT
78M w PMH Crohn's, AFib/Flutter s/p DCCVs on amiodarone, remote ileocectomy and open appendectomy. Admitted (6/23) for SBO vs Crohns flare, s/p NGT decompression and s/p lap converted to open TRE, SBR x 3, left in discontinuity with abthera vac on (6/27), RTOR for ileocolic resection, small bowel anastomosis, and abdominal wall closure on (6/28), c/b fluid collection s/p IR aspiration of perihepatic fluid on (7/3), c/b wound dehiscence s/p RTOR exlap, washout, ileocolic resection with end ileostomy, blow hole colostomy, red rubber from ileostomy to small bowel anastomosis; vicryl bridging mesh on (7/5) transferred to SICU postoperatively for hemodynamic monitoring, with hospital course complicated by periods of severe ELVI and hyponatremia, which resolved but stepped back up for to SICU on (9/10) for acute AMS, intermittent hypoglycemia, A-fib with RVR. Percutaneous Cholecystostomy placed on (9/11) for enlarged GB, PCT capped 10/5 and uncapped 10/25 for hyperbilirubinemia, Right brachial DVT, left basillic and cephalic superficial thrombus on duplex 11/2, CT 11/14 with ALDEN ground glass opacity of unclear etiology, completed empiric 7 day cefepime course 11/22, rising T-bili of unclear etiology, now sepsis w/ fungemia (mehran glabrata), likely CLABSI.    Worsening direct hyperbilirubinemia and transaminitis - c/f obstructive biliary disease. HPB anatomy not properly visualized on last RUQ ultrasound 11/25 due to medical condition   Up-trending Cr - intrinsic ELVI based on urine lytes     Plan:   - Continue Caspo for fungemia per ID   - Replace PICC for TPN as soon as able   - IVF (D10 NS)   - Trend Cr and monitor UOP   - Appreciate Nephrology input - follow up renal ultrasound   - Appreciate hepatology & HPB surgery input on worsening direct hyperbilirubinemia   - Encourage OOB/ambulation   - Appreciate SICU care

## 2023-12-01 NOTE — ADVANCED PRACTICE NURSE CONSULT - ASSESSMENT
New Dustin's wound manager applied over fistula site. Large amount of solid efflluent noted in appliance along with some liquid so drain end cut and ostomy clip used to more easily empty thicker effluent. Small amount of blood-tinged effluent in pouch noted when old appliance removed but no teetee bleeding during application. Fistulized stoma located to left of healing wound bed. Stoma rings applied over area for protection and to prevent leaks prior to application of new wound/fistula manager.  New Coloplast soft convex 1 piece ostomy appliance placed over ileostomy after red rubber catheter inserted. Dr. Samuel aware of appearance of stoma, which is producing cloudy yellow effluent. Pt tolerated procedure well.

## 2023-12-01 NOTE — PROGRESS NOTE ADULT - ATTENDING COMMENTS
nonoliguric ELVI suspect ATN due to sepsis/ hypotension - but other causes possible incl caspofungin , MGUS noted   w/u as above-- await results  match I/O -- change IV to contain hco3 as above  will follow   seems very reasonable to continue  caspofungin for now but may need to consider alternative ( if this is feasible) if creat continues to rise over next week and no alternative cause ELVI found

## 2023-12-01 NOTE — PROGRESS NOTE ADULT - ASSESSMENT
78M w PMH Crohn's, AFib/Flutter s/p DCCVs on amiodarone, remote ileocectomy and open appendectomy. Admitted (6/23) for SBO vs Crohns flare, s/p NGT decompression and s/p lap converted to open TRE, SBR x 3, left in discontinuity with abthera vac on (6/27), RTOR for ileocolic resection, small bowel anastomosis, and abdominal wall closure on (6/28), c/b fluid collection s/p IR aspiration of perihepatic fluid on (7/3), c/b wound dehiscence s/p RTOR exlap, washout, ileocolic resection with end ileostomy, blow hole colostomy, red rubber from ileostomy to small bowel anastomosis; vicryl bridging mesh on (7/5) transferred to SICU postoperatively for hemodynamic monitoring, with hospital course complicated by periods of severe ELVI and hyponatremia, which resolved but stepped back up for to SICU on (9/10) for acute AMS, intermittent hypoglycemia, AFib with RVR. Percutaneous Cholecystostomy placed on (9/11) for enlarged GB, PCT capped 10/5 and uncapped 10/25 for hyperbilirubinemia, Right brachial DVT, left basillic and cephalic superficial thrombus on duplex 11/2, CT 11/14 with ALDEN ground glass opacity of unclear etiology, completed empiric 7day cefepime course 11/22, rising T-bili of unclear etilogy, now w fungemia (candida glabrata) CLABSI, and worsenening renal function.    Renal U/S  trend cre  monitor HTN  per SICU

## 2023-12-01 NOTE — CONSULT NOTE ADULT - SUBJECTIVE AND OBJECTIVE BOX
SURGICAL ONCOLOGY CONSULT NOTE  ==============================================================================================================  HPI:  78M w/ PMHx of Crohn's, HTN, HLD, AFib/Flutter s/p multiple DCCV (on Amio), Gout, PSHx R IHR, ileocecectomy, open appy who initially presented in June for small bowel obstruction 2/2 stricturing Crohn's disease. Patient was trialed with nonoperative management, but was taken to the OR for ex lap, SBR. Post operative course has been long and complicated by fascial dehiscence requiring RTOR and post operative EC fistula causing severe electrolyte derrangements and renal dysfunction requiring prolonged ICU admission for fluid management and TPN requirements as well as and monitoring of large wound. Patient's course has also been complicated by direct hyperbilirubinemia of unclear etiology (possibly 2/2 to TPN, severe cholestasis from sepsis and chronic disease, etc.). Prior episode was managed with percutaneous cholecystectomy tube with improvement of hyperbilirubinemia  Surgery Team 1C now consulted for evaluation of recurrent direct hyperbilirubinemia. Acutely last week, patient's total bilirubin noted to elevated >10 with direct component being >7. Patient's per cony tube has been uncapped with appropriate output daily (400-600). Patient denies symptoms of fevers, chest pain, SOB, nausea, pruritis. Denies pale stools or brad colored urine.      PMHx: HTN, Atrial fibrillation s/p multiple DCCV, Crohn's disease, HLD, Hypothyroidism  PSHx: Ileocecectomy 30 years ago, open appy, H/O knee surgery, History of cataract surgery, ex lap, SBR (June 2023)  Family Hx: Denies family hx of IBS, Crohn's, UC, or colon cancer.  All: penicillin (Angioedema)    Meds: allopurinol 300 mg oral tablet: 1 tab(s) orally once a day  amiodarone 200 mg oral tablet: 1 orally once a day  metoprolol tartrate 50 mg oral tablet: 1 orally 2 times a day  rosuvastatin 5 mg oral tablet: 1 tab(s) orally once a day  Synthroid 50 mcg (0.05 mg) oral tablet: 1 tab(s) orally once a day  venlafaxine 150 mg oral tablet, extended release: 1 tab(s) orally 2 times a day      PAST MEDICAL & SURGICAL HISTORY:  Essential hypertension  Hypertension      Atrial fibrillation  s/p cardioversion 2010 and 2014  Pt. reports 4 DCCV  Now on Amiodarone 200mg PO bid and Eliquis 5mg PO bid  Last DCCV [facility] yrs ago at [facility]      Crohn's disease  s/p partial resection of ileum      Hyperlipidemia      Hypothyroidism      History of depression  On Venlafaxine ER 150mg PO bid      Junctional rhythm      H/O knee surgery      History of cataract surgery        Home Meds: Home Medications:  allopurinol 300 mg oral tablet: 1 tab(s) orally once a day (23 Jun 2023 16:39)  amiodarone 200 mg oral tablet: 1 orally once a day (23 Jun 2023 16:39)  metoprolol tartrate 50 mg oral tablet: 1 orally 2 times a day (23 Jun 2023 16:39)  rosuvastatin 5 mg oral tablet: 1 tab(s) orally once a day (23 Jun 2023 16:39)  venlafaxine 150 mg oral tablet, extended release: 1 tab(s) orally 2 times a day (23 Jun 2023 16:39)    Allergies: Allergies    penicillin (Angioedema)    Intolerances    CURRENT MEDICATIONS:   --------------------------------------------------------------------------------------  Neurologic Medications  LORazepam   Injectable 0.5 milliGRAM(s) IV Push at bedtime PRN Anxiety  ondansetron Injectable 4 milliGRAM(s) IV Push every 8 hours PRN Nausea and/or Vomiting  venlafaxine XR. 150 milliGRAM(s) Oral two times a day    Respiratory Medications    Cardiovascular Medications  hydrALAZINE Injectable 10 milliGRAM(s) IV Push every 8 hours  metoprolol tartrate Injectable 5 milliGRAM(s) IV Push every 6 hours    Gastrointestinal Medications  albumin human 25% IVPB 50 milliLiter(s) IV Intermittent every 8 hours  dextrose 10% + sodium chloride 0.45% 1000 milliLiter(s) IV Continuous <Continuous>  ergocalciferol 59538 Unit(s) Oral <User Schedule>  famotidine Injectable 20 milliGRAM(s) IV Push daily  folic acid Injectable 1 milliGRAM(s) IV Push daily  thiamine Injectable 100 milliGRAM(s) IV Push every 24 hours  ursodiol Suspension 300 milliGRAM(s) Oral every 8 hours    Genitourinary Medications    Hematologic/Oncologic Medications  epoetin matt-epbx (RETACRIT) Injectable 31401 Unit(s) SubCutaneous every 7 days  heparin   Injectable 5000 Unit(s) SubCutaneous every 8 hours    Antimicrobial/Immunologic Medications  caspofungin IVPB 50 milliGRAM(s) IV Intermittent every 24 hours    Endocrine/Metabolic Medications  cholestyramine Powder (Sugar-Free) 4 Gram(s) Oral daily  glucagon  Injectable 1 milliGRAM(s) IntraMuscular once  levothyroxine Injectable 30 MICROGram(s) IV Push at bedtime  octreotide  Injectable 100 MICROGram(s) IV Push three times a day    Topical/Other Medications  chlorhexidine 2% Cloths 1 Application(s) Topical <User Schedule>    --------------------------------------------------------------------------------------    VITAL SIGNS, INS/OUTS (last 24 hours):  --------------------------------------------------------------------------------------  ICU Vital Signs Last 24 Hrs  T(C): 36.6 (01 Dec 2023 09:00), Max: 36.6 (01 Dec 2023 01:15)  T(F): 97.8 (01 Dec 2023 09:00), Max: 97.9 (01 Dec 2023 01:15)  HR: 89 (01 Dec 2023 17:00) (84 - 89)  BP: 156/91 (01 Dec 2023 14:00) (121/60 - 159/73)  BP(mean): 117 (01 Dec 2023 14:00) (84 - 117)  RR: 27 (01 Dec 2023 17:00) (13 - 29)  SpO2: 100% (01 Dec 2023 17:00) (93% - 100%)    O2 Parameters below as of 01 Dec 2023 14:00  Patient On (Oxygen Delivery Method): room air          I&O's Summary    30 Nov 2023 07:01  -  01 Dec 2023 07:00  --------------------------------------------------------  IN: 4460 mL / OUT: 4575 mL / NET: -115 mL    01 Dec 2023 07:01  -  01 Dec 2023 17:32  --------------------------------------------------------  IN: 1240 mL / OUT: 1595 mL / NET: -355 mL      --------------------------------------------------------------------------------------    EXAM:  Gen: Resting in bed, NAD, icteric skin noted  Neuro: A&OX3 No deficits  CV: NSR  Pulm:  No respiratory distress, saturating well on RA  ABD: Soft, ND, NT, no rebound tenderness, no guarding, abd soft, PCT bilious output, ECF/ileostomy w wound manager in place w brown liquid output. mucus fistula with red rubber in place with minimal serous output.   Ext: No calf pain noted. WWP. Mild B/L LE edema appreciated  Psych: Normal affect    LABS  --------------------------------------------------------------------------------------             8.5    9.98  )-----------( 211      ( 01 Dec 2023 04:29 )             26.3         12-01    136  |  110<H>  |  33<H>  ----------------------------<  122<H>  4.3   |  14<L>  |  2.89<H>      Calcium: 9.0 mg/dL (12-01-23 @ 04:29)      LFTs:             8.3  | 7.6  | 96       ------------------[234     ( 01 Dec 2023 04:29 )  2.5  | 5.1  | 71                   Coags:     13.9   ----< 1.23    ( 01 Dec 2023 04:29 )     38.4        CARDIAC MARKERS ( 30 Nov 2023 04:18 )  x     / x     / 27 U/L / x     / x              Urinalysis Basic - ( 01 Dec 2023 04:29 )    Color: x / Appearance: x / SG: x / pH: x  Gluc: 122 mg/dL / Ketone: x  / Bili: x / Urobili: x   Blood: x / Protein: x / Nitrite: x   Leuk Esterase: x / RBC: x / WBC x   Sq Epi: x / Non Sq Epi: x / Bacteria: x    --------------------------------------------------------------------------------------  IMAGING RESULTS    PROCEDURE DATE:  10/30/2023          INTERPRETATION:  CLINICAL INFORMATION: Hyperbilirubinemia; cholestasis;   percutaneous cholecystostomy tube    COMPARISON: 9/19/2023 CT    CONTRAST/COMPLICATIONS:  IV Contrast: Gadavist  7 cc administered   3 cc discarded  Oral Contrast: NONE  Complications: None reported at time of study completion    PROCEDURE:  MRI/MRCP was performed. Radial and 3D MRCP sequences were obtained.      FINDINGS:  LOWER CHEST: Small to moderate right pleural effusion with right basilar   focal atelectasis, not significant changed. Improved appearance of left   lower lobe with resolution of left lower lobe focal atelectasis and near   complete resolution ofa left pleural effusion.    LIVER: Scattered, nonenhancing, T2 hyperintense, T1 hypointense intense   foci. The largest is seen in the posterior aspect of the right hepatic   lobe, measuring 1.9 cm. This is compatible with a small cyst. The others   are too small to definitely characterize but are likely small cysts.  BILE DUCTS: Normal caliber.  GALLBLADDER: Decompressed by a cholecystostomy tube in unremarkable   position. No right upper quadrant fluid collections.  SPLEEN: Within normal limits.  PANCREAS: Atrophic. 3 T2 hyperintense, T1 hypointense, nonenhancing foci   are seen in the pancreas. One is seen in the pancreatic tail measuring 9   x 13 mm. Another is seen in the pancreatic neck measuring 11 x 12 mm. A   10 mm cystic focus is seen in the head of the pancreas (6:31). These are   composed of microcysts. There is diffuse mild dilatation of the   pancreatic duct in the body and tail of the pancreas.  ADRENALS: Within normal limits.  KIDNEYS/URETERS: No renal collecting system obstruction. Scattered   bilateral renal cysts.    VISUALIZED PORTIONS:  BOWEL: 2.7 cm duodenal diverticulum extending from the second portion of   the duodenum. No bowel obstruction.  PERITONEUM: No ascites.  VESSELS: Within normal limits.  RETROPERITONEUM/LYMPH NODES: No lymphadenopathy.  ABDOMINAL WALL: Midline ostomy noted anteriorly.  BONES: No suspicious osseous lesions    IMPRESSION:  1.  Cholecystostomy tube in unremarkable position within the gallbladder   which is decompressed  2.  No biliary ductal dilatation.  3.  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 These are   generally thought to be benign, however, recommend annual MRI follow-up   for assessment of stability. Diffuse mild pancreatic ductal dilatation.      ACC: [id number] EXAM:  US DPLX ABDOMEN   ORDERED BY: GUMARO PATHAK     PROCEDURE DATE:  12/01/2023          INTERPRETATION:  ABDOMINAL DUPLEX ULTRASOUND    INDICATION: Transaminitis.    TECHNIQUE: Duplex Doppler evaluation including gray-scale ultrasound   imaging, color flow Doppler imaging, and Doppler spectral analysis of the   abdominal vessels was performed.    COMPARISON: Duplex abdominal ultrasound 11/25/23. CT 11/1/2023.    FINDINGS:  Examination is limited by bowel gas and overlying dressings.  The portosplenic confluence is not visualized.    Focused ultrasound examination of the abdominal vessels demonstrates the   IVC, hepatic and portal veins, and splenic vein are patent with normal   directionality of flow.    The aorta and hepatic arteries are patent.    Limited evaluation of the liver parenchyma demonstrates no focal lesion.   Normal contour.    IMPRESSION:  Patent portal and hepatic veins.    ACC: [id number] EXAM:  US ABDOMEN RT UPR QUADRANT   ORDERED BY: OPAL ARENAS     PROCEDURE DATE:  11/25/2023          INTERPRETATION:  CLINICAL INFORMATION: Elevated T bilirubin.    COMPARISON: CT 11/1/2023.    TECHNIQUE: Sonography of the right upper quadrant. Color and spectral   Doppler evaluation of the hepatic vasculature was also performed.    FINDINGS: Overall examination is suboptimal due to patient's clinical   condition andoverlying bandages/dressings.  Liver: Within normal limits.  Bile ducts: Normal caliber. Common bile duct measures 5 mm.  Gallbladder: Contracted around a cholecystostomy catheter.  Pancreas: Not visualized.  Right kidney: 12.0 cm. No hydronephrosis.A 3.3 cm simple cyst.  Ascites: None.  Vasculature: IVC and aorta are not well visualized. Hepatic veins are   patent. Main and the right portal veins are patent. Hepatic artery is   patent with normal low resistance arterial waveform.  Small right pleural effusion.    IMPRESSION: Overall suboptimal exam.  Visualized hepatic vasculature is patent.       SURGICAL ONCOLOGY CONSULT NOTE  ==============================================================================================================  HPI:  78M w/ PMHx of Crohn's, HTN, HLD, AFib/Flutter s/p multiple DCCV (on Amio), Gout, PSHx R IHR, ileocecectomy, open appy who initially presented in June for small bowel obstruction 2/2 stricturing Crohn's disease. Patient was trialed with nonoperative management, but was taken to the OR for ex lap, SBR. Post operative course has been long and complicated by fascial dehiscence requiring RTOR and post operative EC fistula causing severe electrolyte derrangements and renal dysfunction requiring prolonged ICU admission for fluid management and TPN requirements as well as and monitoring of large wound. Patient's course has also been complicated by direct hyperbilirubinemia of unclear etiology (possibly 2/2 to TPN, severe cholestasis from sepsis and chronic disease, etc.). Prior episode was managed with percutaneous cholecystectomy tube with improvement of hyperbilirubinemia  Surgery Team 1C now consulted for evaluation of recurrent direct hyperbilirubinemia. Acutely last week, patient's total bilirubin noted to elevated >10 with direct component being >7. Patient's per cony tube has been uncapped with appropriate output daily (400-600). Patient denies symptoms of fevers, chest pain, SOB, nausea, pruritis. Denies pale stools or brad colored urine.      PMHx: HTN, Atrial fibrillation s/p multiple DCCV, Crohn's disease, HLD, Hypothyroidism  PSHx: Ileocecectomy 30 years ago, open appy, H/O knee surgery, History of cataract surgery, ex lap, SBR (June 2023)  Family Hx: Denies family hx of IBS, Crohn's, UC, or colon cancer.  All: penicillin (Angioedema)    Meds: allopurinol 300 mg oral tablet: 1 tab(s) orally once a day  amiodarone 200 mg oral tablet: 1 orally once a day  metoprolol tartrate 50 mg oral tablet: 1 orally 2 times a day  rosuvastatin 5 mg oral tablet: 1 tab(s) orally once a day  Synthroid 50 mcg (0.05 mg) oral tablet: 1 tab(s) orally once a day  venlafaxine 150 mg oral tablet, extended release: 1 tab(s) orally 2 times a day      PAST MEDICAL & SURGICAL HISTORY:  Essential hypertension  Hypertension      Atrial fibrillation  s/p cardioversion 2010 and 2014  Pt. reports 4 DCCV  Now on Amiodarone 200mg PO bid and Eliquis 5mg PO bid  Last DCCV 4 yrs ago at [facility]      Crohn's disease  s/p partial resection of ileum      Hyperlipidemia      Hypothyroidism      History of depression  On Venlafaxine ER 150mg PO bid      Junctional rhythm      H/O knee surgery      History of cataract surgery        Home Meds: Home Medications:  allopurinol 300 mg oral tablet: 1 tab(s) orally once a day (23 Jun 2023 16:39)  amiodarone 200 mg oral tablet: 1 orally once a day (23 Jun 2023 16:39)  metoprolol tartrate 50 mg oral tablet: 1 orally 2 times a day (23 Jun 2023 16:39)  rosuvastatin 5 mg oral tablet: 1 tab(s) orally once a day (23 Jun 2023 16:39)  venlafaxine 150 mg oral tablet, extended release: 1 tab(s) orally 2 times a day (23 Jun 2023 16:39)    Allergies: Allergies    penicillin (Angioedema)    Intolerances    CURRENT MEDICATIONS:   --------------------------------------------------------------------------------------  Neurologic Medications  LORazepam   Injectable 0.5 milliGRAM(s) IV Push at bedtime PRN Anxiety  ondansetron Injectable 4 milliGRAM(s) IV Push every 8 hours PRN Nausea and/or Vomiting  venlafaxine XR. 150 milliGRAM(s) Oral two times a day    Respiratory Medications    Cardiovascular Medications  hydrALAZINE Injectable 10 milliGRAM(s) IV Push every 8 hours  metoprolol tartrate Injectable 5 milliGRAM(s) IV Push every 6 hours    Gastrointestinal Medications  albumin human 25% IVPB 50 milliLiter(s) IV Intermittent every 8 hours  dextrose 10% + sodium chloride 0.45% 1000 milliLiter(s) IV Continuous <Continuous>  ergocalciferol 06464 Unit(s) Oral <User Schedule>  famotidine Injectable 20 milliGRAM(s) IV Push daily  folic acid Injectable 1 milliGRAM(s) IV Push daily  thiamine Injectable 100 milliGRAM(s) IV Push every 24 hours  ursodiol Suspension 300 milliGRAM(s) Oral every 8 hours    Genitourinary Medications    Hematologic/Oncologic Medications  epoetin matt-epbx (RETACRIT) Injectable 67035 Unit(s) SubCutaneous every 7 days  heparin   Injectable 5000 Unit(s) SubCutaneous every 8 hours    Antimicrobial/Immunologic Medications  caspofungin IVPB 50 milliGRAM(s) IV Intermittent every 24 hours    Endocrine/Metabolic Medications  cholestyramine Powder (Sugar-Free) 4 Gram(s) Oral daily  glucagon  Injectable 1 milliGRAM(s) IntraMuscular once  levothyroxine Injectable 30 MICROGram(s) IV Push at bedtime  octreotide  Injectable 100 MICROGram(s) IV Push three times a day    Topical/Other Medications  chlorhexidine 2% Cloths 1 Application(s) Topical <User Schedule>    --------------------------------------------------------------------------------------    VITAL SIGNS, INS/OUTS (last 24 hours):  --------------------------------------------------------------------------------------  ICU Vital Signs Last 24 Hrs  T(C): 36.6 (01 Dec 2023 09:00), Max: 36.6 (01 Dec 2023 01:15)  T(F): 97.8 (01 Dec 2023 09:00), Max: 97.9 (01 Dec 2023 01:15)  HR: 89 (01 Dec 2023 17:00) (84 - 89)  BP: 156/91 (01 Dec 2023 14:00) (121/60 - 159/73)  BP(mean): 117 (01 Dec 2023 14:00) (84 - 117)  RR: 27 (01 Dec 2023 17:00) (13 - 29)  SpO2: 100% (01 Dec 2023 17:00) (93% - 100%)    O2 Parameters below as of 01 Dec 2023 14:00  Patient On (Oxygen Delivery Method): room air          I&O's Summary    30 Nov 2023 07:01  -  01 Dec 2023 07:00  --------------------------------------------------------  IN: 4460 mL / OUT: 4575 mL / NET: -115 mL    01 Dec 2023 07:01  -  01 Dec 2023 17:32  --------------------------------------------------------  IN: 1240 mL / OUT: 1595 mL / NET: -355 mL      --------------------------------------------------------------------------------------    EXAM:  Gen: Resting in bed, NAD, icteric skin noted  Neuro: A&OX3 No deficits  CV: NSR  Pulm:  No respiratory distress, saturating well on RA  ABD: Soft, ND, NT, no rebound tenderness, no guarding, abd soft, PCT bilious output, ECF/ileostomy w wound manager in place w brown liquid output. mucus fistula with red rubber in place with minimal serous output.   Ext: No calf pain noted. WWP. Mild B/L LE edema appreciated  Psych: Normal affect    LABS  --------------------------------------------------------------------------------------             8.5    9.98  )-----------( 211      ( 01 Dec 2023 04:29 )             26.3         12-01    136  |  110<H>  |  33<H>  ----------------------------<  122<H>  4.3   |  14<L>  |  2.89<H>      Calcium: 9.0 mg/dL (12-01-23 @ 04:29)      LFTs:             8.3  | 7.6  | 96       ------------------[234     ( 01 Dec 2023 04:29 )  2.5  | 5.1  | 71                   Coags:     13.9   ----< 1.23    ( 01 Dec 2023 04:29 )     38.4        CARDIAC MARKERS ( 30 Nov 2023 04:18 )  x     / x     / 27 U/L / x     / x              Urinalysis Basic - ( 01 Dec 2023 04:29 )    Color: x / Appearance: x / SG: x / pH: x  Gluc: 122 mg/dL / Ketone: x  / Bili: x / Urobili: x   Blood: x / Protein: x / Nitrite: x   Leuk Esterase: x / RBC: x / WBC x   Sq Epi: x / Non Sq Epi: x / Bacteria: x    --------------------------------------------------------------------------------------  IMAGING RESULTS    PROCEDURE DATE:  10/30/2023          INTERPRETATION:  CLINICAL INFORMATION: Hyperbilirubinemia; cholestasis;   percutaneous cholecystostomy tube    COMPARISON: 9/19/2023 CT    CONTRAST/COMPLICATIONS:  IV Contrast: Gadavist  7 cc administered   3 cc discarded  Oral Contrast: NONE  Complications: None reported at time of study completion    PROCEDURE:  MRI/MRCP was performed. Radial and 3D MRCP sequences were obtained.      FINDINGS:  LOWER CHEST: Small to moderate right pleural effusion with right basilar   focal atelectasis, not significant changed. Improved appearance of left   lower lobe with resolution of left lower lobe focal atelectasis and near   complete resolution ofa left pleural effusion.    LIVER: Scattered, nonenhancing, T2 hyperintense, T1 hypointense intense   foci. The largest is seen in the posterior aspect of the right hepatic   lobe, measuring 1.9 cm. This is compatible with a small cyst. The others   are too small to definitely characterize but are likely small cysts.  BILE DUCTS: Normal caliber.  GALLBLADDER: Decompressed by a cholecystostomy tube in unremarkable   position. No right upper quadrant fluid collections.  SPLEEN: Within normal limits.  PANCREAS: Atrophic. 3 T2 hyperintense, T1 hypointense, nonenhancing foci   are seen in the pancreas. One is seen in the pancreatic tail measuring 9   x 13 mm. Another is seen in the pancreatic neck measuring 11 x 12 mm. A   10 mm cystic focus is seen in the head of the pancreas (6:31). These are   composed of microcysts. There is diffuse mild dilatation of the   pancreatic duct in the body and tail of the pancreas.  ADRENALS: Within normal limits.  KIDNEYS/URETERS: No renal collecting system obstruction. Scattered   bilateral renal cysts.    VISUALIZED PORTIONS:  BOWEL: 2.7 cm duodenal diverticulum extending from the second portion of   the duodenum. No bowel obstruction.  PERITONEUM: No ascites.  VESSELS: Within normal limits.  RETROPERITONEUM/LYMPH NODES: No lymphadenopathy.  ABDOMINAL WALL: Midline ostomy noted anteriorly.  BONES: No suspicious osseous lesions    IMPRESSION:  1.  Cholecystostomy tube in unremarkable position within the gallbladder   which is decompressed  2.  No biliary ductal dilatation.  3.  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 These are   generally thought to be benign, however, recommend annual MRI follow-up   for assessment of stability. Diffuse mild pancreatic ductal dilatation.      ACC: [id number] EXAM:  US DPLX ABDOMEN   ORDERED BY: GUMARO PATHAK     PROCEDURE DATE:  12/01/2023          INTERPRETATION:  ABDOMINAL DUPLEX ULTRASOUND    INDICATION: Transaminitis.    TECHNIQUE: Duplex Doppler evaluation including gray-scale ultrasound   imaging, color flow Doppler imaging, and Doppler spectral analysis of the   abdominal vessels was performed.    COMPARISON: Duplex abdominal ultrasound 11/25/23. CT 11/1/2023.    FINDINGS:  Examination is limited by bowel gas and overlying dressings.  The portosplenic confluence is not visualized.    Focused ultrasound examination of the abdominal vessels demonstrates the   IVC, hepatic and portal veins, and splenic vein are patent with normal   directionality of flow.    The aorta and hepatic arteries are patent.    Limited evaluation of the liver parenchyma demonstrates no focal lesion.   Normal contour.    IMPRESSION:  Patent portal and hepatic veins.    ACC: [id number] EXAM:  US ABDOMEN RT UPR QUADRANT   ORDERED BY: OPAL ARENAS     PROCEDURE DATE:  11/25/2023          INTERPRETATION:  CLINICAL INFORMATION: Elevated T bilirubin.    COMPARISON: CT 11/1/2023.    TECHNIQUE: Sonography of the right upper quadrant. Color and spectral   Doppler evaluation of the hepatic vasculature was also performed.    FINDINGS: Overall examination is suboptimal due to patient's clinical   condition andoverlying bandages/dressings.  Liver: Within normal limits.  Bile ducts: Normal caliber. Common bile duct measures 5 mm.  Gallbladder: Contracted around a cholecystostomy catheter.  Pancreas: Not visualized.  Right kidney: 12.0 cm. No hydronephrosis.A 3.3 cm simple cyst.  Ascites: None.  Vasculature: IVC and aorta are not well visualized. Hepatic veins are   patent. Main and the right portal veins are patent. Hepatic artery is   patent with normal low resistance arterial waveform.  Small right pleural effusion.    IMPRESSION: Overall suboptimal exam.  Visualized hepatic vasculature is patent.       SURGICAL ONCOLOGY CONSULT NOTE  ==============================================================================================================  HPI:  78M w/ PMHx of Crohn's, HTN, HLD, AFib/Flutter s/p multiple DCCV (on Amio), Gout, PSHx R IHR, ileocecectomy, open appy who initially presented in June for small bowel obstruction 2/2 stricturing Crohn's disease. Patient was trialed with nonoperative management, but was taken to the OR for ex lap, SBR. Post operative course has been long and complicated by fascial dehiscence requiring RTOR and post operative EC fistula causing severe electrolyte derrangements and renal dysfunction requiring prolonged ICU admission for fluid management and TPN requirements as well as and monitoring of large wound. Patient's course has also been complicated by direct hyperbilirubinemia of unclear etiology (possibly 2/2 to TPN, severe cholestasis from sepsis and chronic disease, etc.). Prior episode was managed with percutaneous cholecystectomy tube with improvement of hyperbilirubinemia  Surgery Team 1C now consulted for evaluation of recurrent direct hyperbilirubinemia. Acutely last week, patient's total bilirubin noted to elevated >10 with direct component being >7. Patient's per cony tube has been uncapped with appropriate output daily (400-600). Patient denies symptoms of fevers, chest pain, SOB, nausea, pruritis. Denies pale stools or brad colored urine.      PMHx: HTN, Atrial fibrillation s/p multiple DCCV, Crohn's disease, HLD, Hypothyroidism  PSHx: Ileocecectomy 30 years ago, open appy, H/O knee surgery, History of cataract surgery, ex lap, SBR (June 2023)  Family Hx: Denies family hx of IBS, Crohn's, UC, or colon cancer.  All: penicillin (Angioedema)    Meds: allopurinol 300 mg oral tablet: 1 tab(s) orally once a day  amiodarone 200 mg oral tablet: 1 orally once a day  metoprolol tartrate 50 mg oral tablet: 1 orally 2 times a day  rosuvastatin 5 mg oral tablet: 1 tab(s) orally once a day  Synthroid 50 mcg (0.05 mg) oral tablet: 1 tab(s) orally once a day  venlafaxine 150 mg oral tablet, extended release: 1 tab(s) orally 2 times a day      PAST MEDICAL & SURGICAL HISTORY:  Essential hypertension  Hypertension      Atrial fibrillation  s/p cardioversion 2010 and 2014  Pt. reports 4 DCCV  Now on Amiodarone 200mg PO bid and Eliquis 5mg PO bid  Last DCCV 4 yrs ago at [facility]      Crohn's disease  s/p partial resection of ileum      Hyperlipidemia      Hypothyroidism      History of depression  On Venlafaxine ER 150mg PO bid      Junctional rhythm      H/O knee surgery      History of cataract surgery        Home Meds: Home Medications:  allopurinol 300 mg oral tablet: 1 tab(s) orally once a day (23 Jun 2023 16:39)  amiodarone 200 mg oral tablet: 1 orally once a day (23 Jun 2023 16:39)  metoprolol tartrate 50 mg oral tablet: 1 orally 2 times a day (23 Jun 2023 16:39)  rosuvastatin 5 mg oral tablet: 1 tab(s) orally once a day (23 Jun 2023 16:39)  venlafaxine 150 mg oral tablet, extended release: 1 tab(s) orally 2 times a day (23 Jun 2023 16:39)    Allergies: Allergies    penicillin (Angioedema)    Intolerances    CURRENT MEDICATIONS:   --------------------------------------------------------------------------------------  Neurologic Medications  LORazepam   Injectable 0.5 milliGRAM(s) IV Push at bedtime PRN Anxiety  ondansetron Injectable 4 milliGRAM(s) IV Push every 8 hours PRN Nausea and/or Vomiting  venlafaxine XR. 150 milliGRAM(s) Oral two times a day    Respiratory Medications    Cardiovascular Medications  hydrALAZINE Injectable 10 milliGRAM(s) IV Push every 8 hours  metoprolol tartrate Injectable 5 milliGRAM(s) IV Push every 6 hours    Gastrointestinal Medications  albumin human 25% IVPB 50 milliLiter(s) IV Intermittent every 8 hours  dextrose 10% + sodium chloride 0.45% 1000 milliLiter(s) IV Continuous <Continuous>  ergocalciferol 80491 Unit(s) Oral <User Schedule>  famotidine Injectable 20 milliGRAM(s) IV Push daily  folic acid Injectable 1 milliGRAM(s) IV Push daily  thiamine Injectable 100 milliGRAM(s) IV Push every 24 hours  ursodiol Suspension 300 milliGRAM(s) Oral every 8 hours    Genitourinary Medications    Hematologic/Oncologic Medications  epoetin matt-epbx (RETACRIT) Injectable 27516 Unit(s) SubCutaneous every 7 days  heparin   Injectable 5000 Unit(s) SubCutaneous every 8 hours    Antimicrobial/Immunologic Medications  caspofungin IVPB 50 milliGRAM(s) IV Intermittent every 24 hours    Endocrine/Metabolic Medications  cholestyramine Powder (Sugar-Free) 4 Gram(s) Oral daily  glucagon  Injectable 1 milliGRAM(s) IntraMuscular once  levothyroxine Injectable 30 MICROGram(s) IV Push at bedtime  octreotide  Injectable 100 MICROGram(s) IV Push three times a day    Topical/Other Medications  chlorhexidine 2% Cloths 1 Application(s) Topical <User Schedule>    --------------------------------------------------------------------------------------    VITAL SIGNS, INS/OUTS (last 24 hours):  --------------------------------------------------------------------------------------  ICU Vital Signs Last 24 Hrs  T(C): 36.6 (01 Dec 2023 09:00), Max: 36.6 (01 Dec 2023 01:15)  T(F): 97.8 (01 Dec 2023 09:00), Max: 97.9 (01 Dec 2023 01:15)  HR: 89 (01 Dec 2023 17:00) (84 - 89)  BP: 156/91 (01 Dec 2023 14:00) (121/60 - 159/73)  BP(mean): 117 (01 Dec 2023 14:00) (84 - 117)  RR: 27 (01 Dec 2023 17:00) (13 - 29)  SpO2: 100% (01 Dec 2023 17:00) (93% - 100%)    O2 Parameters below as of 01 Dec 2023 14:00  Patient On (Oxygen Delivery Method): room air          I&O's Summary    30 Nov 2023 07:01  -  01 Dec 2023 07:00  --------------------------------------------------------  IN: 4460 mL / OUT: 4575 mL / NET: -115 mL    01 Dec 2023 07:01  -  01 Dec 2023 17:32  --------------------------------------------------------  IN: 1240 mL / OUT: 1595 mL / NET: -355 mL      --------------------------------------------------------------------------------------    EXAM:  Gen: Resting in bed, NAD, icteric skin noted  Neuro: A&OX3 No deficits  CV: NSR  Pulm:  No respiratory distress, saturating well on RA  ABD: Soft, ND, NT, no rebound tenderness, no guarding, abd soft, PCT bilious output, ECF/ileostomy w wound manager in place w brown liquid output. mucus fistula with red rubber in place with minimal serous output.   Ext: No calf pain noted. WWP. Mild B/L LE edema appreciated  Psych: Normal affect    LABS  --------------------------------------------------------------------------------------             8.5    9.98  )-----------( 211      ( 01 Dec 2023 04:29 )             26.3         12-01    136  |  110<H>  |  33<H>  ----------------------------<  122<H>  4.3   |  14<L>  |  2.89<H>      Calcium: 9.0 mg/dL (12-01-23 @ 04:29)      LFTs:             8.3  | 7.6  | 96       ------------------[234     ( 01 Dec 2023 04:29 )  2.5  | 5.1  | 71                   Coags:     13.9   ----< 1.23    ( 01 Dec 2023 04:29 )     38.4        CARDIAC MARKERS ( 30 Nov 2023 04:18 )  x     / x     / 27 U/L / x     / x              Urinalysis Basic - ( 01 Dec 2023 04:29 )    Color: x / Appearance: x / SG: x / pH: x  Gluc: 122 mg/dL / Ketone: x  / Bili: x / Urobili: x   Blood: x / Protein: x / Nitrite: x   Leuk Esterase: x / RBC: x / WBC x   Sq Epi: x / Non Sq Epi: x / Bacteria: x    --------------------------------------------------------------------------------------  IMAGING RESULTS    PROCEDURE DATE:  10/30/2023          INTERPRETATION:  CLINICAL INFORMATION: Hyperbilirubinemia; cholestasis;   percutaneous cholecystostomy tube    COMPARISON: 9/19/2023 CT    CONTRAST/COMPLICATIONS:  IV Contrast: Gadavist  7 cc administered   3 cc discarded  Oral Contrast: NONE  Complications: None reported at time of study completion    PROCEDURE:  MRI/MRCP was performed. Radial and 3D MRCP sequences were obtained.      FINDINGS:  LOWER CHEST: Small to moderate right pleural effusion with right basilar   focal atelectasis, not significant changed. Improved appearance of left   lower lobe with resolution of left lower lobe focal atelectasis and near   complete resolution ofa left pleural effusion.    LIVER: Scattered, nonenhancing, T2 hyperintense, T1 hypointense intense   foci. The largest is seen in the posterior aspect of the right hepatic   lobe, measuring 1.9 cm. This is compatible with a small cyst. The others   are too small to definitely characterize but are likely small cysts.  BILE DUCTS: Normal caliber.  GALLBLADDER: Decompressed by a cholecystostomy tube in unremarkable   position. No right upper quadrant fluid collections.  SPLEEN: Within normal limits.  PANCREAS: Atrophic. 3 T2 hyperintense, T1 hypointense, nonenhancing foci   are seen in the pancreas. One is seen in the pancreatic tail measuring 9   x 13 mm. Another is seen in the pancreatic neck measuring 11 x 12 mm. A   10 mm cystic focus is seen in the head of the pancreas (6:31). These are   composed of microcysts. There is diffuse mild dilatation of the   pancreatic duct in the body and tail of the pancreas.  ADRENALS: Within normal limits.  KIDNEYS/URETERS: No renal collecting system obstruction. Scattered   bilateral renal cysts.    VISUALIZED PORTIONS:  BOWEL: 2.7 cm duodenal diverticulum extending from the second portion of   the duodenum. No bowel obstruction.  PERITONEUM: No ascites.  VESSELS: Within normal limits.  RETROPERITONEUM/LYMPH NODES: No lymphadenopathy.  ABDOMINAL WALL: Midline ostomy noted anteriorly.  BONES: No suspicious osseous lesions    IMPRESSION:  1.  Cholecystostomy tube in unremarkable position within the gallbladder   which is decompressed  2.  No biliary ductal dilatation.  3.  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 These are   generally thought to be benign, however, recommend annual MRI follow-up   for assessment of stability. Diffuse mild pancreatic ductal dilatation.      ACC: [id number] EXAM:  US DPLX ABDOMEN   ORDERED BY: GUMARO PATHAK     PROCEDURE DATE:  12/01/2023          INTERPRETATION:  ABDOMINAL DUPLEX ULTRASOUND    INDICATION: Transaminitis.    TECHNIQUE: Duplex Doppler evaluation including gray-scale ultrasound   imaging, color flow Doppler imaging, and Doppler spectral analysis of the   abdominal vessels was performed.    COMPARISON: Duplex abdominal ultrasound 11/25/23. CT 11/1/2023.    FINDINGS:  Examination is limited by bowel gas and overlying dressings.  The portosplenic confluence is not visualized.    Focused ultrasound examination of the abdominal vessels demonstrates the   IVC, hepatic and portal veins, and splenic vein are patent with normal   directionality of flow.    The aorta and hepatic arteries are patent.    Limited evaluation of the liver parenchyma demonstrates no focal lesion.   Normal contour.    IMPRESSION:  Patent portal and hepatic veins.    ACC: [id number] EXAM:  US ABDOMEN RT UPR QUADRANT   ORDERED BY: OPAL ARENAS     PROCEDURE DATE:  11/25/2023          INTERPRETATION:  CLINICAL INFORMATION: Elevated T bilirubin.    COMPARISON: CT 11/1/2023.    TECHNIQUE: Sonography of the right upper quadrant. Color and spectral   Doppler evaluation of the hepatic vasculature was also performed.    FINDINGS: Overall examination is suboptimal due to patient's clinical   condition andoverlying bandages/dressings.  Liver: Within normal limits.  Bile ducts: Normal caliber. Common bile duct measures 5 mm.  Gallbladder: Contracted around a cholecystostomy catheter.  Pancreas: Not visualized.  Right kidney: 12.0 cm. No hydronephrosis.A 3.3 cm simple cyst.  Ascites: None.  Vasculature: IVC and aorta are not well visualized. Hepatic veins are   patent. Main and the right portal veins are patent. Hepatic artery is   patent with normal low resistance arterial waveform.  Small right pleural effusion.    IMPRESSION: Overall suboptimal exam.  Visualized hepatic vasculature is patent.

## 2023-12-01 NOTE — PROGRESS NOTE ADULT - SUBJECTIVE AND OBJECTIVE BOX
Nephrology progress note    Seen at bedside. Resting comfortably in bed. No complaints.    Allergies:  penicillin (Angioedema)    Hospital Medications:   MEDICATIONS  (STANDING):  albumin human 25% IVPB 50 milliLiter(s) IV Intermittent every 8 hours  caspofungin IVPB 50 milliGRAM(s) IV Intermittent every 24 hours  chlorhexidine 2% Cloths 1 Application(s) Topical <User Schedule>  cholestyramine Powder (Sugar-Free) 4 Gram(s) Oral daily  dextrose 10% + sodium chloride 0.9%. 1000 milliLiter(s) (100 mL/Hr) IV Continuous <Continuous>  epoetin matt-epbx (RETACRIT) Injectable 69192 Unit(s) SubCutaneous every 7 days  ergocalciferol 36088 Unit(s) Oral <User Schedule>  folic acid Injectable 1 milliGRAM(s) IV Push daily  glucagon  Injectable 1 milliGRAM(s) IntraMuscular once  heparin   Injectable 5000 Unit(s) SubCutaneous every 8 hours  hydrALAZINE Injectable 10 milliGRAM(s) IV Push every 8 hours  levothyroxine Injectable 30 MICROGram(s) IV Push at bedtime  metoprolol tartrate Injectable 5 milliGRAM(s) IV Push every 6 hours  octreotide  Injectable 100 MICROGram(s) IV Push three times a day  thiamine Injectable 100 milliGRAM(s) IV Push every 24 hours  ursodiol Suspension 300 milliGRAM(s) Oral every 8 hours  venlafaxine XR. 150 milliGRAM(s) Oral two times a day    REVIEW OF SYSTEMS:  All other review of systems is negative unless indicated above.    VITALS:  T(F): 97.8 (12-01-23 @ 09:00), Max: 97.9 (12-01-23 @ 01:15)  HR: 87 (12-01-23 @ 11:00)  BP: 122/58 (12-01-23 @ 11:00)  RR: 15 (12-01-23 @ 11:00)  SpO2: 96% (12-01-23 @ 11:00)  Wt(kg): --    11-29 @ 07:01  -  11-30 @ 07:00  --------------------------------------------------------  IN: 3790 mL / OUT: 4130 mL / NET: -340 mL    11-30 @ 07:01 - 12-01 @ 07:00  --------------------------------------------------------  IN: 4460 mL / OUT: 4575 mL / NET: -115 mL    12-01 @ 07:01 - 12-01 @ 12:52  --------------------------------------------------------  IN: 440 mL / OUT: 525 mL / NET: -85 mL        PHYSICAL EXAM:  Constitutional: NAD, lying in bed  HEENT: NCAT, EOMI  Respiratory: CTAB, no crackles  Cardiovascular: regular rate  Gastrointestinal: abdominal wound noted with conrado pouch, PCT noted  Extremities: 1-2+ LE edema  Neurological: Awake, alert, moving all extremities    LABS:  12-01    136  |  110<H>  |  33<H>  ----------------------------<  122<H>  4.3   |  14<L>  |  2.89<H>    Ca    9.0      01 Dec 2023 04:29  Phos  3.8     12-01  Mg     2.1     12-01    TPro  8.3  /  Alb  2.5<L>  /  TBili  7.6<H>  /  DBili  5.1<H>  /  AST  96<H>  /  ALT  71<H>  /  AlkPhos  234<H>  12-01                          8.5    9.98  )-----------( 211      ( 01 Dec 2023 04:29 )             26.3       Urine Studies:  Creatinine Trend: 2.89<--, 2.78<--, 2.62<--, 2.58<--, 2.14<--, 2.00<--  Urinalysis Basic - ( 01 Dec 2023 04:29 )    Color:  / Appearance:  / SG:  / pH:   Gluc: 122 mg/dL / Ketone:   / Bili:  / Urobili:    Blood:  / Protein:  / Nitrite:    Leuk Esterase:  / RBC:  / WBC    Sq Epi:  / Non Sq Epi:  / Bacteria:       Sodium, Random Urine: 118 mmol/L (11-30 @ 05:07)  Creatinine, Random Urine: 34 mg/dL (11-30 @ 05:07)  Protein/Creatinine Ratio Calculation: 0.8 Ratio (11-30 @ 05:07)  Osmolality, Random Urine: 344 mosm/kg (11-30 @ 05:07)  Potassium, Random Urine: 8 mmol/L (11-30 @ 05:07)  Sodium, Random Urine: 48 mmol/L (11-25 @ 08:26)  Creatinine, Random Urine: 54 mg/dL (11-25 @ 08:26)  Protein/Creatinine Ratio Calculation: 2.3 Ratio (11-25 @ 08:26)  Osmolality, Random Urine: 338 mosm/kg (11-25 @ 08:26)  Potassium, Random Urine: 23 mmol/L (11-25 @ 08:26)    RADIOLOGY & ADDITIONAL STUDIES:  Reviewed

## 2023-12-01 NOTE — PROGRESS NOTE ADULT - NS ATTEND AMEND GEN_ALL_CORE FT
Patient reports he is feeling well  sitting in chair eating  cr continues to rise, in the setting of soft pressures a few days ago and fungemia, sent GN labs for further work up per renal  ultrasound to be done today    Decision-making high complexity

## 2023-12-01 NOTE — CHART NOTE - NSCHARTNOTEFT_GEN_A_CORE
Admitting Diagnosis:   Patient is a 78y old  Male who presents with a chief complaint of sbo (01 Dec 2023 07:22)      PAST MEDICAL & SURGICAL HISTORY:  Essential hypertension  Hypertension      Atrial fibrillation  s/p cardioversion 2010 and 2014  Pt. reports 4 DCCV  Now on Amiodarone 200mg PO bid and Eliquis 5mg PO bid  Last DCCV 4 yrs ago at [facility]      Crohn's disease  s/p partial resection of ileum      Hyperlipidemia      Hypothyroidism      History of depression  On Venlafaxine ER 150mg PO bid      Junctional rhythm      H/O knee surgery      History of cataract surgery          Current Nutrition Order:  PO- Low fiber, Low fat diet + 2 LPS per day [provide 100 kcals, 15g protein each], + Ensure Max daily [150 kcals, 20g protein]  IV D10 + NS; provides 240g Dex / 816 kcals daily    PO Intake: Good (%) [   ]  Fair (50-75%) [ X  ] Poor (<25%) [   ]    GI Issues:   12/1: ileostomy output 30cc x 24hrs, abdominal wound/fistula output  2400cc x 24hrs, per cony output 550cc x 24hrs     11/27: ileostomy output 50 cc x 24hrs, abdominal wound/fistula output 2175 cc x 24hrs, per cony output 530 cc x 24hrs     11/22: ileostomy output 25cc x 24hrs, abdominal wound/fistula output 2350 cc x 24hrs, per cony output 775cc x 24hrs     11/20: ileostomy output 55cc x 24hrs, abdominal wound/fistula output 2025 cc x 24hrs, per cony output 450cc x 24hrs     11/15:  ileostomy output 65cc x 24hrs, abdominal wound/fistula output 875cc x 24hrs, per cony output 775cc x 24hrs    Pain: no pain/discomfort noted    Skin Integrity: jaundice, abd wound and fistula with wound manager in place, RLQ ileostomy, perc cony drain, lower back/healed wound, Rudy score 17     Labs:   12-01    136  |  110<H>  |  33<H>  ----------------------------<  122<H>  4.3   |  14<L>  |  2.89<H>    Ca    9.0      01 Dec 2023 04:29  Phos  3.8     12-01  Mg     2.1     12-01    TPro  8.3  /  Alb  2.5<L>  /  TBili  7.6<H>  /  DBili  5.1<H>  /  AST  96<H>  /  ALT  71<H>  /  AlkPhos  234<H>  12-01    CAPILLARY BLOOD GLUCOSE          Medications:  MEDICATIONS  (STANDING):  albumin human 25% IVPB 50 milliLiter(s) IV Intermittent every 8 hours  caspofungin IVPB 50 milliGRAM(s) IV Intermittent every 24 hours  chlorhexidine 2% Cloths 1 Application(s) Topical <User Schedule>  cholestyramine Powder (Sugar-Free) 4 Gram(s) Oral daily  dextrose 10% + sodium chloride 0.9%. 1000 milliLiter(s) (100 mL/Hr) IV Continuous <Continuous>  epoetin matt-epbx (RETACRIT) Injectable 23190 Unit(s) SubCutaneous every 7 days  ergocalciferol 89469 Unit(s) Oral <User Schedule>  famotidine  IVPB 20 milliGRAM(s) IV Intermittent daily  folic acid Injectable 1 milliGRAM(s) IV Push daily  glucagon  Injectable 1 milliGRAM(s) IntraMuscular once  heparin   Injectable 5000 Unit(s) SubCutaneous every 8 hours  hydrALAZINE Injectable 10 milliGRAM(s) IV Push every 8 hours  levothyroxine Injectable 30 MICROGram(s) IV Push at bedtime  metoprolol tartrate Injectable 5 milliGRAM(s) IV Push every 6 hours  octreotide  Injectable 100 MICROGram(s) IV Push three times a day  thiamine Injectable 100 milliGRAM(s) IV Push every 24 hours  ursodiol Suspension 300 milliGRAM(s) Oral every 8 hours  venlafaxine XR. 150 milliGRAM(s) Oral two times a day    MEDICATIONS  (PRN):  LORazepam   Injectable 0.5 milliGRAM(s) IV Push at bedtime PRN Anxiety  ondansetron Injectable 4 milliGRAM(s) IV Push every 8 hours PRN Nausea and/or Vomiting      Daily 94.2kg [28 Nov 2023]  Daily 94.4kg [27 Nov 2023]  Daily 94.2kg [26 Nov 2023]  Daily 94.2kg [22 Nov 2023]  Daily 94.2kg [16 Nov 2023]  Daily 94.2 [15 Nov 2023]  Daily 94.2kg [13 Nov 2023]  Daily 95.8kg [09 Nov 2023]  Daily 94.2kg [07 Nov 2023]  Daily 85.2kg [03 Nov 2023]  Daily 85.2kg [31 Oct 2023]  Daily 85.2kg [24 Oct 2023]  Daily 85.2kg [20 Oct 2023]  Daily 81.9kg [18 Oct 2023]  Daily 85.2kg [16 Oct 2023]  Daily   95.2kg [12 Oct 2023]   Daily 87.7kg [11 Oct 2023]  Daily 87.4kg [09 Oct 2023]  Daily 86.4kg [07 Oct 2023]  Daily 82.5kg [06 Oct 2023]  Daily 82.6kg [4 Oct 2023]   Daily 83.5kg [03 Oct 2023]  Daily 84.5kg [2 Oct 2023]  Daily 87.2kg [1 Oct 2023]  Daily 88.3kg [29 Sep 2023]  Daily 89.9kg [28 Sep 2023]  Daily 87.7kg [24 Sep 2023]  Daily 90.4kg [21 Sep 2023]  Daily 91.4kg [20 Sep 2023]  Daily 86.7kg [18 Sep 2023]  Daily 88.1kg [16 Sep 2023]  Daily 88.9kg [15 Sep 2023]   Daily 89.1 [14 Sep 2023]  Daily 92.9kg [6 Sep 2023]  Daily 91.8kg [27 Aug 2023]  Daily 101kg [9 Aug 2023]       Weight Change: weight trending down throughout admission, now appears to be trending back up. Recommend continue weekly/daily weights for trending & ensuring adequacy of nutrition provision    IBW: 86.4kg  % IBW: 109.0%    Estimated energy needs:   Ideal body weight (86.4kg) used for calculations as pt >100% IBW and BMI <30 per [facility] Standards of Care. Needs estimated for age and adjusted for current clinical status, increased needs for post-op & open abd wound healing    Calories: 6978-0435 kcals based on 30-40 kcals/kg  Protein: 129.6-172.8 g protein based on 1.5-2.0g protein/kg + additional depending on outputs  *Fluid needs per team    Subjective:   78 M w/ Crohn's, AFib/Flutter s/p DCCVs on amiodarone, remote ileocectomy and open appendectomy. Admitted (6/23) for SBO vs Crohns flare, s/p NGT decompression and s/p lap TRE converted to open TRE, SBR x 3, left in discontinuity with abthera vac on (6/27), RTOR for ileocolic resection, small bowel anastomosis, and abdominal wall closure on (6/28), c/b fluid collection s/p IR aspiration of perihepatic fluid on (7/3), c/b wound dehiscence s/p RTOR exlap, washout, ileocolic resection with end ileostomy, blow hole colostomy, red rubber from ileostomy to small bowel anastomosis; vicryl bridging mesh on (7/5) transferred to SICU postoperatively for hemodynamic monitoring, with hospital course complicated by periods of AMS most recently on 9/1 and associated with oliguria, severe ELVI and hyponatremia, which resolved but stepped back up for to SICU on 9/10 for acute AMS, intermittent hypoglycemia, AFib with RVR. Percutaneous Cholecystostomy placed on 9/11 for enlarged GB, PCT capped 10/5.    Chart reviewed. Pt seen sleeping on 5 EA, discussed with team. TPN stopped 11/23 due to fungemia, PICC removed. Pt continues on PO diet + oral nutrition supplement & IV D10 + NS [provides 240g Dex / 816 kcals daily]. Abdominal wound/fistula output  2400cc x 24hrs, per cony output 550cc x 24hrs. Pt likely not absorbing most or all of PO intake as seen by high fistula outputs containing undigested food. Inadequate nutrition x7 days. Plan to replace PICC for TPN as soon as medically feasible, consider temporary PPN. Noted with low levels of vitamin D- deficiency can contribute to liver disease & hyperbilirubinemia, now on supplementation [PO supplement, ? absorption], recommend to monitor levels. Labs reviewed- chloride 110 <H>, bicarb 14 <L>, BUN 33 <H>, Creat 2.89 <H> [nephro following], Albumin 2.5 <L>, total bilirubin 7.6 <H>, direct bilirubin 5.1 <H>, indirect bilirubin 2.5 <H>, calcitriol 8.4 <L>, calcifediol 14.1 <L>. Meds: IV D10 + NS @100ml/hr, 50,000 units ergocalciferol every week, IV folic acid, IV thiamine, octreotide, EPO, ursodiol, cholestyramine, IV zofran. RDN will continue to monitor, reassess, and intervene as appropriate.     Previous Nutrition Diagnosis: Increased Nutrient Needs R/T physiological demands for nutrient AEB post-op wound healing    Active [ X  ]  Resolved [   ]    If resolved, new PES:     Goal:  Resume TPN as soon as medically feasible    Recommendations:  1. Consider temporary PPN via peripheral line while TPN not feasible  2. Resume TPN as soon as medically feasible  - restart at 150g dextrose, 72g AA  - goal- 361g Dex, 144g AA daily over 18hrs; 50g SMOF lipids ***lipids only 3x/week- MWF schedule***  - to provide daily average of 2017.7 kcals daily, 144g protein, GIR 2.66 mg/kg/min [23.4 kcals/kg, 16.7 non-protein kcals/kg, 1.67g protein/kg IBW]  - consider increasing copper and zinc [low per latest labs]  - recommend cycling TPN over 12-16hrs as feasible  - monitor weights & wound healing, adjust substrates as indicated  3. Continue Vit D supplementation  - recommend to recheck levels 12/28  4. PO diet per team discretion  - recommend Low fat PO diet + 1 LPS BID [200 kcals, 30g protein] + 1 Ensure Max daily [150 kcals, 30g protein] -- d/c low fiber restriction  - consider NPO for bowel rest as indicated   - ongoing diet education prn  5. Hydration per team  - risk for dehydration with high outputs, monitor  - additional fluids prn  6. Monitor renal function  7. Fluids & lytes per team  - consider oral rehydration solution prn  8. Weekly lipid panel while on TPN  - hold/decrease lipids if TG >400  9. Monitor BMP/Mg/Phos, POC BG while on TPN  - monitor & replenish lytes outside TPN bag prn  10. Continue close monitoring of clinical course & adjust recommendations prn    Education: ongoing diet education for PO recs    Risk Level: High [ X  ] Moderate [   ] Low [   ]

## 2023-12-01 NOTE — PROGRESS NOTE ADULT - ASSESSMENT
79 yo M with prolonged hospital course, admited for SBO and CD flare s/p multiple GI surgeries with wound dehiscense, ileostomy EC fistula, PCT placement with worsening LFTs, recurrent ELVI in the setting of candidemia. Nephrology reconsulted for ELVI.    ELVI, suspect hemodynamic/septic ATN, cannot rule out drug-induced AIN given PPI use, possible GN with recurrent hematuria, paraprotein-related renal disease  - Cr rising, 2.9 from baseline 0.9 over one week although rate of increase appears to be tapering. UOP adequate. Rebeca 118. UPCR 0.8.  - Abnormal spep and immunofixation - m-spike and gamma globulin  - Awaiting serum free light chains and ratio, urine electrophoresis, and urine immunofixation  - Awaiting GN workup - ELEUTERIO, anti-dsDNA, ANCA, mpo, pr3, complement C3/C4, anti-glomerular baseline membrane, HBV, HCV, HIV, cryoglobulins  - Pending renal US to assess for hydronephrosis  - Can continue with caspofungin for now, however possibly associated with ELVI although not as commonly as amphotericin. If ELVI does not improve may consider switching to another agent if possible, however patient will require continued treatment for candidemia.  - Continue IV fluids as tolerated, monitor volume and respiratory status closely to maintain approximately net even volume status  - Continue to trend creatinine  - Maintain MAP >65 for renal perfusion  - Avoid nephrotoxic meds, IV contrast for now  - Strict I&O, daily weights 77 yo M with prolonged hospital course, admited for SBO and CD flare s/p multiple GI surgeries with wound dehiscense, ileostomy EC fistula, PCT placement with worsening LFTs, recurrent ELVI in the setting of candidemia. Nephrology reconsulted for ELVI.    ELVI, suspect hemodynamic/septic ATN, cannot rule out drug-induced AIN given PPI use, possible GN with recurrent hematuria, paraprotein-related renal disease  - Cr rising, 2.9 from baseline 0.9 over one week although rate of increase appears to be tapering. UOP adequate. Rebeca 118. UPCR 0.8.  - Abnormal spep and immunofixation - m-spike and gamma globulin  - Awaiting serum free light chains and ratio, urine electrophoresis, and urine immunofixation  - Awaiting GN workup - ELEUTERIO, anti-dsDNA, ANCA, mpo, pr3, complement C3/C4, anti-glomerular baseline membrane, HBV, HCV, HIV, cryoglobulins  - Pending renal US to assess for hydronephrosis  - Can continue with caspofungin for now, however possibly associated with ELVI although not as commonly as amphotericin. If ELVI does not improve may consider switching to another agent if possible, however patient will require continued treatment for candidemia.  - Continue IV fluids as tolerated - switch to D5 1/2 NS with 1.5 amps bicarb, monitor volume and respiratory status closely to maintain approximately net even volume status  - Continue to trend creatinine  - Maintain MAP >65 for renal perfusion  - Avoid nephrotoxic meds, IV contrast for now  - Strict I&O, daily weights

## 2023-12-01 NOTE — PROGRESS NOTE ADULT - ATTENDING COMMENTS
Clinically stable, afeb and WBC wnl.  Recommend switch to fluconazole as above if QTc permits. Otherwise would complete therapy with caspofungin.  EOT is 12/8 for 2 weeks total.  Please follow pending blood cultures and call ID with any new positive results. Clinically stable, afeb and WBC wnl.  Recommend switch to fluconazole 600mg daily (renal dose equivalent of 12mg/kg) if QTc permits. Otherwise would complete therapy with caspofungin.  EOT is 12/8 for 2 weeks total.  Please follow pending blood cultures and call ID with any new positive results.

## 2023-12-02 NOTE — PROGRESS NOTE ADULT - ATTENDING COMMENTS
UC, AF, s/p SBR with end-ileostomy, ileocolic resection, enteroatmospheric fistula, multiple infections, now candidemia with ATN  physical as above  continue fluconazole  if stable replace PICC Monday for TPN  continue metoprolo  renal function is stable  rest as abovel

## 2023-12-02 NOTE — PROGRESS NOTE ADULT - ASSESSMENT
79 yo M with prolonged hospital course, admited for SBO and CD flare s/p multiple GI surgeries with wound dehiscense, ileostomy EC fistula, PCT placement with worsening LFTs, recurrent ELVI in the setting of candidemia. Nephrology reconsulted for ELVI.    sCr baseline 0.9  UPCR 0.8.  Abnormal spep and immunofixation - m-spike and gamma globulin  AI panel: ELEUTERIO positive (weak), dsDNA negative, HIV neg, HBV immune, HCV negative.   Renal US: Limited sonographic visualization of the bilateral kidneys. Normal size   kidneys without hydronephrosis.  No evidence of renal artery stenosis.    Imp: ELVI, suspect hemodynamic/septic ATN. Will also r/o GN with recurrent hematuria and paraprotein-related renal disease.    Plan:  - SCr plat 2.7  - Follow free light chains ratio, urine electrophoresis, and urine immunofixation  - Awaiting GN workup - ANCA, mpo, pr3, complement C3/C4, anti-glomerular baseline membrane, cryoglobulins  - Currently on Fluconazole for candidemia.  - Continue IV fluids as tolerated - switch to D5 1/2 NS with 1.5 amps bicarb.  - Daily labs.   - Maintain MAP >65 for renal perfusion  - Strict I&O, daily weights

## 2023-12-02 NOTE — PROGRESS NOTE ADULT - SUBJECTIVE AND OBJECTIVE BOX
Nephrology progress note    Seen at bedside. Resting comfortably in chair. No complaints.    Allergies:  penicillin (Angioedema)    REVIEW OF SYSTEMS:  All other review of systems is negative unless indicated above.    PHYSICAL EXAM:  Constitutional: NAD, sitting in chair.   HEENT: NCAT, EOMI  Respiratory: CTAB, no crackles  Cardiovascular: regular rate, no murmur  Gastrointestinal: abdominal wound noted with conrado pouch, PCT noted  Extremities: trace LE edema  Neurological: Awake, alert, moving all extremities    Hospital Medications:   MEDICATIONS  (STANDING):  albumin human 25% IVPB 50 milliLiter(s) IV Intermittent every 8 hours  chlorhexidine 2% Cloths 1 Application(s) Topical <User Schedule>  cholestyramine Powder (Sugar-Free) 4 Gram(s) Oral daily  dextrose 10% + sodium chloride 0.45% 1000 milliLiter(s) (110 mL/Hr) IV Continuous <Continuous>  epoetin matt-epbx (RETACRIT) Injectable 05301 Unit(s) SubCutaneous every 7 days  ergocalciferol 08913 Unit(s) Oral <User Schedule>  famotidine Injectable 20 milliGRAM(s) IV Push daily  fluconAZOLE IVPB 600 milliGRAM(s) IV Intermittent every 24 hours  folic acid Injectable 1 milliGRAM(s) IV Push daily  glucagon  Injectable 1 milliGRAM(s) IntraMuscular once  heparin   Injectable 5000 Unit(s) SubCutaneous every 8 hours  hydrALAZINE Injectable 10 milliGRAM(s) IV Push every 8 hours  levothyroxine Injectable 30 MICROGram(s) IV Push at bedtime  metoprolol tartrate Injectable 5 milliGRAM(s) IV Push every 6 hours  octreotide  Injectable 100 MICROGram(s) IV Push three times a day  thiamine Injectable 100 milliGRAM(s) IV Push every 24 hours  ursodiol Suspension 300 milliGRAM(s) Oral every 8 hours  venlafaxine XR. 150 milliGRAM(s) Oral two times a day    VITALS:  T(F): 97.8 (12-02-23 @ 09:00), Max: 97.8 (12-02-23 @ 09:00)  HR: 89 (12-02-23 @ 12:00)  BP: 158/61 (12-02-23 @ 12:00)  RR: 22 (12-02-23 @ 12:00)  SpO2: 97% (12-02-23 @ 12:00)  Wt(kg): --    11-30 @ 07:01  -  12-01 @ 07:00  --------------------------------------------------------  IN: 4460 mL / OUT: 4575 mL / NET: -115 mL    12-01 @ 07:01  -  12-02 @ 07:00  --------------------------------------------------------  IN: 3400 mL / OUT: 4470 mL / NET: -1070 mL    12-02 @ 07:01  -  12-02 @ 13:05  --------------------------------------------------------  IN: 860 mL / OUT: 635 mL / NET: 225 mL      LABS:  12-02    135  |  113<H>  |  29<H>  ----------------------------<  115<H>  4.4   |  16<L>  |  2.72<H>    Ca    9.0      02 Dec 2023 03:16  Phos  3.2     12-02  Mg     1.7     12-02    TPro  8.4<H>  /  Alb  2.8<L>  /  TBili  7.5<H>  /  DBili  5.0<H>  /  AST  86<H>  /  ALT  65<H>  /  AlkPhos  206<H>  12-02                          8.4    11.02 )-----------( 237      ( 02 Dec 2023 07:51 )             26.5       Urine Studies:  Creatinine Trend: 2.72<--, 2.63<--, 2.89<--, 2.78<--, 2.62<--, 2.58<--  Urinalysis Basic - ( 02 Dec 2023 03:16 )    Color:  / Appearance:  / SG:  / pH:   Gluc: 115 mg/dL / Ketone:   / Bili:  / Urobili:    Blood:  / Protein:  / Nitrite:    Leuk Esterase:  / RBC:  / WBC    Sq Epi:  / Non Sq Epi:  / Bacteria:       Sodium, Random Urine: 118 mmol/L (11-30 @ 05:07)  Creatinine, Random Urine: 34 mg/dL (11-30 @ 05:07)  Protein/Creatinine Ratio Calculation: 0.8 Ratio (11-30 @ 05:07)  Osmolality, Random Urine: 344 mosm/kg (11-30 @ 05:07)  Potassium, Random Urine: 8 mmol/L (11-30 @ 05:07)    RADIOLOGY & ADDITIONAL STUDIES:

## 2023-12-02 NOTE — PROGRESS NOTE ADULT - SUBJECTIVE AND OBJECTIVE BOX
24 hr events:  ON: dsDNA <12 (norm), Bicarb gtt started around 8pm, 9pm BMP w/ Na 133, Bicarb 13, AG 10, BUN/Cr downtrending; switched to Fluc, to start on 12/2; AM Labs early at 3 am Bicarb 16 (13), repleted; PTT elev with just SQH  12/1: dc'd opium tincture, As per Renal Dc Protonix started, as per Epi: can repeat Drain cx x2. Bicarb decreased therefore changed IVF to d10 1/2NS with 1.5 amps of bicarb. Renal US: negative for hydro Abd US: negative for lesion and patent flow throughout.     MEDICATIONS  (STANDING):  albumin human 25% IVPB 50 milliLiter(s) IV Intermittent every 8 hours  chlorhexidine 2% Cloths 1 Application(s) Topical <User Schedule>  cholestyramine Powder (Sugar-Free) 4 Gram(s) Oral daily  dextrose 10% + sodium chloride 0.45% 1000 milliLiter(s) (110 mL/Hr) IV Continuous <Continuous>  epoetin matt-epbx (RETACRIT) Injectable 49924 Unit(s) SubCutaneous every 7 days  ergocalciferol 44600 Unit(s) Oral <User Schedule>  famotidine Injectable 20 milliGRAM(s) IV Push daily  fluconAZOLE IVPB 600 milliGRAM(s) IV Intermittent every 24 hours  folic acid Injectable 1 milliGRAM(s) IV Push daily  glucagon  Injectable 1 milliGRAM(s) IntraMuscular once  heparin   Injectable 5000 Unit(s) SubCutaneous every 8 hours  hydrALAZINE Injectable 10 milliGRAM(s) IV Push every 8 hours  levothyroxine Injectable 30 MICROGram(s) IV Push at bedtime  metoprolol tartrate Injectable 5 milliGRAM(s) IV Push every 6 hours  octreotide  Injectable 100 MICROGram(s) IV Push three times a day  thiamine Injectable 100 milliGRAM(s) IV Push every 24 hours  ursodiol Suspension 300 milliGRAM(s) Oral every 8 hours  venlafaxine XR. 150 milliGRAM(s) Oral two times a day    MEDICATIONS  (PRN):  LORazepam   Injectable 0.5 milliGRAM(s) IV Push at bedtime PRN Anxiety  ondansetron Injectable 4 milliGRAM(s) IV Push every 8 hours PRN Nausea and/or Vomiting      ICU Vital Signs Last 24 Hrs  T(C): 36.6 (02 Dec 2023 09:00), Max: 36.6 (02 Dec 2023 09:00)  T(F): 97.8 (02 Dec 2023 09:00), Max: 97.8 (02 Dec 2023 09:00)  HR: 89 (02 Dec 2023 10:00) (86 - 90)  BP: 150/69 (02 Dec 2023 10:00) (122/58 - 178/78)  BP(mean): 99 (02 Dec 2023 10:00) (84 - 119)  ABP: --  ABP(mean): --  RR: 21 (02 Dec 2023 10:00) (15 - 31)  SpO2: 95% (02 Dec 2023 10:00) (94% - 100%)    O2 Parameters below as of 02 Dec 2023 10:00  Patient On (Oxygen Delivery Method): room air            Physical Exam:  General: NAD, mildly jaundiced  HEENT: NC/AT, EOMI, PERRLA, normal hearing, no oral lesions, neck supple w/o LAD  Pulmonary: Nonlabored breathing, no respiratory distress, CTA-B  Cardiovascular: NSR, no murmurs  Abdominal: soft, NT/ND, Dustin in place, ileostomy and red rubber in place  Extremities: WWP, 5/5 strength x 4, no clubbing/cyanosis/edema  Neuro: A/O x3, CNs II-XII grossly intact, normal motor/sensation, no focal deficits  Pulses: palpable distal pulses      I&O's Summary    01 Dec 2023 07:01  -  02 Dec 2023 07:00  --------------------------------------------------------  IN: 3400 mL / OUT: 4470 mL / NET: -1070 mL    02 Dec 2023 07:01  -  02 Dec 2023 10:33  --------------------------------------------------------  IN: 570 mL / OUT: 335 mL / NET: 235 mL        LABS:                        8.4    11.02 )-----------( 237      ( 02 Dec 2023 07:51 )             26.5     12-02    135  |  113<H>  |  29<H>  ----------------------------<  115<H>  4.4   |  16<L>  |  2.72<H>    Ca    9.0      02 Dec 2023 03:16  Phos  3.2     12-02  Mg     1.7     12-02    TPro  8.4<H>  /  Alb  2.8<L>  /  TBili  7.5<H>  /  DBili  5.0<H>  /  AST  86<H>  /  ALT  65<H>  /  AlkPhos  206<H>  12-02    PT/INR - ( 02 Dec 2023 03:16 )   PT: 14.8 sec;   INR: 1.31          PTT - ( 02 Dec 2023 03:16 )  PTT:42.1 sec  Urinalysis Basic - ( 02 Dec 2023 03:16 )    Color: x / Appearance: x / SG: x / pH: x  Gluc: 115 mg/dL / Ketone: x  / Bili: x / Urobili: x   Blood: x / Protein: x / Nitrite: x   Leuk Esterase: x / RBC: x / WBC x   Sq Epi: x / Non Sq Epi: x / Bacteria: x       24 hr events:  ON: dsDNA <12 (norm), Bicarb gtt started around 8pm, 9pm BMP w/ Na 133, Bicarb 13, AG 10, BUN/Cr downtrending; switched to Fluc, to start on 12/2; AM Labs early at 3 am Bicarb 16 (13), repleted; PTT elev with just SQH  12/1: dc'd opium tincture, As per Renal Dc Protonix started, as per Epi: can repeat Drain cx x2. Bicarb decreased therefore changed IVF to d10 1/2NS with 1.5 amps of bicarb. Renal US: negative for hydro Abd US: negative for lesion and patent flow throughout.     MEDICATIONS  (STANDING):  albumin human 25% IVPB 50 milliLiter(s) IV Intermittent every 8 hours  chlorhexidine 2% Cloths 1 Application(s) Topical <User Schedule>  cholestyramine Powder (Sugar-Free) 4 Gram(s) Oral daily  dextrose 10% + sodium chloride 0.45% 1000 milliLiter(s) (110 mL/Hr) IV Continuous <Continuous>  epoetin matt-epbx (RETACRIT) Injectable 45703 Unit(s) SubCutaneous every 7 days  ergocalciferol 13046 Unit(s) Oral <User Schedule>  famotidine Injectable 20 milliGRAM(s) IV Push daily  fluconAZOLE IVPB 600 milliGRAM(s) IV Intermittent every 24 hours  folic acid Injectable 1 milliGRAM(s) IV Push daily  glucagon  Injectable 1 milliGRAM(s) IntraMuscular once  heparin   Injectable 5000 Unit(s) SubCutaneous every 8 hours  hydrALAZINE Injectable 10 milliGRAM(s) IV Push every 8 hours  levothyroxine Injectable 30 MICROGram(s) IV Push at bedtime  metoprolol tartrate Injectable 5 milliGRAM(s) IV Push every 6 hours  octreotide  Injectable 100 MICROGram(s) IV Push three times a day  thiamine Injectable 100 milliGRAM(s) IV Push every 24 hours  ursodiol Suspension 300 milliGRAM(s) Oral every 8 hours  venlafaxine XR. 150 milliGRAM(s) Oral two times a day    MEDICATIONS  (PRN):  LORazepam   Injectable 0.5 milliGRAM(s) IV Push at bedtime PRN Anxiety  ondansetron Injectable 4 milliGRAM(s) IV Push every 8 hours PRN Nausea and/or Vomiting      ICU Vital Signs Last 24 Hrs  T(C): 36.6 (02 Dec 2023 09:00), Max: 36.6 (02 Dec 2023 09:00)  T(F): 97.8 (02 Dec 2023 09:00), Max: 97.8 (02 Dec 2023 09:00)  HR: 89 (02 Dec 2023 10:00) (86 - 90)  BP: 150/69 (02 Dec 2023 10:00) (122/58 - 178/78)  BP(mean): 99 (02 Dec 2023 10:00) (84 - 119)  ABP: --  ABP(mean): --  RR: 21 (02 Dec 2023 10:00) (15 - 31)  SpO2: 95% (02 Dec 2023 10:00) (94% - 100%)    O2 Parameters below as of 02 Dec 2023 10:00  Patient On (Oxygen Delivery Method): room air            Physical Exam:  General: NAD, mildly jaundiced  HEENT: NC/AT, EOMI, PERRLA, normal hearing, no oral lesions, neck supple w/o LAD  Pulmonary: Nonlabored breathing, no respiratory distress, CTA-B  Cardiovascular: NSR, no murmurs  Abdominal: soft, NT/ND, Dustni in place, ileostomy and red rubber in place  Extremities: WWP, 5/5 strength x 4, no clubbing/cyanosis/edema  Neuro: A/O x3, CNs II-XII grossly intact, normal motor/sensation, no focal deficits  Pulses: palpable distal pulses      I&O's Summary    01 Dec 2023 07:01  -  02 Dec 2023 07:00  --------------------------------------------------------  IN: 3400 mL / OUT: 4470 mL / NET: -1070 mL    02 Dec 2023 07:01  -  02 Dec 2023 10:33  --------------------------------------------------------  IN: 570 mL / OUT: 335 mL / NET: 235 mL        LABS:                        8.4    11.02 )-----------( 237      ( 02 Dec 2023 07:51 )             26.5     12-02    135  |  113<H>  |  29<H>  ----------------------------<  115<H>  4.4   |  16<L>  |  2.72<H>    Ca    9.0      02 Dec 2023 03:16  Phos  3.2     12-02  Mg     1.7     12-02    TPro  8.4<H>  /  Alb  2.8<L>  /  TBili  7.5<H>  /  DBili  5.0<H>  /  AST  86<H>  /  ALT  65<H>  /  AlkPhos  206<H>  12-02    PT/INR - ( 02 Dec 2023 03:16 )   PT: 14.8 sec;   INR: 1.31          PTT - ( 02 Dec 2023 03:16 )  PTT:42.1 sec  Urinalysis Basic - ( 02 Dec 2023 03:16 )    Color: x / Appearance: x / SG: x / pH: x  Gluc: 115 mg/dL / Ketone: x  / Bili: x / Urobili: x   Blood: x / Protein: x / Nitrite: x   Leuk Esterase: x / RBC: x / WBC x   Sq Epi: x / Non Sq Epi: x / Bacteria: x       24 hr events:  ON: dsDNA <12 (norm), Bicarb gtt started around 8pm, 9pm BMP w/ Na 133, Bicarb 13, AG 10, BUN/Cr downtrending; switched to Fluc, to start on 12/2; AM Labs early at 3 am Bicarb 16 (13), repleted; PTT elev with just SQH  12/1: dc'd opium tincture, As per Renal Dc Protonix started, as per Epi: can repeat Drain cx x2. Bicarb decreased therefore changed IVF to d10 1/2NS with 1.5 amps of bicarb. Renal US: negative for hydro Abd US: negative for lesion and patent flow throughout.     MEDICATIONS  (STANDING):  albumin human 25% IVPB 50 milliLiter(s) IV Intermittent every 8 hours  chlorhexidine 2% Cloths 1 Application(s) Topical <User Schedule>  cholestyramine Powder (Sugar-Free) 4 Gram(s) Oral daily  dextrose 10% + sodium chloride 0.45% 1000 milliLiter(s) (110 mL/Hr) IV Continuous <Continuous>  epoetin matt-epbx (RETACRIT) Injectable 86583 Unit(s) SubCutaneous every 7 days  ergocalciferol 65468 Unit(s) Oral <User Schedule>  famotidine Injectable 20 milliGRAM(s) IV Push daily  fluconAZOLE IVPB 600 milliGRAM(s) IV Intermittent every 24 hours  folic acid Injectable 1 milliGRAM(s) IV Push daily  glucagon  Injectable 1 milliGRAM(s) IntraMuscular once  heparin   Injectable 5000 Unit(s) SubCutaneous every 8 hours  hydrALAZINE Injectable 10 milliGRAM(s) IV Push every 8 hours  levothyroxine Injectable 30 MICROGram(s) IV Push at bedtime  metoprolol tartrate Injectable 5 milliGRAM(s) IV Push every 6 hours  octreotide  Injectable 100 MICROGram(s) IV Push three times a day  thiamine Injectable 100 milliGRAM(s) IV Push every 24 hours  ursodiol Suspension 300 milliGRAM(s) Oral every 8 hours  venlafaxine XR. 150 milliGRAM(s) Oral two times a day    MEDICATIONS  (PRN):  LORazepam   Injectable 0.5 milliGRAM(s) IV Push at bedtime PRN Anxiety  ondansetron Injectable 4 milliGRAM(s) IV Push every 8 hours PRN Nausea and/or Vomiting      ICU Vital Signs Last 24 Hrs  T(C): 36.6 (02 Dec 2023 09:00), Max: 36.6 (02 Dec 2023 09:00)  T(F): 97.8 (02 Dec 2023 09:00), Max: 97.8 (02 Dec 2023 09:00)  HR: 89 (02 Dec 2023 10:00) (86 - 90)  BP: 150/69 (02 Dec 2023 10:00) (122/58 - 178/78)  BP(mean): 99 (02 Dec 2023 10:00) (84 - 119)  ABP: --  ABP(mean): --  RR: 21 (02 Dec 2023 10:00) (15 - 31)  SpO2: 95% (02 Dec 2023 10:00) (94% - 100%)    O2 Parameters below as of 02 Dec 2023 10:00  Patient On (Oxygen Delivery Method): room air            Physical Exam:  General: NAD, mildly jaundiced  HEENT: NC/AT, EOMI, PERRLA, normal hearing, no oral lesions, neck supple w/o LAD  Pulmonary: Nonlabored breathing, no respiratory distress, CTA-B  Cardiovascular: NSR, no murmurs  Abdominal: soft, NT/ND, Dustin in place, ileostomy and red rubber in place  Extremities: WWP, 5/5 strength x 4, no clubbing/cyanosis/edema  Neuro: A/O x3, CNs II-XII grossly intact, normal motor/sensation, no focal deficits  Pulses: palpable distal pulses      I&O's Summary    01 Dec 2023 07:01  -  02 Dec 2023 07:00  --------------------------------------------------------  IN: 3400 mL / OUT: 4470 mL / NET: -1070 mL    02 Dec 2023 07:01  -  02 Dec 2023 10:33  --------------------------------------------------------  IN: 570 mL / OUT: 335 mL / NET: 235 mL        LABS:                        8.4    11.02 )-----------( 237      ( 02 Dec 2023 07:51 )             26.5     12-02    135  |  113<H>  |  29<H>  ----------------------------<  115<H>  4.4   |  16<L>  |  2.72<H>    Ca    9.0      02 Dec 2023 03:16  Phos  3.2     12-02  Mg     1.7     12-02    TPro  8.4<H>  /  Alb  2.8<L>  /  TBili  7.5<H>  /  DBili  5.0<H>  /  AST  86<H>  /  ALT  65<H>  /  AlkPhos  206<H>  12-02    PT/INR - ( 02 Dec 2023 03:16 )   PT: 14.8 sec;   INR: 1.31          PTT - ( 02 Dec 2023 03:16 )  PTT:42.1 sec  Urinalysis Basic - ( 02 Dec 2023 03:16 )    Color: x / Appearance: x / SG: x / pH: x  Gluc: 115 mg/dL / Ketone: x  / Bili: x / Urobili: x   Blood: x / Protein: x / Nitrite: x   Leuk Esterase: x / RBC: x / WBC x   Sq Epi: x / Non Sq Epi: x / Bacteria: x

## 2023-12-02 NOTE — PROGRESS NOTE ADULT - SUBJECTIVE AND OBJECTIVE BOX
Covering for Dr. Peterson.    Feels tired.  Eating  AFVSS  Jaundiced  Drainage controlled.    Cr plateauing  LFTs stable    Enteroatmospheric fistula controlled.  Fungemia likely cause of renal insufficiency.  Continue antifungal  On LRD  Ambulating

## 2023-12-02 NOTE — PROGRESS NOTE ADULT - ASSESSMENT
A/p: 78M w PMH Crohn's, AFib/Flutter s/p DCCVs on amiodarone, remote ileocectomy and open appendectomy. Admitted (6/23) for SBO vs Crohns flare, s/p NGT decompression and s/p lap converted to open TRE, SBR x 3, left in discontinuity with abthera vac on (6/27), RTOR for ileocolic resection, small bowel anastomosis, and abdominal wall closure on (6/28), c/b fluid collection s/p IR aspiration of perihepatic fluid on (7/3), c/b wound dehiscence s/p RTOR exlap, washout, ileocolic resection with end ileostomy, blow hole colostomy, red rubber from ileostomy to small bowel anastomosis; vicryl bridging mesh on (7/5) transferred to SICU postoperatively for hemodynamic monitoring, with hospital course complicated by periods of severe ELVI and hyponatremia, which resolved but stepped back up for to SICU on (9/10) for acute AMS, intermittent hypoglycemia, AFib with RVR. Percutaneous Cholecystostomy placed on (9/11) for enlarged GB, PCT capped 10/5 and uncapped 10/25 for hyperbilirubinemia, Right brachial DVT, left basillic and cephalic superficial thrombus on duplex 11/2, CT 11/14 with ALDEN ground glass opacity of unclear etiology, completed empiric 7day cefepime course 11/22, rising T-bili of unclear etilogy, now w fungemia (candida glabrata) CLABSI, and worsening renal function.    NEURO: AMS resolved, likely septic encephalopathy, EEG neg. Hx of depression - cont Effexor. Nausea: Zofran PRN. Anxiety: Lorazepam PRN.  CV: Hx Afib/flutter: s/p DCCV, Amio stopped due to persistent transaminitis. Continue Metoprolol 5q6 with hold parameters. Hx HLD: Lipitor held given high LFTs. TTE  (7/18) - PASP 64mmHg, EF 65-70%, Hx HTN -  stopped PO Norvasc as unlikely absorbing, switch to hydralazine IV, holding Losartan. Appears euvolemic on exam.  PULM: CT from 11/14 with ALDEN ground glass opacity of unclear etiology. COVID negative. RVP negative. completed 7d empiric cefepime 11/22 to cover for potential PNA. Now on RA.   GI/FEN: Low res, low fat, high protein diet. Cont Ensure max x1/day. 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 will replace PICC line on 12/4 and restart TPN.  High output EC fistula: cont Octreotide,& Cholestyramine 10/18. Transaminitis elevated T bili of unclear origin--consult to Team 1 Sgy, s/p Perc Deb on 9/11; MRCP 10/30 no obstruction, seen by Hepatology started on ursodiol, RUQ US 11/25 CBD only 5mm, hepatic vessels patent  Requesting repeat Duplex w portal venous and arterial system, as well as image of ampulla. MRCP neg, cont PPI. Monitor drainage from fistula, bolus as needed to keep I&O even. Ensure once daily with LPS. On D10NS@100 for now while holding TPN. Folate, thiamine added.  : Voids. ELVI, Cr peaking, Urine lytes FeUrea Intrinsic ATN, unlikely volume down as spec grav low and good UOP. MARLO Duplex pending, RP US pending, CK wnl, Renal consult placed.   ENDO: Hx hypothyroid: IV Synthroid, TSH wnl on 10/23  ID: Per ID, unlikely Caspo leading to ELVI, so will continue course. Chest CT with ALDEN with ground glass opacities, unclear etiology. bld cx from 11/22 grew candida glabrata. likely CLABSI. PICC removed 11/24.  ID consulted Cont Fluconazole ( 12/1-) Repeat surveillance blood cx NGTD. ophthalmology evaulated - normal ocular exam. echo no vegetation, will replace picc line on 12/4.  off cefepime/flagyl. recent MRSA swab negative, RVP panel negative, COVID negative this past week. Cont Nystatin powder // PREVIOUSLY   caspofungin (11/24-12/1). completed empiric 7days cefepime (11/15-11/22), had C. tertium, Lactobacillis from his IR cx 7/3, and candida albicans, lactobacillus, vanc sensitive E faecium, vanc resistant E gallinarum, vanc resistant E casseliflavus, lactobacillus from his OR cx 7/5. Completed course of abx with Imipenem (9/10-9/15). Imipenem (8/26--) imipenem (6/30-7/12, 7/23-7/30), Dapto (6/30-7/5 and 7/23-7/24). Empiric dapto (8/23-24) and cefepime (8/23-24).   PPX: SCDs, SQH, was on Therapeutic lovenox bid for R brachial vein DVT, but will hold off on hep gtt since repeat US Duplex negative.  HEME: Anemia: continue Epogen weekly. S/p Iron Sucrose 200 qd x 3 days for chronic anemia.  LINES: PIVs // DC: LUE PICC (9/1-11/1 ), RUE PICC: (11/1-11/24)   WOUNDS/DRAINS: Abdominal wound and fistula with wound manager in place dressing change Tuesday and Friday. RLQ Ostomy. IR perc deb tube. // DC: L Clay drain.  PT: Ambulate as tolerated   DISPO: SICU due to complicated hospital course.

## 2023-12-03 NOTE — PROGRESS NOTE ADULT - NS ATTEND AMEND GEN_ALL_CORE FT
UC, AF, s/p SBR with end ileostomy, ileocolic resection, enteroatmospheric fistula, now with candidemia, ATN  physical as above  continue fluconazole  WBC lower today  renal numbers plateauing  continue metoprolol  LFTs improving except bilirubin but sizeable indirect component  increase hydralazine for higher BP recently

## 2023-12-03 NOTE — PROGRESS NOTE ADULT - ASSESSMENT
A/p: 78M w PMH Crohn's, AFib/Flutter s/p DCCVs on amiodarone, remote ileocectomy and open appendectomy. Admitted (6/23) for SBO vs Crohns flare, s/p NGT decompression and s/p lap converted to open TRE, SBR x 3, left in discontinuity with abthera vac on (6/27), RTOR for ileocolic resection, small bowel anastomosis, and abdominal wall closure on (6/28), c/b fluid collection s/p IR aspiration of perihepatic fluid on (7/3), c/b wound dehiscence s/p RTOR exlap, washout, ileocolic resection with end ileostomy, blow hole colostomy, red rubber from ileostomy to small bowel anastomosis; vicryl bridging mesh on (7/5) transferred to SICU postoperatively for hemodynamic monitoring, with hospital course complicated by periods of severe ELVI and hyponatremia, which resolved but stepped back up for to SICU on (9/10) for acute AMS, intermittent hypoglycemia, AFib with RVR. Percutaneous Cholecystostomy placed on (9/11) for enlarged GB, PCT capped 10/5 and uncapped 10/25 for hyperbilirubinemia, Right brachial DVT, left basillic and cephalic superficial thrombus on duplex 11/2, CT 11/14 with ALDEN ground glass opacity of unclear etiology, completed empiric 7day cefepime course 11/22, rising T-bili of unclear etiology now w fungemia (candida glabrata) CLABSI, and worsening renal function.    Care per SICU  PICC/TPN 12/4  Team 1C will continue to follow

## 2023-12-03 NOTE — PROGRESS NOTE ADULT - SUBJECTIVE AND OBJECTIVE BOX
INTERVAL HPI/OVERNIGHT EVENTS: AMRITA, VSS     STATUS POST:    6/27: Laparoscopic lysis of adhesions, converted to open lysis of adhesions, SBR x 3, temporary abdominal closure, 5L cryst, 1L 5% alb, 3 pRBC, 1 FFP, 1 plts  6/28: ex lap, removal of abthera, ileocolic resection, small bowel anastamosis, , 1.6 crystalloid, 250 5%, 175 uop   7/3: IR Gendel drained perihepatic aspiration of serous fluid.   7/5: RTOR exlap, washout, ileocolic resection with end ileostomy, blow hole colostomy, fistula, red rubber from ileostomy to small bowel anastomosis; vicryl bridging mesh; R JOYCE below ileostomy, L JOYCE at small bowel enterotomy repair; 500 LR, 500 5% albumin, 3u PRBC, 2 FFP, 400 UOP,   9/11: s/p perc cony    SUBJECTIVE: Pt seen and examined at bedside this am by surgery team. Denies f/n/v/cp/sob.    MEDICATIONS  (STANDING):  chlorhexidine 2% Cloths 1 Application(s) Topical <User Schedule>  cholestyramine Powder (Sugar-Free) 4 Gram(s) Oral daily  dextrose 10% + sodium chloride 0.45% 1000 milliLiter(s) (110 mL/Hr) IV Continuous <Continuous>  epoetin matt-epbx (RETACRIT) Injectable 15682 Unit(s) SubCutaneous every 7 days  ergocalciferol 39698 Unit(s) Oral <User Schedule>  famotidine Injectable 20 milliGRAM(s) IV Push daily  fluconAZOLE IVPB 600 milliGRAM(s) IV Intermittent every 24 hours  folic acid Injectable 1 milliGRAM(s) IV Push daily  glucagon  Injectable 1 milliGRAM(s) IntraMuscular once  heparin   Injectable 5000 Unit(s) SubCutaneous every 8 hours  hydrALAZINE Injectable 20 milliGRAM(s) IV Push every 8 hours  levothyroxine Injectable 30 MICROGram(s) IV Push at bedtime  metoprolol tartrate Injectable 5 milliGRAM(s) IV Push every 6 hours  octreotide  Injectable 100 MICROGram(s) IV Push three times a day  thiamine Injectable 100 milliGRAM(s) IV Push every 24 hours  ursodiol Suspension 300 milliGRAM(s) Oral every 8 hours  venlafaxine XR. 150 milliGRAM(s) Oral two times a day    MEDICATIONS  (PRN):  LORazepam   Injectable 0.5 milliGRAM(s) IV Push at bedtime PRN Anxiety  ondansetron Injectable 4 milliGRAM(s) IV Push every 6 hours PRN Nausea and/or Vomiting      Vital Signs Last 24 Hrs  T(C): 36.7 (03 Dec 2023 09:06), Max: 36.7 (03 Dec 2023 09:06)  T(F): 98 (03 Dec 2023 09:06), Max: 98 (03 Dec 2023 09:06)  HR: 90 (03 Dec 2023 09:00) (87 - 90)  BP: 157/74 (03 Dec 2023 09:00) (139/64 - 171/74)  BP(mean): 107 (03 Dec 2023 09:00) (92 - 114)  RR: 20 (03 Dec 2023 09:00) (16 - 28)  SpO2: 95% (03 Dec 2023 09:00) (93% - 99%)    Parameters below as of 03 Dec 2023 09:00  Patient On (Oxygen Delivery Method): room air    PHYSICAL EXAM:    General: NAD, jaundiced  Pulmonary: Nonlabored breathing, no respiratory distress  Cardiovascular: NSR, no murmurs  Abdominal: abdomen soft, NT/ND, Dustin in place, ileostomy and red rubber in place  Extremities: WWP, no calf tenderness or edema.  Neuro: A/O x3, CNs II-XII grossly intact, normal motor/sensation, no focal deficits    I&O's Detail    02 Dec 2023 07:01  -  03 Dec 2023 07:00  --------------------------------------------------------  IN:    Albumin 25%  -  50 mL: 150 mL    dextrose 10% + sodium chloride 0.45% w/ Additives: 2520 mL    IV PiggyBack: 300 mL    Oral Fluid: 720 mL  Total IN: 3690 mL    OUT:    Drain (mL): 30 mL    Drain (mL): 715 mL    Drain (mL): 1850 mL    Voided (mL): 1125 mL  Total OUT: 3720 mL    Total NET: -30 mL      03 Dec 2023 07:01  -  03 Dec 2023 09:49  --------------------------------------------------------  IN:    dextrose 10% + sodium chloride 0.45% w/ Additives: 220 mL  Total IN: 220 mL    OUT:  Total OUT: 0 mL    Total NET: 220 mL          LABS:                        7.9    8.91  )-----------( 218      ( 03 Dec 2023 05:57 )             24.4     12-03    139  |  109<H>  |  22  ----------------------------<  114<H>  3.9   |  19<L>  |  2.59<H>    Ca    8.9      03 Dec 2023 05:57  Phos  3.1     12-03  Mg     1.6     12-03    TPro  8.0  /  Alb  3.0<L>  /  TBili  7.4<H>  /  DBili  4.8<H>  /  AST  84<H>  /  ALT  61<H>  /  AlkPhos  178<H>  12-03    PT/INR - ( 02 Dec 2023 03:16 )   PT: 14.8 sec;   INR: 1.31          PTT - ( 02 Dec 2023 03:16 )  PTT:42.1 sec  Urinalysis Basic - ( 03 Dec 2023 05:57 )    Color: x / Appearance: x / SG: x / pH: x  Gluc: 114 mg/dL / Ketone: x  / Bili: x / Urobili: x   Blood: x / Protein: x / Nitrite: x   Leuk Esterase: x / RBC: x / WBC x   Sq Epi: x / Non Sq Epi: x / Bacteria: x        RADIOLOGY & ADDITIONAL STUDIES: INTERVAL HPI/OVERNIGHT EVENTS: AMRITA, VSS     STATUS POST:    6/27: Laparoscopic lysis of adhesions, converted to open lysis of adhesions, SBR x 3, temporary abdominal closure, 5L cryst, 1L 5% alb, 3 pRBC, 1 FFP, 1 plts  6/28: ex lap, removal of abthera, ileocolic resection, small bowel anastamosis, , 1.6 crystalloid, 250 5%, 175 uop   7/3: IR Gendel drained perihepatic aspiration of serous fluid.   7/5: RTOR exlap, washout, ileocolic resection with end ileostomy, blow hole colostomy, fistula, red rubber from ileostomy to small bowel anastomosis; vicryl bridging mesh; R JOYCE below ileostomy, L JOYCE at small bowel enterotomy repair; 500 LR, 500 5% albumin, 3u PRBC, 2 FFP, 400 UOP,   9/11: s/p perc cony    SUBJECTIVE: Pt seen and examined at bedside this am by surgery team. Denies f/n/v/cp/sob.    MEDICATIONS  (STANDING):  chlorhexidine 2% Cloths 1 Application(s) Topical <User Schedule>  cholestyramine Powder (Sugar-Free) 4 Gram(s) Oral daily  dextrose 10% + sodium chloride 0.45% 1000 milliLiter(s) (110 mL/Hr) IV Continuous <Continuous>  epoetin matt-epbx (RETACRIT) Injectable 90377 Unit(s) SubCutaneous every 7 days  ergocalciferol 95953 Unit(s) Oral <User Schedule>  famotidine Injectable 20 milliGRAM(s) IV Push daily  fluconAZOLE IVPB 600 milliGRAM(s) IV Intermittent every 24 hours  folic acid Injectable 1 milliGRAM(s) IV Push daily  glucagon  Injectable 1 milliGRAM(s) IntraMuscular once  heparin   Injectable 5000 Unit(s) SubCutaneous every 8 hours  hydrALAZINE Injectable 20 milliGRAM(s) IV Push every 8 hours  levothyroxine Injectable 30 MICROGram(s) IV Push at bedtime  metoprolol tartrate Injectable 5 milliGRAM(s) IV Push every 6 hours  octreotide  Injectable 100 MICROGram(s) IV Push three times a day  thiamine Injectable 100 milliGRAM(s) IV Push every 24 hours  ursodiol Suspension 300 milliGRAM(s) Oral every 8 hours  venlafaxine XR. 150 milliGRAM(s) Oral two times a day    MEDICATIONS  (PRN):  LORazepam   Injectable 0.5 milliGRAM(s) IV Push at bedtime PRN Anxiety  ondansetron Injectable 4 milliGRAM(s) IV Push every 6 hours PRN Nausea and/or Vomiting      Vital Signs Last 24 Hrs  T(C): 36.7 (03 Dec 2023 09:06), Max: 36.7 (03 Dec 2023 09:06)  T(F): 98 (03 Dec 2023 09:06), Max: 98 (03 Dec 2023 09:06)  HR: 90 (03 Dec 2023 09:00) (87 - 90)  BP: 157/74 (03 Dec 2023 09:00) (139/64 - 171/74)  BP(mean): 107 (03 Dec 2023 09:00) (92 - 114)  RR: 20 (03 Dec 2023 09:00) (16 - 28)  SpO2: 95% (03 Dec 2023 09:00) (93% - 99%)    Parameters below as of 03 Dec 2023 09:00  Patient On (Oxygen Delivery Method): room air    PHYSICAL EXAM:    General: NAD, jaundiced  Pulmonary: Nonlabored breathing, no respiratory distress  Cardiovascular: NSR, no murmurs  Abdominal: abdomen soft, NT/ND, Dustin in place, ileostomy and red rubber in place  Extremities: WWP, no calf tenderness or edema.  Neuro: A/O x3, CNs II-XII grossly intact, normal motor/sensation, no focal deficits    I&O's Detail    02 Dec 2023 07:01  -  03 Dec 2023 07:00  --------------------------------------------------------  IN:    Albumin 25%  -  50 mL: 150 mL    dextrose 10% + sodium chloride 0.45% w/ Additives: 2520 mL    IV PiggyBack: 300 mL    Oral Fluid: 720 mL  Total IN: 3690 mL    OUT:    Drain (mL): 30 mL    Drain (mL): 715 mL    Drain (mL): 1850 mL    Voided (mL): 1125 mL  Total OUT: 3720 mL    Total NET: -30 mL      03 Dec 2023 07:01  -  03 Dec 2023 09:49  --------------------------------------------------------  IN:    dextrose 10% + sodium chloride 0.45% w/ Additives: 220 mL  Total IN: 220 mL    OUT:  Total OUT: 0 mL    Total NET: 220 mL          LABS:                        7.9    8.91  )-----------( 218      ( 03 Dec 2023 05:57 )             24.4     12-03    139  |  109<H>  |  22  ----------------------------<  114<H>  3.9   |  19<L>  |  2.59<H>    Ca    8.9      03 Dec 2023 05:57  Phos  3.1     12-03  Mg     1.6     12-03    TPro  8.0  /  Alb  3.0<L>  /  TBili  7.4<H>  /  DBili  4.8<H>  /  AST  84<H>  /  ALT  61<H>  /  AlkPhos  178<H>  12-03    PT/INR - ( 02 Dec 2023 03:16 )   PT: 14.8 sec;   INR: 1.31          PTT - ( 02 Dec 2023 03:16 )  PTT:42.1 sec  Urinalysis Basic - ( 03 Dec 2023 05:57 )    Color: x / Appearance: x / SG: x / pH: x  Gluc: 114 mg/dL / Ketone: x  / Bili: x / Urobili: x   Blood: x / Protein: x / Nitrite: x   Leuk Esterase: x / RBC: x / WBC x   Sq Epi: x / Non Sq Epi: x / Bacteria: x        RADIOLOGY & ADDITIONAL STUDIES: INTERVAL HPI/OVERNIGHT EVENTS: AMRITA, VSS     STATUS POST:    6/27: Laparoscopic lysis of adhesions, converted to open lysis of adhesions, SBR x 3, temporary abdominal closure, 5L cryst, 1L 5% alb, 3 pRBC, 1 FFP, 1 plts  6/28: ex lap, removal of abthera, ileocolic resection, small bowel anastamosis, , 1.6 crystalloid, 250 5%, 175 uop   7/3: IR Gendel drained perihepatic aspiration of serous fluid.   7/5: RTOR exlap, washout, ileocolic resection with end ileostomy, blow hole colostomy, fistula, red rubber from ileostomy to small bowel anastomosis; vicryl bridging mesh; R JOYCE below ileostomy, L JOYCE at small bowel enterotomy repair; 500 LR, 500 5% albumin, 3u PRBC, 2 FFP, 400 UOP,   9/11: s/p perc cony    SUBJECTIVE: Pt seen and examined at bedside this am by surgery team. Denies f/n/v/cp/sob.    MEDICATIONS  (STANDING):  chlorhexidine 2% Cloths 1 Application(s) Topical <User Schedule>  cholestyramine Powder (Sugar-Free) 4 Gram(s) Oral daily  dextrose 10% + sodium chloride 0.45% 1000 milliLiter(s) (110 mL/Hr) IV Continuous <Continuous>  epoetin matt-epbx (RETACRIT) Injectable 17919 Unit(s) SubCutaneous every 7 days  ergocalciferol 72359 Unit(s) Oral <User Schedule>  famotidine Injectable 20 milliGRAM(s) IV Push daily  fluconAZOLE IVPB 600 milliGRAM(s) IV Intermittent every 24 hours  folic acid Injectable 1 milliGRAM(s) IV Push daily  glucagon  Injectable 1 milliGRAM(s) IntraMuscular once  heparin   Injectable 5000 Unit(s) SubCutaneous every 8 hours  hydrALAZINE Injectable 20 milliGRAM(s) IV Push every 8 hours  levothyroxine Injectable 30 MICROGram(s) IV Push at bedtime  metoprolol tartrate Injectable 5 milliGRAM(s) IV Push every 6 hours  octreotide  Injectable 100 MICROGram(s) IV Push three times a day  thiamine Injectable 100 milliGRAM(s) IV Push every 24 hours  ursodiol Suspension 300 milliGRAM(s) Oral every 8 hours  venlafaxine XR. 150 milliGRAM(s) Oral two times a day    MEDICATIONS  (PRN):  LORazepam   Injectable 0.5 milliGRAM(s) IV Push at bedtime PRN Anxiety  ondansetron Injectable 4 milliGRAM(s) IV Push every 6 hours PRN Nausea and/or Vomiting      Vital Signs Last 24 Hrs  T(C): 36.7 (03 Dec 2023 09:06), Max: 36.7 (03 Dec 2023 09:06)  T(F): 98 (03 Dec 2023 09:06), Max: 98 (03 Dec 2023 09:06)  HR: 90 (03 Dec 2023 09:00) (87 - 90)  BP: 157/74 (03 Dec 2023 09:00) (139/64 - 171/74)  BP(mean): 107 (03 Dec 2023 09:00) (92 - 114)  RR: 20 (03 Dec 2023 09:00) (16 - 28)  SpO2: 95% (03 Dec 2023 09:00) (93% - 99%)    Parameters below as of 03 Dec 2023 09:00  Patient On (Oxygen Delivery Method): room air    PHYSICAL EXAM:    General: NAD, jaundiced  Pulmonary: Nonlabored breathing, no respiratory distress  Cardiovascular: NSR, no murmurs  Abdominal: abdomen soft, NT/ND, Dustin in place, ileostomy and red rubber in place  Extremities: WWP, no calf tenderness or edema.  Neuro: A/O x3, CNs II-XII grossly intact, normal motor/sensation, no focal deficits    I&O's Detail    02 Dec 2023 07:01  -  03 Dec 2023 07:00  --------------------------------------------------------  IN:    Albumin 25%  -  50 mL: 150 mL    dextrose 10% + sodium chloride 0.45% w/ Additives: 2520 mL    IV PiggyBack: 300 mL    Oral Fluid: 720 mL  Total IN: 3690 mL    OUT:    Drain (mL): 30 mL    Drain (mL): 715 mL    Drain (mL): 1850 mL    Voided (mL): 1125 mL  Total OUT: 3720 mL    Total NET: -30 mL      03 Dec 2023 07:01  -  03 Dec 2023 09:49  --------------------------------------------------------  IN:    dextrose 10% + sodium chloride 0.45% w/ Additives: 220 mL  Total IN: 220 mL    OUT:  Total OUT: 0 mL    Total NET: 220 mL          LABS:                        7.9    8.91  )-----------( 218      ( 03 Dec 2023 05:57 )             24.4     12-03    139  |  109<H>  |  22  ----------------------------<  114<H>  3.9   |  19<L>  |  2.59<H>    Ca    8.9      03 Dec 2023 05:57  Phos  3.1     12-03  Mg     1.6     12-03    TPro  8.0  /  Alb  3.0<L>  /  TBili  7.4<H>  /  DBili  4.8<H>  /  AST  84<H>  /  ALT  61<H>  /  AlkPhos  178<H>  12-03    PT/INR - ( 02 Dec 2023 03:16 )   PT: 14.8 sec;   INR: 1.31          PTT - ( 02 Dec 2023 03:16 )  PTT:42.1 sec  Urinalysis Basic - ( 03 Dec 2023 05:57 )    Color: x / Appearance: x / SG: x / pH: x  Gluc: 114 mg/dL / Ketone: x  / Bili: x / Urobili: x   Blood: x / Protein: x / Nitrite: x   Leuk Esterase: x / RBC: x / WBC x   Sq Epi: x / Non Sq Epi: x / Bacteria: x        RADIOLOGY & ADDITIONAL STUDIES:

## 2023-12-03 NOTE — PROGRESS NOTE ADULT - SUBJECTIVE AND OBJECTIVE BOX
Sleeping well. Eating well  AFVSS  Abd soft, ND, NT.  Fistula output contained well.  Drain bilious    Cr 2.6    A/P: Resolving sepsis, ELVI. Entero-atmospheric fistula controlled.  1. Continue diet  2. PICC & TPN tomorrow  3. OOB ambulating  4. Follow Cr, avoid nephrotoxic medications  5. On antifungal therapy.

## 2023-12-03 NOTE — PROGRESS NOTE ADULT - SUBJECTIVE AND OBJECTIVE BOX
Interval Events:  Patient seen and examined at bedside.      Allergies    penicillin (Angioedema)    Intolerances        Vital Signs Last 24 Hrs  T(C): 36.7 (03 Dec 2023 09:06), Max: 36.7 (03 Dec 2023 09:06)  T(F): 98 (03 Dec 2023 09:06), Max: 98 (03 Dec 2023 09:06)  HR: 90 (03 Dec 2023 09:00) (87 - 90)  BP: 157/74 (03 Dec 2023 09:00) (139/64 - 171/74)  BP(mean): 107 (03 Dec 2023 09:00) (92 - 114)  RR: 20 (03 Dec 2023 09:00) (16 - 28)  SpO2: 95% (03 Dec 2023 09:00) (93% - 99%)    Parameters below as of 03 Dec 2023 09:00  Patient On (Oxygen Delivery Method): room air        12-02 @ 07:01  -  12-03 @ 07:00  --------------------------------------------------------  IN: 3690 mL / OUT: 3720 mL / NET: -30 mL    12-03 @ 07:01  -  12-03 @ 10:01  --------------------------------------------------------  IN: 220 mL / OUT: 0 mL / NET: 220 mL      12-02 @ 07:01  -  12-03 @ 07:00  --------------------------------------------------------  IN: 3690 mL / OUT: 3720 mL / NET: -30 mL    12-03 @ 07:01  -  12-03 @ 10:01  --------------------------------------------------------  IN: 220 mL / OUT: 0 mL / NET: 220 mL        Physical Exam:     General: NAD, mildly jaundiced  Neuro: A/O x3, CNs II-XII grossly intact, normal motor/sensation, no focal deficits  HEENT: NC/AT, EOMI, PERRLA, normal hearing, no oral lesions, neck supple w/o LAD  Pulmonary: Nonlabored breathing, no respiratory distress, CTA-B  Cardiovascular: NSR, no murmurs  Abdominal: soft, NT/ND, Dustin in place draining brown liquid. ileostomy with red rubber in place  Extremities: WWP, 5/5 strength x 4, no clubbing/cyanosis/edema  Pulses: palpable distal pulses  Skin: no rashes noted  MSK: No joint swelling  Psych: No signs of anxiety or depression      LABS:      CBC Full  -  ( 03 Dec 2023 05:57 )  WBC Count : 8.91 K/uL  RBC Count : 2.53 M/uL  Hemoglobin : 7.9 g/dL  Hematocrit : 24.4 %  Platelet Count - Automated : 218 K/uL  Mean Cell Volume : 96.4 fl  Mean Cell Hemoglobin : 31.2 pg  Mean Cell Hemoglobin Concentration : 32.4 gm/dL  Auto Neutrophil # : x  Auto Lymphocyte # : x  Auto Monocyte # : x  Auto Eosinophil # : x  Auto Basophil # : x  Auto Neutrophil % : x  Auto Lymphocyte % : x  Auto Monocyte % : x  Auto Eosinophil % : x  Auto Basophil % : x    12-03    139  |  109<H>  |  22  ----------------------------<  114<H>  3.9   |  19<L>  |  2.59<H>    Ca    8.9      03 Dec 2023 05:57  Phos  3.1     12-03  Mg     1.6     12-03    TPro  8.0  /  Alb  3.0<L>  /  TBili  7.4<H>  /  DBili  4.8<H>  /  AST  84<H>  /  ALT  61<H>  /  AlkPhos  178<H>  12-03    PT/INR - ( 02 Dec 2023 03:16 )   PT: 14.8 sec;   INR: 1.31          PTT - ( 02 Dec 2023 03:16 )  PTT:42.1 sec      Urinalysis Basic - ( 03 Dec 2023 05:57 )    Color: x / Appearance: x / SG: x / pH: x  Gluc: 114 mg/dL / Ketone: x  / Bili: x / Urobili: x   Blood: x / Protein: x / Nitrite: x   Leuk Esterase: x / RBC: x / WBC x   Sq Epi: x / Non Sq Epi: x / Bacteria: x              RADIOLOGY & ADDITIONAL STUDIES (The following images were personally reviewed):          A/p: 78yMaleA/p: 78M w PMH Crohn's, AFib/Flutter s/p DCCVs on amiodarone, remote ileocectomy and open appendectomy. Admitted (6/23) for SBO vs Crohns flare, s/p NGT decompression and s/p lap converted to open TRE, SBR x 3, left in discontinuity with abthera vac on (6/27), RTOR for ileocolic resection, small bowel anastomosis, and abdominal wall closure on (6/28), c/b fluid collection s/p IR aspiration of perihepatic fluid on (7/3), c/b wound dehiscence s/p RTOR exlap, washout, ileocolic resection with end ileostomy, blow hole colostomy, red rubber from ileostomy to small bowel anastomosis; vicryl bridging mesh on (7/5) transferred to SICU postoperatively for hemodynamic monitoring, with hospital course complicated by periods of severe ELVI and hyponatremia, which resolved but stepped back up for to SICU on (9/10) for acute AMS, intermittent hypoglycemia, AFib with RVR. Percutaneous Cholecystostomy placed on (9/11) for enlarged GB, PCT capped 10/5 and uncapped 10/25 for hyperbilirubinemia, Right brachial DVT, left basillic and cephalic superficial thrombus on duplex 11/2, CT 11/14 with ALDEN ground glass opacity of unclear etiology, completed empiric 7day cefepime course 11/22, rising T-bili of unclear etilogy, now w fungemia (candida glabrata) CLABSI, and worsening renal function.    NEURO: AMS resolved, likely septic encephalopathy, EEG neg. Hx of depression - cont Effexor. Nausea: Zofran PRN. Anxiety: Lorazepam PRN.  CV: Hx Afib/flutter: s/p DCCV, Amio stopped due to persistent transaminitis. Continue Metoprolol 5q6 with hold parameters. Hx HLD: Lipitor held given high LFTs. TTE  (7/18) - PASP 64mmHg, EF 65-70%, HTN -  Increase hydralazine IV to 20 q6, holding Losartan. Appears euvolemic on exam. F/u EKG today for QTC while pt on fluconazole.   PULM: CT from 11/14 with ALDEN ground glass opacity of unclear etiology. COVID negative. RVP negative. completed 7d empiric cefepime 11/22 to cover for potential PNA. Now on RA.   GI/FEN: Low res, low fat, high protein diet. Cont Ensure max x1/day. 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 will replace PICC line on 12/4 and restart TPN.  High output EC fistula: cont Octreotide,& Cholestyramine 10/18. Transaminitis elevated T bili of unclear origin--consult to Team 1 Sgy, s/p Perc Deb on 9/11; MRCP 10/30 no obstruction, seen by Hepatology started on ursodiol, RUQ US 11/25 CBD only 5mm, hepatic vessels patent  Requesting repeat Duplex w portal venous and arterial system, as well as image of ampulla. MRCP neg, cont PPI. Monitor drainage from fistula, bolus as needed to keep I&O even. Ensure once daily with LPS. On D10NS@100 for now while holding TPN. Folate, thiamine added.  : Voids. ELVI, Cr peaking, Urine lytes FeUrea Intrinsic ATN, unlikely volume down as spec grav low and good UOP. MARLO Duplex pending, RP US pending, CK wnl, Renal consult placed.   ENDO: Hx hypothyroid: IV Synthroid, TSH wnl on 10/23  ID: Per ID, unlikely Caspo leading to ELVI, so will continue course. Chest CT with ALDEN with ground glass opacities, unclear etiology. bld cx from 11/22 grew candida glabrata. likely CLABSI. PICC removed 11/24.  ID consulted Cont Fluconazole ( 12/1-) Repeat surveillance blood cx NGTD. ophthalmology evaulated - normal ocular exam. echo no vegetation, will replace picc line on 12/4.  off cefepime/flagyl. recent MRSA swab negative, RVP panel negative, COVID negative this past week. Cont Nystatin powder // PREVIOUSLY   caspofungin (11/24-12/1). completed empiric 7days cefepime (11/15-11/22), had C. tertium, Lactobacillis from his IR cx 7/3, and candida albicans, lactobacillus, vanc sensitive E faecium, vanc resistant E gallinarum, vanc resistant E casseliflavus, lactobacillus from his OR cx 7/5. Completed course of abx with Imipenem (9/10-9/15). Imipenem (8/26--) imipenem (6/30-7/12, 7/23-7/30), Dapto (6/30-7/5 and 7/23-7/24). Empiric dapto (8/23-24) and cefepime (8/23-24).   PPX: SCDs, SQH, was on Therapeutic lovenox bid for R brachial vein DVT, but will hold off on hep gtt since repeat US Duplex negative.  HEME: Anemia: continue Epogen weekly. S/p Iron Sucrose 200 qd x 3 days for chronic anemia.  LINES: PIVs // DC: LUE PICC (9/1-11/1 ), RUE PICC: (11/1-11/24)   WOUNDS/DRAINS: Abdominal wound and fistula with wound manager in place dressing change Tuesday and Friday. RLQ Ostomy. IR perc deb tube. // DC: L Clay drain.  PT: Ambulate as tolerated   DISPO: SICU due to complicated hospital course.   Interval Events:  Patient seen and examined at bedside.  No SOB or CP    Allergies    penicillin (Angioedema)    Intolerances        Vital Signs Last 24 Hrs  T(C): 36.7 (03 Dec 2023 09:06), Max: 36.7 (03 Dec 2023 09:06)  T(F): 98 (03 Dec 2023 09:06), Max: 98 (03 Dec 2023 09:06)  HR: 90 (03 Dec 2023 09:00) (87 - 90)  BP: 157/74 (03 Dec 2023 09:00) (139/64 - 171/74)  BP(mean): 107 (03 Dec 2023 09:00) (92 - 114)  RR: 20 (03 Dec 2023 09:00) (16 - 28)  SpO2: 95% (03 Dec 2023 09:00) (93% - 99%)    Parameters below as of 03 Dec 2023 09:00  Patient On (Oxygen Delivery Method): room air        12-02 @ 07:01  -  12-03 @ 07:00  --------------------------------------------------------  IN: 3690 mL / OUT: 3720 mL / NET: -30 mL    12-03 @ 07:01  -  12-03 @ 10:01  --------------------------------------------------------  IN: 220 mL / OUT: 0 mL / NET: 220 mL      12-02 @ 07:01  -  12-03 @ 07:00  --------------------------------------------------------  IN: 3690 mL / OUT: 3720 mL / NET: -30 mL    12-03 @ 07:01  -  12-03 @ 10:01  --------------------------------------------------------  IN: 220 mL / OUT: 0 mL / NET: 220 mL        Physical Exam:     General: NAD, mildly jaundiced  Neuro: A/O x3, CNs II-XII grossly intact, normal motor/sensation, no focal deficits  HEENT: NC/AT, EOMI, PERRLA, normal hearing, no oral lesions, neck supple w/o LAD  Pulmonary: Nonlabored breathing, no respiratory distress, CTA-B  Cardiovascular: NSR, no murmurs  Abdominal: soft, NT/ND, Dustin in place draining brown liquid. ileostomy with red rubber in place  Extremities: WWP, 5/5 strength x 4, no clubbing/cyanosis/edema  Pulses: palpable distal pulses  Skin: no rashes noted  MSK: No joint swelling  Psych: No signs of anxiety or depression      LABS:      CBC Full  -  ( 03 Dec 2023 05:57 )  WBC Count : 8.91 K/uL  RBC Count : 2.53 M/uL  Hemoglobin : 7.9 g/dL  Hematocrit : 24.4 %  Platelet Count - Automated : 218 K/uL  Mean Cell Volume : 96.4 fl  Mean Cell Hemoglobin : 31.2 pg  Mean Cell Hemoglobin Concentration : 32.4 gm/dL  Auto Neutrophil # : x  Auto Lymphocyte # : x  Auto Monocyte # : x  Auto Eosinophil # : x  Auto Basophil # : x  Auto Neutrophil % : x  Auto Lymphocyte % : x  Auto Monocyte % : x  Auto Eosinophil % : x  Auto Basophil % : x    12-03    139  |  109<H>  |  22  ----------------------------<  114<H>  3.9   |  19<L>  |  2.59<H>    Ca    8.9      03 Dec 2023 05:57  Phos  3.1     12-03  Mg     1.6     12-03    TPro  8.0  /  Alb  3.0<L>  /  TBili  7.4<H>  /  DBili  4.8<H>  /  AST  84<H>  /  ALT  61<H>  /  AlkPhos  178<H>  12-03    PT/INR - ( 02 Dec 2023 03:16 )   PT: 14.8 sec;   INR: 1.31          PTT - ( 02 Dec 2023 03:16 )  PTT:42.1 sec      Urinalysis Basic - ( 03 Dec 2023 05:57 )    Color: x / Appearance: x / SG: x / pH: x  Gluc: 114 mg/dL / Ketone: x  / Bili: x / Urobili: x   Blood: x / Protein: x / Nitrite: x   Leuk Esterase: x / RBC: x / WBC x   Sq Epi: x / Non Sq Epi: x / Bacteria: x              RADIOLOGY & ADDITIONAL STUDIES (The following images were personally reviewed):          A/p: 78yMaleA/p: 78M w PMH Crohn's, AFib/Flutter s/p DCCVs on amiodarone, remote ileocectomy and open appendectomy. Admitted (6/23) for SBO vs Crohns flare, s/p NGT decompression and s/p lap converted to open TRE, SBR x 3, left in discontinuity with abthera vac on (6/27), RTOR for ileocolic resection, small bowel anastomosis, and abdominal wall closure on (6/28), c/b fluid collection s/p IR aspiration of perihepatic fluid on (7/3), c/b wound dehiscence s/p RTOR exlap, washout, ileocolic resection with end ileostomy, blow hole colostomy, red rubber from ileostomy to small bowel anastomosis; vicryl bridging mesh on (7/5) transferred to SICU postoperatively for hemodynamic monitoring, with hospital course complicated by periods of severe ELVI and hyponatremia, which resolved but stepped back up for to SICU on (9/10) for acute AMS, intermittent hypoglycemia, AFib with RVR. Percutaneous Cholecystostomy placed on (9/11) for enlarged GB, PCT capped 10/5 and uncapped 10/25 for hyperbilirubinemia, Right brachial DVT, left basillic and cephalic superficial thrombus on duplex 11/2, CT 11/14 with ALDEN ground glass opacity of unclear etiology, completed empiric 7day cefepime course 11/22, rising T-bili of unclear etilogy, now w fungemia (candida glabrata) CLABSI, and worsening renal function.    NEURO: AMS resolved, likely septic encephalopathy, EEG neg. Hx of depression - cont Effexor. Nausea: Zofran PRN. Anxiety: Lorazepam PRN.  CV: Hx Afib/flutter: s/p DCCV, Amio stopped due to persistent transaminitis. Continue Metoprolol 5q6 with hold parameters. Hx HLD: Lipitor held given high LFTs. TTE  (7/18) - PASP 64mmHg, EF 65-70%, HTN -  Increase hydralazine IV to 20 q6, holding Losartan. Appears euvolemic on exam. F/u EKG today for QTC while pt on fluconazole.   PULM: CT from 11/14 with ALDEN ground glass opacity of unclear etiology. COVID negative. RVP negative. completed 7d empiric cefepime 11/22 to cover for potential PNA. Now on RA.   GI/FEN: Low res, low fat, high protein diet. Cont Ensure max x1/day. 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 will replace PICC line on 12/4 and restart TPN.  High output EC fistula: cont Octreotide,& Cholestyramine 10/18. Transaminitis elevated T bili of unclear origin--consult to Team 1 Sgy, s/p Perc Deb on 9/11; MRCP 10/30 no obstruction, seen by Hepatology started on ursodiol, RUQ US 11/25 CBD only 5mm, hepatic vessels patent  Requesting repeat Duplex w portal venous and arterial system, as well as image of ampulla. MRCP neg, cont PPI. Monitor drainage from fistula, bolus as needed to keep I&O even. Ensure once daily with LPS. On D10NS@100 for now while holding TPN. Folate, thiamine added.  : Voids. ELVI, Cr peaking, Urine lytes FeUrea Intrinsic ATN, unlikely volume down as spec grav low and good UOP. MARLO Duplex pending, RP US pending, CK wnl, Renal consult placed.   ENDO: Hx hypothyroid: IV Synthroid, TSH wnl on 10/23  ID: Per ID, unlikely Caspo leading to ELVI, so will continue course. Chest CT with ALDEN with ground glass opacities, unclear etiology. bld cx from 11/22 grew candida glabrata. likely CLABSI. PICC removed 11/24.  ID consulted Cont Fluconazole ( 12/1-) Repeat surveillance blood cx NGTD. ophthalmology evaulated - normal ocular exam. echo no vegetation, will replace picc line on 12/4.  off cefepime/flagyl. recent MRSA swab negative, RVP panel negative, COVID negative this past week. Cont Nystatin powder // PREVIOUSLY   caspofungin (11/24-12/1). completed empiric 7days cefepime (11/15-11/22), had C. tertium, Lactobacillis from his IR cx 7/3, and candida albicans, lactobacillus, vanc sensitive E faecium, vanc resistant E gallinarum, vanc resistant E casseliflavus, lactobacillus from his OR cx 7/5. Completed course of abx with Imipenem (9/10-9/15). Imipenem (8/26--) imipenem (6/30-7/12, 7/23-7/30), Dapto (6/30-7/5 and 7/23-7/24). Empiric dapto (8/23-24) and cefepime (8/23-24).   PPX: SCDs, SQH, was on Therapeutic lovenox bid for R brachial vein DVT, but will hold off on hep gtt since repeat US Duplex negative.  HEME: Anemia: continue Epogen weekly. S/p Iron Sucrose 200 qd x 3 days for chronic anemia.  LINES: PIVs // DC: LUE PICC (9/1-11/1 ), RUE PICC: (11/1-11/24)   WOUNDS/DRAINS: Abdominal wound and fistula with wound manager in place dressing change Tuesday and Friday. RLQ Ostomy. IR perc deb tube. // DC: L Clay drain.  PT: Ambulate as tolerated   DISPO: SICU due to complicated hospital course.

## 2023-12-04 NOTE — PROGRESS NOTE ADULT - ASSESSMENT
79 yo M with prolonged hospital course, admited for SBO and CD flare s/p multiple GI surgeries with wound dehiscense, ileostomy EC fistula, PCT placement with worsening LFTs, recurrent ELVI in the setting of candidemia. Nephrology reconsulted for ELVI.    Imp: ELVI, suspect hemodynamic/septic ATN. Will also r/o GN with recurrent hematuria and paraprotein-related renal disease.    Plan:  - SCr peaked at 2.9, now downtrending to 2.3. Baseline ~0.9.  - Mild proteinuria 0.8 UPCR  - No hydronephrosis on US  - Urine with bence-stone protein, consistent with myeloma  - Awaiting serum free light chains  - Awaiting GN workup - ANCA, mpo, pr3, complement C3/C4, anti-glomerular baseline membrane, cryoglobulins  - Currently on Fluconazole for candidemia.  - Continue IV fluids as tolerated - would consider reducing rate to maintain slightly net negative fluid balance at this point given LE edema.  - Bicarb improved  - Daily BMP  - Maintain MAP >65 for renal perfusion  - Strict I&O, daily weights 77 yo M with prolonged hospital course, admited for SBO and CD flare s/p multiple GI surgeries with wound dehiscense, ileostomy EC fistula, PCT placement with worsening LFTs, recurrent ELVI in the setting of candidemia. Nephrology reconsulted for ELVI.    Imp: ELVI, suspect hemodynamic/septic ATN. Will also r/o GN with recurrent hematuria and paraprotein-related renal disease.    Plan:  - SCr peaked at 2.9, now downtrending to 2.3. Baseline ~0.9.  - Mild proteinuria 0.8 UPCR  - No hydronephrosis on US  - Urine with bence-stone protein  - Awaiting serum free light chains  - Awaiting GN workup - ANCA, mpo, pr3, complement C3/C4, anti-glomerular baseline membrane, cryoglobulins-- but these less likely with recovery   - Currently on Fluconazole for candidemia.  - Continue IV fluids as tolerated - would consider reducing rate to maintain slightly net negative fluid balance at this point given LE edema and HTN   - Bicarb improved  - Daily BMP  - Maintain MAP >65 for renal perfusion  - Strict I&O, daily weights

## 2023-12-04 NOTE — PROGRESS NOTE ADULT - SUBJECTIVE AND OBJECTIVE BOX
Nephrology progress note    Seen at bedside, feeling well, no acute complaints. Cr improving.    Allergies:  penicillin (Angioedema)    Hospital Medications:   MEDICATIONS  (STANDING):  chlorhexidine 2% Cloths 1 Application(s) Topical <User Schedule>  cholestyramine Powder (Sugar-Free) 4 Gram(s) Oral daily  dextrose 10% + sodium chloride 0.9%. 1000 milliLiter(s) (110 mL/Hr) IV Continuous <Continuous>  epoetin matt-epbx (RETACRIT) Injectable 51425 Unit(s) SubCutaneous every 7 days  ergocalciferol 25731 Unit(s) Oral <User Schedule>  famotidine Injectable 20 milliGRAM(s) IV Push daily  fluconAZOLE IVPB 600 milliGRAM(s) IV Intermittent every 24 hours  folic acid Injectable 1 milliGRAM(s) IV Push daily  glucagon  Injectable 1 milliGRAM(s) IntraMuscular once  heparin   Injectable 5000 Unit(s) SubCutaneous every 8 hours  hydrALAZINE Injectable 40 milliGRAM(s) IV Push every 6 hours  levothyroxine Injectable 30 MICROGram(s) IV Push at bedtime  lipid, fat emulsion (Fish Oil and Plant Based) 20% Infusion 0.55 Gm/kG/Day (21.4 mL/Hr) IV Continuous <Continuous>  metoprolol tartrate Injectable 5 milliGRAM(s) IV Push every 6 hours  octreotide  Injectable 100 MICROGram(s) IV Push three times a day  Parenteral Nutrition - Adult 1 Each TPN Continuous <Continuous>  thiamine Injectable 100 milliGRAM(s) IV Push every 24 hours  ursodiol Suspension 300 milliGRAM(s) Oral every 8 hours  venlafaxine XR. 150 milliGRAM(s) Oral two times a day    REVIEW OF SYSTEMS:  All other review of systems is negative unless indicated above.    VITALS:  T(F): 98.1 (12-04-23 @ 09:00), Max: 98.1 (12-03-23 @ 16:34)  HR: 92 (12-04-23 @ 12:00)  BP: 147/67 (12-04-23 @ 12:00)  RR: 28 (12-04-23 @ 12:00)  SpO2: 94% (12-04-23 @ 12:00)  Wt(kg): --    12-02 @ 07:01  -  12-03 @ 07:00  --------------------------------------------------------  IN: 3690 mL / OUT: 3720 mL / NET: -30 mL    12-03 @ 07:01  -  12-04 @ 07:00  --------------------------------------------------------  IN: 4470 mL / OUT: 4135 mL / NET: 335 mL    12-04 @ 07:01  -  12-04 @ 12:38  --------------------------------------------------------  IN: 990 mL / OUT: 1125 mL / NET: -135 mL        Weight (kg): 93.3 (12-04 @ 10:00)    PHYSICAL EXAM:  Constitutional: NAD, lying in bed  HEENT: NCAT, EOMI  Respiratory: CTAB, no crackles  Cardiovascular: regular rate  Gastrointestinal: abdominal wound noted with conrado pouch, PCT noted  Extremities: 1-2+ LE edema  Neurological: Awake, alert, moving all extremities    LABS:  12-04    139  |  108  |  17  ----------------------------<  112<H>  3.6   |  23  |  2.34<H>    Ca    8.7      04 Dec 2023 05:35  Phos  2.9     12-04  Mg     1.8     12-04    TPro  8.0  /  Alb  2.9<L>  /  TBili  7.4<H>  /  DBili      /  AST  86<H>  /  ALT  59<H>  /  AlkPhos  164<H>  12-04                          8.1    8.97  )-----------( 226      ( 04 Dec 2023 05:35 )             25.2       Urine Studies:  Creatinine Trend: 2.34<--, 2.59<--, 2.68<--, 2.72<--, 2.63<--, 2.89<--  Urinalysis Basic - ( 04 Dec 2023 05:35 )    Color:  / Appearance:  / SG:  / pH:   Gluc: 112 mg/dL / Ketone:   / Bili:  / Urobili:    Blood:  / Protein:  / Nitrite:    Leuk Esterase:  / RBC:  / WBC    Sq Epi:  / Non Sq Epi:  / Bacteria:       Sodium, Random Urine: 118 mmol/L (11-30 @ 05:07)  Creatinine, Random Urine: 34 mg/dL (11-30 @ 05:07)  Protein/Creatinine Ratio Calculation: 0.8 Ratio (11-30 @ 05:07)  Osmolality, Random Urine: 344 mosm/kg (11-30 @ 05:07)  Potassium, Random Urine: 8 mmol/L (11-30 @ 05:07)    RADIOLOGY & ADDITIONAL STUDIES:  Reviewed Nephrology progress note    Seen at bedside, feeling well, no acute complaints. Cr improving.    Allergies:  penicillin (Angioedema)    Hospital Medications:   MEDICATIONS  (STANDING):  chlorhexidine 2% Cloths 1 Application(s) Topical <User Schedule>  cholestyramine Powder (Sugar-Free) 4 Gram(s) Oral daily  dextrose 10% + sodium chloride 0.9%. 1000 milliLiter(s) (110 mL/Hr) IV Continuous <Continuous>  epoetin matt-epbx (RETACRIT) Injectable 26951 Unit(s) SubCutaneous every 7 days  ergocalciferol 03632 Unit(s) Oral <User Schedule>  famotidine Injectable 20 milliGRAM(s) IV Push daily  fluconAZOLE IVPB 600 milliGRAM(s) IV Intermittent every 24 hours  folic acid Injectable 1 milliGRAM(s) IV Push daily  glucagon  Injectable 1 milliGRAM(s) IntraMuscular once  heparin   Injectable 5000 Unit(s) SubCutaneous every 8 hours  hydrALAZINE Injectable 40 milliGRAM(s) IV Push every 6 hours  levothyroxine Injectable 30 MICROGram(s) IV Push at bedtime  lipid, fat emulsion (Fish Oil and Plant Based) 20% Infusion 0.55 Gm/kG/Day (21.4 mL/Hr) IV Continuous <Continuous>  metoprolol tartrate Injectable 5 milliGRAM(s) IV Push every 6 hours  octreotide  Injectable 100 MICROGram(s) IV Push three times a day  Parenteral Nutrition - Adult 1 Each TPN Continuous <Continuous>  thiamine Injectable 100 milliGRAM(s) IV Push every 24 hours  ursodiol Suspension 300 milliGRAM(s) Oral every 8 hours  venlafaxine XR. 150 milliGRAM(s) Oral two times a day    REVIEW OF SYSTEMS:  All other review of systems is negative unless indicated above.    VITALS:  T(F): 98.1 (12-04-23 @ 09:00), Max: 98.1 (12-03-23 @ 16:34)  HR: 92 (12-04-23 @ 12:00)  BP: 147/67 (12-04-23 @ 12:00)  RR: 28 (12-04-23 @ 12:00)  SpO2: 94% (12-04-23 @ 12:00)  Wt(kg): --    12-02 @ 07:01  -  12-03 @ 07:00  --------------------------------------------------------  IN: 3690 mL / OUT: 3720 mL / NET: -30 mL    12-03 @ 07:01  -  12-04 @ 07:00  --------------------------------------------------------  IN: 4470 mL / OUT: 4135 mL / NET: 335 mL    12-04 @ 07:01  -  12-04 @ 12:38  --------------------------------------------------------  IN: 990 mL / OUT: 1125 mL / NET: -135 mL        Weight (kg): 93.3 (12-04 @ 10:00)    PHYSICAL EXAM:  Constitutional: NAD, lying in bed  HEENT: NCAT, EOMI  Respiratory: CTAB, no crackles  Cardiovascular: regular rate  Gastrointestinal: abdominal wound noted with conrado pouch, PCT noted  Extremities: 1-2+ LE edema  Neurological: Awake, alert, moving all extremities    LABS:  12-04    139  |  108  |  17  ----------------------------<  112<H>  3.6   |  23  |  2.34<H>    Ca    8.7      04 Dec 2023 05:35  Phos  2.9     12-04  Mg     1.8     12-04    TPro  8.0  /  Alb  2.9<L>  /  TBili  7.4<H>  /  DBili      /  AST  86<H>  /  ALT  59<H>  /  AlkPhos  164<H>  12-04                          8.1    8.97  )-----------( 226      ( 04 Dec 2023 05:35 )             25.2       Urine Studies:  Creatinine Trend: 2.34<--, 2.59<--, 2.68<--, 2.72<--, 2.63<--, 2.89<--  Urinalysis Basic - ( 04 Dec 2023 05:35 )    Color:  / Appearance:  / SG:  / pH:   Gluc: 112 mg/dL / Ketone:   / Bili:  / Urobili:    Blood:  / Protein:  / Nitrite:    Leuk Esterase:  / RBC:  / WBC    Sq Epi:  / Non Sq Epi:  / Bacteria:       Sodium, Random Urine: 118 mmol/L (11-30 @ 05:07)  Creatinine, Random Urine: 34 mg/dL (11-30 @ 05:07)  Protein/Creatinine Ratio Calculation: 0.8 Ratio (11-30 @ 05:07)  Osmolality, Random Urine: 344 mosm/kg (11-30 @ 05:07)  Potassium, Random Urine: 8 mmol/L (11-30 @ 05:07)    RADIOLOGY & ADDITIONAL STUDIES:  Reviewed

## 2023-12-04 NOTE — PROGRESS NOTE ADULT - ASSESSMENT
A/p: 78M w PMH Crohn's, AFib/Flutter s/p DCCVs on amiodarone, remote ileocectomy and open appendectomy. Admitted (6/23) for SBO vs Crohns flare, s/p NGT decompression and s/p lap converted to open TRE, SBR x 3, left in discontinuity with abthera vac on (6/27), RTOR for ileocolic resection, small bowel anastomosis, and abdominal wall closure on (6/28), c/b fluid collection s/p IR aspiration of perihepatic fluid on (7/3), c/b wound dehiscence s/p RTOR exlap, washout, ileocolic resection with end ileostomy, blow hole colostomy, red rubber from ileostomy to small bowel anastomosis; vicryl bridging mesh on (7/5) transferred to SICU postoperatively for hemodynamic monitoring, with hospital course complicated by periods of severe ELVI and hyponatremia, which resolved but stepped back up for to SICU on (9/10) for acute AMS, intermittent hypoglycemia, AFib with RVR. Percutaneous Cholecystostomy placed on (9/11) for enlarged GB, PCT capped 10/5 and uncapped 10/25 for hyperbilirubinemia, Right brachial DVT, left basillic and cephalic superficial thrombus on duplex 11/2, CT 11/14 with ALDEN ground glass opacity of unclear etiology, completed empiric 7day cefepime course 11/22, rising T-bili of unclear etilogy, now w fungemia (candida glabrata) CLABSI, and worsening renal function.    NEURO: AMS resolved, likely septic encephalopathy, EEG neg. Hx of depression - cont Effexor. Nausea: Zofran PRN. Anxiety: Lorazepam PRN.  CV: Hx Afib/flutter: s/p DCCV, Amio stopped due to persistent transaminitis. Continue Metoprolol 5q6 with hold parameters. Hx HLD: Lipitor held given high LFTs. TTE  (7/18) - PASP 64mmHg, EF 65-70%, Hx HTN - stopped PO Norvasc as unlikely absorbing, switch to hydralazine and Lopressor IV, holding Losartan. Appears euvolemic on exam.  PULM: CT from 11/14 with ALDEN ground glass opacity of unclear etiology. COVID negative. RVP negative. completed 7d empiric cefepime 11/22 to cover for potential PNA. Now on RA.   GI/FEN: Low res, low fat, high protein diet. Cont Ensure max x1/day. 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 will replace PICC line on 12/4 and restart TPN.  High output EC fistula: cont Octreotide & Cholestyramine 10/18. Transaminitis elevated T bili of unclear origin--consult to Team 1 Sgy, s/p Perc Deb on 9/11; MRCP 10/30 no obstruction, seen by Hepatology started on ursodiol, RUQ US 11/25 CBD only 5mm, hepatic vessels patent  Requesting repeat Duplex w portal venous and arterial system, as well as image of ampulla. MRCP neg, cont PPI. Monitor drainage from fistula, bolus as needed to keep I&O even. Ensure once daily with LPS. On D10NS@110 for now while holding TPN. Folate, thiamine added.  : Voids. ELVI, Cr peaking, Urine lytes FeUrea Intrinsic ATN, unlikely volume down as spec grav low and good UOP. MARLO Duplex and RP US unremarkable, CK wnl. Bicarb drip d/c'ed 12/4. f/u renal recs pending glomerulonephritis labs  ENDO: Hx hypothyroid: IV Synthroid, TSH low on 11/30, decreased synthroid dose, recheck TSH 12/6  ID: Per ID, unlikely Caspo leading to ELVI, so will continue course. Chest CT with ALDEN with ground glass opacities, unclear etiology. bld cx from 11/22 grew candida glabrata. likely CLABSI. PICC removed 11/24.  ID consulted, Cont Fluconazole ( 12/1-). Repeat surveillance blood cx NGTD. ophthalmology evaulated - normal ocular exam. echo no vegetation, will replace picc line on 12/4.  off cefepime/flagyl. recent MRSA swab negative, RVP panel negative, COVID negative this past week. Cont Nystatin powder // PREVIOUSLY  caspofungin (11/24-12/1). completed empiric 7days cefepime (11/15-11/22), had C. tertium, Lactobacillis from his IR cx 7/3, and candida albicans, lactobacillus, vanc sensitive E faecium, vanc resistant E gallinarum, vanc resistant E casseliflavus, lactobacillus from his OR cx 7/5. Completed course of abx with Imipenem (9/10-9/15). Imipenem (8/26--) imipenem (6/30-7/12, 7/23-7/30), Dapto (6/30-7/5 and 7/23-7/24). Empiric dapto (8/23-24) and cefepime (8/23-24).   PPX: SCDs, SQH, was on Therapeutic lovenox bid for R brachial vein DVT, but will hold off on hep gtt since repeat US Duplex negative.  HEME: Anemia: continue Epogen weekly. S/p Iron Sucrose 200 qd x 3 days for chronic anemia.  LINES: PIVs // DC: LUE PICC (9/1-11/1 ), RUE PICC: (11/1-11/24)   WOUNDS/DRAINS: Abdominal wound and fistula with wound manager in place dressing change Tuesday and Friday. RLQ Ostomy. IR perc deb tube. // DC: L Clay drain.  PT: Ambulate as tolerated   DISPO: SICU due to complicated hospital course.

## 2023-12-04 NOTE — CONSULT NOTE ADULT - ASSESSMENT
78 year old with PMH Crohn's, Afib/flutter s/p DCCV on amiodarone, remote ilecectomy and open appendectomy who was admitted 6/23/23 for small bowel obstruction 2/2 to stricturing Crohn's disease. Patient was trialed with nonoperative management, but was taken to the OR for ex lap, SBR. Post operative course was complicated by fascial dehiscence requiring repeat surgeries (exlap, washout, ileocolic resection with end ileostomy, blow hole coloscotmy, red rubber ileostomy to small boewl anastomses), postoperative EC fistula, renal dysfunciton, prolonged ICU admission for fluid management, altered mental status, Afib RVR, percutaneous cholecystostomy tube placement (9/11) for enlarged gallbladder,,  and hyperbilirubinemia, as well as left basilic cephalic superficial thrombus on duplex 11/2, fungemia, or CLABSI. Hematology/oncology is con    #    Plan   - obtain free light chains, b2 microglobulin 78 year old with PMH Crohn's, Afib/flutter s/p DCCV on amiodarone, remote ilecectomy and open appendectomy who was admitted 6/23/23 for small bowel obstruction 2/2 to stricturing Crohn's disease. Patient was trialed with nonoperative management, but was taken to the OR for ex lap, SBR. Post operative course was complicated by fascial dehiscence requiring repeat surgeries (exlap, washout, ileocolic resection with end ileostomy, blow hole colostomy red rubber ileostomy to small bowel anastomses), postoperative EC fistula, renal dysfunciton, prolonged ICU admission for fluid management, altered mental status, Afib RVR, percutaneous cholecystostomy tube placement (9/11) for enlarged gallbladder,,  and hyperbilirubinemia, as well as left basilic cephalic superficial thrombus on duplex 11/2, fungemia, or CLABSI. Hematology/oncology is consulted for M spike.     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 K/L ratio 1.01 from 12/1/23 and there appears to be other causes of renal dysfunction in this patient (i.e. ATN). Patient's anemia also can be explained by other ongoing chronic processes. Patient's calcium is currently normal as well, and so does not fully fulfill CRAB criteria at this time.    Plan   - would not obtain bone marrow biopsy at this time. If kidney function not resolving and no other causes identified, patient should be considered for renal biopsy for light chain nephropathy, as this would satisfy CRAB criteria  - can follow up beta-2-microglobulin  - EPO dosing per Nephrology  - patient should have outpatient hematology/oncology follow up upon discharge. Please email [email or web address] to set up follow up appointment if patient wishes to follow up with us    Plan discussed with Dr. Mabry. Recommendations are considered final after attending attestation 78 year old with PMH Crohn's, Afib/flutter s/p DCCV on amiodarone, remote ilecectomy and open appendectomy who was admitted 6/23/23 for small bowel obstruction 2/2 to stricturing Crohn's disease. Patient was trialed with nonoperative management, but was taken to the OR for ex lap, SBR. Post operative course was complicated by fascial dehiscence requiring repeat surgeries (exlap, washout, ileocolic resection with end ileostomy, blow hole colostomy red rubber ileostomy to small bowel anastomses), postoperative EC fistula, renal dysfunciton, prolonged ICU admission for fluid management, altered mental status, Afib RVR, percutaneous cholecystostomy tube placement (9/11) for enlarged gallbladder,,  and hyperbilirubinemia, as well as left basilic cephalic superficial thrombus on duplex 11/2, fungemia, or CLABSI. Hematology/oncology is consulted for M spike.     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 K/L ratio 1.01 from 12/1/23 and there appears to be other causes of renal dysfunction in this patient (i.e. ATN). Patient's anemia also can be explained by other ongoing chronic processes. Patient's calcium is currently normal as well, and so does not fully fulfill CRAB criteria at this time.    Plan   - would not obtain bone marrow biopsy at this time. If kidney function not resolving and no other causes identified, patient should be considered for renal biopsy for light chain nephropathy, as this would satisfy CRAB criteria  - can follow up beta-2-microglobulin  - EPO dosing per Nephrology  - patient should have outpatient hematology/oncology follow up upon discharge. Please email [email or web address] to set up follow up appointment if patient wishes to follow up with us    Plan discussed with Dr. Mabry. Recommendations are considered final after attending attestation

## 2023-12-04 NOTE — PROGRESS NOTE ADULT - ATTENDING COMMENTS
ELVI improving   f/u free light chains   consider lower IVF with edema and HTN -- allow gently negative ELVI improving   f/u free light chains   consider lower IVF (and change to 1/2 NS instead of NS)  with edema and HTN -- allow gently negative

## 2023-12-04 NOTE — PROGRESS NOTE ADULT - SUBJECTIVE AND OBJECTIVE BOX
SUBJECTIVE: Patient seen and examined at bedside this morning. Overnight, staggered lopressor and hydralazine to alternate every 3 hours.  @ 5 am, net negative 500, gave 1L LR bolus. Feels well, will receive single lumen PICC and start TPN today.       fluconAZOLE IVPB 600 milliGRAM(s) IV Intermittent every 24 hours  heparin   Injectable 5000 Unit(s) SubCutaneous every 8 hours  hydrALAZINE Injectable 40 milliGRAM(s) IV Push every 6 hours  metoprolol tartrate Injectable 5 milliGRAM(s) IV Push every 6 hours    MEDICATIONS  (PRN):  LORazepam   Injectable 0.5 milliGRAM(s) IV Push at bedtime PRN Anxiety  ondansetron Injectable 4 milliGRAM(s) IV Push every 6 hours PRN Nausea and/or Vomiting      I&O's Detail    03 Dec 2023 07:01  -  04 Dec 2023 07:00  --------------------------------------------------------  IN:    dextrose 10% + sodium chloride 0.45% w/ Additives: 2640 mL    IV PiggyBack: 50 mL    IV PiggyBack: 300 mL    IV PiggyBack: 1000 mL    Oral Fluid: 480 mL  Total IN: 4470 mL    OUT:    Drain (mL): 725 mL    Drain (mL): 45 mL    Drain (mL): 2065 mL    Voided (mL): 1300 mL  Total OUT: 4135 mL    Total NET: 335 mL      04 Dec 2023 07:01  -  04 Dec 2023 10:35  --------------------------------------------------------  IN:    dextrose 10% + sodium chloride 0.45% w/ Additives: 220 mL    Oral Fluid: 240 mL  Total IN: 460 mL    OUT:    Drain (mL): 350 mL    Voided (mL): 125 mL  Total OUT: 475 mL    Total NET: -15 mL          T(C): 36.7 (12-04-23 @ 09:00), Max: 36.9 (12-03-23 @ 12:30)  HR: 95 (12-04-23 @ 10:00) (88 - 95)  BP: 166/74 (12-04-23 @ 10:00) (150/57 - 175/80)  RR: 19 (12-04-23 @ 10:00) (17 - 27)  SpO2: 95% (12-04-23 @ 10:00) (93% - 98%)    Physical Exam:     General: NAD, mildly jaundiced  Neuro: A/O x3, CNs II-XII grossly intact, normal motor/sensation, no focal deficits  HEENT: NC/AT, EOMI, PERRLA, normal hearing, no oral lesions, neck supple w/o LAD  Pulmonary: Nonlabored breathing, no respiratory distress, CTA-B  Cardiovascular: NSR, no murmurs  Abdominal: soft, NT/ND, Dustin in place draining brown liquid. ileostomy with red rubber in place  Extremities: WWP, 5/5 strength x 4, no clubbing/cyanosis/edema  Pulses: palpable distal pulses  Skin: no rashes noted  MSK: No joint swelling  Psych: No signs of anxiety or depression    LABS:                        8.1    8.97  )-----------( 226      ( 04 Dec 2023 05:35 )             25.2     12-04    139  |  108  |  17  ----------------------------<  112<H>  3.6   |  23  |  2.34<H>    Ca    8.7      04 Dec 2023 05:35  Phos  2.9     12-04  Mg     1.8     12-04    TPro  8.0  /  Alb  2.9<L>  /  TBili  7.4<H>  /  DBili  x   /  AST  86<H>  /  ALT  59<H>  /  AlkPhos  164<H>  12-04    PT/INR - ( 04 Dec 2023 05:35 )   PT: 15.7 sec;   INR: 1.39          PTT - ( 04 Dec 2023 05:35 )  PTT:34.1 sec  Urinalysis Basic - ( 04 Dec 2023 05:35 )    Color: x / Appearance: x / SG: x / pH: x  Gluc: 112 mg/dL / Ketone: x  / Bili: x / Urobili: x   Blood: x / Protein: x / Nitrite: x   Leuk Esterase: x / RBC: x / WBC x   Sq Epi: x / Non Sq Epi: x / Bacteria: x        RADIOLOGY & ADDITIONAL STUDIES:      Culture - Body Fluid with Gram Stain (collected 12-01-23 @ 20:43)  Source: Bile Bile  Gram Stain (12-01-23 @ 22:05):    Rare Gram Negative Rods    No WBC's seen.  Final Report (12-03-23 @ 12:53):    Few Pseudomonas aeruginosa (Carbapenem Resistant) Floor previously    notified.    Few Pseudomonas aeruginosa #2    Few Escherichia coli    Rare Bacteroides fragilis Beta lactamase positive  Organism: Pseudomonas aeruginosa (Carbapenem Resistant)  Pseudomonas aeruginosa (Carbapenem Resistant)  Escherichia coli  Pseudomonas aeruginosa  Pseudomonas aeruginosa (12-03-23 @ 12:53)  Organism: Pseudomonas aeruginosa (12-03-23 @ 12:53)      -  Tobramycin: S      Method Type: KB  Organism: Pseudomonas aeruginosa (12-03-23 @ 12:53)      -  Levofloxacin: R 4      -  Aztreonam: S <=4      -  Cefepime: S 8      -  Piperacillin/Tazobactam: S <=8      -  Ciprofloxacin: R 2      Method Type: DOMENICO      -  Meropenem: S 2  Organism: Pseudomonas aeruginosa (Carbapenem Resistant) (12-03-23 @ 12:53)      -  Tobramycin: S      Method Type: KB      -  Meropenem: R  Organism: Escherichia coli (12-03-23 @ 12:53)      -  Tobramycin: S <=2      -  Gentamicin: S <=2      -  Cefazolin: R >16      -  Piperacillin/Tazobactam: S <=8      -  Ciprofloxacin: S <=0.25      -  Ceftriaxone: S <=1      -  Ampicillin: R >16 These ampicillin results predict results for amoxicillin      Method Type: DOMENICO      -  Ampicillin/Sulbactam: R >16/8      -  Trimethoprim/Sulfamethoxazole: S <=0.5/9.5      -  Ertapenem: S <=0.5  Organism: Pseudomonas aeruginosa (Carbapenem Resistant) (12-03-23 @ 12:53)      -  Levofloxacin: S <=0.5      -  Aztreonam: S <=4      -  Cefepime: S <=2      -  Piperacillin/Tazobactam: S <=8      -  Ciprofloxacin: S 0.5      Method Type: DOMENICO

## 2023-12-04 NOTE — PROGRESS NOTE ADULT - SUBJECTIVE AND OBJECTIVE BOX
SUBJECTIVE:  AMRITA, VSS o/n. Pt reports that he feels well.     MEDICATIONS  (STANDING):  chlorhexidine 2% Cloths 1 Application(s) Topical <User Schedule>  cholestyramine Powder (Sugar-Free) 4 Gram(s) Oral daily  dextrose 10% + sodium chloride 0.45% 1000 milliLiter(s) (110 mL/Hr) IV Continuous <Continuous>  epoetin matt-epbx (RETACRIT) Injectable 38970 Unit(s) SubCutaneous every 7 days  ergocalciferol 49055 Unit(s) Oral <User Schedule>  famotidine Injectable 20 milliGRAM(s) IV Push daily  fluconAZOLE IVPB 600 milliGRAM(s) IV Intermittent every 24 hours  folic acid Injectable 1 milliGRAM(s) IV Push daily  glucagon  Injectable 1 milliGRAM(s) IntraMuscular once  heparin   Injectable 5000 Unit(s) SubCutaneous every 8 hours  hydrALAZINE Injectable 20 milliGRAM(s) IV Push every 6 hours  levothyroxine Injectable 30 MICROGram(s) IV Push at bedtime  metoprolol tartrate Injectable 5 milliGRAM(s) IV Push every 6 hours  octreotide  Injectable 100 MICROGram(s) IV Push three times a day  thiamine Injectable 100 milliGRAM(s) IV Push every 24 hours  ursodiol Suspension 300 milliGRAM(s) Oral every 8 hours  venlafaxine XR. 150 milliGRAM(s) Oral two times a day    MEDICATIONS  (PRN):  LORazepam   Injectable 0.5 milliGRAM(s) IV Push at bedtime PRN Anxiety  ondansetron Injectable 4 milliGRAM(s) IV Push every 6 hours PRN Nausea and/or Vomiting      Vital Signs Last 24 Hrs  T(C): 36.7 (04 Dec 2023 05:17), Max: 36.9 (03 Dec 2023 12:30)  T(F): 98.1 (04 Dec 2023 05:17), Max: 98.5 (03 Dec 2023 12:30)  HR: 95 (04 Dec 2023 07:30) (88 - 95)  BP: 164/78 (04 Dec 2023 07:30) (150/57 - 174/79)  BP(mean): 112 (04 Dec 2023 07:30) (95 - 127)  RR: 27 (04 Dec 2023 07:30) (17 - 27)  SpO2: 95% (04 Dec 2023 07:30) (93% - 98%)    Parameters below as of 04 Dec 2023 08:00  Patient On (Oxygen Delivery Method): room air        Physical Exam:  General: NAD, resting comfortably in bed  Pulmonary: Nonlabored breathing, no respiratory distress  Cardiovascular: NSR  Abdominal: soft, NT/ND, Dustin in place, ileostomy and red rubber in place  Extremities: WWP, normal strength  Neuro: A/O x 3, CNs II-XII grossly intact, no focal deficits    I&O's Summary    03 Dec 2023 07:01  -  04 Dec 2023 07:00  --------------------------------------------------------  IN: 4470 mL / OUT: 4135 mL / NET: 335 mL        LABS:                        8.1    8.97  )-----------( 226      ( 04 Dec 2023 05:35 )             25.2     12-04    139  |  108  |  17  ----------------------------<  112<H>  3.6   |  23  |  2.34<H>    Ca    8.7      04 Dec 2023 05:35  Phos  2.9     12-04  Mg     1.8     12-04    TPro  8.0  /  Alb  2.9<L>  /  TBili  7.4<H>  /  DBili  x   /  AST  86<H>  /  ALT  59<H>  /  AlkPhos  164<H>  12-04    PT/INR - ( 04 Dec 2023 05:35 )   PT: 15.7 sec;   INR: 1.39          PTT - ( 04 Dec 2023 05:35 )  PTT:34.1 sec  Urinalysis Basic - ( 04 Dec 2023 05:35 )    Color: x / Appearance: x / SG: x / pH: x  Gluc: 112 mg/dL / Ketone: x  / Bili: x / Urobili: x   Blood: x / Protein: x / Nitrite: x   Leuk Esterase: x / RBC: x / WBC x   Sq Epi: x / Non Sq Epi: x / Bacteria: x      CAPILLARY BLOOD GLUCOSE        LIVER FUNCTIONS - ( 04 Dec 2023 05:35 )  Alb: 2.9 g/dL / Pro: 8.0 g/dL / ALK PHOS: 164 U/L / ALT: 59 U/L / AST: 86 U/L / GGT: x             RADIOLOGY & ADDITIONAL STUDIES:   SUBJECTIVE:  AMRITA, VSS o/n. Pt reports that he feels well.     MEDICATIONS  (STANDING):  chlorhexidine 2% Cloths 1 Application(s) Topical <User Schedule>  cholestyramine Powder (Sugar-Free) 4 Gram(s) Oral daily  dextrose 10% + sodium chloride 0.45% 1000 milliLiter(s) (110 mL/Hr) IV Continuous <Continuous>  epoetin matt-epbx (RETACRIT) Injectable 37833 Unit(s) SubCutaneous every 7 days  ergocalciferol 77403 Unit(s) Oral <User Schedule>  famotidine Injectable 20 milliGRAM(s) IV Push daily  fluconAZOLE IVPB 600 milliGRAM(s) IV Intermittent every 24 hours  folic acid Injectable 1 milliGRAM(s) IV Push daily  glucagon  Injectable 1 milliGRAM(s) IntraMuscular once  heparin   Injectable 5000 Unit(s) SubCutaneous every 8 hours  hydrALAZINE Injectable 20 milliGRAM(s) IV Push every 6 hours  levothyroxine Injectable 30 MICROGram(s) IV Push at bedtime  metoprolol tartrate Injectable 5 milliGRAM(s) IV Push every 6 hours  octreotide  Injectable 100 MICROGram(s) IV Push three times a day  thiamine Injectable 100 milliGRAM(s) IV Push every 24 hours  ursodiol Suspension 300 milliGRAM(s) Oral every 8 hours  venlafaxine XR. 150 milliGRAM(s) Oral two times a day    MEDICATIONS  (PRN):  LORazepam   Injectable 0.5 milliGRAM(s) IV Push at bedtime PRN Anxiety  ondansetron Injectable 4 milliGRAM(s) IV Push every 6 hours PRN Nausea and/or Vomiting      Vital Signs Last 24 Hrs  T(C): 36.7 (04 Dec 2023 05:17), Max: 36.9 (03 Dec 2023 12:30)  T(F): 98.1 (04 Dec 2023 05:17), Max: 98.5 (03 Dec 2023 12:30)  HR: 95 (04 Dec 2023 07:30) (88 - 95)  BP: 164/78 (04 Dec 2023 07:30) (150/57 - 174/79)  BP(mean): 112 (04 Dec 2023 07:30) (95 - 127)  RR: 27 (04 Dec 2023 07:30) (17 - 27)  SpO2: 95% (04 Dec 2023 07:30) (93% - 98%)    Parameters below as of 04 Dec 2023 08:00  Patient On (Oxygen Delivery Method): room air        Physical Exam:  General: NAD, resting comfortably in bed  Pulmonary: Nonlabored breathing, no respiratory distress  Cardiovascular: NSR  Abdominal: soft, NT/ND, Dustin in place, ileostomy and red rubber in place  Extremities: WWP, normal strength  Neuro: A/O x 3, CNs II-XII grossly intact, no focal deficits    I&O's Summary    03 Dec 2023 07:01  -  04 Dec 2023 07:00  --------------------------------------------------------  IN: 4470 mL / OUT: 4135 mL / NET: 335 mL        LABS:                        8.1    8.97  )-----------( 226      ( 04 Dec 2023 05:35 )             25.2     12-04    139  |  108  |  17  ----------------------------<  112<H>  3.6   |  23  |  2.34<H>    Ca    8.7      04 Dec 2023 05:35  Phos  2.9     12-04  Mg     1.8     12-04    TPro  8.0  /  Alb  2.9<L>  /  TBili  7.4<H>  /  DBili  x   /  AST  86<H>  /  ALT  59<H>  /  AlkPhos  164<H>  12-04    PT/INR - ( 04 Dec 2023 05:35 )   PT: 15.7 sec;   INR: 1.39          PTT - ( 04 Dec 2023 05:35 )  PTT:34.1 sec  Urinalysis Basic - ( 04 Dec 2023 05:35 )    Color: x / Appearance: x / SG: x / pH: x  Gluc: 112 mg/dL / Ketone: x  / Bili: x / Urobili: x   Blood: x / Protein: x / Nitrite: x   Leuk Esterase: x / RBC: x / WBC x   Sq Epi: x / Non Sq Epi: x / Bacteria: x      CAPILLARY BLOOD GLUCOSE        LIVER FUNCTIONS - ( 04 Dec 2023 05:35 )  Alb: 2.9 g/dL / Pro: 8.0 g/dL / ALK PHOS: 164 U/L / ALT: 59 U/L / AST: 86 U/L / GGT: x             RADIOLOGY & ADDITIONAL STUDIES:

## 2023-12-04 NOTE — PROGRESS NOTE ADULT - ATTENDING COMMENTS
creatinine starting to improve  plan for single lumen picc and re-initiation of TPN, OKed by ID  daily weights  completing course of fluconazole, daily EKGs to monitor QTc    Decision making high complexity

## 2023-12-04 NOTE — PROGRESS NOTE ADULT - ASSESSMENT
78M w PMH Crohn's, AFib/Flutter s/p DCCVs on amiodarone, remote ileocectomy and open appendectomy. Admitted (6/23) for SBO vs Crohns flare, s/p NGT decompression and s/p lap converted to open TRE, SBR x 3, left in discontinuity with abthera vac on (6/27), RTOR for ileocolic resection, small bowel anastomosis, and abdominal wall closure on (6/28), c/b fluid collection s/p IR aspiration of perihepatic fluid on (7/3), c/b wound dehiscence s/p RTOR exlap, washout, ileocolic resection with end ileostomy, blow hole colostomy, red rubber from ileostomy to small bowel anastomosis; vicryl bridging mesh on (7/5) transferred to SICU postoperatively for hemodynamic monitoring, with hospital course complicated by periods of severe ELVI and hyponatremia, which resolved but stepped back up for to SICU on (9/10) for acute AMS, intermittent hypoglycemia, AFib with RVR. Percutaneous Cholecystostomy placed on (9/11) for enlarged GB, PCT capped 10/5 and uncapped 10/25 for hyperbilirubinemia, Right brachial DVT, left basillic and cephalic superficial thrombus on duplex 11/2, CT 11/14 with ALDEN ground glass opacity of unclear etiology, completed empiric 7day cefepime course 11/22, rising T-bili of unclear etilogy, now w fungemia (candida glabrata) CLABSI, and worsening renal function.    PICC/TPN today   Trend Cr, resolving   Care per SICU

## 2023-12-04 NOTE — CONSULT NOTE ADULT - SUBJECTIVE AND OBJECTIVE BOX
Hematology Oncology Consult Note     78 year old with PMH Crohn's, Afib/flutter s/p DCCV on amiodarone, remote ilecectomy and open appendectomy who was admitted 6/23/23 for small bowel obstruction 2/2 to stricturing Crohn's disease. Patient was trialed with nonoperative management, but was taken to the OR for ex lap, SBR. Post operative course was complicated by fascial dehiscence requiring repeat surgeries (exlap, washout, ileocolic resection with end ileostomy, blow hole coloscotmy, red rubber ileostomy to small boewl anastomses), postoperative EC fistula, renal dysfunciton, prolonged ICU admission for fluid management, altered mental status, Afib RVR, percutaneous cholecystostomy tube placement (9/11) for enlarged gallbladder,,  and hyperbilirubinemia, as well as left basilic cephalic superficial thrombus on duplex 11/2, fungemia, or CLABSI.     Patient examined at bedside, in minimal distress. Reports he has had the M spike for years (on chart review 1.1 4/1/2022, 3/5/2021, 12/2/2020 0.9). Denies any bone pains or family history of cancer. Denies smoking, alcohol, other drug use. 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     Labs   11/19/2023 - Creatinine was normal at 0.96, peaked at 2.9, now downtrending  M spike of 1.7, IgG Lambda band identified  WBC 8.97, Hgb 8.1, Platelet 226  Lactate Dehydrogenase 150  Bence/Chaidez proteins present in urine  Calcium 8.7  Hgb 8.1  K/1 3/5/2021 0.81  autoimmune workup for GN pending per nephrology  K/L 12/1/23 ratio 1.02    Imaging    11/25/23 upper extremity ultarsound  IMPRESSION:  No evidence of deep venous thrombosis in either upper extremity.    Bilateral basilic and cephalic veins superficial thrombophlebitis.    CT C/A/P without bone lesions    PAST MEDICAL & SURGICAL HISTORY:  Essential hypertension  Hypertension      Atrial fibrillation  s/p cardioversion 2010 and 2014  Pt. reports 4 DCCV  Now on Amiodarone 200mg PO bid and Eliquis 5mg PO bid  Last DCCV 4 yrs ago at [facility]      Crohn's disease  s/p partial resection of ileum      Hyperlipidemia      Hypothyroidism      History of depression  On Venlafaxine ER 150mg PO bid      Junctional rhythm      H/O knee surgery      History of cataract surgery          Allergies:  penicillin (Angioedema)      Medications:  heparin   Injectable 5000 Unit(s) SubCutaneous every 8 hours        Social History:    FAMILY HISTORY:      PHYSICAL EXAM:    T(F): 98.1 (12-04-23 @ 09:00), Max: 98.1 (12-04-23 @ 05:17)  HR: 93 (12-04-23 @ 17:00) (91 - 96)  BP: 160/70 (12-04-23 @ 17:00) (146/66 - 193/67)  RR: 20 (12-04-23 @ 17:00) (17 - 28)  SpO2: 96% (12-04-23 @ 17:00) (93% - 97%)  Wt(kg): --    Daily     Daily     Gen: well developed, well nourished, comfortable  Neck: supple  Cardiovascular: RR, nl S1S2, no murmurs/rubs/gallops  Respiratory: clear air entry b/l  Gastrointestinal: BS+, soft, NT/ND, no masses, no splenomegaly, no hepatomegaly, no evidence for ascites  Extremities: no clubbing/cyanosis, no edema, no calf tenderness  Neurological: no focal deficits  Skin: no rash on visible skin  Lymph Nodes:  no cervical/supraclavicular LAD, no axillary/groin LAD  Musculoskeletal:  full ROM  Psychiatric:  mood stable      Labs:                          8.1    8.97  )-----------( 226      ( 04 Dec 2023 05:35 )             25.2     CBC Full  -  ( 04 Dec 2023 05:35 )  WBC Count : 8.97 K/uL  RBC Count : 2.62 M/uL  Hemoglobin : 8.1 g/dL  Hematocrit : 25.2 %  Platelet Count - Automated : 226 K/uL  Mean Cell Volume : 96.6 fl  Mean Cell Hemoglobin : 31.0 pg  Mean Cell Hemoglobin Concentration : 32.1 gm/dL  Auto Neutrophil # : x  Auto Lymphocyte # : x  Auto Monocyte # : x  Auto Eosinophil # : x  Auto Basophil # : x  Auto Neutrophil % : x  Auto Lymphocyte % : x  Auto Monocyte % : x  Auto Eosinophil % : x  Auto Basophil % : x    PT/INR - ( 04 Dec 2023 05:35 )   PT: 15.7 sec;   INR: 1.39          PTT - ( 04 Dec 2023 05:35 )  PTT:34.1 sec    12-04    139  |  108  |  17  ----------------------------<  112<H>  3.6   |  23  |  2.34<H>    Ca    8.7      04 Dec 2023 05:35  Phos  2.9     12-04  Mg     1.8     12-04    TPro  8.0  /  Alb  2.9<L>  /  TBili  7.4<H>  /  DBili  x   /  AST  86<H>  /  ALT  59<H>  /  AlkPhos  164<H>  12-04      Urinalysis Basic - ( 04 Dec 2023 05:35 )    Color: x / Appearance: x / SG: x / pH: x  Gluc: 112 mg/dL / Ketone: x  / Bili: x / Urobili: x   Blood: x / Protein: x / Nitrite: x   Leuk Esterase: x / RBC: x / WBC x   Sq Epi: x / Non Sq Epi: x / Bacteria: x     Hematology Oncology Consult Note     78 year old with PMH Crohn's, Afib/flutter s/p DCCV on amiodarone, remote ilecectomy and open appendectomy who was admitted 6/23/23 for small bowel obstruction 2/2 to stricturing Crohn's disease. Patient was trialed with nonoperative management, but was taken to the OR for ex lap, SBR. Post operative course was complicated by fascial dehiscence requiring repeat surgeries (exlap, washout, ileocolic resection with end ileostomy, blow hole coloscotmy, red rubber ileostomy to small boewl anastomses), postoperative EC fistula, renal dysfunciton, prolonged ICU admission for fluid management, altered mental status, Afib RVR, percutaneous cholecystostomy tube placement (9/11) for enlarged gallbladder,,  and hyperbilirubinemia, as well as left basilic cephalic superficial thrombus on duplex 11/2, fungemia, or CLABSI.     Patient examined at bedside, in minimal distress. Reports he has had the M spike for years (on chart review 1.1 4/1/2022, 3/5/2021, 12/2/2020 0.9). Denies any bone pains or family history of cancer. Denies smoking, alcohol, other drug use. 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     Labs   11/19/2023 - Creatinine was normal at 0.96, peaked at 2.9, now downtrending  M spike of 1.7, IgG Lambda band identified  WBC 8.97, Hgb 8.1, Platelet 226  Lactate Dehydrogenase 150  Bence/Chaidez proteins present in urine  Calcium 8.7  Hgb 8.1  K/1 3/5/2021 0.81  autoimmune workup for GN pending per nephrology  K/L 12/1/23 ratio 1.02    Imaging    11/25/23 upper extremity ultarsound  IMPRESSION:  No evidence of deep venous thrombosis in either upper extremity.    Bilateral basilic and cephalic veins superficial thrombophlebitis.    CT C/A/P without bone lesions    PAST MEDICAL & SURGICAL HISTORY:  Essential hypertension  Hypertension      Atrial fibrillation  s/p cardioversion 2010 and 2014  Pt. reports 4 DCCV  Now on Amiodarone 200mg PO bid and Eliquis 5mg PO bid  Last DCCV 4 yrs ago at [facility]      Crohn's disease  s/p partial resection of ileum      Hyperlipidemia      Hypothyroidism      History of depression  On Venlafaxine ER 150mg PO bid      Junctional rhythm      H/O knee surgery      History of cataract surgery          Allergies:  penicillin (Angioedema)      Medications:  heparin   Injectable 5000 Unit(s) SubCutaneous every 8 hours        Social History:    FAMILY HISTORY:      PHYSICAL EXAM:    T(F): 98.1 (12-04-23 @ 09:00), Max: 98.1 (12-04-23 @ 05:17)  HR: 93 (12-04-23 @ 17:00) (91 - 96)  BP: 160/70 (12-04-23 @ 17:00) (146/66 - 193/67)  RR: 20 (12-04-23 @ 17:00) (17 - 28)  SpO2: 96% (12-04-23 @ 17:00) (93% - 97%)  Wt(kg): --    Daily     Daily     Gen: well developed, well nourished, comfortable  Neck: supple  Cardiovascular: RR, nl S1S2, no murmurs/rubs/gallops  Respiratory: clear air entry b/l  Gastrointestinal: BS+, soft, NT/ND, no masses, no splenomegaly, no hepatomegaly, no evidence for ascites  Extremities: no clubbing/cyanosis, no edema, no calf tenderness  Neurological: no focal deficits  Skin: no rash on visible skin  Lymph Nodes:  no cervical/supraclavicular LAD, no axillary/groin LAD  Musculoskeletal:  full ROM  Psychiatric:  mood stable      Labs:                          8.1    8.97  )-----------( 226      ( 04 Dec 2023 05:35 )             25.2     CBC Full  -  ( 04 Dec 2023 05:35 )  WBC Count : 8.97 K/uL  RBC Count : 2.62 M/uL  Hemoglobin : 8.1 g/dL  Hematocrit : 25.2 %  Platelet Count - Automated : 226 K/uL  Mean Cell Volume : 96.6 fl  Mean Cell Hemoglobin : 31.0 pg  Mean Cell Hemoglobin Concentration : 32.1 gm/dL  Auto Neutrophil # : x  Auto Lymphocyte # : x  Auto Monocyte # : x  Auto Eosinophil # : x  Auto Basophil # : x  Auto Neutrophil % : x  Auto Lymphocyte % : x  Auto Monocyte % : x  Auto Eosinophil % : x  Auto Basophil % : x    PT/INR - ( 04 Dec 2023 05:35 )   PT: 15.7 sec;   INR: 1.39          PTT - ( 04 Dec 2023 05:35 )  PTT:34.1 sec    12-04    139  |  108  |  17  ----------------------------<  112<H>  3.6   |  23  |  2.34<H>    Ca    8.7      04 Dec 2023 05:35  Phos  2.9     12-04  Mg     1.8     12-04    TPro  8.0  /  Alb  2.9<L>  /  TBili  7.4<H>  /  DBili  x   /  AST  86<H>  /  ALT  59<H>  /  AlkPhos  164<H>  12-04      Urinalysis Basic - ( 04 Dec 2023 05:35 )    Color: x / Appearance: x / SG: x / pH: x  Gluc: 112 mg/dL / Ketone: x  / Bili: x / Urobili: x   Blood: x / Protein: x / Nitrite: x   Leuk Esterase: x / RBC: x / WBC x   Sq Epi: x / Non Sq Epi: x / Bacteria: x     Hematology Oncology Consult Note     78 year old with PMH Crohn's, Afib/flutter s/p DCCV on amiodarone, remote ilecectomy and open appendectomy who was admitted 6/23/23 for small bowel obstruction 2/2 to stricturing Crohn's disease. Patient was trialed with nonoperative management, but was taken to the OR for ex lap, SBR. Post operative course was complicated by fascial dehiscence requiring repeat surgeries (ex lap, washout, ileocolic resection with end ileostomy, blow hole colostomy, red rubber ileostomy to small bowel anastomoses postoperative EC fistula, renal dysfunction prolonged ICU admission for fluid management, altered mental status, Afib RVR, percutaneous cholecystostomy tube placement (9/11) for enlarged gallbladder, and hyperbilirubinemia, as well as left basilic cephalic superficial thrombus on duplex 11/2, fungemia, vs CLABSI.     Patient examined at bedside, in minimal distress. Reports he has had the M spike for years (on chart review 1.1 4/1/2022, 3/5/2021, 12/2/2020 0.9). Denies any bone pains or family history of cancer. Denies smoking, alcohol, other drug use. 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     Labs   11/19/2023 - Creatinine was normal at 0.96, peaked at 2.9, now downtrending  M spike of 1.7, IgG Lambda band identified  WBC 8.97, Hgb 8.1, Platelet 226  Lactate Dehydrogenase 150  Bence/Chaidez proteins present in urine  Calcium 8.7  Hgb 8.1  K/1 3/5/2021 0.81  autoimmune workup for GN pending per nephrology  K/L 12/1/23 ratio 1.02    Imaging    11/25/23 upper extremity ultarsound  IMPRESSION:  No evidence of deep venous thrombosis in either upper extremity.    Bilateral basilic and cephalic veins superficial thrombophlebitis.    CT C/A/P without bone lesions    PAST MEDICAL & SURGICAL HISTORY:  Essential hypertension  Hypertension      Atrial fibrillation  s/p cardioversion 2010 and 2014  Pt. reports 4 DCCV  Now on Amiodarone 200mg PO bid and Eliquis 5mg PO bid  Last DCCV 4 yrs ago at [facility]      Crohn's disease  s/p partial resection of ileum      Hyperlipidemia      Hypothyroidism      History of depression  On Venlafaxine ER 150mg PO bid      Junctional rhythm      H/O knee surgery      History of cataract surgery          Allergies:  penicillin (Angioedema)      Medications:  heparin   Injectable 5000 Unit(s) SubCutaneous every 8 hours        Social History:    FAMILY HISTORY:      PHYSICAL EXAM:    T(F): 98.1 (12-04-23 @ 09:00), Max: 98.1 (12-04-23 @ 05:17)  HR: 93 (12-04-23 @ 17:00) (91 - 96)  BP: 160/70 (12-04-23 @ 17:00) (146/66 - 193/67)  RR: 20 (12-04-23 @ 17:00) (17 - 28)  SpO2: 96% (12-04-23 @ 17:00) (93% - 97%)  Wt(kg): --    Daily     Daily     Gen: well developed, well nourished, comfortable  Neck: supple  Cardiovascular: RR, nl S1S2, no murmurs/rubs/gallops  Respiratory: clear air entry b/l  Gastrointestinal: BS+, soft, NT/ND, no masses, no splenomegaly, no hepatomegaly, no evidence for ascites  Extremities: no edema  Neurological: no focal deficits  Skin: no rash on visible skin  Musculoskeletal:  full ROM  Psychiatric:  mood stable      Labs:                          8.1    8.97  )-----------( 226      ( 04 Dec 2023 05:35 )             25.2     CBC Full  -  ( 04 Dec 2023 05:35 )  WBC Count : 8.97 K/uL  RBC Count : 2.62 M/uL  Hemoglobin : 8.1 g/dL  Hematocrit : 25.2 %  Platelet Count - Automated : 226 K/uL  Mean Cell Volume : 96.6 fl  Mean Cell Hemoglobin : 31.0 pg  Mean Cell Hemoglobin Concentration : 32.1 gm/dL  Auto Neutrophil # : x  Auto Lymphocyte # : x  Auto Monocyte # : x  Auto Eosinophil # : x  Auto Basophil # : x  Auto Neutrophil % : x  Auto Lymphocyte % : x  Auto Monocyte % : x  Auto Eosinophil % : x  Auto Basophil % : x    PT/INR - ( 04 Dec 2023 05:35 )   PT: 15.7 sec;   INR: 1.39          PTT - ( 04 Dec 2023 05:35 )  PTT:34.1 sec    12-04    139  |  108  |  17  ----------------------------<  112<H>  3.6   |  23  |  2.34<H>    Ca    8.7      04 Dec 2023 05:35  Phos  2.9     12-04  Mg     1.8     12-04    TPro  8.0  /  Alb  2.9<L>  /  TBili  7.4<H>  /  DBili  x   /  AST  86<H>  /  ALT  59<H>  /  AlkPhos  164<H>  12-04      Urinalysis Basic - ( 04 Dec 2023 05:35 )    Color: x / Appearance: x / SG: x / pH: x  Gluc: 112 mg/dL / Ketone: x  / Bili: x / Urobili: x   Blood: x / Protein: x / Nitrite: x   Leuk Esterase: x / RBC: x / WBC x   Sq Epi: x / Non Sq Epi: x / Bacteria: x     Hematology Oncology Consult Note     78 year old with PMH Crohn's, Afib/flutter s/p DCCV on amiodarone, remote ilecectomy and open appendectomy who was admitted 6/23/23 for small bowel obstruction 2/2 to stricturing Crohn's disease. Patient was trialed with nonoperative management, but was taken to the OR for ex lap, SBR. Post operative course was complicated by fascial dehiscence requiring repeat surgeries (ex lap, washout, ileocolic resection with end ileostomy, blow hole colostomy, red rubber ileostomy to small bowel anastomoses postoperative EC fistula, renal dysfunction prolonged ICU admission for fluid management, altered mental status, Afib RVR, percutaneous cholecystostomy tube placement (9/11) for enlarged gallbladder, and hyperbilirubinemia, as well as left basilic cephalic superficial thrombus on duplex 11/2, fungemia, vs CLABSI.     Patient examined at bedside, in minimal distress. Reports he has had the M spike for years (on chart review 1.1 4/1/2022, 3/5/2021, 12/2/2020 0.9). Denies any bone pains or family history of cancer. Denies smoking, alcohol, other drug use. 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     Labs   11/19/2023 - Creatinine was normal at 0.96, peaked at 2.9, now downtrending  M spike of 1.7, IgG Lambda band identified  WBC 8.97, Hgb 8.1, Platelet 226  Lactate Dehydrogenase 150  Bence/Chaidez proteins present in urine  Calcium 8.7  Hgb 8.1  K/1 3/5/2021 0.81  autoimmune workup for GN pending per nephrology  K/L 12/1/23 ratio 1.02    Imaging    11/25/23 upper extremity ultarsound  IMPRESSION:  No evidence of deep venous thrombosis in either upper extremity.    Bilateral basilic and cephalic veins superficial thrombophlebitis.    CT C/A/P without bone lesions    PAST MEDICAL & SURGICAL HISTORY:  Essential hypertension  Hypertension      Atrial fibrillation  s/p cardioversion 2010 and 2014  Pt. reports 4 DCCV  Now on Amiodarone 200mg PO bid and Eliquis 5mg PO bid  Last DCCV 4 yrs ago at [facility]      Crohn's disease  s/p partial resection of ileum      Hyperlipidemia      Hypothyroidism      History of depression  On Venlafaxine ER 150mg PO bid      Junctional rhythm      H/O knee surgery      History of cataract surgery          Allergies:  penicillin (Angioedema)      Medications:  heparin   Injectable 5000 Unit(s) SubCutaneous every 8 hours        Social History:    FAMILY HISTORY:      PHYSICAL EXAM:    T(F): 98.1 (12-04-23 @ 09:00), Max: 98.1 (12-04-23 @ 05:17)  HR: 93 (12-04-23 @ 17:00) (91 - 96)  BP: 160/70 (12-04-23 @ 17:00) (146/66 - 193/67)  RR: 20 (12-04-23 @ 17:00) (17 - 28)  SpO2: 96% (12-04-23 @ 17:00) (93% - 97%)  Wt(kg): --    Daily     Daily     Gen: well developed, well nourished, comfortable  Neck: supple  Cardiovascular: RR, nl S1S2, no murmurs/rubs/gallops  Respiratory: clear air entry b/l  Gastrointestinal: BS+, soft, NT/ND, no masses, no splenomegaly, no hepatomegaly, no evidence for ascites  Extremities: no edema  Neurological: no focal deficits  Skin: no rash on visible skin  Musculoskeletal:  full ROM  Psychiatric:  mood stable      Labs:                          8.1    8.97  )-----------( 226      ( 04 Dec 2023 05:35 )             25.2     CBC Full  -  ( 04 Dec 2023 05:35 )  WBC Count : 8.97 K/uL  RBC Count : 2.62 M/uL  Hemoglobin : 8.1 g/dL  Hematocrit : 25.2 %  Platelet Count - Automated : 226 K/uL  Mean Cell Volume : 96.6 fl  Mean Cell Hemoglobin : 31.0 pg  Mean Cell Hemoglobin Concentration : 32.1 gm/dL  Auto Neutrophil # : x  Auto Lymphocyte # : x  Auto Monocyte # : x  Auto Eosinophil # : x  Auto Basophil # : x  Auto Neutrophil % : x  Auto Lymphocyte % : x  Auto Monocyte % : x  Auto Eosinophil % : x  Auto Basophil % : x    PT/INR - ( 04 Dec 2023 05:35 )   PT: 15.7 sec;   INR: 1.39          PTT - ( 04 Dec 2023 05:35 )  PTT:34.1 sec    12-04    139  |  108  |  17  ----------------------------<  112<H>  3.6   |  23  |  2.34<H>    Ca    8.7      04 Dec 2023 05:35  Phos  2.9     12-04  Mg     1.8     12-04    TPro  8.0  /  Alb  2.9<L>  /  TBili  7.4<H>  /  DBili  x   /  AST  86<H>  /  ALT  59<H>  /  AlkPhos  164<H>  12-04      Urinalysis Basic - ( 04 Dec 2023 05:35 )    Color: x / Appearance: x / SG: x / pH: x  Gluc: 112 mg/dL / Ketone: x  / Bili: x / Urobili: x   Blood: x / Protein: x / Nitrite: x   Leuk Esterase: x / RBC: x / WBC x   Sq Epi: x / Non Sq Epi: x / Bacteria: x

## 2023-12-05 NOTE — CHART NOTE - NSCHARTNOTEFT_GEN_A_CORE
Hematology/Oncology Chart Note    See consult note 12/4/23 for considerations and recommendations. Relayed information to patient that M-spike appears stable and chronic, with mild gamma globulin paraprotein identified. Noted P-ANCA positivity, and patient not amenable to renal biopsy/not indicated per Nephrology. No plans for bone marrow biopsy at this time.    At this time, Hematology/Oncology will sign off. Please contact with further questions and considerations, or clinical status changes.

## 2023-12-05 NOTE — PROGRESS NOTE ADULT - ASSESSMENT
A/p: 78M w PMH Crohn's, AFib/Flutter s/p DCCVs on amiodarone, remote ileocectomy and open appendectomy. Admitted (6/23) for SBO vs Crohns flare, s/p NGT decompression and s/p lap converted to open TRE, SBR x 3, left in discontinuity with abthera vac on (6/27), RTOR for ileocolic resection, small bowel anastomosis, and abdominal wall closure on (6/28), c/b fluid collection s/p IR aspiration of perihepatic fluid on (7/3), c/b wound dehiscence s/p RTOR exlap, washout, ileocolic resection with end ileostomy, blow hole colostomy, red rubber from ileostomy to small bowel anastomosis; vicryl bridging mesh on (7/5) transferred to SICU postoperatively for hemodynamic monitoring, with hospital course complicated by periods of severe ELVI and hyponatremia, which resolved but stepped back up for to SICU on (9/10) for acute AMS, intermittent hypoglycemia, AFib with RVR. Percutaneous Cholecystostomy placed on (9/11) for enlarged GB, PCT capped 10/5 and uncapped 10/25 for hyperbilirubinemia, Right brachial DVT, left basillic and cephalic superficial thrombus on duplex 11/2, CT 11/14 with ALDEN ground glass opacity of unclear etiology, completed empiric 7day cefepime course 11/22, rising T-bili of unclear etilogy, now w fungemia (candida glabrata) CLABSI, and worsening renal function.    NEURO: AMS resolved, likely septic encephalopathy, EEG neg. Hx of depression - cont Effexor. Nausea: Zofran PRN. Anxiety: Lorazepam PRN.  CV: Hx Afib/flutter: s/p DCCV, Amio stopped due to persistent transaminitis. Continue Metoprolol 5q6 with hold parameters. Hx HLD: Lipitor held given high LFTs. TTE  (7/18) - PASP 64mmHg, EF 65-70%, Hx HTN - stopped PO Norvasc as unlikely absorbing, switch to hydralazine and Lopressor IV, holding Losartan. Appears euvolemic on exam.  PULM: CT from 11/14 with ALDEN ground glass opacity of unclear etiology. COVID negative. RVP negative. completed 7d empiric cefepime 11/22 to cover for potential PNA. Now on RA.   GI/FEN: Low res, low fat, high protein diet. Cont Ensure max x1/day. 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 will replace PICC line on 12/4 and restart TPN.  High output EC fistula: cont Octreotide & Cholestyramine 10/18. Transaminitis elevated T bili of unclear origin--consult to Team 1 Sgy, s/p Perc Deb on 9/11; MRCP 10/30 no obstruction, seen by Hepatology started on ursodiol, RUQ US 11/25 CBD only 5mm, hepatic vessels patent  Requesting repeat Duplex w portal venous and arterial system, as well as image of ampulla. MRCP neg, cont PPI. Monitor drainage from fistula, bolus as needed to keep I&O even. Ensure once daily with LPS. On D10NS@110 for now while holding TPN. Folate, thiamine added.  : Voids. ELVI, Cr peaking, Urine lytes FeUrea Intrinsic ATN, unlikely volume down as spec grav low and good UOP. MARLO Duplex and RP US unremarkable, CK wnl. Bicarb drip d/c'ed 12/4. f/u renal recs pending glomerulonephritis labs  ENDO: Hx hypothyroid: IV Synthroid, TSH low on 11/30, decreased synthroid dose, recheck TSH 12/6  ID: Per ID, unlikely Caspo leading to ELVI, so will continue course. Chest CT with ALDEN with ground glass opacities, unclear etiology. bld cx from 11/22 grew candida glabrata. likely CLABSI. PICC removed 11/24.  ID consulted, Cont Fluconazole ( 12/1-). Repeat surveillance blood cx NGTD. ophthalmology evaulated - normal ocular exam. echo no vegetation, will replace picc line on 12/4.  off cefepime/flagyl. recent MRSA swab negative, RVP panel negative, COVID negative this past week. Cont Nystatin powder // PREVIOUSLY  caspofungin (11/24-12/1). completed empiric 7days cefepime (11/15-11/22), had C. tertium, Lactobacillis from his IR cx 7/3, and candida albicans, lactobacillus, vanc sensitive E faecium, vanc resistant E gallinarum, vanc resistant E casseliflavus, lactobacillus from his OR cx 7/5. Completed course of abx with Imipenem (9/10-9/15). Imipenem (8/26--) imipenem (6/30-7/12, 7/23-7/30), Dapto (6/30-7/5 and 7/23-7/24). Empiric dapto (8/23-24) and cefepime (8/23-24).   PPX: SCDs, SQH, was on Therapeutic lovenox bid for R brachial vein DVT, but will hold off on hep gtt since repeat US Duplex negative.  HEME: Anemia: continue Epogen weekly. S/p Iron Sucrose 200 qd x 3 days for chronic anemia.  LINES: PIVs // DC: LUE PICC (9/1-11/1 ), RUE PICC: (11/1-11/24)   WOUNDS/DRAINS: Abdominal wound and fistula with wound manager in place dressing change Tuesday and Friday. RLQ Ostomy. IR perc deb tube. // DC: L Clay drain.  PT: Ambulate as tolerated   DISPO: SICU due to complicated hospital course.     A/p: 78M w PMH Crohn's, AFib/Flutter s/p DCCVs on amiodarone, remote ileocectomy and open appendectomy. Admitted (6/23) for SBO vs Crohns flare, s/p NGT decompression and s/p lap converted to open TRE, SBR x 3, left in discontinuity with abthera vac on (6/27), RTOR for ileocolic resection, small bowel anastomosis, and abdominal wall closure on (6/28), c/b fluid collection s/p IR aspiration of perihepatic fluid on (7/3), c/b wound dehiscence s/p RTOR exlap, washout, ileocolic resection with end ileostomy, blow hole colostomy, red rubber from ileostomy to small bowel anastomosis; vicryl bridging mesh on (7/5) transferred to SICU postoperatively for hemodynamic monitoring, with hospital course complicated by periods of severe ELVI and hyponatremia, which resolved but stepped back up for to SICU on (9/10) for acute AMS, intermittent hypoglycemia, AFib with RVR. Percutaneous Cholecystostomy placed on (9/11) for enlarged GB, PCT capped 10/5 and uncapped 10/25 for hyperbilirubinemia, Right brachial DVT, left basillic and cephalic superficial thrombus on duplex 11/2, CT 11/14 with ALDEN ground glass opacity of unclear etiology, completed empiric 7day cefepime course 11/22, rising T-bili of unclear etilogy, now w fungemia (candida glabrata) CLABSI, ELVI     NEURO: AMS resolved, likely septic encephalopathy, EEG neg. Hx of depression - cont Effexor. Nausea: Zofran PRN. Anxiety: Lorazepam PRN.  CV: Hx Afib/flutter: s/p DCCV, Amio stopped due to persistent transaminitis. Continue Metoprolol 5q6 with hold parameters. Hx HLD: Lipitor held given high LFTs. TTE  (7/18) - PASP 64mmHg, EF 65-70%, Hx HTN - stopped PO Norvasc as unlikely absorbing, switch to hydralazine and Lopressor IV, holding Losartan.   PULM: CT from 11/14 with ALDEN ground glass opacity of unclear etiology. COVID negative. RVP negative. completed 7d empiric cefepime 11/22 to cover for potential PNA. Now no resp distress on room air.   GI/FEN: Low res, low fat, high protein diet. Cont Ensure max x1/day. 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 PICC replaced on 12/4 and continue TPN qd, lipids MWF.  High output EC fistula: cont Octreotide & Cholestyramine 10/18. Transaminitis elevated T bili of unclear origin, consulted Team 1 Sgy (Ty), also talked to GI regarding EUS to eval ampulla, GI deferring decision pending further evaluation by hepatalogy team, s/p Perc Deb on 9/11; MRCP 10/30 no obstruction, seen by Hepatology started on ursodiol, RUQ US 11/25 CBD only 5mm, hepatic vessels patent  Requesting repeat Duplex w portal venous and arterial system, as well as image of ampulla. MRCP neg, cont PPI. Monitor drainage from fistula, bolus as needed to keep I&O even. Ensure once daily with LPS. Folate, thiamine added.  : Voids. ELVI, Cr peaked. s/p brief bicarb gtts.  good UOP. monitor fistula output, keep I&O even with boluses PRN.  MARLO Duplex and RP US unremarkable, CK wnl.  f/u renal recs pending glomerulonephritis labs  ENDO: Hx hypothyroid: IV Synthroid, TSH low on 11/30, decreased synthroid dose, recheck TSH 12/6  ID: Per ID, unlikely Caspo leading to ELVI, Chest CT with ALDEN with ground glass opacities, unclear etiology. bld cx from 11/22 grew candida glabrata. likely CLABSI. PICC removed 11/24.  ID consulted, s/p Caspo, switched to Fluconazole (12/1-). Repeat surveillance blood cx NGTD. ophthalmology evaulated - normal ocular exam. echo no vegetation, will replace picc line on 12/4.  off cefepime/flagyl. recent MRSA swab negative, RVP panel negative, COVID negative this past week. Cont Nystatin powder // PREVIOUSLY  caspofungin (11/24-12/1). completed empiric 7days cefepime (11/15-11/22), had C. tertium, Lactobacillis from his IR cx 7/3, and candida albicans, lactobacillus, vanc sensitive E faecium, vanc resistant E gallinarum, vanc resistant E casseliflavus, lactobacillus from his OR cx 7/5. Completed course of abx with Imipenem (9/10-9/15). Imipenem (8/26--) imipenem (6/30-7/12, 7/23-7/30), Dapto (6/30-7/5 and 7/23-7/24). Empiric dapto (8/23-24) and cefepime (8/23-24).   PPX: SCDs, SQH, was on Therapeutic lovenox bid for R brachial vein DVT, but will hold off on hep gtt since repeat US Duplex negative.  HEME: Anemia: continue Epogen weekly. S/p Iron Sucrose 200 qd x 3 days for chronic anemia.  LINES: PIVs // DC: LUE PICC (9/1-11/1 ), RUE PICC: (11/1-11/24)   WOUNDS/DRAINS: Abdominal wound and fistula with wound manager in place dressing change Tuesday and Friday. RLQ Ostomy. IR perc deb tube. // DC: L Clay drain.  PT: Ambulate as tolerated   DISPO: SICU due to complicated hospital course.     A/p: 78M w PMH Crohn's, AFib/Flutter s/p DCCVs on amiodarone, remote ileocectomy and open appendectomy. Admitted (6/23) for SBO vs Crohns flare, s/p NGT decompression and s/p lap converted to open TRE, SBR x 3, left in discontinuity with abthera vac on (6/27), RTOR for ileocolic resection, small bowel anastomosis, and abdominal wall closure on (6/28), c/b fluid collection s/p IR aspiration of perihepatic fluid on (7/3), c/b wound dehiscence s/p RTOR exlap, washout, ileocolic resection with end ileostomy, blow hole colostomy, red rubber from ileostomy to small bowel anastomosis; vicryl bridging mesh on (7/5) transferred to SICU postoperatively for hemodynamic monitoring, with hospital course complicated by periods of severe ELVI and hyponatremia, which resolved but stepped back up for to SICU on (9/10) for acute AMS, intermittent hypoglycemia, AFib with RVR. Percutaneous Cholecystostomy placed on (9/11) for enlarged GB, PCT capped 10/5 and uncapped 10/25 for hyperbilirubinemia, Right brachial DVT, left basillic and cephalic superficial thrombus on duplex 11/2, CT 11/14 with ALDEN ground glass opacity of unclear etiology, completed empiric 7day cefepime course 11/22, rising T-bili of unclear etilogy, now w fungemia (candida glabrata) CLABSI, ELVI     NEURO: AMS resolved, likely septic encephalopathy, EEG neg. Hx of depression - cont Effexor. Nausea: Zofran PRN. Anxiety: Lorazepam PRN.  CV: Hx Afib/flutter: s/p DCCV, Amio stopped due to persistent transaminitis. Continue Metoprolol 5q6 with hold parameters. Hx HLD: Lipitor held given high LFTs. TTE  (7/18) - PASP 64mmHg, EF 65-70%, Hx HTN - stopped PO Norvasc as unlikely absorbing, switch to hydralazine and Lopressor IV, holding Losartan.   PULM: CT from 11/14 with ALDEN ground glass opacity of unclear etiology. COVID negative. RVP negative. completed 7d empiric cefepime 11/22 to cover for potential PNA. Now no resp distress on room air.   GI/FEN: Low res, low fat, high protein diet. Cont Ensure max x1/day. 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 PICC replaced on 12/4 and continue TPN qd, lipids MWF.  High output EC fistula: cont Octreotide & Cholestyramine 10/18. Transaminitis elevated T bili of unclear origin, consulted Team 1 Sgy (Ty), also talked to GI regarding EUS to eval ampulla, GI deferring decision pending further evaluation by hepatalogy team, s/p Perc Deb on 9/11; MRCP 10/30 no obstruction, seen by Hepatology started on ursodiol, RUQ US 11/25 CBD only 5mm, hepatic vessels patent  Requesting repeat Duplex w portal venous and arterial system, as well as image of ampulla. MRCP neg, cont PPI. Monitor drainage from fistula, bolus as needed to keep I&O even. Ensure once daily with LPS. Folate, thiamine added.  : Voids. ELVI, Cr peaked. s/p brief bicarb gtts.  good UOP. monitor fistula output, keep I&O even with boluses PRN.  MARLO Duplex and RP US unremarkable, CK wnl.  f/u renal recs pending glomerulonephritis labs  ENDO: Hx hypothyroid: IV Synthroid, TSH low on 11/30, decreased synthroid dose, recheck TSH 12/6  ID: Per ID, unlikely Caspo leading to ELVI, Chest CT with ALDEN with ground glass opacities, unclear etiology. bld cx from 11/22 grew candida glabrata. likely CLABSI. PICC removed 11/24.  ID consulted, s/p Caspo, switched to Fluconazole (12/1-). Repeat surveillance blood cx NGTD. ophthalmology evaulated - normal ocular exam. echo no vegetation, PICC replaced on 12/4.  off cefepime/flagyl. recent MRSA swab negative, RVP panel negative, COVID negative this past week. Cont Nystatin powder // PREVIOUSLY  caspofungin (11/24-12/1). completed empiric 7days cefepime (11/15-11/22), had C. tertium, Lactobacillis from his IR cx 7/3, and candida albicans, lactobacillus, vanc sensitive E faecium, vanc resistant E gallinarum, vanc resistant E casseliflavus, lactobacillus from his OR cx 7/5. Completed course of abx with Imipenem (9/10-9/15). Imipenem (8/26--) imipenem (6/30-7/12, 7/23-7/30), Dapto (6/30-7/5 and 7/23-7/24). Empiric dapto (8/23-24) and cefepime (8/23-24).   PPX: SCDs, SQH, was on Therapeutic lovenox bid for R brachial vein DVT, but will hold off on hep gtt since repeat US Duplex negative.  HEME: Anemia: continue Epogen weekly. S/p Iron Sucrose 200 qd x 3 days for chronic anemia.  LINES: PIVs // DC: LUE PICC (9/1-11/1 ), RUE PICC: (11/1-11/24)   WOUNDS/DRAINS: Abdominal wound and fistula with wound manager in place dressing change Tuesday and Friday. RLQ Ostomy. IR perc deb tube. // DC: L Clay drain.  PT: Ambulate as tolerated   DISPO: SICU due to complicated hospital course.

## 2023-12-05 NOTE — PROGRESS NOTE ADULT - NS ATTEND AMEND GEN_ALL_CORE FT
Restarted TPN via signle lumen picc  Daily weights  will discuss with hepatology further biliary assessment    Decision making high complexity

## 2023-12-05 NOTE — PROGRESS NOTE ADULT - ASSESSMENT
A/p: 78M w PMH Crohn's, AFib/Flutter s/p DCCVs on amiodarone, remote ileocectomy and open appendectomy. Admitted (6/23) for SBO vs Crohns flare, s/p NGT decompression and s/p lap converted to open TRE, SBR x 3, left in discontinuity with abthera vac on (6/27), RTOR for ileocolic resection, small bowel anastomosis, and abdominal wall closure on (6/28), c/b fluid collection s/p IR aspiration of perihepatic fluid on (7/3), c/b wound dehiscence s/p RTOR exlap, washout, ileocolic resection with end ileostomy, blow hole colostomy, red rubber from ileostomy to small bowel anastomosis; vicryl bridging mesh on (7/5) transferred to SICU postoperatively for hemodynamic monitoring, with hospital course complicated by periods of severe ELVI and hyponatremia, which resolved but stepped back up for to SICU on (9/10) for acute AMS, intermittent hypoglycemia, AFib with RVR. Percutaneous Cholecystostomy placed on (9/11) for enlarged GB, PCT capped 10/5 and uncapped 10/25 for hyperbilirubinemia, Right brachial DVT, left basillic and cephalic superficial thrombus on duplex 11/2, CT 11/14 with ALDEN ground glass opacity of unclear etiology, completed empiric 7day cefepime course 11/22, rising T-bili of unclear etilogy, now w fungemia (candida glabrata) CLABSI, and worsening renal function.    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

## 2023-12-05 NOTE — ADVANCED PRACTICE NURSE CONSULT - ASSESSMENT
2 3/4" Jayleen convex skin barrier applied over fistula areas after protecting perifistular areas well with Cavilon barrier wipes and stoma rings. High output pouch used since effluent is becoming thicker, hooked to drainage bag, RENETTA Du aware. Blood mixed with thicker effluent in old appliance, Dr. Samuel made aware. Smaller fistula noted about fistulized stoma, medium brown thick effluent being produced.

## 2023-12-05 NOTE — PROGRESS NOTE ADULT - ATTENDING COMMENTS
ELVI likely ATN - improving   would cont to allow to be gently negative with edema and HTN - seems improved  may consider kidney bx if renal fxn stops improving-- pt says does not want at this point and would not recommend yet anyway

## 2023-12-05 NOTE — PROGRESS NOTE ADULT - SUBJECTIVE AND OBJECTIVE BOX
SUBJECTIVE: Patient seen and examined bedside by chief resident. TAN. Endorses good pain control with medication. Denies nausea/vomiting tolerating diet. Endorses flatus and bowel movements into bag. No F/C.     fluconAZOLE IVPB 600 milliGRAM(s) IV Intermittent every 24 hours  heparin   Injectable 5000 Unit(s) SubCutaneous every 8 hours  hydrALAZINE Injectable 40 milliGRAM(s) IV Push every 6 hours  metoprolol tartrate Injectable 5 milliGRAM(s) IV Push every 6 hours    MEDICATIONS  (PRN):  LORazepam   Injectable 0.5 milliGRAM(s) IV Push at bedtime PRN Anxiety  ondansetron Injectable 4 milliGRAM(s) IV Push every 6 hours PRN Nausea and/or Vomiting      I&O's Detail    04 Dec 2023 07:01  -  05 Dec 2023 07:00  --------------------------------------------------------  IN:    dextrose 10% + sodium chloride 0.45% w/ Additives: 220 mL    dextrose 10% + sodium chloride 0.9%: 880 mL    Fat Emulsion (Fish Oil &amp; Plant Based) 20% Infusion: 256.8 mL    IV PiggyBack: 300 mL    Oral Fluid: 440 mL    TPN (Total Parenteral Nutrition): 765.3 mL  Total IN: 2862.1 mL    OUT:    Drain (mL): 720 mL    Drain (mL): 35 mL    Drain (mL): 1425 mL    Voided (mL): 1325 mL  Total OUT: 3505 mL    Total NET: -642.9 mL      05 Dec 2023 07:01  -  05 Dec 2023 08:17  --------------------------------------------------------  IN:    TPN (Total Parenteral Nutrition): 53.1 mL  Total IN: 53.1 mL    OUT:  Total OUT: 0 mL    Total NET: 53.1 mL          Vital Signs Last 24 Hrs  T(C): 36.9 (05 Dec 2023 05:13), Max: 37 (05 Dec 2023 00:15)  T(F): 98.4 (05 Dec 2023 05:13), Max: 98.6 (05 Dec 2023 00:15)  HR: 97 (05 Dec 2023 07:00) (91 - 98)  BP: 154/70 (05 Dec 2023 07:00) (146/66 - 193/67)  BP(mean): 101 (05 Dec 2023 07:00) (93 - 123)  RR: 27 (05 Dec 2023 07:00) (19 - 35)  SpO2: 93% (05 Dec 2023 07:00) (92% - 97%)    Parameters below as of 05 Dec 2023 07:00  Patient On (Oxygen Delivery Method): room air        Physical Exam:  General: NAD, resting comfortably in bed  Pulmonary: Nonlabored breathing, no respiratory distress  Cardiovascular: NSR  Abdominal: soft, NT/ND, Dustin in place, ileostomy and red rubber in place  Extremities: WWP, normal strength  Neuro: A/O x 3, CNs II-XII grossly intact, no focal deficits      LABS:                        8.1    11.07 )-----------( 227      ( 05 Dec 2023 05:21 )             25.5     12-05    137  |  105  |  19  ----------------------------<  106<H>  4.3   |  24  |  2.18<H>    Ca    8.5      05 Dec 2023 05:21  Phos  2.9     12-04  Mg     2.1     12-05    TPro  8.3  /  Alb  2.9<L>  /  TBili  7.6<H>  /  DBili  4.8<H>  /  AST  93<H>  /  ALT  61<H>  /  AlkPhos  154<H>  12-05    PT/INR - ( 04 Dec 2023 05:35 )   PT: 15.7 sec;   INR: 1.39          PTT - ( 04 Dec 2023 05:35 )  PTT:34.1 sec  Urinalysis Basic - ( 05 Dec 2023 05:21 )    Color: x / Appearance: x / SG: x / pH: x  Gluc: 106 mg/dL / Ketone: x  / Bili: x / Urobili: x   Blood: x / Protein: x / Nitrite: x   Leuk Esterase: x / RBC: x / WBC x   Sq Epi: x / Non Sq Epi: x / Bacteria: x        RADIOLOGY & ADDITIONAL STUDIES:

## 2023-12-05 NOTE — PROGRESS NOTE ADULT - ASSESSMENT
79 yo M with prolonged hospital course, admited for SBO and CD flare s/p multiple GI surgeries with wound dehiscense, ileostomy EC fistula, PCT placement with worsening LFTs, recurrent ELVI in the setting of candidemia. Nephrology reconsulted for ELVI.    Imp: ELVI, suspect hemodynamic/septic ATN. Ddx includes GN with recurrent hematuria and paraprotein-related renal disease.    Plan:  - SCr peaked at 2.9, now downtrending to 2.2. Baseline ~0.9.  - Mild proteinuria 0.8 UPCR  - No hydronephrosis on US  - Urine with bence-stone protein, FLCR 1  - p-ANCA, ELEUTERIO pos  - Awaiting GN workup - cryoglobulins pending -- but these less likely with recovery  - Would not pursue renal biopsy at this time with renal recovery, also patient states he would not be amenable to biopsy. Will continue to follow.  - Currently on Fluconazole for candidemia.  - On parenteral nutrition now - would recommend decreasing sodium content of TPN from 154meq to 100meq given hypertension  - Maintain slightly net negative fluid status  - Daily BMP  - Maintain MAP >65 for renal perfusion  - Strict I&O, daily weights

## 2023-12-05 NOTE — PROGRESS NOTE ADULT - SUBJECTIVE AND OBJECTIVE BOX
S: a little nausea, no vomiting, wound manager leaking overnight. no abd pain  No other new issues/events overnight, no new med c/o    O: ICU Vital Signs Last 24 Hrs  T(F): 97.8 (12-05-23 @ 11:00), Max: 98.6 (12-05-23 @ 00:15)  HR: 97 (12-05-23 @ 11:00) (91 - 98)  BP: 165/74 (12-05-23 @ 11:00) (137/65 - 193/67)  BP(mean): 106 (12-05-23 @ 11:00) (93 - 120)  ABP: --  RR: 25 (12-05-23 @ 11:00) (19 - 35)  SpO2: 93% (12-05-23 @ 11:00) (92% - 97%)    PHYSICAL EXAM:   Neurological: AAOx3, CNII-XII intact,  strength 5/5 b/l  ENT: mucus membrane moist  Cardiovascular: RRR  Respiratory: CTA  Gastrointestinal: soft, NT, ND, BS+, Per cony bilious drainage. abd wound granulating well, fistula with brownish output. no active bleeding.   Extremities: warm, no dependent edema  Vascular: no cyanosis/erythema  Skin: no rashes  MSK: no joint swelling.     LABS:    12-05    137  |  105  |  19  ----------------------------<  106<H>  4.3   |  24  |  2.18<H>    Ca    8.5      05 Dec 2023 05:21  Phos  2.9     12-04  Mg     2.1     12-05    TPro  8.3  /  Alb  2.9<L>  /  TBili  7.6<H>  /  DBili  4.8<H>  /  AST  93<H>  /  ALT  61<H>  /  AlkPhos  154<H>  12-05  LIVER FUNCTIONS - ( 05 Dec 2023 05:21 )  Alb: 2.9 g/dL / Pro: 8.3 g/dL / ALK PHOS: 154 U/L / ALT: 61 U/L / AST: 93 U/L / GGT: x                               8.1    11.07 )-----------( 227      ( 05 Dec 2023 05:21 )             25.5   PT/INR - ( 04 Dec 2023 05:35 )   PT: 15.7 sec;   INR: 1.39          PTT - ( 04 Dec 2023 05:35 )  PTT:34.1 sec  Urinalysis Basic - ( 05 Dec 2023 05:21 )    Color: x / Appearance: x / SG: x / pH: x  Gluc: 106 mg/dL / Ketone: x  / Bili: x / Urobili: x   Blood: x / Protein: x / Nitrite: x   Leuk Esterase: x / RBC: x / WBC x   Sq Epi: x / Non Sq Epi: x / Bacteria: x    CAPILLARY BLOOD GLUCOSE        MEDICATIONS  (STANDING):  chlorhexidine 2% Cloths 1 Application(s) Topical <User Schedule>  cholestyramine Powder (Sugar-Free) 4 Gram(s) Oral daily  epoetin matt-epbx (RETACRIT) Injectable 52606 Unit(s) SubCutaneous every 7 days  ergocalciferol 78050 Unit(s) Oral <User Schedule>  famotidine Injectable 20 milliGRAM(s) IV Push daily  fluconAZOLE IVPB 600 milliGRAM(s) IV Intermittent every 24 hours  glucagon  Injectable 1 milliGRAM(s) IntraMuscular once  heparin   Injectable 5000 Unit(s) SubCutaneous every 8 hours  hydrALAZINE Injectable 40 milliGRAM(s) IV Push every 6 hours  levothyroxine Injectable 30 MICROGram(s) IV Push at bedtime  metoprolol tartrate Injectable 5 milliGRAM(s) IV Push every 6 hours  Parenteral Nutrition - Adult 1 Each TPN Continuous <Continuous>  Parenteral Nutrition - Adult 1 Each TPN Continuous <Continuous>  ursodiol Suspension 300 milliGRAM(s) Oral every 8 hours  venlafaxine XR. 150 milliGRAM(s) Oral two times a day    MEDICATIONS  (PRN):  LORazepam   Injectable 0.5 milliGRAM(s) IV Push at bedtime PRN Anxiety  ondansetron Injectable 4 milliGRAM(s) IV Push every 6 hours PRN Nausea and/or Vomiting      Poole:	  [x ] None	[ ] Daily Poole Order Placed	   Indication:	  [ ] Strict I and O's    [ ] Obstruction     [ ] Incontinence + Stage 3 or 4 Decubitus  Central Line:  [ ] None	   [ x]  Medication / TPN Administration     [ ] No Peripheral IV        S: a little nausea, no vomiting, wound manager leaking overnight. no abd pain  No other new issues/events overnight, no new med c/o    O: ICU Vital Signs Last 24 Hrs  T(F): 97.8 (12-05-23 @ 11:00), Max: 98.6 (12-05-23 @ 00:15)  HR: 97 (12-05-23 @ 11:00) (91 - 98)  BP: 165/74 (12-05-23 @ 11:00) (137/65 - 193/67)  BP(mean): 106 (12-05-23 @ 11:00) (93 - 120)  ABP: --  RR: 25 (12-05-23 @ 11:00) (19 - 35)  SpO2: 93% (12-05-23 @ 11:00) (92% - 97%)    PHYSICAL EXAM:   Neurological: AAOx3, CNII-XII intact,  strength 5/5 b/l  ENT: mucus membrane moist  Cardiovascular: RRR  Respiratory: CTA  Gastrointestinal: soft, NT, ND, BS+, Per cony bilious drainage. abd wound granulating well, fistula with brownish output. no active bleeding.   Extremities: warm, no dependent edema  Vascular: no cyanosis/erythema  Skin: no rashes  MSK: no joint swelling.     LABS:    12-05    137  |  105  |  19  ----------------------------<  106<H>  4.3   |  24  |  2.18<H>    Ca    8.5      05 Dec 2023 05:21  Phos  2.9     12-04  Mg     2.1     12-05    TPro  8.3  /  Alb  2.9<L>  /  TBili  7.6<H>  /  DBili  4.8<H>  /  AST  93<H>  /  ALT  61<H>  /  AlkPhos  154<H>  12-05  LIVER FUNCTIONS - ( 05 Dec 2023 05:21 )  Alb: 2.9 g/dL / Pro: 8.3 g/dL / ALK PHOS: 154 U/L / ALT: 61 U/L / AST: 93 U/L / GGT: x                               8.1    11.07 )-----------( 227      ( 05 Dec 2023 05:21 )             25.5   PT/INR - ( 04 Dec 2023 05:35 )   PT: 15.7 sec;   INR: 1.39          PTT - ( 04 Dec 2023 05:35 )  PTT:34.1 sec  Urinalysis Basic - ( 05 Dec 2023 05:21 )    Color: x / Appearance: x / SG: x / pH: x  Gluc: 106 mg/dL / Ketone: x  / Bili: x / Urobili: x   Blood: x / Protein: x / Nitrite: x   Leuk Esterase: x / RBC: x / WBC x   Sq Epi: x / Non Sq Epi: x / Bacteria: x    CAPILLARY BLOOD GLUCOSE        MEDICATIONS  (STANDING):  chlorhexidine 2% Cloths 1 Application(s) Topical <User Schedule>  cholestyramine Powder (Sugar-Free) 4 Gram(s) Oral daily  epoetin matt-epbx (RETACRIT) Injectable 00125 Unit(s) SubCutaneous every 7 days  ergocalciferol 63479 Unit(s) Oral <User Schedule>  famotidine Injectable 20 milliGRAM(s) IV Push daily  fluconAZOLE IVPB 600 milliGRAM(s) IV Intermittent every 24 hours  glucagon  Injectable 1 milliGRAM(s) IntraMuscular once  heparin   Injectable 5000 Unit(s) SubCutaneous every 8 hours  hydrALAZINE Injectable 40 milliGRAM(s) IV Push every 6 hours  levothyroxine Injectable 30 MICROGram(s) IV Push at bedtime  metoprolol tartrate Injectable 5 milliGRAM(s) IV Push every 6 hours  Parenteral Nutrition - Adult 1 Each TPN Continuous <Continuous>  Parenteral Nutrition - Adult 1 Each TPN Continuous <Continuous>  ursodiol Suspension 300 milliGRAM(s) Oral every 8 hours  venlafaxine XR. 150 milliGRAM(s) Oral two times a day    MEDICATIONS  (PRN):  LORazepam   Injectable 0.5 milliGRAM(s) IV Push at bedtime PRN Anxiety  ondansetron Injectable 4 milliGRAM(s) IV Push every 6 hours PRN Nausea and/or Vomiting      Poole:	  [x ] None	[ ] Daily Poole Order Placed	   Indication:	  [ ] Strict I and O's    [ ] Obstruction     [ ] Incontinence + Stage 3 or 4 Decubitus  Central Line:  [ ] None	   [ x]  Medication / TPN Administration     [ ] No Peripheral IV

## 2023-12-06 NOTE — PROGRESS NOTE ADULT - ATTENDING COMMENTS
seems les overloaded  creat stable to slt better  may consider renal bx if renal fxn does not continue to improve ( if pt agrees )

## 2023-12-06 NOTE — PROCEDURE NOTE - NSUSCPTCODES_ED_ALL
99978 US Chest (PTX, Pleural Effussion/CHF vs COPD) 36540 US Chest (PTX, Pleural Effussion/CHF vs COPD)

## 2023-12-06 NOTE — PROGRESS NOTE ADULT - ASSESSMENT
79 yo M with prolonged hospital course, admited for SBO and CD flare s/p multiple GI surgeries with wound dehiscense, ileostomy EC fistula, PCT placement with worsening LFTs, recurrent ELVI in the setting of candidemia. Nephrology reconsulted for ELVI.    Imp: ELVI, suspect hemodynamic/septic ATN. Ddx includes GN with recurrent hematuria and paraprotein-related renal disease.    Plan:  - SCr peaked at 2.9, now downtrending to 2.1. Baseline ~0.9. Perhaps rate of improvement tapering off slightly.  - Mild proteinuria 0.8 UPCR  - No hydronephrosis on US  - Urine with bence-stoen protein, FLCR 1  - p-ANCA, ELEUTERIO pos  - Awaiting GN workup - cryoglobulins pending -- but these less likely with recovery  - Would not pursue renal biopsy at this time with renal recovery, also patient states he would not be amenable to biopsy. Will continue to follow.  - Currently on Fluconazole for candidemia.  - On parenteral nutrition  - Maintain slightly net negative fluid status, 1-2L/day as tolerated  - Daily BMP  - Maintain MAP >65 for renal perfusion  - Strict I&O, daily weights 79 yo M with prolonged hospital course, admited for SBO and CD flare s/p multiple GI surgeries with wound dehiscense, ileostomy EC fistula, PCT placement with worsening LFTs, recurrent ELVI in the setting of candidemia. Nephrology reconsulted for ELVI.    Imp: ELVI, suspect hemodynamic/septic ATN. Ddx includes GN with recurrent hematuria and paraprotein-related renal disease.    Plan:  - SCr peaked at 2.9, now downtrending to 2.1. Baseline ~0.9. Perhaps rate of improvement tapering off slightly.  - Mild proteinuria 0.8 UPCR  - No hydronephrosis on US  - Urine with bence-stone protein, FLCR 1  - p-ANCA, ELEUTERIO pos  - Awaiting GN workup - cryoglobulins pending -- but these less likely with recovery  - Would not pursue renal biopsy at this time with renal recovery, also patient states he would not be amenable to biopsy. Will continue to follow.  - Currently on Fluconazole for candidemia.  - On parenteral nutrition  - Maintain slightly net negative fluid status, 1-2L/day as tolerated  - Daily BMP  - Maintain MAP >65 for renal perfusion  - Strict I&O, daily weights

## 2023-12-06 NOTE — CHART NOTE - NSCHARTNOTEFT_GEN_A_CORE
Admitting Diagnosis:   Patient is a 78y old  Male who presents with a chief complaint of sbo       PAST MEDICAL & SURGICAL HISTORY:  Essential hypertension  Hypertension      Atrial fibrillation  s/p cardioversion 2010 and 2014  Pt. reports 4 DCCV  Now on Amiodarone 200mg PO bid and Eliquis 5mg PO bid  Last DCCV 4 yrs ago at [facility]      Crohn's disease  s/p partial resection of ileum      Hyperlipidemia      Hypothyroidism      History of depression  On Venlafaxine ER 150mg PO bid      Junctional rhythm      H/O knee surgery      History of cataract surgery          Current Nutrition Order:  PO- Low fiber, Low fat diet + 2 LPS per day [provide 100 kcals, 15g protein each], + Ensure Max daily [150 kcals, 20g protein]  TPN: 1900ml, 460g dextrose, 100g amino acids, 50g SMOF lipids, 2464kcal, 100g protein    PO Intake: Good (%) [   ]  Fair (50-75%) [ X  ] Poor (<25%) [   ]    GI Issues:   12/5: ileostomy output 35cc x 24hrs, abdominal wound/fistula output  250cc x 24hrs, per cony output 745cc x 24hrs    12/1: ileostomy output 30cc x 24hrs, abdominal wound/fistula output  2400cc x 24hrs, per cony output 550cc x 24hrs   11/27: ileostomy output 50 cc x 24hrs, abdominal wound/fistula output 2175 cc x 24hrs, per cony output 530 cc x 24hrs   11/22: ileostomy output 25cc x 24hrs, abdominal wound/fistula output 2350 cc x 24hrs, per cony output 775cc x 24hrs   11/20: ileostomy output 55cc x 24hrs, abdominal wound/fistula output 2025 cc x 24hrs, per cony output 450cc x 24hrs   11/15:  ileostomy output 65cc x 24hrs, abdominal wound/fistula output 875cc x 24hrs, per cony output 775cc x 24hrs    Pain: no pain/discomfort noted    Skin Integrity: jaundice, abd wound and fistula with wound manager in place, RLQ ileostomy, perc cony drain, lower back/healed wound, Rudy score 18    Labs:   12-06    140  |  102  |  27<H>  ----------------------------<  129<H>  4.7   |  26  |  2.10<H>    Ca    8.5      06 Dec 2023 05:30  Mg     2.1     12-05    TPro  8.2  /  Alb  2.9<L>  /  TBili  6.9<H>  /  DBili  4.4<H>  /  AST  98<H>  /  ALT  59<H>  /  AlkPhos  143<H>  12-06      Medications:  MEDICATIONS  (STANDING):  chlorhexidine 2% Cloths 1 Application(s) Topical <User Schedule>  cholestyramine Powder (Sugar-Free) 4 Gram(s) Oral daily  epoetin matt-epbx (RETACRIT) Injectable 50502 Unit(s) SubCutaneous every 7 days  ergocalciferol 26429 Unit(s) Oral <User Schedule>  famotidine Injectable 20 milliGRAM(s) IV Push daily  fluconAZOLE IVPB 600 milliGRAM(s) IV Intermittent every 24 hours  glucagon  Injectable 1 milliGRAM(s) IntraMuscular once  heparin   Injectable 5000 Unit(s) SubCutaneous every 8 hours  hydrALAZINE Injectable 40 milliGRAM(s) IV Push every 6 hours  levothyroxine Injectable 30 MICROGram(s) IV Push at bedtime  lipid, fat emulsion (Fish Oil and Plant Based) 20% Infusion 0.55 Gm/kG/Day (21.4 mL/Hr) IV Continuous <Continuous>  metoprolol tartrate Injectable 5 milliGRAM(s) IV Push every 6 hours  Parenteral Nutrition - Adult 1 Each TPN Continuous <Continuous>  Parenteral Nutrition - Adult 1 Each TPN Continuous <Continuous>  ursodiol Suspension 300 milliGRAM(s) Oral every 8 hours  venlafaxine XR. 150 milliGRAM(s) Oral two times a day    MEDICATIONS  (PRN):  LORazepam   Injectable 0.5 milliGRAM(s) IV Push at bedtime PRN Anxiety  ondansetron Injectable 4 milliGRAM(s) IV Push every 6 hours PRN Nausea and/or Vomiting        Daily 94.2kg [28 Nov 2023]  Daily 94.4kg [27 Nov 2023]  Daily 94.2kg [26 Nov 2023]  Daily 94.2kg [22 Nov 2023]  Daily 94.2kg [16 Nov 2023]  Daily 94.2 [15 Nov 2023]  Daily 94.2kg [13 Nov 2023]  Daily 95.8kg [09 Nov 2023]  Daily 94.2kg [07 Nov 2023]  Daily 85.2kg [03 Nov 2023]  Daily 85.2kg [31 Oct 2023]  Daily 85.2kg [24 Oct 2023]  Daily 85.2kg [20 Oct 2023]  Daily 81.9kg [18 Oct 2023]  Daily 85.2kg [16 Oct 2023]  Daily   95.2kg [12 Oct 2023]   Daily 87.7kg [11 Oct 2023]  Daily 87.4kg [09 Oct 2023]  Daily 86.4kg [07 Oct 2023]  Daily 82.5kg [06 Oct 2023]  Daily 82.6kg [4 Oct 2023]   Daily 83.5kg [03 Oct 2023]  Daily 84.5kg [2 Oct 2023]  Daily 87.2kg [1 Oct 2023]  Daily 88.3kg [29 Sep 2023]  Daily 89.9kg [28 Sep 2023]  Daily 87.7kg [24 Sep 2023]  Daily 90.4kg [21 Sep 2023]  Daily 91.4kg [20 Sep 2023]  Daily 86.7kg [18 Sep 2023]  Daily 88.1kg [16 Sep 2023]  Daily 88.9kg [15 Sep 2023]   Daily 89.1 [14 Sep 2023]  Daily 92.9kg [6 Sep 2023]  Daily 91.8kg [27 Aug 2023]  Daily 101kg [9 Aug 2023]       Weight Change: weight trending down throughout admission, now appears to be trending back up. Recommend continue weekly/daily weights for trending & ensuring adequacy of nutrition provision    IBW: 86.4kg  % IBW: 109.0%    Estimated energy needs:   Ideal body weight (86.4kg) used for calculations as pt >100% IBW and BMI <30 per [facility] Standards of Care. Needs estimated for age and adjusted for current clinical status, increased needs for post-op & open abd wound healing    Calories: 4216-4420 kcals based on 30-40 kcals/kg  Protein: 129.6-172.8 g protein based on 1.5-2.0g protein/kg + additional depending on outputs  *Fluid needs per team    Subjective:   78 M w/ Crohn's, AFib/Flutter s/p DCCVs on amiodarone, remote ileocectomy and open appendectomy. Admitted (6/23) for SBO vs Crohns flare, s/p NGT decompression and s/p lap TRE converted to open TRE, SBR x 3, left in discontinuity with abthera vac on (6/27), RTOR for ileocolic resection, small bowel anastomosis, and abdominal wall closure on (6/28), c/b fluid collection s/p IR aspiration of perihepatic fluid on (7/3), c/b wound dehiscence s/p RTOR exlap, washout, ileocolic resection with end ileostomy, blow hole colostomy, red rubber from ileostomy to small bowel anastomosis; vicryl bridging mesh on (7/5) transferred to SICU postoperatively for hemodynamic monitoring, with hospital course complicated by periods of AMS most recently on 9/1 and associated with oliguria, severe ELVI and hyponatremia, which resolved but stepped back up for to SICU on 9/10 for acute AMS, intermittent hypoglycemia, AFib with RVR. Percutaneous Cholecystostomy placed on 9/11 for enlarged GB, PCT capped 10/5.    Chart reviewed. 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 PICC replaced on 12/4 and continue TPN with SMOF lipids MWF. 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 Continues with high output EC fistula on Octreotide & Cholestyramine since 10/18. Likely with inadequate nutrition due to losses, requiring parenteral nutrition support at this time. Also with noted transaminitis. RDN will continue to monitor, reassess, and intervene as appropriate.     Previous Nutrition Diagnosis: Increased Nutrient Needs R/T physiological demands for nutrient AEB post-op wound healing    Active [ X  ]  Resolved [   ]    If resolved, new PES:     Goal:  Resume TPN as soon as medically feasible    Recommendations:  1. Continue TPN as soon as medically feasible  - Recommend goal- 361g Dex, 144g AA daily over 18hrs; 50g SMOF lipids ***lipids only 3x/week- MWF schedule***  - to provide daily average of 2017.7 kcals daily, 144g protein, GIR 2.66 mg/kg/min [23.4 kcals/kg, 16.7 non-protein kcals/kg, 1.67g protein/kg IBW]  - consider increasing copper and zinc [low per latest labs]  - recommend cycling TPN over 12-16hrs as feasible  - monitor weights & wound healing, adjust substrates as indicated  2. Continue Vit D supplementation  - recommend to recheck levels 12/28  4. PO diet per team discretion  - recommend Low fat PO diet + 1 LPS BID [200 kcals, 30g protein] + 1 Ensure Max daily [150 kcals, 30g protein] -- d/c low fiber restriction  - consider NPO for bowel rest as indicated   - ongoing diet education prn  5. Hydration per team  - risk for dehydration with high outputs, monitor  - additional fluids prn  6. Monitor renal function  7. Fluids & lytes per team  - consider oral rehydration solution prn  8. Weekly lipid panel while on TPN  - hold/decrease lipids if TG >400  9. Monitor BMP/Mg/Phos, POC BG while on TPN  - monitor & replenish lytes outside TPN bag prn  10. Continue close monitoring of clinical course & adjust recommendations prn    Education: ongoing diet education for PO recs    Risk Level: High [ X  ] Moderate [   ] Low [   ]. Admitting Diagnosis:   Patient is a 78y old  Male who presents with a chief complaint of sbo       PAST MEDICAL & SURGICAL HISTORY:  Essential hypertension  Hypertension      Atrial fibrillation  s/p cardioversion 2010 and 2014  Pt. reports 4 DCCV  Now on Amiodarone 200mg PO bid and Eliquis 5mg PO bid  Last DCCV 4 yrs ago at [facility]      Crohn's disease  s/p partial resection of ileum      Hyperlipidemia      Hypothyroidism      History of depression  On Venlafaxine ER 150mg PO bid      Junctional rhythm      H/O knee surgery      History of cataract surgery          Current Nutrition Order:  PO- Low fiber, Low fat diet + 2 LPS per day [provide 100 kcals, 15g protein each], + Ensure Max daily [150 kcals, 20g protein]  TPN: 1900ml, 460g dextrose, 100g amino acids, 50g SMOF lipids, 2464kcal, 100g protein    PO Intake: Good (%) [   ]  Fair (50-75%) [ X  ] Poor (<25%) [   ]    GI Issues:   12/5: ileostomy output 35cc x 24hrs, abdominal wound/fistula output  250cc x 24hrs, per cony output 745cc x 24hrs    12/1: ileostomy output 30cc x 24hrs, abdominal wound/fistula output  2400cc x 24hrs, per cony output 550cc x 24hrs   11/27: ileostomy output 50 cc x 24hrs, abdominal wound/fistula output 2175 cc x 24hrs, per cony output 530 cc x 24hrs   11/22: ileostomy output 25cc x 24hrs, abdominal wound/fistula output 2350 cc x 24hrs, per cony output 775cc x 24hrs   11/20: ileostomy output 55cc x 24hrs, abdominal wound/fistula output 2025 cc x 24hrs, per cony output 450cc x 24hrs   11/15:  ileostomy output 65cc x 24hrs, abdominal wound/fistula output 875cc x 24hrs, per cony output 775cc x 24hrs    Pain: no pain/discomfort noted    Skin Integrity: jaundice, abd wound and fistula with wound manager in place, RLQ ileostomy, perc cony drain, lower back/healed wound, Rudy score 18    Labs:   12-06    140  |  102  |  27<H>  ----------------------------<  129<H>  4.7   |  26  |  2.10<H>    Ca    8.5      06 Dec 2023 05:30  Mg     2.1     12-05    TPro  8.2  /  Alb  2.9<L>  /  TBili  6.9<H>  /  DBili  4.4<H>  /  AST  98<H>  /  ALT  59<H>  /  AlkPhos  143<H>  12-06      Medications:  MEDICATIONS  (STANDING):  chlorhexidine 2% Cloths 1 Application(s) Topical <User Schedule>  cholestyramine Powder (Sugar-Free) 4 Gram(s) Oral daily  epoetin matt-epbx (RETACRIT) Injectable 52930 Unit(s) SubCutaneous every 7 days  ergocalciferol 06416 Unit(s) Oral <User Schedule>  famotidine Injectable 20 milliGRAM(s) IV Push daily  fluconAZOLE IVPB 600 milliGRAM(s) IV Intermittent every 24 hours  glucagon  Injectable 1 milliGRAM(s) IntraMuscular once  heparin   Injectable 5000 Unit(s) SubCutaneous every 8 hours  hydrALAZINE Injectable 40 milliGRAM(s) IV Push every 6 hours  levothyroxine Injectable 30 MICROGram(s) IV Push at bedtime  lipid, fat emulsion (Fish Oil and Plant Based) 20% Infusion 0.55 Gm/kG/Day (21.4 mL/Hr) IV Continuous <Continuous>  metoprolol tartrate Injectable 5 milliGRAM(s) IV Push every 6 hours  Parenteral Nutrition - Adult 1 Each TPN Continuous <Continuous>  Parenteral Nutrition - Adult 1 Each TPN Continuous <Continuous>  ursodiol Suspension 300 milliGRAM(s) Oral every 8 hours  venlafaxine XR. 150 milliGRAM(s) Oral two times a day    MEDICATIONS  (PRN):  LORazepam   Injectable 0.5 milliGRAM(s) IV Push at bedtime PRN Anxiety  ondansetron Injectable 4 milliGRAM(s) IV Push every 6 hours PRN Nausea and/or Vomiting        Daily 94.2kg [28 Nov 2023]  Daily 94.4kg [27 Nov 2023]  Daily 94.2kg [26 Nov 2023]  Daily 94.2kg [22 Nov 2023]  Daily 94.2kg [16 Nov 2023]  Daily 94.2 [15 Nov 2023]  Daily 94.2kg [13 Nov 2023]  Daily 95.8kg [09 Nov 2023]  Daily 94.2kg [07 Nov 2023]  Daily 85.2kg [03 Nov 2023]  Daily 85.2kg [31 Oct 2023]  Daily 85.2kg [24 Oct 2023]  Daily 85.2kg [20 Oct 2023]  Daily 81.9kg [18 Oct 2023]  Daily 85.2kg [16 Oct 2023]  Daily   95.2kg [12 Oct 2023]   Daily 87.7kg [11 Oct 2023]  Daily 87.4kg [09 Oct 2023]  Daily 86.4kg [07 Oct 2023]  Daily 82.5kg [06 Oct 2023]  Daily 82.6kg [4 Oct 2023]   Daily 83.5kg [03 Oct 2023]  Daily 84.5kg [2 Oct 2023]  Daily 87.2kg [1 Oct 2023]  Daily 88.3kg [29 Sep 2023]  Daily 89.9kg [28 Sep 2023]  Daily 87.7kg [24 Sep 2023]  Daily 90.4kg [21 Sep 2023]  Daily 91.4kg [20 Sep 2023]  Daily 86.7kg [18 Sep 2023]  Daily 88.1kg [16 Sep 2023]  Daily 88.9kg [15 Sep 2023]   Daily 89.1 [14 Sep 2023]  Daily 92.9kg [6 Sep 2023]  Daily 91.8kg [27 Aug 2023]  Daily 101kg [9 Aug 2023]       Weight Change: weight trending down throughout admission, now appears to be trending back up. Recommend continue weekly/daily weights for trending & ensuring adequacy of nutrition provision    IBW: 86.4kg  % IBW: 109.0%    Estimated energy needs:   Ideal body weight (86.4kg) used for calculations as pt >100% IBW and BMI <30 per [facility] Standards of Care. Needs estimated for age and adjusted for current clinical status, increased needs for post-op & open abd wound healing    Calories: 5115-7949 kcals based on 30-40 kcals/kg  Protein: 129.6-172.8 g protein based on 1.5-2.0g protein/kg + additional depending on outputs  *Fluid needs per team    Subjective:   78 M w/ Crohn's, AFib/Flutter s/p DCCVs on amiodarone, remote ileocectomy and open appendectomy. Admitted (6/23) for SBO vs Crohns flare, s/p NGT decompression and s/p lap TRE converted to open TRE, SBR x 3, left in discontinuity with abthera vac on (6/27), RTOR for ileocolic resection, small bowel anastomosis, and abdominal wall closure on (6/28), c/b fluid collection s/p IR aspiration of perihepatic fluid on (7/3), c/b wound dehiscence s/p RTOR exlap, washout, ileocolic resection with end ileostomy, blow hole colostomy, red rubber from ileostomy to small bowel anastomosis; vicryl bridging mesh on (7/5) transferred to SICU postoperatively for hemodynamic monitoring, with hospital course complicated by periods of AMS most recently on 9/1 and associated with oliguria, severe ELVI and hyponatremia, which resolved but stepped back up for to SICU on 9/10 for acute AMS, intermittent hypoglycemia, AFib with RVR. Percutaneous Cholecystostomy placed on 9/11 for enlarged GB, PCT capped 10/5.    Chart reviewed. 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 PICC replaced on 12/4 and continue TPN with SMOF lipids MWF. 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 Continues with high output EC fistula on Octreotide & Cholestyramine since 10/18. Likely with inadequate nutrition due to losses, requiring parenteral nutrition support at this time. Also with noted transaminitis. RDN will continue to monitor, reassess, and intervene as appropriate.     Previous Nutrition Diagnosis: Increased Nutrient Needs R/T physiological demands for nutrient AEB post-op wound healing    Active [ X  ]  Resolved [   ]    If resolved, new PES:     Goal:  Resume TPN as soon as medically feasible    Recommendations:  1. Continue TPN as soon as medically feasible  - Recommend goal- 361g Dex, 144g AA daily over 18hrs; 50g SMOF lipids ***lipids only 3x/week- MWF schedule***  - to provide daily average of 2017.7 kcals daily, 144g protein, GIR 2.66 mg/kg/min [23.4 kcals/kg, 16.7 non-protein kcals/kg, 1.67g protein/kg IBW]  - consider increasing copper and zinc [low per latest labs]  - recommend cycling TPN over 12-16hrs as feasible  - monitor weights & wound healing, adjust substrates as indicated  2. Continue Vit D supplementation  - recommend to recheck levels 12/28  4. PO diet per team discretion  - recommend Low fat PO diet + 1 LPS BID [200 kcals, 30g protein] + 1 Ensure Max daily [150 kcals, 30g protein] -- d/c low fiber restriction  - consider NPO for bowel rest as indicated   - ongoing diet education prn  5. Hydration per team  - risk for dehydration with high outputs, monitor  - additional fluids prn  6. Monitor renal function  7. Fluids & lytes per team  - consider oral rehydration solution prn  8. Weekly lipid panel while on TPN  - hold/decrease lipids if TG >400  9. Monitor BMP/Mg/Phos, POC BG while on TPN  - monitor & replenish lytes outside TPN bag prn  10. Continue close monitoring of clinical course & adjust recommendations prn    Education: ongoing diet education for PO recs    Risk Level: High [ X  ] Moderate [   ] Low [   ].

## 2023-12-06 NOTE — PROGRESS NOTE ADULT - ASSESSMENT
78M with PMH of Crohn's, AFib/Flutter (s/p DCCVs, previously on amiodarone but now off of it for a few months), remote ileocectomy and open appendectomy (nearly 40 years ago). Patient was admitted this admission (06/23) for SBO vs. Crohn's flare, s/p NGT decompression and s/p lap TRE converted to open TRE, SBR x 3, left in discontinuity with abthera vac on (6/27), RTOR for ileocolic resection, small bowel anastomosis, and abdominal wall closure on (6/28), c/b fluid collection s/p IR aspiration of perihepatic fluid on (7/3), c/b wound dehiscence s/p RTOR exlap, washout, ileocolic resection with end ileostomy, blow hole colostomy, red rubber from ileostomy to small bowel anastomosis; vicryl bridging mesh on (7/5) transferred to SICU postoperatively for hemodynamic monitoring. Hospital course complicated by periods of AMS (most recently on 9/1 and associated with oliguria, severe ELVI and hyponatremia), which resolved. Stepped back up for to SICU on 9/10 for acute AMS, intermittent hypoglycemia, AFib with RVR. Percutaneous cholecystostomy placed on 9/11 for enlarged GB, PCT capped on 10/5. 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 Patient has been followed by Dr. Yuan from GI in the hospital. Hepatology team consulted for elevated liver enzymes and elevated bilirubin.    Chart reviewed and patient's liver enzymes began increasing mildly around early to mid September, and have been gradually increasing ever since then fairly proportionally.   - 09/05/23: AST/AT 44/31  - 09/18/23: AST/ALT 71/46  - 10/15/23: AST/ALT 80/90   - 11/01/23: AST//85    Difficult to pinpoint the exact cause of patient's elevated liver enzymes, but most likely elevated 2/2 cholestasis of illness vs. DILI (has received amiodarone, ceftriaxone, hydralazine during this admission) vs. 2/2 TPN vs. 2/2 possible irritation of the liver by perc cony drain catheter. MRCP 10/30 and CT A/P 11/1 without obstructive processes.      Recommendations:   *** DRAFT ***  - continue ursodiol 300 mg TID   - continue to trend CMP and fractionated bilirubin daily   - avoid hepatotoxic medications   - if cholestasis of illness, anticipate that liver enzymes will improve as patient improves clinically overall    Rikki ([facility]) MD Sam  Gastroenterology Fellow  Pager (M-F 7a-5p): [phone]  Pager (after hours): Please call  for on-call fellow   78M with PMH of Crohn's, AFib/Flutter (s/p DCCVs, previously on amiodarone but now off of it for a few months), remote ileocectomy and open appendectomy (nearly 40 years ago). Patient was admitted this admission (06/23) for SBO vs. Crohn's flare, s/p NGT decompression and s/p lap TRE converted to open TRE, SBR x 3, left in discontinuity with abthera vac on (6/27), RTOR for ileocolic resection, small bowel anastomosis, and abdominal wall closure on (6/28), c/b fluid collection s/p IR aspiration of perihepatic fluid on (7/3), c/b wound dehiscence s/p RTOR exlap, washout, ileocolic resection with end ileostomy, blow hole colostomy, red rubber from ileostomy to small bowel anastomosis; vicryl bridging mesh on (7/5) transferred to SICU postoperatively for hemodynamic monitoring. Hospital course complicated by periods of AMS (most recently on 9/1 and associated with oliguria, severe ELVI and hyponatremia), which resolved. Stepped back up for to SICU on 9/10 for acute AMS, intermittent hypoglycemia, AFib with RVR. Percutaneous cholecystostomy placed on 9/11 for enlarged GB, PCT capped on 10/5. 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 Patient has been followed by Dr. Yuan from GI in the hospital. Hepatology team consulted for elevated liver enzymes and elevated bilirubin.    Chart reviewed and patient's liver enzymes began increasing mildly around early to mid September, and have been gradually increasing ever since then fairly proportionally.   - 09/05/23: AST/AT 44/31  - 09/18/23: AST/ALT 71/46  - 10/15/23: AST/ALT 80/90   - 11/01/23: AST//85    Difficult to pinpoint the exact cause of patient's elevated liver enzymes, but most likely elevated 2/2 cholestasis of illness vs. DILI (has received amiodarone, ceftriaxone, hydralazine during this admission) vs. 2/2 TPN vs. 2/2 possible irritation of the liver by perc cony drain catheter. MRCP 10/30 and CT A/P 11/1 without obstructive processes.      Recommendations:   *** DRAFT ***  - continue ursodiol 300 mg TID   - continue to trend CMP and fractionated bilirubin daily   - avoid hepatotoxic medications   - if cholestasis of illness, anticipate that liver enzymes will improve as patient improves clinically overall    Rikki ([facility]) MD Sam  Gastroenterology Fellow  Pager (M-F 7a-5p): [phone]  Pager (after hours): Please call  for on-call fellow   78M with PMH of Crohn's, AFib/Flutter (s/p DCCVs, previously on amiodarone but now off of it for a few months), remote ileocectomy and open appendectomy (nearly 40 years ago). Patient was admitted this admission (06/23) for SBO vs. Crohn's flare, s/p NGT decompression and s/p lap TRE converted to open TRE, SBR x 3, left in discontinuity with abthera vac on (6/27), RTOR for ileocolic resection, small bowel anastomosis, and abdominal wall closure on (6/28), c/b fluid collection s/p IR aspiration of perihepatic fluid on (7/3), c/b wound dehiscence s/p RTOR exlap, washout, ileocolic resection with end ileostomy, blow hole colostomy, red rubber from ileostomy to small bowel anastomosis; vicryl bridging mesh on (7/5) transferred to SICU postoperatively for hemodynamic monitoring. Hospital course complicated by periods of AMS (most recently on 9/1 and associated with oliguria, severe ELVI and hyponatremia), which resolved. Stepped back up for to SICU on 9/10 for acute AMS, intermittent hypoglycemia, AFib with RVR. Percutaneous cholecystostomy placed on 9/11 for enlarged GB, PCT capped on 10/5. 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 Patient has been followed by Dr. Yuan from GI in the hospital. Hepatology team consulted for elevated liver enzymes and elevated bilirubin.    Chart reviewed and patient's liver enzymes began increasing mildly around early to mid September, and since then has fluctuated but overall stable. MRCP 10/30 and CT A/P 11/1 without obstructive processes.    Difficult to pinpoint the exact cause of patient's elevated liver enzymes, but most likely elevated 2/2 cholestasis of illness with contributions from TPN, DILI (has received amiodarone, ceftriaxone, hydralazine during this admission), and possibly irritation of the liver by perc cony drain catheter. This still appears to be the case though if felt to be clinically indicated would not be unreasonable to re-assess for biliary obstructive processes.    While a liver biopsy could potentially provide more information (or could return nonspecific), currently his elevated bilirubin does not seem to be causing him clinical problems and the potential benefits must be weighted against the risk of procedural complications especially in a pt with complex surgical history and active recent infections.    Recommendations:   - send CMV and EBV viral loads  - continue ursodiol 300 mg TID   - continue to trend CMP and fractionated bilirubin daily   - avoid hepatotoxic medications   - if cholestasis of illness, anticipate that liver enzymes will improve as patient improves clinically overall    Rikki ([facility]) MD Sam  Gastroenterology Fellow  Pager (M-F 7a-5p): [phone]  Pager (after hours): Please call  for on-call fellow   78M with PMH of Crohn's, AFib/Flutter (s/p DCCVs, previously on amiodarone but now off of it for a few months), remote ileocectomy and open appendectomy (nearly 40 years ago). Patient was admitted this admission (06/23) for SBO vs. Crohn's flare, s/p NGT decompression and s/p lap TRE converted to open TRE, SBR x 3, left in discontinuity with abthera vac on (6/27), RTOR for ileocolic resection, small bowel anastomosis, and abdominal wall closure on (6/28), c/b fluid collection s/p IR aspiration of perihepatic fluid on (7/3), c/b wound dehiscence s/p RTOR exlap, washout, ileocolic resection with end ileostomy, blow hole colostomy, red rubber from ileostomy to small bowel anastomosis; vicryl bridging mesh on (7/5) transferred to SICU postoperatively for hemodynamic monitoring. Hospital course complicated by periods of AMS (most recently on 9/1 and associated with oliguria, severe ELVI and hyponatremia), which resolved. Stepped back up for to SICU on 9/10 for acute AMS, intermittent hypoglycemia, AFib with RVR. Percutaneous cholecystostomy placed on 9/11 for enlarged GB, PCT capped on 10/5. 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 Patient has been followed by Dr. Yuan from GI in the hospital. Hepatology team consulted for elevated liver enzymes and elevated bilirubin.    Chart reviewed and patient's liver enzymes began increasing mildly around early to mid September, and since then has fluctuated but overall stable. MRCP 10/30 and CT A/P 11/1 without obstructive processes.    Difficult to pinpoint the exact cause of patient's elevated liver enzymes, but most likely elevated 2/2 cholestasis of illness with contributions from TPN, DILI (has received amiodarone, ceftriaxone, hydralazine during this admission), and possibly irritation of the liver by perc cony drain catheter. This still appears to be the case though if felt to be clinically indicated would not be unreasonable to re-assess for biliary obstructive processes.    While a liver biopsy could potentially provide more information (or could return nonspecific), currently his elevated bilirubin does not seem to be causing him clinical problems and the potential benefits must be weighted against the risk of procedural complications especially in a pt with complex surgical history and active recent infections.    Recommendations:   - send CMV and EBV viral loads  - continue ursodiol 300 mg TID   - continue to trend CMP and fractionated bilirubin daily   - avoid hepatotoxic medications   - if cholestasis of illness, anticipate that liver enzymes will improve as patient improves clinically overall    Rikki ([facility]) MD Sam  Gastroenterology Fellow  Pager (M-F 7a-5p): [phone]  Pager (after hours): Please call  for on-call fellow   78M with PMH of Crohn's, AFib/Flutter (s/p DCCVs, previously on amiodarone but now off of it for a few months), remote ileocectomy and open appendectomy (nearly 40 years ago). Patient was admitted this admission (06/23) for SBO vs. Crohn's flare, s/p NGT decompression and s/p lap TRE converted to open TRE, SBR x 3, left in discontinuity with abthera vac on (6/27), RTOR for ileocolic resection, small bowel anastomosis, and abdominal wall closure on (6/28), c/b fluid collection s/p IR aspiration of perihepatic fluid on (7/3), c/b wound dehiscence s/p RTOR exlap, washout, ileocolic resection with end ileostomy, blow hole colostomy, red rubber from ileostomy to small bowel anastomosis; vicryl bridging mesh on (7/5) transferred to SICU postoperatively for hemodynamic monitoring. Hospital course complicated by periods of AMS (most recently on 9/1 and associated with oliguria, severe ELVI and hyponatremia), which resolved. Stepped back up for to SICU on 9/10 for acute AMS, intermittent hypoglycemia, AFib with RVR. Percutaneous cholecystostomy placed on 9/11 for enlarged GB, PCT capped on 10/5. 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 Patient has been followed by Dr. Yuan from GI in the hospital. Hepatology team consulted for elevated liver enzymes and elevated bilirubin.    Chart reviewed and patient's liver enzymes began increasing mildly around early to mid September, and since then has fluctuated but overall stable. MRCP 10/30 and CT A/P 11/1 without obstructive processes.    Difficult to pinpoint the exact cause of patient's elevated liver enzymes, but most likely elevated 2/2 cholestasis of illness with contributions from TPN, DILI (has received amiodarone, ceftriaxone, hydralazine during this admission), and possibly irritation of the liver by perc ocny drain catheter. This still appears to be the case and we do not have high suspicion of new obstructive biliary process though if felt to be clinically indicated would not be unreasonable to re-assess given last imaging was >1 month ago.    While a liver biopsy could potentially provide more information (or could return nonspecific), currently his elevated bilirubin does not seem to be causing him clinical problems and the potential benefits must be weighted against the risk of procedural complications especially in a pt with complex surgical history and active recent infections.    Recommendations:   - send CMV and EBV viral loads  - continue ursodiol 300 mg TID   - continue to trend CMP and fractionated bilirubin daily   - avoid hepatotoxic medications   - if cholestasis of illness, anticipate that liver enzymes will improve as patient improves clinically overall    Rikki ([facility]) MD Sam  Gastroenterology Fellow  Pager (M-F 7a-5p): [phone]  Pager (after hours): Please call  for on-call fellow   78M with PMH of Crohn's, AFib/Flutter (s/p DCCVs, previously on amiodarone but now off of it for a few months), remote ileocectomy and open appendectomy (nearly 40 years ago). Patient was admitted this admission (06/23) for SBO vs. Crohn's flare, s/p NGT decompression and s/p lap TRE converted to open TRE, SBR x 3, left in discontinuity with abthera vac on (6/27), RTOR for ileocolic resection, small bowel anastomosis, and abdominal wall closure on (6/28), c/b fluid collection s/p IR aspiration of perihepatic fluid on (7/3), c/b wound dehiscence s/p RTOR exlap, washout, ileocolic resection with end ileostomy, blow hole colostomy, red rubber from ileostomy to small bowel anastomosis; vicryl bridging mesh on (7/5) transferred to SICU postoperatively for hemodynamic monitoring. Hospital course complicated by periods of AMS (most recently on 9/1 and associated with oliguria, severe ELVI and hyponatremia), which resolved. Stepped back up for to SICU on 9/10 for acute AMS, intermittent hypoglycemia, AFib with RVR. Percutaneous cholecystostomy placed on 9/11 for enlarged GB, PCT capped on 10/5. 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 Patient has been followed by Dr. Yuan from GI in the hospital. Hepatology team consulted for elevated liver enzymes and elevated bilirubin.    Chart reviewed and patient's liver enzymes began increasing mildly around early to mid September, and since then has fluctuated but overall stable. MRCP 10/30 and CT A/P 11/1 without obstructive processes.    Difficult to pinpoint the exact cause of patient's elevated liver enzymes, but most likely elevated 2/2 cholestasis of illness with contributions from TPN, DILI (has received amiodarone, ceftriaxone, hydralazine during this admission), and possibly irritation of the liver by perc cony drain catheter. This still appears to be the case and we do not have high suspicion of new obstructive biliary process though if felt to be clinically indicated would not be unreasonable to re-assess given last imaging was >1 month ago.    While a liver biopsy could potentially provide more information (or could return nonspecific), currently his elevated bilirubin does not seem to be causing him clinical problems and the potential benefits must be weighted against the risk of procedural complications especially in a pt with complex surgical history and active recent infections.    Recommendations:   - send CMV and EBV viral loads  - continue ursodiol 300 mg TID   - continue to trend CMP and fractionated bilirubin daily   - avoid hepatotoxic medications   - if cholestasis of illness, anticipate that liver enzymes will improve as patient improves clinically overall    Rikki ([facility]) MD Sam  Gastroenterology Fellow  Pager (M-F 7a-5p): [phone]  Pager (after hours): Please call  for on-call fellow

## 2023-12-06 NOTE — PROGRESS NOTE ADULT - SUBJECTIVE AND OBJECTIVE BOX
*** DRAFT NOTE ***    HEPATOLOGY PROGRESS NOTE    INTERVAL/SUBJECTIVE:    Allergies    penicillin (Angioedema)    Intolerances      MEDICATIONS:  MEDICATIONS  (STANDING):  chlorhexidine 2% Cloths 1 Application(s) Topical <User Schedule>  cholestyramine Powder (Sugar-Free) 4 Gram(s) Oral daily  epoetin matt-epbx (RETACRIT) Injectable 91798 Unit(s) SubCutaneous every 7 days  ergocalciferol 64001 Unit(s) Oral <User Schedule>  famotidine Injectable 20 milliGRAM(s) IV Push daily  fluconAZOLE IVPB 600 milliGRAM(s) IV Intermittent every 24 hours  glucagon  Injectable 1 milliGRAM(s) IntraMuscular once  heparin   Injectable 5000 Unit(s) SubCutaneous every 8 hours  hydrALAZINE Injectable 40 milliGRAM(s) IV Push every 6 hours  levothyroxine Injectable 30 MICROGram(s) IV Push at bedtime  metoprolol tartrate Injectable 5 milliGRAM(s) IV Push every 6 hours  Parenteral Nutrition - Adult 1 Each TPN Continuous <Continuous>  ursodiol Suspension 300 milliGRAM(s) Oral every 8 hours  venlafaxine XR. 150 milliGRAM(s) Oral two times a day    MEDICATIONS  (PRN):  LORazepam   Injectable 0.5 milliGRAM(s) IV Push at bedtime PRN Anxiety  ondansetron Injectable 4 milliGRAM(s) IV Push every 6 hours PRN Nausea and/or Vomiting    Vital Signs Last 24 Hrs  T(C): 36.6 (06 Dec 2023 05:15), Max: 36.9 (06 Dec 2023 01:04)  T(F): 97.8 (06 Dec 2023 05:15), Max: 98.4 (06 Dec 2023 01:04)  HR: 96 (06 Dec 2023 09:00) (94 - 97)  BP: 166/69 (06 Dec 2023 09:00) (125/59 - 180/85)  BP(mean): 99 (06 Dec 2023 09:00) (85 - 122)  RR: 24 (06 Dec 2023 09:00) (20 - 34)  SpO2: 96% (06 Dec 2023 09:00) (91% - 96%)    Parameters below as of 06 Dec 2023 10:00  Patient On (Oxygen Delivery Method): nasal cannula  O2 Flow (L/min): 2      12-05 @ 07:01  -  12-06 @ 07:00  --------------------------------------------------------  IN: 3492.4 mL / OUT: 2630 mL / NET: 862.4 mL    12-06 @ 07:01 - 12-06 @ 10:57  --------------------------------------------------------  IN: 333 mL / OUT: 400 mL / NET: -67 mL      PHYSICAL EXAM:  General: Alert, no acute distress  Lungs: Normal respiratory effort  Abdomen: Nondistended, soft, nontender, No rebound or guarding  Extremities: No edema  Neurological: Moving all extremities spontaneously    LABS:                        8.1    11.07 )-----------( 227      ( 05 Dec 2023 05:21 )             25.5     12-06    140  |  102  |  27<H>  ----------------------------<  129<H>  4.7   |  26  |  2.10<H>    Ca    8.5      06 Dec 2023 05:30  Mg     2.1     12-05    TPro  8.2  /  Alb  2.9<L>  /  TBili  6.9<H>  /  DBili  4.4<H>  /  AST  98<H>  /  ALT  59<H>  /  AlkPhos  143<H>  12-06          RADIOLOGY & ADDITIONAL STUDIES:  Reviewed *** DRAFT NOTE ***    HEPATOLOGY PROGRESS NOTE    INTERVAL/SUBJECTIVE:    Allergies    penicillin (Angioedema)    Intolerances      MEDICATIONS:  MEDICATIONS  (STANDING):  chlorhexidine 2% Cloths 1 Application(s) Topical <User Schedule>  cholestyramine Powder (Sugar-Free) 4 Gram(s) Oral daily  epoetin matt-epbx (RETACRIT) Injectable 88405 Unit(s) SubCutaneous every 7 days  ergocalciferol 95166 Unit(s) Oral <User Schedule>  famotidine Injectable 20 milliGRAM(s) IV Push daily  fluconAZOLE IVPB 600 milliGRAM(s) IV Intermittent every 24 hours  glucagon  Injectable 1 milliGRAM(s) IntraMuscular once  heparin   Injectable 5000 Unit(s) SubCutaneous every 8 hours  hydrALAZINE Injectable 40 milliGRAM(s) IV Push every 6 hours  levothyroxine Injectable 30 MICROGram(s) IV Push at bedtime  metoprolol tartrate Injectable 5 milliGRAM(s) IV Push every 6 hours  Parenteral Nutrition - Adult 1 Each TPN Continuous <Continuous>  ursodiol Suspension 300 milliGRAM(s) Oral every 8 hours  venlafaxine XR. 150 milliGRAM(s) Oral two times a day    MEDICATIONS  (PRN):  LORazepam   Injectable 0.5 milliGRAM(s) IV Push at bedtime PRN Anxiety  ondansetron Injectable 4 milliGRAM(s) IV Push every 6 hours PRN Nausea and/or Vomiting    Vital Signs Last 24 Hrs  T(C): 36.6 (06 Dec 2023 05:15), Max: 36.9 (06 Dec 2023 01:04)  T(F): 97.8 (06 Dec 2023 05:15), Max: 98.4 (06 Dec 2023 01:04)  HR: 96 (06 Dec 2023 09:00) (94 - 97)  BP: 166/69 (06 Dec 2023 09:00) (125/59 - 180/85)  BP(mean): 99 (06 Dec 2023 09:00) (85 - 122)  RR: 24 (06 Dec 2023 09:00) (20 - 34)  SpO2: 96% (06 Dec 2023 09:00) (91% - 96%)    Parameters below as of 06 Dec 2023 10:00  Patient On (Oxygen Delivery Method): nasal cannula  O2 Flow (L/min): 2      12-05 @ 07:01  -  12-06 @ 07:00  --------------------------------------------------------  IN: 3492.4 mL / OUT: 2630 mL / NET: 862.4 mL    12-06 @ 07:01 - 12-06 @ 10:57  --------------------------------------------------------  IN: 333 mL / OUT: 400 mL / NET: -67 mL      PHYSICAL EXAM:  General: Alert, no acute distress  Lungs: Normal respiratory effort  Abdomen: Nondistended, soft, nontender, No rebound or guarding  Extremities: No edema  Neurological: Moving all extremities spontaneously    LABS:                        8.1    11.07 )-----------( 227      ( 05 Dec 2023 05:21 )             25.5     12-06    140  |  102  |  27<H>  ----------------------------<  129<H>  4.7   |  26  |  2.10<H>    Ca    8.5      06 Dec 2023 05:30  Mg     2.1     12-05    TPro  8.2  /  Alb  2.9<L>  /  TBili  6.9<H>  /  DBili  4.4<H>  /  AST  98<H>  /  ALT  59<H>  /  AlkPhos  143<H>  12-06          RADIOLOGY & ADDITIONAL STUDIES:  Reviewed *** DRAFT NOTE ***    HEPATOLOGY PROGRESS NOTE    INTERVAL/SUBJECTIVE:  - MRCP 10/30 without biliary ductal dilation  - Bile cx from 11/23 and 12/1 with Pseudomonas   - Blood cx from 11/24 and 11/26 with Candida glabrata and Lactobacillus, on caspofungin 11/24-12/1 then fluconazole 12/2-now  - Received TPN until 11/23, then again 12/4-now    Allergies    penicillin (Angioedema)    Intolerances      MEDICATIONS:  MEDICATIONS  (STANDING):  chlorhexidine 2% Cloths 1 Application(s) Topical <User Schedule>  cholestyramine Powder (Sugar-Free) 4 Gram(s) Oral daily  epoetin matt-epbx (RETACRIT) Injectable 99730 Unit(s) SubCutaneous every 7 days  ergocalciferol 17593 Unit(s) Oral <User Schedule>  famotidine Injectable 20 milliGRAM(s) IV Push daily  fluconAZOLE IVPB 600 milliGRAM(s) IV Intermittent every 24 hours  glucagon  Injectable 1 milliGRAM(s) IntraMuscular once  heparin   Injectable 5000 Unit(s) SubCutaneous every 8 hours  hydrALAZINE Injectable 40 milliGRAM(s) IV Push every 6 hours  levothyroxine Injectable 30 MICROGram(s) IV Push at bedtime  metoprolol tartrate Injectable 5 milliGRAM(s) IV Push every 6 hours  Parenteral Nutrition - Adult 1 Each TPN Continuous <Continuous>  ursodiol Suspension 300 milliGRAM(s) Oral every 8 hours  venlafaxine XR. 150 milliGRAM(s) Oral two times a day    MEDICATIONS  (PRN):  LORazepam   Injectable 0.5 milliGRAM(s) IV Push at bedtime PRN Anxiety  ondansetron Injectable 4 milliGRAM(s) IV Push every 6 hours PRN Nausea and/or Vomiting    Vital Signs Last 24 Hrs  T(C): 36.6 (06 Dec 2023 05:15), Max: 36.9 (06 Dec 2023 01:04)  T(F): 97.8 (06 Dec 2023 05:15), Max: 98.4 (06 Dec 2023 01:04)  HR: 96 (06 Dec 2023 09:00) (94 - 97)  BP: 166/69 (06 Dec 2023 09:00) (125/59 - 180/85)  BP(mean): 99 (06 Dec 2023 09:00) (85 - 122)  RR: 24 (06 Dec 2023 09:00) (20 - 34)  SpO2: 96% (06 Dec 2023 09:00) (91% - 96%)    Parameters below as of 06 Dec 2023 10:00  Patient On (Oxygen Delivery Method): nasal cannula  O2 Flow (L/min): 2      12-05 @ 07:01  -  12-06 @ 07:00  --------------------------------------------------------  IN: 3492.4 mL / OUT: 2630 mL / NET: 862.4 mL    12-06 @ 07:01  -  12-06 @ 10:57  --------------------------------------------------------  IN: 333 mL / OUT: 400 mL / NET: -67 mL      PHYSICAL EXAM:  General: Alert, no acute distress  Lungs: Normal respiratory effort  Abdomen: Nondistended, soft, nontender, No rebound or guarding  Extremities: No edema  Neurological: Moving all extremities spontaneously    LABS:                        8.1    11.07 )-----------( 227      ( 05 Dec 2023 05:21 )             25.5     12-06    140  |  102  |  27<H>  ----------------------------<  129<H>  4.7   |  26  |  2.10<H>    Ca    8.5      06 Dec 2023 05:30  Mg     2.1     12-05    TPro  8.2  /  Alb  2.9<L>  /  TBili  6.9<H>  /  DBili  4.4<H>  /  AST  98<H>  /  ALT  59<H>  /  AlkPhos  143<H>  12-06          RADIOLOGY & ADDITIONAL STUDIES:  Reviewed *** DRAFT NOTE ***    HEPATOLOGY PROGRESS NOTE    INTERVAL/SUBJECTIVE:  - MRCP 10/30 without biliary ductal dilation  - Bile cx from 11/23 and 12/1 with Pseudomonas   - Blood cx from 11/24 and 11/26 with Candida glabrata and Lactobacillus, on caspofungin 11/24-12/1 then fluconazole 12/2-now  - Received TPN until 11/23, then again 12/4-now    Allergies    penicillin (Angioedema)    Intolerances      MEDICATIONS:  MEDICATIONS  (STANDING):  chlorhexidine 2% Cloths 1 Application(s) Topical <User Schedule>  cholestyramine Powder (Sugar-Free) 4 Gram(s) Oral daily  epoetin matt-epbx (RETACRIT) Injectable 13778 Unit(s) SubCutaneous every 7 days  ergocalciferol 44395 Unit(s) Oral <User Schedule>  famotidine Injectable 20 milliGRAM(s) IV Push daily  fluconAZOLE IVPB 600 milliGRAM(s) IV Intermittent every 24 hours  glucagon  Injectable 1 milliGRAM(s) IntraMuscular once  heparin   Injectable 5000 Unit(s) SubCutaneous every 8 hours  hydrALAZINE Injectable 40 milliGRAM(s) IV Push every 6 hours  levothyroxine Injectable 30 MICROGram(s) IV Push at bedtime  metoprolol tartrate Injectable 5 milliGRAM(s) IV Push every 6 hours  Parenteral Nutrition - Adult 1 Each TPN Continuous <Continuous>  ursodiol Suspension 300 milliGRAM(s) Oral every 8 hours  venlafaxine XR. 150 milliGRAM(s) Oral two times a day    MEDICATIONS  (PRN):  LORazepam   Injectable 0.5 milliGRAM(s) IV Push at bedtime PRN Anxiety  ondansetron Injectable 4 milliGRAM(s) IV Push every 6 hours PRN Nausea and/or Vomiting    Vital Signs Last 24 Hrs  T(C): 36.6 (06 Dec 2023 05:15), Max: 36.9 (06 Dec 2023 01:04)  T(F): 97.8 (06 Dec 2023 05:15), Max: 98.4 (06 Dec 2023 01:04)  HR: 96 (06 Dec 2023 09:00) (94 - 97)  BP: 166/69 (06 Dec 2023 09:00) (125/59 - 180/85)  BP(mean): 99 (06 Dec 2023 09:00) (85 - 122)  RR: 24 (06 Dec 2023 09:00) (20 - 34)  SpO2: 96% (06 Dec 2023 09:00) (91% - 96%)    Parameters below as of 06 Dec 2023 10:00  Patient On (Oxygen Delivery Method): nasal cannula  O2 Flow (L/min): 2      12-05 @ 07:01  -  12-06 @ 07:00  --------------------------------------------------------  IN: 3492.4 mL / OUT: 2630 mL / NET: 862.4 mL    12-06 @ 07:01  -  12-06 @ 10:57  --------------------------------------------------------  IN: 333 mL / OUT: 400 mL / NET: -67 mL      PHYSICAL EXAM:  General: Alert, no acute distress  Lungs: Normal respiratory effort  Abdomen: Nondistended, soft, nontender, No rebound or guarding  Extremities: No edema  Neurological: Moving all extremities spontaneously    LABS:                        8.1    11.07 )-----------( 227      ( 05 Dec 2023 05:21 )             25.5     12-06    140  |  102  |  27<H>  ----------------------------<  129<H>  4.7   |  26  |  2.10<H>    Ca    8.5      06 Dec 2023 05:30  Mg     2.1     12-05    TPro  8.2  /  Alb  2.9<L>  /  TBili  6.9<H>  /  DBili  4.4<H>  /  AST  98<H>  /  ALT  59<H>  /  AlkPhos  143<H>  12-06          RADIOLOGY & ADDITIONAL STUDIES:  Reviewed HEPATOLOGY PROGRESS NOTE    INTERVAL/SUBJECTIVE:  - MRCP 10/30 without biliary ductal dilation  - Bile cx from 11/23 and 12/1 with Pseudomonas   - Blood cx from 11/24 and 11/26 with Candida glabrata and Lactobacillus, on caspofungin 11/24-12/1 then fluconazole 12/2-now  - Received TPN until 11/23, then again 12/4-now    Pt endorsed no new complaints, but is wondering when his perc cony could be removed.    Allergies  penicillin (Angioedema)    Intolerances      MEDICATIONS:  MEDICATIONS  (STANDING):  chlorhexidine 2% Cloths 1 Application(s) Topical <User Schedule>  cholestyramine Powder (Sugar-Free) 4 Gram(s) Oral daily  epoetin matt-epbx (RETACRIT) Injectable 62089 Unit(s) SubCutaneous every 7 days  ergocalciferol 17653 Unit(s) Oral <User Schedule>  famotidine Injectable 20 milliGRAM(s) IV Push daily  fluconAZOLE IVPB 600 milliGRAM(s) IV Intermittent every 24 hours  glucagon  Injectable 1 milliGRAM(s) IntraMuscular once  heparin   Injectable 5000 Unit(s) SubCutaneous every 8 hours  hydrALAZINE Injectable 40 milliGRAM(s) IV Push every 6 hours  levothyroxine Injectable 30 MICROGram(s) IV Push at bedtime  metoprolol tartrate Injectable 5 milliGRAM(s) IV Push every 6 hours  Parenteral Nutrition - Adult 1 Each TPN Continuous <Continuous>  ursodiol Suspension 300 milliGRAM(s) Oral every 8 hours  venlafaxine XR. 150 milliGRAM(s) Oral two times a day    MEDICATIONS  (PRN):  LORazepam   Injectable 0.5 milliGRAM(s) IV Push at bedtime PRN Anxiety  ondansetron Injectable 4 milliGRAM(s) IV Push every 6 hours PRN Nausea and/or Vomiting    Vital Signs Last 24 Hrs  T(C): 36.6 (06 Dec 2023 05:15), Max: 36.9 (06 Dec 2023 01:04)  T(F): 97.8 (06 Dec 2023 05:15), Max: 98.4 (06 Dec 2023 01:04)  HR: 96 (06 Dec 2023 09:00) (94 - 97)  BP: 166/69 (06 Dec 2023 09:00) (125/59 - 180/85)  BP(mean): 99 (06 Dec 2023 09:00) (85 - 122)  RR: 24 (06 Dec 2023 09:00) (20 - 34)  SpO2: 96% (06 Dec 2023 09:00) (91% - 96%)    Parameters below as of 06 Dec 2023 10:00  Patient On (Oxygen Delivery Method): nasal cannula  O2 Flow (L/min): 2      12-05 @ 07:01  -  12-06 @ 07:00  --------------------------------------------------------  IN: 3492.4 mL / OUT: 2630 mL / NET: 862.4 mL    12-06 @ 07:01  -  12-06 @ 10:57  --------------------------------------------------------  IN: 333 mL / OUT: 400 mL / NET: -67 mL      PHYSICAL EXAM:  General: Alert, no acute distress  Lungs: Normal respiratory effort  Abdomen: Soft, nondistended, +multiple ostomies  Extremities: No edema  Neurological: Moving all extremities spontaneously    LABS:                        8.1    11.07 )-----------( 227      ( 05 Dec 2023 05:21 )             25.5     12-06    140  |  102  |  27<H>  ----------------------------<  129<H>  4.7   |  26  |  2.10<H>    Ca    8.5      06 Dec 2023 05:30  Mg     2.1     12-05    TPro  8.2  /  Alb  2.9<L>  /  TBili  6.9<H>  /  DBili  4.4<H>  /  AST  98<H>  /  ALT  59<H>  /  AlkPhos  143<H>  12-06          RADIOLOGY & ADDITIONAL STUDIES:  Reviewed HEPATOLOGY PROGRESS NOTE    INTERVAL/SUBJECTIVE:  - MRCP 10/30 without biliary ductal dilation  - Bile cx from 11/23 and 12/1 with Pseudomonas   - Blood cx from 11/24 and 11/26 with Candida glabrata and Lactobacillus, on caspofungin 11/24-12/1 then fluconazole 12/2-now  - Received TPN until 11/23, then again 12/4-now    Pt endorsed no new complaints, but is wondering when his perc cony could be removed.    Allergies  penicillin (Angioedema)    Intolerances      MEDICATIONS:  MEDICATIONS  (STANDING):  chlorhexidine 2% Cloths 1 Application(s) Topical <User Schedule>  cholestyramine Powder (Sugar-Free) 4 Gram(s) Oral daily  epoetin matt-epbx (RETACRIT) Injectable 62320 Unit(s) SubCutaneous every 7 days  ergocalciferol 60212 Unit(s) Oral <User Schedule>  famotidine Injectable 20 milliGRAM(s) IV Push daily  fluconAZOLE IVPB 600 milliGRAM(s) IV Intermittent every 24 hours  glucagon  Injectable 1 milliGRAM(s) IntraMuscular once  heparin   Injectable 5000 Unit(s) SubCutaneous every 8 hours  hydrALAZINE Injectable 40 milliGRAM(s) IV Push every 6 hours  levothyroxine Injectable 30 MICROGram(s) IV Push at bedtime  metoprolol tartrate Injectable 5 milliGRAM(s) IV Push every 6 hours  Parenteral Nutrition - Adult 1 Each TPN Continuous <Continuous>  ursodiol Suspension 300 milliGRAM(s) Oral every 8 hours  venlafaxine XR. 150 milliGRAM(s) Oral two times a day    MEDICATIONS  (PRN):  LORazepam   Injectable 0.5 milliGRAM(s) IV Push at bedtime PRN Anxiety  ondansetron Injectable 4 milliGRAM(s) IV Push every 6 hours PRN Nausea and/or Vomiting    Vital Signs Last 24 Hrs  T(C): 36.6 (06 Dec 2023 05:15), Max: 36.9 (06 Dec 2023 01:04)  T(F): 97.8 (06 Dec 2023 05:15), Max: 98.4 (06 Dec 2023 01:04)  HR: 96 (06 Dec 2023 09:00) (94 - 97)  BP: 166/69 (06 Dec 2023 09:00) (125/59 - 180/85)  BP(mean): 99 (06 Dec 2023 09:00) (85 - 122)  RR: 24 (06 Dec 2023 09:00) (20 - 34)  SpO2: 96% (06 Dec 2023 09:00) (91% - 96%)    Parameters below as of 06 Dec 2023 10:00  Patient On (Oxygen Delivery Method): nasal cannula  O2 Flow (L/min): 2      12-05 @ 07:01  -  12-06 @ 07:00  --------------------------------------------------------  IN: 3492.4 mL / OUT: 2630 mL / NET: 862.4 mL    12-06 @ 07:01  -  12-06 @ 10:57  --------------------------------------------------------  IN: 333 mL / OUT: 400 mL / NET: -67 mL      PHYSICAL EXAM:  General: Alert, no acute distress  Lungs: Normal respiratory effort  Abdomen: Soft, nondistended, +multiple ostomies  Extremities: No edema  Neurological: Moving all extremities spontaneously    LABS:                        8.1    11.07 )-----------( 227      ( 05 Dec 2023 05:21 )             25.5     12-06    140  |  102  |  27<H>  ----------------------------<  129<H>  4.7   |  26  |  2.10<H>    Ca    8.5      06 Dec 2023 05:30  Mg     2.1     12-05    TPro  8.2  /  Alb  2.9<L>  /  TBili  6.9<H>  /  DBili  4.4<H>  /  AST  98<H>  /  ALT  59<H>  /  AlkPhos  143<H>  12-06          RADIOLOGY & ADDITIONAL STUDIES:  Reviewed

## 2023-12-06 NOTE — PROGRESS NOTE ADULT - ASSESSMENT
78M w PMH Crohn's, AFib/Flutter s/p DCCVs on amiodarone, remote ileocectomy and open appendectomy. Admitted (6/23) for SBO vs Crohns flare, s/p NGT decompression and s/p lap converted to open TRE, SBR x 3, left in discontinuity with abthera vac on (6/27), RTOR for ileocolic resection, small bowel anastomosis, and abdominal wall closure on (6/28), c/b fluid collection s/p IR aspiration of perihepatic fluid on (7/3), c/b wound dehiscence s/p RTOR exlap, washout, ileocolic resection with end ileostomy, blow hole colostomy, red rubber from ileostomy to small bowel anastomosis; vicryl bridging mesh on (7/5) transferred to SICU postoperatively for hemodynamic monitoring, with hospital course complicated by periods of severe ELVI and hyponatremia, which resolved but stepped back up for to SICU on (9/10) for acute AMS, intermittent hypoglycemia, AFib with RVR. Percutaneous Cholecystostomy placed on (9/11) for enlarged GB, PCT capped 10/5 and uncapped 10/25 for hyperbilirubinemia, Right brachial DVT, left basillic and cephalic superficial thrombus on duplex 11/2, CT 11/14 with ALDEN ground glass opacity of unclear etiology, completed empiric 7day cefepime course 11/22, rising T-bili of unclear etilogy, now w fungemia (candida glabrata) CLABSI, ELVI     NEURO: AMS resolved, likely septic encephalopathy, EEG neg. Hx of depression - cont Effexor. Nausea: Zofran PRN. Anxiety: Lorazepam PRN.  CV: Hx Afib/flutter: s/p DCCV, Amio stopped due to persistent transaminitis. Continue Metoprolol 5q6 with hold parameters. Hx HLD: Lipitor held given high LFTs. TTE  (7/18) - PASP 64mmHg, EF 65-70%, Hx HTN - stopped PO Norvasc as unlikely absorbing, switch to hydralazine and Lopressor IV, holding Losartan. if persistently hypertensive despite hydralazine and lopressor, will change from lopressor to labetalol IV   PULM: CT from 11/14 with ALDEN ground glass opacity of unclear etiology. COVID negative. RVP negative. completed 7d empiric cefepime 11/22 to cover for potential PNA. shortness of breath since last night, POCUS no B lines, but has persistent bilateral pleural effusion, will diurese with lasix.   GI/FEN: Low res, low fat, high protein diet. Cont Ensure max x1/day. 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 PICC replaced on 12/4 and continue TPN qd, lipids MWF.  High output EC fistula: cont Octreotide & Cholestyramine 10/18. Transaminitis elevated T bili of unclear origin, consulted Team 1 Sgy (Ty), also talked to GI regarding EUS to eval ampulla, GI deferring decision pending further evaluation by hepatalogy team, s/p Perc Deb on 9/11; MRCP 10/30 no obstruction, seen by Hepatology started on ursodiol, RUQ US 11/25 CBD only 5mm, hepatic vessels patent  Requesting repeat Duplex w portal venous and arterial system, as well as image of ampulla. MRCP neg, cont PPI. Monitor drainage from fistula, bolus as needed to keep I&O even. Ensure once daily with LPS. Folate, thiamine added.  : Voids. ELVI, Cr peaked. s/p brief bicarb gtts.  good UOP. monitor fistula output, keep I&O even with boluses PRN.  MARLO Duplex and RP US unremarkable, CK wnl.  f/u renal recs pending glomerulonephritis labs  ENDO: Hx hypothyroid: IV Synthroid, TSH low on 11/30, decreased synthroid dose, recheck TSH 12/6  ID: Per ID, unlikely Caspo leading to ELVI, Chest CT with ALDEN with ground glass opacities, unclear etiology. bld cx from 11/22 grew candida glabrata. likely CLABSI. PICC removed 11/24.  ID consulted, s/p Caspo, switched to Fluconazole (12/1-). Repeat surveillance blood cx NGTD. ophthalmology evaulated - normal ocular exam. echo no vegetation, PICC replaced on 12/4.  off cefepime/flagyl. recent MRSA swab negative, RVP panel negative, COVID negative this past week. Cont Nystatin powder // PREVIOUSLY  caspofungin (11/24-12/1). completed empiric 7days cefepime (11/15-11/22), had C. tertium, Lactobacillis from his IR cx 7/3, and candida albicans, lactobacillus, vanc sensitive E faecium, vanc resistant E gallinarum, vanc resistant E casseliflavus, lactobacillus from his OR cx 7/5. Completed course of abx with Imipenem (9/10-9/15). Imipenem (8/26--) imipenem (6/30-7/12, 7/23-7/30), Dapto (6/30-7/5 and 7/23-7/24). Empiric dapto (8/23-24) and cefepime (8/23-24).   PPX: SCDs, SQH, was on Therapeutic lovenox bid for R brachial vein DVT, but will hold off on hep gtt since repeat US Duplex negative.  HEME: Anemia: continue Epogen weekly. S/p Iron Sucrose 200 qd x 3 days for chronic anemia.  LINES: PIVs // DC: LUE PICC (9/1-11/1 ), RUE PICC: (11/1-11/24)   WOUNDS/DRAINS: Abdominal wound and fistula with wound manager in place dressing change Tuesday and Friday. RLQ Ostomy. IR perc deb tube. // DC: L Clay drain.  PT: Ambulate as tolerated   DISPO: SICU due to complicated hospital course.

## 2023-12-06 NOTE — PROGRESS NOTE ADULT - SUBJECTIVE AND OBJECTIVE BOX
S: had sob last night, denies cp, palpitations  No new issues/events overnight, no new med c/o    O: ICU Vital Signs Last 24 Hrs  T(F): 97.8 (12-06-23 @ 05:15), Max: 98.4 (12-06-23 @ 01:04)  HR: 98 (12-06-23 @ 13:00) (94 - 98)  BP: 143/65 (12-06-23 @ 13:00) (125/59 - 180/77)  BP(mean): 94 (12-06-23 @ 13:00) (85 - 116)  ABP: --  RR: 22 (12-06-23 @ 13:00) (20 - 34)  SpO2: 91% (12-06-23 @ 13:00) (91% - 96%)    PHYSICAL EXAM:   Neurological: AAOx3, CNII-XII intact,  strength 5/5 b/l  ENT: mucus membrane moist  Cardiovascular: RRR  Respiratory: CTA  Gastrointestinal: soft, NT, ND, BS+, Per cony bilious drainage. fistula with brownish output. no active bleeding.   Extremities: warm, no dependent edema  Vascular: no cyanosis/erythema  Skin: no rashes  MSK: no joint swelling.       LABS:    12-06    140  |  102  |  27<H>  ----------------------------<  129<H>  4.7   |  26  |  2.10<H>    Ca    8.5      06 Dec 2023 05:30  Mg     2.1     12-05    TPro  8.2  /  Alb  2.9<L>  /  TBili  6.9<H>  /  DBili  4.4<H>  /  AST  98<H>  /  ALT  59<H>  /  AlkPhos  143<H>  12-06  LIVER FUNCTIONS - ( 06 Dec 2023 05:30 )  Alb: 2.9 g/dL / Pro: 8.2 g/dL / ALK PHOS: 143 U/L / ALT: 59 U/L / AST: 98 U/L / GGT: x                               8.1    11.07 )-----------( 227      ( 05 Dec 2023 05:21 )             25.5     Urinalysis Basic - ( 06 Dec 2023 05:30 )    Color: x / Appearance: x / SG: x / pH: x  Gluc: 129 mg/dL / Ketone: x  / Bili: x / Urobili: x   Blood: x / Protein: x / Nitrite: x   Leuk Esterase: x / RBC: x / WBC x   Sq Epi: x / Non Sq Epi: x / Bacteria: x    CAPILLARY BLOOD GLUCOSE        MEDICATIONS  (STANDING):  chlorhexidine 2% Cloths 1 Application(s) Topical <User Schedule>  cholestyramine Powder (Sugar-Free) 4 Gram(s) Oral daily  epoetin matt-epbx (RETACRIT) Injectable 15174 Unit(s) SubCutaneous every 7 days  ergocalciferol 09408 Unit(s) Oral <User Schedule>  famotidine Injectable 20 milliGRAM(s) IV Push daily  fluconAZOLE IVPB 600 milliGRAM(s) IV Intermittent every 24 hours  glucagon  Injectable 1 milliGRAM(s) IntraMuscular once  heparin   Injectable 5000 Unit(s) SubCutaneous every 8 hours  hydrALAZINE Injectable 40 milliGRAM(s) IV Push every 6 hours  levothyroxine Injectable 30 MICROGram(s) IV Push at bedtime  lipid, fat emulsion (Fish Oil and Plant Based) 20% Infusion 0.55 Gm/kG/Day (21.4 mL/Hr) IV Continuous <Continuous>  metoprolol tartrate Injectable 5 milliGRAM(s) IV Push every 6 hours  Parenteral Nutrition - Adult 1 Each TPN Continuous <Continuous>  Parenteral Nutrition - Adult 1 Each TPN Continuous <Continuous>  ursodiol Suspension 300 milliGRAM(s) Oral every 8 hours  venlafaxine XR. 150 milliGRAM(s) Oral two times a day    MEDICATIONS  (PRN):  LORazepam   Injectable 0.5 milliGRAM(s) IV Push at bedtime PRN Anxiety  ondansetron Injectable 4 milliGRAM(s) IV Push every 6 hours PRN Nausea and/or Vomiting      Poole:	  [ x] None	[ ] Daily Poole Order Placed	   Indication:	  [ ] Strict I and O's    [ ] Obstruction     [ ] Incontinence + Stage 3 or 4 Decubitus  Central Line:  [ ] None	   [ x]  Medication / TPN Administration     [ ] No Peripheral IV        S: had sob last night, denies cp, palpitations  No new issues/events overnight, no new med c/o    O: ICU Vital Signs Last 24 Hrs  T(F): 97.8 (12-06-23 @ 05:15), Max: 98.4 (12-06-23 @ 01:04)  HR: 98 (12-06-23 @ 13:00) (94 - 98)  BP: 143/65 (12-06-23 @ 13:00) (125/59 - 180/77)  BP(mean): 94 (12-06-23 @ 13:00) (85 - 116)  ABP: --  RR: 22 (12-06-23 @ 13:00) (20 - 34)  SpO2: 91% (12-06-23 @ 13:00) (91% - 96%)    PHYSICAL EXAM:   Neurological: AAOx3, CNII-XII intact,  strength 5/5 b/l  ENT: mucus membrane moist  Cardiovascular: RRR  Respiratory: CTA  Gastrointestinal: soft, NT, ND, BS+, Per cony bilious drainage. fistula with brownish output. no active bleeding.   Extremities: warm, no dependent edema  Vascular: no cyanosis/erythema  Skin: no rashes  MSK: no joint swelling.       LABS:    12-06    140  |  102  |  27<H>  ----------------------------<  129<H>  4.7   |  26  |  2.10<H>    Ca    8.5      06 Dec 2023 05:30  Mg     2.1     12-05    TPro  8.2  /  Alb  2.9<L>  /  TBili  6.9<H>  /  DBili  4.4<H>  /  AST  98<H>  /  ALT  59<H>  /  AlkPhos  143<H>  12-06  LIVER FUNCTIONS - ( 06 Dec 2023 05:30 )  Alb: 2.9 g/dL / Pro: 8.2 g/dL / ALK PHOS: 143 U/L / ALT: 59 U/L / AST: 98 U/L / GGT: x                               8.1    11.07 )-----------( 227      ( 05 Dec 2023 05:21 )             25.5     Urinalysis Basic - ( 06 Dec 2023 05:30 )    Color: x / Appearance: x / SG: x / pH: x  Gluc: 129 mg/dL / Ketone: x  / Bili: x / Urobili: x   Blood: x / Protein: x / Nitrite: x   Leuk Esterase: x / RBC: x / WBC x   Sq Epi: x / Non Sq Epi: x / Bacteria: x    CAPILLARY BLOOD GLUCOSE        MEDICATIONS  (STANDING):  chlorhexidine 2% Cloths 1 Application(s) Topical <User Schedule>  cholestyramine Powder (Sugar-Free) 4 Gram(s) Oral daily  epoetin matt-epbx (RETACRIT) Injectable 78060 Unit(s) SubCutaneous every 7 days  ergocalciferol 36729 Unit(s) Oral <User Schedule>  famotidine Injectable 20 milliGRAM(s) IV Push daily  fluconAZOLE IVPB 600 milliGRAM(s) IV Intermittent every 24 hours  glucagon  Injectable 1 milliGRAM(s) IntraMuscular once  heparin   Injectable 5000 Unit(s) SubCutaneous every 8 hours  hydrALAZINE Injectable 40 milliGRAM(s) IV Push every 6 hours  levothyroxine Injectable 30 MICROGram(s) IV Push at bedtime  lipid, fat emulsion (Fish Oil and Plant Based) 20% Infusion 0.55 Gm/kG/Day (21.4 mL/Hr) IV Continuous <Continuous>  metoprolol tartrate Injectable 5 milliGRAM(s) IV Push every 6 hours  Parenteral Nutrition - Adult 1 Each TPN Continuous <Continuous>  Parenteral Nutrition - Adult 1 Each TPN Continuous <Continuous>  ursodiol Suspension 300 milliGRAM(s) Oral every 8 hours  venlafaxine XR. 150 milliGRAM(s) Oral two times a day    MEDICATIONS  (PRN):  LORazepam   Injectable 0.5 milliGRAM(s) IV Push at bedtime PRN Anxiety  ondansetron Injectable 4 milliGRAM(s) IV Push every 6 hours PRN Nausea and/or Vomiting      Poole:	  [ x] None	[ ] Daily Poole Order Placed	   Indication:	  [ ] Strict I and O's    [ ] Obstruction     [ ] Incontinence + Stage 3 or 4 Decubitus  Central Line:  [ ] None	   [ x]  Medication / TPN Administration     [ ] No Peripheral IV

## 2023-12-06 NOTE — PROGRESS NOTE ADULT - NS ATTEND AMEND GEN_ALL_CORE FT
Noted some orthopnea overnight  increased lower extremity edema  POCUS with bilateral pleural effusions  pro-BNP sent and >8000  recently restarted TPN  given 40 of lasix with good response and improvement in symptoms  on fluconazole for fungemia    Decision making high complexity

## 2023-12-06 NOTE — PROVIDER CONTACT NOTE (OTHER) - DATE AND TIME:
Speech Language Therapy Discharge Summary    Reason for therapy discharge:    All goals and outcomes met, no further needs identified.    Progress towards therapy goal(s). See goals on [facility] electronic [facility] record for goal details.  Goals met    Therapy recommendation(s):    No further therapy is recommended.     Oropharyngeal swallowing skills have returned to baseline and pt is tolerating a regular texture diet and thin liquids without difficulty. Ok for whole pills. SLP dysphagia goals have been met.       06-Dec-2023 05:00

## 2023-12-06 NOTE — PROGRESS NOTE ADULT - SUBJECTIVE AND OBJECTIVE BOX
Nephrology progress note    Seen at bedside. No complaints today.     Allergies:  penicillin (Angioedema)    Hospital Medications:   MEDICATIONS  (STANDING):  chlorhexidine 2% Cloths 1 Application(s) Topical <User Schedule>  cholestyramine Powder (Sugar-Free) 4 Gram(s) Oral daily  epoetin matt-epbx (RETACRIT) Injectable 31538 Unit(s) SubCutaneous every 7 days  ergocalciferol 11311 Unit(s) Oral <User Schedule>  famotidine Injectable 20 milliGRAM(s) IV Push daily  fluconAZOLE IVPB 600 milliGRAM(s) IV Intermittent every 24 hours  glucagon  Injectable 1 milliGRAM(s) IntraMuscular once  heparin   Injectable 5000 Unit(s) SubCutaneous every 8 hours  hydrALAZINE Injectable 40 milliGRAM(s) IV Push every 6 hours  levothyroxine Injectable 30 MICROGram(s) IV Push at bedtime  lipid, fat emulsion (Fish Oil and Plant Based) 20% Infusion 0.55 Gm/kG/Day (21.4 mL/Hr) IV Continuous <Continuous>  metoprolol tartrate Injectable 5 milliGRAM(s) IV Push every 6 hours  Parenteral Nutrition - Adult 1 Each TPN Continuous <Continuous>  Parenteral Nutrition - Adult 1 Each TPN Continuous <Continuous>  ursodiol Suspension 300 milliGRAM(s) Oral every 8 hours  venlafaxine XR. 150 milliGRAM(s) Oral two times a day    REVIEW OF SYSTEMS:  All other review of systems is negative unless indicated above.    VITALS:  T(F): 97.8 (12-06-23 @ 05:15), Max: 98.4 (12-06-23 @ 01:04)  HR: 99 (12-06-23 @ 15:00)  BP: 117/61 (12-06-23 @ 15:00)  RR: 27 (12-06-23 @ 15:00)  SpO2: 91% (12-06-23 @ 15:00)  Wt(kg): --    12-04 @ 07:01  -  12-05 @ 07:00  --------------------------------------------------------  IN: 2862.1 mL / OUT: 3505 mL / NET: -642.9 mL    12-05 @ 07:01  -  12-06 @ 07:00  --------------------------------------------------------  IN: 3492.4 mL / OUT: 2630 mL / NET: 862.4 mL    12-06 @ 07:01  -  12-06 @ 15:20  --------------------------------------------------------  IN: 744 mL / OUT: 1845 mL / NET: -1101 mL        PHYSICAL EXAM:  Constitutional: NAD, lying in bed  HEENT: NCAT, EOMI  Respiratory: CTAB, no crackles  Cardiovascular: regular rate  Gastrointestinal: abdominal wound noted with conrado pouch, PCT noted  Extremities: 2+ LE edema - possibly improved  Neurological: Awake, alert, moving all extremities    LABS:  12-06    140  |  102  |  27<H>  ----------------------------<  129<H>  4.7   |  26  |  2.10<H>    Ca    8.5      06 Dec 2023 05:30  Mg     2.1     12-05    TPro  8.2  /  Alb  2.9<L>  /  TBili  6.9<H>  /  DBili  4.4<H>  /  AST  98<H>  /  ALT  59<H>  /  AlkPhos  143<H>  12-06                          8.1    11.07 )-----------( 227      ( 05 Dec 2023 05:21 )             25.5       Urine Studies:  Creatinine Trend: 2.10<--, 2.18<--, 2.34<--, 2.59<--, 2.68<--, 2.72<--  Urinalysis Basic - ( 06 Dec 2023 05:30 )    Color:  / Appearance:  / SG:  / pH:   Gluc: 129 mg/dL / Ketone:   / Bili:  / Urobili:    Blood:  / Protein:  / Nitrite:    Leuk Esterase:  / RBC:  / WBC    Sq Epi:  / Non Sq Epi:  / Bacteria:       Sodium, Random Urine: 118 mmol/L (11-30 @ 05:07)  Creatinine, Random Urine: 34 mg/dL (11-30 @ 05:07)  Protein/Creatinine Ratio Calculation: 0.8 Ratio (11-30 @ 05:07)  Osmolality, Random Urine: 344 mosm/kg (11-30 @ 05:07)  Potassium, Random Urine: 8 mmol/L (11-30 @ 05:07)    RADIOLOGY & ADDITIONAL STUDIES:  Reviewed Nephrology progress note    Seen at bedside. No complaints today.     Allergies:  penicillin (Angioedema)    Hospital Medications:   MEDICATIONS  (STANDING):  chlorhexidine 2% Cloths 1 Application(s) Topical <User Schedule>  cholestyramine Powder (Sugar-Free) 4 Gram(s) Oral daily  epoetin matt-epbx (RETACRIT) Injectable 93778 Unit(s) SubCutaneous every 7 days  ergocalciferol 44732 Unit(s) Oral <User Schedule>  famotidine Injectable 20 milliGRAM(s) IV Push daily  fluconAZOLE IVPB 600 milliGRAM(s) IV Intermittent every 24 hours  glucagon  Injectable 1 milliGRAM(s) IntraMuscular once  heparin   Injectable 5000 Unit(s) SubCutaneous every 8 hours  hydrALAZINE Injectable 40 milliGRAM(s) IV Push every 6 hours  levothyroxine Injectable 30 MICROGram(s) IV Push at bedtime  lipid, fat emulsion (Fish Oil and Plant Based) 20% Infusion 0.55 Gm/kG/Day (21.4 mL/Hr) IV Continuous <Continuous>  metoprolol tartrate Injectable 5 milliGRAM(s) IV Push every 6 hours  Parenteral Nutrition - Adult 1 Each TPN Continuous <Continuous>  Parenteral Nutrition - Adult 1 Each TPN Continuous <Continuous>  ursodiol Suspension 300 milliGRAM(s) Oral every 8 hours  venlafaxine XR. 150 milliGRAM(s) Oral two times a day    REVIEW OF SYSTEMS:  All other review of systems is negative unless indicated above.    VITALS:  T(F): 97.8 (12-06-23 @ 05:15), Max: 98.4 (12-06-23 @ 01:04)  HR: 99 (12-06-23 @ 15:00)  BP: 117/61 (12-06-23 @ 15:00)  RR: 27 (12-06-23 @ 15:00)  SpO2: 91% (12-06-23 @ 15:00)  Wt(kg): --    12-04 @ 07:01  -  12-05 @ 07:00  --------------------------------------------------------  IN: 2862.1 mL / OUT: 3505 mL / NET: -642.9 mL    12-05 @ 07:01  -  12-06 @ 07:00  --------------------------------------------------------  IN: 3492.4 mL / OUT: 2630 mL / NET: 862.4 mL    12-06 @ 07:01  -  12-06 @ 15:20  --------------------------------------------------------  IN: 744 mL / OUT: 1845 mL / NET: -1101 mL        PHYSICAL EXAM:  Constitutional: NAD, lying in bed  HEENT: NCAT, EOMI  Respiratory: CTAB, no crackles  Cardiovascular: regular rate  Gastrointestinal: abdominal wound noted with conrado pouch, PCT noted  Extremities: 2+ LE edema - possibly improved  Neurological: Awake, alert, moving all extremities    LABS:  12-06    140  |  102  |  27<H>  ----------------------------<  129<H>  4.7   |  26  |  2.10<H>    Ca    8.5      06 Dec 2023 05:30  Mg     2.1     12-05    TPro  8.2  /  Alb  2.9<L>  /  TBili  6.9<H>  /  DBili  4.4<H>  /  AST  98<H>  /  ALT  59<H>  /  AlkPhos  143<H>  12-06                          8.1    11.07 )-----------( 227      ( 05 Dec 2023 05:21 )             25.5       Urine Studies:  Creatinine Trend: 2.10<--, 2.18<--, 2.34<--, 2.59<--, 2.68<--, 2.72<--  Urinalysis Basic - ( 06 Dec 2023 05:30 )    Color:  / Appearance:  / SG:  / pH:   Gluc: 129 mg/dL / Ketone:   / Bili:  / Urobili:    Blood:  / Protein:  / Nitrite:    Leuk Esterase:  / RBC:  / WBC    Sq Epi:  / Non Sq Epi:  / Bacteria:       Sodium, Random Urine: 118 mmol/L (11-30 @ 05:07)  Creatinine, Random Urine: 34 mg/dL (11-30 @ 05:07)  Protein/Creatinine Ratio Calculation: 0.8 Ratio (11-30 @ 05:07)  Osmolality, Random Urine: 344 mosm/kg (11-30 @ 05:07)  Potassium, Random Urine: 8 mmol/L (11-30 @ 05:07)    RADIOLOGY & ADDITIONAL STUDIES:  Reviewed

## 2023-12-06 NOTE — PROCEDURE NOTE - NSUS ED ADDITIONAL DETAIL1 FT
see images uploaded to [facility]  [email or web address] see images uploaded to [facility]  [email or web address]

## 2023-12-07 NOTE — PROVIDER CONTACT NOTE (OTHER) - ASSESSMENT
HR 71, /68 (98), denies chest pain/SOB
PT A&Ox4, RA, denies pain, denies SOB, asymptomatic
Patient can answer all orientation questions correctly at this time
Pt denies dizziness/lightheadedness, CP, SOB, N/V. HR 76 /82 RR 19 O2Sat 95% on RA
Tele called unit to alert RN that patient converted to Afib. Vitals stable, HR 84, /59, satting 93% on RA. Pt has not yet converted back to junctional rhythm
aox4, nsr, vss. drainage bag is still intact, no leakage since drainage is dependent.
Cannister level 100, thick tan drainage noted. Dressing CDI.
Dr. Salinas at bedside, temp taken 102.8 rectal, probe placed 104.9 -- see VS flowsheet for VS [id number]
HR 70, /89 (112), denies chest pain/SOB
HR 84, /118 (141), denies chest pain/SOB
JOYCE is off LIS, while on suction, abdominal wound vac loses seal therefore JOYCE placed on self suction
Jeet PEREZ is A&Ox4 and was able to answer all questions clearly without confusion.
Pt asymptomatic, denies any chest pain, /79 HR 58 RR 19 SPO2 91
VSS PT is showing signs of AMS. He is able to answer all questions but the conversation may sway to an unrelated topic or comment. Pt has no c/o pain, dizziness, or feelings of confusion at this time.
, converted to afib, /98 (123), RR 35, 90% on RA, temp 100.3 rectal
Patient is able to answer all orientation questions, however not redirectable to reality at this time.
Patient no longer c/o pain but wound vac is indicating a leak. RN attempted to reset machine and check dressing but was unable to find the source of the leak.  Team paged at 0645 Les stated she would inform the team before they conducted rounds. Team made aware during rounds while in patient room. Team then paged again at 0724 and Jamel was advised that the alarm is indicating a leak
Pt asymptomatic. /86 (123) HR 62.
Pt seen from the window into his room trying to get over the side rail of his bed. Pt is talking and appears to be having a conversation with someone, motioning with his hands. No one in room with him. RN asked pt if he was feeling okay, he responded "yeah." RN attempted to orient patient, to which patient responded "can you just give us some privacy?", pointing at the wall. RN reoriented patient while checking vitals. HR 84, /64. Satting 93% on RA. RN paged team to assess patient
200 cc sanguineous drainage via conrado pouch observed, surgical PA Addy Chadwick notified and at bedside assessing patient
BP above parameter to 200/80, PT asymptomatic, denies chest pain, denies SOB,
PT has drainage coming from around Dustin and Wound Vac drains. Yellow drainage on towel covering drains.
Pt has not yet voided overnight. Pt has urinal at bedside. RN asked pt to attempt to void with urinal, pt unable to void. 2 RN's unable to locate patient's bladder on bladder scanner. Pt states he does not feel the urge to void, does not feel pressure in the suprapubic region.
RN assessed and does not see blockage/leak.
No kinks in tube, all clamps are open, no obvious blockage on assessment seen. All attachments are in proper place. Pt has no complaints.
120mL of drainage from eakins pouch. Pt VSS, stoma red, no acute bleeding on inspection, Pt denies abdominal pain, no nausea nor vomiting.
post v.s after hydralazine and lopressor administered pt HR 78 /77 RR 17 SPO2 99 on RA.
PT denies cp, palpitations or dizziness. VSS.

## 2023-12-07 NOTE — PROGRESS NOTE ADULT - ASSESSMENT
77 yo M with prolonged hospital course, admited for SBO and CD flare s/p multiple GI surgeries with wound dehiscense, ileostomy EC fistula, PCT placement with worsening LFTs, recurrent ELVI in the setting of candidemia. Nephrology reconsulted for ELVI.    Imp: ELVI, suspect hemodynamic/septic ATN. Ddx includes GN with recurrent hematuria and paraprotein-related renal disease.    Plan:  - SCr peaked at 2.9, dowtrending but now 2.39; Baseline ~0.9. Perhaps rate of improvement tapering off slightly.  - Mild proteinuria 0.8 UPCR 12/01  - No hydronephrosis on US 12/01  - Urine with bence-stone protein, FLCR 1  - p-ANCA, ELEUTERIO pos  - Cryoglobulins negative  - Would not pursue renal biopsy at this time with renal recovery, also patient states he would not be amenable to biopsy. Will continue to follow.  - Currently on Fluconazole for candidemia.  - On parenteral nutrition  - Maintain slightly net negative fluid status, 1-2L/day as tolerated  - Daily BMP  - Stop hydralazine  - Maintain MAP >65 for renal perfusion  - Strict I&O, daily weights 79 yo M with prolonged hospital course, admited for SBO and CD flare s/p multiple GI surgeries with wound dehiscense, ileostomy EC fistula, PCT placement with worsening LFTs, recurrent ELVI in the setting of candidemia. Nephrology reconsulted for ELVI.    Imp: ELVI, suspect hemodynamic/septic ATN. Ddx includes GN with recurrent hematuria and paraprotein-related renal disease.    Plan:  - SCr peaked at 2.9, dowtrending but now 2.39; Baseline ~0.9. Perhaps rate of improvement tapering off slightly.  - Mild proteinuria 0.8 UPCR 12/01  - No hydronephrosis on US 12/01  - Urine with bence-stone protein, FLCR 1  - p-ANCA, ELEUTERIO pos  - Cryoglobulins negative  - Would not pursue renal biopsy at this time with renal recovery, also patient states he would not be amenable to biopsy. Will continue to follow.  - Currently on Fluconazole for candidemia.  - On parenteral nutrition  - Maintain slightly net negative fluid status, 1-2L/day as tolerated  - Daily BMP  - Stop hydralazine  - Maintain MAP >65 for renal perfusion  - Strict I&O, daily weights 77 yo M with prolonged hospital course, admited for SBO and CD flare s/p multiple GI surgeries with wound dehiscense, ileostomy EC fistula, PCT placement with worsening LFTs, recurrent ELVI in the setting of candidemia. Nephrology reconsulted for ELVI.    Imp: ELVI, suspect hemodynamic/septic ATN. Ddx includes GN with recurrent hematuria and paraprotein-related renal disease.    Plan:  - SCr peaked at 2.9, dowtrending but now 2.39; Baseline ~0.9. Perhaps rate of improvement tapering off slightly.  - Mild proteinuria 0.8 UPCR 12/01  - No hydronephrosis on US 12/01  - Urine with bence-stone protein, FLCR 1  - p-ANCA, ELEUTERIO pos  - Cryoglobulins negative  - Would not pursue renal biopsy at this time with renal recovery, also patient states he would not be amenable to biopsy. Will continue to follow.  - Currently on Fluconazole for candidemia.  - On parenteral nutrition  - Maintain slightly net even fluid status  - Daily BMP  - Stop hydralazine or decrease dosage acording to BP readings  - Maintain MAP >65 for renal perfusion  - Strict I&O, daily weights

## 2023-12-07 NOTE — ADVANCED PRACTICE NURSE CONSULT - ASSESSMENT
Wound manager had begun to leak so new Coloplast Dustin's wound/fistula manager applied over fistula site. Entire healed wound bed surrounding stomatized fistula protected with stoma rings and sealed with stoma paste then new wound manager applied. Effluent more solid than liquid at this time.

## 2023-12-07 NOTE — PROGRESS NOTE ADULT - ATTENDING COMMENTS
creat up a bit after diuresis and BP realtively low for pt  suggest hold hydralazine for now and cautiously give 500 cc NS  will follow   may still consider kidney bx if fxn does not continue to improve

## 2023-12-07 NOTE — PROGRESS NOTE ADULT - ASSESSMENT
78M w PMH Crohn's, AFib/Flutter s/p DCCVs on amiodarone, remote ileocectomy and open appendectomy. Admitted (6/23) for SBO vs Crohns flare, s/p NGT decompression and s/p lap converted to open TRE, SBR x 3, left in discontinuity with abthera vac on (6/27), RTOR for ileocolic resection, small bowel anastomosis, and abdominal wall closure on (6/28), c/b fluid collection s/p IR aspiration of perihepatic fluid on (7/3), c/b wound dehiscence s/p RTOR exlap, washout, ileocolic resection with end ileostomy, blow hole colostomy, red rubber from ileostomy to small bowel anastomosis; vicryl bridging mesh on (7/5) transferred to SICU postoperatively for hemodynamic monitoring, with hospital course complicated by periods of severe ELVI and hyponatremia, which resolved but stepped back up for to SICU on (9/10) for acute AMS, intermittent hypoglycemia, AFib with RVR. Percutaneous Cholecystostomy placed on (9/11) for enlarged GB, PCT capped 10/5 and uncapped 10/25 for hyperbilirubinemia, Right brachial DVT, left basillic and cephalic superficial thrombus on duplex 11/2, CT 11/14 with ALDEN ground glass opacity of unclear etiology, completed empiric 7day cefepime course 11/22, rising T-bili of unclear etilogy, now w fungemia (candida glabrata) CLABSI, ELVI     NEURO: AMS resolved, likely septic encephalopathy, EEG neg. Hx of depression - cont Effexor. Nausea: Zofran PRN. Anxiety: Lorazepam PRN.  CV: Hx Afib/flutter: s/p DCCV, Amio stopped due to persistent transaminitis. Continue Metoprolol 5q6 with hold parameters. Hx HLD: Lipitor held given high LFTs. TTE  (7/18) - PASP 64mmHg, EF 65-70%, Hx HTN - stopped PO Norvasc as unlikely absorbing, holding Losartan. BP borderline - will stop hydralazine.   PULM: CT from 11/14 with ALDEN ground glass opacity of unclear etiology. COVID negative. RVP negative. completed 7d empiric cefepime 11/22 to cover for potential PNA. dyspnea improved after lasix yesterday. but will hold off on further diuresis given bump in Cr.   GI/FEN: Low res, low fat, high protein diet. Cont Ensure max x1/day. 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 PICC replaced on 12/4 and continue TPN qd, lipids MWF.  High output EC fistula: cont Octreotide & Cholestyramine 10/18. Transaminitis elevated T bili of unclear origin, consulted Team 1 Sgy (Ty), also talked to GI regarding EUS to eval ampulla, GI deferring decision pending further evaluation by hepatalogy team, s/p Perc Deb on 9/11; MRCP 10/30 no obstruction, seen by Hepatology started on ursodiol, RUQ US 11/25 CBD only 5mm, hepatic vessels patent  Requesting repeat Duplex w portal venous and arterial system, as well as image of ampulla. MRCP neg, cont PPI. Monitor drainage from fistula, bolus as needed to keep I&O even. Ensure once daily with LPS. Folate, thiamine added.  : Voids. ELVI, Cr peaked. s/p brief bicarb gtts.  good UOP. monitor fistula output, keep I&O even with boluses PRN.  MARLO Duplex and RP US unremarkable, CK wnl.  f/u renal recs pending glomerulonephritis labs  ENDO: Hx hypothyroid: IV Synthroid, TSH low on 11/30, decreased synthroid dose, recheck TSH 12/6  ID: Per ID, unlikely Caspo leading to ELVI, Chest CT with ALDEN with ground glass opacities, unclear etiology. bld cx from 11/22 grew candida glabrata. likely CLABSI. PICC removed 11/24.  ID consulted, s/p Caspo, switched to Fluconazole (12/1-). Repeat surveillance blood cx NGTD. ophthalmology evaulated - normal ocular exam. echo no vegetation, PICC replaced on 12/4.  off cefepime/flagyl. recent MRSA swab negative, RVP panel negative, COVID negative this past week. Cont Nystatin powder // PREVIOUSLY  caspofungin (11/24-12/1). completed empiric 7days cefepime (11/15-11/22), had C. tertium, Lactobacillis from his IR cx 7/3, and candida albicans, lactobacillus, vanc sensitive E faecium, vanc resistant E gallinarum, vanc resistant E casseliflavus, lactobacillus from his OR cx 7/5. Completed course of abx with Imipenem (9/10-9/15). Imipenem (8/26--) imipenem (6/30-7/12, 7/23-7/30), Dapto (6/30-7/5 and 7/23-7/24). Empiric dapto (8/23-24) and cefepime (8/23-24).   PPX: SCDs, SQH, was on Therapeutic lovenox bid for R brachial vein DVT, but will hold off on hep gtt since repeat US Duplex negative.  HEME: Anemia: continue Epogen weekly. S/p Iron Sucrose 200 qd x 3 days for chronic anemia.  LINES: PIVs // DC: LUE PICC (9/1-11/1 ), RUE PICC: (11/1-11/24)   WOUNDS/DRAINS: Abdominal wound and fistula with wound manager in place dressing change Tuesday and Friday. RLQ Ostomy. IR perc deb tube. // DC: L Clay drain.  PT: Ambulate as tolerated   DISPO: SICU due to complicated hospital course.

## 2023-12-07 NOTE — PROGRESS NOTE ADULT - SUBJECTIVE AND OBJECTIVE BOX
S: says his breathing feel much better.   No new issues/events overnight, no new med c/o    O: ICU Vital Signs Last 24 Hrs  T(F): 98.3 (12-07-23 @ 09:00), Max: 99.2 (12-06-23 @ 17:00)  HR: 116 (12-07-23 @ 13:40) (96 - 116)  BP: 104/52 (12-07-23 @ 13:40) (94/53 - 147/73)  BP(mean): 71 (12-07-23 @ 13:40) (70 - 103)  ABP: --  RR: 22 (12-07-23 @ 13:40) (20 - 38)  SpO2: 94% (12-07-23 @ 13:40) (86% - 96%)    PHYSICAL EXAM:   Neurological: AAOx3, CNII-XII intact,  strength 5/5 b/l  ENT: mucus membrane moist  Cardiovascular: RRR  Respiratory: CTA  Gastrointestinal: soft, NT, ND, BS+, Per cony bilious drainage. fistula with brownish output. no active bleeding.   Extremities: warm, no dependent edema  Vascular: no cyanosis/erythema  Skin: no rashes  MSK: no joint swelling.    LABS:    12-07    134<L>  |  97  |  32<H>  ----------------------------<  134<H>  4.4   |  30  |  2.39<H>    Ca    8.4      07 Dec 2023 05:04  Phos  3.5     12-07  Mg     2.2     12-07    TPro  7.8  /  Alb  2.5<L>  /  TBili  6.5<H>  /  DBili  4.2<H>  /  AST  106<H>  /  ALT  62<H>  /  AlkPhos  134<H>  12-07  LIVER FUNCTIONS - ( 07 Dec 2023 05:04 )  Alb: 2.5 g/dL / Pro: 7.8 g/dL / ALK PHOS: 134 U/L / ALT: 62 U/L / AST: 106 U/L / GGT: x                               8.0    10.76 )-----------( 225      ( 07 Dec 2023 05:04 )             25.2     Urinalysis Basic - ( 07 Dec 2023 05:04 )    Color: x / Appearance: x / SG: x / pH: x  Gluc: 134 mg/dL / Ketone: x  / Bili: x / Urobili: x   Blood: x / Protein: x / Nitrite: x   Leuk Esterase: x / RBC: x / WBC x   Sq Epi: x / Non Sq Epi: x / Bacteria: x    CAPILLARY BLOOD GLUCOSE        MEDICATIONS  (STANDING):  chlorhexidine 2% Cloths 1 Application(s) Topical <User Schedule>  cholestyramine Powder (Sugar-Free) 4 Gram(s) Oral daily  epoetin matt-epbx (RETACRIT) Injectable 28384 Unit(s) SubCutaneous every 7 days  ergocalciferol 07054 Unit(s) Oral <User Schedule>  famotidine Injectable 20 milliGRAM(s) IV Push daily  fluconAZOLE IVPB 600 milliGRAM(s) IV Intermittent every 24 hours  glucagon  Injectable 1 milliGRAM(s) IntraMuscular once  heparin   Injectable 5000 Unit(s) SubCutaneous every 8 hours  levothyroxine Injectable 30 MICROGram(s) IV Push at bedtime  metoprolol tartrate Injectable 5 milliGRAM(s) IV Push every 6 hours  Parenteral Nutrition - Adult 1 Each TPN Continuous <Continuous>  Parenteral Nutrition - Adult 1 Each TPN Continuous <Continuous>  ursodiol Suspension 300 milliGRAM(s) Oral every 8 hours  venlafaxine XR. 150 milliGRAM(s) Oral two times a day    MEDICATIONS  (PRN):  LORazepam   Injectable 0.5 milliGRAM(s) IV Push at bedtime PRN Anxiety  ondansetron Injectable 4 milliGRAM(s) IV Push every 6 hours PRN Nausea and/or Vomiting      Poole:	  [x ] None	[ ] Daily Poole Order Placed	   Indication:	  [ ] Strict I and O's    [ ] Obstruction     [ ] Incontinence + Stage 3 or 4 Decubitus  Central Line:  [ ] None	   [x ]  Medication / TPN Administration     [ ] No Peripheral IV        S: says his breathing feel much better.   No new issues/events overnight, no new med c/o    O: ICU Vital Signs Last 24 Hrs  T(F): 98.3 (12-07-23 @ 09:00), Max: 99.2 (12-06-23 @ 17:00)  HR: 116 (12-07-23 @ 13:40) (96 - 116)  BP: 104/52 (12-07-23 @ 13:40) (94/53 - 147/73)  BP(mean): 71 (12-07-23 @ 13:40) (70 - 103)  ABP: --  RR: 22 (12-07-23 @ 13:40) (20 - 38)  SpO2: 94% (12-07-23 @ 13:40) (86% - 96%)    PHYSICAL EXAM:   Neurological: AAOx3, CNII-XII intact,  strength 5/5 b/l  ENT: mucus membrane moist  Cardiovascular: RRR  Respiratory: CTA  Gastrointestinal: soft, NT, ND, BS+, Per cony bilious drainage. fistula with brownish output. no active bleeding.   Extremities: warm, no dependent edema  Vascular: no cyanosis/erythema  Skin: no rashes  MSK: no joint swelling.    LABS:    12-07    134<L>  |  97  |  32<H>  ----------------------------<  134<H>  4.4   |  30  |  2.39<H>    Ca    8.4      07 Dec 2023 05:04  Phos  3.5     12-07  Mg     2.2     12-07    TPro  7.8  /  Alb  2.5<L>  /  TBili  6.5<H>  /  DBili  4.2<H>  /  AST  106<H>  /  ALT  62<H>  /  AlkPhos  134<H>  12-07  LIVER FUNCTIONS - ( 07 Dec 2023 05:04 )  Alb: 2.5 g/dL / Pro: 7.8 g/dL / ALK PHOS: 134 U/L / ALT: 62 U/L / AST: 106 U/L / GGT: x                               8.0    10.76 )-----------( 225      ( 07 Dec 2023 05:04 )             25.2     Urinalysis Basic - ( 07 Dec 2023 05:04 )    Color: x / Appearance: x / SG: x / pH: x  Gluc: 134 mg/dL / Ketone: x  / Bili: x / Urobili: x   Blood: x / Protein: x / Nitrite: x   Leuk Esterase: x / RBC: x / WBC x   Sq Epi: x / Non Sq Epi: x / Bacteria: x    CAPILLARY BLOOD GLUCOSE        MEDICATIONS  (STANDING):  chlorhexidine 2% Cloths 1 Application(s) Topical <User Schedule>  cholestyramine Powder (Sugar-Free) 4 Gram(s) Oral daily  epoetin matt-epbx (RETACRIT) Injectable 07937 Unit(s) SubCutaneous every 7 days  ergocalciferol 14596 Unit(s) Oral <User Schedule>  famotidine Injectable 20 milliGRAM(s) IV Push daily  fluconAZOLE IVPB 600 milliGRAM(s) IV Intermittent every 24 hours  glucagon  Injectable 1 milliGRAM(s) IntraMuscular once  heparin   Injectable 5000 Unit(s) SubCutaneous every 8 hours  levothyroxine Injectable 30 MICROGram(s) IV Push at bedtime  metoprolol tartrate Injectable 5 milliGRAM(s) IV Push every 6 hours  Parenteral Nutrition - Adult 1 Each TPN Continuous <Continuous>  Parenteral Nutrition - Adult 1 Each TPN Continuous <Continuous>  ursodiol Suspension 300 milliGRAM(s) Oral every 8 hours  venlafaxine XR. 150 milliGRAM(s) Oral two times a day    MEDICATIONS  (PRN):  LORazepam   Injectable 0.5 milliGRAM(s) IV Push at bedtime PRN Anxiety  ondansetron Injectable 4 milliGRAM(s) IV Push every 6 hours PRN Nausea and/or Vomiting      Poole:	  [x ] None	[ ] Daily Poole Order Placed	   Indication:	  [ ] Strict I and O's    [ ] Obstruction     [ ] Incontinence + Stage 3 or 4 Decubitus  Central Line:  [ ] None	   [x ]  Medication / TPN Administration     [ ] No Peripheral IV

## 2023-12-07 NOTE — PROGRESS NOTE ADULT - SUBJECTIVE AND OBJECTIVE BOX
Seen at bedside. No complaints today.     Allergies:  penicillin (Angioedema)    Hospital Medications:   MEDICATIONS  (STANDING):  chlorhexidine 2% Cloths 1 Application(s) Topical <User Schedule>  cholestyramine Powder (Sugar-Free) 4 Gram(s) Oral daily  epoetin matt-epbx (RETACRIT) Injectable 06172 Unit(s) SubCutaneous every 7 days  ergocalciferol 83540 Unit(s) Oral <User Schedule>  famotidine Injectable 20 milliGRAM(s) IV Push daily  fluconAZOLE IVPB 600 milliGRAM(s) IV Intermittent every 24 hours  glucagon  Injectable 1 milliGRAM(s) IntraMuscular once  heparin   Injectable 5000 Unit(s) SubCutaneous every 8 hours  hydrALAZINE Injectable 40 milliGRAM(s) IV Push every 6 hours  levothyroxine Injectable 30 MICROGram(s) IV Push at bedtime  lipid, fat emulsion (Fish Oil and Plant Based) 20% Infusion 0.55 Gm/kG/Day (21.4 mL/Hr) IV Continuous <Continuous>  metoprolol tartrate Injectable 5 milliGRAM(s) IV Push every 6 hours  Parenteral Nutrition - Adult 1 Each TPN Continuous <Continuous>  Parenteral Nutrition - Adult 1 Each TPN Continuous <Continuous>  ursodiol Suspension 300 milliGRAM(s) Oral every 8 hours  venlafaxine XR. 150 milliGRAM(s) Oral two times a day    REVIEW OF SYSTEMS:  All other review of systems is negative unless indicated above.    Vital Signs Last 24 Hrs  T(C): 36.8 (07 Dec 2023 9:00), Max: 37.3 (06 Dec 2023 17:00)  T(F): 98.3 (7 Dec 2023 9:00) Max: 99.2 (06 Dec 2023 17:00)  HR: 101 (07 Dec 2023 12:00) (96 - 107)  BP: 101/57 (07 Dec 2023 12:00) (98/55 - 166/69)  BP(mean): 74 (07 Dec 2023 12:00) (72 - 103)  RR: 29 (07 Dec 2023 12:00) (20 - 38)  SpO2: 94% (07 Dec 2023 12:00) (86% - 96%)    Parameters below as of 07 Dec 2023 14:00  Patient On (Oxygen Delivery Method): room air    12-04 @ 07:01  -  12-05 @ 07:00  --------------------------------------------------------  IN: 2862.1 mL / OUT: 3505 mL / NET: -642.9 mL    12-05 @ 07:01  -  12-06 @ 07:00  --------------------------------------------------------  IN: 3492.4 mL / OUT: 2630 mL / NET: 862.4 mL    12-06 @ 07:01  -  12-07@ 07:00  --------------------------------------------------------  IN: 2645 OUT: 4365 NET: -1719 mL    12-07 @ 07:01 - 12/07@   --------------------------------------------------------  IN: 790 mL  /  OUT: 450 mL  /  NET: 340 mL      PHYSICAL EXAM:  Constitutional: NAD, resting comfortably in bed  HEENT: NCAT, EOMI  Respiratory: CTAB, no crackles, no respiratory distress  Cardiovascular: regular rate  Gastrointestinal: abdominal wound noted with conrado pouch, PCT noted  Extremities: 1+ LE edema - possibly improved  Neurological: Awake, alert, moving all extremities    LABS:                     8.0    10.76 )-----------( 225      ( 07 Dec 2023 05:04 )             25.2     12-07  134<L>  |  97  |  32<H>  ----------------------------<  134<H>  4.4   |  30  |  2.39<H>    Ca    8.4      07 Dec 2023 05:04  Phos  3.5     12-07  Mg     2.2     12-07    TPro  7.8  /  Alb  2.5<L>  /  TBili  6.5<H>  /  DBili  4.2<H>  /  AST  106<H>  /  ALT  62<H>  /  AlkPhos  134<H>  12-07    Urinalysis Basic - ( 07 Dec 2023 05:04 )  Color: x / Appearance: x / SG: x / pH: x  Gluc: 134 mg/dL / Ketone: x  / Bili: x / Urobili: x   Blood: x / Protein: x / Nitrite: x   Leuk Esterase: x / RBC: x / WBC x   Sq Epi: x / Non Sq Epi: x / Bacteria: x    Sodium, Random Urine: 118 mmol/L (11-30 @ 05:07)  Creatinine, Random Urine: 34 mg/dL (11-30 @ 05:07)  Protein/Creatinine Ratio Calculation: 0.8 Ratio (11-30 @ 05:07)  Osmolality, Random Urine: 344 mosm/kg (11-30 @ 05:07)  Potassium, Random Urine: 8 mmol/L (11-30 @ 05:07)    RADIOLOGY & ADDITIONAL STUDIES  CHEST XR 12/06  Bilateral pleural effusions, increased. Left loop recorder.   Stable position left PICC line Stable cardiomegaly and bony structures    US Retroperitoneal 12/01  Limited sonographic visualization of the bilateral kidneys. Normal size   kidneys without hydronephrosis.  No evidence of renal artery stenosis. Seen at bedside. No complaints today.     Allergies:  penicillin (Angioedema)    Hospital Medications:   MEDICATIONS  (STANDING):  chlorhexidine 2% Cloths 1 Application(s) Topical <User Schedule>  cholestyramine Powder (Sugar-Free) 4 Gram(s) Oral daily  epoetin matt-epbx (RETACRIT) Injectable 48840 Unit(s) SubCutaneous every 7 days  ergocalciferol 92023 Unit(s) Oral <User Schedule>  famotidine Injectable 20 milliGRAM(s) IV Push daily  fluconAZOLE IVPB 600 milliGRAM(s) IV Intermittent every 24 hours  glucagon  Injectable 1 milliGRAM(s) IntraMuscular once  heparin   Injectable 5000 Unit(s) SubCutaneous every 8 hours  hydrALAZINE Injectable 40 milliGRAM(s) IV Push every 6 hours  levothyroxine Injectable 30 MICROGram(s) IV Push at bedtime  lipid, fat emulsion (Fish Oil and Plant Based) 20% Infusion 0.55 Gm/kG/Day (21.4 mL/Hr) IV Continuous <Continuous>  metoprolol tartrate Injectable 5 milliGRAM(s) IV Push every 6 hours  Parenteral Nutrition - Adult 1 Each TPN Continuous <Continuous>  Parenteral Nutrition - Adult 1 Each TPN Continuous <Continuous>  ursodiol Suspension 300 milliGRAM(s) Oral every 8 hours  venlafaxine XR. 150 milliGRAM(s) Oral two times a day    REVIEW OF SYSTEMS:  All other review of systems is negative unless indicated above.    Vital Signs Last 24 Hrs  T(C): 36.8 (07 Dec 2023 9:00), Max: 37.3 (06 Dec 2023 17:00)  T(F): 98.3 (7 Dec 2023 9:00) Max: 99.2 (06 Dec 2023 17:00)  HR: 101 (07 Dec 2023 12:00) (96 - 107)  BP: 101/57 (07 Dec 2023 12:00) (98/55 - 166/69)  BP(mean): 74 (07 Dec 2023 12:00) (72 - 103)  RR: 29 (07 Dec 2023 12:00) (20 - 38)  SpO2: 94% (07 Dec 2023 12:00) (86% - 96%)    Parameters below as of 07 Dec 2023 14:00  Patient On (Oxygen Delivery Method): room air    12-04 @ 07:01  -  12-05 @ 07:00  --------------------------------------------------------  IN: 2862.1 mL / OUT: 3505 mL / NET: -642.9 mL    12-05 @ 07:01  -  12-06 @ 07:00  --------------------------------------------------------  IN: 3492.4 mL / OUT: 2630 mL / NET: 862.4 mL    12-06 @ 07:01  -  12-07@ 07:00  --------------------------------------------------------  IN: 2645 OUT: 4365 NET: -1719 mL    12-07 @ 07:01 - 12/07@   --------------------------------------------------------  IN: 790 mL  /  OUT: 450 mL  /  NET: 340 mL      PHYSICAL EXAM:  Constitutional: NAD, resting comfortably in bed  HEENT: NCAT, EOMI  Respiratory: CTAB, no crackles, no respiratory distress  Cardiovascular: regular rate  Gastrointestinal: abdominal wound noted with conrado pouch, PCT noted  Extremities: 1+ LE edema - possibly improved  Neurological: Awake, alert, moving all extremities    LABS:                     8.0    10.76 )-----------( 225      ( 07 Dec 2023 05:04 )             25.2     12-07  134<L>  |  97  |  32<H>  ----------------------------<  134<H>  4.4   |  30  |  2.39<H>    Ca    8.4      07 Dec 2023 05:04  Phos  3.5     12-07  Mg     2.2     12-07    TPro  7.8  /  Alb  2.5<L>  /  TBili  6.5<H>  /  DBili  4.2<H>  /  AST  106<H>  /  ALT  62<H>  /  AlkPhos  134<H>  12-07    Urinalysis Basic - ( 07 Dec 2023 05:04 )  Color: x / Appearance: x / SG: x / pH: x  Gluc: 134 mg/dL / Ketone: x  / Bili: x / Urobili: x   Blood: x / Protein: x / Nitrite: x   Leuk Esterase: x / RBC: x / WBC x   Sq Epi: x / Non Sq Epi: x / Bacteria: x    Sodium, Random Urine: 118 mmol/L (11-30 @ 05:07)  Creatinine, Random Urine: 34 mg/dL (11-30 @ 05:07)  Protein/Creatinine Ratio Calculation: 0.8 Ratio (11-30 @ 05:07)  Osmolality, Random Urine: 344 mosm/kg (11-30 @ 05:07)  Potassium, Random Urine: 8 mmol/L (11-30 @ 05:07)    RADIOLOGY & ADDITIONAL STUDIES  CHEST XR 12/06  Bilateral pleural effusions, increased. Left loop recorder.   Stable position left PICC line Stable cardiomegaly and bony structures    US Retroperitoneal 12/01  Limited sonographic visualization of the bilateral kidneys. Normal size   kidneys without hydronephrosis.  No evidence of renal artery stenosis. Seen at bedside. No complaints today.   s/p lasix yesterday due to orthopnea with neg I/O =-- sx resolved     Allergies:  penicillin (Angioedema)    Hospital Medications:   MEDICATIONS  (STANDING):  chlorhexidine 2% Cloths 1 Application(s) Topical <User Schedule>  cholestyramine Powder (Sugar-Free) 4 Gram(s) Oral daily  epoetin matt-epbx (RETACRIT) Injectable 39112 Unit(s) SubCutaneous every 7 days  ergocalciferol 25039 Unit(s) Oral <User Schedule>  famotidine Injectable 20 milliGRAM(s) IV Push daily  fluconAZOLE IVPB 600 milliGRAM(s) IV Intermittent every 24 hours  glucagon  Injectable 1 milliGRAM(s) IntraMuscular once  heparin   Injectable 5000 Unit(s) SubCutaneous every 8 hours  hydrALAZINE Injectable 40 milliGRAM(s) IV Push every 6 hours  levothyroxine Injectable 30 MICROGram(s) IV Push at bedtime  lipid, fat emulsion (Fish Oil and Plant Based) 20% Infusion 0.55 Gm/kG/Day (21.4 mL/Hr) IV Continuous <Continuous>  metoprolol tartrate Injectable 5 milliGRAM(s) IV Push every 6 hours  Parenteral Nutrition - Adult 1 Each TPN Continuous <Continuous>  Parenteral Nutrition - Adult 1 Each TPN Continuous <Continuous>  ursodiol Suspension 300 milliGRAM(s) Oral every 8 hours  venlafaxine XR. 150 milliGRAM(s) Oral two times a day    REVIEW OF SYSTEMS:  All other review of systems is negative unless indicated above.    Vital Signs Last 24 Hrs  T(C): 36.8 (07 Dec 2023 9:00), Max: 37.3 (06 Dec 2023 17:00)  T(F): 98.3 (7 Dec 2023 9:00) Max: 99.2 (06 Dec 2023 17:00)  HR: 101 (07 Dec 2023 12:00) (96 - 107)  BP: 101/57 (07 Dec 2023 12:00) (98/55 - 166/69)  BP(mean): 74 (07 Dec 2023 12:00) (72 - 103)  RR: 29 (07 Dec 2023 12:00) (20 - 38)  SpO2: 94% (07 Dec 2023 12:00) (86% - 96%)    Parameters below as of 07 Dec 2023 14:00  Patient On (Oxygen Delivery Method): room air    12-04 @ 07:01  -  12-05 @ 07:00  --------------------------------------------------------  IN: 2862.1 mL / OUT: 3505 mL / NET: -642.9 mL    12-05 @ 07:01  -  12-06 @ 07:00  --------------------------------------------------------  IN: 3492.4 mL / OUT: 2630 mL / NET: 862.4 mL    12-06 @ 07:01  -  12-07@ 07:00  --------------------------------------------------------  IN: 2645 OUT: 4365 NET: -1719 mL    12-07 @ 07:01  - 12/07@   --------------------------------------------------------  IN: 790 mL  /  OUT: 450 mL  /  NET: 340 mL      PHYSICAL EXAM:  Constitutional: NAD, resting comfortably in bed  HEENT: NCAT, EOMI  Respiratory: CTAB, no crackles, no respiratory distress  Cardiovascular: regular rate  Gastrointestinal: abdominal wound noted with conrado pouch, PCT noted  Extremities: 1+ LE edema -  improved  Neurological: Awake, alert, moving all extremities    LABS:                     8.0    10.76 )-----------( 225      ( 07 Dec 2023 05:04 )             25.2     12-07  134<L>  |  97  |  32<H>  ----------------------------<  134<H>  4.4   |  30  |  2.39<H>    Ca    8.4      07 Dec 2023 05:04  Phos  3.5     12-07  Mg     2.2     12-07    TPro  7.8  /  Alb  2.5<L>  /  TBili  6.5<H>  /  DBili  4.2<H>  /  AST  106<H>  /  ALT  62<H>  /  AlkPhos  134<H>  12-07    Urinalysis Basic - ( 07 Dec 2023 05:04 )  Color: x / Appearance: x / SG: x / pH: x  Gluc: 134 mg/dL / Ketone: x  / Bili: x / Urobili: x   Blood: x / Protein: x / Nitrite: x   Leuk Esterase: x / RBC: x / WBC x   Sq Epi: x / Non Sq Epi: x / Bacteria: x    Sodium, Random Urine: 118 mmol/L (11-30 @ 05:07)  Creatinine, Random Urine: 34 mg/dL (11-30 @ 05:07)  Protein/Creatinine Ratio Calculation: 0.8 Ratio (11-30 @ 05:07)  Osmolality, Random Urine: 344 mosm/kg (11-30 @ 05:07)  Potassium, Random Urine: 8 mmol/L (11-30 @ 05:07)    RADIOLOGY & ADDITIONAL STUDIES  CHEST XR 12/06  Bilateral pleural effusions, increased. Left loop recorder.   Stable position left PICC line Stable cardiomegaly and bony structures    US Retroperitoneal 12/01  Limited sonographic visualization of the bilateral kidneys. Normal size   kidneys without hydronephrosis.  No evidence of renal artery stenosis. Seen at bedside. No complaints today.   s/p lasix yesterday due to orthopnea with neg I/O =-- sx resolved     Allergies:  penicillin (Angioedema)    Hospital Medications:   MEDICATIONS  (STANDING):  chlorhexidine 2% Cloths 1 Application(s) Topical <User Schedule>  cholestyramine Powder (Sugar-Free) 4 Gram(s) Oral daily  epoetin matt-epbx (RETACRIT) Injectable 27191 Unit(s) SubCutaneous every 7 days  ergocalciferol 25821 Unit(s) Oral <User Schedule>  famotidine Injectable 20 milliGRAM(s) IV Push daily  fluconAZOLE IVPB 600 milliGRAM(s) IV Intermittent every 24 hours  glucagon  Injectable 1 milliGRAM(s) IntraMuscular once  heparin   Injectable 5000 Unit(s) SubCutaneous every 8 hours  hydrALAZINE Injectable 40 milliGRAM(s) IV Push every 6 hours  levothyroxine Injectable 30 MICROGram(s) IV Push at bedtime  lipid, fat emulsion (Fish Oil and Plant Based) 20% Infusion 0.55 Gm/kG/Day (21.4 mL/Hr) IV Continuous <Continuous>  metoprolol tartrate Injectable 5 milliGRAM(s) IV Push every 6 hours  Parenteral Nutrition - Adult 1 Each TPN Continuous <Continuous>  Parenteral Nutrition - Adult 1 Each TPN Continuous <Continuous>  ursodiol Suspension 300 milliGRAM(s) Oral every 8 hours  venlafaxine XR. 150 milliGRAM(s) Oral two times a day    REVIEW OF SYSTEMS:  All other review of systems is negative unless indicated above.    Vital Signs Last 24 Hrs  T(C): 36.8 (07 Dec 2023 9:00), Max: 37.3 (06 Dec 2023 17:00)  T(F): 98.3 (7 Dec 2023 9:00) Max: 99.2 (06 Dec 2023 17:00)  HR: 101 (07 Dec 2023 12:00) (96 - 107)  BP: 101/57 (07 Dec 2023 12:00) (98/55 - 166/69)  BP(mean): 74 (07 Dec 2023 12:00) (72 - 103)  RR: 29 (07 Dec 2023 12:00) (20 - 38)  SpO2: 94% (07 Dec 2023 12:00) (86% - 96%)    Parameters below as of 07 Dec 2023 14:00  Patient On (Oxygen Delivery Method): room air    12-04 @ 07:01  -  12-05 @ 07:00  --------------------------------------------------------  IN: 2862.1 mL / OUT: 3505 mL / NET: -642.9 mL    12-05 @ 07:01  -  12-06 @ 07:00  --------------------------------------------------------  IN: 3492.4 mL / OUT: 2630 mL / NET: 862.4 mL    12-06 @ 07:01  -  12-07@ 07:00  --------------------------------------------------------  IN: 2645 OUT: 4365 NET: -1719 mL    12-07 @ 07:01  - 12/07@   --------------------------------------------------------  IN: 790 mL  /  OUT: 450 mL  /  NET: 340 mL      PHYSICAL EXAM:  Constitutional: NAD, resting comfortably in bed  HEENT: NCAT, EOMI  Respiratory: CTAB, no crackles, no respiratory distress  Cardiovascular: regular rate  Gastrointestinal: abdominal wound noted with conrado pouch, PCT noted  Extremities: 1+ LE edema -  improved  Neurological: Awake, alert, moving all extremities    LABS:                     8.0    10.76 )-----------( 225      ( 07 Dec 2023 05:04 )             25.2     12-07  134<L>  |  97  |  32<H>  ----------------------------<  134<H>  4.4   |  30  |  2.39<H>    Ca    8.4      07 Dec 2023 05:04  Phos  3.5     12-07  Mg     2.2     12-07    TPro  7.8  /  Alb  2.5<L>  /  TBili  6.5<H>  /  DBili  4.2<H>  /  AST  106<H>  /  ALT  62<H>  /  AlkPhos  134<H>  12-07    Urinalysis Basic - ( 07 Dec 2023 05:04 )  Color: x / Appearance: x / SG: x / pH: x  Gluc: 134 mg/dL / Ketone: x  / Bili: x / Urobili: x   Blood: x / Protein: x / Nitrite: x   Leuk Esterase: x / RBC: x / WBC x   Sq Epi: x / Non Sq Epi: x / Bacteria: x    Sodium, Random Urine: 118 mmol/L (11-30 @ 05:07)  Creatinine, Random Urine: 34 mg/dL (11-30 @ 05:07)  Protein/Creatinine Ratio Calculation: 0.8 Ratio (11-30 @ 05:07)  Osmolality, Random Urine: 344 mosm/kg (11-30 @ 05:07)  Potassium, Random Urine: 8 mmol/L (11-30 @ 05:07)    RADIOLOGY & ADDITIONAL STUDIES  CHEST XR 12/06  Bilateral pleural effusions, increased. Left loop recorder.   Stable position left PICC line Stable cardiomegaly and bony structures    US Retroperitoneal 12/01  Limited sonographic visualization of the bilateral kidneys. Normal size   kidneys without hydronephrosis.  No evidence of renal artery stenosis.

## 2023-12-07 NOTE — PROVIDER CONTACT NOTE (OTHER) - BACKGROUND
Known history of Afib. Admission c/b afib rvr multiple times
PMH confusion/delirium
patient passed dysphagia earlier this AM and swallowed pills with no coughing
s/p sbr, IR, ileostomy creation
Dressing for Drains were changed by T1 and ostomy nurse at 12pm today.
Overnight RN notified Day shift RN that patient was confused overnight. Patient confused in the morning, redirectable. AOX4 for the day until ~1830.
Pt admitted with SBO and a hx of afib, as per day RN, patient has been having BP's in the 200's. this is not the first time.
Pt has midline incision from ex-lap on 6/28 for ileocolic resection, small bowel anastomosis. complained of pain overnight and was seen by CORTEZ Del Cid given oxycodone 5 mg and Oxycodone 2.5 mg as per MAR.
S/p exlap, TRE and ileostomy creation
Received Ativan 0.5mg at 0100
S/p exlap, TRE and ielostomy creation
78 yrs old, PMH of HTN, afrib/flutter
His AFib, recently adm for ileocolic resection, small bowel anastomosis and abd wall closure
PT was upgraded to ICU for AMS
Pt admitted for small bowel obstruction
S/p TRE, SBR and ileostomy creation
S/p exlap, TRE and ileostomy creation
dressing changed by team this AM
pt admitted for bowel  obstruction
s/p SBRX3 & IR
Upon walking into the room, patient is asking RN to tell his patients that he cannot see him today. Patient is also asking RN to tell his  to leave.
Per Pt, no recent ingestion of dark colored liquids, Pt states he is unsure if soup ingested earlier was dark-colored broth
conrado pouch to mid abdomen
CBC, CMP, T&S sent, MRSA Swab and RVP sent, urine lytes and perc cony sample sent
Pt admitted with SBO, and underwent lap TRE, among several other procedures, see chart.

## 2023-12-07 NOTE — PROGRESS NOTE ADULT - NS ATTEND AMEND GEN_ALL_CORE FT
Respiratory status has improved, though creatinine bumped slightly  pressure softer today  held hydral and giving small fluid bolus    Decision making high complexity

## 2023-12-07 NOTE — PROVIDER CONTACT NOTE (OTHER) - RECOMMENDATIONS
PA assessed patient at bedside. PA states that pt sometimes wakes up confused/disoriented and hallucinates. Pt is aware that it happens and states that it happens at home sometimes. After 5 minutes,
Pouch redressed by wound care
RN will continue to monitor
MD paged/stephenk pending instructions
Order of hydralazine, BP recheck.
PA to assess patient
Team 1 notified. MD Langford said he will come bedside
continue to monitor conrado pouch and notify JOSEPH Chadwick if sanguineous drainage continues
MD Morin notified. MD says he will come to bedside to see patient.
Team 1 notified. MD Miah Langford notified. Says he will come to bedside to assess
hold PO intake, CT scan contacted per CT patient to be put in for appointment 2:30 PM
Assess at bedside.
JOSEPH Chapman, mentioned team is aware and will come replace dressing, no intervention at this time
Provider to come to bedside to assess the patient. Possible lab draw or CT of the head.
Labetolol IV push since this seemed to work for the patient previously,
Team 1 notified. JOSEPH Chapman aware, says he will come to see patient bedside.
2L n/c placed, tylenol IVPB administered
Labs, UA, Team assessment.
Contact provider to secure the dressings.
Team to return to bedside and assess.
Notify SICU Team
hydralazine 10mg iv push ordered, as per JOSEPH Chapman, BP remain same after recheck, labetalol IVP ordered as per JOSEPH Chapman, BP improved

## 2023-12-07 NOTE — PROGRESS NOTE ADULT - ASSESSMENT
*****INCOMPLETE*****  /p: 78M w PMH Crohn's, AFib/Flutter s/p DCCVs on amiodarone, remote ileocectomy and open appendectomy. Admitted (6/23) for SBO vs Crohns flare, s/p NGT decompression and s/p lap converted to open TRE, SBR x 3, left in discontinuity with abthera vac on (6/27), RTOR for ileocolic resection, small bowel anastomosis, and abdominal wall closure on (6/28), c/b fluid collection s/p IR aspiration of perihepatic fluid on (7/3), c/b wound dehiscence s/p RTOR exlap, washout, ileocolic resection with end ileostomy, blow hole colostomy, red rubber from ileostomy to small bowel anastomosis; vicryl bridging mesh on (7/5) transferred to SICU postoperatively for hemodynamic monitoring, with hospital course complicated by periods of severe ELVI and hyponatremia, which resolved but stepped back up for to SICU on (9/10) for acute AMS, intermittent hypoglycemia, AFib with RVR. Percutaneous Cholecystostomy placed on (9/11) for enlarged GB, PCT capped 10/5 and uncapped 10/25 for hyperbilirubinemia, Right brachial DVT, left basillic and cephalic superficial thrombus on duplex 11/2, CT 11/14 with ALDEN ground glass opacity of unclear etiology, completed empiric 7day cefepime course 11/22, rising T-bili of unclear etilogy, now w fungemia (candida glabrata) CLABSI, and worsening renal function.    PICC/TPN today   Trend Cr, bolus today 2/2 to creatinine bump  Care per SICU

## 2023-12-08 NOTE — PROGRESS NOTE ADULT - ATTENDING COMMENTS
ELVI again perhaps related to hypoperfusion   holding antihypertensives-- allow BP to be 140-150s systolic   keep I/O even   will follow -- may consider bx if not sig improvement in fxn next week -- karmen considering paraproteinemia-- also low level ANCA pos  check coag if not done recently

## 2023-12-08 NOTE — PROGRESS NOTE ADULT - SUBJECTIVE AND OBJECTIVE BOX
Patient is a 78y Male seen and evaluated at bedside. No acute distress, overall feels well. BP stable, noted to be relatively hypotensive yesterday. Overall even FB, kidney function trending up 2.8 today       Meds:    chlorhexidine 2% Cloths 1 <User Schedule>  cholestyramine Powder (Sugar-Free) 4 daily  epoetin matt-epbx (RETACRIT) Injectable 72295 every 7 days  ergocalciferol 42274 <User Schedule>  glucagon  Injectable 1 once  heparin   Injectable 5000 every 8 hours  levothyroxine Injectable 30 at bedtime  lidocaine 2% Viscous 10 every 12 hours PRN  lipid, fat emulsion (Fish Oil and Plant Based) 20% Infusion 0.54 <Continuous>  LORazepam   Injectable 0.5 at bedtime PRN  metoprolol tartrate Injectable 5 every 6 hours  ondansetron Injectable 4 every 6 hours PRN  Parenteral Nutrition - Adult 1 <Continuous>  Parenteral Nutrition - Adult 1 <Continuous>  ursodiol Suspension 300 every 8 hours      T(C): , Max: 37.7 (12-07-23 @ 16:00)  T(F): , Max: 99.8 (12-07-23 @ 16:00)  HR: 93 (12-08-23 @ 12:00)  BP: 139/60 (12-08-23 @ 12:00)  BP(mean): 87 (12-08-23 @ 12:00)  RR: 22 (12-08-23 @ 12:00)  SpO2: 95% (12-08-23 @ 12:00)  Wt(kg): --    12-07 @ 07:01  -  12-08 @ 07:00  --------------------------------------------------------  IN: 2820 mL / OUT: 2750 mL / NET: 70 mL    12-08 @ 07:01  -  12-08 @ 13:37  --------------------------------------------------------  IN: 930 mL / OUT: 715 mL / NET: 215 mL          Review of Systems:  ROS negative except as per HPI      PHYSICAL EXAM:  Constitutional: NAD, resting comfortably in bed  HEENT: NCAT, EOMI  Respiratory: CTAB, no crackles, no respiratory distress  Cardiovascular: regular rate  Gastrointestinal: abdominal wound noted with conrado pouch, PCT noted  Extremities: 1+ LE edema -  improved  Neurological: Awake, alert, moving all extremities        LABS:                        8.3    12.10 )-----------( 227      ( 07 Dec 2023 16:19 )             25.4     12-08    132<L>  |  96  |  43<H>  ----------------------------<  123<H>  4.6   |  27  |  2.76<H>    Ca    8.1<L>      08 Dec 2023 04:53  Phos  4.4     12-08  Mg     2.3     12-08    TPro  7.5  /  Alb  2.3<L>  /  TBili  6.6<H>  /  DBili  4.5<H>  /  AST  113<H>  /  ALT  65<H>  /  AlkPhos  120  12-08        Urinalysis Basic - ( 08 Dec 2023 04:53 )    Color: x / Appearance: x / SG: x / pH: x  Gluc: 123 mg/dL / Ketone: x  / Bili: x / Urobili: x   Blood: x / Protein: x / Nitrite: x   Leuk Esterase: x / RBC: x / WBC x   Sq Epi: x / Non Sq Epi: x / Bacteria: x            RADIOLOGY & ADDITIONAL STUDIES:           Patient is a 78y Male seen and evaluated at bedside. No acute distress, overall feels well. BP stable, noted to be relatively hypotensive yesterday. Overall even FB, kidney function trending up 2.8 today       Meds:    chlorhexidine 2% Cloths 1 <User Schedule>  cholestyramine Powder (Sugar-Free) 4 daily  epoetin matt-epbx (RETACRIT) Injectable 91709 every 7 days  ergocalciferol 81970 <User Schedule>  glucagon  Injectable 1 once  heparin   Injectable 5000 every 8 hours  levothyroxine Injectable 30 at bedtime  lidocaine 2% Viscous 10 every 12 hours PRN  lipid, fat emulsion (Fish Oil and Plant Based) 20% Infusion 0.54 <Continuous>  LORazepam   Injectable 0.5 at bedtime PRN  metoprolol tartrate Injectable 5 every 6 hours  ondansetron Injectable 4 every 6 hours PRN  Parenteral Nutrition - Adult 1 <Continuous>  Parenteral Nutrition - Adult 1 <Continuous>  ursodiol Suspension 300 every 8 hours      T(C): , Max: 37.7 (12-07-23 @ 16:00)  T(F): , Max: 99.8 (12-07-23 @ 16:00)  HR: 93 (12-08-23 @ 12:00)  BP: 139/60 (12-08-23 @ 12:00)  BP(mean): 87 (12-08-23 @ 12:00)  RR: 22 (12-08-23 @ 12:00)  SpO2: 95% (12-08-23 @ 12:00)  Wt(kg): --    12-07 @ 07:01  -  12-08 @ 07:00  --------------------------------------------------------  IN: 2820 mL / OUT: 2750 mL / NET: 70 mL    12-08 @ 07:01  -  12-08 @ 13:37  --------------------------------------------------------  IN: 930 mL / OUT: 715 mL / NET: 215 mL          Review of Systems:  ROS negative except as per HPI      PHYSICAL EXAM:  Constitutional: NAD, resting comfortably in bed  HEENT: NCAT, EOMI  Respiratory: CTAB, no crackles, no respiratory distress  Cardiovascular: regular rate  Gastrointestinal: abdominal wound noted with conrado pouch, PCT noted  Extremities: 1+ LE edema -  improved  Neurological: Awake, alert, moving all extremities        LABS:                        8.3    12.10 )-----------( 227      ( 07 Dec 2023 16:19 )             25.4     12-08    132<L>  |  96  |  43<H>  ----------------------------<  123<H>  4.6   |  27  |  2.76<H>    Ca    8.1<L>      08 Dec 2023 04:53  Phos  4.4     12-08  Mg     2.3     12-08    TPro  7.5  /  Alb  2.3<L>  /  TBili  6.6<H>  /  DBili  4.5<H>  /  AST  113<H>  /  ALT  65<H>  /  AlkPhos  120  12-08        Urinalysis Basic - ( 08 Dec 2023 04:53 )    Color: x / Appearance: x / SG: x / pH: x  Gluc: 123 mg/dL / Ketone: x  / Bili: x / Urobili: x   Blood: x / Protein: x / Nitrite: x   Leuk Esterase: x / RBC: x / WBC x   Sq Epi: x / Non Sq Epi: x / Bacteria: x            RADIOLOGY & ADDITIONAL STUDIES:

## 2023-12-08 NOTE — PROGRESS NOTE ADULT - ATTENDING COMMENTS
episode of rapid AF last night, resolved with IV lopressor (prior dose held in the setting of soft pressure)  mild increase in cr today, giving a small amount of fluid back    Decision making high complexity

## 2023-12-08 NOTE — PROGRESS NOTE ADULT - SUBJECTIVE AND OBJECTIVE BOX
SUBJECTIVE:      MEDICATIONS  (STANDING):  chlorhexidine 2% Cloths 1 Application(s) Topical <User Schedule>  cholestyramine Powder (Sugar-Free) 4 Gram(s) Oral daily  epoetin matt-epbx (RETACRIT) Injectable 17054 Unit(s) SubCutaneous every 7 days  ergocalciferol 08721 Unit(s) Oral <User Schedule>  fluconAZOLE IVPB 600 milliGRAM(s) IV Intermittent every 24 hours  glucagon  Injectable 1 milliGRAM(s) IntraMuscular once  heparin   Injectable 5000 Unit(s) SubCutaneous every 8 hours  levothyroxine Injectable 30 MICROGram(s) IV Push at bedtime  metoprolol tartrate Injectable 5 milliGRAM(s) IV Push every 6 hours  Parenteral Nutrition - Adult 1 Each TPN Continuous <Continuous>  ursodiol Suspension 300 milliGRAM(s) Oral every 8 hours  venlafaxine XR. 150 milliGRAM(s) Oral two times a day    MEDICATIONS  (PRN):  LORazepam   Injectable 0.5 milliGRAM(s) IV Push at bedtime PRN Anxiety  ondansetron Injectable 4 milliGRAM(s) IV Push every 6 hours PRN Nausea and/or Vomiting      Vital Signs Last 24 Hrs  T(C): 36.9 (08 Dec 2023 01:06), Max: 37.7 (07 Dec 2023 16:00)  T(F): 98.4 (08 Dec 2023 01:06), Max: 99.8 (07 Dec 2023 16:00)  HR: 94 (08 Dec 2023 06:00) (93 - 130)  BP: 152/64 (08 Dec 2023 06:00) (89/50 - 152/64)  BP(mean): 92 (08 Dec 2023 06:00) (67 - 94)  RR: 34 (08 Dec 2023 06:00) (20 - 34)  SpO2: 96% (08 Dec 2023 06:00) (92% - 96%)    Parameters below as of 08 Dec 2023 07:00  Patient On (Oxygen Delivery Method): room air        PHYSICAL EXAM:  General: NAD, resting comfortably in bed  C/V: NSR  Pulm: Nonlabored breathing, no respiratory distress  Abd: soft, NT/ND. ileostomy and red rubber in place  Extrem: WWP, no edema, SCDs in place                I&O's Detail    07 Dec 2023 07:01  -  08 Dec 2023 07:00  --------------------------------------------------------  IN:    IV PiggyBack: 200 mL    IV PiggyBack: 500 mL    Oral Fluid: 360 mL    TPN (Total Parenteral Nutrition): 1760 mL  Total IN: 2820 mL    OUT:    Drain (mL): 850 mL    Drain (mL): 800 mL    Fat Emulsion (Fish Oil &amp; Plant Based) 20% Infusion: 0 mL    Voided (mL): 1100 mL  Total OUT: 2750 mL    Total NET: 70 mL          LABS:                        8.3    12.10 )-----------( 227      ( 07 Dec 2023 16:19 )             25.4     12-08    132<L>  |  96  |  43<H>  ----------------------------<  123<H>  4.6   |  27  |  2.76<H>    Ca    8.1<L>      08 Dec 2023 04:53  Phos  4.4     12-08  Mg     2.3     12-08    TPro  7.5  /  Alb  2.3<L>  /  TBili  6.6<H>  /  DBili  4.5<H>  /  AST  113<H>  /  ALT  65<H>  /  AlkPhos  120  12-08      Urinalysis Basic - ( 08 Dec 2023 04:53 )    Color: x / Appearance: x / SG: x / pH: x  Gluc: 123 mg/dL / Ketone: x  / Bili: x / Urobili: x   Blood: x / Protein: x / Nitrite: x   Leuk Esterase: x / RBC: x / WBC x   Sq Epi: x / Non Sq Epi: x / Bacteria: x        RADIOLOGY & ADDITIONAL STUDIES: SUBJECTIVE:      MEDICATIONS  (STANDING):  chlorhexidine 2% Cloths 1 Application(s) Topical <User Schedule>  cholestyramine Powder (Sugar-Free) 4 Gram(s) Oral daily  epoetin matt-epbx (RETACRIT) Injectable 60142 Unit(s) SubCutaneous every 7 days  ergocalciferol 68548 Unit(s) Oral <User Schedule>  fluconAZOLE IVPB 600 milliGRAM(s) IV Intermittent every 24 hours  glucagon  Injectable 1 milliGRAM(s) IntraMuscular once  heparin   Injectable 5000 Unit(s) SubCutaneous every 8 hours  levothyroxine Injectable 30 MICROGram(s) IV Push at bedtime  metoprolol tartrate Injectable 5 milliGRAM(s) IV Push every 6 hours  Parenteral Nutrition - Adult 1 Each TPN Continuous <Continuous>  ursodiol Suspension 300 milliGRAM(s) Oral every 8 hours  venlafaxine XR. 150 milliGRAM(s) Oral two times a day    MEDICATIONS  (PRN):  LORazepam   Injectable 0.5 milliGRAM(s) IV Push at bedtime PRN Anxiety  ondansetron Injectable 4 milliGRAM(s) IV Push every 6 hours PRN Nausea and/or Vomiting      Vital Signs Last 24 Hrs  T(C): 36.9 (08 Dec 2023 01:06), Max: 37.7 (07 Dec 2023 16:00)  T(F): 98.4 (08 Dec 2023 01:06), Max: 99.8 (07 Dec 2023 16:00)  HR: 94 (08 Dec 2023 06:00) (93 - 130)  BP: 152/64 (08 Dec 2023 06:00) (89/50 - 152/64)  BP(mean): 92 (08 Dec 2023 06:00) (67 - 94)  RR: 34 (08 Dec 2023 06:00) (20 - 34)  SpO2: 96% (08 Dec 2023 06:00) (92% - 96%)    Parameters below as of 08 Dec 2023 07:00  Patient On (Oxygen Delivery Method): room air        PHYSICAL EXAM:  General: NAD, resting comfortably in bed  C/V: NSR  Pulm: Nonlabored breathing, no respiratory distress  Abd: soft, NT/ND. ileostomy and red rubber in place  Extrem: WWP, no edema, SCDs in place                I&O's Detail    07 Dec 2023 07:01  -  08 Dec 2023 07:00  --------------------------------------------------------  IN:    IV PiggyBack: 200 mL    IV PiggyBack: 500 mL    Oral Fluid: 360 mL    TPN (Total Parenteral Nutrition): 1760 mL  Total IN: 2820 mL    OUT:    Drain (mL): 850 mL    Drain (mL): 800 mL    Fat Emulsion (Fish Oil &amp; Plant Based) 20% Infusion: 0 mL    Voided (mL): 1100 mL  Total OUT: 2750 mL    Total NET: 70 mL          LABS:                        8.3    12.10 )-----------( 227      ( 07 Dec 2023 16:19 )             25.4     12-08    132<L>  |  96  |  43<H>  ----------------------------<  123<H>  4.6   |  27  |  2.76<H>    Ca    8.1<L>      08 Dec 2023 04:53  Phos  4.4     12-08  Mg     2.3     12-08    TPro  7.5  /  Alb  2.3<L>  /  TBili  6.6<H>  /  DBili  4.5<H>  /  AST  113<H>  /  ALT  65<H>  /  AlkPhos  120  12-08      Urinalysis Basic - ( 08 Dec 2023 04:53 )    Color: x / Appearance: x / SG: x / pH: x  Gluc: 123 mg/dL / Ketone: x  / Bili: x / Urobili: x   Blood: x / Protein: x / Nitrite: x   Leuk Esterase: x / RBC: x / WBC x   Sq Epi: x / Non Sq Epi: x / Bacteria: x        RADIOLOGY & ADDITIONAL STUDIES:

## 2023-12-08 NOTE — PROGRESS NOTE ADULT - ASSESSMENT
78M w PMH Crohn's, AFib/Flutter s/p DCCVs on amiodarone, remote ileocectomy and open appendectomy. Admitted (6/23) for SBO vs Crohns flare, s/p NGT decompression and s/p lap converted to open TRE, SBR x 3, left in discontinuity with abthera vac on (6/27), RTOR for ileocolic resection, small bowel anastomosis, and abdominal wall closure on (6/28), c/b fluid collection s/p IR aspiration of perihepatic fluid on (7/3), c/b wound dehiscence s/p RTOR exlap, washout, ileocolic resection with end ileostomy, blow hole colostomy, red rubber from ileostomy to small bowel anastomosis; vicryl bridging mesh on (7/5) transferred to SICU postoperatively for hemodynamic monitoring, with hospital course complicated by periods of severe ELVI and hyponatremia, which resolved but stepped back up for to SICU on (9/10) for acute AMS, intermittent hypoglycemia, AFib with RVR. Percutaneous Cholecystostomy placed on (9/11) for enlarged GB, PCT capped 10/5 and uncapped 10/25 for hyperbilirubinemia, Right brachial DVT, left basillic and cephalic superficial thrombus on duplex 11/2, CT 11/14 with ALDEN ground glass opacity of unclear etiology, completed empiric 7day cefepime course 11/22, rising T-bili of unclear etilogy, now w fungemia (candida glabrata) CLABSI, and worsening renal function.    Trend Cr  care per SICU

## 2023-12-08 NOTE — PROGRESS NOTE ADULT - ASSESSMENT
78M w PMH Crohn's, AFib/Flutter s/p DCCVs on amiodarone, remote ileocectomy and open appendectomy. Admitted (6/23) for SBO vs Crohns flare, s/p NGT decompression and s/p lap converted to open TRE, SBR x 3, left in discontinuity with abthera vac on (6/27), RTOR for ileocolic resection, small bowel anastomosis, and abdominal wall closure on (6/28), c/b fluid collection s/p IR aspiration of perihepatic fluid on (7/3), c/b wound dehiscence s/p RTOR exlap, washout, ileocolic resection with end ileostomy, blow hole colostomy, red rubber from ileostomy to small bowel anastomosis; vicryl bridging mesh on (7/5) transferred to SICU postoperatively for hemodynamic monitoring, with hospital course complicated by periods of severe ELVI and hyponatremia, which resolved but stepped back up for to SICU on (9/10) for acute AMS, intermittent hypoglycemia, AFib with RVR. Percutaneous Cholecystostomy placed on (9/11) for enlarged GB, PCT capped 10/5 and uncapped 10/25 for hyperbilirubinemia, Right brachial DVT, left basillic and cephalic superficial thrombus on duplex 11/2, CT 11/14 with ALDEN ground glass opacity of unclear etiology, completed empiric 7day cefepime course 11/22, rising T-bili of unclear etilogy, now w resolved candida glabrata fungemia, ELVI    NEURO: AMS resolved, likely septic encephalopathy, EEG neg. Hx of depression - cont Effexor. Nausea: Zofran PRN. Anxiety: Lorazepam PRN.  CV: Hx Afib/flutter: s/p DCCV, Amio stopped due to persistent transaminitis. Continue Metoprolol 5q6 with hold parameters. Hx HLD: Lipitor held given high LFTs. TTE  (7/18) - PASP 64mmHg, EF 65-70%, Hx HTN - stopped PO Norvasc as unlikely absorbing, holding Losartan. BP borderline - continue holding hydralazine.   PULM: CT from 11/14 with ALDEN ground glass opacity of unclear etiology. COVID negative. RVP negative. completed 7d empiric cefepime 11/22 to cover for potential PNA. Dyspnea improved.  GI/FEN: Low res, low fat, high protein diet. Cont Ensure max x1/day. fungemia likely CLABSI. PICC replaced on 12/4 and continue TPN qd, lipids MWF.  High output EC fistula: cont Octreotide & Cholestyramine 10/18. Transaminitis elevated T bili of unclear origin, consulted Team 1. GI deferring decision pending further evaluation by hepatology team. s/p Perc Deb on 9/11; MRCP 10/30 no obstruction, seen by Hepatology started on ursodiol, RUQ US 11/25 CBD 5mm, hepatic vessels patent Repeat. MRCP neg. Monitor drainage from fistula, bolus as needed to keep I&O even. Ensure once daily with LPS. Folate, thiamine.  : Voids. ELVI, Cr rising today. d/c famotidine and half Effecor dose given renal dysfunction. Caspo unlikely etiology of ELVI per ID. s/p brief bicarb gtt.  MARLO Duplex and RP US unremarkable, CK wnl.  f/u renal recs pending glomerulonephritis labs.  ENDO: Hx hypothyroid: IV Synthroid, TSH low on 11/30, decreased synthroid dose, recheck TSH 12/6 wnl.  ID: BCx 11/22 grew candida glabrata, likely CLABSI. ID consulted, s/p Caspo, switched to Fluconazole (12/1-). Repeat surveillance blood cx NGTD. Ophthalmology evaulated - normal ocular exam. Echo no vegetation. PICC replaced on 12/4.  Recent MRSA swab negative, RVP panel negative, COVID negative. Cont Nystatin powder // PREVIOUSLY  caspofungin (11/24-12/1). completed empiric 7days cefepime (11/15-11/22), had C. tertium, Lactobacillis from his IR cx 7/3, and candida albicans, lactobacillus, vanc sensitive E faecium, vanc resistant E gallinarum, vanc resistant E casseliflavus, lactobacillus from his OR cx 7/5. Completed course of abx with Imipenem (9/10-9/15). Imipenem (8/26--) imipenem (6/30-7/12, 7/23-7/30), Dapto (6/30-7/5 and 7/23-7/24). Empiric dapto (8/23-24) and cefepime (8/23-24).   PPX: SCDs, SQH, was on Therapeutic lovenox bid for R brachial vein DVT, but will hold off on hep gtt since repeat US Duplex negative.  HEME: Anemia - continue Epogen weekly. S/p Iron Sucrose 200 qd x 3 days for chronic anemia.  LINES: PIVs, LUE PICC (12/4 - ) // DC: LUE PICC (9/1-11/1 ), RUE PICC: (11/1-11/24)   WOUNDS/DRAINS: Abdominal wound and fistula with wound manager in place dressing change Tuesday and Friday. RLQ Ostomy. IR perc deb tube. // DC: L Clay drain.  PT: Ambulate as tolerated   DISPO: SICU due to complicated hospital course.

## 2023-12-08 NOTE — PROGRESS NOTE ADULT - SUBJECTIVE AND OBJECTIVE BOX
ON:       SUBJECTIVE:    MEDICATIONS  (STANDING):  chlorhexidine 2% Cloths 1 Application(s) Topical <User Schedule>  cholestyramine Powder (Sugar-Free) 4 Gram(s) Oral daily  epoetin matt-epbx (RETACRIT) Injectable 89375 Unit(s) SubCutaneous every 7 days  ergocalciferol 94587 Unit(s) Oral <User Schedule>  fluconAZOLE IVPB 600 milliGRAM(s) IV Intermittent every 24 hours  glucagon  Injectable 1 milliGRAM(s) IntraMuscular once  heparin   Injectable 5000 Unit(s) SubCutaneous every 8 hours  levothyroxine Injectable 30 MICROGram(s) IV Push at bedtime  lipid, fat emulsion (Fish Oil and Plant Based) 20% Infusion 0.54 Gm/kG/Day (21 mL/Hr) IV Continuous <Continuous>  metoprolol tartrate Injectable 5 milliGRAM(s) IV Push every 6 hours  Parenteral Nutrition - Adult 1 Each TPN Continuous <Continuous>  Parenteral Nutrition - Adult 1 Each TPN Continuous <Continuous>  ursodiol Suspension 300 milliGRAM(s) Oral every 8 hours    MEDICATIONS  (PRN):  LORazepam   Injectable 0.5 milliGRAM(s) IV Push at bedtime PRN Anxiety  ondansetron Injectable 4 milliGRAM(s) IV Push every 6 hours PRN Nausea and/or Vomiting      Drips:     ICU Vital Signs Last 24 Hrs  T(C): 37.1 (08 Dec 2023 08:37), Max: 37.7 (07 Dec 2023 16:00)  T(F): 98.7 (08 Dec 2023 08:37), Max: 99.8 (07 Dec 2023 16:00)  HR: 94 (08 Dec 2023 11:00) (92 - 130)  BP: 105/55 (08 Dec 2023 11:00) (89/50 - 152/64)  BP(mean): 74 (08 Dec 2023 11:00) (67 - 94)  ABP: --  ABP(mean): --  RR: 21 (08 Dec 2023 11:00) (20 - 34)  SpO2: 95% (08 Dec 2023 11:00) (93% - 96%)    O2 Parameters below as of 08 Dec 2023 11:00  Patient On (Oxygen Delivery Method): room air            Physical Exam:  General: NAD  HEENT: NC/AT, EOMI, PERRLA, normal hearing, no oral lesions, neck supple w/o LAD  Pulmonary: Nonlabored breathing, no respiratory distress, CTA-B  Cardiovascular: NSR, no murmurs  Abdominal: soft, NT/ND, +BS, no organomegaly  Extremities: WWP, 5/5 strength x 4, no clubbing/cyanosis/edema  Neuro: A/O x3, CNs II-XII grossly intact, normal motor/sensation, no focal deficits  Pulses: palpable distal pulses    Lines/tubes/drains:  Poole:	      Vent settings:      I&O's Summary    07 Dec 2023 07:01  -  08 Dec 2023 07:00  --------------------------------------------------------  IN: 2820 mL / OUT: 2750 mL / NET: 70 mL    08 Dec 2023 07:01  -  08 Dec 2023 11:29  --------------------------------------------------------  IN: 530 mL / OUT: 540 mL / NET: -10 mL        LABS:                        8.3    12.10 )-----------( 227      ( 07 Dec 2023 16:19 )             25.4     12-08    132<L>  |  96  |  43<H>  ----------------------------<  123<H>  4.6   |  27  |  2.76<H>    Ca    8.1<L>      08 Dec 2023 04:53  Phos  4.4     12-08  Mg     2.3     12-08    TPro  7.5  /  Alb  2.3<L>  /  TBili  6.6<H>  /  DBili  4.5<H>  /  AST  113<H>  /  ALT  65<H>  /  AlkPhos  120  12-08      Urinalysis Basic - ( 08 Dec 2023 04:53 )    Color: x / Appearance: x / SG: x / pH: x  Gluc: 123 mg/dL / Ketone: x  / Bili: x / Urobili: x   Blood: x / Protein: x / Nitrite: x   Leuk Esterase: x / RBC: x / WBC x   Sq Epi: x / Non Sq Epi: x / Bacteria: x      CAPILLARY BLOOD GLUCOSE        LIVER FUNCTIONS - ( 08 Dec 2023 04:53 )  Alb: 2.3 g/dL / Pro: 7.5 g/dL / ALK PHOS: 120 U/L / ALT: 65 U/L / AST: 113 U/L / GGT: x             Cultures:    RADIOLOGY & ADDITIONAL STUDIES:     ON:       SUBJECTIVE:    MEDICATIONS  (STANDING):  chlorhexidine 2% Cloths 1 Application(s) Topical <User Schedule>  cholestyramine Powder (Sugar-Free) 4 Gram(s) Oral daily  epoetin matt-epbx (RETACRIT) Injectable 90093 Unit(s) SubCutaneous every 7 days  ergocalciferol 89117 Unit(s) Oral <User Schedule>  fluconAZOLE IVPB 600 milliGRAM(s) IV Intermittent every 24 hours  glucagon  Injectable 1 milliGRAM(s) IntraMuscular once  heparin   Injectable 5000 Unit(s) SubCutaneous every 8 hours  levothyroxine Injectable 30 MICROGram(s) IV Push at bedtime  lipid, fat emulsion (Fish Oil and Plant Based) 20% Infusion 0.54 Gm/kG/Day (21 mL/Hr) IV Continuous <Continuous>  metoprolol tartrate Injectable 5 milliGRAM(s) IV Push every 6 hours  Parenteral Nutrition - Adult 1 Each TPN Continuous <Continuous>  Parenteral Nutrition - Adult 1 Each TPN Continuous <Continuous>  ursodiol Suspension 300 milliGRAM(s) Oral every 8 hours    MEDICATIONS  (PRN):  LORazepam   Injectable 0.5 milliGRAM(s) IV Push at bedtime PRN Anxiety  ondansetron Injectable 4 milliGRAM(s) IV Push every 6 hours PRN Nausea and/or Vomiting      Drips:     ICU Vital Signs Last 24 Hrs  T(C): 37.1 (08 Dec 2023 08:37), Max: 37.7 (07 Dec 2023 16:00)  T(F): 98.7 (08 Dec 2023 08:37), Max: 99.8 (07 Dec 2023 16:00)  HR: 94 (08 Dec 2023 11:00) (92 - 130)  BP: 105/55 (08 Dec 2023 11:00) (89/50 - 152/64)  BP(mean): 74 (08 Dec 2023 11:00) (67 - 94)  ABP: --  ABP(mean): --  RR: 21 (08 Dec 2023 11:00) (20 - 34)  SpO2: 95% (08 Dec 2023 11:00) (93% - 96%)    O2 Parameters below as of 08 Dec 2023 11:00  Patient On (Oxygen Delivery Method): room air            Physical Exam:  General: NAD  HEENT: NC/AT, EOMI, PERRLA, normal hearing, no oral lesions, neck supple w/o LAD  Pulmonary: Nonlabored breathing, no respiratory distress, CTA-B  Cardiovascular: NSR, no murmurs  Abdominal: soft, NT/ND, +BS, no organomegaly  Extremities: WWP, 5/5 strength x 4, no clubbing/cyanosis/edema  Neuro: A/O x3, CNs II-XII grossly intact, normal motor/sensation, no focal deficits  Pulses: palpable distal pulses    Lines/tubes/drains:  Poole:	      Vent settings:      I&O's Summary    07 Dec 2023 07:01  -  08 Dec 2023 07:00  --------------------------------------------------------  IN: 2820 mL / OUT: 2750 mL / NET: 70 mL    08 Dec 2023 07:01  -  08 Dec 2023 11:29  --------------------------------------------------------  IN: 530 mL / OUT: 540 mL / NET: -10 mL        LABS:                        8.3    12.10 )-----------( 227      ( 07 Dec 2023 16:19 )             25.4     12-08    132<L>  |  96  |  43<H>  ----------------------------<  123<H>  4.6   |  27  |  2.76<H>    Ca    8.1<L>      08 Dec 2023 04:53  Phos  4.4     12-08  Mg     2.3     12-08    TPro  7.5  /  Alb  2.3<L>  /  TBili  6.6<H>  /  DBili  4.5<H>  /  AST  113<H>  /  ALT  65<H>  /  AlkPhos  120  12-08      Urinalysis Basic - ( 08 Dec 2023 04:53 )    Color: x / Appearance: x / SG: x / pH: x  Gluc: 123 mg/dL / Ketone: x  / Bili: x / Urobili: x   Blood: x / Protein: x / Nitrite: x   Leuk Esterase: x / RBC: x / WBC x   Sq Epi: x / Non Sq Epi: x / Bacteria: x      CAPILLARY BLOOD GLUCOSE        LIVER FUNCTIONS - ( 08 Dec 2023 04:53 )  Alb: 2.3 g/dL / Pro: 7.5 g/dL / ALK PHOS: 120 U/L / ALT: 65 U/L / AST: 113 U/L / GGT: x             Cultures:    RADIOLOGY & ADDITIONAL STUDIES:     ON: NSR - rate controlled A-fib earlier. , net +ve 670. No respiratory distress     SUBJECTIVE: Patient seen and examined at bedside. In good spirits, SOB resolved. Denies abdominal pain, able to rhoda PO without n/v.    MEDICATIONS  (STANDING):  chlorhexidine 2% Cloths 1 Application(s) Topical <User Schedule>  cholestyramine Powder (Sugar-Free) 4 Gram(s) Oral daily  epoetin matt-epbx (RETACRIT) Injectable 05678 Unit(s) SubCutaneous every 7 days  ergocalciferol 43128 Unit(s) Oral <User Schedule>  fluconAZOLE IVPB 600 milliGRAM(s) IV Intermittent every 24 hours  glucagon  Injectable 1 milliGRAM(s) IntraMuscular once  heparin   Injectable 5000 Unit(s) SubCutaneous every 8 hours  levothyroxine Injectable 30 MICROGram(s) IV Push at bedtime  lipid, fat emulsion (Fish Oil and Plant Based) 20% Infusion 0.54 Gm/kG/Day (21 mL/Hr) IV Continuous <Continuous>  metoprolol tartrate Injectable 5 milliGRAM(s) IV Push every 6 hours  Parenteral Nutrition - Adult 1 Each TPN Continuous <Continuous>  Parenteral Nutrition - Adult 1 Each TPN Continuous <Continuous>  ursodiol Suspension 300 milliGRAM(s) Oral every 8 hours    MEDICATIONS  (PRN):  LORazepam   Injectable 0.5 milliGRAM(s) IV Push at bedtime PRN Anxiety  ondansetron Injectable 4 milliGRAM(s) IV Push every 6 hours PRN Nausea and/or Vomiting      Drips:     ICU Vital Signs Last 24 Hrs  T(C): 37.1 (08 Dec 2023 08:37), Max: 37.7 (07 Dec 2023 16:00)  T(F): 98.7 (08 Dec 2023 08:37), Max: 99.8 (07 Dec 2023 16:00)  HR: 94 (08 Dec 2023 11:00) (92 - 130)  BP: 105/55 (08 Dec 2023 11:00) (89/50 - 152/64)  BP(mean): 74 (08 Dec 2023 11:00) (67 - 94)  ABP: --  ABP(mean): --  RR: 21 (08 Dec 2023 11:00) (20 - 34)  SpO2: 95% (08 Dec 2023 11:00) (93% - 96%)    O2 Parameters below as of 08 Dec 2023 11:00  Patient On (Oxygen Delivery Method): room air            Physical Exam:  General: NAD  HEENT: NC/AT, EOMI, PERRLA, normal hearing, no oral lesions, neck supple w/o LAD  Pulmonary: Nonlabored breathing, no respiratory distress, CTA-B  Cardiovascular: NSR, no murmurs  Abdominal: soft, NT/ND, +BS, no organomegaly  Extremities: WWP, 5/5 strength x 4, no clubbing/cyanosis/edema  Neuro: A/O x3, CNs II-XII grossly intact, normal motor/sensation, no focal deficits  Pulses: palpable distal pulses    Lines: LUE PICC  Poole:	      Vent settings:      I&O's Summary    07 Dec 2023 07:01  -  08 Dec 2023 07:00  --------------------------------------------------------  IN: 2820 mL / OUT: 2750 mL / NET: 70 mL    08 Dec 2023 07:01  -  08 Dec 2023 11:29  --------------------------------------------------------  IN: 530 mL / OUT: 540 mL / NET: -10 mL        LABS:                        8.3    12.10 )-----------( 227      ( 07 Dec 2023 16:19 )             25.4     12-08    132<L>  |  96  |  43<H>  ----------------------------<  123<H>  4.6   |  27  |  2.76<H>    Ca    8.1<L>      08 Dec 2023 04:53  Phos  4.4     12-08  Mg     2.3     12-08    TPro  7.5  /  Alb  2.3<L>  /  TBili  6.6<H>  /  DBili  4.5<H>  /  AST  113<H>  /  ALT  65<H>  /  AlkPhos  120  12-08      Urinalysis Basic - ( 08 Dec 2023 04:53 )    Color: x / Appearance: x / SG: x / pH: x  Gluc: 123 mg/dL / Ketone: x  / Bili: x / Urobili: x   Blood: x / Protein: x / Nitrite: x   Leuk Esterase: x / RBC: x / WBC x   Sq Epi: x / Non Sq Epi: x / Bacteria: x      CAPILLARY BLOOD GLUCOSE        LIVER FUNCTIONS - ( 08 Dec 2023 04:53 )  Alb: 2.3 g/dL / Pro: 7.5 g/dL / ALK PHOS: 120 U/L / ALT: 65 U/L / AST: 113 U/L / GGT: x             Cultures:    RADIOLOGY & ADDITIONAL STUDIES:     ON: NSR - rate controlled A-fib earlier. , net +ve 670. No respiratory distress     SUBJECTIVE: Patient seen and examined at bedside. In good spirits, SOB resolved. Denies abdominal pain, able to rhoda PO without n/v.    MEDICATIONS  (STANDING):  chlorhexidine 2% Cloths 1 Application(s) Topical <User Schedule>  cholestyramine Powder (Sugar-Free) 4 Gram(s) Oral daily  epoetin matt-epbx (RETACRIT) Injectable 54867 Unit(s) SubCutaneous every 7 days  ergocalciferol 63226 Unit(s) Oral <User Schedule>  fluconAZOLE IVPB 600 milliGRAM(s) IV Intermittent every 24 hours  glucagon  Injectable 1 milliGRAM(s) IntraMuscular once  heparin   Injectable 5000 Unit(s) SubCutaneous every 8 hours  levothyroxine Injectable 30 MICROGram(s) IV Push at bedtime  lipid, fat emulsion (Fish Oil and Plant Based) 20% Infusion 0.54 Gm/kG/Day (21 mL/Hr) IV Continuous <Continuous>  metoprolol tartrate Injectable 5 milliGRAM(s) IV Push every 6 hours  Parenteral Nutrition - Adult 1 Each TPN Continuous <Continuous>  Parenteral Nutrition - Adult 1 Each TPN Continuous <Continuous>  ursodiol Suspension 300 milliGRAM(s) Oral every 8 hours    MEDICATIONS  (PRN):  LORazepam   Injectable 0.5 milliGRAM(s) IV Push at bedtime PRN Anxiety  ondansetron Injectable 4 milliGRAM(s) IV Push every 6 hours PRN Nausea and/or Vomiting      Drips:     ICU Vital Signs Last 24 Hrs  T(C): 37.1 (08 Dec 2023 08:37), Max: 37.7 (07 Dec 2023 16:00)  T(F): 98.7 (08 Dec 2023 08:37), Max: 99.8 (07 Dec 2023 16:00)  HR: 94 (08 Dec 2023 11:00) (92 - 130)  BP: 105/55 (08 Dec 2023 11:00) (89/50 - 152/64)  BP(mean): 74 (08 Dec 2023 11:00) (67 - 94)  ABP: --  ABP(mean): --  RR: 21 (08 Dec 2023 11:00) (20 - 34)  SpO2: 95% (08 Dec 2023 11:00) (93% - 96%)    O2 Parameters below as of 08 Dec 2023 11:00  Patient On (Oxygen Delivery Method): room air            Physical Exam:  General: NAD  HEENT: NC/AT, EOMI, PERRLA, normal hearing, no oral lesions, neck supple w/o LAD  Pulmonary: Nonlabored breathing, no respiratory distress, CTA-B  Cardiovascular: NSR, no murmurs  Abdominal: soft, NT/ND, +BS, no organomegaly  Extremities: WWP, 5/5 strength x 4, no clubbing/cyanosis/edema  Neuro: A/O x3, CNs II-XII grossly intact, normal motor/sensation, no focal deficits  Pulses: palpable distal pulses    Lines: LUE PICC  Poole:	      Vent settings:      I&O's Summary    07 Dec 2023 07:01  -  08 Dec 2023 07:00  --------------------------------------------------------  IN: 2820 mL / OUT: 2750 mL / NET: 70 mL    08 Dec 2023 07:01  -  08 Dec 2023 11:29  --------------------------------------------------------  IN: 530 mL / OUT: 540 mL / NET: -10 mL        LABS:                        8.3    12.10 )-----------( 227      ( 07 Dec 2023 16:19 )             25.4     12-08    132<L>  |  96  |  43<H>  ----------------------------<  123<H>  4.6   |  27  |  2.76<H>    Ca    8.1<L>      08 Dec 2023 04:53  Phos  4.4     12-08  Mg     2.3     12-08    TPro  7.5  /  Alb  2.3<L>  /  TBili  6.6<H>  /  DBili  4.5<H>  /  AST  113<H>  /  ALT  65<H>  /  AlkPhos  120  12-08      Urinalysis Basic - ( 08 Dec 2023 04:53 )    Color: x / Appearance: x / SG: x / pH: x  Gluc: 123 mg/dL / Ketone: x  / Bili: x / Urobili: x   Blood: x / Protein: x / Nitrite: x   Leuk Esterase: x / RBC: x / WBC x   Sq Epi: x / Non Sq Epi: x / Bacteria: x      CAPILLARY BLOOD GLUCOSE        LIVER FUNCTIONS - ( 08 Dec 2023 04:53 )  Alb: 2.3 g/dL / Pro: 7.5 g/dL / ALK PHOS: 120 U/L / ALT: 65 U/L / AST: 113 U/L / GGT: x             Cultures:    RADIOLOGY & ADDITIONAL STUDIES:     ON: NSR - rate controlled A-fib earlier. , net +ve 670. No respiratory distress     SUBJECTIVE: Patient seen and examined at bedside. In good spirits, SOB resolved. Denies abdominal pain, able to rhoda PO without n/v.    MEDICATIONS  (STANDING):  chlorhexidine 2% Cloths 1 Application(s) Topical <User Schedule>  cholestyramine Powder (Sugar-Free) 4 Gram(s) Oral daily  epoetin matt-epbx (RETACRIT) Injectable 70637 Unit(s) SubCutaneous every 7 days  ergocalciferol 66753 Unit(s) Oral <User Schedule>  fluconAZOLE IVPB 600 milliGRAM(s) IV Intermittent every 24 hours  glucagon  Injectable 1 milliGRAM(s) IntraMuscular once  heparin   Injectable 5000 Unit(s) SubCutaneous every 8 hours  levothyroxine Injectable 30 MICROGram(s) IV Push at bedtime  lipid, fat emulsion (Fish Oil and Plant Based) 20% Infusion 0.54 Gm/kG/Day (21 mL/Hr) IV Continuous <Continuous>  metoprolol tartrate Injectable 5 milliGRAM(s) IV Push every 6 hours  Parenteral Nutrition - Adult 1 Each TPN Continuous <Continuous>  Parenteral Nutrition - Adult 1 Each TPN Continuous <Continuous>  ursodiol Suspension 300 milliGRAM(s) Oral every 8 hours    MEDICATIONS  (PRN):  LORazepam   Injectable 0.5 milliGRAM(s) IV Push at bedtime PRN Anxiety  ondansetron Injectable 4 milliGRAM(s) IV Push every 6 hours PRN Nausea and/or Vomiting      Drips:     ICU Vital Signs Last 24 Hrs  T(C): 37.1 (08 Dec 2023 08:37), Max: 37.7 (07 Dec 2023 16:00)  T(F): 98.7 (08 Dec 2023 08:37), Max: 99.8 (07 Dec 2023 16:00)  HR: 94 (08 Dec 2023 11:00) (92 - 130)  BP: 105/55 (08 Dec 2023 11:00) (89/50 - 152/64)  BP(mean): 74 (08 Dec 2023 11:00) (67 - 94)  ABP: --  ABP(mean): --  RR: 21 (08 Dec 2023 11:00) (20 - 34)  SpO2: 95% (08 Dec 2023 11:00) (93% - 96%)    O2 Parameters below as of 08 Dec 2023 11:00  Patient On (Oxygen Delivery Method): room air            Physical Exam:  General: NAD  HEENT: NC/AT, EOMI, PERRLA, normal hearing, no oral lesions, neck supple w/o LAD  Pulmonary: Nonlabored breathing, no respiratory distress, CTA-B  Cardiovascular: NSR, no murmurs  Abdominal: soft, NT/ND. Perc cony drain with bilious output, EC fistula with enteric output.  Extremities: WWP, no clubbing/cyanosis, 1+ pedal edema  Neuro: A/O x3, CNs II-XII grossly intact, normal motor/sensation, no focal deficits  Pulses: palpable distal pulses    Lines: ZAHEER PICC      I&O's Summary    07 Dec 2023 07:01  -  08 Dec 2023 07:00  --------------------------------------------------------  IN: 2820 mL / OUT: 2750 mL / NET: 70 mL    08 Dec 2023 07:01  -  08 Dec 2023 11:29  --------------------------------------------------------  IN: 530 mL / OUT: 540 mL / NET: -10 mL        LABS:                        8.3    12.10 )-----------( 227      ( 07 Dec 2023 16:19 )             25.4     12-08    132<L>  |  96  |  43<H>  ----------------------------<  123<H>  4.6   |  27  |  2.76<H>    Ca    8.1<L>      08 Dec 2023 04:53  Phos  4.4     12-08  Mg     2.3     12-08    TPro  7.5  /  Alb  2.3<L>  /  TBili  6.6<H>  /  DBili  4.5<H>  /  AST  113<H>  /  ALT  65<H>  /  AlkPhos  120  12-08      Urinalysis Basic - ( 08 Dec 2023 04:53 )    Color: x / Appearance: x / SG: x / pH: x  Gluc: 123 mg/dL / Ketone: x  / Bili: x / Urobili: x   Blood: x / Protein: x / Nitrite: x   Leuk Esterase: x / RBC: x / WBC x   Sq Epi: x / Non Sq Epi: x / Bacteria: x      CAPILLARY BLOOD GLUCOSE        LIVER FUNCTIONS - ( 08 Dec 2023 04:53 )  Alb: 2.3 g/dL / Pro: 7.5 g/dL / ALK PHOS: 120 U/L / ALT: 65 U/L / AST: 113 U/L / GGT: x             Cultures:    RADIOLOGY & ADDITIONAL STUDIES:     ON: NSR - rate controlled A-fib earlier. , net +ve 670. No respiratory distress     SUBJECTIVE: Patient seen and examined at bedside. In good spirits, SOB resolved. Denies abdominal pain, able to rhoda PO without n/v.    MEDICATIONS  (STANDING):  chlorhexidine 2% Cloths 1 Application(s) Topical <User Schedule>  cholestyramine Powder (Sugar-Free) 4 Gram(s) Oral daily  epoetin matt-epbx (RETACRIT) Injectable 84817 Unit(s) SubCutaneous every 7 days  ergocalciferol 71048 Unit(s) Oral <User Schedule>  fluconAZOLE IVPB 600 milliGRAM(s) IV Intermittent every 24 hours  glucagon  Injectable 1 milliGRAM(s) IntraMuscular once  heparin   Injectable 5000 Unit(s) SubCutaneous every 8 hours  levothyroxine Injectable 30 MICROGram(s) IV Push at bedtime  lipid, fat emulsion (Fish Oil and Plant Based) 20% Infusion 0.54 Gm/kG/Day (21 mL/Hr) IV Continuous <Continuous>  metoprolol tartrate Injectable 5 milliGRAM(s) IV Push every 6 hours  Parenteral Nutrition - Adult 1 Each TPN Continuous <Continuous>  Parenteral Nutrition - Adult 1 Each TPN Continuous <Continuous>  ursodiol Suspension 300 milliGRAM(s) Oral every 8 hours    MEDICATIONS  (PRN):  LORazepam   Injectable 0.5 milliGRAM(s) IV Push at bedtime PRN Anxiety  ondansetron Injectable 4 milliGRAM(s) IV Push every 6 hours PRN Nausea and/or Vomiting      Drips:     ICU Vital Signs Last 24 Hrs  T(C): 37.1 (08 Dec 2023 08:37), Max: 37.7 (07 Dec 2023 16:00)  T(F): 98.7 (08 Dec 2023 08:37), Max: 99.8 (07 Dec 2023 16:00)  HR: 94 (08 Dec 2023 11:00) (92 - 130)  BP: 105/55 (08 Dec 2023 11:00) (89/50 - 152/64)  BP(mean): 74 (08 Dec 2023 11:00) (67 - 94)  ABP: --  ABP(mean): --  RR: 21 (08 Dec 2023 11:00) (20 - 34)  SpO2: 95% (08 Dec 2023 11:00) (93% - 96%)    O2 Parameters below as of 08 Dec 2023 11:00  Patient On (Oxygen Delivery Method): room air            Physical Exam:  General: NAD  HEENT: NC/AT, EOMI, PERRLA, normal hearing, no oral lesions, neck supple w/o LAD  Pulmonary: Nonlabored breathing, no respiratory distress, CTA-B  Cardiovascular: NSR, no murmurs  Abdominal: soft, NT/ND. Perc cony drain with bilious output, EC fistula with enteric output.  Extremities: WWP, no clubbing/cyanosis, 1+ pedal edema  Neuro: A/O x3, CNs II-XII grossly intact, normal motor/sensation, no focal deficits  Pulses: palpable distal pulses    Lines: ZAHEER PICC      I&O's Summary    07 Dec 2023 07:01  -  08 Dec 2023 07:00  --------------------------------------------------------  IN: 2820 mL / OUT: 2750 mL / NET: 70 mL    08 Dec 2023 07:01  -  08 Dec 2023 11:29  --------------------------------------------------------  IN: 530 mL / OUT: 540 mL / NET: -10 mL        LABS:                        8.3    12.10 )-----------( 227      ( 07 Dec 2023 16:19 )             25.4     12-08    132<L>  |  96  |  43<H>  ----------------------------<  123<H>  4.6   |  27  |  2.76<H>    Ca    8.1<L>      08 Dec 2023 04:53  Phos  4.4     12-08  Mg     2.3     12-08    TPro  7.5  /  Alb  2.3<L>  /  TBili  6.6<H>  /  DBili  4.5<H>  /  AST  113<H>  /  ALT  65<H>  /  AlkPhos  120  12-08      Urinalysis Basic - ( 08 Dec 2023 04:53 )    Color: x / Appearance: x / SG: x / pH: x  Gluc: 123 mg/dL / Ketone: x  / Bili: x / Urobili: x   Blood: x / Protein: x / Nitrite: x   Leuk Esterase: x / RBC: x / WBC x   Sq Epi: x / Non Sq Epi: x / Bacteria: x      CAPILLARY BLOOD GLUCOSE        LIVER FUNCTIONS - ( 08 Dec 2023 04:53 )  Alb: 2.3 g/dL / Pro: 7.5 g/dL / ALK PHOS: 120 U/L / ALT: 65 U/L / AST: 113 U/L / GGT: x             Cultures:    RADIOLOGY & ADDITIONAL STUDIES:

## 2023-12-08 NOTE — PROGRESS NOTE ADULT - ASSESSMENT
79 yo M with prolonged hospital course, initially  admitted for SBO and CD flare s/p multiple post op complications now with recurrent  non-oliguric sATN 2/2 candidemia , with initial improvement however ,last 48hrs with relative hypotension and perfusion shifts with sCr now at 2.8, as  likely rerfusional related rise. 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  - On parenteral nutrition, encourage PO intake   - Daily BMP  - Maintain MAP >65 for renal perfusion  - Strict I&O, daily  77 yo M with prolonged hospital course, initially  admitted for SBO and CD flare s/p multiple post op complications now with recurrent  non-oliguric sATN 2/2 candidemia , with initial improvement however ,last 48hrs with relative hypotension and perfusion shifts with sCr now at 2.8, as  likely rerfusional related rise. 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  - On parenteral nutrition, encourage PO intake   - Daily BMP  - Maintain MAP >65 for renal perfusion  - Strict I&O, daily

## 2023-12-09 NOTE — PROGRESS NOTE ADULT - SUBJECTIVE AND OBJECTIVE BOX
SUBJECTIVE:      MEDICATIONS  (STANDING):  chlorhexidine 2% Cloths 1 Application(s) Topical <User Schedule>  cholestyramine Powder (Sugar-Free) 4 Gram(s) Oral daily  epoetin matt-epbx (RETACRIT) Injectable 31570 Unit(s) SubCutaneous every 7 days  ergocalciferol 13817 Unit(s) Oral <User Schedule>  glucagon  Injectable 1 milliGRAM(s) IntraMuscular once  heparin   Injectable 5000 Unit(s) SubCutaneous every 8 hours  levothyroxine Injectable 30 MICROGram(s) IV Push at bedtime  lipid, fat emulsion (Fish Oil and Plant Based) 20% Infusion 0.54 Gm/kG/Day (20.83 mL/Hr) IV Continuous <Continuous>  metoprolol tartrate Injectable 5 milliGRAM(s) IV Push every 6 hours  Parenteral Nutrition - Adult 1 Each TPN Continuous <Continuous>  ursodiol Suspension 300 milliGRAM(s) Oral every 8 hours  venlafaxine XR. 150 milliGRAM(s) Oral daily    MEDICATIONS  (PRN):  lidocaine 2% Viscous 10 milliLiter(s) Swish and Spit every 12 hours PRN tongue pain  LORazepam   Injectable 0.5 milliGRAM(s) IV Push at bedtime PRN Anxiety  ondansetron Injectable 4 milliGRAM(s) IV Push every 6 hours PRN Nausea and/or Vomiting      Vital Signs Last 24 Hrs  T(C): 36.4 (09 Dec 2023 05:15), Max: 37.1 (08 Dec 2023 18:01)  T(F): 97.6 (09 Dec 2023 05:15), Max: 98.7 (08 Dec 2023 18:01)  HR: 92 (09 Dec 2023 08:00) (87 - 98)  BP: 134/62 (09 Dec 2023 08:00) (99/61 - 144/65)  BP(mean): 89 (09 Dec 2023 08:00) (74 - 93)  RR: 26 (09 Dec 2023 08:00) (21 - 31)  SpO2: 93% (09 Dec 2023 08:00) (92% - 96%)    Parameters below as of 09 Dec 2023 08:00  Patient On (Oxygen Delivery Method): room air        Physical Exam:  General: NAD, resting comfortably in bed  Pulmonary: Nonlabored breathing, no respiratory distress  Cardiovascular: NSR  Abdominal: soft, NT/ND  Extremities: WWP, normal strength  Neuro: A/O x 3, CNs II-XII grossly intact, no focal deficits    I&O's Summary    08 Dec 2023 07:01  -  09 Dec 2023 07:00  --------------------------------------------------------  IN: 3237 mL / OUT: 2665 mL / NET: 572 mL    09 Dec 2023 07:01  -  09 Dec 2023 08:38  --------------------------------------------------------  IN: 99 mL / OUT: 100 mL / NET: -1 mL        LABS:                        7.5    8.82  )-----------( 189      ( 09 Dec 2023 03:48 )             23.3     12-09    133<L>  |  99  |  46<H>  ----------------------------<  119<H>  4.5   |  26  |  2.74<H>    Ca    8.0<L>      09 Dec 2023 03:48  Phos  4.0     12-09  Mg     2.3     12-09    TPro  7.4  /  Alb  2.1<L>  /  TBili  6.3<H>  /  DBili  4.5<H>  /  AST  104<H>  /  ALT  62<H>  /  AlkPhos  115  12-09      Urinalysis Basic - ( 09 Dec 2023 03:48 )    Color: x / Appearance: x / SG: x / pH: x  Gluc: 119 mg/dL / Ketone: x  / Bili: x / Urobili: x   Blood: x / Protein: x / Nitrite: x   Leuk Esterase: x / RBC: x / WBC x   Sq Epi: x / Non Sq Epi: x / Bacteria: x      CAPILLARY BLOOD GLUCOSE      POCT Blood Glucose.: 130 mg/dL (09 Dec 2023 06:31)    LIVER FUNCTIONS - ( 09 Dec 2023 03:48 )  Alb: 2.1 g/dL / Pro: 7.4 g/dL / ALK PHOS: 115 U/L / ALT: 62 U/L / AST: 104 U/L / GGT: x             RADIOLOGY & ADDITIONAL STUDIES:   SUBJECTIVE:      MEDICATIONS  (STANDING):  chlorhexidine 2% Cloths 1 Application(s) Topical <User Schedule>  cholestyramine Powder (Sugar-Free) 4 Gram(s) Oral daily  epoetin matt-epbx (RETACRIT) Injectable 70107 Unit(s) SubCutaneous every 7 days  ergocalciferol 71980 Unit(s) Oral <User Schedule>  glucagon  Injectable 1 milliGRAM(s) IntraMuscular once  heparin   Injectable 5000 Unit(s) SubCutaneous every 8 hours  levothyroxine Injectable 30 MICROGram(s) IV Push at bedtime  lipid, fat emulsion (Fish Oil and Plant Based) 20% Infusion 0.54 Gm/kG/Day (20.83 mL/Hr) IV Continuous <Continuous>  metoprolol tartrate Injectable 5 milliGRAM(s) IV Push every 6 hours  Parenteral Nutrition - Adult 1 Each TPN Continuous <Continuous>  ursodiol Suspension 300 milliGRAM(s) Oral every 8 hours  venlafaxine XR. 150 milliGRAM(s) Oral daily    MEDICATIONS  (PRN):  lidocaine 2% Viscous 10 milliLiter(s) Swish and Spit every 12 hours PRN tongue pain  LORazepam   Injectable 0.5 milliGRAM(s) IV Push at bedtime PRN Anxiety  ondansetron Injectable 4 milliGRAM(s) IV Push every 6 hours PRN Nausea and/or Vomiting      Vital Signs Last 24 Hrs  T(C): 36.4 (09 Dec 2023 05:15), Max: 37.1 (08 Dec 2023 18:01)  T(F): 97.6 (09 Dec 2023 05:15), Max: 98.7 (08 Dec 2023 18:01)  HR: 92 (09 Dec 2023 08:00) (87 - 98)  BP: 134/62 (09 Dec 2023 08:00) (99/61 - 144/65)  BP(mean): 89 (09 Dec 2023 08:00) (74 - 93)  RR: 26 (09 Dec 2023 08:00) (21 - 31)  SpO2: 93% (09 Dec 2023 08:00) (92% - 96%)    Parameters below as of 09 Dec 2023 08:00  Patient On (Oxygen Delivery Method): room air        Physical Exam:  General: NAD, resting comfortably in bed  Pulmonary: Nonlabored breathing, no respiratory distress  Cardiovascular: NSR  Abdominal: soft, NT/ND  Extremities: WWP, normal strength  Neuro: A/O x 3, CNs II-XII grossly intact, no focal deficits    I&O's Summary    08 Dec 2023 07:01  -  09 Dec 2023 07:00  --------------------------------------------------------  IN: 3237 mL / OUT: 2665 mL / NET: 572 mL    09 Dec 2023 07:01  -  09 Dec 2023 08:38  --------------------------------------------------------  IN: 99 mL / OUT: 100 mL / NET: -1 mL        LABS:                        7.5    8.82  )-----------( 189      ( 09 Dec 2023 03:48 )             23.3     12-09    133<L>  |  99  |  46<H>  ----------------------------<  119<H>  4.5   |  26  |  2.74<H>    Ca    8.0<L>      09 Dec 2023 03:48  Phos  4.0     12-09  Mg     2.3     12-09    TPro  7.4  /  Alb  2.1<L>  /  TBili  6.3<H>  /  DBili  4.5<H>  /  AST  104<H>  /  ALT  62<H>  /  AlkPhos  115  12-09      Urinalysis Basic - ( 09 Dec 2023 03:48 )    Color: x / Appearance: x / SG: x / pH: x  Gluc: 119 mg/dL / Ketone: x  / Bili: x / Urobili: x   Blood: x / Protein: x / Nitrite: x   Leuk Esterase: x / RBC: x / WBC x   Sq Epi: x / Non Sq Epi: x / Bacteria: x      CAPILLARY BLOOD GLUCOSE      POCT Blood Glucose.: 130 mg/dL (09 Dec 2023 06:31)    LIVER FUNCTIONS - ( 09 Dec 2023 03:48 )  Alb: 2.1 g/dL / Pro: 7.4 g/dL / ALK PHOS: 115 U/L / ALT: 62 U/L / AST: 104 U/L / GGT: x             RADIOLOGY & ADDITIONAL STUDIES:   SUBJECTIVE:  PT seen and evaluated this AM on rounds. Endorses feeling well o/n. Tolerating PO intake. Denies nausea/vomiting     MEDICATIONS  (STANDING):  chlorhexidine 2% Cloths 1 Application(s) Topical <User Schedule>  cholestyramine Powder (Sugar-Free) 4 Gram(s) Oral daily  epoetin matt-epbx (RETACRIT) Injectable 75650 Unit(s) SubCutaneous every 7 days  ergocalciferol 84992 Unit(s) Oral <User Schedule>  glucagon  Injectable 1 milliGRAM(s) IntraMuscular once  heparin   Injectable 5000 Unit(s) SubCutaneous every 8 hours  levothyroxine Injectable 30 MICROGram(s) IV Push at bedtime  lipid, fat emulsion (Fish Oil and Plant Based) 20% Infusion 0.54 Gm/kG/Day (20.83 mL/Hr) IV Continuous <Continuous>  metoprolol tartrate Injectable 5 milliGRAM(s) IV Push every 6 hours  Parenteral Nutrition - Adult 1 Each TPN Continuous <Continuous>  ursodiol Suspension 300 milliGRAM(s) Oral every 8 hours  venlafaxine XR. 150 milliGRAM(s) Oral daily    MEDICATIONS  (PRN):  lidocaine 2% Viscous 10 milliLiter(s) Swish and Spit every 12 hours PRN tongue pain  LORazepam   Injectable 0.5 milliGRAM(s) IV Push at bedtime PRN Anxiety  ondansetron Injectable 4 milliGRAM(s) IV Push every 6 hours PRN Nausea and/or Vomiting      Vital Signs Last 24 Hrs  T(C): 36.4 (09 Dec 2023 05:15), Max: 37.1 (08 Dec 2023 18:01)  T(F): 97.6 (09 Dec 2023 05:15), Max: 98.7 (08 Dec 2023 18:01)  HR: 92 (09 Dec 2023 08:00) (87 - 98)  BP: 134/62 (09 Dec 2023 08:00) (99/61 - 144/65)  BP(mean): 89 (09 Dec 2023 08:00) (74 - 93)  RR: 26 (09 Dec 2023 08:00) (21 - 31)  SpO2: 93% (09 Dec 2023 08:00) (92% - 96%)    Parameters below as of 09 Dec 2023 08:00  Patient On (Oxygen Delivery Method): room air        Physical Exam:  General: NAD, resting comfortably in bed  Pulmonary: Nonlabored breathing, no respiratory distress  Cardiovascular: NSR  Abdominal: soft, NT/ND  Extremities: WWP, normal strength  Neuro: A/O x 3, CNs II-XII grossly intact, no focal deficits    I&O's Summary    08 Dec 2023 07:01  -  09 Dec 2023 07:00  --------------------------------------------------------  IN: 3237 mL / OUT: 2665 mL / NET: 572 mL    09 Dec 2023 07:01  -  09 Dec 2023 08:38  --------------------------------------------------------  IN: 99 mL / OUT: 100 mL / NET: -1 mL        LABS:                        7.5    8.82  )-----------( 189      ( 09 Dec 2023 03:48 )             23.3     12-09    133<L>  |  99  |  46<H>  ----------------------------<  119<H>  4.5   |  26  |  2.74<H>    Ca    8.0<L>      09 Dec 2023 03:48  Phos  4.0     12-09  Mg     2.3     12-09    TPro  7.4  /  Alb  2.1<L>  /  TBili  6.3<H>  /  DBili  4.5<H>  /  AST  104<H>  /  ALT  62<H>  /  AlkPhos  115  12-09      Urinalysis Basic - ( 09 Dec 2023 03:48 )    Color: x / Appearance: x / SG: x / pH: x  Gluc: 119 mg/dL / Ketone: x  / Bili: x / Urobili: x   Blood: x / Protein: x / Nitrite: x   Leuk Esterase: x / RBC: x / WBC x   Sq Epi: x / Non Sq Epi: x / Bacteria: x      CAPILLARY BLOOD GLUCOSE      POCT Blood Glucose.: 130 mg/dL (09 Dec 2023 06:31)    LIVER FUNCTIONS - ( 09 Dec 2023 03:48 )  Alb: 2.1 g/dL / Pro: 7.4 g/dL / ALK PHOS: 115 U/L / ALT: 62 U/L / AST: 104 U/L / GGT: x             RADIOLOGY & ADDITIONAL STUDIES:   SUBJECTIVE:  PT seen and evaluated this AM on rounds. Endorses feeling well o/n. Tolerating PO intake. Denies nausea/vomiting     MEDICATIONS  (STANDING):  chlorhexidine 2% Cloths 1 Application(s) Topical <User Schedule>  cholestyramine Powder (Sugar-Free) 4 Gram(s) Oral daily  epoetin matt-epbx (RETACRIT) Injectable 87663 Unit(s) SubCutaneous every 7 days  ergocalciferol 07496 Unit(s) Oral <User Schedule>  glucagon  Injectable 1 milliGRAM(s) IntraMuscular once  heparin   Injectable 5000 Unit(s) SubCutaneous every 8 hours  levothyroxine Injectable 30 MICROGram(s) IV Push at bedtime  lipid, fat emulsion (Fish Oil and Plant Based) 20% Infusion 0.54 Gm/kG/Day (20.83 mL/Hr) IV Continuous <Continuous>  metoprolol tartrate Injectable 5 milliGRAM(s) IV Push every 6 hours  Parenteral Nutrition - Adult 1 Each TPN Continuous <Continuous>  ursodiol Suspension 300 milliGRAM(s) Oral every 8 hours  venlafaxine XR. 150 milliGRAM(s) Oral daily    MEDICATIONS  (PRN):  lidocaine 2% Viscous 10 milliLiter(s) Swish and Spit every 12 hours PRN tongue pain  LORazepam   Injectable 0.5 milliGRAM(s) IV Push at bedtime PRN Anxiety  ondansetron Injectable 4 milliGRAM(s) IV Push every 6 hours PRN Nausea and/or Vomiting      Vital Signs Last 24 Hrs  T(C): 36.4 (09 Dec 2023 05:15), Max: 37.1 (08 Dec 2023 18:01)  T(F): 97.6 (09 Dec 2023 05:15), Max: 98.7 (08 Dec 2023 18:01)  HR: 92 (09 Dec 2023 08:00) (87 - 98)  BP: 134/62 (09 Dec 2023 08:00) (99/61 - 144/65)  BP(mean): 89 (09 Dec 2023 08:00) (74 - 93)  RR: 26 (09 Dec 2023 08:00) (21 - 31)  SpO2: 93% (09 Dec 2023 08:00) (92% - 96%)    Parameters below as of 09 Dec 2023 08:00  Patient On (Oxygen Delivery Method): room air        Physical Exam:  General: NAD, resting comfortably in bed  Pulmonary: Nonlabored breathing, no respiratory distress  Cardiovascular: NSR  Abdominal: soft, NT/ND  Extremities: WWP, normal strength  Neuro: A/O x 3, CNs II-XII grossly intact, no focal deficits    I&O's Summary    08 Dec 2023 07:01  -  09 Dec 2023 07:00  --------------------------------------------------------  IN: 3237 mL / OUT: 2665 mL / NET: 572 mL    09 Dec 2023 07:01  -  09 Dec 2023 08:38  --------------------------------------------------------  IN: 99 mL / OUT: 100 mL / NET: -1 mL        LABS:                        7.5    8.82  )-----------( 189      ( 09 Dec 2023 03:48 )             23.3     12-09    133<L>  |  99  |  46<H>  ----------------------------<  119<H>  4.5   |  26  |  2.74<H>    Ca    8.0<L>      09 Dec 2023 03:48  Phos  4.0     12-09  Mg     2.3     12-09    TPro  7.4  /  Alb  2.1<L>  /  TBili  6.3<H>  /  DBili  4.5<H>  /  AST  104<H>  /  ALT  62<H>  /  AlkPhos  115  12-09      Urinalysis Basic - ( 09 Dec 2023 03:48 )    Color: x / Appearance: x / SG: x / pH: x  Gluc: 119 mg/dL / Ketone: x  / Bili: x / Urobili: x   Blood: x / Protein: x / Nitrite: x   Leuk Esterase: x / RBC: x / WBC x   Sq Epi: x / Non Sq Epi: x / Bacteria: x      CAPILLARY BLOOD GLUCOSE      POCT Blood Glucose.: 130 mg/dL (09 Dec 2023 06:31)    LIVER FUNCTIONS - ( 09 Dec 2023 03:48 )  Alb: 2.1 g/dL / Pro: 7.4 g/dL / ALK PHOS: 115 U/L / ALT: 62 U/L / AST: 104 U/L / GGT: x             RADIOLOGY & ADDITIONAL STUDIES:

## 2023-12-09 NOTE — PROGRESS NOTE ADULT - SUBJECTIVE AND OBJECTIVE BOX
Interval: No acute events overnight. Wound manager patched over night for some leaking.   Hb: 7.5, awaiting transfusion     SUBJECTIVE:  No complaints at the moment, enjoying a muffin while watching TV. No pain, nausea, vomiting, fevers or chills endorsed.     MEDICATIONS  (STANDING):  chlorhexidine 2% Cloths 1 Application(s) Topical <User Schedule>  cholestyramine Powder (Sugar-Free) 4 Gram(s) Oral daily  epoetin matt-epbx (RETACRIT) Injectable 05672 Unit(s) SubCutaneous every 7 days  ergocalciferol 77251 Unit(s) Oral <User Schedule>  glucagon  Injectable 1 milliGRAM(s) IntraMuscular once  heparin   Injectable 5000 Unit(s) SubCutaneous every 8 hours  levothyroxine Injectable 30 MICROGram(s) IV Push at bedtime  lipid, fat emulsion (Fish Oil and Plant Based) 20% Infusion 0.54 Gm/kG/Day (20.83 mL/Hr) IV Continuous <Continuous>  metoprolol tartrate Injectable 5 milliGRAM(s) IV Push every 6 hours  Parenteral Nutrition - Adult 1 Each TPN Continuous <Continuous>  Parenteral Nutrition - Adult 1 Each TPN Continuous <Continuous>  ursodiol Suspension 300 milliGRAM(s) Oral every 8 hours  venlafaxine XR. 150 milliGRAM(s) Oral daily    MEDICATIONS  (PRN):  lidocaine 2% Viscous 10 milliLiter(s) Swish and Spit every 12 hours PRN tongue pain  LORazepam   Injectable 0.5 milliGRAM(s) IV Push at bedtime PRN Anxiety  ondansetron Injectable 4 milliGRAM(s) IV Push every 6 hours PRN Nausea and/or Vomiting      Vital Signs Last 24 Hrs  T(C): 36.4 (09 Dec 2023 05:15), Max: 37.1 (08 Dec 2023 18:01)  T(F): 97.6 (09 Dec 2023 05:15), Max: 98.7 (08 Dec 2023 18:01)  HR: 90 (09 Dec 2023 10:00) (87 - 98)  BP: 125/60 (09 Dec 2023 10:00) (99/61 - 144/65)  BP(mean): 84 (09 Dec 2023 10:00) (74 - 93)  RR: 35 (09 Dec 2023 10:00) (21 - 35)  SpO2: 92% (09 Dec 2023 10:00) (92% - 96%)    Parameters below as of 09 Dec 2023 10:00  Patient On (Oxygen Delivery Method): room air        Physical Exam:  General: NAD, resting comfortably in bed  Pulmonary: Nonlabored breathing, no respiratory distress  Cardiovascular: NSR  Abdominal: soft, NT/ND  Extremities: WWP, normal strength  Neuro: A/O x 3, CNs II-XII grossly intact, no focal deficits    I&O's Summary    08 Dec 2023 07:01  -  09 Dec 2023 07:00  --------------------------------------------------------  IN: 3237 mL / OUT: 2665 mL / NET: 572 mL    09 Dec 2023 07:01  -  09 Dec 2023 10:59  --------------------------------------------------------  IN: 378 mL / OUT: 150 mL / NET: 228 mL    LABS:                        7.5    8.82  )-----------( 189      ( 09 Dec 2023 03:48 )             23.3     12-09    133<L>  |  99  |  46<H>  ----------------------------<  119<H>  4.5   |  26  |  2.74<H>    Ca    8.0<L>      09 Dec 2023 03:48  Phos  4.0     12-09  Mg     2.3     12-09    TPro  7.4  /  Alb  2.1<L>  /  TBili  6.3<H>  /  DBili  4.5<H>  /  AST  104<H>  /  ALT  62<H>  /  AlkPhos  115  12-09      Urinalysis Basic - ( 09 Dec 2023 03:48 )    Color: x / Appearance: x / SG: x / pH: x  Gluc: 119 mg/dL / Ketone: x  / Bili: x / Urobili: x   Blood: x / Protein: x / Nitrite: x   Leuk Esterase: x / RBC: x / WBC x   Sq Epi: x / Non Sq Epi: x / Bacteria: x      CAPILLARY BLOOD GLUCOSE      POCT Blood Glucose.: 130 mg/dL (09 Dec 2023 06:31)    LIVER FUNCTIONS - ( 09 Dec 2023 03:48 )  Alb: 2.1 g/dL / Pro: 7.4 g/dL / ALK PHOS: 115 U/L / ALT: 62 U/L / AST: 104 U/L / GGT: x             RADIOLOGY & ADDITIONAL STUDIES:   Interval: No acute events overnight. Wound manager patched over night for some leaking.   Hb: 7.5, awaiting transfusion     SUBJECTIVE:  No complaints at the moment, enjoying a muffin while watching TV. No pain, nausea, vomiting, fevers or chills endorsed.     MEDICATIONS  (STANDING):  chlorhexidine 2% Cloths 1 Application(s) Topical <User Schedule>  cholestyramine Powder (Sugar-Free) 4 Gram(s) Oral daily  epoetin matt-epbx (RETACRIT) Injectable 90367 Unit(s) SubCutaneous every 7 days  ergocalciferol 05459 Unit(s) Oral <User Schedule>  glucagon  Injectable 1 milliGRAM(s) IntraMuscular once  heparin   Injectable 5000 Unit(s) SubCutaneous every 8 hours  levothyroxine Injectable 30 MICROGram(s) IV Push at bedtime  lipid, fat emulsion (Fish Oil and Plant Based) 20% Infusion 0.54 Gm/kG/Day (20.83 mL/Hr) IV Continuous <Continuous>  metoprolol tartrate Injectable 5 milliGRAM(s) IV Push every 6 hours  Parenteral Nutrition - Adult 1 Each TPN Continuous <Continuous>  Parenteral Nutrition - Adult 1 Each TPN Continuous <Continuous>  ursodiol Suspension 300 milliGRAM(s) Oral every 8 hours  venlafaxine XR. 150 milliGRAM(s) Oral daily    MEDICATIONS  (PRN):  lidocaine 2% Viscous 10 milliLiter(s) Swish and Spit every 12 hours PRN tongue pain  LORazepam   Injectable 0.5 milliGRAM(s) IV Push at bedtime PRN Anxiety  ondansetron Injectable 4 milliGRAM(s) IV Push every 6 hours PRN Nausea and/or Vomiting      Vital Signs Last 24 Hrs  T(C): 36.4 (09 Dec 2023 05:15), Max: 37.1 (08 Dec 2023 18:01)  T(F): 97.6 (09 Dec 2023 05:15), Max: 98.7 (08 Dec 2023 18:01)  HR: 90 (09 Dec 2023 10:00) (87 - 98)  BP: 125/60 (09 Dec 2023 10:00) (99/61 - 144/65)  BP(mean): 84 (09 Dec 2023 10:00) (74 - 93)  RR: 35 (09 Dec 2023 10:00) (21 - 35)  SpO2: 92% (09 Dec 2023 10:00) (92% - 96%)    Parameters below as of 09 Dec 2023 10:00  Patient On (Oxygen Delivery Method): room air        Physical Exam:  General: NAD, resting comfortably in bed  Pulmonary: Nonlabored breathing, no respiratory distress  Cardiovascular: NSR  Abdominal: soft, NT/ND  Extremities: WWP, normal strength  Neuro: A/O x 3, CNs II-XII grossly intact, no focal deficits    I&O's Summary    08 Dec 2023 07:01  -  09 Dec 2023 07:00  --------------------------------------------------------  IN: 3237 mL / OUT: 2665 mL / NET: 572 mL    09 Dec 2023 07:01  -  09 Dec 2023 10:59  --------------------------------------------------------  IN: 378 mL / OUT: 150 mL / NET: 228 mL    LABS:                        7.5    8.82  )-----------( 189      ( 09 Dec 2023 03:48 )             23.3     12-09    133<L>  |  99  |  46<H>  ----------------------------<  119<H>  4.5   |  26  |  2.74<H>    Ca    8.0<L>      09 Dec 2023 03:48  Phos  4.0     12-09  Mg     2.3     12-09    TPro  7.4  /  Alb  2.1<L>  /  TBili  6.3<H>  /  DBili  4.5<H>  /  AST  104<H>  /  ALT  62<H>  /  AlkPhos  115  12-09      Urinalysis Basic - ( 09 Dec 2023 03:48 )    Color: x / Appearance: x / SG: x / pH: x  Gluc: 119 mg/dL / Ketone: x  / Bili: x / Urobili: x   Blood: x / Protein: x / Nitrite: x   Leuk Esterase: x / RBC: x / WBC x   Sq Epi: x / Non Sq Epi: x / Bacteria: x      CAPILLARY BLOOD GLUCOSE      POCT Blood Glucose.: 130 mg/dL (09 Dec 2023 06:31)    LIVER FUNCTIONS - ( 09 Dec 2023 03:48 )  Alb: 2.1 g/dL / Pro: 7.4 g/dL / ALK PHOS: 115 U/L / ALT: 62 U/L / AST: 104 U/L / GGT: x             RADIOLOGY & ADDITIONAL STUDIES:

## 2023-12-09 NOTE — PROGRESS NOTE ADULT - SUBJECTIVE AND OBJECTIVE BOX
Coverage for Dr Peterson  Without complaints. VSS  Dressing intact  Stable  Consider transfusion for decreasing hct

## 2023-12-09 NOTE — PROGRESS NOTE ADULT - ASSESSMENT
78M w PMH Crohn's, AFib/Flutter s/p DCCVs on amiodarone, remote ileocectomy and open appendectomy. Admitted (6/23) for SBO vs Crohns flare, s/p NGT decompression and s/p lap converted to open TRE, SBR x 3, left in discontinuity with abthera vac on (6/27), RTOR for ileocolic resection, small bowel anastomosis, and abdominal wall closure on (6/28), c/b fluid collection s/p IR aspiration of perihepatic fluid on (7/3), c/b wound dehiscence s/p RTOR exlap, washout, ileocolic resection with end ileostomy, blow hole colostomy, red rubber from ileostomy to small bowel anastomosis; vicryl bridging mesh on (7/5) transferred to SICU postoperatively for hemodynamic monitoring, with hospital course complicated by periods of severe ELVI and hyponatremia, which resolved but stepped back up for to SICU on (9/10) for acute AMS, intermittent hypoglycemia, AFib with RVR. Percutaneous Cholecystostomy placed on (9/11) for enlarged GB, PCT capped 10/5 and uncapped 10/25 for hyperbilirubinemia, Right brachial DVT, left basillic and cephalic superficial thrombus on duplex 11/2, CT 11/14 with ALDEN ground glass opacity of unclear etiology, completed empiric 7day cefepime course 11/22, rising T-bili of unclear etilogy, now w resolved candida glabrata fungemia, ELVI.    NEURO: AMS resolved, likely septic encephalopathy, EEG neg. Hx of depression - cont Effexor. Nausea: Zofran PRN. Anxiety: Lorazepam PRN.  CV: Hx Afib/flutter: s/p DCCV, Amio stopped due to persistent transaminitis. Continue Metoprolol 5q6 with hold parameters. Hx HLD: Lipitor held given high LFTs. TTE  (7/18) - PASP 64mmHg, EF 65-70%, Hx HTN - stopped PO Norvasc as unlikely absorbing, holding Losartan. BP borderline - continue holding hydralazine.   PULM: CT from 11/14 with ALDEN ground glass opacity of unclear etiology. COVID negative. RVP negative. completed 7d empiric cefepime 11/22 to cover for potential PNA. Dyspnea improved.  GI/FEN: Low res, low fat, high protein diet. Cont Ensure max x1/day. fungemia likely CLABSI. PICC replaced on 12/4 and continue TPN qd, lipids MWF.  High output EC fistula: cont Octreotide & Cholestyramine 10/18. Transaminitis elevated T bili of unclear origin, consulted Team 1. GI deferring decision pending further evaluation by hepatology team. s/p Perc Deb on 9/11; MRCP 10/30 no obstruction, seen by Hepatology started on ursodiol, RUQ US 11/25 CBD 5mm, hepatic vessels patent Repeat. MRCP neg. Monitor drainage from fistula, bolus as needed to keep I&O even. Ensure once daily with LPS. Folate, thiamine.  : Voids. ELVI, Cr rising today. d/c famotidine and half Effecor dose given renal dysfunction. Caspo unlikely etiology of ELVI per ID. s/p brief bicarb gtt.  MARLO Duplex and RP US unremarkable, CK wnl.  f/u renal recs pending glomerulonephritis labs.  ENDO: Hx hypothyroid: IV Synthroid, TSH low on 11/30, decreased synthroid dose, recheck TSH 12/6 wnl.  ID: BCx 11/22 grew candida glabrata, likely CLABSI. ID consulted, s/p Caspo, switched to Fluconazole (12/1-). Repeat surveillance blood cx NGTD. Ophthalmology evaulated - normal ocular exam. Echo no vegetation. PICC replaced on 12/4.  Recent MRSA swab negative, RVP panel negative, COVID negative. Cont Nystatin powder // PREVIOUSLY  caspofungin (11/24-12/1). completed empiric 7days cefepime (11/15-11/22), had C. tertium, Lactobacillis from his IR cx 7/3, and candida albicans, lactobacillus, vanc sensitive E faecium, vanc resistant E gallinarum, vanc resistant E casseliflavus, lactobacillus from his OR cx 7/5. Completed course of abx with Imipenem (9/10-9/15). Imipenem (8/26--) imipenem (6/30-7/12, 7/23-7/30), Dapto (6/30-7/5 and 7/23-7/24). Empiric dapto (8/23-24) and cefepime (8/23-24).   PPX: SCDs, SQH, was on Therapeutic lovenox bid for R brachial vein DVT, but will hold off on hep gtt since repeat US Duplex negative.  HEME: Anemia - continue Epogen weekly. S/p Iron Sucrose 200 qd x 3 days for chronic anemia. Hb 7.5 12/9 - Will transfuse 1 unit.   LINES: PIVs, LUE PICC (12/4 - ) // DC: LUE PICC (9/1-11/1 ), RUE PICC: (11/1-11/24)   WOUNDS/DRAINS: Abdominal wound and fistula with wound manager in place dressing change Tuesday and Friday. RLQ Ostomy. IR perc deb tube. // DC: L Clay drain.  PT: Ambulate as tolerated   DISPO: SICU due to complicated hospital course.

## 2023-12-09 NOTE — PROGRESS NOTE ADULT - SUBJECTIVE AND OBJECTIVE BOX
No complaints this am  VS stable Afeb Exam stable  Bili/LFTs trending down  Hct decreased  Creatinine stable

## 2023-12-09 NOTE — PROGRESS NOTE ADULT - ASSESSMENT
78M w PMH Crohn's, AFib/Flutter s/p DCCVs on amiodarone, remote ileocectomy and open appendectomy. Admitted (6/23) for SBO vs Crohns flare, s/p NGT decompression and s/p lap converted to open TRE, SBR x 3, left in discontinuity with abthera vac on (6/27), RTOR for ileocolic resection, small bowel anastomosis, and abdominal wall closure on (6/28), c/b fluid collection s/p IR aspiration of perihepatic fluid on (7/3), c/b wound dehiscence s/p RTOR exlap, washout, ileocolic resection with end ileostomy, blow hole colostomy, red rubber from ileostomy to small bowel anastomosis; vicryl bridging mesh on (7/5) transferred to SICU postoperatively for hemodynamic monitoring, with hospital course complicated by periods of severe ELVI and hyponatremia, which resolved but stepped back up for to SICU on (9/10) for acute AMS, intermittent hypoglycemia, AFib with RVR. Percutaneous Cholecystostomy placed on (9/11) for enlarged GB, PCT capped 10/5 and uncapped 10/25 for hyperbilirubinemia, Right brachial DVT, left basillic and cephalic superficial thrombus on duplex 11/2, CT 11/14 with ALDEN ground glass opacity of unclear etiology, completed empiric 7day cefepime course 11/22, rising T-bili of unclear etilogy, now w resolved candida glabrata fungemia, ELVI    1uPRBC given pt Hgb 7.9 and endorses SOB/symptoms of anemia   care per SICU

## 2023-12-10 NOTE — PROGRESS NOTE ADULT - ASSESSMENT
79 yo M with prolonged hospital course, initially  admitted for SBO and CD flare s/p multiple post op complications now with recurrent  non-oliguric sATN 2/2 candidemia , with initial improvement however ,last 48hrs with relative hypotension and perfusion shifts with sCr now at 2.8, as  likely rerfusional related rise. 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 Maintain MAP>70 for adequate renal perfusion. If Cr worsens despite hemodynamic optimization, then may need bx given MGUS on serology, may have possible cast nephropathy   - Maintain slight pos FB   - Encourage PO intake. Also on parenteral nutrition  - Daily BMP  - Maintain MAP >65 for renal perfusion  - Strict I&O, daily  77 yo M with prolonged hospital course, initially  admitted for SBO and CD flare s/p multiple post op complications now with recurrent  non-oliguric sATN 2/2 candidemia , with initial improvement however ,last 48hrs with relative hypotension and perfusion shifts with sCr now at 2.8, as  likely rerfusional related rise. 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 Maintain MAP>70 for adequate renal perfusion. If Cr worsens despite hemodynamic optimization, then may need bx given MGUS on serology, may have possible cast nephropathy   - Maintain slight pos FB   - Encourage PO intake. Also on parenteral nutrition  - Daily BMP  - Maintain MAP >65 for renal perfusion  - Strict I&O, daily  79 yo M with prolonged hospital course, initially  admitted for SBO and CD flare s/p multiple post op complications now with recurrent  non-oliguric sATN 2/2 candidemia , with initial improvement however ,last 48hrs with relative hypotension and perfusion shifts with sCr now at 2.8, as  likely reperfusional related rise. 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 Maintain MAP>70 for adequate renal perfusion. If Cr worsens despite hemodynamic optimization, then may need bx given MGUS on serology, may have possible cast nephropathy   - Maintain slight pos FB   - Encourage PO intake. Also on parenteral nutrition  - Daily BMP  - Maintain MAP >65 for renal perfusion  - Strict I&O, daily

## 2023-12-10 NOTE — PROGRESS NOTE ADULT - ATTENDING COMMENTS
77 yo M with prolonged hospital course, initially  admitted for SBO and CD flare s/p multiple post op complications now with recurrent  non-oliguric sATN 2/2 candidemia. ELVI w/ SCr at 2.7 (unchanged)  Will continue with renal function monitoring at present  C/w UOP monitoring  Depending on clinical course will determine next steps in management  Discussed w/ primary team 79 yo M with prolonged hospital course, initially  admitted for SBO and CD flare s/p multiple post op complications now with recurrent  non-oliguric sATN 2/2 candidemia. ELVI w/ SCr at 2.7 (unchanged)  Will continue with renal function monitoring at present  C/w UOP monitoring  Depending on clinical course will determine next steps in management  Discussed w/ primary team

## 2023-12-10 NOTE — PROGRESS NOTE ADULT - SUBJECTIVE AND OBJECTIVE BOX
Coverage for Dr Peterson  WIthout complaints  Afeb. VSS    Abdomen- soft, NT, ND  Wound-  pouching system in tact    Stable  HG- 8.5 after 1 unit.  Bilirubin- increased

## 2023-12-10 NOTE — PROGRESS NOTE ADULT - ASSESSMENT
78M w PMH Crohn's, AFib/Flutter s/p DCCVs on amiodarone, remote ileocectomy and open appendectomy. Admitted (6/23) for SBO vs Crohns flare, s/p NGT decompression and s/p lap converted to open TRE, SBR x 3, left in discontinuity with abthera vac on (6/27), RTOR for ileocolic resection, small bowel anastomosis, and abdominal wall closure on (6/28), c/b fluid collection s/p IR aspiration of perihepatic fluid on (7/3), c/b wound dehiscence s/p RTOR exlap, washout, ileocolic resection with end ileostomy, blow hole colostomy, red rubber from ileostomy to small bowel anastomosis; vicryl bridging mesh on (7/5) transferred to SICU postoperatively for hemodynamic monitoring, with hospital course complicated by periods of severe ELVI and hyponatremia, which resolved but stepped back up for to SICU on (9/10) for acute AMS, intermittent hypoglycemia, AFib with RVR. Percutaneous Cholecystostomy placed on (9/11) for enlarged GB, PCT capped 10/5 and uncapped 10/25 for hyperbilirubinemia, Right brachial DVT, left basillic and cephalic superficial thrombus on duplex 11/2, CT 11/14 with ALDEN ground glass opacity of unclear etiology, completed empiric 7day cefepime course 11/22, rising T-bili of unclear etilogy, now w resolved candida glabrata fungemia, ELVI    care per SICU

## 2023-12-10 NOTE — PROGRESS NOTE ADULT - ASSESSMENT
78M w PMH Crohn's, AFib/Flutter s/p DCCVs on amiodarone, remote ileocectomy and open appendectomy. Admitted (6/23) for SBO vs Crohns flare, s/p NGT decompression and s/p lap converted to open TRE, SBR x 3, left in discontinuity with abthera vac on (6/27), RTOR for ileocolic resection, small bowel anastomosis, and abdominal wall closure on (6/28), c/b fluid collection s/p IR aspiration of perihepatic fluid on (7/3), c/b wound dehiscence s/p RTOR exlap, washout, ileocolic resection with end ileostomy, blow hole colostomy, red rubber from ileostomy to small bowel anastomosis; vicryl bridging mesh on (7/5) transferred to SICU postoperatively for hemodynamic monitoring, with hospital course complicated by periods of severe ELVI and hyponatremia, which resolved but stepped back up for to SICU on (9/10) for acute AMS, intermittent hypoglycemia, AFib with RVR. Percutaneous Cholecystostomy placed on (9/11) for enlarged GB, PCT capped 10/5 and uncapped 10/25 for hyperbilirubinemia, Right brachial DVT, left basillic and cephalic superficial thrombus on duplex 11/2, CT 11/14 with ALDEN ground glass opacity of unclear etiology, completed empiric 7day cefepime course 11/22, rising T-bili of unclear etilogy, now w resolved candida glabrata fungemia, ELVI    NEURO: AMS resolved, likely septic encephalopathy, EEG neg. Hx of depression - cont Effexor. Nausea: Zofran PRN. Anxiety: Lorazepam PRN.  CV: Hx Afib/flutter: s/p DCCV, Amio stopped due to persistent transaminitis. Continue Metoprolol 5q6 with hold parameters. Hx HLD: Lipitor held given high LFTs. TTE  (7/18) - PASP 64mmHg, EF 65-70%, Hx HTN - stopped PO Norvasc as unlikely absorbing, holding Losartan. BP borderline - continue holding hydralazine.   PULM: CT from 11/14 with ALDEN ground glass opacity of unclear etiology. COVID negative. RVP negative. completed 7d empiric cefepime 11/22 to cover for potential PNA. Dyspnea improved.  GI/FEN: Low res, low fat, high protein diet. Cont Ensure max x1/day. fungemia likely CLABSI. PICC replaced on 12/4 and continue TPN qd, lipids MWF.  High output EC fistula: cont Octreotide & Cholestyramine 10/18. Transaminitis elevated T bili of unclear origin, consulted Team 1. GI deferring decision pending further evaluation by hepatology team. s/p Perc Deb on 9/11; MRCP 10/30 no obstruction, seen by Hepatology started on ursodiol, RUQ US 11/25 CBD 5mm, hepatic vessels patent Repeat. MRCP neg. Monitor drainage from fistula, bolus as needed to keep I&O even. Ensure once daily with LPS. Folate, thiamine.  : Voids. ELVI, Cr rising today. d/c famotidine and half Effecor dose given renal dysfunction. Caspo unlikely etiology of ELVI per ID. s/p brief bicarb gtt.  MARLO Duplex and RP US unremarkable, CK wnl.  f/u renal recs pending glomerulonephritis labs.  ENDO: Hx hypothyroid: IV Synthroid, TSH low on 11/30, decreased synthroid dose, recheck TSH 12/6 wnl.  ID: BCx 11/22 grew candida glabrata, likely CLABSI. ID consulted, s/p Caspo, switched to Fluconazole (12/1-12/9). Repeat surveillance blood cx NGTD. Ophthalmology evaulated - normal ocular exam. Echo no vegetation. PICC replaced on 12/4.  Recent MRSA swab negative, RVP panel negative, COVID negative. Cont Nystatin powder // PREVIOUSLY  caspofungin (11/24-12/1). completed empiric 7days cefepime (11/15-11/22), had C. tertium, Lactobacillis from his IR cx 7/3, and candida albicans, lactobacillus, vanc sensitive E faecium, vanc resistant E gallinarum, vanc resistant E casseliflavus, lactobacillus from his OR cx 7/5. Completed course of abx with Imipenem (9/10-9/15). Imipenem (8/26--) imipenem (6/30-7/12, 7/23-7/30), Dapto (6/30-7/5 and 7/23-7/24). Empiric dapto (8/23-24) and cefepime (8/23-24).   PPX: SCDs, SQH, was on Therapeutic lovenox bid for R brachial vein DVT, but will hold off on hep gtt since repeat US Duplex negative.  HEME: Anemia - continue Epogen weekly. S/p Iron Sucrose 200 qd x 3 days for chronic anemia.  LINES: PIVs, LUE PICC (12/4 - ) // DC: LUE PICC (9/1-11/1 ), RUE PICC: (11/1-11/24)   WOUNDS/DRAINS: Abdominal wound and fistula with wound manager in place dressing change Tuesday and Friday. RLQ Ostomy. IR perc deb tube. // DC: L Clay drain.  PT: Ambulate as tolerated   DISPO: SICU due to complicated hospital course.

## 2023-12-10 NOTE — PROGRESS NOTE ADULT - SUBJECTIVE AND OBJECTIVE BOX
STATUS POST:  Exploratory laparotomy    Laparoscopic lysis of intestinal adhesions    Exploratory laparotomy    Open lysis of intestinal adhesions    Resection of small bowel    Temporary closure of abdominal cavity    Repair, anastomosis, ileocolic    Small bowel resection with anastomosis    Flap, myocutaneous, abdominal wall    Reconstruction of abdominal wall using mesh    Washout of abdominal cavity    Creation of ileostomy    Creation of colostomy    Laparoscopic lysis of intestinal adhesions    Open lysis of intestinal adhesions    Resection of small bowel    Temporary closure of abdominal cavity    Repair, anastomosis, ileocolic    Small bowel resection with anastomosis    Flap, myocutaneous, abdominal wall    Reconstruction of abdominal wall using mesh    Washout of abdominal cavity      SUBJECTIVE: Pt seen and examined at the bedside by surgical team. No acute complaints at this time, resting comfortably in bed, perc cony w/ bilious output, ileostomy w/ liquid stool    Vital Signs Last 24 Hrs  T(C): 37.9 (10 Dec 2023 09:00), Max: 37.9 (10 Dec 2023 09:00)  T(F): 100.2 (10 Dec 2023 09:00), Max: 100.2 (10 Dec 2023 09:00)  HR: 87 (10 Dec 2023 14:00) (86 - 90)  BP: 151/64 (10 Dec 2023 14:00) (115/58 - 181/78)  BP(mean): 92 (10 Dec 2023 14:00) (79 - 112)  RR: 22 (10 Dec 2023 14:00) (19 - 27)  SpO2: 94% (10 Dec 2023 14:00) (92% - 98%)    Parameters below as of 10 Dec 2023 14:00  Patient On (Oxygen Delivery Method): room air        I&O's Summary    09 Dec 2023 07:01  -  10 Dec 2023 07:00  --------------------------------------------------------  IN: 3022 mL / OUT: 3130 mL / NET: -108 mL    10 Dec 2023 07:01  -  10 Dec 2023 14:20  --------------------------------------------------------  IN: 595 mL / OUT: 795 mL / NET: -200 mL        Physical Exam:  General Appearance: Appears well, NAD  Pulmonary: Nonlabored breathing, no respiratory distress  Cardiovascular: NSR  Abdomen: Soft, nondisteded, dressings clean and dry and intact  Extremities: WWP, SCD's in place     LABS:                        8.5    7.70  )-----------( 197      ( 10 Dec 2023 05:49 )             26.8     12-10    134<L>  |  100  |  47<H>  ----------------------------<  112<H>  4.7   |  24  |  2.78<H>    Ca    8.2<L>      10 Dec 2023 05:49  Phos  3.6     12-10  Mg     2.4     12-10    TPro  8.0  /  Alb  2.2<L>  /  TBili  7.4<H>  /  DBili  5.2<H>  /  AST  120<H>  /  ALT  68<H>  /  AlkPhos  127<H>  12-10      Urinalysis Basic - ( 10 Dec 2023 05:49 )    Color: x / Appearance: x / SG: x / pH: x  Gluc: 112 mg/dL / Ketone: x  / Bili: x / Urobili: x   Blood: x / Protein: x / Nitrite: x   Leuk Esterase: x / RBC: x / WBC x   Sq Epi: x / Non Sq Epi: x / Bacteria: x

## 2023-12-10 NOTE — PROGRESS NOTE ADULT - SUBJECTIVE AND OBJECTIVE BOX
INTERVAL/OVERNIGHT EVENTS: 12/9: 1PRBC for hg 7.5, TPN ordered, Fluc completed. ON: AMRITA/ Hb: 8.5(7.5) post-transfusion.     SUBJECTIVE: Patient seen and examined bedside. Pt reports feeling tired this morning but has no other acute complaints. Denies chest pain, SOB, palpitations, abdominal pain, nausea, or vomiting. Tolerating diet. Calorie count in progress.     heparin   Injectable 5000 Unit(s) SubCutaneous every 8 hours  metoprolol tartrate Injectable 5 milliGRAM(s) IV Push every 6 hours      Vital Signs Last 24 Hrs  T(C): 37.9 (10 Dec 2023 09:00), Max: 37.9 (10 Dec 2023 09:00)  T(F): 100.2 (10 Dec 2023 09:00), Max: 100.2 (10 Dec 2023 09:00)  HR: 87 (10 Dec 2023 09:00) (86 - 90)  BP: 167/74 (10 Dec 2023 09:00) (121/58 - 181/78)  BP(mean): 106 (10 Dec 2023 09:00) (75 - 112)  RR: 26 (10 Dec 2023 09:00) (19 - 35)  SpO2: 96% (10 Dec 2023 09:00) (92% - 98%)    Parameters below as of 10 Dec 2023 09:00  Patient On (Oxygen Delivery Method): room air      I&O's Detail    09 Dec 2023 07:01  -  10 Dec 2023 07:00  --------------------------------------------------------  IN:    Oral Fluid: 720 mL    PRBCs (Packed Red Blood Cells): 300 mL    TPN (Total Parenteral Nutrition): 2002 mL  Total IN: 3022 mL    OUT:    Drain (mL): 45 mL    Drain (mL): 1040 mL    Drain (mL): 610 mL    Fat Emulsion (Fish Oil &amp; Plant Based) 20% Infusion: 0 mL    Voided (mL): 1435 mL  Total OUT: 3130 mL    Total NET: -108 mL      10 Dec 2023 07:01  -  10 Dec 2023 09:56  --------------------------------------------------------  IN:    TPN (Total Parenteral Nutrition): 99 mL  Total IN: 99 mL    OUT:    Drain (mL): 20 mL    Drain (mL): 175 mL    Voided (mL): 100 mL  Total OUT: 295 mL    Total NET: -196 mL        Physical Exam:  General: NAD, resting comfortably in bed  Pulmonary: Nonlabored breathing, no respiratory distress  Cardiovascular: NSR  Abdominal: soft, NT/ND  Extremities: WWP, normal strength  Neuro: A/O x 3, CNs II-XII grossly intact, no focal deficits        LABS:                        8.5    7.70  )-----------( 197      ( 10 Dec 2023 05:49 )             26.8     12-10    134<L>  |  100  |  47<H>  ----------------------------<  112<H>  4.7   |  24  |  2.78<H>    Ca    8.2<L>      10 Dec 2023 05:49  Phos  3.6     12-10  Mg     2.4     12-10    TPro  8.0  /  Alb  2.2<L>  /  TBili  7.4<H>  /  DBili  5.2<H>  /  AST  120<H>  /  ALT  68<H>  /  AlkPhos  127<H>  12-10      Urinalysis Basic - ( 10 Dec 2023 05:49 )    Color: x / Appearance: x / SG: x / pH: x  Gluc: 112 mg/dL / Ketone: x  / Bili: x / Urobili: x   Blood: x / Protein: x / Nitrite: x   Leuk Esterase: x / RBC: x / WBC x   Sq Epi: x / Non Sq Epi: x / Bacteria: x        RADIOLOGY & ADDITIONAL STUDIES:

## 2023-12-10 NOTE — PROGRESS NOTE ADULT - SUBJECTIVE AND OBJECTIVE BOX
Patient is a 78y Male seen and evaluated at bedside.       Meds:    chlorhexidine 2% Cloths 1 <User Schedule>  cholestyramine Powder (Sugar-Free) 4 daily  epoetin matt-epbx (RETACRIT) Injectable 15656 every 7 days  ergocalciferol 61167 <User Schedule>  glucagon  Injectable 1 once  heparin   Injectable 5000 every 8 hours  levothyroxine Injectable 30 at bedtime  lidocaine 2% Viscous 10 every 12 hours PRN  LORazepam   Injectable 0.5 at bedtime PRN  metoprolol tartrate Injectable 5 every 6 hours  ondansetron Injectable 4 every 6 hours PRN  Parenteral Nutrition - Adult 1 <Continuous>  Parenteral Nutrition - Adult 1 <Continuous>  ursodiol Suspension 300 every 8 hours  venlafaxine XR. 150 daily      T(C): , Max: 37.9 (12-10-23 @ 09:00)  T(F): , Max: 100.2 (12-10-23 @ 09:00)  HR: 87 (12-10-23 @ 14:00)  BP: 151/64 (12-10-23 @ 14:00)  BP(mean): 92 (12-10-23 @ 14:00)  RR: 22 (12-10-23 @ 14:00)  SpO2: 94% (12-10-23 @ 14:00)  Wt(kg): --    12-09 @ 07:01  -  12-10 @ 07:00  --------------------------------------------------------  IN: 3022 mL / OUT: 3130 mL / NET: -108 mL    12-10 @ 07:01  -  12-10 @ 14:22  --------------------------------------------------------  IN: 595 mL / OUT: 795 mL / NET: -200 mL          Review of Systems:  ROS negative except as per HPI      PHYSICAL EXAM:  Constitutional: NAD, resting comfortably in chair  HEENT: NCAT, EOMI  Respiratory: CTAB, no crackles, no respiratory distress  Cardiovascular: regular rate  Gastrointestinal: abdominal wound noted with conrado pouch, PCT noted  Extremities: 1+ LE edema -  improved  Neurological: Awake, alert, moving all extremities        LABS:                        8.5    7.70  )-----------( 197      ( 10 Dec 2023 05:49 )             26.8     12-10    134<L>  |  100  |  47<H>  ----------------------------<  112<H>  4.7   |  24  |  2.78<H>    Ca    8.2<L>      10 Dec 2023 05:49  Phos  3.6     12-10  Mg     2.4     12-10    TPro  8.0  /  Alb  2.2<L>  /  TBili  7.4<H>  /  DBili  5.2<H>  /  AST  120<H>  /  ALT  68<H>  /  AlkPhos  127<H>  12-10        Urinalysis Basic - ( 10 Dec 2023 05:49 )    Color: x / Appearance: x / SG: x / pH: x  Gluc: 112 mg/dL / Ketone: x  / Bili: x / Urobili: x   Blood: x / Protein: x / Nitrite: x   Leuk Esterase: x / RBC: x / WBC x   Sq Epi: x / Non Sq Epi: x / Bacteria: x            RADIOLOGY & ADDITIONAL STUDIES:           Patient is a 78y Male seen and evaluated at bedside.       Meds:    chlorhexidine 2% Cloths 1 <User Schedule>  cholestyramine Powder (Sugar-Free) 4 daily  epoetin matt-epbx (RETACRIT) Injectable 28435 every 7 days  ergocalciferol 04941 <User Schedule>  glucagon  Injectable 1 once  heparin   Injectable 5000 every 8 hours  levothyroxine Injectable 30 at bedtime  lidocaine 2% Viscous 10 every 12 hours PRN  LORazepam   Injectable 0.5 at bedtime PRN  metoprolol tartrate Injectable 5 every 6 hours  ondansetron Injectable 4 every 6 hours PRN  Parenteral Nutrition - Adult 1 <Continuous>  Parenteral Nutrition - Adult 1 <Continuous>  ursodiol Suspension 300 every 8 hours  venlafaxine XR. 150 daily      T(C): , Max: 37.9 (12-10-23 @ 09:00)  T(F): , Max: 100.2 (12-10-23 @ 09:00)  HR: 87 (12-10-23 @ 14:00)  BP: 151/64 (12-10-23 @ 14:00)  BP(mean): 92 (12-10-23 @ 14:00)  RR: 22 (12-10-23 @ 14:00)  SpO2: 94% (12-10-23 @ 14:00)  Wt(kg): --    12-09 @ 07:01  -  12-10 @ 07:00  --------------------------------------------------------  IN: 3022 mL / OUT: 3130 mL / NET: -108 mL    12-10 @ 07:01  -  12-10 @ 14:22  --------------------------------------------------------  IN: 595 mL / OUT: 795 mL / NET: -200 mL          Review of Systems:  ROS negative except as per HPI      PHYSICAL EXAM:  Constitutional: NAD, resting comfortably in chair  HEENT: NCAT, EOMI  Respiratory: CTAB, no crackles, no respiratory distress  Cardiovascular: regular rate  Gastrointestinal: abdominal wound noted with conrado pouch, PCT noted  Extremities: 1+ LE edema -  improved  Neurological: Awake, alert, moving all extremities        LABS:                        8.5    7.70  )-----------( 197      ( 10 Dec 2023 05:49 )             26.8     12-10    134<L>  |  100  |  47<H>  ----------------------------<  112<H>  4.7   |  24  |  2.78<H>    Ca    8.2<L>      10 Dec 2023 05:49  Phos  3.6     12-10  Mg     2.4     12-10    TPro  8.0  /  Alb  2.2<L>  /  TBili  7.4<H>  /  DBili  5.2<H>  /  AST  120<H>  /  ALT  68<H>  /  AlkPhos  127<H>  12-10        Urinalysis Basic - ( 10 Dec 2023 05:49 )    Color: x / Appearance: x / SG: x / pH: x  Gluc: 112 mg/dL / Ketone: x  / Bili: x / Urobili: x   Blood: x / Protein: x / Nitrite: x   Leuk Esterase: x / RBC: x / WBC x   Sq Epi: x / Non Sq Epi: x / Bacteria: x            RADIOLOGY & ADDITIONAL STUDIES:

## 2023-12-11 NOTE — PROGRESS NOTE ADULT - SUBJECTIVE AND OBJECTIVE BOX
SUBJECTIVE:  ON pt had rapid afib to 130s but converted w/out intervention. Pt endorses feeling well this AM and endorses mild nausea but no episodes of emesis. Tolerating PO intake.     MEDICATIONS  (STANDING):  chlorhexidine 2% Cloths 1 Application(s) Topical <User Schedule>  cholestyramine Powder (Sugar-Free) 4 Gram(s) Oral daily  epoetin matt-epbx (RETACRIT) Injectable 00762 Unit(s) SubCutaneous every 7 days  ergocalciferol 45069 Unit(s) Oral <User Schedule>  glucagon  Injectable 1 milliGRAM(s) IntraMuscular once  heparin   Injectable 5000 Unit(s) SubCutaneous every 8 hours  levothyroxine Injectable 30 MICROGram(s) IV Push at bedtime  metoprolol tartrate Injectable 5 milliGRAM(s) IV Push every 6 hours  Parenteral Nutrition - Adult 1 Each TPN Continuous <Continuous>  ursodiol Suspension 300 milliGRAM(s) Oral every 8 hours  venlafaxine XR. 150 milliGRAM(s) Oral daily    MEDICATIONS  (PRN):  hydrALAZINE Injectable 10 milliGRAM(s) IV Push every 6 hours PRN SBP >170  lidocaine 2% Viscous 10 milliLiter(s) Swish and Spit every 12 hours PRN tongue pain  LORazepam   Injectable 0.5 milliGRAM(s) IV Push at bedtime PRN Anxiety  ondansetron Injectable 4 milliGRAM(s) IV Push every 6 hours PRN Nausea and/or Vomiting      Vital Signs Last 24 Hrs  T(C): 36.9 (11 Dec 2023 05:34), Max: 37.9 (10 Dec 2023 09:00)  T(F): 98.4 (11 Dec 2023 05:34), Max: 100.2 (10 Dec 2023 09:00)  HR: 88 (11 Dec 2023 07:00) (87 - 88)  BP: 173/78 (11 Dec 2023 07:00) (115/58 - 182/79)  BP(mean): 112 (11 Dec 2023 07:00) (83 - 114)  RR: 22 (11 Dec 2023 07:00) (20 - 27)  SpO2: 95% (11 Dec 2023 07:00) (92% - 98%)    Parameters below as of 11 Dec 2023 07:00  Patient On (Oxygen Delivery Method): room air        Physical Exam:  General: NAD, resting comfortably in bed  Pulmonary: Nonlabored breathing, no respiratory distress  Cardiovascular: NSR  Abdominal: soft, NT/ND, eakins pouch has thick, bilious output   Extremities: WWP, normal strength  Neuro: A/O x 3, CNs II-XII grossly intact, no focal deficits    I&O's Summary    10 Dec 2023 07:01  -  11 Dec 2023 07:00  --------------------------------------------------------  IN: 3190 mL / OUT: 4445 mL / NET: -1255 mL        LABS:                        8.5    7.70  )-----------( 197      ( 10 Dec 2023 05:49 )             26.8     12-10    134<L>  |  100  |  47<H>  ----------------------------<  112<H>  4.7   |  24  |  2.78<H>    Ca    8.2<L>      10 Dec 2023 05:49  Phos  3.6     12-10  Mg     2.4     12-10    TPro  8.0  /  Alb  2.2<L>  /  TBili  7.4<H>  /  DBili  5.2<H>  /  AST  120<H>  /  ALT  68<H>  /  AlkPhos  127<H>  12-10      Urinalysis Basic - ( 10 Dec 2023 05:49 )    Color: x / Appearance: x / SG: x / pH: x  Gluc: 112 mg/dL / Ketone: x  / Bili: x / Urobili: x   Blood: x / Protein: x / Nitrite: x   Leuk Esterase: x / RBC: x / WBC x   Sq Epi: x / Non Sq Epi: x / Bacteria: x      CAPILLARY BLOOD GLUCOSE        LIVER FUNCTIONS - ( 10 Dec 2023 05:49 )  Alb: 2.2 g/dL / Pro: 8.0 g/dL / ALK PHOS: 127 U/L / ALT: 68 U/L / AST: 120 U/L / GGT: x             RADIOLOGY & ADDITIONAL STUDIES:   SUBJECTIVE:  ON pt had rapid afib to 130s but converted w/out intervention. Pt endorses feeling well this AM and endorses mild nausea but no episodes of emesis. Tolerating PO intake.     MEDICATIONS  (STANDING):  chlorhexidine 2% Cloths 1 Application(s) Topical <User Schedule>  cholestyramine Powder (Sugar-Free) 4 Gram(s) Oral daily  epoetin matt-epbx (RETACRIT) Injectable 71575 Unit(s) SubCutaneous every 7 days  ergocalciferol 48877 Unit(s) Oral <User Schedule>  glucagon  Injectable 1 milliGRAM(s) IntraMuscular once  heparin   Injectable 5000 Unit(s) SubCutaneous every 8 hours  levothyroxine Injectable 30 MICROGram(s) IV Push at bedtime  metoprolol tartrate Injectable 5 milliGRAM(s) IV Push every 6 hours  Parenteral Nutrition - Adult 1 Each TPN Continuous <Continuous>  ursodiol Suspension 300 milliGRAM(s) Oral every 8 hours  venlafaxine XR. 150 milliGRAM(s) Oral daily    MEDICATIONS  (PRN):  hydrALAZINE Injectable 10 milliGRAM(s) IV Push every 6 hours PRN SBP >170  lidocaine 2% Viscous 10 milliLiter(s) Swish and Spit every 12 hours PRN tongue pain  LORazepam   Injectable 0.5 milliGRAM(s) IV Push at bedtime PRN Anxiety  ondansetron Injectable 4 milliGRAM(s) IV Push every 6 hours PRN Nausea and/or Vomiting      Vital Signs Last 24 Hrs  T(C): 36.9 (11 Dec 2023 05:34), Max: 37.9 (10 Dec 2023 09:00)  T(F): 98.4 (11 Dec 2023 05:34), Max: 100.2 (10 Dec 2023 09:00)  HR: 88 (11 Dec 2023 07:00) (87 - 88)  BP: 173/78 (11 Dec 2023 07:00) (115/58 - 182/79)  BP(mean): 112 (11 Dec 2023 07:00) (83 - 114)  RR: 22 (11 Dec 2023 07:00) (20 - 27)  SpO2: 95% (11 Dec 2023 07:00) (92% - 98%)    Parameters below as of 11 Dec 2023 07:00  Patient On (Oxygen Delivery Method): room air        Physical Exam:  General: NAD, resting comfortably in bed  Pulmonary: Nonlabored breathing, no respiratory distress  Cardiovascular: NSR  Abdominal: soft, NT/ND, eakins pouch has thick, bilious output   Extremities: WWP, normal strength  Neuro: A/O x 3, CNs II-XII grossly intact, no focal deficits    I&O's Summary    10 Dec 2023 07:01  -  11 Dec 2023 07:00  --------------------------------------------------------  IN: 3190 mL / OUT: 4445 mL / NET: -1255 mL        LABS:                        8.5    7.70  )-----------( 197      ( 10 Dec 2023 05:49 )             26.8     12-10    134<L>  |  100  |  47<H>  ----------------------------<  112<H>  4.7   |  24  |  2.78<H>    Ca    8.2<L>      10 Dec 2023 05:49  Phos  3.6     12-10  Mg     2.4     12-10    TPro  8.0  /  Alb  2.2<L>  /  TBili  7.4<H>  /  DBili  5.2<H>  /  AST  120<H>  /  ALT  68<H>  /  AlkPhos  127<H>  12-10      Urinalysis Basic - ( 10 Dec 2023 05:49 )    Color: x / Appearance: x / SG: x / pH: x  Gluc: 112 mg/dL / Ketone: x  / Bili: x / Urobili: x   Blood: x / Protein: x / Nitrite: x   Leuk Esterase: x / RBC: x / WBC x   Sq Epi: x / Non Sq Epi: x / Bacteria: x      CAPILLARY BLOOD GLUCOSE        LIVER FUNCTIONS - ( 10 Dec 2023 05:49 )  Alb: 2.2 g/dL / Pro: 8.0 g/dL / ALK PHOS: 127 U/L / ALT: 68 U/L / AST: 120 U/L / GGT: x             RADIOLOGY & ADDITIONAL STUDIES:

## 2023-12-11 NOTE — PROGRESS NOTE ADULT - SUBJECTIVE AND OBJECTIVE BOX
INTERVAL/OVERNIGHT EVENTS:    SUBJECTIVE: States he feels well. No specific complaints. Denies chest pain, SOB, abdominal pain.     Neurologic Medications  LORazepam   Injectable 0.5 milliGRAM(s) IV Push at bedtime PRN Anxiety  ondansetron Injectable 4 milliGRAM(s) IV Push every 6 hours PRN Nausea and/or Vomiting  venlafaxine XR. 150 milliGRAM(s) Oral daily    Respiratory Medications    Cardiovascular Medications  hydrALAZINE Injectable 10 milliGRAM(s) IV Push every 6 hours PRN SBP >170  metoprolol tartrate Injectable 5 milliGRAM(s) IV Push every 6 hours    Gastrointestinal Medications  ergocalciferol 55865 Unit(s) Oral <User Schedule>  lipid, fat emulsion (Fish Oil and Plant Based) 20% Infusion 0.54 Gm/kG/Day IV Continuous <Continuous>  Parenteral Nutrition - Adult 1 Each TPN Continuous <Continuous>  Parenteral Nutrition - Adult 1 Each TPN Continuous <Continuous>  ursodiol Suspension 300 milliGRAM(s) Oral every 8 hours    Genitourinary Medications    Hematologic/Oncologic Medications  epoetin matt-epbx (RETACRIT) Injectable 23957 Unit(s) SubCutaneous every 7 days  heparin   Injectable 5000 Unit(s) SubCutaneous every 8 hours    Antimicrobial/Immunologic Medications    Endocrine/Metabolic Medications  cholestyramine Powder (Sugar-Free) 4 Gram(s) Oral daily  glucagon  Injectable 1 milliGRAM(s) IntraMuscular once  levothyroxine Injectable 30 MICROGram(s) IV Push at bedtime    Topical/Other Medications  chlorhexidine 2% Cloths 1 Application(s) Topical <User Schedule>  lidocaine 2% Viscous 10 milliLiter(s) Swish and Spit every 12 hours PRN tongue pain      MEDICATIONS  (PRN):  hydrALAZINE Injectable 10 milliGRAM(s) IV Push every 6 hours PRN SBP >170  lidocaine 2% Viscous 10 milliLiter(s) Swish and Spit every 12 hours PRN tongue pain  LORazepam   Injectable 0.5 milliGRAM(s) IV Push at bedtime PRN Anxiety  ondansetron Injectable 4 milliGRAM(s) IV Push every 6 hours PRN Nausea and/or Vomiting      I&O's Detail    10 Dec 2023 07:01  -  11 Dec 2023 07:00  --------------------------------------------------------  IN:    IV PiggyBack: 100 mL    IV PiggyBack: 1000 mL    TPN (Total Parenteral Nutrition): 2090 mL  Total IN: 3190 mL    OUT:    Drain (mL): 20 mL    Drain (mL): 775 mL    Drain (mL): 1000 mL    Voided (mL): 2650 mL  Total OUT: 4445 mL    Total NET: -1255 mL      11 Dec 2023 07:01  -  11 Dec 2023 19:07  --------------------------------------------------------  IN:    Fat Emulsion (Fish Oil &amp; Plant Based) 20% Infusion: 41.6 mL    Oral Fluid: 480 mL    Sodium Chloride 0.9% Bolus: 1000 mL    TPN (Total Parenteral Nutrition): 703.8 mL  Total IN: 2225.4 mL    OUT:    Drain (mL): 0 mL    Drain (mL): 225 mL    Drain (mL): 675 mL    Voided (mL): 1200 mL  Total OUT: 2100 mL    Total NET: 125.4 mL          Vital Signs Last 24 Hrs  T(C): 36.5 (11 Dec 2023 18:25), Max: 37.8 (11 Dec 2023 15:00)  T(F): 97.7 (11 Dec 2023 18:25), Max: 100 (11 Dec 2023 15:00)  HR: 89 (11 Dec 2023 18:00) (87 - 91)  BP: 138/62 (11 Dec 2023 18:00) (131/60 - 182/79)  BP(mean): 89 (11 Dec 2023 18:00) (83 - 114)  RR: 24 (11 Dec 2023 18:00) (20 - 25)  SpO2: 93% (11 Dec 2023 18:00) (93% - 96%)    Parameters below as of 11 Dec 2023 19:00  Patient On (Oxygen Delivery Method): room air        GENERAL: NAD, resting comfortably in bed  HEENT: NCAT, MMM. +scleral icterus.   C/V: Normal rate, normal peripheral perfusion. No murmurs.   PULM: Nonlabored breathing, no respiratory distress, CTA.   ABD: Soft, ND, NT, no rebound tenderness, no guarding. PCT draining to bag, brown bile.   EXTREM: WWP, no edema, SCDs in place  NEURO: No focal deficits    LABS:                        8.7    7.51  )-----------( 188      ( 11 Dec 2023 12:17 )             27.7     12-11    137  |  106  |  44<H>  ----------------------------<  130<H>  4.4   |  22  |  2.51<H>    Ca    8.2<L>      11 Dec 2023 12:17  Phos  3.6     12-10  Mg     2.4     12-10    TPro  8.3  /  Alb  2.4<L>  /  TBili  7.4<H>  /  DBili  x   /  AST  133<H>  /  ALT  71<H>  /  AlkPhos  127<H>  12-11      Urinalysis Basic - ( 11 Dec 2023 12:17 )    Color: x / Appearance: x / SG: x / pH: x  Gluc: 130 mg/dL / Ketone: x  / Bili: x / Urobili: x   Blood: x / Protein: x / Nitrite: x   Leuk Esterase: x / RBC: x / WBC x   Sq Epi: x / Non Sq Epi: x / Bacteria: x        RADIOLOGY & ADDITIONAL STUDIES:    78M w PMH Crohn's, AFib/Flutter s/p DCCVs on amiodarone, remote ileocectomy and open appendectomy. Admitted (6/23) for SBO vs Crohns flare, s/p NGT decompression and s/p lap converted to open TRE, SBR x 3, left in discontinuity with abthera vac on (6/27), RTOR for ileocolic resection, small bowel anastomosis, and abdominal wall closure on (6/28), c/b fluid collection s/p IR aspiration of perihepatic fluid on (7/3), c/b wound dehiscence s/p RTOR exlap, washout, ileocolic resection with end ileostomy, blow hole colostomy, red rubber from ileostomy to small bowel anastomosis; vicryl bridging mesh on (7/5) transferred to SICU postoperatively for hemodynamic monitoring, with hospital course complicated by periods of severe ELVI and hyponatremia, which resolved but stepped back up for to SICU on (9/10) for acute AMS, intermittent hypoglycemia, AFib with RVR. Percutaneous Cholecystostomy placed on (9/11) for enlarged GB, PCT capped 10/5 and uncapped 10/25 for hyperbilirubinemia, Right brachial DVT, left basillic and cephalic superficial thrombus on duplex 11/2, CT 11/14 with ALDEN ground glass opacity of unclear etiology, completed empiric 7day cefepime course 11/22, rising T-bili of unclear etilogy, now w resolved candida glabrata fungemia, ELVI    NEURO: AMS resolved, likely septic encephalopathy, EEG neg. Hx of depression - cont Effexor. Nausea: Zofran PRN. Anxiety: Lorazepam PRN.  CV: Hx Afib/flutter: s/p DCCV, Amio stopped due to persistent transaminitis. Continue Metoprolol 5q6 with hold parameters. Hx HLD: Lipitor held given high LFTs. TTE  (7/18) - PASP 64mmHg, EF 65-70%, Hx HTN - stopped PO Norvasc as unlikely absorbing, holding Losartan. BP borderline - continue holding hydralazine.   PULM: CT from 11/14 with ALDEN ground glass opacity of unclear etiology. COVID negative. RVP negative. completed 7d empiric cefepime 11/22 to cover for potential PNA. Dyspnea improved.  GI/FEN: Low res, low fat, high protein diet. Cont Ensure max x1/day. fungemia likely CLABSI. PICC replaced on 12/4 and continue TPN qd, lipids MWF.  High output EC fistula: cont Octreotide & Cholestyramine 10/18. Transaminitis elevated T bili of unclear origin, consulted Team 1. GI deferring decision pending further evaluation by hepatology team. s/p Perc Deb on 9/11; MRCP 10/30 no obstruction, seen by Hepatology started on ursodiol, RUQ US 11/25 CBD 5mm, hepatic vessels patent Repeat. MRCP neg. Monitor drainage from fistula, bolus as needed to keep I&O even. Ensure once daily with LPS. Folate, thiamine.  : Voids. ELVI, Cr rising today. d/c famotidine and half Effecor dose given renal dysfunction. Caspo unlikely etiology of ELVI per ID. s/p brief bicarb gtt.  MARLO Duplex and RP US unremarkable, CK wnl.  f/u renal recs pending glomerulonephritis labs.  ENDO: Hx hypothyroid: IV Synthroid, TSH low on 11/30, decreased synthroid dose, recheck TSH 12/6 wnl.  ID: BCx 11/22 grew candida glabrata, likely CLABSI. ID consulted, s/p Caspo, switched to Fluconazole (12/1-12/9). Repeat surveillance blood cx NGTD. Ophthalmology evaulated - normal ocular exam. Echo no vegetation. PICC replaced on 12/4.  Recent MRSA swab negative, RVP panel negative, COVID negative. Cont Nystatin powder // PREVIOUSLY  caspofungin (11/24-12/1). completed empiric 7days cefepime (11/15-11/22), had C. tertium, Lactobacillis from his IR cx 7/3, and candida albicans, lactobacillus, vanc sensitive E faecium, vanc resistant E gallinarum, vanc resistant E casseliflavus, lactobacillus from his OR cx 7/5. Completed course of abx with Imipenem (9/10-9/15). Imipenem (8/26--) imipenem (6/30-7/12, 7/23-7/30), Dapto (6/30-7/5 and 7/23-7/24). Empiric dapto (8/23-24) and cefepime (8/23-24).   PPX: SCDs, SQH, was on Therapeutic lovenox bid for R brachial vein DVT, but will hold off on hep gtt since repeat US Duplex negative.  HEME: Anemia - continue Epogen weekly. S/p Iron Sucrose 200 qd x 3 days for chronic anemia.  LINES: PIVs, LUE PICC (12/4 - ) // DC: LUE PICC (9/1-11/1 ), RUE PICC: (11/1-11/24)   WOUNDS/DRAINS: Abdominal wound and fistula with wound manager in place dressing change Tuesday and Friday. RLQ Ostomy. IR perc deb tube. // DC: L Clay drain.  PT: Ambulate as tolerated   DISPO: SICU due to complicated hospital course.       INTERVAL/OVERNIGHT EVENTS:    SUBJECTIVE: States he feels well. No specific complaints. Denies chest pain, SOB, abdominal pain.     Neurologic Medications  LORazepam   Injectable 0.5 milliGRAM(s) IV Push at bedtime PRN Anxiety  ondansetron Injectable 4 milliGRAM(s) IV Push every 6 hours PRN Nausea and/or Vomiting  venlafaxine XR. 150 milliGRAM(s) Oral daily    Respiratory Medications    Cardiovascular Medications  hydrALAZINE Injectable 10 milliGRAM(s) IV Push every 6 hours PRN SBP >170  metoprolol tartrate Injectable 5 milliGRAM(s) IV Push every 6 hours    Gastrointestinal Medications  ergocalciferol 28286 Unit(s) Oral <User Schedule>  lipid, fat emulsion (Fish Oil and Plant Based) 20% Infusion 0.54 Gm/kG/Day IV Continuous <Continuous>  Parenteral Nutrition - Adult 1 Each TPN Continuous <Continuous>  Parenteral Nutrition - Adult 1 Each TPN Continuous <Continuous>  ursodiol Suspension 300 milliGRAM(s) Oral every 8 hours    Genitourinary Medications    Hematologic/Oncologic Medications  epoetin matt-epbx (RETACRIT) Injectable 47764 Unit(s) SubCutaneous every 7 days  heparin   Injectable 5000 Unit(s) SubCutaneous every 8 hours    Antimicrobial/Immunologic Medications    Endocrine/Metabolic Medications  cholestyramine Powder (Sugar-Free) 4 Gram(s) Oral daily  glucagon  Injectable 1 milliGRAM(s) IntraMuscular once  levothyroxine Injectable 30 MICROGram(s) IV Push at bedtime    Topical/Other Medications  chlorhexidine 2% Cloths 1 Application(s) Topical <User Schedule>  lidocaine 2% Viscous 10 milliLiter(s) Swish and Spit every 12 hours PRN tongue pain      MEDICATIONS  (PRN):  hydrALAZINE Injectable 10 milliGRAM(s) IV Push every 6 hours PRN SBP >170  lidocaine 2% Viscous 10 milliLiter(s) Swish and Spit every 12 hours PRN tongue pain  LORazepam   Injectable 0.5 milliGRAM(s) IV Push at bedtime PRN Anxiety  ondansetron Injectable 4 milliGRAM(s) IV Push every 6 hours PRN Nausea and/or Vomiting      I&O's Detail    10 Dec 2023 07:01  -  11 Dec 2023 07:00  --------------------------------------------------------  IN:    IV PiggyBack: 100 mL    IV PiggyBack: 1000 mL    TPN (Total Parenteral Nutrition): 2090 mL  Total IN: 3190 mL    OUT:    Drain (mL): 20 mL    Drain (mL): 775 mL    Drain (mL): 1000 mL    Voided (mL): 2650 mL  Total OUT: 4445 mL    Total NET: -1255 mL      11 Dec 2023 07:01  -  11 Dec 2023 19:07  --------------------------------------------------------  IN:    Fat Emulsion (Fish Oil &amp; Plant Based) 20% Infusion: 41.6 mL    Oral Fluid: 480 mL    Sodium Chloride 0.9% Bolus: 1000 mL    TPN (Total Parenteral Nutrition): 703.8 mL  Total IN: 2225.4 mL    OUT:    Drain (mL): 0 mL    Drain (mL): 225 mL    Drain (mL): 675 mL    Voided (mL): 1200 mL  Total OUT: 2100 mL    Total NET: 125.4 mL          Vital Signs Last 24 Hrs  T(C): 36.5 (11 Dec 2023 18:25), Max: 37.8 (11 Dec 2023 15:00)  T(F): 97.7 (11 Dec 2023 18:25), Max: 100 (11 Dec 2023 15:00)  HR: 89 (11 Dec 2023 18:00) (87 - 91)  BP: 138/62 (11 Dec 2023 18:00) (131/60 - 182/79)  BP(mean): 89 (11 Dec 2023 18:00) (83 - 114)  RR: 24 (11 Dec 2023 18:00) (20 - 25)  SpO2: 93% (11 Dec 2023 18:00) (93% - 96%)    Parameters below as of 11 Dec 2023 19:00  Patient On (Oxygen Delivery Method): room air        GENERAL: NAD, resting comfortably in bed  HEENT: NCAT, MMM. +scleral icterus.   C/V: Normal rate, normal peripheral perfusion. No murmurs.   PULM: Nonlabored breathing, no respiratory distress, CTA.   ABD: Soft, ND, NT, no rebound tenderness, no guarding. PCT draining to bag, brown bile.   EXTREM: WWP, no edema, SCDs in place  NEURO: No focal deficits    LABS:                        8.7    7.51  )-----------( 188      ( 11 Dec 2023 12:17 )             27.7     12-11    137  |  106  |  44<H>  ----------------------------<  130<H>  4.4   |  22  |  2.51<H>    Ca    8.2<L>      11 Dec 2023 12:17  Phos  3.6     12-10  Mg     2.4     12-10    TPro  8.3  /  Alb  2.4<L>  /  TBili  7.4<H>  /  DBili  x   /  AST  133<H>  /  ALT  71<H>  /  AlkPhos  127<H>  12-11      Urinalysis Basic - ( 11 Dec 2023 12:17 )    Color: x / Appearance: x / SG: x / pH: x  Gluc: 130 mg/dL / Ketone: x  / Bili: x / Urobili: x   Blood: x / Protein: x / Nitrite: x   Leuk Esterase: x / RBC: x / WBC x   Sq Epi: x / Non Sq Epi: x / Bacteria: x        RADIOLOGY & ADDITIONAL STUDIES:    78M w PMH Crohn's, AFib/Flutter s/p DCCVs on amiodarone, remote ileocectomy and open appendectomy. Admitted (6/23) for SBO vs Crohns flare, s/p NGT decompression and s/p lap converted to open TRE, SBR x 3, left in discontinuity with abthera vac on (6/27), RTOR for ileocolic resection, small bowel anastomosis, and abdominal wall closure on (6/28), c/b fluid collection s/p IR aspiration of perihepatic fluid on (7/3), c/b wound dehiscence s/p RTOR exlap, washout, ileocolic resection with end ileostomy, blow hole colostomy, red rubber from ileostomy to small bowel anastomosis; vicryl bridging mesh on (7/5) transferred to SICU postoperatively for hemodynamic monitoring, with hospital course complicated by periods of severe ELVI and hyponatremia, which resolved but stepped back up for to SICU on (9/10) for acute AMS, intermittent hypoglycemia, AFib with RVR. Percutaneous Cholecystostomy placed on (9/11) for enlarged GB, PCT capped 10/5 and uncapped 10/25 for hyperbilirubinemia, Right brachial DVT, left basillic and cephalic superficial thrombus on duplex 11/2, CT 11/14 with ALDEN ground glass opacity of unclear etiology, completed empiric 7day cefepime course 11/22, rising T-bili of unclear etilogy, now w resolved candida glabrata fungemia, ELVI    NEURO: AMS resolved, likely septic encephalopathy, EEG neg. Hx of depression - cont Effexor. Nausea: Zofran PRN. Anxiety: Lorazepam PRN.  CV: Hx Afib/flutter: s/p DCCV, Amio stopped due to persistent transaminitis. Continue Metoprolol 5q6 with hold parameters. Hx HLD: Lipitor held given high LFTs. TTE  (7/18) - PASP 64mmHg, EF 65-70%, Hx HTN - stopped PO Norvasc as unlikely absorbing, holding Losartan. BP borderline - continue holding hydralazine.   PULM: CT from 11/14 with ALDEN ground glass opacity of unclear etiology. COVID negative. RVP negative. completed 7d empiric cefepime 11/22 to cover for potential PNA. Dyspnea improved.  GI/FEN: Low res, low fat, high protein diet. Cont Ensure max x1/day. fungemia likely CLABSI. PICC replaced on 12/4 and continue TPN qd, lipids MWF.  High output EC fistula: cont Octreotide & Cholestyramine 10/18. Transaminitis elevated T bili of unclear origin, consulted Team 1. GI deferring decision pending further evaluation by hepatology team. s/p Perc Deb on 9/11; MRCP 10/30 no obstruction, seen by Hepatology started on ursodiol, RUQ US 11/25 CBD 5mm, hepatic vessels patent Repeat. MRCP neg. Monitor drainage from fistula, bolus as needed to keep I&O even. Ensure once daily with LPS. Folate, thiamine.  : Voids. ELVI, Cr rising today. d/c famotidine and half Effecor dose given renal dysfunction. Caspo unlikely etiology of ELVI per ID. s/p brief bicarb gtt.  MARLO Duplex and RP US unremarkable, CK wnl.  f/u renal recs pending glomerulonephritis labs.  ENDO: Hx hypothyroid: IV Synthroid, TSH low on 11/30, decreased synthroid dose, recheck TSH 12/6 wnl.  ID: BCx 11/22 grew candida glabrata, likely CLABSI. ID consulted, s/p Caspo, switched to Fluconazole (12/1-12/9). Repeat surveillance blood cx NGTD. Ophthalmology evaulated - normal ocular exam. Echo no vegetation. PICC replaced on 12/4.  Recent MRSA swab negative, RVP panel negative, COVID negative. Cont Nystatin powder // PREVIOUSLY  caspofungin (11/24-12/1). completed empiric 7days cefepime (11/15-11/22), had C. tertium, Lactobacillis from his IR cx 7/3, and candida albicans, lactobacillus, vanc sensitive E faecium, vanc resistant E gallinarum, vanc resistant E casseliflavus, lactobacillus from his OR cx 7/5. Completed course of abx with Imipenem (9/10-9/15). Imipenem (8/26--) imipenem (6/30-7/12, 7/23-7/30), Dapto (6/30-7/5 and 7/23-7/24). Empiric dapto (8/23-24) and cefepime (8/23-24).   PPX: SCDs, SQH, was on Therapeutic lovenox bid for R brachial vein DVT, but will hold off on hep gtt since repeat US Duplex negative.  HEME: Anemia - continue Epogen weekly. S/p Iron Sucrose 200 qd x 3 days for chronic anemia.  LINES: PIVs, LUE PICC (12/4 - ) // DC: LUE PICC (9/1-11/1 ), RUE PICC: (11/1-11/24)   WOUNDS/DRAINS: Abdominal wound and fistula with wound manager in place dressing change Tuesday and Friday. RLQ Ostomy. IR perc deb tube. // DC: L Clay drain.  PT: Ambulate as tolerated   DISPO: SICU due to complicated hospital course.

## 2023-12-11 NOTE — PROGRESS NOTE ADULT - ASSESSMENT
78M w PMH Crohn's, AFib/Flutter s/p DCCVs on amiodarone, remote ileocectomy and open appendectomy. Admitted (6/23) for SBO vs Crohns flare, s/p NGT decompression and s/p lap converted to open TRE, SBR x 3, left in discontinuity with abthera vac on (6/27), RTOR for ileocolic resection, small bowel anastomosis, and abdominal wall closure on (6/28), c/b fluid collection s/p IR aspiration of perihepatic fluid on (7/3), c/b wound dehiscence s/p RTOR exlap, washout, ileocolic resection with end ileostomy, blow hole colostomy, red rubber from ileostomy to small bowel anastomosis; vicryl bridging mesh on (7/5) transferred to SICU postoperatively for hemodynamic monitoring, with hospital course complicated by periods of severe ELVI and hyponatremia, which resolved but stepped back up for to SICU on (9/10) for acute AMS, intermittent hypoglycemia, AFib with RVR. Percutaneous Cholecystostomy placed on (9/11) for enlarged GB, PCT capped 10/5 and uncapped 10/25 for hyperbilirubinemia, Right brachial DVT, left basillic and cephalic superficial thrombus on duplex 11/2, CT 11/14 with ALDEN ground glass opacity of unclear etiology, completed empiric 7day cefepime course 11/22, rising T-bili of unclear etilogy, now w resolved candida glabrata fungemia, ELVI    Trend Cr, f/u Nephrology w/ addl recs   cw diet as tolerated   rest of care per SICU  appreciate excellent care by SICU team

## 2023-12-11 NOTE — PROGRESS NOTE ADULT - ATTENDING COMMENTS
ARF -- seems likely perfusional - improved again with higher BP   keep I/O even to positive  aim for -150s systolic for now  check renal dopplers r/o MARLO with recurrent ELVI in setting of lower BP r/o perfusion limitation due to MARLO

## 2023-12-11 NOTE — PROGRESS NOTE ADULT - SUBJECTIVE AND OBJECTIVE BOX
78y Male seen and evaluated at bedside. He refers SOB, mild nausea but no episodes of emesis, overall feels well. BP stable.      Meds:  chlorhexidine 2% Cloths 1 Application(s) Topical <User Schedule>  cholestyramine Powder (Sugar-Free) 4 Gram(s) Oral daily  epoetin matt-epbx (RETACRIT) Injectable 34262 Unit(s) SubCutaneous every 7 days  ergocalciferol 88330 Unit(s) Oral <User Schedule>  glucagon  Injectable 1 milliGRAM(s) IntraMuscular once  heparin   Injectable 5000 Unit(s) SubCutaneous every 8 hours  levothyroxine Injectable 30 MICROGram(s) IV Push at bedtime  metoprolol tartrate Injectable 5 milliGRAM(s) IV Push every 6 hours  Parenteral Nutrition - Adult 1 Each TPN Continuous <Continuous>  ursodiol Suspension 300 milliGRAM(s) Oral every 8 hours  venlafaxine XR. 150 milliGRAM(s) Oral daily      Vitals Signs Last 24 Hrs  T(C): 37 (11 Dec 2023 13:54), Max: 37.9 (10 Dec 2023 09:00)  T(F): 98.8 (11 Dec 2023 13:54), Max: 100.2 (10 Dec 2023 09:00)  HR: 91(11 Dec 2023 13:00) (87 - 91)  BP: 131/60 (11 Dec 2023 13:00) (115/58 - 174/77)  BP(mean): 83 (11 Dec 2023 13:00) (83 - 111)  RR: 22 (11 Dec 2023 13:00) (20 - 27)  SpO2: 94% (11 Dec 2023 13:00) (92% - 98%)    Parameters below as of 11 Dec 2023 14:00  Patient On (Oxygen Delivery Method): room air      9 Dec 2023 07:01  -  10 Dec 2023 07:00  --------------------------------------------------------  IN: 3022 mL / OUT: 3130 mL / NET: -108 mL    10 Dec 2023 07:01  -  11 Dec 2023 07:00  --------------------------------------------------------  IN: 3190 mL / OUT: 4445 mL / NET: -1255 mL    11 Dec 2023 07:01  -  11 Dec 2023 19:00  -------------------------------------------------------  IN: 1975 mL / OUT: 1200 mL / NET: -775 mL    Review of Systems:  ROS negative except as per HPI    PHYSICAL EXAM:  Constitutional: NAD, resting in bed  HEENT: NCAT, EOMI  Respiratory: CTAB, no crackles, no accessory muscle use, no effort.  Cardiovascular: regular rate  Gastrointestinal: abdominal wound noted with conrado pouch, PCT noted  Extremities: 1+ LE edema -  improved  Neurological: Awake, alert, moving all extremities      LABS:               8.7    7.51)-----------(  188  ( 11 Dec 2023 12:17)             27.7     12-11  137  |  106 |  44<H>  ----------------------------< 130 <H>  4.4   |  22  |  2.5<H>    Ca    8.2<L>      11 Dec 2023 12:17    TPro  8.3  /  Alb  2.4<L>  /  TBili  7.4<H>  / AST  133<H>  /  ALT  71<H>  /  AlkPhos  127<H>  12-11      CMV by PCR 12/10  Not detected      RADIOLOGY & ADDITIONAL STUDIES: revised 78y Male seen and evaluated at bedside. He refers SOB, mild nausea but no episodes of emesis, overall feels well. BP stable.      Meds:  chlorhexidine 2% Cloths 1 Application(s) Topical <User Schedule>  cholestyramine Powder (Sugar-Free) 4 Gram(s) Oral daily  epoetin matt-epbx (RETACRIT) Injectable 98615 Unit(s) SubCutaneous every 7 days  ergocalciferol 59371 Unit(s) Oral <User Schedule>  glucagon  Injectable 1 milliGRAM(s) IntraMuscular once  heparin   Injectable 5000 Unit(s) SubCutaneous every 8 hours  levothyroxine Injectable 30 MICROGram(s) IV Push at bedtime  metoprolol tartrate Injectable 5 milliGRAM(s) IV Push every 6 hours  Parenteral Nutrition - Adult 1 Each TPN Continuous <Continuous>  ursodiol Suspension 300 milliGRAM(s) Oral every 8 hours  venlafaxine XR. 150 milliGRAM(s) Oral daily      Vitals Signs Last 24 Hrs  T(C): 37 (11 Dec 2023 13:54), Max: 37.9 (10 Dec 2023 09:00)  T(F): 98.8 (11 Dec 2023 13:54), Max: 100.2 (10 Dec 2023 09:00)  HR: 91(11 Dec 2023 13:00) (87 - 91)  BP: 131/60 (11 Dec 2023 13:00) (115/58 - 174/77)  BP(mean): 83 (11 Dec 2023 13:00) (83 - 111)  RR: 22 (11 Dec 2023 13:00) (20 - 27)  SpO2: 94% (11 Dec 2023 13:00) (92% - 98%)    Parameters below as of 11 Dec 2023 14:00  Patient On (Oxygen Delivery Method): room air      9 Dec 2023 07:01  -  10 Dec 2023 07:00  --------------------------------------------------------  IN: 3022 mL / OUT: 3130 mL / NET: -108 mL    10 Dec 2023 07:01  -  11 Dec 2023 07:00  --------------------------------------------------------  IN: 3190 mL / OUT: 4445 mL / NET: -1255 mL    11 Dec 2023 07:01  -  11 Dec 2023 19:00  -------------------------------------------------------  IN: 1975 mL / OUT: 1200 mL / NET: -775 mL    Review of Systems:  ROS negative except as per HPI    PHYSICAL EXAM:  Constitutional: NAD, resting in bed  HEENT: NCAT, EOMI  Respiratory: CTAB, no crackles, no accessory muscle use, no effort.  Cardiovascular: regular rate  Gastrointestinal: abdominal wound noted with conrado pouch, PCT noted  Extremities: 1+ LE edema -  improved  Neurological: Awake, alert, moving all extremities      LABS:               8.7    7.51)-----------(  188  ( 11 Dec 2023 12:17)             27.7     12-11  137  |  106 |  44<H>  ----------------------------< 130 <H>  4.4   |  22  |  2.5<H>    Ca    8.2<L>      11 Dec 2023 12:17    TPro  8.3  /  Alb  2.4<L>  /  TBili  7.4<H>  / AST  133<H>  /  ALT  71<H>  /  AlkPhos  127<H>  12-11      CMV by PCR 12/10  Not detected      RADIOLOGY & ADDITIONAL STUDIES: revised 78y Male seen and evaluated at bedside. He refers SOB, mild nausea but no episodes of emesis, not feeling great today - not vague about sx BP stable.      Meds:  chlorhexidine 2% Cloths 1 Application(s) Topical <User Schedule>  cholestyramine Powder (Sugar-Free) 4 Gram(s) Oral daily  epoetin matt-epbx (RETACRIT) Injectable 42579 Unit(s) SubCutaneous every 7 days  ergocalciferol 99216 Unit(s) Oral <User Schedule>  glucagon  Injectable 1 milliGRAM(s) IntraMuscular once  heparin   Injectable 5000 Unit(s) SubCutaneous every 8 hours  levothyroxine Injectable 30 MICROGram(s) IV Push at bedtime  metoprolol tartrate Injectable 5 milliGRAM(s) IV Push every 6 hours  Parenteral Nutrition - Adult 1 Each TPN Continuous <Continuous>  ursodiol Suspension 300 milliGRAM(s) Oral every 8 hours  venlafaxine XR. 150 milliGRAM(s) Oral daily      Vitals Signs Last 24 Hrs  T(C): 37 (11 Dec 2023 13:54), Max: 37.9 (10 Dec 2023 09:00)  T(F): 98.8 (11 Dec 2023 13:54), Max: 100.2 (10 Dec 2023 09:00)  HR: 91(11 Dec 2023 13:00) (87 - 91)  BP: 131/60 (11 Dec 2023 13:00) (115/58 - 174/77)  BP(mean): 83 (11 Dec 2023 13:00) (83 - 111)  RR: 22 (11 Dec 2023 13:00) (20 - 27)  SpO2: 94% (11 Dec 2023 13:00) (92% - 98%)    Parameters below as of 11 Dec 2023 14:00  Patient On (Oxygen Delivery Method): room air      9 Dec 2023 07:01  -  10 Dec 2023 07:00  --------------------------------------------------------  IN: 3022 mL / OUT: 3130 mL / NET: -108 mL    10 Dec 2023 07:01  -  11 Dec 2023 07:00  --------------------------------------------------------  IN: 3190 mL / OUT: 4445 mL / NET: -1255 mL    11 Dec 2023 07:01  -  11 Dec 2023 19:00  -------------------------------------------------------  IN: 1975 mL / OUT: 1200 mL / NET: -775 mL    Review of Systems:  ROS negative except as per HPI    PHYSICAL EXAM:  Constitutional: NAD, resting in bed  HEENT: NCAT, EOMI  Respiratory: CTAB, no crackles, no accessory muscle use, no effort.  Cardiovascular: regular rate  Gastrointestinal: abdominal wound noted with conrado pouch, PCT noted  Extremities: 1+ LE edema -  improved  Neurological: Awake, alert, moving all extremities      LABS:               8.7    7.51)-----------(  188  ( 11 Dec 2023 12:17)             27.7     12-11  137  |  106 |  44<H>  ----------------------------< 130 <H>  4.4   |  22  |  2.5<H>    Ca    8.2<L>      11 Dec 2023 12:17    TPro  8.3  /  Alb  2.4<L>  /  TBili  7.4<H>  / AST  133<H>  /  ALT  71<H>  /  AlkPhos  127<H>  12-11      CMV by PCR 12/10  Not detected      RADIOLOGY & ADDITIONAL STUDIES: revised 78y Male seen and evaluated at bedside. He refers SOB, mild nausea but no episodes of emesis, not feeling great today - not vague about sx BP stable.      Meds:  chlorhexidine 2% Cloths 1 Application(s) Topical <User Schedule>  cholestyramine Powder (Sugar-Free) 4 Gram(s) Oral daily  epoetin matt-epbx (RETACRIT) Injectable 40204 Unit(s) SubCutaneous every 7 days  ergocalciferol 63641 Unit(s) Oral <User Schedule>  glucagon  Injectable 1 milliGRAM(s) IntraMuscular once  heparin   Injectable 5000 Unit(s) SubCutaneous every 8 hours  levothyroxine Injectable 30 MICROGram(s) IV Push at bedtime  metoprolol tartrate Injectable 5 milliGRAM(s) IV Push every 6 hours  Parenteral Nutrition - Adult 1 Each TPN Continuous <Continuous>  ursodiol Suspension 300 milliGRAM(s) Oral every 8 hours  venlafaxine XR. 150 milliGRAM(s) Oral daily      Vitals Signs Last 24 Hrs  T(C): 37 (11 Dec 2023 13:54), Max: 37.9 (10 Dec 2023 09:00)  T(F): 98.8 (11 Dec 2023 13:54), Max: 100.2 (10 Dec 2023 09:00)  HR: 91(11 Dec 2023 13:00) (87 - 91)  BP: 131/60 (11 Dec 2023 13:00) (115/58 - 174/77)  BP(mean): 83 (11 Dec 2023 13:00) (83 - 111)  RR: 22 (11 Dec 2023 13:00) (20 - 27)  SpO2: 94% (11 Dec 2023 13:00) (92% - 98%)    Parameters below as of 11 Dec 2023 14:00  Patient On (Oxygen Delivery Method): room air      9 Dec 2023 07:01  -  10 Dec 2023 07:00  --------------------------------------------------------  IN: 3022 mL / OUT: 3130 mL / NET: -108 mL    10 Dec 2023 07:01  -  11 Dec 2023 07:00  --------------------------------------------------------  IN: 3190 mL / OUT: 4445 mL / NET: -1255 mL    11 Dec 2023 07:01  -  11 Dec 2023 19:00  -------------------------------------------------------  IN: 1975 mL / OUT: 1200 mL / NET: -775 mL    Review of Systems:  ROS negative except as per HPI    PHYSICAL EXAM:  Constitutional: NAD, resting in bed  HEENT: NCAT, EOMI  Respiratory: CTAB, no crackles, no accessory muscle use, no effort.  Cardiovascular: regular rate  Gastrointestinal: abdominal wound noted with conrado pouch, PCT noted  Extremities: 1+ LE edema -  improved  Neurological: Awake, alert, moving all extremities      LABS:               8.7    7.51)-----------(  188  ( 11 Dec 2023 12:17)             27.7     12-11  137  |  106 |  44<H>  ----------------------------< 130 <H>  4.4   |  22  |  2.5<H>    Ca    8.2<L>      11 Dec 2023 12:17    TPro  8.3  /  Alb  2.4<L>  /  TBili  7.4<H>  / AST  133<H>  /  ALT  71<H>  /  AlkPhos  127<H>  12-11      CMV by PCR 12/10  Not detected      RADIOLOGY & ADDITIONAL STUDIES: revised

## 2023-12-11 NOTE — PROGRESS NOTE ADULT - SUBJECTIVE AND OBJECTIVE BOX
SUBJECTIVE:  ON pt had rapid afib to 130s but converted w/out intervention. Pt endorses feeling well this AM and endorses mild nausea but no episodes of emesis. Tolerating PO intake    MEDICATIONS  (STANDING):  chlorhexidine 2% Cloths 1 Application(s) Topical <User Schedule>  cholestyramine Powder (Sugar-Free) 4 Gram(s) Oral daily  epoetin matt-epbx (RETACRIT) Injectable 65496 Unit(s) SubCutaneous every 7 days  ergocalciferol 81939 Unit(s) Oral <User Schedule>  glucagon  Injectable 1 milliGRAM(s) IntraMuscular once  heparin   Injectable 5000 Unit(s) SubCutaneous every 8 hours  levothyroxine Injectable 30 MICROGram(s) IV Push at bedtime  lipid, fat emulsion (Fish Oil and Plant Based) 20% Infusion 0.54 Gm/kG/Day (20.83 mL/Hr) IV Continuous <Continuous>  metoprolol tartrate Injectable 5 milliGRAM(s) IV Push every 6 hours  Parenteral Nutrition - Adult 1 Each TPN Continuous <Continuous>  Parenteral Nutrition - Adult 1 Each TPN Continuous <Continuous>  ursodiol Suspension 300 milliGRAM(s) Oral every 8 hours  venlafaxine XR. 150 milliGRAM(s) Oral daily    MEDICATIONS  (PRN):  hydrALAZINE Injectable 10 milliGRAM(s) IV Push every 6 hours PRN SBP >170  lidocaine 2% Viscous 10 milliLiter(s) Swish and Spit every 12 hours PRN tongue pain  LORazepam   Injectable 0.5 milliGRAM(s) IV Push at bedtime PRN Anxiety  ondansetron Injectable 4 milliGRAM(s) IV Push every 6 hours PRN Nausea and/or Vomiting      Vital Signs Last 24 Hrs  T(C): 37.1 (11 Dec 2023 10:04), Max: 37.1 (11 Dec 2023 10:04)  T(F): 98.8 (11 Dec 2023 10:04), Max: 98.8 (11 Dec 2023 10:04)  HR: 91 (11 Dec 2023 11:00) (87 - 91)  BP: 146/60 (11 Dec 2023 11:00) (131/62 - 182/79)  BP(mean): 87 (11 Dec 2023 11:00) (86 - 114)  RR: 23 (11 Dec 2023 11:00) (20 - 27)  SpO2: 95% (11 Dec 2023 11:00) (93% - 98%)    Parameters below as of 11 Dec 2023 12:00  Patient On (Oxygen Delivery Method): room air    Physical Exam:  General: NAD, resting comfortably in bed  Pulmonary: Nonlabored breathing, no respiratory distress  Cardiovascular: NSR  Abdominal: soft, NT/ND, eakins pouch has thick, bilious output   Extremities: WWP, normal strength  Neuro: A/O x 3, CNs II-XII grossly intact, no focal deficits    I&O's Summary    10 Dec 2023 07:01  -  11 Dec 2023 07:00  --------------------------------------------------------  IN: 3190 mL / OUT: 4445 mL / NET: -1255 mL    11 Dec 2023 07:01  -  11 Dec 2023 12:31  --------------------------------------------------------  IN: 495 mL / OUT: 850 mL / NET: -355 mL      LABS:                        8.5    7.70  )-----------( 197      ( 10 Dec 2023 05:49 )             26.8     12-10    134<L>  |  100  |  47<H>  ----------------------------<  112<H>  4.7   |  24  |  2.78<H>    Ca    8.2<L>      10 Dec 2023 05:49  Phos  3.6     12-10  Mg     2.4     12-10    TPro  8.0  /  Alb  2.2<L>  /  TBili  7.4<H>  /  DBili  5.2<H>  /  AST  120<H>  /  ALT  68<H>  /  AlkPhos  127<H>  12-10      Urinalysis Basic - ( 10 Dec 2023 05:49 )    Color: x / Appearance: x / SG: x / pH: x  Gluc: 112 mg/dL / Ketone: x  / Bili: x / Urobili: x   Blood: x / Protein: x / Nitrite: x   Leuk Esterase: x / RBC: x / WBC x   Sq Epi: x / Non Sq Epi: x / Bacteria: x      CAPILLARY BLOOD GLUCOSE      POCT Blood Glucose.: 131 mg/dL (11 Dec 2023 11:40)    LIVER FUNCTIONS - ( 10 Dec 2023 05:49 )  Alb: 2.2 g/dL / Pro: 8.0 g/dL / ALK PHOS: 127 U/L / ALT: 68 U/L / AST: 120 U/L / GGT: x             RADIOLOGY & ADDITIONAL STUDIES:   SUBJECTIVE:  ON pt had rapid afib to 130s but converted w/out intervention. Pt endorses feeling well this AM and endorses mild nausea but no episodes of emesis. Tolerating PO intake    MEDICATIONS  (STANDING):  chlorhexidine 2% Cloths 1 Application(s) Topical <User Schedule>  cholestyramine Powder (Sugar-Free) 4 Gram(s) Oral daily  epoetin matt-epbx (RETACRIT) Injectable 38404 Unit(s) SubCutaneous every 7 days  ergocalciferol 30877 Unit(s) Oral <User Schedule>  glucagon  Injectable 1 milliGRAM(s) IntraMuscular once  heparin   Injectable 5000 Unit(s) SubCutaneous every 8 hours  levothyroxine Injectable 30 MICROGram(s) IV Push at bedtime  lipid, fat emulsion (Fish Oil and Plant Based) 20% Infusion 0.54 Gm/kG/Day (20.83 mL/Hr) IV Continuous <Continuous>  metoprolol tartrate Injectable 5 milliGRAM(s) IV Push every 6 hours  Parenteral Nutrition - Adult 1 Each TPN Continuous <Continuous>  Parenteral Nutrition - Adult 1 Each TPN Continuous <Continuous>  ursodiol Suspension 300 milliGRAM(s) Oral every 8 hours  venlafaxine XR. 150 milliGRAM(s) Oral daily    MEDICATIONS  (PRN):  hydrALAZINE Injectable 10 milliGRAM(s) IV Push every 6 hours PRN SBP >170  lidocaine 2% Viscous 10 milliLiter(s) Swish and Spit every 12 hours PRN tongue pain  LORazepam   Injectable 0.5 milliGRAM(s) IV Push at bedtime PRN Anxiety  ondansetron Injectable 4 milliGRAM(s) IV Push every 6 hours PRN Nausea and/or Vomiting      Vital Signs Last 24 Hrs  T(C): 37.1 (11 Dec 2023 10:04), Max: 37.1 (11 Dec 2023 10:04)  T(F): 98.8 (11 Dec 2023 10:04), Max: 98.8 (11 Dec 2023 10:04)  HR: 91 (11 Dec 2023 11:00) (87 - 91)  BP: 146/60 (11 Dec 2023 11:00) (131/62 - 182/79)  BP(mean): 87 (11 Dec 2023 11:00) (86 - 114)  RR: 23 (11 Dec 2023 11:00) (20 - 27)  SpO2: 95% (11 Dec 2023 11:00) (93% - 98%)    Parameters below as of 11 Dec 2023 12:00  Patient On (Oxygen Delivery Method): room air    Physical Exam:  General: NAD, resting comfortably in bed  Pulmonary: Nonlabored breathing, no respiratory distress  Cardiovascular: NSR  Abdominal: soft, NT/ND, eakins pouch has thick, bilious output   Extremities: WWP, normal strength  Neuro: A/O x 3, CNs II-XII grossly intact, no focal deficits    I&O's Summary    10 Dec 2023 07:01  -  11 Dec 2023 07:00  --------------------------------------------------------  IN: 3190 mL / OUT: 4445 mL / NET: -1255 mL    11 Dec 2023 07:01  -  11 Dec 2023 12:31  --------------------------------------------------------  IN: 495 mL / OUT: 850 mL / NET: -355 mL      LABS:                        8.5    7.70  )-----------( 197      ( 10 Dec 2023 05:49 )             26.8     12-10    134<L>  |  100  |  47<H>  ----------------------------<  112<H>  4.7   |  24  |  2.78<H>    Ca    8.2<L>      10 Dec 2023 05:49  Phos  3.6     12-10  Mg     2.4     12-10    TPro  8.0  /  Alb  2.2<L>  /  TBili  7.4<H>  /  DBili  5.2<H>  /  AST  120<H>  /  ALT  68<H>  /  AlkPhos  127<H>  12-10      Urinalysis Basic - ( 10 Dec 2023 05:49 )    Color: x / Appearance: x / SG: x / pH: x  Gluc: 112 mg/dL / Ketone: x  / Bili: x / Urobili: x   Blood: x / Protein: x / Nitrite: x   Leuk Esterase: x / RBC: x / WBC x   Sq Epi: x / Non Sq Epi: x / Bacteria: x      CAPILLARY BLOOD GLUCOSE      POCT Blood Glucose.: 131 mg/dL (11 Dec 2023 11:40)    LIVER FUNCTIONS - ( 10 Dec 2023 05:49 )  Alb: 2.2 g/dL / Pro: 8.0 g/dL / ALK PHOS: 127 U/L / ALT: 68 U/L / AST: 120 U/L / GGT: x             RADIOLOGY & ADDITIONAL STUDIES:

## 2023-12-11 NOTE — PROGRESS NOTE ADULT - ASSESSMENT
78M w PMH Crohn's, AFib/Flutter s/p DCCVs on amiodarone, remote ileocectomy and open appendectomy. Admitted (6/23) for SBO vs Crohns flare, s/p NGT decompression and s/p lap converted to open TRE, SBR x 3, left in discontinuity with abthera vac on (6/27), RTOR for ileocolic resection, small bowel anastomosis, and abdominal wall closure on (6/28), c/b fluid collection s/p IR aspiration of perihepatic fluid on (7/3), c/b wound dehiscence s/p RTOR exlap, washout, ileocolic resection with end ileostomy, blow hole colostomy, red rubber from ileostomy to small bowel anastomosis; vicryl bridging mesh on (7/5) transferred to SICU postoperatively for hemodynamic monitoring, with hospital course complicated by periods of severe ELVI and hyponatremia, which resolved but stepped back up for to SICU on (9/10) for acute AMS, intermittent hypoglycemia, AFib with RVR. Percutaneous Cholecystostomy placed on (9/11) for enlarged GB, PCT capped 10/5 and uncapped 10/25 for hyperbilirubinemia, Right brachial DVT, left basillic and cephalic superficial thrombus on duplex 11/2, CT 11/14 with ALDEN ground glass opacity of unclear etiology, completed empiric 7day cefepime course 11/22, rising T-bili of unclear etilogy, now w resolved candida glabrata fungemia, ELVI    Trend Cr, f/u Nephrology w/ addl recs   cw diet as tolerated   f/u hepatology recommendations   Wound care as per ostomy nurses   continue with PT   rest of care per SICU  appreciate excellent care by SICU team

## 2023-12-11 NOTE — PROGRESS NOTE ADULT - ASSESSMENT
77 yo M with prolonged hospital course, initially  admitted for SBO and CD flare s/p multiple post op complications now with recurrent  non-oliguric sATN 2/2 candidemia , with initial improvement however ,last 48hrs with relative hypotension and perfusion shifts with sCr now at 2.8, as  likely reperfusional related rise. 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    Plan:  - Ensure no drops in BP. Allow BP to be 130-150s systolic   - Maintain MAP>70 for adequate renal perfusion. If Cr worsens despite hemodynamic optimization, then may need bx given MGUS on serology, may have possible cast nephropathy  - Repeat UA  - Maintain net even fluid status  - Encourage PO intake. Also on parenteral nutrition  - Daily BMP  - Strict I&O, daily weights 79 yo M with prolonged hospital course, initially  admitted for SBO and CD flare s/p multiple post op complications now with recurrent  non-oliguric sATN 2/2 candidemia , with initial improvement however ,last 48hrs with relative hypotension and perfusion shifts with sCr now at 2.8, as  likely reperfusional related rise. 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    Plan:  - Ensure no drops in BP. Allow BP to be 130-150s systolic   - Maintain MAP>70 for adequate renal perfusion. If Cr worsens despite hemodynamic optimization, then may need bx given MGUS on serology, may have possible cast nephropathy  - Repeat UA  - Maintain net even fluid status  - Encourage PO intake. Also on parenteral nutrition  - Daily BMP  - Strict I&O, daily weights 79 yo M with prolonged hospital course, initially  admitted for SBO and CD flare s/p multiple post op complications now with recurrent  non-oliguric sATN 2/2 candidemia , with initial improvement however ,last 48hrs with relative hypotension and perfusion shifts with sCr now at 2.8, as  likely reperfusional related rise. 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    Plan (NEED FELLOW REVIEW):  - Ensure no drops in BP. Allow BP to be 130-150s systolic   - Maintain MAP>70 for adequate renal perfusion. If Cr worsens despite hemodynamic optimization, then may need bx given MGUS on serology, may have possible cast nephropathy  - Repeat UA  - Maintain net even fluid status  - Encourage PO intake. Also on parenteral nutrition  - Daily BMP  - Strict I&O, daily weights 79 yo M with prolonged hospital course, initially  admitted for SBO and CD flare s/p multiple post op complications now with recurrent  non-oliguric sATN 2/2 candidemia , with initial improvement however ,last 48hrs with relative hypotension and perfusion shifts with sCr now at 2.8, as  likely reperfusional related rise. 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    Plan   - Ensure no drops in BP. Allow BP to be 130-150s systolic   - Maintain MAP>70 for adequate renal perfusion. If Cr worsens despite hemodynamic optimization, then may need bx given MGUS on serology, may have possible cast nephropathy  - Repeat UA  - Maintain net even fluid status  - Encourage PO intake. Also on parenteral nutrition  - Daily BMP  - Strict I&O, daily weights

## 2023-12-12 NOTE — PROGRESS NOTE ADULT - ASSESSMENT
78M w PMH Crohn's, AFib/Flutter s/p DCCVs on amiodarone, remote ileocectomy and open appendectomy. Admitted (6/23) for SBO vs Crohns flare, s/p NGT decompression and s/p lap converted to open TRE, SBR x 3, left in discontinuity with abthera vac on (6/27), RTOR for ileocolic resection, small bowel anastomosis, and abdominal wall closure on (6/28), c/b fluid collection s/p IR aspiration of perihepatic fluid on (7/3), c/b wound dehiscence s/p RTOR exlap, washout, ileocolic resection with end ileostomy, blow hole colostomy, red rubber from ileostomy to small bowel anastomosis; vicryl bridging mesh on (7/5) transferred to SICU postoperatively for hemodynamic monitoring, with hospital course complicated by periods of severe ELVI and hyponatremia, which resolved but stepped back up for to SICU on (9/10) for acute AMS, intermittent hypoglycemia, AFib with RVR. Percutaneous Cholecystostomy placed on (9/11) for enlarged GB, PCT capped 10/5 and uncapped 10/25 for hyperbilirubinemia, Right brachial DVT, left basillic and cephalic superficial thrombus on duplex 11/2, CT 11/14 with ALDEN ground glass opacity of unclear etiology, completed empiric 7day cefepime course 11/22, rising T-bili of unclear etilogy, now w resolved candida glabrata fungemia and ELVI.    NEURO: AMS resolved, likely septic encephalopathy, EEG neg. Hx of depression - cont Effexor. Nausea: Zofran PRN. Anxiety: Lorazepam PRN.  CV: Hx Afib/flutter: s/p DCCV, Amio stopped due to persistent transaminitis. Continue Metoprolol 5q6 with hold parameters. Hx HLD: Lipitor held given high LFTs. TTE  (7/18) - PASP 64mmHg, EF 65-70%, Hx HTN - stopped PO Norvasc as unlikely absorbing, holding Losartan. BP borderline - continue holding hydralazine.   PULM: CT from 11/14 with ALDEN ground glass opacity of unclear etiology. COVID negative. RVP negative. completed 7d empiric cefepime 11/22 to cover for potential PNA. Dyspnea improved.  GI/FEN: Low res, low fat, high protein diet. Cont Ensure max x1/day. fungemia likely CLABSI. PICC replaced on 12/4 and continue TPN qd, lipids MWF.  High output EC fistula: cont Octreotide & Cholestyramine 10/18. Transaminitis elevated T bili of unclear origin, consulted Team 1. GI deferring decision pending further evaluation by hepatology team. s/p Perc Deb on 9/11; MRCP 10/30 no obstruction, seen by Hepatology started on ursodiol, RUQ US 11/25 CBD 5mm, hepatic vessels patent Repeat. MRCP neg. Monitor drainage from fistula, bolus as needed to keep I&O even. Ensure once daily with LPS. Folate, thiamine.  : Voids. ELVI, Cr rising today. d/c famotidine and half Effecor dose given renal dysfunction. Caspo unlikely etiology of ELVI per ID. s/p brief bicarb gtt.  MARLO Duplex and RP US unremarkable, CK wnl.  f/u renal recs pending glomerulonephritis labs.  ENDO: Hx hypothyroid: IV Synthroid, TSH low on 11/30, decreased synthroid dose, recheck TSH 12/6 wnl.  ID: BCx 11/22 grew candida glabrata, likely CLABSI. ID consulted, s/p Caspo, switched to Fluconazole (12/1-12/9). Repeat surveillance blood cx NGTD. Ophthalmology evaulated - normal ocular exam. Echo no vegetation. PICC replaced on 12/4.  Recent MRSA swab negative, RVP panel negative, COVID negative. Cont Nystatin powder // PREVIOUSLY  caspofungin (11/24-12/1). completed empiric 7days cefepime (11/15-11/22), had C. tertium, Lactobacillis from his IR cx 7/3, and candida albicans, lactobacillus, vanc sensitive E faecium, vanc resistant E gallinarum, vanc resistant E casseliflavus, lactobacillus from his OR cx 7/5. Completed course of abx with Imipenem (9/10-9/15). Imipenem (8/26--) imipenem (6/30-7/12, 7/23-7/30), Dapto (6/30-7/5 and 7/23-7/24). Empiric dapto (8/23-24) and cefepime (8/23-24).   PPX: SCDs, SQH, was on Therapeutic lovenox bid for R brachial vein DVT, but will hold off on hep gtt since repeat US Duplex negative.  HEME: Anemia - continue Epogen weekly. S/p Iron Sucrose 200 qd x 3 days for chronic anemia.  LINES: PIVs, LUE PICC (12/4 - ) // DC: LUE PICC (9/1-11/1 ), RUE PICC: (11/1-11/24)   WOUNDS/DRAINS: Abdominal wound and fistula with wound manager in place dressing change Tuesday and Friday. RLQ Ostomy. IR perc deb tube. // DC: L Clay drain.  PT: Ambulate as tolerated   DISPO: SICU due to complicated hospital course.

## 2023-12-12 NOTE — PROGRESS NOTE ADULT - SUBJECTIVE AND OBJECTIVE BOX
SUBJECTIVE:  12/12: TPN ordered. Bipap ordered. IS, for shortness of breath  Albumin 250 in AM and PM. CXR okay. .    ON: PRN hydralazine given at 0400     Continues to feel fatigued and nauseated. Tolerating PO intake.     MEDICATIONS  (STANDING):  albumin human  5% IVPB 250 milliLiter(s) IV Intermittent once  chlorhexidine 2% Cloths 1 Application(s) Topical <User Schedule>  cholestyramine Powder (Sugar-Free) 4 Gram(s) Oral daily  epoetin matt-epbx (RETACRIT) Injectable 25525 Unit(s) SubCutaneous every 7 days  ergocalciferol 96009 Unit(s) Oral <User Schedule>  glucagon  Injectable 1 milliGRAM(s) IntraMuscular once  heparin   Injectable 5000 Unit(s) SubCutaneous every 8 hours  levothyroxine Injectable 30 MICROGram(s) IV Push at bedtime  metoprolol tartrate Injectable 5 milliGRAM(s) IV Push every 6 hours  Parenteral Nutrition - Adult 1 Each TPN Continuous <Continuous>  Parenteral Nutrition - Adult 1 Each TPN Continuous <Continuous>  ursodiol Suspension 300 milliGRAM(s) Oral every 8 hours  venlafaxine XR. 150 milliGRAM(s) Oral daily    MEDICATIONS  (PRN):  hydrALAZINE Injectable 10 milliGRAM(s) IV Push every 6 hours PRN SBP >170  lidocaine 2% Viscous 10 milliLiter(s) Swish and Spit every 12 hours PRN tongue pain  LORazepam   Injectable 0.5 milliGRAM(s) IV Push at bedtime PRN Anxiety  ondansetron Injectable 4 milliGRAM(s) IV Push every 6 hours PRN Nausea and/or Vomiting      Vital Signs Last 24 Hrs  T(C): 36.8 (12 Dec 2023 13:00), Max: 37.6 (11 Dec 2023 22:14)  T(F): 98.3 (12 Dec 2023 13:00), Max: 99.6 (11 Dec 2023 22:14)  HR: 91 (12 Dec 2023 14:00) (87 - 92)  BP: 138/63 (12 Dec 2023 14:00) (138/62 - 179/77)  BP(mean): 90 (12 Dec 2023 14:00) (89 - 110)  RR: 20 (12 Dec 2023 14:00) (20 - 28)  SpO2: 99% (12 Dec 2023 14:00) (92% - 100%)    Parameters below as of 12 Dec 2023 14:00  Patient On (Oxygen Delivery Method): BiPAP/CPAP    O2 Concentration (%): 40    GENERAL: NAD, resting comfortably in bed  HEENT: NCAT, MMM. +scleral icterus.   C/V: Normal rate, normal peripheral perfusion. No murmurs.   PULM: Nonlabored breathing, no respiratory distress, CTA.   ABD: Soft, ND, NT, no rebound tenderness, no guarding. PCT draining to bag, brown bile.   EXTREM: WWP, no edema, SCDs in place  NEURO: No focal deficits  I&O's Summary    11 Dec 2023 07:01  -  12 Dec 2023 07:00  --------------------------------------------------------  IN: 3639.8 mL / OUT: 4325 mL / NET: -685.2 mL    12 Dec 2023 07:01  -  12 Dec 2023 15:20  --------------------------------------------------------  IN: 1180.2 mL / OUT: 655 mL / NET: 525.2 mL        LABS:                        8.7    7.51  )-----------( 188      ( 11 Dec 2023 12:17 )             27.7     12-12    134<L>  |  102  |  45<H>  ----------------------------<  131<H>  4.4   |  23  |  2.36<H>    Ca    8.5      12 Dec 2023 05:17  Phos  2.4     12-12  Mg     2.3     12-12    TPro  8.6<H>  /  Alb  2.4<L>  /  TBili  7.1<H>  /  DBili  4.9<H>  /  AST  125<H>  /  ALT  74<H>  /  AlkPhos  136<H>  12-12      Urinalysis Basic - ( 12 Dec 2023 05:17 )    Color: x / Appearance: x / SG: x / pH: x  Gluc: 131 mg/dL / Ketone: x  / Bili: x / Urobili: x   Blood: x / Protein: x / Nitrite: x   Leuk Esterase: x / RBC: x / WBC x   Sq Epi: x / Non Sq Epi: x / Bacteria: x      CAPILLARY BLOOD GLUCOSE        LIVER FUNCTIONS - ( 12 Dec 2023 05:17 )  Alb: 2.4 g/dL / Pro: 8.6 g/dL / ALK PHOS: 136 U/L / ALT: 74 U/L / AST: 125 U/L / GGT: x             RADIOLOGY & ADDITIONAL STUDIES:   SUBJECTIVE:  12/12: TPN ordered. Bipap ordered. IS, for shortness of breath  Albumin 250 in AM and PM. CXR okay. .    ON: PRN hydralazine given at 0400     Continues to feel fatigued and nauseated. Tolerating PO intake.     MEDICATIONS  (STANDING):  albumin human  5% IVPB 250 milliLiter(s) IV Intermittent once  chlorhexidine 2% Cloths 1 Application(s) Topical <User Schedule>  cholestyramine Powder (Sugar-Free) 4 Gram(s) Oral daily  epoetin matt-epbx (RETACRIT) Injectable 09682 Unit(s) SubCutaneous every 7 days  ergocalciferol 75414 Unit(s) Oral <User Schedule>  glucagon  Injectable 1 milliGRAM(s) IntraMuscular once  heparin   Injectable 5000 Unit(s) SubCutaneous every 8 hours  levothyroxine Injectable 30 MICROGram(s) IV Push at bedtime  metoprolol tartrate Injectable 5 milliGRAM(s) IV Push every 6 hours  Parenteral Nutrition - Adult 1 Each TPN Continuous <Continuous>  Parenteral Nutrition - Adult 1 Each TPN Continuous <Continuous>  ursodiol Suspension 300 milliGRAM(s) Oral every 8 hours  venlafaxine XR. 150 milliGRAM(s) Oral daily    MEDICATIONS  (PRN):  hydrALAZINE Injectable 10 milliGRAM(s) IV Push every 6 hours PRN SBP >170  lidocaine 2% Viscous 10 milliLiter(s) Swish and Spit every 12 hours PRN tongue pain  LORazepam   Injectable 0.5 milliGRAM(s) IV Push at bedtime PRN Anxiety  ondansetron Injectable 4 milliGRAM(s) IV Push every 6 hours PRN Nausea and/or Vomiting      Vital Signs Last 24 Hrs  T(C): 36.8 (12 Dec 2023 13:00), Max: 37.6 (11 Dec 2023 22:14)  T(F): 98.3 (12 Dec 2023 13:00), Max: 99.6 (11 Dec 2023 22:14)  HR: 91 (12 Dec 2023 14:00) (87 - 92)  BP: 138/63 (12 Dec 2023 14:00) (138/62 - 179/77)  BP(mean): 90 (12 Dec 2023 14:00) (89 - 110)  RR: 20 (12 Dec 2023 14:00) (20 - 28)  SpO2: 99% (12 Dec 2023 14:00) (92% - 100%)    Parameters below as of 12 Dec 2023 14:00  Patient On (Oxygen Delivery Method): BiPAP/CPAP    O2 Concentration (%): 40    GENERAL: NAD, resting comfortably in bed  HEENT: NCAT, MMM. +scleral icterus.   C/V: Normal rate, normal peripheral perfusion. No murmurs.   PULM: Nonlabored breathing, no respiratory distress, CTA.   ABD: Soft, ND, NT, no rebound tenderness, no guarding. PCT draining to bag, brown bile.   EXTREM: WWP, no edema, SCDs in place  NEURO: No focal deficits  I&O's Summary    11 Dec 2023 07:01  -  12 Dec 2023 07:00  --------------------------------------------------------  IN: 3639.8 mL / OUT: 4325 mL / NET: -685.2 mL    12 Dec 2023 07:01  -  12 Dec 2023 15:20  --------------------------------------------------------  IN: 1180.2 mL / OUT: 655 mL / NET: 525.2 mL        LABS:                        8.7    7.51  )-----------( 188      ( 11 Dec 2023 12:17 )             27.7     12-12    134<L>  |  102  |  45<H>  ----------------------------<  131<H>  4.4   |  23  |  2.36<H>    Ca    8.5      12 Dec 2023 05:17  Phos  2.4     12-12  Mg     2.3     12-12    TPro  8.6<H>  /  Alb  2.4<L>  /  TBili  7.1<H>  /  DBili  4.9<H>  /  AST  125<H>  /  ALT  74<H>  /  AlkPhos  136<H>  12-12      Urinalysis Basic - ( 12 Dec 2023 05:17 )    Color: x / Appearance: x / SG: x / pH: x  Gluc: 131 mg/dL / Ketone: x  / Bili: x / Urobili: x   Blood: x / Protein: x / Nitrite: x   Leuk Esterase: x / RBC: x / WBC x   Sq Epi: x / Non Sq Epi: x / Bacteria: x      CAPILLARY BLOOD GLUCOSE        LIVER FUNCTIONS - ( 12 Dec 2023 05:17 )  Alb: 2.4 g/dL / Pro: 8.6 g/dL / ALK PHOS: 136 U/L / ALT: 74 U/L / AST: 125 U/L / GGT: x             RADIOLOGY & ADDITIONAL STUDIES:

## 2023-12-12 NOTE — CHART NOTE - NSCHARTNOTEFT_GEN_A_CORE
Admitting Diagnosis:   Patient is a 78y old  Male who presents with a chief complaint of sbo (01 Dec 2023 07:22)      PAST MEDICAL & SURGICAL HISTORY:  Essential hypertension  Hypertension      Atrial fibrillation  s/p cardioversion 2010 and 2014  Pt. reports 4 DCCV  Now on Amiodarone 200mg PO bid and Eliquis 5mg PO bid  Last DCCV 4 yrs ago at [facility]      Crohn's disease  s/p partial resection of ileum      Hyperlipidemia      Hypothyroidism      History of depression  On Venlafaxine ER 150mg PO bid      Junctional rhythm      H/O knee surgery      History of cataract surgery          Current Nutrition Order:   TPN via PICC- 1.8L bag running over 18hrs (70ml/hr), providing 411g Dex, 144g AA, 80g Omegaven lipids; provides 2303.4 kcals, 144g protein, GIR 2.66 mg/kg/min   PO- Low fiber, Low fat diet + 2 LPS per day [provide 100 kcals, 15g protein each], + Ensure Max daily [150 kcals, 20g protein]    PO Intake: Good (%) [   ]  Fair (50-75%) [ x  ] Poor (<25%) [   ]    GI Issues:   12/5: ileostomy output 35cc x 24hrs, abdominal wound/fistula output  250cc x 24hrs, per cony output 745cc x 24hrs      12/1: ileostomy output 30cc x 24hrs, abdominal wound/fistula output  2400cc x 24hrs, per cony output 550cc x 24hrs     11/27: ileostomy output 50 cc x 24hrs, abdominal wound/fistula output 2175 cc x 24hrs, per cony output 530 cc x 24hrs     11/22: ileostomy output 25cc x 24hrs, abdominal wound/fistula output 2350 cc x 24hrs, per cony output 775cc x 24hrs     11/20: ileostomy output 55cc x 24hrs, abdominal wound/fistula output 2025 cc x 24hrs, per cony output 450cc x 24hrs     11/15:  ileostomy output 65cc x 24hrs, abdominal wound/fistula output 875cc x 24hrs, per cony output 775cc x 24hrs  Pain:    Skin Integrity:    Labs:   12-12    134<L>  |  102  |  45<H>  ----------------------------<  131<H>  4.4   |  23  |  2.36<H>    Ca    8.5      12 Dec 2023 05:17  Phos  2.4     12-12  Mg     2.3     12-12    TPro  8.6<H>  /  Alb  2.4<L>  /  TBili  7.1<H>  /  DBili  4.9<H>  /  AST  125<H>  /  ALT  74<H>  /  AlkPhos  136<H>  12-12    CAPILLARY BLOOD GLUCOSE          Medications:  MEDICATIONS  (STANDING):  albumin human  5% IVPB 250 milliLiter(s) IV Intermittent once  chlorhexidine 2% Cloths 1 Application(s) Topical <User Schedule>  cholestyramine Powder (Sugar-Free) 4 Gram(s) Oral daily  epoetin matt-epbx (RETACRIT) Injectable 92931 Unit(s) SubCutaneous every 7 days  ergocalciferol 62329 Unit(s) Oral <User Schedule>  glucagon  Injectable 1 milliGRAM(s) IntraMuscular once  heparin   Injectable 5000 Unit(s) SubCutaneous every 8 hours  levothyroxine Injectable 30 MICROGram(s) IV Push at bedtime  metoprolol tartrate Injectable 5 milliGRAM(s) IV Push every 6 hours  Parenteral Nutrition - Adult 1 Each TPN Continuous <Continuous>  Parenteral Nutrition - Adult 1 Each TPN Continuous <Continuous>  ursodiol Suspension 300 milliGRAM(s) Oral every 8 hours  venlafaxine XR. 150 milliGRAM(s) Oral daily    MEDICATIONS  (PRN):  hydrALAZINE Injectable 10 milliGRAM(s) IV Push every 6 hours PRN SBP >170  lidocaine 2% Viscous 10 milliLiter(s) Swish and Spit every 12 hours PRN tongue pain  LORazepam   Injectable 0.5 milliGRAM(s) IV Push at bedtime PRN Anxiety  ondansetron Injectable 4 milliGRAM(s) IV Push every 6 hours PRN Nausea and/or Vomiting    Daily 93.3kg [11 Dec 2023]  Daily 93.3kg [08 Dec 2023]  Daily 93.3kg [6 Dec 2023]  Daily 94.2kg [28 Nov 2023]  Daily 94.4kg [27 Nov 2023]  Daily 94.2kg [26 Nov 2023]  Daily 94.2kg [22 Nov 2023]  Daily 94.2kg [16 Nov 2023]  Daily 94.2 [15 Nov 2023]  Daily 94.2kg [13 Nov 2023]  Daily 95.8kg [09 Nov 2023]  Daily 94.2kg [07 Nov 2023]  Daily 85.2kg [03 Nov 2023]  Daily 85.2kg [31 Oct 2023]  Daily 85.2kg [24 Oct 2023]  Daily 85.2kg [20 Oct 2023]  Daily 81.9kg [18 Oct 2023]  Daily 85.2kg [16 Oct 2023]  Daily   95.2kg [12 Oct 2023]   Daily 87.7kg [11 Oct 2023]  Daily 87.4kg [09 Oct 2023]  Daily 86.4kg [07 Oct 2023]  Daily 82.5kg [06 Oct 2023]  Daily 82.6kg [4 Oct 2023]   Daily 83.5kg [03 Oct 2023]  Daily 84.5kg [2 Oct 2023]  Daily 87.2kg [1 Oct 2023]  Daily 88.3kg [29 Sep 2023]  Daily 89.9kg [28 Sep 2023]  Daily 87.7kg [24 Sep 2023]  Daily 90.4kg [21 Sep 2023]  Daily 91.4kg [20 Sep 2023]  Daily 86.7kg [18 Sep 2023]  Daily 88.1kg [16 Sep 2023]  Daily 88.9kg [15 Sep 2023]   Daily 89.1 [14 Sep 2023]  Daily 92.9kg [6 Sep 2023]  Daily 91.8kg [27 Aug 2023]  Daily 101kg [9 Aug 2023]       Weight Change: weight trending down throughout admission, now appears to be trending back up. Recommend continue weekly/daily weights for trending & ensuring adequacy of nutrition provision    IBW: 86.4kg  % IBW: 109.0%      Estimated energy needs:     Subjective:     Previous Nutrition Diagnosis:    Active [   ]  Resolved [   ]    If resolved, new PES:     Goal:    Recommendations:    Education:     Risk Level: High [   ] Moderate [   ] Low [   ] Admitting Diagnosis:   Patient is a 78y old  Male who presents with a chief complaint of sbo (01 Dec 2023 07:22)      PAST MEDICAL & SURGICAL HISTORY:  Essential hypertension  Hypertension      Atrial fibrillation  s/p cardioversion 2010 and 2014  Pt. reports 4 DCCV  Now on Amiodarone 200mg PO bid and Eliquis 5mg PO bid  Last DCCV 4 yrs ago at [facility]      Crohn's disease  s/p partial resection of ileum      Hyperlipidemia      Hypothyroidism      History of depression  On Venlafaxine ER 150mg PO bid      Junctional rhythm      H/O knee surgery      History of cataract surgery          Current Nutrition Order:   TPN via PICC- 1.8L bag running over 18hrs (70ml/hr), providing 411g Dex, 144g AA, 80g Omegaven lipids; provides 2303.4 kcals, 144g protein, GIR 2.66 mg/kg/min   PO- Low fiber, Low fat diet + 2 LPS per day [provide 100 kcals, 15g protein each], + Ensure Max daily [150 kcals, 20g protein]    PO Intake: Good (%) [   ]  Fair (50-75%) [ x  ] Poor (<25%) [   ]    GI Issues:   12/5: ileostomy output 35cc x 24hrs, abdominal wound/fistula output  250cc x 24hrs, per cony output 745cc x 24hrs      12/1: ileostomy output 30cc x 24hrs, abdominal wound/fistula output  2400cc x 24hrs, per cony output 550cc x 24hrs     11/27: ileostomy output 50 cc x 24hrs, abdominal wound/fistula output 2175 cc x 24hrs, per cony output 530 cc x 24hrs     11/22: ileostomy output 25cc x 24hrs, abdominal wound/fistula output 2350 cc x 24hrs, per cony output 775cc x 24hrs     11/20: ileostomy output 55cc x 24hrs, abdominal wound/fistula output 2025 cc x 24hrs, per cony output 450cc x 24hrs     11/15:  ileostomy output 65cc x 24hrs, abdominal wound/fistula output 875cc x 24hrs, per cony output 775cc x 24hrs  Pain:    Skin Integrity:    Labs:   12-12    134<L>  |  102  |  45<H>  ----------------------------<  131<H>  4.4   |  23  |  2.36<H>    Ca    8.5      12 Dec 2023 05:17  Phos  2.4     12-12  Mg     2.3     12-12    TPro  8.6<H>  /  Alb  2.4<L>  /  TBili  7.1<H>  /  DBili  4.9<H>  /  AST  125<H>  /  ALT  74<H>  /  AlkPhos  136<H>  12-12    CAPILLARY BLOOD GLUCOSE          Medications:  MEDICATIONS  (STANDING):  albumin human  5% IVPB 250 milliLiter(s) IV Intermittent once  chlorhexidine 2% Cloths 1 Application(s) Topical <User Schedule>  cholestyramine Powder (Sugar-Free) 4 Gram(s) Oral daily  epoetin matt-epbx (RETACRIT) Injectable 00889 Unit(s) SubCutaneous every 7 days  ergocalciferol 10469 Unit(s) Oral <User Schedule>  glucagon  Injectable 1 milliGRAM(s) IntraMuscular once  heparin   Injectable 5000 Unit(s) SubCutaneous every 8 hours  levothyroxine Injectable 30 MICROGram(s) IV Push at bedtime  metoprolol tartrate Injectable 5 milliGRAM(s) IV Push every 6 hours  Parenteral Nutrition - Adult 1 Each TPN Continuous <Continuous>  Parenteral Nutrition - Adult 1 Each TPN Continuous <Continuous>  ursodiol Suspension 300 milliGRAM(s) Oral every 8 hours  venlafaxine XR. 150 milliGRAM(s) Oral daily    MEDICATIONS  (PRN):  hydrALAZINE Injectable 10 milliGRAM(s) IV Push every 6 hours PRN SBP >170  lidocaine 2% Viscous 10 milliLiter(s) Swish and Spit every 12 hours PRN tongue pain  LORazepam   Injectable 0.5 milliGRAM(s) IV Push at bedtime PRN Anxiety  ondansetron Injectable 4 milliGRAM(s) IV Push every 6 hours PRN Nausea and/or Vomiting    Daily 93.3kg [11 Dec 2023]  Daily 93.3kg [08 Dec 2023]  Daily 93.3kg [6 Dec 2023]  Daily 94.2kg [28 Nov 2023]  Daily 94.4kg [27 Nov 2023]  Daily 94.2kg [26 Nov 2023]  Daily 94.2kg [22 Nov 2023]  Daily 94.2kg [16 Nov 2023]  Daily 94.2 [15 Nov 2023]  Daily 94.2kg [13 Nov 2023]  Daily 95.8kg [09 Nov 2023]  Daily 94.2kg [07 Nov 2023]  Daily 85.2kg [03 Nov 2023]  Daily 85.2kg [31 Oct 2023]  Daily 85.2kg [24 Oct 2023]  Daily 85.2kg [20 Oct 2023]  Daily 81.9kg [18 Oct 2023]  Daily 85.2kg [16 Oct 2023]  Daily   95.2kg [12 Oct 2023]   Daily 87.7kg [11 Oct 2023]  Daily 87.4kg [09 Oct 2023]  Daily 86.4kg [07 Oct 2023]  Daily 82.5kg [06 Oct 2023]  Daily 82.6kg [4 Oct 2023]   Daily 83.5kg [03 Oct 2023]  Daily 84.5kg [2 Oct 2023]  Daily 87.2kg [1 Oct 2023]  Daily 88.3kg [29 Sep 2023]  Daily 89.9kg [28 Sep 2023]  Daily 87.7kg [24 Sep 2023]  Daily 90.4kg [21 Sep 2023]  Daily 91.4kg [20 Sep 2023]  Daily 86.7kg [18 Sep 2023]  Daily 88.1kg [16 Sep 2023]  Daily 88.9kg [15 Sep 2023]   Daily 89.1 [14 Sep 2023]  Daily 92.9kg [6 Sep 2023]  Daily 91.8kg [27 Aug 2023]  Daily 101kg [9 Aug 2023]       Weight Change: weight trending down throughout admission, now appears to be trending back up. Recommend continue weekly/daily weights for trending & ensuring adequacy of nutrition provision    IBW: 86.4kg  % IBW: 109.0%      Estimated energy needs:     Subjective:     Previous Nutrition Diagnosis:    Active [   ]  Resolved [   ]    If resolved, new PES:     Goal:    Recommendations:    Education:     Risk Level: High [   ] Moderate [   ] Low [   ] Admitting Diagnosis:   Patient is a 78y old  Male who presents with a chief complaint of sbo (01 Dec 2023 07:22)      PAST MEDICAL & SURGICAL HISTORY:  Essential hypertension  Hypertension      Atrial fibrillation  s/p cardioversion 2010 and 2014  Pt. reports 4 DCCV  Now on Amiodarone 200mg PO bid and Eliquis 5mg PO bid  Last DCCV 4 yrs ago at [facility]      Crohn's disease  s/p partial resection of ileum      Hyperlipidemia      Hypothyroidism      History of depression  On Venlafaxine ER 150mg PO bid      Junctional rhythm      H/O knee surgery      History of cataract surgery          Current Nutrition Order:   TPN via PICC- 1.8L bag running over 18hrs (100ml/hr), providing 460g Dex, 100g AA, 50g SMOF lipids [MWF]; provides average of 2178.3 kcals, 100g protein daily, GIR 3.42 mg/kg/min   PO- Low fiber, Low fat diet + 2 LPS per day [provide 100 kcals, 15g protein each], + Ensure Max daily [150 kcals, 20g protein]    PO Intake: Good (%) [   ]  Fair (50-75%) [ x  ] Poor (<25%) [   ]    GI Issues:   12/12: ileostomy output 75cc x 24hrs, abdominal wound/fistula output  600cc x 24hrs, per cony output 1125cc x 24hrs      12/5: ileostomy output 35cc x 24hrs, abdominal wound/fistula output  250cc x 24hrs, per cony output 745cc x 24hrs      12/1: ileostomy output 30cc x 24hrs, abdominal wound/fistula output  2400cc x 24hrs, per cony output 550cc x 24hrs     11/27: ileostomy output 50 cc x 24hrs, abdominal wound/fistula output 2175 cc x 24hrs, per cony output 530 cc x 24hrs     11/22: ileostomy output 25cc x 24hrs, abdominal wound/fistula output 2350 cc x 24hrs, per cony output 775cc x 24hrs     11/20: ileostomy output 55cc x 24hrs, abdominal wound/fistula output 2025 cc x 24hrs, per cony output 450cc x 24hrs     11/15:  ileostomy output 65cc x 24hrs, abdominal wound/fistula output 875cc x 24hrs, per cony output 775cc x 24hrs    Pain: no pain/discomfort noted    Skin Integrity:  jaundice, abd wound and fistula with wound manager in place, RLQ ileostomy, perc cony drain, lower back/healed wound, Rudy score 15    Labs:   12-12    134<L>  |  102  |  45<H>  ----------------------------<  131<H>  4.4   |  23  |  2.36<H>    Ca    8.5      12 Dec 2023 05:17  Phos  2.4     12-12  Mg     2.3     12-12    TPro  8.6<H>  /  Alb  2.4<L>  /  TBili  7.1<H>  /  DBili  4.9<H>  /  AST  125<H>  /  ALT  74<H>  /  AlkPhos  136<H>  12-12    CAPILLARY BLOOD GLUCOSE          Medications:  MEDICATIONS  (STANDING):  albumin human  5% IVPB 250 milliLiter(s) IV Intermittent once  chlorhexidine 2% Cloths 1 Application(s) Topical <User Schedule>  cholestyramine Powder (Sugar-Free) 4 Gram(s) Oral daily  epoetin matt-epbx (RETACRIT) Injectable 43976 Unit(s) SubCutaneous every 7 days  ergocalciferol 90076 Unit(s) Oral <User Schedule>  glucagon  Injectable 1 milliGRAM(s) IntraMuscular once  heparin   Injectable 5000 Unit(s) SubCutaneous every 8 hours  levothyroxine Injectable 30 MICROGram(s) IV Push at bedtime  metoprolol tartrate Injectable 5 milliGRAM(s) IV Push every 6 hours  Parenteral Nutrition - Adult 1 Each TPN Continuous <Continuous>  Parenteral Nutrition - Adult 1 Each TPN Continuous <Continuous>  ursodiol Suspension 300 milliGRAM(s) Oral every 8 hours  venlafaxine XR. 150 milliGRAM(s) Oral daily    MEDICATIONS  (PRN):  hydrALAZINE Injectable 10 milliGRAM(s) IV Push every 6 hours PRN SBP >170  lidocaine 2% Viscous 10 milliLiter(s) Swish and Spit every 12 hours PRN tongue pain  LORazepam   Injectable 0.5 milliGRAM(s) IV Push at bedtime PRN Anxiety  ondansetron Injectable 4 milliGRAM(s) IV Push every 6 hours PRN Nausea and/or Vomiting    Daily 93.3kg [11 Dec 2023]  Daily 93.3kg [08 Dec 2023]  Daily 93.3kg [6 Dec 2023]  Daily 94.2kg [28 Nov 2023]  Daily 94.4kg [27 Nov 2023]  Daily 94.2kg [26 Nov 2023]  Daily 94.2kg [22 Nov 2023]  Daily 94.2kg [16 Nov 2023]  Daily 94.2 [15 Nov 2023]  Daily 94.2kg [13 Nov 2023]  Daily 95.8kg [09 Nov 2023]  Daily 94.2kg [07 Nov 2023]  Daily 85.2kg [03 Nov 2023]  Daily 85.2kg [31 Oct 2023]  Daily 85.2kg [24 Oct 2023]  Daily 85.2kg [20 Oct 2023]  Daily 81.9kg [18 Oct 2023]  Daily 85.2kg [16 Oct 2023]  Daily   95.2kg [12 Oct 2023]   Daily 87.7kg [11 Oct 2023]  Daily 87.4kg [09 Oct 2023]  Daily 86.4kg [07 Oct 2023]  Daily 82.5kg [06 Oct 2023]  Daily 82.6kg [4 Oct 2023]   Daily 83.5kg [03 Oct 2023]  Daily 84.5kg [2 Oct 2023]  Daily 87.2kg [1 Oct 2023]  Daily 88.3kg [29 Sep 2023]  Daily 89.9kg [28 Sep 2023]  Daily 87.7kg [24 Sep 2023]  Daily 90.4kg [21 Sep 2023]  Daily 91.4kg [20 Sep 2023]  Daily 86.7kg [18 Sep 2023]  Daily 88.1kg [16 Sep 2023]  Daily 88.9kg [15 Sep 2023]   Daily 89.1 [14 Sep 2023]  Daily 92.9kg [6 Sep 2023]  Daily 91.8kg [27 Aug 2023]  Daily 101kg [9 Aug 2023]       Weight Change: weight trending down throughout admission, now appears to be trending back up. Recommend continue weekly/daily weights for trending & ensuring adequacy of nutrition provision    IBW: 86.4kg  % IBW: 108.0    Estimated energy needs:   Ideal body weight (86.4kg) used for calculations as pt >100% IBW and BMI <30 per [facility] Standards of Care. Needs estimated for age and adjusted for current clinical status, increased needs for post-op & open abd wound healing    Calories: 8169-9516 kcals based on 30-40 kcals/kg  Protein: 129.6-172.8 g protein based on 1.5-2.0g protein/kg + additional depending on outputs  *Fluid needs per team    Subjective:   78 M w/ Crohn's, AFib/Flutter s/p DCCVs on amiodarone, remote ileocectomy and open appendectomy. Admitted (6/23) for SBO vs Crohns flare, s/p NGT decompression and s/p lap TRE converted to open TRE, SBR x 3, left in discontinuity with abthera vac on (6/27), RTOR for ileocolic resection, small bowel anastomosis, and abdominal wall closure on (6/28), c/b fluid collection s/p IR aspiration of perihepatic fluid on (7/3), c/b wound dehiscence s/p RTOR exlap, washout, ileocolic resection with end ileostomy, blow hole colostomy, red rubber from ileostomy to small bowel anastomosis; vicryl bridging mesh on (7/5) transferred to SICU postoperatively for hemodynamic monitoring, with hospital course complicated by periods of AMS most recently on 9/1 and associated with oliguria, severe ELVI and hyponatremia, which resolved but stepped back up for to SICU on 9/10 for acute AMS, intermittent hypoglycemia, AFib with RVR. Percutaneous Cholecystostomy placed on 9/11 for enlarged GB, PCT capped 10/5.    Chart reviewed. Pt seen at bedside on 5 EA with IDT during AM rounds. TPN restarted on TPN 12/4, lipids every MWF. 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 Unable to quantify kcal/protein obtained via PO diet due to inadequate lumen for absorption and high fistula outputs. Recommend to continue parenteral nutrition as primary means to nutrition. Labs reviewed- calcitriol 8.4 <L>, vitamin D 14.1 <L>- on PO supplementation [? absorption], Na 134 <L>, BUN 45 <H> [increasing], Creat 2.36 <H>, direct bilirubin 4.9 <H>, total bilirubin 7.1 <H>,  <H>, ALT 74 <H>, Phos 2.4 <L>- needs repletion. Meds- on zofran prn, synthroid [administer 30-60 minutes before food; separate at least 4hrs from calcium or iron-containing products or bile acid sequestrants], 50,000 units ergocalciferol weekly, EPO, ursodiol, cholestyramine.     Previous Nutrition Diagnosis:  Increased Nutrient Needs R/T physiological demands for nutrient AEB post-op wound healing    Active [ X  ]  Resolved [   ]    If resolved, new PES:     Goal:   Pt will meet at least 75% of protein & energy needs via most appropriate route for nutrition     Recommendations:  1. c/w TPN via PICC as primary means to nutrition - 460g Dex, 100g AA, 50g SMOF lipids [MWF schedule]; provides average of 2178.3 kcals, 100g protein daily, GIR 3.42 mg/kg/min   - recommend cycling TPN over 12-16hrs as feasible  - monitor weights & wound healing, adjust substrates as indicated  - fluid & lytes per team  2. Continue Vit D supplementation  - recommend to recheck levels 12/28  3. PO diet per team discretion  - consider liberalization to regular diet as medically feasible to allow for more menu options  - c/w 1 LPS BID [200 kcals, 30g protein] + 1 Ensure Max daily [150 kcals, 30g protein]  - consider NPO for bowel rest as indicated   - ongoing education prn  5. Hydration per team  - risk for dehydration with high outputs, monitor  - additional fluids per team  6. Monitor renal function  - adjust protein in TPN prn  7. Fluids & lytes per team  - consider oral rehydration solution prn  8. Weekly lipid panel while on TPN  - hold/decrease lipids if TG >400  9. Monitor BMP/Mg/Phos, POC BG while on TPN  - monitor & replenish lytes outside TPN bag prn  10. Continue close monitoring of clinical course & adjust recommendations prn    Education: current nutrition provision    Risk Level: High [ X  ] Moderate [   ] Low [   ] Admitting Diagnosis:   Patient is a 78y old  Male who presents with a chief complaint of sbo (01 Dec 2023 07:22)      PAST MEDICAL & SURGICAL HISTORY:  Essential hypertension  Hypertension      Atrial fibrillation  s/p cardioversion 2010 and 2014  Pt. reports 4 DCCV  Now on Amiodarone 200mg PO bid and Eliquis 5mg PO bid  Last DCCV 4 yrs ago at       Crohn's disease  s/p partial resection of ileum      Hyperlipidemia      Hypothyroidism      History of depression  On Venlafaxine ER 150mg PO bid      Junctional rhythm      H/O knee surgery      History of cataract surgery          Current Nutrition Order:   TPN via PICC- 1.8L bag running over 18hrs (100ml/hr), providing 460g Dex, 100g AA, 50g SMOF lipids [MWF]; provides average of 2178.3 kcals, 100g protein daily, GIR 3.42 mg/kg/min   PO- Low fiber, Low fat diet + 2 LPS per day [provide 100 kcals, 15g protein each], + Ensure Max daily [150 kcals, 20g protein]    PO Intake: Good (%) [   ]  Fair (50-75%) [ x  ] Poor (<25%) [   ]    GI Issues:   12/12: ileostomy output 75cc x 24hrs, abdominal wound/fistula output  600cc x 24hrs, per cony output 1125cc x 24hrs      12/5: ileostomy output 35cc x 24hrs, abdominal wound/fistula output  250cc x 24hrs, per cony output 745cc x 24hrs      12/1: ileostomy output 30cc x 24hrs, abdominal wound/fistula output  2400cc x 24hrs, per cony output 550cc x 24hrs     11/27: ileostomy output 50 cc x 24hrs, abdominal wound/fistula output 2175 cc x 24hrs, per cony output 530 cc x 24hrs     11/22: ileostomy output 25cc x 24hrs, abdominal wound/fistula output 2350 cc x 24hrs, per cony output 775cc x 24hrs     11/20: ileostomy output 55cc x 24hrs, abdominal wound/fistula output 2025 cc x 24hrs, per cony output 450cc x 24hrs     11/15:  ileostomy output 65cc x 24hrs, abdominal wound/fistula output 875cc x 24hrs, per cony output 775cc x 24hrs    Pain: no pain/discomfort noted    Skin Integrity:  jaundice, abd wound and fistula with wound manager in place, RLQ ileostomy, perc cony drain, lower back/healed wound, Rudy score 15    Labs:   12-12    134<L>  |  102  |  45<H>  ----------------------------<  131<H>  4.4   |  23  |  2.36<H>    Ca    8.5      12 Dec 2023 05:17  Phos  2.4     12-12  Mg     2.3     12-12    TPro  8.6<H>  /  Alb  2.4<L>  /  TBili  7.1<H>  /  DBili  4.9<H>  /  AST  125<H>  /  ALT  74<H>  /  AlkPhos  136<H>  12-12    CAPILLARY BLOOD GLUCOSE          Medications:  MEDICATIONS  (STANDING):  albumin human  5% IVPB 250 milliLiter(s) IV Intermittent once  chlorhexidine 2% Cloths 1 Application(s) Topical <User Schedule>  cholestyramine Powder (Sugar-Free) 4 Gram(s) Oral daily  epoetin matt-epbx (RETACRIT) Injectable 11317 Unit(s) SubCutaneous every 7 days  ergocalciferol 53029 Unit(s) Oral <User Schedule>  glucagon  Injectable 1 milliGRAM(s) IntraMuscular once  heparin   Injectable 5000 Unit(s) SubCutaneous every 8 hours  levothyroxine Injectable 30 MICROGram(s) IV Push at bedtime  metoprolol tartrate Injectable 5 milliGRAM(s) IV Push every 6 hours  Parenteral Nutrition - Adult 1 Each TPN Continuous <Continuous>  Parenteral Nutrition - Adult 1 Each TPN Continuous <Continuous>  ursodiol Suspension 300 milliGRAM(s) Oral every 8 hours  venlafaxine XR. 150 milliGRAM(s) Oral daily    MEDICATIONS  (PRN):  hydrALAZINE Injectable 10 milliGRAM(s) IV Push every 6 hours PRN SBP >170  lidocaine 2% Viscous 10 milliLiter(s) Swish and Spit every 12 hours PRN tongue pain  LORazepam   Injectable 0.5 milliGRAM(s) IV Push at bedtime PRN Anxiety  ondansetron Injectable 4 milliGRAM(s) IV Push every 6 hours PRN Nausea and/or Vomiting    Daily 93.3kg [11 Dec 2023]  Daily 93.3kg [08 Dec 2023]  Daily 93.3kg [6 Dec 2023]  Daily 94.2kg [28 Nov 2023]  Daily 94.4kg [27 Nov 2023]  Daily 94.2kg [26 Nov 2023]  Daily 94.2kg [22 Nov 2023]  Daily 94.2kg [16 Nov 2023]  Daily 94.2 [15 Nov 2023]  Daily 94.2kg [13 Nov 2023]  Daily 95.8kg [09 Nov 2023]  Daily 94.2kg [07 Nov 2023]  Daily 85.2kg [03 Nov 2023]  Daily 85.2kg [31 Oct 2023]  Daily 85.2kg [24 Oct 2023]  Daily 85.2kg [20 Oct 2023]  Daily 81.9kg [18 Oct 2023]  Daily 85.2kg [16 Oct 2023]  Daily   95.2kg [12 Oct 2023]   Daily 87.7kg [11 Oct 2023]  Daily 87.4kg [09 Oct 2023]  Daily 86.4kg [07 Oct 2023]  Daily 82.5kg [06 Oct 2023]  Daily 82.6kg [4 Oct 2023]   Daily 83.5kg [03 Oct 2023]  Daily 84.5kg [2 Oct 2023]  Daily 87.2kg [1 Oct 2023]  Daily 88.3kg [29 Sep 2023]  Daily 89.9kg [28 Sep 2023]  Daily 87.7kg [24 Sep 2023]  Daily 90.4kg [21 Sep 2023]  Daily 91.4kg [20 Sep 2023]  Daily 86.7kg [18 Sep 2023]  Daily 88.1kg [16 Sep 2023]  Daily 88.9kg [15 Sep 2023]   Daily 89.1 [14 Sep 2023]  Daily 92.9kg [6 Sep 2023]  Daily 91.8kg [27 Aug 2023]  Daily 101kg [9 Aug 2023]       Weight Change: weight trending down throughout admission, now appears to be trending back up. Recommend continue weekly/daily weights for trending & ensuring adequacy of nutrition provision    IBW: 86.4kg  % IBW: 108.0    Estimated energy needs:   Ideal body weight (86.4kg) used for calculations as pt >100% IBW and BMI <30 per [facility] Standards of Care. Needs estimated for age and adjusted for current clinical status, increased needs for post-op & open abd wound healing    Calories: 6656-6052 kcals based on 30-40 kcals/kg  Protein: 129.6-172.8 g protein based on 1.5-2.0g protein/kg + additional depending on outputs  *Fluid needs per team    Subjective:   78 M w/ Crohn's, AFib/Flutter s/p DCCVs on amiodarone, remote ileocectomy and open appendectomy. Admitted (6/23) for SBO vs Crohns flare, s/p NGT decompression and s/p lap TRE converted to open TRE, SBR x 3, left in discontinuity with abthera vac on (6/27), RTOR for ileocolic resection, small bowel anastomosis, and abdominal wall closure on (6/28), c/b fluid collection s/p IR aspiration of perihepatic fluid on (7/3), c/b wound dehiscence s/p RTOR exlap, washout, ileocolic resection with end ileostomy, blow hole colostomy, red rubber from ileostomy to small bowel anastomosis; vicryl bridging mesh on (7/5) transferred to SICU postoperatively for hemodynamic monitoring, with hospital course complicated by periods of AMS most recently on 9/1 and associated with oliguria, severe ELVI and hyponatremia, which resolved but stepped back up for to SICU on 9/10 for acute AMS, intermittent hypoglycemia, AFib with RVR. Percutaneous Cholecystostomy placed on 9/11 for enlarged GB, PCT capped 10/5.    Chart reviewed. Pt seen at bedside on 5 EA with IDT during AM rounds. TPN restarted on TPN 12/4, lipids every MWF. 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 Unable to quantify kcal/protein obtained via PO diet due to inadequate lumen for absorption and high fistula outputs. Recommend to continue parenteral nutrition as primary means to nutrition. Labs reviewed- calcitriol 8.4 <L>, vitamin D 14.1 <L>- on PO supplementation [? absorption], Na 134 <L>, BUN 45 <H> [increasing], Creat 2.36 <H>, direct bilirubin 4.9 <H>, total bilirubin 7.1 <H>,  <H>, ALT 74 <H>, Phos 2.4 <L>- needs repletion. Meds- on zofran prn, synthroid [administer 30-60 minutes before food; separate at least 4hrs from calcium or iron-containing products or bile acid sequestrants], 50,000 units ergocalciferol weekly, EPO, ursodiol, cholestyramine.     Previous Nutrition Diagnosis:  Increased Nutrient Needs R/T physiological demands for nutrient AEB post-op wound healing    Active [ X  ]  Resolved [   ]    If resolved, new PES:     Goal:   Pt will meet at least 75% of protein & energy needs via most appropriate route for nutrition     Recommendations:  1. c/w TPN via PICC as primary means to nutrition - 460g Dex, 100g AA, 50g SMOF lipids [MWF schedule]; provides average of 2178.3 kcals, 100g protein daily, GIR 3.42 mg/kg/min   - recommend cycling TPN over 12-16hrs as feasible  - monitor weights & wound healing, adjust substrates as indicated  - fluid & lytes per team  2. Continue Vit D supplementation  - recommend to recheck levels 12/28  3. PO diet per team discretion  - consider liberalization to regular diet as medically feasible to allow for more menu options  - c/w 1 LPS BID [200 kcals, 30g protein] + 1 Ensure Max daily [150 kcals, 30g protein]  - consider NPO for bowel rest as indicated   - ongoing education prn  5. Hydration per team  - risk for dehydration with high outputs, monitor  - additional fluids per team  6. Monitor renal function  - adjust protein in TPN prn  7. Fluids & lytes per team  - consider oral rehydration solution prn  8. Weekly lipid panel while on TPN  - hold/decrease lipids if TG >400  9. Monitor BMP/Mg/Phos, POC BG while on TPN  - monitor & replenish lytes outside TPN bag prn  10. Continue close monitoring of clinical course & adjust recommendations prn    Education: current nutrition provision    Risk Level: High [ X  ] Moderate [   ] Low [   ]

## 2023-12-12 NOTE — PROGRESS NOTE ADULT - ASSESSMENT
78M w PMH Crohn's, AFib/Flutter s/p DCCVs on amiodarone, remote ileocectomy and open appendectomy. Admitted (6/23) for SBO vs Crohns flare, s/p NGT decompression and s/p lap converted to open TRE, SBR x 3, left in discontinuity with abthera vac on (6/27), RTOR for ileocolic resection, small bowel anastomosis, and abdominal wall closure on (6/28), c/b fluid collection s/p IR aspiration of perihepatic fluid on (7/3), c/b wound dehiscence s/p RTOR exlap, washout, ileocolic resection with end ileostomy, blow hole colostomy, red rubber from ileostomy to small bowel anastomosis; vicryl bridging mesh on (7/5) transferred to SICU postoperatively for hemodynamic monitoring, with hospital course complicated by periods of severe ELVI and hyponatremia, which resolved but stepped back up for to SICU on (9/10) for acute AMS, intermittent hypoglycemia, AFib with RVR. Percutaneous Cholecystostomy placed on (9/11) for enlarged GB, PCT capped 10/5 and uncapped 10/25 for hyperbilirubinemia, Right brachial DVT, left basillic and cephalic superficial thrombus on duplex 11/2, CT 11/14 with ALDEN ground glass opacity of unclear etiology, completed empiric 7day cefepime course 11/22, rising T-bili of unclear etilogy, now w resolved candida glabrata fungemia, ELVI    Trend Cr, f/u Nephrology w/ addl recs   cw diet as tolerated   f/u hepatology recommendations   Wound care as per ostomy nurses   Continue to watch hydration status and repleting/restricting  continue with PT   rest of care per SICU, appreciate excellent care by SICU team      78M w PMH Crohn's, AFib/Flutter s/p DCCVs on amiodarone, remote ileocectomy and open appendectomy. Admitted (6/23) for SBO vs Crohns flare, s/p NGT decompression and s/p lap converted to open TRE, SBR x 3, left in discontinuity with abthera vac on (6/27), RTOR for ileocolic resection, small bowel anastomosis, and abdominal wall closure on (6/28), c/b fluid collection s/p IR aspiration of perihepatic fluid on (7/3), c/b wound dehiscence s/p RTOR exlap, washout, ileocolic resection with end ileostomy, blow hole colostomy, red rubber from ileostomy to small bowel anastomosis; vicryl bridging mesh on (7/5) transferred to SICU postoperatively for hemodynamic monitoring, with hospital course complicated by periods of severe EVLI and hyponatremia, which resolved but stepped back up for to SICU on (9/10) for acute AMS, intermittent hypoglycemia, AFib with RVR. Percutaneous Cholecystostomy placed on (9/11) for enlarged GB, PCT capped 10/5 and uncapped 10/25 for hyperbilirubinemia, Right brachial DVT, left basillic and cephalic superficial thrombus on duplex 11/2, CT 11/14 with ALDEN ground glass opacity of unclear etiology, completed empiric 7day cefepime course 11/22, rising T-bili of unclear etilogy, now w resolved candida glabrata fungemia, ELVI    Trend Cr, f/u Nephrology w/ addl recs   cw diet as tolerated   f/u hepatology recommendations   Wound care as per ostomy nurses   Continue to watch hydration status and repleting/restricting  continue with PT   rest of care per SICU, appreciate excellent care by SICU team

## 2023-12-12 NOTE — PROGRESS NOTE ADULT - ATTENDING COMMENTS
UC, AF s/p SBR, ileocolic resection, ileostomy with enteroatmospheric fistula  physical as above  US with bibasilar atelectasis and small effusions  he also looks dry  will give 500 ml 5% albumin today  reviewed IS and will start NIV treatments 1 hour Q 6h  mobilize  continue TPN  rest as above

## 2023-12-12 NOTE — PROGRESS NOTE ADULT - SUBJECTIVE AND OBJECTIVE BOX
24 hour events: PRN hydralazine given at 4am for high BP, bipap ordered this morning for SOB. Encouraged IS. CXR wnl. Bedside ultrasound showed atelectasis and effusions. Giving 250 albumin now and in afternoon.       SUBJECTIVE: Patient seen and examined at bedside this morning. Complaining of SOB. Denies chest pain, abdominal pain.    heparin   Injectable 5000 Unit(s) SubCutaneous every 8 hours  hydrALAZINE Injectable 10 milliGRAM(s) IV Push every 6 hours PRN  metoprolol tartrate Injectable 5 milliGRAM(s) IV Push every 6 hours    MEDICATIONS  (PRN):  hydrALAZINE Injectable 10 milliGRAM(s) IV Push every 6 hours PRN SBP >170  lidocaine 2% Viscous 10 milliLiter(s) Swish and Spit every 12 hours PRN tongue pain  LORazepam   Injectable 0.5 milliGRAM(s) IV Push at bedtime PRN Anxiety  ondansetron Injectable 4 milliGRAM(s) IV Push every 6 hours PRN Nausea and/or Vomiting      I&O's Detail    11 Dec 2023 07:01  -  12 Dec 2023 07:00  --------------------------------------------------------  IN:    Fat Emulsion (Fish Oil &amp; Plant Based) 20% Infusion: 270.4 mL    Oral Fluid: 480 mL    Sodium Chloride 0.9% Bolus: 1000 mL    TPN (Total Parenteral Nutrition): 1889.4 mL  Total IN: 3639.8 mL    OUT:    Drain (mL): 75 mL    Drain (mL): 1125 mL    Drain (mL): 600 mL    Voided (mL): 2525 mL  Total OUT: 4325 mL    Total NET: -685.2 mL      12 Dec 2023 07:01  -  12 Dec 2023 13:04  --------------------------------------------------------  IN:    IV PiggyBack: 125 mL    Oral Fluid: 300 mL    TPN (Total Parenteral Nutrition): 505.2 mL  Total IN: 930.2 mL    OUT:    Drain (mL): 50 mL    Drain (mL): 80 mL    Voided (mL): 300 mL  Total OUT: 430 mL    Total NET: 500.2 mL          T(C): 36.8 (12-12-23 @ 13:00), Max: 37.8 (12-11-23 @ 15:00)  HR: 90 (12-12-23 @ 12:00) (87 - 92)  BP: 179/77 (12-12-23 @ 12:00) (131/62 - 179/77)  RR: 28 (12-12-23 @ 12:00) (20 - 28)  SpO2: 94% (12-12-23 @ 12:00) (92% - 100%)    GENERAL: NAD, resting comfortably in bed  HEENT: NCAT, MMM. +scleral icterus.   C/V: Normal rate, normal peripheral perfusion. No murmurs.   PULM: Nonlabored breathing, no respiratory distress, CTA.   ABD: Soft, ND, NT, no rebound tenderness, no guarding. PCT draining to bag, brown bile.   EXTREM: WWP, no edema, SCDs in place  NEURO: No focal deficits      LABS:                        8.7    7.51  )-----------( 188      ( 11 Dec 2023 12:17 )             27.7     12-12    134<L>  |  102  |  45<H>  ----------------------------<  131<H>  4.4   |  23  |  2.36<H>    Ca    8.5      12 Dec 2023 05:17  Phos  2.4     12-12  Mg     2.3     12-12    TPro  8.6<H>  /  Alb  2.4<L>  /  TBili  7.1<H>  /  DBili  4.9<H>  /  AST  125<H>  /  ALT  74<H>  /  AlkPhos  136<H>  12-12      Urinalysis Basic - ( 12 Dec 2023 05:17 )    Color: x / Appearance: x / SG: x / pH: x  Gluc: 131 mg/dL / Ketone: x  / Bili: x / Urobili: x   Blood: x / Protein: x / Nitrite: x   Leuk Esterase: x / RBC: x / WBC x   Sq Epi: x / Non Sq Epi: x / Bacteria: x        RADIOLOGY & ADDITIONAL STUDIES:

## 2023-12-12 NOTE — ADVANCED PRACTICE NURSE CONSULT - ASSESSMENT
New high output soft convex [place] 1 piece appliance placed over fistula site. More erythema/denudement noted in wound bed today, according to pt his appliance was changed recently because it was leaking and the hole was cut larger. Effluent thick in old appliance. Stoma powder applied over denudement and sealed in with Cavilon barrier wipes, stoma rings applied around stomatized fistula prior to application of appliance. Red rubber catheter had come out of ileostomy, which is strictured, and there was some difficulty inserting a new one. Dr. Samuel made aware. No output noted in old ostomy appliance. Pt instructed to be aware when fistula appliance is beginning to fill up so that it can be emptied and lessen chance of leakage.  New high output soft convex [place] 1 piece appliance placed over fistula site. More erythema/denudement noted in wound bed today, according to pt his appliance was changed recently because it was leaking and the hole was cut larger. Effluent thick in old appliance. Stoma powder applied over denudement and sealed in with Cavilon barrier wipes, stoma rings applied around stomatized fistula prior to application of appliance. Red rubber catheter had come out of ileostomy, which is strictured, and there was some difficulty inserting a new one. Dr. Samuel made aware. No output noted in old ostomy appliance. Pt instructed to be aware when fistula appliance is beginning to fill up so that it can be emptied and lessen chance of leakage.

## 2023-12-13 NOTE — ADVANCED PRACTICE NURSE CONSULT - ASSESSMENT
Pt seen twice this morning d/t leaking fistula pouch.     Abdomen: red beefy fistulized stoma with denuded skin inferiorly.  Output mix of thick pieces and thin liquid.  RLQ Ileostomy pouch intact with RRC intubating stoma. Small yellow drainage in pouch.     On first visit, denuded skin was crusted than Flextend Skin Barrier applied. Soft convex [place] 1 piece placed. However this leaked within only a few hours.  Second visit: denuded skin was crusted and covered with Adapt Rings and paste. Small [place] pouch was applied. Drainage spout cut off and clamp applied d/t thick output.        Pt seen twice this morning d/t leaking fistula pouch.     Abdomen: red beefy fistulized stoma with denuded skin inferiorly.  Output mix of thick pieces and thin liquid.  RLQ Ileostomy pouch intact with RRC intubating stoma. Small yellow drainage in pouch.     On first visit, denuded skin was crusted than Flextend Skin Barrier applied. Soft convex [place] 1 piece placed. However this leaked within only a few hours.  Second visit: denuded skin was crusted and covered with [employer] Rings and paste. Small [place] pouch was applied. Drainage spout cut off and clamp applied d/t thick output.

## 2023-12-13 NOTE — PROGRESS NOTE ADULT - NS ATTEND AMEND GEN_ALL_CORE FT
AF, UC s/p SBR, ileocolic resection, ileostomy now with enteroatmospheric fistula  physical as above  still breathless with exertion, but better  continue NIV one hour q 6h  continue metoprolol   TPN can go back to daily lipids as LFTs not improving  rest as above

## 2023-12-13 NOTE — PROGRESS NOTE ADULT - SUBJECTIVE AND OBJECTIVE BOX
Interval Events:  Hypertensive overnight Given hydralazine.   Patient seen and examined at bedside.      Allergies    penicillin (Angioedema)    Intolerances        Vital Signs Last 24 Hrs  T(C): 36.5 (13 Dec 2023 06:16), Max: 37 (12 Dec 2023 22:22)  T(F): 97.7 (13 Dec 2023 06:16), Max: 98.6 (12 Dec 2023 22:22)  HR: 92 (13 Dec 2023 07:00) (90 - 107)  BP: 171/80 (13 Dec 2023 07:00) (134/60 - 179/77)  BP(mean): 115 (13 Dec 2023 07:00) (86 - 115)  RR: 23 (13 Dec 2023 07:00) (18 - 41)  SpO2: 96% (13 Dec 2023 07:00) (94% - 100%)    Parameters below as of 13 Dec 2023 07:00  Patient On (Oxygen Delivery Method): room air        12-12 @ 07:01  -  12-13 @ 07:00  --------------------------------------------------------  IN: 3483.3 mL / OUT: 2940 mL / NET: 543.3 mL    12-13 @ 07:01  -  12-13 @ 07:27  --------------------------------------------------------  IN: 98.7 mL / OUT: 0 mL / NET: 98.7 mL      12-12 @ 07:01  -  12-13 @ 07:00  --------------------------------------------------------  IN: 3483.3 mL / OUT: 2940 mL / NET: 543.3 mL    12-13 @ 07:01  -  12-13 @ 07:27  --------------------------------------------------------  IN: 98.7 mL / OUT: 0 mL / NET: 98.7 mL        Physical Exam:     GENERAL: NAD, resting comfortably in bed  NEURO: No focal deficits  HEENT: NCAT, MMM. +scleral icterus.   C/V: Normal rate, normal peripheral perfusion. No murmurs.   PULM: Nonlabored breathing, no respiratory distress, CTA.   ABD: Soft, ND, NT, no rebound tenderness, no guarding. PCT draining to bag, brown bile.   EXTREM: WWP, no edema, SCDs in place  Vasc: + DP b/l   Skin: no rashes noted  MSK: No joint swelling  Psych: No signs of anxiety or depression      LABS:      CBC Full  -  ( 11 Dec 2023 12:17 )  WBC Count : 7.51 K/uL  RBC Count : 2.83 M/uL  Hemoglobin : 8.7 g/dL  Hematocrit : 27.7 %  Platelet Count - Automated : 188 K/uL  Mean Cell Volume : 97.9 fl  Mean Cell Hemoglobin : 30.7 pg  Mean Cell Hemoglobin Concentration : 31.4 gm/dL  Auto Neutrophil # : x  Auto Lymphocyte # : x  Auto Monocyte # : x  Auto Eosinophil # : x  Auto Basophil # : x  Auto Neutrophil % : x  Auto Lymphocyte % : x  Auto Monocyte % : x  Auto Eosinophil % : x  Auto Basophil % : x    12-12    134<L>  |  102  |  45<H>  ----------------------------<  131<H>  4.4   |  23  |  2.36<H>    Ca    8.5      12 Dec 2023 05:17  Phos  2.4     12-12  Mg     2.3     12-12    TPro  8.6<H>  /  Alb  2.4<L>  /  TBili  7.1<H>  /  DBili  4.9<H>  /  AST  125<H>  /  ALT  74<H>  /  AlkPhos  136<H>  12-12          Urinalysis Basic - ( 12 Dec 2023 05:17 )    Color: x / Appearance: x / SG: x / pH: x  Gluc: 131 mg/dL / Ketone: x  / Bili: x / Urobili: x   Blood: x / Protein: x / Nitrite: x   Leuk Esterase: x / RBC: x / WBC x   Sq Epi: x / Non Sq Epi: x / Bacteria: x              RADIOLOGY & ADDITIONAL STUDIES (The following images were personally reviewed):          A/p:78M w PMH Crohn's, AFib/Flutter s/p DCCVs on amiodarone, remote ileocectomy and open appendectomy. Admitted (6/23) for SBO vs Crohns flare, s/p NGT decompression and s/p lap converted to open TRE, SBR x 3, left in discontinuity with abthera vac on (6/27), RTOR for ileocolic resection, small bowel anastomosis, and abdominal wall closure on (6/28), c/b fluid collection s/p IR aspiration of perihepatic fluid on (7/3), c/b wound dehiscence s/p RTOR exlap, washout, ileocolic resection with end ileostomy, blow hole colostomy, red rubber from ileostomy to small bowel anastomosis; vicryl bridging mesh on (7/5) transferred to SICU postoperatively for hemodynamic monitoring, with hospital course complicated by periods of severe ELVI and hyponatremia, which resolved but stepped back up for to SICU on (9/10) for acute AMS, intermittent hypoglycemia, AFib with RVR. Percutaneous Cholecystostomy placed on (9/11) for enlarged GB, PCT capped 10/5 and uncapped 10/25 for hyperbilirubinemia, Right brachial DVT, left basillic and cephalic superficial thrombus on duplex 11/2, CT 11/14 with ALDEN ground glass opacity of unclear etiology, completed empiric 7day cefepime course 11/22, rising T-bili of unclear etilogy, now w resolved candida glabrata fungemia, ELVI    NEURO: AMS resolved, likely septic encephalopathy, EEG neg. Hx of depression - cont Effexor. Nausea: Zofran PRN. Anxiety: Lorazepam PRN.  CV: Hx Afib/flutter: s/p DCCV, Amio stopped due to persistent transaminitis. Continue Metoprolol 5q6 with hold parameters. Hx HLD: Lipitor held given high LFTs. TTE  (7/18) - PASP 64mmHg, EF 65-70%, Hx HTN - stopped PO Norvasc as unlikely absorbing, holding Losartan. BP borderline - continue holding hydralazine.   PULM: CT from 11/14 with ALDEN ground glass opacity of unclear etiology. COVID negative. RVP negative. completed 7d empiric cefepime 11/22 to cover for potential PNA. Dyspnea improved.  GI/FEN: Low res, low fat, high protein diet. Cont Ensure max x1/day. fungemia likely CLABSI. PICC replaced on 12/4 and continue TPN qd, lipids MWF.  High output EC fistula: cont Octreotide & Cholestyramine 10/18. Transaminitis elevated T bili of unclear origin, consulted Team 1. GI deferring decision pending further evaluation by hepatology team. s/p Perc Deb on 9/11; MRCP 10/30 no obstruction, seen by Hepatology started on ursodiol, RUQ US 11/25 CBD 5mm, hepatic vessels patent Repeat. MRCP neg. Monitor drainage from fistula, bolus as needed to keep I&O even. Ensure once daily with LPS. Folate, thiamine.  : Voids. ELVI, Cr rising today. d/c famotidine and half Effecor dose given renal dysfunction. Caspo unlikely etiology of ELVI per ID. s/p brief bicarb gtt.  MARLO Duplex and RP US unremarkable, CK wnl.  f/u renal recs pending glomerulonephritis labs.  ENDO: Hx hypothyroid: IV Synthroid, TSH low on 11/30, decreased synthroid dose, recheck TSH 12/6 wnl.  ID: BCx 11/22 grew candida glabrata, likely CLABSI. ID consulted, s/p Caspo, switched to Fluconazole (12/1-12/9). Repeat surveillance blood cx NGTD. Ophthalmology evaulated - normal ocular exam. Echo no vegetation. PICC replaced on 12/4.  Recent MRSA swab negative, RVP panel negative, COVID negative. Cont Nystatin powder // PREVIOUSLY  caspofungin (11/24-12/1). completed empiric 7days cefepime (11/15-11/22), had C. tertium, Lactobacillis from his IR cx 7/3, and candida albicans, lactobacillus, vanc sensitive E faecium, vanc resistant E gallinarum, vanc resistant E casseliflavus, lactobacillus from his OR cx 7/5. Completed course of abx with Imipenem (9/10-9/15). Imipenem (8/26--) imipenem (6/30-7/12, 7/23-7/30), Dapto (6/30-7/5 and 7/23-7/24). Empiric dapto (8/23-24) and cefepime (8/23-24).   PPX: SCDs, SQH, was on Therapeutic lovenox bid for R brachial vein DVT, but will hold off on hep gtt since repeat US Duplex negative.  HEME: Anemia - continue Epogen weekly. S/p Iron Sucrose 200 qd x 3 days for chronic anemia.  LINES: PIVs, LUE PICC (12/4 - ) // DC: LUE PICC (9/1-11/1 ), RUE PICC: (11/1-11/24)   WOUNDS/DRAINS: Abdominal wound and fistula with wound manager in place dressing change Tuesday and Friday. RLQ Ostomy. IR perc deb tube. // DC: L Clay drain.  PT: Ambulate as tolerated OOB to chair daily.  DISPO: SICU due to complicated hospital course.     Interval Events:  Hypertensive overnight Given hydralazine.   Patient seen and examined at bedside.      Allergies    penicillin (Angioedema)    Intolerances        Vital Signs Last 24 Hrs  T(C): 36.5 (13 Dec 2023 06:16), Max: 37 (12 Dec 2023 22:22)  T(F): 97.7 (13 Dec 2023 06:16), Max: 98.6 (12 Dec 2023 22:22)  HR: 92 (13 Dec 2023 07:00) (90 - 107)  BP: 171/80 (13 Dec 2023 07:00) (134/60 - 179/77)  BP(mean): 115 (13 Dec 2023 07:00) (86 - 115)  RR: 23 (13 Dec 2023 07:00) (18 - 41)  SpO2: 96% (13 Dec 2023 07:00) (94% - 100%)    Parameters below as of 13 Dec 2023 07:00  Patient On (Oxygen Delivery Method): room air        12-12 @ 07:01  -  12-13 @ 07:00  --------------------------------------------------------  IN: 3483.3 mL / OUT: 2940 mL / NET: 543.3 mL    12-13 @ 07:01  -  12-13 @ 07:27  --------------------------------------------------------  IN: 98.7 mL / OUT: 0 mL / NET: 98.7 mL      12-12 @ 07:01  -  12-13 @ 07:00  --------------------------------------------------------  IN: 3483.3 mL / OUT: 2940 mL / NET: 543.3 mL    12-13 @ 07:01  -  12-13 @ 07:27  --------------------------------------------------------  IN: 98.7 mL / OUT: 0 mL / NET: 98.7 mL        Physical Exam:     GENERAL: NAD, resting comfortably in bed  NEURO: No focal deficits  HEENT: NCAT, MMM. +scleral icterus.   C/V: Normal rate, normal peripheral perfusion. No murmurs.   PULM: Nonlabored breathing, no respiratory distress, CTA. Decreased BS in bases with crackles noted R> L  ABD: Soft, ND, NT, no rebound tenderness, no guarding. PCT draining to bag Dustin pouch noted draining yellow liquid with some solid particles   EXTREM: WWP, Trace pitting edema SCDs in place  Vasc: + DP b/l   Skin: no rashes noted  MSK: No joint swelling  Psych: Some signs of anxiety noted about getting out of bed.       LABS:      CBC Full  -  ( 11 Dec 2023 12:17 )  WBC Count : 7.51 K/uL  RBC Count : 2.83 M/uL  Hemoglobin : 8.7 g/dL  Hematocrit : 27.7 %  Platelet Count - Automated : 188 K/uL  Mean Cell Volume : 97.9 fl  Mean Cell Hemoglobin : 30.7 pg  Mean Cell Hemoglobin Concentration : 31.4 gm/dL  Auto Neutrophil # : x  Auto Lymphocyte # : x  Auto Monocyte # : x  Auto Eosinophil # : x  Auto Basophil # : x  Auto Neutrophil % : x  Auto Lymphocyte % : x  Auto Monocyte % : x  Auto Eosinophil % : x  Auto Basophil % : x    12-12    134<L>  |  102  |  45<H>  ----------------------------<  131<H>  4.4   |  23  |  2.36<H>    Ca    8.5      12 Dec 2023 05:17  Phos  2.4     12-12  Mg     2.3     12-12    TPro  8.6<H>  /  Alb  2.4<L>  /  TBili  7.1<H>  /  DBili  4.9<H>  /  AST  125<H>  /  ALT  74<H>  /  AlkPhos  136<H>  12-12          Urinalysis Basic - ( 12 Dec 2023 05:17 )    Color: x / Appearance: x / SG: x / pH: x  Gluc: 131 mg/dL / Ketone: x  / Bili: x / Urobili: x   Blood: x / Protein: x / Nitrite: x   Leuk Esterase: x / RBC: x / WBC x   Sq Epi: x / Non Sq Epi: x / Bacteria: x              RADIOLOGY & ADDITIONAL STUDIES (The following images were personally reviewed):          A/p:78M w PMH Crohn's, AFib/Flutter s/p DCCVs on amiodarone, remote ileocectomy and open appendectomy. Admitted (6/23) for SBO vs Crohns flare, s/p NGT decompression and s/p lap converted to open TRE, SBR x 3, left in discontinuity with abthera vac on (6/27), RTOR for ileocolic resection, small bowel anastomosis, and abdominal wall closure on (6/28), c/b fluid collection s/p IR aspiration of perihepatic fluid on (7/3), c/b wound dehiscence s/p RTOR exlap, washout, ileocolic resection with end ileostomy, blow hole colostomy, red rubber from ileostomy to small bowel anastomosis; vicryl bridging mesh on (7/5) transferred to SICU postoperatively for hemodynamic monitoring, with hospital course complicated by periods of severe ELVI and hyponatremia, which resolved but stepped back up for to SICU on (9/10) for acute AMS, intermittent hypoglycemia, AFib with RVR. Percutaneous Cholecystostomy placed on (9/11) for enlarged GB, PCT capped 10/5 and uncapped 10/25 for hyperbilirubinemia, Right brachial DVT, left basillic and cephalic superficial thrombus on duplex 11/2, CT 11/14 with ALDEN ground glass opacity of unclear etiology, completed empiric 7day cefepime course 11/22, rising T-bili of unclear etilogy, now w resolved candida glabrata fungemia, ELVI    NEURO: AMS resolved, likely septic encephalopathy, EEG neg. Hx of depression - cont Effexor. Nausea: Zofran PRN. Anxiety: Lorazepam PRN.  CV: Hx Afib/flutter: s/p DCCV, Amio stopped due to persistent transaminitis. Continue Metoprolol 5q6 with hold parameters. Hx HLD: Lipitor held given high LFTs. TTE  (7/18) - PASP 64mmHg, EF 65-70%, Hx HTN - stopped PO Norvasc as unlikely absorbing, holding Losartan. BP borderline - continue holding hydralazine.   PULM: + Atelectasis noted: Cont Bipap QHS. Encourage OOB and IS.  CT from 11/14 with ALDEN ground glass opacity of unclear etiology. COVID negative. RVP negative. completed 7d empiric cefepime 11/22 to cover for potential PNA. Dyspnea improved.  GI/FEN: Low res, low fat, high protein diet. Cont Ensure max x1/day. fungemia likely CLABSI. PICC replaced on 12/4 and continue TPN qd, lipids MWF.  High output EC fistula: cont Octreotide & Cholestyramine 10/18. Transaminitis elevated T bili of unclear origin, consulted Team 1. GI deferring decision pending further evaluation by hepatology team. s/p Perc Deb on 9/11; MRCP 10/30 no obstruction, seen by Hepatology started on ursodiol, RUQ US 11/25 CBD 5mm, hepatic vessels patent Repeat. MRCP neg. Monitor drainage from fistula, bolus as needed to keep I&O even. Ensure once daily with LPS. Folate, thiamine.  : Voids. ELVI, Cr rising today. d/c famotidine and half Effexor dose given renal dysfunction. Caspo unlikely etiology of ELVI per ID. s/p brief bicarb gtt.  MARLO Duplex and RP US unremarkable, CK wnl.  f/u renal recs pending glomerulonephritis labs.  ENDO: Hx hypothyroid: IV Synthroid, TSH low on 11/30, decreased synthroid dose, recheck TSH 12/6 wnl.  ID: BCx 11/22 grew candida glabrata, likely CLABSI. ID consulted, s/p Caspo, switched to Fluconazole (12/1-12/9). Repeat surveillance blood cx NGTD. Ophthalmology evaulated - normal ocular exam. Echo no vegetation. PICC replaced on 12/4.  Recent MRSA swab negative, RVP panel negative, COVID negative. Cont Nystatin powder // PREVIOUSLY  caspofungin (11/24-12/1). completed empiric 7days cefepime (11/15-11/22), had C. tertium, Lactobacillis from his IR cx 7/3, and candida albicans, lactobacillus, vanc sensitive E faecium, vanc resistant E gallinarum, vanc resistant E casseliflavus, lactobacillus from his OR cx 7/5. Completed course of abx with Imipenem (9/10-9/15). Imipenem (8/26--) imipenem (6/30-7/12, 7/23-7/30), Dapto (6/30-7/5 and 7/23-7/24). Empiric dapto (8/23-24) and cefepime (8/23-24).   PPX: SCDs, SQH, was on Therapeutic lovenox bid for R brachial vein DVT, but will hold off on hep gtt since repeat US Duplex negative.  HEME: Anemia - continue Epogen weekly. S/p Iron Sucrose 200 qd x 3 days for chronic anemia.  LINES: PIVs, LUE PICC (12/4 - ) // DC: LUE PICC (9/1-11/1 ), RUE PICC: (11/1-11/24)   WOUNDS/DRAINS: Abdominal wound and fistula with wound manager in place dressing change Tuesday and Friday. RLQ Ostomy. IR perc deb tube. // DC: L Clay drain.  PT: Ambulate as tolerated OOB to chair daily.  DISPO: SICU due to complicated hospital course.

## 2023-12-13 NOTE — PROGRESS NOTE ADULT - SUBJECTIVE AND OBJECTIVE BOX
INTERVAL HPI/OVERNIGHT EVENTS: HTN overnight -> hydral. + 600cc.     STATUS POST:    6/27: Laparoscopic lysis of adhesions, converted to open lysis of adhesions, SBR x 3, temporary abdominal closure, 5L cryst, 1L 5% alb, 3 pRBC, 1 FFP, 1 plts  6/28: ex lap, removal of abthera, ileocolic resection, small bowel anastamosis, , 1.6 crystalloid, 250 5%, 175 uop   7/3: IR Gendel drained perihepatic aspiration of serous fluid.   7/5: RTOR exlap, washout, ileocolic resection with end ileostomy, blow hole colostomy, fistula, red rubber from ileostomy to small bowel anastomosis; vicryl bridging mesh; R JOYCE below ileostomy, L JOYCE at small bowel enterotomy repair; 500 LR, 500 5% albumin, 3u PRBC, 2 FFP, 400 UOP,   9/11: s/p perc cony    SUBJECTIVE: Pt seen and examined at bedside this am by surgery team. No acute complaints. Tolerating diet, pain well controlled. Denies f/n/v/cp/sob.    MEDICATIONS  (STANDING):  chlorhexidine 2% Cloths 1 Application(s) Topical <User Schedule>  cholestyramine Powder (Sugar-Free) 4 Gram(s) Oral daily  epoetin matt-epbx (RETACRIT) Injectable 32018 Unit(s) SubCutaneous every 7 days  ergocalciferol 92709 Unit(s) Oral <User Schedule>  glucagon  Injectable 1 milliGRAM(s) IntraMuscular once  heparin   Injectable 5000 Unit(s) SubCutaneous every 8 hours  levothyroxine Injectable 30 MICROGram(s) IV Push at bedtime  metoprolol tartrate Injectable 5 milliGRAM(s) IV Push every 6 hours  Parenteral Nutrition - Adult 1 Each TPN Continuous <Continuous>  ursodiol Suspension 300 milliGRAM(s) Oral every 8 hours  venlafaxine XR. 150 milliGRAM(s) Oral daily    MEDICATIONS  (PRN):  hydrALAZINE Injectable 10 milliGRAM(s) IV Push every 6 hours PRN SBP >170  lidocaine 2% Viscous 10 milliLiter(s) Swish and Spit every 12 hours PRN tongue pain  LORazepam   Injectable 0.5 milliGRAM(s) IV Push at bedtime PRN Anxiety  ondansetron Injectable 4 milliGRAM(s) IV Push every 6 hours PRN Nausea and/or Vomiting    Vital Signs Last 24 Hrs  T(C): 36.5 (13 Dec 2023 06:16), Max: 37 (12 Dec 2023 22:22)  T(F): 97.7 (13 Dec 2023 06:16), Max: 98.6 (12 Dec 2023 22:22)  HR: 92 (13 Dec 2023 07:00) (90 - 107)  BP: 171/80 (13 Dec 2023 07:00) (134/60 - 179/77)  BP(mean): 115 (13 Dec 2023 07:00) (86 - 115)  RR: 23 (13 Dec 2023 07:00) (18 - 41)  SpO2: 96% (13 Dec 2023 07:00) (94% - 100%)    Parameters below as of 13 Dec 2023 07:00  Patient On (Oxygen Delivery Method): room air    PHYSICAL EXAM:    Constitutional: A&Ox3, NAD    Respiratory: non labored breathing, no respiratory distress    Cardiovascular: NSR, RRR    Gastrointestinal: abdomen soft, nd, nt. No R/G. PCT draining to bag, brown bile.     Extremities: WWP, no calf tenderness or edema, SCDs in place      I&O's Detail    12 Dec 2023 07:01  -  13 Dec 2023 07:00  --------------------------------------------------------  IN:    Albumin 5%  - 500 mL: 250 mL    IV PiggyBack: 125 mL    IV PiggyBack: 250 mL    Oral Fluid: 960 mL    TPN (Total Parenteral Nutrition): 1898.3 mL  Total IN: 3483.3 mL    OUT:    Drain (mL): 780 mL    Drain (mL): 935 mL    Voided (mL): 1375 mL  Total OUT: 3090 mL    Total NET: 393.3 mL      13 Dec 2023 07:01  -  13 Dec 2023 07:42  --------------------------------------------------------  IN:    TPN (Total Parenteral Nutrition): 98.7 mL  Total IN: 98.7 mL    OUT:  Total OUT: 0 mL    Total NET: 98.7 mL          LABS:                        8.7    7.51  )-----------( 188      ( 11 Dec 2023 12:17 )             27.7     12-12    134<L>  |  102  |  45<H>  ----------------------------<  131<H>  4.4   |  23  |  2.36<H>    Ca    8.5      12 Dec 2023 05:17  Phos  2.4     12-12  Mg     2.3     12-12    TPro  8.6<H>  /  Alb  2.4<L>  /  TBili  7.1<H>  /  DBili  4.9<H>  /  AST  125<H>  /  ALT  74<H>  /  AlkPhos  136<H>  12-12      Urinalysis Basic - ( 12 Dec 2023 05:17 )    Color: x / Appearance: x / SG: x / pH: x  Gluc: 131 mg/dL / Ketone: x  / Bili: x / Urobili: x   Blood: x / Protein: x / Nitrite: x   Leuk Esterase: x / RBC: x / WBC x   Sq Epi: x / Non Sq Epi: x / Bacteria: x        RADIOLOGY & ADDITIONAL STUDIES: INTERVAL HPI/OVERNIGHT EVENTS: HTN overnight -> hydral. + 600cc.     STATUS POST:    6/27: Laparoscopic lysis of adhesions, converted to open lysis of adhesions, SBR x 3, temporary abdominal closure, 5L cryst, 1L 5% alb, 3 pRBC, 1 FFP, 1 plts  6/28: ex lap, removal of abthera, ileocolic resection, small bowel anastamosis, , 1.6 crystalloid, 250 5%, 175 uop   7/3: IR Gendel drained perihepatic aspiration of serous fluid.   7/5: RTOR exlap, washout, ileocolic resection with end ileostomy, blow hole colostomy, fistula, red rubber from ileostomy to small bowel anastomosis; vicryl bridging mesh; R JOYCE below ileostomy, L JOYCE at small bowel enterotomy repair; 500 LR, 500 5% albumin, 3u PRBC, 2 FFP, 400 UOP,   9/11: s/p perc cony    SUBJECTIVE: Pt seen and examined at bedside this am by surgery team. No acute complaints. Tolerating diet, pain well controlled. Denies f/n/v/cp/sob.    MEDICATIONS  (STANDING):  chlorhexidine 2% Cloths 1 Application(s) Topical <User Schedule>  cholestyramine Powder (Sugar-Free) 4 Gram(s) Oral daily  epoetin matt-epbx (RETACRIT) Injectable 84295 Unit(s) SubCutaneous every 7 days  ergocalciferol 48116 Unit(s) Oral <User Schedule>  glucagon  Injectable 1 milliGRAM(s) IntraMuscular once  heparin   Injectable 5000 Unit(s) SubCutaneous every 8 hours  levothyroxine Injectable 30 MICROGram(s) IV Push at bedtime  metoprolol tartrate Injectable 5 milliGRAM(s) IV Push every 6 hours  Parenteral Nutrition - Adult 1 Each TPN Continuous <Continuous>  ursodiol Suspension 300 milliGRAM(s) Oral every 8 hours  venlafaxine XR. 150 milliGRAM(s) Oral daily    MEDICATIONS  (PRN):  hydrALAZINE Injectable 10 milliGRAM(s) IV Push every 6 hours PRN SBP >170  lidocaine 2% Viscous 10 milliLiter(s) Swish and Spit every 12 hours PRN tongue pain  LORazepam   Injectable 0.5 milliGRAM(s) IV Push at bedtime PRN Anxiety  ondansetron Injectable 4 milliGRAM(s) IV Push every 6 hours PRN Nausea and/or Vomiting    Vital Signs Last 24 Hrs  T(C): 36.5 (13 Dec 2023 06:16), Max: 37 (12 Dec 2023 22:22)  T(F): 97.7 (13 Dec 2023 06:16), Max: 98.6 (12 Dec 2023 22:22)  HR: 92 (13 Dec 2023 07:00) (90 - 107)  BP: 171/80 (13 Dec 2023 07:00) (134/60 - 179/77)  BP(mean): 115 (13 Dec 2023 07:00) (86 - 115)  RR: 23 (13 Dec 2023 07:00) (18 - 41)  SpO2: 96% (13 Dec 2023 07:00) (94% - 100%)    Parameters below as of 13 Dec 2023 07:00  Patient On (Oxygen Delivery Method): room air    PHYSICAL EXAM:    Constitutional: A&Ox3, NAD    Respiratory: non labored breathing, no respiratory distress    Cardiovascular: NSR, RRR    Gastrointestinal: abdomen soft, nd, nt. No R/G. PCT draining to bag, brown bile.     Extremities: WWP, no calf tenderness or edema, SCDs in place      I&O's Detail    12 Dec 2023 07:01  -  13 Dec 2023 07:00  --------------------------------------------------------  IN:    Albumin 5%  - 500 mL: 250 mL    IV PiggyBack: 125 mL    IV PiggyBack: 250 mL    Oral Fluid: 960 mL    TPN (Total Parenteral Nutrition): 1898.3 mL  Total IN: 3483.3 mL    OUT:    Drain (mL): 780 mL    Drain (mL): 935 mL    Voided (mL): 1375 mL  Total OUT: 3090 mL    Total NET: 393.3 mL      13 Dec 2023 07:01  -  13 Dec 2023 07:42  --------------------------------------------------------  IN:    TPN (Total Parenteral Nutrition): 98.7 mL  Total IN: 98.7 mL    OUT:  Total OUT: 0 mL    Total NET: 98.7 mL          LABS:                        8.7    7.51  )-----------( 188      ( 11 Dec 2023 12:17 )             27.7     12-12    134<L>  |  102  |  45<H>  ----------------------------<  131<H>  4.4   |  23  |  2.36<H>    Ca    8.5      12 Dec 2023 05:17  Phos  2.4     12-12  Mg     2.3     12-12    TPro  8.6<H>  /  Alb  2.4<L>  /  TBili  7.1<H>  /  DBili  4.9<H>  /  AST  125<H>  /  ALT  74<H>  /  AlkPhos  136<H>  12-12      Urinalysis Basic - ( 12 Dec 2023 05:17 )    Color: x / Appearance: x / SG: x / pH: x  Gluc: 131 mg/dL / Ketone: x  / Bili: x / Urobili: x   Blood: x / Protein: x / Nitrite: x   Leuk Esterase: x / RBC: x / WBC x   Sq Epi: x / Non Sq Epi: x / Bacteria: x        RADIOLOGY & ADDITIONAL STUDIES:

## 2023-12-13 NOTE — PROGRESS NOTE ADULT - ASSESSMENT
78M w PMH Crohn's, AFib/Flutter s/p DCCVs on amiodarone, remote ileocectomy and open appendectomy. Admitted (6/23) for SBO vs Crohns flare, s/p NGT decompression and s/p lap converted to open TRE, SBR x 3, left in discontinuity with abthera vac on (6/27), RTOR for ileocolic resection, small bowel anastomosis, and abdominal wall closure on (6/28), c/b fluid collection s/p IR aspiration of perihepatic fluid on (7/3), c/b wound dehiscence s/p RTOR exlap, washout, ileocolic resection with end ileostomy, blow hole colostomy, red rubber from ileostomy to small bowel anastomosis; vicryl bridging mesh on (7/5) transferred to SICU postoperatively for hemodynamic monitoring, with hospital course complicated by periods of severe ELVI and hyponatremia, which resolved but stepped back up for to SICU on (9/10) for acute AMS, intermittent hypoglycemia, AFib with RVR. Percutaneous Cholecystostomy placed on (9/11) for enlarged GB, PCT capped 10/5 and uncapped 10/25 for hyperbilirubinemia, Right brachial DVT, left basillic and cephalic superficial thrombus on duplex 11/2, CT 11/14 with ALDEN ground glass opacity of unclear etiology, completed empiric 7day cefepime course 11/22, rising T-bili of unclear etiology now w resolved candida glabrata fungemia, ELVI.     Care per SICU  Trend Cr, f/u Nephrology recs  f/u hepatology recs  Wound care

## 2023-12-14 NOTE — PROGRESS NOTE ADULT - ASSESSMENT
78M w PMH Crohn's, AFib/Flutter s/p DCCVs on amiodarone, remote ileocectomy and open appendectomy. Admitted (6/23) for SBO vs Crohns flare, s/p NGT decompression and s/p lap converted to open TRE, SBR x 3, left in discontinuity with abthera vac on (6/27), RTOR for ileocolic resection, small bowel anastomosis, and abdominal wall closure on (6/28), c/b fluid collection s/p IR aspiration of perihepatic fluid on (7/3), c/b wound dehiscence s/p RTOR exlap, washout, ileocolic resection with end ileostomy, blow hole colostomy, red rubber from ileostomy to small bowel anastomosis; vicryl bridging mesh on (7/5) transferred to SICU postoperatively for hemodynamic monitoring, with hospital course complicated by periods of severe ELVI and hyponatremia, which resolved but stepped back up for to SICU on (9/10) for acute AMS, intermittent hypoglycemia, AFib with RVR. Percutaneous Cholecystostomy placed on (9/11) for enlarged GB, PCT capped 10/5 and uncapped 10/25 for hyperbilirubinemia, Right brachial DVT, left basillic and cephalic superficial thrombus on duplex 11/2, CT 11/14 with ALDEN ground glass opacity of unclear etiology, completed empiric 7day cefepime course 11/22, rising T-bili of unclear etilogy, now w resolved candida glabrata fungemia, ELVI    Trend Cr, f/u Nephrology w/ addl recs   cw diet as tolerated   f/u hepatology recommendations   Wound care as per ostomy nurses   Continue to watch hydration status and repleting/restricting  continue with PT   rest of care per SICU, appreciate excellent care by SICU team

## 2023-12-14 NOTE — PROGRESS NOTE ADULT - NS ATTEND AMEND GEN_ALL_CORE FT
UC, AF, s/p SBR, ileocolic resection, ileostomy with enteroatmospheric fistula  physical as above  continue TPN with full lipid (SMOF) and lower CHO  LFTs stable  renal numbers slightly better  CXR with improved atelectasis LLL, R is about the same  continue NIV 1 hr q 6h with IS and mobilization  continue metoprolol  high losses from fistula and cholecystostomy continue, will keep up with albumin bolusses or crystalloid

## 2023-12-14 NOTE — PROGRESS NOTE ADULT - SUBJECTIVE AND OBJECTIVE BOX
S: No new issues/events overnight, no new med c/o    O: ICU Vital Signs Last 24 Hrs  T(F): 98.5 (12-14-23 @ 09:00), Max: 98.6 (12-13-23 @ 17:43)  HR: 101 (12-14-23 @ 10:00) (92 - 108)  BP: 141/63 (12-14-23 @ 10:00) (119/56 - 178/82)  BP(mean): 90 (12-14-23 @ 10:00) (81 - 118)  ABP: --  RR: 19 (12-14-23 @ 10:00) (18 - 39)  SpO2: 98% (12-14-23 @ 10:00) (93% - 100%)    PHYSICAL EXAM:   Neurological: AAOx3, CNII-XII intact,  strength 5/5 b/l  ENT: mucus membrane moist  Cardiovascular: RRR  Respiratory: CTA  Gastrointestinal: soft, NT, ND, BS+, fistula thickish yellowish output, no blood, perc cony bilious  Extremities: warm, no dependent edema  Vascular: no cyanosis/erythema  Skin: no rashes  MSK: no joint swelling.     LABS:    12-14    133<L>  |  99  |  41<H>  ----------------------------<  128<H>  3.7   |  25  |  1.99<H>    Ca    8.4      14 Dec 2023 05:30  Phos  4.6     12-14  Mg     2.2     12-14    TPro  8.7<H>  /  Alb  2.8<L>  /  TBili  7.4<H>  /  DBili  5.0<H>  /  AST  105<H>  /  ALT  70<H>  /  AlkPhos  132<H>  12-14  LIVER FUNCTIONS - ( 14 Dec 2023 05:30 )  Alb: 2.8 g/dL / Pro: 8.7 g/dL / ALK PHOS: 132 U/L / ALT: 70 U/L / AST: 105 U/L / GGT: x                               8.2    4.99  )-----------( 177      ( 14 Dec 2023 05:30 )             26.3     Urinalysis Basic - ( 14 Dec 2023 05:30 )    Color: x / Appearance: x / SG: x / pH: x  Gluc: 128 mg/dL / Ketone: x  / Bili: x / Urobili: x   Blood: x / Protein: x / Nitrite: x   Leuk Esterase: x / RBC: x / WBC x   Sq Epi: x / Non Sq Epi: x / Bacteria: x    CAPILLARY BLOOD GLUCOSE        MEDICATIONS  (STANDING):  chlorhexidine 2% Cloths 1 Application(s) Topical <User Schedule>  cholestyramine Powder (Sugar-Free) 4 Gram(s) Oral daily  epoetin matt-epbx (RETACRIT) Injectable 94005 Unit(s) SubCutaneous every 7 days  ergocalciferol 36189 Unit(s) Oral <User Schedule>  glucagon  Injectable 1 milliGRAM(s) IntraMuscular once  heparin   Injectable 5000 Unit(s) SubCutaneous every 8 hours  levothyroxine Injectable 30 MICROGram(s) IV Push at bedtime  lipid, fat emulsion (Fish Oil and Plant Based) 20% Infusion 0.54 Gm/kG/Day (20.83 mL/Hr) IV Continuous <Continuous>  lipid, fat emulsion (Fish Oil and Plant Based) 20% Infusion 0.54 Gm/kG/Day (20.83 mL/Hr) IV Continuous <Continuous>  metoprolol tartrate Injectable 5 milliGRAM(s) IV Push every 6 hours  Parenteral Nutrition - Adult 1 Each TPN Continuous <Continuous>  Parenteral Nutrition - Adult 1 Each TPN Continuous <Continuous>  ursodiol Suspension 300 milliGRAM(s) Oral every 8 hours  venlafaxine XR. 150 milliGRAM(s) Oral daily    MEDICATIONS  (PRN):  hydrALAZINE Injectable 10 milliGRAM(s) IV Push every 6 hours PRN SBP >170  lidocaine 2% Viscous 10 milliLiter(s) Swish and Spit every 12 hours PRN tongue pain  LORazepam   Injectable 0.5 milliGRAM(s) IV Push at bedtime PRN Anxiety  ondansetron Injectable 4 milliGRAM(s) IV Push every 6 hours PRN Nausea and/or Vomiting      Poole:	  [ ] None	[ ] Daily Poole Order Placed	   Indication:	  [ ] Strict I and O's    [ ] Obstruction     [ ] Incontinence + Stage 3 or 4 Decubitus  Central Line:  [ ] None	   [ ]  Medication / TPN Administration     [ ] No Peripheral IV        S: No new issues/events overnight, no new med c/o    O: ICU Vital Signs Last 24 Hrs  T(F): 98.5 (12-14-23 @ 09:00), Max: 98.6 (12-13-23 @ 17:43)  HR: 101 (12-14-23 @ 10:00) (92 - 108)  BP: 141/63 (12-14-23 @ 10:00) (119/56 - 178/82)  BP(mean): 90 (12-14-23 @ 10:00) (81 - 118)  ABP: --  RR: 19 (12-14-23 @ 10:00) (18 - 39)  SpO2: 98% (12-14-23 @ 10:00) (93% - 100%)    PHYSICAL EXAM:   Neurological: AAOx3, CNII-XII intact,  strength 5/5 b/l  ENT: mucus membrane moist  Cardiovascular: RRR  Respiratory: CTA  Gastrointestinal: soft, NT, ND, BS+, fistula thickish yellowish output, no blood, perc cony bilious  Extremities: warm, no dependent edema  Vascular: no cyanosis/erythema  Skin: no rashes  MSK: no joint swelling.     LABS:    12-14    133<L>  |  99  |  41<H>  ----------------------------<  128<H>  3.7   |  25  |  1.99<H>    Ca    8.4      14 Dec 2023 05:30  Phos  4.6     12-14  Mg     2.2     12-14    TPro  8.7<H>  /  Alb  2.8<L>  /  TBili  7.4<H>  /  DBili  5.0<H>  /  AST  105<H>  /  ALT  70<H>  /  AlkPhos  132<H>  12-14  LIVER FUNCTIONS - ( 14 Dec 2023 05:30 )  Alb: 2.8 g/dL / Pro: 8.7 g/dL / ALK PHOS: 132 U/L / ALT: 70 U/L / AST: 105 U/L / GGT: x                               8.2    4.99  )-----------( 177      ( 14 Dec 2023 05:30 )             26.3     Urinalysis Basic - ( 14 Dec 2023 05:30 )    Color: x / Appearance: x / SG: x / pH: x  Gluc: 128 mg/dL / Ketone: x  / Bili: x / Urobili: x   Blood: x / Protein: x / Nitrite: x   Leuk Esterase: x / RBC: x / WBC x   Sq Epi: x / Non Sq Epi: x / Bacteria: x    CAPILLARY BLOOD GLUCOSE        MEDICATIONS  (STANDING):  chlorhexidine 2% Cloths 1 Application(s) Topical <User Schedule>  cholestyramine Powder (Sugar-Free) 4 Gram(s) Oral daily  epoetin matt-epbx (RETACRIT) Injectable 26202 Unit(s) SubCutaneous every 7 days  ergocalciferol 99283 Unit(s) Oral <User Schedule>  glucagon  Injectable 1 milliGRAM(s) IntraMuscular once  heparin   Injectable 5000 Unit(s) SubCutaneous every 8 hours  levothyroxine Injectable 30 MICROGram(s) IV Push at bedtime  lipid, fat emulsion (Fish Oil and Plant Based) 20% Infusion 0.54 Gm/kG/Day (20.83 mL/Hr) IV Continuous <Continuous>  lipid, fat emulsion (Fish Oil and Plant Based) 20% Infusion 0.54 Gm/kG/Day (20.83 mL/Hr) IV Continuous <Continuous>  metoprolol tartrate Injectable 5 milliGRAM(s) IV Push every 6 hours  Parenteral Nutrition - Adult 1 Each TPN Continuous <Continuous>  Parenteral Nutrition - Adult 1 Each TPN Continuous <Continuous>  ursodiol Suspension 300 milliGRAM(s) Oral every 8 hours  venlafaxine XR. 150 milliGRAM(s) Oral daily    MEDICATIONS  (PRN):  hydrALAZINE Injectable 10 milliGRAM(s) IV Push every 6 hours PRN SBP >170  lidocaine 2% Viscous 10 milliLiter(s) Swish and Spit every 12 hours PRN tongue pain  LORazepam   Injectable 0.5 milliGRAM(s) IV Push at bedtime PRN Anxiety  ondansetron Injectable 4 milliGRAM(s) IV Push every 6 hours PRN Nausea and/or Vomiting      Poole:	  [ ] None	[ ] Daily Poole Order Placed	   Indication:	  [ ] Strict I and O's    [ ] Obstruction     [ ] Incontinence + Stage 3 or 4 Decubitus  Central Line:  [ ] None	   [ ]  Medication / TPN Administration     [ ] No Peripheral IV        S: No new issues/events overnight, no new med c/o    O: ICU Vital Signs Last 24 Hrs  T(F): 98.5 (12-14-23 @ 09:00), Max: 98.6 (12-13-23 @ 17:43)  HR: 101 (12-14-23 @ 10:00) (92 - 108)  BP: 141/63 (12-14-23 @ 10:00) (119/56 - 178/82)  BP(mean): 90 (12-14-23 @ 10:00) (81 - 118)  ABP: --  RR: 19 (12-14-23 @ 10:00) (18 - 39)  SpO2: 98% (12-14-23 @ 10:00) (93% - 100%)    PHYSICAL EXAM:   Neurological: AAOx3, CNII-XII intact,  strength 5/5 b/l  ENT: mucus membrane moist  Cardiovascular: RRR  Respiratory: CTA  Gastrointestinal: soft, NT, ND, BS+, fistula thickish yellowish output, no blood, perc cony bilious  Extremities: warm, no dependent edema  Vascular: no cyanosis/erythema  Skin: no rashes  MSK: no joint swelling.     LABS:    12-14    133<L>  |  99  |  41<H>  ----------------------------<  128<H>  3.7   |  25  |  1.99<H>    Ca    8.4      14 Dec 2023 05:30  Phos  4.6     12-14  Mg     2.2     12-14    TPro  8.7<H>  /  Alb  2.8<L>  /  TBili  7.4<H>  /  DBili  5.0<H>  /  AST  105<H>  /  ALT  70<H>  /  AlkPhos  132<H>  12-14  LIVER FUNCTIONS - ( 14 Dec 2023 05:30 )  Alb: 2.8 g/dL / Pro: 8.7 g/dL / ALK PHOS: 132 U/L / ALT: 70 U/L / AST: 105 U/L / GGT: x                               8.2    4.99  )-----------( 177      ( 14 Dec 2023 05:30 )             26.3     Urinalysis Basic - ( 14 Dec 2023 05:30 )    Color: x / Appearance: x / SG: x / pH: x  Gluc: 128 mg/dL / Ketone: x  / Bili: x / Urobili: x   Blood: x / Protein: x / Nitrite: x   Leuk Esterase: x / RBC: x / WBC x   Sq Epi: x / Non Sq Epi: x / Bacteria: x    CAPILLARY BLOOD GLUCOSE        MEDICATIONS  (STANDING):  chlorhexidine 2% Cloths 1 Application(s) Topical <User Schedule>  cholestyramine Powder (Sugar-Free) 4 Gram(s) Oral daily  epoetin matt-epbx (RETACRIT) Injectable 12127 Unit(s) SubCutaneous every 7 days  ergocalciferol 10616 Unit(s) Oral <User Schedule>  glucagon  Injectable 1 milliGRAM(s) IntraMuscular once  heparin   Injectable 5000 Unit(s) SubCutaneous every 8 hours  levothyroxine Injectable 30 MICROGram(s) IV Push at bedtime  lipid, fat emulsion (Fish Oil and Plant Based) 20% Infusion 0.54 Gm/kG/Day (20.83 mL/Hr) IV Continuous <Continuous>  lipid, fat emulsion (Fish Oil and Plant Based) 20% Infusion 0.54 Gm/kG/Day (20.83 mL/Hr) IV Continuous <Continuous>  metoprolol tartrate Injectable 5 milliGRAM(s) IV Push every 6 hours  Parenteral Nutrition - Adult 1 Each TPN Continuous <Continuous>  Parenteral Nutrition - Adult 1 Each TPN Continuous <Continuous>  ursodiol Suspension 300 milliGRAM(s) Oral every 8 hours  venlafaxine XR. 150 milliGRAM(s) Oral daily    MEDICATIONS  (PRN):  hydrALAZINE Injectable 10 milliGRAM(s) IV Push every 6 hours PRN SBP >170  lidocaine 2% Viscous 10 milliLiter(s) Swish and Spit every 12 hours PRN tongue pain  LORazepam   Injectable 0.5 milliGRAM(s) IV Push at bedtime PRN Anxiety  ondansetron Injectable 4 milliGRAM(s) IV Push every 6 hours PRN Nausea and/or Vomiting      Poole:	  [ x] None	[ ] Daily Poole Order Placed	   Indication:	  [ ] Strict I and O's    [ ] Obstruction     [ ] Incontinence + Stage 3 or 4 Decubitus  Central Line:  [ ] None	   [ x]  Medication / TPN Administration     [ ] No Peripheral IV        S: No new issues/events overnight, no new med c/o    O: ICU Vital Signs Last 24 Hrs  T(F): 98.5 (12-14-23 @ 09:00), Max: 98.6 (12-13-23 @ 17:43)  HR: 101 (12-14-23 @ 10:00) (92 - 108)  BP: 141/63 (12-14-23 @ 10:00) (119/56 - 178/82)  BP(mean): 90 (12-14-23 @ 10:00) (81 - 118)  ABP: --  RR: 19 (12-14-23 @ 10:00) (18 - 39)  SpO2: 98% (12-14-23 @ 10:00) (93% - 100%)    PHYSICAL EXAM:   Neurological: AAOx3, CNII-XII intact,  strength 5/5 b/l  ENT: mucus membrane moist  Cardiovascular: RRR  Respiratory: CTA  Gastrointestinal: soft, NT, ND, BS+, fistula thickish yellowish output, no blood, perc cony bilious  Extremities: warm, no dependent edema  Vascular: no cyanosis/erythema  Skin: no rashes  MSK: no joint swelling.     LABS:    12-14    133<L>  |  99  |  41<H>  ----------------------------<  128<H>  3.7   |  25  |  1.99<H>    Ca    8.4      14 Dec 2023 05:30  Phos  4.6     12-14  Mg     2.2     12-14    TPro  8.7<H>  /  Alb  2.8<L>  /  TBili  7.4<H>  /  DBili  5.0<H>  /  AST  105<H>  /  ALT  70<H>  /  AlkPhos  132<H>  12-14  LIVER FUNCTIONS - ( 14 Dec 2023 05:30 )  Alb: 2.8 g/dL / Pro: 8.7 g/dL / ALK PHOS: 132 U/L / ALT: 70 U/L / AST: 105 U/L / GGT: x                               8.2    4.99  )-----------( 177      ( 14 Dec 2023 05:30 )             26.3     Urinalysis Basic - ( 14 Dec 2023 05:30 )    Color: x / Appearance: x / SG: x / pH: x  Gluc: 128 mg/dL / Ketone: x  / Bili: x / Urobili: x   Blood: x / Protein: x / Nitrite: x   Leuk Esterase: x / RBC: x / WBC x   Sq Epi: x / Non Sq Epi: x / Bacteria: x    CAPILLARY BLOOD GLUCOSE        MEDICATIONS  (STANDING):  chlorhexidine 2% Cloths 1 Application(s) Topical <User Schedule>  cholestyramine Powder (Sugar-Free) 4 Gram(s) Oral daily  epoetin matt-epbx (RETACRIT) Injectable 12739 Unit(s) SubCutaneous every 7 days  ergocalciferol 22610 Unit(s) Oral <User Schedule>  glucagon  Injectable 1 milliGRAM(s) IntraMuscular once  heparin   Injectable 5000 Unit(s) SubCutaneous every 8 hours  levothyroxine Injectable 30 MICROGram(s) IV Push at bedtime  lipid, fat emulsion (Fish Oil and Plant Based) 20% Infusion 0.54 Gm/kG/Day (20.83 mL/Hr) IV Continuous <Continuous>  lipid, fat emulsion (Fish Oil and Plant Based) 20% Infusion 0.54 Gm/kG/Day (20.83 mL/Hr) IV Continuous <Continuous>  metoprolol tartrate Injectable 5 milliGRAM(s) IV Push every 6 hours  Parenteral Nutrition - Adult 1 Each TPN Continuous <Continuous>  Parenteral Nutrition - Adult 1 Each TPN Continuous <Continuous>  ursodiol Suspension 300 milliGRAM(s) Oral every 8 hours  venlafaxine XR. 150 milliGRAM(s) Oral daily    MEDICATIONS  (PRN):  hydrALAZINE Injectable 10 milliGRAM(s) IV Push every 6 hours PRN SBP >170  lidocaine 2% Viscous 10 milliLiter(s) Swish and Spit every 12 hours PRN tongue pain  LORazepam   Injectable 0.5 milliGRAM(s) IV Push at bedtime PRN Anxiety  ondansetron Injectable 4 milliGRAM(s) IV Push every 6 hours PRN Nausea and/or Vomiting      Poole:	  [ x] None	[ ] Daily Poole Order Placed	   Indication:	  [ ] Strict I and O's    [ ] Obstruction     [ ] Incontinence + Stage 3 or 4 Decubitus  Central Line:  [ ] None	   [ x]  Medication / TPN Administration     [ ] No Peripheral IV        S: No new issues/events overnight, no new med c/o. No SOB or CP    O: ICU Vital Signs Last 24 Hrs  T(F): 98.5 (12-14-23 @ 09:00), Max: 98.6 (12-13-23 @ 17:43)  HR: 101 (12-14-23 @ 10:00) (92 - 108)  BP: 141/63 (12-14-23 @ 10:00) (119/56 - 178/82)  BP(mean): 90 (12-14-23 @ 10:00) (81 - 118)  ABP: --  RR: 19 (12-14-23 @ 10:00) (18 - 39)  SpO2: 98% (12-14-23 @ 10:00) (93% - 100%)    PHYSICAL EXAM:   Neurological: AAOx3, CNII-XII intact,  strength 5/5 b/l  ENT: mucus membrane moist  Cardiovascular: RRR  Respiratory: CTA  Gastrointestinal: soft, NT, ND, BS+, fistula thickish yellowish output, no blood, perc cony bilious  Extremities: warm, no dependent edema  Vascular: no cyanosis/erythema  Skin: no rashes  MSK: no joint swelling.     LABS:    12-14    133<L>  |  99  |  41<H>  ----------------------------<  128<H>  3.7   |  25  |  1.99<H>    Ca    8.4      14 Dec 2023 05:30  Phos  4.6     12-14  Mg     2.2     12-14    TPro  8.7<H>  /  Alb  2.8<L>  /  TBili  7.4<H>  /  DBili  5.0<H>  /  AST  105<H>  /  ALT  70<H>  /  AlkPhos  132<H>  12-14  LIVER FUNCTIONS - ( 14 Dec 2023 05:30 )  Alb: 2.8 g/dL / Pro: 8.7 g/dL / ALK PHOS: 132 U/L / ALT: 70 U/L / AST: 105 U/L / GGT: x                               8.2    4.99  )-----------( 177      ( 14 Dec 2023 05:30 )             26.3     Urinalysis Basic - ( 14 Dec 2023 05:30 )    Color: x / Appearance: x / SG: x / pH: x  Gluc: 128 mg/dL / Ketone: x  / Bili: x / Urobili: x   Blood: x / Protein: x / Nitrite: x   Leuk Esterase: x / RBC: x / WBC x   Sq Epi: x / Non Sq Epi: x / Bacteria: x    CAPILLARY BLOOD GLUCOSE        MEDICATIONS  (STANDING):  chlorhexidine 2% Cloths 1 Application(s) Topical <User Schedule>  cholestyramine Powder (Sugar-Free) 4 Gram(s) Oral daily  epoetin matt-epbx (RETACRIT) Injectable 21203 Unit(s) SubCutaneous every 7 days  ergocalciferol 70136 Unit(s) Oral <User Schedule>  glucagon  Injectable 1 milliGRAM(s) IntraMuscular once  heparin   Injectable 5000 Unit(s) SubCutaneous every 8 hours  levothyroxine Injectable 30 MICROGram(s) IV Push at bedtime  lipid, fat emulsion (Fish Oil and Plant Based) 20% Infusion 0.54 Gm/kG/Day (20.83 mL/Hr) IV Continuous <Continuous>  lipid, fat emulsion (Fish Oil and Plant Based) 20% Infusion 0.54 Gm/kG/Day (20.83 mL/Hr) IV Continuous <Continuous>  metoprolol tartrate Injectable 5 milliGRAM(s) IV Push every 6 hours  Parenteral Nutrition - Adult 1 Each TPN Continuous <Continuous>  Parenteral Nutrition - Adult 1 Each TPN Continuous <Continuous>  ursodiol Suspension 300 milliGRAM(s) Oral every 8 hours  venlafaxine XR. 150 milliGRAM(s) Oral daily    MEDICATIONS  (PRN):  hydrALAZINE Injectable 10 milliGRAM(s) IV Push every 6 hours PRN SBP >170  lidocaine 2% Viscous 10 milliLiter(s) Swish and Spit every 12 hours PRN tongue pain  LORazepam   Injectable 0.5 milliGRAM(s) IV Push at bedtime PRN Anxiety  ondansetron Injectable 4 milliGRAM(s) IV Push every 6 hours PRN Nausea and/or Vomiting      Poole:	  [ x] None	[ ] Daily Poole Order Placed	   Indication:	  [ ] Strict I and O's    [ ] Obstruction     [ ] Incontinence + Stage 3 or 4 Decubitus  Central Line:  [ ] None	   [ x]  Medication / TPN Administration     [ ] No Peripheral IV        S: No new issues/events overnight, no new med c/o. No SOB or CP    O: ICU Vital Signs Last 24 Hrs  T(F): 98.5 (12-14-23 @ 09:00), Max: 98.6 (12-13-23 @ 17:43)  HR: 101 (12-14-23 @ 10:00) (92 - 108)  BP: 141/63 (12-14-23 @ 10:00) (119/56 - 178/82)  BP(mean): 90 (12-14-23 @ 10:00) (81 - 118)  ABP: --  RR: 19 (12-14-23 @ 10:00) (18 - 39)  SpO2: 98% (12-14-23 @ 10:00) (93% - 100%)    PHYSICAL EXAM:   Neurological: AAOx3, CNII-XII intact,  strength 5/5 b/l  ENT: mucus membrane moist  Cardiovascular: RRR  Respiratory: CTA  Gastrointestinal: soft, NT, ND, BS+, fistula thickish yellowish output, no blood, perc cony bilious  Extremities: warm, no dependent edema  Vascular: no cyanosis/erythema  Skin: no rashes  MSK: no joint swelling.     LABS:    12-14    133<L>  |  99  |  41<H>  ----------------------------<  128<H>  3.7   |  25  |  1.99<H>    Ca    8.4      14 Dec 2023 05:30  Phos  4.6     12-14  Mg     2.2     12-14    TPro  8.7<H>  /  Alb  2.8<L>  /  TBili  7.4<H>  /  DBili  5.0<H>  /  AST  105<H>  /  ALT  70<H>  /  AlkPhos  132<H>  12-14  LIVER FUNCTIONS - ( 14 Dec 2023 05:30 )  Alb: 2.8 g/dL / Pro: 8.7 g/dL / ALK PHOS: 132 U/L / ALT: 70 U/L / AST: 105 U/L / GGT: x                               8.2    4.99  )-----------( 177      ( 14 Dec 2023 05:30 )             26.3     Urinalysis Basic - ( 14 Dec 2023 05:30 )    Color: x / Appearance: x / SG: x / pH: x  Gluc: 128 mg/dL / Ketone: x  / Bili: x / Urobili: x   Blood: x / Protein: x / Nitrite: x   Leuk Esterase: x / RBC: x / WBC x   Sq Epi: x / Non Sq Epi: x / Bacteria: x    CAPILLARY BLOOD GLUCOSE        MEDICATIONS  (STANDING):  chlorhexidine 2% Cloths 1 Application(s) Topical <User Schedule>  cholestyramine Powder (Sugar-Free) 4 Gram(s) Oral daily  epoetin matt-epbx (RETACRIT) Injectable 58616 Unit(s) SubCutaneous every 7 days  ergocalciferol 53810 Unit(s) Oral <User Schedule>  glucagon  Injectable 1 milliGRAM(s) IntraMuscular once  heparin   Injectable 5000 Unit(s) SubCutaneous every 8 hours  levothyroxine Injectable 30 MICROGram(s) IV Push at bedtime  lipid, fat emulsion (Fish Oil and Plant Based) 20% Infusion 0.54 Gm/kG/Day (20.83 mL/Hr) IV Continuous <Continuous>  lipid, fat emulsion (Fish Oil and Plant Based) 20% Infusion 0.54 Gm/kG/Day (20.83 mL/Hr) IV Continuous <Continuous>  metoprolol tartrate Injectable 5 milliGRAM(s) IV Push every 6 hours  Parenteral Nutrition - Adult 1 Each TPN Continuous <Continuous>  Parenteral Nutrition - Adult 1 Each TPN Continuous <Continuous>  ursodiol Suspension 300 milliGRAM(s) Oral every 8 hours  venlafaxine XR. 150 milliGRAM(s) Oral daily    MEDICATIONS  (PRN):  hydrALAZINE Injectable 10 milliGRAM(s) IV Push every 6 hours PRN SBP >170  lidocaine 2% Viscous 10 milliLiter(s) Swish and Spit every 12 hours PRN tongue pain  LORazepam   Injectable 0.5 milliGRAM(s) IV Push at bedtime PRN Anxiety  ondansetron Injectable 4 milliGRAM(s) IV Push every 6 hours PRN Nausea and/or Vomiting      Poole:	  [ x] None	[ ] Daily Poole Order Placed	   Indication:	  [ ] Strict I and O's    [ ] Obstruction     [ ] Incontinence + Stage 3 or 4 Decubitus  Central Line:  [ ] None	   [ x]  Medication / TPN Administration     [ ] No Peripheral IV

## 2023-12-14 NOTE — PROGRESS NOTE ADULT - SUBJECTIVE AND OBJECTIVE BOX
SUBJECTIVE:  Seen at bedside, eating yoghurt and fruit meal, denies abdominal pain nausea vomiting, shortness of breathe, chest pain, palpitations     Found to be in Afib overnight, has since resolved   Voiding freely and adequately     MEDICATIONS  (STANDING):  chlorhexidine 2% Cloths 1 Application(s) Topical <User Schedule>  cholestyramine Powder (Sugar-Free) 4 Gram(s) Oral daily  epoetin matt-epbx (RETACRIT) Injectable 86350 Unit(s) SubCutaneous every 7 days  ergocalciferol 33339 Unit(s) Oral <User Schedule>  glucagon  Injectable 1 milliGRAM(s) IntraMuscular once  heparin   Injectable 5000 Unit(s) SubCutaneous every 8 hours  levothyroxine Injectable 30 MICROGram(s) IV Push at bedtime  lipid, fat emulsion (Fish Oil and Plant Based) 20% Infusion 0.54 Gm/kG/Day (20.83 mL/Hr) IV Continuous <Continuous>  metoprolol tartrate Injectable 5 milliGRAM(s) IV Push every 6 hours  Parenteral Nutrition - Adult 1 Each TPN Continuous <Continuous>  Parenteral Nutrition - Adult 1 Each TPN Continuous <Continuous>  ursodiol Suspension 300 milliGRAM(s) Oral every 8 hours  venlafaxine XR. 150 milliGRAM(s) Oral daily    MEDICATIONS  (PRN):  hydrALAZINE Injectable 10 milliGRAM(s) IV Push every 6 hours PRN SBP >170  lidocaine 2% Viscous 10 milliLiter(s) Swish and Spit every 12 hours PRN tongue pain  LORazepam   Injectable 0.5 milliGRAM(s) IV Push at bedtime PRN Anxiety  ondansetron Injectable 4 milliGRAM(s) IV Push every 6 hours PRN Nausea and/or Vomiting      Vital Signs Last 24 Hrs  T(C): 36.9 (14 Dec 2023 09:00), Max: 36.9 (14 Dec 2023 09:00)  T(F): 98.5 (14 Dec 2023 09:00), Max: 98.5 (14 Dec 2023 09:00)  HR: 96 (14 Dec 2023 18:00) (92 - 108)  BP: 137/63 (14 Dec 2023 18:00) (118/57 - 178/82)  BP(mean): 91 (14 Dec 2023 18:00) (81 - 118)  RR: 20 (14 Dec 2023 18:00) (18 - 48)  SpO2: 97% (14 Dec 2023 18:00) (93% - 100%)    Parameters below as of 14 Dec 2023 18:00  Patient On (Oxygen Delivery Method): room air    GENERAL: NAD, resting comfortably in bed  HEENT: NCAT, MMM. +scleral icterus.   C/V: Normal rate, normal peripheral perfusion. No murmurs.   PULM: Nonlabored breathing, no respiratory distress, CTA.   ABD: Soft, ND, NT, no rebound tenderness, no guarding. PCT draining to bag, brown bile.   EXTREM: WWP, no edema, SCDs in place  NEURO: No focal deficits  I&O's Summary    I&O's Summary    13 Dec 2023 07:01  -  14 Dec 2023 07:00  --------------------------------------------------------  IN: 4060.3 mL / OUT: 3845 mL / NET: 215.3 mL    14 Dec 2023 07:01  -  14 Dec 2023 18:47  --------------------------------------------------------  IN: 1669.4 mL / OUT: 1510 mL / NET: 159.4 mL        LABS:                        8.2    4.99  )-----------( 177      ( 14 Dec 2023 05:30 )             26.3     12-14    133<L>  |  99  |  41<H>  ----------------------------<  128<H>  3.7   |  25  |  1.99<H>    Ca    8.4      14 Dec 2023 05:30  Phos  4.6     12-14  Mg     2.2     12-14    TPro  8.7<H>  /  Alb  2.8<L>  /  TBili  7.4<H>  /  DBili  5.0<H>  /  AST  105<H>  /  ALT  70<H>  /  AlkPhos  132<H>  12-14      Urinalysis Basic - ( 14 Dec 2023 05:30 )    Color: x / Appearance: x / SG: x / pH: x  Gluc: 128 mg/dL / Ketone: x  / Bili: x / Urobili: x   Blood: x / Protein: x / Nitrite: x   Leuk Esterase: x / RBC: x / WBC x   Sq Epi: x / Non Sq Epi: x / Bacteria: x      CAPILLARY BLOOD GLUCOSE        LIVER FUNCTIONS - ( 14 Dec 2023 05:30 )  Alb: 2.8 g/dL / Pro: 8.7 g/dL / ALK PHOS: 132 U/L / ALT: 70 U/L / AST: 105 U/L / GGT: x             RADIOLOGY & ADDITIONAL STUDIES:   SUBJECTIVE:  Seen at bedside, eating yoghurt and fruit meal, denies abdominal pain nausea vomiting, shortness of breathe, chest pain, palpitations     Found to be in Afib overnight, has since resolved   Voiding freely and adequately     MEDICATIONS  (STANDING):  chlorhexidine 2% Cloths 1 Application(s) Topical <User Schedule>  cholestyramine Powder (Sugar-Free) 4 Gram(s) Oral daily  epoetin matt-epbx (RETACRIT) Injectable 78096 Unit(s) SubCutaneous every 7 days  ergocalciferol 04782 Unit(s) Oral <User Schedule>  glucagon  Injectable 1 milliGRAM(s) IntraMuscular once  heparin   Injectable 5000 Unit(s) SubCutaneous every 8 hours  levothyroxine Injectable 30 MICROGram(s) IV Push at bedtime  lipid, fat emulsion (Fish Oil and Plant Based) 20% Infusion 0.54 Gm/kG/Day (20.83 mL/Hr) IV Continuous <Continuous>  metoprolol tartrate Injectable 5 milliGRAM(s) IV Push every 6 hours  Parenteral Nutrition - Adult 1 Each TPN Continuous <Continuous>  Parenteral Nutrition - Adult 1 Each TPN Continuous <Continuous>  ursodiol Suspension 300 milliGRAM(s) Oral every 8 hours  venlafaxine XR. 150 milliGRAM(s) Oral daily    MEDICATIONS  (PRN):  hydrALAZINE Injectable 10 milliGRAM(s) IV Push every 6 hours PRN SBP >170  lidocaine 2% Viscous 10 milliLiter(s) Swish and Spit every 12 hours PRN tongue pain  LORazepam   Injectable 0.5 milliGRAM(s) IV Push at bedtime PRN Anxiety  ondansetron Injectable 4 milliGRAM(s) IV Push every 6 hours PRN Nausea and/or Vomiting      Vital Signs Last 24 Hrs  T(C): 36.9 (14 Dec 2023 09:00), Max: 36.9 (14 Dec 2023 09:00)  T(F): 98.5 (14 Dec 2023 09:00), Max: 98.5 (14 Dec 2023 09:00)  HR: 96 (14 Dec 2023 18:00) (92 - 108)  BP: 137/63 (14 Dec 2023 18:00) (118/57 - 178/82)  BP(mean): 91 (14 Dec 2023 18:00) (81 - 118)  RR: 20 (14 Dec 2023 18:00) (18 - 48)  SpO2: 97% (14 Dec 2023 18:00) (93% - 100%)    Parameters below as of 14 Dec 2023 18:00  Patient On (Oxygen Delivery Method): room air    GENERAL: NAD, resting comfortably in bed  HEENT: NCAT, MMM. +scleral icterus.   C/V: Normal rate, normal peripheral perfusion. No murmurs.   PULM: Nonlabored breathing, no respiratory distress, CTA.   ABD: Soft, ND, NT, no rebound tenderness, no guarding. PCT draining to bag, brown bile.   EXTREM: WWP, no edema, SCDs in place  NEURO: No focal deficits  I&O's Summary    I&O's Summary    13 Dec 2023 07:01  -  14 Dec 2023 07:00  --------------------------------------------------------  IN: 4060.3 mL / OUT: 3845 mL / NET: 215.3 mL    14 Dec 2023 07:01  -  14 Dec 2023 18:47  --------------------------------------------------------  IN: 1669.4 mL / OUT: 1510 mL / NET: 159.4 mL        LABS:                        8.2    4.99  )-----------( 177      ( 14 Dec 2023 05:30 )             26.3     12-14    133<L>  |  99  |  41<H>  ----------------------------<  128<H>  3.7   |  25  |  1.99<H>    Ca    8.4      14 Dec 2023 05:30  Phos  4.6     12-14  Mg     2.2     12-14    TPro  8.7<H>  /  Alb  2.8<L>  /  TBili  7.4<H>  /  DBili  5.0<H>  /  AST  105<H>  /  ALT  70<H>  /  AlkPhos  132<H>  12-14      Urinalysis Basic - ( 14 Dec 2023 05:30 )    Color: x / Appearance: x / SG: x / pH: x  Gluc: 128 mg/dL / Ketone: x  / Bili: x / Urobili: x   Blood: x / Protein: x / Nitrite: x   Leuk Esterase: x / RBC: x / WBC x   Sq Epi: x / Non Sq Epi: x / Bacteria: x      CAPILLARY BLOOD GLUCOSE        LIVER FUNCTIONS - ( 14 Dec 2023 05:30 )  Alb: 2.8 g/dL / Pro: 8.7 g/dL / ALK PHOS: 132 U/L / ALT: 70 U/L / AST: 105 U/L / GGT: x             RADIOLOGY & ADDITIONAL STUDIES:

## 2023-12-14 NOTE — CHART NOTE - NSCHARTNOTEFT_GEN_A_CORE
Admitting Diagnosis:   Patient is a 78y old  Male who presents with a chief complaint of SBO (13 Dec 2023 07:27)      PAST MEDICAL & SURGICAL HISTORY:  Essential hypertension  Hypertension      Atrial fibrillation  s/p cardioversion 2010 and 2014  Pt. reports 4 DCCV  Now on Amiodarone 200mg PO bid and Eliquis 5mg PO bid  Last DCCV 4 yrs ago at [facility]      Crohn's disease  s/p partial resection of ileum      Hyperlipidemia      Hypothyroidism      History of depression  On Venlafaxine ER 150mg PO bid      Junctional rhythm      H/O knee surgery      History of cataract surgery          Current Nutrition Order: TPN via PICC- 1.7L bag running over 14hrs (121ml/hr), providing 380g Dex, 100g AA, 50g SMOF lipids daily; provides 2192 kcals, 100g protein daily, GIR 4.86 mg/kg/min   PO- Low fiber, Low fat diet + 2 LPS per day [provide 100 kcals, 15g protein each], + Ensure Max daily [150 kcals, 20g protein]    PO Intake: Good (%) [   ]  Fair (50-75%) [ x  ] Poor (<25%) [   ]    GI Issues:   12/14: ileostomy output 15cc x 24hrs, abdominal wound/fistula output  1230cc x 24hrs, per cony output 1010cc x 24hrs      12/12: ileostomy output 75cc x 24hrs, abdominal wound/fistula output  600cc x 24hrs, per cony output 1125cc x 24hrs      12/5: ileostomy output 35cc x 24hrs, abdominal wound/fistula output  250cc x 24hrs, per cony output 745cc x 24hrs      12/1: ileostomy output 30cc x 24hrs, abdominal wound/fistula output  2400cc x 24hrs, per cony output 550cc x 24hrs     11/27: ileostomy output 50 cc x 24hrs, abdominal wound/fistula output 2175 cc x 24hrs, per cony output 530 cc x 24hrs     11/22: ileostomy output 25cc x 24hrs, abdominal wound/fistula output 2350 cc x 24hrs, per cony output 775cc x 24hrs     11/20: ileostomy output 55cc x 24hrs, abdominal wound/fistula output 2025 cc x 24hrs, per cony output 450cc x 24hrs     11/15:  ileostomy output 65cc x 24hrs, abdominal wound/fistula output 875cc x 24hrs, per cony output 775cc x 24hrs    Pain: no pain/discomfort noted    Skin Integrity:  jaundice, abd wound and fistula with wound manager in place, RLQ ileostomy, perc cony drain, lower back/healed wound, Rudy score 17      Labs:   12-14    133<L>  |  99  |  41<H>  ----------------------------<  128<H>  3.7   |  25  |  1.99<H>    Ca    8.4      14 Dec 2023 05:30  Phos  4.6     12-14  Mg     2.2     12-14    TPro  8.7<H>  /  Alb  2.8<L>  /  TBili  7.4<H>  /  DBili  5.0<H>  /  AST  105<H>  /  ALT  70<H>  /  AlkPhos  132<H>  12-14    CAPILLARY BLOOD GLUCOSE          Medications:  MEDICATIONS  (STANDING):  chlorhexidine 2% Cloths 1 Application(s) Topical <User Schedule>  cholestyramine Powder (Sugar-Free) 4 Gram(s) Oral daily  epoetin matt-epbx (RETACRIT) Injectable 99585 Unit(s) SubCutaneous every 7 days  ergocalciferol 12942 Unit(s) Oral <User Schedule>  glucagon  Injectable 1 milliGRAM(s) IntraMuscular once  heparin   Injectable 5000 Unit(s) SubCutaneous every 8 hours  levothyroxine Injectable 30 MICROGram(s) IV Push at bedtime  lipid, fat emulsion (Fish Oil and Plant Based) 20% Infusion 0.54 Gm/kG/Day (20.83 mL/Hr) IV Continuous <Continuous>  metoprolol tartrate Injectable 5 milliGRAM(s) IV Push every 6 hours  Parenteral Nutrition - Adult 1 Each TPN Continuous <Continuous>  Parenteral Nutrition - Adult 1 Each TPN Continuous <Continuous>  ursodiol Suspension 300 milliGRAM(s) Oral every 8 hours  venlafaxine XR. 150 milliGRAM(s) Oral daily    MEDICATIONS  (PRN):  hydrALAZINE Injectable 10 milliGRAM(s) IV Push every 6 hours PRN SBP >170  lidocaine 2% Viscous 10 milliLiter(s) Swish and Spit every 12 hours PRN tongue pain  LORazepam   Injectable 0.5 milliGRAM(s) IV Push at bedtime PRN Anxiety  ondansetron Injectable 4 milliGRAM(s) IV Push every 6 hours PRN Nausea and/or Vomiting      Daily 93.3kg [11 Dec 2023]  Daily 93.3kg [08 Dec 2023]  Daily 93.3kg [6 Dec 2023]  Daily 94.2kg [28 Nov 2023]  Daily 94.4kg [27 Nov 2023]  Daily 94.2kg [26 Nov 2023]  Daily 94.2kg [22 Nov 2023]  Daily 94.2kg [16 Nov 2023]  Daily 94.2 [15 Nov 2023]  Daily 94.2kg [13 Nov 2023]  Daily 95.8kg [09 Nov 2023]  Daily 94.2kg [07 Nov 2023]  Daily 85.2kg [03 Nov 2023]  Daily 85.2kg [31 Oct 2023]  Daily 85.2kg [24 Oct 2023]  Daily 85.2kg [20 Oct 2023]  Daily 81.9kg [18 Oct 2023]  Daily 85.2kg [16 Oct 2023]  Daily   95.2kg [12 Oct 2023]   Daily 87.7kg [11 Oct 2023]  Daily 87.4kg [09 Oct 2023]  Daily 86.4kg [07 Oct 2023]  Daily 82.5kg [06 Oct 2023]  Daily 82.6kg [4 Oct 2023]   Daily 83.5kg [03 Oct 2023]  Daily 84.5kg [2 Oct 2023]  Daily 87.2kg [1 Oct 2023]  Daily 88.3kg [29 Sep 2023]  Daily 89.9kg [28 Sep 2023]  Daily 87.7kg [24 Sep 2023]  Daily 90.4kg [21 Sep 2023]  Daily 91.4kg [20 Sep 2023]  Daily 86.7kg [18 Sep 2023]  Daily 88.1kg [16 Sep 2023]  Daily 88.9kg [15 Sep 2023]   Daily 89.1 [14 Sep 2023]  Daily 92.9kg [6 Sep 2023]  Daily 91.8kg [27 Aug 2023]  Daily 101kg [9 Aug 2023]       Weight Change: weight trending down throughout admission, now appears to be trending back up. Recommend continue weekly/daily weights for trending & ensuring adequacy of nutrition provision    IBW: 86.4kg  % IBW: 108.0    Estimated energy needs:   Ideal body weight (86.4kg) used for calculations as pt >100% IBW and BMI <30 per [facility] Standards of Care. Needs estimated for age and adjusted for current clinical status, increased needs for post-op & open abd wound healing    Calories: 4355-3318 kcals based on 30-40 kcals/kg  Protein: 129.6-172.8 g protein based on 1.5-2.0g protein/kg + additional depending on outputs  *Fluid needs per team    Subjective:   78 M w/ Crohn's, AFib/Flutter s/p DCCVs on amiodarone, remote ileocectomy and open appendectomy. Admitted (6/23) for SBO vs Crohns flare, s/p NGT decompression and s/p lap TRE converted to open TRE, SBR x 3, left in discontinuity with abthera vac on (6/27), RTOR for ileocolic resection, small bowel anastomosis, and abdominal wall closure on (6/28), c/b fluid collection s/p IR aspiration of perihepatic fluid on (7/3), c/b wound dehiscence s/p RTOR exlap, washout, ileocolic resection with end ileostomy, blow hole colostomy, red rubber from ileostomy to small bowel anastomosis; vicryl bridging mesh on (7/5) transferred to SICU postoperatively for hemodynamic monitoring, with hospital course complicated by periods of AMS most recently on 9/1 and associated with oliguria, severe ELVI and hyponatremia, which resolved but stepped back up for to SICU on 9/10 for acute AMS, intermittent hypoglycemia, AFib with RVR. Percutaneous Cholecystostomy placed on 9/11 for enlarged GB, PCT capped 10/5.    Chart reviewed. Pt seen at bedside on 5 EA with IDT during AM rounds. 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 Unable to quantify kcal/protein obtained via PO diet due to inadequate lumen for absorption and high fistula outputs. Recommend to continue parenteral nutrition as primary means to nutrition. LFTs remain elevated- plan to cycle TPN over 14hrs & decrease carbohydrate provision, provide lipids daily to prevent underfeeding. Labs: Na 133 <L>, BUN 41 <H>, Creat 1.99 <H>, Phos 4.6 <H>, total bilirubin 7.4 <H>, direct bilirubin 5.0 <H>,  <H>, ALT 70 <H>, . Meds- on zofran prn. synthroid [administer 30-60 minutes before food; separate at least 4hrs from calcium or iron-containing products or bile acid sequestrants], vit D 50,000 units weekly, EPO, ursodiol, cholestyramine. RDN will continue to monitor, reassess, and intervene as appropriate.     Previous Nutrition Diagnosis:  Increased Nutrient Needs R/T physiological demands for nutrient AEB post-op wound healing    Active [ x  ]  Resolved [   ]    If resolved, new PES:     Goal: Pt will meet at least 75% of protein & energy needs via most appropriate route for nutrition     Recommendations:  1. c/w TPN via PICC as primary means to nutrition - 1.7L bag running over 14hrs (121ml/hr), providing 380g Dex, 100g AA, 50g SMOF lipids daily; provides 2192 kcals, 100g protein daily, GIR 4.86 mg/kg/min [25.4 kcals/kg, 20.7 non-protein kcals/kg, 1.16g protein/kg]  - monitor weights & wound healing, adjust substrates as indicated  - fluid & lytes per team  2. PO diet per team discretion- ***RECOMMEND DIET LIBERALIZATION***  - Recommend Regular diet as medically feasible to allow for more menu options  - c/w 1 LPS BID [200 kcals, 30g protein] + 1 Ensure Max daily [150 kcals, 30g protein] as amenable  - consider NPO for bowel rest as indicated   - ongoing education prn  3. Hydration per team  - risk for dehydration with high outputs, monitor  - additional fluids per team  4. Continue Vit D supplementation  - *** RECOMMEND RECHECK VIT D LEVEL 12/28***  5. Monitor renal function  - adjust protein in TPN prn  6. Fluids & lytes per team  - consider oral rehydration solution prn  7. Weekly lipid panel while on TPN  - hold/decrease lipids if TG >400  - continue to trend LFTs  8. Monitor BMP/Mg/Phos, POC BG while on TPN  - monitor & replenish lytes outside TPN bag prn  9. Continue close monitoring of clinical course & adjust recommendations prn    Education: deferred    Risk Level: High [  x ] Moderate [   ] Low [   ] Admitting Diagnosis:   Patient is a 78y old  Male who presents with a chief complaint of SBO (13 Dec 2023 07:27)      PAST MEDICAL & SURGICAL HISTORY:  Essential hypertension  Hypertension      Atrial fibrillation  s/p cardioversion 2010 and 2014  Pt. reports 4 DCCV  Now on Amiodarone 200mg PO bid and Eliquis 5mg PO bid  Last DCCV 4 yrs ago at [facility]      Crohn's disease  s/p partial resection of ileum      Hyperlipidemia      Hypothyroidism      History of depression  On Venlafaxine ER 150mg PO bid      Junctional rhythm      H/O knee surgery      History of cataract surgery          Current Nutrition Order: TPN via PICC- 1.7L bag running over 14hrs (121ml/hr), providing 380g Dex, 100g AA, 50g SMOF lipids daily; provides 2192 kcals, 100g protein daily, GIR 4.86 mg/kg/min   PO- Low fiber, Low fat diet + 2 LPS per day [provide 100 kcals, 15g protein each], + Ensure Max daily [150 kcals, 20g protein]    PO Intake: Good (%) [   ]  Fair (50-75%) [ x  ] Poor (<25%) [   ]    GI Issues:   12/14: ileostomy output 15cc x 24hrs, abdominal wound/fistula output  1230cc x 24hrs, per cony output 1010cc x 24hrs      12/12: ileostomy output 75cc x 24hrs, abdominal wound/fistula output  600cc x 24hrs, per cony output 1125cc x 24hrs      12/5: ileostomy output 35cc x 24hrs, abdominal wound/fistula output  250cc x 24hrs, per cony output 745cc x 24hrs      12/1: ileostomy output 30cc x 24hrs, abdominal wound/fistula output  2400cc x 24hrs, per cony output 550cc x 24hrs     11/27: ileostomy output 50 cc x 24hrs, abdominal wound/fistula output 2175 cc x 24hrs, per cony output 530 cc x 24hrs     11/22: ileostomy output 25cc x 24hrs, abdominal wound/fistula output 2350 cc x 24hrs, per cony output 775cc x 24hrs     11/20: ileostomy output 55cc x 24hrs, abdominal wound/fistula output 2025 cc x 24hrs, per cony output 450cc x 24hrs     11/15:  ileostomy output 65cc x 24hrs, abdominal wound/fistula output 875cc x 24hrs, per cony output 775cc x 24hrs    Pain: no pain/discomfort noted    Skin Integrity:  jaundice, abd wound and fistula with wound manager in place, RLQ ileostomy, perc cony drain, lower back/healed wound, Rudy score 17      Labs:   12-14    133<L>  |  99  |  41<H>  ----------------------------<  128<H>  3.7   |  25  |  1.99<H>    Ca    8.4      14 Dec 2023 05:30  Phos  4.6     12-14  Mg     2.2     12-14    TPro  8.7<H>  /  Alb  2.8<L>  /  TBili  7.4<H>  /  DBili  5.0<H>  /  AST  105<H>  /  ALT  70<H>  /  AlkPhos  132<H>  12-14    CAPILLARY BLOOD GLUCOSE          Medications:  MEDICATIONS  (STANDING):  chlorhexidine 2% Cloths 1 Application(s) Topical <User Schedule>  cholestyramine Powder (Sugar-Free) 4 Gram(s) Oral daily  epoetin matt-epbx (RETACRIT) Injectable 33455 Unit(s) SubCutaneous every 7 days  ergocalciferol 74022 Unit(s) Oral <User Schedule>  glucagon  Injectable 1 milliGRAM(s) IntraMuscular once  heparin   Injectable 5000 Unit(s) SubCutaneous every 8 hours  levothyroxine Injectable 30 MICROGram(s) IV Push at bedtime  lipid, fat emulsion (Fish Oil and Plant Based) 20% Infusion 0.54 Gm/kG/Day (20.83 mL/Hr) IV Continuous <Continuous>  metoprolol tartrate Injectable 5 milliGRAM(s) IV Push every 6 hours  Parenteral Nutrition - Adult 1 Each TPN Continuous <Continuous>  Parenteral Nutrition - Adult 1 Each TPN Continuous <Continuous>  ursodiol Suspension 300 milliGRAM(s) Oral every 8 hours  venlafaxine XR. 150 milliGRAM(s) Oral daily    MEDICATIONS  (PRN):  hydrALAZINE Injectable 10 milliGRAM(s) IV Push every 6 hours PRN SBP >170  lidocaine 2% Viscous 10 milliLiter(s) Swish and Spit every 12 hours PRN tongue pain  LORazepam   Injectable 0.5 milliGRAM(s) IV Push at bedtime PRN Anxiety  ondansetron Injectable 4 milliGRAM(s) IV Push every 6 hours PRN Nausea and/or Vomiting      Daily 93.3kg [11 Dec 2023]  Daily 93.3kg [08 Dec 2023]  Daily 93.3kg [6 Dec 2023]  Daily 94.2kg [28 Nov 2023]  Daily 94.4kg [27 Nov 2023]  Daily 94.2kg [26 Nov 2023]  Daily 94.2kg [22 Nov 2023]  Daily 94.2kg [16 Nov 2023]  Daily 94.2 [15 Nov 2023]  Daily 94.2kg [13 Nov 2023]  Daily 95.8kg [09 Nov 2023]  Daily 94.2kg [07 Nov 2023]  Daily 85.2kg [03 Nov 2023]  Daily 85.2kg [31 Oct 2023]  Daily 85.2kg [24 Oct 2023]  Daily 85.2kg [20 Oct 2023]  Daily 81.9kg [18 Oct 2023]  Daily 85.2kg [16 Oct 2023]  Daily   95.2kg [12 Oct 2023]   Daily 87.7kg [11 Oct 2023]  Daily 87.4kg [09 Oct 2023]  Daily 86.4kg [07 Oct 2023]  Daily 82.5kg [06 Oct 2023]  Daily 82.6kg [4 Oct 2023]   Daily 83.5kg [03 Oct 2023]  Daily 84.5kg [2 Oct 2023]  Daily 87.2kg [1 Oct 2023]  Daily 88.3kg [29 Sep 2023]  Daily 89.9kg [28 Sep 2023]  Daily 87.7kg [24 Sep 2023]  Daily 90.4kg [21 Sep 2023]  Daily 91.4kg [20 Sep 2023]  Daily 86.7kg [18 Sep 2023]  Daily 88.1kg [16 Sep 2023]  Daily 88.9kg [15 Sep 2023]   Daily 89.1 [14 Sep 2023]  Daily 92.9kg [6 Sep 2023]  Daily 91.8kg [27 Aug 2023]  Daily 101kg [9 Aug 2023]       Weight Change: weight trending down throughout admission, now appears to be trending back up. Recommend continue weekly/daily weights for trending & ensuring adequacy of nutrition provision    IBW: 86.4kg  % IBW: 108.0    Estimated energy needs:   Ideal body weight (86.4kg) used for calculations as pt >100% IBW and BMI <30 per [facility] Standards of Care. Needs estimated for age and adjusted for current clinical status, increased needs for post-op & open abd wound healing    Calories: 0342-8846 kcals based on 30-40 kcals/kg  Protein: 129.6-172.8 g protein based on 1.5-2.0g protein/kg + additional depending on outputs  *Fluid needs per team    Subjective:   78 M w/ Crohn's, AFib/Flutter s/p DCCVs on amiodarone, remote ileocectomy and open appendectomy. Admitted (6/23) for SBO vs Crohns flare, s/p NGT decompression and s/p lap TRE converted to open TRE, SBR x 3, left in discontinuity with abthera vac on (6/27), RTOR for ileocolic resection, small bowel anastomosis, and abdominal wall closure on (6/28), c/b fluid collection s/p IR aspiration of perihepatic fluid on (7/3), c/b wound dehiscence s/p RTOR exlap, washout, ileocolic resection with end ileostomy, blow hole colostomy, red rubber from ileostomy to small bowel anastomosis; vicryl bridging mesh on (7/5) transferred to SICU postoperatively for hemodynamic monitoring, with hospital course complicated by periods of AMS most recently on 9/1 and associated with oliguria, severe ELVI and hyponatremia, which resolved but stepped back up for to SICU on 9/10 for acute AMS, intermittent hypoglycemia, AFib with RVR. Percutaneous Cholecystostomy placed on 9/11 for enlarged GB, PCT capped 10/5.    Chart reviewed. Pt seen at bedside on 5 EA with IDT during AM rounds. 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 Unable to quantify kcal/protein obtained via PO diet due to inadequate lumen for absorption and high fistula outputs. Recommend to continue parenteral nutrition as primary means to nutrition. LFTs remain elevated- plan to cycle TPN over 14hrs & decrease carbohydrate provision, provide lipids daily to prevent underfeeding. Labs: Na 133 <L>, BUN 41 <H>, Creat 1.99 <H>, Phos 4.6 <H>, total bilirubin 7.4 <H>, direct bilirubin 5.0 <H>,  <H>, ALT 70 <H>, . Meds- on zofran prn. synthroid [administer 30-60 minutes before food; separate at least 4hrs from calcium or iron-containing products or bile acid sequestrants], vit D 50,000 units weekly, EPO, ursodiol, cholestyramine. RDN will continue to monitor, reassess, and intervene as appropriate.     Previous Nutrition Diagnosis:  Increased Nutrient Needs R/T physiological demands for nutrient AEB post-op wound healing    Active [ x  ]  Resolved [   ]    If resolved, new PES:     Goal: Pt will meet at least 75% of protein & energy needs via most appropriate route for nutrition     Recommendations:  1. c/w TPN via PICC as primary means to nutrition - 1.7L bag running over 14hrs (121ml/hr), providing 380g Dex, 100g AA, 50g SMOF lipids daily; provides 2192 kcals, 100g protein daily, GIR 4.86 mg/kg/min [25.4 kcals/kg, 20.7 non-protein kcals/kg, 1.16g protein/kg]  - monitor weights & wound healing, adjust substrates as indicated  - fluid & lytes per team  2. PO diet per team discretion- ***RECOMMEND DIET LIBERALIZATION***  - Recommend Regular diet as medically feasible to allow for more menu options  - c/w 1 LPS BID [200 kcals, 30g protein] + 1 Ensure Max daily [150 kcals, 30g protein] as amenable  - consider NPO for bowel rest as indicated   - ongoing education prn  3. Hydration per team  - risk for dehydration with high outputs, monitor  - additional fluids per team  4. Continue Vit D supplementation  - *** RECOMMEND RECHECK VIT D LEVEL 12/28***  5. Monitor renal function  - adjust protein in TPN prn  6. Fluids & lytes per team  - consider oral rehydration solution prn  7. Weekly lipid panel while on TPN  - hold/decrease lipids if TG >400  - continue to trend LFTs  8. Monitor BMP/Mg/Phos, POC BG while on TPN  - monitor & replenish lytes outside TPN bag prn  9. Continue close monitoring of clinical course & adjust recommendations prn    Education: deferred    Risk Level: High [  x ] Moderate [   ] Low [   ]

## 2023-12-14 NOTE — PROVIDER CONTACT NOTE (OTHER) - SITUATION
JOYCE is off LIS despite orders
PT has drainage coming from around Dustin and Wound Vac drains
PT is showing signs of AMS. He is able to answer all questions but the conversation may sway to an unrelated topic or comment. requesting assessment by the team, labs, and a UA to r/o infection.
Temp 101, rigors, AF up to 140s on monitor
Patient complains of "not feeling too good" and SOB
Pt has not voided since 1910 last night.
Pt is disoriented & trying to get out of bed
Eakins pouch output brad/red colored, recent output at 20:00 yellowish at baseline
Patient confused & claiming to hear things coming from soap box dispenser. Additionally has made comments that do not make sense, i.e. "giving malaria medication to a sung"
Patient states he does not remember parts of last night and does not remember being washed up within the last hour
Pt sustaining HR of 130 AFib Rhythm on monitor.
wound vac leaking, dressing soiled/ wound is tearing open
Dustin's pouch leaking from top
Pt HR go up to 150, rapid A-fib, Pt asymptomatic, no S/S of SOB, MD Yoel Calix at the bedside, EKG ordered, other interventions, will continue monitor.
Team notified patient dislodged red rubber catheter from Dustin pouching system.
Wound Vac giving a blockage alert.
no output from illeostomy
BP elevated above parameter systolic 140
BP is elevated above parameters of systolic 140
BP outside parameters
Noticed the wound vac leaking on the lower part of the dressing after the patient went back to bed from the chair. No leakage warning in the actual machine. It is suctioning the drainage
Patient anxious, shaking, hypertensive, tachycardic to 120s (AF)
Patient c/o anxiety/waking up in 'panic.' Tachypneic 22-24, c/o SOB, audible wheeze. SpO2 in mid 90s.
Patient c/o feeling like he could not take a deep breath d/t the conrado's pouch, feels SOB at times, requested 2L NC which was applied
Pt has a midline incision with a wound vac. Alarm is indicating a leak.
RN will stop administering PO contrast due to patient coughing with PO intake
luer lock on PICC disconnected from patient and patient bleed small amount onto bed.  PICC was clamped and cleaned with cleaned with chlorhexidine and new luer lock applied.
BP above parameter to 200/80
Elavated SBP
PCA and Spouse advised RN the patient seemed confused when he was speaking with them. Pt has had AMS in the past which lead to an upgrade to ICU. Requesting team to assess pt at bedside.
Patient is disoriented & having hallucinations
Patient wound vac says drainage is blocked.
Persistent air leak alarming from midline wound vac following physical therapy
Red rubber in the fistula disconnected from patient.
SBP in the 200's.
pt bp for 4am was in 200s; MD aware and put order for 10 hydralazine IV Push.

## 2023-12-14 NOTE — STUDENT SIGN OFF DOCUMENT - DOCUMENTS STUDENTS ARE SIGNED OFF ON
Vital Signs/Plan of Care/Assessment and Intervention
Plan of Care/Assessment and Intervention
Vital Signs/Plan of Care/Assessment and Intervention
Vital Signs/Plan of Care/Assessment and Intervention

## 2023-12-14 NOTE — PROVIDER CONTACT NOTE (OTHER) - ACTION/TREATMENT ORDERED:
Azul RUIZ at bedside assessing patient, labetalol 10mg ordered but held due to repeat /71, will continue to monitor
Hydralazine 10mg IVP administered, SICU aware
Cooling blanket applied, IV Tylenol given, returned to NSR, will continue to monitor
JOSEPH Aguiar made aware of change of rhythm. No interventions ordered at this time.
Monitor. Waiting to hear from the surgery team
No interventions ordered at this time
Resident/team will come to patient's room and repair device.
Will continue to monitor
MD Yogi Diallo advised he will place an order for labs.
MD Yogi Diallo advised he would come to bedside to asses the patient. continue to monitor.
Same was done, team determined wound vac is still holding suction and functioning despite alert. Alert cleared , will continue to monitor.
hold PO intake, okay to send patient for CT scan at 2:30 PM
12 LEad EKG. Continue to monitor.
PA aware that patient has not voided / does not feel the urge to void. No interventions ordered at this time. Plan of care ongoing
RN will continue to monitor
Team advised they will come to see the patient
12 Lead ECG. No other interventions at present per PA.
PICC was clamped and cleaned with cleaned with chlorhexidine and new luer lock applied to PICC.  patient and bed changed.
patient assessed at bedside by DONALD France pending
5mg Lopressor IVP x2 administered, 1g Tylenol given, 0.25mg Ativan given for anxiety, Vancomycin and 1000ml LR bolus administered, ice packs given and patient placed on cooling blanket
Assessed by Dr. Calle, nebulizer treatment to be administered
Contacted provider Mikel who assessed pt at bedside. Will continue to monitor.
JOYCE placed on self suction, SICU made aware
No interventions ordered at this time.
Pt is now AOx4, aware that he thought he was talking to someone. pt states that "this happens at home sometimes, my wife is used to it." Pt reminded to use call bell & not get up alone.
TERRANCE Chapman, mentioned team is aware and will come replace dressing, no intervention at this time as per Terrance Chapman
Hydralazine ordered as per MD Orta.
No interventions ordered at this time
No interventions ordered at this time.
Repositioned pt. No interventions at present per PA. Will continue to monitor.
SICU made aware, no interventions at this time
SICU made aware, no interventions at this time
Will order hydralazine 10mg IVP, same was given with small effect, another 10mg IVP hydralazine ordered with minimal effect, and then labetolol 10mg ordered, and BP responded, came down to 145/65.
hydralazine 10mg iv push ordered, as per JOSEPH Chapman, BP remain same after recheck, labetalol IVP ordered as per JOSEPH Chapman, BP improved to 153/ 68
monitor conrado pouch for sanguineous drainage
Wound care to see patient to change pouch

## 2023-12-14 NOTE — PROGRESS NOTE ADULT - SUBJECTIVE AND OBJECTIVE BOX
SUBJECTIVE: Pt seen and examined at bedside this am by surgery team. No acute complaints. Denies f/n/v/cp/sob.    MEDICATIONS  (STANDING):  chlorhexidine 2% Cloths 1 Application(s) Topical <User Schedule>  cholestyramine Powder (Sugar-Free) 4 Gram(s) Oral daily  epoetin matt-epbx (RETACRIT) Injectable 57425 Unit(s) SubCutaneous every 7 days  ergocalciferol 27479 Unit(s) Oral <User Schedule>  glucagon  Injectable 1 milliGRAM(s) IntraMuscular once  heparin   Injectable 5000 Unit(s) SubCutaneous every 8 hours  levothyroxine Injectable 30 MICROGram(s) IV Push at bedtime  lipid, fat emulsion (Fish Oil and Plant Based) 20% Infusion 0.54 Gm/kG/Day (20.83 mL/Hr) IV Continuous <Continuous>  metoprolol tartrate Injectable 5 milliGRAM(s) IV Push every 6 hours  Parenteral Nutrition - Adult 1 Each TPN Continuous <Continuous>  potassium chloride  10 mEq/100 mL IVPB 10 milliEquivalent(s) IV Intermittent once  ursodiol Suspension 300 milliGRAM(s) Oral every 8 hours  venlafaxine XR. 150 milliGRAM(s) Oral daily    MEDICATIONS  (PRN):  hydrALAZINE Injectable 10 milliGRAM(s) IV Push every 6 hours PRN SBP >170  lidocaine 2% Viscous 10 milliLiter(s) Swish and Spit every 12 hours PRN tongue pain  LORazepam   Injectable 0.5 milliGRAM(s) IV Push at bedtime PRN Anxiety  ondansetron Injectable 4 milliGRAM(s) IV Push every 6 hours PRN Nausea and/or Vomiting      Vital Signs Last 24 Hrs  T(C): 37 (13 Dec 2023 17:43), Max: 37 (13 Dec 2023 17:43)  T(F): 98.6 (13 Dec 2023 17:43), Max: 98.6 (13 Dec 2023 17:43)  HR: 100 (14 Dec 2023 08:00) (92 - 108)  BP: 157/60 (14 Dec 2023 08:00) (119/56 - 178/82)  BP(mean): 98 (14 Dec 2023 08:00) (81 - 118)  RR: 28 (14 Dec 2023 08:00) (18 - 39)  SpO2: 96% (14 Dec 2023 08:00) (93% - 100%)    Parameters below as of 14 Dec 2023 08:00  Patient On (Oxygen Delivery Method): room air    PHYSICAL EXAM:    Constitutional: A&Ox3, NAD    Respiratory: non labored breathing, no respiratory distress    Cardiovascular: NSR, RRR    Gastrointestinal: abdomen soft, nd, nt. No R/G. PCT draining to bag, brown bile.     Extremities: WWP, no calf tenderness or edema, SCDs in place    I&O's Detail    13 Dec 2023 07:01  -  14 Dec 2023 07:00  --------------------------------------------------------  IN:    Albumin 5%  - 500 mL: 250 mL    Fat Emulsion (Fish Oil &amp; Plant Based) 20% Infusion: 228.8 mL    Oral Fluid: 1391 mL    TPN (Total Parenteral Nutrition): 2190.5 mL  Total IN: 4060.3 mL    OUT:    Drain (mL): 1010 mL    Drain (mL): 15 mL    Drain (mL): 1230 mL    Voided (mL): 1590 mL  Total OUT: 3845 mL    Total NET: 215.3 mL      14 Dec 2023 07:01  -  14 Dec 2023 09:01  --------------------------------------------------------  IN:    TPN (Total Parenteral Nutrition): 121 mL  Total IN: 121 mL    OUT:    Fat Emulsion (Fish Oil &amp; Plant Based) 20% Infusion: 0 mL  Total OUT: 0 mL    Total NET: 121 mL          LABS:                        8.2    4.99  )-----------( 177      ( 14 Dec 2023 05:30 )             26.3     12-14    133<L>  |  99  |  41<H>  ----------------------------<  128<H>  3.7   |  25  |  1.99<H>    Ca    8.4      14 Dec 2023 05:30  Phos  4.6     12-14  Mg     2.2     12-14        Urinalysis Basic - ( 14 Dec 2023 05:30 )    Color: x / Appearance: x / SG: x / pH: x  Gluc: 128 mg/dL / Ketone: x  / Bili: x / Urobili: x   Blood: x / Protein: x / Nitrite: x   Leuk Esterase: x / RBC: x / WBC x   Sq Epi: x / Non Sq Epi: x / Bacteria: x        RADIOLOGY & ADDITIONAL STUDIES: SUBJECTIVE: Pt seen and examined at bedside this am by surgery team. No acute complaints. Denies f/n/v/cp/sob.    MEDICATIONS  (STANDING):  chlorhexidine 2% Cloths 1 Application(s) Topical <User Schedule>  cholestyramine Powder (Sugar-Free) 4 Gram(s) Oral daily  epoetin matt-epbx (RETACRIT) Injectable 47929 Unit(s) SubCutaneous every 7 days  ergocalciferol 45405 Unit(s) Oral <User Schedule>  glucagon  Injectable 1 milliGRAM(s) IntraMuscular once  heparin   Injectable 5000 Unit(s) SubCutaneous every 8 hours  levothyroxine Injectable 30 MICROGram(s) IV Push at bedtime  lipid, fat emulsion (Fish Oil and Plant Based) 20% Infusion 0.54 Gm/kG/Day (20.83 mL/Hr) IV Continuous <Continuous>  metoprolol tartrate Injectable 5 milliGRAM(s) IV Push every 6 hours  Parenteral Nutrition - Adult 1 Each TPN Continuous <Continuous>  potassium chloride  10 mEq/100 mL IVPB 10 milliEquivalent(s) IV Intermittent once  ursodiol Suspension 300 milliGRAM(s) Oral every 8 hours  venlafaxine XR. 150 milliGRAM(s) Oral daily    MEDICATIONS  (PRN):  hydrALAZINE Injectable 10 milliGRAM(s) IV Push every 6 hours PRN SBP >170  lidocaine 2% Viscous 10 milliLiter(s) Swish and Spit every 12 hours PRN tongue pain  LORazepam   Injectable 0.5 milliGRAM(s) IV Push at bedtime PRN Anxiety  ondansetron Injectable 4 milliGRAM(s) IV Push every 6 hours PRN Nausea and/or Vomiting      Vital Signs Last 24 Hrs  T(C): 37 (13 Dec 2023 17:43), Max: 37 (13 Dec 2023 17:43)  T(F): 98.6 (13 Dec 2023 17:43), Max: 98.6 (13 Dec 2023 17:43)  HR: 100 (14 Dec 2023 08:00) (92 - 108)  BP: 157/60 (14 Dec 2023 08:00) (119/56 - 178/82)  BP(mean): 98 (14 Dec 2023 08:00) (81 - 118)  RR: 28 (14 Dec 2023 08:00) (18 - 39)  SpO2: 96% (14 Dec 2023 08:00) (93% - 100%)    Parameters below as of 14 Dec 2023 08:00  Patient On (Oxygen Delivery Method): room air    PHYSICAL EXAM:    Constitutional: A&Ox3, NAD    Respiratory: non labored breathing, no respiratory distress    Cardiovascular: NSR, RRR    Gastrointestinal: abdomen soft, nd, nt. No R/G. PCT draining to bag, brown bile.     Extremities: WWP, no calf tenderness or edema, SCDs in place    I&O's Detail    13 Dec 2023 07:01  -  14 Dec 2023 07:00  --------------------------------------------------------  IN:    Albumin 5%  - 500 mL: 250 mL    Fat Emulsion (Fish Oil &amp; Plant Based) 20% Infusion: 228.8 mL    Oral Fluid: 1391 mL    TPN (Total Parenteral Nutrition): 2190.5 mL  Total IN: 4060.3 mL    OUT:    Drain (mL): 1010 mL    Drain (mL): 15 mL    Drain (mL): 1230 mL    Voided (mL): 1590 mL  Total OUT: 3845 mL    Total NET: 215.3 mL      14 Dec 2023 07:01  -  14 Dec 2023 09:01  --------------------------------------------------------  IN:    TPN (Total Parenteral Nutrition): 121 mL  Total IN: 121 mL    OUT:    Fat Emulsion (Fish Oil &amp; Plant Based) 20% Infusion: 0 mL  Total OUT: 0 mL    Total NET: 121 mL          LABS:                        8.2    4.99  )-----------( 177      ( 14 Dec 2023 05:30 )             26.3     12-14    133<L>  |  99  |  41<H>  ----------------------------<  128<H>  3.7   |  25  |  1.99<H>    Ca    8.4      14 Dec 2023 05:30  Phos  4.6     12-14  Mg     2.2     12-14        Urinalysis Basic - ( 14 Dec 2023 05:30 )    Color: x / Appearance: x / SG: x / pH: x  Gluc: 128 mg/dL / Ketone: x  / Bili: x / Urobili: x   Blood: x / Protein: x / Nitrite: x   Leuk Esterase: x / RBC: x / WBC x   Sq Epi: x / Non Sq Epi: x / Bacteria: x        RADIOLOGY & ADDITIONAL STUDIES:

## 2023-12-14 NOTE — PROVIDER CONTACT NOTE (OTHER) - NAME OF MD/NP/PA/DO NOTIFIED:
Dr. Chadwick
JOSEPH Chadwick
JOSEPH Lee
Jamel RUIZ
MD August Orta
SICU Resident
Shereen Del Cid
Yoel Calix
Dr. Calle
JOSEPH Chapman
MD Miah Langford
RUPA Campbell
Yolanda
Dannielle Campbell NP
Dr. Perez
Dr. Salinas
Dr. Salinas
River Joshi MD
Azul RUIZ
Dr. Salinas
Heladio Morin MD
JOSEPH Chapman
JOSEPH Chapman
JOSEPH Marr
MD Yogi Diallo
CORTEZ Del Cid Team o/n at 0621 and Jamel at 2422
Jackeline RUIZ
Justa RUIZ
MD Heladio Morin
MD Yevgeniy Langford
Team 1
Dannielle Campbell NP
JOSEPH Chapman
Justa RUIZ
MD Cruz
MD You Koch
MD Yogi Diallo
JOSEPH Engle/Dr Herrera
JOSEPH Liang - SICU Team

## 2023-12-14 NOTE — PROGRESS NOTE ADULT - ASSESSMENT
A/p:78M w PMH Crohn's, AFib/Flutter s/p DCCVs on amiodarone, remote ileocectomy and open appendectomy. Admitted (6/23) for SBO vs Crohns flare, s/p NGT decompression and s/p lap converted to open TRE, SBR x 3, left in discontinuity with abthera vac on (6/27), RTOR for ileocolic resection, small bowel anastomosis, and abdominal wall closure on (6/28), c/b fluid collection s/p IR aspiration of perihepatic fluid on (7/3), c/b wound dehiscence s/p RTOR exlap, washout, ileocolic resection with end ileostomy, blow hole colostomy, red rubber from ileostomy to small bowel anastomosis; vicryl bridging mesh on (7/5) transferred to SICU postoperatively for hemodynamic monitoring, with hospital course complicated by periods of severe ELVI and hyponatremia, which resolved but stepped back up for to SICU on (9/10) for acute AMS, intermittent hypoglycemia, AFib with RVR. Percutaneous Cholecystostomy placed on (9/11) for enlarged GB, PCT capped 10/5 and uncapped 10/25 for hyperbilirubinemia, Right brachial DVT, left basillic and cephalic superficial thrombus on duplex 11/2, CT 11/14 with ALDEN ground glass opacity of unclear etiology, completed empiric 7day cefepime course 11/22, rising T-bili of unclear etilogy, now w resolved candida glabrata fungemia, ELVI    NEURO: AMS resolved, likely septic encephalopathy, EEG neg. Hx of depression - cont Effexor. Nausea: Zofran PRN. Anxiety: Lorazepam PRN.  CV: Hx Afib/flutter: s/p DCCV, Amio stopped due to persistent transaminitis. Continue Metoprolol 5q6 with hold parameters. Hx HLD: Lipitor held given high LFTs. TTE  (7/18) - PASP 64mmHg, EF 65-70%, Hx HTN - stopped PO Norvasc as unlikely absorbing, holding Losartan. BP borderline - continue hydralazine PRN.   PULM: + Atelectasis noted: Cont Bipap QHS. Encourage OOB and IS.  CT from 11/14 with ALDEN ground glass opacity of unclear etiology. COVID negative. RVP negative. completed 7d empiric cefepime 11/22 to cover for potential PNA. Dyspnea improved.  GI/FEN: Low res, low fat, high protein diet. Cont Ensure max x1/day. Folate, thiamine. fungemia likely CLABSI. PICC replaced on 12/4 and continue TPN qd, lipids qd.  High output EC fistula: cont Octreotide & Cholestyramine 10/18. Transaminitis elevated T bili of unclear origin, s/p Perc Deb on 9/11; MRCP 10/30 no obstruction, seen by Hepatology started on ursodiol, RUQ US 11/25 CBD 5mm, hepatic vessels patent Repeat. MRCP neg. Monitor drainage from fistula, bolus as needed to keep I&O even.    : Voids. ELVI, Cr trending down. stopped famotidine and half Effexor dose given renal dysfunction. Caspo unlikely etiology of ELVI per ID. s/p brief bicarb gtt.  MARLO Duplex and RP US unremarkable, CK wnl.  f/u renal recs pending glomerulonephritis labs.  ENDO: Hx hypothyroid: IV Synthroid, TSH low on 11/30, decreased synthroid dose, repeat TSH 12/6 wnl.  ID: BCx 11/22 grew candida glabrata, likely CLABSI. ID consulted, s/p Caspo, switched to Fluconazole (12/1-12/9). Repeat surveillance blood cx NGTD. Ophthalmology evaulated - normal ocular exam. Echo no vegetation. PICC replaced on 12/4.  Recent MRSA swab negative, RVP panel negative, COVID negative. Cont Nystatin powder // PREVIOUSLY  caspofungin (11/24-12/1). completed empiric 7days cefepime (11/15-11/22), had C. tertium, Lactobacillis from his IR cx 7/3, and candida albicans, lactobacillus, vanc sensitive E faecium, vanc resistant E gallinarum, vanc resistant E casseliflavus, lactobacillus from his OR cx 7/5. Completed course of abx with Imipenem (9/10-9/15). Imipenem (8/26--) imipenem (6/30-7/12, 7/23-7/30), Dapto (6/30-7/5 and 7/23-7/24). Empiric dapto (8/23-24) and cefepime (8/23-24).   PPX: SCDs, SQH, was on Therapeutic lovenox bid for R brachial vein DVT, but will hold off on hep gtt since repeat US Duplex negative.  HEME: Anemia - continue Epogen weekly. S/p Iron Sucrose 200 qd x 3 days for chronic anemia.  LINES: PIVs, LUE PICC (12/4 - ) // DC: LUE PICC (9/1-11/1 ), RUE PICC: (11/1-11/24)   WOUNDS/DRAINS: Abdominal wound and fistula with wound manager in place dressing change Tuesday and Friday. RLQ Ostomy. IR perc deb tube. // DC: L Clay drain.  PT: Ambulate as tolerated OOB to chair daily.  DISPO: SICU due to complicated hospital course.

## 2023-12-15 NOTE — PROGRESS NOTE ADULT - SUBJECTIVE AND OBJECTIVE BOX
SUBJECTIVE: Pt seen and examined at bedside this am by surgery team. Sitting upright in chair, no acute complaints, feels well today. Tolerating diet, pain well controlled. Denies f/n/v/cp/sob.    MEDICATIONS  (STANDING):  chlorhexidine 2% Cloths 1 Application(s) Topical <User Schedule>  cholestyramine Powder (Sugar-Free) 4 Gram(s) Oral daily  epoetin matt-epbx (RETACRIT) Injectable 03668 Unit(s) SubCutaneous every 7 days  ergocalciferol 31503 Unit(s) Oral <User Schedule>  glucagon  Injectable 1 milliGRAM(s) IntraMuscular once  heparin   Injectable 5000 Unit(s) SubCutaneous every 8 hours  levothyroxine Injectable 30 MICROGram(s) IV Push at bedtime  lipid, fat emulsion (Fish Oil and Plant Based) 20% Infusion 0.54 Gm/kG/Day (20.83 mL/Hr) IV Continuous <Continuous>  metoprolol tartrate Injectable 5 milliGRAM(s) IV Push every 6 hours  Parenteral Nutrition - Adult 1 Each TPN Continuous <Continuous>  ursodiol Suspension 300 milliGRAM(s) Oral every 8 hours  venlafaxine XR. 150 milliGRAM(s) Oral daily    MEDICATIONS  (PRN):  hydrALAZINE Injectable 10 milliGRAM(s) IV Push every 6 hours PRN SBP >170  lidocaine 2% Viscous 10 milliLiter(s) Swish and Spit every 12 hours PRN tongue pain  LORazepam   Injectable 0.5 milliGRAM(s) IV Push at bedtime PRN Anxiety  ondansetron Injectable 4 milliGRAM(s) IV Push every 6 hours PRN Nausea and/or Vomiting    Vital Signs Last 24 Hrs  T(C): 36.4 (15 Dec 2023 06:12), Max: 36.9 (14 Dec 2023 09:00)  T(F): 97.5 (15 Dec 2023 06:12), Max: 98.5 (14 Dec 2023 09:00)  HR: 99 (15 Dec 2023 07:00) (93 - 107)  BP: 148/68 (15 Dec 2023 07:00) (118/57 - 166/70)  BP(mean): 78 (15 Dec 2023 07:00) (78 - 103)  RR: 27 (15 Dec 2023 07:00) (17 - 48)  SpO2: 100% (15 Dec 2023 07:00) (93% - 100%)    Parameters below as of 15 Dec 2023 07:00  Patient On (Oxygen Delivery Method): room air    PHYSICAL EXAM:    Constitutional: A&Ox3, NAD    Respiratory: non labored breathing, no respiratory distress    Cardiovascular: NSR, RRR    Gastrointestinal: abdomen soft, nd, nt. No R/G. perc cony bilious    Extremities: WWP, no calf tenderness or edema, SCDs in place      I&O's Detail    14 Dec 2023 07:01  -  15 Dec 2023 07:00  --------------------------------------------------------  IN:    Fat Emulsion (Fish Oil &amp; Plant Based) 20% Infusion: 270.4 mL    IV PiggyBack: 999 mL    Oral Fluid: 640 mL    TPN (Total Parenteral Nutrition): 2060 mL  Total IN: 3969.4 mL    OUT:    Drain (mL): 950 mL    Drain (mL): 20 mL    Drain (mL): 1110 mL    Fat Emulsion (Fish Oil &amp; Plant Based) 20% Infusion: 0 mL    Voided (mL): 1325 mL  Total OUT: 3405 mL    Total NET: 564.4 mL      15 Dec 2023 07:01  -  15 Dec 2023 07:51  --------------------------------------------------------  IN:    TPN (Total Parenteral Nutrition): 121 mL  Total IN: 121 mL    OUT:  Total OUT: 0 mL    Total NET: 121 mL          LABS:                        8.2    4.99  )-----------( 177      ( 14 Dec 2023 05:30 )             26.3     12-14    133<L>  |  99  |  41<H>  ----------------------------<  128<H>  3.7   |  25  |  1.99<H>    Ca    8.4      14 Dec 2023 05:30  Phos  4.6     12-14  Mg     2.2     12-14    TPro  8.7<H>  /  Alb  2.8<L>  /  TBili  7.4<H>  /  DBili  5.0<H>  /  AST  105<H>  /  ALT  70<H>  /  AlkPhos  132<H>  12-14      Urinalysis Basic - ( 14 Dec 2023 05:30 )    Color: x / Appearance: x / SG: x / pH: x  Gluc: 128 mg/dL / Ketone: x  / Bili: x / Urobili: x   Blood: x / Protein: x / Nitrite: x   Leuk Esterase: x / RBC: x / WBC x   Sq Epi: x / Non Sq Epi: x / Bacteria: x        RADIOLOGY & ADDITIONAL STUDIES: SUBJECTIVE: Pt seen and examined at bedside this am by surgery team. Sitting upright in chair, no acute complaints, feels well today. Tolerating diet, pain well controlled. Denies f/n/v/cp/sob.    MEDICATIONS  (STANDING):  chlorhexidine 2% Cloths 1 Application(s) Topical <User Schedule>  cholestyramine Powder (Sugar-Free) 4 Gram(s) Oral daily  epoetin matt-epbx (RETACRIT) Injectable 09323 Unit(s) SubCutaneous every 7 days  ergocalciferol 45661 Unit(s) Oral <User Schedule>  glucagon  Injectable 1 milliGRAM(s) IntraMuscular once  heparin   Injectable 5000 Unit(s) SubCutaneous every 8 hours  levothyroxine Injectable 30 MICROGram(s) IV Push at bedtime  lipid, fat emulsion (Fish Oil and Plant Based) 20% Infusion 0.54 Gm/kG/Day (20.83 mL/Hr) IV Continuous <Continuous>  metoprolol tartrate Injectable 5 milliGRAM(s) IV Push every 6 hours  Parenteral Nutrition - Adult 1 Each TPN Continuous <Continuous>  ursodiol Suspension 300 milliGRAM(s) Oral every 8 hours  venlafaxine XR. 150 milliGRAM(s) Oral daily    MEDICATIONS  (PRN):  hydrALAZINE Injectable 10 milliGRAM(s) IV Push every 6 hours PRN SBP >170  lidocaine 2% Viscous 10 milliLiter(s) Swish and Spit every 12 hours PRN tongue pain  LORazepam   Injectable 0.5 milliGRAM(s) IV Push at bedtime PRN Anxiety  ondansetron Injectable 4 milliGRAM(s) IV Push every 6 hours PRN Nausea and/or Vomiting    Vital Signs Last 24 Hrs  T(C): 36.4 (15 Dec 2023 06:12), Max: 36.9 (14 Dec 2023 09:00)  T(F): 97.5 (15 Dec 2023 06:12), Max: 98.5 (14 Dec 2023 09:00)  HR: 99 (15 Dec 2023 07:00) (93 - 107)  BP: 148/68 (15 Dec 2023 07:00) (118/57 - 166/70)  BP(mean): 78 (15 Dec 2023 07:00) (78 - 103)  RR: 27 (15 Dec 2023 07:00) (17 - 48)  SpO2: 100% (15 Dec 2023 07:00) (93% - 100%)    Parameters below as of 15 Dec 2023 07:00  Patient On (Oxygen Delivery Method): room air    PHYSICAL EXAM:    Constitutional: A&Ox3, NAD    Respiratory: non labored breathing, no respiratory distress    Cardiovascular: NSR, RRR    Gastrointestinal: abdomen soft, nd, nt. No R/G. perc cony bilious    Extremities: WWP, no calf tenderness or edema, SCDs in place      I&O's Detail    14 Dec 2023 07:01  -  15 Dec 2023 07:00  --------------------------------------------------------  IN:    Fat Emulsion (Fish Oil &amp; Plant Based) 20% Infusion: 270.4 mL    IV PiggyBack: 999 mL    Oral Fluid: 640 mL    TPN (Total Parenteral Nutrition): 2060 mL  Total IN: 3969.4 mL    OUT:    Drain (mL): 950 mL    Drain (mL): 20 mL    Drain (mL): 1110 mL    Fat Emulsion (Fish Oil &amp; Plant Based) 20% Infusion: 0 mL    Voided (mL): 1325 mL  Total OUT: 3405 mL    Total NET: 564.4 mL      15 Dec 2023 07:01  -  15 Dec 2023 07:51  --------------------------------------------------------  IN:    TPN (Total Parenteral Nutrition): 121 mL  Total IN: 121 mL    OUT:  Total OUT: 0 mL    Total NET: 121 mL          LABS:                        8.2    4.99  )-----------( 177      ( 14 Dec 2023 05:30 )             26.3     12-14    133<L>  |  99  |  41<H>  ----------------------------<  128<H>  3.7   |  25  |  1.99<H>    Ca    8.4      14 Dec 2023 05:30  Phos  4.6     12-14  Mg     2.2     12-14    TPro  8.7<H>  /  Alb  2.8<L>  /  TBili  7.4<H>  /  DBili  5.0<H>  /  AST  105<H>  /  ALT  70<H>  /  AlkPhos  132<H>  12-14      Urinalysis Basic - ( 14 Dec 2023 05:30 )    Color: x / Appearance: x / SG: x / pH: x  Gluc: 128 mg/dL / Ketone: x  / Bili: x / Urobili: x   Blood: x / Protein: x / Nitrite: x   Leuk Esterase: x / RBC: x / WBC x   Sq Epi: x / Non Sq Epi: x / Bacteria: x        RADIOLOGY & ADDITIONAL STUDIES:

## 2023-12-15 NOTE — PROGRESS NOTE ADULT - ASSESSMENT
78M w PMH Crohn's, AFib/Flutter s/p DCCVs on amiodarone, remote ileocectomy and open appendectomy. Admitted (6/23) for SBO vs Crohns flare, s/p NGT decompression and s/p lap converted to open TRE, SBR x 3, left in discontinuity with abthera vac on (6/27), RTOR for ileocolic resection, small bowel anastomosis, and abdominal wall closure on (6/28), c/b fluid collection s/p IR aspiration of perihepatic fluid on (7/3), c/b wound dehiscence s/p RTOR exlap, washout, ileocolic resection with end ileostomy, blow hole colostomy, red rubber from ileostomy to small bowel anastomosis; vicryl bridging mesh on (7/5) transferred to SICU postoperatively for hemodynamic monitoring, with hospital course complicated by periods of severe ELVI and hyponatremia, which resolved but stepped back up for to SICU on (9/10) for acute AMS, intermittent hypoglycemia, AFib with RVR. Percutaneous Cholecystostomy placed on (9/11) for enlarged GB, PCT capped 10/5 and uncapped 10/25 for hyperbilirubinemia, Right brachial DVT, left basillic and cephalic superficial thrombus on duplex 11/2, CT 11/14 with ALDEN ground glass opacity of unclear etiology, completed empiric 7day cefepime course 11/22, rising T-bili of unclear etiology now w resolved candida glabrata fungemia, ELVI.     Care per SICU  TPN/Diet  Trend Cr

## 2023-12-15 NOTE — PROGRESS NOTE ADULT - ASSESSMENT
78M w PMH Crohn's, AFib/Flutter s/p DCCVs on amiodarone, remote ileocectomy and open appendectomy. Admitted (6/23) for SBO vs Crohns flare, s/p NGT decompression and s/p lap converted to open TRE, SBR x 3, left in discontinuity with abthera vac on (6/27), RTOR for ileocolic resection, small bowel anastomosis, and abdominal wall closure on (6/28), c/b fluid collection s/p IR aspiration of perihepatic fluid on (7/3), c/b wound dehiscence s/p RTOR exlap, washout, ileocolic resection with end ileostomy, blow hole colostomy, red rubber from ileostomy to small bowel anastomosis; vicryl bridging mesh on (7/5) transferred to SICU postoperatively for hemodynamic monitoring, with hospital course complicated by periods of severe ELVI and hyponatremia, which resolved but stepped back up for to SICU on (9/10) for acute AMS, intermittent hypoglycemia, AFib with RVR. Percutaneous Cholecystostomy placed on (9/11) for enlarged GB, PCT capped 10/5 and uncapped 10/25 for hyperbilirubinemia, Right brachial DVT, left basillic and cephalic superficial thrombus on duplex 11/2, CT 11/14 with ALDEN ground glass opacity of unclear etiology, completed empiric 7day cefepime course 11/22, rising T-bili of unclear etilogy, now w resolved candida glabrata fungemia, ELVI    NEURO: AMS resolved, likely septic encephalopathy, EEG neg. Hx of depression - cont Effexor. Nausea: Zofran PRN. Anxiety: Lorazepam PRN.  CV: Hx Afib/flutter: s/p DCCV, Amio stopped due to persistent transaminitis. Continue Metoprolol 5q6 with hold parameters. Hx HLD: Lipitor held given high LFTs. TTE  (7/18) - PASP 64mmHg, EF 65-70%, Hx HTN - stopped PO Norvasc as unlikely absorbing, holding Losartan. BP borderline - continue hydralazine PRN.   PULM: + Atelectasis noted: Cont Bipap QHS. Encourage OOB and IS.  CT from 11/14 with ALDEN ground glass opacity of unclear etiology. COVID negative. RVP negative. completed 7d empiric cefepime 11/22 to cover for potential PNA. Dyspnea improved.  GI/FEN: Low res, low fat, high protein diet. Cont Ensure max x1/day. Folate, thiamine. fungemia likely CLABSI. PICC replaced on 12/4 and continue TPN qd, lipids qd.  High output EC fistula: cont Octreotide & Cholestyramine 10/18. Transaminitis elevated T bili of unclear origin, s/p Perc Deb on 9/11; MRCP 10/30 no obstruction, seen by Hepatology started on ursodiol, RUQ US 11/25 CBD 5mm, hepatic vessels patent Repeat. MRCP neg. Monitor drainage from fistula, bolus as needed to keep I&O even.    : Voids. ELVI, Cr trending down. stopped famotidine and half Effexor dose given renal dysfunction. Caspo unlikely etiology of ELVI per ID. s/p brief bicarb gtt.  MARLO Duplex and RP US unremarkable, CK wnl.  f/u renal recs pending glomerulonephritis labs.  ENDO: Hx hypothyroid: IV Synthroid, TSH low on 11/30, decreased synthroid dose, repeat TSH 12/6 wnl.  ID: BCx 11/22 grew candida glabrata, likely CLABSI. ID consulted, s/p Caspo, switched to Fluconazole (12/1-12/9). Repeat surveillance blood cx NGTD. Ophthalmology evaulated - normal ocular exam. Echo no vegetation. PICC replaced on 12/4.  Recent MRSA swab negative, RVP panel negative, COVID negative. Cont Nystatin powder // PREVIOUSLY  caspofungin (11/24-12/1). completed empiric 7days cefepime (11/15-11/22), had C. tertium, Lactobacillis from his IR cx 7/3, and candida albicans, lactobacillus, vanc sensitive E faecium, vanc resistant E gallinarum, vanc resistant E casseliflavus, lactobacillus from his OR cx 7/5. Completed course of abx with Imipenem (9/10-9/15). Imipenem (8/26--) imipenem (6/30-7/12, 7/23-7/30), Dapto (6/30-7/5 and 7/23-7/24). Empiric dapto (8/23-24) and cefepime (8/23-24).   PPX: SCDs, SQH, was on Therapeutic lovenox bid for R brachial vein DVT, but will hold off on hep gtt since repeat US Duplex negative.  HEME: Anemia - continue Epogen weekly. S/p Iron Sucrose 200 qd x 3 days for chronic anemia.  LINES: PIVs, LUE PICC (12/4 - ) // DC: LUE PICC (9/1-11/1 ), RUE PICC: (11/1-11/24)   WOUNDS/DRAINS: Abdominal wound and fistula with wound manager in place dressing change Tuesday and Friday. RLQ Ostomy. IR perc deb tube. // DC: L Clay drain.  PT: Ambulate as tolerated OOB to chair daily.  DISPO: SICU due to complicated hospital course.

## 2023-12-15 NOTE — PROGRESS NOTE ADULT - NS ATTEND AMEND GEN_ALL_CORE FT
AF, UC s/p SBR, ileocolic resection, ileostomy, ATN, multiple infections with enteroatmospheric fistula  physical as above  continue TPN  follow Is and Os with RL or albumin bolus as needed if getting too negative  continue metoprolol  rest as above

## 2023-12-15 NOTE — PROGRESS NOTE ADULT - SUBJECTIVE AND OBJECTIVE BOX
SUBJECTIVE:  This morning, he feels well; no complaints of pain. No nausea or vomiting. No acute complaints.  Tolerating diet   Voiding spontaneously, appropriate urine output    MEDICATIONS  (STANDING):  chlorhexidine 2% Cloths 1 Application(s) Topical <User Schedule>  cholestyramine Powder (Sugar-Free) 4 Gram(s) Oral daily  epoetin matt-epbx (RETACRIT) Injectable 56654 Unit(s) SubCutaneous every 7 days  ergocalciferol 57952 Unit(s) Oral <User Schedule>  glucagon  Injectable 1 milliGRAM(s) IntraMuscular once  heparin   Injectable 5000 Unit(s) SubCutaneous every 8 hours  levothyroxine Injectable 30 MICROGram(s) IV Push at bedtime  lipid, fat emulsion (Fish Oil and Plant Based) 20% Infusion 0.54 Gm/kG/Day (20.83 mL/Hr) IV Continuous <Continuous>  metoprolol tartrate Injectable 5 milliGRAM(s) IV Push every 6 hours  Parenteral Nutrition - Adult 1 Each TPN Continuous <Continuous>  Parenteral Nutrition - Adult 1 Each TPN Continuous <Continuous>  ursodiol Suspension 300 milliGRAM(s) Oral every 8 hours  venlafaxine XR. 150 milliGRAM(s) Oral daily    MEDICATIONS  (PRN):  hydrALAZINE Injectable 10 milliGRAM(s) IV Push every 6 hours PRN SBP >170  lidocaine 2% Viscous 10 milliLiter(s) Swish and Spit every 12 hours PRN tongue pain  LORazepam   Injectable 0.5 milliGRAM(s) IV Push at bedtime PRN Anxiety  ondansetron Injectable 4 milliGRAM(s) IV Push every 6 hours PRN Nausea and/or Vomiting      Vital Signs Last 24 Hrs  T(C): 36.4 (15 Dec 2023 09:05), Max: 36.8 (14 Dec 2023 22:32)  T(F): 97.6 (15 Dec 2023 09:05), Max: 98.2 (14 Dec 2023 22:32)  HR: 100 (15 Dec 2023 12:00) (96 - 107)  BP: 149/70 (15 Dec 2023 12:00) (118/57 - 178/100)  BP(mean): 100 (15 Dec 2023 12:00) (78 - 131)  RR: 27 (15 Dec 2023 12:00) (17 - 48)  SpO2: 100% (15 Dec 2023 12:00) (93% - 100%)    Parameters below as of 15 Dec 2023 13:00  Patient On (Oxygen Delivery Method): room air      PHYSICAL EXAM:    Constitutional: A&Ox3, NAD    Respiratory: non labored breathing, no respiratory distress    Cardiovascular: NSR, RRR    Gastrointestinal: abdomen soft, nd, nt. No R/G. perc cony bilious. Midline with wound with pink, healthy granulation tissue, EC fistula noted with output of bilious fluid mixed with food.     Extremities: WWP, no calf tenderness or edema, SCDs in place    I&O's Summary    14 Dec 2023 07:01  -  15 Dec 2023 07:00  --------------------------------------------------------  IN: 3969.4 mL / OUT: 3405 mL / NET: 564.4 mL    15 Dec 2023 07:01  -  15 Dec 2023 12:27  --------------------------------------------------------  IN: 602 mL / OUT: 700 mL / NET: -98 mL        LABS:                        8.2    4.99  )-----------( 177      ( 14 Dec 2023 05:30 )             26.3     12-14    133<L>  |  99  |  41<H>  ----------------------------<  128<H>  3.7   |  25  |  1.99<H>    Ca    8.4      14 Dec 2023 05:30  Phos  4.6     12-14  Mg     2.2     12-14    TPro  8.7<H>  /  Alb  2.8<L>  /  TBili  7.4<H>  /  DBili  5.0<H>  /  AST  105<H>  /  ALT  70<H>  /  AlkPhos  132<H>  12-14      Urinalysis Basic - ( 14 Dec 2023 05:30 )    Color: x / Appearance: x / SG: x / pH: x  Gluc: 128 mg/dL / Ketone: x  / Bili: x / Urobili: x   Blood: x / Protein: x / Nitrite: x   Leuk Esterase: x / RBC: x / WBC x   Sq Epi: x / Non Sq Epi: x / Bacteria: x      CAPILLARY BLOOD GLUCOSE        LIVER FUNCTIONS - ( 14 Dec 2023 05:30 )  Alb: 2.8 g/dL / Pro: 8.7 g/dL / ALK PHOS: 132 U/L / ALT: 70 U/L / AST: 105 U/L / GGT: x             RADIOLOGY & ADDITIONAL STUDIES:   SUBJECTIVE:  This morning, he feels well; no complaints of pain. No nausea or vomiting. No acute complaints.  Tolerating diet   Voiding spontaneously, appropriate urine output    MEDICATIONS  (STANDING):  chlorhexidine 2% Cloths 1 Application(s) Topical <User Schedule>  cholestyramine Powder (Sugar-Free) 4 Gram(s) Oral daily  epoetin matt-epbx (RETACRIT) Injectable 12996 Unit(s) SubCutaneous every 7 days  ergocalciferol 88149 Unit(s) Oral <User Schedule>  glucagon  Injectable 1 milliGRAM(s) IntraMuscular once  heparin   Injectable 5000 Unit(s) SubCutaneous every 8 hours  levothyroxine Injectable 30 MICROGram(s) IV Push at bedtime  lipid, fat emulsion (Fish Oil and Plant Based) 20% Infusion 0.54 Gm/kG/Day (20.83 mL/Hr) IV Continuous <Continuous>  metoprolol tartrate Injectable 5 milliGRAM(s) IV Push every 6 hours  Parenteral Nutrition - Adult 1 Each TPN Continuous <Continuous>  Parenteral Nutrition - Adult 1 Each TPN Continuous <Continuous>  ursodiol Suspension 300 milliGRAM(s) Oral every 8 hours  venlafaxine XR. 150 milliGRAM(s) Oral daily    MEDICATIONS  (PRN):  hydrALAZINE Injectable 10 milliGRAM(s) IV Push every 6 hours PRN SBP >170  lidocaine 2% Viscous 10 milliLiter(s) Swish and Spit every 12 hours PRN tongue pain  LORazepam   Injectable 0.5 milliGRAM(s) IV Push at bedtime PRN Anxiety  ondansetron Injectable 4 milliGRAM(s) IV Push every 6 hours PRN Nausea and/or Vomiting      Vital Signs Last 24 Hrs  T(C): 36.4 (15 Dec 2023 09:05), Max: 36.8 (14 Dec 2023 22:32)  T(F): 97.6 (15 Dec 2023 09:05), Max: 98.2 (14 Dec 2023 22:32)  HR: 100 (15 Dec 2023 12:00) (96 - 107)  BP: 149/70 (15 Dec 2023 12:00) (118/57 - 178/100)  BP(mean): 100 (15 Dec 2023 12:00) (78 - 131)  RR: 27 (15 Dec 2023 12:00) (17 - 48)  SpO2: 100% (15 Dec 2023 12:00) (93% - 100%)    Parameters below as of 15 Dec 2023 13:00  Patient On (Oxygen Delivery Method): room air      PHYSICAL EXAM:    Constitutional: A&Ox3, NAD    Respiratory: non labored breathing, no respiratory distress    Cardiovascular: NSR, RRR    Gastrointestinal: abdomen soft, nd, nt. No R/G. perc cony bilious. Midline with wound with pink, healthy granulation tissue, EC fistula noted with output of bilious fluid mixed with food.     Extremities: WWP, no calf tenderness or edema, SCDs in place    I&O's Summary    14 Dec 2023 07:01  -  15 Dec 2023 07:00  --------------------------------------------------------  IN: 3969.4 mL / OUT: 3405 mL / NET: 564.4 mL    15 Dec 2023 07:01  -  15 Dec 2023 12:27  --------------------------------------------------------  IN: 602 mL / OUT: 700 mL / NET: -98 mL        LABS:                        8.2    4.99  )-----------( 177      ( 14 Dec 2023 05:30 )             26.3     12-14    133<L>  |  99  |  41<H>  ----------------------------<  128<H>  3.7   |  25  |  1.99<H>    Ca    8.4      14 Dec 2023 05:30  Phos  4.6     12-14  Mg     2.2     12-14    TPro  8.7<H>  /  Alb  2.8<L>  /  TBili  7.4<H>  /  DBili  5.0<H>  /  AST  105<H>  /  ALT  70<H>  /  AlkPhos  132<H>  12-14      Urinalysis Basic - ( 14 Dec 2023 05:30 )    Color: x / Appearance: x / SG: x / pH: x  Gluc: 128 mg/dL / Ketone: x  / Bili: x / Urobili: x   Blood: x / Protein: x / Nitrite: x   Leuk Esterase: x / RBC: x / WBC x   Sq Epi: x / Non Sq Epi: x / Bacteria: x      CAPILLARY BLOOD GLUCOSE        LIVER FUNCTIONS - ( 14 Dec 2023 05:30 )  Alb: 2.8 g/dL / Pro: 8.7 g/dL / ALK PHOS: 132 U/L / ALT: 70 U/L / AST: 105 U/L / GGT: x             RADIOLOGY & ADDITIONAL STUDIES:

## 2023-12-15 NOTE — ADVANCED PRACTICE NURSE CONSULT - ASSESSMENT
New Dustin's wound/fistula manager changed. Small amount of bloody drainage had begun to seep out from bottom of appliance. Upon removal, noted that areas of denudement in center of wound had been slightly bleeding. Pressure held then area dried with stoma powder. Stoma rings applied around fistulized stoma and Dustin's pouch applied over site. Pt tolerated procedure well. Effluent slightly thick. New Coloplast 1 piece soft convex appliance placed over ileostomy site after red rubber catheter introduced into stoma.  Never

## 2023-12-15 NOTE — PROGRESS NOTE ADULT - SUBJECTIVE AND OBJECTIVE BOX
S: No new issues/events overnight, no new med c/o    O: ICU Vital Signs Last 24 Hrs  T(F): 98.4 (12-15-23 @ 12:40), Max: 98.4 (12-15-23 @ 12:40)  HR: 100 (12-15-23 @ 12:00) (96 - 107)  BP: 149/70 (12-15-23 @ 12:00) (118/57 - 178/100)  BP(mean): 100 (12-15-23 @ 12:00) (78 - 131)  ABP: --  RR: 27 (12-15-23 @ 12:00) (17 - 48)  SpO2: 100% (12-15-23 @ 12:00) (93% - 100%)    PHYSICAL EXAM:   Neurological: AAOx3, CNII-XII intact,  strength 5/5 b/l  ENT: mucus membrane moist  Cardiovascular: RRR  Respiratory: CTA  Gastrointestinal: soft, NT, ND, BS+, fistula thickish yellowish output, no blood, perc cony bilious  Extremities: warm, no dependent edema  Vascular: no cyanosis/erythema  Skin: no rashes  MSK: no joint swelling.       LABS:    12-14    133<L>  |  99  |  41<H>  ----------------------------<  128<H>  3.7   |  25  |  1.99<H>    Ca    8.4      14 Dec 2023 05:30  Phos  4.6     12-14  Mg     2.2     12-14    TPro  8.7<H>  /  Alb  2.8<L>  /  TBili  7.4<H>  /  DBili  5.0<H>  /  AST  105<H>  /  ALT  70<H>  /  AlkPhos  132<H>  12-14  LIVER FUNCTIONS - ( 14 Dec 2023 05:30 )  Alb: 2.8 g/dL / Pro: 8.7 g/dL / ALK PHOS: 132 U/L / ALT: 70 U/L / AST: 105 U/L / GGT: x                               8.2    4.99  )-----------( 177      ( 14 Dec 2023 05:30 )             26.3     Urinalysis Basic - ( 14 Dec 2023 05:30 )    Color: x / Appearance: x / SG: x / pH: x  Gluc: 128 mg/dL / Ketone: x  / Bili: x / Urobili: x   Blood: x / Protein: x / Nitrite: x   Leuk Esterase: x / RBC: x / WBC x   Sq Epi: x / Non Sq Epi: x / Bacteria: x    CAPILLARY BLOOD GLUCOSE        MEDICATIONS  (STANDING):  chlorhexidine 2% Cloths 1 Application(s) Topical <User Schedule>  cholestyramine Powder (Sugar-Free) 4 Gram(s) Oral daily  epoetin matt-epbx (RETACRIT) Injectable 43406 Unit(s) SubCutaneous every 7 days  ergocalciferol 75554 Unit(s) Oral <User Schedule>  glucagon  Injectable 1 milliGRAM(s) IntraMuscular once  heparin   Injectable 5000 Unit(s) SubCutaneous every 8 hours  levothyroxine Injectable 30 MICROGram(s) IV Push at bedtime  lipid, fat emulsion (Fish Oil and Plant Based) 20% Infusion 0.54 Gm/kG/Day (20.83 mL/Hr) IV Continuous <Continuous>  metoprolol tartrate Injectable 5 milliGRAM(s) IV Push every 6 hours  Parenteral Nutrition - Adult 1 Each TPN Continuous <Continuous>  Parenteral Nutrition - Adult 1 Each TPN Continuous <Continuous>  ursodiol Suspension 300 milliGRAM(s) Oral every 8 hours  venlafaxine XR. 150 milliGRAM(s) Oral daily    MEDICATIONS  (PRN):  hydrALAZINE Injectable 10 milliGRAM(s) IV Push every 6 hours PRN SBP >170  lidocaine 2% Viscous 10 milliLiter(s) Swish and Spit every 12 hours PRN tongue pain  LORazepam   Injectable 0.5 milliGRAM(s) IV Push at bedtime PRN Anxiety  ondansetron Injectable 4 milliGRAM(s) IV Push every 6 hours PRN Nausea and/or Vomiting      Poole:	  [ x] None	[ ] Daily Poole Order Placed	   Indication:	  [ ] Strict I and O's    [ ] Obstruction     [ ] Incontinence + Stage 3 or 4 Decubitus  Central Line:  [ ] None	   [ x]  Medication / TPN Administration     [ ] No Peripheral IV        S: No new issues/events overnight, no new med c/o    O: ICU Vital Signs Last 24 Hrs  T(F): 98.4 (12-15-23 @ 12:40), Max: 98.4 (12-15-23 @ 12:40)  HR: 100 (12-15-23 @ 12:00) (96 - 107)  BP: 149/70 (12-15-23 @ 12:00) (118/57 - 178/100)  BP(mean): 100 (12-15-23 @ 12:00) (78 - 131)  ABP: --  RR: 27 (12-15-23 @ 12:00) (17 - 48)  SpO2: 100% (12-15-23 @ 12:00) (93% - 100%)    PHYSICAL EXAM:   Neurological: AAOx3, CNII-XII intact,  strength 5/5 b/l  ENT: mucus membrane moist  Cardiovascular: RRR  Respiratory: CTA  Gastrointestinal: soft, NT, ND, BS+, fistula thickish yellowish output, no blood, perc cony bilious  Extremities: warm, no dependent edema  Vascular: no cyanosis/erythema  Skin: no rashes  MSK: no joint swelling.       LABS:    12-14    133<L>  |  99  |  41<H>  ----------------------------<  128<H>  3.7   |  25  |  1.99<H>    Ca    8.4      14 Dec 2023 05:30  Phos  4.6     12-14  Mg     2.2     12-14    TPro  8.7<H>  /  Alb  2.8<L>  /  TBili  7.4<H>  /  DBili  5.0<H>  /  AST  105<H>  /  ALT  70<H>  /  AlkPhos  132<H>  12-14  LIVER FUNCTIONS - ( 14 Dec 2023 05:30 )  Alb: 2.8 g/dL / Pro: 8.7 g/dL / ALK PHOS: 132 U/L / ALT: 70 U/L / AST: 105 U/L / GGT: x                               8.2    4.99  )-----------( 177      ( 14 Dec 2023 05:30 )             26.3     Urinalysis Basic - ( 14 Dec 2023 05:30 )    Color: x / Appearance: x / SG: x / pH: x  Gluc: 128 mg/dL / Ketone: x  / Bili: x / Urobili: x   Blood: x / Protein: x / Nitrite: x   Leuk Esterase: x / RBC: x / WBC x   Sq Epi: x / Non Sq Epi: x / Bacteria: x    CAPILLARY BLOOD GLUCOSE        MEDICATIONS  (STANDING):  chlorhexidine 2% Cloths 1 Application(s) Topical <User Schedule>  cholestyramine Powder (Sugar-Free) 4 Gram(s) Oral daily  epoetin matt-epbx (RETACRIT) Injectable 71830 Unit(s) SubCutaneous every 7 days  ergocalciferol 87326 Unit(s) Oral <User Schedule>  glucagon  Injectable 1 milliGRAM(s) IntraMuscular once  heparin   Injectable 5000 Unit(s) SubCutaneous every 8 hours  levothyroxine Injectable 30 MICROGram(s) IV Push at bedtime  lipid, fat emulsion (Fish Oil and Plant Based) 20% Infusion 0.54 Gm/kG/Day (20.83 mL/Hr) IV Continuous <Continuous>  metoprolol tartrate Injectable 5 milliGRAM(s) IV Push every 6 hours  Parenteral Nutrition - Adult 1 Each TPN Continuous <Continuous>  Parenteral Nutrition - Adult 1 Each TPN Continuous <Continuous>  ursodiol Suspension 300 milliGRAM(s) Oral every 8 hours  venlafaxine XR. 150 milliGRAM(s) Oral daily    MEDICATIONS  (PRN):  hydrALAZINE Injectable 10 milliGRAM(s) IV Push every 6 hours PRN SBP >170  lidocaine 2% Viscous 10 milliLiter(s) Swish and Spit every 12 hours PRN tongue pain  LORazepam   Injectable 0.5 milliGRAM(s) IV Push at bedtime PRN Anxiety  ondansetron Injectable 4 milliGRAM(s) IV Push every 6 hours PRN Nausea and/or Vomiting      Poole:	  [ x] None	[ ] Daily Poole Order Placed	   Indication:	  [ ] Strict I and O's    [ ] Obstruction     [ ] Incontinence + Stage 3 or 4 Decubitus  Central Line:  [ ] None	   [ x]  Medication / TPN Administration     [ ] No Peripheral IV        S: No new issues/events overnight, no new med c/o. Less SOB with exertion, no CP    O: ICU Vital Signs Last 24 Hrs  T(F): 98.4 (12-15-23 @ 12:40), Max: 98.4 (12-15-23 @ 12:40)  HR: 100 (12-15-23 @ 12:00) (96 - 107)  BP: 149/70 (12-15-23 @ 12:00) (118/57 - 178/100)  BP(mean): 100 (12-15-23 @ 12:00) (78 - 131)  ABP: --  RR: 27 (12-15-23 @ 12:00) (17 - 48)  SpO2: 100% (12-15-23 @ 12:00) (93% - 100%)    PHYSICAL EXAM:   Neurological: AAOx3, CNII-XII intact,  strength 5/5 b/l  ENT: mucus membrane moist  Cardiovascular: RRR  Respiratory: CTA  Gastrointestinal: soft, NT, ND, BS+, fistula thickish yellowish output, no blood, perc cony bilious  Extremities: warm, no dependent edema  Vascular: no cyanosis/erythema  Skin: no rashes  MSK: no joint swelling.       LABS:    12-14    133<L>  |  99  |  41<H>  ----------------------------<  128<H>  3.7   |  25  |  1.99<H>    Ca    8.4      14 Dec 2023 05:30  Phos  4.6     12-14  Mg     2.2     12-14    TPro  8.7<H>  /  Alb  2.8<L>  /  TBili  7.4<H>  /  DBili  5.0<H>  /  AST  105<H>  /  ALT  70<H>  /  AlkPhos  132<H>  12-14  LIVER FUNCTIONS - ( 14 Dec 2023 05:30 )  Alb: 2.8 g/dL / Pro: 8.7 g/dL / ALK PHOS: 132 U/L / ALT: 70 U/L / AST: 105 U/L / GGT: x                               8.2    4.99  )-----------( 177      ( 14 Dec 2023 05:30 )             26.3     Urinalysis Basic - ( 14 Dec 2023 05:30 )    Color: x / Appearance: x / SG: x / pH: x  Gluc: 128 mg/dL / Ketone: x  / Bili: x / Urobili: x   Blood: x / Protein: x / Nitrite: x   Leuk Esterase: x / RBC: x / WBC x   Sq Epi: x / Non Sq Epi: x / Bacteria: x    CAPILLARY BLOOD GLUCOSE        MEDICATIONS  (STANDING):  chlorhexidine 2% Cloths 1 Application(s) Topical <User Schedule>  cholestyramine Powder (Sugar-Free) 4 Gram(s) Oral daily  epoetin matt-epbx (RETACRIT) Injectable 98973 Unit(s) SubCutaneous every 7 days  ergocalciferol 91019 Unit(s) Oral <User Schedule>  glucagon  Injectable 1 milliGRAM(s) IntraMuscular once  heparin   Injectable 5000 Unit(s) SubCutaneous every 8 hours  levothyroxine Injectable 30 MICROGram(s) IV Push at bedtime  lipid, fat emulsion (Fish Oil and Plant Based) 20% Infusion 0.54 Gm/kG/Day (20.83 mL/Hr) IV Continuous <Continuous>  metoprolol tartrate Injectable 5 milliGRAM(s) IV Push every 6 hours  Parenteral Nutrition - Adult 1 Each TPN Continuous <Continuous>  Parenteral Nutrition - Adult 1 Each TPN Continuous <Continuous>  ursodiol Suspension 300 milliGRAM(s) Oral every 8 hours  venlafaxine XR. 150 milliGRAM(s) Oral daily    MEDICATIONS  (PRN):  hydrALAZINE Injectable 10 milliGRAM(s) IV Push every 6 hours PRN SBP >170  lidocaine 2% Viscous 10 milliLiter(s) Swish and Spit every 12 hours PRN tongue pain  LORazepam   Injectable 0.5 milliGRAM(s) IV Push at bedtime PRN Anxiety  ondansetron Injectable 4 milliGRAM(s) IV Push every 6 hours PRN Nausea and/or Vomiting      Poole:	  [ x] None	[ ] Daily Poole Order Placed	   Indication:	  [ ] Strict I and O's    [ ] Obstruction     [ ] Incontinence + Stage 3 or 4 Decubitus  Central Line:  [ ] None	   [ x]  Medication / TPN Administration     [ ] No Peripheral IV        S: No new issues/events overnight, no new med c/o. Less SOB with exertion, no CP    O: ICU Vital Signs Last 24 Hrs  T(F): 98.4 (12-15-23 @ 12:40), Max: 98.4 (12-15-23 @ 12:40)  HR: 100 (12-15-23 @ 12:00) (96 - 107)  BP: 149/70 (12-15-23 @ 12:00) (118/57 - 178/100)  BP(mean): 100 (12-15-23 @ 12:00) (78 - 131)  ABP: --  RR: 27 (12-15-23 @ 12:00) (17 - 48)  SpO2: 100% (12-15-23 @ 12:00) (93% - 100%)    PHYSICAL EXAM:   Neurological: AAOx3, CNII-XII intact,  strength 5/5 b/l  ENT: mucus membrane moist  Cardiovascular: RRR  Respiratory: CTA  Gastrointestinal: soft, NT, ND, BS+, fistula thickish yellowish output, no blood, perc cony bilious  Extremities: warm, no dependent edema  Vascular: no cyanosis/erythema  Skin: no rashes  MSK: no joint swelling.       LABS:    12-14    133<L>  |  99  |  41<H>  ----------------------------<  128<H>  3.7   |  25  |  1.99<H>    Ca    8.4      14 Dec 2023 05:30  Phos  4.6     12-14  Mg     2.2     12-14    TPro  8.7<H>  /  Alb  2.8<L>  /  TBili  7.4<H>  /  DBili  5.0<H>  /  AST  105<H>  /  ALT  70<H>  /  AlkPhos  132<H>  12-14  LIVER FUNCTIONS - ( 14 Dec 2023 05:30 )  Alb: 2.8 g/dL / Pro: 8.7 g/dL / ALK PHOS: 132 U/L / ALT: 70 U/L / AST: 105 U/L / GGT: x                               8.2    4.99  )-----------( 177      ( 14 Dec 2023 05:30 )             26.3     Urinalysis Basic - ( 14 Dec 2023 05:30 )    Color: x / Appearance: x / SG: x / pH: x  Gluc: 128 mg/dL / Ketone: x  / Bili: x / Urobili: x   Blood: x / Protein: x / Nitrite: x   Leuk Esterase: x / RBC: x / WBC x   Sq Epi: x / Non Sq Epi: x / Bacteria: x    CAPILLARY BLOOD GLUCOSE        MEDICATIONS  (STANDING):  chlorhexidine 2% Cloths 1 Application(s) Topical <User Schedule>  cholestyramine Powder (Sugar-Free) 4 Gram(s) Oral daily  epoetin matt-epbx (RETACRIT) Injectable 93251 Unit(s) SubCutaneous every 7 days  ergocalciferol 83741 Unit(s) Oral <User Schedule>  glucagon  Injectable 1 milliGRAM(s) IntraMuscular once  heparin   Injectable 5000 Unit(s) SubCutaneous every 8 hours  levothyroxine Injectable 30 MICROGram(s) IV Push at bedtime  lipid, fat emulsion (Fish Oil and Plant Based) 20% Infusion 0.54 Gm/kG/Day (20.83 mL/Hr) IV Continuous <Continuous>  metoprolol tartrate Injectable 5 milliGRAM(s) IV Push every 6 hours  Parenteral Nutrition - Adult 1 Each TPN Continuous <Continuous>  Parenteral Nutrition - Adult 1 Each TPN Continuous <Continuous>  ursodiol Suspension 300 milliGRAM(s) Oral every 8 hours  venlafaxine XR. 150 milliGRAM(s) Oral daily    MEDICATIONS  (PRN):  hydrALAZINE Injectable 10 milliGRAM(s) IV Push every 6 hours PRN SBP >170  lidocaine 2% Viscous 10 milliLiter(s) Swish and Spit every 12 hours PRN tongue pain  LORazepam   Injectable 0.5 milliGRAM(s) IV Push at bedtime PRN Anxiety  ondansetron Injectable 4 milliGRAM(s) IV Push every 6 hours PRN Nausea and/or Vomiting      Poole:	  [ x] None	[ ] Daily Poole Order Placed	   Indication:	  [ ] Strict I and O's    [ ] Obstruction     [ ] Incontinence + Stage 3 or 4 Decubitus  Central Line:  [ ] None	   [ x]  Medication / TPN Administration     [ ] No Peripheral IV

## 2023-12-16 NOTE — PROGRESS NOTE ADULT - SUBJECTIVE AND OBJECTIVE BOX
S: No new issues/events overnight, no new med c/o    O: ICU Vital Signs Last 24 Hrs  T(F): 98.2 (12-16-23 @ 09:01), Max: 98.4 (12-15-23 @ 12:40)  HR: 99 (12-16-23 @ 09:00) (89 - 110)  BP: 195/78 (12-16-23 @ 09:00) (118/83 - 195/78)  BP(mean): 112 (12-16-23 @ 09:00) (85 - 112)  ABP: --  RR: 22 (12-16-23 @ 09:00) (19 - 37)  SpO2: 96% (12-16-23 @ 09:00) (90% - 100%)    PHYSICAL EXAM:   Neurological: AAOx3, CNII-XII intact,  strength 5/5 b/l  ENT: mucus membrane moist  Cardiovascular: RRR  Respiratory: CTA  Gastrointestinal: soft, NT, ND, BS+, fistula thickish yellowish output, no blood, perc cony bilious  Extremities: warm, no dependent edema  Vascular: no cyanosis/erythema  Skin: no rashes  MSK: no joint swelling.    LABS:    12-16    132<L>  |  99  |  44<H>  ----------------------------<  148<H>  3.8   |  24  |  1.72<H>    Ca    8.2<L>      16 Dec 2023 04:29  Phos  2.9     12-16  Mg     2.2     12-16    TPro  8.5<H>  /  Alb  2.6<L>  /  TBili  7.6<H>  /  DBili  5.4<H>  /  AST  108<H>  /  ALT  75<H>  /  AlkPhos  147<H>  12-16  LIVER FUNCTIONS - ( 16 Dec 2023 04:29 )  Alb: 2.6 g/dL / Pro: 8.5 g/dL / ALK PHOS: 147 U/L / ALT: 75 U/L / AST: 108 U/L / GGT: x                               8.2    4.70  )-----------( 187      ( 16 Dec 2023 04:29 )             25.6     Urinalysis Basic - ( 16 Dec 2023 04:29 )    Color: x / Appearance: x / SG: x / pH: x  Gluc: 148 mg/dL / Ketone: x  / Bili: x / Urobili: x   Blood: x / Protein: x / Nitrite: x   Leuk Esterase: x / RBC: x / WBC x   Sq Epi: x / Non Sq Epi: x / Bacteria: x    CAPILLARY BLOOD GLUCOSE        MEDICATIONS  (STANDING):  chlorhexidine 2% Cloths 1 Application(s) Topical <User Schedule>  cholestyramine Powder (Sugar-Free) 4 Gram(s) Oral daily  epoetin matt-epbx (RETACRIT) Injectable 37893 Unit(s) SubCutaneous every 7 days  ergocalciferol 10515 Unit(s) Oral <User Schedule>  glucagon  Injectable 1 milliGRAM(s) IntraMuscular once  heparin   Injectable 5000 Unit(s) SubCutaneous every 8 hours  labetalol Injectable 10 milliGRAM(s) IV Push every 6 hours  levothyroxine Injectable 30 MICROGram(s) IV Push at bedtime  lipid, fat emulsion (Fish Oil and Plant Based) 20% Infusion 0.54 Gm/kG/Day (20.83 mL/Hr) IV Continuous <Continuous>  lipid, fat emulsion (Fish Oil and Plant Based) 20% Infusion 0.54 Gm/kG/Day (20.83 mL/Hr) IV Continuous <Continuous>  Parenteral Nutrition - Adult 1 Each TPN Continuous <Continuous>  Parenteral Nutrition - Adult 1 Each TPN Continuous <Continuous>  ursodiol Suspension 300 milliGRAM(s) Oral every 8 hours  venlafaxine XR. 150 milliGRAM(s) Oral daily    MEDICATIONS  (PRN):  hydrALAZINE Injectable 10 milliGRAM(s) IV Push every 6 hours PRN SBP >170  lidocaine 2% Viscous 10 milliLiter(s) Swish and Spit every 12 hours PRN tongue pain  LORazepam   Injectable 0.5 milliGRAM(s) IV Push at bedtime PRN Anxiety  ondansetron Injectable 4 milliGRAM(s) IV Push every 6 hours PRN Nausea and/or Vomiting      Poole:	  [x ] None	[ ] Daily Poole Order Placed	   Indication:	  [ ] Strict I and O's    [ ] Obstruction     [ ] Incontinence + Stage 3 or 4 Decubitus  Central Line:  [ ] None	   [x ]  Medication / TPN Administration     [ ] No Peripheral IV        S: No new issues/events overnight, no new med c/o    O: ICU Vital Signs Last 24 Hrs  T(F): 98.2 (12-16-23 @ 09:01), Max: 98.4 (12-15-23 @ 12:40)  HR: 99 (12-16-23 @ 09:00) (89 - 110)  BP: 195/78 (12-16-23 @ 09:00) (118/83 - 195/78)  BP(mean): 112 (12-16-23 @ 09:00) (85 - 112)  ABP: --  RR: 22 (12-16-23 @ 09:00) (19 - 37)  SpO2: 96% (12-16-23 @ 09:00) (90% - 100%)    PHYSICAL EXAM:   Neurological: AAOx3, CNII-XII intact,  strength 5/5 b/l  ENT: mucus membrane moist  Cardiovascular: RRR  Respiratory: CTA  Gastrointestinal: soft, NT, ND, BS+, fistula thickish yellowish output, no blood, perc cony bilious  Extremities: warm, no dependent edema  Vascular: no cyanosis/erythema  Skin: no rashes  MSK: no joint swelling.    LABS:    12-16    132<L>  |  99  |  44<H>  ----------------------------<  148<H>  3.8   |  24  |  1.72<H>    Ca    8.2<L>      16 Dec 2023 04:29  Phos  2.9     12-16  Mg     2.2     12-16    TPro  8.5<H>  /  Alb  2.6<L>  /  TBili  7.6<H>  /  DBili  5.4<H>  /  AST  108<H>  /  ALT  75<H>  /  AlkPhos  147<H>  12-16  LIVER FUNCTIONS - ( 16 Dec 2023 04:29 )  Alb: 2.6 g/dL / Pro: 8.5 g/dL / ALK PHOS: 147 U/L / ALT: 75 U/L / AST: 108 U/L / GGT: x                               8.2    4.70  )-----------( 187      ( 16 Dec 2023 04:29 )             25.6     Urinalysis Basic - ( 16 Dec 2023 04:29 )    Color: x / Appearance: x / SG: x / pH: x  Gluc: 148 mg/dL / Ketone: x  / Bili: x / Urobili: x   Blood: x / Protein: x / Nitrite: x   Leuk Esterase: x / RBC: x / WBC x   Sq Epi: x / Non Sq Epi: x / Bacteria: x    CAPILLARY BLOOD GLUCOSE        MEDICATIONS  (STANDING):  chlorhexidine 2% Cloths 1 Application(s) Topical <User Schedule>  cholestyramine Powder (Sugar-Free) 4 Gram(s) Oral daily  epoetin matt-epbx (RETACRIT) Injectable 17217 Unit(s) SubCutaneous every 7 days  ergocalciferol 90674 Unit(s) Oral <User Schedule>  glucagon  Injectable 1 milliGRAM(s) IntraMuscular once  heparin   Injectable 5000 Unit(s) SubCutaneous every 8 hours  labetalol Injectable 10 milliGRAM(s) IV Push every 6 hours  levothyroxine Injectable 30 MICROGram(s) IV Push at bedtime  lipid, fat emulsion (Fish Oil and Plant Based) 20% Infusion 0.54 Gm/kG/Day (20.83 mL/Hr) IV Continuous <Continuous>  lipid, fat emulsion (Fish Oil and Plant Based) 20% Infusion 0.54 Gm/kG/Day (20.83 mL/Hr) IV Continuous <Continuous>  Parenteral Nutrition - Adult 1 Each TPN Continuous <Continuous>  Parenteral Nutrition - Adult 1 Each TPN Continuous <Continuous>  ursodiol Suspension 300 milliGRAM(s) Oral every 8 hours  venlafaxine XR. 150 milliGRAM(s) Oral daily    MEDICATIONS  (PRN):  hydrALAZINE Injectable 10 milliGRAM(s) IV Push every 6 hours PRN SBP >170  lidocaine 2% Viscous 10 milliLiter(s) Swish and Spit every 12 hours PRN tongue pain  LORazepam   Injectable 0.5 milliGRAM(s) IV Push at bedtime PRN Anxiety  ondansetron Injectable 4 milliGRAM(s) IV Push every 6 hours PRN Nausea and/or Vomiting      Poole:	  [x ] None	[ ] Daily Poole Order Placed	   Indication:	  [ ] Strict I and O's    [ ] Obstruction     [ ] Incontinence + Stage 3 or 4 Decubitus  Central Line:  [ ] None	   [x ]  Medication / TPN Administration     [ ] No Peripheral IV

## 2023-12-16 NOTE — PROGRESS NOTE ADULT - NS ATTEND AMEND GEN_ALL_CORE FT
Pt seen with team on rounds. Nontoxic and positive 500 ml. Eating breakfast. Again, Absorption unclear and to continue TPN.

## 2023-12-16 NOTE — PROGRESS NOTE ADULT - ASSESSMENT
78M w PMH Crohn's, AFib/Flutter s/p DCCVs on amiodarone, remote ileocectomy and open appendectomy. Admitted (6/23) for SBO vs Crohns flare, s/p NGT decompression and s/p lap converted to open TRE, SBR x 3, left in discontinuity with abthera vac on (6/27), RTOR for ileocolic resection, small bowel anastomosis, and abdominal wall closure on (6/28), c/b fluid collection s/p IR aspiration of perihepatic fluid on (7/3), c/b wound dehiscence s/p RTOR exlap, washout, ileocolic resection with end ileostomy, blow hole colostomy, red rubber from ileostomy to small bowel anastomosis; vicryl bridging mesh on (7/5) transferred to SICU postoperatively for hemodynamic monitoring, with hospital course complicated by periods of severe ELVI and hyponatremia, which resolved but stepped back up for to SICU on (9/10) for acute AMS, intermittent hypoglycemia, AFib with RVR. Percutaneous Cholecystostomy placed on (9/11) for enlarged GB, PCT capped 10/5 and uncapped 10/25 for hyperbilirubinemia, Right brachial DVT, left basillic and cephalic superficial thrombus on duplex 11/2, CT 11/14 with ALDEN ground glass opacity of unclear etiology, completed empiric 7day cefepime course 11/22, rising T-bili of unclear etilogy, now w resolved candida glabrata fungemia, ELVI    ***Pending  78M w PMH Crohn's, AFib/Flutter s/p DCCVs on amiodarone, remote ileocectomy and open appendectomy. Admitted (6/23) for SBO vs Crohns flare, s/p NGT decompression and s/p lap converted to open TRE, SBR x 3, left in discontinuity with abthera vac on (6/27), RTOR for ileocolic resection, small bowel anastomosis, and abdominal wall closure on (6/28), c/b fluid collection s/p IR aspiration of perihepatic fluid on (7/3), c/b wound dehiscence s/p RTOR exlap, washout, ileocolic resection with end ileostomy, blow hole colostomy, red rubber from ileostomy to small bowel anastomosis; vicryl bridging mesh on (7/5) transferred to SICU postoperatively for hemodynamic monitoring, with hospital course complicated by periods of severe ELVI and hyponatremia, which resolved but stepped back up for to SICU on (9/10) for acute AMS, intermittent hypoglycemia, AFib with RVR. Percutaneous Cholecystostomy placed on (9/11) for enlarged GB, PCT capped 10/5 and uncapped 10/25 for hyperbilirubinemia, Right brachial DVT, left basillic and cephalic superficial thrombus on duplex 11/2, CT 11/14 with ALDEN ground glass opacity of unclear etiology, completed empiric 7day cefepime course 11/22, rising T-bili of unclear etilogy, now w resolved candida glabrata fungemia, ELVI      C/w diet as tolerated   Wound care as per ostomy nurses   Continue to watch hydration status and repleting/restricting  continue with PT   rest of care per SICU, appreciate excellent care by SICU team

## 2023-12-16 NOTE — PROGRESS NOTE ADULT - SUBJECTIVE AND OBJECTIVE BOX
STATUS POST:  Exploratory laparotomy    Laparoscopic lysis of intestinal adhesions    Exploratory laparotomy    Open lysis of intestinal adhesions    Resection of small bowel    Temporary closure of abdominal cavity    Repair, anastomosis, ileocolic    Small bowel resection with anastomosis    Flap, myocutaneous, abdominal wall    Reconstruction of abdominal wall using mesh    Washout of abdominal cavity    Creation of ileostomy    Creation of colostomy    Laparoscopic lysis of intestinal adhesions    Open lysis of intestinal adhesions    Resection of small bowel    Temporary closure of abdominal cavity    Repair, anastomosis, ileocolic    Small bowel resection with anastomosis    Flap, myocutaneous, abdominal wall    Reconstruction of abdominal wall using mesh    Washout of abdominal cavity      SUBJECTIVE: Pt seen and examined at the bedside by surgical team. No acute complaints at this time, tolerating diet      Vital Signs Last 24 Hrs  T(C): 36.8 (16 Dec 2023 06:19), Max: 36.9 (15 Dec 2023 12:40)  T(F): 98.3 (16 Dec 2023 06:19), Max: 98.4 (15 Dec 2023 12:40)  HR: 99 (16 Dec 2023 08:00) (89 - 110)  BP: 158/74 (16 Dec 2023 08:00) (118/83 - 178/100)  BP(mean): 107 (16 Dec 2023 08:00) (85 - 131)  RR: 23 (16 Dec 2023 08:00) (18 - 37)  SpO2: 97% (16 Dec 2023 08:00) (90% - 100%)    Parameters below as of 16 Dec 2023 08:00  Patient On (Oxygen Delivery Method): room air        I&O's Summary    15 Dec 2023 07:01  -  16 Dec 2023 07:00  --------------------------------------------------------  IN: 5015.4 mL / OUT: 4590 mL / NET: 425.4 mL    16 Dec 2023 07:01  -  16 Dec 2023 08:17  --------------------------------------------------------  IN: 121 mL / OUT: 0 mL / NET: 121 mL        Physical Exam:  General Appearance: Appears well, NAD  Pulmonary: Nonlabored breathing, no respiratory distress  Cardiovascular: NSR, RRR  Abdomen: Soft, nondisteded, nontender, perc cony w/ bilious output, midline wound w/ granulation tissue  Extremities: WWP, SCD's in place     LABS:                        8.2    4.70  )-----------( 187      ( 16 Dec 2023 04:29 )             25.6     12-16    132<L>  |  99  |  44<H>  ----------------------------<  148<H>  3.8   |  24  |  1.72<H>    Ca    8.2<L>      16 Dec 2023 04:29  Phos  2.9     12-16  Mg     2.2     12-16    TPro  8.5<H>  /  Alb  2.6<L>  /  TBili  7.6<H>  /  DBili  5.4<H>  /  AST  108<H>  /  ALT  75<H>  /  AlkPhos  147<H>  12-16      Urinalysis Basic - ( 16 Dec 2023 04:29 )    Color: x / Appearance: x / SG: x / pH: x  Gluc: 148 mg/dL / Ketone: x  / Bili: x / Urobili: x   Blood: x / Protein: x / Nitrite: x   Leuk Esterase: x / RBC: x / WBC x   Sq Epi: x / Non Sq Epi: x / Bacteria: x

## 2023-12-17 NOTE — PROGRESS NOTE ADULT - SUBJECTIVE AND OBJECTIVE BOX
SUBJECTIVE:      MEDICATIONS  (STANDING):  chlorhexidine 2% Cloths 1 Application(s) Topical <User Schedule>  cholestyramine Powder (Sugar-Free) 4 Gram(s) Oral daily  epoetin matt-epbx (RETACRIT) Injectable 53811 Unit(s) SubCutaneous every 7 days  ergocalciferol 33143 Unit(s) Oral <User Schedule>  glucagon  Injectable 1 milliGRAM(s) IntraMuscular once  heparin   Injectable 5000 Unit(s) SubCutaneous every 8 hours  labetalol Injectable 10 milliGRAM(s) IV Push every 6 hours  levothyroxine Injectable 30 MICROGram(s) IV Push at bedtime  lipid, fat emulsion (Fish Oil and Plant Based) 20% Infusion 0.54 Gm/kG/Day (20.83 mL/Hr) IV Continuous <Continuous>  Parenteral Nutrition - Adult 1 Each TPN Continuous <Continuous>  ursodiol Suspension 300 milliGRAM(s) Oral every 8 hours  venlafaxine XR. 150 milliGRAM(s) Oral daily    MEDICATIONS  (PRN):  hydrALAZINE Injectable 10 milliGRAM(s) IV Push every 6 hours PRN SBP >170  lidocaine 2% Viscous 10 milliLiter(s) Swish and Spit every 12 hours PRN tongue pain  LORazepam   Injectable 0.5 milliGRAM(s) IV Push at bedtime PRN Anxiety  ondansetron Injectable 4 milliGRAM(s) IV Push every 6 hours PRN Nausea and/or Vomiting      Vital Signs Last 24 Hrs  T(C): 36.3 (17 Dec 2023 05:14), Max: 36.8 (16 Dec 2023 09:01)  T(F): 97.3 (17 Dec 2023 05:14), Max: 98.2 (16 Dec 2023 09:01)  HR: 100 (17 Dec 2023 08:00) (97 - 101)  BP: 149/65 (17 Dec 2023 08:00) (110/63 - 195/78)  BP(mean): 94 (17 Dec 2023 08:00) (80 - 112)  RR: 22 (17 Dec 2023 08:00) (18 - 48)  SpO2: 96% (17 Dec 2023 08:00) (92% - 99%)    Parameters below as of 17 Dec 2023 08:00  Patient On (Oxygen Delivery Method): room air        Physical Exam:  General: NAD, resting comfortably in bed  Pulmonary: Nonlabored breathing, no respiratory distress  Cardiovascular: NSR  Abdominal: soft, NT/ND  Extremities: WWP, normal strength  Neuro: A/O x 3, CNs II-XII grossly intact, no focal deficits    I&O's Summary    16 Dec 2023 07:01  -  17 Dec 2023 07:00  --------------------------------------------------------  IN: 5540.2 mL / OUT: 3635 mL / NET: 1905.2 mL    17 Dec 2023 07:01  -  17 Dec 2023 08:16  --------------------------------------------------------  IN: 121 mL / OUT: 0 mL / NET: 121 mL        LABS:                        8.2    4.70  )-----------( 187      ( 16 Dec 2023 04:29 )             25.6     12-16    132<L>  |  99  |  44<H>  ----------------------------<  148<H>  3.8   |  24  |  1.72<H>    Ca    8.2<L>      16 Dec 2023 04:29  Phos  2.9     12-16  Mg     2.2     12-16    TPro  8.5<H>  /  Alb  2.6<L>  /  TBili  7.6<H>  /  DBili  5.4<H>  /  AST  108<H>  /  ALT  75<H>  /  AlkPhos  147<H>  12-16      Urinalysis Basic - ( 16 Dec 2023 04:29 )    Color: x / Appearance: x / SG: x / pH: x  Gluc: 148 mg/dL / Ketone: x  / Bili: x / Urobili: x   Blood: x / Protein: x / Nitrite: x   Leuk Esterase: x / RBC: x / WBC x   Sq Epi: x / Non Sq Epi: x / Bacteria: x      CAPILLARY BLOOD GLUCOSE        LIVER FUNCTIONS - ( 16 Dec 2023 04:29 )  Alb: 2.6 g/dL / Pro: 8.5 g/dL / ALK PHOS: 147 U/L / ALT: 75 U/L / AST: 108 U/L / GGT: x             RADIOLOGY & ADDITIONAL STUDIES:   SUBJECTIVE:      MEDICATIONS  (STANDING):  chlorhexidine 2% Cloths 1 Application(s) Topical <User Schedule>  cholestyramine Powder (Sugar-Free) 4 Gram(s) Oral daily  epoetin matt-epbx (RETACRIT) Injectable 17761 Unit(s) SubCutaneous every 7 days  ergocalciferol 78987 Unit(s) Oral <User Schedule>  glucagon  Injectable 1 milliGRAM(s) IntraMuscular once  heparin   Injectable 5000 Unit(s) SubCutaneous every 8 hours  labetalol Injectable 10 milliGRAM(s) IV Push every 6 hours  levothyroxine Injectable 30 MICROGram(s) IV Push at bedtime  lipid, fat emulsion (Fish Oil and Plant Based) 20% Infusion 0.54 Gm/kG/Day (20.83 mL/Hr) IV Continuous <Continuous>  Parenteral Nutrition - Adult 1 Each TPN Continuous <Continuous>  ursodiol Suspension 300 milliGRAM(s) Oral every 8 hours  venlafaxine XR. 150 milliGRAM(s) Oral daily    MEDICATIONS  (PRN):  hydrALAZINE Injectable 10 milliGRAM(s) IV Push every 6 hours PRN SBP >170  lidocaine 2% Viscous 10 milliLiter(s) Swish and Spit every 12 hours PRN tongue pain  LORazepam   Injectable 0.5 milliGRAM(s) IV Push at bedtime PRN Anxiety  ondansetron Injectable 4 milliGRAM(s) IV Push every 6 hours PRN Nausea and/or Vomiting      Vital Signs Last 24 Hrs  T(C): 36.3 (17 Dec 2023 05:14), Max: 36.8 (16 Dec 2023 09:01)  T(F): 97.3 (17 Dec 2023 05:14), Max: 98.2 (16 Dec 2023 09:01)  HR: 100 (17 Dec 2023 08:00) (97 - 101)  BP: 149/65 (17 Dec 2023 08:00) (110/63 - 195/78)  BP(mean): 94 (17 Dec 2023 08:00) (80 - 112)  RR: 22 (17 Dec 2023 08:00) (18 - 48)  SpO2: 96% (17 Dec 2023 08:00) (92% - 99%)    Parameters below as of 17 Dec 2023 08:00  Patient On (Oxygen Delivery Method): room air        Physical Exam:  General: NAD, resting comfortably in bed  Pulmonary: Nonlabored breathing, no respiratory distress  Cardiovascular: NSR  Abdominal: soft, NT/ND  Extremities: WWP, normal strength  Neuro: A/O x 3, CNs II-XII grossly intact, no focal deficits    I&O's Summary    16 Dec 2023 07:01  -  17 Dec 2023 07:00  --------------------------------------------------------  IN: 5540.2 mL / OUT: 3635 mL / NET: 1905.2 mL    17 Dec 2023 07:01  -  17 Dec 2023 08:16  --------------------------------------------------------  IN: 121 mL / OUT: 0 mL / NET: 121 mL        LABS:                        8.2    4.70  )-----------( 187      ( 16 Dec 2023 04:29 )             25.6     12-16    132<L>  |  99  |  44<H>  ----------------------------<  148<H>  3.8   |  24  |  1.72<H>    Ca    8.2<L>      16 Dec 2023 04:29  Phos  2.9     12-16  Mg     2.2     12-16    TPro  8.5<H>  /  Alb  2.6<L>  /  TBili  7.6<H>  /  DBili  5.4<H>  /  AST  108<H>  /  ALT  75<H>  /  AlkPhos  147<H>  12-16      Urinalysis Basic - ( 16 Dec 2023 04:29 )    Color: x / Appearance: x / SG: x / pH: x  Gluc: 148 mg/dL / Ketone: x  / Bili: x / Urobili: x   Blood: x / Protein: x / Nitrite: x   Leuk Esterase: x / RBC: x / WBC x   Sq Epi: x / Non Sq Epi: x / Bacteria: x      CAPILLARY BLOOD GLUCOSE        LIVER FUNCTIONS - ( 16 Dec 2023 04:29 )  Alb: 2.6 g/dL / Pro: 8.5 g/dL / ALK PHOS: 147 U/L / ALT: 75 U/L / AST: 108 U/L / GGT: x             RADIOLOGY & ADDITIONAL STUDIES:   SUBJECTIVE:  Pt seen this AM on rounds . Pt endorses feeling well overnight and tolerating PO intake.     MEDICATIONS  (STANDING):  chlorhexidine 2% Cloths 1 Application(s) Topical <User Schedule>  cholestyramine Powder (Sugar-Free) 4 Gram(s) Oral daily  epoetin matt-epbx (RETACRIT) Injectable 99965 Unit(s) SubCutaneous every 7 days  ergocalciferol 67019 Unit(s) Oral <User Schedule>  glucagon  Injectable 1 milliGRAM(s) IntraMuscular once  heparin   Injectable 5000 Unit(s) SubCutaneous every 8 hours  labetalol Injectable 10 milliGRAM(s) IV Push every 6 hours  levothyroxine Injectable 30 MICROGram(s) IV Push at bedtime  lipid, fat emulsion (Fish Oil and Plant Based) 20% Infusion 0.54 Gm/kG/Day (20.83 mL/Hr) IV Continuous <Continuous>  Parenteral Nutrition - Adult 1 Each TPN Continuous <Continuous>  ursodiol Suspension 300 milliGRAM(s) Oral every 8 hours  venlafaxine XR. 150 milliGRAM(s) Oral daily    MEDICATIONS  (PRN):  hydrALAZINE Injectable 10 milliGRAM(s) IV Push every 6 hours PRN SBP >170  lidocaine 2% Viscous 10 milliLiter(s) Swish and Spit every 12 hours PRN tongue pain  LORazepam   Injectable 0.5 milliGRAM(s) IV Push at bedtime PRN Anxiety  ondansetron Injectable 4 milliGRAM(s) IV Push every 6 hours PRN Nausea and/or Vomiting      Vital Signs Last 24 Hrs  T(C): 36.3 (17 Dec 2023 05:14), Max: 36.8 (16 Dec 2023 09:01)  T(F): 97.3 (17 Dec 2023 05:14), Max: 98.2 (16 Dec 2023 09:01)  HR: 100 (17 Dec 2023 08:00) (97 - 101)  BP: 149/65 (17 Dec 2023 08:00) (110/63 - 195/78)  BP(mean): 94 (17 Dec 2023 08:00) (80 - 112)  RR: 22 (17 Dec 2023 08:00) (18 - 48)  SpO2: 96% (17 Dec 2023 08:00) (92% - 99%)    Parameters below as of 17 Dec 2023 08:00  Patient On (Oxygen Delivery Method): room air        Physical Exam:  General: NAD, resting comfortably in bed  Pulmonary: Nonlabored breathing, no respiratory distress  Cardiovascular: NSR  Abdominal: soft, NT/ND, Eakins bag output thick   Extremities: WWP, normal strength  Neuro: A/O x 3, CNs II-XII grossly intact, no focal deficits    I&O's Summary    16 Dec 2023 07:01  -  17 Dec 2023 07:00  --------------------------------------------------------  IN: 5540.2 mL / OUT: 3635 mL / NET: 1905.2 mL    17 Dec 2023 07:01  -  17 Dec 2023 08:16  --------------------------------------------------------  IN: 121 mL / OUT: 0 mL / NET: 121 mL        LABS:                        8.2    4.70  )-----------( 187      ( 16 Dec 2023 04:29 )             25.6     12-16    132<L>  |  99  |  44<H>  ----------------------------<  148<H>  3.8   |  24  |  1.72<H>    Ca    8.2<L>      16 Dec 2023 04:29  Phos  2.9     12-16  Mg     2.2     12-16    TPro  8.5<H>  /  Alb  2.6<L>  /  TBili  7.6<H>  /  DBili  5.4<H>  /  AST  108<H>  /  ALT  75<H>  /  AlkPhos  147<H>  12-16      Urinalysis Basic - ( 16 Dec 2023 04:29 )    Color: x / Appearance: x / SG: x / pH: x  Gluc: 148 mg/dL / Ketone: x  / Bili: x / Urobili: x   Blood: x / Protein: x / Nitrite: x   Leuk Esterase: x / RBC: x / WBC x   Sq Epi: x / Non Sq Epi: x / Bacteria: x      CAPILLARY BLOOD GLUCOSE        LIVER FUNCTIONS - ( 16 Dec 2023 04:29 )  Alb: 2.6 g/dL / Pro: 8.5 g/dL / ALK PHOS: 147 U/L / ALT: 75 U/L / AST: 108 U/L / GGT: x             RADIOLOGY & ADDITIONAL STUDIES:   SUBJECTIVE:  Pt seen this AM on rounds . Pt endorses feeling well overnight and tolerating PO intake.     MEDICATIONS  (STANDING):  chlorhexidine 2% Cloths 1 Application(s) Topical <User Schedule>  cholestyramine Powder (Sugar-Free) 4 Gram(s) Oral daily  epoetin matt-epbx (RETACRIT) Injectable 33153 Unit(s) SubCutaneous every 7 days  ergocalciferol 11316 Unit(s) Oral <User Schedule>  glucagon  Injectable 1 milliGRAM(s) IntraMuscular once  heparin   Injectable 5000 Unit(s) SubCutaneous every 8 hours  labetalol Injectable 10 milliGRAM(s) IV Push every 6 hours  levothyroxine Injectable 30 MICROGram(s) IV Push at bedtime  lipid, fat emulsion (Fish Oil and Plant Based) 20% Infusion 0.54 Gm/kG/Day (20.83 mL/Hr) IV Continuous <Continuous>  Parenteral Nutrition - Adult 1 Each TPN Continuous <Continuous>  ursodiol Suspension 300 milliGRAM(s) Oral every 8 hours  venlafaxine XR. 150 milliGRAM(s) Oral daily    MEDICATIONS  (PRN):  hydrALAZINE Injectable 10 milliGRAM(s) IV Push every 6 hours PRN SBP >170  lidocaine 2% Viscous 10 milliLiter(s) Swish and Spit every 12 hours PRN tongue pain  LORazepam   Injectable 0.5 milliGRAM(s) IV Push at bedtime PRN Anxiety  ondansetron Injectable 4 milliGRAM(s) IV Push every 6 hours PRN Nausea and/or Vomiting      Vital Signs Last 24 Hrs  T(C): 36.3 (17 Dec 2023 05:14), Max: 36.8 (16 Dec 2023 09:01)  T(F): 97.3 (17 Dec 2023 05:14), Max: 98.2 (16 Dec 2023 09:01)  HR: 100 (17 Dec 2023 08:00) (97 - 101)  BP: 149/65 (17 Dec 2023 08:00) (110/63 - 195/78)  BP(mean): 94 (17 Dec 2023 08:00) (80 - 112)  RR: 22 (17 Dec 2023 08:00) (18 - 48)  SpO2: 96% (17 Dec 2023 08:00) (92% - 99%)    Parameters below as of 17 Dec 2023 08:00  Patient On (Oxygen Delivery Method): room air        Physical Exam:  General: NAD, resting comfortably in bed  Pulmonary: Nonlabored breathing, no respiratory distress  Cardiovascular: NSR  Abdominal: soft, NT/ND, Eakins bag output thick   Extremities: WWP, normal strength  Neuro: A/O x 3, CNs II-XII grossly intact, no focal deficits    I&O's Summary    16 Dec 2023 07:01  -  17 Dec 2023 07:00  --------------------------------------------------------  IN: 5540.2 mL / OUT: 3635 mL / NET: 1905.2 mL    17 Dec 2023 07:01  -  17 Dec 2023 08:16  --------------------------------------------------------  IN: 121 mL / OUT: 0 mL / NET: 121 mL        LABS:                        8.2    4.70  )-----------( 187      ( 16 Dec 2023 04:29 )             25.6     12-16    132<L>  |  99  |  44<H>  ----------------------------<  148<H>  3.8   |  24  |  1.72<H>    Ca    8.2<L>      16 Dec 2023 04:29  Phos  2.9     12-16  Mg     2.2     12-16    TPro  8.5<H>  /  Alb  2.6<L>  /  TBili  7.6<H>  /  DBili  5.4<H>  /  AST  108<H>  /  ALT  75<H>  /  AlkPhos  147<H>  12-16      Urinalysis Basic - ( 16 Dec 2023 04:29 )    Color: x / Appearance: x / SG: x / pH: x  Gluc: 148 mg/dL / Ketone: x  / Bili: x / Urobili: x   Blood: x / Protein: x / Nitrite: x   Leuk Esterase: x / RBC: x / WBC x   Sq Epi: x / Non Sq Epi: x / Bacteria: x      CAPILLARY BLOOD GLUCOSE        LIVER FUNCTIONS - ( 16 Dec 2023 04:29 )  Alb: 2.6 g/dL / Pro: 8.5 g/dL / ALK PHOS: 147 U/L / ALT: 75 U/L / AST: 108 U/L / GGT: x             RADIOLOGY & ADDITIONAL STUDIES:

## 2023-12-17 NOTE — PROGRESS NOTE ADULT - SUBJECTIVE AND OBJECTIVE BOX
S: No new issues/events overnight, no new med c/o    O: ICU Vital Signs Last 24 Hrs  T(F): 97.9 (12-17-23 @ 08:29), Max: 98.1 (12-16-23 @ 18:35)  HR: 101 (12-17-23 @ 10:00) (97 - 101)  BP: 175/75 (12-17-23 @ 10:00) (110/63 - 177/74)  BP(mean): 108 (12-17-23 @ 10:00) (80 - 109)  ABP: --  RR: 20 (12-17-23 @ 10:00) (18 - 48)  SpO2: 98% (12-17-23 @ 10:00) (92% - 99%)    PHYSICAL EXAM:   Neurological: AAOx3, CNII-XII intact,  strength 5/5 b/l  ENT: mucus membrane moist  Cardiovascular: RRR  Respiratory: CTA  Gastrointestinal: soft, NT, ND, BS+, fistula thickish yellowish output, no blood, perc cony bilious  Extremities: warm, no dependent edema  Vascular: no cyanosis/erythema  Skin: no rashes  MSK: no joint swelling.      LABS:    12-16    132<L>  |  99  |  44<H>  ----------------------------<  148<H>  3.8   |  24  |  1.72<H>    Ca    8.2<L>      16 Dec 2023 04:29  Phos  2.9     12-16  Mg     2.2     12-16    TPro  8.5<H>  /  Alb  2.6<L>  /  TBili  7.6<H>  /  DBili  5.4<H>  /  AST  108<H>  /  ALT  75<H>  /  AlkPhos  147<H>  12-16  LIVER FUNCTIONS - ( 16 Dec 2023 04:29 )  Alb: 2.6 g/dL / Pro: 8.5 g/dL / ALK PHOS: 147 U/L / ALT: 75 U/L / AST: 108 U/L / GGT: x                               8.2    4.70  )-----------( 187      ( 16 Dec 2023 04:29 )             25.6     Urinalysis Basic - ( 16 Dec 2023 04:29 )    Color: x / Appearance: x / SG: x / pH: x  Gluc: 148 mg/dL / Ketone: x  / Bili: x / Urobili: x   Blood: x / Protein: x / Nitrite: x   Leuk Esterase: x / RBC: x / WBC x   Sq Epi: x / Non Sq Epi: x / Bacteria: x    CAPILLARY BLOOD GLUCOSE        MEDICATIONS  (STANDING):  chlorhexidine 2% Cloths 1 Application(s) Topical <User Schedule>  cholestyramine Powder (Sugar-Free) 4 Gram(s) Oral daily  epoetin matt-epbx (RETACRIT) Injectable 88578 Unit(s) SubCutaneous every 7 days  ergocalciferol 70700 Unit(s) Oral <User Schedule>  glucagon  Injectable 1 milliGRAM(s) IntraMuscular once  heparin   Injectable 5000 Unit(s) SubCutaneous every 8 hours  labetalol Injectable 10 milliGRAM(s) IV Push every 6 hours  levothyroxine Injectable 30 MICROGram(s) IV Push at bedtime  lipid, fat emulsion (Fish Oil and Plant Based) 20% Infusion 0.54 Gm/kG/Day (20.83 mL/Hr) IV Continuous <Continuous>  lipid, fat emulsion (Fish Oil and Plant Based) 20% Infusion 0.54 Gm/kG/Day (20.83 mL/Hr) IV Continuous <Continuous>  Parenteral Nutrition - Adult 1 Each TPN Continuous <Continuous>  Parenteral Nutrition - Adult 1 Each TPN Continuous <Continuous>  ursodiol Suspension 300 milliGRAM(s) Oral every 8 hours  venlafaxine XR. 150 milliGRAM(s) Oral daily    MEDICATIONS  (PRN):  hydrALAZINE Injectable 10 milliGRAM(s) IV Push every 6 hours PRN SBP >170  lidocaine 2% Viscous 10 milliLiter(s) Swish and Spit every 12 hours PRN tongue pain  LORazepam   Injectable 0.5 milliGRAM(s) IV Push at bedtime PRN Anxiety  ondansetron Injectable 4 milliGRAM(s) IV Push every 6 hours PRN Nausea and/or Vomiting      Poole:	  [x ] None	[ ] Daily Poole Order Placed	   Indication:	  [ ] Strict I and O's    [ ] Obstruction     [ ] Incontinence + Stage 3 or 4 Decubitus  Central Line:  [ ] None	   [ x]  Medication / TPN Administration     [ ] No Peripheral IV        S: No new issues/events overnight, no new med c/o    O: ICU Vital Signs Last 24 Hrs  T(F): 97.9 (12-17-23 @ 08:29), Max: 98.1 (12-16-23 @ 18:35)  HR: 101 (12-17-23 @ 10:00) (97 - 101)  BP: 175/75 (12-17-23 @ 10:00) (110/63 - 177/74)  BP(mean): 108 (12-17-23 @ 10:00) (80 - 109)  ABP: --  RR: 20 (12-17-23 @ 10:00) (18 - 48)  SpO2: 98% (12-17-23 @ 10:00) (92% - 99%)    PHYSICAL EXAM:   Neurological: AAOx3, CNII-XII intact,  strength 5/5 b/l  ENT: mucus membrane moist  Cardiovascular: RRR  Respiratory: CTA  Gastrointestinal: soft, NT, ND, BS+, fistula thickish yellowish output, no blood, perc cony bilious  Extremities: warm, no dependent edema  Vascular: no cyanosis/erythema  Skin: no rashes  MSK: no joint swelling.      LABS:    12-16    132<L>  |  99  |  44<H>  ----------------------------<  148<H>  3.8   |  24  |  1.72<H>    Ca    8.2<L>      16 Dec 2023 04:29  Phos  2.9     12-16  Mg     2.2     12-16    TPro  8.5<H>  /  Alb  2.6<L>  /  TBili  7.6<H>  /  DBili  5.4<H>  /  AST  108<H>  /  ALT  75<H>  /  AlkPhos  147<H>  12-16  LIVER FUNCTIONS - ( 16 Dec 2023 04:29 )  Alb: 2.6 g/dL / Pro: 8.5 g/dL / ALK PHOS: 147 U/L / ALT: 75 U/L / AST: 108 U/L / GGT: x                               8.2    4.70  )-----------( 187      ( 16 Dec 2023 04:29 )             25.6     Urinalysis Basic - ( 16 Dec 2023 04:29 )    Color: x / Appearance: x / SG: x / pH: x  Gluc: 148 mg/dL / Ketone: x  / Bili: x / Urobili: x   Blood: x / Protein: x / Nitrite: x   Leuk Esterase: x / RBC: x / WBC x   Sq Epi: x / Non Sq Epi: x / Bacteria: x    CAPILLARY BLOOD GLUCOSE        MEDICATIONS  (STANDING):  chlorhexidine 2% Cloths 1 Application(s) Topical <User Schedule>  cholestyramine Powder (Sugar-Free) 4 Gram(s) Oral daily  epoetin matt-epbx (RETACRIT) Injectable 76913 Unit(s) SubCutaneous every 7 days  ergocalciferol 11933 Unit(s) Oral <User Schedule>  glucagon  Injectable 1 milliGRAM(s) IntraMuscular once  heparin   Injectable 5000 Unit(s) SubCutaneous every 8 hours  labetalol Injectable 10 milliGRAM(s) IV Push every 6 hours  levothyroxine Injectable 30 MICROGram(s) IV Push at bedtime  lipid, fat emulsion (Fish Oil and Plant Based) 20% Infusion 0.54 Gm/kG/Day (20.83 mL/Hr) IV Continuous <Continuous>  lipid, fat emulsion (Fish Oil and Plant Based) 20% Infusion 0.54 Gm/kG/Day (20.83 mL/Hr) IV Continuous <Continuous>  Parenteral Nutrition - Adult 1 Each TPN Continuous <Continuous>  Parenteral Nutrition - Adult 1 Each TPN Continuous <Continuous>  ursodiol Suspension 300 milliGRAM(s) Oral every 8 hours  venlafaxine XR. 150 milliGRAM(s) Oral daily    MEDICATIONS  (PRN):  hydrALAZINE Injectable 10 milliGRAM(s) IV Push every 6 hours PRN SBP >170  lidocaine 2% Viscous 10 milliLiter(s) Swish and Spit every 12 hours PRN tongue pain  LORazepam   Injectable 0.5 milliGRAM(s) IV Push at bedtime PRN Anxiety  ondansetron Injectable 4 milliGRAM(s) IV Push every 6 hours PRN Nausea and/or Vomiting      Poole:	  [x ] None	[ ] Daily Poole Order Placed	   Indication:	  [ ] Strict I and O's    [ ] Obstruction     [ ] Incontinence + Stage 3 or 4 Decubitus  Central Line:  [ ] None	   [ x]  Medication / TPN Administration     [ ] No Peripheral IV

## 2023-12-17 NOTE — PROGRESS NOTE ADULT - ASSESSMENT
:78M w PMH Crohn's, AFib/Flutter s/p DCCVs on amiodarone, remote ileocectomy and open appendectomy. Admitted (6/23) for SBO vs Crohns flare, s/p NGT decompression and s/p lap converted to open TRE, SBR x 3, left in discontinuity with abthera vac on (6/27), RTOR for ileocolic resection, small bowel anastomosis, and abdominal wall closure on (6/28), c/b fluid collection s/p IR aspiration of perihepatic fluid on (7/3), c/b wound dehiscence s/p RTOR exlap, washout, ileocolic resection with end ileostomy, blow hole colostomy, red rubber from ileostomy to small bowel anastomosis; vicryl bridging mesh on (7/5) transferred to SICU postoperatively for hemodynamic monitoring, with hospital course complicated by periods of severe ELVI and hyponatremia, which resolved but stepped back up for to SICU on (9/10) for acute AMS, intermittent hypoglycemia, AFib with RVR. Percutaneous Cholecystostomy placed on (9/11) for enlarged GB, PCT capped 10/5 and uncapped 10/25 for hyperbilirubinemia, Right brachial DVT, left basillic and cephalic superficial thrombus on duplex 11/2, CT 11/14 with ALDEN ground glass opacity of unclear etiology, completed empiric 7day cefepime course 11/22, rising T-bili of unclear etilogy, now w resolved candida glabrata fungemia, ELVI.     - f/u consultants   - care per SICU

## 2023-12-18 NOTE — PROGRESS NOTE ADULT - ASSESSMENT
78M w PMH Crohn's, AFib/Flutter s/p DCCVs on amiodarone, remote ileocectomy and open appendectomy. Admitted (6/23) for SBO vs Crohns flare, s/p NGT decompression and s/p lap converted to open TRE, SBR x 3, left in discontinuity with abthera vac on (6/27), RTOR for ileocolic resection, small bowel anastomosis, and abdominal wall closure on (6/28), c/b fluid collection s/p IR aspiration of perihepatic fluid on (7/3), c/b wound dehiscence s/p RTOR exlap, washout, ileocolic resection with end ileostomy, blow hole colostomy, red rubber from ileostomy to small bowel anastomosis; vicryl bridging mesh on (7/5) transferred to SICU postoperatively for hemodynamic monitoring, with hospital course complicated by periods of severe ELVI and hyponatremia, which resolved but stepped back up for to SICU on (9/10) for acute AMS, intermittent hypoglycemia, AFib with RVR. Percutaneous Cholecystostomy placed on (9/11) for enlarged GB, PCT capped 10/5 and uncapped 10/25 for hyperbilirubinemia, Right brachial DVT, left basillic and cephalic superficial thrombus on duplex 11/2, CT 11/14 with ALDEN ground glass opacity of unclear etiology, completed empiric 7day cefepime course 11/22, rising T-bili of unclear etilogy, now w resolved candida glabrata fungemia, ELVI.     - f/u consultants   - care per SICU

## 2023-12-18 NOTE — PROGRESS NOTE ADULT - SUBJECTIVE AND OBJECTIVE BOX
SUBJECTIVE: Patient seen and examined bedside by chief resident.    heparin   Injectable 5000 Unit(s) SubCutaneous every 8 hours  hydrALAZINE Injectable 10 milliGRAM(s) IV Push every 6 hours PRN  labetalol Injectable 10 milliGRAM(s) IV Push every 6 hours    MEDICATIONS  (PRN):  hydrALAZINE Injectable 10 milliGRAM(s) IV Push every 6 hours PRN SBP >170  lidocaine 2% Viscous 10 milliLiter(s) Swish and Spit every 12 hours PRN tongue pain  LORazepam   Injectable 0.5 milliGRAM(s) IV Push at bedtime PRN Anxiety  ondansetron Injectable 4 milliGRAM(s) IV Push every 6 hours PRN Nausea and/or Vomiting      I&O's Detail    17 Dec 2023 07:01  -  18 Dec 2023 07:00  --------------------------------------------------------  IN:    Fat Emulsion (Fish Oil &amp; Plant Based) 20% Infusion: 124.8 mL    Oral Fluid: 780 mL    Sodium Chloride 0.9% Bolus: 1000 mL    TPN (Total Parenteral Nutrition): 850 mL  Total IN: 2754.8 mL    OUT:    Drain (mL): 725 mL    Drain (mL): 450 mL    Voided (mL): 865 mL  Total OUT: 2040 mL    Total NET: 714.8 mL          Vital Signs Last 24 Hrs  T(C): 36.7 (18 Dec 2023 05:01), Max: 36.9 (17 Dec 2023 13:03)  T(F): 98.1 (18 Dec 2023 05:01), Max: 98.5 (17 Dec 2023 13:03)  HR: 98 (18 Dec 2023 05:42) (93 - 101)  BP: 156/72 (18 Dec 2023 05:00) (123/87 - 177/78)  BP(mean): 103 (18 Dec 2023 05:00) (83 - 116)  RR: 20 (18 Dec 2023 05:42) (18 - 25)  SpO2: 100% (18 Dec 2023 05:42) (93% - 100%)    Parameters below as of 18 Dec 2023 05:42  Patient On (Oxygen Delivery Method): room air    Physical Exam:  General: NAD, resting comfortably in bed  Pulmonary: Nonlabored breathing, no respiratory distress  Cardiovascular: NSR  Abdominal: soft, NT/ND, Eakins bag output thick, ileostomy with red rubber in place minimal output, perc cony tube in place with bilious output  Extremities: WWP, normal strength  Neuro: A/O x 3, CNs II-XII grossly intact, no focal deficits    LABS:    12-18    133<L>  |  100  |  38<H>  ----------------------------<  144<H>  4.0   |  26  |  1.58<H>    Ca    8.2<L>      18 Dec 2023 05:30  Phos  3.1     12-18  Mg     2.1     12-18    TPro  8.6<H>  /  Alb  2.5<L>  /  TBili  7.7<H>  /  DBili  5.2<H>  /  AST  114<H>  /  ALT  77<H>  /  AlkPhos  140<H>  12-18      Urinalysis Basic - ( 18 Dec 2023 05:30 )    Color: x / Appearance: x / SG: x / pH: x  Gluc: 144 mg/dL / Ketone: x  / Bili: x / Urobili: x   Blood: x / Protein: x / Nitrite: x   Leuk Esterase: x / RBC: x / WBC x   Sq Epi: x / Non Sq Epi: x / Bacteria: x        RADIOLOGY & ADDITIONAL STUDIES:

## 2023-12-18 NOTE — ADVANCED PRACTICE NURSE CONSULT - ASSESSMENT
Pt reported wound/fistula manager had begun to leak yesterday and was patched by nursing staff. Bloody drainage noted at right side of appliance and when it was removed, oozing of blood observed over granulation near fistula. Silver Nitrate applied to sites after pressure was applied then stoma powder dusted over moist granulating tissue and stoma rings applied over this. Coloplast wound/fistula manager applied around fistula site.

## 2023-12-18 NOTE — PROGRESS NOTE ADULT - SUBJECTIVE AND OBJECTIVE BOX
SUBJECTIVE:  AMRITA overnight, receiving PRN bolus to maintain adequate hydration status   Small area of punctate bleeding controlled with silver nitrate around the midline wound     MEDICATIONS  (STANDING):  chlorhexidine 2% Cloths 1 Application(s) Topical <User Schedule>  cholestyramine Powder (Sugar-Free) 4 Gram(s) Oral daily  epoetin matt-epbx (RETACRIT) Injectable 15071 Unit(s) SubCutaneous every 7 days  ergocalciferol 90132 Unit(s) Oral <User Schedule>  glucagon  Injectable 1 milliGRAM(s) IntraMuscular once  heparin   Injectable 5000 Unit(s) SubCutaneous every 8 hours  labetalol Injectable 10 milliGRAM(s) IV Push every 6 hours  levothyroxine Injectable 30 MICROGram(s) IV Push at bedtime  lipid, fat emulsion (Fish Oil and Plant Based) 20% Infusion 0.54 Gm/kG/Day (20.83 mL/Hr) IV Continuous <Continuous>  Parenteral Nutrition - Adult 1 Each TPN Continuous <Continuous>  Parenteral Nutrition - Adult 1 Each TPN Continuous <Continuous>  ursodiol Suspension 300 milliGRAM(s) Oral every 8 hours  venlafaxine XR. 150 milliGRAM(s) Oral daily    MEDICATIONS  (PRN):  hydrALAZINE Injectable 10 milliGRAM(s) IV Push every 6 hours PRN SBP >170  lidocaine 2% Viscous 10 milliLiter(s) Swish and Spit every 12 hours PRN tongue pain  LORazepam   Injectable 0.5 milliGRAM(s) IV Push at bedtime PRN Anxiety  ondansetron Injectable 4 milliGRAM(s) IV Push every 6 hours PRN Nausea and/or Vomiting      Vital Signs Last 24 Hrs  T(C): 36.7 (18 Dec 2023 17:50), Max: 36.9 (18 Dec 2023 00:55)  T(F): 98.1 (18 Dec 2023 17:50), Max: 98.4 (18 Dec 2023 00:55)  HR: 98 (18 Dec 2023 16:25) (96 - 100)  BP: 156/69 (18 Dec 2023 16:00) (123/87 - 177/78)  BP(mean): 99 (18 Dec 2023 16:00) (83 - 112)  RR: 20 (18 Dec 2023 16:25) (18 - 28)  SpO2: 97% (18 Dec 2023 16:25) (93% - 100%)    Parameters below as of 18 Dec 2023 16:25  Patient On (Oxygen Delivery Method): room air        Physical Exam:  Physical Exam:  General: NAD, resting comfortably in bed  Pulmonary: Nonlabored breathing, no respiratory distress  Cardiovascular: NSR  Abdominal: soft, NT/ND, Eakins bag output thick, perc cony in place with bilious output   Extremities: WWP, normal strength  Neuro: A/O x 3, CNs II-XII grossly intact, no focal deficitss    I&O's Summary    17 Dec 2023 07:01  -  18 Dec 2023 07:00  --------------------------------------------------------  IN: 4083.8 mL / OUT: 2590 mL / NET: 1493.8 mL    18 Dec 2023 07:01  -  18 Dec 2023 18:03  --------------------------------------------------------  IN: 845 mL / OUT: 1625 mL / NET: -780 mL        LABS:    12-18    133<L>  |  100  |  38<H>  ----------------------------<  144<H>  4.0   |  26  |  1.58<H>    Ca    8.2<L>      18 Dec 2023 05:30  Phos  3.1     12-18  Mg     2.1     12-18    TPro  8.6<H>  /  Alb  2.5<L>  /  TBili  7.7<H>  /  DBili  5.2<H>  /  AST  114<H>  /  ALT  77<H>  /  AlkPhos  140<H>  12-18      Urinalysis Basic - ( 18 Dec 2023 05:30 )    Color: x / Appearance: x / SG: x / pH: x  Gluc: 144 mg/dL / Ketone: x  / Bili: x / Urobili: x   Blood: x / Protein: x / Nitrite: x   Leuk Esterase: x / RBC: x / WBC x   Sq Epi: x / Non Sq Epi: x / Bacteria: x      CAPILLARY BLOOD GLUCOSE        LIVER FUNCTIONS - ( 18 Dec 2023 05:30 )  Alb: 2.5 g/dL / Pro: 8.6 g/dL / ALK PHOS: 140 U/L / ALT: 77 U/L / AST: 114 U/L / GGT: x             RADIOLOGY & ADDITIONAL STUDIES:   SUBJECTIVE:  AMRITA overnight, receiving PRN bolus to maintain adequate hydration status   Small area of punctate bleeding controlled with silver nitrate around the midline wound     MEDICATIONS  (STANDING):  chlorhexidine 2% Cloths 1 Application(s) Topical <User Schedule>  cholestyramine Powder (Sugar-Free) 4 Gram(s) Oral daily  epoetin matt-epbx (RETACRIT) Injectable 46375 Unit(s) SubCutaneous every 7 days  ergocalciferol 74310 Unit(s) Oral <User Schedule>  glucagon  Injectable 1 milliGRAM(s) IntraMuscular once  heparin   Injectable 5000 Unit(s) SubCutaneous every 8 hours  labetalol Injectable 10 milliGRAM(s) IV Push every 6 hours  levothyroxine Injectable 30 MICROGram(s) IV Push at bedtime  lipid, fat emulsion (Fish Oil and Plant Based) 20% Infusion 0.54 Gm/kG/Day (20.83 mL/Hr) IV Continuous <Continuous>  Parenteral Nutrition - Adult 1 Each TPN Continuous <Continuous>  Parenteral Nutrition - Adult 1 Each TPN Continuous <Continuous>  ursodiol Suspension 300 milliGRAM(s) Oral every 8 hours  venlafaxine XR. 150 milliGRAM(s) Oral daily    MEDICATIONS  (PRN):  hydrALAZINE Injectable 10 milliGRAM(s) IV Push every 6 hours PRN SBP >170  lidocaine 2% Viscous 10 milliLiter(s) Swish and Spit every 12 hours PRN tongue pain  LORazepam   Injectable 0.5 milliGRAM(s) IV Push at bedtime PRN Anxiety  ondansetron Injectable 4 milliGRAM(s) IV Push every 6 hours PRN Nausea and/or Vomiting      Vital Signs Last 24 Hrs  T(C): 36.7 (18 Dec 2023 17:50), Max: 36.9 (18 Dec 2023 00:55)  T(F): 98.1 (18 Dec 2023 17:50), Max: 98.4 (18 Dec 2023 00:55)  HR: 98 (18 Dec 2023 16:25) (96 - 100)  BP: 156/69 (18 Dec 2023 16:00) (123/87 - 177/78)  BP(mean): 99 (18 Dec 2023 16:00) (83 - 112)  RR: 20 (18 Dec 2023 16:25) (18 - 28)  SpO2: 97% (18 Dec 2023 16:25) (93% - 100%)    Parameters below as of 18 Dec 2023 16:25  Patient On (Oxygen Delivery Method): room air        Physical Exam:  Physical Exam:  General: NAD, resting comfortably in bed  Pulmonary: Nonlabored breathing, no respiratory distress  Cardiovascular: NSR  Abdominal: soft, NT/ND, Eakins bag output thick, perc cony in place with bilious output   Extremities: WWP, normal strength  Neuro: A/O x 3, CNs II-XII grossly intact, no focal deficitss    I&O's Summary    17 Dec 2023 07:01  -  18 Dec 2023 07:00  --------------------------------------------------------  IN: 4083.8 mL / OUT: 2590 mL / NET: 1493.8 mL    18 Dec 2023 07:01  -  18 Dec 2023 18:03  --------------------------------------------------------  IN: 845 mL / OUT: 1625 mL / NET: -780 mL        LABS:    12-18    133<L>  |  100  |  38<H>  ----------------------------<  144<H>  4.0   |  26  |  1.58<H>    Ca    8.2<L>      18 Dec 2023 05:30  Phos  3.1     12-18  Mg     2.1     12-18    TPro  8.6<H>  /  Alb  2.5<L>  /  TBili  7.7<H>  /  DBili  5.2<H>  /  AST  114<H>  /  ALT  77<H>  /  AlkPhos  140<H>  12-18      Urinalysis Basic - ( 18 Dec 2023 05:30 )    Color: x / Appearance: x / SG: x / pH: x  Gluc: 144 mg/dL / Ketone: x  / Bili: x / Urobili: x   Blood: x / Protein: x / Nitrite: x   Leuk Esterase: x / RBC: x / WBC x   Sq Epi: x / Non Sq Epi: x / Bacteria: x      CAPILLARY BLOOD GLUCOSE        LIVER FUNCTIONS - ( 18 Dec 2023 05:30 )  Alb: 2.5 g/dL / Pro: 8.6 g/dL / ALK PHOS: 140 U/L / ALT: 77 U/L / AST: 114 U/L / GGT: x             RADIOLOGY & ADDITIONAL STUDIES:

## 2023-12-18 NOTE — PROGRESS NOTE ADULT - ASSESSMENT
78M w PMH Crohn's, AFib/Flutter s/p DCCVs on amiodarone, remote ileocectomy and open appendectomy. Admitted (6/23) for SBO vs Crohns flare, s/p NGT decompression and s/p lap converted to open TRE, SBR x 3, left in discontinuity with abthera vac on (6/27), RTOR for ileocolic resection, small bowel anastomosis, and abdominal wall closure on (6/28), c/b fluid collection s/p IR aspiration of perihepatic fluid on (7/3), c/b wound dehiscence s/p RTOR exlap, washout, ileocolic resection with end ileostomy, blow hole colostomy, red rubber from ileostomy to small bowel anastomosis; vicryl bridging mesh on (7/5) transferred to SICU postoperatively for hemodynamic monitoring, with hospital course complicated by periods of severe ELVI and hyponatremia, which resolved but stepped back up for to SICU on (9/10) for acute AMS, intermittent hypoglycemia, AFib with RVR. Percutaneous Cholecystostomy placed on (9/11) for enlarged GB, PCT capped 10/5 and uncapped 10/25 for hyperbilirubinemia, Right brachial DVT, left basillic and cephalic superficial thrombus on duplex 11/2, CT 11/14 with ALDEN ground glass opacity of unclear etiology, completed empiric 7day cefepime course 11/22, rising T-bili of unclear etilogy, now w resolved candida glabrata fungemia, ELVI    NEURO: AMS resolved, likely septic encephalopathy, EEG neg. Hx of depression - cont Effexor. Nausea: Zofran PRN. Anxiety: Lorazepam PRN.  CV: Hx Afib/flutter: s/p DCCV, Amio stopped due to persistent transaminitis. Continue Metoprolol 5q6 with hold parameters. Hx HLD: Lipitor held given high LFTs. TTE  (7/18) - PASP 64mmHg, EF 65-70%, Hx HTN - stopped PO Norvasc as unlikely absorbing, holding Losartan. BP borderline - continue hydralazine PRN.   PULM: + Atelectasis noted: Cont Bipap QHS. Encourage OOB and IS.  CT from 11/14 with ALDEN ground glass opacity of unclear etiology. COVID negative. RVP negative. completed 7d empiric cefepime 11/22 to cover for potential PNA. Dyspnea improved.  GI/FEN: Low res, low fat, high protein diet. Cont Ensure max x1/day. Folate, thiamine. fungemia likely CLABSI. PICC replaced on 12/4 and continue TPN qd, lipids qd.  High output EC fistula: cont Octreotide & Cholestyramine 10/18. Transaminitis elevated T bili of unclear origin, s/p Perc Deb on 9/11; MRCP 10/30 no obstruction, seen by Hepatology started on ursodiol, RUQ US 11/25 CBD 5mm, hepatic vessels patent Repeat. MRCP neg. Monitor drainage from fistula, bolus as needed to keep I&O even.    : Voids. ELVI, Cr trending down. stopped famotidine and half Effexor dose given renal dysfunction. Caspo unlikely etiology of ELVI per ID. s/p brief bicarb gtt.  MARLO Duplex and RP US unremarkable, CK wnl.  f/u renal recs pending glomerulonephritis labs.  ENDO: Hx hypothyroid: IV Synthroid, TSH low on 11/30, decreased synthroid dose, repeat TSH 12/6 wnl.  ID: BCx 11/22 grew candida glabrata, likely CLABSI. ID consulted, s/p Caspo, switched to Fluconazole (12/1-12/9). Repeat surveillance blood cx NGTD. Ophthalmology evaulated - normal ocular exam. Echo no vegetation. PICC replaced on 12/4.  Recent MRSA swab negative, RVP panel negative, COVID negative. Cont Nystatin powder // PREVIOUSLY  caspofungin (11/24-12/1). completed empiric 7days cefepime (11/15-11/22), had C. tertium, Lactobacillis from his IR cx 7/3, and candida albicans, lactobacillus, vanc sensitive E faecium, vanc resistant E gallinarum, vanc resistant E casseliflavus, lactobacillus from his OR cx 7/5. Completed course of abx with Imipenem (9/10-9/15). Imipenem (8/26--) imipenem (6/30-7/12, 7/23-7/30), Dapto (6/30-7/5 and 7/23-7/24). Empiric dapto (8/23-24) and cefepime (8/23-24).   PPX: SCDs, SQH, was on Therapeutic lovenox bid for R brachial vein DVT, but will hold off on hep gtt since repeat US Duplex negative.  HEME: Anemia - continue Epogen weekly. S/p Iron Sucrose 200 qd x 3 days for chronic anemia.  LINES: PIVs, LUE PICC (12/4 - ) // DC: LUE PICC (9/1-11/1 ), RUE PICC: (11/1-11/24)   WOUNDS/DRAINS: Abdominal wound and fistula with wound manager in place dressing change Tuesday and Friday. RLQ Ostomy. IR perc deb tube. // DC: L Clay drain.  PT: Ambulate as tolerated OOB to chair daily.  DISPO: SICU due to complicated hospital course.     78M w PMH Crohn's, AFib/Flutter s/p DCCVs on amiodarone, remote ileocectomy and open appendectomy. Admitted (6/23) for SBO vs Crohns flare, s/p NGT decompression and s/p lap converted to open TRE, SBR x 3, left in discontinuity with abthera vac on (6/27), RTOR for ileocolic resection, small bowel anastomosis, and abdominal wall closure on (6/28), c/b fluid collection s/p IR aspiration of perihepatic fluid on (7/3), c/b wound dehiscence s/p RTOR exlap, washout, ileocolic resection with end ileostomy, blow hole colostomy, red rubber from ileostomy to small bowel anastomosis; vicryl bridging mesh on (7/5) transferred to SICU postoperatively for hemodynamic monitoring, with hospital course complicated by periods of severe ELVI and hyponatremia, which resolved but stepped back up for to SICU on (9/10) for acute AMS, intermittent hypoglycemia, AFib with RVR. Percutaneous Cholecystostomy placed on (9/11) for enlarged GB, PCT capped 10/5 and uncapped 10/25 for hyperbilirubinemia, Right brachial DVT, left basillic and cephalic superficial thrombus on duplex 11/2, CT 11/14 with ALDEN ground glass opacity of unclear etiology, completed empiric 7day cefepime course 11/22, rising T-bili of unclear etilogy, now w resolved candida glabrata fungemia, ELVI    NEURO: AMS resolved, likely septic encephalopathy, EEG neg. Hx of depression - cont Effexor. Nausea: Zofran PRN. Anxiety: Lorazepam PRN.  CV: Hx Afib/flutter: s/p DCCV, Amio stopped due to persistent transaminitis. Continue labetalol IV 10q6h  with hold parameters. Hx HLD: Lipitor held given high LFTs. TTE  (7/18) - PASP 64mmHg, EF 65-70%, Hx HTN - stopped PO Norvasc as unlikely absorbing, holding Losartan. BP borderline - continue hydralazine PRN.   PULM: Atelectasis/dyspnea improved clinically: lower to 1 hr of BiPAP every 12hrs of nasal canula or room air. Encourage OOB and IS.  CT from 11/14 with ALDEN ground glass opacity of unclear etiology. COVID negative. RVP negative. completed 7d empiric cefepime 11/22 to cover for potential PNA.   GI/FEN: Low res, low fat, high protein diet. Cont Ensure max x1/day. Folate, thiamine. fungemia likely CLABSI. PICC replaced on 12/4 and continue TPN qd, lipids qd.  High output EC fistula: cont Octreotide & Cholestyramine 10/18. Transaminitis elevated T bili of unclear origin, s/p Perc Deb on 9/11; MRCP 10/30 no obstruction, seen by Hepatology started on ursodiol, RUQ US 11/25 CBD 5mm, hepatic vessels patent Repeat. MRCP neg. Monitor drainage from fistula, bolus as needed to keep I&O even.    : Voids. ELVI, Cr trending down. stopped famotidine and half Effexor dose given renal dysfunction. Caspo unlikely etiology of ELVI per ID. s/p brief bicarb gtt.  MARLO Duplex and RP US unremarkable, CK wnl.  f/u renal recs pending glomerulonephritis labs.  ENDO: Hx hypothyroid: IV Synthroid, TSH low on 11/30, decreased synthroid dose, repeat TSH 12/6 wnl.  ID: BCx 11/22 grew candida glabrata, likely CLABSI. ID consulted, s/p Caspo, switched to Fluconazole (12/1-12/9). Repeat surveillance blood cx NGTD. Ophthalmology evaulated - normal ocular exam. Echo no vegetation. PICC replaced on 12/4.  Recent MRSA swab negative, RVP panel negative, COVID negative. Cont Nystatin powder // PREVIOUSLY  caspofungin (11/24-12/1). completed empiric 7days cefepime (11/15-11/22), had C. tertium, Lactobacillis from his IR cx 7/3, and candida albicans, lactobacillus, vanc sensitive E faecium, vanc resistant E gallinarum, vanc resistant E casseliflavus, lactobacillus from his OR cx 7/5. Completed course of abx with Imipenem (9/10-9/15). Imipenem (8/26--) imipenem (6/30-7/12, 7/23-7/30), Dapto (6/30-7/5 and 7/23-7/24). Empiric dapto (8/23-24) and cefepime (8/23-24).   PPX: SCDs, SQH, was on Therapeutic lovenox bid for R brachial vein DVT, but will hold off on hep gtt since repeat US Duplex negative.  HEME: Anemia - continue Epogen weekly. S/p Iron Sucrose 200 qd x 3 days for chronic anemia.  LINES: ZAHEER Pinto PICC (12/4 - ) will plan to switch PICC once monthly // DC: LUE PICC (9/1-11/1 ), RUE PICC: (11/1-11/24)   WOUNDS/DRAINS: Abdominal wound and fistula with wound manager in place dressing change Tuesday and Friday. RLQ Ostomy. IR perc deb tube. // DC: L Clay drain.  PT: Ambulate as tolerated OOB to chair daily.  DISPO: SICU due to complicated hospital course.     78M w PMH Crohn's, AFib/Flutter s/p DCCVs on amiodarone, remote ileocectomy and open appendectomy. Admitted (6/23) for SBO vs Crohns flare, s/p NGT decompression and s/p lap converted to open TRE, SBR x 3, left in discontinuity with abthera vac on (6/27), RTOR for ileocolic resection, small bowel anastomosis, and abdominal wall closure on (6/28), c/b fluid collection s/p IR aspiration of perihepatic fluid on (7/3), c/b wound dehiscence s/p RTOR exlap, washout, ileocolic resection with end ileostomy, blow hole colostomy, red rubber from ileostomy to small bowel anastomosis; vicryl bridging mesh on (7/5) transferred to SICU postoperatively for hemodynamic monitoring, with hospital course complicated by periods of severe ELVI and hyponatremia, which resolved but stepped back up for to SICU on (9/10) for acute AMS, intermittent hypoglycemia, AFib with RVR. Percutaneous Cholecystostomy placed on (9/11) for enlarged GB, PCT capped 10/5 and uncapped 10/25 for hyperbilirubinemia, Right brachial DVT, left basillic and cephalic superficial thrombus on duplex 11/2, CT 11/14 with ALDEN ground glass opacity of unclear etiology, completed empiric 7day cefepime course 11/22, rising T-bili of unclear etilogy, now w resolved candida glabrata fungemia, ELVI    NEURO: AMS resolved, likely septic encephalopathy, EEG neg. Hx of depression - cont Effexor. Nausea: Zofran PRN. Anxiety: Lorazepam PRN.  CV: Hx Afib/flutter: s/p DCCV, Amio stopped due to persistent transaminitis. Continue labetalol IV 10q6h  with hold parameters. Hx HLD: Lipitor held given high LFTs. TTE  (7/18) - PASP 64mmHg, EF 65-70%, Hx HTN - stopped PO Norvasc as unlikely absorbing, holding Losartan. BP borderline - continue hydralazine PRN.   PULM: Atelectasis/dyspnea improved clinically: lower to 1 hr of BiPAP every 12hrs of nasal canula or room air. Encourage OOB and IS.  CT from 11/14 with ALDEN ground glass opacity of unclear etiology. COVID negative. RVP negative. completed 7d empiric cefepime 11/22 to cover for potential PNA.   GI/FEN: Low res, low fat, high protein diet. Cont Ensure max x1/day. Folate, thiamine. fungemia likely CLABSI. PICC replaced on 12/4 and continue TPN qd, lipids qd.  High output EC fistula: cont Octreotide & Cholestyramine 10/18. Transaminitis elevated T bili of unclear origin, s/p Perc Deb on 9/11; MRCP 10/30 no obstruction, seen by Hepatology started on ursodiol, RUQ US 11/25 CBD 5mm, hepatic vessels patent Repeat. MRCP neg. Monitor drainage from fistula, bolus as needed to keep I&O even.    : Voids. ELVI, Cr trending down. stopped famotidine and half Effexor dose given renal dysfunction. Caspo unlikely etiology of ELVI per ID. s/p brief bicarb gtt.  MARLO Duplex and RP US unremarkable, CK wnl.  f/u renal recs pending glomerulonephritis labs.  ENDO: Hx hypothyroid: IV Synthroid, TSH low on 11/30, decreased synthroid dose, repeat TSH 12/6 wnl.  ID: BCx 11/22 grew candida glabrata, likely CLABSI. ID consulted, s/p Caspo, switched to Fluconazole (12/1-12/9). Repeat surveillance blood cx NGTD. Ophthalmology evaulated - normal ocular exam. Echo no vegetation. PICC replaced on 12/4.  Recent MRSA swab negative, RVP panel negative, COVID negative. Cont Nystatin powder // PREVIOUSLY  caspofungin (11/24-12/1). completed empiric 7days cefepime (11/15-11/22), had C. tertium, Lactobacillis from his IR cx 7/3, and candida albicans, lactobacillus, vanc sensitive E faecium, vanc resistant E gallinarum, vanc resistant E casseliflavus, lactobacillus from his OR cx 7/5. Completed course of abx with Imipenem (9/10-9/15). Imipenem (8/26--) imipenem (6/30-7/12, 7/23-7/30), Dapto (6/30-7/5 and 7/23-7/24). Empiric dapto (8/23-24) and cefepime (8/23-24).   PPX: SCDs, SQH, was on Therapeutic lovenox bid for R brachial vein DVT, but will hold off on hep gtt since repeat US Duplex negative.  HEME: Anemia - continue Epogen weekly. S/p Iron Sucrose 200 qd x 3 days for chronic anemia.  LINES: ZAHEER Pinto PICC (12/4 - ) will plan to switch PICC once a month// DC: LUE PICC (9/1-11/1 ), RUE PICC: (11/1-11/24)   WOUNDS/DRAINS: Abdominal wound and fistula with wound manager in place dressing change Tuesday and Friday. RLQ Ostomy. IR perc deb tube. // DC: L Clay drain.  PT: Ambulate as tolerated OOB to chair daily.  DISPO: SICU due to complicated hospital course.

## 2023-12-18 NOTE — PROGRESS NOTE ADULT - SUBJECTIVE AND OBJECTIVE BOX
S: No new issues/events overnight, no new med c/o    O: ICU Vital Signs Last 24 Hrs  T(F): 97.9 (12-18-23 @ 10:00), Max: 98.5 (12-17-23 @ 13:03)  HR: 100 (12-18-23 @ 11:00) (93 - 101)  BP: 149/70 (12-18-23 @ 11:00) (123/87 - 177/78)  BP(mean): 100 (12-18-23 @ 11:00) (83 - 112)  ABP: --  RR: 20 (12-18-23 @ 11:00) (18 - 25)  SpO2: 95% (12-18-23 @ 11:00) (93% - 100%)    PHYSICAL EXAM:   Neurological: AAOx3, CNII-XII intact,  strength 5/5 b/l  ENT: mucus membrane moist  Cardiovascular: RRR  Respiratory: CTA  Gastrointestinal: soft, NT, ND, BS+, fistula thickish yellowish formed output, has punctate bleeding from wound bed. perc cony bilious,  Extremities: warm, no dependent edema  Vascular: no cyanosis/erythema  Skin: no rashes  MSK: no joint swelling.      LABS:    12-18    133<L>  |  100  |  38<H>  ----------------------------<  144<H>  4.0   |  26  |  1.58<H>    Ca    8.2<L>      18 Dec 2023 05:30  Phos  3.1     12-18  Mg     2.1     12-18    TPro  8.6<H>  /  Alb  2.5<L>  /  TBili  7.7<H>  /  DBili  5.2<H>  /  AST  114<H>  /  ALT  77<H>  /  AlkPhos  140<H>  12-18  LIVER FUNCTIONS - ( 18 Dec 2023 05:30 )  Alb: 2.5 g/dL / Pro: 8.6 g/dL / ALK PHOS: 140 U/L / ALT: 77 U/L / AST: 114 U/L / GGT: x           Urinalysis Basic - ( 18 Dec 2023 05:30 )    Color: x / Appearance: x / SG: x / pH: x  Gluc: 144 mg/dL / Ketone: x  / Bili: x / Urobili: x   Blood: x / Protein: x / Nitrite: x   Leuk Esterase: x / RBC: x / WBC x   Sq Epi: x / Non Sq Epi: x / Bacteria: x    CAPILLARY BLOOD GLUCOSE        MEDICATIONS  (STANDING):  chlorhexidine 2% Cloths 1 Application(s) Topical <User Schedule>  cholestyramine Powder (Sugar-Free) 4 Gram(s) Oral daily  epoetin matt-epbx (RETACRIT) Injectable 27761 Unit(s) SubCutaneous every 7 days  ergocalciferol 42147 Unit(s) Oral <User Schedule>  glucagon  Injectable 1 milliGRAM(s) IntraMuscular once  heparin   Injectable 5000 Unit(s) SubCutaneous every 8 hours  labetalol Injectable 10 milliGRAM(s) IV Push every 6 hours  levothyroxine Injectable 30 MICROGram(s) IV Push at bedtime  lipid, fat emulsion (Fish Oil and Plant Based) 20% Infusion 0.54 Gm/kG/Day (20.83 mL/Hr) IV Continuous <Continuous>  Parenteral Nutrition - Adult 1 Each TPN Continuous <Continuous>  Parenteral Nutrition - Adult 1 Each TPN Continuous <Continuous>  silver nitrate Applicator 1 Application(s) Topical once  ursodiol Suspension 300 milliGRAM(s) Oral every 8 hours  venlafaxine XR. 150 milliGRAM(s) Oral daily    MEDICATIONS  (PRN):  hydrALAZINE Injectable 10 milliGRAM(s) IV Push every 6 hours PRN SBP >170  lidocaine 2% Viscous 10 milliLiter(s) Swish and Spit every 12 hours PRN tongue pain  LORazepam   Injectable 0.5 milliGRAM(s) IV Push at bedtime PRN Anxiety  ondansetron Injectable 4 milliGRAM(s) IV Push every 6 hours PRN Nausea and/or Vomiting      Poole:	  [ x] None	[ ] Daily Poole Order Placed	   Indication:	  [ ] Strict I and O's    [ ] Obstruction     [ ] Incontinence + Stage 3 or 4 Decubitus  Central Line:  [ ] None	   [x ]  Medication / TPN Administration     [ ] No Peripheral IV        S: No new issues/events overnight, no new med c/o    O: ICU Vital Signs Last 24 Hrs  T(F): 97.9 (12-18-23 @ 10:00), Max: 98.5 (12-17-23 @ 13:03)  HR: 100 (12-18-23 @ 11:00) (93 - 101)  BP: 149/70 (12-18-23 @ 11:00) (123/87 - 177/78)  BP(mean): 100 (12-18-23 @ 11:00) (83 - 112)  ABP: --  RR: 20 (12-18-23 @ 11:00) (18 - 25)  SpO2: 95% (12-18-23 @ 11:00) (93% - 100%)    PHYSICAL EXAM:   Neurological: AAOx3, CNII-XII intact,  strength 5/5 b/l  ENT: mucus membrane moist  Cardiovascular: RRR  Respiratory: CTA  Gastrointestinal: soft, NT, ND, BS+, fistula thickish yellowish formed output, has punctate bleeding from wound bed. perc cony bilious,  Extremities: warm, no dependent edema  Vascular: no cyanosis/erythema  Skin: no rashes  MSK: no joint swelling.      LABS:    12-18    133<L>  |  100  |  38<H>  ----------------------------<  144<H>  4.0   |  26  |  1.58<H>    Ca    8.2<L>      18 Dec 2023 05:30  Phos  3.1     12-18  Mg     2.1     12-18    TPro  8.6<H>  /  Alb  2.5<L>  /  TBili  7.7<H>  /  DBili  5.2<H>  /  AST  114<H>  /  ALT  77<H>  /  AlkPhos  140<H>  12-18  LIVER FUNCTIONS - ( 18 Dec 2023 05:30 )  Alb: 2.5 g/dL / Pro: 8.6 g/dL / ALK PHOS: 140 U/L / ALT: 77 U/L / AST: 114 U/L / GGT: x           Urinalysis Basic - ( 18 Dec 2023 05:30 )    Color: x / Appearance: x / SG: x / pH: x  Gluc: 144 mg/dL / Ketone: x  / Bili: x / Urobili: x   Blood: x / Protein: x / Nitrite: x   Leuk Esterase: x / RBC: x / WBC x   Sq Epi: x / Non Sq Epi: x / Bacteria: x    CAPILLARY BLOOD GLUCOSE        MEDICATIONS  (STANDING):  chlorhexidine 2% Cloths 1 Application(s) Topical <User Schedule>  cholestyramine Powder (Sugar-Free) 4 Gram(s) Oral daily  epoetin matt-epbx (RETACRIT) Injectable 41357 Unit(s) SubCutaneous every 7 days  ergocalciferol 08566 Unit(s) Oral <User Schedule>  glucagon  Injectable 1 milliGRAM(s) IntraMuscular once  heparin   Injectable 5000 Unit(s) SubCutaneous every 8 hours  labetalol Injectable 10 milliGRAM(s) IV Push every 6 hours  levothyroxine Injectable 30 MICROGram(s) IV Push at bedtime  lipid, fat emulsion (Fish Oil and Plant Based) 20% Infusion 0.54 Gm/kG/Day (20.83 mL/Hr) IV Continuous <Continuous>  Parenteral Nutrition - Adult 1 Each TPN Continuous <Continuous>  Parenteral Nutrition - Adult 1 Each TPN Continuous <Continuous>  silver nitrate Applicator 1 Application(s) Topical once  ursodiol Suspension 300 milliGRAM(s) Oral every 8 hours  venlafaxine XR. 150 milliGRAM(s) Oral daily    MEDICATIONS  (PRN):  hydrALAZINE Injectable 10 milliGRAM(s) IV Push every 6 hours PRN SBP >170  lidocaine 2% Viscous 10 milliLiter(s) Swish and Spit every 12 hours PRN tongue pain  LORazepam   Injectable 0.5 milliGRAM(s) IV Push at bedtime PRN Anxiety  ondansetron Injectable 4 milliGRAM(s) IV Push every 6 hours PRN Nausea and/or Vomiting      Poole:	  [ x] None	[ ] Daily Poole Order Placed	   Indication:	  [ ] Strict I and O's    [ ] Obstruction     [ ] Incontinence + Stage 3 or 4 Decubitus  Central Line:  [ ] None	   [x ]  Medication / TPN Administration     [ ] No Peripheral IV        S: No new issues/events overnight, no new med c/o, no SOB or CP    O: ICU Vital Signs Last 24 Hrs  T(F): 97.9 (12-18-23 @ 10:00), Max: 98.5 (12-17-23 @ 13:03)  HR: 100 (12-18-23 @ 11:00) (93 - 101)  BP: 149/70 (12-18-23 @ 11:00) (123/87 - 177/78)  BP(mean): 100 (12-18-23 @ 11:00) (83 - 112)  ABP: --  RR: 20 (12-18-23 @ 11:00) (18 - 25)  SpO2: 95% (12-18-23 @ 11:00) (93% - 100%)    PHYSICAL EXAM:   Neurological: AAOx3, CNII-XII intact,  strength 5/5 b/l  ENT: mucus membrane moist  Cardiovascular: RRR  Respiratory: CTA  Gastrointestinal: soft, NT, ND, BS+, fistula thickish yellowish formed output, has punctate bleeding from wound bed. perc cony bilious,  Extremities: warm, no dependent edema  Vascular: no cyanosis/erythema  Skin: no rashes  MSK: no joint swelling.      LABS:    12-18    133<L>  |  100  |  38<H>  ----------------------------<  144<H>  4.0   |  26  |  1.58<H>    Ca    8.2<L>      18 Dec 2023 05:30  Phos  3.1     12-18  Mg     2.1     12-18    TPro  8.6<H>  /  Alb  2.5<L>  /  TBili  7.7<H>  /  DBili  5.2<H>  /  AST  114<H>  /  ALT  77<H>  /  AlkPhos  140<H>  12-18  LIVER FUNCTIONS - ( 18 Dec 2023 05:30 )  Alb: 2.5 g/dL / Pro: 8.6 g/dL / ALK PHOS: 140 U/L / ALT: 77 U/L / AST: 114 U/L / GGT: x           Urinalysis Basic - ( 18 Dec 2023 05:30 )    Color: x / Appearance: x / SG: x / pH: x  Gluc: 144 mg/dL / Ketone: x  / Bili: x / Urobili: x   Blood: x / Protein: x / Nitrite: x   Leuk Esterase: x / RBC: x / WBC x   Sq Epi: x / Non Sq Epi: x / Bacteria: x    CAPILLARY BLOOD GLUCOSE        MEDICATIONS  (STANDING):  chlorhexidine 2% Cloths 1 Application(s) Topical <User Schedule>  cholestyramine Powder (Sugar-Free) 4 Gram(s) Oral daily  epoetin matt-epbx (RETACRIT) Injectable 85775 Unit(s) SubCutaneous every 7 days  ergocalciferol 38054 Unit(s) Oral <User Schedule>  glucagon  Injectable 1 milliGRAM(s) IntraMuscular once  heparin   Injectable 5000 Unit(s) SubCutaneous every 8 hours  labetalol Injectable 10 milliGRAM(s) IV Push every 6 hours  levothyroxine Injectable 30 MICROGram(s) IV Push at bedtime  lipid, fat emulsion (Fish Oil and Plant Based) 20% Infusion 0.54 Gm/kG/Day (20.83 mL/Hr) IV Continuous <Continuous>  Parenteral Nutrition - Adult 1 Each TPN Continuous <Continuous>  Parenteral Nutrition - Adult 1 Each TPN Continuous <Continuous>  silver nitrate Applicator 1 Application(s) Topical once  ursodiol Suspension 300 milliGRAM(s) Oral every 8 hours  venlafaxine XR. 150 milliGRAM(s) Oral daily    MEDICATIONS  (PRN):  hydrALAZINE Injectable 10 milliGRAM(s) IV Push every 6 hours PRN SBP >170  lidocaine 2% Viscous 10 milliLiter(s) Swish and Spit every 12 hours PRN tongue pain  LORazepam   Injectable 0.5 milliGRAM(s) IV Push at bedtime PRN Anxiety  ondansetron Injectable 4 milliGRAM(s) IV Push every 6 hours PRN Nausea and/or Vomiting      Poole:	  [ x] None	[ ] Daily Poole Order Placed	   Indication:	  [ ] Strict I and O's    [ ] Obstruction     [ ] Incontinence + Stage 3 or 4 Decubitus  Central Line:  [ ] None	   [x ]  Medication / TPN Administration     [ ] No Peripheral IV        S: No new issues/events overnight, no new med c/o, no SOB or CP    O: ICU Vital Signs Last 24 Hrs  T(F): 97.9 (12-18-23 @ 10:00), Max: 98.5 (12-17-23 @ 13:03)  HR: 100 (12-18-23 @ 11:00) (93 - 101)  BP: 149/70 (12-18-23 @ 11:00) (123/87 - 177/78)  BP(mean): 100 (12-18-23 @ 11:00) (83 - 112)  ABP: --  RR: 20 (12-18-23 @ 11:00) (18 - 25)  SpO2: 95% (12-18-23 @ 11:00) (93% - 100%)    PHYSICAL EXAM:   Neurological: AAOx3, CNII-XII intact,  strength 5/5 b/l  ENT: mucus membrane moist  Cardiovascular: RRR  Respiratory: CTA  Gastrointestinal: soft, NT, ND, BS+, fistula thickish yellowish formed output, has punctate bleeding from wound bed. perc cony bilious,  Extremities: warm, no dependent edema  Vascular: no cyanosis/erythema  Skin: no rashes  MSK: no joint swelling.      LABS:    12-18    133<L>  |  100  |  38<H>  ----------------------------<  144<H>  4.0   |  26  |  1.58<H>    Ca    8.2<L>      18 Dec 2023 05:30  Phos  3.1     12-18  Mg     2.1     12-18    TPro  8.6<H>  /  Alb  2.5<L>  /  TBili  7.7<H>  /  DBili  5.2<H>  /  AST  114<H>  /  ALT  77<H>  /  AlkPhos  140<H>  12-18  LIVER FUNCTIONS - ( 18 Dec 2023 05:30 )  Alb: 2.5 g/dL / Pro: 8.6 g/dL / ALK PHOS: 140 U/L / ALT: 77 U/L / AST: 114 U/L / GGT: x           Urinalysis Basic - ( 18 Dec 2023 05:30 )    Color: x / Appearance: x / SG: x / pH: x  Gluc: 144 mg/dL / Ketone: x  / Bili: x / Urobili: x   Blood: x / Protein: x / Nitrite: x   Leuk Esterase: x / RBC: x / WBC x   Sq Epi: x / Non Sq Epi: x / Bacteria: x    CAPILLARY BLOOD GLUCOSE        MEDICATIONS  (STANDING):  chlorhexidine 2% Cloths 1 Application(s) Topical <User Schedule>  cholestyramine Powder (Sugar-Free) 4 Gram(s) Oral daily  epoetin matt-epbx (RETACRIT) Injectable 73939 Unit(s) SubCutaneous every 7 days  ergocalciferol 66458 Unit(s) Oral <User Schedule>  glucagon  Injectable 1 milliGRAM(s) IntraMuscular once  heparin   Injectable 5000 Unit(s) SubCutaneous every 8 hours  labetalol Injectable 10 milliGRAM(s) IV Push every 6 hours  levothyroxine Injectable 30 MICROGram(s) IV Push at bedtime  lipid, fat emulsion (Fish Oil and Plant Based) 20% Infusion 0.54 Gm/kG/Day (20.83 mL/Hr) IV Continuous <Continuous>  Parenteral Nutrition - Adult 1 Each TPN Continuous <Continuous>  Parenteral Nutrition - Adult 1 Each TPN Continuous <Continuous>  silver nitrate Applicator 1 Application(s) Topical once  ursodiol Suspension 300 milliGRAM(s) Oral every 8 hours  venlafaxine XR. 150 milliGRAM(s) Oral daily    MEDICATIONS  (PRN):  hydrALAZINE Injectable 10 milliGRAM(s) IV Push every 6 hours PRN SBP >170  lidocaine 2% Viscous 10 milliLiter(s) Swish and Spit every 12 hours PRN tongue pain  LORazepam   Injectable 0.5 milliGRAM(s) IV Push at bedtime PRN Anxiety  ondansetron Injectable 4 milliGRAM(s) IV Push every 6 hours PRN Nausea and/or Vomiting      Poole:	  [ x] None	[ ] Daily Poole Order Placed	   Indication:	  [ ] Strict I and O's    [ ] Obstruction     [ ] Incontinence + Stage 3 or 4 Decubitus  Central Line:  [ ] None	   [x ]  Medication / TPN Administration     [ ] No Peripheral IV

## 2023-12-18 NOTE — PROGRESS NOTE ADULT - NS ATTEND AMEND GEN_ALL_CORE FT
AF, UC, s/p SBR, ileocolic resection, ileostomy, multiple infections, enteroatmospheric fistula  physical as above  continue TPN  continue labetalol for HTN and AF  sq heparin  decrease BIPAP to one hour q 12h

## 2023-12-19 NOTE — PROGRESS NOTE ADULT - SUBJECTIVE AND OBJECTIVE BOX
SUBJECTIVE: Patient seen and examined bedside by chief resident. TAN. Endorses good pain control with medication. Denies nausea/vomiting tolerating diet. Endorses gas and bowel movements into conrado bag. No F/C.     heparin   Injectable 5000 Unit(s) SubCutaneous every 8 hours  hydrALAZINE Injectable 10 milliGRAM(s) IV Push every 6 hours PRN  labetalol Injectable 10 milliGRAM(s) IV Push every 6 hours    MEDICATIONS  (PRN):  hydrALAZINE Injectable 10 milliGRAM(s) IV Push every 6 hours PRN SBP >170  lidocaine 2% Viscous 10 milliLiter(s) Swish and Spit every 12 hours PRN tongue pain  ondansetron Injectable 4 milliGRAM(s) IV Push every 6 hours PRN Nausea and/or Vomiting      I&O's Detail    18 Dec 2023 07:01  -  19 Dec 2023 07:00  --------------------------------------------------------  IN:    Fat Emulsion (Fish Oil &amp; Plant Based) 20% Infusion: 291.2 mL    Oral Fluid: 840 mL    Sodium Chloride 0.9% Bolus: 1000 mL    TPN (Total Parenteral Nutrition): 2063 mL  Total IN: 4194.2 mL    OUT:    Drain (mL): 775 mL    Drain (mL): 1275 mL    Drain (mL): 20 mL    Voided (mL): 1780 mL  Total OUT: 3850 mL    Total NET: 344.2 mL      19 Dec 2023 07:01  -  19 Dec 2023 08:17  --------------------------------------------------------  IN:    TPN (Total Parenteral Nutrition): 121 mL  Total IN: 121 mL    OUT:    Drain (mL): 50 mL    Fat Emulsion (Fish Oil &amp; Plant Based) 20% Infusion: 0 mL    Voided (mL): 200 mL  Total OUT: 250 mL    Total NET: -129 mL          Vital Signs Last 24 Hrs  T(C): 36.6 (19 Dec 2023 05:20), Max: 36.7 (18 Dec 2023 17:50)  T(F): 97.8 (19 Dec 2023 05:20), Max: 98.1 (18 Dec 2023 17:50)  HR: 82 (19 Dec 2023 08:00) (81 - 100)  BP: 134/64 (19 Dec 2023 08:00) (123/58 - 189/93)  BP(mean): 92 (19 Dec 2023 08:00) (83 - 131)  RR: 20 (19 Dec 2023 07:00) (18 - 29)  SpO2: 97% (19 Dec 2023 08:00) (93% - 100%)    Parameters below as of 19 Dec 2023 08:00  Patient On (Oxygen Delivery Method): room air    Physical Exam:  General: NAD, resting comfortably in bed  Pulmonary: Nonlabored breathing, no respiratory distress  Cardiovascular: NSR  Abdominal: soft, NT/ND, Eakins bag output thick, ileostomy with red rubber in place minimal output, perc cony tube in place with bilious output  Extremities: WWP, normal strength  Neuro: A/O x 3, CNs II-XII grossly intact, no focal deficits    LABS:    12-18    133<L>  |  100  |  38<H>  ----------------------------<  144<H>  4.0   |  26  |  1.58<H>    Ca    8.2<L>      18 Dec 2023 05:30  Phos  3.1     12-18  Mg     2.1     12-18    TPro  8.6<H>  /  Alb  2.5<L>  /  TBili  7.7<H>  /  DBili  5.2<H>  /  AST  114<H>  /  ALT  77<H>  /  AlkPhos  140<H>  12-18      Urinalysis Basic - ( 18 Dec 2023 05:30 )    Color: x / Appearance: x / SG: x / pH: x  Gluc: 144 mg/dL / Ketone: x  / Bili: x / Urobili: x   Blood: x / Protein: x / Nitrite: x   Leuk Esterase: x / RBC: x / WBC x   Sq Epi: x / Non Sq Epi: x / Bacteria: x        RADIOLOGY & ADDITIONAL STUDIES:

## 2023-12-19 NOTE — PROGRESS NOTE ADULT - ASSESSMENT
78M w PMH Crohn's, AFib/Flutter s/p DCCVs on amiodarone, remote ileocectomy and open appendectomy. Admitted (6/23) for SBO vs Crohns flare, s/p NGT decompression and s/p lap converted to open TRE, SBR x 3, left in discontinuity with abthera vac on (6/27), RTOR for ileocolic resection, small bowel anastomosis, and abdominal wall closure on (6/28), c/b fluid collection s/p IR aspiration of perihepatic fluid on (7/3), c/b wound dehiscence s/p RTOR exlap, washout, ileocolic resection with end ileostomy, blow hole colostomy, red rubber from ileostomy to small bowel anastomosis; vicryl bridging mesh on (7/5) transferred to SICU postoperatively for hemodynamic monitoring, with hospital course complicated by periods of severe ELVI and hyponatremia, which resolved but stepped back up for to SICU on (9/10) for acute AMS, intermittent hypoglycemia, AFib with RVR. Percutaneous Cholecystostomy placed on (9/11) for enlarged GB, PCT capped 10/5 and uncapped 10/25 for hyperbilirubinemia, Right brachial DVT, left basillic and cephalic superficial thrombus on duplex 11/2, CT 11/14 with ALDEN ground glass opacity of unclear etiology, completed empiric 7day cefepime course 11/22, rising T-bili of unclear etilogy, now w resolved candida glabrata fungemia, ELVI resolving.    Trend Cr, f/u Nephrology w/ addl recs   cw diet as tolerated   f/u hepatology recommendations   Wound care as per ostomy nurses   Continue to watch hydration status and repleting/restricting  continue with PT   rest of care per SICU, appreciate excellent care by SICU team

## 2023-12-19 NOTE — PROGRESS NOTE ADULT - SUBJECTIVE AND OBJECTIVE BOX
SUBJECTIVE:   This AM, doing well. Energy is good and ready for a walk. Nurses noted his Ostomy to be dark red, surgicel placed over wound. Tolerating diet, enjoying being able to eat. Otherwise no N/V or abd pain. No SOB/CP. Voids without issues.    MEDICATIONS  (STANDING):  chlorhexidine 2% Cloths 1 Application(s) Topical <User Schedule>  cholestyramine Powder (Sugar-Free) 4 Gram(s) Oral daily  epoetin matt-epbx (RETACRIT) Injectable 38577 Unit(s) SubCutaneous every 7 days  ergocalciferol 95617 Unit(s) Oral <User Schedule>  glucagon  Injectable 1 milliGRAM(s) IntraMuscular once  heparin   Injectable 5000 Unit(s) SubCutaneous every 8 hours  labetalol Injectable 10 milliGRAM(s) IV Push every 6 hours  levothyroxine Injectable 30 MICROGram(s) IV Push at bedtime  lipid, fat emulsion (Fish Oil and Plant Based) 20% Infusion 0.54 Gm/kG/Day (20.83 mL/Hr) IV Continuous <Continuous>  Parenteral Nutrition - Adult 1 Each TPN Continuous <Continuous>  Parenteral Nutrition - Adult 1 Each TPN Continuous <Continuous>  ursodiol Suspension 300 milliGRAM(s) Oral every 8 hours  venlafaxine XR. 150 milliGRAM(s) Oral daily    MEDICATIONS  (PRN):  hydrALAZINE Injectable 10 milliGRAM(s) IV Push every 6 hours PRN SBP >170  lidocaine 2% Viscous 10 milliLiter(s) Swish and Spit every 12 hours PRN tongue pain  LORazepam   Injectable 0.5 milliGRAM(s) IV Push at bedtime PRN Anxiety  ondansetron Injectable 4 milliGRAM(s) IV Push every 6 hours PRN Nausea and/or Vomiting      Vital Signs Last 24 Hrs  T(C): 36.7 (19 Dec 2023 09:00), Max: 36.7 (18 Dec 2023 17:50)  T(F): 98.1 (19 Dec 2023 09:00), Max: 98.1 (18 Dec 2023 17:50)  HR: 75 (19 Dec 2023 12:00) (75 - 100)  BP: 109/58 (19 Dec 2023 12:00) (109/58 - 189/93)  BP(mean): 80 (19 Dec 2023 12:00) (80 - 131)  RR: 13 (19 Dec 2023 12:00) (12 - 29)  SpO2: 97% (19 Dec 2023 12:00) (93% - 100%)    Parameters below as of 19 Dec 2023 12:00  Patient On (Oxygen Delivery Method): room air    Physical Exam:  Neurological: AAOx3, CNII-XII intact,  strength 5/5 b/l  ENT: mucus membrane moist  Cardiovascular: RRR  Respiratory: CTA  Gastrointestinal: soft, NT, ND, BS+, fistula thickish yellowish formed output, has punctate bleeding from inferior aspect of stoma on granular tissue bed--surgicell placed and bleeding controlled. perc cony bilious,  Extremities: warm, no dependent edema  Vascular: no cyanosis/erythema  Skin: no rashes  MSK: no joint swelling.    I&O's Summary    18 Dec 2023 07:01  -  19 Dec 2023 07:00  --------------------------------------------------------  IN: 4194.2 mL / OUT: 3850 mL / NET: 344.2 mL    19 Dec 2023 07:01  -  19 Dec 2023 14:49  --------------------------------------------------------  IN: 121 mL / OUT: 750 mL / NET: -629 mL        LABS:    12-18    133<L>  |  100  |  38<H>  ----------------------------<  144<H>  4.0   |  26  |  1.58<H>    Ca    8.2<L>      18 Dec 2023 05:30  Phos  3.1     12-18  Mg     2.1     12-18    TPro  8.6<H>  /  Alb  2.5<L>  /  TBili  7.7<H>  /  DBili  5.2<H>  /  AST  114<H>  /  ALT  77<H>  /  AlkPhos  140<H>  12-18      Urinalysis Basic - ( 18 Dec 2023 05:30 )    Color: x / Appearance: x / SG: x / pH: x  Gluc: 144 mg/dL / Ketone: x  / Bili: x / Urobili: x   Blood: x / Protein: x / Nitrite: x   Leuk Esterase: x / RBC: x / WBC x   Sq Epi: x / Non Sq Epi: x / Bacteria: x      CAPILLARY BLOOD GLUCOSE        LIVER FUNCTIONS - ( 18 Dec 2023 05:30 )  Alb: 2.5 g/dL / Pro: 8.6 g/dL / ALK PHOS: 140 U/L / ALT: 77 U/L / AST: 114 U/L / GGT: x             RADIOLOGY & ADDITIONAL STUDIES:   SUBJECTIVE:   This AM, doing well. Energy is good and ready for a walk. Nurses noted his Ostomy to be dark red, surgicel placed over wound. Tolerating diet, enjoying being able to eat. Otherwise no N/V or abd pain. No SOB/CP. Voids without issues.    MEDICATIONS  (STANDING):  chlorhexidine 2% Cloths 1 Application(s) Topical <User Schedule>  cholestyramine Powder (Sugar-Free) 4 Gram(s) Oral daily  epoetin matt-epbx (RETACRIT) Injectable 24015 Unit(s) SubCutaneous every 7 days  ergocalciferol 82710 Unit(s) Oral <User Schedule>  glucagon  Injectable 1 milliGRAM(s) IntraMuscular once  heparin   Injectable 5000 Unit(s) SubCutaneous every 8 hours  labetalol Injectable 10 milliGRAM(s) IV Push every 6 hours  levothyroxine Injectable 30 MICROGram(s) IV Push at bedtime  lipid, fat emulsion (Fish Oil and Plant Based) 20% Infusion 0.54 Gm/kG/Day (20.83 mL/Hr) IV Continuous <Continuous>  Parenteral Nutrition - Adult 1 Each TPN Continuous <Continuous>  Parenteral Nutrition - Adult 1 Each TPN Continuous <Continuous>  ursodiol Suspension 300 milliGRAM(s) Oral every 8 hours  venlafaxine XR. 150 milliGRAM(s) Oral daily    MEDICATIONS  (PRN):  hydrALAZINE Injectable 10 milliGRAM(s) IV Push every 6 hours PRN SBP >170  lidocaine 2% Viscous 10 milliLiter(s) Swish and Spit every 12 hours PRN tongue pain  LORazepam   Injectable 0.5 milliGRAM(s) IV Push at bedtime PRN Anxiety  ondansetron Injectable 4 milliGRAM(s) IV Push every 6 hours PRN Nausea and/or Vomiting      Vital Signs Last 24 Hrs  T(C): 36.7 (19 Dec 2023 09:00), Max: 36.7 (18 Dec 2023 17:50)  T(F): 98.1 (19 Dec 2023 09:00), Max: 98.1 (18 Dec 2023 17:50)  HR: 75 (19 Dec 2023 12:00) (75 - 100)  BP: 109/58 (19 Dec 2023 12:00) (109/58 - 189/93)  BP(mean): 80 (19 Dec 2023 12:00) (80 - 131)  RR: 13 (19 Dec 2023 12:00) (12 - 29)  SpO2: 97% (19 Dec 2023 12:00) (93% - 100%)    Parameters below as of 19 Dec 2023 12:00  Patient On (Oxygen Delivery Method): room air    Physical Exam:  Neurological: AAOx3, CNII-XII intact,  strength 5/5 b/l  ENT: mucus membrane moist  Cardiovascular: RRR  Respiratory: CTA  Gastrointestinal: soft, NT, ND, BS+, fistula thickish yellowish formed output, has punctate bleeding from inferior aspect of stoma on granular tissue bed--surgicell placed and bleeding controlled. perc cony bilious,  Extremities: warm, no dependent edema  Vascular: no cyanosis/erythema  Skin: no rashes  MSK: no joint swelling.    I&O's Summary    18 Dec 2023 07:01  -  19 Dec 2023 07:00  --------------------------------------------------------  IN: 4194.2 mL / OUT: 3850 mL / NET: 344.2 mL    19 Dec 2023 07:01  -  19 Dec 2023 14:49  --------------------------------------------------------  IN: 121 mL / OUT: 750 mL / NET: -629 mL        LABS:    12-18    133<L>  |  100  |  38<H>  ----------------------------<  144<H>  4.0   |  26  |  1.58<H>    Ca    8.2<L>      18 Dec 2023 05:30  Phos  3.1     12-18  Mg     2.1     12-18    TPro  8.6<H>  /  Alb  2.5<L>  /  TBili  7.7<H>  /  DBili  5.2<H>  /  AST  114<H>  /  ALT  77<H>  /  AlkPhos  140<H>  12-18      Urinalysis Basic - ( 18 Dec 2023 05:30 )    Color: x / Appearance: x / SG: x / pH: x  Gluc: 144 mg/dL / Ketone: x  / Bili: x / Urobili: x   Blood: x / Protein: x / Nitrite: x   Leuk Esterase: x / RBC: x / WBC x   Sq Epi: x / Non Sq Epi: x / Bacteria: x      CAPILLARY BLOOD GLUCOSE        LIVER FUNCTIONS - ( 18 Dec 2023 05:30 )  Alb: 2.5 g/dL / Pro: 8.6 g/dL / ALK PHOS: 140 U/L / ALT: 77 U/L / AST: 114 U/L / GGT: x             RADIOLOGY & ADDITIONAL STUDIES:

## 2023-12-19 NOTE — PROGRESS NOTE ADULT - SUBJECTIVE AND OBJECTIVE BOX
INTERVAL/OVERNIGHT EVENTS: NAEO.    SUBJECTIVE: This AM, doing well. Energy is good and ready for a walk. Nurses noted his Ostomy to be dark red. Otherwise no N/V or abd pain. No SOB/CP. Voids without issues.    Neurologic Medications  LORazepam   Injectable 0.5 milliGRAM(s) IV Push at bedtime PRN Anxiety  ondansetron Injectable 4 milliGRAM(s) IV Push every 6 hours PRN Nausea and/or Vomiting  venlafaxine XR. 150 milliGRAM(s) Oral daily    Cardiovascular Medications  hydrALAZINE Injectable 10 milliGRAM(s) IV Push every 6 hours PRN SBP >170  labetalol Injectable 10 milliGRAM(s) IV Push every 6 hours    Gastrointestinal Medications  ergocalciferol 95897 Unit(s) Oral <User Schedule>  lipid, fat emulsion (Fish Oil and Plant Based) 20% Infusion 0.54 Gm/kG/Day IV Continuous <Continuous>  Parenteral Nutrition - Adult 1 Each TPN Continuous <Continuous>  ursodiol Suspension 300 milliGRAM(s) Oral every 8 hours    Hematologic/Oncologic Medications  epoetin matt-epbx (RETACRIT) Injectable 93767 Unit(s) SubCutaneous every 7 days  heparin   Injectable 5000 Unit(s) SubCutaneous every 8 hours    Endocrine/Metabolic Medications  cholestyramine Powder (Sugar-Free) 4 Gram(s) Oral daily  glucagon  Injectable 1 milliGRAM(s) IntraMuscular once  levothyroxine Injectable 30 MICROGram(s) IV Push at bedtime    Topical/Other Medications  chlorhexidine 2% Cloths 1 Application(s) Topical <User Schedule>  lidocaine 2% Viscous 10 milliLiter(s) Swish and Spit every 12 hours PRN tongue pain    MEDICATIONS  (PRN):  hydrALAZINE Injectable 10 milliGRAM(s) IV Push every 6 hours PRN SBP >170  lidocaine 2% Viscous 10 milliLiter(s) Swish and Spit every 12 hours PRN tongue pain  LORazepam   Injectable 0.5 milliGRAM(s) IV Push at bedtime PRN Anxiety  ondansetron Injectable 4 milliGRAM(s) IV Push every 6 hours PRN Nausea and/or Vomiting    I&O's Detail    18 Dec 2023 07:01  -  19 Dec 2023 07:00  --------------------------------------------------------  IN:    Fat Emulsion (Fish Oil &amp; Plant Based) 20% Infusion: 291.2 mL    Oral Fluid: 840 mL    Sodium Chloride 0.9% Bolus: 1000 mL    TPN (Total Parenteral Nutrition): 2063 mL  Total IN: 4194.2 mL    OUT:    Drain (mL): 775 mL    Drain (mL): 1275 mL    Drain (mL): 20 mL    Voided (mL): 1780 mL  Total OUT: 3850 mL    Total NET: 344.2 mL    19 Dec 2023 07:01  -  19 Dec 2023 13:28  --------------------------------------------------------  IN:    TPN (Total Parenteral Nutrition): 121 mL  Total IN: 121 mL    OUT:    Drain (mL): 150 mL    Drain (mL): 200 mL    Fat Emulsion (Fish Oil &amp; Plant Based) 20% Infusion: 0 mL    Voided (mL): 400 mL  Total OUT: 750 mL    Total NET: -629 mL    Vital Signs Last 24 Hrs  T(C): 36.7 (19 Dec 2023 09:00), Max: 36.7 (18 Dec 2023 17:50)  T(F): 98.1 (19 Dec 2023 09:00), Max: 98.1 (18 Dec 2023 17:50)  HR: 75 (19 Dec 2023 12:00) (75 - 100)  BP: 109/58 (19 Dec 2023 12:00) (109/58 - 189/93)  BP(mean): 80 (19 Dec 2023 12:00) (80 - 131)  RR: 13 (19 Dec 2023 12:00) (12 - 29)  SpO2: 97% (19 Dec 2023 12:00) (93% - 100%)    Parameters below as of 19 Dec 2023 12:00  Patient On (Oxygen Delivery Method): room air    Neurological: AAOx3, CNII-XII intact,  strength 5/5 b/l  ENT: mucus membrane moist  Cardiovascular: RRR  Respiratory: CTA  Gastrointestinal: soft, NT, ND, BS+, fistula thickish yellowish formed output, has punctate bleeding from wound bed. perc cony bilious,  Extremities: warm, no dependent edema  Vascular: no cyanosis/erythema  Skin: no rashes  MSK: no joint swelling.    LABS:    12-18    133<L>  |  100  |  38<H>  ----------------------------<  144<H>  4.0   |  26  |  1.58<H>    Ca    8.2<L>      18 Dec 2023 05:30  Phos  3.1     12-18  Mg     2.1     12-18    TPro  8.6<H>  /  Alb  2.5<L>  /  TBili  7.7<H>  /  DBili  5.2<H>  /  AST  114<H>  /  ALT  77<H>  /  AlkPhos  140<H>  12-18      Urinalysis Basic - ( 18 Dec 2023 05:30 )    Color: x / Appearance: x / SG: x / pH: x  Gluc: 144 mg/dL / Ketone: x  / Bili: x / Urobili: x   Blood: x / Protein: x / Nitrite: x   Leuk Esterase: x / RBC: x / WBC x   Sq Epi: x / Non Sq Epi: x / Bacteria: x        RADIOLOGY & ADDITIONAL STUDIES:     INTERVAL/OVERNIGHT EVENTS: NAEO.    SUBJECTIVE: This AM, doing well. Energy is good and ready for a walk. Nurses noted his Ostomy to be dark red. Otherwise no N/V or abd pain. No SOB/CP. Voids without issues.    Neurologic Medications  LORazepam   Injectable 0.5 milliGRAM(s) IV Push at bedtime PRN Anxiety  ondansetron Injectable 4 milliGRAM(s) IV Push every 6 hours PRN Nausea and/or Vomiting  venlafaxine XR. 150 milliGRAM(s) Oral daily    Cardiovascular Medications  hydrALAZINE Injectable 10 milliGRAM(s) IV Push every 6 hours PRN SBP >170  labetalol Injectable 10 milliGRAM(s) IV Push every 6 hours    Gastrointestinal Medications  ergocalciferol 13293 Unit(s) Oral <User Schedule>  lipid, fat emulsion (Fish Oil and Plant Based) 20% Infusion 0.54 Gm/kG/Day IV Continuous <Continuous>  Parenteral Nutrition - Adult 1 Each TPN Continuous <Continuous>  ursodiol Suspension 300 milliGRAM(s) Oral every 8 hours    Hematologic/Oncologic Medications  epoetin matt-epbx (RETACRIT) Injectable 38263 Unit(s) SubCutaneous every 7 days  heparin   Injectable 5000 Unit(s) SubCutaneous every 8 hours    Endocrine/Metabolic Medications  cholestyramine Powder (Sugar-Free) 4 Gram(s) Oral daily  glucagon  Injectable 1 milliGRAM(s) IntraMuscular once  levothyroxine Injectable 30 MICROGram(s) IV Push at bedtime    Topical/Other Medications  chlorhexidine 2% Cloths 1 Application(s) Topical <User Schedule>  lidocaine 2% Viscous 10 milliLiter(s) Swish and Spit every 12 hours PRN tongue pain    MEDICATIONS  (PRN):  hydrALAZINE Injectable 10 milliGRAM(s) IV Push every 6 hours PRN SBP >170  lidocaine 2% Viscous 10 milliLiter(s) Swish and Spit every 12 hours PRN tongue pain  LORazepam   Injectable 0.5 milliGRAM(s) IV Push at bedtime PRN Anxiety  ondansetron Injectable 4 milliGRAM(s) IV Push every 6 hours PRN Nausea and/or Vomiting    I&O's Detail    18 Dec 2023 07:01  -  19 Dec 2023 07:00  --------------------------------------------------------  IN:    Fat Emulsion (Fish Oil &amp; Plant Based) 20% Infusion: 291.2 mL    Oral Fluid: 840 mL    Sodium Chloride 0.9% Bolus: 1000 mL    TPN (Total Parenteral Nutrition): 2063 mL  Total IN: 4194.2 mL    OUT:    Drain (mL): 775 mL    Drain (mL): 1275 mL    Drain (mL): 20 mL    Voided (mL): 1780 mL  Total OUT: 3850 mL    Total NET: 344.2 mL    19 Dec 2023 07:01  -  19 Dec 2023 13:28  --------------------------------------------------------  IN:    TPN (Total Parenteral Nutrition): 121 mL  Total IN: 121 mL    OUT:    Drain (mL): 150 mL    Drain (mL): 200 mL    Fat Emulsion (Fish Oil &amp; Plant Based) 20% Infusion: 0 mL    Voided (mL): 400 mL  Total OUT: 750 mL    Total NET: -629 mL    Vital Signs Last 24 Hrs  T(C): 36.7 (19 Dec 2023 09:00), Max: 36.7 (18 Dec 2023 17:50)  T(F): 98.1 (19 Dec 2023 09:00), Max: 98.1 (18 Dec 2023 17:50)  HR: 75 (19 Dec 2023 12:00) (75 - 100)  BP: 109/58 (19 Dec 2023 12:00) (109/58 - 189/93)  BP(mean): 80 (19 Dec 2023 12:00) (80 - 131)  RR: 13 (19 Dec 2023 12:00) (12 - 29)  SpO2: 97% (19 Dec 2023 12:00) (93% - 100%)    Parameters below as of 19 Dec 2023 12:00  Patient On (Oxygen Delivery Method): room air    Neurological: AAOx3, CNII-XII intact,  strength 5/5 b/l  ENT: mucus membrane moist  Cardiovascular: RRR  Respiratory: CTA  Gastrointestinal: soft, NT, ND, BS+, fistula thickish yellowish formed output, has punctate bleeding from wound bed. perc cony bilious,  Extremities: warm, no dependent edema  Vascular: no cyanosis/erythema  Skin: no rashes  MSK: no joint swelling.    LABS:    12-18    133<L>  |  100  |  38<H>  ----------------------------<  144<H>  4.0   |  26  |  1.58<H>    Ca    8.2<L>      18 Dec 2023 05:30  Phos  3.1     12-18  Mg     2.1     12-18    TPro  8.6<H>  /  Alb  2.5<L>  /  TBili  7.7<H>  /  DBili  5.2<H>  /  AST  114<H>  /  ALT  77<H>  /  AlkPhos  140<H>  12-18      Urinalysis Basic - ( 18 Dec 2023 05:30 )    Color: x / Appearance: x / SG: x / pH: x  Gluc: 144 mg/dL / Ketone: x  / Bili: x / Urobili: x   Blood: x / Protein: x / Nitrite: x   Leuk Esterase: x / RBC: x / WBC x   Sq Epi: x / Non Sq Epi: x / Bacteria: x        RADIOLOGY & ADDITIONAL STUDIES:     INTERVAL/OVERNIGHT EVENTS: NAEO.    SUBJECTIVE: This AM, doing well. Energy is good and ready for a walk. Nurses noted his Ostomy to be dark red. Otherwise no N/V or abd pain. No SOB/CP. Voids without issues.    Neurologic Medications  LORazepam   Injectable 0.5 milliGRAM(s) IV Push at bedtime PRN Anxiety  ondansetron Injectable 4 milliGRAM(s) IV Push every 6 hours PRN Nausea and/or Vomiting  venlafaxine XR. 150 milliGRAM(s) Oral daily    Cardiovascular Medications  hydrALAZINE Injectable 10 milliGRAM(s) IV Push every 6 hours PRN SBP >170  labetalol Injectable 10 milliGRAM(s) IV Push every 6 hours    Gastrointestinal Medications  ergocalciferol 25706 Unit(s) Oral <User Schedule>  lipid, fat emulsion (Fish Oil and Plant Based) 20% Infusion 0.54 Gm/kG/Day IV Continuous <Continuous>  Parenteral Nutrition - Adult 1 Each TPN Continuous <Continuous>  ursodiol Suspension 300 milliGRAM(s) Oral every 8 hours    Hematologic/Oncologic Medications  epoetin matt-epbx (RETACRIT) Injectable 49502 Unit(s) SubCutaneous every 7 days  heparin   Injectable 5000 Unit(s) SubCutaneous every 8 hours    Endocrine/Metabolic Medications  cholestyramine Powder (Sugar-Free) 4 Gram(s) Oral daily  glucagon  Injectable 1 milliGRAM(s) IntraMuscular once  levothyroxine Injectable 30 MICROGram(s) IV Push at bedtime    Topical/Other Medications  chlorhexidine 2% Cloths 1 Application(s) Topical <User Schedule>  lidocaine 2% Viscous 10 milliLiter(s) Swish and Spit every 12 hours PRN tongue pain    MEDICATIONS  (PRN):  hydrALAZINE Injectable 10 milliGRAM(s) IV Push every 6 hours PRN SBP >170  lidocaine 2% Viscous 10 milliLiter(s) Swish and Spit every 12 hours PRN tongue pain  LORazepam   Injectable 0.5 milliGRAM(s) IV Push at bedtime PRN Anxiety  ondansetron Injectable 4 milliGRAM(s) IV Push every 6 hours PRN Nausea and/or Vomiting    I&O's Detail    18 Dec 2023 07:01  -  19 Dec 2023 07:00  --------------------------------------------------------  IN:    Fat Emulsion (Fish Oil &amp; Plant Based) 20% Infusion: 291.2 mL    Oral Fluid: 840 mL    Sodium Chloride 0.9% Bolus: 1000 mL    TPN (Total Parenteral Nutrition): 2063 mL  Total IN: 4194.2 mL    OUT:    Drain (mL): 775 mL    Drain (mL): 1275 mL    Drain (mL): 20 mL    Voided (mL): 1780 mL  Total OUT: 3850 mL    Total NET: 344.2 mL    19 Dec 2023 07:01  -  19 Dec 2023 13:28  --------------------------------------------------------  IN:    TPN (Total Parenteral Nutrition): 121 mL  Total IN: 121 mL    OUT:    Drain (mL): 150 mL    Drain (mL): 200 mL    Fat Emulsion (Fish Oil &amp; Plant Based) 20% Infusion: 0 mL    Voided (mL): 400 mL  Total OUT: 750 mL    Total NET: -629 mL    Vital Signs Last 24 Hrs  T(C): 36.7 (19 Dec 2023 09:00), Max: 36.7 (18 Dec 2023 17:50)  T(F): 98.1 (19 Dec 2023 09:00), Max: 98.1 (18 Dec 2023 17:50)  HR: 75 (19 Dec 2023 12:00) (75 - 100)  BP: 109/58 (19 Dec 2023 12:00) (109/58 - 189/93)  BP(mean): 80 (19 Dec 2023 12:00) (80 - 131)  RR: 13 (19 Dec 2023 12:00) (12 - 29)  SpO2: 97% (19 Dec 2023 12:00) (93% - 100%)    Parameters below as of 19 Dec 2023 12:00  Patient On (Oxygen Delivery Method): room air    Neurological: AAOx3, CNII-XII intact,  strength 5/5 b/l  ENT: mucus membrane moist  Cardiovascular: RRR  Respiratory: CTA  Gastrointestinal: soft, NT, ND, BS+, fistula thickish yellowish formed output, has punctate bleeding from inferior aspect of stoma on granular tissue bed--surgicell placed and bleeding controlled. perc cony bilious,  Extremities: warm, no dependent edema  Vascular: no cyanosis/erythema  Skin: no rashes  MSK: no joint swelling.    LABS:    12-18    133<L>  |  100  |  38<H>  ----------------------------<  144<H>  4.0   |  26  |  1.58<H>    Ca    8.2<L>      18 Dec 2023 05:30  Phos  3.1     12-18  Mg     2.1     12-18    TPro  8.6<H>  /  Alb  2.5<L>  /  TBili  7.7<H>  /  DBili  5.2<H>  /  AST  114<H>  /  ALT  77<H>  /  AlkPhos  140<H>  12-18    Urinalysis Basic - ( 18 Dec 2023 05:30 )    Color: x / Appearance: x / SG: x / pH: x  Gluc: 144 mg/dL / Ketone: x  / Bili: x / Urobili: x   Blood: x / Protein: x / Nitrite: x   Leuk Esterase: x / RBC: x / WBC x   Sq Epi: x / Non Sq Epi: x / Bacteria: x INTERVAL/OVERNIGHT EVENTS: NAEO.    SUBJECTIVE: This AM, doing well. Energy is good and ready for a walk. Nurses noted his Ostomy to be dark red. Otherwise no N/V or abd pain. No SOB/CP. Voids without issues.    Neurologic Medications  LORazepam   Injectable 0.5 milliGRAM(s) IV Push at bedtime PRN Anxiety  ondansetron Injectable 4 milliGRAM(s) IV Push every 6 hours PRN Nausea and/or Vomiting  venlafaxine XR. 150 milliGRAM(s) Oral daily    Cardiovascular Medications  hydrALAZINE Injectable 10 milliGRAM(s) IV Push every 6 hours PRN SBP >170  labetalol Injectable 10 milliGRAM(s) IV Push every 6 hours    Gastrointestinal Medications  ergocalciferol 55072 Unit(s) Oral <User Schedule>  lipid, fat emulsion (Fish Oil and Plant Based) 20% Infusion 0.54 Gm/kG/Day IV Continuous <Continuous>  Parenteral Nutrition - Adult 1 Each TPN Continuous <Continuous>  ursodiol Suspension 300 milliGRAM(s) Oral every 8 hours    Hematologic/Oncologic Medications  epoetin matt-epbx (RETACRIT) Injectable 19781 Unit(s) SubCutaneous every 7 days  heparin   Injectable 5000 Unit(s) SubCutaneous every 8 hours    Endocrine/Metabolic Medications  cholestyramine Powder (Sugar-Free) 4 Gram(s) Oral daily  glucagon  Injectable 1 milliGRAM(s) IntraMuscular once  levothyroxine Injectable 30 MICROGram(s) IV Push at bedtime    Topical/Other Medications  chlorhexidine 2% Cloths 1 Application(s) Topical <User Schedule>  lidocaine 2% Viscous 10 milliLiter(s) Swish and Spit every 12 hours PRN tongue pain    MEDICATIONS  (PRN):  hydrALAZINE Injectable 10 milliGRAM(s) IV Push every 6 hours PRN SBP >170  lidocaine 2% Viscous 10 milliLiter(s) Swish and Spit every 12 hours PRN tongue pain  LORazepam   Injectable 0.5 milliGRAM(s) IV Push at bedtime PRN Anxiety  ondansetron Injectable 4 milliGRAM(s) IV Push every 6 hours PRN Nausea and/or Vomiting    I&O's Detail    18 Dec 2023 07:01  -  19 Dec 2023 07:00  --------------------------------------------------------  IN:    Fat Emulsion (Fish Oil &amp; Plant Based) 20% Infusion: 291.2 mL    Oral Fluid: 840 mL    Sodium Chloride 0.9% Bolus: 1000 mL    TPN (Total Parenteral Nutrition): 2063 mL  Total IN: 4194.2 mL    OUT:    Drain (mL): 775 mL    Drain (mL): 1275 mL    Drain (mL): 20 mL    Voided (mL): 1780 mL  Total OUT: 3850 mL    Total NET: 344.2 mL    19 Dec 2023 07:01  -  19 Dec 2023 13:28  --------------------------------------------------------  IN:    TPN (Total Parenteral Nutrition): 121 mL  Total IN: 121 mL    OUT:    Drain (mL): 150 mL    Drain (mL): 200 mL    Fat Emulsion (Fish Oil &amp; Plant Based) 20% Infusion: 0 mL    Voided (mL): 400 mL  Total OUT: 750 mL    Total NET: -629 mL    Vital Signs Last 24 Hrs  T(C): 36.7 (19 Dec 2023 09:00), Max: 36.7 (18 Dec 2023 17:50)  T(F): 98.1 (19 Dec 2023 09:00), Max: 98.1 (18 Dec 2023 17:50)  HR: 75 (19 Dec 2023 12:00) (75 - 100)  BP: 109/58 (19 Dec 2023 12:00) (109/58 - 189/93)  BP(mean): 80 (19 Dec 2023 12:00) (80 - 131)  RR: 13 (19 Dec 2023 12:00) (12 - 29)  SpO2: 97% (19 Dec 2023 12:00) (93% - 100%)    Parameters below as of 19 Dec 2023 12:00  Patient On (Oxygen Delivery Method): room air    Neurological: AAOx3, CNII-XII intact,  strength 5/5 b/l  ENT: mucus membrane moist  Cardiovascular: RRR  Respiratory: CTA  Gastrointestinal: soft, NT, ND, BS+, fistula thickish yellowish formed output, has punctate bleeding from inferior aspect of stoma on granular tissue bed--surgicell placed and bleeding controlled. perc cony bilious,  Extremities: warm, no dependent edema  Vascular: no cyanosis/erythema  Skin: no rashes  MSK: no joint swelling.    LABS:    12-18    133<L>  |  100  |  38<H>  ----------------------------<  144<H>  4.0   |  26  |  1.58<H>    Ca    8.2<L>      18 Dec 2023 05:30  Phos  3.1     12-18  Mg     2.1     12-18    TPro  8.6<H>  /  Alb  2.5<L>  /  TBili  7.7<H>  /  DBili  5.2<H>  /  AST  114<H>  /  ALT  77<H>  /  AlkPhos  140<H>  12-18    Urinalysis Basic - ( 18 Dec 2023 05:30 )    Color: x / Appearance: x / SG: x / pH: x  Gluc: 144 mg/dL / Ketone: x  / Bili: x / Urobili: x   Blood: x / Protein: x / Nitrite: x   Leuk Esterase: x / RBC: x / WBC x   Sq Epi: x / Non Sq Epi: x / Bacteria: x

## 2023-12-19 NOTE — CHART NOTE - NSCHARTNOTEFT_GEN_A_CORE
Admitting Diagnosis:   Patient is a 78y old  Male who presents with a chief complaint of SBO (19 Dec 2023 13:02)      PAST MEDICAL & SURGICAL HISTORY:  Essential hypertension  Hypertension      Atrial fibrillation  s/p cardioversion  and   Pt. reports 4 DCCV  Now on Amiodarone 200mg PO bid and Eliquis 5mg PO bid  Last DCCV 4 yrs ago at [facility]      Crohn's disease  s/p partial resection of ileum      Hyperlipidemia      Hypothyroidism      History of depression  On Venlafaxine ER 150mg PO bid      Junctional rhythm      H/O knee surgery      History of cataract surgery          Current Nutrition Order: TPN via PICC- 1.7L bag running over 14hrs (121ml/hr), providing 380g Dex, 100g AA, 50g SMOF lipids daily; provides 2192 kcals, 100g protein daily, GIR 4.86 mg/kg/min   PO- Regular diet + 2 LPS per day [provide 100 kcals, 15g protein each], + Ensure Max daily [150 kcals, 20g protein]    PO Intake: Good (%) [   ]  Fair (50-75%) [ x ] Poor (<25%) [   ]    GI Issues:   : ileostomy output 20cc x 24hrs, abdominal wound/fistula output 1275cc x 24hrs, per cony output 775cc x 24hrs      : ileostomy output 15cc x 24hrs, abdominal wound/fistula output  1230cc x 24hrs, per cony output 1010cc x 24hrs      : ileostomy output 75cc x 24hrs, abdominal wound/fistula output  600cc x 24hrs, per cony output 1125cc x 24hrs      : ileostomy output 35cc x 24hrs, abdominal wound/fistula output  250cc x 24hrs, per cony output 745cc x 24hrs      : ileostomy output 30cc x 24hrs, abdominal wound/fistula output  2400cc x 24hrs, per cony output 550cc x 24hrs     : ileostomy output 50 cc x 24hrs, abdominal wound/fistula output 2175 cc x 24hrs, per cony output 530 cc x 24hrs     : ileostomy output 25cc x 24hrs, abdominal wound/fistula output 2350 cc x 24hrs, per cony output 775cc x 24hrs     : ileostomy output 55cc x 24hrs, abdominal wound/fistula output 2025 cc x 24hrs, per cony output 450cc x 24hrs     11/15:  ileostomy output 65cc x 24hrs, abdominal wound/fistula output 875cc x 24hrs, per cony output 775cc x 24hrs    Pain: no pain/discomfort noted    Skin Integrity: jaundice, abd wound and fistula with wound manager in place, RLQ ileostomy, perc cony drain, lower back/healed wound, Rudy score 17    Labs:       133<L>  |  100  |  38<H>  ----------------------------<  144<H>  4.0   |  26  |  1.58<H>    Ca    8.2<L>      18 Dec 2023 05:30  Phos  3.1       Mg     2.1         TPro  8.6<H>  /  Alb  2.5<L>  /  TBili  7.7<H>  /  DBili  5.2<H>  /  AST  114<H>  /  ALT  77<H>  /  AlkPhos  140<H>      CAPILLARY BLOOD GLUCOSE          Medications:  MEDICATIONS  (STANDING):  chlorhexidine 2% Cloths 1 Application(s) Topical <User Schedule>  cholestyramine Powder (Sugar-Free) 4 Gram(s) Oral daily  epoetin matt-epbx (RETACRIT) Injectable 75718 Unit(s) SubCutaneous every 7 days  ergocalciferol 35203 Unit(s) Oral <User Schedule>  glucagon  Injectable 1 milliGRAM(s) IntraMuscular once  heparin   Injectable 5000 Unit(s) SubCutaneous every 8 hours  labetalol Injectable 10 milliGRAM(s) IV Push every 6 hours  levothyroxine Injectable 30 MICROGram(s) IV Push at bedtime  lipid, fat emulsion (Fish Oil and Plant Based) 20% Infusion 0.54 Gm/kG/Day (20.83 mL/Hr) IV Continuous <Continuous>  Parenteral Nutrition - Adult 1 Each TPN Continuous <Continuous>  Parenteral Nutrition - Adult 1 Each TPN Continuous <Continuous>  ursodiol Suspension 300 milliGRAM(s) Oral every 8 hours  venlafaxine XR. 150 milliGRAM(s) Oral daily    MEDICATIONS  (PRN):  hydrALAZINE Injectable 10 milliGRAM(s) IV Push every 6 hours PRN SBP >170  lidocaine 2% Viscous 10 milliLiter(s) Swish and Spit every 12 hours PRN tongue pain  LORazepam   Injectable 0.5 milliGRAM(s) IV Push at bedtime PRN Anxiety  ondansetron Injectable 4 milliGRAM(s) IV Push every 6 hours PRN Nausea and/or Vomiting      Daily Weight in k.3kg [19 Dec 2023]  Daily 93.3kg [11 Dec 2023]  Daily 93.3kg [08 Dec 2023]  Daily 93.3kg [6 Dec 2023]  Daily 94.2kg [2023]  Daily 94.4kg [2023]  Daily 94.2kg [2023]  Daily 94.2kg [2023]  Daily 94.2kg [2023]  Daily 94.2 [15 Nov 2023]  Daily 94.2kg [2023]  Daily 95.8kg [2023]  Daily 94.2kg [2023]  Daily 85.2kg [2023]  Daily 85.2kg [31 Oct 2023]  Daily 85.2kg [24 Oct 2023]  Daily 85.2kg [20 Oct 2023]  Daily 81.9kg [18 Oct 2023]  Daily 85.2kg [16 Oct 2023]  Daily   95.2kg [12 Oct 2023]   Daily 87.7kg [11 Oct 2023]  Daily 87.4kg [09 Oct 2023]  Daily 86.4kg [07 Oct 2023]  Daily 82.5kg [06 Oct 2023]  Daily 82.6kg [4 Oct 2023]   Daily 83.5kg [03 Oct 2023]  Daily 84.5kg [2 Oct 2023]  Daily 87.2kg [1 Oct 2023]  Daily 88.3kg [29 Sep 2023]  Daily 89.9kg [28 Sep 2023]  Daily 87.7kg [24 Sep 2023]  Daily 90.4kg [21 Sep 2023]  Daily 91.4kg [20 Sep 2023]  Daily 86.7kg [18 Sep 2023]  Daily 88.1kg [16 Sep 2023]  Daily 88.9kg [15 Sep 2023]   Daily 89.1 [14 Sep 2023]  Daily 92.9kg [6 Sep 2023]  Daily 91.8kg [27 Aug 2023]  Daily 101kg [9 Aug 2023]       Weight Change: weight trending down throughout admission, now appears to be trending back up. Recommend continue weekly/daily weights for trending & ensuring adequacy of nutrition provision    IBW: 86.4kg  % IBW: 108.0    Estimated energy needs:   Ideal body weight (86.4kg) used for calculations as pt >100% IBW and BMI <30 per [facility] Standards of Care. Needs estimated for age and adjusted for current clinical status, increased needs for post-op & open abd wound healing    Calories: 6564-7880 kcals based on 30-40 kcals/kg  Protein: 129.6-172.8 g protein based on 1.5-2.0g protein/kg + additional depending on outputs  *Fluid needs per team    Subjective:   78 M w/ Crohn's, AFib/Flutter s/p DCCVs on amiodarone, remote ileocectomy and open appendectomy. Admitted () for SBO vs Crohns flare, s/p NGT decompression and s/p lap TRE converted to open TRE, SBR x 3, left in discontinuity with abthera vac on (), RTOR for ileocolic resection, small bowel anastomosis, and abdominal wall closure on (), c/b fluid collection s/p IR aspiration of perihepatic fluid on (7/3), c/b wound dehiscence s/p RTOR exlap, washout, ileocolic resection with end ileostomy, blow hole colostomy, red rubber from ileostomy to small bowel anastomosis; vicryl bridging mesh on () transferred to SICU postoperatively for hemodynamic monitoring, with hospital course complicated by periods of AMS most recently on  and associated with oliguria, severe ELVI and hyponatremia, which resolved but stepped back up for to SICU on 9/10 for acute AMS, intermittent hypoglycemia, AFib with RVR. Percutaneous Cholecystostomy placed on  for enlarged GB, PCT capped 10/5.    Chart reviewed, discussed in IDT rounds this AM with team. 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 Unable to quantify kcal/protein obtained via PO diet due to inadequate lumen for absorption and high fistula outputs. Lipids in TPN previously increased to daily, TPN hang time shortened to 14 hrs. LFTs remain elevated. Renal function improving. Plan to increase amino acids in TPN bag today to better meet needs. Labs- Na 133 <L> [monitor fluid/hydration status], BUN 38 / Creat 1.58 <H> [improving], Albumin 2.5 <L>, total bilirubin 7.7 <H>, direct bilirubin 5.2 <H>, alk phos 140 <H>,  <H>, ALT 77 <H>, indirect bilirubin 2.4 <H>, prealbumin 9 <L>,  <H>. Meds- zofran prn, synthroid [administer 30-60 minutes before food; separate at least 4hrs from calcium or iron-containing products or bile acid sequestrants], vit D weekly, EPO, ursodiol, cholestyramine. TPN reordered for tonight. RDN will continue to monitor, reassess, and intervene as appropriate.     Previous Nutrition Diagnosis: Increased Nutrient Needs R/T physiological demands for nutrient AEB post-op wound healing    Active [ x  ]  Resolved [   ]    If resolved, new PES:     Goal: Pt will meet at least 75% of protein & energy needs via most appropriate route for nutrition     Recommendations:  1. c/w TPN via PICC as primary means to nutrition - recommend 1.9L bag running over 14hrs (135ml/hr), providing 380g Dex, 120g AA, 50g SMOF lipids daily; provides 2272 kcals, 120g protein daily, GIR 4.86 mg/kg/min [26.3 kcals/kg, 20.7 non-protein kcals/kg, 1.4g protein/kg]  - monitor weights & wound healing, adjust substrates as indicated  - fluid & lytes per team  - MVI, thiamine, vit C in bag  2. PO diet per team discretion  -  c/w Regular diet as medically feasible to allow for more menu options  - c/w 1 LPS BID [200 kcals, 30g protein] + 1 Ensure Max daily [150 kcals, 30g protein] as amenable  - consider NPO for bowel rest as indicated   - ongoing education prn  3. Hydration per team  - risk for dehydration with high outputs, monitor  - additional fluids per team  4. Continue Vit D supplementation  - *** RECOMMEND RECHECK VIT D LEVEL ***  5. Monitor renal function  - adjust protein in TPN prn  6. Fluids & lytes per team  - consider oral rehydration solution prn  7. Weekly lipid panel while on TPN  - hold/decrease lipids if TG >400  - continue to trend LFTs  8. Monitor BMP/Mg/Phos, POC BG while on TPN  - monitor & replenish lytes outside TPN bag prn  9. Continue close monitoring of clinical course & adjust recommendations prn    Education: current diet order, nutrition provision education prn    Risk Level: High [ x  ] Moderate [   ] Low [   ] Admitting Diagnosis:   Patient is a 78y old  Male who presents with a chief complaint of SBO (19 Dec 2023 13:02)      PAST MEDICAL & SURGICAL HISTORY:  Essential hypertension  Hypertension      Atrial fibrillation  s/p cardioversion  and   Pt. reports 4 DCCV  Now on Amiodarone 200mg PO bid and Eliquis 5mg PO bid  Last DCCV 4 yrs ago at       Crohn's disease  s/p partial resection of ileum      Hyperlipidemia      Hypothyroidism      History of depression  On Venlafaxine ER 150mg PO bid      Junctional rhythm      H/O knee surgery      History of cataract surgery          Current Nutrition Order: TPN via PICC- 1.7L bag running over 14hrs (121ml/hr), providing 380g Dex, 100g AA, 50g SMOF lipids daily; provides 2192 kcals, 100g protein daily, GIR 4.86 mg/kg/min   PO- Regular diet + 2 LPS per day [provide 100 kcals, 15g protein each], + Ensure Max daily [150 kcals, 20g protein]    PO Intake: Good (%) [   ]  Fair (50-75%) [ x ] Poor (<25%) [   ]    GI Issues:   : ileostomy output 20cc x 24hrs, abdominal wound/fistula output 1275cc x 24hrs, per cony output 775cc x 24hrs      : ileostomy output 15cc x 24hrs, abdominal wound/fistula output  1230cc x 24hrs, per cony output 1010cc x 24hrs      : ileostomy output 75cc x 24hrs, abdominal wound/fistula output  600cc x 24hrs, per cony output 1125cc x 24hrs      : ileostomy output 35cc x 24hrs, abdominal wound/fistula output  250cc x 24hrs, per cony output 745cc x 24hrs      : ileostomy output 30cc x 24hrs, abdominal wound/fistula output  2400cc x 24hrs, per cony output 550cc x 24hrs     : ileostomy output 50 cc x 24hrs, abdominal wound/fistula output 2175 cc x 24hrs, per cony output 530 cc x 24hrs     : ileostomy output 25cc x 24hrs, abdominal wound/fistula output 2350 cc x 24hrs, per cony output 775cc x 24hrs     : ileostomy output 55cc x 24hrs, abdominal wound/fistula output 2025 cc x 24hrs, per cony output 450cc x 24hrs     11/15:  ileostomy output 65cc x 24hrs, abdominal wound/fistula output 875cc x 24hrs, per cony output 775cc x 24hrs    Pain: no pain/discomfort noted    Skin Integrity: jaundice, abd wound and fistula with wound manager in place, RLQ ileostomy, perc cony drain, lower back/healed wound, Rudy score 17    Labs:       133<L>  |  100  |  38<H>  ----------------------------<  144<H>  4.0   |  26  |  1.58<H>    Ca    8.2<L>      18 Dec 2023 05:30  Phos  3.1       Mg     2.1         TPro  8.6<H>  /  Alb  2.5<L>  /  TBili  7.7<H>  /  DBili  5.2<H>  /  AST  114<H>  /  ALT  77<H>  /  AlkPhos  140<H>      CAPILLARY BLOOD GLUCOSE          Medications:  MEDICATIONS  (STANDING):  chlorhexidine 2% Cloths 1 Application(s) Topical <User Schedule>  cholestyramine Powder (Sugar-Free) 4 Gram(s) Oral daily  epoetin matt-epbx (RETACRIT) Injectable 52950 Unit(s) SubCutaneous every 7 days  ergocalciferol 29277 Unit(s) Oral <User Schedule>  glucagon  Injectable 1 milliGRAM(s) IntraMuscular once  heparin   Injectable 5000 Unit(s) SubCutaneous every 8 hours  labetalol Injectable 10 milliGRAM(s) IV Push every 6 hours  levothyroxine Injectable 30 MICROGram(s) IV Push at bedtime  lipid, fat emulsion (Fish Oil and Plant Based) 20% Infusion 0.54 Gm/kG/Day (20.83 mL/Hr) IV Continuous <Continuous>  Parenteral Nutrition - Adult 1 Each TPN Continuous <Continuous>  Parenteral Nutrition - Adult 1 Each TPN Continuous <Continuous>  ursodiol Suspension 300 milliGRAM(s) Oral every 8 hours  venlafaxine XR. 150 milliGRAM(s) Oral daily    MEDICATIONS  (PRN):  hydrALAZINE Injectable 10 milliGRAM(s) IV Push every 6 hours PRN SBP >170  lidocaine 2% Viscous 10 milliLiter(s) Swish and Spit every 12 hours PRN tongue pain  LORazepam   Injectable 0.5 milliGRAM(s) IV Push at bedtime PRN Anxiety  ondansetron Injectable 4 milliGRAM(s) IV Push every 6 hours PRN Nausea and/or Vomiting      Daily Weight in k.3kg [19 Dec 2023]  Daily 93.3kg [11 Dec 2023]  Daily 93.3kg [08 Dec 2023]  Daily 93.3kg [6 Dec 2023]  Daily 94.2kg [2023]  Daily 94.4kg [2023]  Daily 94.2kg [2023]  Daily 94.2kg [2023]  Daily 94.2kg [2023]  Daily 94.2 [15 Nov 2023]  Daily 94.2kg [2023]  Daily 95.8kg [2023]  Daily 94.2kg [2023]  Daily 85.2kg [2023]  Daily 85.2kg [31 Oct 2023]  Daily 85.2kg [24 Oct 2023]  Daily 85.2kg [20 Oct 2023]  Daily 81.9kg [18 Oct 2023]  Daily 85.2kg [16 Oct 2023]  Daily   95.2kg [12 Oct 2023]   Daily 87.7kg [11 Oct 2023]  Daily 87.4kg [09 Oct 2023]  Daily 86.4kg [07 Oct 2023]  Daily 82.5kg [06 Oct 2023]  Daily 82.6kg [4 Oct 2023]   Daily 83.5kg [03 Oct 2023]  Daily 84.5kg [2 Oct 2023]  Daily 87.2kg [1 Oct 2023]  Daily 88.3kg [29 Sep 2023]  Daily 89.9kg [28 Sep 2023]  Daily 87.7kg [24 Sep 2023]  Daily 90.4kg [21 Sep 2023]  Daily 91.4kg [20 Sep 2023]  Daily 86.7kg [18 Sep 2023]  Daily 88.1kg [16 Sep 2023]  Daily 88.9kg [15 Sep 2023]   Daily 89.1 [14 Sep 2023]  Daily 92.9kg [6 Sep 2023]  Daily 91.8kg [27 Aug 2023]  Daily 101kg [9 Aug 2023]       Weight Change: weight trending down throughout admission, now appears to be trending back up. Recommend continue weekly/daily weights for trending & ensuring adequacy of nutrition provision    IBW: 86.4kg  % IBW: 108.0    Estimated energy needs:   Ideal body weight (86.4kg) used for calculations as pt >100% IBW and BMI <30 per [facility] Standards of Care. Needs estimated for age and adjusted for current clinical status, increased needs for post-op & open abd wound healing    Calories: 2607-4715 kcals based on 30-40 kcals/kg  Protein: 129.6-172.8 g protein based on 1.5-2.0g protein/kg + additional depending on outputs  *Fluid needs per team    Subjective:   78 M w/ Crohn's, AFib/Flutter s/p DCCVs on amiodarone, remote ileocectomy and open appendectomy. Admitted () for SBO vs Crohns flare, s/p NGT decompression and s/p lap TRE converted to open TRE, SBR x 3, left in discontinuity with abthera vac on (), RTOR for ileocolic resection, small bowel anastomosis, and abdominal wall closure on (), c/b fluid collection s/p IR aspiration of perihepatic fluid on (7/3), c/b wound dehiscence s/p RTOR exlap, washout, ileocolic resection with end ileostomy, blow hole colostomy, red rubber from ileostomy to small bowel anastomosis; vicryl bridging mesh on () transferred to SICU postoperatively for hemodynamic monitoring, with hospital course complicated by periods of AMS most recently on  and associated with oliguria, severe ELVI and hyponatremia, which resolved but stepped back up for to SICU on 9/10 for acute AMS, intermittent hypoglycemia, AFib with RVR. Percutaneous Cholecystostomy placed on  for enlarged GB, PCT capped 10/5.    Chart reviewed, discussed in IDT rounds this AM with team. 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 Unable to quantify kcal/protein obtained via PO diet due to inadequate lumen for absorption and high fistula outputs. Lipids in TPN previously increased to daily, TPN hang time shortened to 14 hrs. LFTs remain elevated. Renal function improving. Plan to increase amino acids in TPN bag today to better meet needs. Labs- Na 133 <L> [monitor fluid/hydration status], BUN 38 / Creat 1.58 <H> [improving], Albumin 2.5 <L>, total bilirubin 7.7 <H>, direct bilirubin 5.2 <H>, alk phos 140 <H>,  <H>, ALT 77 <H>, indirect bilirubin 2.4 <H>, prealbumin 9 <L>,  <H>. Meds- zofran prn, synthroid [administer 30-60 minutes before food; separate at least 4hrs from calcium or iron-containing products or bile acid sequestrants], vit D weekly, EPO, ursodiol, cholestyramine. TPN reordered for tonight. RDN will continue to monitor, reassess, and intervene as appropriate.     Previous Nutrition Diagnosis: Increased Nutrient Needs R/T physiological demands for nutrient AEB post-op wound healing    Active [ x  ]  Resolved [   ]    If resolved, new PES:     Goal: Pt will meet at least 75% of protein & energy needs via most appropriate route for nutrition     Recommendations:  1. c/w TPN via PICC as primary means to nutrition - recommend 1.9L bag running over 14hrs (135ml/hr), providing 380g Dex, 120g AA, 50g SMOF lipids daily; provides 2272 kcals, 120g protein daily, GIR 4.86 mg/kg/min [26.3 kcals/kg, 20.7 non-protein kcals/kg, 1.4g protein/kg]  - monitor weights & wound healing, adjust substrates as indicated  - fluid & lytes per team  - MVI, thiamine, vit C in bag  2. PO diet per team discretion  -  c/w Regular diet as medically feasible to allow for more menu options  - c/w 1 LPS BID [200 kcals, 30g protein] + 1 Ensure Max daily [150 kcals, 30g protein] as amenable  - consider NPO for bowel rest as indicated   - ongoing education prn  3. Hydration per team  - risk for dehydration with high outputs, monitor  - additional fluids per team  4. Continue Vit D supplementation  - *** RECOMMEND RECHECK VIT D LEVEL ***  5. Monitor renal function  - adjust protein in TPN prn  6. Fluids & lytes per team  - consider oral rehydration solution prn  7. Weekly lipid panel while on TPN  - hold/decrease lipids if TG >400  - continue to trend LFTs  8. Monitor BMP/Mg/Phos, POC BG while on TPN  - monitor & replenish lytes outside TPN bag prn  9. Continue close monitoring of clinical course & adjust recommendations prn    Education: current diet order, nutrition provision education prn    Risk Level: High [ x  ] Moderate [   ] Low [   ]

## 2023-12-19 NOTE — PROGRESS NOTE ADULT - ASSESSMENT
78M w PMH Crohn's, AFib/Flutter s/p DCCVs on amiodarone, remote ileocectomy and open appendectomy. Admitted (6/23) for SBO vs Crohns flare, s/p NGT decompression and s/p lap converted to open TRE, SBR x 3, left in discontinuity with abthera vac on (6/27), RTOR for ileocolic resection, small bowel anastomosis, and abdominal wall closure on (6/28), c/b fluid collection s/p IR aspiration of perihepatic fluid on (7/3), c/b wound dehiscence s/p RTOR exlap, washout, ileocolic resection with end ileostomy, blow hole colostomy, red rubber from ileostomy to small bowel anastomosis; vicryl bridging mesh on (7/5) transferred to SICU postoperatively for hemodynamic monitoring, with hospital course complicated by periods of severe ELVI and hyponatremia, which resolved but stepped back up for to SICU on (9/10) for acute AMS, intermittent hypoglycemia, AFib with RVR. Percutaneous Cholecystostomy placed on (9/11) for enlarged GB, PCT capped 10/5 and uncapped 10/25 for hyperbilirubinemia, Right brachial DVT, left basillic and cephalic superficial thrombus on duplex 11/2, CT 11/14 with ALDEN ground glass opacity of unclear etiology, completed empiric 7day cefepime course 11/22, rising T-bili of unclear etilogy, now w resolved candida glabrata fungemia, ELVI resolving.    NEURO: AMS resolved, likely septic encephalopathy, EEG neg. Hx of depression - cont Effexor. Nausea: Zofran PRN. Anxiety: Lorazepam PRN.  CV: Hx Afib/flutter: s/p DCCV, Amio stopped due to persistent transaminitis. Continue labetalol IV 10q6h  with hold parameters. Hx HLD: Lipitor held given high LFTs. TTE  (7/18) - PASP 64mmHg, EF 65-70%, Hx HTN - stopped PO Norvasc as unlikely absorbing, holding Losartan. BP borderline - continue hydralazine PRN.   PULM: Atelectasis/dyspnea improved clinically: lower to 1 hr of BiPAP every 12hrs of nasal canula or room air. Encourage OOB and IS.  CT from 11/14 with ALDEN ground glass opacity of unclear etiology. COVID negative. RVP negative. completed 7d empiric cefepime 11/22 to cover for potential PNA.   GI/FEN: Low res, low fat, high protein diet. Cont Ensure max x1/day. Folate, thiamine. fungemia likely CLABSI. PICC replaced on 12/4 and continue TPN qd, lipids qd.  High output EC fistula: cont Octreotide & Cholestyramine 10/18. Transaminitis elevated T bili of unclear origin, s/p Perc Deb on 9/11; MRCP 10/30 no obstruction, seen by Hepatology started on ursodiol, RUQ US 11/25 CBD 5mm, hepatic vessels patent Repeat. MRCP neg. Monitor drainage from fistula, bolus as needed to keep I&O even.    : Voids. ELVI, Cr trending down. stopped famotidine and half Effexor dose given renal dysfunction. Caspo unlikely etiology of ELVI per ID. s/p brief bicarb gtt.  MARLO Duplex and RP US unremarkable, CK wnl.  f/u renal recs pending glomerulonephritis labs.  ENDO: Hx hypothyroid: IV Synthroid, TSH low on 11/30, decreased synthroid dose, repeat TSH 12/6 wnl.  ID: BCx 11/22 grew candida glabrata, likely CLABSI. ID consulted, s/p Caspo, switched to Fluconazole (12/1-12/9). Repeat surveillance blood cx NGTD. Ophthalmology evaulated - normal ocular exam. Echo no vegetation. PICC replaced on 12/4.  Recent MRSA swab negative, RVP panel negative, COVID negative. Cont Nystatin powder // PREVIOUSLY  caspofungin (11/24-12/1). completed empiric 7days cefepime (11/15-11/22), had C. tertium, Lactobacillis from his IR cx 7/3, and candida albicans, lactobacillus, vanc sensitive E faecium, vanc resistant E gallinarum, vanc resistant E casseliflavus, lactobacillus from his OR cx 7/5. Completed course of abx with Imipenem (9/10-9/15). Imipenem (8/26--) imipenem (6/30-7/12, 7/23-7/30), Dapto (6/30-7/5 and 7/23-7/24). Empiric dapto (8/23-24) and cefepime (8/23-24).   PPX: SCDs, SQH, was on Therapeutic lovenox bid for R brachial vein DVT, but will hold off on hep gtt since repeat US Duplex negative.  HEME: Anemia - continue Epogen weekly. S/p Iron Sucrose 200 qd x 3 days for chronic anemia. Surgicell placed on inferior aspect of stoma on granulation tissue--bleeding controlled.  LINES: ZAHEER Pinto PICC (12/4 - ) will plan to switch PICC once a month // DC: LUE PICC (9/1-11/1 ), RUE PICC: (11/1-11/24)   WOUNDS/DRAINS: Abdominal wound and fistula with wound manager in place dressing change Tuesday and Friday. RLQ Ostomy. IR perc deb tube. // DC: L Clay drain.  PT: Ambulate as tolerated OOB to chair daily.  DISPO: SICU due to complicated hospital course.

## 2023-12-19 NOTE — PROGRESS NOTE ADULT - NS ATTEND AMEND GEN_ALL_CORE FT
AF, UC, s/p SBR with ileocolic resection, ileostomy, multiple infections  physical as above  continue TPN, increase protein back to 1.5 g/kg/d  follow renal function which is improving  point bleeding being followed  rest as above

## 2023-12-20 NOTE — PROGRESS NOTE ADULT - NS ATTEND AMEND GEN_ALL_CORE FT
AF, UC, s/p SBR, ileocolic resection, ileostomy, enteroatmospheric fistula  physical as above  PICC site clean  continue TPN  for now requiring a liter bolus of crystalloid daily  continue BIPAP one hour Q 12h  rest as above

## 2023-12-20 NOTE — ADVANCED PRACTICE NURSE CONSULT - ASSESSMENT
Stomatized fistula functioning with thick yellow liquid effluent. Donny-fistula skin with scattered denuded areas. Donny-fistula skin bleeding resolved.   WOCN applied stoma powder to donny-fistula skin, sealed powder with skin prep, applied ostomy barrier rings around fistula, sealed barrier rings with stoma paste, applied a Jayleen one piece soft convex, high output pouching system. Secured edges with sections of hydrocolloid skin barrier.

## 2023-12-20 NOTE — PROGRESS NOTE ADULT - ASSESSMENT
78M w PMH Crohn's, AFib/Flutter s/p DCCVs on amiodarone, remote ileocectomy and open appendectomy. Admitted (6/23) for SBO vs Crohns flare, s/p NGT decompression and s/p lap converted to open TRE, SBR x 3, left in discontinuity with abthera vac on (6/27), RTOR for ileocolic resection, small bowel anastomosis, and abdominal wall closure on (6/28), c/b fluid collection s/p IR aspiration of perihepatic fluid on (7/3), c/b wound dehiscence s/p RTOR exlap, washout, ileocolic resection with end ileostomy, blow hole colostomy, red rubber from ileostomy to small bowel anastomosis; vicryl bridging mesh on (7/5) transferred to SICU postoperatively for hemodynamic monitoring, with hospital course complicated by periods of severe ELVI and hyponatremia, which resolved but stepped back up for to SICU on (9/10) for acute AMS, intermittent hypoglycemia, AFib with RVR. Percutaneous Cholecystostomy placed on (9/11) for enlarged GB, PCT capped 10/5 and uncapped 10/25 for hyperbilirubinemia, Right brachial DVT, left basillic and cephalic superficial thrombus on duplex 11/2, CT 11/14 with ALDEN ground glass opacity of unclear etiology, completed empiric 7day cefepime course 11/22, rising T-bili of unclear etilogy, now w resolved candida glabrata fungemia, ELVI resolving.    Trend Cr, f/u Nephrology w/ addl recs   Continue diet as tolerated   f/u hepatology recommendations   Wound care as per ostomy nurses   Continue to watch hydration status and repleting/restricting  Continue with PT and ambulation  Rest of care per primary team  Surgery team 5 will continue to follow, reach out with any questions or concerns: [phone] 78M w PMH Crohn's, AFib/Flutter s/p DCCVs on amiodarone, remote ileocectomy and open appendectomy. Admitted (6/23) for SBO vs Crohns flare, s/p NGT decompression and s/p lap converted to open TRE, SBR x 3, left in discontinuity with abthera vac on (6/27), RTOR for ileocolic resection, small bowel anastomosis, and abdominal wall closure on (6/28), c/b fluid collection s/p IR aspiration of perihepatic fluid on (7/3), c/b wound dehiscence s/p RTOR exlap, washout, ileocolic resection with end ileostomy, blow hole colostomy, red rubber from ileostomy to small bowel anastomosis; vicryl bridging mesh on (7/5) transferred to SICU postoperatively for hemodynamic monitoring, with hospital course complicated by periods of severe ELVI and hyponatremia, which resolved but stepped back up for to SICU on (9/10) for acute AMS, intermittent hypoglycemia, AFib with RVR. Percutaneous Cholecystostomy placed on (9/11) for enlarged GB, PCT capped 10/5 and uncapped 10/25 for hyperbilirubinemia, Right brachial DVT, left basillic and cephalic superficial thrombus on duplex 11/2, CT 11/14 with ALDEN ground glass opacity of unclear etiology, completed empiric 7day cefepime course 11/22, rising T-bili of unclear etilogy, now w resolved candida glabrata fungemia, ELVI resolving.    Trend Cr, f/u Nephrology w/ addl recs   Continue diet as tolerated   f/u hepatology recommendations   Wound care as per ostomy nurses   Continue to watch hydration status and repleting/restricting  Continue with PT and ambulation  Rest of care per primary team  Surgery team 5 will continue to follow, reach out with any questions or concerns: [phone]

## 2023-12-20 NOTE — PROGRESS NOTE ADULT - SUBJECTIVE AND OBJECTIVE BOX
INTERVAL HPI/OVERNIGHT EVENTS: Given ativan 0.25 per Dr. IZABELA Siddiqi neg 600mls this am. Given 500 NS bolus     STATUS POST: 6/27: Laparoscopic lysis of adhesions, converted to open lysis of adhesions, SBR x 3, temporary abdominal closure, 5L cryst, 1L 5% alb, 3 pRBC, 1 FFP, 1 plts  6/28: ex lap, removal of abthera, ileocolic resection, small bowel anastamosis, , 1.6 crystalloid, 250 5%, 175 uop   7/3: IR Gendel drained perihepatic aspiration of serous fluid.   7/5: RTOR exlap, washout, ileocolic resection with end ileostomy, blow hole colostomy, fistula, red rubber from ileostomy to small bowel anastomosis; vicryl bridging mesh; R JOYCE below ileostomy, L JOYCE at small bowel enterotomy repair; 500 LR, 500 5% albumin, 3u PRBC, 2 FFP, 400 UOP,   9/11: s/p perc cony     SUBJECTIVE: Patient seen and examined bedside with chief resident on AM rounds. He reports feeling well today. He is tolerating his diet and has been out of bed ambulating. Denies cp, sob, nausea, emesis, or fevers.     MEDICATIONS  (STANDING):  chlorhexidine 2% Cloths 1 Application(s) Topical <User Schedule>  cholestyramine Powder (Sugar-Free) 4 Gram(s) Oral daily  epoetin matt-epbx (RETACRIT) Injectable 12811 Unit(s) SubCutaneous every 7 days  ergocalciferol 18611 Unit(s) Oral <User Schedule>  glucagon  Injectable 1 milliGRAM(s) IntraMuscular once  heparin   Injectable 5000 Unit(s) SubCutaneous every 8 hours  labetalol Injectable 10 milliGRAM(s) IV Push every 6 hours  levothyroxine Injectable 30 MICROGram(s) IV Push at bedtime  lipid, fat emulsion (Fish Oil and Plant Based) 20% Infusion 0.54 Gm/kG/Day (20.83 mL/Hr) IV Continuous <Continuous>  Parenteral Nutrition - Adult 1 Each TPN Continuous <Continuous>  sodium chloride 0.9% Bolus 500 milliLiter(s) IV Bolus once  ursodiol Suspension 300 milliGRAM(s) Oral every 8 hours  venlafaxine XR. 150 milliGRAM(s) Oral daily    MEDICATIONS  (PRN):  hydrALAZINE Injectable 10 milliGRAM(s) IV Push every 6 hours PRN SBP >170  lidocaine 2% Viscous 10 milliLiter(s) Swish and Spit every 12 hours PRN tongue pain  LORazepam   Injectable 0.5 milliGRAM(s) IV Push at bedtime PRN Anxiety  ondansetron Injectable 4 milliGRAM(s) IV Push every 6 hours PRN Nausea and/or Vomiting      Vital Signs Last 24 Hrs  T(C): 36.5 (20 Dec 2023 06:17), Max: 36.7 (19 Dec 2023 09:00)  T(F): 97.7 (20 Dec 2023 06:17), Max: 98.1 (19 Dec 2023 09:00)  HR: 81 (20 Dec 2023 06:00) (70 - 98)  BP: 155/70 (20 Dec 2023 06:00) (109/58 - 168/69)  BP(mean): 100 (20 Dec 2023 06:00) (80 - 104)  RR: 22 (20 Dec 2023 06:00) (12 - 39)  SpO2: 95% (20 Dec 2023 06:00) (93% - 100%)    Parameters below as of 20 Dec 2023 06:00  Patient On (Oxygen Delivery Method): room air      PHYSICAL EXAM:  Constitutional: A&Ox3, sitting up comfortably in bed  Respiratory: non labored breathing, no respiratory distress  Cardiovascular: NSR, RRR  Gastrointestinal: Abdomen soft, NT/ND. Fistula with thick yellow output. Stoma pink and patent. Ostomy with stool and gas. Perc cony drain bilious.   Genitourinary: voiding  Extremities: wwp, no calf tenderness or edema, +SCDs      I&O's Detail    19 Dec 2023 07:01  -  20 Dec 2023 07:00  --------------------------------------------------------  IN:    Fat Emulsion (Fish Oil &amp; Plant Based) 20% Infusion: 270.4 mL    IV PiggyBack: 500 mL    Lactated Ringers Bolus: 1000 mL    TPN (Total Parenteral Nutrition): 1697 mL  Total IN: 3467.4 mL    OUT:    Drain (mL): 1400 mL    Drain (mL): 975 mL    Drain (mL): 75 mL    Fat Emulsion (Fish Oil &amp; Plant Based) 20% Infusion: 0 mL    Voided (mL): 1450 mL  Total OUT: 3900 mL    Total NET: -432.6 mL          LABS:                        7.8    6.65  )-----------( 246      ( 20 Dec 2023 05:30 )             24.3     12-20    135  |  102  |  41<H>  ----------------------------<  127<H>  4.5   |  26  |  1.58<H>    Ca    8.3<L>      20 Dec 2023 05:30  Phos  3.7     12-20  Mg     2.1     12-20    TPro  8.6<H>  /  Alb  2.2<L>  /  TBili  7.6<H>  /  DBili  5.1<H>  /  AST  119<H>  /  ALT  76<H>  /  AlkPhos  129<H>  12-20      Urinalysis Basic - ( 20 Dec 2023 05:30 )    Color: x / Appearance: x / SG: x / pH: x  Gluc: 127 mg/dL / Ketone: x  / Bili: x / Urobili: x   Blood: x / Protein: x / Nitrite: x   Leuk Esterase: x / RBC: x / WBC x   Sq Epi: x / Non Sq Epi: x / Bacteria: x        RADIOLOGY & ADDITIONAL STUDIES: INTERVAL HPI/OVERNIGHT EVENTS: Given ativan 0.25 per Dr. IZABELA Siddiqi neg 600mls this am. Given 500 NS bolus     STATUS POST: 6/27: Laparoscopic lysis of adhesions, converted to open lysis of adhesions, SBR x 3, temporary abdominal closure, 5L cryst, 1L 5% alb, 3 pRBC, 1 FFP, 1 plts  6/28: ex lap, removal of abthera, ileocolic resection, small bowel anastamosis, , 1.6 crystalloid, 250 5%, 175 uop   7/3: IR Gendel drained perihepatic aspiration of serous fluid.   7/5: RTOR exlap, washout, ileocolic resection with end ileostomy, blow hole colostomy, fistula, red rubber from ileostomy to small bowel anastomosis; vicryl bridging mesh; R JOYCE below ileostomy, L JOYCE at small bowel enterotomy repair; 500 LR, 500 5% albumin, 3u PRBC, 2 FFP, 400 UOP,   9/11: s/p perc cony     SUBJECTIVE: Patient seen and examined bedside with chief resident on AM rounds. He reports feeling well today. He is tolerating his diet and has been out of bed ambulating. Denies cp, sob, nausea, emesis, or fevers.     MEDICATIONS  (STANDING):  chlorhexidine 2% Cloths 1 Application(s) Topical <User Schedule>  cholestyramine Powder (Sugar-Free) 4 Gram(s) Oral daily  epoetin matt-epbx (RETACRIT) Injectable 39358 Unit(s) SubCutaneous every 7 days  ergocalciferol 81649 Unit(s) Oral <User Schedule>  glucagon  Injectable 1 milliGRAM(s) IntraMuscular once  heparin   Injectable 5000 Unit(s) SubCutaneous every 8 hours  labetalol Injectable 10 milliGRAM(s) IV Push every 6 hours  levothyroxine Injectable 30 MICROGram(s) IV Push at bedtime  lipid, fat emulsion (Fish Oil and Plant Based) 20% Infusion 0.54 Gm/kG/Day (20.83 mL/Hr) IV Continuous <Continuous>  Parenteral Nutrition - Adult 1 Each TPN Continuous <Continuous>  sodium chloride 0.9% Bolus 500 milliLiter(s) IV Bolus once  ursodiol Suspension 300 milliGRAM(s) Oral every 8 hours  venlafaxine XR. 150 milliGRAM(s) Oral daily    MEDICATIONS  (PRN):  hydrALAZINE Injectable 10 milliGRAM(s) IV Push every 6 hours PRN SBP >170  lidocaine 2% Viscous 10 milliLiter(s) Swish and Spit every 12 hours PRN tongue pain  LORazepam   Injectable 0.5 milliGRAM(s) IV Push at bedtime PRN Anxiety  ondansetron Injectable 4 milliGRAM(s) IV Push every 6 hours PRN Nausea and/or Vomiting      Vital Signs Last 24 Hrs  T(C): 36.5 (20 Dec 2023 06:17), Max: 36.7 (19 Dec 2023 09:00)  T(F): 97.7 (20 Dec 2023 06:17), Max: 98.1 (19 Dec 2023 09:00)  HR: 81 (20 Dec 2023 06:00) (70 - 98)  BP: 155/70 (20 Dec 2023 06:00) (109/58 - 168/69)  BP(mean): 100 (20 Dec 2023 06:00) (80 - 104)  RR: 22 (20 Dec 2023 06:00) (12 - 39)  SpO2: 95% (20 Dec 2023 06:00) (93% - 100%)    Parameters below as of 20 Dec 2023 06:00  Patient On (Oxygen Delivery Method): room air      PHYSICAL EXAM:  Constitutional: A&Ox3, sitting up comfortably in bed  Respiratory: non labored breathing, no respiratory distress  Cardiovascular: NSR, RRR  Gastrointestinal: Abdomen soft, NT/ND. Fistula with thick yellow output. Stoma pink and patent. Ostomy with stool and gas. Perc cony drain bilious.   Genitourinary: voiding  Extremities: wwp, no calf tenderness or edema, +SCDs      I&O's Detail    19 Dec 2023 07:01  -  20 Dec 2023 07:00  --------------------------------------------------------  IN:    Fat Emulsion (Fish Oil &amp; Plant Based) 20% Infusion: 270.4 mL    IV PiggyBack: 500 mL    Lactated Ringers Bolus: 1000 mL    TPN (Total Parenteral Nutrition): 1697 mL  Total IN: 3467.4 mL    OUT:    Drain (mL): 1400 mL    Drain (mL): 975 mL    Drain (mL): 75 mL    Fat Emulsion (Fish Oil &amp; Plant Based) 20% Infusion: 0 mL    Voided (mL): 1450 mL  Total OUT: 3900 mL    Total NET: -432.6 mL          LABS:                        7.8    6.65  )-----------( 246      ( 20 Dec 2023 05:30 )             24.3     12-20    135  |  102  |  41<H>  ----------------------------<  127<H>  4.5   |  26  |  1.58<H>    Ca    8.3<L>      20 Dec 2023 05:30  Phos  3.7     12-20  Mg     2.1     12-20    TPro  8.6<H>  /  Alb  2.2<L>  /  TBili  7.6<H>  /  DBili  5.1<H>  /  AST  119<H>  /  ALT  76<H>  /  AlkPhos  129<H>  12-20      Urinalysis Basic - ( 20 Dec 2023 05:30 )    Color: x / Appearance: x / SG: x / pH: x  Gluc: 127 mg/dL / Ketone: x  / Bili: x / Urobili: x   Blood: x / Protein: x / Nitrite: x   Leuk Esterase: x / RBC: x / WBC x   Sq Epi: x / Non Sq Epi: x / Bacteria: x        RADIOLOGY & ADDITIONAL STUDIES: INTERVAL HPI/OVERNIGHT EVENTS: Given ativan 0.25 per Dr. IZABELA Siddiqi neg 600mls this am. Given 500 NS bolus     STATUS POST: 6/27: Laparoscopic lysis of adhesions, converted to open lysis of adhesions, SBR x 3, temporary abdominal closure, 5L cryst, 1L 5% alb, 3 pRBC, 1 FFP, 1 plts  6/28: ex lap, removal of abthera, ileocolic resection, small bowel anastamosis, , 1.6 crystalloid, 250 5%, 175 uop   7/3: IR Gendel drained perihepatic aspiration of serous fluid.   7/5: RTOR exlap, washout, ileocolic resection with end ileostomy, blow hole colostomy, fistula, red rubber from ileostomy to small bowel anastomosis; vicryl bridging mesh; R JOYCE below ileostomy, L JOYCE at small bowel enterotomy repair; 500 LR, 500 5% albumin, 3u PRBC, 2 FFP, 400 UOP,   9/11: s/p perc cony     SUBJECTIVE: Patient seen and examined bedside with chief resident on AM rounds. He reports feeling well today. He is tolerating his diet and has been out of bed ambulating. Denies cp, sob, nausea, emesis, or fevers.     MEDICATIONS  (STANDING):  chlorhexidine 2% Cloths 1 Application(s) Topical <User Schedule>  cholestyramine Powder (Sugar-Free) 4 Gram(s) Oral daily  epoetin matt-epbx (RETACRIT) Injectable 44177 Unit(s) SubCutaneous every 7 days  ergocalciferol 52943 Unit(s) Oral <User Schedule>  glucagon  Injectable 1 milliGRAM(s) IntraMuscular once  heparin   Injectable 5000 Unit(s) SubCutaneous every 8 hours  labetalol Injectable 10 milliGRAM(s) IV Push every 6 hours  levothyroxine Injectable 30 MICROGram(s) IV Push at bedtime  lipid, fat emulsion (Fish Oil and Plant Based) 20% Infusion 0.54 Gm/kG/Day (20.83 mL/Hr) IV Continuous <Continuous>  Parenteral Nutrition - Adult 1 Each TPN Continuous <Continuous>  sodium chloride 0.9% Bolus 500 milliLiter(s) IV Bolus once  ursodiol Suspension 300 milliGRAM(s) Oral every 8 hours  venlafaxine XR. 150 milliGRAM(s) Oral daily    MEDICATIONS  (PRN):  hydrALAZINE Injectable 10 milliGRAM(s) IV Push every 6 hours PRN SBP >170  lidocaine 2% Viscous 10 milliLiter(s) Swish and Spit every 12 hours PRN tongue pain  LORazepam   Injectable 0.5 milliGRAM(s) IV Push at bedtime PRN Anxiety  ondansetron Injectable 4 milliGRAM(s) IV Push every 6 hours PRN Nausea and/or Vomiting      Vital Signs Last 24 Hrs  T(C): 36.5 (20 Dec 2023 06:17), Max: 36.7 (19 Dec 2023 09:00)  T(F): 97.7 (20 Dec 2023 06:17), Max: 98.1 (19 Dec 2023 09:00)  HR: 81 (20 Dec 2023 06:00) (70 - 98)  BP: 155/70 (20 Dec 2023 06:00) (109/58 - 168/69)  BP(mean): 100 (20 Dec 2023 06:00) (80 - 104)  RR: 22 (20 Dec 2023 06:00) (12 - 39)  SpO2: 95% (20 Dec 2023 06:00) (93% - 100%)    Parameters below as of 20 Dec 2023 06:00  Patient On (Oxygen Delivery Method): room air      PHYSICAL EXAM:  Constitutional: A&Ox3, sitting up comfortably in bed, +jaundice  Respiratory: non labored breathing, no respiratory distress  Cardiovascular: NSR, RRR  Gastrointestinal: Abdomen soft, NT/ND. Fistula with thick yellow output. Stoma pink and patent. Ostomy with stool and gas. Perc cony drain bilious.   Genitourinary: voiding  Extremities: wwp, no calf tenderness or edema, +SCDs      I&O's Detail    19 Dec 2023 07:01  -  20 Dec 2023 07:00  --------------------------------------------------------  IN:    Fat Emulsion (Fish Oil &amp; Plant Based) 20% Infusion: 270.4 mL    IV PiggyBack: 500 mL    Lactated Ringers Bolus: 1000 mL    TPN (Total Parenteral Nutrition): 1697 mL  Total IN: 3467.4 mL    OUT:    Drain (mL): 1400 mL    Drain (mL): 975 mL    Drain (mL): 75 mL    Fat Emulsion (Fish Oil &amp; Plant Based) 20% Infusion: 0 mL    Voided (mL): 1450 mL  Total OUT: 3900 mL    Total NET: -432.6 mL          LABS:                        7.8    6.65  )-----------( 246      ( 20 Dec 2023 05:30 )             24.3     12-20    135  |  102  |  41<H>  ----------------------------<  127<H>  4.5   |  26  |  1.58<H>    Ca    8.3<L>      20 Dec 2023 05:30  Phos  3.7     12-20  Mg     2.1     12-20    TPro  8.6<H>  /  Alb  2.2<L>  /  TBili  7.6<H>  /  DBili  5.1<H>  /  AST  119<H>  /  ALT  76<H>  /  AlkPhos  129<H>  12-20      Urinalysis Basic - ( 20 Dec 2023 05:30 )    Color: x / Appearance: x / SG: x / pH: x  Gluc: 127 mg/dL / Ketone: x  / Bili: x / Urobili: x   Blood: x / Protein: x / Nitrite: x   Leuk Esterase: x / RBC: x / WBC x   Sq Epi: x / Non Sq Epi: x / Bacteria: x        RADIOLOGY & ADDITIONAL STUDIES: INTERVAL HPI/OVERNIGHT EVENTS: Given ativan 0.25 per Dr. IZABELA Siddiqi neg 600mls this am. Given 500 NS bolus     STATUS POST: 6/27: Laparoscopic lysis of adhesions, converted to open lysis of adhesions, SBR x 3, temporary abdominal closure, 5L cryst, 1L 5% alb, 3 pRBC, 1 FFP, 1 plts  6/28: ex lap, removal of abthera, ileocolic resection, small bowel anastamosis, , 1.6 crystalloid, 250 5%, 175 uop   7/3: IR Gendel drained perihepatic aspiration of serous fluid.   7/5: RTOR exlap, washout, ileocolic resection with end ileostomy, blow hole colostomy, fistula, red rubber from ileostomy to small bowel anastomosis; vicryl bridging mesh; R JOYCE below ileostomy, L JOYCE at small bowel enterotomy repair; 500 LR, 500 5% albumin, 3u PRBC, 2 FFP, 400 UOP,   9/11: s/p perc cony     SUBJECTIVE: Patient seen and examined bedside with chief resident on AM rounds. He reports feeling well today. He is tolerating his diet and has been out of bed ambulating. Denies cp, sob, nausea, emesis, or fevers.     MEDICATIONS  (STANDING):  chlorhexidine 2% Cloths 1 Application(s) Topical <User Schedule>  cholestyramine Powder (Sugar-Free) 4 Gram(s) Oral daily  epoetin matt-epbx (RETACRIT) Injectable 37427 Unit(s) SubCutaneous every 7 days  ergocalciferol 57933 Unit(s) Oral <User Schedule>  glucagon  Injectable 1 milliGRAM(s) IntraMuscular once  heparin   Injectable 5000 Unit(s) SubCutaneous every 8 hours  labetalol Injectable 10 milliGRAM(s) IV Push every 6 hours  levothyroxine Injectable 30 MICROGram(s) IV Push at bedtime  lipid, fat emulsion (Fish Oil and Plant Based) 20% Infusion 0.54 Gm/kG/Day (20.83 mL/Hr) IV Continuous <Continuous>  Parenteral Nutrition - Adult 1 Each TPN Continuous <Continuous>  sodium chloride 0.9% Bolus 500 milliLiter(s) IV Bolus once  ursodiol Suspension 300 milliGRAM(s) Oral every 8 hours  venlafaxine XR. 150 milliGRAM(s) Oral daily    MEDICATIONS  (PRN):  hydrALAZINE Injectable 10 milliGRAM(s) IV Push every 6 hours PRN SBP >170  lidocaine 2% Viscous 10 milliLiter(s) Swish and Spit every 12 hours PRN tongue pain  LORazepam   Injectable 0.5 milliGRAM(s) IV Push at bedtime PRN Anxiety  ondansetron Injectable 4 milliGRAM(s) IV Push every 6 hours PRN Nausea and/or Vomiting      Vital Signs Last 24 Hrs  T(C): 36.5 (20 Dec 2023 06:17), Max: 36.7 (19 Dec 2023 09:00)  T(F): 97.7 (20 Dec 2023 06:17), Max: 98.1 (19 Dec 2023 09:00)  HR: 81 (20 Dec 2023 06:00) (70 - 98)  BP: 155/70 (20 Dec 2023 06:00) (109/58 - 168/69)  BP(mean): 100 (20 Dec 2023 06:00) (80 - 104)  RR: 22 (20 Dec 2023 06:00) (12 - 39)  SpO2: 95% (20 Dec 2023 06:00) (93% - 100%)    Parameters below as of 20 Dec 2023 06:00  Patient On (Oxygen Delivery Method): room air      PHYSICAL EXAM:  Constitutional: A&Ox3, sitting up comfortably in bed, +jaundice  Respiratory: non labored breathing, no respiratory distress  Cardiovascular: NSR, RRR  Gastrointestinal: Abdomen soft, NT/ND. Fistula with thick yellow output. Stoma pink and patent. Ostomy with stool and gas. Perc cony drain bilious.   Genitourinary: voiding  Extremities: wwp, no calf tenderness or edema, +SCDs      I&O's Detail    19 Dec 2023 07:01  -  20 Dec 2023 07:00  --------------------------------------------------------  IN:    Fat Emulsion (Fish Oil &amp; Plant Based) 20% Infusion: 270.4 mL    IV PiggyBack: 500 mL    Lactated Ringers Bolus: 1000 mL    TPN (Total Parenteral Nutrition): 1697 mL  Total IN: 3467.4 mL    OUT:    Drain (mL): 1400 mL    Drain (mL): 975 mL    Drain (mL): 75 mL    Fat Emulsion (Fish Oil &amp; Plant Based) 20% Infusion: 0 mL    Voided (mL): 1450 mL  Total OUT: 3900 mL    Total NET: -432.6 mL          LABS:                        7.8    6.65  )-----------( 246      ( 20 Dec 2023 05:30 )             24.3     12-20    135  |  102  |  41<H>  ----------------------------<  127<H>  4.5   |  26  |  1.58<H>    Ca    8.3<L>      20 Dec 2023 05:30  Phos  3.7     12-20  Mg     2.1     12-20    TPro  8.6<H>  /  Alb  2.2<L>  /  TBili  7.6<H>  /  DBili  5.1<H>  /  AST  119<H>  /  ALT  76<H>  /  AlkPhos  129<H>  12-20      Urinalysis Basic - ( 20 Dec 2023 05:30 )    Color: x / Appearance: x / SG: x / pH: x  Gluc: 127 mg/dL / Ketone: x  / Bili: x / Urobili: x   Blood: x / Protein: x / Nitrite: x   Leuk Esterase: x / RBC: x / WBC x   Sq Epi: x / Non Sq Epi: x / Bacteria: x        RADIOLOGY & ADDITIONAL STUDIES:

## 2023-12-20 NOTE — PROGRESS NOTE ADULT - SUBJECTIVE AND OBJECTIVE BOX
SUBJECTIVE:    Doing well, endorses that he had a good nights sleep last night ( after receiving ativan .25)    Hemodynamically stable, normal rate and rythm, afebrile    Voiding freely and as usual - got 500cc bolus yesterday, now net negative 200  Jaundice, which is worsening (bili 7.6 unchanged from prior)      MEDICATIONS  (STANDING):  chlorhexidine 2% Cloths 1 Application(s) Topical <User Schedule>  cholestyramine Powder (Sugar-Free) 4 Gram(s) Oral daily  epoetin matt-epbx (RETACRIT) Injectable 91153 Unit(s) SubCutaneous every 7 days  ergocalciferol 94921 Unit(s) Oral <User Schedule>  glucagon  Injectable 1 milliGRAM(s) IntraMuscular once  heparin   Injectable 5000 Unit(s) SubCutaneous every 8 hours  labetalol Injectable 10 milliGRAM(s) IV Push every 6 hours  levothyroxine Injectable 30 MICROGram(s) IV Push at bedtime  lipid, fat emulsion (Fish Oil and Plant Based) 20% Infusion 0.53 Gm/kG/Day (20.83 mL/Hr) IV Continuous <Continuous>  Parenteral Nutrition - Adult 1 Each TPN Continuous <Continuous>  Parenteral Nutrition - Adult 1 Each TPN Continuous <Continuous>  ursodiol Suspension 300 milliGRAM(s) Oral every 8 hours  venlafaxine XR. 150 milliGRAM(s) Oral daily    MEDICATIONS  (PRN):  hydrALAZINE Injectable 10 milliGRAM(s) IV Push every 6 hours PRN SBP >170  lidocaine 2% Viscous 10 milliLiter(s) Swish and Spit every 12 hours PRN tongue pain  LORazepam   Injectable 0.5 milliGRAM(s) IV Push at bedtime PRN Anxiety  ondansetron Injectable 4 milliGRAM(s) IV Push every 6 hours PRN Nausea and/or Vomiting      Vital Signs Last 24 Hrs  T(C): 36.8 (20 Dec 2023 09:00), Max: 36.8 (20 Dec 2023 09:00)  T(F): 98.2 (20 Dec 2023 09:00), Max: 98.2 (20 Dec 2023 09:00)  HR: 79 (20 Dec 2023 12:00) (70 - 98)  BP: 126/59 (20 Dec 2023 12:00) (126/59 - 168/69)  BP(mean): 85 (20 Dec 2023 12:00) (78 - 104)  RR: 22 (20 Dec 2023 12:00) (12 - 39)  SpO2: 100% (20 Dec 2023 12:00) (93% - 100%)    Parameters below as of 20 Dec 2023 12:00  Patient On (Oxygen Delivery Method): room air    Physical Exam:  Neurological: AAOx3, CNII-XII intact,  strength 5/5 b/l  ENT: mucus membrane moist  Cardiovascular: RRR  Respiratory: CTA  Gastrointestinal: soft, NT, ND, BS+, fistula thickish yellowish formed output, has punctate bleeding from inferior aspect of stoma on granular tissue bed--surgicell placed and bleeding controlled. perc cony bilious,  Extremities: warm, no dependent edema  Vascular: no cyanosis/erythema  Skin: no rashes  MSK: no joint swelling.    I&O's Summary    19 Dec 2023 07:01  -  20 Dec 2023 07:00  --------------------------------------------------------  IN: 3588.4 mL / OUT: 3900 mL / NET: -311.6 mL    20 Dec 2023 07:01  -  20 Dec 2023 13:40  --------------------------------------------------------  IN: 362 mL / OUT: 600 mL / NET: -238 mL        LABS:                        7.8    6.65  )-----------( 246      ( 20 Dec 2023 05:30 )             24.3     12-20    135  |  102  |  41<H>  ----------------------------<  127<H>  4.5   |  26  |  1.58<H>    Ca    8.3<L>      20 Dec 2023 05:30  Phos  3.7     12-20  Mg     2.1     12-20    TPro  8.6<H>  /  Alb  2.2<L>  /  TBili  7.6<H>  /  DBili  5.1<H>  /  AST  119<H>  /  ALT  76<H>  /  AlkPhos  129<H>  12-20      Urinalysis Basic - ( 20 Dec 2023 05:30 )    Color: x / Appearance: x / SG: x / pH: x  Gluc: 127 mg/dL / Ketone: x  / Bili: x / Urobili: x   Blood: x / Protein: x / Nitrite: x   Leuk Esterase: x / RBC: x / WBC x   Sq Epi: x / Non Sq Epi: x / Bacteria: x      CAPILLARY BLOOD GLUCOSE        LIVER FUNCTIONS - ( 20 Dec 2023 05:30 )  Alb: 2.2 g/dL / Pro: 8.6 g/dL / ALK PHOS: 129 U/L / ALT: 76 U/L / AST: 119 U/L / GGT: x             RADIOLOGY & ADDITIONAL STUDIES:   SUBJECTIVE:    Doing well, endorses that he had a good nights sleep last night ( after receiving ativan .25)    Hemodynamically stable, normal rate and rythm, afebrile    Voiding freely and as usual - got 500cc bolus yesterday, now net negative 200  Jaundice, which is worsening (bili 7.6 unchanged from prior)      MEDICATIONS  (STANDING):  chlorhexidine 2% Cloths 1 Application(s) Topical <User Schedule>  cholestyramine Powder (Sugar-Free) 4 Gram(s) Oral daily  epoetin matt-epbx (RETACRIT) Injectable 38794 Unit(s) SubCutaneous every 7 days  ergocalciferol 40140 Unit(s) Oral <User Schedule>  glucagon  Injectable 1 milliGRAM(s) IntraMuscular once  heparin   Injectable 5000 Unit(s) SubCutaneous every 8 hours  labetalol Injectable 10 milliGRAM(s) IV Push every 6 hours  levothyroxine Injectable 30 MICROGram(s) IV Push at bedtime  lipid, fat emulsion (Fish Oil and Plant Based) 20% Infusion 0.53 Gm/kG/Day (20.83 mL/Hr) IV Continuous <Continuous>  Parenteral Nutrition - Adult 1 Each TPN Continuous <Continuous>  Parenteral Nutrition - Adult 1 Each TPN Continuous <Continuous>  ursodiol Suspension 300 milliGRAM(s) Oral every 8 hours  venlafaxine XR. 150 milliGRAM(s) Oral daily    MEDICATIONS  (PRN):  hydrALAZINE Injectable 10 milliGRAM(s) IV Push every 6 hours PRN SBP >170  lidocaine 2% Viscous 10 milliLiter(s) Swish and Spit every 12 hours PRN tongue pain  LORazepam   Injectable 0.5 milliGRAM(s) IV Push at bedtime PRN Anxiety  ondansetron Injectable 4 milliGRAM(s) IV Push every 6 hours PRN Nausea and/or Vomiting      Vital Signs Last 24 Hrs  T(C): 36.8 (20 Dec 2023 09:00), Max: 36.8 (20 Dec 2023 09:00)  T(F): 98.2 (20 Dec 2023 09:00), Max: 98.2 (20 Dec 2023 09:00)  HR: 79 (20 Dec 2023 12:00) (70 - 98)  BP: 126/59 (20 Dec 2023 12:00) (126/59 - 168/69)  BP(mean): 85 (20 Dec 2023 12:00) (78 - 104)  RR: 22 (20 Dec 2023 12:00) (12 - 39)  SpO2: 100% (20 Dec 2023 12:00) (93% - 100%)    Parameters below as of 20 Dec 2023 12:00  Patient On (Oxygen Delivery Method): room air    Physical Exam:  Neurological: AAOx3, CNII-XII intact,  strength 5/5 b/l  ENT: mucus membrane moist  Cardiovascular: RRR  Respiratory: CTA  Gastrointestinal: soft, NT, ND, BS+, fistula thickish yellowish formed output, has punctate bleeding from inferior aspect of stoma on granular tissue bed--surgicell placed and bleeding controlled. perc cony bilious,  Extremities: warm, no dependent edema  Vascular: no cyanosis/erythema  Skin: no rashes  MSK: no joint swelling.    I&O's Summary    19 Dec 2023 07:01  -  20 Dec 2023 07:00  --------------------------------------------------------  IN: 3588.4 mL / OUT: 3900 mL / NET: -311.6 mL    20 Dec 2023 07:01  -  20 Dec 2023 13:40  --------------------------------------------------------  IN: 362 mL / OUT: 600 mL / NET: -238 mL        LABS:                        7.8    6.65  )-----------( 246      ( 20 Dec 2023 05:30 )             24.3     12-20    135  |  102  |  41<H>  ----------------------------<  127<H>  4.5   |  26  |  1.58<H>    Ca    8.3<L>      20 Dec 2023 05:30  Phos  3.7     12-20  Mg     2.1     12-20    TPro  8.6<H>  /  Alb  2.2<L>  /  TBili  7.6<H>  /  DBili  5.1<H>  /  AST  119<H>  /  ALT  76<H>  /  AlkPhos  129<H>  12-20      Urinalysis Basic - ( 20 Dec 2023 05:30 )    Color: x / Appearance: x / SG: x / pH: x  Gluc: 127 mg/dL / Ketone: x  / Bili: x / Urobili: x   Blood: x / Protein: x / Nitrite: x   Leuk Esterase: x / RBC: x / WBC x   Sq Epi: x / Non Sq Epi: x / Bacteria: x      CAPILLARY BLOOD GLUCOSE        LIVER FUNCTIONS - ( 20 Dec 2023 05:30 )  Alb: 2.2 g/dL / Pro: 8.6 g/dL / ALK PHOS: 129 U/L / ALT: 76 U/L / AST: 119 U/L / GGT: x             RADIOLOGY & ADDITIONAL STUDIES:

## 2023-12-20 NOTE — PROGRESS NOTE ADULT - ASSESSMENT
78M w PMH Crohn's, AFib/Flutter s/p DCCVs on amiodarone, remote ileocectomy and open appendectomy. Admitted (6/23) for SBO vs Crohns flare, s/p NGT decompression and s/p lap converted to open TRE, SBR x 3, left in discontinuity with abthera vac on (6/27), RTOR for ileocolic resection, small bowel anastomosis, and abdominal wall closure on (6/28), c/b fluid collection s/p IR aspiration of perihepatic fluid on (7/3), c/b wound dehiscence s/p RTOR exlap, washout, ileocolic resection with end ileostomy, blow hole colostomy, red rubber from ileostomy to small bowel anastomosis; vicryl bridging mesh on (7/5) transferred to SICU postoperatively for hemodynamic monitoring, with hospital course complicated by periods of severe ELVI and hyponatremia, which resolved but stepped back up for to SICU on (9/10) for acute AMS, intermittent hypoglycemia, AFib with RVR. Percutaneous Cholecystostomy placed on (9/11) for enlarged GB, PCT capped 10/5 and uncapped 10/25 for hyperbilirubinemia, Right brachial DVT, left basillic and cephalic superficial thrombus on duplex 11/2, CT 11/14 with ALDEN ground glass opacity of unclear etiology, completed empiric 7day cefepime course 11/22, rising T-bili of unclear etilogy, now w resolved candida glabrata fungemia, ELVI. Requiring bolus PRN for ostomy/ECF output.    NEURO: AMS resolved, likely septic encephalopathy, EEG neg. Hx of depression - cont Effexor. Nausea: Zofran PRN. Anxiety: Lorazepam PRN.  CV: Hx Afib/flutter: s/p DCCV, Amio stopped due to persistent transaminitis. Continue labetalol IV 10q6h  with hold parameters. Hx HLD: Lipitor held given high LFTs. TTE  (7/18) - PASP 64mmHg, EF 65-70%, Hx HTN - stopped PO Norvasc as unlikely absorbing, holding Losartan. BP borderline - continue hydralazine PRN.   PULM: Atelectasis/dyspnea improved clinically: lower to 1 hr of BiPAP every 12hrs of nasal canula or room air. Encourage OOB and IS.  CT from 11/14 with ALDEN ground glass opacity of unclear etiology. COVID negative. RVP negative. completed 7d empiric cefepime 11/22 to cover for potential PNA.   GI/FEN: Low res, low fat, high protein diet. Cont Ensure max x1/day. Folate, thiamine. fungemia likely CLABSI. PICC replaced on 12/4 and continue TPN qd, lipids qd.  High output EC fistula: cont Octreotide & Cholestyramine 10/18. Transaminitis elevated T bili of unclear origin, s/p Perc Deb on 9/11; MRCP 10/30 no obstruction, seen by Hepatology started on ursodiol, RUQ US 11/25 CBD 5mm, hepatic vessels patent Repeat. MRCP neg. Monitor drainage from fistula, bolus as needed to keep I&O even.    : Voids. ELVI, Cr trending down. stopped famotidine and half Effexor dose given renal dysfunction. Caspo unlikely etiology of ELVI per ID. s/p brief bicarb gtt.  MARLO Duplex and RP US unremarkable, CK wnl.  f/u renal recs. UA/Ulytes ordered.  ENDO: Hx hypothyroid: IV Synthroid, TSH low on 11/30, decreased synthroid dose, repeat TSH 12/6 wnl.  ID: BCx 11/22 grew candida glabrata, likely CLABSI. ID consulted, s/p Caspo, switched to Fluconazole (12/1-12/9). Repeat surveillance blood cx NGTD. Ophthalmology evaulated - normal ocular exam. Echo no vegetation. PICC replaced on 12/4.  Recent MRSA swab negative, RVP panel negative, COVID negative. Cont Nystatin powder // PREVIOUSLY  caspofungin (11/24-12/1). completed empiric 7days cefepime (11/15-11/22), had C. tertium, Lactobacillis from his IR cx 7/3, and candida albicans, lactobacillus, vanc sensitive E faecium, vanc resistant E gallinarum, vanc resistant E casseliflavus, lactobacillus from his OR cx 7/5. Completed course of abx with Imipenem (9/10-9/15). Imipenem (8/26--) imipenem (6/30-7/12, 7/23-7/30), Dapto (6/30-7/5 and 7/23-7/24). Empiric dapto (8/23-24) and cefepime (8/23-24).   PPX: SCDs, SQH, was on Therapeutic lovenox bid for R brachial vein DVT, but will hold off on hep gtt since repeat US Duplex negative.  HEME: Anemia - continue Epogen weekly. S/p Iron Sucrose 200 qd x 3 days for chronic anemia.  LINES: ZAHEER Pinto PICC (12/4 - ) will plan to switch PICC once a month // DC: LUE PICC (9/1-11/1 ), RUE PICC: (11/1-11/24)   WOUNDS/DRAINS: Abdominal wound and fistula with wound manager in place dressing change Tuesday and Friday. RLQ Ostomy. IR perc deb tube. // DC: L Clay drain.  PT: Ambulate as tolerated OOB to chair daily.  DISPO: SICU due to complicated hospital course.

## 2023-12-20 NOTE — PROGRESS NOTE ADULT - SUBJECTIVE AND OBJECTIVE BOX
INTERVAL/OVERNIGHT EVENTS: net neg 600cc, gave NS bolus 500 cc.     SUBJECTIVE: This AM, resting in bed but states breathing feels difficult because there is a tight binder on his abdomen. No N/V or CP. Voids without issues.     Update noon, binder released and pt sitting up in chair, states breathing feels more comfortable.    Neurologic Medications  LORazepam   Injectable 0.5 milliGRAM(s) IV Push at bedtime PRN Anxiety  ondansetron Injectable 4 milliGRAM(s) IV Push every 6 hours PRN Nausea and/or Vomiting  venlafaxine XR. 150 milliGRAM(s) Oral daily    Cardiovascular Medications  hydrALAZINE Injectable 10 milliGRAM(s) IV Push every 6 hours PRN SBP >170  labetalol Injectable 10 milliGRAM(s) IV Push every 6 hours    Gastrointestinal Medications  ergocalciferol 67170 Unit(s) Oral <User Schedule>  lipid, fat emulsion (Fish Oil and Plant Based) 20% Infusion 0.53 Gm/kG/Day IV Continuous <Continuous>  Parenteral Nutrition - Adult 1 Each TPN Continuous <Continuous>  ursodiol Suspension 300 milliGRAM(s) Oral every 8 hours    Hematologic/Oncologic Medications  epoetin matt-epbx (RETACRIT) Injectable 12507 Unit(s) SubCutaneous every 7 days  heparin   Injectable 5000 Unit(s) SubCutaneous every 8 hours    Endocrine/Metabolic Medications  cholestyramine Powder (Sugar-Free) 4 Gram(s) Oral daily  glucagon  Injectable 1 milliGRAM(s) IntraMuscular once  levothyroxine Injectable 30 MICROGram(s) IV Push at bedtime    Topical/Other Medications  chlorhexidine 2% Cloths 1 Application(s) Topical <User Schedule>    MEDICATIONS  (PRN):  hydrALAZINE Injectable 10 milliGRAM(s) IV Push every 6 hours PRN SBP >170  lidocaine 2% Viscous 10 milliLiter(s) Swish and Spit every 12 hours PRN tongue pain  LORazepam   Injectable 0.5 milliGRAM(s) IV Push at bedtime PRN Anxiety  ondansetron Injectable 4 milliGRAM(s) IV Push every 6 hours PRN Nausea and/or Vomiting    I&O's Detail    19 Dec 2023 07:01  -  20 Dec 2023 07:00  --------------------------------------------------------  IN:    Fat Emulsion (Fish Oil &amp; Plant Based) 20% Infusion: 270.4 mL    IV PiggyBack: 500 mL    Lactated Ringers Bolus: 1000 mL    TPN (Total Parenteral Nutrition): 1818 mL  Total IN: 3588.4 mL    OUT:    Drain (mL): 1400 mL    Drain (mL): 975 mL    Drain (mL): 75 mL    Fat Emulsion (Fish Oil &amp; Plant Based) 20% Infusion: 0 mL    Voided (mL): 1450 mL  Total OUT: 3900 mL    Total NET: -311.6 mL    20 Dec 2023 07:01  -  20 Dec 2023 13:57  --------------------------------------------------------  IN:    Oral Fluid: 120 mL    TPN (Total Parenteral Nutrition): 242 mL  Total IN: 362 mL    OUT:    Drain (mL): 350 mL    Fat Emulsion (Fish Oil &amp; Plant Based) 20% Infusion: 0 mL    Voided (mL): 250 mL  Total OUT: 600 mL    Total NET: -238 mL    Vital Signs Last 24 Hrs  T(C): 36.8 (20 Dec 2023 09:00), Max: 36.8 (20 Dec 2023 09:00)  T(F): 98.2 (20 Dec 2023 09:00), Max: 98.2 (20 Dec 2023 09:00)  HR: 81 (20 Dec 2023 13:00) (70 - 98)  BP: 134/63 (20 Dec 2023 13:00) (126/59 - 168/69)  BP(mean): 91 (20 Dec 2023 13:00) (78 - 104)  RR: 22 (20 Dec 2023 13:00) (12 - 39)  SpO2: 97% (20 Dec 2023 13:00) (93% - 100%)    Parameters below as of 20 Dec 2023 13:00  Patient On (Oxygen Delivery Method): room air    GENERAL: NAD, resting comfortably in bed, jaundiced  HEENT: NCAT, mouth dry  C/V: Normal rate, normal peripheral perfusion, no murmurs  PULM: Nonlabored breathing, no respiratory distress, CTA b/l, used accessory muscles in AM, but improved after releasing binder and OOBTC.  ABD: Soft, ND, NT, no rebound tenderness, no guarding, PCT bilious, ostomy site w bile-tinge fluid/food contents, ECF site w bile-venancio fluid contents.  EXTREM: WWP, mild edema, SCDs in place  NEURO: No focal deficits    LABS:                        7.8    6.65  )-----------( 246      ( 20 Dec 2023 05:30 )             24.3     12-20    135  |  102  |  41<H>  ----------------------------<  127<H>  4.5   |  26  |  1.58<H>    Ca    8.3<L>      20 Dec 2023 05:30  Phos  3.7     12-20  Mg     2.1     12-20    TPro  8.6<H>  /  Alb  2.2<L>  /  TBili  7.6<H>  /  DBili  5.1<H>  /  AST  119<H>  /  ALT  76<H>  /  AlkPhos  129<H>  12-20    Urinalysis Basic - ( 20 Dec 2023 05:30 )    Color: x / Appearance: x / SG: x / pH: x  Gluc: 127 mg/dL / Ketone: x  / Bili: x / Urobili: x   Blood: x / Protein: x / Nitrite: x   Leuk Esterase: x / RBC: x / WBC x   Sq Epi: x / Non Sq Epi: x / Bacteria: x INTERVAL/OVERNIGHT EVENTS: net neg 600cc, gave NS bolus 500 cc.     SUBJECTIVE: This AM, resting in bed but states breathing feels difficult because there is a tight binder on his abdomen. No N/V or CP. Voids without issues.     Update noon, binder released and pt sitting up in chair, states breathing feels more comfortable.    Neurologic Medications  LORazepam   Injectable 0.5 milliGRAM(s) IV Push at bedtime PRN Anxiety  ondansetron Injectable 4 milliGRAM(s) IV Push every 6 hours PRN Nausea and/or Vomiting  venlafaxine XR. 150 milliGRAM(s) Oral daily    Cardiovascular Medications  hydrALAZINE Injectable 10 milliGRAM(s) IV Push every 6 hours PRN SBP >170  labetalol Injectable 10 milliGRAM(s) IV Push every 6 hours    Gastrointestinal Medications  ergocalciferol 20723 Unit(s) Oral <User Schedule>  lipid, fat emulsion (Fish Oil and Plant Based) 20% Infusion 0.53 Gm/kG/Day IV Continuous <Continuous>  Parenteral Nutrition - Adult 1 Each TPN Continuous <Continuous>  ursodiol Suspension 300 milliGRAM(s) Oral every 8 hours    Hematologic/Oncologic Medications  epoetin matt-epbx (RETACRIT) Injectable 11432 Unit(s) SubCutaneous every 7 days  heparin   Injectable 5000 Unit(s) SubCutaneous every 8 hours    Endocrine/Metabolic Medications  cholestyramine Powder (Sugar-Free) 4 Gram(s) Oral daily  glucagon  Injectable 1 milliGRAM(s) IntraMuscular once  levothyroxine Injectable 30 MICROGram(s) IV Push at bedtime    Topical/Other Medications  chlorhexidine 2% Cloths 1 Application(s) Topical <User Schedule>    MEDICATIONS  (PRN):  hydrALAZINE Injectable 10 milliGRAM(s) IV Push every 6 hours PRN SBP >170  lidocaine 2% Viscous 10 milliLiter(s) Swish and Spit every 12 hours PRN tongue pain  LORazepam   Injectable 0.5 milliGRAM(s) IV Push at bedtime PRN Anxiety  ondansetron Injectable 4 milliGRAM(s) IV Push every 6 hours PRN Nausea and/or Vomiting    I&O's Detail    19 Dec 2023 07:01  -  20 Dec 2023 07:00  --------------------------------------------------------  IN:    Fat Emulsion (Fish Oil &amp; Plant Based) 20% Infusion: 270.4 mL    IV PiggyBack: 500 mL    Lactated Ringers Bolus: 1000 mL    TPN (Total Parenteral Nutrition): 1818 mL  Total IN: 3588.4 mL    OUT:    Drain (mL): 1400 mL    Drain (mL): 975 mL    Drain (mL): 75 mL    Fat Emulsion (Fish Oil &amp; Plant Based) 20% Infusion: 0 mL    Voided (mL): 1450 mL  Total OUT: 3900 mL    Total NET: -311.6 mL    20 Dec 2023 07:01  -  20 Dec 2023 13:57  --------------------------------------------------------  IN:    Oral Fluid: 120 mL    TPN (Total Parenteral Nutrition): 242 mL  Total IN: 362 mL    OUT:    Drain (mL): 350 mL    Fat Emulsion (Fish Oil &amp; Plant Based) 20% Infusion: 0 mL    Voided (mL): 250 mL  Total OUT: 600 mL    Total NET: -238 mL    Vital Signs Last 24 Hrs  T(C): 36.8 (20 Dec 2023 09:00), Max: 36.8 (20 Dec 2023 09:00)  T(F): 98.2 (20 Dec 2023 09:00), Max: 98.2 (20 Dec 2023 09:00)  HR: 81 (20 Dec 2023 13:00) (70 - 98)  BP: 134/63 (20 Dec 2023 13:00) (126/59 - 168/69)  BP(mean): 91 (20 Dec 2023 13:00) (78 - 104)  RR: 22 (20 Dec 2023 13:00) (12 - 39)  SpO2: 97% (20 Dec 2023 13:00) (93% - 100%)    Parameters below as of 20 Dec 2023 13:00  Patient On (Oxygen Delivery Method): room air    GENERAL: NAD, resting comfortably in bed, jaundiced  HEENT: NCAT, mouth dry  C/V: Normal rate, normal peripheral perfusion, no murmurs  PULM: Nonlabored breathing, no respiratory distress, CTA b/l, used accessory muscles in AM, but improved after releasing binder and OOBTC.  ABD: Soft, ND, NT, no rebound tenderness, no guarding, PCT bilious, ostomy site w bile-tinge fluid/food contents, ECF site w bile-venancio fluid contents.  EXTREM: WWP, mild edema, SCDs in place  NEURO: No focal deficits    LABS:                        7.8    6.65  )-----------( 246      ( 20 Dec 2023 05:30 )             24.3     12-20    135  |  102  |  41<H>  ----------------------------<  127<H>  4.5   |  26  |  1.58<H>    Ca    8.3<L>      20 Dec 2023 05:30  Phos  3.7     12-20  Mg     2.1     12-20    TPro  8.6<H>  /  Alb  2.2<L>  /  TBili  7.6<H>  /  DBili  5.1<H>  /  AST  119<H>  /  ALT  76<H>  /  AlkPhos  129<H>  12-20    Urinalysis Basic - ( 20 Dec 2023 05:30 )    Color: x / Appearance: x / SG: x / pH: x  Gluc: 127 mg/dL / Ketone: x  / Bili: x / Urobili: x   Blood: x / Protein: x / Nitrite: x   Leuk Esterase: x / RBC: x / WBC x   Sq Epi: x / Non Sq Epi: x / Bacteria: x

## 2023-12-21 NOTE — PROGRESS NOTE ADULT - SUBJECTIVE AND OBJECTIVE BOX
SUBJECTIVE:  Pt seen and examined at bedside this am by surgery team. Resting comfortably. Denies f/n/v/cp/sob.    MEDICATIONS  (STANDING):  chlorhexidine 2% Cloths 1 Application(s) Topical <User Schedule>  cholestyramine Powder (Sugar-Free) 4 Gram(s) Oral daily  epoetin matt-epbx (RETACRIT) Injectable 50147 Unit(s) SubCutaneous every 7 days  ergocalciferol 28736 Unit(s) Oral <User Schedule>  glucagon  Injectable 1 milliGRAM(s) IntraMuscular once  heparin   Injectable 5000 Unit(s) SubCutaneous every 8 hours  labetalol Injectable 10 milliGRAM(s) IV Push every 6 hours  levothyroxine Injectable 30 MICROGram(s) IV Push at bedtime  lipid, fat emulsion (Fish Oil and Plant Based) 20% Infusion 0.5359 Gm/kG/Day (20.8 mL/Hr) IV Continuous <Continuous>  Parenteral Nutrition - Adult 1 Each TPN Continuous <Continuous>  Parenteral Nutrition - Adult 1 Each TPN Continuous <Continuous>  ursodiol Suspension 300 milliGRAM(s) Oral every 8 hours  venlafaxine XR. 150 milliGRAM(s) Oral daily    MEDICATIONS  (PRN):  hydrALAZINE Injectable 10 milliGRAM(s) IV Push every 6 hours PRN SBP >170  lidocaine 2% Viscous 10 milliLiter(s) Swish and Spit every 12 hours PRN tongue pain  LORazepam   Injectable 0.5 milliGRAM(s) IV Push at bedtime PRN Anxiety  ondansetron Injectable 4 milliGRAM(s) IV Push every 6 hours PRN Nausea and/or Vomiting      Vital Signs Last 24 Hrs  T(C): 36.8 (21 Dec 2023 09:21), Max: 36.8 (21 Dec 2023 09:21)  T(F): 98.2 (21 Dec 2023 09:21), Max: 98.2 (21 Dec 2023 09:21)  HR: 79 (21 Dec 2023 17:00) (61 - 83)  BP: 150/66 (21 Dec 2023 17:00) (107/54 - 179/74)  BP(mean): 95 (21 Dec 2023 17:00) (72 - 116)  RR: 34 (21 Dec 2023 17:00) (18 - 34)  SpO2: 98% (21 Dec 2023 17:00) (94% - 100%)    Parameters below as of 21 Dec 2023 18:00  Patient On (Oxygen Delivery Method): room air    PHYSICAL EXAM:    Constitutional: A&Ox3, sitting up comfortably in bed, +jaundice  Respiratory: non labored breathing, no respiratory distress  Cardiovascular: NSR, RRR  Gastrointestinal: Abdomen soft, NT/ND. no R/G. PCT bilious, ostomy site w/ bilious OP, fistula w/ bilious OP  Extremities: wwp, no calf tenderness or edema, +SCDs      I&O's Summary    20 Dec 2023 07:01  -  21 Dec 2023 07:00  --------------------------------------------------------  IN: 3058.6 mL / OUT: 3850 mL / NET: -791.4 mL    21 Dec 2023 07:01  -  21 Dec 2023 17:38  --------------------------------------------------------  IN: 1264.3 mL / OUT: 1350 mL / NET: -85.7 mL        LABS:                        7.8    6.65  )-----------( 246      ( 20 Dec 2023 05:30 )             24.3     12-20    135  |  102  |  41<H>  ----------------------------<  127<H>  4.5   |  26  |  1.58<H>    Ca    8.3<L>      20 Dec 2023 05:30  Phos  3.7     12-20  Mg     2.1     12-20    TPro  8.6<H>  /  Alb  2.2<L>  /  TBili  7.6<H>  /  DBili  5.1<H>  /  AST  119<H>  /  ALT  76<H>  /  AlkPhos  129<H>  12-20      Urinalysis Basic - ( 20 Dec 2023 14:19 )    Color: Dark Yellow / Appearance: Clear / S.016 / pH: x  Gluc: x / Ketone: Negative mg/dL  / Bili: Moderate / Urobili: 1.0 mg/dL   Blood: x / Protein: 30 mg/dL / Nitrite: Negative   Leuk Esterase: Trace / RBC: 0 /HPF / WBC 0 /HPF   Sq Epi: x / Non Sq Epi: 0 /HPF / Bacteria: Negative /HPF      CAPILLARY BLOOD GLUCOSE        LIVER FUNCTIONS - ( 20 Dec 2023 05:30 )  Alb: 2.2 g/dL / Pro: 8.6 g/dL / ALK PHOS: 129 U/L / ALT: 76 U/L / AST: 119 U/L / GGT: x             RADIOLOGY & ADDITIONAL STUDIES:   SUBJECTIVE:  Pt seen and examined at bedside this am by surgery team. Resting comfortably. Denies f/n/v/cp/sob.    MEDICATIONS  (STANDING):  chlorhexidine 2% Cloths 1 Application(s) Topical <User Schedule>  cholestyramine Powder (Sugar-Free) 4 Gram(s) Oral daily  epoetin matt-epbx (RETACRIT) Injectable 36999 Unit(s) SubCutaneous every 7 days  ergocalciferol 80909 Unit(s) Oral <User Schedule>  glucagon  Injectable 1 milliGRAM(s) IntraMuscular once  heparin   Injectable 5000 Unit(s) SubCutaneous every 8 hours  labetalol Injectable 10 milliGRAM(s) IV Push every 6 hours  levothyroxine Injectable 30 MICROGram(s) IV Push at bedtime  lipid, fat emulsion (Fish Oil and Plant Based) 20% Infusion 0.5359 Gm/kG/Day (20.8 mL/Hr) IV Continuous <Continuous>  Parenteral Nutrition - Adult 1 Each TPN Continuous <Continuous>  Parenteral Nutrition - Adult 1 Each TPN Continuous <Continuous>  ursodiol Suspension 300 milliGRAM(s) Oral every 8 hours  venlafaxine XR. 150 milliGRAM(s) Oral daily    MEDICATIONS  (PRN):  hydrALAZINE Injectable 10 milliGRAM(s) IV Push every 6 hours PRN SBP >170  lidocaine 2% Viscous 10 milliLiter(s) Swish and Spit every 12 hours PRN tongue pain  LORazepam   Injectable 0.5 milliGRAM(s) IV Push at bedtime PRN Anxiety  ondansetron Injectable 4 milliGRAM(s) IV Push every 6 hours PRN Nausea and/or Vomiting      Vital Signs Last 24 Hrs  T(C): 36.8 (21 Dec 2023 09:21), Max: 36.8 (21 Dec 2023 09:21)  T(F): 98.2 (21 Dec 2023 09:21), Max: 98.2 (21 Dec 2023 09:21)  HR: 79 (21 Dec 2023 17:00) (61 - 83)  BP: 150/66 (21 Dec 2023 17:00) (107/54 - 179/74)  BP(mean): 95 (21 Dec 2023 17:00) (72 - 116)  RR: 34 (21 Dec 2023 17:00) (18 - 34)  SpO2: 98% (21 Dec 2023 17:00) (94% - 100%)    Parameters below as of 21 Dec 2023 18:00  Patient On (Oxygen Delivery Method): room air    PHYSICAL EXAM:    Constitutional: A&Ox3, sitting up comfortably in bed, +jaundice  Respiratory: non labored breathing, no respiratory distress  Cardiovascular: NSR, RRR  Gastrointestinal: Abdomen soft, NT/ND. no R/G. PCT bilious, ostomy site w/ bilious OP, fistula w/ bilious OP  Extremities: wwp, no calf tenderness or edema, +SCDs      I&O's Summary    20 Dec 2023 07:01  -  21 Dec 2023 07:00  --------------------------------------------------------  IN: 3058.6 mL / OUT: 3850 mL / NET: -791.4 mL    21 Dec 2023 07:01  -  21 Dec 2023 17:38  --------------------------------------------------------  IN: 1264.3 mL / OUT: 1350 mL / NET: -85.7 mL        LABS:                        7.8    6.65  )-----------( 246      ( 20 Dec 2023 05:30 )             24.3     12-20    135  |  102  |  41<H>  ----------------------------<  127<H>  4.5   |  26  |  1.58<H>    Ca    8.3<L>      20 Dec 2023 05:30  Phos  3.7     12-20  Mg     2.1     12-20    TPro  8.6<H>  /  Alb  2.2<L>  /  TBili  7.6<H>  /  DBili  5.1<H>  /  AST  119<H>  /  ALT  76<H>  /  AlkPhos  129<H>  12-20      Urinalysis Basic - ( 20 Dec 2023 14:19 )    Color: Dark Yellow / Appearance: Clear / S.016 / pH: x  Gluc: x / Ketone: Negative mg/dL  / Bili: Moderate / Urobili: 1.0 mg/dL   Blood: x / Protein: 30 mg/dL / Nitrite: Negative   Leuk Esterase: Trace / RBC: 0 /HPF / WBC 0 /HPF   Sq Epi: x / Non Sq Epi: 0 /HPF / Bacteria: Negative /HPF      CAPILLARY BLOOD GLUCOSE        LIVER FUNCTIONS - ( 20 Dec 2023 05:30 )  Alb: 2.2 g/dL / Pro: 8.6 g/dL / ALK PHOS: 129 U/L / ALT: 76 U/L / AST: 119 U/L / GGT: x             RADIOLOGY & ADDITIONAL STUDIES:

## 2023-12-21 NOTE — CHART NOTE - NSCHARTNOTEFT_GEN_A_CORE
Admitting Diagnosis:   Patient is a 78y old  Male who presents with a chief complaint of SBO (20 Dec 2023 13:57)      PAST MEDICAL & SURGICAL HISTORY:  Essential hypertension  Hypertension      Atrial fibrillation  s/p cardioversion 2010 and 2014  Pt. reports 4 DCCV  Now on Amiodarone 200mg PO bid and Eliquis 5mg PO bid  Last DCCV 4 yrs ago at [facility]      Crohn's disease  s/p partial resection of ileum      Hyperlipidemia      Hypothyroidism      History of depression  On Venlafaxine ER 150mg PO bid      Junctional rhythm      H/O knee surgery      History of cataract surgery          Current Nutrition Order: TPN via PICC- 1.9L bag running over 14hrs (135ml/hr), providing 380g Dex, 120g AA, 50g SMOF lipids daily; provides 2272 kcals, 120g protein daily, GIR 4.86 mg/kg/min   PO- Regular diet + 2 LPS per day [provide 100 kcals, 15g protein each], + Ensure Max daily [150 kcals, 20g protein]    PO Intake: Good (%) [   ]  Fair (50-75%) [ x  ] Poor (<25%) [   ]    GI Issues:   12/21: ileostomy output 50cc x 24hrs, abdominal wound/fistula output 1100cc x 24hrs, per cony output 1050cc x 24hrs       12/19: ileostomy output 20cc x 24hrs, abdominal wound/fistula output 1275cc x 24hrs, per cony output 775cc x 24hrs      12/14: ileostomy output 15cc x 24hrs, abdominal wound/fistula output  1230cc x 24hrs, per cony output 1010cc x 24hrs      12/12: ileostomy output 75cc x 24hrs, abdominal wound/fistula output  600cc x 24hrs, per cony output 1125cc x 24hrs      12/5: ileostomy output 35cc x 24hrs, abdominal wound/fistula output  250cc x 24hrs, per cony output 745cc x 24hrs      12/1: ileostomy output 30cc x 24hrs, abdominal wound/fistula output  2400cc x 24hrs, per cony output 550cc x 24hrs     11/27: ileostomy output 50 cc x 24hrs, abdominal wound/fistula output 2175 cc x 24hrs, per cony output 530 cc x 24hrs     11/22: ileostomy output 25cc x 24hrs, abdominal wound/fistula output 2350 cc x 24hrs, per cony output 775cc x 24hrs     11/20: ileostomy output 55cc x 24hrs, abdominal wound/fistula output 2025 cc x 24hrs, per cony output 450cc x 24hrs     11/15:  ileostomy output 65cc x 24hrs, abdominal wound/fistula output 875cc x 24hrs, per cony output 775cc x 24hrs        Pain:    Skin Integrity:    Labs:   12-20    135  |  102  |  41<H>  ----------------------------<  127<H>  4.5   |  26  |  1.58<H>    Ca    8.3<L>      20 Dec 2023 05:30  Phos  3.7     12-20  Mg     2.1     12-20    TPro  8.6<H>  /  Alb  2.2<L>  /  TBili  7.6<H>  /  DBili  5.1<H>  /  AST  119<H>  /  ALT  76<H>  /  AlkPhos  129<H>  12-20    CAPILLARY BLOOD GLUCOSE          Medications:  MEDICATIONS  (STANDING):  chlorhexidine 2% Cloths 1 Application(s) Topical <User Schedule>  cholestyramine Powder (Sugar-Free) 4 Gram(s) Oral daily  epoetin matt-epbx (RETACRIT) Injectable 44381 Unit(s) SubCutaneous every 7 days  ergocalciferol 80020 Unit(s) Oral <User Schedule>  glucagon  Injectable 1 milliGRAM(s) IntraMuscular once  heparin   Injectable 5000 Unit(s) SubCutaneous every 8 hours  labetalol Injectable 10 milliGRAM(s) IV Push every 6 hours  levothyroxine Injectable 30 MICROGram(s) IV Push at bedtime  lipid, fat emulsion (Fish Oil and Plant Based) 20% Infusion 0.53 Gm/kG/Day (20.83 mL/Hr) IV Continuous <Continuous>  Parenteral Nutrition - Adult 1 Each TPN Continuous <Continuous>  ursodiol Suspension 300 milliGRAM(s) Oral every 8 hours  venlafaxine XR. 150 milliGRAM(s) Oral daily    MEDICATIONS  (PRN):  hydrALAZINE Injectable 10 milliGRAM(s) IV Push every 6 hours PRN SBP >170  lidocaine 2% Viscous 10 milliLiter(s) Swish and Spit every 12 hours PRN tongue pain  LORazepam   Injectable 0.5 milliGRAM(s) IV Push at bedtime PRN Anxiety  ondansetron Injectable 4 milliGRAM(s) IV Push every 6 hours PRN Nausea and/or Vomiting      Weight:  Daily     Daily     Weight Change:     Estimated energy needs:     Subjective:     Previous Nutrition Diagnosis:    Active [   ]  Resolved [   ]    If resolved, new PES:     Goal:    Recommendations:    Education:     Risk Level: High [   ] Moderate [   ] Low [   ] Admitting Diagnosis:   Patient is a 78y old  Male who presents with a chief complaint of SBO (20 Dec 2023 13:57)      PAST MEDICAL & SURGICAL HISTORY:  Essential hypertension  Hypertension      Atrial fibrillation  s/p cardioversion 2010 and 2014  Pt. reports 4 DCCV  Now on Amiodarone 200mg PO bid and Eliquis 5mg PO bid  Last DCCV 4 yrs ago at [facility]      Crohn's disease  s/p partial resection of ileum      Hyperlipidemia      Hypothyroidism      History of depression  On Venlafaxine ER 150mg PO bid      Junctional rhythm      H/O knee surgery      History of cataract surgery          Current Nutrition Order: TPN via PICC- 1.9L bag running over 14hrs (135ml/hr), providing 380g Dex, 120g AA, 50g SMOF lipids daily; provides 2272 kcals, 120g protein daily, GIR 4.86 mg/kg/min   PO- Regular diet + 2 LPS per day [provide 100 kcals, 15g protein each], + Ensure Max daily [150 kcals, 20g protein]    PO Intake: Good (%) [   ]  Fair (50-75%) [ x  ] Poor (<25%) [   ]    GI Issues:   12/21: ileostomy output 50cc x 24hrs, abdominal wound/fistula output 1100cc x 24hrs, per cony output 1050cc x 24hrs       12/19: ileostomy output 20cc x 24hrs, abdominal wound/fistula output 1275cc x 24hrs, per cony output 775cc x 24hrs      12/14: ileostomy output 15cc x 24hrs, abdominal wound/fistula output  1230cc x 24hrs, per cony output 1010cc x 24hrs      12/12: ileostomy output 75cc x 24hrs, abdominal wound/fistula output  600cc x 24hrs, per cony output 1125cc x 24hrs      12/5: ileostomy output 35cc x 24hrs, abdominal wound/fistula output  250cc x 24hrs, per cony output 745cc x 24hrs      12/1: ileostomy output 30cc x 24hrs, abdominal wound/fistula output  2400cc x 24hrs, per cony output 550cc x 24hrs     11/27: ileostomy output 50 cc x 24hrs, abdominal wound/fistula output 2175 cc x 24hrs, per cony output 530 cc x 24hrs     11/22: ileostomy output 25cc x 24hrs, abdominal wound/fistula output 2350 cc x 24hrs, per cony output 775cc x 24hrs     11/20: ileostomy output 55cc x 24hrs, abdominal wound/fistula output 2025 cc x 24hrs, per cony output 450cc x 24hrs     11/15:  ileostomy output 65cc x 24hrs, abdominal wound/fistula output 875cc x 24hrs, per cony output 775cc x 24hrs        Pain:    Skin Integrity:    Labs:   12-20    135  |  102  |  41<H>  ----------------------------<  127<H>  4.5   |  26  |  1.58<H>    Ca    8.3<L>      20 Dec 2023 05:30  Phos  3.7     12-20  Mg     2.1     12-20    TPro  8.6<H>  /  Alb  2.2<L>  /  TBili  7.6<H>  /  DBili  5.1<H>  /  AST  119<H>  /  ALT  76<H>  /  AlkPhos  129<H>  12-20    CAPILLARY BLOOD GLUCOSE          Medications:  MEDICATIONS  (STANDING):  chlorhexidine 2% Cloths 1 Application(s) Topical <User Schedule>  cholestyramine Powder (Sugar-Free) 4 Gram(s) Oral daily  epoetin matt-epbx (RETACRIT) Injectable 25911 Unit(s) SubCutaneous every 7 days  ergocalciferol 58547 Unit(s) Oral <User Schedule>  glucagon  Injectable 1 milliGRAM(s) IntraMuscular once  heparin   Injectable 5000 Unit(s) SubCutaneous every 8 hours  labetalol Injectable 10 milliGRAM(s) IV Push every 6 hours  levothyroxine Injectable 30 MICROGram(s) IV Push at bedtime  lipid, fat emulsion (Fish Oil and Plant Based) 20% Infusion 0.53 Gm/kG/Day (20.83 mL/Hr) IV Continuous <Continuous>  Parenteral Nutrition - Adult 1 Each TPN Continuous <Continuous>  ursodiol Suspension 300 milliGRAM(s) Oral every 8 hours  venlafaxine XR. 150 milliGRAM(s) Oral daily    MEDICATIONS  (PRN):  hydrALAZINE Injectable 10 milliGRAM(s) IV Push every 6 hours PRN SBP >170  lidocaine 2% Viscous 10 milliLiter(s) Swish and Spit every 12 hours PRN tongue pain  LORazepam   Injectable 0.5 milliGRAM(s) IV Push at bedtime PRN Anxiety  ondansetron Injectable 4 milliGRAM(s) IV Push every 6 hours PRN Nausea and/or Vomiting      Weight:  Daily     Daily     Weight Change:     Estimated energy needs:     Subjective:     Previous Nutrition Diagnosis:    Active [   ]  Resolved [   ]    If resolved, new PES:     Goal:    Recommendations:    Education:     Risk Level: High [   ] Moderate [   ] Low [   ] Admitting Diagnosis:   Patient is a 78y old  Male who presents with a chief complaint of SBO (20 Dec 2023 13:57)      PAST MEDICAL & SURGICAL HISTORY:  Essential hypertension  Hypertension      Atrial fibrillation  s/p cardioversion  and   Pt. reports 4 DCCV  Now on Amiodarone 200mg PO bid and Eliquis 5mg PO bid  Last DCCV 4 yrs ago at [facility]      Crohn's disease  s/p partial resection of ileum      Hyperlipidemia      Hypothyroidism      History of depression  On Venlafaxine ER 150mg PO bid      Junctional rhythm      H/O knee surgery      History of cataract surgery          Current Nutrition Order: TPN via PICC- 1.9L bag running over 14hrs (135ml/hr), providing 380g Dex, 120g AA, 50g SMOF lipids daily; provides 2272 kcals, 120g protein daily, GIR 4.86 mg/kg/min   PO- Regular diet + 2 LPS per day [provide 100 kcals, 15g protein each], + Ensure Max daily [150 kcals, 20g protein]    PO Intake: Good (%) [   ]  Fair (50-75%) [ x  ] Poor (<25%) [   ]    GI Issues:   : ileostomy output 50cc x 24hrs, abdominal wound/fistula output 1100cc x 24hrs, per cony output 1050cc x 24hrs       : ileostomy output 20cc x 24hrs, abdominal wound/fistula output 1275cc x 24hrs, per cony output 775cc x 24hrs      : ileostomy output 15cc x 24hrs, abdominal wound/fistula output  1230cc x 24hrs, per cony output 1010cc x 24hrs      : ileostomy output 75cc x 24hrs, abdominal wound/fistula output  600cc x 24hrs, per cony output 1125cc x 24hrs      : ileostomy output 35cc x 24hrs, abdominal wound/fistula output  250cc x 24hrs, per cony output 745cc x 24hrs      : ileostomy output 30cc x 24hrs, abdominal wound/fistula output  2400cc x 24hrs, per cony output 550cc x 24hrs     : ileostomy output 50 cc x 24hrs, abdominal wound/fistula output 2175 cc x 24hrs, per cony output 530 cc x 24hrs     : ileostomy output 25cc x 24hrs, abdominal wound/fistula output 2350 cc x 24hrs, per cony output 775cc x 24hrs     : ileostomy output 55cc x 24hrs, abdominal wound/fistula output 2025 cc x 24hrs, per cony output 450cc x 24hrs     11/15:  ileostomy output 65cc x 24hrs, abdominal wound/fistula output 875cc x 24hrs, per cony output 775cc x 24hrs      Pain: no pain/discomfort noted    Skin Integrity: lower back wound/healed, jaundice, abd wound and fistula with wound manager in place, RLQ ileostomy, perc cony drain, Rudy score 17    Labs:       135  |  102  |  41<H>  ----------------------------<  127<H>  4.5   |  26  |  1.58<H>    Ca    8.3<L>      20 Dec 2023 05:30  Phos  3.7     12-20  Mg     2.1     12-20    TPro  8.6<H>  /  Alb  2.2<L>  /  TBili  7.6<H>  /  DBili  5.1<H>  /  AST  119<H>  /  ALT  76<H>  /  AlkPhos  129<H>  12-20    CAPILLARY BLOOD GLUCOSE          Medications:  MEDICATIONS  (STANDING):  chlorhexidine 2% Cloths 1 Application(s) Topical <User Schedule>  cholestyramine Powder (Sugar-Free) 4 Gram(s) Oral daily  epoetin matt-epbx (RETACRIT) Injectable 21684 Unit(s) SubCutaneous every 7 days  ergocalciferol 53551 Unit(s) Oral <User Schedule>  glucagon  Injectable 1 milliGRAM(s) IntraMuscular once  heparin   Injectable 5000 Unit(s) SubCutaneous every 8 hours  labetalol Injectable 10 milliGRAM(s) IV Push every 6 hours  levothyroxine Injectable 30 MICROGram(s) IV Push at bedtime  lipid, fat emulsion (Fish Oil and Plant Based) 20% Infusion 0.53 Gm/kG/Day (20.83 mL/Hr) IV Continuous <Continuous>  Parenteral Nutrition - Adult 1 Each TPN Continuous <Continuous>  ursodiol Suspension 300 milliGRAM(s) Oral every 8 hours  venlafaxine XR. 150 milliGRAM(s) Oral daily    MEDICATIONS  (PRN):  hydrALAZINE Injectable 10 milliGRAM(s) IV Push every 6 hours PRN SBP >170  lidocaine 2% Viscous 10 milliLiter(s) Swish and Spit every 12 hours PRN tongue pain  LORazepam   Injectable 0.5 milliGRAM(s) IV Push at bedtime PRN Anxiety  ondansetron Injectable 4 milliGRAM(s) IV Push every 6 hours PRN Nausea and/or Vomiting      Daily Weight in k.3kg [19 Dec 2023]  Daily 93.3kg [11 Dec 2023]  Daily 93.3kg [08 Dec 2023]  Daily 93.3kg [6 Dec 2023]  Daily 94.2kg [2023]  Daily 94.4kg [2023]  Daily 94.2kg [2023]  Daily 94.2kg [2023]  Daily 94.2kg [2023]  Daily 94.2 [15 Nov 2023]  Daily 94.2kg [2023]  Daily 95.8kg [2023]  Daily 94.2kg [2023]  Daily 85.2kg [2023]  Daily 85.2kg [31 Oct 2023]  Daily 85.2kg [24 Oct 2023]  Daily 85.2kg [20 Oct 2023]  Daily 81.9kg [18 Oct 2023]  Daily 85.2kg [16 Oct 2023]  Daily   95.2kg [12 Oct 2023]   Daily 87.7kg [11 Oct 2023]  Daily 87.4kg [09 Oct 2023]  Daily 86.4kg [07 Oct 2023]  Daily 82.5kg [06 Oct 2023]  Daily 82.6kg [4 Oct 2023]   Daily 83.5kg [03 Oct 2023]  Daily 84.5kg [2 Oct 2023]  Daily 87.2kg [1 Oct 2023]  Daily 88.3kg [29 Sep 2023]  Daily 89.9kg [28 Sep 2023]  Daily 87.7kg [24 Sep 2023]  Daily 90.4kg [21 Sep 2023]  Daily 91.4kg [20 Sep 2023]  Daily 86.7kg [18 Sep 2023]  Daily 88.1kg [16 Sep 2023]  Daily 88.9kg [15 Sep 2023]   Daily 89.1 [14 Sep 2023]  Daily 92.9kg [6 Sep 2023]  Daily 91.8kg [27 Aug 2023]  Daily 101kg [9 Aug 2023]       Weight Change: weight trending down throughout admission, now appears to be trending back up. Recommend continue weekly/daily weights for trending & ensuring adequacy of nutrition provision    IBW: 86.4kg  % IBW: 108.0    Estimated energy needs:   Ideal body weight (86.4kg) used for calculations as pt >100% IBW and BMI <30 per [facility] Standards of Care. Needs estimated for age and adjusted for current clinical status, increased needs for post-op & open abd wound healing    Calories: 2438-1292 kcals based on 30-40 kcals/kg  Protein: 129.6-172.8 g protein based on 1.5-2.0g protein/kg + additional depending on outputs  *Fluid needs per team    Subjective:   78 M w/ Crohn's, AFib/Flutter s/p DCCVs on amiodarone, remote ileocectomy and open appendectomy. Admitted () for SBO vs Crohns flare, s/p NGT decompression and s/p lap TRE converted to open TRE, SBR x 3, left in discontinuity with abthera vac on (), RTOR for ileocolic resection, small bowel anastomosis, and abdominal wall closure on (), c/b fluid collection s/p IR aspiration of perihepatic fluid on (7/3), c/b wound dehiscence s/p RTOR exlap, washout, ileocolic resection with end ileostomy, blow hole colostomy, red rubber from ileostomy to small bowel anastomosis; vicryl bridging mesh on () transferred to SICU postoperatively for hemodynamic monitoring, with hospital course complicated by periods of AMS most recently on  and associated with oliguria, severe ELVI and hyponatremia, which resolved but stepped back up for to SICU on 9/10 for acute AMS, intermittent hypoglycemia, AFib with RVR. Percutaneous Cholecystostomy placed on  for enlarged GB, PCT capped 10/5.    Chart reviewed, discussed with team. TPN via PICC remains primary means to nutrition. 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 Continues to receive lipids in TPN daily. TPN reordered for tonight. Labs: Na 135 [trending low], BUN 41/Creat 1.58 <H> [continues improving], Alb 2.2 <L>, total bili 7.6 <H>, direct bili 5.1 <H>, alk phos 129 <H>,  <H>, ALT 76 <H>, indirect bili 2.4 <H>,  <H>. Meds- on zofran prn, synthroid [administer 30-60 minutes before food; separate at least 4hrs from calcium or iron-containing products or bile acid sequestrants], 50,000 units vit D, EPO, ursodiol, cholestyramine. RDN will continue to monitor, reassess, and intervene as appropriate.     Previous Nutrition Diagnosis: Increased Nutrient Needs R/T physiological demands for nutrient AEB post-op wound healing    Active [ x ]  Resolved [   ]    If resolved, new PES:     Goal: Pt will meet at least 75% of protein & energy needs via most appropriate route for nutrition     Recommendations:  1. c/w TPN via PICC as primary means to nutrition - recommend 1.9L bag running over 14hrs (135ml/hr), providing 380g Dex, 120g AA, 50g SMOF lipids daily; provides 2272 kcals, 120g protein daily, GIR 4.86 mg/kg/min   - provides 26.3 kcals/kg, 20.7 non-protein kcals/kg, 1.4g protein/kg  - monitor weights & wound healing, adjust substrates as indicated  - fluid & lytes per team  - MVI, thiamine, vit C in bag  2. Recheck copper, zinc, selenium  - can recheck vit D this week [continue weekly supplementation]  3. PO diet per team discretion  -  c/w Regular diet as medically feasible to allow for more menu options  - c/w 1 LPS BID [200 kcals, 30g protein] + 1 Ensure Max daily [150 kcals, 30g protein] as amenable  - consider NPO for bowel rest as indicated   - ongoing education prn  4. Hydration per team  - risk for dehydration with high outputs, monitor  - additional fluids per team  - consider oral rehydration solution  5. Weekly lipid panel while on TPN  - hold/decrease lipids if TG >400  - continue to trend LFTs  6. Monitor BMP/Mg/Phos, POC BG while on TPN  - monitor & replenish lytes outside TPN bag prn  7. Continue close monitoring of clinical course & adjust recommendations prn    Education: ongoing education on diet & nutrition provision    Risk Level: High [ x  ] Moderate [   ] Low [   ] Admitting Diagnosis:   Patient is a 78y old  Male who presents with a chief complaint of SBO (20 Dec 2023 13:57)      PAST MEDICAL & SURGICAL HISTORY:  Essential hypertension  Hypertension      Atrial fibrillation  s/p cardioversion  and   Pt. reports 4 DCCV  Now on Amiodarone 200mg PO bid and Eliquis 5mg PO bid  Last DCCV 4 yrs ago at [facility]      Crohn's disease  s/p partial resection of ileum      Hyperlipidemia      Hypothyroidism      History of depression  On Venlafaxine ER 150mg PO bid      Junctional rhythm      H/O knee surgery      History of cataract surgery          Current Nutrition Order: TPN via PICC- 1.9L bag running over 14hrs (135ml/hr), providing 380g Dex, 120g AA, 50g SMOF lipids daily; provides 2272 kcals, 120g protein daily, GIR 4.86 mg/kg/min   PO- Regular diet + 2 LPS per day [provide 100 kcals, 15g protein each], + Ensure Max daily [150 kcals, 20g protein]    PO Intake: Good (%) [   ]  Fair (50-75%) [ x  ] Poor (<25%) [   ]    GI Issues:   : ileostomy output 50cc x 24hrs, abdominal wound/fistula output 1100cc x 24hrs, per cony output 1050cc x 24hrs       : ileostomy output 20cc x 24hrs, abdominal wound/fistula output 1275cc x 24hrs, per cony output 775cc x 24hrs      : ileostomy output 15cc x 24hrs, abdominal wound/fistula output  1230cc x 24hrs, per cony output 1010cc x 24hrs      : ileostomy output 75cc x 24hrs, abdominal wound/fistula output  600cc x 24hrs, per cony output 1125cc x 24hrs      : ileostomy output 35cc x 24hrs, abdominal wound/fistula output  250cc x 24hrs, per cony output 745cc x 24hrs      : ileostomy output 30cc x 24hrs, abdominal wound/fistula output  2400cc x 24hrs, per cony output 550cc x 24hrs     : ileostomy output 50 cc x 24hrs, abdominal wound/fistula output 2175 cc x 24hrs, per cony output 530 cc x 24hrs     : ileostomy output 25cc x 24hrs, abdominal wound/fistula output 2350 cc x 24hrs, per cony output 775cc x 24hrs     : ileostomy output 55cc x 24hrs, abdominal wound/fistula output 2025 cc x 24hrs, per cony output 450cc x 24hrs     11/15:  ileostomy output 65cc x 24hrs, abdominal wound/fistula output 875cc x 24hrs, per cony output 775cc x 24hrs      Pain: no pain/discomfort noted    Skin Integrity: lower back wound/healed, jaundice, abd wound and fistula with wound manager in place, RLQ ileostomy, perc cony drain, Rudy score 17    Labs:       135  |  102  |  41<H>  ----------------------------<  127<H>  4.5   |  26  |  1.58<H>    Ca    8.3<L>      20 Dec 2023 05:30  Phos  3.7     12-20  Mg     2.1     12-20    TPro  8.6<H>  /  Alb  2.2<L>  /  TBili  7.6<H>  /  DBili  5.1<H>  /  AST  119<H>  /  ALT  76<H>  /  AlkPhos  129<H>  12-20    CAPILLARY BLOOD GLUCOSE          Medications:  MEDICATIONS  (STANDING):  chlorhexidine 2% Cloths 1 Application(s) Topical <User Schedule>  cholestyramine Powder (Sugar-Free) 4 Gram(s) Oral daily  epoetin matt-epbx (RETACRIT) Injectable 17110 Unit(s) SubCutaneous every 7 days  ergocalciferol 22862 Unit(s) Oral <User Schedule>  glucagon  Injectable 1 milliGRAM(s) IntraMuscular once  heparin   Injectable 5000 Unit(s) SubCutaneous every 8 hours  labetalol Injectable 10 milliGRAM(s) IV Push every 6 hours  levothyroxine Injectable 30 MICROGram(s) IV Push at bedtime  lipid, fat emulsion (Fish Oil and Plant Based) 20% Infusion 0.53 Gm/kG/Day (20.83 mL/Hr) IV Continuous <Continuous>  Parenteral Nutrition - Adult 1 Each TPN Continuous <Continuous>  ursodiol Suspension 300 milliGRAM(s) Oral every 8 hours  venlafaxine XR. 150 milliGRAM(s) Oral daily    MEDICATIONS  (PRN):  hydrALAZINE Injectable 10 milliGRAM(s) IV Push every 6 hours PRN SBP >170  lidocaine 2% Viscous 10 milliLiter(s) Swish and Spit every 12 hours PRN tongue pain  LORazepam   Injectable 0.5 milliGRAM(s) IV Push at bedtime PRN Anxiety  ondansetron Injectable 4 milliGRAM(s) IV Push every 6 hours PRN Nausea and/or Vomiting      Daily Weight in k.3kg [19 Dec 2023]  Daily 93.3kg [11 Dec 2023]  Daily 93.3kg [08 Dec 2023]  Daily 93.3kg [6 Dec 2023]  Daily 94.2kg [2023]  Daily 94.4kg [2023]  Daily 94.2kg [2023]  Daily 94.2kg [2023]  Daily 94.2kg [2023]  Daily 94.2 [15 Nov 2023]  Daily 94.2kg [2023]  Daily 95.8kg [2023]  Daily 94.2kg [2023]  Daily 85.2kg [2023]  Daily 85.2kg [31 Oct 2023]  Daily 85.2kg [24 Oct 2023]  Daily 85.2kg [20 Oct 2023]  Daily 81.9kg [18 Oct 2023]  Daily 85.2kg [16 Oct 2023]  Daily   95.2kg [12 Oct 2023]   Daily 87.7kg [11 Oct 2023]  Daily 87.4kg [09 Oct 2023]  Daily 86.4kg [07 Oct 2023]  Daily 82.5kg [06 Oct 2023]  Daily 82.6kg [4 Oct 2023]   Daily 83.5kg [03 Oct 2023]  Daily 84.5kg [2 Oct 2023]  Daily 87.2kg [1 Oct 2023]  Daily 88.3kg [29 Sep 2023]  Daily 89.9kg [28 Sep 2023]  Daily 87.7kg [24 Sep 2023]  Daily 90.4kg [21 Sep 2023]  Daily 91.4kg [20 Sep 2023]  Daily 86.7kg [18 Sep 2023]  Daily 88.1kg [16 Sep 2023]  Daily 88.9kg [15 Sep 2023]   Daily 89.1 [14 Sep 2023]  Daily 92.9kg [6 Sep 2023]  Daily 91.8kg [27 Aug 2023]  Daily 101kg [9 Aug 2023]       Weight Change: weight trending down throughout admission, now appears to be trending back up. Recommend continue weekly/daily weights for trending & ensuring adequacy of nutrition provision    IBW: 86.4kg  % IBW: 108.0    Estimated energy needs:   Ideal body weight (86.4kg) used for calculations as pt >100% IBW and BMI <30 per [facility] Standards of Care. Needs estimated for age and adjusted for current clinical status, increased needs for post-op & open abd wound healing    Calories: 0081-7387 kcals based on 30-40 kcals/kg  Protein: 129.6-172.8 g protein based on 1.5-2.0g protein/kg + additional depending on outputs  *Fluid needs per team    Subjective:   78 M w/ Crohn's, AFib/Flutter s/p DCCVs on amiodarone, remote ileocectomy and open appendectomy. Admitted () for SBO vs Crohns flare, s/p NGT decompression and s/p lap TRE converted to open TRE, SBR x 3, left in discontinuity with abthera vac on (), RTOR for ileocolic resection, small bowel anastomosis, and abdominal wall closure on (), c/b fluid collection s/p IR aspiration of perihepatic fluid on (7/3), c/b wound dehiscence s/p RTOR exlap, washout, ileocolic resection with end ileostomy, blow hole colostomy, red rubber from ileostomy to small bowel anastomosis; vicryl bridging mesh on () transferred to SICU postoperatively for hemodynamic monitoring, with hospital course complicated by periods of AMS most recently on  and associated with oliguria, severe ELVI and hyponatremia, which resolved but stepped back up for to SICU on 9/10 for acute AMS, intermittent hypoglycemia, AFib with RVR. Percutaneous Cholecystostomy placed on  for enlarged GB, PCT capped 10/5.    Chart reviewed, discussed with team. TPN via PICC remains primary means to nutrition. 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 Continues to receive lipids in TPN daily. TPN reordered for tonight. Labs: Na 135 [trending low], BUN 41/Creat 1.58 <H> [continues improving], Alb 2.2 <L>, total bili 7.6 <H>, direct bili 5.1 <H>, alk phos 129 <H>,  <H>, ALT 76 <H>, indirect bili 2.4 <H>,  <H>. Meds- on zofran prn, synthroid [administer 30-60 minutes before food; separate at least 4hrs from calcium or iron-containing products or bile acid sequestrants], 50,000 units vit D, EPO, ursodiol, cholestyramine. RDN will continue to monitor, reassess, and intervene as appropriate.     Previous Nutrition Diagnosis: Increased Nutrient Needs R/T physiological demands for nutrient AEB post-op wound healing    Active [ x ]  Resolved [   ]    If resolved, new PES:     Goal: Pt will meet at least 75% of protein & energy needs via most appropriate route for nutrition     Recommendations:  1. c/w TPN via PICC as primary means to nutrition - recommend 1.9L bag running over 14hrs (135ml/hr), providing 380g Dex, 120g AA, 50g SMOF lipids daily; provides 2272 kcals, 120g protein daily, GIR 4.86 mg/kg/min   - provides 26.3 kcals/kg, 20.7 non-protein kcals/kg, 1.4g protein/kg  - monitor weights & wound healing, adjust substrates as indicated  - fluid & lytes per team  - MVI, thiamine, vit C in bag  2. Recheck copper, zinc, selenium  - can recheck vit D this week [continue weekly supplementation]  3. PO diet per team discretion  -  c/w Regular diet as medically feasible to allow for more menu options  - c/w 1 LPS BID [200 kcals, 30g protein] + 1 Ensure Max daily [150 kcals, 30g protein] as amenable  - consider NPO for bowel rest as indicated   - ongoing education prn  4. Hydration per team  - risk for dehydration with high outputs, monitor  - additional fluids per team  - consider oral rehydration solution  5. Weekly lipid panel while on TPN  - hold/decrease lipids if TG >400  - continue to trend LFTs  6. Monitor BMP/Mg/Phos, POC BG while on TPN  - monitor & replenish lytes outside TPN bag prn  7. Continue close monitoring of clinical course & adjust recommendations prn    Education: ongoing education on diet & nutrition provision    Risk Level: High [ x  ] Moderate [   ] Low [   ]

## 2023-12-21 NOTE — PROGRESS NOTE ADULT - ASSESSMENT
78M w PMH Crohn's, AFib/Flutter s/p DCCVs on amiodarone, remote ileocectomy and open appendectomy. Admitted (6/23) for SBO vs Crohns flare, s/p NGT decompression and s/p lap converted to open TRE, SBR x 3, left in discontinuity with abthera vac on (6/27), RTOR for ileocolic resection, small bowel anastomosis, and abdominal wall closure on (6/28), c/b fluid collection s/p IR aspiration of perihepatic fluid on (7/3), c/b wound dehiscence s/p RTOR exlap, washout, ileocolic resection with end ileostomy, blow hole colostomy, red rubber from ileostomy to small bowel anastomosis; vicryl bridging mesh on (7/5) transferred to SICU postoperatively for hemodynamic monitoring, with hospital course complicated by periods of severe ELVI and hyponatremia, which resolved but stepped back up for to SICU on (9/10) for acute AMS, intermittent hypoglycemia, AFib with RVR. Percutaneous Cholecystostomy placed on (9/11) for enlarged GB, PCT capped 10/5 and uncapped 10/25 for hyperbilirubinemia, Right brachial DVT, left basillic and cephalic superficial thrombus on duplex 11/2, CT 11/14 with ALDEN ground glass opacity of unclear etiology, completed empiric 7day cefepime course 11/22, rising T-bili of unclear etilogy, now w resolved candida glabrata fungemia, ELVI resolving.    Trend Cr, f/u nephro recs  Continue diet as tolerated, TPN  f/u hepatology recommendations   Wound care as per ostomy nurses   Continue to watch hydration status and repleting/restricting  Continue with PT and ambulation  Rest of care per primary team

## 2023-12-21 NOTE — PROGRESS NOTE ADULT - SUBJECTIVE AND OBJECTIVE BOX
S: No new issues/events overnight, no new med c/o    O: ICU Vital Signs Last 24 Hrs  T(F): 98.2 (23 @ 09:21), Max: 98.2 (23 @ 09:21)  HR: 75 (23 @ 13:00) (61 - 83)  BP: 107/54 (23 @ 13:00) (107/54 - 179/74)  BP(mean): 77 (23 @ 13:00) (77 - 116)  ABP: --  RR: 19 (23 @ 13:00) (19 - 29)  SpO2: 100% (23 @ 13:00) (94% - 100%)    PHYSICAL EXAM:   Neurological: AAOx3, CNII-XII intact,  strength 5/5 b/l  ENT: mucus membrane moist  Cardiovascular: RRR  Respiratory: CTA  Gastrointestinal: soft, NT, ND, BS+, fistula thickish yellowish formed output, has punctate bleeding from wound bed. perc cony bilious,  Extremities: warm, no dependent edema  Vascular: no cyanosis/erythema  Skin: no rashes  MSK: no joint swelling.    LABS:        135  |  102  |  41<H>  ----------------------------<  127<H>  4.5   |  26  |  1.58<H>    Ca    8.3<L>      20 Dec 2023 05:30  Phos  3.7       Mg     2.1         TPro  8.6<H>  /  Alb  2.2<L>  /  TBili  7.6<H>  /  DBili  5.1<H>  /  AST  119<H>  /  ALT  76<H>  /  AlkPhos  129<H>    LIVER FUNCTIONS - ( 20 Dec 2023 05:30 )  Alb: 2.2 g/dL / Pro: 8.6 g/dL / ALK PHOS: 129 U/L / ALT: 76 U/L / AST: 119 U/L / GGT: x                               7.8    6.65  )-----------( 246      ( 20 Dec 2023 05:30 )             24.3     Urinalysis Basic - ( 20 Dec 2023 14:19 )    Color: Dark Yellow / Appearance: Clear / S.016 / pH: x  Gluc: x / Ketone: Negative mg/dL  / Bili: Moderate / Urobili: 1.0 mg/dL   Blood: x / Protein: 30 mg/dL / Nitrite: Negative   Leuk Esterase: Trace / RBC: 0 /HPF / WBC 0 /HPF   Sq Epi: x / Non Sq Epi: 0 /HPF / Bacteria: Negative /HPF    CAPILLARY BLOOD GLUCOSE        MEDICATIONS  (STANDING):  chlorhexidine 2% Cloths 1 Application(s) Topical <User Schedule>  cholestyramine Powder (Sugar-Free) 4 Gram(s) Oral daily  epoetin matt-epbx (RETACRIT) Injectable 78920 Unit(s) SubCutaneous every 7 days  ergocalciferol 50683 Unit(s) Oral <User Schedule>  glucagon  Injectable 1 milliGRAM(s) IntraMuscular once  heparin   Injectable 5000 Unit(s) SubCutaneous every 8 hours  labetalol Injectable 10 milliGRAM(s) IV Push every 6 hours  levothyroxine Injectable 30 MICROGram(s) IV Push at bedtime  lipid, fat emulsion (Fish Oil and Plant Based) 20% Infusion 0.5359 Gm/kG/Day (20.8 mL/Hr) IV Continuous <Continuous>  Parenteral Nutrition - Adult 1 Each TPN Continuous <Continuous>  Parenteral Nutrition - Adult 1 Each TPN Continuous <Continuous>  ursodiol Suspension 300 milliGRAM(s) Oral every 8 hours  venlafaxine XR. 150 milliGRAM(s) Oral daily    MEDICATIONS  (PRN):  hydrALAZINE Injectable 10 milliGRAM(s) IV Push every 6 hours PRN SBP >170  lidocaine 2% Viscous 10 milliLiter(s) Swish and Spit every 12 hours PRN tongue pain  LORazepam   Injectable 0.5 milliGRAM(s) IV Push at bedtime PRN Anxiety  ondansetron Injectable 4 milliGRAM(s) IV Push every 6 hours PRN Nausea and/or Vomiting      Poole:	  [ x] None	[ ] Daily Poole Order Placed	   Indication:	  [ ] Strict I and O's    [ ] Obstruction     [ ] Incontinence + Stage 3 or 4 Decubitus  Central Line:  [ ] None	   [x ]  Medication / TPN Administration     [ ] No Peripheral IV        S: No new issues/events overnight, no new med c/o    O: ICU Vital Signs Last 24 Hrs  T(F): 98.2 (23 @ 09:21), Max: 98.2 (23 @ 09:21)  HR: 75 (23 @ 13:00) (61 - 83)  BP: 107/54 (23 @ 13:00) (107/54 - 179/74)  BP(mean): 77 (23 @ 13:00) (77 - 116)  ABP: --  RR: 19 (23 @ 13:00) (19 - 29)  SpO2: 100% (23 @ 13:00) (94% - 100%)    PHYSICAL EXAM:   Neurological: AAOx3, CNII-XII intact,  strength 5/5 b/l  ENT: mucus membrane moist  Cardiovascular: RRR  Respiratory: CTA  Gastrointestinal: soft, NT, ND, BS+, fistula thickish yellowish formed output, has punctate bleeding from wound bed. perc cony bilious,  Extremities: warm, no dependent edema  Vascular: no cyanosis/erythema  Skin: no rashes  MSK: no joint swelling.    LABS:        135  |  102  |  41<H>  ----------------------------<  127<H>  4.5   |  26  |  1.58<H>    Ca    8.3<L>      20 Dec 2023 05:30  Phos  3.7       Mg     2.1         TPro  8.6<H>  /  Alb  2.2<L>  /  TBili  7.6<H>  /  DBili  5.1<H>  /  AST  119<H>  /  ALT  76<H>  /  AlkPhos  129<H>    LIVER FUNCTIONS - ( 20 Dec 2023 05:30 )  Alb: 2.2 g/dL / Pro: 8.6 g/dL / ALK PHOS: 129 U/L / ALT: 76 U/L / AST: 119 U/L / GGT: x                               7.8    6.65  )-----------( 246      ( 20 Dec 2023 05:30 )             24.3     Urinalysis Basic - ( 20 Dec 2023 14:19 )    Color: Dark Yellow / Appearance: Clear / S.016 / pH: x  Gluc: x / Ketone: Negative mg/dL  / Bili: Moderate / Urobili: 1.0 mg/dL   Blood: x / Protein: 30 mg/dL / Nitrite: Negative   Leuk Esterase: Trace / RBC: 0 /HPF / WBC 0 /HPF   Sq Epi: x / Non Sq Epi: 0 /HPF / Bacteria: Negative /HPF    CAPILLARY BLOOD GLUCOSE        MEDICATIONS  (STANDING):  chlorhexidine 2% Cloths 1 Application(s) Topical <User Schedule>  cholestyramine Powder (Sugar-Free) 4 Gram(s) Oral daily  epoetin matt-epbx (RETACRIT) Injectable 39061 Unit(s) SubCutaneous every 7 days  ergocalciferol 32483 Unit(s) Oral <User Schedule>  glucagon  Injectable 1 milliGRAM(s) IntraMuscular once  heparin   Injectable 5000 Unit(s) SubCutaneous every 8 hours  labetalol Injectable 10 milliGRAM(s) IV Push every 6 hours  levothyroxine Injectable 30 MICROGram(s) IV Push at bedtime  lipid, fat emulsion (Fish Oil and Plant Based) 20% Infusion 0.5359 Gm/kG/Day (20.8 mL/Hr) IV Continuous <Continuous>  Parenteral Nutrition - Adult 1 Each TPN Continuous <Continuous>  Parenteral Nutrition - Adult 1 Each TPN Continuous <Continuous>  ursodiol Suspension 300 milliGRAM(s) Oral every 8 hours  venlafaxine XR. 150 milliGRAM(s) Oral daily    MEDICATIONS  (PRN):  hydrALAZINE Injectable 10 milliGRAM(s) IV Push every 6 hours PRN SBP >170  lidocaine 2% Viscous 10 milliLiter(s) Swish and Spit every 12 hours PRN tongue pain  LORazepam   Injectable 0.5 milliGRAM(s) IV Push at bedtime PRN Anxiety  ondansetron Injectable 4 milliGRAM(s) IV Push every 6 hours PRN Nausea and/or Vomiting      Poole:	  [ x] None	[ ] Daily Poole Order Placed	   Indication:	  [ ] Strict I and O's    [ ] Obstruction     [ ] Incontinence + Stage 3 or 4 Decubitus  Central Line:  [ ] None	   [x ]  Medication / TPN Administration     [ ] No Peripheral IV        S: No new issues/events overnight, no new med c/o, no SOB or CP    O: ICU Vital Signs Last 24 Hrs  T(F): 98.2 (23 @ 09:21), Max: 98.2 (23 @ 09:21)  HR: 75 (23 @ 13:00) (61 - 83)  BP: 107/54 (23 @ 13:00) (107/54 - 179/74)  BP(mean): 77 (23 @ 13:00) (77 - 116)  ABP: --  RR: 19 (23 @ 13:00) (19 - 29)  SpO2: 100% (23 @ 13:00) (94% - 100%)    PHYSICAL EXAM:   Neurological: AAOx3, CNII-XII intact,  strength 5/5 b/l  ENT: mucus membrane moist  Cardiovascular: RRR  Respiratory: CTA  Gastrointestinal: soft, NT, ND, BS+, fistula thickish yellowish formed output, has punctate bleeding from wound bed. perc cony bilious,  Extremities: warm, no dependent edema  Vascular: no cyanosis/erythema  Skin: no rashes  MSK: no joint swelling.    LABS:        135  |  102  |  41<H>  ----------------------------<  127<H>  4.5   |  26  |  1.58<H>    Ca    8.3<L>      20 Dec 2023 05:30  Phos  3.7       Mg     2.1         TPro  8.6<H>  /  Alb  2.2<L>  /  TBili  7.6<H>  /  DBili  5.1<H>  /  AST  119<H>  /  ALT  76<H>  /  AlkPhos  129<H>  12  LIVER FUNCTIONS - ( 20 Dec 2023 05:30 )  Alb: 2.2 g/dL / Pro: 8.6 g/dL / ALK PHOS: 129 U/L / ALT: 76 U/L / AST: 119 U/L / GGT: x                               7.8    6.65  )-----------( 246      ( 20 Dec 2023 05:30 )             24.3     Urinalysis Basic - ( 20 Dec 2023 14:19 )    Color: Dark Yellow / Appearance: Clear / S.016 / pH: x  Gluc: x / Ketone: Negative mg/dL  / Bili: Moderate / Urobili: 1.0 mg/dL   Blood: x / Protein: 30 mg/dL / Nitrite: Negative   Leuk Esterase: Trace / RBC: 0 /HPF / WBC 0 /HPF   Sq Epi: x / Non Sq Epi: 0 /HPF / Bacteria: Negative /HPF    CAPILLARY BLOOD GLUCOSE        MEDICATIONS  (STANDING):  chlorhexidine 2% Cloths 1 Application(s) Topical <User Schedule>  cholestyramine Powder (Sugar-Free) 4 Gram(s) Oral daily  epoetin matt-epbx (RETACRIT) Injectable 10634 Unit(s) SubCutaneous every 7 days  ergocalciferol 04226 Unit(s) Oral <User Schedule>  glucagon  Injectable 1 milliGRAM(s) IntraMuscular once  heparin   Injectable 5000 Unit(s) SubCutaneous every 8 hours  labetalol Injectable 10 milliGRAM(s) IV Push every 6 hours  levothyroxine Injectable 30 MICROGram(s) IV Push at bedtime  lipid, fat emulsion (Fish Oil and Plant Based) 20% Infusion 0.5359 Gm/kG/Day (20.8 mL/Hr) IV Continuous <Continuous>  Parenteral Nutrition - Adult 1 Each TPN Continuous <Continuous>  Parenteral Nutrition - Adult 1 Each TPN Continuous <Continuous>  ursodiol Suspension 300 milliGRAM(s) Oral every 8 hours  venlafaxine XR. 150 milliGRAM(s) Oral daily    MEDICATIONS  (PRN):  hydrALAZINE Injectable 10 milliGRAM(s) IV Push every 6 hours PRN SBP >170  lidocaine 2% Viscous 10 milliLiter(s) Swish and Spit every 12 hours PRN tongue pain  LORazepam   Injectable 0.5 milliGRAM(s) IV Push at bedtime PRN Anxiety  ondansetron Injectable 4 milliGRAM(s) IV Push every 6 hours PRN Nausea and/or Vomiting      Poole:	  [ x] None	[ ] Daily Poole Order Placed	   Indication:	  [ ] Strict I and O's    [ ] Obstruction     [ ] Incontinence + Stage 3 or 4 Decubitus  Central Line:  [ ] None	   [x ]  Medication / TPN Administration     [ ] No Peripheral IV        S: No new issues/events overnight, no new med c/o, no SOB or CP    O: ICU Vital Signs Last 24 Hrs  T(F): 98.2 (23 @ 09:21), Max: 98.2 (23 @ 09:21)  HR: 75 (23 @ 13:00) (61 - 83)  BP: 107/54 (23 @ 13:00) (107/54 - 179/74)  BP(mean): 77 (23 @ 13:00) (77 - 116)  ABP: --  RR: 19 (23 @ 13:00) (19 - 29)  SpO2: 100% (23 @ 13:00) (94% - 100%)    PHYSICAL EXAM:   Neurological: AAOx3, CNII-XII intact,  strength 5/5 b/l  ENT: mucus membrane moist  Cardiovascular: RRR  Respiratory: CTA  Gastrointestinal: soft, NT, ND, BS+, fistula thickish yellowish formed output, has punctate bleeding from wound bed. perc cony bilious,  Extremities: warm, no dependent edema  Vascular: no cyanosis/erythema  Skin: no rashes  MSK: no joint swelling.    LABS:        135  |  102  |  41<H>  ----------------------------<  127<H>  4.5   |  26  |  1.58<H>    Ca    8.3<L>      20 Dec 2023 05:30  Phos  3.7       Mg     2.1         TPro  8.6<H>  /  Alb  2.2<L>  /  TBili  7.6<H>  /  DBili  5.1<H>  /  AST  119<H>  /  ALT  76<H>  /  AlkPhos  129<H>  12  LIVER FUNCTIONS - ( 20 Dec 2023 05:30 )  Alb: 2.2 g/dL / Pro: 8.6 g/dL / ALK PHOS: 129 U/L / ALT: 76 U/L / AST: 119 U/L / GGT: x                               7.8    6.65  )-----------( 246      ( 20 Dec 2023 05:30 )             24.3     Urinalysis Basic - ( 20 Dec 2023 14:19 )    Color: Dark Yellow / Appearance: Clear / S.016 / pH: x  Gluc: x / Ketone: Negative mg/dL  / Bili: Moderate / Urobili: 1.0 mg/dL   Blood: x / Protein: 30 mg/dL / Nitrite: Negative   Leuk Esterase: Trace / RBC: 0 /HPF / WBC 0 /HPF   Sq Epi: x / Non Sq Epi: 0 /HPF / Bacteria: Negative /HPF    CAPILLARY BLOOD GLUCOSE        MEDICATIONS  (STANDING):  chlorhexidine 2% Cloths 1 Application(s) Topical <User Schedule>  cholestyramine Powder (Sugar-Free) 4 Gram(s) Oral daily  epoetin matt-epbx (RETACRIT) Injectable 82521 Unit(s) SubCutaneous every 7 days  ergocalciferol 06078 Unit(s) Oral <User Schedule>  glucagon  Injectable 1 milliGRAM(s) IntraMuscular once  heparin   Injectable 5000 Unit(s) SubCutaneous every 8 hours  labetalol Injectable 10 milliGRAM(s) IV Push every 6 hours  levothyroxine Injectable 30 MICROGram(s) IV Push at bedtime  lipid, fat emulsion (Fish Oil and Plant Based) 20% Infusion 0.5359 Gm/kG/Day (20.8 mL/Hr) IV Continuous <Continuous>  Parenteral Nutrition - Adult 1 Each TPN Continuous <Continuous>  Parenteral Nutrition - Adult 1 Each TPN Continuous <Continuous>  ursodiol Suspension 300 milliGRAM(s) Oral every 8 hours  venlafaxine XR. 150 milliGRAM(s) Oral daily    MEDICATIONS  (PRN):  hydrALAZINE Injectable 10 milliGRAM(s) IV Push every 6 hours PRN SBP >170  lidocaine 2% Viscous 10 milliLiter(s) Swish and Spit every 12 hours PRN tongue pain  LORazepam   Injectable 0.5 milliGRAM(s) IV Push at bedtime PRN Anxiety  ondansetron Injectable 4 milliGRAM(s) IV Push every 6 hours PRN Nausea and/or Vomiting      Poole:	  [ x] None	[ ] Daily Poole Order Placed	   Indication:	  [ ] Strict I and O's    [ ] Obstruction     [ ] Incontinence + Stage 3 or 4 Decubitus  Central Line:  [ ] None	   [x ]  Medication / TPN Administration     [ ] No Peripheral IV

## 2023-12-21 NOTE — PROGRESS NOTE ADULT - NS ATTEND AMEND GEN_ALL_CORE FT
UC, AF, s/p SBR, ileocolic resection, ileostomy, multiple sepsis episodes with enteroatmospheric fistula, elevated LFT  physical as above  continue metoprolol  TPN continues  NIV for one hour BID  follow fluid balance

## 2023-12-21 NOTE — PROGRESS NOTE ADULT - SUBJECTIVE AND OBJECTIVE BOX
SUBJECTIVE: Pt seen and examined at bedside this am by surgery team. Resting comfortably. Denies f/n/v/cp/sob.    MEDICATIONS  (STANDING):  chlorhexidine 2% Cloths 1 Application(s) Topical <User Schedule>  cholestyramine Powder (Sugar-Free) 4 Gram(s) Oral daily  epoetin matt-epbx (RETACRIT) Injectable 83485 Unit(s) SubCutaneous every 7 days  ergocalciferol 71583 Unit(s) Oral <User Schedule>  glucagon  Injectable 1 milliGRAM(s) IntraMuscular once  heparin   Injectable 5000 Unit(s) SubCutaneous every 8 hours  labetalol Injectable 10 milliGRAM(s) IV Push every 6 hours  levothyroxine Injectable 30 MICROGram(s) IV Push at bedtime  lipid, fat emulsion (Fish Oil and Plant Based) 20% Infusion 0.53 Gm/kG/Day (20.83 mL/Hr) IV Continuous <Continuous>  Parenteral Nutrition - Adult 1 Each TPN Continuous <Continuous>  ursodiol Suspension 300 milliGRAM(s) Oral every 8 hours  venlafaxine XR. 150 milliGRAM(s) Oral daily    MEDICATIONS  (PRN):  hydrALAZINE Injectable 10 milliGRAM(s) IV Push every 6 hours PRN SBP >170  lidocaine 2% Viscous 10 milliLiter(s) Swish and Spit every 12 hours PRN tongue pain  LORazepam   Injectable 0.5 milliGRAM(s) IV Push at bedtime PRN Anxiety  ondansetron Injectable 4 milliGRAM(s) IV Push every 6 hours PRN Nausea and/or Vomiting    Vital Signs Last 24 Hrs  T(C): 36.8 (21 Dec 2023 09:21), Max: 36.8 (21 Dec 2023 09:21)  T(F): 98.2 (21 Dec 2023 09:21), Max: 98.2 (21 Dec 2023 09:21)  HR: 78 (21 Dec 2023 09:21) (67 - 83)  BP: 143/63 (21 Dec 2023 09:00) (126/58 - 179/74)  BP(mean): 91 (21 Dec 2023 09:00) (84 - 116)  RR: 22 (21 Dec 2023 09:21) (18 - 29)  SpO2: 96% (21 Dec 2023 09:21) (94% - 100%)    Parameters below as of 21 Dec 2023 10:00  Patient On (Oxygen Delivery Method): room air    PHYSICAL EXAM:    Constitutional: A&Ox3, sitting up comfortably in bed, +jaundice  Respiratory: non labored breathing, no respiratory distress  Cardiovascular: NSR, RRR  Gastrointestinal: Abdomen soft, NT/ND. no R/G. PCT bilious, ostomy site w/ bilious OP, fistula w/ bilious OP  Extremities: wwp, no calf tenderness or edema, +SCDs      I&O's Detail    20 Dec 2023 07:01  -  21 Dec 2023 07:00  --------------------------------------------------------  IN:    Fat Emulsion (Fish Oil &amp; Plant Based) 20% Infusion: 249.6 mL    Oral Fluid: 870 mL    TPN (Total Parenteral Nutrition): 1939 mL  Total IN: 3058.6 mL    OUT:    Drain (mL): 1100 mL    Drain (mL): 50 mL    Drain (mL): 1050 mL    Fat Emulsion (Fish Oil &amp; Plant Based) 20% Infusion: 0 mL    Voided (mL): 1650 mL  Total OUT: 3850 mL    Total NET: -791.4 mL      21 Dec 2023 07:01  -  21 Dec 2023 10:03  --------------------------------------------------------  IN:    TPN (Total Parenteral Nutrition): 121 mL  Total IN: 121 mL    OUT:    Drain (mL): 75 mL    Fat Emulsion (Fish Oil &amp; Plant Based) 20% Infusion: 0 mL    Voided (mL): 200 mL  Total OUT: 275 mL    Total NET: -154 mL          LABS:                        7.8    6.65  )-----------( 246      ( 20 Dec 2023 05:30 )             24.3     12-20    135  |  102  |  41<H>  ----------------------------<  127<H>  4.5   |  26  |  1.58<H>    Ca    8.3<L>      20 Dec 2023 05:30  Phos  3.7     12-20  Mg     2.1     12-20    TPro  8.6<H>  /  Alb  2.2<L>  /  TBili  7.6<H>  /  DBili  5.1<H>  /  AST  119<H>  /  ALT  76<H>  /  AlkPhos  129<H>  12-20      Urinalysis Basic - ( 20 Dec 2023 14:19 )    Color: Dark Yellow / Appearance: Clear / S.016 / pH: x  Gluc: x / Ketone: Negative mg/dL  / Bili: Moderate / Urobili: 1.0 mg/dL   Blood: x / Protein: 30 mg/dL / Nitrite: Negative   Leuk Esterase: Trace / RBC: 0 /HPF / WBC 0 /HPF   Sq Epi: x / Non Sq Epi: 0 /HPF / Bacteria: Negative /HPF        RADIOLOGY & ADDITIONAL STUDIES: SUBJECTIVE: Pt seen and examined at bedside this am by surgery team. Resting comfortably. Denies f/n/v/cp/sob.    MEDICATIONS  (STANDING):  chlorhexidine 2% Cloths 1 Application(s) Topical <User Schedule>  cholestyramine Powder (Sugar-Free) 4 Gram(s) Oral daily  epoetin matt-epbx (RETACRIT) Injectable 48799 Unit(s) SubCutaneous every 7 days  ergocalciferol 65465 Unit(s) Oral <User Schedule>  glucagon  Injectable 1 milliGRAM(s) IntraMuscular once  heparin   Injectable 5000 Unit(s) SubCutaneous every 8 hours  labetalol Injectable 10 milliGRAM(s) IV Push every 6 hours  levothyroxine Injectable 30 MICROGram(s) IV Push at bedtime  lipid, fat emulsion (Fish Oil and Plant Based) 20% Infusion 0.53 Gm/kG/Day (20.83 mL/Hr) IV Continuous <Continuous>  Parenteral Nutrition - Adult 1 Each TPN Continuous <Continuous>  ursodiol Suspension 300 milliGRAM(s) Oral every 8 hours  venlafaxine XR. 150 milliGRAM(s) Oral daily    MEDICATIONS  (PRN):  hydrALAZINE Injectable 10 milliGRAM(s) IV Push every 6 hours PRN SBP >170  lidocaine 2% Viscous 10 milliLiter(s) Swish and Spit every 12 hours PRN tongue pain  LORazepam   Injectable 0.5 milliGRAM(s) IV Push at bedtime PRN Anxiety  ondansetron Injectable 4 milliGRAM(s) IV Push every 6 hours PRN Nausea and/or Vomiting    Vital Signs Last 24 Hrs  T(C): 36.8 (21 Dec 2023 09:21), Max: 36.8 (21 Dec 2023 09:21)  T(F): 98.2 (21 Dec 2023 09:21), Max: 98.2 (21 Dec 2023 09:21)  HR: 78 (21 Dec 2023 09:21) (67 - 83)  BP: 143/63 (21 Dec 2023 09:00) (126/58 - 179/74)  BP(mean): 91 (21 Dec 2023 09:00) (84 - 116)  RR: 22 (21 Dec 2023 09:21) (18 - 29)  SpO2: 96% (21 Dec 2023 09:21) (94% - 100%)    Parameters below as of 21 Dec 2023 10:00  Patient On (Oxygen Delivery Method): room air    PHYSICAL EXAM:    Constitutional: A&Ox3, sitting up comfortably in bed, +jaundice  Respiratory: non labored breathing, no respiratory distress  Cardiovascular: NSR, RRR  Gastrointestinal: Abdomen soft, NT/ND. no R/G. PCT bilious, ostomy site w/ bilious OP, fistula w/ bilious OP  Extremities: wwp, no calf tenderness or edema, +SCDs      I&O's Detail    20 Dec 2023 07:01  -  21 Dec 2023 07:00  --------------------------------------------------------  IN:    Fat Emulsion (Fish Oil &amp; Plant Based) 20% Infusion: 249.6 mL    Oral Fluid: 870 mL    TPN (Total Parenteral Nutrition): 1939 mL  Total IN: 3058.6 mL    OUT:    Drain (mL): 1100 mL    Drain (mL): 50 mL    Drain (mL): 1050 mL    Fat Emulsion (Fish Oil &amp; Plant Based) 20% Infusion: 0 mL    Voided (mL): 1650 mL  Total OUT: 3850 mL    Total NET: -791.4 mL      21 Dec 2023 07:01  -  21 Dec 2023 10:03  --------------------------------------------------------  IN:    TPN (Total Parenteral Nutrition): 121 mL  Total IN: 121 mL    OUT:    Drain (mL): 75 mL    Fat Emulsion (Fish Oil &amp; Plant Based) 20% Infusion: 0 mL    Voided (mL): 200 mL  Total OUT: 275 mL    Total NET: -154 mL          LABS:                        7.8    6.65  )-----------( 246      ( 20 Dec 2023 05:30 )             24.3     12-20    135  |  102  |  41<H>  ----------------------------<  127<H>  4.5   |  26  |  1.58<H>    Ca    8.3<L>      20 Dec 2023 05:30  Phos  3.7     12-20  Mg     2.1     12-20    TPro  8.6<H>  /  Alb  2.2<L>  /  TBili  7.6<H>  /  DBili  5.1<H>  /  AST  119<H>  /  ALT  76<H>  /  AlkPhos  129<H>  12-20      Urinalysis Basic - ( 20 Dec 2023 14:19 )    Color: Dark Yellow / Appearance: Clear / S.016 / pH: x  Gluc: x / Ketone: Negative mg/dL  / Bili: Moderate / Urobili: 1.0 mg/dL   Blood: x / Protein: 30 mg/dL / Nitrite: Negative   Leuk Esterase: Trace / RBC: 0 /HPF / WBC 0 /HPF   Sq Epi: x / Non Sq Epi: 0 /HPF / Bacteria: Negative /HPF        RADIOLOGY & ADDITIONAL STUDIES:

## 2023-12-22 NOTE — PROGRESS NOTE ADULT - ASSESSMENT
78M w PMH Crohn's, AFib/Flutter s/p DCCVs on amiodarone, remote ileocectomy and open appendectomy. Admitted (6/23) for SBO vs Crohns flare, s/p NGT decompression and s/p lap converted to open TRE, SBR x 3, left in discontinuity with abthera vac on (6/27), RTOR for ileocolic resection, small bowel anastomosis, and abdominal wall closure on (6/28), c/b fluid collection s/p IR aspiration of perihepatic fluid on (7/3), c/b wound dehiscence s/p RTOR exlap, washout, ileocolic resection with end ileostomy, blow hole colostomy, red rubber from ileostomy to small bowel anastomosis; vicryl bridging mesh on (7/5) transferred to SICU postoperatively for hemodynamic monitoring, with hospital course complicated by periods of severe ELVI and hyponatremia, which resolved but stepped back up for to SICU on (9/10) for acute AMS, intermittent hypoglycemia, AFib with RVR. Percutaneous Cholecystostomy placed on (9/11) for enlarged GB, PCT capped 10/5 and uncapped 10/25 for hyperbilirubinemia, Right brachial DVT, left basillic and cephalic superficial thrombus on duplex 11/2, CT 11/14 with ALDEN ground glass opacity of unclear etiology, completed empiric 7day cefepime course 11/22, rising T-bili of unclear etilogy, now w resolved candida glabrata fungemia, ELVI resolving.    Consider pulmonary workup if continued complaints of shortness of breathe  Trend Cr, f/u Nephrology w/ addl recs   cw diet as tolerated   f/u hepatology recommendations   Wound care as per ostomy nurses   Continue to watch hydration status and repleting/restricting  continue with PT   rest of care per SICU, appreciate excellent care by SICU team

## 2023-12-22 NOTE — PROGRESS NOTE ADULT - SUBJECTIVE AND OBJECTIVE BOX
S: had some anxiety last night, given ativan, also had some ?SOB? last night, which pt attributes to feeling anxious.   No new issues/events overnight, no new med c/o    MEDICATIONS  (STANDING):  chlorhexidine 2% Cloths 1 Application(s) Topical <User Schedule>  cholestyramine Powder (Sugar-Free) 4 Gram(s) Oral daily  epoetin matt-epbx (RETACRIT) Injectable 38317 Unit(s) SubCutaneous every 7 days  ergocalciferol 63307 Unit(s) Oral <User Schedule>  glucagon  Injectable 1 milliGRAM(s) IntraMuscular once  heparin   Injectable 5000 Unit(s) SubCutaneous every 8 hours  labetalol Injectable 10 milliGRAM(s) IV Push every 6 hours  levothyroxine Injectable 30 MICROGram(s) IV Push at bedtime  lipid, fat emulsion (Fish Oil and Plant Based) 20% Infusion 0.5359 Gm/kG/Day (20.83 mL/Hr) IV Continuous <Continuous>  Parenteral Nutrition - Adult 1 Each TPN Continuous <Continuous>  Parenteral Nutrition - Adult 1 Each TPN Continuous <Continuous>  ursodiol Suspension 300 milliGRAM(s) Oral every 8 hours  venlafaxine XR. 150 milliGRAM(s) Oral daily    MEDICATIONS  (PRN):  hydrALAZINE Injectable 10 milliGRAM(s) IV Push every 6 hours PRN SBP >170  lidocaine 2% Viscous 10 milliLiter(s) Swish and Spit every 12 hours PRN tongue pain  LORazepam   Injectable 0.5 milliGRAM(s) IV Push <User Schedule> PRN Anxiety  ondansetron Injectable 4 milliGRAM(s) IV Push every 6 hours PRN Nausea and/or Vomiting      Vital Signs Last 24 Hrs  T(C): 36.7 (22 Dec 2023 09:06), Max: 36.7 (22 Dec 2023 06:25)  T(F): 98 (22 Dec 2023 09:06), Max: 98.1 (22 Dec 2023 06:25)  HR: 88 (22 Dec 2023 13:00) (62 - 88)  BP: 166/71 (22 Dec 2023 13:00) (135/62 - 185/75)  BP(mean): 102 (22 Dec 2023 13:00) (89 - 111)  RR: 27 (22 Dec 2023 13:00) (20 - 38)  SpO2: 96% (22 Dec 2023 13:00) (92% - 100%)    Parameters below as of 22 Dec 2023 13:00  Patient On (Oxygen Delivery Method): room air    PHYSICAL EXAM:   Neurological: AAOx3, CNII-XII intact,  strength 5/5 b/l  ENT: mucus membrane moist  Cardiovascular: RRR  Respiratory: CTA  Gastrointestinal: soft, NT, ND, BS+, fistula thickish yellowish formed output, has punctate bleeding from wound bed. perc cony bilious,  Extremities: warm, no dependent edema  Vascular: no cyanosis/erythema  Skin: no rashes  MSK: no joint swelling.    I&O's Summary    21 Dec 2023 07:01  -  22 Dec 2023 07:00  --------------------------------------------------------  IN: 3783.4 mL / OUT: 3170 mL / NET: 613.4 mL    22 Dec 2023 07:01  -  22 Dec 2023 14:34  --------------------------------------------------------  IN: 514 mL / OUT: 850 mL / NET: -336 mL        LABS:    12-22    137  |  102  |  46<H>  ----------------------------<  126<H>  4.5   |  28  |  1.55<H>    Ca    8.4      22 Dec 2023 06:43  Phos  3.3     12-22  Mg     2.2     12-22    TPro  8.9<H>  /  Alb  2.3<L>  /  TBili  7.0<H>  /  DBili  4.7<H>  /  AST  117<H>  /  ALT  76<H>  /  AlkPhos  133<H>  12-22      Urinalysis Basic - ( 22 Dec 2023 06:43 )    Color: x / Appearance: x / SG: x / pH: x  Gluc: 126 mg/dL / Ketone: x  / Bili: x / Urobili: x   Blood: x / Protein: x / Nitrite: x   Leuk Esterase: x / RBC: x / WBC x   Sq Epi: x / Non Sq Epi: x / Bacteria: x      CAPILLARY BLOOD GLUCOSE        LIVER FUNCTIONS - ( 22 Dec 2023 06:43 )  Alb: 2.3 g/dL / Pro: 8.9 g/dL / ALK PHOS: 133 U/L / ALT: 76 U/L / AST: 117 U/L / GGT: x             RADIOLOGY & ADDITIONAL STUDIES:   S: had some anxiety last night, given ativan, also had some ?SOB? last night, which pt attributes to feeling anxious.   No new issues/events overnight, no new med c/o    MEDICATIONS  (STANDING):  chlorhexidine 2% Cloths 1 Application(s) Topical <User Schedule>  cholestyramine Powder (Sugar-Free) 4 Gram(s) Oral daily  epoetin matt-epbx (RETACRIT) Injectable 92124 Unit(s) SubCutaneous every 7 days  ergocalciferol 44150 Unit(s) Oral <User Schedule>  glucagon  Injectable 1 milliGRAM(s) IntraMuscular once  heparin   Injectable 5000 Unit(s) SubCutaneous every 8 hours  labetalol Injectable 10 milliGRAM(s) IV Push every 6 hours  levothyroxine Injectable 30 MICROGram(s) IV Push at bedtime  lipid, fat emulsion (Fish Oil and Plant Based) 20% Infusion 0.5359 Gm/kG/Day (20.83 mL/Hr) IV Continuous <Continuous>  Parenteral Nutrition - Adult 1 Each TPN Continuous <Continuous>  Parenteral Nutrition - Adult 1 Each TPN Continuous <Continuous>  ursodiol Suspension 300 milliGRAM(s) Oral every 8 hours  venlafaxine XR. 150 milliGRAM(s) Oral daily    MEDICATIONS  (PRN):  hydrALAZINE Injectable 10 milliGRAM(s) IV Push every 6 hours PRN SBP >170  lidocaine 2% Viscous 10 milliLiter(s) Swish and Spit every 12 hours PRN tongue pain  LORazepam   Injectable 0.5 milliGRAM(s) IV Push <User Schedule> PRN Anxiety  ondansetron Injectable 4 milliGRAM(s) IV Push every 6 hours PRN Nausea and/or Vomiting      Vital Signs Last 24 Hrs  T(C): 36.7 (22 Dec 2023 09:06), Max: 36.7 (22 Dec 2023 06:25)  T(F): 98 (22 Dec 2023 09:06), Max: 98.1 (22 Dec 2023 06:25)  HR: 88 (22 Dec 2023 13:00) (62 - 88)  BP: 166/71 (22 Dec 2023 13:00) (135/62 - 185/75)  BP(mean): 102 (22 Dec 2023 13:00) (89 - 111)  RR: 27 (22 Dec 2023 13:00) (20 - 38)  SpO2: 96% (22 Dec 2023 13:00) (92% - 100%)    Parameters below as of 22 Dec 2023 13:00  Patient On (Oxygen Delivery Method): room air    PHYSICAL EXAM:   Neurological: AAOx3, CNII-XII intact,  strength 5/5 b/l  ENT: mucus membrane moist  Cardiovascular: RRR  Respiratory: CTA  Gastrointestinal: soft, NT, ND, BS+, fistula thickish yellowish formed output, has punctate bleeding from wound bed. perc cony bilious,  Extremities: warm, no dependent edema  Vascular: no cyanosis/erythema  Skin: no rashes  MSK: no joint swelling.    I&O's Summary    21 Dec 2023 07:01  -  22 Dec 2023 07:00  --------------------------------------------------------  IN: 3783.4 mL / OUT: 3170 mL / NET: 613.4 mL    22 Dec 2023 07:01  -  22 Dec 2023 14:34  --------------------------------------------------------  IN: 514 mL / OUT: 850 mL / NET: -336 mL        LABS:    12-22    137  |  102  |  46<H>  ----------------------------<  126<H>  4.5   |  28  |  1.55<H>    Ca    8.4      22 Dec 2023 06:43  Phos  3.3     12-22  Mg     2.2     12-22    TPro  8.9<H>  /  Alb  2.3<L>  /  TBili  7.0<H>  /  DBili  4.7<H>  /  AST  117<H>  /  ALT  76<H>  /  AlkPhos  133<H>  12-22      Urinalysis Basic - ( 22 Dec 2023 06:43 )    Color: x / Appearance: x / SG: x / pH: x  Gluc: 126 mg/dL / Ketone: x  / Bili: x / Urobili: x   Blood: x / Protein: x / Nitrite: x   Leuk Esterase: x / RBC: x / WBC x   Sq Epi: x / Non Sq Epi: x / Bacteria: x      CAPILLARY BLOOD GLUCOSE        LIVER FUNCTIONS - ( 22 Dec 2023 06:43 )  Alb: 2.3 g/dL / Pro: 8.9 g/dL / ALK PHOS: 133 U/L / ALT: 76 U/L / AST: 117 U/L / GGT: x             RADIOLOGY & ADDITIONAL STUDIES:

## 2023-12-22 NOTE — ADVANCED PRACTICE NURSE CONSULT - ASSESSMENT
Abdomen: midline [place] soft convex pouch leaking inferiorly. Stomatized fistula stable with thick effluent. Peristomal skin remains denuded inferiorly but improving. Small areas of bleeding with pouch removal, all resolved with pressure.  New Dustin's wound/fistula manager applied - first peristomal skin crusted, then applied Adapt rings and paste. Covered with Adapt barrier sheet. Large Dustin wound manager applied.  New Coloplast 1 piece soft convex appliance placed over ileostomy site after red rubber catheter introduced into stoma.    Abdomen: midline [place] soft convex pouch leaking inferiorly. Stomatized fistula stable with thick effluent. Peristomal skin remains denuded inferiorly but improving. Small areas of bleeding with pouch removal, all resolved with pressure.  New Dustin's wound/fistula manager applied - first peristomal skin crusted, then applied Adapt rings and paste. Covered with Adapt barrier sheet. Large Dustin wound manager applied.  New Coloplast 1 piece soft convex appliance placed over ileostomy site after red rubber catheter introduced into stoma.

## 2023-12-22 NOTE — PROGRESS NOTE ADULT - SUBJECTIVE AND OBJECTIVE BOX
S: had some anxiety last night, given ativan, also had some ?SOB? last night, which pt attributes to feeling anxious.   No new issues/events overnight, no new med c/o    O: ICU Vital Signs Last 24 Hrs  T(F): 98 (12-22-23 @ 09:06), Max: 98.1 (12-22-23 @ 06:25)  HR: 78 (12-22-23 @ 11:00) (61 - 86)  BP: 169/74 (12-22-23 @ 11:00) (107/54 - 174/73)  BP(mean): 107 (12-22-23 @ 11:00) (72 - 111)  ABP: --  RR: 28 (12-22-23 @ 11:00) (18 - 38)  SpO2: 95% (12-22-23 @ 11:00) (92% - 100%)    PHYSICAL EXAM:   Neurological: AAOx3, CNII-XII intact,  strength 5/5 b/l  ENT: mucus membrane moist  Cardiovascular: RRR  Respiratory: CTA  Gastrointestinal: soft, NT, ND, BS+, fistula thickish yellowish formed output, has punctate bleeding from wound bed. perc cony bilious,  Extremities: warm, no dependent edema  Vascular: no cyanosis/erythema  Skin: no rashes  MSK: no joint swelling.      LABS:    12-22    137  |  102  |  46<H>  ----------------------------<  126<H>  4.5   |  28  |  1.55<H>    Ca    8.4      22 Dec 2023 06:43  Phos  3.3     12-22  Mg     2.2     12-22    TPro  8.9<H>  /  Alb  2.3<L>  /  TBili  7.0<H>  /  DBili  4.7<H>  /  AST  117<H>  /  ALT  76<H>  /  AlkPhos  133<H>  12-22  LIVER FUNCTIONS - ( 22 Dec 2023 06:43 )  Alb: 2.3 g/dL / Pro: 8.9 g/dL / ALK PHOS: 133 U/L / ALT: 76 U/L / AST: 117 U/L / GGT: x           Urinalysis Basic - ( 22 Dec 2023 06:43 )    Color: x / Appearance: x / SG: x / pH: x  Gluc: 126 mg/dL / Ketone: x  / Bili: x / Urobili: x   Blood: x / Protein: x / Nitrite: x   Leuk Esterase: x / RBC: x / WBC x   Sq Epi: x / Non Sq Epi: x / Bacteria: x    CAPILLARY BLOOD GLUCOSE        MEDICATIONS  (STANDING):  chlorhexidine 2% Cloths 1 Application(s) Topical <User Schedule>  cholestyramine Powder (Sugar-Free) 4 Gram(s) Oral daily  epoetin matt-epbx (RETACRIT) Injectable 86258 Unit(s) SubCutaneous every 7 days  ergocalciferol 46058 Unit(s) Oral <User Schedule>  glucagon  Injectable 1 milliGRAM(s) IntraMuscular once  heparin   Injectable 5000 Unit(s) SubCutaneous every 8 hours  labetalol Injectable 10 milliGRAM(s) IV Push every 6 hours  levothyroxine Injectable 30 MICROGram(s) IV Push at bedtime  lipid, fat emulsion (Fish Oil and Plant Based) 20% Infusion 0.5359 Gm/kG/Day (20.8 mL/Hr) IV Continuous <Continuous>  Parenteral Nutrition - Adult 1 Each TPN Continuous <Continuous>  Parenteral Nutrition - Adult 1 Each TPN Continuous <Continuous>  ursodiol Suspension 300 milliGRAM(s) Oral every 8 hours  venlafaxine XR. 150 milliGRAM(s) Oral daily    MEDICATIONS  (PRN):  hydrALAZINE Injectable 10 milliGRAM(s) IV Push every 6 hours PRN SBP >170  lidocaine 2% Viscous 10 milliLiter(s) Swish and Spit every 12 hours PRN tongue pain  LORazepam   Injectable 0.5 milliGRAM(s) IV Push <User Schedule> PRN Anxiety  ondansetron Injectable 4 milliGRAM(s) IV Push every 6 hours PRN Nausea and/or Vomiting      Poole:	  [x ] None	[ ] Daily Poole Order Placed	   Indication:	  [ ] Strict I and O's    [ ] Obstruction     [ ] Incontinence + Stage 3 or 4 Decubitus  Central Line:  [ ] None	   [ x]  Medication / TPN Administration     [ ] No Peripheral IV        S: had some anxiety last night, given ativan, also had some ?SOB? last night, which pt attributes to feeling anxious.   No new issues/events overnight, no new med c/o    O: ICU Vital Signs Last 24 Hrs  T(F): 98 (12-22-23 @ 09:06), Max: 98.1 (12-22-23 @ 06:25)  HR: 78 (12-22-23 @ 11:00) (61 - 86)  BP: 169/74 (12-22-23 @ 11:00) (107/54 - 174/73)  BP(mean): 107 (12-22-23 @ 11:00) (72 - 111)  ABP: --  RR: 28 (12-22-23 @ 11:00) (18 - 38)  SpO2: 95% (12-22-23 @ 11:00) (92% - 100%)    PHYSICAL EXAM:   Neurological: AAOx3, CNII-XII intact,  strength 5/5 b/l  ENT: mucus membrane moist  Cardiovascular: RRR  Respiratory: CTA  Gastrointestinal: soft, NT, ND, BS+, fistula thickish yellowish formed output, has punctate bleeding from wound bed. perc cony bilious,  Extremities: warm, no dependent edema  Vascular: no cyanosis/erythema  Skin: no rashes  MSK: no joint swelling.      LABS:    12-22    137  |  102  |  46<H>  ----------------------------<  126<H>  4.5   |  28  |  1.55<H>    Ca    8.4      22 Dec 2023 06:43  Phos  3.3     12-22  Mg     2.2     12-22    TPro  8.9<H>  /  Alb  2.3<L>  /  TBili  7.0<H>  /  DBili  4.7<H>  /  AST  117<H>  /  ALT  76<H>  /  AlkPhos  133<H>  12-22  LIVER FUNCTIONS - ( 22 Dec 2023 06:43 )  Alb: 2.3 g/dL / Pro: 8.9 g/dL / ALK PHOS: 133 U/L / ALT: 76 U/L / AST: 117 U/L / GGT: x           Urinalysis Basic - ( 22 Dec 2023 06:43 )    Color: x / Appearance: x / SG: x / pH: x  Gluc: 126 mg/dL / Ketone: x  / Bili: x / Urobili: x   Blood: x / Protein: x / Nitrite: x   Leuk Esterase: x / RBC: x / WBC x   Sq Epi: x / Non Sq Epi: x / Bacteria: x    CAPILLARY BLOOD GLUCOSE        MEDICATIONS  (STANDING):  chlorhexidine 2% Cloths 1 Application(s) Topical <User Schedule>  cholestyramine Powder (Sugar-Free) 4 Gram(s) Oral daily  epoetin matt-epbx (RETACRIT) Injectable 53808 Unit(s) SubCutaneous every 7 days  ergocalciferol 60110 Unit(s) Oral <User Schedule>  glucagon  Injectable 1 milliGRAM(s) IntraMuscular once  heparin   Injectable 5000 Unit(s) SubCutaneous every 8 hours  labetalol Injectable 10 milliGRAM(s) IV Push every 6 hours  levothyroxine Injectable 30 MICROGram(s) IV Push at bedtime  lipid, fat emulsion (Fish Oil and Plant Based) 20% Infusion 0.5359 Gm/kG/Day (20.8 mL/Hr) IV Continuous <Continuous>  Parenteral Nutrition - Adult 1 Each TPN Continuous <Continuous>  Parenteral Nutrition - Adult 1 Each TPN Continuous <Continuous>  ursodiol Suspension 300 milliGRAM(s) Oral every 8 hours  venlafaxine XR. 150 milliGRAM(s) Oral daily    MEDICATIONS  (PRN):  hydrALAZINE Injectable 10 milliGRAM(s) IV Push every 6 hours PRN SBP >170  lidocaine 2% Viscous 10 milliLiter(s) Swish and Spit every 12 hours PRN tongue pain  LORazepam   Injectable 0.5 milliGRAM(s) IV Push <User Schedule> PRN Anxiety  ondansetron Injectable 4 milliGRAM(s) IV Push every 6 hours PRN Nausea and/or Vomiting      Poole:	  [x ] None	[ ] Daily Poole Order Placed	   Indication:	  [ ] Strict I and O's    [ ] Obstruction     [ ] Incontinence + Stage 3 or 4 Decubitus  Central Line:  [ ] None	   [ x]  Medication / TPN Administration     [ ] No Peripheral IV

## 2023-12-22 NOTE — PROGRESS NOTE ADULT - NS ATTEND AMEND GEN_ALL_CORE FT
UC, AF s/p SBR with ileocolic resection, ileostomy, enteroatmospheric fistula, candidemia  physical as above  PICC site clean  continue TPN  continue metoprolol  dressing changes  try to increase activity

## 2023-12-22 NOTE — PROGRESS NOTE ADULT - SUBJECTIVE AND OBJECTIVE BOX
SUBJECTIVE:  Pt seen this AM on rounds. Pt felt well o/n. -n/-v    MEDICATIONS  (STANDING):  chlorhexidine 2% Cloths 1 Application(s) Topical <User Schedule>  cholestyramine Powder (Sugar-Free) 4 Gram(s) Oral daily  epoetin matt-epbx (RETACRIT) Injectable 63840 Unit(s) SubCutaneous every 7 days  ergocalciferol 75632 Unit(s) Oral <User Schedule>  glucagon  Injectable 1 milliGRAM(s) IntraMuscular once  heparin   Injectable 5000 Unit(s) SubCutaneous every 8 hours  labetalol Injectable 10 milliGRAM(s) IV Push every 6 hours  levothyroxine Injectable 30 MICROGram(s) IV Push at bedtime  lipid, fat emulsion (Fish Oil and Plant Based) 20% Infusion 0.5359 Gm/kG/Day (20.8 mL/Hr) IV Continuous <Continuous>  Parenteral Nutrition - Adult 1 Each TPN Continuous <Continuous>  ursodiol Suspension 300 milliGRAM(s) Oral every 8 hours  venlafaxine XR. 150 milliGRAM(s) Oral daily    MEDICATIONS  (PRN):  hydrALAZINE Injectable 10 milliGRAM(s) IV Push every 6 hours PRN SBP >170  lidocaine 2% Viscous 10 milliLiter(s) Swish and Spit every 12 hours PRN tongue pain  LORazepam   Injectable 0.5 milliGRAM(s) IV Push <User Schedule> PRN Anxiety  ondansetron Injectable 4 milliGRAM(s) IV Push every 6 hours PRN Nausea and/or Vomiting      Vital Signs Last 24 Hrs  T(C): 36.7 (22 Dec 2023 09:06), Max: 36.7 (22 Dec 2023 06:25)  T(F): 98 (22 Dec 2023 09:06), Max: 98.1 (22 Dec 2023 06:25)  HR: 79 (22 Dec 2023 10:00) (61 - 86)  BP: 135/62 (22 Dec 2023 10:00) (107/54 - 174/73)  BP(mean): 89 (22 Dec 2023 10:00) (72 - 111)  RR: 37 (22 Dec 2023 10:00) (18 - 38)  SpO2: 97% (22 Dec 2023 10:00) (92% - 100%)    Parameters below as of 22 Dec 2023 10:00  Patient On (Oxygen Delivery Method): room air        Physical Exam:  General: NAD, resting comfortably in bed  Pulmonary: Nonlabored breathing, no respiratory distress  Cardiovascular: NSR  Abdominal: soft, NT/ND  Extremities: WWP, normal strength  Neuro: A/O x 3, CNs II-XII grossly intact, no focal deficits    I&O's Summary    21 Dec 2023 07:01  -  22 Dec 2023 07:00  --------------------------------------------------------  IN: 3783.4 mL / OUT: 3170 mL / NET: 613.4 mL    22 Dec 2023 07:01  -  22 Dec 2023 10:39  --------------------------------------------------------  IN: 514 mL / OUT: 200 mL / NET: 314 mL        LABS:    12-22    137  |  102  |  46<H>  ----------------------------<  126<H>  4.5   |  28  |  1.55<H>    Ca    8.4      22 Dec 2023 06:43  Phos  3.3     12-22  Mg     2.2     12-22    TPro  8.9<H>  /  Alb  2.3<L>  /  TBili  7.0<H>  /  DBili  4.7<H>  /  AST  117<H>  /  ALT  76<H>  /  AlkPhos  133<H>  12-22      Urinalysis Basic - ( 22 Dec 2023 06:43 )    Color: x / Appearance: x / SG: x / pH: x  Gluc: 126 mg/dL / Ketone: x  / Bili: x / Urobili: x   Blood: x / Protein: x / Nitrite: x   Leuk Esterase: x / RBC: x / WBC x   Sq Epi: x / Non Sq Epi: x / Bacteria: x      CAPILLARY BLOOD GLUCOSE        LIVER FUNCTIONS - ( 22 Dec 2023 06:43 )  Alb: 2.3 g/dL / Pro: 8.9 g/dL / ALK PHOS: 133 U/L / ALT: 76 U/L / AST: 117 U/L / GGT: x             RADIOLOGY & ADDITIONAL STUDIES:   SUBJECTIVE:  Pt seen this AM on rounds. Pt felt well o/n. -n/-v    MEDICATIONS  (STANDING):  chlorhexidine 2% Cloths 1 Application(s) Topical <User Schedule>  cholestyramine Powder (Sugar-Free) 4 Gram(s) Oral daily  epoetin matt-epbx (RETACRIT) Injectable 16064 Unit(s) SubCutaneous every 7 days  ergocalciferol 12195 Unit(s) Oral <User Schedule>  glucagon  Injectable 1 milliGRAM(s) IntraMuscular once  heparin   Injectable 5000 Unit(s) SubCutaneous every 8 hours  labetalol Injectable 10 milliGRAM(s) IV Push every 6 hours  levothyroxine Injectable 30 MICROGram(s) IV Push at bedtime  lipid, fat emulsion (Fish Oil and Plant Based) 20% Infusion 0.5359 Gm/kG/Day (20.8 mL/Hr) IV Continuous <Continuous>  Parenteral Nutrition - Adult 1 Each TPN Continuous <Continuous>  ursodiol Suspension 300 milliGRAM(s) Oral every 8 hours  venlafaxine XR. 150 milliGRAM(s) Oral daily    MEDICATIONS  (PRN):  hydrALAZINE Injectable 10 milliGRAM(s) IV Push every 6 hours PRN SBP >170  lidocaine 2% Viscous 10 milliLiter(s) Swish and Spit every 12 hours PRN tongue pain  LORazepam   Injectable 0.5 milliGRAM(s) IV Push <User Schedule> PRN Anxiety  ondansetron Injectable 4 milliGRAM(s) IV Push every 6 hours PRN Nausea and/or Vomiting      Vital Signs Last 24 Hrs  T(C): 36.7 (22 Dec 2023 09:06), Max: 36.7 (22 Dec 2023 06:25)  T(F): 98 (22 Dec 2023 09:06), Max: 98.1 (22 Dec 2023 06:25)  HR: 79 (22 Dec 2023 10:00) (61 - 86)  BP: 135/62 (22 Dec 2023 10:00) (107/54 - 174/73)  BP(mean): 89 (22 Dec 2023 10:00) (72 - 111)  RR: 37 (22 Dec 2023 10:00) (18 - 38)  SpO2: 97% (22 Dec 2023 10:00) (92% - 100%)    Parameters below as of 22 Dec 2023 10:00  Patient On (Oxygen Delivery Method): room air        Physical Exam:  General: NAD, resting comfortably in bed  Pulmonary: Nonlabored breathing, no respiratory distress  Cardiovascular: NSR  Abdominal: soft, NT/ND  Extremities: WWP, normal strength  Neuro: A/O x 3, CNs II-XII grossly intact, no focal deficits    I&O's Summary    21 Dec 2023 07:01  -  22 Dec 2023 07:00  --------------------------------------------------------  IN: 3783.4 mL / OUT: 3170 mL / NET: 613.4 mL    22 Dec 2023 07:01  -  22 Dec 2023 10:39  --------------------------------------------------------  IN: 514 mL / OUT: 200 mL / NET: 314 mL        LABS:    12-22    137  |  102  |  46<H>  ----------------------------<  126<H>  4.5   |  28  |  1.55<H>    Ca    8.4      22 Dec 2023 06:43  Phos  3.3     12-22  Mg     2.2     12-22    TPro  8.9<H>  /  Alb  2.3<L>  /  TBili  7.0<H>  /  DBili  4.7<H>  /  AST  117<H>  /  ALT  76<H>  /  AlkPhos  133<H>  12-22      Urinalysis Basic - ( 22 Dec 2023 06:43 )    Color: x / Appearance: x / SG: x / pH: x  Gluc: 126 mg/dL / Ketone: x  / Bili: x / Urobili: x   Blood: x / Protein: x / Nitrite: x   Leuk Esterase: x / RBC: x / WBC x   Sq Epi: x / Non Sq Epi: x / Bacteria: x      CAPILLARY BLOOD GLUCOSE        LIVER FUNCTIONS - ( 22 Dec 2023 06:43 )  Alb: 2.3 g/dL / Pro: 8.9 g/dL / ALK PHOS: 133 U/L / ALT: 76 U/L / AST: 117 U/L / GGT: x             RADIOLOGY & ADDITIONAL STUDIES:

## 2023-12-23 NOTE — PROGRESS NOTE ADULT - NS ATTEND AMEND GEN_ALL_CORE FT
UC, AF s/p SBR, ileocolic resection, ileostomy, enteroatmospheric fistula  physical as above  sleeping better with ativan  continue TPN  continue metoprolol

## 2023-12-23 NOTE — PROGRESS NOTE ADULT - ASSESSMENT
78M w PMH Crohn's, AFib/Flutter s/p DCCVs on amiodarone, remote ileocectomy and open appendectomy. Admitted (6/23) for SBO vs Crohns flare, s/p NGT decompression and s/p lap converted to open TRE, SBR x 3, left in discontinuity with abthera vac on (6/27), RTOR for ileocolic resection, small bowel anastomosis, and abdominal wall closure on (6/28), c/b fluid collection s/p IR aspiration of perihepatic fluid on (7/3), c/b wound dehiscence s/p RTOR exlap, washout, ileocolic resection with end ileostomy, blow hole colostomy, red rubber from ileostomy to small bowel anastomosis; vicryl bridging mesh on (7/5) transferred to SICU postoperatively for hemodynamic monitoring, with hospital course complicated by periods of severe ELVI and hyponatremia, which resolved but stepped back up for to SICU on (9/10) for acute AMS, intermittent hypoglycemia, AFib with RVR. Percutaneous Cholecystostomy placed on (9/11) for enlarged GB, PCT capped 10/5 and uncapped 10/25 for hyperbilirubinemia, Right brachial DVT, left basillic and cephalic superficial thrombus on duplex 11/2, CT 11/14 with ALDEN ground glass opacity of unclear etiology, completed empiric 7day cefepime course 11/22, rising T-bili of unclear etilogy, now w resolved candida glabrata fungemia, ELVI    NEURO: AMS resolved, likely septic encephalopathy, EEG neg. Hx of depression - cont Effexor. Nausea: Zofran PRN. Anxiety: Lorazepam PRN.  CV: Hx Afib/flutter: s/p DCCV, Amio stopped due to persistent transaminitis. Continue labetalol IV 10q6h  with hold parameters. Hx HLD: Lipitor held given high LFTs. TTE  (7/18) - PASP 64mmHg, EF 65-70%, Hx HTN - stopped PO Norvasc as unlikely absorbing, holding Losartan. BP borderline - continue hydralazine PRN.   PULM: Atelectasis/dyspnea improved clinically: lower to 1 hr of BiPAP every 12hrs of nasal canula or room air. Encourage OOB and IS.  CT from 11/14 with ALDEN ground glass opacity of unclear etiology. COVID negative. RVP negative. completed 7d empiric cefepime 11/22 to cover for potential PNA.   GI/FEN: Low res, low fat, high protein diet. Cont Ensure max x1/day. Folate, thiamine. fungemia likely CLABSI. PICC replaced on 12/4 and continue TPN qd, lipids qd.  High output EC fistula: cont Octreotide & Cholestyramine 10/18. Transaminitis elevated T bili of unclear origin, s/p Perc Deb on 9/11; MRCP 10/30 no obstruction, seen by Hepatology started on ursodiol, RUQ US 11/25 CBD 5mm, hepatic vessels patent Repeat. MRCP neg. Monitor drainage from fistula, bolus as needed to keep I&O even.    : Voids. ELVI, Cr trending down. stopped famotidine and half Effexor dose given renal dysfunction. Caspo unlikely etiology of ELVI per ID. s/p brief bicarb gtt.  MARLO Duplex and RP US unremarkable, CK wnl.  f/u renal recs pending glomerulonephritis labs.  ENDO: Hx hypothyroid: IV Synthroid, TSH low on 11/30, decreased synthroid dose, repeat TSH 12/6 wnl.  ID: BCx 11/22 grew candida glabrata, likely CLABSI. ID consulted, s/p Caspo, switched to Fluconazole (12/1-12/9). Repeat surveillance blood cx NGTD. Ophthalmology evaulated - normal ocular exam. Echo no vegetation. PICC replaced on 12/4.  Recent MRSA swab negative, RVP panel negative, COVID negative. Cont Nystatin powder // PREVIOUSLY  caspofungin (11/24-12/1). completed empiric 7days cefepime (11/15-11/22), had C. tertium, Lactobacillis from his IR cx 7/3, and candida albicans, lactobacillus, vanc sensitive E faecium, vanc resistant E gallinarum, vanc resistant E casseliflavus, lactobacillus from his OR cx 7/5. Completed course of abx with Imipenem (9/10-9/15). Imipenem (8/26--) imipenem (6/30-7/12, 7/23-7/30), Dapto (6/30-7/5 and 7/23-7/24). Empiric dapto (8/23-24) and cefepime (8/23-24).   PPX: SCDs, SQH, was on Therapeutic lovenox bid for R brachial vein DVT, but will hold off on hep gtt since repeat US Duplex negative.  HEME: Anemia - continue Epogen weekly. S/p Iron Sucrose 200 qd x 3 days for chronic anemia.  LINES: ZAHEER Pinto PICC (12/4 - ) will plan to switch PICC once a month // DC: LUE PICC (9/1-11/1 ), RUE PICC: (11/1-11/24)   WOUNDS/DRAINS: Abdominal wound and fistula with wound manager in place dressing change Tuesday and Friday. RLQ Ostomy. IR perc deb tube. // DC: L Clay drain.  PT: Ambulate as tolerated OOB to chair daily.  DISPO: SICU due to complicated hospital course.

## 2023-12-23 NOTE — PROGRESS NOTE ADULT - SUBJECTIVE AND OBJECTIVE BOX
24 hr events:    ON: increased ativan to 1mg qhs  12/22: AMRITA    SUBJECTIVE:  Doing well today. No acute complaints. OOBTC.    MEDICATIONS  (STANDING):  chlorhexidine 2% Cloths 1 Application(s) Topical <User Schedule>  cholestyramine Powder (Sugar-Free) 4 Gram(s) Oral daily  epoetin matt-epbx (RETACRIT) Injectable 71119 Unit(s) SubCutaneous every 7 days  ergocalciferol 22227 Unit(s) Oral <User Schedule>  glucagon  Injectable 1 milliGRAM(s) IntraMuscular once  heparin   Injectable 5000 Unit(s) SubCutaneous every 8 hours  labetalol Injectable 10 milliGRAM(s) IV Push every 6 hours  levothyroxine Injectable 30 MICROGram(s) IV Push at bedtime  lipid, fat emulsion (Fish Oil and Plant Based) 20% Infusion 0.5359 Gm/kG/Day (20.83 mL/Hr) IV Continuous <Continuous>  Parenteral Nutrition - Adult 1 Each TPN Continuous <Continuous>  Parenteral Nutrition - Adult 1 Each TPN Continuous <Continuous>  ursodiol Suspension 300 milliGRAM(s) Oral every 8 hours  venlafaxine XR. 150 milliGRAM(s) Oral daily    MEDICATIONS  (PRN):  hydrALAZINE Injectable 10 milliGRAM(s) IV Push every 6 hours PRN SBP >170  lidocaine 2% Viscous 10 milliLiter(s) Swish and Spit every 12 hours PRN tongue pain  LORazepam   Injectable 1 milliGRAM(s) IV Push <User Schedule> PRN Anxiety  ondansetron Injectable 4 milliGRAM(s) IV Push every 6 hours PRN Nausea and/or Vomiting      ICU Vital Signs Last 24 Hrs  T(C): 36.8 (23 Dec 2023 09:00), Max: 36.8 (22 Dec 2023 13:34)  T(F): 98.3 (23 Dec 2023 09:00), Max: 98.3 (22 Dec 2023 13:34)  HR: 76 (23 Dec 2023 12:00) (74 - 88)  BP: 157/74 (23 Dec 2023 12:00) (132/61 - 177/77)  BP(mean): 106 (23 Dec 2023 12:00) (76 - 113)  ABP: --  ABP(mean): --  RR: 20 (23 Dec 2023 12:00) (18 - 29)  SpO2: 99% (23 Dec 2023 12:00) (90% - 100%)    O2 Parameters below as of 23 Dec 2023 12:00  Patient On (Oxygen Delivery Method): room air            Physical Exam:  Gen: NAD  ENT: mucus membrane moist  Cardiovascular: RRR  Respiratory: CTA  Gastrointestinal: soft, NT, ND, BS+, fistula thickish yellowish formed output, has punctate bleeding from wound bed. perc cony bilious  Extremities: warm, no dependent edema  Neurological: AAOx3, CNII-XII intact,  strength 5/5 b/l      I&O's Summary    22 Dec 2023 07:01  -  23 Dec 2023 07:00  --------------------------------------------------------  IN: 3180.4 mL / OUT: 3330 mL / NET: -149.6 mL    23 Dec 2023 07:01  -  23 Dec 2023 12:16  --------------------------------------------------------  IN: 1000 mL / OUT: 1075 mL / NET: -75 mL        LABS:    12-22    137  |  102  |  46<H>  ----------------------------<  126<H>  4.5   |  28  |  1.55<H>    Ca    8.4      22 Dec 2023 06:43  Phos  3.3     12-22  Mg     2.2     12-22    TPro  8.9<H>  /  Alb  2.3<L>  /  TBili  7.0<H>  /  DBili  4.7<H>  /  AST  117<H>  /  ALT  76<H>  /  AlkPhos  133<H>  12-22      Urinalysis Basic - ( 22 Dec 2023 06:43 )    Color: x / Appearance: x / SG: x / pH: x  Gluc: 126 mg/dL / Ketone: x  / Bili: x / Urobili: x   Blood: x / Protein: x / Nitrite: x   Leuk Esterase: x / RBC: x / WBC x   Sq Epi: x / Non Sq Epi: x / Bacteria: x     24 hr events:    ON: increased ativan to 1mg qhs  12/22: AMRITA    SUBJECTIVE:  Doing well today. No acute complaints. OOBTC.    MEDICATIONS  (STANDING):  chlorhexidine 2% Cloths 1 Application(s) Topical <User Schedule>  cholestyramine Powder (Sugar-Free) 4 Gram(s) Oral daily  epoetin matt-epbx (RETACRIT) Injectable 08848 Unit(s) SubCutaneous every 7 days  ergocalciferol 43346 Unit(s) Oral <User Schedule>  glucagon  Injectable 1 milliGRAM(s) IntraMuscular once  heparin   Injectable 5000 Unit(s) SubCutaneous every 8 hours  labetalol Injectable 10 milliGRAM(s) IV Push every 6 hours  levothyroxine Injectable 30 MICROGram(s) IV Push at bedtime  lipid, fat emulsion (Fish Oil and Plant Based) 20% Infusion 0.5359 Gm/kG/Day (20.83 mL/Hr) IV Continuous <Continuous>  Parenteral Nutrition - Adult 1 Each TPN Continuous <Continuous>  Parenteral Nutrition - Adult 1 Each TPN Continuous <Continuous>  ursodiol Suspension 300 milliGRAM(s) Oral every 8 hours  venlafaxine XR. 150 milliGRAM(s) Oral daily    MEDICATIONS  (PRN):  hydrALAZINE Injectable 10 milliGRAM(s) IV Push every 6 hours PRN SBP >170  lidocaine 2% Viscous 10 milliLiter(s) Swish and Spit every 12 hours PRN tongue pain  LORazepam   Injectable 1 milliGRAM(s) IV Push <User Schedule> PRN Anxiety  ondansetron Injectable 4 milliGRAM(s) IV Push every 6 hours PRN Nausea and/or Vomiting      ICU Vital Signs Last 24 Hrs  T(C): 36.8 (23 Dec 2023 09:00), Max: 36.8 (22 Dec 2023 13:34)  T(F): 98.3 (23 Dec 2023 09:00), Max: 98.3 (22 Dec 2023 13:34)  HR: 76 (23 Dec 2023 12:00) (74 - 88)  BP: 157/74 (23 Dec 2023 12:00) (132/61 - 177/77)  BP(mean): 106 (23 Dec 2023 12:00) (76 - 113)  ABP: --  ABP(mean): --  RR: 20 (23 Dec 2023 12:00) (18 - 29)  SpO2: 99% (23 Dec 2023 12:00) (90% - 100%)    O2 Parameters below as of 23 Dec 2023 12:00  Patient On (Oxygen Delivery Method): room air            Physical Exam:  Gen: NAD  ENT: mucus membrane moist  Cardiovascular: RRR  Respiratory: CTA  Gastrointestinal: soft, NT, ND, BS+, fistula thickish yellowish formed output, has punctate bleeding from wound bed. perc cony bilious  Extremities: warm, no dependent edema  Neurological: AAOx3, CNII-XII intact,  strength 5/5 b/l      I&O's Summary    22 Dec 2023 07:01  -  23 Dec 2023 07:00  --------------------------------------------------------  IN: 3180.4 mL / OUT: 3330 mL / NET: -149.6 mL    23 Dec 2023 07:01  -  23 Dec 2023 12:16  --------------------------------------------------------  IN: 1000 mL / OUT: 1075 mL / NET: -75 mL        LABS:    12-22    137  |  102  |  46<H>  ----------------------------<  126<H>  4.5   |  28  |  1.55<H>    Ca    8.4      22 Dec 2023 06:43  Phos  3.3     12-22  Mg     2.2     12-22    TPro  8.9<H>  /  Alb  2.3<L>  /  TBili  7.0<H>  /  DBili  4.7<H>  /  AST  117<H>  /  ALT  76<H>  /  AlkPhos  133<H>  12-22      Urinalysis Basic - ( 22 Dec 2023 06:43 )    Color: x / Appearance: x / SG: x / pH: x  Gluc: 126 mg/dL / Ketone: x  / Bili: x / Urobili: x   Blood: x / Protein: x / Nitrite: x   Leuk Esterase: x / RBC: x / WBC x   Sq Epi: x / Non Sq Epi: x / Bacteria: x     24 hr events:    ON: increased ativan to 1mg qhs  12/22: AMRITA    SUBJECTIVE:  Doing well today. No acute complaints. OOBTC. NO SOB or CP, some anxiety at night    MEDICATIONS  (STANDING):  chlorhexidine 2% Cloths 1 Application(s) Topical <User Schedule>  cholestyramine Powder (Sugar-Free) 4 Gram(s) Oral daily  epoetin matt-epbx (RETACRIT) Injectable 10638 Unit(s) SubCutaneous every 7 days  ergocalciferol 09763 Unit(s) Oral <User Schedule>  glucagon  Injectable 1 milliGRAM(s) IntraMuscular once  heparin   Injectable 5000 Unit(s) SubCutaneous every 8 hours  labetalol Injectable 10 milliGRAM(s) IV Push every 6 hours  levothyroxine Injectable 30 MICROGram(s) IV Push at bedtime  lipid, fat emulsion (Fish Oil and Plant Based) 20% Infusion 0.5359 Gm/kG/Day (20.83 mL/Hr) IV Continuous <Continuous>  Parenteral Nutrition - Adult 1 Each TPN Continuous <Continuous>  Parenteral Nutrition - Adult 1 Each TPN Continuous <Continuous>  ursodiol Suspension 300 milliGRAM(s) Oral every 8 hours  venlafaxine XR. 150 milliGRAM(s) Oral daily    MEDICATIONS  (PRN):  hydrALAZINE Injectable 10 milliGRAM(s) IV Push every 6 hours PRN SBP >170  lidocaine 2% Viscous 10 milliLiter(s) Swish and Spit every 12 hours PRN tongue pain  LORazepam   Injectable 1 milliGRAM(s) IV Push <User Schedule> PRN Anxiety  ondansetron Injectable 4 milliGRAM(s) IV Push every 6 hours PRN Nausea and/or Vomiting      ICU Vital Signs Last 24 Hrs  T(C): 36.8 (23 Dec 2023 09:00), Max: 36.8 (22 Dec 2023 13:34)  T(F): 98.3 (23 Dec 2023 09:00), Max: 98.3 (22 Dec 2023 13:34)  HR: 76 (23 Dec 2023 12:00) (74 - 88)  BP: 157/74 (23 Dec 2023 12:00) (132/61 - 177/77)  BP(mean): 106 (23 Dec 2023 12:00) (76 - 113)  ABP: --  ABP(mean): --  RR: 20 (23 Dec 2023 12:00) (18 - 29)  SpO2: 99% (23 Dec 2023 12:00) (90% - 100%)    O2 Parameters below as of 23 Dec 2023 12:00  Patient On (Oxygen Delivery Method): room air            Physical Exam:  Gen: NAD  ENT: mucus membrane moist  Cardiovascular: RRR  Respiratory: CTA  Gastrointestinal: soft, NT, ND, BS+, fistula thickish yellowish formed output, has punctate bleeding from wound bed. perc cony bilious  Extremities: warm, no dependent edema  Neurological: AAOx3, CNII-XII intact,  strength 5/5 b/l      I&O's Summary    22 Dec 2023 07:01  -  23 Dec 2023 07:00  --------------------------------------------------------  IN: 3180.4 mL / OUT: 3330 mL / NET: -149.6 mL    23 Dec 2023 07:01  -  23 Dec 2023 12:16  --------------------------------------------------------  IN: 1000 mL / OUT: 1075 mL / NET: -75 mL        LABS:    12-22    137  |  102  |  46<H>  ----------------------------<  126<H>  4.5   |  28  |  1.55<H>    Ca    8.4      22 Dec 2023 06:43  Phos  3.3     12-22  Mg     2.2     12-22    TPro  8.9<H>  /  Alb  2.3<L>  /  TBili  7.0<H>  /  DBili  4.7<H>  /  AST  117<H>  /  ALT  76<H>  /  AlkPhos  133<H>  12-22      Urinalysis Basic - ( 22 Dec 2023 06:43 )    Color: x / Appearance: x / SG: x / pH: x  Gluc: 126 mg/dL / Ketone: x  / Bili: x / Urobili: x   Blood: x / Protein: x / Nitrite: x   Leuk Esterase: x / RBC: x / WBC x   Sq Epi: x / Non Sq Epi: x / Bacteria: x     24 hr events:    ON: increased ativan to 1mg qhs  12/22: AMRITA    SUBJECTIVE:  Doing well today. No acute complaints. OOBTC. NO SOB or CP, some anxiety at night    MEDICATIONS  (STANDING):  chlorhexidine 2% Cloths 1 Application(s) Topical <User Schedule>  cholestyramine Powder (Sugar-Free) 4 Gram(s) Oral daily  epoetin matt-epbx (RETACRIT) Injectable 77630 Unit(s) SubCutaneous every 7 days  ergocalciferol 08733 Unit(s) Oral <User Schedule>  glucagon  Injectable 1 milliGRAM(s) IntraMuscular once  heparin   Injectable 5000 Unit(s) SubCutaneous every 8 hours  labetalol Injectable 10 milliGRAM(s) IV Push every 6 hours  levothyroxine Injectable 30 MICROGram(s) IV Push at bedtime  lipid, fat emulsion (Fish Oil and Plant Based) 20% Infusion 0.5359 Gm/kG/Day (20.83 mL/Hr) IV Continuous <Continuous>  Parenteral Nutrition - Adult 1 Each TPN Continuous <Continuous>  Parenteral Nutrition - Adult 1 Each TPN Continuous <Continuous>  ursodiol Suspension 300 milliGRAM(s) Oral every 8 hours  venlafaxine XR. 150 milliGRAM(s) Oral daily    MEDICATIONS  (PRN):  hydrALAZINE Injectable 10 milliGRAM(s) IV Push every 6 hours PRN SBP >170  lidocaine 2% Viscous 10 milliLiter(s) Swish and Spit every 12 hours PRN tongue pain  LORazepam   Injectable 1 milliGRAM(s) IV Push <User Schedule> PRN Anxiety  ondansetron Injectable 4 milliGRAM(s) IV Push every 6 hours PRN Nausea and/or Vomiting      ICU Vital Signs Last 24 Hrs  T(C): 36.8 (23 Dec 2023 09:00), Max: 36.8 (22 Dec 2023 13:34)  T(F): 98.3 (23 Dec 2023 09:00), Max: 98.3 (22 Dec 2023 13:34)  HR: 76 (23 Dec 2023 12:00) (74 - 88)  BP: 157/74 (23 Dec 2023 12:00) (132/61 - 177/77)  BP(mean): 106 (23 Dec 2023 12:00) (76 - 113)  ABP: --  ABP(mean): --  RR: 20 (23 Dec 2023 12:00) (18 - 29)  SpO2: 99% (23 Dec 2023 12:00) (90% - 100%)    O2 Parameters below as of 23 Dec 2023 12:00  Patient On (Oxygen Delivery Method): room air            Physical Exam:  Gen: NAD  ENT: mucus membrane moist  Cardiovascular: RRR  Respiratory: CTA  Gastrointestinal: soft, NT, ND, BS+, fistula thickish yellowish formed output, has punctate bleeding from wound bed. perc cony bilious  Extremities: warm, no dependent edema  Neurological: AAOx3, CNII-XII intact,  strength 5/5 b/l      I&O's Summary    22 Dec 2023 07:01  -  23 Dec 2023 07:00  --------------------------------------------------------  IN: 3180.4 mL / OUT: 3330 mL / NET: -149.6 mL    23 Dec 2023 07:01  -  23 Dec 2023 12:16  --------------------------------------------------------  IN: 1000 mL / OUT: 1075 mL / NET: -75 mL        LABS:    12-22    137  |  102  |  46<H>  ----------------------------<  126<H>  4.5   |  28  |  1.55<H>    Ca    8.4      22 Dec 2023 06:43  Phos  3.3     12-22  Mg     2.2     12-22    TPro  8.9<H>  /  Alb  2.3<L>  /  TBili  7.0<H>  /  DBili  4.7<H>  /  AST  117<H>  /  ALT  76<H>  /  AlkPhos  133<H>  12-22      Urinalysis Basic - ( 22 Dec 2023 06:43 )    Color: x / Appearance: x / SG: x / pH: x  Gluc: 126 mg/dL / Ketone: x  / Bili: x / Urobili: x   Blood: x / Protein: x / Nitrite: x   Leuk Esterase: x / RBC: x / WBC x   Sq Epi: x / Non Sq Epi: x / Bacteria: x

## 2023-12-23 NOTE — PROGRESS NOTE ADULT - SUBJECTIVE AND OBJECTIVE BOX
INTERVAL HPI/OVERNIGHT EVENTS:    STATUS POST:      POST OPERATIVE DAY #:     SUBJECTIVE:      heparin   Injectable 5000 Unit(s) SubCutaneous every 8 hours  hydrALAZINE Injectable 10 milliGRAM(s) IV Push every 6 hours PRN  labetalol Injectable 10 milliGRAM(s) IV Push every 6 hours      Vital Signs Last 24 Hrs  T(C): 36.6 (23 Dec 2023 05:00), Max: 36.8 (22 Dec 2023 13:34)  T(F): 97.9 (23 Dec 2023 05:00), Max: 98.3 (22 Dec 2023 13:34)  HR: 82 (23 Dec 2023 06:01) (74 - 88)  BP: 160/72 (23 Dec 2023 06:00) (132/61 - 185/75)  BP(mean): 104 (23 Dec 2023 06:00) (76 - 108)  RR: 18 (23 Dec 2023 06:01) (18 - 37)  SpO2: 100% (23 Dec 2023 06:01) (90% - 100%)    Parameters below as of 23 Dec 2023 06:01  Patient On (Oxygen Delivery Method): room air      I&O's Detail    21 Dec 2023 07:01  -  22 Dec 2023 07:00  --------------------------------------------------------  IN:    Fat Emulsion (Fish Oil &amp; Plant Based) 20% Infusion: 270.4 mL    Oral Fluid: 1350 mL    TPN (Total Parenteral Nutrition): 2163 mL  Total IN: 3783.4 mL    OUT:    Drain (mL): 650 mL    Drain (mL): 700 mL    Drain (mL): 70 mL    Fat Emulsion (Fish Oil &amp; Plant Based) 20% Infusion: 0 mL    Voided (mL): 1750 mL  Total OUT: 3170 mL    Total NET: 613.4 mL      22 Dec 2023 07:01  -  23 Dec 2023 06:54  --------------------------------------------------------  IN:    Fat Emulsion (Fish Oil &amp; Plant Based) 20% Infusion: 166.4 mL    Oral Fluid: 960 mL    TPN (Total Parenteral Nutrition): 2068 mL  Total IN: 3194.4 mL    OUT:    Drain (mL): 875 mL    Drain (mL): 1120 mL    Drain (mL): 0 mL    Fat Emulsion (Fish Oil &amp; Plant Based) 20% Infusion: 0 mL    Voided (mL): 1335 mL  Total OUT: 3330 mL    Total NET: -135.6 mL          General: NAD, resting comfortably in bed  C/V: NSR  Pulm: Nonlabored breathing, no respiratory distress  Abd: soft, NT/ND.  Extrem: WWP, no edema, SCDs in place  Drains:  [place]:      LABS:    12-22    137  |  102  |  46<H>  ----------------------------<  126<H>  4.5   |  28  |  1.55<H>    Ca    8.4      22 Dec 2023 06:43  Phos  3.3     12-22  Mg     2.2     12-22    TPro  8.9<H>  /  Alb  2.3<L>  /  TBili  7.0<H>  /  DBili  4.7<H>  /  AST  117<H>  /  ALT  76<H>  /  AlkPhos  133<H>  12-22      Urinalysis Basic - ( 22 Dec 2023 06:43 )    Color: x / Appearance: x / SG: x / pH: x  Gluc: 126 mg/dL / Ketone: x  / Bili: x / Urobili: x   Blood: x / Protein: x / Nitrite: x   Leuk Esterase: x / RBC: x / WBC x   Sq Epi: x / Non Sq Epi: x / Bacteria: x        RADIOLOGY & ADDITIONAL STUDIES:   INTERVAL HPI/OVERNIGHT EVENTS: increased ativan to 1mg qhs    SUBJECTIVE: Patient seen and examined at bedside with chief resident.       heparin   Injectable 5000 Unit(s) SubCutaneous every 8 hours  hydrALAZINE Injectable 10 milliGRAM(s) IV Push every 6 hours PRN  labetalol Injectable 10 milliGRAM(s) IV Push every 6 hours      Vital Signs Last 24 Hrs  T(C): 36.6 (23 Dec 2023 05:00), Max: 36.8 (22 Dec 2023 13:34)  T(F): 97.9 (23 Dec 2023 05:00), Max: 98.3 (22 Dec 2023 13:34)  HR: 82 (23 Dec 2023 06:01) (74 - 88)  BP: 160/72 (23 Dec 2023 06:00) (132/61 - 185/75)  BP(mean): 104 (23 Dec 2023 06:00) (76 - 108)  RR: 18 (23 Dec 2023 06:01) (18 - 37)  SpO2: 100% (23 Dec 2023 06:01) (90% - 100%)    Parameters below as of 23 Dec 2023 06:01  Patient On (Oxygen Delivery Method): room air      I&O's Detail    21 Dec 2023 07:01  -  22 Dec 2023 07:00  --------------------------------------------------------  IN:    Fat Emulsion (Fish Oil &amp; Plant Based) 20% Infusion: 270.4 mL    Oral Fluid: 1350 mL    TPN (Total Parenteral Nutrition): 2163 mL  Total IN: 3783.4 mL    OUT:    Drain (mL): 650 mL    Drain (mL): 700 mL    Drain (mL): 70 mL    Fat Emulsion (Fish Oil &amp; Plant Based) 20% Infusion: 0 mL    Voided (mL): 1750 mL  Total OUT: 3170 mL    Total NET: 613.4 mL      22 Dec 2023 07:01  -  23 Dec 2023 06:54  --------------------------------------------------------  IN:    Fat Emulsion (Fish Oil &amp; Plant Based) 20% Infusion: 166.4 mL    Oral Fluid: 960 mL    TPN (Total Parenteral Nutrition): 2068 mL  Total IN: 3194.4 mL    OUT:    Drain (mL): 875 mL    Drain (mL): 1120 mL    Drain (mL): 0 mL    Fat Emulsion (Fish Oil &amp; Plant Based) 20% Infusion: 0 mL    Voided (mL): 1335 mL  Total OUT: 3330 mL    Total NET: -135.6 mL          General: NAD, resting comfortably in bed  C/V: NSR  Pulm: Nonlabored breathing, no respiratory distress  Abd: soft, NT/ND.  Extrem: WWP, no edema, SCDs in place  Drains:  [place]:      LABS:    12-22    137  |  102  |  46<H>  ----------------------------<  126<H>  4.5   |  28  |  1.55<H>    Ca    8.4      22 Dec 2023 06:43  Phos  3.3     12-22  Mg     2.2     12-22    TPro  8.9<H>  /  Alb  2.3<L>  /  TBili  7.0<H>  /  DBili  4.7<H>  /  AST  117<H>  /  ALT  76<H>  /  AlkPhos  133<H>  12-22      Urinalysis Basic - ( 22 Dec 2023 06:43 )    Color: x / Appearance: x / SG: x / pH: x  Gluc: 126 mg/dL / Ketone: x  / Bili: x / Urobili: x   Blood: x / Protein: x / Nitrite: x   Leuk Esterase: x / RBC: x / WBC x   Sq Epi: x / Non Sq Epi: x / Bacteria: x        RADIOLOGY & ADDITIONAL STUDIES:   INTERVAL HPI/OVERNIGHT EVENTS: increased ativan to 1mg qhs    SUBJECTIVE: Patient seen and examined at bedside with chief resident. Patient denies any acute complaints.      heparin   Injectable 5000 Unit(s) SubCutaneous every 8 hours  hydrALAZINE Injectable 10 milliGRAM(s) IV Push every 6 hours PRN  labetalol Injectable 10 milliGRAM(s) IV Push every 6 hours      Vital Signs Last 24 Hrs  T(C): 36.6 (23 Dec 2023 05:00), Max: 36.8 (22 Dec 2023 13:34)  T(F): 97.9 (23 Dec 2023 05:00), Max: 98.3 (22 Dec 2023 13:34)  HR: 82 (23 Dec 2023 06:01) (74 - 88)  BP: 160/72 (23 Dec 2023 06:00) (132/61 - 185/75)  BP(mean): 104 (23 Dec 2023 06:00) (76 - 108)  RR: 18 (23 Dec 2023 06:01) (18 - 37)  SpO2: 100% (23 Dec 2023 06:01) (90% - 100%)    Parameters below as of 23 Dec 2023 06:01  Patient On (Oxygen Delivery Method): room air      I&O's Detail    21 Dec 2023 07:01  -  22 Dec 2023 07:00  --------------------------------------------------------  IN:    Fat Emulsion (Fish Oil &amp; Plant Based) 20% Infusion: 270.4 mL    Oral Fluid: 1350 mL    TPN (Total Parenteral Nutrition): 2163 mL  Total IN: 3783.4 mL    OUT:    Drain (mL): 650 mL    Drain (mL): 700 mL    Drain (mL): 70 mL    Fat Emulsion (Fish Oil &amp; Plant Based) 20% Infusion: 0 mL    Voided (mL): 1750 mL  Total OUT: 3170 mL    Total NET: 613.4 mL      22 Dec 2023 07:01  -  23 Dec 2023 06:54  --------------------------------------------------------  IN:    Fat Emulsion (Fish Oil &amp; Plant Based) 20% Infusion: 166.4 mL    Oral Fluid: 960 mL    TPN (Total Parenteral Nutrition): 2068 mL  Total IN: 3194.4 mL    OUT:    Drain (mL): 875 mL    Drain (mL): 1120 mL    Drain (mL): 0 mL    Fat Emulsion (Fish Oil &amp; Plant Based) 20% Infusion: 0 mL    Voided (mL): 1335 mL  Total OUT: 3330 mL    Total NET: -135.6 mL          PHYSICAL EXAM:   Neurological: AAOx3, CNII-XII intact,  strength 5/5 b/l  ENT: mucus membrane moist  Cardiovascular: RRR  Respiratory: CTA  Gastrointestinal: soft, NT, ND, BS+, fistula thickish yellowish formed output, has punctate bleeding from wound bed. perc cony bilious,  Extremities: warm, no dependent edema  Vascular: no cyanosis/erythema  Skin: no rashes  MSK: no joint swelling.    LABS:    12-22    137  |  102  |  46<H>  ----------------------------<  126<H>  4.5   |  28  |  1.55<H>    Ca    8.4      22 Dec 2023 06:43  Phos  3.3     12-22  Mg     2.2     12-22    TPro  8.9<H>  /  Alb  2.3<L>  /  TBili  7.0<H>  /  DBili  4.7<H>  /  AST  117<H>  /  ALT  76<H>  /  AlkPhos  133<H>  12-22      Urinalysis Basic - ( 22 Dec 2023 06:43 )    Color: x / Appearance: x / SG: x / pH: x  Gluc: 126 mg/dL / Ketone: x  / Bili: x / Urobili: x   Blood: x / Protein: x / Nitrite: x   Leuk Esterase: x / RBC: x / WBC x   Sq Epi: x / Non Sq Epi: x / Bacteria: x        RADIOLOGY & ADDITIONAL STUDIES:

## 2023-12-24 NOTE — PROGRESS NOTE ADULT - NS ATTEND AMEND GEN_ALL_CORE FT
UC, AF SBR, ileocolic resection, ileostomy, enteroatmospheric fistula  physical as above  continue TPN  transfuse one unit PRBC  continue metoprolol

## 2023-12-24 NOTE — PROGRESS NOTE ADULT - ASSESSMENT
77 yo M with prolonged hospital course, initially admitted for SBO and CD flare s/p multiple post op complications now with recurrent non-oliguric sATN 2/2 candidemia, Cr now stable.     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 Allow BP to be 130-150s systolic   - Maintain MAP>70 for adequate renal perfusion  - Maintain net even fluid status  - Encourage PO intake. Also on parenteral nutrition  - Daily BMP  - Strict I&O, daily weights   positive

## 2023-12-24 NOTE — PROGRESS NOTE ADULT - SUBJECTIVE AND OBJECTIVE BOX
INTERVAL/OVERNIGHT EVENTS: Dustin pouch leaking and small pinpoint area bleeding, resolved after pressure placed and wound manager replaced. 500cc bolus given for "feeling dehydrated."    SUBJECTIVE: This AM, doing well without complaints. No abd pain. No N/V or CP/SOB.    Neurologic Medications  LORazepam   Injectable 1 milliGRAM(s) IV Push <User Schedule> PRN Anxiety  ondansetron Injectable 4 milliGRAM(s) IV Push every 6 hours PRN Nausea and/or Vomiting  venlafaxine XR. 150 milliGRAM(s) Oral daily    Cardiovascular Medications  hydrALAZINE Injectable 10 milliGRAM(s) IV Push every 6 hours PRN SBP >170  labetalol Injectable 10 milliGRAM(s) IV Push every 6 hours    Gastrointestinal Medications  ergocalciferol 94104 Unit(s) Oral <User Schedule>  lipid, fat emulsion (Fish Oil and Plant Based) 20% Infusion 0.5359 Gm/kG/Day IV Continuous <Continuous>  lipid, fat emulsion (Fish Oil and Plant Based) 20% Infusion 0.5359 Gm/kG/Day IV Continuous <Continuous>  Parenteral Nutrition - Adult 1 Each TPN Continuous <Continuous>  ursodiol Suspension 300 milliGRAM(s) Oral every 8 hours    Hematologic/Oncologic Medications  epoetin matt-epbx (RETACRIT) Injectable 92269 Unit(s) SubCutaneous every 7 days  heparin   Injectable 5000 Unit(s) SubCutaneous every 8 hours    Endocrine/Metabolic Medications  cholestyramine Powder (Sugar-Free) 4 Gram(s) Oral daily  glucagon  Injectable 1 milliGRAM(s) IntraMuscular once  levothyroxine Injectable 30 MICROGram(s) IV Push at bedtime    Topical/Other Medications  chlorhexidine 2% Cloths 1 Application(s) Topical <User Schedule>  lidocaine 2% Viscous 10 milliLiter(s) Swish and Spit every 12 hours PRN tongue pain    MEDICATIONS  (PRN):  hydrALAZINE Injectable 10 milliGRAM(s) IV Push every 6 hours PRN SBP >170  lidocaine 2% Viscous 10 milliLiter(s) Swish and Spit every 12 hours PRN tongue pain  LORazepam   Injectable 1 milliGRAM(s) IV Push <User Schedule> PRN Anxiety  ondansetron Injectable 4 milliGRAM(s) IV Push every 6 hours PRN Nausea and/or Vomiting    I&O's Detail    23 Dec 2023 07:01  -  24 Dec 2023 07:00  --------------------------------------------------------  IN:    Fat Emulsion (Fish Oil &amp; Plant Based) 20% Infusion: 270.4 mL    IV PiggyBack: 1500 mL    Oral Fluid: 80 mL    TPN (Total Parenteral Nutrition): 2056 mL  Total IN: 3906.4 mL    OUT:    Drain (mL): 1000 mL    Drain (mL): 945 mL    Fat Emulsion (Fish Oil &amp; Plant Based) 20% Infusion: 0 mL    Voided (mL): 1765 mL  Total OUT: 3710 mL    Total NET: 196.4 mL    24 Dec 2023 07:01  -  24 Dec 2023 10:10  --------------------------------------------------------  IN:    PRBCs (Packed Red Blood Cells): 300 mL    TPN (Total Parenteral Nutrition): 276 mL  Total IN: 576 mL    OUT:    Drain (mL): 75 mL    Voided (mL): 220 mL  Total OUT: 295 mL    Total NET: 281 mL    Vital Signs Last 24 Hrs  T(C): 36.7 (24 Dec 2023 09:00), Max: 36.7 (24 Dec 2023 09:00)  T(F): 98.1 (24 Dec 2023 09:00), Max: 98.1 (24 Dec 2023 09:00)  HR: 82 (24 Dec 2023 10:00) (57 - 84)  BP: 159/69 (24 Dec 2023 09:00) (123/58 - 174/74)  BP(mean): 99 (24 Dec 2023 09:00) (82 - 121)  RR: 22 (24 Dec 2023 10:00) (19 - 30)  SpO2: 97% (24 Dec 2023 10:00) (93% - 100%)    Parameters below as of 24 Dec 2023 10:00  Patient On (Oxygen Delivery Method): room air    Gen: NAD  ENT: mucus membrane moist  Cardiovascular: RRR  Respiratory: CTA  Gastrointestinal: soft, NT, ND, BS+, fistula thickish yellowish formed output, wound manager in place. perc cony bilious.  Extremities: warm, no dependent edema  Neurological: AAOx3, CNII-XII intact,  strength 5/5 b/l    LABS:                        7.3    7.51  )-----------( 249      ( 24 Dec 2023 05:51 )             23.4     12-24    135  |  101  |  44<H>  ----------------------------<  137<H>  4.3   |  27  |  1.56<H>    Ca    8.5      24 Dec 2023 05:51  Phos  3.3     12-24  Mg     2.1     12-24    TPro  8.5<H>  /  Alb  2.3<L>  /  TBili  6.5<H>  /  DBili  4.2<H>  /  AST  123<H>  /  ALT  78<H>  /  AlkPhos  131<H>  12-24    Urinalysis Basic - ( 24 Dec 2023 05:51 )    Color: x / Appearance: x / SG: x / pH: x  Gluc: 137 mg/dL / Ketone: x  / Bili: x / Urobili: x   Blood: x / Protein: x / Nitrite: x   Leuk Esterase: x / RBC: x / WBC x   Sq Epi: x / Non Sq Epi: x / Bacteria: x   INTERVAL/OVERNIGHT EVENTS: Dustin pouch leaking and small pinpoint area bleeding, resolved after pressure placed and wound manager replaced. 500cc bolus given for "feeling dehydrated."    SUBJECTIVE: This AM, doing well without complaints. No abd pain. No N/V or CP/SOB.    Neurologic Medications  LORazepam   Injectable 1 milliGRAM(s) IV Push <User Schedule> PRN Anxiety  ondansetron Injectable 4 milliGRAM(s) IV Push every 6 hours PRN Nausea and/or Vomiting  venlafaxine XR. 150 milliGRAM(s) Oral daily    Cardiovascular Medications  hydrALAZINE Injectable 10 milliGRAM(s) IV Push every 6 hours PRN SBP >170  labetalol Injectable 10 milliGRAM(s) IV Push every 6 hours    Gastrointestinal Medications  ergocalciferol 55909 Unit(s) Oral <User Schedule>  lipid, fat emulsion (Fish Oil and Plant Based) 20% Infusion 0.5359 Gm/kG/Day IV Continuous <Continuous>  lipid, fat emulsion (Fish Oil and Plant Based) 20% Infusion 0.5359 Gm/kG/Day IV Continuous <Continuous>  Parenteral Nutrition - Adult 1 Each TPN Continuous <Continuous>  ursodiol Suspension 300 milliGRAM(s) Oral every 8 hours    Hematologic/Oncologic Medications  epoetin matt-epbx (RETACRIT) Injectable 56326 Unit(s) SubCutaneous every 7 days  heparin   Injectable 5000 Unit(s) SubCutaneous every 8 hours    Endocrine/Metabolic Medications  cholestyramine Powder (Sugar-Free) 4 Gram(s) Oral daily  glucagon  Injectable 1 milliGRAM(s) IntraMuscular once  levothyroxine Injectable 30 MICROGram(s) IV Push at bedtime    Topical/Other Medications  chlorhexidine 2% Cloths 1 Application(s) Topical <User Schedule>  lidocaine 2% Viscous 10 milliLiter(s) Swish and Spit every 12 hours PRN tongue pain    MEDICATIONS  (PRN):  hydrALAZINE Injectable 10 milliGRAM(s) IV Push every 6 hours PRN SBP >170  lidocaine 2% Viscous 10 milliLiter(s) Swish and Spit every 12 hours PRN tongue pain  LORazepam   Injectable 1 milliGRAM(s) IV Push <User Schedule> PRN Anxiety  ondansetron Injectable 4 milliGRAM(s) IV Push every 6 hours PRN Nausea and/or Vomiting    I&O's Detail    23 Dec 2023 07:01  -  24 Dec 2023 07:00  --------------------------------------------------------  IN:    Fat Emulsion (Fish Oil &amp; Plant Based) 20% Infusion: 270.4 mL    IV PiggyBack: 1500 mL    Oral Fluid: 80 mL    TPN (Total Parenteral Nutrition): 2056 mL  Total IN: 3906.4 mL    OUT:    Drain (mL): 1000 mL    Drain (mL): 945 mL    Fat Emulsion (Fish Oil &amp; Plant Based) 20% Infusion: 0 mL    Voided (mL): 1765 mL  Total OUT: 3710 mL    Total NET: 196.4 mL    24 Dec 2023 07:01  -  24 Dec 2023 10:10  --------------------------------------------------------  IN:    PRBCs (Packed Red Blood Cells): 300 mL    TPN (Total Parenteral Nutrition): 276 mL  Total IN: 576 mL    OUT:    Drain (mL): 75 mL    Voided (mL): 220 mL  Total OUT: 295 mL    Total NET: 281 mL    Vital Signs Last 24 Hrs  T(C): 36.7 (24 Dec 2023 09:00), Max: 36.7 (24 Dec 2023 09:00)  T(F): 98.1 (24 Dec 2023 09:00), Max: 98.1 (24 Dec 2023 09:00)  HR: 82 (24 Dec 2023 10:00) (57 - 84)  BP: 159/69 (24 Dec 2023 09:00) (123/58 - 174/74)  BP(mean): 99 (24 Dec 2023 09:00) (82 - 121)  RR: 22 (24 Dec 2023 10:00) (19 - 30)  SpO2: 97% (24 Dec 2023 10:00) (93% - 100%)    Parameters below as of 24 Dec 2023 10:00  Patient On (Oxygen Delivery Method): room air    Gen: NAD  ENT: mucus membrane moist  Cardiovascular: RRR  Respiratory: CTA  Gastrointestinal: soft, NT, ND, BS+, fistula thickish yellowish formed output, wound manager in place. perc cony bilious.  Extremities: warm, no dependent edema  Neurological: AAOx3, CNII-XII intact,  strength 5/5 b/l    LABS:                        7.3    7.51  )-----------( 249      ( 24 Dec 2023 05:51 )             23.4     12-24    135  |  101  |  44<H>  ----------------------------<  137<H>  4.3   |  27  |  1.56<H>    Ca    8.5      24 Dec 2023 05:51  Phos  3.3     12-24  Mg     2.1     12-24    TPro  8.5<H>  /  Alb  2.3<L>  /  TBili  6.5<H>  /  DBili  4.2<H>  /  AST  123<H>  /  ALT  78<H>  /  AlkPhos  131<H>  12-24    Urinalysis Basic - ( 24 Dec 2023 05:51 )    Color: x / Appearance: x / SG: x / pH: x  Gluc: 137 mg/dL / Ketone: x  / Bili: x / Urobili: x   Blood: x / Protein: x / Nitrite: x   Leuk Esterase: x / RBC: x / WBC x   Sq Epi: x / Non Sq Epi: x / Bacteria: x

## 2023-12-24 NOTE — PROGRESS NOTE ADULT - ASSESSMENT
78M w PMH Crohn's, AFib/Flutter s/p DCCVs on amiodarone, remote ileocectomy and open appendectomy. Admitted (6/23) for SBO vs Crohns flare, s/p NGT decompression and s/p lap converted to open TRE, SBR x 3, left in discontinuity with abthera vac on (6/27), RTOR for ileocolic resection, small bowel anastomosis, and abdominal wall closure on (6/28), c/b fluid collection s/p IR aspiration of perihepatic fluid on (7/3), c/b wound dehiscence s/p RTOR exlap, washout, ileocolic resection with end ileostomy, blow hole colostomy, red rubber from ileostomy to small bowel anastomosis; vicryl bridging mesh on (7/5) transferred to SICU postoperatively for hemodynamic monitoring, with hospital course complicated by periods of severe ELVI and hyponatremia, which resolved but stepped back up for to SICU on (9/10) for acute AMS, intermittent hypoglycemia, AFib with RVR. Percutaneous Cholecystostomy placed on (9/11) for enlarged GB, PCT capped 10/5 and uncapped 10/25 for hyperbilirubinemia, Right brachial DVT, left basillic and cephalic superficial thrombus on duplex 11/2, CT 11/14 with ALDEN ground glass opacity of unclear etiology, completed empiric 7day cefepime course 11/22, rising T-bili of unclear etilogy, now w resolved candida glabrata fungemia, ELVI improving.     NEURO: Hx of depression: cont Effexor. Nausea: Zofran PRN. Anxiety: Lorazepam PRN.  CV: Hx Afib/flutter: s/p DCCV, Amio stopped due to persistent transaminitis, continue labetalol IV 10q6h. Hx HLD: Lipitor held given high LFTs. TTE  (7/18) - PASP 64mmHg, EF 65-70%. Hx HTN: not absorbing PO meds, holding Losartan,  hydralazine PRN.   PULM: Atelectasis/dyspnea improved clinically: lower to 1 hr of BiPAP every 12hrs of nasal canula or room air. Encourage OOB and IS.  CT from 11/14 with ALDEN ground glass opacity of unclear etiology. COVID negative. RVP negative. completed 7d empiric cefepime 11/22 to cover for potential PNA.   GI/FEN: Low res, low fat, high protein diet. Cont Ensure max x1/day. Folate, thiamine. PICC replaced 12/4, continue TPN/lipids. High output EC fistula: cont Octreotide & Cholestyramine 10/18. Transaminitis elevated T bili of unclear origin, s/p Perc Deb on 9/11; MRCP 10/30 no obstruction, seen by Hepatology, started on ursodiol, RUQ US 11/25 CBD 5mm, hepatic vessels patent Repeat. MRCP neg. Monitor drainage from fistula, bolus as needed to keep I&O even.    : Voids. ELVI: Cr stable, stopped famotidine and half Effexor dose given renal dysfunction. MARLO Duplex and RP US unremarkable, CK wnl.  f/u renal recs.  ENDO: Hx hypothyroid: IV Synthroid, TSH low on 11/30, decreased synthroid dose, repeat TSH 12/6 wnl.  ID: BCx 11/22 grew candida glabrata, likely CLABSI. ID consulted, s/p Caspo, switched to Fluconazole (12/1-12/9). Repeat surveillance blood cx NGTD. Ophthalmology evaulated - normal ocular exam. Echo no vegetation. PICC replaced on 12/4. Cont Nystatin powder. // DC: caspofungin (11/24-12/1). Completed empiric 7days cefepime (11/15-11/22), had C. tertium, Lactobacillis from his IR cx 7/3, and candida albicans, lactobacillus, vanc sensitive E faecium, vanc resistant E gallinarum, vanc resistant E casseliflavus, lactobacillus from his OR cx 7/5. Completed course of abx with Imipenem (9/10-9/15). Imipenem (8/26--) imipenem (6/30-7/12, 7/23-7/30), Dapto (6/30-7/5 and 7/23-7/24). Empiric dapto (8/23-24) and cefepime (8/23-24).   PPX: SCDs, SQH, was on Therapeutic lovenox bid for R brachial vein DVT, but will hold off on hep gtt since repeat US Duplex negative.  HEME: Anemia - continue Epogen weekly. S/p Iron Sucrose 200 qd x 3 days for chronic anemia. Hg 7.3 this AM, gave 1U RBC.  LINES: PIVs, ZAHEER PICC (12/4 - ) will plan to switch PICC once a month // DC: MERONE PICC (9/1-11/1 ), FATOUE PICC: (11/1-11/24)   WOUNDS/DRAINS: Abdominal wound and fistula with wound manager in place dressing change Tuesday and Friday. RLQ Ostomy. IR perc deb tube. // DC: L Clay drain.  PT: Ambulate as tolerated OOB to chair daily.  DISPO: SICU due to complicated hospital course.     78M w PMH Crohn's, AFib/Flutter s/p DCCVs on amiodarone, remote ileocectomy and open appendectomy. Admitted (6/23) for SBO vs Crohns flare, s/p NGT decompression and s/p lap converted to open TRE, SBR x 3, left in discontinuity with abthera vac on (6/27), RTOR for ileocolic resection, small bowel anastomosis, and abdominal wall closure on (6/28), c/b fluid collection s/p IR aspiration of perihepatic fluid on (7/3), c/b wound dehiscence s/p RTOR exlap, washout, ileocolic resection with end ileostomy, blow hole colostomy, red rubber from ileostomy to small bowel anastomosis; vicryl bridging mesh on (7/5) transferred to SICU postoperatively for hemodynamic monitoring, with hospital course complicated by periods of severe ELVI and hyponatremia, which resolved but stepped back up for to SICU on (9/10) for acute AMS, intermittent hypoglycemia, AFib with RVR. Percutaneous Cholecystostomy placed on (9/11) for enlarged GB, PCT capped 10/5 and uncapped 10/25 for hyperbilirubinemia, Right brachial DVT, left basillic and cephalic superficial thrombus on duplex 11/2, CT 11/14 with ALDEN ground glass opacity of unclear etiology, completed empiric 7day cefepime course 11/22, rising T-bili of unclear etilogy, now w resolved candida glabrata fungemia, ELVI improving.     NEURO: Hx of depression: cont Effexor. Nausea: Zofran PRN. Anxiety: Lorazepam PRN.  CV: Hx Afib/flutter: s/p DCCV, Amio stopped due to persistent transaminitis, continue labetalol IV 10q6h. Hx HLD: Lipitor held given high LFTs. TTE  (7/18) - PASP 64mmHg, EF 65-70%. Hx HTN: not absorbing PO meds, holding Losartan,  hydralazine PRN.   PULM: Atelectasis/dyspnea improved clinically: lower to 1 hr of BiPAP every 12hrs of nasal canula or room air. Encourage OOB and IS.  CT from 11/14 with LADEN ground glass opacity of unclear etiology. COVID negative. RVP negative. completed 7d empiric cefepime 11/22 to cover for potential PNA.   GI/FEN: Low res, low fat, high protein diet. Cont Ensure max x1/day. Folate, thiamine. PICC replaced 12/4, continue TPN/lipids. High output EC fistula: cont Octreotide & Cholestyramine 10/18. Transaminitis elevated T bili of unclear origin, s/p Perc Deb on 9/11; MRCP 10/30 no obstruction, seen by Hepatology, started on ursodiol, RUQ US 11/25 CBD 5mm, hepatic vessels patent Repeat. MRCP neg. Monitor drainage from fistula, bolus as needed to keep I&O even.    : Voids. ELVI: Cr stable, stopped famotidine and half Effexor dose given renal dysfunction. MARLO Duplex and RP US unremarkable, CK wnl.  f/u renal recs.  ENDO: Hx hypothyroid: IV Synthroid, TSH low on 11/30, decreased synthroid dose, repeat TSH 12/6 wnl.  ID: BCx 11/22 grew candida glabrata, likely CLABSI. ID consulted, s/p Caspo, switched to Fluconazole (12/1-12/9). Repeat surveillance blood cx NGTD. Ophthalmology evaulated - normal ocular exam. Echo no vegetation. PICC replaced on 12/4. Cont Nystatin powder. // DC: caspofungin (11/24-12/1). Completed empiric 7days cefepime (11/15-11/22), had C. tertium, Lactobacillis from his IR cx 7/3, and candida albicans, lactobacillus, vanc sensitive E faecium, vanc resistant E gallinarum, vanc resistant E casseliflavus, lactobacillus from his OR cx 7/5. Completed course of abx with Imipenem (9/10-9/15). Imipenem (8/26--) imipenem (6/30-7/12, 7/23-7/30), Dapto (6/30-7/5 and 7/23-7/24). Empiric dapto (8/23-24) and cefepime (8/23-24).   PPX: SCDs, SQH, was on Therapeutic lovenox bid for R brachial vein DVT, but will hold off on hep gtt since repeat US Duplex negative.  HEME: Anemia - continue Epogen weekly. S/p Iron Sucrose 200 qd x 3 days for chronic anemia. Hg 7.3 this AM, gave 1U RBC.  LINES: PIVs, ZAHEER PICC (12/4 - ) will plan to switch PICC once a month // DC: MERONE PICC (9/1-11/1 ), FATOUE PICC: (11/1-11/24)   WOUNDS/DRAINS: Abdominal wound and fistula with wound manager in place dressing change Tuesday and Friday. RLQ Ostomy. IR perc deb tube. // DC: L Clay drain.  PT: Ambulate as tolerated OOB to chair daily.  DISPO: SICU due to complicated hospital course.

## 2023-12-24 NOTE — PROGRESS NOTE ADULT - ASSESSMENT
78M w PMH Crohn's, AFib/Flutter s/p DCCVs on amiodarone, remote ileocectomy and open appendectomy. Admitted (6/23) for SBO vs Crohns flare, s/p NGT decompression and s/p lap converted to open TRE, SBR x 3, left in discontinuity with abthera vac on (6/27), RTOR for ileocolic resection, small bowel anastomosis, and abdominal wall closure on (6/28), c/b fluid collection s/p IR aspiration of perihepatic fluid on (7/3), c/b wound dehiscence s/p RTOR exlap, washout, ileocolic resection with end ileostomy, blow hole colostomy, red rubber from ileostomy to small bowel anastomosis; vicryl bridging mesh on (7/5) transferred to SICU postoperatively for hemodynamic monitoring, with hospital course complicated by periods of severe ELVI and hyponatremia, which resolved but stepped back up for to SICU on (9/10) for acute AMS, intermittent hypoglycemia, AFib with RVR. Percutaneous Cholecystostomy placed on (9/11) for enlarged GB, PCT capped 10/5 and uncapped 10/25 for hyperbilirubinemia, Right brachial DVT, left basillic and cephalic superficial thrombus on duplex 11/2, CT 11/14 with ALDEN ground glass opacity of unclear etiology, completed empiric 7day cefepime course 11/22, rising T-bili of unclear etilogy, now w resolved candida glabrata fungemia, ELVI resolving.    transfuse 1uPRBC  Trend Cr, f/u Nephrology w/ addl recs   cw diet as tolerated   f/u hepatology recommendations   Wound care as per ostomy nurses   Continue to watch hydration status and repleting/restricting  continue with PT   rest of care per SICU, appreciate excellent care by SICU team

## 2023-12-24 NOTE — PROGRESS NOTE ADULT - ATTENDING COMMENTS
Spoke with Dr. Trejo and reassured him that his Cr has remained stable at 1.5.  He appeared in good spirits.  Case d/w renal fellow, Dr. Garland.  Agree with recommendations above.  Please feel free to contact us with any renal questions or concerns.

## 2023-12-24 NOTE — PROGRESS NOTE ADULT - SUBJECTIVE AND OBJECTIVE BOX
Patient is a 78y Male seen and evaluated at bedside.       Meds:    chlorhexidine 2% Cloths 1 <User Schedule>  cholestyramine Powder (Sugar-Free) 4 daily  epoetin matt-epbx (RETACRIT) Injectable 52084 every 7 days  ergocalciferol 15518 <User Schedule>  glucagon  Injectable 1 once  heparin   Injectable 5000 every 8 hours  hydrALAZINE Injectable 10 every 6 hours PRN  labetalol Injectable 10 every 6 hours  levothyroxine Injectable 30 at bedtime  lidocaine 2% Viscous 10 every 12 hours PRN  lipid, fat emulsion (Fish Oil and Plant Based) 20% Infusion 0.5359 <Continuous>  lipid, fat emulsion (Fish Oil and Plant Based) 20% Infusion 0.5359 <Continuous>  LORazepam   Injectable 1 <User Schedule> PRN  ondansetron Injectable 4 every 6 hours PRN  Parenteral Nutrition - Adult 1 <Continuous>  Parenteral Nutrition - Adult 1 <Continuous>  ursodiol Suspension 300 every 8 hours  venlafaxine XR. 150 daily      T(C): , Max: 36.6 (12-23-23 @ 21:39)  T(F): , Max: 97.9 (12-24-23 @ 00:31)  HR: 81 (12-24-23 @ 08:00)  BP: 157/70 (12-24-23 @ 08:00)  BP(mean): 100 (12-24-23 @ 08:00)  RR: 28 (12-24-23 @ 08:00)  SpO2: 95% (12-24-23 @ 08:00)  Wt(kg): --    12-23 @ 07:01  -  12-24 @ 07:00  --------------------------------------------------------  IN: 3906.4 mL / OUT: 3710 mL / NET: 196.4 mL    12-24 @ 07:01  -  12-24 @ 09:10  --------------------------------------------------------  IN: 138 mL / OUT: 0 mL / NET: 138 mL          Review of Systems:  ROS negative except as per HPI      PHYSICAL EXAM:  Gen: NAD  ENT: mucus membrane moist  Cardiovascular: RRR  Respiratory: CTA  Gastrointestinal: abdominal wound noted with conrado pouch, PCT noted  Extremities: warm, no dependent edema  Neurological: AAOx3      LABS:                        7.3    7.51  )-----------( 249      ( 24 Dec 2023 05:51 )             23.4     12-24    135  |  101  |  44<H>  ----------------------------<  137<H>  4.3   |  27  |  1.56<H>    Ca    8.5      24 Dec 2023 05:51  Phos  3.3     12-24  Mg     2.1     12-24    TPro  8.5<H>  /  Alb  2.3<L>  /  TBili  6.5<H>  /  DBili  4.2<H>  /  AST  123<H>  /  ALT  78<H>  /  AlkPhos  131<H>  12-24        Urinalysis Basic - ( 24 Dec 2023 05:51 )    Color: x / Appearance: x / SG: x / pH: x  Gluc: 137 mg/dL / Ketone: x  / Bili: x / Urobili: x   Blood: x / Protein: x / Nitrite: x   Leuk Esterase: x / RBC: x / WBC x   Sq Epi: x / Non Sq Epi: x / Bacteria: x            RADIOLOGY & ADDITIONAL STUDIES:           Patient is a 78y Male seen and evaluated at bedside.       Meds:    chlorhexidine 2% Cloths 1 <User Schedule>  cholestyramine Powder (Sugar-Free) 4 daily  epoetin matt-epbx (RETACRIT) Injectable 18911 every 7 days  ergocalciferol 78092 <User Schedule>  glucagon  Injectable 1 once  heparin   Injectable 5000 every 8 hours  hydrALAZINE Injectable 10 every 6 hours PRN  labetalol Injectable 10 every 6 hours  levothyroxine Injectable 30 at bedtime  lidocaine 2% Viscous 10 every 12 hours PRN  lipid, fat emulsion (Fish Oil and Plant Based) 20% Infusion 0.5359 <Continuous>  lipid, fat emulsion (Fish Oil and Plant Based) 20% Infusion 0.5359 <Continuous>  LORazepam   Injectable 1 <User Schedule> PRN  ondansetron Injectable 4 every 6 hours PRN  Parenteral Nutrition - Adult 1 <Continuous>  Parenteral Nutrition - Adult 1 <Continuous>  ursodiol Suspension 300 every 8 hours  venlafaxine XR. 150 daily      T(C): , Max: 36.6 (12-23-23 @ 21:39)  T(F): , Max: 97.9 (12-24-23 @ 00:31)  HR: 81 (12-24-23 @ 08:00)  BP: 157/70 (12-24-23 @ 08:00)  BP(mean): 100 (12-24-23 @ 08:00)  RR: 28 (12-24-23 @ 08:00)  SpO2: 95% (12-24-23 @ 08:00)  Wt(kg): --    12-23 @ 07:01  -  12-24 @ 07:00  --------------------------------------------------------  IN: 3906.4 mL / OUT: 3710 mL / NET: 196.4 mL    12-24 @ 07:01  -  12-24 @ 09:10  --------------------------------------------------------  IN: 138 mL / OUT: 0 mL / NET: 138 mL          Review of Systems:  ROS negative except as per HPI      PHYSICAL EXAM:  Gen: NAD  ENT: mucus membrane moist  Cardiovascular: RRR  Respiratory: CTA  Gastrointestinal: abdominal wound noted with conrado pouch, PCT noted  Extremities: warm, no dependent edema  Neurological: AAOx3      LABS:                        7.3    7.51  )-----------( 249      ( 24 Dec 2023 05:51 )             23.4     12-24    135  |  101  |  44<H>  ----------------------------<  137<H>  4.3   |  27  |  1.56<H>    Ca    8.5      24 Dec 2023 05:51  Phos  3.3     12-24  Mg     2.1     12-24    TPro  8.5<H>  /  Alb  2.3<L>  /  TBili  6.5<H>  /  DBili  4.2<H>  /  AST  123<H>  /  ALT  78<H>  /  AlkPhos  131<H>  12-24        Urinalysis Basic - ( 24 Dec 2023 05:51 )    Color: x / Appearance: x / SG: x / pH: x  Gluc: 137 mg/dL / Ketone: x  / Bili: x / Urobili: x   Blood: x / Protein: x / Nitrite: x   Leuk Esterase: x / RBC: x / WBC x   Sq Epi: x / Non Sq Epi: x / Bacteria: x            RADIOLOGY & ADDITIONAL STUDIES:

## 2023-12-25 NOTE — PROGRESS NOTE ADULT - SUBJECTIVE AND OBJECTIVE BOX
SUBJECTIVE: Denies any complaints. Received PRBC yesterday.      MEDICATIONS  (STANDING):  chlorhexidine 2% Cloths 1 Application(s) Topical <User Schedule>  cholestyramine Powder (Sugar-Free) 4 Gram(s) Oral daily  epoetin matt-epbx (RETACRIT) Injectable 48043 Unit(s) SubCutaneous every 7 days  ergocalciferol 96279 Unit(s) Oral <User Schedule>  glucagon  Injectable 1 milliGRAM(s) IntraMuscular once  heparin   Injectable 5000 Unit(s) SubCutaneous every 8 hours  labetalol Injectable 10 milliGRAM(s) IV Push every 6 hours  levothyroxine Injectable 30 MICROGram(s) IV Push at bedtime  lipid, fat emulsion (Fish Oil and Plant Based) 20% Infusion 0.5359 Gm/kG/Day (20.83 mL/Hr) IV Continuous <Continuous>  lipid, fat emulsion (Fish Oil and Plant Based) 20% Infusion 0.5359 Gm/kG/Day (20.83 mL/Hr) IV Continuous <Continuous>  Parenteral Nutrition - Adult 1 Each TPN Continuous <Continuous>  Parenteral Nutrition - Adult 1 Each TPN Continuous <Continuous>  ursodiol Suspension 300 milliGRAM(s) Oral every 8 hours  venlafaxine XR. 150 milliGRAM(s) Oral daily    MEDICATIONS  (PRN):  hydrALAZINE Injectable 10 milliGRAM(s) IV Push every 6 hours PRN SBP >170  lidocaine 2% Viscous 10 milliLiter(s) Swish and Spit every 12 hours PRN tongue pain  LORazepam   Injectable 1 milliGRAM(s) IV Push <User Schedule> PRN Anxiety  ondansetron Injectable 4 milliGRAM(s) IV Push every 6 hours PRN Nausea and/or Vomiting      Vital Signs Last 24 Hrs  T(C): 36.6 (25 Dec 2023 08:23), Max: 36.8 (24 Dec 2023 22:01)  T(F): 97.9 (25 Dec 2023 08:23), Max: 98.2 (24 Dec 2023 22:01)  HR: 81 (25 Dec 2023 08:00) (78 - 84)  BP: 164/72 (25 Dec 2023 08:00) (135/62 - 191/79)  BP(mean): 103 (25 Dec 2023 08:00) (89 - 129)  RR: 24 (25 Dec 2023 08:00) (19 - 45)  SpO2: 97% (25 Dec 2023 08:00) (94% - 100%)    Parameters below as of 25 Dec 2023 09:00  Patient On (Oxygen Delivery Method): room air        PHYSICAL EXAM:    Gen: NAD  ENT: mucus membrane moist  Cardiovascular: RRR  Respiratory: CTA  Gastrointestinal: soft, NT, ND, BS+, fistula thickish yellowish formed output, wound manager in place. perc cony bilious.  Extremities: warm, no dependent edema  Neurological: AAOx3                    I&O's Detail    24 Dec 2023 07:01  -  25 Dec 2023 07:00  --------------------------------------------------------  IN:    Fat Emulsion (Fish Oil &amp; Plant Based) 20% Infusion: 249.6 mL    Oral Fluid: 400 mL    PRBCs (Packed Red Blood Cells): 300 mL    TPN (Total Parenteral Nutrition): 2331 mL  Total IN: 3280.6 mL    OUT:    Drain (mL): 25 mL    Drain (mL): 1000 mL    Drain (mL): 925 mL    Voided (mL): 2145 mL  Total OUT: 4095 mL    Total NET: -814.4 mL      25 Dec 2023 07:01  -  25 Dec 2023 09:45  --------------------------------------------------------  IN:  Total IN: 0 mL    OUT:    TPN (Total Parenteral Nutrition): 0 mL    Voided (mL): 200 mL  Total OUT: 200 mL    Total NET: -200 mL          LABS:                        7.3    7.51  )-----------( 249      ( 24 Dec 2023 05:51 )             23.4     12-24    135  |  101  |  44<H>  ----------------------------<  137<H>  4.3   |  27  |  1.56<H>    Ca    8.5      24 Dec 2023 05:51  Phos  3.3     12-24  Mg     2.1     12-24    TPro  8.5<H>  /  Alb  2.3<L>  /  TBili  6.5<H>  /  DBili  4.2<H>  /  AST  123<H>  /  ALT  78<H>  /  AlkPhos  131<H>  12-24      Urinalysis Basic - ( 24 Dec 2023 05:51 )    Color: x / Appearance: x / SG: x / pH: x  Gluc: 137 mg/dL / Ketone: x  / Bili: x / Urobili: x   Blood: x / Protein: x / Nitrite: x   Leuk Esterase: x / RBC: x / WBC x   Sq Epi: x / Non Sq Epi: x / Bacteria: x        RADIOLOGY & ADDITIONAL STUDIES: SUBJECTIVE: Denies any complaints. Received PRBC yesterday.      MEDICATIONS  (STANDING):  chlorhexidine 2% Cloths 1 Application(s) Topical <User Schedule>  cholestyramine Powder (Sugar-Free) 4 Gram(s) Oral daily  epoetin matt-epbx (RETACRIT) Injectable 52223 Unit(s) SubCutaneous every 7 days  ergocalciferol 62777 Unit(s) Oral <User Schedule>  glucagon  Injectable 1 milliGRAM(s) IntraMuscular once  heparin   Injectable 5000 Unit(s) SubCutaneous every 8 hours  labetalol Injectable 10 milliGRAM(s) IV Push every 6 hours  levothyroxine Injectable 30 MICROGram(s) IV Push at bedtime  lipid, fat emulsion (Fish Oil and Plant Based) 20% Infusion 0.5359 Gm/kG/Day (20.83 mL/Hr) IV Continuous <Continuous>  lipid, fat emulsion (Fish Oil and Plant Based) 20% Infusion 0.5359 Gm/kG/Day (20.83 mL/Hr) IV Continuous <Continuous>  Parenteral Nutrition - Adult 1 Each TPN Continuous <Continuous>  Parenteral Nutrition - Adult 1 Each TPN Continuous <Continuous>  ursodiol Suspension 300 milliGRAM(s) Oral every 8 hours  venlafaxine XR. 150 milliGRAM(s) Oral daily    MEDICATIONS  (PRN):  hydrALAZINE Injectable 10 milliGRAM(s) IV Push every 6 hours PRN SBP >170  lidocaine 2% Viscous 10 milliLiter(s) Swish and Spit every 12 hours PRN tongue pain  LORazepam   Injectable 1 milliGRAM(s) IV Push <User Schedule> PRN Anxiety  ondansetron Injectable 4 milliGRAM(s) IV Push every 6 hours PRN Nausea and/or Vomiting      Vital Signs Last 24 Hrs  T(C): 36.6 (25 Dec 2023 08:23), Max: 36.8 (24 Dec 2023 22:01)  T(F): 97.9 (25 Dec 2023 08:23), Max: 98.2 (24 Dec 2023 22:01)  HR: 81 (25 Dec 2023 08:00) (78 - 84)  BP: 164/72 (25 Dec 2023 08:00) (135/62 - 191/79)  BP(mean): 103 (25 Dec 2023 08:00) (89 - 129)  RR: 24 (25 Dec 2023 08:00) (19 - 45)  SpO2: 97% (25 Dec 2023 08:00) (94% - 100%)    Parameters below as of 25 Dec 2023 09:00  Patient On (Oxygen Delivery Method): room air        PHYSICAL EXAM:    Gen: NAD  ENT: mucus membrane moist  Cardiovascular: RRR  Respiratory: CTA  Gastrointestinal: soft, NT, ND, BS+, fistula thickish yellowish formed output, wound manager in place. perc cony bilious.  Extremities: warm, no dependent edema  Neurological: AAOx3                    I&O's Detail    24 Dec 2023 07:01  -  25 Dec 2023 07:00  --------------------------------------------------------  IN:    Fat Emulsion (Fish Oil &amp; Plant Based) 20% Infusion: 249.6 mL    Oral Fluid: 400 mL    PRBCs (Packed Red Blood Cells): 300 mL    TPN (Total Parenteral Nutrition): 2331 mL  Total IN: 3280.6 mL    OUT:    Drain (mL): 25 mL    Drain (mL): 1000 mL    Drain (mL): 925 mL    Voided (mL): 2145 mL  Total OUT: 4095 mL    Total NET: -814.4 mL      25 Dec 2023 07:01  -  25 Dec 2023 09:45  --------------------------------------------------------  IN:  Total IN: 0 mL    OUT:    TPN (Total Parenteral Nutrition): 0 mL    Voided (mL): 200 mL  Total OUT: 200 mL    Total NET: -200 mL          LABS:                        7.3    7.51  )-----------( 249      ( 24 Dec 2023 05:51 )             23.4     12-24    135  |  101  |  44<H>  ----------------------------<  137<H>  4.3   |  27  |  1.56<H>    Ca    8.5      24 Dec 2023 05:51  Phos  3.3     12-24  Mg     2.1     12-24    TPro  8.5<H>  /  Alb  2.3<L>  /  TBili  6.5<H>  /  DBili  4.2<H>  /  AST  123<H>  /  ALT  78<H>  /  AlkPhos  131<H>  12-24      Urinalysis Basic - ( 24 Dec 2023 05:51 )    Color: x / Appearance: x / SG: x / pH: x  Gluc: 137 mg/dL / Ketone: x  / Bili: x / Urobili: x   Blood: x / Protein: x / Nitrite: x   Leuk Esterase: x / RBC: x / WBC x   Sq Epi: x / Non Sq Epi: x / Bacteria: x        RADIOLOGY & ADDITIONAL STUDIES:

## 2023-12-25 NOTE — PROGRESS NOTE ADULT - ASSESSMENT
78M w PMH Crohn's, AFib/Flutter s/p DCCVs on amiodarone, remote ileocectomy and open appendectomy. Admitted (6/23) for SBO vs Crohns flare, s/p NGT decompression and s/p lap converted to open TRE, SBR x 3, left in discontinuity with abthera vac on (6/27), RTOR for ileocolic resection, small bowel anastomosis, and abdominal wall closure on (6/28), c/b fluid collection s/p IR aspiration of perihepatic fluid on (7/3), c/b wound dehiscence s/p RTOR exlap, washout, ileocolic resection with end ileostomy, blow hole colostomy, red rubber from ileostomy to small bowel anastomosis; vicryl bridging mesh on (7/5) transferred to SICU postoperatively for hemodynamic monitoring, with hospital course complicated by periods of severe ELVI and hyponatremia, which resolved but stepped back up for to SICU on (9/10) for acute AMS, intermittent hypoglycemia, AFib with RVR. Percutaneous Cholecystostomy placed on (9/11) for enlarged GB, PCT capped 10/5 and uncapped 10/25 for hyperbilirubinemia, Right brachial DVT, left basillic and cephalic superficial thrombus on duplex 11/2, CT 11/14 with ALDEN ground glass opacity of unclear etiology, completed empiric 7day cefepime course 11/22, rising T-bili of unclear etilogy, now w resolved candida glabrata fungemia, ELVI improving.     NEURO: Hx of depression: cont Effexor. Nausea: Zofran PRN. Anxiety: Lorazepam PRN.  CV: Hx Afib/flutter: s/p DCCV, Amio stopped due to persistent transaminitis, continue labetalol IV 10q6h. Hx HLD: Lipitor held given high LFTs. TTE  (7/18) - PASP 64mmHg, EF 65-70%. Hx HTN: not absorbing PO meds, holding Losartan,  hydralazine PRN.   PULM: Atelectasis/dyspnea improved clinically: lower to 1 hr of BiPAP every 12hrs of nasal canula or room air. Encourage OOB and IS.  CT from 11/14 with ALDEN ground glass opacity of unclear etiology. COVID negative. RVP negative. completed 7d empiric cefepime 11/22 to cover for potential PNA.   GI/FEN: Low res, low fat, high protein diet. Cont Ensure max x1/day. Folate, thiamine. PICC replaced 12/4, continue TPN/lipids. High output EC fistula: cont Octreotide & Cholestyramine 10/18. Transaminitis elevated T bili of unclear origin, s/p Perc Deb on 9/11; MRCP 10/30 no obstruction, seen by Hepatology, started on ursodiol, RUQ US 11/25 CBD 5mm, hepatic vessels patent Repeat. MRCP neg.   : Voids. ELVI: Cr stable, stopped famotidine and half Effexor dose given renal dysfunction. MARLO Duplex and RP US unremarkable, CK wnl.  f/u renal recs.  ENDO: Hx hypothyroid: IV Synthroid, TSH low on 11/30, decreased synthroid dose, repeat TSH 12/6 wnl.  ID: BCx 11/22 grew candida glabrata, likely CLABSI. ID consulted, s/p Caspo, switched to Fluconazole (12/1-12/9). Repeat surveillance blood cx NGTD. Ophthalmology evaulated - normal ocular exam. Echo no vegetation. PICC replaced on 12/4. Cont Nystatin powder. // DC: caspofungin (11/24-12/1). Completed empiric 7days cefepime (11/15-11/22), had C. tertium, Lactobacillis from his IR cx 7/3, and candida albicans, lactobacillus, vanc sensitive E faecium, vanc resistant E gallinarum, vanc resistant E casseliflavus, lactobacillus from his OR cx 7/5. Completed course of abx with Imipenem (9/10-9/15). Imipenem (8/26--) imipenem (6/30-7/12, 7/23-7/30), Dapto (6/30-7/5 and 7/23-7/24). Empiric dapto (8/23-24) and cefepime (8/23-24).   PPX: SCDs, SQH, was on Therapeutic lovenox bid for R brachial vein DVT, but will hold off on hep gtt since repeat US Duplex negative.  HEME: Anemia - continue Epogen weekly. S/p Iron Sucrose 200 qd x 3 days for chronic anemia. Hg 7.3 this AM, gave 1U RBC.  LINES: PIVs, ZAHEER PICC (12/4 - ) will plan to switch PICC once a month // DC: MERONE PICC (9/1-11/1 ), HOLLIS PICC: (11/1-11/24)   WOUNDS/DRAINS: Abdominal wound and fistula with wound manager in place dressing change Tuesday and Friday. RLQ Ostomy. IR perc deb tube. // DC: L Clay drain.  PT: Ambulate as tolerated OOB to chair daily.  DISPO: SICU due to complicated hospital course.

## 2023-12-25 NOTE — PROGRESS NOTE ADULT - SUBJECTIVE AND OBJECTIVE BOX
Coverage for Dr. Peterson  Awake and alert.   AFEb - VSS  No complaints.    Output stable.    Received 1 unit of packed red blood cells yesterday.    Continue supportive care.

## 2023-12-25 NOTE — PROGRESS NOTE ADULT - SUBJECTIVE AND OBJECTIVE BOX
24 hr events:  ON: insomnia overnight   12/24: hg 7.3, 1U PRBC per Dr. Hernandes.     SUBJECTIVE:  NAEON. 1u pRBC yesterday for hgb drift. Dustin pouch without leaking or blood.    MEDICATIONS  (STANDING):  chlorhexidine 2% Cloths 1 Application(s) Topical <User Schedule>  cholestyramine Powder (Sugar-Free) 4 Gram(s) Oral daily  epoetin matt-epbx (RETACRIT) Injectable 71993 Unit(s) SubCutaneous every 7 days  ergocalciferol 43941 Unit(s) Oral <User Schedule>  glucagon  Injectable 1 milliGRAM(s) IntraMuscular once  heparin   Injectable 5000 Unit(s) SubCutaneous every 8 hours  labetalol Injectable 10 milliGRAM(s) IV Push every 6 hours  levothyroxine Injectable 30 MICROGram(s) IV Push at bedtime  lipid, fat emulsion (Fish Oil and Plant Based) 20% Infusion 0.5359 Gm/kG/Day (20.83 mL/Hr) IV Continuous <Continuous>  Parenteral Nutrition - Adult 1 Each TPN Continuous <Continuous>  Parenteral Nutrition - Adult 1 Each TPN Continuous <Continuous>  ursodiol Suspension 300 milliGRAM(s) Oral every 8 hours  venlafaxine XR. 150 milliGRAM(s) Oral daily    MEDICATIONS  (PRN):  hydrALAZINE Injectable 10 milliGRAM(s) IV Push every 6 hours PRN SBP >170  lidocaine 2% Viscous 10 milliLiter(s) Swish and Spit every 12 hours PRN tongue pain  LORazepam   Injectable 1 milliGRAM(s) IV Push <User Schedule> PRN Anxiety  ondansetron Injectable 4 milliGRAM(s) IV Push every 6 hours PRN Nausea and/or Vomiting      ICU Vital Signs Last 24 Hrs  T(C): 36.3 (25 Dec 2023 12:36), Max: 36.8 (24 Dec 2023 22:01)  T(F): 97.4 (25 Dec 2023 12:36), Max: 98.2 (24 Dec 2023 22:01)  HR: 74 (25 Dec 2023 13:00) (73 - 84)  BP: 145/66 (25 Dec 2023 13:00) (129/73 - 191/79)  BP(mean): 95 (25 Dec 2023 13:00) (89 - 129)  ABP: --  ABP(mean): --  RR: 29 (25 Dec 2023 12:00) (17 - 45)  SpO2: 99% (25 Dec 2023 13:00) (94% - 100%)    O2 Parameters below as of 25 Dec 2023 13:00  Patient On (Oxygen Delivery Method): room air      Physical Exam:  Gen: NAD  ENT: mucus membrane moist  Cardiovascular: RRR  Respiratory: CTA  Gastrointestinal: soft, NT, ND, BS+, fistula thickish yellowish formed output, wound manager in place. perc cony bilious.  Extremities: warm, no dependent edema  Neurological: AAOx3, CNII-XII intact,  strength 5/5 b/l    I&O's Summary    24 Dec 2023 07:01  -  25 Dec 2023 07:00  --------------------------------------------------------  IN: 3280.6 mL / OUT: 4095 mL / NET: -814.4 mL    25 Dec 2023 07:01  -  25 Dec 2023 13:43  --------------------------------------------------------  IN: 0 mL / OUT: 1150 mL / NET: -1150 mL        LABS:                        7.3    7.51  )-----------( 249      ( 24 Dec 2023 05:51 )             23.4     12-24    135  |  101  |  44<H>  ----------------------------<  137<H>  4.3   |  27  |  1.56<H>    Ca    8.5      24 Dec 2023 05:51  Phos  3.3     12-24  Mg     2.1     12-24    TPro  8.5<H>  /  Alb  2.3<L>  /  TBili  6.5<H>  /  DBili  4.2<H>  /  AST  123<H>  /  ALT  78<H>  /  AlkPhos  131<H>  12-24      Urinalysis Basic - ( 24 Dec 2023 05:51 )    Color: x / Appearance: x / SG: x / pH: x  Gluc: 137 mg/dL / Ketone: x  / Bili: x / Urobili: x   Blood: x / Protein: x / Nitrite: x   Leuk Esterase: x / RBC: x / WBC x   Sq Epi: x / Non Sq Epi: x / Bacteria: x   24 hr events:  ON: insomnia overnight   12/24: hg 7.3, 1U PRBC per Dr. Hernandes.     SUBJECTIVE:  NAEON. 1u pRBC yesterday for hgb drift. Dustin pouch without leaking or blood.    MEDICATIONS  (STANDING):  chlorhexidine 2% Cloths 1 Application(s) Topical <User Schedule>  cholestyramine Powder (Sugar-Free) 4 Gram(s) Oral daily  epoetin matt-epbx (RETACRIT) Injectable 83541 Unit(s) SubCutaneous every 7 days  ergocalciferol 62721 Unit(s) Oral <User Schedule>  glucagon  Injectable 1 milliGRAM(s) IntraMuscular once  heparin   Injectable 5000 Unit(s) SubCutaneous every 8 hours  labetalol Injectable 10 milliGRAM(s) IV Push every 6 hours  levothyroxine Injectable 30 MICROGram(s) IV Push at bedtime  lipid, fat emulsion (Fish Oil and Plant Based) 20% Infusion 0.5359 Gm/kG/Day (20.83 mL/Hr) IV Continuous <Continuous>  Parenteral Nutrition - Adult 1 Each TPN Continuous <Continuous>  Parenteral Nutrition - Adult 1 Each TPN Continuous <Continuous>  ursodiol Suspension 300 milliGRAM(s) Oral every 8 hours  venlafaxine XR. 150 milliGRAM(s) Oral daily    MEDICATIONS  (PRN):  hydrALAZINE Injectable 10 milliGRAM(s) IV Push every 6 hours PRN SBP >170  lidocaine 2% Viscous 10 milliLiter(s) Swish and Spit every 12 hours PRN tongue pain  LORazepam   Injectable 1 milliGRAM(s) IV Push <User Schedule> PRN Anxiety  ondansetron Injectable 4 milliGRAM(s) IV Push every 6 hours PRN Nausea and/or Vomiting      ICU Vital Signs Last 24 Hrs  T(C): 36.3 (25 Dec 2023 12:36), Max: 36.8 (24 Dec 2023 22:01)  T(F): 97.4 (25 Dec 2023 12:36), Max: 98.2 (24 Dec 2023 22:01)  HR: 74 (25 Dec 2023 13:00) (73 - 84)  BP: 145/66 (25 Dec 2023 13:00) (129/73 - 191/79)  BP(mean): 95 (25 Dec 2023 13:00) (89 - 129)  ABP: --  ABP(mean): --  RR: 29 (25 Dec 2023 12:00) (17 - 45)  SpO2: 99% (25 Dec 2023 13:00) (94% - 100%)    O2 Parameters below as of 25 Dec 2023 13:00  Patient On (Oxygen Delivery Method): room air      Physical Exam:  Gen: NAD  ENT: mucus membrane moist  Cardiovascular: RRR  Respiratory: CTA  Gastrointestinal: soft, NT, ND, BS+, fistula thickish yellowish formed output, wound manager in place. perc cony bilious.  Extremities: warm, no dependent edema  Neurological: AAOx3, CNII-XII intact,  strength 5/5 b/l    I&O's Summary    24 Dec 2023 07:01  -  25 Dec 2023 07:00  --------------------------------------------------------  IN: 3280.6 mL / OUT: 4095 mL / NET: -814.4 mL    25 Dec 2023 07:01  -  25 Dec 2023 13:43  --------------------------------------------------------  IN: 0 mL / OUT: 1150 mL / NET: -1150 mL        LABS:                        7.3    7.51  )-----------( 249      ( 24 Dec 2023 05:51 )             23.4     12-24    135  |  101  |  44<H>  ----------------------------<  137<H>  4.3   |  27  |  1.56<H>    Ca    8.5      24 Dec 2023 05:51  Phos  3.3     12-24  Mg     2.1     12-24    TPro  8.5<H>  /  Alb  2.3<L>  /  TBili  6.5<H>  /  DBili  4.2<H>  /  AST  123<H>  /  ALT  78<H>  /  AlkPhos  131<H>  12-24      Urinalysis Basic - ( 24 Dec 2023 05:51 )    Color: x / Appearance: x / SG: x / pH: x  Gluc: 137 mg/dL / Ketone: x  / Bili: x / Urobili: x   Blood: x / Protein: x / Nitrite: x   Leuk Esterase: x / RBC: x / WBC x   Sq Epi: x / Non Sq Epi: x / Bacteria: x

## 2023-12-25 NOTE — PROGRESS NOTE ADULT - ASSESSMENT
78M w PMH Crohn's, AFib/Flutter s/p DCCVs on amiodarone, remote ileocectomy and open appendectomy. Admitted (6/23) for SBO vs Crohns flare, s/p NGT decompression and s/p lap converted to open TRE, SBR x 3, left in discontinuity with abthera vac on (6/27), RTOR for ileocolic resection, small bowel anastomosis, and abdominal wall closure on (6/28), c/b fluid collection s/p IR aspiration of perihepatic fluid on (7/3), c/b wound dehiscence s/p RTOR exlap, washout, ileocolic resection with end ileostomy, blow hole colostomy, red rubber from ileostomy to small bowel anastomosis; vicryl bridging mesh on (7/5) transferred to SICU postoperatively for hemodynamic monitoring, with hospital course complicated by periods of severe ELVI and hyponatremia, which resolved but stepped back up for to SICU on (9/10) for acute AMS, intermittent hypoglycemia, AFib with RVR. Percutaneous Cholecystostomy placed on (9/11) for enlarged GB, PCT capped 10/5 and uncapped 10/25 for hyperbilirubinemia, Right brachial DVT, left basillic and cephalic superficial thrombus on duplex 11/2, CT 11/14 with ALDEN ground glass opacity of unclear etiology, completed empiric 7day cefepime course 11/22, rising T-bili of unclear etilogy, now w resolved candida glabrata fungemia, ELVI resolving.      continue diet  Continue supportive care  Wound care as per ostomy nurses   Continue excellent care per SICU

## 2023-12-25 NOTE — PROGRESS NOTE ADULT - ATTENDING COMMENTS
UC,AF s/p SBR, ileocolic resection, ileostomy, enteroatmospheric fistula  physical as above  s/p transfusion PRBC  cotninue TPN   rest as above

## 2023-12-26 NOTE — PROGRESS NOTE ADULT - ASSESSMENT
78M w PMH Crohn's, AFib/Flutter s/p DCCVs on amiodarone, remote ileocectomy and open appendectomy. Admitted (6/23) for SBO vs Crohns flare, s/p NGT decompression and s/p lap converted to open TRE, SBR x 3, left in discontinuity with abthera vac on (6/27), RTOR for ileocolic resection, small bowel anastomosis, and abdominal wall closure on (6/28), c/b fluid collection s/p IR aspiration of perihepatic fluid on (7/3), c/b wound dehiscence s/p RTOR exlap, washout, ileocolic resection with end ileostomy, blow hole colostomy, red rubber from ileostomy to small bowel anastomosis; vicryl bridging mesh on (7/5) transferred to SICU postoperatively for hemodynamic monitoring, with hospital course complicated by periods of severe ELVI and hyponatremia, which resolved but stepped back up for to SICU on (9/10) for acute AMS, intermittent hypoglycemia, AFib with RVR. Percutaneous Cholecystostomy placed on (9/11) for enlarged GB, PCT capped 10/5 and uncapped 10/25 for hyperbilirubinemia, Right brachial DVT, left basillic and cephalic superficial thrombus on duplex 11/2, CT 11/14 with ALDEN ground glass opacity of unclear etiology, completed empiric 7day cefepime course 11/22, rising T-bili of unclear etilogy, now w resolved candida glabrata fungemia, ELVI improving.     NEURO: Hx of depression: cont Effexor. Nausea: Zofran PRN. Anxiety: Lorazepam PRN. Holistic consult for anxiety and insomnia.   CV: Hx Afib/flutter: s/p DCCV, Amio stopped due to persistent transaminitis, continue labetalol IV 10q6h. Hx HLD: Lipitor held given high LFTs. TTE  (7/18) - PASP 64mmHg, EF 65-70%. Hx HTN: not absorbing PO meds, holding Losartan,  hydralazine PRN.   PULM: Atelectasis/dyspnea improved clinically: lower to 1 hr of BiPAP every 12hrs of nasal canula or room air. Encourage OOB and IS.  CT from 11/14 with ALDEN ground glass opacity of unclear etiology. COVID negative. RVP negative. completed 7d empiric cefepime 11/22 to cover for potential PNA.   GI/FEN: Low res, low fat, high protein diet. Cont Ensure max x1/day. Folate, thiamine. PICC replaced 12/4, continue TPN/lipids. High output EC fistula: cont Octreotide & Cholestyramine 10/18. Transaminitis elevated T bili of unclear origin, s/p Perc Deb on 9/11; MRCP 10/30 no obstruction, seen by Hepatology, started on ursodiol, RUQ US 11/25 CBD 5mm, hepatic vessels patent Repeat. MRCP neg.   : Voids. ELVI: Cr stable, stopped famotidine and half Effexor dose given renal dysfunction. MARLO Duplex and RP US unremarkable, CK wnl.  f/u renal recs.  ENDO: Hx hypothyroid: IV Synthroid, TSH low on 11/30, decreased synthroid dose, repeat TSH 12/6 wnl.  ID: BCx 11/22 grew candida glabrata, likely CLABSI. ID consulted, s/p Caspo, switched to Fluconazole (12/1-12/9). Repeat surveillance blood cx NGTD. Ophthalmology evaulated - normal ocular exam. Echo no vegetation. PICC replaced on 12/4. Cont Nystatin powder. // DC: caspofungin (11/24-12/1). Completed empiric 7days cefepime (11/15-11/22), had C. tertium, Lactobacillis from his IR cx 7/3, and candida albicans, lactobacillus, vanc sensitive E faecium, vanc resistant E gallinarum, vanc resistant E casseliflavus, lactobacillus from his OR cx 7/5. Completed course of abx with Imipenem (9/10-9/15). Imipenem (8/26--) imipenem (6/30-7/12, 7/23-7/30), Dapto (6/30-7/5 and 7/23-7/24). Empiric dapto (8/23-24) and cefepime (8/23-24).   PPX: SCDs, SQH, was on Therapeutic lovenox bid for R brachial vein DVT, but will hold off on hep gtt since repeat US Duplex negative.  HEME: Anemia - continue Epogen weekly. S/p Iron Sucrose 200 qd x 3 days for chronic anemia. Hg 7.3 this AM, gave 1U RBC.  LINES: PIVs, ZAHEER PICC (12/4 - ) will plan to switch PICC once a month // DC: MERONE PICC (9/1-11/1 ), HOLLIS PICC: (11/1-11/24)   WOUNDS/DRAINS: Abdominal wound and fistula with wound manager in place dressing change Tuesday and Friday. RLQ Ostomy. IR perc deb tube. // DC: L Clay drain.  PT: Ambulate as tolerated OOB to chair daily.  DISPO: SICU due to complicated hospital course.

## 2023-12-26 NOTE — PROGRESS NOTE ADULT - ASSESSMENT
A/P  Following for the week.  ICU team knows current I?O sitiuation better than I and I will defer to them.  UO adequate this shift and combined GI abdominal losses.  Labs ok.  Ambulating more which is great.  wound manager in place.  Looks comfortable.   Will follow with ICU team  Obviously watching I/O's carefully.

## 2023-12-26 NOTE — ADVANCED PRACTICE NURSE CONSULT - ASSESSMENT
Stomatized fistula functioning with thick yellow liquid effluent. Donny-fistula skin with scattered denuded areas. Donny-fistula denuded skin bleeding slightly.   WOCN applied stoma powder to donny-fistula skin, sealed powder with skin prep, applied Surgicel to bleeding area, which controlled bleeding, applied ostomy barrier rings around fistula, sealed barrier rings with stoma paste, applied an Dustin fistula pouching system. Secured edges with sections of hydrocolloid skin barrier.   Right side of abdomen small opening draining yellow liquid drainage. Inserted red rubber catheter into opening, applied a one piece pouching system,end of catheter in pouch.   Stomatized fistula functioning with thick yellow liquid effluent. Donny-fistula skin with scattered denuded areas. Donny-fistula denuded skin bleeding slightly.   WOCN applied stoma powder to donny-fistula skin, sealed powder with skin prep, applied Surgicel to bleeding area, which controlled bleeding, applied ostomy barrier rings around fistula, sealed barrier rings with stoma paste, applied an Dustin fistula pouching system. Secured edges with sections of hydrocolloid skin barrier.   Right side of abdomen small opening draining yellow liquid drainage. Inserted red rubber catheter into opening, applied an ostomy ring around the skin opening, then a one piece convex pouching system, placed red rubber catheter in pouch.

## 2023-12-26 NOTE — PROGRESS NOTE ADULT - SUBJECTIVE AND OBJECTIVE BOX
SUBJECTIVE:  Pt seen this AM on rounds. No events o/n. Tolerating PO intake; denies nausea/vomiting     MEDICATIONS  (STANDING):  chlorhexidine 2% Cloths 1 Application(s) Topical <User Schedule>  cholestyramine Powder (Sugar-Free) 4 Gram(s) Oral daily  epoetin matt-epbx (RETACRIT) Injectable 09846 Unit(s) SubCutaneous every 7 days  ergocalciferol 58214 Unit(s) Oral <User Schedule>  glucagon  Injectable 1 milliGRAM(s) IntraMuscular once  heparin   Injectable 5000 Unit(s) SubCutaneous every 8 hours  labetalol Injectable 10 milliGRAM(s) IV Push every 6 hours  levothyroxine Injectable 30 MICROGram(s) IV Push at bedtime  lipid, fat emulsion (Fish Oil and Plant Based) 20% Infusion 0.54 Gm/kG/Day (20.83 mL/Hr) IV Continuous <Continuous>  lipid, fat emulsion (Fish Oil and Plant Based) 20% Infusion 0.5359 Gm/kG/Day (20.83 mL/Hr) IV Continuous <Continuous>  Parenteral Nutrition - Adult 1 Each TPN Continuous <Continuous>  Parenteral Nutrition - Adult 1 Each TPN Continuous <Continuous>  ursodiol Suspension 300 milliGRAM(s) Oral every 8 hours  venlafaxine XR. 150 milliGRAM(s) Oral daily    MEDICATIONS  (PRN):  hydrALAZINE Injectable 10 milliGRAM(s) IV Push every 6 hours PRN SBP >170  lidocaine 2% Viscous 10 milliLiter(s) Swish and Spit every 12 hours PRN tongue pain  LORazepam   Injectable 1 milliGRAM(s) IV Push <User Schedule> PRN Anxiety  ondansetron Injectable 4 milliGRAM(s) IV Push every 6 hours PRN Nausea and/or Vomiting      Vital Signs Last 24 Hrs  T(C): 36.3 (26 Dec 2023 09:00), Max: 36.6 (25 Dec 2023 21:59)  T(F): 97.3 (26 Dec 2023 09:00), Max: 97.8 (25 Dec 2023 21:59)  HR: 70 (26 Dec 2023 09:00) (59 - 84)  BP: 142/62 (26 Dec 2023 09:00) (133/62 - 187/77)  BP(mean): 89 (26 Dec 2023 09:00) (89 - 121)  RR: 57 (26 Dec 2023 09:00) (17 - 57)  SpO2: 100% (26 Dec 2023 09:00) (93% - 100%)    Parameters below as of 26 Dec 2023 09:00  Patient On (Oxygen Delivery Method): BiPAP/CPAP        Physical Exam:  General: NAD, resting comfortably in bed  Pulmonary: Nonlabored breathing, no respiratory distress  Cardiovascular: NSR  Abdominal: soft, NT/ND  Extremities: WWP, normal strength  Neuro: A/O x 3, CNs II-XII grossly intact, no focal deficits    I&O's Summary    25 Dec 2023 07:01  -  26 Dec 2023 07:00  --------------------------------------------------------  IN: 4103.6 mL / OUT: 4355 mL / NET: -251.4 mL        LABS:                        8.2    7.73  )-----------( 214      ( 26 Dec 2023 05:30 )             26.1     12-26    135  |  101  |  44<H>  ----------------------------<  139<H>  4.3   |  26  |  1.53<H>    Ca    8.6      26 Dec 2023 05:30  Phos  3.7     12-26  Mg     2.2     12-26    TPro  8.2  /  Alb  2.2<L>  /  TBili  6.6<H>  /  DBili  4.2<H>  /  AST  118<H>  /  ALT  77<H>  /  AlkPhos  126<H>  12-26      Urinalysis Basic - ( 26 Dec 2023 05:30 )    Color: x / Appearance: x / SG: x / pH: x  Gluc: 139 mg/dL / Ketone: x  / Bili: x / Urobili: x   Blood: x / Protein: x / Nitrite: x   Leuk Esterase: x / RBC: x / WBC x   Sq Epi: x / Non Sq Epi: x / Bacteria: x      CAPILLARY BLOOD GLUCOSE        LIVER FUNCTIONS - ( 26 Dec 2023 05:30 )  Alb: 2.2 g/dL / Pro: 8.2 g/dL / ALK PHOS: 126 U/L / ALT: 77 U/L / AST: 118 U/L / GGT: x             RADIOLOGY & ADDITIONAL STUDIES:    \ SUBJECTIVE:  Pt seen this AM on rounds. No events o/n. Tolerating PO intake; denies nausea/vomiting     MEDICATIONS  (STANDING):  chlorhexidine 2% Cloths 1 Application(s) Topical <User Schedule>  cholestyramine Powder (Sugar-Free) 4 Gram(s) Oral daily  epoetin matt-epbx (RETACRIT) Injectable 02242 Unit(s) SubCutaneous every 7 days  ergocalciferol 71191 Unit(s) Oral <User Schedule>  glucagon  Injectable 1 milliGRAM(s) IntraMuscular once  heparin   Injectable 5000 Unit(s) SubCutaneous every 8 hours  labetalol Injectable 10 milliGRAM(s) IV Push every 6 hours  levothyroxine Injectable 30 MICROGram(s) IV Push at bedtime  lipid, fat emulsion (Fish Oil and Plant Based) 20% Infusion 0.54 Gm/kG/Day (20.83 mL/Hr) IV Continuous <Continuous>  lipid, fat emulsion (Fish Oil and Plant Based) 20% Infusion 0.5359 Gm/kG/Day (20.83 mL/Hr) IV Continuous <Continuous>  Parenteral Nutrition - Adult 1 Each TPN Continuous <Continuous>  Parenteral Nutrition - Adult 1 Each TPN Continuous <Continuous>  ursodiol Suspension 300 milliGRAM(s) Oral every 8 hours  venlafaxine XR. 150 milliGRAM(s) Oral daily    MEDICATIONS  (PRN):  hydrALAZINE Injectable 10 milliGRAM(s) IV Push every 6 hours PRN SBP >170  lidocaine 2% Viscous 10 milliLiter(s) Swish and Spit every 12 hours PRN tongue pain  LORazepam   Injectable 1 milliGRAM(s) IV Push <User Schedule> PRN Anxiety  ondansetron Injectable 4 milliGRAM(s) IV Push every 6 hours PRN Nausea and/or Vomiting      Vital Signs Last 24 Hrs  T(C): 36.3 (26 Dec 2023 09:00), Max: 36.6 (25 Dec 2023 21:59)  T(F): 97.3 (26 Dec 2023 09:00), Max: 97.8 (25 Dec 2023 21:59)  HR: 70 (26 Dec 2023 09:00) (59 - 84)  BP: 142/62 (26 Dec 2023 09:00) (133/62 - 187/77)  BP(mean): 89 (26 Dec 2023 09:00) (89 - 121)  RR: 57 (26 Dec 2023 09:00) (17 - 57)  SpO2: 100% (26 Dec 2023 09:00) (93% - 100%)    Parameters below as of 26 Dec 2023 09:00  Patient On (Oxygen Delivery Method): BiPAP/CPAP        Physical Exam:  General: NAD, resting comfortably in bed  Pulmonary: Nonlabored breathing, no respiratory distress  Cardiovascular: NSR  Abdominal: soft, NT/ND  Extremities: WWP, normal strength  Neuro: A/O x 3, CNs II-XII grossly intact, no focal deficits    I&O's Summary    25 Dec 2023 07:01  -  26 Dec 2023 07:00  --------------------------------------------------------  IN: 4103.6 mL / OUT: 4355 mL / NET: -251.4 mL        LABS:                        8.2    7.73  )-----------( 214      ( 26 Dec 2023 05:30 )             26.1     12-26    135  |  101  |  44<H>  ----------------------------<  139<H>  4.3   |  26  |  1.53<H>    Ca    8.6      26 Dec 2023 05:30  Phos  3.7     12-26  Mg     2.2     12-26    TPro  8.2  /  Alb  2.2<L>  /  TBili  6.6<H>  /  DBili  4.2<H>  /  AST  118<H>  /  ALT  77<H>  /  AlkPhos  126<H>  12-26      Urinalysis Basic - ( 26 Dec 2023 05:30 )    Color: x / Appearance: x / SG: x / pH: x  Gluc: 139 mg/dL / Ketone: x  / Bili: x / Urobili: x   Blood: x / Protein: x / Nitrite: x   Leuk Esterase: x / RBC: x / WBC x   Sq Epi: x / Non Sq Epi: x / Bacteria: x      CAPILLARY BLOOD GLUCOSE        LIVER FUNCTIONS - ( 26 Dec 2023 05:30 )  Alb: 2.2 g/dL / Pro: 8.2 g/dL / ALK PHOS: 126 U/L / ALT: 77 U/L / AST: 118 U/L / GGT: x             RADIOLOGY & ADDITIONAL STUDIES:    \

## 2023-12-26 NOTE — PROGRESS NOTE ADULT - SUBJECTIVE AND OBJECTIVE BOX
SUBJECTIVE:      MEDICATIONS  (STANDING):  chlorhexidine 2% Cloths 1 Application(s) Topical <User Schedule>  cholestyramine Powder (Sugar-Free) 4 Gram(s) Oral daily  epoetin matt-epbx (RETACRIT) Injectable 15597 Unit(s) SubCutaneous every 7 days  ergocalciferol 25607 Unit(s) Oral <User Schedule>  glucagon  Injectable 1 milliGRAM(s) IntraMuscular once  heparin   Injectable 5000 Unit(s) SubCutaneous every 8 hours  labetalol Injectable 10 milliGRAM(s) IV Push every 6 hours  levothyroxine Injectable 30 MICROGram(s) IV Push at bedtime  lipid, fat emulsion (Fish Oil and Plant Based) 20% Infusion 0.54 Gm/kG/Day (20.83 mL/Hr) IV Continuous <Continuous>  lipid, fat emulsion (Fish Oil and Plant Based) 20% Infusion 0.5359 Gm/kG/Day (20.83 mL/Hr) IV Continuous <Continuous>  Parenteral Nutrition - Adult 1 Each TPN Continuous <Continuous>  Parenteral Nutrition - Adult 1 Each TPN Continuous <Continuous>  ursodiol Suspension 300 milliGRAM(s) Oral every 8 hours  venlafaxine XR. 150 milliGRAM(s) Oral daily    MEDICATIONS  (PRN):  hydrALAZINE Injectable 10 milliGRAM(s) IV Push every 6 hours PRN SBP >170  lidocaine 2% Viscous 10 milliLiter(s) Swish and Spit every 12 hours PRN tongue pain  LORazepam   Injectable 1 milliGRAM(s) IV Push <User Schedule> PRN Anxiety  ondansetron Injectable 4 milliGRAM(s) IV Push every 6 hours PRN Nausea and/or Vomiting      Vital Signs Last 24 Hrs  T(C): 36.3 (26 Dec 2023 09:00), Max: 36.6 (25 Dec 2023 21:59)  T(F): 97.3 (26 Dec 2023 09:00), Max: 97.8 (25 Dec 2023 21:59)  HR: 70 (26 Dec 2023 09:00) (59 - 84)  BP: 142/62 (26 Dec 2023 09:00) (133/62 - 187/77)  BP(mean): 89 (26 Dec 2023 09:00) (89 - 121)  RR: 57 (26 Dec 2023 09:00) (17 - 57)  SpO2: 100% (26 Dec 2023 09:00) (93% - 100%)    Parameters below as of 26 Dec 2023 09:00  Patient On (Oxygen Delivery Method): BiPAP/CPAP    Physical Exam:  Gen: NAD  ENT: mucus membrane moist  Cardiovascular: RRR  Respiratory: CTA  Gastrointestinal: soft, NT, ND, BS+, fistula thickish yellowish formed output, wound manager in place. perc cony bilious.  Extremities: warm, no dependent edema  Neurological: AAOx3, CNII-XII intact,  strength 5/5 b/l    I&O's Summary    25 Dec 2023 07:01  -  26 Dec 2023 07:00  --------------------------------------------------------  IN: 4103.6 mL / OUT: 4355 mL / NET: -251.4 mL        LABS:                        8.2    7.73  )-----------( 214      ( 26 Dec 2023 05:30 )             26.1     12-26    135  |  101  |  44<H>  ----------------------------<  139<H>  4.3   |  26  |  1.53<H>    Ca    8.6      26 Dec 2023 05:30  Phos  3.7     12-26  Mg     2.2     12-26    TPro  8.2  /  Alb  2.2<L>  /  TBili  6.6<H>  /  DBili  4.2<H>  /  AST  118<H>  /  ALT  77<H>  /  AlkPhos  126<H>  12-26      Urinalysis Basic - ( 26 Dec 2023 05:30 )    Color: x / Appearance: x / SG: x / pH: x  Gluc: 139 mg/dL / Ketone: x  / Bili: x / Urobili: x   Blood: x / Protein: x / Nitrite: x   Leuk Esterase: x / RBC: x / WBC x   Sq Epi: x / Non Sq Epi: x / Bacteria: x      CAPILLARY BLOOD GLUCOSE        LIVER FUNCTIONS - ( 26 Dec 2023 05:30 )  Alb: 2.2 g/dL / Pro: 8.2 g/dL / ALK PHOS: 126 U/L / ALT: 77 U/L / AST: 118 U/L / GGT: x             RADIOLOGY & ADDITIONAL STUDIES:   SUBJECTIVE:      MEDICATIONS  (STANDING):  chlorhexidine 2% Cloths 1 Application(s) Topical <User Schedule>  cholestyramine Powder (Sugar-Free) 4 Gram(s) Oral daily  epoetin matt-epbx (RETACRIT) Injectable 65144 Unit(s) SubCutaneous every 7 days  ergocalciferol 40535 Unit(s) Oral <User Schedule>  glucagon  Injectable 1 milliGRAM(s) IntraMuscular once  heparin   Injectable 5000 Unit(s) SubCutaneous every 8 hours  labetalol Injectable 10 milliGRAM(s) IV Push every 6 hours  levothyroxine Injectable 30 MICROGram(s) IV Push at bedtime  lipid, fat emulsion (Fish Oil and Plant Based) 20% Infusion 0.54 Gm/kG/Day (20.83 mL/Hr) IV Continuous <Continuous>  lipid, fat emulsion (Fish Oil and Plant Based) 20% Infusion 0.5359 Gm/kG/Day (20.83 mL/Hr) IV Continuous <Continuous>  Parenteral Nutrition - Adult 1 Each TPN Continuous <Continuous>  Parenteral Nutrition - Adult 1 Each TPN Continuous <Continuous>  ursodiol Suspension 300 milliGRAM(s) Oral every 8 hours  venlafaxine XR. 150 milliGRAM(s) Oral daily    MEDICATIONS  (PRN):  hydrALAZINE Injectable 10 milliGRAM(s) IV Push every 6 hours PRN SBP >170  lidocaine 2% Viscous 10 milliLiter(s) Swish and Spit every 12 hours PRN tongue pain  LORazepam   Injectable 1 milliGRAM(s) IV Push <User Schedule> PRN Anxiety  ondansetron Injectable 4 milliGRAM(s) IV Push every 6 hours PRN Nausea and/or Vomiting      Vital Signs Last 24 Hrs  T(C): 36.3 (26 Dec 2023 09:00), Max: 36.6 (25 Dec 2023 21:59)  T(F): 97.3 (26 Dec 2023 09:00), Max: 97.8 (25 Dec 2023 21:59)  HR: 70 (26 Dec 2023 09:00) (59 - 84)  BP: 142/62 (26 Dec 2023 09:00) (133/62 - 187/77)  BP(mean): 89 (26 Dec 2023 09:00) (89 - 121)  RR: 57 (26 Dec 2023 09:00) (17 - 57)  SpO2: 100% (26 Dec 2023 09:00) (93% - 100%)    Parameters below as of 26 Dec 2023 09:00  Patient On (Oxygen Delivery Method): BiPAP/CPAP    Physical Exam:  Gen: NAD  ENT: mucus membrane moist  Cardiovascular: RRR  Respiratory: CTA  Gastrointestinal: soft, NT, ND, BS+, fistula thickish yellowish formed output, wound manager in place. perc cony bilious.  Extremities: warm, no dependent edema  Neurological: AAOx3, CNII-XII intact,  strength 5/5 b/l    I&O's Summary    25 Dec 2023 07:01  -  26 Dec 2023 07:00  --------------------------------------------------------  IN: 4103.6 mL / OUT: 4355 mL / NET: -251.4 mL        LABS:                        8.2    7.73  )-----------( 214      ( 26 Dec 2023 05:30 )             26.1     12-26    135  |  101  |  44<H>  ----------------------------<  139<H>  4.3   |  26  |  1.53<H>    Ca    8.6      26 Dec 2023 05:30  Phos  3.7     12-26  Mg     2.2     12-26    TPro  8.2  /  Alb  2.2<L>  /  TBili  6.6<H>  /  DBili  4.2<H>  /  AST  118<H>  /  ALT  77<H>  /  AlkPhos  126<H>  12-26      Urinalysis Basic - ( 26 Dec 2023 05:30 )    Color: x / Appearance: x / SG: x / pH: x  Gluc: 139 mg/dL / Ketone: x  / Bili: x / Urobili: x   Blood: x / Protein: x / Nitrite: x   Leuk Esterase: x / RBC: x / WBC x   Sq Epi: x / Non Sq Epi: x / Bacteria: x      CAPILLARY BLOOD GLUCOSE        LIVER FUNCTIONS - ( 26 Dec 2023 05:30 )  Alb: 2.2 g/dL / Pro: 8.2 g/dL / ALK PHOS: 126 U/L / ALT: 77 U/L / AST: 118 U/L / GGT: x             RADIOLOGY & ADDITIONAL STUDIES:   SUBJECTIVE:      MEDICATIONS  (STANDING):  chlorhexidine 2% Cloths 1 Application(s) Topical <User Schedule>  cholestyramine Powder (Sugar-Free) 4 Gram(s) Oral daily  epoetin matt-epbx (RETACRIT) Injectable 76665 Unit(s) SubCutaneous every 7 days  ergocalciferol 47219 Unit(s) Oral <User Schedule>  glucagon  Injectable 1 milliGRAM(s) IntraMuscular once  heparin   Injectable 5000 Unit(s) SubCutaneous every 8 hours  labetalol Injectable 10 milliGRAM(s) IV Push every 6 hours  levothyroxine Injectable 30 MICROGram(s) IV Push at bedtime  lipid, fat emulsion (Fish Oil and Plant Based) 20% Infusion 0.54 Gm/kG/Day (20.83 mL/Hr) IV Continuous <Continuous>  lipid, fat emulsion (Fish Oil and Plant Based) 20% Infusion 0.5359 Gm/kG/Day (20.83 mL/Hr) IV Continuous <Continuous>  Parenteral Nutrition - Adult 1 Each TPN Continuous <Continuous>  Parenteral Nutrition - Adult 1 Each TPN Continuous <Continuous>  ursodiol Suspension 300 milliGRAM(s) Oral every 8 hours  venlafaxine XR. 150 milliGRAM(s) Oral daily    MEDICATIONS  (PRN):  hydrALAZINE Injectable 10 milliGRAM(s) IV Push every 6 hours PRN SBP >170  lidocaine 2% Viscous 10 milliLiter(s) Swish and Spit every 12 hours PRN tongue pain  LORazepam   Injectable 1 milliGRAM(s) IV Push <User Schedule> PRN Anxiety  ondansetron Injectable 4 milliGRAM(s) IV Push every 6 hours PRN Nausea and/or Vomiting      Vital Signs Last 24 Hrs  T(C): 36.3 (26 Dec 2023 09:00), Max: 36.6 (25 Dec 2023 21:59)  T(F): 97.3 (26 Dec 2023 09:00), Max: 97.8 (25 Dec 2023 21:59)  HR: 70 (26 Dec 2023 09:00) (59 - 84)  BP: 142/62 (26 Dec 2023 09:00) (133/62 - 187/77)  BP(mean): 89 (26 Dec 2023 09:00) (89 - 121)  RR: 57 (26 Dec 2023 09:00) (17 - 57)  SpO2: 100% (26 Dec 2023 09:00) (93% - 100%)    Parameters below as of 26 Dec 2023 09:00  Patient On (Oxygen Delivery Method): BiPAP/CPAP    Physical Exam:  Gen: NAD  ENT: mucus membrane moist  Cardiovascular: RRR  Respiratory: CTA  Gastrointestinal: soft, NT, ND, BS+, fistula thickish yellowish formed output, wound manager in place. perc cony bilious.  Extremities: warm, no dependent edema  Neurological: AAOx3, CNII-XII intact,  strength 5/5 b/l    I&O's Summary    25 Dec 2023 07:01  -  26 Dec 2023 07:00  --------------------------------------------------------  IN: 4103.6 mL / OUT: 4355 mL / NET: -251.4 mL        LABS:                        8.2    7.73  )-----------( 214      ( 26 Dec 2023 05:30 )             26.1     12-26    135  |  101  |  44<H>  ----------------------------<  139<H>  4.3   |  26  |  1.53<H>    Ca    8.6      26 Dec 2023 05:30  Phos  3.7     12-26  Mg     2.2     12-26    TPro  8.2  /  Alb  2.2<L>  /  TBili  6.6<H>  /  DBili  4.2<H>  /  AST  118<H>  /  ALT  77<H>  /  AlkPhos  126<H>  12-26      Urinalysis Basic - ( 26 Dec 2023 05:30 )    Color: x / Appearance: x / SG: x / pH: x  Gluc: 139 mg/dL / Ketone: x  / Bili: x / Urobili: x   Blood: x / Protein: x / Nitrite: x   Leuk Esterase: x / RBC: x / WBC x   Sq Epi: x / Non Sq Epi: x / Bacteria: x      CAPILLARY BLOOD GLUCOSE        LIVER FUNCTIONS - ( 26 Dec 2023 05:30 )  Alb: 2.2 g/dL / Pro: 8.2 g/dL / ALK PHOS: 126 U/L / ALT: 77 U/L / AST: 118 U/L / GGT: x             RADIOLOGY & ADDITIONAL STUDIES:

## 2023-12-26 NOTE — CHART NOTE - NSCHARTNOTEFT_GEN_A_CORE
Admitting Diagnosis:   Patient is a 78y old  Male who presents with a chief complaint of SBO (26 Dec 2023 11:33)      PAST MEDICAL & SURGICAL HISTORY:  Essential hypertension  Hypertension      Atrial fibrillation  s/p cardioversion  and   Pt. reports 4 DCCV  Now on Amiodarone 200mg PO bid and Eliquis 5mg PO bid  Last DCCV 4 yrs ago at [facility]      Crohn's disease  s/p partial resection of ileum      Hyperlipidemia      Hypothyroidism      History of depression  On Venlafaxine ER 150mg PO bid      Junctional rhythm      H/O knee surgery      History of cataract surgery          Current Nutrition Order:   TPN via PICC- 1.9L bag running over 14hrs (135ml/hr), providing 380g Dex, 120g AA, 50g SMOF lipids daily; provides 2272 kcals, 120g protein daily, GIR 4.86 mg/kg/min   PO- Regular diet + 2 LPS per day [provide 100 kcals, 15g protein each], + Ensure Max daily [150 kcals, 20g protein]    PO Intake: Good (%) [   ]  Fair (50-75%) [ x  ] Poor (<25%) [   ]    GI Issues:   : ileostomy output 50cc x 24hrs, abdominal wound/fistula output 1175cc x 24hrs, per cony output 1050cc x 24hrs    : ileostomy output 50cc x 24hrs, abdominal wound/fistula output 1100cc x 24hrs, per cony output 1050cc x 24hrs       : ileostomy output 20cc x 24hrs, abdominal wound/fistula output 1275cc x 24hrs, per cony output 775cc x 24hrs      : ileostomy output 15cc x 24hrs, abdominal wound/fistula output  1230cc x 24hrs, per cony output 1010cc x 24hrs      : ileostomy output 75cc x 24hrs, abdominal wound/fistula output  600cc x 24hrs, per cony output 1125cc x 24hrs      : ileostomy output 35cc x 24hrs, abdominal wound/fistula output  250cc x 24hrs, per cony output 745cc x 24hrs      : ileostomy output 30cc x 24hrs, abdominal wound/fistula output  2400cc x 24hrs, per cony output 550cc x 24hrs     : ileostomy output 50 cc x 24hrs, abdominal wound/fistula output 2175 cc x 24hrs, per cony output 530 cc x 24hrs     : ileostomy output 25cc x 24hrs, abdominal wound/fistula output 2350 cc x 24hrs, per cony output 775cc x 24hrs     : ileostomy output 55cc x 24hrs, abdominal wound/fistula output 2025 cc x 24hrs, per cony output 450cc x 24hrs     11/15:  ileostomy output 65cc x 24hrs, abdominal wound/fistula output 875cc x 24hrs, per cony output 775cc x 24hrs    Pain: no pain/discomfort noted    Skin Integrity: lower back wound/healed, jaundice, abd wound and fistula with wound manager in place, RLQ ileostomy, perc cony drain, Rudy score 17    Labs:       135  |  101  |  44<H>  ----------------------------<  139<H>  4.3   |  26  |  1.53<H>    Ca    8.6      26 Dec 2023 05:30  Phos  3.7       Mg     2.2         TPro  8.2  /  Alb  2.2<L>  /  TBili  6.6<H>  /  DBili  4.2<H>  /  AST  118<H>  /  ALT  77<H>  /  AlkPhos  126<H>      CAPILLARY BLOOD GLUCOSE          Medications:  MEDICATIONS  (STANDING):  chlorhexidine 2% Cloths 1 Application(s) Topical <User Schedule>  cholestyramine Powder (Sugar-Free) 4 Gram(s) Oral daily  epoetin matt-epbx (RETACRIT) Injectable 85611 Unit(s) SubCutaneous every 7 days  ergocalciferol 65218 Unit(s) Oral <User Schedule>  glucagon  Injectable 1 milliGRAM(s) IntraMuscular once  heparin   Injectable 5000 Unit(s) SubCutaneous every 8 hours  labetalol Injectable 10 milliGRAM(s) IV Push every 6 hours  levothyroxine Injectable 30 MICROGram(s) IV Push at bedtime  lipid, fat emulsion (Fish Oil and Plant Based) 20% Infusion 0.54 Gm/kG/Day (20.83 mL/Hr) IV Continuous <Continuous>  Parenteral Nutrition - Adult 1 Each TPN Continuous <Continuous>  Parenteral Nutrition - Adult 1 Each TPN Continuous <Continuous>  ursodiol Suspension 300 milliGRAM(s) Oral every 8 hours  venlafaxine XR. 150 milliGRAM(s) Oral daily    MEDICATIONS  (PRN):  hydrALAZINE Injectable 10 milliGRAM(s) IV Push every 6 hours PRN SBP >170  lidocaine 2% Viscous 10 milliLiter(s) Swish and Spit every 12 hours PRN tongue pain  LORazepam   Injectable 1 milliGRAM(s) IV Push <User Schedule> PRN Anxiety  ondansetron Injectable 4 milliGRAM(s) IV Push every 6 hours PRN Nausea and/or Vomiting      Daily Weight in k.3kg [19 Dec 2023]  Daily 93.3kg [11 Dec 2023]  Daily 93.3kg [08 Dec 2023]  Daily 93.3kg [6 Dec 2023]  Daily 94.2kg [2023]  Daily 94.4kg [2023]  Daily 94.2kg [2023]  Daily 94.2kg [2023]  Daily 94.2kg [2023]  Daily 94.2 [15 Nov 2023]  Daily 94.2kg [2023]  Daily 95.8kg [2023]  Daily 94.2kg [2023]  Daily 85.2kg [2023]  Daily 85.2kg [31 Oct 2023]  Daily 85.2kg [24 Oct 2023]  Daily 85.2kg [20 Oct 2023]  Daily 81.9kg [18 Oct 2023]  Daily 85.2kg [16 Oct 2023]  Daily   95.2kg [12 Oct 2023]   Daily 87.7kg [11 Oct 2023]  Daily 87.4kg [09 Oct 2023]  Daily 86.4kg [07 Oct 2023]  Daily 82.5kg [06 Oct 2023]  Daily 82.6kg [4 Oct 2023]   Daily 83.5kg [03 Oct 2023]  Daily 84.5kg [2 Oct 2023]  Daily 87.2kg [1 Oct 2023]  Daily 88.3kg [29 Sep 2023]  Daily 89.9kg [28 Sep 2023]  Daily 87.7kg [24 Sep 2023]  Daily 90.4kg [21 Sep 2023]  Daily 91.4kg [20 Sep 2023]  Daily 86.7kg [18 Sep 2023]  Daily 88.1kg [16 Sep 2023]  Daily 88.9kg [15 Sep 2023]   Daily 89.1 [14 Sep 2023]  Daily 92.9kg [6 Sep 2023]  Daily 91.8kg [27 Aug 2023]  Daily 101kg [9 Aug 2023]     Weight Change: weight trending down throughout admission, now appears to be trending back up. Recommend continue weekly/daily weights for trending & ensuring adequacy of nutrition provision    IBW: 86.4kg  % IBW: 108.0    Estimated energy needs:   Ideal body weight (86.4kg) used for calculations as pt >100% IBW and BMI <30 per [facility] Standards of Care. Needs estimated for age and adjusted for current clinical status, increased needs for post-op & open abd wound healing    Calories: 0967-5127 kcals based on 30-40 kcals/kg  Protein: 129.6-172.8 g protein based on 1.5-2.0g protein/kg + additional depending on outputs  *Fluid needs per team    Subjective:   78 M w/ Crohn's, AFib/Flutter s/p DCCVs on amiodarone, remote ileocectomy and open appendectomy. Admitted () for SBO vs Crohns flare, s/p NGT decompression and s/p lap TRE converted to open TRE, SBR x 3, left in discontinuity with abthera vac on (), RTOR for ileocolic resection, small bowel anastomosis, and abdominal wall closure on (), c/b fluid collection s/p IR aspiration of perihepatic fluid on (7/3), c/b wound dehiscence s/p RTOR exlap, washout, ileocolic resection with end ileostomy, blow hole colostomy, red rubber from ileostomy to small bowel anastomosis; vicryl bridging mesh on () transferred to SICU postoperatively for hemodynamic monitoring, with hospital course complicated by periods of AMS most recently on  and associated with oliguria, severe ELVI and hyponatremia, which resolved but stepped back up for to SICU on 9/10 for acute AMS, intermittent hypoglycemia, AFib with RVR. Percutaneous Cholecystostomy placed on  for enlarged GB, PCT capped 10/5.    Chart reviewed, discussed with team. Pt seen sleeping on 5 EA, on bipap overnight. TPN remains primary means to nutrition, receiving lipids daily. Some improvement noted in bilirubin today. Vit D remains low, pending zinc/copper/selenium results. TPN reordered for tonight. Labs reviewed- Na 135 [improved], BUN 44 <H>, Creat 1.53 <H> [improved], total bilirubin 6.6 <H> [improving], direct bilirubin 4.2 <H>, Alk phos 126 <H>,  <H>, ALT 77 <H>,  <H> [], vit D 6.1 []. Meds- zofran prn, synthroid [administer 30-60 minutes before food; separate at least 4hrs from calcium or iron-containing products or bile acid sequestrants], vit D weekly, EPO, cholestyramine, ursodiol. RDN will continue to monitor, reassess, and intervene as appropriate.     Previous Nutrition Diagnosis:  Increased Nutrient Needs R/T physiological demands for nutrient AEB post-op wound healing    Active [ x  ]  Resolved [   ]    If resolved, new PES:     Goal: Pt will meet at least 75% of protein & energy needs via most appropriate route for nutrition     Recommendations:  1. c/w TPN via PICC as primary means to nutrition - recommend 1.9L bag running over 14hrs (135ml/hr), providing 380g Dex, 120g AA, 50g SMOF lipids daily; provides 2272 kcals, 120g protein daily, GIR 4.86 mg/kg/min   - provides 26.3 kcals/kg, 20.7 non-protein kcals/kg, 1.4g protein/kg  - monitor weights & wound healing, adjust substrates as indicated  - fluid & lytes per team  - MVI, thiamine, vit C in bag  2. Monitor copper, zinc, selenium  - consider providing Vit D3 and rechecking levels  3. PO diet per team discretion  -  c/w Regular diet as medically feasible to allow for more menu options  - c/w 1 LPS BID [200 kcals, 30g protein] + 1 Ensure Max daily [150 kcals, 30g protein] as amenable  - consider NPO for bowel rest as indicated   - ongoing education prn  4. Hydration per team  - risk for dehydration with high outputs, monitor  - additional fluids per team  - consider oral rehydration solution  5. Weekly lipid panel while on TPN  - hold/decrease lipids if TG >400  - continue to trend LFTs  6. Monitor BMP/Mg/Phos, POC BG while on TPN  - monitor & replenish lytes outside TPN bag prn  7. Continue close monitoring of clinical course & adjust recommendations prn    Education: ongoing diet education    Risk Level: High [ x  ] Moderate [   ] Low [   ] Admitting Diagnosis:   Patient is a 78y old  Male who presents with a chief complaint of SBO (26 Dec 2023 11:33)      PAST MEDICAL & SURGICAL HISTORY:  Essential hypertension  Hypertension      Atrial fibrillation  s/p cardioversion  and   Pt. reports 4 DCCV  Now on Amiodarone 200mg PO bid and Eliquis 5mg PO bid  Last DCCV 4 yrs ago at [facility]      Crohn's disease  s/p partial resection of ileum      Hyperlipidemia      Hypothyroidism      History of depression  On Venlafaxine ER 150mg PO bid      Junctional rhythm      H/O knee surgery      History of cataract surgery          Current Nutrition Order:   TPN via PICC- 1.9L bag running over 14hrs (135ml/hr), providing 380g Dex, 120g AA, 50g SMOF lipids daily; provides 2272 kcals, 120g protein daily, GIR 4.86 mg/kg/min   PO- Regular diet + 2 LPS per day [provide 100 kcals, 15g protein each], + Ensure Max daily [150 kcals, 20g protein]    PO Intake: Good (%) [   ]  Fair (50-75%) [ x  ] Poor (<25%) [   ]    GI Issues:   : ileostomy output 50cc x 24hrs, abdominal wound/fistula output 1175cc x 24hrs, per cony output 1050cc x 24hrs    : ileostomy output 50cc x 24hrs, abdominal wound/fistula output 1100cc x 24hrs, per cony output 1050cc x 24hrs       : ileostomy output 20cc x 24hrs, abdominal wound/fistula output 1275cc x 24hrs, per cony output 775cc x 24hrs      : ileostomy output 15cc x 24hrs, abdominal wound/fistula output  1230cc x 24hrs, per cony output 1010cc x 24hrs      : ileostomy output 75cc x 24hrs, abdominal wound/fistula output  600cc x 24hrs, per cony output 1125cc x 24hrs      : ileostomy output 35cc x 24hrs, abdominal wound/fistula output  250cc x 24hrs, per cony output 745cc x 24hrs      : ileostomy output 30cc x 24hrs, abdominal wound/fistula output  2400cc x 24hrs, per cony output 550cc x 24hrs     : ileostomy output 50 cc x 24hrs, abdominal wound/fistula output 2175 cc x 24hrs, per cony output 530 cc x 24hrs     : ileostomy output 25cc x 24hrs, abdominal wound/fistula output 2350 cc x 24hrs, per cony output 775cc x 24hrs     : ileostomy output 55cc x 24hrs, abdominal wound/fistula output 2025 cc x 24hrs, per cony output 450cc x 24hrs     11/15:  ileostomy output 65cc x 24hrs, abdominal wound/fistula output 875cc x 24hrs, per cony output 775cc x 24hrs    Pain: no pain/discomfort noted    Skin Integrity: lower back wound/healed, jaundice, abd wound and fistula with wound manager in place, RLQ ileostomy, perc cony drain, Rudy score 17    Labs:       135  |  101  |  44<H>  ----------------------------<  139<H>  4.3   |  26  |  1.53<H>    Ca    8.6      26 Dec 2023 05:30  Phos  3.7       Mg     2.2         TPro  8.2  /  Alb  2.2<L>  /  TBili  6.6<H>  /  DBili  4.2<H>  /  AST  118<H>  /  ALT  77<H>  /  AlkPhos  126<H>      CAPILLARY BLOOD GLUCOSE          Medications:  MEDICATIONS  (STANDING):  chlorhexidine 2% Cloths 1 Application(s) Topical <User Schedule>  cholestyramine Powder (Sugar-Free) 4 Gram(s) Oral daily  epoetin matt-epbx (RETACRIT) Injectable 69302 Unit(s) SubCutaneous every 7 days  ergocalciferol 60282 Unit(s) Oral <User Schedule>  glucagon  Injectable 1 milliGRAM(s) IntraMuscular once  heparin   Injectable 5000 Unit(s) SubCutaneous every 8 hours  labetalol Injectable 10 milliGRAM(s) IV Push every 6 hours  levothyroxine Injectable 30 MICROGram(s) IV Push at bedtime  lipid, fat emulsion (Fish Oil and Plant Based) 20% Infusion 0.54 Gm/kG/Day (20.83 mL/Hr) IV Continuous <Continuous>  Parenteral Nutrition - Adult 1 Each TPN Continuous <Continuous>  Parenteral Nutrition - Adult 1 Each TPN Continuous <Continuous>  ursodiol Suspension 300 milliGRAM(s) Oral every 8 hours  venlafaxine XR. 150 milliGRAM(s) Oral daily    MEDICATIONS  (PRN):  hydrALAZINE Injectable 10 milliGRAM(s) IV Push every 6 hours PRN SBP >170  lidocaine 2% Viscous 10 milliLiter(s) Swish and Spit every 12 hours PRN tongue pain  LORazepam   Injectable 1 milliGRAM(s) IV Push <User Schedule> PRN Anxiety  ondansetron Injectable 4 milliGRAM(s) IV Push every 6 hours PRN Nausea and/or Vomiting      Daily Weight in k.3kg [19 Dec 2023]  Daily 93.3kg [11 Dec 2023]  Daily 93.3kg [08 Dec 2023]  Daily 93.3kg [6 Dec 2023]  Daily 94.2kg [2023]  Daily 94.4kg [2023]  Daily 94.2kg [2023]  Daily 94.2kg [2023]  Daily 94.2kg [2023]  Daily 94.2 [15 Nov 2023]  Daily 94.2kg [2023]  Daily 95.8kg [2023]  Daily 94.2kg [2023]  Daily 85.2kg [2023]  Daily 85.2kg [31 Oct 2023]  Daily 85.2kg [24 Oct 2023]  Daily 85.2kg [20 Oct 2023]  Daily 81.9kg [18 Oct 2023]  Daily 85.2kg [16 Oct 2023]  Daily   95.2kg [12 Oct 2023]   Daily 87.7kg [11 Oct 2023]  Daily 87.4kg [09 Oct 2023]  Daily 86.4kg [07 Oct 2023]  Daily 82.5kg [06 Oct 2023]  Daily 82.6kg [4 Oct 2023]   Daily 83.5kg [03 Oct 2023]  Daily 84.5kg [2 Oct 2023]  Daily 87.2kg [1 Oct 2023]  Daily 88.3kg [29 Sep 2023]  Daily 89.9kg [28 Sep 2023]  Daily 87.7kg [24 Sep 2023]  Daily 90.4kg [21 Sep 2023]  Daily 91.4kg [20 Sep 2023]  Daily 86.7kg [18 Sep 2023]  Daily 88.1kg [16 Sep 2023]  Daily 88.9kg [15 Sep 2023]   Daily 89.1 [14 Sep 2023]  Daily 92.9kg [6 Sep 2023]  Daily 91.8kg [27 Aug 2023]  Daily 101kg [9 Aug 2023]     Weight Change: weight trending down throughout admission, now appears to be trending back up. Recommend continue weekly/daily weights for trending & ensuring adequacy of nutrition provision    IBW: 86.4kg  % IBW: 108.0    Estimated energy needs:   Ideal body weight (86.4kg) used for calculations as pt >100% IBW and BMI <30 per [facility] Standards of Care. Needs estimated for age and adjusted for current clinical status, increased needs for post-op & open abd wound healing    Calories: 2555-2756 kcals based on 30-40 kcals/kg  Protein: 129.6-172.8 g protein based on 1.5-2.0g protein/kg + additional depending on outputs  *Fluid needs per team    Subjective:   78 M w/ Crohn's, AFib/Flutter s/p DCCVs on amiodarone, remote ileocectomy and open appendectomy. Admitted () for SBO vs Crohns flare, s/p NGT decompression and s/p lap TRE converted to open TRE, SBR x 3, left in discontinuity with abthera vac on (), RTOR for ileocolic resection, small bowel anastomosis, and abdominal wall closure on (), c/b fluid collection s/p IR aspiration of perihepatic fluid on (7/3), c/b wound dehiscence s/p RTOR exlap, washout, ileocolic resection with end ileostomy, blow hole colostomy, red rubber from ileostomy to small bowel anastomosis; vicryl bridging mesh on () transferred to SICU postoperatively for hemodynamic monitoring, with hospital course complicated by periods of AMS most recently on  and associated with oliguria, severe ELVI and hyponatremia, which resolved but stepped back up for to SICU on 9/10 for acute AMS, intermittent hypoglycemia, AFib with RVR. Percutaneous Cholecystostomy placed on  for enlarged GB, PCT capped 10/5.    Chart reviewed, discussed with team. Pt seen sleeping on 5 EA, on bipap overnight. TPN remains primary means to nutrition, receiving lipids daily. Some improvement noted in bilirubin today. Vit D remains low, pending zinc/copper/selenium results. TPN reordered for tonight. Labs reviewed- Na 135 [improved], BUN 44 <H>, Creat 1.53 <H> [improved], total bilirubin 6.6 <H> [improving], direct bilirubin 4.2 <H>, Alk phos 126 <H>,  <H>, ALT 77 <H>,  <H> [], vit D 6.1 []. Meds- zofran prn, synthroid [administer 30-60 minutes before food; separate at least 4hrs from calcium or iron-containing products or bile acid sequestrants], vit D weekly, EPO, cholestyramine, ursodiol. RDN will continue to monitor, reassess, and intervene as appropriate.     Previous Nutrition Diagnosis:  Increased Nutrient Needs R/T physiological demands for nutrient AEB post-op wound healing    Active [ x  ]  Resolved [   ]    If resolved, new PES:     Goal: Pt will meet at least 75% of protein & energy needs via most appropriate route for nutrition     Recommendations:  1. c/w TPN via PICC as primary means to nutrition - recommend 1.9L bag running over 14hrs (135ml/hr), providing 380g Dex, 120g AA, 50g SMOF lipids daily; provides 2272 kcals, 120g protein daily, GIR 4.86 mg/kg/min   - provides 26.3 kcals/kg, 20.7 non-protein kcals/kg, 1.4g protein/kg  - monitor weights & wound healing, adjust substrates as indicated  - fluid & lytes per team  - MVI, thiamine, vit C in bag  2. Monitor copper, zinc, selenium  - consider providing Vit D3 and rechecking levels  3. PO diet per team discretion  -  c/w Regular diet as medically feasible to allow for more menu options  - c/w 1 LPS BID [200 kcals, 30g protein] + 1 Ensure Max daily [150 kcals, 30g protein] as amenable  - consider NPO for bowel rest as indicated   - ongoing education prn  4. Hydration per team  - risk for dehydration with high outputs, monitor  - additional fluids per team  - consider oral rehydration solution  5. Weekly lipid panel while on TPN  - hold/decrease lipids if TG >400  - continue to trend LFTs  6. Monitor BMP/Mg/Phos, POC BG while on TPN  - monitor & replenish lytes outside TPN bag prn  7. Continue close monitoring of clinical course & adjust recommendations prn    Education: ongoing diet education    Risk Level: High [ x  ] Moderate [   ] Low [   ]

## 2023-12-26 NOTE — PROGRESS NOTE ADULT - SUBJECTIVE AND OBJECTIVE BOX
S: No acute events overnight. Patient denies any chest pain, abdominal pain, nausea/vomiting. No new issues.     O: ICU Vital Signs Last 24 Hrs  T(F): 97.3 (12-26-23 @ 09:00), Max: 97.8 (12-25-23 @ 21:59)  HR: 78 (12-26-23 @ 11:30) (59 - 84)  BP: 164/71 (12-26-23 @ 10:00) (133/62 - 187/77)  BP(mean): 102 (12-26-23 @ 10:00) (89 - 121)  ABP: --  RR: 20 (12-26-23 @ 11:30) (19 - 57)  SpO2: 100% (12-26-23 @ 11:30) (93% - 100%)    PHYSICAL EXAM:   General: NAD, sitting in chair   ENT: mucus membrane moist  Cardiovascular: RRR, no murmurs   Respiratory: CTAB  Gastrointestinal: soft, NT, ND, BS+. Thick yellow fistula output. Wound manager in place, perc cony bilious   Extremities: warm, no dependent edema  Neuro: AAOx3     LABS:    12-26    135  |  101  |  44<H>  ----------------------------<  139<H>  4.3   |  26  |  1.53<H>    Ca    8.6      26 Dec 2023 05:30  Phos  3.7     12-26  Mg     2.2     12-26    TPro  8.2  /  Alb  2.2<L>  /  TBili  6.6<H>  /  DBili  4.2<H>  /  AST  118<H>  /  ALT  77<H>  /  AlkPhos  126<H>  12-26  LIVER FUNCTIONS - ( 26 Dec 2023 05:30 )  Alb: 2.2 g/dL / Pro: 8.2 g/dL / ALK PHOS: 126 U/L / ALT: 77 U/L / AST: 118 U/L / GGT: x                               8.2    7.73  )-----------( 214      ( 26 Dec 2023 05:30 )             26.1     Urinalysis Basic - ( 26 Dec 2023 05:30 )    Color: x / Appearance: x / SG: x / pH: x  Gluc: 139 mg/dL / Ketone: x  / Bili: x / Urobili: x   Blood: x / Protein: x / Nitrite: x   Leuk Esterase: x / RBC: x / WBC x   Sq Epi: x / Non Sq Epi: x / Bacteria: x    CAPILLARY BLOOD GLUCOSE        MEDICATIONS  (STANDING):  chlorhexidine 2% Cloths 1 Application(s) Topical <User Schedule>  cholestyramine Powder (Sugar-Free) 4 Gram(s) Oral daily  epoetin matt-epbx (RETACRIT) Injectable 96658 Unit(s) SubCutaneous every 7 days  ergocalciferol 83832 Unit(s) Oral <User Schedule>  glucagon  Injectable 1 milliGRAM(s) IntraMuscular once  heparin   Injectable 5000 Unit(s) SubCutaneous every 8 hours  labetalol Injectable 10 milliGRAM(s) IV Push every 6 hours  levothyroxine Injectable 30 MICROGram(s) IV Push at bedtime  lipid, fat emulsion (Fish Oil and Plant Based) 20% Infusion 0.54 Gm/kG/Day (20.83 mL/Hr) IV Continuous <Continuous>  Parenteral Nutrition - Adult 1 Each TPN Continuous <Continuous>  Parenteral Nutrition - Adult 1 Each TPN Continuous <Continuous>  ursodiol Suspension 300 milliGRAM(s) Oral every 8 hours  venlafaxine XR. 150 milliGRAM(s) Oral daily    MEDICATIONS  (PRN):  hydrALAZINE Injectable 10 milliGRAM(s) IV Push every 6 hours PRN SBP >170  lidocaine 2% Viscous 10 milliLiter(s) Swish and Spit every 12 hours PRN tongue pain  LORazepam   Injectable 1 milliGRAM(s) IV Push <User Schedule> PRN Anxiety  ondansetron Injectable 4 milliGRAM(s) IV Push every 6 hours PRN Nausea and/or Vomiting      Poole:	  [ ] None	[ ] Daily Poole Order Placed	   Indication:	  [ ] Strict I and O's    [ ] Obstruction     [ ] Incontinence + Stage 3 or 4 Decubitus  Central Line:  [ ] None	   [ ]  Medication / TPN Administration     [ ] No Peripheral IV        S: No acute events overnight. Patient denies any chest pain, abdominal pain, nausea/vomiting. No new issues.     O: ICU Vital Signs Last 24 Hrs  T(F): 97.3 (12-26-23 @ 09:00), Max: 97.8 (12-25-23 @ 21:59)  HR: 78 (12-26-23 @ 11:30) (59 - 84)  BP: 164/71 (12-26-23 @ 10:00) (133/62 - 187/77)  BP(mean): 102 (12-26-23 @ 10:00) (89 - 121)  ABP: --  RR: 20 (12-26-23 @ 11:30) (19 - 57)  SpO2: 100% (12-26-23 @ 11:30) (93% - 100%)    PHYSICAL EXAM:   General: NAD, sitting in chair   ENT: mucus membrane moist  Cardiovascular: RRR, no murmurs   Respiratory: CTAB  Gastrointestinal: soft, NT, ND, BS+. Thick yellow fistula output. Wound manager in place, perc cony bilious   Extremities: warm, no dependent edema  Neuro: AAOx3     LABS:    12-26    135  |  101  |  44<H>  ----------------------------<  139<H>  4.3   |  26  |  1.53<H>    Ca    8.6      26 Dec 2023 05:30  Phos  3.7     12-26  Mg     2.2     12-26    TPro  8.2  /  Alb  2.2<L>  /  TBili  6.6<H>  /  DBili  4.2<H>  /  AST  118<H>  /  ALT  77<H>  /  AlkPhos  126<H>  12-26  LIVER FUNCTIONS - ( 26 Dec 2023 05:30 )  Alb: 2.2 g/dL / Pro: 8.2 g/dL / ALK PHOS: 126 U/L / ALT: 77 U/L / AST: 118 U/L / GGT: x                               8.2    7.73  )-----------( 214      ( 26 Dec 2023 05:30 )             26.1     Urinalysis Basic - ( 26 Dec 2023 05:30 )    Color: x / Appearance: x / SG: x / pH: x  Gluc: 139 mg/dL / Ketone: x  / Bili: x / Urobili: x   Blood: x / Protein: x / Nitrite: x   Leuk Esterase: x / RBC: x / WBC x   Sq Epi: x / Non Sq Epi: x / Bacteria: x    CAPILLARY BLOOD GLUCOSE        MEDICATIONS  (STANDING):  chlorhexidine 2% Cloths 1 Application(s) Topical <User Schedule>  cholestyramine Powder (Sugar-Free) 4 Gram(s) Oral daily  epoetin matt-epbx (RETACRIT) Injectable 38594 Unit(s) SubCutaneous every 7 days  ergocalciferol 14974 Unit(s) Oral <User Schedule>  glucagon  Injectable 1 milliGRAM(s) IntraMuscular once  heparin   Injectable 5000 Unit(s) SubCutaneous every 8 hours  labetalol Injectable 10 milliGRAM(s) IV Push every 6 hours  levothyroxine Injectable 30 MICROGram(s) IV Push at bedtime  lipid, fat emulsion (Fish Oil and Plant Based) 20% Infusion 0.54 Gm/kG/Day (20.83 mL/Hr) IV Continuous <Continuous>  Parenteral Nutrition - Adult 1 Each TPN Continuous <Continuous>  Parenteral Nutrition - Adult 1 Each TPN Continuous <Continuous>  ursodiol Suspension 300 milliGRAM(s) Oral every 8 hours  venlafaxine XR. 150 milliGRAM(s) Oral daily    MEDICATIONS  (PRN):  hydrALAZINE Injectable 10 milliGRAM(s) IV Push every 6 hours PRN SBP >170  lidocaine 2% Viscous 10 milliLiter(s) Swish and Spit every 12 hours PRN tongue pain  LORazepam   Injectable 1 milliGRAM(s) IV Push <User Schedule> PRN Anxiety  ondansetron Injectable 4 milliGRAM(s) IV Push every 6 hours PRN Nausea and/or Vomiting      Poole:	  [ ] None	[ ] Daily Poole Order Placed	   Indication:	  [ ] Strict I and O's    [ ] Obstruction     [ ] Incontinence + Stage 3 or 4 Decubitus  Central Line:  [ ] None	   [ ]  Medication / TPN Administration     [ ] No Peripheral IV

## 2023-12-26 NOTE — PROGRESS NOTE ADULT - ASSESSMENT
78M w PMH Crohn's, AFib/Flutter s/p DCCVs on amiodarone, remote ileocectomy and open appendectomy. Admitted (6/23) for SBO vs Crohns flare, s/p NGT decompression and s/p lap converted to open TRE, SBR x 3, left in discontinuity with abthera vac on (6/27), RTOR for ileocolic resection, small bowel anastomosis, and abdominal wall closure on (6/28), c/b fluid collection s/p IR aspiration of perihepatic fluid on (7/3), c/b wound dehiscence s/p RTOR exlap, washout, ileocolic resection with end ileostomy, blow hole colostomy, red rubber from ileostomy to small bowel anastomosis; vicryl bridging mesh on (7/5) transferred to SICU postoperatively for hemodynamic monitoring, with hospital course complicated by periods of severe ELVI and hyponatremia, which resolved but stepped back up for to SICU on (9/10) for acute AMS, intermittent hypoglycemia, AFib with RVR. Percutaneous Cholecystostomy placed on (9/11) for enlarged GB, PCT capped 10/5 and uncapped 10/25 for hyperbilirubinemia, Right brachial DVT, left basillic and cephalic superficial thrombus on duplex 11/2, CT 11/14 with ALDEN ground glass opacity of unclear etiology, completed empiric 7day cefepime course 11/22, rising T-bili of unclear etilogy, now w resolved candida glabrata fungemia, ELVI improving.     cw diet  cw supportive care  Wound care as per ostomy nurses   Continue excellent care per SICU

## 2023-12-26 NOTE — PROGRESS NOTE ADULT - SUBJECTIVE AND OBJECTIVE BOX
Colorectal Surgery  Covering for Dr. Peterson  5 ICU  TPN continues    Patient is ambulating in hallway when i rounded.  Taking diet po and tolerating it.  Wound manager changed today  Not c/o pain  Doing PT    Vital Signs Last 24 Hrs  T(C): 36.4 (26 Dec 2023 13:27), Max: 36.6 (25 Dec 2023 21:59)  T(F): 97.5 (26 Dec 2023 13:27), Max: 97.8 (25 Dec 2023 21:59)  HR: 72 (26 Dec 2023 16:00) (59 - 83)  BP: 165/70 (26 Dec 2023 16:00) (133/62 - 166/70)  BP(mean): 101 (26 Dec 2023 16:00) (89 - 121)  RR: 17 (26 Dec 2023 16:00) (17 - 57)  SpO2: 98% (26 Dec 2023 16:00) (93% - 100%)    Parameters below as of 26 Dec 2023 16:00  Patient On (Oxygen Delivery Method): room air    abd: benign, wound manager in place, no leakage currently  (Limited exam)    UO this shift 650 ml;  2,080 ml prior 24 hrs until 7 AM  wound manager:   650 ml/now;  1,175 ml /last 24 hr  RLQ drain:  50 ml/last 24 hrs  RUQ per choley drain:  1050 ml  TPN                          8.2    7.73  )-----------( 214      ( 26 Dec 2023 05:30 )             26.1   12-26    135  |  101  |  44<H>  ----------------------------<  139<H>  4.3   |  26  |  1.53<H>    Ca    8.6      26 Dec 2023 05:30  Phos  3.7     12-26  Mg     2.2     12-26    TPro  8.2  /  Alb  2.2<L>  /  TBili  6.6<H>  /  DBili  4.2<H>  /  AST  118<H>  /  ALT  77<H>  /  AlkPhos  126<H>  12-26

## 2023-12-26 NOTE — PROGRESS NOTE ADULT - ASSESSMENT
78M w PMH Crohn's, AFib/Flutter s/p DCCVs on amiodarone, remote ileocectomy and open appendectomy. Admitted (6/23) for SBO vs Crohns flare, s/p NGT decompression and s/p lap converted to open TRE, SBR x 3, left in discontinuity with abthera vac on (6/27), RTOR for ileocolic resection, small bowel anastomosis, and abdominal wall closure on (6/28), c/b fluid collection s/p IR aspiration of perihepatic fluid on (7/3), c/b wound dehiscence s/p RTOR exlap, washout, ileocolic resection with end ileostomy, blow hole colostomy, red rubber from ileostomy to small bowel anastomosis; vicryl bridging mesh on (7/5) transferred to SICU postoperatively for hemodynamic monitoring, with hospital course complicated by periods of severe ELVI and hyponatremia, which resolved but stepped back up for to SICU on (9/10) for acute AMS, intermittent hypoglycemia, AFib with RVR. Percutaneous Cholecystostomy placed on (9/11) for enlarged GB, PCT capped 10/5 and uncapped 10/25 for hyperbilirubinemia, Right brachial DVT, left basillic and cephalic superficial thrombus on duplex 11/2, CT 11/14 with ALDEN ground glass opacity of unclear etiology, completed empiric 7day cefepime course 11/22, rising T-bili of unclear etilogy, now w resolved candida glabrata fungemia, ELVI resolving.    S/p 1 PRBC over weekend, Hb stable 8.2, otherwise no events     Trend Cr, f/u Nephrology w/ addl recs   cw diet as tolerated   f/u hepatology recommendations   Wound care as per ostomy nurses   Continue to watch hydration status and repleting/restricting  continue with PT   rest of care per SICU, appreciate excellent care by SICU team

## 2023-12-26 NOTE — PROGRESS NOTE ADULT - ATTENDING COMMENTS
UC, AF, s/p sbr, ileocolic resection, ileostomy, enteroatmospheric fistula, anemia  physical as above  LFTs are stable with bili now 6.6  renal stable  continue TPN  continue metoprolol

## 2023-12-27 NOTE — CHART NOTE - NSCHARTNOTEFT_GEN_A_CORE
Brief Nutrition Note:    Chart reviewed, discussed with team. Calcitriol level rechecked 12/22- noted to be 6.1 from 8.4 [11/28]. Pt has been receiving 50,000 units ergocalciferol weekly however likely not absorbing. Discussed with IDT- to trial sublingual administration of ergocalciferol. Recommend to recheck levels in 30 days. Will continue close monitoring & adjust recs prn.    Bella Leung, MS, RDN, CDN [ext. [phone]] Brief Nutrition Note:    Chart reviewed, discussed with team. Calcitriol level rechecked 12/22- noted to be 6.1 from 8.4 [11/28]. Pt has been receiving 50,000 units ergocalciferol weekly however likely not absorbing. Discussed with IDT- to trial sublingual administration of ergocalciferol. Recommend to recheck levels in 30 days. Will continue close monitoring & adjust recs prn.    Bella Leung, MS, RDN, CDN [ext. [phone]]

## 2023-12-27 NOTE — ADVANCED PRACTICE NURSE CONSULT - ASSESSMENT
Peristomal skin pinpoint site on inferior aspect of donny-fistula skin draining sanguinous exudate, resolved with application of Surgicel. Somatized fistula functioning with thick yellow effluent and food particles. Donny-fistula skin with scattered areas of denuded skin.   WOCN cleansed peristomal skin  with normal saline, applied stoma powder, sealed with skin prep, applied a Jayleen one piece, soft convex pouching system, secured edges with sections of Comfeel dressing.    Left Surgicel in place on area of bleeding, covered with ABD dressing and secured with paper tape.  Peristomal skin pinpoint site on inferior aspect of donny-fistula skin draining sanguinous exudate, resolved with application of Surgicel. Somatized fistula functioning with thick yellow effluent and food particles. Donny-fistula skin with scattered areas of denuded skin.   WOCN cleansed peristomal skin  with normal saline, applied stoma powder, sealed with skin prep, applied an ostomy ring, then a [place] one piece, soft convex pouching system, secured edges with sections of Comfeel dressing.   Surgicel left in place on area of bleeding, covered with ABD dressing and secured with paper tape.  Peristomal skin pinpoint site on inferior aspect of donny-fistula skin draining sanguinous exudate, resolved with application of Surgicel. Somatized fistula functioning with thick yellow effluent and food particles. Donny-fistula skin with scattered areas of denuded skin.   WOCN cleansed peristomal skin  with normal saline, applied stoma powder, sealed with skin prep, applied an ostomy ring, then a [place] one piece, soft convex pouching system, secured edges with sections of Comfeel dressing.   Surgicel left in place on area of bleeding, covered with ABD dressing and secured with paper tape.  Peristomal skin pinpoint site on inferior aspect of donny-fistula skin draining sanguinous exudate, resolved with application of Surgicel. Somatized fistula functioning with thick yellow effluent and food particles. Donny-fistula skin with scattered areas of denuded skin.   WOCN cleansed peristomal skin  with normal saline, applied stoma powder, sealed with skin prep, applied an ostomy ring around the fistula, then a Jayleen one piece, soft convex, cut to fit pouching system, secured edges with sections of Comfeel dressing.   Surgicel left in place on area of bleeding, covered with ABD dressing and secured with paper tape.  Peristomal skin pinpoint site on inferior aspect of donny-fistula skin draining sanguinous exudate, resolved with application of Surgicel. 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 Donny-fistula skin with scattered areas of denuded skin.   WOCN cleansed peristomal skin  with normal saline, applied stoma powder, sealed with skin prep, applied an ostomy ring around the fistula, then a Jayleen one piece, soft convex, cut to fit pouching system, secured edges with sections of Comfeel dressing.   Surgicel left in place on area of bleeding, covered with ABD dressing and secured with paper tape.

## 2023-12-27 NOTE — PROGRESS NOTE ADULT - SUBJECTIVE AND OBJECTIVE BOX
S: bleeding from wound bed overnight.     O: ICU Vital Signs Last 24 Hrs  T(F): 98.2 (12-26-23 @ 22:00), Max: 98.2 (12-26-23 @ 22:00)  HR: 80 (12-27-23 @ 10:00) (71 - 85)  BP: 132/60 (12-27-23 @ 10:00) (107/54 - 166/70)  BP(mean): 87 (12-27-23 @ 10:00) (75 - 103)  ABP: --  RR: 22 (12-27-23 @ 10:00) (17 - 45)  SpO2: 97% (12-27-23 @ 10:00) (94% - 100%)    PHYSICAL EXAM:   Neurological: AAOx3, CNII-XII intact,  strength 5/5 b/l  ENT: mucus membrane moist  Cardiovascular: RRR  Respiratory: CTA  Gastrointestinal: soft, NT, ND, BS+, fistula thickish yellowish formed output, has punctate bleeding from wound bed at around 7o'clock area and minimal amount of oozing blood from raw surface. perc cony bilious,  Extremities: warm, no dependent edema  Vascular: no cyanosis/erythema  Skin: no rashes  MSK: no joint swelling.      LABS:    12-26    135  |  101  |  44<H>  ----------------------------<  139<H>  4.3   |  26  |  1.53<H>    Ca    8.6      26 Dec 2023 05:30  Phos  3.7     12-26  Mg     2.2     12-26    TPro  8.2  /  Alb  2.2<L>  /  TBili  6.6<H>  /  DBili  4.2<H>  /  AST  118<H>  /  ALT  77<H>  /  AlkPhos  126<H>  12-26  LIVER FUNCTIONS - ( 26 Dec 2023 05:30 )  Alb: 2.2 g/dL / Pro: 8.2 g/dL / ALK PHOS: 126 U/L / ALT: 77 U/L / AST: 118 U/L / GGT: x                               8.2    7.73  )-----------( 214      ( 26 Dec 2023 05:30 )             26.1     Urinalysis Basic - ( 26 Dec 2023 05:30 )    Color: x / Appearance: x / SG: x / pH: x  Gluc: 139 mg/dL / Ketone: x  / Bili: x / Urobili: x   Blood: x / Protein: x / Nitrite: x   Leuk Esterase: x / RBC: x / WBC x   Sq Epi: x / Non Sq Epi: x / Bacteria: x    CAPILLARY BLOOD GLUCOSE        MEDICATIONS  (STANDING):  chlorhexidine 2% Cloths 1 Application(s) Topical <User Schedule>  cholestyramine Powder (Sugar-Free) 4 Gram(s) Oral daily  epoetin matt-epbx (RETACRIT) Injectable 30939 Unit(s) SubCutaneous every 7 days  ergocalciferol 24792 Unit(s) Oral <User Schedule>  glucagon  Injectable 1 milliGRAM(s) IntraMuscular once  heparin   Injectable 5000 Unit(s) SubCutaneous every 8 hours  labetalol Injectable 10 milliGRAM(s) IV Push every 6 hours  levothyroxine Injectable 30 MICROGram(s) IV Push at bedtime  lipid, fat emulsion (Fish Oil and Plant Based) 20% Infusion 0.54 Gm/kG/Day (20.83 mL/Hr) IV Continuous <Continuous>  lipid, fat emulsion (Fish Oil and Plant Based) 20% Infusion 0.54 Gm/kG/Day (20.83 mL/Hr) IV Continuous <Continuous>  Parenteral Nutrition - Adult 1 Each TPN Continuous <Continuous>  Parenteral Nutrition - Adult 1 Each TPN Continuous <Continuous>  silver nitrate Applicator 1 Application(s) Topical once  ursodiol Suspension 300 milliGRAM(s) Oral every 8 hours  venlafaxine XR. 150 milliGRAM(s) Oral daily    MEDICATIONS  (PRN):  hydrALAZINE Injectable 10 milliGRAM(s) IV Push every 6 hours PRN SBP >170  lidocaine 2% Viscous 10 milliLiter(s) Swish and Spit every 12 hours PRN tongue pain  LORazepam   Injectable 1 milliGRAM(s) IV Push <User Schedule> PRN Anxiety  ondansetron Injectable 4 milliGRAM(s) IV Push every 6 hours PRN Nausea and/or Vomiting      Poole:	  [x ] None	[ ] Daily Poole Order Placed	   Indication:	  [ ] Strict I and O's    [ ] Obstruction     [ ] Incontinence + Stage 3 or 4 Decubitus  Central Line:  [ ] None	   [ x]  Medication / TPN Administration     [ ] No Peripheral IV        S: bleeding from wound bed overnight.     O: ICU Vital Signs Last 24 Hrs  T(F): 98.2 (12-26-23 @ 22:00), Max: 98.2 (12-26-23 @ 22:00)  HR: 80 (12-27-23 @ 10:00) (71 - 85)  BP: 132/60 (12-27-23 @ 10:00) (107/54 - 166/70)  BP(mean): 87 (12-27-23 @ 10:00) (75 - 103)  ABP: --  RR: 22 (12-27-23 @ 10:00) (17 - 45)  SpO2: 97% (12-27-23 @ 10:00) (94% - 100%)    PHYSICAL EXAM:   Neurological: AAOx3, CNII-XII intact,  strength 5/5 b/l  ENT: mucus membrane moist  Cardiovascular: RRR  Respiratory: CTA  Gastrointestinal: soft, NT, ND, BS+, fistula thickish yellowish formed output, has punctate bleeding from wound bed at around 7o'clock area and minimal amount of oozing blood from raw surface. perc cony bilious,  Extremities: warm, no dependent edema  Vascular: no cyanosis/erythema  Skin: no rashes  MSK: no joint swelling.      LABS:    12-26    135  |  101  |  44<H>  ----------------------------<  139<H>  4.3   |  26  |  1.53<H>    Ca    8.6      26 Dec 2023 05:30  Phos  3.7     12-26  Mg     2.2     12-26    TPro  8.2  /  Alb  2.2<L>  /  TBili  6.6<H>  /  DBili  4.2<H>  /  AST  118<H>  /  ALT  77<H>  /  AlkPhos  126<H>  12-26  LIVER FUNCTIONS - ( 26 Dec 2023 05:30 )  Alb: 2.2 g/dL / Pro: 8.2 g/dL / ALK PHOS: 126 U/L / ALT: 77 U/L / AST: 118 U/L / GGT: x                               8.2    7.73  )-----------( 214      ( 26 Dec 2023 05:30 )             26.1     Urinalysis Basic - ( 26 Dec 2023 05:30 )    Color: x / Appearance: x / SG: x / pH: x  Gluc: 139 mg/dL / Ketone: x  / Bili: x / Urobili: x   Blood: x / Protein: x / Nitrite: x   Leuk Esterase: x / RBC: x / WBC x   Sq Epi: x / Non Sq Epi: x / Bacteria: x    CAPILLARY BLOOD GLUCOSE        MEDICATIONS  (STANDING):  chlorhexidine 2% Cloths 1 Application(s) Topical <User Schedule>  cholestyramine Powder (Sugar-Free) 4 Gram(s) Oral daily  epoetin matt-epbx (RETACRIT) Injectable 26738 Unit(s) SubCutaneous every 7 days  ergocalciferol 07612 Unit(s) Oral <User Schedule>  glucagon  Injectable 1 milliGRAM(s) IntraMuscular once  heparin   Injectable 5000 Unit(s) SubCutaneous every 8 hours  labetalol Injectable 10 milliGRAM(s) IV Push every 6 hours  levothyroxine Injectable 30 MICROGram(s) IV Push at bedtime  lipid, fat emulsion (Fish Oil and Plant Based) 20% Infusion 0.54 Gm/kG/Day (20.83 mL/Hr) IV Continuous <Continuous>  lipid, fat emulsion (Fish Oil and Plant Based) 20% Infusion 0.54 Gm/kG/Day (20.83 mL/Hr) IV Continuous <Continuous>  Parenteral Nutrition - Adult 1 Each TPN Continuous <Continuous>  Parenteral Nutrition - Adult 1 Each TPN Continuous <Continuous>  silver nitrate Applicator 1 Application(s) Topical once  ursodiol Suspension 300 milliGRAM(s) Oral every 8 hours  venlafaxine XR. 150 milliGRAM(s) Oral daily    MEDICATIONS  (PRN):  hydrALAZINE Injectable 10 milliGRAM(s) IV Push every 6 hours PRN SBP >170  lidocaine 2% Viscous 10 milliLiter(s) Swish and Spit every 12 hours PRN tongue pain  LORazepam   Injectable 1 milliGRAM(s) IV Push <User Schedule> PRN Anxiety  ondansetron Injectable 4 milliGRAM(s) IV Push every 6 hours PRN Nausea and/or Vomiting      Poole:	  [x ] None	[ ] Daily Poole Order Placed	   Indication:	  [ ] Strict I and O's    [ ] Obstruction     [ ] Incontinence + Stage 3 or 4 Decubitus  Central Line:  [ ] None	   [ x]  Medication / TPN Administration     [ ] No Peripheral IV

## 2023-12-27 NOTE — PROGRESS NOTE ADULT - ASSESSMENT
A/P  Wound manager issues and bleeding and leakage from wound are big issue this AM.  WOCN team diligently working on the problem.  No active bleeding presently.  Fistaal management remains challenging.  Outputs as above. Requiring intermittent IV fluid boluses due to losses.  BUN 44 / Creatinine 1.53  Ambulating daily  As before

## 2023-12-27 NOTE — PROGRESS NOTE ADULT - NS ATTEND AMEND GEN_ALL_CORE FT
UC, AF, s/p SBR, ileocolic resection, ileostomy  physical as above  still with some bleeding at pinpoint site treated with surgicel  cotninue TPN  ativan for sleep  continue metoprolol  vitamin D change to sublingual for perhaps better absorption

## 2023-12-27 NOTE — PROGRESS NOTE ADULT - ASSESSMENT
78M w PMH Crohn's, AFib/Flutter s/p DCCVs on amiodarone, remote ileocectomy and open appendectomy. Admitted (6/23) for SBO vs Crohns flare, s/p NGT decompression and s/p lap converted to open TRE, SBR x 3, left in discontinuity with abthera vac on (6/27), RTOR for ileocolic resection, small bowel anastomosis, and abdominal wall closure on (6/28), c/b fluid collection s/p IR aspiration of perihepatic fluid on (7/3), c/b wound dehiscence s/p RTOR exlap, washout, ileocolic resection with end ileostomy, blow hole colostomy, red rubber from ileostomy to small bowel anastomosis; vicryl bridging mesh on (7/5) transferred to SICU postoperatively for hemodynamic monitoring, with hospital course complicated by periods of severe ELVI and hyponatremia, which resolved but stepped back up for to SICU on (9/10) for acute AMS, intermittent hypoglycemia, AFib with RVR. Percutaneous Cholecystostomy placed on (9/11) for enlarged GB, PCT capped 10/5 and uncapped 10/25 for hyperbilirubinemia, Right brachial DVT, left basillic and cephalic superficial thrombus on duplex 11/2, CT 11/14 with ALDEN ground glass opacity of unclear etiology, completed empiric 7day cefepime course 11/22, rising T-bili of unclear etilogy, now w resolved candida glabrata fungemia, ELVI improving.     NEURO: Hx of depression: cont Effexor (halved dose in setting of renal dysfunx). Anxiety: Lorazepam PRN. Holistic consult for anxiety and insomnia.   CV: Hx Afib/flutter: s/p DCCV, off Amio and lipitor due to persistent transaminitis, continue labetalol IV 10q6h. TTE  (7/18) - PASP 64mmHg, EF 65-70%. holding Losartan & norvasc because not absorbing PO meds, hydralazine PRN.   PULM: Atelectasis/dyspnea improved clinically: cont 1 hr of BiPAP every 12hrs of nasal canula or room air. Encourage OOB and IS.  CT from 11/14 with ALDEN ground glass opacity of unclear etiology. COVID negative. RVP negative. completed 7d empiric cefepime 11/22 to cover for potential PNA.   GI/FEN: Low res, low fat, high protein diet. Cont Ensure max x1/day. Folate, thiamine. PICC replaced 12/4, will switch out PICC on 1/4. continue TPN/lipids. High output EC fistula: cont Octreotide & Cholestyramine 10/18. Transaminitis elevated T bili of unclear origin, s/p Perc Deb on 9/11, failed capping trial; seen by Hepatology - MRCP 10/30 no obstruction, cont ursodiol, RUQ US 11/25 CBD 5mm, hepatic vessels patent  : Voids. ELVI improved: stopped famotidine given renal dysfunction. MARLO Duplex and RP US unremarkable, CK wnl.    ENDO: Hx hypothyroid: IV Synthroid, TSH low on 11/30, decreased synthroid dose, repeat TSH 12/6 wnl.  ID: currently not on any abx/antifungals. BCx 11/22 grew candida glabrata, likely CLABSI. s/p Caspo & completed Fluconazole course (12/1-12/9). Ophthalmology evaluated - normal ocular exam. Echo no vegetation. PICC replaced on 12/4. Cont Nystatin powder. // DC: caspofungin (11/24-12/1). empiric 7days cefepime (11/15-11/22), C. tertium, Lactobacillis from IR cx 7/3, and candida albicans, lactobacillus, vanc sensitive E faecium, vanc resistant E gallinarum, vanc resistant E casseliflavus, lactobacillus from OR cx 7/5. Completed course of abx with Imipenem (9/10-9/15). Imipenem (8/26--) imipenem (6/30-7/12, 7/23-7/30), Dapto (6/30-7/5 and 7/23-7/24). Empiric dapto (8/23-24) and cefepime (8/23-24).   PPX: SCDs, SQH, was on Therapeutic lovenox bid for R brachial vein DVT, but will hold off on anticoagulation since repeat US Duplex negative.  HEME: Anemia - continue Epogen weekly. S/p Iron Sucrose 200 qd x 3 days for chronic anemia.  LINES: PIVs, LUE PICC (12/4 - ) will plan to switch PICC once a month (around 1/4) // DC: MERONE PICC (9/1-11/1 ), RUE PICC: (11/1-11/24)   WOUNDS/DRAINS: Abdominal wound and fistula with wound manager in place dressing change Tuesday and Friday, has punctate bleeding at wound bed, placed surgicell over. RLQ Ostomy. IR perc deb tube. // DC: L Clay drain.  PT: Ambulate as tolerated OOB to chair daily.  DISPO: SICU due to complicated hospital course.     78M w PMH Crohn's, AFib/Flutter s/p DCCVs on amiodarone, remote ileocectomy and open appendectomy. Admitted (6/23) for SBO vs Crohns flare, s/p NGT decompression and s/p lap converted to open TRE, SBR x 3, left in discontinuity with abthera vac on (6/27), RTOR for ileocolic resection, small bowel anastomosis, and abdominal wall closure on (6/28), c/b fluid collection s/p IR aspiration of perihepatic fluid on (7/3), c/b wound dehiscence s/p RTOR exlap, washout, ileocolic resection with end ileostomy, blow hole colostomy, red rubber from ileostomy to small bowel anastomosis; vicryl bridging mesh on (7/5) transferred to SICU postoperatively for hemodynamic monitoring, with hospital course complicated by periods of severe ELVI and hyponatremia, which resolved but stepped back up for to SICU on (9/10) for acute AMS, intermittent hypoglycemia, AFib with RVR. Percutaneous Cholecystostomy placed on (9/11) for enlarged GB, PCT capped 10/5 and uncapped 10/25 for hyperbilirubinemia, Right brachial DVT, left basillic and cephalic superficial thrombus on duplex 11/2, CT 11/14 with ALDEN ground glass opacity of unclear etiology, completed empiric 7day cefepime course 11/22, rising T-bili of unclear etilogy, now w resolved candida glabrata fungemia, ELVI improving.     NEURO: Hx of depression: cont Effexor (halved dose in setting of renal dysfunx). Anxiety: Lorazepam PRN. Holistic consult for anxiety and insomnia.   CV: Hx Afib/flutter: s/p DCCV, off Amio and lipitor due to persistent transaminitis, continue labetalol IV 10q6h. TTE  (7/18) - PASP 64mmHg, EF 65-70%. holding Losartan & norvasc because not absorbing PO meds, hydralazine PRN.   PULM: Atelectasis/dyspnea improved clinically: cont 1 hr of BiPAP every 12hrs of nasal canula or room air. Encourage OOB and IS.  CT from 11/14 with ALDEN ground glass opacity of unclear etiology. COVID negative. RVP negative. completed 7d empiric cefepime 11/22 to cover for potential PNA.   GI/FEN: Low res, low fat, high protein diet. Cont Ensure max x1/day. Folate, thiamine. PICC replaced 12/4, will switch out PICC on 1/4. continue TPN/lipids. High output EC fistula: cont Octreotide & Cholestyramine 10/18. Transaminitis elevated T bili of unclear origin, s/p Perc Deb on 9/11, failed capping trial; seen by Hepatology - MRCP 10/30 no obstruction, cont ursodiol, RUQ US 11/25 CBD 5mm, hepatic vessels patent  : Voids. ELVI improved: stopped famotidine given renal dysfunction. MAROL Duplex and RP US unremarkable, CK wnl.    ENDO: Hx hypothyroid: IV Synthroid, TSH low on 11/30, decreased synthroid dose, repeat TSH 12/6 wnl.  ID: currently not on any abx/antifungals. BCx 11/22 grew candida glabrata, likely CLABSI. s/p Caspo & completed Fluconazole course (12/1-12/9). Ophthalmology evaluated - normal ocular exam. Echo no vegetation. PICC replaced on 12/4. Cont Nystatin powder. // DC: caspofungin (11/24-12/1). empiric 7days cefepime (11/15-11/22), C. tertium, Lactobacillis from IR cx 7/3, and candida albicans, lactobacillus, vanc sensitive E faecium, vanc resistant E gallinarum, vanc resistant E casseliflavus, lactobacillus from OR cx 7/5. Completed course of abx with Imipenem (9/10-9/15). Imipenem (8/26--) imipenem (6/30-7/12, 7/23-7/30), Dapto (6/30-7/5 and 7/23-7/24). Empiric dapto (8/23-24) and cefepime (8/23-24).   PPX: SCDs, SQH, was on Therapeutic lovenox bid for R brachial vein DVT, but will hold off on anticoagulation since repeat US Duplex negative.  HEME: Anemia - continue Epogen weekly. S/p Iron Sucrose 200 qd x 3 days for chronic anemia.  LINES: PIVs, LUE PICC (12/4 - ) will plan to switch PICC once a month (around 1/4) // DC: MERONE PICC (9/1-11/1 ), RUE PICC: (11/1-11/24)   WOUNDS/DRAINS: Abdominal wound and fistula with wound manager in place dressing change Tuesday and Friday, has punctate bleeding at wound bed, placed surgicell over. RLQ Ostomy. IR perc deb tube. // DC: L Clay drain.  PT: Ambulate as tolerated OOB to chair daily.  DISPO: SICU due to complicated hospital course.

## 2023-12-27 NOTE — PROGRESS NOTE ADULT - SUBJECTIVE AND OBJECTIVE BOX
SUBJECTIVE:  No acute events overnight -   1L LR received yesterday for being net negative 900    Slept well overnight, no acute complaints   Remains HDS, afebrile     MEDICATIONS  (STANDING):  chlorhexidine 2% Cloths 1 Application(s) Topical <User Schedule>  cholestyramine Powder (Sugar-Free) 4 Gram(s) Oral daily  epoetin matt-epbx (RETACRIT) Injectable 00914 Unit(s) SubCutaneous every 7 days  ergocalciferol 80395 Unit(s) Oral <User Schedule>  glucagon  Injectable 1 milliGRAM(s) IntraMuscular once  heparin   Injectable 5000 Unit(s) SubCutaneous every 8 hours  labetalol Injectable 10 milliGRAM(s) IV Push every 6 hours  levothyroxine Injectable 30 MICROGram(s) IV Push at bedtime  lipid, fat emulsion (Fish Oil and Plant Based) 20% Infusion 0.54 Gm/kG/Day (20.83 mL/Hr) IV Continuous <Continuous>  lipid, fat emulsion (Fish Oil and Plant Based) 20% Infusion 0.54 Gm/kG/Day (20.83 mL/Hr) IV Continuous <Continuous>  Parenteral Nutrition - Adult 1 Each TPN Continuous <Continuous>  Parenteral Nutrition - Adult 1 Each TPN Continuous <Continuous>  silver nitrate Applicator 1 Application(s) Topical once  ursodiol Suspension 300 milliGRAM(s) Oral every 8 hours  venlafaxine XR. 150 milliGRAM(s) Oral daily    MEDICATIONS  (PRN):  hydrALAZINE Injectable 10 milliGRAM(s) IV Push every 6 hours PRN SBP >170  lidocaine 2% Viscous 10 milliLiter(s) Swish and Spit every 12 hours PRN tongue pain  LORazepam   Injectable 1 milliGRAM(s) IV Push <User Schedule> PRN Anxiety  ondansetron Injectable 4 milliGRAM(s) IV Push every 6 hours PRN Nausea and/or Vomiting      Vital Signs Last 24 Hrs  T(C): 36.8 (26 Dec 2023 22:00), Max: 36.8 (26 Dec 2023 22:00)  T(F): 98.2 (26 Dec 2023 22:00), Max: 98.2 (26 Dec 2023 22:00)  HR: 81 (27 Dec 2023 09:00) (71 - 85)  BP: 117/56 (27 Dec 2023 09:00) (107/54 - 166/70)  BP(mean): 80 (27 Dec 2023 09:00) (75 - 103)  RR: 27 (27 Dec 2023 09:00) (17 - 45)  SpO2: 98% (27 Dec 2023 09:00) (94% - 100%)    Parameters below as of 27 Dec 2023 09:00  Patient On (Oxygen Delivery Method): room air    Physical Exam:  Gen: NAD  ENT: mucus membrane moist  Cardiovascular: RRR  Respiratory: CTA  Gastrointestinal: soft, NT, ND, BS+, fistula thickish yellowish formed output, wound manager in place. perc cony bilious.  Extremities: warm, no dependent edema  Neurological: AAOx3, CNII-XII intact,  strength 5/5 b/lI&O's Summary    26 Dec 2023 07:01  -  27 Dec 2023 07:00  --------------------------------------------------------  IN: 3902.8 mL / OUT: 4325 mL / NET: -422.2 mL    27 Dec 2023 07:01  -  27 Dec 2023 10:34  --------------------------------------------------------  IN: 138 mL / OUT: 0 mL / NET: 138 mL        LABS:                        8.2    7.73  )-----------( 214      ( 26 Dec 2023 05:30 )             26.1     12-26    135  |  101  |  44<H>  ----------------------------<  139<H>  4.3   |  26  |  1.53<H>    Ca    8.6      26 Dec 2023 05:30  Phos  3.7     12-26  Mg     2.2     12-26    TPro  8.2  /  Alb  2.2<L>  /  TBili  6.6<H>  /  DBili  4.2<H>  /  AST  118<H>  /  ALT  77<H>  /  AlkPhos  126<H>  12-26      Urinalysis Basic - ( 26 Dec 2023 05:30 )    Color: x / Appearance: x / SG: x / pH: x  Gluc: 139 mg/dL / Ketone: x  / Bili: x / Urobili: x   Blood: x / Protein: x / Nitrite: x   Leuk Esterase: x / RBC: x / WBC x   Sq Epi: x / Non Sq Epi: x / Bacteria: x      CAPILLARY BLOOD GLUCOSE        LIVER FUNCTIONS - ( 26 Dec 2023 05:30 )  Alb: 2.2 g/dL / Pro: 8.2 g/dL / ALK PHOS: 126 U/L / ALT: 77 U/L / AST: 118 U/L / GGT: x             RADIOLOGY & ADDITIONAL STUDIES:   SUBJECTIVE:  No acute events overnight -   1L LR received yesterday for being net negative 900    Slept well overnight, no acute complaints   Remains HDS, afebrile     MEDICATIONS  (STANDING):  chlorhexidine 2% Cloths 1 Application(s) Topical <User Schedule>  cholestyramine Powder (Sugar-Free) 4 Gram(s) Oral daily  epoetin matt-epbx (RETACRIT) Injectable 61965 Unit(s) SubCutaneous every 7 days  ergocalciferol 75538 Unit(s) Oral <User Schedule>  glucagon  Injectable 1 milliGRAM(s) IntraMuscular once  heparin   Injectable 5000 Unit(s) SubCutaneous every 8 hours  labetalol Injectable 10 milliGRAM(s) IV Push every 6 hours  levothyroxine Injectable 30 MICROGram(s) IV Push at bedtime  lipid, fat emulsion (Fish Oil and Plant Based) 20% Infusion 0.54 Gm/kG/Day (20.83 mL/Hr) IV Continuous <Continuous>  lipid, fat emulsion (Fish Oil and Plant Based) 20% Infusion 0.54 Gm/kG/Day (20.83 mL/Hr) IV Continuous <Continuous>  Parenteral Nutrition - Adult 1 Each TPN Continuous <Continuous>  Parenteral Nutrition - Adult 1 Each TPN Continuous <Continuous>  silver nitrate Applicator 1 Application(s) Topical once  ursodiol Suspension 300 milliGRAM(s) Oral every 8 hours  venlafaxine XR. 150 milliGRAM(s) Oral daily    MEDICATIONS  (PRN):  hydrALAZINE Injectable 10 milliGRAM(s) IV Push every 6 hours PRN SBP >170  lidocaine 2% Viscous 10 milliLiter(s) Swish and Spit every 12 hours PRN tongue pain  LORazepam   Injectable 1 milliGRAM(s) IV Push <User Schedule> PRN Anxiety  ondansetron Injectable 4 milliGRAM(s) IV Push every 6 hours PRN Nausea and/or Vomiting      Vital Signs Last 24 Hrs  T(C): 36.8 (26 Dec 2023 22:00), Max: 36.8 (26 Dec 2023 22:00)  T(F): 98.2 (26 Dec 2023 22:00), Max: 98.2 (26 Dec 2023 22:00)  HR: 81 (27 Dec 2023 09:00) (71 - 85)  BP: 117/56 (27 Dec 2023 09:00) (107/54 - 166/70)  BP(mean): 80 (27 Dec 2023 09:00) (75 - 103)  RR: 27 (27 Dec 2023 09:00) (17 - 45)  SpO2: 98% (27 Dec 2023 09:00) (94% - 100%)    Parameters below as of 27 Dec 2023 09:00  Patient On (Oxygen Delivery Method): room air    Physical Exam:  Gen: NAD  ENT: mucus membrane moist  Cardiovascular: RRR  Respiratory: CTA  Gastrointestinal: soft, NT, ND, BS+, fistula thickish yellowish formed output, wound manager in place. perc cony bilious.  Extremities: warm, no dependent edema  Neurological: AAOx3, CNII-XII intact,  strength 5/5 b/lI&O's Summary    26 Dec 2023 07:01  -  27 Dec 2023 07:00  --------------------------------------------------------  IN: 3902.8 mL / OUT: 4325 mL / NET: -422.2 mL    27 Dec 2023 07:01  -  27 Dec 2023 10:34  --------------------------------------------------------  IN: 138 mL / OUT: 0 mL / NET: 138 mL        LABS:                        8.2    7.73  )-----------( 214      ( 26 Dec 2023 05:30 )             26.1     12-26    135  |  101  |  44<H>  ----------------------------<  139<H>  4.3   |  26  |  1.53<H>    Ca    8.6      26 Dec 2023 05:30  Phos  3.7     12-26  Mg     2.2     12-26    TPro  8.2  /  Alb  2.2<L>  /  TBili  6.6<H>  /  DBili  4.2<H>  /  AST  118<H>  /  ALT  77<H>  /  AlkPhos  126<H>  12-26      Urinalysis Basic - ( 26 Dec 2023 05:30 )    Color: x / Appearance: x / SG: x / pH: x  Gluc: 139 mg/dL / Ketone: x  / Bili: x / Urobili: x   Blood: x / Protein: x / Nitrite: x   Leuk Esterase: x / RBC: x / WBC x   Sq Epi: x / Non Sq Epi: x / Bacteria: x      CAPILLARY BLOOD GLUCOSE        LIVER FUNCTIONS - ( 26 Dec 2023 05:30 )  Alb: 2.2 g/dL / Pro: 8.2 g/dL / ALK PHOS: 126 U/L / ALT: 77 U/L / AST: 118 U/L / GGT: x             RADIOLOGY & ADDITIONAL STUDIES:

## 2023-12-28 NOTE — PROGRESS NOTE ADULT - ASSESSMENT
78M w PMH Crohn's, AFib/Flutter s/p DCCVs on amiodarone, remote ileocectomy and open appendectomy. Admitted (6/23) for SBO vs Crohns flare, s/p NGT decompression and s/p lap converted to open TRE, SBR x 3, left in discontinuity with abthera vac on (6/27), RTOR for ileocolic resection, small bowel anastomosis, and abdominal wall closure on (6/28), c/b fluid collection s/p IR aspiration of perihepatic fluid on (7/3), c/b wound dehiscence s/p RTOR exlap, washout, ileocolic resection with end ileostomy, blow hole colostomy, red rubber from ileostomy to small bowel anastomosis; vicryl bridging mesh on (7/5) transferred to SICU postoperatively for hemodynamic monitoring, with hospital course complicated by periods of severe ELVI and hyponatremia, which resolved but stepped back up for to SICU on (9/10) for acute AMS, intermittent hypoglycemia, AFib with RVR. Percutaneous Cholecystostomy placed on (9/11) for enlarged GB, PCT capped 10/5 and uncapped 10/25 for hyperbilirubinemia, Right brachial DVT, left basillic and cephalic superficial thrombus on duplex 11/2, CT 11/14 with ALDEN ground glass opacity of unclear etiology, completed empiric 7day cefepime course 11/22, rising T-bili of unclear etilogy, now w resolved candida glabrata fungemia, ELVI improving.     NEURO: Hx of depression: cont Effexor (halved dose in setting of renal dysfunx). Anxiety: Lorazepam PRN. Holistic consult for anxiety and insomnia.   CV: Hx Afib/flutter: s/p DCCV, off Amio and lipitor due to persistent transaminitis, continue labetalol IV 10q6h. TTE  (7/18) - PASP 64mmHg, EF 65-70%. holding Losartan & norvasc because not absorbing PO meds, hydralazine PRN.   PULM: Atelectasis/dyspnea improved clinically: cont 1 hr of BiPAP every 12hrs of nasal canula or room air. Encourage OOB and IS.  CT from 11/14 with ALDEN ground glass opacity of unclear etiology. COVID negative. RVP negative. completed 7d empiric cefepime 11/22 to cover for potential PNA.   GI/FEN: Low res, low fat, high protein diet. Cont Ensure max x1/day. Folate, thiamine. PICC replaced 12/4, will switch out PICC on 1/4. continue TPN/lipids. High output EC fistula: cont Octreotide & Cholestyramine 10/18. Transaminitis elevated T bili of unclear origin, s/p Perc Deb on 9/11, failed capping trial; seen by Hepatology - MRCP 10/30 no obstruction, cont ursodiol, RUQ US 11/25 CBD 5mm, hepatic vessels patent  : Voids. ELVI improved: stopped famotidine given renal dysfunction. MARLO Duplex and RP US unremarkable, CK wnl.    ENDO: Hx hypothyroid: IV Synthroid, TSH low on 11/30, decreased synthroid dose, repeat TSH 12/6 wnl.  ID: currently not on any abx/antifungals. BCx 11/22 grew candida glabrata, likely CLABSI. s/p Caspo & completed Fluconazole course (12/1-12/9). Ophthalmology evaluated - normal ocular exam. Echo no vegetation. PICC replaced on 12/4. Cont Nystatin powder. // DC: caspofungin (11/24-12/1). empiric 7days cefepime (11/15-11/22), C. tertium, Lactobacillis from IR cx 7/3, and candida albicans, lactobacillus, vanc sensitive E faecium, vanc resistant E gallinarum, vanc resistant E casseliflavus, lactobacillus from OR cx 7/5. Completed course of abx with Imipenem (9/10-9/15). Imipenem (8/26--) imipenem (6/30-7/12, 7/23-7/30), Dapto (6/30-7/5 and 7/23-7/24). Empiric dapto (8/23-24) and cefepime (8/23-24).   PPX: SCDs, SQH, was on Therapeutic lovenox bid for R brachial vein DVT, but will hold off on anticoagulation since repeat US Duplex negative.  HEME: Anemia - continue Epogen weekly. S/p Iron Sucrose 200 qd x 3 days for chronic anemia.  LINES: PIVs, LUE PICC (12/4 - ) will plan to switch PICC once a month (around 1/4) // DC: LUE PICC (9/1-11/1 ), RUE PICC: (11/1-11/24)   WOUNDS/DRAINS: Abdominal wound and fistula with wound manager in place dressing change Tuesday and Friday, had punctate bleeding at wound bed, s/p placed surgicell over - not bleeding at this time. RLQ Ostomy. IR perc deb tube. // DC: L Clay drain.  PT: Ambulate as tolerated OOB to chair daily.  DISPO: SICU due to complicated hospital course.  78M w PMH Crohn's, AFib/Flutter s/p DCCVs on amiodarone, remote ileocectomy and open appendectomy. Admitted (6/23) for SBO vs Crohns flare, s/p NGT decompression and s/p lap converted to open TRE, SBR x 3, left in discontinuity with abthera vac on (6/27), RTOR for ileocolic resection, small bowel anastomosis, and abdominal wall closure on (6/28), c/b fluid collection s/p IR aspiration of perihepatic fluid on (7/3), c/b wound dehiscence s/p RTOR exlap, washout, ileocolic resection with end ileostomy, blow hole colostomy, red rubber from ileostomy to small bowel anastomosis; vicryl bridging mesh on (7/5) transferred to SICU postoperatively for hemodynamic monitoring, with hospital course complicated by periods of severe ELVI and hyponatremia, which resolved but stepped back up for to SICU on (9/10) for acute AMS, intermittent hypoglycemia, AFib with RVR. Percutaneous Cholecystostomy placed on (9/11) for enlarged GB, PCT capped 10/5 and uncapped 10/25 for hyperbilirubinemia, Right brachial DVT, left basillic and cephalic superficial thrombus on duplex 11/2, CT 11/14 with ALDEN ground glass opacity of unclear etiology, completed empiric 7day cefepime course 11/22, rising T-bili of unclear etilogy, now w resolved candida glabrata fungemia, ELVI improving.     NEURO: Hx of depression: cont Effexor (halved dose in setting of renal dysfunx). Anxiety: Lorazepam PRN. Holistic consult for anxiety and insomnia.   CV: Hx Afib/flutter: s/p DCCV, off Amio and lipitor due to persistent transaminitis, continue labetalol IV 10q6h. TTE  (7/18) - PASP 64mmHg, EF 65-70%. holding Losartan & norvasc because not absorbing PO meds, hydralazine PRN.   PULM: Atelectasis/dyspnea improved clinically: cont 1 hr of BiPAP every 12hrs of nasal canula or room air. Encourage OOB and IS.  CT from 11/14 with ALDEN ground glass opacity of unclear etiology. COVID negative. RVP negative. completed 7d empiric cefepime 11/22 to cover for potential PNA.   GI/FEN: Low res, low fat, high protein diet. Cont Ensure max x1/day. Folate, thiamine. PICC replaced 12/4, will switch out PICC on 1/4. continue TPN/lipids. High output EC fistula: cont Octreotide & Cholestyramine 10/18. Transaminitis elevated T bili of unclear origin, s/p Perc Deb on 9/11, failed capping trial; seen by Hepatology - MRCP 10/30 no obstruction, cont ursodiol, RUQ US 11/25 CBD 5mm, hepatic vessels patent  : Voids. ELVI improved: stopped famotidine given renal dysfunction. MARLO Duplex and RP US unremarkable, CK wnl.    ENDO: Hx hypothyroid: IV Synthroid, TSH low on 11/30, decreased synthroid dose, repeat TSH 12/6 wnl.  ID: currently not on any abx/antifungals. BCx 11/22 grew candida glabrata, likely CLABSI. s/p Caspo & completed Fluconazole course (12/1-12/9). Ophthalmology evaluated - normal ocular exam. Echo no vegetation. PICC replaced on 12/4. Cont Nystatin powder. // DC: caspofungin (11/24-12/1). empiric 7days cefepime (11/15-11/22), C. tertium, Lactobacillis from IR cx 7/3, and candida albicans, lactobacillus, vanc sensitive E faecium, vanc resistant E gallinarum, vanc resistant E casseliflavus, lactobacillus from OR cx 7/5. Completed course of abx with Imipenem (9/10-9/15). Imipenem (8/26--) imipenem (6/30-7/12, 7/23-7/30), Dapto (6/30-7/5 and 7/23-7/24). Empiric dapto (8/23-24) and cefepime (8/23-24).   PPX: SCDs, SQH, was on Therapeutic lovenox bid for R brachial vein DVT, but will hold off on anticoagulation since repeat US Duplex negative.  HEME: Anemia - continue Epogen weekly. S/p Iron Sucrose 200 qd x 3 days for chronic anemia. transfuse 1U PRBC for symptomatic anemia  LINES: PIVs, MERONE PICC (12/4 - ) will plan to switch PICC once a month (around 1/4) // DC: LUE PICC (9/1-11/1 ), RUE PICC: (11/1-11/24)   WOUNDS/DRAINS: Abdominal wound and fistula with wound manager in place dressing change Tuesday and Friday, had punctate bleeding at wound bed, s/p placed surgicell over - not bleeding at this time. RLQ Ostomy. IR perc deb tube. // DC: L Clay drain.  PT: Ambulate as tolerated OOB to chair daily.  DISPO: SICU due to complicated hospital course.

## 2023-12-28 NOTE — PROGRESS NOTE ADULT - SUBJECTIVE AND OBJECTIVE BOX
S: No new issues/events overnight, no new med c/o    O: ICU Vital Signs Last 24 Hrs  T(F): 98.1 (12-28-23 @ 05:40), Max: 98.3 (12-27-23 @ 14:00)  HR: 81 (12-28-23 @ 06:00) (74 - 81)  BP: 141/69 (12-28-23 @ 08:00) (117/56 - 166/70)  BP(mean): 97 (12-28-23 @ 08:00) (80 - 101)  ABP: --  RR: 25 (12-28-23 @ 06:00) (20 - 54)  SpO2: 94% (12-28-23 @ 06:00) (94% - 100%)    PHYSICAL EXAM:   Neurological: AAOx3, CNII-XII intact,  strength 5/5 b/l  ENT: mucus membrane moist  Cardiovascular: RRR  Respiratory: CTA  Gastrointestinal: soft, NT, ND, BS+, fistula thickish yellowish formed output, no bleeding. surgicel with overlying ABD dressing intact. perc cony bilious,  Extremities: warm, no dependent edema  Vascular: no cyanosis/erythema  Skin: no rashes  MSK: no joint swelling.      LABS:    12-28    135  |  102  |  45<H>  ----------------------------<  127<H>  4.3   |  28  |  1.62<H>    Ca    8.6      28 Dec 2023 04:37  Phos  3.5     12-28  Mg     2.2     12-28    TPro  8.3  /  Alb  2.2<L>  /  TBili  6.4<H>  /  DBili  4.1<H>  /  AST  111<H>  /  ALT  72<H>  /  AlkPhos  118  12-28  LIVER FUNCTIONS - ( 28 Dec 2023 04:37 )  Alb: 2.2 g/dL / Pro: 8.3 g/dL / ALK PHOS: 118 U/L / ALT: 72 U/L / AST: 111 U/L / GGT: x                               7.8    7.46  )-----------( 212      ( 28 Dec 2023 04:37 )             24.0     Urinalysis Basic - ( 28 Dec 2023 04:37 )    Color: x / Appearance: x / SG: x / pH: x  Gluc: 127 mg/dL / Ketone: x  / Bili: x / Urobili: x   Blood: x / Protein: x / Nitrite: x   Leuk Esterase: x / RBC: x / WBC x   Sq Epi: x / Non Sq Epi: x / Bacteria: x    CAPILLARY BLOOD GLUCOSE        MEDICATIONS  (STANDING):  chlorhexidine 2% Cloths 1 Application(s) Topical <User Schedule>  cholestyramine Powder (Sugar-Free) 4 Gram(s) Oral daily  epoetin matt-epbx (RETACRIT) Injectable 21285 Unit(s) SubCutaneous every 7 days  ergocalciferol 06741 Unit(s) Oral <User Schedule>  glucagon  Injectable 1 milliGRAM(s) IntraMuscular once  heparin   Injectable 5000 Unit(s) SubCutaneous every 8 hours  labetalol Injectable 10 milliGRAM(s) IV Push every 6 hours  levothyroxine Injectable 30 MICROGram(s) IV Push at bedtime  lipid, fat emulsion (Fish Oil and Plant Based) 20% Infusion 0.54 Gm/kG/Day (20.83 mL/Hr) IV Continuous <Continuous>  lipid, fat emulsion (Fish Oil and Plant Based) 20% Infusion 0.54 Gm/kG/Day (20.83 mL/Hr) IV Continuous <Continuous>  Parenteral Nutrition - Adult 1 Each TPN Continuous <Continuous>  Parenteral Nutrition - Adult 1 Each TPN Continuous <Continuous>  silver nitrate Applicator 1 Application(s) Topical once  ursodiol Suspension 300 milliGRAM(s) Oral every 8 hours  venlafaxine XR. 150 milliGRAM(s) Oral daily    MEDICATIONS  (PRN):  hydrALAZINE Injectable 10 milliGRAM(s) IV Push every 6 hours PRN SBP >170  lidocaine 2% Viscous 10 milliLiter(s) Swish and Spit every 12 hours PRN tongue pain  LORazepam   Injectable 1 milliGRAM(s) IV Push <User Schedule> PRN Anxiety  ondansetron Injectable 4 milliGRAM(s) IV Push every 6 hours PRN Nausea and/or Vomiting      Poole:	  [x ] None	[ ] Daily Poole Order Placed	   Indication:	  [ ] Strict I and O's    [ ] Obstruction     [ ] Incontinence + Stage 3 or 4 Decubitus  Central Line:  [ ] None	   [x ]  Medication / TPN Administration     [ ] No Peripheral IV        S: No new issues/events overnight, no new med c/o    O: ICU Vital Signs Last 24 Hrs  T(F): 98.1 (12-28-23 @ 05:40), Max: 98.3 (12-27-23 @ 14:00)  HR: 81 (12-28-23 @ 06:00) (74 - 81)  BP: 141/69 (12-28-23 @ 08:00) (117/56 - 166/70)  BP(mean): 97 (12-28-23 @ 08:00) (80 - 101)  ABP: --  RR: 25 (12-28-23 @ 06:00) (20 - 54)  SpO2: 94% (12-28-23 @ 06:00) (94% - 100%)    PHYSICAL EXAM:   Neurological: AAOx3, CNII-XII intact,  strength 5/5 b/l  ENT: mucus membrane moist  Cardiovascular: RRR  Respiratory: CTA  Gastrointestinal: soft, NT, ND, BS+, fistula thickish yellowish formed output, no bleeding. surgicel with overlying ABD dressing intact. perc cony bilious,  Extremities: warm, no dependent edema  Vascular: no cyanosis/erythema  Skin: no rashes  MSK: no joint swelling.      LABS:    12-28    135  |  102  |  45<H>  ----------------------------<  127<H>  4.3   |  28  |  1.62<H>    Ca    8.6      28 Dec 2023 04:37  Phos  3.5     12-28  Mg     2.2     12-28    TPro  8.3  /  Alb  2.2<L>  /  TBili  6.4<H>  /  DBili  4.1<H>  /  AST  111<H>  /  ALT  72<H>  /  AlkPhos  118  12-28  LIVER FUNCTIONS - ( 28 Dec 2023 04:37 )  Alb: 2.2 g/dL / Pro: 8.3 g/dL / ALK PHOS: 118 U/L / ALT: 72 U/L / AST: 111 U/L / GGT: x                               7.8    7.46  )-----------( 212      ( 28 Dec 2023 04:37 )             24.0     Urinalysis Basic - ( 28 Dec 2023 04:37 )    Color: x / Appearance: x / SG: x / pH: x  Gluc: 127 mg/dL / Ketone: x  / Bili: x / Urobili: x   Blood: x / Protein: x / Nitrite: x   Leuk Esterase: x / RBC: x / WBC x   Sq Epi: x / Non Sq Epi: x / Bacteria: x    CAPILLARY BLOOD GLUCOSE        MEDICATIONS  (STANDING):  chlorhexidine 2% Cloths 1 Application(s) Topical <User Schedule>  cholestyramine Powder (Sugar-Free) 4 Gram(s) Oral daily  epoetin matt-epbx (RETACRIT) Injectable 05313 Unit(s) SubCutaneous every 7 days  ergocalciferol 16318 Unit(s) Oral <User Schedule>  glucagon  Injectable 1 milliGRAM(s) IntraMuscular once  heparin   Injectable 5000 Unit(s) SubCutaneous every 8 hours  labetalol Injectable 10 milliGRAM(s) IV Push every 6 hours  levothyroxine Injectable 30 MICROGram(s) IV Push at bedtime  lipid, fat emulsion (Fish Oil and Plant Based) 20% Infusion 0.54 Gm/kG/Day (20.83 mL/Hr) IV Continuous <Continuous>  lipid, fat emulsion (Fish Oil and Plant Based) 20% Infusion 0.54 Gm/kG/Day (20.83 mL/Hr) IV Continuous <Continuous>  Parenteral Nutrition - Adult 1 Each TPN Continuous <Continuous>  Parenteral Nutrition - Adult 1 Each TPN Continuous <Continuous>  silver nitrate Applicator 1 Application(s) Topical once  ursodiol Suspension 300 milliGRAM(s) Oral every 8 hours  venlafaxine XR. 150 milliGRAM(s) Oral daily    MEDICATIONS  (PRN):  hydrALAZINE Injectable 10 milliGRAM(s) IV Push every 6 hours PRN SBP >170  lidocaine 2% Viscous 10 milliLiter(s) Swish and Spit every 12 hours PRN tongue pain  LORazepam   Injectable 1 milliGRAM(s) IV Push <User Schedule> PRN Anxiety  ondansetron Injectable 4 milliGRAM(s) IV Push every 6 hours PRN Nausea and/or Vomiting      Poole:	  [x ] None	[ ] Daily Poole Order Placed	   Indication:	  [ ] Strict I and O's    [ ] Obstruction     [ ] Incontinence + Stage 3 or 4 Decubitus  Central Line:  [ ] None	   [x ]  Medication / TPN Administration     [ ] No Peripheral IV        S: pt says he feels "winded" when he walks, thinks that he needs blood transfusion given bleeding from wound yesterday.     O: ICU Vital Signs Last 24 Hrs  T(F): 98.1 (12-28-23 @ 05:40), Max: 98.3 (12-27-23 @ 14:00)  HR: 81 (12-28-23 @ 06:00) (74 - 81)  BP: 141/69 (12-28-23 @ 08:00) (117/56 - 166/70)  BP(mean): 97 (12-28-23 @ 08:00) (80 - 101)  ABP: --  RR: 25 (12-28-23 @ 06:00) (20 - 54)  SpO2: 94% (12-28-23 @ 06:00) (94% - 100%)    PHYSICAL EXAM:   Neurological: AAOx3, CNII-XII intact,  strength 5/5 b/l  ENT: mucus membrane moist  Cardiovascular: RRR  Respiratory: CTA  Gastrointestinal: soft, NT, ND, BS+, fistula thickish yellowish formed output, no bleeding. surgicel with overlying ABD dressing intact. perc cony bilious,  Extremities: warm, no dependent edema  Vascular: no cyanosis/erythema  Skin: no rashes  MSK: no joint swelling.      LABS:    12-28    135  |  102  |  45<H>  ----------------------------<  127<H>  4.3   |  28  |  1.62<H>    Ca    8.6      28 Dec 2023 04:37  Phos  3.5     12-28  Mg     2.2     12-28    TPro  8.3  /  Alb  2.2<L>  /  TBili  6.4<H>  /  DBili  4.1<H>  /  AST  111<H>  /  ALT  72<H>  /  AlkPhos  118  12-28  LIVER FUNCTIONS - ( 28 Dec 2023 04:37 )  Alb: 2.2 g/dL / Pro: 8.3 g/dL / ALK PHOS: 118 U/L / ALT: 72 U/L / AST: 111 U/L / GGT: x                               7.8    7.46  )-----------( 212      ( 28 Dec 2023 04:37 )             24.0     Urinalysis Basic - ( 28 Dec 2023 04:37 )    Color: x / Appearance: x / SG: x / pH: x  Gluc: 127 mg/dL / Ketone: x  / Bili: x / Urobili: x   Blood: x / Protein: x / Nitrite: x   Leuk Esterase: x / RBC: x / WBC x   Sq Epi: x / Non Sq Epi: x / Bacteria: x    CAPILLARY BLOOD GLUCOSE        MEDICATIONS  (STANDING):  chlorhexidine 2% Cloths 1 Application(s) Topical <User Schedule>  cholestyramine Powder (Sugar-Free) 4 Gram(s) Oral daily  epoetin matt-epbx (RETACRIT) Injectable 61297 Unit(s) SubCutaneous every 7 days  ergocalciferol 76481 Unit(s) Oral <User Schedule>  glucagon  Injectable 1 milliGRAM(s) IntraMuscular once  heparin   Injectable 5000 Unit(s) SubCutaneous every 8 hours  labetalol Injectable 10 milliGRAM(s) IV Push every 6 hours  levothyroxine Injectable 30 MICROGram(s) IV Push at bedtime  lipid, fat emulsion (Fish Oil and Plant Based) 20% Infusion 0.54 Gm/kG/Day (20.83 mL/Hr) IV Continuous <Continuous>  lipid, fat emulsion (Fish Oil and Plant Based) 20% Infusion 0.54 Gm/kG/Day (20.83 mL/Hr) IV Continuous <Continuous>  Parenteral Nutrition - Adult 1 Each TPN Continuous <Continuous>  Parenteral Nutrition - Adult 1 Each TPN Continuous <Continuous>  silver nitrate Applicator 1 Application(s) Topical once  ursodiol Suspension 300 milliGRAM(s) Oral every 8 hours  venlafaxine XR. 150 milliGRAM(s) Oral daily    MEDICATIONS  (PRN):  hydrALAZINE Injectable 10 milliGRAM(s) IV Push every 6 hours PRN SBP >170  lidocaine 2% Viscous 10 milliLiter(s) Swish and Spit every 12 hours PRN tongue pain  LORazepam   Injectable 1 milliGRAM(s) IV Push <User Schedule> PRN Anxiety  ondansetron Injectable 4 milliGRAM(s) IV Push every 6 hours PRN Nausea and/or Vomiting      Poole:	  [x ] None	[ ] Daily Poole Order Placed	   Indication:	  [ ] Strict I and O's    [ ] Obstruction     [ ] Incontinence + Stage 3 or 4 Decubitus  Central Line:  [ ] None	   [x ]  Medication / TPN Administration     [ ] No Peripheral IV        S: pt says he feels "winded" when he walks, thinks that he needs blood transfusion given bleeding from wound yesterday.     O: ICU Vital Signs Last 24 Hrs  T(F): 98.1 (12-28-23 @ 05:40), Max: 98.3 (12-27-23 @ 14:00)  HR: 81 (12-28-23 @ 06:00) (74 - 81)  BP: 141/69 (12-28-23 @ 08:00) (117/56 - 166/70)  BP(mean): 97 (12-28-23 @ 08:00) (80 - 101)  ABP: --  RR: 25 (12-28-23 @ 06:00) (20 - 54)  SpO2: 94% (12-28-23 @ 06:00) (94% - 100%)    PHYSICAL EXAM:   Neurological: AAOx3, CNII-XII intact,  strength 5/5 b/l  ENT: mucus membrane moist  Cardiovascular: RRR  Respiratory: CTA  Gastrointestinal: soft, NT, ND, BS+, fistula thickish yellowish formed output, no bleeding. surgicel with overlying ABD dressing intact. perc cony bilious,  Extremities: warm, no dependent edema  Vascular: no cyanosis/erythema  Skin: no rashes  MSK: no joint swelling.      LABS:    12-28    135  |  102  |  45<H>  ----------------------------<  127<H>  4.3   |  28  |  1.62<H>    Ca    8.6      28 Dec 2023 04:37  Phos  3.5     12-28  Mg     2.2     12-28    TPro  8.3  /  Alb  2.2<L>  /  TBili  6.4<H>  /  DBili  4.1<H>  /  AST  111<H>  /  ALT  72<H>  /  AlkPhos  118  12-28  LIVER FUNCTIONS - ( 28 Dec 2023 04:37 )  Alb: 2.2 g/dL / Pro: 8.3 g/dL / ALK PHOS: 118 U/L / ALT: 72 U/L / AST: 111 U/L / GGT: x                               7.8    7.46  )-----------( 212      ( 28 Dec 2023 04:37 )             24.0     Urinalysis Basic - ( 28 Dec 2023 04:37 )    Color: x / Appearance: x / SG: x / pH: x  Gluc: 127 mg/dL / Ketone: x  / Bili: x / Urobili: x   Blood: x / Protein: x / Nitrite: x   Leuk Esterase: x / RBC: x / WBC x   Sq Epi: x / Non Sq Epi: x / Bacteria: x    CAPILLARY BLOOD GLUCOSE        MEDICATIONS  (STANDING):  chlorhexidine 2% Cloths 1 Application(s) Topical <User Schedule>  cholestyramine Powder (Sugar-Free) 4 Gram(s) Oral daily  epoetin matt-epbx (RETACRIT) Injectable 52444 Unit(s) SubCutaneous every 7 days  ergocalciferol 62642 Unit(s) Oral <User Schedule>  glucagon  Injectable 1 milliGRAM(s) IntraMuscular once  heparin   Injectable 5000 Unit(s) SubCutaneous every 8 hours  labetalol Injectable 10 milliGRAM(s) IV Push every 6 hours  levothyroxine Injectable 30 MICROGram(s) IV Push at bedtime  lipid, fat emulsion (Fish Oil and Plant Based) 20% Infusion 0.54 Gm/kG/Day (20.83 mL/Hr) IV Continuous <Continuous>  lipid, fat emulsion (Fish Oil and Plant Based) 20% Infusion 0.54 Gm/kG/Day (20.83 mL/Hr) IV Continuous <Continuous>  Parenteral Nutrition - Adult 1 Each TPN Continuous <Continuous>  Parenteral Nutrition - Adult 1 Each TPN Continuous <Continuous>  silver nitrate Applicator 1 Application(s) Topical once  ursodiol Suspension 300 milliGRAM(s) Oral every 8 hours  venlafaxine XR. 150 milliGRAM(s) Oral daily    MEDICATIONS  (PRN):  hydrALAZINE Injectable 10 milliGRAM(s) IV Push every 6 hours PRN SBP >170  lidocaine 2% Viscous 10 milliLiter(s) Swish and Spit every 12 hours PRN tongue pain  LORazepam   Injectable 1 milliGRAM(s) IV Push <User Schedule> PRN Anxiety  ondansetron Injectable 4 milliGRAM(s) IV Push every 6 hours PRN Nausea and/or Vomiting      Poole:	  [x ] None	[ ] Daily Poole Order Placed	   Indication:	  [ ] Strict I and O's    [ ] Obstruction     [ ] Incontinence + Stage 3 or 4 Decubitus  Central Line:  [ ] None	   [x ]  Medication / TPN Administration     [ ] No Peripheral IV

## 2023-12-28 NOTE — PROGRESS NOTE ADULT - ASSESSMENT
A/P  Bled from abdominal wound edge yesterday which was controoled.  However, had leak around the manager.  WOCN team headed by Imelda is taking down the manager and will place another onto this very challenging wound/fistula.  Output from fistula notably thicker and less last shift.  Ambulating well.  To continue.  TPN and regular diet as before.  Hgb drifting down and patient feels a bit weaker.  Patient is requesting a transfusion.  Mentioned to ICU team who will consider.  I am fine with transfusion.

## 2023-12-28 NOTE — PROGRESS NOTE ADULT - SUBJECTIVE AND OBJECTIVE BOX
Colorectal Surgery  (covering for Dr. Peterson)  5 ICU  TPN    Tolerating regular diet.  No N/V.  Ambulated this AM in hallway  Leakage around wound manager noted this AM  No pain or other complaints.    Vital Signs Last 24 Hrs  T(C): 36.7 (28 Dec 2023 05:40), Max: 36.8 (27 Dec 2023 14:00)  T(F): 98.1 (28 Dec 2023 05:40), Max: 98.3 (27 Dec 2023 14:00)  HR: 81 (28 Dec 2023 06:00) (74 - 81)  BP: 141/69 (28 Dec 2023 08:00) (118/56 - 166/70)  BP(mean): 97 (28 Dec 2023 08:00) (81 - 101)  RR: 25 (28 Dec 2023 06:00) (20 - 54)  SpO2: 94% (28 Dec 2023 06:00) (94% - 100%)    Parameters below as of 28 Dec 2023 08:00  Patient On (Oxygen Delivery Method): room air    abd: not distended, RUQ ileostomy site has red rubber in place, minimal output  R flank cony drain in place  wound manager on fistula in place with some reinforcement (WOCN team about to address)  ext: without calf tenderness    UO:   875 ml/last shift,  1,125 ml/24 hrs  fistula:  250 ml/last shift, 800 ml/last 24 hrs  cony: 500 ml/last shift, 1,225 ml/24 hrs  ileostomy: 55 ml/24 hrs    TPN:  2,314 ml/24 hrs                          7.8    7.46  )-----------( 212      ( 28 Dec 2023 04:37 )             24.0   12-28    135  |  102  |  45<H>  ----------------------------<  127<H>  4.3   |  28  |  1.62<H>    Ca    8.6      28 Dec 2023 04:37  Phos  3.5     12-28  Mg     2.2     12-28    TPro  8.3  /  Alb  2.2<L>  /  TBili  6.4<H>  /  DBili  4.1<H>  /  AST  111<H>  /  ALT  72<H>  /  AlkPhos  118  12-28

## 2023-12-28 NOTE — PROGRESS NOTE ADULT - SUBJECTIVE AND OBJECTIVE BOX
SUBJECTIVE:  Pt seen this AM on rounds. Endorses resolution of punctate bleeding in ostomy appliance and no more leaking from edges. Denies nausea/vomiting.   S/p 1L bolus 12/27    MEDICATIONS  (STANDING):  chlorhexidine 2% Cloths 1 Application(s) Topical <User Schedule>  cholestyramine Powder (Sugar-Free) 4 Gram(s) Oral daily  epoetin matt-epbx (RETACRIT) Injectable 90891 Unit(s) SubCutaneous every 7 days  ergocalciferol 71547 Unit(s) Oral <User Schedule>  glucagon  Injectable 1 milliGRAM(s) IntraMuscular once  heparin   Injectable 5000 Unit(s) SubCutaneous every 8 hours  labetalol Injectable 10 milliGRAM(s) IV Push every 6 hours  levothyroxine Injectable 30 MICROGram(s) IV Push at bedtime  lipid, fat emulsion (Fish Oil and Plant Based) 20% Infusion 0.54 Gm/kG/Day (20.83 mL/Hr) IV Continuous <Continuous>  lipid, fat emulsion (Fish Oil and Plant Based) 20% Infusion 0.54 Gm/kG/Day (20.83 mL/Hr) IV Continuous <Continuous>  Parenteral Nutrition - Adult 1 Each TPN Continuous <Continuous>  Parenteral Nutrition - Adult 1 Each TPN Continuous <Continuous>  silver nitrate Applicator 1 Application(s) Topical once  ursodiol Suspension 300 milliGRAM(s) Oral every 8 hours  venlafaxine XR. 150 milliGRAM(s) Oral daily    MEDICATIONS  (PRN):  hydrALAZINE Injectable 10 milliGRAM(s) IV Push every 6 hours PRN SBP >170  lidocaine 2% Viscous 10 milliLiter(s) Swish and Spit every 12 hours PRN tongue pain  LORazepam   Injectable 1 milliGRAM(s) IV Push <User Schedule> PRN Anxiety  ondansetron Injectable 4 milliGRAM(s) IV Push every 6 hours PRN Nausea and/or Vomiting      Vital Signs Last 24 Hrs  T(C): 36.7 (28 Dec 2023 05:40), Max: 36.8 (27 Dec 2023 14:00)  T(F): 98.1 (28 Dec 2023 05:40), Max: 98.3 (27 Dec 2023 14:00)  HR: 79 (28 Dec 2023 07:00) (74 - 81)  BP: 152/65 (28 Dec 2023 06:00) (117/56 - 166/70)  BP(mean): 94 (28 Dec 2023 06:00) (80 - 101)  RR: 26 (28 Dec 2023 07:00) (20 - 54)  SpO2: 97% (28 Dec 2023 07:00) (94% - 100%)    Parameters below as of 28 Dec 2023 06:00  Patient On (Oxygen Delivery Method): room air        Physical Exam:  General: NAD, resting comfortably in bed  Pulmonary: Nonlabored breathing, no respiratory distress  Cardiovascular: NSR  Abdominal: soft, NT/ND  Extremities: WWP, normal strength  Neuro: A/O x 3, CNs II-XII grossly intact, no focal deficits    I&O's Summary    27 Dec 2023 07:01  -  28 Dec 2023 07:00  --------------------------------------------------------  IN: 3178.4 mL / OUT: 2405 mL / NET: 773.4 mL        LABS:                        7.8    7.46  )-----------( 212      ( 28 Dec 2023 04:37 )             24.0     12-28    135  |  102  |  45<H>  ----------------------------<  127<H>  4.3   |  28  |  1.62<H>    Ca    8.6      28 Dec 2023 04:37  Phos  3.5     12-28  Mg     2.2     12-28    TPro  8.3  /  Alb  2.2<L>  /  TBili  6.4<H>  /  DBili  4.1<H>  /  AST  111<H>  /  ALT  72<H>  /  AlkPhos  118  12-28      Urinalysis Basic - ( 28 Dec 2023 04:37 )    Color: x / Appearance: x / SG: x / pH: x  Gluc: 127 mg/dL / Ketone: x  / Bili: x / Urobili: x   Blood: x / Protein: x / Nitrite: x   Leuk Esterase: x / RBC: x / WBC x   Sq Epi: x / Non Sq Epi: x / Bacteria: x      CAPILLARY BLOOD GLUCOSE        LIVER FUNCTIONS - ( 28 Dec 2023 04:37 )  Alb: 2.2 g/dL / Pro: 8.3 g/dL / ALK PHOS: 118 U/L / ALT: 72 U/L / AST: 111 U/L / GGT: x             RADIOLOGY & ADDITIONAL STUDIES: SUBJECTIVE:  Pt seen this AM on rounds. Endorses resolution of punctate bleeding in ostomy appliance and no more leaking from edges. Denies nausea/vomiting.   S/p 1L bolus 12/27    MEDICATIONS  (STANDING):  chlorhexidine 2% Cloths 1 Application(s) Topical <User Schedule>  cholestyramine Powder (Sugar-Free) 4 Gram(s) Oral daily  epoetin matt-epbx (RETACRIT) Injectable 94454 Unit(s) SubCutaneous every 7 days  ergocalciferol 41614 Unit(s) Oral <User Schedule>  glucagon  Injectable 1 milliGRAM(s) IntraMuscular once  heparin   Injectable 5000 Unit(s) SubCutaneous every 8 hours  labetalol Injectable 10 milliGRAM(s) IV Push every 6 hours  levothyroxine Injectable 30 MICROGram(s) IV Push at bedtime  lipid, fat emulsion (Fish Oil and Plant Based) 20% Infusion 0.54 Gm/kG/Day (20.83 mL/Hr) IV Continuous <Continuous>  lipid, fat emulsion (Fish Oil and Plant Based) 20% Infusion 0.54 Gm/kG/Day (20.83 mL/Hr) IV Continuous <Continuous>  Parenteral Nutrition - Adult 1 Each TPN Continuous <Continuous>  Parenteral Nutrition - Adult 1 Each TPN Continuous <Continuous>  silver nitrate Applicator 1 Application(s) Topical once  ursodiol Suspension 300 milliGRAM(s) Oral every 8 hours  venlafaxine XR. 150 milliGRAM(s) Oral daily    MEDICATIONS  (PRN):  hydrALAZINE Injectable 10 milliGRAM(s) IV Push every 6 hours PRN SBP >170  lidocaine 2% Viscous 10 milliLiter(s) Swish and Spit every 12 hours PRN tongue pain  LORazepam   Injectable 1 milliGRAM(s) IV Push <User Schedule> PRN Anxiety  ondansetron Injectable 4 milliGRAM(s) IV Push every 6 hours PRN Nausea and/or Vomiting      Vital Signs Last 24 Hrs  T(C): 36.7 (28 Dec 2023 05:40), Max: 36.8 (27 Dec 2023 14:00)  T(F): 98.1 (28 Dec 2023 05:40), Max: 98.3 (27 Dec 2023 14:00)  HR: 79 (28 Dec 2023 07:00) (74 - 81)  BP: 152/65 (28 Dec 2023 06:00) (117/56 - 166/70)  BP(mean): 94 (28 Dec 2023 06:00) (80 - 101)  RR: 26 (28 Dec 2023 07:00) (20 - 54)  SpO2: 97% (28 Dec 2023 07:00) (94% - 100%)    Parameters below as of 28 Dec 2023 06:00  Patient On (Oxygen Delivery Method): room air        Physical Exam:  General: NAD, resting comfortably in bed  Pulmonary: Nonlabored breathing, no respiratory distress  Cardiovascular: NSR  Abdominal: soft, NT/ND  Extremities: WWP, normal strength  Neuro: A/O x 3, CNs II-XII grossly intact, no focal deficits    I&O's Summary    27 Dec 2023 07:01  -  28 Dec 2023 07:00  --------------------------------------------------------  IN: 3178.4 mL / OUT: 2405 mL / NET: 773.4 mL        LABS:                        7.8    7.46  )-----------( 212      ( 28 Dec 2023 04:37 )             24.0     12-28    135  |  102  |  45<H>  ----------------------------<  127<H>  4.3   |  28  |  1.62<H>    Ca    8.6      28 Dec 2023 04:37  Phos  3.5     12-28  Mg     2.2     12-28    TPro  8.3  /  Alb  2.2<L>  /  TBili  6.4<H>  /  DBili  4.1<H>  /  AST  111<H>  /  ALT  72<H>  /  AlkPhos  118  12-28      Urinalysis Basic - ( 28 Dec 2023 04:37 )    Color: x / Appearance: x / SG: x / pH: x  Gluc: 127 mg/dL / Ketone: x  / Bili: x / Urobili: x   Blood: x / Protein: x / Nitrite: x   Leuk Esterase: x / RBC: x / WBC x   Sq Epi: x / Non Sq Epi: x / Bacteria: x      CAPILLARY BLOOD GLUCOSE        LIVER FUNCTIONS - ( 28 Dec 2023 04:37 )  Alb: 2.2 g/dL / Pro: 8.3 g/dL / ALK PHOS: 118 U/L / ALT: 72 U/L / AST: 111 U/L / GGT: x             RADIOLOGY & ADDITIONAL STUDIES:

## 2023-12-28 NOTE — ADVANCED PRACTICE NURSE CONSULT - ASSESSMENT
Peristomal skin on inferior aspect of donny-fistula skin bleeding resolved. 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 Donny-fistula skin with scattered areas of denuded skin.   WOCN cleansed peristomal skin  with Vashe, applied stoma powder, sealed with skin prep, applied an ostomy ring around the fistula, then a [place] one piece, soft convex, cut to fit pouching system, secured edges with sections of Comfeel dressing.   Applied Drawtex to denuded skin inferior to pouching system. Peristomal skin on inferior aspect of donny-fistula skin bleeding resolved. 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 Donny-fistula skin with scattered areas of denuded skin.   WOCN cleansed peristomal skin  with Vashe, applied stoma powder, sealed with skin prep, applied an ostomy ring around the fistula, then a [place] one piece, soft convex, cut to fit pouching system, secured edges with sections of Comfeel dressing.   Applied Drawtex to denuded skin inferior to pouching system.

## 2023-12-28 NOTE — PROGRESS NOTE ADULT - SUBJECTIVE AND OBJECTIVE BOX
SUBJECTIVE:  Pt seen this AM on rounds. Endorses resolution of punctate bleeding in ostomy appliance and no more leaking from edges. Denies nausea/vomiting.     MEDICATIONS  (STANDING):  chlorhexidine 2% Cloths 1 Application(s) Topical <User Schedule>  cholestyramine Powder (Sugar-Free) 4 Gram(s) Oral daily  epoetin matt-epbx (RETACRIT) Injectable 31895 Unit(s) SubCutaneous every 7 days  ergocalciferol 04344 Unit(s) Oral <User Schedule>  glucagon  Injectable 1 milliGRAM(s) IntraMuscular once  heparin   Injectable 5000 Unit(s) SubCutaneous every 8 hours  labetalol Injectable 10 milliGRAM(s) IV Push every 6 hours  levothyroxine Injectable 30 MICROGram(s) IV Push at bedtime  lipid, fat emulsion (Fish Oil and Plant Based) 20% Infusion 0.54 Gm/kG/Day (20.83 mL/Hr) IV Continuous <Continuous>  lipid, fat emulsion (Fish Oil and Plant Based) 20% Infusion 0.54 Gm/kG/Day (20.83 mL/Hr) IV Continuous <Continuous>  Parenteral Nutrition - Adult 1 Each TPN Continuous <Continuous>  Parenteral Nutrition - Adult 1 Each TPN Continuous <Continuous>  silver nitrate Applicator 1 Application(s) Topical once  ursodiol Suspension 300 milliGRAM(s) Oral every 8 hours  venlafaxine XR. 150 milliGRAM(s) Oral daily    MEDICATIONS  (PRN):  hydrALAZINE Injectable 10 milliGRAM(s) IV Push every 6 hours PRN SBP >170  lidocaine 2% Viscous 10 milliLiter(s) Swish and Spit every 12 hours PRN tongue pain  LORazepam   Injectable 1 milliGRAM(s) IV Push <User Schedule> PRN Anxiety  ondansetron Injectable 4 milliGRAM(s) IV Push every 6 hours PRN Nausea and/or Vomiting      Vital Signs Last 24 Hrs  T(C): 36.7 (28 Dec 2023 05:40), Max: 36.8 (27 Dec 2023 14:00)  T(F): 98.1 (28 Dec 2023 05:40), Max: 98.3 (27 Dec 2023 14:00)  HR: 79 (28 Dec 2023 07:00) (74 - 81)  BP: 152/65 (28 Dec 2023 06:00) (117/56 - 166/70)  BP(mean): 94 (28 Dec 2023 06:00) (80 - 101)  RR: 26 (28 Dec 2023 07:00) (20 - 54)  SpO2: 97% (28 Dec 2023 07:00) (94% - 100%)    Parameters below as of 28 Dec 2023 06:00  Patient On (Oxygen Delivery Method): room air        Physical Exam:  General: NAD, resting comfortably in bed  Pulmonary: Nonlabored breathing, no respiratory distress  Cardiovascular: NSR  Abdominal: soft, NT/ND  Extremities: WWP, normal strength  Neuro: A/O x 3, CNs II-XII grossly intact, no focal deficits    I&O's Summary    27 Dec 2023 07:01  -  28 Dec 2023 07:00  --------------------------------------------------------  IN: 3178.4 mL / OUT: 2405 mL / NET: 773.4 mL        LABS:                        7.8    7.46  )-----------( 212      ( 28 Dec 2023 04:37 )             24.0     12-28    135  |  102  |  45<H>  ----------------------------<  127<H>  4.3   |  28  |  1.62<H>    Ca    8.6      28 Dec 2023 04:37  Phos  3.5     12-28  Mg     2.2     12-28    TPro  8.3  /  Alb  2.2<L>  /  TBili  6.4<H>  /  DBili  4.1<H>  /  AST  111<H>  /  ALT  72<H>  /  AlkPhos  118  12-28      Urinalysis Basic - ( 28 Dec 2023 04:37 )    Color: x / Appearance: x / SG: x / pH: x  Gluc: 127 mg/dL / Ketone: x  / Bili: x / Urobili: x   Blood: x / Protein: x / Nitrite: x   Leuk Esterase: x / RBC: x / WBC x   Sq Epi: x / Non Sq Epi: x / Bacteria: x      CAPILLARY BLOOD GLUCOSE        LIVER FUNCTIONS - ( 28 Dec 2023 04:37 )  Alb: 2.2 g/dL / Pro: 8.3 g/dL / ALK PHOS: 118 U/L / ALT: 72 U/L / AST: 111 U/L / GGT: x             RADIOLOGY & ADDITIONAL STUDIES:   SUBJECTIVE:  Pt seen this AM on rounds. Endorses resolution of punctate bleeding in ostomy appliance and no more leaking from edges. Denies nausea/vomiting.     MEDICATIONS  (STANDING):  chlorhexidine 2% Cloths 1 Application(s) Topical <User Schedule>  cholestyramine Powder (Sugar-Free) 4 Gram(s) Oral daily  epoetin matt-epbx (RETACRIT) Injectable 63670 Unit(s) SubCutaneous every 7 days  ergocalciferol 59684 Unit(s) Oral <User Schedule>  glucagon  Injectable 1 milliGRAM(s) IntraMuscular once  heparin   Injectable 5000 Unit(s) SubCutaneous every 8 hours  labetalol Injectable 10 milliGRAM(s) IV Push every 6 hours  levothyroxine Injectable 30 MICROGram(s) IV Push at bedtime  lipid, fat emulsion (Fish Oil and Plant Based) 20% Infusion 0.54 Gm/kG/Day (20.83 mL/Hr) IV Continuous <Continuous>  lipid, fat emulsion (Fish Oil and Plant Based) 20% Infusion 0.54 Gm/kG/Day (20.83 mL/Hr) IV Continuous <Continuous>  Parenteral Nutrition - Adult 1 Each TPN Continuous <Continuous>  Parenteral Nutrition - Adult 1 Each TPN Continuous <Continuous>  silver nitrate Applicator 1 Application(s) Topical once  ursodiol Suspension 300 milliGRAM(s) Oral every 8 hours  venlafaxine XR. 150 milliGRAM(s) Oral daily    MEDICATIONS  (PRN):  hydrALAZINE Injectable 10 milliGRAM(s) IV Push every 6 hours PRN SBP >170  lidocaine 2% Viscous 10 milliLiter(s) Swish and Spit every 12 hours PRN tongue pain  LORazepam   Injectable 1 milliGRAM(s) IV Push <User Schedule> PRN Anxiety  ondansetron Injectable 4 milliGRAM(s) IV Push every 6 hours PRN Nausea and/or Vomiting      Vital Signs Last 24 Hrs  T(C): 36.7 (28 Dec 2023 05:40), Max: 36.8 (27 Dec 2023 14:00)  T(F): 98.1 (28 Dec 2023 05:40), Max: 98.3 (27 Dec 2023 14:00)  HR: 79 (28 Dec 2023 07:00) (74 - 81)  BP: 152/65 (28 Dec 2023 06:00) (117/56 - 166/70)  BP(mean): 94 (28 Dec 2023 06:00) (80 - 101)  RR: 26 (28 Dec 2023 07:00) (20 - 54)  SpO2: 97% (28 Dec 2023 07:00) (94% - 100%)    Parameters below as of 28 Dec 2023 06:00  Patient On (Oxygen Delivery Method): room air        Physical Exam:  General: NAD, resting comfortably in bed  Pulmonary: Nonlabored breathing, no respiratory distress  Cardiovascular: NSR  Abdominal: soft, NT/ND  Extremities: WWP, normal strength  Neuro: A/O x 3, CNs II-XII grossly intact, no focal deficits    I&O's Summary    27 Dec 2023 07:01  -  28 Dec 2023 07:00  --------------------------------------------------------  IN: 3178.4 mL / OUT: 2405 mL / NET: 773.4 mL        LABS:                        7.8    7.46  )-----------( 212      ( 28 Dec 2023 04:37 )             24.0     12-28    135  |  102  |  45<H>  ----------------------------<  127<H>  4.3   |  28  |  1.62<H>    Ca    8.6      28 Dec 2023 04:37  Phos  3.5     12-28  Mg     2.2     12-28    TPro  8.3  /  Alb  2.2<L>  /  TBili  6.4<H>  /  DBili  4.1<H>  /  AST  111<H>  /  ALT  72<H>  /  AlkPhos  118  12-28      Urinalysis Basic - ( 28 Dec 2023 04:37 )    Color: x / Appearance: x / SG: x / pH: x  Gluc: 127 mg/dL / Ketone: x  / Bili: x / Urobili: x   Blood: x / Protein: x / Nitrite: x   Leuk Esterase: x / RBC: x / WBC x   Sq Epi: x / Non Sq Epi: x / Bacteria: x      CAPILLARY BLOOD GLUCOSE        LIVER FUNCTIONS - ( 28 Dec 2023 04:37 )  Alb: 2.2 g/dL / Pro: 8.3 g/dL / ALK PHOS: 118 U/L / ALT: 72 U/L / AST: 111 U/L / GGT: x             RADIOLOGY & ADDITIONAL STUDIES:

## 2023-12-28 NOTE — PROGRESS NOTE ADULT - ATTENDING COMMENTS
UC, AF s/p SBR with ileocolic resection, ileostomy, anemia  physical as above  continue TPN  transfusing one unit PRBC  continue metoprolol  mobilizing

## 2023-12-29 NOTE — PROGRESS NOTE ADULT - ASSESSMENT
A/P Bleeding intermittently on a daily basis.    Currently, ok.   Hgb drifting down with the bleeding.  May need transfusion again soon.   Otherwise as before.   BUN / creat 45/1.62  Will follow.

## 2023-12-29 NOTE — PROVIDER CONTACT NOTE (CHANGE IN STATUS NOTIFICATION) - SITUATION
Patient complaining of tongue "burning," blood observed on edge of his cups, mouth appears to be bleeding, JOSEPH Avelar notified, assessed at bedside, pt was told to keep gauze in his mouth to stop the bleeding, pt verbalized understanding
Pt. had change in heart rhythm from NSR to 1st Degree AVB d/t prolonged ARCHIE.
Pt complaining of nausea, emesis bin provided, MD made aware, zofran 4mg administered
Pt. with bloody output through midline abdominal Eakins drain.
Pt Dustin pouch draining serosanguinous drainage. large amount

## 2023-12-29 NOTE — PROVIDER CONTACT NOTE (CHANGE IN STATUS NOTIFICATION) - ACTION/TREATMENT ORDERED:
No interventions, or orders at this time, will continue to monitor.
zofran prn
gauze applied
Silver Nitrate ordered per SICU team. Will Reassess for more bloody output @ 0200.
Area not actively draining blood. liquid food drained from pouch.

## 2023-12-29 NOTE — CHART NOTE - NSCHARTNOTEFT_GEN_A_CORE
Admitting Diagnosis:   Patient is a 78y old  Male who presents with a chief complaint of SBO (29 Dec 2023 08:33)      PAST MEDICAL & SURGICAL HISTORY:  Essential hypertension  Hypertension      Atrial fibrillation  s/p cardioversion  and   Pt. reports 4 DCCV  Now on Amiodarone 200mg PO bid and Eliquis 5mg PO bid  Last DCCV 4 yrs ago at [facility]      Crohn's disease  s/p partial resection of ileum      Hyperlipidemia      Hypothyroidism      History of depression  On Venlafaxine ER 150mg PO bid      Junctional rhythm      H/O knee surgery      History of cataract surgery          Current Nutrition Order:   TPN via PICC- 1.9L bag running over 14hrs (135ml/hr), providing 380g Dex, 120g AA, 50g SMOF lipids daily; provides 2272 kcals, 120g protein daily, GIR 4.86 mg/kg/min   PO- Regular diet + 2 LPS per day [provide 100 kcals, 15g protein each], + Ensure Max daily [150 kcals, 20g protein]    PO Intake: Good (%) [   ]  Fair (50-75%) [ x ] Poor (<25%) [   ]    GI Issues:   : ileostomy output 50cc x 24hrs, abdominal wound/fistula output 910cc x 24hrs, per cony output 1225cc x 24hrs    : ileostomy output 50cc x 24hrs, abdominal wound/fistula output 1175cc x 24hrs, per cony output 1050cc x 24hrs    : ileostomy output 50cc x 24hrs, abdominal wound/fistula output 1100cc x 24hrs, per cony output 1050cc x 24hrs       : ileostomy output 20cc x 24hrs, abdominal wound/fistula output 1275cc x 24hrs, per cony output 775cc x 24hrs      : ileostomy output 15cc x 24hrs, abdominal wound/fistula output  1230cc x 24hrs, per cony output 1010cc x 24hrs      : ileostomy output 75cc x 24hrs, abdominal wound/fistula output  600cc x 24hrs, per cony output 1125cc x 24hrs      : ileostomy output 35cc x 24hrs, abdominal wound/fistula output  250cc x 24hrs, per cony output 745cc x 24hrs      : ileostomy output 30cc x 24hrs, abdominal wound/fistula output  2400cc x 24hrs, per cony output 550cc x 24hrs     : ileostomy output 50 cc x 24hrs, abdominal wound/fistula output 2175 cc x 24hrs, per cony output 530 cc x 24hrs     : ileostomy output 25cc x 24hrs, abdominal wound/fistula output 2350 cc x 24hrs, per cony output 775cc x 24hrs     : ileostomy output 55cc x 24hrs, abdominal wound/fistula output 2025 cc x 24hrs, per cony output 450cc x 24hrs     11/15:  ileostomy output 65cc x 24hrs, abdominal wound/fistula output 875cc x 24hrs, per cony output 775cc x 24hrs    Pain: no pain/discomfort noted    Skin Integrity: lower back wound/healed, jaundice, abd wound and fistula with wound manager in place, RLQ ileostomy, perc cony drain, Rudy score 19    Labs:       135  |  102  |  45<H>  ----------------------------<  127<H>  4.3   |  28  |  1.62<H>    Ca    8.6      28 Dec 2023 04:37  Phos  3.5       Mg     2.2         TPro  8.3  /  Alb  2.2<L>  /  TBili  6.4<H>  /  DBili  4.1<H>  /  AST  111<H>  /  ALT  72<H>  /  AlkPhos  118      CAPILLARY BLOOD GLUCOSE          Medications:  MEDICATIONS  (STANDING):  chlorhexidine 2% Cloths 1 Application(s) Topical <User Schedule>  cholestyramine Powder (Sugar-Free) 4 Gram(s) Oral daily  epoetin matt-epbx (RETACRIT) Injectable 18750 Unit(s) SubCutaneous every 7 days  ergocalciferol 55683 Unit(s) Oral <User Schedule>  gabapentin 100 milliGRAM(s) Oral at bedtime  glucagon  Injectable 1 milliGRAM(s) IntraMuscular once  labetalol Injectable 10 milliGRAM(s) IV Push every 6 hours  levothyroxine Injectable 30 MICROGram(s) IV Push at bedtime  lipid, fat emulsion (Fish Oil and Plant Based) 20% Infusion 0.54 Gm/kG/Day (20.83 mL/Hr) IV Continuous <Continuous>  Parenteral Nutrition - Adult 1 Each TPN Continuous <Continuous>  Parenteral Nutrition - Adult 1 Each TPN Continuous <Continuous>  silver nitrate Applicator 1 Application(s) Topical once  ursodiol Suspension 300 milliGRAM(s) Oral every 8 hours  venlafaxine XR. 150 milliGRAM(s) Oral daily    MEDICATIONS  (PRN):  chlorhexidine 0.12% Liquid 15 milliLiter(s) Swish and Spit two times a day PRN oral hygeine  hydrALAZINE Injectable 10 milliGRAM(s) IV Push every 6 hours PRN SBP >170  lidocaine 2% Viscous 10 milliLiter(s) Swish and Spit every 12 hours PRN tongue pain  LORazepam   Injectable 1 milliGRAM(s) IV Push <User Schedule> PRN Anxiety  ondansetron Injectable 4 milliGRAM(s) IV Push every 6 hours PRN Nausea and/or Vomiting      Daily Weight in k.3kg [19 Dec 2023]  Daily 93.3kg [11 Dec 2023]  Daily 93.3kg [08 Dec 2023]  Daily 93.3kg [6 Dec 2023]  Daily 94.2kg [2023]  Daily 94.4kg [2023]  Daily 94.2kg [2023]  Daily 94.2kg [2023]  Daily 94.2kg [2023]  Daily 94.2 [15 Nov 2023]  Daily 94.2kg [2023]  Daily 95.8kg [2023]  Daily 94.2kg [2023]  Daily 85.2kg [2023]  Daily 85.2kg [31 Oct 2023]  Daily 85.2kg [24 Oct 2023]  Daily 85.2kg [20 Oct 2023]  Daily 81.9kg [18 Oct 2023]  Daily 85.2kg [16 Oct 2023]  Daily   95.2kg [12 Oct 2023]   Daily 87.7kg [11 Oct 2023]  Daily 87.4kg [09 Oct 2023]  Daily 86.4kg [07 Oct 2023]  Daily 82.5kg [06 Oct 2023]  Daily 82.6kg [4 Oct 2023]   Daily 83.5kg [03 Oct 2023]  Daily 84.5kg [2 Oct 2023]  Daily 87.2kg [1 Oct 2023]  Daily 88.3kg [29 Sep 2023]  Daily 89.9kg [28 Sep 2023]  Daily 87.7kg [24 Sep 2023]  Daily 90.4kg [21 Sep 2023]  Daily 91.4kg [20 Sep 2023]  Daily 86.7kg [18 Sep 2023]  Daily 88.1kg [16 Sep 2023]  Daily 88.9kg [15 Sep 2023]   Daily 89.1 [14 Sep 2023]  Daily 92.9kg [6 Sep 2023]  Daily 91.8kg [27 Aug 2023]  Daily 101kg [9 Aug 2023]     Weight Change: weight trending down throughout admission, now appears to be trending back up. Recommend continue weekly/daily weights for trending & ensuring adequacy of nutrition provision    IBW: 86.4kg  % IBW: 108.0    Estimated energy needs:   Ideal body weight (86.4kg) used for calculations as pt >100% IBW and BMI <30 per [facility] Standards of Care. Needs estimated for age and adjusted for current clinical status, increased needs for post-op & open abd wound healing    Calories: 1237-8223 kcals based on 30-40 kcals/kg  Protein: 129.6-172.8 g protein based on 1.5-2.0g protein/kg + additional depending on outputs  *Fluid needs per team    Subjective:   78 M w/ Crohn's, AFib/Flutter s/p DCCVs on amiodarone, remote ileocectomy and open appendectomy. Admitted () for SBO vs Crohns flare, s/p NGT decompression and s/p lap TRE converted to open TRE, SBR x 3, left in discontinuity with abthera vac on (), RTOR for ileocolic resection, small bowel anastomosis, and abdominal wall closure on (), c/b fluid collection s/p IR aspiration of perihepatic fluid on (7/3), c/b wound dehiscence s/p RTOR exlap, washout, ileocolic resection with end ileostomy, blow hole colostomy, red rubber from ileostomy to small bowel anastomosis; vicryl bridging mesh on () transferred to SICU postoperatively for hemodynamic monitoring, with hospital course complicated by periods of AMS most recently on  and associated with oliguria, severe ELVI and hyponatremia, which resolved but stepped back up for to SICU on 9/10 for acute AMS, intermittent hypoglycemia, AFib with RVR. Percutaneous Cholecystostomy placed on  for enlarged GB, PCT capped 10/5.    Chart reviewed. Pt seen sleeping on 5EA, discussed with IDT during AM rounds. Labs & Rx reviewed. TPN via PICC remains primary means to nutrition. 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 Continues to receive lipids in TPN daily. Latest selenium 75 <L>, copper 12 <L> []. To increase selenium in TPN and provide copper daily, monitor & recheck. Total bili 6.4 <H>, direct bilirubin 4.1 <H>,  []. Pt continues to receive sublingual ergocalciferol in additional to weekly ergocalciferol supplementation. TPN reordered for tonight. RDN will continue to monitor, reassess, and intervene as appropriate.     Previous Nutrition Diagnosis:  Increased Nutrient Needs R/T physiological demands for nutrient AEB post-op wound healing    Active [ x  ]  Resolved [   ]    If resolved, new PES:     Goal:  Pt will meet at least 75% of protein & energy needs via most appropriate route for nutrition     Recommendations:  1. c/w TPN via PICC as primary means to nutrition - recommend 1.9L bag running over 14hrs (135ml/hr), providing 380g Dex, 120g AA, 50g SMOF lipids daily; provides 2272 kcals, 120g protein daily, GIR 4.86 mg/kg/min   - provides 26.3 kcals/kg, 20.7 non-protein kcals/kg, 1.4g protein/kg  - monitor weights & wound healing, adjust substrates as indicated  - fluid & lytes per team  - MVI, thiamine, vit C in bag  2. Increase selenium in TPN, provide copper daily  - monitor zinc levels and adjust prn  - recheck levels in 4 weeks  - c/w Vit D supplementation and recheck levels in 4 wks  3. Recommend weekly weights for trending/ensuring adequacy of nutrition provision  - please obtain new weight  4. PO diet per team discretion  -  c/w Regular diet as medically feasible to allow for more menu options  - c/w 1 LPS BID [200 kcals, 30g protein] + 1 Ensure Max daily [150 kcals, 30g protein] as amenable  - consider NPO for bowel rest as indicated   - ongoing education prn  5. Hydration per team  - risk for dehydration with high outputs, monitor  - additional fluids per team  - consider oral rehydration solution  6. Weekly lipid panel while on TPN  - hold/decrease lipids if TG >400  - continue to trend LFTs  7. Monitor BMP/Mg/Phos, POC BG while on TPN  - monitor & replenish lytes outside TPN bag prn  8. Continue close monitoring of clinical course & adjust recommendations prn    Education: ongoing diet education prn    Risk Level: High [  xx ] Moderate [   ] Low [   ] Admitting Diagnosis:   Patient is a 78y old  Male who presents with a chief complaint of SBO (29 Dec 2023 08:33)      PAST MEDICAL & SURGICAL HISTORY:  Essential hypertension  Hypertension      Atrial fibrillation  s/p cardioversion  and   Pt. reports 4 DCCV  Now on Amiodarone 200mg PO bid and Eliquis 5mg PO bid  Last DCCV 4 yrs ago at [facility]      Crohn's disease  s/p partial resection of ileum      Hyperlipidemia      Hypothyroidism      History of depression  On Venlafaxine ER 150mg PO bid      Junctional rhythm      H/O knee surgery      History of cataract surgery          Current Nutrition Order:   TPN via PICC- 1.9L bag running over 14hrs (135ml/hr), providing 380g Dex, 120g AA, 50g SMOF lipids daily; provides 2272 kcals, 120g protein daily, GIR 4.86 mg/kg/min   PO- Regular diet + 2 LPS per day [provide 100 kcals, 15g protein each], + Ensure Max daily [150 kcals, 20g protein]    PO Intake: Good (%) [   ]  Fair (50-75%) [ x ] Poor (<25%) [   ]    GI Issues:   : ileostomy output 50cc x 24hrs, abdominal wound/fistula output 910cc x 24hrs, per cony output 1225cc x 24hrs    : ileostomy output 50cc x 24hrs, abdominal wound/fistula output 1175cc x 24hrs, per cony output 1050cc x 24hrs    : ileostomy output 50cc x 24hrs, abdominal wound/fistula output 1100cc x 24hrs, per cony output 1050cc x 24hrs       : ileostomy output 20cc x 24hrs, abdominal wound/fistula output 1275cc x 24hrs, per cony output 775cc x 24hrs      : ileostomy output 15cc x 24hrs, abdominal wound/fistula output  1230cc x 24hrs, per cony output 1010cc x 24hrs      : ileostomy output 75cc x 24hrs, abdominal wound/fistula output  600cc x 24hrs, per cony output 1125cc x 24hrs      : ileostomy output 35cc x 24hrs, abdominal wound/fistula output  250cc x 24hrs, per cony output 745cc x 24hrs      : ileostomy output 30cc x 24hrs, abdominal wound/fistula output  2400cc x 24hrs, per cony output 550cc x 24hrs     : ileostomy output 50 cc x 24hrs, abdominal wound/fistula output 2175 cc x 24hrs, per cony output 530 cc x 24hrs     : ileostomy output 25cc x 24hrs, abdominal wound/fistula output 2350 cc x 24hrs, per cony output 775cc x 24hrs     : ileostomy output 55cc x 24hrs, abdominal wound/fistula output 2025 cc x 24hrs, per cony output 450cc x 24hrs     11/15:  ileostomy output 65cc x 24hrs, abdominal wound/fistula output 875cc x 24hrs, per cony output 775cc x 24hrs    Pain: no pain/discomfort noted    Skin Integrity: lower back wound/healed, jaundice, abd wound and fistula with wound manager in place, RLQ ileostomy, perc cony drain, Rudy score 19    Labs:       135  |  102  |  45<H>  ----------------------------<  127<H>  4.3   |  28  |  1.62<H>    Ca    8.6      28 Dec 2023 04:37  Phos  3.5       Mg     2.2         TPro  8.3  /  Alb  2.2<L>  /  TBili  6.4<H>  /  DBili  4.1<H>  /  AST  111<H>  /  ALT  72<H>  /  AlkPhos  118      CAPILLARY BLOOD GLUCOSE          Medications:  MEDICATIONS  (STANDING):  chlorhexidine 2% Cloths 1 Application(s) Topical <User Schedule>  cholestyramine Powder (Sugar-Free) 4 Gram(s) Oral daily  epoetin matt-epbx (RETACRIT) Injectable 76532 Unit(s) SubCutaneous every 7 days  ergocalciferol 41812 Unit(s) Oral <User Schedule>  gabapentin 100 milliGRAM(s) Oral at bedtime  glucagon  Injectable 1 milliGRAM(s) IntraMuscular once  labetalol Injectable 10 milliGRAM(s) IV Push every 6 hours  levothyroxine Injectable 30 MICROGram(s) IV Push at bedtime  lipid, fat emulsion (Fish Oil and Plant Based) 20% Infusion 0.54 Gm/kG/Day (20.83 mL/Hr) IV Continuous <Continuous>  Parenteral Nutrition - Adult 1 Each TPN Continuous <Continuous>  Parenteral Nutrition - Adult 1 Each TPN Continuous <Continuous>  silver nitrate Applicator 1 Application(s) Topical once  ursodiol Suspension 300 milliGRAM(s) Oral every 8 hours  venlafaxine XR. 150 milliGRAM(s) Oral daily    MEDICATIONS  (PRN):  chlorhexidine 0.12% Liquid 15 milliLiter(s) Swish and Spit two times a day PRN oral hygeine  hydrALAZINE Injectable 10 milliGRAM(s) IV Push every 6 hours PRN SBP >170  lidocaine 2% Viscous 10 milliLiter(s) Swish and Spit every 12 hours PRN tongue pain  LORazepam   Injectable 1 milliGRAM(s) IV Push <User Schedule> PRN Anxiety  ondansetron Injectable 4 milliGRAM(s) IV Push every 6 hours PRN Nausea and/or Vomiting      Daily Weight in k.3kg [19 Dec 2023]  Daily 93.3kg [11 Dec 2023]  Daily 93.3kg [08 Dec 2023]  Daily 93.3kg [6 Dec 2023]  Daily 94.2kg [2023]  Daily 94.4kg [2023]  Daily 94.2kg [2023]  Daily 94.2kg [2023]  Daily 94.2kg [2023]  Daily 94.2 [15 Nov 2023]  Daily 94.2kg [2023]  Daily 95.8kg [2023]  Daily 94.2kg [2023]  Daily 85.2kg [2023]  Daily 85.2kg [31 Oct 2023]  Daily 85.2kg [24 Oct 2023]  Daily 85.2kg [20 Oct 2023]  Daily 81.9kg [18 Oct 2023]  Daily 85.2kg [16 Oct 2023]  Daily   95.2kg [12 Oct 2023]   Daily 87.7kg [11 Oct 2023]  Daily 87.4kg [09 Oct 2023]  Daily 86.4kg [07 Oct 2023]  Daily 82.5kg [06 Oct 2023]  Daily 82.6kg [4 Oct 2023]   Daily 83.5kg [03 Oct 2023]  Daily 84.5kg [2 Oct 2023]  Daily 87.2kg [1 Oct 2023]  Daily 88.3kg [29 Sep 2023]  Daily 89.9kg [28 Sep 2023]  Daily 87.7kg [24 Sep 2023]  Daily 90.4kg [21 Sep 2023]  Daily 91.4kg [20 Sep 2023]  Daily 86.7kg [18 Sep 2023]  Daily 88.1kg [16 Sep 2023]  Daily 88.9kg [15 Sep 2023]   Daily 89.1 [14 Sep 2023]  Daily 92.9kg [6 Sep 2023]  Daily 91.8kg [27 Aug 2023]  Daily 101kg [9 Aug 2023]     Weight Change: weight trending down throughout admission, now appears to be trending back up. Recommend continue weekly/daily weights for trending & ensuring adequacy of nutrition provision    IBW: 86.4kg  % IBW: 108.0    Estimated energy needs:   Ideal body weight (86.4kg) used for calculations as pt >100% IBW and BMI <30 per [facility] Standards of Care. Needs estimated for age and adjusted for current clinical status, increased needs for post-op & open abd wound healing    Calories: 3488-1845 kcals based on 30-40 kcals/kg  Protein: 129.6-172.8 g protein based on 1.5-2.0g protein/kg + additional depending on outputs  *Fluid needs per team    Subjective:   78 M w/ Crohn's, AFib/Flutter s/p DCCVs on amiodarone, remote ileocectomy and open appendectomy. Admitted () for SBO vs Crohns flare, s/p NGT decompression and s/p lap TRE converted to open TRE, SBR x 3, left in discontinuity with abthera vac on (), RTOR for ileocolic resection, small bowel anastomosis, and abdominal wall closure on (), c/b fluid collection s/p IR aspiration of perihepatic fluid on (7/3), c/b wound dehiscence s/p RTOR exlap, washout, ileocolic resection with end ileostomy, blow hole colostomy, red rubber from ileostomy to small bowel anastomosis; vicryl bridging mesh on () transferred to SICU postoperatively for hemodynamic monitoring, with hospital course complicated by periods of AMS most recently on  and associated with oliguria, severe ELVI and hyponatremia, which resolved but stepped back up for to SICU on 9/10 for acute AMS, intermittent hypoglycemia, AFib with RVR. Percutaneous Cholecystostomy placed on  for enlarged GB, PCT capped 10/5.    Chart reviewed. Pt seen sleeping on 5EA, discussed with IDT during AM rounds. Labs & Rx reviewed. TPN via PICC remains primary means to nutrition. 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 Continues to receive lipids in TPN daily. Latest selenium 75 <L>, copper 12 <L> []. To increase selenium in TPN and provide copper daily, monitor & recheck. Total bili 6.4 <H>, direct bilirubin 4.1 <H>,  []. Pt continues to receive sublingual ergocalciferol in additional to weekly ergocalciferol supplementation. TPN reordered for tonight. RDN will continue to monitor, reassess, and intervene as appropriate.     Previous Nutrition Diagnosis:  Increased Nutrient Needs R/T physiological demands for nutrient AEB post-op wound healing    Active [ x  ]  Resolved [   ]    If resolved, new PES:     Goal:  Pt will meet at least 75% of protein & energy needs via most appropriate route for nutrition     Recommendations:  1. c/w TPN via PICC as primary means to nutrition - recommend 1.9L bag running over 14hrs (135ml/hr), providing 380g Dex, 120g AA, 50g SMOF lipids daily; provides 2272 kcals, 120g protein daily, GIR 4.86 mg/kg/min   - provides 26.3 kcals/kg, 20.7 non-protein kcals/kg, 1.4g protein/kg  - monitor weights & wound healing, adjust substrates as indicated  - fluid & lytes per team  - MVI, thiamine, vit C in bag  2. Increase selenium in TPN, provide copper daily  - monitor zinc levels and adjust prn  - recheck levels in 4 weeks  - c/w Vit D supplementation and recheck levels in 4 wks  3. Recommend weekly weights for trending/ensuring adequacy of nutrition provision  - please obtain new weight  4. PO diet per team discretion  -  c/w Regular diet as medically feasible to allow for more menu options  - c/w 1 LPS BID [200 kcals, 30g protein] + 1 Ensure Max daily [150 kcals, 30g protein] as amenable  - consider NPO for bowel rest as indicated   - ongoing education prn  5. Hydration per team  - risk for dehydration with high outputs, monitor  - additional fluids per team  - consider oral rehydration solution  6. Weekly lipid panel while on TPN  - hold/decrease lipids if TG >400  - continue to trend LFTs  7. Monitor BMP/Mg/Phos, POC BG while on TPN  - monitor & replenish lytes outside TPN bag prn  8. Continue close monitoring of clinical course & adjust recommendations prn    Education: ongoing diet education prn    Risk Level: High [  xx ] Moderate [   ] Low [   ]

## 2023-12-29 NOTE — PROVIDER CONTACT NOTE (CHANGE IN STATUS NOTIFICATION) - ASSESSMENT
Pt. asymptomatic and asleep. Given standing Labetalol 10mg IVP and prn Ativan pushes 0.5mg x 2, see MAR.
Pt. AO x 3, asymptomatic.
Dustin pouch noted to be oozing blood around site. oozing blood from site.

## 2023-12-29 NOTE — PROGRESS NOTE ADULT - ASSESSMENT
78M w PMH Crohn's, AFib/Flutter s/p DCCVs on amiodarone, remote ileocectomy and open appendectomy. Admitted (6/23) for SBO vs Crohns flare, s/p NGT decompression and s/p lap converted to open TRE, SBR x 3, left in discontinuity with abthera vac on (6/27), RTOR for ileocolic resection, small bowel anastomosis, and abdominal wall closure on (6/28), c/b fluid collection s/p IR aspiration of perihepatic fluid on (7/3), c/b wound dehiscence s/p RTOR exlap, washout, ileocolic resection with end ileostomy, blow hole colostomy, red rubber from ileostomy to small bowel anastomosis; vicryl bridging mesh on (7/5) transferred to SICU postoperatively for hemodynamic monitoring, with hospital course complicated by periods of severe ELVI and hyponatremia, which resolved but stepped back up for to SICU on (9/10) for acute AMS, intermittent hypoglycemia, AFib with RVR. Percutaneous Cholecystostomy placed on (9/11) for enlarged GB, PCT capped 10/5 and uncapped 10/25 for hyperbilirubinemia, Right brachial DVT, left basillic and cephalic superficial thrombus on duplex 11/2, CT 11/14 with ALDEN ground glass opacity of unclear etiology, completed empiric 7day cefepime course 11/22, rising T-bili of unclear etilogy, now w resolved candida glabrata fungemia, ELVI resolving.    S/p 1 PRBC 12/29, f/u CBC     Trend Cr, f/u Nephrology w/ addl recs   cw diet as tolerated   f/u hepatology recommendations   Wound care as per ostomy nurses   Continue to watch hydration status and repleting/restricting  continue with PT   rest of care per SICU, appreciate excellent care by SICU team

## 2023-12-29 NOTE — PROGRESS NOTE ADULT - SUBJECTIVE AND OBJECTIVE BOX
Interval Events:  Wound bleeding overnight therefore reinforced with surgicel.   Patient seen and examined at bedside.      Allergies    penicillin (Angioedema)    Intolerances        Vital Signs Last 24 Hrs  T(C): 36.4 (29 Dec 2023 05:01), Max: 36.7 (28 Dec 2023 17:57)  T(F): 97.6 (29 Dec 2023 05:01), Max: 98.1 (28 Dec 2023 17:57)  HR: 90 (29 Dec 2023 08:13) (76 - 92)  BP: 163/74 (29 Dec 2023 07:07) (117/58 - 163/74)  BP(mean): 106 (29 Dec 2023 07:07) (84 - 106)  RR: 24 (29 Dec 2023 08:13) (17 - 32)  SpO2: 100% (29 Dec 2023 08:13) (93% - 100%)    Parameters below as of 29 Dec 2023 08:13  Patient On (Oxygen Delivery Method): BiPAP/CPAP    O2 Concentration (%): 40    12-28 @ 07:01  -  12-29 @ 07:00  --------------------------------------------------------  IN: 3467.2 mL / OUT: 3660 mL / NET: -192.8 mL    12-29 @ 07:01  -  12-29 @ 08:33  --------------------------------------------------------  IN: 122 mL / OUT: 0 mL / NET: 122 mL      12-28 @ 07:01  -  12-29 @ 07:00  --------------------------------------------------------  IN: 3467.2 mL / OUT: 3660 mL / NET: -192.8 mL    12-29 @ 07:01  -  12-29 @ 08:33  --------------------------------------------------------  IN: 122 mL / OUT: 0 mL / NET: 122 mL        Physical Exam:     Neurological: AAOx3, CNII-XII intact,  strength 5/5 b/l  ENT: mucus membrane moist  Cardiovascular: RRR  Respiratory: CTA  Gastrointestinal: soft, NT, ND, BS+, fistula thickish yellowish formed output, no bleeding. surgicel with overlying ABD dressing intact. perc deb bilious,  Extremities: warm, no dependent edema  Vascular: no cyanosis/erythema  Skin: no rashes  MSK: no joint swelling.  Psych: No signs of anxiety or depression      LABS:      CBC Full  -  ( 28 Dec 2023 04:37 )  WBC Count : 7.46 K/uL  RBC Count : 2.49 M/uL  Hemoglobin : 7.8 g/dL  Hematocrit : 24.0 %  Platelet Count - Automated : 212 K/uL  Mean Cell Volume : 96.4 fl  Mean Cell Hemoglobin : 31.3 pg  Mean Cell Hemoglobin Concentration : 32.5 gm/dL  Auto Neutrophil # : 4.88 K/uL  Auto Lymphocyte # : 1.00 K/uL  Auto Monocyte # : 0.99 K/uL  Auto Eosinophil # : 0.47 K/uL  Auto Basophil # : 0.09 K/uL  Auto Neutrophil % : 65.4 %  Auto Lymphocyte % : 13.4 %  Auto Monocyte % : 13.3 %  Auto Eosinophil % : 6.3 %  Auto Basophil % : 1.2 %    12-28    135  |  102  |  45<H>  ----------------------------<  127<H>  4.3   |  28  |  1.62<H>    Ca    8.6      28 Dec 2023 04:37  Phos  3.5     12-28  Mg     2.2     12-28    TPro  8.3  /  Alb  2.2<L>  /  TBili  6.4<H>  /  DBili  4.1<H>  /  AST  111<H>  /  ALT  72<H>  /  AlkPhos  118  12-28    PT/INR - ( 28 Dec 2023 14:36 )   PT: 15.0 sec;   INR: 1.33          PTT - ( 28 Dec 2023 14:36 )  PTT:36.3 sec      Urinalysis Basic - ( 28 Dec 2023 04:37 )    Color: x / Appearance: x / SG: x / pH: x  Gluc: 127 mg/dL / Ketone: x  / Bili: x / Urobili: x   Blood: x / Protein: x / Nitrite: x   Leuk Esterase: x / RBC: x / WBC x   Sq Epi: x / Non Sq Epi: x / Bacteria: x              RADIOLOGY & ADDITIONAL STUDIES (The following images were personally reviewed):          A/p:78M w PMH Crohn's, AFib/Flutter s/p DCCVs on amiodarone, remote ileocectomy and open appendectomy. Admitted (6/23) for SBO vs Crohns flare, s/p NGT decompression and s/p lap converted to open TRE, SBR x 3, left in discontinuity with abthera vac on (6/27), RTOR for ileocolic resection, small bowel anastomosis, and abdominal wall closure on (6/28), c/b fluid collection s/p IR aspiration of perihepatic fluid on (7/3), c/b wound dehiscence s/p RTOR exlap, washout, ileocolic resection with end ileostomy, blow hole colostomy, red rubber from ileostomy to small bowel anastomosis; vicryl bridging mesh on (7/5) transferred to SICU postoperatively for hemodynamic monitoring, with hospital course complicated by periods of severe ELVI and hyponatremia, which resolved but stepped back up for to SICU on (9/10) for acute AMS, intermittent hypoglycemia, AFib with RVR. Percutaneous Cholecystostomy placed on (9/11) for enlarged GB, PCT capped 10/5 and uncapped 10/25 for hyperbilirubinemia, Right brachial DVT, left basillic and cephalic superficial thrombus on duplex 11/2, CT 11/14 with ALDEN ground glass opacity of unclear etiology, completed empiric 7day cefepime course 11/22, rising T-bili of unclear etilogy, now w resolved candida glabrata fungemia, ELVI improving.     NEURO: Hx of depression: cont Effexor (halved dose in setting of renal dysfunx). Anxiety: Lorazepam PRN. Holistic consult for anxiety and insomnia.   CV: Hx Afib/flutter: s/p DCCV, off Amio and lipitor due to persistent transaminitis, continue labetalol IV 10q6h. TTE  (7/18) - PASP 64mmHg, EF 65-70%. holding Losartan & norvasc because not absorbing PO meds, hydralazine PRN.   PULM: Atelectasis/dyspnea improved clinically: cont 1 hr of BiPAP every 12hrs of nasal canula or room air. Encourage OOB and IS.  CT from 11/14 with ALDEN ground glass opacity of unclear etiology. COVID negative. RVP negative. completed 7d empiric cefepime 11/22 to cover for potential PNA.   GI/FEN: Low res, low fat, high protein diet. Cont Ensure max x1/day. Folate, thiamine. PICC replaced 12/4, will switch out PICC on 1/4. continue TPN/lipids. High output EC fistula: cont Octreotide & Cholestyramine 10/18. Transaminitis elevated T bili of unclear origin, s/p Perc Deb on 9/11, failed capping trial; seen by Hepatology - MRCP 10/30 no obstruction, cont ursodiol, RUQ US 11/25 CBD 5mm, hepatic vessels patent  : Voids. ELVI improved: stopped famotidine given renal dysfunction. MARLO Duplex and RP US unremarkable, CK wnl.    ENDO: Hx hypothyroid: IV Synthroid, TSH low on 11/30, decreased synthroid dose, repeat TSH 12/6 wnl.  ID: currently not on any abx/antifungals. BCx 11/22 grew candida glabrata, likely CLABSI. s/p Caspo & completed Fluconazole course (12/1-12/9). Ophthalmology evaluated - normal ocular exam. Echo no vegetation. PICC replaced on 12/4. Cont Nystatin powder. // DC: caspofungin (11/24-12/1). empiric 7days cefepime (11/15-11/22), C. tertium, Lactobacillis from IR cx 7/3, and candida albicans, lactobacillus, vanc sensitive E faecium, vanc resistant E gallinarum, vanc resistant E casseliflavus, lactobacillus from OR cx 7/5. Completed course of abx with Imipenem (9/10-9/15). Imipenem (8/26--) imipenem (6/30-7/12, 7/23-7/30), Dapto (6/30-7/5 and 7/23-7/24). Empiric dapto (8/23-24) and cefepime (8/23-24).   PPX: SCDs, SQH, was on Therapeutic lovenox bid for R brachial vein DVT, but will hold off on anticoagulation since repeat US Duplex negative.  HEME: Anemia - continue Epogen weekly. S/p Iron Sucrose 200 qd x 3 days for chronic anemia. transfuse 1U PRBC for symptomatic anemia  LINES: Blair, ZAHEER PICC (12/4 - ) will plan to switch PICC once a month (around 1/4) // DC: LUE PICC (9/1-11/1 ), RUE PICC: (11/1-11/24)   WOUNDS/DRAINS: Abdominal wound and fistula with wound manager in place dressing change Tuesday and Friday, had punctate bleeding at wound bed, s/p placed surgicell over - not bleeding at this time. RLQ Ostomy. IR perc deb tube. // DC: L Clay drain.  PT: Ambulate as tolerated OOB to chair daily.  DISPO: SICU due to complicated hospital course.

## 2023-12-29 NOTE — ADVANCED PRACTICE NURSE CONSULT - ASSESSMENT
Appliance began leaking due to bleeding areas scattered over denuded areas at perifistular site. Surgicel applied over those areas then surgicel covered with hydrocolloid. Denuded area protected with stoma powder and sealed with Cavilon barrier wipes, repeated 3x. External fecal pouch applied around fistula. New Coloplast soft convex 1 piece appliance placed, unable to insert red rubber catheter into stoma, team made aware.  Lot #: [id number] Lot #: [id number]

## 2023-12-29 NOTE — PROGRESS NOTE ADULT - SUBJECTIVE AND OBJECTIVE BOX
Colorectal Surgery   (covering for Dr. Peterson)  5 ICU  TPN    Eating well.  Stoma output continues  Bleeding episode today.  Currently stopped.  Ambulating    Vital Signs Last 24 Hrs  T(C): 36.6 (29 Dec 2023 14:00), Max: 36.7 (28 Dec 2023 17:57)  T(F): 97.9 (29 Dec 2023 14:00), Max: 98.1 (28 Dec 2023 17:57)  HR: 79 (29 Dec 2023 15:00) (76 - 98)  BP: 126/59 (29 Dec 2023 15:00) (111/53 - 163/74)  BP(mean): 85 (29 Dec 2023 15:00) (76 - 106)  RR: 30 (29 Dec 2023 15:00) (20 - 45)  SpO2: 96% (29 Dec 2023 15:00) (93% - 100%)    Parameters below as of 29 Dec 2023 15:00  Patient On (Oxygen Delivery Method): room air    abd: benign, fistula without change, RUQ stoma site without red rubber catheter  cony drain in place    ext: without calf tenderness    UO: 950 ml/overnight shift;  1,475 ml/24 hours;  this shift 1,600 ml  fistula output: 210 ml/night shift until 7 AM; 910 ml/24 hrs; 200 ml this shift  cony:  700 ml/night shift; 1,225 ml/24 hours;  200 ml/shift                          7.8    7.46  )-----------( 212      ( 28 Dec 2023 04:37 )             24.0   12-28    135  |  102  |  45<H>  ----------------------------<  127<H>  4.3   |  28  |  1.62<H>    Ca    8.6      28 Dec 2023 04:37  Phos  3.5     12-28  Mg     2.2     12-28    TPro  8.3  /  Alb  2.2<L>  /  TBili  6.4<H>  /  DBili  4.1<H>  /  AST  111<H>  /  ALT  72<H>  /  AlkPhos  118  12-28

## 2023-12-29 NOTE — PROGRESS NOTE ADULT - SUBJECTIVE AND OBJECTIVE BOX
SUBJECTIVE:  No acute events overnight   This morning, he feels well; his pain is well-controlled. No nausea or vomiting.  No acute complaints.  S/p 1 PRBC 12/28 for symptomatic anemia  Some bleeding from granulating midline wound 12/28    MEDICATIONS  (STANDING):  chlorhexidine 2% Cloths 1 Application(s) Topical <User Schedule>  cholestyramine Powder (Sugar-Free) 4 Gram(s) Oral daily  epoetin matt-epbx (RETACRIT) Injectable 96331 Unit(s) SubCutaneous every 7 days  ergocalciferol 24429 Unit(s) Oral <User Schedule>  gabapentin 100 milliGRAM(s) Oral at bedtime  glucagon  Injectable 1 milliGRAM(s) IntraMuscular once  labetalol Injectable 10 milliGRAM(s) IV Push every 6 hours  levothyroxine Injectable 30 MICROGram(s) IV Push at bedtime  lipid, fat emulsion (Fish Oil and Plant Based) 20% Infusion 0.54 Gm/kG/Day (20.83 mL/Hr) IV Continuous <Continuous>  Parenteral Nutrition - Adult 1 Each TPN Continuous <Continuous>  silver nitrate Applicator 1 Application(s) Topical once  ursodiol Suspension 300 milliGRAM(s) Oral every 8 hours  venlafaxine XR. 150 milliGRAM(s) Oral daily    MEDICATIONS  (PRN):  hydrALAZINE Injectable 10 milliGRAM(s) IV Push every 6 hours PRN SBP >170  lidocaine 2% Viscous 10 milliLiter(s) Swish and Spit every 12 hours PRN tongue pain  LORazepam   Injectable 1 milliGRAM(s) IV Push <User Schedule> PRN Anxiety  ondansetron Injectable 4 milliGRAM(s) IV Push every 6 hours PRN Nausea and/or Vomiting      Vital Signs Last 24 Hrs  T(C): 36.4 (29 Dec 2023 05:01), Max: 36.7 (28 Dec 2023 17:57)  T(F): 97.6 (29 Dec 2023 05:01), Max: 98.1 (28 Dec 2023 17:57)  HR: 90 (29 Dec 2023 07:07) (76 - 92)  BP: 163/74 (29 Dec 2023 07:07) (117/58 - 163/74)  BP(mean): 106 (29 Dec 2023 07:07) (84 - 106)  RR: 23 (29 Dec 2023 07:07) (17 - 32)  SpO2: 96% (29 Dec 2023 07:07) (93% - 100%)    Parameters below as of 29 Dec 2023 07:07  Patient On (Oxygen Delivery Method): room air    Physical Exam:  Gen: NAD  ENT: mucus membrane moist  Cardiovascular: RRR  Respiratory: CTA  Gastrointestinal: soft, NT, ND, BS+, fistula thickish yellowish formed output, wound manager in place, no bleeding noted. perc cony bilious.  Extremities: warm, no dependent edema  Neurological: AAOx3, CNII-XII intact,  strength 5/5 b/lI&O's Summary    I&O's Summary    28 Dec 2023 07:01  -  29 Dec 2023 07:00  --------------------------------------------------------  IN: 3467.2 mL / OUT: 3660 mL / NET: -192.8 mL    29 Dec 2023 07:01  -  29 Dec 2023 07:59  --------------------------------------------------------  IN: 122 mL / OUT: 0 mL / NET: 122 mL        LABS:                        7.8    7.46  )-----------( 212      ( 28 Dec 2023 04:37 )             24.0     12-28    135  |  102  |  45<H>  ----------------------------<  127<H>  4.3   |  28  |  1.62<H>    Ca    8.6      28 Dec 2023 04:37  Phos  3.5     12-28  Mg     2.2     12-28    TPro  8.3  /  Alb  2.2<L>  /  TBili  6.4<H>  /  DBili  4.1<H>  /  AST  111<H>  /  ALT  72<H>  /  AlkPhos  118  12-28    PT/INR - ( 28 Dec 2023 14:36 )   PT: 15.0 sec;   INR: 1.33          PTT - ( 28 Dec 2023 14:36 )  PTT:36.3 sec  Urinalysis Basic - ( 28 Dec 2023 04:37 )    Color: x / Appearance: x / SG: x / pH: x  Gluc: 127 mg/dL / Ketone: x  / Bili: x / Urobili: x   Blood: x / Protein: x / Nitrite: x   Leuk Esterase: x / RBC: x / WBC x   Sq Epi: x / Non Sq Epi: x / Bacteria: x      CAPILLARY BLOOD GLUCOSE        LIVER FUNCTIONS - ( 28 Dec 2023 04:37 )  Alb: 2.2 g/dL / Pro: 8.3 g/dL / ALK PHOS: 118 U/L / ALT: 72 U/L / AST: 111 U/L / GGT: x             RADIOLOGY & ADDITIONAL STUDIES:   SUBJECTIVE:  No acute events overnight   This morning, he feels well; his pain is well-controlled. No nausea or vomiting.  No acute complaints.  S/p 1 PRBC 12/28 for symptomatic anemia  Some bleeding from granulating midline wound 12/28    MEDICATIONS  (STANDING):  chlorhexidine 2% Cloths 1 Application(s) Topical <User Schedule>  cholestyramine Powder (Sugar-Free) 4 Gram(s) Oral daily  epoetin matt-epbx (RETACRIT) Injectable 12638 Unit(s) SubCutaneous every 7 days  ergocalciferol 02156 Unit(s) Oral <User Schedule>  gabapentin 100 milliGRAM(s) Oral at bedtime  glucagon  Injectable 1 milliGRAM(s) IntraMuscular once  labetalol Injectable 10 milliGRAM(s) IV Push every 6 hours  levothyroxine Injectable 30 MICROGram(s) IV Push at bedtime  lipid, fat emulsion (Fish Oil and Plant Based) 20% Infusion 0.54 Gm/kG/Day (20.83 mL/Hr) IV Continuous <Continuous>  Parenteral Nutrition - Adult 1 Each TPN Continuous <Continuous>  silver nitrate Applicator 1 Application(s) Topical once  ursodiol Suspension 300 milliGRAM(s) Oral every 8 hours  venlafaxine XR. 150 milliGRAM(s) Oral daily    MEDICATIONS  (PRN):  hydrALAZINE Injectable 10 milliGRAM(s) IV Push every 6 hours PRN SBP >170  lidocaine 2% Viscous 10 milliLiter(s) Swish and Spit every 12 hours PRN tongue pain  LORazepam   Injectable 1 milliGRAM(s) IV Push <User Schedule> PRN Anxiety  ondansetron Injectable 4 milliGRAM(s) IV Push every 6 hours PRN Nausea and/or Vomiting      Vital Signs Last 24 Hrs  T(C): 36.4 (29 Dec 2023 05:01), Max: 36.7 (28 Dec 2023 17:57)  T(F): 97.6 (29 Dec 2023 05:01), Max: 98.1 (28 Dec 2023 17:57)  HR: 90 (29 Dec 2023 07:07) (76 - 92)  BP: 163/74 (29 Dec 2023 07:07) (117/58 - 163/74)  BP(mean): 106 (29 Dec 2023 07:07) (84 - 106)  RR: 23 (29 Dec 2023 07:07) (17 - 32)  SpO2: 96% (29 Dec 2023 07:07) (93% - 100%)    Parameters below as of 29 Dec 2023 07:07  Patient On (Oxygen Delivery Method): room air    Physical Exam:  Gen: NAD  ENT: mucus membrane moist  Cardiovascular: RRR  Respiratory: CTA  Gastrointestinal: soft, NT, ND, BS+, fistula thickish yellowish formed output, wound manager in place, no bleeding noted. perc cony bilious.  Extremities: warm, no dependent edema  Neurological: AAOx3, CNII-XII intact,  strength 5/5 b/lI&O's Summary    I&O's Summary    28 Dec 2023 07:01  -  29 Dec 2023 07:00  --------------------------------------------------------  IN: 3467.2 mL / OUT: 3660 mL / NET: -192.8 mL    29 Dec 2023 07:01  -  29 Dec 2023 07:59  --------------------------------------------------------  IN: 122 mL / OUT: 0 mL / NET: 122 mL        LABS:                        7.8    7.46  )-----------( 212      ( 28 Dec 2023 04:37 )             24.0     12-28    135  |  102  |  45<H>  ----------------------------<  127<H>  4.3   |  28  |  1.62<H>    Ca    8.6      28 Dec 2023 04:37  Phos  3.5     12-28  Mg     2.2     12-28    TPro  8.3  /  Alb  2.2<L>  /  TBili  6.4<H>  /  DBili  4.1<H>  /  AST  111<H>  /  ALT  72<H>  /  AlkPhos  118  12-28    PT/INR - ( 28 Dec 2023 14:36 )   PT: 15.0 sec;   INR: 1.33          PTT - ( 28 Dec 2023 14:36 )  PTT:36.3 sec  Urinalysis Basic - ( 28 Dec 2023 04:37 )    Color: x / Appearance: x / SG: x / pH: x  Gluc: 127 mg/dL / Ketone: x  / Bili: x / Urobili: x   Blood: x / Protein: x / Nitrite: x   Leuk Esterase: x / RBC: x / WBC x   Sq Epi: x / Non Sq Epi: x / Bacteria: x      CAPILLARY BLOOD GLUCOSE        LIVER FUNCTIONS - ( 28 Dec 2023 04:37 )  Alb: 2.2 g/dL / Pro: 8.3 g/dL / ALK PHOS: 118 U/L / ALT: 72 U/L / AST: 111 U/L / GGT: x             RADIOLOGY & ADDITIONAL STUDIES:

## 2023-12-29 NOTE — PROVIDER CONTACT NOTE (CHANGE IN STATUS NOTIFICATION) - NAME OF MD/NP/PA/DO NOTIFIED:
DIANE Shirley, MD Becerra, RUPA Campbell
James RUIZ
MD Becerra and RUPA Campbell
Tre Chadwick
Dr. Zhu

## 2023-12-29 NOTE — PROVIDER CONTACT NOTE (CHANGE IN STATUS NOTIFICATION) - BACKGROUND
Pt has crohns disease. Pt has abdominal drains.
78M w/ PMHx of Crohn's, HTN, HLD, AFib/Flutter s/p multiple DCCV (on Amio), Gout, PSHx R IHR, ileocecectomy, open appy presents for abdominal pain since last night. Pt states his pain is constant, severe pain diffusely. He states he has gotten this pain previously, though not as intensely that he has managed at home. Last BM and flatus was today. Denies n/v. Denies f/c. Denies cp/sob. Denies urinary complaints. Denies PO intake today. Pt was sent for a CT scan as an outpatient that showed distended loops of bowel with a possible transition point in the RLQ w/ fecalization of bowel contents.
78M w/ PMHx of Crohn's, HTN, HLD, AFib/Flutter s/p multiple DCCV (on Amio), Gout, PSHx R IHR, ileocecectomy, open appy presents for abdominal pain since last night. Pt states his pain is constant, severe pain diffusely. He states he has gotten this pain previously, though not as intensely that he has managed at home. Last BM and flatus was today. Denies n/v. Denies f/c. Denies cp/sob. Denies urinary complaints. Denies PO intake today. Pt was sent for a CT scan as an outpatient that showed distended loops of bowel with a possible transition point in the RLQ w/ fecalization of bowel contents.

## 2023-12-30 NOTE — PROGRESS NOTE ADULT - SUBJECTIVE AND OBJECTIVE BOX
SUBJECTIVE:      MEDICATIONS  (STANDING):  chlorhexidine 2% Cloths 1 Application(s) Topical <User Schedule>  cholestyramine Powder (Sugar-Free) 4 Gram(s) Oral daily  epoetin matt-epbx (RETACRIT) Injectable 72305 Unit(s) SubCutaneous every 7 days  ergocalciferol 78276 Unit(s) Oral <User Schedule>  gabapentin 100 milliGRAM(s) Oral at bedtime  glucagon  Injectable 1 milliGRAM(s) IntraMuscular once  labetalol Injectable 10 milliGRAM(s) IV Push every 6 hours  levothyroxine Injectable 30 MICROGram(s) IV Push at bedtime  lipid, fat emulsion (Fish Oil and Plant Based) 20% Infusion 0.54 Gm/kG/Day (20.83 mL/Hr) IV Continuous <Continuous>  Parenteral Nutrition - Adult 1 Each TPN Continuous <Continuous>  silver nitrate Applicator 1 Application(s) Topical once  ursodiol Suspension 300 milliGRAM(s) Oral every 8 hours  venlafaxine XR. 150 milliGRAM(s) Oral daily    MEDICATIONS  (PRN):  chlorhexidine 0.12% Liquid 15 milliLiter(s) Swish and Spit two times a day PRN oral hygeine  hydrALAZINE Injectable 10 milliGRAM(s) IV Push every 6 hours PRN SBP >170  lidocaine 2% Viscous 10 milliLiter(s) Swish and Spit every 12 hours PRN tongue pain  LORazepam   Injectable 1 milliGRAM(s) IV Push <User Schedule> PRN Anxiety  ondansetron Injectable 4 milliGRAM(s) IV Push every 6 hours PRN Nausea and/or Vomiting      Vital Signs Last 24 Hrs  T(C): 37.4 (30 Dec 2023 00:55), Max: 37.4 (30 Dec 2023 00:55)  T(F): 99.3 (30 Dec 2023 00:55), Max: 99.3 (30 Dec 2023 00:55)  HR: 91 (30 Dec 2023 06:00) (79 - 98)  BP: 148/65 (30 Dec 2023 06:00) (111/53 - 163/74)  BP(mean): 94 (30 Dec 2023 06:00) (76 - 106)  RR: 20 (30 Dec 2023 06:00) (20 - 45)  SpO2: 93% (30 Dec 2023 06:00) (91% - 100%)    Parameters below as of 30 Dec 2023 06:00  Patient On (Oxygen Delivery Method): room air        Physical Exam:  General: NAD, resting comfortably in bed  Pulmonary: Nonlabored breathing, no respiratory distress  Cardiovascular: NSR  Abdominal: soft, NT/ND  Extremities: WWP, normal strength  Neuro: A/O x 3, CNs II-XII grossly intact, no focal deficits    I&O's Summary    28 Dec 2023 07:01  -  29 Dec 2023 07:00  --------------------------------------------------------  IN: 3467.2 mL / OUT: 3660 mL / NET: -192.8 mL    29 Dec 2023 07:01  -  30 Dec 2023 06:44  --------------------------------------------------------  IN: 4068.2 mL / OUT: 4475 mL / NET: -406.8 mL        LABS:                        8.3    7.62  )-----------( 183      ( 30 Dec 2023 04:58 )             26.3     12-30    135  |  101  |  45<H>  ----------------------------<  132<H>  4.5   |  27  |  1.56<H>    Ca    8.5      30 Dec 2023 04:58  Phos  3.4     12-30  Mg     2.0     12-30    TPro  8.1  /  Alb  2.2<L>  /  TBili  6.0<H>  /  DBili  4.0<H>  /  AST  108<H>  /  ALT  68<H>  /  AlkPhos  120  12-30    PT/INR - ( 30 Dec 2023 04:58 )   PT: 14.8 sec;   INR: 1.31          PTT - ( 30 Dec 2023 04:58 )  PTT:35.2 sec  Urinalysis Basic - ( 30 Dec 2023 04:58 )    Color: x / Appearance: x / SG: x / pH: x  Gluc: 132 mg/dL / Ketone: x  / Bili: x / Urobili: x   Blood: x / Protein: x / Nitrite: x   Leuk Esterase: x / RBC: x / WBC x   Sq Epi: x / Non Sq Epi: x / Bacteria: x      CAPILLARY BLOOD GLUCOSE        LIVER FUNCTIONS - ( 30 Dec 2023 04:58 )  Alb: 2.2 g/dL / Pro: 8.1 g/dL / ALK PHOS: 120 U/L / ALT: 68 U/L / AST: 108 U/L / GGT: x             RADIOLOGY & ADDITIONAL STUDIES:   SUBJECTIVE:      MEDICATIONS  (STANDING):  chlorhexidine 2% Cloths 1 Application(s) Topical <User Schedule>  cholestyramine Powder (Sugar-Free) 4 Gram(s) Oral daily  epoetin matt-epbx (RETACRIT) Injectable 25346 Unit(s) SubCutaneous every 7 days  ergocalciferol 96005 Unit(s) Oral <User Schedule>  gabapentin 100 milliGRAM(s) Oral at bedtime  glucagon  Injectable 1 milliGRAM(s) IntraMuscular once  labetalol Injectable 10 milliGRAM(s) IV Push every 6 hours  levothyroxine Injectable 30 MICROGram(s) IV Push at bedtime  lipid, fat emulsion (Fish Oil and Plant Based) 20% Infusion 0.54 Gm/kG/Day (20.83 mL/Hr) IV Continuous <Continuous>  Parenteral Nutrition - Adult 1 Each TPN Continuous <Continuous>  silver nitrate Applicator 1 Application(s) Topical once  ursodiol Suspension 300 milliGRAM(s) Oral every 8 hours  venlafaxine XR. 150 milliGRAM(s) Oral daily    MEDICATIONS  (PRN):  chlorhexidine 0.12% Liquid 15 milliLiter(s) Swish and Spit two times a day PRN oral hygeine  hydrALAZINE Injectable 10 milliGRAM(s) IV Push every 6 hours PRN SBP >170  lidocaine 2% Viscous 10 milliLiter(s) Swish and Spit every 12 hours PRN tongue pain  LORazepam   Injectable 1 milliGRAM(s) IV Push <User Schedule> PRN Anxiety  ondansetron Injectable 4 milliGRAM(s) IV Push every 6 hours PRN Nausea and/or Vomiting      Vital Signs Last 24 Hrs  T(C): 37.4 (30 Dec 2023 00:55), Max: 37.4 (30 Dec 2023 00:55)  T(F): 99.3 (30 Dec 2023 00:55), Max: 99.3 (30 Dec 2023 00:55)  HR: 91 (30 Dec 2023 06:00) (79 - 98)  BP: 148/65 (30 Dec 2023 06:00) (111/53 - 163/74)  BP(mean): 94 (30 Dec 2023 06:00) (76 - 106)  RR: 20 (30 Dec 2023 06:00) (20 - 45)  SpO2: 93% (30 Dec 2023 06:00) (91% - 100%)    Parameters below as of 30 Dec 2023 06:00  Patient On (Oxygen Delivery Method): room air        Physical Exam:  General: NAD, resting comfortably in bed  Pulmonary: Nonlabored breathing, no respiratory distress  Cardiovascular: NSR  Abdominal: soft, NT/ND  Extremities: WWP, normal strength  Neuro: A/O x 3, CNs II-XII grossly intact, no focal deficits    I&O's Summary    28 Dec 2023 07:01  -  29 Dec 2023 07:00  --------------------------------------------------------  IN: 3467.2 mL / OUT: 3660 mL / NET: -192.8 mL    29 Dec 2023 07:01  -  30 Dec 2023 06:44  --------------------------------------------------------  IN: 4068.2 mL / OUT: 4475 mL / NET: -406.8 mL        LABS:                        8.3    7.62  )-----------( 183      ( 30 Dec 2023 04:58 )             26.3     12-30    135  |  101  |  45<H>  ----------------------------<  132<H>  4.5   |  27  |  1.56<H>    Ca    8.5      30 Dec 2023 04:58  Phos  3.4     12-30  Mg     2.0     12-30    TPro  8.1  /  Alb  2.2<L>  /  TBili  6.0<H>  /  DBili  4.0<H>  /  AST  108<H>  /  ALT  68<H>  /  AlkPhos  120  12-30    PT/INR - ( 30 Dec 2023 04:58 )   PT: 14.8 sec;   INR: 1.31          PTT - ( 30 Dec 2023 04:58 )  PTT:35.2 sec  Urinalysis Basic - ( 30 Dec 2023 04:58 )    Color: x / Appearance: x / SG: x / pH: x  Gluc: 132 mg/dL / Ketone: x  / Bili: x / Urobili: x   Blood: x / Protein: x / Nitrite: x   Leuk Esterase: x / RBC: x / WBC x   Sq Epi: x / Non Sq Epi: x / Bacteria: x      CAPILLARY BLOOD GLUCOSE        LIVER FUNCTIONS - ( 30 Dec 2023 04:58 )  Alb: 2.2 g/dL / Pro: 8.1 g/dL / ALK PHOS: 120 U/L / ALT: 68 U/L / AST: 108 U/L / GGT: x             RADIOLOGY & ADDITIONAL STUDIES:   SUBJECTIVE:  Pt seen at bedside. Pt endorsed some discomfort from leaking ostomy appliance but felt team was able to get a better seal o/n. No ongoing bleeding from wound bed.     MEDICATIONS  (STANDING):  chlorhexidine 2% Cloths 1 Application(s) Topical <User Schedule>  cholestyramine Powder (Sugar-Free) 4 Gram(s) Oral daily  epoetin matt-epbx (RETACRIT) Injectable 80781 Unit(s) SubCutaneous every 7 days  ergocalciferol 04211 Unit(s) Oral <User Schedule>  gabapentin 100 milliGRAM(s) Oral at bedtime  glucagon  Injectable 1 milliGRAM(s) IntraMuscular once  labetalol Injectable 10 milliGRAM(s) IV Push every 6 hours  levothyroxine Injectable 30 MICROGram(s) IV Push at bedtime  lipid, fat emulsion (Fish Oil and Plant Based) 20% Infusion 0.54 Gm/kG/Day (20.83 mL/Hr) IV Continuous <Continuous>  Parenteral Nutrition - Adult 1 Each TPN Continuous <Continuous>  silver nitrate Applicator 1 Application(s) Topical once  ursodiol Suspension 300 milliGRAM(s) Oral every 8 hours  venlafaxine XR. 150 milliGRAM(s) Oral daily    MEDICATIONS  (PRN):  chlorhexidine 0.12% Liquid 15 milliLiter(s) Swish and Spit two times a day PRN oral hygeine  hydrALAZINE Injectable 10 milliGRAM(s) IV Push every 6 hours PRN SBP >170  lidocaine 2% Viscous 10 milliLiter(s) Swish and Spit every 12 hours PRN tongue pain  LORazepam   Injectable 1 milliGRAM(s) IV Push <User Schedule> PRN Anxiety  ondansetron Injectable 4 milliGRAM(s) IV Push every 6 hours PRN Nausea and/or Vomiting      Vital Signs Last 24 Hrs  T(C): 37.4 (30 Dec 2023 00:55), Max: 37.4 (30 Dec 2023 00:55)  T(F): 99.3 (30 Dec 2023 00:55), Max: 99.3 (30 Dec 2023 00:55)  HR: 91 (30 Dec 2023 06:00) (79 - 98)  BP: 148/65 (30 Dec 2023 06:00) (111/53 - 163/74)  BP(mean): 94 (30 Dec 2023 06:00) (76 - 106)  RR: 20 (30 Dec 2023 06:00) (20 - 45)  SpO2: 93% (30 Dec 2023 06:00) (91% - 100%)    Parameters below as of 30 Dec 2023 06:00  Patient On (Oxygen Delivery Method): room air        Physical Exam:  General: NAD, resting comfortably in bed  Pulmonary: Nonlabored breathing, no respiratory distress  Cardiovascular: NSR  Abdominal: soft, NT/ND  Extremities: WWP, normal strength  Neuro: A/O x 3, CNs II-XII grossly intact, no focal deficits    I&O's Summary    28 Dec 2023 07:01  -  29 Dec 2023 07:00  --------------------------------------------------------  IN: 3467.2 mL / OUT: 3660 mL / NET: -192.8 mL    29 Dec 2023 07:01  -  30 Dec 2023 06:44  --------------------------------------------------------  IN: 4068.2 mL / OUT: 4475 mL / NET: -406.8 mL        LABS:                        8.3    7.62  )-----------( 183      ( 30 Dec 2023 04:58 )             26.3     12-30    135  |  101  |  45<H>  ----------------------------<  132<H>  4.5   |  27  |  1.56<H>    Ca    8.5      30 Dec 2023 04:58  Phos  3.4     12-30  Mg     2.0     12-30    TPro  8.1  /  Alb  2.2<L>  /  TBili  6.0<H>  /  DBili  4.0<H>  /  AST  108<H>  /  ALT  68<H>  /  AlkPhos  120  12-30    PT/INR - ( 30 Dec 2023 04:58 )   PT: 14.8 sec;   INR: 1.31          PTT - ( 30 Dec 2023 04:58 )  PTT:35.2 sec  Urinalysis Basic - ( 30 Dec 2023 04:58 )    Color: x / Appearance: x / SG: x / pH: x  Gluc: 132 mg/dL / Ketone: x  / Bili: x / Urobili: x   Blood: x / Protein: x / Nitrite: x   Leuk Esterase: x / RBC: x / WBC x   Sq Epi: x / Non Sq Epi: x / Bacteria: x      CAPILLARY BLOOD GLUCOSE        LIVER FUNCTIONS - ( 30 Dec 2023 04:58 )  Alb: 2.2 g/dL / Pro: 8.1 g/dL / ALK PHOS: 120 U/L / ALT: 68 U/L / AST: 108 U/L / GGT: x             RADIOLOGY & ADDITIONAL STUDIES:   SUBJECTIVE:  Pt seen at bedside. Pt endorsed some discomfort from leaking ostomy appliance but felt team was able to get a better seal o/n. No ongoing bleeding from wound bed.     MEDICATIONS  (STANDING):  chlorhexidine 2% Cloths 1 Application(s) Topical <User Schedule>  cholestyramine Powder (Sugar-Free) 4 Gram(s) Oral daily  epoetin matt-epbx (RETACRIT) Injectable 30868 Unit(s) SubCutaneous every 7 days  ergocalciferol 06884 Unit(s) Oral <User Schedule>  gabapentin 100 milliGRAM(s) Oral at bedtime  glucagon  Injectable 1 milliGRAM(s) IntraMuscular once  labetalol Injectable 10 milliGRAM(s) IV Push every 6 hours  levothyroxine Injectable 30 MICROGram(s) IV Push at bedtime  lipid, fat emulsion (Fish Oil and Plant Based) 20% Infusion 0.54 Gm/kG/Day (20.83 mL/Hr) IV Continuous <Continuous>  Parenteral Nutrition - Adult 1 Each TPN Continuous <Continuous>  silver nitrate Applicator 1 Application(s) Topical once  ursodiol Suspension 300 milliGRAM(s) Oral every 8 hours  venlafaxine XR. 150 milliGRAM(s) Oral daily    MEDICATIONS  (PRN):  chlorhexidine 0.12% Liquid 15 milliLiter(s) Swish and Spit two times a day PRN oral hygeine  hydrALAZINE Injectable 10 milliGRAM(s) IV Push every 6 hours PRN SBP >170  lidocaine 2% Viscous 10 milliLiter(s) Swish and Spit every 12 hours PRN tongue pain  LORazepam   Injectable 1 milliGRAM(s) IV Push <User Schedule> PRN Anxiety  ondansetron Injectable 4 milliGRAM(s) IV Push every 6 hours PRN Nausea and/or Vomiting      Vital Signs Last 24 Hrs  T(C): 37.4 (30 Dec 2023 00:55), Max: 37.4 (30 Dec 2023 00:55)  T(F): 99.3 (30 Dec 2023 00:55), Max: 99.3 (30 Dec 2023 00:55)  HR: 91 (30 Dec 2023 06:00) (79 - 98)  BP: 148/65 (30 Dec 2023 06:00) (111/53 - 163/74)  BP(mean): 94 (30 Dec 2023 06:00) (76 - 106)  RR: 20 (30 Dec 2023 06:00) (20 - 45)  SpO2: 93% (30 Dec 2023 06:00) (91% - 100%)    Parameters below as of 30 Dec 2023 06:00  Patient On (Oxygen Delivery Method): room air        Physical Exam:  General: NAD, resting comfortably in bed  Pulmonary: Nonlabored breathing, no respiratory distress  Cardiovascular: NSR  Abdominal: soft, NT/ND  Extremities: WWP, normal strength  Neuro: A/O x 3, CNs II-XII grossly intact, no focal deficits    I&O's Summary    28 Dec 2023 07:01  -  29 Dec 2023 07:00  --------------------------------------------------------  IN: 3467.2 mL / OUT: 3660 mL / NET: -192.8 mL    29 Dec 2023 07:01  -  30 Dec 2023 06:44  --------------------------------------------------------  IN: 4068.2 mL / OUT: 4475 mL / NET: -406.8 mL        LABS:                        8.3    7.62  )-----------( 183      ( 30 Dec 2023 04:58 )             26.3     12-30    135  |  101  |  45<H>  ----------------------------<  132<H>  4.5   |  27  |  1.56<H>    Ca    8.5      30 Dec 2023 04:58  Phos  3.4     12-30  Mg     2.0     12-30    TPro  8.1  /  Alb  2.2<L>  /  TBili  6.0<H>  /  DBili  4.0<H>  /  AST  108<H>  /  ALT  68<H>  /  AlkPhos  120  12-30    PT/INR - ( 30 Dec 2023 04:58 )   PT: 14.8 sec;   INR: 1.31          PTT - ( 30 Dec 2023 04:58 )  PTT:35.2 sec  Urinalysis Basic - ( 30 Dec 2023 04:58 )    Color: x / Appearance: x / SG: x / pH: x  Gluc: 132 mg/dL / Ketone: x  / Bili: x / Urobili: x   Blood: x / Protein: x / Nitrite: x   Leuk Esterase: x / RBC: x / WBC x   Sq Epi: x / Non Sq Epi: x / Bacteria: x      CAPILLARY BLOOD GLUCOSE        LIVER FUNCTIONS - ( 30 Dec 2023 04:58 )  Alb: 2.2 g/dL / Pro: 8.1 g/dL / ALK PHOS: 120 U/L / ALT: 68 U/L / AST: 108 U/L / GGT: x             RADIOLOGY & ADDITIONAL STUDIES:

## 2023-12-30 NOTE — PROGRESS NOTE ADULT - SUBJECTIVE AND OBJECTIVE BOX
Interval Events:  Patient seen and examined at bedside.      Allergies    penicillin (Angioedema)    Intolerances        Vital Signs Last 24 Hrs  T(C): 36.4 (30 Dec 2023 08:34), Max: 37.4 (30 Dec 2023 00:55)  T(F): 97.5 (30 Dec 2023 08:34), Max: 99.3 (30 Dec 2023 00:55)  HR: 73 (30 Dec 2023 10:00) (73 - 98)  BP: 138/63 (30 Dec 2023 10:00) (111/53 - 148/65)  BP(mean): 91 (30 Dec 2023 10:00) (76 - 94)  RR: 20 (30 Dec 2023 10:00) (20 - 38)  SpO2: 94% (30 Dec 2023 10:00) (91% - 100%)    Parameters below as of 30 Dec 2023 10:00  Patient On (Oxygen Delivery Method): room air        12-29 @ 07:01  -  12-30 @ 07:00  --------------------------------------------------------  IN: 4068.2 mL / OUT: 4475 mL / NET: -406.8 mL    12-30 @ 07:01  -  12-30 @ 12:11  --------------------------------------------------------  IN: 260 mL / OUT: 400 mL / NET: -140 mL      12-29 @ 07:01  -  12-30 @ 07:00  --------------------------------------------------------  IN: 4068.2 mL / OUT: 4475 mL / NET: -406.8 mL    12-30 @ 07:01  -  12-30 @ 12:11  --------------------------------------------------------  IN: 260 mL / OUT: 400 mL / NET: -140 mL        Physical Exam:     Neurological: AAOx3, CNII-XII intact,  strength 5/5 b/l  ENT: mucus membrane moist  Cardiovascular: RRR  Respiratory: CTA  Gastrointestinal: soft, NT, ND, BS+, fistula thickish yellowish formed output, no bleeding. surgicel with overlying ABD dressing intact. perc deb bilious,  Extremities: warm, no dependent edema  Vascular: no cyanosis/erythema  Skin: no rashes  MSK: no joint swelling.  Psych: No signs of anxiety or depression        LABS:      CBC Full  -  ( 30 Dec 2023 04:58 )  WBC Count : 7.62 K/uL  RBC Count : 2.74 M/uL  Hemoglobin : 8.3 g/dL  Hematocrit : 26.3 %  Platelet Count - Automated : 183 K/uL  Mean Cell Volume : 96.0 fl  Mean Cell Hemoglobin : 30.3 pg  Mean Cell Hemoglobin Concentration : 31.6 gm/dL  Auto Neutrophil # : 5.22 K/uL  Auto Lymphocyte # : 0.94 K/uL  Auto Monocyte # : 1.00 K/uL  Auto Eosinophil # : 0.36 K/uL  Auto Basophil # : 0.07 K/uL  Auto Neutrophil % : 68.6 %  Auto Lymphocyte % : 12.3 %  Auto Monocyte % : 13.1 %  Auto Eosinophil % : 4.7 %  Auto Basophil % : 0.9 %    12-30    135  |  101  |  45<H>  ----------------------------<  132<H>  4.5   |  27  |  1.56<H>    Ca    8.5      30 Dec 2023 04:58  Phos  3.4     12-30  Mg     2.0     12-30    TPro  8.1  /  Alb  2.2<L>  /  TBili  6.0<H>  /  DBili  4.0<H>  /  AST  108<H>  /  ALT  68<H>  /  AlkPhos  120  12-30    PT/INR - ( 30 Dec 2023 04:58 )   PT: 14.8 sec;   INR: 1.31          PTT - ( 30 Dec 2023 04:58 )  PTT:35.2 sec      Urinalysis Basic - ( 30 Dec 2023 04:58 )    Color: x / Appearance: x / SG: x / pH: x  Gluc: 132 mg/dL / Ketone: x  / Bili: x / Urobili: x   Blood: x / Protein: x / Nitrite: x   Leuk Esterase: x / RBC: x / WBC x   Sq Epi: x / Non Sq Epi: x / Bacteria: x              RADIOLOGY & ADDITIONAL STUDIES (The following images were personally reviewed):          A/p: 78M w PMH Crohn's, AFib/Flutter s/p DCCVs on amiodarone, remote ileocectomy and open appendectomy. Admitted (6/23) for SBO vs Crohns flare, s/p NGT decompression and s/p lap converted to open TRE, SBR x 3, left in discontinuity with abthera vac on (6/27), RTOR for ileocolic resection, small bowel anastomosis, and abdominal wall closure on (6/28), c/b fluid collection s/p IR aspiration of perihepatic fluid on (7/3), c/b wound dehiscence s/p RTOR exlap, washout, ileocolic resection with end ileostomy, blow hole colostomy, red rubber from ileostomy to small bowel anastomosis; vicryl bridging mesh on (7/5) transferred to SICU postoperatively for hemodynamic monitoring, with hospital course complicated by periods of severe ELVI and hyponatremia, which resolved but stepped back up for to SICU on (9/10) for acute AMS, intermittent hypoglycemia, AFib with RVR. Percutaneous Cholecystostomy placed on (9/11) for enlarged GB, PCT capped 10/5 and uncapped 10/25 for hyperbilirubinemia, Right brachial DVT, left basillic and cephalic superficial thrombus on duplex 11/2, CT 11/14 with ALDEN ground glass opacity of unclear etiology, completed empiric 7day cefepime course 11/22, rising T-bili of unclear etilogy, now w resolved candida glabrata fungemia, ELVI improving.     NEURO: Hx of depression: cont Effexor (halved dose in setting of renal dysfunx). Anxiety: Lorazepam PRN. Holistic consult for anxiety and insomnia.   CV: Hx Afib/flutter: s/p DCCV, off Amio and lipitor due to persistent transaminitis, continue labetalol IV 10q6h. TTE  (7/18) - PASP 64mmHg, EF 65-70%. holding Losartan & norvasc because not absorbing PO meds, hydralazine PRN.   PULM: Atelectasis/dyspnea improved clinically: cont 1 hr of BiPAP every 12hrs of nasal canula or room air. Encourage OOB and IS.  CT from 11/14 with ALDEN ground glass opacity of unclear etiology. COVID negative. RVP negative. completed 7d empiric cefepime 11/22 to cover for potential PNA.   GI/FEN: Low res, low fat, high protein diet. Cont Ensure max x1/day. Folate, thiamine. PICC replaced 12/4, will switch out PICC on 1/4. continue TPN/lipids. High output EC fistula: cont Octreotide & Cholestyramine 10/18. Transaminitis elevated T bili of unclear origin, s/p Perc Deb on 9/11, failed capping trial; seen by Hepatology - MRCP 10/30 no obstruction, cont ursodiol, RUQ US 11/25 CBD 5mm, hepatic vessels patent  : Voids. ELVI improved: stopped famotidine given renal dysfunction. MARLO Duplex and RP US unremarkable, CK wnl.    ENDO: Hx hypothyroid: IV Synthroid, TSH low on 11/30, decreased synthroid dose, repeat TSH 12/6 wnl.  ID: currently not on any abx/antifungals. BCx 11/22 grew candida glabrata, likely CLABSI. s/p Caspo & completed Fluconazole course (12/1-12/9). Ophthalmology evaluated - normal ocular exam. Echo no vegetation. PICC replaced on 12/4. Cont Nystatin powder. // DC: caspofungin (11/24-12/1). empiric 7days cefepime (11/15-11/22), C. tertium, Lactobacillis from IR cx 7/3, and candida albicans, lactobacillus, vanc sensitive E faecium, vanc resistant E gallinarum, vanc resistant E casseliflavus, lactobacillus from OR cx 7/5. Completed course of abx with Imipenem (9/10-9/15). Imipenem (8/26--) imipenem (6/30-7/12, 7/23-7/30), Dapto (6/30-7/5 and 7/23-7/24). Empiric dapto (8/23-24) and cefepime (8/23-24).   PPX: SCDs, SQH, was on Therapeutic lovenox bid for R brachial vein DVT, but will hold off on anticoagulation since repeat US Duplex negative.  HEME: Anemia - continue Epogen weekly. S/p Iron Sucrose 200 qd x 3 days for chronic anemia. transfuse 1U PRBC for symptomatic anemia  LINES: ZAHEER Pinto PICC (12/4 - ) will plan to switch PICC once a month (around 1/4) // DC: LUE PICC (9/1-11/1 ), RUE PICC: (11/1-11/24)   WOUNDS/DRAINS: Abdominal wound and fistula with wound manager in place dressing change Tuesday and Friday, had punctate bleeding at wound bed, s/p placed surgicell over - not bleeding at this time. RLQ Ostomy. IR perc deb tube. // DC: L Clay drain.  PT: Ambulate as tolerated OOB to chair daily.  DISPO: SICU due to complicated hospital course.

## 2023-12-30 NOTE — PROGRESS NOTE ADULT - ASSESSMENT
78M w PMH Crohn's, AFib/Flutter s/p DCCVs on amiodarone, remote ileocectomy and open appendectomy. Admitted (6/23) for SBO vs Crohns flare, s/p NGT decompression and s/p lap converted to open TRE, SBR x 3, left in discontinuity with abthera vac on (6/27), RTOR for ileocolic resection, small bowel anastomosis, and abdominal wall closure on (6/28), c/b fluid collection s/p IR aspiration of perihepatic fluid on (7/3), c/b wound dehiscence s/p RTOR exlap, washout, ileocolic resection with end ileostomy, blow hole colostomy, red rubber from ileostomy to small bowel anastomosis; vicryl bridging mesh on (7/5) transferred to SICU postoperatively for hemodynamic monitoring, with hospital course complicated by periods of severe ELVI and hyponatremia, which resolved but stepped back up for to SICU on (9/10) for acute AMS, intermittent hypoglycemia, AFib with RVR. Percutaneous Cholecystostomy placed on (9/11) for enlarged GB, PCT capped 10/5 and uncapped 10/25 for hyperbilirubinemia, Right brachial DVT, left basillic and cephalic superficial thrombus on duplex 11/2, CT 11/14 with ALDEN ground glass opacity of unclear etiology, completed empiric 7day cefepime course 11/22, rising T-bili of unclear etilogy, now w resolved candida glabrata fungemia, ELVI resolving.      care per SICU, appreciate excellent care by SICU team

## 2023-12-30 NOTE — PROGRESS NOTE ADULT - SUBJECTIVE AND OBJECTIVE BOX
Colorectal Surgery  (covering for Dr. Peterson)  5 ICU  TPN      Tolerating diet.  Lots of issues with fistula wound and appliance.  Bleeding yesterday dealt with stiches and epineprhine solution.  Leak from appliance last night was patched.  Stool collecting between bag base and the wound/abdominal wall.  If milked out and pushed into bag then no leakage.  Tired this AM  Ambulated yesterday    Vital Signs Last 24 Hrs  T(C): 36.4 (30 Dec 2023 08:34), Max: 37.4 (30 Dec 2023 00:55)  T(F): 97.5 (30 Dec 2023 08:34), Max: 99.3 (30 Dec 2023 00:55)  HR: 73 (30 Dec 2023 10:00) (73 - 98)  BP: 138/63 (30 Dec 2023 10:00) (111/53 - 148/65)  BP(mean): 91 (30 Dec 2023 10:00) (76 - 94)  RR: 20 (30 Dec 2023 10:00) (20 - 38)  SpO2: 94% (30 Dec 2023 10:00) (91% - 100%)    Parameters below as of 30 Dec 2023 10:00  Patient On (Oxygen Delivery Method): room air    abd: not distended, RUQ stoma with minimal fluid and no red rubber catheter  fistula wound manger in place with reinforcement, minimal leakage.  all effluent directed into pouch and away from the wound  cony drain in place  ext: without calf tenderness    UO: 1000 ml/12 hrs; 2,900 ml/24 hrs  stoma: 375 ml/shift; 775 ml/24 hrs  RUQ ileostomy:  200 ml/24 hrs  cholecystostomy drain:  150 ml/shift; 600 ml/24 hrs                          8.3    7.62  )-----------( 183      ( 30 Dec 2023 04:58 )             26.3   12-30    135  |  101  |  45<H>  ----------------------------<  132<H>  4.5   |  27  |  1.56<H>    Ca    8.5      30 Dec 2023 04:58  Phos  3.4     12-30  Mg     2.0     12-30    TPro  8.1  /  Alb  2.2<L>  /  TBili  6.0<H>  /  DBili  4.0<H>  /  AST  108<H>  /  ALT  68<H>  /  AlkPhos  120  12-30

## 2023-12-30 NOTE — PROGRESS NOTE ADULT - PROVIDER SPECIALTY LIST ADULT
Placed on a trial of Medrol and ordered an audiogram for your bilateral tinnitus. It appears that you have had some barotrauma secondary to scuba diving. I will of course notify you of all results. You have an incidental right septal deviation. SICU

## 2023-12-30 NOTE — PROGRESS NOTE ADULT - ASSESSMENT
A/P  Stable overall.  fistula wound bleeding and leakage have been main issues.  Consistency of stoma output has been more formed but it has been getting caught beneath base of wound manager.  If the output is milked up and into the bag the leakage is minimized.  Ambulate patient with care and be prepared to change the appliance.  Epinephrine in saline on gauze to stop bleeding if it restarts again.  Transfuse if hgb dips below 7.    smaller red rubber for ileostomy to stent open if possible

## 2023-12-31 NOTE — PROGRESS NOTE ADULT - ASSESSMENT
78M w PMH Crohn's, AFib/Flutter s/p DCCVs on amiodarone, remote ileocectomy and open appendectomy. Admitted (6/23) for SBO vs Crohns flare, s/p NGT decompression and s/p lap converted to open TRE, SBR x 3, left in discontinuity with abthera vac on (6/27), RTOR for ileocolic resection, small bowel anastomosis, and abdominal wall closure on (6/28), c/b fluid collection s/p IR aspiration of perihepatic fluid on (7/3), c/b wound dehiscence s/p RTOR exlap, washout, ileocolic resection with end ileostomy, blow hole colostomy, red rubber from ileostomy to small bowel anastomosis; vicryl bridging mesh on (7/5) transferred to SICU postoperatively for hemodynamic monitoring, with hospital course complicated by periods of severe ELVI and hyponatremia, which resolved but stepped back up for to SICU on (9/10) for acute AMS, intermittent hypoglycemia, AFib with RVR. Percutaneous Cholecystostomy placed on (9/11) for enlarged GB, PCT capped 10/5 and uncapped 10/25 for hyperbilirubinemia, Right brachial DVT, left basillic and cephalic superficial thrombus on duplex 11/2, CT 11/14 with ALDEN ground glass opacity of unclear etiology, completed empiric 7day cefepime course 11/22, rising T-bili of unclear etilogy, now w resolved candida glabrata fungemia, ELVI improving.     NEURO: Hx of depression: cont Effexor (halved dose in setting of renal dysfunx). Anxiety: Lorazepam PRN. Holistic consult for anxiety and insomnia.   CV: Hx Afib/flutter: s/p DCCV, off Amio and lipitor due to persistent transaminitis, continue labetalol IV 10q6h, Hydralazine PRN (holding Losartan & norvasc because not absorbing PO meds). TTE  (7/18) - PASP 64mmHg, EF 65-70%.   PULM: Atelectasis/dyspnea improved clinically: cont 1 hr of BiPAP every 12hrs of nasal canula or room air. Encourage OOB and IS.   GI/FEN: Low res, low fat, high protein diet. Cont Ensure max x1/day. Folate, thiamine. PICC replaced 12/4, will switch out PICC on 1/4. Continue TPN/lipids. High output EC fistula: cont Octreotide & Cholestyramine 10/18. Transaminitis elevated T bili of unclear origin, s/p Perc Deb on 9/11, failed capping trial; seen by Hepatology - MRCP 10/30 no obstruction, cont ursodiol, RUQ US 11/25 CBD 5mm, hepatic vessels patent. f/u PCT output, labs today given high output.  : Voids. ELVI improved: stopped famotidine given renal dysfunction. MARLO Duplex and RP US unremarkable, CK wnl.    ENDO: Hx hypothyroid: IV Synthroid, TSH low on 11/30, decreased synthroid dose, repeat TSH 12/6 wnl.  ID: currently not on any abx/antifungals. BCx 11/22 grew candida glabrata, likely CLABSI. s/p Caspo & completed Fluconazole course (12/1-12/9). Ophthalmology evaluated - normal ocular exam. Echo no vegetation. PICC replaced on 12/4. Cont Nystatin powder. // DC: caspofungin (11/24-12/1). empiric 7days cefepime (11/15-11/22), C. tertium, Lactobacillis from IR cx 7/3, and candida albicans, lactobacillus, vanc sensitive E faecium, vanc resistant E gallinarum, vanc resistant E casseliflavus, lactobacillus from OR cx 7/5. Completed course of abx with Imipenem (9/10-9/15). Imipenem (8/26--) imipenem (6/30-7/12, 7/23-7/30), Dapto (6/30-7/5 and 7/23-7/24). Empiric dapto (8/23-24) and cefepime (8/23-24).   PPX: SCDs, SQH, was on Therapeutic lovenox bid for R brachial vein DVT, but will hold off on anticoagulation since repeat US Duplex negative.  HEME: Anemia - continue Epogen weekly. S/p Iron Sucrose 200 qd x 3 days for chronic anemia. transfuse 1U PRBC for symptomatic anemia  LINES: ZAHEER Pinto PICC (12/4 - ) will plan to switch PICC once a month (around 1/4) // DC: MERONE PICC (9/1-11/1 ), HOLLIS PICC: (11/1-11/24)   WOUNDS/DRAINS: Abdominal wound and fistula with wound manager in place dressing change Tuesday and Friday, had punctate bleeding at wound bed, s/p placed surgicell over - not bleeding at this time. RLQ Ostomy. IR perc deb tube. // DC: L Clay drain.  PT: Ambulate as tolerated OOB to chair daily.  DISPO: SICU due to complicated hospital course.      78M w PMH Crohn's, AFib/Flutter s/p DCCVs on amiodarone, remote ileocectomy and open appendectomy. Admitted (6/23) for SBO vs Crohns flare, s/p NGT decompression and s/p lap converted to open TRE, SBR x 3, left in discontinuity with abthera vac on (6/27), RTOR for ileocolic resection, small bowel anastomosis, and abdominal wall closure on (6/28), c/b fluid collection s/p IR aspiration of perihepatic fluid on (7/3), c/b wound dehiscence s/p RTOR exlap, washout, ileocolic resection with end ileostomy, blow hole colostomy, red rubber from ileostomy to small bowel anastomosis; vicryl bridging mesh on (7/5) transferred to SICU postoperatively for hemodynamic monitoring, with hospital course complicated by periods of severe ELVI and hyponatremia, which resolved but stepped back up for to SICU on (9/10) for acute AMS, intermittent hypoglycemia, AFib with RVR. Percutaneous Cholecystostomy placed on (9/11) for enlarged GB, PCT capped 10/5 and uncapped 10/25 for hyperbilirubinemia, Right brachial DVT, left basillic and cephalic superficial thrombus on duplex 11/2, CT 11/14 with ALDEN ground glass opacity of unclear etiology, completed empiric 7day cefepime course 11/22, rising T-bili of unclear etilogy, now w resolved candida glabrata fungemia, ELVI improving.     NEURO: Hx of depression: cont Effexor (halved dose in setting of renal dysfunx). Anxiety: Lorazepam PRN. Holistic consult for anxiety and insomnia.   CV: Hx Afib/flutter: s/p DCCV, off Amio and lipitor due to persistent transaminitis, continue labetalol IV 10q6h, Hydralazine PRN (holding Losartan & norvasc because not absorbing PO meds). TTE  (7/18) - PASP 64mmHg, EF 65-70%.   PULM: Atelectasis/dyspnea improved clinically: cont 1 hr of BiPAP every 12hrs of nasal canula or room air. Encourage OOB and IS.   GI/FEN: Low res, low fat, high protein diet. Cont Ensure max x1/day. Folate, thiamine. PICC replaced 12/4, will switch out PICC on 1/4. Continue TPN/lipids. High output EC fistula: cont Octreotide & Cholestyramine 10/18. Transaminitis elevated T bili of unclear origin, s/p Perc Dbe on 9/11, failed capping trial; seen by Hepatology - MRCP 10/30 no obstruction, cont ursodiol, RUQ US 11/25 CBD 5mm, hepatic vessels patent. f/u PCT output, labs today given high output.  : Voids. ELVI improved: stopped famotidine given renal dysfunction. MARLO Duplex and RP US unremarkable, CK wnl.    ENDO: Hx hypothyroid: IV Synthroid, TSH low on 11/30, decreased synthroid dose, repeat TSH 12/6 wnl.  ID: currently not on any abx/antifungals. BCx 11/22 grew candida glabrata, likely CLABSI. s/p Caspo & completed Fluconazole course (12/1-12/9). Ophthalmology evaluated - normal ocular exam. Echo no vegetation. PICC replaced on 12/4. Cont Nystatin powder. // DC: caspofungin (11/24-12/1). empiric 7days cefepime (11/15-11/22), C. tertium, Lactobacillis from IR cx 7/3, and candida albicans, lactobacillus, vanc sensitive E faecium, vanc resistant E gallinarum, vanc resistant E casseliflavus, lactobacillus from OR cx 7/5. Completed course of abx with Imipenem (9/10-9/15). Imipenem (8/26--) imipenem (6/30-7/12, 7/23-7/30), Dapto (6/30-7/5 and 7/23-7/24). Empiric dapto (8/23-24) and cefepime (8/23-24).   PPX: SCDs, SQH, was on Therapeutic lovenox bid for R brachial vein DVT, but will hold off on anticoagulation since repeat US Duplex negative.  HEME: Anemia - continue Epogen weekly. S/p Iron Sucrose 200 qd x 3 days for chronic anemia. transfuse 1U PRBC for symptomatic anemia  LINES: ZAHEER Pinto PICC (12/4 - ) will plan to switch PICC once a month (around 1/4) // DC: MERONE PICC (9/1-11/1 ), HOLLIS PICC: (11/1-11/24)   WOUNDS/DRAINS: Abdominal wound and fistula with wound manager in place dressing change Tuesday and Friday, had punctate bleeding at wound bed, s/p placed surgicell over - not bleeding at this time. RLQ Ostomy. IR perc deb tube. // DC: L Clay drain.  PT: Ambulate as tolerated OOB to chair daily.  DISPO: SICU due to complicated hospital course.

## 2023-12-31 NOTE — PROGRESS NOTE ADULT - SUBJECTIVE AND OBJECTIVE BOX
SUBJECTIVE:  Patient was evaluated at bedside by general surgery team today. Patient has no acute complaints at this time.    MEDICATIONS  (STANDING):  chlorhexidine 2% Cloths 1 Application(s) Topical <User Schedule>  cholestyramine Powder (Sugar-Free) 4 Gram(s) Oral daily  epoetin matt-epbx (RETACRIT) Injectable 42279 Unit(s) SubCutaneous every 7 days  ergocalciferol 44819 Unit(s) Oral <User Schedule>  gabapentin 100 milliGRAM(s) Oral at bedtime  glucagon  Injectable 1 milliGRAM(s) IntraMuscular once  labetalol Injectable 10 milliGRAM(s) IV Push every 6 hours  levothyroxine Injectable 30 MICROGram(s) IV Push at bedtime  lipid, fat emulsion (Fish Oil and Plant Based) 20% Infusion 0.54 Gm/kG/Day (20.83 mL/Hr) IV Continuous <Continuous>  Parenteral Nutrition - Adult 1 Each TPN Continuous <Continuous>  silver nitrate Applicator 1 Application(s) Topical once  ursodiol Suspension 300 milliGRAM(s) Oral every 8 hours  venlafaxine XR. 150 milliGRAM(s) Oral daily    MEDICATIONS  (PRN):  chlorhexidine 0.12% Liquid 15 milliLiter(s) Swish and Spit two times a day PRN oral hygeine  hydrALAZINE Injectable 10 milliGRAM(s) IV Push every 6 hours PRN SBP >170  lidocaine 2% Viscous 10 milliLiter(s) Swish and Spit every 12 hours PRN tongue pain  LORazepam   Injectable 1 milliGRAM(s) IV Push <User Schedule> PRN Anxiety  ondansetron Injectable 4 milliGRAM(s) IV Push every 6 hours PRN Nausea and/or Vomiting      Vital Signs Last 24 Hrs  T(C): 36.7 (31 Dec 2023 05:09), Max: 37 (31 Dec 2023 01:13)  T(F): 98 (31 Dec 2023 05:09), Max: 98.6 (31 Dec 2023 01:13)  HR: 100 (31 Dec 2023 05:00) (73 - 100)  BP: 129/60 (31 Dec 2023 05:00) (105/50 - 138/63)  BP(mean): 87 (31 Dec 2023 05:00) (65 - 91)  RR: 23 (31 Dec 2023 05:00) (12 - 25)  SpO2: 97% (31 Dec 2023 05:00) (94% - 100%)    Parameters below as of 31 Dec 2023 05:00  Patient On (Oxygen Delivery Method): room air        Physical Exam:  General: NAD, resting comfortably in bed  Pulmonary: Nonlabored breathing, no respiratory distress  Cardiovascular: NSR  Abdominal: soft, NT/ND  Extremities: WWP  Neuro: A/O x 3, CNs II-XII grossly intact, no focal deficits      I&O's Summary    30 Dec 2023 07:01  -  31 Dec 2023 07:00  --------------------------------------------------------  IN: 3787.6 mL / OUT: 3735 mL / NET: 52.6 mL        LABS:                        8.3    7.62  )-----------( 183      ( 30 Dec 2023 04:58 )             26.3     12-30    135  |  101  |  45<H>  ----------------------------<  132<H>  4.5   |  27  |  1.56<H>    Ca    8.5      30 Dec 2023 04:58  Phos  3.4     12-30  Mg     2.0     12-30    TPro  8.1  /  Alb  2.2<L>  /  TBili  6.0<H>  /  DBili  4.0<H>  /  AST  108<H>  /  ALT  68<H>  /  AlkPhos  120  12-30    PT/INR - ( 30 Dec 2023 04:58 )   PT: 14.8 sec;   INR: 1.31          PTT - ( 30 Dec 2023 04:58 )  PTT:35.2 sec  Urinalysis Basic - ( 30 Dec 2023 04:58 )    Color: x / Appearance: x / SG: x / pH: x  Gluc: 132 mg/dL / Ketone: x  / Bili: x / Urobili: x   Blood: x / Protein: x / Nitrite: x   Leuk Esterase: x / RBC: x / WBC x   Sq Epi: x / Non Sq Epi: x / Bacteria: x      CAPILLARY BLOOD GLUCOSE        LIVER FUNCTIONS - ( 30 Dec 2023 04:58 )  Alb: 2.2 g/dL / Pro: 8.1 g/dL / ALK PHOS: 120 U/L / ALT: 68 U/L / AST: 108 U/L / GGT: x             RADIOLOGY & ADDITIONAL STUDIES:       SUBJECTIVE:  Patient was evaluated at bedside by general surgery team today. Patient has no acute complaints at this time.    MEDICATIONS  (STANDING):  chlorhexidine 2% Cloths 1 Application(s) Topical <User Schedule>  cholestyramine Powder (Sugar-Free) 4 Gram(s) Oral daily  epoetin matt-epbx (RETACRIT) Injectable 43933 Unit(s) SubCutaneous every 7 days  ergocalciferol 86330 Unit(s) Oral <User Schedule>  gabapentin 100 milliGRAM(s) Oral at bedtime  glucagon  Injectable 1 milliGRAM(s) IntraMuscular once  labetalol Injectable 10 milliGRAM(s) IV Push every 6 hours  levothyroxine Injectable 30 MICROGram(s) IV Push at bedtime  lipid, fat emulsion (Fish Oil and Plant Based) 20% Infusion 0.54 Gm/kG/Day (20.83 mL/Hr) IV Continuous <Continuous>  Parenteral Nutrition - Adult 1 Each TPN Continuous <Continuous>  silver nitrate Applicator 1 Application(s) Topical once  ursodiol Suspension 300 milliGRAM(s) Oral every 8 hours  venlafaxine XR. 150 milliGRAM(s) Oral daily    MEDICATIONS  (PRN):  chlorhexidine 0.12% Liquid 15 milliLiter(s) Swish and Spit two times a day PRN oral hygeine  hydrALAZINE Injectable 10 milliGRAM(s) IV Push every 6 hours PRN SBP >170  lidocaine 2% Viscous 10 milliLiter(s) Swish and Spit every 12 hours PRN tongue pain  LORazepam   Injectable 1 milliGRAM(s) IV Push <User Schedule> PRN Anxiety  ondansetron Injectable 4 milliGRAM(s) IV Push every 6 hours PRN Nausea and/or Vomiting      Vital Signs Last 24 Hrs  T(C): 36.7 (31 Dec 2023 05:09), Max: 37 (31 Dec 2023 01:13)  T(F): 98 (31 Dec 2023 05:09), Max: 98.6 (31 Dec 2023 01:13)  HR: 100 (31 Dec 2023 05:00) (73 - 100)  BP: 129/60 (31 Dec 2023 05:00) (105/50 - 138/63)  BP(mean): 87 (31 Dec 2023 05:00) (65 - 91)  RR: 23 (31 Dec 2023 05:00) (12 - 25)  SpO2: 97% (31 Dec 2023 05:00) (94% - 100%)    Parameters below as of 31 Dec 2023 05:00  Patient On (Oxygen Delivery Method): room air        Physical Exam:  General: NAD, resting comfortably in bed  Pulmonary: Nonlabored breathing, no respiratory distress  Cardiovascular: NSR  Abdominal: soft, NT/ND  Extremities: WWP  Neuro: A/O x 3, CNs II-XII grossly intact, no focal deficits      I&O's Summary    30 Dec 2023 07:01  -  31 Dec 2023 07:00  --------------------------------------------------------  IN: 3787.6 mL / OUT: 3735 mL / NET: 52.6 mL        LABS:                        8.3    7.62  )-----------( 183      ( 30 Dec 2023 04:58 )             26.3     12-30    135  |  101  |  45<H>  ----------------------------<  132<H>  4.5   |  27  |  1.56<H>    Ca    8.5      30 Dec 2023 04:58  Phos  3.4     12-30  Mg     2.0     12-30    TPro  8.1  /  Alb  2.2<L>  /  TBili  6.0<H>  /  DBili  4.0<H>  /  AST  108<H>  /  ALT  68<H>  /  AlkPhos  120  12-30    PT/INR - ( 30 Dec 2023 04:58 )   PT: 14.8 sec;   INR: 1.31          PTT - ( 30 Dec 2023 04:58 )  PTT:35.2 sec  Urinalysis Basic - ( 30 Dec 2023 04:58 )    Color: x / Appearance: x / SG: x / pH: x  Gluc: 132 mg/dL / Ketone: x  / Bili: x / Urobili: x   Blood: x / Protein: x / Nitrite: x   Leuk Esterase: x / RBC: x / WBC x   Sq Epi: x / Non Sq Epi: x / Bacteria: x      CAPILLARY BLOOD GLUCOSE        LIVER FUNCTIONS - ( 30 Dec 2023 04:58 )  Alb: 2.2 g/dL / Pro: 8.1 g/dL / ALK PHOS: 120 U/L / ALT: 68 U/L / AST: 108 U/L / GGT: x             RADIOLOGY & ADDITIONAL STUDIES:

## 2023-12-31 NOTE — PROGRESS NOTE ADULT - SUBJECTIVE AND OBJECTIVE BOX
INTERVAL/OVERNIGHT EVENTS: Net even.     SUBJECTIVE: This AM, doing well without complaints. No N/V or abd pain.    Neurologic Medications  gabapentin 100 milliGRAM(s) Oral at bedtime  LORazepam   Injectable 1 milliGRAM(s) IV Push <User Schedule> PRN Anxiety  ondansetron Injectable 4 milliGRAM(s) IV Push every 6 hours PRN Nausea and/or Vomiting  venlafaxine XR. 150 milliGRAM(s) Oral daily    Cardiovascular Medications  hydrALAZINE Injectable 10 milliGRAM(s) IV Push every 6 hours PRN SBP >170  labetalol Injectable 10 milliGRAM(s) IV Push every 6 hours    Gastrointestinal Medications  ergocalciferol 46069 Unit(s) Oral <User Schedule>  lipid, fat emulsion (Fish Oil and Plant Based) 20% Infusion 0.54 Gm/kG/Day IV Continuous <Continuous>  Parenteral Nutrition - Adult 1 Each TPN Continuous <Continuous>  ursodiol Suspension 300 milliGRAM(s) Oral every 8 hours    Hematologic/Oncologic Medications  epoetin matt-epbx (RETACRIT) Injectable 63200 Unit(s) SubCutaneous every 7 days    Endocrine/Metabolic Medications  cholestyramine Powder (Sugar-Free) 4 Gram(s) Oral daily  glucagon  Injectable 1 milliGRAM(s) IntraMuscular once  levothyroxine Injectable 30 MICROGram(s) IV Push at bedtime    Topical/Other Medications  chlorhexidine 0.12% Liquid 15 milliLiter(s) Swish and Spit two times a day PRN oral hygeine  chlorhexidine 2% Cloths 1 Application(s) Topical <User Schedule>  lidocaine 2% Viscous 10 milliLiter(s) Swish and Spit every 12 hours PRN tongue pain  silver nitrate Applicator 1 Application(s) Topical once    MEDICATIONS  (PRN):  chlorhexidine 0.12% Liquid 15 milliLiter(s) Swish and Spit two times a day PRN oral hygeine  hydrALAZINE Injectable 10 milliGRAM(s) IV Push every 6 hours PRN SBP >170  lidocaine 2% Viscous 10 milliLiter(s) Swish and Spit every 12 hours PRN tongue pain  LORazepam   Injectable 1 milliGRAM(s) IV Push <User Schedule> PRN Anxiety  ondansetron Injectable 4 milliGRAM(s) IV Push every 6 hours PRN Nausea and/or Vomiting    I&O's Detail    30 Dec 2023 07:01  -  31 Dec 2023 07:00  --------------------------------------------------------  IN:    Fat Emulsion (Fish Oil &amp; Plant Based) 20% Infusion: 249.6 mL    IV PiggyBack: 500 mL    Oral Fluid: 1000 mL    TPN (Total Parenteral Nutrition): 2176 mL  Total IN: 3925.6 mL    OUT:    Drain (mL): 1175 mL    Drain (mL): 1060 mL    Voided (mL): 1500 mL  Total OUT: 3735 mL    Total NET: 190.6 mL    31 Dec 2023 07:01  -  31 Dec 2023 09:10  --------------------------------------------------------  IN:  Total IN: 0 mL    OUT:    Fat Emulsion (Fish Oil &amp; Plant Based) 20% Infusion: 0 mL    TPN (Total Parenteral Nutrition): 0 mL    Voided (mL): 300 mL  Total OUT: 300 mL    Total NET: -300 mL    Vital Signs Last 24 Hrs  T(C): 36.7 (31 Dec 2023 05:09), Max: 37 (31 Dec 2023 01:13)  T(F): 98 (31 Dec 2023 05:09), Max: 98.6 (31 Dec 2023 01:13)  HR: 100 (31 Dec 2023 09:00) (73 - 100)  BP: 140/63 (31 Dec 2023 09:00) (105/50 - 140/63)  BP(mean): 90 (31 Dec 2023 09:00) (65 - 91)  RR: 21 (31 Dec 2023 09:00) (12 - 25)  SpO2: 96% (31 Dec 2023 09:00) (94% - 100%)    Parameters below as of 31 Dec 2023 09:00  Patient On (Oxygen Delivery Method): room air    GENERAL: NAD, resting comfortably in bed  HEENT: NCAT, MMM  C/V: Normal rate, normal peripheral perfusion, no murmurs  PULM: Nonlabored breathing, no respiratory distress, CTA b/l  ABD: Soft, ND, NT, no rebound tenderness, no guarding, ECF pouched wound manager in place, PCT bilious  EXTREM: WWP, no edema, SCDs in place  NEURO: No focal deficits    LABS:                        8.3    7.62  )-----------( 183      ( 30 Dec 2023 04:58 )             26.3     12-30    135  |  101  |  45<H>  ----------------------------<  132<H>  4.5   |  27  |  1.56<H>    Ca    8.5      30 Dec 2023 04:58  Phos  3.4     12-30  Mg     2.0     12-30    TPro  8.1  /  Alb  2.2<L>  /  TBili  6.0<H>  /  DBili  4.0<H>  /  AST  108<H>  /  ALT  68<H>  /  AlkPhos  120  12-30    PT/INR - ( 30 Dec 2023 04:58 )   PT: 14.8 sec;   INR: 1.31        PTT - ( 30 Dec 2023 04:58 )  PTT:35.2 sec  Urinalysis Basic - ( 30 Dec 2023 04:58 )    Color: x / Appearance: x / SG: x / pH: x  Gluc: 132 mg/dL / Ketone: x  / Bili: x / Urobili: x   Blood: x / Protein: x / Nitrite: x   Leuk Esterase: x / RBC: x / WBC x   Sq Epi: x / Non Sq Epi: x / Bacteria: x INTERVAL/OVERNIGHT EVENTS: Net even.     SUBJECTIVE: This AM, doing well without complaints. No N/V or abd pain.    Neurologic Medications  gabapentin 100 milliGRAM(s) Oral at bedtime  LORazepam   Injectable 1 milliGRAM(s) IV Push <User Schedule> PRN Anxiety  ondansetron Injectable 4 milliGRAM(s) IV Push every 6 hours PRN Nausea and/or Vomiting  venlafaxine XR. 150 milliGRAM(s) Oral daily    Cardiovascular Medications  hydrALAZINE Injectable 10 milliGRAM(s) IV Push every 6 hours PRN SBP >170  labetalol Injectable 10 milliGRAM(s) IV Push every 6 hours    Gastrointestinal Medications  ergocalciferol 73010 Unit(s) Oral <User Schedule>  lipid, fat emulsion (Fish Oil and Plant Based) 20% Infusion 0.54 Gm/kG/Day IV Continuous <Continuous>  Parenteral Nutrition - Adult 1 Each TPN Continuous <Continuous>  ursodiol Suspension 300 milliGRAM(s) Oral every 8 hours    Hematologic/Oncologic Medications  epoetin matt-epbx (RETACRIT) Injectable 15478 Unit(s) SubCutaneous every 7 days    Endocrine/Metabolic Medications  cholestyramine Powder (Sugar-Free) 4 Gram(s) Oral daily  glucagon  Injectable 1 milliGRAM(s) IntraMuscular once  levothyroxine Injectable 30 MICROGram(s) IV Push at bedtime    Topical/Other Medications  chlorhexidine 0.12% Liquid 15 milliLiter(s) Swish and Spit two times a day PRN oral hygeine  chlorhexidine 2% Cloths 1 Application(s) Topical <User Schedule>  lidocaine 2% Viscous 10 milliLiter(s) Swish and Spit every 12 hours PRN tongue pain  silver nitrate Applicator 1 Application(s) Topical once    MEDICATIONS  (PRN):  chlorhexidine 0.12% Liquid 15 milliLiter(s) Swish and Spit two times a day PRN oral hygeine  hydrALAZINE Injectable 10 milliGRAM(s) IV Push every 6 hours PRN SBP >170  lidocaine 2% Viscous 10 milliLiter(s) Swish and Spit every 12 hours PRN tongue pain  LORazepam   Injectable 1 milliGRAM(s) IV Push <User Schedule> PRN Anxiety  ondansetron Injectable 4 milliGRAM(s) IV Push every 6 hours PRN Nausea and/or Vomiting    I&O's Detail    30 Dec 2023 07:01  -  31 Dec 2023 07:00  --------------------------------------------------------  IN:    Fat Emulsion (Fish Oil &amp; Plant Based) 20% Infusion: 249.6 mL    IV PiggyBack: 500 mL    Oral Fluid: 1000 mL    TPN (Total Parenteral Nutrition): 2176 mL  Total IN: 3925.6 mL    OUT:    Drain (mL): 1175 mL    Drain (mL): 1060 mL    Voided (mL): 1500 mL  Total OUT: 3735 mL    Total NET: 190.6 mL    31 Dec 2023 07:01  -  31 Dec 2023 09:10  --------------------------------------------------------  IN:  Total IN: 0 mL    OUT:    Fat Emulsion (Fish Oil &amp; Plant Based) 20% Infusion: 0 mL    TPN (Total Parenteral Nutrition): 0 mL    Voided (mL): 300 mL  Total OUT: 300 mL    Total NET: -300 mL    Vital Signs Last 24 Hrs  T(C): 36.7 (31 Dec 2023 05:09), Max: 37 (31 Dec 2023 01:13)  T(F): 98 (31 Dec 2023 05:09), Max: 98.6 (31 Dec 2023 01:13)  HR: 100 (31 Dec 2023 09:00) (73 - 100)  BP: 140/63 (31 Dec 2023 09:00) (105/50 - 140/63)  BP(mean): 90 (31 Dec 2023 09:00) (65 - 91)  RR: 21 (31 Dec 2023 09:00) (12 - 25)  SpO2: 96% (31 Dec 2023 09:00) (94% - 100%)    Parameters below as of 31 Dec 2023 09:00  Patient On (Oxygen Delivery Method): room air    GENERAL: NAD, resting comfortably in bed  HEENT: NCAT, MMM  C/V: Normal rate, normal peripheral perfusion, no murmurs  PULM: Nonlabored breathing, no respiratory distress, CTA b/l  ABD: Soft, ND, NT, no rebound tenderness, no guarding, ECF pouched wound manager in place, PCT bilious  EXTREM: WWP, no edema, SCDs in place  NEURO: No focal deficits    LABS:                        8.3    7.62  )-----------( 183      ( 30 Dec 2023 04:58 )             26.3     12-30    135  |  101  |  45<H>  ----------------------------<  132<H>  4.5   |  27  |  1.56<H>    Ca    8.5      30 Dec 2023 04:58  Phos  3.4     12-30  Mg     2.0     12-30    TPro  8.1  /  Alb  2.2<L>  /  TBili  6.0<H>  /  DBili  4.0<H>  /  AST  108<H>  /  ALT  68<H>  /  AlkPhos  120  12-30    PT/INR - ( 30 Dec 2023 04:58 )   PT: 14.8 sec;   INR: 1.31        PTT - ( 30 Dec 2023 04:58 )  PTT:35.2 sec  Urinalysis Basic - ( 30 Dec 2023 04:58 )    Color: x / Appearance: x / SG: x / pH: x  Gluc: 132 mg/dL / Ketone: x  / Bili: x / Urobili: x   Blood: x / Protein: x / Nitrite: x   Leuk Esterase: x / RBC: x / WBC x   Sq Epi: x / Non Sq Epi: x / Bacteria: x

## 2023-12-31 NOTE — PROGRESS NOTE ADULT - SUBJECTIVE AND OBJECTIVE BOX
Colon and rectal surgery  (Covering for Dr. Peterson)  5ICU    No big changes  Not c/o pain  On diet and tolerating  Been ambulating.  On TPN    VS  ICU Vital Signs Last 24 Hrs  T(C): 37 (31 Dec 2023 08:29), Max: 37 (31 Dec 2023 01:13)  T(F): 98.6 (31 Dec 2023 08:29), Max: 98.6 (31 Dec 2023 01:13)  HR: 100 (31 Dec 2023 09:00) (86 - 100)  BP: 140/63 (31 Dec 2023 09:00) (105/50 - 140/63)  BP(mean): 90 (31 Dec 2023 09:00) (65 - 90)  ABP: --  ABP(mean): --  RR: 21 (31 Dec 2023 09:00) (12 - 25)  SpO2: 96% (31 Dec 2023 09:00) (94% - 100%)    O2 Parameters below as of 31 Dec 2023 09:00  Patient On (Oxygen Delivery Method): room air    Abd: not distended, wound and fistula without change, wound manager in place, ileostomy appliance also in place, Deb drain in place  Ext: without calf tenderness     ml + last shift until 7 AM; 1,500 ml/24 hrs;  700 ml this shift until 12:30 PM  Fistula: 300 ml/ shift; 1060 ml/ 24 hrs; 225 ml tis shift until 12:30 PM  Deb drain: 525 ml/ shift, 1,175 ml/24 hrs;  250 ml today until 12:30 PM    Labs                        8.3    7.62  )-----------( 183      ( 30 Dec 2023 04:58 )             26.3   12-31    134<L>  |  102  |  50<H>  ----------------------------<  82  4.8   |  25  |  1.52<H>    Ca    8.3<L>      31 Dec 2023 08:14  Phos  3.1     12-31  Mg     2.2     12-31    TPro  8.6<H>  /  Alb  2.3<L>  /  TBili  6.1<H>  /  DBili  x   /  AST  94<H>  /  ALT  66<H>  /  AlkPhos  119  12-31

## 2023-12-31 NOTE — PROGRESS NOTE ADULT - ASSESSMENT
78M w PMH Crohn's, AFib/Flutter s/p DCCVs on amiodarone, remote ileocectomy and open appendectomy. Admitted (6/23) for SBO vs Crohns flare, s/p NGT decompression and s/p lap converted to open TRE, SBR x 3, left in discontinuity with abthera vac on (6/27), RTOR for ileocolic resection, small bowel anastomosis, and abdominal wall closure on (6/28), c/b fluid collection s/p IR aspiration of perihepatic fluid on (7/3), c/b wound dehiscence s/p RTOR exlap, washout, ileocolic resection with end ileostomy, blow hole colostomy, red rubber from ileostomy to small bowel anastomosis; vicryl bridging mesh on (7/5) transferred to SICU postoperatively for hemodynamic monitoring, with hospital course complicated by periods of severe ELVI and hyponatremia, which resolved but stepped back up for to SICU on (9/10) for acute AMS, intermittent hypoglycemia, AFib with RVR. Percutaneous Cholecystostomy placed on (9/11) for enlarged GB, PCT capped 10/5 and uncapped 10/25 for hyperbilirubinemia, Right brachial DVT, left basillic and cephalic superficial thrombus on duplex 11/2, CT 11/14 with ALDEN ground glass opacity of unclear etiology, completed empiric 7day cefepime course 11/22, rising T-bili of unclear etilogy, now w resolved candida glabrata fungemia, ELVI resolving.    Continue excellent care per SICU team 78M w PMH Crohn's, AFib/Flutter s/p DCCVs on amiodarone, remote ileocectomy and open appendectomy. Admitted (6/23) for SBO vs Crohns flare, s/p NGT decompression and s/p lap converted to open TRE, SBR x 3, left in discontinuity with abthera vac on (6/27), RTOR for ileocolic resection, small bowel anastomosis, and abdominal wall closure on (6/28), c/b fluid collection s/p IR aspiration of perihepatic fluid on (7/3), c/b wound dehiscence s/p RTOR exlap, washout, ileocolic resection with end ileostomy, blow hole colostomy, red rubber from ileostomy to small bowel anastomosis; vicryl bridging mesh on (7/5) transferred to SICU postoperatively for hemodynamic monitoring, with hospital course complicated by periods of severe ELVI and hyponatremia, which resolved but stepped back up for to SICU on (9/10) for acute AMS, intermittent hypoglycemia, AFib with RVR. Percutaneous Cholecystostomy placed on (9/11) for enlarged GB, PCT capped 10/5 and uncapped 10/25 for hyperbilirubinemia, Right brachial DVT, left basillic and cephalic superficial thrombus on duplex 11/2, CT 11/14 with LADEN ground glass opacity of unclear etiology, completed empiric 7day cefepime course 11/22, rising T-bili of unclear etilogy, now w resolved candida glabrata fungemia, ELVI resolving.    Continue excellent care per SICU team

## 2023-12-31 NOTE — PROGRESS NOTE ADULT - ASSESSMENT
A/P No major changes.   Leaking fistula wound manager been patched and has been holding up. Output is reasonable. Not been bleeding.  On TPN and on diet  Ambulate as tolerated and carefully  As before

## 2024-01-01 ENCOUNTER — TRANSCRIPTION ENCOUNTER (OUTPATIENT)
Age: 79
End: 2024-01-01

## 2024-01-01 ENCOUNTER — INPATIENT (INPATIENT)
Facility: HOSPITAL | Age: 79
LOS: 0 days | Discharge: HOME | DRG: 812 | End: 2024-03-19
Attending: INTERNAL MEDICINE | Admitting: INTERNAL MEDICINE
Payer: COMMERCIAL

## 2024-01-01 ENCOUNTER — INPATIENT (INPATIENT)
Facility: HOSPITAL | Age: 79
LOS: 0 days | Discharge: HOME HEALTH | End: 2024-04-17
Attending: COLON & RECTAL SURGERY | Admitting: COLON & RECTAL SURGERY
Payer: COMMERCIAL

## 2024-01-01 ENCOUNTER — INPATIENT (INPATIENT)
Facility: HOSPITAL | Age: 79
LOS: 14 days | Discharge: ANOTHER HOSPITAL | End: 2024-05-20
Attending: COLON & RECTAL SURGERY | Admitting: COLON & RECTAL SURGERY
Payer: MEDICARE

## 2024-01-01 ENCOUNTER — EMERGENCY (EMERGENCY)
Facility: HOSPITAL | Age: 79
LOS: 1 days | Discharge: HOME | End: 2024-01-01
Attending: EMERGENCY MEDICINE | Admitting: EMERGENCY MEDICINE
Payer: MEDICARE

## 2024-01-01 ENCOUNTER — RESULT REVIEW (OUTPATIENT)
Age: 79
End: 2024-01-01

## 2024-01-01 ENCOUNTER — APPOINTMENT (OUTPATIENT)
Age: 79
End: 2024-01-01

## 2024-01-01 ENCOUNTER — INPATIENT (INPATIENT)
Facility: HOSPITAL | Age: 79
LOS: 7 days | Discharge: HOME HEALTH | DRG: 356 | End: 2024-04-05
Attending: INTERNAL MEDICINE | Admitting: INTERNAL MEDICINE
Payer: MEDICARE

## 2024-01-01 ENCOUNTER — INPATIENT (INPATIENT)
Facility: HOSPITAL | Age: 79
LOS: 2 days | Discharge: HOME HEALTH | End: 2024-05-03
Attending: COLON & RECTAL SURGERY | Admitting: COLON & RECTAL SURGERY
Payer: MEDICARE

## 2024-01-01 ENCOUNTER — EMERGENCY (EMERGENCY)
Facility: HOSPITAL | Age: 79
LOS: 1 days | Discharge: HOME | End: 2024-01-01
Attending: STUDENT IN AN ORGANIZED HEALTH CARE EDUCATION/TRAINING PROGRAM | Admitting: EMERGENCY MEDICINE
Payer: MEDICARE

## 2024-01-01 ENCOUNTER — INPATIENT (INPATIENT)
Facility: HOSPITAL | Age: 79
LOS: 2 days | Discharge: HOME HEALTH | DRG: 920 | End: 2024-04-28
Attending: COLON & RECTAL SURGERY | Admitting: COLON & RECTAL SURGERY
Payer: MEDICARE

## 2024-01-01 ENCOUNTER — INPATIENT (INPATIENT)
Facility: HOSPITAL | Age: 79
LOS: 12 days | End: 2024-06-11
Attending: COLON & RECTAL SURGERY | Admitting: INTERNAL MEDICINE
Payer: MEDICARE

## 2024-01-01 ENCOUNTER — INPATIENT (INPATIENT)
Facility: HOSPITAL | Age: 79
LOS: 3 days | Discharge: HOME HEALTH | DRG: 872 | End: 2024-03-03
Attending: INTERNAL MEDICINE | Admitting: INTERNAL MEDICINE
Payer: MEDICARE

## 2024-01-01 ENCOUNTER — INPATIENT (INPATIENT)
Facility: HOSPITAL | Age: 79
LOS: 1 days | Discharge: HOME | End: 2024-03-13
Attending: INTERNAL MEDICINE | Admitting: INTERNAL MEDICINE
Payer: MEDICARE

## 2024-01-01 VITALS
OXYGEN SATURATION: 100 % | SYSTOLIC BLOOD PRESSURE: 120 MMHG | RESPIRATION RATE: 28 BRPM | TEMPERATURE: 98 F | DIASTOLIC BLOOD PRESSURE: 59 MMHG | HEART RATE: 91 BPM

## 2024-01-01 VITALS
TEMPERATURE: 98 F | HEIGHT: 74 IN | OXYGEN SATURATION: 95 % | WEIGHT: 210.1 LBS | RESPIRATION RATE: 16 BRPM | SYSTOLIC BLOOD PRESSURE: 106 MMHG | DIASTOLIC BLOOD PRESSURE: 66 MMHG | HEART RATE: 109 BPM

## 2024-01-01 VITALS
DIASTOLIC BLOOD PRESSURE: 77 MMHG | RESPIRATION RATE: 16 BRPM | HEART RATE: 87 BPM | OXYGEN SATURATION: 94 % | SYSTOLIC BLOOD PRESSURE: 126 MMHG | TEMPERATURE: 98 F

## 2024-01-01 VITALS
OXYGEN SATURATION: 95 % | HEIGHT: 74 IN | DIASTOLIC BLOOD PRESSURE: 64 MMHG | SYSTOLIC BLOOD PRESSURE: 107 MMHG | HEART RATE: 109 BPM | TEMPERATURE: 97 F | RESPIRATION RATE: 20 BRPM

## 2024-01-01 VITALS
TEMPERATURE: 98 F | SYSTOLIC BLOOD PRESSURE: 107 MMHG | HEART RATE: 107 BPM | DIASTOLIC BLOOD PRESSURE: 67 MMHG | OXYGEN SATURATION: 97 % | RESPIRATION RATE: 17 BRPM

## 2024-01-01 VITALS
TEMPERATURE: 98 F | DIASTOLIC BLOOD PRESSURE: 65 MMHG | RESPIRATION RATE: 20 BRPM | OXYGEN SATURATION: 94 % | HEIGHT: 73 IN | HEART RATE: 95 BPM | SYSTOLIC BLOOD PRESSURE: 95 MMHG

## 2024-01-01 VITALS
OXYGEN SATURATION: 97 % | DIASTOLIC BLOOD PRESSURE: 56 MMHG | SYSTOLIC BLOOD PRESSURE: 90 MMHG | RESPIRATION RATE: 16 BRPM | HEIGHT: 73 IN | WEIGHT: 210.1 LBS | HEART RATE: 89 BPM | TEMPERATURE: 98 F

## 2024-01-01 VITALS
SYSTOLIC BLOOD PRESSURE: 100 MMHG | OXYGEN SATURATION: 97 % | RESPIRATION RATE: 23 BRPM | DIASTOLIC BLOOD PRESSURE: 51 MMHG | HEART RATE: 116 BPM

## 2024-01-01 VITALS
SYSTOLIC BLOOD PRESSURE: 94 MMHG | TEMPERATURE: 98 F | RESPIRATION RATE: 18 BRPM | HEART RATE: 113 BPM | OXYGEN SATURATION: 94 % | WEIGHT: 179.9 LBS | HEIGHT: 74 IN | DIASTOLIC BLOOD PRESSURE: 62 MMHG

## 2024-01-01 VITALS
HEART RATE: 110 BPM | SYSTOLIC BLOOD PRESSURE: 102 MMHG | DIASTOLIC BLOOD PRESSURE: 64 MMHG | RESPIRATION RATE: 18 BRPM | WEIGHT: 210.1 LBS | HEIGHT: 74 IN | OXYGEN SATURATION: 96 % | TEMPERATURE: 97 F

## 2024-01-01 VITALS
TEMPERATURE: 97 F | RESPIRATION RATE: 18 BRPM | HEART RATE: 103 BPM | SYSTOLIC BLOOD PRESSURE: 120 MMHG | OXYGEN SATURATION: 99 % | DIASTOLIC BLOOD PRESSURE: 74 MMHG

## 2024-01-01 VITALS
RESPIRATION RATE: 23 BRPM | HEART RATE: 93 BPM | SYSTOLIC BLOOD PRESSURE: 100 MMHG | DIASTOLIC BLOOD PRESSURE: 55 MMHG | OXYGEN SATURATION: 95 %

## 2024-01-01 VITALS
OXYGEN SATURATION: 98 % | SYSTOLIC BLOOD PRESSURE: 132 MMHG | DIASTOLIC BLOOD PRESSURE: 62 MMHG | HEART RATE: 100 BPM | RESPIRATION RATE: 18 BRPM

## 2024-01-01 VITALS — OXYGEN SATURATION: 99 % | RESPIRATION RATE: 19 BRPM | HEART RATE: 90 BPM

## 2024-01-01 VITALS
OXYGEN SATURATION: 96 % | DIASTOLIC BLOOD PRESSURE: 60 MMHG | HEART RATE: 109 BPM | TEMPERATURE: 98 F | SYSTOLIC BLOOD PRESSURE: 131 MMHG | RESPIRATION RATE: 18 BRPM

## 2024-01-01 VITALS
TEMPERATURE: 99 F | SYSTOLIC BLOOD PRESSURE: 101 MMHG | WEIGHT: 214.95 LBS | HEART RATE: 111 BPM | DIASTOLIC BLOOD PRESSURE: 61 MMHG | RESPIRATION RATE: 17 BRPM | HEIGHT: 73 IN | OXYGEN SATURATION: 97 %

## 2024-01-01 VITALS
OXYGEN SATURATION: 98 % | SYSTOLIC BLOOD PRESSURE: 130 MMHG | DIASTOLIC BLOOD PRESSURE: 70 MMHG | HEART RATE: 96 BPM | TEMPERATURE: 98 F | RESPIRATION RATE: 18 BRPM

## 2024-01-01 VITALS
HEART RATE: 103 BPM | HEIGHT: 73 IN | DIASTOLIC BLOOD PRESSURE: 58 MMHG | WEIGHT: 210.1 LBS | OXYGEN SATURATION: 96 % | SYSTOLIC BLOOD PRESSURE: 111 MMHG | TEMPERATURE: 98 F | RESPIRATION RATE: 18 BRPM

## 2024-01-01 VITALS
TEMPERATURE: 98 F | OXYGEN SATURATION: 95 % | HEART RATE: 82 BPM | WEIGHT: 214.95 LBS | HEIGHT: 74 IN | RESPIRATION RATE: 18 BRPM | SYSTOLIC BLOOD PRESSURE: 113 MMHG | DIASTOLIC BLOOD PRESSURE: 69 MMHG

## 2024-01-01 VITALS — TEMPERATURE: 98 F

## 2024-01-01 VITALS
DIASTOLIC BLOOD PRESSURE: 97 MMHG | SYSTOLIC BLOOD PRESSURE: 123 MMHG | WEIGHT: 210.1 LBS | RESPIRATION RATE: 18 BRPM | HEIGHT: 74 IN | HEART RATE: 100 BPM | OXYGEN SATURATION: 100 %

## 2024-01-01 VITALS
HEART RATE: 72 BPM | SYSTOLIC BLOOD PRESSURE: 132 MMHG | DIASTOLIC BLOOD PRESSURE: 70 MMHG | RESPIRATION RATE: 18 BRPM | OXYGEN SATURATION: 98 %

## 2024-01-01 VITALS
DIASTOLIC BLOOD PRESSURE: 47 MMHG | OXYGEN SATURATION: 98 % | TEMPERATURE: 97 F | SYSTOLIC BLOOD PRESSURE: 79 MMHG | HEIGHT: 74 IN | WEIGHT: 210.1 LBS | HEART RATE: 106 BPM | RESPIRATION RATE: 18 BRPM

## 2024-01-01 VITALS
OXYGEN SATURATION: 91 % | SYSTOLIC BLOOD PRESSURE: 122 MMHG | DIASTOLIC BLOOD PRESSURE: 60 MMHG | RESPIRATION RATE: 19 BRPM | HEART RATE: 62 BPM

## 2024-01-01 VITALS
RESPIRATION RATE: 18 BRPM | DIASTOLIC BLOOD PRESSURE: 59 MMHG | OXYGEN SATURATION: 96 % | HEART RATE: 108 BPM | SYSTOLIC BLOOD PRESSURE: 120 MMHG

## 2024-01-01 VITALS
OXYGEN SATURATION: 99 % | RESPIRATION RATE: 20 BRPM | DIASTOLIC BLOOD PRESSURE: 56 MMHG | SYSTOLIC BLOOD PRESSURE: 103 MMHG | HEART RATE: 85 BPM

## 2024-01-01 DIAGNOSIS — N17.9 ACUTE KIDNEY FAILURE, UNSPECIFIED: ICD-10-CM

## 2024-01-01 DIAGNOSIS — E03.9 HYPOTHYROIDISM, UNSPECIFIED: ICD-10-CM

## 2024-01-01 DIAGNOSIS — J98.11 ATELECTASIS: ICD-10-CM

## 2024-01-01 DIAGNOSIS — I48.91 UNSPECIFIED ATRIAL FIBRILLATION: ICD-10-CM

## 2024-01-01 DIAGNOSIS — Z98.890 OTHER SPECIFIED POSTPROCEDURAL STATES: Chronic | ICD-10-CM

## 2024-01-01 DIAGNOSIS — F32.9 MAJOR DEPRESSIVE DISORDER, SINGLE EPISODE, UNSPECIFIED: ICD-10-CM

## 2024-01-01 DIAGNOSIS — D62 ACUTE POSTHEMORRHAGIC ANEMIA: ICD-10-CM

## 2024-01-01 DIAGNOSIS — J90 PLEURAL EFFUSION, NOT ELSEWHERE CLASSIFIED: ICD-10-CM

## 2024-01-01 DIAGNOSIS — D64.9 ANEMIA, UNSPECIFIED: ICD-10-CM

## 2024-01-01 DIAGNOSIS — K94.01 COLOSTOMY HEMORRHAGE: ICD-10-CM

## 2024-01-01 DIAGNOSIS — T81.83XA PERSISTENT POSTPROCEDURAL FISTULA, INITIAL ENCOUNTER: ICD-10-CM

## 2024-01-01 DIAGNOSIS — K94.11 ENTEROSTOMY HEMORRHAGE: ICD-10-CM

## 2024-01-01 DIAGNOSIS — N18.9 CHRONIC KIDNEY DISEASE, UNSPECIFIED: ICD-10-CM

## 2024-01-01 DIAGNOSIS — D84.9 IMMUNODEFICIENCY, UNSPECIFIED: ICD-10-CM

## 2024-01-01 DIAGNOSIS — Z90.49 ACQUIRED ABSENCE OF OTHER SPECIFIED PARTS OF DIGESTIVE TRACT: ICD-10-CM

## 2024-01-01 DIAGNOSIS — G47.00 INSOMNIA, UNSPECIFIED: ICD-10-CM

## 2024-01-01 DIAGNOSIS — F41.9 ANXIETY DISORDER, UNSPECIFIED: ICD-10-CM

## 2024-01-01 DIAGNOSIS — Z87.19 PERSONAL HISTORY OF OTHER DISEASES OF THE DIGESTIVE SYSTEM: ICD-10-CM

## 2024-01-01 DIAGNOSIS — I10 ESSENTIAL (PRIMARY) HYPERTENSION: ICD-10-CM

## 2024-01-01 DIAGNOSIS — K83.8 OTHER SPECIFIED DISEASES OF BILIARY TRACT: ICD-10-CM

## 2024-01-01 DIAGNOSIS — F32.A DEPRESSION, UNSPECIFIED: ICD-10-CM

## 2024-01-01 DIAGNOSIS — Z29.9 ENCOUNTER FOR PROPHYLACTIC MEASURES, UNSPECIFIED: ICD-10-CM

## 2024-01-01 DIAGNOSIS — Z79.890 HORMONE REPLACEMENT THERAPY: ICD-10-CM

## 2024-01-01 DIAGNOSIS — Z90.49 ACQUIRED ABSENCE OF OTHER SPECIFIED PARTS OF DIGESTIVE TRACT: Chronic | ICD-10-CM

## 2024-01-01 DIAGNOSIS — Z79.899 OTHER LONG TERM (CURRENT) DRUG THERAPY: ICD-10-CM

## 2024-01-01 DIAGNOSIS — I12.9 HYPERTENSIVE CHRONIC KIDNEY DISEASE WITH STAGE 1 THROUGH STAGE 4 CHRONIC KIDNEY DISEASE, OR UNSPECIFIED CHRONIC KIDNEY DISEASE: ICD-10-CM

## 2024-01-01 DIAGNOSIS — Z79.01 LONG TERM (CURRENT) USE OF ANTICOAGULANTS: ICD-10-CM

## 2024-01-01 DIAGNOSIS — E78.5 HYPERLIPIDEMIA, UNSPECIFIED: ICD-10-CM

## 2024-01-01 DIAGNOSIS — Z88.0 ALLERGY STATUS TO PENICILLIN: ICD-10-CM

## 2024-01-01 DIAGNOSIS — I48.92 UNSPECIFIED ATRIAL FLUTTER: ICD-10-CM

## 2024-01-01 DIAGNOSIS — Z71.89 OTHER SPECIFIED COUNSELING: ICD-10-CM

## 2024-01-01 DIAGNOSIS — K86.89 OTHER SPECIFIED DISEASES OF PANCREAS: ICD-10-CM

## 2024-01-01 DIAGNOSIS — K90.829 SHORT BOWEL SYNDROME, UNSPECIFIED: ICD-10-CM

## 2024-01-01 DIAGNOSIS — K86.2 CYST OF PANCREAS: ICD-10-CM

## 2024-01-01 DIAGNOSIS — K50.013 CROHN'S DISEASE OF SMALL INTESTINE WITH FISTULA: ICD-10-CM

## 2024-01-01 DIAGNOSIS — Z98.49 CATARACT EXTRACTION STATUS, UNSPECIFIED EYE: Chronic | ICD-10-CM

## 2024-01-01 DIAGNOSIS — A41.9 SEPSIS, UNSPECIFIED ORGANISM: ICD-10-CM

## 2024-01-01 DIAGNOSIS — N32.1 VESICOINTESTINAL FISTULA: ICD-10-CM

## 2024-01-01 DIAGNOSIS — K50.90 CROHN'S DISEASE, UNSPECIFIED, WITHOUT COMPLICATIONS: ICD-10-CM

## 2024-01-01 DIAGNOSIS — Z93.3 COLOSTOMY STATUS: ICD-10-CM

## 2024-01-01 DIAGNOSIS — K90.821 SHORT BOWEL SYNDROME WITH COLON IN CONTINUITY: ICD-10-CM

## 2024-01-01 DIAGNOSIS — R57.9 SHOCK, UNSPECIFIED: ICD-10-CM

## 2024-01-01 DIAGNOSIS — Z86.16 PERSONAL HISTORY OF COVID-19: ICD-10-CM

## 2024-01-01 DIAGNOSIS — Z86.718 PERSONAL HISTORY OF OTHER VENOUS THROMBOSIS AND EMBOLISM: ICD-10-CM

## 2024-01-01 DIAGNOSIS — R06.00 DYSPNEA, UNSPECIFIED: ICD-10-CM

## 2024-01-01 DIAGNOSIS — K63.2 FISTULA OF INTESTINE: ICD-10-CM

## 2024-01-01 DIAGNOSIS — E87.20 ACIDOSIS, UNSPECIFIED: ICD-10-CM

## 2024-01-01 DIAGNOSIS — E86.1 HYPOVOLEMIA: ICD-10-CM
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
## 2024-01-01 DIAGNOSIS — E83.42 HYPOMAGNESEMIA: ICD-10-CM

## 2024-01-01 DIAGNOSIS — I47.20 VENTRICULAR TACHYCARDIA, UNSPECIFIED: ICD-10-CM

## 2024-01-01 DIAGNOSIS — K76.6 PORTAL HYPERTENSION: ICD-10-CM

## 2024-01-01 DIAGNOSIS — T80.211A BLOODSTREAM INFECTION DUE TO CENTRAL VENOUS CATHETER, INITIAL ENCOUNTER: ICD-10-CM
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
## 2024-01-01 DIAGNOSIS — J96.91 RESPIRATORY FAILURE, UNSPECIFIED WITH HYPOXIA: ICD-10-CM

## 2024-01-01 DIAGNOSIS — E80.7 DISORDER OF BILIRUBIN METABOLISM, UNSPECIFIED: ICD-10-CM

## 2024-01-01 DIAGNOSIS — I27.20 PULMONARY HYPERTENSION, UNSPECIFIED: ICD-10-CM

## 2024-01-01 DIAGNOSIS — R10.9 UNSPECIFIED ABDOMINAL PAIN: ICD-10-CM

## 2024-01-01 DIAGNOSIS — K28.4 CHRONIC OR UNSPECIFIED GASTROJEJUNAL ULCER WITH HEMORRHAGE: ICD-10-CM

## 2024-01-01 DIAGNOSIS — I48.20 CHRONIC ATRIAL FIBRILLATION, UNSPECIFIED: ICD-10-CM

## 2024-01-01 DIAGNOSIS — U07.1 COVID-19: ICD-10-CM

## 2024-01-01 DIAGNOSIS — Z95.0 PRESENCE OF CARDIAC PACEMAKER: ICD-10-CM

## 2024-01-01 DIAGNOSIS — K91.2 POSTSURGICAL MALABSORPTION, NOT ELSEWHERE CLASSIFIED: ICD-10-CM

## 2024-01-01 DIAGNOSIS — R65.10 SYSTEMIC INFLAMMATORY RESPONSE SYNDROME (SIRS) OF NON-INFECTIOUS ORIGIN WITHOUT ACUTE ORGAN DYSFUNCTION: ICD-10-CM

## 2024-01-01 DIAGNOSIS — J15.211 PNEUMONIA DUE TO METHICILLIN SUSCEPTIBLE STAPHYLOCOCCUS AUREUS: ICD-10-CM

## 2024-01-01 DIAGNOSIS — I81 PORTAL VEIN THROMBOSIS: ICD-10-CM

## 2024-01-01 DIAGNOSIS — R06.02 SHORTNESS OF BREATH: ICD-10-CM

## 2024-01-01 DIAGNOSIS — D72.829 ELEVATED WHITE BLOOD CELL COUNT, UNSPECIFIED: ICD-10-CM
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
## 2024-01-01 DIAGNOSIS — J18.9 PNEUMONIA, UNSPECIFIED ORGANISM: ICD-10-CM

## 2024-01-01 DIAGNOSIS — R53.2 FUNCTIONAL QUADRIPLEGIA: ICD-10-CM

## 2024-01-01 DIAGNOSIS — I95.81 POSTPROCEDURAL HYPOTENSION: ICD-10-CM
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
## 2024-01-01 DIAGNOSIS — R79.9 ABNORMAL FINDING OF BLOOD CHEMISTRY, UNSPECIFIED: ICD-10-CM

## 2024-01-01 DIAGNOSIS — Z87.01 PERSONAL HISTORY OF PNEUMONIA (RECURRENT): ICD-10-CM

## 2024-01-01 DIAGNOSIS — R27.0 ATAXIA, UNSPECIFIED: ICD-10-CM

## 2024-01-01 DIAGNOSIS — R57.8 OTHER SHOCK: ICD-10-CM

## 2024-01-01 DIAGNOSIS — I24.89 OTHER FORMS OF ACUTE ISCHEMIC HEART DISEASE: ICD-10-CM

## 2024-01-01 DIAGNOSIS — Z93.2 ILEOSTOMY STATUS: ICD-10-CM

## 2024-01-01 DIAGNOSIS — K50.913 CROHN'S DISEASE, UNSPECIFIED, WITH FISTULA: ICD-10-CM

## 2024-01-01 DIAGNOSIS — R53.1 WEAKNESS: ICD-10-CM

## 2024-01-01 DIAGNOSIS — K72.90 HEPATIC FAILURE, UNSPECIFIED WITHOUT COMA: ICD-10-CM

## 2024-01-01 DIAGNOSIS — R78.81 BACTEREMIA: ICD-10-CM

## 2024-01-01 DIAGNOSIS — D13.6 BENIGN NEOPLASM OF PANCREAS: ICD-10-CM

## 2024-01-01 DIAGNOSIS — A41.01 SEPSIS DUE TO METHICILLIN SUSCEPTIBLE STAPHYLOCOCCUS AUREUS: ICD-10-CM

## 2024-01-01 DIAGNOSIS — Z51.5 ENCOUNTER FOR PALLIATIVE CARE: ICD-10-CM

## 2024-01-01 DIAGNOSIS — D68.9 COAGULATION DEFECT, UNSPECIFIED: ICD-10-CM

## 2024-01-01 DIAGNOSIS — E86.0 DEHYDRATION: ICD-10-CM

## 2024-01-01 DIAGNOSIS — K92.2 GASTROINTESTINAL HEMORRHAGE, UNSPECIFIED: ICD-10-CM

## 2024-01-01 LAB
% ALBUMIN: 28.5 % — SIGNIFICANT CHANGE UP
% ALPHA 1: 6.2 % — SIGNIFICANT CHANGE UP
% ALPHA 2: 7.3 % — SIGNIFICANT CHANGE UP
% BETA: 11.4 % — SIGNIFICANT CHANGE UP
% GAMMA, URINE: 19.2 % — SIGNIFICANT CHANGE UP
% GAMMA: 46.6 % — SIGNIFICANT CHANGE UP
% M SPIKE: 25.3 % — SIGNIFICANT CHANGE UP
-  CLINDAMYCIN: SIGNIFICANT CHANGE UP
-  CLINDAMYCIN: SIGNIFICANT CHANGE UP
-  ERYTHROMYCIN: SIGNIFICANT CHANGE UP
-  ERYTHROMYCIN: SIGNIFICANT CHANGE UP
-  GENTAMICIN: SIGNIFICANT CHANGE UP
-  LINEZOLID: SIGNIFICANT CHANGE UP
-  OXACILLIN: SIGNIFICANT CHANGE UP
-  OXACILLIN: SIGNIFICANT CHANGE UP
-  PENICILLIN: SIGNIFICANT CHANGE UP
-  RIFAMPIN: SIGNIFICANT CHANGE UP
-  RIFAMPIN: SIGNIFICANT CHANGE UP
-  TETRACYCLINE: SIGNIFICANT CHANGE UP
-  TETRACYCLINE: SIGNIFICANT CHANGE UP
-  TRIMETHOPRIM/SULFAMETHOXAZOLE: SIGNIFICANT CHANGE UP
-  TRIMETHOPRIM/SULFAMETHOXAZOLE: SIGNIFICANT CHANGE UP
-  VANCOMYCIN: SIGNIFICANT CHANGE UP
-  VANCOMYCIN: SIGNIFICANT CHANGE UP
24R-OH-CALCIDIOL SERPL-MCNC: <6 NG/ML — LOW (ref 30–80)
50/50 COAG PANEL: SIGNIFICANT CHANGE UP
A1C WITH ESTIMATED AVERAGE GLUCOSE RESULT: 5.3 % — SIGNIFICANT CHANGE UP (ref 4–5.6)
A1C WITH ESTIMATED AVERAGE GLUCOSE RESULT: <4.2 % — SIGNIFICANT CHANGE UP (ref 4–5.6)
ACANTHOCYTES BLD QL SMEAR: SLIGHT — SIGNIFICANT CHANGE UP
ADD ON TEST-SPECIMEN IN LAB: SIGNIFICANT CHANGE UP
ALBUMIN 24H MFR UR ELPH: 12.8 % — SIGNIFICANT CHANGE UP
ALBUMIN FLD-MCNC: 0.4 G/DL — SIGNIFICANT CHANGE UP
ALBUMIN SERPL ELPH-MCNC: 1.3 G/DL — LOW (ref 3.3–5)
ALBUMIN SERPL ELPH-MCNC: 1.4 G/DL — LOW (ref 3.3–5)
ALBUMIN SERPL ELPH-MCNC: 1.6 G/DL — LOW (ref 3.3–5)
ALBUMIN SERPL ELPH-MCNC: 1.7 G/DL — LOW (ref 3.3–5)
ALBUMIN SERPL ELPH-MCNC: 1.7 G/DL — LOW (ref 3.3–5)
ALBUMIN SERPL ELPH-MCNC: 1.8 G/DL — LOW (ref 3.3–5)
ALBUMIN SERPL ELPH-MCNC: 1.9 G/DL — LOW (ref 3.3–5)
ALBUMIN SERPL ELPH-MCNC: 2 G/DL — LOW (ref 3.3–5)
ALBUMIN SERPL ELPH-MCNC: 2.1 G/DL — LOW (ref 3.3–5)
ALBUMIN SERPL ELPH-MCNC: 2.2 G/DL — LOW (ref 3.3–5)
ALBUMIN SERPL ELPH-MCNC: 2.3 G/DL — LOW (ref 3.3–5)
ALBUMIN SERPL ELPH-MCNC: 2.4 G/DL — LOW (ref 3.3–5)
ALBUMIN SERPL ELPH-MCNC: 2.4 G/DL — LOW (ref 3.6–5.5)
ALBUMIN SERPL ELPH-MCNC: 2.5 G/DL — LOW (ref 3.3–5)
ALBUMIN SERPL ELPH-MCNC: 2.6 G/DL — LOW (ref 3.3–5)
ALBUMIN SERPL ELPH-MCNC: 2.6 G/DL — LOW (ref 3.3–5)
ALBUMIN SERPL ELPH-MCNC: 2.7 G/DL — LOW (ref 3.3–5)
ALBUMIN SERPL ELPH-MCNC: 2.8 G/DL — LOW (ref 3.3–5)
ALBUMIN SERPL ELPH-MCNC: 2.8 G/DL — LOW (ref 3.3–5)
ALBUMIN SERPL ELPH-MCNC: 2.9 G/DL — LOW (ref 3.3–5)
ALBUMIN SERPL ELPH-MCNC: 3 G/DL — LOW (ref 3.3–5)
ALBUMIN SERPL ELPH-MCNC: 3 G/DL — LOW (ref 3.3–5)
ALBUMIN SERPL ELPH-MCNC: 3.1 G/DL — LOW (ref 3.3–5)
ALBUMIN SERPL ELPH-MCNC: 3.1 G/DL — LOW (ref 3.3–5)
ALBUMIN SERPL ELPH-MCNC: 3.2 G/DL — LOW (ref 3.3–5)
ALBUMIN/GLOB SERPL ELPH: 0.4 RATIO — SIGNIFICANT CHANGE UP
ALP SERPL-CCNC: 100 U/L — SIGNIFICANT CHANGE UP (ref 40–120)
ALP SERPL-CCNC: 101 U/L — SIGNIFICANT CHANGE UP (ref 40–120)
ALP SERPL-CCNC: 102 U/L — SIGNIFICANT CHANGE UP (ref 40–120)
ALP SERPL-CCNC: 103 U/L — SIGNIFICANT CHANGE UP (ref 40–120)
ALP SERPL-CCNC: 104 U/L — SIGNIFICANT CHANGE UP (ref 40–120)
ALP SERPL-CCNC: 105 U/L — SIGNIFICANT CHANGE UP (ref 40–120)
ALP SERPL-CCNC: 105 U/L — SIGNIFICANT CHANGE UP (ref 40–120)
ALP SERPL-CCNC: 109 U/L — SIGNIFICANT CHANGE UP (ref 40–120)
ALP SERPL-CCNC: 109 U/L — SIGNIFICANT CHANGE UP (ref 40–120)
ALP SERPL-CCNC: 110 U/L — SIGNIFICANT CHANGE UP (ref 40–120)
ALP SERPL-CCNC: 111 U/L — SIGNIFICANT CHANGE UP (ref 40–120)
ALP SERPL-CCNC: 114 U/L — SIGNIFICANT CHANGE UP (ref 40–120)
ALP SERPL-CCNC: 115 U/L — SIGNIFICANT CHANGE UP (ref 40–120)
ALP SERPL-CCNC: 117 U/L — SIGNIFICANT CHANGE UP (ref 40–120)
ALP SERPL-CCNC: 118 U/L — SIGNIFICANT CHANGE UP (ref 40–120)
ALP SERPL-CCNC: 120 U/L — SIGNIFICANT CHANGE UP (ref 40–120)
ALP SERPL-CCNC: 122 U/L — HIGH (ref 40–120)
ALP SERPL-CCNC: 122 U/L — HIGH (ref 40–120)
ALP SERPL-CCNC: 123 U/L — HIGH (ref 40–120)
ALP SERPL-CCNC: 124 U/L — HIGH (ref 40–120)
ALP SERPL-CCNC: 125 U/L — HIGH (ref 40–120)
ALP SERPL-CCNC: 125 U/L — HIGH (ref 40–120)
ALP SERPL-CCNC: 127 U/L — HIGH (ref 40–120)
ALP SERPL-CCNC: 129 U/L — HIGH (ref 40–120)
ALP SERPL-CCNC: 131 U/L — HIGH (ref 40–120)
ALP SERPL-CCNC: 140 U/L — HIGH (ref 40–120)
ALP SERPL-CCNC: 152 U/L — HIGH (ref 40–120)
ALP SERPL-CCNC: 63 U/L — SIGNIFICANT CHANGE UP (ref 40–120)
ALP SERPL-CCNC: 64 U/L — SIGNIFICANT CHANGE UP (ref 40–120)
ALP SERPL-CCNC: 68 U/L — SIGNIFICANT CHANGE UP (ref 40–120)
ALP SERPL-CCNC: 70 U/L — SIGNIFICANT CHANGE UP (ref 40–120)
ALP SERPL-CCNC: 70 U/L — SIGNIFICANT CHANGE UP (ref 40–120)
ALP SERPL-CCNC: 72 U/L — SIGNIFICANT CHANGE UP (ref 40–120)
ALP SERPL-CCNC: 74 U/L — SIGNIFICANT CHANGE UP (ref 40–120)
ALP SERPL-CCNC: 74 U/L — SIGNIFICANT CHANGE UP (ref 40–120)
ALP SERPL-CCNC: 82 U/L — SIGNIFICANT CHANGE UP (ref 40–120)
ALP SERPL-CCNC: 83 U/L — SIGNIFICANT CHANGE UP (ref 40–120)
ALP SERPL-CCNC: 84 U/L — SIGNIFICANT CHANGE UP (ref 40–120)
ALP SERPL-CCNC: 85 U/L — SIGNIFICANT CHANGE UP (ref 40–120)
ALP SERPL-CCNC: 87 U/L — SIGNIFICANT CHANGE UP (ref 40–120)
ALP SERPL-CCNC: 88 U/L — SIGNIFICANT CHANGE UP (ref 40–120)
ALP SERPL-CCNC: 89 U/L — SIGNIFICANT CHANGE UP (ref 40–120)
ALP SERPL-CCNC: 90 U/L — SIGNIFICANT CHANGE UP (ref 40–120)
ALP SERPL-CCNC: 90 U/L — SIGNIFICANT CHANGE UP (ref 40–120)
ALP SERPL-CCNC: 91 U/L — SIGNIFICANT CHANGE UP (ref 40–120)
ALP SERPL-CCNC: 92 U/L — SIGNIFICANT CHANGE UP (ref 40–120)
ALP SERPL-CCNC: 94 U/L — SIGNIFICANT CHANGE UP (ref 40–120)
ALP SERPL-CCNC: 95 U/L — SIGNIFICANT CHANGE UP (ref 40–120)
ALP SERPL-CCNC: 96 U/L — SIGNIFICANT CHANGE UP (ref 40–120)
ALP SERPL-CCNC: 97 U/L — SIGNIFICANT CHANGE UP (ref 40–120)
ALP SERPL-CCNC: 98 U/L — SIGNIFICANT CHANGE UP (ref 40–120)
ALP SERPL-CCNC: 99 U/L — SIGNIFICANT CHANGE UP (ref 40–120)
ALPHA1 GLOB 24H MFR UR ELPH: 30.4 % — SIGNIFICANT CHANGE UP
ALPHA1 GLOB SERPL ELPH-MCNC: 0.5 G/DL — HIGH (ref 0.1–0.4)
ALPHA2 GLOB 24H MFR UR ELPH: 18.4 % — SIGNIFICANT CHANGE UP
ALPHA2 GLOB SERPL ELPH-MCNC: 0.6 G/DL — SIGNIFICANT CHANGE UP (ref 0.5–1)
ALT FLD-CCNC: 101 U/L — HIGH (ref 10–45)
ALT FLD-CCNC: 103 U/L — HIGH (ref 10–45)
ALT FLD-CCNC: 106 U/L — HIGH (ref 10–45)
ALT FLD-CCNC: 119 U/L — HIGH (ref 10–45)
ALT FLD-CCNC: 120 U/L — HIGH (ref 10–45)
ALT FLD-CCNC: 125 U/L — HIGH (ref 10–45)
ALT FLD-CCNC: 131 U/L — HIGH (ref 10–45)
ALT FLD-CCNC: 133 U/L — HIGH (ref 10–45)
ALT FLD-CCNC: 137 U/L — HIGH (ref 10–45)
ALT FLD-CCNC: 140 U/L — HIGH (ref 10–45)
ALT FLD-CCNC: 145 U/L — HIGH (ref 10–45)
ALT FLD-CCNC: 148 U/L — HIGH (ref 10–45)
ALT FLD-CCNC: 148 U/L — HIGH (ref 10–45)
ALT FLD-CCNC: 154 U/L — HIGH (ref 10–45)
ALT FLD-CCNC: 155 U/L — HIGH (ref 10–45)
ALT FLD-CCNC: 156 U/L — HIGH (ref 10–45)
ALT FLD-CCNC: 157 U/L — HIGH (ref 10–45)
ALT FLD-CCNC: 159 U/L — HIGH (ref 10–45)
ALT FLD-CCNC: 159 U/L — HIGH (ref 10–45)
ALT FLD-CCNC: 162 U/L — HIGH (ref 10–45)
ALT FLD-CCNC: 166 U/L — HIGH (ref 10–45)
ALT FLD-CCNC: 167 U/L — HIGH (ref 10–45)
ALT FLD-CCNC: 168 U/L — HIGH (ref 10–45)
ALT FLD-CCNC: 169 U/L — HIGH (ref 10–45)
ALT FLD-CCNC: 170 U/L — HIGH (ref 10–45)
ALT FLD-CCNC: 178 U/L — HIGH (ref 10–45)
ALT FLD-CCNC: 183 U/L — HIGH (ref 10–45)
ALT FLD-CCNC: 184 U/L — HIGH (ref 10–45)
ALT FLD-CCNC: 192 U/L — HIGH (ref 10–45)
ALT FLD-CCNC: 197 U/L — HIGH (ref 10–45)
ALT FLD-CCNC: 210 U/L — HIGH (ref 10–45)
ALT FLD-CCNC: 222 U/L — HIGH (ref 10–45)
ALT FLD-CCNC: 223 U/L — HIGH (ref 10–45)
ALT FLD-CCNC: 227 U/L — HIGH (ref 10–45)
ALT FLD-CCNC: 231 U/L — HIGH (ref 10–45)
ALT FLD-CCNC: 239 U/L — HIGH (ref 10–45)
ALT FLD-CCNC: 28 U/L — SIGNIFICANT CHANGE UP (ref 10–45)
ALT FLD-CCNC: 30 U/L — SIGNIFICANT CHANGE UP (ref 10–45)
ALT FLD-CCNC: 31 U/L — SIGNIFICANT CHANGE UP (ref 10–45)
ALT FLD-CCNC: 33 U/L — SIGNIFICANT CHANGE UP (ref 10–45)
ALT FLD-CCNC: 34 U/L — SIGNIFICANT CHANGE UP (ref 10–45)
ALT FLD-CCNC: 35 U/L — SIGNIFICANT CHANGE UP (ref 10–45)
ALT FLD-CCNC: 37 U/L — SIGNIFICANT CHANGE UP (ref 10–45)
ALT FLD-CCNC: 39 U/L — SIGNIFICANT CHANGE UP (ref 10–45)
ALT FLD-CCNC: 41 U/L — SIGNIFICANT CHANGE UP (ref 10–45)
ALT FLD-CCNC: 44 U/L — SIGNIFICANT CHANGE UP (ref 10–45)
ALT FLD-CCNC: 47 U/L — HIGH (ref 10–45)
ALT FLD-CCNC: 53 U/L — HIGH (ref 10–45)
ALT FLD-CCNC: 55 U/L — HIGH (ref 10–45)
ALT FLD-CCNC: 56 U/L — HIGH (ref 10–45)
ALT FLD-CCNC: 57 U/L — HIGH (ref 10–45)
ALT FLD-CCNC: 58 U/L — HIGH (ref 10–45)
ALT FLD-CCNC: 60 U/L — HIGH (ref 10–45)
ALT FLD-CCNC: 60 U/L — HIGH (ref 10–45)
ALT FLD-CCNC: 61 U/L — HIGH (ref 10–45)
ALT FLD-CCNC: 61 U/L — HIGH (ref 10–45)
ALT FLD-CCNC: 62 U/L — HIGH (ref 10–45)
ALT FLD-CCNC: 63 U/L — HIGH (ref 10–45)
ALT FLD-CCNC: 64 U/L — HIGH (ref 10–45)
ALT FLD-CCNC: 66 U/L — HIGH (ref 10–45)
ALT FLD-CCNC: 67 U/L — HIGH (ref 10–45)
ALT FLD-CCNC: 68 U/L — HIGH (ref 10–45)
ALT FLD-CCNC: 68 U/L — HIGH (ref 10–45)
ALT FLD-CCNC: 70 U/L — HIGH (ref 10–45)
ALT FLD-CCNC: 70 U/L — HIGH (ref 10–45)
ALT FLD-CCNC: 71 U/L — HIGH (ref 10–45)
ALT FLD-CCNC: 72 U/L — HIGH (ref 10–45)
ALT FLD-CCNC: 72 U/L — HIGH (ref 10–45)
ALT FLD-CCNC: 74 U/L — HIGH (ref 10–45)
ALT FLD-CCNC: 77 U/L — HIGH (ref 10–45)
ALT FLD-CCNC: 78 U/L — HIGH (ref 10–45)
ALT FLD-CCNC: 79 U/L — HIGH (ref 10–45)
ALT FLD-CCNC: 83 U/L — HIGH (ref 10–45)
ALT FLD-CCNC: 88 U/L — HIGH (ref 10–45)
ALT FLD-CCNC: 90 U/L — HIGH (ref 10–45)
ALT FLD-CCNC: 91 U/L — HIGH (ref 10–45)
ALT FLD-CCNC: 93 U/L — HIGH (ref 10–45)
ALT FLD-CCNC: 93 U/L — HIGH (ref 10–45)
ALT FLD-CCNC: 94 U/L — HIGH (ref 10–45)
ALT FLD-CCNC: 96 U/L — HIGH (ref 10–45)
ALT FLD-CCNC: SIGNIFICANT CHANGE UP (ref 10–45)
ALT FLD-CCNC: SIGNIFICANT CHANGE UP (ref 10–45)
ALT FLD-CCNC: SIGNIFICANT CHANGE UP U/L (ref 10–45)
AMMONIA BLD-MCNC: 27 UMOL/L — SIGNIFICANT CHANGE UP (ref 11–55)
AMORPH SED URNS QL MICRO: PRESENT
AMORPH URATE CRY # URNS: PRESENT
ANION GAP SERPL CALC-SCNC: 10 MMOL/L — SIGNIFICANT CHANGE UP (ref 5–17)
ANION GAP SERPL CALC-SCNC: 11 MMOL/L — SIGNIFICANT CHANGE UP (ref 5–17)
ANION GAP SERPL CALC-SCNC: 12 MMOL/L — SIGNIFICANT CHANGE UP (ref 5–17)
ANION GAP SERPL CALC-SCNC: 13 MMOL/L — SIGNIFICANT CHANGE UP (ref 5–17)
ANION GAP SERPL CALC-SCNC: 14 MMOL/L — SIGNIFICANT CHANGE UP (ref 5–17)
ANION GAP SERPL CALC-SCNC: 15 MMOL/L — SIGNIFICANT CHANGE UP (ref 5–17)
ANION GAP SERPL CALC-SCNC: 2 MMOL/L — LOW (ref 5–17)
ANION GAP SERPL CALC-SCNC: 4 MMOL/L — LOW (ref 5–17)
ANION GAP SERPL CALC-SCNC: 4 MMOL/L — LOW (ref 5–17)
ANION GAP SERPL CALC-SCNC: 5 MMOL/L — SIGNIFICANT CHANGE UP (ref 5–17)
ANION GAP SERPL CALC-SCNC: 6 MMOL/L — SIGNIFICANT CHANGE UP (ref 5–17)
ANION GAP SERPL CALC-SCNC: 7 MMOL/L — SIGNIFICANT CHANGE UP (ref 5–17)
ANION GAP SERPL CALC-SCNC: 8 MMOL/L — SIGNIFICANT CHANGE UP (ref 5–17)
ANION GAP SERPL CALC-SCNC: 9 MMOL/L — SIGNIFICANT CHANGE UP (ref 5–17)
ANISOCYTOSIS BLD QL: SIGNIFICANT CHANGE UP
ANISOCYTOSIS BLD QL: SLIGHT — SIGNIFICANT CHANGE UP
APPEARANCE UR: ABNORMAL
APPEARANCE UR: CLEAR — SIGNIFICANT CHANGE UP
APTT BLD: 28.1 SEC — SIGNIFICANT CHANGE UP (ref 24.5–35.6)
APTT BLD: 29.2 SEC — SIGNIFICANT CHANGE UP (ref 24.5–35.6)
APTT BLD: 29.4 SEC — SIGNIFICANT CHANGE UP (ref 24.5–35.6)
APTT BLD: 29.5 SEC — SIGNIFICANT CHANGE UP (ref 24.5–35.6)
APTT BLD: 30.5 SEC — SIGNIFICANT CHANGE UP (ref 24.5–35.6)
APTT BLD: 30.6 SEC — SIGNIFICANT CHANGE UP (ref 24.5–35.6)
APTT BLD: 31.2 SEC — SIGNIFICANT CHANGE UP (ref 24.5–35.6)
APTT BLD: 31.4 SEC — SIGNIFICANT CHANGE UP (ref 24.5–35.6)
APTT BLD: 31.8 SEC — SIGNIFICANT CHANGE UP (ref 24.5–35.6)
APTT BLD: 31.9 SEC — SIGNIFICANT CHANGE UP (ref 24.5–35.6)
APTT BLD: 32 SEC — SIGNIFICANT CHANGE UP (ref 24.5–35.6)
APTT BLD: 32 SEC — SIGNIFICANT CHANGE UP (ref 24.5–35.6)
APTT BLD: 32.2 SEC — SIGNIFICANT CHANGE UP (ref 24.5–35.6)
APTT BLD: 32.5 SEC — SIGNIFICANT CHANGE UP (ref 24.5–35.6)
APTT BLD: 32.6 SEC — SIGNIFICANT CHANGE UP (ref 24.5–35.6)
APTT BLD: 33.2 SEC — SIGNIFICANT CHANGE UP (ref 24.5–35.6)
APTT BLD: 33.5 SEC — SIGNIFICANT CHANGE UP (ref 24.5–35.6)
APTT BLD: 33.9 SEC — SIGNIFICANT CHANGE UP (ref 24.5–35.6)
APTT BLD: 33.9 SEC — SIGNIFICANT CHANGE UP (ref 24.5–35.6)
APTT BLD: 34.1 SEC — SIGNIFICANT CHANGE UP (ref 24.5–35.6)
APTT BLD: 34.2 SEC — SIGNIFICANT CHANGE UP (ref 24.5–35.6)
APTT BLD: 34.4 SEC — SIGNIFICANT CHANGE UP (ref 24.5–35.6)
APTT BLD: 34.5 SEC — SIGNIFICANT CHANGE UP (ref 24.5–35.6)
APTT BLD: 34.6 SEC — SIGNIFICANT CHANGE UP (ref 24.5–35.6)
APTT BLD: 34.6 SEC — SIGNIFICANT CHANGE UP (ref 24.5–35.6)
APTT BLD: 35.2 SEC — SIGNIFICANT CHANGE UP (ref 24.5–35.6)
APTT BLD: 36.3 SEC — HIGH (ref 24.5–35.6)
APTT BLD: 36.6 SEC — HIGH (ref 24.5–35.6)
APTT BLD: 36.9 SEC — HIGH (ref 24.5–35.6)
APTT BLD: 37.1 SEC — HIGH (ref 24.5–35.6)
APTT BLD: 38.4 SEC — HIGH (ref 24.5–35.6)
APTT BLD: 38.7 SEC — HIGH (ref 24.5–35.6)
APTT BLD: 39.6 SEC — HIGH (ref 24.5–35.6)
APTT BLD: 40.3 SEC — HIGH (ref 24.5–35.6)
APTT BLD: 40.3 SEC — HIGH (ref 24.5–35.6)
APTT BLD: 40.5 SEC — HIGH (ref 24.5–35.6)
APTT BLD: 40.5 SEC — HIGH (ref 24.5–35.6)
APTT BLD: 40.6 SEC — HIGH (ref 24.5–35.6)
APTT BLD: 40.8 SEC — HIGH (ref 24.5–35.6)
APTT BLD: 41.6 SEC — HIGH (ref 24.5–35.6)
APTT BLD: 43.9 SEC — HIGH (ref 24.5–35.6)
AST SERPL-CCNC: 103 U/L — HIGH (ref 10–40)
AST SERPL-CCNC: 103 U/L — HIGH (ref 10–40)
AST SERPL-CCNC: 104 U/L — HIGH (ref 10–40)
AST SERPL-CCNC: 105 U/L — HIGH (ref 10–40)
AST SERPL-CCNC: 106 U/L — HIGH (ref 10–40)
AST SERPL-CCNC: 106 U/L — HIGH (ref 10–40)
AST SERPL-CCNC: 108 U/L — HIGH (ref 10–40)
AST SERPL-CCNC: 108 U/L — HIGH (ref 10–40)
AST SERPL-CCNC: 109 U/L — HIGH (ref 10–40)
AST SERPL-CCNC: 110 U/L — HIGH (ref 10–40)
AST SERPL-CCNC: 111 U/L — HIGH (ref 10–40)
AST SERPL-CCNC: 113 U/L — HIGH (ref 10–40)
AST SERPL-CCNC: 113 U/L — HIGH (ref 10–40)
AST SERPL-CCNC: 124 U/L — HIGH (ref 10–40)
AST SERPL-CCNC: 127 U/L — HIGH (ref 10–40)
AST SERPL-CCNC: 127 U/L — HIGH (ref 10–40)
AST SERPL-CCNC: 144 U/L — HIGH (ref 10–40)
AST SERPL-CCNC: 144 U/L — HIGH (ref 10–40)
AST SERPL-CCNC: 147 U/L — HIGH (ref 10–40)
AST SERPL-CCNC: 150 U/L — HIGH (ref 10–40)
AST SERPL-CCNC: 153 U/L — HIGH (ref 10–40)
AST SERPL-CCNC: 161 U/L — HIGH (ref 10–40)
AST SERPL-CCNC: 167 U/L — HIGH (ref 10–40)
AST SERPL-CCNC: 168 U/L — HIGH (ref 10–40)
AST SERPL-CCNC: 169 U/L — HIGH (ref 10–40)
AST SERPL-CCNC: 173 U/L — HIGH (ref 10–40)
AST SERPL-CCNC: 180 U/L — HIGH (ref 10–40)
AST SERPL-CCNC: 182 U/L — HIGH (ref 10–40)
AST SERPL-CCNC: 182 U/L — HIGH (ref 10–40)
AST SERPL-CCNC: 183 U/L — HIGH (ref 10–40)
AST SERPL-CCNC: 185 U/L — HIGH (ref 10–40)
AST SERPL-CCNC: 189 U/L — HIGH (ref 10–40)
AST SERPL-CCNC: 207 U/L — HIGH (ref 10–40)
AST SERPL-CCNC: 212 U/L — HIGH (ref 10–40)
AST SERPL-CCNC: 221 U/L — HIGH (ref 10–40)
AST SERPL-CCNC: 232 U/L — HIGH (ref 10–40)
AST SERPL-CCNC: 236 U/L — HIGH (ref 10–40)
AST SERPL-CCNC: 241 U/L — HIGH (ref 10–40)
AST SERPL-CCNC: 257 U/L — HIGH (ref 10–40)
AST SERPL-CCNC: 260 U/L — HIGH (ref 10–40)
AST SERPL-CCNC: 263 U/L — HIGH (ref 10–40)
AST SERPL-CCNC: 268 U/L — HIGH (ref 10–40)
AST SERPL-CCNC: 278 U/L — HIGH (ref 10–40)
AST SERPL-CCNC: 280 U/L — HIGH (ref 10–40)
AST SERPL-CCNC: 281 U/L — HIGH (ref 10–40)
AST SERPL-CCNC: 287 U/L — HIGH (ref 10–40)
AST SERPL-CCNC: 290 U/L — HIGH (ref 10–40)
AST SERPL-CCNC: 292 U/L — HIGH (ref 10–40)
AST SERPL-CCNC: 293 U/L — HIGH (ref 10–40)
AST SERPL-CCNC: 300 U/L — HIGH (ref 10–40)
AST SERPL-CCNC: 305 U/L — HIGH (ref 10–40)
AST SERPL-CCNC: 310 U/L — HIGH (ref 10–40)
AST SERPL-CCNC: 311 U/L — HIGH (ref 10–40)
AST SERPL-CCNC: 312 U/L — HIGH (ref 10–40)
AST SERPL-CCNC: 312 U/L — HIGH (ref 10–40)
AST SERPL-CCNC: 315 U/L — HIGH (ref 10–40)
AST SERPL-CCNC: 316 U/L — HIGH (ref 10–40)
AST SERPL-CCNC: 316 U/L — HIGH (ref 10–40)
AST SERPL-CCNC: 319 U/L — HIGH (ref 10–40)
AST SERPL-CCNC: 326 U/L — HIGH (ref 10–40)
AST SERPL-CCNC: 326 U/L — HIGH (ref 10–40)
AST SERPL-CCNC: 330 U/L — HIGH (ref 10–40)
AST SERPL-CCNC: 330 U/L — HIGH (ref 10–40)
AST SERPL-CCNC: 347 U/L — HIGH (ref 10–40)
AST SERPL-CCNC: 349 U/L — HIGH (ref 10–40)
AST SERPL-CCNC: 358 U/L — HIGH (ref 10–40)
AST SERPL-CCNC: 360 U/L — HIGH (ref 10–40)
AST SERPL-CCNC: 365 U/L — HIGH (ref 10–40)
AST SERPL-CCNC: 374 U/L — HIGH (ref 10–40)
AST SERPL-CCNC: 378 U/L — HIGH (ref 10–40)
AST SERPL-CCNC: 383 U/L — HIGH (ref 10–40)
AST SERPL-CCNC: 409 U/L — HIGH (ref 10–40)
AST SERPL-CCNC: 416 U/L — HIGH (ref 10–40)
AST SERPL-CCNC: 423 U/L — HIGH (ref 10–40)
AST SERPL-CCNC: 424 U/L — HIGH (ref 10–40)
AST SERPL-CCNC: 87 U/L — HIGH (ref 10–40)
AST SERPL-CCNC: 88 U/L — HIGH (ref 10–40)
AST SERPL-CCNC: 90 U/L — HIGH (ref 10–40)
AST SERPL-CCNC: 93 U/L — HIGH (ref 10–40)
AST SERPL-CCNC: 94 U/L — HIGH (ref 10–40)
AST SERPL-CCNC: 96 U/L — HIGH (ref 10–40)
AST SERPL-CCNC: 96 U/L — HIGH (ref 10–40)
AST SERPL-CCNC: 97 U/L — HIGH (ref 10–40)
AST SERPL-CCNC: 99 U/L — HIGH (ref 10–40)
AST SERPL-CCNC: SIGNIFICANT CHANGE UP (ref 10–40)
AST SERPL-CCNC: SIGNIFICANT CHANGE UP U/L (ref 10–40)
B PERT IGG+IGM PNL SER: SIGNIFICANT CHANGE UP
B-GLOBULIN 24H MFR UR ELPH: 19.2 % — SIGNIFICANT CHANGE UP
B-GLOBULIN SERPL ELPH-MCNC: 1 G/DL — SIGNIFICANT CHANGE UP (ref 0.5–1)
BACTERIA # UR AUTO: ABNORMAL /HPF
BACTERIA # UR AUTO: NEGATIVE /HPF — SIGNIFICANT CHANGE UP
BASE EXCESS BLDA CALC-SCNC: -4.9 MMOL/L — LOW (ref -2–3)
BASE EXCESS BLDA CALC-SCNC: 12.6 MMOL/L — HIGH (ref -2–3)
BASE EXCESS BLDA CALC-SCNC: 12.7 MMOL/L — HIGH (ref -2–3)
BASE EXCESS BLDV CALC-SCNC: 0.8 MMOL/L — SIGNIFICANT CHANGE UP (ref -2–3)
BASOPHILS # BLD AUTO: 0 K/UL — SIGNIFICANT CHANGE UP (ref 0–0.2)
BASOPHILS # BLD AUTO: 0.01 K/UL — SIGNIFICANT CHANGE UP (ref 0–0.2)
BASOPHILS # BLD AUTO: 0.02 K/UL — SIGNIFICANT CHANGE UP (ref 0–0.2)
BASOPHILS # BLD AUTO: 0.03 K/UL — SIGNIFICANT CHANGE UP (ref 0–0.2)
BASOPHILS # BLD AUTO: 0.04 K/UL — SIGNIFICANT CHANGE UP (ref 0–0.2)
BASOPHILS # BLD AUTO: 0.05 K/UL — SIGNIFICANT CHANGE UP (ref 0–0.2)
BASOPHILS # BLD AUTO: 0.06 K/UL — SIGNIFICANT CHANGE UP (ref 0–0.2)
BASOPHILS # BLD AUTO: 0.07 K/UL — SIGNIFICANT CHANGE UP (ref 0–0.2)
BASOPHILS # BLD AUTO: 0.07 K/UL — SIGNIFICANT CHANGE UP (ref 0–0.2)
BASOPHILS # BLD AUTO: 0.08 K/UL — SIGNIFICANT CHANGE UP (ref 0–0.2)
BASOPHILS # BLD AUTO: 0.09 K/UL — SIGNIFICANT CHANGE UP (ref 0–0.2)
BASOPHILS # BLD AUTO: 0.1 K/UL — SIGNIFICANT CHANGE UP (ref 0–0.2)
BASOPHILS # BLD AUTO: 0.28 K/UL — HIGH (ref 0–0.2)
BASOPHILS NFR BLD AUTO: 0 % — SIGNIFICANT CHANGE UP (ref 0–2)
BASOPHILS NFR BLD AUTO: 0.1 % — SIGNIFICANT CHANGE UP (ref 0–2)
BASOPHILS NFR BLD AUTO: 0.2 % — SIGNIFICANT CHANGE UP (ref 0–2)
BASOPHILS NFR BLD AUTO: 0.3 % — SIGNIFICANT CHANGE UP (ref 0–2)
BASOPHILS NFR BLD AUTO: 0.4 % — SIGNIFICANT CHANGE UP (ref 0–2)
BASOPHILS NFR BLD AUTO: 0.5 % — SIGNIFICANT CHANGE UP (ref 0–2)
BASOPHILS NFR BLD AUTO: 0.6 % — SIGNIFICANT CHANGE UP (ref 0–2)
BASOPHILS NFR BLD AUTO: 0.7 % — SIGNIFICANT CHANGE UP (ref 0–2)
BASOPHILS NFR BLD AUTO: 0.8 % — SIGNIFICANT CHANGE UP (ref 0–2)
BASOPHILS NFR BLD AUTO: 0.8 % — SIGNIFICANT CHANGE UP (ref 0–2)
BASOPHILS NFR BLD AUTO: 0.9 % — SIGNIFICANT CHANGE UP (ref 0–2)
BASOPHILS NFR BLD AUTO: 1 % — SIGNIFICANT CHANGE UP (ref 0–2)
BASOPHILS NFR BLD AUTO: 1 % — SIGNIFICANT CHANGE UP (ref 0–2)
BASOPHILS NFR BLD AUTO: 2.6 % — HIGH (ref 0–2)
BILIRUB DIRECT SERPL-MCNC: 2.5 MG/DL — HIGH (ref 0–0.3)
BILIRUB DIRECT SERPL-MCNC: 2.7 MG/DL — HIGH (ref 0–0.3)
BILIRUB DIRECT SERPL-MCNC: 2.7 MG/DL — HIGH (ref 0–0.3)
BILIRUB DIRECT SERPL-MCNC: 2.9 MG/DL — HIGH (ref 0–0.3)
BILIRUB DIRECT SERPL-MCNC: 2.9 MG/DL — HIGH (ref 0–0.3)
BILIRUB DIRECT SERPL-MCNC: 3 MG/DL — HIGH (ref 0–0.3)
BILIRUB DIRECT SERPL-MCNC: 3.1 MG/DL — HIGH (ref 0–0.3)
BILIRUB DIRECT SERPL-MCNC: 3.2 MG/DL — HIGH (ref 0–0.3)
BILIRUB DIRECT SERPL-MCNC: 3.3 MG/DL — HIGH (ref 0–0.3)
BILIRUB DIRECT SERPL-MCNC: 3.4 MG/DL — HIGH (ref 0–0.3)
BILIRUB DIRECT SERPL-MCNC: 3.5 MG/DL — HIGH (ref 0–0.3)
BILIRUB DIRECT SERPL-MCNC: 3.5 MG/DL — HIGH (ref 0–0.3)
BILIRUB DIRECT SERPL-MCNC: 3.6 MG/DL — HIGH (ref 0–0.3)
BILIRUB DIRECT SERPL-MCNC: 3.7 MG/DL — HIGH (ref 0–0.3)
BILIRUB DIRECT SERPL-MCNC: 3.7 MG/DL — HIGH (ref 0–0.3)
BILIRUB DIRECT SERPL-MCNC: 3.8 MG/DL — HIGH (ref 0–0.3)
BILIRUB DIRECT SERPL-MCNC: 4 MG/DL — HIGH (ref 0–0.3)
BILIRUB DIRECT SERPL-MCNC: 4.1 MG/DL — HIGH (ref 0–0.3)
BILIRUB DIRECT SERPL-MCNC: 4.2 MG/DL — HIGH (ref 0–0.3)
BILIRUB DIRECT SERPL-MCNC: 4.3 MG/DL — HIGH (ref 0–0.3)
BILIRUB DIRECT SERPL-MCNC: 4.3 MG/DL — HIGH (ref 0–0.3)
BILIRUB DIRECT SERPL-MCNC: 4.4 MG/DL — HIGH (ref 0–0.3)
BILIRUB DIRECT SERPL-MCNC: 4.4 MG/DL — HIGH (ref 0–0.3)
BILIRUB DIRECT SERPL-MCNC: 5 MG/DL — HIGH (ref 0–0.3)
BILIRUB DIRECT SERPL-MCNC: 5.1 MG/DL — HIGH (ref 0–0.3)
BILIRUB DIRECT SERPL-MCNC: 5.2 MG/DL — HIGH (ref 0–0.3)
BILIRUB DIRECT SERPL-MCNC: 5.2 MG/DL — HIGH (ref 0–0.3)
BILIRUB DIRECT SERPL-MCNC: 5.4 MG/DL — HIGH (ref 0–0.3)
BILIRUB DIRECT SERPL-MCNC: 5.4 MG/DL — HIGH (ref 0–0.3)
BILIRUB DIRECT SERPL-MCNC: 5.8 MG/DL — HIGH (ref 0–0.3)
BILIRUB DIRECT SERPL-MCNC: 5.9 MG/DL — HIGH (ref 0–0.3)
BILIRUB DIRECT SERPL-MCNC: 6.2 MG/DL — HIGH (ref 0–0.3)
BILIRUB DIRECT SERPL-MCNC: 6.2 MG/DL — HIGH (ref 0–0.3)
BILIRUB DIRECT SERPL-MCNC: 6.5 MG/DL — HIGH (ref 0–0.3)
BILIRUB DIRECT SERPL-MCNC: 6.6 MG/DL — HIGH (ref 0–0.3)
BILIRUB DIRECT SERPL-MCNC: 7.7 MG/DL — HIGH (ref 0–0.3)
BILIRUB DIRECT SERPL-MCNC: 7.8 MG/DL — HIGH (ref 0–0.3)
BILIRUB DIRECT SERPL-MCNC: 7.9 MG/DL — HIGH (ref 0–0.3)
BILIRUB DIRECT SERPL-MCNC: 8 MG/DL — HIGH (ref 0–0.3)
BILIRUB DIRECT SERPL-MCNC: 8.8 MG/DL — HIGH (ref 0–0.3)
BILIRUB DIRECT SERPL-MCNC: 9.2 MG/DL — HIGH (ref 0–0.3)
BILIRUB DIRECT SERPL-MCNC: SIGNIFICANT CHANGE UP (ref 0–0.3)
BILIRUB DIRECT SERPL-MCNC: SIGNIFICANT CHANGE UP MG/DL (ref 0–0.3)
BILIRUB DIRECT SERPL-MCNC: SIGNIFICANT CHANGE UP MG/DL (ref 0–0.3)
BILIRUB INDIRECT FLD-MCNC: 1.3 MG/DL — HIGH (ref 0.2–1)
BILIRUB INDIRECT FLD-MCNC: 1.4 MG/DL — HIGH (ref 0.2–1)
BILIRUB INDIRECT FLD-MCNC: 1.5 MG/DL — HIGH (ref 0.2–1)
BILIRUB INDIRECT FLD-MCNC: 1.6 MG/DL — HIGH (ref 0.2–1)
BILIRUB INDIRECT FLD-MCNC: 1.7 MG/DL — HIGH (ref 0.2–1)
BILIRUB INDIRECT FLD-MCNC: 1.8 MG/DL — HIGH (ref 0.2–1)
BILIRUB INDIRECT FLD-MCNC: 1.9 MG/DL — HIGH (ref 0.2–1)
BILIRUB INDIRECT FLD-MCNC: 2 MG/DL — HIGH (ref 0.2–1)
BILIRUB INDIRECT FLD-MCNC: 2.1 MG/DL — HIGH (ref 0.2–1)
BILIRUB INDIRECT FLD-MCNC: 2.2 MG/DL — HIGH (ref 0.2–1)
BILIRUB INDIRECT FLD-MCNC: 2.3 MG/DL — HIGH (ref 0.2–1)
BILIRUB INDIRECT FLD-MCNC: 2.5 MG/DL — HIGH (ref 0.2–1)
BILIRUB INDIRECT FLD-MCNC: 2.8 MG/DL — HIGH (ref 0.2–1)
BILIRUB INDIRECT FLD-MCNC: 3 MG/DL — HIGH (ref 0.2–1)
BILIRUB INDIRECT FLD-MCNC: 3.2 MG/DL — HIGH (ref 0.2–1)
BILIRUB INDIRECT FLD-MCNC: 3.2 MG/DL — HIGH (ref 0.2–1)
BILIRUB INDIRECT FLD-MCNC: 3.7 MG/DL — HIGH (ref 0.2–1)
BILIRUB INDIRECT FLD-MCNC: 3.7 MG/DL — HIGH (ref 0.2–1)
BILIRUB INDIRECT FLD-MCNC: 3.8 MG/DL — HIGH (ref 0.2–1)
BILIRUB INDIRECT FLD-MCNC: 6.6 MG/DL — HIGH (ref 0.2–1)
BILIRUB INDIRECT FLD-MCNC: 6.8 MG/DL — HIGH (ref 0.2–1)
BILIRUB INDIRECT FLD-MCNC: SIGNIFICANT CHANGE UP (ref 0.2–1)
BILIRUB SERPL-MCNC: 10.2 MG/DL — HIGH (ref 0.2–1.2)
BILIRUB SERPL-MCNC: 10.9 MG/DL — HIGH (ref 0.2–1.2)
BILIRUB SERPL-MCNC: 11.2 MG/DL — HIGH (ref 0.2–1.2)
BILIRUB SERPL-MCNC: 11.4 MG/DL — HIGH (ref 0.2–1.2)
BILIRUB SERPL-MCNC: 11.4 MG/DL — HIGH (ref 0.2–1.2)
BILIRUB SERPL-MCNC: 11.6 MG/DL — HIGH (ref 0.2–1.2)
BILIRUB SERPL-MCNC: 11.6 MG/DL — HIGH (ref 0.2–1.2)
BILIRUB SERPL-MCNC: 12.3 MG/DL — HIGH (ref 0.2–1.2)
BILIRUB SERPL-MCNC: 15.6 MG/DL — HIGH (ref 0.2–1.2)
BILIRUB SERPL-MCNC: 15.8 MG/DL — HIGH (ref 0.2–1.2)
BILIRUB SERPL-MCNC: 3.9 MG/DL — HIGH (ref 0.2–1.2)
BILIRUB SERPL-MCNC: 4 MG/DL — HIGH (ref 0.2–1.2)
BILIRUB SERPL-MCNC: 4.2 MG/DL — HIGH (ref 0.2–1.2)
BILIRUB SERPL-MCNC: 4.3 MG/DL — HIGH (ref 0.2–1.2)
BILIRUB SERPL-MCNC: 4.4 MG/DL — HIGH (ref 0.2–1.2)
BILIRUB SERPL-MCNC: 4.5 MG/DL — HIGH (ref 0.2–1.2)
BILIRUB SERPL-MCNC: 4.5 MG/DL — HIGH (ref 0.2–1.2)
BILIRUB SERPL-MCNC: 4.6 MG/DL — HIGH (ref 0.2–1.2)
BILIRUB SERPL-MCNC: 4.7 MG/DL — HIGH (ref 0.2–1.2)
BILIRUB SERPL-MCNC: 4.8 MG/DL — HIGH (ref 0.2–1.2)
BILIRUB SERPL-MCNC: 4.9 MG/DL — HIGH (ref 0.2–1.2)
BILIRUB SERPL-MCNC: 4.9 MG/DL — HIGH (ref 0.2–1.2)
BILIRUB SERPL-MCNC: 5 MG/DL — HIGH (ref 0.2–1.2)
BILIRUB SERPL-MCNC: 5 MG/DL — HIGH (ref 0.2–1.2)
BILIRUB SERPL-MCNC: 5.1 MG/DL — HIGH (ref 0.2–1.2)
BILIRUB SERPL-MCNC: 5.2 MG/DL — HIGH (ref 0.2–1.2)
BILIRUB SERPL-MCNC: 5.3 MG/DL — HIGH (ref 0.2–1.2)
BILIRUB SERPL-MCNC: 5.4 MG/DL — HIGH (ref 0.2–1.2)
BILIRUB SERPL-MCNC: 5.5 MG/DL — HIGH (ref 0.2–1.2)
BILIRUB SERPL-MCNC: 5.7 MG/DL — HIGH (ref 0.2–1.2)
BILIRUB SERPL-MCNC: 5.7 MG/DL — HIGH (ref 0.2–1.2)
BILIRUB SERPL-MCNC: 5.8 MG/DL — HIGH (ref 0.2–1.2)
BILIRUB SERPL-MCNC: 5.8 MG/DL — HIGH (ref 0.2–1.2)
BILIRUB SERPL-MCNC: 5.9 MG/DL — HIGH (ref 0.2–1.2)
BILIRUB SERPL-MCNC: 6 MG/DL — HIGH (ref 0.2–1.2)
BILIRUB SERPL-MCNC: 6 MG/DL — HIGH (ref 0.2–1.2)
BILIRUB SERPL-MCNC: 6.1 MG/DL — HIGH (ref 0.2–1.2)
BILIRUB SERPL-MCNC: 6.2 MG/DL — HIGH (ref 0.2–1.2)
BILIRUB SERPL-MCNC: 6.2 MG/DL — HIGH (ref 0.2–1.2)
BILIRUB SERPL-MCNC: 6.4 MG/DL — HIGH (ref 0.2–1.2)
BILIRUB SERPL-MCNC: 6.6 MG/DL — HIGH (ref 0.2–1.2)
BILIRUB SERPL-MCNC: 6.7 MG/DL — HIGH (ref 0.2–1.2)
BILIRUB SERPL-MCNC: 6.8 MG/DL — HIGH (ref 0.2–1.2)
BILIRUB SERPL-MCNC: 6.9 MG/DL — HIGH (ref 0.2–1.2)
BILIRUB SERPL-MCNC: 7.2 MG/DL — HIGH (ref 0.2–1.2)
BILIRUB SERPL-MCNC: 7.4 MG/DL — HIGH (ref 0.2–1.2)
BILIRUB SERPL-MCNC: 7.6 MG/DL — HIGH (ref 0.2–1.2)
BILIRUB SERPL-MCNC: 7.9 MG/DL — HIGH (ref 0.2–1.2)
BILIRUB SERPL-MCNC: 8 MG/DL — HIGH (ref 0.2–1.2)
BILIRUB SERPL-MCNC: 8.1 MG/DL — HIGH (ref 0.2–1.2)
BILIRUB SERPL-MCNC: 8.2 MG/DL — HIGH (ref 0.2–1.2)
BILIRUB SERPL-MCNC: 8.2 MG/DL — HIGH (ref 0.2–1.2)
BILIRUB SERPL-MCNC: 8.4 MG/DL — HIGH (ref 0.2–1.2)
BILIRUB SERPL-MCNC: 8.7 MG/DL — HIGH (ref 0.2–1.2)
BILIRUB SERPL-MCNC: 9.1 MG/DL — HIGH (ref 0.2–1.2)
BILIRUB SERPL-MCNC: 9.2 MG/DL — HIGH (ref 0.2–1.2)
BILIRUB SERPL-MCNC: 9.5 MG/DL — HIGH (ref 0.2–1.2)
BILIRUB SERPL-MCNC: 9.7 MG/DL — HIGH (ref 0.2–1.2)
BILIRUB UR-MCNC: ABNORMAL
BLD GP AB SCN SERPL QL: NEGATIVE — SIGNIFICANT CHANGE UP
BUN SERPL-MCNC: 102 MG/DL — HIGH (ref 7–23)
BUN SERPL-MCNC: 109 MG/DL — HIGH (ref 7–23)
BUN SERPL-MCNC: 115 MG/DL — HIGH (ref 7–23)
BUN SERPL-MCNC: 117 MG/DL — HIGH (ref 7–23)
BUN SERPL-MCNC: 132 MG/DL — HIGH (ref 7–23)
BUN SERPL-MCNC: 134 MG/DL — HIGH (ref 7–23)
BUN SERPL-MCNC: 149 MG/DL — HIGH (ref 7–23)
BUN SERPL-MCNC: 28 MG/DL — HIGH (ref 7–23)
BUN SERPL-MCNC: 30 MG/DL — HIGH (ref 7–23)
BUN SERPL-MCNC: 31 MG/DL — HIGH (ref 7–23)
BUN SERPL-MCNC: 31 MG/DL — HIGH (ref 7–23)
BUN SERPL-MCNC: 32 MG/DL — HIGH (ref 7–23)
BUN SERPL-MCNC: 32 MG/DL — HIGH (ref 7–23)
BUN SERPL-MCNC: 34 MG/DL — HIGH (ref 7–23)
BUN SERPL-MCNC: 35 MG/DL — HIGH (ref 7–23)
BUN SERPL-MCNC: 36 MG/DL — HIGH (ref 7–23)
BUN SERPL-MCNC: 37 MG/DL — HIGH (ref 7–23)
BUN SERPL-MCNC: 38 MG/DL — HIGH (ref 7–23)
BUN SERPL-MCNC: 39 MG/DL — HIGH (ref 7–23)
BUN SERPL-MCNC: 40 MG/DL — HIGH (ref 7–23)
BUN SERPL-MCNC: 41 MG/DL — HIGH (ref 7–23)
BUN SERPL-MCNC: 42 MG/DL — HIGH (ref 7–23)
BUN SERPL-MCNC: 43 MG/DL — HIGH (ref 7–23)
BUN SERPL-MCNC: 44 MG/DL — HIGH (ref 7–23)
BUN SERPL-MCNC: 45 MG/DL — HIGH (ref 7–23)
BUN SERPL-MCNC: 46 MG/DL — HIGH (ref 7–23)
BUN SERPL-MCNC: 47 MG/DL — HIGH (ref 7–23)
BUN SERPL-MCNC: 48 MG/DL — HIGH (ref 7–23)
BUN SERPL-MCNC: 48 MG/DL — HIGH (ref 7–23)
BUN SERPL-MCNC: 49 MG/DL — HIGH (ref 7–23)
BUN SERPL-MCNC: 49 MG/DL — HIGH (ref 7–23)
BUN SERPL-MCNC: 51 MG/DL — HIGH (ref 7–23)
BUN SERPL-MCNC: 53 MG/DL — HIGH (ref 7–23)
BUN SERPL-MCNC: 54 MG/DL — HIGH (ref 7–23)
BUN SERPL-MCNC: 55 MG/DL — HIGH (ref 7–23)
BUN SERPL-MCNC: 56 MG/DL — HIGH (ref 7–23)
BUN SERPL-MCNC: 57 MG/DL — HIGH (ref 7–23)
BUN SERPL-MCNC: 58 MG/DL — HIGH (ref 7–23)
BUN SERPL-MCNC: 59 MG/DL — HIGH (ref 7–23)
BUN SERPL-MCNC: 64 MG/DL — HIGH (ref 7–23)
BUN SERPL-MCNC: 64 MG/DL — HIGH (ref 7–23)
BUN SERPL-MCNC: 67 MG/DL — HIGH (ref 7–23)
BUN SERPL-MCNC: 67 MG/DL — HIGH (ref 7–23)
BUN SERPL-MCNC: 69 MG/DL — HIGH (ref 7–23)
BUN SERPL-MCNC: 70 MG/DL — HIGH (ref 7–23)
BUN SERPL-MCNC: 72 MG/DL — HIGH (ref 7–23)
BUN SERPL-MCNC: 74 MG/DL — HIGH (ref 7–23)
BUN SERPL-MCNC: 74 MG/DL — HIGH (ref 7–23)
BUN SERPL-MCNC: 76 MG/DL — HIGH (ref 7–23)
BUN SERPL-MCNC: 80 MG/DL — HIGH (ref 7–23)
BUN SERPL-MCNC: 80 MG/DL — HIGH (ref 7–23)
BUN SERPL-MCNC: 82 MG/DL — HIGH (ref 7–23)
BUN SERPL-MCNC: 85 MG/DL — HIGH (ref 7–23)
BUN SERPL-MCNC: 86 MG/DL — HIGH (ref 7–23)
BUN SERPL-MCNC: 88 MG/DL — HIGH (ref 7–23)
BUN SERPL-MCNC: 88 MG/DL — HIGH (ref 7–23)
BUN SERPL-MCNC: 90 MG/DL — HIGH (ref 7–23)
BUN SERPL-MCNC: 93 MG/DL — HIGH (ref 7–23)
BUN SERPL-MCNC: 94 MG/DL — HIGH (ref 7–23)
BUN SERPL-MCNC: 94 MG/DL — HIGH (ref 7–23)
BURR CELLS BLD QL SMEAR: PRESENT — SIGNIFICANT CHANGE UP
CA-I SERPL-SCNC: 1.14 MMOL/L — LOW (ref 1.15–1.33)
CALCIUM SERPL-MCNC: 6.7 MG/DL — LOW (ref 8.4–10.5)
CALCIUM SERPL-MCNC: 7 MG/DL — LOW (ref 8.4–10.5)
CALCIUM SERPL-MCNC: 7.1 MG/DL — LOW (ref 8.4–10.5)
CALCIUM SERPL-MCNC: 7.2 MG/DL — LOW (ref 8.4–10.5)
CALCIUM SERPL-MCNC: 7.4 MG/DL — LOW (ref 8.4–10.5)
CALCIUM SERPL-MCNC: 7.4 MG/DL — LOW (ref 8.4–10.5)
CALCIUM SERPL-MCNC: 7.5 MG/DL — LOW (ref 8.4–10.5)
CALCIUM SERPL-MCNC: 7.6 MG/DL — LOW (ref 8.4–10.5)
CALCIUM SERPL-MCNC: 7.7 MG/DL — LOW (ref 8.4–10.5)
CALCIUM SERPL-MCNC: 7.8 MG/DL — LOW (ref 8.4–10.5)
CALCIUM SERPL-MCNC: 7.9 MG/DL — LOW (ref 8.4–10.5)
CALCIUM SERPL-MCNC: 8 MG/DL — LOW (ref 8.4–10.5)
CALCIUM SERPL-MCNC: 8.1 MG/DL — LOW (ref 8.4–10.5)
CALCIUM SERPL-MCNC: 8.2 MG/DL — LOW (ref 8.4–10.5)
CALCIUM SERPL-MCNC: 8.3 MG/DL — LOW (ref 8.4–10.5)
CALCIUM SERPL-MCNC: 8.4 MG/DL — SIGNIFICANT CHANGE UP (ref 8.4–10.5)
CALCIUM SERPL-MCNC: 8.5 MG/DL — SIGNIFICANT CHANGE UP (ref 8.4–10.5)
CALCIUM SERPL-MCNC: 8.6 MG/DL — SIGNIFICANT CHANGE UP (ref 8.4–10.5)
CALCIUM SERPL-MCNC: 8.8 MG/DL — SIGNIFICANT CHANGE UP (ref 8.4–10.5)
CALCIUM SERPL-MCNC: 8.8 MG/DL — SIGNIFICANT CHANGE UP (ref 8.4–10.5)
CAST: 0 /LPF — SIGNIFICANT CHANGE UP (ref 0–4)
CAST: 1 /LPF — SIGNIFICANT CHANGE UP (ref 0–4)
CAST: 12 /LPF — HIGH (ref 0–4)
CAST: 17 /LPF — HIGH (ref 0–4)
CAST: 2 /LPF — SIGNIFICANT CHANGE UP (ref 0–4)
CAST: 2 /LPF — SIGNIFICANT CHANGE UP (ref 0–4)
CAST: 3 /LPF — SIGNIFICANT CHANGE UP (ref 0–4)
CAST: 4 /LPF — SIGNIFICANT CHANGE UP (ref 0–4)
CAST: 6 /LPF — HIGH (ref 0–4)
CAST: 8 /LPF — HIGH (ref 0–4)
CHLORIDE SERPL-SCNC: 100 MMOL/L — SIGNIFICANT CHANGE UP (ref 96–108)
CHLORIDE SERPL-SCNC: 101 MMOL/L — SIGNIFICANT CHANGE UP (ref 96–108)
CHLORIDE SERPL-SCNC: 102 MMOL/L — SIGNIFICANT CHANGE UP (ref 96–108)
CHLORIDE SERPL-SCNC: 103 MMOL/L — SIGNIFICANT CHANGE UP (ref 96–108)
CHLORIDE SERPL-SCNC: 104 MMOL/L — SIGNIFICANT CHANGE UP (ref 96–108)
CHLORIDE SERPL-SCNC: 105 MMOL/L — SIGNIFICANT CHANGE UP (ref 96–108)
CHLORIDE SERPL-SCNC: 106 MMOL/L — SIGNIFICANT CHANGE UP (ref 96–108)
CHLORIDE SERPL-SCNC: 106 MMOL/L — SIGNIFICANT CHANGE UP (ref 96–108)
CHLORIDE SERPL-SCNC: 107 MMOL/L — SIGNIFICANT CHANGE UP (ref 96–108)
CHLORIDE SERPL-SCNC: 107 MMOL/L — SIGNIFICANT CHANGE UP (ref 96–108)
CHLORIDE SERPL-SCNC: 108 MMOL/L — SIGNIFICANT CHANGE UP (ref 96–108)
CHLORIDE SERPL-SCNC: 110 MMOL/L — HIGH (ref 96–108)
CHLORIDE SERPL-SCNC: 90 MMOL/L — LOW (ref 96–108)
CHLORIDE SERPL-SCNC: 90 MMOL/L — LOW (ref 96–108)
CHLORIDE SERPL-SCNC: 92 MMOL/L — LOW (ref 96–108)
CHLORIDE SERPL-SCNC: 93 MMOL/L — LOW (ref 96–108)
CHLORIDE SERPL-SCNC: 94 MMOL/L — LOW (ref 96–108)
CHLORIDE SERPL-SCNC: 94 MMOL/L — LOW (ref 96–108)
CHLORIDE SERPL-SCNC: 95 MMOL/L — LOW (ref 96–108)
CHLORIDE SERPL-SCNC: 95 MMOL/L — LOW (ref 96–108)
CHLORIDE SERPL-SCNC: 96 MMOL/L — SIGNIFICANT CHANGE UP (ref 96–108)
CHLORIDE SERPL-SCNC: 96 MMOL/L — SIGNIFICANT CHANGE UP (ref 96–108)
CHLORIDE SERPL-SCNC: 97 MMOL/L — SIGNIFICANT CHANGE UP (ref 96–108)
CHLORIDE SERPL-SCNC: 98 MMOL/L — SIGNIFICANT CHANGE UP (ref 96–108)
CHLORIDE SERPL-SCNC: 99 MMOL/L — SIGNIFICANT CHANGE UP (ref 96–108)
CHOLEST SERPL-MCNC: 61 MG/DL — SIGNIFICANT CHANGE UP
CHOLEST SERPL-MCNC: 74 MG/DL — SIGNIFICANT CHANGE UP
CHOLEST SERPL-MCNC: 85 MG/DL — SIGNIFICANT CHANGE UP
CK MB CFR SERPL CALC: 2.1 NG/ML — SIGNIFICANT CHANGE UP (ref 0–6.7)
CK MB CFR SERPL CALC: 2.3 NG/ML — SIGNIFICANT CHANGE UP (ref 0–6.7)
CK SERPL-CCNC: 16 U/L — LOW (ref 30–200)
CK SERPL-CCNC: 19 U/L — LOW (ref 30–200)
CK SERPL-CCNC: 39 U/L — SIGNIFICANT CHANGE UP (ref 30–200)
CK SERPL-CCNC: 40 U/L — SIGNIFICANT CHANGE UP (ref 30–200)
CO2 BLDA-SCNC: 22 MMOL/L — SIGNIFICANT CHANGE UP (ref 19–24)
CO2 BLDA-SCNC: 38 MMOL/L — HIGH (ref 19–24)
CO2 BLDA-SCNC: 39 MMOL/L — HIGH (ref 19–24)
CO2 BLDV-SCNC: 28.6 MMOL/L — HIGH (ref 22–26)
CO2 SERPL-SCNC: 20 MMOL/L — LOW (ref 22–31)
CO2 SERPL-SCNC: 21 MMOL/L — LOW (ref 22–31)
CO2 SERPL-SCNC: 21 MMOL/L — LOW (ref 22–31)
CO2 SERPL-SCNC: 22 MMOL/L — SIGNIFICANT CHANGE UP (ref 22–31)
CO2 SERPL-SCNC: 23 MMOL/L — SIGNIFICANT CHANGE UP (ref 22–31)
CO2 SERPL-SCNC: 24 MMOL/L — SIGNIFICANT CHANGE UP (ref 22–31)
CO2 SERPL-SCNC: 25 MMOL/L — SIGNIFICANT CHANGE UP (ref 22–31)
CO2 SERPL-SCNC: 26 MMOL/L — SIGNIFICANT CHANGE UP (ref 22–31)
CO2 SERPL-SCNC: 27 MMOL/L — SIGNIFICANT CHANGE UP (ref 22–31)
CO2 SERPL-SCNC: 28 MMOL/L — SIGNIFICANT CHANGE UP (ref 22–31)
CO2 SERPL-SCNC: 29 MMOL/L — SIGNIFICANT CHANGE UP (ref 22–31)
CO2 SERPL-SCNC: 30 MMOL/L — SIGNIFICANT CHANGE UP (ref 22–31)
CO2 SERPL-SCNC: 31 MMOL/L — SIGNIFICANT CHANGE UP (ref 22–31)
CO2 SERPL-SCNC: 32 MMOL/L — HIGH (ref 22–31)
CO2 SERPL-SCNC: 33 MMOL/L — HIGH (ref 22–31)
CO2 SERPL-SCNC: 35 MMOL/L — HIGH (ref 22–31)
CO2 SERPL-SCNC: 36 MMOL/L — HIGH (ref 22–31)
CO2 SERPL-SCNC: 37 MMOL/L — HIGH (ref 22–31)
COARSE GRAN CASTS #/AREA URNS AUTO: PRESENT
COD CRY URNS QL: PRESENT
COLOR FLD: SIGNIFICANT CHANGE UP
COLOR SPEC: SIGNIFICANT CHANGE UP
COMMENT - FLUIDS: SIGNIFICANT CHANGE UP
COPPER SERPL-MCNC: 106 UG/DL — SIGNIFICANT CHANGE UP (ref 69–132)
COPPER SERPL-MCNC: 84 UG/DL — SIGNIFICANT CHANGE UP (ref 69–132)
COPPER SERPL-MCNC: 87 UG/DL — SIGNIFICANT CHANGE UP (ref 69–132)
CREAT ?TM UR-MCNC: 117 MG/DL — SIGNIFICANT CHANGE UP
CREAT ?TM UR-MCNC: 223 MG/DL — SIGNIFICANT CHANGE UP
CREAT ?TM UR-MCNC: 58 MG/DL — SIGNIFICANT CHANGE UP
CREAT ?TM UR-MCNC: 60 MG/DL — SIGNIFICANT CHANGE UP
CREAT ?TM UR-MCNC: 66 MG/DL — SIGNIFICANT CHANGE UP
CREAT ?TM UR-MCNC: 70 MG/DL — SIGNIFICANT CHANGE UP
CREAT ?TM UR-MCNC: 71 MG/DL — SIGNIFICANT CHANGE UP
CREAT ?TM UR-MCNC: 71 MG/DL — SIGNIFICANT CHANGE UP
CREAT ?TM UR-MCNC: 76 MG/DL — SIGNIFICANT CHANGE UP
CREAT ?TM UR-MCNC: 78 MG/DL — SIGNIFICANT CHANGE UP
CREAT SERPL-MCNC: 1.13 MG/DL — SIGNIFICANT CHANGE UP (ref 0.5–1.3)
CREAT SERPL-MCNC: 1.14 MG/DL — SIGNIFICANT CHANGE UP (ref 0.5–1.3)
CREAT SERPL-MCNC: 1.16 MG/DL — SIGNIFICANT CHANGE UP (ref 0.5–1.3)
CREAT SERPL-MCNC: 1.16 MG/DL — SIGNIFICANT CHANGE UP (ref 0.5–1.3)
CREAT SERPL-MCNC: 1.17 MG/DL — SIGNIFICANT CHANGE UP (ref 0.5–1.3)
CREAT SERPL-MCNC: 1.18 MG/DL — SIGNIFICANT CHANGE UP (ref 0.5–1.3)
CREAT SERPL-MCNC: 1.2 MG/DL — SIGNIFICANT CHANGE UP (ref 0.5–1.3)
CREAT SERPL-MCNC: 1.22 MG/DL — SIGNIFICANT CHANGE UP (ref 0.5–1.3)
CREAT SERPL-MCNC: 1.24 MG/DL — SIGNIFICANT CHANGE UP (ref 0.5–1.3)
CREAT SERPL-MCNC: 1.25 MG/DL — SIGNIFICANT CHANGE UP (ref 0.5–1.3)
CREAT SERPL-MCNC: 1.26 MG/DL — SIGNIFICANT CHANGE UP (ref 0.5–1.3)
CREAT SERPL-MCNC: 1.27 MG/DL — SIGNIFICANT CHANGE UP (ref 0.5–1.3)
CREAT SERPL-MCNC: 1.27 MG/DL — SIGNIFICANT CHANGE UP (ref 0.5–1.3)
CREAT SERPL-MCNC: 1.29 MG/DL — SIGNIFICANT CHANGE UP (ref 0.5–1.3)
CREAT SERPL-MCNC: 1.3 MG/DL — SIGNIFICANT CHANGE UP (ref 0.5–1.3)
CREAT SERPL-MCNC: 1.31 MG/DL — HIGH (ref 0.5–1.3)
CREAT SERPL-MCNC: 1.32 MG/DL — HIGH (ref 0.5–1.3)
CREAT SERPL-MCNC: 1.33 MG/DL — HIGH (ref 0.5–1.3)
CREAT SERPL-MCNC: 1.34 MG/DL — HIGH (ref 0.5–1.3)
CREAT SERPL-MCNC: 1.35 MG/DL — HIGH (ref 0.5–1.3)
CREAT SERPL-MCNC: 1.36 MG/DL — HIGH (ref 0.5–1.3)
CREAT SERPL-MCNC: 1.36 MG/DL — HIGH (ref 0.5–1.3)
CREAT SERPL-MCNC: 1.37 MG/DL — HIGH (ref 0.5–1.3)
CREAT SERPL-MCNC: 1.37 MG/DL — HIGH (ref 0.5–1.3)
CREAT SERPL-MCNC: 1.38 MG/DL — HIGH (ref 0.5–1.3)
CREAT SERPL-MCNC: 1.4 MG/DL — HIGH (ref 0.5–1.3)
CREAT SERPL-MCNC: 1.41 MG/DL — HIGH (ref 0.5–1.3)
CREAT SERPL-MCNC: 1.42 MG/DL — HIGH (ref 0.5–1.3)
CREAT SERPL-MCNC: 1.43 MG/DL — HIGH (ref 0.5–1.3)
CREAT SERPL-MCNC: 1.43 MG/DL — HIGH (ref 0.5–1.3)
CREAT SERPL-MCNC: 1.44 MG/DL — HIGH (ref 0.5–1.3)
CREAT SERPL-MCNC: 1.44 MG/DL — HIGH (ref 0.5–1.3)
CREAT SERPL-MCNC: 1.45 MG/DL — HIGH (ref 0.5–1.3)
CREAT SERPL-MCNC: 1.46 MG/DL — HIGH (ref 0.5–1.3)
CREAT SERPL-MCNC: 1.47 MG/DL — HIGH (ref 0.5–1.3)
CREAT SERPL-MCNC: 1.48 MG/DL — HIGH (ref 0.5–1.3)
CREAT SERPL-MCNC: 1.49 MG/DL — HIGH (ref 0.5–1.3)
CREAT SERPL-MCNC: 1.5 MG/DL — HIGH (ref 0.5–1.3)
CREAT SERPL-MCNC: 1.55 MG/DL — HIGH (ref 0.5–1.3)
CREAT SERPL-MCNC: 1.56 MG/DL — HIGH (ref 0.5–1.3)
CREAT SERPL-MCNC: 1.57 MG/DL — HIGH (ref 0.5–1.3)
CREAT SERPL-MCNC: 1.6 MG/DL — HIGH (ref 0.5–1.3)
CREAT SERPL-MCNC: 1.61 MG/DL — HIGH (ref 0.5–1.3)
CREAT SERPL-MCNC: 1.61 MG/DL — HIGH (ref 0.5–1.3)
CREAT SERPL-MCNC: 1.62 MG/DL — HIGH (ref 0.5–1.3)
CREAT SERPL-MCNC: 1.63 MG/DL — HIGH (ref 0.5–1.3)
CREAT SERPL-MCNC: 1.65 MG/DL — HIGH (ref 0.5–1.3)
CREAT SERPL-MCNC: 1.67 MG/DL — HIGH (ref 0.5–1.3)
CREAT SERPL-MCNC: 1.67 MG/DL — HIGH (ref 0.5–1.3)
CREAT SERPL-MCNC: 1.68 MG/DL — HIGH (ref 0.5–1.3)
CREAT SERPL-MCNC: 1.72 MG/DL — HIGH (ref 0.5–1.3)
CREAT SERPL-MCNC: 1.73 MG/DL — HIGH (ref 0.5–1.3)
CREAT SERPL-MCNC: 1.74 MG/DL — HIGH (ref 0.5–1.3)
CREAT SERPL-MCNC: 1.77 MG/DL — HIGH (ref 0.5–1.3)
CREAT SERPL-MCNC: 1.79 MG/DL — HIGH (ref 0.5–1.3)
CREAT SERPL-MCNC: 1.82 MG/DL — HIGH (ref 0.5–1.3)
CREAT SERPL-MCNC: 1.88 MG/DL — HIGH (ref 0.5–1.3)
CREAT SERPL-MCNC: 1.9 MG/DL — HIGH (ref 0.5–1.3)
CREAT SERPL-MCNC: 1.92 MG/DL — HIGH (ref 0.5–1.3)
CREAT SERPL-MCNC: 1.93 MG/DL — HIGH (ref 0.5–1.3)
CREAT SERPL-MCNC: 1.96 MG/DL — HIGH (ref 0.5–1.3)
CREAT SERPL-MCNC: 1.98 MG/DL — HIGH (ref 0.5–1.3)
CREAT SERPL-MCNC: 2.07 MG/DL — HIGH (ref 0.5–1.3)
CREAT SERPL-MCNC: 2.14 MG/DL — HIGH (ref 0.5–1.3)
CREAT SERPL-MCNC: 2.15 MG/DL — HIGH (ref 0.5–1.3)
CREAT SERPL-MCNC: 2.17 MG/DL — HIGH (ref 0.5–1.3)
CREAT SERPL-MCNC: 2.22 MG/DL — HIGH (ref 0.5–1.3)
CREAT SERPL-MCNC: 2.23 MG/DL — HIGH (ref 0.5–1.3)
CREAT SERPL-MCNC: 2.24 MG/DL — HIGH (ref 0.5–1.3)
CREAT SERPL-MCNC: 2.29 MG/DL — HIGH (ref 0.5–1.3)
CREAT SERPL-MCNC: 2.34 MG/DL — HIGH (ref 0.5–1.3)
CREAT SERPL-MCNC: 2.4 MG/DL — HIGH (ref 0.5–1.3)
CREAT SERPL-MCNC: 2.7 MG/DL — HIGH (ref 0.5–1.3)
CREAT SERPL-MCNC: 3.1 MG/DL — HIGH (ref 0.5–1.3)
CREAT SERPL-MCNC: 3.31 MG/DL — HIGH (ref 0.5–1.3)
CREAT SERPL-MCNC: 3.43 MG/DL — HIGH (ref 0.5–1.3)
CREAT SERPL-MCNC: 3.5 MG/DL — HIGH (ref 0.5–1.3)
CREAT SERPL-MCNC: SIGNIFICANT CHANGE UP MG/DL (ref 0.5–1.3)
CREAT SERPL-MCNC: SIGNIFICANT CHANGE UP MG/DL (ref 0.5–1.3)
CULTURE RESULTS: ABNORMAL
CULTURE RESULTS: NO GROWTH — SIGNIFICANT CHANGE UP
CULTURE RESULTS: SIGNIFICANT CHANGE UP
CYSTATIN C SERPL-MCNC: 2.65 MG/L — HIGH (ref 0.82–1.52)
CYSTATIN C SERPL-MCNC: 3.25 MG/L — HIGH (ref 0.82–1.52)
CYSTATIN C SERPL-MCNC: 4.35 MG/L — HIGH (ref 0.82–1.52)
CYSTATIN C SERPL-MCNC: 4.8 MG/L — HIGH (ref 0.82–1.52)
CYSTATIN C SERPL-MCNC: 5.01 MG/L — HIGH (ref 0.82–1.52)
D DIMER BLD IA.RAPID-MCNC: 725 NG/ML DDU — HIGH
D DIMER BLD IA.RAPID-MCNC: 775 NG/ML DDU — HIGH
D DIMER BLD IA.RAPID-MCNC: 801 NG/ML DDU — HIGH
D DIMER BLD IA.RAPID-MCNC: 925 NG/ML DDU — HIGH
DACRYOCYTES BLD QL SMEAR: SLIGHT — SIGNIFICANT CHANGE UP
DAT POLY-SP REAG RBC QL: NEGATIVE — SIGNIFICANT CHANGE UP
DIFF PNL FLD: ABNORMAL
DIFF PNL FLD: NEGATIVE — SIGNIFICANT CHANGE UP
EGFR: 17 ML/MIN/1.73M2 — LOW
EGFR: 18 ML/MIN/1.73M2 — LOW
EGFR: 18 ML/MIN/1.73M2 — LOW
EGFR: 20 ML/MIN/1.73M2 — LOW
EGFR: 24 ML/MIN/1.73M2 — LOW
EGFR: 27 ML/MIN/1.73M2 — LOW
EGFR: 28 ML/MIN/1.73M2 — LOW
EGFR: 29 ML/MIN/1.73M2 — LOW
EGFR: 29 ML/MIN/1.73M2 — LOW
EGFR: 30 ML/MIN/1.73M2 — LOW
EGFR: 30 ML/MIN/1.73M2 — LOW
EGFR: 31 ML/MIN/1.73M2 — LOW
EGFR: 32 ML/MIN/1.73M2 — LOW
EGFR: 34 ML/MIN/1.73M2 — LOW
EGFR: 35 ML/MIN/1.73M2 — LOW
EGFR: 36 ML/MIN/1.73M2 — LOW
EGFR: 36 ML/MIN/1.73M2 — LOW
EGFR: 38 ML/MIN/1.73M2 — LOW
EGFR: 39 ML/MIN/1.73M2 — LOW
EGFR: 39 ML/MIN/1.73M2 — LOW
EGFR: 40 ML/MIN/1.73M2 — LOW
EGFR: 42 ML/MIN/1.73M2 — LOW
EGFR: 43 ML/MIN/1.73M2 — LOW
EGFR: 43 ML/MIN/1.73M2 — LOW
EGFR: 44 ML/MIN/1.73M2 — LOW
EGFR: 45 ML/MIN/1.73M2 — LOW
EGFR: 46 ML/MIN/1.73M2 — LOW
EGFR: 48 ML/MIN/1.73M2 — LOW
EGFR: 49 ML/MIN/1.73M2 — LOW
EGFR: 50 ML/MIN/1.73M2 — LOW
EGFR: 51 ML/MIN/1.73M2 — LOW
EGFR: 52 ML/MIN/1.73M2 — LOW
EGFR: 53 ML/MIN/1.73M2 — LOW
EGFR: 54 ML/MIN/1.73M2 — LOW
EGFR: 55 ML/MIN/1.73M2 — LOW
EGFR: 56 ML/MIN/1.73M2 — LOW
EGFR: 57 ML/MIN/1.73M2 — LOW
EGFR: 58 ML/MIN/1.73M2 — LOW
EGFR: 58 ML/MIN/1.73M2 — LOW
EGFR: 59 ML/MIN/1.73M2 — LOW
EGFR: 59 ML/MIN/1.73M2 — LOW
EGFR: 60 ML/MIN/1.73M2 — SIGNIFICANT CHANGE UP
EGFR: 61 ML/MIN/1.73M2 — SIGNIFICANT CHANGE UP
EGFR: 62 ML/MIN/1.73M2 — SIGNIFICANT CHANGE UP
EGFR: 64 ML/MIN/1.73M2 — SIGNIFICANT CHANGE UP
EGFR: 65 ML/MIN/1.73M2 — SIGNIFICANT CHANGE UP
EGFR: 65 ML/MIN/1.73M2 — SIGNIFICANT CHANGE UP
EGFR: 66 ML/MIN/1.73M2 — SIGNIFICANT CHANGE UP
EGFR: 67 ML/MIN/1.73M2 — SIGNIFICANT CHANGE UP
ELLIPTOCYTES BLD QL SMEAR: SLIGHT — SIGNIFICANT CHANGE UP
EOSINOPHIL # BLD AUTO: 0 K/UL — SIGNIFICANT CHANGE UP (ref 0–0.5)
EOSINOPHIL # BLD AUTO: 0.02 K/UL — SIGNIFICANT CHANGE UP (ref 0–0.5)
EOSINOPHIL # BLD AUTO: 0.03 K/UL — SIGNIFICANT CHANGE UP (ref 0–0.5)
EOSINOPHIL # BLD AUTO: 0.04 K/UL — SIGNIFICANT CHANGE UP (ref 0–0.5)
EOSINOPHIL # BLD AUTO: 0.05 K/UL — SIGNIFICANT CHANGE UP (ref 0–0.5)
EOSINOPHIL # BLD AUTO: 0.06 K/UL — SIGNIFICANT CHANGE UP (ref 0–0.5)
EOSINOPHIL # BLD AUTO: 0.08 K/UL — SIGNIFICANT CHANGE UP (ref 0–0.5)
EOSINOPHIL # BLD AUTO: 0.1 K/UL — SIGNIFICANT CHANGE UP (ref 0–0.5)
EOSINOPHIL # BLD AUTO: 0.12 K/UL — SIGNIFICANT CHANGE UP (ref 0–0.5)
EOSINOPHIL # BLD AUTO: 0.13 K/UL — SIGNIFICANT CHANGE UP (ref 0–0.5)
EOSINOPHIL # BLD AUTO: 0.14 K/UL — SIGNIFICANT CHANGE UP (ref 0–0.5)
EOSINOPHIL # BLD AUTO: 0.14 K/UL — SIGNIFICANT CHANGE UP (ref 0–0.5)
EOSINOPHIL # BLD AUTO: 0.17 K/UL — SIGNIFICANT CHANGE UP (ref 0–0.5)
EOSINOPHIL # BLD AUTO: 0.17 K/UL — SIGNIFICANT CHANGE UP (ref 0–0.5)
EOSINOPHIL # BLD AUTO: 0.18 K/UL — SIGNIFICANT CHANGE UP (ref 0–0.5)
EOSINOPHIL # BLD AUTO: 0.21 K/UL — SIGNIFICANT CHANGE UP (ref 0–0.5)
EOSINOPHIL # BLD AUTO: 0.22 K/UL — SIGNIFICANT CHANGE UP (ref 0–0.5)
EOSINOPHIL # BLD AUTO: 0.24 K/UL — SIGNIFICANT CHANGE UP (ref 0–0.5)
EOSINOPHIL # BLD AUTO: 0.25 K/UL — SIGNIFICANT CHANGE UP (ref 0–0.5)
EOSINOPHIL # BLD AUTO: 0.26 K/UL — SIGNIFICANT CHANGE UP (ref 0–0.5)
EOSINOPHIL # BLD AUTO: 0.26 K/UL — SIGNIFICANT CHANGE UP (ref 0–0.5)
EOSINOPHIL # BLD AUTO: 0.28 K/UL — SIGNIFICANT CHANGE UP (ref 0–0.5)
EOSINOPHIL # BLD AUTO: 0.3 K/UL — SIGNIFICANT CHANGE UP (ref 0–0.5)
EOSINOPHIL # BLD AUTO: 0.3 K/UL — SIGNIFICANT CHANGE UP (ref 0–0.5)
EOSINOPHIL # BLD AUTO: 0.34 K/UL — SIGNIFICANT CHANGE UP (ref 0–0.5)
EOSINOPHIL # BLD AUTO: 0.34 K/UL — SIGNIFICANT CHANGE UP (ref 0–0.5)
EOSINOPHIL # BLD AUTO: 0.53 K/UL — HIGH (ref 0–0.5)
EOSINOPHIL # BLD AUTO: 0.54 K/UL — HIGH (ref 0–0.5)
EOSINOPHIL # BLD AUTO: 0.54 K/UL — HIGH (ref 0–0.5)
EOSINOPHIL NFR BLD AUTO: 0 % — SIGNIFICANT CHANGE UP (ref 0–6)
EOSINOPHIL NFR BLD AUTO: 0.1 % — SIGNIFICANT CHANGE UP (ref 0–6)
EOSINOPHIL NFR BLD AUTO: 0.2 % — SIGNIFICANT CHANGE UP (ref 0–6)
EOSINOPHIL NFR BLD AUTO: 0.3 % — SIGNIFICANT CHANGE UP (ref 0–6)
EOSINOPHIL NFR BLD AUTO: 0.4 % — SIGNIFICANT CHANGE UP (ref 0–6)
EOSINOPHIL NFR BLD AUTO: 0.6 % — SIGNIFICANT CHANGE UP (ref 0–6)
EOSINOPHIL NFR BLD AUTO: 0.6 % — SIGNIFICANT CHANGE UP (ref 0–6)
EOSINOPHIL NFR BLD AUTO: 0.8 % — SIGNIFICANT CHANGE UP (ref 0–6)
EOSINOPHIL NFR BLD AUTO: 0.9 % — SIGNIFICANT CHANGE UP (ref 0–6)
EOSINOPHIL NFR BLD AUTO: 1 % — SIGNIFICANT CHANGE UP (ref 0–6)
EOSINOPHIL NFR BLD AUTO: 1.1 % — SIGNIFICANT CHANGE UP (ref 0–6)
EOSINOPHIL NFR BLD AUTO: 1.3 % — SIGNIFICANT CHANGE UP (ref 0–6)
EOSINOPHIL NFR BLD AUTO: 1.5 % — SIGNIFICANT CHANGE UP (ref 0–6)
EOSINOPHIL NFR BLD AUTO: 1.5 % — SIGNIFICANT CHANGE UP (ref 0–6)
EOSINOPHIL NFR BLD AUTO: 1.6 % — SIGNIFICANT CHANGE UP (ref 0–6)
EOSINOPHIL NFR BLD AUTO: 1.8 % — SIGNIFICANT CHANGE UP (ref 0–6)
EOSINOPHIL NFR BLD AUTO: 1.8 % — SIGNIFICANT CHANGE UP (ref 0–6)
EOSINOPHIL NFR BLD AUTO: 1.9 % — SIGNIFICANT CHANGE UP (ref 0–6)
EOSINOPHIL NFR BLD AUTO: 2.2 % — SIGNIFICANT CHANGE UP (ref 0–6)
EOSINOPHIL NFR BLD AUTO: 2.2 % — SIGNIFICANT CHANGE UP (ref 0–6)
EOSINOPHIL NFR BLD AUTO: 2.5 % — SIGNIFICANT CHANGE UP (ref 0–6)
EOSINOPHIL NFR BLD AUTO: 2.6 % — SIGNIFICANT CHANGE UP (ref 0–6)
EOSINOPHIL NFR BLD AUTO: 2.7 % — SIGNIFICANT CHANGE UP (ref 0–6)
EOSINOPHIL NFR BLD AUTO: 2.8 % — SIGNIFICANT CHANGE UP (ref 0–6)
EOSINOPHIL NFR BLD AUTO: 2.8 % — SIGNIFICANT CHANGE UP (ref 0–6)
EOSINOPHIL NFR BLD AUTO: 3.2 % — SIGNIFICANT CHANGE UP (ref 0–6)
EOSINOPHIL NFR BLD AUTO: 3.2 % — SIGNIFICANT CHANGE UP (ref 0–6)
EOSINOPHIL NFR BLD AUTO: 4.1 % — SIGNIFICANT CHANGE UP (ref 0–6)
EOSINOPHIL NFR BLD AUTO: 4.3 % — SIGNIFICANT CHANGE UP (ref 0–6)
EOSINOPHIL NFR BLD AUTO: 6.6 % — HIGH (ref 0–6)
EOSINOPHIL NFR BLD AUTO: 6.6 % — HIGH (ref 0–6)
ESTIMATED AVERAGE GLUCOSE: 105 MG/DL — SIGNIFICANT CHANGE UP (ref 68–114)
ESTIMATED AVERAGE GLUCOSE: <74 MG/DL — SIGNIFICANT CHANGE UP (ref 68–114)
FACT IX PPP CHRO-ACNC: 65 % — LOW (ref 69–167)
FACT IX PPP CHRO-ACNC: 66 % — LOW (ref 69–167)
FACT VII ACT/NOR PPP: 30 % — LOW (ref 53–149)
FACT VIII ACT/NOR PPP: 277 % — HIGH (ref 51–138)
FACT VIII ACT/NOR PPP: 326 % — HIGH (ref 51–138)
FACT XII ACT/NOR PPP: 43 % — LOW (ref 73–148)
FACT XII ACT/NOR PPP: 51 % — LOW (ref 73–148)
FACT XIII ACT/NOR PPP CHRO: 70 % — SIGNIFICANT CHANGE UP (ref 51–163)
FERRITIN SERPL-MCNC: 127 NG/ML — SIGNIFICANT CHANGE UP (ref 30–400)
FERRITIN SERPL-MCNC: 146 NG/ML — SIGNIFICANT CHANGE UP (ref 30–400)
FIBRINOGEN AG PPP IA-MCNC: 162 MG/DL — LOW (ref 233–496)
FIBRINOGEN AG PPP IA-MCNC: 354 MG/DL — SIGNIFICANT CHANGE UP (ref 233–496)
FIBRINOGEN PPP-MCNC: 122 MG/DL — LOW (ref 200–445)
FIBRINOGEN PPP-MCNC: 132 MG/DL — LOW (ref 200–445)
FIBRINOGEN PPP-MCNC: 154 MG/DL — LOW (ref 200–445)
FIBRINOGEN PPP-MCNC: 155 MG/DL — LOW (ref 200–445)
FIBRINOGEN PPP-MCNC: 160 MG/DL — LOW (ref 200–445)
FIBRINOGEN PPP-MCNC: 185 MG/DL — LOW (ref 200–445)
FIBRINOGEN PPP-MCNC: 186 MG/DL — LOW (ref 200–445)
FIBRINOGEN PPP-MCNC: 190 MG/DL — LOW (ref 200–445)
FIBRINOGEN PPP-MCNC: 205 MG/DL — SIGNIFICANT CHANGE UP (ref 200–445)
FIBRINOGEN PPP-MCNC: 209 MG/DL — SIGNIFICANT CHANGE UP (ref 200–445)
FIBRINOGEN PPP-MCNC: 214 MG/DL — SIGNIFICANT CHANGE UP (ref 200–445)
FIBRINOGEN PPP-MCNC: 219 MG/DL — SIGNIFICANT CHANGE UP (ref 200–445)
FIBRINOGEN PPP-MCNC: 280 MG/DL — SIGNIFICANT CHANGE UP (ref 200–445)
FIBRINOGEN PPP-MCNC: 83 MG/DL — CRITICAL LOW (ref 200–445)
FINE GRAN CASTS #/AREA URNS AUTO: PRESENT
FINE GRAN CASTS #/AREA URNS AUTO: PRESENT
FLUID INTAKE SUBSTANCE CLASS: SIGNIFICANT CHANGE UP
FOLATE SERPL-MCNC: >20 NG/ML — SIGNIFICANT CHANGE UP
FOLATE SERPL-MCNC: >20 NG/ML — SIGNIFICANT CHANGE UP
FUNGITELL: 141 PG/ML — HIGH
FUNGITELL: 97 PG/ML — HIGH
GAMMA GLOBULIN: 3.9 G/DL — HIGH (ref 0.6–1.6)
GAMMA INTERFERON BACKGROUND BLD IA-ACNC: 0.02 IU/ML — SIGNIFICANT CHANGE UP
GAS PNL BLDA: SIGNIFICANT CHANGE UP
GAS PNL BLDA: SIGNIFICANT CHANGE UP
GAS PNL BLDV: 136 MMOL/L — SIGNIFICANT CHANGE UP (ref 136–145)
GAS PNL BLDV: SIGNIFICANT CHANGE UP
GFR/BSA.PRED SERPLBLD CYS-BASED-ARV: 10 ML/MIN/1.73M2 — LOW
GFR/BSA.PRED SERPLBLD CYS-BASED-ARV: 15 ML/MIN/1.73M2 — LOW
GFR/BSA.PRED SERPLBLD CYS-BASED-ARV: 20 ML/MIN/1.73M2 — LOW
GFR/BSA.PRED SERPLBLD CYS-BASED-ARV: 9 ML/MIN/1.73M2 — LOW
GFR/BSA.PRED SERPLBLD CYS-BASED-ARV: 9 ML/MIN/1.73M2 — LOW
GI PCR PANEL: SIGNIFICANT CHANGE UP
GIANT PLATELETS BLD QL SMEAR: PRESENT — SIGNIFICANT CHANGE UP
GLUCOSE BLDC GLUCOMTR-MCNC: 100 MG/DL — HIGH (ref 70–99)
GLUCOSE BLDC GLUCOMTR-MCNC: 100 MG/DL — HIGH (ref 70–99)
GLUCOSE BLDC GLUCOMTR-MCNC: 101 MG/DL — HIGH (ref 70–99)
GLUCOSE BLDC GLUCOMTR-MCNC: 102 MG/DL — HIGH (ref 70–99)
GLUCOSE BLDC GLUCOMTR-MCNC: 104 MG/DL — HIGH (ref 70–99)
GLUCOSE BLDC GLUCOMTR-MCNC: 106 MG/DL — HIGH (ref 70–99)
GLUCOSE BLDC GLUCOMTR-MCNC: 106 MG/DL — HIGH (ref 70–99)
GLUCOSE BLDC GLUCOMTR-MCNC: 108 MG/DL — HIGH (ref 70–99)
GLUCOSE BLDC GLUCOMTR-MCNC: 109 MG/DL — HIGH (ref 70–99)
GLUCOSE BLDC GLUCOMTR-MCNC: 110 MG/DL — HIGH (ref 70–99)
GLUCOSE BLDC GLUCOMTR-MCNC: 111 MG/DL — HIGH (ref 70–99)
GLUCOSE BLDC GLUCOMTR-MCNC: 112 MG/DL — HIGH (ref 70–99)
GLUCOSE BLDC GLUCOMTR-MCNC: 113 MG/DL — HIGH (ref 70–99)
GLUCOSE BLDC GLUCOMTR-MCNC: 116 MG/DL — HIGH (ref 70–99)
GLUCOSE BLDC GLUCOMTR-MCNC: 118 MG/DL — HIGH (ref 70–99)
GLUCOSE BLDC GLUCOMTR-MCNC: 118 MG/DL — HIGH (ref 70–99)
GLUCOSE BLDC GLUCOMTR-MCNC: 119 MG/DL — HIGH (ref 70–99)
GLUCOSE BLDC GLUCOMTR-MCNC: 121 MG/DL — HIGH (ref 70–99)
GLUCOSE BLDC GLUCOMTR-MCNC: 122 MG/DL — HIGH (ref 70–99)
GLUCOSE BLDC GLUCOMTR-MCNC: 122 MG/DL — HIGH (ref 70–99)
GLUCOSE BLDC GLUCOMTR-MCNC: 123 MG/DL — HIGH (ref 70–99)
GLUCOSE BLDC GLUCOMTR-MCNC: 125 MG/DL — HIGH (ref 70–99)
GLUCOSE BLDC GLUCOMTR-MCNC: 126 MG/DL — HIGH (ref 70–99)
GLUCOSE BLDC GLUCOMTR-MCNC: 126 MG/DL — HIGH (ref 70–99)
GLUCOSE BLDC GLUCOMTR-MCNC: 127 MG/DL — HIGH (ref 70–99)
GLUCOSE BLDC GLUCOMTR-MCNC: 128 MG/DL — HIGH (ref 70–99)
GLUCOSE BLDC GLUCOMTR-MCNC: 128 MG/DL — HIGH (ref 70–99)
GLUCOSE BLDC GLUCOMTR-MCNC: 129 MG/DL — HIGH (ref 70–99)
GLUCOSE BLDC GLUCOMTR-MCNC: 129 MG/DL — HIGH (ref 70–99)
GLUCOSE BLDC GLUCOMTR-MCNC: 130 MG/DL — HIGH (ref 70–99)
GLUCOSE BLDC GLUCOMTR-MCNC: 130 MG/DL — HIGH (ref 70–99)
GLUCOSE BLDC GLUCOMTR-MCNC: 131 MG/DL — HIGH (ref 70–99)
GLUCOSE BLDC GLUCOMTR-MCNC: 132 MG/DL — HIGH (ref 70–99)
GLUCOSE BLDC GLUCOMTR-MCNC: 132 MG/DL — HIGH (ref 70–99)
GLUCOSE BLDC GLUCOMTR-MCNC: 133 MG/DL — HIGH (ref 70–99)
GLUCOSE BLDC GLUCOMTR-MCNC: 135 MG/DL — HIGH (ref 70–99)
GLUCOSE BLDC GLUCOMTR-MCNC: 136 MG/DL — HIGH (ref 70–99)
GLUCOSE BLDC GLUCOMTR-MCNC: 137 MG/DL — HIGH (ref 70–99)
GLUCOSE BLDC GLUCOMTR-MCNC: 138 MG/DL — HIGH (ref 70–99)
GLUCOSE BLDC GLUCOMTR-MCNC: 138 MG/DL — HIGH (ref 70–99)
GLUCOSE BLDC GLUCOMTR-MCNC: 141 MG/DL — HIGH (ref 70–99)
GLUCOSE BLDC GLUCOMTR-MCNC: 141 MG/DL — HIGH (ref 70–99)
GLUCOSE BLDC GLUCOMTR-MCNC: 142 MG/DL — HIGH (ref 70–99)
GLUCOSE BLDC GLUCOMTR-MCNC: 142 MG/DL — HIGH (ref 70–99)
GLUCOSE BLDC GLUCOMTR-MCNC: 143 MG/DL — HIGH (ref 70–99)
GLUCOSE BLDC GLUCOMTR-MCNC: 144 MG/DL — HIGH (ref 70–99)
GLUCOSE BLDC GLUCOMTR-MCNC: 144 MG/DL — HIGH (ref 70–99)
GLUCOSE BLDC GLUCOMTR-MCNC: 145 MG/DL — HIGH (ref 70–99)
GLUCOSE BLDC GLUCOMTR-MCNC: 147 MG/DL — HIGH (ref 70–99)
GLUCOSE BLDC GLUCOMTR-MCNC: 147 MG/DL — HIGH (ref 70–99)
GLUCOSE BLDC GLUCOMTR-MCNC: 148 MG/DL — HIGH (ref 70–99)
GLUCOSE BLDC GLUCOMTR-MCNC: 148 MG/DL — HIGH (ref 70–99)
GLUCOSE BLDC GLUCOMTR-MCNC: 150 MG/DL — HIGH (ref 70–99)
GLUCOSE BLDC GLUCOMTR-MCNC: 151 MG/DL — HIGH (ref 70–99)
GLUCOSE BLDC GLUCOMTR-MCNC: 152 MG/DL — HIGH (ref 70–99)
GLUCOSE BLDC GLUCOMTR-MCNC: 152 MG/DL — HIGH (ref 70–99)
GLUCOSE BLDC GLUCOMTR-MCNC: 153 MG/DL — HIGH (ref 70–99)
GLUCOSE BLDC GLUCOMTR-MCNC: 154 MG/DL — HIGH (ref 70–99)
GLUCOSE BLDC GLUCOMTR-MCNC: 155 MG/DL — HIGH (ref 70–99)
GLUCOSE BLDC GLUCOMTR-MCNC: 155 MG/DL — HIGH (ref 70–99)
GLUCOSE BLDC GLUCOMTR-MCNC: 156 MG/DL — HIGH (ref 70–99)
GLUCOSE BLDC GLUCOMTR-MCNC: 157 MG/DL — HIGH (ref 70–99)
GLUCOSE BLDC GLUCOMTR-MCNC: 157 MG/DL — HIGH (ref 70–99)
GLUCOSE BLDC GLUCOMTR-MCNC: 158 MG/DL — HIGH (ref 70–99)
GLUCOSE BLDC GLUCOMTR-MCNC: 158 MG/DL — HIGH (ref 70–99)
GLUCOSE BLDC GLUCOMTR-MCNC: 159 MG/DL — HIGH (ref 70–99)
GLUCOSE BLDC GLUCOMTR-MCNC: 160 MG/DL — HIGH (ref 70–99)
GLUCOSE BLDC GLUCOMTR-MCNC: 161 MG/DL — HIGH (ref 70–99)
GLUCOSE BLDC GLUCOMTR-MCNC: 162 MG/DL — HIGH (ref 70–99)
GLUCOSE BLDC GLUCOMTR-MCNC: 162 MG/DL — HIGH (ref 70–99)
GLUCOSE BLDC GLUCOMTR-MCNC: 163 MG/DL — HIGH (ref 70–99)
GLUCOSE BLDC GLUCOMTR-MCNC: 165 MG/DL — HIGH (ref 70–99)
GLUCOSE BLDC GLUCOMTR-MCNC: 165 MG/DL — HIGH (ref 70–99)
GLUCOSE BLDC GLUCOMTR-MCNC: 166 MG/DL — HIGH (ref 70–99)
GLUCOSE BLDC GLUCOMTR-MCNC: 167 MG/DL — HIGH (ref 70–99)
GLUCOSE BLDC GLUCOMTR-MCNC: 170 MG/DL — HIGH (ref 70–99)
GLUCOSE BLDC GLUCOMTR-MCNC: 172 MG/DL — HIGH (ref 70–99)
GLUCOSE BLDC GLUCOMTR-MCNC: 173 MG/DL — HIGH (ref 70–99)
GLUCOSE BLDC GLUCOMTR-MCNC: 173 MG/DL — HIGH (ref 70–99)
GLUCOSE BLDC GLUCOMTR-MCNC: 175 MG/DL — HIGH (ref 70–99)
GLUCOSE BLDC GLUCOMTR-MCNC: 175 MG/DL — HIGH (ref 70–99)
GLUCOSE BLDC GLUCOMTR-MCNC: 176 MG/DL — HIGH (ref 70–99)
GLUCOSE BLDC GLUCOMTR-MCNC: 177 MG/DL — HIGH (ref 70–99)
GLUCOSE BLDC GLUCOMTR-MCNC: 177 MG/DL — HIGH (ref 70–99)
GLUCOSE BLDC GLUCOMTR-MCNC: 179 MG/DL — HIGH (ref 70–99)
GLUCOSE BLDC GLUCOMTR-MCNC: 180 MG/DL — HIGH (ref 70–99)
GLUCOSE BLDC GLUCOMTR-MCNC: 181 MG/DL — HIGH (ref 70–99)
GLUCOSE BLDC GLUCOMTR-MCNC: 183 MG/DL — HIGH (ref 70–99)
GLUCOSE BLDC GLUCOMTR-MCNC: 184 MG/DL — HIGH (ref 70–99)
GLUCOSE BLDC GLUCOMTR-MCNC: 184 MG/DL — HIGH (ref 70–99)
GLUCOSE BLDC GLUCOMTR-MCNC: 185 MG/DL — HIGH (ref 70–99)
GLUCOSE BLDC GLUCOMTR-MCNC: 188 MG/DL — HIGH (ref 70–99)
GLUCOSE BLDC GLUCOMTR-MCNC: 188 MG/DL — HIGH (ref 70–99)
GLUCOSE BLDC GLUCOMTR-MCNC: 193 MG/DL — HIGH (ref 70–99)
GLUCOSE BLDC GLUCOMTR-MCNC: 194 MG/DL — HIGH (ref 70–99)
GLUCOSE BLDC GLUCOMTR-MCNC: 196 MG/DL — HIGH (ref 70–99)
GLUCOSE BLDC GLUCOMTR-MCNC: 206 MG/DL — HIGH (ref 70–99)
GLUCOSE BLDC GLUCOMTR-MCNC: 212 MG/DL — HIGH (ref 70–99)
GLUCOSE BLDC GLUCOMTR-MCNC: 213 MG/DL — HIGH (ref 70–99)
GLUCOSE BLDC GLUCOMTR-MCNC: 214 MG/DL — HIGH (ref 70–99)
GLUCOSE BLDC GLUCOMTR-MCNC: 228 MG/DL — HIGH (ref 70–99)
GLUCOSE BLDC GLUCOMTR-MCNC: 242 MG/DL — HIGH (ref 70–99)
GLUCOSE BLDC GLUCOMTR-MCNC: 248 MG/DL — HIGH (ref 70–99)
GLUCOSE BLDC GLUCOMTR-MCNC: 262 MG/DL — HIGH (ref 70–99)
GLUCOSE BLDC GLUCOMTR-MCNC: 317 MG/DL — HIGH (ref 70–99)
GLUCOSE BLDC GLUCOMTR-MCNC: 66 MG/DL — LOW (ref 70–99)
GLUCOSE BLDC GLUCOMTR-MCNC: 68 MG/DL — LOW (ref 70–99)
GLUCOSE BLDC GLUCOMTR-MCNC: 72 MG/DL — SIGNIFICANT CHANGE UP (ref 70–99)
GLUCOSE BLDC GLUCOMTR-MCNC: 74 MG/DL — SIGNIFICANT CHANGE UP (ref 70–99)
GLUCOSE BLDC GLUCOMTR-MCNC: 76 MG/DL — SIGNIFICANT CHANGE UP (ref 70–99)
GLUCOSE BLDC GLUCOMTR-MCNC: 77 MG/DL — SIGNIFICANT CHANGE UP (ref 70–99)
GLUCOSE BLDC GLUCOMTR-MCNC: 78 MG/DL — SIGNIFICANT CHANGE UP (ref 70–99)
GLUCOSE BLDC GLUCOMTR-MCNC: 82 MG/DL — SIGNIFICANT CHANGE UP (ref 70–99)
GLUCOSE BLDC GLUCOMTR-MCNC: 84 MG/DL — SIGNIFICANT CHANGE UP (ref 70–99)
GLUCOSE BLDC GLUCOMTR-MCNC: 84 MG/DL — SIGNIFICANT CHANGE UP (ref 70–99)
GLUCOSE BLDC GLUCOMTR-MCNC: 85 MG/DL — SIGNIFICANT CHANGE UP (ref 70–99)
GLUCOSE BLDC GLUCOMTR-MCNC: 85 MG/DL — SIGNIFICANT CHANGE UP (ref 70–99)
GLUCOSE BLDC GLUCOMTR-MCNC: 86 MG/DL — SIGNIFICANT CHANGE UP (ref 70–99)
GLUCOSE BLDC GLUCOMTR-MCNC: 88 MG/DL — SIGNIFICANT CHANGE UP (ref 70–99)
GLUCOSE BLDC GLUCOMTR-MCNC: 90 MG/DL — SIGNIFICANT CHANGE UP (ref 70–99)
GLUCOSE BLDC GLUCOMTR-MCNC: 91 MG/DL — SIGNIFICANT CHANGE UP (ref 70–99)
GLUCOSE BLDC GLUCOMTR-MCNC: 91 MG/DL — SIGNIFICANT CHANGE UP (ref 70–99)
GLUCOSE BLDC GLUCOMTR-MCNC: 92 MG/DL — SIGNIFICANT CHANGE UP (ref 70–99)
GLUCOSE BLDC GLUCOMTR-MCNC: 92 MG/DL — SIGNIFICANT CHANGE UP (ref 70–99)
GLUCOSE BLDC GLUCOMTR-MCNC: 93 MG/DL — SIGNIFICANT CHANGE UP (ref 70–99)
GLUCOSE BLDC GLUCOMTR-MCNC: 93 MG/DL — SIGNIFICANT CHANGE UP (ref 70–99)
GLUCOSE BLDC GLUCOMTR-MCNC: 94 MG/DL — SIGNIFICANT CHANGE UP (ref 70–99)
GLUCOSE BLDC GLUCOMTR-MCNC: 95 MG/DL — SIGNIFICANT CHANGE UP (ref 70–99)
GLUCOSE BLDC GLUCOMTR-MCNC: 97 MG/DL — SIGNIFICANT CHANGE UP (ref 70–99)
GLUCOSE BLDC GLUCOMTR-MCNC: 98 MG/DL — SIGNIFICANT CHANGE UP (ref 70–99)
GLUCOSE BLDC GLUCOMTR-MCNC: 98 MG/DL — SIGNIFICANT CHANGE UP (ref 70–99)
GLUCOSE BLDC GLUCOMTR-MCNC: 99 MG/DL — SIGNIFICANT CHANGE UP (ref 70–99)
GLUCOSE FLD-MCNC: 157 MG/DL — SIGNIFICANT CHANGE UP
GLUCOSE SERPL-MCNC: 102 MG/DL — HIGH (ref 70–99)
GLUCOSE SERPL-MCNC: 106 MG/DL — HIGH (ref 70–99)
GLUCOSE SERPL-MCNC: 107 MG/DL — HIGH (ref 70–99)
GLUCOSE SERPL-MCNC: 107 MG/DL — HIGH (ref 70–99)
GLUCOSE SERPL-MCNC: 108 MG/DL — HIGH (ref 70–99)
GLUCOSE SERPL-MCNC: 108 MG/DL — HIGH (ref 70–99)
GLUCOSE SERPL-MCNC: 111 MG/DL — HIGH (ref 70–99)
GLUCOSE SERPL-MCNC: 113 MG/DL — HIGH (ref 70–99)
GLUCOSE SERPL-MCNC: 113 MG/DL — HIGH (ref 70–99)
GLUCOSE SERPL-MCNC: 117 MG/DL — HIGH (ref 70–99)
GLUCOSE SERPL-MCNC: 121 MG/DL — HIGH (ref 70–99)
GLUCOSE SERPL-MCNC: 121 MG/DL — HIGH (ref 70–99)
GLUCOSE SERPL-MCNC: 123 MG/DL — HIGH (ref 70–99)
GLUCOSE SERPL-MCNC: 123 MG/DL — HIGH (ref 70–99)
GLUCOSE SERPL-MCNC: 125 MG/DL — HIGH (ref 70–99)
GLUCOSE SERPL-MCNC: 126 MG/DL — HIGH (ref 70–99)
GLUCOSE SERPL-MCNC: 127 MG/DL — HIGH (ref 70–99)
GLUCOSE SERPL-MCNC: 128 MG/DL — HIGH (ref 70–99)
GLUCOSE SERPL-MCNC: 130 MG/DL — HIGH (ref 70–99)
GLUCOSE SERPL-MCNC: 130 MG/DL — HIGH (ref 70–99)
GLUCOSE SERPL-MCNC: 131 MG/DL — HIGH (ref 70–99)
GLUCOSE SERPL-MCNC: 132 MG/DL — HIGH (ref 70–99)
GLUCOSE SERPL-MCNC: 132 MG/DL — HIGH (ref 70–99)
GLUCOSE SERPL-MCNC: 133 MG/DL — HIGH (ref 70–99)
GLUCOSE SERPL-MCNC: 134 MG/DL — HIGH (ref 70–99)
GLUCOSE SERPL-MCNC: 135 MG/DL — HIGH (ref 70–99)
GLUCOSE SERPL-MCNC: 135 MG/DL — HIGH (ref 70–99)
GLUCOSE SERPL-MCNC: 136 MG/DL — HIGH (ref 70–99)
GLUCOSE SERPL-MCNC: 136 MG/DL — HIGH (ref 70–99)
GLUCOSE SERPL-MCNC: 137 MG/DL — HIGH (ref 70–99)
GLUCOSE SERPL-MCNC: 137 MG/DL — HIGH (ref 70–99)
GLUCOSE SERPL-MCNC: 138 MG/DL — HIGH (ref 70–99)
GLUCOSE SERPL-MCNC: 139 MG/DL — HIGH (ref 70–99)
GLUCOSE SERPL-MCNC: 140 MG/DL — HIGH (ref 70–99)
GLUCOSE SERPL-MCNC: 142 MG/DL — HIGH (ref 70–99)
GLUCOSE SERPL-MCNC: 143 MG/DL — HIGH (ref 70–99)
GLUCOSE SERPL-MCNC: 145 MG/DL — HIGH (ref 70–99)
GLUCOSE SERPL-MCNC: 146 MG/DL — HIGH (ref 70–99)
GLUCOSE SERPL-MCNC: 147 MG/DL — HIGH (ref 70–99)
GLUCOSE SERPL-MCNC: 148 MG/DL — HIGH (ref 70–99)
GLUCOSE SERPL-MCNC: 149 MG/DL — HIGH (ref 70–99)
GLUCOSE SERPL-MCNC: 151 MG/DL — HIGH (ref 70–99)
GLUCOSE SERPL-MCNC: 152 MG/DL — HIGH (ref 70–99)
GLUCOSE SERPL-MCNC: 153 MG/DL — HIGH (ref 70–99)
GLUCOSE SERPL-MCNC: 153 MG/DL — HIGH (ref 70–99)
GLUCOSE SERPL-MCNC: 155 MG/DL — HIGH (ref 70–99)
GLUCOSE SERPL-MCNC: 156 MG/DL — HIGH (ref 70–99)
GLUCOSE SERPL-MCNC: 157 MG/DL — HIGH (ref 70–99)
GLUCOSE SERPL-MCNC: 158 MG/DL — HIGH (ref 70–99)
GLUCOSE SERPL-MCNC: 163 MG/DL — HIGH (ref 70–99)
GLUCOSE SERPL-MCNC: 163 MG/DL — HIGH (ref 70–99)
GLUCOSE SERPL-MCNC: 167 MG/DL — HIGH (ref 70–99)
GLUCOSE SERPL-MCNC: 168 MG/DL — HIGH (ref 70–99)
GLUCOSE SERPL-MCNC: 168 MG/DL — HIGH (ref 70–99)
GLUCOSE SERPL-MCNC: 170 MG/DL — HIGH (ref 70–99)
GLUCOSE SERPL-MCNC: 173 MG/DL — HIGH (ref 70–99)
GLUCOSE SERPL-MCNC: 177 MG/DL — HIGH (ref 70–99)
GLUCOSE SERPL-MCNC: 180 MG/DL — HIGH (ref 70–99)
GLUCOSE SERPL-MCNC: 182 MG/DL — HIGH (ref 70–99)
GLUCOSE SERPL-MCNC: 185 MG/DL — HIGH (ref 70–99)
GLUCOSE SERPL-MCNC: 190 MG/DL — HIGH (ref 70–99)
GLUCOSE SERPL-MCNC: 199 MG/DL — HIGH (ref 70–99)
GLUCOSE SERPL-MCNC: 204 MG/DL — HIGH (ref 70–99)
GLUCOSE SERPL-MCNC: 209 MG/DL — HIGH (ref 70–99)
GLUCOSE SERPL-MCNC: 225 MG/DL — HIGH (ref 70–99)
GLUCOSE SERPL-MCNC: 230 MG/DL — HIGH (ref 70–99)
GLUCOSE SERPL-MCNC: 240 MG/DL — HIGH (ref 70–99)
GLUCOSE SERPL-MCNC: 244 MG/DL — HIGH (ref 70–99)
GLUCOSE SERPL-MCNC: 264 MG/DL — HIGH (ref 70–99)
GLUCOSE SERPL-MCNC: 271 MG/DL — HIGH (ref 70–99)
GLUCOSE SERPL-MCNC: 273 MG/DL — HIGH (ref 70–99)
GLUCOSE SERPL-MCNC: 287 MG/DL — HIGH (ref 70–99)
GLUCOSE SERPL-MCNC: 320 MG/DL — HIGH (ref 70–99)
GLUCOSE SERPL-MCNC: 331 MG/DL — HIGH (ref 70–99)
GLUCOSE SERPL-MCNC: 68 MG/DL — LOW (ref 70–99)
GLUCOSE SERPL-MCNC: 69 MG/DL — LOW (ref 70–99)
GLUCOSE SERPL-MCNC: 73 MG/DL — SIGNIFICANT CHANGE UP (ref 70–99)
GLUCOSE SERPL-MCNC: 74 MG/DL — SIGNIFICANT CHANGE UP (ref 70–99)
GLUCOSE SERPL-MCNC: 75 MG/DL — SIGNIFICANT CHANGE UP (ref 70–99)
GLUCOSE SERPL-MCNC: 76 MG/DL — SIGNIFICANT CHANGE UP (ref 70–99)
GLUCOSE SERPL-MCNC: 79 MG/DL — SIGNIFICANT CHANGE UP (ref 70–99)
GLUCOSE SERPL-MCNC: 81 MG/DL — SIGNIFICANT CHANGE UP (ref 70–99)
GLUCOSE SERPL-MCNC: 83 MG/DL — SIGNIFICANT CHANGE UP (ref 70–99)
GLUCOSE SERPL-MCNC: 83 MG/DL — SIGNIFICANT CHANGE UP (ref 70–99)
GLUCOSE SERPL-MCNC: 85 MG/DL — SIGNIFICANT CHANGE UP (ref 70–99)
GLUCOSE SERPL-MCNC: 86 MG/DL — SIGNIFICANT CHANGE UP (ref 70–99)
GLUCOSE SERPL-MCNC: 86 MG/DL — SIGNIFICANT CHANGE UP (ref 70–99)
GLUCOSE SERPL-MCNC: 88 MG/DL — SIGNIFICANT CHANGE UP (ref 70–99)
GLUCOSE SERPL-MCNC: 91 MG/DL — SIGNIFICANT CHANGE UP (ref 70–99)
GLUCOSE SERPL-MCNC: 93 MG/DL — SIGNIFICANT CHANGE UP (ref 70–99)
GLUCOSE SERPL-MCNC: 93 MG/DL — SIGNIFICANT CHANGE UP (ref 70–99)
GLUCOSE SERPL-MCNC: 96 MG/DL — SIGNIFICANT CHANGE UP (ref 70–99)
GLUCOSE UR QL: NEGATIVE MG/DL — SIGNIFICANT CHANGE UP
GRAM STN FLD: ABNORMAL
GRAM STN FLD: SIGNIFICANT CHANGE UP
HAPTOGLOB SERPL-MCNC: 42 MG/DL — SIGNIFICANT CHANGE UP (ref 34–200)
HAPTOGLOB SERPL-MCNC: 43 MG/DL — SIGNIFICANT CHANGE UP (ref 34–200)
HAPTOGLOB SERPL-MCNC: 70 MG/DL — SIGNIFICANT CHANGE UP (ref 34–200)
HAPTOGLOB SERPL-MCNC: <10 MG/DL — LOW (ref 34–200)
HAPTOGLOB SERPL-MCNC: SIGNIFICANT CHANGE UP (ref 34–200)
HAV IGG SER QL IA: REACTIVE
HAV IGM SER-ACNC: SIGNIFICANT CHANGE UP
HAV IGM SER-ACNC: SIGNIFICANT CHANGE UP
HBV CORE IGM SER-ACNC: SIGNIFICANT CHANGE UP
HBV CORE IGM SER-ACNC: SIGNIFICANT CHANGE UP
HBV SURFACE AB SER-ACNC: REACTIVE
HBV SURFACE AG SER-ACNC: SIGNIFICANT CHANGE UP
HBV SURFACE AG SER-ACNC: SIGNIFICANT CHANGE UP
HCO3 BLDA-SCNC: 21 MMOL/L — SIGNIFICANT CHANGE UP (ref 21–28)
HCO3 BLDA-SCNC: 37 MMOL/L — HIGH (ref 21–28)
HCO3 BLDA-SCNC: 38 MMOL/L — HIGH (ref 21–28)
HCO3 BLDV-SCNC: 27 MMOL/L — SIGNIFICANT CHANGE UP (ref 22–29)
HCT VFR BLD CALC: 14.4 % — CRITICAL LOW (ref 39–50)
HCT VFR BLD CALC: 16.5 % — CRITICAL LOW (ref 39–50)
HCT VFR BLD CALC: 18.7 % — CRITICAL LOW (ref 39–50)
HCT VFR BLD CALC: 19.7 % — CRITICAL LOW (ref 39–50)
HCT VFR BLD CALC: 19.8 % — CRITICAL LOW (ref 39–50)
HCT VFR BLD CALC: 20 % — CRITICAL LOW (ref 39–50)
HCT VFR BLD CALC: 20.1 % — CRITICAL LOW (ref 39–50)
HCT VFR BLD CALC: 20.1 % — CRITICAL LOW (ref 39–50)
HCT VFR BLD CALC: 20.2 % — CRITICAL LOW (ref 39–50)
HCT VFR BLD CALC: 20.4 % — CRITICAL LOW (ref 39–50)
HCT VFR BLD CALC: 20.4 % — CRITICAL LOW (ref 39–50)
HCT VFR BLD CALC: 20.7 % — CRITICAL LOW (ref 39–50)
HCT VFR BLD CALC: 20.8 % — CRITICAL LOW (ref 39–50)
HCT VFR BLD CALC: 21.1 % — LOW (ref 39–50)
HCT VFR BLD CALC: 21.1 % — LOW (ref 39–50)
HCT VFR BLD CALC: 21.6 % — LOW (ref 39–50)
HCT VFR BLD CALC: 21.6 % — LOW (ref 39–50)
HCT VFR BLD CALC: 21.7 % — LOW (ref 39–50)
HCT VFR BLD CALC: 21.9 % — LOW (ref 39–50)
HCT VFR BLD CALC: 22 % — LOW (ref 39–50)
HCT VFR BLD CALC: 22.3 % — LOW (ref 39–50)
HCT VFR BLD CALC: 22.3 % — LOW (ref 39–50)
HCT VFR BLD CALC: 22.4 % — LOW (ref 39–50)
HCT VFR BLD CALC: 22.7 % — LOW (ref 39–50)
HCT VFR BLD CALC: 22.8 % — LOW (ref 39–50)
HCT VFR BLD CALC: 22.9 % — LOW (ref 39–50)
HCT VFR BLD CALC: 23 % — LOW (ref 39–50)
HCT VFR BLD CALC: 23.1 % — LOW (ref 39–50)
HCT VFR BLD CALC: 23.2 % — LOW (ref 39–50)
HCT VFR BLD CALC: 23.2 % — LOW (ref 39–50)
HCT VFR BLD CALC: 23.3 % — LOW (ref 39–50)
HCT VFR BLD CALC: 23.5 % — LOW (ref 39–50)
HCT VFR BLD CALC: 23.6 % — LOW (ref 39–50)
HCT VFR BLD CALC: 23.7 % — LOW (ref 39–50)
HCT VFR BLD CALC: 23.7 % — LOW (ref 39–50)
HCT VFR BLD CALC: 23.8 % — LOW (ref 39–50)
HCT VFR BLD CALC: 23.8 % — LOW (ref 39–50)
HCT VFR BLD CALC: 23.9 % — LOW (ref 39–50)
HCT VFR BLD CALC: 24 % — LOW (ref 39–50)
HCT VFR BLD CALC: 24 % — LOW (ref 39–50)
HCT VFR BLD CALC: 24.1 % — LOW (ref 39–50)
HCT VFR BLD CALC: 24.2 % — LOW (ref 39–50)
HCT VFR BLD CALC: 24.4 % — LOW (ref 39–50)
HCT VFR BLD CALC: 24.4 % — LOW (ref 39–50)
HCT VFR BLD CALC: 24.6 % — LOW (ref 39–50)
HCT VFR BLD CALC: 24.6 % — LOW (ref 39–50)
HCT VFR BLD CALC: 24.7 % — LOW (ref 39–50)
HCT VFR BLD CALC: 24.7 % — LOW (ref 39–50)
HCT VFR BLD CALC: 24.8 % — LOW (ref 39–50)
HCT VFR BLD CALC: 25 % — LOW (ref 39–50)
HCT VFR BLD CALC: 25.1 % — LOW (ref 39–50)
HCT VFR BLD CALC: 25.1 % — LOW (ref 39–50)
HCT VFR BLD CALC: 25.2 % — LOW (ref 39–50)
HCT VFR BLD CALC: 25.3 % — LOW (ref 39–50)
HCT VFR BLD CALC: 25.5 % — LOW (ref 39–50)
HCT VFR BLD CALC: 25.6 % — LOW (ref 39–50)
HCT VFR BLD CALC: 25.8 % — LOW (ref 39–50)
HCT VFR BLD CALC: 25.9 % — LOW (ref 39–50)
HCT VFR BLD CALC: 26 % — LOW (ref 39–50)
HCT VFR BLD CALC: 26.1 % — LOW (ref 39–50)
HCT VFR BLD CALC: 26.2 % — LOW (ref 39–50)
HCT VFR BLD CALC: 26.3 % — LOW (ref 39–50)
HCT VFR BLD CALC: 26.4 % — LOW (ref 39–50)
HCT VFR BLD CALC: 26.5 % — LOW (ref 39–50)
HCT VFR BLD CALC: 26.5 % — LOW (ref 39–50)
HCT VFR BLD CALC: 26.7 % — LOW (ref 39–50)
HCT VFR BLD CALC: 26.7 % — LOW (ref 39–50)
HCT VFR BLD CALC: 26.8 % — LOW (ref 39–50)
HCT VFR BLD CALC: 26.8 % — LOW (ref 39–50)
HCT VFR BLD CALC: 26.9 % — LOW (ref 39–50)
HCT VFR BLD CALC: 26.9 % — LOW (ref 39–50)
HCT VFR BLD CALC: 27.1 % — LOW (ref 39–50)
HCT VFR BLD CALC: 27.1 % — LOW (ref 39–50)
HCT VFR BLD CALC: 27.2 % — LOW (ref 39–50)
HCT VFR BLD CALC: 27.2 % — LOW (ref 39–50)
HCT VFR BLD CALC: 27.5 % — LOW (ref 39–50)
HCT VFR BLD CALC: 27.5 % — LOW (ref 39–50)
HCT VFR BLD CALC: 27.6 % — LOW (ref 39–50)
HCT VFR BLD CALC: 27.7 % — LOW (ref 39–50)
HCT VFR BLD CALC: 27.8 % — LOW (ref 39–50)
HCT VFR BLD CALC: 27.8 % — LOW (ref 39–50)
HCT VFR BLD CALC: 28.1 % — LOW (ref 39–50)
HCT VFR BLD CALC: 28.5 % — LOW (ref 39–50)
HCT VFR BLD CALC: 28.7 % — LOW (ref 39–50)
HCT VFR BLD CALC: 28.8 % — LOW (ref 39–50)
HCT VFR BLD CALC: 28.8 % — LOW (ref 39–50)
HCT VFR BLD CALC: 29 % — LOW (ref 39–50)
HCT VFR BLD CALC: 29.2 % — LOW (ref 39–50)
HCT VFR BLD CALC: 29.3 % — LOW (ref 39–50)
HCT VFR BLD CALC: 29.3 % — LOW (ref 39–50)
HCT VFR BLD CALC: 29.7 % — LOW (ref 39–50)
HCT VFR BLD CALC: 29.7 % — LOW (ref 39–50)
HCT VFR BLD CALC: 31.1 % — LOW (ref 39–50)
HCT VFR BLD CALC: 31.2 % — LOW (ref 39–50)
HCV AB S/CO SERPL IA: 0.04 S/CO — SIGNIFICANT CHANGE UP
HCV AB S/CO SERPL IA: 0.19 S/CO — SIGNIFICANT CHANGE UP (ref 0–0.99)
HCV AB SERPL-IMP: SIGNIFICANT CHANGE UP
HCV AB SERPL-IMP: SIGNIFICANT CHANGE UP
HDLC SERPL-MCNC: 5 MG/DL — LOW
HDLC SERPL-MCNC: 6 MG/DL — LOW
HDLC SERPL-MCNC: 6 MG/DL — LOW
HGB BLD-MCNC: 10 G/DL — LOW (ref 13–17)
HGB BLD-MCNC: 5 G/DL — CRITICAL LOW (ref 13–17)
HGB BLD-MCNC: 5.7 G/DL — CRITICAL LOW (ref 13–17)
HGB BLD-MCNC: 5.9 G/DL — CRITICAL LOW (ref 13–17)
HGB BLD-MCNC: 6.3 G/DL — CRITICAL LOW (ref 13–17)
HGB BLD-MCNC: 6.5 G/DL — CRITICAL LOW (ref 13–17)
HGB BLD-MCNC: 6.6 G/DL — CRITICAL LOW (ref 13–17)
HGB BLD-MCNC: 6.6 G/DL — CRITICAL LOW (ref 13–17)
HGB BLD-MCNC: 6.7 G/DL — CRITICAL LOW (ref 13–17)
HGB BLD-MCNC: 6.7 G/DL — CRITICAL LOW (ref 13–17)
HGB BLD-MCNC: 6.8 G/DL — CRITICAL LOW (ref 13–17)
HGB BLD-MCNC: 6.9 G/DL — CRITICAL LOW (ref 13–17)
HGB BLD-MCNC: 6.9 G/DL — CRITICAL LOW (ref 13–17)
HGB BLD-MCNC: 7 G/DL — CRITICAL LOW (ref 13–17)
HGB BLD-MCNC: 7.1 G/DL — LOW (ref 13–17)
HGB BLD-MCNC: 7.2 G/DL — LOW (ref 13–17)
HGB BLD-MCNC: 7.2 G/DL — LOW (ref 13–17)
HGB BLD-MCNC: 7.3 G/DL — LOW (ref 13–17)
HGB BLD-MCNC: 7.4 G/DL — LOW (ref 13–17)
HGB BLD-MCNC: 7.5 G/DL — LOW (ref 13–17)
HGB BLD-MCNC: 7.6 G/DL — LOW (ref 13–17)
HGB BLD-MCNC: 7.7 G/DL — LOW (ref 13–17)
HGB BLD-MCNC: 7.8 G/DL — LOW (ref 13–17)
HGB BLD-MCNC: 7.9 G/DL — LOW (ref 13–17)
HGB BLD-MCNC: 8 G/DL — LOW (ref 13–17)
HGB BLD-MCNC: 8.1 G/DL — LOW (ref 13–17)
HGB BLD-MCNC: 8.2 G/DL — LOW (ref 13–17)
HGB BLD-MCNC: 8.3 G/DL — LOW (ref 13–17)
HGB BLD-MCNC: 8.4 G/DL — LOW (ref 13–17)
HGB BLD-MCNC: 8.5 G/DL — LOW (ref 13–17)
HGB BLD-MCNC: 8.6 G/DL — LOW (ref 13–17)
HGB BLD-MCNC: 8.7 G/DL — LOW (ref 13–17)
HGB BLD-MCNC: 8.8 G/DL — LOW (ref 13–17)
HGB BLD-MCNC: 8.9 G/DL — LOW (ref 13–17)
HGB BLD-MCNC: 9 G/DL — LOW (ref 13–17)
HGB BLD-MCNC: 9.1 G/DL — LOW (ref 13–17)
HGB BLD-MCNC: 9.1 G/DL — LOW (ref 13–17)
HGB BLD-MCNC: 9.3 G/DL — LOW (ref 13–17)
HGB BLD-MCNC: 9.4 G/DL — LOW (ref 13–17)
HGB BLD-MCNC: 9.7 G/DL — LOW (ref 13–17)
HGB BLD-MCNC: 9.9 G/DL — LOW (ref 13–17)
HYALINE CASTS # UR AUTO: PRESENT
HYPOCHROMIA BLD QL: SIGNIFICANT CHANGE UP
HYPOCHROMIA BLD QL: SLIGHT — SIGNIFICANT CHANGE UP
IGA FLD-MCNC: 591 MG/DL — HIGH (ref 84–499)
IGG FLD-MCNC: 3602 MG/DL — HIGH (ref 610–1660)
IGM SERPL-MCNC: 220 MG/DL — SIGNIFICANT CHANGE UP (ref 35–242)
IMM GRANULOCYTES NFR BLD AUTO: 0.1 % — SIGNIFICANT CHANGE UP (ref 0–0.9)
IMM GRANULOCYTES NFR BLD AUTO: 0.2 % — SIGNIFICANT CHANGE UP (ref 0–0.9)
IMM GRANULOCYTES NFR BLD AUTO: 0.3 % — SIGNIFICANT CHANGE UP (ref 0–0.9)
IMM GRANULOCYTES NFR BLD AUTO: 0.4 % — SIGNIFICANT CHANGE UP (ref 0–0.9)
IMM GRANULOCYTES NFR BLD AUTO: 0.5 % — SIGNIFICANT CHANGE UP (ref 0–0.9)
IMM GRANULOCYTES NFR BLD AUTO: 0.6 % — SIGNIFICANT CHANGE UP (ref 0–0.9)
IMM GRANULOCYTES NFR BLD AUTO: 0.7 % — SIGNIFICANT CHANGE UP (ref 0–0.9)
IMM GRANULOCYTES NFR BLD AUTO: 0.7 % — SIGNIFICANT CHANGE UP (ref 0–0.9)
IMM GRANULOCYTES NFR BLD AUTO: 0.8 % — SIGNIFICANT CHANGE UP (ref 0–0.9)
IMM GRANULOCYTES NFR BLD AUTO: 0.8 % — SIGNIFICANT CHANGE UP (ref 0–0.9)
IMM GRANULOCYTES NFR BLD AUTO: 1.3 % — HIGH (ref 0–0.9)
INR BLD: 1.21 — HIGH (ref 0.85–1.18)
INR BLD: 1.24 — HIGH (ref 0.85–1.18)
INR BLD: 1.25 — HIGH (ref 0.85–1.18)
INR BLD: 1.26 — HIGH (ref 0.85–1.18)
INR BLD: 1.26 — HIGH (ref 0.85–1.18)
INR BLD: 1.28 — HIGH (ref 0.85–1.18)
INR BLD: 1.29 — HIGH (ref 0.85–1.18)
INR BLD: 1.3 — HIGH (ref 0.85–1.18)
INR BLD: 1.31 — HIGH (ref 0.85–1.18)
INR BLD: 1.33 — HIGH (ref 0.85–1.18)
INR BLD: 1.34 — HIGH (ref 0.85–1.18)
INR BLD: 1.35 — HIGH (ref 0.85–1.18)
INR BLD: 1.35 — HIGH (ref 0.85–1.18)
INR BLD: 1.37 — HIGH (ref 0.85–1.18)
INR BLD: 1.39 — HIGH (ref 0.85–1.18)
INR BLD: 1.4 — HIGH (ref 0.85–1.18)
INR BLD: 1.41 — HIGH (ref 0.85–1.18)
INR BLD: 1.43 — HIGH (ref 0.85–1.18)
INR BLD: 1.44 — HIGH (ref 0.85–1.18)
INR BLD: 1.45 — HIGH (ref 0.85–1.18)
INR BLD: 1.46 — HIGH (ref 0.85–1.18)
INR BLD: 1.5 — HIGH (ref 0.85–1.18)
INR BLD: 1.51 — HIGH (ref 0.85–1.18)
INR BLD: 1.52 — HIGH (ref 0.85–1.18)
INR BLD: 1.53 — HIGH (ref 0.85–1.18)
INR BLD: 1.55 — HIGH (ref 0.85–1.18)
INR BLD: 1.57 — HIGH (ref 0.85–1.18)
INR BLD: 1.57 — HIGH (ref 0.85–1.18)
INR BLD: 1.58 — HIGH (ref 0.85–1.18)
INR BLD: 1.6 — HIGH (ref 0.85–1.18)
INR BLD: 1.63 — HIGH (ref 0.85–1.18)
INR BLD: 1.66 — HIGH (ref 0.85–1.18)
INR BLD: 1.72 — HIGH (ref 0.85–1.18)
INR BLD: 1.89 — HIGH (ref 0.85–1.18)
INR BLD: 2.02 — HIGH (ref 0.85–1.18)
INTERPRETATION 24H UR IFE-IMP: SIGNIFICANT CHANGE UP
IRON SATN MFR SERPL: 13 % — LOW (ref 16–55)
IRON SATN MFR SERPL: 18 % — SIGNIFICANT CHANGE UP (ref 16–55)
IRON SATN MFR SERPL: 21 % — SIGNIFICANT CHANGE UP (ref 16–55)
IRON SATN MFR SERPL: 22 % — SIGNIFICANT CHANGE UP (ref 16–55)
IRON SATN MFR SERPL: 35 UG/DL — LOW (ref 45–165)
IRON SATN MFR SERPL: 37 UG/DL — LOW (ref 45–165)
IRON SATN MFR SERPL: 37 UG/DL — LOW (ref 45–165)
IRON SATN MFR SERPL: 38 UG/DL — LOW (ref 45–165)
KAPPA LC SER QL IFE: 16.94 MG/DL — HIGH (ref 0.33–1.94)
KAPPA/LAMBDA FREE LIGHT CHAIN RATIO, SERUM: 0.77 RATIO — SIGNIFICANT CHANGE UP (ref 0.26–1.65)
KETONES UR-MCNC: ABNORMAL MG/DL
KETONES UR-MCNC: ABNORMAL MG/DL
KETONES UR-MCNC: NEGATIVE MG/DL — SIGNIFICANT CHANGE UP
LACTATE SERPL-SCNC: 0.7 MMOL/L — SIGNIFICANT CHANGE UP (ref 0.5–2)
LACTATE SERPL-SCNC: 0.8 MMOL/L — SIGNIFICANT CHANGE UP (ref 0.5–2)
LACTATE SERPL-SCNC: 1.5 MMOL/L — SIGNIFICANT CHANGE UP (ref 0.5–2)
LACTATE SERPL-SCNC: 1.7 MMOL/L — SIGNIFICANT CHANGE UP (ref 0.5–2)
LACTATE SERPL-SCNC: 2 MMOL/L — SIGNIFICANT CHANGE UP (ref 0.5–2)
LACTATE SERPL-SCNC: 2.3 MMOL/L — HIGH (ref 0.5–2)
LACTATE SERPL-SCNC: 2.3 MMOL/L — HIGH (ref 0.5–2)
LACTATE SERPL-SCNC: 2.4 MMOL/L — HIGH (ref 0.5–2)
LACTATE SERPL-SCNC: 2.6 MMOL/L — HIGH (ref 0.5–2)
LACTATE SERPL-SCNC: 2.8 MMOL/L — HIGH (ref 0.5–2)
LACTATE SERPL-SCNC: 2.9 MMOL/L — HIGH (ref 0.5–2)
LACTATE SERPL-SCNC: 2.9 MMOL/L — HIGH (ref 0.5–2)
LACTATE SERPL-SCNC: 3 MMOL/L — HIGH (ref 0.5–2)
LACTATE SERPL-SCNC: 3.3 MMOL/L — HIGH (ref 0.5–2)
LACTATE SERPL-SCNC: 3.4 MMOL/L — HIGH (ref 0.5–2)
LACTATE SERPL-SCNC: 3.5 MMOL/L — HIGH (ref 0.5–2)
LACTATE SERPL-SCNC: 3.6 MMOL/L — HIGH (ref 0.5–2)
LACTATE SERPL-SCNC: 4.4 MMOL/L — CRITICAL HIGH (ref 0.5–2)
LACTATE SERPL-SCNC: 6.1 MMOL/L — CRITICAL HIGH (ref 0.5–2)
LAMBDA LC SER QL IFE: 22.06 MG/DL — HIGH (ref 0.57–2.63)
LDH SERPL L TO P-CCNC: 193 U/L — SIGNIFICANT CHANGE UP (ref 50–242)
LDH SERPL L TO P-CCNC: 236 U/L — SIGNIFICANT CHANGE UP (ref 50–242)
LDH SERPL L TO P-CCNC: 267 U/L — HIGH (ref 50–242)
LDH SERPL L TO P-CCNC: 304 U/L — HIGH (ref 50–242)
LDH SERPL L TO P-CCNC: 96 U/L — SIGNIFICANT CHANGE UP
LDH SERPL L TO P-CCNC: SIGNIFICANT CHANGE UP (ref 50–242)
LDH SERPL L TO P-CCNC: SIGNIFICANT CHANGE UP (ref 50–242)
LEGIONELLA AG UR QL: NEGATIVE — SIGNIFICANT CHANGE UP
LEUKOCYTE ESTERASE UR-ACNC: ABNORMAL
LEUKOCYTE ESTERASE UR-ACNC: NEGATIVE — SIGNIFICANT CHANGE UP
LIDOCAIN IGE QN: 32 U/L — SIGNIFICANT CHANGE UP (ref 7–60)
LIDOCAIN IGE QN: 45 U/L — SIGNIFICANT CHANGE UP (ref 7–60)
LIPID PNL WITH DIRECT LDL SERPL: 34 MG/DL — SIGNIFICANT CHANGE UP
LIPID PNL WITH DIRECT LDL SERPL: 57 MG/DL — SIGNIFICANT CHANGE UP
LIPID PNL WITH DIRECT LDL SERPL: SIGNIFICANT CHANGE UP MG/DL
LMWH PPP CHRO-ACNC: 0.55 IU/ML — SIGNIFICANT CHANGE UP (ref 0.5–1.1)
LMWH PPP CHRO-ACNC: 0.55 IU/ML — SIGNIFICANT CHANGE UP (ref 0.5–1.1)
LYMPHOCYTES # BLD AUTO: 0 % — LOW (ref 13–44)
LYMPHOCYTES # BLD AUTO: 0 K/UL — LOW (ref 1–3.3)
LYMPHOCYTES # BLD AUTO: 0.1 K/UL — LOW (ref 1–3.3)
LYMPHOCYTES # BLD AUTO: 0.15 K/UL — LOW (ref 1–3.3)
LYMPHOCYTES # BLD AUTO: 0.26 K/UL — LOW (ref 1–3.3)
LYMPHOCYTES # BLD AUTO: 0.28 K/UL — LOW (ref 1–3.3)
LYMPHOCYTES # BLD AUTO: 0.29 K/UL — LOW (ref 1–3.3)
LYMPHOCYTES # BLD AUTO: 0.34 K/UL — LOW (ref 1–3.3)
LYMPHOCYTES # BLD AUTO: 0.35 K/UL — LOW (ref 1–3.3)
LYMPHOCYTES # BLD AUTO: 0.37 K/UL — LOW (ref 1–3.3)
LYMPHOCYTES # BLD AUTO: 0.38 K/UL — LOW (ref 1–3.3)
LYMPHOCYTES # BLD AUTO: 0.38 K/UL — LOW (ref 1–3.3)
LYMPHOCYTES # BLD AUTO: 0.4 K/UL — LOW (ref 1–3.3)
LYMPHOCYTES # BLD AUTO: 0.47 K/UL — LOW (ref 1–3.3)
LYMPHOCYTES # BLD AUTO: 0.49 K/UL — LOW (ref 1–3.3)
LYMPHOCYTES # BLD AUTO: 0.52 K/UL — LOW (ref 1–3.3)
LYMPHOCYTES # BLD AUTO: 0.57 K/UL — LOW (ref 1–3.3)
LYMPHOCYTES # BLD AUTO: 0.61 K/UL — LOW (ref 1–3.3)
LYMPHOCYTES # BLD AUTO: 0.63 K/UL — LOW (ref 1–3.3)
LYMPHOCYTES # BLD AUTO: 0.66 K/UL — LOW (ref 1–3.3)
LYMPHOCYTES # BLD AUTO: 0.67 K/UL — LOW (ref 1–3.3)
LYMPHOCYTES # BLD AUTO: 0.69 K/UL — LOW (ref 1–3.3)
LYMPHOCYTES # BLD AUTO: 0.7 K/UL — LOW (ref 1–3.3)
LYMPHOCYTES # BLD AUTO: 0.72 K/UL — LOW (ref 1–3.3)
LYMPHOCYTES # BLD AUTO: 0.8 K/UL — LOW (ref 1–3.3)
LYMPHOCYTES # BLD AUTO: 0.84 K/UL — LOW (ref 1–3.3)
LYMPHOCYTES # BLD AUTO: 0.84 K/UL — LOW (ref 1–3.3)
LYMPHOCYTES # BLD AUTO: 0.89 K/UL — LOW (ref 1–3.3)
LYMPHOCYTES # BLD AUTO: 0.89 K/UL — LOW (ref 1–3.3)
LYMPHOCYTES # BLD AUTO: 0.9 % — LOW (ref 13–44)
LYMPHOCYTES # BLD AUTO: 0.92 K/UL — LOW (ref 1–3.3)
LYMPHOCYTES # BLD AUTO: 0.96 K/UL — LOW (ref 1–3.3)
LYMPHOCYTES # BLD AUTO: 0.98 K/UL — LOW (ref 1–3.3)
LYMPHOCYTES # BLD AUTO: 0.99 K/UL — LOW (ref 1–3.3)
LYMPHOCYTES # BLD AUTO: 1 K/UL — SIGNIFICANT CHANGE UP (ref 1–3.3)
LYMPHOCYTES # BLD AUTO: 1.02 K/UL — SIGNIFICANT CHANGE UP (ref 1–3.3)
LYMPHOCYTES # BLD AUTO: 1.04 K/UL — SIGNIFICANT CHANGE UP (ref 1–3.3)
LYMPHOCYTES # BLD AUTO: 1.05 K/UL — SIGNIFICANT CHANGE UP (ref 1–3.3)
LYMPHOCYTES # BLD AUTO: 1.08 K/UL — SIGNIFICANT CHANGE UP (ref 1–3.3)
LYMPHOCYTES # BLD AUTO: 1.08 K/UL — SIGNIFICANT CHANGE UP (ref 1–3.3)
LYMPHOCYTES # BLD AUTO: 1.09 K/UL — SIGNIFICANT CHANGE UP (ref 1–3.3)
LYMPHOCYTES # BLD AUTO: 1.13 K/UL — SIGNIFICANT CHANGE UP (ref 1–3.3)
LYMPHOCYTES # BLD AUTO: 1.14 K/UL — SIGNIFICANT CHANGE UP (ref 1–3.3)
LYMPHOCYTES # BLD AUTO: 1.2 K/UL — SIGNIFICANT CHANGE UP (ref 1–3.3)
LYMPHOCYTES # BLD AUTO: 1.29 K/UL — SIGNIFICANT CHANGE UP (ref 1–3.3)
LYMPHOCYTES # BLD AUTO: 1.33 K/UL — SIGNIFICANT CHANGE UP (ref 1–3.3)
LYMPHOCYTES # BLD AUTO: 1.36 K/UL — SIGNIFICANT CHANGE UP (ref 1–3.3)
LYMPHOCYTES # BLD AUTO: 1.41 K/UL — SIGNIFICANT CHANGE UP (ref 1–3.3)
LYMPHOCYTES # BLD AUTO: 1.42 K/UL — SIGNIFICANT CHANGE UP (ref 1–3.3)
LYMPHOCYTES # BLD AUTO: 1.62 K/UL — SIGNIFICANT CHANGE UP (ref 1–3.3)
LYMPHOCYTES # BLD AUTO: 1.7 % — LOW (ref 13–44)
LYMPHOCYTES # BLD AUTO: 10 % — LOW (ref 13–44)
LYMPHOCYTES # BLD AUTO: 10.1 % — LOW (ref 13–44)
LYMPHOCYTES # BLD AUTO: 10.7 % — LOW (ref 13–44)
LYMPHOCYTES # BLD AUTO: 11.7 % — LOW (ref 13–44)
LYMPHOCYTES # BLD AUTO: 11.7 % — LOW (ref 13–44)
LYMPHOCYTES # BLD AUTO: 11.8 % — LOW (ref 13–44)
LYMPHOCYTES # BLD AUTO: 12 % — LOW (ref 13–44)
LYMPHOCYTES # BLD AUTO: 13.2 % — SIGNIFICANT CHANGE UP (ref 13–44)
LYMPHOCYTES # BLD AUTO: 13.5 % — SIGNIFICANT CHANGE UP (ref 13–44)
LYMPHOCYTES # BLD AUTO: 13.6 % — SIGNIFICANT CHANGE UP (ref 13–44)
LYMPHOCYTES # BLD AUTO: 15.5 % — SIGNIFICANT CHANGE UP (ref 13–44)
LYMPHOCYTES # BLD AUTO: 16.4 % — SIGNIFICANT CHANGE UP (ref 13–44)
LYMPHOCYTES # BLD AUTO: 2.6 % — LOW (ref 13–44)
LYMPHOCYTES # BLD AUTO: 2.7 % — LOW (ref 13–44)
LYMPHOCYTES # BLD AUTO: 2.7 % — LOW (ref 13–44)
LYMPHOCYTES # BLD AUTO: 2.8 % — LOW (ref 13–44)
LYMPHOCYTES # BLD AUTO: 2.9 % — LOW (ref 13–44)
LYMPHOCYTES # BLD AUTO: 2.9 % — LOW (ref 13–44)
LYMPHOCYTES # BLD AUTO: 3.5 % — LOW (ref 13–44)
LYMPHOCYTES # BLD AUTO: 3.6 % — LOW (ref 13–44)
LYMPHOCYTES # BLD AUTO: 3.7 % — LOW (ref 13–44)
LYMPHOCYTES # BLD AUTO: 3.8 % — LOW (ref 13–44)
LYMPHOCYTES # BLD AUTO: 3.9 % — LOW (ref 13–44)
LYMPHOCYTES # BLD AUTO: 4.3 % — LOW (ref 13–44)
LYMPHOCYTES # BLD AUTO: 4.4 % — LOW (ref 13–44)
LYMPHOCYTES # BLD AUTO: 4.4 % — LOW (ref 13–44)
LYMPHOCYTES # BLD AUTO: 4.9 % — LOW (ref 13–44)
LYMPHOCYTES # BLD AUTO: 5.3 % — LOW (ref 13–44)
LYMPHOCYTES # BLD AUTO: 5.4 % — LOW (ref 13–44)
LYMPHOCYTES # BLD AUTO: 6 % — LOW (ref 13–44)
LYMPHOCYTES # BLD AUTO: 6.7 % — LOW (ref 13–44)
LYMPHOCYTES # BLD AUTO: 7.3 % — LOW (ref 13–44)
LYMPHOCYTES # BLD AUTO: 7.7 % — LOW (ref 13–44)
LYMPHOCYTES # BLD AUTO: 7.7 % — LOW (ref 13–44)
LYMPHOCYTES # BLD AUTO: 8 % — LOW (ref 13–44)
LYMPHOCYTES # BLD AUTO: 8.1 % — LOW (ref 13–44)
LYMPHOCYTES # BLD AUTO: 8.4 % — LOW (ref 13–44)
LYMPHOCYTES # BLD AUTO: 8.7 % — LOW (ref 13–44)
LYMPHOCYTES # BLD AUTO: 8.8 % — LOW (ref 13–44)
LYMPHOCYTES # BLD AUTO: 8.8 % — LOW (ref 13–44)
LYMPHOCYTES # BLD AUTO: 9.5 % — LOW (ref 13–44)
LYMPHOCYTES # BLD AUTO: 9.6 % — LOW (ref 13–44)
LYMPHOCYTES # FLD: 8 % — SIGNIFICANT CHANGE UP
M PROTEIN 24H UR ELPH-MRATE: PRESENT
M TB IFN-G BLD-IMP: ABNORMAL
M TB IFN-G CD4+ BCKGRND COR BLD-ACNC: 0 IU/ML — SIGNIFICANT CHANGE UP
M TB IFN-G CD4+CD8+ BCKGRND COR BLD-ACNC: 0 IU/ML — SIGNIFICANT CHANGE UP
M-SPIKE: 2.1 G/DL — HIGH (ref 0–0)
MACROCYTES BLD QL: SIGNIFICANT CHANGE UP
MACROCYTES BLD QL: SLIGHT — SIGNIFICANT CHANGE UP
MAGNESIUM SERPL-MCNC: 1.8 MG/DL — SIGNIFICANT CHANGE UP (ref 1.6–2.6)
MAGNESIUM SERPL-MCNC: 1.9 MG/DL — SIGNIFICANT CHANGE UP (ref 1.6–2.6)
MAGNESIUM SERPL-MCNC: 2 MG/DL — SIGNIFICANT CHANGE UP (ref 1.6–2.6)
MAGNESIUM SERPL-MCNC: 2.1 MG/DL — SIGNIFICANT CHANGE UP (ref 1.6–2.6)
MAGNESIUM SERPL-MCNC: 2.2 MG/DL — SIGNIFICANT CHANGE UP (ref 1.6–2.6)
MAGNESIUM SERPL-MCNC: 2.3 MG/DL — SIGNIFICANT CHANGE UP (ref 1.6–2.6)
MAGNESIUM SERPL-MCNC: 2.4 MG/DL — SIGNIFICANT CHANGE UP (ref 1.6–2.6)
MAGNESIUM SERPL-MCNC: 2.5 MG/DL — SIGNIFICANT CHANGE UP (ref 1.6–2.6)
MAGNESIUM SERPL-MCNC: 2.6 MG/DL — SIGNIFICANT CHANGE UP (ref 1.6–2.6)
MAGNESIUM SERPL-MCNC: 2.7 MG/DL — HIGH (ref 1.6–2.6)
MAGNESIUM SERPL-MCNC: 2.8 MG/DL — HIGH (ref 1.6–2.6)
MANUAL SMEAR VERIFICATION: SIGNIFICANT CHANGE UP
MCHC RBC-ENTMCNC: 28 PG — SIGNIFICANT CHANGE UP (ref 27–34)
MCHC RBC-ENTMCNC: 28.1 PG — SIGNIFICANT CHANGE UP (ref 27–34)
MCHC RBC-ENTMCNC: 28.2 PG — SIGNIFICANT CHANGE UP (ref 27–34)
MCHC RBC-ENTMCNC: 28.3 PG — SIGNIFICANT CHANGE UP (ref 27–34)
MCHC RBC-ENTMCNC: 28.3 PG — SIGNIFICANT CHANGE UP (ref 27–34)
MCHC RBC-ENTMCNC: 28.4 PG — SIGNIFICANT CHANGE UP (ref 27–34)
MCHC RBC-ENTMCNC: 28.5 PG — SIGNIFICANT CHANGE UP (ref 27–34)
MCHC RBC-ENTMCNC: 28.6 PG — SIGNIFICANT CHANGE UP (ref 27–34)
MCHC RBC-ENTMCNC: 28.7 PG — SIGNIFICANT CHANGE UP (ref 27–34)
MCHC RBC-ENTMCNC: 28.7 PG — SIGNIFICANT CHANGE UP (ref 27–34)
MCHC RBC-ENTMCNC: 28.9 PG — SIGNIFICANT CHANGE UP (ref 27–34)
MCHC RBC-ENTMCNC: 28.9 PG — SIGNIFICANT CHANGE UP (ref 27–34)
MCHC RBC-ENTMCNC: 29 PG — SIGNIFICANT CHANGE UP (ref 27–34)
MCHC RBC-ENTMCNC: 29.1 PG — SIGNIFICANT CHANGE UP (ref 27–34)
MCHC RBC-ENTMCNC: 29.1 PG — SIGNIFICANT CHANGE UP (ref 27–34)
MCHC RBC-ENTMCNC: 29.2 PG — SIGNIFICANT CHANGE UP (ref 27–34)
MCHC RBC-ENTMCNC: 29.3 PG — SIGNIFICANT CHANGE UP (ref 27–34)
MCHC RBC-ENTMCNC: 29.4 PG — SIGNIFICANT CHANGE UP (ref 27–34)
MCHC RBC-ENTMCNC: 29.4 PG — SIGNIFICANT CHANGE UP (ref 27–34)
MCHC RBC-ENTMCNC: 29.5 PG — SIGNIFICANT CHANGE UP (ref 27–34)
MCHC RBC-ENTMCNC: 29.6 PG — SIGNIFICANT CHANGE UP (ref 27–34)
MCHC RBC-ENTMCNC: 29.7 PG — SIGNIFICANT CHANGE UP (ref 27–34)
MCHC RBC-ENTMCNC: 29.7 PG — SIGNIFICANT CHANGE UP (ref 27–34)
MCHC RBC-ENTMCNC: 29.8 PG — SIGNIFICANT CHANGE UP (ref 27–34)
MCHC RBC-ENTMCNC: 29.8 PG — SIGNIFICANT CHANGE UP (ref 27–34)
MCHC RBC-ENTMCNC: 29.9 PG — SIGNIFICANT CHANGE UP (ref 27–34)
MCHC RBC-ENTMCNC: 30 PG — SIGNIFICANT CHANGE UP (ref 27–34)
MCHC RBC-ENTMCNC: 30.1 GM/DL — LOW (ref 32–36)
MCHC RBC-ENTMCNC: 30.1 PG — SIGNIFICANT CHANGE UP (ref 27–34)
MCHC RBC-ENTMCNC: 30.2 PG — SIGNIFICANT CHANGE UP (ref 27–34)
MCHC RBC-ENTMCNC: 30.3 PG — SIGNIFICANT CHANGE UP (ref 27–34)
MCHC RBC-ENTMCNC: 30.4 PG — SIGNIFICANT CHANGE UP (ref 27–34)
MCHC RBC-ENTMCNC: 30.5 PG — SIGNIFICANT CHANGE UP (ref 27–34)
MCHC RBC-ENTMCNC: 30.6 GM/DL — LOW (ref 32–36)
MCHC RBC-ENTMCNC: 30.6 PG — SIGNIFICANT CHANGE UP (ref 27–34)
MCHC RBC-ENTMCNC: 30.7 GM/DL — LOW (ref 32–36)
MCHC RBC-ENTMCNC: 30.7 PG — SIGNIFICANT CHANGE UP (ref 27–34)
MCHC RBC-ENTMCNC: 30.8 GM/DL — LOW (ref 32–36)
MCHC RBC-ENTMCNC: 30.8 GM/DL — LOW (ref 32–36)
MCHC RBC-ENTMCNC: 30.8 PG — SIGNIFICANT CHANGE UP (ref 27–34)
MCHC RBC-ENTMCNC: 30.9 GM/DL — LOW (ref 32–36)
MCHC RBC-ENTMCNC: 30.9 PG — SIGNIFICANT CHANGE UP (ref 27–34)
MCHC RBC-ENTMCNC: 31 GM/DL — LOW (ref 32–36)
MCHC RBC-ENTMCNC: 31 PG — SIGNIFICANT CHANGE UP (ref 27–34)
MCHC RBC-ENTMCNC: 31.1 GM/DL — LOW (ref 32–36)
MCHC RBC-ENTMCNC: 31.1 GM/DL — LOW (ref 32–36)
MCHC RBC-ENTMCNC: 31.1 PG — SIGNIFICANT CHANGE UP (ref 27–34)
MCHC RBC-ENTMCNC: 31.2 GM/DL — LOW (ref 32–36)
MCHC RBC-ENTMCNC: 31.2 GM/DL — LOW (ref 32–36)
MCHC RBC-ENTMCNC: 31.2 PG — SIGNIFICANT CHANGE UP (ref 27–34)
MCHC RBC-ENTMCNC: 31.3 GM/DL — LOW (ref 32–36)
MCHC RBC-ENTMCNC: 31.3 PG — SIGNIFICANT CHANGE UP (ref 27–34)
MCHC RBC-ENTMCNC: 31.4 GM/DL — LOW (ref 32–36)
MCHC RBC-ENTMCNC: 31.4 PG — SIGNIFICANT CHANGE UP (ref 27–34)
MCHC RBC-ENTMCNC: 31.5 GM/DL — LOW (ref 32–36)
MCHC RBC-ENTMCNC: 31.5 PG — SIGNIFICANT CHANGE UP (ref 27–34)
MCHC RBC-ENTMCNC: 31.6 GM/DL — LOW (ref 32–36)
MCHC RBC-ENTMCNC: 31.6 PG — SIGNIFICANT CHANGE UP (ref 27–34)
MCHC RBC-ENTMCNC: 31.7 GM/DL — LOW (ref 32–36)
MCHC RBC-ENTMCNC: 31.7 PG — SIGNIFICANT CHANGE UP (ref 27–34)
MCHC RBC-ENTMCNC: 31.7 PG — SIGNIFICANT CHANGE UP (ref 27–34)
MCHC RBC-ENTMCNC: 31.8 GM/DL — LOW (ref 32–36)
MCHC RBC-ENTMCNC: 31.8 PG — SIGNIFICANT CHANGE UP (ref 27–34)
MCHC RBC-ENTMCNC: 31.9 GM/DL — LOW (ref 32–36)
MCHC RBC-ENTMCNC: 31.9 GM/DL — LOW (ref 32–36)
MCHC RBC-ENTMCNC: 31.9 PG — SIGNIFICANT CHANGE UP (ref 27–34)
MCHC RBC-ENTMCNC: 31.9 PG — SIGNIFICANT CHANGE UP (ref 27–34)
MCHC RBC-ENTMCNC: 32 GM/DL — SIGNIFICANT CHANGE UP (ref 32–36)
MCHC RBC-ENTMCNC: 32 GM/DL — SIGNIFICANT CHANGE UP (ref 32–36)
MCHC RBC-ENTMCNC: 32 PG — SIGNIFICANT CHANGE UP (ref 27–34)
MCHC RBC-ENTMCNC: 32.1 GM/DL — SIGNIFICANT CHANGE UP (ref 32–36)
MCHC RBC-ENTMCNC: 32.1 GM/DL — SIGNIFICANT CHANGE UP (ref 32–36)
MCHC RBC-ENTMCNC: 32.2 GM/DL — SIGNIFICANT CHANGE UP (ref 32–36)
MCHC RBC-ENTMCNC: 32.3 GM/DL — SIGNIFICANT CHANGE UP (ref 32–36)
MCHC RBC-ENTMCNC: 32.4 GM/DL — SIGNIFICANT CHANGE UP (ref 32–36)
MCHC RBC-ENTMCNC: 32.5 GM/DL — SIGNIFICANT CHANGE UP (ref 32–36)
MCHC RBC-ENTMCNC: 32.6 GM/DL — SIGNIFICANT CHANGE UP (ref 32–36)
MCHC RBC-ENTMCNC: 32.7 GM/DL — SIGNIFICANT CHANGE UP (ref 32–36)
MCHC RBC-ENTMCNC: 32.8 GM/DL — SIGNIFICANT CHANGE UP (ref 32–36)
MCHC RBC-ENTMCNC: 32.9 GM/DL — SIGNIFICANT CHANGE UP (ref 32–36)
MCHC RBC-ENTMCNC: 33.1 GM/DL — SIGNIFICANT CHANGE UP (ref 32–36)
MCHC RBC-ENTMCNC: 33.1 GM/DL — SIGNIFICANT CHANGE UP (ref 32–36)
MCHC RBC-ENTMCNC: 33.2 GM/DL — SIGNIFICANT CHANGE UP (ref 32–36)
MCHC RBC-ENTMCNC: 33.3 GM/DL — SIGNIFICANT CHANGE UP (ref 32–36)
MCHC RBC-ENTMCNC: 33.5 GM/DL — SIGNIFICANT CHANGE UP (ref 32–36)
MCHC RBC-ENTMCNC: 33.6 GM/DL — SIGNIFICANT CHANGE UP (ref 32–36)
MCHC RBC-ENTMCNC: 33.7 GM/DL — SIGNIFICANT CHANGE UP (ref 32–36)
MCHC RBC-ENTMCNC: 34 GM/DL — SIGNIFICANT CHANGE UP (ref 32–36)
MCHC RBC-ENTMCNC: 34.2 GM/DL — SIGNIFICANT CHANGE UP (ref 32–36)
MCHC RBC-ENTMCNC: 34.2 GM/DL — SIGNIFICANT CHANGE UP (ref 32–36)
MCHC RBC-ENTMCNC: 34.3 GM/DL — SIGNIFICANT CHANGE UP (ref 32–36)
MCHC RBC-ENTMCNC: 34.4 GM/DL — SIGNIFICANT CHANGE UP (ref 32–36)
MCHC RBC-ENTMCNC: 34.5 GM/DL — SIGNIFICANT CHANGE UP (ref 32–36)
MCHC RBC-ENTMCNC: 34.5 GM/DL — SIGNIFICANT CHANGE UP (ref 32–36)
MCHC RBC-ENTMCNC: 34.7 GM/DL — SIGNIFICANT CHANGE UP (ref 32–36)
MCHC RBC-ENTMCNC: 34.7 GM/DL — SIGNIFICANT CHANGE UP (ref 32–36)
MCHC RBC-ENTMCNC: 34.8 GM/DL — SIGNIFICANT CHANGE UP (ref 32–36)
MCHC RBC-ENTMCNC: 34.9 GM/DL — SIGNIFICANT CHANGE UP (ref 32–36)
MCHC RBC-ENTMCNC: 35.3 GM/DL — SIGNIFICANT CHANGE UP (ref 32–36)
MCHC RBC-ENTMCNC: 35.5 GM/DL — SIGNIFICANT CHANGE UP (ref 32–36)
MCHC RBC-ENTMCNC: 35.5 GM/DL — SIGNIFICANT CHANGE UP (ref 32–36)
MCV RBC AUTO: 100 FL — SIGNIFICANT CHANGE UP (ref 80–100)
MCV RBC AUTO: 100.3 FL — HIGH (ref 80–100)
MCV RBC AUTO: 100.4 FL — HIGH (ref 80–100)
MCV RBC AUTO: 100.5 FL — HIGH (ref 80–100)
MCV RBC AUTO: 100.9 FL — HIGH (ref 80–100)
MCV RBC AUTO: 101.1 FL — HIGH (ref 80–100)
MCV RBC AUTO: 101.5 FL — HIGH (ref 80–100)
MCV RBC AUTO: 102.2 FL — HIGH (ref 80–100)
MCV RBC AUTO: 102.8 FL — HIGH (ref 80–100)
MCV RBC AUTO: 103.2 FL — HIGH (ref 80–100)
MCV RBC AUTO: 84.1 FL — SIGNIFICANT CHANGE UP (ref 80–100)
MCV RBC AUTO: 84.2 FL — SIGNIFICANT CHANGE UP (ref 80–100)
MCV RBC AUTO: 84.9 FL — SIGNIFICANT CHANGE UP (ref 80–100)
MCV RBC AUTO: 85.4 FL — SIGNIFICANT CHANGE UP (ref 80–100)
MCV RBC AUTO: 85.6 FL — SIGNIFICANT CHANGE UP (ref 80–100)
MCV RBC AUTO: 85.9 FL — SIGNIFICANT CHANGE UP (ref 80–100)
MCV RBC AUTO: 85.9 FL — SIGNIFICANT CHANGE UP (ref 80–100)
MCV RBC AUTO: 86.1 FL — SIGNIFICANT CHANGE UP (ref 80–100)
MCV RBC AUTO: 86.1 FL — SIGNIFICANT CHANGE UP (ref 80–100)
MCV RBC AUTO: 86.2 FL — SIGNIFICANT CHANGE UP (ref 80–100)
MCV RBC AUTO: 86.3 FL — SIGNIFICANT CHANGE UP (ref 80–100)
MCV RBC AUTO: 86.7 FL — SIGNIFICANT CHANGE UP (ref 80–100)
MCV RBC AUTO: 86.8 FL — SIGNIFICANT CHANGE UP (ref 80–100)
MCV RBC AUTO: 87.1 FL — SIGNIFICANT CHANGE UP (ref 80–100)
MCV RBC AUTO: 87.2 FL — SIGNIFICANT CHANGE UP (ref 80–100)
MCV RBC AUTO: 87.3 FL — SIGNIFICANT CHANGE UP (ref 80–100)
MCV RBC AUTO: 87.5 FL — SIGNIFICANT CHANGE UP (ref 80–100)
MCV RBC AUTO: 87.6 FL — SIGNIFICANT CHANGE UP (ref 80–100)
MCV RBC AUTO: 87.7 FL — SIGNIFICANT CHANGE UP (ref 80–100)
MCV RBC AUTO: 87.8 FL — SIGNIFICANT CHANGE UP (ref 80–100)
MCV RBC AUTO: 87.9 FL — SIGNIFICANT CHANGE UP (ref 80–100)
MCV RBC AUTO: 87.9 FL — SIGNIFICANT CHANGE UP (ref 80–100)
MCV RBC AUTO: 88.1 FL — SIGNIFICANT CHANGE UP (ref 80–100)
MCV RBC AUTO: 88.4 FL — SIGNIFICANT CHANGE UP (ref 80–100)
MCV RBC AUTO: 88.7 FL — SIGNIFICANT CHANGE UP (ref 80–100)
MCV RBC AUTO: 88.8 FL — SIGNIFICANT CHANGE UP (ref 80–100)
MCV RBC AUTO: 88.9 FL — SIGNIFICANT CHANGE UP (ref 80–100)
MCV RBC AUTO: 88.9 FL — SIGNIFICANT CHANGE UP (ref 80–100)
MCV RBC AUTO: 89.1 FL — SIGNIFICANT CHANGE UP (ref 80–100)
MCV RBC AUTO: 89.2 FL — SIGNIFICANT CHANGE UP (ref 80–100)
MCV RBC AUTO: 89.6 FL — SIGNIFICANT CHANGE UP (ref 80–100)
MCV RBC AUTO: 90 FL — SIGNIFICANT CHANGE UP (ref 80–100)
MCV RBC AUTO: 90 FL — SIGNIFICANT CHANGE UP (ref 80–100)
MCV RBC AUTO: 90.1 FL — SIGNIFICANT CHANGE UP (ref 80–100)
MCV RBC AUTO: 90.2 FL — SIGNIFICANT CHANGE UP (ref 80–100)
MCV RBC AUTO: 90.3 FL — SIGNIFICANT CHANGE UP (ref 80–100)
MCV RBC AUTO: 90.5 FL — SIGNIFICANT CHANGE UP (ref 80–100)
MCV RBC AUTO: 90.6 FL — SIGNIFICANT CHANGE UP (ref 80–100)
MCV RBC AUTO: 90.7 FL — SIGNIFICANT CHANGE UP (ref 80–100)
MCV RBC AUTO: 90.8 FL — SIGNIFICANT CHANGE UP (ref 80–100)
MCV RBC AUTO: 90.9 FL — SIGNIFICANT CHANGE UP (ref 80–100)
MCV RBC AUTO: 91 FL — SIGNIFICANT CHANGE UP (ref 80–100)
MCV RBC AUTO: 91.1 FL — SIGNIFICANT CHANGE UP (ref 80–100)
MCV RBC AUTO: 91.2 FL — SIGNIFICANT CHANGE UP (ref 80–100)
MCV RBC AUTO: 91.3 FL — SIGNIFICANT CHANGE UP (ref 80–100)
MCV RBC AUTO: 91.4 FL — SIGNIFICANT CHANGE UP (ref 80–100)
MCV RBC AUTO: 91.6 FL — SIGNIFICANT CHANGE UP (ref 80–100)
MCV RBC AUTO: 91.9 FL — SIGNIFICANT CHANGE UP (ref 80–100)
MCV RBC AUTO: 92 FL — SIGNIFICANT CHANGE UP (ref 80–100)
MCV RBC AUTO: 92.1 FL — SIGNIFICANT CHANGE UP (ref 80–100)
MCV RBC AUTO: 92.2 FL — SIGNIFICANT CHANGE UP (ref 80–100)
MCV RBC AUTO: 92.2 FL — SIGNIFICANT CHANGE UP (ref 80–100)
MCV RBC AUTO: 92.4 FL — SIGNIFICANT CHANGE UP (ref 80–100)
MCV RBC AUTO: 92.5 FL — SIGNIFICANT CHANGE UP (ref 80–100)
MCV RBC AUTO: 92.6 FL — SIGNIFICANT CHANGE UP (ref 80–100)
MCV RBC AUTO: 92.6 FL — SIGNIFICANT CHANGE UP (ref 80–100)
MCV RBC AUTO: 92.7 FL — SIGNIFICANT CHANGE UP (ref 80–100)
MCV RBC AUTO: 93 FL — SIGNIFICANT CHANGE UP (ref 80–100)
MCV RBC AUTO: 93.3 FL — SIGNIFICANT CHANGE UP (ref 80–100)
MCV RBC AUTO: 93.3 FL — SIGNIFICANT CHANGE UP (ref 80–100)
MCV RBC AUTO: 93.4 FL — SIGNIFICANT CHANGE UP (ref 80–100)
MCV RBC AUTO: 93.5 FL — SIGNIFICANT CHANGE UP (ref 80–100)
MCV RBC AUTO: 93.6 FL — SIGNIFICANT CHANGE UP (ref 80–100)
MCV RBC AUTO: 93.7 FL — SIGNIFICANT CHANGE UP (ref 80–100)
MCV RBC AUTO: 93.9 FL — SIGNIFICANT CHANGE UP (ref 80–100)
MCV RBC AUTO: 94 FL — SIGNIFICANT CHANGE UP (ref 80–100)
MCV RBC AUTO: 94.3 FL — SIGNIFICANT CHANGE UP (ref 80–100)
MCV RBC AUTO: 94.4 FL — SIGNIFICANT CHANGE UP (ref 80–100)
MCV RBC AUTO: 94.4 FL — SIGNIFICANT CHANGE UP (ref 80–100)
MCV RBC AUTO: 94.6 FL — SIGNIFICANT CHANGE UP (ref 80–100)
MCV RBC AUTO: 94.8 FL — SIGNIFICANT CHANGE UP (ref 80–100)
MCV RBC AUTO: 94.9 FL — SIGNIFICANT CHANGE UP (ref 80–100)
MCV RBC AUTO: 95.1 FL — SIGNIFICANT CHANGE UP (ref 80–100)
MCV RBC AUTO: 95.2 FL — SIGNIFICANT CHANGE UP (ref 80–100)
MCV RBC AUTO: 95.7 FL — SIGNIFICANT CHANGE UP (ref 80–100)
MCV RBC AUTO: 95.8 FL — SIGNIFICANT CHANGE UP (ref 80–100)
MCV RBC AUTO: 96 FL — SIGNIFICANT CHANGE UP (ref 80–100)
MCV RBC AUTO: 96.3 FL — SIGNIFICANT CHANGE UP (ref 80–100)
MCV RBC AUTO: 96.6 FL — SIGNIFICANT CHANGE UP (ref 80–100)
MCV RBC AUTO: 96.9 FL — SIGNIFICANT CHANGE UP (ref 80–100)
MCV RBC AUTO: 97.1 FL — SIGNIFICANT CHANGE UP (ref 80–100)
MCV RBC AUTO: 97.1 FL — SIGNIFICANT CHANGE UP (ref 80–100)
MCV RBC AUTO: 97.3 FL — SIGNIFICANT CHANGE UP (ref 80–100)
MCV RBC AUTO: 97.4 FL — SIGNIFICANT CHANGE UP (ref 80–100)
MCV RBC AUTO: 97.6 FL — SIGNIFICANT CHANGE UP (ref 80–100)
MCV RBC AUTO: 97.7 FL — SIGNIFICANT CHANGE UP (ref 80–100)
MCV RBC AUTO: 97.7 FL — SIGNIFICANT CHANGE UP (ref 80–100)
MCV RBC AUTO: 97.8 FL — SIGNIFICANT CHANGE UP (ref 80–100)
MCV RBC AUTO: 97.9 FL — SIGNIFICANT CHANGE UP (ref 80–100)
MCV RBC AUTO: 97.9 FL — SIGNIFICANT CHANGE UP (ref 80–100)
MCV RBC AUTO: 98 FL — SIGNIFICANT CHANGE UP (ref 80–100)
MCV RBC AUTO: 98 FL — SIGNIFICANT CHANGE UP (ref 80–100)
MCV RBC AUTO: 98.1 FL — SIGNIFICANT CHANGE UP (ref 80–100)
MCV RBC AUTO: 98.2 FL — SIGNIFICANT CHANGE UP (ref 80–100)
MCV RBC AUTO: 98.2 FL — SIGNIFICANT CHANGE UP (ref 80–100)
MCV RBC AUTO: 98.3 FL — SIGNIFICANT CHANGE UP (ref 80–100)
MCV RBC AUTO: 98.4 FL — SIGNIFICANT CHANGE UP (ref 80–100)
MCV RBC AUTO: 98.4 FL — SIGNIFICANT CHANGE UP (ref 80–100)
MCV RBC AUTO: 98.5 FL — SIGNIFICANT CHANGE UP (ref 80–100)
MCV RBC AUTO: 98.5 FL — SIGNIFICANT CHANGE UP (ref 80–100)
MCV RBC AUTO: 98.6 FL — SIGNIFICANT CHANGE UP (ref 80–100)
MCV RBC AUTO: 98.6 FL — SIGNIFICANT CHANGE UP (ref 80–100)
MCV RBC AUTO: 98.8 FL — SIGNIFICANT CHANGE UP (ref 80–100)
MCV RBC AUTO: 99 FL — SIGNIFICANT CHANGE UP (ref 80–100)
MCV RBC AUTO: 99.1 FL — SIGNIFICANT CHANGE UP (ref 80–100)
MCV RBC AUTO: 99.1 FL — SIGNIFICANT CHANGE UP (ref 80–100)
MCV RBC AUTO: 99.2 FL — SIGNIFICANT CHANGE UP (ref 80–100)
MCV RBC AUTO: 99.2 FL — SIGNIFICANT CHANGE UP (ref 80–100)
MCV RBC AUTO: 99.6 FL — SIGNIFICANT CHANGE UP (ref 80–100)
MESOTHL CELL # FLD: 3 % — SIGNIFICANT CHANGE UP
METAMYELOCYTES # FLD: 0.9 % — HIGH (ref 0–0)
METAMYELOCYTES # FLD: 0.9 % — HIGH (ref 0–0)
METHOD TYPE: SIGNIFICANT CHANGE UP
MICROCYTES BLD QL: SLIGHT — SIGNIFICANT CHANGE UP
MONOCYTES # BLD AUTO: 0.17 K/UL — SIGNIFICANT CHANGE UP (ref 0–0.9)
MONOCYTES # BLD AUTO: 0.18 K/UL — SIGNIFICANT CHANGE UP (ref 0–0.9)
MONOCYTES # BLD AUTO: 0.28 K/UL — SIGNIFICANT CHANGE UP (ref 0–0.9)
MONOCYTES # BLD AUTO: 0.29 K/UL — SIGNIFICANT CHANGE UP (ref 0–0.9)
MONOCYTES # BLD AUTO: 0.3 K/UL — SIGNIFICANT CHANGE UP (ref 0–0.9)
MONOCYTES # BLD AUTO: 0.37 K/UL — SIGNIFICANT CHANGE UP (ref 0–0.9)
MONOCYTES # BLD AUTO: 0.4 K/UL — SIGNIFICANT CHANGE UP (ref 0–0.9)
MONOCYTES # BLD AUTO: 0.41 K/UL — SIGNIFICANT CHANGE UP (ref 0–0.9)
MONOCYTES # BLD AUTO: 0.44 K/UL — SIGNIFICANT CHANGE UP (ref 0–0.9)
MONOCYTES # BLD AUTO: 0.5 K/UL — SIGNIFICANT CHANGE UP (ref 0–0.9)
MONOCYTES # BLD AUTO: 0.55 K/UL — SIGNIFICANT CHANGE UP (ref 0–0.9)
MONOCYTES # BLD AUTO: 0.59 K/UL — SIGNIFICANT CHANGE UP (ref 0–0.9)
MONOCYTES # BLD AUTO: 0.63 K/UL — SIGNIFICANT CHANGE UP (ref 0–0.9)
MONOCYTES # BLD AUTO: 0.63 K/UL — SIGNIFICANT CHANGE UP (ref 0–0.9)
MONOCYTES # BLD AUTO: 0.69 K/UL — SIGNIFICANT CHANGE UP (ref 0–0.9)
MONOCYTES # BLD AUTO: 0.73 K/UL — SIGNIFICANT CHANGE UP (ref 0–0.9)
MONOCYTES # BLD AUTO: 0.74 K/UL — SIGNIFICANT CHANGE UP (ref 0–0.9)
MONOCYTES # BLD AUTO: 0.75 K/UL — SIGNIFICANT CHANGE UP (ref 0–0.9)
MONOCYTES # BLD AUTO: 0.77 K/UL — SIGNIFICANT CHANGE UP (ref 0–0.9)
MONOCYTES # BLD AUTO: 0.78 K/UL — SIGNIFICANT CHANGE UP (ref 0–0.9)
MONOCYTES # BLD AUTO: 0.8 K/UL — SIGNIFICANT CHANGE UP (ref 0–0.9)
MONOCYTES # BLD AUTO: 0.83 K/UL — SIGNIFICANT CHANGE UP (ref 0–0.9)
MONOCYTES # BLD AUTO: 0.85 K/UL — SIGNIFICANT CHANGE UP (ref 0–0.9)
MONOCYTES # BLD AUTO: 0.85 K/UL — SIGNIFICANT CHANGE UP (ref 0–0.9)
MONOCYTES # BLD AUTO: 0.86 K/UL — SIGNIFICANT CHANGE UP (ref 0–0.9)
MONOCYTES # BLD AUTO: 0.86 K/UL — SIGNIFICANT CHANGE UP (ref 0–0.9)
MONOCYTES # BLD AUTO: 0.87 K/UL — SIGNIFICANT CHANGE UP (ref 0–0.9)
MONOCYTES # BLD AUTO: 0.88 K/UL — SIGNIFICANT CHANGE UP (ref 0–0.9)
MONOCYTES # BLD AUTO: 0.91 K/UL — HIGH (ref 0–0.9)
MONOCYTES # BLD AUTO: 0.91 K/UL — HIGH (ref 0–0.9)
MONOCYTES # BLD AUTO: 0.92 K/UL — HIGH (ref 0–0.9)
MONOCYTES # BLD AUTO: 0.93 K/UL — HIGH (ref 0–0.9)
MONOCYTES # BLD AUTO: 0.94 K/UL — HIGH (ref 0–0.9)
MONOCYTES # BLD AUTO: 0.96 K/UL — HIGH (ref 0–0.9)
MONOCYTES # BLD AUTO: 1 K/UL — HIGH (ref 0–0.9)
MONOCYTES # BLD AUTO: 1 K/UL — HIGH (ref 0–0.9)
MONOCYTES # BLD AUTO: 1.02 K/UL — HIGH (ref 0–0.9)
MONOCYTES # BLD AUTO: 1.03 K/UL — HIGH (ref 0–0.9)
MONOCYTES # BLD AUTO: 1.1 K/UL — HIGH (ref 0–0.9)
MONOCYTES # BLD AUTO: 1.11 K/UL — HIGH (ref 0–0.9)
MONOCYTES # BLD AUTO: 1.12 K/UL — HIGH (ref 0–0.9)
MONOCYTES # BLD AUTO: 1.15 K/UL — HIGH (ref 0–0.9)
MONOCYTES # BLD AUTO: 1.2 K/UL — HIGH (ref 0–0.9)
MONOCYTES # BLD AUTO: 1.21 K/UL — HIGH (ref 0–0.9)
MONOCYTES # BLD AUTO: 1.23 K/UL — HIGH (ref 0–0.9)
MONOCYTES # BLD AUTO: 1.26 K/UL — HIGH (ref 0–0.9)
MONOCYTES # BLD AUTO: 1.29 K/UL — HIGH (ref 0–0.9)
MONOCYTES # BLD AUTO: 1.3 K/UL — HIGH (ref 0–0.9)
MONOCYTES # BLD AUTO: 1.34 K/UL — HIGH (ref 0–0.9)
MONOCYTES # BLD AUTO: 1.44 K/UL — HIGH (ref 0–0.9)
MONOCYTES NFR BLD AUTO: 1.7 % — LOW (ref 2–14)
MONOCYTES NFR BLD AUTO: 1.7 % — LOW (ref 2–14)
MONOCYTES NFR BLD AUTO: 1.8 % — LOW (ref 2–14)
MONOCYTES NFR BLD AUTO: 10.2 % — SIGNIFICANT CHANGE UP (ref 2–14)
MONOCYTES NFR BLD AUTO: 10.2 % — SIGNIFICANT CHANGE UP (ref 2–14)
MONOCYTES NFR BLD AUTO: 10.8 % — SIGNIFICANT CHANGE UP (ref 2–14)
MONOCYTES NFR BLD AUTO: 11.1 % — SIGNIFICANT CHANGE UP (ref 2–14)
MONOCYTES NFR BLD AUTO: 11.5 % — SIGNIFICANT CHANGE UP (ref 2–14)
MONOCYTES NFR BLD AUTO: 12.3 % — SIGNIFICANT CHANGE UP (ref 2–14)
MONOCYTES NFR BLD AUTO: 12.5 % — SIGNIFICANT CHANGE UP (ref 2–14)
MONOCYTES NFR BLD AUTO: 2.5 % — SIGNIFICANT CHANGE UP (ref 2–14)
MONOCYTES NFR BLD AUTO: 2.6 % — SIGNIFICANT CHANGE UP (ref 2–14)
MONOCYTES NFR BLD AUTO: 2.7 % — SIGNIFICANT CHANGE UP (ref 2–14)
MONOCYTES NFR BLD AUTO: 3.1 % — SIGNIFICANT CHANGE UP (ref 2–14)
MONOCYTES NFR BLD AUTO: 3.5 % — SIGNIFICANT CHANGE UP (ref 2–14)
MONOCYTES NFR BLD AUTO: 4.4 % — SIGNIFICANT CHANGE UP (ref 2–14)
MONOCYTES NFR BLD AUTO: 5 % — SIGNIFICANT CHANGE UP (ref 2–14)
MONOCYTES NFR BLD AUTO: 5.2 % — SIGNIFICANT CHANGE UP (ref 2–14)
MONOCYTES NFR BLD AUTO: 5.3 % — SIGNIFICANT CHANGE UP (ref 2–14)
MONOCYTES NFR BLD AUTO: 5.8 % — SIGNIFICANT CHANGE UP (ref 2–14)
MONOCYTES NFR BLD AUTO: 6.1 % — SIGNIFICANT CHANGE UP (ref 2–14)
MONOCYTES NFR BLD AUTO: 6.4 % — SIGNIFICANT CHANGE UP (ref 2–14)
MONOCYTES NFR BLD AUTO: 7 % — SIGNIFICANT CHANGE UP (ref 2–14)
MONOCYTES NFR BLD AUTO: 7 % — SIGNIFICANT CHANGE UP (ref 2–14)
MONOCYTES NFR BLD AUTO: 7.2 % — SIGNIFICANT CHANGE UP (ref 2–14)
MONOCYTES NFR BLD AUTO: 7.2 % — SIGNIFICANT CHANGE UP (ref 2–14)
MONOCYTES NFR BLD AUTO: 7.3 % — SIGNIFICANT CHANGE UP (ref 2–14)
MONOCYTES NFR BLD AUTO: 7.4 % — SIGNIFICANT CHANGE UP (ref 2–14)
MONOCYTES NFR BLD AUTO: 7.6 % — SIGNIFICANT CHANGE UP (ref 2–14)
MONOCYTES NFR BLD AUTO: 7.9 % — SIGNIFICANT CHANGE UP (ref 2–14)
MONOCYTES NFR BLD AUTO: 8 % — SIGNIFICANT CHANGE UP (ref 2–14)
MONOCYTES NFR BLD AUTO: 8 % — SIGNIFICANT CHANGE UP (ref 2–14)
MONOCYTES NFR BLD AUTO: 8.2 % — SIGNIFICANT CHANGE UP (ref 2–14)
MONOCYTES NFR BLD AUTO: 8.3 % — SIGNIFICANT CHANGE UP (ref 2–14)
MONOCYTES NFR BLD AUTO: 8.7 % — SIGNIFICANT CHANGE UP (ref 2–14)
MONOCYTES NFR BLD AUTO: 8.7 % — SIGNIFICANT CHANGE UP (ref 2–14)
MONOCYTES NFR BLD AUTO: 8.8 % — SIGNIFICANT CHANGE UP (ref 2–14)
MONOCYTES NFR BLD AUTO: 8.8 % — SIGNIFICANT CHANGE UP (ref 2–14)
MONOCYTES NFR BLD AUTO: 8.9 % — SIGNIFICANT CHANGE UP (ref 2–14)
MONOCYTES NFR BLD AUTO: 9 % — SIGNIFICANT CHANGE UP (ref 2–14)
MONOCYTES NFR BLD AUTO: 9 % — SIGNIFICANT CHANGE UP (ref 2–14)
MONOCYTES NFR BLD AUTO: 9.2 % — SIGNIFICANT CHANGE UP (ref 2–14)
MONOCYTES NFR BLD AUTO: 9.4 % — SIGNIFICANT CHANGE UP (ref 2–14)
MONOCYTES NFR BLD AUTO: 9.7 % — SIGNIFICANT CHANGE UP (ref 2–14)
MONOCYTES NFR BLD AUTO: 9.7 % — SIGNIFICANT CHANGE UP (ref 2–14)
MONOS+MACROS # FLD: 61 % — SIGNIFICANT CHANGE UP
MRSA PCR RESULT.: NEGATIVE — SIGNIFICANT CHANGE UP
MRSA PCR RESULT.: NEGATIVE — SIGNIFICANT CHANGE UP
MSSA DNA SPEC QL NAA+PROBE: SIGNIFICANT CHANGE UP
NEUTROPHILS # BLD AUTO: 10.01 K/UL — HIGH (ref 1.8–7.4)
NEUTROPHILS # BLD AUTO: 10.09 K/UL — HIGH (ref 1.8–7.4)
NEUTROPHILS # BLD AUTO: 10.25 K/UL — HIGH (ref 1.8–7.4)
NEUTROPHILS # BLD AUTO: 10.56 K/UL — HIGH (ref 1.8–7.4)
NEUTROPHILS # BLD AUTO: 10.78 K/UL — HIGH (ref 1.8–7.4)
NEUTROPHILS # BLD AUTO: 11.01 K/UL — HIGH (ref 1.8–7.4)
NEUTROPHILS # BLD AUTO: 11.26 K/UL — HIGH (ref 1.8–7.4)
NEUTROPHILS # BLD AUTO: 11.44 K/UL — HIGH (ref 1.8–7.4)
NEUTROPHILS # BLD AUTO: 12.16 K/UL — HIGH (ref 1.8–7.4)
NEUTROPHILS # BLD AUTO: 12.29 K/UL — HIGH (ref 1.8–7.4)
NEUTROPHILS # BLD AUTO: 12.95 K/UL — HIGH (ref 1.8–7.4)
NEUTROPHILS # BLD AUTO: 13.08 K/UL — HIGH (ref 1.8–7.4)
NEUTROPHILS # BLD AUTO: 13.18 K/UL — HIGH (ref 1.8–7.4)
NEUTROPHILS # BLD AUTO: 13.29 K/UL — HIGH (ref 1.8–7.4)
NEUTROPHILS # BLD AUTO: 13.4 K/UL — HIGH (ref 1.8–7.4)
NEUTROPHILS # BLD AUTO: 14.19 K/UL — HIGH (ref 1.8–7.4)
NEUTROPHILS # BLD AUTO: 15.05 K/UL — HIGH (ref 1.8–7.4)
NEUTROPHILS # BLD AUTO: 15.75 K/UL — HIGH (ref 1.8–7.4)
NEUTROPHILS # BLD AUTO: 16.12 K/UL — HIGH (ref 1.8–7.4)
NEUTROPHILS # BLD AUTO: 16.77 K/UL — HIGH (ref 1.8–7.4)
NEUTROPHILS # BLD AUTO: 17.22 K/UL — HIGH (ref 1.8–7.4)
NEUTROPHILS # BLD AUTO: 18.31 K/UL — HIGH (ref 1.8–7.4)
NEUTROPHILS # BLD AUTO: 21.71 K/UL — HIGH (ref 1.8–7.4)
NEUTROPHILS # BLD AUTO: 23.52 K/UL — HIGH (ref 1.8–7.4)
NEUTROPHILS # BLD AUTO: 5.14 K/UL — SIGNIFICANT CHANGE UP (ref 1.8–7.4)
NEUTROPHILS # BLD AUTO: 5.47 K/UL — SIGNIFICANT CHANGE UP (ref 1.8–7.4)
NEUTROPHILS # BLD AUTO: 5.47 K/UL — SIGNIFICANT CHANGE UP (ref 1.8–7.4)
NEUTROPHILS # BLD AUTO: 5.63 K/UL — SIGNIFICANT CHANGE UP (ref 1.8–7.4)
NEUTROPHILS # BLD AUTO: 6.07 K/UL — SIGNIFICANT CHANGE UP (ref 1.8–7.4)
NEUTROPHILS # BLD AUTO: 6.23 K/UL — SIGNIFICANT CHANGE UP (ref 1.8–7.4)
NEUTROPHILS # BLD AUTO: 6.3 K/UL — SIGNIFICANT CHANGE UP (ref 1.8–7.4)
NEUTROPHILS # BLD AUTO: 6.32 K/UL — SIGNIFICANT CHANGE UP (ref 1.8–7.4)
NEUTROPHILS # BLD AUTO: 6.41 K/UL — SIGNIFICANT CHANGE UP (ref 1.8–7.4)
NEUTROPHILS # BLD AUTO: 7.18 K/UL — SIGNIFICANT CHANGE UP (ref 1.8–7.4)
NEUTROPHILS # BLD AUTO: 7.39 K/UL — SIGNIFICANT CHANGE UP (ref 1.8–7.4)
NEUTROPHILS # BLD AUTO: 7.44 K/UL — HIGH (ref 1.8–7.4)
NEUTROPHILS # BLD AUTO: 7.55 K/UL — HIGH (ref 1.8–7.4)
NEUTROPHILS # BLD AUTO: 7.61 K/UL — HIGH (ref 1.8–7.4)
NEUTROPHILS # BLD AUTO: 7.75 K/UL — HIGH (ref 1.8–7.4)
NEUTROPHILS # BLD AUTO: 8.02 K/UL — HIGH (ref 1.8–7.4)
NEUTROPHILS # BLD AUTO: 8.03 K/UL — HIGH (ref 1.8–7.4)
NEUTROPHILS # BLD AUTO: 8.11 K/UL — HIGH (ref 1.8–7.4)
NEUTROPHILS # BLD AUTO: 8.43 K/UL — HIGH (ref 1.8–7.4)
NEUTROPHILS # BLD AUTO: 8.43 K/UL — HIGH (ref 1.8–7.4)
NEUTROPHILS # BLD AUTO: 8.62 K/UL — HIGH (ref 1.8–7.4)
NEUTROPHILS # BLD AUTO: 8.66 K/UL — HIGH (ref 1.8–7.4)
NEUTROPHILS # BLD AUTO: 9.09 K/UL — HIGH (ref 1.8–7.4)
NEUTROPHILS # BLD AUTO: 9.19 K/UL — HIGH (ref 1.8–7.4)
NEUTROPHILS # BLD AUTO: 9.26 K/UL — HIGH (ref 1.8–7.4)
NEUTROPHILS # BLD AUTO: 9.49 K/UL — HIGH (ref 1.8–7.4)
NEUTROPHILS # BLD AUTO: 9.51 K/UL — HIGH (ref 1.8–7.4)
NEUTROPHILS # BLD AUTO: 9.76 K/UL — HIGH (ref 1.8–7.4)
NEUTROPHILS NFR BLD AUTO: 67.1 % — SIGNIFICANT CHANGE UP (ref 43–77)
NEUTROPHILS NFR BLD AUTO: 67.1 % — SIGNIFICANT CHANGE UP (ref 43–77)
NEUTROPHILS NFR BLD AUTO: 69.3 % — SIGNIFICANT CHANGE UP (ref 43–77)
NEUTROPHILS NFR BLD AUTO: 69.6 % — SIGNIFICANT CHANGE UP (ref 43–77)
NEUTROPHILS NFR BLD AUTO: 71.3 % — SIGNIFICANT CHANGE UP (ref 43–77)
NEUTROPHILS NFR BLD AUTO: 72.9 % — SIGNIFICANT CHANGE UP (ref 43–77)
NEUTROPHILS NFR BLD AUTO: 74.4 % — SIGNIFICANT CHANGE UP (ref 43–77)
NEUTROPHILS NFR BLD AUTO: 74.7 % — SIGNIFICANT CHANGE UP (ref 43–77)
NEUTROPHILS NFR BLD AUTO: 76.3 % — SIGNIFICANT CHANGE UP (ref 43–77)
NEUTROPHILS NFR BLD AUTO: 76.3 % — SIGNIFICANT CHANGE UP (ref 43–77)
NEUTROPHILS NFR BLD AUTO: 76.4 % — SIGNIFICANT CHANGE UP (ref 43–77)
NEUTROPHILS NFR BLD AUTO: 77.2 % — HIGH (ref 43–77)
NEUTROPHILS NFR BLD AUTO: 77.3 % — HIGH (ref 43–77)
NEUTROPHILS NFR BLD AUTO: 77.4 % — HIGH (ref 43–77)
NEUTROPHILS NFR BLD AUTO: 78 % — HIGH (ref 43–77)
NEUTROPHILS NFR BLD AUTO: 78.1 % — HIGH (ref 43–77)
NEUTROPHILS NFR BLD AUTO: 79.5 % — HIGH (ref 43–77)
NEUTROPHILS NFR BLD AUTO: 79.7 % — HIGH (ref 43–77)
NEUTROPHILS NFR BLD AUTO: 80.7 % — HIGH (ref 43–77)
NEUTROPHILS NFR BLD AUTO: 80.9 % — HIGH (ref 43–77)
NEUTROPHILS NFR BLD AUTO: 81.1 % — HIGH (ref 43–77)
NEUTROPHILS NFR BLD AUTO: 81.2 % — HIGH (ref 43–77)
NEUTROPHILS NFR BLD AUTO: 82 % — HIGH (ref 43–77)
NEUTROPHILS NFR BLD AUTO: 82.3 % — HIGH (ref 43–77)
NEUTROPHILS NFR BLD AUTO: 82.4 % — HIGH (ref 43–77)
NEUTROPHILS NFR BLD AUTO: 85.3 % — HIGH (ref 43–77)
NEUTROPHILS NFR BLD AUTO: 85.8 % — HIGH (ref 43–77)
NEUTROPHILS NFR BLD AUTO: 86.8 % — HIGH (ref 43–77)
NEUTROPHILS NFR BLD AUTO: 87.3 % — HIGH (ref 43–77)
NEUTROPHILS NFR BLD AUTO: 87.5 % — HIGH (ref 43–77)
NEUTROPHILS NFR BLD AUTO: 87.6 % — HIGH (ref 43–77)
NEUTROPHILS NFR BLD AUTO: 87.6 % — HIGH (ref 43–77)
NEUTROPHILS NFR BLD AUTO: 87.7 % — HIGH (ref 43–77)
NEUTROPHILS NFR BLD AUTO: 88.1 % — HIGH (ref 43–77)
NEUTROPHILS NFR BLD AUTO: 88.3 % — HIGH (ref 43–77)
NEUTROPHILS NFR BLD AUTO: 88.3 % — HIGH (ref 43–77)
NEUTROPHILS NFR BLD AUTO: 88.5 % — HIGH (ref 43–77)
NEUTROPHILS NFR BLD AUTO: 89.5 % — HIGH (ref 43–77)
NEUTROPHILS NFR BLD AUTO: 89.6 % — HIGH (ref 43–77)
NEUTROPHILS NFR BLD AUTO: 91.3 % — HIGH (ref 43–77)
NEUTROPHILS NFR BLD AUTO: 91.6 % — HIGH (ref 43–77)
NEUTROPHILS NFR BLD AUTO: 92.1 % — HIGH (ref 43–77)
NEUTROPHILS NFR BLD AUTO: 92.2 % — HIGH (ref 43–77)
NEUTROPHILS NFR BLD AUTO: 92.8 % — HIGH (ref 43–77)
NEUTROPHILS NFR BLD AUTO: 92.8 % — HIGH (ref 43–77)
NEUTROPHILS NFR BLD AUTO: 93 % — HIGH (ref 43–77)
NEUTROPHILS NFR BLD AUTO: 93.1 % — HIGH (ref 43–77)
NEUTROPHILS NFR BLD AUTO: 93.1 % — HIGH (ref 43–77)
NEUTROPHILS NFR BLD AUTO: 93.9 % — HIGH (ref 43–77)
NEUTROPHILS NFR BLD AUTO: 94.7 % — HIGH (ref 43–77)
NEUTROPHILS NFR BLD AUTO: 94.8 % — HIGH (ref 43–77)
NEUTROPHILS NFR BLD AUTO: 97.4 % — HIGH (ref 43–77)
NEUTROPHILS-BODY FLUID: 28 % — SIGNIFICANT CHANGE UP
NEUTS BAND # BLD: 0.9 % — SIGNIFICANT CHANGE UP (ref 0–8)
NIGHT BLUE STAIN TISS: SIGNIFICANT CHANGE UP
NITRITE UR-MCNC: NEGATIVE — SIGNIFICANT CHANGE UP
NITRITE UR-MCNC: POSITIVE
NON HDL CHOLESTEROL: 55 MG/DL — SIGNIFICANT CHANGE UP
NON HDL CHOLESTEROL: 69 MG/DL — SIGNIFICANT CHANGE UP
NON HDL CHOLESTEROL: 79 MG/DL — SIGNIFICANT CHANGE UP
NON-GYNECOLOGICAL CYTOLOGY STUDY: SIGNIFICANT CHANGE UP
NRBC # BLD: 0 /100 WBCS — SIGNIFICANT CHANGE UP (ref 0–0)
NRBC # BLD: 1 /100 WBCS — HIGH (ref 0–0)
NRBC # BLD: SIGNIFICANT CHANGE UP /100 WBCS (ref 0–0)
NT-PROBNP SERPL-SCNC: 1231 PG/ML — HIGH (ref 0–300)
NT-PROBNP SERPL-SCNC: 461 PG/ML — HIGH (ref 0–300)
NT-PROBNP SERPL-SCNC: 613 PG/ML — HIGH (ref 0–300)
ORGANISM # SPEC MICROSCOPIC CNT: ABNORMAL
ORGANISM # SPEC MICROSCOPIC CNT: SIGNIFICANT CHANGE UP
ORGANISM # SPEC MICROSCOPIC CNT: SIGNIFICANT CHANGE UP
OSMOLALITY SERPL: 325 MOSM/KG — HIGH (ref 280–301)
OSMOLALITY UR: 392 MOSM/KG — SIGNIFICANT CHANGE UP (ref 300–900)
OSMOLALITY UR: 398 MOSM/KG — SIGNIFICANT CHANGE UP (ref 300–900)
OSMOLALITY UR: 423 MOSM/KG — SIGNIFICANT CHANGE UP (ref 300–900)
OSMOLALITY UR: 430 MOSM/KG — SIGNIFICANT CHANGE UP (ref 300–900)
OSMOLALITY UR: 477 MOSM/KG — SIGNIFICANT CHANGE UP (ref 300–900)
OSMOLALITY UR: 539 MOSM/KG — SIGNIFICANT CHANGE UP (ref 300–900)
OSMOLALITY UR: 569 MOSM/KG — SIGNIFICANT CHANGE UP (ref 300–900)
OSMOLALITY UR: 604 MOSM/KG — SIGNIFICANT CHANGE UP (ref 300–900)
OSMOLALITY UR: 677 MOSM/KG — SIGNIFICANT CHANGE UP (ref 300–900)
OSMOLALITY UR: 837 MOSM/KG — SIGNIFICANT CHANGE UP (ref 300–900)
OVAL FAT BODIES # UR COMP ASSIST: PRESENT
OVALOCYTES BLD QL SMEAR: SIGNIFICANT CHANGE UP
OVALOCYTES BLD QL SMEAR: SLIGHT — SIGNIFICANT CHANGE UP
PCO2 BLDA: 39 MMHG — SIGNIFICANT CHANGE UP (ref 35–48)
PCO2 BLDA: 44 MMHG — SIGNIFICANT CHANGE UP (ref 35–48)
PCO2 BLDA: 47 MMHG — SIGNIFICANT CHANGE UP (ref 35–48)
PCO2 BLDV: 49 MMHG — SIGNIFICANT CHANGE UP (ref 42–55)
PH BLDA: 7.33 — LOW (ref 7.35–7.45)
PH BLDA: 7.51 — HIGH (ref 7.35–7.45)
PH BLDA: 7.53 — HIGH (ref 7.35–7.45)
PH BLDV: 7.35 — SIGNIFICANT CHANGE UP (ref 7.32–7.43)
PH UR: 5 — SIGNIFICANT CHANGE UP (ref 5–8)
PH UR: 5.5 — SIGNIFICANT CHANGE UP (ref 5–8)
PH UR: 6 — SIGNIFICANT CHANGE UP (ref 5–8)
PH UR: 6.5 — SIGNIFICANT CHANGE UP (ref 5–8)
PH UR: 8 — SIGNIFICANT CHANGE UP (ref 5–8)
PH UR: 8 — SIGNIFICANT CHANGE UP (ref 5–8)
PH, PLEURAL FLUID: 7.63 — SIGNIFICANT CHANGE UP
PHOSPHATE SERPL-MCNC: 1.4 MG/DL — LOW (ref 2.5–4.5)
PHOSPHATE SERPL-MCNC: 1.5 MG/DL — LOW (ref 2.5–4.5)
PHOSPHATE SERPL-MCNC: 2.1 MG/DL — LOW (ref 2.5–4.5)
PHOSPHATE SERPL-MCNC: 2.4 MG/DL — LOW (ref 2.5–4.5)
PHOSPHATE SERPL-MCNC: 2.5 MG/DL — SIGNIFICANT CHANGE UP (ref 2.5–4.5)
PHOSPHATE SERPL-MCNC: 2.8 MG/DL — SIGNIFICANT CHANGE UP (ref 2.5–4.5)
PHOSPHATE SERPL-MCNC: 2.9 MG/DL — SIGNIFICANT CHANGE UP (ref 2.5–4.5)
PHOSPHATE SERPL-MCNC: 3 MG/DL — SIGNIFICANT CHANGE UP (ref 2.5–4.5)
PHOSPHATE SERPL-MCNC: 3 MG/DL — SIGNIFICANT CHANGE UP (ref 2.5–4.5)
PHOSPHATE SERPL-MCNC: 3.1 MG/DL — SIGNIFICANT CHANGE UP (ref 2.5–4.5)
PHOSPHATE SERPL-MCNC: 3.2 MG/DL — SIGNIFICANT CHANGE UP (ref 2.5–4.5)
PHOSPHATE SERPL-MCNC: 3.3 MG/DL — SIGNIFICANT CHANGE UP (ref 2.5–4.5)
PHOSPHATE SERPL-MCNC: 3.4 MG/DL — SIGNIFICANT CHANGE UP (ref 2.5–4.5)
PHOSPHATE SERPL-MCNC: 3.5 MG/DL — SIGNIFICANT CHANGE UP (ref 2.5–4.5)
PHOSPHATE SERPL-MCNC: 3.6 MG/DL — SIGNIFICANT CHANGE UP (ref 2.5–4.5)
PHOSPHATE SERPL-MCNC: 3.7 MG/DL — SIGNIFICANT CHANGE UP (ref 2.5–4.5)
PHOSPHATE SERPL-MCNC: 3.8 MG/DL — SIGNIFICANT CHANGE UP (ref 2.5–4.5)
PHOSPHATE SERPL-MCNC: 3.9 MG/DL — SIGNIFICANT CHANGE UP (ref 2.5–4.5)
PHOSPHATE SERPL-MCNC: 4 MG/DL — SIGNIFICANT CHANGE UP (ref 2.5–4.5)
PHOSPHATE SERPL-MCNC: 4.1 MG/DL — SIGNIFICANT CHANGE UP (ref 2.5–4.5)
PHOSPHATE SERPL-MCNC: 4.2 MG/DL — SIGNIFICANT CHANGE UP (ref 2.5–4.5)
PHOSPHATE SERPL-MCNC: 4.2 MG/DL — SIGNIFICANT CHANGE UP (ref 2.5–4.5)
PHOSPHATE SERPL-MCNC: 4.3 MG/DL — SIGNIFICANT CHANGE UP (ref 2.5–4.5)
PHOSPHATE SERPL-MCNC: 4.4 MG/DL — SIGNIFICANT CHANGE UP (ref 2.5–4.5)
PHOSPHATE SERPL-MCNC: 4.6 MG/DL — HIGH (ref 2.5–4.5)
PHOSPHATE SERPL-MCNC: 4.7 MG/DL — HIGH (ref 2.5–4.5)
PHOSPHATE SERPL-MCNC: 4.8 MG/DL — HIGH (ref 2.5–4.5)
PHOSPHATE SERPL-MCNC: 4.8 MG/DL — HIGH (ref 2.5–4.5)
PHOSPHATE SERPL-MCNC: 5 MG/DL — HIGH (ref 2.5–4.5)
PHOSPHATE SERPL-MCNC: 5.1 MG/DL — HIGH (ref 2.5–4.5)
PHOSPHATE SERPL-MCNC: 5.1 MG/DL — HIGH (ref 2.5–4.5)
PHOSPHATE SERPL-MCNC: 5.3 MG/DL — HIGH (ref 2.5–4.5)
PHOSPHATE SERPL-MCNC: 5.4 MG/DL — HIGH (ref 2.5–4.5)
PHOSPHATE SERPL-MCNC: 5.4 MG/DL — HIGH (ref 2.5–4.5)
PHOSPHATE SERPL-MCNC: 5.5 MG/DL — HIGH (ref 2.5–4.5)
PHOSPHATE SERPL-MCNC: 5.5 MG/DL — HIGH (ref 2.5–4.5)
PHOSPHATE SERPL-MCNC: 6.8 MG/DL — HIGH (ref 2.5–4.5)
PLAT MORPH BLD: ABNORMAL
PLAT MORPH BLD: NORMAL — SIGNIFICANT CHANGE UP
PLATELET # BLD AUTO: 101 K/UL — LOW (ref 150–400)
PLATELET # BLD AUTO: 101 K/UL — LOW (ref 150–400)
PLATELET # BLD AUTO: 106 K/UL — LOW (ref 150–400)
PLATELET # BLD AUTO: 107 K/UL — LOW (ref 150–400)
PLATELET # BLD AUTO: 108 K/UL — LOW (ref 150–400)
PLATELET # BLD AUTO: 109 K/UL — LOW (ref 150–400)
PLATELET # BLD AUTO: 111 K/UL — LOW (ref 150–400)
PLATELET # BLD AUTO: 111 K/UL — LOW (ref 150–400)
PLATELET # BLD AUTO: 117 K/UL — LOW (ref 150–400)
PLATELET # BLD AUTO: 124 K/UL — LOW (ref 150–400)
PLATELET # BLD AUTO: 126 K/UL — LOW (ref 150–400)
PLATELET # BLD AUTO: 126 K/UL — LOW (ref 150–400)
PLATELET # BLD AUTO: 128 K/UL — LOW (ref 150–400)
PLATELET # BLD AUTO: 134 K/UL — LOW (ref 150–400)
PLATELET # BLD AUTO: 135 K/UL — LOW (ref 150–400)
PLATELET # BLD AUTO: 136 K/UL — LOW (ref 150–400)
PLATELET # BLD AUTO: 141 K/UL — LOW (ref 150–400)
PLATELET # BLD AUTO: 141 K/UL — LOW (ref 150–400)
PLATELET # BLD AUTO: 142 K/UL — LOW (ref 150–400)
PLATELET # BLD AUTO: 146 K/UL — LOW (ref 150–400)
PLATELET # BLD AUTO: 147 K/UL — LOW (ref 150–400)
PLATELET # BLD AUTO: 147 K/UL — LOW (ref 150–400)
PLATELET # BLD AUTO: 148 K/UL — LOW (ref 150–400)
PLATELET # BLD AUTO: 148 K/UL — LOW (ref 150–400)
PLATELET # BLD AUTO: 150 K/UL — SIGNIFICANT CHANGE UP (ref 150–400)
PLATELET # BLD AUTO: 150 K/UL — SIGNIFICANT CHANGE UP (ref 150–400)
PLATELET # BLD AUTO: 151 K/UL — SIGNIFICANT CHANGE UP (ref 150–400)
PLATELET # BLD AUTO: 152 K/UL — SIGNIFICANT CHANGE UP (ref 150–400)
PLATELET # BLD AUTO: 152 K/UL — SIGNIFICANT CHANGE UP (ref 150–400)
PLATELET # BLD AUTO: 153 K/UL — SIGNIFICANT CHANGE UP (ref 150–400)
PLATELET # BLD AUTO: 156 K/UL — SIGNIFICANT CHANGE UP (ref 150–400)
PLATELET # BLD AUTO: 159 K/UL — SIGNIFICANT CHANGE UP (ref 150–400)
PLATELET # BLD AUTO: 161 K/UL — SIGNIFICANT CHANGE UP (ref 150–400)
PLATELET # BLD AUTO: 162 K/UL — SIGNIFICANT CHANGE UP (ref 150–400)
PLATELET # BLD AUTO: 163 K/UL — SIGNIFICANT CHANGE UP (ref 150–400)
PLATELET # BLD AUTO: 164 K/UL — SIGNIFICANT CHANGE UP (ref 150–400)
PLATELET # BLD AUTO: 165 K/UL — SIGNIFICANT CHANGE UP (ref 150–400)
PLATELET # BLD AUTO: 166 K/UL — SIGNIFICANT CHANGE UP (ref 150–400)
PLATELET # BLD AUTO: 167 K/UL — SIGNIFICANT CHANGE UP (ref 150–400)
PLATELET # BLD AUTO: 167 K/UL — SIGNIFICANT CHANGE UP (ref 150–400)
PLATELET # BLD AUTO: 168 K/UL — SIGNIFICANT CHANGE UP (ref 150–400)
PLATELET # BLD AUTO: 168 K/UL — SIGNIFICANT CHANGE UP (ref 150–400)
PLATELET # BLD AUTO: 170 K/UL — SIGNIFICANT CHANGE UP (ref 150–400)
PLATELET # BLD AUTO: 171 K/UL — SIGNIFICANT CHANGE UP (ref 150–400)
PLATELET # BLD AUTO: 172 K/UL — SIGNIFICANT CHANGE UP (ref 150–400)
PLATELET # BLD AUTO: 173 K/UL — SIGNIFICANT CHANGE UP (ref 150–400)
PLATELET # BLD AUTO: 174 K/UL — SIGNIFICANT CHANGE UP (ref 150–400)
PLATELET # BLD AUTO: 174 K/UL — SIGNIFICANT CHANGE UP (ref 150–400)
PLATELET # BLD AUTO: 175 K/UL — SIGNIFICANT CHANGE UP (ref 150–400)
PLATELET # BLD AUTO: 176 K/UL — SIGNIFICANT CHANGE UP (ref 150–400)
PLATELET # BLD AUTO: 177 K/UL — SIGNIFICANT CHANGE UP (ref 150–400)
PLATELET # BLD AUTO: 178 K/UL — SIGNIFICANT CHANGE UP (ref 150–400)
PLATELET # BLD AUTO: 178 K/UL — SIGNIFICANT CHANGE UP (ref 150–400)
PLATELET # BLD AUTO: 179 K/UL — SIGNIFICANT CHANGE UP (ref 150–400)
PLATELET # BLD AUTO: 181 K/UL — SIGNIFICANT CHANGE UP (ref 150–400)
PLATELET # BLD AUTO: 183 K/UL — SIGNIFICANT CHANGE UP (ref 150–400)
PLATELET # BLD AUTO: 184 K/UL — SIGNIFICANT CHANGE UP (ref 150–400)
PLATELET # BLD AUTO: 186 K/UL — SIGNIFICANT CHANGE UP (ref 150–400)
PLATELET # BLD AUTO: 187 K/UL — SIGNIFICANT CHANGE UP (ref 150–400)
PLATELET # BLD AUTO: 190 K/UL — SIGNIFICANT CHANGE UP (ref 150–400)
PLATELET # BLD AUTO: 192 K/UL — SIGNIFICANT CHANGE UP (ref 150–400)
PLATELET # BLD AUTO: 193 K/UL — SIGNIFICANT CHANGE UP (ref 150–400)
PLATELET # BLD AUTO: 195 K/UL — SIGNIFICANT CHANGE UP (ref 150–400)
PLATELET # BLD AUTO: 197 K/UL — SIGNIFICANT CHANGE UP (ref 150–400)
PLATELET # BLD AUTO: 200 K/UL — SIGNIFICANT CHANGE UP (ref 150–400)
PLATELET # BLD AUTO: 201 K/UL — SIGNIFICANT CHANGE UP (ref 150–400)
PLATELET # BLD AUTO: 203 K/UL — SIGNIFICANT CHANGE UP (ref 150–400)
PLATELET # BLD AUTO: 205 K/UL — SIGNIFICANT CHANGE UP (ref 150–400)
PLATELET # BLD AUTO: 214 K/UL — SIGNIFICANT CHANGE UP (ref 150–400)
PLATELET # BLD AUTO: 218 K/UL — SIGNIFICANT CHANGE UP (ref 150–400)
PLATELET # BLD AUTO: 223 K/UL — SIGNIFICANT CHANGE UP (ref 150–400)
PLATELET # BLD AUTO: 237 K/UL — SIGNIFICANT CHANGE UP (ref 150–400)
PLATELET # BLD AUTO: 241 K/UL — SIGNIFICANT CHANGE UP (ref 150–400)
PLATELET # BLD AUTO: 38 K/UL — LOW (ref 150–400)
PLATELET # BLD AUTO: 41 K/UL — LOW (ref 150–400)
PLATELET # BLD AUTO: 41 K/UL — LOW (ref 150–400)
PLATELET # BLD AUTO: 44 K/UL — LOW (ref 150–400)
PLATELET # BLD AUTO: 45 K/UL — LOW (ref 150–400)
PLATELET # BLD AUTO: 49 K/UL — LOW (ref 150–400)
PLATELET # BLD AUTO: 49 K/UL — LOW (ref 150–400)
PLATELET # BLD AUTO: 51 K/UL — LOW (ref 150–400)
PLATELET # BLD AUTO: 53 K/UL — LOW (ref 150–400)
PLATELET # BLD AUTO: 53 K/UL — LOW (ref 150–400)
PLATELET # BLD AUTO: 55 K/UL — LOW (ref 150–400)
PLATELET # BLD AUTO: 56 K/UL — LOW (ref 150–400)
PLATELET # BLD AUTO: 56 K/UL — LOW (ref 150–400)
PLATELET # BLD AUTO: 58 K/UL — LOW (ref 150–400)
PLATELET # BLD AUTO: 60 K/UL — LOW (ref 150–400)
PLATELET # BLD AUTO: 62 K/UL — LOW (ref 150–400)
PLATELET # BLD AUTO: 64 K/UL — LOW (ref 150–400)
PLATELET # BLD AUTO: 65 K/UL — LOW (ref 150–400)
PLATELET # BLD AUTO: 66 K/UL — LOW (ref 150–400)
PLATELET # BLD AUTO: 68 K/UL — LOW (ref 150–400)
PLATELET # BLD AUTO: 70 K/UL — LOW (ref 150–400)
PLATELET # BLD AUTO: 71 K/UL — LOW (ref 150–400)
PLATELET # BLD AUTO: 72 K/UL — LOW (ref 150–400)
PLATELET # BLD AUTO: 73 K/UL — LOW (ref 150–400)
PLATELET # BLD AUTO: 76 K/UL — LOW (ref 150–400)
PLATELET # BLD AUTO: 77 K/UL — LOW (ref 150–400)
PLATELET # BLD AUTO: 83 K/UL — LOW (ref 150–400)
PLATELET # BLD AUTO: 85 K/UL — LOW (ref 150–400)
PLATELET # BLD AUTO: 86 K/UL — LOW (ref 150–400)
PLATELET # BLD AUTO: 87 K/UL — LOW (ref 150–400)
PLATELET # BLD AUTO: 88 K/UL — LOW (ref 150–400)
PLATELET # BLD AUTO: 89 K/UL — LOW (ref 150–400)
PLATELET # BLD AUTO: 90 K/UL — LOW (ref 150–400)
PLATELET # BLD AUTO: 91 K/UL — LOW (ref 150–400)
PLATELET # BLD AUTO: 97 K/UL — LOW (ref 150–400)
PLATELET # BLD AUTO: 99 K/UL — LOW (ref 150–400)
PO2 BLDA: 129 MMHG — HIGH (ref 83–108)
PO2 BLDA: 134 MMHG — HIGH (ref 83–108)
PO2 BLDA: 90 MMHG — SIGNIFICANT CHANGE UP (ref 83–108)
PO2 BLDV: <33 MMHG — SIGNIFICANT CHANGE UP (ref 25–45)
POIKILOCYTOSIS BLD QL AUTO: SIGNIFICANT CHANGE UP
POIKILOCYTOSIS BLD QL AUTO: SLIGHT — SIGNIFICANT CHANGE UP
POLYCHROMASIA BLD QL SMEAR: SIGNIFICANT CHANGE UP
POLYCHROMASIA BLD QL SMEAR: SIGNIFICANT CHANGE UP
POLYCHROMASIA BLD QL SMEAR: SLIGHT — SIGNIFICANT CHANGE UP
POTASSIUM BLDV-SCNC: 4.5 MMOL/L — SIGNIFICANT CHANGE UP (ref 3.5–5.1)
POTASSIUM SERPL-MCNC: 3 MMOL/L — LOW (ref 3.5–5.3)
POTASSIUM SERPL-MCNC: 3.1 MMOL/L — LOW (ref 3.5–5.3)
POTASSIUM SERPL-MCNC: 3.2 MMOL/L — LOW (ref 3.5–5.3)
POTASSIUM SERPL-MCNC: 3.4 MMOL/L — LOW (ref 3.5–5.3)
POTASSIUM SERPL-MCNC: 3.5 MMOL/L — SIGNIFICANT CHANGE UP (ref 3.5–5.3)
POTASSIUM SERPL-MCNC: 3.6 MMOL/L — SIGNIFICANT CHANGE UP (ref 3.5–5.3)
POTASSIUM SERPL-MCNC: 3.6 MMOL/L — SIGNIFICANT CHANGE UP (ref 3.5–5.3)
POTASSIUM SERPL-MCNC: 3.7 MMOL/L — SIGNIFICANT CHANGE UP (ref 3.5–5.3)
POTASSIUM SERPL-MCNC: 3.8 MMOL/L — SIGNIFICANT CHANGE UP (ref 3.5–5.3)
POTASSIUM SERPL-MCNC: 3.9 MMOL/L — SIGNIFICANT CHANGE UP (ref 3.5–5.3)
POTASSIUM SERPL-MCNC: 4 MMOL/L — SIGNIFICANT CHANGE UP (ref 3.5–5.3)
POTASSIUM SERPL-MCNC: 4.1 MMOL/L — SIGNIFICANT CHANGE UP (ref 3.5–5.3)
POTASSIUM SERPL-MCNC: 4.2 MMOL/L — SIGNIFICANT CHANGE UP (ref 3.5–5.3)
POTASSIUM SERPL-MCNC: 4.3 MMOL/L — SIGNIFICANT CHANGE UP (ref 3.5–5.3)
POTASSIUM SERPL-MCNC: 4.4 MMOL/L — SIGNIFICANT CHANGE UP (ref 3.5–5.3)
POTASSIUM SERPL-MCNC: 4.5 MMOL/L — SIGNIFICANT CHANGE UP (ref 3.5–5.3)
POTASSIUM SERPL-MCNC: 4.6 MMOL/L — SIGNIFICANT CHANGE UP (ref 3.5–5.3)
POTASSIUM SERPL-MCNC: 4.7 MMOL/L — SIGNIFICANT CHANGE UP (ref 3.5–5.3)
POTASSIUM SERPL-MCNC: 4.8 MMOL/L — SIGNIFICANT CHANGE UP (ref 3.5–5.3)
POTASSIUM SERPL-MCNC: 4.9 MMOL/L — SIGNIFICANT CHANGE UP (ref 3.5–5.3)
POTASSIUM SERPL-MCNC: 5.1 MMOL/L — SIGNIFICANT CHANGE UP (ref 3.5–5.3)
POTASSIUM SERPL-MCNC: 5.2 MMOL/L — SIGNIFICANT CHANGE UP (ref 3.5–5.3)
POTASSIUM SERPL-MCNC: 5.4 MMOL/L — HIGH (ref 3.5–5.3)
POTASSIUM SERPL-MCNC: 5.5 MMOL/L — HIGH (ref 3.5–5.3)
POTASSIUM SERPL-MCNC: 5.5 MMOL/L — HIGH (ref 3.5–5.3)
POTASSIUM SERPL-MCNC: 5.6 MMOL/L — HIGH (ref 3.5–5.3)
POTASSIUM SERPL-MCNC: 5.7 MMOL/L — HIGH (ref 3.5–5.3)
POTASSIUM SERPL-MCNC: SIGNIFICANT CHANGE UP (ref 3.5–5.3)
POTASSIUM SERPL-MCNC: SIGNIFICANT CHANGE UP (ref 3.5–5.3)
POTASSIUM SERPL-MCNC: SIGNIFICANT CHANGE UP MMOL/L (ref 3.5–5.3)
POTASSIUM SERPL-SCNC: 3 MMOL/L — LOW (ref 3.5–5.3)
POTASSIUM SERPL-SCNC: 3.1 MMOL/L — LOW (ref 3.5–5.3)
POTASSIUM SERPL-SCNC: 3.2 MMOL/L — LOW (ref 3.5–5.3)
POTASSIUM SERPL-SCNC: 3.4 MMOL/L — LOW (ref 3.5–5.3)
POTASSIUM SERPL-SCNC: 3.5 MMOL/L — SIGNIFICANT CHANGE UP (ref 3.5–5.3)
POTASSIUM SERPL-SCNC: 3.6 MMOL/L — SIGNIFICANT CHANGE UP (ref 3.5–5.3)
POTASSIUM SERPL-SCNC: 3.6 MMOL/L — SIGNIFICANT CHANGE UP (ref 3.5–5.3)
POTASSIUM SERPL-SCNC: 3.7 MMOL/L — SIGNIFICANT CHANGE UP (ref 3.5–5.3)
POTASSIUM SERPL-SCNC: 3.8 MMOL/L — SIGNIFICANT CHANGE UP (ref 3.5–5.3)
POTASSIUM SERPL-SCNC: 3.9 MMOL/L — SIGNIFICANT CHANGE UP (ref 3.5–5.3)
POTASSIUM SERPL-SCNC: 4 MMOL/L — SIGNIFICANT CHANGE UP (ref 3.5–5.3)
POTASSIUM SERPL-SCNC: 4.1 MMOL/L — SIGNIFICANT CHANGE UP (ref 3.5–5.3)
POTASSIUM SERPL-SCNC: 4.2 MMOL/L — SIGNIFICANT CHANGE UP (ref 3.5–5.3)
POTASSIUM SERPL-SCNC: 4.3 MMOL/L — SIGNIFICANT CHANGE UP (ref 3.5–5.3)
POTASSIUM SERPL-SCNC: 4.4 MMOL/L — SIGNIFICANT CHANGE UP (ref 3.5–5.3)
POTASSIUM SERPL-SCNC: 4.5 MMOL/L — SIGNIFICANT CHANGE UP (ref 3.5–5.3)
POTASSIUM SERPL-SCNC: 4.6 MMOL/L — SIGNIFICANT CHANGE UP (ref 3.5–5.3)
POTASSIUM SERPL-SCNC: 4.7 MMOL/L — SIGNIFICANT CHANGE UP (ref 3.5–5.3)
POTASSIUM SERPL-SCNC: 4.8 MMOL/L — SIGNIFICANT CHANGE UP (ref 3.5–5.3)
POTASSIUM SERPL-SCNC: 4.9 MMOL/L — SIGNIFICANT CHANGE UP (ref 3.5–5.3)
POTASSIUM SERPL-SCNC: 5.1 MMOL/L — SIGNIFICANT CHANGE UP (ref 3.5–5.3)
POTASSIUM SERPL-SCNC: 5.2 MMOL/L — SIGNIFICANT CHANGE UP (ref 3.5–5.3)
POTASSIUM SERPL-SCNC: 5.4 MMOL/L — HIGH (ref 3.5–5.3)
POTASSIUM SERPL-SCNC: 5.5 MMOL/L — HIGH (ref 3.5–5.3)
POTASSIUM SERPL-SCNC: 5.5 MMOL/L — HIGH (ref 3.5–5.3)
POTASSIUM SERPL-SCNC: 5.6 MMOL/L — HIGH (ref 3.5–5.3)
POTASSIUM SERPL-SCNC: 5.7 MMOL/L — HIGH (ref 3.5–5.3)
POTASSIUM SERPL-SCNC: SIGNIFICANT CHANGE UP (ref 3.5–5.3)
POTASSIUM SERPL-SCNC: SIGNIFICANT CHANGE UP (ref 3.5–5.3)
POTASSIUM SERPL-SCNC: SIGNIFICANT CHANGE UP MMOL/L (ref 3.5–5.3)
POTASSIUM UR-SCNC: 31 MMOL/L — SIGNIFICANT CHANGE UP
POTASSIUM UR-SCNC: 50 MMOL/L — SIGNIFICANT CHANGE UP
POTASSIUM UR-SCNC: 57 MMOL/L — SIGNIFICANT CHANGE UP
POTASSIUM UR-SCNC: 59 MMOL/L — SIGNIFICANT CHANGE UP
POTASSIUM UR-SCNC: 65 MMOL/L — SIGNIFICANT CHANGE UP
POTASSIUM UR-SCNC: 65 MMOL/L — SIGNIFICANT CHANGE UP
POTASSIUM UR-SCNC: 67 MMOL/L — SIGNIFICANT CHANGE UP
POTASSIUM UR-SCNC: 68 MMOL/L — SIGNIFICANT CHANGE UP
POTASSIUM UR-SCNC: 68 MMOL/L — SIGNIFICANT CHANGE UP
POTASSIUM UR-SCNC: >100 MMOL/L — SIGNIFICANT CHANGE UP
PROCALCITONIN SERPL-MCNC: 0.25 NG/ML — HIGH (ref 0.02–0.1)
PROCALCITONIN SERPL-MCNC: 0.42 NG/ML — HIGH (ref 0.02–0.1)
PROT ?TM UR-MCNC: 10 MG/DL — SIGNIFICANT CHANGE UP (ref 0–12)
PROT ?TM UR-MCNC: 11 MG/DL — SIGNIFICANT CHANGE UP (ref 0–12)
PROT ?TM UR-MCNC: 111 MG/DL — HIGH (ref 0–12)
PROT ?TM UR-MCNC: 124 MG/DL — HIGH (ref 0–12)
PROT ?TM UR-MCNC: 13 MG/DL — HIGH (ref 0–12)
PROT ?TM UR-MCNC: 138 MG/DL — HIGH (ref 0–12)
PROT ?TM UR-MCNC: 156 MG/DL — HIGH (ref 0–12)
PROT ?TM UR-MCNC: 24 MG/DL — HIGH (ref 0–12)
PROT ?TM UR-MCNC: 24 MG/DL — HIGH (ref 0–12)
PROT ?TM UR-MCNC: 39 MG/DL — HIGH (ref 0–12)
PROT ?TM UR-MCNC: 523 MG/DL — SIGNIFICANT CHANGE UP (ref 0–12)
PROT ?TM UR-MCNC: 98 MG/DL — HIGH (ref 0–12)
PROT FLD-MCNC: 0.9 G/DL — SIGNIFICANT CHANGE UP
PROT PATTERN 24H UR ELPH-IMP: SIGNIFICANT CHANGE UP
PROT PATTERN SERPL ELPH-IMP: SIGNIFICANT CHANGE UP
PROT SERPL-MCNC: 3.7 G/DL — LOW (ref 6–8.3)
PROT SERPL-MCNC: 4 G/DL — LOW (ref 6–8.3)
PROT SERPL-MCNC: 4.4 G/DL — LOW (ref 6–8.3)
PROT SERPL-MCNC: 4.6 G/DL — LOW (ref 6–8.3)
PROT SERPL-MCNC: 5 G/DL — LOW (ref 6–8.3)
PROT SERPL-MCNC: 5.2 G/DL — LOW (ref 6–8.3)
PROT SERPL-MCNC: 5.2 G/DL — LOW (ref 6–8.3)
PROT SERPL-MCNC: 5.3 G/DL — LOW (ref 6–8.3)
PROT SERPL-MCNC: 5.4 G/DL — LOW (ref 6–8.3)
PROT SERPL-MCNC: 5.6 G/DL — LOW (ref 6–8.3)
PROT SERPL-MCNC: 5.6 G/DL — LOW (ref 6–8.3)
PROT SERPL-MCNC: 5.7 G/DL — LOW (ref 6–8.3)
PROT SERPL-MCNC: 5.7 G/DL — LOW (ref 6–8.3)
PROT SERPL-MCNC: 5.8 G/DL — LOW (ref 6–8.3)
PROT SERPL-MCNC: 5.9 G/DL — LOW (ref 6–8.3)
PROT SERPL-MCNC: 6 G/DL — SIGNIFICANT CHANGE UP (ref 6–8.3)
PROT SERPL-MCNC: 6 G/DL — SIGNIFICANT CHANGE UP (ref 6–8.3)
PROT SERPL-MCNC: 6.1 G/DL — SIGNIFICANT CHANGE UP (ref 6–8.3)
PROT SERPL-MCNC: 6.2 G/DL — SIGNIFICANT CHANGE UP (ref 6–8.3)
PROT SERPL-MCNC: 6.3 G/DL — SIGNIFICANT CHANGE UP (ref 6–8.3)
PROT SERPL-MCNC: 6.3 G/DL — SIGNIFICANT CHANGE UP (ref 6–8.3)
PROT SERPL-MCNC: 6.4 G/DL — SIGNIFICANT CHANGE UP (ref 6–8.3)
PROT SERPL-MCNC: 6.4 G/DL — SIGNIFICANT CHANGE UP (ref 6–8.3)
PROT SERPL-MCNC: 6.5 G/DL — SIGNIFICANT CHANGE UP (ref 6–8.3)
PROT SERPL-MCNC: 6.6 G/DL — SIGNIFICANT CHANGE UP (ref 6–8.3)
PROT SERPL-MCNC: 6.7 G/DL — SIGNIFICANT CHANGE UP (ref 6–8.3)
PROT SERPL-MCNC: 6.8 G/DL — SIGNIFICANT CHANGE UP (ref 6–8.3)
PROT SERPL-MCNC: 6.9 G/DL — SIGNIFICANT CHANGE UP (ref 6–8.3)
PROT SERPL-MCNC: 6.9 G/DL — SIGNIFICANT CHANGE UP (ref 6–8.3)
PROT SERPL-MCNC: 7 G/DL — SIGNIFICANT CHANGE UP (ref 6–8.3)
PROT SERPL-MCNC: 7.1 G/DL — SIGNIFICANT CHANGE UP (ref 6–8.3)
PROT SERPL-MCNC: 7.1 G/DL — SIGNIFICANT CHANGE UP (ref 6–8.3)
PROT SERPL-MCNC: 7.2 G/DL — SIGNIFICANT CHANGE UP (ref 6–8.3)
PROT SERPL-MCNC: 7.3 G/DL — SIGNIFICANT CHANGE UP (ref 6–8.3)
PROT SERPL-MCNC: 7.4 G/DL — SIGNIFICANT CHANGE UP (ref 6–8.3)
PROT SERPL-MCNC: 7.4 G/DL — SIGNIFICANT CHANGE UP (ref 6–8.3)
PROT SERPL-MCNC: 7.5 G/DL — SIGNIFICANT CHANGE UP (ref 6–8.3)
PROT SERPL-MCNC: 7.7 G/DL — SIGNIFICANT CHANGE UP (ref 6–8.3)
PROT SERPL-MCNC: 7.8 G/DL — SIGNIFICANT CHANGE UP (ref 6–8.3)
PROT SERPL-MCNC: 7.9 G/DL — SIGNIFICANT CHANGE UP (ref 6–8.3)
PROT SERPL-MCNC: 8 G/DL — SIGNIFICANT CHANGE UP (ref 6–8.3)
PROT SERPL-MCNC: 8.1 G/DL — SIGNIFICANT CHANGE UP (ref 6–8.3)
PROT SERPL-MCNC: 8.2 G/DL — SIGNIFICANT CHANGE UP (ref 6–8.3)
PROT SERPL-MCNC: 8.3 G/DL — SIGNIFICANT CHANGE UP (ref 6–8.3)
PROT SERPL-MCNC: 8.4 G/DL — HIGH (ref 6–8.3)
PROT SERPL-MCNC: 8.5 G/DL — HIGH (ref 6–8.3)
PROT SERPL-MCNC: 8.6 G/DL — HIGH (ref 6–8.3)
PROT SERPL-MCNC: 8.7 G/DL — HIGH (ref 6–8.3)
PROT SERPL-MCNC: 9.1 G/DL — HIGH (ref 6–8.3)
PROT UR-MCNC: 100 MG/DL
PROT UR-MCNC: 30 MG/DL
PROT UR-MCNC: 300 MG/DL
PROT UR-MCNC: NEGATIVE MG/DL — SIGNIFICANT CHANGE UP
PROT UR-MCNC: SIGNIFICANT CHANGE UP MG/DL
PROT UR-MCNC: SIGNIFICANT CHANGE UP MG/DL
PROT/CREAT UR-RTO: 0.1 RATIO — SIGNIFICANT CHANGE UP (ref 0–0.2)
PROT/CREAT UR-RTO: 0.1 RATIO — SIGNIFICANT CHANGE UP (ref 0–0.2)
PROT/CREAT UR-RTO: 0.2 RATIO — SIGNIFICANT CHANGE UP (ref 0–0.2)
PROT/CREAT UR-RTO: 0.3 RATIO — HIGH (ref 0–0.2)
PROT/CREAT UR-RTO: 1.7 RATIO — HIGH (ref 0–0.2)
PROT/CREAT UR-RTO: 1.7 RATIO — HIGH (ref 0–0.2)
PROT/CREAT UR-RTO: 1.8 RATIO — HIGH (ref 0–0.2)
PROT/CREAT UR-RTO: 2.1 RATIO — HIGH (ref 0–0.2)
PROT/CREAT UR-RTO: 2.2 RATIO — HIGH (ref 0–0.2)
PROT/CREAT UR-RTO: 2.3 RATIO — HIGH (ref 0–0.2)
PROTHROM AB SERPL-ACNC: 13.7 SEC — HIGH (ref 9.5–13)
PROTHROM AB SERPL-ACNC: 14.1 SEC — HIGH (ref 9.5–13)
PROTHROM AB SERPL-ACNC: 14.2 SEC — HIGH (ref 9.5–13)
PROTHROM AB SERPL-ACNC: 14.3 SEC — HIGH (ref 9.5–13)
PROTHROM AB SERPL-ACNC: 14.3 SEC — HIGH (ref 9.5–13)
PROTHROM AB SERPL-ACNC: 14.5 SEC — HIGH (ref 9.5–13)
PROTHROM AB SERPL-ACNC: 14.6 SEC — HIGH (ref 9.5–13)
PROTHROM AB SERPL-ACNC: 14.7 SEC — HIGH (ref 9.5–13)
PROTHROM AB SERPL-ACNC: 14.8 SEC — HIGH (ref 9.5–13)
PROTHROM AB SERPL-ACNC: 15 SEC — HIGH (ref 9.5–13)
PROTHROM AB SERPL-ACNC: 15.1 SEC — HIGH (ref 9.5–13)
PROTHROM AB SERPL-ACNC: 15.3 SEC — HIGH (ref 9.5–13)
PROTHROM AB SERPL-ACNC: 15.3 SEC — HIGH (ref 9.5–13)
PROTHROM AB SERPL-ACNC: 15.5 SEC — HIGH (ref 9.5–13)
PROTHROM AB SERPL-ACNC: 15.7 SEC — HIGH (ref 9.5–13)
PROTHROM AB SERPL-ACNC: 15.8 SEC — HIGH (ref 9.5–13)
PROTHROM AB SERPL-ACNC: 15.9 SEC — HIGH (ref 9.5–13)
PROTHROM AB SERPL-ACNC: 16.1 SEC — HIGH (ref 9.5–13)
PROTHROM AB SERPL-ACNC: 16.2 SEC — HIGH (ref 9.5–13)
PROTHROM AB SERPL-ACNC: 16.3 SEC — HIGH (ref 9.5–13)
PROTHROM AB SERPL-ACNC: 16.5 SEC — HIGH (ref 9.5–13)
PROTHROM AB SERPL-ACNC: 16.9 SEC — HIGH (ref 9.5–13)
PROTHROM AB SERPL-ACNC: 17 SEC — HIGH (ref 9.5–13)
PROTHROM AB SERPL-ACNC: 17.1 SEC — HIGH (ref 9.5–13)
PROTHROM AB SERPL-ACNC: 17.2 SEC — HIGH (ref 9.5–13)
PROTHROM AB SERPL-ACNC: 17.4 SEC — HIGH (ref 9.5–13)
PROTHROM AB SERPL-ACNC: 17.5 SEC — HIGH (ref 9.5–13)
PROTHROM AB SERPL-ACNC: 17.5 SEC — HIGH (ref 9.5–13)
PROTHROM AB SERPL-ACNC: 17.7 SEC — HIGH (ref 9.5–13)
PROTHROM AB SERPL-ACNC: 17.7 SEC — HIGH (ref 9.5–13)
PROTHROM AB SERPL-ACNC: 17.8 SEC — HIGH (ref 9.5–13)
PROTHROM AB SERPL-ACNC: 18 SEC — HIGH (ref 9.5–13)
PROTHROM AB SERPL-ACNC: 18.3 SEC — HIGH (ref 9.5–13)
PROTHROM AB SERPL-ACNC: 18.7 SEC — HIGH (ref 9.5–13)
PROTHROM AB SERPL-ACNC: 19.3 SEC — HIGH (ref 9.5–13)
PROTHROM AB SERPL-ACNC: 21.1 SEC — HIGH (ref 9.5–13)
PROTHROM AB SERPL-ACNC: 22.6 SEC — HIGH (ref 9.5–13)
PT 50/50: 11.9 SECS — SIGNIFICANT CHANGE UP (ref 9.5–14)
QUANT TB PLUS MITOGEN MINUS NIL: 0 IU/ML — SIGNIFICANT CHANGE UP
RAPID RVP RESULT: DETECTED
RAPID RVP RESULT: SIGNIFICANT CHANGE UP
RBC # BLD: 1.58 M/UL — LOW (ref 4.2–5.8)
RBC # BLD: 1.83 M/UL — LOW (ref 4.2–5.8)
RBC # BLD: 1.93 M/UL — LOW (ref 4.2–5.8)
RBC # BLD: 2.03 M/UL — LOW (ref 4.2–5.8)
RBC # BLD: 2.03 M/UL — LOW (ref 4.2–5.8)
RBC # BLD: 2.05 M/UL — LOW (ref 4.2–5.8)
RBC # BLD: 2.1 M/UL — LOW (ref 4.2–5.8)
RBC # BLD: 2.1 M/UL — LOW (ref 4.2–5.8)
RBC # BLD: 2.15 M/UL — LOW (ref 4.2–5.8)
RBC # BLD: 2.17 M/UL — LOW (ref 4.2–5.8)
RBC # BLD: 2.22 M/UL — LOW (ref 4.2–5.8)
RBC # BLD: 2.23 M/UL — LOW (ref 4.2–5.8)
RBC # BLD: 2.25 M/UL — LOW (ref 4.2–5.8)
RBC # BLD: 2.26 M/UL — LOW (ref 4.2–5.8)
RBC # BLD: 2.26 M/UL — LOW (ref 4.2–5.8)
RBC # BLD: 2.31 M/UL — LOW (ref 4.2–5.8)
RBC # BLD: 2.31 M/UL — LOW (ref 4.2–5.8)
RBC # BLD: 2.32 M/UL — LOW (ref 4.2–5.8)
RBC # BLD: 2.33 M/UL — LOW (ref 4.2–5.8)
RBC # BLD: 2.34 M/UL — LOW (ref 4.2–5.8)
RBC # BLD: 2.34 M/UL — LOW (ref 4.2–5.8)
RBC # BLD: 2.35 M/UL — LOW (ref 4.2–5.8)
RBC # BLD: 2.37 M/UL — LOW (ref 4.2–5.8)
RBC # BLD: 2.38 M/UL — LOW (ref 4.2–5.8)
RBC # BLD: 2.39 M/UL — LOW (ref 4.2–5.8)
RBC # BLD: 2.4 M/UL — LOW (ref 4.2–5.8)
RBC # BLD: 2.4 M/UL — LOW (ref 4.2–5.8)
RBC # BLD: 2.41 M/UL — LOW (ref 4.2–5.8)
RBC # BLD: 2.41 M/UL — LOW (ref 4.2–5.8)
RBC # BLD: 2.43 M/UL — LOW (ref 4.2–5.8)
RBC # BLD: 2.44 M/UL — LOW (ref 4.2–5.8)
RBC # BLD: 2.45 M/UL — LOW (ref 4.2–5.8)
RBC # BLD: 2.46 M/UL — LOW (ref 4.2–5.8)
RBC # BLD: 2.47 M/UL — LOW (ref 4.2–5.8)
RBC # BLD: 2.47 M/UL — LOW (ref 4.2–5.8)
RBC # BLD: 2.48 M/UL — LOW (ref 4.2–5.8)
RBC # BLD: 2.48 M/UL — LOW (ref 4.2–5.8)
RBC # BLD: 2.49 M/UL — LOW (ref 4.2–5.8)
RBC # BLD: 2.5 M/UL — LOW (ref 4.2–5.8)
RBC # BLD: 2.51 M/UL — LOW (ref 4.2–5.8)
RBC # BLD: 2.51 M/UL — LOW (ref 4.2–5.8)
RBC # BLD: 2.52 M/UL — LOW (ref 4.2–5.8)
RBC # BLD: 2.53 M/UL — LOW (ref 4.2–5.8)
RBC # BLD: 2.54 M/UL — LOW (ref 4.2–5.8)
RBC # BLD: 2.58 M/UL — LOW (ref 4.2–5.8)
RBC # BLD: 2.58 M/UL — LOW (ref 4.2–5.8)
RBC # BLD: 2.59 M/UL — LOW (ref 4.2–5.8)
RBC # BLD: 2.61 M/UL — LOW (ref 4.2–5.8)
RBC # BLD: 2.62 M/UL — LOW (ref 4.2–5.8)
RBC # BLD: 2.63 M/UL — LOW (ref 4.2–5.8)
RBC # BLD: 2.64 M/UL — LOW (ref 4.2–5.8)
RBC # BLD: 2.65 M/UL — LOW (ref 4.2–5.8)
RBC # BLD: 2.66 M/UL — LOW (ref 4.2–5.8)
RBC # BLD: 2.67 M/UL — LOW (ref 4.2–5.8)
RBC # BLD: 2.69 M/UL — LOW (ref 4.2–5.8)
RBC # BLD: 2.7 M/UL — LOW (ref 4.2–5.8)
RBC # BLD: 2.71 M/UL — LOW (ref 4.2–5.8)
RBC # BLD: 2.72 M/UL — LOW (ref 4.2–5.8)
RBC # BLD: 2.73 M/UL — LOW (ref 4.2–5.8)
RBC # BLD: 2.74 M/UL — LOW (ref 4.2–5.8)
RBC # BLD: 2.75 M/UL — LOW (ref 4.2–5.8)
RBC # BLD: 2.76 M/UL — LOW (ref 4.2–5.8)
RBC # BLD: 2.76 M/UL — LOW (ref 4.2–5.8)
RBC # BLD: 2.77 M/UL — LOW (ref 4.2–5.8)
RBC # BLD: 2.78 M/UL — LOW (ref 4.2–5.8)
RBC # BLD: 2.8 M/UL — LOW (ref 4.2–5.8)
RBC # BLD: 2.81 M/UL — LOW (ref 4.2–5.8)
RBC # BLD: 2.82 M/UL — LOW (ref 4.2–5.8)
RBC # BLD: 2.83 M/UL — LOW (ref 4.2–5.8)
RBC # BLD: 2.83 M/UL — LOW (ref 4.2–5.8)
RBC # BLD: 2.84 M/UL — LOW (ref 4.2–5.8)
RBC # BLD: 2.85 M/UL — LOW (ref 4.2–5.8)
RBC # BLD: 2.85 M/UL — LOW (ref 4.2–5.8)
RBC # BLD: 2.89 M/UL — LOW (ref 4.2–5.8)
RBC # BLD: 2.9 M/UL — LOW (ref 4.2–5.8)
RBC # BLD: 2.9 M/UL — LOW (ref 4.2–5.8)
RBC # BLD: 2.91 M/UL — LOW (ref 4.2–5.8)
RBC # BLD: 2.92 M/UL — LOW (ref 4.2–5.8)
RBC # BLD: 2.92 M/UL — LOW (ref 4.2–5.8)
RBC # BLD: 2.93 M/UL — LOW (ref 4.2–5.8)
RBC # BLD: 2.94 M/UL — LOW (ref 4.2–5.8)
RBC # BLD: 2.95 M/UL — LOW (ref 4.2–5.8)
RBC # BLD: 2.96 M/UL — LOW (ref 4.2–5.8)
RBC # BLD: 2.96 M/UL — LOW (ref 4.2–5.8)
RBC # BLD: 2.97 M/UL — LOW (ref 4.2–5.8)
RBC # BLD: 3 M/UL — LOW (ref 4.2–5.8)
RBC # BLD: 3.01 M/UL — LOW (ref 4.2–5.8)
RBC # BLD: 3.01 M/UL — LOW (ref 4.2–5.8)
RBC # BLD: 3.02 M/UL — LOW (ref 4.2–5.8)
RBC # BLD: 3.02 M/UL — LOW (ref 4.2–5.8)
RBC # BLD: 3.04 M/UL — LOW (ref 4.2–5.8)
RBC # BLD: 3.04 M/UL — LOW (ref 4.2–5.8)
RBC # BLD: 3.09 M/UL — LOW (ref 4.2–5.8)
RBC # BLD: 3.11 M/UL — LOW (ref 4.2–5.8)
RBC # BLD: 3.14 M/UL — LOW (ref 4.2–5.8)
RBC # BLD: 3.18 M/UL — LOW (ref 4.2–5.8)
RBC # BLD: 3.23 M/UL — LOW (ref 4.2–5.8)
RBC # BLD: 3.29 M/UL — LOW (ref 4.2–5.8)
RBC # FLD: 14.9 % — HIGH (ref 10.3–14.5)
RBC # FLD: 14.9 % — HIGH (ref 10.3–14.5)
RBC # FLD: 15 % — HIGH (ref 10.3–14.5)
RBC # FLD: 15.1 % — HIGH (ref 10.3–14.5)
RBC # FLD: 15.1 % — HIGH (ref 10.3–14.5)
RBC # FLD: 15.2 % — HIGH (ref 10.3–14.5)
RBC # FLD: 15.2 % — HIGH (ref 10.3–14.5)
RBC # FLD: 15.3 % — HIGH (ref 10.3–14.5)
RBC # FLD: 15.6 % — HIGH (ref 10.3–14.5)
RBC # FLD: 15.9 % — HIGH (ref 10.3–14.5)
RBC # FLD: 16 % — HIGH (ref 10.3–14.5)
RBC # FLD: 16.1 % — HIGH (ref 10.3–14.5)
RBC # FLD: 16.2 % — HIGH (ref 10.3–14.5)
RBC # FLD: 16.3 % — HIGH (ref 10.3–14.5)
RBC # FLD: 16.3 % — HIGH (ref 10.3–14.5)
RBC # FLD: 16.4 % — HIGH (ref 10.3–14.5)
RBC # FLD: 16.5 % — HIGH (ref 10.3–14.5)
RBC # FLD: 16.6 % — HIGH (ref 10.3–14.5)
RBC # FLD: 16.7 % — HIGH (ref 10.3–14.5)
RBC # FLD: 16.7 % — HIGH (ref 10.3–14.5)
RBC # FLD: 16.8 % — HIGH (ref 10.3–14.5)
RBC # FLD: 16.9 % — HIGH (ref 10.3–14.5)
RBC # FLD: 17 % — HIGH (ref 10.3–14.5)
RBC # FLD: 17.1 % — HIGH (ref 10.3–14.5)
RBC # FLD: 17.2 % — HIGH (ref 10.3–14.5)
RBC # FLD: 17.3 % — HIGH (ref 10.3–14.5)
RBC # FLD: 17.4 % — HIGH (ref 10.3–14.5)
RBC # FLD: 17.5 % — HIGH (ref 10.3–14.5)
RBC # FLD: 17.5 % — HIGH (ref 10.3–14.5)
RBC # FLD: 17.6 % — HIGH (ref 10.3–14.5)
RBC # FLD: 17.7 % — HIGH (ref 10.3–14.5)
RBC # FLD: 17.8 % — HIGH (ref 10.3–14.5)
RBC # FLD: 17.9 % — HIGH (ref 10.3–14.5)
RBC # FLD: 18 % — HIGH (ref 10.3–14.5)
RBC # FLD: 18 % — HIGH (ref 10.3–14.5)
RBC # FLD: 18.1 % — HIGH (ref 10.3–14.5)
RBC # FLD: 18.2 % — HIGH (ref 10.3–14.5)
RBC # FLD: 18.3 % — HIGH (ref 10.3–14.5)
RBC # FLD: 18.5 % — HIGH (ref 10.3–14.5)
RBC # FLD: 18.5 % — HIGH (ref 10.3–14.5)
RBC # FLD: 18.6 % — HIGH (ref 10.3–14.5)
RBC # FLD: 18.7 % — HIGH (ref 10.3–14.5)
RBC # FLD: 18.9 % — HIGH (ref 10.3–14.5)
RBC # FLD: 18.9 % — HIGH (ref 10.3–14.5)
RBC # FLD: 19.1 % — HIGH (ref 10.3–14.5)
RBC # FLD: 19.2 % — HIGH (ref 10.3–14.5)
RBC # FLD: 19.2 % — HIGH (ref 10.3–14.5)
RBC # FLD: 19.3 % — HIGH (ref 10.3–14.5)
RBC # FLD: 19.3 % — HIGH (ref 10.3–14.5)
RBC # FLD: 19.4 % — HIGH (ref 10.3–14.5)
RBC # FLD: 19.5 % — HIGH (ref 10.3–14.5)
RBC # FLD: 19.5 % — HIGH (ref 10.3–14.5)
RBC # FLD: 19.7 % — HIGH (ref 10.3–14.5)
RBC # FLD: 20 % — HIGH (ref 10.3–14.5)
RBC # FLD: 20.1 % — HIGH (ref 10.3–14.5)
RBC # FLD: 20.2 % — HIGH (ref 10.3–14.5)
RBC # FLD: 20.4 % — HIGH (ref 10.3–14.5)
RBC # FLD: 20.5 % — HIGH (ref 10.3–14.5)
RBC # FLD: 20.5 % — HIGH (ref 10.3–14.5)
RBC # FLD: 20.6 % — HIGH (ref 10.3–14.5)
RBC # FLD: 20.6 % — HIGH (ref 10.3–14.5)
RBC BLD AUTO: ABNORMAL
RBC CASTS # UR COMP ASSIST: 13 /HPF — HIGH (ref 0–4)
RBC CASTS # UR COMP ASSIST: 2 /HPF — SIGNIFICANT CHANGE UP (ref 0–4)
RBC CASTS # UR COMP ASSIST: 21 /HPF — HIGH (ref 0–4)
RBC CASTS # UR COMP ASSIST: 3 /HPF — SIGNIFICANT CHANGE UP (ref 0–4)
RBC CASTS # UR COMP ASSIST: 33 /HPF — HIGH (ref 0–4)
RBC CASTS # UR COMP ASSIST: 4 /HPF — SIGNIFICANT CHANGE UP (ref 0–4)
RBC CASTS # UR COMP ASSIST: 49 /HPF — HIGH (ref 0–4)
RBC CASTS # UR COMP ASSIST: 5 /HPF — HIGH (ref 0–4)
RBC CASTS # UR COMP ASSIST: 5 /HPF — HIGH (ref 0–4)
RBC CASTS # UR COMP ASSIST: 7 /HPF — HIGH (ref 0–4)
RCV VOL RI: HIGH /UL (ref 0–0)
RETICS #: 86.9 K/UL — SIGNIFICANT CHANGE UP (ref 25–125)
RETICS #: 87.7 K/UL — SIGNIFICANT CHANGE UP (ref 25–125)
RETICS/RBC NFR: 3.6 % — HIGH (ref 0.5–2.5)
RETICS/RBC NFR: 4.3 % — HIGH (ref 0.5–2.5)
RH IG SCN BLD-IMP: POSITIVE — SIGNIFICANT CHANGE UP
ROULEAUX BLD QL SMEAR: PRESENT
S AUREUS DNA NOSE QL NAA+PROBE: POSITIVE
S AUREUS DNA NOSE QL NAA+PROBE: POSITIVE
S PNEUM AG UR QL: NEGATIVE — SIGNIFICANT CHANGE UP
SAO2 % BLDA: 98.8 % — HIGH (ref 94–98)
SAO2 % BLDA: 99.2 % — HIGH (ref 94–98)
SAO2 % BLDA: 99.7 % — HIGH (ref 94–98)
SAO2 % BLDV: 31.6 % — LOW (ref 67–88)
SARS-COV-2 RNA SPEC QL NAA+PROBE: DETECTED
SARS-COV-2 RNA SPEC QL NAA+PROBE: SIGNIFICANT CHANGE UP
SCHISTOCYTES BLD QL AUTO: SLIGHT — SIGNIFICANT CHANGE UP
SELENIUM SERPL-MCNC: 80 UG/L — LOW (ref 93–198)
SELENIUM SERPL-MCNC: 85 UG/L — LOW (ref 93–198)
SELENIUM SERPL-MCNC: 85 UG/L — LOW (ref 93–198)
SMUDGE CELLS # BLD: PRESENT — SIGNIFICANT CHANGE UP
SODIUM SERPL-SCNC: 129 MMOL/L — LOW (ref 135–145)
SODIUM SERPL-SCNC: 129 MMOL/L — LOW (ref 135–145)
SODIUM SERPL-SCNC: 130 MMOL/L — LOW (ref 135–145)
SODIUM SERPL-SCNC: 131 MMOL/L — LOW (ref 135–145)
SODIUM SERPL-SCNC: 131 MMOL/L — LOW (ref 135–145)
SODIUM SERPL-SCNC: 132 MMOL/L — LOW (ref 135–145)
SODIUM SERPL-SCNC: 133 MMOL/L — LOW (ref 135–145)
SODIUM SERPL-SCNC: 134 MMOL/L — LOW (ref 135–145)
SODIUM SERPL-SCNC: 135 MMOL/L — SIGNIFICANT CHANGE UP (ref 135–145)
SODIUM SERPL-SCNC: 136 MMOL/L — SIGNIFICANT CHANGE UP (ref 135–145)
SODIUM SERPL-SCNC: 137 MMOL/L — SIGNIFICANT CHANGE UP (ref 135–145)
SODIUM SERPL-SCNC: 138 MMOL/L — SIGNIFICANT CHANGE UP (ref 135–145)
SODIUM SERPL-SCNC: 139 MMOL/L — SIGNIFICANT CHANGE UP (ref 135–145)
SODIUM SERPL-SCNC: 140 MMOL/L — SIGNIFICANT CHANGE UP (ref 135–145)
SODIUM SERPL-SCNC: 141 MMOL/L — SIGNIFICANT CHANGE UP (ref 135–145)
SODIUM SERPL-SCNC: 141 MMOL/L — SIGNIFICANT CHANGE UP (ref 135–145)
SODIUM SERPL-SCNC: 142 MMOL/L — SIGNIFICANT CHANGE UP (ref 135–145)
SODIUM SERPL-SCNC: 143 MMOL/L — SIGNIFICANT CHANGE UP (ref 135–145)
SODIUM SERPL-SCNC: 143 MMOL/L — SIGNIFICANT CHANGE UP (ref 135–145)
SODIUM SERPL-SCNC: 144 MMOL/L — SIGNIFICANT CHANGE UP (ref 135–145)
SODIUM SERPL-SCNC: 145 MMOL/L — SIGNIFICANT CHANGE UP (ref 135–145)
SODIUM UR-SCNC: 20 MMOL/L — SIGNIFICANT CHANGE UP
SODIUM UR-SCNC: 36 MMOL/L — SIGNIFICANT CHANGE UP
SODIUM UR-SCNC: 67 MMOL/L — SIGNIFICANT CHANGE UP
SODIUM UR-SCNC: <20 MMOL/L — SIGNIFICANT CHANGE UP
SP GR SPEC: 1.02 — SIGNIFICANT CHANGE UP (ref 1–1.03)
SP GR SPEC: 1.03 — SIGNIFICANT CHANGE UP (ref 1–1.03)
SP GR SPEC: >1.03 — HIGH (ref 1–1.03)
SPECIMEN SOURCE FLD: SIGNIFICANT CHANGE UP
SPECIMEN SOURCE: SIGNIFICANT CHANGE UP
SPHEROCYTES BLD QL SMEAR: SLIGHT — SIGNIFICANT CHANGE UP
SQUAMOUS # UR AUTO: 0 /HPF — SIGNIFICANT CHANGE UP (ref 0–5)
SQUAMOUS # UR AUTO: 1 /HPF — SIGNIFICANT CHANGE UP (ref 0–5)
SQUAMOUS # UR AUTO: 2 /HPF — SIGNIFICANT CHANGE UP (ref 0–5)
SQUAMOUS # UR AUTO: 2 /HPF — SIGNIFICANT CHANGE UP (ref 0–5)
SQUAMOUS # UR AUTO: 3 /HPF — SIGNIFICANT CHANGE UP (ref 0–5)
SQUAMOUS # UR AUTO: 4 /HPF — SIGNIFICANT CHANGE UP (ref 0–5)
SQUAMOUS # UR AUTO: 4 /HPF — SIGNIFICANT CHANGE UP (ref 0–5)
SQUAMOUS # UR AUTO: 5 /HPF — SIGNIFICANT CHANGE UP (ref 0–5)
SQUAMOUS # UR AUTO: 7 /HPF — HIGH (ref 0–5)
SQUAMOUS # UR AUTO: 8 /HPF — HIGH (ref 0–5)
STOMATOCYTES BLD QL SMEAR: SIGNIFICANT CHANGE UP
T3 SERPL-MCNC: 103 NG/DL — SIGNIFICANT CHANGE UP (ref 80–200)
T3 SERPL-MCNC: 90 NG/DL — SIGNIFICANT CHANGE UP (ref 80–200)
T3 SERPL-MCNC: 90 NG/DL — SIGNIFICANT CHANGE UP (ref 80–200)
T4 AB SER-ACNC: 10.14 UG/DL — SIGNIFICANT CHANGE UP (ref 4.5–11.7)
T4 AB SER-ACNC: 10.14 UG/DL — SIGNIFICANT CHANGE UP (ref 4.5–11.7)
T4 FREE SERPL-MCNC: 1.21 NG/DL — SIGNIFICANT CHANGE UP (ref 0.93–1.7)
T4 FREE SERPL-MCNC: 1.35 NG/DL — SIGNIFICANT CHANGE UP (ref 0.93–1.7)
TARGETS BLD QL SMEAR: SIGNIFICANT CHANGE UP
TARGETS BLD QL SMEAR: SIGNIFICANT CHANGE UP
TARGETS BLD QL SMEAR: SLIGHT — SIGNIFICANT CHANGE UP
THROMBIN TIME: 32.3 SEC — HIGH (ref 17.6–24)
TIBC SERPL-MCNC: 170 UG/DL — LOW (ref 220–430)
TIBC SERPL-MCNC: 174 UG/DL — LOW (ref 220–430)
TIBC SERPL-MCNC: 206 UG/DL — LOW (ref 220–430)
TIBC SERPL-MCNC: 265 UG/DL — SIGNIFICANT CHANGE UP (ref 220–430)
TOTAL NUCLEATED CELL COUNT, BODY FLUID: 149 /UL — SIGNIFICANT CHANGE UP
TRANSFERRIN SERPL-MCNC: 160 MG/DL — LOW (ref 200–360)
TRANSFERRIN SERPL-MCNC: 189 MG/DL — LOW (ref 200–360)
TRIGL FLD-MCNC: <10 MG/DL — SIGNIFICANT CHANGE UP
TRIGL SERPL-MCNC: 108 MG/DL — SIGNIFICANT CHANGE UP
TRIGL SERPL-MCNC: 119 MG/DL — SIGNIFICANT CHANGE UP
TRIGL SERPL-MCNC: 121 MG/DL — SIGNIFICANT CHANGE UP
TRIGL SERPL-MCNC: 126 MG/DL — SIGNIFICANT CHANGE UP
TRIGL SERPL-MCNC: 127 MG/DL — SIGNIFICANT CHANGE UP
TRIGL SERPL-MCNC: 129 MG/DL — SIGNIFICANT CHANGE UP
TRIGL SERPL-MCNC: 136 MG/DL — SIGNIFICANT CHANGE UP
TRIGL SERPL-MCNC: 136 MG/DL — SIGNIFICANT CHANGE UP
TRIGL SERPL-MCNC: 137 MG/DL — SIGNIFICANT CHANGE UP
TRIGL SERPL-MCNC: 139 MG/DL — SIGNIFICANT CHANGE UP
TRIGL SERPL-MCNC: 139 MG/DL — SIGNIFICANT CHANGE UP
TRIGL SERPL-MCNC: 150 MG/DL — HIGH
TRIGL SERPL-MCNC: 153 MG/DL — HIGH
TRIGL SERPL-MCNC: 153 MG/DL — HIGH
TRIGL SERPL-MCNC: 155 MG/DL — HIGH
TRIGL SERPL-MCNC: 166 MG/DL — HIGH
TRIGL SERPL-MCNC: 93 MG/DL — SIGNIFICANT CHANGE UP
TRIGL SERPL-MCNC: SIGNIFICANT CHANGE UP
TRIGL SERPL-MCNC: SIGNIFICANT CHANGE UP
TROPONIN T, HIGH SENSITIVITY RESULT: 68 NG/L — CRITICAL HIGH (ref 0–51)
TROPONIN T, HIGH SENSITIVITY RESULT: 71 NG/L — CRITICAL HIGH (ref 0–51)
TROPONIN T, HIGH SENSITIVITY RESULT: 77 NG/L — CRITICAL HIGH (ref 0–51)
TROPONIN T, HIGH SENSITIVITY RESULT: 77 NG/L — CRITICAL HIGH (ref 0–51)
TROPONIN T, HIGH SENSITIVITY RESULT: 81 NG/L — CRITICAL HIGH (ref 0–51)
TSH SERPL-MCNC: 2.43 UIU/ML — SIGNIFICANT CHANGE UP (ref 0.27–4.2)
TSH SERPL-MCNC: 2.43 UIU/ML — SIGNIFICANT CHANGE UP (ref 0.27–4.2)
TSH SERPL-MCNC: 2.52 UIU/ML — SIGNIFICANT CHANGE UP (ref 0.27–4.2)
TSH SERPL-MCNC: 3.57 UIU/ML — SIGNIFICANT CHANGE UP (ref 0.27–4.2)
TSH SERPL-MCNC: 3.58 UIU/ML — SIGNIFICANT CHANGE UP (ref 0.27–4.2)
TSH SERPL-MCNC: 5.25 UIU/ML — HIGH (ref 0.27–4.2)
TUBE TYPE: SIGNIFICANT CHANGE UP
UIBC SERPL-MCNC: 133 UG/DL — SIGNIFICANT CHANGE UP (ref 110–370)
UIBC SERPL-MCNC: 137 UG/DL — SIGNIFICANT CHANGE UP (ref 110–370)
UIBC SERPL-MCNC: 168 UG/DL — SIGNIFICANT CHANGE UP (ref 110–370)
UIBC SERPL-MCNC: 230 UG/DL — SIGNIFICANT CHANGE UP (ref 110–370)
URATE SERPL-MCNC: 2.9 MG/DL — LOW (ref 3.4–8.8)
URATE SERPL-MCNC: 3.1 MG/DL — LOW (ref 3.4–8.8)
UROBILINOGEN FLD QL: 0.2 MG/DL — SIGNIFICANT CHANGE UP (ref 0.2–1)
UROBILINOGEN FLD QL: 1 MG/DL — SIGNIFICANT CHANGE UP (ref 0.2–1)
UUN UR-MCNC: 1127 MG/DL — SIGNIFICANT CHANGE UP
UUN UR-MCNC: 1143 MG/DL — SIGNIFICANT CHANGE UP
UUN UR-MCNC: 1147 MG/DL — SIGNIFICANT CHANGE UP
UUN UR-MCNC: 1166 MG/DL — SIGNIFICANT CHANGE UP
UUN UR-MCNC: 1196 MG/DL — SIGNIFICANT CHANGE UP
UUN UR-MCNC: 598 MG/DL — SIGNIFICANT CHANGE UP
UUN UR-MCNC: 614 MG/DL — SIGNIFICANT CHANGE UP
UUN UR-MCNC: 730 MG/DL — SIGNIFICANT CHANGE UP
UUN UR-MCNC: 743 MG/DL — SIGNIFICANT CHANGE UP
UUN UR-MCNC: 908 MG/DL — SIGNIFICANT CHANGE UP
VIT B12 SERPL-MCNC: 1552 PG/ML — HIGH (ref 232–1245)
VIT B12 SERPL-MCNC: 671 PG/ML — SIGNIFICANT CHANGE UP (ref 232–1245)
VIT D25+D1,25 OH+D1,25 PNL SERPL-MCNC: 7.8 PG/ML — LOW (ref 19.9–79.3)
VIT D25+D1,25 OH+D1,25 PNL SERPL-MCNC: 7.9 PG/ML — LOW (ref 19.9–79.3)
VWF AG ACT/NOR PPP IA: 637 % — HIGH (ref 63–170)
VWF CBA/VWF AG PPP IA-RTO: SIGNIFICANT CHANGE UP
VWF CBA/VWF AG PPP IA-RTO: SIGNIFICANT CHANGE UP
VWF:RCO ACT/NOR PPP PL AGG: >390 % — HIGH (ref 45–133)
WBC # BLD: 10 K/UL — SIGNIFICANT CHANGE UP (ref 3.8–10.5)
WBC # BLD: 10.08 K/UL — SIGNIFICANT CHANGE UP (ref 3.8–10.5)
WBC # BLD: 10.23 K/UL — SIGNIFICANT CHANGE UP (ref 3.8–10.5)
WBC # BLD: 10.26 K/UL — SIGNIFICANT CHANGE UP (ref 3.8–10.5)
WBC # BLD: 10.29 K/UL — SIGNIFICANT CHANGE UP (ref 3.8–10.5)
WBC # BLD: 10.29 K/UL — SIGNIFICANT CHANGE UP (ref 3.8–10.5)
WBC # BLD: 10.32 K/UL — SIGNIFICANT CHANGE UP (ref 3.8–10.5)
WBC # BLD: 10.39 K/UL — SIGNIFICANT CHANGE UP (ref 3.8–10.5)
WBC # BLD: 10.43 K/UL — SIGNIFICANT CHANGE UP (ref 3.8–10.5)
WBC # BLD: 10.48 K/UL — SIGNIFICANT CHANGE UP (ref 3.8–10.5)
WBC # BLD: 10.57 K/UL — HIGH (ref 3.8–10.5)
WBC # BLD: 10.59 K/UL — HIGH (ref 3.8–10.5)
WBC # BLD: 10.79 K/UL — HIGH (ref 3.8–10.5)
WBC # BLD: 10.79 K/UL — HIGH (ref 3.8–10.5)
WBC # BLD: 10.81 K/UL — HIGH (ref 3.8–10.5)
WBC # BLD: 10.89 K/UL — HIGH (ref 3.8–10.5)
WBC # BLD: 10.89 K/UL — HIGH (ref 3.8–10.5)
WBC # BLD: 10.9 K/UL — HIGH (ref 3.8–10.5)
WBC # BLD: 10.94 K/UL — HIGH (ref 3.8–10.5)
WBC # BLD: 10.98 K/UL — HIGH (ref 3.8–10.5)
WBC # BLD: 11.08 K/UL — HIGH (ref 3.8–10.5)
WBC # BLD: 11.11 K/UL — HIGH (ref 3.8–10.5)
WBC # BLD: 11.13 K/UL — HIGH (ref 3.8–10.5)
WBC # BLD: 11.17 K/UL — HIGH (ref 3.8–10.5)
WBC # BLD: 11.22 K/UL — HIGH (ref 3.8–10.5)
WBC # BLD: 11.36 K/UL — HIGH (ref 3.8–10.5)
WBC # BLD: 11.41 K/UL — HIGH (ref 3.8–10.5)
WBC # BLD: 11.46 K/UL — HIGH (ref 3.8–10.5)
WBC # BLD: 11.46 K/UL — HIGH (ref 3.8–10.5)
WBC # BLD: 11.57 K/UL — HIGH (ref 3.8–10.5)
WBC # BLD: 11.69 K/UL — HIGH (ref 3.8–10.5)
WBC # BLD: 11.84 K/UL — HIGH (ref 3.8–10.5)
WBC # BLD: 11.87 K/UL — HIGH (ref 3.8–10.5)
WBC # BLD: 12.01 K/UL — HIGH (ref 3.8–10.5)
WBC # BLD: 12.05 K/UL — HIGH (ref 3.8–10.5)
WBC # BLD: 12.14 K/UL — HIGH (ref 3.8–10.5)
WBC # BLD: 12.18 K/UL — HIGH (ref 3.8–10.5)
WBC # BLD: 12.29 K/UL — HIGH (ref 3.8–10.5)
WBC # BLD: 12.44 K/UL — HIGH (ref 3.8–10.5)
WBC # BLD: 12.44 K/UL — HIGH (ref 3.8–10.5)
WBC # BLD: 12.47 K/UL — HIGH (ref 3.8–10.5)
WBC # BLD: 12.5 K/UL — HIGH (ref 3.8–10.5)
WBC # BLD: 12.68 K/UL — HIGH (ref 3.8–10.5)
WBC # BLD: 12.68 K/UL — HIGH (ref 3.8–10.5)
WBC # BLD: 12.7 K/UL — HIGH (ref 3.8–10.5)
WBC # BLD: 12.75 K/UL — HIGH (ref 3.8–10.5)
WBC # BLD: 12.76 K/UL — HIGH (ref 3.8–10.5)
WBC # BLD: 12.76 K/UL — HIGH (ref 3.8–10.5)
WBC # BLD: 12.87 K/UL — HIGH (ref 3.8–10.5)
WBC # BLD: 12.94 K/UL — HIGH (ref 3.8–10.5)
WBC # BLD: 13.12 K/UL — HIGH (ref 3.8–10.5)
WBC # BLD: 13.21 K/UL — HIGH (ref 3.8–10.5)
WBC # BLD: 13.5 K/UL — HIGH (ref 3.8–10.5)
WBC # BLD: 13.55 K/UL — HIGH (ref 3.8–10.5)
WBC # BLD: 13.71 K/UL — HIGH (ref 3.8–10.5)
WBC # BLD: 13.81 K/UL — HIGH (ref 3.8–10.5)
WBC # BLD: 14.15 K/UL — HIGH (ref 3.8–10.5)
WBC # BLD: 14.15 K/UL — HIGH (ref 3.8–10.5)
WBC # BLD: 14.24 K/UL — HIGH (ref 3.8–10.5)
WBC # BLD: 14.33 K/UL — HIGH (ref 3.8–10.5)
WBC # BLD: 14.39 K/UL — HIGH (ref 3.8–10.5)
WBC # BLD: 14.41 K/UL — HIGH (ref 3.8–10.5)
WBC # BLD: 14.43 K/UL — HIGH (ref 3.8–10.5)
WBC # BLD: 14.43 K/UL — HIGH (ref 3.8–10.5)
WBC # BLD: 14.51 K/UL — HIGH (ref 3.8–10.5)
WBC # BLD: 14.81 K/UL — HIGH (ref 3.8–10.5)
WBC # BLD: 14.82 K/UL — HIGH (ref 3.8–10.5)
WBC # BLD: 14.82 K/UL — HIGH (ref 3.8–10.5)
WBC # BLD: 14.86 K/UL — HIGH (ref 3.8–10.5)
WBC # BLD: 14.88 K/UL — HIGH (ref 3.8–10.5)
WBC # BLD: 15.25 K/UL — HIGH (ref 3.8–10.5)
WBC # BLD: 15.44 K/UL — HIGH (ref 3.8–10.5)
WBC # BLD: 16.16 K/UL — HIGH (ref 3.8–10.5)
WBC # BLD: 16.17 K/UL — HIGH (ref 3.8–10.5)
WBC # BLD: 16.61 K/UL — HIGH (ref 3.8–10.5)
WBC # BLD: 17.16 K/UL — HIGH (ref 3.8–10.5)
WBC # BLD: 18.01 K/UL — HIGH (ref 3.8–10.5)
WBC # BLD: 18.21 K/UL — HIGH (ref 3.8–10.5)
WBC # BLD: 18.72 K/UL — HIGH (ref 3.8–10.5)
WBC # BLD: 18.86 K/UL — HIGH (ref 3.8–10.5)
WBC # BLD: 19.22 K/UL — HIGH (ref 3.8–10.5)
WBC # BLD: 20.08 K/UL — HIGH (ref 3.8–10.5)
WBC # BLD: 20.75 K/UL — HIGH (ref 3.8–10.5)
WBC # BLD: 21.96 K/UL — HIGH (ref 3.8–10.5)
WBC # BLD: 22.67 K/UL — HIGH (ref 3.8–10.5)
WBC # BLD: 23.38 K/UL — HIGH (ref 3.8–10.5)
WBC # BLD: 23.62 K/UL — HIGH (ref 3.8–10.5)
WBC # BLD: 23.69 K/UL — HIGH (ref 3.8–10.5)
WBC # BLD: 24.15 K/UL — HIGH (ref 3.8–10.5)
WBC # BLD: 26.76 K/UL — HIGH (ref 3.8–10.5)
WBC # BLD: 27.44 K/UL — HIGH (ref 3.8–10.5)
WBC # BLD: 27.86 K/UL — HIGH (ref 3.8–10.5)
WBC # BLD: 29.3 K/UL — HIGH (ref 3.8–10.5)
WBC # BLD: 30.93 K/UL — HIGH (ref 3.8–10.5)
WBC # BLD: 6.93 K/UL — SIGNIFICANT CHANGE UP (ref 3.8–10.5)
WBC # BLD: 7.02 K/UL — SIGNIFICANT CHANGE UP (ref 3.8–10.5)
WBC # BLD: 7.02 K/UL — SIGNIFICANT CHANGE UP (ref 3.8–10.5)
WBC # BLD: 7.15 K/UL — SIGNIFICANT CHANGE UP (ref 3.8–10.5)
WBC # BLD: 7.31 K/UL — SIGNIFICANT CHANGE UP (ref 3.8–10.5)
WBC # BLD: 7.31 K/UL — SIGNIFICANT CHANGE UP (ref 3.8–10.5)
WBC # BLD: 7.38 K/UL — SIGNIFICANT CHANGE UP (ref 3.8–10.5)
WBC # BLD: 7.59 K/UL — SIGNIFICANT CHANGE UP (ref 3.8–10.5)
WBC # BLD: 7.65 K/UL — SIGNIFICANT CHANGE UP (ref 3.8–10.5)
WBC # BLD: 7.79 K/UL — SIGNIFICANT CHANGE UP (ref 3.8–10.5)
WBC # BLD: 7.84 K/UL — SIGNIFICANT CHANGE UP (ref 3.8–10.5)
WBC # BLD: 7.92 K/UL — SIGNIFICANT CHANGE UP (ref 3.8–10.5)
WBC # BLD: 8.06 K/UL — SIGNIFICANT CHANGE UP (ref 3.8–10.5)
WBC # BLD: 8.06 K/UL — SIGNIFICANT CHANGE UP (ref 3.8–10.5)
WBC # BLD: 8.13 K/UL — SIGNIFICANT CHANGE UP (ref 3.8–10.5)
WBC # BLD: 8.14 K/UL — SIGNIFICANT CHANGE UP (ref 3.8–10.5)
WBC # BLD: 8.16 K/UL — SIGNIFICANT CHANGE UP (ref 3.8–10.5)
WBC # BLD: 8.16 K/UL — SIGNIFICANT CHANGE UP (ref 3.8–10.5)
WBC # BLD: 8.25 K/UL — SIGNIFICANT CHANGE UP (ref 3.8–10.5)
WBC # BLD: 8.31 K/UL — SIGNIFICANT CHANGE UP (ref 3.8–10.5)
WBC # BLD: 8.31 K/UL — SIGNIFICANT CHANGE UP (ref 3.8–10.5)
WBC # BLD: 8.33 K/UL — SIGNIFICANT CHANGE UP (ref 3.8–10.5)
WBC # BLD: 8.52 K/UL — SIGNIFICANT CHANGE UP (ref 3.8–10.5)
WBC # BLD: 8.54 K/UL — SIGNIFICANT CHANGE UP (ref 3.8–10.5)
WBC # BLD: 8.54 K/UL — SIGNIFICANT CHANGE UP (ref 3.8–10.5)
WBC # BLD: 8.63 K/UL — SIGNIFICANT CHANGE UP (ref 3.8–10.5)
WBC # BLD: 8.64 K/UL — SIGNIFICANT CHANGE UP (ref 3.8–10.5)
WBC # BLD: 8.68 K/UL — SIGNIFICANT CHANGE UP (ref 3.8–10.5)
WBC # BLD: 8.68 K/UL — SIGNIFICANT CHANGE UP (ref 3.8–10.5)
WBC # BLD: 8.69 K/UL — SIGNIFICANT CHANGE UP (ref 3.8–10.5)
WBC # BLD: 8.71 K/UL — SIGNIFICANT CHANGE UP (ref 3.8–10.5)
WBC # BLD: 8.79 K/UL — SIGNIFICANT CHANGE UP (ref 3.8–10.5)
WBC # BLD: 8.9 K/UL — SIGNIFICANT CHANGE UP (ref 3.8–10.5)
WBC # BLD: 8.96 K/UL — SIGNIFICANT CHANGE UP (ref 3.8–10.5)
WBC # BLD: 8.97 K/UL — SIGNIFICANT CHANGE UP (ref 3.8–10.5)
WBC # BLD: 9.04 K/UL — SIGNIFICANT CHANGE UP (ref 3.8–10.5)
WBC # BLD: 9.22 K/UL — SIGNIFICANT CHANGE UP (ref 3.8–10.5)
WBC # BLD: 9.25 K/UL — SIGNIFICANT CHANGE UP (ref 3.8–10.5)
WBC # BLD: 9.29 K/UL — SIGNIFICANT CHANGE UP (ref 3.8–10.5)
WBC # BLD: 9.29 K/UL — SIGNIFICANT CHANGE UP (ref 3.8–10.5)
WBC # BLD: 9.35 K/UL — SIGNIFICANT CHANGE UP (ref 3.8–10.5)
WBC # BLD: 9.38 K/UL — SIGNIFICANT CHANGE UP (ref 3.8–10.5)
WBC # BLD: 9.41 K/UL — SIGNIFICANT CHANGE UP (ref 3.8–10.5)
WBC # BLD: 9.43 K/UL — SIGNIFICANT CHANGE UP (ref 3.8–10.5)
WBC # BLD: 9.46 K/UL — SIGNIFICANT CHANGE UP (ref 3.8–10.5)
WBC # BLD: 9.6 K/UL — SIGNIFICANT CHANGE UP (ref 3.8–10.5)
WBC # BLD: 9.62 K/UL — SIGNIFICANT CHANGE UP (ref 3.8–10.5)
WBC # BLD: 9.74 K/UL — SIGNIFICANT CHANGE UP (ref 3.8–10.5)
WBC # BLD: 9.77 K/UL — SIGNIFICANT CHANGE UP (ref 3.8–10.5)
WBC # BLD: 9.9 K/UL — SIGNIFICANT CHANGE UP (ref 3.8–10.5)
WBC # BLD: 9.93 K/UL — SIGNIFICANT CHANGE UP (ref 3.8–10.5)
WBC # FLD AUTO: 10 K/UL — SIGNIFICANT CHANGE UP (ref 3.8–10.5)
WBC # FLD AUTO: 10.08 K/UL — SIGNIFICANT CHANGE UP (ref 3.8–10.5)
WBC # FLD AUTO: 10.23 K/UL — SIGNIFICANT CHANGE UP (ref 3.8–10.5)
WBC # FLD AUTO: 10.26 K/UL — SIGNIFICANT CHANGE UP (ref 3.8–10.5)
WBC # FLD AUTO: 10.29 K/UL — SIGNIFICANT CHANGE UP (ref 3.8–10.5)
WBC # FLD AUTO: 10.29 K/UL — SIGNIFICANT CHANGE UP (ref 3.8–10.5)
WBC # FLD AUTO: 10.32 K/UL — SIGNIFICANT CHANGE UP (ref 3.8–10.5)
WBC # FLD AUTO: 10.39 K/UL — SIGNIFICANT CHANGE UP (ref 3.8–10.5)
WBC # FLD AUTO: 10.43 K/UL — SIGNIFICANT CHANGE UP (ref 3.8–10.5)
WBC # FLD AUTO: 10.48 K/UL — SIGNIFICANT CHANGE UP (ref 3.8–10.5)
WBC # FLD AUTO: 10.57 K/UL — HIGH (ref 3.8–10.5)
WBC # FLD AUTO: 10.59 K/UL — HIGH (ref 3.8–10.5)
WBC # FLD AUTO: 10.79 K/UL — HIGH (ref 3.8–10.5)
WBC # FLD AUTO: 10.79 K/UL — HIGH (ref 3.8–10.5)
WBC # FLD AUTO: 10.81 K/UL — HIGH (ref 3.8–10.5)
WBC # FLD AUTO: 10.89 K/UL — HIGH (ref 3.8–10.5)
WBC # FLD AUTO: 10.89 K/UL — HIGH (ref 3.8–10.5)
WBC # FLD AUTO: 10.9 K/UL — HIGH (ref 3.8–10.5)
WBC # FLD AUTO: 10.94 K/UL — HIGH (ref 3.8–10.5)
WBC # FLD AUTO: 10.98 K/UL — HIGH (ref 3.8–10.5)
WBC # FLD AUTO: 11.08 K/UL — HIGH (ref 3.8–10.5)
WBC # FLD AUTO: 11.11 K/UL — HIGH (ref 3.8–10.5)
WBC # FLD AUTO: 11.13 K/UL — HIGH (ref 3.8–10.5)
WBC # FLD AUTO: 11.17 K/UL — HIGH (ref 3.8–10.5)
WBC # FLD AUTO: 11.22 K/UL — HIGH (ref 3.8–10.5)
WBC # FLD AUTO: 11.36 K/UL — HIGH (ref 3.8–10.5)
WBC # FLD AUTO: 11.41 K/UL — HIGH (ref 3.8–10.5)
WBC # FLD AUTO: 11.46 K/UL — HIGH (ref 3.8–10.5)
WBC # FLD AUTO: 11.46 K/UL — HIGH (ref 3.8–10.5)
WBC # FLD AUTO: 11.57 K/UL — HIGH (ref 3.8–10.5)
WBC # FLD AUTO: 11.69 K/UL — HIGH (ref 3.8–10.5)
WBC # FLD AUTO: 11.84 K/UL — HIGH (ref 3.8–10.5)
WBC # FLD AUTO: 11.87 K/UL — HIGH (ref 3.8–10.5)
WBC # FLD AUTO: 12.01 K/UL — HIGH (ref 3.8–10.5)
WBC # FLD AUTO: 12.05 K/UL — HIGH (ref 3.8–10.5)
WBC # FLD AUTO: 12.14 K/UL — HIGH (ref 3.8–10.5)
WBC # FLD AUTO: 12.18 K/UL — HIGH (ref 3.8–10.5)
WBC # FLD AUTO: 12.29 K/UL — HIGH (ref 3.8–10.5)
WBC # FLD AUTO: 12.44 K/UL — HIGH (ref 3.8–10.5)
WBC # FLD AUTO: 12.44 K/UL — HIGH (ref 3.8–10.5)
WBC # FLD AUTO: 12.47 K/UL — HIGH (ref 3.8–10.5)
WBC # FLD AUTO: 12.5 K/UL — HIGH (ref 3.8–10.5)
WBC # FLD AUTO: 12.68 K/UL — HIGH (ref 3.8–10.5)
WBC # FLD AUTO: 12.68 K/UL — HIGH (ref 3.8–10.5)
WBC # FLD AUTO: 12.7 K/UL — HIGH (ref 3.8–10.5)
WBC # FLD AUTO: 12.75 K/UL — HIGH (ref 3.8–10.5)
WBC # FLD AUTO: 12.76 K/UL — HIGH (ref 3.8–10.5)
WBC # FLD AUTO: 12.76 K/UL — HIGH (ref 3.8–10.5)
WBC # FLD AUTO: 12.87 K/UL — HIGH (ref 3.8–10.5)
WBC # FLD AUTO: 12.94 K/UL — HIGH (ref 3.8–10.5)
WBC # FLD AUTO: 13.12 K/UL — HIGH (ref 3.8–10.5)
WBC # FLD AUTO: 13.21 K/UL — HIGH (ref 3.8–10.5)
WBC # FLD AUTO: 13.5 K/UL — HIGH (ref 3.8–10.5)
WBC # FLD AUTO: 13.55 K/UL — HIGH (ref 3.8–10.5)
WBC # FLD AUTO: 13.71 K/UL — HIGH (ref 3.8–10.5)
WBC # FLD AUTO: 13.81 K/UL — HIGH (ref 3.8–10.5)
WBC # FLD AUTO: 14.15 K/UL — HIGH (ref 3.8–10.5)
WBC # FLD AUTO: 14.15 K/UL — HIGH (ref 3.8–10.5)
WBC # FLD AUTO: 14.24 K/UL — HIGH (ref 3.8–10.5)
WBC # FLD AUTO: 14.33 K/UL — HIGH (ref 3.8–10.5)
WBC # FLD AUTO: 14.39 K/UL — HIGH (ref 3.8–10.5)
WBC # FLD AUTO: 14.41 K/UL — HIGH (ref 3.8–10.5)
WBC # FLD AUTO: 14.43 K/UL — HIGH (ref 3.8–10.5)
WBC # FLD AUTO: 14.43 K/UL — HIGH (ref 3.8–10.5)
WBC # FLD AUTO: 14.51 K/UL — HIGH (ref 3.8–10.5)
WBC # FLD AUTO: 14.81 K/UL — HIGH (ref 3.8–10.5)
WBC # FLD AUTO: 14.82 K/UL — HIGH (ref 3.8–10.5)
WBC # FLD AUTO: 14.82 K/UL — HIGH (ref 3.8–10.5)
WBC # FLD AUTO: 14.86 K/UL — HIGH (ref 3.8–10.5)
WBC # FLD AUTO: 14.88 K/UL — HIGH (ref 3.8–10.5)
WBC # FLD AUTO: 15.25 K/UL — HIGH (ref 3.8–10.5)
WBC # FLD AUTO: 15.44 K/UL — HIGH (ref 3.8–10.5)
WBC # FLD AUTO: 16.16 K/UL — HIGH (ref 3.8–10.5)
WBC # FLD AUTO: 16.17 K/UL — HIGH (ref 3.8–10.5)
WBC # FLD AUTO: 16.61 K/UL — HIGH (ref 3.8–10.5)
WBC # FLD AUTO: 17.16 K/UL — HIGH (ref 3.8–10.5)
WBC # FLD AUTO: 18.01 K/UL — HIGH (ref 3.8–10.5)
WBC # FLD AUTO: 18.21 K/UL — HIGH (ref 3.8–10.5)
WBC # FLD AUTO: 18.72 K/UL — HIGH (ref 3.8–10.5)
WBC # FLD AUTO: 18.86 K/UL — HIGH (ref 3.8–10.5)
WBC # FLD AUTO: 19.22 K/UL — HIGH (ref 3.8–10.5)
WBC # FLD AUTO: 20.08 K/UL — HIGH (ref 3.8–10.5)
WBC # FLD AUTO: 20.75 K/UL — HIGH (ref 3.8–10.5)
WBC # FLD AUTO: 21.96 K/UL — HIGH (ref 3.8–10.5)
WBC # FLD AUTO: 22.67 K/UL — HIGH (ref 3.8–10.5)
WBC # FLD AUTO: 23.38 K/UL — HIGH (ref 3.8–10.5)
WBC # FLD AUTO: 23.62 K/UL — HIGH (ref 3.8–10.5)
WBC # FLD AUTO: 23.69 K/UL — HIGH (ref 3.8–10.5)
WBC # FLD AUTO: 24.15 K/UL — HIGH (ref 3.8–10.5)
WBC # FLD AUTO: 26.76 K/UL — HIGH (ref 3.8–10.5)
WBC # FLD AUTO: 27.44 K/UL — HIGH (ref 3.8–10.5)
WBC # FLD AUTO: 27.86 K/UL — HIGH (ref 3.8–10.5)
WBC # FLD AUTO: 29.3 K/UL — HIGH (ref 3.8–10.5)
WBC # FLD AUTO: 30.93 K/UL — HIGH (ref 3.8–10.5)
WBC # FLD AUTO: 6.93 K/UL — SIGNIFICANT CHANGE UP (ref 3.8–10.5)
WBC # FLD AUTO: 7.02 K/UL — SIGNIFICANT CHANGE UP (ref 3.8–10.5)
WBC # FLD AUTO: 7.02 K/UL — SIGNIFICANT CHANGE UP (ref 3.8–10.5)
WBC # FLD AUTO: 7.15 K/UL — SIGNIFICANT CHANGE UP (ref 3.8–10.5)
WBC # FLD AUTO: 7.31 K/UL — SIGNIFICANT CHANGE UP (ref 3.8–10.5)
WBC # FLD AUTO: 7.31 K/UL — SIGNIFICANT CHANGE UP (ref 3.8–10.5)
WBC # FLD AUTO: 7.38 K/UL — SIGNIFICANT CHANGE UP (ref 3.8–10.5)
WBC # FLD AUTO: 7.59 K/UL — SIGNIFICANT CHANGE UP (ref 3.8–10.5)
WBC # FLD AUTO: 7.65 K/UL — SIGNIFICANT CHANGE UP (ref 3.8–10.5)
WBC # FLD AUTO: 7.79 K/UL — SIGNIFICANT CHANGE UP (ref 3.8–10.5)
WBC # FLD AUTO: 7.84 K/UL — SIGNIFICANT CHANGE UP (ref 3.8–10.5)
WBC # FLD AUTO: 7.92 K/UL — SIGNIFICANT CHANGE UP (ref 3.8–10.5)
WBC # FLD AUTO: 8.06 K/UL — SIGNIFICANT CHANGE UP (ref 3.8–10.5)
WBC # FLD AUTO: 8.06 K/UL — SIGNIFICANT CHANGE UP (ref 3.8–10.5)
WBC # FLD AUTO: 8.13 K/UL — SIGNIFICANT CHANGE UP (ref 3.8–10.5)
WBC # FLD AUTO: 8.14 K/UL — SIGNIFICANT CHANGE UP (ref 3.8–10.5)
WBC # FLD AUTO: 8.16 K/UL — SIGNIFICANT CHANGE UP (ref 3.8–10.5)
WBC # FLD AUTO: 8.16 K/UL — SIGNIFICANT CHANGE UP (ref 3.8–10.5)
WBC # FLD AUTO: 8.25 K/UL — SIGNIFICANT CHANGE UP (ref 3.8–10.5)
WBC # FLD AUTO: 8.31 K/UL — SIGNIFICANT CHANGE UP (ref 3.8–10.5)
WBC # FLD AUTO: 8.31 K/UL — SIGNIFICANT CHANGE UP (ref 3.8–10.5)
WBC # FLD AUTO: 8.33 K/UL — SIGNIFICANT CHANGE UP (ref 3.8–10.5)
WBC # FLD AUTO: 8.52 K/UL — SIGNIFICANT CHANGE UP (ref 3.8–10.5)
WBC # FLD AUTO: 8.54 K/UL — SIGNIFICANT CHANGE UP (ref 3.8–10.5)
WBC # FLD AUTO: 8.54 K/UL — SIGNIFICANT CHANGE UP (ref 3.8–10.5)
WBC # FLD AUTO: 8.63 K/UL — SIGNIFICANT CHANGE UP (ref 3.8–10.5)
WBC # FLD AUTO: 8.64 K/UL — SIGNIFICANT CHANGE UP (ref 3.8–10.5)
WBC # FLD AUTO: 8.68 K/UL — SIGNIFICANT CHANGE UP (ref 3.8–10.5)
WBC # FLD AUTO: 8.68 K/UL — SIGNIFICANT CHANGE UP (ref 3.8–10.5)
WBC # FLD AUTO: 8.69 K/UL — SIGNIFICANT CHANGE UP (ref 3.8–10.5)
WBC # FLD AUTO: 8.71 K/UL — SIGNIFICANT CHANGE UP (ref 3.8–10.5)
WBC # FLD AUTO: 8.79 K/UL — SIGNIFICANT CHANGE UP (ref 3.8–10.5)
WBC # FLD AUTO: 8.9 K/UL — SIGNIFICANT CHANGE UP (ref 3.8–10.5)
WBC # FLD AUTO: 8.96 K/UL — SIGNIFICANT CHANGE UP (ref 3.8–10.5)
WBC # FLD AUTO: 8.97 K/UL — SIGNIFICANT CHANGE UP (ref 3.8–10.5)
WBC # FLD AUTO: 9.04 K/UL — SIGNIFICANT CHANGE UP (ref 3.8–10.5)
WBC # FLD AUTO: 9.22 K/UL — SIGNIFICANT CHANGE UP (ref 3.8–10.5)
WBC # FLD AUTO: 9.25 K/UL — SIGNIFICANT CHANGE UP (ref 3.8–10.5)
WBC # FLD AUTO: 9.29 K/UL — SIGNIFICANT CHANGE UP (ref 3.8–10.5)
WBC # FLD AUTO: 9.29 K/UL — SIGNIFICANT CHANGE UP (ref 3.8–10.5)
WBC # FLD AUTO: 9.35 K/UL — SIGNIFICANT CHANGE UP (ref 3.8–10.5)
WBC # FLD AUTO: 9.38 K/UL — SIGNIFICANT CHANGE UP (ref 3.8–10.5)
WBC # FLD AUTO: 9.41 K/UL — SIGNIFICANT CHANGE UP (ref 3.8–10.5)
WBC # FLD AUTO: 9.43 K/UL — SIGNIFICANT CHANGE UP (ref 3.8–10.5)
WBC # FLD AUTO: 9.46 K/UL — SIGNIFICANT CHANGE UP (ref 3.8–10.5)
WBC # FLD AUTO: 9.6 K/UL — SIGNIFICANT CHANGE UP (ref 3.8–10.5)
WBC # FLD AUTO: 9.62 K/UL — SIGNIFICANT CHANGE UP (ref 3.8–10.5)
WBC # FLD AUTO: 9.74 K/UL — SIGNIFICANT CHANGE UP (ref 3.8–10.5)
WBC # FLD AUTO: 9.77 K/UL — SIGNIFICANT CHANGE UP (ref 3.8–10.5)
WBC # FLD AUTO: 9.9 K/UL — SIGNIFICANT CHANGE UP (ref 3.8–10.5)
WBC # FLD AUTO: 9.93 K/UL — SIGNIFICANT CHANGE UP (ref 3.8–10.5)
WBC UR QL: 0 /HPF — SIGNIFICANT CHANGE UP (ref 0–5)
WBC UR QL: 10 /HPF — HIGH (ref 0–5)
WBC UR QL: 2 /HPF — SIGNIFICANT CHANGE UP (ref 0–5)
WBC UR QL: 4 /HPF — SIGNIFICANT CHANGE UP (ref 0–5)
WBC UR QL: 5 /HPF — SIGNIFICANT CHANGE UP (ref 0–5)
WBC UR QL: 5 /HPF — SIGNIFICANT CHANGE UP (ref 0–5)
ZINC SERPL-MCNC: 120 UG/DL — HIGH (ref 44–115)
ZINC SERPL-MCNC: 120 UG/DL — HIGH (ref 44–115)
ZINC SERPL-MCNC: 41 UG/DL — LOW (ref 44–115)
ZINC SERPL-MCNC: 48 UG/DL — SIGNIFICANT CHANGE UP (ref 44–115)
ZINC SERPL-MCNC: 62 UG/DL — SIGNIFICANT CHANGE UP (ref 44–115)
ZINC SERPL-MCNC: 92 UG/DL — SIGNIFICANT CHANGE UP (ref 44–115)

## 2024-01-01 PROCEDURE — 83605 ASSAY OF LACTIC ACID: CPT

## 2024-01-01 PROCEDURE — 36246 INS CATH ABD/L-EXT ART 2ND: CPT

## 2024-01-01 PROCEDURE — 93010 ELECTROCARDIOGRAM REPORT: CPT

## 2024-01-01 PROCEDURE — 84540 ASSAY OF URINE/UREA-N: CPT

## 2024-01-01 PROCEDURE — 71045 X-RAY EXAM CHEST 1 VIEW: CPT

## 2024-01-01 PROCEDURE — 99232 SBSQ HOSP IP/OBS MODERATE 35: CPT | Mod: GC

## 2024-01-01 PROCEDURE — 99291 CRITICAL CARE FIRST HOUR: CPT

## 2024-01-01 PROCEDURE — 81001 URINALYSIS AUTO W/SCOPE: CPT

## 2024-01-01 PROCEDURE — 99231 SBSQ HOSP IP/OBS SF/LOW 25: CPT | Mod: GC

## 2024-01-01 PROCEDURE — 71045 X-RAY EXAM CHEST 1 VIEW: CPT | Mod: 26

## 2024-01-01 PROCEDURE — 80048 BASIC METABOLIC PNL TOTAL CA: CPT

## 2024-01-01 PROCEDURE — 99232 SBSQ HOSP IP/OBS MODERATE 35: CPT

## 2024-01-01 PROCEDURE — 99498 ADVNCD CARE PLAN ADDL 30 MIN: CPT | Mod: 25

## 2024-01-01 PROCEDURE — 83735 ASSAY OF MAGNESIUM: CPT

## 2024-01-01 PROCEDURE — 86850 RBC ANTIBODY SCREEN: CPT

## 2024-01-01 PROCEDURE — 84478 ASSAY OF TRIGLYCERIDES: CPT

## 2024-01-01 PROCEDURE — 99233 SBSQ HOSP IP/OBS HIGH 50: CPT | Mod: GC

## 2024-01-01 PROCEDURE — 85610 PROTHROMBIN TIME: CPT

## 2024-01-01 PROCEDURE — 85730 THROMBOPLASTIN TIME PARTIAL: CPT

## 2024-01-01 PROCEDURE — 99233 SBSQ HOSP IP/OBS HIGH 50: CPT

## 2024-01-01 PROCEDURE — 87150 DNA/RNA AMPLIFIED PROBE: CPT

## 2024-01-01 PROCEDURE — P9037: CPT

## 2024-01-01 PROCEDURE — 86965 POOLING BLOOD PLATELETS: CPT

## 2024-01-01 PROCEDURE — 76705 ECHO EXAM OF ABDOMEN: CPT

## 2024-01-01 PROCEDURE — P9016: CPT

## 2024-01-01 PROCEDURE — 0225U NFCT DS DNA&RNA 21 SARSCOV2: CPT

## 2024-01-01 PROCEDURE — 36000 PLACE NEEDLE IN VEIN: CPT

## 2024-01-01 PROCEDURE — 84550 ASSAY OF BLOOD/URIC ACID: CPT

## 2024-01-01 PROCEDURE — 80076 HEPATIC FUNCTION PANEL: CPT

## 2024-01-01 PROCEDURE — 87070 CULTURE OTHR SPECIMN AEROBIC: CPT

## 2024-01-01 PROCEDURE — 82962 GLUCOSE BLOOD TEST: CPT

## 2024-01-01 PROCEDURE — 84100 ASSAY OF PHOSPHORUS: CPT

## 2024-01-01 PROCEDURE — 99285 EMERGENCY DEPT VISIT HI MDM: CPT

## 2024-01-01 PROCEDURE — 85291 CLOT FACTOR XIII FIBRIN SCRN: CPT

## 2024-01-01 PROCEDURE — 80053 COMPREHEN METABOLIC PANEL: CPT

## 2024-01-01 PROCEDURE — 85384 FIBRINOGEN ACTIVITY: CPT

## 2024-01-01 PROCEDURE — 84165 PROTEIN E-PHORESIS SERUM: CPT

## 2024-01-01 PROCEDURE — 85240 CLOT FACTOR VIII AHG 1 STAGE: CPT

## 2024-01-01 PROCEDURE — 83690 ASSAY OF LIPASE: CPT

## 2024-01-01 PROCEDURE — 87205 SMEAR GRAM STAIN: CPT

## 2024-01-01 PROCEDURE — 74174 CTA ABD&PLVS W/CONTRAST: CPT | Mod: 26

## 2024-01-01 PROCEDURE — 85732 THROMBOPLASTIN TIME PARTIAL: CPT

## 2024-01-01 PROCEDURE — 85027 COMPLETE CBC AUTOMATED: CPT

## 2024-01-01 PROCEDURE — 49465 FLUORO EXAM OF G/COLON TUBE: CPT

## 2024-01-01 PROCEDURE — 83010 ASSAY OF HAPTOGLOBIN QUANT: CPT

## 2024-01-01 PROCEDURE — 99223 1ST HOSP IP/OBS HIGH 75: CPT

## 2024-01-01 PROCEDURE — 85385 FIBRINOGEN ANTIGEN: CPT

## 2024-01-01 PROCEDURE — 36573 INSJ PICC RS&I 5 YR+: CPT

## 2024-01-01 PROCEDURE — 99222 1ST HOSP IP/OBS MODERATE 55: CPT | Mod: 25

## 2024-01-01 PROCEDURE — 82553 CREATINE MB FRACTION: CPT

## 2024-01-01 PROCEDURE — 86708 HEPATITIS A ANTIBODY: CPT

## 2024-01-01 PROCEDURE — P9012: CPT

## 2024-01-01 PROCEDURE — 82042 OTHER SOURCE ALBUMIN QUAN EA: CPT

## 2024-01-01 PROCEDURE — 86900 BLOOD TYPING SEROLOGIC ABO: CPT

## 2024-01-01 PROCEDURE — 84439 ASSAY OF FREE THYROXINE: CPT

## 2024-01-01 PROCEDURE — 85025 COMPLETE CBC W/AUTO DIFF WBC: CPT

## 2024-01-01 PROCEDURE — 83615 LACTATE (LD) (LDH) ENZYME: CPT

## 2024-01-01 PROCEDURE — 93306 TTE W/DOPPLER COMPLETE: CPT | Mod: 26

## 2024-01-01 PROCEDURE — 87102 FUNGUS ISOLATION CULTURE: CPT

## 2024-01-01 PROCEDURE — 82248 BILIRUBIN DIRECT: CPT

## 2024-01-01 PROCEDURE — 82247 BILIRUBIN TOTAL: CPT

## 2024-01-01 PROCEDURE — 99497 ADVNCD CARE PLAN 30 MIN: CPT | Mod: 25

## 2024-01-01 PROCEDURE — 87449 NOS EACH ORGANISM AG IA: CPT

## 2024-01-01 PROCEDURE — 97116 GAIT TRAINING THERAPY: CPT

## 2024-01-01 PROCEDURE — 84480 ASSAY TRIIODOTHYRONINE (T3): CPT

## 2024-01-01 PROCEDURE — P9059: CPT

## 2024-01-01 PROCEDURE — 99285 EMERGENCY DEPT VISIT HI MDM: CPT | Mod: 25

## 2024-01-01 PROCEDURE — 99222 1ST HOSP IP/OBS MODERATE 55: CPT

## 2024-01-01 PROCEDURE — 87324 CLOSTRIDIUM AG IA: CPT

## 2024-01-01 PROCEDURE — 87641 MR-STAPH DNA AMP PROBE: CPT

## 2024-01-01 PROCEDURE — 82803 BLOOD GASES ANY COMBINATION: CPT

## 2024-01-01 PROCEDURE — 86922 COMPATIBILITY TEST ANTIGLOB: CPT

## 2024-01-01 PROCEDURE — 74177 CT ABD & PELVIS W/CONTRAST: CPT | Mod: MC

## 2024-01-01 PROCEDURE — 94660 CPAP INITIATION&MGMT: CPT

## 2024-01-01 PROCEDURE — 87086 URINE CULTURE/COLONY COUNT: CPT

## 2024-01-01 PROCEDURE — 81003 URINALYSIS AUTO W/O SCOPE: CPT

## 2024-01-01 PROCEDURE — 93970 EXTREMITY STUDY: CPT

## 2024-01-01 PROCEDURE — 89051 BODY FLUID CELL COUNT: CPT

## 2024-01-01 PROCEDURE — 37244 VASC EMBOLIZE/OCCLUDE BLEED: CPT

## 2024-01-01 PROCEDURE — 36620 INSERTION CATHETER ARTERY: CPT

## 2024-01-01 PROCEDURE — 99231 SBSQ HOSP IP/OBS SF/LOW 25: CPT

## 2024-01-01 PROCEDURE — 36430 TRANSFUSION BLD/BLD COMPNT: CPT

## 2024-01-01 PROCEDURE — 85250 CLOT FACTOR IX PTC/CHRSTMAS: CPT

## 2024-01-01 PROCEDURE — 83036 HEMOGLOBIN GLYCOSYLATED A1C: CPT

## 2024-01-01 PROCEDURE — 36415 COLL VENOUS BLD VENIPUNCTURE: CPT

## 2024-01-01 PROCEDURE — 71046 X-RAY EXAM CHEST 2 VIEWS: CPT

## 2024-01-01 PROCEDURE — 36247 INS CATH ABD/L-EXT ART 3RD: CPT | Mod: LT

## 2024-01-01 PROCEDURE — 74174 CTA ABD&PLVS W/CONTRAST: CPT | Mod: MC

## 2024-01-01 PROCEDURE — 93005 ELECTROCARDIOGRAM TRACING: CPT

## 2024-01-01 PROCEDURE — 85014 HEMATOCRIT: CPT

## 2024-01-01 PROCEDURE — C1889: CPT

## 2024-01-01 PROCEDURE — 87186 SC STD MICRODIL/AGAR DIL: CPT

## 2024-01-01 PROCEDURE — 84156 ASSAY OF PROTEIN URINE: CPT

## 2024-01-01 PROCEDURE — 80074 ACUTE HEPATITIS PANEL: CPT

## 2024-01-01 PROCEDURE — 75726 ARTERY X-RAYS ABDOMEN: CPT | Mod: 26,XU

## 2024-01-01 PROCEDURE — 75774 ARTERY X-RAY EACH VESSEL: CPT | Mod: 59

## 2024-01-01 PROCEDURE — 74183 MRI ABD W/O CNTR FLWD CNTR: CPT | Mod: MC

## 2024-01-01 PROCEDURE — 87040 BLOOD CULTURE FOR BACTERIA: CPT

## 2024-01-01 PROCEDURE — 86901 BLOOD TYPING SEROLOGIC RH(D): CPT

## 2024-01-01 PROCEDURE — 88305 TISSUE EXAM BY PATHOLOGIST: CPT

## 2024-01-01 PROCEDURE — 85379 FIBRIN DEGRADATION QUANT: CPT

## 2024-01-01 PROCEDURE — 86880 COOMBS TEST DIRECT: CPT

## 2024-01-01 PROCEDURE — 84300 ASSAY OF URINE SODIUM: CPT

## 2024-01-01 PROCEDURE — 82140 ASSAY OF AMMONIA: CPT

## 2024-01-01 PROCEDURE — 85270 CLOT FACTOR XI PTA: CPT

## 2024-01-01 PROCEDURE — 84443 ASSAY THYROID STIM HORMONE: CPT

## 2024-01-01 PROCEDURE — 76705 ECHO EXAM OF ABDOMEN: CPT | Mod: 26

## 2024-01-01 PROCEDURE — 82610 CYSTATIN C: CPT

## 2024-01-01 PROCEDURE — 76937 US GUIDE VASCULAR ACCESS: CPT | Mod: 26

## 2024-01-01 PROCEDURE — 82570 ASSAY OF URINE CREATININE: CPT

## 2024-01-01 PROCEDURE — 84133 ASSAY OF URINE POTASSIUM: CPT

## 2024-01-01 PROCEDURE — 88112 CYTOPATH CELL ENHANCE TECH: CPT | Mod: 26

## 2024-01-01 PROCEDURE — 83550 IRON BINDING TEST: CPT

## 2024-01-01 PROCEDURE — 88305 TISSUE EXAM BY PATHOLOGIST: CPT | Mod: 26

## 2024-01-01 PROCEDURE — 80061 LIPID PANEL: CPT

## 2024-01-01 PROCEDURE — 85045 AUTOMATED RETICULOCYTE COUNT: CPT

## 2024-01-01 PROCEDURE — 94640 AIRWAY INHALATION TREATMENT: CPT

## 2024-01-01 PROCEDURE — 84630 ASSAY OF ZINC: CPT

## 2024-01-01 PROCEDURE — 83935 ASSAY OF URINE OSMOLALITY: CPT

## 2024-01-01 PROCEDURE — 97165 OT EVAL LOW COMPLEX 30 MIN: CPT

## 2024-01-01 PROCEDURE — 93306 TTE W/DOPPLER COMPLETE: CPT

## 2024-01-01 PROCEDURE — 87206 SMEAR FLUORESCENT/ACID STAI: CPT

## 2024-01-01 PROCEDURE — 84484 ASSAY OF TROPONIN QUANT: CPT

## 2024-01-01 PROCEDURE — 32555 ASPIRATE PLEURA W/ IMAGING: CPT | Mod: LT

## 2024-01-01 PROCEDURE — C1894: CPT

## 2024-01-01 PROCEDURE — 83930 ASSAY OF BLOOD OSMOLALITY: CPT

## 2024-01-01 PROCEDURE — 85247 CLOT FACTOR VIII MULTIMETRIC: CPT

## 2024-01-01 PROCEDURE — 93970 EXTREMITY STUDY: CPT | Mod: 26

## 2024-01-01 PROCEDURE — 82550 ASSAY OF CK (CPK): CPT

## 2024-01-01 PROCEDURE — 96372 THER/PROPH/DIAG INJ SC/IM: CPT | Mod: XU

## 2024-01-01 PROCEDURE — 82746 ASSAY OF FOLIC ACID SERUM: CPT

## 2024-01-01 PROCEDURE — P9040: CPT

## 2024-01-01 PROCEDURE — 74177 CT ABD & PELVIS W/CONTRAST: CPT | Mod: 26

## 2024-01-01 PROCEDURE — 85018 HEMOGLOBIN: CPT

## 2024-01-01 PROCEDURE — 36246 INS CATH ABD/L-EXT ART 2ND: CPT | Mod: RT

## 2024-01-01 PROCEDURE — 86706 HEP B SURFACE ANTIBODY: CPT

## 2024-01-01 PROCEDURE — 75736 ARTERY X-RAYS PELVIS: CPT | Mod: 26,XU

## 2024-01-01 PROCEDURE — P9047: CPT

## 2024-01-01 PROCEDURE — 84295 ASSAY OF SERUM SODIUM: CPT

## 2024-01-01 PROCEDURE — 83986 ASSAY PH BODY FLUID NOS: CPT

## 2024-01-01 PROCEDURE — 84466 ASSAY OF TRANSFERRIN: CPT

## 2024-01-01 PROCEDURE — 47531 INJECTION FOR CHOLANGIOGRAM: CPT

## 2024-01-01 PROCEDURE — 82607 VITAMIN B-12: CPT

## 2024-01-01 PROCEDURE — C1887: CPT

## 2024-01-01 PROCEDURE — 85245 CLOT FACTOR VIII VW RISTOCTN: CPT

## 2024-01-01 PROCEDURE — 87640 STAPH A DNA AMP PROBE: CPT

## 2024-01-01 PROCEDURE — C1769: CPT

## 2024-01-01 PROCEDURE — 87899 AGENT NOS ASSAY W/OPTIC: CPT

## 2024-01-01 PROCEDURE — 33274 TCAT INSJ/RPL PERM LDLS PM: CPT | Mod: Q0

## 2024-01-01 PROCEDURE — 99238 HOSP IP/OBS DSCHRG MGMT 30/<: CPT

## 2024-01-01 PROCEDURE — 82652 VIT D 1 25-DIHYDROXY: CPT

## 2024-01-01 PROCEDURE — 82945 GLUCOSE OTHER FLUID: CPT

## 2024-01-01 PROCEDURE — 84255 ASSAY OF SELENIUM: CPT

## 2024-01-01 PROCEDURE — 74183 MRI ABD W/O CNTR FLWD CNTR: CPT | Mod: 26

## 2024-01-01 PROCEDURE — 93010 ELECTROCARDIOGRAM REPORT: CPT | Mod: 77

## 2024-01-01 PROCEDURE — 97161 PT EVAL LOW COMPLEX 20 MIN: CPT

## 2024-01-01 PROCEDURE — 36569 INSJ PICC 5 YR+ W/O IMAGING: CPT

## 2024-01-01 PROCEDURE — A9585: CPT

## 2024-01-01 PROCEDURE — 71045 X-RAY EXAM CHEST 1 VIEW: CPT | Mod: 26,XE

## 2024-01-01 PROCEDURE — 87507 IADNA-DNA/RNA PROBE TQ 12-25: CPT

## 2024-01-01 PROCEDURE — 74177 CT ABD & PELVIS W/CONTRAST: CPT | Mod: 26,MC

## 2024-01-01 PROCEDURE — 85611 PROTHROMBIN TEST: CPT

## 2024-01-01 PROCEDURE — P9100: CPT

## 2024-01-01 PROCEDURE — 83540 ASSAY OF IRON: CPT

## 2024-01-01 PROCEDURE — 99222 1ST HOSP IP/OBS MODERATE 55: CPT | Mod: GC

## 2024-01-01 PROCEDURE — P9045: CPT

## 2024-01-01 PROCEDURE — 85230 CLOT FACTOR VII PROCONVERTIN: CPT

## 2024-01-01 PROCEDURE — 82784 ASSAY IGA/IGD/IGG/IGM EACH: CPT

## 2024-01-01 PROCEDURE — 82728 ASSAY OF FERRITIN: CPT

## 2024-01-01 PROCEDURE — 83880 ASSAY OF NATRIURETIC PEPTIDE: CPT

## 2024-01-01 PROCEDURE — 71045 X-RAY EXAM CHEST 1 VIEW: CPT | Mod: 26,76

## 2024-01-01 PROCEDURE — 36010 PLACE CATHETER IN VEIN: CPT | Mod: RT

## 2024-01-01 PROCEDURE — 96374 THER/PROPH/DIAG INJ IV PUSH: CPT

## 2024-01-01 PROCEDURE — 71046 X-RAY EXAM CHEST 2 VIEWS: CPT | Mod: 26

## 2024-01-01 PROCEDURE — 99221 1ST HOSP IP/OBS SF/LOW 40: CPT

## 2024-01-01 PROCEDURE — 87015 SPECIMEN INFECT AGNT CONCNTJ: CPT

## 2024-01-01 PROCEDURE — 75710 ARTERY X-RAYS ARM/LEG: CPT

## 2024-01-01 PROCEDURE — 87116 MYCOBACTERIA CULTURE: CPT

## 2024-01-01 PROCEDURE — 97162 PT EVAL MOD COMPLEX 30 MIN: CPT

## 2024-01-01 PROCEDURE — 87075 CULTR BACTERIA EXCEPT BLOOD: CPT

## 2024-01-01 PROCEDURE — 36245 INS CATH ABD/L-EXT ART 1ST: CPT | Mod: XS,LT

## 2024-01-01 PROCEDURE — 82306 VITAMIN D 25 HYDROXY: CPT

## 2024-01-01 PROCEDURE — 75726 ARTERY X-RAYS ABDOMEN: CPT

## 2024-01-01 PROCEDURE — P9011: CPT

## 2024-01-01 PROCEDURE — 84166 PROTEIN E-PHORESIS/URINE/CSF: CPT

## 2024-01-01 PROCEDURE — 84132 ASSAY OF SERUM POTASSIUM: CPT

## 2024-01-01 PROCEDURE — 99285 EMERGENCY DEPT VISIT HI MDM: CPT | Mod: FS

## 2024-01-01 PROCEDURE — 82525 ASSAY OF COPPER: CPT

## 2024-01-01 PROCEDURE — 85246 CLOT FACTOR VIII VW ANTIGEN: CPT

## 2024-01-01 PROCEDURE — 84157 ASSAY OF PROTEIN OTHER: CPT

## 2024-01-01 PROCEDURE — 88112 CYTOPATH CELL ENHANCE TECH: CPT

## 2024-01-01 PROCEDURE — 75726 ARTERY X-RAYS ABDOMEN: CPT | Mod: 26

## 2024-01-01 PROCEDURE — C9399: CPT

## 2024-01-01 PROCEDURE — 36248 INS CATH ABD/L-EXT ART ADDL: CPT

## 2024-01-01 PROCEDURE — 71045 X-RAY EXAM CHEST 1 VIEW: CPT | Mod: 26,77

## 2024-01-01 PROCEDURE — 86480 TB TEST CELL IMMUN MEASURE: CPT

## 2024-01-01 PROCEDURE — 99232 SBSQ HOSP IP/OBS MODERATE 35: CPT | Mod: FS

## 2024-01-01 PROCEDURE — 85290 CLOT FACTOR XIII FIBRIN STAB: CPT

## 2024-01-01 PROCEDURE — 85670 THROMBIN TIME PLASMA: CPT

## 2024-01-01 PROCEDURE — 36556 INSERT NON-TUNNEL CV CATH: CPT | Mod: GC

## 2024-01-01 PROCEDURE — 84145 PROCALCITONIN (PCT): CPT

## 2024-01-01 PROCEDURE — 75774 ARTERY X-RAY EACH VESSEL: CPT | Mod: 26,59

## 2024-01-01 PROCEDURE — 96360 HYDRATION IV INFUSION INIT: CPT

## 2024-01-01 PROCEDURE — 36248 INS CATH ABD/L-EXT ART ADDL: CPT | Mod: 59

## 2024-01-01 PROCEDURE — 86985 SPLIT BLOOD OR PRODUCTS: CPT

## 2024-01-01 PROCEDURE — 36000 PLACE NEEDLE IN VEIN: CPT | Mod: 59

## 2024-01-01 PROCEDURE — 99233 SBSQ HOSP IP/OBS HIGH 50: CPT | Mod: 57

## 2024-01-01 PROCEDURE — C8929: CPT

## 2024-01-01 PROCEDURE — 82330 ASSAY OF CALCIUM: CPT

## 2024-01-01 PROCEDURE — 84155 ASSAY OF PROTEIN SERUM: CPT

## 2024-01-01 DEVICE — CLIP HEMO INSTINCT PLUS ENDOSCOPIC: Type: IMPLANTABLE DEVICE | Status: FUNCTIONAL

## 2024-01-01 RX ORDER — LANOLIN ALCOHOL/MO/W.PET/CERES
5 CREAM (GRAM) TOPICAL AT BEDTIME
Refills: 0 | Status: DISCONTINUED | OUTPATIENT
Start: 2024-01-01 | End: 2024-01-01

## 2024-01-01 RX ORDER — BACITRACIN ZINC 500 UNIT/G
1 OINTMENT IN PACKET (EA) TOPICAL
Qty: 0 | Refills: 0 | DISCHARGE
Start: 2024-01-01

## 2024-01-01 RX ORDER — ELECTROLYTE SOLUTION,INJ
1 VIAL (ML) INTRAVENOUS
Refills: 0 | Status: DISCONTINUED | OUTPATIENT
Start: 2024-01-01 | End: 2024-01-01

## 2024-01-01 RX ORDER — INSULIN HUMAN 100 [IU]/ML
5 INJECTION, SOLUTION SUBCUTANEOUS ONCE
Refills: 0 | Status: COMPLETED | OUTPATIENT
Start: 2024-01-01 | End: 2024-01-01

## 2024-01-01 RX ORDER — CEFEPIME 1 G/1
INJECTION, POWDER, FOR SOLUTION INTRAMUSCULAR; INTRAVENOUS
Refills: 0 | Status: DISCONTINUED | OUTPATIENT
Start: 2024-01-01 | End: 2024-01-01

## 2024-01-01 RX ORDER — I.V. FAT EMULSION 20 G/100ML
0.52 EMULSION INTRAVENOUS
Qty: 50 | Refills: 0 | Status: DISCONTINUED | OUTPATIENT
Start: 2024-01-01 | End: 2024-01-01

## 2024-01-01 RX ORDER — VENLAFAXINE HCL 75 MG
2 CAPSULE, EXT RELEASE 24 HR ORAL
Qty: 0 | Refills: 0 | DISCHARGE
Start: 2024-01-01

## 2024-01-01 RX ORDER — CEFAZOLIN SODIUM 1 G
2000 VIAL (EA) INJECTION ONCE
Refills: 0 | Status: COMPLETED | OUTPATIENT
Start: 2024-01-01 | End: 2024-01-01

## 2024-01-01 RX ORDER — NYSTATIN CREAM 100000 [USP'U]/G
1 CREAM TOPICAL
Qty: 0 | Refills: 0 | DISCHARGE
Start: 2024-01-01

## 2024-01-01 RX ORDER — LANOLIN ALCOHOL/MO/W.PET/CERES
5 CREAM (GRAM) TOPICAL ONCE
Refills: 0 | Status: COMPLETED | OUTPATIENT
Start: 2024-01-01 | End: 2024-01-01

## 2024-01-01 RX ORDER — CEFEPIME 1 G/1
2000 INJECTION, POWDER, FOR SOLUTION INTRAMUSCULAR; INTRAVENOUS EVERY 12 HOURS
Refills: 0 | Status: DISCONTINUED | OUTPATIENT
Start: 2024-01-01 | End: 2024-01-01

## 2024-01-01 RX ORDER — CEFEPIME 1 G/1
2000 INJECTION, POWDER, FOR SOLUTION INTRAMUSCULAR; INTRAVENOUS ONCE
Refills: 0 | Status: COMPLETED | OUTPATIENT
Start: 2024-01-01 | End: 2024-01-01

## 2024-01-01 RX ORDER — METOPROLOL TARTRATE 50 MG
7.5 TABLET ORAL EVERY 4 HOURS
Refills: 0 | Status: DISCONTINUED | OUTPATIENT
Start: 2024-01-01 | End: 2024-01-01

## 2024-01-01 RX ORDER — CEFAZOLIN SODIUM 1 G
2000 VIAL (EA) INJECTION EVERY 8 HOURS
Refills: 0 | Status: DISCONTINUED | OUTPATIENT
Start: 2024-01-01 | End: 2024-01-01

## 2024-01-01 RX ORDER — DEXTROSE 50 % IN WATER 50 %
12.5 SYRINGE (ML) INTRAVENOUS ONCE
Refills: 0 | Status: DISCONTINUED | OUTPATIENT
Start: 2024-01-01 | End: 2024-01-01

## 2024-01-01 RX ORDER — POTASSIUM CHLORIDE 20 MEQ
20 PACKET (EA) ORAL
Refills: 0 | Status: COMPLETED | OUTPATIENT
Start: 2024-01-01 | End: 2024-01-01

## 2024-01-01 RX ORDER — I.V. FAT EMULSION 20 G/100ML
0.54 EMULSION INTRAVENOUS
Qty: 50 | Refills: 0 | Status: DISCONTINUED | OUTPATIENT
Start: 2024-01-01 | End: 2024-01-01

## 2024-01-01 RX ORDER — SODIUM CHLORIDE 9 MG/ML
500 INJECTION, SOLUTION INTRAVENOUS ONCE
Refills: 0 | Status: COMPLETED | OUTPATIENT
Start: 2024-01-01 | End: 2024-01-01

## 2024-01-01 RX ORDER — ACETAZOLAMIDE 250 MG/1
250 TABLET ORAL EVERY 12 HOURS
Refills: 0 | Status: DISCONTINUED | OUTPATIENT
Start: 2024-01-01 | End: 2024-01-01

## 2024-01-01 RX ORDER — ONDANSETRON 8 MG/1
4 TABLET, FILM COATED ORAL ONCE
Refills: 0 | Status: COMPLETED | OUTPATIENT
Start: 2024-01-01 | End: 2024-01-01

## 2024-01-01 RX ORDER — ELECTROLYTE SOLUTION,INJ
1 VIAL (ML) INTRAVENOUS
Qty: 90 | Refills: 0
Start: 2024-01-01 | End: 2024-01-01

## 2024-01-01 RX ORDER — METOPROLOL TARTRATE 50 MG
50 TABLET ORAL EVERY 8 HOURS
Refills: 0 | Status: DISCONTINUED | OUTPATIENT
Start: 2024-01-01 | End: 2024-01-01

## 2024-01-01 RX ORDER — FUROSEMIDE 40 MG
20 TABLET ORAL ONCE
Refills: 0 | Status: COMPLETED | OUTPATIENT
Start: 2024-01-01 | End: 2024-01-01

## 2024-01-01 RX ORDER — CALCIUM GLUCONATE 100 MG/ML
1 VIAL (ML) INTRAVENOUS ONCE
Refills: 0 | Status: COMPLETED | OUTPATIENT
Start: 2024-01-01 | End: 2024-01-01

## 2024-01-01 RX ORDER — I.V. FAT EMULSION 20 G/100ML
0.51 EMULSION INTRAVENOUS
Qty: 50 | Refills: 0 | Status: DISCONTINUED | OUTPATIENT
Start: 2024-01-01 | End: 2024-01-01

## 2024-01-01 RX ORDER — TIMOLOL 0.5 %
1 DROPS OPHTHALMIC (EYE) DAILY
Refills: 0 | Status: DISCONTINUED | OUTPATIENT
Start: 2024-01-01 | End: 2024-01-01

## 2024-01-01 RX ORDER — DEXTROSE 50 % IN WATER 50 %
25 SYRINGE (ML) INTRAVENOUS ONCE
Refills: 0 | Status: DISCONTINUED | OUTPATIENT
Start: 2024-01-01 | End: 2024-01-01

## 2024-01-01 RX ORDER — I.V. FAT EMULSION 20 G/100ML
0.53 EMULSION INTRAVENOUS
Qty: 50 | Refills: 0 | Status: DISCONTINUED | OUTPATIENT
Start: 2024-01-01 | End: 2024-01-01

## 2024-01-01 RX ORDER — POTASSIUM CHLORIDE 20 MEQ
20 PACKET (EA) ORAL ONCE
Refills: 0 | Status: COMPLETED | OUTPATIENT
Start: 2024-01-01 | End: 2024-01-01

## 2024-01-01 RX ORDER — CHLORHEXIDINE GLUCONATE 213 G/1000ML
1 SOLUTION TOPICAL
Refills: 0 | Status: DISCONTINUED | OUTPATIENT
Start: 2024-01-01 | End: 2024-01-01

## 2024-01-01 RX ORDER — HYDRALAZINE HCL 50 MG
10 TABLET ORAL EVERY 6 HOURS
Refills: 0 | Status: DISCONTINUED | OUTPATIENT
Start: 2024-01-01 | End: 2024-01-01

## 2024-01-01 RX ORDER — RACEPINEPHRINE HCL 2.25 %
1 SOLUTION, NON-ORAL TOPICAL ONCE
Refills: 0 | Status: DISCONTINUED | OUTPATIENT
Start: 2024-01-01 | End: 2024-01-01

## 2024-01-01 RX ORDER — FUROSEMIDE 40 MG
60 TABLET ORAL ONCE
Refills: 0 | Status: COMPLETED | OUTPATIENT
Start: 2024-01-01 | End: 2024-01-01

## 2024-01-01 RX ORDER — METOPROLOL TARTRATE 50 MG
50 TABLET ORAL DAILY
Refills: 0 | Status: DISCONTINUED | OUTPATIENT
Start: 2024-01-01 | End: 2024-01-01

## 2024-01-01 RX ORDER — IPRATROPIUM/ALBUTEROL SULFATE 18-103MCG
3 AEROSOL WITH ADAPTER (GRAM) INHALATION EVERY 6 HOURS
Refills: 0 | Status: DISCONTINUED | OUTPATIENT
Start: 2024-01-01 | End: 2024-01-01

## 2024-01-01 RX ORDER — ERYTHROPOIETIN 10000 [IU]/ML
10000 INJECTION, SOLUTION INTRAVENOUS; SUBCUTANEOUS
Refills: 0 | Status: DISCONTINUED | OUTPATIENT
Start: 2024-01-01 | End: 2024-01-01

## 2024-01-01 RX ORDER — IRON SUCROSE 20 MG/ML
100 INJECTION, SOLUTION INTRAVENOUS ONCE
Refills: 0 | Status: COMPLETED | OUTPATIENT
Start: 2024-01-01 | End: 2024-01-01

## 2024-01-01 RX ORDER — CHOLESTYRAMINE 4 G/9G
4000 POWDER, FOR SUSPENSION ORAL
Qty: 0 | Refills: 0 | DISCHARGE
Start: 2024-01-01

## 2024-01-01 RX ORDER — POTASSIUM CHLORIDE 20 MEQ
10 PACKET (EA) ORAL
Refills: 0 | Status: COMPLETED | OUTPATIENT
Start: 2024-01-01 | End: 2024-01-01

## 2024-01-01 RX ORDER — VENLAFAXINE HCL 75 MG
2 CAPSULE, EXT RELEASE 24 HR ORAL
Refills: 0 | DISCHARGE

## 2024-01-01 RX ORDER — METOPROLOL TARTRATE 50 MG
5 TABLET ORAL ONCE
Refills: 0 | Status: DISCONTINUED | OUTPATIENT
Start: 2024-01-01 | End: 2024-01-01

## 2024-01-01 RX ORDER — I.V. FAT EMULSION 20 G/100ML
50 EMULSION INTRAVENOUS
Qty: 23 | Refills: 0
Start: 2024-01-01 | End: 2024-07-26

## 2024-01-01 RX ORDER — OCTREOTIDE ACETATE 200 UG/ML
50 INJECTION, SOLUTION INTRAVENOUS; SUBCUTANEOUS
Qty: 500 | Refills: 0 | Status: COMPLETED | OUTPATIENT
Start: 2024-01-01 | End: 2024-01-01

## 2024-01-01 RX ORDER — BACITRACIN ZINC 500 UNIT/G
1 OINTMENT IN PACKET (EA) TOPICAL ONCE
Refills: 0 | Status: COMPLETED | OUTPATIENT
Start: 2024-01-01 | End: 2024-01-01

## 2024-01-01 RX ORDER — POTASSIUM PHOSPHATE, MONOBASIC POTASSIUM PHOSPHATE, DIBASIC 236; 224 MG/ML; MG/ML
21 INJECTION, SOLUTION INTRAVENOUS ONCE
Refills: 0 | Status: COMPLETED | OUTPATIENT
Start: 2024-01-01 | End: 2024-01-01

## 2024-01-01 RX ORDER — SODIUM CHLORIDE 9 MG/ML
1000 INJECTION, SOLUTION INTRAVENOUS
Refills: 0 | Status: DISCONTINUED | OUTPATIENT
Start: 2024-01-01 | End: 2024-01-01

## 2024-01-01 RX ORDER — LIPASE/PROTEASE/AMYLASE 16-48-48K
1 CAPSULE,DELAYED RELEASE (ENTERIC COATED) ORAL
Qty: 90 | Refills: 2
Start: 2024-01-01 | End: 2024-01-01

## 2024-01-01 RX ORDER — METOPROLOL TARTRATE 50 MG
50 TABLET ORAL EVERY 12 HOURS
Refills: 0 | Status: DISCONTINUED | OUTPATIENT
Start: 2024-01-01 | End: 2024-01-01

## 2024-01-01 RX ORDER — ALBUMIN HUMAN 25 %
50 VIAL (ML) INTRAVENOUS EVERY 8 HOURS
Refills: 0 | Status: COMPLETED | OUTPATIENT
Start: 2024-01-01 | End: 2024-01-01

## 2024-01-01 RX ORDER — LIPASE/PROTEASE/AMYLASE 16-48-48K
1 CAPSULE,DELAYED RELEASE (ENTERIC COATED) ORAL
Refills: 0 | Status: DISCONTINUED | OUTPATIENT
Start: 2024-01-01 | End: 2024-01-01

## 2024-01-01 RX ORDER — METOPROLOL TARTRATE 50 MG
10 TABLET ORAL EVERY 4 HOURS
Refills: 0 | Status: DISCONTINUED | OUTPATIENT
Start: 2024-01-01 | End: 2024-01-01

## 2024-01-01 RX ORDER — PHYTONADIONE (VIT K1) 5 MG
10 TABLET ORAL ONCE
Refills: 0 | Status: COMPLETED | OUTPATIENT
Start: 2024-01-01 | End: 2024-01-01

## 2024-01-01 RX ORDER — AMIODARONE HYDROCHLORIDE 400 MG/1
1 TABLET ORAL
Refills: 0 | DISCHARGE

## 2024-01-01 RX ORDER — NYSTATIN CREAM 100000 [USP'U]/G
1 CREAM TOPICAL
Refills: 0 | Status: DISCONTINUED | OUTPATIENT
Start: 2024-01-01 | End: 2024-01-01

## 2024-01-01 RX ORDER — URSODIOL 250 MG/1
1 TABLET, FILM COATED ORAL
Qty: 0 | Refills: 0 | DISCHARGE
Start: 2024-01-01

## 2024-01-01 RX ORDER — HYDROCORTISONE 20 MG
100 TABLET ORAL EVERY 12 HOURS
Refills: 0 | Status: DISCONTINUED | OUTPATIENT
Start: 2024-01-01 | End: 2024-01-01

## 2024-01-01 RX ORDER — DEXTROSE 10 % IN WATER 10 %
125 INTRAVENOUS SOLUTION INTRAVENOUS ONCE
Refills: 0 | Status: DISCONTINUED | OUTPATIENT
Start: 2024-01-01 | End: 2024-01-01

## 2024-01-01 RX ORDER — EPINEPHRINE 0.3 MG/.3ML
1 INJECTION INTRAMUSCULAR; SUBCUTANEOUS ONCE
Refills: 0 | Status: COMPLETED | OUTPATIENT
Start: 2024-01-01 | End: 2024-01-01

## 2024-01-01 RX ORDER — CHOLESTYRAMINE 4 G/9G
4 POWDER, FOR SUSPENSION ORAL
Refills: 0 | DISCHARGE

## 2024-01-01 RX ORDER — ATORVASTATIN CALCIUM 80 MG/1
20 TABLET, FILM COATED ORAL AT BEDTIME
Refills: 0 | Status: DISCONTINUED | OUTPATIENT
Start: 2024-01-01 | End: 2024-01-01

## 2024-01-01 RX ORDER — HYDROMORPHONE HYDROCHLORIDE 2 MG/ML
0.25 INJECTION INTRAMUSCULAR; INTRAVENOUS; SUBCUTANEOUS ONCE
Refills: 0 | Status: DISCONTINUED | OUTPATIENT
Start: 2024-01-01 | End: 2024-01-01

## 2024-01-01 RX ORDER — NOREPINEPHRINE BITARTRATE/D5W 8 MG/250ML
0.05 PLASTIC BAG, INJECTION (ML) INTRAVENOUS
Qty: 8 | Refills: 0 | Status: DISCONTINUED | OUTPATIENT
Start: 2024-01-01 | End: 2024-01-01

## 2024-01-01 RX ORDER — CHLORHEXIDINE GLUCONATE 213 G/1000ML
1 SOLUTION TOPICAL
Qty: 0 | Refills: 0 | DISCHARGE
Start: 2024-01-01

## 2024-01-01 RX ORDER — LANOLIN ALCOHOL/MO/W.PET/CERES
1 CREAM (GRAM) TOPICAL
Qty: 0 | Refills: 0 | DISCHARGE
Start: 2024-01-01

## 2024-01-01 RX ORDER — INFLUENZA VIRUS VACCINE 15; 15; 15; 15 UG/.5ML; UG/.5ML; UG/.5ML; UG/.5ML
0.5 SUSPENSION INTRAMUSCULAR ONCE
Refills: 0 | Status: DISCONTINUED | OUTPATIENT
Start: 2024-01-01 | End: 2024-01-01

## 2024-01-01 RX ORDER — MAGNESIUM SULFATE 500 MG/ML
1 VIAL (ML) INJECTION ONCE
Refills: 0 | Status: COMPLETED | OUTPATIENT
Start: 2024-01-01 | End: 2024-01-01

## 2024-01-01 RX ORDER — FUROSEMIDE 40 MG
40 TABLET ORAL ONCE
Refills: 0 | Status: COMPLETED | OUTPATIENT
Start: 2024-01-01 | End: 2024-01-01

## 2024-01-01 RX ORDER — DEXTROSE 10 % IN WATER 10 %
1000 INTRAVENOUS SOLUTION INTRAVENOUS
Refills: 0 | Status: DISCONTINUED | OUTPATIENT
Start: 2024-01-01 | End: 2024-01-01

## 2024-01-01 RX ORDER — METOPROLOL TARTRATE 50 MG
25 TABLET ORAL ONCE
Refills: 0 | Status: COMPLETED | OUTPATIENT
Start: 2024-01-01 | End: 2024-01-01

## 2024-01-01 RX ORDER — LEVOTHYROXINE SODIUM 125 MCG
50 TABLET ORAL EVERY 24 HOURS
Refills: 0 | Status: DISCONTINUED | OUTPATIENT
Start: 2024-01-01 | End: 2024-01-01

## 2024-01-01 RX ORDER — OCTREOTIDE ACETATE 200 UG/ML
300 INJECTION, SOLUTION INTRAVENOUS; SUBCUTANEOUS
Qty: 1 | Refills: 0
Start: 2024-01-01 | End: 2024-01-01

## 2024-01-01 RX ORDER — HYDROMORPHONE HYDROCHLORIDE 2 MG/ML
0.5 INJECTION INTRAMUSCULAR; INTRAVENOUS; SUBCUTANEOUS
Refills: 0 | Status: DISCONTINUED | OUTPATIENT
Start: 2024-01-01 | End: 2024-01-01

## 2024-01-01 RX ORDER — DEXAMETHASONE 0.5 MG/5ML
6 ELIXIR ORAL EVERY 24 HOURS
Refills: 0 | Status: DISCONTINUED | OUTPATIENT
Start: 2024-01-01 | End: 2024-01-01

## 2024-01-01 RX ORDER — METOPROLOL TARTRATE 50 MG
7.5 TABLET ORAL EVERY 6 HOURS
Refills: 0 | Status: DISCONTINUED | OUTPATIENT
Start: 2024-01-01 | End: 2024-01-01

## 2024-01-01 RX ORDER — METOPROLOL TARTRATE 50 MG
50 TABLET ORAL
Refills: 0 | Status: DISCONTINUED | OUTPATIENT
Start: 2024-01-01 | End: 2024-01-01

## 2024-01-01 RX ORDER — SODIUM CHLORIDE 9 MG/ML
1000 INJECTION, SOLUTION INTRAVENOUS ONCE
Refills: 0 | Status: COMPLETED | OUTPATIENT
Start: 2024-01-01 | End: 2024-01-01

## 2024-01-01 RX ORDER — INSULIN LISPRO 100/ML
VIAL (ML) SUBCUTANEOUS AT BEDTIME
Refills: 0 | Status: DISCONTINUED | OUTPATIENT
Start: 2024-01-01 | End: 2024-01-01

## 2024-01-01 RX ORDER — SODIUM CHLORIDE 9 MG/ML
10 INJECTION INTRAMUSCULAR; INTRAVENOUS; SUBCUTANEOUS
Refills: 0 | Status: DISCONTINUED | OUTPATIENT
Start: 2024-01-01 | End: 2024-01-01

## 2024-01-01 RX ORDER — I.V. FAT EMULSION 20 G/100ML
0.62 EMULSION INTRAVENOUS
Qty: 50 | Refills: 0 | Status: DISCONTINUED | OUTPATIENT
Start: 2024-01-01 | End: 2024-01-01

## 2024-01-01 RX ORDER — ENOXAPARIN SODIUM 100 MG/ML
40 INJECTION SUBCUTANEOUS ONCE
Refills: 0 | Status: COMPLETED | OUTPATIENT
Start: 2024-01-01 | End: 2024-01-01

## 2024-01-01 RX ORDER — ALLOPURINOL 300 MG
1 TABLET ORAL
Qty: 0 | Refills: 0 | DISCHARGE

## 2024-01-01 RX ORDER — HYDROMORPHONE HYDROCHLORIDE 2 MG/ML
1 INJECTION INTRAMUSCULAR; INTRAVENOUS; SUBCUTANEOUS
Refills: 0 | Status: DISCONTINUED | OUTPATIENT
Start: 2024-01-01 | End: 2024-01-01

## 2024-01-01 RX ORDER — SODIUM ZIRCONIUM CYCLOSILICATE 10 G/10G
10 POWDER, FOR SUSPENSION ORAL ONCE
Refills: 0 | Status: COMPLETED | OUTPATIENT
Start: 2024-01-01 | End: 2024-01-01

## 2024-01-01 RX ORDER — URSODIOL 250 MG/1
300 TABLET, FILM COATED ORAL EVERY 8 HOURS
Refills: 0 | Status: DISCONTINUED | OUTPATIENT
Start: 2024-01-01 | End: 2024-01-01

## 2024-01-01 RX ORDER — VENLAFAXINE HCL 75 MG
150 CAPSULE, EXT RELEASE 24 HR ORAL EVERY 24 HOURS
Refills: 0 | Status: DISCONTINUED | OUTPATIENT
Start: 2024-01-01 | End: 2024-01-01

## 2024-01-01 RX ORDER — METOPROLOL TARTRATE 50 MG
1 TABLET ORAL
Refills: 0
Start: 2024-01-01

## 2024-01-01 RX ORDER — BACITRACIN ZINC 500 UNIT/G
1 OINTMENT IN PACKET (EA) TOPICAL
Refills: 0 | Status: DISCONTINUED | OUTPATIENT
Start: 2024-01-01 | End: 2024-01-01

## 2024-01-01 RX ORDER — GLUCAGON INJECTION, SOLUTION 0.5 MG/.1ML
1 INJECTION, SOLUTION SUBCUTANEOUS ONCE
Refills: 0 | Status: DISCONTINUED | OUTPATIENT
Start: 2024-01-01 | End: 2024-01-01

## 2024-01-01 RX ORDER — BUMETANIDE 0.25 MG/ML
1 INJECTION INTRAMUSCULAR; INTRAVENOUS ONCE
Refills: 0 | Status: DISCONTINUED | OUTPATIENT
Start: 2024-01-01 | End: 2024-01-01

## 2024-01-01 RX ORDER — METOPROLOL TARTRATE 50 MG
25 TABLET ORAL EVERY 12 HOURS
Refills: 0 | Status: DISCONTINUED | OUTPATIENT
Start: 2024-01-01 | End: 2024-01-01

## 2024-01-01 RX ORDER — SIMETHICONE 80 MG/1
80 TABLET, CHEWABLE ORAL EVERY 12 HOURS
Refills: 0 | Status: DISCONTINUED | OUTPATIENT
Start: 2024-01-01 | End: 2024-01-01

## 2024-01-01 RX ORDER — SODIUM CHLORIDE 9 MG/ML
1000 INJECTION INTRAMUSCULAR; INTRAVENOUS; SUBCUTANEOUS ONCE
Refills: 0 | Status: COMPLETED | OUTPATIENT
Start: 2024-01-01 | End: 2024-01-01

## 2024-01-01 RX ORDER — HYDROCORTISONE 20 MG
60 TABLET ORAL
Qty: 0 | Refills: 0 | DISCHARGE
Start: 2024-01-01

## 2024-01-01 RX ORDER — MIDODRINE HYDROCHLORIDE 2.5 MG/1
5 TABLET ORAL EVERY 8 HOURS
Refills: 0 | Status: DISCONTINUED | OUTPATIENT
Start: 2024-01-01 | End: 2024-01-01

## 2024-01-01 RX ORDER — ATORVASTATIN CALCIUM 80 MG/1
1 TABLET, FILM COATED ORAL
Qty: 0 | Refills: 0 | DISCHARGE
Start: 2024-01-01

## 2024-01-01 RX ORDER — POTASSIUM CHLORIDE 20 MEQ
20 PACKET (EA) ORAL
Refills: 0 | Status: DISCONTINUED | OUTPATIENT
Start: 2024-01-01 | End: 2024-01-01

## 2024-01-01 RX ORDER — HYDROMORPHONE HYDROCHLORIDE 2 MG/ML
0.5 INJECTION INTRAMUSCULAR; INTRAVENOUS; SUBCUTANEOUS EVERY 4 HOURS
Refills: 0 | Status: DISCONTINUED | OUTPATIENT
Start: 2024-01-01 | End: 2024-01-01

## 2024-01-01 RX ORDER — ACETAZOLAMIDE 250 MG/1
250 TABLET ORAL ONCE
Refills: 0 | Status: COMPLETED | OUTPATIENT
Start: 2024-01-01 | End: 2024-01-01

## 2024-01-01 RX ORDER — LEVOTHYROXINE SODIUM 125 MCG
50 TABLET ORAL DAILY
Refills: 0 | Status: DISCONTINUED | OUTPATIENT
Start: 2024-01-01 | End: 2024-01-01

## 2024-01-01 RX ORDER — POTASSIUM PHOSPHATE, MONOBASIC POTASSIUM PHOSPHATE, DIBASIC 236; 224 MG/ML; MG/ML
15 INJECTION, SOLUTION INTRAVENOUS ONCE
Refills: 0 | Status: COMPLETED | OUTPATIENT
Start: 2024-01-01 | End: 2024-01-01

## 2024-01-01 RX ORDER — ENOXAPARIN SODIUM 100 MG/ML
40 INJECTION SUBCUTANEOUS EVERY 24 HOURS
Refills: 0 | Status: DISCONTINUED | OUTPATIENT
Start: 2024-01-01 | End: 2024-01-01

## 2024-01-01 RX ORDER — DEXTROSE 50 % IN WATER 50 %
15 SYRINGE (ML) INTRAVENOUS ONCE
Refills: 0 | Status: DISCONTINUED | OUTPATIENT
Start: 2024-01-01 | End: 2024-01-01

## 2024-01-01 RX ORDER — EPINEPHRINE 0.3 MG/.3ML
0.3 INJECTION INTRAMUSCULAR; SUBCUTANEOUS ONCE
Refills: 0 | Status: DISCONTINUED | OUTPATIENT
Start: 2024-01-01 | End: 2024-01-01

## 2024-01-01 RX ORDER — I.V. FAT EMULSION 20 G/100ML
0.8 EMULSION INTRAVENOUS
Qty: 50 | Refills: 0 | Status: DISCONTINUED | OUTPATIENT
Start: 2024-01-01 | End: 2024-01-01

## 2024-01-01 RX ORDER — NEOMYCIN SULFATE 500 MG/1
1000 TABLET ORAL ONCE
Refills: 0 | Status: DISCONTINUED | OUTPATIENT
Start: 2024-01-01 | End: 2024-01-01

## 2024-01-01 RX ORDER — INSULIN LISPRO 100/ML
VIAL (ML) SUBCUTANEOUS EVERY 6 HOURS
Refills: 0 | Status: DISCONTINUED | OUTPATIENT
Start: 2024-01-01 | End: 2024-01-01

## 2024-01-01 RX ORDER — I.V. FAT EMULSION 20 G/100ML
0.51 EMULSION INTRAVENOUS
Qty: 49.73 | Refills: 0 | Status: DISCONTINUED | OUTPATIENT
Start: 2024-01-01 | End: 2024-01-01

## 2024-01-01 RX ORDER — ROSUVASTATIN CALCIUM 5 MG/1
1 TABLET ORAL
Qty: 0 | Refills: 0 | DISCHARGE

## 2024-01-01 RX ORDER — CARVEDILOL PHOSPHATE 80 MG/1
3.12 CAPSULE, EXTENDED RELEASE ORAL EVERY 12 HOURS
Refills: 0 | Status: DISCONTINUED | OUTPATIENT
Start: 2024-01-01 | End: 2024-01-01

## 2024-01-01 RX ORDER — PANTOPRAZOLE SODIUM 20 MG/1
40 TABLET, DELAYED RELEASE ORAL DAILY
Refills: 0 | Status: DISCONTINUED | OUTPATIENT
Start: 2024-01-01 | End: 2024-01-01

## 2024-01-01 RX ORDER — VENLAFAXINE HCL 75 MG
300 CAPSULE, EXT RELEASE 24 HR ORAL EVERY 24 HOURS
Refills: 0 | Status: DISCONTINUED | OUTPATIENT
Start: 2024-01-01 | End: 2024-01-01

## 2024-01-01 RX ORDER — INSULIN LISPRO 100/ML
VIAL (ML) SUBCUTANEOUS
Refills: 0 | Status: DISCONTINUED | OUTPATIENT
Start: 2024-01-01 | End: 2024-01-01

## 2024-01-01 RX ORDER — ROSUVASTATIN CALCIUM 5 MG/1
1 TABLET ORAL
Refills: 0
Start: 2024-01-01

## 2024-01-01 RX ORDER — ACETAMINOPHEN 500 MG
650 TABLET ORAL EVERY 6 HOURS
Refills: 0 | Status: DISCONTINUED | OUTPATIENT
Start: 2024-01-01 | End: 2024-01-01

## 2024-01-01 RX ORDER — ZOLPIDEM TARTRATE 10 MG/1
1 TABLET ORAL
Qty: 0 | Refills: 0 | DISCHARGE
Start: 2024-01-01

## 2024-01-01 RX ORDER — AMIODARONE HYDROCHLORIDE 400 MG/1
1 TABLET ORAL
Refills: 0
Start: 2024-01-01

## 2024-01-01 RX ORDER — SODIUM CHLORIDE 9 MG/ML
500 INJECTION INTRAMUSCULAR; INTRAVENOUS; SUBCUTANEOUS ONCE
Refills: 0 | Status: COMPLETED | OUTPATIENT
Start: 2024-01-01 | End: 2024-01-01

## 2024-01-01 RX ORDER — ROBINUL 0.2 MG/ML
0.4 INJECTION INTRAMUSCULAR; INTRAVENOUS EVERY 4 HOURS
Refills: 0 | Status: DISCONTINUED | OUTPATIENT
Start: 2024-01-01 | End: 2024-01-01

## 2024-01-01 RX ORDER — HEPARIN SODIUM 5000 [USP'U]/ML
5000 INJECTION INTRAVENOUS; SUBCUTANEOUS EVERY 12 HOURS
Refills: 0 | Status: DISCONTINUED | OUTPATIENT
Start: 2024-01-01 | End: 2024-01-01

## 2024-01-01 RX ORDER — SODIUM CHLORIDE 9 MG/ML
1000 INJECTION INTRAMUSCULAR; INTRAVENOUS; SUBCUTANEOUS
Refills: 0 | Status: DISCONTINUED | OUTPATIENT
Start: 2024-01-01 | End: 2024-01-01

## 2024-01-01 RX ORDER — CHOLESTYRAMINE 4 G/9G
4 POWDER, FOR SUSPENSION ORAL EVERY 24 HOURS
Refills: 0 | Status: DISCONTINUED | OUTPATIENT
Start: 2024-01-01 | End: 2024-01-01

## 2024-01-01 RX ORDER — HYDROMORPHONE HYDROCHLORIDE 2 MG/ML
0.5 INJECTION INTRAMUSCULAR; INTRAVENOUS; SUBCUTANEOUS ONCE
Refills: 0 | Status: DISCONTINUED | OUTPATIENT
Start: 2024-01-01 | End: 2024-01-01

## 2024-01-01 RX ORDER — REMDESIVIR 5 MG/ML
INJECTION INTRAVENOUS
Refills: 0 | Status: COMPLETED | OUTPATIENT
Start: 2024-01-01 | End: 2024-01-01

## 2024-01-01 RX ORDER — ALBUMIN HUMAN 25 %
50 VIAL (ML) INTRAVENOUS EVERY 8 HOURS
Refills: 0 | Status: DISCONTINUED | OUTPATIENT
Start: 2024-01-01 | End: 2024-01-01

## 2024-01-01 RX ORDER — SPIRONOLACTONE 25 MG/1
25 TABLET, FILM COATED ORAL ONCE
Refills: 0 | Status: COMPLETED | OUTPATIENT
Start: 2024-01-01 | End: 2024-01-01

## 2024-01-01 RX ORDER — FUROSEMIDE 40 MG
40 TABLET ORAL DAILY
Refills: 0 | Status: DISCONTINUED | OUTPATIENT
Start: 2024-01-01 | End: 2024-01-01

## 2024-01-01 RX ORDER — HYDROMORPHONE HYDROCHLORIDE 2 MG/ML
0.2 INJECTION INTRAMUSCULAR; INTRAVENOUS; SUBCUTANEOUS ONCE
Refills: 0 | Status: DISCONTINUED | OUTPATIENT
Start: 2024-01-01 | End: 2024-01-01

## 2024-01-01 RX ORDER — CEFEPIME 1 G/1
1000 INJECTION, POWDER, FOR SOLUTION INTRAMUSCULAR; INTRAVENOUS EVERY 12 HOURS
Refills: 0 | Status: DISCONTINUED | OUTPATIENT
Start: 2024-01-01 | End: 2024-01-01

## 2024-01-01 RX ORDER — ZOLPIDEM TARTRATE 10 MG/1
5 TABLET ORAL AT BEDTIME
Refills: 0 | Status: DISCONTINUED | OUTPATIENT
Start: 2024-01-01 | End: 2024-01-01

## 2024-01-01 RX ORDER — CEFEPIME 1 G/1
2000 INJECTION, POWDER, FOR SOLUTION INTRAMUSCULAR; INTRAVENOUS EVERY 8 HOURS
Refills: 0 | Status: DISCONTINUED | OUTPATIENT
Start: 2024-01-01 | End: 2024-01-01

## 2024-01-01 RX ORDER — LANOLIN ALCOHOL/MO/W.PET/CERES
10 CREAM (GRAM) TOPICAL ONCE
Refills: 0 | Status: COMPLETED | OUTPATIENT
Start: 2024-01-01 | End: 2024-01-01

## 2024-01-01 RX ORDER — ALBUTEROL 90 UG/1
2.5 AEROSOL, METERED ORAL ONCE
Refills: 0 | Status: COMPLETED | OUTPATIENT
Start: 2024-01-01 | End: 2024-01-01

## 2024-01-01 RX ORDER — I.V. FAT EMULSION 20 G/100ML
50 EMULSION INTRAVENOUS
Qty: 23 | Refills: 0
Start: 2024-01-01 | End: 2024-01-01

## 2024-01-01 RX ORDER — SODIUM CHLORIDE 9 MG/ML
500 INJECTION, SOLUTION INTRAVENOUS
Refills: 0 | Status: DISCONTINUED | OUTPATIENT
Start: 2024-01-01 | End: 2024-01-01

## 2024-01-01 RX ORDER — ALLOPURINOL 300 MG
1 TABLET ORAL
Refills: 0
Start: 2024-01-01

## 2024-01-01 RX ORDER — CEFEPIME 1 G/1
1000 INJECTION, POWDER, FOR SOLUTION INTRAMUSCULAR; INTRAVENOUS ONCE
Refills: 0 | Status: COMPLETED | OUTPATIENT
Start: 2024-01-01 | End: 2024-01-01

## 2024-01-01 RX ORDER — VENLAFAXINE HCL 75 MG
225 CAPSULE, EXT RELEASE 24 HR ORAL DAILY
Refills: 0 | Status: DISCONTINUED | OUTPATIENT
Start: 2024-01-01 | End: 2024-01-01

## 2024-01-01 RX ORDER — LEVOTHYROXINE SODIUM 125 MCG
1 TABLET ORAL
Qty: 0 | Refills: 0 | DISCHARGE
Start: 2024-01-01

## 2024-01-01 RX ORDER — URSODIOL 250 MG/1
300 TABLET, FILM COATED ORAL EVERY 12 HOURS
Refills: 0 | Status: DISCONTINUED | OUTPATIENT
Start: 2024-01-01 | End: 2024-01-01

## 2024-01-01 RX ORDER — SCOPALAMINE 1 MG/3D
1 PATCH, EXTENDED RELEASE TRANSDERMAL
Refills: 0 | Status: DISCONTINUED | OUTPATIENT
Start: 2024-01-01 | End: 2024-01-01

## 2024-01-01 RX ORDER — SODIUM,POTASSIUM PHOSPHATES 278-250MG
1 POWDER IN PACKET (EA) ORAL ONCE
Refills: 0 | Status: COMPLETED | OUTPATIENT
Start: 2024-01-01 | End: 2024-01-01

## 2024-01-01 RX ORDER — CEFAZOLIN SODIUM 1 G
1000 VIAL (EA) INJECTION ONCE
Refills: 0 | Status: COMPLETED | OUTPATIENT
Start: 2024-01-01 | End: 2024-01-01

## 2024-01-01 RX ORDER — LANOLIN ALCOHOL/MO/W.PET/CERES
3 CREAM (GRAM) TOPICAL AT BEDTIME
Refills: 0 | Status: DISCONTINUED | OUTPATIENT
Start: 2024-01-01 | End: 2024-01-01

## 2024-01-01 RX ORDER — LEVOTHYROXINE SODIUM 125 MCG
40 TABLET ORAL
Refills: 0 | Status: DISCONTINUED | OUTPATIENT
Start: 2024-01-01 | End: 2024-01-01

## 2024-01-01 RX ORDER — IPRATROPIUM/ALBUTEROL SULFATE 18-103MCG
3 AEROSOL WITH ADAPTER (GRAM) INHALATION ONCE
Refills: 0 | Status: COMPLETED | OUTPATIENT
Start: 2024-01-01 | End: 2024-01-01

## 2024-01-01 RX ORDER — OCTREOTIDE ACETATE 200 UG/ML
50 INJECTION, SOLUTION INTRAVENOUS; SUBCUTANEOUS
Qty: 500 | Refills: 0 | Status: DISCONTINUED | OUTPATIENT
Start: 2024-01-01 | End: 2024-01-01

## 2024-01-01 RX ORDER — METOPROLOL TARTRATE 50 MG
10 TABLET ORAL EVERY 6 HOURS
Refills: 0 | Status: DISCONTINUED | OUTPATIENT
Start: 2024-01-01 | End: 2024-01-01

## 2024-01-01 RX ORDER — METOPROLOL TARTRATE 50 MG
1 TABLET ORAL
Qty: 0 | Refills: 0 | DISCHARGE

## 2024-01-01 RX ORDER — IOHEXOL 300 MG/ML
30 INJECTION, SOLUTION INTRAVENOUS ONCE
Refills: 0 | Status: COMPLETED | OUTPATIENT
Start: 2024-01-01 | End: 2024-01-01

## 2024-01-01 RX ORDER — ONDANSETRON 8 MG/1
4 TABLET, FILM COATED ORAL
Qty: 0 | Refills: 0 | DISCHARGE
Start: 2024-01-01

## 2024-01-01 RX ORDER — FENTANYL CITRATE 50 UG/ML
25 INJECTION INTRAVENOUS ONCE
Refills: 0 | Status: DISCONTINUED | OUTPATIENT
Start: 2024-01-01 | End: 2024-01-01

## 2024-01-01 RX ORDER — ALLOPURINOL 300 MG
300 TABLET ORAL DAILY
Refills: 0 | Status: DISCONTINUED | OUTPATIENT
Start: 2024-01-01 | End: 2024-01-01

## 2024-01-01 RX ORDER — FUROSEMIDE 40 MG
20 TABLET ORAL ONCE
Refills: 0 | Status: DISCONTINUED | OUTPATIENT
Start: 2024-01-01 | End: 2024-01-01

## 2024-01-01 RX ORDER — FUROSEMIDE 40 MG
20 TABLET ORAL DAILY
Refills: 0 | Status: DISCONTINUED | OUTPATIENT
Start: 2024-01-01 | End: 2024-01-01

## 2024-01-01 RX ORDER — METOCLOPRAMIDE HCL 10 MG
10 TABLET ORAL ONCE
Refills: 0 | Status: COMPLETED | OUTPATIENT
Start: 2024-01-01 | End: 2024-01-01

## 2024-01-01 RX ORDER — VANCOMYCIN HCL 1 G
1250 VIAL (EA) INTRAVENOUS EVERY 12 HOURS
Refills: 0 | Status: DISCONTINUED | OUTPATIENT
Start: 2024-01-01 | End: 2024-01-01

## 2024-01-01 RX ORDER — ALBUMIN HUMAN 25 %
50 VIAL (ML) INTRAVENOUS ONCE
Refills: 0 | Status: COMPLETED | OUTPATIENT
Start: 2024-01-01 | End: 2024-01-01

## 2024-01-01 RX ORDER — AMIODARONE HYDROCHLORIDE 400 MG/1
200 TABLET ORAL DAILY
Refills: 0 | Status: DISCONTINUED | OUTPATIENT
Start: 2024-01-01 | End: 2024-01-01

## 2024-01-01 RX ORDER — PANTOPRAZOLE SODIUM 20 MG/1
40 TABLET, DELAYED RELEASE ORAL ONCE
Refills: 0 | Status: DISCONTINUED | OUTPATIENT
Start: 2024-01-01 | End: 2024-01-01

## 2024-01-01 RX ORDER — SODIUM CHLORIDE 9 MG/ML
2000 INJECTION, SOLUTION INTRAVENOUS ONCE
Refills: 0 | Status: COMPLETED | OUTPATIENT
Start: 2024-01-01 | End: 2024-01-01

## 2024-01-01 RX ORDER — DIATRIZOATE MEGLUMINE 180 MG/ML
30 INJECTION, SOLUTION INTRAVESICAL ONCE
Refills: 0 | Status: COMPLETED | OUTPATIENT
Start: 2024-01-01 | End: 2024-01-01

## 2024-01-01 RX ORDER — ONDANSETRON 8 MG/1
4 TABLET, FILM COATED ORAL EVERY 6 HOURS
Refills: 0 | Status: DISCONTINUED | OUTPATIENT
Start: 2024-01-01 | End: 2024-01-01

## 2024-01-01 RX ORDER — VANCOMYCIN HCL 1 G
1250 VIAL (EA) INTRAVENOUS EVERY 24 HOURS
Refills: 0 | Status: DISCONTINUED | OUTPATIENT
Start: 2024-01-01 | End: 2024-01-01

## 2024-01-01 RX ORDER — ROBINUL 0.2 MG/ML
0.4 INJECTION INTRAMUSCULAR; INTRAVENOUS EVERY 6 HOURS
Refills: 0 | Status: DISCONTINUED | OUTPATIENT
Start: 2024-01-01 | End: 2024-01-01

## 2024-01-01 RX ORDER — IRON SUCROSE 20 MG/ML
300 INJECTION, SOLUTION INTRAVENOUS EVERY 24 HOURS
Refills: 0 | Status: COMPLETED | OUTPATIENT
Start: 2024-01-01 | End: 2024-01-01

## 2024-01-01 RX ORDER — SODIUM CHLORIDE 9 MG/ML
500 INJECTION, SOLUTION INTRAVENOUS ONCE
Refills: 0 | Status: DISCONTINUED | OUTPATIENT
Start: 2024-01-01 | End: 2024-01-01

## 2024-01-01 RX ORDER — I.V. FAT EMULSION 20 G/100ML
0.54 EMULSION INTRAVENOUS
Qty: 50.38 | Refills: 0 | Status: DISCONTINUED | OUTPATIENT
Start: 2024-01-01 | End: 2024-01-01

## 2024-01-01 RX ORDER — HEPARIN SODIUM 5000 [USP'U]/ML
5000 INJECTION INTRAVENOUS; SUBCUTANEOUS EVERY 8 HOURS
Refills: 0 | Status: DISCONTINUED | OUTPATIENT
Start: 2024-01-01 | End: 2024-01-01

## 2024-01-01 RX ORDER — CEFAZOLIN SODIUM 1 G
VIAL (EA) INJECTION
Refills: 0 | Status: DISCONTINUED | OUTPATIENT
Start: 2024-01-01 | End: 2024-01-01

## 2024-01-01 RX ORDER — VENLAFAXINE HCL 75 MG
300 CAPSULE, EXT RELEASE 24 HR ORAL DAILY
Refills: 0 | Status: DISCONTINUED | OUTPATIENT
Start: 2024-01-01 | End: 2024-01-01

## 2024-01-01 RX ORDER — ZOLPIDEM TARTRATE 10 MG/1
1 TABLET ORAL
Refills: 0 | DISCHARGE

## 2024-01-01 RX ORDER — REMDESIVIR 5 MG/ML
100 INJECTION INTRAVENOUS EVERY 24 HOURS
Refills: 0 | Status: COMPLETED | OUTPATIENT
Start: 2024-01-01 | End: 2024-01-01

## 2024-01-01 RX ORDER — CEFAZOLIN SODIUM 1 G
2000 VIAL (EA) INJECTION EVERY 12 HOURS
Refills: 0 | Status: DISCONTINUED | OUTPATIENT
Start: 2024-01-01 | End: 2024-01-01

## 2024-01-01 RX ORDER — ALBUMIN HUMAN 25 %
250 VIAL (ML) INTRAVENOUS ONCE
Refills: 0 | Status: COMPLETED | OUTPATIENT
Start: 2024-01-01 | End: 2024-01-01

## 2024-01-01 RX ORDER — LEVOTHYROXINE SODIUM 125 MCG
37.5 TABLET ORAL DAILY
Refills: 0 | Status: DISCONTINUED | OUTPATIENT
Start: 2024-01-01 | End: 2024-01-01

## 2024-01-01 RX ORDER — METRONIDAZOLE 500 MG
500 TABLET ORAL ONCE
Refills: 0 | Status: DISCONTINUED | OUTPATIENT
Start: 2024-01-01 | End: 2024-01-01

## 2024-01-01 RX ORDER — BENZOCAINE AND MENTHOL 5; 1 G/100ML; G/100ML
1 LIQUID ORAL
Refills: 0 | Status: DISCONTINUED | OUTPATIENT
Start: 2024-01-01 | End: 2024-01-01

## 2024-01-01 RX ORDER — BUMETANIDE 0.25 MG/ML
1 INJECTION INTRAMUSCULAR; INTRAVENOUS ONCE
Refills: 0 | Status: COMPLETED | OUTPATIENT
Start: 2024-01-01 | End: 2024-01-01

## 2024-01-01 RX ORDER — HYDROMORPHONE HYDROCHLORIDE 2 MG/ML
0.3 INJECTION INTRAMUSCULAR; INTRAVENOUS; SUBCUTANEOUS
Qty: 10 | Refills: 0 | Status: DISCONTINUED | OUTPATIENT
Start: 2024-01-01 | End: 2024-01-01

## 2024-01-01 RX ORDER — ROSUVASTATIN CALCIUM 5 MG/1
1 TABLET ORAL
Refills: 0 | DISCHARGE

## 2024-01-01 RX ORDER — SODIUM,POTASSIUM PHOSPHATES 278-250MG
1 POWDER IN PACKET (EA) ORAL EVERY 4 HOURS
Refills: 0 | Status: COMPLETED | OUTPATIENT
Start: 2024-01-01 | End: 2024-01-01

## 2024-01-01 RX ORDER — ONDANSETRON 8 MG/1
4 TABLET, FILM COATED ORAL EVERY 8 HOURS
Refills: 0 | Status: DISCONTINUED | OUTPATIENT
Start: 2024-01-01 | End: 2024-01-01

## 2024-01-01 RX ORDER — ATENOLOL 25 MG/1
1 TABLET ORAL
Refills: 0
Start: 2024-01-01

## 2024-01-01 RX ORDER — CEFAZOLIN SODIUM 1 G
2 VIAL (EA) INJECTION
Refills: 0 | DISCHARGE
Start: 2024-01-01

## 2024-01-01 RX ORDER — METOPROLOL TARTRATE 50 MG
5 TABLET ORAL EVERY 6 HOURS
Refills: 0 | Status: DISCONTINUED | OUTPATIENT
Start: 2024-01-01 | End: 2024-01-01

## 2024-01-01 RX ORDER — CEPHALEXIN 500 MG
2 CAPSULE ORAL
Qty: 32 | Refills: 0
Start: 2024-01-01 | End: 2024-01-01

## 2024-01-01 RX ORDER — VENLAFAXINE HCL 75 MG
150 CAPSULE, EXT RELEASE 24 HR ORAL
Refills: 0 | Status: DISCONTINUED | OUTPATIENT
Start: 2024-01-01 | End: 2024-01-01

## 2024-01-01 RX ORDER — I.V. FAT EMULSION 20 G/100ML
0.8 EMULSION INTRAVENOUS
Qty: 78 | Refills: 0 | Status: DISCONTINUED | OUTPATIENT
Start: 2024-01-01 | End: 2024-01-01

## 2024-01-01 RX ORDER — POTASSIUM PHOSPHATE, MONOBASIC POTASSIUM PHOSPHATE, DIBASIC 236; 224 MG/ML; MG/ML
30 INJECTION, SOLUTION INTRAVENOUS ONCE
Refills: 0 | Status: COMPLETED | OUTPATIENT
Start: 2024-01-01 | End: 2024-01-01

## 2024-01-01 RX ORDER — POTASSIUM CHLORIDE 20 MEQ
40 PACKET (EA) ORAL ONCE
Refills: 0 | Status: COMPLETED | OUTPATIENT
Start: 2024-01-01 | End: 2024-01-01

## 2024-01-01 RX ORDER — RACEPINEPHRINE HCL 2.25 %
1 SOLUTION, NON-ORAL TOPICAL EVERY 24 HOURS
Refills: 0 | Status: DISCONTINUED | OUTPATIENT
Start: 2024-01-01 | End: 2024-01-01

## 2024-01-01 RX ORDER — MAGNESIUM SULFATE 500 MG/ML
1 VIAL (ML) INJECTION ONCE
Refills: 0 | Status: DISCONTINUED | OUTPATIENT
Start: 2024-01-01 | End: 2024-01-01

## 2024-01-01 RX ORDER — ENOXAPARIN SODIUM 100 MG/ML
90 INJECTION SUBCUTANEOUS EVERY 12 HOURS
Refills: 0 | Status: DISCONTINUED | OUTPATIENT
Start: 2024-01-01 | End: 2024-01-01

## 2024-01-01 RX ORDER — LEVOTHYROXINE SODIUM 125 MCG
40 TABLET ORAL
Qty: 0 | Refills: 0 | DISCHARGE
Start: 2024-01-01

## 2024-01-01 RX ORDER — PHYTONADIONE (VIT K1) 5 MG
10 TABLET ORAL ONCE
Refills: 0 | Status: DISCONTINUED | OUTPATIENT
Start: 2024-01-01 | End: 2024-01-01

## 2024-01-01 RX ORDER — LEVOTHYROXINE SODIUM 125 MCG
1 TABLET ORAL
Qty: 30 | Refills: 2
Start: 2024-01-01 | End: 2024-01-01

## 2024-01-01 RX ORDER — HYDRALAZINE HCL 50 MG
10 TABLET ORAL ONCE
Refills: 0 | Status: COMPLETED | OUTPATIENT
Start: 2024-01-01 | End: 2024-01-01

## 2024-01-01 RX ORDER — CHLORHEXIDINE GLUCONATE 213 G/1000ML
1 SOLUTION TOPICAL DAILY
Refills: 0 | Status: DISCONTINUED | OUTPATIENT
Start: 2024-01-01 | End: 2024-01-01

## 2024-01-01 RX ORDER — CEFTRIAXONE 500 MG/1
1000 INJECTION, POWDER, FOR SOLUTION INTRAMUSCULAR; INTRAVENOUS EVERY 24 HOURS
Refills: 0 | Status: DISCONTINUED | OUTPATIENT
Start: 2024-01-01 | End: 2024-01-01

## 2024-01-01 RX ORDER — IPRATROPIUM/ALBUTEROL SULFATE 18-103MCG
3 AEROSOL WITH ADAPTER (GRAM) INHALATION
Qty: 0 | Refills: 0 | DISCHARGE
Start: 2024-01-01

## 2024-01-01 RX ORDER — METOPROLOL TARTRATE 50 MG
50 TABLET ORAL EVERY 24 HOURS
Refills: 0 | Status: DISCONTINUED | OUTPATIENT
Start: 2024-01-01 | End: 2024-01-01

## 2024-01-01 RX ORDER — FUROSEMIDE 40 MG
10 TABLET ORAL ONCE
Refills: 0 | Status: COMPLETED | OUTPATIENT
Start: 2024-01-01 | End: 2024-01-01

## 2024-01-01 RX ORDER — TEDUGLUTIDE 5 MG
2.35 KIT SUBCUTANEOUS EVERY 24 HOURS
Refills: 0 | Status: DISCONTINUED | OUTPATIENT
Start: 2024-01-01 | End: 2024-01-01

## 2024-01-01 RX ORDER — CARVEDILOL PHOSPHATE 80 MG/1
6.25 CAPSULE, EXTENDED RELEASE ORAL EVERY 12 HOURS
Refills: 0 | Status: DISCONTINUED | OUTPATIENT
Start: 2024-01-01 | End: 2024-01-01

## 2024-01-01 RX ORDER — ALBUMIN HUMAN 25 %
100 VIAL (ML) INTRAVENOUS EVERY 8 HOURS
Refills: 0 | Status: COMPLETED | OUTPATIENT
Start: 2024-01-01 | End: 2024-01-01

## 2024-01-01 RX ORDER — ERYTHROPOIETIN 10000 [IU]/ML
10000 INJECTION, SOLUTION INTRAVENOUS; SUBCUTANEOUS ONCE
Refills: 0 | Status: DISCONTINUED | OUTPATIENT
Start: 2024-01-01 | End: 2024-01-01

## 2024-01-01 RX ORDER — FUROSEMIDE 40 MG
40 TABLET ORAL EVERY 12 HOURS
Refills: 0 | Status: DISCONTINUED | OUTPATIENT
Start: 2024-01-01 | End: 2024-01-01

## 2024-01-01 RX ORDER — METOPROLOL TARTRATE 50 MG
1 TABLET ORAL
Qty: 0 | Refills: 0 | DISCHARGE
Start: 2024-01-01

## 2024-01-01 RX ORDER — LIPASE/PROTEASE/AMYLASE 16-48-48K
1 CAPSULE,DELAYED RELEASE (ENTERIC COATED) ORAL ONCE
Refills: 0 | Status: COMPLETED | OUTPATIENT
Start: 2024-01-01 | End: 2024-01-01

## 2024-01-01 RX ORDER — INFLUENZA VIRUS VACCINE 15; 15; 15; 15 UG/.5ML; UG/.5ML; UG/.5ML; UG/.5ML
0.7 SUSPENSION INTRAMUSCULAR ONCE
Refills: 0 | Status: COMPLETED | OUTPATIENT
Start: 2024-01-01 | End: 2024-01-01

## 2024-01-01 RX ORDER — I.V. FAT EMULSION 20 G/100ML
0.5 EMULSION INTRAVENOUS
Qty: 50 | Refills: 0 | Status: DISCONTINUED | OUTPATIENT
Start: 2024-01-01 | End: 2024-01-01

## 2024-01-01 RX ORDER — ERGOCALCIFEROL 1.25 MG/1
50000 CAPSULE ORAL
Refills: 0 | Status: DISCONTINUED | OUTPATIENT
Start: 2024-01-01 | End: 2024-01-01

## 2024-01-01 RX ORDER — APIXABAN 2.5 MG/1
1 TABLET, FILM COATED ORAL
Refills: 0
Start: 2024-01-01

## 2024-01-01 RX ORDER — BUPROPION HYDROCHLORIDE 150 MG/1
150 TABLET, EXTENDED RELEASE ORAL DAILY
Refills: 0 | Status: DISCONTINUED | OUTPATIENT
Start: 2024-01-01 | End: 2024-01-01

## 2024-01-01 RX ORDER — ENOXAPARIN SODIUM 100 MG/ML
40 INJECTION SUBCUTANEOUS
Qty: 1 | Refills: 0
Start: 2024-01-01

## 2024-01-01 RX ORDER — HYDROMORPHONE HYDROCHLORIDE 2 MG/ML
1 INJECTION INTRAMUSCULAR; INTRAVENOUS; SUBCUTANEOUS ONCE
Refills: 0 | Status: DISCONTINUED | OUTPATIENT
Start: 2024-01-01 | End: 2024-01-01

## 2024-01-01 RX ORDER — ONDANSETRON 8 MG/1
8 TABLET, FILM COATED ORAL ONCE
Refills: 0 | Status: COMPLETED | OUTPATIENT
Start: 2024-01-01 | End: 2024-01-01

## 2024-01-01 RX ORDER — RACEPINEPHRINE HCL 2.25 %
1 SOLUTION, NON-ORAL TOPICAL DAILY
Refills: 0 | Status: COMPLETED | OUTPATIENT
Start: 2024-01-01 | End: 2024-01-01

## 2024-01-01 RX ORDER — REMDESIVIR 5 MG/ML
200 INJECTION INTRAVENOUS EVERY 24 HOURS
Refills: 0 | Status: COMPLETED | OUTPATIENT
Start: 2024-01-01 | End: 2024-01-01

## 2024-01-01 RX ORDER — METOPROLOL TARTRATE 50 MG
1 TABLET ORAL
Refills: 0 | DISCHARGE
Start: 2024-01-01

## 2024-01-01 RX ORDER — ZOLPIDEM TARTRATE 10 MG/1
10 TABLET ORAL AT BEDTIME
Refills: 0 | Status: DISCONTINUED | OUTPATIENT
Start: 2024-01-01 | End: 2024-01-01

## 2024-01-01 RX ORDER — MORPHINE SULFATE 50 MG/1
4 CAPSULE, EXTENDED RELEASE ORAL ONCE
Refills: 0 | Status: DISCONTINUED | OUTPATIENT
Start: 2024-01-01 | End: 2024-01-01

## 2024-01-01 RX ORDER — LANOLIN ALCOHOL/MO/W.PET/CERES
3 CREAM (GRAM) TOPICAL ONCE
Refills: 0 | Status: COMPLETED | OUTPATIENT
Start: 2024-01-01 | End: 2024-01-01

## 2024-01-01 RX ORDER — DEXTROSE 50 % IN WATER 50 %
50 SYRINGE (ML) INTRAVENOUS ONCE
Refills: 0 | Status: COMPLETED | OUTPATIENT
Start: 2024-01-01 | End: 2024-01-01

## 2024-01-01 RX ORDER — ONDANSETRON 8 MG/1
4 TABLET, FILM COATED ORAL ONCE
Refills: 0 | Status: DISCONTINUED | OUTPATIENT
Start: 2024-01-01 | End: 2024-01-01

## 2024-01-01 RX ORDER — ALLOPURINOL 300 MG
300 TABLET ORAL EVERY 24 HOURS
Refills: 0 | Status: DISCONTINUED | OUTPATIENT
Start: 2024-01-01 | End: 2024-01-01

## 2024-01-01 RX ORDER — LABETALOL HCL 100 MG
10 TABLET ORAL ONCE
Refills: 0 | Status: DISCONTINUED | OUTPATIENT
Start: 2024-01-01 | End: 2024-01-01

## 2024-01-01 RX ORDER — SODIUM CHLORIDE 9 MG/ML
2500 INJECTION INTRAMUSCULAR; INTRAVENOUS; SUBCUTANEOUS ONCE
Refills: 0 | Status: COMPLETED | OUTPATIENT
Start: 2024-01-01 | End: 2024-01-01

## 2024-01-01 RX ORDER — ERYTHROPOIETIN 10000 [IU]/ML
3000 INJECTION, SOLUTION INTRAVENOUS; SUBCUTANEOUS ONCE
Refills: 0 | Status: DISCONTINUED | OUTPATIENT
Start: 2024-01-01 | End: 2024-01-01

## 2024-01-01 RX ORDER — VENLAFAXINE HCL 75 MG
150 CAPSULE, EXT RELEASE 24 HR ORAL DAILY
Refills: 0 | Status: DISCONTINUED | OUTPATIENT
Start: 2024-01-01 | End: 2024-01-01

## 2024-01-01 RX ORDER — ALBUMIN HUMAN 25 %
250 VIAL (ML) INTRAVENOUS ONCE
Refills: 0 | Status: DISCONTINUED | OUTPATIENT
Start: 2024-01-01 | End: 2024-01-01

## 2024-01-01 RX ORDER — SIMETHICONE 80 MG/1
1 TABLET, CHEWABLE ORAL
Qty: 14 | Refills: 0
Start: 2024-01-01 | End: 2024-01-01

## 2024-01-01 RX ORDER — HYDRALAZINE HCL 50 MG
10 TABLET ORAL EVERY 12 HOURS
Refills: 0 | Status: DISCONTINUED | OUTPATIENT
Start: 2024-01-01 | End: 2024-01-01

## 2024-01-01 RX ORDER — FUROSEMIDE 40 MG
40 TABLET ORAL EVERY 6 HOURS
Refills: 0 | Status: COMPLETED | OUTPATIENT
Start: 2024-01-01 | End: 2024-01-01

## 2024-01-01 RX ORDER — SODIUM ZIRCONIUM CYCLOSILICATE 10 G/10G
5 POWDER, FOR SUSPENSION ORAL ONCE
Refills: 0 | Status: COMPLETED | OUTPATIENT
Start: 2024-01-01 | End: 2024-01-01

## 2024-01-01 RX ORDER — ERYTHROPOIETIN 10000 [IU]/ML
10000 INJECTION, SOLUTION INTRAVENOUS; SUBCUTANEOUS ONCE
Refills: 0 | Status: COMPLETED | OUTPATIENT
Start: 2024-01-01 | End: 2024-01-01

## 2024-01-01 RX ORDER — AZITHROMYCIN 500 MG/1
500 TABLET, FILM COATED ORAL EVERY 24 HOURS
Refills: 0 | Status: DISCONTINUED | OUTPATIENT
Start: 2024-01-01 | End: 2024-01-01

## 2024-01-01 RX ORDER — POTASSIUM CHLORIDE 20 MEQ
10 PACKET (EA) ORAL
Refills: 0 | Status: DISCONTINUED | OUTPATIENT
Start: 2024-01-01 | End: 2024-01-01

## 2024-01-01 RX ORDER — TEDUGLUTIDE 5 MG
4.7 KIT SUBCUTANEOUS EVERY 24 HOURS
Refills: 0 | Status: DISCONTINUED | OUTPATIENT
Start: 2024-01-01 | End: 2024-01-01

## 2024-01-01 RX ORDER — ACETAMINOPHEN 500 MG
1000 TABLET ORAL ONCE
Refills: 0 | Status: COMPLETED | OUTPATIENT
Start: 2024-01-01 | End: 2024-01-01

## 2024-01-01 RX ORDER — TEDUGLUTIDE 5 MG
0.05 KIT SUBCUTANEOUS
Refills: 0 | DISCHARGE

## 2024-01-01 RX ORDER — METOPROLOL TARTRATE 50 MG
0.5 TABLET ORAL
Qty: 15 | Refills: 2
Start: 2024-01-01 | End: 2024-01-01

## 2024-01-01 RX ORDER — I.V. FAT EMULSION 20 G/100ML
0.52 EMULSION INTRAVENOUS
Qty: 49.82 | Refills: 0 | Status: DISCONTINUED | OUTPATIENT
Start: 2024-01-01 | End: 2024-01-01

## 2024-01-01 RX ORDER — ERYTHROPOIETIN 10000 [IU]/ML
3000 INJECTION, SOLUTION INTRAVENOUS; SUBCUTANEOUS ONCE
Refills: 0 | Status: COMPLETED | OUTPATIENT
Start: 2024-01-01 | End: 2024-01-01

## 2024-01-01 RX ORDER — VENLAFAXINE HCL 75 MG
1 CAPSULE, EXT RELEASE 24 HR ORAL
Refills: 0
Start: 2024-01-01

## 2024-01-01 RX ORDER — PANTOPRAZOLE SODIUM 20 MG/1
40 TABLET, DELAYED RELEASE ORAL
Refills: 0 | Status: DISCONTINUED | OUTPATIENT
Start: 2024-01-01 | End: 2024-01-01

## 2024-01-01 RX ORDER — LEVOTHYROXINE SODIUM 125 MCG
1 TABLET ORAL
Refills: 0
Start: 2024-01-01

## 2024-01-01 RX ORDER — CEFEPIME 1 G/1
2000 INJECTION, POWDER, FOR SOLUTION INTRAMUSCULAR; INTRAVENOUS EVERY 12 HOURS
Refills: 0 | Status: COMPLETED | OUTPATIENT
Start: 2024-01-01 | End: 2024-01-01

## 2024-01-01 RX ORDER — DESMOPRESSIN ACETATE 0.1 MG/1
0.2 TABLET ORAL ONCE
Refills: 0 | Status: COMPLETED | OUTPATIENT
Start: 2024-01-01 | End: 2024-01-01

## 2024-01-01 RX ORDER — VENLAFAXINE HCL 75 MG
150 CAPSULE, EXT RELEASE 24 HR ORAL EVERY 12 HOURS
Refills: 0 | Status: DISCONTINUED | OUTPATIENT
Start: 2024-01-01 | End: 2024-01-01

## 2024-01-01 RX ADMIN — CHLORHEXIDINE GLUCONATE 1 APPLICATION(S): 213 SOLUTION TOPICAL at 06:28

## 2024-01-01 RX ADMIN — HEPARIN SODIUM 5000 UNIT(S): 5000 INJECTION INTRAVENOUS; SUBCUTANEOUS at 06:39

## 2024-01-01 RX ADMIN — Medication 40 MICROGRAM(S): at 06:24

## 2024-01-01 RX ADMIN — Medication 1 CAPSULE(S): at 11:03

## 2024-01-01 RX ADMIN — Medication 50 MILLIGRAM(S): at 06:01

## 2024-01-01 RX ADMIN — Medication 650 MILLIGRAM(S): at 04:00

## 2024-01-01 RX ADMIN — ONDANSETRON 4 MILLIGRAM(S): 8 TABLET, FILM COATED ORAL at 17:23

## 2024-01-01 RX ADMIN — ONDANSETRON 4 MILLIGRAM(S): 8 TABLET, FILM COATED ORAL at 01:10

## 2024-01-01 RX ADMIN — HEPARIN SODIUM 5000 UNIT(S): 5000 INJECTION INTRAVENOUS; SUBCUTANEOUS at 14:14

## 2024-01-01 RX ADMIN — CHOLESTYRAMINE 4 GRAM(S): 4 POWDER, FOR SUSPENSION ORAL at 05:59

## 2024-01-01 RX ADMIN — SODIUM CHLORIDE 500 MILLILITER(S): 9 INJECTION, SOLUTION INTRAVENOUS at 05:41

## 2024-01-01 RX ADMIN — Medication 1 EACH: at 18:14

## 2024-01-01 RX ADMIN — CHLORHEXIDINE GLUCONATE 15 MILLILITER(S): 213 SOLUTION TOPICAL at 21:08

## 2024-01-01 RX ADMIN — Medication 3 MILLILITER(S): at 22:33

## 2024-01-01 RX ADMIN — Medication 300 MILLIGRAM(S): at 05:27

## 2024-01-01 RX ADMIN — Medication 150 MILLIGRAM(S): at 21:59

## 2024-01-01 RX ADMIN — CHLORHEXIDINE GLUCONATE 1 APPLICATION(S): 213 SOLUTION TOPICAL at 06:41

## 2024-01-01 RX ADMIN — CEFEPIME 100 MILLIGRAM(S): 1 INJECTION, POWDER, FOR SOLUTION INTRAMUSCULAR; INTRAVENOUS at 21:03

## 2024-01-01 RX ADMIN — Medication 7.5 MILLIGRAM(S): at 06:19

## 2024-01-01 RX ADMIN — URSODIOL 300 MILLIGRAM(S): 250 TABLET, FILM COATED ORAL at 05:02

## 2024-01-01 RX ADMIN — Medication 1 APPLICATION(S): at 19:31

## 2024-01-01 RX ADMIN — Medication 1 EACH: at 19:20

## 2024-01-01 RX ADMIN — URSODIOL 300 MILLIGRAM(S): 250 TABLET, FILM COATED ORAL at 22:59

## 2024-01-01 RX ADMIN — Medication 10 MILLIGRAM(S): at 10:06

## 2024-01-01 RX ADMIN — Medication 3 MILLILITER(S): at 00:01

## 2024-01-01 RX ADMIN — Medication 150 MILLIGRAM(S): at 11:02

## 2024-01-01 RX ADMIN — TEDUGLUTIDE 4.7 MILLIGRAM(S): KIT at 13:58

## 2024-01-01 RX ADMIN — Medication 10 MILLIGRAM(S): at 06:39

## 2024-01-01 RX ADMIN — Medication 300 MILLIGRAM(S): at 16:57

## 2024-01-01 RX ADMIN — Medication 10 MILLIGRAM(S): at 00:56

## 2024-01-01 RX ADMIN — Medication 10 MILLIGRAM(S): at 05:40

## 2024-01-01 RX ADMIN — Medication 500 MILLILITER(S): at 12:28

## 2024-01-01 RX ADMIN — Medication 50 MILLILITER(S): at 23:26

## 2024-01-01 RX ADMIN — TEDUGLUTIDE 4.7 MILLIGRAM(S): KIT at 12:34

## 2024-01-01 RX ADMIN — I.V. FAT EMULSION 20.83 GM/KG/DAY: 20 EMULSION INTRAVENOUS at 17:13

## 2024-01-01 RX ADMIN — CARVEDILOL PHOSPHATE 6.25 MILLIGRAM(S): 80 CAPSULE, EXTENDED RELEASE ORAL at 09:05

## 2024-01-01 RX ADMIN — CHLORHEXIDINE GLUCONATE 1 APPLICATION(S): 213 SOLUTION TOPICAL at 12:01

## 2024-01-01 RX ADMIN — TEDUGLUTIDE 4.7 MILLIGRAM(S): KIT at 12:56

## 2024-01-01 RX ADMIN — Medication 50 MILLIGRAM(S): at 05:38

## 2024-01-01 RX ADMIN — Medication 1 EACH: at 17:48

## 2024-01-01 RX ADMIN — Medication 1 EACH: at 18:34

## 2024-01-01 RX ADMIN — Medication 10 MILLIGRAM(S): at 23:12

## 2024-01-01 RX ADMIN — Medication 100 MILLIEQUIVALENT(S): at 06:53

## 2024-01-01 RX ADMIN — I.V. FAT EMULSION 20.83 GM/KG/DAY: 20 EMULSION INTRAVENOUS at 17:27

## 2024-01-01 RX ADMIN — Medication 40 MICROGRAM(S): at 08:01

## 2024-01-01 RX ADMIN — PANTOPRAZOLE SODIUM 40 MILLIGRAM(S): 20 TABLET, DELAYED RELEASE ORAL at 05:36

## 2024-01-01 RX ADMIN — Medication 2: at 23:34

## 2024-01-01 RX ADMIN — ONDANSETRON 4 MILLIGRAM(S): 8 TABLET, FILM COATED ORAL at 07:11

## 2024-01-01 RX ADMIN — SIMETHICONE 80 MILLIGRAM(S): 80 TABLET, CHEWABLE ORAL at 10:56

## 2024-01-01 RX ADMIN — ENOXAPARIN SODIUM 40 MILLIGRAM(S): 100 INJECTION SUBCUTANEOUS at 11:48

## 2024-01-01 RX ADMIN — Medication 1 CAPSULE(S): at 07:33

## 2024-01-01 RX ADMIN — ERYTHROPOIETIN 10000 UNIT(S): 10000 INJECTION, SOLUTION INTRAVENOUS; SUBCUTANEOUS at 09:44

## 2024-01-01 RX ADMIN — Medication 50 MICROGRAM(S): at 05:33

## 2024-01-01 RX ADMIN — Medication 0.25 MILLIGRAM(S): at 00:45

## 2024-01-01 RX ADMIN — URSODIOL 300 MILLIGRAM(S): 250 TABLET, FILM COATED ORAL at 14:15

## 2024-01-01 RX ADMIN — Medication 1: at 06:57

## 2024-01-01 RX ADMIN — URSODIOL 300 MILLIGRAM(S): 250 TABLET, FILM COATED ORAL at 21:30

## 2024-01-01 RX ADMIN — Medication 40 MILLIGRAM(S): at 06:15

## 2024-01-01 RX ADMIN — ONDANSETRON 4 MILLIGRAM(S): 8 TABLET, FILM COATED ORAL at 11:50

## 2024-01-01 RX ADMIN — CHOLESTYRAMINE 4 GRAM(S): 4 POWDER, FOR SUSPENSION ORAL at 11:33

## 2024-01-01 RX ADMIN — Medication 300 MILLIGRAM(S): at 11:48

## 2024-01-01 RX ADMIN — HYDROMORPHONE HYDROCHLORIDE 0.5 MILLIGRAM(S): 2 INJECTION INTRAMUSCULAR; INTRAVENOUS; SUBCUTANEOUS at 03:05

## 2024-01-01 RX ADMIN — ACETAZOLAMIDE 105 MILLIGRAM(S): 250 TABLET ORAL at 19:34

## 2024-01-01 RX ADMIN — Medication 50 MILLILITER(S): at 21:26

## 2024-01-01 RX ADMIN — ERYTHROPOIETIN 10000 UNIT(S): 10000 INJECTION, SOLUTION INTRAVENOUS; SUBCUTANEOUS at 14:28

## 2024-01-01 RX ADMIN — Medication 10 MILLIGRAM(S): at 22:19

## 2024-01-01 RX ADMIN — Medication 150 MILLIGRAM(S): at 11:11

## 2024-01-01 RX ADMIN — Medication 50 MILLILITER(S): at 20:54

## 2024-01-01 RX ADMIN — URSODIOL 300 MILLIGRAM(S): 250 TABLET, FILM COATED ORAL at 22:58

## 2024-01-01 RX ADMIN — Medication 150 MILLIGRAM(S): at 13:22

## 2024-01-01 RX ADMIN — TEDUGLUTIDE 4.7 MILLIGRAM(S): KIT at 11:06

## 2024-01-01 RX ADMIN — Medication 300 MILLIGRAM(S): at 05:58

## 2024-01-01 RX ADMIN — Medication 100 MILLIEQUIVALENT(S): at 14:27

## 2024-01-01 RX ADMIN — Medication 100 MILLIGRAM(S): at 13:09

## 2024-01-01 RX ADMIN — Medication 10 MILLIGRAM(S): at 14:14

## 2024-01-01 RX ADMIN — Medication 3 MILLIGRAM(S): at 03:52

## 2024-01-01 RX ADMIN — CHLORHEXIDINE GLUCONATE 1 APPLICATION(S): 213 SOLUTION TOPICAL at 05:02

## 2024-01-01 RX ADMIN — ONDANSETRON 4 MILLIGRAM(S): 8 TABLET, FILM COATED ORAL at 12:08

## 2024-01-01 RX ADMIN — ATORVASTATIN CALCIUM 20 MILLIGRAM(S): 80 TABLET, FILM COATED ORAL at 21:46

## 2024-01-01 RX ADMIN — Medication 100 MILLIGRAM(S): at 14:30

## 2024-01-01 RX ADMIN — Medication 125 MILLILITER(S): at 23:02

## 2024-01-01 RX ADMIN — Medication 50 MILLILITER(S): at 15:05

## 2024-01-01 RX ADMIN — Medication 40 MICROGRAM(S): at 07:52

## 2024-01-01 RX ADMIN — Medication 100 MILLIGRAM(S): at 18:58

## 2024-01-01 RX ADMIN — Medication 3 MILLILITER(S): at 09:46

## 2024-01-01 RX ADMIN — Medication 1 MILLIGRAM(S): at 00:33

## 2024-01-01 RX ADMIN — Medication 1 APPLICATION(S): at 18:29

## 2024-01-01 RX ADMIN — Medication 1 APPLICATION(S): at 17:23

## 2024-01-01 RX ADMIN — Medication 300 MILLIGRAM(S): at 12:55

## 2024-01-01 RX ADMIN — CHLORHEXIDINE GLUCONATE 1 APPLICATION(S): 213 SOLUTION TOPICAL at 06:21

## 2024-01-01 RX ADMIN — ONDANSETRON 4 MILLIGRAM(S): 8 TABLET, FILM COATED ORAL at 05:32

## 2024-01-01 RX ADMIN — SODIUM CHLORIDE 500 MILLILITER(S): 9 INJECTION INTRAMUSCULAR; INTRAVENOUS; SUBCUTANEOUS at 06:27

## 2024-01-01 RX ADMIN — Medication 10 MILLIGRAM(S): at 10:05

## 2024-01-01 RX ADMIN — Medication 50 MILLILITER(S): at 21:49

## 2024-01-01 RX ADMIN — ATORVASTATIN CALCIUM 20 MILLIGRAM(S): 80 TABLET, FILM COATED ORAL at 22:27

## 2024-01-01 RX ADMIN — CHOLESTYRAMINE 4 GRAM(S): 4 POWDER, FOR SUSPENSION ORAL at 18:01

## 2024-01-01 RX ADMIN — Medication 50 MICROGRAM(S): at 05:59

## 2024-01-01 RX ADMIN — I.V. FAT EMULSION 20.8 GM/KG/DAY: 20 EMULSION INTRAVENOUS at 18:40

## 2024-01-01 RX ADMIN — Medication 10 MILLIGRAM(S): at 09:07

## 2024-01-01 RX ADMIN — IOHEXOL 30 MILLILITER(S): 300 INJECTION, SOLUTION INTRAVENOUS at 15:48

## 2024-01-01 RX ADMIN — Medication 150 MILLIGRAM(S): at 11:53

## 2024-01-01 RX ADMIN — I.V. FAT EMULSION 20.83 GM/KG/DAY: 20 EMULSION INTRAVENOUS at 17:30

## 2024-01-01 RX ADMIN — ENOXAPARIN SODIUM 40 MILLIGRAM(S): 100 INJECTION SUBCUTANEOUS at 12:24

## 2024-01-01 RX ADMIN — Medication 1 CAPSULE(S): at 15:58

## 2024-01-01 RX ADMIN — Medication 1 CAPSULE(S): at 07:38

## 2024-01-01 RX ADMIN — ONDANSETRON 4 MILLIGRAM(S): 8 TABLET, FILM COATED ORAL at 00:38

## 2024-01-01 RX ADMIN — Medication 30 MICROGRAM(S): at 22:50

## 2024-01-01 RX ADMIN — Medication 30 MICROGRAM(S): at 21:53

## 2024-01-01 RX ADMIN — Medication 3 MILLILITER(S): at 19:34

## 2024-01-01 RX ADMIN — Medication 37.5 MICROGRAM(S): at 05:59

## 2024-01-01 RX ADMIN — Medication 1 CAPSULE(S): at 11:36

## 2024-01-01 RX ADMIN — GABAPENTIN 100 MILLIGRAM(S): 400 CAPSULE ORAL at 21:34

## 2024-01-01 RX ADMIN — ROBINUL 0.4 MILLIGRAM(S): 0.2 INJECTION INTRAMUSCULAR; INTRAVENOUS at 09:40

## 2024-01-01 RX ADMIN — Medication 5 MILLIGRAM(S): at 07:13

## 2024-01-01 RX ADMIN — Medication 1 CAPSULE(S): at 17:29

## 2024-01-01 RX ADMIN — GABAPENTIN 100 MILLIGRAM(S): 400 CAPSULE ORAL at 21:45

## 2024-01-01 RX ADMIN — Medication 0.5 MILLIGRAM(S): at 02:01

## 2024-01-01 RX ADMIN — Medication 10 MILLIGRAM(S): at 16:17

## 2024-01-01 RX ADMIN — ONDANSETRON 4 MILLIGRAM(S): 8 TABLET, FILM COATED ORAL at 22:17

## 2024-01-01 RX ADMIN — ATORVASTATIN CALCIUM 20 MILLIGRAM(S): 80 TABLET, FILM COATED ORAL at 22:11

## 2024-01-01 RX ADMIN — CEFEPIME 100 MILLIGRAM(S): 1 INJECTION, POWDER, FOR SOLUTION INTRAMUSCULAR; INTRAVENOUS at 09:45

## 2024-01-01 RX ADMIN — Medication 5 MILLIGRAM(S): at 18:22

## 2024-01-01 RX ADMIN — HYDROMORPHONE HYDROCHLORIDE 0.2 MILLIGRAM(S): 2 INJECTION INTRAMUSCULAR; INTRAVENOUS; SUBCUTANEOUS at 12:58

## 2024-01-01 RX ADMIN — Medication 1 CAPSULE(S): at 17:41

## 2024-01-01 RX ADMIN — CHLORHEXIDINE GLUCONATE 1 APPLICATION(S): 213 SOLUTION TOPICAL at 06:03

## 2024-01-01 RX ADMIN — GABAPENTIN 100 MILLIGRAM(S): 400 CAPSULE ORAL at 22:43

## 2024-01-01 RX ADMIN — Medication 10 MILLIGRAM(S): at 06:27

## 2024-01-01 RX ADMIN — Medication 40 MILLIGRAM(S): at 14:15

## 2024-01-01 RX ADMIN — URSODIOL 300 MILLIGRAM(S): 250 TABLET, FILM COATED ORAL at 14:08

## 2024-01-01 RX ADMIN — I.V. FAT EMULSION 20.83 GM/KG/DAY: 20 EMULSION INTRAVENOUS at 17:25

## 2024-01-01 RX ADMIN — Medication 1 CAPSULE(S): at 16:00

## 2024-01-01 RX ADMIN — Medication 3 MILLILITER(S): at 03:34

## 2024-01-01 RX ADMIN — HYDROMORPHONE HYDROCHLORIDE 0.5 MILLIGRAM(S): 2 INJECTION INTRAMUSCULAR; INTRAVENOUS; SUBCUTANEOUS at 10:07

## 2024-01-01 RX ADMIN — Medication 1 CAPSULE(S): at 16:41

## 2024-01-01 RX ADMIN — Medication 2: at 07:18

## 2024-01-01 RX ADMIN — CHOLESTYRAMINE 4 GRAM(S): 4 POWDER, FOR SUSPENSION ORAL at 06:55

## 2024-01-01 RX ADMIN — Medication 100 MILLIGRAM(S): at 06:52

## 2024-01-01 RX ADMIN — GABAPENTIN 100 MILLIGRAM(S): 400 CAPSULE ORAL at 21:47

## 2024-01-01 RX ADMIN — Medication 1 CAPSULE(S): at 11:57

## 2024-01-01 RX ADMIN — URSODIOL 300 MILLIGRAM(S): 250 TABLET, FILM COATED ORAL at 06:24

## 2024-01-01 RX ADMIN — URSODIOL 300 MILLIGRAM(S): 250 TABLET, FILM COATED ORAL at 21:53

## 2024-01-01 RX ADMIN — GABAPENTIN 100 MILLIGRAM(S): 400 CAPSULE ORAL at 21:15

## 2024-01-01 RX ADMIN — INFLUENZA VIRUS VACCINE 0.7 MILLILITER(S): 15; 15; 15; 15 SUSPENSION INTRAMUSCULAR at 11:29

## 2024-01-01 RX ADMIN — URSODIOL 300 MILLIGRAM(S): 250 TABLET, FILM COATED ORAL at 21:46

## 2024-01-01 RX ADMIN — CHLORHEXIDINE GLUCONATE 1 APPLICATION(S): 213 SOLUTION TOPICAL at 06:57

## 2024-01-01 RX ADMIN — CHLORHEXIDINE GLUCONATE 1 APPLICATION(S): 213 SOLUTION TOPICAL at 05:41

## 2024-01-01 RX ADMIN — Medication 5 MILLIGRAM(S): at 22:43

## 2024-01-01 RX ADMIN — Medication 50 MILLILITER(S): at 20:26

## 2024-01-01 RX ADMIN — TEDUGLUTIDE 4.7 MILLIGRAM(S): KIT at 12:22

## 2024-01-01 RX ADMIN — Medication 100 MILLIEQUIVALENT(S): at 03:08

## 2024-01-01 RX ADMIN — Medication 5 MILLIGRAM(S): at 21:15

## 2024-01-01 RX ADMIN — Medication 225 MILLIGRAM(S): at 12:51

## 2024-01-01 RX ADMIN — Medication 5 MILLIGRAM(S): at 21:24

## 2024-01-01 RX ADMIN — Medication 150 MILLIGRAM(S): at 17:54

## 2024-01-01 RX ADMIN — Medication 0.5 MILLIGRAM(S): at 02:32

## 2024-01-01 RX ADMIN — MIDODRINE HYDROCHLORIDE 5 MILLIGRAM(S): 2.5 TABLET ORAL at 05:05

## 2024-01-01 RX ADMIN — IRON SUCROSE 210 MILLIGRAM(S): 20 INJECTION, SOLUTION INTRAVENOUS at 11:16

## 2024-01-01 RX ADMIN — ERGOCALCIFEROL 50000 UNIT(S): 1.25 CAPSULE ORAL at 10:05

## 2024-01-01 RX ADMIN — Medication 1 MILLIGRAM(S): at 23:43

## 2024-01-01 RX ADMIN — SODIUM CHLORIDE 1000 MILLILITER(S): 9 INJECTION, SOLUTION INTRAVENOUS at 10:06

## 2024-01-01 RX ADMIN — Medication 0.25 MILLIGRAM(S): at 23:58

## 2024-01-01 RX ADMIN — Medication 1 CAPSULE(S): at 16:58

## 2024-01-01 RX ADMIN — Medication 40 MICROGRAM(S): at 05:33

## 2024-01-01 RX ADMIN — Medication 1 EACH: at 19:18

## 2024-01-01 RX ADMIN — Medication 150 MILLIGRAM(S): at 12:12

## 2024-01-01 RX ADMIN — ENOXAPARIN SODIUM 90 MILLIGRAM(S): 100 INJECTION SUBCUTANEOUS at 11:30

## 2024-01-01 RX ADMIN — Medication 1: at 12:46

## 2024-01-01 RX ADMIN — Medication 1: at 08:44

## 2024-01-01 RX ADMIN — CHOLESTYRAMINE 4 GRAM(S): 4 POWDER, FOR SUSPENSION ORAL at 18:53

## 2024-01-01 RX ADMIN — URSODIOL 300 MILLIGRAM(S): 250 TABLET, FILM COATED ORAL at 05:58

## 2024-01-01 RX ADMIN — CHLORHEXIDINE GLUCONATE 1 APPLICATION(S): 213 SOLUTION TOPICAL at 06:51

## 2024-01-01 RX ADMIN — Medication 10 MILLIGRAM(S): at 03:54

## 2024-01-01 RX ADMIN — URSODIOL 300 MILLIGRAM(S): 250 TABLET, FILM COATED ORAL at 05:12

## 2024-01-01 RX ADMIN — ONDANSETRON 8 MILLIGRAM(S): 8 TABLET, FILM COATED ORAL at 11:47

## 2024-01-01 RX ADMIN — Medication 0.5 MILLIGRAM(S): at 03:57

## 2024-01-01 RX ADMIN — Medication 50 MILLIGRAM(S): at 18:41

## 2024-01-01 RX ADMIN — ATORVASTATIN CALCIUM 20 MILLIGRAM(S): 80 TABLET, FILM COATED ORAL at 23:12

## 2024-01-01 RX ADMIN — Medication 100 MILLIGRAM(S): at 16:21

## 2024-01-01 RX ADMIN — Medication 1 MILLIGRAM(S): at 23:18

## 2024-01-01 RX ADMIN — Medication 10 MILLIGRAM(S): at 11:40

## 2024-01-01 RX ADMIN — GABAPENTIN 100 MILLIGRAM(S): 400 CAPSULE ORAL at 21:27

## 2024-01-01 RX ADMIN — Medication 100 MILLIEQUIVALENT(S): at 21:07

## 2024-01-01 RX ADMIN — Medication 5 MILLIGRAM(S): at 22:18

## 2024-01-01 RX ADMIN — ONDANSETRON 4 MILLIGRAM(S): 8 TABLET, FILM COATED ORAL at 00:44

## 2024-01-01 RX ADMIN — Medication 0.5 MILLIGRAM(S): at 21:27

## 2024-01-01 RX ADMIN — CHOLESTYRAMINE 4 GRAM(S): 4 POWDER, FOR SUSPENSION ORAL at 15:41

## 2024-01-01 RX ADMIN — HYDROMORPHONE HYDROCHLORIDE 0.5 MILLIGRAM(S): 2 INJECTION INTRAMUSCULAR; INTRAVENOUS; SUBCUTANEOUS at 23:12

## 2024-01-01 RX ADMIN — ENOXAPARIN SODIUM 40 MILLIGRAM(S): 100 INJECTION SUBCUTANEOUS at 11:05

## 2024-01-01 RX ADMIN — Medication 5 MILLIGRAM(S): at 06:33

## 2024-01-01 RX ADMIN — Medication 3 MILLILITER(S): at 17:01

## 2024-01-01 RX ADMIN — Medication 1 MILLIGRAM(S): at 21:26

## 2024-01-01 RX ADMIN — CHLORHEXIDINE GLUCONATE 1 APPLICATION(S): 213 SOLUTION TOPICAL at 06:40

## 2024-01-01 RX ADMIN — Medication 5 MILLIGRAM(S): at 22:51

## 2024-01-01 RX ADMIN — Medication 5 MILLIGRAM(S): at 21:12

## 2024-01-01 RX ADMIN — I.V. FAT EMULSION 20.8 GM/KG/DAY: 20 EMULSION INTRAVENOUS at 17:48

## 2024-01-01 RX ADMIN — Medication 30 MICROGRAM(S): at 21:09

## 2024-01-01 RX ADMIN — I.V. FAT EMULSION 20.83 GM/KG/DAY: 20 EMULSION INTRAVENOUS at 18:01

## 2024-01-01 RX ADMIN — Medication 10 MILLIGRAM(S): at 06:09

## 2024-01-01 RX ADMIN — Medication 50 MILLIGRAM(S): at 15:26

## 2024-01-01 RX ADMIN — Medication 150 MILLIGRAM(S): at 11:34

## 2024-01-01 RX ADMIN — Medication 20 MILLIGRAM(S): at 18:06

## 2024-01-01 RX ADMIN — URSODIOL 300 MILLIGRAM(S): 250 TABLET, FILM COATED ORAL at 07:11

## 2024-01-01 RX ADMIN — Medication 0.5 MILLIGRAM(S): at 01:55

## 2024-01-01 RX ADMIN — Medication 1 CAPSULE(S): at 13:17

## 2024-01-01 RX ADMIN — Medication 100 MILLIGRAM(S): at 21:51

## 2024-01-01 RX ADMIN — Medication 5 MILLIGRAM(S): at 21:58

## 2024-01-01 RX ADMIN — MIDODRINE HYDROCHLORIDE 5 MILLIGRAM(S): 2.5 TABLET ORAL at 05:15

## 2024-01-01 RX ADMIN — Medication 50 MILLIGRAM(S): at 09:53

## 2024-01-01 RX ADMIN — CHOLESTYRAMINE 4 GRAM(S): 4 POWDER, FOR SUSPENSION ORAL at 09:13

## 2024-01-01 RX ADMIN — HEPARIN SODIUM 5000 UNIT(S): 5000 INJECTION INTRAVENOUS; SUBCUTANEOUS at 14:48

## 2024-01-01 RX ADMIN — Medication 50 MILLILITER(S): at 00:00

## 2024-01-01 RX ADMIN — CHOLESTYRAMINE 4 GRAM(S): 4 POWDER, FOR SUSPENSION ORAL at 18:00

## 2024-01-01 RX ADMIN — Medication 50 MILLILITER(S): at 05:58

## 2024-01-01 RX ADMIN — Medication 166.67 MILLIGRAM(S): at 06:36

## 2024-01-01 RX ADMIN — CEFEPIME 12.5 MILLIGRAM(S): 1 INJECTION, POWDER, FOR SOLUTION INTRAMUSCULAR; INTRAVENOUS at 22:00

## 2024-01-01 RX ADMIN — Medication 30 MICROGRAM(S): at 21:10

## 2024-01-01 RX ADMIN — GABAPENTIN 100 MILLIGRAM(S): 400 CAPSULE ORAL at 21:49

## 2024-01-01 RX ADMIN — URSODIOL 300 MILLIGRAM(S): 250 TABLET, FILM COATED ORAL at 06:17

## 2024-01-01 RX ADMIN — Medication 3 MILLILITER(S): at 06:49

## 2024-01-01 RX ADMIN — URSODIOL 300 MILLIGRAM(S): 250 TABLET, FILM COATED ORAL at 05:38

## 2024-01-01 RX ADMIN — Medication 1 EACH: at 17:54

## 2024-01-01 RX ADMIN — Medication 5 MILLIGRAM(S): at 22:12

## 2024-01-01 RX ADMIN — ERGOCALCIFEROL 50000 UNIT(S): 1.25 CAPSULE ORAL at 07:13

## 2024-01-01 RX ADMIN — Medication 50 MILLIGRAM(S): at 22:15

## 2024-01-01 RX ADMIN — Medication 40 MICROGRAM(S): at 05:13

## 2024-01-01 RX ADMIN — CEFEPIME 100 MILLIGRAM(S): 1 INJECTION, POWDER, FOR SOLUTION INTRAMUSCULAR; INTRAVENOUS at 10:20

## 2024-01-01 RX ADMIN — Medication 10 MILLIGRAM(S): at 10:01

## 2024-01-01 RX ADMIN — Medication 3 MILLILITER(S): at 16:39

## 2024-01-01 RX ADMIN — URSODIOL 300 MILLIGRAM(S): 250 TABLET, FILM COATED ORAL at 13:47

## 2024-01-01 RX ADMIN — GABAPENTIN 100 MILLIGRAM(S): 400 CAPSULE ORAL at 21:00

## 2024-01-01 RX ADMIN — ATORVASTATIN CALCIUM 20 MILLIGRAM(S): 80 TABLET, FILM COATED ORAL at 21:27

## 2024-01-01 RX ADMIN — Medication 1: at 11:13

## 2024-01-01 RX ADMIN — CHLORHEXIDINE GLUCONATE 15 MILLILITER(S): 213 SOLUTION TOPICAL at 06:25

## 2024-01-01 RX ADMIN — URSODIOL 300 MILLIGRAM(S): 250 TABLET, FILM COATED ORAL at 06:12

## 2024-01-01 RX ADMIN — Medication 5 MILLIGRAM(S): at 11:36

## 2024-01-01 RX ADMIN — Medication 50 MILLIGRAM(S): at 19:11

## 2024-01-01 RX ADMIN — URSODIOL 300 MILLIGRAM(S): 250 TABLET, FILM COATED ORAL at 04:59

## 2024-01-01 RX ADMIN — ALBUTEROL 2.5 MILLIGRAM(S): 90 AEROSOL, METERED ORAL at 22:41

## 2024-01-01 RX ADMIN — Medication 10 MILLIGRAM(S): at 10:09

## 2024-01-01 RX ADMIN — Medication 100 MILLIGRAM(S): at 21:57

## 2024-01-01 RX ADMIN — CHOLESTYRAMINE 4 GRAM(S): 4 POWDER, FOR SUSPENSION ORAL at 14:30

## 2024-01-01 RX ADMIN — CHOLESTYRAMINE 4 GRAM(S): 4 POWDER, FOR SUSPENSION ORAL at 18:17

## 2024-01-01 RX ADMIN — CHOLESTYRAMINE 4 GRAM(S): 4 POWDER, FOR SUSPENSION ORAL at 08:47

## 2024-01-01 RX ADMIN — Medication 10 MILLIGRAM(S): at 06:11

## 2024-01-01 RX ADMIN — Medication 5 MILLIGRAM(S): at 00:02

## 2024-01-01 RX ADMIN — Medication 4: at 06:14

## 2024-01-01 RX ADMIN — I.V. FAT EMULSION 20.83 GM/KG/DAY: 20 EMULSION INTRAVENOUS at 17:24

## 2024-01-01 RX ADMIN — GABAPENTIN 100 MILLIGRAM(S): 400 CAPSULE ORAL at 22:02

## 2024-01-01 RX ADMIN — CEFEPIME 100 MILLIGRAM(S): 1 INJECTION, POWDER, FOR SOLUTION INTRAMUSCULAR; INTRAVENOUS at 22:16

## 2024-01-01 RX ADMIN — CHOLESTYRAMINE 4 GRAM(S): 4 POWDER, FOR SUSPENSION ORAL at 17:54

## 2024-01-01 RX ADMIN — I.V. FAT EMULSION 20.83 GM/KG/DAY: 20 EMULSION INTRAVENOUS at 18:45

## 2024-01-01 RX ADMIN — Medication 225 MILLIGRAM(S): at 13:49

## 2024-01-01 RX ADMIN — Medication 1 MILLIGRAM(S): at 22:17

## 2024-01-01 RX ADMIN — ERGOCALCIFEROL 50000 UNIT(S): 1.25 CAPSULE ORAL at 09:35

## 2024-01-01 RX ADMIN — Medication 2: at 11:41

## 2024-01-01 RX ADMIN — CARVEDILOL PHOSPHATE 6.25 MILLIGRAM(S): 80 CAPSULE, EXTENDED RELEASE ORAL at 21:08

## 2024-01-01 RX ADMIN — HYDROMORPHONE HYDROCHLORIDE 0.25 MILLIGRAM(S): 2 INJECTION INTRAMUSCULAR; INTRAVENOUS; SUBCUTANEOUS at 13:00

## 2024-01-01 RX ADMIN — Medication 50 MICROGRAM(S): at 06:48

## 2024-01-01 RX ADMIN — Medication 1 EACH: at 19:36

## 2024-01-01 RX ADMIN — URSODIOL 300 MILLIGRAM(S): 250 TABLET, FILM COATED ORAL at 14:51

## 2024-01-01 RX ADMIN — Medication 8: at 01:29

## 2024-01-01 RX ADMIN — HYDROMORPHONE HYDROCHLORIDE 1.5 MG/HR: 2 INJECTION INTRAMUSCULAR; INTRAVENOUS; SUBCUTANEOUS at 13:14

## 2024-01-01 RX ADMIN — Medication 1 MILLIGRAM(S): at 01:16

## 2024-01-01 RX ADMIN — Medication 150 MILLIGRAM(S): at 12:13

## 2024-01-01 RX ADMIN — Medication 225 MILLIGRAM(S): at 12:29

## 2024-01-01 RX ADMIN — Medication 100 MILLIGRAM(S): at 05:29

## 2024-01-01 RX ADMIN — ENOXAPARIN SODIUM 90 MILLIGRAM(S): 100 INJECTION SUBCUTANEOUS at 22:04

## 2024-01-01 RX ADMIN — Medication 10 MILLIGRAM(S): at 11:11

## 2024-01-01 RX ADMIN — HYDROMORPHONE HYDROCHLORIDE 0.25 MILLIGRAM(S): 2 INJECTION INTRAMUSCULAR; INTRAVENOUS; SUBCUTANEOUS at 10:24

## 2024-01-01 RX ADMIN — Medication 10 MILLIGRAM(S): at 21:20

## 2024-01-01 RX ADMIN — Medication 2: at 11:40

## 2024-01-01 RX ADMIN — CHLORHEXIDINE GLUCONATE 1 APPLICATION(S): 213 SOLUTION TOPICAL at 06:59

## 2024-01-01 RX ADMIN — URSODIOL 300 MILLIGRAM(S): 250 TABLET, FILM COATED ORAL at 06:58

## 2024-01-01 RX ADMIN — Medication 50 MILLIGRAM(S): at 05:36

## 2024-01-01 RX ADMIN — URSODIOL 300 MILLIGRAM(S): 250 TABLET, FILM COATED ORAL at 05:33

## 2024-01-01 RX ADMIN — Medication 20 MILLIGRAM(S): at 09:49

## 2024-01-01 RX ADMIN — ENOXAPARIN SODIUM 90 MILLIGRAM(S): 100 INJECTION SUBCUTANEOUS at 11:53

## 2024-01-01 RX ADMIN — Medication 1 DROP(S): at 17:34

## 2024-01-01 RX ADMIN — I.V. FAT EMULSION 20.83 GM/KG/DAY: 20 EMULSION INTRAVENOUS at 17:39

## 2024-01-01 RX ADMIN — SODIUM CHLORIDE 1000 MILLILITER(S): 9 INJECTION INTRAMUSCULAR; INTRAVENOUS; SUBCUTANEOUS at 05:39

## 2024-01-01 RX ADMIN — Medication 40 MILLIGRAM(S): at 21:32

## 2024-01-01 RX ADMIN — ENOXAPARIN SODIUM 40 MILLIGRAM(S): 100 INJECTION SUBCUTANEOUS at 10:45

## 2024-01-01 RX ADMIN — FENTANYL CITRATE 25 MICROGRAM(S): 50 INJECTION INTRAVENOUS at 13:07

## 2024-01-01 RX ADMIN — I.V. FAT EMULSION 20.83 GM/KG/DAY: 20 EMULSION INTRAVENOUS at 18:22

## 2024-01-01 RX ADMIN — FENTANYL CITRATE 25 MICROGRAM(S): 50 INJECTION INTRAVENOUS at 12:47

## 2024-01-01 RX ADMIN — ERGOCALCIFEROL 50000 UNIT(S): 1.25 CAPSULE ORAL at 11:04

## 2024-01-01 RX ADMIN — Medication 3 MILLILITER(S): at 16:00

## 2024-01-01 RX ADMIN — Medication 5 MILLIGRAM(S): at 21:52

## 2024-01-01 RX ADMIN — SODIUM CHLORIDE 1000 MILLILITER(S): 9 INJECTION INTRAMUSCULAR; INTRAVENOUS; SUBCUTANEOUS at 15:29

## 2024-01-01 RX ADMIN — Medication 10 MILLIGRAM(S): at 23:15

## 2024-01-01 RX ADMIN — Medication 50 MICROGRAM(S): at 05:14

## 2024-01-01 RX ADMIN — Medication 1 MILLIGRAM(S): at 00:02

## 2024-01-01 RX ADMIN — Medication 3 MILLILITER(S): at 11:32

## 2024-01-01 RX ADMIN — URSODIOL 300 MILLIGRAM(S): 250 TABLET, FILM COATED ORAL at 14:50

## 2024-01-01 RX ADMIN — Medication 1 CAPSULE(S): at 08:19

## 2024-01-01 RX ADMIN — Medication 10 MILLIGRAM(S): at 02:53

## 2024-01-01 RX ADMIN — Medication 40 MICROGRAM(S): at 06:33

## 2024-01-01 RX ADMIN — Medication 100 MILLIGRAM(S): at 14:08

## 2024-01-01 RX ADMIN — Medication 50 MILLIGRAM(S): at 18:26

## 2024-01-01 RX ADMIN — CEFEPIME 100 MILLIGRAM(S): 1 INJECTION, POWDER, FOR SOLUTION INTRAMUSCULAR; INTRAVENOUS at 22:53

## 2024-01-01 RX ADMIN — Medication 10 MILLIGRAM(S): at 06:49

## 2024-01-01 RX ADMIN — ONDANSETRON 4 MILLIGRAM(S): 8 TABLET, FILM COATED ORAL at 05:06

## 2024-01-01 RX ADMIN — Medication 1 CAPSULE(S): at 17:01

## 2024-01-01 RX ADMIN — GABAPENTIN 100 MILLIGRAM(S): 400 CAPSULE ORAL at 22:07

## 2024-01-01 RX ADMIN — I.V. FAT EMULSION 20.83 GM/KG/DAY: 20 EMULSION INTRAVENOUS at 17:53

## 2024-01-01 RX ADMIN — CHLORHEXIDINE GLUCONATE 1 APPLICATION(S): 213 SOLUTION TOPICAL at 05:15

## 2024-01-01 RX ADMIN — URSODIOL 300 MILLIGRAM(S): 250 TABLET, FILM COATED ORAL at 22:32

## 2024-01-01 RX ADMIN — CHOLESTYRAMINE 4 GRAM(S): 4 POWDER, FOR SUSPENSION ORAL at 17:26

## 2024-01-01 RX ADMIN — Medication 1 EACH: at 17:49

## 2024-01-01 RX ADMIN — Medication 5 MILLIGRAM(S): at 21:00

## 2024-01-01 RX ADMIN — Medication 1 EACH: at 17:50

## 2024-01-01 RX ADMIN — CHOLESTYRAMINE 4 GRAM(S): 4 POWDER, FOR SUSPENSION ORAL at 12:08

## 2024-01-01 RX ADMIN — Medication 2: at 11:56

## 2024-01-01 RX ADMIN — Medication 7.5 MILLIGRAM(S): at 18:12

## 2024-01-01 RX ADMIN — Medication 1: at 06:54

## 2024-01-01 RX ADMIN — SODIUM CHLORIDE 1000 MILLILITER(S): 9 INJECTION, SOLUTION INTRAVENOUS at 22:19

## 2024-01-01 RX ADMIN — ONDANSETRON 4 MILLIGRAM(S): 8 TABLET, FILM COATED ORAL at 17:57

## 2024-01-01 RX ADMIN — Medication 100 MILLIEQUIVALENT(S): at 18:06

## 2024-01-01 RX ADMIN — Medication 10 MILLIGRAM(S): at 06:05

## 2024-01-01 RX ADMIN — Medication 2: at 06:56

## 2024-01-01 RX ADMIN — Medication 1 EACH: at 17:55

## 2024-01-01 RX ADMIN — ONDANSETRON 4 MILLIGRAM(S): 8 TABLET, FILM COATED ORAL at 11:13

## 2024-01-01 RX ADMIN — Medication 1 MILLIGRAM(S): at 21:34

## 2024-01-01 RX ADMIN — ATORVASTATIN CALCIUM 20 MILLIGRAM(S): 80 TABLET, FILM COATED ORAL at 21:17

## 2024-01-01 RX ADMIN — HEPARIN SODIUM 5000 UNIT(S): 5000 INJECTION INTRAVENOUS; SUBCUTANEOUS at 21:26

## 2024-01-01 RX ADMIN — Medication 2: at 12:23

## 2024-01-01 RX ADMIN — Medication 10 MILLIGRAM(S): at 18:11

## 2024-01-01 RX ADMIN — Medication 10 MILLIGRAM(S): at 21:56

## 2024-01-01 RX ADMIN — Medication 3 MILLILITER(S): at 06:20

## 2024-01-01 RX ADMIN — Medication 50 MILLIGRAM(S): at 06:07

## 2024-01-01 RX ADMIN — CHOLESTYRAMINE 4 GRAM(S): 4 POWDER, FOR SUSPENSION ORAL at 16:31

## 2024-01-01 RX ADMIN — URSODIOL 300 MILLIGRAM(S): 250 TABLET, FILM COATED ORAL at 22:11

## 2024-01-01 RX ADMIN — Medication 125 MILLILITER(S): at 04:59

## 2024-01-01 RX ADMIN — ROBINUL 0.4 MILLIGRAM(S): 0.2 INJECTION INTRAMUSCULAR; INTRAVENOUS at 12:39

## 2024-01-01 RX ADMIN — URSODIOL 300 MILLIGRAM(S): 250 TABLET, FILM COATED ORAL at 22:03

## 2024-01-01 RX ADMIN — URSODIOL 300 MILLIGRAM(S): 250 TABLET, FILM COATED ORAL at 14:42

## 2024-01-01 RX ADMIN — Medication 100 MILLIGRAM(S): at 18:05

## 2024-01-01 RX ADMIN — I.V. FAT EMULSION 20.83 GM/KG/DAY: 20 EMULSION INTRAVENOUS at 19:19

## 2024-01-01 RX ADMIN — TEDUGLUTIDE 4.7 MILLIGRAM(S): KIT at 11:34

## 2024-01-01 RX ADMIN — Medication 1 EACH: at 18:11

## 2024-01-01 RX ADMIN — Medication 100 MILLIEQUIVALENT(S): at 06:06

## 2024-01-01 RX ADMIN — Medication 10 MILLIGRAM(S): at 11:28

## 2024-01-01 RX ADMIN — Medication 3 MILLILITER(S): at 22:38

## 2024-01-01 RX ADMIN — Medication 10 MILLIGRAM(S): at 21:50

## 2024-01-01 RX ADMIN — TEDUGLUTIDE 4.7 MILLIGRAM(S): KIT at 11:10

## 2024-01-01 RX ADMIN — ATORVASTATIN CALCIUM 20 MILLIGRAM(S): 80 TABLET, FILM COATED ORAL at 21:30

## 2024-01-01 RX ADMIN — CHLORHEXIDINE GLUCONATE 1 APPLICATION(S): 213 SOLUTION TOPICAL at 05:14

## 2024-01-01 RX ADMIN — Medication 10 MILLIGRAM(S): at 16:33

## 2024-01-01 RX ADMIN — ONDANSETRON 4 MILLIGRAM(S): 8 TABLET, FILM COATED ORAL at 15:40

## 2024-01-01 RX ADMIN — CHOLESTYRAMINE 4 GRAM(S): 4 POWDER, FOR SUSPENSION ORAL at 17:59

## 2024-01-01 RX ADMIN — Medication 1 CAPSULE(S): at 11:49

## 2024-01-01 RX ADMIN — Medication 1 MILLIGRAM(S): at 21:25

## 2024-01-01 RX ADMIN — CEFEPIME 100 MILLIGRAM(S): 1 INJECTION, POWDER, FOR SOLUTION INTRAMUSCULAR; INTRAVENOUS at 19:40

## 2024-01-01 RX ADMIN — ENOXAPARIN SODIUM 40 MILLIGRAM(S): 100 INJECTION SUBCUTANEOUS at 11:28

## 2024-01-01 RX ADMIN — Medication 100 MILLIGRAM(S): at 18:20

## 2024-01-01 RX ADMIN — ENOXAPARIN SODIUM 40 MILLIGRAM(S): 100 INJECTION SUBCUTANEOUS at 12:20

## 2024-01-01 RX ADMIN — IOHEXOL 30 MILLILITER(S): 300 INJECTION, SOLUTION INTRAVENOUS at 03:58

## 2024-01-01 RX ADMIN — GABAPENTIN 100 MILLIGRAM(S): 400 CAPSULE ORAL at 21:10

## 2024-01-01 RX ADMIN — CHLORHEXIDINE GLUCONATE 1 APPLICATION(S): 213 SOLUTION TOPICAL at 11:46

## 2024-01-01 RX ADMIN — Medication 5 MILLIGRAM(S): at 03:53

## 2024-01-01 RX ADMIN — Medication 40 MICROGRAM(S): at 05:39

## 2024-01-01 RX ADMIN — URSODIOL 300 MILLIGRAM(S): 250 TABLET, FILM COATED ORAL at 05:39

## 2024-01-01 RX ADMIN — Medication 0.5 MILLIGRAM(S): at 02:26

## 2024-01-01 RX ADMIN — URSODIOL 300 MILLIGRAM(S): 250 TABLET, FILM COATED ORAL at 22:07

## 2024-01-01 RX ADMIN — Medication 100 MILLIGRAM(S): at 18:11

## 2024-01-01 RX ADMIN — Medication 300 MILLIGRAM(S): at 17:01

## 2024-01-01 RX ADMIN — ONDANSETRON 4 MILLIGRAM(S): 8 TABLET, FILM COATED ORAL at 02:41

## 2024-01-01 RX ADMIN — Medication 166.67 MILLIGRAM(S): at 00:05

## 2024-01-01 RX ADMIN — ENOXAPARIN SODIUM 40 MILLIGRAM(S): 100 INJECTION SUBCUTANEOUS at 10:36

## 2024-01-01 RX ADMIN — Medication 150 MILLIGRAM(S): at 12:11

## 2024-01-01 RX ADMIN — Medication 1 CAPSULE(S): at 12:55

## 2024-01-01 RX ADMIN — Medication 1 CAPSULE(S): at 07:46

## 2024-01-01 RX ADMIN — Medication 10 MILLIGRAM(S): at 21:57

## 2024-01-01 RX ADMIN — Medication 1: at 17:03

## 2024-01-01 RX ADMIN — Medication 0.25 MILLIGRAM(S): at 02:28

## 2024-01-01 RX ADMIN — Medication 30 MICROGRAM(S): at 22:53

## 2024-01-01 RX ADMIN — CHLORHEXIDINE GLUCONATE 1 APPLICATION(S): 213 SOLUTION TOPICAL at 05:37

## 2024-01-01 RX ADMIN — Medication 100 GRAM(S): at 10:21

## 2024-01-01 RX ADMIN — Medication 300 MILLIGRAM(S): at 06:08

## 2024-01-01 RX ADMIN — Medication 100 MILLIGRAM(S): at 16:27

## 2024-01-01 RX ADMIN — ENOXAPARIN SODIUM 40 MILLIGRAM(S): 100 INJECTION SUBCUTANEOUS at 10:03

## 2024-01-01 RX ADMIN — HYDROMORPHONE HYDROCHLORIDE 0.25 MILLIGRAM(S): 2 INJECTION INTRAMUSCULAR; INTRAVENOUS; SUBCUTANEOUS at 09:51

## 2024-01-01 RX ADMIN — Medication 0.25 MILLIGRAM(S): at 22:23

## 2024-01-01 RX ADMIN — ENOXAPARIN SODIUM 40 MILLIGRAM(S): 100 INJECTION SUBCUTANEOUS at 11:34

## 2024-01-01 RX ADMIN — Medication 300 MILLIGRAM(S): at 05:54

## 2024-01-01 RX ADMIN — HEPARIN SODIUM 5000 UNIT(S): 5000 INJECTION INTRAVENOUS; SUBCUTANEOUS at 21:30

## 2024-01-01 RX ADMIN — Medication 1 CAPSULE(S): at 12:24

## 2024-01-01 RX ADMIN — URSODIOL 300 MILLIGRAM(S): 250 TABLET, FILM COATED ORAL at 06:30

## 2024-01-01 RX ADMIN — ENOXAPARIN SODIUM 40 MILLIGRAM(S): 100 INJECTION SUBCUTANEOUS at 11:06

## 2024-01-01 RX ADMIN — CARVEDILOL PHOSPHATE 3.12 MILLIGRAM(S): 80 CAPSULE, EXTENDED RELEASE ORAL at 10:42

## 2024-01-01 RX ADMIN — CHLORHEXIDINE GLUCONATE 1 APPLICATION(S): 213 SOLUTION TOPICAL at 06:44

## 2024-01-01 RX ADMIN — Medication 3 MILLILITER(S): at 16:25

## 2024-01-01 RX ADMIN — ENOXAPARIN SODIUM 40 MILLIGRAM(S): 100 INJECTION SUBCUTANEOUS at 17:51

## 2024-01-01 RX ADMIN — Medication 300 MILLIGRAM(S): at 12:16

## 2024-01-01 RX ADMIN — Medication 1 EACH: at 18:45

## 2024-01-01 RX ADMIN — CHOLESTYRAMINE 4 GRAM(S): 4 POWDER, FOR SUSPENSION ORAL at 15:07

## 2024-01-01 RX ADMIN — CHLORHEXIDINE GLUCONATE 1 APPLICATION(S): 213 SOLUTION TOPICAL at 06:17

## 2024-01-01 RX ADMIN — Medication 1 CAPSULE(S): at 07:23

## 2024-01-01 RX ADMIN — Medication 100 MILLIGRAM(S): at 05:58

## 2024-01-01 RX ADMIN — GABAPENTIN 100 MILLIGRAM(S): 400 CAPSULE ORAL at 21:29

## 2024-01-01 RX ADMIN — Medication 20 MILLIGRAM(S): at 00:21

## 2024-01-01 RX ADMIN — SODIUM CHLORIDE 2000 MILLILITER(S): 9 INJECTION INTRAMUSCULAR; INTRAVENOUS; SUBCUTANEOUS at 12:36

## 2024-01-01 RX ADMIN — Medication 1 DROP(S): at 15:00

## 2024-01-01 RX ADMIN — Medication 3 MILLILITER(S): at 06:09

## 2024-01-01 RX ADMIN — CHOLESTYRAMINE 4 GRAM(S): 4 POWDER, FOR SUSPENSION ORAL at 17:34

## 2024-01-01 RX ADMIN — CHLORHEXIDINE GLUCONATE 1 APPLICATION(S): 213 SOLUTION TOPICAL at 05:40

## 2024-01-01 RX ADMIN — Medication 5 MILLIGRAM(S): at 22:19

## 2024-01-01 RX ADMIN — Medication 3 MILLILITER(S): at 21:06

## 2024-01-01 RX ADMIN — Medication 50 MILLIGRAM(S): at 05:53

## 2024-01-01 RX ADMIN — URSODIOL 300 MILLIGRAM(S): 250 TABLET, FILM COATED ORAL at 21:31

## 2024-01-01 RX ADMIN — URSODIOL 300 MILLIGRAM(S): 250 TABLET, FILM COATED ORAL at 06:41

## 2024-01-01 RX ADMIN — Medication 5 MILLIGRAM(S): at 00:37

## 2024-01-01 RX ADMIN — I.V. FAT EMULSION 20.83 GM/KG/DAY: 20 EMULSION INTRAVENOUS at 17:45

## 2024-01-01 RX ADMIN — Medication 50 MILLIEQUIVALENT(S): at 07:47

## 2024-01-01 RX ADMIN — Medication 1 EACH: at 17:27

## 2024-01-01 RX ADMIN — URSODIOL 300 MILLIGRAM(S): 250 TABLET, FILM COATED ORAL at 07:21

## 2024-01-01 RX ADMIN — Medication 100 MILLIGRAM(S): at 05:35

## 2024-01-01 RX ADMIN — Medication 300 MILLIGRAM(S): at 11:59

## 2024-01-01 RX ADMIN — CHLORHEXIDINE GLUCONATE 1 APPLICATION(S): 213 SOLUTION TOPICAL at 05:33

## 2024-01-01 RX ADMIN — Medication 650 MILLIGRAM(S): at 03:38

## 2024-01-01 RX ADMIN — URSODIOL 300 MILLIGRAM(S): 250 TABLET, FILM COATED ORAL at 05:19

## 2024-01-01 RX ADMIN — ERYTHROPOIETIN 3000 UNIT(S): 10000 INJECTION, SOLUTION INTRAVENOUS; SUBCUTANEOUS at 09:40

## 2024-01-01 RX ADMIN — Medication 50 MILLIEQUIVALENT(S): at 13:04

## 2024-01-01 RX ADMIN — Medication 1 APPLICATION(S): at 05:49

## 2024-01-01 RX ADMIN — Medication 10 MILLIGRAM(S): at 22:10

## 2024-01-01 RX ADMIN — URSODIOL 300 MILLIGRAM(S): 250 TABLET, FILM COATED ORAL at 22:48

## 2024-01-01 RX ADMIN — Medication 40 MICROGRAM(S): at 06:07

## 2024-01-01 RX ADMIN — CHLORHEXIDINE GLUCONATE 1 APPLICATION(S): 213 SOLUTION TOPICAL at 08:33

## 2024-01-01 RX ADMIN — Medication 20 MILLIGRAM(S): at 13:24

## 2024-01-01 RX ADMIN — Medication 1 EACH: at 17:41

## 2024-01-01 RX ADMIN — CHOLESTYRAMINE 4 GRAM(S): 4 POWDER, FOR SUSPENSION ORAL at 15:11

## 2024-01-01 RX ADMIN — HYDROMORPHONE HYDROCHLORIDE 0.25 MILLIGRAM(S): 2 INJECTION INTRAMUSCULAR; INTRAVENOUS; SUBCUTANEOUS at 11:00

## 2024-01-01 RX ADMIN — Medication 1 EACH: at 17:18

## 2024-01-01 RX ADMIN — I.V. FAT EMULSION 20.83 GM/KG/DAY: 20 EMULSION INTRAVENOUS at 17:06

## 2024-01-01 RX ADMIN — ATORVASTATIN CALCIUM 20 MILLIGRAM(S): 80 TABLET, FILM COATED ORAL at 22:07

## 2024-01-01 RX ADMIN — Medication 50 MILLILITER(S): at 13:17

## 2024-01-01 RX ADMIN — Medication 0.25 MILLIGRAM(S): at 00:24

## 2024-01-01 RX ADMIN — Medication 1: at 17:23

## 2024-01-01 RX ADMIN — Medication 5 MILLIGRAM(S): at 20:56

## 2024-01-01 RX ADMIN — CHOLESTYRAMINE 4 GRAM(S): 4 POWDER, FOR SUSPENSION ORAL at 14:25

## 2024-01-01 RX ADMIN — URSODIOL 300 MILLIGRAM(S): 250 TABLET, FILM COATED ORAL at 14:18

## 2024-01-01 RX ADMIN — Medication 150 MILLIGRAM(S): at 06:54

## 2024-01-01 RX ADMIN — CARVEDILOL PHOSPHATE 6.25 MILLIGRAM(S): 80 CAPSULE, EXTENDED RELEASE ORAL at 21:28

## 2024-01-01 RX ADMIN — Medication 6 MILLIGRAM(S): at 08:39

## 2024-01-01 RX ADMIN — I.V. FAT EMULSION 20.83 GM/KG/DAY: 20 EMULSION INTRAVENOUS at 17:55

## 2024-01-01 RX ADMIN — Medication 1 DROP(S): at 15:47

## 2024-01-01 RX ADMIN — Medication 300 MILLIGRAM(S): at 11:32

## 2024-01-01 RX ADMIN — HYDROMORPHONE HYDROCHLORIDE 0.25 MILLIGRAM(S): 2 INJECTION INTRAMUSCULAR; INTRAVENOUS; SUBCUTANEOUS at 10:39

## 2024-01-01 RX ADMIN — Medication 225 MILLIGRAM(S): at 11:52

## 2024-01-01 RX ADMIN — CHLORHEXIDINE GLUCONATE 1 APPLICATION(S): 213 SOLUTION TOPICAL at 06:13

## 2024-01-01 RX ADMIN — Medication 3 MILLILITER(S): at 11:49

## 2024-01-01 RX ADMIN — Medication 1: at 12:07

## 2024-01-01 RX ADMIN — Medication 5 MILLIGRAM(S): at 01:11

## 2024-01-01 RX ADMIN — Medication 3 MILLILITER(S): at 15:39

## 2024-01-01 RX ADMIN — CHOLESTYRAMINE 4 GRAM(S): 4 POWDER, FOR SUSPENSION ORAL at 13:39

## 2024-01-01 RX ADMIN — Medication 10 MILLIGRAM(S): at 21:10

## 2024-01-01 RX ADMIN — Medication 100 MILLIGRAM(S): at 05:27

## 2024-01-01 RX ADMIN — BENZOCAINE AND MENTHOL 1 LOZENGE: 5; 1 LIQUID ORAL at 13:18

## 2024-01-01 RX ADMIN — URSODIOL 300 MILLIGRAM(S): 250 TABLET, FILM COATED ORAL at 06:21

## 2024-01-01 RX ADMIN — CHOLESTYRAMINE 4 GRAM(S): 4 POWDER, FOR SUSPENSION ORAL at 17:11

## 2024-01-01 RX ADMIN — CHOLESTYRAMINE 4 GRAM(S): 4 POWDER, FOR SUSPENSION ORAL at 16:33

## 2024-01-01 RX ADMIN — ERGOCALCIFEROL 50000 UNIT(S): 1.25 CAPSULE ORAL at 11:07

## 2024-01-01 RX ADMIN — Medication 1 DROP(S): at 17:25

## 2024-01-01 RX ADMIN — Medication 1 CAPSULE(S): at 07:34

## 2024-01-01 RX ADMIN — GABAPENTIN 100 MILLIGRAM(S): 400 CAPSULE ORAL at 21:25

## 2024-01-01 RX ADMIN — Medication 1 CAPSULE(S): at 17:20

## 2024-01-01 RX ADMIN — CHLORHEXIDINE GLUCONATE 1 APPLICATION(S): 213 SOLUTION TOPICAL at 06:49

## 2024-01-01 RX ADMIN — ONDANSETRON 4 MILLIGRAM(S): 8 TABLET, FILM COATED ORAL at 14:36

## 2024-01-01 RX ADMIN — Medication 30 MICROGRAM(S): at 22:18

## 2024-01-01 RX ADMIN — ERGOCALCIFEROL 50000 UNIT(S): 1.25 CAPSULE ORAL at 10:25

## 2024-01-01 RX ADMIN — Medication 40 MILLIGRAM(S): at 18:17

## 2024-01-01 RX ADMIN — Medication 1 TABLET(S): at 12:55

## 2024-01-01 RX ADMIN — ATORVASTATIN CALCIUM 20 MILLIGRAM(S): 80 TABLET, FILM COATED ORAL at 21:07

## 2024-01-01 RX ADMIN — Medication 100 MILLIGRAM(S): at 18:00

## 2024-01-01 RX ADMIN — Medication 7.5 MILLIGRAM(S): at 06:12

## 2024-01-01 RX ADMIN — URSODIOL 300 MILLIGRAM(S): 250 TABLET, FILM COATED ORAL at 22:30

## 2024-01-01 RX ADMIN — Medication 7.5 MILLIGRAM(S): at 21:47

## 2024-01-01 RX ADMIN — GABAPENTIN 100 MILLIGRAM(S): 400 CAPSULE ORAL at 22:15

## 2024-01-01 RX ADMIN — URSODIOL 300 MILLIGRAM(S): 250 TABLET, FILM COATED ORAL at 14:49

## 2024-01-01 RX ADMIN — Medication 6 MILLIGRAM(S): at 06:48

## 2024-01-01 RX ADMIN — Medication 150 MILLIGRAM(S): at 12:28

## 2024-01-01 RX ADMIN — ATORVASTATIN CALCIUM 20 MILLIGRAM(S): 80 TABLET, FILM COATED ORAL at 22:50

## 2024-01-01 RX ADMIN — Medication 0.5 MILLIGRAM(S): at 21:03

## 2024-01-01 RX ADMIN — Medication 50 MICROGRAM(S): at 09:58

## 2024-01-01 RX ADMIN — CHOLESTYRAMINE 4 GRAM(S): 4 POWDER, FOR SUSPENSION ORAL at 09:43

## 2024-01-01 RX ADMIN — Medication 20 MILLIGRAM(S): at 17:43

## 2024-01-01 RX ADMIN — REMDESIVIR 200 MILLIGRAM(S): 5 INJECTION INTRAVENOUS at 04:11

## 2024-01-01 RX ADMIN — Medication 1 CAPSULE(S): at 16:32

## 2024-01-01 RX ADMIN — Medication 2: at 21:33

## 2024-01-01 RX ADMIN — Medication 10 MILLIGRAM(S): at 10:16

## 2024-01-01 RX ADMIN — SODIUM CHLORIDE 50 MILLILITER(S): 9 INJECTION, SOLUTION INTRAVENOUS at 03:31

## 2024-01-01 RX ADMIN — CEFEPIME 12.5 MILLIGRAM(S): 1 INJECTION, POWDER, FOR SOLUTION INTRAMUSCULAR; INTRAVENOUS at 10:04

## 2024-01-01 RX ADMIN — TEDUGLUTIDE 4.7 MILLIGRAM(S): KIT at 11:41

## 2024-01-01 RX ADMIN — URSODIOL 300 MILLIGRAM(S): 250 TABLET, FILM COATED ORAL at 06:13

## 2024-01-01 RX ADMIN — Medication 100 MILLIGRAM(S): at 13:24

## 2024-01-01 RX ADMIN — Medication 50 MILLILITER(S): at 00:56

## 2024-01-01 RX ADMIN — SODIUM CHLORIDE 1000 MILLILITER(S): 9 INJECTION, SOLUTION INTRAVENOUS at 17:30

## 2024-01-01 RX ADMIN — Medication 1 CAPSULE(S): at 11:32

## 2024-01-01 RX ADMIN — Medication 300 MILLIGRAM(S): at 16:00

## 2024-01-01 RX ADMIN — Medication 5 MILLIGRAM(S): at 21:25

## 2024-01-01 RX ADMIN — Medication 1 DROP(S): at 11:27

## 2024-01-01 RX ADMIN — Medication 10 MILLIGRAM(S): at 05:39

## 2024-01-01 RX ADMIN — HYDROMORPHONE HYDROCHLORIDE 1 MILLIGRAM(S): 2 INJECTION INTRAMUSCULAR; INTRAVENOUS; SUBCUTANEOUS at 13:43

## 2024-01-01 RX ADMIN — CHOLESTYRAMINE 4 GRAM(S): 4 POWDER, FOR SUSPENSION ORAL at 17:12

## 2024-01-01 RX ADMIN — URSODIOL 300 MILLIGRAM(S): 250 TABLET, FILM COATED ORAL at 22:04

## 2024-01-01 RX ADMIN — Medication 100 MILLIGRAM(S): at 05:59

## 2024-01-01 RX ADMIN — CHLORHEXIDINE GLUCONATE 1 APPLICATION(S): 213 SOLUTION TOPICAL at 05:18

## 2024-01-01 RX ADMIN — CHLORHEXIDINE GLUCONATE 1 APPLICATION(S): 213 SOLUTION TOPICAL at 06:38

## 2024-01-01 RX ADMIN — ZOLPIDEM TARTRATE 5 MILLIGRAM(S): 10 TABLET ORAL at 23:34

## 2024-01-01 RX ADMIN — Medication 100 MILLIGRAM(S): at 00:25

## 2024-01-01 RX ADMIN — URSODIOL 300 MILLIGRAM(S): 250 TABLET, FILM COATED ORAL at 21:48

## 2024-01-01 RX ADMIN — Medication 300 MILLIGRAM(S): at 07:42

## 2024-01-01 RX ADMIN — URSODIOL 300 MILLIGRAM(S): 250 TABLET, FILM COATED ORAL at 05:53

## 2024-01-01 RX ADMIN — Medication 10 MILLIGRAM(S): at 05:03

## 2024-01-01 RX ADMIN — Medication 2: at 23:42

## 2024-01-01 RX ADMIN — Medication 50 MILLILITER(S): at 10:51

## 2024-01-01 RX ADMIN — Medication 0.5 MILLIGRAM(S): at 14:20

## 2024-01-01 RX ADMIN — Medication 1 DROP(S): at 19:37

## 2024-01-01 RX ADMIN — ATORVASTATIN CALCIUM 20 MILLIGRAM(S): 80 TABLET, FILM COATED ORAL at 22:35

## 2024-01-01 RX ADMIN — ATORVASTATIN CALCIUM 20 MILLIGRAM(S): 80 TABLET, FILM COATED ORAL at 22:22

## 2024-01-01 RX ADMIN — Medication 6 MILLIGRAM(S): at 05:46

## 2024-01-01 RX ADMIN — Medication 1: at 05:29

## 2024-01-01 RX ADMIN — Medication 1 MILLIGRAM(S): at 00:47

## 2024-01-01 RX ADMIN — Medication 225 MILLIGRAM(S): at 12:55

## 2024-01-01 RX ADMIN — Medication 5 MILLIGRAM(S): at 22:24

## 2024-01-01 RX ADMIN — Medication 1 CAPSULE(S): at 18:05

## 2024-01-01 RX ADMIN — Medication 100 MILLIGRAM(S): at 23:59

## 2024-01-01 RX ADMIN — Medication 1 EACH: at 18:08

## 2024-01-01 RX ADMIN — CHLORHEXIDINE GLUCONATE 1 APPLICATION(S): 213 SOLUTION TOPICAL at 06:25

## 2024-01-01 RX ADMIN — ATORVASTATIN CALCIUM 20 MILLIGRAM(S): 80 TABLET, FILM COATED ORAL at 21:25

## 2024-01-01 RX ADMIN — Medication 0.5 MILLIGRAM(S): at 23:40

## 2024-01-01 RX ADMIN — Medication 50 MILLIGRAM(S): at 12:21

## 2024-01-01 RX ADMIN — CHLORHEXIDINE GLUCONATE 1 APPLICATION(S): 213 SOLUTION TOPICAL at 06:30

## 2024-01-01 RX ADMIN — Medication 1 EACH: at 18:40

## 2024-01-01 RX ADMIN — BUMETANIDE 1 MILLIGRAM(S): 0.25 INJECTION INTRAMUSCULAR; INTRAVENOUS at 09:22

## 2024-01-01 RX ADMIN — Medication 1: at 06:03

## 2024-01-01 RX ADMIN — Medication 50 MILLILITER(S): at 12:16

## 2024-01-01 RX ADMIN — Medication 100 MILLIGRAM(S): at 21:08

## 2024-01-01 RX ADMIN — SODIUM CHLORIDE 500 MILLILITER(S): 9 INJECTION, SOLUTION INTRAVENOUS at 00:10

## 2024-01-01 RX ADMIN — Medication 10 MILLIGRAM(S): at 22:53

## 2024-01-01 RX ADMIN — Medication 100 MILLIGRAM(S): at 14:51

## 2024-01-01 RX ADMIN — Medication 1 CAPSULE(S): at 10:56

## 2024-01-01 RX ADMIN — I.V. FAT EMULSION 20.83 GM/KG/DAY: 20 EMULSION INTRAVENOUS at 17:33

## 2024-01-01 RX ADMIN — Medication 10 MILLIGRAM(S): at 07:21

## 2024-01-01 RX ADMIN — Medication 10 MILLIGRAM(S): at 17:28

## 2024-01-01 RX ADMIN — Medication 1 APPLICATION(S): at 17:34

## 2024-01-01 RX ADMIN — Medication 100 MILLIGRAM(S): at 06:11

## 2024-01-01 RX ADMIN — Medication 1 DROP(S): at 18:23

## 2024-01-01 RX ADMIN — URSODIOL 300 MILLIGRAM(S): 250 TABLET, FILM COATED ORAL at 21:08

## 2024-01-01 RX ADMIN — URSODIOL 300 MILLIGRAM(S): 250 TABLET, FILM COATED ORAL at 11:11

## 2024-01-01 RX ADMIN — Medication 3 MILLILITER(S): at 16:34

## 2024-01-01 RX ADMIN — URSODIOL 300 MILLIGRAM(S): 250 TABLET, FILM COATED ORAL at 13:19

## 2024-01-01 RX ADMIN — CHOLESTYRAMINE 4 GRAM(S): 4 POWDER, FOR SUSPENSION ORAL at 10:20

## 2024-01-01 RX ADMIN — CHLORHEXIDINE GLUCONATE 1 APPLICATION(S): 213 SOLUTION TOPICAL at 06:01

## 2024-01-01 RX ADMIN — Medication 3 MILLILITER(S): at 17:30

## 2024-01-01 RX ADMIN — I.V. FAT EMULSION 20.83 GM/KG/DAY: 20 EMULSION INTRAVENOUS at 18:52

## 2024-01-01 RX ADMIN — ONDANSETRON 4 MILLIGRAM(S): 8 TABLET, FILM COATED ORAL at 23:03

## 2024-01-01 RX ADMIN — Medication 50 MILLIGRAM(S): at 21:09

## 2024-01-01 RX ADMIN — Medication 5 MILLIGRAM(S): at 18:18

## 2024-01-01 RX ADMIN — Medication 25 MILLIGRAM(S): at 10:37

## 2024-01-01 RX ADMIN — Medication 0.25 MILLIGRAM(S): at 23:46

## 2024-01-01 RX ADMIN — Medication 20 MILLIGRAM(S): at 07:58

## 2024-01-01 RX ADMIN — CHOLESTYRAMINE 4 GRAM(S): 4 POWDER, FOR SUSPENSION ORAL at 11:01

## 2024-01-01 RX ADMIN — Medication 1 DROP(S): at 11:29

## 2024-01-01 RX ADMIN — Medication 1 CAPSULE(S): at 12:36

## 2024-01-01 RX ADMIN — Medication 300 MILLIGRAM(S): at 05:08

## 2024-01-01 RX ADMIN — Medication 7.5 MILLIGRAM(S): at 06:44

## 2024-01-01 RX ADMIN — HYDROMORPHONE HYDROCHLORIDE 0.5 MILLIGRAM(S): 2 INJECTION INTRAMUSCULAR; INTRAVENOUS; SUBCUTANEOUS at 17:15

## 2024-01-01 RX ADMIN — HEPARIN SODIUM 5000 UNIT(S): 5000 INJECTION INTRAVENOUS; SUBCUTANEOUS at 21:47

## 2024-01-01 RX ADMIN — Medication 50 MILLIGRAM(S): at 17:52

## 2024-01-01 RX ADMIN — URSODIOL 300 MILLIGRAM(S): 250 TABLET, FILM COATED ORAL at 11:13

## 2024-01-01 RX ADMIN — Medication 1 CAPSULE(S): at 13:22

## 2024-01-01 RX ADMIN — Medication 20 MILLIGRAM(S): at 08:50

## 2024-01-01 RX ADMIN — Medication 100 MILLIGRAM(S): at 10:25

## 2024-01-01 RX ADMIN — Medication 1 EACH: at 17:23

## 2024-01-01 RX ADMIN — Medication 8.93 MICROGRAM(S)/KG/MIN: at 13:29

## 2024-01-01 RX ADMIN — Medication 300 MILLIGRAM(S): at 05:07

## 2024-01-01 RX ADMIN — Medication 50 MILLIGRAM(S): at 11:34

## 2024-01-01 RX ADMIN — SODIUM CHLORIDE 2500 MILLILITER(S): 9 INJECTION INTRAMUSCULAR; INTRAVENOUS; SUBCUTANEOUS at 15:49

## 2024-01-01 RX ADMIN — Medication 10 MILLIGRAM(S): at 18:17

## 2024-01-01 RX ADMIN — ATORVASTATIN CALCIUM 20 MILLIGRAM(S): 80 TABLET, FILM COATED ORAL at 21:24

## 2024-01-01 RX ADMIN — Medication 50 MICROGRAM(S): at 05:18

## 2024-01-01 RX ADMIN — SODIUM CHLORIDE 1000 MILLILITER(S): 9 INJECTION, SOLUTION INTRAVENOUS at 19:52

## 2024-01-01 RX ADMIN — Medication 0.25 MILLIGRAM(S): at 07:31

## 2024-01-01 RX ADMIN — CHOLESTYRAMINE 4 GRAM(S): 4 POWDER, FOR SUSPENSION ORAL at 15:16

## 2024-01-01 RX ADMIN — Medication 100 MILLIGRAM(S): at 05:13

## 2024-01-01 RX ADMIN — Medication 1 EACH: at 17:33

## 2024-01-01 RX ADMIN — Medication 100 MILLIGRAM(S): at 05:00

## 2024-01-01 RX ADMIN — Medication 300 MILLIGRAM(S): at 13:40

## 2024-01-01 RX ADMIN — Medication 1 CAPSULE(S): at 14:25

## 2024-01-01 RX ADMIN — URSODIOL 300 MILLIGRAM(S): 250 TABLET, FILM COATED ORAL at 21:27

## 2024-01-01 RX ADMIN — Medication 50 MILLILITER(S): at 06:06

## 2024-01-01 RX ADMIN — Medication 7.5 MILLIGRAM(S): at 03:22

## 2024-01-01 RX ADMIN — Medication 0.5 MILLIGRAM(S): at 02:48

## 2024-01-01 RX ADMIN — Medication 1 EACH: at 17:15

## 2024-01-01 RX ADMIN — Medication 50 MILLILITER(S): at 14:31

## 2024-01-01 RX ADMIN — Medication 300 MILLIGRAM(S): at 05:28

## 2024-01-01 RX ADMIN — Medication 225 MILLIGRAM(S): at 12:22

## 2024-01-01 RX ADMIN — Medication 1 APPLICATION(S): at 07:30

## 2024-01-01 RX ADMIN — Medication 1 DROP(S): at 11:38

## 2024-01-01 RX ADMIN — I.V. FAT EMULSION 20.83 GM/KG/DAY: 20 EMULSION INTRAVENOUS at 20:07

## 2024-01-01 RX ADMIN — URSODIOL 300 MILLIGRAM(S): 250 TABLET, FILM COATED ORAL at 13:56

## 2024-01-01 RX ADMIN — URSODIOL 300 MILLIGRAM(S): 250 TABLET, FILM COATED ORAL at 06:34

## 2024-01-01 RX ADMIN — Medication 30 MICROGRAM(S): at 22:11

## 2024-01-01 RX ADMIN — Medication 10 MILLIGRAM(S): at 18:08

## 2024-01-01 RX ADMIN — HYDROMORPHONE HYDROCHLORIDE 0.5 MILLIGRAM(S): 2 INJECTION INTRAMUSCULAR; INTRAVENOUS; SUBCUTANEOUS at 10:13

## 2024-01-01 RX ADMIN — Medication 1 MILLIGRAM(S): at 22:57

## 2024-01-01 RX ADMIN — Medication 40 MILLIGRAM(S): at 15:16

## 2024-01-01 RX ADMIN — Medication 10 MILLIGRAM(S): at 17:55

## 2024-01-01 RX ADMIN — Medication 10 MILLIGRAM(S): at 00:54

## 2024-01-01 RX ADMIN — Medication 166.67 MILLIGRAM(S): at 19:20

## 2024-01-01 RX ADMIN — HEPARIN SODIUM 5000 UNIT(S): 5000 INJECTION INTRAVENOUS; SUBCUTANEOUS at 14:50

## 2024-01-01 RX ADMIN — TEDUGLUTIDE 2.35 MILLIGRAM(S): KIT at 11:37

## 2024-01-01 RX ADMIN — Medication 10 MILLIGRAM(S): at 12:45

## 2024-01-01 RX ADMIN — HEPARIN SODIUM 5000 UNIT(S): 5000 INJECTION INTRAVENOUS; SUBCUTANEOUS at 17:05

## 2024-01-01 RX ADMIN — Medication 100 MILLIGRAM(S): at 22:53

## 2024-01-01 RX ADMIN — Medication 50 MILLIGRAM(S): at 10:35

## 2024-01-01 RX ADMIN — Medication 1 EACH: at 18:00

## 2024-01-01 RX ADMIN — URSODIOL 300 MILLIGRAM(S): 250 TABLET, FILM COATED ORAL at 21:16

## 2024-01-01 RX ADMIN — Medication 1: at 07:41

## 2024-01-01 RX ADMIN — SIMETHICONE 80 MILLIGRAM(S): 80 TABLET, CHEWABLE ORAL at 21:16

## 2024-01-01 RX ADMIN — CHLORHEXIDINE GLUCONATE 1 APPLICATION(S): 213 SOLUTION TOPICAL at 05:39

## 2024-01-01 RX ADMIN — ENOXAPARIN SODIUM 40 MILLIGRAM(S): 100 INJECTION SUBCUTANEOUS at 11:46

## 2024-01-01 RX ADMIN — Medication 10 MILLIGRAM(S): at 09:57

## 2024-01-01 RX ADMIN — Medication 5 MILLIGRAM(S): at 00:21

## 2024-01-01 RX ADMIN — Medication 150 MILLIGRAM(S): at 05:53

## 2024-01-01 RX ADMIN — I.V. FAT EMULSION 20.83 GM/KG/DAY: 20 EMULSION INTRAVENOUS at 17:49

## 2024-01-01 RX ADMIN — Medication 40 MICROGRAM(S): at 05:46

## 2024-01-01 RX ADMIN — Medication 10 MILLIGRAM(S): at 02:00

## 2024-01-01 RX ADMIN — Medication 1 EACH: at 17:12

## 2024-01-01 RX ADMIN — CHOLESTYRAMINE 4 GRAM(S): 4 POWDER, FOR SUSPENSION ORAL at 10:14

## 2024-01-01 RX ADMIN — HYDROMORPHONE HYDROCHLORIDE 0.5 MILLIGRAM(S): 2 INJECTION INTRAMUSCULAR; INTRAVENOUS; SUBCUTANEOUS at 00:12

## 2024-01-01 RX ADMIN — Medication 3 MILLILITER(S): at 09:29

## 2024-01-01 RX ADMIN — Medication 40 MICROGRAM(S): at 05:23

## 2024-01-01 RX ADMIN — Medication 5 MILLIGRAM(S): at 21:11

## 2024-01-01 RX ADMIN — Medication 1 APPLICATION(S): at 05:26

## 2024-01-01 RX ADMIN — URSODIOL 300 MILLIGRAM(S): 250 TABLET, FILM COATED ORAL at 13:12

## 2024-01-01 RX ADMIN — Medication 50 MILLIGRAM(S): at 21:45

## 2024-01-01 RX ADMIN — ATORVASTATIN CALCIUM 20 MILLIGRAM(S): 80 TABLET, FILM COATED ORAL at 22:18

## 2024-01-01 RX ADMIN — Medication 40 MILLIGRAM(S): at 22:21

## 2024-01-01 RX ADMIN — ERGOCALCIFEROL 50000 UNIT(S): 1.25 CAPSULE ORAL at 17:21

## 2024-01-01 RX ADMIN — BUMETANIDE 1 MILLIGRAM(S): 0.25 INJECTION INTRAMUSCULAR; INTRAVENOUS at 20:52

## 2024-01-01 RX ADMIN — Medication 100 MILLIGRAM(S): at 12:00

## 2024-01-01 RX ADMIN — CHOLESTYRAMINE 4 GRAM(S): 4 POWDER, FOR SUSPENSION ORAL at 19:35

## 2024-01-01 RX ADMIN — Medication 1 CAPSULE(S): at 18:41

## 2024-01-01 RX ADMIN — Medication 50 MILLIGRAM(S): at 05:18

## 2024-01-01 RX ADMIN — Medication 1 APPLICATION(S): at 06:34

## 2024-01-01 RX ADMIN — Medication 100 MILLIEQUIVALENT(S): at 05:25

## 2024-01-01 RX ADMIN — CARVEDILOL PHOSPHATE 6.25 MILLIGRAM(S): 80 CAPSULE, EXTENDED RELEASE ORAL at 22:22

## 2024-01-01 RX ADMIN — Medication 300 MILLIGRAM(S): at 10:11

## 2024-01-01 RX ADMIN — Medication 1 EACH: at 17:53

## 2024-01-01 RX ADMIN — ONDANSETRON 4 MILLIGRAM(S): 8 TABLET, FILM COATED ORAL at 17:55

## 2024-01-01 RX ADMIN — Medication 50 MICROGRAM(S): at 05:06

## 2024-01-01 RX ADMIN — Medication 1 CAPSULE(S): at 07:47

## 2024-01-01 RX ADMIN — Medication 5 MILLIGRAM(S): at 02:31

## 2024-01-01 RX ADMIN — Medication 1 DROP(S): at 17:51

## 2024-01-01 RX ADMIN — Medication 10 MILLIGRAM(S): at 06:54

## 2024-01-01 RX ADMIN — Medication 150 MILLIGRAM(S): at 13:57

## 2024-01-01 RX ADMIN — ATORVASTATIN CALCIUM 20 MILLIGRAM(S): 80 TABLET, FILM COATED ORAL at 21:55

## 2024-01-01 RX ADMIN — Medication 100 MILLIGRAM(S): at 19:09

## 2024-01-01 RX ADMIN — CHOLESTYRAMINE 4 GRAM(S): 4 POWDER, FOR SUSPENSION ORAL at 16:18

## 2024-01-01 RX ADMIN — Medication 1 CAPSULE(S): at 17:58

## 2024-01-01 RX ADMIN — ONDANSETRON 4 MILLIGRAM(S): 8 TABLET, FILM COATED ORAL at 11:22

## 2024-01-01 RX ADMIN — HYDROMORPHONE HYDROCHLORIDE 0.25 MILLIGRAM(S): 2 INJECTION INTRAMUSCULAR; INTRAVENOUS; SUBCUTANEOUS at 10:05

## 2024-01-01 RX ADMIN — I.V. FAT EMULSION 20.8 GM/KG/DAY: 20 EMULSION INTRAVENOUS at 17:16

## 2024-01-01 RX ADMIN — ATORVASTATIN CALCIUM 20 MILLIGRAM(S): 80 TABLET, FILM COATED ORAL at 22:30

## 2024-01-01 RX ADMIN — I.V. FAT EMULSION 20.83 GM/KG/DAY: 20 EMULSION INTRAVENOUS at 18:08

## 2024-01-01 RX ADMIN — ATORVASTATIN CALCIUM 20 MILLIGRAM(S): 80 TABLET, FILM COATED ORAL at 21:52

## 2024-01-01 RX ADMIN — HYDROMORPHONE HYDROCHLORIDE 0.2 MILLIGRAM(S): 2 INJECTION INTRAMUSCULAR; INTRAVENOUS; SUBCUTANEOUS at 01:48

## 2024-01-01 RX ADMIN — Medication 3 MILLILITER(S): at 16:21

## 2024-01-01 RX ADMIN — Medication 50 MICROGRAM(S): at 06:25

## 2024-01-01 RX ADMIN — Medication 1 CAPSULE(S): at 17:32

## 2024-01-01 RX ADMIN — CHLORHEXIDINE GLUCONATE 1 APPLICATION(S): 213 SOLUTION TOPICAL at 22:22

## 2024-01-01 RX ADMIN — Medication 6: at 23:33

## 2024-01-01 RX ADMIN — MIDODRINE HYDROCHLORIDE 5 MILLIGRAM(S): 2.5 TABLET ORAL at 14:30

## 2024-01-01 RX ADMIN — Medication 300 MILLIGRAM(S): at 16:33

## 2024-01-01 RX ADMIN — Medication 100 MILLIGRAM(S): at 03:25

## 2024-01-01 RX ADMIN — Medication 7.5 MILLIGRAM(S): at 02:11

## 2024-01-01 RX ADMIN — Medication 100 MILLIGRAM(S): at 16:38

## 2024-01-01 RX ADMIN — Medication 3 MILLILITER(S): at 05:46

## 2024-01-01 RX ADMIN — CHLORHEXIDINE GLUCONATE 1 APPLICATION(S): 213 SOLUTION TOPICAL at 05:05

## 2024-01-01 RX ADMIN — Medication 1 CAPSULE(S): at 19:10

## 2024-01-01 RX ADMIN — CEFEPIME 12.5 MILLIGRAM(S): 1 INJECTION, POWDER, FOR SOLUTION INTRAMUSCULAR; INTRAVENOUS at 10:39

## 2024-01-01 RX ADMIN — Medication 1 CAPSULE(S): at 09:37

## 2024-01-01 RX ADMIN — REMDESIVIR 200 MILLIGRAM(S): 5 INJECTION INTRAVENOUS at 03:17

## 2024-01-01 RX ADMIN — Medication 10 MILLIGRAM(S): at 18:15

## 2024-01-01 RX ADMIN — URSODIOL 300 MILLIGRAM(S): 250 TABLET, FILM COATED ORAL at 11:50

## 2024-01-01 RX ADMIN — Medication 10 MILLIGRAM(S): at 16:19

## 2024-01-01 RX ADMIN — Medication 300 MILLIGRAM(S): at 11:01

## 2024-01-01 RX ADMIN — OCTREOTIDE ACETATE 10 MICROGRAM(S)/HR: 200 INJECTION, SOLUTION INTRAVENOUS; SUBCUTANEOUS at 15:58

## 2024-01-01 RX ADMIN — IRON SUCROSE 176.67 MILLIGRAM(S): 20 INJECTION, SOLUTION INTRAVENOUS at 10:13

## 2024-01-01 RX ADMIN — Medication 150 MILLIGRAM(S): at 12:21

## 2024-01-01 RX ADMIN — Medication 1 APPLICATION(S): at 11:06

## 2024-01-01 RX ADMIN — Medication 1 APPLICATION(S): at 18:18

## 2024-01-01 RX ADMIN — Medication 20 MILLIGRAM(S): at 23:06

## 2024-01-01 RX ADMIN — GABAPENTIN 100 MILLIGRAM(S): 400 CAPSULE ORAL at 21:54

## 2024-01-01 RX ADMIN — OCTREOTIDE ACETATE 10 MICROGRAM(S)/HR: 200 INJECTION, SOLUTION INTRAVENOUS; SUBCUTANEOUS at 06:10

## 2024-01-01 RX ADMIN — Medication 10 MILLIGRAM(S): at 17:59

## 2024-01-01 RX ADMIN — ONDANSETRON 4 MILLIGRAM(S): 8 TABLET, FILM COATED ORAL at 13:12

## 2024-01-01 RX ADMIN — Medication 1 EACH: at 17:22

## 2024-01-01 RX ADMIN — HYDROMORPHONE HYDROCHLORIDE 0.2 MILLIGRAM(S): 2 INJECTION INTRAMUSCULAR; INTRAVENOUS; SUBCUTANEOUS at 02:10

## 2024-01-01 RX ADMIN — CHOLESTYRAMINE 4 GRAM(S): 4 POWDER, FOR SUSPENSION ORAL at 14:16

## 2024-01-01 RX ADMIN — Medication 1 EACH: at 19:06

## 2024-01-01 RX ADMIN — Medication 0.25 MILLIGRAM(S): at 21:30

## 2024-01-01 RX ADMIN — Medication 150 MILLIGRAM(S): at 15:00

## 2024-01-01 RX ADMIN — Medication 1 CAPSULE(S): at 19:03

## 2024-01-01 RX ADMIN — Medication 4: at 23:21

## 2024-01-01 RX ADMIN — Medication 10 MILLIGRAM(S): at 19:04

## 2024-01-01 RX ADMIN — Medication 0.25 MILLIGRAM(S): at 22:30

## 2024-01-01 RX ADMIN — Medication 150 MILLIGRAM(S): at 12:41

## 2024-01-01 RX ADMIN — Medication 1 MILLIGRAM(S): at 01:01

## 2024-01-01 RX ADMIN — Medication 37.5 MICROGRAM(S): at 06:50

## 2024-01-01 RX ADMIN — Medication 5 MILLIGRAM(S): at 22:50

## 2024-01-01 RX ADMIN — Medication 1 CAPSULE(S): at 19:19

## 2024-01-01 RX ADMIN — Medication 10 MILLIGRAM(S): at 17:27

## 2024-01-01 RX ADMIN — Medication 1 EACH: at 19:48

## 2024-01-01 RX ADMIN — Medication 1 MILLIGRAM(S): at 22:44

## 2024-01-01 RX ADMIN — CEFEPIME 2000 MILLIGRAM(S): 1 INJECTION, POWDER, FOR SOLUTION INTRAMUSCULAR; INTRAVENOUS at 06:05

## 2024-01-01 RX ADMIN — Medication 40 MICROGRAM(S): at 08:54

## 2024-01-01 RX ADMIN — Medication 40 MILLIGRAM(S): at 18:15

## 2024-01-01 RX ADMIN — CHOLESTYRAMINE 4 GRAM(S): 4 POWDER, FOR SUSPENSION ORAL at 15:34

## 2024-01-01 RX ADMIN — URSODIOL 300 MILLIGRAM(S): 250 TABLET, FILM COATED ORAL at 21:34

## 2024-01-01 RX ADMIN — Medication 1 DROP(S): at 18:53

## 2024-01-01 RX ADMIN — Medication 300 MILLIGRAM(S): at 05:12

## 2024-01-01 RX ADMIN — URSODIOL 300 MILLIGRAM(S): 250 TABLET, FILM COATED ORAL at 21:20

## 2024-01-01 RX ADMIN — Medication 1 DROP(S): at 17:57

## 2024-01-01 RX ADMIN — CHOLESTYRAMINE 4 GRAM(S): 4 POWDER, FOR SUSPENSION ORAL at 17:18

## 2024-01-01 RX ADMIN — Medication 10 MILLIGRAM(S): at 06:56

## 2024-01-01 RX ADMIN — Medication 50 MILLILITER(S): at 06:40

## 2024-01-01 RX ADMIN — Medication 5 MILLIGRAM(S): at 22:58

## 2024-01-01 RX ADMIN — Medication 0.25 MILLIGRAM(S): at 00:51

## 2024-01-01 RX ADMIN — Medication 10 MILLIGRAM(S): at 13:56

## 2024-01-01 RX ADMIN — I.V. FAT EMULSION 20.83 GM/KG/DAY: 20 EMULSION INTRAVENOUS at 17:50

## 2024-01-01 RX ADMIN — URSODIOL 300 MILLIGRAM(S): 250 TABLET, FILM COATED ORAL at 13:13

## 2024-01-01 RX ADMIN — Medication 10 MILLIGRAM(S): at 19:38

## 2024-01-01 RX ADMIN — Medication 1 EACH: at 17:06

## 2024-01-01 RX ADMIN — Medication 1 CAPSULE(S): at 13:33

## 2024-01-01 RX ADMIN — ENOXAPARIN SODIUM 90 MILLIGRAM(S): 100 INJECTION SUBCUTANEOUS at 11:41

## 2024-01-01 RX ADMIN — Medication 1 MILLIGRAM(S): at 21:06

## 2024-01-01 RX ADMIN — Medication 3 MILLILITER(S): at 03:31

## 2024-01-01 RX ADMIN — Medication 1 CAPSULE(S): at 14:20

## 2024-01-01 RX ADMIN — Medication 1 APPLICATION(S): at 06:07

## 2024-01-01 RX ADMIN — Medication 1 EACH: at 17:59

## 2024-01-01 RX ADMIN — HEPARIN SODIUM 5000 UNIT(S): 5000 INJECTION INTRAVENOUS; SUBCUTANEOUS at 06:12

## 2024-01-01 RX ADMIN — Medication 1 CAPSULE(S): at 07:50

## 2024-01-01 RX ADMIN — CHLORHEXIDINE GLUCONATE 1 APPLICATION(S): 213 SOLUTION TOPICAL at 05:08

## 2024-01-01 RX ADMIN — Medication 0.25 MILLIGRAM(S): at 20:56

## 2024-01-01 RX ADMIN — CHOLESTYRAMINE 4 GRAM(S): 4 POWDER, FOR SUSPENSION ORAL at 17:45

## 2024-01-01 RX ADMIN — Medication 3 MILLILITER(S): at 04:59

## 2024-01-01 RX ADMIN — Medication 3 MILLILITER(S): at 17:12

## 2024-01-01 RX ADMIN — Medication 10 MILLIGRAM(S): at 12:23

## 2024-01-01 RX ADMIN — I.V. FAT EMULSION 20.83 GM/KG/DAY: 20 EMULSION INTRAVENOUS at 17:56

## 2024-01-01 RX ADMIN — ONDANSETRON 4 MILLIGRAM(S): 8 TABLET, FILM COATED ORAL at 19:21

## 2024-01-01 RX ADMIN — Medication 100 MILLIGRAM(S): at 05:42

## 2024-01-01 RX ADMIN — Medication 1 DROP(S): at 18:08

## 2024-01-01 RX ADMIN — Medication 10 MILLIGRAM(S): at 13:22

## 2024-01-01 RX ADMIN — CHLORHEXIDINE GLUCONATE 1 APPLICATION(S): 213 SOLUTION TOPICAL at 05:45

## 2024-01-01 RX ADMIN — SODIUM CHLORIDE 90 MILLILITER(S): 9 INJECTION INTRAMUSCULAR; INTRAVENOUS; SUBCUTANEOUS at 04:04

## 2024-01-01 RX ADMIN — HYDROMORPHONE HYDROCHLORIDE 0.5 MILLIGRAM(S): 2 INJECTION INTRAMUSCULAR; INTRAVENOUS; SUBCUTANEOUS at 02:50

## 2024-01-01 RX ADMIN — Medication 40 MILLIGRAM(S): at 06:01

## 2024-01-01 RX ADMIN — URSODIOL 300 MILLIGRAM(S): 250 TABLET, FILM COATED ORAL at 20:04

## 2024-01-01 RX ADMIN — Medication 150 MILLIGRAM(S): at 11:29

## 2024-01-01 RX ADMIN — Medication 3 MILLILITER(S): at 19:45

## 2024-01-01 RX ADMIN — GABAPENTIN 100 MILLIGRAM(S): 400 CAPSULE ORAL at 21:30

## 2024-01-01 RX ADMIN — Medication 50 MICROGRAM(S): at 06:06

## 2024-01-01 RX ADMIN — URSODIOL 300 MILLIGRAM(S): 250 TABLET, FILM COATED ORAL at 15:05

## 2024-01-01 RX ADMIN — ATORVASTATIN CALCIUM 20 MILLIGRAM(S): 80 TABLET, FILM COATED ORAL at 21:45

## 2024-01-01 RX ADMIN — ATORVASTATIN CALCIUM 20 MILLIGRAM(S): 80 TABLET, FILM COATED ORAL at 21:43

## 2024-01-01 RX ADMIN — Medication 1 EACH: at 17:51

## 2024-01-01 RX ADMIN — Medication 100 MILLIGRAM(S): at 13:32

## 2024-01-01 RX ADMIN — Medication 5 MILLIGRAM(S): at 21:08

## 2024-01-01 RX ADMIN — Medication 100 MILLIGRAM(S): at 00:34

## 2024-01-01 RX ADMIN — Medication 10 MILLIGRAM(S): at 21:32

## 2024-01-01 RX ADMIN — Medication 50 MICROGRAM(S): at 06:20

## 2024-01-01 RX ADMIN — SODIUM CHLORIDE 500 MILLILITER(S): 9 INJECTION, SOLUTION INTRAVENOUS at 16:41

## 2024-01-01 RX ADMIN — REMDESIVIR 200 MILLIGRAM(S): 5 INJECTION INTRAVENOUS at 02:19

## 2024-01-01 RX ADMIN — Medication 0.25 MILLIGRAM(S): at 21:48

## 2024-01-01 RX ADMIN — Medication 1 CAPSULE(S): at 09:12

## 2024-01-01 RX ADMIN — CHOLESTYRAMINE 4 GRAM(S): 4 POWDER, FOR SUSPENSION ORAL at 14:02

## 2024-01-01 RX ADMIN — ATORVASTATIN CALCIUM 20 MILLIGRAM(S): 80 TABLET, FILM COATED ORAL at 21:56

## 2024-01-01 RX ADMIN — SODIUM CHLORIDE 500 MILLILITER(S): 9 INJECTION, SOLUTION INTRAVENOUS at 03:05

## 2024-01-01 RX ADMIN — Medication 3 MILLILITER(S): at 21:30

## 2024-01-01 RX ADMIN — Medication 10 MILLIGRAM(S): at 17:05

## 2024-01-01 RX ADMIN — CHOLESTYRAMINE 4 GRAM(S): 4 POWDER, FOR SUSPENSION ORAL at 17:28

## 2024-01-01 RX ADMIN — Medication 1 CAPSULE(S): at 10:20

## 2024-01-01 RX ADMIN — Medication 5 MILLIGRAM(S): at 18:00

## 2024-01-01 RX ADMIN — URSODIOL 300 MILLIGRAM(S): 250 TABLET, FILM COATED ORAL at 21:43

## 2024-01-01 RX ADMIN — Medication 0.25 MILLIGRAM(S): at 01:49

## 2024-01-01 RX ADMIN — GABAPENTIN 100 MILLIGRAM(S): 400 CAPSULE ORAL at 22:04

## 2024-01-01 RX ADMIN — ERYTHROPOIETIN 10000 UNIT(S): 10000 INJECTION, SOLUTION INTRAVENOUS; SUBCUTANEOUS at 11:29

## 2024-01-01 RX ADMIN — URSODIOL 300 MILLIGRAM(S): 250 TABLET, FILM COATED ORAL at 13:32

## 2024-01-01 RX ADMIN — ERYTHROPOIETIN 10000 UNIT(S): 10000 INJECTION, SOLUTION INTRAVENOUS; SUBCUTANEOUS at 04:31

## 2024-01-01 RX ADMIN — ATORVASTATIN CALCIUM 20 MILLIGRAM(S): 80 TABLET, FILM COATED ORAL at 23:44

## 2024-01-01 RX ADMIN — Medication 5 MILLIGRAM(S): at 03:05

## 2024-01-01 RX ADMIN — GABAPENTIN 100 MILLIGRAM(S): 400 CAPSULE ORAL at 22:12

## 2024-01-01 RX ADMIN — Medication 10 MILLIGRAM(S): at 05:32

## 2024-01-01 RX ADMIN — Medication 100 MILLIGRAM(S): at 15:15

## 2024-01-01 RX ADMIN — CHLORHEXIDINE GLUCONATE 1 APPLICATION(S): 213 SOLUTION TOPICAL at 06:31

## 2024-01-01 RX ADMIN — Medication 100 MILLIGRAM(S): at 22:27

## 2024-01-01 RX ADMIN — Medication 50 MILLIGRAM(S): at 05:31

## 2024-01-01 RX ADMIN — Medication 100 MILLIGRAM(S): at 16:07

## 2024-01-01 RX ADMIN — ENOXAPARIN SODIUM 40 MILLIGRAM(S): 100 INJECTION SUBCUTANEOUS at 12:23

## 2024-01-01 RX ADMIN — Medication 10 MILLIGRAM(S): at 17:25

## 2024-01-01 RX ADMIN — Medication 2: at 00:04

## 2024-01-01 RX ADMIN — CEFEPIME 12.5 MILLIGRAM(S): 1 INJECTION, POWDER, FOR SOLUTION INTRAMUSCULAR; INTRAVENOUS at 21:59

## 2024-01-01 RX ADMIN — Medication 1 EACH: at 18:21

## 2024-01-01 RX ADMIN — Medication 2: at 06:15

## 2024-01-01 RX ADMIN — CARVEDILOL PHOSPHATE 6.25 MILLIGRAM(S): 80 CAPSULE, EXTENDED RELEASE ORAL at 11:14

## 2024-01-01 RX ADMIN — Medication 6 MILLIGRAM(S): at 06:52

## 2024-01-01 RX ADMIN — Medication 100 MILLIGRAM(S): at 18:56

## 2024-01-01 RX ADMIN — I.V. FAT EMULSION 20.83 GM/KG/DAY: 20 EMULSION INTRAVENOUS at 19:06

## 2024-01-01 RX ADMIN — Medication 1 APPLICATION(S): at 05:00

## 2024-01-01 RX ADMIN — CHOLESTYRAMINE 4 GRAM(S): 4 POWDER, FOR SUSPENSION ORAL at 10:45

## 2024-01-01 RX ADMIN — HYDROMORPHONE HYDROCHLORIDE 0.5 MILLIGRAM(S): 2 INJECTION INTRAMUSCULAR; INTRAVENOUS; SUBCUTANEOUS at 03:20

## 2024-01-01 RX ADMIN — URSODIOL 300 MILLIGRAM(S): 250 TABLET, FILM COATED ORAL at 13:24

## 2024-01-01 RX ADMIN — I.V. FAT EMULSION 20.8 GM/KG/DAY: 20 EMULSION INTRAVENOUS at 17:12

## 2024-01-01 RX ADMIN — URSODIOL 300 MILLIGRAM(S): 250 TABLET, FILM COATED ORAL at 14:21

## 2024-01-01 RX ADMIN — ONDANSETRON 4 MILLIGRAM(S): 8 TABLET, FILM COATED ORAL at 06:50

## 2024-01-01 RX ADMIN — Medication 0.25 MILLIGRAM(S): at 02:09

## 2024-01-01 RX ADMIN — CHOLESTYRAMINE 4 GRAM(S): 4 POWDER, FOR SUSPENSION ORAL at 10:12

## 2024-01-01 RX ADMIN — URSODIOL 300 MILLIGRAM(S): 250 TABLET, FILM COATED ORAL at 05:55

## 2024-01-01 RX ADMIN — I.V. FAT EMULSION 20.83 GM/KG/DAY: 20 EMULSION INTRAVENOUS at 19:45

## 2024-01-01 RX ADMIN — INSULIN HUMAN 5 UNIT(S): 100 INJECTION, SOLUTION SUBCUTANEOUS at 23:41

## 2024-01-01 RX ADMIN — Medication 50 MICROGRAM(S): at 05:47

## 2024-01-01 RX ADMIN — Medication 1 DROP(S): at 17:36

## 2024-01-01 RX ADMIN — Medication 100 MILLIGRAM(S): at 07:43

## 2024-01-01 RX ADMIN — Medication 150 MILLIGRAM(S): at 13:07

## 2024-01-01 RX ADMIN — Medication 0.25 MILLIGRAM(S): at 22:04

## 2024-01-01 RX ADMIN — Medication 10 MILLIGRAM(S): at 05:19

## 2024-01-01 RX ADMIN — I.V. FAT EMULSION 21 GM/KG/DAY: 20 EMULSION INTRAVENOUS at 17:35

## 2024-01-01 RX ADMIN — I.V. FAT EMULSION 20.83 GM/KG/DAY: 20 EMULSION INTRAVENOUS at 17:12

## 2024-01-01 RX ADMIN — Medication 10 MILLIGRAM(S): at 22:05

## 2024-01-01 RX ADMIN — I.V. FAT EMULSION 20.8 GM/KG/DAY: 20 EMULSION INTRAVENOUS at 17:33

## 2024-01-01 RX ADMIN — HYDROMORPHONE HYDROCHLORIDE 0.2 MILLIGRAM(S): 2 INJECTION INTRAMUSCULAR; INTRAVENOUS; SUBCUTANEOUS at 12:43

## 2024-01-01 RX ADMIN — Medication 5 MILLIGRAM(S): at 23:12

## 2024-01-01 RX ADMIN — Medication 10 MILLIGRAM(S): at 06:26

## 2024-01-01 RX ADMIN — ERGOCALCIFEROL 50000 UNIT(S): 1.25 CAPSULE ORAL at 15:11

## 2024-01-01 RX ADMIN — POTASSIUM PHOSPHATE, MONOBASIC POTASSIUM PHOSPHATE, DIBASIC 83.33 MILLIMOLE(S): 236; 224 INJECTION, SOLUTION INTRAVENOUS at 08:58

## 2024-01-01 RX ADMIN — Medication 100 MILLIGRAM(S): at 04:11

## 2024-01-01 RX ADMIN — Medication 30 MICROGRAM(S): at 21:56

## 2024-01-01 RX ADMIN — ACETAZOLAMIDE 105 MILLIGRAM(S): 250 TABLET ORAL at 11:11

## 2024-01-01 RX ADMIN — TEDUGLUTIDE 4.7 MILLIGRAM(S): KIT at 12:52

## 2024-01-01 RX ADMIN — SODIUM CHLORIDE 75 MILLILITER(S): 9 INJECTION, SOLUTION INTRAVENOUS at 05:51

## 2024-01-01 RX ADMIN — Medication 300 MILLIGRAM(S): at 05:35

## 2024-01-01 RX ADMIN — Medication 100 MILLIGRAM(S): at 06:34

## 2024-01-01 RX ADMIN — Medication 1 CAPSULE(S): at 07:36

## 2024-01-01 RX ADMIN — GABAPENTIN 100 MILLIGRAM(S): 400 CAPSULE ORAL at 22:48

## 2024-01-01 RX ADMIN — URSODIOL 300 MILLIGRAM(S): 250 TABLET, FILM COATED ORAL at 15:10

## 2024-01-01 RX ADMIN — Medication 25 MILLIGRAM(S): at 09:58

## 2024-01-01 RX ADMIN — Medication 1 APPLICATION(S): at 05:27

## 2024-01-01 RX ADMIN — CHLORHEXIDINE GLUCONATE 1 APPLICATION(S): 213 SOLUTION TOPICAL at 07:24

## 2024-01-01 RX ADMIN — Medication 5 MILLIGRAM(S): at 00:25

## 2024-01-01 RX ADMIN — Medication 1 EACH: at 17:38

## 2024-01-01 RX ADMIN — Medication 50 MILLIEQUIVALENT(S): at 10:09

## 2024-01-01 RX ADMIN — Medication 300 MILLIGRAM(S): at 16:10

## 2024-01-01 RX ADMIN — Medication 100 MILLIGRAM(S): at 10:20

## 2024-01-01 RX ADMIN — ENOXAPARIN SODIUM 40 MILLIGRAM(S): 100 INJECTION SUBCUTANEOUS at 10:11

## 2024-01-01 RX ADMIN — Medication 5 MILLIGRAM(S): at 21:03

## 2024-01-01 RX ADMIN — Medication 5 MILLIGRAM(S): at 23:45

## 2024-01-01 RX ADMIN — SODIUM CHLORIDE 1000 MILLILITER(S): 9 INJECTION, SOLUTION INTRAVENOUS at 21:33

## 2024-01-01 RX ADMIN — CHOLESTYRAMINE 4 GRAM(S): 4 POWDER, FOR SUSPENSION ORAL at 14:05

## 2024-01-01 RX ADMIN — CEFEPIME 12.5 MILLIGRAM(S): 1 INJECTION, POWDER, FOR SOLUTION INTRAMUSCULAR; INTRAVENOUS at 22:14

## 2024-01-01 RX ADMIN — Medication 1 EACH: at 18:24

## 2024-01-01 RX ADMIN — Medication 50 MICROGRAM(S): at 05:36

## 2024-01-01 RX ADMIN — Medication 5 MILLIGRAM(S): at 21:14

## 2024-01-01 RX ADMIN — Medication 5 MILLIGRAM(S): at 22:35

## 2024-01-01 RX ADMIN — Medication 100 MILLIGRAM(S): at 22:06

## 2024-01-01 RX ADMIN — Medication 37.5 MICROGRAM(S): at 06:23

## 2024-01-01 RX ADMIN — I.V. FAT EMULSION 20.83 GM/KG/DAY: 20 EMULSION INTRAVENOUS at 18:47

## 2024-01-01 RX ADMIN — Medication 1 EACH: at 18:19

## 2024-01-01 RX ADMIN — Medication 1 CAPSULE(S): at 07:54

## 2024-01-01 RX ADMIN — CHLORHEXIDINE GLUCONATE 1 APPLICATION(S): 213 SOLUTION TOPICAL at 06:35

## 2024-01-01 RX ADMIN — Medication 50 MILLIGRAM(S): at 21:12

## 2024-01-01 RX ADMIN — Medication 100 GRAM(S): at 01:47

## 2024-01-01 RX ADMIN — Medication 50 MILLIGRAM(S): at 17:29

## 2024-01-01 RX ADMIN — URSODIOL 300 MILLIGRAM(S): 250 TABLET, FILM COATED ORAL at 05:04

## 2024-01-01 RX ADMIN — Medication 30 MICROGRAM(S): at 00:21

## 2024-01-01 RX ADMIN — Medication 10 MILLIGRAM(S): at 12:38

## 2024-01-01 RX ADMIN — Medication 1 APPLICATION(S): at 05:55

## 2024-01-01 RX ADMIN — Medication 1: at 12:50

## 2024-01-01 RX ADMIN — Medication 10 MILLIGRAM(S): at 11:17

## 2024-01-01 RX ADMIN — URSODIOL 300 MILLIGRAM(S): 250 TABLET, FILM COATED ORAL at 14:14

## 2024-01-01 RX ADMIN — Medication 40 MILLIGRAM(S): at 10:45

## 2024-01-01 RX ADMIN — Medication 1 CAPSULE(S): at 18:49

## 2024-01-01 RX ADMIN — CHOLESTYRAMINE 4 GRAM(S): 4 POWDER, FOR SUSPENSION ORAL at 17:23

## 2024-01-01 RX ADMIN — ONDANSETRON 4 MILLIGRAM(S): 8 TABLET, FILM COATED ORAL at 05:05

## 2024-01-01 RX ADMIN — Medication 3 MILLILITER(S): at 15:01

## 2024-01-01 RX ADMIN — ONDANSETRON 4 MILLIGRAM(S): 8 TABLET, FILM COATED ORAL at 15:36

## 2024-01-01 RX ADMIN — Medication 300 MILLIGRAM(S): at 05:16

## 2024-01-01 RX ADMIN — Medication 1 CAPSULE(S): at 12:08

## 2024-01-01 RX ADMIN — Medication 50 MILLILITER(S): at 23:49

## 2024-01-01 RX ADMIN — Medication 3 MILLILITER(S): at 06:02

## 2024-01-01 RX ADMIN — Medication 3 MILLILITER(S): at 09:14

## 2024-01-01 RX ADMIN — REMDESIVIR 200 MILLIGRAM(S): 5 INJECTION INTRAVENOUS at 04:22

## 2024-01-01 RX ADMIN — Medication 40 MILLIGRAM(S): at 10:48

## 2024-01-01 RX ADMIN — Medication 10 MILLIGRAM(S): at 10:08

## 2024-01-01 RX ADMIN — Medication 3 MILLILITER(S): at 06:23

## 2024-01-01 RX ADMIN — CEFEPIME 100 MILLIGRAM(S): 1 INJECTION, POWDER, FOR SOLUTION INTRAMUSCULAR; INTRAVENOUS at 09:05

## 2024-01-01 RX ADMIN — Medication 1 MILLIGRAM(S): at 22:11

## 2024-01-01 RX ADMIN — URSODIOL 300 MILLIGRAM(S): 250 TABLET, FILM COATED ORAL at 06:09

## 2024-01-01 RX ADMIN — Medication 150 MILLIGRAM(S): at 11:07

## 2024-01-01 RX ADMIN — Medication 30 MICROGRAM(S): at 21:45

## 2024-01-01 RX ADMIN — ONDANSETRON 4 MILLIGRAM(S): 8 TABLET, FILM COATED ORAL at 10:29

## 2024-01-01 RX ADMIN — I.V. FAT EMULSION 20.83 GM/KG/DAY: 20 EMULSION INTRAVENOUS at 18:53

## 2024-01-01 RX ADMIN — CARVEDILOL PHOSPHATE 6.25 MILLIGRAM(S): 80 CAPSULE, EXTENDED RELEASE ORAL at 10:14

## 2024-01-01 RX ADMIN — URSODIOL 300 MILLIGRAM(S): 250 TABLET, FILM COATED ORAL at 00:25

## 2024-01-01 RX ADMIN — Medication 1 MILLIGRAM(S): at 21:11

## 2024-01-01 RX ADMIN — Medication 3 MILLILITER(S): at 06:32

## 2024-01-01 RX ADMIN — Medication 40 MILLIGRAM(S): at 14:23

## 2024-01-01 RX ADMIN — URSODIOL 300 MILLIGRAM(S): 250 TABLET, FILM COATED ORAL at 05:52

## 2024-01-01 RX ADMIN — ENOXAPARIN SODIUM 40 MILLIGRAM(S): 100 INJECTION SUBCUTANEOUS at 11:40

## 2024-01-01 RX ADMIN — ZOLPIDEM TARTRATE 10 MILLIGRAM(S): 10 TABLET ORAL at 23:16

## 2024-01-01 RX ADMIN — ONDANSETRON 4 MILLIGRAM(S): 8 TABLET, FILM COATED ORAL at 06:18

## 2024-01-01 RX ADMIN — Medication 30 MICROGRAM(S): at 22:25

## 2024-01-01 RX ADMIN — CHLORHEXIDINE GLUCONATE 1 APPLICATION(S): 213 SOLUTION TOPICAL at 06:24

## 2024-01-01 RX ADMIN — ONDANSETRON 4 MILLIGRAM(S): 8 TABLET, FILM COATED ORAL at 17:05

## 2024-01-01 RX ADMIN — Medication 50 MILLILITER(S): at 05:13

## 2024-01-01 RX ADMIN — CARVEDILOL PHOSPHATE 6.25 MILLIGRAM(S): 80 CAPSULE, EXTENDED RELEASE ORAL at 10:08

## 2024-01-01 RX ADMIN — Medication 10 MILLIGRAM(S): at 11:14

## 2024-01-01 RX ADMIN — Medication 100 MILLIEQUIVALENT(S): at 07:21

## 2024-01-01 RX ADMIN — URSODIOL 300 MILLIGRAM(S): 250 TABLET, FILM COATED ORAL at 22:15

## 2024-01-01 RX ADMIN — CHLORHEXIDINE GLUCONATE 1 APPLICATION(S): 213 SOLUTION TOPICAL at 06:53

## 2024-01-01 RX ADMIN — Medication 30 MICROGRAM(S): at 21:08

## 2024-01-01 RX ADMIN — Medication 300 MILLIGRAM(S): at 05:36

## 2024-01-01 RX ADMIN — Medication 50 MILLIGRAM(S): at 09:11

## 2024-01-01 RX ADMIN — Medication 30 MICROGRAM(S): at 22:43

## 2024-01-01 RX ADMIN — URSODIOL 300 MILLIGRAM(S): 250 TABLET, FILM COATED ORAL at 06:37

## 2024-01-01 RX ADMIN — Medication 1 EACH: at 18:47

## 2024-01-01 RX ADMIN — Medication 1 CAPSULE(S): at 11:41

## 2024-01-01 RX ADMIN — Medication 1 EACH: at 17:45

## 2024-01-01 RX ADMIN — ERYTHROPOIETIN 10000 UNIT(S): 10000 INJECTION, SOLUTION INTRAVENOUS; SUBCUTANEOUS at 13:50

## 2024-01-01 RX ADMIN — Medication 1 DROP(S): at 19:55

## 2024-01-01 RX ADMIN — I.V. FAT EMULSION 20.83 GM/KG/DAY: 20 EMULSION INTRAVENOUS at 17:36

## 2024-01-01 RX ADMIN — I.V. FAT EMULSION 20.83 GM/KG/DAY: 20 EMULSION INTRAVENOUS at 18:14

## 2024-01-01 RX ADMIN — Medication 1 MILLIGRAM(S): at 23:11

## 2024-01-01 RX ADMIN — Medication 1: at 12:09

## 2024-01-01 RX ADMIN — Medication 50 MILLILITER(S): at 17:54

## 2024-01-01 RX ADMIN — CEFEPIME 2000 MILLIGRAM(S): 1 INJECTION, POWDER, FOR SOLUTION INTRAMUSCULAR; INTRAVENOUS at 06:59

## 2024-01-01 RX ADMIN — Medication 3 MILLILITER(S): at 09:35

## 2024-01-01 RX ADMIN — Medication 40 MILLIGRAM(S): at 15:57

## 2024-01-01 RX ADMIN — CHOLESTYRAMINE 4 GRAM(S): 4 POWDER, FOR SUSPENSION ORAL at 16:27

## 2024-01-01 RX ADMIN — Medication 100 MILLIGRAM(S): at 21:24

## 2024-01-01 RX ADMIN — URSODIOL 300 MILLIGRAM(S): 250 TABLET, FILM COATED ORAL at 21:25

## 2024-01-01 RX ADMIN — Medication 10 MILLIGRAM(S): at 13:19

## 2024-01-01 RX ADMIN — Medication 100 MILLIGRAM(S): at 19:19

## 2024-01-01 RX ADMIN — URSODIOL 300 MILLIGRAM(S): 250 TABLET, FILM COATED ORAL at 15:17

## 2024-01-01 RX ADMIN — TEDUGLUTIDE 4.7 MILLIGRAM(S): KIT at 11:47

## 2024-01-01 RX ADMIN — GABAPENTIN 100 MILLIGRAM(S): 400 CAPSULE ORAL at 21:20

## 2024-01-01 RX ADMIN — Medication 150 MILLIGRAM(S): at 11:14

## 2024-01-01 RX ADMIN — Medication 3 MILLILITER(S): at 03:04

## 2024-01-01 RX ADMIN — Medication 100 MILLIGRAM(S): at 21:47

## 2024-01-01 RX ADMIN — Medication 150 MILLIGRAM(S): at 11:48

## 2024-01-01 RX ADMIN — Medication 5 MILLIGRAM(S): at 00:18

## 2024-01-01 RX ADMIN — ENOXAPARIN SODIUM 40 MILLIGRAM(S): 100 INJECTION SUBCUTANEOUS at 10:00

## 2024-01-01 RX ADMIN — URSODIOL 300 MILLIGRAM(S): 250 TABLET, FILM COATED ORAL at 19:27

## 2024-01-01 RX ADMIN — Medication 10 MILLIGRAM(S): at 15:13

## 2024-01-01 RX ADMIN — Medication 150 MILLIGRAM(S): at 12:05

## 2024-01-01 RX ADMIN — Medication 0.25 MILLIGRAM(S): at 00:07

## 2024-01-01 RX ADMIN — Medication 10 MILLIGRAM(S): at 09:50

## 2024-01-01 RX ADMIN — BENZOCAINE AND MENTHOL 1 LOZENGE: 5; 1 LIQUID ORAL at 12:25

## 2024-01-01 RX ADMIN — Medication 100 MILLIEQUIVALENT(S): at 18:05

## 2024-01-01 RX ADMIN — I.V. FAT EMULSION 20.83 GM/KG/DAY: 20 EMULSION INTRAVENOUS at 18:10

## 2024-01-01 RX ADMIN — Medication 1 CAPSULE(S): at 19:06

## 2024-01-01 RX ADMIN — ENOXAPARIN SODIUM 40 MILLIGRAM(S): 100 INJECTION SUBCUTANEOUS at 10:24

## 2024-01-01 RX ADMIN — Medication 30 MICROGRAM(S): at 21:51

## 2024-01-01 RX ADMIN — Medication 300 MILLIGRAM(S): at 05:14

## 2024-01-01 RX ADMIN — Medication 7.5 MILLIGRAM(S): at 22:33

## 2024-01-01 RX ADMIN — Medication 10 MILLIGRAM(S): at 21:55

## 2024-01-01 RX ADMIN — Medication 1 EACH: at 22:29

## 2024-01-01 RX ADMIN — URSODIOL 300 MILLIGRAM(S): 250 TABLET, FILM COATED ORAL at 13:49

## 2024-01-01 RX ADMIN — Medication 1 CAPSULE(S): at 10:27

## 2024-01-01 RX ADMIN — Medication 0.25 MILLIGRAM(S): at 23:45

## 2024-01-01 RX ADMIN — CHOLESTYRAMINE 4 GRAM(S): 4 POWDER, FOR SUSPENSION ORAL at 18:24

## 2024-01-01 RX ADMIN — Medication 1 DROP(S): at 13:15

## 2024-01-01 RX ADMIN — BENZOCAINE AND MENTHOL 1 LOZENGE: 5; 1 LIQUID ORAL at 17:39

## 2024-01-01 RX ADMIN — URSODIOL 300 MILLIGRAM(S): 250 TABLET, FILM COATED ORAL at 13:23

## 2024-01-01 RX ADMIN — CHOLESTYRAMINE 4 GRAM(S): 4 POWDER, FOR SUSPENSION ORAL at 18:07

## 2024-01-01 RX ADMIN — Medication 6 MILLIGRAM(S): at 03:57

## 2024-01-01 RX ADMIN — Medication 150 MILLIGRAM(S): at 11:04

## 2024-01-01 RX ADMIN — CHLORHEXIDINE GLUCONATE 1 APPLICATION(S): 213 SOLUTION TOPICAL at 07:13

## 2024-01-01 RX ADMIN — Medication 3 MILLILITER(S): at 09:42

## 2024-01-01 RX ADMIN — I.V. FAT EMULSION 20.83 GM/KG/DAY: 20 EMULSION INTRAVENOUS at 17:02

## 2024-01-01 RX ADMIN — CARVEDILOL PHOSPHATE 6.25 MILLIGRAM(S): 80 CAPSULE, EXTENDED RELEASE ORAL at 22:06

## 2024-01-01 RX ADMIN — CEFEPIME 100 MILLIGRAM(S): 1 INJECTION, POWDER, FOR SOLUTION INTRAMUSCULAR; INTRAVENOUS at 18:10

## 2024-01-01 RX ADMIN — Medication 50 MILLILITER(S): at 23:23

## 2024-01-01 RX ADMIN — Medication 300 MILLIGRAM(S): at 06:23

## 2024-01-01 RX ADMIN — Medication 40 MILLIGRAM(S): at 14:35

## 2024-01-01 RX ADMIN — HYDROMORPHONE HYDROCHLORIDE 0.25 MILLIGRAM(S): 2 INJECTION INTRAMUSCULAR; INTRAVENOUS; SUBCUTANEOUS at 12:05

## 2024-01-01 RX ADMIN — CHLORHEXIDINE GLUCONATE 1 APPLICATION(S): 213 SOLUTION TOPICAL at 06:58

## 2024-01-01 RX ADMIN — Medication 300 MILLIGRAM(S): at 11:23

## 2024-01-01 RX ADMIN — Medication 50 MICROGRAM(S): at 05:32

## 2024-01-01 RX ADMIN — ZOLPIDEM TARTRATE 5 MILLIGRAM(S): 10 TABLET ORAL at 00:54

## 2024-01-01 RX ADMIN — I.V. FAT EMULSION 20.83 GM/KG/DAY: 20 EMULSION INTRAVENOUS at 19:36

## 2024-01-01 RX ADMIN — ROBINUL 0.4 MILLIGRAM(S): 0.2 INJECTION INTRAMUSCULAR; INTRAVENOUS at 02:48

## 2024-01-01 RX ADMIN — I.V. FAT EMULSION 20.83 GM/KG/DAY: 20 EMULSION INTRAVENOUS at 18:00

## 2024-01-01 RX ADMIN — CHOLESTYRAMINE 4 GRAM(S): 4 POWDER, FOR SUSPENSION ORAL at 17:17

## 2024-01-01 RX ADMIN — Medication 1 DROP(S): at 18:33

## 2024-01-01 RX ADMIN — FENTANYL CITRATE 25 MICROGRAM(S): 50 INJECTION INTRAVENOUS at 13:15

## 2024-01-01 RX ADMIN — Medication 100 MILLIGRAM(S): at 19:03

## 2024-01-01 RX ADMIN — Medication 10 MILLIGRAM(S): at 02:08

## 2024-01-01 RX ADMIN — Medication 10 MILLIGRAM(S): at 06:12

## 2024-01-01 RX ADMIN — Medication 1 APPLICATION(S): at 19:19

## 2024-01-01 RX ADMIN — Medication 50 MILLIGRAM(S): at 11:06

## 2024-01-01 RX ADMIN — FENTANYL CITRATE 25 MICROGRAM(S): 50 INJECTION INTRAVENOUS at 13:00

## 2024-01-01 RX ADMIN — CHLORHEXIDINE GLUCONATE 1 APPLICATION(S): 213 SOLUTION TOPICAL at 09:21

## 2024-01-01 RX ADMIN — Medication 1 CAPSULE(S): at 17:52

## 2024-01-01 RX ADMIN — Medication 50 MICROGRAM(S): at 05:11

## 2024-01-01 RX ADMIN — CHOLESTYRAMINE 4 GRAM(S): 4 POWDER, FOR SUSPENSION ORAL at 18:45

## 2024-01-01 RX ADMIN — SODIUM CHLORIDE 1000 MILLILITER(S): 9 INJECTION INTRAMUSCULAR; INTRAVENOUS; SUBCUTANEOUS at 14:30

## 2024-01-01 RX ADMIN — I.V. FAT EMULSION 20.83 GM/KG/DAY: 20 EMULSION INTRAVENOUS at 18:34

## 2024-01-01 RX ADMIN — Medication 7.5 MILLIGRAM(S): at 02:50

## 2024-01-01 RX ADMIN — Medication 150 MILLIGRAM(S): at 11:17

## 2024-01-01 RX ADMIN — PANTOPRAZOLE SODIUM 40 MILLIGRAM(S): 20 TABLET, DELAYED RELEASE ORAL at 12:55

## 2024-01-01 RX ADMIN — Medication 40 MICROGRAM(S): at 06:15

## 2024-01-01 RX ADMIN — Medication 0.5 MILLIGRAM(S): at 21:46

## 2024-01-01 RX ADMIN — Medication 50 MICROGRAM(S): at 05:38

## 2024-01-01 RX ADMIN — Medication 0.25 MILLIGRAM(S): at 02:46

## 2024-01-01 RX ADMIN — Medication 50 MILLILITER(S): at 23:36

## 2024-01-01 RX ADMIN — ENOXAPARIN SODIUM 90 MILLIGRAM(S): 100 INJECTION SUBCUTANEOUS at 22:25

## 2024-01-01 RX ADMIN — Medication 20 MILLIGRAM(S): at 07:17

## 2024-01-01 RX ADMIN — ONDANSETRON 4 MILLIGRAM(S): 8 TABLET, FILM COATED ORAL at 01:00

## 2024-01-01 RX ADMIN — Medication 7.5 MILLIGRAM(S): at 11:50

## 2024-01-01 RX ADMIN — Medication 10 MILLIGRAM(S): at 05:02

## 2024-01-01 RX ADMIN — Medication 100 MILLIGRAM(S): at 11:42

## 2024-01-01 RX ADMIN — CHOLESTYRAMINE 4 GRAM(S): 4 POWDER, FOR SUSPENSION ORAL at 13:49

## 2024-01-01 RX ADMIN — Medication 10 MILLIGRAM(S): at 17:01

## 2024-01-01 RX ADMIN — Medication 150 MILLIGRAM(S): at 13:14

## 2024-01-01 RX ADMIN — Medication 3 MILLILITER(S): at 12:07

## 2024-01-01 RX ADMIN — ENOXAPARIN SODIUM 40 MILLIGRAM(S): 100 INJECTION SUBCUTANEOUS at 10:01

## 2024-01-01 RX ADMIN — Medication 20 MILLIGRAM(S): at 10:23

## 2024-01-01 RX ADMIN — URSODIOL 300 MILLIGRAM(S): 250 TABLET, FILM COATED ORAL at 13:17

## 2024-01-01 RX ADMIN — GABAPENTIN 100 MILLIGRAM(S): 400 CAPSULE ORAL at 21:12

## 2024-01-01 RX ADMIN — Medication 10 MILLIGRAM(S): at 22:03

## 2024-01-01 RX ADMIN — URSODIOL 300 MILLIGRAM(S): 250 TABLET, FILM COATED ORAL at 06:06

## 2024-01-01 RX ADMIN — Medication 50 MICROGRAM(S): at 06:08

## 2024-01-01 RX ADMIN — URSODIOL 300 MILLIGRAM(S): 250 TABLET, FILM COATED ORAL at 06:53

## 2024-01-01 RX ADMIN — Medication 40 MILLIGRAM(S): at 05:53

## 2024-01-01 RX ADMIN — Medication 1 EACH: at 17:58

## 2024-01-01 RX ADMIN — PANTOPRAZOLE SODIUM 40 MILLIGRAM(S): 20 TABLET, DELAYED RELEASE ORAL at 06:48

## 2024-01-01 RX ADMIN — Medication 3 MILLILITER(S): at 05:49

## 2024-01-01 RX ADMIN — SODIUM CHLORIDE 90 MILLILITER(S): 9 INJECTION INTRAMUSCULAR; INTRAVENOUS; SUBCUTANEOUS at 16:30

## 2024-01-01 RX ADMIN — Medication 100 MILLIGRAM(S): at 21:55

## 2024-01-01 RX ADMIN — URSODIOL 300 MILLIGRAM(S): 250 TABLET, FILM COATED ORAL at 21:56

## 2024-01-01 RX ADMIN — Medication 1 CAPSULE(S): at 07:27

## 2024-01-01 RX ADMIN — Medication 20 MILLIGRAM(S): at 22:19

## 2024-01-01 RX ADMIN — Medication 1 EACH: at 17:36

## 2024-01-01 RX ADMIN — ENOXAPARIN SODIUM 40 MILLIGRAM(S): 100 INJECTION SUBCUTANEOUS at 12:38

## 2024-01-01 RX ADMIN — Medication 300 MILLIGRAM(S): at 11:22

## 2024-01-01 RX ADMIN — CHOLESTYRAMINE 4 GRAM(S): 4 POWDER, FOR SUSPENSION ORAL at 14:15

## 2024-01-01 RX ADMIN — Medication 300 MILLIGRAM(S): at 23:44

## 2024-01-01 RX ADMIN — CHLORHEXIDINE GLUCONATE 1 APPLICATION(S): 213 SOLUTION TOPICAL at 06:07

## 2024-01-01 RX ADMIN — Medication 60 MILLIGRAM(S): at 10:46

## 2024-01-01 RX ADMIN — URSODIOL 300 MILLIGRAM(S): 250 TABLET, FILM COATED ORAL at 13:22

## 2024-01-01 RX ADMIN — Medication 5 MILLIGRAM(S): at 02:49

## 2024-01-01 RX ADMIN — Medication 10 MILLIGRAM(S): at 18:22

## 2024-01-01 RX ADMIN — URSODIOL 300 MILLIGRAM(S): 250 TABLET, FILM COATED ORAL at 12:16

## 2024-01-01 RX ADMIN — Medication 100 GRAM(S): at 06:00

## 2024-01-01 RX ADMIN — CHLORHEXIDINE GLUCONATE 1 APPLICATION(S): 213 SOLUTION TOPICAL at 05:27

## 2024-01-01 RX ADMIN — URSODIOL 300 MILLIGRAM(S): 250 TABLET, FILM COATED ORAL at 13:59

## 2024-01-01 RX ADMIN — Medication 40 MILLIGRAM(S): at 12:16

## 2024-01-01 RX ADMIN — Medication 50 MICROGRAM(S): at 05:53

## 2024-01-01 RX ADMIN — URSODIOL 300 MILLIGRAM(S): 250 TABLET, FILM COATED ORAL at 05:05

## 2024-01-01 RX ADMIN — I.V. FAT EMULSION 20.83 GM/KG/DAY: 20 EMULSION INTRAVENOUS at 17:59

## 2024-01-01 RX ADMIN — Medication 5 MILLIGRAM(S): at 21:32

## 2024-01-01 RX ADMIN — Medication 300 MILLIGRAM(S): at 11:58

## 2024-01-01 RX ADMIN — Medication 0.5 MILLIGRAM(S): at 23:08

## 2024-01-01 RX ADMIN — Medication 1 CAPSULE(S): at 08:47

## 2024-01-01 RX ADMIN — CARVEDILOL PHOSPHATE 6.25 MILLIGRAM(S): 80 CAPSULE, EXTENDED RELEASE ORAL at 10:44

## 2024-01-01 RX ADMIN — Medication 10 MILLIGRAM(S): at 09:00

## 2024-01-01 RX ADMIN — URSODIOL 300 MILLIGRAM(S): 250 TABLET, FILM COATED ORAL at 15:24

## 2024-01-01 RX ADMIN — Medication 1 CAPSULE(S): at 12:14

## 2024-01-01 RX ADMIN — URSODIOL 300 MILLIGRAM(S): 250 TABLET, FILM COATED ORAL at 19:58

## 2024-01-01 RX ADMIN — Medication 1 MILLIGRAM(S): at 22:37

## 2024-01-01 RX ADMIN — Medication 100 MILLIEQUIVALENT(S): at 08:28

## 2024-01-01 RX ADMIN — CEFEPIME 100 MILLIGRAM(S): 1 INJECTION, POWDER, FOR SOLUTION INTRAMUSCULAR; INTRAVENOUS at 23:35

## 2024-01-01 RX ADMIN — HEPARIN SODIUM 5000 UNIT(S): 5000 INJECTION INTRAVENOUS; SUBCUTANEOUS at 06:43

## 2024-01-01 RX ADMIN — ACETAZOLAMIDE 105 MILLIGRAM(S): 250 TABLET ORAL at 12:50

## 2024-01-01 RX ADMIN — Medication 10 MILLIGRAM(S): at 09:17

## 2024-01-01 RX ADMIN — Medication 60 MILLIGRAM(S): at 17:39

## 2024-01-01 RX ADMIN — Medication 1: at 11:05

## 2024-01-01 RX ADMIN — Medication 10 MILLIGRAM(S): at 21:29

## 2024-01-01 RX ADMIN — URSODIOL 300 MILLIGRAM(S): 250 TABLET, FILM COATED ORAL at 21:57

## 2024-01-01 RX ADMIN — Medication 10 MILLIGRAM(S): at 00:30

## 2024-01-01 RX ADMIN — URSODIOL 300 MILLIGRAM(S): 250 TABLET, FILM COATED ORAL at 07:43

## 2024-01-01 RX ADMIN — HYDROMORPHONE HYDROCHLORIDE 0.5 MILLIGRAM(S): 2 INJECTION INTRAMUSCULAR; INTRAVENOUS; SUBCUTANEOUS at 17:00

## 2024-01-01 RX ADMIN — Medication 5 MILLIGRAM(S): at 21:43

## 2024-01-01 RX ADMIN — Medication 7.5 MILLIGRAM(S): at 14:48

## 2024-01-01 RX ADMIN — Medication 1 EACH: at 17:01

## 2024-01-01 RX ADMIN — SODIUM CHLORIDE 2000 MILLILITER(S): 9 INJECTION, SOLUTION INTRAVENOUS at 09:00

## 2024-01-01 RX ADMIN — URSODIOL 300 MILLIGRAM(S): 250 TABLET, FILM COATED ORAL at 14:58

## 2024-01-01 RX ADMIN — Medication 40 MICROGRAM(S): at 07:47

## 2024-01-01 RX ADMIN — URSODIOL 300 MILLIGRAM(S): 250 TABLET, FILM COATED ORAL at 06:20

## 2024-01-01 RX ADMIN — Medication 10 MILLIGRAM(S): at 21:37

## 2024-01-01 RX ADMIN — ENOXAPARIN SODIUM 40 MILLIGRAM(S): 100 INJECTION SUBCUTANEOUS at 10:49

## 2024-01-01 RX ADMIN — Medication 1 EACH: at 17:30

## 2024-01-01 RX ADMIN — CHLORHEXIDINE GLUCONATE 1 APPLICATION(S): 213 SOLUTION TOPICAL at 05:19

## 2024-01-01 RX ADMIN — Medication 30 MICROGRAM(S): at 22:44

## 2024-01-01 RX ADMIN — MIDODRINE HYDROCHLORIDE 5 MILLIGRAM(S): 2.5 TABLET ORAL at 21:45

## 2024-01-01 RX ADMIN — Medication 50 MICROGRAM(S): at 05:54

## 2024-01-01 RX ADMIN — URSODIOL 300 MILLIGRAM(S): 250 TABLET, FILM COATED ORAL at 21:03

## 2024-01-01 RX ADMIN — Medication 10 MILLIGRAM(S): at 10:20

## 2024-01-01 RX ADMIN — Medication 30 MICROGRAM(S): at 21:49

## 2024-01-01 RX ADMIN — Medication 10 MILLIGRAM(S): at 17:33

## 2024-01-01 RX ADMIN — Medication 1 EACH: at 17:24

## 2024-01-01 RX ADMIN — URSODIOL 300 MILLIGRAM(S): 250 TABLET, FILM COATED ORAL at 13:39

## 2024-01-01 RX ADMIN — Medication 3 MILLILITER(S): at 00:17

## 2024-01-01 RX ADMIN — CEFEPIME 100 MILLIGRAM(S): 1 INJECTION, POWDER, FOR SOLUTION INTRAMUSCULAR; INTRAVENOUS at 10:09

## 2024-01-01 RX ADMIN — Medication 102 MILLIGRAM(S): at 09:21

## 2024-01-01 RX ADMIN — Medication 10 MILLIGRAM(S): at 11:03

## 2024-01-01 RX ADMIN — Medication 30 MICROGRAM(S): at 22:47

## 2024-01-01 RX ADMIN — Medication 1 CAPSULE(S): at 11:48

## 2024-01-01 RX ADMIN — Medication 3 MILLILITER(S): at 15:45

## 2024-01-01 RX ADMIN — Medication 10 MILLIGRAM(S): at 23:49

## 2024-01-01 RX ADMIN — Medication 5 MILLIGRAM(S): at 19:27

## 2024-01-01 RX ADMIN — SODIUM CHLORIDE 500 MILLILITER(S): 9 INJECTION, SOLUTION INTRAVENOUS at 02:56

## 2024-01-01 RX ADMIN — Medication 20 MILLIGRAM(S): at 17:32

## 2024-01-01 RX ADMIN — Medication 150 MILLIGRAM(S): at 11:24

## 2024-01-01 RX ADMIN — Medication 1 CAPSULE(S): at 11:23

## 2024-01-01 RX ADMIN — GABAPENTIN 100 MILLIGRAM(S): 400 CAPSULE ORAL at 22:19

## 2024-01-01 RX ADMIN — Medication 5 MILLIGRAM(S): at 21:49

## 2024-01-01 RX ADMIN — Medication 5 MILLIGRAM(S): at 02:42

## 2024-01-01 RX ADMIN — Medication 1 MILLIGRAM(S): at 22:50

## 2024-01-01 RX ADMIN — BUMETANIDE 1 MILLIGRAM(S): 0.25 INJECTION INTRAMUSCULAR; INTRAVENOUS at 09:52

## 2024-01-01 RX ADMIN — I.V. FAT EMULSION 20.83 GM/KG/DAY: 20 EMULSION INTRAVENOUS at 17:22

## 2024-01-01 RX ADMIN — Medication 5 MILLIGRAM(S): at 12:41

## 2024-01-01 RX ADMIN — URSODIOL 300 MILLIGRAM(S): 250 TABLET, FILM COATED ORAL at 21:00

## 2024-01-01 RX ADMIN — Medication 30 MICROGRAM(S): at 22:09

## 2024-01-01 RX ADMIN — Medication 300 MILLIGRAM(S): at 07:41

## 2024-01-01 RX ADMIN — Medication 10 MILLIGRAM(S): at 11:06

## 2024-01-01 RX ADMIN — ATORVASTATIN CALCIUM 20 MILLIGRAM(S): 80 TABLET, FILM COATED ORAL at 22:21

## 2024-01-01 RX ADMIN — Medication 1 MILLIGRAM(S): at 23:01

## 2024-01-01 RX ADMIN — Medication 40 MILLIGRAM(S): at 05:24

## 2024-01-01 RX ADMIN — Medication 5 MILLIGRAM(S): at 22:04

## 2024-01-01 RX ADMIN — Medication 0.25 MILLIGRAM(S): at 01:24

## 2024-01-01 RX ADMIN — GABAPENTIN 100 MILLIGRAM(S): 400 CAPSULE ORAL at 22:32

## 2024-01-01 RX ADMIN — Medication 100 MILLIGRAM(S): at 02:05

## 2024-01-01 RX ADMIN — Medication 60 MILLIGRAM(S): at 07:24

## 2024-01-01 RX ADMIN — Medication 1 EACH: at 19:19

## 2024-01-01 RX ADMIN — URSODIOL 300 MILLIGRAM(S): 250 TABLET, FILM COATED ORAL at 06:07

## 2024-01-01 RX ADMIN — URSODIOL 300 MILLIGRAM(S): 250 TABLET, FILM COATED ORAL at 06:18

## 2024-01-01 RX ADMIN — URSODIOL 300 MILLIGRAM(S): 250 TABLET, FILM COATED ORAL at 07:30

## 2024-01-01 RX ADMIN — Medication 5 MILLIGRAM(S): at 02:48

## 2024-01-01 RX ADMIN — Medication 2: at 06:50

## 2024-01-01 RX ADMIN — Medication 37.5 MICROGRAM(S): at 06:57

## 2024-01-01 RX ADMIN — Medication 1 APPLICATION(S): at 11:17

## 2024-01-01 RX ADMIN — HYDROMORPHONE HYDROCHLORIDE 0.25 MILLIGRAM(S): 2 INJECTION INTRAMUSCULAR; INTRAVENOUS; SUBCUTANEOUS at 12:12

## 2024-01-01 RX ADMIN — Medication 10 MILLIGRAM(S): at 21:53

## 2024-01-01 RX ADMIN — Medication 10 MILLIGRAM(S): at 18:23

## 2024-01-01 RX ADMIN — SODIUM CHLORIDE 250 MILLILITER(S): 9 INJECTION, SOLUTION INTRAVENOUS at 06:19

## 2024-01-01 RX ADMIN — Medication 5 MILLIGRAM(S): at 05:46

## 2024-01-01 RX ADMIN — Medication 1 APPLICATION(S): at 17:41

## 2024-01-01 RX ADMIN — Medication 60 MILLIGRAM(S): at 10:29

## 2024-01-01 RX ADMIN — Medication 20 MILLIEQUIVALENT(S): at 12:00

## 2024-01-01 RX ADMIN — Medication 1 DROP(S): at 17:21

## 2024-01-01 RX ADMIN — CEFEPIME 2000 MILLIGRAM(S): 1 INJECTION, POWDER, FOR SOLUTION INTRAMUSCULAR; INTRAVENOUS at 19:33

## 2024-01-01 RX ADMIN — Medication 5 MILLIGRAM(S): at 06:52

## 2024-01-01 RX ADMIN — Medication 3 MILLILITER(S): at 21:43

## 2024-01-01 RX ADMIN — Medication 1 MILLIGRAM(S): at 21:12

## 2024-01-01 RX ADMIN — Medication 150 MILLIGRAM(S): at 11:16

## 2024-01-01 RX ADMIN — SODIUM CHLORIDE 1000 MILLILITER(S): 9 INJECTION INTRAMUSCULAR; INTRAVENOUS; SUBCUTANEOUS at 13:38

## 2024-01-01 RX ADMIN — Medication 300 MILLIGRAM(S): at 05:53

## 2024-01-01 RX ADMIN — URSODIOL 300 MILLIGRAM(S): 250 TABLET, FILM COATED ORAL at 07:46

## 2024-01-01 RX ADMIN — Medication 100 MILLIEQUIVALENT(S): at 09:03

## 2024-01-01 RX ADMIN — I.V. FAT EMULSION 20.83 GM/KG/DAY: 20 EMULSION INTRAVENOUS at 17:14

## 2024-01-01 RX ADMIN — Medication 30 MICROGRAM(S): at 22:33

## 2024-01-01 RX ADMIN — I.V. FAT EMULSION 20.83 GM/KG/DAY: 20 EMULSION INTRAVENOUS at 18:51

## 2024-01-01 RX ADMIN — Medication 10 MILLIGRAM(S): at 16:28

## 2024-01-01 RX ADMIN — Medication 10 MILLIGRAM(S): at 11:54

## 2024-01-01 RX ADMIN — Medication 3 MILLILITER(S): at 15:16

## 2024-01-01 RX ADMIN — URSODIOL 300 MILLIGRAM(S): 250 TABLET, FILM COATED ORAL at 21:15

## 2024-01-01 RX ADMIN — Medication 0.5 MILLIGRAM(S): at 06:39

## 2024-01-01 RX ADMIN — Medication 10 MILLIGRAM(S): at 14:11

## 2024-01-01 RX ADMIN — Medication 1 DROP(S): at 18:16

## 2024-01-01 RX ADMIN — Medication 3 MILLILITER(S): at 05:25

## 2024-01-01 RX ADMIN — Medication 10 MILLIGRAM(S): at 06:07

## 2024-01-01 RX ADMIN — Medication 100 MILLIEQUIVALENT(S): at 08:34

## 2024-01-01 RX ADMIN — GABAPENTIN 100 MILLIGRAM(S): 400 CAPSULE ORAL at 22:20

## 2024-01-01 RX ADMIN — Medication 50 MILLIGRAM(S): at 11:03

## 2024-01-01 RX ADMIN — CHLORHEXIDINE GLUCONATE 1 APPLICATION(S): 213 SOLUTION TOPICAL at 07:21

## 2024-01-01 RX ADMIN — ATORVASTATIN CALCIUM 20 MILLIGRAM(S): 80 TABLET, FILM COATED ORAL at 22:59

## 2024-01-01 RX ADMIN — ONDANSETRON 4 MILLIGRAM(S): 8 TABLET, FILM COATED ORAL at 07:13

## 2024-01-01 RX ADMIN — CARVEDILOL PHOSPHATE 6.25 MILLIGRAM(S): 80 CAPSULE, EXTENDED RELEASE ORAL at 13:06

## 2024-01-01 RX ADMIN — IOHEXOL 30 MILLILITER(S): 300 INJECTION, SOLUTION INTRAVENOUS at 18:40

## 2024-01-01 RX ADMIN — Medication 3 MILLILITER(S): at 07:29

## 2024-01-01 RX ADMIN — Medication 0.25 MILLIGRAM(S): at 03:19

## 2024-01-01 RX ADMIN — Medication 3 MILLILITER(S): at 16:22

## 2024-01-01 RX ADMIN — Medication 50 MICROGRAM(S): at 07:18

## 2024-01-01 RX ADMIN — Medication 10 MILLIGRAM(S): at 16:31

## 2024-01-01 RX ADMIN — Medication 30 MICROGRAM(S): at 22:07

## 2024-01-01 RX ADMIN — Medication 10 MILLIGRAM(S): at 12:12

## 2024-01-01 RX ADMIN — CHLORHEXIDINE GLUCONATE 1 APPLICATION(S): 213 SOLUTION TOPICAL at 05:53

## 2024-01-01 RX ADMIN — I.V. FAT EMULSION 20.83 GM/KG/DAY: 20 EMULSION INTRAVENOUS at 19:18

## 2024-01-01 RX ADMIN — Medication 100 MILLIGRAM(S): at 13:00

## 2024-01-01 RX ADMIN — DESMOPRESSIN ACETATE 0.2 MILLIGRAM(S): 0.1 TABLET ORAL at 18:45

## 2024-01-01 RX ADMIN — Medication 7.5 MILLIGRAM(S): at 21:51

## 2024-01-01 RX ADMIN — Medication 50 MICROGRAM(S): at 05:31

## 2024-01-01 RX ADMIN — Medication 100 MILLIEQUIVALENT(S): at 20:52

## 2024-01-01 RX ADMIN — ONDANSETRON 4 MILLIGRAM(S): 8 TABLET, FILM COATED ORAL at 03:24

## 2024-01-01 RX ADMIN — Medication 50 MILLIEQUIVALENT(S): at 09:16

## 2024-01-01 RX ADMIN — Medication 300 MILLIGRAM(S): at 11:40

## 2024-01-01 RX ADMIN — Medication 10 MILLIGRAM(S): at 22:48

## 2024-01-01 RX ADMIN — Medication 1 CAPSULE(S): at 07:48

## 2024-01-01 RX ADMIN — CHOLESTYRAMINE 4 GRAM(S): 4 POWDER, FOR SUSPENSION ORAL at 10:27

## 2024-01-01 RX ADMIN — Medication 306 MILLIGRAM(S): at 13:06

## 2024-01-01 RX ADMIN — Medication 2: at 00:12

## 2024-01-01 RX ADMIN — Medication 100 MILLIGRAM(S): at 21:49

## 2024-01-01 RX ADMIN — URSODIOL 300 MILLIGRAM(S): 250 TABLET, FILM COATED ORAL at 12:09

## 2024-01-01 RX ADMIN — Medication 225 MILLIGRAM(S): at 11:05

## 2024-01-01 RX ADMIN — Medication 50 MILLILITER(S): at 13:10

## 2024-01-01 RX ADMIN — URSODIOL 300 MILLIGRAM(S): 250 TABLET, FILM COATED ORAL at 13:33

## 2024-01-01 RX ADMIN — GABAPENTIN 100 MILLIGRAM(S): 400 CAPSULE ORAL at 22:18

## 2024-01-01 RX ADMIN — Medication 10 MILLIGRAM(S): at 16:18

## 2024-01-01 RX ADMIN — Medication 30 MICROGRAM(S): at 22:49

## 2024-01-01 RX ADMIN — Medication 10 MILLIGRAM(S): at 15:57

## 2024-01-01 RX ADMIN — Medication 5 MILLIGRAM(S): at 22:23

## 2024-01-01 RX ADMIN — Medication 5 MILLIGRAM(S): at 03:12

## 2024-01-01 RX ADMIN — GABAPENTIN 100 MILLIGRAM(S): 400 CAPSULE ORAL at 22:03

## 2024-01-01 RX ADMIN — Medication 10 MILLIGRAM(S): at 18:06

## 2024-01-01 RX ADMIN — Medication 50 MILLIGRAM(S): at 06:00

## 2024-01-01 RX ADMIN — Medication 1 CAPSULE(S): at 10:05

## 2024-01-01 RX ADMIN — GABAPENTIN 100 MILLIGRAM(S): 400 CAPSULE ORAL at 21:56

## 2024-01-01 RX ADMIN — URSODIOL 300 MILLIGRAM(S): 250 TABLET, FILM COATED ORAL at 06:51

## 2024-01-01 RX ADMIN — Medication 300 MILLIGRAM(S): at 18:38

## 2024-01-01 RX ADMIN — Medication 3 MILLILITER(S): at 22:58

## 2024-01-01 RX ADMIN — Medication 1 DROP(S): at 11:45

## 2024-01-01 RX ADMIN — Medication 100 MILLIGRAM(S): at 23:30

## 2024-01-01 RX ADMIN — ATORVASTATIN CALCIUM 20 MILLIGRAM(S): 80 TABLET, FILM COATED ORAL at 22:23

## 2024-01-01 RX ADMIN — Medication 150 MILLIGRAM(S): at 13:13

## 2024-01-01 RX ADMIN — Medication 150 MILLIGRAM(S): at 06:25

## 2024-01-01 RX ADMIN — Medication 1 EACH: at 17:28

## 2024-01-01 RX ADMIN — URSODIOL 300 MILLIGRAM(S): 250 TABLET, FILM COATED ORAL at 05:24

## 2024-01-01 RX ADMIN — Medication 100 MILLIEQUIVALENT(S): at 00:56

## 2024-01-01 RX ADMIN — Medication 1 CAPSULE(S): at 07:35

## 2024-01-01 RX ADMIN — Medication 60 MILLIGRAM(S): at 08:14

## 2024-01-01 RX ADMIN — Medication 2: at 05:42

## 2024-01-01 RX ADMIN — Medication 1 MILLIGRAM(S): at 22:47

## 2024-01-01 RX ADMIN — Medication 1 EACH: at 19:16

## 2024-01-01 RX ADMIN — TEDUGLUTIDE 4.7 MILLIGRAM(S): KIT at 12:50

## 2024-01-01 RX ADMIN — Medication 30 MICROGRAM(S): at 22:40

## 2024-01-01 RX ADMIN — Medication 1 EACH: at 18:51

## 2024-01-01 RX ADMIN — TEDUGLUTIDE 4.7 MILLIGRAM(S): KIT at 11:22

## 2024-01-01 RX ADMIN — Medication 100 MILLIGRAM(S): at 22:30

## 2024-01-01 RX ADMIN — Medication 3 MILLILITER(S): at 20:09

## 2024-01-01 RX ADMIN — Medication 1 CAPSULE(S): at 12:25

## 2024-01-01 RX ADMIN — Medication 1 DROP(S): at 14:10

## 2024-01-01 RX ADMIN — Medication 1 DROP(S): at 11:21

## 2024-01-01 RX ADMIN — URSODIOL 300 MILLIGRAM(S): 250 TABLET, FILM COATED ORAL at 21:12

## 2024-01-01 RX ADMIN — CHOLESTYRAMINE 4 GRAM(S): 4 POWDER, FOR SUSPENSION ORAL at 07:12

## 2024-01-01 RX ADMIN — MIDODRINE HYDROCHLORIDE 5 MILLIGRAM(S): 2.5 TABLET ORAL at 14:11

## 2024-01-01 RX ADMIN — Medication 10 MILLIGRAM(S): at 04:12

## 2024-01-01 RX ADMIN — EPINEPHRINE 1 MILLIGRAM(S): 0.3 INJECTION INTRAMUSCULAR; SUBCUTANEOUS at 20:07

## 2024-01-01 RX ADMIN — Medication 50 MILLILITER(S): at 22:00

## 2024-01-01 RX ADMIN — URSODIOL 300 MILLIGRAM(S): 250 TABLET, FILM COATED ORAL at 17:34

## 2024-01-01 RX ADMIN — URSODIOL 300 MILLIGRAM(S): 250 TABLET, FILM COATED ORAL at 06:44

## 2024-01-01 RX ADMIN — ONDANSETRON 4 MILLIGRAM(S): 8 TABLET, FILM COATED ORAL at 05:12

## 2024-01-01 RX ADMIN — Medication 1 CAPSULE(S): at 11:42

## 2024-01-01 RX ADMIN — Medication 1 APPLICATION(S): at 17:44

## 2024-01-01 RX ADMIN — I.V. FAT EMULSION 20.83 GM/KG/DAY: 20 EMULSION INTRAVENOUS at 17:28

## 2024-01-01 RX ADMIN — ENOXAPARIN SODIUM 90 MILLIGRAM(S): 100 INJECTION SUBCUTANEOUS at 11:25

## 2024-01-01 RX ADMIN — Medication 50 MICROGRAM(S): at 07:03

## 2024-01-01 RX ADMIN — Medication 1 MILLIGRAM(S): at 00:21

## 2024-01-01 RX ADMIN — Medication 225 MILLIGRAM(S): at 13:40

## 2024-01-01 RX ADMIN — I.V. FAT EMULSION 20.83 GM/KG/DAY: 20 EMULSION INTRAVENOUS at 17:48

## 2024-01-01 RX ADMIN — Medication 2: at 13:27

## 2024-01-01 RX ADMIN — HEPARIN SODIUM 5000 UNIT(S): 5000 INJECTION INTRAVENOUS; SUBCUTANEOUS at 18:12

## 2024-01-01 RX ADMIN — Medication 3 MILLILITER(S): at 22:06

## 2024-01-01 RX ADMIN — SODIUM CHLORIDE 500 MILLILITER(S): 9 INJECTION, SOLUTION INTRAVENOUS at 16:45

## 2024-01-01 RX ADMIN — Medication 5 MILLIGRAM(S): at 21:26

## 2024-01-01 RX ADMIN — Medication 3 MILLILITER(S): at 12:55

## 2024-01-01 RX ADMIN — ONDANSETRON 4 MILLIGRAM(S): 8 TABLET, FILM COATED ORAL at 13:22

## 2024-01-01 RX ADMIN — Medication 20 MILLIGRAM(S): at 17:40

## 2024-01-01 RX ADMIN — Medication 5 MILLIGRAM(S): at 11:42

## 2024-01-01 RX ADMIN — URSODIOL 300 MILLIGRAM(S): 250 TABLET, FILM COATED ORAL at 21:24

## 2024-01-01 RX ADMIN — Medication 0.5 MILLIGRAM(S): at 23:07

## 2024-01-01 RX ADMIN — Medication 1: at 17:17

## 2024-01-01 RX ADMIN — Medication 7.5 MILLIGRAM(S): at 09:25

## 2024-01-01 RX ADMIN — Medication 3 MILLILITER(S): at 10:29

## 2024-01-01 RX ADMIN — Medication 6 MILLIGRAM(S): at 03:11

## 2024-01-01 RX ADMIN — Medication 10 MILLIGRAM(S): at 04:40

## 2024-01-01 RX ADMIN — Medication 100 MILLIGRAM(S): at 22:43

## 2024-01-01 RX ADMIN — Medication 40 MILLIGRAM(S): at 22:18

## 2024-01-01 RX ADMIN — Medication 50 MILLILITER(S): at 05:17

## 2024-01-01 RX ADMIN — URSODIOL 300 MILLIGRAM(S): 250 TABLET, FILM COATED ORAL at 06:26

## 2024-01-01 RX ADMIN — URSODIOL 300 MILLIGRAM(S): 250 TABLET, FILM COATED ORAL at 14:27

## 2024-01-01 RX ADMIN — TEDUGLUTIDE 4.7 MILLIGRAM(S): KIT at 13:19

## 2024-01-01 RX ADMIN — Medication 1 DROP(S): at 13:23

## 2024-01-01 RX ADMIN — Medication 1 MILLIGRAM(S): at 21:16

## 2024-01-01 RX ADMIN — ATORVASTATIN CALCIUM 20 MILLIGRAM(S): 80 TABLET, FILM COATED ORAL at 21:15

## 2024-01-01 RX ADMIN — ERYTHROPOIETIN 10000 UNIT(S): 10000 INJECTION, SOLUTION INTRAVENOUS; SUBCUTANEOUS at 12:32

## 2024-01-01 RX ADMIN — Medication 0.5 MILLIGRAM(S): at 21:16

## 2024-01-01 RX ADMIN — Medication 10 MILLIGRAM(S): at 11:24

## 2024-01-01 RX ADMIN — Medication 50 MILLILITER(S): at 06:20

## 2024-01-01 RX ADMIN — Medication 100 MILLIGRAM(S): at 10:00

## 2024-01-01 RX ADMIN — URSODIOL 300 MILLIGRAM(S): 250 TABLET, FILM COATED ORAL at 06:55

## 2024-01-01 RX ADMIN — Medication 3 MILLILITER(S): at 23:21

## 2024-01-01 RX ADMIN — Medication 1 EACH: at 17:04

## 2024-01-01 RX ADMIN — I.V. FAT EMULSION 20.83 GM/KG/DAY: 20 EMULSION INTRAVENOUS at 17:15

## 2024-01-01 RX ADMIN — ENOXAPARIN SODIUM 90 MILLIGRAM(S): 100 INJECTION SUBCUTANEOUS at 22:30

## 2024-01-01 RX ADMIN — I.V. FAT EMULSION 20.83 GM/KG/DAY: 20 EMULSION INTRAVENOUS at 18:23

## 2024-01-01 RX ADMIN — GABAPENTIN 100 MILLIGRAM(S): 400 CAPSULE ORAL at 21:14

## 2024-01-01 RX ADMIN — Medication 30 MICROGRAM(S): at 21:54

## 2024-01-01 RX ADMIN — URSODIOL 300 MILLIGRAM(S): 250 TABLET, FILM COATED ORAL at 21:37

## 2024-01-01 RX ADMIN — CEFEPIME 12.5 MILLIGRAM(S): 1 INJECTION, POWDER, FOR SOLUTION INTRAMUSCULAR; INTRAVENOUS at 12:12

## 2024-01-01 RX ADMIN — Medication 10 MILLIGRAM(S): at 05:12

## 2024-01-01 RX ADMIN — I.V. FAT EMULSION 20.83 GM/KG/DAY: 20 EMULSION INTRAVENOUS at 17:01

## 2024-01-01 RX ADMIN — Medication 40 MICROGRAM(S): at 06:53

## 2024-01-01 RX ADMIN — Medication 1 MILLIGRAM(S): at 00:24

## 2024-01-01 RX ADMIN — Medication 60 MILLIGRAM(S): at 13:21

## 2024-01-01 RX ADMIN — Medication 300 MILLIGRAM(S): at 06:19

## 2024-01-01 RX ADMIN — Medication 6: at 06:03

## 2024-01-01 RX ADMIN — Medication 40 MICROGRAM(S): at 05:52

## 2024-01-01 RX ADMIN — Medication 1 CAPSULE(S): at 07:37

## 2024-01-01 RX ADMIN — Medication 50 MILLIGRAM(S): at 11:10

## 2024-01-01 RX ADMIN — Medication 30 MICROGRAM(S): at 21:33

## 2024-01-01 RX ADMIN — URSODIOL 300 MILLIGRAM(S): 250 TABLET, FILM COATED ORAL at 22:52

## 2024-01-01 RX ADMIN — EPINEPHRINE 1 MILLIGRAM(S): 0.3 INJECTION INTRAMUSCULAR; SUBCUTANEOUS at 16:32

## 2024-01-01 RX ADMIN — HEPARIN SODIUM 5000 UNIT(S): 5000 INJECTION INTRAVENOUS; SUBCUTANEOUS at 05:38

## 2024-01-01 RX ADMIN — Medication 1 DROP(S): at 18:00

## 2024-01-01 RX ADMIN — Medication 100 MILLIGRAM(S): at 01:02

## 2024-01-01 RX ADMIN — Medication 0.5 MILLIGRAM(S): at 22:50

## 2024-01-01 RX ADMIN — ONDANSETRON 4 MILLIGRAM(S): 8 TABLET, FILM COATED ORAL at 05:13

## 2024-01-01 RX ADMIN — URSODIOL 300 MILLIGRAM(S): 250 TABLET, FILM COATED ORAL at 14:11

## 2024-01-01 RX ADMIN — CEFTRIAXONE 100 MILLIGRAM(S): 500 INJECTION, POWDER, FOR SOLUTION INTRAMUSCULAR; INTRAVENOUS at 19:07

## 2024-01-01 RX ADMIN — Medication 40 MILLIEQUIVALENT(S): at 05:29

## 2024-01-01 RX ADMIN — Medication 1 CAPSULE(S): at 07:30

## 2024-01-01 RX ADMIN — Medication 1 EACH: at 21:33

## 2024-01-01 RX ADMIN — URSODIOL 300 MILLIGRAM(S): 250 TABLET, FILM COATED ORAL at 17:26

## 2024-01-01 RX ADMIN — Medication 10 MILLIGRAM(S): at 20:33

## 2024-01-01 RX ADMIN — Medication 1 DROP(S): at 18:20

## 2024-01-01 RX ADMIN — ONDANSETRON 4 MILLIGRAM(S): 8 TABLET, FILM COATED ORAL at 08:42

## 2024-01-01 RX ADMIN — URSODIOL 300 MILLIGRAM(S): 250 TABLET, FILM COATED ORAL at 06:27

## 2024-01-01 RX ADMIN — Medication 300 MILLIGRAM(S): at 12:37

## 2024-01-01 RX ADMIN — Medication 1 EACH: at 17:31

## 2024-01-01 RX ADMIN — Medication 1 APPLICATION(S): at 16:42

## 2024-01-01 RX ADMIN — IRON SUCROSE 176.67 MILLIGRAM(S): 20 INJECTION, SOLUTION INTRAVENOUS at 09:55

## 2024-01-01 RX ADMIN — Medication 100 MILLIGRAM(S): at 05:31

## 2024-01-01 RX ADMIN — Medication 50 MILLIGRAM(S): at 10:13

## 2024-01-01 RX ADMIN — Medication 5 MILLIGRAM(S): at 22:03

## 2024-01-01 RX ADMIN — Medication 10 MILLIGRAM(S): at 17:32

## 2024-01-01 RX ADMIN — Medication 150 MILLIGRAM(S): at 05:38

## 2024-01-01 RX ADMIN — ERYTHROPOIETIN 10000 UNIT(S): 10000 INJECTION, SOLUTION INTRAVENOUS; SUBCUTANEOUS at 13:22

## 2024-01-01 RX ADMIN — SIMETHICONE 80 MILLIGRAM(S): 80 TABLET, CHEWABLE ORAL at 11:41

## 2024-01-01 RX ADMIN — URSODIOL 300 MILLIGRAM(S): 250 TABLET, FILM COATED ORAL at 22:20

## 2024-01-01 RX ADMIN — Medication 1 TABLET(S): at 08:20

## 2024-01-01 RX ADMIN — I.V. FAT EMULSION 20.83 GM/KG/DAY: 20 EMULSION INTRAVENOUS at 20:00

## 2024-01-01 RX ADMIN — Medication 50 MILLIGRAM(S): at 22:18

## 2024-01-01 RX ADMIN — Medication 37.5 MICROGRAM(S): at 05:53

## 2024-01-01 RX ADMIN — Medication 50 MICROGRAM(S): at 06:01

## 2024-01-01 RX ADMIN — Medication 30 MICROGRAM(S): at 23:09

## 2024-01-01 RX ADMIN — CEFEPIME 100 MILLIGRAM(S): 1 INJECTION, POWDER, FOR SOLUTION INTRAMUSCULAR; INTRAVENOUS at 06:11

## 2024-01-01 RX ADMIN — Medication 3 MILLILITER(S): at 23:37

## 2024-01-01 RX ADMIN — Medication 7.5 MILLIGRAM(S): at 18:47

## 2024-01-01 RX ADMIN — URSODIOL 300 MILLIGRAM(S): 250 TABLET, FILM COATED ORAL at 21:51

## 2024-01-01 RX ADMIN — Medication 150 MILLIGRAM(S): at 12:50

## 2024-01-01 RX ADMIN — Medication 50 MILLILITER(S): at 14:49

## 2024-01-01 RX ADMIN — Medication 0.25 MILLIGRAM(S): at 21:43

## 2024-01-01 RX ADMIN — CHOLESTYRAMINE 4 GRAM(S): 4 POWDER, FOR SUSPENSION ORAL at 16:49

## 2024-01-01 RX ADMIN — Medication 5 MILLIGRAM(S): at 23:20

## 2024-01-01 RX ADMIN — Medication 1 CAPSULE(S): at 11:45

## 2024-01-01 RX ADMIN — CHLORHEXIDINE GLUCONATE 1 APPLICATION(S): 213 SOLUTION TOPICAL at 06:45

## 2024-01-01 RX ADMIN — Medication 0.5 MILLIGRAM(S): at 03:40

## 2024-01-01 RX ADMIN — Medication 300 MILLIGRAM(S): at 11:42

## 2024-01-01 RX ADMIN — CHOLESTYRAMINE 4 GRAM(S): 4 POWDER, FOR SUSPENSION ORAL at 16:55

## 2024-01-01 RX ADMIN — CHOLESTYRAMINE 4 GRAM(S): 4 POWDER, FOR SUSPENSION ORAL at 09:02

## 2024-01-01 RX ADMIN — Medication 10 MILLIGRAM(S): at 03:45

## 2024-01-01 RX ADMIN — TEDUGLUTIDE 4.7 MILLIGRAM(S): KIT at 12:31

## 2024-01-01 RX ADMIN — Medication 1 CAPSULE(S): at 08:13

## 2024-01-01 RX ADMIN — Medication 50 MILLIGRAM(S): at 10:45

## 2024-01-01 RX ADMIN — GABAPENTIN 100 MILLIGRAM(S): 400 CAPSULE ORAL at 21:11

## 2024-01-01 RX ADMIN — Medication 1 MILLIGRAM(S): at 01:10

## 2024-01-01 RX ADMIN — REMDESIVIR 200 MILLIGRAM(S): 5 INJECTION INTRAVENOUS at 02:47

## 2024-01-01 RX ADMIN — CHOLESTYRAMINE 4 GRAM(S): 4 POWDER, FOR SUSPENSION ORAL at 16:45

## 2024-01-01 RX ADMIN — Medication 100 MILLIGRAM(S): at 13:23

## 2024-01-01 RX ADMIN — Medication 10 MILLIGRAM(S): at 17:34

## 2024-01-01 RX ADMIN — Medication 10 MILLIGRAM(S): at 21:07

## 2024-01-01 RX ADMIN — ONDANSETRON 4 MILLIGRAM(S): 8 TABLET, FILM COATED ORAL at 02:22

## 2024-01-01 RX ADMIN — HEPARIN SODIUM 5000 UNIT(S): 5000 INJECTION INTRAVENOUS; SUBCUTANEOUS at 18:58

## 2024-01-01 RX ADMIN — Medication 50 MICROGRAM(S): at 07:44

## 2024-01-01 RX ADMIN — Medication 100 MILLIGRAM(S): at 11:32

## 2024-01-01 RX ADMIN — POTASSIUM PHOSPHATE, MONOBASIC POTASSIUM PHOSPHATE, DIBASIC 83.33 MILLIMOLE(S): 236; 224 INJECTION, SOLUTION INTRAVENOUS at 05:52

## 2024-01-01 RX ADMIN — ATORVASTATIN CALCIUM 20 MILLIGRAM(S): 80 TABLET, FILM COATED ORAL at 21:08

## 2024-01-01 RX ADMIN — Medication 1 CAPSULE(S): at 12:26

## 2024-01-01 RX ADMIN — Medication 400 MILLIGRAM(S): at 08:55

## 2024-01-01 RX ADMIN — OCTREOTIDE ACETATE 10 MICROGRAM(S)/HR: 200 INJECTION, SOLUTION INTRAVENOUS; SUBCUTANEOUS at 08:13

## 2024-01-01 RX ADMIN — TEDUGLUTIDE 4.7 MILLIGRAM(S): KIT at 12:17

## 2024-01-01 RX ADMIN — HEPARIN SODIUM 5000 UNIT(S): 5000 INJECTION INTRAVENOUS; SUBCUTANEOUS at 06:26

## 2024-01-01 RX ADMIN — Medication 5 MILLIGRAM(S): at 22:22

## 2024-01-01 RX ADMIN — Medication 5 MILLIGRAM(S): at 22:07

## 2024-01-01 RX ADMIN — I.V. FAT EMULSION 20.83 GM/KG/DAY: 20 EMULSION INTRAVENOUS at 17:38

## 2024-01-01 RX ADMIN — URSODIOL 300 MILLIGRAM(S): 250 TABLET, FILM COATED ORAL at 05:46

## 2024-01-01 RX ADMIN — ENOXAPARIN SODIUM 90 MILLIGRAM(S): 100 INJECTION SUBCUTANEOUS at 22:11

## 2024-01-01 RX ADMIN — URSODIOL 300 MILLIGRAM(S): 250 TABLET, FILM COATED ORAL at 04:43

## 2024-01-01 RX ADMIN — Medication 5 MILLIGRAM(S): at 22:53

## 2024-01-01 RX ADMIN — CEFEPIME 100 MILLIGRAM(S): 1 INJECTION, POWDER, FOR SOLUTION INTRAMUSCULAR; INTRAVENOUS at 22:44

## 2024-01-01 RX ADMIN — Medication 10 MILLIGRAM(S): at 18:36

## 2024-01-01 RX ADMIN — Medication 30 MICROGRAM(S): at 22:48

## 2024-01-01 RX ADMIN — GABAPENTIN 100 MILLIGRAM(S): 400 CAPSULE ORAL at 22:10

## 2024-01-01 RX ADMIN — Medication 10 MILLIGRAM(S): at 17:18

## 2024-01-01 RX ADMIN — Medication 1 CAPSULE(S): at 01:24

## 2024-01-01 RX ADMIN — DIATRIZOATE MEGLUMINE 30 MILLILITER(S): 180 INJECTION, SOLUTION INTRAVESICAL at 10:15

## 2024-01-01 RX ADMIN — URSODIOL 300 MILLIGRAM(S): 250 TABLET, FILM COATED ORAL at 20:00

## 2024-01-01 RX ADMIN — Medication 10 MILLIGRAM(S): at 01:22

## 2024-01-01 RX ADMIN — IRON SUCROSE 176.67 MILLIGRAM(S): 20 INJECTION, SOLUTION INTRAVENOUS at 09:20

## 2024-01-01 RX ADMIN — Medication 10 MILLIGRAM(S): at 16:54

## 2024-01-01 RX ADMIN — I.V. FAT EMULSION 20.83 GM/KG/DAY: 20 EMULSION INTRAVENOUS at 19:12

## 2024-01-01 RX ADMIN — CHLORHEXIDINE GLUCONATE 1 APPLICATION(S): 213 SOLUTION TOPICAL at 07:11

## 2024-01-01 RX ADMIN — ENOXAPARIN SODIUM 90 MILLIGRAM(S): 100 INJECTION SUBCUTANEOUS at 22:14

## 2024-01-01 RX ADMIN — Medication 3 MILLILITER(S): at 04:08

## 2024-01-01 RX ADMIN — Medication 10 MILLIGRAM(S): at 17:51

## 2024-01-01 RX ADMIN — Medication 10 MILLIGRAM(S): at 17:00

## 2024-01-01 RX ADMIN — URSODIOL 300 MILLIGRAM(S): 250 TABLET, FILM COATED ORAL at 14:33

## 2024-01-01 RX ADMIN — Medication 50 MICROGRAM(S): at 07:41

## 2024-01-01 RX ADMIN — Medication 300 MILLIGRAM(S): at 11:44

## 2024-01-01 RX ADMIN — Medication 100 MILLIGRAM(S): at 06:08

## 2024-01-01 RX ADMIN — Medication 10 MILLIGRAM(S): at 23:02

## 2024-01-01 RX ADMIN — CHLORHEXIDINE GLUCONATE 1 APPLICATION(S): 213 SOLUTION TOPICAL at 06:26

## 2024-01-01 RX ADMIN — HEPARIN SODIUM 5000 UNIT(S): 5000 INJECTION INTRAVENOUS; SUBCUTANEOUS at 14:11

## 2024-01-01 RX ADMIN — CHLORHEXIDINE GLUCONATE 1 APPLICATION(S): 213 SOLUTION TOPICAL at 05:51

## 2024-01-01 RX ADMIN — URSODIOL 300 MILLIGRAM(S): 250 TABLET, FILM COATED ORAL at 14:05

## 2024-01-01 RX ADMIN — Medication 0.25 MILLIGRAM(S): at 21:29

## 2024-01-01 RX ADMIN — Medication 20 MILLIGRAM(S): at 02:06

## 2024-01-01 RX ADMIN — HYDROMORPHONE HYDROCHLORIDE 0.25 MILLIGRAM(S): 2 INJECTION INTRAMUSCULAR; INTRAVENOUS; SUBCUTANEOUS at 11:36

## 2024-01-01 RX ADMIN — Medication 125 MILLILITER(S): at 16:32

## 2024-01-01 RX ADMIN — Medication 50 MILLILITER(S): at 15:36

## 2024-01-01 RX ADMIN — Medication 300 MILLIGRAM(S): at 06:06

## 2024-01-01 RX ADMIN — Medication 3 MILLILITER(S): at 07:25

## 2024-01-01 RX ADMIN — Medication 100 MILLIGRAM(S): at 05:14

## 2024-01-01 RX ADMIN — Medication 0.25 MILLIGRAM(S): at 01:16

## 2024-01-01 RX ADMIN — Medication 1 DROP(S): at 12:21

## 2024-01-01 RX ADMIN — CARVEDILOL PHOSPHATE 6.25 MILLIGRAM(S): 80 CAPSULE, EXTENDED RELEASE ORAL at 10:11

## 2024-01-01 RX ADMIN — URSODIOL 300 MILLIGRAM(S): 250 TABLET, FILM COATED ORAL at 06:03

## 2024-01-01 RX ADMIN — URSODIOL 300 MILLIGRAM(S): 250 TABLET, FILM COATED ORAL at 15:00

## 2024-01-01 RX ADMIN — Medication 100 MILLIGRAM(S): at 11:46

## 2024-01-01 RX ADMIN — Medication 0.5 MILLIGRAM(S): at 09:23

## 2024-01-01 RX ADMIN — CHLORHEXIDINE GLUCONATE 1 APPLICATION(S): 213 SOLUTION TOPICAL at 14:35

## 2024-01-01 RX ADMIN — ENOXAPARIN SODIUM 40 MILLIGRAM(S): 100 INJECTION SUBCUTANEOUS at 11:07

## 2024-01-01 RX ADMIN — Medication 100 MILLIGRAM(S): at 13:46

## 2024-01-01 RX ADMIN — GABAPENTIN 100 MILLIGRAM(S): 400 CAPSULE ORAL at 21:02

## 2024-01-01 RX ADMIN — Medication 50 MILLIGRAM(S): at 21:34

## 2024-01-01 RX ADMIN — Medication 1 EACH: at 17:10

## 2024-01-01 RX ADMIN — CHOLESTYRAMINE 4 GRAM(S): 4 POWDER, FOR SUSPENSION ORAL at 17:27

## 2024-01-01 RX ADMIN — Medication 40 MILLIGRAM(S): at 22:24

## 2024-01-01 RX ADMIN — Medication 10 MILLIGRAM(S): at 03:28

## 2024-01-01 RX ADMIN — URSODIOL 300 MILLIGRAM(S): 250 TABLET, FILM COATED ORAL at 12:45

## 2024-01-01 RX ADMIN — URSODIOL 300 MILLIGRAM(S): 250 TABLET, FILM COATED ORAL at 13:06

## 2024-01-01 RX ADMIN — CARVEDILOL PHOSPHATE 6.25 MILLIGRAM(S): 80 CAPSULE, EXTENDED RELEASE ORAL at 10:21

## 2024-01-01 RX ADMIN — Medication 5 MILLIGRAM(S): at 22:42

## 2024-01-01 RX ADMIN — URSODIOL 300 MILLIGRAM(S): 250 TABLET, FILM COATED ORAL at 21:11

## 2024-01-01 RX ADMIN — CHOLESTYRAMINE 4 GRAM(S): 4 POWDER, FOR SUSPENSION ORAL at 17:40

## 2024-01-01 RX ADMIN — CHOLESTYRAMINE 4 GRAM(S): 4 POWDER, FOR SUSPENSION ORAL at 17:56

## 2024-01-01 RX ADMIN — TEDUGLUTIDE 4.7 MILLIGRAM(S): KIT at 12:41

## 2024-01-01 RX ADMIN — ONDANSETRON 4 MILLIGRAM(S): 8 TABLET, FILM COATED ORAL at 06:31

## 2024-01-01 RX ADMIN — SODIUM CHLORIDE 1000 MILLILITER(S): 9 INJECTION, SOLUTION INTRAVENOUS at 00:17

## 2024-01-01 RX ADMIN — CHOLESTYRAMINE 4 GRAM(S): 4 POWDER, FOR SUSPENSION ORAL at 16:17

## 2024-01-01 RX ADMIN — CHOLESTYRAMINE 4 GRAM(S): 4 POWDER, FOR SUSPENSION ORAL at 17:15

## 2024-01-01 RX ADMIN — GABAPENTIN 100 MILLIGRAM(S): 400 CAPSULE ORAL at 21:08

## 2024-01-01 RX ADMIN — CHLORHEXIDINE GLUCONATE 1 APPLICATION(S): 213 SOLUTION TOPICAL at 06:48

## 2024-01-01 RX ADMIN — Medication 1: at 16:27

## 2024-01-01 RX ADMIN — Medication 1 CAPSULE(S): at 07:42

## 2024-01-01 RX ADMIN — Medication 1 MILLIGRAM(S): at 23:24

## 2024-01-01 RX ADMIN — Medication 1 EACH: at 19:12

## 2024-01-01 RX ADMIN — Medication 100 MILLIGRAM(S): at 06:16

## 2024-01-01 RX ADMIN — Medication 10 MILLIGRAM(S): at 11:07

## 2024-01-01 RX ADMIN — ENOXAPARIN SODIUM 40 MILLIGRAM(S): 100 INJECTION SUBCUTANEOUS at 12:01

## 2024-01-01 RX ADMIN — Medication 0.25 MILLIGRAM(S): at 22:28

## 2024-01-01 RX ADMIN — Medication 1 DROP(S): at 17:26

## 2024-01-01 RX ADMIN — ONDANSETRON 4 MILLIGRAM(S): 8 TABLET, FILM COATED ORAL at 09:42

## 2024-01-01 RX ADMIN — Medication 50 MILLIGRAM(S): at 17:54

## 2024-01-01 RX ADMIN — Medication 1 CAPSULE(S): at 08:39

## 2024-01-01 RX ADMIN — Medication 0.25 MILLIGRAM(S): at 20:36

## 2024-01-01 RX ADMIN — Medication 150 MILLIGRAM(S): at 06:07

## 2024-01-01 RX ADMIN — Medication 20 MILLIGRAM(S): at 03:01

## 2024-01-01 RX ADMIN — SODIUM CHLORIDE 500 MILLILITER(S): 9 INJECTION INTRAMUSCULAR; INTRAVENOUS; SUBCUTANEOUS at 00:06

## 2024-01-01 RX ADMIN — Medication 100 MILLIGRAM(S): at 05:03

## 2024-01-01 RX ADMIN — Medication 5 MILLIGRAM(S): at 21:54

## 2024-01-01 RX ADMIN — I.V. FAT EMULSION 20.83 GM/KG/DAY: 20 EMULSION INTRAVENOUS at 17:21

## 2024-01-01 RX ADMIN — Medication 10 MILLIGRAM(S): at 16:24

## 2024-01-01 RX ADMIN — Medication 20 MILLIGRAM(S): at 22:03

## 2024-01-01 RX ADMIN — Medication 100 MILLIGRAM(S): at 03:40

## 2024-01-01 RX ADMIN — Medication 1 APPLICATION(S): at 19:15

## 2024-01-01 RX ADMIN — URSODIOL 300 MILLIGRAM(S): 250 TABLET, FILM COATED ORAL at 06:25

## 2024-01-01 RX ADMIN — Medication 40 MILLIGRAM(S): at 06:14

## 2024-01-01 RX ADMIN — Medication 50 MILLIGRAM(S): at 21:48

## 2024-01-01 RX ADMIN — Medication 10 MILLIGRAM(S): at 04:50

## 2024-01-01 RX ADMIN — ENOXAPARIN SODIUM 40 MILLIGRAM(S): 100 INJECTION SUBCUTANEOUS at 10:20

## 2024-01-01 RX ADMIN — ENOXAPARIN SODIUM 40 MILLIGRAM(S): 100 INJECTION SUBCUTANEOUS at 16:33

## 2024-01-01 RX ADMIN — Medication 150 MILLIGRAM(S): at 11:05

## 2024-01-01 RX ADMIN — ERGOCALCIFEROL 50000 UNIT(S): 1.25 CAPSULE ORAL at 05:36

## 2024-01-01 RX ADMIN — CHOLESTYRAMINE 4 GRAM(S): 4 POWDER, FOR SUSPENSION ORAL at 16:57

## 2024-01-01 RX ADMIN — Medication 1 PACKET(S): at 12:00

## 2024-01-01 RX ADMIN — Medication 30 MICROGRAM(S): at 22:24

## 2024-01-01 RX ADMIN — URSODIOL 300 MILLIGRAM(S): 250 TABLET, FILM COATED ORAL at 14:02

## 2024-01-01 RX ADMIN — Medication 50 MILLILITER(S): at 05:27

## 2024-01-01 RX ADMIN — CHLORHEXIDINE GLUCONATE 1 APPLICATION(S): 213 SOLUTION TOPICAL at 06:00

## 2024-01-01 RX ADMIN — Medication 5 MILLIGRAM(S): at 21:53

## 2024-01-01 RX ADMIN — Medication 1 MILLIGRAM(S): at 23:17

## 2024-01-01 RX ADMIN — Medication 10 MILLIGRAM(S): at 23:17

## 2024-01-01 RX ADMIN — Medication 150 MILLIGRAM(S): at 11:50

## 2024-01-01 RX ADMIN — Medication 3 MILLILITER(S): at 05:52

## 2024-01-01 RX ADMIN — Medication 30 MICROGRAM(S): at 22:01

## 2024-01-01 RX ADMIN — HYDROMORPHONE HYDROCHLORIDE 0.2 MILLIGRAM(S): 2 INJECTION INTRAMUSCULAR; INTRAVENOUS; SUBCUTANEOUS at 11:13

## 2024-01-01 RX ADMIN — CHLORHEXIDINE GLUCONATE 1 APPLICATION(S): 213 SOLUTION TOPICAL at 07:12

## 2024-01-01 RX ADMIN — Medication 0.25 MILLIGRAM(S): at 21:16

## 2024-01-01 RX ADMIN — URSODIOL 300 MILLIGRAM(S): 250 TABLET, FILM COATED ORAL at 22:12

## 2024-01-01 RX ADMIN — Medication 1 CAPSULE(S): at 07:39

## 2024-01-01 RX ADMIN — CHOLESTYRAMINE 4 GRAM(S): 4 POWDER, FOR SUSPENSION ORAL at 05:45

## 2024-01-01 RX ADMIN — Medication 100 MILLIGRAM(S): at 22:12

## 2024-01-01 RX ADMIN — Medication 50 MILLIGRAM(S): at 17:38

## 2024-01-01 RX ADMIN — ENOXAPARIN SODIUM 40 MILLIGRAM(S): 100 INJECTION SUBCUTANEOUS at 14:00

## 2024-01-01 RX ADMIN — Medication 10 MILLIGRAM(S): at 06:36

## 2024-01-01 RX ADMIN — ONDANSETRON 4 MILLIGRAM(S): 8 TABLET, FILM COATED ORAL at 02:01

## 2024-01-01 RX ADMIN — Medication 10 MILLIGRAM(S): at 00:39

## 2024-01-01 RX ADMIN — URSODIOL 300 MILLIGRAM(S): 250 TABLET, FILM COATED ORAL at 13:41

## 2024-01-01 RX ADMIN — ONDANSETRON 4 MILLIGRAM(S): 8 TABLET, FILM COATED ORAL at 00:00

## 2024-01-01 RX ADMIN — Medication 1 EACH: at 18:52

## 2024-01-01 RX ADMIN — Medication 3 MILLILITER(S): at 00:37

## 2024-01-01 RX ADMIN — Medication 50 MILLILITER(S): at 13:38

## 2024-01-01 RX ADMIN — ERYTHROPOIETIN 10000 UNIT(S): 10000 INJECTION, SOLUTION INTRAVENOUS; SUBCUTANEOUS at 16:59

## 2024-01-01 RX ADMIN — Medication 100 MILLIEQUIVALENT(S): at 06:52

## 2024-01-01 RX ADMIN — Medication 50 MILLIGRAM(S): at 21:14

## 2024-01-01 RX ADMIN — ACETAZOLAMIDE 105 MILLIGRAM(S): 250 TABLET ORAL at 11:36

## 2024-01-01 RX ADMIN — Medication 1 EACH: at 17:21

## 2024-01-01 RX ADMIN — Medication 100 MILLIGRAM(S): at 06:23

## 2024-01-01 RX ADMIN — CEFEPIME 2000 MILLIGRAM(S): 1 INJECTION, POWDER, FOR SOLUTION INTRAMUSCULAR; INTRAVENOUS at 18:08

## 2024-01-01 RX ADMIN — URSODIOL 300 MILLIGRAM(S): 250 TABLET, FILM COATED ORAL at 14:48

## 2024-01-01 RX ADMIN — Medication 25 MILLIGRAM(S): at 19:06

## 2024-01-01 RX ADMIN — Medication 50 MILLILITER(S): at 06:30

## 2024-01-01 RX ADMIN — ZOLPIDEM TARTRATE 5 MILLIGRAM(S): 10 TABLET ORAL at 21:44

## 2024-01-01 RX ADMIN — Medication 1 CAPSULE(S): at 10:13

## 2024-01-01 RX ADMIN — URSODIOL 300 MILLIGRAM(S): 250 TABLET, FILM COATED ORAL at 22:19

## 2024-01-01 RX ADMIN — Medication 50 MILLILITER(S): at 22:56

## 2024-01-01 RX ADMIN — I.V. FAT EMULSION 20.83 GM/KG/DAY: 20 EMULSION INTRAVENOUS at 18:25

## 2024-01-01 RX ADMIN — OCTREOTIDE ACETATE 10 MICROGRAM(S)/HR: 200 INJECTION, SOLUTION INTRAVENOUS; SUBCUTANEOUS at 23:15

## 2024-01-01 RX ADMIN — ACETAZOLAMIDE 105 MILLIGRAM(S): 250 TABLET ORAL at 13:25

## 2024-01-01 RX ADMIN — Medication 100 GRAM(S): at 09:09

## 2024-01-01 RX ADMIN — Medication 100 MILLIGRAM(S): at 10:44

## 2024-01-01 RX ADMIN — ATORVASTATIN CALCIUM 20 MILLIGRAM(S): 80 TABLET, FILM COATED ORAL at 21:49

## 2024-01-01 RX ADMIN — Medication 100 MILLIGRAM(S): at 17:43

## 2024-01-01 RX ADMIN — Medication 50 MILLIGRAM(S): at 21:25

## 2024-01-01 RX ADMIN — Medication 40 MILLIGRAM(S): at 22:16

## 2024-01-01 RX ADMIN — CHOLESTYRAMINE 4 GRAM(S): 4 POWDER, FOR SUSPENSION ORAL at 17:58

## 2024-01-01 RX ADMIN — Medication 3 MILLILITER(S): at 16:40

## 2024-01-01 RX ADMIN — Medication 5 MILLIGRAM(S): at 05:38

## 2024-01-01 RX ADMIN — I.V. FAT EMULSION 20.83 GM/KG/DAY: 20 EMULSION INTRAVENOUS at 19:20

## 2024-01-01 RX ADMIN — Medication 0.25 MILLIGRAM(S): at 00:02

## 2024-01-01 RX ADMIN — Medication 25 MILLIGRAM(S): at 08:39

## 2024-01-01 RX ADMIN — Medication 50 MICROGRAM(S): at 06:09

## 2024-01-01 RX ADMIN — Medication 3 MILLILITER(S): at 09:55

## 2024-01-01 RX ADMIN — Medication 100 MILLIGRAM(S): at 22:18

## 2024-01-01 RX ADMIN — HEPARIN SODIUM 5000 UNIT(S): 5000 INJECTION INTRAVENOUS; SUBCUTANEOUS at 22:32

## 2024-01-01 RX ADMIN — Medication 1 DROP(S): at 18:18

## 2024-01-01 RX ADMIN — Medication 50 MILLILITER(S): at 23:12

## 2024-01-01 RX ADMIN — Medication 50 MICROGRAM(S): at 05:04

## 2024-01-01 RX ADMIN — HEPARIN SODIUM 5000 UNIT(S): 5000 INJECTION INTRAVENOUS; SUBCUTANEOUS at 13:23

## 2024-01-01 RX ADMIN — CEFEPIME 100 MILLIGRAM(S): 1 INJECTION, POWDER, FOR SOLUTION INTRAMUSCULAR; INTRAVENOUS at 14:25

## 2024-01-01 RX ADMIN — Medication 3 MILLILITER(S): at 11:01

## 2024-01-01 RX ADMIN — OCTREOTIDE ACETATE 10 MICROGRAM(S)/HR: 200 INJECTION, SOLUTION INTRAVENOUS; SUBCUTANEOUS at 02:06

## 2024-01-01 RX ADMIN — I.V. FAT EMULSION 20.83 GM/KG/DAY: 20 EMULSION INTRAVENOUS at 19:48

## 2024-01-01 RX ADMIN — Medication 100 MILLIGRAM(S): at 17:52

## 2024-01-01 RX ADMIN — SODIUM ZIRCONIUM CYCLOSILICATE 5 GRAM(S): 10 POWDER, FOR SUSPENSION ORAL at 23:36

## 2024-01-01 RX ADMIN — Medication 60 MILLIGRAM(S): at 16:58

## 2024-01-01 RX ADMIN — GABAPENTIN 100 MILLIGRAM(S): 400 CAPSULE ORAL at 22:53

## 2024-01-01 RX ADMIN — Medication 10 MILLIGRAM(S): at 11:29

## 2024-01-01 RX ADMIN — Medication 150 MILLIGRAM(S): at 18:28

## 2024-01-01 RX ADMIN — Medication 3 MILLILITER(S): at 01:53

## 2024-01-01 RX ADMIN — Medication 50 MILLIGRAM(S): at 05:46

## 2024-01-01 RX ADMIN — Medication 7.5 MILLIGRAM(S): at 13:37

## 2024-01-01 RX ADMIN — Medication 1 CAPSULE(S): at 18:18

## 2024-01-01 RX ADMIN — Medication 100 MILLIGRAM(S): at 00:20

## 2024-01-01 RX ADMIN — ONDANSETRON 4 MILLIGRAM(S): 8 TABLET, FILM COATED ORAL at 06:21

## 2024-01-01 RX ADMIN — Medication 100 MILLIGRAM(S): at 19:16

## 2024-01-01 RX ADMIN — Medication 1000 MILLIGRAM(S): at 09:20

## 2024-01-01 RX ADMIN — Medication 2: at 05:21

## 2024-01-01 RX ADMIN — CHLORHEXIDINE GLUCONATE 1 APPLICATION(S): 213 SOLUTION TOPICAL at 06:14

## 2024-01-01 RX ADMIN — Medication 0.25 MILLIGRAM(S): at 23:21

## 2024-01-01 RX ADMIN — HYDROMORPHONE HYDROCHLORIDE 0.5 MILLIGRAM(S): 2 INJECTION INTRAMUSCULAR; INTRAVENOUS; SUBCUTANEOUS at 10:47

## 2024-01-01 RX ADMIN — Medication 5 MILLIGRAM(S): at 21:36

## 2024-01-01 RX ADMIN — ONDANSETRON 4 MILLIGRAM(S): 8 TABLET, FILM COATED ORAL at 12:26

## 2024-01-01 RX ADMIN — ERYTHROPOIETIN 10000 UNIT(S): 10000 INJECTION, SOLUTION INTRAVENOUS; SUBCUTANEOUS at 12:22

## 2024-01-01 RX ADMIN — Medication 3 MILLILITER(S): at 22:54

## 2024-01-01 RX ADMIN — CHOLESTYRAMINE 4 GRAM(S): 4 POWDER, FOR SUSPENSION ORAL at 18:37

## 2024-01-01 RX ADMIN — Medication 50 MILLIGRAM(S): at 17:32

## 2024-01-01 RX ADMIN — Medication 37.5 MICROGRAM(S): at 06:31

## 2024-01-01 RX ADMIN — Medication 4: at 06:17

## 2024-01-01 RX ADMIN — Medication 1 EACH: at 18:23

## 2024-01-01 RX ADMIN — Medication 1 CAPSULE(S): at 16:33

## 2024-01-01 RX ADMIN — Medication 3 MILLILITER(S): at 21:24

## 2024-01-01 RX ADMIN — Medication 225 MILLIGRAM(S): at 12:37

## 2024-01-01 RX ADMIN — Medication 40 MILLIGRAM(S): at 15:11

## 2024-01-01 RX ADMIN — Medication 2: at 18:24

## 2024-01-01 RX ADMIN — NYSTATIN CREAM 1 APPLICATION(S): 100000 CREAM TOPICAL at 07:27

## 2024-01-01 RX ADMIN — URSODIOL 300 MILLIGRAM(S): 250 TABLET, FILM COATED ORAL at 14:37

## 2024-01-01 RX ADMIN — HEPARIN SODIUM 5000 UNIT(S): 5000 INJECTION INTRAVENOUS; SUBCUTANEOUS at 21:19

## 2024-01-01 RX ADMIN — HEPARIN SODIUM 5000 UNIT(S): 5000 INJECTION INTRAVENOUS; SUBCUTANEOUS at 13:19

## 2024-01-01 RX ADMIN — Medication 100 MILLIGRAM(S): at 23:28

## 2024-01-01 RX ADMIN — Medication 5 MILLIGRAM(S): at 21:56

## 2024-01-01 RX ADMIN — ENOXAPARIN SODIUM 40 MILLIGRAM(S): 100 INJECTION SUBCUTANEOUS at 13:25

## 2024-01-01 RX ADMIN — HEPARIN SODIUM 5000 UNIT(S): 5000 INJECTION INTRAVENOUS; SUBCUTANEOUS at 05:58

## 2024-01-01 RX ADMIN — Medication 0.5 MILLIGRAM(S): at 21:14

## 2024-01-01 RX ADMIN — Medication 100 MILLIGRAM(S): at 11:07

## 2024-01-01 RX ADMIN — Medication 225 MILLIGRAM(S): at 12:21

## 2024-01-01 RX ADMIN — Medication 1 CAPSULE(S): at 19:54

## 2024-01-01 RX ADMIN — Medication 50 MILLIEQUIVALENT(S): at 11:42

## 2024-01-01 RX ADMIN — Medication 50 MILLIGRAM(S): at 21:29

## 2024-01-01 RX ADMIN — Medication 1 APPLICATION(S): at 13:39

## 2024-01-01 RX ADMIN — Medication 3 MILLILITER(S): at 15:49

## 2024-01-01 RX ADMIN — URSODIOL 300 MILLIGRAM(S): 250 TABLET, FILM COATED ORAL at 06:43

## 2024-01-01 RX ADMIN — Medication 5 MILLIGRAM(S): at 21:57

## 2024-01-01 RX ADMIN — Medication 5 MILLIGRAM(S): at 19:52

## 2024-01-01 RX ADMIN — I.V. FAT EMULSION 20.83 GM/KG/DAY: 20 EMULSION INTRAVENOUS at 17:40

## 2024-01-01 RX ADMIN — CHLORHEXIDINE GLUCONATE 1 APPLICATION(S): 213 SOLUTION TOPICAL at 11:22

## 2024-01-01 RX ADMIN — URSODIOL 300 MILLIGRAM(S): 250 TABLET, FILM COATED ORAL at 14:01

## 2024-01-01 RX ADMIN — Medication 50 MILLILITER(S): at 21:06

## 2024-01-01 RX ADMIN — Medication 0.5 MILLIGRAM(S): at 22:18

## 2024-01-01 RX ADMIN — Medication 30 MICROGRAM(S): at 23:02

## 2024-01-01 RX ADMIN — CEFEPIME 100 MILLIGRAM(S): 1 INJECTION, POWDER, FOR SOLUTION INTRAMUSCULAR; INTRAVENOUS at 21:57

## 2024-01-01 RX ADMIN — Medication 50 MILLILITER(S): at 10:23

## 2024-01-01 RX ADMIN — Medication 40 MICROGRAM(S): at 07:33

## 2024-01-01 RX ADMIN — Medication 300 MILLIGRAM(S): at 06:56

## 2024-01-01 RX ADMIN — URSODIOL 300 MILLIGRAM(S): 250 TABLET, FILM COATED ORAL at 13:27

## 2024-01-01 RX ADMIN — Medication 10 MILLIGRAM(S): at 22:41

## 2024-01-01 RX ADMIN — Medication 37.5 MICROGRAM(S): at 07:24

## 2024-01-01 RX ADMIN — CHLORHEXIDINE GLUCONATE 1 APPLICATION(S): 213 SOLUTION TOPICAL at 05:24

## 2024-01-01 RX ADMIN — Medication 50 MILLIEQUIVALENT(S): at 12:13

## 2024-01-01 RX ADMIN — Medication 10 MILLIGRAM(S): at 23:24

## 2024-01-01 RX ADMIN — Medication 100 MILLIGRAM(S): at 06:35

## 2024-01-01 RX ADMIN — Medication 40 MILLIGRAM(S): at 09:44

## 2024-01-01 RX ADMIN — Medication 20 MILLIGRAM(S): at 04:05

## 2024-01-01 RX ADMIN — URSODIOL 300 MILLIGRAM(S): 250 TABLET, FILM COATED ORAL at 21:14

## 2024-01-01 RX ADMIN — Medication 3 MILLILITER(S): at 09:08

## 2024-01-01 RX ADMIN — Medication 100 MILLIGRAM(S): at 17:51

## 2024-01-01 RX ADMIN — SODIUM CHLORIDE 500 MILLILITER(S): 9 INJECTION INTRAMUSCULAR; INTRAVENOUS; SUBCUTANEOUS at 06:55

## 2024-01-01 RX ADMIN — Medication 1 MILLIGRAM(S): at 22:18

## 2024-01-01 RX ADMIN — Medication 1 EACH: at 17:13

## 2024-01-01 RX ADMIN — Medication 3 MILLILITER(S): at 10:09

## 2024-01-01 RX ADMIN — Medication 5 MILLIGRAM(S): at 23:32

## 2024-01-01 RX ADMIN — Medication 100 GRAM(S): at 14:09

## 2024-01-01 RX ADMIN — Medication 50 MILLIEQUIVALENT(S): at 08:14

## 2024-01-01 RX ADMIN — Medication 1 CAPSULE(S): at 12:13

## 2024-01-01 RX ADMIN — CHOLESTYRAMINE 4 GRAM(S): 4 POWDER, FOR SUSPENSION ORAL at 17:51

## 2024-01-01 RX ADMIN — HEPARIN SODIUM 5000 UNIT(S): 5000 INJECTION INTRAVENOUS; SUBCUTANEOUS at 21:48

## 2024-01-01 RX ADMIN — Medication 50 MILLIGRAM(S): at 06:21

## 2024-01-01 RX ADMIN — URSODIOL 300 MILLIGRAM(S): 250 TABLET, FILM COATED ORAL at 07:17

## 2024-01-01 RX ADMIN — URSODIOL 300 MILLIGRAM(S): 250 TABLET, FILM COATED ORAL at 07:25

## 2024-01-01 RX ADMIN — Medication 5 MILLIGRAM(S): at 21:47

## 2024-01-01 RX ADMIN — CHLORHEXIDINE GLUCONATE 1 APPLICATION(S): 213 SOLUTION TOPICAL at 06:29

## 2024-01-01 RX ADMIN — Medication 150 MILLIGRAM(S): at 12:39

## 2024-01-01 RX ADMIN — Medication 300 MILLIGRAM(S): at 11:37

## 2024-01-01 RX ADMIN — Medication 300 MILLIGRAM(S): at 06:17

## 2024-01-01 RX ADMIN — PANTOPRAZOLE SODIUM 40 MILLIGRAM(S): 20 TABLET, DELAYED RELEASE ORAL at 21:49

## 2024-01-01 RX ADMIN — CHOLESTYRAMINE 4 GRAM(S): 4 POWDER, FOR SUSPENSION ORAL at 17:33

## 2024-01-01 RX ADMIN — HEPARIN SODIUM 5000 UNIT(S): 5000 INJECTION INTRAVENOUS; SUBCUTANEOUS at 05:32

## 2024-01-01 RX ADMIN — Medication 3 MILLILITER(S): at 12:59

## 2024-01-01 RX ADMIN — Medication 5 MILLIGRAM(S): at 02:09

## 2024-01-01 RX ADMIN — Medication 100 MILLIGRAM(S): at 05:43

## 2024-01-01 RX ADMIN — Medication 1: at 06:09

## 2024-01-01 RX ADMIN — Medication 50 MICROGRAM(S): at 06:21

## 2024-01-01 RX ADMIN — Medication 10 MILLIGRAM(S): at 12:10

## 2024-01-01 RX ADMIN — ONDANSETRON 4 MILLIGRAM(S): 8 TABLET, FILM COATED ORAL at 18:51

## 2024-01-01 RX ADMIN — Medication 20 MILLIGRAM(S): at 10:20

## 2024-01-01 RX ADMIN — EPINEPHRINE 1 MILLIGRAM(S): 0.3 INJECTION INTRAMUSCULAR; SUBCUTANEOUS at 22:00

## 2024-01-01 RX ADMIN — Medication 100 MILLIGRAM(S): at 06:37

## 2024-01-01 RX ADMIN — Medication 225 MILLIGRAM(S): at 11:40

## 2024-01-01 RX ADMIN — CARVEDILOL PHOSPHATE 6.25 MILLIGRAM(S): 80 CAPSULE, EXTENDED RELEASE ORAL at 21:45

## 2024-01-01 RX ADMIN — Medication 5 MILLIGRAM(S): at 00:20

## 2024-01-01 RX ADMIN — Medication 1 EACH: at 17:34

## 2024-01-01 RX ADMIN — Medication 1 CAPSULE(S): at 16:43

## 2024-01-01 RX ADMIN — Medication 5 MILLIGRAM(S): at 23:04

## 2024-01-01 RX ADMIN — CHLORHEXIDINE GLUCONATE 1 APPLICATION(S): 213 SOLUTION TOPICAL at 06:05

## 2024-01-01 RX ADMIN — Medication 50 MILLILITER(S): at 14:27

## 2024-01-01 RX ADMIN — Medication 150 MILLIGRAM(S): at 11:44

## 2024-01-01 RX ADMIN — Medication 100 MILLIGRAM(S): at 05:46

## 2024-01-01 RX ADMIN — Medication 1 MILLIGRAM(S): at 22:06

## 2024-01-01 RX ADMIN — Medication 10 MILLIGRAM(S): at 06:43

## 2024-01-01 RX ADMIN — Medication 0.25 MILLIGRAM(S): at 00:21

## 2024-01-01 RX ADMIN — Medication 20 MILLIGRAM(S): at 07:48

## 2024-01-01 RX ADMIN — Medication 10 MILLIGRAM(S): at 03:01

## 2024-01-01 RX ADMIN — HYDROMORPHONE HYDROCHLORIDE 0.25 MILLIGRAM(S): 2 INJECTION INTRAMUSCULAR; INTRAVENOUS; SUBCUTANEOUS at 10:35

## 2024-01-01 RX ADMIN — Medication 100 MILLIGRAM(S): at 09:00

## 2024-01-01 RX ADMIN — Medication 10 MILLIGRAM(S): at 17:37

## 2024-01-01 RX ADMIN — HYDROMORPHONE HYDROCHLORIDE 1 MILLIGRAM(S): 2 INJECTION INTRAMUSCULAR; INTRAVENOUS; SUBCUTANEOUS at 12:39

## 2024-01-01 RX ADMIN — Medication 100 MILLIGRAM(S): at 05:55

## 2024-01-01 RX ADMIN — ENOXAPARIN SODIUM 40 MILLIGRAM(S): 100 INJECTION SUBCUTANEOUS at 11:10

## 2024-01-01 RX ADMIN — Medication 20 MILLIGRAM(S): at 14:29

## 2024-01-01 RX ADMIN — CHLORHEXIDINE GLUCONATE 1 APPLICATION(S): 213 SOLUTION TOPICAL at 11:23

## 2024-01-01 RX ADMIN — Medication 50 MICROGRAM(S): at 06:00

## 2024-01-01 RX ADMIN — GABAPENTIN 100 MILLIGRAM(S): 400 CAPSULE ORAL at 21:32

## 2024-01-01 RX ADMIN — CEFEPIME 100 MILLIGRAM(S): 1 INJECTION, POWDER, FOR SOLUTION INTRAMUSCULAR; INTRAVENOUS at 10:05

## 2024-01-01 RX ADMIN — URSODIOL 300 MILLIGRAM(S): 250 TABLET, FILM COATED ORAL at 21:10

## 2024-01-01 RX ADMIN — SODIUM CHLORIDE 1000 MILLILITER(S): 9 INJECTION, SOLUTION INTRAVENOUS at 17:11

## 2024-01-01 RX ADMIN — Medication 50 MILLILITER(S): at 14:53

## 2024-01-01 RX ADMIN — Medication 1 EACH: at 17:26

## 2024-01-01 RX ADMIN — Medication 0.5 MILLIGRAM(S): at 21:13

## 2024-01-01 RX ADMIN — URSODIOL 300 MILLIGRAM(S): 250 TABLET, FILM COATED ORAL at 22:44

## 2024-01-01 RX ADMIN — Medication 300 MILLIGRAM(S): at 13:33

## 2024-01-01 RX ADMIN — Medication 100 MILLIGRAM(S): at 10:24

## 2024-01-01 RX ADMIN — ZOLPIDEM TARTRATE 5 MILLIGRAM(S): 10 TABLET ORAL at 07:03

## 2024-01-01 RX ADMIN — Medication 1 MILLIGRAM(S): at 06:03

## 2024-01-01 RX ADMIN — Medication 37.5 MICROGRAM(S): at 06:24

## 2024-01-01 RX ADMIN — Medication 1 EACH: at 17:14

## 2024-01-01 RX ADMIN — Medication 0.25 MILLIGRAM(S): at 21:31

## 2024-01-01 RX ADMIN — Medication 30 MICROGRAM(S): at 22:36

## 2024-01-01 RX ADMIN — CHLORHEXIDINE GLUCONATE 1 APPLICATION(S): 213 SOLUTION TOPICAL at 05:01

## 2024-01-01 RX ADMIN — Medication 50 MICROGRAM(S): at 06:24

## 2024-01-01 RX ADMIN — HYDROMORPHONE HYDROCHLORIDE 0.5 MILLIGRAM(S): 2 INJECTION INTRAMUSCULAR; INTRAVENOUS; SUBCUTANEOUS at 10:39

## 2024-01-01 RX ADMIN — Medication 150 MILLIGRAM(S): at 17:52

## 2024-01-01 RX ADMIN — Medication 5 MILLIGRAM(S): at 05:20

## 2024-01-01 RX ADMIN — Medication 50 MICROGRAM(S): at 06:56

## 2024-01-01 RX ADMIN — URSODIOL 300 MILLIGRAM(S): 250 TABLET, FILM COATED ORAL at 04:23

## 2024-01-01 RX ADMIN — Medication 0.25 MILLIGRAM(S): at 01:58

## 2024-01-01 RX ADMIN — Medication 100 MILLIGRAM(S): at 05:33

## 2024-01-01 RX ADMIN — Medication 5 MILLIGRAM(S): at 11:21

## 2024-01-01 RX ADMIN — Medication 0.5 MILLIGRAM(S): at 00:20

## 2024-01-01 RX ADMIN — Medication 50 MILLIEQUIVALENT(S): at 13:25

## 2024-01-01 RX ADMIN — Medication 10 MILLIGRAM(S): at 00:11

## 2024-01-01 RX ADMIN — Medication 50 MILLIGRAM(S): at 12:54

## 2024-01-01 RX ADMIN — ONDANSETRON 4 MILLIGRAM(S): 8 TABLET, FILM COATED ORAL at 14:20

## 2024-01-01 RX ADMIN — Medication 50 MILLIGRAM(S): at 21:15

## 2024-01-01 RX ADMIN — Medication 3 MILLILITER(S): at 10:16

## 2024-01-01 RX ADMIN — Medication 50 MILLIGRAM(S): at 06:05

## 2024-01-01 RX ADMIN — Medication 100 MILLIEQUIVALENT(S): at 07:13

## 2024-01-01 RX ADMIN — Medication 5 MILLIGRAM(S): at 21:28

## 2024-01-01 RX ADMIN — Medication 0.25 MILLIGRAM(S): at 00:28

## 2024-01-01 RX ADMIN — ENOXAPARIN SODIUM 90 MILLIGRAM(S): 100 INJECTION SUBCUTANEOUS at 11:43

## 2024-01-01 RX ADMIN — Medication 100 MILLIGRAM(S): at 18:21

## 2024-01-01 RX ADMIN — URSODIOL 300 MILLIGRAM(S): 250 TABLET, FILM COATED ORAL at 20:55

## 2024-01-01 RX ADMIN — ONDANSETRON 4 MILLIGRAM(S): 8 TABLET, FILM COATED ORAL at 17:00

## 2024-01-01 RX ADMIN — URSODIOL 300 MILLIGRAM(S): 250 TABLET, FILM COATED ORAL at 13:26

## 2024-01-01 RX ADMIN — Medication 3 MILLILITER(S): at 10:04

## 2024-01-01 RX ADMIN — CARVEDILOL PHOSPHATE 6.25 MILLIGRAM(S): 80 CAPSULE, EXTENDED RELEASE ORAL at 21:02

## 2024-01-01 RX ADMIN — ONDANSETRON 4 MILLIGRAM(S): 8 TABLET, FILM COATED ORAL at 21:24

## 2024-01-01 RX ADMIN — CHLORHEXIDINE GLUCONATE 1 APPLICATION(S): 213 SOLUTION TOPICAL at 06:27

## 2024-01-01 RX ADMIN — TEDUGLUTIDE 4.7 MILLIGRAM(S): KIT at 12:37

## 2024-01-01 RX ADMIN — Medication 150 MILLIGRAM(S): at 11:36

## 2024-01-01 RX ADMIN — Medication 1 CAPSULE(S): at 11:59

## 2024-01-01 RX ADMIN — Medication 100 MILLIGRAM(S): at 19:18

## 2024-01-01 RX ADMIN — Medication 1 EACH: at 19:44

## 2024-01-01 RX ADMIN — Medication 1: at 17:49

## 2024-01-01 RX ADMIN — Medication 50 MILLILITER(S): at 07:08

## 2024-01-01 RX ADMIN — URSODIOL 300 MILLIGRAM(S): 250 TABLET, FILM COATED ORAL at 06:48

## 2024-01-01 RX ADMIN — ONDANSETRON 4 MILLIGRAM(S): 8 TABLET, FILM COATED ORAL at 07:17

## 2024-01-01 RX ADMIN — I.V. FAT EMULSION 20.83 GM/KG/DAY: 20 EMULSION INTRAVENOUS at 17:34

## 2024-01-01 RX ADMIN — ATORVASTATIN CALCIUM 20 MILLIGRAM(S): 80 TABLET, FILM COATED ORAL at 21:58

## 2024-01-01 RX ADMIN — Medication 50 MILLIEQUIVALENT(S): at 10:29

## 2024-01-01 RX ADMIN — ZOLPIDEM TARTRATE 5 MILLIGRAM(S): 10 TABLET ORAL at 23:55

## 2024-01-01 RX ADMIN — Medication 3 MILLILITER(S): at 06:26

## 2024-01-01 RX ADMIN — ATORVASTATIN CALCIUM 20 MILLIGRAM(S): 80 TABLET, FILM COATED ORAL at 21:01

## 2024-01-01 RX ADMIN — SODIUM CHLORIDE 1000 MILLILITER(S): 9 INJECTION, SOLUTION INTRAVENOUS at 03:16

## 2024-01-01 RX ADMIN — Medication 1 MILLIGRAM(S): at 21:10

## 2024-01-01 RX ADMIN — URSODIOL 300 MILLIGRAM(S): 250 TABLET, FILM COATED ORAL at 13:11

## 2024-01-01 RX ADMIN — Medication 300 MILLIGRAM(S): at 17:55

## 2024-01-01 RX ADMIN — Medication 10 MILLIGRAM(S): at 23:06

## 2024-01-01 RX ADMIN — Medication 10 MILLIGRAM(S): at 17:12

## 2024-01-01 RX ADMIN — Medication 5 MILLIGRAM(S): at 22:27

## 2024-01-01 RX ADMIN — Medication 10 MILLIGRAM(S): at 06:03

## 2024-01-01 RX ADMIN — Medication 100 MILLIGRAM(S): at 14:13

## 2024-01-01 RX ADMIN — Medication 10 MILLIGRAM(S): at 17:24

## 2024-01-01 RX ADMIN — HYDROMORPHONE HYDROCHLORIDE 0.5 MILLIGRAM(S): 2 INJECTION INTRAMUSCULAR; INTRAVENOUS; SUBCUTANEOUS at 11:13

## 2024-01-01 RX ADMIN — Medication 10 MILLIGRAM(S): at 06:47

## 2024-01-01 RX ADMIN — SPIRONOLACTONE 25 MILLIGRAM(S): 25 TABLET, FILM COATED ORAL at 11:42

## 2024-01-01 RX ADMIN — ZOLPIDEM TARTRATE 5 MILLIGRAM(S): 10 TABLET ORAL at 23:32

## 2024-01-01 RX ADMIN — Medication 1 APPLICATION(S): at 17:52

## 2024-01-01 RX ADMIN — HYDROMORPHONE HYDROCHLORIDE 0.5 MILLIGRAM(S): 2 INJECTION INTRAMUSCULAR; INTRAVENOUS; SUBCUTANEOUS at 10:55

## 2024-01-01 RX ADMIN — CHOLESTYRAMINE 4 GRAM(S): 4 POWDER, FOR SUSPENSION ORAL at 15:14

## 2024-01-01 RX ADMIN — Medication 1 CAPSULE(S): at 18:06

## 2024-01-01 RX ADMIN — Medication 100 MILLIGRAM(S): at 07:46

## 2024-01-01 RX ADMIN — Medication 10 MILLIGRAM(S): at 06:24

## 2024-01-01 RX ADMIN — Medication 50 MILLIGRAM(S): at 18:00

## 2024-01-01 RX ADMIN — Medication 5 MILLIGRAM(S): at 12:11

## 2024-01-01 RX ADMIN — Medication 3 MILLILITER(S): at 21:49

## 2024-01-01 RX ADMIN — Medication 1 CAPSULE(S): at 18:34

## 2024-01-01 RX ADMIN — Medication 20 MILLIGRAM(S): at 01:28

## 2024-01-01 RX ADMIN — URSODIOL 300 MILLIGRAM(S): 250 TABLET, FILM COATED ORAL at 05:32

## 2024-01-01 RX ADMIN — URSODIOL 300 MILLIGRAM(S): 250 TABLET, FILM COATED ORAL at 05:59

## 2024-01-01 RX ADMIN — Medication 0.5 MILLIGRAM(S): at 22:15

## 2024-01-01 RX ADMIN — GABAPENTIN 100 MILLIGRAM(S): 400 CAPSULE ORAL at 21:26

## 2024-01-01 RX ADMIN — ERGOCALCIFEROL 50000 UNIT(S): 1.25 CAPSULE ORAL at 10:01

## 2024-01-01 RX ADMIN — HEPARIN SODIUM 5000 UNIT(S): 5000 INJECTION INTRAVENOUS; SUBCUTANEOUS at 06:19

## 2024-01-01 RX ADMIN — TEDUGLUTIDE 4.7 MILLIGRAM(S): KIT at 11:05

## 2024-01-01 RX ADMIN — Medication 20 MILLIGRAM(S): at 22:27

## 2024-01-01 RX ADMIN — Medication 2: at 11:44

## 2024-01-01 RX ADMIN — Medication 25 MILLIGRAM(S): at 22:22

## 2024-01-01 RX ADMIN — Medication 100 MILLIGRAM(S): at 12:37

## 2024-01-01 RX ADMIN — HYDROMORPHONE HYDROCHLORIDE 0.2 MILLIGRAM(S): 2 INJECTION INTRAMUSCULAR; INTRAVENOUS; SUBCUTANEOUS at 11:30

## 2024-01-01 RX ADMIN — Medication 100 MILLIGRAM(S): at 11:41

## 2024-01-01 RX ADMIN — ONDANSETRON 4 MILLIGRAM(S): 8 TABLET, FILM COATED ORAL at 05:42

## 2024-01-01 RX ADMIN — Medication 3 MILLILITER(S): at 10:01

## 2024-01-01 RX ADMIN — URSODIOL 300 MILLIGRAM(S): 250 TABLET, FILM COATED ORAL at 06:23

## 2024-01-01 RX ADMIN — POTASSIUM PHOSPHATE, MONOBASIC POTASSIUM PHOSPHATE, DIBASIC 62.5 MILLIMOLE(S): 236; 224 INJECTION, SOLUTION INTRAVENOUS at 08:38

## 2024-01-01 RX ADMIN — Medication 50 MILLIGRAM(S): at 09:39

## 2024-01-01 RX ADMIN — GABAPENTIN 100 MILLIGRAM(S): 400 CAPSULE ORAL at 21:51

## 2024-01-01 NOTE — PROGRESS NOTE ADULT - SUBJECTIVE AND OBJECTIVE BOX
Interval Events:  Patient seen and examined at bedside.      Allergies    penicillin (Angioedema)    Intolerances        Vital Signs Last 24 Hrs  T(C): 36.7 (01 Jan 2024 04:58), Max: 37 (31 Dec 2023 08:29)  T(F): 98.1 (01 Jan 2024 04:58), Max: 98.6 (31 Dec 2023 08:29)  HR: 103 (01 Jan 2024 05:53) (100 - 103)  BP: 119/61 (01 Jan 2024 05:53) (99/54 - 148/70)  BP(mean): 84 (01 Jan 2024 05:53) (71 - 98)  RR: 23 (01 Jan 2024 05:53) (21 - 28)  SpO2: 100% (01 Jan 2024 05:53) (94% - 100%)    Parameters below as of 01 Jan 2024 05:53  Patient On (Oxygen Delivery Method): room air        12-31 @ 07:01 - 01-01 @ 07:00  --------------------------------------------------------  IN: 5297.4 mL / OUT: 4250 mL / NET: 1047.4 mL      12-31 @ 07:01 - 01-01 @ 07:00  --------------------------------------------------------  IN: 5297.4 mL / OUT: 4250 mL / NET: 1047.4 mL        Physical Exam:     Gen: NAD well nourished  Neuro: A&OX3 No deficits  CV:RRR Reg s1s2 noM  Pulm: CTA b/l No w/r/r  Abd: Soft, ND, NT, no rebound tenderness, no guarding, ECF pouched wound manager in place, PCT bilious  Ext: No C/C/E   Vasc: + DP b/l   Skin: no rashes noted  MSK: No joint swelling  Psych: No signs of anxiety or depression      LABS:      CBC Full  -  ( 01 Jan 2024 05:30 )  WBC Count : 7.02 K/uL  RBC Count : 2.83 M/uL  Hemoglobin : 8.5 g/dL  Hematocrit : 27.8 %  Platelet Count - Automated : 177 K/uL  Mean Cell Volume : 98.2 fl  Mean Cell Hemoglobin : 30.0 pg  Mean Cell Hemoglobin Concentration : 30.6 gm/dL  Auto Neutrophil # : x  Auto Lymphocyte # : x  Auto Monocyte # : x  Auto Eosinophil # : x  Auto Basophil # : x  Auto Neutrophil % : x  Auto Lymphocyte % : x  Auto Monocyte % : x  Auto Eosinophil % : x  Auto Basophil % : x    01-01    134<L>  |  101  |  48<H>  ----------------------------<  138<H>  4.2   |  26  |  1.61<H>    Ca    8.4      01 Jan 2024 05:30  Phos  3.3     01-01  Mg     2.2     01-01    TPro  8.3  /  Alb  2.3<L>  /  TBili  6.2<H>  /  DBili  4.2<H>  /  AST  96<H>  /  ALT  68<H>  /  AlkPhos  123<H>  01-01    PT/INR - ( 01 Jan 2024 05:30 )   PT: 14.6 sec;   INR: 1.29                Urinalysis Basic - ( 01 Jan 2024 05:30 )    Color: x / Appearance: x / SG: x / pH: x  Gluc: 138 mg/dL / Ketone: x  / Bili: x / Urobili: x   Blood: x / Protein: x / Nitrite: x   Leuk Esterase: x / RBC: x / WBC x   Sq Epi: x / Non Sq Epi: x / Bacteria: x              RADIOLOGY & ADDITIONAL STUDIES (The following images were personally reviewed):          A/p:78M w PMH Crohn's, AFib/Flutter s/p DCCVs on amiodarone, remote ileocectomy and open appendectomy. Admitted (6/23) for SBO vs Crohns flare, s/p NGT decompression and s/p lap converted to open TRE, SBR x 3, left in discontinuity with abthera vac on (6/27), RTOR for ileocolic resection, small bowel anastomosis, and abdominal wall closure on (6/28), c/b fluid collection s/p IR aspiration of perihepatic fluid on (7/3), c/b wound dehiscence s/p RTOR exlap, washout, ileocolic resection with end ileostomy, blow hole colostomy, red rubber from ileostomy to small bowel anastomosis; vicryl bridging mesh on (7/5) transferred to SICU postoperatively for hemodynamic monitoring, with hospital course complicated by periods of severe ELVI and hyponatremia, which resolved but stepped back up for to SICU on (9/10) for acute AMS, intermittent hypoglycemia, AFib with RVR. Percutaneous Cholecystostomy placed on (9/11) for enlarged GB, PCT capped 10/5 and uncapped 10/25 for hyperbilirubinemia, Right brachial DVT, left basillic and cephalic superficial thrombus on duplex 11/2, CT 11/14 with ALDEN ground glass opacity of unclear etiology, completed empiric 7day cefepime course 11/22, rising T-bili of unclear etilogy, now w resolved candida glabrata fungemia, ELVI improving.     NEURO: Hx of depression: cont Effexor (halved dose in setting of renal dysfunx). Anxiety: Lorazepam PRN. Holistic consult for anxiety and insomnia.   CV: Hx Afib/flutter: s/p DCCV, off Amio and lipitor due to persistent transaminitis, continue labetalol IV 10q6h. TTE  (7/18) - PASP 64mmHg, EF 65-70%. holding Losartan & norvasc because not absorbing PO meds, hydralazine PRN.   PULM: Atelectasis/dyspnea improved clinically: cont 1 hr of BiPAP every 12hrs of nasal canula or room air. Encourage OOB and IS.  CT from 11/14 with ALDEN ground glass opacity of unclear etiology. COVID negative. RVP negative. completed 7d empiric cefepime 11/22 to cover for potential PNA.   GI/FEN: Low res, low fat, high protein diet. Cont Ensure max x1/day. Folate, thiamine. PICC replaced 12/4, will switch out PICC on 1/4. continue TPN/lipids. High output EC fistula: cont Octreotide & Cholestyramine 10/18. Transaminitis elevated T bili of unclear origin, s/p Perc Deb on 9/11, failed capping trial; seen by Hepatology - MRCP 10/30 no obstruction, cont ursodiol, RUQ US 11/25 CBD 5mm, hepatic vessels patent  : Voids. ELVI improved: stopped famotidine given renal dysfunction. MARLO Duplex and RP US unremarkable, CK wnl.    ENDO: Hx hypothyroid: IV Synthroid, TSH low on 11/30, decreased synthroid dose, repeat TSH 12/6 wnl.  ID: currently not on any abx/antifungals. BCx 11/22 grew candida glabrata, likely CLABSI. s/p Caspo & completed Fluconazole course (12/1-12/9). Ophthalmology evaluated - normal ocular exam. Echo no vegetation. PICC replaced on 12/4. Cont Nystatin powder. // DC: caspofungin (11/24-12/1). empiric 7days cefepime (11/15-11/22), C. tertium, Lactobacillis from IR cx 7/3, and candida albicans, lactobacillus, vanc sensitive E faecium, vanc resistant E gallinarum, vanc resistant E casseliflavus, lactobacillus from OR cx 7/5. Completed course of abx with Imipenem (9/10-9/15). Imipenem (8/26--) imipenem (6/30-7/12, 7/23-7/30), Dapto (6/30-7/5 and 7/23-7/24). Empiric dapto (8/23-24) and cefepime (8/23-24).   PPX: SCDs, SQH, was on Therapeutic lovenox bid for R brachial vein DVT, but will hold off on anticoagulation since repeat US Duplex negative.  HEME: Anemia - continue Epogen weekly. S/p Iron Sucrose 200 qd x 3 days for chronic anemia. transfuse 1U PRBC for symptomatic anemia  LINES: ZAHEER Pinto PICC (12/4 - ) will plan to switch PICC once a month (around 1/4) // DC: LUE PICC (9/1-11/1 ), RUE PICC: (11/1-11/24)   WOUNDS/DRAINS: Abdominal wound and fistula with wound manager in place dressing change Tuesday and Friday, had punctate bleeding at wound bed, s/p placed surgicell over - not bleeding at this time. RLQ Ostomy. IR perc deb tube. // DC: L Clay drain.  PT: Ambulate as tolerated OOB to chair daily.  DISPO: SICU due to complicated hospital course.    Interval Events:  No Events overnight   Patient seen and examined at bedside.      Allergies    penicillin (Angioedema)    Intolerances        Vital Signs Last 24 Hrs  T(C): 36.7 (01 Jan 2024 04:58), Max: 37 (31 Dec 2023 08:29)  T(F): 98.1 (01 Jan 2024 04:58), Max: 98.6 (31 Dec 2023 08:29)  HR: 103 (01 Jan 2024 05:53) (100 - 103)  BP: 119/61 (01 Jan 2024 05:53) (99/54 - 148/70)  BP(mean): 84 (01 Jan 2024 05:53) (71 - 98)  RR: 23 (01 Jan 2024 05:53) (21 - 28)  SpO2: 100% (01 Jan 2024 05:53) (94% - 100%)    Parameters below as of 01 Jan 2024 05:53  Patient On (Oxygen Delivery Method): room air        12-31 @ 07:01 - 01-01 @ 07:00  --------------------------------------------------------  IN: 5297.4 mL / OUT: 4250 mL / NET: 1047.4 mL      12-31 @ 07:01  - 01-01 @ 07:00  --------------------------------------------------------  IN: 5297.4 mL / OUT: 4250 mL / NET: 1047.4 mL        Physical Exam:     Gen: NAD well nourished  Neuro: A&OX3 No deficits  CV:RRR Reg s1s2 noM  Pulm: CTA b/l No w/r/r  Abd: Soft, ND, NT, no rebound tenderness, no guarding, ECF pouched wound manager in place, PCT bilious  Ext: No C/C/E   Vasc: + DP b/l   Skin: no rashes noted  MSK: No joint swelling  Psych: No signs of anxiety or depression      LABS:      CBC Full  -  ( 01 Jan 2024 05:30 )  WBC Count : 7.02 K/uL  RBC Count : 2.83 M/uL  Hemoglobin : 8.5 g/dL  Hematocrit : 27.8 %  Platelet Count - Automated : 177 K/uL  Mean Cell Volume : 98.2 fl  Mean Cell Hemoglobin : 30.0 pg  Mean Cell Hemoglobin Concentration : 30.6 gm/dL  Auto Neutrophil # : x  Auto Lymphocyte # : x  Auto Monocyte # : x  Auto Eosinophil # : x  Auto Basophil # : x  Auto Neutrophil % : x  Auto Lymphocyte % : x  Auto Monocyte % : x  Auto Eosinophil % : x  Auto Basophil % : x    01-01    134<L>  |  101  |  48<H>  ----------------------------<  138<H>  4.2   |  26  |  1.61<H>    Ca    8.4      01 Jan 2024 05:30  Phos  3.3     01-01  Mg     2.2     01-01    TPro  8.3  /  Alb  2.3<L>  /  TBili  6.2<H>  /  DBili  4.2<H>  /  AST  96<H>  /  ALT  68<H>  /  AlkPhos  123<H>  01-01    PT/INR - ( 01 Jan 2024 05:30 )   PT: 14.6 sec;   INR: 1.29                Urinalysis Basic - ( 01 Jan 2024 05:30 )    Color: x / Appearance: x / SG: x / pH: x  Gluc: 138 mg/dL / Ketone: x  / Bili: x / Urobili: x   Blood: x / Protein: x / Nitrite: x   Leuk Esterase: x / RBC: x / WBC x   Sq Epi: x / Non Sq Epi: x / Bacteria: x              RADIOLOGY & ADDITIONAL STUDIES (The following images were personally reviewed):          A/p:78M w PMH Crohn's, AFib/Flutter s/p DCCVs on amiodarone, remote ileocectomy and open appendectomy. Admitted (6/23) for SBO vs Crohns flare, s/p NGT decompression and s/p lap converted to open TRE, SBR x 3, left in discontinuity with abthera vac on (6/27), RTOR for ileocolic resection, small bowel anastomosis, and abdominal wall closure on (6/28), c/b fluid collection s/p IR aspiration of perihepatic fluid on (7/3), c/b wound dehiscence s/p RTOR exlap, washout, ileocolic resection with end ileostomy, blow hole colostomy, red rubber from ileostomy to small bowel anastomosis; vicryl bridging mesh on (7/5) transferred to SICU postoperatively for hemodynamic monitoring, with hospital course complicated by periods of severe ELVI and hyponatremia, which resolved but stepped back up for to SICU on (9/10) for acute AMS, intermittent hypoglycemia, AFib with RVR. Percutaneous Cholecystostomy placed on (9/11) for enlarged GB, PCT capped 10/5 and uncapped 10/25 for hyperbilirubinemia, Right brachial DVT, left basillic and cephalic superficial thrombus on duplex 11/2, CT 11/14 with ALDEN ground glass opacity of unclear etiology, completed empiric 7day cefepime course 11/22, rising T-bili of unclear etilogy, now w resolved candida glabrata fungemia, ELVI improving.     NEURO: Hx of depression: cont Effexor (halved dose in setting of renal dysfunx). Anxiety: Lorazepam PRN. Holistic consult for anxiety and insomnia.   CV: Hx Afib/flutter: s/p DCCV, off Amio and lipitor due to persistent transaminitis, continue labetalol IV 10q6h. TTE  (7/18) - PASP 64mmHg, EF 65-70%. holding Losartan & norvasc because not absorbing PO meds, hydralazine PRN.   PULM: Atelectasis/dyspnea improved clinically: cont 1 hr of BiPAP every 12hrs of nasal canula or room air. Encourage OOB and IS.  CT from 11/14 with ALDEN ground glass opacity of unclear etiology. COVID negative. RVP negative. completed 7d empiric cefepime 11/22 to cover for potential PNA.   GI/FEN: Low res, low fat, high protein diet. Cont Ensure max x1/day. Folate, thiamine. PICC replaced 12/4, will switch out PICC on 1/4. continue TPN/lipids. High output EC fistula: cont Octreotide & Cholestyramine 10/18. Transaminitis elevated T bili of unclear origin, s/p Perc Deb on 9/11, failed capping trial; seen by Hepatology - MRCP 10/30 no obstruction, cont ursodiol, RUQ US 11/25 CBD 5mm, hepatic vessels patent  : Voids. ELVI improved: stopped famotidine given renal dysfunction. MARLO Duplex and RP US unremarkable, CK wnl.    ENDO: Hx hypothyroid: IV Synthroid, TSH low on 11/30, decreased synthroid dose, repeat TSH 12/6 wnl.  ID: currently not on any abx/antifungals. BCx 11/22 grew candida glabrata, likely CLABSI. s/p Caspo & completed Fluconazole course (12/1-12/9). Ophthalmology evaluated - normal ocular exam. Echo no vegetation. PICC replaced on 12/4. Cont Nystatin powder. // DC: caspofungin (11/24-12/1). empiric 7days cefepime (11/15-11/22), C. tertium, Lactobacillis from IR cx 7/3, and candida albicans, lactobacillus, vanc sensitive E faecium, vanc resistant E gallinarum, vanc resistant E casseliflavus, lactobacillus from OR cx 7/5. Completed course of abx with Imipenem (9/10-9/15). Imipenem (8/26--) imipenem (6/30-7/12, 7/23-7/30), Dapto (6/30-7/5 and 7/23-7/24). Empiric dapto (8/23-24) and cefepime (8/23-24).   PPX: SCDs, SQH ppx was on Therapeutic lovenox bid for R brachial vein DVT, but will hold off on anticoagulation since repeat US Duplex negative.  HEME: Anemia - continue Epogen weekly. S/p Iron Sucrose 200 qd x 3 days for chronic anemia.   LINES: PIVs, LUE PICC (12/4 - ) will plan to switch PICC once a month (around 1/4) // DC: LUE PICC (9/1-11/1 ), RUE PICC: (11/1-11/24)   WOUNDS/DRAINS: Abdominal wound and fistula with wound manager in place dressing change Tuesday and Friday, had punctate bleeding at wound bed, s/p placed surgicell over - not bleeding at this time. RLQ Ostomy. IR perc deb tube. // DC: L Clay drain.  PT: Ambulate as tolerated OOB to chair daily.  DISPO: SICU due to complicated hospital course.

## 2024-01-01 NOTE — PROGRESS NOTE ADULT - SUBJECTIVE AND OBJECTIVE BOX
STATUS POST:  Exploratory laparotomy    Laparoscopic lysis of intestinal adhesions    Exploratory laparotomy    Open lysis of intestinal adhesions    Resection of small bowel    Temporary closure of abdominal cavity    Repair, anastomosis, ileocolic    Small bowel resection with anastomosis    Flap, myocutaneous, abdominal wall    Reconstruction of abdominal wall using mesh    Washout of abdominal cavity    Creation of ileostomy    Creation of colostomy    Laparoscopic lysis of intestinal adhesions    Open lysis of intestinal adhesions    Resection of small bowel    Temporary closure of abdominal cavity    Repair, anastomosis, ileocolic    Small bowel resection with anastomosis    Flap, myocutaneous, abdominal wall    Reconstruction of abdominal wall using mesh    Washout of abdominal cavity        POST OPERATIVE DAY #:     SUBJECTIVE:     Vital Signs Last 24 Hrs  T(C): 36.7 (01 Jan 2024 04:58), Max: 36.7 (01 Jan 2024 04:58)  T(F): 98.1 (01 Jan 2024 04:58), Max: 98.1 (01 Jan 2024 04:58)  HR: 103 (01 Jan 2024 05:53) (100 - 103)  BP: 119/61 (01 Jan 2024 05:53) (99/54 - 148/70)  BP(mean): 84 (01 Jan 2024 05:53) (71 - 98)  RR: 23 (01 Jan 2024 05:53) (21 - 28)  SpO2: 100% (01 Jan 2024 05:53) (94% - 100%)    Parameters below as of 01 Jan 2024 05:53  Patient On (Oxygen Delivery Method): room air        I&O's Summary    31 Dec 2023 07:01  -  01 Jan 2024 07:00  --------------------------------------------------------  IN: 6073.4 mL / OUT: 4600 mL / NET: 1473.4 mL    01 Jan 2024 07:01  -  01 Jan 2024 08:44  --------------------------------------------------------  IN: 388 mL / OUT: 0 mL / NET: 388 mL        Physical Exam:  General Appearance: Appears well, NAD  Pulmonary: Nonlabored breathing, no respiratory distress  Cardiovascular: NSR  Abdomen: Soft, nondisteded, appropriate incisional tenderness, dressings clean and dry and intact  Extremities: WWP, SCD's in place     LABS:                        8.5    7.02  )-----------( 177      ( 01 Jan 2024 05:30 )             27.8     01-01    134<L>  |  101  |  48<H>  ----------------------------<  138<H>  4.2   |  26  |  1.61<H>    Ca    8.4      01 Jan 2024 05:30  Phos  3.3     01-01  Mg     2.2     01-01    TPro  8.3  /  Alb  2.3<L>  /  TBili  6.2<H>  /  DBili  4.2<H>  /  AST  96<H>  /  ALT  68<H>  /  AlkPhos  123<H>  01-01    PT/INR - ( 01 Jan 2024 05:30 )   PT: 14.6 sec;   INR: 1.29            Urinalysis Basic - ( 01 Jan 2024 05:30 )    Color: x / Appearance: x / SG: x / pH: x  Gluc: 138 mg/dL / Ketone: x  / Bili: x / Urobili: x   Blood: x / Protein: x / Nitrite: x   Leuk Esterase: x / RBC: x / WBC x   Sq Epi: x / Non Sq Epi: x / Bacteria: x       STATUS POST:  Exploratory laparotomy    Laparoscopic lysis of intestinal adhesions    Exploratory laparotomy    Open lysis of intestinal adhesions    Resection of small bowel    Temporary closure of abdominal cavity    Repair, anastomosis, ileocolic    Small bowel resection with anastomosis    Flap, myocutaneous, abdominal wall    Reconstruction of abdominal wall using mesh    Washout of abdominal cavity    Creation of ileostomy    Creation of colostomy    Laparoscopic lysis of intestinal adhesions    Open lysis of intestinal adhesions    Resection of small bowel    Temporary closure of abdominal cavity    Repair, anastomosis, ileocolic    Small bowel resection with anastomosis    Flap, myocutaneous, abdominal wall    Reconstruction of abdominal wall using mesh    Washout of abdominal cavity      SUBJECTIVE: Pt seen and examined at the bedside by surgical team. No acute complaints at this time, reports being OOBA well.     Vital Signs Last 24 Hrs  T(C): 36.7 (01 Jan 2024 04:58), Max: 36.7 (01 Jan 2024 04:58)  T(F): 98.1 (01 Jan 2024 04:58), Max: 98.1 (01 Jan 2024 04:58)  HR: 103 (01 Jan 2024 05:53) (100 - 103)  BP: 119/61 (01 Jan 2024 05:53) (99/54 - 148/70)  BP(mean): 84 (01 Jan 2024 05:53) (71 - 98)  RR: 23 (01 Jan 2024 05:53) (21 - 28)  SpO2: 100% (01 Jan 2024 05:53) (94% - 100%)    Parameters below as of 01 Jan 2024 05:53  Patient On (Oxygen Delivery Method): room air        I&O's Summary    31 Dec 2023 07:01  -  01 Jan 2024 07:00  --------------------------------------------------------  IN: 6073.4 mL / OUT: 4600 mL / NET: 1473.4 mL    01 Jan 2024 07:01  -  01 Jan 2024 08:44  --------------------------------------------------------  IN: 388 mL / OUT: 0 mL / NET: 388 mL      Physical Exam:  General: NAD, resting comfortably in bed  Pulmonary: Nonlabored breathing, no respiratory distress  Cardiovascular: NSR  Abdominal: soft, NT/ND  Extremities: WWP  Neuro: A/O x 3, CNs II-XII grossly intact, no focal deficits      LABS:                        8.5    7.02  )-----------( 177      ( 01 Jan 2024 05:30 )             27.8     01-01    134<L>  |  101  |  48<H>  ----------------------------<  138<H>  4.2   |  26  |  1.61<H>    Ca    8.4      01 Jan 2024 05:30  Phos  3.3     01-01  Mg     2.2     01-01    TPro  8.3  /  Alb  2.3<L>  /  TBili  6.2<H>  /  DBili  4.2<H>  /  AST  96<H>  /  ALT  68<H>  /  AlkPhos  123<H>  01-01    PT/INR - ( 01 Jan 2024 05:30 )   PT: 14.6 sec;   INR: 1.29            Urinalysis Basic - ( 01 Jan 2024 05:30 )    Color: x / Appearance: x / SG: x / pH: x  Gluc: 138 mg/dL / Ketone: x  / Bili: x / Urobili: x   Blood: x / Protein: x / Nitrite: x   Leuk Esterase: x / RBC: x / WBC x   Sq Epi: x / Non Sq Epi: x / Bacteria: x

## 2024-01-01 NOTE — PROGRESS NOTE ADULT - ASSESSMENT
A/P  Stable overall.  Sprits are good.  Outputs stable  Leakage and bleeding are the main concerns regarding the fistula.  Leakage yesterday controlled with some patching of present wound manager and no bleeding noted.  In chair and will ambulate later  Considering restarting of minidose heparin.  If it is restarted would limit administration to bid dosing  Ambulation with careful transfers to avoid detaching wound manager and leakage  Continue TPN  WOCN care and assistance greatly appreciated.

## 2024-01-01 NOTE — PROGRESS NOTE ADULT - SUBJECTIVE AND OBJECTIVE BOX
Colon and Rectal Surgery  (covering for Dr. Peterson)  5 ICU  TPN    Awake and alert sitting in chair this AM.  No c/o this AM.  Continue on solid diet that is being well tolerated.  Ambulating daily    Vital Signs Last 24 Hrs  T(C): 36.8 (01 Jan 2024 08:00), Max: 36.8 (01 Jan 2024 08:00)  T(F): 98.2 (01 Jan 2024 08:00), Max: 98.2 (01 Jan 2024 08:00)  HR: 106 (01 Jan 2024 09:00) (100 - 106)  BP: 115/57 (01 Jan 2024 09:00) (99/54 - 148/70)  BP(mean): 80 (01 Jan 2024 09:00) (71 - 98)  RR: 22 (01 Jan 2024 09:00) (18 - 28)  SpO2: 97% (01 Jan 2024 09:00) (94% - 100%)    Parameters below as of 01 Jan 2024 09:00  Patient On (Oxygen Delivery Method): room air    abd: no change. wound manager in place with output, cony drain and ileostomy red rubber in place, non tender, not distended  ext: without calf tenderness    UO: 950 ml/12 hr,  1,850 ml/24 hr  fistula: 375 ml/12 hr, 1,400/24 hr  ocny drain: 650/12 hr, 1,300 ml/24 hr  ileostomy:  50 ml/24 hr    TPN   2,320 ml/24 hrs  p/o intake fluid wise: 2,250 ml/24 hrs                          8.5    7.02  )-----------( 177      ( 01 Jan 2024 05:30 )             27.8   01-01    134<L>  |  101  |  48<H>  ----------------------------<  138<H>  4.2   |  26  |  1.61<H>    Ca    8.4      01 Jan 2024 05:30  Phos  3.3     01-01  Mg     2.2     01-01    TPro  8.3  /  Alb  2.3<L>  /  TBili  6.2<H>  /  DBili  4.2<H>  /  AST  96<H>  /  ALT  68<H>  /  AlkPhos  123<H>  01-01   Colon and Rectal Surgery  (covering for Dr. Peterson)  5 ICU  TPN    Awake and alert sitting in chair this AM.  No c/o this AM.  Continue on solid diet that is being well tolerated.  Ambulating daily    Vital Signs Last 24 Hrs  T(C): 36.8 (01 Jan 2024 08:00), Max: 36.8 (01 Jan 2024 08:00)  T(F): 98.2 (01 Jan 2024 08:00), Max: 98.2 (01 Jan 2024 08:00)  HR: 106 (01 Jan 2024 09:00) (100 - 106)  BP: 115/57 (01 Jan 2024 09:00) (99/54 - 148/70)  BP(mean): 80 (01 Jan 2024 09:00) (71 - 98)  RR: 22 (01 Jan 2024 09:00) (18 - 28)  SpO2: 97% (01 Jan 2024 09:00) (94% - 100%)    Parameters below as of 01 Jan 2024 09:00  Patient On (Oxygen Delivery Method): room air    abd: no change. wound manager in place with output, cony drain and ileostomy red rubber in place, non tender, not distended  ext: without calf tenderness    UO: 950 ml/12 hr,  1,850 ml/24 hr  fistula: 375 ml/12 hr, 1,400/24 hr  cony drain: 650/12 hr, 1,300 ml/24 hr  ileostomy:  50 ml/24 hr    TPN   2,320 ml/24 hrs  p/o intake fluid wise: 2,250 ml/24 hrs                          8.5    7.02  )-----------( 177      ( 01 Jan 2024 05:30 )             27.8   01-01    134<L>  |  101  |  48<H>  ----------------------------<  138<H>  4.2   |  26  |  1.61<H>    Ca    8.4      01 Jan 2024 05:30  Phos  3.3     01-01  Mg     2.2     01-01    TPro  8.3  /  Alb  2.3<L>  /  TBili  6.2<H>  /  DBili  4.2<H>  /  AST  96<H>  /  ALT  68<H>  /  AlkPhos  123<H>  01-01

## 2024-01-01 NOTE — PROGRESS NOTE ADULT - ASSESSMENT
78M w PMH Crohn's, AFib/Flutter s/p DCCVs on amiodarone, remote ileocectomy and open appendectomy. Admitted (6/23) for SBO vs Crohns flare, s/p NGT decompression and s/p lap converted to open TRE, SBR x 3, left in discontinuity with abthera vac on (6/27), RTOR for ileocolic resection, small bowel anastomosis, and abdominal wall closure on (6/28), c/b fluid collection s/p IR aspiration of perihepatic fluid on (7/3), c/b wound dehiscence s/p RTOR exlap, washout, ileocolic resection with end ileostomy, blow hole colostomy, red rubber from ileostomy to small bowel anastomosis; vicryl bridging mesh on (7/5) transferred to SICU postoperatively for hemodynamic monitoring, with hospital course complicated by periods of severe ELVI and hyponatremia, which resolved but stepped back up for to SICU on (9/10) for acute AMS, intermittent hypoglycemia, AFib with RVR. Percutaneous Cholecystostomy placed on (9/11) for enlarged GB, PCT capped 10/5 and uncapped 10/25 for hyperbilirubinemia, Right brachial DVT, left basillic and cephalic superficial thrombus on duplex 11/2, CT 11/14 with ALDEN ground glass opacity of unclear etiology, completed empiric 7day cefepime course 11/22, rising T-bili of unclear etilogy, now w resolved candida glabrata fungemia, ELVI resolving.    Continue care per SICU team

## 2024-01-02 NOTE — PROGRESS NOTE ADULT - ASSESSMENT
A/p:78M w PMH Crohn's, AFib/Flutter s/p DCCVs on amiodarone, remote ileocectomy and open appendectomy. Admitted (6/23) for SBO vs Crohns flare, s/p NGT decompression and s/p lap converted to open TRE, SBR x 3, left in discontinuity with abthera vac on (6/27), RTOR for ileocolic resection, small bowel anastomosis, and abdominal wall closure on (6/28), c/b fluid collection s/p IR aspiration of perihepatic fluid on (7/3), c/b wound dehiscence s/p RTOR exlap, washout, ileocolic resection with end ileostomy, blow hole colostomy, red rubber from ileostomy to small bowel anastomosis; vicryl bridging mesh on (7/5) transferred to SICU postoperatively for hemodynamic monitoring, with hospital course complicated by periods of severe ELVI and hyponatremia, which resolved but stepped back up for to SICU on (9/10) for acute AMS, intermittent hypoglycemia, AFib with RVR. Percutaneous Cholecystostomy placed on (9/11) for enlarged GB, PCT capped 10/5 and uncapped 10/25 for hyperbilirubinemia, Right brachial DVT, left basillic and cephalic superficial thrombus on duplex 11/2, CT 11/14 with ALDEN ground glass opacity of unclear etiology, completed empiric 7day cefepime course 11/22, rising T-bili of unclear etilogy, now w resolved candida glabrata fungemia, ELVI improving.     NEURO: Hx of depression: cont Effexor (halved dose in setting of renal dysfunx). Anxiety: Lorazepam PRN. Holistic consult for anxiety and insomnia.   CV: Hx Afib/flutter: s/p DCCV, off Amio and lipitor due to persistent transaminitis, continue labetalol IV 10q6h. TTE  (7/18) - PASP 64mmHg, EF 65-70%. holding Losartan & norvasc because not absorbing PO meds, hydralazine PRN.   PULM: Atelectasis/dyspnea improved clinically: cont 1 hr of BiPAP every 12hrs of nasal canula or room air. Encourage OOB and IS.  CT from 11/14 with ALDEN ground glass opacity of unclear etiology. COVID negative. RVP negative. completed 7d empiric cefepime 11/22 to cover for potential PNA.   GI/FEN: Low res, low fat, high protein diet. Cont Ensure max x1/day. Folate, thiamine. PICC replaced 12/4, will switch out PICC on 1/4. continue TPN/lipids. High output EC fistula: cont Octreotide & Cholestyramine 10/18. Transaminitis elevated T bili of unclear origin, s/p Perc Deb on 9/11, failed capping trial; seen by Hepatology - MRCP 10/30 no obstruction, cont ursodiol, RUQ US 11/25 CBD 5mm, hepatic vessels patent  : Voids. ELVI improved: stopped famotidine given renal dysfunction. MARLO Duplex and RP US unremarkable, CK wnl.    ENDO: Hx hypothyroid: IV Synthroid, TSH low on 11/30, decreased synthroid dose, repeat TSH 12/6 wnl.  ID: currently not on any abx/antifungals. BCx 11/22 grew candida glabrata, likely CLABSI. s/p Caspo & completed Fluconazole course (12/1-12/9). Ophthalmology evaluated - normal ocular exam. Echo no vegetation. PICC replaced on 12/4. Cont Nystatin powder. // DC: caspofungin (11/24-12/1). empiric 7days cefepime (11/15-11/22), C. tertium, Lactobacillis from IR cx 7/3, and candida albicans, lactobacillus, vanc sensitive E faecium, vanc resistant E gallinarum, vanc resistant E casseliflavus, lactobacillus from OR cx 7/5. Completed course of abx with Imipenem (9/10-9/15). Imipenem (8/26--) imipenem (6/30-7/12, 7/23-7/30), Dapto (6/30-7/5 and 7/23-7/24). Empiric dapto (8/23-24) and cefepime (8/23-24).   PPX: SCDs, SQH ppx was on Therapeutic lovenox bid for R brachial vein DVT, but will hold off on anticoagulation since repeat US Duplex negative.  HEME: Anemia - continue Epogen weekly. S/p Iron Sucrose 200 qd x 3 days for chronic anemia.   LINES: PIVs, MERONE PICC (12/4 - ) will plan to switch PICC once a month (around 1/4) // DC: LUE PICC (9/1-11/1 ), RUE PICC: (11/1-11/24)   WOUNDS/DRAINS: Abdominal wound and fistula with wound manager in place dressing change Tuesday and Friday, had punctate bleeding at wound bed, s/p placed surgicell over - not bleeding at this time. RLQ Ostomy. IR perc deb tube. // DC: L Clay drain.  PT: Ambulate as tolerated OOB to chair daily.  DISPO: SICU due to complicated hospital course.    A/p:78M w PMH Crohn's, AFib/Flutter s/p DCCVs on amiodarone, remote ileocectomy and open appendectomy. Admitted (6/23) for SBO vs Crohns flare, s/p NGT decompression and s/p lap converted to open TRE, SBR x 3, left in discontinuity with abthera vac on (6/27), RTOR for ileocolic resection, small bowel anastomosis, and abdominal wall closure on (6/28), c/b fluid collection s/p IR aspiration of perihepatic fluid on (7/3), c/b wound dehiscence s/p RTOR exlap, washout, ileocolic resection with end ileostomy, blow hole colostomy, red rubber from ileostomy to small bowel anastomosis; vicryl bridging mesh on (7/5) transferred to SICU postoperatively for hemodynamic monitoring, with hospital course complicated by periods of severe ELVI and hyponatremia, which resolved but stepped back up for to SICU on (9/10) for acute AMS, intermittent hypoglycemia, AFib with RVR. Percutaneous Cholecystostomy placed on (9/11) for enlarged GB, PCT capped 10/5 and uncapped 10/25 for hyperbilirubinemia, Right brachial DVT, left basillic and cephalic superficial thrombus on duplex 11/2, CT 11/14 with ALDEN ground glass opacity of unclear etiology, completed empiric 7day cefepime course 11/22, rising T-bili of unclear etilogy, now w resolved candida glabrata fungemia, ELVI improving.     NEURO: Hx of depression: cont Effexor (halved dose in setting of renal dysfunx). Anxiety: Lorazepam PRN. Holistic consult for anxiety and insomnia. Gabapentin for restless leg syndrome.   CV: Intermittently tachycardic since 12/30. EKG sinus tachycardia (1/2/24). Will obtain thyroid studies tomorrow. Patient currently euvolemic and afebrile. Hx Afib/flutter: s/p DCCV, off Amio and lipitor due to persistent transaminitis, continue labetalol IV 10q6h. TTE  (7/18) - PASP 64mmHg, EF 65-70%. holding Losartan & norvasc because not absorbing PO meds, hydralazine PRN.   PULM: Atelectasis/dyspnea improved clinically: cont 1 hr of BiPAP every 12hrs of nasal canula or room air. Encourage OOB and IS.  CT from 11/14 with ALDEN ground glass opacity of unclear etiology. COVID negative. RVP negative. completed 7d empiric cefepime 11/22 to cover for potential PNA.   GI/FEN: Low res, low fat, high protein diet. Cont Ensure max x1/day. Folate, thiamine. PICC replaced 12/4, will switch out PICC on 1/4. Continue TPN/lipids, reordered 1/2/24. High output EC fistula: cont Octreotide & Cholestyramine 10/18. Transaminitis elevated T bili of unclear origin, s/p Perc Deb on 9/11, failed capping trial. Will repeat capping trial today (1/2); seen by Hepatology - MRCP 10/30 no obstruction, cont ursodiol, RUQ US 11/25 CBD 5mm, hepatic vessels patent  : Voids. ELVI improved: stopped famotidine given renal dysfunction. MARLO Duplex and RP US unremarkable, CK wnl.    ENDO: Hx hypothyroid: IV Synthroid, TSH low on 11/30, decreased synthroid dose, repeat TSH 12/6 wnl. Will obtain repeat thyroid studies on 1/3/24.   ID: currently not on any abx/antifungals. BCx 11/22 grew candida glabrata, likely CLABSI. s/p Caspo & completed Fluconazole course (12/1-12/9). Ophthalmology evaluated - normal ocular exam. Echo no vegetation. PICC replaced on 12/4. Cont Nystatin powder. // DC: caspofungin (11/24-12/1). empiric 7days cefepime (11/15-11/22), C. tertium, Lactobacillis from IR cx 7/3, and candida albicans, lactobacillus, vanc sensitive E faecium, vanc resistant E gallinarum, vanc resistant E casseliflavus, lactobacillus from OR cx 7/5. Completed course of abx with Imipenem (9/10-9/15). Imipenem (8/26--) imipenem (6/30-7/12, 7/23-7/30), Dapto (6/30-7/5 and 7/23-7/24). Empiric dapto (8/23-24) and cefepime (8/23-24).   PPX: SCDs, SQH ppx was on Therapeutic lovenox bid for R brachial vein DVT, but will hold off on anticoagulation since repeat US Duplex negative.  HEME: Anemia - continue Epogen weekly. S/p Iron Sucrose 200 qd x 3 days for chronic anemia.   LINES: Blair, ZAHEER PICC (12/4 - ) will plan to switch PICC once a month (around 1/4) // DC: LUE PICC (9/1-11/1 ), RUE PICC: (11/1-11/24)   WOUNDS/DRAINS: Abdominal wound and fistula with wound manager in place dressing change Tuesday and Friday, had punctate bleeding at wound bed, s/p placed surgicel, attempted to stitch, electrocaudery. Bleeding stopped after applying epi soaked gauze - not bleeding at this time. RLQ Ostomy. IR perc deb tube. // DC: L Clay drain.  PT: Ambulate as tolerated OOB to chair daily.  DISPO: SICU due to complicated hospital course.

## 2024-01-02 NOTE — ADVANCED PRACTICE NURSE CONSULT - ASSESSMENT
Right side of abdomen small opening draining small amount of yellow effluent. WOCN cleansed surrounding skin with cleansing wipe, applied skin prep, then a one piece ostomy pouching system.    Stomatized fistula functioning with thick yellow effluent. Perifistula skin friable and denuded. WOCN cleansed denuded skin with normal saline, applied stoma powder, sealed with skin prep, applied ostomy ring around the fistula, the applied an Dustin wound manager.  Right side of abdomen small opening draining small amount of yellow effluent. WOCN cleansed surrounding skin with cleansing wipe, applied skin prep, then an ostomy ring and one piece ostomy pouching system.    Stomatized fistula functioning with thick yellow effluent. Perifistula skin friable and denuded. WOCN cleansed denuded skin with normal saline, applied stoma powder, sealed with skin prep, applied ostomy ring around the fistula, the applied an Dustin wound manager.  Right side of abdomen small opening draining small amount of yellow effluent. WOCN cleansed surrounding skin with cleansing wipe, applied skin prep, then an ostomy ring and one piece ostomy pouching system.    Stomatized fistula functioning with thick yellow effluent. Perifistula skin friable and denuded. WOCN cleansed denuded skin with normal saline, applied stoma powder, sealed with skin prep, applied ostomy ring around the fistula, then applied an Dustin wound manager.  Ileostomy draining small amount of yellow effluent. WOCN cleansed surrounding skin with cleansing wipe, applied skin prep, then an ostomy ring and one piece ostomy pouching system.    Stomatized fistula functioning with thick yellow effluent. Perifistula skin friable and denuded. WOCN cleansed denuded skin with normal saline, applied stoma powder, sealed with skin prep, applied ostomy ring around the fistula, then applied an Dustin wound manager.

## 2024-01-02 NOTE — PROGRESS NOTE ADULT - ASSESSMENT
78M w PMH Crohn's, AFib/Flutter s/p DCCVs on amiodarone, remote ileocectomy and open appendectomy. Admitted (6/23) for SBO vs Crohns flare, s/p NGT decompression and s/p lap converted to open TRE, SBR x 3, left in discontinuity with abthera vac on (6/27), RTOR for ileocolic resection, small bowel anastomosis, and abdominal wall closure on (6/28), c/b fluid collection s/p IR aspiration of perihepatic fluid on (7/3), c/b wound dehiscence s/p RTOR exlap, washout, ileocolic resection with end ileostomy, blow hole colostomy, red rubber from ileostomy to small bowel anastomosis; vicryl bridging mesh on (7/5) transferred to SICU postoperatively for hemodynamic monitoring, with hospital course complicated by periods of severe ELVI and hyponatremia, which resolved but stepped back up for to SICU on (9/10) for acute AMS, intermittent hypoglycemia, AFib with RVR. Percutaneous Cholecystostomy placed on (9/11) for enlarged GB, PCT capped 10/5 and uncapped 10/25 for hyperbilirubinemia, Right brachial DVT, left basillic and cephalic superficial thrombus on duplex 11/2, CT 11/14 with ALDEN ground glass opacity of unclear etiology, completed empiric 7day cefepime course 11/22, rising T-bili of unclear etilogy, now w resolved candida glabrata fungemia, ELVI improving.     Plan   - Continue TPN   - Continue wound management as per wound care   - Plan discussed with Dr Peterson   - Team 5 will continue to follow

## 2024-01-02 NOTE — PROGRESS NOTE ADULT - ASSESSMENT
The patient is a 78 year old male with a PMHx of Crohn's, AFib/Flutter s/p DCCVs on amiodarone, remote ileocectomy and open appendectomy. Admitted (6/23) for SBO vs Crohns flare, s/p NGT decompression and s/p lap converted to open TRE, SBR x 3, left in discontinuity with abthera vac on (6/27), RTOR for ileocolic resection, small bowel anastomosis, and abdominal wall closure on (6/28), c/b fluid collection s/p IR aspiration of perihepatic fluid on (7/3), c/b wound dehiscence s/p RTOR exlap, washout, ileocolic resection with end ileostomy, blow hole colostomy, red rubber from ileostomy to small bowel anastomosis; vicryl bridging mesh on (7/5) transferred to SICU postoperatively for hemodynamic monitoring, with hospital course complicated by periods of severe ELVI and hyponatremia, which resolved but stepped back up for to SICU on (9/10) for acute AMS, intermittent hypoglycemia, AFib with RVR. Percutaneous Cholecystostomy placed on (9/11) for enlarged GB, PCT capped 10/5 and uncapped 10/25 for hyperbilirubinemia, Right brachial DVT, left basillic and cephalic superficial thrombus on duplex 11/2, CT 11/14 with ALDEN ground glass opacity of unclear etiology, completed empiric 7day cefepime course 11/22, rising T-bili of unclear etiology now w resolved candida glabrata fungemia, ELVI improving.     Reg Diet  Pain/nausea control prn  SQH bid, SCDs/OOBA/IS  BiPAP q12h prn for atelectasis  Wound manager changes Tues/Fri  C/W octreotide, cholestyramine  C/W Epogen weekly  Rest of care per SICU

## 2024-01-02 NOTE — PROGRESS NOTE ADULT - SUBJECTIVE AND OBJECTIVE BOX
S: Overnight, Eakins pouch leaking and was replaced. Patient seen at bedside this morning. Denies chest pain, abdominal pain. No nausea/vomiting.     O: ICU Vital Signs Last 24 Hrs  T(F): 98.1 (01-02-24 @ 06:01), Max: 98.1 (01-02-24 @ 06:01)  HR: 104 (01-02-24 @ 05:16) (101 - 106)  BP: 139/67 (01-02-24 @ 05:00) (115/57 - 145/60)  BP(mean): 96 (01-02-24 @ 05:00) (80 - 96)  ABP: --  RR: 30 (01-02-24 @ 05:00) (18 - 30)  SpO2: 95% (01-02-24 @ 05:16) (93% - 100%)    PHYSICAL EXAM:   General: NAD   Neuro: AOx3   ENT: mucus membrane moist  Cardiovascular: sinus tachycardia, no murmurs   Respiratory: CTAB  Gastrointestinal: soft, nontender and nondistended. No guarding or rebound. ECF pouch wound manager in place. PCT bilious.   Extremities: warm, no dependent edema  Vascular: no cyanosis/erythema  Skin: no rashes  MSK: no joint swelling.     LABS:    01-01    134<L>  |  101  |  48<H>  ----------------------------<  138<H>  4.2   |  26  |  1.61<H>    Ca    8.4      01 Jan 2024 05:30  Phos  3.3     01-01  Mg     2.2     01-01    TPro  8.3  /  Alb  2.3<L>  /  TBili  6.2<H>  /  DBili  4.2<H>  /  AST  96<H>  /  ALT  68<H>  /  AlkPhos  123<H>  01-01  LIVER FUNCTIONS - ( 01 Jan 2024 05:30 )  Alb: 2.3 g/dL / Pro: 8.3 g/dL / ALK PHOS: 123 U/L / ALT: 68 U/L / AST: 96 U/L / GGT: x                               8.5    7.02  )-----------( 177      ( 01 Jan 2024 05:30 )             27.8   PT/INR - ( 01 Jan 2024 05:30 )   PT: 14.6 sec;   INR: 1.29            Urinalysis Basic - ( 01 Jan 2024 05:30 )    Color: x / Appearance: x / SG: x / pH: x  Gluc: 138 mg/dL / Ketone: x  / Bili: x / Urobili: x   Blood: x / Protein: x / Nitrite: x   Leuk Esterase: x / RBC: x / WBC x   Sq Epi: x / Non Sq Epi: x / Bacteria: x    CAPILLARY BLOOD GLUCOSE        MEDICATIONS  (STANDING):  chlorhexidine 2% Cloths 1 Application(s) Topical <User Schedule>  cholestyramine Powder (Sugar-Free) 4 Gram(s) Oral daily  epoetin matt-epbx (RETACRIT) Injectable 13992 Unit(s) SubCutaneous every 7 days  ergocalciferol 35705 Unit(s) Oral <User Schedule>  gabapentin 100 milliGRAM(s) Oral at bedtime  glucagon  Injectable 1 milliGRAM(s) IntraMuscular once  heparin   Injectable 5000 Unit(s) SubCutaneous every 12 hours  labetalol Injectable 10 milliGRAM(s) IV Push every 6 hours  levothyroxine Injectable 30 MICROGram(s) IV Push at bedtime  lipid, fat emulsion (Fish Oil and Plant Based) 20% Infusion 0.54 Gm/kG/Day (20.83 mL/Hr) IV Continuous <Continuous>  lipid, fat emulsion (Fish Oil and Plant Based) 20% Infusion 0.54 Gm/kG/Day (20.83 mL/Hr) IV Continuous <Continuous>  Parenteral Nutrition - Adult 1 Each TPN Continuous <Continuous>  Parenteral Nutrition - Adult 1 Each TPN Continuous <Continuous>  ursodiol Suspension 300 milliGRAM(s) Oral every 8 hours  venlafaxine XR. 150 milliGRAM(s) Oral daily    MEDICATIONS  (PRN):  chlorhexidine 0.12% Liquid 15 milliLiter(s) Swish and Spit two times a day PRN oral hygeine  hydrALAZINE Injectable 10 milliGRAM(s) IV Push every 6 hours PRN SBP >170  lidocaine 2% Viscous 10 milliLiter(s) Swish and Spit every 12 hours PRN tongue pain  LORazepam   Injectable 1 milliGRAM(s) IV Push <User Schedule> PRN Anxiety  ondansetron Injectable 4 milliGRAM(s) IV Push every 6 hours PRN Nausea and/or Vomiting      Poole:	  [ ] None	[ ] Daily Poole Order Placed	   Indication:	  [ ] Strict I and O's    [ ] Obstruction     [ ] Incontinence + Stage 3 or 4 Decubitus  Central Line:  [ ] None	   [ ]  Medication / TPN Administration     [ ] No Peripheral IV        S: Overnight, Eakins pouch leaking and was replaced. Patient seen at bedside this morning. Denies chest pain, abdominal pain. No nausea/vomiting.     O: ICU Vital Signs Last 24 Hrs  T(F): 98.1 (01-02-24 @ 06:01), Max: 98.1 (01-02-24 @ 06:01)  HR: 104 (01-02-24 @ 05:16) (101 - 106)  BP: 139/67 (01-02-24 @ 05:00) (115/57 - 145/60)  BP(mean): 96 (01-02-24 @ 05:00) (80 - 96)  ABP: --  RR: 30 (01-02-24 @ 05:00) (18 - 30)  SpO2: 95% (01-02-24 @ 05:16) (93% - 100%)    PHYSICAL EXAM:   General: NAD   Neuro: AOx3   ENT: mucus membrane moist  Cardiovascular: sinus tachycardia, no murmurs   Respiratory: CTAB  Gastrointestinal: soft, nontender and nondistended. No guarding or rebound. ECF pouch wound manager in place. PCT bilious.   Extremities: warm, no dependent edema  Vascular: no cyanosis/erythema  Skin: no rashes  MSK: no joint swelling.     LABS:    01-01    134<L>  |  101  |  48<H>  ----------------------------<  138<H>  4.2   |  26  |  1.61<H>    Ca    8.4      01 Jan 2024 05:30  Phos  3.3     01-01  Mg     2.2     01-01    TPro  8.3  /  Alb  2.3<L>  /  TBili  6.2<H>  /  DBili  4.2<H>  /  AST  96<H>  /  ALT  68<H>  /  AlkPhos  123<H>  01-01  LIVER FUNCTIONS - ( 01 Jan 2024 05:30 )  Alb: 2.3 g/dL / Pro: 8.3 g/dL / ALK PHOS: 123 U/L / ALT: 68 U/L / AST: 96 U/L / GGT: x                               8.5    7.02  )-----------( 177      ( 01 Jan 2024 05:30 )             27.8   PT/INR - ( 01 Jan 2024 05:30 )   PT: 14.6 sec;   INR: 1.29            Urinalysis Basic - ( 01 Jan 2024 05:30 )    Color: x / Appearance: x / SG: x / pH: x  Gluc: 138 mg/dL / Ketone: x  / Bili: x / Urobili: x   Blood: x / Protein: x / Nitrite: x   Leuk Esterase: x / RBC: x / WBC x   Sq Epi: x / Non Sq Epi: x / Bacteria: x    CAPILLARY BLOOD GLUCOSE        MEDICATIONS  (STANDING):  chlorhexidine 2% Cloths 1 Application(s) Topical <User Schedule>  cholestyramine Powder (Sugar-Free) 4 Gram(s) Oral daily  epoetin matt-epbx (RETACRIT) Injectable 44628 Unit(s) SubCutaneous every 7 days  ergocalciferol 86542 Unit(s) Oral <User Schedule>  gabapentin 100 milliGRAM(s) Oral at bedtime  glucagon  Injectable 1 milliGRAM(s) IntraMuscular once  heparin   Injectable 5000 Unit(s) SubCutaneous every 12 hours  labetalol Injectable 10 milliGRAM(s) IV Push every 6 hours  levothyroxine Injectable 30 MICROGram(s) IV Push at bedtime  lipid, fat emulsion (Fish Oil and Plant Based) 20% Infusion 0.54 Gm/kG/Day (20.83 mL/Hr) IV Continuous <Continuous>  lipid, fat emulsion (Fish Oil and Plant Based) 20% Infusion 0.54 Gm/kG/Day (20.83 mL/Hr) IV Continuous <Continuous>  Parenteral Nutrition - Adult 1 Each TPN Continuous <Continuous>  Parenteral Nutrition - Adult 1 Each TPN Continuous <Continuous>  ursodiol Suspension 300 milliGRAM(s) Oral every 8 hours  venlafaxine XR. 150 milliGRAM(s) Oral daily    MEDICATIONS  (PRN):  chlorhexidine 0.12% Liquid 15 milliLiter(s) Swish and Spit two times a day PRN oral hygeine  hydrALAZINE Injectable 10 milliGRAM(s) IV Push every 6 hours PRN SBP >170  lidocaine 2% Viscous 10 milliLiter(s) Swish and Spit every 12 hours PRN tongue pain  LORazepam   Injectable 1 milliGRAM(s) IV Push <User Schedule> PRN Anxiety  ondansetron Injectable 4 milliGRAM(s) IV Push every 6 hours PRN Nausea and/or Vomiting      Poole:	  [ ] None	[ ] Daily Poole Order Placed	   Indication:	  [ ] Strict I and O's    [ ] Obstruction     [ ] Incontinence + Stage 3 or 4 Decubitus  Central Line:  [ ] None	   [ ]  Medication / TPN Administration     [ ] No Peripheral IV        S: Overnight, Eakins pouch leaking and was replaced. Missed output x 1. Patient seen at bedside this morning. Denies chest pain, abdominal pain. No nausea/vomiting.     O: ICU Vital Signs Last 24 Hrs  T(F): 98.1 (01-02-24 @ 06:01), Max: 98.1 (01-02-24 @ 06:01)  HR: 104 (01-02-24 @ 05:16) (101 - 106)  BP: 139/67 (01-02-24 @ 05:00) (115/57 - 145/60)  BP(mean): 96 (01-02-24 @ 05:00) (80 - 96)  ABP: --  RR: 30 (01-02-24 @ 05:00) (18 - 30)  SpO2: 95% (01-02-24 @ 05:16) (93% - 100%)    PHYSICAL EXAM:   General: NAD   Neuro: AOx3   ENT: mucus membrane moist  Cardiovascular: sinus tachycardia, no murmurs   Respiratory: CTAB  Gastrointestinal: soft, nontender and nondistended. No guarding or rebound. ECF pouch wound manager in place. PCT bilious.   Extremities: warm, no dependent edema  Vascular: no cyanosis/erythema  Skin: no rashes  MSK: no joint swelling.     LABS:    01-01    134<L>  |  101  |  48<H>  ----------------------------<  138<H>  4.2   |  26  |  1.61<H>    Ca    8.4      01 Jan 2024 05:30  Phos  3.3     01-01  Mg     2.2     01-01    TPro  8.3  /  Alb  2.3<L>  /  TBili  6.2<H>  /  DBili  4.2<H>  /  AST  96<H>  /  ALT  68<H>  /  AlkPhos  123<H>  01-01  LIVER FUNCTIONS - ( 01 Jan 2024 05:30 )  Alb: 2.3 g/dL / Pro: 8.3 g/dL / ALK PHOS: 123 U/L / ALT: 68 U/L / AST: 96 U/L / GGT: x                               8.5    7.02  )-----------( 177      ( 01 Jan 2024 05:30 )             27.8   PT/INR - ( 01 Jan 2024 05:30 )   PT: 14.6 sec;   INR: 1.29            Urinalysis Basic - ( 01 Jan 2024 05:30 )    Color: x / Appearance: x / SG: x / pH: x  Gluc: 138 mg/dL / Ketone: x  / Bili: x / Urobili: x   Blood: x / Protein: x / Nitrite: x   Leuk Esterase: x / RBC: x / WBC x   Sq Epi: x / Non Sq Epi: x / Bacteria: x    CAPILLARY BLOOD GLUCOSE        MEDICATIONS  (STANDING):  chlorhexidine 2% Cloths 1 Application(s) Topical <User Schedule>  cholestyramine Powder (Sugar-Free) 4 Gram(s) Oral daily  epoetin matt-epbx (RETACRIT) Injectable 73765 Unit(s) SubCutaneous every 7 days  ergocalciferol 65919 Unit(s) Oral <User Schedule>  gabapentin 100 milliGRAM(s) Oral at bedtime  glucagon  Injectable 1 milliGRAM(s) IntraMuscular once  heparin   Injectable 5000 Unit(s) SubCutaneous every 12 hours  labetalol Injectable 10 milliGRAM(s) IV Push every 6 hours  levothyroxine Injectable 30 MICROGram(s) IV Push at bedtime  lipid, fat emulsion (Fish Oil and Plant Based) 20% Infusion 0.54 Gm/kG/Day (20.83 mL/Hr) IV Continuous <Continuous>  lipid, fat emulsion (Fish Oil and Plant Based) 20% Infusion 0.54 Gm/kG/Day (20.83 mL/Hr) IV Continuous <Continuous>  Parenteral Nutrition - Adult 1 Each TPN Continuous <Continuous>  Parenteral Nutrition - Adult 1 Each TPN Continuous <Continuous>  ursodiol Suspension 300 milliGRAM(s) Oral every 8 hours  venlafaxine XR. 150 milliGRAM(s) Oral daily    MEDICATIONS  (PRN):  chlorhexidine 0.12% Liquid 15 milliLiter(s) Swish and Spit two times a day PRN oral hygeine  hydrALAZINE Injectable 10 milliGRAM(s) IV Push every 6 hours PRN SBP >170  lidocaine 2% Viscous 10 milliLiter(s) Swish and Spit every 12 hours PRN tongue pain  LORazepam   Injectable 1 milliGRAM(s) IV Push <User Schedule> PRN Anxiety  ondansetron Injectable 4 milliGRAM(s) IV Push every 6 hours PRN Nausea and/or Vomiting      Poole:	  [ ] None	[ ] Daily Poole Order Placed	   Indication:	  [ ] Strict I and O's    [ ] Obstruction     [ ] Incontinence + Stage 3 or 4 Decubitus  Central Line:  [ ] None	   [ ]  Medication / TPN Administration     [ ] No Peripheral IV        S: Overnight, Eakins pouch leaking and was replaced. Missed output x 1. Patient seen at bedside this morning. Denies chest pain, abdominal pain. No nausea/vomiting.     O: ICU Vital Signs Last 24 Hrs  T(F): 98.1 (01-02-24 @ 06:01), Max: 98.1 (01-02-24 @ 06:01)  HR: 104 (01-02-24 @ 05:16) (101 - 106)  BP: 139/67 (01-02-24 @ 05:00) (115/57 - 145/60)  BP(mean): 96 (01-02-24 @ 05:00) (80 - 96)  ABP: --  RR: 30 (01-02-24 @ 05:00) (18 - 30)  SpO2: 95% (01-02-24 @ 05:16) (93% - 100%)    PHYSICAL EXAM:   General: NAD   Neuro: AOx3   ENT: mucus membrane moist  Cardiovascular: sinus tachycardia, no murmurs   Respiratory: CTAB  Gastrointestinal: soft, nontender and nondistended. No guarding or rebound. ECF pouch wound manager in place. PCT bilious.   Extremities: warm, no dependent edema  Vascular: no cyanosis/erythema  Skin: no rashes  MSK: no joint swelling.     LABS:    01-01    134<L>  |  101  |  48<H>  ----------------------------<  138<H>  4.2   |  26  |  1.61<H>    Ca    8.4      01 Jan 2024 05:30  Phos  3.3     01-01  Mg     2.2     01-01    TPro  8.3  /  Alb  2.3<L>  /  TBili  6.2<H>  /  DBili  4.2<H>  /  AST  96<H>  /  ALT  68<H>  /  AlkPhos  123<H>  01-01  LIVER FUNCTIONS - ( 01 Jan 2024 05:30 )  Alb: 2.3 g/dL / Pro: 8.3 g/dL / ALK PHOS: 123 U/L / ALT: 68 U/L / AST: 96 U/L / GGT: x                               8.5    7.02  )-----------( 177      ( 01 Jan 2024 05:30 )             27.8   PT/INR - ( 01 Jan 2024 05:30 )   PT: 14.6 sec;   INR: 1.29            Urinalysis Basic - ( 01 Jan 2024 05:30 )    Color: x / Appearance: x / SG: x / pH: x  Gluc: 138 mg/dL / Ketone: x  / Bili: x / Urobili: x   Blood: x / Protein: x / Nitrite: x   Leuk Esterase: x / RBC: x / WBC x   Sq Epi: x / Non Sq Epi: x / Bacteria: x    CAPILLARY BLOOD GLUCOSE        MEDICATIONS  (STANDING):  chlorhexidine 2% Cloths 1 Application(s) Topical <User Schedule>  cholestyramine Powder (Sugar-Free) 4 Gram(s) Oral daily  epoetin matt-epbx (RETACRIT) Injectable 38374 Unit(s) SubCutaneous every 7 days  ergocalciferol 85803 Unit(s) Oral <User Schedule>  gabapentin 100 milliGRAM(s) Oral at bedtime  glucagon  Injectable 1 milliGRAM(s) IntraMuscular once  heparin   Injectable 5000 Unit(s) SubCutaneous every 12 hours  labetalol Injectable 10 milliGRAM(s) IV Push every 6 hours  levothyroxine Injectable 30 MICROGram(s) IV Push at bedtime  lipid, fat emulsion (Fish Oil and Plant Based) 20% Infusion 0.54 Gm/kG/Day (20.83 mL/Hr) IV Continuous <Continuous>  lipid, fat emulsion (Fish Oil and Plant Based) 20% Infusion 0.54 Gm/kG/Day (20.83 mL/Hr) IV Continuous <Continuous>  Parenteral Nutrition - Adult 1 Each TPN Continuous <Continuous>  Parenteral Nutrition - Adult 1 Each TPN Continuous <Continuous>  ursodiol Suspension 300 milliGRAM(s) Oral every 8 hours  venlafaxine XR. 150 milliGRAM(s) Oral daily    MEDICATIONS  (PRN):  chlorhexidine 0.12% Liquid 15 milliLiter(s) Swish and Spit two times a day PRN oral hygeine  hydrALAZINE Injectable 10 milliGRAM(s) IV Push every 6 hours PRN SBP >170  lidocaine 2% Viscous 10 milliLiter(s) Swish and Spit every 12 hours PRN tongue pain  LORazepam   Injectable 1 milliGRAM(s) IV Push <User Schedule> PRN Anxiety  ondansetron Injectable 4 milliGRAM(s) IV Push every 6 hours PRN Nausea and/or Vomiting      Poole:	  [ ] None	[ ] Daily Poole Order Placed	   Indication:	  [ ] Strict I and O's    [ ] Obstruction     [ ] Incontinence + Stage 3 or 4 Decubitus  Central Line:  [ ] None	   [ ]  Medication / TPN Administration     [ ] No Peripheral IV

## 2024-01-02 NOTE — PROGRESS NOTE ADULT - SUBJECTIVE AND OBJECTIVE BOX
STATUS POST:    6/27: Laparoscopic lysis of adhesions, converted to open lysis of adhesions, SBR x 3, temporary abdominal closure, 5L cryst, 1L 5% alb, 3 pRBC, 1 FFP, 1 plts  6/28: ex lap, removal of abthera, ileocolic resection, small bowel anastamosis, , 1.6 crystalloid, 250 5%, 175 uop   7/3: IR Gendel drained perihepatic aspiration of serous fluid.   7/5: RTOR exlap, washout, ileocolic resection with end ileostomy, blow hole colostomy, fistula, red rubber from ileostomy to small bowel anastomosis; vicryl bridging mesh; R JOYCE below ileostomy, L JOYCE at small bowel enterotomy repair; 500 LR, 500 5% albumin, 3u PRBC, 2 FFP, 400 UOP,   9/11: s/p perc cony    SUBJECTIVE: Pt seen and examined at bedside this am by surgery team. Patient is lying comfortably in bed with no complaints. Tolerating diet, pain well controlled with current regimen. Patient denies fever, nausea, vomiting, chest pain, and shortness of breath. Wound manager leaked overnight - replaced    MEDICATIONS  (STANDING):  chlorhexidine 2% Cloths 1 Application(s) Topical <User Schedule>  cholestyramine Powder (Sugar-Free) 4 Gram(s) Oral daily  epoetin matt-epbx (RETACRIT) Injectable 13959 Unit(s) SubCutaneous every 7 days  ergocalciferol 52407 Unit(s) Oral <User Schedule>  gabapentin 100 milliGRAM(s) Oral at bedtime  glucagon  Injectable 1 milliGRAM(s) IntraMuscular once  heparin   Injectable 5000 Unit(s) SubCutaneous every 12 hours  labetalol Injectable 10 milliGRAM(s) IV Push every 6 hours  levothyroxine Injectable 30 MICROGram(s) IV Push at bedtime  lipid, fat emulsion (Fish Oil and Plant Based) 20% Infusion 0.54 Gm/kG/Day (20.83 mL/Hr) IV Continuous <Continuous>  Parenteral Nutrition - Adult 1 Each TPN Continuous <Continuous>  ursodiol Suspension 300 milliGRAM(s) Oral every 8 hours  venlafaxine XR. 150 milliGRAM(s) Oral daily    MEDICATIONS  (PRN):  chlorhexidine 0.12% Liquid 15 milliLiter(s) Swish and Spit two times a day PRN oral hygeine  hydrALAZINE Injectable 10 milliGRAM(s) IV Push every 6 hours PRN SBP >170  lidocaine 2% Viscous 10 milliLiter(s) Swish and Spit every 12 hours PRN tongue pain  LORazepam   Injectable 1 milliGRAM(s) IV Push <User Schedule> PRN Anxiety  ondansetron Injectable 4 milliGRAM(s) IV Push every 6 hours PRN Nausea and/or Vomiting      Vital Signs Last 24 Hrs  T(C): 36.7 (02 Jan 2024 06:01), Max: 36.8 (01 Jan 2024 08:00)  T(F): 98.1 (02 Jan 2024 06:01), Max: 98.2 (01 Jan 2024 08:00)  HR: 104 (02 Jan 2024 05:16) (101 - 106)  BP: 139/67 (02 Jan 2024 05:00) (115/57 - 145/60)  BP(mean): 96 (02 Jan 2024 05:00) (80 - 96)  RR: 30 (02 Jan 2024 05:00) (18 - 30)  SpO2: 95% (02 Jan 2024 05:16) (93% - 100%)    Parameters below as of 02 Jan 2024 05:16  Patient On (Oxygen Delivery Method): room air    Physical Exam  General: Patient is doing well and lying in bed comfortably  Constitutional: alert and oriented   Pulm: Nonlabored breathing, no respiratory distress  CV: Regular rate and rhythm, normal sinus rhythm  Abd: soft, nontender, nondistended. No rebound, no guarding. Wound manager in place without leaks, no further bleeding of wound bed. Perc cony with bilious output. Ileostomy with minimal output.   Extremities: warm, well perfused, no edema      I&O's Detail    01 Jan 2024 07:01  -  02 Jan 2024 07:00  --------------------------------------------------------  IN:    Fat Emulsion (Fish Oil &amp; Plant Based) 20% Infusion: 228.8 mL    Oral Fluid: 2461 mL    TPN (Total Parenteral Nutrition): 2055 mL  Total IN: 4744.8 mL    OUT:    Drain (mL): 1 mL    Drain (mL): 50 mL    Drain (mL): 1100 mL    Drain (mL): 1475 mL    Fat Emulsion (Fish Oil &amp; Plant Based) 20% Infusion: 0 mL    Voided (mL): 1600 mL  Total OUT: 4226 mL    Total NET: 518.8 mL        LABS:                        8.5    7.02  )-----------( 177      ( 01 Jan 2024 05:30 )             27.8     01-01    134<L>  |  101  |  48<H>  ----------------------------<  138<H>  4.2   |  26  |  1.61<H>    Ca    8.4      01 Jan 2024 05:30  Phos  3.3     01-01  Mg     2.2     01-01    TPro  8.3  /  Alb  2.3<L>  /  TBili  6.2<H>  /  DBili  4.2<H>  /  AST  96<H>  /  ALT  68<H>  /  AlkPhos  123<H>  01-01    PT/INR - ( 01 Jan 2024 05:30 )   PT: 14.6 sec;   INR: 1.29            Urinalysis Basic - ( 01 Jan 2024 05:30 )    Color: x / Appearance: x / SG: x / pH: x  Gluc: 138 mg/dL / Ketone: x  / Bili: x / Urobili: x   Blood: x / Protein: x / Nitrite: x   Leuk Esterase: x / RBC: x / WBC x   Sq Epi: x / Non Sq Epi: x / Bacteria: x     STATUS POST:    6/27: Laparoscopic lysis of adhesions, converted to open lysis of adhesions, SBR x 3, temporary abdominal closure, 5L cryst, 1L 5% alb, 3 pRBC, 1 FFP, 1 plts  6/28: ex lap, removal of abthera, ileocolic resection, small bowel anastamosis, , 1.6 crystalloid, 250 5%, 175 uop   7/3: IR Gendel drained perihepatic aspiration of serous fluid.   7/5: RTOR exlap, washout, ileocolic resection with end ileostomy, blow hole colostomy, fistula, red rubber from ileostomy to small bowel anastomosis; vicryl bridging mesh; R JOYCE below ileostomy, L JOYCE at small bowel enterotomy repair; 500 LR, 500 5% albumin, 3u PRBC, 2 FFP, 400 UOP,   9/11: s/p perc cony    SUBJECTIVE: Pt seen and examined at bedside this am by surgery team. Patient is lying comfortably in bed with no complaints. Tolerating diet, pain well controlled with current regimen. Patient denies fever, nausea, vomiting, chest pain, and shortness of breath. Wound manager leaked overnight - replaced    MEDICATIONS  (STANDING):  chlorhexidine 2% Cloths 1 Application(s) Topical <User Schedule>  cholestyramine Powder (Sugar-Free) 4 Gram(s) Oral daily  epoetin matt-epbx (RETACRIT) Injectable 89532 Unit(s) SubCutaneous every 7 days  ergocalciferol 87275 Unit(s) Oral <User Schedule>  gabapentin 100 milliGRAM(s) Oral at bedtime  glucagon  Injectable 1 milliGRAM(s) IntraMuscular once  heparin   Injectable 5000 Unit(s) SubCutaneous every 12 hours  labetalol Injectable 10 milliGRAM(s) IV Push every 6 hours  levothyroxine Injectable 30 MICROGram(s) IV Push at bedtime  lipid, fat emulsion (Fish Oil and Plant Based) 20% Infusion 0.54 Gm/kG/Day (20.83 mL/Hr) IV Continuous <Continuous>  Parenteral Nutrition - Adult 1 Each TPN Continuous <Continuous>  ursodiol Suspension 300 milliGRAM(s) Oral every 8 hours  venlafaxine XR. 150 milliGRAM(s) Oral daily    MEDICATIONS  (PRN):  chlorhexidine 0.12% Liquid 15 milliLiter(s) Swish and Spit two times a day PRN oral hygeine  hydrALAZINE Injectable 10 milliGRAM(s) IV Push every 6 hours PRN SBP >170  lidocaine 2% Viscous 10 milliLiter(s) Swish and Spit every 12 hours PRN tongue pain  LORazepam   Injectable 1 milliGRAM(s) IV Push <User Schedule> PRN Anxiety  ondansetron Injectable 4 milliGRAM(s) IV Push every 6 hours PRN Nausea and/or Vomiting      Vital Signs Last 24 Hrs  T(C): 36.7 (02 Jan 2024 06:01), Max: 36.8 (01 Jan 2024 08:00)  T(F): 98.1 (02 Jan 2024 06:01), Max: 98.2 (01 Jan 2024 08:00)  HR: 104 (02 Jan 2024 05:16) (101 - 106)  BP: 139/67 (02 Jan 2024 05:00) (115/57 - 145/60)  BP(mean): 96 (02 Jan 2024 05:00) (80 - 96)  RR: 30 (02 Jan 2024 05:00) (18 - 30)  SpO2: 95% (02 Jan 2024 05:16) (93% - 100%)    Parameters below as of 02 Jan 2024 05:16  Patient On (Oxygen Delivery Method): room air    Physical Exam  General: Patient is doing well and lying in bed comfortably  Constitutional: alert and oriented   Pulm: Nonlabored breathing, no respiratory distress  CV: Regular rate and rhythm, normal sinus rhythm  Abd: soft, nontender, nondistended. No rebound, no guarding. Wound manager in place without leaks, no further bleeding of wound bed. Perc cony with bilious output. Ileostomy with minimal output.   Extremities: warm, well perfused, no edema      I&O's Detail    01 Jan 2024 07:01  -  02 Jan 2024 07:00  --------------------------------------------------------  IN:    Fat Emulsion (Fish Oil &amp; Plant Based) 20% Infusion: 228.8 mL    Oral Fluid: 2461 mL    TPN (Total Parenteral Nutrition): 2055 mL  Total IN: 4744.8 mL    OUT:    Drain (mL): 1 mL    Drain (mL): 50 mL    Drain (mL): 1100 mL    Drain (mL): 1475 mL    Fat Emulsion (Fish Oil &amp; Plant Based) 20% Infusion: 0 mL    Voided (mL): 1600 mL  Total OUT: 4226 mL    Total NET: 518.8 mL        LABS:                        8.5    7.02  )-----------( 177      ( 01 Jan 2024 05:30 )             27.8     01-01    134<L>  |  101  |  48<H>  ----------------------------<  138<H>  4.2   |  26  |  1.61<H>    Ca    8.4      01 Jan 2024 05:30  Phos  3.3     01-01  Mg     2.2     01-01    TPro  8.3  /  Alb  2.3<L>  /  TBili  6.2<H>  /  DBili  4.2<H>  /  AST  96<H>  /  ALT  68<H>  /  AlkPhos  123<H>  01-01    PT/INR - ( 01 Jan 2024 05:30 )   PT: 14.6 sec;   INR: 1.29            Urinalysis Basic - ( 01 Jan 2024 05:30 )    Color: x / Appearance: x / SG: x / pH: x  Gluc: 138 mg/dL / Ketone: x  / Bili: x / Urobili: x   Blood: x / Protein: x / Nitrite: x   Leuk Esterase: x / RBC: x / WBC x   Sq Epi: x / Non Sq Epi: x / Bacteria: x     STATUS POST:    6/27: Laparoscopic lysis of adhesions, converted to open lysis of adhesions, SBR x 3, temporary abdominal closure, 5L cryst, 1L 5% alb, 3 pRBC, 1 FFP, 1 plts  6/28: ex lap, removal of abthera, ileocolic resection, small bowel anastamosis, , 1.6 crystalloid, 250 5%, 175 uop   7/3: IR Gendel drained perihepatic aspiration of serous fluid.   7/5: RTOR exlap, washout, ileocolic resection with end ileostomy, blow hole colostomy, fistula, red rubber from ileostomy to small bowel anastomosis; vicryl bridging mesh; R JOYCE below ileostomy, L JOYCE at small bowel enterotomy repair; 500 LR, 500 5% albumin, 3u PRBC, 2 FFP, 400 UOP,   9/11: s/p perc cony    SUBJECTIVE: Pt seen and examined at bedside this am by surgery team. Patient is lying comfortably in bed with no complaints. Tolerating diet, pain well controlled with current regimen. Patient denies fever, nausea, vomiting, chest pain, and shortness of breath. Wound manager leaked overnight - replaced    MEDICATIONS  (STANDING):  chlorhexidine 2% Cloths 1 Application(s) Topical <User Schedule>  cholestyramine Powder (Sugar-Free) 4 Gram(s) Oral daily  epoetin matt-epbx (RETACRIT) Injectable 73859 Unit(s) SubCutaneous every 7 days  ergocalciferol 38349 Unit(s) Oral <User Schedule>  gabapentin 100 milliGRAM(s) Oral at bedtime  glucagon  Injectable 1 milliGRAM(s) IntraMuscular once  heparin   Injectable 5000 Unit(s) SubCutaneous every 12 hours  labetalol Injectable 10 milliGRAM(s) IV Push every 6 hours  levothyroxine Injectable 30 MICROGram(s) IV Push at bedtime  lipid, fat emulsion (Fish Oil and Plant Based) 20% Infusion 0.54 Gm/kG/Day (20.83 mL/Hr) IV Continuous <Continuous>  Parenteral Nutrition - Adult 1 Each TPN Continuous <Continuous>  ursodiol Suspension 300 milliGRAM(s) Oral every 8 hours  venlafaxine XR. 150 milliGRAM(s) Oral daily    MEDICATIONS  (PRN):  chlorhexidine 0.12% Liquid 15 milliLiter(s) Swish and Spit two times a day PRN oral hygeine  hydrALAZINE Injectable 10 milliGRAM(s) IV Push every 6 hours PRN SBP >170  lidocaine 2% Viscous 10 milliLiter(s) Swish and Spit every 12 hours PRN tongue pain  LORazepam   Injectable 1 milliGRAM(s) IV Push <User Schedule> PRN Anxiety  ondansetron Injectable 4 milliGRAM(s) IV Push every 6 hours PRN Nausea and/or Vomiting      Vital Signs Last 24 Hrs  T(C): 36.7 (02 Jan 2024 06:01), Max: 36.8 (01 Jan 2024 08:00)  T(F): 98.1 (02 Jan 2024 06:01), Max: 98.2 (01 Jan 2024 08:00)  HR: 104 (02 Jan 2024 05:16) (101 - 106)  BP: 139/67 (02 Jan 2024 05:00) (115/57 - 145/60)  BP(mean): 96 (02 Jan 2024 05:00) (80 - 96)  RR: 30 (02 Jan 2024 05:00) (18 - 30)  SpO2: 95% (02 Jan 2024 05:16) (93% - 100%)    Parameters below as of 02 Jan 2024 05:16  Patient On (Oxygen Delivery Method): room air    Physical Exam  General: Patient is doing well and lying in bed comfortably  Constitutional: alert and oriented   Pulm: Nonlabored breathing, no respiratory distress  CV: Regular rate and rhythm, normal sinus rhythm  Abd: soft, nontender, nondistended. No rebound, no guarding. Wound manager in place without leaks, no further bleeding of wound bed. Perc cony with bilious output. Ileostomy with minimal output.   Extremities: warm, well perfused, no edema      I&O's Detail    01 Jan 2024 07:01  -  02 Jan 2024 07:00  --------------------------------------------------------  IN:    Fat Emulsion (Fish Oil &amp; Plant Based) 20% Infusion: 228.8 mL    Oral Fluid: 2461 mL    TPN (Total Parenteral Nutrition): 2055 mL  Total IN: 4744.8 mL    OUT:    Drain (mL): 1 mL    Drain (mL): 50 mL    Drain (mL): 1100 mL    Drain (mL): 1475 mL    Fat Emulsion (Fish Oil &amp; Plant Based) 20% Infusion: 0 mL    Voided (mL): 1600 mL  Total OUT: 4226 mL    Total NET: 518.8 mL        LABS:                        8.5    7.02  )-----------( 177      ( 01 Jan 2024 05:30 )             27.8     01-01    134<L>  |  101  |  48<H>  ----------------------------<  138<H>  4.2   |  26  |  1.61<H>    Ca    8.4      01 Jan 2024 05:30  Phos  3.3     01-01  Mg     2.2     01-01    TPro  8.3  /  Alb  2.3<L>  /  TBili  6.2<H>  /  DBili  4.2<H>  /  AST  96<H>  /  ALT  68<H>  /  AlkPhos  123<H>  01-01    PT/INR - ( 01 Jan 2024 05:30 )   PT: 14.6 sec;   INR: 1.29            Urinalysis Basic - ( 01 Jan 2024 05:30 )    Color: x / Appearance: x / SG: x / pH: x  Gluc: 138 mg/dL / Ketone: x  / Bili: x / Urobili: x   Blood: x / Protein: x / Nitrite: x   Leuk Esterase: x / RBC: x / WBC x   Sq Epi: x / Non Sq Epi: x / Bacteria: x

## 2024-01-02 NOTE — PROGRESS NOTE ADULT - SUBJECTIVE AND OBJECTIVE BOX
ON: abdominal pouch leaking, output not registered.       SUBJECTIVE: No new complaints overnight, doing well on BiPAP     MEDICATIONS  (STANDING):  chlorhexidine 2% Cloths 1 Application(s) Topical <User Schedule>  cholestyramine Powder (Sugar-Free) 4 Gram(s) Oral daily  epoetin matt-epbx (RETACRIT) Injectable 87977 Unit(s) SubCutaneous every 7 days  ergocalciferol 88957 Unit(s) Oral <User Schedule>  gabapentin 100 milliGRAM(s) Oral at bedtime  glucagon  Injectable 1 milliGRAM(s) IntraMuscular once  heparin   Injectable 5000 Unit(s) SubCutaneous every 12 hours  labetalol Injectable 10 milliGRAM(s) IV Push every 6 hours  levothyroxine Injectable 30 MICROGram(s) IV Push at bedtime  lipid, fat emulsion (Fish Oil and Plant Based) 20% Infusion 0.54 Gm/kG/Day (20.83 mL/Hr) IV Continuous <Continuous>  lipid, fat emulsion (Fish Oil and Plant Based) 20% Infusion 0.54 Gm/kG/Day (20.83 mL/Hr) IV Continuous <Continuous>  Parenteral Nutrition - Adult 1 Each TPN Continuous <Continuous>  Parenteral Nutrition - Adult 1 Each TPN Continuous <Continuous>  ursodiol Suspension 300 milliGRAM(s) Oral every 8 hours  venlafaxine XR. 150 milliGRAM(s) Oral daily    MEDICATIONS  (PRN):  chlorhexidine 0.12% Liquid 15 milliLiter(s) Swish and Spit two times a day PRN oral hygeine  hydrALAZINE Injectable 10 milliGRAM(s) IV Push every 6 hours PRN SBP >170  lidocaine 2% Viscous 10 milliLiter(s) Swish and Spit every 12 hours PRN tongue pain  LORazepam   Injectable 1 milliGRAM(s) IV Push <User Schedule> PRN Anxiety  ondansetron Injectable 4 milliGRAM(s) IV Push every 6 hours PRN Nausea and/or Vomiting      Drips:     ICU Vital Signs Last 24 Hrs  T(C): 36.7 (02 Jan 2024 06:01), Max: 36.8 (01 Jan 2024 08:00)  T(F): 98.1 (02 Jan 2024 06:01), Max: 98.2 (01 Jan 2024 08:00)  HR: 104 (02 Jan 2024 05:16) (101 - 106)  BP: 139/67 (02 Jan 2024 05:00) (115/57 - 145/60)  BP(mean): 96 (02 Jan 2024 05:00) (80 - 96)  ABP: --  ABP(mean): --  RR: 30 (02 Jan 2024 05:00) (18 - 30)  SpO2: 95% (02 Jan 2024 05:16) (93% - 100%)    O2 Parameters below as of 02 Jan 2024 05:16  Patient On (Oxygen Delivery Method): room air            Physical Exam:  GENERAL: NAD, resting comfortably in bed  HEENT: NCAT, MMM  C/V: Normal rate, normal peripheral perfusion, no murmurs  PULM: Nonlabored breathing, no respiratory distress, CTA b/l  ABD: Soft, ND, NT, no rebound tenderness, no guarding, ECF pouched wound manager in place, PCT bilious  EXTREM: WWP, no edema, SCDs in place  NEURO: No focal deficits      I&O's Summary    01 Jan 2024 07:01  -  02 Jan 2024 07:00  --------------------------------------------------------  IN: 4744.8 mL / OUT: 4226 mL / NET: 518.8 mL        LABS:                        8.5    7.02  )-----------( 177      ( 01 Jan 2024 05:30 )             27.8     01-01    134<L>  |  101  |  48<H>  ----------------------------<  138<H>  4.2   |  26  |  1.61<H>    Ca    8.4      01 Jan 2024 05:30  Phos  3.3     01-01  Mg     2.2     01-01    TPro  8.3  /  Alb  2.3<L>  /  TBili  6.2<H>  /  DBili  4.2<H>  /  AST  96<H>  /  ALT  68<H>  /  AlkPhos  123<H>  01-01    PT/INR - ( 01 Jan 2024 05:30 )   PT: 14.6 sec;   INR: 1.29            Urinalysis Basic - ( 01 Jan 2024 05:30 )    Color: x / Appearance: x / SG: x / pH: x  Gluc: 138 mg/dL / Ketone: x  / Bili: x / Urobili: x   Blood: x / Protein: x / Nitrite: x   Leuk Esterase: x / RBC: x / WBC x   Sq Epi: x / Non Sq Epi: x / Bacteria: x      CAPILLARY BLOOD GLUCOSE        LIVER FUNCTIONS - ( 01 Jan 2024 05:30 )  Alb: 2.3 g/dL / Pro: 8.3 g/dL / ALK PHOS: 123 U/L / ALT: 68 U/L / AST: 96 U/L / GGT: x                ON: abdominal pouch leaking, output not registered.       SUBJECTIVE: No new complaints overnight, doing well on BiPAP     MEDICATIONS  (STANDING):  chlorhexidine 2% Cloths 1 Application(s) Topical <User Schedule>  cholestyramine Powder (Sugar-Free) 4 Gram(s) Oral daily  epoetin matt-epbx (RETACRIT) Injectable 64260 Unit(s) SubCutaneous every 7 days  ergocalciferol 51829 Unit(s) Oral <User Schedule>  gabapentin 100 milliGRAM(s) Oral at bedtime  glucagon  Injectable 1 milliGRAM(s) IntraMuscular once  heparin   Injectable 5000 Unit(s) SubCutaneous every 12 hours  labetalol Injectable 10 milliGRAM(s) IV Push every 6 hours  levothyroxine Injectable 30 MICROGram(s) IV Push at bedtime  lipid, fat emulsion (Fish Oil and Plant Based) 20% Infusion 0.54 Gm/kG/Day (20.83 mL/Hr) IV Continuous <Continuous>  lipid, fat emulsion (Fish Oil and Plant Based) 20% Infusion 0.54 Gm/kG/Day (20.83 mL/Hr) IV Continuous <Continuous>  Parenteral Nutrition - Adult 1 Each TPN Continuous <Continuous>  Parenteral Nutrition - Adult 1 Each TPN Continuous <Continuous>  ursodiol Suspension 300 milliGRAM(s) Oral every 8 hours  venlafaxine XR. 150 milliGRAM(s) Oral daily    MEDICATIONS  (PRN):  chlorhexidine 0.12% Liquid 15 milliLiter(s) Swish and Spit two times a day PRN oral hygeine  hydrALAZINE Injectable 10 milliGRAM(s) IV Push every 6 hours PRN SBP >170  lidocaine 2% Viscous 10 milliLiter(s) Swish and Spit every 12 hours PRN tongue pain  LORazepam   Injectable 1 milliGRAM(s) IV Push <User Schedule> PRN Anxiety  ondansetron Injectable 4 milliGRAM(s) IV Push every 6 hours PRN Nausea and/or Vomiting      Drips:     ICU Vital Signs Last 24 Hrs  T(C): 36.7 (02 Jan 2024 06:01), Max: 36.8 (01 Jan 2024 08:00)  T(F): 98.1 (02 Jan 2024 06:01), Max: 98.2 (01 Jan 2024 08:00)  HR: 104 (02 Jan 2024 05:16) (101 - 106)  BP: 139/67 (02 Jan 2024 05:00) (115/57 - 145/60)  BP(mean): 96 (02 Jan 2024 05:00) (80 - 96)  ABP: --  ABP(mean): --  RR: 30 (02 Jan 2024 05:00) (18 - 30)  SpO2: 95% (02 Jan 2024 05:16) (93% - 100%)    O2 Parameters below as of 02 Jan 2024 05:16  Patient On (Oxygen Delivery Method): room air            Physical Exam:  GENERAL: NAD, resting comfortably in bed  HEENT: NCAT, MMM  C/V: Normal rate, normal peripheral perfusion, no murmurs  PULM: Nonlabored breathing, no respiratory distress, CTA b/l  ABD: Soft, ND, NT, no rebound tenderness, no guarding, ECF pouched wound manager in place, PCT bilious  EXTREM: WWP, no edema, SCDs in place  NEURO: No focal deficits      I&O's Summary    01 Jan 2024 07:01  -  02 Jan 2024 07:00  --------------------------------------------------------  IN: 4744.8 mL / OUT: 4226 mL / NET: 518.8 mL        LABS:                        8.5    7.02  )-----------( 177      ( 01 Jan 2024 05:30 )             27.8     01-01    134<L>  |  101  |  48<H>  ----------------------------<  138<H>  4.2   |  26  |  1.61<H>    Ca    8.4      01 Jan 2024 05:30  Phos  3.3     01-01  Mg     2.2     01-01    TPro  8.3  /  Alb  2.3<L>  /  TBili  6.2<H>  /  DBili  4.2<H>  /  AST  96<H>  /  ALT  68<H>  /  AlkPhos  123<H>  01-01    PT/INR - ( 01 Jan 2024 05:30 )   PT: 14.6 sec;   INR: 1.29            Urinalysis Basic - ( 01 Jan 2024 05:30 )    Color: x / Appearance: x / SG: x / pH: x  Gluc: 138 mg/dL / Ketone: x  / Bili: x / Urobili: x   Blood: x / Protein: x / Nitrite: x   Leuk Esterase: x / RBC: x / WBC x   Sq Epi: x / Non Sq Epi: x / Bacteria: x      CAPILLARY BLOOD GLUCOSE        LIVER FUNCTIONS - ( 01 Jan 2024 05:30 )  Alb: 2.3 g/dL / Pro: 8.3 g/dL / ALK PHOS: 123 U/L / ALT: 68 U/L / AST: 96 U/L / GGT: x

## 2024-01-03 NOTE — PROGRESS NOTE ADULT - ATTENDING COMMENTS
given 500 cc crystalloid yesterday and 1L this AM for net even  cr improving  Per cony tube capped yesterday, dbili stable   switched labetalol back to metoprolol to allow uptitration for HR control.  Feeling good this AM    Decision making high complexity

## 2024-01-03 NOTE — CHART NOTE - NSCHARTNOTEFT_GEN_A_CORE
Admitting Diagnosis:   Patient is a 78y old  Male who presents with a chief complaint of SBO (29 Dec 2023 08:33)      PAST MEDICAL & SURGICAL HISTORY:  Essential hypertension  Hypertension      Atrial fibrillation  s/p cardioversion  and   Pt. reports 4 DCCV  Now on Amiodarone 200mg PO bid and Eliquis 5mg PO bid  Last DCCV 4 yrs ago at [facility]      Crohn's disease  s/p partial resection of ileum      Hyperlipidemia      Hypothyroidism      History of depression  On Venlafaxine ER 150mg PO bid      Junctional rhythm      H/O knee surgery      History of cataract surgery          Current Nutrition Order:   TPN via PICC- 1.9L bag running over 14hrs, providing 380g Dex, 120g AA, 50g SMOF lipids daily; provides 2272 kcals, 120g protein daily, GIR 4.86 mg/kg/min   PO- Regular diet + 2 LPS per day [provide 100 kcals, 15g protein each], + Ensure Max daily [150 kcals, 20g protein]    PO Intake: Good (%) [   ]  Fair (50-75%) [ x ] Poor (<25%) [   ]    GI Issues:   1/3/24:  ileostomy output 0cc x 24hrs, abdominal wound/fistula output 2550cc x 24hrs, per cony output 275cc x 24hrs  : ileostomy output 50cc x 24hrs, abdominal wound/fistula output 910cc x 24hrs, per cony output 1225cc x 24hrs  : ileostomy output 50cc x 24hrs, abdominal wound/fistula output 1175cc x 24hrs, per cony output 1050cc x 24hrs  : ileostomy output 50cc x 24hrs, abdominal wound/fistula output 1100cc x 24hrs, per cony output 1050cc x 24hrs     : ileostomy output 20cc x 24hrs, abdominal wound/fistula output 1275cc x 24hrs, per cony output 775cc x 24hrs    : ileostomy output 15cc x 24hrs, abdominal wound/fistula output  1230cc x 24hrs, per cony output 1010cc x 24hrs    : ileostomy output 75cc x 24hrs, abdominal wound/fistula output  600cc x 24hrs, per cony output 1125cc x 24hrs    : ileostomy output 35cc x 24hrs, abdominal wound/fistula output  250cc x 24hrs, per cony output 745cc x 24hrs    : ileostomy output 30cc x 24hrs, abdominal wound/fistula output  2400cc x 24hrs, per cony output 550cc x 24hrs   : ileostomy output 50 cc x 24hrs, abdominal wound/fistula output 2175 cc x 24hrs, per cony output 530 cc x 24hrs   : ileostomy output 25cc x 24hrs, abdominal wound/fistula output 2350 cc x 24hrs, per cony output 775cc x 24hrs   : ileostomy output 55cc x 24hrs, abdominal wound/fistula output 2025 cc x 24hrs, per cony output 450cc x 24hrs   11/15:  ileostomy output 65cc x 24hrs, abdominal wound/fistula output 875cc x 24hrs, per cony output 775cc x 24hrs    Pain: no pain/discomfort noted    Skin Integrity: lower back wound/healed, jaundice, abd wound and fistula with wound manager in place, RLQ ileostomy, perc cony drain    Labs:       135  |  102  |  42<H>  ----------------------------<  139<H>  4.4   |  26  |  1.47<H>    Ca    8.3<L>      2024 06:14  Phos  3.1       Mg     2.0         TPro  8.3  /  Alb  2.2<L>  /  TBili  6.6<H>  /  DBili  4.2<H>  /  AST  94<H>  /  ALT  66<H>  /  AlkPhos  123<H>          Medications:  MEDICATIONS  (STANDING):  chlorhexidine 2% Cloths 1 Application(s) Topical <User Schedule>  cholestyramine Powder (Sugar-Free) 4 Gram(s) Oral daily  epoetin matt-epbx (RETACRIT) Injectable 53128 Unit(s) SubCutaneous every 7 days  ergocalciferol 61747 Unit(s) Oral <User Schedule>  gabapentin 100 milliGRAM(s) Oral at bedtime  glucagon  Injectable 1 milliGRAM(s) IntraMuscular once  heparin   Injectable 5000 Unit(s) SubCutaneous every 12 hours  lactated ringers Bolus 500 milliLiter(s) IV Bolus once  levothyroxine Injectable 30 MICROGram(s) IV Push at bedtime  lipid, fat emulsion (Fish Oil and Plant Based) 20% Infusion 0.54 Gm/kG/Day (20.83 mL/Hr) IV Continuous <Continuous>  metoprolol tartrate Injectable 7.5 milliGRAM(s) IV Push every 4 hours  Parenteral Nutrition - Adult 1 Each TPN Continuous <Continuous>  Parenteral Nutrition - Adult 1 Each TPN Continuous <Continuous>  ursodiol Suspension 300 milliGRAM(s) Oral every 8 hours  venlafaxine XR. 150 milliGRAM(s) Oral daily    MEDICATIONS  (PRN):  chlorhexidine 0.12% Liquid 15 milliLiter(s) Swish and Spit two times a day PRN oral hygeine  hydrALAZINE Injectable 10 milliGRAM(s) IV Push every 6 hours PRN SBP >170  lidocaine 2% Viscous 10 milliLiter(s) Swish and Spit every 12 hours PRN tongue pain  LORazepam   Injectable 1 milliGRAM(s) IV Push <User Schedule> PRN Anxiety  ondansetron Injectable 4 milliGRAM(s) IV Push every 6 hours PRN Nausea and/or Vomiting        Daily Weight in k.3kg [19 Dec 2023]  Daily 93.3kg [11 Dec 2023]  Daily 93.3kg [08 Dec 2023]  Daily 93.3kg [6 Dec 2023]  Daily 94.2kg [2023]  Daily 94.4kg [2023]  Daily 94.2kg [2023]  Daily 94.2kg [2023]  Daily 94.2kg [2023]  Daily 94.2 [15 Nov 2023]  Daily 94.2kg [2023]  Daily 95.8kg [2023]  Daily 94.2kg [2023]  Daily 85.2kg [2023]  Daily 85.2kg [31 Oct 2023]  Daily 85.2kg [24 Oct 2023]  Daily 85.2kg [20 Oct 2023]  Daily 81.9kg [18 Oct 2023]  Daily 85.2kg [16 Oct 2023]  Daily   95.2kg [12 Oct 2023]   Daily 87.7kg [11 Oct 2023]  Daily 87.4kg [09 Oct 2023]  Daily 86.4kg [07 Oct 2023]  Daily 82.5kg [06 Oct 2023]  Daily 82.6kg [4 Oct 2023]   Daily 83.5kg [03 Oct 2023]  Daily 84.5kg [2 Oct 2023]  Daily 87.2kg [1 Oct 2023]  Daily 88.3kg [29 Sep 2023]  Daily 89.9kg [28 Sep 2023]  Daily 87.7kg [24 Sep 2023]  Daily 90.4kg [21 Sep 2023]  Daily 91.4kg [20 Sep 2023]  Daily 86.7kg [18 Sep 2023]  Daily 88.1kg [16 Sep 2023]  Daily 88.9kg [15 Sep 2023]   Daily 89.1 [14 Sep 2023]  Daily 92.9kg [6 Sep 2023]  Daily 91.8kg [27 Aug 2023]  Daily 101kg [9 Aug 2023]     Weight Change: weight trending down throughout admission, now appears to be trending back up. Recommend continue weekly/daily weights for trending & ensuring adequacy of nutrition provision    IBW: 86.4kg  % IBW: 108.0    Estimated energy needs:   Ideal body weight (86.4kg) used for calculations as pt >100% IBW and BMI <30 per [facility] Standards of Care. Needs estimated for age and adjusted for current clinical status, increased needs for post-op & open abd wound healing  Calories: 9778-3149 kcals based on 30-40 kcals/kg  Protein: 129.6-172.8 g protein based on 1.5-2.0g protein/kg + additional depending on outputs  *Fluid needs per team    Subjective:   78 M w/ Crohn's, AFib/Flutter s/p DCCVs on amiodarone, remote ileocectomy and open appendectomy. Admitted () for SBO vs Crohns flare, s/p NGT decompression and s/p lap TRE converted to open TRE, SBR x 3, left in discontinuity with abthera vac on (), RTOR for ileocolic resection, small bowel anastomosis, and abdominal wall closure on (), c/b fluid collection s/p IR aspiration of perihepatic fluid on (7/3), c/b wound dehiscence s/p RTOR exlap, washout, ileocolic resection with end ileostomy, blow hole colostomy, red rubber from ileostomy to small bowel anastomosis; vicryl bridging mesh on () transferred to SICU postoperatively for hemodynamic monitoring, with hospital course complicated by periods of AMS most recently on  and associated with oliguria, severe ELVI and hyponatremia, which resolved but stepped back up for to SICU on 9/10 for acute AMS, intermittent hypoglycemia, AFib with RVR. Percutaneous Cholecystostomy placed on  for enlarged GB, PCT capped 10/5 and uncapped 10/25 for hyperbilirubinemia, Right brachial DVT, left basillic and cephalic superficial thrombus on duplex , CT  with ALDEN ground glass opacity of unclear etiology, completed empiric 7day cefepime course , rising T-bili of unclear etilogy, now w resolved candida glabrata fungemia, ELVI improving.     Chart reviewed.   TPN via PICC remains primary means to nutrition. 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 Continues to receive lipids in TPN daily. Latest selenium 75 <L>, copper 12 <L> []. To increase selenium in TPN and provide copper daily, monitor & recheck. Total bili 6.4 <H>, direct bilirubin 4.1 <H>,  []. Pt continues to receive sublingual ergocalciferol in additional to weekly ergocalciferol supplementation. TPN reordered for tonight. RDN will continue to monitor, reassess, and intervene as appropriate.     Previous Nutrition Diagnosis:  1. Increased Nutrient Needs R/T physiological demands for nutrient AEB post-op wound healing  Active [ x  ]  Resolved [   ]  New:  2. Altered GI function R/T extensive GI history, insufficient bowel, high output fistula AEB supplemental PN requirement to meet nutrition needs    Goal:  Pt will meet at least 75% of protein & energy needs via most appropriate route for nutrition     Recommendations:  1. c/w TPN via PICC as primary means to nutrition - recommend 1.9L bag running over 14hrs (135ml/hr), providing 380g Dex, 120g AA, 50g SMOF lipids daily; provides 2272 kcals, 120g protein daily, GIR 4.86 mg/kg/min   - provides 26.3 kcals/kg, 20.7 non-protein kcals/kg, 1.4g protein/kg  - monitor weights & wound healing, adjust substrates as indicated  - fluid & lytes per team  - MVI, thiamine, vit C in bag  2. Increase selenium in TPN, provide copper daily  - monitor zinc levels and adjust prn  - recheck levels in 4 weeks  - c/w Vit D supplementation and recheck levels in 4 wks  3. Recommend weekly weights for trending/ensuring adequacy of nutrition provision  - please obtain new weight  4. PO diet per team discretion  -  c/w Regular diet as medically feasible to allow for more menu options  - c/w 1 LPS BID [200 kcals, 30g protein] + 1 Ensure Max daily [150 kcals, 30g protein] as amenable  - consider NPO for bowel rest as indicated   - ongoing education prn  5. Hydration per team  - risk for dehydration with high outputs, monitor  - additional fluids per team  - consider oral rehydration solution  6. Weekly lipid panel while on TPN  - hold/decrease lipids if TG >400  - continue to trend LFTs  7. Monitor BMP/Mg/Phos, POC BG while on TPN  - monitor & replenish lytes outside TPN bag prn  8. Continue close monitoring of clinical course & adjust recommendations prn    Education: ongoing diet education prn    Risk Level: High [  xx ] Moderate [   ] Low [   ]. Admitting Diagnosis:   Patient is a 78y old  Male who presents with a chief complaint of SBO (29 Dec 2023 08:33)      PAST MEDICAL & SURGICAL HISTORY:  Essential hypertension  Hypertension      Atrial fibrillation  s/p cardioversion  and   Pt. reports 4 DCCV  Now on Amiodarone 200mg PO bid and Eliquis 5mg PO bid  Last DCCV 4 yrs ago at [facility]      Crohn's disease  s/p partial resection of ileum      Hyperlipidemia      Hypothyroidism      History of depression  On Venlafaxine ER 150mg PO bid      Junctional rhythm      H/O knee surgery      History of cataract surgery          Current Nutrition Order:   TPN via PICC- 1.9L bag running over 14hrs, providing 380g Dex, 120g AA, 50g SMOF lipids daily; provides 2272 kcals, 120g protein daily, GIR 4.86 mg/kg/min   PO- Regular diet + 2 LPS per day [provide 100 kcals, 15g protein each], + Ensure Max daily [150 kcals, 20g protein]    PO Intake: Good (%) [   ]  Fair (50-75%) [ x ] Poor (<25%) [   ]    GI Issues:   1/3/24:  ileostomy output 0cc x 24hrs, abdominal wound/fistula output 2550cc x 24hrs, per cony output 275cc x 24hrs  : ileostomy output 50cc x 24hrs, abdominal wound/fistula output 910cc x 24hrs, per cony output 1225cc x 24hrs  : ileostomy output 50cc x 24hrs, abdominal wound/fistula output 1175cc x 24hrs, per cony output 1050cc x 24hrs  : ileostomy output 50cc x 24hrs, abdominal wound/fistula output 1100cc x 24hrs, per cony output 1050cc x 24hrs     : ileostomy output 20cc x 24hrs, abdominal wound/fistula output 1275cc x 24hrs, per cony output 775cc x 24hrs    : ileostomy output 15cc x 24hrs, abdominal wound/fistula output  1230cc x 24hrs, per cony output 1010cc x 24hrs    : ileostomy output 75cc x 24hrs, abdominal wound/fistula output  600cc x 24hrs, per cony output 1125cc x 24hrs    : ileostomy output 35cc x 24hrs, abdominal wound/fistula output  250cc x 24hrs, per cony output 745cc x 24hrs    : ileostomy output 30cc x 24hrs, abdominal wound/fistula output  2400cc x 24hrs, per cony output 550cc x 24hrs   : ileostomy output 50 cc x 24hrs, abdominal wound/fistula output 2175 cc x 24hrs, per cony output 530 cc x 24hrs   : ileostomy output 25cc x 24hrs, abdominal wound/fistula output 2350 cc x 24hrs, per cony output 775cc x 24hrs   : ileostomy output 55cc x 24hrs, abdominal wound/fistula output 2025 cc x 24hrs, per cony output 450cc x 24hrs   11/15:  ileostomy output 65cc x 24hrs, abdominal wound/fistula output 875cc x 24hrs, per cony output 775cc x 24hrs    Pain: no pain/discomfort noted    Skin Integrity: lower back wound/healed, jaundice, abd wound and fistula with wound manager in place, RLQ ileostomy, perc cony drain    Labs:       135  |  102  |  42<H>  ----------------------------<  139<H>  4.4   |  26  |  1.47<H>    Ca    8.3<L>      2024 06:14  Phos  3.1       Mg     2.0         TPro  8.3  /  Alb  2.2<L>  /  TBili  6.6<H>  /  DBili  4.2<H>  /  AST  94<H>  /  ALT  66<H>  /  AlkPhos  123<H>          Medications:  MEDICATIONS  (STANDING):  chlorhexidine 2% Cloths 1 Application(s) Topical <User Schedule>  cholestyramine Powder (Sugar-Free) 4 Gram(s) Oral daily  epoetin matt-epbx (RETACRIT) Injectable 45353 Unit(s) SubCutaneous every 7 days  ergocalciferol 78750 Unit(s) Oral <User Schedule>  gabapentin 100 milliGRAM(s) Oral at bedtime  glucagon  Injectable 1 milliGRAM(s) IntraMuscular once  heparin   Injectable 5000 Unit(s) SubCutaneous every 12 hours  lactated ringers Bolus 500 milliLiter(s) IV Bolus once  levothyroxine Injectable 30 MICROGram(s) IV Push at bedtime  lipid, fat emulsion (Fish Oil and Plant Based) 20% Infusion 0.54 Gm/kG/Day (20.83 mL/Hr) IV Continuous <Continuous>  metoprolol tartrate Injectable 7.5 milliGRAM(s) IV Push every 4 hours  Parenteral Nutrition - Adult 1 Each TPN Continuous <Continuous>  Parenteral Nutrition - Adult 1 Each TPN Continuous <Continuous>  ursodiol Suspension 300 milliGRAM(s) Oral every 8 hours  venlafaxine XR. 150 milliGRAM(s) Oral daily    MEDICATIONS  (PRN):  chlorhexidine 0.12% Liquid 15 milliLiter(s) Swish and Spit two times a day PRN oral hygeine  hydrALAZINE Injectable 10 milliGRAM(s) IV Push every 6 hours PRN SBP >170  lidocaine 2% Viscous 10 milliLiter(s) Swish and Spit every 12 hours PRN tongue pain  LORazepam   Injectable 1 milliGRAM(s) IV Push <User Schedule> PRN Anxiety  ondansetron Injectable 4 milliGRAM(s) IV Push every 6 hours PRN Nausea and/or Vomiting        Daily Weight in k.3kg [19 Dec 2023]  Daily 93.3kg [11 Dec 2023]  Daily 93.3kg [08 Dec 2023]  Daily 93.3kg [6 Dec 2023]  Daily 94.2kg [2023]  Daily 94.4kg [2023]  Daily 94.2kg [2023]  Daily 94.2kg [2023]  Daily 94.2kg [2023]  Daily 94.2 [15 Nov 2023]  Daily 94.2kg [2023]  Daily 95.8kg [2023]  Daily 94.2kg [2023]  Daily 85.2kg [2023]  Daily 85.2kg [31 Oct 2023]  Daily 85.2kg [24 Oct 2023]  Daily 85.2kg [20 Oct 2023]  Daily 81.9kg [18 Oct 2023]  Daily 85.2kg [16 Oct 2023]  Daily   95.2kg [12 Oct 2023]   Daily 87.7kg [11 Oct 2023]  Daily 87.4kg [09 Oct 2023]  Daily 86.4kg [07 Oct 2023]  Daily 82.5kg [06 Oct 2023]  Daily 82.6kg [4 Oct 2023]   Daily 83.5kg [03 Oct 2023]  Daily 84.5kg [2 Oct 2023]  Daily 87.2kg [1 Oct 2023]  Daily 88.3kg [29 Sep 2023]  Daily 89.9kg [28 Sep 2023]  Daily 87.7kg [24 Sep 2023]  Daily 90.4kg [21 Sep 2023]  Daily 91.4kg [20 Sep 2023]  Daily 86.7kg [18 Sep 2023]  Daily 88.1kg [16 Sep 2023]  Daily 88.9kg [15 Sep 2023]   Daily 89.1 [14 Sep 2023]  Daily 92.9kg [6 Sep 2023]  Daily 91.8kg [27 Aug 2023]  Daily 101kg [9 Aug 2023]     Weight Change: weight trending down throughout admission, now appears to be trending back up. Recommend continue weekly/daily weights for trending & ensuring adequacy of nutrition provision    IBW: 86.4kg  % IBW: 108.0    Estimated energy needs:   Ideal body weight (86.4kg) used for calculations as pt >100% IBW and BMI <30 per [facility] Standards of Care. Needs estimated for age and adjusted for current clinical status, increased needs for post-op & open abd wound healing  Calories: 5211-1443 kcals based on 30-40 kcals/kg  Protein: 129.6-172.8 g protein based on 1.5-2.0g protein/kg + additional depending on outputs  *Fluid needs per team    Subjective:   78 M w/ Crohn's, AFib/Flutter s/p DCCVs on amiodarone, remote ileocectomy and open appendectomy. Admitted () for SBO vs Crohns flare, s/p NGT decompression and s/p lap TRE converted to open TRE, SBR x 3, left in discontinuity with abthera vac on (), RTOR for ileocolic resection, small bowel anastomosis, and abdominal wall closure on (), c/b fluid collection s/p IR aspiration of perihepatic fluid on (7/3), c/b wound dehiscence s/p RTOR exlap, washout, ileocolic resection with end ileostomy, blow hole colostomy, red rubber from ileostomy to small bowel anastomosis; vicryl bridging mesh on () transferred to SICU postoperatively for hemodynamic monitoring, with hospital course complicated by periods of AMS most recently on  and associated with oliguria, severe ELVI and hyponatremia, which resolved but stepped back up for to SICU on 9/10 for acute AMS, intermittent hypoglycemia, AFib with RVR. Percutaneous Cholecystostomy placed on  for enlarged GB, PCT capped 10/5 and uncapped 10/25 for hyperbilirubinemia, Right brachial DVT, left basillic and cephalic superficial thrombus on duplex , CT  with ALDEN ground glass opacity of unclear etiology, completed empiric 7day cefepime course , rising T-bili of unclear etilogy, now w resolved candida glabrata fungemia, ELVI improving.     Chart reviewed.   TPN via PICC remains primary means to nutrition. 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 Continues to receive lipids in TPN daily. Latest selenium 75 <L>, copper 12 <L> []. To increase selenium in TPN and provide copper daily, monitor & recheck. Total bili 6.4 <H>, direct bilirubin 4.1 <H>,  []. Pt continues to receive sublingual ergocalciferol in additional to weekly ergocalciferol supplementation. TPN reordered for tonight. RDN will continue to monitor, reassess, and intervene as appropriate.     Previous Nutrition Diagnosis:  1. Increased Nutrient Needs R/T physiological demands for nutrient AEB post-op wound healing  Active [ x  ]  Resolved [   ]  New:  2. Altered GI function R/T extensive GI history, insufficient bowel, high output fistula AEB supplemental PN requirement to meet nutrition needs    Goal:  Pt will meet at least 75% of protein & energy needs via most appropriate route for nutrition     Recommendations:  1. c/w TPN via PICC as primary means to nutrition - recommend 1.9L bag running over 14hrs (135ml/hr), providing 380g Dex, 120g AA, 50g SMOF lipids daily; provides 2272 kcals, 120g protein daily, GIR 4.86 mg/kg/min   - provides 26.3 kcals/kg, 20.7 non-protein kcals/kg, 1.4g protein/kg  - monitor weights & wound healing, adjust substrates as indicated  - fluid & lytes per team  - MVI, thiamine, vit C in bag  2. Increase selenium in TPN, provide copper daily  - monitor zinc levels and adjust prn  - recheck levels in 4 weeks  - c/w Vit D supplementation and recheck levels in 4 wks  3. Recommend weekly weights for trending/ensuring adequacy of nutrition provision  - please obtain new weight  4. PO diet per team discretion  -  c/w Regular diet as medically feasible to allow for more menu options  - c/w 1 LPS BID [200 kcals, 30g protein] + 1 Ensure Max daily [150 kcals, 30g protein] as amenable  - consider NPO for bowel rest as indicated   - ongoing education prn  5. Hydration per team  - risk for dehydration with high outputs, monitor  - additional fluids per team  - consider oral rehydration solution  6. Weekly lipid panel while on TPN  - hold/decrease lipids if TG >400  - continue to trend LFTs  7. Monitor BMP/Mg/Phos, POC BG while on TPN  - monitor & replenish lytes outside TPN bag prn  8. Continue close monitoring of clinical course & adjust recommendations prn    Education: ongoing diet education prn    Risk Level: High [  xx ] Moderate [   ] Low [   ]. Admitting Diagnosis:   Patient is a 78y old  Male who presents with a chief complaint of SBO (29 Dec 2023 08:33)      PAST MEDICAL & SURGICAL HISTORY:  Essential hypertension  Hypertension      Atrial fibrillation  s/p cardioversion  and   Pt. reports 4 DCCV  Now on Amiodarone 200mg PO bid and Eliquis 5mg PO bid  Last D[facility] yrs ago at [facility]      Crohn's disease  s/p partial resection of ileum      Hyperlipidemia      Hypothyroidism      History of depression  On Venlafaxine ER 150mg PO bid      Junctional rhythm      H/O knee surgery      History of cataract surgery          Current Nutrition Order:   TPN via PICC- 1.9L bag running over 14hrs, providing 380g Dex, 120g AA, 50g SMOF lipids daily; provides 2272 kcals, 120g protein daily, GIR 4.86 mg/kg/min   PO- Regular diet + 2 LPS per day [provide 100 kcals, 15g protein each], + Ensure Max daily [150 kcals, 20g protein]    PO Intake: Good (%) [   ]  Fair (50-75%) [ x ] Poor (<25%) [   ]    GI Issues:   1/3/24:  ileostomy output 0cc x 24hrs, abdominal wound/fistula output 2550cc x 24hrs, per cony output 275cc x 24hrs  : ileostomy output 50cc x 24hrs, abdominal wound/fistula output 910cc x 24hrs, per cony output 1225cc x 24hrs  : ileostomy output 50cc x 24hrs, abdominal wound/fistula output 1175cc x 24hrs, per cony output 1050cc x 24hrs  : ileostomy output 50cc x 24hrs, abdominal wound/fistula output 1100cc x 24hrs, per cony output 1050cc x 24hrs     : ileostomy output 20cc x 24hrs, abdominal wound/fistula output 1275cc x 24hrs, per cony output 775cc x 24hrs    : ileostomy output 15cc x 24hrs, abdominal wound/fistula output  1230cc x 24hrs, per cony output 1010cc x 24hrs    : ileostomy output 75cc x 24hrs, abdominal wound/fistula output  600cc x 24hrs, per cony output 1125cc x 24hrs    : ileostomy output 35cc x 24hrs, abdominal wound/fistula output  250cc x 24hrs, per cony output 745cc x 24hrs    : ileostomy output 30cc x 24hrs, abdominal wound/fistula output  2400cc x 24hrs, per cony output 550cc x 24hrs   : ileostomy output 50 cc x 24hrs, abdominal wound/fistula output 2175 cc x 24hrs, per cony output 530 cc x 24hrs   : ileostomy output 25cc x 24hrs, abdominal wound/fistula output 2350 cc x 24hrs, per cony output 775cc x 24hrs   : ileostomy output 55cc x 24hrs, abdominal wound/fistula output 2025 cc x 24hrs, per cony output 450cc x 24hrs   11/15:  ileostomy output 65cc x 24hrs, abdominal wound/fistula output 875cc x 24hrs, per cony output 775cc x 24hrs    Pain: no pain/discomfort noted    Skin Integrity: lower back wound/healed, jaundice, abd wound and fistula with wound manager in place, RLQ ileostomy, perc cony drain    Labs:       135  |  102  |  42<H>  ----------------------------<  139<H>  4.4   |  26  |  1.47<H>    Ca    8.3<L>      2024 06:14  Phos  3.1       Mg     2.0         TPro  8.3  /  Alb  2.2<L>  /  TBili  6.6<H>  /  DBili  4.2<H>  /  AST  94<H>  /  ALT  66<H>  /  AlkPhos  123<H>          Medications:  MEDICATIONS  (STANDING):  chlorhexidine 2% Cloths 1 Application(s) Topical <User Schedule>  cholestyramine Powder (Sugar-Free) 4 Gram(s) Oral daily  epoetin matt-epbx (RETACRIT) Injectable 47760 Unit(s) SubCutaneous every 7 days  ergocalciferol 94581 Unit(s) Oral <User Schedule>  gabapentin 100 milliGRAM(s) Oral at bedtime  glucagon  Injectable 1 milliGRAM(s) IntraMuscular once  heparin   Injectable 5000 Unit(s) SubCutaneous every 12 hours  lactated ringers Bolus 500 milliLiter(s) IV Bolus once  levothyroxine Injectable 30 MICROGram(s) IV Push at bedtime  lipid, fat emulsion (Fish Oil and Plant Based) 20% Infusion 0.54 Gm/kG/Day (20.83 mL/Hr) IV Continuous <Continuous>  metoprolol tartrate Injectable 7.5 milliGRAM(s) IV Push every 4 hours  Parenteral Nutrition - Adult 1 Each TPN Continuous <Continuous>  Parenteral Nutrition - Adult 1 Each TPN Continuous <Continuous>  ursodiol Suspension 300 milliGRAM(s) Oral every 8 hours  venlafaxine XR. 150 milliGRAM(s) Oral daily    MEDICATIONS  (PRN):  chlorhexidine 0.12% Liquid 15 milliLiter(s) Swish and Spit two times a day PRN oral hygeine  hydrALAZINE Injectable 10 milliGRAM(s) IV Push every 6 hours PRN SBP >170  lidocaine 2% Viscous 10 milliLiter(s) Swish and Spit every 12 hours PRN tongue pain  LORazepam   Injectable 1 milliGRAM(s) IV Push <User Schedule> PRN Anxiety  ondansetron Injectable 4 milliGRAM(s) IV Push every 6 hours PRN Nausea and/or Vomiting        Daily Weight in k.3kg [19 Dec 2023]  Daily 93.3kg [11 Dec 2023]  Daily 93.3kg [08 Dec 2023]  Daily 93.3kg [6 Dec 2023]  Daily 94.2kg [2023]  Daily 94.4kg [2023]  Daily 94.2kg [2023]  Daily 94.2kg [2023]  Daily 94.2kg [2023]  Daily 94.2 [15 Nov 2023]  Daily 94.2kg [2023]  Daily 95.8kg [2023]  Daily 94.2kg [2023]  Daily 85.2kg [2023]  Daily 85.2kg [31 Oct 2023]  Daily 85.2kg [24 Oct 2023]  Daily 85.2kg [20 Oct 2023]  Daily 81.9kg [18 Oct 2023]  Daily 85.2kg [16 Oct 2023]  Daily   95.2kg [12 Oct 2023]   Daily 87.7kg [11 Oct 2023]  Daily 87.4kg [09 Oct 2023]  Daily 86.4kg [07 Oct 2023]  Daily 82.5kg [06 Oct 2023]  Daily 82.6kg [4 Oct 2023]   Daily 83.5kg [03 Oct 2023]  Daily 84.5kg [2 Oct 2023]  Daily 87.2kg [1 Oct 2023]  Daily 88.3kg [29 Sep 2023]  Daily 89.9kg [28 Sep 2023]  Daily 87.7kg [24 Sep 2023]  Daily 90.4kg [21 Sep 2023]  Daily 91.4kg [20 Sep 2023]  Daily 86.7kg [18 Sep 2023]  Daily 88.1kg [16 Sep 2023]  Daily 88.9kg [15 Sep 2023]   Daily 89.1 [14 Sep 2023]  Daily 92.9kg [6 Sep 2023]  Daily 91.8kg [27 Aug 2023]  Daily 101kg [9 Aug 2023]     Weight Change: weight trending down throughout admission, now appears to be trending back up. Recommend continue weekly/daily weights for trending & ensuring adequacy of nutrition provision    IBW: 86.4kg  % IBW: 108.0    Estimated energy needs:   Ideal body weight (86.4kg) used for calculations as pt >100% IBW and BMI <30 per [facility] Standards of Care. Needs estimated for age and adjusted for current clinical status, increased needs for post-op & open abd wound healing  Calories: 4637-0349 kcals based on 30-40 kcals/kg  Protein: 129.6-172.8 g protein based on 1.5-2.0g protein/kg + additional depending on outputs  *Fluid needs per team    Subjective:   78 M w/ Crohn's, AFib/Flutter s/p DCCVs on amiodarone, remote ileocectomy and open appendectomy. Admitted () for SBO vs Crohns flare, s/p NGT decompression and s/p lap TRE converted to open TRE, SBR x 3, left in discontinuity with abthera vac on (), RTOR for ileocolic resection, small bowel anastomosis, and abdominal wall closure on (), c/b fluid collection s/p IR aspiration of perihepatic fluid on (7/3), c/b wound dehiscence s/p RTOR exlap, washout, ileocolic resection with end ileostomy, blow hole colostomy, red rubber from ileostomy to small bowel anastomosis; vicryl bridging mesh on () transferred to SICU postoperatively for hemodynamic monitoring, with hospital course complicated by periods of AMS most recently on  and associated with oliguria, severe ELVI and hyponatremia, which resolved but stepped back up for to SICU on 9/10 for acute AMS, intermittent hypoglycemia, AFib with RVR. Percutaneous Cholecystostomy placed on  for enlarged GB, PCT capped 10/5 and uncapped 10/25 for hyperbilirubinemia, Right brachial DVT, left basillic and cephalic superficial thrombus on duplex , CT  with ALDEN ground glass opacity of unclear etiology, completed empiric 7day cefepime course , rising T-bili of unclear etilogy, now w resolved candida glabrata fungemia, ELVI improving.     TPN via PICC remains primary means to nutrition. 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 Continues to receive lipids in TPN daily. Latest selenium 75 <L>, copper 12 <L> []. To increase selenium in TPN and provide copper daily, monitor & recheck. Total bili 6.4 <H>, direct bilirubin 4.1 <H>,  []. Pt continues to receive sublingual ergocalciferol in additional to weekly ergocalciferol supplementation. TPN reordered for tonight. RDN will continue to monitor, reassess, and intervene as appropriate.     Previous Nutrition Diagnosis:  1. Increased Nutrient Needs R/T physiological demands for nutrient AEB post-op wound healing  Active [ x  ]  Resolved [   ]  New:  2. Altered GI function R/T extensive GI history, insufficient bowel, high output fistula AEB supplemental PN requirement to meet nutrition needs    Goal:  Pt will meet at least 75% of protein & energy needs via most appropriate route for nutrition     Recommendations:  1. c/w TPN via PICC as primary means to nutrition - recommend 1.9L bag running over 14hrs (135ml/hr), providing 380g Dex, 120g AA, 50g SMOF lipids daily; provides 2272 kcals, 120g protein daily, GIR 4.86 mg/kg/min   - provides 26.3 kcals/kg, 20.7 non-protein kcals/kg, 1.4g protein/kg  - monitor weights & wound healing, adjust substrates as indicated  - fluid & lytes per team  - MVI, thiamine, vit C in bag  2. Increase selenium in TPN, provide copper daily  - monitor zinc levels and adjust prn  - recheck levels in 4 weeks  - c/w Vit D supplementation and recheck levels in 4 wks  3. Recommend weekly weights for trending/ensuring adequacy of nutrition provision  - please obtain new weight  4. PO diet per team discretion  -  c/w Regular diet as medically feasible to allow for more menu options  - c/w 1 LPS BID [200 kcals, 30g protein] + 1 Ensure Max daily [150 kcals, 30g protein] as amenable  - consider NPO for bowel rest as indicated   - ongoing education prn  5. Hydration per team  - risk for dehydration with high outputs, monitor  - additional fluids per team  - consider oral rehydration solution  6. Weekly lipid panel while on TPN  - hold/decrease lipids if TG >400  - continue to trend LFTs  7. Monitor BMP/Mg/Phos, POC BG while on TPN  - monitor & replenish lytes outside TPN bag prn  8. Continue close monitoring of clinical course & adjust recommendations prn    Education: ongoing diet education prn    Risk Level: High [  xx ] Moderate [   ] Low [   ]. Admitting Diagnosis:   Patient is a 78y old  Male who presents with a chief complaint of SBO (29 Dec 2023 08:33)      PAST MEDICAL & SURGICAL HISTORY:  Essential hypertension  Hypertension      Atrial fibrillation  s/p cardioversion  and   Pt. reports 4 DCCV  Now on Amiodarone 200mg PO bid and Eliquis 5mg PO bid  [facility] yrs [facility]      Crohn's disease  s/p partial resection of ileum      Hyperlipidemia      Hypothyroidism      History of depression  On Venlafaxine ER 150mg PO bid      Junctional rhythm      H/O knee surgery      History of cataract surgery          Current Nutrition Order:   TPN via PICC- 1.9L bag running over 14hrs, providing 380g Dex, 120g AA, 50g SMOF lipids daily; provides 2272 kcals, 120g protein daily, GIR 4.86 mg/kg/min   PO- Regular diet + 2 LPS per day [provide 100 kcals, 15g protein each], + Ensure Max daily [150 kcals, 20g protein]    PO Intake: Good (%) [   ]  Fair (50-75%) [ x ] Poor (<25%) [   ]    GI Issues:   1/3/24:  ileostomy output 0cc x 24hrs, abdominal wound/fistula output 2550cc x 24hrs, per cony output 275cc x 24hrs  : ileostomy output 50cc x 24hrs, abdominal wound/fistula output 910cc x 24hrs, per cony output 1225cc x 24hrs  : ileostomy output 50cc x 24hrs, abdominal wound/fistula output 1175cc x 24hrs, per cony output 1050cc x 24hrs  : ileostomy output 50cc x 24hrs, abdominal wound/fistula output 1100cc x 24hrs, per cony output 1050cc x 24hrs     : ileostomy output 20cc x 24hrs, abdominal wound/fistula output 1275cc x 24hrs, per cony output 775cc x 24hrs    : ileostomy output 15cc x 24hrs, abdominal wound/fistula output  1230cc x 24hrs, per cony output 1010cc x 24hrs    : ileostomy output 75cc x 24hrs, abdominal wound/fistula output  600cc x 24hrs, per cony output 1125cc x 24hrs    : ileostomy output 35cc x 24hrs, abdominal wound/fistula output  250cc x 24hrs, per cony output 745cc x 24hrs    : ileostomy output 30cc x 24hrs, abdominal wound/fistula output  2400cc x 24hrs, per cony output 550cc x 24hrs   : ileostomy output 50 cc x 24hrs, abdominal wound/fistula output 2175 cc x 24hrs, per cony output 530 cc x 24hrs   : ileostomy output 25cc x 24hrs, abdominal wound/fistula output 2350 cc x 24hrs, per cony output 775cc x 24hrs   : ileostomy output 55cc x 24hrs, abdominal wound/fistula output 2025 cc x 24hrs, per cony output 450cc x 24hrs   11/15:  ileostomy output 65cc x 24hrs, abdominal wound/fistula output 875cc x 24hrs, per cony output 775cc x 24hrs    Pain: no pain/discomfort noted    Skin Integrity: lower back wound/healed, jaundice, abd wound and fistula with wound manager in place, RLQ ileostomy, perc cony drain    Labs:       135  |  102  |  42<H>  ----------------------------<  139<H>  4.4   |  26  |  1.47<H>    Ca    8.3<L>      2024 06:14  Phos  3.1       Mg     2.0         TPro  8.3  /  Alb  2.2<L>  /  TBili  6.6<H>  /  DBili  4.2<H>  /  AST  94<H>  /  ALT  66<H>  /  AlkPhos  123<H>          Medications:  MEDICATIONS  (STANDING):  chlorhexidine 2% Cloths 1 Application(s) Topical <User Schedule>  cholestyramine Powder (Sugar-Free) 4 Gram(s) Oral daily  epoetin matt-epbx (RETACRIT) Injectable 60873 Unit(s) SubCutaneous every 7 days  ergocalciferol 35328 Unit(s) Oral <User Schedule>  gabapentin 100 milliGRAM(s) Oral at bedtime  glucagon  Injectable 1 milliGRAM(s) IntraMuscular once  heparin   Injectable 5000 Unit(s) SubCutaneous every 12 hours  lactated ringers Bolus 500 milliLiter(s) IV Bolus once  levothyroxine Injectable 30 MICROGram(s) IV Push at bedtime  lipid, fat emulsion (Fish Oil and Plant Based) 20% Infusion 0.54 Gm/kG/Day (20.83 mL/Hr) IV Continuous <Continuous>  metoprolol tartrate Injectable 7.5 milliGRAM(s) IV Push every 4 hours  Parenteral Nutrition - Adult 1 Each TPN Continuous <Continuous>  Parenteral Nutrition - Adult 1 Each TPN Continuous <Continuous>  ursodiol Suspension 300 milliGRAM(s) Oral every 8 hours  venlafaxine XR. 150 milliGRAM(s) Oral daily    MEDICATIONS  (PRN):  chlorhexidine 0.12% Liquid 15 milliLiter(s) Swish and Spit two times a day PRN oral hygeine  hydrALAZINE Injectable 10 milliGRAM(s) IV Push every 6 hours PRN SBP >170  lidocaine 2% Viscous 10 milliLiter(s) Swish and Spit every 12 hours PRN tongue pain  LORazepam   Injectable 1 milliGRAM(s) IV Push <User Schedule> PRN Anxiety  ondansetron Injectable 4 milliGRAM(s) IV Push every 6 hours PRN Nausea and/or Vomiting        Daily Weight in k.3kg [19 Dec 2023]  Daily 93.3kg [11 Dec 2023]  Daily 93.3kg [08 Dec 2023]  Daily 93.3kg [6 Dec 2023]  Daily 94.2kg [2023]  Daily 94.4kg [2023]  Daily 94.2kg [2023]  Daily 94.2kg [2023]  Daily 94.2kg [2023]  Daily 94.2 [15 Nov 2023]  Daily 94.2kg [2023]  Daily 95.8kg [2023]  Daily 94.2kg [2023]  Daily 85.2kg [2023]  Daily 85.2kg [31 Oct 2023]  Daily 85.2kg [24 Oct 2023]  Daily 85.2kg [20 Oct 2023]  Daily 81.9kg [18 Oct 2023]  Daily 85.2kg [16 Oct 2023]  Daily   95.2kg [12 Oct 2023]   Daily 87.7kg [11 Oct 2023]  Daily 87.4kg [09 Oct 2023]  Daily 86.4kg [07 Oct 2023]  Daily 82.5kg [06 Oct 2023]  Daily 82.6kg [4 Oct 2023]   Daily 83.5kg [03 Oct 2023]  Daily 84.5kg [2 Oct 2023]  Daily 87.2kg [1 Oct 2023]  Daily 88.3kg [29 Sep 2023]  Daily 89.9kg [28 Sep 2023]  Daily 87.7kg [24 Sep 2023]  Daily 90.4kg [21 Sep 2023]  Daily 91.4kg [20 Sep 2023]  Daily 86.7kg [18 Sep 2023]  Daily 88.1kg [16 Sep 2023]  Daily 88.9kg [15 Sep 2023]   Daily 89.1 [14 Sep 2023]  Daily 92.9kg [6 Sep 2023]  Daily 91.8kg [27 Aug 2023]  Daily 101kg [9 Aug 2023]     Weight Change: weight trending down throughout admission, now appears to be trending back up. Recommend continue weekly/daily weights for trending & ensuring adequacy of nutrition provision    IBW: 86.4kg  % IBW: 108.0    Estimated energy needs:   Ideal body weight (86.4kg) used for calculations as pt >100% IBW and BMI <30 per [facility] Standards of Care. Needs estimated for age and adjusted for current clinical status, increased needs for post-op & open abd wound healing  Calories: 3324-4060 kcals based on 30-40 kcals/kg  Protein: 129.6-172.8 g protein based on 1.5-2.0g protein/kg + additional depending on outputs  *Fluid needs per team    Subjective:   78 M w/ Crohn's, AFib/Flutter s/p DCCVs on amiodarone, remote ileocectomy and open appendectomy. Admitted () for SBO vs Crohns flare, s/p NGT decompression and s/p lap TRE converted to open TRE, SBR x 3, left in discontinuity with abthera vac on (), RTOR for ileocolic resection, small bowel anastomosis, and abdominal wall closure on (), c/b fluid collection s/p IR aspiration of perihepatic fluid on (7/3), c/b wound dehiscence s/p RTOR exlap, washout, ileocolic resection with end ileostomy, blow hole colostomy, red rubber from ileostomy to small bowel anastomosis; vicryl bridging mesh on () transferred to SICU postoperatively for hemodynamic monitoring, with hospital course complicated by periods of AMS most recently on  and associated with oliguria, severe ELVI and hyponatremia, which resolved but stepped back up for to SICU on 9/10 for acute AMS, intermittent hypoglycemia, AFib with RVR. Percutaneous Cholecystostomy placed on  for enlarged GB, PCT capped 10/5 and uncapped 10/25 for hyperbilirubinemia, Right brachial DVT, left basillic and cephalic superficial thrombus on duplex , CT  with ALDEN ground glass opacity of unclear etiology, completed empiric 7day cefepime course , rising T-bili of unclear etilogy, now w resolved candida glabrata fungemia, ELVI improving.     TPN via PICC remains primary means to nutrition. 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 Continues to receive lipids in TPN daily. Latest selenium 75 <L>, copper 12 <L> []. To increase selenium in TPN and provide copper daily, monitor & recheck. Total bili 6.4 <H>, direct bilirubin 4.1 <H>,  []. Pt continues to receive sublingual ergocalciferol in additional to weekly ergocalciferol supplementation. TPN reordered for tonight. RDN will continue to monitor, reassess, and intervene as appropriate.     Previous Nutrition Diagnosis:  1. Increased Nutrient Needs R/T physiological demands for nutrient AEB post-op wound healing  Active [ x  ]  Resolved [   ]  New:  2. Altered GI function R/T extensive GI history, insufficient bowel, high output fistula AEB supplemental PN requirement to meet nutrition needs    Goal:  Pt will meet at least 75% of protein & energy needs via most appropriate route for nutrition     Recommendations:  1. c/w TPN via PICC as primary means to nutrition - recommend 1.9L bag running over 14hrs (135ml/hr), providing 380g Dex, 120g AA, 50g SMOF lipids daily; provides 2272 kcals, 120g protein daily, GIR 4.86 mg/kg/min   - provides 26.3 kcals/kg, 20.7 non-protein kcals/kg, 1.4g protein/kg  - monitor weights & wound healing, adjust substrates as indicated  - fluid & lytes per team  - MVI, thiamine, vit C in bag  2. Increase selenium in TPN, provide copper daily  - monitor zinc levels and adjust prn  - recheck levels in 4 weeks  - c/w Vit D supplementation and recheck levels in 4 wks  3. Recommend weekly weights for trending/ensuring adequacy of nutrition provision  - please obtain new weight  4. PO diet per team discretion  -  c/w Regular diet as medically feasible to allow for more menu options  - c/w 1 LPS BID [200 kcals, 30g protein] + 1 Ensure Max daily [150 kcals, 30g protein] as amenable  - consider NPO for bowel rest as indicated   - ongoing education prn  5. Hydration per team  - risk for dehydration with high outputs, monitor  - additional fluids per team  - consider oral rehydration solution  6. Weekly lipid panel while on TPN  - hold/decrease lipids if TG >400  - continue to trend LFTs  7. Monitor BMP/Mg/Phos, POC BG while on TPN  - monitor & replenish lytes outside TPN bag prn  8. Continue close monitoring of clinical course & adjust recommendations prn    Education: ongoing diet education prn    Risk Level: High [  xx ] Moderate [   ] Low [   ].

## 2024-01-03 NOTE — PROGRESS NOTE ADULT - SUBJECTIVE AND OBJECTIVE BOX
STATUS POST:    6/27: Laparoscopic lysis of adhesions, converted to open lysis of adhesions, SBR x 3, temporary abdominal closure, 5L cryst, 1L 5% alb, 3 pRBC, 1 FFP, 1 plts  6/28: ex lap, removal of abthera, ileocolic resection, small bowel anastamosis, , 1.6 crystalloid, 250 5%, 175 uop   7/3: IR Gendel drained perihepatic aspiration of serous fluid.   7/5: RTOR exlap, washout, ileocolic resection with end ileostomy, blow hole colostomy, fistula, red rubber from ileostomy to small bowel anastomosis; vicryl bridging mesh; R JOYCE below ileostomy, L JOYCE at small bowel enterotomy repair; 500 LR, 500 5% albumin, 3u PRBC, 2 FFP, 400 UOP,   9/11: s/p perc cony    SUBJECTIVE: Pt seen and examined at bedside this am by surgery team. Patient is lying comfortably in bed with no complaints. Tolerating diet, pain well controlled with current regimen. Patient denies fever, nausea, vomiting, chest pain, and shortness of breath. No pouch leaks overnight.    MEDICATIONS  (STANDING):  chlorhexidine 2% Cloths 1 Application(s) Topical <User Schedule>  cholestyramine Powder (Sugar-Free) 4 Gram(s) Oral daily  epoetin matt-epbx (RETACRIT) Injectable 61020 Unit(s) SubCutaneous every 7 days  ergocalciferol 64300 Unit(s) Oral <User Schedule>  gabapentin 100 milliGRAM(s) Oral at bedtime  glucagon  Injectable 1 milliGRAM(s) IntraMuscular once  heparin   Injectable 5000 Unit(s) SubCutaneous every 12 hours  levothyroxine Injectable 30 MICROGram(s) IV Push at bedtime  lipid, fat emulsion (Fish Oil and Plant Based) 20% Infusion 0.54 Gm/kG/Day (20.83 mL/Hr) IV Continuous <Continuous>  metoprolol tartrate Injectable 7.5 milliGRAM(s) IV Push every 6 hours  Parenteral Nutrition - Adult 1 Each TPN Continuous <Continuous>  Parenteral Nutrition - Adult 1 Each TPN Continuous <Continuous>  ursodiol Suspension 300 milliGRAM(s) Oral every 8 hours  venlafaxine XR. 150 milliGRAM(s) Oral daily    MEDICATIONS  (PRN):  chlorhexidine 0.12% Liquid 15 milliLiter(s) Swish and Spit two times a day PRN oral hygeine  hydrALAZINE Injectable 10 milliGRAM(s) IV Push every 6 hours PRN SBP >170  lidocaine 2% Viscous 10 milliLiter(s) Swish and Spit every 12 hours PRN tongue pain  LORazepam   Injectable 1 milliGRAM(s) IV Push <User Schedule> PRN Anxiety  ondansetron Injectable 4 milliGRAM(s) IV Push every 6 hours PRN Nausea and/or Vomiting      Vital Signs Last 24 Hrs  T(C): 36.9 (03 Jan 2024 13:17), Max: 37.3 (03 Jan 2024 01:06)  T(F): 98.5 (03 Jan 2024 13:17), Max: 99.1 (03 Jan 2024 01:06)  HR: 106 (03 Jan 2024 14:00) (97 - 107)  BP: 145/71 (03 Jan 2024 12:00) (126/61 - 151/63)  BP(mean): 101 (03 Jan 2024 12:00) (85 - 101)  RR: 25 (03 Jan 2024 14:00) (20 - 25)  SpO2: 97% (03 Jan 2024 14:00) (92% - 99%)    Parameters below as of 03 Jan 2024 14:00  Patient On (Oxygen Delivery Method): room air        Physical Exam  General: Patient is doing well and lying in bed comfortably  Constitutional: alert and oriented   Pulm: Nonlabored breathing, no respiratory distress  CV: Regular rate and rhythm, normal sinus rhythm  Abd: soft, nontender, nondistended. No rebound, no guarding. Wound manager in place without leaks, no further bleeding of wound bed. Perc cony capped Ileostomy with minimal output.   Extremities: warm, well perfused, no edema    I&O's Detail    02 Jan 2024 07:01  -  03 Jan 2024 07:00  --------------------------------------------------------  IN:    Fat Emulsion (Fish Oil &amp; Plant Based) 20% Infusion: 249.6 mL    IV PiggyBack: 500 mL    Oral Fluid: 1200 mL    TPN (Total Parenteral Nutrition): 1778 mL  Total IN: 3727.6 mL    OUT:    Drain (mL): 175 mL    Drain (mL): 2550 mL    Voided (mL): 1900 mL  Total OUT: 4625 mL    Total NET: -897.4 mL      03 Jan 2024 07:01  -  03 Jan 2024 14:27  --------------------------------------------------------  IN:    TPN (Total Parenteral Nutrition): 276 mL  Total IN: 276 mL    OUT:    Drain (mL): 450 mL    Voided (mL): 625 mL  Total OUT: 1075 mL    Total NET: -799 mL        LABS:                        8.5    7.31  )-----------( 170      ( 03 Jan 2024 05:29 )             26.1     01-03    135  |  102  |  42<H>  ----------------------------<  139<H>  4.4   |  26  |  1.47<H>    Ca    8.3<L>      03 Jan 2024 06:14  Phos  3.1     01-03  Mg     2.0     01-03    TPro  8.3  /  Alb  2.2<L>  /  TBili  6.6<H>  /  DBili  4.2<H>  /  AST  94<H>  /  ALT  66<H>  /  AlkPhos  123<H>  01-03    PT/INR - ( 03 Jan 2024 05:29 )   PT: 15.5 sec;   INR: 1.37            Urinalysis Basic - ( 03 Jan 2024 06:14 )    Color: x / Appearance: x / SG: x / pH: x  Gluc: 139 mg/dL / Ketone: x  / Bili: x / Urobili: x   Blood: x / Protein: x / Nitrite: x   Leuk Esterase: x / RBC: x / WBC x   Sq Epi: x / Non Sq Epi: x / Bacteria: x     STATUS POST:    6/27: Laparoscopic lysis of adhesions, converted to open lysis of adhesions, SBR x 3, temporary abdominal closure, 5L cryst, 1L 5% alb, 3 pRBC, 1 FFP, 1 plts  6/28: ex lap, removal of abthera, ileocolic resection, small bowel anastamosis, , 1.6 crystalloid, 250 5%, 175 uop   7/3: IR Gendel drained perihepatic aspiration of serous fluid.   7/5: RTOR exlap, washout, ileocolic resection with end ileostomy, blow hole colostomy, fistula, red rubber from ileostomy to small bowel anastomosis; vicryl bridging mesh; R JOYCE below ileostomy, L JOYCE at small bowel enterotomy repair; 500 LR, 500 5% albumin, 3u PRBC, 2 FFP, 400 UOP,   9/11: s/p perc cony    SUBJECTIVE: Pt seen and examined at bedside this am by surgery team. Patient is lying comfortably in bed with no complaints. Tolerating diet, pain well controlled with current regimen. Patient denies fever, nausea, vomiting, chest pain, and shortness of breath. No pouch leaks overnight.    MEDICATIONS  (STANDING):  chlorhexidine 2% Cloths 1 Application(s) Topical <User Schedule>  cholestyramine Powder (Sugar-Free) 4 Gram(s) Oral daily  epoetin matt-epbx (RETACRIT) Injectable 33964 Unit(s) SubCutaneous every 7 days  ergocalciferol 30030 Unit(s) Oral <User Schedule>  gabapentin 100 milliGRAM(s) Oral at bedtime  glucagon  Injectable 1 milliGRAM(s) IntraMuscular once  heparin   Injectable 5000 Unit(s) SubCutaneous every 12 hours  levothyroxine Injectable 30 MICROGram(s) IV Push at bedtime  lipid, fat emulsion (Fish Oil and Plant Based) 20% Infusion 0.54 Gm/kG/Day (20.83 mL/Hr) IV Continuous <Continuous>  metoprolol tartrate Injectable 7.5 milliGRAM(s) IV Push every 6 hours  Parenteral Nutrition - Adult 1 Each TPN Continuous <Continuous>  Parenteral Nutrition - Adult 1 Each TPN Continuous <Continuous>  ursodiol Suspension 300 milliGRAM(s) Oral every 8 hours  venlafaxine XR. 150 milliGRAM(s) Oral daily    MEDICATIONS  (PRN):  chlorhexidine 0.12% Liquid 15 milliLiter(s) Swish and Spit two times a day PRN oral hygeine  hydrALAZINE Injectable 10 milliGRAM(s) IV Push every 6 hours PRN SBP >170  lidocaine 2% Viscous 10 milliLiter(s) Swish and Spit every 12 hours PRN tongue pain  LORazepam   Injectable 1 milliGRAM(s) IV Push <User Schedule> PRN Anxiety  ondansetron Injectable 4 milliGRAM(s) IV Push every 6 hours PRN Nausea and/or Vomiting      Vital Signs Last 24 Hrs  T(C): 36.9 (03 Jan 2024 13:17), Max: 37.3 (03 Jan 2024 01:06)  T(F): 98.5 (03 Jan 2024 13:17), Max: 99.1 (03 Jan 2024 01:06)  HR: 106 (03 Jan 2024 14:00) (97 - 107)  BP: 145/71 (03 Jan 2024 12:00) (126/61 - 151/63)  BP(mean): 101 (03 Jan 2024 12:00) (85 - 101)  RR: 25 (03 Jan 2024 14:00) (20 - 25)  SpO2: 97% (03 Jan 2024 14:00) (92% - 99%)    Parameters below as of 03 Jan 2024 14:00  Patient On (Oxygen Delivery Method): room air        Physical Exam  General: Patient is doing well and lying in bed comfortably  Constitutional: alert and oriented   Pulm: Nonlabored breathing, no respiratory distress  CV: Regular rate and rhythm, normal sinus rhythm  Abd: soft, nontender, nondistended. No rebound, no guarding. Wound manager in place without leaks, no further bleeding of wound bed. Perc cony capped Ileostomy with minimal output.   Extremities: warm, well perfused, no edema    I&O's Detail    02 Jan 2024 07:01  -  03 Jan 2024 07:00  --------------------------------------------------------  IN:    Fat Emulsion (Fish Oil &amp; Plant Based) 20% Infusion: 249.6 mL    IV PiggyBack: 500 mL    Oral Fluid: 1200 mL    TPN (Total Parenteral Nutrition): 1778 mL  Total IN: 3727.6 mL    OUT:    Drain (mL): 175 mL    Drain (mL): 2550 mL    Voided (mL): 1900 mL  Total OUT: 4625 mL    Total NET: -897.4 mL      03 Jan 2024 07:01  -  03 Jan 2024 14:27  --------------------------------------------------------  IN:    TPN (Total Parenteral Nutrition): 276 mL  Total IN: 276 mL    OUT:    Drain (mL): 450 mL    Voided (mL): 625 mL  Total OUT: 1075 mL    Total NET: -799 mL        LABS:                        8.5    7.31  )-----------( 170      ( 03 Jan 2024 05:29 )             26.1     01-03    135  |  102  |  42<H>  ----------------------------<  139<H>  4.4   |  26  |  1.47<H>    Ca    8.3<L>      03 Jan 2024 06:14  Phos  3.1     01-03  Mg     2.0     01-03    TPro  8.3  /  Alb  2.2<L>  /  TBili  6.6<H>  /  DBili  4.2<H>  /  AST  94<H>  /  ALT  66<H>  /  AlkPhos  123<H>  01-03    PT/INR - ( 03 Jan 2024 05:29 )   PT: 15.5 sec;   INR: 1.37            Urinalysis Basic - ( 03 Jan 2024 06:14 )    Color: x / Appearance: x / SG: x / pH: x  Gluc: 139 mg/dL / Ketone: x  / Bili: x / Urobili: x   Blood: x / Protein: x / Nitrite: x   Leuk Esterase: x / RBC: x / WBC x   Sq Epi: x / Non Sq Epi: x / Bacteria: x

## 2024-01-03 NOTE — PROGRESS NOTE ADULT - ASSESSMENT
78M w PMH Crohn's, AFib/Flutter s/p DCCVs on amiodarone, remote ileocectomy and open appendectomy. Admitted (6/23) for SBO vs Crohns flare, s/p NGT decompression and s/p lap converted to open TRE, SBR x 3, left in discontinuity with abthera vac on (6/27), RTOR for ileocolic resection, small bowel anastomosis, and abdominal wall closure on (6/28), c/b fluid collection s/p IR aspiration of perihepatic fluid on (7/3), c/b wound dehiscence s/p RTOR exlap, washout, ileocolic resection with end ileostomy, blow hole colostomy, red rubber from ileostomy to small bowel anastomosis; vicryl bridging mesh on (7/5) transferred to SICU postoperatively for hemodynamic monitoring, with hospital course complicated by periods of severe ELVI and hyponatremia, which resolved but stepped back up for to SICU on (9/10) for acute AMS, intermittent hypoglycemia, AFib with RVR. Percutaneous Cholecystostomy placed on (9/11) for enlarged GB, PCT capped 10/5 and uncapped 10/25 for hyperbilirubinemia, Right brachial DVT, left basillic and cephalic superficial thrombus on duplex 11/2, CT 11/14 with ALDEN ground glass opacity of unclear etiology, completed empiric 7day cefepime course 11/22, rising T-bili of unclear etilogy, now w resolved candida glabrata fungemia, ELVI improving.     NEURO: Hx of depression: cont Effexor (halved dose in setting of renal dysfunx). Anxiety: Lorazepam PRN. Holistic consult for anxiety and insomnia.   CV: Hx Afib/flutter: s/p DCCV, off Amio and lipitor due to persistent transaminitis, started metoprolol (1/2/24) IV 5q6h- upgraded to 7.5mg q6h 1/3/23. TTE  (7/18) - PASP 64mmHg, EF 65-70%. holding Losartan & norvasc because not absorbing PO meds, hydralazine PRN. Change labetalol to metoprolol for rate control   PULM: Atelectasis/dyspnea improved clinically: cont 1 hr of BiPAP every 12hrs of nasal canula or room air. Encourage OOB and IS.  CT from 11/14 with ALDEN ground glass opacity of unclear etiology. COVID negative. RVP negative. completed 7d empiric cefepime 11/22 to cover for potential PNA.   GI/FEN: Low res, low fat, high protein diet. Cont Ensure max x1/day. Folate, thiamine. PICC replaced 12/4, will switch out PICC on 1/4. continue TPN/lipids. High output EC fistula: cont Octreotide & Cholestyramine 10/18. Transaminitis elevated T bili of unclear origin, s/p Perc Deb on 9/11, failed capping trial; seen by Hepatology - MRCP 10/30 no obstruction, cont ursodiol, RUQ US 11/25 CBD 5mm, hepatic vessels patent  : Voids. ELVI improved: stopped famotidine given renal dysfunction. MARLO Duplex and RP US unremarkable, CK wnl.    ENDO: Hx hypothyroid: IV Synthroid, TSH low on 11/30, decreased synthroid dose, repeat TSH 12/6 wnl.  ID: currently not on any abx/antifungals. BCx 11/22 grew candida glabrata, likely CLABSI. s/p Caspo & completed Fluconazole course (12/1-12/9). Ophthalmology evaluated - normal ocular exam. Echo no vegetation. PICC replaced on 12/4. Cont Nystatin powder. // DC: caspofungin (11/24-12/1). empiric 7days cefepime (11/15-11/22), C. tertium, Lactobacillis from IR cx 7/3, and candida albicans, lactobacillus, vanc sensitive E faecium, vanc resistant E gallinarum, vanc resistant E casseliflavus, lactobacillus from OR cx 7/5. Completed course of abx with Imipenem (9/10-9/15). Imipenem (8/26--) imipenem (6/30-7/12, 7/23-7/30), Dapto (6/30-7/5 and 7/23-7/24). Empiric dapto (8/23-24) and cefepime (8/23-24).   PPX: SCDs, SQH, was on Therapeutic lovenox bid for R brachial vein DVT, but will hold off on anticoagulation since repeat US Duplex negative.  HEME: Anemia - continue Epogen weekly. S/p Iron Sucrose 200 qd x 3 days for chronic anemia. transfuse 1U PRBC for symptomatic anemia  LINES: PIVs, ZAHEER PICC (12/4 - ) will plan to switch PICC once a month (around 1/4) // DC: LUE PICC (9/1-11/1 ), RUE PICC: (11/1-11/24)   WOUNDS/DRAINS: Abdominal wound and fistula with wound manager in place dressing change Tuesday and Friday, had punctate bleeding at wound bed, s/p placed surgicell over - not bleeding at this time. RLQ Ostomy. IR perc deb tube CAPPED. // DC: L Clay drain.  PT: Ambulate as tolerated OOB to chair daily.  DISPO: SICU due to complicated hospital course.

## 2024-01-03 NOTE — PROGRESS NOTE ADULT - ASSESSMENT
The patient is a 78 year old male with a PMHx of Crohn's, AFib/Flutter s/p DCCVs on amiodarone, remote ileocectomy and open appendectomy. Admitted (6/23) for SBO vs Crohns flare, s/p NGT decompression and s/p lap converted to open TRE, SBR x 3, left in discontinuity with abthera vac on (6/27), RTOR for ileocolic resection, small bowel anastomosis, and abdominal wall closure on (6/28), c/b fluid collection s/p IR aspiration of perihepatic fluid on (7/3), c/b wound dehiscence s/p RTOR exlap, washout, ileocolic resection with end ileostomy, blow hole colostomy, red rubber from ileostomy to small bowel anastomosis; vicryl bridging mesh on (7/5) transferred to SICU postoperatively for hemodynamic monitoring, with hospital course complicated by periods of severe ELVI and hyponatremia, which resolved but stepped back up for to SICU on (9/10) for acute AMS, intermittent hypoglycemia, AFib with RVR. Percutaneous Cholecystostomy placed on (9/11) for enlarged GB, PCT capped 10/5 and uncapped 10/25 for hyperbilirubinemia, Right brachial DVT, left basillic and cephalic superficial thrombus on duplex 11/2, CT 11/14 with ALDEN ground glass opacity of unclear etiology, completed empiric 7day cefepime course 11/22, rising T-bili of unclear etiology now w resolved candida glabrata fungemia, ELIV improving.     Reg Diet  Pain/nausea control prn  SQH bid, SCDs/OOBA/IS  BiPAP q12h prn for atelectasis  Wound manager changes daily this week, then Tues/Fri  C/W octreotide, cholestyramine  C/W Epogen weekly  Rest of care per SICU   The patient is a 78 year old male with a PMHx of Crohn's, AFib/Flutter s/p DCCVs on amiodarone, remote ileocectomy and open appendectomy. Admitted (6/23) for SBO vs Crohns flare, s/p NGT decompression and s/p lap converted to open TRE, SBR x 3, left in discontinuity with abthera vac on (6/27), RTOR for ileocolic resection, small bowel anastomosis, and abdominal wall closure on (6/28), c/b fluid collection s/p IR aspiration of perihepatic fluid on (7/3), c/b wound dehiscence s/p RTOR exlap, washout, ileocolic resection with end ileostomy, blow hole colostomy, red rubber from ileostomy to small bowel anastomosis; vicryl bridging mesh on (7/5) transferred to SICU postoperatively for hemodynamic monitoring, with hospital course complicated by periods of severe ELVI and hyponatremia, which resolved but stepped back up for to SICU on (9/10) for acute AMS, intermittent hypoglycemia, AFib with RVR. Percutaneous Cholecystostomy placed on (9/11) for enlarged GB, PCT capped 10/5 and uncapped 10/25 for hyperbilirubinemia, Right brachial DVT, left basillic and cephalic superficial thrombus on duplex 11/2, CT 11/14 with ALDEN ground glass opacity of unclear etiology, completed empiric 7day cefepime course 11/22, rising T-bili of unclear etiology now w resolved candida glabrata fungemia, ELVI improving.     Reg Diet  Pain/nausea control prn  SQH bid, SCDs/OOBA/IS  BiPAP q12h prn for atelectasis  Wound manager changes daily this week, then Tues/Fri  C/W octreotide, cholestyramine  C/W Epogen weekly  Rest of care per SICU

## 2024-01-03 NOTE — PROGRESS NOTE ADULT - SUBJECTIVE AND OBJECTIVE BOX
ON: Zinc level 120. IV placed in left hand. Stoma output now green/brown. net negative 975cc, given 1L NS.     SUBJECTIVE:  This morning, he feels well; his pain is well-controlled. Mildly tachycardic (100-110s). No nausea or vomiting, no abdominal pain endorsed. No acute complaints.  Percutaneous cholecystostomy tube clamped yesterday, interval increase in EC fistula output - (as expected)   Tmax 99- afebrile     MEDICATIONS  (STANDING):  chlorhexidine 2% Cloths 1 Application(s) Topical <User Schedule>  cholestyramine Powder (Sugar-Free) 4 Gram(s) Oral daily  epoetin matt-epbx (RETACRIT) Injectable 38588 Unit(s) SubCutaneous every 7 days  ergocalciferol 64360 Unit(s) Oral <User Schedule>  gabapentin 100 milliGRAM(s) Oral at bedtime  glucagon  Injectable 1 milliGRAM(s) IntraMuscular once  heparin   Injectable 5000 Unit(s) SubCutaneous every 12 hours  levothyroxine Injectable 30 MICROGram(s) IV Push at bedtime  lipid, fat emulsion (Fish Oil and Plant Based) 20% Infusion 0.54 Gm/kG/Day (20.83 mL/Hr) IV Continuous <Continuous>  lipid, fat emulsion (Fish Oil and Plant Based) 20% Infusion 0.54 Gm/kG/Day (20.83 mL/Hr) IV Continuous <Continuous>  metoprolol tartrate Injectable 7.5 milliGRAM(s) IV Push every 6 hours  Parenteral Nutrition - Adult 1 Each TPN Continuous <Continuous>  Parenteral Nutrition - Adult 1 Each TPN Continuous <Continuous>  ursodiol Suspension 300 milliGRAM(s) Oral every 8 hours  venlafaxine XR. 150 milliGRAM(s) Oral daily    MEDICATIONS  (PRN):  chlorhexidine 0.12% Liquid 15 milliLiter(s) Swish and Spit two times a day PRN oral hygeine  hydrALAZINE Injectable 10 milliGRAM(s) IV Push every 6 hours PRN SBP >170  lidocaine 2% Viscous 10 milliLiter(s) Swish and Spit every 12 hours PRN tongue pain  LORazepam   Injectable 1 milliGRAM(s) IV Push <User Schedule> PRN Anxiety  ondansetron Injectable 4 milliGRAM(s) IV Push every 6 hours PRN Nausea and/or Vomiting    Drips:     ICU Vital Signs Last 24 Hrs  T(C): 37.2 (03 Jan 2024 05:04), Max: 37.3 (03 Jan 2024 01:06)  T(F): 99 (03 Jan 2024 05:04), Max: 99.1 (03 Jan 2024 01:06)  HR: 107 (03 Jan 2024 10:00) (97 - 107)  BP: 151/63 (03 Jan 2024 10:00) (126/61 - 151/63)  BP(mean): 91 (03 Jan 2024 10:00) (85 - 99)  ABP: --  ABP(mean): --  RR: 21 (03 Jan 2024 10:00) (20 - 29)  SpO2: 99% (03 Jan 2024 10:00) (91% - 99%)    O2 Parameters below as of 03 Jan 2024 10:00  Patient On (Oxygen Delivery Method): room air    PHYSICAL EXAM:   General: NAD   Neuro: AOx3   ENT: mucus membrane moist, generalized jaudice, scleral icterus   Cardiovascular: sinus tachycardia, no murmurs   Respiratory: CTAB  Gastrointestinal: soft, nontender and nondistended. No guarding or rebound. ECF pouch wound manager in place. PCT clamped. Wound bed in midline with healthy granulating tissue, punctate bleeding appreciated when evaluated.   Extremities: warm, no dependent edema  Vascular: no cyanosis/erythema  Skin: no rashes  MSK: no joint swelling.     Lines/tubes/drains:  [place]:	      Vent settings:  I&O's Summary    02 Jan 2024 07:01  -  03 Jan 2024 07:00  --------------------------------------------------------  IN: 3727.6 mL / OUT: 4625 mL / NET: -897.4 mL    LABS:                        8.5    7.31  )-----------( 170      ( 03 Jan 2024 05:29 )             26.1     01-03    135  |  102  |  42<H>  ----------------------------<  139<H>  4.4   |  26  |  1.47<H>    Ca    8.3<L>      03 Jan 2024 06:14  Phos  3.1     01-03  Mg     2.0     01-03    TPro  8.3  /  Alb  2.2<L>  /  TBili  6.6<H>  /  DBili  4.2<H>  /  AST  94<H>  /  ALT  66<H>  /  AlkPhos  123<H>  01-03    PT/INR - ( 03 Jan 2024 05:29 )   PT: 15.5 sec;   INR: 1.37            Urinalysis Basic - ( 03 Jan 2024 06:14 )    Color: x / Appearance: x / SG: x / pH: x  Gluc: 139 mg/dL / Ketone: x  / Bili: x / Urobili: x   Blood: x / Protein: x / Nitrite: x   Leuk Esterase: x / RBC: x / WBC x   Sq Epi: x / Non Sq Epi: x / Bacteria: x      CAPILLARY BLOOD GLUCOSE        LIVER FUNCTIONS - ( 03 Jan 2024 06:14 )  Alb: 2.2 g/dL / Pro: 8.3 g/dL / ALK PHOS: 123 U/L / ALT: 66 U/L / AST: 94 U/L / GGT: x             Cultures:    RADIOLOGY & ADDITIONAL STUDIES:     ON: Zinc level 120. IV placed in left hand. Stoma output now green/brown. net negative 975cc, given 1L NS.     SUBJECTIVE:  This morning, he feels well; his pain is well-controlled. Mildly tachycardic (100-110s). No nausea or vomiting, no abdominal pain endorsed. No acute complaints.  Percutaneous cholecystostomy tube clamped yesterday, interval increase in EC fistula output - (as expected)   Tmax 99- afebrile     MEDICATIONS  (STANDING):  chlorhexidine 2% Cloths 1 Application(s) Topical <User Schedule>  cholestyramine Powder (Sugar-Free) 4 Gram(s) Oral daily  epoetin matt-epbx (RETACRIT) Injectable 32731 Unit(s) SubCutaneous every 7 days  ergocalciferol 64377 Unit(s) Oral <User Schedule>  gabapentin 100 milliGRAM(s) Oral at bedtime  glucagon  Injectable 1 milliGRAM(s) IntraMuscular once  heparin   Injectable 5000 Unit(s) SubCutaneous every 12 hours  levothyroxine Injectable 30 MICROGram(s) IV Push at bedtime  lipid, fat emulsion (Fish Oil and Plant Based) 20% Infusion 0.54 Gm/kG/Day (20.83 mL/Hr) IV Continuous <Continuous>  lipid, fat emulsion (Fish Oil and Plant Based) 20% Infusion 0.54 Gm/kG/Day (20.83 mL/Hr) IV Continuous <Continuous>  metoprolol tartrate Injectable 7.5 milliGRAM(s) IV Push every 6 hours  Parenteral Nutrition - Adult 1 Each TPN Continuous <Continuous>  Parenteral Nutrition - Adult 1 Each TPN Continuous <Continuous>  ursodiol Suspension 300 milliGRAM(s) Oral every 8 hours  venlafaxine XR. 150 milliGRAM(s) Oral daily    MEDICATIONS  (PRN):  chlorhexidine 0.12% Liquid 15 milliLiter(s) Swish and Spit two times a day PRN oral hygeine  hydrALAZINE Injectable 10 milliGRAM(s) IV Push every 6 hours PRN SBP >170  lidocaine 2% Viscous 10 milliLiter(s) Swish and Spit every 12 hours PRN tongue pain  LORazepam   Injectable 1 milliGRAM(s) IV Push <User Schedule> PRN Anxiety  ondansetron Injectable 4 milliGRAM(s) IV Push every 6 hours PRN Nausea and/or Vomiting    Drips:     ICU Vital Signs Last 24 Hrs  T(C): 37.2 (03 Jan 2024 05:04), Max: 37.3 (03 Jan 2024 01:06)  T(F): 99 (03 Jan 2024 05:04), Max: 99.1 (03 Jan 2024 01:06)  HR: 107 (03 Jan 2024 10:00) (97 - 107)  BP: 151/63 (03 Jan 2024 10:00) (126/61 - 151/63)  BP(mean): 91 (03 Jan 2024 10:00) (85 - 99)  ABP: --  ABP(mean): --  RR: 21 (03 Jan 2024 10:00) (20 - 29)  SpO2: 99% (03 Jan 2024 10:00) (91% - 99%)    O2 Parameters below as of 03 Jan 2024 10:00  Patient On (Oxygen Delivery Method): room air    PHYSICAL EXAM:   General: NAD   Neuro: AOx3   ENT: mucus membrane moist, generalized jaudice, scleral icterus   Cardiovascular: sinus tachycardia, no murmurs   Respiratory: CTAB  Gastrointestinal: soft, nontender and nondistended. No guarding or rebound. ECF pouch wound manager in place. PCT clamped. Wound bed in midline with healthy granulating tissue, punctate bleeding appreciated when evaluated.   Extremities: warm, no dependent edema  Vascular: no cyanosis/erythema  Skin: no rashes  MSK: no joint swelling.     Lines/tubes/drains:  [place]:	      Vent settings:  I&O's Summary    02 Jan 2024 07:01  -  03 Jan 2024 07:00  --------------------------------------------------------  IN: 3727.6 mL / OUT: 4625 mL / NET: -897.4 mL    LABS:                        8.5    7.31  )-----------( 170      ( 03 Jan 2024 05:29 )             26.1     01-03    135  |  102  |  42<H>  ----------------------------<  139<H>  4.4   |  26  |  1.47<H>    Ca    8.3<L>      03 Jan 2024 06:14  Phos  3.1     01-03  Mg     2.0     01-03    TPro  8.3  /  Alb  2.2<L>  /  TBili  6.6<H>  /  DBili  4.2<H>  /  AST  94<H>  /  ALT  66<H>  /  AlkPhos  123<H>  01-03    PT/INR - ( 03 Jan 2024 05:29 )   PT: 15.5 sec;   INR: 1.37            Urinalysis Basic - ( 03 Jan 2024 06:14 )    Color: x / Appearance: x / SG: x / pH: x  Gluc: 139 mg/dL / Ketone: x  / Bili: x / Urobili: x   Blood: x / Protein: x / Nitrite: x   Leuk Esterase: x / RBC: x / WBC x   Sq Epi: x / Non Sq Epi: x / Bacteria: x      CAPILLARY BLOOD GLUCOSE        LIVER FUNCTIONS - ( 03 Jan 2024 06:14 )  Alb: 2.2 g/dL / Pro: 8.3 g/dL / ALK PHOS: 123 U/L / ALT: 66 U/L / AST: 94 U/L / GGT: x             Cultures:    RADIOLOGY & ADDITIONAL STUDIES:

## 2024-01-04 NOTE — PROGRESS NOTE ADULT - ASSESSMENT
The patient is a 78 year old male with a PMHx of Crohn's, AFib/Flutter s/p DCCVs on amiodarone, remote ileocectomy and open appendectomy. Admitted (6/23) for SBO vs Crohns flare, s/p NGT decompression and s/p lap converted to open TRE, SBR x 3, left in discontinuity with abthera vac on (6/27), RTOR for ileocolic resection, small bowel anastomosis, and abdominal wall closure on (6/28), c/b fluid collection s/p IR aspiration of perihepatic fluid on (7/3), c/b wound dehiscence s/p RTOR exlap, washout, ileocolic resection with end ileostomy, blow hole colostomy, red rubber from ileostomy to small bowel anastomosis; vicryl bridging mesh on (7/5) transferred to SICU postoperatively for hemodynamic monitoring, with hospital course complicated by periods of severe ELVI and hyponatremia, which resolved but stepped back up for to SICU on (9/10) for acute AMS, intermittent hypoglycemia, AFib with RVR. Percutaneous Cholecystostomy placed on (9/11) for enlarged GB, PCT capped 10/5 and uncapped 10/25 for hyperbilirubinemia, Right brachial DVT, left basillic and cephalic superficial thrombus on duplex 11/2, CT 11/14 with ALDEN ground glass opacity of unclear etiology, completed empiric 7day cefepime course 11/22, rising T-bili of unclear etiology now w resolved candida glabrata fungemia, ELVI improving.     Reg Diet  Pain/nausea control prn  Bolus prn for insensible losses + ECF output  picc replacement  SQH bid, SCDs/OOBA/IS  BiPAP q12h prn for atelectasis  Wound manager changes daily this week, then Tues/Fri  C/W octreotide, cholestyramine  C/W Epogen weekly  Rest of care per SICU

## 2024-01-04 NOTE — PROGRESS NOTE ADULT - SUBJECTIVE AND OBJECTIVE BOX
STATUS POST:    6/27: Laparoscopic lysis of adhesions, converted to open lysis of adhesions, SBR x 3, temporary abdominal closure, 5L cryst, 1L 5% alb, 3 pRBC, 1 FFP, 1 plts  6/28: ex lap, removal of abthera, ileocolic resection, small bowel anastamosis, , 1.6 crystalloid, 250 5%, 175 uop   7/3: IR Gendel drained perihepatic aspiration of serous fluid.   7/5: RTOR exlap, washout, ileocolic resection with end ileostomy, blow hole colostomy, fistula, red rubber from ileostomy to small bowel anastomosis; vicryl bridging mesh; R JOYCE below ileostomy, L JOYCE at small bowel enterotomy repair; 500 LR, 500 5% albumin, 3u PRBC, 2 FFP, 400 UOP,   9/11: s/p perc cony    SUBJECTIVE: Pt seen and examined at bedside this am by surgery team. Patient is sitting comfortably in bed with no complaints. Tolerating diet, pain well controlled with current regimen. Patient denies fever, nausea, vomiting, chest pain, and shortness of breath. No pouch leaks overnight, dark brown/green output.     MEDICATIONS  (STANDING):  chlorhexidine 2% Cloths 1 Application(s) Topical <User Schedule>  cholestyramine Powder (Sugar-Free) 4 Gram(s) Oral daily  EPINEPHrine   1 mG/mL (1:1,000) Topical Solution 1 Application(s) Topical once  epoetin matt-epbx (RETACRIT) Injectable 89990 Unit(s) SubCutaneous every 7 days  ergocalciferol 47708 Unit(s) Oral <User Schedule>  gabapentin 100 milliGRAM(s) Oral at bedtime  glucagon  Injectable 1 milliGRAM(s) IntraMuscular once  heparin   Injectable 5000 Unit(s) SubCutaneous every 8 hours  levothyroxine Injectable 30 MICROGram(s) IV Push at bedtime  lipid, fat emulsion (Fish Oil and Plant Based) 20% Infusion 0.54 Gm/kG/Day (20.83 mL/Hr) IV Continuous <Continuous>  metoprolol tartrate Injectable 7.5 milliGRAM(s) IV Push every 4 hours  Parenteral Nutrition - Adult 1 Each TPN Continuous <Continuous>  Parenteral Nutrition - Adult 1 Each TPN Continuous <Continuous>  ursodiol Suspension 300 milliGRAM(s) Oral every 8 hours  venlafaxine XR. 150 milliGRAM(s) Oral daily    MEDICATIONS  (PRN):  chlorhexidine 0.12% Liquid 15 milliLiter(s) Swish and Spit two times a day PRN oral hygeine  hydrALAZINE Injectable 10 milliGRAM(s) IV Push every 6 hours PRN SBP >170  LORazepam   Injectable 1 milliGRAM(s) IV Push <User Schedule> PRN Anxiety  ondansetron Injectable 4 milliGRAM(s) IV Push every 6 hours PRN Nausea and/or Vomiting      Vital Signs Last 24 Hrs  T(C): 36.5 (04 Jan 2024 05:11), Max: 36.9 (04 Jan 2024 00:58)  T(F): 97.7 (04 Jan 2024 05:11), Max: 98.4 (04 Jan 2024 00:58)  HR: 102 (04 Jan 2024 15:00) (93 - 107)  BP: 146/75 (04 Jan 2024 15:00) (123/74 - 166/73)  BP(mean): 104 (04 Jan 2024 15:00) (88 - 110)  RR: 36 (04 Jan 2024 15:00) (20 - 36)  SpO2: 99% (04 Jan 2024 15:00) (92% - 99%)    Parameters below as of 04 Jan 2024 15:00  Patient On (Oxygen Delivery Method): room air      Physical Exam  General: Patient is doing well and lying in bed comfortably  Constitutional: alert and oriented   Pulm: Nonlabored breathing, no respiratory distress  CV: Regular rate and rhythm, normal sinus rhythm  Abd: soft, nontender, nondistended. No rebound, no guarding. Wound manager in place without leaks, dark brown/green output. Perc cony capped Ileostomy with minimal output.   Extremities: warm, well perfused, no edema      I&O's Detail    03 Jan 2024 07:01  -  04 Jan 2024 07:00  --------------------------------------------------------  IN:    Fat Emulsion (Fish Oil &amp; Plant Based) 20% Infusion: 270.4 mL    IV PiggyBack: 500 mL    Lactated Ringers Bolus: 500 mL    Oral Fluid: 300 mL    TPN (Total Parenteral Nutrition): 2330 mL  Total IN: 3900.4 mL    OUT:    Drain (mL): 2150 mL    Drain (mL): 50 mL    Voided (mL): 2315 mL  Total OUT: 4515 mL    Total NET: -614.6 mL      04 Jan 2024 07:01  -  04 Jan 2024 16:31  --------------------------------------------------------  IN:    IV PiggyBack: 1000 mL    TPN (Total Parenteral Nutrition): 138 mL  Total IN: 1138 mL    OUT:    Drain (mL): 550 mL    Fat Emulsion (Fish Oil &amp; Plant Based) 20% Infusion: 0 mL    Voided (mL): 900 mL  Total OUT: 1450 mL    Total NET: -312 mL        LABS:                        8.5    7.31  )-----------( 170      ( 03 Jan 2024 05:29 )             26.1     01-04    134<L>  |  101  |  40<H>  ----------------------------<  152<H>  4.5   |  26  |  1.40<H>    Ca    8.5      04 Jan 2024 05:30  Phos  2.8     01-04  Mg     2.0     01-04    TPro  8.5<H>  /  Alb  2.4<L>  /  TBili  6.6<H>  /  DBili  4.4<H>  /  AST  94<H>  /  ALT  67<H>  /  AlkPhos  124<H>  01-04    PT/INR - ( 03 Jan 2024 05:29 )   PT: 15.5 sec;   INR: 1.37            Urinalysis Basic - ( 04 Jan 2024 05:30 )    Color: x / Appearance: x / SG: x / pH: x  Gluc: 152 mg/dL / Ketone: x  / Bili: x / Urobili: x   Blood: x / Protein: x / Nitrite: x   Leuk Esterase: x / RBC: x / WBC x   Sq Epi: x / Non Sq Epi: x / Bacteria: x     STATUS POST:    6/27: Laparoscopic lysis of adhesions, converted to open lysis of adhesions, SBR x 3, temporary abdominal closure, 5L cryst, 1L 5% alb, 3 pRBC, 1 FFP, 1 plts  6/28: ex lap, removal of abthera, ileocolic resection, small bowel anastamosis, , 1.6 crystalloid, 250 5%, 175 uop   7/3: IR Gendel drained perihepatic aspiration of serous fluid.   7/5: RTOR exlap, washout, ileocolic resection with end ileostomy, blow hole colostomy, fistula, red rubber from ileostomy to small bowel anastomosis; vicryl bridging mesh; R JOYCE below ileostomy, L JOYCE at small bowel enterotomy repair; 500 LR, 500 5% albumin, 3u PRBC, 2 FFP, 400 UOP,   9/11: s/p perc cony    SUBJECTIVE: Pt seen and examined at bedside this am by surgery team. Patient is sitting comfortably in bed with no complaints. Tolerating diet, pain well controlled with current regimen. Patient denies fever, nausea, vomiting, chest pain, and shortness of breath. No pouch leaks overnight, dark brown/green output.     MEDICATIONS  (STANDING):  chlorhexidine 2% Cloths 1 Application(s) Topical <User Schedule>  cholestyramine Powder (Sugar-Free) 4 Gram(s) Oral daily  EPINEPHrine   1 mG/mL (1:1,000) Topical Solution 1 Application(s) Topical once  epoetin matt-epbx (RETACRIT) Injectable 45074 Unit(s) SubCutaneous every 7 days  ergocalciferol 40978 Unit(s) Oral <User Schedule>  gabapentin 100 milliGRAM(s) Oral at bedtime  glucagon  Injectable 1 milliGRAM(s) IntraMuscular once  heparin   Injectable 5000 Unit(s) SubCutaneous every 8 hours  levothyroxine Injectable 30 MICROGram(s) IV Push at bedtime  lipid, fat emulsion (Fish Oil and Plant Based) 20% Infusion 0.54 Gm/kG/Day (20.83 mL/Hr) IV Continuous <Continuous>  metoprolol tartrate Injectable 7.5 milliGRAM(s) IV Push every 4 hours  Parenteral Nutrition - Adult 1 Each TPN Continuous <Continuous>  Parenteral Nutrition - Adult 1 Each TPN Continuous <Continuous>  ursodiol Suspension 300 milliGRAM(s) Oral every 8 hours  venlafaxine XR. 150 milliGRAM(s) Oral daily    MEDICATIONS  (PRN):  chlorhexidine 0.12% Liquid 15 milliLiter(s) Swish and Spit two times a day PRN oral hygeine  hydrALAZINE Injectable 10 milliGRAM(s) IV Push every 6 hours PRN SBP >170  LORazepam   Injectable 1 milliGRAM(s) IV Push <User Schedule> PRN Anxiety  ondansetron Injectable 4 milliGRAM(s) IV Push every 6 hours PRN Nausea and/or Vomiting      Vital Signs Last 24 Hrs  T(C): 36.5 (04 Jan 2024 05:11), Max: 36.9 (04 Jan 2024 00:58)  T(F): 97.7 (04 Jan 2024 05:11), Max: 98.4 (04 Jan 2024 00:58)  HR: 102 (04 Jan 2024 15:00) (93 - 107)  BP: 146/75 (04 Jan 2024 15:00) (123/74 - 166/73)  BP(mean): 104 (04 Jan 2024 15:00) (88 - 110)  RR: 36 (04 Jan 2024 15:00) (20 - 36)  SpO2: 99% (04 Jan 2024 15:00) (92% - 99%)    Parameters below as of 04 Jan 2024 15:00  Patient On (Oxygen Delivery Method): room air      Physical Exam  General: Patient is doing well and lying in bed comfortably  Constitutional: alert and oriented   Pulm: Nonlabored breathing, no respiratory distress  CV: Regular rate and rhythm, normal sinus rhythm  Abd: soft, nontender, nondistended. No rebound, no guarding. Wound manager in place without leaks, dark brown/green output. Perc cony capped Ileostomy with minimal output.   Extremities: warm, well perfused, no edema      I&O's Detail    03 Jan 2024 07:01  -  04 Jan 2024 07:00  --------------------------------------------------------  IN:    Fat Emulsion (Fish Oil &amp; Plant Based) 20% Infusion: 270.4 mL    IV PiggyBack: 500 mL    Lactated Ringers Bolus: 500 mL    Oral Fluid: 300 mL    TPN (Total Parenteral Nutrition): 2330 mL  Total IN: 3900.4 mL    OUT:    Drain (mL): 2150 mL    Drain (mL): 50 mL    Voided (mL): 2315 mL  Total OUT: 4515 mL    Total NET: -614.6 mL      04 Jan 2024 07:01  -  04 Jan 2024 16:31  --------------------------------------------------------  IN:    IV PiggyBack: 1000 mL    TPN (Total Parenteral Nutrition): 138 mL  Total IN: 1138 mL    OUT:    Drain (mL): 550 mL    Fat Emulsion (Fish Oil &amp; Plant Based) 20% Infusion: 0 mL    Voided (mL): 900 mL  Total OUT: 1450 mL    Total NET: -312 mL        LABS:                        8.5    7.31  )-----------( 170      ( 03 Jan 2024 05:29 )             26.1     01-04    134<L>  |  101  |  40<H>  ----------------------------<  152<H>  4.5   |  26  |  1.40<H>    Ca    8.5      04 Jan 2024 05:30  Phos  2.8     01-04  Mg     2.0     01-04    TPro  8.5<H>  /  Alb  2.4<L>  /  TBili  6.6<H>  /  DBili  4.4<H>  /  AST  94<H>  /  ALT  67<H>  /  AlkPhos  124<H>  01-04    PT/INR - ( 03 Jan 2024 05:29 )   PT: 15.5 sec;   INR: 1.37            Urinalysis Basic - ( 04 Jan 2024 05:30 )    Color: x / Appearance: x / SG: x / pH: x  Gluc: 152 mg/dL / Ketone: x  / Bili: x / Urobili: x   Blood: x / Protein: x / Nitrite: x   Leuk Esterase: x / RBC: x / WBC x   Sq Epi: x / Non Sq Epi: x / Bacteria: x

## 2024-01-04 NOTE — ADVANCED PRACTICE NURSE CONSULT - ASSESSMENT
Ms Woodruff is a 75 year old female with PMHx of CAD, HTN, HLD, DM who underwent Rt thoracenthesis on today by Dr Landry. She experienced some shortness of breath post procedure requiring application of BiPap. Patient was admitted to ICU for further evaluation. Troponin negative. , which is down from 939 three days ago. Vital signs and labs stable. Bipap has currently been wean, she it tolerating NC. Patient is being admitted to ICU under the care of Hospital Medicine. She is a full code, daughter Merlyn Ahn is surrogate decision maker.     Dustin pouching system removed to access area of bleeding. Dustin pouching system had been patent x 2 days. Surgery team applied epi and silver nitrate to control bleeding. Surgery team requesting pouch with ability to access perifistula skin for epi soaks.   WOCN applied Cavilon to periwound and perifistula skin, ostomy rings and stoma paste along edges of wound and fistula, then applied a [place] one piece high output pouch with window.   Ileostomy draining small amount of yellow liquid effluent. Applied Cavilon skin prep to peristomal skin, stoma ring around the stoma, then a one piece convex pouching system.  WOJOSE LUIS applied Cavilon to periwound and perifistula skin, ostomy rings and stoma paste along edges of wound and fistula, then applied a [place] one piece high output pouch with window.   Ileostomy draining small amount of yellow liquid effluent. Applied Cavilon skin prep to peristomal skin, stoma ring around the stoma, then a one piece convex pouching system.  WOJOSE LUIS applied Cavilon to periwound and perifistula skin, ostomy rings and stoma paste along edges of wound and fistula, then applied a [place] one piece high output pouch with window.   Ileostomy draining small amount of yellow liquid effluent. Applied Cavilon skin prep to peristomal skin, stoma ring around the stoma, then a one piece convex pouching system.

## 2024-01-04 NOTE — PROGRESS NOTE ADULT - ASSESSMENT
A/p:78M w PMH Crohn's, AFib/Flutter s/p DCCVs on amiodarone, remote ileocectomy and open appendectomy. Admitted (6/23) for SBO vs Crohns flare, s/p NGT decompression and s/p lap converted to open TRE, SBR x 3, left in discontinuity with abthera vac on (6/27), RTOR for ileocolic resection, small bowel anastomosis, and abdominal wall closure on (6/28), c/b fluid collection s/p IR aspiration of perihepatic fluid on (7/3), c/b wound dehiscence s/p RTOR exlap, washout, ileocolic resection with end ileostomy, blow hole colostomy, red rubber from ileostomy to small bowel anastomosis; vicryl bridging mesh on (7/5) transferred to SICU postoperatively for hemodynamic monitoring, with hospital course complicated by periods of severe ELVI and hyponatremia, which resolved but stepped back up for to SICU on (9/10) for acute AMS, intermittent hypoglycemia, AFib with RVR. Percutaneous Cholecystostomy placed on (9/11) for enlarged GB, PCT capped 10/5 and uncapped 10/25 for hyperbilirubinemia, Right brachial DVT, left basillic and cephalic superficial thrombus on duplex 11/2, CT 11/14 with ALDEN ground glass opacity of unclear etiology, completed empiric 7day cefepime course 11/22, rising T-bili of unclear etilogy, now w resolved candida glabrata fungemia, ELVI improving.     NEURO: Hx of depression: cont Effexor (halved dose in setting of renal dysfunx). Anxiety: Lorazepam PRN. Holistic consult for anxiety and insomnia. Gabapentin for restless leg syndrome.   CV: Intermittently tachycardic since 12/30. 1L bolus LR given this AM. Will obtain repeat TTE. Metoprolol increased from 7.5mg to 10mg IV q4hr to improve rate control. Hx Afib/flutter: s/p DCCV, off Amio and lipitor due to persistent transaminitis. TTE  (7/18) - PASP 64mmHg, EF 65-70%. holding Losartan & norvasc because not absorbing PO meds, hydralazine PRN.   PULM: Atelectasis/dyspnea improved clinically: cont 1 hr of BiPAP every 12hrs of nasal canula or room air. Encourage OOB and IS.  CT from 11/14 with ALDEN ground glass opacity of unclear etiology. COVID negative. RVP negative. completed 7d empiric cefepime 11/22 to cover for potential PNA.   GI/FEN: Low res, low fat, high protein diet. Cont Ensure max x1/day. Folate, thiamine. PICC replaced 12/4, will switch out PICC today on 1/4. Continue TPN/lipids. High output EC fistula: cont Octreotide & Cholestyramine 10/18. Transaminitis elevated T bili of unclear origin, s/p Perc Deb on 9/11, failed capping trial. Repeat capping trial 1/2, no elevation in Tbili to date; seen by Hepatology - MRCP 10/30 no obstruction, cont ursodiol, RUQ US 11/25 CBD 5mm, hepatic vessels patent  : Voids. ELVI improved: stopped famotidine given renal dysfunction. MARLO Duplex and RP US unremarkable, CK wnl.    ENDO: Hx hypothyroid: IV Synthroid, TSH low on 11/30, decreased synthroid dose, repeat TSH 12/6 wnl. Will obtain repeat thyroid studies on 1/3/24.   ID: currently not on any abx/antifungals. BCx 11/22 grew candida glabrata, likely CLABSI. s/p Caspo & completed Fluconazole course (12/1-12/9). Ophthalmology evaluated - normal ocular exam. Echo no vegetation. PICC replaced on 12/4. Cont Nystatin powder. // DC: caspofungin (11/24-12/1). empiric 7days cefepime (11/15-11/22), C. tertium, Lactobacillis from IR cx 7/3, and candida albicans, lactobacillus, vanc sensitive E faecium, vanc resistant E gallinarum, vanc resistant E casseliflavus, lactobacillus from OR cx 7/5. Completed course of abx with Imipenem (9/10-9/15). Imipenem (8/26--) imipenem (6/30-7/12, 7/23-7/30), Dapto (6/30-7/5 and 7/23-7/24). Empiric dapto (8/23-24) and cefepime (8/23-24).   PPX: SCDs, SQH ppx was on Therapeutic lovenox bid for R brachial vein DVT, but will hold off on anticoagulation since repeat US Duplex negative.  HEME: Anemia - continue Epogen weekly. S/p Iron Sucrose 200 qd x 3 days for chronic anemia.   LINES: PIVs, MERONE PICC (12/4 - ) will plan to switch PICC once a month secondary to history of fungemia (around 1/4) // DC: LUE PICC (9/1-11/1 ), RUE PICC: (11/1-11/24)   WOUNDS/DRAINS: Abdominal wound and fistula with wound manager in place dressing change Tuesday and Friday, had punctate bleeding at wound bed, s/p placed surgicel, attempted to stitch, electrocaudery. Bleeding stopped after applying epi soaked gauze - not bleeding at this time. RLQ Ostomy. IR perc deb tube. // DC: L Clay drain.  PT: Ambulate as tolerated OOB to chair daily.  DISPO: SICU due to complicated hospital course.

## 2024-01-04 NOTE — PROGRESS NOTE ADULT - SUBJECTIVE AND OBJECTIVE BOX
Overnight patient noted to have 5 beats of Vtach, asymptomatic. Patient seen at bedside this morning. Denies chest pain and abdominal pain. Bleeding noted from granulation tissue, wound care at bedside.     O: ICU Vital Signs Last 24 Hrs  T(F): 97.7 (01-04-24 @ 05:11), Max: 98.8 (01-03-24 @ 10:00)  HR: 106 (01-04-24 @ 09:00) (105 - 107)  BP: 150/74 (01-04-24 @ 09:00) (138/61 - 166/73)  BP(mean): 105 (01-04-24 @ 09:00) (88 - 118)  ABP: --  RR: 27 (01-04-24 @ 09:00) (19 - 27)  SpO2: 94% (01-04-24 @ 08:00) (92% - 99%)    PHYSICAL EXAM:   General: NAD   Neuro: AOx3   ENT: mucus membrane moist  Cardiovascular: sinus tachycardia, no murmurs   Respiratory: CTAB  Gastrointestinal: soft, nontender and nondistended. No guarding or rebound. ECF pouch wound manager in place. PCT bilious.   Extremities: warm, no dependent edema  Vascular: no cyanosis/erythema  Skin: no rashes  MSK: no joint swelling.       LABS:    01-04    134<L>  |  101  |  40<H>  ----------------------------<  152<H>  4.5   |  26  |  1.40<H>    Ca    8.5      04 Jan 2024 05:30  Phos  2.8     01-04  Mg     2.0     01-04    TPro  8.5<H>  /  Alb  2.4<L>  /  TBili  6.6<H>  /  DBili  4.4<H>  /  AST  94<H>  /  ALT  67<H>  /  AlkPhos  124<H>  01-04  LIVER FUNCTIONS - ( 04 Jan 2024 05:30 )  Alb: 2.4 g/dL / Pro: 8.5 g/dL / ALK PHOS: 124 U/L / ALT: 67 U/L / AST: 94 U/L / GGT: x                               8.5    7.31  )-----------( 170      ( 03 Jan 2024 05:29 )             26.1   PT/INR - ( 03 Jan 2024 05:29 )   PT: 15.5 sec;   INR: 1.37            Urinalysis Basic - ( 04 Jan 2024 05:30 )    Color: x / Appearance: x / SG: x / pH: x  Gluc: 152 mg/dL / Ketone: x  / Bili: x / Urobili: x   Blood: x / Protein: x / Nitrite: x   Leuk Esterase: x / RBC: x / WBC x   Sq Epi: x / Non Sq Epi: x / Bacteria: x    CAPILLARY BLOOD GLUCOSE        MEDICATIONS  (STANDING):  chlorhexidine 2% Cloths 1 Application(s) Topical <User Schedule>  cholestyramine Powder (Sugar-Free) 4 Gram(s) Oral daily  epoetin matt-epbx (RETACRIT) Injectable 95316 Unit(s) SubCutaneous every 7 days  ergocalciferol 66727 Unit(s) Oral <User Schedule>  gabapentin 100 milliGRAM(s) Oral at bedtime  glucagon  Injectable 1 milliGRAM(s) IntraMuscular once  heparin   Injectable 5000 Unit(s) SubCutaneous every 12 hours  levothyroxine Injectable 30 MICROGram(s) IV Push at bedtime  lipid, fat emulsion (Fish Oil and Plant Based) 20% Infusion 0.54 Gm/kG/Day (20.83 mL/Hr) IV Continuous <Continuous>  lipid, fat emulsion (Fish Oil and Plant Based) 20% Infusion 0.54 Gm/kG/Day (20.83 mL/Hr) IV Continuous <Continuous>  metoprolol tartrate Injectable 10 milliGRAM(s) IV Push every 4 hours  Parenteral Nutrition - Adult 1 Each TPN Continuous <Continuous>  Parenteral Nutrition - Adult 1 Each TPN Continuous <Continuous>  ursodiol Suspension 300 milliGRAM(s) Oral every 8 hours  venlafaxine XR. 150 milliGRAM(s) Oral daily    MEDICATIONS  (PRN):  chlorhexidine 0.12% Liquid 15 milliLiter(s) Swish and Spit two times a day PRN oral hygeine  hydrALAZINE Injectable 10 milliGRAM(s) IV Push every 6 hours PRN SBP >170  LORazepam   Injectable 1 milliGRAM(s) IV Push <User Schedule> PRN Anxiety  ondansetron Injectable 4 milliGRAM(s) IV Push every 6 hours PRN Nausea and/or Vomiting      Poole:	  [ ] None	[ ] Daily Poole Order Placed	   Indication:	  [ ] Strict I and O's    [ ] Obstruction     [ ] Incontinence + Stage 3 or 4 Decubitus  Central Line:  [ ] None	   [ ]  Medication / TPN Administration     [ ] No Peripheral IV  Overnight patient noted to have 5 beats of Vtach, asymptomatic. Patient seen at bedside this morning. Denies chest pain and abdominal pain. Bleeding noted from granulation tissue, wound care at bedside.     O: ICU Vital Signs Last 24 Hrs  T(F): 97.7 (01-04-24 @ 05:11), Max: 98.8 (01-03-24 @ 10:00)  HR: 106 (01-04-24 @ 09:00) (105 - 107)  BP: 150/74 (01-04-24 @ 09:00) (138/61 - 166/73)  BP(mean): 105 (01-04-24 @ 09:00) (88 - 118)  ABP: --  RR: 27 (01-04-24 @ 09:00) (19 - 27)  SpO2: 94% (01-04-24 @ 08:00) (92% - 99%)    PHYSICAL EXAM:   General: NAD   Neuro: AOx3   ENT: mucus membrane moist  Cardiovascular: sinus tachycardia, no murmurs   Respiratory: CTAB  Gastrointestinal: soft, nontender and nondistended. No guarding or rebound. ECF pouch wound manager in place. PCT bilious.   Extremities: warm, no dependent edema  Vascular: no cyanosis/erythema  Skin: no rashes  MSK: no joint swelling.       LABS:    01-04    134<L>  |  101  |  40<H>  ----------------------------<  152<H>  4.5   |  26  |  1.40<H>    Ca    8.5      04 Jan 2024 05:30  Phos  2.8     01-04  Mg     2.0     01-04    TPro  8.5<H>  /  Alb  2.4<L>  /  TBili  6.6<H>  /  DBili  4.4<H>  /  AST  94<H>  /  ALT  67<H>  /  AlkPhos  124<H>  01-04  LIVER FUNCTIONS - ( 04 Jan 2024 05:30 )  Alb: 2.4 g/dL / Pro: 8.5 g/dL / ALK PHOS: 124 U/L / ALT: 67 U/L / AST: 94 U/L / GGT: x                               8.5    7.31  )-----------( 170      ( 03 Jan 2024 05:29 )             26.1   PT/INR - ( 03 Jan 2024 05:29 )   PT: 15.5 sec;   INR: 1.37            Urinalysis Basic - ( 04 Jan 2024 05:30 )    Color: x / Appearance: x / SG: x / pH: x  Gluc: 152 mg/dL / Ketone: x  / Bili: x / Urobili: x   Blood: x / Protein: x / Nitrite: x   Leuk Esterase: x / RBC: x / WBC x   Sq Epi: x / Non Sq Epi: x / Bacteria: x    CAPILLARY BLOOD GLUCOSE        MEDICATIONS  (STANDING):  chlorhexidine 2% Cloths 1 Application(s) Topical <User Schedule>  cholestyramine Powder (Sugar-Free) 4 Gram(s) Oral daily  epoetin matt-epbx (RETACRIT) Injectable 93078 Unit(s) SubCutaneous every 7 days  ergocalciferol 24603 Unit(s) Oral <User Schedule>  gabapentin 100 milliGRAM(s) Oral at bedtime  glucagon  Injectable 1 milliGRAM(s) IntraMuscular once  heparin   Injectable 5000 Unit(s) SubCutaneous every 12 hours  levothyroxine Injectable 30 MICROGram(s) IV Push at bedtime  lipid, fat emulsion (Fish Oil and Plant Based) 20% Infusion 0.54 Gm/kG/Day (20.83 mL/Hr) IV Continuous <Continuous>  lipid, fat emulsion (Fish Oil and Plant Based) 20% Infusion 0.54 Gm/kG/Day (20.83 mL/Hr) IV Continuous <Continuous>  metoprolol tartrate Injectable 10 milliGRAM(s) IV Push every 4 hours  Parenteral Nutrition - Adult 1 Each TPN Continuous <Continuous>  Parenteral Nutrition - Adult 1 Each TPN Continuous <Continuous>  ursodiol Suspension 300 milliGRAM(s) Oral every 8 hours  venlafaxine XR. 150 milliGRAM(s) Oral daily    MEDICATIONS  (PRN):  chlorhexidine 0.12% Liquid 15 milliLiter(s) Swish and Spit two times a day PRN oral hygeine  hydrALAZINE Injectable 10 milliGRAM(s) IV Push every 6 hours PRN SBP >170  LORazepam   Injectable 1 milliGRAM(s) IV Push <User Schedule> PRN Anxiety  ondansetron Injectable 4 milliGRAM(s) IV Push every 6 hours PRN Nausea and/or Vomiting      Poole:	  [ ] None	[ ] Daily Poole Order Placed	   Indication:	  [ ] Strict I and O's    [ ] Obstruction     [ ] Incontinence + Stage 3 or 4 Decubitus  Central Line:  [ ] None	   [ ]  Medication / TPN Administration     [ ] No Peripheral IV

## 2024-01-04 NOTE — PROGRESS NOTE ADULT - ATTENDING COMMENTS
5 beat asx NSVT  tachy low 100s  giving 1L this AM  cr improving, electrolytes optimized  Per cony tube capped bilis stable   switched labetalol back to metoprolol to allow uptitration for HR control.  Has been getting DVT PPX and mobile, no symptoms of VTE  getting lower extremity ultrasound to r/o   TTE      Planned for new PICC  Feeling good this AM    Decision making high complexity

## 2024-01-05 NOTE — PROGRESS NOTE ADULT - ASSESSMENT
The patient is a 78 year old male with a PMHx of Crohn's, AFib/Flutter s/p DCCVs on amiodarone, remote ileocectomy and open appendectomy. Admitted (6/23) for SBO vs Crohns flare, s/p NGT decompression and s/p lap converted to open TRE, SBR x 3, left in discontinuity with abthera vac on (6/27), RTOR for ileocolic resection, small bowel anastomosis, and abdominal wall closure on (6/28), c/b fluid collection s/p IR aspiration of perihepatic fluid on (7/3), c/b wound dehiscence s/p RTOR exlap, washout, ileocolic resection with end ileostomy, blow hole colostomy, red rubber from ileostomy to small bowel anastomosis; vicryl bridging mesh on (7/5) transferred to SICU postoperatively for hemodynamic monitoring, with hospital course complicated by periods of severe ELVI and hyponatremia, which resolved but stepped back up for to SICU on (9/10) for acute AMS, intermittent hypoglycemia, AFib with RVR. Percutaneous Cholecystostomy placed on (9/11) for enlarged GB, PCT capped 10/5 and uncapped 10/25 for hyperbilirubinemia, Right brachial DVT, left basillic and cephalic superficial thrombus on duplex 11/2, CT 11/14 with ALDEN ground glass opacity of unclear etiology, completed empiric 7day cefepime course 11/22, rising T-bili of unclear etiology now w resolved candida glabrata fungemia, ELVI improving.     Reg Diet  Pain/nausea control prn  Recommend 25% Albumin q8h x 3 days  picc replacement  SQH bid, SCDs/OOBA/IS  BiPAP q12h prn for atelectasis  Wound manager changes daily this week, then Tues/Fri  C/W octreotide, cholestyramine  C/W Epogen weekly  Rest of care per SICU

## 2024-01-05 NOTE — PROGRESS NOTE ADULT - ATTENDING COMMENTS
tachy low 100s  cr improving, electrolytes optimized  Per coyn tube capped bilis stable   switched labetalol back to metoprolol to allow uptitration for HR control.  Has been getting DVT PPX and mobile, no symptoms of VTE  getting lower extremity ultrasound to r/o   TTE similar function to prior and no decrease in TAPSE but biatrial enlargement with PASP 40 (higher than most recent study on 11/24, but lower than study on 7/18 at which time was 63)  no e/o infection      Planned for new PICC today  Feeling good this AM  net negative this AM, surgery trialling albumin due to low albumin.    Decision making high complexity . tachy low 100s  cr improving, electrolytes optimized  Per cony tube capped bilis stable   switched labetalol back to metoprolol to allow uptitration for HR control.  Has been getting DVT PPX and mobile, no symptoms of VTE  getting lower extremity ultrasound to r/o   TTE similar function to prior and no decrease in TAPSE but biatrial enlargement with PASP 40 (higher than most recent study on 11/24, but lower than study on 7/18 at which time was 63)  no e/o infection      Planned for new PICC today  Feeling good this AM  net negative this AM, surgery trialling albumin due to low albumin.    Decision making high complexity .

## 2024-01-05 NOTE — PROGRESS NOTE ADULT - ASSESSMENT
A/p:78M w PMH Crohn's, AFib/Flutter s/p DCCVs on amiodarone, remote ileocectomy and open appendectomy. Admitted (6/23) for SBO vs Crohns flare, s/p NGT decompression and s/p lap converted to open TRE, SBR x 3, left in discontinuity with abthera vac on (6/27), RTOR for ileocolic resection, small bowel anastomosis, and abdominal wall closure on (6/28), c/b fluid collection s/p IR aspiration of perihepatic fluid on (7/3), c/b wound dehiscence s/p RTOR exlap, washout, ileocolic resection with end ileostomy, blow hole colostomy, red rubber from ileostomy to small bowel anastomosis; vicryl bridging mesh on (7/5) transferred to SICU postoperatively for hemodynamic monitoring, with hospital course complicated by periods of severe ELVI and hyponatremia, which resolved but stepped back up for to SICU on (9/10) for acute AMS, intermittent hypoglycemia, AFib with RVR. Percutaneous Cholecystostomy placed on (9/11) for enlarged GB, PCT capped 10/5 and uncapped 10/25 for hyperbilirubinemia, Right brachial DVT, left basillic and cephalic superficial thrombus on duplex 11/2, CT 11/14 with ALDEN ground glass opacity of unclear etiology, completed empiric 7day cefepime course 11/22, rising T-bili of unclear etilogy, now w resolved candida glabrata fungemia, ELVI improving.     NEURO: Hx of depression: cont Effexor (halved dose in setting of renal dysfunx). Anxiety: Lorazepam PRN. Holistic consult for anxiety and insomnia. Gabapentin for restless leg syndrome.   CV: Sinus tachycardia may be secondary to hypovolemia. Net negative 1500 this morning. Will give 5% albumin bolus. Metoprolol 7.5mgIV q4hr to improve rate control. Hx Afib/flutter: s/p DCCV, off Amio and lipitor due to persistent transaminitis. TTE  (7/18) - PASP 64mmHg, EF 65-70%. holding Losartan & norvasc because not absorbing PO meds, hydralazine PRN. TTE (1/4) LVEF 75%, mild/mod AR, PASP 40, RVEF wnl. POCUS 1/4/23 bilateral effusions.   PULM: Atelectasis/dyspnea improved clinically: cont 1 hr of BiPAP every 12hrs of nasal canula or room air. Encourage OOB and IS.  CT from 11/14 with ALDEN ground glass opacity of unclear etiology. COVID negative. RVP negative. completed 7d empiric cefepime 11/22 to cover for potential PNA.   GI/FEN: Low res, low fat, high protein diet. Cont Ensure max x1/day. Folate, thiamine. PICC replaced 12/4, will switch out PICC today on 1/4. Continue TPN/lipids. High output EC fistula: cont Octreotide & Cholestyramine 10/18. Transaminitis elevated T bili of unclear origin, s/p Perc Deb on 9/11, failed capping trial. Repeat capping trial 1/2, no elevation in Tbili to date; seen by Hepatology - MRCP 10/30 no obstruction, cont ursodiol, RUQ US 11/25 CBD 5mm, hepatic vessels patent  : Voids. ELVI improved: stopped famotidine given renal dysfunction. MARLO Duplex and RP US unremarkable, CK wnl.    ENDO: Hx hypothyroid: IV Synthroid, TSH low on 11/30, decreased synthroid dose, repeat TSH 12/6 wnl. Repeat thryoid studies WNL 1/3/24.   ID: currently not on any abx/antifungals. BCx 11/22 grew candida glabrata, likely CLABSI. s/p Caspo & completed Fluconazole course (12/1-12/9). Ophthalmology evaluated - normal ocular exam. Echo no vegetation. PICC replaced on 12/4. Cont Nystatin powder. // DC: caspofungin (11/24-12/1). empiric 7days cefepime (11/15-11/22), C. tertium, Lactobacillis from IR cx 7/3, and candida albicans, lactobacillus, vanc sensitive E faecium, vanc resistant E gallinarum, vanc resistant E casseliflavus, lactobacillus from OR cx 7/5. Completed course of abx with Imipenem (9/10-9/15). Imipenem (8/26--) imipenem (6/30-7/12, 7/23-7/30), Dapto (6/30-7/5 and 7/23-7/24). Empiric dapto (8/23-24) and cefepime (8/23-24).   PPX: SCDs, SQH ppx was on Therapeutic lovenox bid for R brachial vein DVT, but will hold off on anticoagulation since repeat US Duplex negative.  HEME: Anemia - continue Epogen weekly. S/p Iron Sucrose 200 qd x 3 days for chronic anemia.   LINES: ZAHEER Pinto PICC (12/4 - ) will plan to switch PICC once a month secondary to history of fungemia (around 1/4) // DC: LUE PICC (9/1-11/1 ), RUE PICC: (11/1-11/24)   WOUNDS/DRAINS: Abdominal wound and fistula with wound manager in place dressing change Tuesday and Friday, had punctate bleeding at wound bed, s/p placed surgicel, attempted to stitch, electrocaudery. Bleeding stopped after applying epi soaked gauze - not bleeding at this time. RLQ Ostomy. IR perc deb tube. // DC: L Clay drain.  PT: Ambulate as tolerated OOB to chair daily.  DISPO: SICU due to complicated hospital course.   A/p:78M w PMH Crohn's, AFib/Flutter s/p DCCVs on amiodarone, remote ileocectomy and open appendectomy. Admitted (6/23) for SBO vs Crohns flare, s/p NGT decompression and s/p lap converted to open TRE, SBR x 3, left in discontinuity with abthera vac on (6/27), RTOR for ileocolic resection, small bowel anastomosis, and abdominal wall closure on (6/28), c/b fluid collection s/p IR aspiration of perihepatic fluid on (7/3), c/b wound dehiscence s/p RTOR exlap, washout, ileocolic resection with end ileostomy, blow hole colostomy, red rubber from ileostomy to small bowel anastomosis; vicryl bridging mesh on (7/5) transferred to SICU postoperatively for hemodynamic monitoring, with hospital course complicated by periods of severe ELVI and hyponatremia, which resolved but stepped back up for to SICU on (9/10) for acute AMS, intermittent hypoglycemia, AFib with RVR. Percutaneous Cholecystostomy placed on (9/11) for enlarged GB, PCT capped 10/5 and uncapped 10/25 for hyperbilirubinemia, Right brachial DVT, left basillic and cephalic superficial thrombus on duplex 11/2, CT 11/14 with ALDEN ground glass opacity of unclear etiology, completed empiric 7day cefepime course 11/22, rising T-bili of unclear etilogy, now w resolved candida glabrata fungemia, ELVI improving.     NEURO: Hx of depression: cont Effexor (halved dose in setting of renal dysfunx). Anxiety: Lorazepam PRN. Holistic consult for anxiety and insomnia. Gabapentin for restless leg syndrome.   CV: Sinus tachycardia may be secondary to hypovolemia. Net negative 1500 this morning. Albumin 5% bolus given. Will give 25% albumin q8h x 3 days for hypoalbuminemia. Metoprolol 7.5mgIV q4hr to improve rate control. Hx Afib/flutter: s/p DCCV, off Amio and lipitor due to persistent transaminitis. TTE  (7/18) - PASP 64mmHg, EF 65-70%. holding Losartan & norvasc because not absorbing PO meds, hydralazine PRN. TTE (1/4) LVEF 75%, mild/mod AR, PASP 40, RVEF wnl. POCUS 1/4/23 bilateral effusions. Will follow up bilateral ultrasound.   PULM: Atelectasis/dyspnea improved clinically: cont 1 hr of BiPAP every 12hrs of nasal canula or room air. Encourage OOB and IS.  CT from 11/14 with ALDEN ground glass opacity of unclear etiology. COVID negative. RVP negative. completed 7d empiric cefepime 11/22 to cover for potential PNA.   GI/FEN: Low res, low fat, high protein diet. Cont Ensure max x1/day. Folate, thiamine. PICC replaced 12/4, will switch out PICC today on 1/4. Continue TPN/lipids. High output EC fistula: cont Octreotide & Cholestyramine 10/18. Transaminitis elevated T bili of unclear origin, s/p Perc Deb on 9/11, failed capping trial. Repeat capping trial 1/2, no elevation in Tbili to date; seen by Hepatology - MRCP 10/30 no obstruction, cont ursodiol, RUQ US 11/25 CBD 5mm, hepatic vessels patent  : Voids. ELVI improved: stopped famotidine given renal dysfunction. MARLO Duplex and RP US unremarkable, CK wnl.    ENDO: Hx hypothyroid: IV Synthroid, TSH low on 11/30, decreased synthroid dose, repeat TSH 12/6 wnl. Repeat thryoid studies WNL 1/3/24.   ID: currently not on any abx/antifungals. BCx 11/22 grew candida glabrata, likely CLABSI. s/p Caspo & completed Fluconazole course (12/1-12/9). Ophthalmology evaluated - normal ocular exam. Echo no vegetation. PICC replaced on 12/4. Cont Nystatin powder. // DC: caspofungin (11/24-12/1). empiric 7days cefepime (11/15-11/22), C. tertium, Lactobacillis from IR cx 7/3, and candida albicans, lactobacillus, vanc sensitive E faecium, vanc resistant E gallinarum, vanc resistant E casseliflavus, lactobacillus from OR cx 7/5. Completed course of abx with Imipenem (9/10-9/15). Imipenem (8/26--) imipenem (6/30-7/12, 7/23-7/30), Dapto (6/30-7/5 and 7/23-7/24). Empiric dapto (8/23-24) and cefepime (8/23-24).   PPX: SCDs, SQH ppx was on Therapeutic lovenox bid for R brachial vein DVT, but will hold off on anticoagulation since repeat US Duplex negative.  HEME: Anemia - continue Epogen weekly. S/p Iron Sucrose 200 qd x 3 days for chronic anemia.   LINES: PIVs, ZAHEER PICC (12/4 - ) will plan to switch PICC once a month secondary to history of fungemia today 1/5/24 // DC: LUE PICC (9/1-11/1 ), RUE PICC: (11/1-11/24)   WOUNDS/DRAINS: Abdominal wound and fistula with wound manager in place dressing change Tuesday and Friday, had punctate bleeding at wound bed, s/p placed surgicel, attempted to stitch, electrocaudery. Bleeding stopped after applying epi soaked gauze - not bleeding at this time. RLQ Ostomy. IR perc deb tube. // DC: L Clay drain.  PT: Ambulate as tolerated OOB to chair daily.  DISPO: SICU due to complicated hospital course.

## 2024-01-05 NOTE — PROGRESS NOTE ADULT - SUBJECTIVE AND OBJECTIVE BOX
STATUS POST:    6/27: Laparoscopic lysis of adhesions, converted to open lysis of adhesions, SBR x 3, temporary abdominal closure, 5L cryst, 1L 5% alb, 3 pRBC, 1 FFP, 1 plts  6/28: ex lap, removal of abthera, ileocolic resection, small bowel anastamosis, , 1.6 crystalloid, 250 5%, 175 uop   7/3: IR Gendel drained perihepatic aspiration of serous fluid.   7/5: RTOR exlap, washout, ileocolic resection with end ileostomy, blow hole colostomy, fistula, red rubber from ileostomy to small bowel anastomosis; vicryl bridging mesh; R JOYCE below ileostomy, L JOYCE at small bowel enterotomy repair; 500 LR, 500 5% albumin, 3u PRBC, 2 FFP, 400 UOP,   9/11: s/p perc cony    SUBJECTIVE: Pt seen and examined at bedside this am by surgery team. Patient is sitting comfortably in bed, had small leak from pouch but reinforced. Does not feel dehydrated. Patient denies pain, fever, nausea, vomiting, chest pain, and shortness of breath.     MEDICATIONS  (STANDING):  albumin human  5% IVPB 250 milliLiter(s) IV Intermittent once  chlorhexidine 2% Cloths 1 Application(s) Topical <User Schedule>  cholestyramine Powder (Sugar-Free) 4 Gram(s) Oral daily  EPINEPHrine   1 mG/mL (1:1,000) Topical Solution 1 Application(s) Topical once  EPINEPHrine   1 mG/mL (1:1,000) Topical Solution 1 Application(s) Topical daily  epoetin matt-epbx (RETACRIT) Injectable 99028 Unit(s) SubCutaneous every 7 days  ergocalciferol 79058 Unit(s) Oral <User Schedule>  gabapentin 100 milliGRAM(s) Oral at bedtime  glucagon  Injectable 1 milliGRAM(s) IntraMuscular once  heparin   Injectable 5000 Unit(s) SubCutaneous every 8 hours  levothyroxine Injectable 30 MICROGram(s) IV Push at bedtime  lipid, fat emulsion (Fish Oil and Plant Based) 20% Infusion 0.54 Gm/kG/Day (20.83 mL/Hr) IV Continuous <Continuous>  lipid, fat emulsion (Fish Oil and Plant Based) 20% Infusion 0.54 Gm/kG/Day (20.83 mL/Hr) IV Continuous <Continuous>  metoprolol tartrate Injectable 7.5 milliGRAM(s) IV Push every 4 hours  Parenteral Nutrition - Adult 1 Each TPN Continuous <Continuous>  Parenteral Nutrition - Adult 1 Each TPN Continuous <Continuous>  ursodiol Suspension 300 milliGRAM(s) Oral every 8 hours  venlafaxine XR. 150 milliGRAM(s) Oral daily    MEDICATIONS  (PRN):  chlorhexidine 0.12% Liquid 15 milliLiter(s) Swish and Spit two times a day PRN oral hygeine  hydrALAZINE Injectable 10 milliGRAM(s) IV Push every 6 hours PRN SBP >170  LORazepam   Injectable 1 milliGRAM(s) IV Push <User Schedule> PRN Anxiety  ondansetron Injectable 4 milliGRAM(s) IV Push every 6 hours PRN Nausea and/or Vomiting      Vital Signs Last 24 Hrs  T(C): 36.9 (05 Jan 2024 08:32), Max: 37.1 (05 Jan 2024 01:01)  T(F): 98.4 (05 Jan 2024 08:32), Max: 98.7 (05 Jan 2024 01:01)  HR: 107 (05 Jan 2024 09:13) (93 - 107)  BP: 145/75 (05 Jan 2024 06:15) (123/74 - 163/78)  BP(mean): 103 (05 Jan 2024 06:15) (89 - 112)  RR: 22 (05 Jan 2024 09:13) (20 - 36)  SpO2: 97% (05 Jan 2024 09:13) (95% - 100%)    Parameters below as of 05 Jan 2024 09:13  Patient On (Oxygen Delivery Method): room air        Physical Exam  General: Patient is doing well and lying in bed comfortably  Constitutional: alert and oriented   Pulm: Nonlabored breathing, no respiratory distress  CV: Regular rate and rhythm, normal sinus rhythm  Abd: soft, nontender, nondistended. No rebound, no guarding. Wound manager in place without leaks, dark brown/green output. Perc cony capped Ileostomy with minimal output.   Extremities: warm, well perfused, no edema    I&O's Detail    04 Jan 2024 07:01  -  05 Jan 2024 07:00  --------------------------------------------------------  IN:    Fat Emulsion (Fish Oil &amp; Plant Based) 20% Infusion: 270.4 mL    IV PiggyBack: 1000 mL    TPN (Total Parenteral Nutrition): 2053 mL  Total IN: 3323.4 mL    OUT:    Drain (mL): 1800 mL    Drain (mL): 65 mL    Fat Emulsion (Fish Oil &amp; Plant Based) 20% Infusion: 0 mL    Voided (mL): 2975 mL  Total OUT: 4840 mL    Total NET: -1516.6 mL      05 Jan 2024 07:01  -  05 Jan 2024 10:23  --------------------------------------------------------  IN:    TPN (Total Parenteral Nutrition): 138 mL  Total IN: 138 mL    OUT:  Total OUT: 0 mL    Total NET: 138 mL        LABS:                        9.3    8.06  )-----------( 176      ( 05 Jan 2024 05:30 )             29.3     01-05    136  |  102  |  39<H>  ----------------------------<  146<H>  4.5   |  25  |  1.42<H>    Ca    8.2<L>      05 Jan 2024 05:30  Phos  3.3     01-05  Mg     2.0     01-05    TPro  8.6<H>  /  Alb  2.0<L>  /  TBili  6.4<H>  /  DBili  4.3<H>  /  AST  90<H>  /  ALT  64<H>  /  AlkPhos  125<H>  01-05    PT/INR - ( 05 Jan 2024 05:30 )   PT: 15.8 sec;   INR: 1.40            Urinalysis Basic - ( 05 Jan 2024 05:30 )    Color: x / Appearance: x / SG: x / pH: x  Gluc: 146 mg/dL / Ketone: x  / Bili: x / Urobili: x   Blood: x / Protein: x / Nitrite: x   Leuk Esterase: x / RBC: x / WBC x   Sq Epi: x / Non Sq Epi: x / Bacteria: x     STATUS POST:    6/27: Laparoscopic lysis of adhesions, converted to open lysis of adhesions, SBR x 3, temporary abdominal closure, 5L cryst, 1L 5% alb, 3 pRBC, 1 FFP, 1 plts  6/28: ex lap, removal of abthera, ileocolic resection, small bowel anastamosis, , 1.6 crystalloid, 250 5%, 175 uop   7/3: IR Gendel drained perihepatic aspiration of serous fluid.   7/5: RTOR exlap, washout, ileocolic resection with end ileostomy, blow hole colostomy, fistula, red rubber from ileostomy to small bowel anastomosis; vicryl bridging mesh; R JOYCE below ileostomy, L JOYCE at small bowel enterotomy repair; 500 LR, 500 5% albumin, 3u PRBC, 2 FFP, 400 UOP,   9/11: s/p perc cony    SUBJECTIVE: Pt seen and examined at bedside this am by surgery team. Patient is sitting comfortably in bed, had small leak from pouch but reinforced. Does not feel dehydrated. Patient denies pain, fever, nausea, vomiting, chest pain, and shortness of breath.     MEDICATIONS  (STANDING):  albumin human  5% IVPB 250 milliLiter(s) IV Intermittent once  chlorhexidine 2% Cloths 1 Application(s) Topical <User Schedule>  cholestyramine Powder (Sugar-Free) 4 Gram(s) Oral daily  EPINEPHrine   1 mG/mL (1:1,000) Topical Solution 1 Application(s) Topical once  EPINEPHrine   1 mG/mL (1:1,000) Topical Solution 1 Application(s) Topical daily  epoetin matt-epbx (RETACRIT) Injectable 64976 Unit(s) SubCutaneous every 7 days  ergocalciferol 84590 Unit(s) Oral <User Schedule>  gabapentin 100 milliGRAM(s) Oral at bedtime  glucagon  Injectable 1 milliGRAM(s) IntraMuscular once  heparin   Injectable 5000 Unit(s) SubCutaneous every 8 hours  levothyroxine Injectable 30 MICROGram(s) IV Push at bedtime  lipid, fat emulsion (Fish Oil and Plant Based) 20% Infusion 0.54 Gm/kG/Day (20.83 mL/Hr) IV Continuous <Continuous>  lipid, fat emulsion (Fish Oil and Plant Based) 20% Infusion 0.54 Gm/kG/Day (20.83 mL/Hr) IV Continuous <Continuous>  metoprolol tartrate Injectable 7.5 milliGRAM(s) IV Push every 4 hours  Parenteral Nutrition - Adult 1 Each TPN Continuous <Continuous>  Parenteral Nutrition - Adult 1 Each TPN Continuous <Continuous>  ursodiol Suspension 300 milliGRAM(s) Oral every 8 hours  venlafaxine XR. 150 milliGRAM(s) Oral daily    MEDICATIONS  (PRN):  chlorhexidine 0.12% Liquid 15 milliLiter(s) Swish and Spit two times a day PRN oral hygeine  hydrALAZINE Injectable 10 milliGRAM(s) IV Push every 6 hours PRN SBP >170  LORazepam   Injectable 1 milliGRAM(s) IV Push <User Schedule> PRN Anxiety  ondansetron Injectable 4 milliGRAM(s) IV Push every 6 hours PRN Nausea and/or Vomiting      Vital Signs Last 24 Hrs  T(C): 36.9 (05 Jan 2024 08:32), Max: 37.1 (05 Jan 2024 01:01)  T(F): 98.4 (05 Jan 2024 08:32), Max: 98.7 (05 Jan 2024 01:01)  HR: 107 (05 Jan 2024 09:13) (93 - 107)  BP: 145/75 (05 Jan 2024 06:15) (123/74 - 163/78)  BP(mean): 103 (05 Jan 2024 06:15) (89 - 112)  RR: 22 (05 Jan 2024 09:13) (20 - 36)  SpO2: 97% (05 Jan 2024 09:13) (95% - 100%)    Parameters below as of 05 Jan 2024 09:13  Patient On (Oxygen Delivery Method): room air        Physical Exam  General: Patient is doing well and lying in bed comfortably  Constitutional: alert and oriented   Pulm: Nonlabored breathing, no respiratory distress  CV: Regular rate and rhythm, normal sinus rhythm  Abd: soft, nontender, nondistended. No rebound, no guarding. Wound manager in place without leaks, dark brown/green output. Perc cony capped Ileostomy with minimal output.   Extremities: warm, well perfused, no edema    I&O's Detail    04 Jan 2024 07:01  -  05 Jan 2024 07:00  --------------------------------------------------------  IN:    Fat Emulsion (Fish Oil &amp; Plant Based) 20% Infusion: 270.4 mL    IV PiggyBack: 1000 mL    TPN (Total Parenteral Nutrition): 2053 mL  Total IN: 3323.4 mL    OUT:    Drain (mL): 1800 mL    Drain (mL): 65 mL    Fat Emulsion (Fish Oil &amp; Plant Based) 20% Infusion: 0 mL    Voided (mL): 2975 mL  Total OUT: 4840 mL    Total NET: -1516.6 mL      05 Jan 2024 07:01  -  05 Jan 2024 10:23  --------------------------------------------------------  IN:    TPN (Total Parenteral Nutrition): 138 mL  Total IN: 138 mL    OUT:  Total OUT: 0 mL    Total NET: 138 mL        LABS:                        9.3    8.06  )-----------( 176      ( 05 Jan 2024 05:30 )             29.3     01-05    136  |  102  |  39<H>  ----------------------------<  146<H>  4.5   |  25  |  1.42<H>    Ca    8.2<L>      05 Jan 2024 05:30  Phos  3.3     01-05  Mg     2.0     01-05    TPro  8.6<H>  /  Alb  2.0<L>  /  TBili  6.4<H>  /  DBili  4.3<H>  /  AST  90<H>  /  ALT  64<H>  /  AlkPhos  125<H>  01-05    PT/INR - ( 05 Jan 2024 05:30 )   PT: 15.8 sec;   INR: 1.40            Urinalysis Basic - ( 05 Jan 2024 05:30 )    Color: x / Appearance: x / SG: x / pH: x  Gluc: 146 mg/dL / Ketone: x  / Bili: x / Urobili: x   Blood: x / Protein: x / Nitrite: x   Leuk Esterase: x / RBC: x / WBC x   Sq Epi: x / Non Sq Epi: x / Bacteria: x

## 2024-01-05 NOTE — ADVANCED PRACTICE NURSE CONSULT - ASSESSMENT
Ileostomy draining small amount of yellow effluent. WOCN cleansed surrounding skin with cleansing wipe, applied skin prep, then an ostomy ring and one piece Coloplast convex ostomy pouching system.    Stomatized fistula functioning with copious amount of thick yellow effluent mixed with food particles. Perifistula skin friable and denuded. WOCN cleansed denuded skin with normal saline, applied stoma powder, sealed with skin prep, applied ostomy ring around the fistula, then applied an Dustin [id number] wound manager. Cut bottom of wound manager and applied a clip to accommodate emptying of thick effluent.     Ileostomy draining small amount of yellow effluent. WOCN cleansed surrounding skin with cleansing wipe, applied skin prep, then an ostomy ring and one piece Coloplast convex ostomy pouching system.    Stomatized fistula functioning with copious amount of thick yellow effluent mixed with food particles. Perifistula skin friable and denuded. WOCN cleansed denuded skin with normal saline, applied stoma powder, sealed with skin prep, applied ostomy ring around the fistula, then applied an Dustin [id number] wound manager. Cut bottom of wound manager and applied a clip to accommodate emptying of thick effluent.

## 2024-01-05 NOTE — PROGRESS NOTE ADULT - SUBJECTIVE AND OBJECTIVE BOX
S: No acute events overnight. Patient reports feeling very well this morning. He denies SOB, chest pain, nausea/vomiting. +leaking from Dustin pouch. No bleeding at site of granulation tissue.     O: ICU Vital Signs Last 24 Hrs  T(F): 98.4 (01-05-24 @ 08:32), Max: 98.7 (01-05-24 @ 01:01)  HR: 107 (01-05-24 @ 09:13) (93 - 107)  BP: 145/75 (01-05-24 @ 06:15) (123/74 - 163/78)  BP(mean): 103 (01-05-24 @ 06:15) (89 - 112)  ABP: --  RR: 22 (01-05-24 @ 09:13) (20 - 36)  SpO2: 97% (01-05-24 @ 09:13) (95% - 100%)    PHYSICAL EXAM:   General: NAD   Neuro: AOx3   ENT: mucus membrane moist  Cardiovascular: sinus tachycardia, no murmurs   Respiratory: CTAB  Gastrointestinal: soft, nontender and nondistended. No guarding or rebound. ECF pouch wound manager in place. PCT bilious.   Extremities: warm, no dependent edema, no calf tenderness    LABS:    01-05    136  |  102  |  39<H>  ----------------------------<  146<H>  4.5   |  25  |  1.42<H>    Ca    8.2<L>      05 Jan 2024 05:30  Phos  3.3     01-05  Mg     2.0     01-05    TPro  8.6<H>  /  Alb  2.0<L>  /  TBili  6.4<H>  /  DBili  4.3<H>  /  AST  90<H>  /  ALT  64<H>  /  AlkPhos  125<H>  01-05  LIVER FUNCTIONS - ( 05 Jan 2024 05:30 )  Alb: 2.0 g/dL / Pro: 8.6 g/dL / ALK PHOS: 125 U/L / ALT: 64 U/L / AST: 90 U/L / GGT: x                               9.3    8.06  )-----------( 176      ( 05 Jan 2024 05:30 )             29.3   PT/INR - ( 05 Jan 2024 05:30 )   PT: 15.8 sec;   INR: 1.40            Urinalysis Basic - ( 05 Jan 2024 05:30 )    Color: x / Appearance: x / SG: x / pH: x  Gluc: 146 mg/dL / Ketone: x  / Bili: x / Urobili: x   Blood: x / Protein: x / Nitrite: x   Leuk Esterase: x / RBC: x / WBC x   Sq Epi: x / Non Sq Epi: x / Bacteria: x    CAPILLARY BLOOD GLUCOSE        MEDICATIONS  (STANDING):  albumin human  5% IVPB 250 milliLiter(s) IV Intermittent once  chlorhexidine 2% Cloths 1 Application(s) Topical <User Schedule>  cholestyramine Powder (Sugar-Free) 4 Gram(s) Oral daily  EPINEPHrine   1 mG/mL (1:1,000) Topical Solution 1 Application(s) Topical once  EPINEPHrine   1 mG/mL (1:1,000) Topical Solution 1 Application(s) Topical daily  epoetin matt-epbx (RETACRIT) Injectable 39849 Unit(s) SubCutaneous every 7 days  ergocalciferol 27848 Unit(s) Oral <User Schedule>  gabapentin 100 milliGRAM(s) Oral at bedtime  glucagon  Injectable 1 milliGRAM(s) IntraMuscular once  heparin   Injectable 5000 Unit(s) SubCutaneous every 8 hours  levothyroxine Injectable 30 MICROGram(s) IV Push at bedtime  lipid, fat emulsion (Fish Oil and Plant Based) 20% Infusion 0.54 Gm/kG/Day (20.83 mL/Hr) IV Continuous <Continuous>  lipid, fat emulsion (Fish Oil and Plant Based) 20% Infusion 0.54 Gm/kG/Day (20.83 mL/Hr) IV Continuous <Continuous>  metoprolol tartrate Injectable 7.5 milliGRAM(s) IV Push every 4 hours  Parenteral Nutrition - Adult 1 Each TPN Continuous <Continuous>  Parenteral Nutrition - Adult 1 Each TPN Continuous <Continuous>  ursodiol Suspension 300 milliGRAM(s) Oral every 8 hours  venlafaxine XR. 150 milliGRAM(s) Oral daily    MEDICATIONS  (PRN):  chlorhexidine 0.12% Liquid 15 milliLiter(s) Swish and Spit two times a day PRN oral hygeine  hydrALAZINE Injectable 10 milliGRAM(s) IV Push every 6 hours PRN SBP >170  LORazepam   Injectable 1 milliGRAM(s) IV Push <User Schedule> PRN Anxiety  ondansetron Injectable 4 milliGRAM(s) IV Push every 6 hours PRN Nausea and/or Vomiting      Poole:	  [ ] None	[ ] Daily Poole Order Placed	   Indication:	  [ ] Strict I and O's    [ ] Obstruction     [ ] Incontinence + Stage 3 or 4 Decubitus  Central Line:  [ ] None	   [ ]  Medication / TPN Administration     [ ] No Peripheral IV        S: No acute events overnight. Patient reports feeling very well this morning. He denies SOB, chest pain, nausea/vomiting. +leaking from Dustin pouch. No bleeding at site of granulation tissue.     O: ICU Vital Signs Last 24 Hrs  T(F): 98.4 (01-05-24 @ 08:32), Max: 98.7 (01-05-24 @ 01:01)  HR: 107 (01-05-24 @ 09:13) (93 - 107)  BP: 145/75 (01-05-24 @ 06:15) (123/74 - 163/78)  BP(mean): 103 (01-05-24 @ 06:15) (89 - 112)  ABP: --  RR: 22 (01-05-24 @ 09:13) (20 - 36)  SpO2: 97% (01-05-24 @ 09:13) (95% - 100%)    PHYSICAL EXAM:   General: NAD   Neuro: AOx3   ENT: mucus membrane moist  Cardiovascular: sinus tachycardia, no murmurs   Respiratory: CTAB  Gastrointestinal: soft, nontender and nondistended. No guarding or rebound. ECF pouch wound manager in place. PCT bilious.   Extremities: warm, no dependent edema, no calf tenderness    LABS:    01-05    136  |  102  |  39<H>  ----------------------------<  146<H>  4.5   |  25  |  1.42<H>    Ca    8.2<L>      05 Jan 2024 05:30  Phos  3.3     01-05  Mg     2.0     01-05    TPro  8.6<H>  /  Alb  2.0<L>  /  TBili  6.4<H>  /  DBili  4.3<H>  /  AST  90<H>  /  ALT  64<H>  /  AlkPhos  125<H>  01-05  LIVER FUNCTIONS - ( 05 Jan 2024 05:30 )  Alb: 2.0 g/dL / Pro: 8.6 g/dL / ALK PHOS: 125 U/L / ALT: 64 U/L / AST: 90 U/L / GGT: x                               9.3    8.06  )-----------( 176      ( 05 Jan 2024 05:30 )             29.3   PT/INR - ( 05 Jan 2024 05:30 )   PT: 15.8 sec;   INR: 1.40            Urinalysis Basic - ( 05 Jan 2024 05:30 )    Color: x / Appearance: x / SG: x / pH: x  Gluc: 146 mg/dL / Ketone: x  / Bili: x / Urobili: x   Blood: x / Protein: x / Nitrite: x   Leuk Esterase: x / RBC: x / WBC x   Sq Epi: x / Non Sq Epi: x / Bacteria: x    CAPILLARY BLOOD GLUCOSE        MEDICATIONS  (STANDING):  albumin human  5% IVPB 250 milliLiter(s) IV Intermittent once  chlorhexidine 2% Cloths 1 Application(s) Topical <User Schedule>  cholestyramine Powder (Sugar-Free) 4 Gram(s) Oral daily  EPINEPHrine   1 mG/mL (1:1,000) Topical Solution 1 Application(s) Topical once  EPINEPHrine   1 mG/mL (1:1,000) Topical Solution 1 Application(s) Topical daily  epoetin matt-epbx (RETACRIT) Injectable 42494 Unit(s) SubCutaneous every 7 days  ergocalciferol 14434 Unit(s) Oral <User Schedule>  gabapentin 100 milliGRAM(s) Oral at bedtime  glucagon  Injectable 1 milliGRAM(s) IntraMuscular once  heparin   Injectable 5000 Unit(s) SubCutaneous every 8 hours  levothyroxine Injectable 30 MICROGram(s) IV Push at bedtime  lipid, fat emulsion (Fish Oil and Plant Based) 20% Infusion 0.54 Gm/kG/Day (20.83 mL/Hr) IV Continuous <Continuous>  lipid, fat emulsion (Fish Oil and Plant Based) 20% Infusion 0.54 Gm/kG/Day (20.83 mL/Hr) IV Continuous <Continuous>  metoprolol tartrate Injectable 7.5 milliGRAM(s) IV Push every 4 hours  Parenteral Nutrition - Adult 1 Each TPN Continuous <Continuous>  Parenteral Nutrition - Adult 1 Each TPN Continuous <Continuous>  ursodiol Suspension 300 milliGRAM(s) Oral every 8 hours  venlafaxine XR. 150 milliGRAM(s) Oral daily    MEDICATIONS  (PRN):  chlorhexidine 0.12% Liquid 15 milliLiter(s) Swish and Spit two times a day PRN oral hygeine  hydrALAZINE Injectable 10 milliGRAM(s) IV Push every 6 hours PRN SBP >170  LORazepam   Injectable 1 milliGRAM(s) IV Push <User Schedule> PRN Anxiety  ondansetron Injectable 4 milliGRAM(s) IV Push every 6 hours PRN Nausea and/or Vomiting      Poole:	  [ ] None	[ ] Daily Poole Order Placed	   Indication:	  [ ] Strict I and O's    [ ] Obstruction     [ ] Incontinence + Stage 3 or 4 Decubitus  Central Line:  [ ] None	   [ ]  Medication / TPN Administration     [ ] No Peripheral IV

## 2024-01-05 NOTE — PROGRESS NOTE ADULT - ASSESSMENT
The patient is a 78 year old male with a PMHx of Crohn's, AFib/Flutter s/p DCCVs on amiodarone, remote ileocectomy and open appendectomy. Admitted (6/23) for SBO vs Crohns flare, s/p NGT decompression and s/p lap converted to open TRE, SBR x 3, left in discontinuity with abthera vac on (6/27), RTOR for ileocolic resection, small bowel anastomosis, and abdominal wall closure on (6/28), c/b fluid collection s/p IR aspiration of perihepatic fluid on (7/3), c/b wound dehiscence s/p RTOR exlap, washout, ileocolic resection with end ileostomy, blow hole colostomy, red rubber from ileostomy to small bowel anastomosis; vicryl bridging mesh on (7/5) transferred to SICU postoperatively for hemodynamic monitoring, with hospital course complicated by periods of severe ELVI and hyponatremia, which resolved but stepped back up for to SICU on (9/10) for acute AMS, intermittent hypoglycemia, AFib with RVR. Percutaneous Cholecystostomy placed on (9/11) for enlarged GB, PCT capped 10/5 and uncapped 10/25 for hyperbilirubinemia, Right brachial DVT, left basillic and cephalic superficial thrombus on duplex 11/2, CT 11/14 with ALDEN ground glass opacity of unclear etiology, completed empiric 7day cefepime course 11/22, rising T-bili of unclear etiology now w resolved candida glabrata fungemia, ELVI improving.     - Continue regular diet and TPN   - Monitor I&Os   - Continue wound care   - Out of bed and ambulation   - Team 5 will follow

## 2024-01-05 NOTE — PROGRESS NOTE ADULT - SUBJECTIVE AND OBJECTIVE BOX
ON: POCUS on 1/4 showing bilateral effusions, Net negative 1925, urine output 875 and pouch 400      SUBJECTIVE: No new complaints except for intermittent bleeding on wound site    MEDICATIONS  (STANDING):  chlorhexidine 2% Cloths 1 Application(s) Topical <User Schedule>  cholestyramine Powder (Sugar-Free) 4 Gram(s) Oral daily  EPINEPHrine   1 mG/mL (1:1,000) Topical Solution 1 Application(s) Topical once  EPINEPHrine   1 mG/mL (1:1,000) Topical Solution 1 Application(s) Topical daily  epoetin matt-epbx (RETACRIT) Injectable 51682 Unit(s) SubCutaneous every 7 days  ergocalciferol 56639 Unit(s) Oral <User Schedule>  gabapentin 100 milliGRAM(s) Oral at bedtime  glucagon  Injectable 1 milliGRAM(s) IntraMuscular once  heparin   Injectable 5000 Unit(s) SubCutaneous every 8 hours  levothyroxine Injectable 30 MICROGram(s) IV Push at bedtime  lipid, fat emulsion (Fish Oil and Plant Based) 20% Infusion 0.54 Gm/kG/Day (20.83 mL/Hr) IV Continuous <Continuous>  metoprolol tartrate Injectable 7.5 milliGRAM(s) IV Push every 4 hours  Parenteral Nutrition - Adult 1 Each TPN Continuous <Continuous>  ursodiol Suspension 300 milliGRAM(s) Oral every 8 hours  venlafaxine XR. 150 milliGRAM(s) Oral daily    MEDICATIONS  (PRN):  chlorhexidine 0.12% Liquid 15 milliLiter(s) Swish and Spit two times a day PRN oral hygeine  hydrALAZINE Injectable 10 milliGRAM(s) IV Push every 6 hours PRN SBP >170  LORazepam   Injectable 1 milliGRAM(s) IV Push <User Schedule> PRN Anxiety  ondansetron Injectable 4 milliGRAM(s) IV Push every 6 hours PRN Nausea and/or Vomiting      Drips:     ICU Vital Signs Last 24 Hrs  T(C): 36.9 (05 Jan 2024 05:01), Max: 37.1 (05 Jan 2024 01:01)  T(F): 98.5 (05 Jan 2024 05:01), Max: 98.7 (05 Jan 2024 01:01)  HR: 107 (05 Jan 2024 06:17) (93 - 107)  BP: 149/69 (05 Jan 2024 03:10) (123/74 - 166/73)  BP(mean): 99 (05 Jan 2024 03:10) (89 - 112)  ABP: --  ABP(mean): --  RR: 28 (05 Jan 2024 06:17) (20 - 36)  SpO2: 96% (05 Jan 2024 06:17) (93% - 100%)    O2 Parameters below as of 05 Jan 2024 06:17  Patient On (Oxygen Delivery Method): room air      Physical Exam  General: Patient is doing well and lying in bed comfortably  Constitutional: alert and oriented   Pulm: Nonlabored breathing, no respiratory distress  CV: Regular rate and rhythm, normal sinus rhythm  Abd: soft, nontender, nondistended. No rebound, no guarding. Wound manager in place without leaks, dark brown/green output. Perc cony capped Ileostomy with minimal output.   Extremities: warm, well perfused, no edema      I&O's Summary    03 Jan 2024 07:01  -  04 Jan 2024 07:00  --------------------------------------------------------  IN: 3900.4 mL / OUT: 4515 mL / NET: -614.6 mL    04 Jan 2024 07:01  -  05 Jan 2024 06:45  --------------------------------------------------------  IN: 3185.4 mL / OUT: 3840 mL / NET: -654.6 mL        LABS:                        9.3    8.06  )-----------( 176      ( 05 Jan 2024 05:30 )             29.3     01-04    134<L>  |  101  |  40<H>  ----------------------------<  152<H>  4.5   |  26  |  1.40<H>    Ca    8.5      04 Jan 2024 05:30  Phos  2.8     01-04  Mg     2.0     01-04    TPro  8.5<H>  /  Alb  2.4<L>  /  TBili  6.6<H>  /  DBili  4.4<H>  /  AST  94<H>  /  ALT  67<H>  /  AlkPhos  124<H>  01-04    PT/INR - ( 05 Jan 2024 05:30 )   PT: 15.8 sec;   INR: 1.40            Urinalysis Basic - ( 04 Jan 2024 05:30 )    Color: x / Appearance: x / SG: x / pH: x  Gluc: 152 mg/dL / Ketone: x  / Bili: x / Urobili: x   Blood: x / Protein: x / Nitrite: x   Leuk Esterase: x / RBC: x / WBC x   Sq Epi: x / Non Sq Epi: x / Bacteria: x      CAPILLARY BLOOD GLUCOSE        LIVER FUNCTIONS - ( 04 Jan 2024 05:30 )  Alb: 2.4 g/dL / Pro: 8.5 g/dL / ALK PHOS: 124 U/L / ALT: 67 U/L / AST: 94 U/L / GGT: x             Cultures:    RADIOLOGY & ADDITIONAL STUDIES:     ON: POCUS on 1/4 showing bilateral effusions, Net negative 1925, urine output 875 and pouch 400      SUBJECTIVE: No new complaints except for intermittent bleeding on wound site    MEDICATIONS  (STANDING):  chlorhexidine 2% Cloths 1 Application(s) Topical <User Schedule>  cholestyramine Powder (Sugar-Free) 4 Gram(s) Oral daily  EPINEPHrine   1 mG/mL (1:1,000) Topical Solution 1 Application(s) Topical once  EPINEPHrine   1 mG/mL (1:1,000) Topical Solution 1 Application(s) Topical daily  epoetin matt-epbx (RETACRIT) Injectable 33968 Unit(s) SubCutaneous every 7 days  ergocalciferol 92769 Unit(s) Oral <User Schedule>  gabapentin 100 milliGRAM(s) Oral at bedtime  glucagon  Injectable 1 milliGRAM(s) IntraMuscular once  heparin   Injectable 5000 Unit(s) SubCutaneous every 8 hours  levothyroxine Injectable 30 MICROGram(s) IV Push at bedtime  lipid, fat emulsion (Fish Oil and Plant Based) 20% Infusion 0.54 Gm/kG/Day (20.83 mL/Hr) IV Continuous <Continuous>  metoprolol tartrate Injectable 7.5 milliGRAM(s) IV Push every 4 hours  Parenteral Nutrition - Adult 1 Each TPN Continuous <Continuous>  ursodiol Suspension 300 milliGRAM(s) Oral every 8 hours  venlafaxine XR. 150 milliGRAM(s) Oral daily    MEDICATIONS  (PRN):  chlorhexidine 0.12% Liquid 15 milliLiter(s) Swish and Spit two times a day PRN oral hygeine  hydrALAZINE Injectable 10 milliGRAM(s) IV Push every 6 hours PRN SBP >170  LORazepam   Injectable 1 milliGRAM(s) IV Push <User Schedule> PRN Anxiety  ondansetron Injectable 4 milliGRAM(s) IV Push every 6 hours PRN Nausea and/or Vomiting      Drips:     ICU Vital Signs Last 24 Hrs  T(C): 36.9 (05 Jan 2024 05:01), Max: 37.1 (05 Jan 2024 01:01)  T(F): 98.5 (05 Jan 2024 05:01), Max: 98.7 (05 Jan 2024 01:01)  HR: 107 (05 Jan 2024 06:17) (93 - 107)  BP: 149/69 (05 Jan 2024 03:10) (123/74 - 166/73)  BP(mean): 99 (05 Jan 2024 03:10) (89 - 112)  ABP: --  ABP(mean): --  RR: 28 (05 Jan 2024 06:17) (20 - 36)  SpO2: 96% (05 Jan 2024 06:17) (93% - 100%)    O2 Parameters below as of 05 Jan 2024 06:17  Patient On (Oxygen Delivery Method): room air      Physical Exam  General: Patient is doing well and lying in bed comfortably  Constitutional: alert and oriented   Pulm: Nonlabored breathing, no respiratory distress  CV: Regular rate and rhythm, normal sinus rhythm  Abd: soft, nontender, nondistended. No rebound, no guarding. Wound manager in place without leaks, dark brown/green output. Perc cony capped Ileostomy with minimal output.   Extremities: warm, well perfused, no edema      I&O's Summary    03 Jan 2024 07:01  -  04 Jan 2024 07:00  --------------------------------------------------------  IN: 3900.4 mL / OUT: 4515 mL / NET: -614.6 mL    04 Jan 2024 07:01  -  05 Jan 2024 06:45  --------------------------------------------------------  IN: 3185.4 mL / OUT: 3840 mL / NET: -654.6 mL        LABS:                        9.3    8.06  )-----------( 176      ( 05 Jan 2024 05:30 )             29.3     01-04    134<L>  |  101  |  40<H>  ----------------------------<  152<H>  4.5   |  26  |  1.40<H>    Ca    8.5      04 Jan 2024 05:30  Phos  2.8     01-04  Mg     2.0     01-04    TPro  8.5<H>  /  Alb  2.4<L>  /  TBili  6.6<H>  /  DBili  4.4<H>  /  AST  94<H>  /  ALT  67<H>  /  AlkPhos  124<H>  01-04    PT/INR - ( 05 Jan 2024 05:30 )   PT: 15.8 sec;   INR: 1.40            Urinalysis Basic - ( 04 Jan 2024 05:30 )    Color: x / Appearance: x / SG: x / pH: x  Gluc: 152 mg/dL / Ketone: x  / Bili: x / Urobili: x   Blood: x / Protein: x / Nitrite: x   Leuk Esterase: x / RBC: x / WBC x   Sq Epi: x / Non Sq Epi: x / Bacteria: x      CAPILLARY BLOOD GLUCOSE        LIVER FUNCTIONS - ( 04 Jan 2024 05:30 )  Alb: 2.4 g/dL / Pro: 8.5 g/dL / ALK PHOS: 124 U/L / ALT: 67 U/L / AST: 94 U/L / GGT: x             Cultures:    RADIOLOGY & ADDITIONAL STUDIES:

## 2024-01-06 NOTE — PROGRESS NOTE ADULT - SUBJECTIVE AND OBJECTIVE BOX
ON: AMRITA, BIPAP useed   1/5: given albumin 5% for I&O negative 1500. albumin 25% per Ra for hypoalbuminemia/ US DVT negative for thrombosis/ RIGHT PICC placed, LEFT PICC removed. wound pouch changed twice due to leakage. no bleeding.     POST-OP DAY: X s/p      SUBJECTIVE: Patient seen and examined bedside by Surgical resident.    heparin   Injectable 5000 Unit(s) SubCutaneous every 8 hours  hydrALAZINE Injectable 10 milliGRAM(s) IV Push every 6 hours PRN  metoprolol tartrate Injectable 7.5 milliGRAM(s) IV Push every 4 hours    MEDICATIONS  (PRN):  chlorhexidine 0.12% Liquid 15 milliLiter(s) Swish and Spit two times a day PRN oral hygeine  hydrALAZINE Injectable 10 milliGRAM(s) IV Push every 6 hours PRN SBP >170  LORazepam   Injectable 1 milliGRAM(s) IV Push <User Schedule> PRN Anxiety  ondansetron Injectable 4 milliGRAM(s) IV Push every 6 hours PRN Nausea and/or Vomiting      I&O's Detail    05 Jan 2024 07:01  -  06 Jan 2024 07:00  --------------------------------------------------------  IN:    Albumin 25%  -  50 mL: 100 mL    Albumin 5%  - 500 mL: 250 mL    Fat Emulsion (Fish Oil &amp; Plant Based) 20% Infusion: 208 mL    TPN (Total Parenteral Nutrition): 1656 mL  Total IN: 2214 mL    OUT:    Drain (mL): 1255 mL    Voided (mL): 2225 mL  Total OUT: 3480 mL    Total NET: -1266 mL      06 Jan 2024 07:01  -  06 Jan 2024 08:04  --------------------------------------------------------  IN:    Albumin 25%  -  50 mL: 50 mL    TPN (Total Parenteral Nutrition): 138 mL  Total IN: 188 mL    OUT:  Total OUT: 0 mL    Total NET: 188 mL          Vital Signs Last 24 Hrs  T(C): 36.7 (05 Jan 2024 22:06), Max: 36.9 (05 Jan 2024 08:32)  T(F): 98 (05 Jan 2024 22:06), Max: 98.4 (05 Jan 2024 08:32)  HR: 106 (06 Jan 2024 06:00) (103 - 108)  BP: 157/76 (06 Jan 2024 06:00) (131/72 - 164/79)  BP(mean): 109 (06 Jan 2024 06:00) (92 - 115)  RR: 22 (06 Jan 2024 06:00) (20 - 38)  SpO2: 98% (06 Jan 2024 06:00) (97% - 100%)    Parameters below as of 06 Jan 2024 06:00  Patient On (Oxygen Delivery Method): room air        General: NAD, resting comfortably in bed  C/V: NSR  Pulm: Nonlabored breathing, no respiratory distress  Abd: soft, NT/ND  Extrem: WWP, no edema, SCDs in place    LABS:                        9.3    8.06  )-----------( 176      ( 05 Jan 2024 05:30 )             29.3     01-05    136  |  102  |  39<H>  ----------------------------<  146<H>  4.5   |  25  |  1.42<H>    Ca    8.2<L>      05 Jan 2024 05:30  Phos  3.3     01-05  Mg     2.0     01-05    TPro  8.6<H>  /  Alb  2.0<L>  /  TBili  6.4<H>  /  DBili  4.3<H>  /  AST  90<H>  /  ALT  64<H>  /  AlkPhos  125<H>  01-05    PT/INR - ( 05 Jan 2024 05:30 )   PT: 15.8 sec;   INR: 1.40            Urinalysis Basic - ( 05 Jan 2024 05:30 )    Color: x / Appearance: x / SG: x / pH: x  Gluc: 146 mg/dL / Ketone: x  / Bili: x / Urobili: x   Blood: x / Protein: x / Nitrite: x   Leuk Esterase: x / RBC: x / WBC x   Sq Epi: x / Non Sq Epi: x / Bacteria: x        RADIOLOGY & ADDITIONAL STUDIES:     ON: AMRITA, BIPAP useed   1/5: given albumin 5% for I&O negative 1500. albumin 25% per Ra for hypoalbuminemia/ US DVT negative for thrombosis/ RIGHT PICC placed, LEFT PICC removed. wound pouch changed twice due to leakage. no bleeding.        SUBJECTIVE: Patient seen and examined bedside by Surgical resident. No new complaints denies, bleeding at wound site, leaking around ostomy site     heparin   Injectable 5000 Unit(s) SubCutaneous every 8 hours  hydrALAZINE Injectable 10 milliGRAM(s) IV Push every 6 hours PRN  metoprolol tartrate Injectable 7.5 milliGRAM(s) IV Push every 4 hours    MEDICATIONS  (PRN):  chlorhexidine 0.12% Liquid 15 milliLiter(s) Swish and Spit two times a day PRN oral hygeine  hydrALAZINE Injectable 10 milliGRAM(s) IV Push every 6 hours PRN SBP >170  LORazepam   Injectable 1 milliGRAM(s) IV Push <User Schedule> PRN Anxiety  ondansetron Injectable 4 milliGRAM(s) IV Push every 6 hours PRN Nausea and/or Vomiting      I&O's Detail    05 Jan 2024 07:01  -  06 Jan 2024 07:00  --------------------------------------------------------  IN:    Albumin 25%  -  50 mL: 100 mL    Albumin 5%  - 500 mL: 250 mL    Fat Emulsion (Fish Oil &amp; Plant Based) 20% Infusion: 208 mL    TPN (Total Parenteral Nutrition): 1656 mL  Total IN: 2214 mL    OUT:    Drain (mL): 1255 mL    Voided (mL): 2225 mL  Total OUT: 3480 mL    Total NET: -1266 mL      06 Jan 2024 07:01  -  06 Jan 2024 08:04  --------------------------------------------------------  IN:    Albumin 25%  -  50 mL: 50 mL    TPN (Total Parenteral Nutrition): 138 mL  Total IN: 188 mL    OUT:  Total OUT: 0 mL    Total NET: 188 mL          Vital Signs Last 24 Hrs  T(C): 36.7 (05 Jan 2024 22:06), Max: 36.9 (05 Jan 2024 08:32)  T(F): 98 (05 Jan 2024 22:06), Max: 98.4 (05 Jan 2024 08:32)  HR: 106 (06 Jan 2024 06:00) (103 - 108)  BP: 157/76 (06 Jan 2024 06:00) (131/72 - 164/79)  BP(mean): 109 (06 Jan 2024 06:00) (92 - 115)  RR: 22 (06 Jan 2024 06:00) (20 - 38)  SpO2: 98% (06 Jan 2024 06:00) (97% - 100%)    Parameters below as of 06 Jan 2024 06:00  Patient On (Oxygen Delivery Method): room air        Physical Exam  General: Patient is doing well and lying in bed comfortably  Constitutional: alert and oriented   Pulm: Nonlabored breathing, no respiratory distress  CV: Regular rate and rhythm, normal sinus rhythm  Abd: soft, nontender, nondistended. No rebound, no guarding. Wound manager in place without leaks, dark brown/green output. Perc cony capped Ileostomy with output, leaking around medial edge.   Extremities: warm, well perfused, no edema    LABS:                        9.3    8.06  )-----------( 176      ( 05 Jan 2024 05:30 )             29.3     01-05    136  |  102  |  39<H>  ----------------------------<  146<H>  4.5   |  25  |  1.42<H>    Ca    8.2<L>      05 Jan 2024 05:30  Phos  3.3     01-05  Mg     2.0     01-05    TPro  8.6<H>  /  Alb  2.0<L>  /  TBili  6.4<H>  /  DBili  4.3<H>  /  AST  90<H>  /  ALT  64<H>  /  AlkPhos  125<H>  01-05    PT/INR - ( 05 Jan 2024 05:30 )   PT: 15.8 sec;   INR: 1.40            Urinalysis Basic - ( 05 Jan 2024 05:30 )    Color: x / Appearance: x / SG: x / pH: x  Gluc: 146 mg/dL / Ketone: x  / Bili: x / Urobili: x   Blood: x / Protein: x / Nitrite: x   Leuk Esterase: x / RBC: x / WBC x   Sq Epi: x / Non Sq Epi: x / Bacteria: x        RADIOLOGY & ADDITIONAL STUDIES:

## 2024-01-06 NOTE — PROGRESS NOTE ADULT - SUBJECTIVE AND OBJECTIVE BOX
S: No new issues/events overnight, no new med c/o    O: ICU Vital Signs Last 24 Hrs  T(F): 98 (01-05-24 @ 22:06), Max: 98 (01-05-24 @ 22:06)  HR: 106 (01-06-24 @ 06:00) (103 - 106)  BP: 157/76 (01-06-24 @ 06:00) (131/72 - 164/79)  BP(mean): 109 (01-06-24 @ 06:00) (96 - 115)  ABP: --  RR: 22 (01-06-24 @ 06:00) (20 - 38)  SpO2: 98% (01-06-24 @ 06:00) (97% - 100%)    PHYSICAL EXAM:   Neurological: AAOx3, CNII-XII intact,  strength 5/5 b/l  ENT: mucus membrane moist, icterus  Cardiovascular: RRR  Respiratory: CTA  Gastrointestinal: soft, NT, ND, BS+, fistula with grayish greenish output with smear of blood.   Extremities: warm, no dependent edema  Vascular: no cyanosis/erythema  Skin: no rashes, Jaundice.   MSK: no joint swelling.     LABS:    01-05    136  |  102  |  39<H>  ----------------------------<  146<H>  4.5   |  25  |  1.42<H>    Ca    8.2<L>      05 Jan 2024 05:30  Phos  3.3     01-05  Mg     2.0     01-05    TPro  8.6<H>  /  Alb  2.0<L>  /  TBili  6.4<H>  /  DBili  4.3<H>  /  AST  90<H>  /  ALT  64<H>  /  AlkPhos  125<H>  01-05  LIVER FUNCTIONS - ( 05 Jan 2024 05:30 )  Alb: 2.0 g/dL / Pro: 8.6 g/dL / ALK PHOS: 125 U/L / ALT: 64 U/L / AST: 90 U/L / GGT: x                               9.3    8.06  )-----------( 176      ( 05 Jan 2024 05:30 )             29.3   PT/INR - ( 05 Jan 2024 05:30 )   PT: 15.8 sec;   INR: 1.40            Urinalysis Basic - ( 05 Jan 2024 05:30 )    Color: x / Appearance: x / SG: x / pH: x  Gluc: 146 mg/dL / Ketone: x  / Bili: x / Urobili: x   Blood: x / Protein: x / Nitrite: x   Leuk Esterase: x / RBC: x / WBC x   Sq Epi: x / Non Sq Epi: x / Bacteria: x    CAPILLARY BLOOD GLUCOSE        MEDICATIONS  (STANDING):  albumin human 25% IVPB 50 milliLiter(s) IV Intermittent every 8 hours  chlorhexidine 2% Cloths 1 Application(s) Topical <User Schedule>  cholestyramine Powder (Sugar-Free) 4 Gram(s) Oral daily  EPINEPHrine   1 mG/mL (1:1,000) Topical Solution 1 Application(s) Topical once  EPINEPHrine   1 mG/mL (1:1,000) Topical Solution 1 Application(s) Topical daily  epoetin matt-epbx (RETACRIT) Injectable 70610 Unit(s) SubCutaneous every 7 days  ergocalciferol 42854 Unit(s) Oral <User Schedule>  gabapentin 100 milliGRAM(s) Oral at bedtime  glucagon  Injectable 1 milliGRAM(s) IntraMuscular once  heparin   Injectable 5000 Unit(s) SubCutaneous every 8 hours  lactated ringers Bolus 1000 milliLiter(s) IV Bolus once  levothyroxine Injectable 30 MICROGram(s) IV Push at bedtime  lipid, fat emulsion (Fish Oil and Plant Based) 20% Infusion 0.54 Gm/kG/Day (20.83 mL/Hr) IV Continuous <Continuous>  lipid, fat emulsion (Fish Oil and Plant Based) 20% Infusion 0.54 Gm/kG/Day (20.83 mL/Hr) IV Continuous <Continuous>  metoprolol tartrate Injectable 10 milliGRAM(s) IV Push every 4 hours  Parenteral Nutrition - Adult 1 Each TPN Continuous <Continuous>  Parenteral Nutrition - Adult 1 Each TPN Continuous <Continuous>  ursodiol Suspension 300 milliGRAM(s) Oral every 8 hours  venlafaxine XR. 150 milliGRAM(s) Oral daily    MEDICATIONS  (PRN):  chlorhexidine 0.12% Liquid 15 milliLiter(s) Swish and Spit two times a day PRN oral hygeine  hydrALAZINE Injectable 10 milliGRAM(s) IV Push every 6 hours PRN SBP >170  LORazepam   Injectable 1 milliGRAM(s) IV Push <User Schedule> PRN Anxiety  ondansetron Injectable 4 milliGRAM(s) IV Push every 6 hours PRN Nausea and/or Vomiting      Poole:	  [ x] None	[ ] Daily Poole Order Placed	   Indication:	  [ ] Strict I and O's    [ ] Obstruction     [ ] Incontinence + Stage 3 or 4 Decubitus  Central Line:  [ ] None	   [ x]  Medication / TPN Administration     [ ] No Peripheral IV        S: No new issues/events overnight, no new med c/o    O: ICU Vital Signs Last 24 Hrs  T(F): 98 (01-05-24 @ 22:06), Max: 98 (01-05-24 @ 22:06)  HR: 106 (01-06-24 @ 06:00) (103 - 106)  BP: 157/76 (01-06-24 @ 06:00) (131/72 - 164/79)  BP(mean): 109 (01-06-24 @ 06:00) (96 - 115)  ABP: --  RR: 22 (01-06-24 @ 06:00) (20 - 38)  SpO2: 98% (01-06-24 @ 06:00) (97% - 100%)    PHYSICAL EXAM:   Neurological: AAOx3, CNII-XII intact,  strength 5/5 b/l  ENT: mucus membrane moist, icterus  Cardiovascular: RRR  Respiratory: CTA  Gastrointestinal: soft, NT, ND, BS+, fistula with grayish greenish output with smear of blood.   Extremities: warm, no dependent edema  Vascular: no cyanosis/erythema  Skin: no rashes, Jaundice.   MSK: no joint swelling.     LABS:    01-05    136  |  102  |  39<H>  ----------------------------<  146<H>  4.5   |  25  |  1.42<H>    Ca    8.2<L>      05 Jan 2024 05:30  Phos  3.3     01-05  Mg     2.0     01-05    TPro  8.6<H>  /  Alb  2.0<L>  /  TBili  6.4<H>  /  DBili  4.3<H>  /  AST  90<H>  /  ALT  64<H>  /  AlkPhos  125<H>  01-05  LIVER FUNCTIONS - ( 05 Jan 2024 05:30 )  Alb: 2.0 g/dL / Pro: 8.6 g/dL / ALK PHOS: 125 U/L / ALT: 64 U/L / AST: 90 U/L / GGT: x                               9.3    8.06  )-----------( 176      ( 05 Jan 2024 05:30 )             29.3   PT/INR - ( 05 Jan 2024 05:30 )   PT: 15.8 sec;   INR: 1.40            Urinalysis Basic - ( 05 Jan 2024 05:30 )    Color: x / Appearance: x / SG: x / pH: x  Gluc: 146 mg/dL / Ketone: x  / Bili: x / Urobili: x   Blood: x / Protein: x / Nitrite: x   Leuk Esterase: x / RBC: x / WBC x   Sq Epi: x / Non Sq Epi: x / Bacteria: x    CAPILLARY BLOOD GLUCOSE        MEDICATIONS  (STANDING):  albumin human 25% IVPB 50 milliLiter(s) IV Intermittent every 8 hours  chlorhexidine 2% Cloths 1 Application(s) Topical <User Schedule>  cholestyramine Powder (Sugar-Free) 4 Gram(s) Oral daily  EPINEPHrine   1 mG/mL (1:1,000) Topical Solution 1 Application(s) Topical once  EPINEPHrine   1 mG/mL (1:1,000) Topical Solution 1 Application(s) Topical daily  epoetin matt-epbx (RETACRIT) Injectable 93318 Unit(s) SubCutaneous every 7 days  ergocalciferol 16521 Unit(s) Oral <User Schedule>  gabapentin 100 milliGRAM(s) Oral at bedtime  glucagon  Injectable 1 milliGRAM(s) IntraMuscular once  heparin   Injectable 5000 Unit(s) SubCutaneous every 8 hours  lactated ringers Bolus 1000 milliLiter(s) IV Bolus once  levothyroxine Injectable 30 MICROGram(s) IV Push at bedtime  lipid, fat emulsion (Fish Oil and Plant Based) 20% Infusion 0.54 Gm/kG/Day (20.83 mL/Hr) IV Continuous <Continuous>  lipid, fat emulsion (Fish Oil and Plant Based) 20% Infusion 0.54 Gm/kG/Day (20.83 mL/Hr) IV Continuous <Continuous>  metoprolol tartrate Injectable 10 milliGRAM(s) IV Push every 4 hours  Parenteral Nutrition - Adult 1 Each TPN Continuous <Continuous>  Parenteral Nutrition - Adult 1 Each TPN Continuous <Continuous>  ursodiol Suspension 300 milliGRAM(s) Oral every 8 hours  venlafaxine XR. 150 milliGRAM(s) Oral daily    MEDICATIONS  (PRN):  chlorhexidine 0.12% Liquid 15 milliLiter(s) Swish and Spit two times a day PRN oral hygeine  hydrALAZINE Injectable 10 milliGRAM(s) IV Push every 6 hours PRN SBP >170  LORazepam   Injectable 1 milliGRAM(s) IV Push <User Schedule> PRN Anxiety  ondansetron Injectable 4 milliGRAM(s) IV Push every 6 hours PRN Nausea and/or Vomiting      Poole:	  [ x] None	[ ] Daily Poole Order Placed	   Indication:	  [ ] Strict I and O's    [ ] Obstruction     [ ] Incontinence + Stage 3 or 4 Decubitus  Central Line:  [ ] None	   [ x]  Medication / TPN Administration     [ ] No Peripheral IV

## 2024-01-06 NOTE — PROGRESS NOTE ADULT - ASSESSMENT
78M w PMH Crohn's, AFib/Flutter s/p DCCVs on amiodarone, remote ileocectomy and open appendectomy. Admitted (6/23) for SBO vs Crohns flare, s/p NGT decompression and s/p lap converted to open TRE, SBR x 3, left in discontinuity with abthera vac on (6/27), RTOR for ileocolic resection, small bowel anastomosis, and abdominal wall closure on (6/28), c/b fluid collection s/p IR aspiration of perihepatic fluid on (7/3), c/b wound dehiscence s/p RTOR exlap, washout, ileocolic resection with end ileostomy, blow hole colostomy, red rubber from ileostomy to small bowel anastomosis; vicryl bridging mesh on (7/5) transferred to SICU postoperatively for hemodynamic monitoring, with hospital course complicated by periods of severe ELVI and hyponatremia, which resolved but stepped back up for to SICU on (9/10) for acute AMS, intermittent hypoglycemia, AFib with RVR. Percutaneous Cholecystostomy placed on (9/11) for enlarged GB, PCT capped 10/5 and uncapped 10/25 for hyperbilirubinemia, Right brachial DVT, left basillic and cephalic superficial thrombus on duplex 11/2, CT 11/14 with ALDEN ground glass opacity of unclear etiology, completed empiric 7day cefepime course 11/22, rising T-bili of unclear etilogy, now w resolved candida glabrata fungemia, ELVI improving.     NEURO: Hx of depression: cont Effexor (halved dose in setting of renal dysfunx). Anxiety: Lorazepam PRN. Holistic consult for anxiety and insomnia. Gabapentin for restless leg syndrome.   CV: borderline sinus tachycardia (HR 100s), BLE duplex yesterday (1/5) no DVT,  incr Metoprolol from 7.5mgIV  to 10q4hr for better heart rate control. getting 25% albumin q8h x 3 days for hypoalbuminemia (1/5-1/8). Hx Afib/flutter: s/p previous DCCV, off Amio and lipitor due to persistent transaminitis. TTE  (7/18) - PASP 64mmHg, EF 65-70%. holding Losartan & norvasc because not absorbing PO meds, hydralazine PRN. TTE (1/4) LVEF 75%, mild/mod AR, PASP 40, RVEF wnl. POCUS chronic bilateral effusions.   PULM: Atelectasis/dyspnea improved clinically: cont 1 hr of BiPAP every 12hrs of nasal canula or room air. Encourage OOB and IS.  CT from 11/14 with ALDEN ground glass opacity of unclear etiology. COVID negative. RVP negative. completed 7d empiric cefepime 11/22 to cover for potential PNA.   GI/FEN: Low res, low fat, high protein diet. Cont Ensure max x1/day. Folate, thiamine. PICC replaced 12/4, will switch out PICC today on 1/4. Continue TPN/lipids. High output EC fistula: cont Octreotide & Cholestyramine 10/18. Transaminitis elevated T bili of unclear origin, s/p Perc Deb on 9/11, failed capping trial. Repeat capping trial 1/2, no elevation in Tbili to date; seen by Hepatology - MRCP 10/30 no obstruction, cont ursodiol, RUQ US 11/25 CBD 5mm, hepatic vessels patent  : Voids. ELVI improved: stopped famotidine given renal dysfunction. MARLO Duplex and RP US unremarkable, CK wnl.    ENDO: Hx hypothyroid: IV Synthroid, TSH low on 11/30, decreased synthroid dose, repeat TSH 12/6 wnl. Repeat thryoid studies WNL 1/3/24.   ID: currently not on any abx/antifungals. BCx 11/22 grew candida glabrata, likely CLABSI. s/p Caspo & completed Fluconazole course (12/1-12/9). Ophthalmology evaluated - normal ocular exam. Echo no vegetation. PICC replaced on 12/4. Cont Nystatin powder. // DC: caspofungin (11/24-12/1). empiric 7days cefepime (11/15-11/22), C. tertium, Lactobacillis from IR cx 7/3, and candida albicans, lactobacillus, vanc sensitive E faecium, vanc resistant E gallinarum, vanc resistant E casseliflavus, lactobacillus from OR cx 7/5. Completed course of abx with Imipenem (9/10-9/15). Imipenem (8/26--) imipenem (6/30-7/12, 7/23-7/30), Dapto (6/30-7/5 and 7/23-7/24). Empiric dapto (8/23-24) and cefepime (8/23-24).   PPX: SCDs, SQH ppx was on Therapeutic lovenox bid for R brachial vein DVT, but will hold off on anticoagulation since repeat US Duplex negative.  HEME: Anemia - continue Epogen weekly. S/p Iron Sucrose 200 qd x 3 days for chronic anemia.   LINES: PIVs, New RUE PICC placed yesterday (1/5--) will plan to switch PICC once a month secondary to history of fungemia, OLD LUE PICC removed (12/4 - 1/5) // DC: LUE PICC (9/1-11/1 ), RUE PICC: (11/1-11/24)    WOUNDS/DRAINS: Abdominal wound and fistula with wound manager in place dressing change daily. had recurrent punctate bleeding at wound bed, s/p placed surgicel, attempted to stitch, electrocaudery and epi soaked gauze. RLQ Ostomy. IR perc deb tube capped 1/2. . // DC: L Clay drain.  PT: Ambulate as tolerated OOB to chair daily.  DISPO: SICU due to complicated hospital course.

## 2024-01-06 NOTE — PROGRESS NOTE ADULT - ASSESSMENT
78 year old male with a PMHx of Crohn's, AFib/Flutter s/p DCCVs on amiodarone, remote ileocectomy and open appendectomy. Admitted (6/23) for SBO vs Crohns flare, s/p NGT decompression and s/p lap converted to open TRE, SBR x 3, left in discontinuity with abthera vac on (6/27), RTOR for ileocolic resection, small bowel anastomosis, and abdominal wall closure on (6/28), c/b fluid collection s/p IR aspiration of perihepatic fluid on (7/3), c/b wound dehiscence s/p RTOR exlap, washout, ileocolic resection with end ileostomy, blow hole colostomy, red rubber from ileostomy to small bowel anastomosis; vicryl bridging mesh on (7/5) transferred to SICU postoperatively for hemodynamic monitoring, with hospital course complicated by periods of severe ELVI and hyponatremia, which resolved but stepped back up for to SICU on (9/10) for acute AMS, intermittent hypoglycemia, AFib with RVR. Percutaneous Cholecystostomy placed on (9/11) for enlarged GB, PCT capped 10/5 and uncapped 10/25 for hyperbilirubinemia, Right brachial DVT, left basillic and cephalic superficial thrombus on duplex 11/2, CT 11/14 with ALDEN ground glass opacity of unclear etiology, completed empiric 7day cefepime course 11/22, rising T-bili of unclear etiology now w resolved candida glabrata fungemia, ELVI improving.     - Continue regular diet and TPN   - Monitor I&Os   - Continue wound care   - Out of bed and ambulation   - Team 5 will follow

## 2024-01-07 NOTE — PROGRESS NOTE ADULT - SUBJECTIVE AND OBJECTIVE BOX
Overnight events: No acute overnight events.     SUBJECTIVE: Patient seen at bedside with chief resident. Patient sleeping soundly.       MEDICATIONS  (STANDING):  albumin human 25% IVPB 50 milliLiter(s) IV Intermittent every 8 hours  chlorhexidine 2% Cloths 1 Application(s) Topical <User Schedule>  cholestyramine Powder (Sugar-Free) 4 Gram(s) Oral daily  EPINEPHrine   1 mG/mL (1:1,000) Topical Solution 1 Application(s) Topical once  EPINEPHrine   1 mG/mL (1:1,000) Topical Solution 1 Application(s) Topical daily  epoetin matt-epbx (RETACRIT) Injectable 99422 Unit(s) SubCutaneous every 7 days  gabapentin 100 milliGRAM(s) Oral at bedtime  glucagon  Injectable 1 milliGRAM(s) IntraMuscular once  heparin   Injectable 5000 Unit(s) SubCutaneous every 8 hours  levothyroxine Injectable 30 MICROGram(s) IV Push at bedtime  lipid, fat emulsion (Fish Oil and Plant Based) 20% Infusion 0.54 Gm/kG/Day (20.83 mL/Hr) IV Continuous <Continuous>  lipid, fat emulsion (Fish Oil and Plant Based) 20% Infusion 0.54 Gm/kG/Day (20.83 mL/Hr) IV Continuous <Continuous>  metoprolol tartrate Injectable 10 milliGRAM(s) IV Push every 4 hours  Parenteral Nutrition - Adult 1 Each TPN Continuous <Continuous>  Parenteral Nutrition - Adult 1 Each TPN Continuous <Continuous>  ursodiol Suspension 300 milliGRAM(s) Oral every 8 hours  venlafaxine XR. 150 milliGRAM(s) Oral daily    MEDICATIONS  (PRN):  chlorhexidine 0.12% Liquid 15 milliLiter(s) Swish and Spit two times a day PRN oral hygeine  hydrALAZINE Injectable 10 milliGRAM(s) IV Push every 6 hours PRN SBP >170  LORazepam   Injectable 1 milliGRAM(s) IV Push <User Schedule> PRN Anxiety  ondansetron Injectable 4 milliGRAM(s) IV Push every 6 hours PRN Nausea and/or Vomiting      Vital Signs Last 24 Hrs  T(C): 36.4 (07 Jan 2024 05:07), Max: 36.4 (07 Jan 2024 05:07)  T(F): 97.5 (07 Jan 2024 05:07), Max: 97.5 (07 Jan 2024 05:07)  HR: 106 (07 Jan 2024 06:10) (103 - 106)  BP: 147/74 (07 Jan 2024 06:10) (146/80 - 182/87)  BP(mean): 102 (07 Jan 2024 06:10) (102 - 125)  RR: 22 (07 Jan 2024 06:10) (20 - 29)  SpO2: 94% (07 Jan 2024 06:10) (94% - 100%)    Parameters below as of 07 Jan 2024 06:10  Patient On (Oxygen Delivery Method): room air        Physical Exam  General: Patient is doing well and lying in bed comfortably  Constitutional: alert and oriented   Pulm: Nonlabored breathing, no respiratory distress  CV: Regular rate and rhythm, normal sinus rhythm  Abd: soft, nontender, nondistended. No rebound, no guarding. Wound manager in place without leaks, dark brown/green output. Perc cony capped Ileostomy with output  Extremities: warm, well perfused, no edema    I&O's Summary    06 Jan 2024 07:01  -  07 Jan 2024 07:00  --------------------------------------------------------  IN: 4479.6 mL / OUT: 4415 mL / NET: 64.6 mL    07 Jan 2024 07:01  -  07 Jan 2024 08:20  --------------------------------------------------------  IN: 138 mL / OUT: 0 mL / NET: 138 mL        LABS:                        8.6    8.16  )-----------( 184      ( 07 Jan 2024 05:40 )             27.2     01-07    137  |  104  |  37<H>  ----------------------------<  132<H>  4.4   |  24  |  1.33<H>    Ca    8.4      07 Jan 2024 05:40  Phos  3.7     01-07  Mg     2.0     01-07    TPro  8.5<H>  /  Alb  2.5<L>  /  TBili  6.4<H>  /  DBili  4.1<H>  /  AST  87<H>  /  ALT  57<H>  /  AlkPhos  110  01-07      Urinalysis Basic - ( 07 Jan 2024 05:40 )    Color: x / Appearance: x / SG: x / pH: x  Gluc: 132 mg/dL / Ketone: x  / Bili: x / Urobili: x   Blood: x / Protein: x / Nitrite: x   Leuk Esterase: x / RBC: x / WBC x   Sq Epi: x / Non Sq Epi: x / Bacteria: x      CAPILLARY BLOOD GLUCOSE        LIVER FUNCTIONS - ( 07 Jan 2024 05:40 )  Alb: 2.5 g/dL / Pro: 8.5 g/dL / ALK PHOS: 110 U/L / ALT: 57 U/L / AST: 87 U/L / GGT: x             RADIOLOGY & ADDITIONAL STUDIES:   Overnight events: No acute overnight events.     SUBJECTIVE: Patient seen at bedside with chief resident. Patient sleeping soundly.       MEDICATIONS  (STANDING):  albumin human 25% IVPB 50 milliLiter(s) IV Intermittent every 8 hours  chlorhexidine 2% Cloths 1 Application(s) Topical <User Schedule>  cholestyramine Powder (Sugar-Free) 4 Gram(s) Oral daily  EPINEPHrine   1 mG/mL (1:1,000) Topical Solution 1 Application(s) Topical once  EPINEPHrine   1 mG/mL (1:1,000) Topical Solution 1 Application(s) Topical daily  epoetin matt-epbx (RETACRIT) Injectable 67395 Unit(s) SubCutaneous every 7 days  gabapentin 100 milliGRAM(s) Oral at bedtime  glucagon  Injectable 1 milliGRAM(s) IntraMuscular once  heparin   Injectable 5000 Unit(s) SubCutaneous every 8 hours  levothyroxine Injectable 30 MICROGram(s) IV Push at bedtime  lipid, fat emulsion (Fish Oil and Plant Based) 20% Infusion 0.54 Gm/kG/Day (20.83 mL/Hr) IV Continuous <Continuous>  lipid, fat emulsion (Fish Oil and Plant Based) 20% Infusion 0.54 Gm/kG/Day (20.83 mL/Hr) IV Continuous <Continuous>  metoprolol tartrate Injectable 10 milliGRAM(s) IV Push every 4 hours  Parenteral Nutrition - Adult 1 Each TPN Continuous <Continuous>  Parenteral Nutrition - Adult 1 Each TPN Continuous <Continuous>  ursodiol Suspension 300 milliGRAM(s) Oral every 8 hours  venlafaxine XR. 150 milliGRAM(s) Oral daily    MEDICATIONS  (PRN):  chlorhexidine 0.12% Liquid 15 milliLiter(s) Swish and Spit two times a day PRN oral hygeine  hydrALAZINE Injectable 10 milliGRAM(s) IV Push every 6 hours PRN SBP >170  LORazepam   Injectable 1 milliGRAM(s) IV Push <User Schedule> PRN Anxiety  ondansetron Injectable 4 milliGRAM(s) IV Push every 6 hours PRN Nausea and/or Vomiting      Vital Signs Last 24 Hrs  T(C): 36.4 (07 Jan 2024 05:07), Max: 36.4 (07 Jan 2024 05:07)  T(F): 97.5 (07 Jan 2024 05:07), Max: 97.5 (07 Jan 2024 05:07)  HR: 106 (07 Jan 2024 06:10) (103 - 106)  BP: 147/74 (07 Jan 2024 06:10) (146/80 - 182/87)  BP(mean): 102 (07 Jan 2024 06:10) (102 - 125)  RR: 22 (07 Jan 2024 06:10) (20 - 29)  SpO2: 94% (07 Jan 2024 06:10) (94% - 100%)    Parameters below as of 07 Jan 2024 06:10  Patient On (Oxygen Delivery Method): room air        Physical Exam  General: Patient is doing well and lying in bed comfortably  Constitutional: alert and oriented   Pulm: Nonlabored breathing, no respiratory distress  CV: Regular rate and rhythm, normal sinus rhythm  Abd: soft, nontender, nondistended. No rebound, no guarding. Wound manager in place without leaks, dark brown/green output. Perc cony capped Ileostomy with output  Extremities: warm, well perfused, no edema    I&O's Summary    06 Jan 2024 07:01  -  07 Jan 2024 07:00  --------------------------------------------------------  IN: 4479.6 mL / OUT: 4415 mL / NET: 64.6 mL    07 Jan 2024 07:01  -  07 Jan 2024 08:20  --------------------------------------------------------  IN: 138 mL / OUT: 0 mL / NET: 138 mL        LABS:                        8.6    8.16  )-----------( 184      ( 07 Jan 2024 05:40 )             27.2     01-07    137  |  104  |  37<H>  ----------------------------<  132<H>  4.4   |  24  |  1.33<H>    Ca    8.4      07 Jan 2024 05:40  Phos  3.7     01-07  Mg     2.0     01-07    TPro  8.5<H>  /  Alb  2.5<L>  /  TBili  6.4<H>  /  DBili  4.1<H>  /  AST  87<H>  /  ALT  57<H>  /  AlkPhos  110  01-07      Urinalysis Basic - ( 07 Jan 2024 05:40 )    Color: x / Appearance: x / SG: x / pH: x  Gluc: 132 mg/dL / Ketone: x  / Bili: x / Urobili: x   Blood: x / Protein: x / Nitrite: x   Leuk Esterase: x / RBC: x / WBC x   Sq Epi: x / Non Sq Epi: x / Bacteria: x      CAPILLARY BLOOD GLUCOSE        LIVER FUNCTIONS - ( 07 Jan 2024 05:40 )  Alb: 2.5 g/dL / Pro: 8.5 g/dL / ALK PHOS: 110 U/L / ALT: 57 U/L / AST: 87 U/L / GGT: x             RADIOLOGY & ADDITIONAL STUDIES:

## 2024-01-07 NOTE — PROGRESS NOTE ADULT - ASSESSMENT
78M w PMH Crohn's, AFib/Flutter s/p DCCVs on amiodarone, remote ileocectomy and open appendectomy. Admitted (6/23) for SBO vs Crohns flare, s/p NGT decompression and s/p lap converted to open TRE, SBR x 3, left in discontinuity with abthera vac on (6/27), RTOR for ileocolic resection, small bowel anastomosis, and abdominal wall closure on (6/28), c/b fluid collection s/p IR aspiration of perihepatic fluid on (7/3), c/b wound dehiscence s/p RTOR exlap, washout, ileocolic resection with end ileostomy, blow hole colostomy, red rubber from ileostomy to small bowel anastomosis; vicryl bridging mesh on (7/5) transferred to SICU postoperatively for hemodynamic monitoring, with hospital course complicated by periods of severe ELVI and hyponatremia, which resolved but stepped back up for to SICU on (9/10) for acute AMS, intermittent hypoglycemia, AFib with RVR. Percutaneous Cholecystostomy placed on (9/11) for enlarged GB, PCT capped 10/5 and uncapped 10/25 for hyperbilirubinemia, Right brachial DVT, left basillic and cephalic superficial thrombus on duplex 11/2, CT 11/14 with ALDEN ground glass opacity of unclear etiology, completed empiric 7day cefepime course 11/22, rising T-bili of unclear etilogy, now w resolved candida glabrata fungemia, ELVI improving.     NEURO: Hx of depression: cont Effexor (halved dose in setting of renal dysfunx). Anxiety: Lorazepam PRN. Holistic consult for anxiety and insomnia. Gabapentin for restless leg syndrome.   CV: borderline sinus tachycardia (HR 100s), BLE duplex yesterday (1/5) no DVT,  cont Metoprolol 10q4hr for heart rate control. getting 25% albumin q8h x 3 days for hypoalbuminemia (1/5-1/8). Hx Afib/flutter: s/p previous DCCV, off Amio and lipitor due to persistent transaminitis. TTE  (7/18) - PASP 64mmHg, EF 65-70%. holding Losartan & norvasc because not absorbing PO meds, hydralazine PRN. TTE (1/4) LVEF 75%, mild/mod AR, PASP 40, RVEF wnl. POCUS chronic bilateral effusions.   PULM: Atelectasis/dyspnea improved clinically: cont 1 hr of BiPAP every 12hrs of nasal canula or room air. Encourage OOB and IS.  CT from 11/14 with ALDEN ground glass opacity of unclear etiology. COVID negative. RVP negative. completed 7d empiric cefepime 11/22 to cover for potential PNA.   GI/FEN: Low res, low fat, high protein diet. Cont Ensure max x1/day. Folate, thiamine. PICC replaced 12/4, will switch out PICC today on 1/4. Continue TPN/lipids. High output EC fistula: cont Octreotide & Cholestyramine 10/18. Transaminitis elevated T bili of unclear origin, s/p Perc Deb on 9/11, failed capping trial. Repeat capping trial 1/2, no elevation in Tbili to date; seen by Hepatology - MRCP 10/30 no obstruction, cont ursodiol, RUQ US 11/25 CBD 5mm, hepatic vessels patent  : Voids. ELVI improved: stopped famotidine given renal dysfunction. MARLO Duplex and RP US unremarkable, CK wnl.    ENDO: Hx hypothyroid: IV Synthroid, TSH low on 11/30, decreased synthroid dose, repeat TSH 12/6 wnl. Repeat thryoid studies WNL 1/3/24.   ID: currently not on any abx/antifungals. BCx 11/22 grew candida glabrata, likely CLABSI. s/p Caspo & completed Fluconazole course (12/1-12/9). Ophthalmology evaluated - normal ocular exam. Echo no vegetation. PICC replaced on 12/4. Cont Nystatin powder. // DC: caspofungin (11/24-12/1). empiric 7days cefepime (11/15-11/22), C. tertium, Lactobacillis from IR cx 7/3, and candida albicans, lactobacillus, vanc sensitive E faecium, vanc resistant E gallinarum, vanc resistant E casseliflavus, lactobacillus from OR cx 7/5. Completed course of abx with Imipenem (9/10-9/15). Imipenem (8/26--) imipenem (6/30-7/12, 7/23-7/30), Dapto (6/30-7/5 and 7/23-7/24). Empiric dapto (8/23-24) and cefepime (8/23-24).   PPX: SCDs, SQH ppx was on Therapeutic lovenox bid for R brachial vein DVT, but will hold off on anticoagulation since repeat US Duplex negative.  HEME: Anemia - continue Epogen weekly. S/p Iron Sucrose 200 qd x 3 days for chronic anemia.   LINES: PIVs, New RUE PICC placed (1/5--) will plan to switch PICC once a month secondary to history of fungemia, OLD LUE PICC removed (12/4 - 1/5) // DC: LUE PICC (9/1-11/1 ), RUE PICC: (11/1-11/24)    WOUNDS/DRAINS: Abdominal wound and fistula with wound manager in place dressing change daily. had recurrent punctate bleeding at wound bed, s/p placed surgicel, attempted to stitch, electrocaudery and epi soaked gauze. RLQ Ostomy. IR perc deb tube capped 1/2. . // DC: L Clay drain.  PT: Ambulate as tolerated OOB to chair daily.  DISPO: SICU due to complicated hospital course.

## 2024-01-07 NOTE — PROGRESS NOTE ADULT - SUBJECTIVE AND OBJECTIVE BOX
S: No new issues/events overnight, no new med c/o    O: ICU Vital Signs Last 24 Hrs  T(F): 97.5 (01-07-24 @ 05:07), Max: 97.5 (01-07-24 @ 05:07)  HR: 85 (01-07-24 @ 09:05) (85 - 106)  BP: 147/74 (01-07-24 @ 06:10) (146/80 - 182/87)  BP(mean): 102 (01-07-24 @ 06:10) (102 - 125)  ABP: --  RR: 23 (01-07-24 @ 09:05) (20 - 29)  SpO2: 100% (01-07-24 @ 09:05) (94% - 100%)    PHYSICAL EXAM:   Neurological: AAOx3, CNII-XII intact,  strength 5/5 b/l  ENT: mucus membrane moist, icterus  Cardiovascular: RRR  Respiratory: CTA  Gastrointestinal: soft, NT, ND, BS+, fistula with grayish greenish output with smear of blood.   Extremities: warm, no dependent edema  Vascular: no cyanosis/erythema  Skin: no rashes, Jaundice.   MSK: no joint swelling.       LABS:    01-07    137  |  104  |  37<H>  ----------------------------<  132<H>  4.4   |  24  |  1.33<H>    Ca    8.4      07 Jan 2024 05:40  Phos  3.7     01-07  Mg     2.0     01-07    TPro  8.5<H>  /  Alb  2.5<L>  /  TBili  6.4<H>  /  DBili  4.1<H>  /  AST  87<H>  /  ALT  57<H>  /  AlkPhos  110  01-07  LIVER FUNCTIONS - ( 07 Jan 2024 05:40 )  Alb: 2.5 g/dL / Pro: 8.5 g/dL / ALK PHOS: 110 U/L / ALT: 57 U/L / AST: 87 U/L / GGT: x                               8.6    8.16  )-----------( 184      ( 07 Jan 2024 05:40 )             27.2     Urinalysis Basic - ( 07 Jan 2024 05:40 )    Color: x / Appearance: x / SG: x / pH: x  Gluc: 132 mg/dL / Ketone: x  / Bili: x / Urobili: x   Blood: x / Protein: x / Nitrite: x   Leuk Esterase: x / RBC: x / WBC x   Sq Epi: x / Non Sq Epi: x / Bacteria: x    CAPILLARY BLOOD GLUCOSE        MEDICATIONS  (STANDING):  albumin human 25% IVPB 50 milliLiter(s) IV Intermittent every 8 hours  chlorhexidine 2% Cloths 1 Application(s) Topical <User Schedule>  cholestyramine Powder (Sugar-Free) 4 Gram(s) Oral daily  EPINEPHrine   1 mG/mL (1:1,000) Topical Solution 1 Application(s) Topical once  EPINEPHrine   1 mG/mL (1:1,000) Topical Solution 1 Application(s) Topical daily  epoetin matt-epbx (RETACRIT) Injectable 26900 Unit(s) SubCutaneous every 7 days  gabapentin 100 milliGRAM(s) Oral at bedtime  glucagon  Injectable 1 milliGRAM(s) IntraMuscular once  heparin   Injectable 5000 Unit(s) SubCutaneous every 8 hours  levothyroxine Injectable 30 MICROGram(s) IV Push at bedtime  lipid, fat emulsion (Fish Oil and Plant Based) 20% Infusion 0.54 Gm/kG/Day (20.83 mL/Hr) IV Continuous <Continuous>  lipid, fat emulsion (Fish Oil and Plant Based) 20% Infusion 0.54 Gm/kG/Day (20.83 mL/Hr) IV Continuous <Continuous>  metoprolol tartrate Injectable 10 milliGRAM(s) IV Push every 4 hours  Parenteral Nutrition - Adult 1 Each TPN Continuous <Continuous>  Parenteral Nutrition - Adult 1 Each TPN Continuous <Continuous>  ursodiol Suspension 300 milliGRAM(s) Oral every 8 hours  venlafaxine XR. 150 milliGRAM(s) Oral daily    MEDICATIONS  (PRN):  chlorhexidine 0.12% Liquid 15 milliLiter(s) Swish and Spit two times a day PRN oral hygeine  hydrALAZINE Injectable 10 milliGRAM(s) IV Push every 6 hours PRN SBP >170  LORazepam   Injectable 1 milliGRAM(s) IV Push <User Schedule> PRN Anxiety  ondansetron Injectable 4 milliGRAM(s) IV Push every 6 hours PRN Nausea and/or Vomiting      Poole:	  [x ] None	[ ] Daily Poole Order Placed	   Indication:	  [ ] Strict I and O's    [ ] Obstruction     [ ] Incontinence + Stage 3 or 4 Decubitus  Central Line:  [ ] None	   [ x]  Medication / TPN Administration     [ ] No Peripheral IV        S: No new issues/events overnight, no new med c/o    O: ICU Vital Signs Last 24 Hrs  T(F): 97.5 (01-07-24 @ 05:07), Max: 97.5 (01-07-24 @ 05:07)  HR: 85 (01-07-24 @ 09:05) (85 - 106)  BP: 147/74 (01-07-24 @ 06:10) (146/80 - 182/87)  BP(mean): 102 (01-07-24 @ 06:10) (102 - 125)  ABP: --  RR: 23 (01-07-24 @ 09:05) (20 - 29)  SpO2: 100% (01-07-24 @ 09:05) (94% - 100%)    PHYSICAL EXAM:   Neurological: AAOx3, CNII-XII intact,  strength 5/5 b/l  ENT: mucus membrane moist, icterus  Cardiovascular: RRR  Respiratory: CTA  Gastrointestinal: soft, NT, ND, BS+, fistula with grayish greenish output with smear of blood.   Extremities: warm, no dependent edema  Vascular: no cyanosis/erythema  Skin: no rashes, Jaundice.   MSK: no joint swelling.       LABS:    01-07    137  |  104  |  37<H>  ----------------------------<  132<H>  4.4   |  24  |  1.33<H>    Ca    8.4      07 Jan 2024 05:40  Phos  3.7     01-07  Mg     2.0     01-07    TPro  8.5<H>  /  Alb  2.5<L>  /  TBili  6.4<H>  /  DBili  4.1<H>  /  AST  87<H>  /  ALT  57<H>  /  AlkPhos  110  01-07  LIVER FUNCTIONS - ( 07 Jan 2024 05:40 )  Alb: 2.5 g/dL / Pro: 8.5 g/dL / ALK PHOS: 110 U/L / ALT: 57 U/L / AST: 87 U/L / GGT: x                               8.6    8.16  )-----------( 184      ( 07 Jan 2024 05:40 )             27.2     Urinalysis Basic - ( 07 Jan 2024 05:40 )    Color: x / Appearance: x / SG: x / pH: x  Gluc: 132 mg/dL / Ketone: x  / Bili: x / Urobili: x   Blood: x / Protein: x / Nitrite: x   Leuk Esterase: x / RBC: x / WBC x   Sq Epi: x / Non Sq Epi: x / Bacteria: x    CAPILLARY BLOOD GLUCOSE        MEDICATIONS  (STANDING):  albumin human 25% IVPB 50 milliLiter(s) IV Intermittent every 8 hours  chlorhexidine 2% Cloths 1 Application(s) Topical <User Schedule>  cholestyramine Powder (Sugar-Free) 4 Gram(s) Oral daily  EPINEPHrine   1 mG/mL (1:1,000) Topical Solution 1 Application(s) Topical once  EPINEPHrine   1 mG/mL (1:1,000) Topical Solution 1 Application(s) Topical daily  epoetin matt-epbx (RETACRIT) Injectable 34137 Unit(s) SubCutaneous every 7 days  gabapentin 100 milliGRAM(s) Oral at bedtime  glucagon  Injectable 1 milliGRAM(s) IntraMuscular once  heparin   Injectable 5000 Unit(s) SubCutaneous every 8 hours  levothyroxine Injectable 30 MICROGram(s) IV Push at bedtime  lipid, fat emulsion (Fish Oil and Plant Based) 20% Infusion 0.54 Gm/kG/Day (20.83 mL/Hr) IV Continuous <Continuous>  lipid, fat emulsion (Fish Oil and Plant Based) 20% Infusion 0.54 Gm/kG/Day (20.83 mL/Hr) IV Continuous <Continuous>  metoprolol tartrate Injectable 10 milliGRAM(s) IV Push every 4 hours  Parenteral Nutrition - Adult 1 Each TPN Continuous <Continuous>  Parenteral Nutrition - Adult 1 Each TPN Continuous <Continuous>  ursodiol Suspension 300 milliGRAM(s) Oral every 8 hours  venlafaxine XR. 150 milliGRAM(s) Oral daily    MEDICATIONS  (PRN):  chlorhexidine 0.12% Liquid 15 milliLiter(s) Swish and Spit two times a day PRN oral hygeine  hydrALAZINE Injectable 10 milliGRAM(s) IV Push every 6 hours PRN SBP >170  LORazepam   Injectable 1 milliGRAM(s) IV Push <User Schedule> PRN Anxiety  ondansetron Injectable 4 milliGRAM(s) IV Push every 6 hours PRN Nausea and/or Vomiting      Poole:	  [x ] None	[ ] Daily Poole Order Placed	   Indication:	  [ ] Strict I and O's    [ ] Obstruction     [ ] Incontinence + Stage 3 or 4 Decubitus  Central Line:  [ ] None	   [ x]  Medication / TPN Administration     [ ] No Peripheral IV

## 2024-01-08 NOTE — PROGRESS NOTE ADULT - SUBJECTIVE AND OBJECTIVE BOX
S: No new issues/events overnight, no new med c/o    O: ICU Vital Signs Last 24 Hrs  T(F): 98.7 (24 @ 06:21), Max: 98.7 (24 @ 06:21)  HR: 106 (24 @ 06:53) (85 - 108)  BP: 146/68 (24 @ 06:53) (145/68 - 170/81)  BP(mean): 98 (24 @ 06:53) (98 - 117)  ABP: --  RR: 20 (24 @ 06:53) (18 - 31)  SpO2: 95% (24 @ 06:53) (95% - 100%)    PHYSICAL EXAM:   Neurological: AAOx3, CNII-XII intact,  strength 5/5 b/l  ENT: mucus membrane moist, icterus  Cardiovascular: RRR  Respiratory: CTA  Gastrointestinal: soft, NT, ND, BS+, fistula with grayish light brownish output, ostomy on right side also light brownish output   Extremities: warm, no dependent edema  Vascular: no cyanosis/erythema  Skin: no rashes, Jaundice.   MSK: no joint swelling.     LABS:        137  |  104  |  37<H>  ----------------------------<  132<H>  4.4   |  24  |  1.33<H>    Ca    8.4      2024 05:40  Phos  3.7       Mg     2.0         TPro  8.5<H>  /  Alb  2.5<L>  /  TBili  6.4<H>  /  DBili  4.1<H>  /  AST  87<H>  /  ALT  57<H>  /  AlkPhos  110    LIVER FUNCTIONS - ( 2024 05:40 )  Alb: 2.5 g/dL / Pro: 8.5 g/dL / ALK PHOS: 110 U/L / ALT: 57 U/L / AST: 87 U/L / GGT: x                               8.6    8.16  )-----------( 184      ( 2024 05:40 )             27.2     Urinalysis Basic - ( 2024 19:53 )    Color: Dark Yellow / Appearance: Clear / S.018 / pH: x  Gluc: x / Ketone: Negative mg/dL  / Bili: Moderate / Urobili: 1.0 mg/dL   Blood: x / Protein: 30 mg/dL / Nitrite: Negative   Leuk Esterase: Small / RBC: 4 /HPF / WBC 2 /HPF   Sq Epi: x / Non Sq Epi: 0 /HPF / Bacteria: Negative /HPF    CAPILLARY BLOOD GLUCOSE        MEDICATIONS  (STANDING):  chlorhexidine 2% Cloths 1 Application(s) Topical <User Schedule>  cholestyramine Powder (Sugar-Free) 4 Gram(s) Oral daily  EPINEPHrine   1 mG/mL (1:1,000) Topical Solution 1 Application(s) Topical once  EPINEPHrine   1 mG/mL (1:1,000) Topical Solution 1 Application(s) Topical daily  epoetin matt-epbx (RETACRIT) Injectable 08855 Unit(s) SubCutaneous every 7 days  gabapentin 100 milliGRAM(s) Oral at bedtime  glucagon  Injectable 1 milliGRAM(s) IntraMuscular once  heparin   Injectable 5000 Unit(s) SubCutaneous every 8 hours  levothyroxine Injectable 30 MICROGram(s) IV Push at bedtime  lipid, fat emulsion (Fish Oil and Plant Based) 20% Infusion 0.54 Gm/kG/Day (21 mL/Hr) IV Continuous <Continuous>  lipid, fat emulsion (Fish Oil and Plant Based) 20% Infusion 0.54 Gm/kG/Day (20.83 mL/Hr) IV Continuous <Continuous>  metoprolol tartrate Injectable 10 milliGRAM(s) IV Push every 4 hours  Parenteral Nutrition - Adult 1 Each TPN Continuous <Continuous>  Parenteral Nutrition - Adult 1 Each TPN Continuous <Continuous>  ursodiol Suspension 300 milliGRAM(s) Oral every 8 hours  venlafaxine XR. 150 milliGRAM(s) Oral daily    MEDICATIONS  (PRN):  chlorhexidine 0.12% Liquid 15 milliLiter(s) Swish and Spit two times a day PRN oral hygeine  hydrALAZINE Injectable 10 milliGRAM(s) IV Push every 6 hours PRN SBP >170  LORazepam   Injectable 1 milliGRAM(s) IV Push <User Schedule> PRN Anxiety  ondansetron Injectable 4 milliGRAM(s) IV Push every 6 hours PRN Nausea and/or Vomiting      Poole:	  [ x] None	[ ] Daily Poole Order Placed	   Indication:	  [ ] Strict I and O's    [ ] Obstruction     [ ] Incontinence + Stage 3 or 4 Decubitus  Central Line:  [ ] None	   [x ]  Medication / TPN Administration     [ ] No Peripheral IV        S: No new issues/events overnight, no new med c/o    O: ICU Vital Signs Last 24 Hrs  T(F): 98.7 (24 @ 06:21), Max: 98.7 (24 @ 06:21)  HR: 106 (24 @ 06:53) (85 - 108)  BP: 146/68 (24 @ 06:53) (145/68 - 170/81)  BP(mean): 98 (24 @ 06:53) (98 - 117)  ABP: --  RR: 20 (24 @ 06:53) (18 - 31)  SpO2: 95% (24 @ 06:53) (95% - 100%)    PHYSICAL EXAM:   Neurological: AAOx3, CNII-XII intact,  strength 5/5 b/l  ENT: mucus membrane moist, icterus  Cardiovascular: RRR  Respiratory: CTA  Gastrointestinal: soft, NT, ND, BS+, fistula with grayish light brownish output, ostomy on right side also light brownish output   Extremities: warm, no dependent edema  Vascular: no cyanosis/erythema  Skin: no rashes, Jaundice.   MSK: no joint swelling.     LABS:        137  |  104  |  37<H>  ----------------------------<  132<H>  4.4   |  24  |  1.33<H>    Ca    8.4      2024 05:40  Phos  3.7       Mg     2.0         TPro  8.5<H>  /  Alb  2.5<L>  /  TBili  6.4<H>  /  DBili  4.1<H>  /  AST  87<H>  /  ALT  57<H>  /  AlkPhos  110    LIVER FUNCTIONS - ( 2024 05:40 )  Alb: 2.5 g/dL / Pro: 8.5 g/dL / ALK PHOS: 110 U/L / ALT: 57 U/L / AST: 87 U/L / GGT: x                               8.6    8.16  )-----------( 184      ( 2024 05:40 )             27.2     Urinalysis Basic - ( 2024 19:53 )    Color: Dark Yellow / Appearance: Clear / S.018 / pH: x  Gluc: x / Ketone: Negative mg/dL  / Bili: Moderate / Urobili: 1.0 mg/dL   Blood: x / Protein: 30 mg/dL / Nitrite: Negative   Leuk Esterase: Small / RBC: 4 /HPF / WBC 2 /HPF   Sq Epi: x / Non Sq Epi: 0 /HPF / Bacteria: Negative /HPF    CAPILLARY BLOOD GLUCOSE        MEDICATIONS  (STANDING):  chlorhexidine 2% Cloths 1 Application(s) Topical <User Schedule>  cholestyramine Powder (Sugar-Free) 4 Gram(s) Oral daily  EPINEPHrine   1 mG/mL (1:1,000) Topical Solution 1 Application(s) Topical once  EPINEPHrine   1 mG/mL (1:1,000) Topical Solution 1 Application(s) Topical daily  epoetin matt-epbx (RETACRIT) Injectable 11665 Unit(s) SubCutaneous every 7 days  gabapentin 100 milliGRAM(s) Oral at bedtime  glucagon  Injectable 1 milliGRAM(s) IntraMuscular once  heparin   Injectable 5000 Unit(s) SubCutaneous every 8 hours  levothyroxine Injectable 30 MICROGram(s) IV Push at bedtime  lipid, fat emulsion (Fish Oil and Plant Based) 20% Infusion 0.54 Gm/kG/Day (21 mL/Hr) IV Continuous <Continuous>  lipid, fat emulsion (Fish Oil and Plant Based) 20% Infusion 0.54 Gm/kG/Day (20.83 mL/Hr) IV Continuous <Continuous>  metoprolol tartrate Injectable 10 milliGRAM(s) IV Push every 4 hours  Parenteral Nutrition - Adult 1 Each TPN Continuous <Continuous>  Parenteral Nutrition - Adult 1 Each TPN Continuous <Continuous>  ursodiol Suspension 300 milliGRAM(s) Oral every 8 hours  venlafaxine XR. 150 milliGRAM(s) Oral daily    MEDICATIONS  (PRN):  chlorhexidine 0.12% Liquid 15 milliLiter(s) Swish and Spit two times a day PRN oral hygeine  hydrALAZINE Injectable 10 milliGRAM(s) IV Push every 6 hours PRN SBP >170  LORazepam   Injectable 1 milliGRAM(s) IV Push <User Schedule> PRN Anxiety  ondansetron Injectable 4 milliGRAM(s) IV Push every 6 hours PRN Nausea and/or Vomiting      Poole:	  [ x] None	[ ] Daily Poole Order Placed	   Indication:	  [ ] Strict I and O's    [ ] Obstruction     [ ] Incontinence + Stage 3 or 4 Decubitus  Central Line:  [ ] None	   [x ]  Medication / TPN Administration     [ ] No Peripheral IV

## 2024-01-08 NOTE — ADVANCED PRACTICE NURSE CONSULT - ASSESSMENT
Ileostomy draining small amount of yellow effluent. WOCN cleansed surrounding skin with cleansing wipe, applied skin prep, then an ostomy ring and a [place] 2 piece 1 3/4 inch, flat, cut to fit, lock and roll ostomy pouching system.    Stomatized fistula functioning with brown, liquid effluent. Perifistula skin friable and denuded. Small area of bleeding to perifistula skin controlled by Surgicell. WOCN cleansed denuded skin with normal saline, applied stoma powder, applied ostomy ring around the fistula, then applied an [id number] wound manager. Cut bottom of wound manager and applied a clip.  Ileostomy draining small amount of yellow effluent. WOCN cleansed surrounding skin with cleansing wipe, applied skin prep, then an ostomy ring and a [place] 2 piece 1 3/4 inch, flat, cut to fit, lock and roll ostomy pouching system.    Stomatized fistula functioning with brown, liquid effluent. Perifistula skin friable and denuded. Small area of bleeding to perifistula skin controlled by Surgicell. WOCN cleansed denuded skin with normal saline, applied stoma powder, applied ostomy ring around the fistula, then applied an [id number] [id number] wound manager. Cut bottom of wound manager and applied a clip.

## 2024-01-08 NOTE — PROGRESS NOTE ADULT - ASSESSMENT
78M w PMH Crohn's, AFib/Flutter s/p DCCVs on amiodarone, remote ileocectomy and open appendectomy. Admitted (6/23) for SBO vs Crohns flare, s/p NGT decompression and s/p lap converted to open TRE, SBR x 3, left in discontinuity with abthera vac on (6/27), RTOR for ileocolic resection, small bowel anastomosis, and abdominal wall closure on (6/28), c/b fluid collection s/p IR aspiration of perihepatic fluid on (7/3), c/b wound dehiscence s/p RTOR exlap, washout, ileocolic resection with end ileostomy, blow hole colostomy, red rubber from ileostomy to small bowel anastomosis; vicryl bridging mesh on (7/5) transferred to SICU postoperatively for hemodynamic monitoring, with hospital course complicated by periods of severe ELVI and hyponatremia, which resolved but stepped back up for to SICU on (9/10) for acute AMS, intermittent hypoglycemia, AFib with RVR. Percutaneous Cholecystostomy placed on (9/11) for enlarged GB, PCT capped 10/5 and uncapped 10/25 for hyperbilirubinemia, Right brachial DVT, left basillic and cephalic superficial thrombus on duplex 11/2, CT 11/14 with ALDEN ground glass opacity of unclear etiology, completed empiric 7day cefepime course 11/22, rising T-bili of unclear etilogy, now w resolved candida glabrata fungemia, ELVI improving.     NEURO: Hx of depression: cont Effexor (halved dose in setting of renal dysfunx). Anxiety: Lorazepam PRN. Holistic consult for anxiety and insomnia. Gabapentin for restless leg syndrome.   CV: borderline sinus tachycardia (HR 100s), BLE duplex (1/5) no DVT,  cont Metoprolol 10q4hr for heart rate control. got 25% albumin q8h x 3 days for hypoalbuminemia (1/5-1/8). Hx Afib/flutter: s/p previous DCCV, off Amio and lipitor due to persistent transaminitis. TTE  (7/18) - PASP 64mmHg, EF 65-70%. holding Losartan & norvasc because not absorbing PO meds, hydralazine PRN. TTE (1/4) LVEF 75%, mild/mod AR, PASP 40, RVEF wnl. POCUS chronic bilateral effusions.   PULM: Atelectasis/dyspnea improved clinically: cont 1 hr of BiPAP every 12hrs of nasal canula or room air. Encourage OOB and IS.  CT from 11/14 with ALDEN ground glass opacity of unclear etiology. COVID negative. RVP negative. completed 7d empiric cefepime 11/22 to cover for potential PNA.   GI/FEN: Low res, low fat, high protein diet. Cont Ensure max x1/day. Folate, thiamine. PICC replaced 12/4, switched out PICC on 1/4. Continue TPN/lipids. High output EC fistula: cont Octreotide & Cholestyramine 10/18. Transaminitis elevated T bili of unclear origin, s/p Perc Deb on 9/11, failed capping trial. Repeat capping trial 1/2, no elevation in Tbili to date; seen by Hepatology - MRCP 10/30 no obstruction, cont ursodiol, RUQ US 11/25 CBD 5mm, hepatic vessels patent  : Voids. ELVI improved: stopped famotidine given renal dysfunction. MARLO Duplex and RP US unremarkable, CK wnl.    ENDO: Hx hypothyroid: IV Synthroid, TSH low on 11/30, decreased synthroid dose, repeat TSH 12/6 wnl. Repeat thryoid studies WNL 1/3/24.   ID: currently not on any abx/antifungals. BCx 11/22 grew candida glabrata, likely CLABSI. s/p Caspo & completed Fluconazole course (12/1-12/9). Ophthalmology evaluated - normal ocular exam. Echo no vegetation. PICC replaced on 12/4. Cont Nystatin powder. // DC: caspofungin (11/24-12/1). empiric 7days cefepime (11/15-11/22), C. tertium, Lactobacillis from IR cx 7/3, and candida albicans, lactobacillus, vanc sensitive E faecium, vanc resistant E gallinarum, vanc resistant E casseliflavus, lactobacillus from OR cx 7/5. Completed course of abx with Imipenem (9/10-9/15). Imipenem (8/26--) imipenem (6/30-7/12, 7/23-7/30), Dapto (6/30-7/5 and 7/23-7/24). Empiric dapto (8/23-24) and cefepime (8/23-24).   PPX: SCDs, SQH ppx was on Therapeutic lovenox bid for R brachial vein DVT, but will hold off on anticoagulation since repeat US Duplex negative.  HEME: Anemia - continue Epogen weekly. S/p Iron Sucrose 200 qd x 3 days for chronic anemia.   LINES: PIVs, New RUE PICC placed (1/5--) will plan to switch PICC once a month secondary to history of fungemia, OLD LUE PICC removed (12/4 - 1/5) // DC: LUE PICC (9/1-11/1 ), RUE PICC: (11/1-11/24)    WOUNDS/DRAINS: Abdominal wound and fistula with wound manager in place dressing change daily. had recurrent punctate bleeding at wound bed, s/p placed surgicel, attempted to stitch, electrocaudery and epi soaked gauze. RLQ Ostomy. IR perc deb tube capped 1/2. . // DC: L Clay drain.  PT: Ambulate as tolerated OOB to chair daily.  DISPO: SICU due to complicated hospital course.

## 2024-01-08 NOTE — CHART NOTE - NSCHARTNOTEFT_GEN_A_CORE
Admitting Diagnosis:   Patient is a 78y old  Male who presents with a chief complaint of SBO (2024 07:20)      PAST MEDICAL & SURGICAL HISTORY:  Essential hypertension  Hypertension      Atrial fibrillation  s/p cardioversion  and   Pt. reports 4 DCCV  Now on Amiodarone 200mg PO bid and Eliquis 5mg PO bid  Last DCCV 4 yrs ago at [facility]      Crohn's disease  s/p partial resection of ileum      Hyperlipidemia      Hypothyroidism      History of depression  On Venlafaxine ER 150mg PO bid      Junctional rhythm      H/O knee surgery      History of cataract surgery          Current Nutrition Order: TPN via PICC- 1.9L bag running over 14hrs, providing 380g Dex, 120g AA, 50g SMOF lipids daily; provides 2272 kcals, 120g protein daily, GIR 4.81 mg/kg/min   PO- Regular diet + 2 LPS per day [provide 100 kcals, 15g protein each], + Ensure Max daily [150 kcals, 20g protein]    PO Intake: Good (%) [ xx  ]  Fair (50-75%) [   ] Poor (<25%) [   ]- per pt    GI Issues:   24: noted brownish output from [id number], total amount not documented; abdominal wound/fistula output 2285cc x 24hrs, per cony output 0cc x 24hrs  1/3/24:  ileostomy output 0cc x 24hrs, abdominal wound/fistula output 2550cc x 24hrs, per cony output 275cc x 24hrs  : ileostomy output 50cc x 24hrs, abdominal wound/fistula output 910cc x 24hrs, per cony output 1225cc x 24hrs  : ileostomy output 50cc x 24hrs, abdominal wound/fistula output 1175cc x 24hrs, per cony output 1050cc x 24hrs  : ileostomy output 50cc x 24hrs, abdominal wound/fistula output 1100cc x 24hrs, per cony output 1050cc x 24hrs     : ileostomy output 20cc x 24hrs, abdominal wound/fistula output 1275cc x 24hrs, per cony output 775cc x 24hrs    : ileostomy output 15cc x 24hrs, abdominal wound/fistula output  1230cc x 24hrs, per cony output 1010cc x 24hrs    : ileostomy output 75cc x 24hrs, abdominal wound/fistula output  600cc x 24hrs, per cony output 1125cc x 24hrs    : ileostomy output 35cc x 24hrs, abdominal wound/fistula output  250cc x 24hrs, per cony output 745cc x 24hrs    : ileostomy output 30cc x 24hrs, abdominal wound/fistula output  2400cc x 24hrs, per cony output 550cc x 24hrs   : ileostomy output 50 cc x 24hrs, abdominal wound/fistula output 2175 cc x 24hrs, per cony output 530 cc x 24hrs   : ileostomy output 25cc x 24hrs, abdominal wound/fistula output 2350 cc x 24hrs, per cony output 775cc x 24hrs   : ileostomy output 55cc x 24hrs, abdominal wound/fistula output 2025 cc x 24hrs, per cony output 450cc x 24hrs   11/15: ileostomy output 65cc x 24hrs, abdominal wound/fistula output 875cc x 24hrs, per cony output 775cc x 24hrs    Pain: no pain/discomfort noted    Skin Integrity: lower back wound/healed, jaundice, abd wound and fistula with wound manager in place, RLQ ileostomy, perc cony drain. Rudy score 20    Labs:       137  |  104  |  37<H>  ----------------------------<  132<H>  4.4   |  24  |  1.33<H>    Ca    8.4      2024 05:40  Phos  3.7       Mg     2.0         TPro  8.5<H>  /  Alb  2.5<L>  /  TBili  6.4<H>  /  DBili  4.1<H>  /  AST  87<H>  /  ALT  57<H>  /  AlkPhos  110      CAPILLARY BLOOD GLUCOSE          Medications:  MEDICATIONS  (STANDING):  chlorhexidine 2% Cloths 1 Application(s) Topical <User Schedule>  cholestyramine Powder (Sugar-Free) 4 Gram(s) Oral daily  EPINEPHrine   1 mG/mL (1:1,000) Topical Solution 1 Application(s) Topical once  EPINEPHrine   1 mG/mL (1:1,000) Topical Solution 1 Application(s) Topical daily  epoetin matt-epbx (RETACRIT) Injectable 52586 Unit(s) SubCutaneous every 7 days  gabapentin 100 milliGRAM(s) Oral at bedtime  glucagon  Injectable 1 milliGRAM(s) IntraMuscular once  heparin   Injectable 5000 Unit(s) SubCutaneous every 8 hours  levothyroxine Injectable 30 MICROGram(s) IV Push at bedtime  lipid, fat emulsion (Fish Oil and Plant Based) 20% Infusion 0.54 Gm/kG/Day (21 mL/Hr) IV Continuous <Continuous>  lipid, fat emulsion (Fish Oil and Plant Based) 20% Infusion 0.54 Gm/kG/Day (20.83 mL/Hr) IV Continuous <Continuous>  metoprolol tartrate Injectable 10 milliGRAM(s) IV Push every 4 hours  Parenteral Nutrition - Adult 1 Each TPN Continuous <Continuous>  Parenteral Nutrition - Adult 1 Each TPN Continuous <Continuous>  ursodiol Suspension 300 milliGRAM(s) Oral every 8 hours  venlafaxine XR. 150 milliGRAM(s) Oral daily    MEDICATIONS  (PRN):  chlorhexidine 0.12% Liquid 15 milliLiter(s) Swish and Spit two times a day PRN oral hygeine  hydrALAZINE Injectable 10 milliGRAM(s) IV Push every 6 hours PRN SBP >170  LORazepam   Injectable 1 milliGRAM(s) IV Push <User Schedule> PRN Anxiety  ondansetron Injectable 4 milliGRAM(s) IV Push every 6 hours PRN Nausea and/or Vomiting    Daily Weight in kkg [2024]  Daily 93.3kg [19 Dec 2023]  Daily 93.3kg [11 Dec 2023]  Daily 93.3kg [08 Dec 2023]  Daily 93.3kg [6 Dec 2023]  Daily 94.2kg [2023]  Daily 94.4kg [2023]  Daily 94.2kg [2023]  Daily 94.2kg [2023]  Daily 94.2kg [2023]  Daily 94.2 [15 Nov 2023]  Daily 94.2kg [2023]  Daily 95.8kg [2023]  Daily 94.2kg [2023]  Daily 85.2kg [2023]  Daily 85.2kg [31 Oct 2023]  Daily 85.2kg [24 Oct 2023]  Daily 85.2kg [20 Oct 2023]  Daily 81.9kg [18 Oct 2023]  Daily 85.2kg [16 Oct 2023]  Daily   95.2kg [12 Oct 2023]   Daily 87.7kg [11 Oct 2023]  Daily 87.4kg [09 Oct 2023]  Daily 86.4kg [07 Oct 2023]  Daily 82.5kg [06 Oct 2023]  Daily 82.6kg [4 Oct 2023]   Daily 83.5kg [03 Oct 2023]  Daily 84.5kg [2 Oct 2023]  Daily 87.2kg [1 Oct 2023]  Daily 88.3kg [29 Sep 2023]  Daily 89.9kg [28 Sep 2023]  Daily 87.7kg [24 Sep 2023]  Daily 90.4kg [21 Sep 2023]  Daily 91.4kg [20 Sep 2023]  Daily 86.7kg [18 Sep 2023]  Daily 88.1kg [16 Sep 2023]  Daily 88.9kg [15 Sep 2023]   Daily 89.1 [14 Sep 2023]  Daily 92.9kg [6 Sep 2023]  Daily 91.8kg [27 Aug 2023]  Daily 101kg [9 Aug 2023]     Weight Change: weight previously trending down, then up- now appears to be stable x ~2 months. Recommend continue weekly/daily weights for trending & ensuring adequacy of nutrition provision    IBW: 86.4kg  % IBW: 108.8%    Estimated energy needs:  Ideal body weight (86.4kg) used for calculations as pt >100% IBW and BMI <30 per [facility] Standards of Care. Needs estimated for age and adjusted for current clinical status, increased needs for post-op & open abd wound healing    Calories: 9710-6114 kcals based on 30-40 kcals/kg  Protein: 129.6-172.8 g protein based on 1.5-2.0g protein/kg + additional depending on outputs  *Fluid needs per team    Subjective:   78 M w/ Crohn's, AFib/Flutter s/p DCCVs on amiodarone, remote ileocectomy and open appendectomy. Admitted () for SBO vs Crohns flare, s/p NGT decompression and s/p lap TRE converted to open TRE, SBR x 3, left in discontinuity with abthera vac on (), RTOR for ileocolic resection, small bowel anastomosis, and abdominal wall closure on (), c/b fluid collection s/p IR aspiration of perihepatic fluid on (7/3), c/b wound dehiscence s/p RTOR exlap, washout, ileocolic resection with end ileostomy, blow hole colostomy, red rubber from ileostomy to small bowel anastomosis; vicryl bridging mesh on () transferred to SICU postoperatively for hemodynamic monitoring, with hospital course complicated by periods of AMS most recently on  and associated with oliguria, severe ELVI and hyponatremia, which resolved but stepped back up for to SICU on 9/10 for acute AMS, intermittent hypoglycemia, AFib with RVR. Percutaneous Cholecystostomy placed on  for enlarged GB, PCT capped 10/5 and uncapped 10/25 for hyperbilirubinemia, Right brachial DVT, left basillic and cephalic superficial thrombus on duplex , CT  with ALDEN ground glass opacity of unclear etiology, completed empiric 7day cefepime course , rising T-bili of unclear etilogy, now w resolved candida glabrata fungemia, ELVI improving.     Chart reviewed, pt seen at bedside on 5 EA with IDT during AM rounds. Rx and Meds reviewed. 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 Continues with lipids & copper daily with close monitoring of hepatic function. Labs- BUN 37 <H>, Creat 1.33 <H> [improving], total bilirubin 6.4 <H>,       Previous Nutrition Diagnosis:  1. Increased Nutrient Needs R/T physiological demands for nutrient AEB post-op wound healing  Active [ X  ]  Resolved [   ]    2. Altered GI function R/T extensive GI history, insufficient bowel, high output fistula AEB supplemental PN requirement to meet nutrition needs  Active [ X  ]  Resolved [   ]    If resolved, new PES:     Goal:  Pt will meet at least 75% of protein & energy needs via most appropriate route for nutrition     Recommendations:    Education: ongoing diet education regarding current diet order    Risk Level: High [ X  ] Moderate [   ] Low [   ] Admitting Diagnosis:   Patient is a 78y old  Male who presents with a chief complaint of SBO (2024 07:20)      PAST MEDICAL & SURGICAL HISTORY:  Essential hypertension  Hypertension      Atrial fibrillation  s/p cardioversion  and   Pt. reports 4 DCCV  Now on Amiodarone 200mg PO bid and Eliquis 5mg PO bid  Last DCCV 4 yrs ago at [facility]      Crohn's disease  s/p partial resection of ileum      Hyperlipidemia      Hypothyroidism      History of depression  On Venlafaxine ER 150mg PO bid      Junctional rhythm      H/O knee surgery      History of cataract surgery          Current Nutrition Order: TPN via PICC- 1.9L bag running over 14hrs, providing 380g Dex, 120g AA, 50g SMOF lipids daily; provides 2272 kcals, 120g protein daily, GIR 4.81 mg/kg/min   PO- Regular diet + 2 LPS per day [provide 100 kcals, 15g protein each], + Ensure Max daily [150 kcals, 20g protein]    PO Intake: Good (%) [ xx  ]  Fair (50-75%) [   ] Poor (<25%) [   ]- per pt    GI Issues:   24: noted brownish output from [id number], total amount not documented; abdominal wound/fistula output 2285cc x 24hrs, per cony output 0cc x 24hrs  1/3/24:  ileostomy output 0cc x 24hrs, abdominal wound/fistula output 2550cc x 24hrs, per cony output 275cc x 24hrs  : ileostomy output 50cc x 24hrs, abdominal wound/fistula output 910cc x 24hrs, per cony output 1225cc x 24hrs  : ileostomy output 50cc x 24hrs, abdominal wound/fistula output 1175cc x 24hrs, per cony output 1050cc x 24hrs  : ileostomy output 50cc x 24hrs, abdominal wound/fistula output 1100cc x 24hrs, per cony output 1050cc x 24hrs     : ileostomy output 20cc x 24hrs, abdominal wound/fistula output 1275cc x 24hrs, per cony output 775cc x 24hrs    : ileostomy output 15cc x 24hrs, abdominal wound/fistula output  1230cc x 24hrs, per cony output 1010cc x 24hrs    : ileostomy output 75cc x 24hrs, abdominal wound/fistula output  600cc x 24hrs, per cony output 1125cc x 24hrs    : ileostomy output 35cc x 24hrs, abdominal wound/fistula output  250cc x 24hrs, per cony output 745cc x 24hrs    : ileostomy output 30cc x 24hrs, abdominal wound/fistula output  2400cc x 24hrs, per cony output 550cc x 24hrs   : ileostomy output 50 cc x 24hrs, abdominal wound/fistula output 2175 cc x 24hrs, per cony output 530 cc x 24hrs   : ileostomy output 25cc x 24hrs, abdominal wound/fistula output 2350 cc x 24hrs, per cony output 775cc x 24hrs   : ileostomy output 55cc x 24hrs, abdominal wound/fistula output 2025 cc x 24hrs, per cony output 450cc x 24hrs   11/15: ileostomy output 65cc x 24hrs, abdominal wound/fistula output 875cc x 24hrs, per cony output 775cc x 24hrs    Pain: no pain/discomfort noted    Skin Integrity: lower back wound/healed, jaundice, abd wound and fistula with wound manager in place, RLQ ileostomy, perc cony drain. Rudy score 20    Labs:       137  |  104  |  37<H>  ----------------------------<  132<H>  4.4   |  24  |  1.33<H>    Ca    8.4      2024 05:40  Phos  3.7       Mg     2.0         TPro  8.5<H>  /  Alb  2.5<L>  /  TBili  6.4<H>  /  DBili  4.1<H>  /  AST  87<H>  /  ALT  57<H>  /  AlkPhos  110      CAPILLARY BLOOD GLUCOSE          Medications:  MEDICATIONS  (STANDING):  chlorhexidine 2% Cloths 1 Application(s) Topical <User Schedule>  cholestyramine Powder (Sugar-Free) 4 Gram(s) Oral daily  EPINEPHrine   1 mG/mL (1:1,000) Topical Solution 1 Application(s) Topical once  EPINEPHrine   1 mG/mL (1:1,000) Topical Solution 1 Application(s) Topical daily  epoetin matt-epbx (RETACRIT) Injectable 56101 Unit(s) SubCutaneous every 7 days  gabapentin 100 milliGRAM(s) Oral at bedtime  glucagon  Injectable 1 milliGRAM(s) IntraMuscular once  heparin   Injectable 5000 Unit(s) SubCutaneous every 8 hours  levothyroxine Injectable 30 MICROGram(s) IV Push at bedtime  lipid, fat emulsion (Fish Oil and Plant Based) 20% Infusion 0.54 Gm/kG/Day (21 mL/Hr) IV Continuous <Continuous>  lipid, fat emulsion (Fish Oil and Plant Based) 20% Infusion 0.54 Gm/kG/Day (20.83 mL/Hr) IV Continuous <Continuous>  metoprolol tartrate Injectable 10 milliGRAM(s) IV Push every 4 hours  Parenteral Nutrition - Adult 1 Each TPN Continuous <Continuous>  Parenteral Nutrition - Adult 1 Each TPN Continuous <Continuous>  ursodiol Suspension 300 milliGRAM(s) Oral every 8 hours  venlafaxine XR. 150 milliGRAM(s) Oral daily    MEDICATIONS  (PRN):  chlorhexidine 0.12% Liquid 15 milliLiter(s) Swish and Spit two times a day PRN oral hygeine  hydrALAZINE Injectable 10 milliGRAM(s) IV Push every 6 hours PRN SBP >170  LORazepam   Injectable 1 milliGRAM(s) IV Push <User Schedule> PRN Anxiety  ondansetron Injectable 4 milliGRAM(s) IV Push every 6 hours PRN Nausea and/or Vomiting    Daily Weight in kkg [2024]  Daily 93.3kg [19 Dec 2023]  Daily 93.3kg [11 Dec 2023]  Daily 93.3kg [08 Dec 2023]  Daily 93.3kg [6 Dec 2023]  Daily 94.2kg [2023]  Daily 94.4kg [2023]  Daily 94.2kg [2023]  Daily 94.2kg [2023]  Daily 94.2kg [2023]  Daily 94.2 [15 Nov 2023]  Daily 94.2kg [2023]  Daily 95.8kg [2023]  Daily 94.2kg [2023]  Daily 85.2kg [2023]  Daily 85.2kg [31 Oct 2023]  Daily 85.2kg [24 Oct 2023]  Daily 85.2kg [20 Oct 2023]  Daily 81.9kg [18 Oct 2023]  Daily 85.2kg [16 Oct 2023]  Daily   95.2kg [12 Oct 2023]   Daily 87.7kg [11 Oct 2023]  Daily 87.4kg [09 Oct 2023]  Daily 86.4kg [07 Oct 2023]  Daily 82.5kg [06 Oct 2023]  Daily 82.6kg [4 Oct 2023]   Daily 83.5kg [03 Oct 2023]  Daily 84.5kg [2 Oct 2023]  Daily 87.2kg [1 Oct 2023]  Daily 88.3kg [29 Sep 2023]  Daily 89.9kg [28 Sep 2023]  Daily 87.7kg [24 Sep 2023]  Daily 90.4kg [21 Sep 2023]  Daily 91.4kg [20 Sep 2023]  Daily 86.7kg [18 Sep 2023]  Daily 88.1kg [16 Sep 2023]  Daily 88.9kg [15 Sep 2023]   Daily 89.1 [14 Sep 2023]  Daily 92.9kg [6 Sep 2023]  Daily 91.8kg [27 Aug 2023]  Daily 101kg [9 Aug 2023]     Weight Change: weight previously trending down, then up- now appears to be stable x ~2 months. Recommend continue weekly/daily weights for trending & ensuring adequacy of nutrition provision    IBW: 86.4kg  % IBW: 108.8%    Estimated energy needs:  Ideal body weight (86.4kg) used for calculations as pt >100% IBW and BMI <30 per [facility] Standards of Care. Needs estimated for age and adjusted for current clinical status, increased needs for post-op & open abd wound healing    Calories: 9514-4665 kcals based on 30-40 kcals/kg  Protein: 129.6-172.8 g protein based on 1.5-2.0g protein/kg + additional depending on outputs  *Fluid needs per team    Subjective:   78 M w/ Crohn's, AFib/Flutter s/p DCCVs on amiodarone, remote ileocectomy and open appendectomy. Admitted () for SBO vs Crohns flare, s/p NGT decompression and s/p lap TRE converted to open TRE, SBR x 3, left in discontinuity with abthera vac on (), RTOR for ileocolic resection, small bowel anastomosis, and abdominal wall closure on (), c/b fluid collection s/p IR aspiration of perihepatic fluid on (7/3), c/b wound dehiscence s/p RTOR exlap, washout, ileocolic resection with end ileostomy, blow hole colostomy, red rubber from ileostomy to small bowel anastomosis; vicryl bridging mesh on () transferred to SICU postoperatively for hemodynamic monitoring, with hospital course complicated by periods of AMS most recently on  and associated with oliguria, severe ELVI and hyponatremia, which resolved but stepped back up for to SICU on 9/10 for acute AMS, intermittent hypoglycemia, AFib with RVR. Percutaneous Cholecystostomy placed on  for enlarged GB, PCT capped 10/5 and uncapped 10/25 for hyperbilirubinemia, Right brachial DVT, left basillic and cephalic superficial thrombus on duplex , CT  with ALDEN ground glass opacity of unclear etiology, completed empiric 7day cefepime course , rising T-bili of unclear etilogy, now w resolved candida glabrata fungemia, ELVI improving.     Chart reviewed, pt seen at bedside on 5 EA with IDT during AM rounds. Rx and Meds reviewed. 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 Continues with lipids & copper daily with close monitoring of hepatic function. Labs- BUN 37 <H>, Creat 1.33 <H> [improving], total bilirubin 6.4 <H>,       Previous Nutrition Diagnosis:  1. Increased Nutrient Needs R/T physiological demands for nutrient AEB post-op wound healing  Active [ X  ]  Resolved [   ]    2. Altered GI function R/T extensive GI history, insufficient bowel, high output fistula AEB supplemental PN requirement to meet nutrition needs  Active [ X  ]  Resolved [   ]    If resolved, new PES:     Goal:  Pt will meet at least 75% of protein & energy needs via most appropriate route for nutrition     Recommendations:    Education: ongoing diet education regarding current diet order    Risk Level: High [ X  ] Moderate [   ] Low [   ] Admitting Diagnosis:   Patient is a 78y old  Male who presents with a chief complaint of SBO (2024 07:20)      PAST MEDICAL & SURGICAL HISTORY:  Essential hypertension  Hypertension      Atrial fibrillation  s/p cardioversion  and   Pt. reports 4 DCCV  Now on Amiodarone 200mg PO bid and Eliquis 5mg PO bid  Last DCCV 4 yrs ago at [facility]      Crohn's disease  s/p partial resection of ileum      Hyperlipidemia      Hypothyroidism      History of depression  On Venlafaxine ER 150mg PO bid      Junctional rhythm      H/O knee surgery      History of cataract surgery          Current Nutrition Order: TPN via PICC- 1.9L bag running over 14hrs, providing 380g Dex, 120g AA, 50g SMOF lipids daily; provides 2272 kcals, 120g protein daily, GIR 4.81 mg/kg/min   PO- Regular diet + 2 LPS per day [provide 100 kcals, 15g protein each], + Ensure Max daily [150 kcals, 20g protein]    PO Intake: Good (%) [ xx  ]  Fair (50-75%) [   ] Poor (<25%) [   ]- per pt    GI Issues:   24: noted brownish output from [id number], total amount not documented; abdominal wound/fistula output 2285cc x 24hrs, per cony output 0cc x 24hrs  1/3/24:  ileostomy output 0cc x 24hrs, abdominal wound/fistula output 2550cc x 24hrs, per cony output 275cc x 24hrs  : ileostomy output 50cc x 24hrs, abdominal wound/fistula output 910cc x 24hrs, per cony output 1225cc x 24hrs  : ileostomy output 50cc x 24hrs, abdominal wound/fistula output 1175cc x 24hrs, per cony output 1050cc x 24hrs  : ileostomy output 50cc x 24hrs, abdominal wound/fistula output 1100cc x 24hrs, per cony output 1050cc x 24hrs     : ileostomy output 20cc x 24hrs, abdominal wound/fistula output 1275cc x 24hrs, per cony output 775cc x 24hrs    : ileostomy output 15cc x 24hrs, abdominal wound/fistula output  1230cc x 24hrs, per cony output 1010cc x 24hrs    : ileostomy output 75cc x 24hrs, abdominal wound/fistula output  600cc x 24hrs, per cony output 1125cc x 24hrs    : ileostomy output 35cc x 24hrs, abdominal wound/fistula output  250cc x 24hrs, per cony output 745cc x 24hrs    : ileostomy output 30cc x 24hrs, abdominal wound/fistula output  2400cc x 24hrs, per cony output 550cc x 24hrs   : ileostomy output 50 cc x 24hrs, abdominal wound/fistula output 2175 cc x 24hrs, per cony output 530 cc x 24hrs   : ileostomy output 25cc x 24hrs, abdominal wound/fistula output 2350 cc x 24hrs, per cony output 775cc x 24hrs   : ileostomy output 55cc x 24hrs, abdominal wound/fistula output 2025 cc x 24hrs, per cony output 450cc x 24hrs   11/15: ileostomy output 65cc x 24hrs, abdominal wound/fistula output 875cc x 24hrs, per cony output 775cc x 24hrs    Pain: no pain/discomfort noted    Skin Integrity: lower back wound/healed, jaundice, abd wound and fistula with wound manager in place, RLQ ileostomy, perc cony drain. Rudy score 20    Labs:       137  |  104  |  37<H>  ----------------------------<  132<H>  4.4   |  24  |  1.33<H>    Ca    8.4      2024 05:40  Phos  3.7       Mg     2.0         TPro  8.5<H>  /  Alb  2.5<L>  /  TBili  6.4<H>  /  DBili  4.1<H>  /  AST  87<H>  /  ALT  57<H>  /  AlkPhos  110      CAPILLARY BLOOD GLUCOSE          Medications:  MEDICATIONS  (STANDING):  chlorhexidine 2% Cloths 1 Application(s) Topical <User Schedule>  cholestyramine Powder (Sugar-Free) 4 Gram(s) Oral daily  EPINEPHrine   1 mG/mL (1:1,000) Topical Solution 1 Application(s) Topical once  EPINEPHrine   1 mG/mL (1:1,000) Topical Solution 1 Application(s) Topical daily  epoetin matt-epbx (RETACRIT) Injectable 34344 Unit(s) SubCutaneous every 7 days  gabapentin 100 milliGRAM(s) Oral at bedtime  glucagon  Injectable 1 milliGRAM(s) IntraMuscular once  heparin   Injectable 5000 Unit(s) SubCutaneous every 8 hours  levothyroxine Injectable 30 MICROGram(s) IV Push at bedtime  lipid, fat emulsion (Fish Oil and Plant Based) 20% Infusion 0.54 Gm/kG/Day (21 mL/Hr) IV Continuous <Continuous>  lipid, fat emulsion (Fish Oil and Plant Based) 20% Infusion 0.54 Gm/kG/Day (20.83 mL/Hr) IV Continuous <Continuous>  metoprolol tartrate Injectable 10 milliGRAM(s) IV Push every 4 hours  Parenteral Nutrition - Adult 1 Each TPN Continuous <Continuous>  Parenteral Nutrition - Adult 1 Each TPN Continuous <Continuous>  ursodiol Suspension 300 milliGRAM(s) Oral every 8 hours  venlafaxine XR. 150 milliGRAM(s) Oral daily    MEDICATIONS  (PRN):  chlorhexidine 0.12% Liquid 15 milliLiter(s) Swish and Spit two times a day PRN oral hygeine  hydrALAZINE Injectable 10 milliGRAM(s) IV Push every 6 hours PRN SBP >170  LORazepam   Injectable 1 milliGRAM(s) IV Push <User Schedule> PRN Anxiety  ondansetron Injectable 4 milliGRAM(s) IV Push every 6 hours PRN Nausea and/or Vomiting    Daily Weight in kkg [2024]  Daily 93.3kg [19 Dec 2023]  Daily 93.3kg [11 Dec 2023]  Daily 93.3kg [08 Dec 2023]  Daily 93.3kg [6 Dec 2023]  Daily 94.2kg [2023]  Daily 94.4kg [2023]  Daily 94.2kg [2023]  Daily 94.2kg [2023]  Daily 94.2kg [2023]  Daily 94.2 [15 Nov 2023]  Daily 94.2kg [2023]  Daily 95.8kg [2023]  Daily 94.2kg [2023]  Daily 85.2kg [2023]  Daily 85.2kg [31 Oct 2023]  Daily 85.2kg [24 Oct 2023]  Daily 85.2kg [20 Oct 2023]  Daily 81.9kg [18 Oct 2023]  Daily 85.2kg [16 Oct 2023]  Daily   95.2kg [12 Oct 2023]   Daily 87.7kg [11 Oct 2023]  Daily 87.4kg [09 Oct 2023]  Daily 86.4kg [07 Oct 2023]  Daily 82.5kg [06 Oct 2023]  Daily 82.6kg [4 Oct 2023]   Daily 83.5kg [03 Oct 2023]  Daily 84.5kg [2 Oct 2023]  Daily 87.2kg [1 Oct 2023]  Daily 88.3kg [29 Sep 2023]  Daily 89.9kg [28 Sep 2023]  Daily 87.7kg [24 Sep 2023]  Daily 90.4kg [21 Sep 2023]  Daily 91.4kg [20 Sep 2023]  Daily 86.7kg [18 Sep 2023]  Daily 88.1kg [16 Sep 2023]  Daily 88.9kg [15 Sep 2023]   Daily 89.1 [14 Sep 2023]  Daily 92.9kg [6 Sep 2023]  Daily 91.8kg [27 Aug 2023]  Daily 101kg [9 Aug 2023]     Weight Change: weight previously trending down, then up- now appears to be stable x ~2 months. Recommend continue weekly/daily weights for trending & ensuring adequacy of nutrition provision    IBW: 86.4kg  % IBW: 108.8%    Estimated energy needs:  Ideal body weight (86.4kg) used for calculations as pt >100% IBW and BMI <30 per [facility] Standards of Care. Needs estimated for age and adjusted for current clinical status, increased needs for post-op & open abd wound healing    Calories: 8179-6223 kcals based on 30-40 kcals/kg  Protein: 129.6-172.8 g protein based on 1.5-2.0g protein/kg + additional depending on outputs  *Fluid needs per team    Subjective:   78 M w/ Crohn's, AFib/Flutter s/p DCCVs on amiodarone, remote ileocectomy and open appendectomy. Admitted () for SBO vs Crohns flare, s/p NGT decompression and s/p lap TRE converted to open TRE, SBR x 3, left in discontinuity with abthera vac on (), RTOR for ileocolic resection, small bowel anastomosis, and abdominal wall closure on (), c/b fluid collection s/p IR aspiration of perihepatic fluid on (7/3), c/b wound dehiscence s/p RTOR exlap, washout, ileocolic resection with end ileostomy, blow hole colostomy, red rubber from ileostomy to small bowel anastomosis; vicryl bridging mesh on () transferred to SICU postoperatively for hemodynamic monitoring, with hospital course complicated by periods of AMS most recently on  and associated with oliguria, severe ELVI and hyponatremia, which resolved but stepped back up for to SICU on 9/10 for acute AMS, intermittent hypoglycemia, AFib with RVR. Percutaneous Cholecystostomy placed on  for enlarged GB, PCT capped 10/5 and uncapped 10/25 for hyperbilirubinemia, Right brachial DVT, left basillic and cephalic superficial thrombus on duplex , CT  with ALDEN ground glass opacity of unclear etiology, completed empiric 7day cefepime course , rising T-bili of unclear etilogy, now w resolved candida glabrata fungemia, ELVI improving.     Chart reviewed, pt seen at bedside on 5 EA with IDT during AM rounds. Rx and Meds reviewed. 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 Continues with lipids & copper daily with close monitoring of hepatic function. Vit D supplementation ongoing. Labs- BUN 37 <H>, Creat 1.33 <H> [improving], total bilirubin 6.4 <H>, direct bilirubin 4.1 <H>, AST 87 <H>, ALT 57 <H>, indirect bilirubin 2.3 <H>,  <H>. Meds- 50,000 units ergocalciferol, zofran prn, synthroid [administer 30-60 minutes before food; separate at least 4hrs from calcium or iron-containing products or bile acid sequestrants], EPO, ursodiol, cholestyramine. RDN will continue to monitor, reassess, and intervene as appropriate.     Previous Nutrition Diagnosis:  1. Increased Nutrient Needs R/T physiological demands for nutrient AEB post-op wound healing  Active [ X  ]  Resolved [   ]    2. Altered GI function R/T extensive GI history, insufficient bowel, high output fistula AEB supplemental PN requirement to meet nutrition needs  Active [ X  ]  Resolved [   ]    If resolved, new PES:     Goal:  Pt will meet at least 75% of protein & energy needs via most appropriate route for nutrition     Recommendations:  1. c/w TPN via PICC as primary means to nutrition - recommend 1.9L bag running over 14hrs (135ml/hr), providing 380g Dex, 120g AA, 50g SMOF lipids daily; provides 2272 kcals, 120g protein daily, GIR 4.81 mg/kg/min   - provides 26.3 kcals/kg, 20.7 non-protein kcals/kg, 1.4g protein/kg  - monitor weights & wound healing, adjust substrates as indicated  - fluid & lytes per team  - MVI, thiamine, vit C in bag  2. Selenium & copper in TPN daily  - monitor zinc levels and adjust prn  - recheck levels in 4 weeks  - c/w Vit D supplementation and recheck levels in 4 wks  3. Recommend weekly weights for trending/ensuring adequacy of nutrition provision  4. PO diet per team discretion  -  c/w Regular diet as medically feasible to allow for more menu options  - c/w 1 LPS BID [200 kcals, 30g protein] + 1 Ensure Max daily [150 kcals, 30g protein] as amenable  - consider NPO for bowel rest as indicated   - ongoing education prn  5. Hydration per team  - risk for dehydration with high outputs, monitor  - additional fluids per team  - consider oral rehydration solution  6. Weekly lipid panel while on TPN  - hold/decrease lipids if TG >400  - continue to trend LFTs  7. Monitor BMP/Mg/Phos, POC BG while on TPN  - monitor & replenish lytes outside TPN bag prn  8. Continue close monitoring of clinical course & adjust recommendations prn    Education: ongoing diet education regarding current diet order    Risk Level: High [ X  ] Moderate [   ] Low [   ] Admitting Diagnosis:   Patient is a 78y old  Male who presents with a chief complaint of SBO (2024 07:20)      PAST MEDICAL & SURGICAL HISTORY:  Essential hypertension  Hypertension      Atrial fibrillation  s/p cardioversion  and   Pt. reports 4 DCCV  Now on Amiodarone 200mg PO bid and Eliquis 5mg PO bid  Last DCCV 4 yrs ago at [facility]      Crohn's disease  s/p partial resection of ileum      Hyperlipidemia      Hypothyroidism      History of depression  On Venlafaxine ER 150mg PO bid      Junctional rhythm      H/O knee surgery      History of cataract surgery          Current Nutrition Order: TPN via PICC- 1.9L bag running over 14hrs, providing 380g Dex, 120g AA, 50g SMOF lipids daily; provides 2272 kcals, 120g protein daily, GIR 4.81 mg/kg/min   PO- Regular diet + 2 LPS per day [provide 100 kcals, 15g protein each], + Ensure Max daily [150 kcals, 20g protein]    PO Intake: Good (%) [ xx  ]  Fair (50-75%) [   ] Poor (<25%) [   ]- per pt    GI Issues:   24: noted brownish output from [id number], total amount not documented; abdominal wound/fistula output 2285cc x 24hrs, per cony output 0cc x 24hrs  1/3/24:  ileostomy output 0cc x 24hrs, abdominal wound/fistula output 2550cc x 24hrs, per cony output 275cc x 24hrs  : ileostomy output 50cc x 24hrs, abdominal wound/fistula output 910cc x 24hrs, per cony output 1225cc x 24hrs  : ileostomy output 50cc x 24hrs, abdominal wound/fistula output 1175cc x 24hrs, per cony output 1050cc x 24hrs  : ileostomy output 50cc x 24hrs, abdominal wound/fistula output 1100cc x 24hrs, per cony output 1050cc x 24hrs     : ileostomy output 20cc x 24hrs, abdominal wound/fistula output 1275cc x 24hrs, per cony output 775cc x 24hrs    : ileostomy output 15cc x 24hrs, abdominal wound/fistula output  1230cc x 24hrs, per cony output 1010cc x 24hrs    : ileostomy output 75cc x 24hrs, abdominal wound/fistula output  600cc x 24hrs, per cony output 1125cc x 24hrs    : ileostomy output 35cc x 24hrs, abdominal wound/fistula output  250cc x 24hrs, per cony output 745cc x 24hrs    : ileostomy output 30cc x 24hrs, abdominal wound/fistula output  2400cc x 24hrs, per cony output 550cc x 24hrs   : ileostomy output 50 cc x 24hrs, abdominal wound/fistula output 2175 cc x 24hrs, per cony output 530 cc x 24hrs   : ileostomy output 25cc x 24hrs, abdominal wound/fistula output 2350 cc x 24hrs, per cony output 775cc x 24hrs   : ileostomy output 55cc x 24hrs, abdominal wound/fistula output 2025 cc x 24hrs, per cony output 450cc x 24hrs   11/15: ileostomy output 65cc x 24hrs, abdominal wound/fistula output 875cc x 24hrs, per cony output 775cc x 24hrs    Pain: no pain/discomfort noted    Skin Integrity: lower back wound/healed, jaundice, abd wound and fistula with wound manager in place, RLQ ileostomy, perc cony drain. Rudy score 20    Labs:       137  |  104  |  37<H>  ----------------------------<  132<H>  4.4   |  24  |  1.33<H>    Ca    8.4      2024 05:40  Phos  3.7       Mg     2.0         TPro  8.5<H>  /  Alb  2.5<L>  /  TBili  6.4<H>  /  DBili  4.1<H>  /  AST  87<H>  /  ALT  57<H>  /  AlkPhos  110      CAPILLARY BLOOD GLUCOSE          Medications:  MEDICATIONS  (STANDING):  chlorhexidine 2% Cloths 1 Application(s) Topical <User Schedule>  cholestyramine Powder (Sugar-Free) 4 Gram(s) Oral daily  EPINEPHrine   1 mG/mL (1:1,000) Topical Solution 1 Application(s) Topical once  EPINEPHrine   1 mG/mL (1:1,000) Topical Solution 1 Application(s) Topical daily  epoetin matt-epbx (RETACRIT) Injectable 43790 Unit(s) SubCutaneous every 7 days  gabapentin 100 milliGRAM(s) Oral at bedtime  glucagon  Injectable 1 milliGRAM(s) IntraMuscular once  heparin   Injectable 5000 Unit(s) SubCutaneous every 8 hours  levothyroxine Injectable 30 MICROGram(s) IV Push at bedtime  lipid, fat emulsion (Fish Oil and Plant Based) 20% Infusion 0.54 Gm/kG/Day (21 mL/Hr) IV Continuous <Continuous>  lipid, fat emulsion (Fish Oil and Plant Based) 20% Infusion 0.54 Gm/kG/Day (20.83 mL/Hr) IV Continuous <Continuous>  metoprolol tartrate Injectable 10 milliGRAM(s) IV Push every 4 hours  Parenteral Nutrition - Adult 1 Each TPN Continuous <Continuous>  Parenteral Nutrition - Adult 1 Each TPN Continuous <Continuous>  ursodiol Suspension 300 milliGRAM(s) Oral every 8 hours  venlafaxine XR. 150 milliGRAM(s) Oral daily    MEDICATIONS  (PRN):  chlorhexidine 0.12% Liquid 15 milliLiter(s) Swish and Spit two times a day PRN oral hygeine  hydrALAZINE Injectable 10 milliGRAM(s) IV Push every 6 hours PRN SBP >170  LORazepam   Injectable 1 milliGRAM(s) IV Push <User Schedule> PRN Anxiety  ondansetron Injectable 4 milliGRAM(s) IV Push every 6 hours PRN Nausea and/or Vomiting    Daily Weight in kkg [2024]  Daily 93.3kg [19 Dec 2023]  Daily 93.3kg [11 Dec 2023]  Daily 93.3kg [08 Dec 2023]  Daily 93.3kg [6 Dec 2023]  Daily 94.2kg [2023]  Daily 94.4kg [2023]  Daily 94.2kg [2023]  Daily 94.2kg [2023]  Daily 94.2kg [2023]  Daily 94.2 [15 Nov 2023]  Daily 94.2kg [2023]  Daily 95.8kg [2023]  Daily 94.2kg [2023]  Daily 85.2kg [2023]  Daily 85.2kg [31 Oct 2023]  Daily 85.2kg [24 Oct 2023]  Daily 85.2kg [20 Oct 2023]  Daily 81.9kg [18 Oct 2023]  Daily 85.2kg [16 Oct 2023]  Daily   95.2kg [12 Oct 2023]   Daily 87.7kg [11 Oct 2023]  Daily 87.4kg [09 Oct 2023]  Daily 86.4kg [07 Oct 2023]  Daily 82.5kg [06 Oct 2023]  Daily 82.6kg [4 Oct 2023]   Daily 83.5kg [03 Oct 2023]  Daily 84.5kg [2 Oct 2023]  Daily 87.2kg [1 Oct 2023]  Daily 88.3kg [29 Sep 2023]  Daily 89.9kg [28 Sep 2023]  Daily 87.7kg [24 Sep 2023]  Daily 90.4kg [21 Sep 2023]  Daily 91.4kg [20 Sep 2023]  Daily 86.7kg [18 Sep 2023]  Daily 88.1kg [16 Sep 2023]  Daily 88.9kg [15 Sep 2023]   Daily 89.1 [14 Sep 2023]  Daily 92.9kg [6 Sep 2023]  Daily 91.8kg [27 Aug 2023]  Daily 101kg [9 Aug 2023]     Weight Change: weight previously trending down, then up- now appears to be stable x ~2 months. Recommend continue weekly/daily weights for trending & ensuring adequacy of nutrition provision    IBW: 86.4kg  % IBW: 108.8%    Estimated energy needs:  Ideal body weight (86.4kg) used for calculations as pt >100% IBW and BMI <30 per [facility] Standards of Care. Needs estimated for age and adjusted for current clinical status, increased needs for post-op & open abd wound healing    Calories: 0532-3340 kcals based on 30-40 kcals/kg  Protein: 129.6-172.8 g protein based on 1.5-2.0g protein/kg + additional depending on outputs  *Fluid needs per team    Subjective:   78 M w/ Crohn's, AFib/Flutter s/p DCCVs on amiodarone, remote ileocectomy and open appendectomy. Admitted () for SBO vs Crohns flare, s/p NGT decompression and s/p lap TRE converted to open TRE, SBR x 3, left in discontinuity with abthera vac on (), RTOR for ileocolic resection, small bowel anastomosis, and abdominal wall closure on (), c/b fluid collection s/p IR aspiration of perihepatic fluid on (7/3), c/b wound dehiscence s/p RTOR exlap, washout, ileocolic resection with end ileostomy, blow hole colostomy, red rubber from ileostomy to small bowel anastomosis; vicryl bridging mesh on () transferred to SICU postoperatively for hemodynamic monitoring, with hospital course complicated by periods of AMS most recently on  and associated with oliguria, severe ELVI and hyponatremia, which resolved but stepped back up for to SICU on 9/10 for acute AMS, intermittent hypoglycemia, AFib with RVR. Percutaneous Cholecystostomy placed on  for enlarged GB, PCT capped 10/5 and uncapped 10/25 for hyperbilirubinemia, Right brachial DVT, left basillic and cephalic superficial thrombus on duplex , CT  with ALDEN ground glass opacity of unclear etiology, completed empiric 7day cefepime course , rising T-bili of unclear etilogy, now w resolved candida glabrata fungemia, ELVI improving.     Chart reviewed, pt seen at bedside on 5 EA with IDT during AM rounds. Rx and Meds reviewed. 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 Continues with lipids & copper daily with close monitoring of hepatic function. Vit D supplementation ongoing. Labs- BUN 37 <H>, Creat 1.33 <H> [improving], total bilirubin 6.4 <H>, direct bilirubin 4.1 <H>, AST 87 <H>, ALT 57 <H>, indirect bilirubin 2.3 <H>,  <H>. Meds- 50,000 units ergocalciferol, zofran prn, synthroid [administer 30-60 minutes before food; separate at least 4hrs from calcium or iron-containing products or bile acid sequestrants], EPO, ursodiol, cholestyramine. RDN will continue to monitor, reassess, and intervene as appropriate.     Previous Nutrition Diagnosis:  1. Increased Nutrient Needs R/T physiological demands for nutrient AEB post-op wound healing  Active [ X  ]  Resolved [   ]    2. Altered GI function R/T extensive GI history, insufficient bowel, high output fistula AEB supplemental PN requirement to meet nutrition needs  Active [ X  ]  Resolved [   ]    If resolved, new PES:     Goal:  Pt will meet at least 75% of protein & energy needs via most appropriate route for nutrition     Recommendations:  1. c/w TPN via PICC as primary means to nutrition - recommend 1.9L bag running over 14hrs (135ml/hr), providing 380g Dex, 120g AA, 50g SMOF lipids daily; provides 2272 kcals, 120g protein daily, GIR 4.81 mg/kg/min   - provides 26.3 kcals/kg, 20.7 non-protein kcals/kg, 1.4g protein/kg  - monitor weights & wound healing, adjust substrates as indicated  - fluid & lytes per team  - MVI, thiamine, vit C in bag  2. Selenium & copper in TPN daily  - monitor zinc levels and adjust prn  - recheck levels in 4 weeks  - c/w Vit D supplementation and recheck levels in 4 wks  3. Recommend weekly weights for trending/ensuring adequacy of nutrition provision  4. PO diet per team discretion  -  c/w Regular diet as medically feasible to allow for more menu options  - c/w 1 LPS BID [200 kcals, 30g protein] + 1 Ensure Max daily [150 kcals, 30g protein] as amenable  - consider NPO for bowel rest as indicated   - ongoing education prn  5. Hydration per team  - risk for dehydration with high outputs, monitor  - additional fluids per team  - consider oral rehydration solution  6. Weekly lipid panel while on TPN  - hold/decrease lipids if TG >400  - continue to trend LFTs  7. Monitor BMP/Mg/Phos, POC BG while on TPN  - monitor & replenish lytes outside TPN bag prn  8. Continue close monitoring of clinical course & adjust recommendations prn    Education: ongoing diet education regarding current diet order    Risk Level: High [ X  ] Moderate [   ] Low [   ]

## 2024-01-08 NOTE — PROGRESS NOTE ADULT - SUBJECTIVE AND OBJECTIVE BOX
STATUS POST:    : Laparoscopic lysis of adhesions, converted to open lysis of adhesions, SBR x 3, temporary abdominal closure, 5L cryst, 1L 5% alb, 3 pRBC, 1 FFP, 1 plts  : ex lap, removal of abthera, ileocolic resection, small bowel anastamosis, , 1.6 crystalloid, 250 5%, 175 uop   7/3: IR Gendel drained perihepatic aspiration of serous fluid.   : RTOR exlap, washout, ileocolic resection with end ileostomy, blow hole colostomy, fistula, red rubber from ileostomy to small bowel anastomosis; vicryl bridging mesh; R JOYCE below ileostomy, L JOYCE at small bowel enterotomy repair; 500 LR, 500 5% albumin, 3u PRBC, 2 FFP, 400 UOP,   : s/p perc cony.    SUBJECTIVE: Pt seen and examined at bedside this am by surgery team. Patient is lying comfortably in bed with no complaints. Tolerating diet, pain well controlled with current regimen. Patient denies fever, nausea, vomiting, chest pain, and shortness of breath.     MEDICATIONS  (STANDING):  chlorhexidine 2% Cloths 1 Application(s) Topical <User Schedule>  cholestyramine Powder (Sugar-Free) 4 Gram(s) Oral daily  EPINEPHrine   1 mG/mL (1:1,000) Topical Solution 1 Application(s) Topical once  epoetin matt-epbx (RETACRIT) Injectable 52456 Unit(s) SubCutaneous every 7 days  gabapentin 100 milliGRAM(s) Oral at bedtime  glucagon  Injectable 1 milliGRAM(s) IntraMuscular once  heparin   Injectable 5000 Unit(s) SubCutaneous every 8 hours  levothyroxine Injectable 30 MICROGram(s) IV Push at bedtime  lipid, fat emulsion (Fish Oil and Plant Based) 20% Infusion 0.54 Gm/kG/Day (20.83 mL/Hr) IV Continuous <Continuous>  metoprolol tartrate Injectable 10 milliGRAM(s) IV Push every 4 hours  Parenteral Nutrition - Adult 1 Each TPN Continuous <Continuous>  Parenteral Nutrition - Adult 1 Each TPN Continuous <Continuous>  ursodiol Suspension 300 milliGRAM(s) Oral every 8 hours  venlafaxine XR. 150 milliGRAM(s) Oral daily    MEDICATIONS  (PRN):  chlorhexidine 0.12% Liquid 15 milliLiter(s) Swish and Spit two times a day PRN oral hygeine  hydrALAZINE Injectable 10 milliGRAM(s) IV Push every 6 hours PRN SBP >170  LORazepam   Injectable 1 milliGRAM(s) IV Push <User Schedule> PRN Anxiety  ondansetron Injectable 4 milliGRAM(s) IV Push every 6 hours PRN Nausea and/or Vomiting      Vital Signs Last 24 Hrs  T(C): 36.3 (2024 08:30), Max: 37.1 (2024 06:21)  T(F): 97.3 (2024 08:30), Max: 98.7 (2024 06:21)  HR: 95 (2024 10:12) (95 - 107)  BP: 149/77 (2024 09:15) (145/68 - 170/81)  BP(mean): 106 (2024 09:15) (98 - 117)  RR: 21 (2024 10:12) (20 - 31)  SpO2: 97% (2024 10:12) (95% - 100%)    Parameters below as of 2024 10:12  Patient On (Oxygen Delivery Method): room air        Physical Exam  General: Patient is doing well and lying in bed comfortably  Constitutional: alert and oriented   Pulm: Nonlabored breathing, no respiratory distress  CV: Regular rate and rhythm, normal sinus rhythm  Abd: soft, nontender, nondistended. No rebound, no guarding. Wound manager in place without leak this AM, dark brown/green output.  Extremities: warm, well perfused, no edema    I&O's Detail    2024 07:01  -  2024 07:00  --------------------------------------------------------  IN:    Albumin 25%  -  50 mL: 150 mL    Fat Emulsion (Fish Oil &amp; Plant Based) 20% Infusion: 273 mL    Oral Fluid: 480 mL    TPN (Total Parenteral Nutrition): 2192 mL  Total IN: 3095 mL    OUT:    Drain (mL): 2285 mL    Voided (mL): 2010 mL  Total OUT: 4295 mL    Total NET: -1200 mL      2024 07:01  -  2024 13:14  --------------------------------------------------------  IN:    Oral Fluid: 120 mL    TPN (Total Parenteral Nutrition): 138 mL  Total IN: 258 mL    OUT:    Drain (mL): 350 mL    Voided (mL): 200 mL  Total OUT: 550 mL    Total NET: -292 mL        LABS:                        8.6    8.16  )-----------( 184      ( 2024 05:40 )             27.2         137  |  104  |  37<H>  ----------------------------<  132<H>  4.4   |  24  |  1.33<H>    Ca    8.4      2024 05:40  Phos  3.7       Mg     2.0         TPro  8.5<H>  /  Alb  2.5<L>  /  TBili  6.4<H>  /  DBili  4.1<H>  /  AST  87<H>  /  ALT  57<H>  /  AlkPhos  110        Urinalysis Basic - ( 2024 19:53 )    Color: Dark Yellow / Appearance: Clear / S.018 / pH: x  Gluc: x / Ketone: Negative mg/dL  / Bili: Moderate / Urobili: 1.0 mg/dL   Blood: x / Protein: 30 mg/dL / Nitrite: Negative   Leuk Esterase: Small / RBC: 4 /HPF / WBC 2 /HPF   Sq Epi: x / Non Sq Epi: 0 /HPF / Bacteria: Negative /HPF     STATUS POST:    : Laparoscopic lysis of adhesions, converted to open lysis of adhesions, SBR x 3, temporary abdominal closure, 5L cryst, 1L 5% alb, 3 pRBC, 1 FFP, 1 plts  : ex lap, removal of abthera, ileocolic resection, small bowel anastamosis, , 1.6 crystalloid, 250 5%, 175 uop   7/3: IR Gendel drained perihepatic aspiration of serous fluid.   : RTOR exlap, washout, ileocolic resection with end ileostomy, blow hole colostomy, fistula, red rubber from ileostomy to small bowel anastomosis; vicryl bridging mesh; R JOYCE below ileostomy, L JOYCE at small bowel enterotomy repair; 500 LR, 500 5% albumin, 3u PRBC, 2 FFP, 400 UOP,   : s/p perc cony.    SUBJECTIVE: Pt seen and examined at bedside this am by surgery team. Patient is lying comfortably in bed with no complaints. Tolerating diet, pain well controlled with current regimen. Patient denies fever, nausea, vomiting, chest pain, and shortness of breath.     MEDICATIONS  (STANDING):  chlorhexidine 2% Cloths 1 Application(s) Topical <User Schedule>  cholestyramine Powder (Sugar-Free) 4 Gram(s) Oral daily  EPINEPHrine   1 mG/mL (1:1,000) Topical Solution 1 Application(s) Topical once  epoetin matt-epbx (RETACRIT) Injectable 16680 Unit(s) SubCutaneous every 7 days  gabapentin 100 milliGRAM(s) Oral at bedtime  glucagon  Injectable 1 milliGRAM(s) IntraMuscular once  heparin   Injectable 5000 Unit(s) SubCutaneous every 8 hours  levothyroxine Injectable 30 MICROGram(s) IV Push at bedtime  lipid, fat emulsion (Fish Oil and Plant Based) 20% Infusion 0.54 Gm/kG/Day (20.83 mL/Hr) IV Continuous <Continuous>  metoprolol tartrate Injectable 10 milliGRAM(s) IV Push every 4 hours  Parenteral Nutrition - Adult 1 Each TPN Continuous <Continuous>  Parenteral Nutrition - Adult 1 Each TPN Continuous <Continuous>  ursodiol Suspension 300 milliGRAM(s) Oral every 8 hours  venlafaxine XR. 150 milliGRAM(s) Oral daily    MEDICATIONS  (PRN):  chlorhexidine 0.12% Liquid 15 milliLiter(s) Swish and Spit two times a day PRN oral hygeine  hydrALAZINE Injectable 10 milliGRAM(s) IV Push every 6 hours PRN SBP >170  LORazepam   Injectable 1 milliGRAM(s) IV Push <User Schedule> PRN Anxiety  ondansetron Injectable 4 milliGRAM(s) IV Push every 6 hours PRN Nausea and/or Vomiting      Vital Signs Last 24 Hrs  T(C): 36.3 (2024 08:30), Max: 37.1 (2024 06:21)  T(F): 97.3 (2024 08:30), Max: 98.7 (2024 06:21)  HR: 95 (2024 10:12) (95 - 107)  BP: 149/77 (2024 09:15) (145/68 - 170/81)  BP(mean): 106 (2024 09:15) (98 - 117)  RR: 21 (2024 10:12) (20 - 31)  SpO2: 97% (2024 10:12) (95% - 100%)    Parameters below as of 2024 10:12  Patient On (Oxygen Delivery Method): room air        Physical Exam  General: Patient is doing well and lying in bed comfortably  Constitutional: alert and oriented   Pulm: Nonlabored breathing, no respiratory distress  CV: Regular rate and rhythm, normal sinus rhythm  Abd: soft, nontender, nondistended. No rebound, no guarding. Wound manager in place without leak this AM, dark brown/green output.  Extremities: warm, well perfused, no edema    I&O's Detail    2024 07:01  -  2024 07:00  --------------------------------------------------------  IN:    Albumin 25%  -  50 mL: 150 mL    Fat Emulsion (Fish Oil &amp; Plant Based) 20% Infusion: 273 mL    Oral Fluid: 480 mL    TPN (Total Parenteral Nutrition): 2192 mL  Total IN: 3095 mL    OUT:    Drain (mL): 2285 mL    Voided (mL): 2010 mL  Total OUT: 4295 mL    Total NET: -1200 mL      2024 07:01  -  2024 13:14  --------------------------------------------------------  IN:    Oral Fluid: 120 mL    TPN (Total Parenteral Nutrition): 138 mL  Total IN: 258 mL    OUT:    Drain (mL): 350 mL    Voided (mL): 200 mL  Total OUT: 550 mL    Total NET: -292 mL        LABS:                        8.6    8.16  )-----------( 184      ( 2024 05:40 )             27.2         137  |  104  |  37<H>  ----------------------------<  132<H>  4.4   |  24  |  1.33<H>    Ca    8.4      2024 05:40  Phos  3.7       Mg     2.0         TPro  8.5<H>  /  Alb  2.5<L>  /  TBili  6.4<H>  /  DBili  4.1<H>  /  AST  87<H>  /  ALT  57<H>  /  AlkPhos  110        Urinalysis Basic - ( 2024 19:53 )    Color: Dark Yellow / Appearance: Clear / S.018 / pH: x  Gluc: x / Ketone: Negative mg/dL  / Bili: Moderate / Urobili: 1.0 mg/dL   Blood: x / Protein: 30 mg/dL / Nitrite: Negative   Leuk Esterase: Small / RBC: 4 /HPF / WBC 2 /HPF   Sq Epi: x / Non Sq Epi: 0 /HPF / Bacteria: Negative /HPF

## 2024-01-08 NOTE — PROGRESS NOTE ADULT - ASSESSMENT
78 year old male with a PMHx of Crohn's, AFib/Flutter s/p DCCVs on amiodarone, remote ileocectomy and open appendectomy. Admitted (6/23) for SBO vs Crohns flare, s/p NGT decompression and s/p lap converted to open TRE, SBR x 3, left in discontinuity with abthera vac on (6/27), RTOR for ileocolic resection, small bowel anastomosis, and abdominal wall closure on (6/28), c/b fluid collection s/p IR aspiration of perihepatic fluid on (7/3), c/b wound dehiscence s/p RTOR exlap, washout, ileocolic resection with end ileostomy, blow hole colostomy, red rubber from ileostomy to small bowel anastomosis; vicryl bridging mesh on (7/5) transferred to SICU postoperatively for hemodynamic monitoring, with hospital course complicated by periods of severe ELVI and hyponatremia, which resolved but stepped back up for to SICU on (9/10) for acute AMS, intermittent hypoglycemia, AFib with RVR. Percutaneous Cholecystostomy placed on (9/11) for enlarged GB, PCT capped 10/5 and uncapped 10/25 for hyperbilirubinemia, Right brachial DVT, left basillic and cephalic superficial thrombus on duplex 11/2, CT 11/14 with ALDEN ground glass opacity of unclear etiology, completed empiric 7day cefepime course 11/22, rising T-bili of unclear etiology now w resolved candida glabrata fungemia, ELVI improving.     - Continue regular diet and TPN   - Monitor I&Os   - Continue wound care   - Out of bed and ambulation   - Team 5 will follow 78 year old male with a PMHx of Crohn's, AFib/Flutter s/p DCCVs on amiodarone, remote ileocectomy and open appendectomy. Admitted (6/23) for SBO vs Crohns flare, s/p NGT decompression and s/p lap converted to open TRE, SBR x 3, left in discontinuity with abthera vac on (6/27), RTOR for ileocolic resection, small bowel anastomosis, and abdominal wall closure on (6/28), c/b fluid collection s/p IR aspiration of perihepatic fluid on (7/3), c/b wound dehiscence s/p RTOR exlap, washout, ileocolic resection with end ileostomy, blow hole colostomy, red rubber from ileostomy to small bowel anastomosis; vicryl bridging mesh on (7/5) transferred to SICU postoperatively for hemodynamic monitoring, with hospital course complicated by periods of severe ELVI and hyponatremia, which resolved but stepped back up for to SICU on (9/10) for acute AMS, intermittent hypoglycemia, AFib with RVR. Percutaneous Cholecystostomy placed on (9/11) for enlarged GB, PCT capped 10/5 and uncapped 10/25 for hyperbilirubinemia, Right brachial DVT, left basillic and cephalic superficial thrombus on duplex 11/2, CT 11/14 with ALDEN ground glass opacity of unclear etiology, completed empiric 7day cefepime course 11/22, rising T-bili of unclear etiology now w resolved candida glabrata fungemia, ELVI improving.     - Continue regular diet and TPN   - Monitor I&Os   - Continue wound care   - Out of bed and ambulation   - Team 5 will follow    Plans to be discussed with attending and chief resident for finalization.

## 2024-01-08 NOTE — PROGRESS NOTE ADULT - ASSESSMENT
The patient is a 78 year old male with a PMHx of Crohn's, AFib/Flutter s/p DCCVs on amiodarone, remote ileocectomy and open appendectomy. Admitted (6/23) for SBO vs Crohns flare, s/p NGT decompression and s/p lap converted to open TRE, SBR x 3, left in discontinuity with abthera vac on (6/27), RTOR for ileocolic resection, small bowel anastomosis, and abdominal wall closure on (6/28), c/b fluid collection s/p IR aspiration of perihepatic fluid on (7/3), c/b wound dehiscence s/p RTOR exlap, washout, ileocolic resection with end ileostomy, blow hole colostomy, red rubber from ileostomy to small bowel anastomosis; vicryl bridging mesh on (7/5) transferred to SICU postoperatively for hemodynamic monitoring, with hospital course complicated by periods of severe ELVI and hyponatremia, which resolved but stepped back up for to SICU on (9/10) for acute AMS, intermittent hypoglycemia, AFib with RVR. Percutaneous Cholecystostomy placed on (9/11) for enlarged GB, PCT capped 10/5 and uncapped 10/25 for hyperbilirubinemia, Right brachial DVT, left basillic and cephalic superficial thrombus on duplex 11/2, CT 11/14 with ALDEN ground glass opacity of unclear etiology, completed empiric 7day cefepime course 11/22, rising T-bili of unclear etiology now w resolved candida glabrata fungemia, ELVI improving.     Reg Diet  Pain/nausea control prn  SQH/SCDs/OOBA/IS  BiPAP q12h prn  Wound manager changes daily  C/W octreotide, cholestyramine  C/W Epogen weekly  Rest of care per SICU

## 2024-01-08 NOTE — PROGRESS NOTE ADULT - SUBJECTIVE AND OBJECTIVE BOX
Overnight events:       POD#    SUBJECTIVE:      MEDICATIONS  (STANDING):  albumin human 25% IVPB 50 milliLiter(s) IV Intermittent every 8 hours  chlorhexidine 2% Cloths 1 Application(s) Topical <User Schedule>  cholestyramine Powder (Sugar-Free) 4 Gram(s) Oral daily  EPINEPHrine   1 mG/mL (1:1,000) Topical Solution 1 Application(s) Topical once  EPINEPHrine   1 mG/mL (1:1,000) Topical Solution 1 Application(s) Topical daily  epoetin matt-epbx (RETACRIT) Injectable 74099 Unit(s) SubCutaneous every 7 days  gabapentin 100 milliGRAM(s) Oral at bedtime  glucagon  Injectable 1 milliGRAM(s) IntraMuscular once  heparin   Injectable 5000 Unit(s) SubCutaneous every 8 hours  levothyroxine Injectable 30 MICROGram(s) IV Push at bedtime  lipid, fat emulsion (Fish Oil and Plant Based) 20% Infusion 0.54 Gm/kG/Day (21 mL/Hr) IV Continuous <Continuous>  metoprolol tartrate Injectable 10 milliGRAM(s) IV Push every 4 hours  Parenteral Nutrition - Adult 1 Each TPN Continuous <Continuous>  ursodiol Suspension 300 milliGRAM(s) Oral every 8 hours  venlafaxine XR. 150 milliGRAM(s) Oral daily    MEDICATIONS  (PRN):  chlorhexidine 0.12% Liquid 15 milliLiter(s) Swish and Spit two times a day PRN oral hygeine  hydrALAZINE Injectable 10 milliGRAM(s) IV Push every 6 hours PRN SBP >170  LORazepam   Injectable 1 milliGRAM(s) IV Push <User Schedule> PRN Anxiety  ondansetron Injectable 4 milliGRAM(s) IV Push every 6 hours PRN Nausea and/or Vomiting      Vital Signs Last 24 Hrs  T(C): 37.1 (2024 06:21), Max: 37.1 (2024 06:21)  T(F): 98.7 (2024 06:21), Max: 98.7 (2024 06:21)  HR: 105 (2024 05:47) (85 - 108)  BP: 145/68 (2024 01:22) (145/68 - 170/81)  BP(mean): 98 (2024 01:22) (98 - 117)  RR: 22 (2024 04:00) (18 - 31)  SpO2: 96% (2024 05:47) (95% - 100%)    Parameters below as of 2024 04:00  Patient On (Oxygen Delivery Method): BiPAP/CPAP    O2 Concentration (%): 40    Physical Exam:  General: NAD, resting comfortably in bed  Pulmonary: Nonlabored breathing, no respiratory distress  Cardiovascular: NSR  Abdominal: soft, NT/ND  Extremities: WWP, normal strength  Neuro: A/O x 3, CNs II-XII grossly intact, no focal deficits, normal motor/sensation  Pulses: palpable distal pulses    I&O's Summary    2024 07:01  -  2024 07:00  --------------------------------------------------------  IN: 4479.6 mL / OUT: 4415 mL / NET: 64.6 mL    2024 07:01  -  2024 06:22  --------------------------------------------------------  IN: 3698 mL / OUT: 3695 mL / NET: 3 mL        LABS:                        8.6    8.16  )-----------( 184      ( 2024 05:40 )             27.2     -07    137  |  104  |  37<H>  ----------------------------<  132<H>  4.4   |  24  |  1.33<H>    Ca    8.4      2024 05:40  Phos  3.7       Mg     2.0         TPro  8.5<H>  /  Alb  2.5<L>  /  TBili  6.4<H>  /  DBili  4.1<H>  /  AST  87<H>  /  ALT  57<H>  /  AlkPhos  110  -      Urinalysis Basic - ( 2024 19:53 )    Color: Dark Yellow / Appearance: Clear / S.018 / pH: x  Gluc: x / Ketone: Negative mg/dL  / Bili: Moderate / Urobili: 1.0 mg/dL   Blood: x / Protein: 30 mg/dL / Nitrite: Negative   Leuk Esterase: Small / RBC: 4 /HPF / WBC 2 /HPF   Sq Epi: x / Non Sq Epi: 0 /HPF / Bacteria: Negative /HPF      CAPILLARY BLOOD GLUCOSE        LIVER FUNCTIONS - ( 2024 05:40 )  Alb: 2.5 g/dL / Pro: 8.5 g/dL / ALK PHOS: 110 U/L / ALT: 57 U/L / AST: 87 U/L / GGT: x             RADIOLOGY & ADDITIONAL STUDIES:   Overnight events:       POD#    SUBJECTIVE:      MEDICATIONS  (STANDING):  albumin human 25% IVPB 50 milliLiter(s) IV Intermittent every 8 hours  chlorhexidine 2% Cloths 1 Application(s) Topical <User Schedule>  cholestyramine Powder (Sugar-Free) 4 Gram(s) Oral daily  EPINEPHrine   1 mG/mL (1:1,000) Topical Solution 1 Application(s) Topical once  EPINEPHrine   1 mG/mL (1:1,000) Topical Solution 1 Application(s) Topical daily  epoetin matt-epbx (RETACRIT) Injectable 53709 Unit(s) SubCutaneous every 7 days  gabapentin 100 milliGRAM(s) Oral at bedtime  glucagon  Injectable 1 milliGRAM(s) IntraMuscular once  heparin   Injectable 5000 Unit(s) SubCutaneous every 8 hours  levothyroxine Injectable 30 MICROGram(s) IV Push at bedtime  lipid, fat emulsion (Fish Oil and Plant Based) 20% Infusion 0.54 Gm/kG/Day (21 mL/Hr) IV Continuous <Continuous>  metoprolol tartrate Injectable 10 milliGRAM(s) IV Push every 4 hours  Parenteral Nutrition - Adult 1 Each TPN Continuous <Continuous>  ursodiol Suspension 300 milliGRAM(s) Oral every 8 hours  venlafaxine XR. 150 milliGRAM(s) Oral daily    MEDICATIONS  (PRN):  chlorhexidine 0.12% Liquid 15 milliLiter(s) Swish and Spit two times a day PRN oral hygeine  hydrALAZINE Injectable 10 milliGRAM(s) IV Push every 6 hours PRN SBP >170  LORazepam   Injectable 1 milliGRAM(s) IV Push <User Schedule> PRN Anxiety  ondansetron Injectable 4 milliGRAM(s) IV Push every 6 hours PRN Nausea and/or Vomiting      Vital Signs Last 24 Hrs  T(C): 37.1 (2024 06:21), Max: 37.1 (2024 06:21)  T(F): 98.7 (2024 06:21), Max: 98.7 (2024 06:21)  HR: 105 (2024 05:47) (85 - 108)  BP: 145/68 (2024 01:22) (145/68 - 170/81)  BP(mean): 98 (2024 01:22) (98 - 117)  RR: 22 (2024 04:00) (18 - 31)  SpO2: 96% (2024 05:47) (95% - 100%)    Parameters below as of 2024 04:00  Patient On (Oxygen Delivery Method): BiPAP/CPAP    O2 Concentration (%): 40    Physical Exam:  General: NAD, resting comfortably in bed  Pulmonary: Nonlabored breathing, no respiratory distress  Cardiovascular: NSR  Abdominal: soft, NT/ND  Extremities: WWP, normal strength  Neuro: A/O x 3, CNs II-XII grossly intact, no focal deficits, normal motor/sensation  Pulses: palpable distal pulses    I&O's Summary    2024 07:01  -  2024 07:00  --------------------------------------------------------  IN: 4479.6 mL / OUT: 4415 mL / NET: 64.6 mL    2024 07:01  -  2024 06:22  --------------------------------------------------------  IN: 3698 mL / OUT: 3695 mL / NET: 3 mL        LABS:                        8.6    8.16  )-----------( 184      ( 2024 05:40 )             27.2     -07    137  |  104  |  37<H>  ----------------------------<  132<H>  4.4   |  24  |  1.33<H>    Ca    8.4      2024 05:40  Phos  3.7       Mg     2.0         TPro  8.5<H>  /  Alb  2.5<L>  /  TBili  6.4<H>  /  DBili  4.1<H>  /  AST  87<H>  /  ALT  57<H>  /  AlkPhos  110  -      Urinalysis Basic - ( 2024 19:53 )    Color: Dark Yellow / Appearance: Clear / S.018 / pH: x  Gluc: x / Ketone: Negative mg/dL  / Bili: Moderate / Urobili: 1.0 mg/dL   Blood: x / Protein: 30 mg/dL / Nitrite: Negative   Leuk Esterase: Small / RBC: 4 /HPF / WBC 2 /HPF   Sq Epi: x / Non Sq Epi: 0 /HPF / Bacteria: Negative /HPF      CAPILLARY BLOOD GLUCOSE        LIVER FUNCTIONS - ( 2024 05:40 )  Alb: 2.5 g/dL / Pro: 8.5 g/dL / ALK PHOS: 110 U/L / ALT: 57 U/L / AST: 87 U/L / GGT: x             RADIOLOGY & ADDITIONAL STUDIES:   Overnight events: No acute overnight events.       POD#    6: Laparoscopic lysis of adhesions, converted to open lysis of adhesions, SBR x 3, temporary abdominal closure, 5L cryst, 1L 5% alb, 3 pRBC, 1 FFP, 1 plts  : ex lap, removal of abthera, ileocolic resection, small bowel anastamosis, , 1.6 crystalloid, 250 5%, 175 uop   7/3: IR Gendel drained perihepatic aspiration of serous fluid.   : RTOR exlap, washout, ileocolic resection with end ileostomy, blow hole colostomy, fistula, red rubber from ileostomy to small bowel anastomosis; vicryl bridging mesh; R JOYCE below ileostomy, L JOYCE at small bowel enterotomy repair; 500 LR, 500 5% albumin, 3u PRBC, 2 FFP, 400 UOP,   : s/p perc cony    SUBJECTIVE: Patient seen at bedside with chief resident. Patient denies any nausea or vomiting.       MEDICATIONS  (STANDING):  albumin human 25% IVPB 50 milliLiter(s) IV Intermittent every 8 hours  chlorhexidine 2% Cloths 1 Application(s) Topical <User Schedule>  cholestyramine Powder (Sugar-Free) 4 Gram(s) Oral daily  EPINEPHrine   1 mG/mL (1:1,000) Topical Solution 1 Application(s) Topical once  EPINEPHrine   1 mG/mL (1:1,000) Topical Solution 1 Application(s) Topical daily  epoetin matt-epbx (RETACRIT) Injectable 47424 Unit(s) SubCutaneous every 7 days  gabapentin 100 milliGRAM(s) Oral at bedtime  glucagon  Injectable 1 milliGRAM(s) IntraMuscular once  heparin   Injectable 5000 Unit(s) SubCutaneous every 8 hours  levothyroxine Injectable 30 MICROGram(s) IV Push at bedtime  lipid, fat emulsion (Fish Oil and Plant Based) 20% Infusion 0.54 Gm/kG/Day (21 mL/Hr) IV Continuous <Continuous>  metoprolol tartrate Injectable 10 milliGRAM(s) IV Push every 4 hours  Parenteral Nutrition - Adult 1 Each TPN Continuous <Continuous>  ursodiol Suspension 300 milliGRAM(s) Oral every 8 hours  venlafaxine XR. 150 milliGRAM(s) Oral daily    MEDICATIONS  (PRN):  chlorhexidine 0.12% Liquid 15 milliLiter(s) Swish and Spit two times a day PRN oral hygeine  hydrALAZINE Injectable 10 milliGRAM(s) IV Push every 6 hours PRN SBP >170  LORazepam   Injectable 1 milliGRAM(s) IV Push <User Schedule> PRN Anxiety  ondansetron Injectable 4 milliGRAM(s) IV Push every 6 hours PRN Nausea and/or Vomiting      Vital Signs Last 24 Hrs  T(C): 37.1 (2024 06:21), Max: 37.1 (2024 06:21)  T(F): 98.7 (2024 06:21), Max: 98.7 (2024 06:21)  HR: 105 (2024 05:47) (85 - 108)  BP: 145/68 (2024 01:22) (145/68 - 170/81)  BP(mean): 98 (2024 01:22) (98 - 117)  RR: 22 (2024 04:00) (18 - 31)  SpO2: 96% (2024 05:47) (95% - 100%)    Parameters below as of 2024 04:00  Patient On (Oxygen Delivery Method): BiPAP/CPAP    O2 Concentration (%): 40    Physical Exam:  General: NAD, resting comfortably in bed  Pulmonary: Nonlabored breathing, no respiratory distress  Cardiovascular: NSR  Abdominal: soft, NT/ND, left ostomy with yellow liquid, right no blood, green output  Extremities: WWP, normal strength  Neuro: A/O x 3, CNs II-XII grossly intact,     I&O's Summary    2024 07:01  -  2024 07:00  --------------------------------------------------------  IN: 4479.6 mL / OUT: 4415 mL / NET: 64.6 mL    2024 07:01  -  2024 06:22  --------------------------------------------------------  IN: 3698 mL / OUT: 3695 mL / NET: 3 mL        LABS:                        8.6    8.16  )-----------( 184      ( 2024 05:40 )             27.2     01-    137  |  104  |  37<H>  ----------------------------<  132<H>  4.4   |  24  |  1.33<H>    Ca    8.4      2024 05:40  Phos  3.7       Mg     2.0         TPro  8.5<H>  /  Alb  2.5<L>  /  TBili  6.4<H>  /  DBili  4.1<H>  /  AST  87<H>  /  ALT  57<H>  /  AlkPhos  110        Urinalysis Basic - ( 2024 19:53 )    Color: Dark Yellow / Appearance: Clear / S.018 / pH: x  Gluc: x / Ketone: Negative mg/dL  / Bili: Moderate / Urobili: 1.0 mg/dL   Blood: x / Protein: 30 mg/dL / Nitrite: Negative   Leuk Esterase: Small / RBC: 4 /HPF / WBC 2 /HPF   Sq Epi: x / Non Sq Epi: 0 /HPF / Bacteria: Negative /HPF      CAPILLARY BLOOD GLUCOSE        LIVER FUNCTIONS - ( 2024 05:40 )  Alb: 2.5 g/dL / Pro: 8.5 g/dL / ALK PHOS: 110 U/L / ALT: 57 U/L / AST: 87 U/L / GGT: x             RADIOLOGY & ADDITIONAL STUDIES:   Overnight events: No acute overnight events.       POD#    6: Laparoscopic lysis of adhesions, converted to open lysis of adhesions, SBR x 3, temporary abdominal closure, 5L cryst, 1L 5% alb, 3 pRBC, 1 FFP, 1 plts  : ex lap, removal of abthera, ileocolic resection, small bowel anastamosis, , 1.6 crystalloid, 250 5%, 175 uop   7/3: IR Gendel drained perihepatic aspiration of serous fluid.   : RTOR exlap, washout, ileocolic resection with end ileostomy, blow hole colostomy, fistula, red rubber from ileostomy to small bowel anastomosis; vicryl bridging mesh; R JOYCE below ileostomy, L JOYCE at small bowel enterotomy repair; 500 LR, 500 5% albumin, 3u PRBC, 2 FFP, 400 UOP,   : s/p perc cony    SUBJECTIVE: Patient seen at bedside with chief resident. Patient denies any nausea or vomiting.       MEDICATIONS  (STANDING):  albumin human 25% IVPB 50 milliLiter(s) IV Intermittent every 8 hours  chlorhexidine 2% Cloths 1 Application(s) Topical <User Schedule>  cholestyramine Powder (Sugar-Free) 4 Gram(s) Oral daily  EPINEPHrine   1 mG/mL (1:1,000) Topical Solution 1 Application(s) Topical once  EPINEPHrine   1 mG/mL (1:1,000) Topical Solution 1 Application(s) Topical daily  epoetin matt-epbx (RETACRIT) Injectable 20707 Unit(s) SubCutaneous every 7 days  gabapentin 100 milliGRAM(s) Oral at bedtime  glucagon  Injectable 1 milliGRAM(s) IntraMuscular once  heparin   Injectable 5000 Unit(s) SubCutaneous every 8 hours  levothyroxine Injectable 30 MICROGram(s) IV Push at bedtime  lipid, fat emulsion (Fish Oil and Plant Based) 20% Infusion 0.54 Gm/kG/Day (21 mL/Hr) IV Continuous <Continuous>  metoprolol tartrate Injectable 10 milliGRAM(s) IV Push every 4 hours  Parenteral Nutrition - Adult 1 Each TPN Continuous <Continuous>  ursodiol Suspension 300 milliGRAM(s) Oral every 8 hours  venlafaxine XR. 150 milliGRAM(s) Oral daily    MEDICATIONS  (PRN):  chlorhexidine 0.12% Liquid 15 milliLiter(s) Swish and Spit two times a day PRN oral hygeine  hydrALAZINE Injectable 10 milliGRAM(s) IV Push every 6 hours PRN SBP >170  LORazepam   Injectable 1 milliGRAM(s) IV Push <User Schedule> PRN Anxiety  ondansetron Injectable 4 milliGRAM(s) IV Push every 6 hours PRN Nausea and/or Vomiting      Vital Signs Last 24 Hrs  T(C): 37.1 (2024 06:21), Max: 37.1 (2024 06:21)  T(F): 98.7 (2024 06:21), Max: 98.7 (2024 06:21)  HR: 105 (2024 05:47) (85 - 108)  BP: 145/68 (2024 01:22) (145/68 - 170/81)  BP(mean): 98 (2024 01:22) (98 - 117)  RR: 22 (2024 04:00) (18 - 31)  SpO2: 96% (2024 05:47) (95% - 100%)    Parameters below as of 2024 04:00  Patient On (Oxygen Delivery Method): BiPAP/CPAP    O2 Concentration (%): 40    Physical Exam:  General: NAD, resting comfortably in bed  Pulmonary: Nonlabored breathing, no respiratory distress  Cardiovascular: NSR  Abdominal: soft, NT/ND, left ostomy with yellow liquid, right no blood, green output  Extremities: WWP, normal strength  Neuro: A/O x 3, CNs II-XII grossly intact,     I&O's Summary    2024 07:01  -  2024 07:00  --------------------------------------------------------  IN: 4479.6 mL / OUT: 4415 mL / NET: 64.6 mL    2024 07:01  -  2024 06:22  --------------------------------------------------------  IN: 3698 mL / OUT: 3695 mL / NET: 3 mL        LABS:                        8.6    8.16  )-----------( 184      ( 2024 05:40 )             27.2     01-    137  |  104  |  37<H>  ----------------------------<  132<H>  4.4   |  24  |  1.33<H>    Ca    8.4      2024 05:40  Phos  3.7       Mg     2.0         TPro  8.5<H>  /  Alb  2.5<L>  /  TBili  6.4<H>  /  DBili  4.1<H>  /  AST  87<H>  /  ALT  57<H>  /  AlkPhos  110        Urinalysis Basic - ( 2024 19:53 )    Color: Dark Yellow / Appearance: Clear / S.018 / pH: x  Gluc: x / Ketone: Negative mg/dL  / Bili: Moderate / Urobili: 1.0 mg/dL   Blood: x / Protein: 30 mg/dL / Nitrite: Negative   Leuk Esterase: Small / RBC: 4 /HPF / WBC 2 /HPF   Sq Epi: x / Non Sq Epi: 0 /HPF / Bacteria: Negative /HPF      CAPILLARY BLOOD GLUCOSE        LIVER FUNCTIONS - ( 2024 05:40 )  Alb: 2.5 g/dL / Pro: 8.5 g/dL / ALK PHOS: 110 U/L / ALT: 57 U/L / AST: 87 U/L / GGT: x             RADIOLOGY & ADDITIONAL STUDIES:

## 2024-01-09 NOTE — PROGRESS NOTE ADULT - SUBJECTIVE AND OBJECTIVE BOX
Overnight events:       POD#    SUBJECTIVE:      MEDICATIONS  (STANDING):  chlorhexidine 2% Cloths 1 Application(s) Topical <User Schedule>  cholestyramine Powder (Sugar-Free) 4 Gram(s) Oral daily  EPINEPHrine   1 mG/mL (1:1,000) Topical Solution 1 Application(s) Topical once  epoetin matt-epbx (RETACRIT) Injectable 63293 Unit(s) SubCutaneous every 7 days  gabapentin 100 milliGRAM(s) Oral at bedtime  glucagon  Injectable 1 milliGRAM(s) IntraMuscular once  heparin   Injectable 5000 Unit(s) SubCutaneous every 8 hours  levothyroxine Injectable 30 MICROGram(s) IV Push at bedtime  lipid, fat emulsion (Fish Oil and Plant Based) 20% Infusion 0.54 Gm/kG/Day (20.83 mL/Hr) IV Continuous <Continuous>  metoprolol tartrate Injectable 10 milliGRAM(s) IV Push every 4 hours  Parenteral Nutrition - Adult 1 Each TPN Continuous <Continuous>  ursodiol Suspension 300 milliGRAM(s) Oral every 8 hours  venlafaxine XR. 150 milliGRAM(s) Oral daily    MEDICATIONS  (PRN):  chlorhexidine 0.12% Liquid 15 milliLiter(s) Swish and Spit two times a day PRN oral hygeine  hydrALAZINE Injectable 10 milliGRAM(s) IV Push every 6 hours PRN SBP >170  LORazepam   Injectable 1 milliGRAM(s) IV Push <User Schedule> PRN Anxiety  ondansetron Injectable 4 milliGRAM(s) IV Push every 6 hours PRN Nausea and/or Vomiting      Vital Signs Last 24 Hrs  T(C): 36.4 (2024 05:30), Max: 37.1 (2024 06:21)  T(F): 97.5 (2024 05:30), Max: 98.7 (2024 06:21)  HR: 105 (2024 05:38) (91 - 107)  BP: 159/76 (2024 05:38) (121/64 - 168/83)  BP(mean): 109 (2024 05:38) (87 - 117)  RR: 21 (2024 05:38) (20 - 34)  SpO2: 98% (2024 05:38) (95% - 100%)    Parameters below as of 2024 05:38  Patient On (Oxygen Delivery Method): BiPAP/CPAP    O2 Concentration (%): 40    Physical Exam:  General: NAD, resting comfortably in bed  Pulmonary: Nonlabored breathing, no respiratory distress  Cardiovascular: NSR  Abdominal: soft, NT/ND  Extremities: WWP, normal strength  Neuro: A/O x 3, CNs II-XII grossly intact, no focal deficits, normal motor/sensation  Pulses: palpable distal pulses    I&O's Summary    2024 07:01  -  2024 07:00  --------------------------------------------------------  IN: 3095 mL / OUT: 4295 mL / NET: -1200 mL    2024 07:01  -  2024 06:18  --------------------------------------------------------  IN: 3492.4 mL / OUT: 3620 mL / NET: -127.6 mL        LABS:            Urinalysis Basic - ( 2024 19:53 )    Color: Dark Yellow / Appearance: Clear / S.018 / pH: x  Gluc: x / Ketone: Negative mg/dL  / Bili: Moderate / Urobili: 1.0 mg/dL   Blood: x / Protein: 30 mg/dL / Nitrite: Negative   Leuk Esterase: Small / RBC: 4 /HPF / WBC 2 /HPF   Sq Epi: x / Non Sq Epi: 0 /HPF / Bacteria: Negative /HPF      CAPILLARY BLOOD GLUCOSE            RADIOLOGY & ADDITIONAL STUDIES:   Overnight events:       POD#    SUBJECTIVE:      MEDICATIONS  (STANDING):  chlorhexidine 2% Cloths 1 Application(s) Topical <User Schedule>  cholestyramine Powder (Sugar-Free) 4 Gram(s) Oral daily  EPINEPHrine   1 mG/mL (1:1,000) Topical Solution 1 Application(s) Topical once  epoetin matt-epbx (RETACRIT) Injectable 21892 Unit(s) SubCutaneous every 7 days  gabapentin 100 milliGRAM(s) Oral at bedtime  glucagon  Injectable 1 milliGRAM(s) IntraMuscular once  heparin   Injectable 5000 Unit(s) SubCutaneous every 8 hours  levothyroxine Injectable 30 MICROGram(s) IV Push at bedtime  lipid, fat emulsion (Fish Oil and Plant Based) 20% Infusion 0.54 Gm/kG/Day (20.83 mL/Hr) IV Continuous <Continuous>  metoprolol tartrate Injectable 10 milliGRAM(s) IV Push every 4 hours  Parenteral Nutrition - Adult 1 Each TPN Continuous <Continuous>  ursodiol Suspension 300 milliGRAM(s) Oral every 8 hours  venlafaxine XR. 150 milliGRAM(s) Oral daily    MEDICATIONS  (PRN):  chlorhexidine 0.12% Liquid 15 milliLiter(s) Swish and Spit two times a day PRN oral hygeine  hydrALAZINE Injectable 10 milliGRAM(s) IV Push every 6 hours PRN SBP >170  LORazepam   Injectable 1 milliGRAM(s) IV Push <User Schedule> PRN Anxiety  ondansetron Injectable 4 milliGRAM(s) IV Push every 6 hours PRN Nausea and/or Vomiting      Vital Signs Last 24 Hrs  T(C): 36.4 (2024 05:30), Max: 37.1 (2024 06:21)  T(F): 97.5 (2024 05:30), Max: 98.7 (2024 06:21)  HR: 105 (2024 05:38) (91 - 107)  BP: 159/76 (2024 05:38) (121/64 - 168/83)  BP(mean): 109 (2024 05:38) (87 - 117)  RR: 21 (2024 05:38) (20 - 34)  SpO2: 98% (2024 05:38) (95% - 100%)    Parameters below as of 2024 05:38  Patient On (Oxygen Delivery Method): BiPAP/CPAP    O2 Concentration (%): 40    Physical Exam:  General: NAD, resting comfortably in bed  Pulmonary: Nonlabored breathing, no respiratory distress  Cardiovascular: NSR  Abdominal: soft, NT/ND  Extremities: WWP, normal strength  Neuro: A/O x 3, CNs II-XII grossly intact, no focal deficits, normal motor/sensation  Pulses: palpable distal pulses    I&O's Summary    2024 07:01  -  2024 07:00  --------------------------------------------------------  IN: 3095 mL / OUT: 4295 mL / NET: -1200 mL    2024 07:01  -  2024 06:18  --------------------------------------------------------  IN: 3492.4 mL / OUT: 3620 mL / NET: -127.6 mL        LABS:            Urinalysis Basic - ( 2024 19:53 )    Color: Dark Yellow / Appearance: Clear / S.018 / pH: x  Gluc: x / Ketone: Negative mg/dL  / Bili: Moderate / Urobili: 1.0 mg/dL   Blood: x / Protein: 30 mg/dL / Nitrite: Negative   Leuk Esterase: Small / RBC: 4 /HPF / WBC 2 /HPF   Sq Epi: x / Non Sq Epi: 0 /HPF / Bacteria: Negative /HPF      CAPILLARY BLOOD GLUCOSE            RADIOLOGY & ADDITIONAL STUDIES:   Overnight events: 700 @ 0300. Given 500cc bolus. Rates in 90s.       POD#  : Laparoscopic lysis of adhesions, converted to open lysis of adhesions, SBR x 3, temporary abdominal closure, 5L cryst, 1L 5% alb, 3 pRBC, 1 FFP, 1 plts  : ex lap, removal of abthera, ileocolic resection, small bowel anastamosis, , 1.6 crystalloid, 250 5%, 175 uop   7/3: IR Gendel drained perihepatic aspiration of serous fluid.   : RTOR exlap, washout, ileocolic resection with end ileostomy, blow hole colostomy, fistula, red rubber from ileostomy to small bowel anastomosis; vicryl bridging mesh; R JOYCE below ileostomy, L JOYCE at small bowel enterotomy repair; 500 LR, 500 5% albumin, 3u PRBC, 2 FFP, 400 UOP,   : s/p perc cony    SUBJECTIVE: Patient seen at bedside with chief resident. Patient denies any nausea or vomiting.       MEDICATIONS  (STANDING):  chlorhexidine 2% Cloths 1 Application(s) Topical <User Schedule>  cholestyramine Powder (Sugar-Free) 4 Gram(s) Oral daily  EPINEPHrine   1 mG/mL (1:1,000) Topical Solution 1 Application(s) Topical once  epoetin matt-epbx (RETACRIT) Injectable 07489 Unit(s) SubCutaneous every 7 days  gabapentin 100 milliGRAM(s) Oral at bedtime  glucagon  Injectable 1 milliGRAM(s) IntraMuscular once  heparin   Injectable 5000 Unit(s) SubCutaneous every 8 hours  levothyroxine Injectable 30 MICROGram(s) IV Push at bedtime  lipid, fat emulsion (Fish Oil and Plant Based) 20% Infusion 0.54 Gm/kG/Day (20.83 mL/Hr) IV Continuous <Continuous>  metoprolol tartrate Injectable 10 milliGRAM(s) IV Push every 4 hours  Parenteral Nutrition - Adult 1 Each TPN Continuous <Continuous>  ursodiol Suspension 300 milliGRAM(s) Oral every 8 hours  venlafaxine XR. 150 milliGRAM(s) Oral daily    MEDICATIONS  (PRN):  chlorhexidine 0.12% Liquid 15 milliLiter(s) Swish and Spit two times a day PRN oral hygeine  hydrALAZINE Injectable 10 milliGRAM(s) IV Push every 6 hours PRN SBP >170  LORazepam   Injectable 1 milliGRAM(s) IV Push <User Schedule> PRN Anxiety  ondansetron Injectable 4 milliGRAM(s) IV Push every 6 hours PRN Nausea and/or Vomiting      Vital Signs Last 24 Hrs  T(C): 36.4 (2024 05:30), Max: 37.1 (2024 06:21)  T(F): 97.5 (2024 05:30), Max: 98.7 (2024 06:21)  HR: 105 (2024 05:38) (91 - 107)  BP: 159/76 (2024 05:38) (121/64 - 168/83)  BP(mean): 109 (2024 05:38) (87 - 117)  RR: 21 (2024 05:38) (20 - 34)  SpO2: 98% (2024 05:38) (95% - 100%)    Parameters below as of 2024 05:38  Patient On (Oxygen Delivery Method): BiPAP/CPAP    O2 Concentration (%): 40    Physical Exam:  General: NAD, resting comfortably in bed  Eyes: Icterus   Pulmonary: Nonlabored breathing, no respiratory distress  Cardiovascular: NSR  Abdominal: soft, NT, ND, left ostomy with yellow liquid, right no blood, green output  Extremities: WWP, normal strength  Neuro: A/O x 3, CNs II-XII grossly intact,     I&O's Summary    2024 07:01  -  2024 07:00  --------------------------------------------------------  IN: 3095 mL / OUT: 4295 mL / NET: -1200 mL    2024 07:01  -  2024 06:18  --------------------------------------------------------  IN: 3492.4 mL / OUT: 3620 mL / NET: -127.6 mL        LABS:            Urinalysis Basic - ( 2024 19:53 )    Color: Dark Yellow / Appearance: Clear / S.018 / pH: x  Gluc: x / Ketone: Negative mg/dL  / Bili: Moderate / Urobili: 1.0 mg/dL   Blood: x / Protein: 30 mg/dL / Nitrite: Negative   Leuk Esterase: Small / RBC: 4 /HPF / WBC 2 /HPF   Sq Epi: x / Non Sq Epi: 0 /HPF / Bacteria: Negative /HPF      CAPILLARY BLOOD GLUCOSE            RADIOLOGY & ADDITIONAL STUDIES:   Overnight events: 700 @ 0300. Given 500cc bolus. Rates in 90s.       POD#  : Laparoscopic lysis of adhesions, converted to open lysis of adhesions, SBR x 3, temporary abdominal closure, 5L cryst, 1L 5% alb, 3 pRBC, 1 FFP, 1 plts  : ex lap, removal of abthera, ileocolic resection, small bowel anastamosis, , 1.6 crystalloid, 250 5%, 175 uop   7/3: IR Gendel drained perihepatic aspiration of serous fluid.   : RTOR exlap, washout, ileocolic resection with end ileostomy, blow hole colostomy, fistula, red rubber from ileostomy to small bowel anastomosis; vicryl bridging mesh; R JOYCE below ileostomy, L JOYCE at small bowel enterotomy repair; 500 LR, 500 5% albumin, 3u PRBC, 2 FFP, 400 UOP,   : s/p perc cony    SUBJECTIVE: Patient seen at bedside with chief resident. Patient denies any nausea or vomiting.       MEDICATIONS  (STANDING):  chlorhexidine 2% Cloths 1 Application(s) Topical <User Schedule>  cholestyramine Powder (Sugar-Free) 4 Gram(s) Oral daily  EPINEPHrine   1 mG/mL (1:1,000) Topical Solution 1 Application(s) Topical once  epoetin matt-epbx (RETACRIT) Injectable 63829 Unit(s) SubCutaneous every 7 days  gabapentin 100 milliGRAM(s) Oral at bedtime  glucagon  Injectable 1 milliGRAM(s) IntraMuscular once  heparin   Injectable 5000 Unit(s) SubCutaneous every 8 hours  levothyroxine Injectable 30 MICROGram(s) IV Push at bedtime  lipid, fat emulsion (Fish Oil and Plant Based) 20% Infusion 0.54 Gm/kG/Day (20.83 mL/Hr) IV Continuous <Continuous>  metoprolol tartrate Injectable 10 milliGRAM(s) IV Push every 4 hours  Parenteral Nutrition - Adult 1 Each TPN Continuous <Continuous>  ursodiol Suspension 300 milliGRAM(s) Oral every 8 hours  venlafaxine XR. 150 milliGRAM(s) Oral daily    MEDICATIONS  (PRN):  chlorhexidine 0.12% Liquid 15 milliLiter(s) Swish and Spit two times a day PRN oral hygeine  hydrALAZINE Injectable 10 milliGRAM(s) IV Push every 6 hours PRN SBP >170  LORazepam   Injectable 1 milliGRAM(s) IV Push <User Schedule> PRN Anxiety  ondansetron Injectable 4 milliGRAM(s) IV Push every 6 hours PRN Nausea and/or Vomiting      Vital Signs Last 24 Hrs  T(C): 36.4 (2024 05:30), Max: 37.1 (2024 06:21)  T(F): 97.5 (2024 05:30), Max: 98.7 (2024 06:21)  HR: 105 (2024 05:38) (91 - 107)  BP: 159/76 (2024 05:38) (121/64 - 168/83)  BP(mean): 109 (2024 05:38) (87 - 117)  RR: 21 (2024 05:38) (20 - 34)  SpO2: 98% (2024 05:38) (95% - 100%)    Parameters below as of 2024 05:38  Patient On (Oxygen Delivery Method): BiPAP/CPAP    O2 Concentration (%): 40    Physical Exam:  General: NAD, resting comfortably in bed  Eyes: Icterus   Pulmonary: Nonlabored breathing, no respiratory distress  Cardiovascular: NSR  Abdominal: soft, NT, ND, left ostomy with yellow liquid, right no blood, green output  Extremities: WWP, normal strength  Neuro: A/O x 3, CNs II-XII grossly intact,     I&O's Summary    2024 07:01  -  2024 07:00  --------------------------------------------------------  IN: 3095 mL / OUT: 4295 mL / NET: -1200 mL    2024 07:01  -  2024 06:18  --------------------------------------------------------  IN: 3492.4 mL / OUT: 3620 mL / NET: -127.6 mL        LABS:            Urinalysis Basic - ( 2024 19:53 )    Color: Dark Yellow / Appearance: Clear / S.018 / pH: x  Gluc: x / Ketone: Negative mg/dL  / Bili: Moderate / Urobili: 1.0 mg/dL   Blood: x / Protein: 30 mg/dL / Nitrite: Negative   Leuk Esterase: Small / RBC: 4 /HPF / WBC 2 /HPF   Sq Epi: x / Non Sq Epi: 0 /HPF / Bacteria: Negative /HPF      CAPILLARY BLOOD GLUCOSE            RADIOLOGY & ADDITIONAL STUDIES:

## 2024-01-09 NOTE — PROGRESS NOTE ADULT - SUBJECTIVE AND OBJECTIVE BOX
EPS Progress Note    S:     O: T(C): 36.4 (01-09-24 @ 10:00), Max: 36.9 (01-09-24 @ 01:01)  HR: 100 (01-09-24 @ 13:00) (74 - 107)  BP: 141/77 (01-09-24 @ 13:00) (121/64 - 178/86)  RR: 35 (01-09-24 @ 13:00) (18 - 36)  SpO2: 100% (01-09-24 @ 13:00) (93% - 100%)  Wt(kg): --    TELE:    PHYSICAL  General:  NAD        Chest:  CTA B/L, no w/r/r  Cardiac:  RRR, + s1/s2 , no m/g/r  Abdomen:   soft ND/NT  Extremities: No edema, b/l groin no hematoma/bleeding/oozing  Skin: no rash noted, normal color and pigmentation  Psych: A&Ox3, normal affect and mood  Neuro: no deficit noted     LABS:                        9.0    8.31  )-----------( 190      ( 09 Jan 2024 05:30 )             28.5     01-09    136  |  102  |  41<H>  ----------------------------<  130<H>  4.0   |  25  |  1.33<H>    Ca    8.6      09 Jan 2024 05:30  Phos  3.7     01-09  Mg     2.1     01-09    TPro  8.7<H>  /  Alb  2.9<L>  /  TBili  6.1<H>  /  DBili  4.0<H>  /  AST  99<H>  /  ALT  63<H>  /  AlkPhos  115  01-09          MEDICATIONS:  chlorhexidine 0.12% Liquid 15 milliLiter(s) Swish and Spit two times a day PRN  chlorhexidine 2% Cloths 1 Application(s) Topical <User Schedule>  cholestyramine Powder (Sugar-Free) 4 Gram(s) Oral daily  EPINEPHrine   1 mG/mL (1:1,000) Topical Solution 1 Application(s) Topical once  epoetin matt-epbx (RETACRIT) Injectable 29758 Unit(s) SubCutaneous every 7 days  gabapentin 100 milliGRAM(s) Oral at bedtime  glucagon  Injectable 1 milliGRAM(s) IntraMuscular once  heparin   Injectable 5000 Unit(s) SubCutaneous every 8 hours  hydrALAZINE Injectable 10 milliGRAM(s) IV Push every 6 hours  levothyroxine Injectable 30 MICROGram(s) IV Push at bedtime  lipid, fat emulsion (Fish Oil and Plant Based) 20% Infusion 0.54 Gm/kG/Day IV Continuous <Continuous>  LORazepam   Injectable 1 milliGRAM(s) IV Push <User Schedule> PRN  metoprolol tartrate Injectable 10 milliGRAM(s) IV Push every 4 hours  ondansetron Injectable 4 milliGRAM(s) IV Push every 6 hours PRN  Parenteral Nutrition - Adult 1 Each TPN Continuous <Continuous>  Parenteral Nutrition - Adult 1 Each TPN Continuous <Continuous>  ursodiol Suspension 300 milliGRAM(s) Oral every 8 hours  venlafaxine XR. 150 milliGRAM(s) Oral daily      ASSESSMENT/PLAN           EPS Progress Note    S:     O: T(C): 36.4 (01-09-24 @ 10:00), Max: 36.9 (01-09-24 @ 01:01)  HR: 100 (01-09-24 @ 13:00) (74 - 107)  BP: 141/77 (01-09-24 @ 13:00) (121/64 - 178/86)  RR: 35 (01-09-24 @ 13:00) (18 - 36)  SpO2: 100% (01-09-24 @ 13:00) (93% - 100%)  Wt(kg): --    TELE:    PHYSICAL  General:  NAD        Chest:  CTA B/L, no w/r/r  Cardiac:  RRR, + s1/s2 , no m/g/r  Abdomen:   soft ND/NT  Extremities: No edema, b/l groin no hematoma/bleeding/oozing  Skin: no rash noted, normal color and pigmentation  Psych: A&Ox3, normal affect and mood  Neuro: no deficit noted     LABS:                        9.0    8.31  )-----------( 190      ( 09 Jan 2024 05:30 )             28.5     01-09    136  |  102  |  41<H>  ----------------------------<  130<H>  4.0   |  25  |  1.33<H>    Ca    8.6      09 Jan 2024 05:30  Phos  3.7     01-09  Mg     2.1     01-09    TPro  8.7<H>  /  Alb  2.9<L>  /  TBili  6.1<H>  /  DBili  4.0<H>  /  AST  99<H>  /  ALT  63<H>  /  AlkPhos  115  01-09          MEDICATIONS:  chlorhexidine 0.12% Liquid 15 milliLiter(s) Swish and Spit two times a day PRN  chlorhexidine 2% Cloths 1 Application(s) Topical <User Schedule>  cholestyramine Powder (Sugar-Free) 4 Gram(s) Oral daily  EPINEPHrine   1 mG/mL (1:1,000) Topical Solution 1 Application(s) Topical once  epoetin matt-epbx (RETACRIT) Injectable 76865 Unit(s) SubCutaneous every 7 days  gabapentin 100 milliGRAM(s) Oral at bedtime  glucagon  Injectable 1 milliGRAM(s) IntraMuscular once  heparin   Injectable 5000 Unit(s) SubCutaneous every 8 hours  hydrALAZINE Injectable 10 milliGRAM(s) IV Push every 6 hours  levothyroxine Injectable 30 MICROGram(s) IV Push at bedtime  lipid, fat emulsion (Fish Oil and Plant Based) 20% Infusion 0.54 Gm/kG/Day IV Continuous <Continuous>  LORazepam   Injectable 1 milliGRAM(s) IV Push <User Schedule> PRN  metoprolol tartrate Injectable 10 milliGRAM(s) IV Push every 4 hours  ondansetron Injectable 4 milliGRAM(s) IV Push every 6 hours PRN  Parenteral Nutrition - Adult 1 Each TPN Continuous <Continuous>  Parenteral Nutrition - Adult 1 Each TPN Continuous <Continuous>  ursodiol Suspension 300 milliGRAM(s) Oral every 8 hours  venlafaxine XR. 150 milliGRAM(s) Oral daily      ASSESSMENT/PLAN           EPS Progress Note    S: EP reconsulted for the management of atrial arrhythmias.    Upon EKG review, pt likely been in AFL since early December, with rates well controlled persistently at 105bpm, and patient appears asymptomatic from this.    Despite persistent arrhythmia, recent ECHO with EF normal .      Has not been on anticoagulation due to concerns of superficial bleeding from the granulation tissue surrounding wound.      O: T(C): 36.4 (01-09-24 @ 10:00), Max: 36.9 (01-09-24 @ 01:01)  HR: 100 (01-09-24 @ 13:00) (74 - 107)  BP: 141/77 (01-09-24 @ 13:00) (121/64 - 178/86)  RR: 35 (01-09-24 @ 13:00) (18 - 36)  SpO2: 100% (01-09-24 @ 13:00) (93% - 100%)      PHYSICAL  General:  NAD        Chest:  CTA B/L, no w/r/r  Cardiac:  RRR, + s1/s2 , no m/g/r  Abdomen: (+) fistula   Extremities: No edema, b/l groin no hematoma/bleeding/oozing  Skin: no rash noted, normal color and pigmentation  Psych: A&Ox3, normal affect and mood  Neuro: no deficit noted     LABS:                        9.0    8.31  )-----------( 190      ( 09 Jan 2024 05:30 )             28.5     01-09    136  |  102  |  41<H>  ----------------------------<  130<H>  4.0   |  25  |  1.33<H>    Ca    8.6      09 Jan 2024 05:30  Phos  3.7     01-09  Mg     2.1     01-09    TPro  8.7<H>  /  Alb  2.9<L>  /  TBili  6.1<H>  /  DBili  4.0<H>  /  AST  99<H>  /  ALT  63<H>  /  AlkPhos  115  01-09          MEDICATIONS:  chlorhexidine 0.12% Liquid 15 milliLiter(s) Swish and Spit two times a day PRN  chlorhexidine 2% Cloths 1 Application(s) Topical <User Schedule>  cholestyramine Powder (Sugar-Free) 4 Gram(s) Oral daily  EPINEPHrine   1 mG/mL (1:1,000) Topical Solution 1 Application(s) Topical once  epoetin matt-epbx (RETACRIT) Injectable 92458 Unit(s) SubCutaneous every 7 days  gabapentin 100 milliGRAM(s) Oral at bedtime  glucagon  Injectable 1 milliGRAM(s) IntraMuscular once  heparin   Injectable 5000 Unit(s) SubCutaneous every 8 hours  hydrALAZINE Injectable 10 milliGRAM(s) IV Push every 6 hours  levothyroxine Injectable 30 MICROGram(s) IV Push at bedtime  lipid, fat emulsion (Fish Oil and Plant Based) 20% Infusion 0.54 Gm/kG/Day IV Continuous <Continuous>  LORazepam   Injectable 1 milliGRAM(s) IV Push <User Schedule> PRN  metoprolol tartrate Injectable 10 milliGRAM(s) IV Push every 4 hours  ondansetron Injectable 4 milliGRAM(s) IV Push every 6 hours PRN  Parenteral Nutrition - Adult 1 Each TPN Continuous <Continuous>  Parenteral Nutrition - Adult 1 Each TPN Continuous <Continuous>  ursodiol Suspension 300 milliGRAM(s) Oral every 8 hours  venlafaxine XR. 150 milliGRAM(s) Oral daily      ASSESSMENT/PLAN  78M with history of Crohn's disease, known AF/AFL s/p multiple DCCV in the past, previously on amiodarone, with a prolonged hospital stay following extensive abdominal surgery c/b fistula. EP now being reconsulted for the management of persistent atrial flutter. Pt has been in AFL since December, but asymptomatic and rates well controlled.    -Would continue metoprolol 10mg IV push q4hr, rates are WNL so no need for aggressive rate control agents at this time.    -In terms of anticoagulation, the patients CHADSVASC score is 2 and therefore he does have an indication for anticoagulation. Given that the patient is unable to take oral medication would recommend therapeutic lovenox.     -No role for DCCV at this time until patient proves able to tolerate persistent anticoagulation without major bleeding, especially given patient rates are controlled and he is asymptomatic.             EPS Progress Note    S: EP reconsulted for the management of atrial arrhythmias.    Upon EKG review, pt likely been in AFL since early December, with rates well controlled persistently at 105bpm, and patient appears asymptomatic from this.    Despite persistent arrhythmia, recent ECHO with EF normal .      Has not been on anticoagulation due to concerns of superficial bleeding from the granulation tissue surrounding wound.      O: T(C): 36.4 (01-09-24 @ 10:00), Max: 36.9 (01-09-24 @ 01:01)  HR: 100 (01-09-24 @ 13:00) (74 - 107)  BP: 141/77 (01-09-24 @ 13:00) (121/64 - 178/86)  RR: 35 (01-09-24 @ 13:00) (18 - 36)  SpO2: 100% (01-09-24 @ 13:00) (93% - 100%)      PHYSICAL  General:  NAD        Chest:  CTA B/L, no w/r/r  Cardiac:  RRR, + s1/s2 , no m/g/r  Abdomen: (+) fistula   Extremities: No edema, b/l groin no hematoma/bleeding/oozing  Skin: no rash noted, normal color and pigmentation  Psych: A&Ox3, normal affect and mood  Neuro: no deficit noted     LABS:                        9.0    8.31  )-----------( 190      ( 09 Jan 2024 05:30 )             28.5     01-09    136  |  102  |  41<H>  ----------------------------<  130<H>  4.0   |  25  |  1.33<H>    Ca    8.6      09 Jan 2024 05:30  Phos  3.7     01-09  Mg     2.1     01-09    TPro  8.7<H>  /  Alb  2.9<L>  /  TBili  6.1<H>  /  DBili  4.0<H>  /  AST  99<H>  /  ALT  63<H>  /  AlkPhos  115  01-09          MEDICATIONS:  chlorhexidine 0.12% Liquid 15 milliLiter(s) Swish and Spit two times a day PRN  chlorhexidine 2% Cloths 1 Application(s) Topical <User Schedule>  cholestyramine Powder (Sugar-Free) 4 Gram(s) Oral daily  EPINEPHrine   1 mG/mL (1:1,000) Topical Solution 1 Application(s) Topical once  epoetin matt-epbx (RETACRIT) Injectable 64090 Unit(s) SubCutaneous every 7 days  gabapentin 100 milliGRAM(s) Oral at bedtime  glucagon  Injectable 1 milliGRAM(s) IntraMuscular once  heparin   Injectable 5000 Unit(s) SubCutaneous every 8 hours  hydrALAZINE Injectable 10 milliGRAM(s) IV Push every 6 hours  levothyroxine Injectable 30 MICROGram(s) IV Push at bedtime  lipid, fat emulsion (Fish Oil and Plant Based) 20% Infusion 0.54 Gm/kG/Day IV Continuous <Continuous>  LORazepam   Injectable 1 milliGRAM(s) IV Push <User Schedule> PRN  metoprolol tartrate Injectable 10 milliGRAM(s) IV Push every 4 hours  ondansetron Injectable 4 milliGRAM(s) IV Push every 6 hours PRN  Parenteral Nutrition - Adult 1 Each TPN Continuous <Continuous>  Parenteral Nutrition - Adult 1 Each TPN Continuous <Continuous>  ursodiol Suspension 300 milliGRAM(s) Oral every 8 hours  venlafaxine XR. 150 milliGRAM(s) Oral daily      ASSESSMENT/PLAN  78M with history of Crohn's disease, known AF/AFL s/p multiple DCCV in the past, previously on amiodarone, with a prolonged hospital stay following extensive abdominal surgery c/b fistula. EP now being reconsulted for the management of persistent atrial flutter. Pt has been in AFL since December, but asymptomatic and rates well controlled.    -Would continue metoprolol 10mg IV push q4hr, rates are WNL so no need for aggressive rate control agents at this time.    -In terms of anticoagulation, the patients CHADSVASC score is 2 and therefore he does have an indication for anticoagulation. Given that the patient is unable to take oral medication would recommend therapeutic lovenox.     -No role for DCCV at this time until patient proves able to tolerate persistent anticoagulation without major bleeding, especially given patient rates are controlled and he is asymptomatic.

## 2024-01-09 NOTE — PROGRESS NOTE ADULT - NS ATTEND AMEND GEN_ALL_CORE FT
UC, AF s/p SBR with ileocolic resection, ileostomy, AF, enteroatmospheric fistula  physical as above  explore increase in TPN volume to 3 liters/day  EP f/u re persistent paroxysmal AF  continue metoprolol  LFTs stable  H/H stable

## 2024-01-09 NOTE — PROGRESS NOTE ADULT - SUBJECTIVE AND OBJECTIVE BOX
STATUS POST:    6/27: Laparoscopic lysis of adhesions, converted to open lysis of adhesions, SBR x 3, temporary abdominal closure, 5L cryst, 1L 5% alb, 3 pRBC, 1 FFP, 1 plts  6/28: ex lap, removal of abthera, ileocolic resection, small bowel anastamosis, , 1.6 crystalloid, 250 5%, 175 uop   7/3: IR Gendel drained perihepatic aspiration of serous fluid.   7/5: RTOR exlap, washout, ileocolic resection with end ileostomy, blow hole colostomy, fistula, red rubber from ileostomy to small bowel anastomosis; vicryl bridging mesh; R JOYCE below ileostomy, L JOYCE at small bowel enterotomy repair; 500 LR, 500 5% albumin, 3u PRBC, 2 FFP, 400 UOP,   9/11: s/p perc cony.    SUBJECTIVE: Pt seen and examined at bedside this am by surgery team. Patient is lying comfortably in bed with no complaints. Tolerating diet, pain well controlled with current regimen. Patient denies fever, nausea, vomiting, chest pain, and shortness of breath. Does not feel dehydrated or dry. Using BiPAP intermittently.      MEDICATIONS  (STANDING):  chlorhexidine 2% Cloths 1 Application(s) Topical <User Schedule>  cholestyramine Powder (Sugar-Free) 4 Gram(s) Oral daily  EPINEPHrine   1 mG/mL (1:1,000) Topical Solution 1 Application(s) Topical once  epoetin matt-epbx (RETACRIT) Injectable 10105 Unit(s) SubCutaneous every 7 days  gabapentin 100 milliGRAM(s) Oral at bedtime  glucagon  Injectable 1 milliGRAM(s) IntraMuscular once  heparin   Injectable 5000 Unit(s) SubCutaneous every 8 hours  levothyroxine Injectable 30 MICROGram(s) IV Push at bedtime  lipid, fat emulsion (Fish Oil and Plant Based) 20% Infusion 0.54 Gm/kG/Day (20.83 mL/Hr) IV Continuous <Continuous>  metoprolol tartrate Injectable 10 milliGRAM(s) IV Push every 4 hours  Parenteral Nutrition - Adult 1 Each TPN Continuous <Continuous>  ursodiol Suspension 300 milliGRAM(s) Oral every 8 hours  venlafaxine XR. 150 milliGRAM(s) Oral daily    MEDICATIONS  (PRN):  chlorhexidine 0.12% Liquid 15 milliLiter(s) Swish and Spit two times a day PRN oral hygeine  hydrALAZINE Injectable 10 milliGRAM(s) IV Push every 6 hours PRN SBP >170  LORazepam   Injectable 1 milliGRAM(s) IV Push <User Schedule> PRN Anxiety  ondansetron Injectable 4 milliGRAM(s) IV Push every 6 hours PRN Nausea and/or Vomiting      Vital Signs Last 24 Hrs  T(C): 36.4 (09 Jan 2024 05:30), Max: 36.9 (09 Jan 2024 01:01)  T(F): 97.5 (09 Jan 2024 05:30), Max: 98.4 (09 Jan 2024 01:01)  HR: 105 (09 Jan 2024 05:38) (91 - 107)  BP: 159/76 (09 Jan 2024 05:38) (121/64 - 168/83)  BP(mean): 109 (09 Jan 2024 05:38) (87 - 117)  RR: 21 (09 Jan 2024 05:38) (20 - 34)  SpO2: 98% (09 Jan 2024 05:38) (96% - 100%)    Parameters below as of 09 Jan 2024 05:38  Patient On (Oxygen Delivery Method): BiPAP/CPAP    O2 Concentration (%): 40    Physical Exam  General: Patient is doing well and lying in bed comfortably  Constitutional: alert and oriented   Pulm: Nonlabored breathing, no respiratory distress  CV: Regular rate and rhythm, normal sinus rhythm  Abd: soft, nontender, nondistended. No rebound, no guarding. Wound manager in place without leak this AM, dark brown/green output.  Extremities: warm, well perfused, no edema    I&O's Detail    08 Jan 2024 07:01  -  09 Jan 2024 07:00  --------------------------------------------------------  IN:    Fat Emulsion (Fish Oil &amp; Plant Based) 20% Infusion: 208 mL    IV PiggyBack: 1000 mL    Lactated Ringers Bolus: 500 mL    Oral Fluid: 600 mL    TPN (Total Parenteral Nutrition): 1640 mL  Total IN: 3948 mL    OUT:    Drain (mL): 0 mL    Drain (mL): 10 mL    Drain (mL): 2420 mL    Voided (mL): 1750 mL  Total OUT: 4180 mL    Total NET: -232 mL      09 Jan 2024 07:01  -  09 Jan 2024 08:58  --------------------------------------------------------  IN:    TPN (Total Parenteral Nutrition): 138 mL  Total IN: 138 mL    OUT:  Total OUT: 0 mL    Total NET: 138 mL        LABS:                        9.0    8.31  )-----------( 190      ( 09 Jan 2024 05:30 )             28.5     01-09    136  |  102  |  41<H>  ----------------------------<  130<H>  4.0   |  25  |  1.33<H>    Ca    8.6      09 Jan 2024 05:30  Phos  3.7     01-09  Mg     2.1     01-09    TPro  8.7<H>  /  Alb  2.9<L>  /  TBili  6.1<H>  /  DBili  4.0<H>  /  AST  99<H>  /  ALT  63<H>  /  AlkPhos  115  01-09      Urinalysis Basic - ( 09 Jan 2024 05:30 )    Color: x / Appearance: x / SG: x / pH: x  Gluc: 130 mg/dL / Ketone: x  / Bili: x / Urobili: x   Blood: x / Protein: x / Nitrite: x   Leuk Esterase: x / RBC: x / WBC x   Sq Epi: x / Non Sq Epi: x / Bacteria: x     STATUS POST:    6/27: Laparoscopic lysis of adhesions, converted to open lysis of adhesions, SBR x 3, temporary abdominal closure, 5L cryst, 1L 5% alb, 3 pRBC, 1 FFP, 1 plts  6/28: ex lap, removal of abthera, ileocolic resection, small bowel anastamosis, , 1.6 crystalloid, 250 5%, 175 uop   7/3: IR Gendel drained perihepatic aspiration of serous fluid.   7/5: RTOR exlap, washout, ileocolic resection with end ileostomy, blow hole colostomy, fistula, red rubber from ileostomy to small bowel anastomosis; vicryl bridging mesh; R JOYCE below ileostomy, L JOYCE at small bowel enterotomy repair; 500 LR, 500 5% albumin, 3u PRBC, 2 FFP, 400 UOP,   9/11: s/p perc cony.    SUBJECTIVE: Pt seen and examined at bedside this am by surgery team. Patient is lying comfortably in bed with no complaints. Tolerating diet, pain well controlled with current regimen. Patient denies fever, nausea, vomiting, chest pain, and shortness of breath. Does not feel dehydrated or dry. Using BiPAP intermittently.      MEDICATIONS  (STANDING):  chlorhexidine 2% Cloths 1 Application(s) Topical <User Schedule>  cholestyramine Powder (Sugar-Free) 4 Gram(s) Oral daily  EPINEPHrine   1 mG/mL (1:1,000) Topical Solution 1 Application(s) Topical once  epoetin matt-epbx (RETACRIT) Injectable 95309 Unit(s) SubCutaneous every 7 days  gabapentin 100 milliGRAM(s) Oral at bedtime  glucagon  Injectable 1 milliGRAM(s) IntraMuscular once  heparin   Injectable 5000 Unit(s) SubCutaneous every 8 hours  levothyroxine Injectable 30 MICROGram(s) IV Push at bedtime  lipid, fat emulsion (Fish Oil and Plant Based) 20% Infusion 0.54 Gm/kG/Day (20.83 mL/Hr) IV Continuous <Continuous>  metoprolol tartrate Injectable 10 milliGRAM(s) IV Push every 4 hours  Parenteral Nutrition - Adult 1 Each TPN Continuous <Continuous>  ursodiol Suspension 300 milliGRAM(s) Oral every 8 hours  venlafaxine XR. 150 milliGRAM(s) Oral daily    MEDICATIONS  (PRN):  chlorhexidine 0.12% Liquid 15 milliLiter(s) Swish and Spit two times a day PRN oral hygeine  hydrALAZINE Injectable 10 milliGRAM(s) IV Push every 6 hours PRN SBP >170  LORazepam   Injectable 1 milliGRAM(s) IV Push <User Schedule> PRN Anxiety  ondansetron Injectable 4 milliGRAM(s) IV Push every 6 hours PRN Nausea and/or Vomiting      Vital Signs Last 24 Hrs  T(C): 36.4 (09 Jan 2024 05:30), Max: 36.9 (09 Jan 2024 01:01)  T(F): 97.5 (09 Jan 2024 05:30), Max: 98.4 (09 Jan 2024 01:01)  HR: 105 (09 Jan 2024 05:38) (91 - 107)  BP: 159/76 (09 Jan 2024 05:38) (121/64 - 168/83)  BP(mean): 109 (09 Jan 2024 05:38) (87 - 117)  RR: 21 (09 Jan 2024 05:38) (20 - 34)  SpO2: 98% (09 Jan 2024 05:38) (96% - 100%)    Parameters below as of 09 Jan 2024 05:38  Patient On (Oxygen Delivery Method): BiPAP/CPAP    O2 Concentration (%): 40    Physical Exam  General: Patient is doing well and lying in bed comfortably  Constitutional: alert and oriented   Pulm: Nonlabored breathing, no respiratory distress  CV: Regular rate and rhythm, normal sinus rhythm  Abd: soft, nontender, nondistended. No rebound, no guarding. Wound manager in place without leak this AM, dark brown/green output.  Extremities: warm, well perfused, no edema    I&O's Detail    08 Jan 2024 07:01  -  09 Jan 2024 07:00  --------------------------------------------------------  IN:    Fat Emulsion (Fish Oil &amp; Plant Based) 20% Infusion: 208 mL    IV PiggyBack: 1000 mL    Lactated Ringers Bolus: 500 mL    Oral Fluid: 600 mL    TPN (Total Parenteral Nutrition): 1640 mL  Total IN: 3948 mL    OUT:    Drain (mL): 0 mL    Drain (mL): 10 mL    Drain (mL): 2420 mL    Voided (mL): 1750 mL  Total OUT: 4180 mL    Total NET: -232 mL      09 Jan 2024 07:01  -  09 Jan 2024 08:58  --------------------------------------------------------  IN:    TPN (Total Parenteral Nutrition): 138 mL  Total IN: 138 mL    OUT:  Total OUT: 0 mL    Total NET: 138 mL        LABS:                        9.0    8.31  )-----------( 190      ( 09 Jan 2024 05:30 )             28.5     01-09    136  |  102  |  41<H>  ----------------------------<  130<H>  4.0   |  25  |  1.33<H>    Ca    8.6      09 Jan 2024 05:30  Phos  3.7     01-09  Mg     2.1     01-09    TPro  8.7<H>  /  Alb  2.9<L>  /  TBili  6.1<H>  /  DBili  4.0<H>  /  AST  99<H>  /  ALT  63<H>  /  AlkPhos  115  01-09      Urinalysis Basic - ( 09 Jan 2024 05:30 )    Color: x / Appearance: x / SG: x / pH: x  Gluc: 130 mg/dL / Ketone: x  / Bili: x / Urobili: x   Blood: x / Protein: x / Nitrite: x   Leuk Esterase: x / RBC: x / WBC x   Sq Epi: x / Non Sq Epi: x / Bacteria: x

## 2024-01-09 NOTE — PROGRESS NOTE ADULT - SUBJECTIVE AND OBJECTIVE BOX
Interval Events:  I/O -700 @ 0300. Given 500cc bolus.     Patient seen and examined at bedside.      Allergies    penicillin (Angioedema)    Intolerances        Vital Signs Last 24 Hrs  T(C): 36.4 (09 Jan 2024 10:00), Max: 36.9 (09 Jan 2024 01:01)  T(F): 97.6 (09 Jan 2024 10:00), Max: 98.4 (09 Jan 2024 01:01)  HR: 95 (09 Jan 2024 12:00) (74 - 107)  BP: 178/86 (09 Jan 2024 10:00) (121/64 - 178/86)  BP(mean): 123 (09 Jan 2024 10:00) (87 - 123)  RR: 29 (09 Jan 2024 12:00) (18 - 36)  SpO2: 99% (09 Jan 2024 12:00) (93% - 100%)    Parameters below as of 09 Jan 2024 09:00  Patient On (Oxygen Delivery Method): room air        01-08 @ 07:01  -  01-09 @ 07:00  --------------------------------------------------------  IN: 3948 mL / OUT: 4180 mL / NET: -232 mL    01-09 @ 07:01 - 01-09 @ 13:28  --------------------------------------------------------  IN: 276 mL / OUT: 475 mL / NET: -199 mL      01-08 @ 07:01 - 01-09 @ 07:00  --------------------------------------------------------  IN: 3948 mL / OUT: 4180 mL / NET: -232 mL    01-09 @ 07:01  -  01-09 @ 13:28  --------------------------------------------------------  IN: 276 mL / OUT: 475 mL / NET: -199 mL        Physical Exam:   Gen: NAD  Neurological: AAOx3, CNII-XII intact,  strength 5/5 b/l  ENT: mucus membrane moist, icterus  Cardiovascular: RRR  Respiratory: CTA  Gastrointestinal: soft, NT, ND, BS+, fistula with greenish light brownish output, ostomy on right side also light green/brownish output   Extremities: warm, no dependent edema  Vascular: no cyanosis/erythema  Skin: no rashes, Jaundice.   MSK: no joint swelling.         LABS:      CBC Full  -  ( 09 Jan 2024 05:30 )  WBC Count : 8.31 K/uL  RBC Count : 2.90 M/uL  Hemoglobin : 9.0 g/dL  Hematocrit : 28.5 %  Platelet Count - Automated : 190 K/uL  Mean Cell Volume : 98.3 fl  Mean Cell Hemoglobin : 31.0 pg  Mean Cell Hemoglobin Concentration : 31.6 gm/dL  Auto Neutrophil # : x  Auto Lymphocyte # : x  Auto Monocyte # : x  Auto Eosinophil # : x  Auto Basophil # : x  Auto Neutrophil % : x  Auto Lymphocyte % : x  Auto Monocyte % : x  Auto Eosinophil % : x  Auto Basophil % : x    01-09    136  |  102  |  41<H>  ----------------------------<  130<H>  4.0   |  25  |  1.33<H>    Ca    8.6      09 Jan 2024 05:30  Phos  3.7     01-09  Mg     2.1     01-09    TPro  8.7<H>  /  Alb  2.9<L>  /  TBili  6.1<H>  /  DBili  4.0<H>  /  AST  99<H>  /  ALT  63<H>  /  AlkPhos  115  01-09          Urinalysis Basic - ( 09 Jan 2024 05:30 )    Color: x / Appearance: x / SG: x / pH: x  Gluc: 130 mg/dL / Ketone: x  / Bili: x / Urobili: x   Blood: x / Protein: x / Nitrite: x   Leuk Esterase: x / RBC: x / WBC x   Sq Epi: x / Non Sq Epi: x / Bacteria: x              RADIOLOGY & ADDITIONAL STUDIES (The following images were personally reviewed):          A/p: 78M w PMH Crohn's, AFib/Flutter s/p DCCVs on amiodarone, remote ileocectomy and open appendectomy. Admitted (6/23) for SBO vs Crohns flare, s/p NGT decompression and s/p lap converted to open TRE, SBR x 3, left in discontinuity with abthera vac on (6/27), RTOR for ileocolic resection, small bowel anastomosis, and abdominal wall closure on (6/28), c/b fluid collection s/p IR aspiration of perihepatic fluid on (7/3), c/b wound dehiscence s/p RTOR exlap, washout, ileocolic resection with end ileostomy, blow hole colostomy, red rubber from ileostomy to small bowel anastomosis; vicryl bridging mesh on (7/5) transferred to SICU postoperatively for hemodynamic monitoring, with hospital course complicated by periods of severe ELVI and hyponatremia, which resolved but stepped back up for to SICU on (9/10) for acute AMS, intermittent hypoglycemia, AFib with RVR. Percutaneous Cholecystostomy placed on (9/11) for enlarged GB, PCT capped 10/5 and uncapped 10/25 for hyperbilirubinemia, Right brachial DVT, left basillic and cephalic superficial thrombus on duplex 11/2, CT 11/14 with ALDEN ground glass opacity of unclear etiology, completed empiric 7day cefepime course 11/22, rising T-bili of unclear etilogy, now w resolved candida glabrata fungemia, ELVI improving.     NEURO: Hx of depression: cont Effexor (halved dose in setting of renal dysfunx). Anxiety: Lorazepam PRN. Holistic consult for anxiety and insomnia. Gabapentin for restless leg syndrome.   CV: Sinus tachycardia with Aflutter intermittently - F/u EP consulted today to decide rate controlling regimen along with AC. Cont Metoprolol 10mgIV q4hr rate control. Hx Afib/flutter: s/p DCCV, off Amio and lipitor due to persistent transaminitis. TTE  (7/18) - PASP 64mmHg, EF 65-70%. holding Losartan & norvasc because not absorbing PO meds, hydralazine PRN. TTE (1/4) LVEF 75%, mild/mod AR, PASP 40, RVEF wnl. POCUS 1/4/23 bilateral effusions. Will follow up bilateral ultrasound.   PULM: Atelectasis/dyspnea improved clinically: cont 1 hr of BiPAP every 12hrs of nasal canula or room air. Encourage OOB and IS.  CT from 11/14 with ALDEN ground glass opacity of unclear etiology. COVID negative. RVP negative. completed 7d empiric cefepime 11/22 to cover for potential PNA.   GI/FEN: Low res, low fat, high protein diet. Cont Ensure max x1/day. Folate, thiamine. Continue TPN/lipids will add 1 L to TPN bag. High output EC fistula: cont Octreotide & Cholestyramine . Transaminitis elevated T bili of unclear origin, s/p Perc Deb on 9/11, failed capping trial. Repeat capping trial 1/2, no elevation in Tbili to date; seen by Hepatology - MRCP 10/30 no obstruction, cont ursodiol, RUQ US 11/25 CBD 5mm, hepatic vessels patent  : Voids. ELVI improved: stopped famotidine given renal dysfunction. MARLO Duplex and RP US unremarkable, CK wnl.    ENDO: Hx hypothyroid: IV Synthroid, TSH low on 11/30, decreased synthroid dose, repeat TSH 12/6 wnl. Repeat thryoid studies WNL 1/3/24.   ID: currently not on any abx/antifungals. BCx 11/22 grew candida glabrata, likely CLABSI. s/p Caspo & completed Fluconazole course (12/1-12/9). Ophthalmology evaluated - normal ocular exam. Echo no vegetation. PICC replaced on 12/4. Cont Nystatin powder. // DC: caspofungin (11/24-12/1). empiric 7days cefepime (11/15-11/22), C. tertium, Lactobacillis from IR cx 7/3, and candida albicans, lactobacillus, vanc sensitive E faecium, vanc resistant E gallinarum, vanc resistant E casseliflavus, lactobacillus from OR cx 7/5. Completed course of abx with Imipenem (9/10-9/15). Imipenem (8/26--) imipenem (6/30-7/12, 7/23-7/30), Dapto (6/30-7/5 and 7/23-7/24). Empiric dapto (8/23-24) and cefepime (8/23-24).   PPX: SCDs, SQH ppx was on Therapeutic lovenox bid for R brachial vein DVT, but will hold off on anticoagulation since repeat US Duplex negative.  HEME: Anemia - continue Epogen weekly. S/p Iron Sucrose 200 qd x 3 days for chronic anemia.   LINES: PIVs, PICC line( 1/5-) // DC: LUE PICC (9/1-11/1 ), RUE PICC: (11/1-11/24) LUE PICC (12/4 -   WOUNDS/DRAINS: Abdominal wound and fistula with wound manager in place dressing change Tuesday and Friday, had punctate bleeding at wound bed, s/p placed surgicel, attempted to stitch, electrocaudery. Bleeding stopped after applying epi soaked gauze - not bleeding at this time. RLQ Ostomy. IR perc deb tube. // DC: L Clay drain.  PT: Ambulate as tolerated OOB to chair daily.  DISPO: SICU due to complicated hospital course. Will Start preparing pt for possible Discharge home

## 2024-01-09 NOTE — PROGRESS NOTE ADULT - NS ATTEND AMEND GEN_ALL_CORE FT
paTIENT IN PERSISTENT ATRIAL FLUTTER (ATYPICAL) . Ideally Needs anticoagulation If he cannot take po will continue rate control strategy Lovenox can be outpatient anticoagulaiton strategy although benefits should be weighted against risk for bleeding. Timolol ophalmic solution might be consider for bblockade.

## 2024-01-10 NOTE — PROGRESS NOTE ADULT - SUBJECTIVE AND OBJECTIVE BOX
STATUS POST:    6/27: Laparoscopic lysis of adhesions, converted to open lysis of adhesions, SBR x 3, temporary abdominal closure, 5L cryst, 1L 5% alb, 3 pRBC, 1 FFP, 1 plts  6/28: ex lap, removal of abthera, ileocolic resection, small bowel anastamosis, , 1.6 crystalloid, 250 5%, 175 uop   7/3: IR Gendel drained perihepatic aspiration of serous fluid.   7/5: RTOR exlap, washout, ileocolic resection with end ileostomy, blow hole colostomy, fistula, red rubber from ileostomy to small bowel anastomosis; vicryl bridging mesh; R JOYCE below ileostomy, L JOYCE at small bowel enterotomy repair; 500 LR, 500 5% albumin, 3u PRBC, 2 FFP, 400 UOP,   9/11: s/p perc cony.    SUBJECTIVE: Pt seen and examined at bedside this am by surgery team. Patient is lying comfortably in bed with no complaints. Tolerating diet, pain well controlled with current regimen. Patient denies fever, nausea, vomiting, chest pain, and shortness of breath. Does not feel dehydrated or dry. Using BiPAP intermittently.      MEDICATIONS  (STANDING):  chlorhexidine 2% Cloths 1 Application(s) Topical <User Schedule>  cholestyramine Powder (Sugar-Free) 4 Gram(s) Oral daily  enoxaparin Injectable 90 milliGRAM(s) SubCutaneous every 12 hours  EPINEPHrine   1 mG/mL (1:1,000) Topical Solution 1 Application(s) Topical once  epoetin matt-epbx (RETACRIT) Injectable 58767 Unit(s) SubCutaneous every 7 days  ergocalciferol 60984 Unit(s) Oral <User Schedule>  gabapentin 100 milliGRAM(s) Oral at bedtime  glucagon  Injectable 1 milliGRAM(s) IntraMuscular once  hydrALAZINE Injectable 10 milliGRAM(s) IV Push every 6 hours  levothyroxine Injectable 30 MICROGram(s) IV Push at bedtime  lipid, fat emulsion (Fish Oil and Plant Based) 20% Infusion 0.54 Gm/kG/Day (20.83 mL/Hr) IV Continuous <Continuous>  metoprolol tartrate Injectable 10 milliGRAM(s) IV Push every 6 hours  Parenteral Nutrition - Adult 1 Each TPN Continuous <Continuous>  Parenteral Nutrition - Adult 1 Each TPN Continuous <Continuous>  timolol 0.5% Solution 1 Drop(s) Both EYES daily  ursodiol Suspension 300 milliGRAM(s) Oral every 8 hours  venlafaxine XR. 150 milliGRAM(s) Oral daily    MEDICATIONS  (PRN):  chlorhexidine 0.12% Liquid 15 milliLiter(s) Swish and Spit two times a day PRN oral hygeine  LORazepam   Injectable 1 milliGRAM(s) IV Push <User Schedule> PRN Anxiety  ondansetron Injectable 4 milliGRAM(s) IV Push every 6 hours PRN Nausea and/or Vomiting      Vital Signs Last 24 Hrs  T(C): 36.4 (10 Sukhdev 2024 10:00), Max: 36.9 (10 Sukhdev 2024 01:09)  T(F): 97.6 (10 Sukhdev 2024 10:00), Max: 98.4 (10 Sukhdev 2024 01:09)  HR: 103 (10 Sukhdev 2024 18:00) (103 - 108)  BP: 130/61 (10 Sukhdev 2024 18:00) (115/57 - 155/72)  BP(mean): 88 (10 Sukhdev 2024 18:00) (79 - 103)  RR: 24 (10 Sukhdev 2024 18:00) (12 - 24)  SpO2: 95% (10 Sukhdev 2024 18:00) (93% - 100%)    Parameters below as of 10 Sukhdev 2024 18:00  Patient On (Oxygen Delivery Method): room air        Physical Exam  General: Patient is doing well and lying in bed comfortably  Constitutional: alert and oriented   Pulm: Nonlabored breathing, no respiratory distress  CV: Regular rate and rhythm, normal sinus rhythm  Abd: soft, nontender, nondistended. No rebound, no guarding. Wound manager in place without leak this AM, dark brown/green output.  Extremities: warm, well perfused, no edema    I&O's Detail    09 Jan 2024 07:01  -  10 Sukhdev 2024 07:00  --------------------------------------------------------  IN:    Fat Emulsion (Fish Oil &amp; Plant Based) 20% Infusion: 291.2 mL    TPN (Total Parenteral Nutrition): 3091 mL  Total IN: 3382.2 mL    OUT:    Drain (mL): 1555 mL    Voided (mL): 1480 mL  Total OUT: 3035 mL    Total NET: 347.2 mL      10 Sukhdev 2024 07:01  -  10 Sukhdev 2024 21:09  --------------------------------------------------------  IN:    Fat Emulsion (Fish Oil &amp; Plant Based) 20% Infusion: 83.2 mL    Oral Fluid: 1440 mL    TPN (Total Parenteral Nutrition): 1072 mL  Total IN: 2595.2 mL    OUT:    Drain (mL): 1050 mL    Fat Emulsion (Fish Oil &amp; Plant Based) 20% Infusion: 0 mL    Voided (mL): 625 mL  Total OUT: 1675 mL    Total NET: 920.2 mL        LABS:                        9.0    8.31  )-----------( 190      ( 09 Jan 2024 05:30 )             28.5     01-09    136  |  102  |  41<H>  ----------------------------<  130<H>  4.0   |  25  |  1.33<H>    Ca    8.6      09 Jan 2024 05:30  Phos  3.7     01-09  Mg     2.1     01-09    TPro  8.7<H>  /  Alb  2.9<L>  /  TBili  6.1<H>  /  DBili  4.0<H>  /  AST  99<H>  /  ALT  63<H>  /  AlkPhos  115  01-09      Urinalysis Basic - ( 09 Jan 2024 05:30 )    Color: x / Appearance: x / SG: x / pH: x  Gluc: 130 mg/dL / Ketone: x  / Bili: x / Urobili: x   Blood: x / Protein: x / Nitrite: x   Leuk Esterase: x / RBC: x / WBC x   Sq Epi: x / Non Sq Epi: x / Bacteria: x     STATUS POST:    6/27: Laparoscopic lysis of adhesions, converted to open lysis of adhesions, SBR x 3, temporary abdominal closure, 5L cryst, 1L 5% alb, 3 pRBC, 1 FFP, 1 plts  6/28: ex lap, removal of abthera, ileocolic resection, small bowel anastamosis, , 1.6 crystalloid, 250 5%, 175 uop   7/3: IR Gendel drained perihepatic aspiration of serous fluid.   7/5: RTOR exlap, washout, ileocolic resection with end ileostomy, blow hole colostomy, fistula, red rubber from ileostomy to small bowel anastomosis; vicryl bridging mesh; R JOYCE below ileostomy, L JOYCE at small bowel enterotomy repair; 500 LR, 500 5% albumin, 3u PRBC, 2 FFP, 400 UOP,   9/11: s/p perc cony.    SUBJECTIVE: Pt seen and examined at bedside this am by surgery team. Patient is lying comfortably in bed with no complaints. Tolerating diet, pain well controlled with current regimen. Patient denies fever, nausea, vomiting, chest pain, and shortness of breath. Does not feel dehydrated or dry. Using BiPAP intermittently.      MEDICATIONS  (STANDING):  chlorhexidine 2% Cloths 1 Application(s) Topical <User Schedule>  cholestyramine Powder (Sugar-Free) 4 Gram(s) Oral daily  enoxaparin Injectable 90 milliGRAM(s) SubCutaneous every 12 hours  EPINEPHrine   1 mG/mL (1:1,000) Topical Solution 1 Application(s) Topical once  epoetin matt-epbx (RETACRIT) Injectable 85571 Unit(s) SubCutaneous every 7 days  ergocalciferol 34838 Unit(s) Oral <User Schedule>  gabapentin 100 milliGRAM(s) Oral at bedtime  glucagon  Injectable 1 milliGRAM(s) IntraMuscular once  hydrALAZINE Injectable 10 milliGRAM(s) IV Push every 6 hours  levothyroxine Injectable 30 MICROGram(s) IV Push at bedtime  lipid, fat emulsion (Fish Oil and Plant Based) 20% Infusion 0.54 Gm/kG/Day (20.83 mL/Hr) IV Continuous <Continuous>  metoprolol tartrate Injectable 10 milliGRAM(s) IV Push every 6 hours  Parenteral Nutrition - Adult 1 Each TPN Continuous <Continuous>  Parenteral Nutrition - Adult 1 Each TPN Continuous <Continuous>  timolol 0.5% Solution 1 Drop(s) Both EYES daily  ursodiol Suspension 300 milliGRAM(s) Oral every 8 hours  venlafaxine XR. 150 milliGRAM(s) Oral daily    MEDICATIONS  (PRN):  chlorhexidine 0.12% Liquid 15 milliLiter(s) Swish and Spit two times a day PRN oral hygeine  LORazepam   Injectable 1 milliGRAM(s) IV Push <User Schedule> PRN Anxiety  ondansetron Injectable 4 milliGRAM(s) IV Push every 6 hours PRN Nausea and/or Vomiting      Vital Signs Last 24 Hrs  T(C): 36.4 (10 Sukhdev 2024 10:00), Max: 36.9 (10 Sukhdev 2024 01:09)  T(F): 97.6 (10 Sukhdev 2024 10:00), Max: 98.4 (10 Sukhdev 2024 01:09)  HR: 103 (10 Sukhdev 2024 18:00) (103 - 108)  BP: 130/61 (10 Sukhdev 2024 18:00) (115/57 - 155/72)  BP(mean): 88 (10 Sukhdev 2024 18:00) (79 - 103)  RR: 24 (10 Sukhdev 2024 18:00) (12 - 24)  SpO2: 95% (10 Sukhdev 2024 18:00) (93% - 100%)    Parameters below as of 10 Sukhdev 2024 18:00  Patient On (Oxygen Delivery Method): room air        Physical Exam  General: Patient is doing well and lying in bed comfortably  Constitutional: alert and oriented   Pulm: Nonlabored breathing, no respiratory distress  CV: Regular rate and rhythm, normal sinus rhythm  Abd: soft, nontender, nondistended. No rebound, no guarding. Wound manager in place without leak this AM, dark brown/green output.  Extremities: warm, well perfused, no edema    I&O's Detail    09 Jan 2024 07:01  -  10 Sukhdev 2024 07:00  --------------------------------------------------------  IN:    Fat Emulsion (Fish Oil &amp; Plant Based) 20% Infusion: 291.2 mL    TPN (Total Parenteral Nutrition): 3091 mL  Total IN: 3382.2 mL    OUT:    Drain (mL): 1555 mL    Voided (mL): 1480 mL  Total OUT: 3035 mL    Total NET: 347.2 mL      10 Sukhdev 2024 07:01  -  10 Sukhdev 2024 21:09  --------------------------------------------------------  IN:    Fat Emulsion (Fish Oil &amp; Plant Based) 20% Infusion: 83.2 mL    Oral Fluid: 1440 mL    TPN (Total Parenteral Nutrition): 1072 mL  Total IN: 2595.2 mL    OUT:    Drain (mL): 1050 mL    Fat Emulsion (Fish Oil &amp; Plant Based) 20% Infusion: 0 mL    Voided (mL): 625 mL  Total OUT: 1675 mL    Total NET: 920.2 mL        LABS:                        9.0    8.31  )-----------( 190      ( 09 Jan 2024 05:30 )             28.5     01-09    136  |  102  |  41<H>  ----------------------------<  130<H>  4.0   |  25  |  1.33<H>    Ca    8.6      09 Jan 2024 05:30  Phos  3.7     01-09  Mg     2.1     01-09    TPro  8.7<H>  /  Alb  2.9<L>  /  TBili  6.1<H>  /  DBili  4.0<H>  /  AST  99<H>  /  ALT  63<H>  /  AlkPhos  115  01-09      Urinalysis Basic - ( 09 Jan 2024 05:30 )    Color: x / Appearance: x / SG: x / pH: x  Gluc: 130 mg/dL / Ketone: x  / Bili: x / Urobili: x   Blood: x / Protein: x / Nitrite: x   Leuk Esterase: x / RBC: x / WBC x   Sq Epi: x / Non Sq Epi: x / Bacteria: x

## 2024-01-10 NOTE — PROGRESS NOTE ADULT - NS ATTEND AMEND GEN_ALL_CORE FT
Continue Timolol and IV push metoprolol for rate control. If ophmalmic solution proven effective might wean off IV metoprolol

## 2024-01-10 NOTE — PROGRESS NOTE ADULT - SUBJECTIVE AND OBJECTIVE BOX
Interval Events:  Patient seen and examined at bedside.      Allergies    penicillin (Angioedema)    Intolerances        Vital Signs Last 24 Hrs  T(C): 36.4 (10 Sukhdev 2024 05:15), Max: 36.9 (10 Sukhdev 2024 01:09)  T(F): 97.6 (10 Sukhdev 2024 05:15), Max: 98.4 (10 Sukhdev 2024 01:09)  HR: 107 (10 Sukhdev 2024 05:52) (74 - 107)  BP: 143/67 (10 Sukhdev 2024 02:15) (123/58 - 178/86)  BP(mean): 96 (10 Sukhdev 2024 02:15) (83 - 123)  RR: 18 (10 Sukhdev 2024 05:52) (18 - 36)  SpO2: 93% (10 Sukhdev 2024 05:52) (93% - 100%)    Parameters below as of 10 Sukhdev 2024 05:52  Patient On (Oxygen Delivery Method): room air        01-08 @ 07:01  -  01-09 @ 07:00  --------------------------------------------------------  IN: 3948 mL / OUT: 4180 mL / NET: -232 mL    01-09 @ 07:01  -  01-10 @ 06:36  --------------------------------------------------------  IN: 3403 mL / OUT: 2895 mL / NET: 508 mL      01-08 @ 07:01  -  01-09 @ 07:00  --------------------------------------------------------  IN: 3948 mL / OUT: 4180 mL / NET: -232 mL    01-09 @ 07:01 - 01-10 @ 06:36  --------------------------------------------------------  IN: 3403 mL / OUT: 2895 mL / NET: 508 mL        Physical Exam:     Gen: NAD  Neurological: AAOx3, CNII-XII intact,  strength 5/5 b/l  ENT: mucus membrane moist, icteric  Cardiovascular: RRR  Respiratory: CTA  Gastrointestinal: soft, NT, ND, BS+, fistula with greenish light brown output, ostomy on right side also light green/brownish liquid output   Extremities: warm, no dependent edema  Vascular: no cyanosis/erythema  Skin: no rashes, Jaundice.   MSK: no joint swelling.       LABS:      CBC Full  -  ( 09 Jan 2024 05:30 )  WBC Count : 8.31 K/uL  RBC Count : 2.90 M/uL  Hemoglobin : 9.0 g/dL  Hematocrit : 28.5 %  Platelet Count - Automated : 190 K/uL  Mean Cell Volume : 98.3 fl  Mean Cell Hemoglobin : 31.0 pg  Mean Cell Hemoglobin Concentration : 31.6 gm/dL  Auto Neutrophil # : x  Auto Lymphocyte # : x  Auto Monocyte # : x  Auto Eosinophil # : x  Auto Basophil # : x  Auto Neutrophil % : x  Auto Lymphocyte % : x  Auto Monocyte % : x  Auto Eosinophil % : x  Auto Basophil % : x    01-09    136  |  102  |  41<H>  ----------------------------<  130<H>  4.0   |  25  |  1.33<H>    Ca    8.6      09 Jan 2024 05:30  Phos  3.7     01-09  Mg     2.1     01-09    TPro  8.7<H>  /  Alb  2.9<L>  /  TBili  6.1<H>  /  DBili  4.0<H>  /  AST  99<H>  /  ALT  63<H>  /  AlkPhos  115  01-09          Urinalysis Basic - ( 09 Jan 2024 05:30 )    Color: x / Appearance: x / SG: x / pH: x  Gluc: 130 mg/dL / Ketone: x  / Bili: x / Urobili: x   Blood: x / Protein: x / Nitrite: x   Leuk Esterase: x / RBC: x / WBC x   Sq Epi: x / Non Sq Epi: x / Bacteria: x              RADIOLOGY & ADDITIONAL STUDIES (The following images were personally reviewed):          A/p:78M w PMH Crohn's, AFib/Flutter s/p DCCVs on amiodarone, remote ileocectomy and open appendectomy. Admitted (6/23) for SBO vs Crohns flare, s/p NGT decompression and s/p lap converted to open TRE, SBR x 3, left in discontinuity with abthera vac on (6/27), RTOR for ileocolic resection, small bowel anastomosis, and abdominal wall closure on (6/28), c/b fluid collection s/p IR aspiration of perihepatic fluid on (7/3), c/b wound dehiscence s/p RTOR exlap, washout, ileocolic resection with end ileostomy, blow hole colostomy, red rubber from ileostomy to small bowel anastomosis; vicryl bridging mesh on (7/5) transferred to SICU postoperatively for hemodynamic monitoring, with hospital course complicated by periods of severe ELVI and hyponatremia, which resolved but stepped back up for to SICU on (9/10) for acute AMS, intermittent hypoglycemia, AFib with RVR. Percutaneous Cholecystostomy placed on (9/11) for enlarged GB, PCT capped 10/5 and uncapped 10/25 for hyperbilirubinemia, Right brachial DVT, left basillic and cephalic superficial thrombus on duplex 11/2, CT 11/14 with ALDEN ground glass opacity of unclear etiology, completed empiric 7day cefepime course 11/22, rising T-bili of unclear etilogy, now w resolved candida glabrata fungemia, ELVI improving.     NEURO: Hx of depression: cont Effexor (halved dose in setting of renal dysfunx). Anxiety: Lorazepam PRN. Holistic consult for anxiety and insomnia. Gabapentin for restless leg syndrome.   CV: Hx of Afib and recent Aflutter alternating with sinus tachycardia (HR 100s),: cont Metoprolol 10q4hr for heart rate control s/p previous DCCV, off Amio and lipitor due to persistent transaminitis.  BLE duplex (1/5) no DVT, TTE  (7/18) - PASP 64mmHg, EF 65-70%. holding Losartan & norvasc because not absorbing PO meds, HTN: hydralazine Standing. TTE (1/4) LVEF 75%, mild/mod AR, PASP 40, RVEF wnl. POCUS chronic bilateral effusions.   PULM: Atelectasis/dyspnea improved clinically: cont 1 hr of BiPAP every 12hrs of nasal canula or room air. Encourage OOB and IS.  CT from 11/14 with ALDEN ground glass opacity of unclear etiology. COVID negative. RVP negative. completed 7d empiric cefepime 11/22 to cover for potential PNA.   GI/FEN: Low res, low fat, high protein diet. Cont Ensure max x1/day. Folate, thiamine. PICC replaced 12/4, switched out PICC on 1/4. Continue TPN/lipids. High output EC fistula: cont Octreotide & Cholestyramine 10/18. Transaminitis elevated T bili of unclear origin, s/p Perc Deb on 9/11, failed capping trial. Repeat capping trial 1/2, no elevation in Tbili to date; seen by Hepatology - MRCP 10/30 no obstruction, cont ursodiol, RUQ US 11/25 CBD 5mm, hepatic vessels patent  : Voids. ELVI improved: stopped famotidine given renal dysfunction. MARLO Duplex and RP US unremarkable, CK wnl.    ENDO: Hx hypothyroid: IV Synthroid, TSH low on 11/30, decreased synthroid dose, repeat TSH 12/6 wnl. Repeat thryoid studies WNL 1/3/24.   ID: currently not on any abx/antifungals. BCx 11/22 grew candida glabrata, likely CLABSI. s/p Caspo & completed Fluconazole course (12/1-12/9). Ophthalmology evaluated - normal ocular exam. Echo no vegetation. PICC replaced on 12/4. Cont Nystatin powder. // DC: caspofungin (11/24-12/1). empiric 7days cefepime (11/15-11/22), C. tertium, Lactobacillis from IR cx 7/3, and candida albicans, lactobacillus, vanc sensitive E faecium, vanc resistant E gallinarum, vanc resistant E casseliflavus, lactobacillus from OR cx 7/5. Completed course of abx with Imipenem (9/10-9/15). Imipenem (8/26--) imipenem (6/30-7/12, 7/23-7/30), Dapto (6/30-7/5 and 7/23-7/24). Empiric dapto (8/23-24) and cefepime (8/23-24).   PPX: SCDs, SQH ppx was on Therapeutic lovenox bid for R brachial vein DVT, but will hold off on anticoagulation since repeat US Duplex negative.  HEME: Anemia - continue Epogen weekly. S/p Iron Sucrose 200 qd x 3 days for chronic anemia.   LINES: PIVs, New RUE PICC placed (1/5--) will plan to switch PICC once a month secondary to history of fungemia, OLD LUE PICC removed (12/4 - 1/5) // DC: LUE PICC (9/1-11/1 ), RUE PICC: (11/1-11/24)    WOUNDS/DRAINS: Abdominal wound and fistula with wound manager in place dressing change daily. had recurrent punctate bleeding at wound bed, s/p placed surgicel, attempted to stitch, electrocaudery and epi soaked gauze. RLQ Ostomy. IR perc deb tube capped 1/2. . // DC: L Clay drain.  PT: Ambulate as tolerated OOB to chair daily.  DISPO: SICU due to complicated hospital course.   Pa initiated through [facility]. Interval Events:  No events overnight   Patient seen and examined at bedside.      Allergies    penicillin (Angioedema)    Intolerances        Vital Signs Last 24 Hrs  T(C): 36.4 (10 Sukhdev 2024 05:15), Max: 36.9 (10 Sukhdev 2024 01:09)  T(F): 97.6 (10 Sukhdev 2024 05:15), Max: 98.4 (10 Sukhdev 2024 01:09)  HR: 107 (10 Sukhdev 2024 05:52) (74 - 107)  BP: 143/67 (10 Sukhdev 2024 02:15) (123/58 - 178/86)  BP(mean): 96 (10 Sukhdev 2024 02:15) (83 - 123)  RR: 18 (10 Sukhdev 2024 05:52) (18 - 36)  SpO2: 93% (10 Sukhdev 2024 05:52) (93% - 100%)    Parameters below as of 10 Sukhdev 2024 05:52  Patient On (Oxygen Delivery Method): room air        01-08 @ 07:01  -  01-09 @ 07:00  --------------------------------------------------------  IN: 3948 mL / OUT: 4180 mL / NET: -232 mL    01-09 @ 07:01  -  01-10 @ 06:36  --------------------------------------------------------  IN: 3403 mL / OUT: 2895 mL / NET: 508 mL      01-08 @ 07:01  -  01-09 @ 07:00  --------------------------------------------------------  IN: 3948 mL / OUT: 4180 mL / NET: -232 mL    01-09 @ 07:01  -  01-10 @ 06:36  --------------------------------------------------------  IN: 3403 mL / OUT: 2895 mL / NET: 508 mL        Physical Exam:     Gen: NAD  Neurological: AAOx3, CNII-XII intact,  strength 5/5 b/l  ENT: mucus membrane moist, icteric  Cardiovascular: RRR  Respiratory: CTA  Gastrointestinal: soft, NT, ND, BS+, fistula with greenish light brown output, ostomy on right side also light green/brownish liquid output   Extremities: warm, no dependent edema  Vascular: no cyanosis/erythema  Skin: no rashes, Jaundice.   MSK: no joint swelling.       LABS:      CBC Full  -  ( 09 Jan 2024 05:30 )  WBC Count : 8.31 K/uL  RBC Count : 2.90 M/uL  Hemoglobin : 9.0 g/dL  Hematocrit : 28.5 %  Platelet Count - Automated : 190 K/uL  Mean Cell Volume : 98.3 fl  Mean Cell Hemoglobin : 31.0 pg  Mean Cell Hemoglobin Concentration : 31.6 gm/dL  Auto Neutrophil # : x  Auto Lymphocyte # : x  Auto Monocyte # : x  Auto Eosinophil # : x  Auto Basophil # : x  Auto Neutrophil % : x  Auto Lymphocyte % : x  Auto Monocyte % : x  Auto Eosinophil % : x  Auto Basophil % : x    01-09    136  |  102  |  41<H>  ----------------------------<  130<H>  4.0   |  25  |  1.33<H>    Ca    8.6      09 Jan 2024 05:30  Phos  3.7     01-09  Mg     2.1     01-09    TPro  8.7<H>  /  Alb  2.9<L>  /  TBili  6.1<H>  /  DBili  4.0<H>  /  AST  99<H>  /  ALT  63<H>  /  AlkPhos  115  01-09          Urinalysis Basic - ( 09 Jan 2024 05:30 )    Color: x / Appearance: x / SG: x / pH: x  Gluc: 130 mg/dL / Ketone: x  / Bili: x / Urobili: x   Blood: x / Protein: x / Nitrite: x   Leuk Esterase: x / RBC: x / WBC x   Sq Epi: x / Non Sq Epi: x / Bacteria: x              RADIOLOGY & ADDITIONAL STUDIES (The following images were personally reviewed):          A/p:78M w PMH Crohn's, AFib/Flutter s/p DCCVs on amiodarone, remote ileocectomy and open appendectomy. Admitted (6/23) for SBO vs Crohns flare, s/p NGT decompression and s/p lap converted to open TRE, SBR x 3, left in discontinuity with abthera vac on (6/27), RTOR for ileocolic resection, small bowel anastomosis, and abdominal wall closure on (6/28), c/b fluid collection s/p IR aspiration of perihepatic fluid on (7/3), c/b wound dehiscence s/p RTOR exlap, washout, ileocolic resection with end ileostomy, blow hole colostomy, red rubber from ileostomy to small bowel anastomosis; vicryl bridging mesh on (7/5) transferred to SICU postoperatively for hemodynamic monitoring, with hospital course complicated by periods of severe ELVI and hyponatremia, which resolved but stepped back up for to SICU on (9/10) for acute AMS, intermittent hypoglycemia, AFib with RVR. Percutaneous Cholecystostomy placed on (9/11) for enlarged GB, PCT capped 10/5 and uncapped 10/25 for hyperbilirubinemia, Right brachial DVT, left basillic and cephalic superficial thrombus on duplex 11/2, CT 11/14 with ALDEN ground glass opacity of unclear etiology, completed empiric 7day cefepime course 11/22, rising T-bili of unclear etilogy, now w resolved candida glabrata fungemia, ELVI improving.     NEURO: Hx of depression: cont Effexor (halved dose in setting of renal dysfunx). Anxiety: Lorazepam PRN. Holistic consult for anxiety and insomnia. Gabapentin for restless leg syndrome.   CV: Hx of Afib and recent Aflutter alternating with sinus tachycardia (HR 100s),: Upon review no change in rate after increase in Metoprolol 10q4hr for heart rate control therefore will decreased Lopressor to 10 q6. Started therapeutic lovenox on 1/10. EP consulted suggesting poss timolol eye drops( will f/u with Optho)  s/p previous DCCV, off Amio and lipitor due to persistent transaminitis.  BLE duplex (1/5) no DVT, TTE  (7/18) - PASP 64mmHg, EF 65-70%. holding Losartan & norvasc because not absorbing PO meds, HTN: hydralazine 10 q6  Standing. TTE (1/4) LVEF 75%, mild/mod AR, PASP 40, RVEF wnl.  PULM: Atelectasis/dyspnea improved clinically: cont 1 hr of BiPAP every 12hrs and nasal canula or room air. Encourage OOB and IS.  CT from 11/14 with ALDEN ground glass opacity of unclear etiology. COVID negative. RVP negative. completed 7d empiric cefepime 11/22 to cover for potential PNA.   GI/FEN: Low res, low fat, high protein diet. Cont Ensure max x1/day however pt likely not absorbing adequately due to high fistula. Folate, thiamine. PICC replaced 12/4, switched out PICC on 1/4. Continue TPN/lipids 1L extra in TPN in order to make I/O even. High output EC fistula: cont Octreotide & Cholestyramine 10/18. Transaminitis slowly improved elevated T bili, s/p Perc Deb on 9/11, failed capping trial. Repeat capping trial 1/2, no elevation in Tbili to date; seen by Hepatology - MRCP 10/30 no obstruction, cont ursodiol, RUQ US 11/25 CBD 5mm, hepatic vessels patent. Cont Vit d weekly.   : Voids. ELVI improved: stopped famotidine given renal dysfunction. MARLO Duplex and RP US unremarkable, CK wnl.    ENDO: Hx hypothyroid: IV Synthroid,  Repeat thryoid studies WNL 1/3/24.   ID: currently not on any abx/antifungals. BCx 11/22 grew candida glabrata, likely CLABSI. s/p Caspo & completed Fluconazole course (12/1-12/9). Ophthalmology evaluated - normal ocular exam. Echo no vegetation. PICC replaced on 12/4. Cont Nystatin powder. // DC: caspofungin (11/24-12/1). empiric 7days cefepime (11/15-11/22), C. tertium, Lactobacillis from IR cx 7/3, and candida albicans, lactobacillus, vanc sensitive E faecium, vanc resistant E gallinarum, vanc resistant E casseliflavus, lactobacillus from OR cx 7/5. Completed course of abx with Imipenem (9/10-9/15). Imipenem (8/26--) imipenem (6/30-7/12, 7/23-7/30), Dapto (6/30-7/5 and 7/23-7/24). Empiric dapto (8/23-24) and cefepime (8/23-24).   PPX: SCDs, Cont Lovenox therapeutic for Afib  HEME: Anemia - continue Epogen weekly. S/p Iron Sucrose 200 qd x 3 days for chronic anemia.   LINES: PIVs, New RUE PICC placed (1/5--) will plan to switch PICC once a month secondary to history of fungemia, OLD LUE PICC removed (12/4 - 1/5) // DC: LUE PICC (9/1-11/1 ), RUE PICC: (11/1-11/24)    WOUNDS/DRAINS: Abdominal wound and fistula with wound manager in place dressing change daily. had recurrent punctate bleeding at wound bed, s/p placed surgicel, attempted to stitch, electrocaudery and epi soaked gauze. Cont epinephrine soaked gauze at bedside RLQ Ostomy. IR perc deb tube capped 1/2. . // DC: L Clay drain.  PT: Ambulate as tolerated OOB to chair daily.  DISPO: SICU due to complicated hospital course. Goal to dc home in 2weeks: Spoke with case management and wound care on 1/10 to alert them of possible discharge home. with services

## 2024-01-10 NOTE — PROGRESS NOTE ADULT - NS ATTEND AMEND GEN_ALL_CORE FT
UC, AF, s/p SBR, ileocolic resection, ileostomy, enteroatmospheric fistula  physical as above  exploring gattux  increase TPN volume to 3 liters  keep cholecystostomy capped  continue metoprolol  start lovenox for AC

## 2024-01-10 NOTE — PROGRESS NOTE ADULT - SUBJECTIVE AND OBJECTIVE BOX
EPS Progress Note    S: remains in AFL     d/w ophthalmology- can consider use of timolol eye drops in addition to IVP metoprolol for possible improvement in rate control but result on HR may be minimal. OK to give a trial of timolol ophthalmic drops BID i.s.o normal ocular pressure.     started on lovenox.     O: T(C): 36.4 (01-10-24 @ 10:00), Max: 36.9 (01-10-24 @ 01:09)  HR: 105 (01-10-24 @ 11:51) (93 - 108)  BP: 137/65 (01-10-24 @ 11:51) (122/68 - 157/72)  RR: 18 (01-10-24 @ 11:51) (16 - 20)  SpO2: 96% (01-10-24 @ 11:51) (93% - 100%)      PHYSICAL  General:  NAD        Chest:  CTA B/L, no w/r/r  Cardiac:  RRR, + s1/s2 , no m/g/r  Abdomen:   soft ND/NT  Extremities: No edema, b/l groin no hematoma/bleeding/oozing  Skin: no rash noted, normal color and pigmentation  Psych: A&Ox3, normal affect and mood  Neuro: no deficit noted     LABS:                        9.0    8.31  )-----------( 190      ( 09 Jan 2024 05:30 )             28.5     01-09    136  |  102  |  41<H>  ----------------------------<  130<H>  4.0   |  25  |  1.33<H>    Ca    8.6      09 Jan 2024 05:30  Phos  3.7     01-09  Mg     2.1     01-09    TPro  8.7<H>  /  Alb  2.9<L>  /  TBili  6.1<H>  /  DBili  4.0<H>  /  AST  99<H>  /  ALT  63<H>  /  AlkPhos  115  01-09          MEDICATIONS:  chlorhexidine 0.12% Liquid 15 milliLiter(s) Swish and Spit two times a day PRN  chlorhexidine 2% Cloths 1 Application(s) Topical <User Schedule>  cholestyramine Powder (Sugar-Free) 4 Gram(s) Oral daily  enoxaparin Injectable 90 milliGRAM(s) SubCutaneous every 12 hours  EPINEPHrine   1 mG/mL (1:1,000) Topical Solution 1 Application(s) Topical once  epoetin matt-epbx (RETACRIT) Injectable 69182 Unit(s) SubCutaneous every 7 days  ergocalciferol 84257 Unit(s) Oral <User Schedule>  gabapentin 100 milliGRAM(s) Oral at bedtime  glucagon  Injectable 1 milliGRAM(s) IntraMuscular once  hydrALAZINE Injectable 10 milliGRAM(s) IV Push every 6 hours  levothyroxine Injectable 30 MICROGram(s) IV Push at bedtime  lipid, fat emulsion (Fish Oil and Plant Based) 20% Infusion 0.54 Gm/kG/Day IV Continuous <Continuous>  LORazepam   Injectable 1 milliGRAM(s) IV Push <User Schedule> PRN  metoprolol tartrate Injectable 10 milliGRAM(s) IV Push every 6 hours  ondansetron Injectable 4 milliGRAM(s) IV Push every 6 hours PRN  Parenteral Nutrition - Adult 1 Each TPN Continuous <Continuous>  Parenteral Nutrition - Adult 1 Each TPN Continuous <Continuous>  ursodiol Suspension 300 milliGRAM(s) Oral every 8 hours  venlafaxine XR. 150 milliGRAM(s) Oral daily      ASSESSMENT/PLAN  78M with history of Crohn's disease, known AF/AFL s/p multiple DCCV in the past, previously on amiodarone, with a prolonged hospital stay following extensive abdominal surgery c/b fistula. EP now being reconsulted for the management of persistent atrial flutter. Pt has been in AFL since December, but asymptomatic and rates well controlled.    -continue iv push metoprolol. per team, he will go home on this as well  -start timolol 0.5% ocular drops- 1 drop in each eye BID  -will continue to monitor telemetry closely  -continue lovenox, will monitor for any signs of superficial or major bleeding.   -No role for DCCV at this time until patient proves able to tolerate persistent anticoagulation without bleeding, especially given patient rates are controlled and he is asymptomatic.       EPS Progress Note    S: remains in AFL     d/w ophthalmology- can consider use of timolol eye drops in addition to IVP metoprolol for possible improvement in rate control but result on HR may be minimal. OK to give a trial of timolol ophthalmic drops BID i.s.o normal ocular pressure.     started on lovenox.     O: T(C): 36.4 (01-10-24 @ 10:00), Max: 36.9 (01-10-24 @ 01:09)  HR: 105 (01-10-24 @ 11:51) (93 - 108)  BP: 137/65 (01-10-24 @ 11:51) (122/68 - 157/72)  RR: 18 (01-10-24 @ 11:51) (16 - 20)  SpO2: 96% (01-10-24 @ 11:51) (93% - 100%)      PHYSICAL  General:  NAD        Chest:  CTA B/L, no w/r/r  Cardiac:  RRR, + s1/s2 , no m/g/r  Abdomen:   soft ND/NT  Extremities: No edema, b/l groin no hematoma/bleeding/oozing  Skin: no rash noted, normal color and pigmentation  Psych: A&Ox3, normal affect and mood  Neuro: no deficit noted     LABS:                        9.0    8.31  )-----------( 190      ( 09 Jan 2024 05:30 )             28.5     01-09    136  |  102  |  41<H>  ----------------------------<  130<H>  4.0   |  25  |  1.33<H>    Ca    8.6      09 Jan 2024 05:30  Phos  3.7     01-09  Mg     2.1     01-09    TPro  8.7<H>  /  Alb  2.9<L>  /  TBili  6.1<H>  /  DBili  4.0<H>  /  AST  99<H>  /  ALT  63<H>  /  AlkPhos  115  01-09          MEDICATIONS:  chlorhexidine 0.12% Liquid 15 milliLiter(s) Swish and Spit two times a day PRN  chlorhexidine 2% Cloths 1 Application(s) Topical <User Schedule>  cholestyramine Powder (Sugar-Free) 4 Gram(s) Oral daily  enoxaparin Injectable 90 milliGRAM(s) SubCutaneous every 12 hours  EPINEPHrine   1 mG/mL (1:1,000) Topical Solution 1 Application(s) Topical once  epoetin matt-epbx (RETACRIT) Injectable 43434 Unit(s) SubCutaneous every 7 days  ergocalciferol 11325 Unit(s) Oral <User Schedule>  gabapentin 100 milliGRAM(s) Oral at bedtime  glucagon  Injectable 1 milliGRAM(s) IntraMuscular once  hydrALAZINE Injectable 10 milliGRAM(s) IV Push every 6 hours  levothyroxine Injectable 30 MICROGram(s) IV Push at bedtime  lipid, fat emulsion (Fish Oil and Plant Based) 20% Infusion 0.54 Gm/kG/Day IV Continuous <Continuous>  LORazepam   Injectable 1 milliGRAM(s) IV Push <User Schedule> PRN  metoprolol tartrate Injectable 10 milliGRAM(s) IV Push every 6 hours  ondansetron Injectable 4 milliGRAM(s) IV Push every 6 hours PRN  Parenteral Nutrition - Adult 1 Each TPN Continuous <Continuous>  Parenteral Nutrition - Adult 1 Each TPN Continuous <Continuous>  ursodiol Suspension 300 milliGRAM(s) Oral every 8 hours  venlafaxine XR. 150 milliGRAM(s) Oral daily      ASSESSMENT/PLAN  78M with history of Crohn's disease, known AF/AFL s/p multiple DCCV in the past, previously on amiodarone, with a prolonged hospital stay following extensive abdominal surgery c/b fistula. EP now being reconsulted for the management of persistent atrial flutter. Pt has been in AFL since December, but asymptomatic and rates well controlled.    -continue iv push metoprolol. per team, he will go home on this as well  -start timolol 0.5% ocular drops- 1 drop in each eye BID  -will continue to monitor telemetry closely  -continue lovenox, will monitor for any signs of superficial or major bleeding.   -No role for DCCV at this time until patient proves able to tolerate persistent anticoagulation without bleeding, especially given patient rates are controlled and he is asymptomatic.

## 2024-01-11 NOTE — CHART NOTE - NSCHARTNOTEFT_GEN_A_CORE
Admitting Diagnosis:   Patient is a 78y old  Male who presents with a chief complaint of SBO (11 Jan 2024 06:33)      PAST MEDICAL & SURGICAL HISTORY:  Essential hypertension  Hypertension      Atrial fibrillation  s/p cardioversion 2010 and 2014  Pt. reports 4 DCCV  Now on Amiodarone 200mg PO bid and Eliquis 5mg PO bid  Last DCCV 4 yrs ago at [facility]      Crohn's disease  s/p partial resection of ileum      Hyperlipidemia      Hypothyroidism      History of depression  On Venlafaxine ER 150mg PO bid      Junctional rhythm      H/O knee surgery      History of cataract surgery          Current Nutrition Order: TPN via PICC- 3L bag running over 14hrs, providing 380g Dex, 120g AA, 50g SMOF lipids daily; provides 2272 kcals, 120g protein daily, GIR 4.81 mg/kg/min   PO- Regular diet + 2 LPS per day [provide 100 kcals, 15g protein each], + Ensure Max daily [150 kcals, 20g protein]    PO Intake: Good (%) [ X  ]  Fair (50-75%) [   ] Poor (<25%) [   ]    GI Issues:   1/11/24: ileostomy output 0cc x 24hrs, abdominal wound/fistula output 1750cc x 24hrs, per cony output 0cc x 24hrs [capped 1/2]  1/8/24: noted brownish output from [id number], total amount not documented; abdominal wound/fistula output 2285cc x 24hrs, per cony output 0cc x 24hrs [capped 1/2]  1/3/24:  ileostomy output 0cc x 24hrs, abdominal wound/fistula output 2550cc x 24hrs, per cony output 275cc x 24hrs  12/29: ileostomy output 50cc x 24hrs, abdominal wound/fistula output 910cc x 24hrs, per cony output 1225cc x 24hrs  12/26: ileostomy output 50cc x 24hrs, abdominal wound/fistula output 1175cc x 24hrs, per cony output 1050cc x 24hrs  12/21: ileostomy output 50cc x 24hrs, abdominal wound/fistula output 1100cc x 24hrs, per cony output 1050cc x 24hrs     12/19: ileostomy output 20cc x 24hrs, abdominal wound/fistula output 1275cc x 24hrs, per cony output 775cc x 24hrs    12/14: ileostomy output 15cc x 24hrs, abdominal wound/fistula output  1230cc x 24hrs, per cony output 1010cc x 24hrs    12/12: ileostomy output 75cc x 24hrs, abdominal wound/fistula output  600cc x 24hrs, per cony output 1125cc x 24hrs    12/5: ileostomy output 35cc x 24hrs, abdominal wound/fistula output  250cc x 24hrs, per cony output 745cc x 24hrs    12/1: ileostomy output 30cc x 24hrs, abdominal wound/fistula output  2400cc x 24hrs, per cony output 550cc x 24hrs   11/27: ileostomy output 50 cc x 24hrs, abdominal wound/fistula output 2175 cc x 24hrs, per cony output 530 cc x 24hrs   11/22: ileostomy output 25cc x 24hrs, abdominal wound/fistula output 2350 cc x 24hrs, per cony output 775cc x 24hrs   11/20: ileostomy output 55cc x 24hrs, abdominal wound/fistula output 2025 cc x 24hrs, per cony output 450cc x 24hrs   11/15: ileostomy output 65cc x 24hrs, abdominal wound/fistula output 875cc x 24hrs, per cony output 775cc x 24hrs    Pain: no pain/discomfort noted    Skin Integrity: abd wound and fistula with wound manager in place, RLQ ileostomy, perc cony drain. Rudy score 19      Labs:   01-11    137  |  104  |  41<H>  ----------------------------<  177<H>  4.6   |  24  |  1.38<H>    Ca    8.4      11 Jan 2024 05:30  Phos  4.4     01-11  Mg     2.2     01-11    TPro  8.2  /  Alb  2.6<L>  /  TBili  5.8<H>  /  DBili  3.8<H>  /  AST  110<H>  /  ALT  71<H>  /  AlkPhos  114  01-11    CAPILLARY BLOOD GLUCOSE          Medications:  MEDICATIONS  (STANDING):  chlorhexidine 2% Cloths 1 Application(s) Topical <User Schedule>  cholestyramine Powder (Sugar-Free) 4 Gram(s) Oral daily  enoxaparin Injectable 90 milliGRAM(s) SubCutaneous every 12 hours  EPINEPHrine   1 mG/mL (1:1,000) Topical Solution 1 Application(s) Topical once  epoetin matt-epbx (RETACRIT) Injectable 17229 Unit(s) SubCutaneous every 7 days  ergocalciferol 63022 Unit(s) Oral <User Schedule>  gabapentin 100 milliGRAM(s) Oral at bedtime  glucagon  Injectable 1 milliGRAM(s) IntraMuscular once  hydrALAZINE Injectable 10 milliGRAM(s) IV Push every 6 hours  levothyroxine Injectable 30 MICROGram(s) IV Push at bedtime  lipid, fat emulsion (Fish Oil and Plant Based) 20% Infusion 0.54 Gm/kG/Day (20.83 mL/Hr) IV Continuous <Continuous>  melatonin 5 milliGRAM(s) Oral at bedtime  metoprolol tartrate Injectable 10 milliGRAM(s) IV Push every 6 hours  Parenteral Nutrition - Adult 1 Each TPN Continuous <Continuous>  Parenteral Nutrition - Adult 1 Each TPN Continuous <Continuous>  timolol 0.5% Solution 1 Drop(s) Both EYES daily  ursodiol Suspension 300 milliGRAM(s) Oral every 8 hours  venlafaxine XR. 150 milliGRAM(s) Oral daily    MEDICATIONS  (PRN):  chlorhexidine 0.12% Liquid 15 milliLiter(s) Swish and Spit two times a day PRN oral hygeine  LORazepam   Injectable 1 milliGRAM(s) IV Push <User Schedule> PRN Anxiety  ondansetron Injectable 4 milliGRAM(s) IV Push every 6 hours PRN Nausea and/or Vomiting      Weight: 94kg [07 Jan 2024]  Daily 93.3kg [19 Dec 2023]  Daily 93.3kg [11 Dec 2023]  Daily 93.3kg [08 Dec 2023]  Daily 93.3kg [6 Dec 2023]  Daily 94.2kg [28 Nov 2023]  Daily 94.4kg [27 Nov 2023]  Daily 94.2kg [26 Nov 2023]  Daily 94.2kg [22 Nov 2023]  Daily 94.2kg [16 Nov 2023]  Daily 94.2 [15 Nov 2023]  Daily 94.2kg [13 Nov 2023]  Daily 95.8kg [09 Nov 2023]  Daily 94.2kg [07 Nov 2023]  Daily 85.2kg [03 Nov 2023]  Daily 85.2kg [31 Oct 2023]  Daily 85.2kg [24 Oct 2023]  Daily 85.2kg [20 Oct 2023]  Daily 81.9kg [18 Oct 2023]  Daily 85.2kg [16 Oct 2023]  Daily   95.2kg [12 Oct 2023]   Daily 87.7kg [11 Oct 2023]  Daily 87.4kg [09 Oct 2023]  Daily 86.4kg [07 Oct 2023]  Daily 82.5kg [06 Oct 2023]  Daily 82.6kg [4 Oct 2023]   Daily 83.5kg [03 Oct 2023]  Daily 84.5kg [2 Oct 2023]  Daily 87.2kg [1 Oct 2023]  Daily 88.3kg [29 Sep 2023]  Daily 89.9kg [28 Sep 2023]  Daily 87.7kg [24 Sep 2023]  Daily 90.4kg [21 Sep 2023]  Daily 91.4kg [20 Sep 2023]  Daily 86.7kg [18 Sep 2023]  Daily 88.1kg [16 Sep 2023]  Daily 88.9kg [15 Sep 2023]   Daily 89.1 [14 Sep 2023]  Daily 92.9kg [6 Sep 2023]  Daily 91.8kg [27 Aug 2023]  Daily 101kg [9 Aug 2023]     Weight Change: weight previously trending down, then up- now appears to be stable x ~2 months. Recommend continue weekly/daily weights for trending & ensuring adequacy of nutrition provision    IBW: 86.4kg  % IBW: 108.8%    Estimated energy needs:   Ideal body weight (86.4kg) used for calculations as pt >100% IBW and BMI <30 per [facility] Standards of Care. Needs estimated for age and adjusted for current clinical status, increased needs for post-op & open abd wound healing    Calories: 1375-2718 kcals based on 30-40 kcals/kg  Protein: 129.6-172.8 g protein based on 1.5-2.0g protein/kg + additional depending on outputs  *Fluid needs per team    Subjective:   78 M w/ Crohn's, AFib/Flutter s/p DCCVs on amiodarone, remote ileocectomy and open appendectomy. Admitted (6/23) for SBO vs Crohns flare, s/p NGT decompression and s/p lap TRE converted to open TRE, SBR x 3, left in discontinuity with abthera vac on (6/27), RTOR for ileocolic resection, small bowel anastomosis, and abdominal wall closure on (6/28), c/b fluid collection s/p IR aspiration of perihepatic fluid on (7/3), c/b wound dehiscence s/p RTOR exlap, washout, ileocolic resection with end ileostomy, blow hole colostomy, red rubber from ileostomy to small bowel anastomosis; vicryl bridging mesh on (7/5) transferred to SICU postoperatively for hemodynamic monitoring, with hospital course complicated by periods of AMS most recently on 9/1 and associated with oliguria, severe ELVI and hyponatremia, which resolved but stepped back up for to SICU on 9/10 for acute AMS, intermittent hypoglycemia, AFib with RVR. Percutaneous Cholecystostomy placed on 9/11 for enlarged GB, PCT capped 10/5 and uncapped 10/25 for hyperbilirubinemia, Right brachial DVT, left basillic and cephalic superficial thrombus on duplex 11/2, CT 11/14 with ALDEN ground glass opacity of unclear etiology, completed empiric 7day cefepime course 11/22, rising T-bili of unclear etilogy, now w resolved candida glabrata fungemia, ELVI improving.     Chart reviewed, pt seen at bedside on 5 EA with IDT during AM rounds. Rx and Meds reviewed. 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 Continues with lipids & copper daily with close monitoring of hepatic function. TPN volume increased to 3L, trending I&Os and adjusting prn. Ongoing vit D supplementation, to recheck levels monthly. Labs reviewed- BUN 41 <H>, Creat 1.38 <H>, total bilirubin 5.8 <H>, direct bilirubin 3.8 <H> [improving],  <H>, ALT 71 <H>. Meds: on 50,000 units ergocalciferol weekly, zofran prn, synthroid [administer 30-60 minutes before food; separate at least 4hrs from calcium or iron-containing products or bile acid sequestrants], EPO, ursodiol, cholestyramine. RDN will continue to monitor, reassess, and intervene as appropriate.       Previous Nutrition Diagnosis:  1. Increased Nutrient Needs R/T physiological demands for nutrient AEB post-op wound healing  Active [ X  ]  Resolved [   ]    2. Altered GI function R/T extensive GI history, insufficient bowel, high output fistula AEB supplemental PN requirement to meet nutrition needs  Active [ X  ]  Resolved [   ]    If resolved, new PES:     Goal:  Pt will meet at least 75% of protein & energy needs via most appropriate route for nutrition     Recommendations:  1. c/w TPN via PICC as primary means to nutrition - 3L bag running over 14hrs (214ml/hr), providing 380g Dex, 120g AA, 50g SMOF lipids daily; provides 2272 kcals, 120g protein daily, GIR 4.81 mg/kg/min   - provides 26.3 kcals/kg, 20.7 non-protein kcals/kg, 1.4g protein/kg  - monitor weights & wound healing, adjust substrates as indicated  - fluid & lytes per team  - MVI, thiamine, vit C in bag  2. PO diet per team discretion  -  c/w Regular diet as medically feasible to allow for more menu options  - c/w 1 LPS BID [200 kcals, 30g protein] + 1 Ensure Max daily [150 kcals, 30g protein] as amenable  - consider NPO for bowel rest as indicated   - ongoing education prn  - close monitoring of weights, fistula outputs, urine outputs & consider UUN & fecal fat studies to monitor changes in PO absorption  3. Weekly lipid panel while on TPN  - hold/decrease lipids if TG >400  - continue to trend LFTs  4. Selenium & copper in TPN daily  - monitor zinc levels and adjust prn  - recheck levels in 4 weeks  - c/w Vit D supplementation and recheck levels in 4 wks  5. Recommend weekly weights for trending/ensuring adequacy of nutrition provision  6. Hydration per team  - risk for dehydration with high outputs, monitor  - additional fluids per team  - consider oral rehydration solution  7. Monitor BMP/Mg/Phos, POC BG while on TPN  - monitor & replenish lytes outside TPN bag prn  8. Continue close monitoring of clinical course & adjust recommendations prn    Education: ongoing diet education    Risk Level: High [  X ] Moderate [   ] Low [   ] Admitting Diagnosis:   Patient is a 78y old  Male who presents with a chief complaint of SBO (11 Jan 2024 06:33)      PAST MEDICAL & SURGICAL HISTORY:  Essential hypertension  Hypertension      Atrial fibrillation  s/p cardioversion 2010 and 2014  Pt. reports 4 DCCV  Now on Amiodarone 200mg PO bid and Eliquis 5mg PO bid  Last DCCV 4 yrs ago at [facility]      Crohn's disease  s/p partial resection of ileum      Hyperlipidemia      Hypothyroidism      History of depression  On Venlafaxine ER 150mg PO bid      Junctional rhythm      H/O knee surgery      History of cataract surgery          Current Nutrition Order: TPN via PICC- 3L bag running over 14hrs, providing 380g Dex, 120g AA, 50g SMOF lipids daily; provides 2272 kcals, 120g protein daily, GIR 4.81 mg/kg/min   PO- Regular diet + 2 LPS per day [provide 100 kcals, 15g protein each], + Ensure Max daily [150 kcals, 20g protein]    PO Intake: Good (%) [ X  ]  Fair (50-75%) [   ] Poor (<25%) [   ]    GI Issues:   1/11/24: ileostomy output 0cc x 24hrs, abdominal wound/fistula output 1750cc x 24hrs, per cony output 0cc x 24hrs [capped 1/2]  1/8/24: noted brownish output from [id number], total amount not documented; abdominal wound/fistula output 2285cc x 24hrs, per cony output 0cc x 24hrs [capped 1/2]  1/3/24:  ileostomy output 0cc x 24hrs, abdominal wound/fistula output 2550cc x 24hrs, per cony output 275cc x 24hrs  12/29: ileostomy output 50cc x 24hrs, abdominal wound/fistula output 910cc x 24hrs, per cony output 1225cc x 24hrs  12/26: ileostomy output 50cc x 24hrs, abdominal wound/fistula output 1175cc x 24hrs, per cony output 1050cc x 24hrs  12/21: ileostomy output 50cc x 24hrs, abdominal wound/fistula output 1100cc x 24hrs, per cony output 1050cc x 24hrs     12/19: ileostomy output 20cc x 24hrs, abdominal wound/fistula output 1275cc x 24hrs, per cony output 775cc x 24hrs    12/14: ileostomy output 15cc x 24hrs, abdominal wound/fistula output  1230cc x 24hrs, per cony output 1010cc x 24hrs    12/12: ileostomy output 75cc x 24hrs, abdominal wound/fistula output  600cc x 24hrs, per cony output 1125cc x 24hrs    12/5: ileostomy output 35cc x 24hrs, abdominal wound/fistula output  250cc x 24hrs, per cony output 745cc x 24hrs    12/1: ileostomy output 30cc x 24hrs, abdominal wound/fistula output  2400cc x 24hrs, per cony output 550cc x 24hrs   11/27: ileostomy output 50 cc x 24hrs, abdominal wound/fistula output 2175 cc x 24hrs, per cony output 530 cc x 24hrs   11/22: ileostomy output 25cc x 24hrs, abdominal wound/fistula output 2350 cc x 24hrs, per cony output 775cc x 24hrs   11/20: ileostomy output 55cc x 24hrs, abdominal wound/fistula output 2025 cc x 24hrs, per cony output 450cc x 24hrs   11/15: ileostomy output 65cc x 24hrs, abdominal wound/fistula output 875cc x 24hrs, per cony output 775cc x 24hrs    Pain: no pain/discomfort noted    Skin Integrity: abd wound and fistula with wound manager in place, RLQ ileostomy, perc cony drain. Rudy score 19      Labs:   01-11    137  |  104  |  41<H>  ----------------------------<  177<H>  4.6   |  24  |  1.38<H>    Ca    8.4      11 Jan 2024 05:30  Phos  4.4     01-11  Mg     2.2     01-11    TPro  8.2  /  Alb  2.6<L>  /  TBili  5.8<H>  /  DBili  3.8<H>  /  AST  110<H>  /  ALT  71<H>  /  AlkPhos  114  01-11    CAPILLARY BLOOD GLUCOSE          Medications:  MEDICATIONS  (STANDING):  chlorhexidine 2% Cloths 1 Application(s) Topical <User Schedule>  cholestyramine Powder (Sugar-Free) 4 Gram(s) Oral daily  enoxaparin Injectable 90 milliGRAM(s) SubCutaneous every 12 hours  EPINEPHrine   1 mG/mL (1:1,000) Topical Solution 1 Application(s) Topical once  epoetin matt-epbx (RETACRIT) Injectable 00034 Unit(s) SubCutaneous every 7 days  ergocalciferol 05588 Unit(s) Oral <User Schedule>  gabapentin 100 milliGRAM(s) Oral at bedtime  glucagon  Injectable 1 milliGRAM(s) IntraMuscular once  hydrALAZINE Injectable 10 milliGRAM(s) IV Push every 6 hours  levothyroxine Injectable 30 MICROGram(s) IV Push at bedtime  lipid, fat emulsion (Fish Oil and Plant Based) 20% Infusion 0.54 Gm/kG/Day (20.83 mL/Hr) IV Continuous <Continuous>  melatonin 5 milliGRAM(s) Oral at bedtime  metoprolol tartrate Injectable 10 milliGRAM(s) IV Push every 6 hours  Parenteral Nutrition - Adult 1 Each TPN Continuous <Continuous>  Parenteral Nutrition - Adult 1 Each TPN Continuous <Continuous>  timolol 0.5% Solution 1 Drop(s) Both EYES daily  ursodiol Suspension 300 milliGRAM(s) Oral every 8 hours  venlafaxine XR. 150 milliGRAM(s) Oral daily    MEDICATIONS  (PRN):  chlorhexidine 0.12% Liquid 15 milliLiter(s) Swish and Spit two times a day PRN oral hygeine  LORazepam   Injectable 1 milliGRAM(s) IV Push <User Schedule> PRN Anxiety  ondansetron Injectable 4 milliGRAM(s) IV Push every 6 hours PRN Nausea and/or Vomiting      Weight: 94kg [07 Jan 2024]  Daily 93.3kg [19 Dec 2023]  Daily 93.3kg [11 Dec 2023]  Daily 93.3kg [08 Dec 2023]  Daily 93.3kg [6 Dec 2023]  Daily 94.2kg [28 Nov 2023]  Daily 94.4kg [27 Nov 2023]  Daily 94.2kg [26 Nov 2023]  Daily 94.2kg [22 Nov 2023]  Daily 94.2kg [16 Nov 2023]  Daily 94.2 [15 Nov 2023]  Daily 94.2kg [13 Nov 2023]  Daily 95.8kg [09 Nov 2023]  Daily 94.2kg [07 Nov 2023]  Daily 85.2kg [03 Nov 2023]  Daily 85.2kg [31 Oct 2023]  Daily 85.2kg [24 Oct 2023]  Daily 85.2kg [20 Oct 2023]  Daily 81.9kg [18 Oct 2023]  Daily 85.2kg [16 Oct 2023]  Daily   95.2kg [12 Oct 2023]   Daily 87.7kg [11 Oct 2023]  Daily 87.4kg [09 Oct 2023]  Daily 86.4kg [07 Oct 2023]  Daily 82.5kg [06 Oct 2023]  Daily 82.6kg [4 Oct 2023]   Daily 83.5kg [03 Oct 2023]  Daily 84.5kg [2 Oct 2023]  Daily 87.2kg [1 Oct 2023]  Daily 88.3kg [29 Sep 2023]  Daily 89.9kg [28 Sep 2023]  Daily 87.7kg [24 Sep 2023]  Daily 90.4kg [21 Sep 2023]  Daily 91.4kg [20 Sep 2023]  Daily 86.7kg [18 Sep 2023]  Daily 88.1kg [16 Sep 2023]  Daily 88.9kg [15 Sep 2023]   Daily 89.1 [14 Sep 2023]  Daily 92.9kg [6 Sep 2023]  Daily 91.8kg [27 Aug 2023]  Daily 101kg [9 Aug 2023]     Weight Change: weight previously trending down, then up- now appears to be stable x ~2 months. Recommend continue weekly/daily weights for trending & ensuring adequacy of nutrition provision    IBW: 86.4kg  % IBW: 108.8%    Estimated energy needs:   Ideal body weight (86.4kg) used for calculations as pt >100% IBW and BMI <30 per [facility] Standards of Care. Needs estimated for age and adjusted for current clinical status, increased needs for post-op & open abd wound healing    Calories: 4210-9068 kcals based on 30-40 kcals/kg  Protein: 129.6-172.8 g protein based on 1.5-2.0g protein/kg + additional depending on outputs  *Fluid needs per team    Subjective:   78 M w/ Crohn's, AFib/Flutter s/p DCCVs on amiodarone, remote ileocectomy and open appendectomy. Admitted (6/23) for SBO vs Crohns flare, s/p NGT decompression and s/p lap TRE converted to open TRE, SBR x 3, left in discontinuity with abthera vac on (6/27), RTOR for ileocolic resection, small bowel anastomosis, and abdominal wall closure on (6/28), c/b fluid collection s/p IR aspiration of perihepatic fluid on (7/3), c/b wound dehiscence s/p RTOR exlap, washout, ileocolic resection with end ileostomy, blow hole colostomy, red rubber from ileostomy to small bowel anastomosis; vicryl bridging mesh on (7/5) transferred to SICU postoperatively for hemodynamic monitoring, with hospital course complicated by periods of AMS most recently on 9/1 and associated with oliguria, severe ELVI and hyponatremia, which resolved but stepped back up for to SICU on 9/10 for acute AMS, intermittent hypoglycemia, AFib with RVR. Percutaneous Cholecystostomy placed on 9/11 for enlarged GB, PCT capped 10/5 and uncapped 10/25 for hyperbilirubinemia, Right brachial DVT, left basillic and cephalic superficial thrombus on duplex 11/2, CT 11/14 with ALDEN ground glass opacity of unclear etiology, completed empiric 7day cefepime course 11/22, rising T-bili of unclear etilogy, now w resolved candida glabrata fungemia, ELVI improving.     Chart reviewed, pt seen at bedside on 5 EA with IDT during AM rounds. Rx and Meds reviewed. 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 Continues with lipids & copper daily with close monitoring of hepatic function. TPN volume increased to 3L, trending I&Os and adjusting prn. Ongoing vit D supplementation, to recheck levels monthly. Labs reviewed- BUN 41 <H>, Creat 1.38 <H>, total bilirubin 5.8 <H>, direct bilirubin 3.8 <H> [improving],  <H>, ALT 71 <H>. Meds: on 50,000 units ergocalciferol weekly, zofran prn, synthroid [administer 30-60 minutes before food; separate at least 4hrs from calcium or iron-containing products or bile acid sequestrants], EPO, ursodiol, cholestyramine. RDN will continue to monitor, reassess, and intervene as appropriate.       Previous Nutrition Diagnosis:  1. Increased Nutrient Needs R/T physiological demands for nutrient AEB post-op wound healing  Active [ X  ]  Resolved [   ]    2. Altered GI function R/T extensive GI history, insufficient bowel, high output fistula AEB supplemental PN requirement to meet nutrition needs  Active [ X  ]  Resolved [   ]    If resolved, new PES:     Goal:  Pt will meet at least 75% of protein & energy needs via most appropriate route for nutrition     Recommendations:  1. c/w TPN via PICC as primary means to nutrition - 3L bag running over 14hrs (214ml/hr), providing 380g Dex, 120g AA, 50g SMOF lipids daily; provides 2272 kcals, 120g protein daily, GIR 4.81 mg/kg/min   - provides 26.3 kcals/kg, 20.7 non-protein kcals/kg, 1.4g protein/kg  - monitor weights & wound healing, adjust substrates as indicated  - fluid & lytes per team  - MVI, thiamine, vit C in bag  2. PO diet per team discretion  -  c/w Regular diet as medically feasible to allow for more menu options  - c/w 1 LPS BID [200 kcals, 30g protein] + 1 Ensure Max daily [150 kcals, 30g protein] as amenable  - consider NPO for bowel rest as indicated   - ongoing education prn  - close monitoring of weights, fistula outputs, urine outputs & consider UUN & fecal fat studies to monitor changes in PO absorption  3. Weekly lipid panel while on TPN  - hold/decrease lipids if TG >400  - continue to trend LFTs  4. Selenium & copper in TPN daily  - monitor zinc levels and adjust prn  - recheck levels in 4 weeks  - c/w Vit D supplementation and recheck levels in 4 wks  5. Recommend weekly weights for trending/ensuring adequacy of nutrition provision  6. Hydration per team  - risk for dehydration with high outputs, monitor  - additional fluids per team  - consider oral rehydration solution  7. Monitor BMP/Mg/Phos, POC BG while on TPN  - monitor & replenish lytes outside TPN bag prn  8. Continue close monitoring of clinical course & adjust recommendations prn    Education: ongoing diet education    Risk Level: High [  X ] Moderate [   ] Low [   ]

## 2024-01-11 NOTE — DISCHARGE NOTE PROVIDER - ATTENDING ATTESTATION STATEMENT
AMOXICILLIN 500 MG CAPSULE       Last Written Prescription Date:  7/27/23  Last Fill Quantity: 4,   # refills: 3  Last Office Visit : 1/10/23  Future Office visit:  None    Routing refill request to provider for review/approval because:  Drug not on the [facility], [facility] or [facility] refill protocol AMOXICILLIN 500 MG CAPSULE / dental prophylaxis    
I have personally seen and examined the patient. I have collaborated with and supervised the

## 2024-01-11 NOTE — PROGRESS NOTE ADULT - NS ATTEND AMEND GEN_ALL_CORE FT
UC, AF, s/p SBR, ileocolic resection, ileostomy, enteroatmospheric fistula  physical as above  exploring gattux  continue TPN volume at 3 liters  keep cholecystostomy capped  continue metoprolol  start lovenox for AC (not done yesterday)  wound care

## 2024-01-11 NOTE — PROGRESS NOTE ADULT - SUBJECTIVE AND OBJECTIVE BOX
STATUS POST:    6/27: Laparoscopic lysis of adhesions, converted to open lysis of adhesions, SBR x 3, temporary abdominal closure, 5L cryst, 1L 5% alb, 3 pRBC, 1 FFP, 1 plts  6/28: ex lap, removal of abthera, ileocolic resection, small bowel anastamosis, , 1.6 crystalloid, 250 5%, 175 uop   7/3: IR Gendel drained perihepatic aspiration of serous fluid.   7/5: RTOR exlap, washout, ileocolic resection with end ileostomy, blow hole colostomy, fistula, red rubber from ileostomy to small bowel anastomosis; vicryl bridging mesh; R JOYCE below ileostomy, L JOYCE at small bowel enterotomy repair; 500 LR, 500 5% albumin, 3u PRBC, 2 FFP, 400 UOP,   9/11: s/p perc cony.    SUBJECTIVE: Pt seen and examined at bedside this am by surgery team. Patient is lying comfortably in bed with no complaints. Tolerating diet, pain well controlled with current regimen. Patient denies fever, nausea, vomiting, chest pain, and shortness of breath. Ambulating daily.      MEDICATIONS  (STANDING):  chlorhexidine 2% Cloths 1 Application(s) Topical <User Schedule>  cholestyramine Powder (Sugar-Free) 4 Gram(s) Oral daily  enoxaparin Injectable 90 milliGRAM(s) SubCutaneous every 12 hours  EPINEPHrine   1 mG/mL (1:1,000) Topical Solution 1 Application(s) Topical once  epoetin matt-epbx (RETACRIT) Injectable 18071 Unit(s) SubCutaneous every 7 days  ergocalciferol 31417 Unit(s) Oral <User Schedule>  gabapentin 100 milliGRAM(s) Oral at bedtime  glucagon  Injectable 1 milliGRAM(s) IntraMuscular once  hydrALAZINE Injectable 10 milliGRAM(s) IV Push every 6 hours  levothyroxine Injectable 30 MICROGram(s) IV Push at bedtime  lipid, fat emulsion (Fish Oil and Plant Based) 20% Infusion 0.54 Gm/kG/Day (20.83 mL/Hr) IV Continuous <Continuous>  metoprolol tartrate Injectable 10 milliGRAM(s) IV Push every 6 hours  Parenteral Nutrition - Adult 1 Each TPN Continuous <Continuous>  timolol 0.5% Solution 1 Drop(s) Both EYES daily  ursodiol Suspension 300 milliGRAM(s) Oral every 8 hours  venlafaxine XR. 150 milliGRAM(s) Oral daily    MEDICATIONS  (PRN):  chlorhexidine 0.12% Liquid 15 milliLiter(s) Swish and Spit two times a day PRN oral hygeine  LORazepam   Injectable 1 milliGRAM(s) IV Push <User Schedule> PRN Anxiety  ondansetron Injectable 4 milliGRAM(s) IV Push every 6 hours PRN Nausea and/or Vomiting      Vital Signs Last 24 Hrs  T(C): 36.5 (11 Jan 2024 05:13), Max: 36.9 (10 Sukhdev 2024 22:02)  T(F): 97.7 (11 Jan 2024 05:13), Max: 98.5 (10 Sukhdev 2024 22:02)  HR: 90 (11 Jan 2024 07:00) (84 - 107)  BP: 135/64 (11 Jan 2024 06:00) (115/57 - 142/65)  BP(mean): 92 (11 Jan 2024 06:00) (74 - 94)  RR: 20 (11 Jan 2024 07:00) (12 - 26)  SpO2: 100% (11 Jan 2024 07:00) (94% - 100%)    Parameters below as of 11 Jan 2024 06:00  Patient On (Oxygen Delivery Method): BiPAP/CPAP  O2 Flow (L/min): 40      Physical Exam  General: Patient is doing well and lying in bed comfortably  Constitutional: alert and oriented   Pulm: Nonlabored breathing, no respiratory distress  CV: Regular rate and rhythm, normal sinus rhythm  Abd: soft, nontender, nondistended. No rebound, no guarding. Wound manager in place, dark brown/green output. No bleeding.  Extremities: warm, well perfused, no edema    I&O's Detail    10 Sukhdev 2024 07:01  -  11 Jan 2024 07:00  --------------------------------------------------------  IN:    Fat Emulsion (Fish Oil &amp; Plant Based) 20% Infusion: 249.6 mL    Oral Fluid: 1680 mL    TPN (Total Parenteral Nutrition): 3426 mL  Total IN: 5355.6 mL    OUT:    Drain (mL): 1750 mL    Drain (mL): 0 mL    Fat Emulsion (Fish Oil &amp; Plant Based) 20% Infusion: 0 mL    Voided (mL): 1675 mL  Total OUT: 3425 mL    Total NET: 1930.6 mL      11 Jan 2024 07:01  -  11 Jan 2024 08:41  --------------------------------------------------------  IN:    TPN (Total Parenteral Nutrition): 214 mL  Total IN: 214 mL    OUT:  Total OUT: 0 mL    Total NET: 214 mL        LABS:                        8.6    10.29 )-----------( 192      ( 11 Jan 2024 05:30 )             27.5     01-11    137  |  104  |  41<H>  ----------------------------<  177<H>  4.6   |  24  |  1.38<H>    Ca    8.4      11 Jan 2024 05:30  Phos  4.4     01-11  Mg     2.2     01-11    TPro  8.2  /  Alb  2.6<L>  /  TBili  5.8<H>  /  DBili  3.8<H>  /  AST  110<H>  /  ALT  71<H>  /  AlkPhos  114  01-11      Urinalysis Basic - ( 11 Jan 2024 05:30 )    Color: x / Appearance: x / SG: x / pH: x  Gluc: 177 mg/dL / Ketone: x  / Bili: x / Urobili: x   Blood: x / Protein: x / Nitrite: x   Leuk Esterase: x / RBC: x / WBC x   Sq Epi: x / Non Sq Epi: x / Bacteria: x     STATUS POST:    6/27: Laparoscopic lysis of adhesions, converted to open lysis of adhesions, SBR x 3, temporary abdominal closure, 5L cryst, 1L 5% alb, 3 pRBC, 1 FFP, 1 plts  6/28: ex lap, removal of abthera, ileocolic resection, small bowel anastamosis, , 1.6 crystalloid, 250 5%, 175 uop   7/3: IR Gendel drained perihepatic aspiration of serous fluid.   7/5: RTOR exlap, washout, ileocolic resection with end ileostomy, blow hole colostomy, fistula, red rubber from ileostomy to small bowel anastomosis; vicryl bridging mesh; R JOYCE below ileostomy, L JOYCE at small bowel enterotomy repair; 500 LR, 500 5% albumin, 3u PRBC, 2 FFP, 400 UOP,   9/11: s/p perc cony.    SUBJECTIVE: Pt seen and examined at bedside this am by surgery team. Patient is lying comfortably in bed with no complaints. Tolerating diet, pain well controlled with current regimen. Patient denies fever, nausea, vomiting, chest pain, and shortness of breath. Ambulating daily.      MEDICATIONS  (STANDING):  chlorhexidine 2% Cloths 1 Application(s) Topical <User Schedule>  cholestyramine Powder (Sugar-Free) 4 Gram(s) Oral daily  enoxaparin Injectable 90 milliGRAM(s) SubCutaneous every 12 hours  EPINEPHrine   1 mG/mL (1:1,000) Topical Solution 1 Application(s) Topical once  epoetin matt-epbx (RETACRIT) Injectable 54542 Unit(s) SubCutaneous every 7 days  ergocalciferol 15323 Unit(s) Oral <User Schedule>  gabapentin 100 milliGRAM(s) Oral at bedtime  glucagon  Injectable 1 milliGRAM(s) IntraMuscular once  hydrALAZINE Injectable 10 milliGRAM(s) IV Push every 6 hours  levothyroxine Injectable 30 MICROGram(s) IV Push at bedtime  lipid, fat emulsion (Fish Oil and Plant Based) 20% Infusion 0.54 Gm/kG/Day (20.83 mL/Hr) IV Continuous <Continuous>  metoprolol tartrate Injectable 10 milliGRAM(s) IV Push every 6 hours  Parenteral Nutrition - Adult 1 Each TPN Continuous <Continuous>  timolol 0.5% Solution 1 Drop(s) Both EYES daily  ursodiol Suspension 300 milliGRAM(s) Oral every 8 hours  venlafaxine XR. 150 milliGRAM(s) Oral daily    MEDICATIONS  (PRN):  chlorhexidine 0.12% Liquid 15 milliLiter(s) Swish and Spit two times a day PRN oral hygeine  LORazepam   Injectable 1 milliGRAM(s) IV Push <User Schedule> PRN Anxiety  ondansetron Injectable 4 milliGRAM(s) IV Push every 6 hours PRN Nausea and/or Vomiting      Vital Signs Last 24 Hrs  T(C): 36.5 (11 Jan 2024 05:13), Max: 36.9 (10 Sukhdev 2024 22:02)  T(F): 97.7 (11 Jan 2024 05:13), Max: 98.5 (10 Sukhdev 2024 22:02)  HR: 90 (11 Jan 2024 07:00) (84 - 107)  BP: 135/64 (11 Jan 2024 06:00) (115/57 - 142/65)  BP(mean): 92 (11 Jan 2024 06:00) (74 - 94)  RR: 20 (11 Jan 2024 07:00) (12 - 26)  SpO2: 100% (11 Jan 2024 07:00) (94% - 100%)    Parameters below as of 11 Jan 2024 06:00  Patient On (Oxygen Delivery Method): BiPAP/CPAP  O2 Flow (L/min): 40      Physical Exam  General: Patient is doing well and lying in bed comfortably  Constitutional: alert and oriented   Pulm: Nonlabored breathing, no respiratory distress  CV: Regular rate and rhythm, normal sinus rhythm  Abd: soft, nontender, nondistended. No rebound, no guarding. Wound manager in place, dark brown/green output. No bleeding.  Extremities: warm, well perfused, no edema    I&O's Detail    10 Sukhdev 2024 07:01  -  11 Jan 2024 07:00  --------------------------------------------------------  IN:    Fat Emulsion (Fish Oil &amp; Plant Based) 20% Infusion: 249.6 mL    Oral Fluid: 1680 mL    TPN (Total Parenteral Nutrition): 3426 mL  Total IN: 5355.6 mL    OUT:    Drain (mL): 1750 mL    Drain (mL): 0 mL    Fat Emulsion (Fish Oil &amp; Plant Based) 20% Infusion: 0 mL    Voided (mL): 1675 mL  Total OUT: 3425 mL    Total NET: 1930.6 mL      11 Jan 2024 07:01  -  11 Jan 2024 08:41  --------------------------------------------------------  IN:    TPN (Total Parenteral Nutrition): 214 mL  Total IN: 214 mL    OUT:  Total OUT: 0 mL    Total NET: 214 mL        LABS:                        8.6    10.29 )-----------( 192      ( 11 Jan 2024 05:30 )             27.5     01-11    137  |  104  |  41<H>  ----------------------------<  177<H>  4.6   |  24  |  1.38<H>    Ca    8.4      11 Jan 2024 05:30  Phos  4.4     01-11  Mg     2.2     01-11    TPro  8.2  /  Alb  2.6<L>  /  TBili  5.8<H>  /  DBili  3.8<H>  /  AST  110<H>  /  ALT  71<H>  /  AlkPhos  114  01-11      Urinalysis Basic - ( 11 Jan 2024 05:30 )    Color: x / Appearance: x / SG: x / pH: x  Gluc: 177 mg/dL / Ketone: x  / Bili: x / Urobili: x   Blood: x / Protein: x / Nitrite: x   Leuk Esterase: x / RBC: x / WBC x   Sq Epi: x / Non Sq Epi: x / Bacteria: x

## 2024-01-11 NOTE — PROGRESS NOTE ADULT - SUBJECTIVE AND OBJECTIVE BOX
Interval Events:  No adverse events overnight.   Patient seen and examined at bedside.      Allergies    penicillin (Angioedema)    Intolerances        Vital Signs Last 24 Hrs  T(C): 36.5 (11 Jan 2024 05:13), Max: 36.9 (10 Sukhdev 2024 22:02)  T(F): 97.7 (11 Jan 2024 05:13), Max: 98.5 (10 Sukhdev 2024 22:02)  HR: 84 (11 Jan 2024 06:00) (84 - 108)  BP: 135/64 (11 Jan 2024 06:00) (115/57 - 155/72)  BP(mean): 92 (11 Jan 2024 06:00) (74 - 103)  RR: 24 (11 Jan 2024 06:00) (12 - 26)  SpO2: 100% (11 Jan 2024 06:00) (93% - 100%)    Parameters below as of 11 Jan 2024 06:00  Patient On (Oxygen Delivery Method): BiPAP/CPAP  O2 Flow (L/min): 40      01-09 @ 07:01  -  01-10 @ 07:00  --------------------------------------------------------  IN: 3382.2 mL / OUT: 3035 mL / NET: 347.2 mL    01-10 @ 07:01 - 01-11 @ 06:33  --------------------------------------------------------  IN: 4927.6 mL / OUT: 2925 mL / NET: 2002.6 mL      01-09 @ 07:01  -  01-10 @ 07:00  --------------------------------------------------------  IN: 3382.2 mL / OUT: 3035 mL / NET: 347.2 mL    01-10 @ 07:01 - 01-11 @ 06:33  --------------------------------------------------------  IN: 4927.6 mL / OUT: 2925 mL / NET: 2002.6 mL        Physical Exam:     Gen: NAD  Neurological: AAOx3, CNII-XII intact,  strength 5/5 b/l  ENT: mucus membrane moist, icteric  Cardiovascular: RRR  Respiratory: CTA  Gastrointestinal: soft, NT, ND, BS+, fistula with greenish light brown output, ostomy on right side also light green/brownish liquid output   Extremities: warm, no dependent edema  Vascular: no cyanosis/erythema  Skin: no rashes, Jaundice.   MSK: no joint swelling.         LABS:                              RADIOLOGY & ADDITIONAL STUDIES (The following images were personally reviewed):          A/p: A/p:78M w PMH Crohn's, AFib/Flutter s/p DCCVs on amiodarone, remote ileocectomy and open appendectomy. Admitted (6/23) for SBO vs Crohns flare, s/p NGT decompression and s/p lap converted to open TRE, SBR x 3, left in discontinuity with abthera vac on (6/27), RTOR for ileocolic resection, small bowel anastomosis, and abdominal wall closure on (6/28), c/b fluid collection s/p IR aspiration of perihepatic fluid on (7/3), c/b wound dehiscence s/p RTOR exlap, washout, ileocolic resection with end ileostomy, blow hole colostomy, red rubber from ileostomy to small bowel anastomosis; vicryl bridging mesh on (7/5) transferred to SICU postoperatively for hemodynamic monitoring, with hospital course complicated by periods of severe ELVI and hyponatremia, which resolved but stepped back up for to SICU on (9/10) for acute AMS, intermittent hypoglycemia, AFib with RVR. Percutaneous Cholecystostomy placed on (9/11) for enlarged GB, PCT capped 10/5 and uncapped 10/25 for hyperbilirubinemia, Right brachial DVT, left basillic and cephalic superficial thrombus on duplex 11/2, CT 11/14 with ALDEN ground glass opacity of unclear etiology, completed empiric 7day cefepime course 11/22, rising T-bili of unclear etilogy, now w resolved candida glabrata fungemia, ELVI improving.     NEURO: Hx of depression: cont Effexor (halved dose in setting of renal dysfunx). Anxiety: Lorazepam PRN. Holistic consult for anxiety and insomnia. Gabapentin for restless leg syndrome.   HEENT: Timolol added on 1/10 for additional systemic BB support in setting of mal absorption  CV: Hx of Afib and recent Aflutter alternating with sinus tachycardia (HR 100s),: Cont Lopressor to 10 q6 added timolol ggt for attempted Systemic affect. Started therapeutic lovenox on 1/10.  s/p previous DCCV, off Amio and lipitor due to persistent transaminitis.  BLE duplex (1/5) no DVT, TTE  (7/18) - PASP 64mmHg, EF 65-70%. holding Losartan & norvasc because not absorbing PO meds, HTN: hydralazine 10 q6  Standing. TTE (1/4) LVEF 75%, mild/mod AR, PASP 40, RVEF wnl. Given Increased fluid in TPN ______  PULM: Atelectasis/dyspnea improved clinically: cont 1 hr of BiPAP every 12hrs and nasal canula or room air. Encourage OOB and IS.  CT from 11/14 with ALDEN ground glass opacity of unclear etiology. COVID negative. RVP negative. completed 7d empiric cefepime 11/22 to cover for potential PNA.   GI/FEN: Low res, low fat, high protein diet. Cont Ensure max x1/day however pt likely not absorbing adequately due to high fistula. Folate, thiamine. PICC replaced 12/4, switched out PICC on 1/4. Continue TPN/lipids 1L extra in TPN in order to make I/O even. High output EC fistula: cont Octreotide & Cholestyramine 10/18. Transaminitis slowly improved elevated T bili, s/p Perc Deb on 9/11, failed capping trial. Repeat capping trial 1/2, no elevation in Tbili to date; seen by Hepatology - MRCP 10/30 no obstruction, cont ursodiol, RUQ US 11/25 CBD 5mm, hepatic vessels patent. Cont Vit d weekly.   : Voids. ELVI improved: stopped famotidine given renal dysfunction. MARLO Duplex and RP US unremarkable, CK wnl.    ENDO: Hx hypothyroid: IV Synthroid,  Repeat thryoid studies WNL 1/3/24.   ID: currently not on any abx/antifungals. BCx 11/22 grew candida glabrata, likely CLABSI. s/p Caspo & completed Fluconazole course (12/1-12/9). Ophthalmology evaluated - normal ocular exam. Echo no vegetation. PICC replaced on 12/4. Cont Nystatin powder. // DC: caspofungin (11/24-12/1). empiric 7days cefepime (11/15-11/22), C. tertium, Lactobacillis from IR cx 7/3, and candida albicans, lactobacillus, vanc sensitive E faecium, vanc resistant E gallinarum, vanc resistant E casseliflavus, lactobacillus from OR cx 7/5. Completed course of abx with Imipenem (9/10-9/15). Imipenem (8/26--) imipenem (6/30-7/12, 7/23-7/30), Dapto (6/30-7/5 and 7/23-7/24). Empiric dapto (8/23-24) and cefepime (8/23-24).   PPX: SCDs, Cont Lovenox therapeutic for Afib  HEME: Anemia - continue Epogen weekly. S/p Iron Sucrose 200 qd x 3 days for chronic anemia.   LINES: PIVs, New RUE PICC placed (1/5--) will plan to switch PICC once a month secondary to history of fungemia, Previous LUE PICC removed (12/4 - 1/5) // DC: LUE PICC (9/1-11/1 ), RUE PICC: (11/1-11/24)    WOUNDS/DRAINS: Abdominal wound and fistula with wound manager in place dressing change daily. had recurrent punctate bleeding at wound bed, s/p placed surgicel, attempted to stitch, electrocaudery and epi soaked gauze. Cont epinephrine soaked gauze at bedside RLQ Ostomy. IR perc deb tube capped 1/2. . // DC: L Clay drain.  PT: Ambulate as tolerated OOB to chair daily.  DISPO: SICU due to complicated hospital course. Goal to dc home in 2weeks: Spoke with case management and wound care on 1/10 to alert them of possible discharge home. with services   Interval Events:  No adverse events overnight.   Patient seen and examined at bedside.      Allergies    penicillin (Angioedema)    Intolerances        Vital Signs Last 24 Hrs  T(C): 36.5 (11 Jan 2024 05:13), Max: 36.9 (10 Sukhdev 2024 22:02)  T(F): 97.7 (11 Jan 2024 05:13), Max: 98.5 (10 Sukhdev 2024 22:02)  HR: 84 (11 Jan 2024 06:00) (84 - 108)  BP: 135/64 (11 Jan 2024 06:00) (115/57 - 155/72)  BP(mean): 92 (11 Jan 2024 06:00) (74 - 103)  RR: 24 (11 Jan 2024 06:00) (12 - 26)  SpO2: 100% (11 Jan 2024 06:00) (93% - 100%)    Parameters below as of 11 Jan 2024 06:00  Patient On (Oxygen Delivery Method): BiPAP/CPAP  O2 Flow (L/min): 40      01-09 @ 07:01  -  01-10 @ 07:00  --------------------------------------------------------  IN: 3382.2 mL / OUT: 3035 mL / NET: 347.2 mL    01-10 @ 07:01 - 01-11 @ 06:33  --------------------------------------------------------  IN: 4927.6 mL / OUT: 2925 mL / NET: 2002.6 mL      01-09 @ 07:01  -  01-10 @ 07:00  --------------------------------------------------------  IN: 3382.2 mL / OUT: 3035 mL / NET: 347.2 mL    01-10 @ 07:01 - 01-11 @ 06:33  --------------------------------------------------------  IN: 4927.6 mL / OUT: 2925 mL / NET: 2002.6 mL        Physical Exam:     Gen: NAD  Neurological: AAOx3, CNII-XII intact,  strength 5/5 b/l  ENT: mucus membrane moist, icteric  Cardiovascular: RRR  Respiratory: CTA  Gastrointestinal: soft, NT, ND, BS+, fistula with greenish light brown output, ostomy on right side also light green/brownish liquid output.  Extremities: warm, no dependent edema  Vascular: no cyanosis/erythema  Skin: no rashes, Jaundice.   MSK: no joint swelling.         LABS:                              RADIOLOGY & ADDITIONAL STUDIES (The following images were personally reviewed):          A/p: 78M w PMH Crohn's, AFib/Flutter s/p DCCVs on amiodarone, remote ileocectomy and open appendectomy. Admitted (6/23) for SBO vs Crohns flare, s/p NGT decompression and s/p lap converted to open TRE, SBR x 3, left in discontinuity with abthera vac on (6/27), RTOR for ileocolic resection, small bowel anastomosis, and abdominal wall closure on (6/28), c/b fluid collection s/p IR aspiration of perihepatic fluid on (7/3), c/b wound dehiscence s/p RTOR exlap, washout, ileocolic resection with end ileostomy, blow hole colostomy, red rubber from ileostomy to small bowel anastomosis; vicryl bridging mesh on (7/5) transferred to SICU postoperatively for hemodynamic monitoring, with hospital course complicated by periods of severe ELVI and hyponatremia, which resolved but stepped back up for to SICU on (9/10) for acute AMS, intermittent hypoglycemia, AFib with RVR. Percutaneous Cholecystostomy placed on (9/11) for enlarged GB, PCT capped 10/5 and uncapped 10/25 for hyperbilirubinemia, Right brachial DVT, left basillic and cephalic superficial thrombus on duplex 11/2, CT 11/14 with ALDEN ground glass opacity of unclear etiology, completed empiric 7day cefepime course 11/22, rising T-bili of unclear etilogy, now w resolved candida glabrata fungemia, ELVI improving.     NEURO: Hx of depression: cont Effexor (halved dose in setting of renal dysfunx). Anxiety: Lorazepam PRN. Holistic consult for anxiety and insomnia. Gabapentin for restless leg syndrome.   HEENT: Timolol added on 1/10 for additional systemic BB support in setting of mal absorption  CV: Hx of Afib and recent Aflutter alternating with sinus tachycardia (HR 100s),: Cont Lopressor to 10 q6 added timolol ggt for attempted Systemic affect. Started therapeutic lovenox on 1/10.  s/p previous DCCV, off Amio and lipitor due to persistent transaminitis.  BLE duplex (1/5) no DVT, TTE  (7/18) - PASP 64mmHg, EF 65-70%. holding Losartan & norvasc because not absorbing PO meds, HTN: hydralazine 10 q6  Standing. TTE (1/4) LVEF 75%, mild/mod AR, PASP 40, RVEF wnl. Given Increased fluid in TPN  will watch I/O over the next few days.   PULM: Atelectasis/dyspnea improved clinically: cont 1 hr of BiPAP every 12hrs and nasal canula or room air. Encourage OOB and IS.  CT from 11/14 with ALDEN ground glass opacity of unclear etiology. COVID negative. RVP negative. Completed 7d empiric cefepime 11/22 to cover for potential PNA.   GI/FEN: Low res, low fat, high protein diet. Cont Ensure max x1/day however pt likely not absorbing adequately due to high fistula. Folate, thiamine. PICC replaced 12/4, switched out PICC on 1/4. Continue TPN/lipids 1L extra in TPN in order to make I/O even. 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 Discussed with Pharmacy and with patient. cont Octreotide & Cholestyramine 10/18. Transaminitis slowly improved elevated T bili, s/p Perc Deb on 9/11, failed capping trial. Repeat capping trial 1/2, no elevation in Tbili to date; seen by Hepatology - MRCP 10/30 no obstruction, cont ursodiol, RUQ US 11/25 CBD 5mm, hepatic vessels patent. Cont Vit d weekly.   : Voids. ELVI improved: stopped famotidine given renal dysfunction. MARLO Duplex and RP US unremarkable, CK wnl.    ENDO: Hx hypothyroid: IV Synthroid,  Repeat thyroid studies WNL 1/3/24.   ID: currently not on any abx/antifungals. BCx 11/22 grew candida glabrata, likely CLABSI. s/p Caspo & completed Fluconazole course (12/1-12/9). Ophthalmology evaluated - normal ocular exam. Echo no vegetation. PICC replaced on 12/4. Cont Nystatin powder. // DC: caspofungin (11/24-12/1). empiric 7days cefepime (11/15-11/22), C. tertium, Lactobacillus from IR cx 7/3, and candida albicans, lactobacillus, vanc sensitive E faecium, vanc resistant E gallinarum, vanc resistant E casseliflavus, lactobacillus from OR cx 7/5. Completed course of abx with Imipenem (9/10-9/15). Imipenem (8/26--) imipenem (6/30-7/12, 7/23-7/30), Dapto (6/30-7/5 and 7/23-7/24). Empiric Dapto (8/23-24) and Cefepime (8/23-24).   PPX: SCDs, Cont Lovenox therapeutic for Afib  HEME: Anemia - continue Epogen weekly. S/p Iron Sucrose 200 qd x 3 days for chronic anemia.   LINES: PIVs, New RUE PICC placed (1/5--) will plan to switch PICC once a month secondary to history of fungemia, Previous LUE PICC removed (12/4 - 1/5) // DC: LUE PICC (9/1-11/1 ), RUE PICC: (11/1-11/24)    WOUNDS/DRAINS: Abdominal wound and fistula with wound manager in place dressing change daily. Had recurrent punctate bleeding at wound bed, s/p placed surgicel, attempted to stitch, electrocautery and epi soaked gauze. Cont epinephrine soaked gauze at bedside RLQ Ostomy. IR perc deb tube capped 1/2. . // DC: L Clay drain.  PT: Ambulate as tolerated OOB to chair daily.  DISPO: SICU due to complicated hospital course. Goal to dc home in 2weeks: Spoke with case management and wound care on 1/10 to alert them of possible discharge home with services the week of 1/22 vs 1/29.

## 2024-01-11 NOTE — PROGRESS NOTE ADULT - ASSESSMENT
The patient is a 78 year old male with a PMHx of Crohn's, AFib/Flutter s/p DCCVs on amiodarone, remote ileocectomy and open appendectomy. Admitted (6/23) for SBO vs Crohns flare, s/p NGT decompression and s/p lap converted to open TRE, SBR x 3, left in discontinuity with abthera vac on (6/27), RTOR for ileocolic resection, small bowel anastomosis, and abdominal wall closure on (6/28), c/b fluid collection s/p IR aspiration of perihepatic fluid on (7/3), c/b wound dehiscence s/p RTOR exlap, washout, ileocolic resection with end ileostomy, blow hole colostomy, red rubber from ileostomy to small bowel anastomosis; vicryl bridging mesh on (7/5) transferred to SICU postoperatively for hemodynamic monitoring, with hospital course complicated by periods of severe ELVI and hyponatremia, which resolved but stepped back up for to SICU on (9/10) for acute AMS, intermittent hypoglycemia, AFib with RVR. Percutaneous Cholecystostomy placed on (9/11) for enlarged GB, PCT capped 10/5 and uncapped 10/25 for hyperbilirubinemia, Right brachial DVT, left basillic and cephalic superficial thrombus on duplex 11/2, CT 11/14 with ALDEN ground glass opacity of unclear etiology, completed empiric 7day cefepime course 11/22, rising T-bili of unclear etiology now w resolved candida glabrata fungemia, ELVI improving.     Reg Diet  Pain/nausea control prn  SQH/SCDs/OOBA/IS  BiPAP q12h prn  Wound manager changes daily  C/W octreotide, cholestyramine  C/W Epogen weekly  Possible tube study next week with possible removal of perc cony  Rest of care per SICU

## 2024-01-12 NOTE — ADVANCED PRACTICE NURSE CONSULT - RECOMMEDATIONS
WOCN will continue to follow the patient.   Total consult time 1 hr and 15 min; required 3 WOCNs, 2 to change pouching systems and 1 to videotape.

## 2024-01-12 NOTE — PROGRESS NOTE ADULT - SUBJECTIVE AND OBJECTIVE BOX
INTERVAL/OVERNIGHT EVENTS: AMRITA.    SUBJECTIVE: This AM doing well. Pain well controlled. No N/V or abd pain.    Neurologic Medications  gabapentin 100 milliGRAM(s) Oral at bedtime  LORazepam   Injectable 1 milliGRAM(s) IV Push <User Schedule> PRN Anxiety  melatonin 5 milliGRAM(s) Oral at bedtime  ondansetron Injectable 4 milliGRAM(s) IV Push every 6 hours PRN Nausea and/or Vomiting  venlafaxine XR. 150 milliGRAM(s) Oral daily    Cardiovascular Medications  hydrALAZINE Injectable 10 milliGRAM(s) IV Push every 6 hours  metoprolol tartrate Injectable 10 milliGRAM(s) IV Push every 6 hours    Gastrointestinal Medications  ergocalciferol 54235 Unit(s) Oral <User Schedule>  lipid, fat emulsion (Fish Oil and Plant Based) 20% Infusion 0.54 Gm/kG/Day IV Continuous <Continuous>  Parenteral Nutrition - Adult 1 Each TPN Continuous <Continuous>  ursodiol Suspension 300 milliGRAM(s) Oral every 8 hours    Hematologic/Oncologic Medications  enoxaparin Injectable 90 milliGRAM(s) SubCutaneous every 12 hours  epoetin matt-epbx (RETACRIT) Injectable 88266 Unit(s) SubCutaneous every 7 days    Endocrine/Metabolic Medications  cholestyramine Powder (Sugar-Free) 4 Gram(s) Oral daily  glucagon  Injectable 1 milliGRAM(s) IntraMuscular once  levothyroxine Injectable 30 MICROGram(s) IV Push at bedtime    Topical/Other Medications  chlorhexidine 0.12% Liquid 15 milliLiter(s) Swish and Spit two times a day PRN oral hygeine  chlorhexidine 2% Cloths 1 Application(s) Topical <User Schedule>  timolol 0.5% Solution 1 Drop(s) Both EYES daily    MEDICATIONS  (PRN):  chlorhexidine 0.12% Liquid 15 milliLiter(s) Swish and Spit two times a day PRN oral hygeine  LORazepam   Injectable 1 milliGRAM(s) IV Push <User Schedule> PRN Anxiety  ondansetron Injectable 4 milliGRAM(s) IV Push every 6 hours PRN Nausea and/or Vomiting    I&O's Detail    11 Jan 2024 07:01  -  12 Jan 2024 07:00  --------------------------------------------------------  IN:    Fat Emulsion (Fish Oil &amp; Plant Based) 20% Infusion: 291.2 mL    Oral Fluid: 1200 mL    TPN (Total Parenteral Nutrition): 3426 mL  Total IN: 4917.2 mL    OUT:    Drain (mL): 1425 mL    Voided (mL): 1350 mL  Total OUT: 2775 mL    Total NET: 2142.2 mL    12 Jan 2024 07:01  -  12 Jan 2024 08:09  --------------------------------------------------------  IN:    TPN (Total Parenteral Nutrition): 214 mL  Total IN: 214 mL    OUT:    Drain (mL): 100 mL  Total OUT: 100 mL    Total NET: 114 mL    Vital Signs Last 24 Hrs  T(C): 36.8 (12 Jan 2024 01:15), Max: 36.8 (12 Jan 2024 01:15)  T(F): 98.2 (12 Jan 2024 01:15), Max: 98.2 (12 Jan 2024 01:15)  HR: 91 (12 Jan 2024 06:00) (79 - 104)  BP: 113/59 (12 Jan 2024 06:00) (112/56 - 147/73)  BP(mean): 80 (12 Jan 2024 06:00) (75 - 104)  RR: 18 (12 Jan 2024 06:00) (18 - 26)  SpO2: 100% (12 Jan 2024 06:00) (96% - 100%)    Parameters below as of 12 Jan 2024 06:00  Patient On (Oxygen Delivery Method): BiPAP/CPAP    O2 Concentration (%): 40    GENERAL: NAD, resting comfortably in bed, AxOx3, jaundiced  HEENT: NCAT, MMM  C/V: Normal rate, normal peripheral perfusion, no murmurs  PULM: Nonlabored breathing, no respiratory distress, CTA b/l   ABD: Soft, ND, NT, no rebound tenderness, no guarding, PCT capped, ECF pouched.  EXTREM: WWP, no edema, SCDs in place  NEURO: No focal deficits    LABS:                        8.6    10.29 )-----------( 192      ( 11 Jan 2024 05:30 )             27.5     01-11    137  |  104  |  41<H>  ----------------------------<  177<H>  4.6   |  24  |  1.38<H>    Ca    8.4      11 Jan 2024 05:30  Phos  4.4     01-11  Mg     2.2     01-11    TPro  8.2  /  Alb  2.6<L>  /  TBili  5.8<H>  /  DBili  3.8<H>  /  AST  110<H>  /  ALT  71<H>  /  AlkPhos  114  01-11    Urinalysis Basic - ( 11 Jan 2024 05:30 )    Color: x / Appearance: x / SG: x / pH: x  Gluc: 177 mg/dL / Ketone: x  / Bili: x / Urobili: x   Blood: x / Protein: x / Nitrite: x   Leuk Esterase: x / RBC: x / WBC x   Sq Epi: x / Non Sq Epi: x / Bacteria: x   INTERVAL/OVERNIGHT EVENTS: AMRITA.    SUBJECTIVE: This AM doing well. Pain well controlled. No N/V or abd pain.    Neurologic Medications  gabapentin 100 milliGRAM(s) Oral at bedtime  LORazepam   Injectable 1 milliGRAM(s) IV Push <User Schedule> PRN Anxiety  melatonin 5 milliGRAM(s) Oral at bedtime  ondansetron Injectable 4 milliGRAM(s) IV Push every 6 hours PRN Nausea and/or Vomiting  venlafaxine XR. 150 milliGRAM(s) Oral daily    Cardiovascular Medications  hydrALAZINE Injectable 10 milliGRAM(s) IV Push every 6 hours  metoprolol tartrate Injectable 10 milliGRAM(s) IV Push every 6 hours    Gastrointestinal Medications  ergocalciferol 36847 Unit(s) Oral <User Schedule>  lipid, fat emulsion (Fish Oil and Plant Based) 20% Infusion 0.54 Gm/kG/Day IV Continuous <Continuous>  Parenteral Nutrition - Adult 1 Each TPN Continuous <Continuous>  ursodiol Suspension 300 milliGRAM(s) Oral every 8 hours    Hematologic/Oncologic Medications  enoxaparin Injectable 90 milliGRAM(s) SubCutaneous every 12 hours  epoetin matt-epbx (RETACRIT) Injectable 41591 Unit(s) SubCutaneous every 7 days    Endocrine/Metabolic Medications  cholestyramine Powder (Sugar-Free) 4 Gram(s) Oral daily  glucagon  Injectable 1 milliGRAM(s) IntraMuscular once  levothyroxine Injectable 30 MICROGram(s) IV Push at bedtime    Topical/Other Medications  chlorhexidine 0.12% Liquid 15 milliLiter(s) Swish and Spit two times a day PRN oral hygeine  chlorhexidine 2% Cloths 1 Application(s) Topical <User Schedule>  timolol 0.5% Solution 1 Drop(s) Both EYES daily    MEDICATIONS  (PRN):  chlorhexidine 0.12% Liquid 15 milliLiter(s) Swish and Spit two times a day PRN oral hygeine  LORazepam   Injectable 1 milliGRAM(s) IV Push <User Schedule> PRN Anxiety  ondansetron Injectable 4 milliGRAM(s) IV Push every 6 hours PRN Nausea and/or Vomiting    I&O's Detail    11 Jan 2024 07:01  -  12 Jan 2024 07:00  --------------------------------------------------------  IN:    Fat Emulsion (Fish Oil &amp; Plant Based) 20% Infusion: 291.2 mL    Oral Fluid: 1200 mL    TPN (Total Parenteral Nutrition): 3426 mL  Total IN: 4917.2 mL    OUT:    Drain (mL): 1425 mL    Voided (mL): 1350 mL  Total OUT: 2775 mL    Total NET: 2142.2 mL    12 Jan 2024 07:01  -  12 Jan 2024 08:09  --------------------------------------------------------  IN:    TPN (Total Parenteral Nutrition): 214 mL  Total IN: 214 mL    OUT:    Drain (mL): 100 mL  Total OUT: 100 mL    Total NET: 114 mL    Vital Signs Last 24 Hrs  T(C): 36.8 (12 Jan 2024 01:15), Max: 36.8 (12 Jan 2024 01:15)  T(F): 98.2 (12 Jan 2024 01:15), Max: 98.2 (12 Jan 2024 01:15)  HR: 91 (12 Jan 2024 06:00) (79 - 104)  BP: 113/59 (12 Jan 2024 06:00) (112/56 - 147/73)  BP(mean): 80 (12 Jan 2024 06:00) (75 - 104)  RR: 18 (12 Jan 2024 06:00) (18 - 26)  SpO2: 100% (12 Jan 2024 06:00) (96% - 100%)    Parameters below as of 12 Jan 2024 06:00  Patient On (Oxygen Delivery Method): BiPAP/CPAP    O2 Concentration (%): 40    GENERAL: NAD, resting comfortably in bed, AxOx3, jaundiced  HEENT: NCAT, MMM  C/V: Normal rate, normal peripheral perfusion, no murmurs  PULM: Nonlabored breathing, no respiratory distress, CTA b/l   ABD: Soft, ND, NT, no rebound tenderness, no guarding, PCT capped, ECF pouched.  EXTREM: WWP, no edema, SCDs in place  NEURO: No focal deficits    LABS:                        8.6    10.29 )-----------( 192      ( 11 Jan 2024 05:30 )             27.5     01-11    137  |  104  |  41<H>  ----------------------------<  177<H>  4.6   |  24  |  1.38<H>    Ca    8.4      11 Jan 2024 05:30  Phos  4.4     01-11  Mg     2.2     01-11    TPro  8.2  /  Alb  2.6<L>  /  TBili  5.8<H>  /  DBili  3.8<H>  /  AST  110<H>  /  ALT  71<H>  /  AlkPhos  114  01-11    Urinalysis Basic - ( 11 Jan 2024 05:30 )    Color: x / Appearance: x / SG: x / pH: x  Gluc: 177 mg/dL / Ketone: x  / Bili: x / Urobili: x   Blood: x / Protein: x / Nitrite: x   Leuk Esterase: x / RBC: x / WBC x   Sq Epi: x / Non Sq Epi: x / Bacteria: x

## 2024-01-12 NOTE — PROGRESS NOTE ADULT - NS ATTEND AMEND GEN_ALL_CORE FT
UC, AF, s/p SBR, ileocolic resection, ileostomy, enteroatmospheric fistula  physical as above  exploring gattux  continue TPN volume to 3 liters  keep cholecystostomy capped  continue metoprolol  on lovenox for AC

## 2024-01-12 NOTE — ADVANCED PRACTICE NURSE CONSULT - ASSESSMENT
WOCN videotaped changing of pouching systems with patient's permission and on patient's phone.   Ileostomy pinpoint opening draining small amount of yellow effluent. WOCN cleansed surrounding skin with cleansing wipe, applied skin prep, then a one piece convex pouching system.    Stomatized fistula functioning with brown, liquid effluent. Perifistula skin friable and denuded. Small area of bleeding to perifistula skin controlled with Silver Nitrate and Surgicell.   WOCN cleansed denuded skin with normal saline, applied stoma powder, sealed powder with Cavilon skin prep, applied Mastisol to perifistula skin, applied ostomy rings around the fistula, then applied an [employer] [id number] wound manager. Cut bottom of wound manager and applied a clip.  WOCN videotaped changing of pouching systems with patient's permission and on patient's phone.   Ileostomy pinpoint opening draining small amount of yellow effluent. WOCN cleansed surrounding skin with cleansing wipe, applied skin prep, then a one piece convex pouching system.    Stomatized fistula functioning with brown, liquid effluent. Perifistula skin friable and denuded. Small area of bleeding to perifistula skin controlled with Silver Nitrate and Surgicell.   WOCN cleansed denuded skin with normal saline, applied stoma powder, sealed powder with Cavilon skin prep, applied Mastisol to perifistula skin, applied ostomy rings around the fistula, then applied an [employer] [id number] wound manager. Cut bottom of wound manager and applied a clip.  WOCN videotaped changing of pouching systems with patient's permission and on patient's phone.   Ileostomy pinpoint opening draining small amount of yellow effluent. WOCN cleansed surrounding skin with cleansing wipe, applied skin prep, then a one piece convex pouching system.    Stomatized fistula functioning with brown, liquid effluent. Perifistula skin friable and denuded. Small area of bleeding to perifistula skin controlled with Silver Nitrate and Surgicell.   WOCN cleansed denuded skin with normal saline, applied stoma powder, sealed powder with Cavilon skin prep, applied Mastisol to perifistula skin, applied ostomy rings around the fistula, then applied an [facility] [id number] wound manager with Stoma paste. Cut bottom of wound manager and applied a clip.  WOCN videotaped changing of pouching systems with patient's permission and on patient's phone.   Ileostomy pinpoint opening draining small amount of yellow effluent. WOCN cleansed surrounding skin with cleansing wipe, applied skin prep, then a one piece convex pouching system.    Stomatized fistula functioning with brown, liquid effluent. Perifistula skin friable and denuded. Small area of bleeding to perifistula skin controlled with Silver Nitrate and Surgicell.   WOCN cleansed denuded skin with normal saline, applied stoma powder, sealed powder with Cavilon skin prep, applied Mastisol to perifistula skin, applied ostomy rings around the fistula, then applied an [facility] [id number] wound manager with Stoma paste. Cut bottom of wound manager and applied a clip.

## 2024-01-12 NOTE — PROGRESS NOTE ADULT - SUBJECTIVE AND OBJECTIVE BOX
STATUS POST:    6/27: Laparoscopic lysis of adhesions, converted to open lysis of adhesions, SBR x 3, temporary abdominal closure, 5L cryst, 1L 5% alb, 3 pRBC, 1 FFP, 1 plts  6/28: ex lap, removal of abthera, ileocolic resection, small bowel anastamosis, , 1.6 crystalloid, 250 5%, 175 uop   7/3: IR Gendel drained perihepatic aspiration of serous fluid.   7/5: RTOR exlap, washout, ileocolic resection with end ileostomy, blow hole colostomy, fistula, red rubber from ileostomy to small bowel anastomosis; vicryl bridging mesh; R JOYCE below ileostomy, L JOYCE at small bowel enterotomy repair; 500 LR, 500 5% albumin, 3u PRBC, 2 FFP, 400 UOP,   9/11: s/p perc cony.    SUBJECTIVE: Pt seen and examined at bedside this am by surgery team. Patient is lying comfortably in bed with no complaints. Tolerating diet, pain well controlled with current regimen. Patient denies fever, nausea, vomiting, chest pain, and shortness of breath. Ambulating daily.      SUBJECTIVE: Pt seen and examined at bedside this am by surgery team. Patient is lying comfortably in bed with no complaints. Tolerating diet, pain well controlled with current regimen. Patient denies fever, nausea, vomiting, chest pain, and shortness of breath.     MEDICATIONS  (STANDING):  chlorhexidine 2% Cloths 1 Application(s) Topical <User Schedule>  cholestyramine Powder (Sugar-Free) 4 Gram(s) Oral daily  enoxaparin Injectable 90 milliGRAM(s) SubCutaneous every 12 hours  epoetin matt-epbx (RETACRIT) Injectable 30494 Unit(s) SubCutaneous every 7 days  ergocalciferol 95922 Unit(s) Oral <User Schedule>  gabapentin 100 milliGRAM(s) Oral at bedtime  glucagon  Injectable 1 milliGRAM(s) IntraMuscular once  hydrALAZINE Injectable 10 milliGRAM(s) IV Push every 6 hours  levothyroxine Injectable 30 MICROGram(s) IV Push at bedtime  lipid, fat emulsion (Fish Oil and Plant Based) 20% Infusion 0.54 Gm/kG/Day (20.83 mL/Hr) IV Continuous <Continuous>  melatonin 5 milliGRAM(s) Oral at bedtime  metoprolol tartrate Injectable 10 milliGRAM(s) IV Push every 6 hours  Parenteral Nutrition - Adult 1 Each TPN Continuous <Continuous>  Parenteral Nutrition - Adult 1 Each TPN Continuous <Continuous>  timolol 0.5% Solution 1 Drop(s) Both EYES daily  ursodiol Suspension 300 milliGRAM(s) Oral every 8 hours  venlafaxine XR. 150 milliGRAM(s) Oral daily    MEDICATIONS  (PRN):  chlorhexidine 0.12% Liquid 15 milliLiter(s) Swish and Spit two times a day PRN oral hygeine  EPINEPHrine   1 mG/mL (1:1,000) Topical Solution 1 Application(s) Topical every 24 hours PRN for wound bleeding  LORazepam   Injectable 1 milliGRAM(s) IV Push <User Schedule> PRN Anxiety  ondansetron Injectable 4 milliGRAM(s) IV Push every 6 hours PRN Nausea and/or Vomiting      Vital Signs Last 24 Hrs  T(C): 36.8 (12 Jan 2024 01:15), Max: 36.8 (12 Jan 2024 01:15)  T(F): 98.2 (12 Jan 2024 01:15), Max: 98.2 (12 Jan 2024 01:15)  HR: 87 (12 Jan 2024 12:00) (79 - 98)  BP: 145/81 (12 Jan 2024 12:00) (112/56 - 145/81)  BP(mean): 107 (12 Jan 2024 12:00) (75 - 107)  RR: 26 (12 Jan 2024 12:00) (17 - 28)  SpO2: 99% (12 Jan 2024 12:00) (96% - 100%)    Parameters below as of 12 Jan 2024 12:00  Patient On (Oxygen Delivery Method): room air        Physical Exam  General: Patient is doing well and lying in bed comfortably  Constitutional: alert and awake, A&O x 4  Pulm: no respiratory distress  CV: Regular rate and rhythm, in and out of afib/flutter  Abd: soft, nontender, nondistended. No rebound, no guarding. Wound manager in place, no bleeding, no leaks  Extremities: warm, well perfused    I&O's Detail    11 Jan 2024 07:01  -  12 Jan 2024 07:00  --------------------------------------------------------  IN:    Fat Emulsion (Fish Oil &amp; Plant Based) 20% Infusion: 291.2 mL    Oral Fluid: 1200 mL    TPN (Total Parenteral Nutrition): 3426 mL  Total IN: 4917.2 mL    OUT:    Drain (mL): 1425 mL    Voided (mL): 1350 mL  Total OUT: 2775 mL    Total NET: 2142.2 mL      12 Jan 2024 07:01  -  12 Jan 2024 14:50  --------------------------------------------------------  IN:    Oral Fluid: 200 mL    TPN (Total Parenteral Nutrition): 214 mL  Total IN: 414 mL    OUT:    Drain (mL): 0 mL    Drain (mL): 300 mL    Voided (mL): 800 mL  Total OUT: 1100 mL    Total NET: -686 mL        LABS:                        8.6    10.29 )-----------( 192      ( 11 Jan 2024 05:30 )             27.5     01-11    137  |  104  |  41<H>  ----------------------------<  177<H>  4.6   |  24  |  1.38<H>    Ca    8.4      11 Jan 2024 05:30  Phos  4.4     01-11  Mg     2.2     01-11    TPro  8.2  /  Alb  2.6<L>  /  TBili  5.8<H>  /  DBili  3.8<H>  /  AST  110<H>  /  ALT  71<H>  /  AlkPhos  114  01-11      Urinalysis Basic - ( 11 Jan 2024 05:30 )    Color: x / Appearance: x / SG: x / pH: x  Gluc: 177 mg/dL / Ketone: x  / Bili: x / Urobili: x   Blood: x / Protein: x / Nitrite: x   Leuk Esterase: x / RBC: x / WBC x   Sq Epi: x / Non Sq Epi: x / Bacteria: x     STATUS POST:    6/27: Laparoscopic lysis of adhesions, converted to open lysis of adhesions, SBR x 3, temporary abdominal closure, 5L cryst, 1L 5% alb, 3 pRBC, 1 FFP, 1 plts  6/28: ex lap, removal of abthera, ileocolic resection, small bowel anastamosis, , 1.6 crystalloid, 250 5%, 175 uop   7/3: IR Gendel drained perihepatic aspiration of serous fluid.   7/5: RTOR exlap, washout, ileocolic resection with end ileostomy, blow hole colostomy, fistula, red rubber from ileostomy to small bowel anastomosis; vicryl bridging mesh; R JOYCE below ileostomy, L JOYCE at small bowel enterotomy repair; 500 LR, 500 5% albumin, 3u PRBC, 2 FFP, 400 UOP,   9/11: s/p perc cony.    SUBJECTIVE: Pt seen and examined at bedside this am by surgery team. Patient is lying comfortably in bed with no complaints. Tolerating diet, pain well controlled with current regimen. Patient denies fever, nausea, vomiting, chest pain, and shortness of breath. Ambulating daily.      SUBJECTIVE: Pt seen and examined at bedside this am by surgery team. Patient is lying comfortably in bed with no complaints. Tolerating diet, pain well controlled with current regimen. Patient denies fever, nausea, vomiting, chest pain, and shortness of breath.     MEDICATIONS  (STANDING):  chlorhexidine 2% Cloths 1 Application(s) Topical <User Schedule>  cholestyramine Powder (Sugar-Free) 4 Gram(s) Oral daily  enoxaparin Injectable 90 milliGRAM(s) SubCutaneous every 12 hours  epoetin matt-epbx (RETACRIT) Injectable 58259 Unit(s) SubCutaneous every 7 days  ergocalciferol 21924 Unit(s) Oral <User Schedule>  gabapentin 100 milliGRAM(s) Oral at bedtime  glucagon  Injectable 1 milliGRAM(s) IntraMuscular once  hydrALAZINE Injectable 10 milliGRAM(s) IV Push every 6 hours  levothyroxine Injectable 30 MICROGram(s) IV Push at bedtime  lipid, fat emulsion (Fish Oil and Plant Based) 20% Infusion 0.54 Gm/kG/Day (20.83 mL/Hr) IV Continuous <Continuous>  melatonin 5 milliGRAM(s) Oral at bedtime  metoprolol tartrate Injectable 10 milliGRAM(s) IV Push every 6 hours  Parenteral Nutrition - Adult 1 Each TPN Continuous <Continuous>  Parenteral Nutrition - Adult 1 Each TPN Continuous <Continuous>  timolol 0.5% Solution 1 Drop(s) Both EYES daily  ursodiol Suspension 300 milliGRAM(s) Oral every 8 hours  venlafaxine XR. 150 milliGRAM(s) Oral daily    MEDICATIONS  (PRN):  chlorhexidine 0.12% Liquid 15 milliLiter(s) Swish and Spit two times a day PRN oral hygeine  EPINEPHrine   1 mG/mL (1:1,000) Topical Solution 1 Application(s) Topical every 24 hours PRN for wound bleeding  LORazepam   Injectable 1 milliGRAM(s) IV Push <User Schedule> PRN Anxiety  ondansetron Injectable 4 milliGRAM(s) IV Push every 6 hours PRN Nausea and/or Vomiting      Vital Signs Last 24 Hrs  T(C): 36.8 (12 Jan 2024 01:15), Max: 36.8 (12 Jan 2024 01:15)  T(F): 98.2 (12 Jan 2024 01:15), Max: 98.2 (12 Jan 2024 01:15)  HR: 87 (12 Jan 2024 12:00) (79 - 98)  BP: 145/81 (12 Jan 2024 12:00) (112/56 - 145/81)  BP(mean): 107 (12 Jan 2024 12:00) (75 - 107)  RR: 26 (12 Jan 2024 12:00) (17 - 28)  SpO2: 99% (12 Jan 2024 12:00) (96% - 100%)    Parameters below as of 12 Jan 2024 12:00  Patient On (Oxygen Delivery Method): room air        Physical Exam  General: Patient is doing well and lying in bed comfortably  Constitutional: alert and awake, A&O x 4  Pulm: no respiratory distress  CV: Regular rate and rhythm, in and out of afib/flutter  Abd: soft, nontender, nondistended. No rebound, no guarding. Wound manager in place, no bleeding, no leaks  Extremities: warm, well perfused    I&O's Detail    11 Jan 2024 07:01  -  12 Jan 2024 07:00  --------------------------------------------------------  IN:    Fat Emulsion (Fish Oil &amp; Plant Based) 20% Infusion: 291.2 mL    Oral Fluid: 1200 mL    TPN (Total Parenteral Nutrition): 3426 mL  Total IN: 4917.2 mL    OUT:    Drain (mL): 1425 mL    Voided (mL): 1350 mL  Total OUT: 2775 mL    Total NET: 2142.2 mL      12 Jan 2024 07:01  -  12 Jan 2024 14:50  --------------------------------------------------------  IN:    Oral Fluid: 200 mL    TPN (Total Parenteral Nutrition): 214 mL  Total IN: 414 mL    OUT:    Drain (mL): 0 mL    Drain (mL): 300 mL    Voided (mL): 800 mL  Total OUT: 1100 mL    Total NET: -686 mL        LABS:                        8.6    10.29 )-----------( 192      ( 11 Jan 2024 05:30 )             27.5     01-11    137  |  104  |  41<H>  ----------------------------<  177<H>  4.6   |  24  |  1.38<H>    Ca    8.4      11 Jan 2024 05:30  Phos  4.4     01-11  Mg     2.2     01-11    TPro  8.2  /  Alb  2.6<L>  /  TBili  5.8<H>  /  DBili  3.8<H>  /  AST  110<H>  /  ALT  71<H>  /  AlkPhos  114  01-11      Urinalysis Basic - ( 11 Jan 2024 05:30 )    Color: x / Appearance: x / SG: x / pH: x  Gluc: 177 mg/dL / Ketone: x  / Bili: x / Urobili: x   Blood: x / Protein: x / Nitrite: x   Leuk Esterase: x / RBC: x / WBC x   Sq Epi: x / Non Sq Epi: x / Bacteria: x     STATUS POST:    6/27: Laparoscopic lysis of adhesions, converted to open lysis of adhesions, SBR x 3, temporary abdominal closure, 5L cryst, 1L 5% alb, 3 pRBC, 1 FFP, 1 plts  6/28: ex lap, removal of abthera, ileocolic resection, small bowel anastamosis, , 1.6 crystalloid, 250 5%, 175 uop   7/3: IR Gendel drained perihepatic aspiration of serous fluid.   7/5: RTOR exlap, washout, ileocolic resection with end ileostomy, blow hole colostomy, fistula, red rubber from ileostomy to small bowel anastomosis; vicryl bridging mesh; R JOYCE below ileostomy, L JOYCE at small bowel enterotomy repair; 500 LR, 500 5% albumin, 3u PRBC, 2 FFP, 400 UOP,   9/11: s/p perc cony.    SUBJECTIVE: Pt seen and examined at bedside this am by surgery team. Patient is lying comfortably in bed with no complaints. Tolerating diet, pain well controlled with current regimen. Patient denies fever, nausea, vomiting, chest pain, and shortness of breath. Ambulating daily.      SUBJECTIVE: Pt seen and examined at bedside this am by surgery team. Patient is lying comfortably in bed with no complaints. Tolerating diet, pain well controlled with current regimen. Patient denies fever, nausea, vomiting, chest pain, and shortness of breath.     MEDICATIONS  (STANDING):  chlorhexidine 2% Cloths 1 Application(s) Topical <User Schedule>  cholestyramine Powder (Sugar-Free) 4 Gram(s) Oral daily  enoxaparin Injectable 90 milliGRAM(s) SubCutaneous every 12 hours  epoetin matt-epbx (RETACRIT) Injectable 35701 Unit(s) SubCutaneous every 7 days  ergocalciferol 96167 Unit(s) Oral <User Schedule>  gabapentin 100 milliGRAM(s) Oral at bedtime  glucagon  Injectable 1 milliGRAM(s) IntraMuscular once  hydrALAZINE Injectable 10 milliGRAM(s) IV Push every 6 hours  levothyroxine Injectable 30 MICROGram(s) IV Push at bedtime  lipid, fat emulsion (Fish Oil and Plant Based) 20% Infusion 0.54 Gm/kG/Day (20.83 mL/Hr) IV Continuous <Continuous>  melatonin 5 milliGRAM(s) Oral at bedtime  metoprolol tartrate Injectable 10 milliGRAM(s) IV Push every 6 hours  Parenteral Nutrition - Adult 1 Each TPN Continuous <Continuous>  Parenteral Nutrition - Adult 1 Each TPN Continuous <Continuous>  timolol 0.5% Solution 1 Drop(s) Both EYES daily  ursodiol Suspension 300 milliGRAM(s) Oral every 8 hours  venlafaxine XR. 150 milliGRAM(s) Oral daily    MEDICATIONS  (PRN):  chlorhexidine 0.12% Liquid 15 milliLiter(s) Swish and Spit two times a day PRN oral hygeine  EPINEPHrine   1 mG/mL (1:1,000) Topical Solution 1 Application(s) Topical every 24 hours PRN for wound bleeding  LORazepam   Injectable 1 milliGRAM(s) IV Push <User Schedule> PRN Anxiety  ondansetron Injectable 4 milliGRAM(s) IV Push every 6 hours PRN Nausea and/or Vomiting      Vital Signs Last 24 Hrs  T(C): 36.8 (12 Jan 2024 01:15), Max: 36.8 (12 Jan 2024 01:15)  T(F): 98.2 (12 Jan 2024 01:15), Max: 98.2 (12 Jan 2024 01:15)  HR: 87 (12 Jan 2024 12:00) (79 - 98)  BP: 145/81 (12 Jan 2024 12:00) (112/56 - 145/81)  BP(mean): 107 (12 Jan 2024 12:00) (75 - 107)  RR: 26 (12 Jan 2024 12:00) (17 - 28)  SpO2: 99% (12 Jan 2024 12:00) (96% - 100%)    Parameters below as of 12 Jan 2024 12:00  Patient On (Oxygen Delivery Method): room air        Physical Exam  General: Patient is doing well and lying in bed comfortably  Constitutional: alert and awake, A&O x 4  Pulm: no respiratory distress  CV: Regular rate and rhythm, in and out of afib/flutter  Abd: soft, nontender, nondistended. No rebound, no guarding. Wound manager in place, no bleeding, no leaks  Extremities: warm, well perfused    I&O's Detail    11 Jan 2024 07:01  -  12 Jan 2024 07:00  --------------------------------------------------------  IN:    Fat Emulsion (Fish Oil &amp; Plant Based) 20% Infusion: 291.2 mL    Oral Fluid: 1200 mL    TPN (Total Parenteral Nutrition): 3426 mL  Total IN: 4917.2 mL    OUT:    Drain (mL): 1425 mL    Voided (mL): 1350 mL  Total OUT: 2775 mL    Total NET: 2142.2 mL      12 Jan 2024 07:01  -  12 Jan 2024 14:50  --------------------------------------------------------  IN:    Oral Fluid: 200 mL    TPN (Total Parenteral Nutrition): 214 mL  Total IN: 414 mL    OUT:    Drain (mL): 0 mL    Drain (mL): 300 mL    Voided (mL): 800 mL  Total OUT: 1100 mL    Total NET: -686 mL        LABS:                        8.6    10.29 )-----------( 192      ( 11 Jan 2024 05:30 )             27.5     01-11    137  |  104  |  41<H>  ----------------------------<  177<H>  4.6   |  24  |  1.38<H>    Ca    8.4      11 Jan 2024 05:30  Phos  4.4     01-11  Mg     2.2     01-11    TPro  8.2  /  Alb  2.6<L>  /  TBili  5.8<H>  /  DBili  3.8<H>  /  AST  110<H>  /  ALT  71<H>  /  AlkPhos  114  01-11      Urinalysis Basic - ( 11 Jan 2024 05:30 )    Color: x / Appearance: x / SG: x / pH: x  Gluc: 177 mg/dL / Ketone: x  / Bili: x / Urobili: x   Blood: x / Protein: x / Nitrite: x   Leuk Esterase: x / RBC: x / WBC x   Sq Epi: x / Non Sq Epi: x / Bacteria: x

## 2024-01-12 NOTE — PROGRESS NOTE ADULT - ASSESSMENT
A/p: 78M w PMH Crohn's, AFib/Flutter s/p DCCVs on amiodarone, remote ileocectomy and open appendectomy. Admitted (6/23) for SBO vs Crohns flare, s/p NGT decompression and s/p lap converted to open TRE, SBR x 3, left in discontinuity with abthera vac on (6/27), RTOR for ileocolic resection, small bowel anastomosis, and abdominal wall closure on (6/28), c/b fluid collection s/p IR aspiration of perihepatic fluid on (7/3), c/b wound dehiscence s/p RTOR exlap, washout, ileocolic resection with end ileostomy, blow hole colostomy, red rubber from ileostomy to small bowel anastomosis; vicryl bridging mesh on (7/5) transferred to SICU postoperatively for hemodynamic monitoring, with hospital course complicated by periods of severe ELVI and hyponatremia, which resolved but stepped back up for to SICU on (9/10) for acute AMS, intermittent hypoglycemia, AFib with RVR. Percutaneous Cholecystostomy placed on (9/11) for enlarged GB, PCT capped 10/5 and uncapped 10/25 for hyperbilirubinemia, Right brachial DVT, left basillic and cephalic superficial thrombus on duplex 11/2, CT 11/14 with ALDEN ground glass opacity of unclear etiology, completed empiric 7day cefepime course 11/22, rising T-bili of unclear etilogy, now w resolved candida glabrata fungemia, ELVI improving.     NEURO: Hx of depression: cont Effexor (halved dose in setting of renal dysfunx). Anxiety: Lorazepam PRN. Holistic consult for anxiety and insomnia. Gabapentin for restless leg syndrome.   HEENT: Timolol added on 1/10 for additional systemic BB support in setting of mal absorption  CV: Hx of Afib and recent Aflutter alternating with sinus tachycardia (HR 100s),: Cont Lopressor to 10 q6, added timolol ggt for attempted Systemic affect. Started therapeutic lovenox on 1/10.  s/p previous DCCV, off Amio and lipitor due to persistent transaminitis.  BLE duplex (1/5) no DVT, TTE  (7/18) - PASP 64mmHg, EF 65-70%. Holding Losartan & norvasc because not absorbing PO meds, HTN: hydralazine 10 q6  Standing. TTE (1/4) LVEF 75%, mild/mod AR, PASP 40, RVEF wnl. Given Increased fluid in TPN will watch I/O over the next few days.   PULM: Atelectasis/dyspnea improved clinically: cont 1 hr of BiPAP every 12hrs and nasal canula or room air. Encourage OOB and IS. Completed 7d empiric cefepime 11/22 to cover for potential PNA.   GI/FEN: Low res, low fat, high protein diet. Cont Ensure max x1/day however pt likely not absorbing adequately due to high fistula. Folate, thiamine. PICC replaced 12/4, switched out PICC on 1/5. Continue TPN/lipids 1L extra in TPN, net +2L without signs of overload. 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 Cont Octreotide & Cholestyramine 10/18. Transaminitis slowly improved elevated T bili, s/p Perc Deb on 9/11, repeat capping trial 1/2, no elevation in Tbili to date; seen by Hepatology - MRCP 10/30 no obstruction, cont ursodiol, RUQ US 11/25 CBD 5mm, hepatic vessels patent. Cont Vit d weekly.   : Voids. ELVI improved: stopped famotidine given renal dysfunction. MARLO Duplex and RP US unremarkable, CK wnl.    ENDO: Hx hypothyroid: IV Synthroid, repeat thyroid studies WNL 1/3/24.   ID: currently not on any abx/antifungals. BCx 11/22 grew candida glabrata, likely CLABSI. s/p Caspo & completed Fluconazole course (12/1-12/9). Ophthalmology evaluated - normal ocular exam. Echo no vegetation. PICC replaced on 12/4. Cont Nystatin powder. // DC: caspofungin (11/24-12/1). empiric 7days cefepime (11/15-11/22), C. tertium, Lactobacillus from IR cx 7/3, and candida albicans, lactobacillus, vanc sensitive E faecium, vanc resistant E gallinarum, vanc resistant E casseliflavus, lactobacillus from OR cx 7/5. Completed course of abx with Imipenem (9/10-9/15). Imipenem (8/26--) imipenem (6/30-7/12, 7/23-7/30), Dapto (6/30-7/5 and 7/23-7/24). Empiric Dapto (8/23-24) and Cefepime (8/23-24).   PPX: SCDs, Cont Lovenox therapeutic for Afib  HEME: Anemia - continue Epogen weekly. S/p Iron Sucrose 200 qd x 3 days for chronic anemia.   LINES: PIVs, New RUE PICC placed (1/5--) will plan to switch PICC once a month secondary to history of fungemia. // DC: LUE PICC (9/1-11/1 ), RUE PICC: (11/1-11/24), LUE PICC (12/4-1/5)     WOUNDS/DRAINS: Abdominal wound and fistula with wound manager in place dressing change daily. Had recurrent punctate bleeding at wound bed, s/p placed surgicel, attempted to stitch, electrocautery and epi soaked gauze. Epinephrine soaked gauze at bedside RLQ Ostomy. IR perc deb tube capped 1/2. // DC: L Clay drain.  PT: Ambulate as tolerated OOB to chair daily.  DISPO: SICU due to complicated hospital course. Goal to dc home in 2weeks: Spoke with case management and wound care on 1/10 to alert them of possible discharge home with services the week of 1/22 vs 1/29.

## 2024-01-13 NOTE — PROGRESS NOTE ADULT - SUBJECTIVE AND OBJECTIVE BOX
Patient evaluated with chief resident during AM rounds    STATUS POST:  Exploratory laparotomy    Laparoscopic lysis of intestinal adhesions    Exploratory laparotomy    Open lysis of intestinal adhesions    Resection of small bowel    Temporary closure of abdominal cavity    Repair, anastomosis, ileocolic    Small bowel resection with anastomosis    Flap, myocutaneous, abdominal wall    Reconstruction of abdominal wall using mesh    Washout of abdominal cavity    Creation of ileostomy    Creation of colostomy    Laparoscopic lysis of intestinal adhesions    Open lysis of intestinal adhesions    Resection of small bowel    Temporary closure of abdominal cavity    Repair, anastomosis, ileocolic    Small bowel resection with anastomosis    Flap, myocutaneous, abdominal wall    Reconstruction of abdominal wall using mesh    Washout of abdominal cavity    SUBJECTIVE: Dr. LUNDBERG seen in chair. Discussed how his day has been. States he is comfortable, tolerating diet. Reading novel on [employer] presidency. Discussed urology with resident for a moment, has son and wife likely coming to visit today. No Nausea/vomiting. No pain.     Denies Chest Pain, Difficulty breathing, Calf Pain, Headache, Dizziness    OBJECTIVE:  Vital Signs Last 24 Hrs  T(C): 36.8 (13 Jan 2024 08:44), Max: 36.8 (12 Jan 2024 22:14)  T(F): 98.2 (13 Jan 2024 08:44), Max: 98.3 (12 Jan 2024 22:14)  HR: 98 (13 Jan 2024 11:00) (87 - 107)  BP: 133/61 (13 Jan 2024 11:00) (109/57 - 148/69)  BP(mean): 88 (13 Jan 2024 11:00) (76 - 100)  RR: 32 (13 Jan 2024 11:00) (20 - 36)  SpO2: 95% (13 Jan 2024 11:00) (93% - 100%)    Parameters below as of 13 Jan 2024 11:00  Patient On (Oxygen Delivery Method): room air        I&O's Summary    12 Jan 2024 07:01  -  13 Jan 2024 07:00  --------------------------------------------------------  IN: 4893.2 mL / OUT: 3250 mL / NET: 1643.2 mL    13 Jan 2024 07:01  -  13 Jan 2024 12:12  --------------------------------------------------------  IN: 478 mL / OUT: 775 mL / NET: -297 mL        Physical Exam:  General Appearance: Appears well, NAD  Pulmonary: Nonlabored breathing, no respiratory distress  Cardiovascular: NSR  Abdomen: Soft, nondistneded,non-tender. Ostomy patent with greenish output. Right pouch still with minimal output  Extremities: WWP, SCD's in place     LABS:                        8.9    11.46 )-----------( 186      ( 13 Jan 2024 05:06 )             28.8     01-13    137  |  104  |  43<H>  ----------------------------<  149<H>  4.2   |  27  |  1.31<H>    Ca    8.2<L>      13 Jan 2024 05:57  Phos  3.3     01-13  Mg     2.2     01-13    TPro  8.1  /  Alb  2.5<L>  /  TBili  5.3<H>  /  DBili  3.6<H>  /  AST  106<H>  /  ALT  70<H>  /  AlkPhos  103  01-13      Urinalysis Basic - ( 13 Jan 2024 05:57 )    Color: x / Appearance: x / SG: x / pH: x  Gluc: 149 mg/dL / Ketone: x  / Bili: x / Urobili: x   Blood: x / Protein: x / Nitrite: x   Leuk Esterase: x / RBC: x / WBC x   Sq Epi: x / Non Sq Epi: x / Bacteria: x

## 2024-01-13 NOTE — PROGRESS NOTE ADULT - ASSESSMENT
78 year old male with a PMHx of Crohn's, AFib/Flutter s/p DCCVs on amiodarone, remote ileocectomy and open appendectomy. Admitted (6/23) for SBO vs Crohns flare, s/p NGT decompression and s/p lap converted to open TRE, SBR x 3, left in discontinuity with abthera vac on (6/27), RTOR for ileocolic resection, small bowel anastomosis, and abdominal wall closure on (6/28), c/b fluid collection s/p IR aspiration of perihepatic fluid on (7/3), c/b wound dehiscence s/p RTOR exlap, washout, ileocolic resection with end ileostomy, blow hole colostomy, red rubber from ileostomy to small bowel anastomosis; vicryl bridging mesh on (7/5) transferred to SICU postoperatively for hemodynamic monitoring, with hospital course complicated by periods of severe ELVI and hyponatremia, which resolved but stepped back up for to SICU on (9/10) for acute AMS, intermittent hypoglycemia, AFib with RVR. Percutaneous Cholecystostomy placed on (9/11) for enlarged GB, PCT capped 10/5 and uncapped 10/25 for hyperbilirubinemia, Right brachial DVT, left basillic and cephalic superficial thrombus on duplex 11/2, CT 11/14 with ALDEN ground glass opacity of unclear etiology, completed empiric 7day cefepime course 11/22, rising T-bili of unclear etiology now w resolved candida glabrata fungemia, ELVI improving. Has remained in SICU for monitoring.     Continue excellent care per SICU team and team 5 surgery

## 2024-01-13 NOTE — PROGRESS NOTE ADULT - ATTENDING COMMENTS
AF, UC, s/p SBR with ileostomy, enteroatmospheric fistula  physical as above  continue TPN  exploring gattux  bilirubin downtrending  continue metoprolol and lovenox

## 2024-01-13 NOTE — PROGRESS NOTE ADULT - ASSESSMENT
78 year old male with a PMHx of Crohn's, AFib/Flutter s/p DCCVs on amiodarone, remote ileocectomy and open appendectomy. Admitted (6/23) for SBO vs Crohns flare, s/p NGT decompression and s/p lap converted to open TRE, SBR x 3, left in discontinuity with abthera vac on (6/27), RTOR for ileocolic resection, small bowel anastomosis, and abdominal wall closure on (6/28), c/b fluid collection s/p IR aspiration of perihepatic fluid on (7/3), c/b wound dehiscence s/p RTOR exlap, washout, ileocolic resection with end ileostomy, blow hole colostomy, red rubber from ileostomy to small bowel anastomosis; vicryl bridging mesh on (7/5) transferred to SICU postoperatively for hemodynamic monitoring, with hospital course complicated by periods of severe ELVI and hyponatremia, which resolved but stepped back up for to SICU on (9/10) for acute AMS, intermittent hypoglycemia, AFib with RVR. Percutaneous Cholecystostomy placed on (9/11) for enlarged GB, PCT capped 10/5 and uncapped 10/25 for hyperbilirubinemia, Right brachial DVT, left basillic and cephalic superficial thrombus on duplex 11/2, CT 11/14 with ALDEN ground glass opacity of unclear etiology, completed empiric 7day cefepime course 11/22, rising T-bili of unclear etiology now w resolved candida glabrata fungemia, ELVI improving.     - Continue regular diet and TPN   - Monitor I&Os   - Continue wound care   - Out of bed and ambulation   - Team 5 will follow    Plans to be discussed with attending and chief resident for finalization.  78 year old male with a PMHx of Crohn's, AFib/Flutter s/p DCCVs on amiodarone, remote ileocectomy and open appendectomy. Admitted (6/23) for SBO vs Crohns flare, s/p NGT decompression and s/p lap converted to open TRE, SBR x 3, left in discontinuity with abthera vac on (6/27), RTOR for ileocolic resection, small bowel anastomosis, and abdominal wall closure on (6/28), c/b fluid collection s/p IR aspiration of perihepatic fluid on (7/3), c/b wound dehiscence s/p RTOR exlap, washout, ileocolic resection with end ileostomy, blow hole colostomy, red rubber from ileostomy to small bowel anastomosis; vicryl bridging mesh on (7/5) transferred to SICU postoperatively for hemodynamic monitoring, with hospital course complicated by periods of severe ELVI and hyponatremia, which resolved but stepped back up for to SICU on (9/10) for acute AMS, intermittent hypoglycemia, AFib with RVR. Percutaneous Cholecystostomy placed on (9/11) for enlarged GB, PCT capped 10/5 and uncapped 10/25 for hyperbilirubinemia, Right brachial DVT, left basillic and cephalic superficial thrombus on duplex 11/2, CT 11/14 with ALDEN ground glass opacity of unclear etiology, completed empiric 7day cefepime course 11/22, rising T-bili of unclear etiology now w resolved candida glabrata fungemia, ELVI improving.     - Continue regular diet and TPN   - Monitor I&Os   - Gattex   - Continue wound care   - Out of bed and ambulation   - Team 5 will follow    Plans to be discussed with attending and chief resident for finalization.

## 2024-01-13 NOTE — PROGRESS NOTE ADULT - SUBJECTIVE AND OBJECTIVE BOX
ON: AMRITA      SUBJECTIVE: Pt seen and examined at bedside this am by ICU team. Patient is lying comfortably in bed with no complaints. Tolerating diet, denies pain. Patient denies fever, nausea, vomiting, chest pain, and shortness of breath. Feels less dehydrated than earlier this week.       MEDICATIONS  (STANDING):  chlorhexidine 2% Cloths 1 Application(s) Topical <User Schedule>  cholestyramine Powder (Sugar-Free) 4 Gram(s) Oral daily  enoxaparin Injectable 90 milliGRAM(s) SubCutaneous every 12 hours  epoetin matt-epbx (RETACRIT) Injectable 06794 Unit(s) SubCutaneous every 7 days  ergocalciferol 00113 Unit(s) Oral <User Schedule>  gabapentin 100 milliGRAM(s) Oral at bedtime  glucagon  Injectable 1 milliGRAM(s) IntraMuscular once  hydrALAZINE Injectable 10 milliGRAM(s) IV Push every 6 hours  levothyroxine Injectable 30 MICROGram(s) IV Push at bedtime  lipid, fat emulsion (Fish Oil and Plant Based) 20% Infusion 0.54 Gm/kG/Day (20.83 mL/Hr) IV Continuous <Continuous>  melatonin 5 milliGRAM(s) Oral at bedtime  metoprolol tartrate Injectable 10 milliGRAM(s) IV Push every 6 hours  Parenteral Nutrition - Adult 1 Each TPN Continuous <Continuous>  timolol 0.5% Solution 1 Drop(s) Both EYES daily  ursodiol Suspension 300 milliGRAM(s) Oral every 8 hours  venlafaxine XR. 150 milliGRAM(s) Oral daily    MEDICATIONS  (PRN):  chlorhexidine 0.12% Liquid 15 milliLiter(s) Swish and Spit two times a day PRN oral hygeine  EPINEPHrine   1 mG/mL (1:1,000) Topical Solution 1 Application(s) Topical every 24 hours PRN for wound bleeding  LORazepam   Injectable 1 milliGRAM(s) IV Push <User Schedule> PRN Anxiety  ondansetron Injectable 4 milliGRAM(s) IV Push every 6 hours PRN Nausea and/or Vomiting      Drips:     ICU Vital Signs Last 24 Hrs  T(C): 36.8 (13 Jan 2024 08:44), Max: 36.8 (12 Jan 2024 22:14)  T(F): 98.2 (13 Jan 2024 08:44), Max: 98.3 (12 Jan 2024 22:14)  HR: 106 (13 Jan 2024 08:55) (80 - 107)  BP: 145/64 (13 Jan 2024 08:30) (109/57 - 148/69)  BP(mean): 92 (13 Jan 2024 08:30) (76 - 107)  ABP: --  ABP(mean): --  RR: 20 (13 Jan 2024 08:55) (20 - 36)  SpO2: 96% (13 Jan 2024 08:55) (93% - 100%)    O2 Parameters below as of 13 Jan 2024 08:55  Patient On (Oxygen Delivery Method): room air        Physical Exam:  General: NAD  HEENT: NC/AT, EOMI, PERRLA, normal hearing, no oral lesions, neck supple w/o LAD  Pulmonary: Nonlabored breathing, no respiratory distress, CTA-B  Cardiovascular: normal rate, no murmurs  Abdominal: soft, NT/ND, wound manager in place with dark green output. No leaks, no bleeding.   Extremities: WWP, no clubbing/cyanosis/edema  Neuro: A/O x3, CNs II-XII grossly intact, normal motor/sensation, no focal deficits  Pulses: palpable distal pulses    Vent settings:      I&O's Summary    12 Jan 2024 07:01  -  13 Jan 2024 07:00  --------------------------------------------------------  IN: 4893.2 mL / OUT: 3250 mL / NET: 1643.2 mL    13 Jan 2024 07:01  -  13 Jan 2024 09:58  --------------------------------------------------------  IN: 264 mL / OUT: 175 mL / NET: 89 mL        LABS:                        8.9    11.46 )-----------( 186      ( 13 Jan 2024 05:06 )             28.8     01-13    137  |  104  |  43<H>  ----------------------------<  149<H>  4.2   |  27  |  1.31<H>    Ca    8.2<L>      13 Jan 2024 05:57  Phos  3.3     01-13  Mg     2.2     01-13    TPro  8.1  /  Alb  2.5<L>  /  TBili  5.3<H>  /  DBili  3.6<H>  /  AST  106<H>  /  ALT  70<H>  /  AlkPhos  103  01-13      Urinalysis Basic - ( 13 Jan 2024 05:57 )    Color: x / Appearance: x / SG: x / pH: x  Gluc: 149 mg/dL / Ketone: x  / Bili: x / Urobili: x   Blood: x / Protein: x / Nitrite: x   Leuk Esterase: x / RBC: x / WBC x   Sq Epi: x / Non Sq Epi: x / Bacteria: x      CAPILLARY BLOOD GLUCOSE        LIVER FUNCTIONS - ( 13 Jan 2024 05:57 )  Alb: 2.5 g/dL / Pro: 8.1 g/dL / ALK PHOS: 103 U/L / ALT: 70 U/L / AST: 106 U/L / GGT: x             Cultures:    RADIOLOGY & ADDITIONAL STUDIES:     ON: AMRITA      SUBJECTIVE: Pt seen and examined at bedside this am by ICU team. Patient is lying comfortably in bed with no complaints. Tolerating diet, denies pain. Patient denies fever, nausea, vomiting, chest pain, and shortness of breath. Feels less dehydrated than earlier this week.       MEDICATIONS  (STANDING):  chlorhexidine 2% Cloths 1 Application(s) Topical <User Schedule>  cholestyramine Powder (Sugar-Free) 4 Gram(s) Oral daily  enoxaparin Injectable 90 milliGRAM(s) SubCutaneous every 12 hours  epoetin matt-epbx (RETACRIT) Injectable 08130 Unit(s) SubCutaneous every 7 days  ergocalciferol 72717 Unit(s) Oral <User Schedule>  gabapentin 100 milliGRAM(s) Oral at bedtime  glucagon  Injectable 1 milliGRAM(s) IntraMuscular once  hydrALAZINE Injectable 10 milliGRAM(s) IV Push every 6 hours  levothyroxine Injectable 30 MICROGram(s) IV Push at bedtime  lipid, fat emulsion (Fish Oil and Plant Based) 20% Infusion 0.54 Gm/kG/Day (20.83 mL/Hr) IV Continuous <Continuous>  melatonin 5 milliGRAM(s) Oral at bedtime  metoprolol tartrate Injectable 10 milliGRAM(s) IV Push every 6 hours  Parenteral Nutrition - Adult 1 Each TPN Continuous <Continuous>  timolol 0.5% Solution 1 Drop(s) Both EYES daily  ursodiol Suspension 300 milliGRAM(s) Oral every 8 hours  venlafaxine XR. 150 milliGRAM(s) Oral daily    MEDICATIONS  (PRN):  chlorhexidine 0.12% Liquid 15 milliLiter(s) Swish and Spit two times a day PRN oral hygeine  EPINEPHrine   1 mG/mL (1:1,000) Topical Solution 1 Application(s) Topical every 24 hours PRN for wound bleeding  LORazepam   Injectable 1 milliGRAM(s) IV Push <User Schedule> PRN Anxiety  ondansetron Injectable 4 milliGRAM(s) IV Push every 6 hours PRN Nausea and/or Vomiting      Drips:     ICU Vital Signs Last 24 Hrs  T(C): 36.8 (13 Jan 2024 08:44), Max: 36.8 (12 Jan 2024 22:14)  T(F): 98.2 (13 Jan 2024 08:44), Max: 98.3 (12 Jan 2024 22:14)  HR: 106 (13 Jan 2024 08:55) (80 - 107)  BP: 145/64 (13 Jan 2024 08:30) (109/57 - 148/69)  BP(mean): 92 (13 Jan 2024 08:30) (76 - 107)  ABP: --  ABP(mean): --  RR: 20 (13 Jan 2024 08:55) (20 - 36)  SpO2: 96% (13 Jan 2024 08:55) (93% - 100%)    O2 Parameters below as of 13 Jan 2024 08:55  Patient On (Oxygen Delivery Method): room air        Physical Exam:  General: NAD  HEENT: NC/AT, EOMI, PERRLA, normal hearing, no oral lesions, neck supple w/o LAD  Pulmonary: Nonlabored breathing, no respiratory distress, CTA-B  Cardiovascular: normal rate, no murmurs  Abdominal: soft, NT/ND, wound manager in place with dark green output. No leaks, no bleeding.   Extremities: WWP, no clubbing/cyanosis/edema  Neuro: A/O x3, CNs II-XII grossly intact, normal motor/sensation, no focal deficits  Pulses: palpable distal pulses    Vent settings:      I&O's Summary    12 Jan 2024 07:01  -  13 Jan 2024 07:00  --------------------------------------------------------  IN: 4893.2 mL / OUT: 3250 mL / NET: 1643.2 mL    13 Jan 2024 07:01  -  13 Jan 2024 09:58  --------------------------------------------------------  IN: 264 mL / OUT: 175 mL / NET: 89 mL        LABS:                        8.9    11.46 )-----------( 186      ( 13 Jan 2024 05:06 )             28.8     01-13    137  |  104  |  43<H>  ----------------------------<  149<H>  4.2   |  27  |  1.31<H>    Ca    8.2<L>      13 Jan 2024 05:57  Phos  3.3     01-13  Mg     2.2     01-13    TPro  8.1  /  Alb  2.5<L>  /  TBili  5.3<H>  /  DBili  3.6<H>  /  AST  106<H>  /  ALT  70<H>  /  AlkPhos  103  01-13      Urinalysis Basic - ( 13 Jan 2024 05:57 )    Color: x / Appearance: x / SG: x / pH: x  Gluc: 149 mg/dL / Ketone: x  / Bili: x / Urobili: x   Blood: x / Protein: x / Nitrite: x   Leuk Esterase: x / RBC: x / WBC x   Sq Epi: x / Non Sq Epi: x / Bacteria: x      CAPILLARY BLOOD GLUCOSE        LIVER FUNCTIONS - ( 13 Jan 2024 05:57 )  Alb: 2.5 g/dL / Pro: 8.1 g/dL / ALK PHOS: 103 U/L / ALT: 70 U/L / AST: 106 U/L / GGT: x             Cultures:    RADIOLOGY & ADDITIONAL STUDIES:

## 2024-01-13 NOTE — PROGRESS NOTE ADULT - SUBJECTIVE AND OBJECTIVE BOX
Overnight events:       POD#    SUBJECTIVE:      MEDICATIONS  (STANDING):  chlorhexidine 2% Cloths 1 Application(s) Topical <User Schedule>  cholestyramine Powder (Sugar-Free) 4 Gram(s) Oral daily  enoxaparin Injectable 90 milliGRAM(s) SubCutaneous every 12 hours  epoetin matt-epbx (RETACRIT) Injectable 09721 Unit(s) SubCutaneous every 7 days  ergocalciferol 65455 Unit(s) Oral <User Schedule>  gabapentin 100 milliGRAM(s) Oral at bedtime  glucagon  Injectable 1 milliGRAM(s) IntraMuscular once  hydrALAZINE Injectable 10 milliGRAM(s) IV Push every 6 hours  levothyroxine Injectable 30 MICROGram(s) IV Push at bedtime  lipid, fat emulsion (Fish Oil and Plant Based) 20% Infusion 0.54 Gm/kG/Day (20.83 mL/Hr) IV Continuous <Continuous>  melatonin 5 milliGRAM(s) Oral at bedtime  metoprolol tartrate Injectable 10 milliGRAM(s) IV Push every 6 hours  Parenteral Nutrition - Adult 1 Each TPN Continuous <Continuous>  timolol 0.5% Solution 1 Drop(s) Both EYES daily  ursodiol Suspension 300 milliGRAM(s) Oral every 8 hours  venlafaxine XR. 150 milliGRAM(s) Oral daily    MEDICATIONS  (PRN):  chlorhexidine 0.12% Liquid 15 milliLiter(s) Swish and Spit two times a day PRN oral hygeine  EPINEPHrine   1 mG/mL (1:1,000) Topical Solution 1 Application(s) Topical every 24 hours PRN for wound bleeding  LORazepam   Injectable 1 milliGRAM(s) IV Push <User Schedule> PRN Anxiety  ondansetron Injectable 4 milliGRAM(s) IV Push every 6 hours PRN Nausea and/or Vomiting      Vital Signs Last 24 Hrs  T(C): 36.8 (12 Jan 2024 22:14), Max: 36.8 (12 Jan 2024 22:14)  T(F): 98.3 (12 Jan 2024 22:14), Max: 98.3 (12 Jan 2024 22:14)  HR: 89 (13 Jan 2024 04:30) (80 - 107)  BP: 110/55 (13 Jan 2024 04:30) (110/55 - 148/69)  BP(mean): 77 (13 Jan 2024 04:30) (76 - 107)  RR: 21 (13 Jan 2024 04:30) (17 - 36)  SpO2: 100% (13 Jan 2024 04:30) (96% - 100%)    Parameters below as of 13 Jan 2024 04:30  Patient On (Oxygen Delivery Method): BiPAP/CPAP        Physical Exam:  General: NAD, resting comfortably in bed  Pulmonary: Nonlabored breathing, no respiratory distress  Cardiovascular: NSR  Abdominal: soft, NT/ND  Extremities: WWP, normal strength  Neuro: A/O x 3, CNs II-XII grossly intact, no focal deficits, normal motor/sensation  Pulses: palpable distal pulses    I&O's Summary    11 Jan 2024 07:01  -  12 Jan 2024 07:00  --------------------------------------------------------  IN: 4917.2 mL / OUT: 2775 mL / NET: 2142.2 mL    12 Jan 2024 07:01  -  13 Jan 2024 06:02  --------------------------------------------------------  IN: 4204.4 mL / OUT: 2975 mL / NET: 1229.4 mL        LABS:                        8.9    11.46 )-----------( 186      ( 13 Jan 2024 05:06 )             28.8     01-13    135  |  102  |  40<H>  ----------------------------<  134<H>  see note   |  26  |  1.32<H>    Ca    7.9<L>      13 Jan 2024 05:06  Phos  3.6     01-13  Mg     2.1     01-13    TPro  8.2  /  Alb  2.2<L>  /  TBili  5.5<H>  /  DBili  see note  /  AST  see note  /  ALT  see note  /  AlkPhos  104  01-13      Urinalysis Basic - ( 13 Jan 2024 05:06 )    Color: x / Appearance: x / SG: x / pH: x  Gluc: 134 mg/dL / Ketone: x  / Bili: x / Urobili: x   Blood: x / Protein: x / Nitrite: x   Leuk Esterase: x / RBC: x / WBC x   Sq Epi: x / Non Sq Epi: x / Bacteria: x      CAPILLARY BLOOD GLUCOSE        LIVER FUNCTIONS - ( 13 Jan 2024 05:06 )  Alb: 2.2 g/dL / Pro: 8.2 g/dL / ALK PHOS: 104 U/L / ALT: see note U/L / AST: see note U/L / GGT: x             RADIOLOGY & ADDITIONAL STUDIES:   Overnight events:       POD#    SUBJECTIVE:      MEDICATIONS  (STANDING):  chlorhexidine 2% Cloths 1 Application(s) Topical <User Schedule>  cholestyramine Powder (Sugar-Free) 4 Gram(s) Oral daily  enoxaparin Injectable 90 milliGRAM(s) SubCutaneous every 12 hours  epoetin matt-epbx (RETACRIT) Injectable 63852 Unit(s) SubCutaneous every 7 days  ergocalciferol 18719 Unit(s) Oral <User Schedule>  gabapentin 100 milliGRAM(s) Oral at bedtime  glucagon  Injectable 1 milliGRAM(s) IntraMuscular once  hydrALAZINE Injectable 10 milliGRAM(s) IV Push every 6 hours  levothyroxine Injectable 30 MICROGram(s) IV Push at bedtime  lipid, fat emulsion (Fish Oil and Plant Based) 20% Infusion 0.54 Gm/kG/Day (20.83 mL/Hr) IV Continuous <Continuous>  melatonin 5 milliGRAM(s) Oral at bedtime  metoprolol tartrate Injectable 10 milliGRAM(s) IV Push every 6 hours  Parenteral Nutrition - Adult 1 Each TPN Continuous <Continuous>  timolol 0.5% Solution 1 Drop(s) Both EYES daily  ursodiol Suspension 300 milliGRAM(s) Oral every 8 hours  venlafaxine XR. 150 milliGRAM(s) Oral daily    MEDICATIONS  (PRN):  chlorhexidine 0.12% Liquid 15 milliLiter(s) Swish and Spit two times a day PRN oral hygeine  EPINEPHrine   1 mG/mL (1:1,000) Topical Solution 1 Application(s) Topical every 24 hours PRN for wound bleeding  LORazepam   Injectable 1 milliGRAM(s) IV Push <User Schedule> PRN Anxiety  ondansetron Injectable 4 milliGRAM(s) IV Push every 6 hours PRN Nausea and/or Vomiting      Vital Signs Last 24 Hrs  T(C): 36.8 (12 Jan 2024 22:14), Max: 36.8 (12 Jan 2024 22:14)  T(F): 98.3 (12 Jan 2024 22:14), Max: 98.3 (12 Jan 2024 22:14)  HR: 89 (13 Jan 2024 04:30) (80 - 107)  BP: 110/55 (13 Jan 2024 04:30) (110/55 - 148/69)  BP(mean): 77 (13 Jan 2024 04:30) (76 - 107)  RR: 21 (13 Jan 2024 04:30) (17 - 36)  SpO2: 100% (13 Jan 2024 04:30) (96% - 100%)    Parameters below as of 13 Jan 2024 04:30  Patient On (Oxygen Delivery Method): BiPAP/CPAP        Physical Exam:  General: NAD, resting comfortably in bed  Pulmonary: Nonlabored breathing, no respiratory distress  Cardiovascular: NSR  Abdominal: soft, NT/ND  Extremities: WWP, normal strength  Neuro: A/O x 3, CNs II-XII grossly intact, no focal deficits, normal motor/sensation  Pulses: palpable distal pulses    I&O's Summary    11 Jan 2024 07:01  -  12 Jan 2024 07:00  --------------------------------------------------------  IN: 4917.2 mL / OUT: 2775 mL / NET: 2142.2 mL    12 Jan 2024 07:01  -  13 Jan 2024 06:02  --------------------------------------------------------  IN: 4204.4 mL / OUT: 2975 mL / NET: 1229.4 mL        LABS:                        8.9    11.46 )-----------( 186      ( 13 Jan 2024 05:06 )             28.8     01-13    135  |  102  |  40<H>  ----------------------------<  134<H>  see note   |  26  |  1.32<H>    Ca    7.9<L>      13 Jan 2024 05:06  Phos  3.6     01-13  Mg     2.1     01-13    TPro  8.2  /  Alb  2.2<L>  /  TBili  5.5<H>  /  DBili  see note  /  AST  see note  /  ALT  see note  /  AlkPhos  104  01-13      Urinalysis Basic - ( 13 Jan 2024 05:06 )    Color: x / Appearance: x / SG: x / pH: x  Gluc: 134 mg/dL / Ketone: x  / Bili: x / Urobili: x   Blood: x / Protein: x / Nitrite: x   Leuk Esterase: x / RBC: x / WBC x   Sq Epi: x / Non Sq Epi: x / Bacteria: x      CAPILLARY BLOOD GLUCOSE        LIVER FUNCTIONS - ( 13 Jan 2024 05:06 )  Alb: 2.2 g/dL / Pro: 8.2 g/dL / ALK PHOS: 104 U/L / ALT: see note U/L / AST: see note U/L / GGT: x             RADIOLOGY & ADDITIONAL STUDIES:   Overnight events: No acute overnight events.     SUBJECTIVE: Patient seen at bedside with chief resident. Patient eating, feeling well.       MEDICATIONS  (STANDING):  chlorhexidine 2% Cloths 1 Application(s) Topical <User Schedule>  cholestyramine Powder (Sugar-Free) 4 Gram(s) Oral daily  enoxaparin Injectable 90 milliGRAM(s) SubCutaneous every 12 hours  epoetin matt-epbx (RETACRIT) Injectable 56790 Unit(s) SubCutaneous every 7 days  ergocalciferol 57781 Unit(s) Oral <User Schedule>  gabapentin 100 milliGRAM(s) Oral at bedtime  glucagon  Injectable 1 milliGRAM(s) IntraMuscular once  hydrALAZINE Injectable 10 milliGRAM(s) IV Push every 6 hours  levothyroxine Injectable 30 MICROGram(s) IV Push at bedtime  lipid, fat emulsion (Fish Oil and Plant Based) 20% Infusion 0.54 Gm/kG/Day (20.83 mL/Hr) IV Continuous <Continuous>  melatonin 5 milliGRAM(s) Oral at bedtime  metoprolol tartrate Injectable 10 milliGRAM(s) IV Push every 6 hours  Parenteral Nutrition - Adult 1 Each TPN Continuous <Continuous>  timolol 0.5% Solution 1 Drop(s) Both EYES daily  ursodiol Suspension 300 milliGRAM(s) Oral every 8 hours  venlafaxine XR. 150 milliGRAM(s) Oral daily    MEDICATIONS  (PRN):  chlorhexidine 0.12% Liquid 15 milliLiter(s) Swish and Spit two times a day PRN oral hygeine  EPINEPHrine   1 mG/mL (1:1,000) Topical Solution 1 Application(s) Topical every 24 hours PRN for wound bleeding  LORazepam   Injectable 1 milliGRAM(s) IV Push <User Schedule> PRN Anxiety  ondansetron Injectable 4 milliGRAM(s) IV Push every 6 hours PRN Nausea and/or Vomiting      Vital Signs Last 24 Hrs  T(C): 36.8 (12 Jan 2024 22:14), Max: 36.8 (12 Jan 2024 22:14)  T(F): 98.3 (12 Jan 2024 22:14), Max: 98.3 (12 Jan 2024 22:14)  HR: 89 (13 Jan 2024 04:30) (80 - 107)  BP: 110/55 (13 Jan 2024 04:30) (110/55 - 148/69)  BP(mean): 77 (13 Jan 2024 04:30) (76 - 107)  RR: 21 (13 Jan 2024 04:30) (17 - 36)  SpO2: 100% (13 Jan 2024 04:30) (96% - 100%)    Parameters below as of 13 Jan 2024 04:30  Patient On (Oxygen Delivery Method): BiPAP/CPAP        Physical Exam:  General: NAD, resting comfortably in bed  Pulmonary: Nonlabored breathing, no respiratory distress  Cardiovascular: NSR  Abdominal: soft, NT/ND, ostomy patent with green output, right pouch minimal output  Extremities: WWP, normal strength  Neuro: A/O x 3, CNs II-XII grossly intact,    I&O's Summary    11 Jan 2024 07:01  -  12 Jan 2024 07:00  --------------------------------------------------------  IN: 4917.2 mL / OUT: 2775 mL / NET: 2142.2 mL    12 Jan 2024 07:01  -  13 Jan 2024 06:02  --------------------------------------------------------  IN: 4204.4 mL / OUT: 2975 mL / NET: 1229.4 mL        LABS:                        8.9    11.46 )-----------( 186      ( 13 Jan 2024 05:06 )             28.8     01-13    135  |  102  |  40<H>  ----------------------------<  134<H>  see note   |  26  |  1.32<H>    Ca    7.9<L>      13 Jan 2024 05:06  Phos  3.6     01-13  Mg     2.1     01-13    TPro  8.2  /  Alb  2.2<L>  /  TBili  5.5<H>  /  DBili  see note  /  AST  see note  /  ALT  see note  /  AlkPhos  104  01-13      Urinalysis Basic - ( 13 Jan 2024 05:06 )    Color: x / Appearance: x / SG: x / pH: x  Gluc: 134 mg/dL / Ketone: x  / Bili: x / Urobili: x   Blood: x / Protein: x / Nitrite: x   Leuk Esterase: x / RBC: x / WBC x   Sq Epi: x / Non Sq Epi: x / Bacteria: x      CAPILLARY BLOOD GLUCOSE        LIVER FUNCTIONS - ( 13 Jan 2024 05:06 )  Alb: 2.2 g/dL / Pro: 8.2 g/dL / ALK PHOS: 104 U/L / ALT: see note U/L / AST: see note U/L / GGT: x             RADIOLOGY & ADDITIONAL STUDIES:   Overnight events: No acute overnight events.     SUBJECTIVE: Patient seen at bedside with chief resident. Patient eating, feeling well.       MEDICATIONS  (STANDING):  chlorhexidine 2% Cloths 1 Application(s) Topical <User Schedule>  cholestyramine Powder (Sugar-Free) 4 Gram(s) Oral daily  enoxaparin Injectable 90 milliGRAM(s) SubCutaneous every 12 hours  epoetin matt-epbx (RETACRIT) Injectable 41934 Unit(s) SubCutaneous every 7 days  ergocalciferol 57021 Unit(s) Oral <User Schedule>  gabapentin 100 milliGRAM(s) Oral at bedtime  glucagon  Injectable 1 milliGRAM(s) IntraMuscular once  hydrALAZINE Injectable 10 milliGRAM(s) IV Push every 6 hours  levothyroxine Injectable 30 MICROGram(s) IV Push at bedtime  lipid, fat emulsion (Fish Oil and Plant Based) 20% Infusion 0.54 Gm/kG/Day (20.83 mL/Hr) IV Continuous <Continuous>  melatonin 5 milliGRAM(s) Oral at bedtime  metoprolol tartrate Injectable 10 milliGRAM(s) IV Push every 6 hours  Parenteral Nutrition - Adult 1 Each TPN Continuous <Continuous>  timolol 0.5% Solution 1 Drop(s) Both EYES daily  ursodiol Suspension 300 milliGRAM(s) Oral every 8 hours  venlafaxine XR. 150 milliGRAM(s) Oral daily    MEDICATIONS  (PRN):  chlorhexidine 0.12% Liquid 15 milliLiter(s) Swish and Spit two times a day PRN oral hygeine  EPINEPHrine   1 mG/mL (1:1,000) Topical Solution 1 Application(s) Topical every 24 hours PRN for wound bleeding  LORazepam   Injectable 1 milliGRAM(s) IV Push <User Schedule> PRN Anxiety  ondansetron Injectable 4 milliGRAM(s) IV Push every 6 hours PRN Nausea and/or Vomiting      Vital Signs Last 24 Hrs  T(C): 36.8 (12 Jan 2024 22:14), Max: 36.8 (12 Jan 2024 22:14)  T(F): 98.3 (12 Jan 2024 22:14), Max: 98.3 (12 Jan 2024 22:14)  HR: 89 (13 Jan 2024 04:30) (80 - 107)  BP: 110/55 (13 Jan 2024 04:30) (110/55 - 148/69)  BP(mean): 77 (13 Jan 2024 04:30) (76 - 107)  RR: 21 (13 Jan 2024 04:30) (17 - 36)  SpO2: 100% (13 Jan 2024 04:30) (96% - 100%)    Parameters below as of 13 Jan 2024 04:30  Patient On (Oxygen Delivery Method): BiPAP/CPAP        Physical Exam:  General: NAD, resting comfortably in bed  Pulmonary: Nonlabored breathing, no respiratory distress  Cardiovascular: NSR  Abdominal: soft, NT/ND, ostomy patent with green output, right pouch minimal output  Extremities: WWP, normal strength  Neuro: A/O x 3, CNs II-XII grossly intact,    I&O's Summary    11 Jan 2024 07:01  -  12 Jan 2024 07:00  --------------------------------------------------------  IN: 4917.2 mL / OUT: 2775 mL / NET: 2142.2 mL    12 Jan 2024 07:01  -  13 Jan 2024 06:02  --------------------------------------------------------  IN: 4204.4 mL / OUT: 2975 mL / NET: 1229.4 mL        LABS:                        8.9    11.46 )-----------( 186      ( 13 Jan 2024 05:06 )             28.8     01-13    135  |  102  |  40<H>  ----------------------------<  134<H>  see note   |  26  |  1.32<H>    Ca    7.9<L>      13 Jan 2024 05:06  Phos  3.6     01-13  Mg     2.1     01-13    TPro  8.2  /  Alb  2.2<L>  /  TBili  5.5<H>  /  DBili  see note  /  AST  see note  /  ALT  see note  /  AlkPhos  104  01-13      Urinalysis Basic - ( 13 Jan 2024 05:06 )    Color: x / Appearance: x / SG: x / pH: x  Gluc: 134 mg/dL / Ketone: x  / Bili: x / Urobili: x   Blood: x / Protein: x / Nitrite: x   Leuk Esterase: x / RBC: x / WBC x   Sq Epi: x / Non Sq Epi: x / Bacteria: x      CAPILLARY BLOOD GLUCOSE        LIVER FUNCTIONS - ( 13 Jan 2024 05:06 )  Alb: 2.2 g/dL / Pro: 8.2 g/dL / ALK PHOS: 104 U/L / ALT: see note U/L / AST: see note U/L / GGT: x             RADIOLOGY & ADDITIONAL STUDIES:

## 2024-01-13 NOTE — PROGRESS NOTE ADULT - ASSESSMENT
A/p: 78M w PMH Crohn's, AFib/Flutter s/p DCCVs on amiodarone, remote ileocectomy and open appendectomy. Admitted (6/23) for SBO vs Crohns flare, s/p NGT decompression and s/p lap converted to open TRE, SBR x 3, left in discontinuity with abthera vac on (6/27), RTOR for ileocolic resection, small bowel anastomosis, and abdominal wall closure on (6/28), c/b fluid collection s/p IR aspiration of perihepatic fluid on (7/3), c/b wound dehiscence s/p RTOR exlap, washout, ileocolic resection with end ileostomy, blow hole colostomy, red rubber from ileostomy to small bowel anastomosis; vicryl bridging mesh on (7/5) transferred to SICU postoperatively for hemodynamic monitoring, with hospital course complicated by periods of severe ELVI and hyponatremia, which resolved but stepped back up for to SICU on (9/10) for acute AMS, intermittent hypoglycemia, AFib with RVR. Percutaneous Cholecystostomy placed on (9/11) for enlarged GB, PCT capped 10/5 and uncapped 10/25 for hyperbilirubinemia, Right brachial DVT, left basillic and cephalic superficial thrombus on duplex 11/2, CT 11/14 with ALDEN ground glass opacity of unclear etiology, completed empiric 7day cefepime course 11/22, rising T-bili of unclear etilogy, now with resolved candida glabrata fungemia, ELVI improving.     NEURO: Hx of depression: cont Effexor (halved dose in setting of renal dysfunx). Anxiety: Lorazepam PRN. Holistic consult for anxiety and insomnia. Gabapentin for restless leg syndrome.   HEENT: Timolol ete gtt added on 1/10 for additional systemic BB support in setting of malabsorption  CV: Hx of Afib and recent Aflutter alternating with sinus tachycardia (HR 100s),: Cont Lopressor to 10 q6h, added timolol eye ggt for attempted systemic affect. Started therapeutic Lovenox on 1/10. s/p previous DCCV, off Amiodarone and Lipitor due to persistent transaminitis. BLE duplex (1/5) no DVT, TTE  (7/18) - PASP 64mmHg, EF 65-70%. holding Losartan & norvasc because not absorbing PO meds, HTN: hydralazine 10 q6  Standing. TTE (1/4) LVEF 75%, mild/mod AR, PASP 40, RVEF wnl. Given Increased fluid in TPN will watch I/O over the next few days.   PULM: Atelectasis/dyspnea improved clinically: cont 1 hr of BiPAP every 12hrs and nasal canula or room air. Encourage OOB and IS. CT from 11/14 with ALDEN ground glass opacity of unclear etiology. COVID negative. RVP negative. Completed 7d empiric cefepime 11/22 to cover for potential PNA.   GI/FEN: Low res, low fat, high protein diet. Cont Ensure max x1/day however pt likely not absorbing adequately due to proximal fistula. Folate, thiamine. PICC replaced 12/4, switched out PICC on 1/4. Continue TPN/lipids, 1L extra in TPN in order to make I/O even. 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 Discussed with Pharmacy and with patient. cont Octreotide & Cholestyramine 10/18. Transaminitis slowly improved, elevated T bili, s/p Perc Deb on 9/11, failed first capping trial 10/2023. Repeat capping trial 1/2/24, no elevation in Tbili to date; seen by Hepatology - MRCP 10/30 no obstruction, cont ursodiol, RUQ US 11/25 CBD 5mm, hepatic vessels patent. Cont Vit D weekly.   : Voids. LEVI improved: stopped famotidine given renal dysfunction. MARLO Duplex and RP US unremarkable, CK wnl.    ENDO: Hx hypothyroid: IV Synthroid,  Repeat thyroid studies WNL 1/3/24.   ID: currently not on any abx/antifungals. BCx 11/22 grew candida glabrata, likely CLABSI. s/p Caspo & completed Fluconazole course (12/1-12/9). Ophthalmology evaluated - normal ocular exam. Echo no vegetation. PICC replaced on 12/4. Cont Nystatin powder. // DC: caspofungin (11/24-12/1). empiric 7days cefepime (11/15-11/22), C. tertium, Lactobacillus from IR cx 7/3, and candida albicans, lactobacillus, vanc sensitive E faecium, vanc resistant E gallinarum, vanc resistant E casseliflavus, lactobacillus from OR cx 7/5. Completed course of abx with Imipenem (9/10-9/15). Imipenem (8/26--) imipenem (6/30-7/12, 7/23-7/30), Dapto (6/30-7/5 and 7/23-7/24). Empiric Dapto (8/23-24) and Cefepime (8/23-24).   PPX: SCDs, Cont Lovenox therapeutic for Afib  HEME: Anemia - continue Epogen weekly. S/p Iron Sucrose 200 qd x 3 days for chronic anemia.   LINES: PIVs, New RUE PICC placed (1/5--) will plan to switch PICC once a month, secondary to history of fungemia // DC: LUE PICC (9/1-11/1 ), RUE PICC: (11/1-11/24), LUE PICC (12/4-1/5)  WOUNDS/DRAINS: Abdominal wound and fistula with wound manager in place dressing change daily. Had recurrent punctate bleeding at wound bed, s/p placed surgicel, attempt to stitch, electrocautery and epi soaked gauze. Cont epinephrine soaked gauze at bedside. RLQ Ostomy. IR perc deb tube capped 1/2 // DC: L Clay drain.  PT: Ambulate as tolerated OOB to chair daily.  DISPO: SICU due to complicated hospital course. Goal to dc home in 2weeks: Spoke with case management and wound care on 1/10 to alert them of possible discharge home with services the week of 1/22 vs 1/29.   A/p: 78M w PMH Crohn's, AFib/Flutter s/p DCCVs on amiodarone, remote ileocectomy and open appendectomy. Admitted (6/23) for SBO vs Crohns flare, s/p NGT decompression and s/p lap converted to open TRE, SBR x 3, left in discontinuity with abthera vac on (6/27), RTOR for ileocolic resection, small bowel anastomosis, and abdominal wall closure on (6/28), c/b fluid collection s/p IR aspiration of perihepatic fluid on (7/3), c/b wound dehiscence s/p RTOR exlap, washout, ileocolic resection with end ileostomy, blow hole colostomy, red rubber from ileostomy to small bowel anastomosis; vicryl bridging mesh on (7/5) transferred to SICU postoperatively for hemodynamic monitoring, with hospital course complicated by periods of severe ELVI and hyponatremia, which resolved but stepped back up for to SICU on (9/10) for acute AMS, intermittent hypoglycemia, AFib with RVR. Percutaneous Cholecystostomy placed on (9/11) for enlarged GB, PCT capped 10/5 and uncapped 10/25 for hyperbilirubinemia, Right brachial DVT, left basillic and cephalic superficial thrombus on duplex 11/2, CT 11/14 with ALDEN ground glass opacity of unclear etiology, completed empiric 7day cefepime course 11/22, rising T-bili of unclear etilogy, now with resolved candida glabrata fungemia, ELVI improving.     NEURO: Hx of depression: cont Effexor (halved dose in setting of renal dysfunx). Anxiety: Lorazepam PRN. Holistic consult for anxiety and insomnia. Gabapentin for restless leg syndrome.   HEENT: Timolol ete gtt added on 1/10 for additional systemic BB support in setting of malabsorption  CV: Hx of Afib and recent Aflutter alternating with sinus tachycardia (HR 100s),: Cont Lopressor to 10 q6h, added timolol eye ggt for attempted systemic affect. Started therapeutic Lovenox on 1/10. s/p previous DCCV, off Amiodarone and Lipitor due to persistent transaminitis. BLE duplex (1/5) no DVT, TTE  (7/18) - PASP 64mmHg, EF 65-70%. holding Losartan & norvasc because not absorbing PO meds, HTN: hydralazine 10 q6  Standing. TTE (1/4) LVEF 75%, mild/mod AR, PASP 40, RVEF wnl. Given Increased fluid in TPN will watch I/O over the next few days.   PULM: Atelectasis/dyspnea improved clinically: cont 1 hr of BiPAP every 12hrs and nasal canula or room air. Encourage OOB and IS. CT from 11/14 with ALDEN ground glass opacity of unclear etiology. COVID negative. RVP negative. Completed 7d empiric cefepime 11/22 to cover for potential PNA.   GI/FEN: Low res, low fat, high protein diet. Cont Ensure max x1/day however pt likely not absorbing adequately due to proximal fistula. Folate, thiamine. PICC replaced 12/4, switched out PICC on 1/4. Continue TPN/lipids, 1L extra in TPN in order to make I/O even. 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 Discussed with Pharmacy and with patient. cont Octreotide & Cholestyramine 10/18. Transaminitis slowly improved, elevated T bili, s/p Perc Deb on 9/11, failed first capping trial 10/2023. Repeat capping trial 1/2/24, no elevation in Tbili to date; seen by Hepatology - MRCP 10/30 no obstruction, cont ursodiol, RUQ US 11/25 CBD 5mm, hepatic vessels patent. Cont Vit D weekly.   : Voids. ELVI improved: stopped famotidine given renal dysfunction. MARLO Duplex and RP US unremarkable, CK wnl.    ENDO: Hx hypothyroid: IV Synthroid,  Repeat thyroid studies WNL 1/3/24.   ID: currently not on any abx/antifungals. BCx 11/22 grew candida glabrata, likely CLABSI. s/p Caspo & completed Fluconazole course (12/1-12/9). Ophthalmology evaluated - normal ocular exam. Echo no vegetation. PICC replaced on 12/4. Cont Nystatin powder. // DC: caspofungin (11/24-12/1). empiric 7days cefepime (11/15-11/22), C. tertium, Lactobacillus from IR cx 7/3, and candida albicans, lactobacillus, vanc sensitive E faecium, vanc resistant E gallinarum, vanc resistant E casseliflavus, lactobacillus from OR cx 7/5. Completed course of abx with Imipenem (9/10-9/15). Imipenem (8/26--) imipenem (6/30-7/12, 7/23-7/30), Dapto (6/30-7/5 and 7/23-7/24). Empiric Dapto (8/23-24) and Cefepime (8/23-24).   PPX: SCDs, Cont Lovenox therapeutic for Afib  HEME: Anemia - continue Epogen weekly. S/p Iron Sucrose 200 qd x 3 days for chronic anemia.   LINES: PIVs, New RUE PICC placed (1/5--) will plan to switch PICC once a month, secondary to history of fungemia // DC: LUE PICC (9/1-11/1 ), RUE PICC: (11/1-11/24), LUE PICC (12/4-1/5)  WOUNDS/DRAINS: Abdominal wound and fistula with wound manager in place dressing change daily. Had recurrent punctate bleeding at wound bed, s/p placed surgicel, attempt to stitch, electrocautery and epi soaked gauze. Cont epinephrine soaked gauze at bedside. RLQ Ostomy. IR perc deb tube capped 1/2 // DC: L Clay drain.  PT: Ambulate as tolerated OOB to chair daily.  DISPO: SICU due to complicated hospital course. Goal to dc home in 2weeks: Spoke with case management and wound care on 1/10 to alert them of possible discharge home with services the week of 1/22 vs 1/29.

## 2024-01-14 NOTE — PROGRESS NOTE ADULT - ATTENDING COMMENTS
UC, AF, SBR, ileostomy, ileocolic resection, enteroatmospheric fistula  physical as above  continue TPN  bilirubin decreasing  good fluid balance  continue metoprolol  continue lovencox

## 2024-01-14 NOTE — PROGRESS NOTE ADULT - SUBJECTIVE AND OBJECTIVE BOX
INTERVAL HPI/OVERNIGHT EVENTS: low grade tachy <110, extra melatonin x 1, tiny leak from side of appliance from increased pressure - patched w/ stoma paste and no further leak; [place] with soft/formed output, not just liquid    SUBJECTIVE: Patient seen and examined at bedside with chief resident. Patient with no acute complaints, found resting comfortably.       enoxaparin Injectable 90 milliGRAM(s) SubCutaneous every 12 hours  hydrALAZINE Injectable 10 milliGRAM(s) IV Push every 6 hours  metoprolol tartrate Injectable 10 milliGRAM(s) IV Push every 6 hours      Vital Signs Last 24 Hrs  T(C): 36.8 (14 Jan 2024 06:33), Max: 36.9 (13 Jan 2024 22:12)  T(F): 98.2 (14 Jan 2024 06:33), Max: 98.4 (13 Jan 2024 22:12)  HR: 97 (14 Jan 2024 06:00) (87 - 108)  BP: 132/62 (14 Jan 2024 06:00) (105/54 - 160/91)  BP(mean): 89 (14 Jan 2024 06:00) (70 - 114)  RR: 20 (14 Jan 2024 06:00) (20 - 34)  SpO2: 100% (14 Jan 2024 06:00) (93% - 100%)    Parameters below as of 14 Jan 2024 06:00  Patient On (Oxygen Delivery Method): room air      I&O's Detail    13 Jan 2024 07:01  -  14 Jan 2024 07:00  --------------------------------------------------------  IN:    Fat Emulsion (Fish Oil &amp; Plant Based) 20% Infusion: 291.2 mL    Oral Fluid: 1120 mL    TPN (Total Parenteral Nutrition): 3280 mL  Total IN: 4691.2 mL    OUT:    Drain (mL): 2275 mL    Voided (mL): 1775 mL  Total OUT: 4050 mL    Total NET: 641.2 mL      14 Jan 2024 07:01  -  14 Jan 2024 08:16  --------------------------------------------------------  IN:    TPN (Total Parenteral Nutrition): 214 mL  Total IN: 214 mL    OUT:    Voided (mL): 150 mL  Total OUT: 150 mL    Total NET: 64 mL          Physical Exam:  General: NAD, resting comfortably in bed  Pulmonary: Nonlabored breathing, no respiratory distress  Cardiovascular: NSR  Abdominal: soft, NT/ND, ostomy patent with green output, right pouch minimal output  Extremities: WWP, normal strength  Neuro: A/O x 3, CNs II-XII grossly intact,        LABS:                        8.9    11.46 )-----------( 186      ( 13 Jan 2024 05:06 )             28.8     01-13    137  |  104  |  43<H>  ----------------------------<  149<H>  4.2   |  27  |  1.31<H>    Ca    8.2<L>      13 Jan 2024 05:57  Phos  3.3     01-13  Mg     2.2     01-13    TPro  8.1  /  Alb  2.5<L>  /  TBili  5.3<H>  /  DBili  3.6<H>  /  AST  106<H>  /  ALT  70<H>  /  AlkPhos  103  01-13      Urinalysis Basic - ( 13 Jan 2024 05:57 )    Color: x / Appearance: x / SG: x / pH: x  Gluc: 149 mg/dL / Ketone: x  / Bili: x / Urobili: x   Blood: x / Protein: x / Nitrite: x   Leuk Esterase: x / RBC: x / WBC x   Sq Epi: x / Non Sq Epi: x / Bacteria: x        RADIOLOGY & ADDITIONAL STUDIES:

## 2024-01-14 NOTE — PROGRESS NOTE ADULT - ASSESSMENT
A/p: 78M w PMH Crohn's, AFib/Flutter s/p DCCVs on amiodarone, remote ileocectomy and open appendectomy. Admitted (6/23) for SBO vs Crohns flare, s/p NGT decompression and s/p lap converted to open TRE, SBR x 3, left in discontinuity with abthera vac on (6/27), RTOR for ileocolic resection, small bowel anastomosis, and abdominal wall closure on (6/28), c/b fluid collection s/p IR aspiration of perihepatic fluid on (7/3), c/b wound dehiscence s/p RTOR exlap, washout, ileocolic resection with end ileostomy, blow hole colostomy, red rubber from ileostomy to small bowel anastomosis; vicryl bridging mesh on (7/5) transferred to SICU postoperatively for hemodynamic monitoring, with hospital course complicated by periods of severe ELVI and hyponatremia, which resolved but stepped back up for to SICU on (9/10) for acute AMS, intermittent hypoglycemia, AFib with RVR. Percutaneous Cholecystostomy placed on (9/11) for enlarged GB, PCT capped 10/5 and uncapped 10/25 for hyperbilirubinemia, Right brachial DVT, left basillic and cephalic superficial thrombus on duplex 11/2, CT 11/14 with ALDEN ground glass opacity of unclear etiology, completed empiric 7day cefepime course 11/22, rising T-bili of unclear etilogy, now with resolved candida glabrata fungemia, ELVI improving.     NEURO: Hx of depression: cont Effexor (halved dose in setting of renal dysfunx). Anxiety: Lorazepam PRN. Holistic consult for anxiety and insomnia. Gabapentin for restless leg syndrome.   HEENT: Timolol eye gtt added on 1/10 for additional systemic BB support in setting of malabsorption  CV: Hx of Afib and recent Aflutter alternating with sinus tachycardia (HR 100s),: Cont Lopressor to 10 q6h, added timolol eye ggt for attempted systemic affect. Started therapeutic Lovenox on 1/10. s/p previous DCCV, off Amiodarone and Lipitor due to persistent transaminitis. BLE duplex (1/5) no DVT, TTE  (7/18) - PASP 64mmHg, EF 65-70%. holding Losartan & norvasc because not absorbing PO meds, HTN: hydralazine 10 q6  Standing. TTE (1/4) LVEF 75%, mild/mod AR, PASP 40, RVEF wnl. Given Increased fluid in TPN will watch I/O over the next few days.   PULM: Atelectasis/dyspnea improved clinically: cont 1 hr of BiPAP every 12hrs and nasal canula or room air. Encourage OOB and IS. CT from 11/14 with ALDEN ground glass opacity of unclear etiology. COVID negative. RVP negative. Completed 7d empiric cefepime 11/22 to cover for potential PNA.   GI/FEN: Low res, low fat, high protein diet. Cont Ensure max x1/day however pt likely not absorbing adequately due to proximal fistula. Folate, thiamine. PICC replaced 12/4, switched out PICC on 1/4. Continue TPN/lipids, 1L extra in TPN in order to make I/O even. 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 Discussed with Pharmacy and with patient. cont Octreotide & Cholestyramine 10/18. Transaminitis slowly improved, elevated T bili, s/p Perc Deb on 9/11, failed first capping trial 10/2023. Repeat capping trial 1/2/24, no elevation in Tbili to date; seen by Hepatology - MRCP 10/30 no obstruction, cont ursodiol, RUQ US 11/25 CBD 5mm, hepatic vessels patent. Cont Vit D weekly.   : Voids. ELVI improved: stopped famotidine given renal dysfunction. MARLO Duplex and RP US unremarkable, CK wnl.    ENDO: Hx hypothyroid: IV Synthroid,  Repeat thyroid studies WNL 1/3/24.   ID: currently not on any abx/antifungals. BCx 11/22 grew candida glabrata, likely CLABSI. s/p Caspo & completed Fluconazole course (12/1-12/9). Ophthalmology evaluated - normal ocular exam. Echo no vegetation. PICC replaced on 12/4. Cont Nystatin powder. // DC: caspofungin (11/24-12/1). empiric 7days cefepime (11/15-11/22), C. tertium, Lactobacillus from IR cx 7/3, and candida albicans, lactobacillus, vanc sensitive E faecium, vanc resistant E gallinarum, vanc resistant E casseliflavus, lactobacillus from OR cx 7/5. Completed course of abx with Imipenem (9/10-9/15). Imipenem (8/26--) imipenem (6/30-7/12, 7/23-7/30), Dapto (6/30-7/5 and 7/23-7/24). Empiric Dapto (8/23-24) and Cefepime (8/23-24).   PPX: SCDs, Cont Lovenox therapeutic for Afib  HEME: Anemia - continue Epogen weekly. S/p Iron Sucrose 200 qd x 3 days for chronic anemia.   LINES: PIVs, New RUE PICC placed (1/5--) will plan to switch PICC once a month, secondary to history of fungemia // DC: LUE PICC (9/1-11/1 ), RUE PICC: (11/1-11/24), LUE PICC (12/4-1/5)  WOUNDS/DRAINS: Abdominal wound and fistula with wound manager in place dressing change daily. Had recurrent punctate bleeding at wound bed, s/p placed surgicel, attempt to stitch, electrocautery and epi soaked gauze. Cont epinephrine soaked gauze at bedside. RLQ Ostomy. IR perc deb tube capped 1/2 // DC: L Clay drain.  PT: Ambulate as tolerated OOB to chair daily.  DISPO: SICU due to complicated hospital course. Goal to dc home in 2weeks: Spoke with case management and wound care on 1/10 to alert them of possible discharge home with services the week of 1/22 vs 1/29. A/p: 78M w PMH Crohn's, AFib/Flutter s/p DCCVs on amiodarone, remote ileocectomy and open appendectomy. Admitted (6/23) for SBO vs Crohns flare, s/p NGT decompression and s/p lap converted to open TRE, SBR x 3, left in discontinuity with abthera vac on (6/27), RTOR for ileocolic resection, small bowel anastomosis, and abdominal wall closure on (6/28), c/b fluid collection s/p IR aspiration of perihepatic fluid on (7/3), c/b wound dehiscence s/p RTOR exlap, washout, ileocolic resection with end ileostomy, blow hole colostomy, red rubber from ileostomy to small bowel anastomosis; vicryl bridging mesh on (7/5) transferred to SICU postoperatively for hemodynamic monitoring, with hospital course complicated by periods of severe ELVI and hyponatremia, which resolved but stepped back up for to SICU on (9/10) for acute AMS, intermittent hypoglycemia, AFib with RVR. Percutaneous Cholecystostomy placed on (9/11) for enlarged GB, PCT capped 10/5 and uncapped 10/25 for hyperbilirubinemia, Right brachial DVT, left basillic and cephalic superficial thrombus on duplex 11/2, CT 11/14 with ALDEN ground glass opacity of unclear etiology, completed empiric 7day cefepime course 11/22, rising T-bili of unclear etilogy, now with resolved candida glabrata fungemia, ELVI improving.     NEURO: Hx of depression: cont Effexor (halved dose in setting of renal dysfunx). Anxiety: Lorazepam PRN. Holistic consult for anxiety and insomnia. Gabapentin for restless leg syndrome.   HEENT: Timolol eye gtt added on 1/10 for additional systemic BB support in setting of malabsorption.  CV: Hx of Afib and recent Aflutter alternating with sinus tachycardia (HR 100s),: Cont Lopressor to 10 q6h, added timolol eye ggt for attempted systemic affect. Started therapeutic Lovenox on 1/10. s/p previous DCCV, off Amiodarone and Lipitor due to persistent transaminitis. BLE duplex (1/5) no DVT, TTE  (7/18) - PASP 64mmHg, EF 65-70%. holding Losartan & norvasc because not absorbing PO meds, HTN: hydralazine 10 q6  Standing. TTE (1/4) LVEF 75%, mild/mod AR, PASP 40, RVEF wnl. Given Increased fluid in TPN will watch I/O over the next few days.   PULM: Atelectasis/dyspnea improved clinically: cont 1 hr of BiPAP every 12hrs and nasal canula or room air. Encourage OOB and IS. Completed 7d empiric cefepime 11/22 to cover for potential PNA.   GI/FEN: Low res, low fat, high protein diet. Cont Ensure max x1/day however pt likely not absorbing adequately due to proximal fistula. Folate, thiamine. PICC replaced 12/4, switched out PICC on 1/4. Continue TPN/lipids, 1L extra in TPN reordered in order to make I/O even. 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 Discussed with Pharmacy and with patient. cont Octreotide & Cholestyramine 10/18. Transaminitis slowly improved, elevated T bili, s/p Perc Deb on 9/11, failed first capping trial 10/2023. Repeat capping trial 1/2/24, no elevation in Tbili to date; seen by Hepatology - MRCP 10/30 no obstruction, cont ursodiol, RUQ US 11/25 CBD 5mm, hepatic vessels patent. Cont Vit D weekly.   : Voids. ELVI improved: stopped famotidine given renal dysfunction. MARLO Duplex and RP US unremarkable, CK wnl.    ENDO: Hx hypothyroid: IV Synthroid,  Repeat thyroid studies WNL 1/3/24.   ID: currently not on any abx/antifungals. BCx 11/22 grew candida glabrata, likely CLABSI. s/p Caspo & completed Fluconazole course (12/1-12/9). Ophthalmology evaluated - normal ocular exam. Echo no vegetation. PICC replaced on 12/4. Cont Nystatin powder. // DC: caspofungin (11/24-12/1). empiric 7days cefepime (11/15-11/22), C. tertium, Lactobacillus from IR cx 7/3, and candida albicans, lactobacillus, vanc sensitive E faecium, vanc resistant E gallinarum, vanc resistant E casseliflavus, lactobacillus from OR cx 7/5. Completed course of abx with Imipenem (9/10-9/15). Imipenem (8/26--) imipenem (6/30-7/12, 7/23-7/30), Dapto (6/30-7/5 and 7/23-7/24). Empiric Dapto (8/23-24) and Cefepime (8/23-24).   PPX: SCDs, Cont Lovenox therapeutic for Afib  HEME: Anemia - continue Epogen weekly. S/p Iron Sucrose 200 qd x 3 days for chronic anemia.   LINES: PIVs, New RUE PICC placed (1/5--) will plan to switch PICC once a month, secondary to history of fungemia // DC: LUE PICC (9/1-11/1 ), RUE PICC: (11/1-11/24), LUE PICC (12/4-1/5)  WOUNDS/DRAINS: Abdominal wound and fistula with wound manager in place dressing change daily. Had recurrent punctate bleeding at wound bed, s/p placed surgicel, attempt to stitch, electrocautery and epi soaked gauze. Cont epinephrine soaked gauze at bedside. RLQ Ostomy. IR perc deb tube capped 1/2 // DC: L Clay drain.  PT: Ambulate as tolerated OOB to chair daily.  DISPO: SICU due to complicated hospital course. Goal to dc home in 2weeks: Spoke with case management and wound care on 1/10 to alert them of possible discharge home with services the week of 1/22 vs 1/29.

## 2024-01-14 NOTE — PROGRESS NOTE ADULT - ASSESSMENT
78 year old male with a PMHx of Crohn's, AFib/Flutter s/p DCCVs on amiodarone, remote ileocectomy and open appendectomy. Admitted (6/23) for SBO vs Crohns flare, s/p NGT decompression and s/p lap converted to open TRE, SBR x 3, left in discontinuity with abthera vac on (6/27), RTOR for ileocolic resection, small bowel anastomosis, and abdominal wall closure on (6/28), c/b fluid collection s/p IR aspiration of perihepatic fluid on (7/3), c/b wound dehiscence s/p RTOR exlap, washout, ileocolic resection with end ileostomy, blow hole colostomy, red rubber from ileostomy to small bowel anastomosis; vicryl bridging mesh on (7/5) transferred to SICU postoperatively for hemodynamic monitoring, with hospital course complicated by periods of severe ELVI and hyponatremia, which resolved but stepped back up for to SICU on (9/10) for acute AMS, intermittent hypoglycemia, AFib with RVR. Percutaneous Cholecystostomy placed on (9/11) for enlarged GB, PCT capped 10/5 and uncapped 10/25 for hyperbilirubinemia, Right brachial DVT, left basillic and cephalic superficial thrombus on duplex 11/2, CT 11/14 with ALDEN ground glass opacity of unclear etiology, completed empiric 7day cefepime course 11/22, rising T-bili of unclear etiology now w resolved candida glabrata fungemia, ELVI improving.     - Continue regular diet and TPN   - Monitor I&Os   - Gattex   - Continue wound care   - Out of bed and ambulation   - Rest of care per SICU

## 2024-01-14 NOTE — PROGRESS NOTE ADULT - SUBJECTIVE AND OBJECTIVE BOX
INTERVAL HPI/OVERNIGHT EVENTS:  ON: low grade tachy <110, extra melatonin x 1, tiny leak from side of appliance from increased pressure - patched w/ stoma paste and no further leak; Eakins with soft/formed output, not just liquid    STATUS POST:    6/27: Laparoscopic lysis of adhesions, converted to open lysis of adhesions, SBR x 3, temporary abdominal closure, 5L cryst, 1L 5% alb, 3 pRBC, 1 FFP, 1 plts  6/28: ex lap, removal of abthera, ileocolic resection, small bowel anastamosis, , 1.6 crystalloid, 250 5%, 175 uop   7/3: IR Gendel drained perihepatic aspiration of serous fluid.   7/5: RTOR exlap, washout, ileocolic resection with end ileostomy, blow hole colostomy, fistula, red rubber from ileostomy to small bowel anastomosis; vicryl bridging mesh; R JOYCE below ileostomy, L JOYCE at small bowel enterotomy repair; 500 LR, 500 5% albumin, 3u PRBC, 2 FFP, 400 UOP,   9/11: s/p perc cony    SUBJECTIVE:  Patient seen and examined by chief resident and team on AM rounds.     MEDICATIONS  (STANDING):  chlorhexidine 2% Cloths 1 Application(s) Topical <User Schedule>  cholestyramine Powder (Sugar-Free) 4 Gram(s) Oral daily  enoxaparin Injectable 90 milliGRAM(s) SubCutaneous every 12 hours  epoetin matt-epbx (RETACRIT) Injectable 67378 Unit(s) SubCutaneous every 7 days  ergocalciferol 06613 Unit(s) Oral <User Schedule>  gabapentin 100 milliGRAM(s) Oral at bedtime  glucagon  Injectable 1 milliGRAM(s) IntraMuscular once  hydrALAZINE Injectable 10 milliGRAM(s) IV Push every 6 hours  levothyroxine Injectable 30 MICROGram(s) IV Push at bedtime  lipid, fat emulsion (Fish Oil and Plant Based) 20% Infusion 0.54 Gm/kG/Day (20.83 mL/Hr) IV Continuous <Continuous>  lipid, fat emulsion (Fish Oil and Plant Based) 20% Infusion 0.54 Gm/kG/Day (20.83 mL/Hr) IV Continuous <Continuous>  melatonin 5 milliGRAM(s) Oral at bedtime  metoprolol tartrate Injectable 10 milliGRAM(s) IV Push every 6 hours  Parenteral Nutrition - Adult 1 Each TPN Continuous <Continuous>  Parenteral Nutrition - Adult 1 Each TPN Continuous <Continuous>  timolol 0.5% Solution 1 Drop(s) Both EYES daily  ursodiol Suspension 300 milliGRAM(s) Oral every 8 hours  venlafaxine XR. 150 milliGRAM(s) Oral daily    MEDICATIONS  (PRN):  chlorhexidine 0.12% Liquid 15 milliLiter(s) Swish and Spit two times a day PRN oral hygeine  EPINEPHrine   1 mG/mL (1:1,000) Topical Solution 1 Application(s) Topical every 24 hours PRN for wound bleeding  LORazepam   Injectable 1 milliGRAM(s) IV Push <User Schedule> PRN Anxiety  ondansetron Injectable 4 milliGRAM(s) IV Push every 6 hours PRN Nausea and/or Vomiting      Vital Signs Last 24 Hrs  T(C): 36.8 (14 Jan 2024 06:33), Max: 36.9 (13 Jan 2024 22:12)  T(F): 98.2 (14 Jan 2024 06:33), Max: 98.4 (13 Jan 2024 22:12)  HR: 97 (14 Jan 2024 06:00) (87 - 108)  BP: 132/62 (14 Jan 2024 06:00) (105/54 - 160/91)  BP(mean): 89 (14 Jan 2024 06:00) (70 - 114)  RR: 20 (14 Jan 2024 06:00) (20 - 34)  SpO2: 100% (14 Jan 2024 06:00) (94% - 100%)    Parameters below as of 14 Jan 2024 06:00  Patient On (Oxygen Delivery Method): room air        PHYSICAL EXAM:      Constitutional: A&Ox3    Breasts:    Respiratory: non labored breathing, no respiratory distress    Cardiovascular: NSR, RRR    Gastrointestinal:                 Incision:    Genitourinary:    Extremities: (-) edema                  I&O's Detail    13 Jan 2024 07:01  -  14 Jan 2024 07:00  --------------------------------------------------------  IN:    Fat Emulsion (Fish Oil &amp; Plant Based) 20% Infusion: 291.2 mL    Oral Fluid: 1120 mL    TPN (Total Parenteral Nutrition): 3280 mL  Total IN: 4691.2 mL    OUT:    Drain (mL): 2275 mL    Voided (mL): 1775 mL  Total OUT: 4050 mL    Total NET: 641.2 mL      14 Jan 2024 07:01  -  14 Jan 2024 08:33  --------------------------------------------------------  IN:    TPN (Total Parenteral Nutrition): 214 mL  Total IN: 214 mL    OUT:    Voided (mL): 150 mL  Total OUT: 150 mL    Total NET: 64 mL          LABS:                        8.9    11.46 )-----------( 186      ( 13 Jan 2024 05:06 )             28.8     01-13    137  |  104  |  43<H>  ----------------------------<  149<H>  4.2   |  27  |  1.31<H>    Ca    8.2<L>      13 Jan 2024 05:57  Phos  3.3     01-13  Mg     2.2     01-13    TPro  8.1  /  Alb  2.5<L>  /  TBili  5.3<H>  /  DBili  3.6<H>  /  AST  106<H>  /  ALT  70<H>  /  AlkPhos  103  01-13      Urinalysis Basic - ( 13 Jan 2024 05:57 )    Color: x / Appearance: x / SG: x / pH: x  Gluc: 149 mg/dL / Ketone: x  / Bili: x / Urobili: x   Blood: x / Protein: x / Nitrite: x   Leuk Esterase: x / RBC: x / WBC x   Sq Epi: x / Non Sq Epi: x / Bacteria: x        RADIOLOGY & ADDITIONAL STUDIES: INTERVAL HPI/OVERNIGHT EVENTS:  ON: low grade tachy <110, extra melatonin x 1, tiny leak from side of appliance from increased pressure - patched w/ stoma paste and no further leak; Eakins with soft/formed output, not just liquid    STATUS POST:    6/27: Laparoscopic lysis of adhesions, converted to open lysis of adhesions, SBR x 3, temporary abdominal closure, 5L cryst, 1L 5% alb, 3 pRBC, 1 FFP, 1 plts  6/28: ex lap, removal of abthera, ileocolic resection, small bowel anastamosis, , 1.6 crystalloid, 250 5%, 175 uop   7/3: IR Gendel drained perihepatic aspiration of serous fluid.   7/5: RTOR exlap, washout, ileocolic resection with end ileostomy, blow hole colostomy, fistula, red rubber from ileostomy to small bowel anastomosis; vicryl bridging mesh; R JOYCE below ileostomy, L JOYCE at small bowel enterotomy repair; 500 LR, 500 5% albumin, 3u PRBC, 2 FFP, 400 UOP,   9/11: s/p perc cony    SUBJECTIVE:  Patient seen and examined by chief resident and team on AM rounds.     MEDICATIONS  (STANDING):  chlorhexidine 2% Cloths 1 Application(s) Topical <User Schedule>  cholestyramine Powder (Sugar-Free) 4 Gram(s) Oral daily  enoxaparin Injectable 90 milliGRAM(s) SubCutaneous every 12 hours  epoetin matt-epbx (RETACRIT) Injectable 73201 Unit(s) SubCutaneous every 7 days  ergocalciferol 25477 Unit(s) Oral <User Schedule>  gabapentin 100 milliGRAM(s) Oral at bedtime  glucagon  Injectable 1 milliGRAM(s) IntraMuscular once  hydrALAZINE Injectable 10 milliGRAM(s) IV Push every 6 hours  levothyroxine Injectable 30 MICROGram(s) IV Push at bedtime  lipid, fat emulsion (Fish Oil and Plant Based) 20% Infusion 0.54 Gm/kG/Day (20.83 mL/Hr) IV Continuous <Continuous>  lipid, fat emulsion (Fish Oil and Plant Based) 20% Infusion 0.54 Gm/kG/Day (20.83 mL/Hr) IV Continuous <Continuous>  melatonin 5 milliGRAM(s) Oral at bedtime  metoprolol tartrate Injectable 10 milliGRAM(s) IV Push every 6 hours  Parenteral Nutrition - Adult 1 Each TPN Continuous <Continuous>  Parenteral Nutrition - Adult 1 Each TPN Continuous <Continuous>  timolol 0.5% Solution 1 Drop(s) Both EYES daily  ursodiol Suspension 300 milliGRAM(s) Oral every 8 hours  venlafaxine XR. 150 milliGRAM(s) Oral daily    MEDICATIONS  (PRN):  chlorhexidine 0.12% Liquid 15 milliLiter(s) Swish and Spit two times a day PRN oral hygeine  EPINEPHrine   1 mG/mL (1:1,000) Topical Solution 1 Application(s) Topical every 24 hours PRN for wound bleeding  LORazepam   Injectable 1 milliGRAM(s) IV Push <User Schedule> PRN Anxiety  ondansetron Injectable 4 milliGRAM(s) IV Push every 6 hours PRN Nausea and/or Vomiting      Vital Signs Last 24 Hrs  T(C): 36.8 (14 Jan 2024 06:33), Max: 36.9 (13 Jan 2024 22:12)  T(F): 98.2 (14 Jan 2024 06:33), Max: 98.4 (13 Jan 2024 22:12)  HR: 97 (14 Jan 2024 06:00) (87 - 108)  BP: 132/62 (14 Jan 2024 06:00) (105/54 - 160/91)  BP(mean): 89 (14 Jan 2024 06:00) (70 - 114)  RR: 20 (14 Jan 2024 06:00) (20 - 34)  SpO2: 100% (14 Jan 2024 06:00) (94% - 100%)    Parameters below as of 14 Jan 2024 06:00  Patient On (Oxygen Delivery Method): room air        PHYSICAL EXAM:      Constitutional: A&Ox3    Breasts:    Respiratory: non labored breathing, no respiratory distress    Cardiovascular: NSR, RRR    Gastrointestinal:                 Incision:    Genitourinary:    Extremities: (-) edema                  I&O's Detail    13 Jan 2024 07:01  -  14 Jan 2024 07:00  --------------------------------------------------------  IN:    Fat Emulsion (Fish Oil &amp; Plant Based) 20% Infusion: 291.2 mL    Oral Fluid: 1120 mL    TPN (Total Parenteral Nutrition): 3280 mL  Total IN: 4691.2 mL    OUT:    Drain (mL): 2275 mL    Voided (mL): 1775 mL  Total OUT: 4050 mL    Total NET: 641.2 mL      14 Jan 2024 07:01  -  14 Jan 2024 08:33  --------------------------------------------------------  IN:    TPN (Total Parenteral Nutrition): 214 mL  Total IN: 214 mL    OUT:    Voided (mL): 150 mL  Total OUT: 150 mL    Total NET: 64 mL          LABS:                        8.9    11.46 )-----------( 186      ( 13 Jan 2024 05:06 )             28.8     01-13    137  |  104  |  43<H>  ----------------------------<  149<H>  4.2   |  27  |  1.31<H>    Ca    8.2<L>      13 Jan 2024 05:57  Phos  3.3     01-13  Mg     2.2     01-13    TPro  8.1  /  Alb  2.5<L>  /  TBili  5.3<H>  /  DBili  3.6<H>  /  AST  106<H>  /  ALT  70<H>  /  AlkPhos  103  01-13      Urinalysis Basic - ( 13 Jan 2024 05:57 )    Color: x / Appearance: x / SG: x / pH: x  Gluc: 149 mg/dL / Ketone: x  / Bili: x / Urobili: x   Blood: x / Protein: x / Nitrite: x   Leuk Esterase: x / RBC: x / WBC x   Sq Epi: x / Non Sq Epi: x / Bacteria: x        RADIOLOGY & ADDITIONAL STUDIES: INTERVAL HPI/OVERNIGHT EVENTS:  ON: low grade tachy <110, extra melatonin x 1, tiny leak from side of appliance from increased pressure - patched w/ stoma paste and no further leak; Eakins with soft/formed output, not just liquid    STATUS POST:    6/27: Laparoscopic lysis of adhesions, converted to open lysis of adhesions, SBR x 3, temporary abdominal closure, 5L cryst, 1L 5% alb, 3 pRBC, 1 FFP, 1 plts  6/28: ex lap, removal of abthera, ileocolic resection, small bowel anastamosis, , 1.6 crystalloid, 250 5%, 175 uop   7/3: IR Gendel drained perihepatic aspiration of serous fluid.   7/5: RTOR exlap, washout, ileocolic resection with end ileostomy, blow hole colostomy, fistula, red rubber from ileostomy to small bowel anastomosis; vicryl bridging mesh; R JOYCE below ileostomy, L JOYCE at small bowel enterotomy repair; 500 LR, 500 5% albumin, 3u PRBC, 2 FFP, 400 UOP,   9/11: s/p perc cony    SUBJECTIVE:  Patient seen and examined by chief resident and team on AM rounds. Pt states he did not sleep well last night. Feels okay this morning though. No pain. No CP/SOB.     MEDICATIONS  (STANDING):  chlorhexidine 2% Cloths 1 Application(s) Topical <User Schedule>  cholestyramine Powder (Sugar-Free) 4 Gram(s) Oral daily  enoxaparin Injectable 90 milliGRAM(s) SubCutaneous every 12 hours  epoetin matt-epbx (RETACRIT) Injectable 73173 Unit(s) SubCutaneous every 7 days  ergocalciferol 22335 Unit(s) Oral <User Schedule>  gabapentin 100 milliGRAM(s) Oral at bedtime  glucagon  Injectable 1 milliGRAM(s) IntraMuscular once  hydrALAZINE Injectable 10 milliGRAM(s) IV Push every 6 hours  levothyroxine Injectable 30 MICROGram(s) IV Push at bedtime  lipid, fat emulsion (Fish Oil and Plant Based) 20% Infusion 0.54 Gm/kG/Day (20.83 mL/Hr) IV Continuous <Continuous>  lipid, fat emulsion (Fish Oil and Plant Based) 20% Infusion 0.54 Gm/kG/Day (20.83 mL/Hr) IV Continuous <Continuous>  melatonin 5 milliGRAM(s) Oral at bedtime  metoprolol tartrate Injectable 10 milliGRAM(s) IV Push every 6 hours  Parenteral Nutrition - Adult 1 Each TPN Continuous <Continuous>  Parenteral Nutrition - Adult 1 Each TPN Continuous <Continuous>  timolol 0.5% Solution 1 Drop(s) Both EYES daily  ursodiol Suspension 300 milliGRAM(s) Oral every 8 hours  venlafaxine XR. 150 milliGRAM(s) Oral daily    MEDICATIONS  (PRN):  chlorhexidine 0.12% Liquid 15 milliLiter(s) Swish and Spit two times a day PRN oral hygeine  EPINEPHrine   1 mG/mL (1:1,000) Topical Solution 1 Application(s) Topical every 24 hours PRN for wound bleeding  LORazepam   Injectable 1 milliGRAM(s) IV Push <User Schedule> PRN Anxiety  ondansetron Injectable 4 milliGRAM(s) IV Push every 6 hours PRN Nausea and/or Vomiting      Vital Signs Last 24 Hrs  T(C): 36.8 (14 Jan 2024 06:33), Max: 36.9 (13 Jan 2024 22:12)  T(F): 98.2 (14 Jan 2024 06:33), Max: 98.4 (13 Jan 2024 22:12)  HR: 97 (14 Jan 2024 06:00) (87 - 108)  BP: 132/62 (14 Jan 2024 06:00) (105/54 - 160/91)  BP(mean): 89 (14 Jan 2024 06:00) (70 - 114)  RR: 20 (14 Jan 2024 06:00) (20 - 34)  SpO2: 100% (14 Jan 2024 06:00) (94% - 100%)    Parameters below as of 14 Jan 2024 06:00  Patient On (Oxygen Delivery Method): room air    PHYSICAL EXAM:  General: NAD, jaundiced  HEENT: NC/AT, icteric sclera, EOMI, normal hearing, no oral lesions  Pulmonary: Nonlabored breathing, no respiratory distress, CTA-B  Cardiovascular: normal rate, no murmurs  Abdominal: soft, NT/ND, wound manager in place with dark green output. No leaks, no bleeding.   Extremities: WWP, no clubbing/cyanosis/edema  Neuro: A/O x3, CNs II-XII grossly intact, normal motor/sensation, no focal deficits  Pulses: palpable distal pulses    I&O's Detail    13 Jan 2024 07:01  -  14 Jan 2024 07:00  --------------------------------------------------------  IN:    Fat Emulsion (Fish Oil &amp; Plant Based) 20% Infusion: 291.2 mL    Oral Fluid: 1120 mL    TPN (Total Parenteral Nutrition): 3280 mL  Total IN: 4691.2 mL    OUT:    Drain (mL): 2275 mL    Voided (mL): 1775 mL  Total OUT: 4050 mL    Total NET: 641.2 mL    14 Jan 2024 07:01  -  14 Jan 2024 08:33  --------------------------------------------------------  IN:    TPN (Total Parenteral Nutrition): 214 mL  Total IN: 214 mL    OUT:    Voided (mL): 150 mL  Total OUT: 150 mL    Total NET: 64 mL    LABS:                        8.9    11.46 )-----------( 186      ( 13 Jan 2024 05:06 )             28.8     01-13    137  |  104  |  43<H>  ----------------------------<  149<H>  4.2   |  27  |  1.31<H>    Ca    8.2<L>      13 Jan 2024 05:57  Phos  3.3     01-13  Mg     2.2     01-13    TPro  8.1  /  Alb  2.5<L>  /  TBili  5.3<H>  /  DBili  3.6<H>  /  AST  106<H>  /  ALT  70<H>  /  AlkPhos  103  01-13    Urinalysis Basic - ( 13 Jan 2024 05:57 )    Color: x / Appearance: x / SG: x / pH: x  Gluc: 149 mg/dL / Ketone: x  / Bili: x / Urobili: x   Blood: x / Protein: x / Nitrite: x   Leuk Esterase: x / RBC: x / WBC x   Sq Epi: x / Non Sq Epi: x / Bacteria: x INTERVAL HPI/OVERNIGHT EVENTS:  ON: low grade tachy <110, extra melatonin x 1, tiny leak from side of appliance from increased pressure - patched w/ stoma paste and no further leak; Eakins with soft/formed output, not just liquid    STATUS POST:    6/27: Laparoscopic lysis of adhesions, converted to open lysis of adhesions, SBR x 3, temporary abdominal closure, 5L cryst, 1L 5% alb, 3 pRBC, 1 FFP, 1 plts  6/28: ex lap, removal of abthera, ileocolic resection, small bowel anastamosis, , 1.6 crystalloid, 250 5%, 175 uop   7/3: IR Gendel drained perihepatic aspiration of serous fluid.   7/5: RTOR exlap, washout, ileocolic resection with end ileostomy, blow hole colostomy, fistula, red rubber from ileostomy to small bowel anastomosis; vicryl bridging mesh; R JOYCE below ileostomy, L JOYCE at small bowel enterotomy repair; 500 LR, 500 5% albumin, 3u PRBC, 2 FFP, 400 UOP,   9/11: s/p perc cony    SUBJECTIVE:  Patient seen and examined by chief resident and team on AM rounds. Pt states he did not sleep well last night. Feels okay this morning though. No pain. No CP/SOB.     MEDICATIONS  (STANDING):  chlorhexidine 2% Cloths 1 Application(s) Topical <User Schedule>  cholestyramine Powder (Sugar-Free) 4 Gram(s) Oral daily  enoxaparin Injectable 90 milliGRAM(s) SubCutaneous every 12 hours  epoetin matt-epbx (RETACRIT) Injectable 48943 Unit(s) SubCutaneous every 7 days  ergocalciferol 34399 Unit(s) Oral <User Schedule>  gabapentin 100 milliGRAM(s) Oral at bedtime  glucagon  Injectable 1 milliGRAM(s) IntraMuscular once  hydrALAZINE Injectable 10 milliGRAM(s) IV Push every 6 hours  levothyroxine Injectable 30 MICROGram(s) IV Push at bedtime  lipid, fat emulsion (Fish Oil and Plant Based) 20% Infusion 0.54 Gm/kG/Day (20.83 mL/Hr) IV Continuous <Continuous>  lipid, fat emulsion (Fish Oil and Plant Based) 20% Infusion 0.54 Gm/kG/Day (20.83 mL/Hr) IV Continuous <Continuous>  melatonin 5 milliGRAM(s) Oral at bedtime  metoprolol tartrate Injectable 10 milliGRAM(s) IV Push every 6 hours  Parenteral Nutrition - Adult 1 Each TPN Continuous <Continuous>  Parenteral Nutrition - Adult 1 Each TPN Continuous <Continuous>  timolol 0.5% Solution 1 Drop(s) Both EYES daily  ursodiol Suspension 300 milliGRAM(s) Oral every 8 hours  venlafaxine XR. 150 milliGRAM(s) Oral daily    MEDICATIONS  (PRN):  chlorhexidine 0.12% Liquid 15 milliLiter(s) Swish and Spit two times a day PRN oral hygeine  EPINEPHrine   1 mG/mL (1:1,000) Topical Solution 1 Application(s) Topical every 24 hours PRN for wound bleeding  LORazepam   Injectable 1 milliGRAM(s) IV Push <User Schedule> PRN Anxiety  ondansetron Injectable 4 milliGRAM(s) IV Push every 6 hours PRN Nausea and/or Vomiting      Vital Signs Last 24 Hrs  T(C): 36.8 (14 Jan 2024 06:33), Max: 36.9 (13 Jan 2024 22:12)  T(F): 98.2 (14 Jan 2024 06:33), Max: 98.4 (13 Jan 2024 22:12)  HR: 97 (14 Jan 2024 06:00) (87 - 108)  BP: 132/62 (14 Jan 2024 06:00) (105/54 - 160/91)  BP(mean): 89 (14 Jan 2024 06:00) (70 - 114)  RR: 20 (14 Jan 2024 06:00) (20 - 34)  SpO2: 100% (14 Jan 2024 06:00) (94% - 100%)    Parameters below as of 14 Jan 2024 06:00  Patient On (Oxygen Delivery Method): room air    PHYSICAL EXAM:  General: NAD, jaundiced  HEENT: NC/AT, icteric sclera, EOMI, normal hearing, no oral lesions  Pulmonary: Nonlabored breathing, no respiratory distress, CTA-B  Cardiovascular: normal rate, no murmurs  Abdominal: soft, NT/ND, wound manager in place with dark green output. No leaks, no bleeding.   Extremities: WWP, no clubbing/cyanosis/edema  Neuro: A/O x3, CNs II-XII grossly intact, normal motor/sensation, no focal deficits  Pulses: palpable distal pulses    I&O's Detail    13 Jan 2024 07:01  -  14 Jan 2024 07:00  --------------------------------------------------------  IN:    Fat Emulsion (Fish Oil &amp; Plant Based) 20% Infusion: 291.2 mL    Oral Fluid: 1120 mL    TPN (Total Parenteral Nutrition): 3280 mL  Total IN: 4691.2 mL    OUT:    Drain (mL): 2275 mL    Voided (mL): 1775 mL  Total OUT: 4050 mL    Total NET: 641.2 mL    14 Jan 2024 07:01  -  14 Jan 2024 08:33  --------------------------------------------------------  IN:    TPN (Total Parenteral Nutrition): 214 mL  Total IN: 214 mL    OUT:    Voided (mL): 150 mL  Total OUT: 150 mL    Total NET: 64 mL    LABS:                        8.9    11.46 )-----------( 186      ( 13 Jan 2024 05:06 )             28.8     01-13    137  |  104  |  43<H>  ----------------------------<  149<H>  4.2   |  27  |  1.31<H>    Ca    8.2<L>      13 Jan 2024 05:57  Phos  3.3     01-13  Mg     2.2     01-13    TPro  8.1  /  Alb  2.5<L>  /  TBili  5.3<H>  /  DBili  3.6<H>  /  AST  106<H>  /  ALT  70<H>  /  AlkPhos  103  01-13    Urinalysis Basic - ( 13 Jan 2024 05:57 )    Color: x / Appearance: x / SG: x / pH: x  Gluc: 149 mg/dL / Ketone: x  / Bili: x / Urobili: x   Blood: x / Protein: x / Nitrite: x   Leuk Esterase: x / RBC: x / WBC x   Sq Epi: x / Non Sq Epi: x / Bacteria: x

## 2024-01-15 NOTE — PROGRESS NOTE ADULT - ASSESSMENT
A/p: 78M w PMH Crohn's, AFib/Flutter s/p DCCVs on amiodarone, remote ileocectomy and open appendectomy. Admitted (6/23) for SBO vs Crohns flare, s/p NGT decompression and s/p lap converted to open TRE, SBR x 3, left in discontinuity with abthera vac on (6/27), RTOR for ileocolic resection, small bowel anastomosis, and abdominal wall closure on (6/28), c/b fluid collection s/p IR aspiration of perihepatic fluid on (7/3), c/b wound dehiscence s/p RTOR exlap, washout, ileocolic resection with end ileostomy, blow hole colostomy, red rubber from ileostomy to small bowel anastomosis; vicryl bridging mesh on (7/5) transferred to SICU postoperatively for hemodynamic monitoring, with hospital course complicated by periods of severe ELVI and hyponatremia, which resolved but stepped back up for to SICU on (9/10) for acute AMS, intermittent hypoglycemia, AFib with RVR. Percutaneous Cholecystostomy placed on (9/11) for enlarged GB, PCT capped 10/5 and uncapped 10/25 for hyperbilirubinemia, Right brachial DVT, left basillic and cephalic superficial thrombus on duplex 11/2, CT 11/14 with ALDEN ground glass opacity of unclear etiology, completed empiric 7day cefepime course 11/22, rising T-bili of unclear etilogy, now with resolved candida glabrata fungemia, ELVI improving.     NEURO: Hx of depression: cont Effexor (halved dose in setting of renal dysfunx). Anxiety: Lorazepam PRN. Holistic consult for anxiety and insomnia. Gabapentin for restless leg syndrome.   HEENT: Timolol eye gtt added on 1/10 for additional systemic BB support in setting of malabsorption  CV: Hx of Afib and recent Aflutter alternating with sinus tachycardia (HR 100s),: Cont Lopressor to 10 q6h, added timolol eye ggt for attempted systemic affect. Started therapeutic Lovenox on 1/10. s/p previous DCCV, off Amiodarone and Lipitor due to persistent transaminitis. BLE duplex (1/5) no DVT, TTE  (7/18) - PASP 64mmHg, EF 65-70%. holding Losartan & norvasc because not absorbing PO meds, HTN: hydralazine 10 q6  Standing. TTE (1/4) LVEF 75%, mild/mod AR, PASP 40, RVEF wnl. Given Increased fluid in TPN will watch I/O over the next few days.   PULM: Atelectasis/dyspnea improved clinically: cont 1 hr of BiPAP every 4hr and nasal canula or room air. Encourage OOB and IS. CT from 11/14 with ALDEN ground glass opacity of unclear etiology. COVID negative. RVP negative. Completed 7d empiric cefepime 11/22 to cover for potential PNA.   GI/FEN: Low res, low fat, high protein diet. Cont Ensure max x1/day however pt likely not absorbing adequately due to proximal fistula. Folate, thiamine. PICC replaced 12/4, switched out PICC on 1/4. Continue TPN/lipids, 1L extra in TPN in order to make I/O even. 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 Discussed with Pharmacy and with patient. cont Octreotide & Cholestyramine 10/18. Transaminitis slowly improved, elevated T bili, s/p Perc Deb on 9/11, failed first capping trial 10/2023. Repeat capping trial 1/2/24, no elevation in Tbili to date; seen by Hepatology - MRCP 10/30 no obstruction, cont ursodiol, RUQ US 11/25 CBD 5mm, hepatic vessels patent. Cont Vit D weekly.   : Voids. ELVI improved: stopped famotidine given renal dysfunction. MARLO Duplex and RP US unremarkable, CK wnl.    ENDO: Hx hypothyroid: IV Synthroid,  Repeat thyroid studies WNL 1/3/24.   ID: currently not on any abx/antifungals. BCx 11/22 grew candida glabrata, likely CLABSI. s/p Caspo & completed Fluconazole course (12/1-12/9). Ophthalmology evaluated - normal ocular exam. Echo no vegetation. PICC replaced on 12/4. Cont Nystatin powder. // DC: caspofungin (11/24-12/1). empiric 7days cefepime (11/15-11/22), C. tertium, Lactobacillus from IR cx 7/3, and candida albicans, lactobacillus, vanc sensitive E faecium, vanc resistant E gallinarum, vanc resistant E casseliflavus, lactobacillus from OR cx 7/5. Completed course of abx with Imipenem (9/10-9/15). Imipenem (8/26--) imipenem (6/30-7/12, 7/23-7/30), Dapto (6/30-7/5 and 7/23-7/24). Empiric Dapto (8/23-24) and Cefepime (8/23-24). Up trending wbc 1/15, full work up with CxR, UA, Blood cultures.  PPX: SCDs, HOLD Lovenox therapeutic for Afib  HEME: Anemia - continue Epogen weekly. S/p Iron Sucrose 200 qd x 3 days for chronic anemia. 1 PRBC 1/15 for Hb drop.   LINES: PIVs, New RUE PICC placed (1/5--) will plan to switch PICC once a month, secondary to history of fungemia // DC: LUE PICC (9/1-11/1 ), RUE PICC: (11/1-11/24), LUE PICC (12/4-1/5)  WOUNDS/DRAINS: Abdominal wound and fistula with wound manager in place dressing change daily. Had recurrent punctate bleeding at wound bed, s/p placed surgicel, attempt to stitch, electrocautery and epi soaked gauze. Cont epinephrine soaked gauze at bedside. RLQ Ostomy. IR perc deb tube capped 1/2 // DC: L Clay drain.  PT: Ambulate as tolerated OOB to chair daily.  DISPO: SICU due to complicated hospital course. Goal to dc home in 2weeks: Spoke with case management and wound care on 1/10 to alert them of possible discharge home with services the week of 1/22 vs 1/29.

## 2024-01-15 NOTE — PROGRESS NOTE ADULT - SUBJECTIVE AND OBJECTIVE BOX
STATUS POST:    6/27: Laparoscopic lysis of adhesions, converted to open lysis of adhesions, SBR x 3, temporary abdominal closure, 5L cryst, 1L 5% alb, 3 pRBC, 1 FFP, 1 plts  6/28: ex lap, removal of abthera, ileocolic resection, small bowel anastamosis, , 1.6 crystalloid, 250 5%, 175 uop   7/3: IR Gendel drained perihepatic aspiration of serous fluid.   7/5: RTOR exlap, washout, ileocolic resection with end ileostomy, blow hole colostomy, fistula, red rubber from ileostomy to small bowel anastomosis; vicryl bridging mesh; R JOYCE below ileostomy, L JOYCE at small bowel enterotomy repair; 500 LR, 500 5% albumin, 3u PRBC, 2 FFP, 400 UOP,   9/11: s/p perc cony.    SUBJECTIVE: Pt seen and examined at bedside this am by surgery team. Patient is lying comfortably in bed with no complaints. Tolerating diet, pain well controlled with current regimen. Patient denies fever, nausea, vomiting, chest pain, and shortness of breath. Ambulating daily. Had some minor bleeding of wound bed over weekend - resolved with pressure and gauze soaked with epi.       MEDICATIONS  (STANDING):  chlorhexidine 2% Cloths 1 Application(s) Topical <User Schedule>  cholestyramine Powder (Sugar-Free) 4 Gram(s) Oral daily  epoetin matt-epbx (RETACRIT) Injectable 22860 Unit(s) SubCutaneous every 7 days  ergocalciferol 91851 Unit(s) Oral <User Schedule>  gabapentin 100 milliGRAM(s) Oral at bedtime  glucagon  Injectable 1 milliGRAM(s) IntraMuscular once  hydrALAZINE Injectable 10 milliGRAM(s) IV Push every 6 hours  levothyroxine Injectable 30 MICROGram(s) IV Push at bedtime  lipid, fat emulsion (Fish Oil and Plant Based) 20% Infusion 0.54 Gm/kG/Day (20.83 mL/Hr) IV Continuous <Continuous>  lipid, fat emulsion (Fish Oil and Plant Based) 20% Infusion 0.54 Gm/kG/Day (20.83 mL/Hr) IV Continuous <Continuous>  melatonin 5 milliGRAM(s) Oral at bedtime  metoprolol tartrate Injectable 10 milliGRAM(s) IV Push every 6 hours  Parenteral Nutrition - Adult 1 Each TPN Continuous <Continuous>  Parenteral Nutrition - Adult 1 Each TPN Continuous <Continuous>  timolol 0.5% Solution 1 Drop(s) Both EYES daily  ursodiol Suspension 300 milliGRAM(s) Oral every 8 hours  venlafaxine XR. 150 milliGRAM(s) Oral daily    MEDICATIONS  (PRN):  chlorhexidine 0.12% Liquid 15 milliLiter(s) Swish and Spit two times a day PRN oral hygeine  EPINEPHrine   1 mG/mL (1:1,000) Topical Solution 1 Application(s) Topical every 24 hours PRN for wound bleeding  LORazepam   Injectable 1 milliGRAM(s) IV Push <User Schedule> PRN Anxiety  ondansetron Injectable 4 milliGRAM(s) IV Push every 6 hours PRN Nausea and/or Vomiting      Vital Signs Last 24 Hrs  T(C): 36.1 (15 Sukhdev 2024 05:43), Max: 37.1 (14 Jan 2024 22:12)  T(F): 97 (15 Sukhdev 2024 05:43), Max: 98.8 (14 Jan 2024 22:12)  HR: 104 (15 Sukhdev 2024 07:00) (94 - 108)  BP: 161/79 (15 Sukhdev 2024 07:00) (127/58 - 161/79)  BP(mean): 112 (15 Sukhdev 2024 07:00) (84 - 112)  RR: 21 (15 Sukhdev 2024 07:00) (20 - 26)  SpO2: 100% (15 Sukhdev 2024 07:00) (95% - 100%)    Parameters below as of 15 Sukhdev 2024 05:45  Patient On (Oxygen Delivery Method): BiPAP/CPAP    O2 Concentration (%): 40    Physical Exam  General: Patient is doing well and lying in bed comfortably  Constitutional: alert and awake, A&O x 4  Pulm: no respiratory distress  CV: Regular rate and rhythm, in and out of afib/flutter  Abd: soft, nontender, nondistended. No rebound, no guarding. Wound manager in place, no bleeding, no leaks. soft semi-formed output, brown in color  Extremities: warm, well perfused    I&O's Detail    14 Jan 2024 07:01  -  15 Sukhdev 2024 07:00  --------------------------------------------------------  IN:    Fat Emulsion (Fish Oil &amp; Plant Based) 20% Infusion: 228.8 mL    Oral Fluid: 1020 mL    TPN (Total Parenteral Nutrition): 4282 mL  Total IN: 5530.8 mL    OUT:    Drain (mL): 0 mL    Drain (mL): 1475 mL    Drain (mL): 125 mL    Voided (mL): 1550 mL  Total OUT: 3150 mL    Total NET: 2380.8 mL        LABS:                        7.7    12.76 )-----------( 175      ( 15 Sukhdev 2024 06:44 )             23.9     01-15    137  |  103  |  35<H>  ----------------------------<  143<H>  4.2   |  27  |  1.29    Ca    7.8<L>      15 Sukhdev 2024 05:30  Phos  2.9     01-15  Mg     2.1     01-15    TPro  8.1  /  Alb  2.5<L>  /  TBili  5.4<H>  /  DBili  3.7<H>  /  AST  99<H>  /  ALT  67<H>  /  AlkPhos  101  01-15      Urinalysis Basic - ( 15 Sukhdev 2024 05:30 )    Color: x / Appearance: x / SG: x / pH: x  Gluc: 143 mg/dL / Ketone: x  / Bili: x / Urobili: x   Blood: x / Protein: x / Nitrite: x   Leuk Esterase: x / RBC: x / WBC x   Sq Epi: x / Non Sq Epi: x / Bacteria: x     STATUS POST:    6/27: Laparoscopic lysis of adhesions, converted to open lysis of adhesions, SBR x 3, temporary abdominal closure, 5L cryst, 1L 5% alb, 3 pRBC, 1 FFP, 1 plts  6/28: ex lap, removal of abthera, ileocolic resection, small bowel anastamosis, , 1.6 crystalloid, 250 5%, 175 uop   7/3: IR Gendel drained perihepatic aspiration of serous fluid.   7/5: RTOR exlap, washout, ileocolic resection with end ileostomy, blow hole colostomy, fistula, red rubber from ileostomy to small bowel anastomosis; vicryl bridging mesh; R JOYCE below ileostomy, L JOYCE at small bowel enterotomy repair; 500 LR, 500 5% albumin, 3u PRBC, 2 FFP, 400 UOP,   9/11: s/p perc cony.    SUBJECTIVE: Pt seen and examined at bedside this am by surgery team. Patient is lying comfortably in bed with no complaints. Tolerating diet, pain well controlled with current regimen. Patient denies fever, nausea, vomiting, chest pain, and shortness of breath. Ambulating daily. Had some minor bleeding of wound bed over weekend - resolved with pressure and gauze soaked with epi.       MEDICATIONS  (STANDING):  chlorhexidine 2% Cloths 1 Application(s) Topical <User Schedule>  cholestyramine Powder (Sugar-Free) 4 Gram(s) Oral daily  epoetin matt-epbx (RETACRIT) Injectable 00887 Unit(s) SubCutaneous every 7 days  ergocalciferol 89554 Unit(s) Oral <User Schedule>  gabapentin 100 milliGRAM(s) Oral at bedtime  glucagon  Injectable 1 milliGRAM(s) IntraMuscular once  hydrALAZINE Injectable 10 milliGRAM(s) IV Push every 6 hours  levothyroxine Injectable 30 MICROGram(s) IV Push at bedtime  lipid, fat emulsion (Fish Oil and Plant Based) 20% Infusion 0.54 Gm/kG/Day (20.83 mL/Hr) IV Continuous <Continuous>  lipid, fat emulsion (Fish Oil and Plant Based) 20% Infusion 0.54 Gm/kG/Day (20.83 mL/Hr) IV Continuous <Continuous>  melatonin 5 milliGRAM(s) Oral at bedtime  metoprolol tartrate Injectable 10 milliGRAM(s) IV Push every 6 hours  Parenteral Nutrition - Adult 1 Each TPN Continuous <Continuous>  Parenteral Nutrition - Adult 1 Each TPN Continuous <Continuous>  timolol 0.5% Solution 1 Drop(s) Both EYES daily  ursodiol Suspension 300 milliGRAM(s) Oral every 8 hours  venlafaxine XR. 150 milliGRAM(s) Oral daily    MEDICATIONS  (PRN):  chlorhexidine 0.12% Liquid 15 milliLiter(s) Swish and Spit two times a day PRN oral hygeine  EPINEPHrine   1 mG/mL (1:1,000) Topical Solution 1 Application(s) Topical every 24 hours PRN for wound bleeding  LORazepam   Injectable 1 milliGRAM(s) IV Push <User Schedule> PRN Anxiety  ondansetron Injectable 4 milliGRAM(s) IV Push every 6 hours PRN Nausea and/or Vomiting      Vital Signs Last 24 Hrs  T(C): 36.1 (15 Sukhdev 2024 05:43), Max: 37.1 (14 Jan 2024 22:12)  T(F): 97 (15 Sukhdev 2024 05:43), Max: 98.8 (14 Jan 2024 22:12)  HR: 104 (15 Sukhdev 2024 07:00) (94 - 108)  BP: 161/79 (15 Sukhdev 2024 07:00) (127/58 - 161/79)  BP(mean): 112 (15 Sukhdev 2024 07:00) (84 - 112)  RR: 21 (15 Sukhdev 2024 07:00) (20 - 26)  SpO2: 100% (15 Sukhdev 2024 07:00) (95% - 100%)    Parameters below as of 15 Sukhdev 2024 05:45  Patient On (Oxygen Delivery Method): BiPAP/CPAP    O2 Concentration (%): 40    Physical Exam  General: Patient is doing well and lying in bed comfortably  Constitutional: alert and awake, A&O x 4  Pulm: no respiratory distress  CV: Regular rate and rhythm, in and out of afib/flutter  Abd: soft, nontender, nondistended. No rebound, no guarding. Wound manager in place, no bleeding, no leaks. soft semi-formed output, brown in color  Extremities: warm, well perfused    I&O's Detail    14 Jan 2024 07:01  -  15 Sukhdev 2024 07:00  --------------------------------------------------------  IN:    Fat Emulsion (Fish Oil &amp; Plant Based) 20% Infusion: 228.8 mL    Oral Fluid: 1020 mL    TPN (Total Parenteral Nutrition): 4282 mL  Total IN: 5530.8 mL    OUT:    Drain (mL): 0 mL    Drain (mL): 1475 mL    Drain (mL): 125 mL    Voided (mL): 1550 mL  Total OUT: 3150 mL    Total NET: 2380.8 mL        LABS:                        7.7    12.76 )-----------( 175      ( 15 Sukhdev 2024 06:44 )             23.9     01-15    137  |  103  |  35<H>  ----------------------------<  143<H>  4.2   |  27  |  1.29    Ca    7.8<L>      15 Sukhdev 2024 05:30  Phos  2.9     01-15  Mg     2.1     01-15    TPro  8.1  /  Alb  2.5<L>  /  TBili  5.4<H>  /  DBili  3.7<H>  /  AST  99<H>  /  ALT  67<H>  /  AlkPhos  101  01-15      Urinalysis Basic - ( 15 Sukhdev 2024 05:30 )    Color: x / Appearance: x / SG: x / pH: x  Gluc: 143 mg/dL / Ketone: x  / Bili: x / Urobili: x   Blood: x / Protein: x / Nitrite: x   Leuk Esterase: x / RBC: x / WBC x   Sq Epi: x / Non Sq Epi: x / Bacteria: x     STATUS POST:    6/27: Laparoscopic lysis of adhesions, converted to open lysis of adhesions, SBR x 3, temporary abdominal closure, 5L cryst, 1L 5% alb, 3 pRBC, 1 FFP, 1 plts  6/28: ex lap, removal of abthera, ileocolic resection, small bowel anastamosis, , 1.6 crystalloid, 250 5%, 175 uop   7/3: IR Gendel drained perihepatic aspiration of serous fluid.   7/5: RTOR exlap, washout, ileocolic resection with end ileostomy, blow hole colostomy, fistula, red rubber from ileostomy to small bowel anastomosis; vicryl bridging mesh; R JOYCE below ileostomy, L JOYCE at small bowel enterotomy repair; 500 LR, 500 5% albumin, 3u PRBC, 2 FFP, 400 UOP,   9/11: s/p perc cony.    SUBJECTIVE: Pt seen and examined at bedside this am by surgery team. Patient is lying comfortably in bed with no complaints. Tolerating diet, pain well controlled with current regimen. Patient denies fever, nausea, vomiting, chest pain, and shortness of breath. Ambulating daily. Had some minor bleeding of wound bed over weekend - resolved with pressure and gauze soaked with epi.       MEDICATIONS  (STANDING):  chlorhexidine 2% Cloths 1 Application(s) Topical <User Schedule>  cholestyramine Powder (Sugar-Free) 4 Gram(s) Oral daily  epoetin matt-epbx (RETACRIT) Injectable 76841 Unit(s) SubCutaneous every 7 days  ergocalciferol 12867 Unit(s) Oral <User Schedule>  gabapentin 100 milliGRAM(s) Oral at bedtime  glucagon  Injectable 1 milliGRAM(s) IntraMuscular once  hydrALAZINE Injectable 10 milliGRAM(s) IV Push every 6 hours  levothyroxine Injectable 30 MICROGram(s) IV Push at bedtime  lipid, fat emulsion (Fish Oil and Plant Based) 20% Infusion 0.54 Gm/kG/Day (20.83 mL/Hr) IV Continuous <Continuous>  lipid, fat emulsion (Fish Oil and Plant Based) 20% Infusion 0.54 Gm/kG/Day (20.83 mL/Hr) IV Continuous <Continuous>  melatonin 5 milliGRAM(s) Oral at bedtime  metoprolol tartrate Injectable 10 milliGRAM(s) IV Push every 6 hours  Parenteral Nutrition - Adult 1 Each TPN Continuous <Continuous>  Parenteral Nutrition - Adult 1 Each TPN Continuous <Continuous>  timolol 0.5% Solution 1 Drop(s) Both EYES daily  ursodiol Suspension 300 milliGRAM(s) Oral every 8 hours  venlafaxine XR. 150 milliGRAM(s) Oral daily    MEDICATIONS  (PRN):  chlorhexidine 0.12% Liquid 15 milliLiter(s) Swish and Spit two times a day PRN oral hygeine  EPINEPHrine   1 mG/mL (1:1,000) Topical Solution 1 Application(s) Topical every 24 hours PRN for wound bleeding  LORazepam   Injectable 1 milliGRAM(s) IV Push <User Schedule> PRN Anxiety  ondansetron Injectable 4 milliGRAM(s) IV Push every 6 hours PRN Nausea and/or Vomiting      Vital Signs Last 24 Hrs  T(C): 36.1 (15 Sukhdev 2024 05:43), Max: 37.1 (14 Jan 2024 22:12)  T(F): 97 (15 Sukhdev 2024 05:43), Max: 98.8 (14 Jan 2024 22:12)  HR: 104 (15 Sukhdev 2024 07:00) (94 - 108)  BP: 161/79 (15 Sukhdev 2024 07:00) (127/58 - 161/79)  BP(mean): 112 (15 Sukhdev 2024 07:00) (84 - 112)  RR: 21 (15 Sukhdev 2024 07:00) (20 - 26)  SpO2: 100% (15 Sukhdev 2024 07:00) (95% - 100%)    Parameters below as of 15 Sukhdev 2024 05:45  Patient On (Oxygen Delivery Method): BiPAP/CPAP    O2 Concentration (%): 40    Physical Exam  General: Patient is doing well and lying in bed comfortably  Constitutional: alert and awake, A&O x 4  Pulm: no respiratory distress  CV: Regular rate and rhythm, in and out of afib/flutter  Abd: soft, nontender, nondistended. No rebound, no guarding. Wound manager in place, no bleeding, no leaks. soft semi-formed output, brown in color  Extremities: warm, well perfused    I&O's Detail    14 Jan 2024 07:01  -  15 Sukhdev 2024 07:00  --------------------------------------------------------  IN:    Fat Emulsion (Fish Oil &amp; Plant Based) 20% Infusion: 228.8 mL    Oral Fluid: 1020 mL    TPN (Total Parenteral Nutrition): 4282 mL  Total IN: 5530.8 mL    OUT:    Drain (mL): 0 mL    Drain (mL): 1475 mL    Drain (mL): 125 mL    Voided (mL): 1550 mL  Total OUT: 3150 mL    Total NET: 2380.8 mL        LABS:                        7.7    12.76 )-----------( 175      ( 15 Sukhdev 2024 06:44 )             23.9     01-15    137  |  103  |  35<H>  ----------------------------<  143<H>  4.2   |  27  |  1.29    Ca    7.8<L>      15 Sukhdev 2024 05:30  Phos  2.9     01-15  Mg     2.1     01-15    TPro  8.1  /  Alb  2.5<L>  /  TBili  5.4<H>  /  DBili  3.7<H>  /  AST  99<H>  /  ALT  67<H>  /  AlkPhos  101  01-15      Urinalysis Basic - ( 15 Sukhdev 2024 05:30 )    Color: x / Appearance: x / SG: x / pH: x  Gluc: 143 mg/dL / Ketone: x  / Bili: x / Urobili: x   Blood: x / Protein: x / Nitrite: x   Leuk Esterase: x / RBC: x / WBC x   Sq Epi: x / Non Sq Epi: x / Bacteria: x

## 2024-01-15 NOTE — PROGRESS NOTE ADULT - ASSESSMENT
78 year old male with a PMHx of Crohn's, AFib/Flutter s/p DCCVs on amiodarone, remote ileocectomy and open appendectomy. Admitted (6/23) for SBO vs Crohns flare, s/p NGT decompression and s/p lap converted to open TRE, SBR x 3, left in discontinuity with abthera vac on (6/27), RTOR for ileocolic resection, small bowel anastomosis, and abdominal wall closure on (6/28), c/b fluid collection s/p IR aspiration of perihepatic fluid on (7/3), c/b wound dehiscence s/p RTOR exlap, washout, ileocolic resection with end ileostomy, blow hole colostomy, red rubber from ileostomy to small bowel anastomosis; vicryl bridging mesh on (7/5) transferred to SICU postoperatively for hemodynamic monitoring, with hospital course complicated by periods of severe ELVI and hyponatremia, which resolved but stepped back up for to SICU on (9/10) for acute AMS, intermittent hypoglycemia, AFib with RVR. Percutaneous Cholecystostomy placed on (9/11) for enlarged GB, PCT capped 10/5 and uncapped 10/25 for hyperbilirubinemia, Right brachial DVT, left basillic and cephalic superficial thrombus on duplex 11/2, CT 11/14 with ALDEN ground glass opacity of unclear etiology, completed empiric 7day cefepime course 11/22, now w resolved candida glabrata fungemia, ELVI improving. Presenting with some bleeding at ileostomy for the weekend, stable vitals. Net positive but not volume overloaded on physical exam.     - Repeat CBC and transfuse PRBC PRN  - Continue TPN   - Continue wound care   - Follow up on Gattex approval   - Team 5 will follow   - Plan discussed with Dr Peterson

## 2024-01-15 NOTE — PROGRESS NOTE ADULT - ATTENDING COMMENTS
UC, AF, SBR, ileocolic resection, ileostomy now with leukocytosis, anemia  he c/o SOB  physical as above  POCUS with left lower lobe consolidation  CXR also  have asked ID to reconsult for antibiotic recommendations  increase BIPAP to one hour q 4h  transfuse a unit PRBC  continue metoprolol   continue eliquis

## 2024-01-15 NOTE — PROGRESS NOTE ADULT - SUBJECTIVE AND OBJECTIVE BOX
1/15: TPN reordered, Hold LVX for AM hg 7.1 drawn from line, repeat CBC 7.7. Lovenox held. PRBC given.   Refers SOB, fatigue. Uptrending wbc (12), full work up with CxR: left basilar opacity, likely atelectasis versus pneumonia.   Denies nausea, vomiting, abdominal pain    POCUS with LLL probably atelectasis.     ON: 50ccs dark bloody drainage from ileostomy site overnight. Pressure held. No obvious site of bleed. Stopped with manual pressure.     Meds: allopurinol 300 mg oral tablet: 1 tab(s) orally once a day  amiodarone 200 mg oral tablet: 1 orally once a day  metoprolol tartrate 50 mg oral tablet: 1 orally 2 times a day  rosuvastatin 5 mg oral tablet: 1 tab(s) orally once a day  Synthroid 50 mcg (0.05 mg) oral tablet: 1 tab(s) orally once a day  venlafaxine 150 mg oral tablet, extended release: 1 tab(s) orally 2 times a day      Vitals past 24h:  T(F): 97.6 (13:29), Max: 97.6 (13:29)  HR: 98 (13:29) (98 - 98)  BP: 194/72 (13:29) (194/72 - 194/72)  RR: 20 (13:29) (20 - 20)  SpO2: 98% (13:29) (98% - 98%)   (23 Jun 2023 16:52)      PAST MEDICAL & SURGICAL HISTORY:  Essential hypertension  Hypertension      Atrial fibrillation  s/p cardioversion 2010 and 2014  Pt. reports 4 DCCV  Now on Amiodarone 200mg PO bid and Eliquis 5mg PO bid  Last DCCV 4 yrs ago at [facility]  Crohn's disease  s/p partial resection of ileum  Hyperlipidemia  Hypothyroidism  History of depression  On Venlafaxine ER 150mg PO bid  Junctional rhythm  H/O knee surger  History of cataract surgery    ROS: See HPI    MEDICATIONS  (STANDING):  chlorhexidine 2% Cloths 1 Application(s) Topical <User Schedule>  cholestyramine Powder (Sugar-Free) 4 Gram(s) Oral daily  epoetin matt-epbx (RETACRIT) Injectable 86661 Unit(s) SubCutaneous every 7 days  ergocalciferol 36270 Unit(s) Oral <User Schedule>  gabapentin 100 milliGRAM(s) Oral at bedtime  glucagon  Injectable 1 milliGRAM(s) IntraMuscular once  hydrALAZINE Injectable 10 milliGRAM(s) IV Push every 6 hours  levothyroxine Injectable 30 MICROGram(s) IV Push at bedtime  lipid, fat emulsion (Fish Oil and Plant Based) 20% Infusion 0.54 Gm/kG/Day (20.83 mL/Hr) IV Continuous <Continuous>  lipid, fat emulsion (Fish Oil and Plant Based) 20% Infusion 0.54 Gm/kG/Day (20.83 mL/Hr) IV Continuous <Continuous>  melatonin 5 milliGRAM(s) Oral at bedtime  metoprolol tartrate Injectable 10 milliGRAM(s) IV Push every 6 hours  Parenteral Nutrition - Adult 1 Each TPN Continuous <Continuous>  Parenteral Nutrition - Adult 1 Each TPN Continuous <Continuous>  timolol 0.5% Solution 1 Drop(s) Both EYES daily  ursodiol Suspension 300 milliGRAM(s) Oral every 8 hours  venlafaxine XR. 150 milliGRAM(s) Oral daily    MEDICATIONS  (PRN):  chlorhexidine 0.12% Liquid 15 milliLiter(s) Swish and Spit two times a day PRN oral hygeine  EPINEPHrine   1 mG/mL (1:1,000) Topical Solution 1 Application(s) Topical every 24 hours PRN for wound bleeding  LORazepam   Injectable 1 milliGRAM(s) IV Push <User Schedule> PRN Anxiety  ondansetron Injectable 4 milliGRAM(s) IV Push every 6 hours PRN Nausea and/or Vomiting    Allergies  penicillin (Angioedema)  Intoleranes    SOCIAL HISTORY:  Smoke: Never Smoker  EtOH: occasional    FAMILY HISTORY:    Vital Signs Last 24 Hrs  T(C): 36.1 (15 Sukhdev 2024 05:43), Max: 37.1 (14 Jan 2024 22:12)  T(F): 97 (15 Sukhdev 2024 05:43), Max: 98.8 (14 Jan 2024 22:12)  HR: 104 (15 Sukhdev 2024 07:00) (94 - 108)  BP: 161/79 (15 Sukhdev 2024 07:00) (127/58 - 161/79)  BP(mean): 112 (15 Sukhdev 2024 07:00) (84 - 112)  RR: 21 (15 Sukhdev 2024 07:00) (20 - 26)  SpO2: 100% (15 Sukhdev 2024 07:00) (95% - 100%)    Parameters below as of 15 Sukhdev 2024 05:45  Patient On (Oxygen Delivery Method): BiPAP/CPAP    O2 Concentration (%): 40    PHYSICAL EXAM    Physical Exam  General: Patient is doing well and lying in bed comfortably  Constitutional: alert and awake, A&O x 4  Pulm: no respiratory distress  CV: Regular rate and rhythm, in and out of afib/flutter  Abd: soft, nontender, nondistended. No rebound, no guarding. Wound manager in place, no bleeding, no leaks. soft semi-formed output, brown in color  Extremities: warm, well perfused    LABS:                        7.7    12.76 )-----------( 175      ( 15 Sukhdev 2024 06:44 )             23.9     01-15    137  |  103  |  35<H>  ----------------------------<  143<H>  4.2   |  27  |  1.29    Ca    7.8<L>      15 Sukhdev 2024 05:30  Phos  2.9     01-15  Mg     2.1     01-15    TPro  8.1  /  Alb  2.5<L>  /  TBili  5.4<H>  /  DBili  3.7<H>  /  AST  99<H>  /  ALT  67<H>  /  AlkPhos  101  01-15      Urinalysis Basic - ( 15 Sukhdev 2024 05:30 )    Color: x / Appearance: x / SG: x / pH: x  Gluc: 143 mg/dL / Ketone: x  / Bili: x / Urobili: x   Blood: x / Protein: x / Nitrite: x   Leuk Esterase: x / RBC: x / WBC x   Sq Epi: x / Non Sq Epi: x / Bacteria: x      RADIOLOGY & ADDITIONAL STUDIES:    Assessment and Plan:  78yMale 1/15: TPN reordered, Hold LVX for AM hg 7.1 drawn from line, repeat CBC 7.7. Lovenox held. PRBC given.   Refers SOB, fatigue. Uptrending wbc (12), full work up with CxR: left basilar opacity, likely atelectasis versus pneumonia.   Denies nausea, vomiting, abdominal pain    POCUS with LLL probably atelectasis.     ON: 50ccs dark bloody drainage from ileostomy site overnight. Pressure held. No obvious site of bleed. Stopped with manual pressure.     Meds: allopurinol 300 mg oral tablet: 1 tab(s) orally once a day  amiodarone 200 mg oral tablet: 1 orally once a day  metoprolol tartrate 50 mg oral tablet: 1 orally 2 times a day  rosuvastatin 5 mg oral tablet: 1 tab(s) orally once a day  Synthroid 50 mcg (0.05 mg) oral tablet: 1 tab(s) orally once a day  venlafaxine 150 mg oral tablet, extended release: 1 tab(s) orally 2 times a day      Vitals past 24h:  T(F): 97.6 (13:29), Max: 97.6 (13:29)  HR: 98 (13:29) (98 - 98)  BP: 194/72 (13:29) (194/72 - 194/72)  RR: 20 (13:29) (20 - 20)  SpO2: 98% (13:29) (98% - 98%)   (23 Jun 2023 16:52)      PAST MEDICAL & SURGICAL HISTORY:  Essential hypertension  Hypertension      Atrial fibrillation  s/p cardioversion 2010 and 2014  Pt. reports 4 DCCV  Now on Amiodarone 200mg PO bid and Eliquis 5mg PO bid  Last DCCV 4 yrs ago at [facility]  Crohn's disease  s/p partial resection of ileum  Hyperlipidemia  Hypothyroidism  History of depression  On Venlafaxine ER 150mg PO bid  Junctional rhythm  H/O knee surger  History of cataract surgery    ROS: See HPI    MEDICATIONS  (STANDING):  chlorhexidine 2% Cloths 1 Application(s) Topical <User Schedule>  cholestyramine Powder (Sugar-Free) 4 Gram(s) Oral daily  epoetin matt-epbx (RETACRIT) Injectable 77028 Unit(s) SubCutaneous every 7 days  ergocalciferol 48600 Unit(s) Oral <User Schedule>  gabapentin 100 milliGRAM(s) Oral at bedtime  glucagon  Injectable 1 milliGRAM(s) IntraMuscular once  hydrALAZINE Injectable 10 milliGRAM(s) IV Push every 6 hours  levothyroxine Injectable 30 MICROGram(s) IV Push at bedtime  lipid, fat emulsion (Fish Oil and Plant Based) 20% Infusion 0.54 Gm/kG/Day (20.83 mL/Hr) IV Continuous <Continuous>  lipid, fat emulsion (Fish Oil and Plant Based) 20% Infusion 0.54 Gm/kG/Day (20.83 mL/Hr) IV Continuous <Continuous>  melatonin 5 milliGRAM(s) Oral at bedtime  metoprolol tartrate Injectable 10 milliGRAM(s) IV Push every 6 hours  Parenteral Nutrition - Adult 1 Each TPN Continuous <Continuous>  Parenteral Nutrition - Adult 1 Each TPN Continuous <Continuous>  timolol 0.5% Solution 1 Drop(s) Both EYES daily  ursodiol Suspension 300 milliGRAM(s) Oral every 8 hours  venlafaxine XR. 150 milliGRAM(s) Oral daily    MEDICATIONS  (PRN):  chlorhexidine 0.12% Liquid 15 milliLiter(s) Swish and Spit two times a day PRN oral hygeine  EPINEPHrine   1 mG/mL (1:1,000) Topical Solution 1 Application(s) Topical every 24 hours PRN for wound bleeding  LORazepam   Injectable 1 milliGRAM(s) IV Push <User Schedule> PRN Anxiety  ondansetron Injectable 4 milliGRAM(s) IV Push every 6 hours PRN Nausea and/or Vomiting    Allergies  penicillin (Angioedema)  Intoleranes    SOCIAL HISTORY:  Smoke: Never Smoker  EtOH: occasional    FAMILY HISTORY:    Vital Signs Last 24 Hrs  T(C): 36.1 (15 Sukhdev 2024 05:43), Max: 37.1 (14 Jan 2024 22:12)  T(F): 97 (15 Sukhdev 2024 05:43), Max: 98.8 (14 Jan 2024 22:12)  HR: 104 (15 Sukhdev 2024 07:00) (94 - 108)  BP: 161/79 (15 Sukhdev 2024 07:00) (127/58 - 161/79)  BP(mean): 112 (15 Sukhdev 2024 07:00) (84 - 112)  RR: 21 (15 Sukhdev 2024 07:00) (20 - 26)  SpO2: 100% (15 Sukhdev 2024 07:00) (95% - 100%)    Parameters below as of 15 Sukhdev 2024 05:45  Patient On (Oxygen Delivery Method): BiPAP/CPAP    O2 Concentration (%): 40    PHYSICAL EXAM    Physical Exam  General: Patient is doing well and lying in bed comfortably  Constitutional: alert and awake, A&O x 4  Pulm: no respiratory distress  CV: Regular rate and rhythm, in and out of afib/flutter  Abd: soft, nontender, nondistended. No rebound, no guarding. Wound manager in place, no bleeding, no leaks. soft semi-formed output, brown in color  Extremities: warm, well perfused    LABS:                        7.7    12.76 )-----------( 175      ( 15 Sukhdev 2024 06:44 )             23.9     01-15    137  |  103  |  35<H>  ----------------------------<  143<H>  4.2   |  27  |  1.29    Ca    7.8<L>      15 Sukhdev 2024 05:30  Phos  2.9     01-15  Mg     2.1     01-15    TPro  8.1  /  Alb  2.5<L>  /  TBili  5.4<H>  /  DBili  3.7<H>  /  AST  99<H>  /  ALT  67<H>  /  AlkPhos  101  01-15      Urinalysis Basic - ( 15 Sukhdev 2024 05:30 )    Color: x / Appearance: x / SG: x / pH: x  Gluc: 143 mg/dL / Ketone: x  / Bili: x / Urobili: x   Blood: x / Protein: x / Nitrite: x   Leuk Esterase: x / RBC: x / WBC x   Sq Epi: x / Non Sq Epi: x / Bacteria: x      RADIOLOGY & ADDITIONAL STUDIES:    Assessment and Plan:  78yMale 1/15: TPN reordered, Hold LVX for AM hg 7.1 drawn from line, repeat CBC 7.7. Lovenox held. PRBC given.   Refers SOB, fatigue. Uptrending wbc (12), full work up with CxR: left basilar opacity, likely atelectasis versus pneumonia.   Denies nausea, vomiting, abdominal pain    POCUS with LLL probably atelectasis.     ON: 50ccs dark bloody drainage from ileostomy site overnight. Pressure held. No obvious site of bleed. Stopped with manual pressure.     Meds: allopurinol 300 mg oral tablet: 1 tab(s) orally once a day  amiodarone 200 mg oral tablet: 1 orally once a day  metoprolol tartrate 50 mg oral tablet: 1 orally 2 times a day  rosuvastatin 5 mg oral tablet: 1 tab(s) orally once a day  Synthroid 50 mcg (0.05 mg) oral tablet: 1 tab(s) orally once a day  venlafaxine 150 mg oral tablet, extended release: 1 tab(s) orally 2 times a day      Vitals past 24h:  T(F): 97.6 (13:29), Max: 97.6 (13:29)  HR: 98 (13:29) (98 - 98)  BP: 194/72 (13:29) (194/72 - 194/72)  RR: 20 (13:29) (20 - 20)  SpO2: 98% (13:29) (98% - 98%)   (23 Jun 2023 16:52)      PAST MEDICAL & SURGICAL HISTORY:  Essential hypertension  Hypertension      Atrial fibrillation  s/p cardioversion 2010 and 2014  Pt. reports 4 DCCV  Now on Amiodarone 200mg PO bid and Eliquis 5mg PO bid  Last DCCV 4 yrs ago at [facility]  Crohn's disease  s/p partial resection of ileum  Hyperlipidemia  Hypothyroidism  History of depression  On Venlafaxine ER 150mg PO bid  Junctional rhythm  H/O knee surger  History of cataract surgery    ROS: See HPI    MEDICATIONS  (STANDING):  chlorhexidine 2% Cloths 1 Application(s) Topical <User Schedule>  cholestyramine Powder (Sugar-Free) 4 Gram(s) Oral daily  epoetin matt-epbx (RETACRIT) Injectable 58394 Unit(s) SubCutaneous every 7 days  ergocalciferol 99505 Unit(s) Oral <User Schedule>  gabapentin 100 milliGRAM(s) Oral at bedtime  glucagon  Injectable 1 milliGRAM(s) IntraMuscular once  hydrALAZINE Injectable 10 milliGRAM(s) IV Push every 6 hours  levothyroxine Injectable 30 MICROGram(s) IV Push at bedtime  lipid, fat emulsion (Fish Oil and Plant Based) 20% Infusion 0.54 Gm/kG/Day (20.83 mL/Hr) IV Continuous <Continuous>  lipid, fat emulsion (Fish Oil and Plant Based) 20% Infusion 0.54 Gm/kG/Day (20.83 mL/Hr) IV Continuous <Continuous>  melatonin 5 milliGRAM(s) Oral at bedtime  metoprolol tartrate Injectable 10 milliGRAM(s) IV Push every 6 hours  Parenteral Nutrition - Adult 1 Each TPN Continuous <Continuous>  Parenteral Nutrition - Adult 1 Each TPN Continuous <Continuous>  timolol 0.5% Solution 1 Drop(s) Both EYES daily  ursodiol Suspension 300 milliGRAM(s) Oral every 8 hours  venlafaxine XR. 150 milliGRAM(s) Oral daily    MEDICATIONS  (PRN):  chlorhexidine 0.12% Liquid 15 milliLiter(s) Swish and Spit two times a day PRN oral hygeine  EPINEPHrine   1 mG/mL (1:1,000) Topical Solution 1 Application(s) Topical every 24 hours PRN for wound bleeding  LORazepam   Injectable 1 milliGRAM(s) IV Push <User Schedule> PRN Anxiety  ondansetron Injectable 4 milliGRAM(s) IV Push every 6 hours PRN Nausea and/or Vomiting    Allergies  penicillin (Angioedema)  Intoleranes    SOCIAL HISTORY:  Smoke: Never Smoker  EtOH: occasional    FAMILY HISTORY:    Vital Signs Last 24 Hrs  T(C): 36.1 (15 Sukhdev 2024 05:43), Max: 37.1 (14 Jan 2024 22:12)  T(F): 97 (15 Sukhdev 2024 05:43), Max: 98.8 (14 Jan 2024 22:12)  HR: 104 (15 Sukhdev 2024 07:00) (94 - 108)  BP: 161/79 (15 Sukhdev 2024 07:00) (127/58 - 161/79)  BP(mean): 112 (15 Sukhdev 2024 07:00) (84 - 112)  RR: 21 (15 Sukhdev 2024 07:00) (20 - 26)  SpO2: 100% (15 Sukhdev 2024 07:00) (95% - 100%)    Parameters below as of 15 Sukhdev 2024 05:45  Patient On (Oxygen Delivery Method): BiPAP/CPAP    O2 Concentration (%): 40    PHYSICAL EXAM    Physical Exam  General: Patient is doing well and lying in bed comfortably  Constitutional: alert and awake, A&O x 4  Pulm: no respiratory distress  CV: Regular rate and rhythm, in and out of afib/flutter  Abd: soft, nontender, nondistended. No rebound, no guarding. Wound manager in place, no bleeding, no leaks. soft semi-formed output, brown in color  Extremities: warm, well perfused    LABS:                        7.7    12.76 )-----------( 175      ( 15 Sukhdev 2024 06:44 )             23.9     01-15    137  |  103  |  35<H>  ----------------------------<  143<H>  4.2   |  27  |  1.29    Ca    7.8<L>      15 Sukhdev 2024 05:30  Phos  2.9     01-15  Mg     2.1     01-15    TPro  8.1  /  Alb  2.5<L>  /  TBili  5.4<H>  /  DBili  3.7<H>  /  AST  99<H>  /  ALT  67<H>  /  AlkPhos  101  01-15      Urinalysis Basic - ( 15 Sukhdev 2024 05:30 )    Color: x / Appearance: x / SG: x / pH: x  Gluc: 143 mg/dL / Ketone: x  / Bili: x / Urobili: x   Blood: x / Protein: x / Nitrite: x   Leuk Esterase: x / RBC: x / WBC x   Sq Epi: x / Non Sq Epi: x / Bacteria: x      RADIOLOGY & ADDITIONAL STUDIES:    Assessment and Plan:  78yMale

## 2024-01-15 NOTE — PROGRESS NOTE ADULT - SUBJECTIVE AND OBJECTIVE BOX
SUBJECTIVE: Pt seen and examined at bedside this am by surgery team. Tolerating diet, some bleeding on ileostomy noted over the weekend     ON: Hb 7.1     MEDICATIONS  (STANDING):  chlorhexidine 2% Cloths 1 Application(s) Topical <User Schedule>  cholestyramine Powder (Sugar-Free) 4 Gram(s) Oral daily  enoxaparin Injectable 90 milliGRAM(s) SubCutaneous every 12 hours  epoetin matt-epbx (RETACRIT) Injectable 14341 Unit(s) SubCutaneous every 7 days  ergocalciferol 78988 Unit(s) Oral <User Schedule>  gabapentin 100 milliGRAM(s) Oral at bedtime  glucagon  Injectable 1 milliGRAM(s) IntraMuscular once  hydrALAZINE Injectable 10 milliGRAM(s) IV Push every 6 hours  levothyroxine Injectable 30 MICROGram(s) IV Push at bedtime  lipid, fat emulsion (Fish Oil and Plant Based) 20% Infusion 0.54 Gm/kG/Day (20.83 mL/Hr) IV Continuous <Continuous>  lipid, fat emulsion (Fish Oil and Plant Based) 20% Infusion 0.54 Gm/kG/Day (20.83 mL/Hr) IV Continuous <Continuous>  melatonin 5 milliGRAM(s) Oral at bedtime  metoprolol tartrate Injectable 10 milliGRAM(s) IV Push every 6 hours  Parenteral Nutrition - Adult 1 Each TPN Continuous <Continuous>  Parenteral Nutrition - Adult 1 Each TPN Continuous <Continuous>  timolol 0.5% Solution 1 Drop(s) Both EYES daily  ursodiol Suspension 300 milliGRAM(s) Oral every 8 hours  venlafaxine XR. 150 milliGRAM(s) Oral daily    MEDICATIONS  (PRN):  chlorhexidine 0.12% Liquid 15 milliLiter(s) Swish and Spit two times a day PRN oral hygeine  EPINEPHrine   1 mG/mL (1:1,000) Topical Solution 1 Application(s) Topical every 24 hours PRN for wound bleeding  LORazepam   Injectable 1 milliGRAM(s) IV Push <User Schedule> PRN Anxiety  ondansetron Injectable 4 milliGRAM(s) IV Push every 6 hours PRN Nausea and/or Vomiting      Vital Signs Last 24 Hrs  T(C): 36.1 (15 Sukhdev 2024 05:43), Max: 37.1 (14 Jan 2024 22:12)  T(F): 97 (15 Sukhdev 2024 05:43), Max: 98.8 (14 Jan 2024 22:12)  HR: 106 (15 Sukhdev 2024 05:45) (94 - 108)  BP: 133/70 (15 Sukhdev 2024 05:45) (127/58 - 148/78)  BP(mean): 96 (15 Sukhdev 2024 05:45) (84 - 107)  RR: 23 (15 Sukhdev 2024 05:45) (20 - 26)  SpO2: 100% (15 Sukhdev 2024 05:45) (95% - 100%)    Parameters below as of 15 Sukhdev 2024 05:45  Patient On (Oxygen Delivery Method): BiPAP/CPAP    O2 Concentration (%): 40    Physical Exam  General: Patient is doing well and lying in bed comfortably  Constitutional: alert and oriented   Pulm: Nonlabored breathing, no respiratory distress  CV: Regular rate and rhythm, normal sinus rhythm  Abd: soft, nontender, nondistended. No rebound, no guarding. Wound manager in place without leaks, dark brown/green output. Perc cony capped Ileostomy with minimal output.   Extremities: warm, well perfused, no edema        I&O's Detail    14 Jan 2024 07:01  -  15 Sukhdev 2024 07:00  --------------------------------------------------------  IN:    Fat Emulsion (Fish Oil &amp; Plant Based) 20% Infusion: 228.8 mL    Oral Fluid: 1020 mL    TPN (Total Parenteral Nutrition): 4282 mL  Total IN: 5530.8 mL    OUT:    Drain (mL): 0 mL    Drain (mL): 1475 mL    Drain (mL): 125 mL    Voided (mL): 1550 mL  Total OUT: 3150 mL    Total NET: 2380.8 mL          LABS:                        7.1    12.68 )-----------( 164      ( 15 Sukhdev 2024 05:30 )             22.9     01-15    137  |  103  |  35<H>  ----------------------------<  143<H>  4.2   |  27  |  1.29    Ca    7.8<L>      15 Sukhdev 2024 05:30  Phos  2.9     01-15  Mg     2.1     01-15    TPro  8.1  /  Alb  2.5<L>  /  TBili  5.4<H>  /  DBili  3.7<H>  /  AST  99<H>  /  ALT  67<H>  /  AlkPhos  101  01-15      Urinalysis Basic - ( 15 Sukhdev 2024 05:30 )    Color: x / Appearance: x / SG: x / pH: x  Gluc: 143 mg/dL / Ketone: x  / Bili: x / Urobili: x   Blood: x / Protein: x / Nitrite: x   Leuk Esterase: x / RBC: x / WBC x   Sq Epi: x / Non Sq Epi: x / Bacteria: x       SUBJECTIVE: Pt seen and examined at bedside this am by surgery team. Tolerating diet, some bleeding on ileostomy noted over the weekend     ON: Hb 7.1     MEDICATIONS  (STANDING):  chlorhexidine 2% Cloths 1 Application(s) Topical <User Schedule>  cholestyramine Powder (Sugar-Free) 4 Gram(s) Oral daily  enoxaparin Injectable 90 milliGRAM(s) SubCutaneous every 12 hours  epoetin matt-epbx (RETACRIT) Injectable 97829 Unit(s) SubCutaneous every 7 days  ergocalciferol 81768 Unit(s) Oral <User Schedule>  gabapentin 100 milliGRAM(s) Oral at bedtime  glucagon  Injectable 1 milliGRAM(s) IntraMuscular once  hydrALAZINE Injectable 10 milliGRAM(s) IV Push every 6 hours  levothyroxine Injectable 30 MICROGram(s) IV Push at bedtime  lipid, fat emulsion (Fish Oil and Plant Based) 20% Infusion 0.54 Gm/kG/Day (20.83 mL/Hr) IV Continuous <Continuous>  lipid, fat emulsion (Fish Oil and Plant Based) 20% Infusion 0.54 Gm/kG/Day (20.83 mL/Hr) IV Continuous <Continuous>  melatonin 5 milliGRAM(s) Oral at bedtime  metoprolol tartrate Injectable 10 milliGRAM(s) IV Push every 6 hours  Parenteral Nutrition - Adult 1 Each TPN Continuous <Continuous>  Parenteral Nutrition - Adult 1 Each TPN Continuous <Continuous>  timolol 0.5% Solution 1 Drop(s) Both EYES daily  ursodiol Suspension 300 milliGRAM(s) Oral every 8 hours  venlafaxine XR. 150 milliGRAM(s) Oral daily    MEDICATIONS  (PRN):  chlorhexidine 0.12% Liquid 15 milliLiter(s) Swish and Spit two times a day PRN oral hygeine  EPINEPHrine   1 mG/mL (1:1,000) Topical Solution 1 Application(s) Topical every 24 hours PRN for wound bleeding  LORazepam   Injectable 1 milliGRAM(s) IV Push <User Schedule> PRN Anxiety  ondansetron Injectable 4 milliGRAM(s) IV Push every 6 hours PRN Nausea and/or Vomiting      Vital Signs Last 24 Hrs  T(C): 36.1 (15 Sukhdev 2024 05:43), Max: 37.1 (14 Jan 2024 22:12)  T(F): 97 (15 Sukhdev 2024 05:43), Max: 98.8 (14 Jan 2024 22:12)  HR: 106 (15 Sukhdev 2024 05:45) (94 - 108)  BP: 133/70 (15 Sukhdev 2024 05:45) (127/58 - 148/78)  BP(mean): 96 (15 Sukhdev 2024 05:45) (84 - 107)  RR: 23 (15 Sukhdev 2024 05:45) (20 - 26)  SpO2: 100% (15 Sukhdev 2024 05:45) (95% - 100%)    Parameters below as of 15 Sukhdev 2024 05:45  Patient On (Oxygen Delivery Method): BiPAP/CPAP    O2 Concentration (%): 40    Physical Exam  General: Patient is doing well and lying in bed comfortably  Constitutional: alert and oriented   Pulm: Nonlabored breathing, no respiratory distress  CV: Regular rate and rhythm, normal sinus rhythm  Abd: soft, nontender, nondistended. No rebound, no guarding. Wound manager in place without leaks, dark brown/green output. Perc cony capped Ileostomy with minimal output.   Extremities: warm, well perfused, no edema        I&O's Detail    14 Jan 2024 07:01  -  15 Sukhdev 2024 07:00  --------------------------------------------------------  IN:    Fat Emulsion (Fish Oil &amp; Plant Based) 20% Infusion: 228.8 mL    Oral Fluid: 1020 mL    TPN (Total Parenteral Nutrition): 4282 mL  Total IN: 5530.8 mL    OUT:    Drain (mL): 0 mL    Drain (mL): 1475 mL    Drain (mL): 125 mL    Voided (mL): 1550 mL  Total OUT: 3150 mL    Total NET: 2380.8 mL          LABS:                        7.1    12.68 )-----------( 164      ( 15 Sukhdev 2024 05:30 )             22.9     01-15    137  |  103  |  35<H>  ----------------------------<  143<H>  4.2   |  27  |  1.29    Ca    7.8<L>      15 Sukhdev 2024 05:30  Phos  2.9     01-15  Mg     2.1     01-15    TPro  8.1  /  Alb  2.5<L>  /  TBili  5.4<H>  /  DBili  3.7<H>  /  AST  99<H>  /  ALT  67<H>  /  AlkPhos  101  01-15      Urinalysis Basic - ( 15 Sukhdev 2024 05:30 )    Color: x / Appearance: x / SG: x / pH: x  Gluc: 143 mg/dL / Ketone: x  / Bili: x / Urobili: x   Blood: x / Protein: x / Nitrite: x   Leuk Esterase: x / RBC: x / WBC x   Sq Epi: x / Non Sq Epi: x / Bacteria: x       SUBJECTIVE: Pt seen and examined at bedside this am by surgery team. Tolerating diet, some bleeding on ileostomy noted over the weekend     ON: Hb 7.1     MEDICATIONS  (STANDING):  chlorhexidine 2% Cloths 1 Application(s) Topical <User Schedule>  cholestyramine Powder (Sugar-Free) 4 Gram(s) Oral daily  enoxaparin Injectable 90 milliGRAM(s) SubCutaneous every 12 hours  epoetin matt-epbx (RETACRIT) Injectable 32621 Unit(s) SubCutaneous every 7 days  ergocalciferol 57124 Unit(s) Oral <User Schedule>  gabapentin 100 milliGRAM(s) Oral at bedtime  glucagon  Injectable 1 milliGRAM(s) IntraMuscular once  hydrALAZINE Injectable 10 milliGRAM(s) IV Push every 6 hours  levothyroxine Injectable 30 MICROGram(s) IV Push at bedtime  lipid, fat emulsion (Fish Oil and Plant Based) 20% Infusion 0.54 Gm/kG/Day (20.83 mL/Hr) IV Continuous <Continuous>  lipid, fat emulsion (Fish Oil and Plant Based) 20% Infusion 0.54 Gm/kG/Day (20.83 mL/Hr) IV Continuous <Continuous>  melatonin 5 milliGRAM(s) Oral at bedtime  metoprolol tartrate Injectable 10 milliGRAM(s) IV Push every 6 hours  Parenteral Nutrition - Adult 1 Each TPN Continuous <Continuous>  Parenteral Nutrition - Adult 1 Each TPN Continuous <Continuous>  timolol 0.5% Solution 1 Drop(s) Both EYES daily  ursodiol Suspension 300 milliGRAM(s) Oral every 8 hours  venlafaxine XR. 150 milliGRAM(s) Oral daily    MEDICATIONS  (PRN):  chlorhexidine 0.12% Liquid 15 milliLiter(s) Swish and Spit two times a day PRN oral hygeine  EPINEPHrine   1 mG/mL (1:1,000) Topical Solution 1 Application(s) Topical every 24 hours PRN for wound bleeding  LORazepam   Injectable 1 milliGRAM(s) IV Push <User Schedule> PRN Anxiety  ondansetron Injectable 4 milliGRAM(s) IV Push every 6 hours PRN Nausea and/or Vomiting      Vital Signs Last 24 Hrs  T(C): 36.1 (15 Sukhdev 2024 05:43), Max: 37.1 (14 Jan 2024 22:12)  T(F): 97 (15 Sukhdev 2024 05:43), Max: 98.8 (14 Jan 2024 22:12)  HR: 106 (15 Sukhdev 2024 05:45) (94 - 108)  BP: 133/70 (15 Sukhdev 2024 05:45) (127/58 - 148/78)  BP(mean): 96 (15 Sukhdev 2024 05:45) (84 - 107)  RR: 23 (15 Sukhdev 2024 05:45) (20 - 26)  SpO2: 100% (15 Sukhdev 2024 05:45) (95% - 100%)    Parameters below as of 15 Sukhdev 2024 05:45  Patient On (Oxygen Delivery Method): BiPAP/CPAP    O2 Concentration (%): 40    Physical Exam  General: Patient is doing well and lying in bed comfortably  Constitutional: alert and oriented   Pulm: Nonlabored breathing, no respiratory distress  CV: Regular rate and rhythm, normal sinus rhythm  Abd: soft, nontender, nondistended. No rebound, no guarding. Wound manager in place without leaks, dark brown/green output. Perc cony capped Ileostomy with minimal output.   Extremities: warm, well perfused, no edema        I&O's Detail    14 Jan 2024 07:01  -  15 Sukhdev 2024 07:00  --------------------------------------------------------  IN:    Fat Emulsion (Fish Oil &amp; Plant Based) 20% Infusion: 228.8 mL    Oral Fluid: 1020 mL    TPN (Total Parenteral Nutrition): 4282 mL  Total IN: 5530.8 mL    OUT:    Drain (mL): 0 mL    Drain (mL): 1475 mL    Drain (mL): 125 mL    Voided (mL): 1550 mL  Total OUT: 3150 mL    Total NET: 2380.8 mL          LABS:                        7.1    12.68 )-----------( 164      ( 15 Sukhdev 2024 05:30 )             22.9     01-15    137  |  103  |  35<H>  ----------------------------<  143<H>  4.2   |  27  |  1.29    Ca    7.8<L>      15 Sukhdev 2024 05:30  Phos  2.9     01-15  Mg     2.1     01-15    TPro  8.1  /  Alb  2.5<L>  /  TBili  5.4<H>  /  DBili  3.7<H>  /  AST  99<H>  /  ALT  67<H>  /  AlkPhos  101  01-15      Urinalysis Basic - ( 15 Sukhdev 2024 05:30 )    Color: x / Appearance: x / SG: x / pH: x  Gluc: 143 mg/dL / Ketone: x  / Bili: x / Urobili: x   Blood: x / Protein: x / Nitrite: x   Leuk Esterase: x / RBC: x / WBC x   Sq Epi: x / Non Sq Epi: x / Bacteria: x

## 2024-01-15 NOTE — PROGRESS NOTE ADULT - ASSESSMENT
The patient is a 78 year old male with a PMHx of Crohn's, AFib/Flutter s/p DCCVs on amiodarone, remote ileocectomy and open appendectomy. Admitted (6/23) for SBO vs Crohns flare, s/p NGT decompression and s/p lap converted to open TRE, SBR x 3, left in discontinuity with abthera vac on (6/27), RTOR for ileocolic resection, small bowel anastomosis, and abdominal wall closure on (6/28), c/b fluid collection s/p IR aspiration of perihepatic fluid on (7/3), c/b wound dehiscence s/p RTOR exlap, washout, ileocolic resection with end ileostomy, blow hole colostomy, red rubber from ileostomy to small bowel anastomosis; vicryl bridging mesh on (7/5) transferred to SICU postoperatively for hemodynamic monitoring, with hospital course complicated by periods of severe ELVI and hyponatremia, which resolved but stepped back up for to SICU on (9/10) for acute AMS, intermittent hypoglycemia, AFib with RVR. Percutaneous Cholecystostomy placed on (9/11) for enlarged GB, PCT capped 10/5 and uncapped 10/25 for hyperbilirubinemia, Right brachial DVT, left basillic and cephalic superficial thrombus on duplex 11/2, CT 11/14 with ALDEN ground glass opacity of unclear etiology, completed empiric 7day cefepime course 11/22, rising T-bili of unclear etiology now w resolved candida glabrata fungemia, ELVI improving.     Reg Diet  Pain/nausea control prn  SQH/SCDs/OOBA/IS  BiPAP q12h prn  Wound manager changes daily  C/W octreotide, cholestyramine  C/W Epogen weekly  Tube study this week with possible removal of perc cony  Rest of care per SICU   The patient is a 78 year old male with a PMHx of Crohn's, AFib/Flutter s/p DCCVs on amiodarone, remote ileocectomy and open appendectomy. Admitted (6/23) for SBO vs Crohns flare, s/p NGT decompression and s/p lap converted to open TRE, SBR x 3, left in discontinuity with abthera vac on (6/27), RTOR for ileocolic resection, small bowel anastomosis, and abdominal wall closure on (6/28), c/b fluid collection s/p IR aspiration of perihepatic fluid on (7/3), c/b wound dehiscence s/p RTOR exlap, washout, ileocolic resection with end ileostomy, blow hole colostomy, red rubber from ileostomy to small bowel anastomosis; vicryl bridging mesh on (7/5) transferred to SICU postoperatively for hemodynamic monitoring, with hospital course complicated by periods of severe ELVI and hyponatremia, which resolved but stepped back up for to SICU on (9/10) for acute AMS, intermittent hypoglycemia, AFib with RVR. Percutaneous Cholecystostomy placed on (9/11) for enlarged GB, PCT capped 10/5 and uncapped 10/25 for hyperbilirubinemia, Right brachial DVT, left basillic and cephalic superficial thrombus on duplex 11/2, CT 11/14 with ALDEN ground glass opacity of unclear etiology, completed empiric 7day cefepime course 11/22, rising T-bili of unclear etiology now w resolved candida glabrata fungemia, ELVI improving.     Reg Diet  Pain/nausea control prn  BiPAP q12h prn  Wound manager changes daily  C/W octreotide, cholestyramine  C/W Epogen weekly  therapeutic SQL/SCDs/OOBA/IS  Tube study this week with possible removal of perc cony  Rest of care per SICU   The patient is a 78 year old male with a PMHx of Crohn's, AFib/Flutter s/p DCCVs on amiodarone, remote ileocectomy and open appendectomy. Admitted (6/23) for SBO vs Crohns flare, s/p NGT decompression and s/p lap converted to open TRE, SBR x 3, left in discontinuity with abthera vac on (6/27), RTOR for ileocolic resection, small bowel anastomosis, and abdominal wall closure on (6/28), c/b fluid collection s/p IR aspiration of perihepatic fluid on (7/3), c/b wound dehiscence s/p RTOR exlap, washout, ileocolic resection with end ileostomy, blow hole colostomy, red rubber from ileostomy to small bowel anastomosis; vicryl bridging mesh on (7/5) transferred to SICU postoperatively for hemodynamic monitoring, with hospital course complicated by periods of severe LEVI and hyponatremia, which resolved but stepped back up for to SICU on (9/10) for acute AMS, intermittent hypoglycemia, AFib with RVR. Percutaneous Cholecystostomy placed on (9/11) for enlarged GB, PCT capped 10/5 and uncapped 10/25 for hyperbilirubinemia, Right brachial DVT, left basillic and cephalic superficial thrombus on duplex 11/2, CT 11/14 with ALDEN ground glass opacity of unclear etiology, completed empiric 7day cefepime course 11/22, rising T-bili of unclear etiology now w resolved candida glabrata fungemia, ELVI improving.     Reg Diet  Pain/nausea control prn  BiPAP q12h prn  Wound manager changes daily  C/W octreotide, cholestyramine  C/W Epogen weekly  therapeutic SQL/SCDs/OOBA/IS  Tube study this week with possible removal of perc cony  Rest of care per SICU

## 2024-01-15 NOTE — PROGRESS NOTE ADULT - ASSESSMENT
IMPRESSION:   78 year old male w/ hx Crohn’s disease and prolonged hospitalization since 6/23, admitted for SBO s/p open TRE, SBR x 3, left in discontinuity with abthera, then RTOR for ileocolic resection and small bowel anastamosis with abdominal closure (6/28) c/b fluid collection/feculent peritonitis s/p IR aspiration (7/3) and then exlap, washout, ileocolic resection with end ileostomy, blow hole colostomy, red rubber from ileostomy to small bowel anastomosis, and vicryl bridging mesh (7/5) with abdominal cx at that time w/ VRE.casseliflavus, VRE.gallinarum E.faecium, Clostridium tertium, L.rhamnosus, C.albicans, then possible acalculous cholecystitis s/p perc cony (9/11) with negative bile fluid cx, then had CT with ALDEN GGOs for which received 7 days cefepime EOT 11/22, but then developed fevers up to 105 on 11/23, associated with hypotension, and rising leukocytosis to 12k, UA neg, BCx x2 with C.glabrata. RUE PICC line removed 11/24 (had been receiving TPN through this line).     He was been doing well since early December.  Now with rising WBC over a couple days.  No fever.  CXR with left basilar opacity.  Possible pneumonia?      Recommend:  1.  Can start Cefepime 2 grams IV q12 (CrCl 59)  2.  Monitor CBC with differential  3.  Follow up blood cultures    ID team 1 will follow

## 2024-01-15 NOTE — PROGRESS NOTE ADULT - SUBJECTIVE AND OBJECTIVE BOX
Seen with Dr Peterson  Bleeding from ostomy site  Hct decreased  WBCs increased Remains afeb VS stable Exam stable

## 2024-01-15 NOTE — PROGRESS NOTE ADULT - SUBJECTIVE AND OBJECTIVE BOX
INTERVAL HPI/OVERNIGHT EVENTS:    ID asked to reevaluate given new leukocytosis.  Patient has been afebrile.  He reports feeling SOB a couple days ago but notes this improved after receiving a transfusion. No SOB or cough. No change in stool output. No urinary symptoms     CONSTITUTIONAL:  Negative fever or chills, feels well, good appetite  EYES:  Negative  blurry vision or double vision  CARDIOVASCULAR:  Negative for chest pain or palpitations  RESPIRATORY:  Negative for cough, wheezing, or SOB   GASTROINTESTINAL:  Negative for nausea, vomiting, diarrhea, constipation, or abdominal pain  GENITOURINARY:  Negative frequency, urgency or dysuria  NEUROLOGIC:  No headache, confusion, dizziness, lightheadedness      ANTIBIOTICS/RELEVANT:    MEDICATIONS  (STANDING):  chlorhexidine 2% Cloths 1 Application(s) Topical <User Schedule>  cholestyramine Powder (Sugar-Free) 4 Gram(s) Oral daily  epoetin matt-epbx (RETACRIT) Injectable 33297 Unit(s) SubCutaneous every 7 days  ergocalciferol 87941 Unit(s) Oral <User Schedule>  gabapentin 100 milliGRAM(s) Oral at bedtime  glucagon  Injectable 1 milliGRAM(s) IntraMuscular once  hydrALAZINE Injectable 10 milliGRAM(s) IV Push every 6 hours  levothyroxine Injectable 30 MICROGram(s) IV Push at bedtime  lipid, fat emulsion (Fish Oil and Plant Based) 20% Infusion 0.54 Gm/kG/Day (20.83 mL/Hr) IV Continuous <Continuous>  melatonin 5 milliGRAM(s) Oral at bedtime  metoprolol tartrate Injectable 10 milliGRAM(s) IV Push every 6 hours  Parenteral Nutrition - Adult 1 Each TPN Continuous <Continuous>  Parenteral Nutrition - Adult 1 Each TPN Continuous <Continuous>  timolol 0.5% Solution 1 Drop(s) Both EYES daily  ursodiol Suspension 300 milliGRAM(s) Oral every 8 hours  venlafaxine XR. 150 milliGRAM(s) Oral daily    MEDICATIONS  (PRN):  chlorhexidine 0.12% Liquid 15 milliLiter(s) Swish and Spit two times a day PRN oral hygeine  EPINEPHrine   1 mG/mL (1:1,000) Topical Solution 1 Application(s) Topical every 24 hours PRN for wound bleeding  LORazepam   Injectable 1 milliGRAM(s) IV Push <User Schedule> PRN Anxiety  ondansetron Injectable 4 milliGRAM(s) IV Push every 6 hours PRN Nausea and/or Vomiting        Vital Signs Last 24 Hrs  T(C): 36.7 (15 Sukhdev 2024 13:50), Max: 37.1 (2024 22:12)  T(F): 98.1 (15 Sukhdev 2024 13:50), Max: 98.8 (2024 22:12)  HR: 98 (15 Sukhdev 2024 13:50) (95 - 107)  BP: 151/74 (15 Sukhdev 2024 13:50) (110/57 - 161/79)  BP(mean): 106 (15 Sukhdev 2024 13:50) (78 - 112)  RR: 22 (15 Sukhdev 2024 13:50) (15 - 26)  SpO2: 100% (15 Sukhdev 2024 13:50) (95% - 100%)    Parameters below as of 15 Sukhdev 2024 13:50  Patient On (Oxygen Delivery Method): room air        PHYSICAL EXAM:  Constitutional: non-toxic, no distress  Eyes:PATRICIA, EOMI  Ear/Nose/Throat: no oral lesion, no sinus tenderness on percussion	  Neck:  supple  Respiratory: CTA jesus  Cardiovascular: S1S2 RRR, no murmurs  Gastrointestinal:soft, (+) BS, no HSM  Extremities:no e/e/c  Vascular: DP Pulse:	right normal; left normal      LABS:                        7.7    12.76 )-----------( 175      ( 15 Sukhdev 2024 06:44 )             23.9     -15    137  |  103  |  35<H>  ----------------------------<  143<H>  4.2   |  27  |  1.29    Ca    7.8<L>      15 Sukhdev 2024 05:30  Phos  2.9     -15  Mg     2.1     -15    TPro  8.1  /  Alb  2.5<L>  /  TBili  5.4<H>  /  DBili  3.7<H>  /  AST  99<H>  /  ALT  67<H>  /  AlkPhos  101  -15      Urinalysis Basic - ( 15 Sukhdev 2024 15:20 )    Color: Dark Yellow / Appearance: Clear / S.017 / pH: x  Gluc: x / Ketone: Negative mg/dL  / Bili: Moderate / Urobili: 0.2 mg/dL   Blood: x / Protein: 30 mg/dL / Nitrite: Negative   Leuk Esterase: Trace / RBC: x / WBC x   Sq Epi: x / Non Sq Epi: x / Bacteria: x        MICROBIOLOGY:    Culture - Body Fluid with Gram Stain (23 @ 20:43)    -  Meropenem: S 2   -  Meropenem: R   -  Piperacillin/Tazobactam: S <=8   -  Piperacillin/Tazobactam: S <=8   -  Piperacillin/Tazobactam: S <=8   -  Tobramycin: S <=2   -  Tobramycin: S   -  Tobramycin: S   -  Trimethoprim/Sulfamethoxazole: S <=0.5/9.5   -  Ampicillin: R >16 These ampicillin results predict results for amoxicillin   -  Ampicillin/Sulbactam: R >16/8   -  Aztreonam: S <=4   -  Aztreonam: S <=4   -  Cefazolin: R >16   -  Cefepime: S 8   -  Cefepime: S <=2   -  Ceftriaxone: S <=1   -  Ciprofloxacin: S <=0.25   -  Ciprofloxacin: R 2   -  Ciprofloxacin: S 0.5   -  Ertapenem: S <=0.5   -  Gentamicin: S <=2   -  Levofloxacin: R 4   -  Levofloxacin: S <=0.5   Gram Stain:   Rare Gram Negative Rods  No WBC's seen.   Specimen Source: Bile Bile   Culture Results:   Few Pseudomonas aeruginosa (Carbapenem Resistant) Floor previously  notified.  Few Pseudomonas aeruginosa #2  Few Escherichia coli  Rare Bacteroides fragilis Beta lactamase positive   Organism Identification: Pseudomonas aeruginosa (Carbapenem Resistant)  Pseudomonas aeruginosa (Carbapenem Resistant)  Escherichia coli  Pseudomonas aeruginosa  Pseudomonas aeruginosa   Organism: Pseudomonas aeruginosa (Carbapenem Resistant)   Organism: Pseudomonas aeruginosa (Carbapenem Resistant)   Organism: Escherichia coli   Organism: Pseudomonas aeruginosa   Organism: Pseudomonas aeruginosa   Method Type: KB   Method Type: DOMENICO   Method Type: DOMENICO   Method Type: KB   Method Type: DOMENICO        RADIOLOGY & ADDITIONAL STUDIES:    < from: Xray Chest 1 View-PORTABLE IMMEDIATE (Xray Chest 1 View-PORTABLE IMMEDIATE .) (01.15.24 @ 09:12) >  IMPRESSION:  Left basilar opacity, likely atelectasis versus pneumonia.    < end of copied text >   INTERVAL HPI/OVERNIGHT EVENTS:    ID asked to reevaluate given new leukocytosis.  Patient has been afebrile.  He reports feeling SOB a couple days ago but notes this improved after receiving a transfusion. No SOB or cough. No change in stool output. No urinary symptoms     CONSTITUTIONAL:  Negative fever or chills, feels well, good appetite  EYES:  Negative  blurry vision or double vision  CARDIOVASCULAR:  Negative for chest pain or palpitations  RESPIRATORY:  Negative for cough, wheezing, or SOB   GASTROINTESTINAL:  Negative for nausea, vomiting, diarrhea, constipation, or abdominal pain  GENITOURINARY:  Negative frequency, urgency or dysuria  NEUROLOGIC:  No headache, confusion, dizziness, lightheadedness      ANTIBIOTICS/RELEVANT:    MEDICATIONS  (STANDING):  chlorhexidine 2% Cloths 1 Application(s) Topical <User Schedule>  cholestyramine Powder (Sugar-Free) 4 Gram(s) Oral daily  epoetin matt-epbx (RETACRIT) Injectable 36125 Unit(s) SubCutaneous every 7 days  ergocalciferol 29450 Unit(s) Oral <User Schedule>  gabapentin 100 milliGRAM(s) Oral at bedtime  glucagon  Injectable 1 milliGRAM(s) IntraMuscular once  hydrALAZINE Injectable 10 milliGRAM(s) IV Push every 6 hours  levothyroxine Injectable 30 MICROGram(s) IV Push at bedtime  lipid, fat emulsion (Fish Oil and Plant Based) 20% Infusion 0.54 Gm/kG/Day (20.83 mL/Hr) IV Continuous <Continuous>  melatonin 5 milliGRAM(s) Oral at bedtime  metoprolol tartrate Injectable 10 milliGRAM(s) IV Push every 6 hours  Parenteral Nutrition - Adult 1 Each TPN Continuous <Continuous>  Parenteral Nutrition - Adult 1 Each TPN Continuous <Continuous>  timolol 0.5% Solution 1 Drop(s) Both EYES daily  ursodiol Suspension 300 milliGRAM(s) Oral every 8 hours  venlafaxine XR. 150 milliGRAM(s) Oral daily    MEDICATIONS  (PRN):  chlorhexidine 0.12% Liquid 15 milliLiter(s) Swish and Spit two times a day PRN oral hygeine  EPINEPHrine   1 mG/mL (1:1,000) Topical Solution 1 Application(s) Topical every 24 hours PRN for wound bleeding  LORazepam   Injectable 1 milliGRAM(s) IV Push <User Schedule> PRN Anxiety  ondansetron Injectable 4 milliGRAM(s) IV Push every 6 hours PRN Nausea and/or Vomiting        Vital Signs Last 24 Hrs  T(C): 36.7 (15 Sukhdev 2024 13:50), Max: 37.1 (2024 22:12)  T(F): 98.1 (15 Sukhdev 2024 13:50), Max: 98.8 (2024 22:12)  HR: 98 (15 Sukhdev 2024 13:50) (95 - 107)  BP: 151/74 (15 Sukhdev 2024 13:50) (110/57 - 161/79)  BP(mean): 106 (15 Sukhdev 2024 13:50) (78 - 112)  RR: 22 (15 Sukhdev 2024 13:50) (15 - 26)  SpO2: 100% (15 Sukhdev 2024 13:50) (95% - 100%)    Parameters below as of 15 Sukhdev 2024 13:50  Patient On (Oxygen Delivery Method): room air        PHYSICAL EXAM:  Constitutional: non-toxic, no distress  Eyes:PATRICIA, EOMI  Ear/Nose/Throat: no oral lesion, no sinus tenderness on percussion	  Neck:  supple  Respiratory: CTA jesus  Cardiovascular: S1S2 RRR, no murmurs  Gastrointestinal:soft, (+) BS, no HSM  Extremities:no e/e/c  Vascular: DP Pulse:	right normal; left normal      LABS:                        7.7    12.76 )-----------( 175      ( 15 Sukhdev 2024 06:44 )             23.9     -15    137  |  103  |  35<H>  ----------------------------<  143<H>  4.2   |  27  |  1.29    Ca    7.8<L>      15 Sukhdev 2024 05:30  Phos  2.9     -15  Mg     2.1     -15    TPro  8.1  /  Alb  2.5<L>  /  TBili  5.4<H>  /  DBili  3.7<H>  /  AST  99<H>  /  ALT  67<H>  /  AlkPhos  101  -15      Urinalysis Basic - ( 15 Sukhdev 2024 15:20 )    Color: Dark Yellow / Appearance: Clear / S.017 / pH: x  Gluc: x / Ketone: Negative mg/dL  / Bili: Moderate / Urobili: 0.2 mg/dL   Blood: x / Protein: 30 mg/dL / Nitrite: Negative   Leuk Esterase: Trace / RBC: x / WBC x   Sq Epi: x / Non Sq Epi: x / Bacteria: x        MICROBIOLOGY:    Culture - Body Fluid with Gram Stain (23 @ 20:43)    -  Meropenem: S 2   -  Meropenem: R   -  Piperacillin/Tazobactam: S <=8   -  Piperacillin/Tazobactam: S <=8   -  Piperacillin/Tazobactam: S <=8   -  Tobramycin: S <=2   -  Tobramycin: S   -  Tobramycin: S   -  Trimethoprim/Sulfamethoxazole: S <=0.5/9.5   -  Ampicillin: R >16 These ampicillin results predict results for amoxicillin   -  Ampicillin/Sulbactam: R >16/8   -  Aztreonam: S <=4   -  Aztreonam: S <=4   -  Cefazolin: R >16   -  Cefepime: S 8   -  Cefepime: S <=2   -  Ceftriaxone: S <=1   -  Ciprofloxacin: S <=0.25   -  Ciprofloxacin: R 2   -  Ciprofloxacin: S 0.5   -  Ertapenem: S <=0.5   -  Gentamicin: S <=2   -  Levofloxacin: R 4   -  Levofloxacin: S <=0.5   Gram Stain:   Rare Gram Negative Rods  No WBC's seen.   Specimen Source: Bile Bile   Culture Results:   Few Pseudomonas aeruginosa (Carbapenem Resistant) Floor previously  notified.  Few Pseudomonas aeruginosa #2  Few Escherichia coli  Rare Bacteroides fragilis Beta lactamase positive   Organism Identification: Pseudomonas aeruginosa (Carbapenem Resistant)  Pseudomonas aeruginosa (Carbapenem Resistant)  Escherichia coli  Pseudomonas aeruginosa  Pseudomonas aeruginosa   Organism: Pseudomonas aeruginosa (Carbapenem Resistant)   Organism: Pseudomonas aeruginosa (Carbapenem Resistant)   Organism: Escherichia coli   Organism: Pseudomonas aeruginosa   Organism: Pseudomonas aeruginosa   Method Type: KB   Method Type: DOMENICO   Method Type: DOMENICO   Method Type: KB   Method Type: DOMENICO        RADIOLOGY & ADDITIONAL STUDIES:    < from: Xray Chest 1 View-PORTABLE IMMEDIATE (Xray Chest 1 View-PORTABLE IMMEDIATE .) (01.15.24 @ 09:12) >  IMPRESSION:  Left basilar opacity, likely atelectasis versus pneumonia.    < end of copied text >   INTERVAL HPI/OVERNIGHT EVENTS:    ID asked to reevaluate given new leukocytosis.  Patient has been afebrile.  He reports feeling SOB a couple days ago but notes this improved after receiving a transfusion. No SOB or cough. No change in stool output. No urinary symptoms     CONSTITUTIONAL:  Negative fever or chills, feels well, good appetite  EYES:  Negative  blurry vision or double vision  CARDIOVASCULAR:  Negative for chest pain or palpitations  RESPIRATORY:  Negative for cough, wheezing, or SOB   GASTROINTESTINAL:  Negative for nausea, vomiting, diarrhea, constipation, or abdominal pain  GENITOURINARY:  Negative frequency, urgency or dysuria  NEUROLOGIC:  No headache, confusion, dizziness, lightheadedness      ANTIBIOTICS/RELEVANT:    MEDICATIONS  (STANDING):  chlorhexidine 2% Cloths 1 Application(s) Topical <User Schedule>  cholestyramine Powder (Sugar-Free) 4 Gram(s) Oral daily  epoetin matt-epbx (RETACRIT) Injectable 22613 Unit(s) SubCutaneous every 7 days  ergocalciferol 02740 Unit(s) Oral <User Schedule>  gabapentin 100 milliGRAM(s) Oral at bedtime  glucagon  Injectable 1 milliGRAM(s) IntraMuscular once  hydrALAZINE Injectable 10 milliGRAM(s) IV Push every 6 hours  levothyroxine Injectable 30 MICROGram(s) IV Push at bedtime  lipid, fat emulsion (Fish Oil and Plant Based) 20% Infusion 0.54 Gm/kG/Day (20.83 mL/Hr) IV Continuous <Continuous>  melatonin 5 milliGRAM(s) Oral at bedtime  metoprolol tartrate Injectable 10 milliGRAM(s) IV Push every 6 hours  Parenteral Nutrition - Adult 1 Each TPN Continuous <Continuous>  Parenteral Nutrition - Adult 1 Each TPN Continuous <Continuous>  timolol 0.5% Solution 1 Drop(s) Both EYES daily  ursodiol Suspension 300 milliGRAM(s) Oral every 8 hours  venlafaxine XR. 150 milliGRAM(s) Oral daily    MEDICATIONS  (PRN):  chlorhexidine 0.12% Liquid 15 milliLiter(s) Swish and Spit two times a day PRN oral hygeine  EPINEPHrine   1 mG/mL (1:1,000) Topical Solution 1 Application(s) Topical every 24 hours PRN for wound bleeding  LORazepam   Injectable 1 milliGRAM(s) IV Push <User Schedule> PRN Anxiety  ondansetron Injectable 4 milliGRAM(s) IV Push every 6 hours PRN Nausea and/or Vomiting        Vital Signs Last 24 Hrs  T(C): 36.7 (15 Sukhdev 2024 13:50), Max: 37.1 (2024 22:12)  T(F): 98.1 (15 Sukhdev 2024 13:50), Max: 98.8 (2024 22:12)  HR: 98 (15 Sukhdev 2024 13:50) (95 - 107)  BP: 151/74 (15 Sukhdev 2024 13:50) (110/57 - 161/79)  BP(mean): 106 (15 Sukhdev 2024 13:50) (78 - 112)  RR: 22 (15 Sukhdev 2024 13:50) (15 - 26)  SpO2: 100% (15 Sukhdev 2024 13:50) (95% - 100%)    Parameters below as of 15 Sukhdev 2024 13:50  Patient On (Oxygen Delivery Method): room air        PHYSICAL EXAM:  Constitutional: non-toxic, no distress  Eyes:PATRICIA, EOMI  Ear/Nose/Throat: no oral lesion, no sinus tenderness on percussion	  Neck:  supple  Respiratory: CTA jesus  Cardiovascular: S1S2 RRR, no murmurs  Gastrointestinal:soft, (+) BS, no HSM  Extremities:no e/e/c  Vascular: DP Pulse:	right normal; left normal      LABS:                        7.7    12.76 )-----------( 175      ( 15 Sukhdev 2024 06:44 )             23.9     -15    137  |  103  |  35<H>  ----------------------------<  143<H>  4.2   |  27  |  1.29    Ca    7.8<L>      15 Sukhdev 2024 05:30  Phos  2.9     -15  Mg     2.1     -15    TPro  8.1  /  Alb  2.5<L>  /  TBili  5.4<H>  /  DBili  3.7<H>  /  AST  99<H>  /  ALT  67<H>  /  AlkPhos  101  -15      Urinalysis Basic - ( 15 Sukhdev 2024 15:20 )    Color: Dark Yellow / Appearance: Clear / S.017 / pH: x  Gluc: x / Ketone: Negative mg/dL  / Bili: Moderate / Urobili: 0.2 mg/dL   Blood: x / Protein: 30 mg/dL / Nitrite: Negative   Leuk Esterase: Trace / RBC: x / WBC x   Sq Epi: x / Non Sq Epi: x / Bacteria: x        MICROBIOLOGY:    Culture - Body Fluid with Gram Stain (23 @ 20:43)    -  Meropenem: S 2   -  Meropenem: R   -  Piperacillin/Tazobactam: S <=8   -  Piperacillin/Tazobactam: S <=8   -  Piperacillin/Tazobactam: S <=8   -  Tobramycin: S <=2   -  Tobramycin: S   -  Tobramycin: S   -  Trimethoprim/Sulfamethoxazole: S <=0.5/9.5   -  Ampicillin: R >16 These ampicillin results predict results for amoxicillin   -  Ampicillin/Sulbactam: R >16/8   -  Aztreonam: S <=4   -  Aztreonam: S <=4   -  Cefazolin: R >16   -  Cefepime: S 8   -  Cefepime: S <=2   -  Ceftriaxone: S <=1   -  Ciprofloxacin: S <=0.25   -  Ciprofloxacin: R 2   -  Ciprofloxacin: S 0.5   -  Ertapenem: S <=0.5   -  Gentamicin: S <=2   -  Levofloxacin: R 4   -  Levofloxacin: S <=0.5   Gram Stain:   Rare Gram Negative Rods  No WBC's seen.   Specimen Source: Bile Bile   Culture Results:   Few Pseudomonas aeruginosa (Carbapenem Resistant) Floor previously  notified.  Few Pseudomonas aeruginosa #2  Few Escherichia coli  Rare Bacteroides fragilis Beta lactamase positive   Organism Identification: Pseudomonas aeruginosa (Carbapenem Resistant)  Pseudomonas aeruginosa (Carbapenem Resistant)  Escherichia coli  Pseudomonas aeruginosa  Pseudomonas aeruginosa   Organism: Pseudomonas aeruginosa (Carbapenem Resistant)   Organism: Pseudomonas aeruginosa (Carbapenem Resistant)   Organism: Escherichia coli   Organism: Pseudomonas aeruginosa   Organism: Pseudomonas aeruginosa   Method Type: KB   Method Type: DOMENICO   Method Type: DOMENICO   Method Type: KB   Method Type: DOMENICO        RADIOLOGY & ADDITIONAL STUDIES:    < from: Xray Chest 1 View-PORTABLE IMMEDIATE (Xray Chest 1 View-PORTABLE IMMEDIATE .) (01.15.24 @ 09:12) >  IMPRESSION:  Left basilar opacity, likely atelectasis versus pneumonia.    < end of copied text >

## 2024-01-16 NOTE — PROGRESS NOTE ADULT - SUBJECTIVE AND OBJECTIVE BOX
SUBJECTIVE: Pt seen and examined at bedside this am by surgery team. Tolerating diet, reports feeling better after 1 PRBC.     ON: Perchole tube to gravity, recieved 1 PRBC Hb 9.9, started on cefepime on 1/15    MEDICATIONS  (STANDING):  cefepime  Injectable. 2000 milliGRAM(s) IV Push every 12 hours  chlorhexidine 2% Cloths 1 Application(s) Topical <User Schedule>  cholestyramine Powder (Sugar-Free) 4 Gram(s) Oral daily  epoetin matt-epbx (RETACRIT) Injectable 77665 Unit(s) SubCutaneous every 7 days  ergocalciferol 12994 Unit(s) Oral <User Schedule>  gabapentin 100 milliGRAM(s) Oral at bedtime  glucagon  Injectable 1 milliGRAM(s) IntraMuscular once  hydrALAZINE Injectable 10 milliGRAM(s) IV Push every 6 hours  levothyroxine Injectable 30 MICROGram(s) IV Push at bedtime  lipid, fat emulsion (Fish Oil and Plant Based) 20% Infusion 0.54 Gm/kG/Day (20.83 mL/Hr) IV Continuous <Continuous>  lipid, fat emulsion (Fish Oil and Plant Based) 20% Infusion 0.54 Gm/kG/Day (20.83 mL/Hr) IV Continuous <Continuous>  melatonin 5 milliGRAM(s) Oral at bedtime  metoprolol tartrate Injectable 10 milliGRAM(s) IV Push every 6 hours  Parenteral Nutrition - Adult 1 Each TPN Continuous <Continuous>  Parenteral Nutrition - Adult 1 Each TPN Continuous <Continuous>  timolol 0.5% Solution 1 Drop(s) Both EYES daily  ursodiol Suspension 300 milliGRAM(s) Oral every 8 hours  venlafaxine XR. 150 milliGRAM(s) Oral daily    MEDICATIONS  (PRN):  chlorhexidine 0.12% Liquid 15 milliLiter(s) Swish and Spit two times a day PRN oral hygeine  EPINEPHrine   1 mG/mL (1:1,000) Topical Solution 1 Application(s) Topical every 24 hours PRN for wound bleeding  LORazepam   Injectable 1 milliGRAM(s) IV Push <User Schedule> PRN Anxiety  ondansetron Injectable 4 milliGRAM(s) IV Push every 6 hours PRN Nausea and/or Vomiting      Vital Signs Last 24 Hrs  T(C): 36.8 (16 Jan 2024 05:16), Max: 36.8 (16 Jan 2024 05:16)  T(F): 98.2 (16 Jan 2024 05:16), Max: 98.2 (16 Jan 2024 05:16)  HR: 105 (16 Jan 2024 07:00) (94 - 106)  BP: 139/65 (16 Jan 2024 05:53) (110/57 - 151/74)  BP(mean): 94 (16 Jan 2024 05:53) (78 - 107)  RR: 30 (16 Jan 2024 07:00) (15 - 31)  SpO2: 98% (16 Jan 2024 07:00) (96% - 100%)    Parameters below as of 16 Jan 2024 05:53  Patient On (Oxygen Delivery Method): room air      Physical Exam  General: Patient is doing well and lying in bed comfortably  Constitutional: alert and oriented   Pulm: Nonlabored breathing, no respiratory distress  CV: Regular rate and rhythm, normal sinus rhythm  Abd: soft, nontender, nondistended. No rebound, no guarding. Wound manager in place without leaks, dark brown/green output. Perc cony capped Ileostomy with minimal output.   Extremities: warm, well perfused, no edema        I&O's Detail    15 Sukhdev 2024 07:01  -  16 Jan 2024 07:00  --------------------------------------------------------  IN:    Fat Emulsion (Fish Oil &amp; Plant Based) 20% Infusion: 208 mL    Oral Fluid: 260 mL    TPN (Total Parenteral Nutrition): 2808 mL  Total IN: 3276 mL    OUT:    Drain (mL): 1375 mL    Drain (mL): 60 mL    Drain (mL): 1025 mL    Voided (mL): 1350 mL  Total OUT: 3810 mL    Total NET: -534 mL          LABS:                        8.5    12.47 )-----------( 168      ( 16 Jan 2024 05:30 )             26.7     01-16    137  |  105  |  37<H>  ----------------------------<  140<H>  4.7   |  24  |  1.30    Ca    8.1<L>      16 Jan 2024 05:30  Phos  3.2     01-16  Mg     2.1     01-16    TPro  7.8  /  Alb  2.4<L>  /  TBili  5.7<H>  /  DBili  3.6<H>  /  AST  108<H>  /  ALT  71<H>  /  AlkPhos  105  01-16      Urinalysis Basic - ( 16 Jan 2024 05:30 )    Color: x / Appearance: x / SG: x / pH: x  Gluc: 140 mg/dL / Ketone: x  / Bili: x / Urobili: x   Blood: x / Protein: x / Nitrite: x   Leuk Esterase: x / RBC: x / WBC x   Sq Epi: x / Non Sq Epi: x / Bacteria: x        RADIOLOGY & ADDITIONAL STUDIES:    SUBJECTIVE: Pt seen and examined at bedside this am by surgery team. Tolerating diet, reports feeling better after 1 PRBC.     ON: Perchole tube to gravity, recieved 1 PRBC Hb 9.9, started on cefepime on 1/15    MEDICATIONS  (STANDING):  cefepime  Injectable. 2000 milliGRAM(s) IV Push every 12 hours  chlorhexidine 2% Cloths 1 Application(s) Topical <User Schedule>  cholestyramine Powder (Sugar-Free) 4 Gram(s) Oral daily  epoetin matt-epbx (RETACRIT) Injectable 71160 Unit(s) SubCutaneous every 7 days  ergocalciferol 81877 Unit(s) Oral <User Schedule>  gabapentin 100 milliGRAM(s) Oral at bedtime  glucagon  Injectable 1 milliGRAM(s) IntraMuscular once  hydrALAZINE Injectable 10 milliGRAM(s) IV Push every 6 hours  levothyroxine Injectable 30 MICROGram(s) IV Push at bedtime  lipid, fat emulsion (Fish Oil and Plant Based) 20% Infusion 0.54 Gm/kG/Day (20.83 mL/Hr) IV Continuous <Continuous>  lipid, fat emulsion (Fish Oil and Plant Based) 20% Infusion 0.54 Gm/kG/Day (20.83 mL/Hr) IV Continuous <Continuous>  melatonin 5 milliGRAM(s) Oral at bedtime  metoprolol tartrate Injectable 10 milliGRAM(s) IV Push every 6 hours  Parenteral Nutrition - Adult 1 Each TPN Continuous <Continuous>  Parenteral Nutrition - Adult 1 Each TPN Continuous <Continuous>  timolol 0.5% Solution 1 Drop(s) Both EYES daily  ursodiol Suspension 300 milliGRAM(s) Oral every 8 hours  venlafaxine XR. 150 milliGRAM(s) Oral daily    MEDICATIONS  (PRN):  chlorhexidine 0.12% Liquid 15 milliLiter(s) Swish and Spit two times a day PRN oral hygeine  EPINEPHrine   1 mG/mL (1:1,000) Topical Solution 1 Application(s) Topical every 24 hours PRN for wound bleeding  LORazepam   Injectable 1 milliGRAM(s) IV Push <User Schedule> PRN Anxiety  ondansetron Injectable 4 milliGRAM(s) IV Push every 6 hours PRN Nausea and/or Vomiting      Vital Signs Last 24 Hrs  T(C): 36.8 (16 Jan 2024 05:16), Max: 36.8 (16 Jan 2024 05:16)  T(F): 98.2 (16 Jan 2024 05:16), Max: 98.2 (16 Jan 2024 05:16)  HR: 105 (16 Jan 2024 07:00) (94 - 106)  BP: 139/65 (16 Jan 2024 05:53) (110/57 - 151/74)  BP(mean): 94 (16 Jan 2024 05:53) (78 - 107)  RR: 30 (16 Jan 2024 07:00) (15 - 31)  SpO2: 98% (16 Jan 2024 07:00) (96% - 100%)    Parameters below as of 16 Jan 2024 05:53  Patient On (Oxygen Delivery Method): room air      Physical Exam  General: Patient is doing well and lying in bed comfortably  Constitutional: alert and oriented   Pulm: Nonlabored breathing, no respiratory distress  CV: Regular rate and rhythm, normal sinus rhythm  Abd: soft, nontender, nondistended. No rebound, no guarding. Wound manager in place without leaks, dark brown/green output. Perc cony capped Ileostomy with minimal output.   Extremities: warm, well perfused, no edema        I&O's Detail    15 Sukhdev 2024 07:01  -  16 Jan 2024 07:00  --------------------------------------------------------  IN:    Fat Emulsion (Fish Oil &amp; Plant Based) 20% Infusion: 208 mL    Oral Fluid: 260 mL    TPN (Total Parenteral Nutrition): 2808 mL  Total IN: 3276 mL    OUT:    Drain (mL): 1375 mL    Drain (mL): 60 mL    Drain (mL): 1025 mL    Voided (mL): 1350 mL  Total OUT: 3810 mL    Total NET: -534 mL          LABS:                        8.5    12.47 )-----------( 168      ( 16 Jan 2024 05:30 )             26.7     01-16    137  |  105  |  37<H>  ----------------------------<  140<H>  4.7   |  24  |  1.30    Ca    8.1<L>      16 Jan 2024 05:30  Phos  3.2     01-16  Mg     2.1     01-16    TPro  7.8  /  Alb  2.4<L>  /  TBili  5.7<H>  /  DBili  3.6<H>  /  AST  108<H>  /  ALT  71<H>  /  AlkPhos  105  01-16      Urinalysis Basic - ( 16 Jan 2024 05:30 )    Color: x / Appearance: x / SG: x / pH: x  Gluc: 140 mg/dL / Ketone: x  / Bili: x / Urobili: x   Blood: x / Protein: x / Nitrite: x   Leuk Esterase: x / RBC: x / WBC x   Sq Epi: x / Non Sq Epi: x / Bacteria: x        RADIOLOGY & ADDITIONAL STUDIES:

## 2024-01-16 NOTE — PROGRESS NOTE ADULT - ATTENDING COMMENTS
Agree with above.  WBC decreasing today.  No other focal complaints.  No SOB or cough.  No sputum production to narrow antibiotics.  WBC improved on Cefepime.  Recommend completing 7 day course of cefepime (ends 1/22/24)    ID team 1 will sign off.  Reconsult as needed

## 2024-01-16 NOTE — PROGRESS NOTE ADULT - ASSESSMENT
A/p: 78M w PMH Crohn's, AFib/Flutter s/p DCCVs on amiodarone, remote ileocectomy and open appendectomy. Admitted (6/23) for SBO vs Crohns flare, s/p NGT decompression and s/p lap converted to open TRE, SBR x 3, left in discontinuity with abthera vac on (6/27), RTOR for ileocolic resection, small bowel anastomosis, and abdominal wall closure on (6/28), c/b fluid collection s/p IR aspiration of perihepatic fluid on (7/3), c/b wound dehiscence s/p RTOR exlap, washout, ileocolic resection with end ileostomy, blow hole colostomy, red rubber from ileostomy to small bowel anastomosis; vicryl bridging mesh on (7/5) transferred to SICU postoperatively for hemodynamic monitoring, with hospital course complicated by periods of severe ELVI and hyponatremia, which resolved but stepped back up for to SICU on (9/10) for acute AMS, intermittent hypoglycemia, AFib with RVR. Percutaneous Cholecystostomy placed on (9/11) for enlarged GB, PCT capped 10/5 and uncapped 10/25 for hyperbilirubinemia, Right brachial DVT, left basillic and cephalic superficial thrombus on duplex 11/2, CT 11/14 with ALDEN ground glass opacity of unclear etiology, completed empiric 7day cefepime course 11/22, rising T-bili of unclear etilogy, now with resolved candida glabrata fungemia, ELVI improving.     NEURO: Hx of depression: cont Effexor (halved dose in setting of renal dysfunx). Anxiety: Lorazepam PRN. Holistic consult for anxiety and insomnia. Gabapentin for restless leg syndrome.   HEENT: Timolol eye gtt added on 1/10 for additional systemic BB support in setting of malabsorption  CV: Hx of Afib and recent Aflutter alternating with sinus tachycardia (HR 100s),: Cont Lopressor to 10 q6h, added timolol eye ggt for attempted systemic affect. Started therapeutic Lovenox on 1/10. s/p previous DCCV, off Amiodarone and Lipitor due to persistent transaminitis. BLE duplex (1/5) no DVT, TTE  (7/18) - PASP 64mmHg, EF 65-70%. holding Losartan & norvasc because not absorbing PO meds, HTN: hydralazine 10 q6  Standing. TTE (1/4) LVEF 75%, mild/mod AR, PASP 40, RVEF wnl. Given Increased fluid in TPN will watch I/O over the next few days.   PULM: Atelectasis/dyspnea improved clinically: cont 1 hr of BiPAP every 12hrs and nasal canula or room air. Encourage OOB and IS. CT from 11/14 with ALDEN ground glass opacity of unclear etiology. COVID negative. RVP negative. Completed 7d empiric cefepime 11/22 to cover for potential PNA.   GI/FEN: Low res, low fat, high protein diet. Cont Ensure max x1/day however pt likely not absorbing adequately due to proximal fistula. Folate, thiamine. PICC replaced 12/4, switched out PICC on 1/4. Continue TPN/lipids, 1L extra in TPN in order to make I/O even. 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 Discussed with Pharmacy and with patient. cont Octreotide & Cholestyramine 10/18. Transaminitis slowly improved, elevated T bili, s/p Perc Deb on 9/11, uncapped on 1/15; seen by Hepatology - MRCP 10/30 no obstruction, cont ursodiol, RUQ US 11/25 CBD 5mm, hepatic vessels patent. Cont Vit D weekly.   : Voids. ELVI improved: stopped famotidine given renal dysfunction. MARLO Duplex and RP US unremarkable, CK wnl.    ENDO: Hx hypothyroid: IV Synthroid,  Repeat thyroid studies WNL 1/3/24.   ID: Leukocytosis, possible PNA, start Cefepime (1/15--) // BCx 11/22 grew candida glabrata, likely CLABSI. s/p Caspo & completed Fluconazole course (12/1-12/9). Ophthalmology evaluated - normal ocular exam. Echo no vegetation. PICC replaced on 12/4. Cont Nystatin powder. // DC: caspofungin (11/24-12/1). empiric 7days cefepime (11/15-11/22), C. tertium, Lactobacillus from IR cx 7/3, and candida albicans, lactobacillus, vanc sensitive E faecium, vanc resistant E gallinarum, vanc resistant E casseliflavus, lactobacillus from OR cx 7/5. Completed course of abx with Imipenem (9/10-9/15). Imipenem (8/26--) imipenem (6/30-7/12, 7/23-7/30), Dapto (6/30-7/5 and 7/23-7/24). Empiric Dapto (8/23-24) and Cefepime (8/23-24).   PPX: SCDs, HOLD Lovenox therapeutic for Afib  HEME: Anemia - continue Epogen weekly. S/p Iron Sucrose 200 qd x 3 days for chronic anemia.   LINES: PIVs, New RUE PICC placed (1/5--) will plan to switch PICC once a month, secondary to history of fungemia // DC: LUE PICC (9/1-11/1 ), RUE PICC: (11/1-11/24), LUE PICC (12/4-1/5)  WOUNDS/DRAINS: Abdominal wound and fistula with wound manager in place dressing change daily. Had recurrent punctate bleeding at wound bed, s/p placed surgicel, attempt to stitch, electrocautery and epi soaked gauze. Cont epinephrine soaked gauze at bedside. RLQ Ostomy. IR perc deb tube uncapped 1/15-- tube study 1/16 // DC: L Clay drain.  PT: Ambulate as tolerated OOB to chair daily.  DISPO: SICU due to complicated hospital course. Goal to dc home in 2weeks: Spoke with case management and wound care on 1/10 to alert them of possible discharge home with services the week of 1/22 vs 1/29.

## 2024-01-16 NOTE — PROGRESS NOTE ADULT - ASSESSMENT
The patient is a 78 year old male with a PMHx of Crohn's, AFib/Flutter s/p DCCVs on amiodarone, remote ileocectomy and open appendectomy. Admitted (6/23) for SBO vs Crohns flare, s/p NGT decompression and s/p lap converted to open TRE, SBR x 3, left in discontinuity with abthera vac on (6/27), RTOR for ileocolic resection, small bowel anastomosis, and abdominal wall closure on (6/28), c/b fluid collection s/p IR aspiration of perihepatic fluid on (7/3), c/b wound dehiscence s/p RTOR exlap, washout, ileocolic resection with end ileostomy, blow hole colostomy, red rubber from ileostomy to small bowel anastomosis; vicryl bridging mesh on (7/5) transferred to SICU postoperatively for hemodynamic monitoring, with hospital course complicated by periods of severe ELVI and hyponatremia, which resolved but stepped back up for to SICU on (9/10) for acute AMS, intermittent hypoglycemia, AFib with RVR. Percutaneous Cholecystostomy placed on (9/11) for enlarged GB, PCT capped 10/5 and uncapped 10/25 for hyperbilirubinemia, Right brachial DVT, left basillic and cephalic superficial thrombus on duplex 11/2, CT 11/14 with ALDEN ground glass opacity of unclear etiology, completed empiric 7day cefepime course 11/22, rising T-bili of unclear etiology now w resolved candida glabrata fungemia, ELVI improving.     Reg Diet  Pain/nausea control prn  Cefepime per ID/SICU  BiPAP q4hprn  Wound manager changes daily   C/W octreotide, cholestyramine  C/W Epogen weekly  holding SQL/SCDs/OOBA/IS  Perc cony to gravity   Blood cultures with ngtd, will follow up bacterial and fungal growth  Rest of care per SICU

## 2024-01-16 NOTE — CHART NOTE - NSCHARTNOTEFT_GEN_A_CORE
Admitting Diagnosis:   Patient is a 78y old  Male who presents with a chief complaint of Surgery (15 Sukhdev 2024 16:26)      PAST MEDICAL & SURGICAL HISTORY:  Essential hypertension  Hypertension      Atrial fibrillation  s/p cardioversion 2010 and 2014  Pt. reports 4 DCCV  Now on Amiodarone 200mg PO bid and Eliquis 5mg PO bid  Last DCCV 4 yrs ago at [facility]      Crohn's disease  s/p partial resection of ileum      Hyperlipidemia      Hypothyroidism      History of depression  On Venlafaxine ER 150mg PO bid      Junctional rhythm      H/O knee surgery      History of cataract surgery          Current Nutrition Order: TPN via PICC- 3L bag running over 14hrs, providing 380g Dex, 120g AA, 50g SMOF lipids daily; provides 2272 kcals, 120g protein daily, GIR 4.81 mg/kg/min   PO- Regular diet + 2 LPS per day [provide 100 kcals, 15g protein each], + Ensure Max daily [150 kcals, 20g protein]    PO Intake: Good (%) [ X  ]  Fair (50-75%) [   ] Poor (<25%) [   ]    GI Issues:   1/16/24: ileostomy output 60cc x 24hrs, abdominal wound/fistula output 1375cc x 24hrs, per cony output 1025cc x 24hrs [capped 1/2, uncapped 1/15]  1/11/24: ileostomy output 0cc x 24hrs, abdominal wound/fistula output 1750cc x 24hrs, per cony output 0cc x 24hrs [capped 1/2]  1/8/24: noted brownish output from [id number], total amount not documented; abdominal wound/fistula output 2285cc x 24hrs, per cony output 0cc x 24hrs [capped 1/2]  1/3/24:  ileostomy output 0cc x 24hrs, abdominal wound/fistula output 2550cc x 24hrs, per cony output 275cc x 24hrs  12/29: ileostomy output 50cc x 24hrs, abdominal wound/fistula output 910cc x 24hrs, per cony output 1225cc x 24hrs  12/26: ileostomy output 50cc x 24hrs, abdominal wound/fistula output 1175cc x 24hrs, per cony output 1050cc x 24hrs  12/21: ileostomy output 50cc x 24hrs, abdominal wound/fistula output 1100cc x 24hrs, per cony output 1050cc x 24hrs     12/19: ileostomy output 20cc x 24hrs, abdominal wound/fistula output 1275cc x 24hrs, per cony output 775cc x 24hrs    12/14: ileostomy output 15cc x 24hrs, abdominal wound/fistula output  1230cc x 24hrs, per cony output 1010cc x 24hrs    12/12: ileostomy output 75cc x 24hrs, abdominal wound/fistula output  600cc x 24hrs, per cony output 1125cc x 24hrs    12/5: ileostomy output 35cc x 24hrs, abdominal wound/fistula output  250cc x 24hrs, per cony output 745cc x 24hrs    12/1: ileostomy output 30cc x 24hrs, abdominal wound/fistula output  2400cc x 24hrs, per cony output 550cc x 24hrs   11/27: ileostomy output 50 cc x 24hrs, abdominal wound/fistula output 2175 cc x 24hrs, per cony output 530 cc x 24hrs   11/22: ileostomy output 25cc x 24hrs, abdominal wound/fistula output 2350 cc x 24hrs, per cony output 775cc x 24hrs     Pain: no pain/discomfort noted    Skin Integrity: abd wound and fistula with wound manager in place, RLQ ileostomy, perc cony drain. Rudy score 18     Labs:   01-16    137  |  105  |  37<H>  ----------------------------<  140<H>  4.7   |  24  |  1.30    Ca    8.1<L>      16 Jan 2024 05:30  Phos  3.2     01-16  Mg     2.1     01-16    TPro  7.8  /  Alb  2.4<L>  /  TBili  5.7<H>  /  DBili  3.6<H>  /  AST  108<H>  /  ALT  71<H>  /  AlkPhos  105  01-16    CAPILLARY BLOOD GLUCOSE          Medications:  MEDICATIONS  (STANDING):  cefepime  Injectable. 2000 milliGRAM(s) IV Push every 12 hours  chlorhexidine 2% Cloths 1 Application(s) Topical <User Schedule>  cholestyramine Powder (Sugar-Free) 4 Gram(s) Oral daily  epoetin matt-epbx (RETACRIT) Injectable 21454 Unit(s) SubCutaneous every 7 days  ergocalciferol 65724 Unit(s) Oral <User Schedule>  gabapentin 100 milliGRAM(s) Oral at bedtime  glucagon  Injectable 1 milliGRAM(s) IntraMuscular once  hydrALAZINE Injectable 10 milliGRAM(s) IV Push every 12 hours  levothyroxine Injectable 30 MICROGram(s) IV Push at bedtime  lipid, fat emulsion (Fish Oil and Plant Based) 20% Infusion 0.54 Gm/kG/Day (20.83 mL/Hr) IV Continuous <Continuous>  lipid, fat emulsion (Fish Oil and Plant Based) 20% Infusion 0.54 Gm/kG/Day (20.83 mL/Hr) IV Continuous <Continuous>  melatonin 5 milliGRAM(s) Oral at bedtime  metoprolol tartrate Injectable 10 milliGRAM(s) IV Push every 6 hours  Parenteral Nutrition - Adult 1 Each TPN Continuous <Continuous>  Parenteral Nutrition - Adult 1 Each TPN Continuous <Continuous>  timolol 0.5% Solution 1 Drop(s) Both EYES daily  ursodiol Suspension 300 milliGRAM(s) Oral every 8 hours  venlafaxine XR. 150 milliGRAM(s) Oral daily    MEDICATIONS  (PRN):  chlorhexidine 0.12% Liquid 15 milliLiter(s) Swish and Spit two times a day PRN oral hygeine  EPINEPHrine   1 mG/mL (1:1,000) Topical Solution 1 Application(s) Topical every 24 hours PRN for wound bleeding  LORazepam   Injectable 1 milliGRAM(s) IV Push <User Schedule> PRN Anxiety  ondansetron Injectable 4 milliGRAM(s) IV Push every 6 hours PRN Nausea and/or Vomiting      Weight: 94kg [07 Jan 2024]  Daily 93.3kg [19 Dec 2023]  Daily 93.3kg [11 Dec 2023]  Daily 93.3kg [08 Dec 2023]  Daily 93.3kg [6 Dec 2023]  Daily 94.2kg [28 Nov 2023]  Daily 94.4kg [27 Nov 2023]  Daily 94.2kg [26 Nov 2023]  Daily 94.2kg [22 Nov 2023]  Daily 94.2kg [16 Nov 2023]  Daily 94.2 [15 Nov 2023]  Daily 94.2kg [13 Nov 2023]  Daily 95.8kg [09 Nov 2023]  Daily 94.2kg [07 Nov 2023]  Daily 85.2kg [03 Nov 2023]  Daily 85.2kg [31 Oct 2023]  Daily 85.2kg [24 Oct 2023]  Daily 85.2kg [20 Oct 2023]  Daily 81.9kg [18 Oct 2023]  Daily 85.2kg [16 Oct 2023]  Daily   95.2kg [12 Oct 2023]   Daily 87.7kg [11 Oct 2023]  Daily 87.4kg [09 Oct 2023]  Daily 86.4kg [07 Oct 2023]  Daily 82.5kg [06 Oct 2023]  Daily 82.6kg [4 Oct 2023]   Daily 83.5kg [03 Oct 2023]  Daily 84.5kg [2 Oct 2023]  Daily 87.2kg [1 Oct 2023]  Daily 88.3kg [29 Sep 2023]  Daily 89.9kg [28 Sep 2023]  Daily 87.7kg [24 Sep 2023]  Daily 90.4kg [21 Sep 2023]  Daily 91.4kg [20 Sep 2023]  Daily 86.7kg [18 Sep 2023]  Daily 88.1kg [16 Sep 2023]  Daily 88.9kg [15 Sep 2023]   Daily 89.1 [14 Sep 2023]  Daily 92.9kg [6 Sep 2023]  Daily 91.8kg [27 Aug 2023]  Daily 101kg [9 Aug 2023]     Weight Change: weight previously trending down, then up- now appears to be stable x ~2 months. Recommend continue weekly/daily weights for trending & ensuring adequacy of nutrition provision    Estimated energy needs:   Ideal body weight (86.4kg) used for calculations as pt >100% IBW and BMI <30 per [facility] Standards of Care. Needs estimated for age and adjusted for current clinical status, increased needs for post-op & open abd wound healing    Calories: 4734-0057 kcals based on 30-40 kcals/kg  Protein: 129.6-172.8 g protein based on 1.5-2.0g protein/kg + additional depending on outputs  *Fluid needs per team    Subjective:   78 M w/ Crohn's, AFib/Flutter s/p DCCVs on amiodarone, remote ileocectomy and open appendectomy. Admitted (6/23) for SBO vs Crohns flare, s/p NGT decompression and s/p lap TRE converted to open TRE, SBR x 3, left in discontinuity with abthera vac on (6/27), RTOR for ileocolic resection, small bowel anastomosis, and abdominal wall closure on (6/28), c/b fluid collection s/p IR aspiration of perihepatic fluid on (7/3), c/b wound dehiscence s/p RTOR exlap, washout, ileocolic resection with end ileostomy, blow hole colostomy, red rubber from ileostomy to small bowel anastomosis; vicryl bridging mesh on (7/5) transferred to SICU postoperatively for hemodynamic monitoring, with hospital course complicated by periods of AMS most recently on 9/1 and associated with oliguria, severe ELVI and hyponatremia, which resolved but stepped back up for to SICU on 9/10 for acute AMS, intermittent hypoglycemia, AFib with RVR. Percutaneous Cholecystostomy placed on 9/11 for enlarged GB, PCT capped 10/5 and uncapped 10/25 for hyperbilirubinemia, Right brachial DVT, left basillic and cephalic superficial thrombus on duplex 11/2, CT 11/14 with ALDEN ground glass opacity of unclear etiology, completed empiric 7day cefepime course 11/22, rising T-bili of unclear etilogy, now w resolved candida glabrata fungemia, ELVI improving.     Chart reviewed, discussed with team. Rx and meds reviewed. 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 Continues on 3L TPN to maintain I&Os. Per cony tube uncapped 1/15, plan for tube study 1/16. Plan to start Gattex to improve absorption/decrease TPN as able. Recommended amino acid tagged scan prior to initiation and following medication to monitor nutrient absorption, adjust nutrition provision prn to prevent over/underfeeding. Labs: zinc 120 <H> [12/22], selenium 75 <H> [12/22], copper 12 <H> [12/22]. Recommend decrease zinc to 3x weekly to better allow for copper absorption. calcitriol 6.1 <L>, BUN 37 <H>, Creat WNL [continue adjusting free water in TPN prn],  [1/16], total bilirubin 5.7 <H>,  <H>, ALT 71 <H>, direct bilirubin 3.6 <H>. Meds- 50,000 units vitamin D weekly, synthroid [administer 30-60 minutes before food; separate at least 4hrs from calcium or iron-containing products or bile acid sequestrants], zofran prn, synthroid [administer 30-60 minutes before food; separate at least 4hrs from calcium or iron-containing products or bile acid sequestrants], EPO, ursodiol, cholestyramine. RDN will continue to monitor, reassess, and intervene as appropriate.     Previous Nutrition Diagnosis:  1. Increased Nutrient Needs R/T physiological demands for nutrient AEB post-op wound healing  Active [ X ]  Resolved [   ]    2. Altered GI function R/T extensive GI history, insufficient bowel, high output fistula AEB supplemental PN requirement to meet nutrition needs  Active [ X  ]  Resolved [   ]     If resolved, new PES:     Goal:  Pt will meet at least 75% of protein & energy needs via most appropriate route for nutrition     Recommendations:  1. c/w TPN via PICC as primary means to nutrition - 3L bag running over 14hrs (214ml/hr), providing 380g Dex, 120g AA, 50g SMOF lipids daily; provides 2272 kcals, 120g protein daily, GIR 4.81 mg/kg/min   - provides 26.3 kcals/kg, 20.7 non-protein kcals/kg, 1.4g protein/kg  - monitor weights & wound healing, adjust substrates as indicated  - fluid & lytes per team  - MVI, thiamine, vit C in bag  - decrease zinc to 3x per week, copper & selenium in bag [recheck levels 1/22]  2. PO diet per team discretion  -  c/w Regular diet as medically feasible to allow for more menu options  - c/w 1 LPS BID [200 kcals, 30g protein] + 1 Ensure Max daily [150 kcals, 30g protein] as amenable  - consider NPO for bowel rest as indicated   - ongoing education prn  - close monitoring of weights, fistula outputs, urine outputs & consider UUN & fecal fat studies to monitor changes in PO absorption  3. AA study prior to Gattex and following initiation as able  4. Weekly lipid panel while on TPN  - hold/decrease lipids if TG >400  - continue to trend LFTs  5. c/w Vit D supplementation and recheck levels in 4 wks  6. Recommend weekly weights for trending/ensuring adequacy of nutrition provision  7. Hydration per team  - risk for dehydration with high outputs, monitor  - additional fluids per team  - consider oral rehydration solution  8. Monitor BMP/Mg/Phos, POC BG while on TPN  - monitor & replenish lytes outside TPN bag prn  9. Continue close monitoring of clinical course & adjust recommendations prn      Education: ongoing diet education    Risk Level: High [ X  ] Moderate [   ] Low [   ] Admitting Diagnosis:   Patient is a 78y old  Male who presents with a chief complaint of Surgery (15 Sukhdev 2024 16:26)      PAST MEDICAL & SURGICAL HISTORY:  Essential hypertension  Hypertension      Atrial fibrillation  s/p cardioversion 2010 and 2014  Pt. reports 4 DCCV  Now on Amiodarone 200mg PO bid and Eliquis 5mg PO bid  Last DCCV 4 yrs ago at [facility]      Crohn's disease  s/p partial resection of ileum      Hyperlipidemia      Hypothyroidism      History of depression  On Venlafaxine ER 150mg PO bid      Junctional rhythm      H/O knee surgery      History of cataract surgery          Current Nutrition Order: TPN via PICC- 3L bag running over 14hrs, providing 380g Dex, 120g AA, 50g SMOF lipids daily; provides 2272 kcals, 120g protein daily, GIR 4.81 mg/kg/min   PO- Regular diet + 2 LPS per day [provide 100 kcals, 15g protein each], + Ensure Max daily [150 kcals, 20g protein]    PO Intake: Good (%) [ X  ]  Fair (50-75%) [   ] Poor (<25%) [   ]    GI Issues:   1/16/24: ileostomy output 60cc x 24hrs, abdominal wound/fistula output 1375cc x 24hrs, per cony output 1025cc x 24hrs [capped 1/2, uncapped 1/15]  1/11/24: ileostomy output 0cc x 24hrs, abdominal wound/fistula output 1750cc x 24hrs, per cony output 0cc x 24hrs [capped 1/2]  1/8/24: noted brownish output from [id number], total amount not documented; abdominal wound/fistula output 2285cc x 24hrs, per cony output 0cc x 24hrs [capped 1/2]  1/3/24:  ileostomy output 0cc x 24hrs, abdominal wound/fistula output 2550cc x 24hrs, per cony output 275cc x 24hrs  12/29: ileostomy output 50cc x 24hrs, abdominal wound/fistula output 910cc x 24hrs, per cony output 1225cc x 24hrs  12/26: ileostomy output 50cc x 24hrs, abdominal wound/fistula output 1175cc x 24hrs, per cony output 1050cc x 24hrs  12/21: ileostomy output 50cc x 24hrs, abdominal wound/fistula output 1100cc x 24hrs, per cony output 1050cc x 24hrs     12/19: ileostomy output 20cc x 24hrs, abdominal wound/fistula output 1275cc x 24hrs, per cony output 775cc x 24hrs    12/14: ileostomy output 15cc x 24hrs, abdominal wound/fistula output  1230cc x 24hrs, per cony output 1010cc x 24hrs    12/12: ileostomy output 75cc x 24hrs, abdominal wound/fistula output  600cc x 24hrs, per cony output 1125cc x 24hrs    12/5: ileostomy output 35cc x 24hrs, abdominal wound/fistula output  250cc x 24hrs, per cony output 745cc x 24hrs    12/1: ileostomy output 30cc x 24hrs, abdominal wound/fistula output  2400cc x 24hrs, per cony output 550cc x 24hrs   11/27: ileostomy output 50 cc x 24hrs, abdominal wound/fistula output 2175 cc x 24hrs, per cony output 530 cc x 24hrs   11/22: ileostomy output 25cc x 24hrs, abdominal wound/fistula output 2350 cc x 24hrs, per cony output 775cc x 24hrs     Pain: no pain/discomfort noted    Skin Integrity: abd wound and fistula with wound manager in place, RLQ ileostomy, perc cony drain. Rudy score 18     Labs:   01-16    137  |  105  |  37<H>  ----------------------------<  140<H>  4.7   |  24  |  1.30    Ca    8.1<L>      16 Jan 2024 05:30  Phos  3.2     01-16  Mg     2.1     01-16    TPro  7.8  /  Alb  2.4<L>  /  TBili  5.7<H>  /  DBili  3.6<H>  /  AST  108<H>  /  ALT  71<H>  /  AlkPhos  105  01-16    CAPILLARY BLOOD GLUCOSE          Medications:  MEDICATIONS  (STANDING):  cefepime  Injectable. 2000 milliGRAM(s) IV Push every 12 hours  chlorhexidine 2% Cloths 1 Application(s) Topical <User Schedule>  cholestyramine Powder (Sugar-Free) 4 Gram(s) Oral daily  epoetin matt-epbx (RETACRIT) Injectable 32743 Unit(s) SubCutaneous every 7 days  ergocalciferol 51237 Unit(s) Oral <User Schedule>  gabapentin 100 milliGRAM(s) Oral at bedtime  glucagon  Injectable 1 milliGRAM(s) IntraMuscular once  hydrALAZINE Injectable 10 milliGRAM(s) IV Push every 12 hours  levothyroxine Injectable 30 MICROGram(s) IV Push at bedtime  lipid, fat emulsion (Fish Oil and Plant Based) 20% Infusion 0.54 Gm/kG/Day (20.83 mL/Hr) IV Continuous <Continuous>  lipid, fat emulsion (Fish Oil and Plant Based) 20% Infusion 0.54 Gm/kG/Day (20.83 mL/Hr) IV Continuous <Continuous>  melatonin 5 milliGRAM(s) Oral at bedtime  metoprolol tartrate Injectable 10 milliGRAM(s) IV Push every 6 hours  Parenteral Nutrition - Adult 1 Each TPN Continuous <Continuous>  Parenteral Nutrition - Adult 1 Each TPN Continuous <Continuous>  timolol 0.5% Solution 1 Drop(s) Both EYES daily  ursodiol Suspension 300 milliGRAM(s) Oral every 8 hours  venlafaxine XR. 150 milliGRAM(s) Oral daily    MEDICATIONS  (PRN):  chlorhexidine 0.12% Liquid 15 milliLiter(s) Swish and Spit two times a day PRN oral hygeine  EPINEPHrine   1 mG/mL (1:1,000) Topical Solution 1 Application(s) Topical every 24 hours PRN for wound bleeding  LORazepam   Injectable 1 milliGRAM(s) IV Push <User Schedule> PRN Anxiety  ondansetron Injectable 4 milliGRAM(s) IV Push every 6 hours PRN Nausea and/or Vomiting      Weight: 94kg [07 Jan 2024]  Daily 93.3kg [19 Dec 2023]  Daily 93.3kg [11 Dec 2023]  Daily 93.3kg [08 Dec 2023]  Daily 93.3kg [6 Dec 2023]  Daily 94.2kg [28 Nov 2023]  Daily 94.4kg [27 Nov 2023]  Daily 94.2kg [26 Nov 2023]  Daily 94.2kg [22 Nov 2023]  Daily 94.2kg [16 Nov 2023]  Daily 94.2 [15 Nov 2023]  Daily 94.2kg [13 Nov 2023]  Daily 95.8kg [09 Nov 2023]  Daily 94.2kg [07 Nov 2023]  Daily 85.2kg [03 Nov 2023]  Daily 85.2kg [31 Oct 2023]  Daily 85.2kg [24 Oct 2023]  Daily 85.2kg [20 Oct 2023]  Daily 81.9kg [18 Oct 2023]  Daily 85.2kg [16 Oct 2023]  Daily   95.2kg [12 Oct 2023]   Daily 87.7kg [11 Oct 2023]  Daily 87.4kg [09 Oct 2023]  Daily 86.4kg [07 Oct 2023]  Daily 82.5kg [06 Oct 2023]  Daily 82.6kg [4 Oct 2023]   Daily 83.5kg [03 Oct 2023]  Daily 84.5kg [2 Oct 2023]  Daily 87.2kg [1 Oct 2023]  Daily 88.3kg [29 Sep 2023]  Daily 89.9kg [28 Sep 2023]  Daily 87.7kg [24 Sep 2023]  Daily 90.4kg [21 Sep 2023]  Daily 91.4kg [20 Sep 2023]  Daily 86.7kg [18 Sep 2023]  Daily 88.1kg [16 Sep 2023]  Daily 88.9kg [15 Sep 2023]   Daily 89.1 [14 Sep 2023]  Daily 92.9kg [6 Sep 2023]  Daily 91.8kg [27 Aug 2023]  Daily 101kg [9 Aug 2023]     Weight Change: weight previously trending down, then up- now appears to be stable x ~2 months. Recommend continue weekly/daily weights for trending & ensuring adequacy of nutrition provision    Estimated energy needs:   Ideal body weight (86.4kg) used for calculations as pt >100% IBW and BMI <30 per [facility] Standards of Care. Needs estimated for age and adjusted for current clinical status, increased needs for post-op & open abd wound healing    Calories: 2937-2237 kcals based on 30-40 kcals/kg  Protein: 129.6-172.8 g protein based on 1.5-2.0g protein/kg + additional depending on outputs  *Fluid needs per team    Subjective:   78 M w/ Crohn's, AFib/Flutter s/p DCCVs on amiodarone, remote ileocectomy and open appendectomy. Admitted (6/23) for SBO vs Crohns flare, s/p NGT decompression and s/p lap TRE converted to open TRE, SBR x 3, left in discontinuity with abthera vac on (6/27), RTOR for ileocolic resection, small bowel anastomosis, and abdominal wall closure on (6/28), c/b fluid collection s/p IR aspiration of perihepatic fluid on (7/3), c/b wound dehiscence s/p RTOR exlap, washout, ileocolic resection with end ileostomy, blow hole colostomy, red rubber from ileostomy to small bowel anastomosis; vicryl bridging mesh on (7/5) transferred to SICU postoperatively for hemodynamic monitoring, with hospital course complicated by periods of AMS most recently on 9/1 and associated with oliguria, severe ELVI and hyponatremia, which resolved but stepped back up for to SICU on 9/10 for acute AMS, intermittent hypoglycemia, AFib with RVR. Percutaneous Cholecystostomy placed on 9/11 for enlarged GB, PCT capped 10/5 and uncapped 10/25 for hyperbilirubinemia, Right brachial DVT, left basillic and cephalic superficial thrombus on duplex 11/2, CT 11/14 with ALDEN ground glass opacity of unclear etiology, completed empiric 7day cefepime course 11/22, rising T-bili of unclear etilogy, now w resolved candida glabrata fungemia, ELVI improving.     Chart reviewed, discussed with team. Rx and meds reviewed. 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 Continues on 3L TPN to maintain I&Os. Per cony tube uncapped 1/15, plan for tube study 1/16. Plan to start Gattex to improve absorption/decrease TPN as able. Recommended amino acid tagged scan prior to initiation and following medication to monitor nutrient absorption, adjust nutrition provision prn to prevent over/underfeeding. Labs: zinc 120 <H> [12/22], selenium 75 <H> [12/22], copper 12 <H> [12/22]. Recommend decrease zinc to 3x weekly to better allow for copper absorption. calcitriol 6.1 <L>, BUN 37 <H>, Creat WNL [continue adjusting free water in TPN prn],  [1/16], total bilirubin 5.7 <H>,  <H>, ALT 71 <H>, direct bilirubin 3.6 <H>. Meds- 50,000 units vitamin D weekly, synthroid [administer 30-60 minutes before food; separate at least 4hrs from calcium or iron-containing products or bile acid sequestrants], zofran prn, synthroid [administer 30-60 minutes before food; separate at least 4hrs from calcium or iron-containing products or bile acid sequestrants], EPO, ursodiol, cholestyramine. RDN will continue to monitor, reassess, and intervene as appropriate.     Previous Nutrition Diagnosis:  1. Increased Nutrient Needs R/T physiological demands for nutrient AEB post-op wound healing  Active [ X ]  Resolved [   ]    2. Altered GI function R/T extensive GI history, insufficient bowel, high output fistula AEB supplemental PN requirement to meet nutrition needs  Active [ X  ]  Resolved [   ]     If resolved, new PES:     Goal:  Pt will meet at least 75% of protein & energy needs via most appropriate route for nutrition     Recommendations:  1. c/w TPN via PICC as primary means to nutrition - 3L bag running over 14hrs (214ml/hr), providing 380g Dex, 120g AA, 50g SMOF lipids daily; provides 2272 kcals, 120g protein daily, GIR 4.81 mg/kg/min   - provides 26.3 kcals/kg, 20.7 non-protein kcals/kg, 1.4g protein/kg  - monitor weights & wound healing, adjust substrates as indicated  - fluid & lytes per team  - MVI, thiamine, vit C in bag  - decrease zinc to 3x per week, copper & selenium in bag [recheck levels 1/22]  2. PO diet per team discretion  -  c/w Regular diet as medically feasible to allow for more menu options  - c/w 1 LPS BID [200 kcals, 30g protein] + 1 Ensure Max daily [150 kcals, 30g protein] as amenable  - consider NPO for bowel rest as indicated   - ongoing education prn  - close monitoring of weights, fistula outputs, urine outputs & consider UUN & fecal fat studies to monitor changes in PO absorption  3. AA study prior to Gattex and following initiation as able  4. Weekly lipid panel while on TPN  - hold/decrease lipids if TG >400  - continue to trend LFTs  5. c/w Vit D supplementation and recheck levels in 4 wks  6. Recommend weekly weights for trending/ensuring adequacy of nutrition provision  7. Hydration per team  - risk for dehydration with high outputs, monitor  - additional fluids per team  - consider oral rehydration solution  8. Monitor BMP/Mg/Phos, POC BG while on TPN  - monitor & replenish lytes outside TPN bag prn  9. Continue close monitoring of clinical course & adjust recommendations prn      Education: ongoing diet education    Risk Level: High [ X  ] Moderate [   ] Low [   ]

## 2024-01-16 NOTE — PROGRESS NOTE ADULT - SUBJECTIVE AND OBJECTIVE BOX
SUBJECTIVE: Pt seen and examined at bedside this am by surgery team. Patient is lying comfortably in bed, feels well with no complaints.   Sepsis workup in progress for increasing leukocytosis, blood cultures with ngtd, started on cefepime for possible pneumonia vs atelectasis    MEDICATIONS  (STANDING):  cefepime  Injectable. 2000 milliGRAM(s) IV Push every 12 hours  chlorhexidine 2% Cloths 1 Application(s) Topical <User Schedule>  cholestyramine Powder (Sugar-Free) 4 Gram(s) Oral daily  epoetin matt-epbx (RETACRIT) Injectable 53797 Unit(s) SubCutaneous every 7 days  ergocalciferol 09148 Unit(s) Oral <User Schedule>  gabapentin 100 milliGRAM(s) Oral at bedtime  glucagon  Injectable 1 milliGRAM(s) IntraMuscular once  hydrALAZINE Injectable 10 milliGRAM(s) IV Push every 6 hours  levothyroxine Injectable 30 MICROGram(s) IV Push at bedtime  lipid, fat emulsion (Fish Oil and Plant Based) 20% Infusion 0.54 Gm/kG/Day (20.83 mL/Hr) IV Continuous <Continuous>  lipid, fat emulsion (Fish Oil and Plant Based) 20% Infusion 0.54 Gm/kG/Day (20.83 mL/Hr) IV Continuous <Continuous>  melatonin 5 milliGRAM(s) Oral at bedtime  metoprolol tartrate Injectable 10 milliGRAM(s) IV Push every 6 hours  Parenteral Nutrition - Adult 1 Each TPN Continuous <Continuous>  Parenteral Nutrition - Adult 1 Each TPN Continuous <Continuous>  timolol 0.5% Solution 1 Drop(s) Both EYES daily  ursodiol Suspension 300 milliGRAM(s) Oral every 8 hours  venlafaxine XR. 150 milliGRAM(s) Oral daily    MEDICATIONS  (PRN):  chlorhexidine 0.12% Liquid 15 milliLiter(s) Swish and Spit two times a day PRN oral hygeine  EPINEPHrine   1 mG/mL (1:1,000) Topical Solution 1 Application(s) Topical every 24 hours PRN for wound bleeding  LORazepam   Injectable 1 milliGRAM(s) IV Push <User Schedule> PRN Anxiety  ondansetron Injectable 4 milliGRAM(s) IV Push every 6 hours PRN Nausea and/or Vomiting      Vital Signs Last 24 Hrs  T(C): 36.8 (16 Jan 2024 05:16), Max: 36.8 (16 Jan 2024 05:16)  T(F): 98.2 (16 Jan 2024 05:16), Max: 98.2 (16 Jan 2024 05:16)  HR: 94 (16 Jan 2024 05:53) (94 - 106)  BP: 139/65 (16 Jan 2024 05:53) (110/57 - 161/79)  BP(mean): 94 (16 Jan 2024 05:53) (78 - 112)  RR: 21 (16 Jan 2024 05:53) (15 - 31)  SpO2: 97% (16 Jan 2024 05:53) (96% - 100%)    Parameters below as of 16 Jan 2024 05:53  Patient On (Oxygen Delivery Method): room air        Physical Exam  General: Patient is doing well and lying in bed comfortably  Constitutional: alert and awake, A&O x 4  Pulm: no respiratory distress  CV: Regular rate and rhythm, in and out of afib/flutter  Abd: soft, nontender, nondistended. No rebound, no guarding. Wound manager in place, no bleeding, no leaks. soft semi-formed output, brown in color; perc cony to gravity   Extremities: warm, well perfused    I&O's Detail    14 Jan 2024 07:01  -  15 Sukhdev 2024 07:00  --------------------------------------------------------  IN:    Fat Emulsion (Fish Oil &amp; Plant Based) 20% Infusion: 228.8 mL    Oral Fluid: 1020 mL    TPN (Total Parenteral Nutrition): 4282 mL  Total IN: 5530.8 mL    OUT:    Drain (mL): 0 mL    Drain (mL): 1475 mL    Drain (mL): 125 mL    Voided (mL): 1550 mL  Total OUT: 3150 mL    Total NET: 2380.8 mL      15 Sukhdev 2024 07:01  -  16 Jan 2024 06:53  --------------------------------------------------------  IN:    Fat Emulsion (Fish Oil &amp; Plant Based) 20% Infusion: 208 mL    Oral Fluid: 260 mL    TPN (Total Parenteral Nutrition): 2808 mL  Total IN: 3276 mL    OUT:    Drain (mL): 1375 mL    Drain (mL): 60 mL    Drain (mL): 1025 mL    Voided (mL): 1350 mL  Total OUT: 3810 mL    Total NET: -534 mL        LABS:                        8.5    12.47 )-----------( 168      ( 16 Jan 2024 05:30 )             26.7     01-16    137  |  105  |  37<H>  ----------------------------<  140<H>  4.7   |  24  |  1.30    Ca    8.1<L>      16 Jan 2024 05:30  Phos  3.2     01-16  Mg     2.1     01-16    TPro  7.8  /  Alb  2.4<L>  /  TBili  5.7<H>  /  DBili  3.6<H>  /  AST  108<H>  /  ALT  71<H>  /  AlkPhos  105  01-16      Urinalysis Basic - ( 16 Jan 2024 05:30 )    Color: x / Appearance: x / SG: x / pH: x  Gluc: 140 mg/dL / Ketone: x  / Bili: x / Urobili: x   Blood: x / Protein: x / Nitrite: x   Leuk Esterase: x / RBC: x / WBC x   Sq Epi: x / Non Sq Epi: x / Bacteria: x     SUBJECTIVE: Pt seen and examined at bedside this am by surgery team. Patient is lying comfortably in bed, feels well with no complaints.   Sepsis workup in progress for increasing leukocytosis, blood cultures with ngtd, started on cefepime for possible pneumonia vs atelectasis    MEDICATIONS  (STANDING):  cefepime  Injectable. 2000 milliGRAM(s) IV Push every 12 hours  chlorhexidine 2% Cloths 1 Application(s) Topical <User Schedule>  cholestyramine Powder (Sugar-Free) 4 Gram(s) Oral daily  epoetin matt-epbx (RETACRIT) Injectable 26698 Unit(s) SubCutaneous every 7 days  ergocalciferol 75144 Unit(s) Oral <User Schedule>  gabapentin 100 milliGRAM(s) Oral at bedtime  glucagon  Injectable 1 milliGRAM(s) IntraMuscular once  hydrALAZINE Injectable 10 milliGRAM(s) IV Push every 6 hours  levothyroxine Injectable 30 MICROGram(s) IV Push at bedtime  lipid, fat emulsion (Fish Oil and Plant Based) 20% Infusion 0.54 Gm/kG/Day (20.83 mL/Hr) IV Continuous <Continuous>  lipid, fat emulsion (Fish Oil and Plant Based) 20% Infusion 0.54 Gm/kG/Day (20.83 mL/Hr) IV Continuous <Continuous>  melatonin 5 milliGRAM(s) Oral at bedtime  metoprolol tartrate Injectable 10 milliGRAM(s) IV Push every 6 hours  Parenteral Nutrition - Adult 1 Each TPN Continuous <Continuous>  Parenteral Nutrition - Adult 1 Each TPN Continuous <Continuous>  timolol 0.5% Solution 1 Drop(s) Both EYES daily  ursodiol Suspension 300 milliGRAM(s) Oral every 8 hours  venlafaxine XR. 150 milliGRAM(s) Oral daily    MEDICATIONS  (PRN):  chlorhexidine 0.12% Liquid 15 milliLiter(s) Swish and Spit two times a day PRN oral hygeine  EPINEPHrine   1 mG/mL (1:1,000) Topical Solution 1 Application(s) Topical every 24 hours PRN for wound bleeding  LORazepam   Injectable 1 milliGRAM(s) IV Push <User Schedule> PRN Anxiety  ondansetron Injectable 4 milliGRAM(s) IV Push every 6 hours PRN Nausea and/or Vomiting      Vital Signs Last 24 Hrs  T(C): 36.8 (16 Jan 2024 05:16), Max: 36.8 (16 Jan 2024 05:16)  T(F): 98.2 (16 Jan 2024 05:16), Max: 98.2 (16 Jan 2024 05:16)  HR: 94 (16 Jan 2024 05:53) (94 - 106)  BP: 139/65 (16 Jan 2024 05:53) (110/57 - 161/79)  BP(mean): 94 (16 Jan 2024 05:53) (78 - 112)  RR: 21 (16 Jan 2024 05:53) (15 - 31)  SpO2: 97% (16 Jan 2024 05:53) (96% - 100%)    Parameters below as of 16 Jan 2024 05:53  Patient On (Oxygen Delivery Method): room air        Physical Exam  General: Patient is doing well and lying in bed comfortably  Constitutional: alert and awake, A&O x 4  Pulm: no respiratory distress  CV: Regular rate and rhythm, in and out of afib/flutter  Abd: soft, nontender, nondistended. No rebound, no guarding. Wound manager in place, no bleeding, no leaks. soft semi-formed output, brown in color; perc cony to gravity   Extremities: warm, well perfused    I&O's Detail    14 Jan 2024 07:01  -  15 Sukhdev 2024 07:00  --------------------------------------------------------  IN:    Fat Emulsion (Fish Oil &amp; Plant Based) 20% Infusion: 228.8 mL    Oral Fluid: 1020 mL    TPN (Total Parenteral Nutrition): 4282 mL  Total IN: 5530.8 mL    OUT:    Drain (mL): 0 mL    Drain (mL): 1475 mL    Drain (mL): 125 mL    Voided (mL): 1550 mL  Total OUT: 3150 mL    Total NET: 2380.8 mL      15 Sukhdev 2024 07:01  -  16 Jan 2024 06:53  --------------------------------------------------------  IN:    Fat Emulsion (Fish Oil &amp; Plant Based) 20% Infusion: 208 mL    Oral Fluid: 260 mL    TPN (Total Parenteral Nutrition): 2808 mL  Total IN: 3276 mL    OUT:    Drain (mL): 1375 mL    Drain (mL): 60 mL    Drain (mL): 1025 mL    Voided (mL): 1350 mL  Total OUT: 3810 mL    Total NET: -534 mL        LABS:                        8.5    12.47 )-----------( 168      ( 16 Jan 2024 05:30 )             26.7     01-16    137  |  105  |  37<H>  ----------------------------<  140<H>  4.7   |  24  |  1.30    Ca    8.1<L>      16 Jan 2024 05:30  Phos  3.2     01-16  Mg     2.1     01-16    TPro  7.8  /  Alb  2.4<L>  /  TBili  5.7<H>  /  DBili  3.6<H>  /  AST  108<H>  /  ALT  71<H>  /  AlkPhos  105  01-16      Urinalysis Basic - ( 16 Jan 2024 05:30 )    Color: x / Appearance: x / SG: x / pH: x  Gluc: 140 mg/dL / Ketone: x  / Bili: x / Urobili: x   Blood: x / Protein: x / Nitrite: x   Leuk Esterase: x / RBC: x / WBC x   Sq Epi: x / Non Sq Epi: x / Bacteria: x

## 2024-01-16 NOTE — PROGRESS NOTE ADULT - ASSESSMENT
78 year old male w/ hx Crohn’s disease and prolonged hospitalization since 6/23, admitted for SBO s/p open TRE, SBR x 3, left in discontinuity with abthera, then RTOR for ileocolic resection and small bowel anastamosis with abdominal closure (6/28) c/b fluid collection/feculent peritonitis s/p IR aspiration (7/3) and then exlap, washout, ileocolic resection with end ileostomy, blow hole colostomy, red rubber from ileostomy to small bowel anastomosis, and vicryl bridging mesh (7/5) with abdominal cx at that time w/ VRE.casseliflavus, VRE.gallinarum E.faecium, Clostridium tertium, L.rhamnosus, C.albicans, then possible acalculous cholecystitis s/p perc cony (9/11) with negative bile fluid cx, then had CT with [facility] for which received 7 days cefepime EOT 11/22, but then developed fevers up to 105 on 11/23, associated with hypotension, and rising leukocytosis to 12k, UA neg, BCx x2 with C.glabrata. RUE PICC line removed 11/24 (had been receiving TPN through this line).     He was been doing well since early December.  Now WBC downtrending. No fever. CXR with left basilar opacity c/f PNA    Recommend:  1. Continue with Cefepime 2 grams IV q12 (CrCl 63) to complete a 7 day course   2.  Monitor CBC with differential  3.  Follow up blood cultures    ID team 1 will sign off.  78 year old male w/ hx Crohn’s disease and prolonged hospitalization since 6/23, admitted for SBO s/p open TRE, SBR x 3, left in discontinuity with abthera, then RTOR for ileocolic resection and small bowel anastamosis with abdominal closure (6/28) c/b fluid collection/feculent peritonitis s/p IR aspiration (7/3) and then exlap, washout, ileocolic resection with end ileostomy, blow hole colostomy, red rubber from ileostomy to small bowel anastomosis, and vicryl bridging mesh (7/5) with abdominal cx at that time w/ VRE.casseliflavus, VRE.gallinarum E.faecium, Clostridium tertium, L.rhamnosus, C.albicans, then possible acalculous cholecystitis s/p perc cony (9/11) with negative bile fluid cx, then had CT with [facility] for which received 7 days cefepime EOT 11/22, but then developed fevers up to 105 on 11/23, associated with hypotension, and rising leukocytosis to 12k, UA neg, BCx x2 with C.glabrata. RUE PICC line removed 11/24 (had been receiving TPN through this line).     He was been doing well since early December.  Now WBC downtrending. No fever. CXR with left basilar opacity c/f PNA    Recommend:  1. Continue with Cefepime 2 grams IV q12 (CrCl 63) to complete a 7 day course (ends 1/22/24)  2.  Monitor CBC with differential  3.  Follow up blood cultures    ID team 1 will sign off.

## 2024-01-16 NOTE — PROGRESS NOTE ADULT - SUBJECTIVE AND OBJECTIVE BOX
Infectious Disease Progress Note:    No acute events overnight. Pt reporting some orthopnea when he lies flat. Otherwise reports stable non productive cough and SOB.  SBO (SMALL BOWEL OBSTRUCTION)      Monoclonal gammopathy        ROS:  CONSTITUTIONAL:  Negative fever or chills, feels well, good appetite  EYES:  Negative  blurry vision or double vision  CARDIOVASCULAR:  Negative for chest pain or palpitations  RESPIRATORY:  Negative for cough, wheezing, or SOB   GASTROINTESTINAL:  Negative for nausea, vomiting, diarrhea, constipation, or abdominal pain  GENITOURINARY:  Negative frequency, urgency or dysuria  NEUROLOGIC:  No headache, confusion, dizziness, lightheadedness    Allergies    penicillin (Angioedema)    Intolerances        ANTIBIOTICS/RELEVANT:  antimicrobials  cefepime  Injectable. 2000 milliGRAM(s) IV Push every 12 hours    immunologic:  epoetin matt-epbx (RETACRIT) Injectable 46541 Unit(s) SubCutaneous every 7 days    OTHER:  chlorhexidine 0.12% Liquid 15 milliLiter(s) Swish and Spit two times a day PRN  chlorhexidine 2% Cloths 1 Application(s) Topical <User Schedule>  cholestyramine Powder (Sugar-Free) 4 Gram(s) Oral daily  EPINEPHrine   1 mG/mL (1:1,000) Topical Solution 1 Application(s) Topical every 24 hours PRN  ergocalciferol 65832 Unit(s) Oral <User Schedule>  gabapentin 100 milliGRAM(s) Oral at bedtime  glucagon  Injectable 1 milliGRAM(s) IntraMuscular once  hydrALAZINE Injectable 10 milliGRAM(s) IV Push every 12 hours  levothyroxine Injectable 30 MICROGram(s) IV Push at bedtime  lipid, fat emulsion (Fish Oil and Plant Based) 20% Infusion 0.54 Gm/kG/Day IV Continuous <Continuous>  LORazepam   Injectable 1 milliGRAM(s) IV Push <User Schedule> PRN  melatonin 5 milliGRAM(s) Oral at bedtime  metoprolol tartrate Injectable 10 milliGRAM(s) IV Push every 6 hours  ondansetron Injectable 4 milliGRAM(s) IV Push every 6 hours PRN  Parenteral Nutrition - Adult 1 Each TPN Continuous <Continuous>  Parenteral Nutrition - Adult 1 Each TPN Continuous <Continuous>  timolol 0.5% Solution 1 Drop(s) Both EYES daily  ursodiol Suspension 300 milliGRAM(s) Oral every 8 hours  venlafaxine XR. 150 milliGRAM(s) Oral daily      Objective:  Vital Signs Last 24 Hrs  T(C): 36.3 (16 Jan 2024 09:00), Max: 36.8 (16 Jan 2024 05:16)  T(F): 97.3 (16 Jan 2024 09:00), Max: 98.2 (16 Jan 2024 05:16)  HR: 93 (16 Jan 2024 12:00) (93 - 107)  BP: 127/68 (16 Jan 2024 11:55) (127/68 - 151/74)  BP(mean): 91 (16 Jan 2024 11:55) (83 - 107)  RR: 29 (16 Jan 2024 12:00) (15 - 33)  SpO2: 100% (16 Jan 2024 12:00) (96% - 100%)    Parameters below as of 16 Jan 2024 11:55  Patient On (Oxygen Delivery Method): room air        PHYSICAL EXAM:  Constitutional: Well-developed, well nourished  Eyes: PATRICIA, EOMI  Ear/Nose/Throat: no oral lesion, no sinus tenderness on percussion	  Neck:no JVD, no lymphadenopathy, supple  Respiratory: CTA b/l  Cardiovascular: S1S2 RRR, no murmurs  Gastrointestinal: soft, (+) BS, no HSM  Extremities :no e/e/c        LABS:                        8.5    12.47 )-----------( 168      ( 16 Jan 2024 05:30 )             26.7     01-16    137  |  105  |  37<H>  ----------------------------<  140<H>  4.7   |  24  |  1.30    Ca    8.1<L>      16 Jan 2024 05:30  Phos  3.2     01-16  Mg     2.1     01-16    TPro  7.8  /  Alb  2.4<L>  /  TBili  5.7<H>  /  DBili  3.6<H>  /  AST  108<H>  /  ALT  71<H>  /  AlkPhos  105  01-16      Urinalysis Basic - ( 16 Jan 2024 05:30 )    Color: x / Appearance: x / SG: x / pH: x  Gluc: 140 mg/dL / Ketone: x  / Bili: x / Urobili: x   Blood: x / Protein: x / Nitrite: x   Leuk Esterase: x / RBC: x / WBC x   Sq Epi: x / Non Sq Epi: x / Bacteria: x          MICROBIOLOGY:        RADIOLOGY & ADDITIONAL STUDIES: Infectious Disease Progress Note:    No acute events overnight. Pt reporting some orthopnea when he lies flat. Otherwise reports stable non productive cough and SOB.  SBO (SMALL BOWEL OBSTRUCTION)      Monoclonal gammopathy        ROS:  CONSTITUTIONAL:  Negative fever or chills, feels well, good appetite  EYES:  Negative  blurry vision or double vision  CARDIOVASCULAR:  Negative for chest pain or palpitations  RESPIRATORY:  Negative for cough, wheezing, or SOB   GASTROINTESTINAL:  Negative for nausea, vomiting, diarrhea, constipation, or abdominal pain  GENITOURINARY:  Negative frequency, urgency or dysuria  NEUROLOGIC:  No headache, confusion, dizziness, lightheadedness    Allergies    penicillin (Angioedema)    Intolerances        ANTIBIOTICS/RELEVANT:  antimicrobials  cefepime  Injectable. 2000 milliGRAM(s) IV Push every 12 hours    immunologic:  epoetin matt-epbx (RETACRIT) Injectable 12328 Unit(s) SubCutaneous every 7 days    OTHER:  chlorhexidine 0.12% Liquid 15 milliLiter(s) Swish and Spit two times a day PRN  chlorhexidine 2% Cloths 1 Application(s) Topical <User Schedule>  cholestyramine Powder (Sugar-Free) 4 Gram(s) Oral daily  EPINEPHrine   1 mG/mL (1:1,000) Topical Solution 1 Application(s) Topical every 24 hours PRN  ergocalciferol 21270 Unit(s) Oral <User Schedule>  gabapentin 100 milliGRAM(s) Oral at bedtime  glucagon  Injectable 1 milliGRAM(s) IntraMuscular once  hydrALAZINE Injectable 10 milliGRAM(s) IV Push every 12 hours  levothyroxine Injectable 30 MICROGram(s) IV Push at bedtime  lipid, fat emulsion (Fish Oil and Plant Based) 20% Infusion 0.54 Gm/kG/Day IV Continuous <Continuous>  LORazepam   Injectable 1 milliGRAM(s) IV Push <User Schedule> PRN  melatonin 5 milliGRAM(s) Oral at bedtime  metoprolol tartrate Injectable 10 milliGRAM(s) IV Push every 6 hours  ondansetron Injectable 4 milliGRAM(s) IV Push every 6 hours PRN  Parenteral Nutrition - Adult 1 Each TPN Continuous <Continuous>  Parenteral Nutrition - Adult 1 Each TPN Continuous <Continuous>  timolol 0.5% Solution 1 Drop(s) Both EYES daily  ursodiol Suspension 300 milliGRAM(s) Oral every 8 hours  venlafaxine XR. 150 milliGRAM(s) Oral daily      Objective:  Vital Signs Last 24 Hrs  T(C): 36.3 (16 Jan 2024 09:00), Max: 36.8 (16 Jan 2024 05:16)  T(F): 97.3 (16 Jan 2024 09:00), Max: 98.2 (16 Jan 2024 05:16)  HR: 93 (16 Jan 2024 12:00) (93 - 107)  BP: 127/68 (16 Jan 2024 11:55) (127/68 - 151/74)  BP(mean): 91 (16 Jan 2024 11:55) (83 - 107)  RR: 29 (16 Jan 2024 12:00) (15 - 33)  SpO2: 100% (16 Jan 2024 12:00) (96% - 100%)    Parameters below as of 16 Jan 2024 11:55  Patient On (Oxygen Delivery Method): room air        PHYSICAL EXAM:  Constitutional: Well-developed, well nourished  Eyes: PATRICIA, EOMI  Ear/Nose/Throat: no oral lesion, no sinus tenderness on percussion	  Neck:no JVD, no lymphadenopathy, supple  Respiratory: CTA b/l  Cardiovascular: S1S2 RRR, no murmurs  Gastrointestinal: soft, (+) BS, no HSM  Extremities :no e/e/c        LABS:                        8.5    12.47 )-----------( 168      ( 16 Jan 2024 05:30 )             26.7     01-16    137  |  105  |  37<H>  ----------------------------<  140<H>  4.7   |  24  |  1.30    Ca    8.1<L>      16 Jan 2024 05:30  Phos  3.2     01-16  Mg     2.1     01-16    TPro  7.8  /  Alb  2.4<L>  /  TBili  5.7<H>  /  DBili  3.6<H>  /  AST  108<H>  /  ALT  71<H>  /  AlkPhos  105  01-16      Urinalysis Basic - ( 16 Jan 2024 05:30 )    Color: x / Appearance: x / SG: x / pH: x  Gluc: 140 mg/dL / Ketone: x  / Bili: x / Urobili: x   Blood: x / Protein: x / Nitrite: x   Leuk Esterase: x / RBC: x / WBC x   Sq Epi: x / Non Sq Epi: x / Bacteria: x          MICROBIOLOGY:        RADIOLOGY & ADDITIONAL STUDIES:

## 2024-01-16 NOTE — PROGRESS NOTE ADULT - SUBJECTIVE AND OBJECTIVE BOX
Remains afeb  Feels slightly breathless No cough VS stable AF with VR 100s Chest is clear ant No edema Labs reviewed

## 2024-01-16 NOTE — PROGRESS NOTE ADULT - SUBJECTIVE AND OBJECTIVE BOX
1/16: This morning, he feels tired, SOB with immediate improvement when on chair, no pain endorsed. PCT to gravity with bilious output, EAKINS intact with semiformed output. Ileostomy with scant bloody output. No nausea or vomiting    ON: AMRITA     1/15: Held LVX for AM hg 7.1 PRBC given    Refers SOB, fatigue, BiPAP made q4. Uptrending wbc (12), full work up with CXR: left basilar opacity, likely atelectasis versus pneumonia.   RVP negative, Bcx drawn. UA clean. PCT to gravity. WBC 14 now, start     MEDICATIONS  (STANDING):  cefepime  Injectable. 2000 milliGRAM(s) IV Push every 12 hours  chlorhexidine 2% Cloths 1 Application(s) Topical <User Schedule>  cholestyramine Powder (Sugar-Free) 4 Gram(s) Oral daily  epoetin matt-epbx (RETACRIT) Injectable 12845 Unit(s) SubCutaneous every 7 days  ergocalciferol 26079 Unit(s) Oral <User Schedule>  gabapentin 100 milliGRAM(s) Oral at bedtime  glucagon  Injectable 1 milliGRAM(s) IntraMuscular once  hydrALAZINE Injectable 10 milliGRAM(s) IV Push every 12 hours  levothyroxine Injectable 30 MICROGram(s) IV Push at bedtime  lipid, fat emulsion (Fish Oil and Plant Based) 20% Infusion 0.54 Gm/kG/Day (20.83 mL/Hr) IV Continuous <Continuous>  lipid, fat emulsion (Fish Oil and Plant Based) 20% Infusion 0.54 Gm/kG/Day (20.83 mL/Hr) IV Continuous <Continuous>  melatonin 5 milliGRAM(s) Oral at bedtime  metoprolol tartrate Injectable 10 milliGRAM(s) IV Push every 6 hours  Parenteral Nutrition - Adult 1 Each TPN Continuous <Continuous>  Parenteral Nutrition - Adult 1 Each TPN Continuous <Continuous>  timolol 0.5% Solution 1 Drop(s) Both EYES daily  ursodiol Suspension 300 milliGRAM(s) Oral every 8 hours  venlafaxine XR. 150 milliGRAM(s) Oral daily    MEDICATIONS  (PRN):  chlorhexidine 0.12% Liquid 15 milliLiter(s) Swish and Spit two times a day PRN oral hygeine  EPINEPHrine   1 mG/mL (1:1,000) Topical Solution 1 Application(s) Topical every 24 hours PRN for wound bleeding  LORazepam   Injectable 1 milliGRAM(s) IV Push <User Schedule> PRN Anxiety  ondansetron Injectable 4 milliGRAM(s) IV Push every 6 hours PRN Nausea and/or Vomiting      Drips:     ICU Vital Signs Last 24 Hrs  T(C): 36.3 (16 Jan 2024 09:00), Max: 36.8 (16 Jan 2024 05:16)  T(F): 97.3 (16 Jan 2024 09:00), Max: 98.2 (16 Jan 2024 05:16)  HR: 107 (16 Jan 2024 09:00) (94 - 107)  BP: 143/66 (16 Jan 2024 09:00) (110/57 - 151/74)  BP(mean): 94 (16 Jan 2024 09:00) (78 - 107)  ABP: --  ABP(mean): --  RR: 33 (16 Jan 2024 09:00) (15 - 33)  SpO2: 96% (16 Jan 2024 09:00) (96% - 100%)    O2 Parameters below as of 16 Jan 2024 09:00  Patient On (Oxygen Delivery Method): room air    Physical Exam:  General: NAD  HEENT: NC/AT, EOMI, PERRLA, normal hearing, no oral lesions, neck supple w/o LAD  Pulmonary: Nonlabored breathing, no respiratory distress, CTA-B  Cardiovascular: NSR, no murmurs  Abdominal: soft, NT/ND, +BS, no organomegaly  Extremities: WWP, 5/5 strength x 4, no clubbing/cyanosis/edema  Neuro: A/O x3, CNs II-XII grossly intact, normal motor/sensation, no focal deficits  Pulses: palpable distal pulses    Lines/tubes/drains:  Poole:	      Vent settings:      I&O's Summary    15 Sukhdev 2024 07:01  -  16 Jan 2024 07:00  --------------------------------------------------------  IN: 3276 mL / OUT: 3810 mL / NET: -534 mL    16 Jan 2024 07:01  -  16 Jan 2024 10:02  --------------------------------------------------------  IN: 0 mL / OUT: 700 mL / NET: -700 mL        LABS:                        8.5    12.47 )-----------( 168      ( 16 Jan 2024 05:30 )             26.7     01-16    137  |  105  |  37<H>  ----------------------------<  140<H>  4.7   |  24  |  1.30    Ca    8.1<L>      16 Jan 2024 05:30  Phos  3.2     01-16  Mg     2.1     01-16    TPro  7.8  /  Alb  2.4<L>  /  TBili  5.7<H>  /  DBili  3.6<H>  /  AST  108<H>  /  ALT  71<H>  /  AlkPhos  105  01-16      Urinalysis Basic - ( 16 Jan 2024 05:30 )    Color: x / Appearance: x / SG: x / pH: x  Gluc: 140 mg/dL / Ketone: x  / Bili: x / Urobili: x   Blood: x / Protein: x / Nitrite: x   Leuk Esterase: x / RBC: x / WBC x   Sq Epi: x / Non Sq Epi: x / Bacteria: x      CAPILLARY BLOOD GLUCOSE        LIVER FUNCTIONS - ( 16 Jan 2024 05:30 )  Alb: 2.4 g/dL / Pro: 7.8 g/dL / ALK PHOS: 105 U/L / ALT: 71 U/L / AST: 108 U/L / GGT: x             Cultures:Culture Results:   No growth at 12 hours (01-15 @ 09:50)  Culture Results:   No growth at 12 hours (01-15 @ 09:05)      RADIOLOGY & ADDITIONAL STUDIES:     1/16: This morning, he feels tired, SOB with immediate improvement when on chair, no pain endorsed. PCT to gravity with bilious output, EAKINS intact with semiformed output. Ileostomy with scant bloody output. No nausea or vomiting    ON: AMRITA     1/15: Held LVX for AM hg 7.1 PRBC given    Refers SOB, fatigue, BiPAP made q4. Uptrending wbc (12), full work up with CXR: left basilar opacity, likely atelectasis versus pneumonia.   RVP negative, Bcx drawn. UA clean. PCT to gravity. WBC 14 now, start     MEDICATIONS  (STANDING):  cefepime  Injectable. 2000 milliGRAM(s) IV Push every 12 hours  chlorhexidine 2% Cloths 1 Application(s) Topical <User Schedule>  cholestyramine Powder (Sugar-Free) 4 Gram(s) Oral daily  epoetin matt-epbx (RETACRIT) Injectable 20312 Unit(s) SubCutaneous every 7 days  ergocalciferol 52340 Unit(s) Oral <User Schedule>  gabapentin 100 milliGRAM(s) Oral at bedtime  glucagon  Injectable 1 milliGRAM(s) IntraMuscular once  hydrALAZINE Injectable 10 milliGRAM(s) IV Push every 12 hours  levothyroxine Injectable 30 MICROGram(s) IV Push at bedtime  lipid, fat emulsion (Fish Oil and Plant Based) 20% Infusion 0.54 Gm/kG/Day (20.83 mL/Hr) IV Continuous <Continuous>  lipid, fat emulsion (Fish Oil and Plant Based) 20% Infusion 0.54 Gm/kG/Day (20.83 mL/Hr) IV Continuous <Continuous>  melatonin 5 milliGRAM(s) Oral at bedtime  metoprolol tartrate Injectable 10 milliGRAM(s) IV Push every 6 hours  Parenteral Nutrition - Adult 1 Each TPN Continuous <Continuous>  Parenteral Nutrition - Adult 1 Each TPN Continuous <Continuous>  timolol 0.5% Solution 1 Drop(s) Both EYES daily  ursodiol Suspension 300 milliGRAM(s) Oral every 8 hours  venlafaxine XR. 150 milliGRAM(s) Oral daily    MEDICATIONS  (PRN):  chlorhexidine 0.12% Liquid 15 milliLiter(s) Swish and Spit two times a day PRN oral hygeine  EPINEPHrine   1 mG/mL (1:1,000) Topical Solution 1 Application(s) Topical every 24 hours PRN for wound bleeding  LORazepam   Injectable 1 milliGRAM(s) IV Push <User Schedule> PRN Anxiety  ondansetron Injectable 4 milliGRAM(s) IV Push every 6 hours PRN Nausea and/or Vomiting      Drips:     ICU Vital Signs Last 24 Hrs  T(C): 36.3 (16 Jan 2024 09:00), Max: 36.8 (16 Jan 2024 05:16)  T(F): 97.3 (16 Jan 2024 09:00), Max: 98.2 (16 Jan 2024 05:16)  HR: 107 (16 Jan 2024 09:00) (94 - 107)  BP: 143/66 (16 Jan 2024 09:00) (110/57 - 151/74)  BP(mean): 94 (16 Jan 2024 09:00) (78 - 107)  ABP: --  ABP(mean): --  RR: 33 (16 Jan 2024 09:00) (15 - 33)  SpO2: 96% (16 Jan 2024 09:00) (96% - 100%)    O2 Parameters below as of 16 Jan 2024 09:00  Patient On (Oxygen Delivery Method): room air    Physical Exam:  General: NAD  HEENT: NC/AT, EOMI, PERRLA, normal hearing, no oral lesions, neck supple w/o LAD  Pulmonary: Nonlabored breathing, no respiratory distress, CTA-B  Cardiovascular: NSR, no murmurs  Abdominal: soft, NT/ND, +BS, no organomegaly  Extremities: WWP, 5/5 strength x 4, no clubbing/cyanosis/edema  Neuro: A/O x3, CNs II-XII grossly intact, normal motor/sensation, no focal deficits  Pulses: palpable distal pulses    Lines/tubes/drains:  Poole:	      Vent settings:      I&O's Summary    15 Sukhdev 2024 07:01  -  16 Jan 2024 07:00  --------------------------------------------------------  IN: 3276 mL / OUT: 3810 mL / NET: -534 mL    16 Jan 2024 07:01  -  16 Jan 2024 10:02  --------------------------------------------------------  IN: 0 mL / OUT: 700 mL / NET: -700 mL        LABS:                        8.5    12.47 )-----------( 168      ( 16 Jan 2024 05:30 )             26.7     01-16    137  |  105  |  37<H>  ----------------------------<  140<H>  4.7   |  24  |  1.30    Ca    8.1<L>      16 Jan 2024 05:30  Phos  3.2     01-16  Mg     2.1     01-16    TPro  7.8  /  Alb  2.4<L>  /  TBili  5.7<H>  /  DBili  3.6<H>  /  AST  108<H>  /  ALT  71<H>  /  AlkPhos  105  01-16      Urinalysis Basic - ( 16 Jan 2024 05:30 )    Color: x / Appearance: x / SG: x / pH: x  Gluc: 140 mg/dL / Ketone: x  / Bili: x / Urobili: x   Blood: x / Protein: x / Nitrite: x   Leuk Esterase: x / RBC: x / WBC x   Sq Epi: x / Non Sq Epi: x / Bacteria: x      CAPILLARY BLOOD GLUCOSE        LIVER FUNCTIONS - ( 16 Jan 2024 05:30 )  Alb: 2.4 g/dL / Pro: 7.8 g/dL / ALK PHOS: 105 U/L / ALT: 71 U/L / AST: 108 U/L / GGT: x             Cultures:Culture Results:   No growth at 12 hours (01-15 @ 09:50)  Culture Results:   No growth at 12 hours (01-15 @ 09:05)      RADIOLOGY & ADDITIONAL STUDIES:

## 2024-01-16 NOTE — ADVANCED PRACTICE NURSE CONSULT - ASSESSMENT
New Dustin's wound/fistula pouch applied over fistula site. Denuded area still present in approx 1/2 of wound but no bleeding noted. Periwound protected with Cavilon barrier wipe then stoma rings applied to the periwound area, stoma powder over denuded area prior to application of Dustin's pouch. Per RN, blood had been noted from ileostomy over the weekend. Blood also noted in old appliance. New Coloplast soft convex 1 piece appliance placed over ileostomy.

## 2024-01-16 NOTE — PROGRESS NOTE ADULT - ATTENDING COMMENTS
AF, UC, s/p SBR with ileocolic resection, ileostomy, cholecystostomy, enteroatmospheric fistula, LLL pna  physical as above  continue cefepime  WBC is lower today  H/H higher after transfusion of PRBC  continue increased NIV sessions q 4h  continue TPN with lower zinc overall because of copper deficiency as suggested by our nutritionist, Ms. Almazan  continue metoprolol  lovenox on hold with more bleeding

## 2024-01-16 NOTE — PROGRESS NOTE ADULT - ASSESSMENT
78 year old male with a PMHx of Crohn's, AFib/Flutter s/p DCCVs on amiodarone, remote ileocectomy and open appendectomy. Admitted (6/23) for SBO vs Crohns flare, s/p NGT decompression and s/p lap converted to open TRE, SBR x 3, left in discontinuity with abthera vac on (6/27), RTOR for ileocolic resection, small bowel anastomosis, and abdominal wall closure on (6/28), c/b fluid collection s/p IR aspiration of perihepatic fluid on (7/3), c/b wound dehiscence s/p RTOR exlap, washout, ileocolic resection with end ileostomy, blow hole colostomy, red rubber from ileostomy to small bowel anastomosis; vicryl bridging mesh on (7/5) transferred to SICU postoperatively for hemodynamic monitoring, with hospital course complicated by periods of severe ELVI and hyponatremia, which resolved but stepped back up for to SICU on (9/10) for acute AMS, intermittent hypoglycemia, AFib with RVR. Percutaneous Cholecystostomy placed on (9/11) for enlarged GB, PCT capped 10/5 and uncapped 10/25 for hyperbilirubinemia, Right brachial DVT, left basillic and cephalic superficial thrombus on duplex 11/2, CT 11/14 with ALDEN ground glass opacity of unclear etiology, completed empiric 7day cefepime course 11/22, now w resolved candida glabrata fungemia, ELVI improving. Net negative overnight. Responded well to 1 PRBC.       - Continue TPN   - Continue wound care   - AA scan   - Tube study and cap percholecystostomy tube after study  - Team 5 will follow   - Plan discussed with Dr Peterson

## 2024-01-17 NOTE — PROGRESS NOTE ADULT - SUBJECTIVE AND OBJECTIVE BOX
ON: No events overnight. Some bleeding on ileostomy noted in the morning.     MEDICATIONS  (STANDING):  cefepime  Injectable. 2000 milliGRAM(s) IV Push every 12 hours  chlorhexidine 2% Cloths 1 Application(s) Topical <User Schedule>  cholestyramine Powder (Sugar-Free) 4 Gram(s) Oral daily  epoetin matt-epbx (RETACRIT) Injectable 40204 Unit(s) SubCutaneous every 7 days  ergocalciferol 76990 Unit(s) Oral <User Schedule>  gabapentin 100 milliGRAM(s) Oral at bedtime  glucagon  Injectable 1 milliGRAM(s) IntraMuscular once  hydrALAZINE Injectable 10 milliGRAM(s) IV Push every 12 hours  levothyroxine Injectable 30 MICROGram(s) IV Push at bedtime  lipid, fat emulsion (Fish Oil and Plant Based) 20% Infusion 0.54 Gm/kG/Day (20.83 mL/Hr) IV Continuous <Continuous>  melatonin 5 milliGRAM(s) Oral at bedtime  metoprolol tartrate Injectable 10 milliGRAM(s) IV Push every 6 hours  Parenteral Nutrition - Adult 1 Each TPN Continuous <Continuous>  Parenteral Nutrition - Adult 1 Each TPN Continuous <Continuous>  timolol 0.5% Solution 1 Drop(s) Both EYES daily  ursodiol Suspension 300 milliGRAM(s) Oral every 8 hours  venlafaxine XR. 150 milliGRAM(s) Oral daily    MEDICATIONS  (PRN):  chlorhexidine 0.12% Liquid 15 milliLiter(s) Swish and Spit two times a day PRN oral hygeine  EPINEPHrine   1 mG/mL (1:1,000) Topical Solution 1 Application(s) Topical every 24 hours PRN for wound bleeding  LORazepam   Injectable 1 milliGRAM(s) IV Push <User Schedule> PRN Anxiety  ondansetron Injectable 4 milliGRAM(s) IV Push every 6 hours PRN Nausea and/or Vomiting      Drips:     ICU Vital Signs Last 24 Hrs  T(C): 36.3 (17 Jan 2024 08:15), Max: 36.6 (16 Jan 2024 17:25)  T(F): 97.3 (17 Jan 2024 08:15), Max: 97.8 (16 Jan 2024 17:25)  HR: 97 (17 Jan 2024 11:15) (88 - 106)  BP: 96/61 (17 Jan 2024 11:15) (96/61 - 152/77)  BP(mean): 73 (17 Jan 2024 11:15) (73 - 106)  ABP: --  ABP(mean): --  RR: 16 (17 Jan 2024 11:15) (16 - 20)  SpO2: 95% (17 Jan 2024 11:15) (94% - 100%)    O2 Parameters below as of 17 Jan 2024 11:15  Patient On (Oxygen Delivery Method): room air        Physical Exam  General: Patient is doing well and lying in bed comfortably  Constitutional: alert and oriented   Pulm: Nonlabored breathing, no respiratory distress  CV: Regular rate and rhythm, normal sinus rhythm  Abd: soft, nontender, nondistended. No rebound, no guarding. Wound manager in place without leaks, dark brown/green output. Perc cony capped Ileostomy with minimal output.   Extremities: warm, well perfused, no edema      Lines/tubes/drains:  Poole:	      Vent settings:      I&O's Summary    16 Jan 2024 07:01  -  17 Jan 2024 07:00  --------------------------------------------------------  IN: 2734.4 mL / OUT: 3930 mL / NET: -1195.6 mL    17 Jan 2024 07:01  -  17 Jan 2024 16:04  --------------------------------------------------------  IN: 216 mL / OUT: 965 mL / NET: -749 mL        LABS:                        8.5    11.57 )-----------( 164      ( 17 Jan 2024 05:21 )             26.8     01-17    136  |  101  |  44<H>  ----------------------------<  136<H>  4.4   |  28  |  1.40<H>    Ca    8.1<L>      17 Jan 2024 05:21  Phos  4.0     01-17  Mg     2.2     01-17    TPro  8.2  /  Alb  2.3<L>  /  TBili  5.5<H>  /  DBili  3.5<H>  /  AST  110<H>  /  ALT  72<H>  /  AlkPhos  104  01-17      Urinalysis Basic - ( 17 Jan 2024 05:21 )    Color: x / Appearance: x / SG: x / pH: x  Gluc: 136 mg/dL / Ketone: x  / Bili: x / Urobili: x   Blood: x / Protein: x / Nitrite: x   Leuk Esterase: x / RBC: x / WBC x   Sq Epi: x / Non Sq Epi: x / Bacteria: x      CAPILLARY BLOOD GLUCOSE        LIVER FUNCTIONS - ( 17 Jan 2024 05:21 )  Alb: 2.3 g/dL / Pro: 8.2 g/dL / ALK PHOS: 104 U/L / ALT: 72 U/L / AST: 110 U/L / GGT: x               RADIOLOGY & ADDITIONAL STUDIES:

## 2024-01-17 NOTE — PROGRESS NOTE ADULT - ASSESSMENT
78 year old male with a PMHx of Crohn's, AFib/Flutter s/p DCCVs on amiodarone, remote ileocectomy and open appendectomy. Admitted (6/23) for SBO vs Crohns flare, s/p NGT decompression and s/p lap converted to open TRE, SBR x 3, left in discontinuity with abthera vac on (6/27), RTOR for ileocolic resection, small bowel anastomosis, and abdominal wall closure on (6/28), c/b fluid collection s/p IR aspiration of perihepatic fluid on (7/3), c/b wound dehiscence s/p RTOR exlap, washout, ileocolic resection with end ileostomy, blow hole colostomy, red rubber from ileostomy to small bowel anastomosis; vicryl bridging mesh on (7/5) transferred to SICU postoperatively for hemodynamic monitoring, with hospital course complicated by periods of severe ELVI and hyponatremia, which resolved but stepped back up for to SICU on (9/10) for acute AMS, intermittent hypoglycemia, AFib with RVR. Percutaneous Cholecystostomy placed on (9/11) for enlarged GB, PCT capped 10/5 and uncapped 10/25 for hyperbilirubinemia, Right brachial DVT, left basillic and cephalic superficial thrombus on duplex 11/2, CT 11/14 with ALDEN ground glass opacity of unclear etiology, completed empiric 7day cefepime course 11/22, now w resolved candida glabrata fungemia, ELVI improving. Having some intermittent bleeding on ileostomy.       - Continue TPN   - Continue wound care   - Cp percholecystostomy tube   - Team 5 will follow   - Plan discussed with Dr Peterson

## 2024-01-17 NOTE — PROGRESS NOTE ADULT - ASSESSMENT
78M w PMH Crohn's, AFib/Flutter s/p DCCVs on amiodarone, remote ileocectomy and open appendectomy. Admitted (6/23) for SBO vs Crohns flare, s/p NGT decompression and s/p lap converted to open TRE, SBR x 3, left in discontinuity with abthera vac on (6/27), RTOR for ileocolic resection, small bowel anastomosis, and abdominal wall closure on (6/28), c/b fluid collection s/p IR aspiration of perihepatic fluid on (7/3), c/b wound dehiscence s/p RTOR exlap, washout, ileocolic resection with end ileostomy, blow hole colostomy, red rubber from ileostomy to small bowel anastomosis; vicryl bridging mesh on (7/5) transferred to SICU postoperatively for hemodynamic monitoring, with hospital course complicated by periods of severe ELVI and hyponatremia, which resolved but stepped back up for to SICU on (9/10) for acute AMS, intermittent hypoglycemia, AFib with RVR. Percutaneous Cholecystostomy placed on (9/11) for enlarged GB, PCT capped 10/5 and uncapped 10/25 for hyperbilirubinemia, Right brachial DVT, left basillic and cephalic superficial thrombus on duplex 11/2, CT 11/14 with ALDEN ground glass opacity of unclear etiology, completed empiric 7day cefepime course 11/22, rising T-bili of unclear etilogy, now with resolved candida glabrata fungemia, ELVI improving.     NEURO: Hx of depression: cont Effexor (halved dose in setting of renal dysfunx). Anxiety: Lorazepam PRN. Holistic consult for anxiety and insomnia. Gabapentin for restless leg syndrome.   HEENT: Timolol eye gtt added on 1/10 for additional systemic BB support in setting of malabsorption  CV: Hx of Afib and recent Aflutter alternating with sinus tachycardia (HR 100s),: Cont Lopressor to 10 q6h, added timolol eye ggt for attempted systemic affect. Started therapeutic Lovenox on 1/10. s/p previous DCCV, off Amiodarone and Lipitor due to persistent transaminitis. BLE duplex (1/5) no DVT, TTE  (7/18) - PASP 64mmHg, EF 65-70%. holding Losartan & norvasc because not absorbing PO meds, HTN: hydralazine 10 q6  Standing. TTE (1/4) LVEF 75%, mild/mod AR, PASP 40, RVEF wnl. Given Increased fluid in TPN will watch I/O over the next few days.   PULM: Atelectasis/dyspnea improved clinically: cont 1 hr of BiPAP every 12hrs and nasal canula or room air. Encourage OOB and IS. CT from 11/14 with ALDEN ground glass opacity of unclear etiology. COVID negative. RVP negative. Completed 7d empiric cefepime 11/22 to cover for potential PNA.   GI/FEN: Low res, low fat, high protein diet. Cont Ensure max x1/day however pt likely not absorbing adequately due to proximal fistula. Folate, thiamine. PICC replaced 12/4, switched out PICC on 1/4. Continue TPN/lipids, 1L extra in TPN in order to make I/O even. 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 Discussed with Pharmacy and with patient. cont Octreotide & Cholestyramine 10/18. Transaminitis slowly improved, elevated T bili, s/p Perc Deb on 9/11, uncapped on 1/15; seen by Hepatology - MRCP 10/30 no obstruction, cont ursodiol, RUQ US 11/25 CBD 5mm, hepatic vessels patent. Cont Vit D weekly.   : Voids. ELVI improved: stopped famotidine given renal dysfunction. MARLO Duplex and RP US unremarkable, CK wnl.    ENDO: Hx hypothyroid: IV Synthroid,  Repeat thyroid studies WNL 1/3/24.   ID: Leukocytosis, possible PNA, start Cefepime (1/15--) // BCx 11/22 grew candida glabrata, likely CLABSI. s/p Caspo & completed Fluconazole course (12/1-12/9). Ophthalmology evaluated - normal ocular exam. Echo no vegetation. PICC replaced on 12/4. Cont Nystatin powder. // DC: caspofungin (11/24-12/1). empiric 7days cefepime (11/15-11/22), C. tertium, Lactobacillus from IR cx 7/3, and candida albicans, lactobacillus, vanc sensitive E faecium, vanc resistant E gallinarum, vanc resistant E casseliflavus, lactobacillus from OR cx 7/5. Completed course of abx with Imipenem (9/10-9/15). Imipenem (8/26--) imipenem (6/30-7/12, 7/23-7/30), Dapto (6/30-7/5 and 7/23-7/24). Empiric Dapto (8/23-24) and Cefepime (8/23-24).   PPX: SCDs, HOLD Lovenox therapeutic for Afib  HEME: Anemia - continue Epogen weekly. S/p Iron Sucrose 200 qd x 3 days for chronic anemia.   LINES: PIVs, New RUE PICC placed (1/5--) will plan to switch PICC once a month, secondary to history of fungemia // DC: LUE PICC (9/1-11/1 ), RUE PICC: (11/1-11/24), LUE PICC (12/4-1/5)  WOUNDS/DRAINS: Abdominal wound and fistula with wound manager in place dressing change daily. Had recurrent punctate bleeding at wound bed, s/p placed surgicel, attempt to stitch, electrocautery and epi soaked gauze. Cont epinephrine soaked gauze at bedside. RLQ Ostomy. IR perc deb tube uncapped 1/15-- tube study 1/16 // DC: L Clay drain.  PT: Ambulate as tolerated OOB to chair daily.  DISPO: SICU due to complicated hospital course. Goal to dc home in 2weeks: Spoke with case management and wound care on 1/10 to alert them of possible discharge home with services the week of 1/22 vs 1/29.   78M w PMH Crohn's, AFib/Flutter s/p DCCVs on amiodarone, remote ileocectomy and open appendectomy. Admitted (6/23) for SBO vs Crohns flare, s/p NGT decompression and s/p lap converted to open TRE, SBR x 3, left in discontinuity with abthera vac on (6/27), RTOR for ileocolic resection, small bowel anastomosis, and abdominal wall closure on (6/28), c/b fluid collection s/p IR aspiration of perihepatic fluid on (7/3), c/b wound dehiscence s/p RTOR exlap, washout, ileocolic resection with end ileostomy, blow hole colostomy, red rubber from ileostomy to small bowel anastomosis; vicryl bridging mesh on (7/5) transferred to SICU postoperatively for hemodynamic monitoring, with hospital course complicated by periods of severe ELVI and hyponatremia, which resolved but stepped back up for to SICU on (9/10) for acute AMS, intermittent hypoglycemia, AFib with RVR. Percutaneous Cholecystostomy placed on (9/11) for enlarged GB, PCT capped 10/5 and uncapped 10/25 for hyperbilirubinemia, Right brachial DVT, left basillic and cephalic superficial thrombus on duplex 11/2, CT 11/14 with ALDEN ground glass opacity of unclear etiology, completed empiric 7day cefepime course 11/22, rising T-bili of unclear etilogy, now with resolved candida glabrata fungemia, ELVI improving.     NEURO: Hx of depression: cont Effexor (halved dose in setting of renal dysfunx). Anxiety: Lorazepam PRN. Holistic consult for anxiety and insomnia. Gabapentin for restless leg syndrome.   HEENT: Timolol eye gtt added on 1/10 for additional systemic BB support in setting of malabsorption  CV: Hx of Afib and recent Aflutter alternating with sinus tachycardia (HR 100s),: Cont Lopressor to 10 q6h, added timolol eye ggt for attempted systemic affect. Started therapeutic Lovenox on 1/10, but held in setting of dark red output from ileostomy. s/p previous DCCV, off Amiodarone and Lipitor due to persistent transaminitis. BLE duplex (1/5) no DVT, TTE  (7/18) - PASP 64mmHg, EF 65-70%. holding Losartan & norvasc because not absorbing PO meds, HTN: hydralazine 10 q6  Standing. TTE (1/4) LVEF 75%, mild/mod AR, PASP 40, RVEF wnl. fluid in TPN to 3 liters to match I/O.    PULM: Atelectasis/dyspnea improved clinically: cont 1 hr of BiPAP every 12hrs and nasal canula or room air. Encourage OOB and IS. CT from 11/14 with ALDEN ground glass opacity of unclear etiology. COVID negative. RVP negative. Completed 7d empiric cefepime 11/22 to cover for potential PNA. leukocytosis, POCUS 2 days ago with ?consolidation suggesting possible PNA? - on another course of cefepime (1/15--)   GI/FEN: Low res, low fat, high protein diet. Cont Ensure max x1/day however pt likely not absorbing adequately due to proximal fistula. Folate, thiamine. PICC replaced 12/4, switched out PICC on 1/4. Continue TPN/lipids, 1L extra in TPN in order to make I/O even. 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 Discussed with Pharmacy and with patient. cont Octreotide & Cholestyramine 10/18. Transaminitis slowly improved, elevated T bili, s/p Perc Deb on 9/11, uncapped on 1/15; seen by Hepatology - MRCP 10/30 no obstruction, cont ursodiol, RUQ US 11/25 CBD 5mm, hepatic vessels patent. Cont Vit D weekly.   : Voids. ELVI improved: stopped famotidine given renal dysfunction. MARLO Duplex and RP US unremarkable, CK wnl.    ENDO: Hx hypothyroid: IV Synthroid,  Repeat thyroid studies WNL 1/3/24.   ID: Leukocytosis, possible PNA, start Cefepime (1/15--) // BCx 11/22 grew candida glabrata, likely CLABSI. s/p Caspo & completed Fluconazole course (12/1-12/9). Ophthalmology evaluated - normal ocular exam. Echo no vegetation. PICC replaced on 12/4. Cont Nystatin powder. // DC: caspofungin (11/24-12/1). empiric 7days cefepime (11/15-11/22), C. tertium, Lactobacillus from IR cx 7/3, and candida albicans, lactobacillus, vanc sensitive E faecium, vanc resistant E gallinarum, vanc resistant E casseliflavus, lactobacillus from OR cx 7/5. Completed course of abx with Imipenem (9/10-9/15). Imipenem (8/26--) imipenem (6/30-7/12, 7/23-7/30), Dapto (6/30-7/5 and 7/23-7/24). Empiric Dapto (8/23-24) and Cefepime (8/23-24).   PPX: SCDs, HOLD Lovenox therapeutic for Afib  HEME: Anemia - continue Epogen weekly. S/p Iron Sucrose 200 qd x 3 days for chronic anemia.   LINES: PIVs, New RUE PICC placed (1/5--) will plan to switch PICC once a month, secondary to history of fungemia // DC: LUE PICC (9/1-11/1 ), RUE PICC: (11/1-11/24), LUE PICC (12/4-1/5)  WOUNDS/DRAINS: Abdominal wound and fistula with wound manager in place dressing change daily. Had recurrent punctate bleeding at wound bed, s/p placed surgicel, attempt to stitch, electrocautery and epi soaked gauze. Cont epinephrine soaked gauze at bedside. RLQ Ostomy. IR perc deb tube uncapped 1/15-- tube study 1/16 // DC: L Clay drain.  PT: Ambulate as tolerated OOB to chair daily.  DISPO: SICU due to complicated hospital course. Goal to dc home in 2weeks: Spoke with case management and wound care on 1/10 to alert them of possible discharge home with services the week of 1/22 vs 1/29.   78M w PMH Crohn's, AFib/Flutter s/p DCCVs on amiodarone, remote ileocectomy and open appendectomy. Admitted (6/23) for SBO vs Crohns flare, s/p NGT decompression and s/p lap converted to open TRE, SBR x 3, left in discontinuity with abthera vac on (6/27), RTOR for ileocolic resection, small bowel anastomosis, and abdominal wall closure on (6/28), c/b fluid collection s/p IR aspiration of perihepatic fluid on (7/3), c/b wound dehiscence s/p RTOR exlap, washout, ileocolic resection with end ileostomy, blow hole colostomy, red rubber from ileostomy to small bowel anastomosis; vicryl bridging mesh on (7/5) transferred to SICU postoperatively for hemodynamic monitoring, with hospital course complicated by periods of severe ELVI and hyponatremia, which resolved but stepped back up for to SICU on (9/10) for acute AMS, intermittent hypoglycemia, AFib with RVR. Percutaneous Cholecystostomy placed on (9/11) for enlarged GB, PCT capped 10/5 and uncapped 10/25 for hyperbilirubinemia, Right brachial DVT, left basillic and cephalic superficial thrombus on duplex 11/2, CT 11/14 with ALDEN ground glass opacity of unclear etiology, completed empiric 7day cefepime course 11/22, rising T-bili of unclear etilogy, now with resolved candida glabrata fungemia, ELVI improving.     NEURO: Hx of depression: cont Effexor (halved dose in setting of renal dysfunx). Anxiety: Lorazepam PRN. Holistic consult for anxiety and insomnia. Gabapentin for restless leg syndrome.   HEENT: Timolol eye gtt added on 1/10 for additional systemic BB support in setting of malabsorption  CV: Hx of Afib and recent Aflutter alternating with sinus tachycardia (HR 100s),: Cont Lopressor to 10 q6h, added timolol eye ggt for attempted systemic affect. Started therapeutic Lovenox on 1/10, but held in setting of dark red output from ileostomy. s/p previous DCCV, off Amiodarone and Lipitor due to persistent transaminitis. BLE duplex (1/5) no DVT, TTE  (7/18) - PASP 64mmHg, EF 65-70%. holding Losartan & norvasc because not absorbing PO meds, HTN: hydralazine 10 q6  Standing. TTE (1/4) LVEF 75%, mild/mod AR, PASP 40, RVEF wnl. fluid in TPN to 3 liters to match I/O.    PULM: Atelectasis/dyspnea improved clinically: cont 1 hr of BiPAP every 12hrs and nasal canula or room air. Encourage OOB and IS. CT from 11/14 with ALDEN ground glass opacity of unclear etiology. COVID negative. RVP negative. Completed 7d empiric cefepime 11/22 to cover for potential PNA. leukocytosis, POCUS 2 days ago with ?consolidation suggesting possible PNA? - on another course of cefepime (1/15--)   GI/FEN: Low res, low fat, high protein diet. Cont Ensure max x1/day however pt likely not absorbing adequately due to proximal fistula. Folate, thiamine. PICC replaced 12/4, switched out PICC on 1/4. Continue TPN/lipids, 3 liters in TPN in order to make I/O even. 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 cont Octreotide & Cholestyramine 10/18. Transaminitis slowly improved, elevated T bili, s/p Perc Deb on 9/11, uncapped on 1/15, will recap perc deb today (11/17). been seen by Hepatology - MRCP 10/30 no obstruction, cont ursodiol, RUQ US 11/25 CBD 5mm, hepatic vessels patent. Cont Vit D weekly.   : Voids. ELVI improved: stopped famotidine given renal dysfunction. MARLO Duplex and RP US unremarkable, CK wnl.    ENDO: Hx hypothyroid: IV Synthroid,  Repeat thyroid studies WNL 1/3/24.   ID: Leukocytosis, possible PNA, Cefepime (1/15--) // BCx 11/22 grew candida glabrata, likely CLABSI. s/p Caspo & completed Fluconazole course (12/1-12/9). Ophthalmology evaluated - normal ocular exam. Echo no vegetation. PICC replaced on 12/4. Cont Nystatin powder. // DC: caspofungin (11/24-12/1). empiric 7days cefepime (11/15-11/22), C. tertium, Lactobacillus from IR cx 7/3, and candida albicans, lactobacillus, vanc sensitive E faecium, vanc resistant E gallinarum, vanc resistant E casseliflavus, lactobacillus from OR cx 7/5. Completed course of abx with Imipenem (9/10-9/15). Imipenem (8/26--) imipenem (6/30-7/12, 7/23-7/30), Dapto (6/30-7/5 and 7/23-7/24). Empiric Dapto (8/23-24) and Cefepime (8/23-24).   PPX: SCDs, held Lovenox therapeutic for Afib in setting of dark blood from ileostomy, but will restart PPX lovenox given hx of thrombosis.   HEME: Anemia - continue Epogen weekly. S/p Iron Sucrose 200 qd x 3 days for chronic anemia.   LINES: PIVs, RUE PICC placed (1/5--) will plan to switch PICC once a month in setting of fungemia history // DC: LUE PICC (9/1-11/1 ), RUE PICC: (11/1-11/24), LUE PICC (12/4-1/5)  WOUNDS/DRAINS: Abdominal wound and fistula with wound manager in place dressing change daily. Had recurrent punctate bleeding at wound bed, s/p placed surgicel, attempt to stitch, electrocautery and epi soaked gauze. Cont epinephrine soaked gauze at bedside as needed for wound bleeding. RLQ Ostomy. IR perc deb tube uncapped 1/15-1/17 // DC: L Clay drain.  PT: Ambulate as tolerated OOB to chair daily.  DISPO: SICU due to complicated hospital course. but will downgrade labs to every other day.    78M w PMH Crohn's, AFib/Flutter s/p DCCVs on amiodarone, remote ileocectomy and open appendectomy. Admitted (6/23) for SBO vs Crohns flare, s/p NGT decompression and s/p lap converted to open TRE, SBR x 3, left in discontinuity with abthera vac on (6/27), RTOR for ileocolic resection, small bowel anastomosis, and abdominal wall closure on (6/28), c/b fluid collection s/p IR aspiration of perihepatic fluid on (7/3), c/b wound dehiscence s/p RTOR exlap, washout, ileocolic resection with end ileostomy, blow hole colostomy, red rubber from ileostomy to small bowel anastomosis; vicryl bridging mesh on (7/5) transferred to SICU postoperatively for hemodynamic monitoring, with hospital course complicated by periods of severe ELVI and hyponatremia, which resolved but stepped back up for to SICU on (9/10) for acute AMS, intermittent hypoglycemia, AFib with RVR. Percutaneous Cholecystostomy placed on (9/11) for enlarged GB, PCT capped 10/5 and uncapped 10/25 for hyperbilirubinemia, Right brachial DVT, left basillic and cephalic superficial thrombus on duplex 11/2, CT 11/14 with ALDEN ground glass opacity of unclear etiology, completed empiric 7day cefepime course 11/22, rising T-bili of unclear etilogy, now with resolved candida glabrata fungemia, ELVI improving.     NEURO: Hx of depression: cont Effexor (halved dose in setting of renal dysfunx). Anxiety: Lorazepam PRN. Holistic consult for anxiety and insomnia. Gabapentin for restless leg syndrome.   HEENT: Timolol eye gtt added on 1/10 for additional systemic BB support in setting of malabsorption  CV: Hx of Afib and recent Aflutter alternating with sinus tachycardia (HR 100s),: Cont Lopressor to 10 q6h, added timolol eye ggt for attempted systemic affect. Started therapeutic Lovenox on 1/10, but held since 1/15 in setting of dark red output from ileostomy. s/p previous DCCV, off Amiodarone and Lipitor due to persistent transaminitis. BLE duplex (1/5) no DVT, TTE  (7/18) - PASP 64mmHg, EF 65-70%. holding Losartan & norvasc because not absorbing PO meds, HTN: hydralazine 10 q6  Standing. TTE (1/4) LVEF 75%, mild/mod AR, PASP 40, RVEF wnl. 3 liters of fluid in TPN to match I/O.    PULM: Atelectasis/dyspnea improved clinically: cont 1 hr of BiPAP every 12hrs and nasal canula or room air. Encourage OOB and IS. CT from 11/14 with ALDEN ground glass opacity of unclear etiology. COVID negative. RVP negative. Completed 7d empiric cefepime 11/22 to cover for potential PNA. leukocytosis, POCUS 2 days ago with ?consolidation suggesting possible PNA? - on another course of cefepime (1/15--)   GI/FEN: Low res, low fat, high protein diet. Cont Ensure max x1/day however pt likely not absorbing adequately due to proximal fistula. Folate, thiamine. PICC replaced 12/4, switched out PICC on 1/4. Continue TPN/lipids, 3 liters in TPN in order to make I/O even. 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 cont Octreotide & Cholestyramine 10/18. Transaminitis slowly improved, elevated T bili, s/p Perc Deb on 9/11, uncapped on 1/15, will recap perc deb today (11/17). been seen by Hepatology - MRCP 10/30 no obstruction, cont ursodiol, RUQ US 11/25 CBD 5mm, hepatic vessels patent. Cont Vit D weekly.   : Voids. ELVI improved: stopped famotidine given renal dysfunction. MARLO Duplex and RP US unremarkable, CK wnl.    ENDO: Hx hypothyroid: IV Synthroid,  Repeat thyroid studies WNL 1/3/24.   ID: Leukocytosis, possible PNA, Cefepime (1/15--) // BCx 11/22 grew candida glabrata, likely CLABSI. s/p Caspo & completed Fluconazole course (12/1-12/9). Ophthalmology evaluated - normal ocular exam. Echo no vegetation. PICC replaced on 12/4. Cont Nystatin powder. // DC: caspofungin (11/24-12/1). empiric 7days cefepime (11/15-11/22), C. tertium, Lactobacillus from IR cx 7/3, and candida albicans, lactobacillus, vanc sensitive E faecium, vanc resistant E gallinarum, vanc resistant E casseliflavus, lactobacillus from OR cx 7/5. Completed course of abx with Imipenem (9/10-9/15). Imipenem (8/26--) imipenem (6/30-7/12, 7/23-7/30), Dapto (6/30-7/5 and 7/23-7/24). Empiric Dapto (8/23-24) and Cefepime (8/23-24).   PPX: SCDs, held therapeutic Lovenox for Afib in setting of dark blood from ileostomy, but will restart PPX lovenox given hx of thrombosis.   HEME: Anemia - continue Epogen weekly. S/p Iron Sucrose 200 qd x 3 days for chronic anemia.   LINES: PIVs, RUE PICC placed (1/5--) will plan to switch PICC once a month in setting of fungemia history // DC: LUE PICC (9/1-11/1 ), RUE PICC: (11/1-11/24), LUE PICC (12/4-1/5)  WOUNDS/DRAINS: Abdominal wound and fistula with wound manager in place dressing change daily. Had recurrent punctate bleeding at wound bed, s/p placed surgicel, attempt to stitch, electrocautery and epi soaked gauze. Cont epinephrine soaked gauze at bedside as needed for wound bleeding. RLQ Ostomy. IR perc deb tube uncapped 1/15-1/17 // DC: L Clay drain.  PT: Ambulate as tolerated OOB to chair daily.  DISPO: SICU due to complicated hospital course. but will downgrade labs to every other day.

## 2024-01-17 NOTE — PROGRESS NOTE ADULT - SUBJECTIVE AND OBJECTIVE BOX
SUBJECTIVE: Pt seen and examined at bedside this am by surgery team. Patient is sitting in the chair comfortably. Tolerating diet, pain well controlled with current regimen. Patient denies fever, nausea, vomiting, chest pain, and shortness of breath.     MEDICATIONS  (STANDING):  cefepime  Injectable. 2000 milliGRAM(s) IV Push every 12 hours  chlorhexidine 2% Cloths 1 Application(s) Topical <User Schedule>  cholestyramine Powder (Sugar-Free) 4 Gram(s) Oral daily  epoetin matt-epbx (RETACRIT) Injectable 63333 Unit(s) SubCutaneous every 7 days  ergocalciferol 78189 Unit(s) Oral <User Schedule>  gabapentin 100 milliGRAM(s) Oral at bedtime  glucagon  Injectable 1 milliGRAM(s) IntraMuscular once  hydrALAZINE Injectable 10 milliGRAM(s) IV Push every 12 hours  levothyroxine Injectable 30 MICROGram(s) IV Push at bedtime  lipid, fat emulsion (Fish Oil and Plant Based) 20% Infusion 0.54 Gm/kG/Day (20.83 mL/Hr) IV Continuous <Continuous>  melatonin 5 milliGRAM(s) Oral at bedtime  metoprolol tartrate Injectable 10 milliGRAM(s) IV Push every 6 hours  Parenteral Nutrition - Adult 1 Each TPN Continuous <Continuous>  Parenteral Nutrition - Adult 1 Each TPN Continuous <Continuous>  timolol 0.5% Solution 1 Drop(s) Both EYES daily  ursodiol Suspension 300 milliGRAM(s) Oral every 8 hours  venlafaxine XR. 150 milliGRAM(s) Oral daily    MEDICATIONS  (PRN):  chlorhexidine 0.12% Liquid 15 milliLiter(s) Swish and Spit two times a day PRN oral hygeine  EPINEPHrine   1 mG/mL (1:1,000) Topical Solution 1 Application(s) Topical every 24 hours PRN for wound bleeding  LORazepam   Injectable 1 milliGRAM(s) IV Push <User Schedule> PRN Anxiety  ondansetron Injectable 4 milliGRAM(s) IV Push every 6 hours PRN Nausea and/or Vomiting      Vital Signs Last 24 Hrs  T(C): 36.3 (17 Jan 2024 08:15), Max: 36.6 (16 Jan 2024 17:25)  T(F): 97.3 (17 Jan 2024 08:15), Max: 97.8 (16 Jan 2024 17:25)  HR: 97 (17 Jan 2024 11:15) (88 - 106)  BP: 96/61 (17 Jan 2024 11:15) (96/61 - 152/77)  BP(mean): 73 (17 Jan 2024 11:15) (73 - 114)  RR: 16 (17 Jan 2024 11:15) (16 - 20)  SpO2: 95% (17 Jan 2024 11:15) (94% - 100%)    Parameters below as of 17 Jan 2024 11:15  Patient On (Oxygen Delivery Method): room air        Physical Exam  General: Patient is doing well and lying in bed comfortably  Constitutional: alert and awake, A&O x 4  Pulm: no respiratory distress  CV: Regular rate and rhythm, in and out of afib/flutter  Abd: soft, nontender, nondistended. No rebound, no guarding. Wound manager in place, no bleeding, no leaks. soft semi-formed output, brown in color; perc cony to gravity   Extremities: warm, well perfused      I&O's Detail    16 Jan 2024 07:01  -  17 Jan 2024 07:00  --------------------------------------------------------  IN:    Fat Emulsion (Fish Oil &amp; Plant Based) 20% Infusion: 380.4 mL    TPN (Total Parenteral Nutrition): 2354 mL  Total IN: 2734.4 mL    OUT:    Drain (mL): 1575 mL    Drain (mL): 1155 mL    Drain (mL): 50 mL    Voided (mL): 1150 mL  Total OUT: 3930 mL    Total NET: -1195.6 mL      17 Jan 2024 07:01  -  17 Jan 2024 12:54  --------------------------------------------------------  IN:    TPN (Total Parenteral Nutrition): 216 mL  Total IN: 216 mL    OUT:    Voided (mL): 215 mL  Total OUT: 215 mL    Total NET: 1 mL        LABS:                        8.5    11.57 )-----------( 164      ( 17 Jan 2024 05:21 )             26.8     01-17    136  |  101  |  44<H>  ----------------------------<  136<H>  4.4   |  28  |  1.40<H>    Ca    8.1<L>      17 Jan 2024 05:21  Phos  4.0     01-17  Mg     2.2     01-17    TPro  8.2  /  Alb  2.3<L>  /  TBili  5.5<H>  /  DBili  3.5<H>  /  AST  110<H>  /  ALT  72<H>  /  AlkPhos  104  01-17      Urinalysis Basic - ( 17 Jan 2024 05:21 )    Color: x / Appearance: x / SG: x / pH: x  Gluc: 136 mg/dL / Ketone: x  / Bili: x / Urobili: x   Blood: x / Protein: x / Nitrite: x   Leuk Esterase: x / RBC: x / WBC x   Sq Epi: x / Non Sq Epi: x / Bacteria: x

## 2024-01-17 NOTE — PROGRESS NOTE ADULT - NS ATTEND AMEND GEN_ALL_CORE FT
UC, AF, SBR, ileostomy with enteroatmospheric fistula, cholecystostomy, LLL pna  physical as above  WBC trend is down on the cefepime  TPN written  continue metoprolol  restart prophylactic heparin, no full AC yet  continue NIV as needed  recap cholecystostomy; bili again trending down

## 2024-01-17 NOTE — PROGRESS NOTE ADULT - ASSESSMENT
The patient is a 78 year old male with a PMHx of Crohn's, AFib/Flutter s/p DCCVs on amiodarone, remote ileocectomy and open appendectomy. Admitted (6/23) for SBO vs Crohns flare, s/p NGT decompression and s/p lap converted to open TRE, SBR x 3, left in discontinuity with abthera vac on (6/27), RTOR for ileocolic resection, small bowel anastomosis, and abdominal wall closure on (6/28), c/b fluid collection s/p IR aspiration of perihepatic fluid on (7/3), c/b wound dehiscence s/p RTOR exlap, washout, ileocolic resection with end ileostomy, blow hole colostomy, red rubber from ileostomy to small bowel anastomosis; vicryl bridging mesh on (7/5) transferred to SICU postoperatively for hemodynamic monitoring, with hospital course complicated by periods of severe ELVI and hyponatremia, which resolved but stepped back up for to SICU on (9/10) for acute AMS, intermittent hypoglycemia, AFib with RVR. Percutaneous Cholecystostomy placed on (9/11) for enlarged GB, PCT capped 10/5 and uncapped 10/25 for hyperbilirubinemia, Right brachial DVT, left basillic and cephalic superficial thrombus on duplex 11/2, CT 11/14 with ALDEN ground glass opacity of unclear etiology, completed empiric 7day cefepime course 11/22, rising T-bili of unclear etiology now w resolved candida glabrata fungemia, ELVI improving.     Reg Diet  Pain/nausea control prn  Cefepime per ID/SICU  BiPAP q4hprn  Wound manager changes daily with wound care  C/W octreotide, cholestyramine  C/W Epogen weekly  holding SQL/SCDs/OOBA/IS  Perc cony to gravity  Blood cultures with ngtd x 2d, will follow up bacterial and fungal growth  Rest of care per SICU

## 2024-01-17 NOTE — PROGRESS NOTE ADULT - SUBJECTIVE AND OBJECTIVE BOX
S: No new issues/events overnight, no new med c/o  feels better today, no c/o sob, no sig nausea    O: ICU Vital Signs Last 24 Hrs  T(F): 97.3 (01-17-24 @ 08:15), Max: 97.8 (01-16-24 @ 17:25)  HR: 104 (01-17-24 @ 08:15) (88 - 106)  BP: 152/77 (01-17-24 @ 06:40) (113/55 - 152/77)  BP(mean): 103 (01-17-24 @ 06:40) (77 - 114)  ABP: --  RR: 18 (01-17-24 @ 08:15) (16 - 29)  SpO2: 97% (01-17-24 @ 08:15) (94% - 100%)    PHYSICAL EXAM:   Neurological: AAOx3, CNII-XII intact,  strength 5/5 b/l  ENT: mucus membrane moist  Cardiovascular: RRR  Respiratory: CTA  Gastrointestinal: soft, NT, ND, BS+, ileostomy no erythema, no active bleeding at site, but has dark reddish bloody drainage in bag. fistula with intact wound manager yellowish grayish semi-formed output. no bleeding from fistula.   Extremities: warm, no dependent edema  Vascular: no cyanosis/erythema  Skin: no rashes  MSK: no joint swelling.     LABS:    01-17    136  |  101  |  44<H>  ----------------------------<  136<H>  4.4   |  28  |  1.40<H>    Ca    8.1<L>      17 Jan 2024 05:21  Phos  4.0     01-17  Mg     2.2     01-17    TPro  8.2  /  Alb  2.3<L>  /  TBili  5.5<H>  /  DBili  3.5<H>  /  AST  110<H>  /  ALT  72<H>  /  AlkPhos  104  01-17  LIVER FUNCTIONS - ( 17 Jan 2024 05:21 )  Alb: 2.3 g/dL / Pro: 8.2 g/dL / ALK PHOS: 104 U/L / ALT: 72 U/L / AST: 110 U/L / GGT: x                               8.5    11.57 )-----------( 164      ( 17 Jan 2024 05:21 )             26.8     Urinalysis Basic - ( 17 Jan 2024 05:21 )    Color: x / Appearance: x / SG: x / pH: x  Gluc: 136 mg/dL / Ketone: x  / Bili: x / Urobili: x   Blood: x / Protein: x / Nitrite: x   Leuk Esterase: x / RBC: x / WBC x   Sq Epi: x / Non Sq Epi: x / Bacteria: x    CAPILLARY BLOOD GLUCOSE        MEDICATIONS  (STANDING):  cefepime  Injectable. 2000 milliGRAM(s) IV Push every 12 hours  chlorhexidine 2% Cloths 1 Application(s) Topical <User Schedule>  cholestyramine Powder (Sugar-Free) 4 Gram(s) Oral daily  epoetin matt-epbx (RETACRIT) Injectable 08300 Unit(s) SubCutaneous every 7 days  ergocalciferol 25268 Unit(s) Oral <User Schedule>  gabapentin 100 milliGRAM(s) Oral at bedtime  glucagon  Injectable 1 milliGRAM(s) IntraMuscular once  hydrALAZINE Injectable 10 milliGRAM(s) IV Push every 12 hours  levothyroxine Injectable 30 MICROGram(s) IV Push at bedtime  lipid, fat emulsion (Fish Oil and Plant Based) 20% Infusion 0.54 Gm/kG/Day (20.83 mL/Hr) IV Continuous <Continuous>  lipid, fat emulsion (Fish Oil and Plant Based) 20% Infusion 0.54 Gm/kG/Day (20.83 mL/Hr) IV Continuous <Continuous>  melatonin 5 milliGRAM(s) Oral at bedtime  metoprolol tartrate Injectable 10 milliGRAM(s) IV Push every 6 hours  Parenteral Nutrition - Adult 1 Each TPN Continuous <Continuous>  Parenteral Nutrition - Adult 1 Each TPN Continuous <Continuous>  timolol 0.5% Solution 1 Drop(s) Both EYES daily  ursodiol Suspension 300 milliGRAM(s) Oral every 8 hours  venlafaxine XR. 150 milliGRAM(s) Oral daily    MEDICATIONS  (PRN):  chlorhexidine 0.12% Liquid 15 milliLiter(s) Swish and Spit two times a day PRN oral hygeine  EPINEPHrine   1 mG/mL (1:1,000) Topical Solution 1 Application(s) Topical every 24 hours PRN for wound bleeding  LORazepam   Injectable 1 milliGRAM(s) IV Push <User Schedule> PRN Anxiety  ondansetron Injectable 4 milliGRAM(s) IV Push every 6 hours PRN Nausea and/or Vomiting      Poole:	  [ x] None	[ ] Daily Poole Order Placed	   Indication:	  [ ] Strict I and O's    [ ] Obstruction     [ ] Incontinence + Stage 3 or 4 Decubitus  Central Line:  [ ] None	   [ x]  Medication / TPN Administration     [ ] No Peripheral IV

## 2024-01-18 NOTE — PROGRESS NOTE ADULT - NS ATTEND AMEND GEN_ALL_CORE FT
UC, AF, s/p SBR, ileocolic resection, ileostomy, cholecystostomy, enteroatmospheric fistula, LLL pna  physical as above  continue cefepime  cholecystostomy tube capped  continue TPN  metoprolol  SQ lovenox  rest as above

## 2024-01-18 NOTE — PROGRESS NOTE ADULT - ASSESSMENT
78M w PMH Crohn's, AFib/Flutter s/p DCCVs on amiodarone, remote ileocectomy and open appendectomy. Admitted (6/23) for SBO vs Crohns flare, s/p NGT decompression and s/p lap converted to open TRE, SBR x 3, left in discontinuity with abthera vac on (6/27), RTOR for ileocolic resection, small bowel anastomosis, and abdominal wall closure on (6/28), c/b fluid collection s/p IR aspiration of perihepatic fluid on (7/3), c/b wound dehiscence s/p RTOR exlap, washout, ileocolic resection with end ileostomy, blow hole colostomy, red rubber from ileostomy to small bowel anastomosis; vicryl bridging mesh on (7/5) transferred to SICU postoperatively for hemodynamic monitoring, with hospital course complicated by periods of severe ELVI and hyponatremia, which resolved but stepped back up for to SICU on (9/10) for acute AMS, intermittent hypoglycemia, AFib with RVR. Percutaneous Cholecystostomy placed on (9/11) for enlarged GB, PCT capped 10/5 and uncapped 10/25 for hyperbilirubinemia, Right brachial DVT, left basillic and cephalic superficial thrombus on duplex 11/2, CT 11/14 with ALDEN ground glass opacity of unclear etiology, completed empiric 7day cefepime course 11/22, rising T-bili of unclear etilogy, now with resolved candida glabrata fungemia, ELVI improving. leukocytosis, concern for recurrent PNA, another course of 7d cefepime (1/15-1/22)     NEURO: Hx of depression: cont Effexor (halved dose in setting of renal dysfunx). Anxiety: Lorazepam PRN. Holistic consult for anxiety and insomnia. Gabapentin for restless leg syndrome.   HEENT: Timolol eye gtt added on 1/10 for additional systemic BB support in setting of malabsorption  CV: Hx of Afib and recent Aflutter alternating with sinus tachycardia (HR 100s),: Cont Lopressor to 10 q6h, added timolol eye ggt for attempted systemic affect. Started therapeutic Lovenox on 1/10, but held since 1/15 in setting of dark red output from ileostomy. s/p previous DCCV, off Amiodarone and Lipitor due to persistent transaminitis. BLE duplex (1/5) no DVT, TTE  (7/18) - PASP 64mmHg, EF 65-70%. holding Losartan & norvasc because not absorbing PO meds, HTN: hydralazine 10 q6  Standing. TTE (1/4) LVEF 75%, mild/mod AR, PASP 40, RVEF wnl. 3 liters of fluid in TPN to match I/O.    PULM: Atelectasis/dyspnea improved clinically: cont 1 hr of BiPAP every 12hrs and nasal canula or room air. Encourage OOB and IS. CT from 11/14 with ALDEN ground glass opacity of unclear etiology. COVID negative. RVP negative. Completed 7d empiric cefepime 11/22 to cover for potential PNA. leukocytosis, POCUS 2 days ago with ?consolidation suggesting possible PNA? - on another course of cefepime (1/15-1/22)   GI/FEN: Low res, low fat, high protein diet. Cont Ensure max x1/day however pt likely not absorbing adequately due to proximal fistula. Folate, thiamine. PICC replaced 12/4, switched out PICC on 1/4. Continue TPN/lipids, 3 liters in TPN in order to make I/O even. 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 cont Octreotide & Cholestyramine 10/18. Transaminitis slowly improved, elevated T bili, s/p Perc Deb on 9/11, uncapped on 1/15, recap perc deb (11/17). been seen by Hepatology - MRCP 10/30 no obstruction, cont ursodiol, RUQ US 11/25 CBD 5mm, hepatic vessels patent. Cont Vit D weekly.   : Voids. ELVI improved: stopped famotidine given renal dysfunction. MARLO Duplex and RP US unremarkable, CK wnl.    ENDO: Hx hypothyroid: IV Synthroid,  Repeat thyroid studies WNL 1/3/24.   ID: Leukocytosis, possible PNA, 7d Cefepime (1/15-1/22) // BCx 11/22 grew candida glabrata, likely CLABSI. s/p Caspo & completed Fluconazole course (12/1-12/9). Ophthalmology evaluated - normal ocular exam. Echo no vegetation. PICC replaced on 12/4. Cont Nystatin powder. // DC: caspofungin (11/24-12/1). empiric 7days cefepime (11/15-11/22), C. tertium, Lactobacillus from IR cx 7/3, and candida albicans, lactobacillus, vanc sensitive E faecium, vanc resistant E gallinarum, vanc resistant E casseliflavus, lactobacillus from OR cx 7/5. Completed course of abx with Imipenem (9/10-9/15). Imipenem (8/26--) imipenem (6/30-7/12, 7/23-7/30), Dapto (6/30-7/5 and 7/23-7/24). Empiric Dapto (8/23-24) and Cefepime (8/23-24).   PPX: SCDs, held therapeutic Lovenox for Afib in setting of dark blood from ileostomy, but restart PPX lovenox 1/17 given hx of thrombosis.   HEME: Anemia - continue Epogen weekly. S/p Iron Sucrose 200 qd x 3 days for chronic anemia.   LINES: PIVs, RUE PICC placed (1/5--) will plan to switch PICC once a month in setting of fungemia history // DC: LUE PICC (9/1-11/1 ), RUE PICC: (11/1-11/24), LUE PICC (12/4-1/5)  WOUNDS/DRAINS: Abdominal wound and fistula with wound manager in place dressing change daily. Had recurrent punctate bleeding at wound bed, s/p placed surgicel, attempt to stitch, electrocautery and epi soaked gauze. Cont epinephrine soaked gauze at bedside as needed for wound bleeding. RLQ Ostomy. IR perc deb tube uncapped 1/15-1/17 // DC: L Clay drain.  PT: Ambulate as tolerated OOB to chair daily.  DISPO: SICU due to complicated hospital course. but will downgrade labs to every other day.

## 2024-01-18 NOTE — PROGRESS NOTE ADULT - SUBJECTIVE AND OBJECTIVE BOX
SUBJECTIVE: Pt seen and examined at bedside this am by surgery team. Tolerating diet.     MEDICATIONS  (STANDING):  cefepime   IVPB 2000 milliGRAM(s) IV Intermittent every 12 hours  chlorhexidine 2% Cloths 1 Application(s) Topical <User Schedule>  cholestyramine Powder (Sugar-Free) 4 Gram(s) Oral daily  epoetin matt-epbx (RETACRIT) Injectable 94935 Unit(s) SubCutaneous every 7 days  ergocalciferol 55056 Unit(s) Oral <User Schedule>  gabapentin 100 milliGRAM(s) Oral at bedtime  glucagon  Injectable 1 milliGRAM(s) IntraMuscular once  hydrALAZINE Injectable 10 milliGRAM(s) IV Push every 12 hours  levothyroxine Injectable 30 MICROGram(s) IV Push at bedtime  lipid, fat emulsion (Fish Oil and Plant Based) 20% Infusion 0.54 Gm/kG/Day (20.83 mL/Hr) IV Continuous <Continuous>  melatonin 5 milliGRAM(s) Oral at bedtime  metoprolol tartrate Injectable 10 milliGRAM(s) IV Push every 6 hours  Parenteral Nutrition - Adult 1 Each TPN Continuous <Continuous>  timolol 0.5% Solution 1 Drop(s) Both EYES daily  ursodiol Suspension 300 milliGRAM(s) Oral every 8 hours  venlafaxine XR. 150 milliGRAM(s) Oral daily    MEDICATIONS  (PRN):  chlorhexidine 0.12% Liquid 15 milliLiter(s) Swish and Spit two times a day PRN oral hygeine  EPINEPHrine   1 mG/mL (1:1,000) Topical Solution 1 Application(s) Topical every 24 hours PRN for wound bleeding  LORazepam   Injectable 1 milliGRAM(s) IV Push <User Schedule> PRN Anxiety  ondansetron Injectable 4 milliGRAM(s) IV Push every 6 hours PRN Nausea and/or Vomiting      Vital Signs Last 24 Hrs  T(C): 36.7 (18 Jan 2024 06:38), Max: 36.8 (17 Jan 2024 22:12)  T(F): 98 (18 Jan 2024 06:38), Max: 98.2 (17 Jan 2024 22:12)  HR: 98 (18 Jan 2024 07:00) (87 - 107)  BP: 117/55 (18 Jan 2024 06:10) (96/61 - 147/71)  BP(mean): 78 (18 Jan 2024 06:10) (73 - 110)  RR: 31 (18 Jan 2024 07:00) (16 - 34)  SpO2: 94% (18 Jan 2024 07:00) (94% - 100%)    Parameters below as of 18 Jan 2024 05:25  Patient On (Oxygen Delivery Method): BiPAP/CPAP    O2 Concentration (%): 40    Physical Exam  General: Patient is doing well and lying in bed comfortably  Constitutional: alert and oriented   Pulm: Nonlabored breathing, no respiratory distress  CV: Regular rate and rhythm, normal sinus rhythm  Abd: soft, nontender, nondistended. No rebound, no guarding. Wound manager in place without leaks, dark brown/green output. Perc cony capped Ileostomy with minimal output.   Extremities: warm, well perfused, no edema        I&O's Detail    17 Jan 2024 07:01  -  18 Jan 2024 07:00  --------------------------------------------------------  IN:    Fat Emulsion (Fish Oil &amp; Plant Based) 20% Infusion: 249.6 mL    IV PiggyBack: 50 mL    TPN (Total Parenteral Nutrition): 3212 mL  Total IN: 3511.6 mL    OUT:    Drain (mL): 250 mL    Drain (mL): 1950 mL    Voided (mL): 2105 mL  Total OUT: 4305 mL    Total NET: -793.4 mL          LABS:                        8.5    11.57 )-----------( 164      ( 17 Jan 2024 05:21 )             26.8     01-17    136  |  101  |  44<H>  ----------------------------<  136<H>  4.4   |  28  |  1.40<H>    Ca    8.1<L>      17 Jan 2024 05:21  Phos  4.0     01-17  Mg     2.2     01-17    TPro  8.2  /  Alb  2.3<L>  /  TBili  5.5<H>  /  DBili  3.5<H>  /  AST  110<H>  /  ALT  72<H>  /  AlkPhos  104  01-17      Urinalysis Basic - ( 17 Jan 2024 05:21 )    Color: x / Appearance: x / SG: x / pH: x  Gluc: 136 mg/dL / Ketone: x  / Bili: x / Urobili: x   Blood: x / Protein: x / Nitrite: x   Leuk Esterase: x / RBC: x / WBC x   Sq Epi: x / Non Sq Epi: x / Bacteria: x

## 2024-01-18 NOTE — PROGRESS NOTE ADULT - SUBJECTIVE AND OBJECTIVE BOX
SUBJECTIVE: Patient seen and evaluated.        MEDICATIONS  (STANDING):  cefepime   IVPB 2000 milliGRAM(s) IV Intermittent every 12 hours  chlorhexidine 2% Cloths 1 Application(s) Topical <User Schedule>  cholestyramine Powder (Sugar-Free) 4 Gram(s) Oral daily  enoxaparin Injectable 40 milliGRAM(s) SubCutaneous every 24 hours  epoetin matt-epbx (RETACRIT) Injectable 33360 Unit(s) SubCutaneous every 7 days  ergocalciferol 25817 Unit(s) Oral <User Schedule>  gabapentin 100 milliGRAM(s) Oral at bedtime  glucagon  Injectable 1 milliGRAM(s) IntraMuscular once  hydrALAZINE Injectable 10 milliGRAM(s) IV Push every 12 hours  levothyroxine Injectable 30 MICROGram(s) IV Push at bedtime  lipid, fat emulsion (Fish Oil and Plant Based) 20% Infusion 0.54 Gm/kG/Day (20.83 mL/Hr) IV Continuous <Continuous>  melatonin 5 milliGRAM(s) Oral at bedtime  metoprolol tartrate Injectable 10 milliGRAM(s) IV Push every 6 hours  Parenteral Nutrition - Adult 1 Each TPN Continuous <Continuous>  Parenteral Nutrition - Adult 1 Each TPN Continuous <Continuous>  timolol 0.5% Solution 1 Drop(s) Both EYES daily  ursodiol Suspension 300 milliGRAM(s) Oral every 8 hours  venlafaxine XR. 150 milliGRAM(s) Oral daily    MEDICATIONS  (PRN):  chlorhexidine 0.12% Liquid 15 milliLiter(s) Swish and Spit two times a day PRN oral hygeine  EPINEPHrine   1 mG/mL (1:1,000) Topical Solution 1 Application(s) Topical every 24 hours PRN for wound bleeding  LORazepam   Injectable 1 milliGRAM(s) IV Push <User Schedule> PRN Anxiety  ondansetron Injectable 4 milliGRAM(s) IV Push every 6 hours PRN Nausea and/or Vomiting      Vital Signs Last 24 Hrs  T(C): 36.4 (18 Jan 2024 17:00), Max: 36.8 (17 Jan 2024 22:12)  T(F): 97.5 (18 Jan 2024 17:00), Max: 98.2 (17 Jan 2024 22:12)  HR: 103 (18 Jan 2024 17:00) (82 - 107)  BP: 147/94 (18 Jan 2024 17:00) (117/55 - 152/84)  BP(mean): 113 (18 Jan 2024 17:00) (78 - 113)  RR: 20 (18 Jan 2024 17:00) (18 - 34)  SpO2: 96% (18 Jan 2024 17:00) (94% - 100%)    Parameters below as of 18 Jan 2024 17:00  Patient On (Oxygen Delivery Method): room air        Physical Exam:  General: Patient is doing well and lying in bed comfortably  Constitutional: alert and awake, A&O x 4  Pulm: no respiratory distress  CV: Regular rate and rhythm, in and out of afib/flutter  Abd: soft, nontender, nondistended. No rebound, no guarding. Wound manager in place, no bleeding, no leaks. soft semi-formed output, brown in color; perc cony to gravity   Extremities: warm, well perfusedpulses    I&O's Summary    17 Jan 2024 07:01  -  18 Jan 2024 07:00  --------------------------------------------------------  IN: 3511.6 mL / OUT: 4305 mL / NET: -793.4 mL    18 Jan 2024 07:01  -  18 Jan 2024 17:42  --------------------------------------------------------  IN: 470 mL / OUT: 1525 mL / NET: -1055 mL        LABS:                        8.5    11.57 )-----------( 164      ( 17 Jan 2024 05:21 )             26.8     01-17    136  |  101  |  44<H>  ----------------------------<  136<H>  4.4   |  28  |  1.40<H>    Ca    8.1<L>      17 Jan 2024 05:21  Phos  4.0     01-17  Mg     2.2     01-17    TPro  8.2  /  Alb  2.3<L>  /  TBili  5.5<H>  /  DBili  3.5<H>  /  AST  110<H>  /  ALT  72<H>  /  AlkPhos  104  01-17      Urinalysis Basic - ( 17 Jan 2024 05:21 )    Color: x / Appearance: x / SG: x / pH: x  Gluc: 136 mg/dL / Ketone: x  / Bili: x / Urobili: x   Blood: x / Protein: x / Nitrite: x   Leuk Esterase: x / RBC: x / WBC x   Sq Epi: x / Non Sq Epi: x / Bacteria: x      CAPILLARY BLOOD GLUCOSE        LIVER FUNCTIONS - ( 17 Jan 2024 05:21 )  Alb: 2.3 g/dL / Pro: 8.2 g/dL / ALK PHOS: 104 U/L / ALT: 72 U/L / AST: 110 U/L / GGT: x             RADIOLOGY & ADDITIONAL STUDIES:

## 2024-01-18 NOTE — PROGRESS NOTE ADULT - SUBJECTIVE AND OBJECTIVE BOX
S: didnt get his melatonin until 3AM,   otherwise No new issues/events overnight, no new med c/o    O: ICU Vital Signs Last 24 Hrs  T(F): 97.8 (01-18-24 @ 10:00), Max: 98.2 (01-17-24 @ 22:12)  HR: 82 (01-18-24 @ 12:00) (82 - 107)  BP: 152/84 (01-18-24 @ 10:00) (117/55 - 152/84)  BP(mean): 111 (01-18-24 @ 10:00) (78 - 111)  ABP: --  RR: 19 (01-18-24 @ 12:00) (16 - 34)  SpO2: 100% (01-18-24 @ 12:00) (94% - 100%)    PHYSICAL EXAM:   Neurological: AAOx3, CNII-XII intact,  strength 5/5 b/l  ENT: mucus membrane moist  Cardiovascular: RRR  Respiratory: CTA  Gastrointestinal: soft, NT, ND, BS+, right ileostomy no sig bright red blood, left fistula with yellowish thick drainage. perc cony capped.   Extremities: warm, no dependent edema  Vascular: no cyanosis/erythema  Skin: no rashes  MSK: no joint swelling.     LABS:    01-17    136  |  101  |  44<H>  ----------------------------<  136<H>  4.4   |  28  |  1.40<H>    Ca    8.1<L>      17 Jan 2024 05:21  Phos  4.0     01-17  Mg     2.2     01-17    TPro  8.2  /  Alb  2.3<L>  /  TBili  5.5<H>  /  DBili  3.5<H>  /  AST  110<H>  /  ALT  72<H>  /  AlkPhos  104  01-17  LIVER FUNCTIONS - ( 17 Jan 2024 05:21 )  Alb: 2.3 g/dL / Pro: 8.2 g/dL / ALK PHOS: 104 U/L / ALT: 72 U/L / AST: 110 U/L / GGT: x                               8.5    11.57 )-----------( 164      ( 17 Jan 2024 05:21 )             26.8     Urinalysis Basic - ( 17 Jan 2024 05:21 )    Color: x / Appearance: x / SG: x / pH: x  Gluc: 136 mg/dL / Ketone: x  / Bili: x / Urobili: x   Blood: x / Protein: x / Nitrite: x   Leuk Esterase: x / RBC: x / WBC x   Sq Epi: x / Non Sq Epi: x / Bacteria: x    CAPILLARY BLOOD GLUCOSE        MEDICATIONS  (STANDING):  cefepime   IVPB 2000 milliGRAM(s) IV Intermittent every 12 hours  chlorhexidine 2% Cloths 1 Application(s) Topical <User Schedule>  cholestyramine Powder (Sugar-Free) 4 Gram(s) Oral daily  enoxaparin Injectable 40 milliGRAM(s) SubCutaneous every 24 hours  epoetin matt-epbx (RETACRIT) Injectable 12807 Unit(s) SubCutaneous every 7 days  ergocalciferol 44930 Unit(s) Oral <User Schedule>  gabapentin 100 milliGRAM(s) Oral at bedtime  glucagon  Injectable 1 milliGRAM(s) IntraMuscular once  hydrALAZINE Injectable 10 milliGRAM(s) IV Push every 12 hours  levothyroxine Injectable 30 MICROGram(s) IV Push at bedtime  lipid, fat emulsion (Fish Oil and Plant Based) 20% Infusion 0.54 Gm/kG/Day (20.83 mL/Hr) IV Continuous <Continuous>  melatonin 5 milliGRAM(s) Oral at bedtime  metoprolol tartrate Injectable 10 milliGRAM(s) IV Push every 6 hours  Parenteral Nutrition - Adult 1 Each TPN Continuous <Continuous>  Parenteral Nutrition - Adult 1 Each TPN Continuous <Continuous>  timolol 0.5% Solution 1 Drop(s) Both EYES daily  ursodiol Suspension 300 milliGRAM(s) Oral every 8 hours  venlafaxine XR. 150 milliGRAM(s) Oral daily    MEDICATIONS  (PRN):  chlorhexidine 0.12% Liquid 15 milliLiter(s) Swish and Spit two times a day PRN oral hygeine  EPINEPHrine   1 mG/mL (1:1,000) Topical Solution 1 Application(s) Topical every 24 hours PRN for wound bleeding  LORazepam   Injectable 1 milliGRAM(s) IV Push <User Schedule> PRN Anxiety  ondansetron Injectable 4 milliGRAM(s) IV Push every 6 hours PRN Nausea and/or Vomiting      Poole:	  [x ] None	[ ] Daily Poole Order Placed	   Indication:	  [ ] Strict I and O's    [ ] Obstruction     [ ] Incontinence + Stage 3 or 4 Decubitus  Central Line:  [ ] None	   [x ]  Medication / TPN Administration     [ ] No Peripheral IV        S: didnt get his melatonin until 3AM,   otherwise No new issues/events overnight, no new med c/o  SOB is better, no CP    O: ICU Vital Signs Last 24 Hrs  T(F): 97.8 (01-18-24 @ 10:00), Max: 98.2 (01-17-24 @ 22:12)  HR: 82 (01-18-24 @ 12:00) (82 - 107)  BP: 152/84 (01-18-24 @ 10:00) (117/55 - 152/84)  BP(mean): 111 (01-18-24 @ 10:00) (78 - 111)  ABP: --  RR: 19 (01-18-24 @ 12:00) (16 - 34)  SpO2: 100% (01-18-24 @ 12:00) (94% - 100%)    PHYSICAL EXAM:   Neurological: AAOx3, CNII-XII intact,  strength 5/5 b/l  ENT: mucus membrane moist  Cardiovascular: RRR  Respiratory: CTA  Gastrointestinal: soft, NT, ND, BS+, right ileostomy no sig bright red blood, left fistula with yellowish thick drainage. perc cony capped.   Extremities: warm, no dependent edema  Vascular: no cyanosis/erythema  Skin: no rashes  MSK: no joint swelling.     LABS:    01-17    136  |  101  |  44<H>  ----------------------------<  136<H>  4.4   |  28  |  1.40<H>    Ca    8.1<L>      17 Jan 2024 05:21  Phos  4.0     01-17  Mg     2.2     01-17    TPro  8.2  /  Alb  2.3<L>  /  TBili  5.5<H>  /  DBili  3.5<H>  /  AST  110<H>  /  ALT  72<H>  /  AlkPhos  104  01-17  LIVER FUNCTIONS - ( 17 Jan 2024 05:21 )  Alb: 2.3 g/dL / Pro: 8.2 g/dL / ALK PHOS: 104 U/L / ALT: 72 U/L / AST: 110 U/L / GGT: x                               8.5    11.57 )-----------( 164      ( 17 Jan 2024 05:21 )             26.8     Urinalysis Basic - ( 17 Jan 2024 05:21 )    Color: x / Appearance: x / SG: x / pH: x  Gluc: 136 mg/dL / Ketone: x  / Bili: x / Urobili: x   Blood: x / Protein: x / Nitrite: x   Leuk Esterase: x / RBC: x / WBC x   Sq Epi: x / Non Sq Epi: x / Bacteria: x    CAPILLARY BLOOD GLUCOSE        MEDICATIONS  (STANDING):  cefepime   IVPB 2000 milliGRAM(s) IV Intermittent every 12 hours  chlorhexidine 2% Cloths 1 Application(s) Topical <User Schedule>  cholestyramine Powder (Sugar-Free) 4 Gram(s) Oral daily  enoxaparin Injectable 40 milliGRAM(s) SubCutaneous every 24 hours  epoetin matt-epbx (RETACRIT) Injectable 29676 Unit(s) SubCutaneous every 7 days  ergocalciferol 23623 Unit(s) Oral <User Schedule>  gabapentin 100 milliGRAM(s) Oral at bedtime  glucagon  Injectable 1 milliGRAM(s) IntraMuscular once  hydrALAZINE Injectable 10 milliGRAM(s) IV Push every 12 hours  levothyroxine Injectable 30 MICROGram(s) IV Push at bedtime  lipid, fat emulsion (Fish Oil and Plant Based) 20% Infusion 0.54 Gm/kG/Day (20.83 mL/Hr) IV Continuous <Continuous>  melatonin 5 milliGRAM(s) Oral at bedtime  metoprolol tartrate Injectable 10 milliGRAM(s) IV Push every 6 hours  Parenteral Nutrition - Adult 1 Each TPN Continuous <Continuous>  Parenteral Nutrition - Adult 1 Each TPN Continuous <Continuous>  timolol 0.5% Solution 1 Drop(s) Both EYES daily  ursodiol Suspension 300 milliGRAM(s) Oral every 8 hours  venlafaxine XR. 150 milliGRAM(s) Oral daily    MEDICATIONS  (PRN):  chlorhexidine 0.12% Liquid 15 milliLiter(s) Swish and Spit two times a day PRN oral hygeine  EPINEPHrine   1 mG/mL (1:1,000) Topical Solution 1 Application(s) Topical every 24 hours PRN for wound bleeding  LORazepam   Injectable 1 milliGRAM(s) IV Push <User Schedule> PRN Anxiety  ondansetron Injectable 4 milliGRAM(s) IV Push every 6 hours PRN Nausea and/or Vomiting      Poole:	  [x ] None	[ ] Daily Poole Order Placed	   Indication:	  [ ] Strict I and O's    [ ] Obstruction     [ ] Incontinence + Stage 3 or 4 Decubitus  Central Line:  [ ] None	   [x ]  Medication / TPN Administration     [ ] No Peripheral IV

## 2024-01-18 NOTE — PROGRESS NOTE ADULT - ASSESSMENT
78 year old male with a PMHx of Crohn's, AFib/Flutter s/p DCCVs on amiodarone, remote ileocectomy and open appendectomy. Admitted (6/23) for SBO vs Crohns flare, s/p NGT decompression and s/p lap converted to open TRE, SBR x 3, left in discontinuity with abthera vac on (6/27), RTOR for ileocolic resection, small bowel anastomosis, and abdominal wall closure on (6/28), c/b fluid collection s/p IR aspiration of perihepatic fluid on (7/3), c/b wound dehiscence s/p RTOR exlap, washout, ileocolic resection with end ileostomy, blow hole colostomy, red rubber from ileostomy to small bowel anastomosis; vicryl bridging mesh on (7/5) transferred to SICU postoperatively for hemodynamic monitoring, with hospital course complicated by periods of severe ELVI and hyponatremia, which resolved but stepped back up for to SICU on (9/10) for acute AMS, intermittent hypoglycemia, AFib with RVR. Percutaneous Cholecystostomy placed on (9/11) for enlarged GB, PCT capped 10/5 and uncapped 10/25 for hyperbilirubinemia, Right brachial DVT, left basillic and cephalic superficial thrombus on duplex 11/2, CT 11/14 with ALDEN ground glass opacity of unclear etiology, completed empiric 7day cefepime course 11/22, now w resolved candida glabrata fungemia, ELVI improving. Having some intermittent bleeding on ileostomy.       - Continue TPN   - Continue wound care   - Gattex   - Team 5 will follow   - Plan discussed with Dr Peterson

## 2024-01-19 NOTE — CHART NOTE - NSCHARTNOTEFT_GEN_A_CORE
Admitting Diagnosis:   Patient is a 78y old  Male who presents with a chief complaint of Surgery (16 Jan 2024 12:49)      PAST MEDICAL & SURGICAL HISTORY:  Essential hypertension  Hypertension      Atrial fibrillation  s/p cardioversion 2010 and 2014  Pt. reports 4 DCCV  Now on Amiodarone 200mg PO bid and Eliquis 5mg PO bid  Last DCCV 4 yrs ago at [facility]      Crohn's disease  s/p partial resection of ileum      Hyperlipidemia      Hypothyroidism      History of depression  On Venlafaxine ER 150mg PO bid      Junctional rhythm      H/O knee surgery      History of cataract surgery          Current Nutrition Order: TPN via PICC- 3L bag running over 14hrs, providing 380g Dex, 120g AA, 50g SMOF lipids daily; provides 2272 kcals, 120g protein daily, GIR 4.56 mg/kg/min   PO- Regular diet + 2 LPS per day [provide 100 kcals, 15g protein each], + Ensure Max daily [150 kcals, 20g protein]    PO Intake: Good (%) [ X ]  Fair (50-75%) [   ] Poor (<25%) [   ]    GI Issues:   1/19/24: ileostomy output 25cc x 24hrs, abdominal wound/fistula output 1700cc x 24hrs, per cony output 0cc x 24hrs [capped 1/2, uncapped 1/15, recapped 1/17]  1/16/24: ileostomy output 60cc x 24hrs, abdominal wound/fistula output 1375cc x 24hrs, per cony output 1025cc x 24hrs [capped 1/2, uncapped 1/15]  1/11/24: ileostomy output 0cc x 24hrs, abdominal wound/fistula output 1750cc x 24hrs, per cony output 0cc x 24hrs [capped 1/2]  1/8/24: noted brownish output from [id number], total amount not documented; abdominal wound/fistula output 2285cc x 24hrs, per cony output 0cc x 24hrs [capped 1/2]  1/3/24:  ileostomy output 0cc x 24hrs, abdominal wound/fistula output 2550cc x 24hrs, per cony output 275cc x 24hrs  12/29: ileostomy output 50cc x 24hrs, abdominal wound/fistula output 910cc x 24hrs, per cony output 1225cc x 24hrs  12/26: ileostomy output 50cc x 24hrs, abdominal wound/fistula output 1175cc x 24hrs, per cony output 1050cc x 24hrs  12/21: ileostomy output 50cc x 24hrs, abdominal wound/fistula output 1100cc x 24hrs, per cony output 1050cc x 24hrs     12/19: ileostomy output 20cc x 24hrs, abdominal wound/fistula output 1275cc x 24hrs, per cony output 775cc x 24hrs    12/14: ileostomy output 15cc x 24hrs, abdominal wound/fistula output  1230cc x 24hrs, per cony output 1010cc x 24hrs    12/12: ileostomy output 75cc x 24hrs, abdominal wound/fistula output  600cc x 24hrs, per cony output 1125cc x 24hrs    12/5: ileostomy output 35cc x 24hrs, abdominal wound/fistula output  250cc x 24hrs, per cony output 745cc x 24hrs    12/1: ileostomy output 30cc x 24hrs, abdominal wound/fistula output  2400cc x 24hrs, per cony output 550cc x 24hrs   11/27: ileostomy output 50 cc x 24hrs, abdominal wound/fistula output 2175 cc x 24hrs, per cony output 530 cc x 24hrs   11/22: ileostomy output 25cc x 24hrs, abdominal wound/fistula output 2350 cc x 24hrs, per cony output 775cc x 24hrs     Pain: no pain/discomfort noted    Skin Integrity: abd wound and fistula with wound manager in place, RLQ ileostomy, perc cony drain [now capped]. Rudy score 20    I&Os:   01-18-24 @ 07:01  -  01-19-24 @ 07:00  --------------------------------------------------------  IN: 3118 mL / OUT: 3150 mL / NET: -32 mL    01-19-24 @ 07:01 - 01-19-24 @ 12:08  --------------------------------------------------------  IN: 480 mL / OUT: 350 mL / NET: 130 mL        Labs:   01-19    136  |  102  |  40<H>  ----------------------------<  86  4.3   |  29  |  1.27    Ca    8.0<L>      19 Jan 2024 05:57  Phos  3.1     01-19  Mg     2.1     01-19    TPro  8.3  /  Alb  2.1<L>  /  TBili  5.4<H>  /  DBili  3.5<H>  /  AST  113<H>  /  ALT  79<H>  /  AlkPhos  111  01-19    CAPILLARY BLOOD GLUCOSE          Medications:  MEDICATIONS  (STANDING):  cefepime   IVPB 2000 milliGRAM(s) IV Intermittent every 12 hours  chlorhexidine 2% Cloths 1 Application(s) Topical <User Schedule>  cholestyramine Powder (Sugar-Free) 4 Gram(s) Oral daily  enoxaparin Injectable 40 milliGRAM(s) SubCutaneous every 24 hours  epoetin matt-epbx (RETACRIT) Injectable 82038 Unit(s) SubCutaneous every 7 days  ergocalciferol 18753 Unit(s) Oral <User Schedule>  gabapentin 100 milliGRAM(s) Oral at bedtime  glucagon  Injectable 1 milliGRAM(s) IntraMuscular once  hydrALAZINE Injectable 10 milliGRAM(s) IV Push every 12 hours  levothyroxine Injectable 30 MICROGram(s) IV Push at bedtime  melatonin 5 milliGRAM(s) Oral at bedtime  metoprolol tartrate Injectable 10 milliGRAM(s) IV Push every 6 hours  Parenteral Nutrition - Adult 1 Each TPN Continuous <Continuous>  timolol 0.5% Solution 1 Drop(s) Both EYES daily  ursodiol Suspension 300 milliGRAM(s) Oral every 8 hours  venlafaxine XR. 150 milliGRAM(s) Oral daily    MEDICATIONS  (PRN):  chlorhexidine 0.12% Liquid 15 milliLiter(s) Swish and Spit two times a day PRN oral hygeine  EPINEPHrine   1 mG/mL (1:1,000) Topical Solution 1 Application(s) Topical every 24 hours PRN for wound bleeding  LORazepam   Injectable 1 milliGRAM(s) IV Push <User Schedule> PRN Anxiety  ondansetron Injectable 4 milliGRAM(s) IV Push every 6 hours PRN Nausea and/or Vomiting      Weight: 99.1kg [17 Jan 2024]   Daily 94kg [07 Jan 2024]  Daily 93.3kg [19 Dec 2023]  Daily 93.3kg [11 Dec 2023]  Daily 93.3kg [08 Dec 2023]  Daily 93.3kg [6 Dec 2023]  Daily 94.2kg [28 Nov 2023]  Daily 94.4kg [27 Nov 2023]  Daily 94.2kg [26 Nov 2023]  Daily 94.2kg [22 Nov 2023]  Daily 94.2kg [16 Nov 2023]  Daily 94.2 [15 Nov 2023]  Daily 94.2kg [13 Nov 2023]  Daily 95.8kg [09 Nov 2023]  Daily 94.2kg [07 Nov 2023]  Daily 85.2kg [03 Nov 2023]  Daily 85.2kg [31 Oct 2023]  Daily 85.2kg [24 Oct 2023]  Daily 85.2kg [20 Oct 2023]  Daily 81.9kg [18 Oct 2023]  Daily 85.2kg [16 Oct 2023]  Daily   95.2kg [12 Oct 2023]   Daily 87.7kg [11 Oct 2023]  Daily 87.4kg [09 Oct 2023]  Daily 86.4kg [07 Oct 2023]  Daily 82.5kg [06 Oct 2023]  Daily 82.6kg [4 Oct 2023]   Daily 83.5kg [03 Oct 2023]  Daily 84.5kg [2 Oct 2023]  Daily 87.2kg [1 Oct 2023]  Daily 88.3kg [29 Sep 2023]  Daily 89.9kg [28 Sep 2023]  Daily 87.7kg [24 Sep 2023]  Daily 90.4kg [21 Sep 2023]  Daily 91.4kg [20 Sep 2023]  Daily 86.7kg [18 Sep 2023]  Daily 88.1kg [16 Sep 2023]  Daily 88.9kg [15 Sep 2023]   Daily 89.1 [14 Sep 2023]  Daily 92.9kg [6 Sep 2023]  Daily 91.8kg [27 Aug 2023]  Daily 101kg [9 Aug 2023]       Weight Change:  weight previously trending down, then up- now appears to be stable x ~2 months, recent weight gain likely secondary to increase in fluid provision/edema. Recommend continue weekly/daily weights for trending & ensuring adequacy of nutrition provision    Estimated energy needs:   Ideal body weight (86.4kg) used for calculations as pt >100% IBW and BMI <30 per [facility] Standards of Care. Needs estimated for age and adjusted for current clinical status, increased needs for post-op & open abd wound healing    Calories: 4693-7090 kcals based on 30-40 kcals/kg  Protein: 129.6-172.8 g protein based on 1.5-2.0g protein/kg + additional depending on outputs  *Fluid needs per team    IBW: 86.2kg  % IBW: 115.0%    Subjective:   78 M w/ Crohn's, AFib/Flutter s/p DCCVs on amiodarone, remote ileocectomy and open appendectomy. Admitted (6/23) for SBO vs Crohns flare, s/p NGT decompression and s/p lap TRE converted to open TRE, SBR x 3, left in discontinuity with abthera vac on (6/27), RTOR for ileocolic resection, small bowel anastomosis, and abdominal wall closure on (6/28), c/b fluid collection s/p IR aspiration of perihepatic fluid on (7/3), c/b wound dehiscence s/p RTOR exlap, washout, ileocolic resection with end ileostomy, blow hole colostomy, red rubber from ileostomy to small bowel anastomosis; vicryl bridging mesh on (7/5) transferred to SICU postoperatively for hemodynamic monitoring, with hospital course complicated by periods of AMS most recently on 9/1 and associated with oliguria, severe ELVI and hyponatremia, which resolved but stepped back up for to SICU on 9/10 for acute AMS, intermittent hypoglycemia, AFib with RVR. Percutaneous Cholecystostomy placed on 9/11 for enlarged GB, PCT capped 10/5 and uncapped 10/25 for hyperbilirubinemia, Right brachial DVT, left basillic and cephalic superficial thrombus on duplex 11/2, CT 11/14 with ALDEN ground glass opacity of unclear etiology, completed empiric 7day cefepime course 11/22, rising T-bili of unclear etilogy, now w resolved candida glabrata fungemia, ELVI improving.     Chart reviewed. Pt seen with IDT today during AM rounds. Rx and labs reviewed. 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 Continues on 3L TPN to maintain I&Os. Per cony tube uncapped 1/15, recapped 1/17. Plan to start Gattex to improve absorption/decrease TPN as able. Need close monitoring of outputs, weights, wound healing to monitor absorption, prevent over/underfeeding. Micronutrients checked 12/22/23- zinc 120 <H> [decreased in TPN to 3x per week], selenium 75 <H> [maxed in TPN], copper 12 <L> [provided in TPN & zinc lowered], also calcitriol 6.1 <L>. Plan to repeat micronutrient & vit D labs 1/22 & monthly thereafter, adjust TPN as  indicated. Other pertinent labs: BUN 40 <H>, total bilirubin 5.4 <H> [some improvement noted], direct bili 3.5 <H>,  <H>, ALT 79 <H>, indirect bili 1.9 <H>. Meds- 50,000 units vitamin D weekly, synthroid [administer 30-60 minutes before food; separate at least 4hrs from calcium or iron-containing products or bile acid sequestrants], zofran prn, synthroid [administer 30-60 minutes before food; separate at least 4hrs from calcium or iron-containing products or bile acid sequestrants], EPO, ursodiol, cholestyramine. RDN will continue to monitor, reassess, and intervene as appropriate.     Previous Nutrition Diagnosis:  1. Increased Nutrient Needs R/T physiological demands for nutrient AEB post-op wound healing  Active [ X ]  Resolved [   ]    2. Altered GI function R/T extensive GI history, insufficient bowel, high output fistula AEB supplemental PN requirement to meet nutrition needs  Active [ X  ]  Resolved [   ]    If resolved, new PES:     Goal:  Pt will meet at least 75% of protein & energy needs via most appropriate route for nutrition     Recommendations:  1. c/w TPN via PICC as primary means to nutrition - 3L bag running over 14hrs (214ml/hr), providing 380g Dex, 120g AA, 50g SMOF lipids daily; provides 2272 kcals, 120g protein daily, GIR 4.56 mg/kg/min   - provides 26.3 kcals/kg, 20.7 non-protein kcals/kg, 1.4g protein/kg IBW  - monitor weights & wound healing, adjust substrates as indicated  - fluid & lytes per team  - MVI, thiamine, vit C in bag  - decrease zinc to 3x per week, copper & selenium in bag [recheck levels 1/22]  2. PO diet per team discretion  -  c/w Regular diet as medically feasible to allow for more menu options  - c/w 1 LPS BID [200 kcals, 30g protein] + 1 Ensure Max daily [150 kcals, 30g protein] as amenable  - consider NPO for bowel rest as indicated   - ongoing education prn  - close monitoring of weights, fistula outputs, urine outputs & consider UUN & fecal fat studies to monitor changes in PO absorption  3. Close monitoring of weights, outputs, labs once Gattex initiated  4. Weekly lipid panel while on TPN  - hold/decrease lipids if TG >400  - continue to trend LFTs  5. c/w Vit D supplementation and recheck levels 1/22  6. Recommend weekly weights for trending/ensuring adequacy of nutrition provision  7. Hydration per team  - risk for dehydration with high outputs, monitor  - additional fluids per team  - consider oral rehydration solution  8. Monitor BMP/Mg/Phos, POC BG while on TPN  - monitor & replenish lytes outside TPN bag prn  9. Continue close monitoring of clinical course & adjust recommendations prn    Education: ongoing diet education prn    Risk Level: High [ X  ] Moderate [   ] Low [   ]

## 2024-01-19 NOTE — PROGRESS NOTE ADULT - SUBJECTIVE AND OBJECTIVE BOX
INTERVAL/OVERNIGHT EVENTS: Had wheezing overnight that improved after coughing sputum and Duonebs x1.     SUBJECTIVE: This AM doing well. Did not sleep well overnight. No N/V or CP/SOB.     Neurologic Medications  gabapentin 100 milliGRAM(s) Oral at bedtime  LORazepam   Injectable 1 milliGRAM(s) IV Push <User Schedule> PRN Anxiety  melatonin 5 milliGRAM(s) Oral at bedtime  ondansetron Injectable 4 milliGRAM(s) IV Push every 6 hours PRN Nausea and/or Vomiting  venlafaxine XR. 150 milliGRAM(s) Oral daily    Cardiovascular Medications  hydrALAZINE Injectable 10 milliGRAM(s) IV Push every 12 hours  metoprolol tartrate Injectable 10 milliGRAM(s) IV Push every 6 hours    Gastrointestinal Medications  ergocalciferol 18798 Unit(s) Oral <User Schedule>  lipid, fat emulsion (Fish Oil and Plant Based) 20% Infusion 0.5319 Gm/kG/Day IV Continuous <Continuous>  Parenteral Nutrition - Adult 1 Each TPN Continuous <Continuous>  ursodiol Suspension 300 milliGRAM(s) Oral every 8 hours    Hematologic/Oncologic Medications  enoxaparin Injectable 40 milliGRAM(s) SubCutaneous every 24 hours  epoetin matt-epbx (RETACRIT) Injectable 83541 Unit(s) SubCutaneous every 7 days    Antimicrobial/Immunologic Medications  cefepime   IVPB 2000 milliGRAM(s) IV Intermittent every 12 hours    Endocrine/Metabolic Medications  cholestyramine Powder (Sugar-Free) 4 Gram(s) Oral daily  glucagon  Injectable 1 milliGRAM(s) IntraMuscular once  levothyroxine Injectable 30 MICROGram(s) IV Push at bedtime    Topical/Other Medications  chlorhexidine 0.12% Liquid 15 milliLiter(s) Swish and Spit two times a day PRN oral hygeine  chlorhexidine 2% Cloths 1 Application(s) Topical <User Schedule>  EPINEPHrine   1 mG/mL (1:1,000) Topical Solution 1 Application(s) Topical every 24 hours PRN for wound bleeding  timolol 0.5% Solution 1 Drop(s) Both EYES daily    MEDICATIONS  (PRN):  chlorhexidine 0.12% Liquid 15 milliLiter(s) Swish and Spit two times a day PRN oral hygeine  EPINEPHrine   1 mG/mL (1:1,000) Topical Solution 1 Application(s) Topical every 24 hours PRN for wound bleeding  LORazepam   Injectable 1 milliGRAM(s) IV Push <User Schedule> PRN Anxiety  ondansetron Injectable 4 milliGRAM(s) IV Push every 6 hours PRN Nausea and/or Vomiting    I&O's Detail    18 Jan 2024 07:01  -  19 Jan 2024 07:00  --------------------------------------------------------  IN:    Fat Emulsion (Fish Oil &amp; Plant Based) 20% Infusion: 208 mL    IV PiggyBack: 50 mL    Oral Fluid: 720 mL    TPN (Total Parenteral Nutrition): 2140 mL  Total IN: 3118 mL    OUT:    Drain (mL): 25 mL    Drain (mL): 1700 mL    Voided (mL): 1425 mL  Total OUT: 3150 mL    Total NET: -32 mL    19 Jan 2024 07:01  -  19 Jan 2024 12:43  --------------------------------------------------------  IN:    IV PiggyBack: 50 mL    TPN (Total Parenteral Nutrition): 430 mL  Total IN: 480 mL    OUT:    Drain (mL): 150 mL    Voided (mL): 200 mL  Total OUT: 350 mL    Total NET: 130 mL    Vital Signs Last 24 Hrs  T(C): 36 (19 Jan 2024 05:30), Max: 36.9 (19 Jan 2024 00:01)  T(F): 96.8 (19 Jan 2024 05:30), Max: 98.5 (19 Jan 2024 00:01)  HR: 94 (19 Jan 2024 10:00) (87 - 107)  BP: 121/65 (19 Jan 2024 10:00) (109/59 - 160/74)  BP(mean): 83 (19 Jan 2024 10:00) (82 - 113)  RR: 26 (19 Jan 2024 10:00) (16 - 35)  SpO2: 98% (19 Jan 2024 10:00) (95% - 100%)    Parameters below as of 19 Jan 2024 10:00  Patient On (Oxygen Delivery Method): BiPAP/CPAP    O2 Concentration (%): 40    Neurological: AAOx3, CNII-XII intact,  strength 5/5 b/l  ENT: mucus membrane moist  Cardiovascular: RRR  Respiratory: CTA  Gastrointestinal: soft, NT, ND, BS+, right ileostomy no sig bright red blood, left fistula with yellowish thick drainage. perc cony capped.   Extremities: warm, 1+ dependent edema  Vascular: no cyanosis/erythema  Skin: no rashes  MSK: no joint swelling.     LABS:                        8.6    11.41 )-----------( 179      ( 19 Jan 2024 05:57 )             27.7     01-19    136  |  102  |  40<H>  ----------------------------<  86  4.3   |  29  |  1.27    Ca    8.0<L>      19 Jan 2024 05:57  Phos  3.1     01-19  Mg     2.1     01-19    TPro  8.3  /  Alb  2.1<L>  /  TBili  5.4<H>  /  DBili  3.5<H>  /  AST  113<H>  /  ALT  79<H>  /  AlkPhos  111  01-19    Urinalysis Basic - ( 19 Jan 2024 05:57 )    Color: x / Appearance: x / SG: x / pH: x  Gluc: 86 mg/dL / Ketone: x  / Bili: x / Urobili: x   Blood: x / Protein: x / Nitrite: x   Leuk Esterase: x / RBC: x / WBC x   Sq Epi: x / Non Sq Epi: x / Bacteria: x    RADIOLOGY & ADDITIONAL STUDIES:    Culture - Blood (collected 01-15-24 @ 09:50)  Source: .Blood Blood  Preliminary Report (01-19-24 @ 11:01):    No growth at 4 days.    Culture - Blood (collected 01-15-24 @ 09:05)  Source: .Blood Blood  Preliminary Report (01-19-24 @ 11:01):    No growth at 4 days.

## 2024-01-19 NOTE — PROGRESS NOTE ADULT - SUBJECTIVE AND OBJECTIVE BOX
SUBJECTIVE: Pt seen and examined at bedside this am by surgery team. Some shortness of breath overnight that improved with albuterol.     MEDICATIONS  (STANDING):  cefepime   IVPB 2000 milliGRAM(s) IV Intermittent every 12 hours  chlorhexidine 2% Cloths 1 Application(s) Topical <User Schedule>  cholestyramine Powder (Sugar-Free) 4 Gram(s) Oral daily  enoxaparin Injectable 40 milliGRAM(s) SubCutaneous every 24 hours  epoetin matt-epbx (RETACRIT) Injectable 24565 Unit(s) SubCutaneous every 7 days  ergocalciferol 39630 Unit(s) Oral <User Schedule>  gabapentin 100 milliGRAM(s) Oral at bedtime  glucagon  Injectable 1 milliGRAM(s) IntraMuscular once  hydrALAZINE Injectable 10 milliGRAM(s) IV Push every 12 hours  levothyroxine Injectable 30 MICROGram(s) IV Push at bedtime  lipid, fat emulsion (Fish Oil and Plant Based) 20% Infusion 0.5319 Gm/kG/Day (20.83 mL/Hr) IV Continuous <Continuous>  melatonin 5 milliGRAM(s) Oral at bedtime  metoprolol tartrate Injectable 10 milliGRAM(s) IV Push every 6 hours  Parenteral Nutrition - Adult 1 Each TPN Continuous <Continuous>  Parenteral Nutrition - Adult 1 Each TPN Continuous <Continuous>  timolol 0.5% Solution 1 Drop(s) Both EYES daily  ursodiol Suspension 300 milliGRAM(s) Oral every 8 hours  venlafaxine XR. 150 milliGRAM(s) Oral daily    MEDICATIONS  (PRN):  chlorhexidine 0.12% Liquid 15 milliLiter(s) Swish and Spit two times a day PRN oral hygeine  EPINEPHrine   1 mG/mL (1:1,000) Topical Solution 1 Application(s) Topical every 24 hours PRN for wound bleeding  LORazepam   Injectable 1 milliGRAM(s) IV Push <User Schedule> PRN Anxiety  ondansetron Injectable 4 milliGRAM(s) IV Push every 6 hours PRN Nausea and/or Vomiting      Vital Signs Last 24 Hrs  T(C): 35.7 (19 Jan 2024 13:31), Max: 36.9 (19 Jan 2024 00:01)  T(F): 96.2 (19 Jan 2024 13:31), Max: 98.5 (19 Jan 2024 00:01)  HR: 94 (19 Jan 2024 10:00) (87 - 107)  BP: 121/65 (19 Jan 2024 10:00) (109/59 - 160/74)  BP(mean): 83 (19 Jan 2024 10:00) (82 - 113)  RR: 26 (19 Jan 2024 10:00) (16 - 35)  SpO2: 98% (19 Jan 2024 10:00) (95% - 100%)    Parameters below as of 19 Jan 2024 10:00  Patient On (Oxygen Delivery Method): BiPAP/CPAP    O2 Concentration (%): 40    Physical Exam  General: Patient is doing well and lying in bed comfortably  Constitutional: alert and oriented   Pulm: Nonlabored breathing, no respiratory distress  CV: Regular rate and rhythm, normal sinus rhythm  Abd: soft, nontender, nondistended. No rebound, no guarding. Wound manager in place without leaks, dark brown/green output. Perc cony capped Ileostomy with minimal output.   Extremities: warm, well perfused, no edema      I&O's Detail    18 Jan 2024 07:01  -  19 Jan 2024 07:00  --------------------------------------------------------  IN:    Fat Emulsion (Fish Oil &amp; Plant Based) 20% Infusion: 208 mL    IV PiggyBack: 50 mL    Oral Fluid: 720 mL    TPN (Total Parenteral Nutrition): 2140 mL  Total IN: 3118 mL    OUT:    Drain (mL): 25 mL    Drain (mL): 1700 mL    Voided (mL): 1425 mL  Total OUT: 3150 mL    Total NET: -32 mL      19 Jan 2024 07:01  -  19 Jan 2024 14:30  --------------------------------------------------------  IN:    IV PiggyBack: 50 mL    Oral Fluid: 400 mL    TPN (Total Parenteral Nutrition): 430 mL  Total IN: 880 mL    OUT:    Drain (mL): 550 mL    Voided (mL): 450 mL  Total OUT: 1000 mL    Total NET: -120 mL          LABS:                        8.6    11.41 )-----------( 179      ( 19 Jan 2024 05:57 )             27.7     01-19    136  |  102  |  40<H>  ----------------------------<  86  4.3   |  29  |  1.27    Ca    8.0<L>      19 Jan 2024 05:57  Phos  3.1     01-19  Mg     2.1     01-19    TPro  8.3  /  Alb  2.1<L>  /  TBili  5.4<H>  /  DBili  3.5<H>  /  AST  113<H>  /  ALT  79<H>  /  AlkPhos  111  01-19      Urinalysis Basic - ( 19 Jan 2024 05:57 )    Color: x / Appearance: x / SG: x / pH: x  Gluc: 86 mg/dL / Ketone: x  / Bili: x / Urobili: x   Blood: x / Protein: x / Nitrite: x   Leuk Esterase: x / RBC: x / WBC x   Sq Epi: x / Non Sq Epi: x / Bacteria: x        RADIOLOGY & ADDITIONAL STUDIES:

## 2024-01-19 NOTE — ADVANCED PRACTICE NURSE CONSULT - ASSESSMENT
Pt sleeping during assessment today    Abdomen: stable fistula with thin brown effluent. Small amt of bleeding to to denuded area, surgery in to apply silver nitrate and epi soaked gauze.   Ileostomy stable pinpoint opening, +thin brown effluent in pouch, no bleeding.     New Dustin's wound/fistula pouch applied over fistula site. Periwound protected with Cavilon barrier wipe then stoma rings applied to the periwound area, stoma powder over denuded area prior to application of Dustin's pouch. New Coloplast soft convex 1 piece appliance placed over ileostomy.

## 2024-01-19 NOTE — PROGRESS NOTE ADULT - ASSESSMENT
78M w PMH Crohn's, AFib/Flutter s/p DCCVs on amiodarone, remote ileocectomy and open appendectomy. Admitted (6/23) for SBO vs Crohns flare, s/p NGT decompression and s/p lap converted to open TRE, SBR x 3, left in discontinuity with abthera vac on (6/27), RTOR for ileocolic resection, small bowel anastomosis, and abdominal wall closure on (6/28), c/b fluid collection s/p IR aspiration of perihepatic fluid on (7/3), c/b wound dehiscence s/p RTOR exlap, washout, ileocolic resection with end ileostomy, blow hole colostomy, red rubber from ileostomy to small bowel anastomosis; vicryl bridging mesh on (7/5) transferred to SICU postoperatively for hemodynamic monitoring, with hospital course complicated by periods of severe ELVI and hyponatremia, which resolved but stepped back up for to SICU on (9/10) for acute AMS, intermittent hypoglycemia, AFib with RVR. Percutaneous Cholecystostomy placed on (9/11) for enlarged GB, PCT capped 10/5 and uncapped 10/25 for hyperbilirubinemia, Right brachial DVT, left basillic and cephalic superficial thrombus on duplex 11/2, CT 11/14 with ALDEN ground glass opacity of unclear etiology, completed empiric 7day cefepime course 11/22, rising T-bili of unclear etilogy, now with resolved candida glabrata fungemia, ELVI improving.     NEURO: Hx of depression: cont Effexor (halved dose in setting of renal dysfunx). Anxiety: Lorazepam PRN. Holistic consult for anxiety and insomnia. Gabapentin for restless leg syndrome.   HEENT: Timolol eye gtt added on 1/10 for additional systemic BB support in setting of malabsorption  CV: Hx of Afib and recent Aflutter alternating with sinus tachycardia (HR 100s),: Cont Lopressor to 10 q6h, added timolol eye ggt for attempted systemic affect. Started therapeutic Lovenox on 1/10, but held since 1/15 in setting of dark red output from ileostomy. s/p previous DCCV, off Amiodarone and Lipitor due to persistent transaminitis. BLE duplex (1/5) no DVT, TTE  (7/18) - PASP 64mmHg, EF 65-70%. holding Losartan & norvasc because not absorbing PO meds, HTN: hydralazine 10 q6  Standing. TTE (1/4) LVEF 75%, mild/mod AR, PASP 40, RVEF wnl. 3 liters of fluid in TPN to match I/O.    PULM: Atelectasis/dyspnea improved clinically: cont 1 hr of BiPAP every 12hrs and nasal canula or room air. Encourage OOB and IS. CT from 11/14 with ALDEN ground glass opacity of unclear etiology. COVID negative. RVP negative. Completed 7d empiric cefepime 11/22 to cover for potential PNA. leukocytosis, POCUS 2 days ago with ?consolidation suggesting possible PNA? - on another course of cefepime (1/15--)   GI/FEN: Low res, low fat, high protein diet. Cont Ensure max x1/day however pt likely not absorbing adequately due to proximal fistula. Folate, thiamine. PICC replaced 12/4, switched out PICC on 1/4. Continue TPN/lipids, 3 liters in TPN in order to make I/O even. 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 cont Octreotide & Cholestyramine 10/18. Transaminitis slowly improved, elevated T bili, s/p Perc Deb on 9/11, uncapped on 1/15, will recap perc deb today (11/17). been seen by Hepatology - MRCP 10/30 no obstruction, cont ursodiol, RUQ US 11/25 CBD 5mm, hepatic vessels patent. Cont Vit D weekly.   : Voids. ELVI improved: stopped famotidine given renal dysfunction. MARLO Duplex and RP US unremarkable, CK wnl.    ENDO: Hx hypothyroid: IV Synthroid,  Repeat thyroid studies WNL 1/3/24.   ID: Leukocytosis, possible PNA, Cefepime (1/15--) // BCx 11/22 grew candida glabrata, likely CLABSI. s/p Caspo & completed Fluconazole course (12/1-12/9). Ophthalmology evaluated - normal ocular exam. Echo no vegetation. PICC replaced on 12/4. Cont Nystatin powder. // DC: caspofungin (11/24-12/1). empiric 7days cefepime (11/15-11/22), C. tertium, Lactobacillus from IR cx 7/3, and candida albicans, lactobacillus, vanc sensitive E faecium, vanc resistant E gallinarum, vanc resistant E casseliflavus, lactobacillus from OR cx 7/5. Completed course of abx with Imipenem (9/10-9/15). Imipenem (8/26--) imipenem (6/30-7/12, 7/23-7/30), Dapto (6/30-7/5 and 7/23-7/24). Empiric Dapto (8/23-24) and Cefepime (8/23-24).   PPX: SCDs, held therapeutic Lovenox for Afib in setting of dark blood from ileostomy, but will restart PPX lovenox given hx of thrombosis.   HEME: Anemia - continue Epogen weekly. S/p Iron Sucrose 200 qd x 3 days for chronic anemia.   LINES: PIVs, RUE PICC placed (1/5--) will plan to switch PICC once a month in setting of fungemia history // DC: LUE PICC (9/1-11/1 ), RUE PICC: (11/1-11/24), LUE PICC (12/4-1/5)  WOUNDS/DRAINS: Abdominal wound and fistula with wound manager in place dressing change daily. Had recurrent punctate bleeding at wound bed, s/p placed surgicel, attempt to stitch, electrocautery and epi soaked gauze. Cont epinephrine soaked gauze at bedside as needed for wound bleeding. RLQ Ostomy. IR perc deb tube uncapped 1/15-1/17 // DC: L Clay drain.  PT: Ambulate as tolerated OOB to chair daily.  DISPO: SICU due to complicated hospital course. but will downgrade labs to every other day.    78M w PMH Crohn's, AFib/Flutter s/p DCCVs on amiodarone, remote ileocectomy and open appendectomy. Admitted (6/23) for SBO vs Crohns flare, s/p NGT decompression and s/p lap converted to open TRE, SBR x 3, left in discontinuity with abthera vac on (6/27), RTOR for ileocolic resection, small bowel anastomosis, and abdominal wall closure on (6/28), c/b fluid collection s/p IR aspiration of perihepatic fluid on (7/3), c/b wound dehiscence s/p RTOR exlap, washout, ileocolic resection with end ileostomy, blow hole colostomy, red rubber from ileostomy to small bowel anastomosis; vicryl bridging mesh on (7/5) transferred to SICU postoperatively for hemodynamic monitoring, with hospital course complicated by periods of severe ELVI and hyponatremia, which resolved but stepped back up for to SICU on (9/10) for acute AMS, intermittent hypoglycemia, AFib with RVR. Percutaneous Cholecystostomy placed on (9/11) for enlarged GB, PCT capped 10/5 and uncapped 10/25 for hyperbilirubinemia, Right brachial DVT, left basillic and cephalic superficial thrombus on duplex 11/2, CT 11/14 with ALDEN ground glass opacity of unclear etiology, completed empiric 7day cefepime course 11/22, rising T-bili of unclear etilogy, now with resolved candida glabrata fungemia, ELVI improving.     NEURO: Hx of depression: cont Effexor (halved dose in setting of renal dysfunx). Anxiety: Lorazepam PRN. Holistic consult for anxiety and insomnia. Gabapentin for restless leg syndrome.   HEENT: Timolol eye gtt added on 1/10 for additional systemic BB support in setting of malabsorption  CV: Hx of Afib and recent Aflutter alternating with sinus tachycardia (HR 100s),: Cont Lopressor to 10 q6h, added timolol eye ggt for attempted systemic affect. Started therapeutic Lovenox on 1/10, but held since 1/15 in setting of dark red output from ileostomy. s/p previous DCCV, off Amiodarone and Lipitor due to persistent transaminitis. BLE duplex (1/5) no DVT, TTE  (7/18) - PASP 64mmHg, EF 65-70%. holding Losartan & norvasc because not absorbing PO meds, HTN: hydralazine 10 q6  Standing. TTE (1/4) LVEF 75%, mild/mod AR, PASP 40, RVEF wnl. 3 liters of fluid in TPN to match I/O.    PULM: Atelectasis/dyspnea improved clinically: cont 1 hr of BiPAP every 12hrs and nasal canula or room air. Encourage OOB and IS. CT from 11/14 with ALDEN ground glass opacity of unclear etiology. COVID negative. RVP negative. Completed 7d empiric cefepime 11/22 to cover for potential PNA. leukocytosis, POCUS 2 days ago with ?consolidation suggesting possible PNA? - on another course of cefepime (1/15--)   GI/FEN: Low res, low fat, high protein diet. Cont Ensure max x1/day however pt likely not absorbing adequately due to proximal fistula. Folate, thiamine. PICC replaced 12/4, switched out PICC on 1/4. Continue TPN/lipids, 3 liters in TPN in order to make I/O even. 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 cont Octreotide & Cholestyramine 10/18. Transaminitis slowly improved, elevated T bili, s/p Perc Deb on 9/11, uncapped on 1/15, recap perc deb (11/17). Seen by Hepatology - MRCP 10/30 no obstruction, cont ursodiol, RUQ US 11/25 CBD 5mm, hepatic vessels patent. Cont Vit D weekly.   : Voids. ELVI improved: stopped famotidine given renal dysfunction. MARLO Duplex and RP US unremarkable, CK wnl.    ENDO: Hx hypothyroid: IV Synthroid,  Repeat thyroid studies WNL 1/3/24.   ID: Leukocytosis, possible PNA, Cefepime (1/15--) // BCx 11/22 grew candida glabrata, likely CLABSI. s/p Caspo & completed Fluconazole course (12/1-12/9). Ophthalmology evaluated - normal ocular exam. Echo no vegetation. PICC replaced on 12/4. Cont Nystatin powder. // DC: caspofungin (11/24-12/1). empiric 7days cefepime (11/15-11/22), C. tertium, Lactobacillus from IR cx 7/3, and candida albicans, lactobacillus, vanc sensitive E faecium, vanc resistant E gallinarum, vanc resistant E casseliflavus, lactobacillus from OR cx 7/5. Completed course of abx with Imipenem (9/10-9/15). Imipenem (8/26--) imipenem (6/30-7/12, 7/23-7/30), Dapto (6/30-7/5 and 7/23-7/24). Empiric Dapto (8/23-24) and Cefepime (8/23-24).   PPX: SCDs, held therapeutic Lovenox for Afib in setting of dark blood from ileostomy, but will restart PPX lovenox given hx of thrombosis.   HEME: Anemia - continue Epogen weekly. S/p Iron Sucrose 200 qd x 3 days for chronic anemia.   LINES: PIVs, RUE PICC placed (1/5--) will plan to switch PICC once a month in setting of fungemia history // DC: LUE PICC (9/1-11/1 ), RUE PICC: (11/1-11/24), LUE PICC (12/4-1/5)  WOUNDS/DRAINS: Abdominal wound and fistula with wound manager in place dressing change daily. Had recurrent punctate bleeding at wound bed, s/p placed surgicel, attempt to stitch, electrocautery and epi soaked gauze. Cont epinephrine soaked gauze at bedside as needed for wound bleeding. RLQ Ostomy. IR perc deb tube uncapped 1/15-1/17 // DC: L Clay drain.  PT: Ambulate as tolerated OOB to chair daily.  DISPO: SICU due to complicated hospital course. but will downgrade labs to every other day.

## 2024-01-20 NOTE — PROGRESS NOTE ADULT - ASSESSMENT
78 year old male with a PMHx of Crohn's, AFib/Flutter s/p DCCVs on amiodarone, remote ileocectomy and open appendectomy. Admitted (6/23) for SBO vs Crohns flare, s/p NGT decompression and s/p lap converted to open TRE, SBR x 3, left in discontinuity with abthera vac on (6/27), RTOR for ileocolic resection, small bowel anastomosis, and abdominal wall closure on (6/28), c/b fluid collection s/p IR aspiration of perihepatic fluid on (7/3), c/b wound dehiscence s/p RTOR exlap, washout, ileocolic resection with end ileostomy, blow hole colostomy, red rubber from ileostomy to small bowel anastomosis; vicryl bridging mesh on (7/5) transferred to SICU postoperatively for hemodynamic monitoring, with hospital course complicated by periods of severe ELVI and hyponatremia, which resolved but stepped back up for to SICU on (9/10) for acute AMS, intermittent hypoglycemia, AFib with RVR. Percutaneous Cholecystostomy placed on (9/11) for enlarged GB, PCT capped 10/5 and uncapped 10/25 for hyperbilirubinemia, Right brachial DVT, left basillic and cephalic superficial thrombus on duplex 11/2, CT 11/14 with ALDEN ground glass opacity of unclear etiology, completed empiric 7day cefepime course 11/22, now w resolved candida glabrata fungemia, ELVI improving. Having some intermittent bleeding on ileostomy.       - Continue TPN   - Continue wound care   - Appreciate excellent care by SICU team

## 2024-01-20 NOTE — PROGRESS NOTE ADULT - SUBJECTIVE AND OBJECTIVE BOX
Covering for Dr. Peterson    Feels well, Was ambulating before.  Eating well  AFVSS  I&O are balanced, most of output is from urine and ECF  Appliances on, no leaks    Labs stable    A/P: Enterocutaneous fistula, controlled.  Metabolically improved, though still appears weak  1. On TPN and oral intake  2. Awaiting Gattex  3. Continue ambulation  4. Wound care with appliances.

## 2024-01-20 NOTE — PROGRESS NOTE ADULT - ASSESSMENT

## 2024-01-20 NOTE — PROGRESS NOTE ADULT - SUBJECTIVE AND OBJECTIVE BOX
INTERVAL/OVERNIGHT EVENTS: NAEO.     SUBJECTIVE: This AM, doing well without complaints. No N/V or CP/SOB. No abd pain. Voids.     Neurologic Medications  gabapentin 100 milliGRAM(s) Oral at bedtime  LORazepam   Injectable 1 milliGRAM(s) IV Push <User Schedule> PRN Anxiety  melatonin 5 milliGRAM(s) Oral at bedtime  ondansetron Injectable 4 milliGRAM(s) IV Push every 6 hours PRN Nausea and/or Vomiting  venlafaxine XR. 150 milliGRAM(s) Oral daily    Cardiovascular Medications  hydrALAZINE Injectable 10 milliGRAM(s) IV Push every 12 hours  metoprolol tartrate Injectable 10 milliGRAM(s) IV Push every 6 hours    Gastrointestinal Medications  ergocalciferol 89162 Unit(s) Oral <User Schedule>  lipid, fat emulsion (Fish Oil and Plant Based) 20% Infusion 0.5319 Gm/kG/Day IV Continuous <Continuous>  Parenteral Nutrition - Adult 1 Each TPN Continuous <Continuous>  Parenteral Nutrition - Adult 1 Each TPN Continuous <Continuous>  ursodiol Suspension 300 milliGRAM(s) Oral every 8 hours    Hematologic/Oncologic Medications  enoxaparin Injectable 40 milliGRAM(s) SubCutaneous every 24 hours  epoetin matt-epbx (RETACRIT) Injectable 47642 Unit(s) SubCutaneous every 7 days    Antimicrobial/Immunologic Medications  cefepime   IVPB 2000 milliGRAM(s) IV Intermittent every 12 hours    Endocrine/Metabolic Medications  cholestyramine Powder (Sugar-Free) 4 Gram(s) Oral daily  glucagon  Injectable 1 milliGRAM(s) IntraMuscular once  levothyroxine Injectable 30 MICROGram(s) IV Push at bedtime    Topical/Other Medications  chlorhexidine 0.12% Liquid 15 milliLiter(s) Swish and Spit two times a day PRN oral hygeine  chlorhexidine 2% Cloths 1 Application(s) Topical <User Schedule>  EPINEPHrine   1 mG/mL (1:1,000) Topical Solution 1 Application(s) Topical every 24 hours PRN for wound bleeding  timolol 0.5% Solution 1 Drop(s) Both EYES daily      MEDICATIONS  (PRN):  chlorhexidine 0.12% Liquid 15 milliLiter(s) Swish and Spit two times a day PRN oral hygeine  EPINEPHrine   1 mG/mL (1:1,000) Topical Solution 1 Application(s) Topical every 24 hours PRN for wound bleeding  LORazepam   Injectable 1 milliGRAM(s) IV Push <User Schedule> PRN Anxiety  ondansetron Injectable 4 milliGRAM(s) IV Push every 6 hours PRN Nausea and/or Vomiting      I&O's Detail    19 Jan 2024 07:01  -  20 Jan 2024 07:00  --------------------------------------------------------  IN:    Fat Emulsion (Fish Oil &amp; Plant Based) 20% Infusion: 291.2 mL    IV PiggyBack: 100 mL    Oral Fluid: 1020 mL    TPN (Total Parenteral Nutrition): 3428 mL  Total IN: 4839.2 mL    OUT:    Drain (mL): 20 mL    Drain (mL): 1750 mL    Voided (mL): 1435 mL  Total OUT: 3205 mL    Total NET: 1634.2 mL      20 Jan 2024 07:01  -  20 Jan 2024 11:08  --------------------------------------------------------  IN:    Oral Fluid: 100 mL    TPN (Total Parenteral Nutrition): 214 mL  Total IN: 314 mL    OUT:    Drain (mL): 100 mL    Voided (mL): 375 mL  Total OUT: 475 mL    Total NET: -161 mL    Vital Signs Last 24 Hrs  T(C): 36.6 (20 Jan 2024 09:20), Max: 36.6 (20 Jan 2024 09:20)  T(F): 97.8 (20 Jan 2024 09:20), Max: 97.8 (20 Jan 2024 09:20)  HR: 92 (20 Jan 2024 09:45) (87 - 106)  BP: 137/69 (20 Jan 2024 09:45) (128/64 - 162/91)  BP(mean): 96 (20 Jan 2024 09:45) (87 - 117)  RR: 19 (20 Jan 2024 09:45) (19 - 28)  SpO2: 98% (20 Jan 2024 09:45) (91% - 99%)    Parameters below as of 20 Jan 2024 09:45  Patient On (Oxygen Delivery Method): room air    Neurological: AAOx3, CNII-XII intact,  strength 5/5 b/l  ENT: mucus membrane moist  Cardiovascular: RRR  Respiratory: CTA  Gastrointestinal: soft, NT, ND, BS+, right ileostomy no sig bright red blood, left fistula with yellowish thick drainage. perc cony capped.   Extremities: warm, 1+ dependent edema  Vascular: no cyanosis/erythema  Skin: no rashes  MSK: no joint swelling.     LABS:                        8.6    11.41 )-----------( 179      ( 19 Jan 2024 05:57 )             27.7     01-19    136  |  102  |  40<H>  ----------------------------<  86  4.3   |  29  |  1.27    Ca    8.0<L>      19 Jan 2024 05:57  Phos  3.1     01-19  Mg     2.1     01-19    TPro  8.3  /  Alb  2.1<L>  /  TBili  5.4<H>  /  DBili  3.5<H>  /  AST  113<H>  /  ALT  79<H>  /  AlkPhos  111  01-19    Urinalysis Basic - ( 19 Jan 2024 05:57 )    Color: x / Appearance: x / SG: x / pH: x  Gluc: 86 mg/dL / Ketone: x  / Bili: x / Urobili: x   Blood: x / Protein: x / Nitrite: x   Leuk Esterase: x / RBC: x / WBC x   Sq Epi: x / Non Sq Epi: x / Bacteria: x

## 2024-01-20 NOTE — PROGRESS NOTE ADULT - SUBJECTIVE AND OBJECTIVE BOX
SUBJECTIVE:  Pt seen and evaluated at bedside this AM. Pt seen in SICU wearing scarf and wool hat, of note room is particularly cold. Pt has consistent, productive cough however afebrile and saturating 98% on RA. Denies nausea/vomiting.     MEDICATIONS  (STANDING):  cefepime   IVPB 2000 milliGRAM(s) IV Intermittent every 12 hours  chlorhexidine 2% Cloths 1 Application(s) Topical <User Schedule>  cholestyramine Powder (Sugar-Free) 4 Gram(s) Oral daily  enoxaparin Injectable 40 milliGRAM(s) SubCutaneous every 24 hours  epoetin matt-epbx (RETACRIT) Injectable 45031 Unit(s) SubCutaneous every 7 days  ergocalciferol 52102 Unit(s) Oral <User Schedule>  gabapentin 100 milliGRAM(s) Oral at bedtime  glucagon  Injectable 1 milliGRAM(s) IntraMuscular once  hydrALAZINE Injectable 10 milliGRAM(s) IV Push every 12 hours  levothyroxine Injectable 30 MICROGram(s) IV Push at bedtime  lipid, fat emulsion (Fish Oil and Plant Based) 20% Infusion 0.5319 Gm/kG/Day (20.83 mL/Hr) IV Continuous <Continuous>  melatonin 5 milliGRAM(s) Oral at bedtime  metoprolol tartrate Injectable 10 milliGRAM(s) IV Push every 6 hours  Parenteral Nutrition - Adult 1 Each TPN Continuous <Continuous>  Parenteral Nutrition - Adult 1 Each TPN Continuous <Continuous>  timolol 0.5% Solution 1 Drop(s) Both EYES daily  ursodiol Suspension 300 milliGRAM(s) Oral every 8 hours  venlafaxine XR. 150 milliGRAM(s) Oral daily    MEDICATIONS  (PRN):  chlorhexidine 0.12% Liquid 15 milliLiter(s) Swish and Spit two times a day PRN oral hygeine  EPINEPHrine   1 mG/mL (1:1,000) Topical Solution 1 Application(s) Topical every 24 hours PRN for wound bleeding  LORazepam   Injectable 1 milliGRAM(s) IV Push <User Schedule> PRN Anxiety  ondansetron Injectable 4 milliGRAM(s) IV Push every 6 hours PRN Nausea and/or Vomiting      Vital Signs Last 24 Hrs  T(C): 36.6 (20 Jan 2024 13:17), Max: 36.6 (20 Jan 2024 09:20)  T(F): 97.8 (20 Jan 2024 13:17), Max: 97.8 (20 Jan 2024 09:20)  HR: 90 (20 Jan 2024 13:20) (87 - 106)  BP: 140/87 (20 Jan 2024 13:20) (128/64 - 160/77)  BP(mean): 108 (20 Jan 2024 13:20) (87 - 110)  RR: 23 (20 Jan 2024 13:20) (19 - 28)  SpO2: 97% (20 Jan 2024 13:20) (91% - 99%)    Parameters below as of 20 Jan 2024 13:20  Patient On (Oxygen Delivery Method): room air        Physical Exam:  General: NAD, resting comfortably in bed  Pulmonary: Nonlabored breathing, no respiratory distress  Cardiovascular: NSR  Abdominal: soft, NT/ND  Extremities: WWP, normal strength  Neuro: A/O x 3, CNs II-XII grossly intact, no focal deficits    I&O's Summary    19 Jan 2024 07:01  -  20 Jan 2024 07:00  --------------------------------------------------------  IN: 4839.2 mL / OUT: 3205 mL / NET: 1634.2 mL    20 Jan 2024 07:01  -  20 Jan 2024 15:52  --------------------------------------------------------  IN: 844 mL / OUT: 1175 mL / NET: -331 mL        LABS:                        8.6    11.41 )-----------( 179      ( 19 Jan 2024 05:57 )             27.7     01-19    136  |  102  |  40<H>  ----------------------------<  86  4.3   |  29  |  1.27    Ca    8.0<L>      19 Jan 2024 05:57  Phos  3.1     01-19  Mg     2.1     01-19    TPro  8.3  /  Alb  2.1<L>  /  TBili  5.4<H>  /  DBili  3.5<H>  /  AST  113<H>  /  ALT  79<H>  /  AlkPhos  111  01-19      Urinalysis Basic - ( 19 Jan 2024 05:57 )    Color: x / Appearance: x / SG: x / pH: x  Gluc: 86 mg/dL / Ketone: x  / Bili: x / Urobili: x   Blood: x / Protein: x / Nitrite: x   Leuk Esterase: x / RBC: x / WBC x   Sq Epi: x / Non Sq Epi: x / Bacteria: x      CAPILLARY BLOOD GLUCOSE        LIVER FUNCTIONS - ( 19 Jan 2024 05:57 )  Alb: 2.1 g/dL / Pro: 8.3 g/dL / ALK PHOS: 111 U/L / ALT: 79 U/L / AST: 113 U/L / GGT: x             RADIOLOGY & ADDITIONAL STUDIES:

## 2024-01-21 NOTE — CONSULT NOTE ADULT - ATTENDING COMMENTS
78M w HTN, AFib/Flutter s/p multiple DCCV (on Amio), Gout, Crohn's, HLD, R IHR, ileocecectomy, open appy admitted (6/23) for SBO vs Crohns flare, s/p NGT decompression and s/p lap TRE converted to open TRE, SBR x 3, left in discontinuity with abthera vac on (6/27), RTOR for ileocolic resection, small bowel anastomosis, and abdominal wall closure on (6/28), c/b fluid collection s/p IR aspiration of perihepatic fluid on (7/3), c/b wound dehiscence s/p RTOR exlap, washout, ileocolic resection with end ileostomy, blow hole colostomy, red rubber from ileostomy to small bowel anastomosis; vicryl bridging mesh on (7/5), course complicated by periods of AMS most recently on 9/1 and associated with oliguria, severe ELVI and hyponatremia now with AMS again.   1. AMS  2. Afib w RVR  3. ECF  4. ELVI  5. Chron's  - iv fluids  - repeat infectious work up  - CT scan abdomen
oligo-anuric ELVI  copious outputs from his various drains and some downtrend in BP  needs aggressive IVFs-   straight cath to assure no urine in bladder- very unusual to make no urine  no history to suggest upper track obstruction, embolization papillary necrosis or infarct  prerenal, ATN AIN  needs ongoing + hourly, plus boluses to see if we can generate some UO  reviewed with surg team
TyroneRuss antonio [id number]  This is a 73 y/o male well known to the ICU who was transferred to the SICU in late august with concern for sepsis and stepped down to tele for continuation of care, his cre increased to >3 and was transferred back to the SICU for acute renal failure due to high GI output.  NAD, A/O x3, wound vac with serobilious output, VSS, Na 122, cre 3.08, Tbili 4.7  -acute renal failure  -hyponatremia  -Crohn's disease  -aflutter  Please see the residents not above for the rest of the details as discussed in evening rounds.
Elevated liver tests in patient with multiple comorbid conditions and complex and complicated course of hospitalization, also on parenteral nutrition   Likely multifactorial in nature due to cholestasis of illness, TPN and medications   Ursodiol 300 mg TID if PO medications can be taken   CT scan was reviewed. No obvious cause for bouts of rigor   Check Fungitell and fungal blood culture
new recurrent encephalopathy of unclear etiology.  initial clinical exam suggesting hepatic encephalopathy but ammonia wnl and asterixis not persistent on exam.     agree with r/o of new toxic/infectious process.    24 hour vEEG  if workup unrevealing might consider adding lactulose to promote bowel movements and remove hepatic toxins
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 No pauses noted   - TTE 1/04/2024:  Hyperdynamic left ventricular systolic function, ,moderate symmetric left ventricular hypertrophy, Grade II DD, normal RV function, normal atria, PASP 40mmHg, no pericardial effusion     #Vasovagal syncope leading to sinus arrest. Now resolved   #Afib/ Aflutter  - Patient with known pAfib/Flutter, chronically on Amio, now rate controlled on Lopressor/Timolol with Sinus arrest yesterday  - Patient reports that he was straining to urinate after which he recalls waking up to ''being resuscitated''  - Review of Tele shows AFib that progressively slowed with a long pause with ventricular escape. Pause lasted about 15 seconds after which patient reverted back into Afib with rates in the 100-110's  - At this time suspect arrest from profound vasovagal episode in the setting of straining. No further pause noted on tele since   - Given vasovagal event correlating to the event and no further pauses, OK to resume metoprolol 10mg q6h ( with holding parameters- hold for SBP<90 AND HR <60) as wel as  timolol 0.5%   - EP following and agreeable with the plan  - Will continue to monitor Standing HR on that regimen and consider other agents as needed  - Please add on pro BNP to am labs on 1/23 am  - CHADVASC of at least 3, however patient unable to tolerate AC at this time. Will continue to defer for now  - Please keep K>4. Mg>2  - Cardiology will follow with you, please call with any questions
I evaluated the patient with the Oncology fellow, Dr Berrios.  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  He has undergone multiple surgeries this admission and has had medical complications including ELVI, fungemia.  As part of the evaluation for his ELVI, a paraprotein evaluation was performed.  SPEP showed a 1.7 g/dL M-spike, SIFE showing IgG lambda.  Serum free kappa - 17, free lambda 17, for ratio of 1.  Not surprising considering the length of his hospitalization and medical issues but the patient does have anemia (hgb 8);  creatinine stably elevated at 2.3;  calcium levels normal.  No reports of bone lesions.    Reviewing labs in [facility] - the patient has a longstanding monoclonal gammopathy.  M spike was 1.1 in 4/22;  and 0.9 in 12/2020.    The rise in the M-spike since last check in 4/2022 may be related to progression of a plasma cell dyscrasia.  Difficult to say if this would still be an MGUS or symptomatic plasma cell dyscrasia as there may be other etiologies responsible for the anemia and kidney disease    Recommend updating the anemia workup with iron studies (since last checked 10/2023).  B12/folate were normal at that time so no need to repeat those now.    If Nephrology does not have an alternative explanation for the renal dysfunction, suggest proceeding w/ a kidney biopsy to evaluate for myeloma kidney or other paraprotein-related renal disease.  Favor this over a bone marrow biopsy, since a bone marrow biopsy is unlikely to be definitive in this situation.

## 2024-01-21 NOTE — CONSULT NOTE ADULT - CONSULT REQUESTED DATE/TIME
01-Dec-2023 17:32
01-Sep-2023 19:38
09-Sep-2023 18:02
21-Jan-2024 22:46
23-Aug-2023 15:03
25-Aug-2023 14:00
10-Sep-2023 06:42
26-Jun-2023
03-Jul-2023 14:56
11-Sep-2023 13:02
27-Jun-2023 18:08
01-Nov-2023 15:50
01-Sep-2023 16:17
30-Jun-2023 22:05
04-Dec-2023 17:43
24-Jun-2023 14:47
25-Oct-2023 09:28
24-Nov-2023 13:16

## 2024-01-21 NOTE — CONSULT NOTE ADULT - ASSESSMENT
XXXXX    Review of Studies:      Recommendations:    #XX  -  -  -      #XX  -  -  -      Recommendations above are preliminary pending discussion with an attending cardiologist  Plan was discussed with primary team  We'll continue to follow, thank you for the consultation      Bindu Díaz   Cardiovascular Disease Fellow  Consult Pager: [phone]         78M w/ PMHx of Crohn's, HTN, HLD, AFib/Flutter s/p multiple DCCV (on Amio)s/p ILR, Gout, PSHx R IHR, ileocecectomy, who has been here for prolonged hospital course since 6/2022, initially for open TRE for Crohn's flare c/b enterocutaneous fistula, wound dehiscence, and fungemia. Followed by EP for A flutter management and cardiology called today for episode of sinus pause with syncope.    Review of Studies:  TTE 1/04:  2. Hyperdynamic left ventricular systolic function.   3. Moderate symmetric left ventricular hypertrophy.   4. Grade II left ventriculardiastolic dysfunction.   5. Normal right ventricular size and systolic function.   6. Normal atria.   7. Aortic sclerosis without significant stenosis.   8. Mild-to-moderate aortic regurgitation.   9. Pulmonary artery systolic pressure is 40 mmHg, assuming a right   atrial pressure of 8 mmHg.  10. No pericardial effusion.  11. Left pleural effusion.      Recommendations:    #XX  -  -  -      #XX  -  -  -      Recommendations above are preliminary pending discussion with an attending cardiologist  Plan was discussed with primary team  We'll continue to follow, thank you for the consultation      Bindu Díaz   Cardiovascular Disease Fellow  Consult Pager: [phone]         78M w/ PMHx of Crohn's, HTN, HLD, AFib/Flutter s/p multiple DCCV (on Amio)s/p ILR, Gout, PSHx R IHR, ileocecectomy, who has been here for prolonged hospital course since 6/2022, initially for open TRE for Crohn's flare c/b enterocutaneous fistula, wound dehiscence, and fungemia. Followed by EP for A flutter management and cardiology called today for episode of sinus pause with syncope.    Review of Studies:  TTE 1/04:  2. Hyperdynamic left ventricular systolic function.   3. Moderate symmetric left ventricular hypertrophy.   4. Grade II left ventriculardiastolic dysfunction.   5. Normal right ventricular size and systolic function.   6. Normal atria.   7. Aortic sclerosis without significant stenosis.   8. Mild-to-moderate aortic regurgitation.   9. Pulmonary artery systolic pressure is 40 mmHg, assuming a right   atrial pressure of 8 mmHg.  10. No pericardial effusion.  11. Left pleural effusion.      Recommendations:    #Sinus arrest  #Aflutter  - reviewed tele - pt noted to have prolonged sinus arrest (20 seconds total with ~3 ventricular escape beats) although there was also artifact on the tele at this time  - associated with syncopal episode with spontaneous return of consciousness  - pt reports straining with urinating prior to episode, possibly due to increased vagal tone  - currently back in A flutter with HR in 90s, asymptomatic  - would hold off on BB (metoprolol, timolol, labetalol) for now given negative chronotropic effects and potential for repeat episode  - replete electrolytes for goal K>4. Mg>2, Ph>3  - EP following pt, will have them f/u in AM      Recommendations above are preliminary pending discussion with an attending cardiologist  Plan was discussed with primary team  We'll continue to follow, thank you for the consultation      Bindu Díaz   Cardiovascular Disease Fellow  Consult Pager: [phone]         78M w/ PMHx of Crohn's, HTN, HLD, AFib/Flutter s/p multiple DCCV (on Amio)s/p ILR, Gout, PSHx R IHR, ileocecectomy, who has been here for prolonged hospital course since 6/2022, initially for open TRE for Crohn's flare c/b enterocutaneous fistula, wound dehiscence, and fungemia. Followed by EP for A flutter management and cardiology called today for episode of sinus pause with syncope.    Review of Studies:  TTE 1/04:  2. Hyperdynamic left ventricular systolic function.   3. Moderate symmetric left ventricular hypertrophy.   4. Grade II left ventriculardiastolic dysfunction.   5. Normal right ventricular size and systolic function.   6. Normal atria.   7. Aortic sclerosis without significant stenosis.   8. Mild-to-moderate aortic regurgitation.   9. Pulmonary artery systolic pressure is 40 mmHg, assuming a right   atrial pressure of 8 mmHg.  10. No pericardial effusion.  11. Left pleural effusion.      Recommendations:    #Sinus arrest  #Aflutter  - reviewed tele - pt noted to have prolonged sinus arrest on the tele at this time  - associated with syncopal episode with spontaneous return of consciousness  - pt reports straining with urinating prior to episode, possibly due to increased vagal tone  - currently back in A flutter with HR in 90s, asymptomatic  - would hold off on BB (metoprolol, timolol, labetalol) for now given negative chronotropic effects and potential for repeat episode  - replete electrolytes for goal K>4. Mg>2, Ph>3  - EP following pt, will have them f/u in AM      Recommendations above are preliminary pending discussion with an attending cardiologist  Plan was discussed with primary team  We'll continue to follow, thank you for the consultation      Bindu Díaz   Cardiovascular Disease Fellow  Consult Pager: [phone]

## 2024-01-21 NOTE — PROGRESS NOTE ADULT - ASSESSMENT

## 2024-01-21 NOTE — PROGRESS NOTE ADULT - SUBJECTIVE AND OBJECTIVE BOX
Overnight events:       POD#    SUBJECTIVE:      MEDICATIONS  (STANDING):  cefepime   IVPB 2000 milliGRAM(s) IV Intermittent every 12 hours  chlorhexidine 2% Cloths 1 Application(s) Topical <User Schedule>  cholestyramine Powder (Sugar-Free) 4 Gram(s) Oral daily  enoxaparin Injectable 40 milliGRAM(s) SubCutaneous every 24 hours  epoetin matt-epbx (RETACRIT) Injectable 23150 Unit(s) SubCutaneous every 7 days  ergocalciferol 62779 Unit(s) Oral <User Schedule>  gabapentin 100 milliGRAM(s) Oral at bedtime  glucagon  Injectable 1 milliGRAM(s) IntraMuscular once  hydrALAZINE Injectable 10 milliGRAM(s) IV Push every 12 hours  levothyroxine Injectable 30 MICROGram(s) IV Push at bedtime  lipid, fat emulsion (Fish Oil and Plant Based) 20% Infusion 0.5319 Gm/kG/Day (20.83 mL/Hr) IV Continuous <Continuous>  melatonin 5 milliGRAM(s) Oral at bedtime  metoprolol tartrate Injectable 10 milliGRAM(s) IV Push every 6 hours  Parenteral Nutrition - Adult 1 Each TPN Continuous <Continuous>  timolol 0.5% Solution 1 Drop(s) Both EYES daily  ursodiol Suspension 300 milliGRAM(s) Oral every 8 hours  venlafaxine XR. 150 milliGRAM(s) Oral daily    MEDICATIONS  (PRN):  chlorhexidine 0.12% Liquid 15 milliLiter(s) Swish and Spit two times a day PRN oral hygeine  EPINEPHrine   1 mG/mL (1:1,000) Topical Solution 1 Application(s) Topical every 24 hours PRN for wound bleeding  LORazepam   Injectable 1 milliGRAM(s) IV Push <User Schedule> PRN Anxiety  ondansetron Injectable 4 milliGRAM(s) IV Push every 6 hours PRN Nausea and/or Vomiting      Vital Signs Last 24 Hrs  T(C): 36.8 (20 Jan 2024 22:30), Max: 36.8 (20 Jan 2024 22:30)  T(F): 98.2 (20 Jan 2024 22:30), Max: 98.2 (20 Jan 2024 22:30)  HR: 100 (21 Jan 2024 05:08) (90 - 106)  BP: 139/63 (21 Jan 2024 01:20) (123/61 - 161/80)  BP(mean): 90 (21 Jan 2024 01:20) (85 - 113)  RR: 19 (21 Jan 2024 05:06) (19 - 26)  SpO2: 99% (21 Jan 2024 05:08) (94% - 99%)    Parameters below as of 21 Jan 2024 05:06  Patient On (Oxygen Delivery Method): BiPAP/CPAP    O2 Concentration (%): 40    Physical Exam:  General: NAD, resting comfortably in bed  Pulmonary: Nonlabored breathing, no respiratory distress  Cardiovascular: NSR  Abdominal: soft, NT/ND  Extremities: WWP, normal strength  Neuro: A/O x 3, CNs II-XII grossly intact, no focal deficits, normal motor/sensation  Pulses: palpable distal pulses    I&O's Summary    19 Jan 2024 07:01  -  20 Jan 2024 07:00  --------------------------------------------------------  IN: 4839.2 mL / OUT: 3205 mL / NET: 1634.2 mL    20 Jan 2024 07:01  -  21 Jan 2024 05:59  --------------------------------------------------------  IN: 1750.4 mL / OUT: 2560 mL / NET: -809.6 mL        LABS:                        8.2    9.22  )-----------( 167      ( 21 Jan 2024 05:30 )             26.2               CAPILLARY BLOOD GLUCOSE            RADIOLOGY & ADDITIONAL STUDIES:   Overnight events: No acute overnight events.       POD#  6/27: Laparoscopic lysis of adhesions, converted to open lysis of adhesions, SBR x 3, temporary abdominal closure, 5L cryst, 1L 5% alb, 3 pRBC, 1 FFP, 1 plts  6/28: ex lap, removal of abthera, ileocolic resection, small bowel anastamosis, , 1.6 crystalloid, 250 5%, 175 uop   7/3: IR Gendel drained perihepatic aspiration of serous fluid.   7/5: RTOR exlap, washout, ileocolic resection with end ileostomy, blow hole colostomy, fistula, red rubber from ileostomy to small bowel anastomosis; vicryl bridging mesh; R JOYCE below ileostomy, L JOYCE at small bowel enterotomy repair; 500 LR, 500 5% albumin, 3u PRBC, 2 FFP, 400 UOP,   9/11: s/p perc cony    SUBJECTIVE: Patient seen at bedside with chief resident. Patient denies nausea or vomiting.       MEDICATIONS  (STANDING):  cefepime   IVPB 2000 milliGRAM(s) IV Intermittent every 12 hours  chlorhexidine 2% Cloths 1 Application(s) Topical <User Schedule>  cholestyramine Powder (Sugar-Free) 4 Gram(s) Oral daily  enoxaparin Injectable 40 milliGRAM(s) SubCutaneous every 24 hours  epoetin matt-epbx (RETACRIT) Injectable 46096 Unit(s) SubCutaneous every 7 days  ergocalciferol 47360 Unit(s) Oral <User Schedule>  gabapentin 100 milliGRAM(s) Oral at bedtime  glucagon  Injectable 1 milliGRAM(s) IntraMuscular once  hydrALAZINE Injectable 10 milliGRAM(s) IV Push every 12 hours  levothyroxine Injectable 30 MICROGram(s) IV Push at bedtime  lipid, fat emulsion (Fish Oil and Plant Based) 20% Infusion 0.5319 Gm/kG/Day (20.83 mL/Hr) IV Continuous <Continuous>  melatonin 5 milliGRAM(s) Oral at bedtime  metoprolol tartrate Injectable 10 milliGRAM(s) IV Push every 6 hours  Parenteral Nutrition - Adult 1 Each TPN Continuous <Continuous>  timolol 0.5% Solution 1 Drop(s) Both EYES daily  ursodiol Suspension 300 milliGRAM(s) Oral every 8 hours  venlafaxine XR. 150 milliGRAM(s) Oral daily    MEDICATIONS  (PRN):  chlorhexidine 0.12% Liquid 15 milliLiter(s) Swish and Spit two times a day PRN oral hygeine  EPINEPHrine   1 mG/mL (1:1,000) Topical Solution 1 Application(s) Topical every 24 hours PRN for wound bleeding  LORazepam   Injectable 1 milliGRAM(s) IV Push <User Schedule> PRN Anxiety  ondansetron Injectable 4 milliGRAM(s) IV Push every 6 hours PRN Nausea and/or Vomiting      Vital Signs Last 24 Hrs  T(C): 36.8 (20 Jan 2024 22:30), Max: 36.8 (20 Jan 2024 22:30)  T(F): 98.2 (20 Jan 2024 22:30), Max: 98.2 (20 Jan 2024 22:30)  HR: 100 (21 Jan 2024 05:08) (90 - 106)  BP: 139/63 (21 Jan 2024 01:20) (123/61 - 161/80)  BP(mean): 90 (21 Jan 2024 01:20) (85 - 113)  RR: 19 (21 Jan 2024 05:06) (19 - 26)  SpO2: 99% (21 Jan 2024 05:08) (94% - 99%)    Parameters below as of 21 Jan 2024 05:06  Patient On (Oxygen Delivery Method): BiPAP/CPAP    O2 Concentration (%): 40    Physical Exam:  General: NAD, resting comfortably in bed  Pulmonary: Nonlabored breathing, no respiratory distress  Cardiovascular: NSR  Gastrointestinal: soft, NT, ND, BS+, right ileostomy no sig bright red blood, left fistula with yellowish thick drainage. perc cony capped.   Extremities: warm, 1+ dependent edemastrength  Neuro: A/O x 3, CNs II-XII grossly intact, no focal deficits, normal motor/sensation  Pulses: palpable distal pulses    I&O's Summary    19 Jan 2024 07:01  -  20 Jan 2024 07:00  --------------------------------------------------------  IN: 4839.2 mL / OUT: 3205 mL / NET: 1634.2 mL    20 Jan 2024 07:01  -  21 Jan 2024 05:59  --------------------------------------------------------  IN: 1750.4 mL / OUT: 2560 mL / NET: -809.6 mL        LABS:                        8.2    9.22  )-----------( 167      ( 21 Jan 2024 05:30 )             26.2               CAPILLARY BLOOD GLUCOSE            RADIOLOGY & ADDITIONAL STUDIES:

## 2024-01-21 NOTE — CONSULT NOTE ADULT - CONSULT REASON
Elevated liver enzymes and bilirubin
Oliguric ELVI
intraabdominal infection
Candidemia
ELVI, oliguria, hyponatremia
Open abdomen, intubated
AMS
AMS
M spike
RLQ fluid collection/aspiration
concern for sepsis.
request for percutaneous cholecystostomy placement
right pneumothorax
Sinus pause
junctional rhythm
SBO
request for perc cony tube check
Direct hyperbilirubinemia

## 2024-01-21 NOTE — PROGRESS NOTE ADULT - ASSESSMENT
78 year old male with a PMHx of Crohn's, AFib/Flutter s/p DCCVs on amiodarone, remote ileocectomy and open appendectomy. Admitted (6/23) for SBO vs Crohns flare, s/p NGT decompression and s/p lap converted to open TRE, SBR x 3, left in discontinuity with abthera vac on (6/27), RTOR for ileocolic resection, small bowel anastomosis, and abdominal wall closure on (6/28), c/b fluid collection s/p IR aspiration of perihepatic fluid on (7/3), c/b wound dehiscence s/p RTOR exlap, washout, ileocolic resection with end ileostomy, blow hole colostomy, red rubber from ileostomy to small bowel anastomosis; vicryl bridging mesh on (7/5) transferred to SICU postoperatively for hemodynamic monitoring, with hospital course complicated by periods of severe ELVI and hyponatremia, which resolved but stepped back up for to SICU on (9/10) for acute AMS, intermittent hypoglycemia, AFib with RVR. Percutaneous Cholecystostomy placed on (9/11) for enlarged GB, PCT capped 10/5 and uncapped 10/25 for hyperbilirubinemia, Right brachial DVT, left basillic and cephalic superficial thrombus on duplex 11/2, CT 11/14 with ALDEN ground glass opacity of unclear etiology, completed empiric 7day cefepime course 11/22, now w resolved candida glabrata fungemia, ELVI improving. Having some intermittent bleeding on ileostomy.       - Continue TPN   - Continue wound care   - Appreciate excellent care by SICU team 78 year old male with a PMHx of Crohn's, AFib/Flutter s/p DCCVs on amiodarone, remote ileocectomy and open appendectomy. Admitted (6/23) for SBO vs Crohns flare, s/p NGT decompression and s/p lap converted to open TRE, SBR x 3, left in discontinuity with abthera vac on (6/27), RTOR for ileocolic resection, small bowel anastomosis, and abdominal wall closure on (6/28), c/b fluid collection s/p IR aspiration of perihepatic fluid on (7/3), c/b wound dehiscence s/p RTOR exlap, washout, ileocolic resection with end ileostomy, blow hole colostomy, red rubber from ileostomy to small bowel anastomosis; vicryl bridging mesh on (7/5) transferred to SICU postoperatively for hemodynamic monitoring, with hospital course complicated by periods of severe ELVI and hyponatremia, which resolved but stepped back up for to SICU on (9/10) for acute AMS, intermittent hypoglycemia, AFib with RVR. Percutaneous Cholecystostomy placed on (9/11) for enlarged GB, PCT capped 10/5 and uncapped 10/25 for hyperbilirubinemia, Right brachial DVT, left basillic and cephalic superficial thrombus on duplex 11/2, CT 11/14 with ALDEN ground glass opacity of unclear etiology, completed empiric 7day cefepime course 11/22, now w resolved candida glabrata fungemia, ELVI improving. Having some intermittent bleeding on ileostomy. Needed 1L of fluids for net even.       - Continue TPN   - Continue wound care   - Appreciate excellent care by SICU team

## 2024-01-21 NOTE — CONSULT NOTE ADULT - PROVIDER SPECIALTY LIST ADULT
Infectious Disease
[facility]
SICU
Hepatology
[facility]
[facility]
Nephrology
Ophthalmology
SICU
Electrophysiology
Cardiology
Neurology
SICU
SICU
Gastroenterology
Infectious Disease
Thoracic Surgery
Heme/Onc
Surgery

## 2024-01-21 NOTE — PROGRESS NOTE ADULT - SUBJECTIVE AND OBJECTIVE BOX
INTERVAL/OVERNIGHT EVENTS: One liter LR given for net negative.    SUBJECTIVE: Patient seen at bedside this am. Endorses slept well, mild shortness of breath. Denies headache, dizziness, nausea, vomiting, chest pain, cough, abdominal pain,       Neurologic Medications  gabapentin 100 milliGRAM(s) Oral at bedtime  LORazepam   Injectable 1 milliGRAM(s) IV Push <User Schedule> PRN Anxiety  melatonin 5 milliGRAM(s) Oral at bedtime  ondansetron Injectable 4 milliGRAM(s) IV Push every 6 hours PRN Nausea and/or Vomiting  venlafaxine XR. 150 milliGRAM(s) Oral daily    Respiratory Medications  albuterol/ipratropium for Nebulization 3 milliLiter(s) Nebulizer every 6 hours PRN Shortness of Breath and/or Wheezing    Cardiovascular Medications  hydrALAZINE Injectable 10 milliGRAM(s) IV Push every 12 hours  metoprolol tartrate Injectable 10 milliGRAM(s) IV Push every 6 hours    Gastrointestinal Medications  ergocalciferol 16364 Unit(s) Oral <User Schedule>  lipid, fat emulsion (Fish Oil and Plant Based) 20% Infusion 0.5319 Gm/kG/Day IV Continuous <Continuous>  Parenteral Nutrition - Adult 1 Each TPN Continuous <Continuous>  Parenteral Nutrition - Adult 1 Each TPN Continuous <Continuous>  ursodiol Suspension 300 milliGRAM(s) Oral every 8 hours    Hematologic/Oncologic Medications  enoxaparin Injectable 40 milliGRAM(s) SubCutaneous every 24 hours  epoetin matt-epbx (RETACRIT) Injectable 42721 Unit(s) SubCutaneous every 7 days    Antimicrobial/Immunologic Medications  cefepime   IVPB 2000 milliGRAM(s) IV Intermittent every 12 hours    Endocrine/Metabolic Medications  cholestyramine Powder (Sugar-Free) 4 Gram(s) Oral daily  glucagon  Injectable 1 milliGRAM(s) IntraMuscular once  levothyroxine Injectable 30 MICROGram(s) IV Push at bedtime    Topical/Other Medications  chlorhexidine 0.12% Liquid 15 milliLiter(s) Swish and Spit two times a day PRN oral hygeine  chlorhexidine 2% Cloths 1 Application(s) Topical <User Schedule>  EPINEPHrine   1 mG/mL (1:1,000) Topical Solution 1 Application(s) Topical every 24 hours PRN for wound bleeding  timolol 0.5% Solution 1 Drop(s) Both EYES daily      MEDICATIONS  (PRN):  albuterol/ipratropium for Nebulization 3 milliLiter(s) Nebulizer every 6 hours PRN Shortness of Breath and/or Wheezing  chlorhexidine 0.12% Liquid 15 milliLiter(s) Swish and Spit two times a day PRN oral hygeine  EPINEPHrine   1 mG/mL (1:1,000) Topical Solution 1 Application(s) Topical every 24 hours PRN for wound bleeding  LORazepam   Injectable 1 milliGRAM(s) IV Push <User Schedule> PRN Anxiety  ondansetron Injectable 4 milliGRAM(s) IV Push every 6 hours PRN Nausea and/or Vomiting      I&O's Detail    20 Jan 2024 07:01  -  21 Jan 2024 07:00  --------------------------------------------------------  IN:    Fat Emulsion (Fish Oil &amp; Plant Based) 20% Infusion: 270.4 mL    IV PiggyBack: 50 mL    IV PiggyBack: 1000 mL    Oral Fluid: 1080 mL    TPN (Total Parenteral Nutrition): 3212 mL  Total IN: 5612.4 mL    OUT:    Drain (mL): 1810 mL    Drain (mL): 50 mL    Voided (mL): 1100 mL  Total OUT: 2960 mL    Total NET: 2652.4 mL      21 Jan 2024 07:01  -  21 Jan 2024 12:13  --------------------------------------------------------  IN:    TPN (Total Parenteral Nutrition): 214 mL  Total IN: 214 mL    OUT:    Drain (mL): 150 mL    Drain (mL): 10 mL    Voided (mL): 125 mL  Total OUT: 285 mL    Total NET: -71 mL          Vital Signs Last 24 Hrs  T(C): 36.6 (21 Jan 2024 06:20), Max: 36.8 (20 Jan 2024 22:30)  T(F): 97.8 (21 Jan 2024 06:20), Max: 98.2 (20 Jan 2024 22:30)  HR: 104 (21 Jan 2024 09:00) (90 - 106)  BP: 142/74 (21 Jan 2024 09:00) (123/61 - 161/80)  BP(mean): 102 (21 Jan 2024 09:00) (85 - 113)  RR: 22 (21 Jan 2024 09:00) (19 - 26)  SpO2: 95% (21 Jan 2024 09:00) (94% - 99%)    Parameters below as of 21 Jan 2024 09:00  Patient On (Oxygen Delivery Method): BiPAP/CPAP    O2 Concentration (%): 40    GENERAL: NAD, resting comfortably in bed  HEENT: NCAT, MMM  C/V: Normal rate, regular rhythm without murmurs, rubs, or gallops  PULM: Nonlabored breathing, no respiratory distress, some expiratory wheezes  ABD: Soft, ND, NT, no rebound tenderness, no guarding, midline wound bag c/d/i intact blood in bag or erythema at site.  EXTREM: WWP, no edema, SCDs in place  NEURO: No focal deficits    LABS:                        8.2    9.22  )-----------( 167      ( 21 Jan 2024 05:30 )             26.2     01-21    138  |  103  |  40<H>  ----------------------------<  126<H>  4.3   |  30  |  1.24    Ca    8.2<L>      21 Jan 2024 05:30  Phos  3.3     01-21  Mg     2.0     01-21    TPro  7.7  /  Alb  2.5<L>  /  TBili  4.9<H>  /  DBili  3.4<H>  /  AST  110<H>  /  ALT  72<H>  /  AlkPhos  110  01-21      Urinalysis Basic - ( 21 Jan 2024 05:30 )    Color: x / Appearance: x / SG: x / pH: x  Gluc: 126 mg/dL / Ketone: x  / Bili: x / Urobili: x   Blood: x / Protein: x / Nitrite: x   Leuk Esterase: x / RBC: x / WBC x   Sq Epi: x / Non Sq Epi: x / Bacteria: x        RADIOLOGY & ADDITIONAL STUDIES:

## 2024-01-21 NOTE — CONSULT NOTE ADULT - SUBJECTIVE AND OBJECTIVE BOX
HPI:  78M w/ PMHx of Crohn's, HTN, HLD, AFib/Flutter s/p multiple DCCV (on Amio), Gout, PSHx R IHR, ileocecectomy, open appy presents for abdominal pain since last night. Pt states his pain is constant, severe pain diffusely. He states he has gotten this pain previously, though not as intensely that he has managed at home. Last BM and flatus was today. Denies n/v. Denies f/c. Denies cp/sob. Denies urinary complaints. Denies PO intake today. Pt was sent for a CT scan as an outpatient that showed distended loops of bowel with a possible transition point in the RLQ w/ fecalization of bowel contents.    In the ED, pt was afebrile, nontachycardic, hypertensive, and satting well on RA. Labs significant for WBC 11.77. Hgb 13.0. LA 1.4.    PMHx: HTN, Atrial fibrillation s/p multiple DCCV, Crohn's disease, HLD, Hypothyroidism  PSHx: Ileocecectomy 30 years ago, open appy, H/O knee surgery, History of cataract surgery  Family Hx: Denies family hx of IBS, Crohn's, UC, or colon cancer.  Last Cscope: 6 months ago wnl  Last EGD: 6 months ago wnl  All: penicillin (Angioedema)    Meds: allopurinol 300 mg oral tablet: 1 tab(s) orally once a day  amiodarone 200 mg oral tablet: 1 orally once a day  metoprolol tartrate 50 mg oral tablet: 1 orally 2 times a day  rosuvastatin 5 mg oral tablet: 1 tab(s) orally once a day  Synthroid 50 mcg (0.05 mg) oral tablet: 1 tab(s) orally once a day  venlafaxine 150 mg oral tablet, extended release: 1 tab(s) orally 2 times a day      Vitals past 24h:  T(F): 97.6 (13:29), Max: 97.6 (13:29)  HR: 98 (13:29) (98 - 98)  BP: 194/72 (13:29) (194/72 - 194/72)  RR: 20 (13:29) (20 - 20)  SpO2: 98% (13:29) (98% - 98%)   (23 Jun 2023 16:52)        Pt seen and examined at bedside, NAD, ROS s/f ?, otherwise remainder of ROS is unremarkable     ROS: Per HPI    PAST MEDICAL & SURGICAL HISTORY:  Essential hypertension  Hypertension      Atrial fibrillation  s/p cardioversion 2010 and 2014  Pt. reports 4 DCCV  Now on Amiodarone 200mg PO bid and Eliquis 5mg PO bid  Last DCCV 4 yrs ago at [facility]      Crohn's disease  s/p partial resection of ileum      Hyperlipidemia      Hypothyroidism      History of depression  On Venlafaxine ER 150mg PO bid      Junctional rhythm      H/O knee surgery      History of cataract surgery        SOCIAL HISTORY:  FAMILY HISTORY:    ALLERGIES: 	  penicillin (Angioedema)          MEDICATIONS:  albuterol/ipratropium for Nebulization 3 milliLiter(s) Nebulizer every 6 hours PRN  cefepime   IVPB 2000 milliGRAM(s) IV Intermittent every 12 hours  chlorhexidine 0.12% Liquid 15 milliLiter(s) Swish and Spit two times a day PRN  chlorhexidine 2% Cloths 1 Application(s) Topical <User Schedule>  cholestyramine Powder (Sugar-Free) 4 Gram(s) Oral daily  enoxaparin Injectable 40 milliGRAM(s) SubCutaneous every 24 hours  EPINEPHrine   1 mG/mL (1:1,000) Topical Solution 1 Application(s) Topical every 24 hours PRN  epoetin matt-epbx (RETACRIT) Injectable 81669 Unit(s) SubCutaneous every 7 days  ergocalciferol 45337 Unit(s) Oral <User Schedule>  gabapentin 100 milliGRAM(s) Oral at bedtime  glucagon  Injectable 1 milliGRAM(s) IntraMuscular once  hydrALAZINE Injectable 10 milliGRAM(s) IV Push every 12 hours  labetalol Injectable 10 milliGRAM(s) IV Push once  levothyroxine Injectable 30 MICROGram(s) IV Push at bedtime  lipid, fat emulsion (Fish Oil and Plant Based) 20% Infusion 0.5319 Gm/kG/Day IV Continuous <Continuous>  LORazepam   Injectable 1 milliGRAM(s) IV Push <User Schedule> PRN  melatonin 5 milliGRAM(s) Oral at bedtime  ondansetron Injectable 4 milliGRAM(s) IV Push every 6 hours PRN  Parenteral Nutrition - Adult 1 Each TPN Continuous <Continuous>  Parenteral Nutrition - Adult 1 Each TPN Continuous <Continuous>  ursodiol Suspension 300 milliGRAM(s) Oral every 8 hours  venlafaxine XR. 150 milliGRAM(s) Oral daily      T(C): 36.8 (01-21-24 @ 22:21), Max: 36.8 (01-21-24 @ 22:21)  HR: 100 (01-21-24 @ 22:00) (72 - 104)  BP: 144/65 (01-21-24 @ 22:00) (124/67 - 201/90)  RR: 31 (01-21-24 @ 22:00) (15 - 31)  SpO2: 99% (01-21-24 @ 22:00) (94% - 100%)    01-20-24 @ 07:01  -  01-21-24 @ 07:00  --------------------------------------------------------  IN: 5612.4 mL / OUT: 2960 mL / NET: 2652.4 mL    01-21-24 @ 07:01  -  01-21-24 @ 22:46  --------------------------------------------------------  IN: 2330 mL / OUT: 1335 mL / NET: 995 mL        PHYSICAL EXAM:    Constitutional: resting comfortably in bed; NAD  HEENT: NC/AT,  MMM  Neck: supple;  JVP ? cm H20, JVD +/-  Respiratory: CTA B/L; no W/R/R  Cardiac: +S1/S2; RRR; no M/R/G; PMI non-displaced  Gastrointestinal: soft, NT/ND; no rebound or guarding  Extremities: no clubbing or cyanosis; no peripheral edema  Musculoskeletal: no joint swelling, tenderness or erythema  Vascular: 2+ radial, DP/PT pulses B/L  Dermatologic: skin warm, dry and intact; no rashes, wounds, or scars  Neurologic: AAOx3; CNII-XII grossly intact; no focal deficits        I&O's Summary    20 Jan 2024 07:01  -  21 Jan 2024 07:00  --------------------------------------------------------  IN: 5612.4 mL / OUT: 2960 mL / NET: 2652.4 mL    21 Jan 2024 07:01  -  21 Jan 2024 22:46  --------------------------------------------------------  IN: 2330 mL / OUT: 1335 mL / NET: 995 mL        LABS:	 	                        8.2    9.22  )-----------( 167      ( 21 Jan 2024 05:30 )             26.2     01-21    138  |  103  |  40<H>  ----------------------------<  126<H>  4.3   |  30  |  1.24    Ca    8.2<L>      21 Jan 2024 05:30  Phos  3.3     01-21  Mg     2.0     01-21    TPro  7.7  /  Alb  2.5<L>  /  TBili  4.9<H>  /  DBili  3.4<H>  /  AST  110<H>  /  ALT  72<H>  /  AlkPhos  110  01-21     HPI:  78M w/ PMHx of Crohn's, HTN, HLD, AFib/Flutter s/p multiple DCCV (on Amio) s/p ILR, Gout, PSHx R IHR, ileocecectomy, who has been here for prolonged hospital course since 6/2022, initially for open TRE for Crohn's flare c/b enterocutaneous fistula, wound dehiscence, and fungemia. Pt has been followed by EP this admission for aflutter, on IV metoprolol and timolol eye drops for rate control. Pt states when his rates are uncontrolled he does feel palpitations.    Today, pt noted to have a syncopal episode with sinus arrest for which cardiology re-consulted. Pt reports he was sitting up and reached for the urinal, states he may have been straining to urinate as he intermittently has bladder spasms when he "black out" and awoke with people around him trying to wake him up. At that time, telemetry noted to have a prolonged sinus pause (~20 seconds) with few ventricular escape beats.      ROS: Per HPI    PAST MEDICAL & SURGICAL HISTORY:  Essential hypertension  Hypertension      Atrial fibrillation  s/p cardioversion 2010 and 2014  Pt. reports 4 DCCV  Now on Amiodarone 200mg PO bid and Eliquis 5mg PO bid  Last DCCV 4 yrs ago at       Crohn's disease  s/p partial resection of ileum      Hyperlipidemia      Hypothyroidism      History of depression  On Venlafaxine ER 150mg PO bid      Junctional rhythm      H/O knee surgery      History of cataract surgery        SOCIAL HISTORY:  FAMILY HISTORY:    ALLERGIES: 	  penicillin (Angioedema)          MEDICATIONS:  albuterol/ipratropium for Nebulization 3 milliLiter(s) Nebulizer every 6 hours PRN  cefepime   IVPB 2000 milliGRAM(s) IV Intermittent every 12 hours  chlorhexidine 0.12% Liquid 15 milliLiter(s) Swish and Spit two times a day PRN  chlorhexidine 2% Cloths 1 Application(s) Topical <User Schedule>  cholestyramine Powder (Sugar-Free) 4 Gram(s) Oral daily  enoxaparin Injectable 40 milliGRAM(s) SubCutaneous every 24 hours  EPINEPHrine   1 mG/mL (1:1,000) Topical Solution 1 Application(s) Topical every 24 hours PRN  epoetin matt-epbx (RETACRIT) Injectable 70182 Unit(s) SubCutaneous every 7 days  ergocalciferol 56205 Unit(s) Oral <User Schedule>  gabapentin 100 milliGRAM(s) Oral at bedtime  glucagon  Injectable 1 milliGRAM(s) IntraMuscular once  hydrALAZINE Injectable 10 milliGRAM(s) IV Push every 12 hours  labetalol Injectable 10 milliGRAM(s) IV Push once  levothyroxine Injectable 30 MICROGram(s) IV Push at bedtime  lipid, fat emulsion (Fish Oil and Plant Based) 20% Infusion 0.5319 Gm/kG/Day IV Continuous <Continuous>  LORazepam   Injectable 1 milliGRAM(s) IV Push <User Schedule> PRN  melatonin 5 milliGRAM(s) Oral at bedtime  ondansetron Injectable 4 milliGRAM(s) IV Push every 6 hours PRN  Parenteral Nutrition - Adult 1 Each TPN Continuous <Continuous>  Parenteral Nutrition - Adult 1 Each TPN Continuous <Continuous>  ursodiol Suspension 300 milliGRAM(s) Oral every 8 hours  venlafaxine XR. 150 milliGRAM(s) Oral daily      T(C): 36.8 (01-21-24 @ 22:21), Max: 36.8 (01-21-24 @ 22:21)  HR: 100 (01-21-24 @ 22:00) (72 - 104)  BP: 144/65 (01-21-24 @ 22:00) (124/67 - 201/90)  RR: 31 (01-21-24 @ 22:00) (15 - 31)  SpO2: 99% (01-21-24 @ 22:00) (94% - 100%)    01-20-24 @ 07:01  -  01-21-24 @ 07:00  --------------------------------------------------------  IN: 5612.4 mL / OUT: 2960 mL / NET: 2652.4 mL    01-21-24 @ 07:01  -  01-21-24 @ 22:46  --------------------------------------------------------  IN: 2330 mL / OUT: 1335 mL / NET: 995 mL        PHYSICAL EXAM:    Constitutional: resting comfortably in bed; NAD on BiPAP  HEENT: NC/AT,  MMM, no JVD  Respiratory: on BIPAP, rales in LLL otherwise CTA  Cardiac: +S1/S2; RRR; no M/R/G  Gastrointestinal: +ileostomy in pace, +fistula with overlying pouch  Extremities: 1+ pitting edema in LE b/l with muscle wasting  Dermatologic: skin warm, dry and intact  Neurologic: AAOx3; CNII-XII grossly intact; no focal deficits        I&O's Summary    20 Jan 2024 07:01  -  21 Jan 2024 07:00  --------------------------------------------------------  IN: 5612.4 mL / OUT: 2960 mL / NET: 2652.4 mL    21 Jan 2024 07:01  -  21 Jan 2024 22:46  --------------------------------------------------------  IN: 2330 mL / OUT: 1335 mL / NET: 995 mL        LABS:	 	                        8.2    9.22  )-----------( 167      ( 21 Jan 2024 05:30 )             26.2     01-21    138  |  103  |  40<H>  ----------------------------<  126<H>  4.3   |  30  |  1.24    Ca    8.2<L>      21 Jan 2024 05:30  Phos  3.3     01-21  Mg     2.0     01-21    TPro  7.7  /  Alb  2.5<L>  /  TBili  4.9<H>  /  DBili  3.4<H>  /  AST  110<H>  /  ALT  72<H>  /  AlkPhos  110  01-21

## 2024-01-21 NOTE — CONSULT NOTE ADULT - CONSULT REQUESTED BY NAME
Dr. Peterson
Kristen
Dr Peterson
Dr. Peterson
Ra
SICU
Dr. Peterson
SICU
Dr. Chaney
Dr. Holley
Dr. Rikki Peterson
Surgery
Dr. Knapp
Team
SICU
Daniel Aguiar

## 2024-01-22 NOTE — PROGRESS NOTE ADULT - ASSESSMENT
The patient is a 78 year old male with a PMHx of Crohn's, AFib/Flutter s/p DCCVs on amiodarone, remote ileocectomy and open appendectomy. Admitted (6/23) for SBO vs Crohns flare, s/p NGT decompression and s/p lap converted to open TRE, SBR x 3, left in discontinuity with abthera vac on (6/27), RTOR for ileocolic resection, small bowel anastomosis, and abdominal wall closure on (6/28), c/b fluid collection s/p IR aspiration of perihepatic fluid on (7/3), c/b wound dehiscence s/p RTOR exlap, washout, ileocolic resection with end ileostomy, blow hole colostomy, red rubber from ileostomy to small bowel anastomosis; vicryl bridging mesh on (7/5) transferred to SICU postoperatively for hemodynamic monitoring, with hospital course complicated by periods of severe ELVI and hyponatremia, which resolved but stepped back up for to SICU on (9/10) for acute AMS, intermittent hypoglycemia, AFib with RVR. Percutaneous Cholecystostomy placed on (9/11) for enlarged GB, PCT capped 10/5 and uncapped 10/25 for hyperbilirubinemia, Right brachial DVT, left basillic and cephalic superficial thrombus on duplex 11/2, CT 11/14 with ALDEN ground glass opacity of unclear etiology, completed empiric 7day cefepime course 11/22, rising T-bili of unclear etiology now w resolved candida glabrata fungemia, ELVI improving. New syncopal pause overnight, no new complaints this morning.     - Continue TPN  - Follow cardiology recommendations  - [employer]   - Team 5 will follow  - pending final recs after discussion with attending

## 2024-01-22 NOTE — PROGRESS NOTE ADULT - SUBJECTIVE AND OBJECTIVE BOX
OVERNIGHT: patient had approx 20 second sinus pause with syncope, cardiology immediately consulted, dc'ed metoprolol, timolol eye gtt, prn Labetalol Pacer pads placed. No further episodes  SUBJECTIVE: Pt seen and examined at bedside this am by surgery team. Patient is sitting in bed comfortably, with BiPAP on.   Has been tolerating diet, denies pain. Denies fever, nausea, vomiting, chest pain, and shortness of breath.       MEDICATIONS  (STANDING):  cefepime   IVPB 2000 milliGRAM(s) IV Intermittent every 12 hours  chlorhexidine 2% Cloths 1 Application(s) Topical <User Schedule>  cholestyramine Powder (Sugar-Free) 4 Gram(s) Oral daily  enoxaparin Injectable 40 milliGRAM(s) SubCutaneous every 24 hours  epoetin matt-epbx (RETACRIT) Injectable 12978 Unit(s) SubCutaneous every 7 days  ergocalciferol 01357 Unit(s) Oral <User Schedule>  gabapentin 100 milliGRAM(s) Oral at bedtime  glucagon  Injectable 1 milliGRAM(s) IntraMuscular once  hydrALAZINE Injectable 10 milliGRAM(s) IV Push every 12 hours  labetalol Injectable 10 milliGRAM(s) IV Push once  levothyroxine Injectable 30 MICROGram(s) IV Push at bedtime  lipid, fat emulsion (Fish Oil and Plant Based) 20% Infusion 0.5319 Gm/kG/Day (20.83 mL/Hr) IV Continuous <Continuous>  lipid, fat emulsion (Fish Oil and Plant Based) 20% Infusion 0.5319 Gm/kG/Day (20.83 mL/Hr) IV Continuous <Continuous>  melatonin 5 milliGRAM(s) Oral at bedtime  Parenteral Nutrition - Adult 1 Each TPN Continuous <Continuous>  Parenteral Nutrition - Adult 1 Each TPN Continuous <Continuous>  ursodiol Suspension 300 milliGRAM(s) Oral every 8 hours  venlafaxine XR. 150 milliGRAM(s) Oral daily    MEDICATIONS  (PRN):  albuterol/ipratropium for Nebulization 3 milliLiter(s) Nebulizer every 6 hours PRN Shortness of Breath and/or Wheezing  chlorhexidine 0.12% Liquid 15 milliLiter(s) Swish and Spit two times a day PRN oral hygeine  EPINEPHrine   1 mG/mL (1:1,000) Topical Solution 1 Application(s) Topical every 24 hours PRN for wound bleeding  LORazepam   Injectable 1 milliGRAM(s) IV Push <User Schedule> PRN Anxiety  ondansetron Injectable 4 milliGRAM(s) IV Push every 6 hours PRN Nausea and/or Vomiting      Vital Signs Last 24 Hrs  T(C): 36.4 (22 Jan 2024 06:17), Max: 36.8 (21 Jan 2024 22:21)  T(F): 97.5 (22 Jan 2024 06:17), Max: 98.2 (21 Jan 2024 22:21)  HR: 102 (22 Jan 2024 07:00) (72 - 106)  BP: 166/86 (22 Jan 2024 07:00) (107/59 - 201/90)  BP(mean): 116 (22 Jan 2024 07:00) (74 - 129)  RR: 26 (22 Jan 2024 07:00) (15 - 31)  SpO2: 94% (22 Jan 2024 07:00) (94% - 100%)    Parameters below as of 22 Jan 2024 07:00  Patient On (Oxygen Delivery Method): BiPAP/CPAP    O2 Concentration (%): 40    Physical Exam  General: Patient is doing well and sitting in bed comfortably  Constitutional: alert and oriented   Pulm: On BiPAP (intermittent), no respiratory distress  CV: Regular rate  Abd: soft, nontender, nondistended. No rebound, no guarding. Wound manager in place, no bleeding, no leaks. soft semi-formed output, brown in color; Perc cony capped; Ileostomy with some red tinged output.   Extremities: mild peripheral edema    I&O's Detail    21 Jan 2024 07:01  -  22 Jan 2024 07:00  --------------------------------------------------------  IN:    Fat Emulsion (Fish Oil &amp; Plant Based) 20% Infusion: 208 mL    IV PiggyBack: 100 mL    Oral Fluid: 1140 mL    TPN (Total Parenteral Nutrition): 2570 mL  Total IN: 4018 mL    OUT:    Drain (mL): 50 mL    Drain (mL): 1000 mL    Voided (mL): 1250 mL  Total OUT: 2300 mL    Total NET: 1718 mL      22 Jan 2024 07:01  -  22 Jan 2024 08:05  --------------------------------------------------------  IN:    Oral Fluid: 60 mL    TPN (Total Parenteral Nutrition): 214 mL  Total IN: 274 mL    OUT:    Drain (mL): 150 mL  Total OUT: 150 mL    Total NET: 124 mL        LABS:                        8.2    9.22  )-----------( 167      ( 21 Jan 2024 05:30 )             26.2     01-21    138  |  103  |  40<H>  ----------------------------<  126<H>  4.3   |  30  |  1.24    Ca    8.2<L>      21 Jan 2024 05:30  Phos  3.3     01-21  Mg     2.0     01-21    TPro  7.7  /  Alb  2.5<L>  /  TBili  4.9<H>  /  DBili  3.4<H>  /  AST  110<H>  /  ALT  72<H>  /  AlkPhos  110  01-21      Urinalysis Basic - ( 21 Jan 2024 05:30 )    Color: x / Appearance: x / SG: x / pH: x  Gluc: 126 mg/dL / Ketone: x  / Bili: x / Urobili: x   Blood: x / Protein: x / Nitrite: x   Leuk Esterase: x / RBC: x / WBC x   Sq Epi: x / Non Sq Epi: x / Bacteria: x

## 2024-01-22 NOTE — PROGRESS NOTE ADULT - ASSESSMENT
The patient is a 78 year old male with a PMHx of Crohn's, AFib/Flutter s/p DCCVs on amiodarone, remote ileocectomy and open appendectomy. Admitted (6/23) for SBO vs Crohns flare, s/p NGT decompression and s/p lap converted to open TRE, SBR x 3, left in discontinuity with abthera vac on (6/27), RTOR for ileocolic resection, small bowel anastomosis, and abdominal wall closure on (6/28), c/b fluid collection s/p IR aspiration of perihepatic fluid on (7/3), c/b wound dehiscence s/p RTOR exlap, washout, ileocolic resection with end ileostomy, blow hole colostomy, red rubber from ileostomy to small bowel anastomosis; vicryl bridging mesh on (7/5) transferred to SICU postoperatively for hemodynamic monitoring, with hospital course complicated by periods of severe ELVI and hyponatremia, which resolved but stepped back up for to SICU on (9/10) for acute AMS, intermittent hypoglycemia, AFib with RVR. Percutaneous Cholecystostomy placed on (9/11) for enlarged GB, PCT capped 10/5 and uncapped 10/25 for hyperbilirubinemia, Right brachial DVT, left basillic and cephalic superficial thrombus on duplex 11/2, CT 11/14 with ALDEN ground glass opacity of unclear etiology, completed empiric 7day cefepime course 11/22, rising T-bili of unclear etiology now w resolved candida glabrata fungemia, ELVI improving.     EP Consult for sinus pause  GI consult for red tinged ileostomy output  Reg Diet  Pain/nausea control prn  Cefepime, last day today  BiPAP q4hprn  Wound manager changes daily with wound care  C/W octreotide, cholestyramine  C/W Epogen weekly  SQL/SCDs/OOBA/IS  Rest of care per SICU

## 2024-01-22 NOTE — PROGRESS NOTE ADULT - ASSESSMENT
78M w/ PMHx of Crohn's, HTN, HLD, AFib/Flutter s/p multiple DCCV (on Amio)s/p ILR, Gout, PSHx R IHR, ileocecectomy, who has been here for prolonged hospital course since 6/2022, initially for open TRE for Crohn's flare c/b enterocutaneous fistula, wound dehiscence, and fungemia. Followed by EP for Afib/ A flutter management and cardiology called today for episode of sinus pause in the setting of vasovagal syncope.  Review of Studies:    Tele: Now with Afib upto 120's. No pauses noted since last night   TTE 1/04:  Hyperdynamic left ventricular systolic function, ,moderate symmetric left ventricular hypertrophy, Grade II DD, normal RV function, normal atria, PASP 40mmHg, no pericardial effusion     #Vasovagal syncope leading to sinus arrest. Now resolved   #Afib/ Aflutter  - No further pauses since yesterday, His sinus pause was in the setting of vasovagal event ( straining to urinate). Denies any other symptoms leading upto events.   - Seen by EP. Given vasovagal event correlating to the event and no further pauses, OK to resume metoprolol 10mg q6h ( with holding parameters- hold for SBP<90 AND HR <60) and timolol 0.5%   - Will check how HR does on metoprolol. If remains elevated, will consider dig vs cardizem   - Please add on pro BNP  - CHADVASC atleast 3, but unable to tolerate AC at this time given multiple active issues  - replete electrolytes for goal K>4. Mg>2, Ph>3    Cardiology to follow.

## 2024-01-22 NOTE — PROGRESS NOTE ADULT - SUBJECTIVE AND OBJECTIVE BOX
INTERVAL EVENTS: No acute events. No more pauses noted on tele. He feels better now. Still with SOB. Given lasix 20 IV this AM     PAST MEDICAL & SURGICAL HISTORY:  Essential hypertension  Hypertension    Atrial fibrillation  s/p cardioversion 2010 and 2014  Pt. reports 4 DCCV  Now on Amiodarone 200mg PO bid and Eliquis 5mg PO bid  Last DCCV 4 yrs ago at [facility]    Crohn's disease  s/p partial resection of ileum    Hyperlipidemia    Hypothyroidism    ELVI (acute kidney injury)  No hx of CKD   Creatinine 1.5 on this admisison 4/5/22    History of depression  On Venlafaxine ER 150mg PO bid    Junctional rhythm    Other specified personal history presenting hazards to health  History of bowel resection    H/O knee surgery    History of cataract surgery        MEDICATIONS  (STANDING):  cefepime   IVPB 2000 milliGRAM(s) IV Intermittent every 12 hours  chlorhexidine 2% Cloths 1 Application(s) Topical <User Schedule>  cholestyramine Powder (Sugar-Free) 4 Gram(s) Oral daily  enoxaparin Injectable 40 milliGRAM(s) SubCutaneous every 24 hours  epoetin amtt-epbx (RETACRIT) Injectable 80677 Unit(s) SubCutaneous every 7 days  ergocalciferol 84088 Unit(s) Oral <User Schedule>  gabapentin 100 milliGRAM(s) Oral at bedtime  glucagon  Injectable 1 milliGRAM(s) IntraMuscular once  hydrALAZINE Injectable 10 milliGRAM(s) IV Push every 12 hours  influenza  Vaccine (HIGH DOSE) 0.7 milliLiter(s) IntraMuscular once  levothyroxine Injectable 30 MICROGram(s) IV Push at bedtime  lipid, fat emulsion (Fish Oil and Plant Based) 20% Infusion 0.5319 Gm/kG/Day (20.83 mL/Hr) IV Continuous <Continuous>  melatonin 5 milliGRAM(s) Oral at bedtime  metoprolol tartrate Injectable 10 milliGRAM(s) IV Push every 6 hours  Parenteral Nutrition - Adult 1 Each TPN Continuous <Continuous>  Parenteral Nutrition - Adult 1 Each TPN Continuous <Continuous>  timolol 0.5% Solution 1 Drop(s) Both EYES daily  ursodiol Suspension 300 milliGRAM(s) Oral every 8 hours  venlafaxine XR. 150 milliGRAM(s) Oral daily    MEDICATIONS  (PRN):  albuterol/ipratropium for Nebulization 3 milliLiter(s) Nebulizer every 6 hours PRN Shortness of Breath and/or Wheezing  chlorhexidine 0.12% Liquid 15 milliLiter(s) Swish and Spit two times a day PRN oral hygeine  EPINEPHrine   1 mG/mL (1:1,000) Topical Solution 1 Application(s) Topical every 24 hours PRN for wound bleeding  LORazepam   Injectable 1 milliGRAM(s) IV Push <User Schedule> PRN Anxiety  ondansetron Injectable 4 milliGRAM(s) IV Push every 6 hours PRN Nausea and/or Vomiting    Vital Signs Last 24 Hrs  T(C): 36.6 (22 Jan 2024 08:30), Max: 36.8 (21 Jan 2024 22:21)  T(F): 97.8 (22 Jan 2024 08:30), Max: 98.2 (21 Jan 2024 22:21)  HR: 117 (22 Jan 2024 12:00) (72 - 117)  BP: 141/103 (22 Jan 2024 12:00) (107/59 - 201/90)  BP(mean): 116 (22 Jan 2024 12:00) (74 - 129)  RR: 21 (22 Jan 2024 12:00) (15 - 31)  SpO2: 96% (22 Jan 2024 12:00) (94% - 100%)    Parameters below as of 22 Jan 2024 12:00  Patient On (Oxygen Delivery Method): BiPAP/CPAP        PHYSICAL EXAM:  GEN: Awake, alert. NAD.   HEENT: NCAT, PERRL, EOMI. Mucosa moist. No JVD.  RESP: CTA b/l  CV: RRR. Normal S1/S2. No m/r/g.  ABD: Soft. NT/ND. BS+  EXT: Warm. No edema, clubbing, or cyanosis.   NEURO: AAOx3. No focal deficits.     LABS:                        8.3    10.48 )-----------( 174      ( 22 Jan 2024 09:01 )             27.1     01-22    139  |  106  |  41<H>  ----------------------------<  76  4.4   |  27  |  1.17    Ca    8.2<L>      22 Jan 2024 09:01  Phos  2.8     01-22  Mg     2.1     01-22    TPro  8.7<H>  /  Alb  2.5<L>  /  TBili  5.2<H>  /  DBili  x   /  AST  113<H>  /  ALT  74<H>  /  AlkPhos  123<H>  01-22        PT/INR - ( 22 Jan 2024 09:01 )   PT: 14.6 sec;   INR: 1.29          PTT - ( 22 Jan 2024 09:01 )  PTT:38.4 sec  Urinalysis Basic - ( 22 Jan 2024 09:01 )    Color: x / Appearance: x / SG: x / pH: x  Gluc: 76 mg/dL / Ketone: x  / Bili: x / Urobili: x   Blood: x / Protein: x / Nitrite: x   Leuk Esterase: x / RBC: x / WBC x   Sq Epi: x / Non Sq Epi: x / Bacteria: x      I&O's Summary    21 Jan 2024 07:01  -  22 Jan 2024 07:00  --------------------------------------------------------  IN: 4018 mL / OUT: 2300 mL / NET: 1718 mL    22 Jan 2024 07:01  -  22 Jan 2024 13:10  --------------------------------------------------------  IN: 424 mL / OUT: 2400 mL / NET: -1976 mL      RADIOLOGY & ADDITIONAL STUDIES:  TELEMETRY:  EKG:

## 2024-01-22 NOTE — PROGRESS NOTE ADULT - SUBJECTIVE AND OBJECTIVE BOX
EPS Progress Note    S:     EP reconsulted for bradyarrhythmia last night.    Around 6pm yesterday patient was urinating and afterwards had a syncopal event. Telemetry reveals AF which slows into a long pause with ventricular escape beats. Total pause ~15 seconds. Conducting immediately recovers back to AF in the 100s. Episode also with significant artifact.     Given episode was during micturition with clear ventricular slowing before the event, event is likely vasovagal, pt with a high vagal tone.       O: T(C): 36.6 (01-22-24 @ 08:30), Max: 36.8 (01-21-24 @ 22:21)  HR: 111 (01-22-24 @ 09:00) (72 - 111)  BP: 124/71 (01-22-24 @ 09:00) (107/59 - 201/90)  RR: 26 (01-22-24 @ 09:00) (15 - 31)  SpO2: 96% (01-22-24 @ 09:00) (94% - 100%)      PHYSICAL  General:  NAD        Chest:  CTA B/L, no w/r/r  Cardiac:  RRR, + s1/s2 , no m/g/r  Abdomen:   soft ND/NT  Extremities: No edema, b/l groin no hematoma/bleeding/oozing  Skin: no rash noted, normal color and pigmentation  Psych: A&Ox3, normal affect and mood  Neuro: no deficit noted     LABS:                        8.3    10.48 )-----------( 174      ( 22 Jan 2024 09:01 )             27.1     01-22    139  |  106  |  41<H>  ----------------------------<  76  4.4   |  27  |  1.17    Ca    8.2<L>      22 Jan 2024 09:01  Phos  2.8     01-22  Mg     2.1     01-22    TPro  8.7<H>  /  Alb  2.5<L>  /  TBili  5.2<H>  /  DBili  x   /  AST  113<H>  /  ALT  74<H>  /  AlkPhos  123<H>  01-22    PT/INR - ( 22 Jan 2024 09:01 )   PT: 14.6 sec;   INR: 1.29          PTT - ( 22 Jan 2024 09:01 )  PTT:38.4 sec      MEDICATIONS:  albuterol/ipratropium for Nebulization 3 milliLiter(s) Nebulizer every 6 hours PRN  cefepime   IVPB 2000 milliGRAM(s) IV Intermittent every 12 hours  chlorhexidine 0.12% Liquid 15 milliLiter(s) Swish and Spit two times a day PRN  chlorhexidine 2% Cloths 1 Application(s) Topical <User Schedule>  cholestyramine Powder (Sugar-Free) 4 Gram(s) Oral daily  enoxaparin Injectable 40 milliGRAM(s) SubCutaneous every 24 hours  EPINEPHrine   1 mG/mL (1:1,000) Topical Solution 1 Application(s) Topical every 24 hours PRN  epoetin matt-epbx (RETACRIT) Injectable 00024 Unit(s) SubCutaneous every 7 days  ergocalciferol 36879 Unit(s) Oral <User Schedule>  gabapentin 100 milliGRAM(s) Oral at bedtime  glucagon  Injectable 1 milliGRAM(s) IntraMuscular once  hydrALAZINE Injectable 10 milliGRAM(s) IV Push every 12 hours  influenza  Vaccine (HIGH DOSE) 0.7 milliLiter(s) IntraMuscular once  levothyroxine Injectable 30 MICROGram(s) IV Push at bedtime  lipid, fat emulsion (Fish Oil and Plant Based) 20% Infusion 0.5319 Gm/kG/Day IV Continuous <Continuous>  lipid, fat emulsion (Fish Oil and Plant Based) 20% Infusion 0.5319 Gm/kG/Day IV Continuous <Continuous>  LORazepam   Injectable 1 milliGRAM(s) IV Push <User Schedule> PRN  melatonin 5 milliGRAM(s) Oral at bedtime  ondansetron Injectable 4 milliGRAM(s) IV Push every 6 hours PRN  Parenteral Nutrition - Adult 1 Each TPN Continuous <Continuous>  Parenteral Nutrition - Adult 1 Each TPN Continuous <Continuous>  Parenteral Nutrition - Adult 1 Each TPN Continuous <Continuous>  ursodiol Suspension 300 milliGRAM(s) Oral every 8 hours  venlafaxine XR. 150 milliGRAM(s) Oral daily      ASSESSMENT/PLAN  78M with history of Crohn's disease, known AF/AFL s/p multiple DCCV in the past, previously on amiodarone, with a prolonged hospital stay following extensive abdominal surgery c/b fistula. EP reconsulted for post-micturition pause + syncope which was likely a vasovagal event.    -no current indication for pacemaker  -would resume beta blocker therapy  -d/w [facility] EP team and patient's outpatient cardiologist.

## 2024-01-22 NOTE — ADVANCED PRACTICE NURSE CONSULT - ASSESSMENT
Called d/t bleeding from denuded area around fista.     Abdomen: stable fistula with thin brown effluent. Large amt of bleeding to denuded area, surgery in to apply silver nitrate and epi soaked gauze.   Ileostomy stable pinpoint opening, +blood clots in pouch.    New Dustin's wound/fistula pouch applied over fistula site. Periwound protected with Cavilon barrier wipe then stoma rings applied to the periwound area, stoma powder over denuded area prior to application of Dustin's pouch. New Coloplast soft convex 1 piece appliance placed over ileostomy.

## 2024-01-22 NOTE — PROGRESS NOTE ADULT - SUBJECTIVE AND OBJECTIVE BOX
Had syncopal episode last night with 10 second pause  Feels slightly SOB this am VS stable Afeb Exam stable

## 2024-01-22 NOTE — PROGRESS NOTE ADULT - SUBJECTIVE AND OBJECTIVE BOX
ON: Patient had syncopal pause of 20 seconds total, with spontaneous return of conscious. He was straining while urinating before tele monitor picked up the pause. Bleeding noted  from ileostomy site.      SUBJECTIVE: Reports improving shortness of breath, denies chest pain, palpitations    MEDICATIONS  (STANDING):  cefepime   IVPB 2000 milliGRAM(s) IV Intermittent every 12 hours  chlorhexidine 2% Cloths 1 Application(s) Topical <User Schedule>  cholestyramine Powder (Sugar-Free) 4 Gram(s) Oral daily  enoxaparin Injectable 40 milliGRAM(s) SubCutaneous every 24 hours  epoetin matt-epbx (RETACRIT) Injectable 42554 Unit(s) SubCutaneous every 7 days  ergocalciferol 94284 Unit(s) Oral <User Schedule>  gabapentin 100 milliGRAM(s) Oral at bedtime  glucagon  Injectable 1 milliGRAM(s) IntraMuscular once  hydrALAZINE Injectable 10 milliGRAM(s) IV Push every 12 hours  labetalol Injectable 10 milliGRAM(s) IV Push once  levothyroxine Injectable 30 MICROGram(s) IV Push at bedtime  lipid, fat emulsion (Fish Oil and Plant Based) 20% Infusion 0.5319 Gm/kG/Day (20.83 mL/Hr) IV Continuous <Continuous>  melatonin 5 milliGRAM(s) Oral at bedtime  Parenteral Nutrition - Adult 1 Each TPN Continuous <Continuous>  ursodiol Suspension 300 milliGRAM(s) Oral every 8 hours  venlafaxine XR. 150 milliGRAM(s) Oral daily    MEDICATIONS  (PRN):  albuterol/ipratropium for Nebulization 3 milliLiter(s) Nebulizer every 6 hours PRN Shortness of Breath and/or Wheezing  chlorhexidine 0.12% Liquid 15 milliLiter(s) Swish and Spit two times a day PRN oral hygeine  EPINEPHrine   1 mG/mL (1:1,000) Topical Solution 1 Application(s) Topical every 24 hours PRN for wound bleeding  LORazepam   Injectable 1 milliGRAM(s) IV Push <User Schedule> PRN Anxiety  ondansetron Injectable 4 milliGRAM(s) IV Push every 6 hours PRN Nausea and/or Vomiting      Drips:     ICU Vital Signs Last 24 Hrs  T(C): 36.4 (22 Jan 2024 06:17), Max: 36.8 (21 Jan 2024 22:21)  T(F): 97.5 (22 Jan 2024 06:17), Max: 98.2 (21 Jan 2024 22:21)  HR: 96 (22 Jan 2024 05:00) (72 - 106)  BP: 136/67 (22 Jan 2024 05:00) (107/59 - 201/90)  BP(mean): 93 (22 Jan 2024 05:00) (74 - 129)  ABP: --  ABP(mean): --  RR: 20 (22 Jan 2024 05:00) (15 - 31)  SpO2: 99% (22 Jan 2024 05:00) (95% - 100%)    O2 Parameters below as of 22 Jan 2024 05:00  Patient On (Oxygen Delivery Method): BiPAP/CPAP    O2 Concentration (%): 40        Physical Exam:  General: Patient is doing well and lying in bed comfortably  Constitutional: alert and awake, A&O x 4  Pulm: no respiratory distress  CV: Regular rate and rhythm, in and out of afib/flutter  Abd: soft, nontender, nondistended. No rebound, no guarding. Wound manager in place, no bleeding, no leaks. soft semi-formed output, brown in color; perc cony to gravity   Extremities: warm, well perfusedpulses      I&O's Summary    20 Jan 2024 07:01  -  21 Jan 2024 07:00  --------------------------------------------------------  IN: 5612.4 mL / OUT: 2960 mL / NET: 2652.4 mL    21 Jan 2024 07:01  -  22 Jan 2024 06:29  --------------------------------------------------------  IN: 3804 mL / OUT: 2300 mL / NET: 1504 mL        LABS:                        8.2    9.22  )-----------( 167      ( 21 Jan 2024 05:30 )             26.2     01-21    138  |  103  |  40<H>  ----------------------------<  126<H>  4.3   |  30  |  1.24    Ca    8.2<L>      21 Jan 2024 05:30  Phos  3.3     01-21  Mg     2.0     01-21    TPro  7.7  /  Alb  2.5<L>  /  TBili  4.9<H>  /  DBili  3.4<H>  /  AST  110<H>  /  ALT  72<H>  /  AlkPhos  110  01-21      Urinalysis Basic - ( 21 Jan 2024 05:30 )    Color: x / Appearance: x / SG: x / pH: x  Gluc: 126 mg/dL / Ketone: x  / Bili: x / Urobili: x   Blood: x / Protein: x / Nitrite: x   Leuk Esterase: x / RBC: x / WBC x   Sq Epi: x / Non Sq Epi: x / Bacteria: x      CAPILLARY BLOOD GLUCOSE        LIVER FUNCTIONS - ( 21 Jan 2024 05:30 )  Alb: 2.5 g/dL / Pro: 7.7 g/dL / ALK PHOS: 110 U/L / ALT: 72 U/L / AST: 110 U/L / GGT: x

## 2024-01-23 NOTE — PROGRESS NOTE ADULT - SUBJECTIVE AND OBJECTIVE BOX
INTERVAL/OVERNIGHT EVENTS: Wound base bleeding was controlled with silver nitrate yesterday. Large volume bloody output controlled. NAEO.    SUBJECTIVE: This AM met in chair w BiPAP mask on. BiPAP taken off for exam and states he feels like "im working hard to take deep breaths." Denies feeling SOB or hypoxic, but requires more effort. No CP. No abd pain for N/V. No more bleeding from wound. Voids without issues    Neurologic Medications  gabapentin 100 milliGRAM(s) Oral at bedtime  LORazepam   Injectable 1 milliGRAM(s) IV Push <User Schedule> PRN Anxiety  melatonin 5 milliGRAM(s) Oral at bedtime  ondansetron Injectable 4 milliGRAM(s) IV Push every 6 hours PRN Nausea and/or Vomiting  venlafaxine XR. 150 milliGRAM(s) Oral daily    Respiratory Medications  albuterol/ipratropium for Nebulization 3 milliLiter(s) Nebulizer every 6 hours PRN Shortness of Breath and/or Wheezing    Cardiovascular Medications  metoprolol tartrate Injectable 10 milliGRAM(s) IV Push every 6 hours    Gastrointestinal Medications  ergocalciferol 15051 Unit(s) Oral <User Schedule>  lipid, fat emulsion (Fish Oil and Plant Based) 20% Infusion 0.5319 Gm/kG/Day IV Continuous <Continuous>  Parenteral Nutrition - Adult 1 Each TPN Continuous <Continuous>  ursodiol Suspension 300 milliGRAM(s) Oral every 8 hours    Hematologic/Oncologic Medications  enoxaparin Injectable 40 milliGRAM(s) SubCutaneous every 24 hours  epoetin matt-epbx (RETACRIT) Injectable 07288 Unit(s) SubCutaneous every 7 days  influenza  Vaccine (HIGH DOSE) 0.7 milliLiter(s) IntraMuscular once    Endocrine/Metabolic Medications  cholestyramine Powder (Sugar-Free) 4 Gram(s) Oral daily  glucagon  Injectable 1 milliGRAM(s) IntraMuscular once  levothyroxine Injectable 30 MICROGram(s) IV Push at bedtime    Topical/Other Medications  chlorhexidine 0.12% Liquid 15 milliLiter(s) Swish and Spit two times a day PRN oral hygeine  chlorhexidine 2% Cloths 1 Application(s) Topical <User Schedule>  EPINEPHrine   1 mG/mL (1:1,000) Topical Solution 1 Application(s) Topical every 24 hours PRN for wound bleeding  timolol 0.5% Solution 1 Drop(s) Both EYES daily    MEDICATIONS  (PRN):  albuterol/ipratropium for Nebulization 3 milliLiter(s) Nebulizer every 6 hours PRN Shortness of Breath and/or Wheezing  chlorhexidine 0.12% Liquid 15 milliLiter(s) Swish and Spit two times a day PRN oral hygeine  EPINEPHrine   1 mG/mL (1:1,000) Topical Solution 1 Application(s) Topical every 24 hours PRN for wound bleeding  LORazepam   Injectable 1 milliGRAM(s) IV Push <User Schedule> PRN Anxiety  ondansetron Injectable 4 milliGRAM(s) IV Push every 6 hours PRN Nausea and/or Vomiting    I&O's Detail    22 Jan 2024 07:01  -  23 Jan 2024 07:00  --------------------------------------------------------  IN:    Fat Emulsion (Fish Oil &amp; Plant Based) 20% Infusion: 249.6 mL    IV PiggyBack: 100 mL    Oral Fluid: 760 mL    TPN (Total Parenteral Nutrition): 2160 mL  Total IN: 3269.6 mL    OUT:    Drain (mL): 25 mL    Drain (mL): 1950 mL    Voided (mL): 2700 mL  Total OUT: 4675 mL    Total NET: -1405.4 mL    23 Jan 2024 07:01  -  23 Jan 2024 08:03  --------------------------------------------------------  IN:    TPN (Total Parenteral Nutrition): 139 mL  Total IN: 139 mL    OUT:    Fat Emulsion (Fish Oil &amp; Plant Based) 20% Infusion: 0 mL  Total OUT: 0 mL    Total NET: 139 mL    Vital Signs Last 24 Hrs  T(C): 36.1 (23 Jan 2024 05:22), Max: 36.8 (22 Jan 2024 17:31)  T(F): 97 (23 Jan 2024 05:22), Max: 98.2 (22 Jan 2024 17:31)  HR: 110 (23 Jan 2024 07:00) (93 - 122)  BP: 143/73 (23 Jan 2024 07:00) (108/55 - 176/83)  BP(mean): 101 (23 Jan 2024 07:00) (75 - 119)  RR: 24 (23 Jan 2024 07:00) (18 - 29)  SpO2: 97% (23 Jan 2024 07:00) (94% - 100%)    Parameters below as of 23 Jan 2024 07:00  Patient On (Oxygen Delivery Method): BiPAP/CPAP    O2 Concentration (%): 40    GENERAL: NAD, resting comfortably in chair, AxOx3  HEENT: NCAT, MMM, jaundiced  C/V: Normal rate, normal peripheral perfusion, no murmurs  PULM: Nonlabored breathing, no respiratory distress, CTA anteriorly w decreased sounds on R side, fine crackles at base  ABD: Soft, ND, NT, no rebound tenderness, no guarding, wound manager and ostomy in place, no blood in bags.   EXTREM: WWP, dependent edema and UE edema, SCDs in place  NEURO: No focal deficits    LABS:                        6.9    9.25  )-----------( 147      ( 23 Jan 2024 05:30 )             22.4     01-23    137  |  103  |  39<H>  ----------------------------<  142<H>  4.2   |  29  |  1.22    Ca    7.7<L>      23 Jan 2024 05:30  Phos  2.9     01-23  Mg     2.0     01-23    TPro  7.3  /  Alb  1.9<L>  /  TBili  4.4<H>  /  DBili  2.9<H>  /  AST  103<H>  /  ALT  64<H>  /  AlkPhos  101  01-23    PT/INR - ( 22 Jan 2024 09:01 )   PT: 14.6 sec;   INR: 1.29        PTT - ( 22 Jan 2024 09:01 )  PTT:38.4 sec  Urinalysis Basic - ( 23 Jan 2024 05:30 )    Color: x / Appearance: x / SG: x / pH: x  Gluc: 142 mg/dL / Ketone: x  / Bili: x / Urobili: x   Blood: x / Protein: x / Nitrite: x   Leuk Esterase: x / RBC: x / WBC x   Sq Epi: x / Non Sq Epi: x / Bacteria: x

## 2024-01-23 NOTE — PROGRESS NOTE ADULT - SUBJECTIVE AND OBJECTIVE BOX
INTERVAL EVENTS: No further pauses on tele. He feels well. Got 1 uPRBC this AM. No chest pain/ palpitations. His breathing is better today     PAST MEDICAL & SURGICAL HISTORY:  Essential hypertension  Hypertension    Atrial fibrillation  s/p cardioversion 2010 and 2014  Pt. reports 4 DCCV  Now on Amiodarone 200mg PO bid and Eliquis 5mg PO bid  Last DCCV 4 yrs ago at [facility]    Crohn's disease  s/p partial resection of ileum    Hyperlipidemia    Hypothyroidism    ELVI (acute kidney injury)  No hx of CKD   Creatinine 1.5 on this admisison 4/5/22    History of depression  On Venlafaxine ER 150mg PO bid    Junctional rhythm    Other specified personal history presenting hazards to health  History of bowel resection    H/O knee surgery    History of cataract surgery        MEDICATIONS  (STANDING):  chlorhexidine 2% Cloths 1 Application(s) Topical <User Schedule>  cholestyramine Powder (Sugar-Free) 4 Gram(s) Oral daily  enoxaparin Injectable 40 milliGRAM(s) SubCutaneous every 24 hours  epoetin matt-epbx (RETACRIT) Injectable 56632 Unit(s) SubCutaneous every 7 days  ergocalciferol 40212 Unit(s) Oral <User Schedule>  gabapentin 100 milliGRAM(s) Oral at bedtime  glucagon  Injectable 1 milliGRAM(s) IntraMuscular once  levothyroxine Injectable 30 MICROGram(s) IV Push at bedtime  lipid, fat emulsion (Fish Oil and Plant Based) 20% Infusion 0.5319 Gm/kG/Day (20.83 mL/Hr) IV Continuous <Continuous>  melatonin 5 milliGRAM(s) Oral at bedtime  metoprolol tartrate Injectable 10 milliGRAM(s) IV Push every 6 hours  Parenteral Nutrition - Adult 1 Each TPN Continuous <Continuous>  Parenteral Nutrition - Adult 1 Each TPN Continuous <Continuous>  timolol 0.5% Solution 1 Drop(s) Both EYES daily  ursodiol Suspension 300 milliGRAM(s) Oral every 8 hours  venlafaxine XR. 150 milliGRAM(s) Oral daily    MEDICATIONS  (PRN):  chlorhexidine 0.12% Liquid 15 milliLiter(s) Swish and Spit two times a day PRN oral hygeine  EPINEPHrine   1 mG/mL (1:1,000) Topical Solution 1 Application(s) Topical every 24 hours PRN for wound bleeding  LORazepam   Injectable 1 milliGRAM(s) IV Push <User Schedule> PRN Anxiety  ondansetron Injectable 4 milliGRAM(s) IV Push every 6 hours PRN Nausea and/or Vomiting    Vital Signs Last 24 Hrs  T(C): 36.4 (23 Jan 2024 13:08), Max: 37 (23 Jan 2024 08:30)  T(F): 97.6 (23 Jan 2024 13:08), Max: 98.6 (23 Jan 2024 08:30)  HR: 101 (23 Jan 2024 13:00) (90 - 115)  BP: 131/76 (23 Jan 2024 13:00) (108/55 - 176/83)  BP(mean): 94 (23 Jan 2024 13:00) (75 - 119)  RR: 26 (23 Jan 2024 13:00) (15 - 29)  SpO2: 100% (23 Jan 2024 13:00) (94% - 100%)    Parameters below as of 23 Jan 2024 13:00  Patient On (Oxygen Delivery Method): room air        PHYSICAL EXAM:  GEN: Awake, alert. NAD.   HEENT: NCAT, PERRL, EOMI. Mucosa moist. No JVD.  RESP: CTA b/l  CV: RRR. Normal S1/S2. No m/r/g.  ABD: Soft.   EXT: Warm. No edema, clubbing, or cyanosis.     LABS:                        6.9    9.25  )-----------( 147      ( 23 Jan 2024 05:30 )             22.4     01-23    137  |  103  |  39<H>  ----------------------------<  142<H>  4.2   |  29  |  1.22    Ca    7.7<L>      23 Jan 2024 05:30  Phos  2.9     01-23  Mg     2.0     01-23    TPro  7.3  /  Alb  1.9<L>  /  TBili  4.4<H>  /  DBili  2.9<H>  /  AST  103<H>  /  ALT  64<H>  /  AlkPhos  101  01-23        PT/INR - ( 22 Jan 2024 09:01 )   PT: 14.6 sec;   INR: 1.29          PTT - ( 22 Jan 2024 09:01 )  PTT:38.4 sec  Urinalysis Basic - ( 23 Jan 2024 05:30 )    Color: x / Appearance: x / SG: x / pH: x  Gluc: 142 mg/dL / Ketone: x  / Bili: x / Urobili: x   Blood: x / Protein: x / Nitrite: x   Leuk Esterase: x / RBC: x / WBC x   Sq Epi: x / Non Sq Epi: x / Bacteria: x      I&O's Summary    22 Jan 2024 07:01  -  23 Jan 2024 07:00  --------------------------------------------------------  IN: 3269.6 mL / OUT: 4675 mL / NET: -1405.4 mL    23 Jan 2024 07:01  -  23 Jan 2024 14:26  --------------------------------------------------------  IN: 439 mL / OUT: 625 mL / NET: -186 mL      RADIOLOGY & ADDITIONAL STUDIES:  TELEMETRY:  EKG:

## 2024-01-23 NOTE — PROGRESS NOTE ADULT - ASSESSMENT
78M w PMH Crohn's, AFib/Flutter s/p DCCVs on amiodarone, remote ileocectomy and open appendectomy. Admitted (6/23) for SBO vs Crohns flare, s/p NGT decompression and s/p lap converted to open TRE, SBR x 3, left in discontinuity with abthera vac on (6/27), RTOR for ileocolic resection, small bowel anastomosis, and abdominal wall closure on (6/28), c/b fluid collection s/p IR aspiration of perihepatic fluid on (7/3), c/b wound dehiscence s/p RTOR exlap, washout, ileocolic resection with end ileostomy, blow hole colostomy, red rubber from ileostomy to small bowel anastomosis; vicryl bridging mesh on (7/5) transferred to SICU postoperatively for hemodynamic monitoring, with hospital course complicated by periods of severe ELVI and hyponatremia, which resolved but stepped back up for to SICU on (9/10) for acute AMS, intermittent hypoglycemia, AFib with RVR. Percutaneous Cholecystostomy placed on (9/11) for enlarged GB, PCT capped 10/5 and uncapped 10/25 for hyperbilirubinemia, Right brachial DVT, left basillic and cephalic superficial thrombus on duplex 11/2, CT 11/14 with ALDEN ground glass opacity of unclear etiology, completed empiric 7day cefepime course 11/22, rising T-bili of unclear etilogy, now with resolved candida glabrata fungemia.     NEURO: Hx of depression: cont Effexor (halved dose in setting of renal dysfunx). Anxiety: Lorazepam PRN. Holistic consult for anxiety and insomnia. Gabapentin for restless leg syndrome.   HEENT: Timolol eye gtt added on 1/10 for additional systemic BB support in setting of malabsorption  CV: Hx of Afib and recent Aflutter alternating with sinus tachycardia (HR 100s): Cont Lopressor to 10 q6h, added timolol eye ggt for attempted systemic affect. Started therapeutic Lovenox on 1/10, but held since 1/15 in setting of dark red output from ileostomy. s/p previous DCCV, off Amiodarone and Lipitor due to persistent transaminitis. BLE duplex (1/5) no DVT, TTE  (7/18) - PASP 64mmHg, EF 65-70%. holding Losartan & norvasc because not absorbing PO meds, TTE (1/4) LVEF 75%, mild/mod AR, PASP 40, RVEF wnl. 2 liters of fluid in TPN to match I/O. Stopped Hydralazine.  PULM: Atelectasis/dyspnea improved clinically: cont 1 hr of BiPAP every 12hrs and nasal canula or room air. PRN duonebs for wheezing. Encourage OOB and IS. CT from 11/14 with ALDEN ground glass opacity of unclear etiology. COVID negative. RVP negative. Completed 7d empiric cefepime 11/22 and 1/22 to cover for potential PNA.  GI/FEN: Low res, low fat, high protein diet. Cont Ensure max x1/day however pt likely not absorbing adequately due to proximal fistula. Folate, thiamine. PICC replaced 12/4, switched out PICC on 1/4. Continue TPN/lipids, 2 liters in TPN in order to make I/O even. 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 cont Octreotide & Cholestyramine 10/18. Transaminitis slowly improved, elevated T bili, s/p Perc Deb on 9/11, uncapped on 1/15, will recap perc dbe today (11/17). been seen by Hepatology - MRCP 10/30 no obstruction, cont ursodiol, RUQ US 11/25 CBD 5mm, hepatic vessels patent. Cont Vit D weekly.   : Voids. ELVI improved: stopped famotidine given renal dysfunction. MARLO Duplex and RP US unremarkable, CK wnl.    ENDO: Hx hypothyroid: IV Synthroid,  Repeat thyroid studies WNL 1/3/24.   ID: Leukocytosis, possible PNA, Cefepime (1/15--) // BCx 11/22 grew candida glabrata, likely CLABSI. s/p Caspo & completed Fluconazole course (12/1-12/9). Ophthalmology evaluated - normal ocular exam. Echo no vegetation. PICC replaced on 12/4. Cont Nystatin powder. // DC: caspofungin (11/24-12/1). empiric 7days cefepime (11/15-11/22), C. tertium, Lactobacillus from IR cx 7/3, and candida albicans, lactobacillus, vanc sensitive E faecium, vanc resistant E gallinarum, vanc resistant E casseliflavus, lactobacillus from OR cx 7/5. Completed course of abx with Imipenem (9/10-9/15). Imipenem (8/26--) imipenem (6/30-7/12, 7/23-7/30), Dapto (6/30-7/5 and 7/23-7/24). Empiric Dapto (8/23-24) and Cefepime (8/23-24).   PPX: SCDs, held therapeutic Lovenox for Afib in setting of dark blood from ileostomy, but will restart PPX lovenox given hx of thrombosis.   HEME: Anemia - Epogen weekly. Acute blood loss anemia due to wound bleed, hg 6.9--gave 1 U pRBC today.  LINES: PIVs, RUE PICC placed (1/5--) will plan to switch PICC once a month in setting of fungemia history // DC: LUE PICC (9/1-11/1 ), RUE PICC: (11/1-11/24), LUE PICC (12/4-1/5)  WOUNDS/DRAINS: Abdominal wound and fistula with wound manager in place dressing change daily. Had recurrent punctate bleeding at wound bed, s/p placed surgicel, attempt to stitch, electrocautery and epi soaked gauze. Cont epinephrine soaked gauze at bedside as needed for wound bleeding. RLQ Ostomy. IR perc deb tube uncapped 1/15-1/17 // DC: L Clay drain.  PT: Ambulate as tolerated OOB to chair daily.  DISPO: SICU due to complicated hospital course. but will downgrade labs to every other day.

## 2024-01-23 NOTE — PROGRESS NOTE ADULT - ATTENDING COMMENTS
UC, AF, ileocolic resection, ileostomy, cholecystostomy, anemia  physical as above  still with intermittent bleeding from wound  continue SQ heparin for now  TPN  continue metoprolol; no recurrence of vagal event

## 2024-01-23 NOTE — PROGRESS NOTE ADULT - ASSESSMENT
The patient is a 78 year old male with a PMHx of Crohn's, AFib/Flutter s/p DCCVs on amiodarone, remote ileocectomy and open appendectomy. Admitted (6/23) for SBO vs Crohns flare, s/p NGT decompression and s/p lap converted to open TRE, SBR x 3, left in discontinuity with abthera vac on (6/27), RTOR for ileocolic resection, small bowel anastomosis, and abdominal wall closure on (6/28), c/b fluid collection s/p IR aspiration of perihepatic fluid on (7/3), c/b wound dehiscence s/p RTOR exlap, washout, ileocolic resection with end ileostomy, blow hole colostomy, red rubber from ileostomy to small bowel anastomosis; vicryl bridging mesh on (7/5) transferred to SICU postoperatively for hemodynamic monitoring, with hospital course complicated by periods of severe ELVI and hyponatremia, which resolved but stepped back up for to SICU on (9/10) for acute AMS, intermittent hypoglycemia, AFib with RVR. Percutaneous Cholecystostomy placed on (9/11) for enlarged GB, PCT capped 10/5 and uncapped 10/25 for hyperbilirubinemia, Right brachial DVT, left basillic and cephalic superficial thrombus on duplex 11/2, CT 11/14 with ALDEN ground glass opacity of unclear etiology, completed empiric 7day cefepime course 11/22, rising T-bili of unclear etiology now w resolved candida glabrata fungemia, ELVI improving.     1u pRBC  Reg Diet  Pain/nausea control prn  Cefepime, last day today  BiPAP q4hprn  Wound manager changes daily with wound care  C/W octreotide, cholestyramine  C/W Epogen weekly  SQL/SCDs/OOBA/IS  Rest of care per SICU

## 2024-01-23 NOTE — PROGRESS NOTE ADULT - SUBJECTIVE AND OBJECTIVE BOX
ON: patient reports some shortness of breath, no further episodes of bleeding from wound site since yesterday.     MEDICATIONS  (STANDING):  chlorhexidine 2% Cloths 1 Application(s) Topical <User Schedule>  cholestyramine Powder (Sugar-Free) 4 Gram(s) Oral daily  enoxaparin Injectable 40 milliGRAM(s) SubCutaneous every 24 hours  epoetin matt-epbx (RETACRIT) Injectable 42404 Unit(s) SubCutaneous every 7 days  ergocalciferol 61294 Unit(s) Oral <User Schedule>  gabapentin 100 milliGRAM(s) Oral at bedtime  glucagon  Injectable 1 milliGRAM(s) IntraMuscular once  influenza  Vaccine (HIGH DOSE) 0.7 milliLiter(s) IntraMuscular once  levothyroxine Injectable 30 MICROGram(s) IV Push at bedtime  lipid, fat emulsion (Fish Oil and Plant Based) 20% Infusion 0.5319 Gm/kG/Day (20.83 mL/Hr) IV Continuous <Continuous>  melatonin 5 milliGRAM(s) Oral at bedtime  metoprolol tartrate Injectable 10 milliGRAM(s) IV Push every 6 hours  Parenteral Nutrition - Adult 1 Each TPN Continuous <Continuous>  timolol 0.5% Solution 1 Drop(s) Both EYES daily  ursodiol Suspension 300 milliGRAM(s) Oral every 8 hours  venlafaxine XR. 150 milliGRAM(s) Oral daily    MEDICATIONS  (PRN):  albuterol/ipratropium for Nebulization 3 milliLiter(s) Nebulizer every 6 hours PRN Shortness of Breath and/or Wheezing  chlorhexidine 0.12% Liquid 15 milliLiter(s) Swish and Spit two times a day PRN oral hygeine  EPINEPHrine   1 mG/mL (1:1,000) Topical Solution 1 Application(s) Topical every 24 hours PRN for wound bleeding  LORazepam   Injectable 1 milliGRAM(s) IV Push <User Schedule> PRN Anxiety  ondansetron Injectable 4 milliGRAM(s) IV Push every 6 hours PRN Nausea and/or Vomiting      Drips:     ICU Vital Signs Last 24 Hrs  T(C): 36.1 (23 Jan 2024 05:22), Max: 36.8 (22 Jan 2024 17:31)  T(F): 97 (23 Jan 2024 05:22), Max: 98.2 (22 Jan 2024 17:31)  HR: 93 (23 Jan 2024 06:00) (93 - 122)  BP: 108/55 (23 Jan 2024 06:00) (108/55 - 176/83)  BP(mean): 75 (23 Jan 2024 06:00) (75 - 119)  ABP: --  ABP(mean): --  RR: 19 (23 Jan 2024 06:00) (18 - 29)  SpO2: 100% (23 Jan 2024 06:00) (94% - 100%)    O2 Parameters below as of 23 Jan 2024 06:00  Patient On (Oxygen Delivery Method): BiPAP/CPAP    O2 Concentration (%): 40        Physical Exam:  General: Patient is doing well and lying in bed comfortably  Constitutional: alert and awake, A&O x 4  Pulm: no respiratory distress  CV: Regular rate and rhythm, in and out of afib/flutter  Abd: soft, nontender, nondistended. No rebound, no guarding. Wound manager in place, no bleeding, no leaks. soft semi-formed output, brown in color; perc cony to gravity   Extremities: warm, well perfusedpulses      Vent settings:      I&O's Summary    22 Jan 2024 07:01  -  23 Jan 2024 07:00  --------------------------------------------------------  IN: 2970.8 mL / OUT: 4400 mL / NET: -1429.2 mL        LABS:                        6.9    9.25  )-----------( 147      ( 23 Jan 2024 05:30 )             22.4     01-23    137  |  103  |  39<H>  ----------------------------<  142<H>  4.2   |  29  |  1.22    Ca    7.7<L>      23 Jan 2024 05:30  Phos  2.9     01-23  Mg     2.0     01-23    TPro  7.3  /  Alb  1.9<L>  /  TBili  4.4<H>  /  DBili  2.9<H>  /  AST  103<H>  /  ALT  64<H>  /  AlkPhos  101  01-23    PT/INR - ( 22 Jan 2024 09:01 )   PT: 14.6 sec;   INR: 1.29          PTT - ( 22 Jan 2024 09:01 )  PTT:38.4 sec  Urinalysis Basic - ( 23 Jan 2024 05:30 )    Color: x / Appearance: x / SG: x / pH: x  Gluc: 142 mg/dL / Ketone: x  / Bili: x / Urobili: x   Blood: x / Protein: x / Nitrite: x   Leuk Esterase: x / RBC: x / WBC x   Sq Epi: x / Non Sq Epi: x / Bacteria: x      CAPILLARY BLOOD GLUCOSE        LIVER FUNCTIONS - ( 23 Jan 2024 05:30 )  Alb: 1.9 g/dL / Pro: 7.3 g/dL / ALK PHOS: 101 U/L / ALT: 64 U/L / AST: 103 U/L / GGT: x

## 2024-01-23 NOTE — PROGRESS NOTE ADULT - SUBJECTIVE AND OBJECTIVE BOX
SUBJECTIVE: Pt seen and examined at bedside this am by surgery team. Patient is lying comfortably in bed, on BiPAP (for overnight). Tolerating diet, pain well controlled with current regimen. Patient denies fever, nausea, vomiting, chest pain, and shortness of breath.    MEDICATIONS  (STANDING):  chlorhexidine 2% Cloths 1 Application(s) Topical <User Schedule>  cholestyramine Powder (Sugar-Free) 4 Gram(s) Oral daily  enoxaparin Injectable 40 milliGRAM(s) SubCutaneous every 24 hours  epoetin matt-epbx (RETACRIT) Injectable 06725 Unit(s) SubCutaneous every 7 days  ergocalciferol 54481 Unit(s) Oral <User Schedule>  gabapentin 100 milliGRAM(s) Oral at bedtime  glucagon  Injectable 1 milliGRAM(s) IntraMuscular once  influenza  Vaccine (HIGH DOSE) 0.7 milliLiter(s) IntraMuscular once  levothyroxine Injectable 30 MICROGram(s) IV Push at bedtime  lipid, fat emulsion (Fish Oil and Plant Based) 20% Infusion 0.5319 Gm/kG/Day (20.83 mL/Hr) IV Continuous <Continuous>  lipid, fat emulsion (Fish Oil and Plant Based) 20% Infusion 0.5319 Gm/kG/Day (20.83 mL/Hr) IV Continuous <Continuous>  melatonin 5 milliGRAM(s) Oral at bedtime  metoprolol tartrate Injectable 10 milliGRAM(s) IV Push every 6 hours  Parenteral Nutrition - Adult 1 Each TPN Continuous <Continuous>  Parenteral Nutrition - Adult 1 Each TPN Continuous <Continuous>  timolol 0.5% Solution 1 Drop(s) Both EYES daily  ursodiol Suspension 300 milliGRAM(s) Oral every 8 hours  venlafaxine XR. 150 milliGRAM(s) Oral daily    MEDICATIONS  (PRN):  albuterol/ipratropium for Nebulization 3 milliLiter(s) Nebulizer every 6 hours PRN Shortness of Breath and/or Wheezing  chlorhexidine 0.12% Liquid 15 milliLiter(s) Swish and Spit two times a day PRN oral hygeine  EPINEPHrine   1 mG/mL (1:1,000) Topical Solution 1 Application(s) Topical every 24 hours PRN for wound bleeding  LORazepam   Injectable 1 milliGRAM(s) IV Push <User Schedule> PRN Anxiety  ondansetron Injectable 4 milliGRAM(s) IV Push every 6 hours PRN Nausea and/or Vomiting      Vital Signs Last 24 Hrs  T(C): 36.1 (23 Jan 2024 05:22), Max: 36.8 (22 Jan 2024 17:31)  T(F): 97 (23 Jan 2024 05:22), Max: 98.2 (22 Jan 2024 17:31)  HR: 93 (23 Jan 2024 06:00) (93 - 122)  BP: 108/55 (23 Jan 2024 06:00) (108/55 - 176/83)  BP(mean): 75 (23 Jan 2024 06:00) (75 - 119)  RR: 19 (23 Jan 2024 06:00) (18 - 29)  SpO2: 100% (23 Jan 2024 06:00) (94% - 100%)    Parameters below as of 23 Jan 2024 06:00  Patient On (Oxygen Delivery Method): BiPAP/CPAP    O2 Concentration (%): 40    Physical Exam  General: Patient is doing well and sitting in bed comfortably  Constitutional: alert and oriented   Pulm: On BiPAP (intermittent), no respiratory distress  CV: Regular rate  Abd: soft, nontender, nondistended. No rebound, no guarding. Wound manager in place, no further bleeding, no leaks. soft semi-formed output, brown in color; Perc cony capped; Ileostomy with minimal output  Extremities: warm, well perfused    I&O's Detail    22 Jan 2024 07:01  -  23 Jan 2024 07:00  --------------------------------------------------------  IN:    Fat Emulsion (Fish Oil &amp; Plant Based) 20% Infusion: 228.8 mL    IV PiggyBack: 100 mL    Oral Fluid: 760 mL    TPN (Total Parenteral Nutrition): 1882 mL  Total IN: 2970.8 mL    OUT:    Drain (mL): 1800 mL    Voided (mL): 2600 mL  Total OUT: 4400 mL    Total NET: -1429.2 mL        LABS:                        6.9    9.25  )-----------( 147      ( 23 Jan 2024 05:30 )             22.4     01-23    137  |  103  |  39<H>  ----------------------------<  142<H>  4.2   |  29  |  1.22    Ca    7.7<L>      23 Jan 2024 05:30  Phos  2.9     01-23  Mg     2.0     01-23    TPro  7.3  /  Alb  1.9<L>  /  TBili  4.4<H>  /  DBili  2.9<H>  /  AST  103<H>  /  ALT  64<H>  /  AlkPhos  101  01-23    PT/INR - ( 22 Jan 2024 09:01 )   PT: 14.6 sec;   INR: 1.29          PTT - ( 22 Jan 2024 09:01 )  PTT:38.4 sec  Urinalysis Basic - ( 23 Jan 2024 05:30 )    Color: x / Appearance: x / SG: x / pH: x  Gluc: 142 mg/dL / Ketone: x  / Bili: x / Urobili: x   Blood: x / Protein: x / Nitrite: x   Leuk Esterase: x / RBC: x / WBC x   Sq Epi: x / Non Sq Epi: x / Bacteria: x

## 2024-01-23 NOTE — PROGRESS NOTE ADULT - ASSESSMENT
78 year old male with a PMHx of Crohn's, AFib/Flutter s/p DCCVs on amiodarone, remote ileocectomy and open appendectomy. Admitted (6/23) for SBO vs Crohns flare, s/p NGT decompression and s/p lap converted to open TRE, SBR x 3, left in discontinuity with abthera vac on (6/27), RTOR for ileocolic resection, small bowel anastomosis, and abdominal wall closure on (6/28), c/b fluid collection s/p IR aspiration of perihepatic fluid on (7/3), c/b wound dehiscence s/p RTOR exlap, washout, ileocolic resection with end ileostomy, blow hole colostomy, red rubber from ileostomy to small bowel anastomosis; vicryl bridging mesh on (7/5) transferred to SICU postoperatively for hemodynamic monitoring, with hospital course complicated by periods of severe ELVI and hyponatremia, which resolved but stepped back up for to SICU on (9/10) for acute AMS, intermittent hypoglycemia, AFib with RVR. Percutaneous Cholecystostomy placed on (9/11) for enlarged GB, PCT capped 10/5 and uncapped 10/25 for hyperbilirubinemia, Right brachial DVT, left basillic and cephalic superficial thrombus on duplex 11/2, CT 11/14 with ALDEN ground glass opacity of unclear etiology, completed empiric 7day cefepime course 11/22, rising T-bili of unclear etiology now w resolved candida glabrata fungemia, ELVI improving. No further episodes of syncope, noted Hb drop to 6.9    - Transfuse 1 PRBC  - Continue TPN  - Gattex   - Team 5 will follow  - Plan discussed with Dr Peterson

## 2024-01-23 NOTE — PROGRESS NOTE ADULT - ASSESSMENT
78M w/ PMHx of Crohn's, HTN, HLD, AFib/Flutter s/p multiple DCCV (on Amio)s/p ILR, Gout, PSHx R IHR, ileocecectomy, who has been here for prolonged hospital course since 6/2022, initially for open TRE for Crohn's flare c/b enterocutaneous fistula, wound dehiscence, and fungemia. Followed by EP for Afib/ A flutter management and cardiology called today for episode of sinus pause in the setting of vasovagal syncope.    Review of Studies:  Tele: Afib 's. No further pauses   TTE 1/04:  Hyperdynamic left ventricular systolic function, ,moderate symmetric left ventricular hypertrophy, Grade II DD, normal RV function, normal atria, PASP 40mmHg, no pericardial effusion     #Vasovagal syncope leading to sinus arrest. Now resolved   #Afib/ Aflutter  - No further pauses. His sinus pause was in the setting of vasovagal event ( straining to urinate). Denies any other symptoms leading upto events.   - Seen by EP. Given vasovagal event correlating to the event and no further pauses, resumed metoprolol 10mg q6h ( with holding parameters- hold for SBP<90 AND HR <60) and timolol 0.5%   - Will check how HR does on metoprolol. If remains elevated, will consider dig vs cardizem   - pro BNP 1231. Lasix IV 20mg PRN   - CHADVASC atleast 3, but unable to tolerate AC at this time given multiple active issues  - replete electrolytes for goal K>4. Mg>2, Ph>3    Cardiology to follow.

## 2024-01-23 NOTE — PROGRESS NOTE ADULT - ATTENDING COMMENTS
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 No pauses noted   - TTE 1/04/2024:  Hyperdynamic left ventricular systolic function, ,moderate symmetric left ventricular hypertrophy, Grade II DD, normal RV function, normal atria, PASP 40mmHg, no pericardial effusion     #Vasovagal syncope leading to sinus arrest. Now resolved   #Afib/ Aflutter  - Patient with known pAfib/Flutter, chronically on Amio, now rate controlled on Lopressor/Timolol with Sinus arrest on 1/22  - Patient reports that he was straining to urinate after which he recalls waking up to ''being resuscitated''  - Review of Tele shows AFib that progressively slowed with a long pause with ventricular escape. Pause lasted about 15 seconds after which patient reverted back into Afib with rates in the 100-110's.  - Tele today shows rate controlled Afib with average rate in the 90's  - At this time suspect arrest from profound vasovagal episode in the setting of straining. No further pause noted on tele since   - Cont with metoprolol 10mg q6h ( with holding parameters- hold for SBP<90 AND HR <60) as wel as  timolol 0.5%   - EP following   - Will continue to monitor Standing HR on that regimen and consider other agents as needed  - CHADVASC of at least 3, however patient unable to tolerate AC at this time. Will continue to defer for now. Hg this am at 6.9 requiring Blood transfusion  - Please keep K>4. Mg>2  - Cardiology will follow with you, please call with any questions .

## 2024-01-24 NOTE — CHART NOTE - NSCHARTNOTEFT_GEN_A_CORE
Admitting Diagnosis:   Patient is a 78y old  Male who presents with a chief complaint of SBO (23 Jan 2024 08:02)      PAST MEDICAL & SURGICAL HISTORY:  Essential hypertension  Hypertension      Atrial fibrillation  s/p cardioversion 2010 and 2014  Pt. reports 4 DCCV  Now on Amiodarone 200mg PO bid and Eliquis 5mg PO bid  Last DCCV 4 yrs ago at [facility]      Crohn's disease  s/p partial resection of ileum      Hyperlipidemia      Hypothyroidism      History of depression  On Venlafaxine ER 150mg PO bid      Junctional rhythm      H/O knee surgery      History of cataract surgery          Current Nutrition Order: TPN via PICC- 3L bag running over 14hrs, providing 380g Dex, 120g AA, 50g SMOF lipids daily; provides 2272 kcals, 120g protein daily, GIR 4.56 mg/kg/min   PO- Regular diet + 2 LPS per day [provide 100 kcals, 15g protein each], + Ensure Max daily [150 kcals, 20g protein]    PO Intake: Good (%) [ X  ]  Fair (50-75%) [   ] Poor (<25%) [   ]    GI Issues:   1/24/24: ileostomy output 25cc x 24hrs, abdominal wound/fistula output 1925cc x 24hrs, per cony output 0cc x 24hrs [capped 1/2, uncapped 1/15, recapped 1/17]  1/19/24: ileostomy output 25cc x 24hrs, abdominal wound/fistula output 1700cc x 24hrs, per cony output 0cc x 24hrs [capped 1/2, uncapped 1/15, recapped 1/17]  1/16/24: ileostomy output 60cc x 24hrs, abdominal wound/fistula output 1375cc x 24hrs, per cony output 1025cc x 24hrs [capped 1/2, uncapped 1/15]  1/11/24: ileostomy output 0cc x 24hrs, abdominal wound/fistula output 1750cc x 24hrs, per cony output 0cc x 24hrs [capped 1/2]  1/8/24: noted brownish output from [id number], total amount not documented; abdominal wound/fistula output 2285cc x 24hrs, per cony output 0cc x 24hrs [capped 1/2]  1/3/24:  ileostomy output 0cc x 24hrs, abdominal wound/fistula output 2550cc x 24hrs, per cony output 275cc x 24hrs  12/29: ileostomy output 50cc x 24hrs, abdominal wound/fistula output 910cc x 24hrs, per cony output 1225cc x 24hrs  12/26: ileostomy output 50cc x 24hrs, abdominal wound/fistula output 1175cc x 24hrs, per cony output 1050cc x 24hrs  12/21: ileostomy output 50cc x 24hrs, abdominal wound/fistula output 1100cc x 24hrs, per cnoy output 1050cc x 24hrs     12/19: ileostomy output 20cc x 24hrs, abdominal wound/fistula output 1275cc x 24hrs, per cony output 775cc x 24hrs    12/14: ileostomy output 15cc x 24hrs, abdominal wound/fistula output  1230cc x 24hrs, per cony output 1010cc x 24hrs    12/12: ileostomy output 75cc x 24hrs, abdominal wound/fistula output  600cc x 24hrs, per cony output 1125cc x 24hrs    12/5: ileostomy output 35cc x 24hrs, abdominal wound/fistula output  250cc x 24hrs, per cony output 745cc x 24hrs    12/1: ileostomy output 30cc x 24hrs, abdominal wound/fistula output  2400cc x 24hrs, per cony output 550cc x 24hrs   11/27: ileostomy output 50 cc x 24hrs, abdominal wound/fistula output 2175 cc x 24hrs, per cony output 530 cc x 24hrs   11/22: ileostomy output 25cc x 24hrs, abdominal wound/fistula output 2350 cc x 24hrs, per cony output 775cc x 24hrs     Pain: no pain/discomfort noted    Skin Integrity: abd wound and fistula with wound manager in place, RLQ ileostomy, perc cony drain [now capped]. Rudy score 20    I&Os:   01-23-24 @ 07:01  -  01-24-24 @ 07:00  --------------------------------------------------------  IN: 2794.4 mL / OUT: 2950 mL / NET: -155.6 mL    01-24-24 @ 07:01  -  01-24-24 @ 16:14  --------------------------------------------------------  IN: 1162 mL / OUT: 1645 mL / NET: -483 mL        Labs:   01-23    137  |  103  |  39<H>  ----------------------------<  142<H>  4.2   |  29  |  1.22    Ca    7.7<L>      23 Jan 2024 05:30  Phos  2.9     01-23  Mg     2.0     01-23    TPro  7.3  /  Alb  1.9<L>  /  TBili  4.4<H>  /  DBili  2.9<H>  /  AST  103<H>  /  ALT  64<H>  /  AlkPhos  101  01-23    CAPILLARY BLOOD GLUCOSE        Medications:  MEDICATIONS  (STANDING):  albumin human 25% IVPB 50 milliLiter(s) IV Intermittent every 8 hours  chlorhexidine 2% Cloths 1 Application(s) Topical <User Schedule>  cholestyramine Powder (Sugar-Free) 4 Gram(s) Oral daily  enoxaparin Injectable 40 milliGRAM(s) SubCutaneous every 24 hours  epoetin matt-epbx (RETACRIT) Injectable 57744 Unit(s) SubCutaneous every 7 days  ergocalciferol 12522 Unit(s) Oral <User Schedule>  gabapentin 100 milliGRAM(s) Oral at bedtime  glucagon  Injectable 1 milliGRAM(s) IntraMuscular once  levothyroxine Injectable 30 MICROGram(s) IV Push at bedtime  lipid, fat emulsion (Fish Oil and Plant Based) 20% Infusion 0.5319 Gm/kG/Day (20.83 mL/Hr) IV Continuous <Continuous>  melatonin 5 milliGRAM(s) Oral at bedtime  metoprolol tartrate Injectable 10 milliGRAM(s) IV Push every 6 hours  Parenteral Nutrition - Adult 1 Each TPN Continuous <Continuous>  Parenteral Nutrition - Adult 1 Each TPN Continuous <Continuous>  teduglutide Injectable 2.35 milliGRAM(s) SubCutaneous every 24 hours  timolol 0.5% Solution 1 Drop(s) Both EYES daily  ursodiol Suspension 300 milliGRAM(s) Oral every 8 hours  venlafaxine XR. 150 milliGRAM(s) Oral daily    MEDICATIONS  (PRN):  chlorhexidine 0.12% Liquid 15 milliLiter(s) Swish and Spit two times a day PRN oral hygeine  EPINEPHrine   1 mG/mL (1:1,000) Topical Solution 1 Application(s) Topical every 24 hours PRN for wound bleeding  LORazepam   Injectable 1 milliGRAM(s) IV Push <User Schedule> PRN Anxiety  ondansetron Injectable 4 milliGRAM(s) IV Push every 6 hours PRN Nausea and/or Vomiting      Weight: 99.1kg [17 Jan 2024]   Daily 94kg [07 Jan 2024]  Daily 93.3kg [19 Dec 2023]  Daily 93.3kg [11 Dec 2023]  Daily 93.3kg [08 Dec 2023]  Daily 93.3kg [6 Dec 2023]  Daily 94.2kg [28 Nov 2023]  Daily 94.4kg [27 Nov 2023]  Daily 94.2kg [26 Nov 2023]  Daily 94.2kg [22 Nov 2023]  Daily 94.2kg [16 Nov 2023]  Daily 94.2 [15 Nov 2023]  Daily 94.2kg [13 Nov 2023]  Daily 95.8kg [09 Nov 2023]  Daily 94.2kg [07 Nov 2023]  Daily 85.2kg [03 Nov 2023]  Daily 85.2kg [31 Oct 2023]  Daily 85.2kg [24 Oct 2023]  Daily 85.2kg [20 Oct 2023]  Daily 81.9kg [18 Oct 2023]  Daily 85.2kg [16 Oct 2023]  Daily   95.2kg [12 Oct 2023]   Daily 87.7kg [11 Oct 2023]  Daily 87.4kg [09 Oct 2023]  Daily 86.4kg [07 Oct 2023]  Daily 82.5kg [06 Oct 2023]  Daily 82.6kg [4 Oct 2023]   Daily 83.5kg [03 Oct 2023]  Daily 84.5kg [2 Oct 2023]  Daily 87.2kg [1 Oct 2023]  Daily 88.3kg [29 Sep 2023]  Daily 89.9kg [28 Sep 2023]  Daily 87.7kg [24 Sep 2023]  Daily 90.4kg [21 Sep 2023]  Daily 91.4kg [20 Sep 2023]  Daily 86.7kg [18 Sep 2023]  Daily 88.1kg [16 Sep 2023]  Daily 88.9kg [15 Sep 2023]   Daily 89.1 [14 Sep 2023]  Daily 92.9kg [6 Sep 2023]  Daily 91.8kg [27 Aug 2023]  Daily 101kg [9 Aug 2023]       Weight Change:  weight previously trending down, then up- now appears to be stable x ~2 months, recent weight gain likely secondary to increase in fluid provision/edema. Recommend continue weekly/daily weights for trending & ensuring adequacy of nutrition provision    Estimated energy needs:   Ideal body weight (86.4kg) used for calculations as pt >100% IBW and BMI <30 per [facility] Standards of Care. Needs estimated for age and adjusted for current clinical status, increased needs for post-op & open abd wound healing    Calories: 1756-4765 kcals based on 25-35 kcals/kg  Protein: 129.6-172.8 g protein based on 1.5-2.0g protein/kg + additional depending on outputs  *Fluid needs per team    IBW: 86.2kg  % IBW: 115.0%    Subjective:   78 M w/ Crohn's, AFib/Flutter s/p DCCVs on amiodarone, remote ileocectomy and open appendectomy. Admitted (6/23) for SBO vs Crohns flare, s/p NGT decompression and s/p lap TRE converted to open TRE, SBR x 3, left in discontinuity with abthera vac on (6/27), RTOR for ileocolic resection, small bowel anastomosis, and abdominal wall closure on (6/28), c/b fluid collection s/p IR aspiration of perihepatic fluid on (7/3), c/b wound dehiscence s/p RTOR exlap, washout, ileocolic resection with end ileostomy, blow hole colostomy, red rubber from ileostomy to small bowel anastomosis; vicryl bridging mesh on (7/5) transferred to SICU postoperatively for hemodynamic monitoring, with hospital course complicated by periods of AMS most recently on 9/1 and associated with oliguria, severe ELVI and hyponatremia, which resolved but stepped back up for to SICU on 9/10 for acute AMS, intermittent hypoglycemia, AFib with RVR. Percutaneous Cholecystostomy placed on 9/11 for enlarged GB, PCT capped 10/5 and uncapped 10/25 for hyperbilirubinemia, Right brachial DVT, left basillic and cephalic superficial thrombus on duplex 11/2, CT 11/14 with ALDEN ground glass opacity of unclear etiology, completed empiric 7day cefepime course 11/22, rising T-bili of unclear etilogy, now w resolved candida glabrata fungemia, ELVI improving.     Chart reviewed. Discussed with IDT today during AM rounds. Rx and labs reviewed. 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 Gattex initiated today- to monitor weights, outputs, wound healing, labs for absorption. To adjust TPN to prevent over/underfeeding. Micronutrients rechecked yesterday- zinc, copper, selenium, pending results. Labs- Vitamin D 7.9 <L> [1/21]. BUN 39 <H>, total bilirubin 4.4 <H>, direct bilirubin 2.9 <H>,  <H>, ALT 64 <H>. Meds- teduglutide [Gattex] inj. daily, 50,000 units vitamin D weekly, zofran prn, synthroid [administer 30-60 minutes before food; separate at least 4hrs from calcium or iron-containing products or bile acid sequestrants], EPO, ursodiol, cholestyramine. RDN will continue to monitor, reassess, and intervene as appropriate.     Previous Nutrition Diagnosis:  1. Increased Nutrient Needs R/T physiological demands for nutrient AEB post-op wound healing  Active [ X ]  Resolved [   ]    2. Altered GI function R/T extensive GI history, insufficient bowel, high output fistula AEB supplemental PN requirement to meet nutrition needs  Active [ X  ]  Resolved [   ]    If resolved, new PES:     Goal:  Pt will meet at least 75% of protein & energy needs via most appropriate route for nutrition     Recommendations:  1. c/w TPN via PICC as primary means to nutrition - Recommend 380g Dex, 120g AA, 50g SMOF lipids daily; provides 2272 kcals, 120g protein daily, GIR 4.56 mg/kg/min   - provides 26.3 kcals/kg, 20.7 non-protein kcals/kg, 1.4g protein/kg IBW  - monitor weights & wound healing, adjust substrates as indicated  - fluid/volume & lytes per team  - MVI, thiamine, vit C in bag, selenium  - c/w zinc 3x per week, copper & selenium in bag [levels rechecked 1/23, pending results]  2. PO diet per team discretion  -  c/w Regular diet as medically feasible to allow for more menu options  - c/w 1 LPS BID [200 kcals, 30g protein] + 1 Ensure Max daily [150 kcals, 30g protein] as amenable  - consider NPO for bowel rest as indicated   - ongoing education prn  - close monitoring of weights, fistula outputs, labs, wound healing, urine outputs & consider UUN & fecal fat studies to monitor changes in PO absorption  3. Close monitoring of weights, outputs, labs, adjust TPN as indicated to prevent over/underfeeding  4. Weekly lipid panel while on TPN  - hold/decrease lipids if TG >400  - continue to trend LFTs  5. c/w Vit D supplementation, recheck levels in 4 weeks  6. Recommend weekly weights for trending/ensuring adequacy of nutrition provision  7. Hydration per team  - risk for dehydration with high outputs, monitor  - additional fluids per team  - consider oral rehydration solution  8. Monitor BMP/Mg/Phos, POC BG while on TPN  - monitor & replenish lytes outside TPN bag prn  9. Continue close monitoring of clinical course & adjust recommendations prn    Education: ongoing diet education prn    Risk Level: High [ X ] Moderate [   ] Low [   ]

## 2024-01-24 NOTE — PROGRESS NOTE ADULT - ASSESSMENT

## 2024-01-24 NOTE — PROGRESS NOTE ADULT - SUBJECTIVE AND OBJECTIVE BOX
ON: episode of bleeding on wound manager, hemostasis achieved.     SUBJECTIVE: Pt seen and examined at bedside this am by surgery team. Received Gattex overnight, doing well on CPAP.     MEDICATIONS  (STANDING):  chlorhexidine 2% Cloths 1 Application(s) Topical <User Schedule>  cholestyramine Powder (Sugar-Free) 4 Gram(s) Oral daily  enoxaparin Injectable 40 milliGRAM(s) SubCutaneous every 24 hours  epoetin matt-epbx (RETACRIT) Injectable 18716 Unit(s) SubCutaneous every 7 days  ergocalciferol 26831 Unit(s) Oral <User Schedule>  gabapentin 100 milliGRAM(s) Oral at bedtime  glucagon  Injectable 1 milliGRAM(s) IntraMuscular once  levothyroxine Injectable 30 MICROGram(s) IV Push at bedtime  lipid, fat emulsion (Fish Oil and Plant Based) 20% Infusion 0.5319 Gm/kG/Day (20.83 mL/Hr) IV Continuous <Continuous>  melatonin 5 milliGRAM(s) Oral at bedtime  metoprolol tartrate Injectable 10 milliGRAM(s) IV Push every 6 hours  Parenteral Nutrition - Adult 1 Each TPN Continuous <Continuous>  timolol 0.5% Solution 1 Drop(s) Both EYES daily  ursodiol Suspension 300 milliGRAM(s) Oral every 8 hours  venlafaxine XR. 150 milliGRAM(s) Oral daily    MEDICATIONS  (PRN):  chlorhexidine 0.12% Liquid 15 milliLiter(s) Swish and Spit two times a day PRN oral hygeine  EPINEPHrine   1 mG/mL (1:1,000) Topical Solution 1 Application(s) Topical every 24 hours PRN for wound bleeding  LORazepam   Injectable 1 milliGRAM(s) IV Push <User Schedule> PRN Anxiety  ondansetron Injectable 4 milliGRAM(s) IV Push every 6 hours PRN Nausea and/or Vomiting      Vital Signs Last 24 Hrs  T(C): 36.7 (24 Jan 2024 05:09), Max: 37 (23 Jan 2024 08:30)  T(F): 98 (24 Jan 2024 05:09), Max: 98.6 (23 Jan 2024 08:30)  HR: 105 (24 Jan 2024 07:00) (81 - 110)  BP: 141/77 (24 Jan 2024 07:00) (111/55 - 175/86)  BP(mean): 102 (24 Jan 2024 07:00) (77 - 121)  RR: 23 (24 Jan 2024 07:00) (15 - 26)  SpO2: 95% (24 Jan 2024 07:00) (95% - 100%)    Parameters below as of 24 Jan 2024 07:00  Patient On (Oxygen Delivery Method): BiPAP/CPAP    O2 Concentration (%): 40      Physical Exam:  General: Patient is doing well and lying in bed comfortably  Constitutional: alert and awake, A&O x 4  Pulm: no respiratory distress  CV: Regular rate and rhythm, in and out of afib/flutter  Abd: soft, nontender, nondistended. No rebound, no guarding. Wound manager in place, no bleeding, no leaks. soft semi-formed output, brown in color; perc cony to gravity   Extremities: warm, well perfusedpulses        I&O's Detail    23 Jan 2024 07:01  -  24 Jan 2024 07:00  --------------------------------------------------------  IN:    Fat Emulsion (Fish Oil &amp; Plant Based) 20% Infusion: 270.4 mL    PRBCs (Packed Red Blood Cells): 300 mL    TPN (Total Parenteral Nutrition): 2224 mL  Total IN: 2794.4 mL    OUT:    Drain (mL): 1925 mL    Fat Emulsion (Fish Oil &amp; Plant Based) 20% Infusion: 0 mL    Voided (mL): 1000 mL  Total OUT: 2925 mL    Total NET: -130.6 mL          LABS:                        7.5    8.64  )-----------( 146      ( 24 Jan 2024 04:42 )             23.8     01-23    137  |  103  |  39<H>  ----------------------------<  142<H>  4.2   |  29  |  1.22    Ca    7.7<L>      23 Jan 2024 05:30  Phos  2.9     01-23  Mg     2.0     01-23    TPro  7.3  /  Alb  1.9<L>  /  TBili  4.4<H>  /  DBili  2.9<H>  /  AST  103<H>  /  ALT  64<H>  /  AlkPhos  101  01-23    PT/INR - ( 22 Jan 2024 09:01 )   PT: 14.6 sec;   INR: 1.29          PTT - ( 22 Jan 2024 09:01 )  PTT:38.4 sec  Urinalysis Basic - ( 23 Jan 2024 05:30 )    Color: x / Appearance: x / SG: x / pH: x  Gluc: 142 mg/dL / Ketone: x  / Bili: x / Urobili: x   Blood: x / Protein: x / Nitrite: x   Leuk Esterase: x / RBC: x / WBC x   Sq Epi: x / Non Sq Epi: x / Bacteria: x

## 2024-01-24 NOTE — PROGRESS NOTE ADULT - ATTENDING COMMENTS
AF, UC s/p SBR, ileocolic resection, ileostomy, enteroatmospheric fistula, cholecystostomy  physical as above  has finished cefepime course for pna  still SOB however  appears volume overloaded on POCUS with more B lines on R and increase in bilateral effusions  lasix today twice  albumin 25% infusion today  TPN written; TPN pharmacy increased volume back to 3 L so will go back to 2 L tomorrow  more BIPAP today, one hour every 4 and overnight  follow H/H re anemia

## 2024-01-24 NOTE — PROGRESS NOTE ADULT - SUBJECTIVE AND OBJECTIVE BOX
ON:   More anxious overnight, required multiple doses of Ativan to achieve sleep -   Other wise no acute events overnight     SUBJECTIVE:  Endorses feeling well, however overall anxious, nervous   Denies any pain, shortness of breath, no increased work of breathing   No further bleeding through midline wound   hb: 7.5     MEDICATIONS  (STANDING):  albumin human 25% IVPB 50 milliLiter(s) IV Intermittent every 8 hours  chlorhexidine 2% Cloths 1 Application(s) Topical <User Schedule>  cholestyramine Powder (Sugar-Free) 4 Gram(s) Oral daily  enoxaparin Injectable 40 milliGRAM(s) SubCutaneous every 24 hours  epoetin matt-epbx (RETACRIT) Injectable 30387 Unit(s) SubCutaneous every 7 days  ergocalciferol 78974 Unit(s) Oral <User Schedule>  furosemide   Injectable 20 milliGRAM(s) IV Push daily  gabapentin 100 milliGRAM(s) Oral at bedtime  glucagon  Injectable 1 milliGRAM(s) IntraMuscular once  levothyroxine Injectable 30 MICROGram(s) IV Push at bedtime  lipid, fat emulsion (Fish Oil and Plant Based) 20% Infusion 0.5319 Gm/kG/Day (20.83 mL/Hr) IV Continuous <Continuous>  melatonin 5 milliGRAM(s) Oral at bedtime  metoprolol tartrate Injectable 10 milliGRAM(s) IV Push every 6 hours  Parenteral Nutrition - Adult 1 Each TPN Continuous <Continuous>  Parenteral Nutrition - Adult 1 Each TPN Continuous <Continuous>  teduglutide Injectable 2.35 milliGRAM(s) SubCutaneous every 24 hours  timolol 0.5% Solution 1 Drop(s) Both EYES daily  ursodiol Suspension 300 milliGRAM(s) Oral every 8 hours  venlafaxine XR. 150 milliGRAM(s) Oral daily    MEDICATIONS  (PRN):  chlorhexidine 0.12% Liquid 15 milliLiter(s) Swish and Spit two times a day PRN oral hygeine  EPINEPHrine   1 mG/mL (1:1,000) Topical Solution 1 Application(s) Topical every 24 hours PRN for wound bleeding  LORazepam   Injectable 1 milliGRAM(s) IV Push <User Schedule> PRN Anxiety  ondansetron Injectable 4 milliGRAM(s) IV Push every 6 hours PRN Nausea and/or Vomiting    Drips:     ICU Vital Signs Last 24 Hrs  T(C): 37 (24 Jan 2024 09:00), Max: 37 (24 Jan 2024 09:00)  T(F): 98.6 (24 Jan 2024 09:00), Max: 98.6 (24 Jan 2024 09:00)  HR: 96 (24 Jan 2024 12:00) (81 - 110)  BP: 140/68 (24 Jan 2024 12:00) (111/55 - 175/86)  BP(mean): 91 (24 Jan 2024 12:00) (77 - 121)  ABP: --  ABP(mean): --  RR: 24 (24 Jan 2024 12:00) (15 - 35)  SpO2: 99% (24 Jan 2024 12:00) (93% - 100%)    O2 Parameters below as of 24 Jan 2024 12:00  Patient On (Oxygen Delivery Method): BiPAP/CPAP    O2 Concentration (%): 40    Physical Exam:  General: Patient is doing well and lying in bed comfortably  Constitutional: alert and awake, A&O x 4  Pulm: no respiratory distress  CV: Regular rate and rhythm, in and out of afib/flutter  Abd: soft, nontender, nondistended. No rebound, no guarding. Wound manager in place, no bleeding, no leaks. soft semi-formed output, brown in color; perc cony to gravity   Extremities: warm, well perfusedpulses, 1+ edema in lower extremities, pitting  Lines/tubes/drains:  [place]:	      Vent settings:      I&O's Summary    23 Jan 2024 07:01  -  24 Jan 2024 07:00  --------------------------------------------------------  IN: 2794.4 mL / OUT: 2950 mL / NET: -155.6 mL    24 Jan 2024 07:01  -  24 Jan 2024 12:07  --------------------------------------------------------  IN: 745 mL / OUT: 1075 mL / NET: -330 mL        LABS:                        7.5    8.64  )-----------( 146      ( 24 Jan 2024 04:42 )             23.8     01-23    137  |  103  |  39<H>  ----------------------------<  142<H>  4.2   |  29  |  1.22    Ca    7.7<L>      23 Jan 2024 05:30  Phos  2.9     01-23  Mg     2.0     01-23    TPro  7.3  /  Alb  1.9<L>  /  TBili  4.4<H>  /  DBili  2.9<H>  /  AST  103<H>  /  ALT  64<H>  /  AlkPhos  101  01-23      Urinalysis Basic - ( 23 Jan 2024 05:30 )    Color: x / Appearance: x / SG: x / pH: x  Gluc: 142 mg/dL / Ketone: x  / Bili: x / Urobili: x   Blood: x / Protein: x / Nitrite: x   Leuk Esterase: x / RBC: x / WBC x   Sq Epi: x / Non Sq Epi: x / Bacteria: x      CAPILLARY BLOOD GLUCOSE        LIVER FUNCTIONS - ( 23 Jan 2024 05:30 )  Alb: 1.9 g/dL / Pro: 7.3 g/dL / ALK PHOS: 101 U/L / ALT: 64 U/L / AST: 103 U/L / GGT: x             Cultures:    RADIOLOGY & ADDITIONAL STUDIES:

## 2024-01-24 NOTE — PROGRESS NOTE ADULT - ASSESSMENT
The patient is a 78 year old male with a PMHx of Crohn's, AFib/Flutter s/p DCCVs on amiodarone, remote ileocectomy and open appendectomy. Admitted (6/23) for SBO vs Crohns flare, s/p NGT decompression and s/p lap converted to open TRE, SBR x 3, left in discontinuity with abthera vac on (6/27), RTOR for ileocolic resection, small bowel anastomosis, and abdominal wall closure on (6/28), c/b fluid collection s/p IR aspiration of perihepatic fluid on (7/3), c/b wound dehiscence s/p RTOR exlap, washout, ileocolic resection with end ileostomy, blow hole colostomy, red rubber from ileostomy to small bowel anastomosis; vicryl bridging mesh on (7/5) transferred to SICU postoperatively for hemodynamic monitoring, with hospital course complicated by periods of severe ELVI and hyponatremia, which resolved but stepped back up for to SICU on (9/10) for acute AMS, intermittent hypoglycemia, AFib with RVR. Percutaneous Cholecystostomy placed on (9/11) for enlarged GB, PCT capped 10/5 and uncapped 10/25 for hyperbilirubinemia, Right brachial DVT, left basillic and cephalic superficial thrombus on duplex 11/2, CT 11/14 with ALDEN ground glass opacity of unclear etiology, completed empiric 7day cefepime course 11/22, rising T-bili of unclear etiology now w resolved candida glabrata fungemia, ELVI improving.     Gattex  Reg Diet  Pain/nausea control prn  Cefepime, last day today  BiPAP q4hprn/overnight  Wound manager changes daily with wound care  C/W octreotide, cholestyramine, Epogen weekly  SQL/SCDs/OOBA/IS  Rest of care per SICU

## 2024-01-24 NOTE — PROGRESS NOTE ADULT - SUBJECTIVE AND OBJECTIVE BOX
SUBJECTIVE: Pt seen and examined at bedside this am by surgery team. Patient is lying comfortably in bed with no complaints. Tolerating diet, pain well controlled with current regimen. Patient denies fever, nausea, vomiting, chest pain, and shortness of breath. BiPAP used overnight    MEDICATIONS  (STANDING):  albumin human 25% IVPB 50 milliLiter(s) IV Intermittent every 8 hours  chlorhexidine 2% Cloths 1 Application(s) Topical <User Schedule>  cholestyramine Powder (Sugar-Free) 4 Gram(s) Oral daily  enoxaparin Injectable 40 milliGRAM(s) SubCutaneous every 24 hours  epoetin matt-epbx (RETACRIT) Injectable 34489 Unit(s) SubCutaneous every 7 days  ergocalciferol 54860 Unit(s) Oral <User Schedule>  gabapentin 100 milliGRAM(s) Oral at bedtime  glucagon  Injectable 1 milliGRAM(s) IntraMuscular once  levothyroxine Injectable 30 MICROGram(s) IV Push at bedtime  lipid, fat emulsion (Fish Oil and Plant Based) 20% Infusion 0.5319 Gm/kG/Day (20.83 mL/Hr) IV Continuous <Continuous>  melatonin 5 milliGRAM(s) Oral at bedtime  metoprolol tartrate Injectable 10 milliGRAM(s) IV Push every 6 hours  Parenteral Nutrition - Adult 1 Each TPN Continuous <Continuous>  Parenteral Nutrition - Adult 1 Each TPN Continuous <Continuous>  teduglutide Injectable 2.35 milliGRAM(s) SubCutaneous every 24 hours  timolol 0.5% Solution 1 Drop(s) Both EYES daily  ursodiol Suspension 300 milliGRAM(s) Oral every 8 hours  venlafaxine XR. 150 milliGRAM(s) Oral daily    MEDICATIONS  (PRN):  chlorhexidine 0.12% Liquid 15 milliLiter(s) Swish and Spit two times a day PRN oral hygeine  EPINEPHrine   1 mG/mL (1:1,000) Topical Solution 1 Application(s) Topical every 24 hours PRN for wound bleeding  LORazepam   Injectable 1 milliGRAM(s) IV Push <User Schedule> PRN Anxiety  ondansetron Injectable 4 milliGRAM(s) IV Push every 6 hours PRN Nausea and/or Vomiting      Vital Signs Last 24 Hrs  T(C): 36.9 (24 Jan 2024 12:00), Max: 37 (24 Jan 2024 09:00)  T(F): 98.5 (24 Jan 2024 12:00), Max: 98.6 (24 Jan 2024 09:00)  HR: 82 (24 Jan 2024 16:00) (81 - 108)  BP: 103/58 (24 Jan 2024 16:00) (103/58 - 186/81)  BP(mean): 75 (24 Jan 2024 16:00) (75 - 121)  RR: 19 (24 Jan 2024 16:00) (15 - 35)  SpO2: 100% (24 Jan 2024 16:00) (93% - 100%)    Parameters below as of 24 Jan 2024 16:00  Patient On (Oxygen Delivery Method): room air        Physical Exam  General: Patient is doing well and sitting in bed comfortably  Constitutional: alert and oriented   Pulm: On BiPAP (intermittent), no respiratory distress  CV: Regular rate  Abd: soft, nontender, nondistended. No rebound, no guarding. Wound manager in place, no further bleeding, no leaks. soft semi-formed output, brown in color; Perc cony capped; Ileostomy with minimal output - no blood  Extremities: warm, well perfused, mild peripheral edema    I&O's Detail    23 Jan 2024 07:01  -  24 Jan 2024 07:00  --------------------------------------------------------  IN:    Fat Emulsion (Fish Oil &amp; Plant Based) 20% Infusion: 270.4 mL    PRBCs (Packed Red Blood Cells): 300 mL    TPN (Total Parenteral Nutrition): 2224 mL  Total IN: 2794.4 mL    OUT:    Drain (mL): 1925 mL    Drain (mL): 25 mL    Fat Emulsion (Fish Oil &amp; Plant Based) 20% Infusion: 0 mL    Voided (mL): 1000 mL  Total OUT: 2950 mL    Total NET: -155.6 mL      24 Jan 2024 07:01  -  24 Jan 2024 16:22  --------------------------------------------------------  IN:    Albumin 25%  -  50 mL: 50 mL    TPN (Total Parenteral Nutrition): 1251 mL  Total IN: 1301 mL    OUT:    Drain (mL): 0 mL    Drain (mL): 0 mL    Drain (mL): 355 mL    Voided (mL): 1490 mL  Total OUT: 1845 mL    Total NET: -544 mL        LABS:                        7.5    8.64  )-----------( 146      ( 24 Jan 2024 04:42 )             23.8     01-23    137  |  103  |  39<H>  ----------------------------<  142<H>  4.2   |  29  |  1.22    Ca    7.7<L>      23 Jan 2024 05:30  Phos  2.9     01-23  Mg     2.0     01-23    TPro  7.3  /  Alb  1.9<L>  /  TBili  4.4<H>  /  DBili  2.9<H>  /  AST  103<H>  /  ALT  64<H>  /  AlkPhos  101  01-23      Urinalysis Basic - ( 23 Jan 2024 05:30 )    Color: x / Appearance: x / SG: x / pH: x  Gluc: 142 mg/dL / Ketone: x  / Bili: x / Urobili: x   Blood: x / Protein: x / Nitrite: x   Leuk Esterase: x / RBC: x / WBC x   Sq Epi: x / Non Sq Epi: x / Bacteria: x

## 2024-01-25 NOTE — PROGRESS NOTE ADULT - NS ATTEND AMEND GEN_ALL_CORE FT
UC, AF, s/p SBR, ileocolic resection ileostomy, enteroatmospheric fistula, cholecystostomy, anemia  physical as above  TPN with decreased volume to 2 liters  dose lasix today for edema/effusions  dose acetazolamide  continue metoprolol  started gattex  NIV as needed and overnight

## 2024-01-25 NOTE — PROGRESS NOTE ADULT - ASSESSMENT
78 year old male with a PMHx of Crohn's, AFib/Flutter s/p DCCVs on amiodarone, remote ileocectomy and open appendectomy. Admitted (6/23) for SBO vs Crohns flare, s/p NGT decompression and s/p lap converted to open TRE, SBR x 3, left in discontinuity with abthera vac on (6/27), RTOR for ileocolic resection, small bowel anastomosis, and abdominal wall closure on (6/28), c/b fluid collection s/p IR aspiration of perihepatic fluid on (7/3), c/b wound dehiscence s/p RTOR exlap, washout, ileocolic resection with end ileostomy, blow hole colostomy, red rubber from ileostomy to small bowel anastomosis; vicryl bridging mesh on (7/5) transferred to SICU postoperatively for hemodynamic monitoring, with hospital course complicated by periods of severe ELVI and hyponatremia, which resolved but stepped back up for to SICU on (9/10) for acute AMS, intermittent hypoglycemia, AFib with RVR. Percutaneous Cholecystostomy placed on (9/11) for enlarged GB, PCT capped 10/5 and uncapped 10/25 for hyperbilirubinemia, Right brachial DVT, left basillic and cephalic superficial thrombus on duplex 11/2, CT 11/14 with ALDEN ground glass opacity of unclear etiology, completed empiric 7day cefepime course 11/22, rising T-bili of unclear etiology now w resolved candida glabrata fungemia, ELVI improving. Gattex started on 1/24    - Continue TPN  - Continue Gattex   - Team 5 will follow  - Plan discussed with Dr Peterson

## 2024-01-25 NOTE — PROGRESS NOTE ADULT - ASSESSMENT
The patient is a 78 year old male with a PMHx of Crohn's, AFib/Flutter s/p DCCVs on amiodarone, remote ileocectomy and open appendectomy. Admitted (6/23) for SBO vs Crohns flare, s/p NGT decompression and s/p lap converted to open TRE, SBR x 3, left in discontinuity with abthera vac on (6/27), RTOR for ileocolic resection, small bowel anastomosis, and abdominal wall closure on (6/28), c/b fluid collection s/p IR aspiration of perihepatic fluid on (7/3), c/b wound dehiscence s/p RTOR exlap, washout, ileocolic resection with end ileostomy, blow hole colostomy, red rubber from ileostomy to small bowel anastomosis; vicryl bridging mesh on (7/5) transferred to SICU postoperatively for hemodynamic monitoring, with hospital course complicated by periods of severe ELVI and hyponatremia, which resolved but stepped back up for to SICU on (9/10) for acute AMS, intermittent hypoglycemia, AFib with RVR. Percutaneous Cholecystostomy placed on (9/11) for enlarged GB, PCT capped 10/5 and uncapped 10/25 for hyperbilirubinemia, Right brachial DVT, left basillic and cephalic superficial thrombus on duplex 11/2, CT 11/14 with ALDEN ground glass opacity of unclear etiology, completed empiric 7day cefepime course 11/22, rising T-bili of unclear etiology now w resolved candida glabrata fungemia    Recommendations  Continue with daily Gattex  Reg Diet  Consider SSRI solution instead of tablet to aid in absorption, sublingual if available  Pain/nausea control prn  BiPAP q4hprn/overnight  Wound manager changes daily with wound care  C/W octreotide, cholestyramine, Epogen weekly  SQL/SCDs/OOBA/IS  Rest of care per SICU

## 2024-01-25 NOTE — PROGRESS NOTE ADULT - SUBJECTIVE AND OBJECTIVE BOX
ON: bleeding on wound manager that resolved spontaneously, reported some anxiety and shortness of breath overnight     SUBJECTIVE: Pt seen and examined at bedside this am by surgery team. Tolerating diet, reports feeling better, no shortness of breath.     MEDICATIONS  (STANDING):  chlorhexidine 2% Cloths 1 Application(s) Topical <User Schedule>  cholestyramine Powder (Sugar-Free) 4 Gram(s) Oral daily  enoxaparin Injectable 40 milliGRAM(s) SubCutaneous every 24 hours  epoetin matt-epbx (RETACRIT) Injectable 48995 Unit(s) SubCutaneous every 7 days  ergocalciferol 23745 Unit(s) Oral <User Schedule>  gabapentin 100 milliGRAM(s) Oral at bedtime  glucagon  Injectable 1 milliGRAM(s) IntraMuscular once  levothyroxine Injectable 30 MICROGram(s) IV Push at bedtime  lipid, fat emulsion (Fish Oil and Plant Based) 20% Infusion 0.5319 Gm/kG/Day (20.83 mL/Hr) IV Continuous <Continuous>  melatonin 5 milliGRAM(s) Oral at bedtime  metoprolol tartrate Injectable 10 milliGRAM(s) IV Push every 6 hours  Parenteral Nutrition - Adult 1 Each TPN Continuous <Continuous>  teduglutide Injectable 2.35 milliGRAM(s) SubCutaneous every 24 hours  timolol 0.5% Solution 1 Drop(s) Both EYES daily  ursodiol Suspension 300 milliGRAM(s) Oral every 8 hours  venlafaxine XR. 150 milliGRAM(s) Oral daily    MEDICATIONS  (PRN):  chlorhexidine 0.12% Liquid 15 milliLiter(s) Swish and Spit two times a day PRN oral hygeine  EPINEPHrine   1 mG/mL (1:1,000) Topical Solution 1 Application(s) Topical every 24 hours PRN for wound bleeding  LORazepam   Injectable 1 milliGRAM(s) IV Push <User Schedule> PRN Anxiety  ondansetron Injectable 4 milliGRAM(s) IV Push every 6 hours PRN Nausea and/or Vomiting      Vital Signs Last 24 Hrs  T(C): 36.8 (24 Jan 2024 22:12), Max: 37 (24 Jan 2024 09:00)  T(F): 98.3 (24 Jan 2024 22:12), Max: 98.6 (24 Jan 2024 09:00)  HR: 109 (25 Jan 2024 06:16) (82 - 112)  BP: 139/75 (25 Jan 2024 06:00) (103/58 - 186/81)  BP(mean): 102 (25 Jan 2024 06:00) (75 - 116)  RR: 24 (25 Jan 2024 06:16) (15 - 35)  SpO2: 97% (25 Jan 2024 06:16) (93% - 100%)    Parameters below as of 25 Jan 2024 06:16  Patient On (Oxygen Delivery Method): nasal cannula          Physical Exam:  General: Patient is doing well and lying in bed comfortably  Constitutional: alert and awake, A&O x 4  Pulm: no respiratory distress  CV: Regular rate and rhythm, in and out of afib/flutter  Abd: soft, nontender, nondistended. No rebound, no guarding. Wound manager in place, no bleeding, no leaks. Ileostomy and wound manager with soft semi-formed output, brown in color; perc cony capped.  Extremities: warm, well perfusedpulses        I&O's Detail    24 Jan 2024 07:01  -  25 Jan 2024 07:00  --------------------------------------------------------  IN:    Albumin 25%  -  50 mL: 50 mL    Fat Emulsion (Fish Oil &amp; Plant Based) 20% Infusion: 270.8 mL    TPN (Total Parenteral Nutrition): 4249 mL  Total IN: 4569.8 mL    OUT:    Drain (mL): 0 mL    Drain (mL): 75 mL    Drain (mL): 1180 mL    Voided (mL): 3140 mL  Total OUT: 4395 mL    Total NET: 174.8 mL          LABS:                        7.5    8.64  )-----------( 146      ( 24 Jan 2024 04:42 )             23.8     01-25    139  |  101  |  40<H>  ----------------------------<  123<H>  3.8   |  31  |  1.17    Ca    7.8<L>      25 Jan 2024 05:30  Phos  2.9     01-25  Mg     1.9     01-25    TPro  7.7  /  Alb  2.4<L>  /  TBili  4.6<H>  /  DBili  3.0<H>  /  AST  90<H>  /  ALT  57<H>  /  AlkPhos  102  01-25    PT/INR - ( 25 Jan 2024 05:30 )   PT: 14.7 sec;   INR: 1.30          PTT - ( 25 Jan 2024 05:30 )  PTT:36.6 sec  Urinalysis Basic - ( 25 Jan 2024 05:30 )    Color: x / Appearance: x / SG: x / pH: x  Gluc: 123 mg/dL / Ketone: x  / Bili: x / Urobili: x   Blood: x / Protein: x / Nitrite: x   Leuk Esterase: x / RBC: x / WBC x   Sq Epi: x / Non Sq Epi: x / Bacteria: x

## 2024-01-25 NOTE — PROGRESS NOTE ADULT - SUBJECTIVE AND OBJECTIVE BOX
SUBJECTIVE: Pt seen and examined at bedside this am by surgery team. Patient is lying comfortably in bed with no complaints. Tolerating diet, pain well controlled with current regimen. Patient denies fever, nausea, vomiting, chest pain, and shortness of breath. BiPAP used overnight. Received 1st dose of Gattex yesterday, planned to continue SQ doses daily.     MEDICATIONS  (STANDING):  chlorhexidine 2% Cloths 1 Application(s) Topical <User Schedule>  cholestyramine Powder (Sugar-Free) 4 Gram(s) Oral daily  enoxaparin Injectable 40 milliGRAM(s) SubCutaneous every 24 hours  epoetin matt-epbx (RETACRIT) Injectable 16999 Unit(s) SubCutaneous every 7 days  ergocalciferol 07511 Unit(s) Oral <User Schedule>  gabapentin 100 milliGRAM(s) Oral at bedtime  glucagon  Injectable 1 milliGRAM(s) IntraMuscular once  levothyroxine Injectable 30 MICROGram(s) IV Push at bedtime  lipid, fat emulsion (Fish Oil and Plant Based) 20% Infusion 0.5319 Gm/kG/Day (20.83 mL/Hr) IV Continuous <Continuous>  melatonin 5 milliGRAM(s) Oral at bedtime  metoprolol tartrate Injectable 10 milliGRAM(s) IV Push every 6 hours  Parenteral Nutrition - Adult 1 Each TPN Continuous <Continuous>  teduglutide Injectable 2.35 milliGRAM(s) SubCutaneous every 24 hours  timolol 0.5% Solution 1 Drop(s) Both EYES daily  ursodiol Suspension 300 milliGRAM(s) Oral every 8 hours  venlafaxine XR. 150 milliGRAM(s) Oral daily    MEDICATIONS  (PRN):  chlorhexidine 0.12% Liquid 15 milliLiter(s) Swish and Spit two times a day PRN oral hygeine  EPINEPHrine   1 mG/mL (1:1,000) Topical Solution 1 Application(s) Topical every 24 hours PRN for wound bleeding  LORazepam   Injectable 1 milliGRAM(s) IV Push <User Schedule> PRN Anxiety  ondansetron Injectable 4 milliGRAM(s) IV Push every 6 hours PRN Nausea and/or Vomiting      Vital Signs Last 24 Hrs  T(C): 36.8 (24 Jan 2024 22:12), Max: 36.9 (24 Jan 2024 12:00)  T(F): 98.3 (24 Jan 2024 22:12), Max: 98.5 (24 Jan 2024 12:00)  HR: 106 (25 Jan 2024 09:27) (82 - 112)  BP: 149/72 (25 Jan 2024 09:00) (103/58 - 186/81)  BP(mean): 104 (25 Jan 2024 09:00) (75 - 116)  RR: 14 (25 Jan 2024 09:00) (14 - 35)  SpO2: 97% (25 Jan 2024 09:27) (93% - 100%)    Parameters below as of 25 Jan 2024 09:00  Patient On (Oxygen Delivery Method): BiPAP/CPAP    Physical Exam  General: Patient is doing well and sitting in bed comfortably  Constitutional: alert and oriented   Pulm: On BiPAP (overnight), no respiratory distress  CV: Regular rate  Abd: soft, nontender, nondistended. No rebound, no guarding. Wound manager in place, no bleeding, no leaks. soft semi-formed output; Perc cony capped; Ileostomy with minimal output  Extremities: warm, well perfused, mild peripheral edema    I&O's Detail    24 Jan 2024 07:01  -  25 Jan 2024 07:00  --------------------------------------------------------  IN:    Albumin 25%  -  50 mL: 50 mL    Fat Emulsion (Fish Oil &amp; Plant Based) 20% Infusion: 270.8 mL    TPN (Total Parenteral Nutrition): 4463 mL  Total IN: 4783.8 mL    OUT:    Drain (mL): 0 mL    Drain (mL): 75 mL    Drain (mL): 1180 mL    Voided (mL): 3140 mL  Total OUT: 4395 mL    Total NET: 388.8 mL      25 Jan 2024 07:01  -  25 Jan 2024 09:53  --------------------------------------------------------  IN:    TPN (Total Parenteral Nutrition): 214 mL  Total IN: 214 mL    OUT:    Drain (mL): 50 mL    Voided (mL): 100 mL  Total OUT: 150 mL    Total NET: 64 mL        LABS:                        7.5    8.64  )-----------( 146      ( 24 Jan 2024 04:42 )             23.8     01-25    139  |  101  |  40<H>  ----------------------------<  123<H>  3.8   |  31  |  1.17    Ca    7.8<L>      25 Jan 2024 05:30  Phos  2.9     01-25  Mg     1.9     01-25    TPro  7.7  /  Alb  2.4<L>  /  TBili  4.6<H>  /  DBili  3.0<H>  /  AST  90<H>  /  ALT  57<H>  /  AlkPhos  102  01-25    PT/INR - ( 25 Jan 2024 05:30 )   PT: 14.7 sec;   INR: 1.30          PTT - ( 25 Jan 2024 05:30 )  PTT:36.6 sec  Urinalysis Basic - ( 25 Jan 2024 05:30 )    Color: x / Appearance: x / SG: x / pH: x  Gluc: 123 mg/dL / Ketone: x  / Bili: x / Urobili: x   Blood: x / Protein: x / Nitrite: x   Leuk Esterase: x / RBC: x / WBC x   Sq Epi: x / Non Sq Epi: x / Bacteria: x

## 2024-01-25 NOTE — PROGRESS NOTE ADULT - ASSESSMENT
78M w PMH Crohn's, AFib/Flutter s/p DCCVs on amiodarone, remote ileocectomy and open appendectomy. Admitted (6/23) for SBO vs Crohns flare, s/p NGT decompression and s/p lap converted to open TRE, SBR x 3, left in discontinuity with abthera vac on (6/27), RTOR for ileocolic resection, small bowel anastomosis, and abdominal wall closure on (6/28), c/b fluid collection s/p IR aspiration of perihepatic fluid on (7/3), c/b wound dehiscence s/p RTOR exlap, washout, ileocolic resection with end ileostomy, blow hole colostomy, red rubber from ileostomy to small bowel anastomosis; vicryl bridging mesh on (7/5) transferred to SICU postoperatively for hemodynamic monitoring, with hospital course complicated by periods of severe ELVI and hyponatremia, which resolved but stepped back up for to SICU on (9/10) for acute AMS, intermittent hypoglycemia, AFib with RVR. Percutaneous Cholecystostomy placed on (9/11) for enlarged GB, PCT capped 10/5 and uncapped 10/25 for hyperbilirubinemia, Right brachial DVT, left basillic and cephalic superficial thrombus on duplex 11/2, CT 11/14 with ALDEN ground glass opacity of unclear etiology, completed empiric 7day cefepime course 11/22, rising T-bili of unclear etilogy, now with resolved candida glabrata fungemia, ELVI improving.     NEURO: Hx of depression: cont Effexor (halved dose in setting of renal dysfunx). Anxiety: Lorazepam PRN. Holistic consult for anxiety and insomnia. Gabapentin for restless leg syndrome.   HEENT: Timolol eye gtt added on 1/10 for additional systemic BB support in setting of malabsorption  CV: Hx of Afib and recent Aflutter alternating with sinus tachycardia (HR 100s): Cont Lopressor to 10 q6h, added timolol eye ggt for attempted systemic affect. Started therapeutic Lovenox on 1/10, but held since 1/15 in setting of dark red output from ileostomy. s/p previous DCCV, off Amiodarone and Lipitor due to persistent transaminitis. BLE duplex (1/5) no DVT, TTE  (7/18) - PASP 64mmHg, EF 65-70%. holding Losartan & norvasc because not absorbing PO meds, TTE (1/4) LVEF 75%, mild/mod AR, PASP 40, RVEF wnl. 2 liters of fluid in TPN to match I/O.    PULM: Atelectasis/dyspnea improved clinically: cont 1 hr of BiPAP every 12hrs and nasal canula or room air. PRN duonebs for wheezing. Encourage OOB and IS. CT from 11/14 with ALDEN ground glass opacity of unclear etiology. COVID negative. RVP negative. Completed 7d empiric cefepime 11/22 to cover for potential PNA. leukocytosis, POCUS 2 days ago with ?consolidation suggesting possible PNA? - on another course of cefepime (1/15-23).    GI/FEN: Low res, low fat, high protein diet. Cont Ensure max x1/day however pt likely not absorbing adequately due to proximal fistula. Folate, thiamine. PICC replaced 12/4, switched out PICC on 1/4. Continue TPN/lipids, 2 liters in TPN in order to make I/O even. 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 cont Octreotide & Cholestyramine 10/18. Transaminitis slowly improved, elevated T bili, s/p Perc Deb on 9/11, uncapped on 1/15, will recap perc deb today (11/17). been seen by Hepatology - MRCP 10/30 no obstruction, cont ursodiol, RUQ US 11/25 CBD 5mm, hepatic vessels patent. Cont Vit D weekly.   : Voids. ELVI improved: stopped famotidine given renal dysfunction. MARLO Duplex and RP US unremarkable, CK wnl.    ENDO: Hx hypothyroid: IV Synthroid,  Repeat thyroid studies WNL 1/3/24.   ID: Leukocytosis, possible PNA, Cefepime (1/15-23) // BCx 11/22 grew candida glabrata, likely CLABSI. s/p Caspo & completed Fluconazole course (12/1-12/9). Ophthalmology evaluated - normal ocular exam. Echo no vegetation. PICC replaced on 12/4. Cont Nystatin powder. // DC: caspofungin (11/24-12/1). empiric 7days cefepime (11/15-11/22), C. tertium, Lactobacillus from IR cx 7/3, and candida albicans, lactobacillus, vanc sensitive E faecium, vanc resistant E gallinarum, vanc resistant E casseliflavus, lactobacillus from OR cx 7/5. Completed course of abx with Imipenem (9/10-9/15). Imipenem (8/26--) imipenem (6/30-7/12, 7/23-7/30), Dapto (6/30-7/5 and 7/23-7/24). Empiric Dapto (8/23-24) and Cefepime (8/23-24).   PPX: SCDs, held therapeutic Lovenox for Afib in setting of dark blood from ileostomy, but will restart PPX lovenox given hx of thrombosis.   HEME: Anemia - continue Epogen weekly. S/p Iron Sucrose 200 qd x 3 days for chronic anemia.   LINES: PIVs, RUE PICC placed (1/5--) will plan to switch PICC once a month in setting of fungemia history // DC: LUE PICC (9/1-11/1 ), RUE PICC: (11/1-11/24), LUE PICC (12/4-1/5)  WOUNDS/DRAINS: Abdominal wound and fistula with wound manager in place dressing change daily. Had recurrent punctate bleeding at wound bed, s/p placed surgicel, attempt to stitch, electrocautery and epi soaked gauze. Cont epinephrine soaked gauze at bedside as needed for wound bleeding. RLQ Ostomy. IR perc deb tube uncapped 1/15-1/17 // DC: L Clay drain.  PT: Ambulate as tolerated OOB to chair daily.  DISPO: SICU due to complicated hospital course. but will downgrade labs to every other day 78M w PMH Crohn's, AFib/Flutter s/p DCCVs on amiodarone, remote ileocectomy and open appendectomy. Admitted (6/23) for SBO vs Crohns flare, s/p NGT decompression and s/p lap converted to open TRE, SBR x 3, left in discontinuity with abthera vac on (6/27), RTOR for ileocolic resection, small bowel anastomosis, and abdominal wall closure on (6/28), c/b fluid collection s/p IR aspiration of perihepatic fluid on (7/3), c/b wound dehiscence s/p RTOR exlap, washout, ileocolic resection with end ileostomy, blow hole colostomy, red rubber from ileostomy to small bowel anastomosis; vicryl bridging mesh on (7/5) transferred to SICU postoperatively for hemodynamic monitoring, with hospital course complicated by periods of severe ELVI and hyponatremia, which resolved but stepped back up for to SICU on (9/10) for acute AMS, intermittent hypoglycemia, AFib with RVR. Percutaneous Cholecystostomy placed on (9/11) for enlarged GB, PCT capped 10/5 and uncapped 10/25 for hyperbilirubinemia, Right brachial DVT, left basillic and cephalic superficial thrombus on duplex 11/2, CT 11/14 with ALDEN ground glass opacity of unclear etiology, completed empiric 7day cefepime course 11/22, rising T-bili of unclear etilogy, now with resolved candida glabrata fungemia, ELVI improving.     NEURO: Hx of depression: cont Effexor (halved dose in setting of renal dysfunx). Anxiety: Lorazepam PRN. Holistic consult for anxiety and insomnia. Gabapentin for restless leg syndrome.   HEENT: Timolol eye gtt added on 1/10 for additional systemic BB support in setting of malabsorption  CV: Volume overload on exam. 40 IVP lasix x1 today. Hx of Afib and recent Aflutter alternating with sinus tachycardia (HR 100s): Cont Lopressor to 10 q6h, added timolol eye ggt for attempted systemic affect. Started therapeutic Lovenox on 1/10, but held since 1/15 in setting of dark red output from ileostomy. s/p previous DCCV, off Amiodarone and Lipitor due to persistent transaminitis. BLE duplex (1/5) no DVT, TTE  (7/18) - PASP 64mmHg, EF 65-70%. holding Losartan & norvasc because not absorbing PO meds, TTE (1/4) LVEF 75%, mild/mod AR, PASP 40, RVEF wnl. 2 liters of fluid in TPN to match I/O.    PULM: Atelectasis/dyspnea improved clinically: cont 1 hr of BiPAP every 12hrs and nasal canula or room air. PRN duonebs for wheezing. Encourage OOB and IS. CT from 11/14 with ALDEN ground glass opacity of unclear etiology. COVID negative. RVP negative. Completed 7d empiric cefepime 11/22 to cover for potential PNA. leukocytosis, POCUS 2 days ago with ?consolidation suggesting possible PNA? - on another course of cefepime (1/15-23).    GI/FEN: Low res, low fat, high protein diet. Cont Ensure max x1/day however pt likely not absorbing adequately due to proximal fistula. Folate, thiamine. PICC replaced 12/4, switched out PICC on 1/4. Continue TPN/lipids, 2 liters in TPN in order to make I/O even. 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 cont Octreotide & Cholestyramine 10/18. Transaminitis slowly improved, elevated T bili, s/p Perc Deb on 9/11, uncapped on 1/15, will recap perc deb today (11/17). been seen by Hepatology - MRCP 10/30 no obstruction, cont ursodiol, RUQ US 11/25 CBD 5mm, hepatic vessels patent. Cont Vit D weekly.   : Voids. ELVI improved: stopped famotidine given renal dysfunction. MARLO Duplex and RP US unremarkable, CK wnl.    ENDO: Hx hypothyroid: IV Synthroid,  Repeat thyroid studies WNL 1/3/24.   ID: Leukocytosis, possible PNA, Cefepime (1/15-23) // BCx 11/22 grew candida glabrata, likely CLABSI. s/p Caspo & completed Fluconazole course (12/1-12/9). Ophthalmology evaluated - normal ocular exam. Echo no vegetation. PICC replaced on 12/4. Cont Nystatin powder. // DC: caspofungin (11/24-12/1). empiric 7days cefepime (11/15-11/22), C. tertium, Lactobacillus from IR cx 7/3, and candida albicans, lactobacillus, vanc sensitive E faecium, vanc resistant E gallinarum, vanc resistant E casseliflavus, lactobacillus from OR cx 7/5. Completed course of abx with Imipenem (9/10-9/15). Imipenem (8/26--) imipenem (6/30-7/12, 7/23-7/30), Dapto (6/30-7/5 and 7/23-7/24). Empiric Dapto (8/23-24) and Cefepime (8/23-24).   PPX: SCDs, held therapeutic Lovenox for Afib in setting of dark blood from ileostomy, but will restart PPX lovenox given hx of thrombosis.   HEME: Anemia - continue Epogen weekly. S/p Iron Sucrose 200 qd x 3 days for chronic anemia.   LINES: PIVs, RUE PICC placed (1/5--) will plan to switch PICC once a month in setting of fungemia history // DC: LUE PICC (9/1-11/1 ), RUE PICC: (11/1-11/24), LUE PICC (12/4-1/5)  WOUNDS/DRAINS: Abdominal wound and fistula with wound manager in place dressing change daily. Had recurrent punctate bleeding at wound bed, s/p placed surgicel, attempt to stitch, electrocautery and epi soaked gauze. Cont epinephrine soaked gauze at bedside as needed for wound bleeding. RLQ Ostomy. IR perc deb tube uncapped 1/15-1/17 // DC: L Clay drain.  PT: Ambulate as tolerated OOB to chair daily.  DISPO: SICU due to complicated hospital course. but will downgrade labs to every other day 78M w PMH Crohn's, AFib/Flutter s/p DCCVs on amiodarone, remote ileocectomy and open appendectomy. Admitted (6/23) for SBO vs Crohns flare, s/p NGT decompression and s/p lap converted to open TRE, SBR x 3, left in discontinuity with abthera vac on (6/27), RTOR for ileocolic resection, small bowel anastomosis, and abdominal wall closure on (6/28), c/b fluid collection s/p IR aspiration of perihepatic fluid on (7/3), c/b wound dehiscence s/p RTOR exlap, washout, ileocolic resection with end ileostomy, blow hole colostomy, red rubber from ileostomy to small bowel anastomosis; vicryl bridging mesh on (7/5) transferred to SICU postoperatively for hemodynamic monitoring, with hospital course complicated by periods of severe ELVI and hyponatremia, which resolved but stepped back up for to SICU on (9/10) for acute AMS, intermittent hypoglycemia, AFib with RVR. Percutaneous Cholecystostomy placed on (9/11) for enlarged GB, PCT capped 10/5 and uncapped 10/25 for hyperbilirubinemia, Right brachial DVT, left basillic and cephalic superficial thrombus on duplex 11/2, CT 11/14 with ALDEN ground glass opacity of unclear etiology, completed empiric 7day cefepime course 11/22, rising T-bili of unclear etilogy, now with resolved candida glabrata fungemia, ELVI improving.     NEURO: Hx of depression: cont Effexor (halved dose in setting of renal dysfunx). Anxiety: Lorazepam PRN. Holistic consult for anxiety and insomnia. Gabapentin for restless leg syndrome.   HEENT: Timolol eye gtt added on 1/10 for additional systemic BB support in setting of malabsorption  CV: Volume overload on exam. 40 IVP lasix x1 today with 250mg Diamox. Hx of Afib and recent Aflutter alternating with sinus tachycardia (HR 100s): Cont Lopressor to 10 q6h, added timolol eye ggt for attempted systemic affect. Started therapeutic Lovenox on 1/10, but held since 1/15 in setting of dark red output from ileostomy. s/p previous DCCV, off Amiodarone and Lipitor due to persistent transaminitis. BLE duplex (1/5) no DVT, TTE  (7/18) - PASP 64mmHg, EF 65-70%. holding Losartan & norvasc because not absorbing PO meds, TTE (1/4) LVEF 75%, mild/mod AR, PASP 40, RVEF wnl. 2 liters of fluid in TPN to match I/O.    PULM: Atelectasis/dyspnea improved clinically: cont 1 hr of BiPAP every 12hrs and nasal canula or room air. PRN duonebs for wheezing. Encourage OOB and IS. CT from 11/14 with ALDEN ground glass opacity of unclear etiology. COVID negative. RVP negative. Completed 7d empiric cefepime 11/22 to cover for potential PNA. leukocytosis, POCUS 2 days ago with ?consolidation suggesting possible PNA? - on another course of cefepime (1/15-23).    GI/FEN: Low res, low fat, high protein diet. Cont Ensure max x1/day however pt likely not absorbing adequately due to proximal fistula. Folate, thiamine. PICC replaced 12/4, switched out PICC on 1/4. Continue TPN/lipids, 2 liters in TPN in order to make I/O even. 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 cont Octreotide & Cholestyramine 10/18. Transaminitis slowly improved, elevated T bili, s/p Perc Deb on 9/11, uncapped on 1/15, will recap perc deb today (11/17). been seen by Hepatology - MRCP 10/30 no obstruction, cont ursodiol, RUQ US 11/25 CBD 5mm, hepatic vessels patent. Cont Vit D weekly.   : Voids. ELVI improved: stopped famotidine given renal dysfunction. MARLO Duplex and RP US unremarkable, CK wnl.    ENDO: Hx hypothyroid: IV Synthroid,  Repeat thyroid studies WNL 1/3/24.   ID: Leukocytosis, possible PNA, Cefepime (1/15-23) // BCx 11/22 grew candida glabrata, likely CLABSI. s/p Caspo & completed Fluconazole course (12/1-12/9). Ophthalmology evaluated - normal ocular exam. Echo no vegetation. PICC replaced on 12/4. Cont Nystatin powder. // DC: caspofungin (11/24-12/1). empiric 7days cefepime (11/15-11/22), C. tertium, Lactobacillus from IR cx 7/3, and candida albicans, lactobacillus, vanc sensitive E faecium, vanc resistant E gallinarum, vanc resistant E casseliflavus, lactobacillus from OR cx 7/5. Completed course of abx with Imipenem (9/10-9/15). Imipenem (8/26--) imipenem (6/30-7/12, 7/23-7/30), Dapto (6/30-7/5 and 7/23-7/24). Empiric Dapto (8/23-24) and Cefepime (8/23-24).   PPX: SCDs, held therapeutic Lovenox for Afib in setting of dark blood from ileostomy, but will restart PPX lovenox given hx of thrombosis.   HEME: Anemia - continue Epogen weekly. S/p Iron Sucrose 200 qd x 3 days for chronic anemia.   LINES: PIVs, RUE PICC placed (1/5--) will plan to switch PICC once a month in setting of fungemia history // DC: LUE PICC (9/1-11/1 ), RUE PICC: (11/1-11/24), LUE PICC (12/4-1/5)  WOUNDS/DRAINS: Abdominal wound and fistula with wound manager in place dressing change daily. Had recurrent punctate bleeding at wound bed, s/p placed surgicel, attempt to stitch, electrocautery and epi soaked gauze. Cont epinephrine soaked gauze at bedside as needed for wound bleeding. RLQ Ostomy. IR perc deb tube uncapped 1/15-1/17 // DC: L Clay drain.  PT: Ambulate as tolerated OOB to chair daily.  DISPO: SICU due to complicated hospital course. but will downgrade labs to every other day

## 2024-01-26 NOTE — PROCEDURE NOTE - ADDITIONAL PROCEDURE DETAILS
RED LUMEN FOR TPN ONLY  PURPLE LUMEN FOR ALL OTHER MEDICATIONS
20 gauge 1.88 inch peripheral IV placed under ultrasound guidance.
US guided PIV placed in LUE easily
US guided PIV placed in LUE, confirmed w US.

## 2024-01-26 NOTE — PROGRESS NOTE ADULT - SUBJECTIVE AND OBJECTIVE BOX
SUBJECTIVE: Pt seen and examined at bedside this am by surgery team. Tolerating diet, some shortness of breath overnight. .    ON: received Lasix 40 mg.     MEDICATIONS  (STANDING):  chlorhexidine 2% Cloths 1 Application(s) Topical <User Schedule>  cholestyramine Powder (Sugar-Free) 4 Gram(s) Oral daily  enoxaparin Injectable 40 milliGRAM(s) SubCutaneous every 24 hours  epoetin matt-epbx (RETACRIT) Injectable 42394 Unit(s) SubCutaneous every 7 days  ergocalciferol 86139 Unit(s) Oral <User Schedule>  gabapentin 100 milliGRAM(s) Oral at bedtime  glucagon  Injectable 1 milliGRAM(s) IntraMuscular once  levothyroxine Injectable 30 MICROGram(s) IV Push at bedtime  lipid, fat emulsion (Fish Oil and Plant Based) 20% Infusion 0.5319 Gm/kG/Day (20.83 mL/Hr) IV Continuous <Continuous>  melatonin 5 milliGRAM(s) Oral at bedtime  metoprolol tartrate Injectable 10 milliGRAM(s) IV Push every 6 hours  Parenteral Nutrition - Adult 1 Each TPN Continuous <Continuous>  teduglutide Injectable 4.7 milliGRAM(s) SubCutaneous every 24 hours  timolol 0.5% Solution 1 Drop(s) Both EYES daily  ursodiol Suspension 300 milliGRAM(s) Oral every 8 hours  venlafaxine XR. 150 milliGRAM(s) Oral daily    MEDICATIONS  (PRN):  chlorhexidine 0.12% Liquid 15 milliLiter(s) Swish and Spit two times a day PRN oral hygeine  EPINEPHrine   1 mG/mL (1:1,000) Topical Solution 1 Application(s) Topical every 24 hours PRN for wound bleeding  LORazepam   Injectable 1 milliGRAM(s) IV Push <User Schedule> PRN Anxiety  ondansetron Injectable 4 milliGRAM(s) IV Push every 6 hours PRN Nausea and/or Vomiting      Vital Signs Last 24 Hrs  T(C): 36.3 (26 Jan 2024 05:12), Max: 36.9 (25 Jan 2024 12:00)  T(F): 97.3 (26 Jan 2024 05:12), Max: 98.5 (25 Jan 2024 12:00)  HR: 96 (26 Jan 2024 05:26) (93 - 117)  BP: 144/78 (26 Jan 2024 05:00) (116/63 - 164/79)  BP(mean): 104 (26 Jan 2024 05:00) (84 - 113)  RR: 18 (26 Jan 2024 05:00) (14 - 38)  SpO2: 100% (26 Jan 2024 05:26) (90% - 100%)    Parameters below as of 26 Jan 2024 06:00  Patient On (Oxygen Delivery Method): BiPAP/CPAP    O2 Concentration (%): 40    Physical Exam:  General: Patient is doing well and lying in bed comfortably  Constitutional: alert and awake, A&O x 4  Pulm: no respiratory distress  CV: Regular rate and rhythm, in and out of afib/flutter  Abd: soft, nontender, nondistended. No rebound, no guarding. Wound manager in place, no bleeding, no leaks. Ileostomy and wound manager with soft semi-formed output, brown in color; perc cony capped.  Extremities: warm, well perfusedpulses          I&O's Detail    25 Jan 2024 07:01  -  26 Jan 2024 07:00  --------------------------------------------------------  IN:    Fat Emulsion (Fish Oil &amp; Plant Based) 20% Infusion: 208 mL    IV PiggyBack: 350 mL    IV PiggyBack: 190 mL    Oral Fluid: 550 mL    TPN (Total Parenteral Nutrition): 1977 mL  Total IN: 3275 mL    OUT:    Drain (mL): 50 mL    Drain (mL): 550 mL    Voided (mL): 5350 mL  Total OUT: 5950 mL    Total NET: -2675 mL          LABS:                        7.3    8.97  )-----------( 142      ( 26 Jan 2024 05:53 )             23.7     01-26    143  |  105  |  44<H>  ----------------------------<  135<H>  4.6   |  32<H>  |  1.34<H>    Ca    8.1<L>      26 Jan 2024 05:53  Phos  2.9     01-25  Mg     1.9     01-25    TPro  8.4<H>  /  Alb  2.7<L>  /  TBili  4.8<H>  /  DBili  3.1<H>  /  AST  97<H>  /  ALT  64<H>  /  AlkPhos  114  01-26    PT/INR - ( 25 Jan 2024 05:30 )   PT: 14.7 sec;   INR: 1.30          PTT - ( 25 Jan 2024 05:30 )  PTT:36.6 sec  Urinalysis Basic - ( 26 Jan 2024 05:53 )    Color: x / Appearance: x / SG: x / pH: x  Gluc: 135 mg/dL / Ketone: x  / Bili: x / Urobili: x   Blood: x / Protein: x / Nitrite: x   Leuk Esterase: x / RBC: x / WBC x   Sq Epi: x / Non Sq Epi: x / Bacteria: x

## 2024-01-26 NOTE — ADVANCED PRACTICE NURSE CONSULT - ASSESSMENT
New fistula/wound manager applied over fistula site at mid abdomen. Effluent in pouch very thick, green in color. No bleeding at wound bed when old appliance removed but area remains denuded. Periwound area protected with Cavilon barrier wipes and stoma rings then wound manager applied over area. New 1 piece Coloplast convex appliance placed over ileostomy. After wound manager applied, thin green effluent began draining into pouch. Extra supplies at bedside.

## 2024-01-26 NOTE — PROGRESS NOTE ADULT - TIME BILLING
Managing fungemia
evaluation of febrile illness
evaluation of fever and hypotension
Managing candidemia
Managing fungemia
Overnight event/RTOR noted. Advise continue imipenem and agree with addition of fluconazole for polymicrobial feculent peritonitis.
s/p PTC 9/11/23; bile fluid culture sterile to date. Advise continue imipenem for now while awaiting growth on culture.
Clinically improved. Bile fluid cx sterile to date. Advise complete course of imipenem thru 9/15/23 followed by observation off antibiotics.    Please reconsult with ?
med rec performed  labs reviewed  2 sets of ICU rounds  discussion with patient  multiple visits for SOB
Crohn disease, prolonged hospitalization following SBR for SBO c/b feculent peritonitis s/p RTOR 7/5, now with AMS i/s/o hypoglycemia and GB distention on imaging ?acalculous cholecystitis; now s/p PTC--to f/u bile fluid GS and cx; bcx 9/10 ngtd; for now advise continue imipenem--adjust to 500mg q8h as per ID pharmacist recommendations.
Managing fungemia
evaluation, coordination of care, counseling
management of peritonitis
As above
cholecystostomy tube management
Improving leukocytosis. Candida species identification pending. Advise continue imipenem plus fluconazole.
med rec performed  labs reviewed  2 sets of ICU rounds  patient counseled
case complexity as above.
case complexity as above.
Managing fungemia, CLABSI
cholecystostomy tube management
med rec performed  labs reviewed  coordinated with IM, ID, surgery  2 sets of ICU rounds  POCUS done
evaluation of leukocytosis
treatment of pneumonia

## 2024-01-26 NOTE — PROGRESS NOTE ADULT - ASSESSMENT
78 year old male with a PMHx of Crohn's, AFib/Flutter s/p DCCVs on amiodarone, remote ileocectomy and open appendectomy. Admitted (6/23) for SBO vs Crohns flare, s/p NGT decompression and s/p lap converted to open TRE, SBR x 3, left in discontinuity with abthera vac on (6/27), RTOR for ileocolic resection, small bowel anastomosis, and abdominal wall closure on (6/28), c/b fluid collection s/p IR aspiration of perihepatic fluid on (7/3), c/b wound dehiscence s/p RTOR exlap, washout, ileocolic resection with end ileostomy, blow hole colostomy, red rubber from ileostomy to small bowel anastomosis; vicryl bridging mesh on (7/5) transferred to SICU postoperatively for hemodynamic monitoring, with hospital course complicated by periods of severe ELVI and hyponatremia, which resolved but stepped back up for to SICU on (9/10) for acute AMS, intermittent hypoglycemia, AFib with RVR. Percutaneous Cholecystostomy placed on (9/11) for enlarged GB, PCT capped 10/5 and uncapped 10/25 for hyperbilirubinemia, Right brachial DVT, left basillic and cephalic superficial thrombus on duplex 11/2, CT 11/14 with ALDEN ground glass opacity of unclear etiology, completed empiric 7day cefepime course 11/22, rising T-bili of unclear etiology now w resolved candida glabrata fungemia, ELVI improving. Gattex started on 1/24, responding well to diuresis. Hb dropped to 7.3.      - Will likely need 1 PRBC for downtrending Hb   - Continue TPN  - Continue Gattex   - Team 5 will follow

## 2024-01-26 NOTE — PROGRESS NOTE ADULT - ASSESSMENT
78M w PMH Crohn's, AFib/Flutter s/p DCCVs on amiodarone, remote ileocectomy and open appendectomy. Admitted (6/23) for SBO vs Crohns flare, s/p NGT decompression and s/p lap converted to open TRE, SBR x 3, left in discontinuity with abthera vac on (6/27), RTOR for ileocolic resection, small bowel anastomosis, and abdominal wall closure on (6/28), c/b fluid collection s/p IR aspiration of perihepatic fluid on (7/3), c/b wound dehiscence s/p RTOR exlap, washout, ileocolic resection with end ileostomy, blow hole colostomy, red rubber from ileostomy to small bowel anastomosis; vicryl bridging mesh on (7/5) transferred to SICU postoperatively for hemodynamic monitoring, with hospital course complicated by periods of severe ELVI and hyponatremia, which resolved but stepped back up for to SICU on (9/10) for acute AMS, intermittent hypoglycemia, AFib with RVR. Percutaneous Cholecystostomy placed on (9/11) for enlarged GB, PCT capped 10/5 and uncapped 10/25 for hyperbilirubinemia, Right brachial DVT, left basillic and cephalic superficial thrombus on duplex 11/2, CT 11/14 with ALDEN ground glass opacity of unclear etiology, completed empiric 7day cefepime course 11/22, and another course of 7day cefepime for PNA  (1/15-23). rising T-bili of unclear etiology, resolved candida glabrata fungemia, ELVI improving.     NEURO: Hx of depression: cont Effexor (halved dose in setting of renal dysfunx). Anxiety: Lorazepam PRN. Holistic consult for anxiety and insomnia. Gabapentin for restless leg syndrome.   HEENT: Timolol eye gtt added on 1/10 for additional systemic BB support in setting of malabsorption  CV: Volume overload on exam. been getting lasix for past few days. will dose another lasix 40 x 2 and diamox 250 x 2 today.  Hx of Afib and recent Aflutter alternating with sinus tachycardia (HR 100s): Cont Lopressor to 10 q6h, added timolol eye ggt for attempted systemic affect. Started therapeutic Lovenox on 1/10, but held since 1/15 in setting of dark red output from ileostomy. s/p previous DCCV, off Amiodarone and Lipitor due to persistent transaminitis. BLE duplex (1/5) no DVT, TTE  (7/18) - PASP 64mmHg, EF 65-70%. holding Losartan & norvasc because not absorbing PO meds, TTE (1/4) LVEF 75%, mild/mod AR, PASP 40, RVEF wnl.  PULM: Atelectasis/dyspnea improved clinically: cont 1 hr of BiPAP every 12hrs and nasal canula or room air. PRN duonebs for wheezing. Encourage OOB and IS. CT from 11/14 with ALDEN ground glass opacity of unclear etiology. COVID negative. RVP negative. Completed 7d empiric cefepime 11/22 to cover for potential PNA and another course of cefepime (1/15-23).    GI/FEN: Low res, low fat, high protein diet. Cont Ensure max x1/day however pt likely not absorbing adequately due to proximal fistula. Folate, thiamine. PICC replaced 12/4, switched out PICC on 1/4. 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 Transaminitis slowly improved, elevated T bili, s/p Perc Deb on 9/11, uncapped on 1/15, recap on (1/17). been seen by Hepatology - MRCP 10/30 no obstruction, cont ursodiol, RUQ US 11/25 CBD 5mm, hepatic vessels patent. Cont Vit D weekly.   : Voids. ELVI improved: stopped famotidine given renal dysfunction. MARLO Duplex and RP US unremarkable, CK wnl.    ENDO: Hx hypothyroid: IV Synthroid,  Repeat thyroid studies WNL 1/3/24.   ID: currently not on any abx. completed cefepime for PNA (1/15-23) BCx 11/22 grew candida glabrata, likely CLABSI. s/p Caspo & completed Fluconazole course (12/1-12/9). Ophthalmology evaluated - normal ocular exam. Echo no vegetation. PICC replaced on 12/4. Cont Nystatin powder. // DC: caspofungin (11/24-12/1). empiric 7days cefepime (11/15-11/22), C. tertium, Lactobacillus from IR cx 7/3, and candida albicans, lactobacillus, vanc sensitive E faecium, vanc resistant E gallinarum, vanc resistant E casseliflavus, lactobacillus from OR cx 7/5. Completed course of abx with Imipenem (9/10-9/15). Imipenem (8/26--) imipenem (6/30-7/12, 7/23-7/30), Dapto (6/30-7/5 and 7/23-7/24). Empiric Dapto (8/23-24) and Cefepime (8/23-24).   PPX: SCDs, held therapeutic Lovenox for Afib in setting of dark blood from ileostomy, currently on PPX lovenox given hx of thrombosis.   HEME: Anemia - continue Epogen weekly. S/p Iron Sucrose 200 qd x 3 days for chronic anemia. recurrent drop in Hgb requiring intermittent unit PRBC. Hgb down to 7.3 again today. transfuse 1U PRBC today  LINES: PIVs, RUE PICC placed (1/5--) will plan to switch PICC once a month in setting of fungemia history, next switch will be around 2/5 // DC: LUE PICC (9/1-11/1 ), RUE PICC: (11/1-11/24), LUE PICC (12/4-1/5)  WOUNDS/DRAINS: Abdominal wound and fistula with wound manager in place dressing change daily. Had recurrent punctate bleeding at wound bed, s/p placed surgicel, attempt to stitch, electrocautery and epi soaked gauze. Cont epinephrine soaked gauze at bedside as needed for wound bleeding. RLQ Ostomy. IR perc deb tube recapped 1/17 // DC: L Clay drain.  PT: Ambulate as tolerated OOB to chair daily.  DISPO: SICU due to complicated hospital course. but will downgrade labs to every other day

## 2024-01-26 NOTE — PROGRESS NOTE ADULT - NS ATTEND AMEND GEN_ALL_CORE FT
UC, AF, s/p SBR, ileocolic resection, ileostomy, enteroatmospheric fistula, cholecystostomy, anemia  physical as above  TPN written  lasix BID daily for now with BID acetazolamide aiming for negative fluid balance  the BUN is slightly higher but will tolerate this for now given clinical fluid overload  NIV as needed and overnight  continue metoprolol and timolol  transfuse today  gattex continues

## 2024-01-26 NOTE — PROGRESS NOTE ADULT - SUBJECTIVE AND OBJECTIVE BOX
SUBJECTIVE: Patient seen and examined at bedside this AM by surgery team. The patient feels well this AM, mild SOB improved after Lasix last night.     MEDICATIONS  (STANDING):  chlorhexidine 2% Cloths 1 Application(s) Topical <User Schedule>  cholestyramine Powder (Sugar-Free) 4 Gram(s) Oral daily  enoxaparin Injectable 40 milliGRAM(s) SubCutaneous every 24 hours  epoetin matt-epbx (RETACRIT) Injectable 19780 Unit(s) SubCutaneous every 7 days  ergocalciferol 14700 Unit(s) Oral <User Schedule>  gabapentin 100 milliGRAM(s) Oral at bedtime  glucagon  Injectable 1 milliGRAM(s) IntraMuscular once  levothyroxine Injectable 30 MICROGram(s) IV Push at bedtime  lipid, fat emulsion (Fish Oil and Plant Based) 20% Infusion 0.5319 Gm/kG/Day (20.83 mL/Hr) IV Continuous <Continuous>  melatonin 5 milliGRAM(s) Oral at bedtime  metoprolol tartrate Injectable 10 milliGRAM(s) IV Push every 6 hours  Parenteral Nutrition - Adult 1 Each TPN Continuous <Continuous>  teduglutide Injectable 4.7 milliGRAM(s) SubCutaneous every 24 hours  timolol 0.5% Solution 1 Drop(s) Both EYES daily  ursodiol Suspension 300 milliGRAM(s) Oral every 8 hours  venlafaxine XR. 150 milliGRAM(s) Oral daily    MEDICATIONS  (PRN):  chlorhexidine 0.12% Liquid 15 milliLiter(s) Swish and Spit two times a day PRN oral hygeine  EPINEPHrine   1 mG/mL (1:1,000) Topical Solution 1 Application(s) Topical every 24 hours PRN for wound bleeding  LORazepam   Injectable 1 milliGRAM(s) IV Push <User Schedule> PRN Anxiety  ondansetron Injectable 4 milliGRAM(s) IV Push every 6 hours PRN Nausea and/or Vomiting      Vital Signs Last 24 Hrs  T(C): 36.3 (26 Jan 2024 05:12), Max: 36.9 (25 Jan 2024 12:00)  T(F): 97.3 (26 Jan 2024 05:12), Max: 98.5 (25 Jan 2024 12:00)  HR: 96 (26 Jan 2024 07:00) (93 - 117)  BP: 153/79 (26 Jan 2024 07:00) (116/63 - 164/79)  BP(mean): 108 (26 Jan 2024 07:00) (84 - 113)  RR: 24 (26 Jan 2024 07:00) (14 - 38)  SpO2: 100% (26 Jan 2024 07:00) (90% - 100%)    Parameters below as of 26 Jan 2024 08:00  Patient On (Oxygen Delivery Method): BiPAP/CPAP    O2 Concentration (%): 40    Physical Exam  General: Patient is doing well and sitting in bed comfortably  Constitutional: alert and oriented   Pulm: no respiratory distress  CV: Regular rate  Abd: soft, nontender, nondistended. No rebound, no guarding. Wound manager in place, no bleeding, no leaks. soft semi-formed output; Perc cony capped; Ileostomy with minimal output  Extremities: warm, well perfused, mild peripheral edema    I&O's Detail    25 Jan 2024 07:01  -  26 Jan 2024 07:00  --------------------------------------------------------  IN:    Fat Emulsion (Fish Oil &amp; Plant Based) 20% Infusion: 208 mL    IV PiggyBack: 350 mL    IV PiggyBack: 190 mL    Oral Fluid: 550 mL    TPN (Total Parenteral Nutrition): 1977 mL  Total IN: 3275 mL    OUT:    Drain (mL): 50 mL    Drain (mL): 550 mL    Voided (mL): 5350 mL  Total OUT: 5950 mL    Total NET: -2675 mL      26 Jan 2024 07:01  -  26 Jan 2024 07:45  --------------------------------------------------------  IN:    TPN (Total Parenteral Nutrition): 153 mL  Total IN: 153 mL    OUT:  Total OUT: 0 mL    Total NET: 153 mL        LABS:                        7.3    8.97  )-----------( 142      ( 26 Jan 2024 05:53 )             23.7     01-26    143  |  105  |  44<H>  ----------------------------<  135<H>  4.6   |  32<H>  |  1.34<H>    Ca    8.1<L>      26 Jan 2024 05:53  Phos  2.9     01-25  Mg     1.9     01-25    TPro  8.4<H>  /  Alb  2.7<L>  /  TBili  4.8<H>  /  DBili  3.1<H>  /  AST  97<H>  /  ALT  64<H>  /  AlkPhos  114  01-26    PT/INR - ( 25 Jan 2024 05:30 )   PT: 14.7 sec;   INR: 1.30          PTT - ( 25 Jan 2024 05:30 )  PTT:36.6 sec  Urinalysis Basic - ( 26 Jan 2024 05:53 )    Color: x / Appearance: x / SG: x / pH: x  Gluc: 135 mg/dL / Ketone: x  / Bili: x / Urobili: x   Blood: x / Protein: x / Nitrite: x   Leuk Esterase: x / RBC: x / WBC x   Sq Epi: x / Non Sq Epi: x / Bacteria: x

## 2024-01-26 NOTE — PROGRESS NOTE ADULT - SUBJECTIVE AND OBJECTIVE BOX
S: pt says he feels very tired, but his breathing is fine. no sob       O: ICU Vital Signs Last 24 Hrs  T(F): 97.4 (01-26-24 @ 09:00), Max: 98.5 (01-25-24 @ 12:00)  HR: 84 (01-26-24 @ 11:00) (84 - 117)  BP: 122/71 (01-26-24 @ 11:00) (116/86 - 164/79)  BP(mean): 91 (01-26-24 @ 11:00) (88 - 113)  ABP: --  RR: 17 (01-26-24 @ 11:00) (17 - 38)  SpO2: 100% (01-26-24 @ 11:00) (90% - 100%)    PHYSICAL EXAM:   Neurological: AAOx3, CNII-XII intact,  strength 5/5 b/l  ENT: mucus membrane moist  Cardiovascular: RRR  Respiratory: CTA  Gastrointestinal: soft, NT, ND, BS+, no bleeding from either ileostomy and fistula, thick light brownish content from both fistula and ileostomy.   Extremities: warm, trace dependent edema  Vascular: no cyanosis/erythema  Skin: no rashes  MSK: no joint swelling.     LABS:    01-26    143  |  105  |  44<H>  ----------------------------<  135<H>  4.6   |  32<H>  |  1.34<H>    Ca    8.1<L>      26 Jan 2024 05:53  Phos  2.9     01-26  Mg     2.1     01-26    TPro  8.4<H>  /  Alb  2.7<L>  /  TBili  4.8<H>  /  DBili  3.1<H>  /  AST  97<H>  /  ALT  64<H>  /  AlkPhos  114  01-26  LIVER FUNCTIONS - ( 26 Jan 2024 05:53 )  Alb: 2.7 g/dL / Pro: 8.4 g/dL / ALK PHOS: 114 U/L / ALT: 64 U/L / AST: 97 U/L / GGT: x                               7.3    8.97  )-----------( 142      ( 26 Jan 2024 05:53 )             23.7   PT/INR - ( 25 Jan 2024 05:30 )   PT: 14.7 sec;   INR: 1.30          PTT - ( 25 Jan 2024 05:30 )  PTT:36.6 sec  Urinalysis Basic - ( 26 Jan 2024 05:53 )    Color: x / Appearance: x / SG: x / pH: x  Gluc: 135 mg/dL / Ketone: x  / Bili: x / Urobili: x   Blood: x / Protein: x / Nitrite: x   Leuk Esterase: x / RBC: x / WBC x   Sq Epi: x / Non Sq Epi: x / Bacteria: x    CAPILLARY BLOOD GLUCOSE        MEDICATIONS  (STANDING):  acetaZOLAMIDE  IVPB 250 milliGRAM(s) IV Intermittent every 12 hours  chlorhexidine 2% Cloths 1 Application(s) Topical <User Schedule>  cholestyramine Powder (Sugar-Free) 4 Gram(s) Oral daily  enoxaparin Injectable 40 milliGRAM(s) SubCutaneous every 24 hours  epoetin matt-epbx (RETACRIT) Injectable 32215 Unit(s) SubCutaneous every 7 days  ergocalciferol 76466 Unit(s) Oral <User Schedule>  furosemide   Injectable 40 milliGRAM(s) IV Push every 6 hours  gabapentin 100 milliGRAM(s) Oral at bedtime  glucagon  Injectable 1 milliGRAM(s) IntraMuscular once  levothyroxine Injectable 30 MICROGram(s) IV Push at bedtime  lipid, fat emulsion (Fish Oil and Plant Based) 20% Infusion 0.5319 Gm/kG/Day (20.83 mL/Hr) IV Continuous <Continuous>  melatonin 5 milliGRAM(s) Oral at bedtime  metoprolol tartrate Injectable 10 milliGRAM(s) IV Push every 6 hours  Parenteral Nutrition - Adult 1 Each TPN Continuous <Continuous>  Parenteral Nutrition - Adult 1 Each TPN Continuous <Continuous>  teduglutide Injectable 4.7 milliGRAM(s) SubCutaneous every 24 hours  timolol 0.5% Solution 1 Drop(s) Both EYES daily  ursodiol Suspension 300 milliGRAM(s) Oral every 8 hours  venlafaxine XR. 150 milliGRAM(s) Oral daily    MEDICATIONS  (PRN):  chlorhexidine 0.12% Liquid 15 milliLiter(s) Swish and Spit two times a day PRN oral hygeine  EPINEPHrine   1 mG/mL (1:1,000) Topical Solution 1 Application(s) Topical every 24 hours PRN for wound bleeding  LORazepam   Injectable 1 milliGRAM(s) IV Push <User Schedule> PRN Anxiety  ondansetron Injectable 4 milliGRAM(s) IV Push every 6 hours PRN Nausea and/or Vomiting      Poole:	  [ x] None	[ ] Daily Poole Order Placed	   Indication:	  [ ] Strict I and O's    [ ] Obstruction     [ ] Incontinence + Stage 3 or 4 Decubitus  Central Line:  [ ] None	   [x ]  Medication / TPN Administration     [ ] No Peripheral IV

## 2024-01-27 NOTE — PROGRESS NOTE ADULT - ASSESSMENT
78M w PMH Crohn's, AFib/Flutter s/p DCCVs on amiodarone, remote ileocectomy and open appendectomy. Admitted (6/23) for SBO vs Crohns flare, s/p NGT decompression and s/p lap converted to open TRE, SBR x 3, left in discontinuity with abthera vac on (6/27), RTOR for ileocolic resection, small bowel anastomosis, and abdominal wall closure on (6/28), c/b fluid collection s/p IR aspiration of perihepatic fluid on (7/3), c/b wound dehiscence s/p RTOR exlap, washout, ileocolic resection with end ileostomy, blow hole colostomy, red rubber from ileostomy to small bowel anastomosis; vicryl bridging mesh on (7/5) transferred to SICU postoperatively for hemodynamic monitoring, with hospital course complicated by periods of severe ELVI and hyponatremia, which resolved but stepped back up for to SICU on (9/10) for acute AMS, intermittent hypoglycemia, AFib with RVR. Percutaneous Cholecystostomy placed on (9/11) for enlarged GB, PCT capped 10/5 and uncapped 10/25 for hyperbilirubinemia, Right brachial DVT, left basillic and cephalic superficial thrombus on duplex 11/2, CT 11/14 with ALDEN ground glass opacity of unclear etiology, completed empiric 7day cefepime course 11/22, and another course of 7day cefepime for PNA  (1/15-23). rising T-bili of unclear etiology, resolved candida glabrata fungemia, ELVI improving.     NEURO: Hx of depression: cont Effexor (halved dose in setting of renal dysfunx). Anxiety: Lorazepam PRN. Holistic consult for anxiety and insomnia. Gabapentin for restless leg syndrome.   HEENT: Timolol eye gtt added on 1/10 for additional systemic BB support in setting of malabsorption  CV: been getting lasix for past few days for volume overload. will dose lasix IV 40 x 1 today.  Hx of Afib and recent Aflutter alternating with sinus tachycardia (HR 100s): Cont Lopressor to 10 q6h, added timolol eye ggt for attempted systemic affect. Started therapeutic Lovenox on 1/10, but held since 1/15 in setting of dark red output from ileostomy. s/p previous DCCV, off Amiodarone and Lipitor due to persistent transaminitis. BLE duplex (1/5) no DVT, TTE  (7/18) - PASP 64mmHg, EF 65-70%. holding Losartan & norvasc because not absorbing PO meds, TTE (1/4) LVEF 75%, mild/mod AR, PASP 40, RVEF wnl.  PULM: Atelectasis/dyspnea improved clinically: cont 1 hr of BiPAP every 12hrs and nasal canula or room air. PRN duonebs for wheezing. Encourage OOB and IS. CT from 11/14 with ALDEN ground glass opacity of unclear etiology. COVID negative. RVP negative. Completed 7d empiric cefepime 11/22 to cover for potential PNA and another course of cefepime (1/15-1/23).    GI/FEN: Low res, low fat, high protein diet. Cont Ensure max x1/day however pt likely not absorbing adequately due to proximal fistula. Folate, thiamine. PICC replaced 12/4, switched out PICC on 1/4. 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 Transaminitis slowly improved, elevated T bili, s/p Perc Deb on 9/11, uncapped on 1/15, recap on (1/17). been seen by Hepatology - MRCP 10/30 no obstruction, cont ursodiol, RUQ US 11/25 CBD 5mm, hepatic vessels patent. Cont Vit D weekly.   : Voids. ELVI improved: stopped famotidine given renal dysfunction. MARLO Duplex and RP US unremarkable, CK wnl.    ENDO: Hx hypothyroid: IV Synthroid,  Repeat thyroid studies WNL 1/3/24.   ID: currently not on any abx. completed cefepime for PNA (1/15-23) BCx 11/22 grew candida glabrata, likely CLABSI. s/p Caspo & completed Fluconazole course (12/1-12/9). Ophthalmology evaluated - normal ocular exam. Echo no vegetation. PICC replaced on 12/4. Cont Nystatin powder. // DC: caspofungin (11/24-12/1). empiric 7days cefepime (11/15-11/22), C. tertium, Lactobacillus from IR cx 7/3, and candida albicans, lactobacillus, vanc sensitive E faecium, vanc resistant E gallinarum, vanc resistant E casseliflavus, lactobacillus from OR cx 7/5. Completed course of abx with Imipenem (9/10-9/15). Imipenem (8/26--) imipenem (6/30-7/12, 7/23-7/30), Dapto (6/30-7/5 and 7/23-7/24). Empiric Dapto (8/23-24) and Cefepime (8/23-24).   PPX: SCDs, held therapeutic Lovenox for Afib in setting of dark blood from ileostomy, currently on PPX lovenox given hx of thrombosis.   HEME: Anemia - continue Epogen weekly. S/p Iron Sucrose 200 qd x 3 days for chronic anemia. recurrent drop in Hgb requiring intermittent unit PRBC. given 1U PRBC for hgb 7.3 yesterday, repeat Hgb 8.9 this morning, macrocytic anemia, recent arlene b12 was 671 from 1/23. will folate was >20 from 10/28, will recheck folate  LINES: PIVs, RUE PICC placed (1/5--) will plan to switch PICC once a month in setting of fungemia history, next switch will be around 2/5 // DC: LUE PICC (9/1-11/1 ), RUE PICC: (11/1-11/24), LUE PICC (12/4-1/5)  WOUNDS/DRAINS: Abdominal wound and fistula with wound manager in place dressing change daily. Had recurrent punctate bleeding at wound bed, s/p placed surgicel, attempt to stitch, electrocautery and epi soaked gauze. Cont epinephrine soaked gauze at bedside as needed for wound bleeding. RLQ Ostomy. IR perc deb tube recapped 1/17 // DC: L Clay drain.  PT: Ambulate as tolerated OOB to chair daily.  DISPO: SICU due to complicated hospital course. but will downgrade labs to every other day

## 2024-01-27 NOTE — PROGRESS NOTE ADULT - SUBJECTIVE AND OBJECTIVE BOX
Patient evaluated with chief resident during AM rounds    STATUS POST:  Exploratory laparotomy    Laparoscopic lysis of intestinal adhesions    Exploratory laparotomy    Open lysis of intestinal adhesions    Resection of small bowel    Temporary closure of abdominal cavity    Repair, anastomosis, ileocolic    Small bowel resection with anastomosis    Flap, myocutaneous, abdominal wall    Reconstruction of abdominal wall using mesh    Washout of abdominal cavity    Creation of ileostomy    Creation of colostomy    Laparoscopic lysis of intestinal adhesions    Open lysis of intestinal adhesions    Resection of small bowel    Temporary closure of abdominal cavity    Repair, anastomosis, ileocolic    Small bowel resection with anastomosis    Flap, myocutaneous, abdominal wall    Reconstruction of abdominal wall using mesh    Washout of abdominal cavity        Overnight Events: NAEON  SUBJECTIVE: Otherwise comfortable. Looking forward to family visit.     Denies Chest Pain, Difficulty breathing, Calf Pain, Headache, Dizziness    OBJECTIVE:  Vital Signs Last 24 Hrs  T(C): 37 (27 Jan 2024 01:03), Max: 37 (27 Jan 2024 01:03)  T(F): 98.6 (27 Jan 2024 01:03), Max: 98.6 (27 Jan 2024 01:03)  HR: 87 (27 Jan 2024 07:00) (84 - 96)  BP: 140/68 (27 Jan 2024 07:00) (112/56 - 163/78)  BP(mean): 95 (27 Jan 2024 07:00) (80 - 115)  RR: 15 (27 Jan 2024 07:00) (15 - 30)  SpO2: 100% (27 Jan 2024 07:00) (95% - 100%)    Parameters below as of 27 Jan 2024 07:00  Patient On (Oxygen Delivery Method): BiPAP/CPAP    O2 Concentration (%): 40    I&O's Summary    26 Jan 2024 07:01  -  27 Jan 2024 07:00  --------------------------------------------------------  IN: 2991.7 mL / OUT: 6895 mL / NET: -3903.3 mL        Physical Exam:  General Appearance: Appears well, NAD  Pulmonary: Nonlabored breathing, no respiratory distress  Cardiovascular: NSR  Abdomen:Soft, non tender, non-distended. WOund manager present, no obvious leaking. Soft output, capped percutaneous cholecystotomy, ileostomy with minimal output.   Extremities: [facility]     LABS:                        7.3    8.97  )-----------( 142      ( 26 Jan 2024 05:53 )             23.7     01-26    143  |  105  |  44<H>  ----------------------------<  135<H>  4.6   |  32<H>  |  1.34<H>    Ca    8.1<L>      26 Jan 2024 05:53  Phos  2.9     01-26  Mg     2.1     01-26    TPro  8.4<H>  /  Alb  2.7<L>  /  TBili  4.8<H>  /  DBili  3.1<H>  /  AST  97<H>  /  ALT  64<H>  /  AlkPhos  114  01-26      Urinalysis Basic - ( 26 Jan 2024 05:53 )    Color: x / Appearance: x / SG: x / pH: x  Gluc: 135 mg/dL / Ketone: x  / Bili: x / Urobili: x   Blood: x / Protein: x / Nitrite: x   Leuk Esterase: x / RBC: x / WBC x   Sq Epi: x / Non Sq Epi: x / Bacteria: x

## 2024-01-27 NOTE — PROGRESS NOTE ADULT - SUBJECTIVE AND OBJECTIVE BOX
S: No new issues/events overnight, no new med c/o    O: ICU Vital Signs Last 24 Hrs  T(F): 98 (01-27-24 @ 08:28), Max: 98.6 (01-27-24 @ 01:03)  HR: 94 (01-27-24 @ 09:00) (84 - 96)  BP: 114/60 (01-27-24 @ 09:00) (112/56 - 163/78)  BP(mean): 81 (01-27-24 @ 09:00) (80 - 115)  ABP: --  RR: 19 (01-27-24 @ 09:00) (15 - 30)  SpO2: 100% (01-27-24 @ 09:00) (95% - 100%)    PHYSICAL EXAM:   Neurological: AAOx3, CNII-XII intact,  strength 5/5 b/l  ENT: mucus membrane moist  Cardiovascular: RRR  Respiratory: CTA  Gastrointestinal: soft, NT, ND, BS+, no bleeding from either ileostomy and fistula, thick light brownish content from both fistula and ileostomy.   Extremities: warm, trace dependent edema  Vascular: no cyanosis/erythema  Skin: no rashes  MSK: no joint swelling.       LABS:    01-27    144  |  110<H>  |  49<H>  ----------------------------<  117<H>  5.1   |  28  |  1.30    Ca    8.6      27 Jan 2024 07:20  Phos  2.8     01-27  Mg     2.2     01-27    TPro  8.7<H>  /  Alb  2.8<L>  /  TBili  5.1<H>  /  DBili  3.2<H>  /  AST  103<H>  /  ALT  66<H>  /  AlkPhos  124<H>  01-27  LIVER FUNCTIONS - ( 27 Jan 2024 07:21 )  Alb: 2.8 g/dL / Pro: 8.7 g/dL / ALK PHOS: 124 U/L / ALT: 66 U/L / AST: 103 U/L / GGT: x                               8.9    9.35  )-----------( 150      ( 27 Jan 2024 07:21 )             29.0     Urinalysis Basic - ( 27 Jan 2024 07:20 )    Color: x / Appearance: x / SG: x / pH: x  Gluc: 117 mg/dL / Ketone: x  / Bili: x / Urobili: x   Blood: x / Protein: x / Nitrite: x   Leuk Esterase: x / RBC: x / WBC x   Sq Epi: x / Non Sq Epi: x / Bacteria: x    CAPILLARY BLOOD GLUCOSE        MEDICATIONS  (STANDING):  chlorhexidine 2% Cloths 1 Application(s) Topical <User Schedule>  cholestyramine Powder (Sugar-Free) 4 Gram(s) Oral daily  enoxaparin Injectable 40 milliGRAM(s) SubCutaneous every 24 hours  epoetin matt-epbx (RETACRIT) Injectable 05990 Unit(s) SubCutaneous every 7 days  ergocalciferol 96014 Unit(s) Oral <User Schedule>  gabapentin 100 milliGRAM(s) Oral at bedtime  glucagon  Injectable 1 milliGRAM(s) IntraMuscular once  levothyroxine Injectable 30 MICROGram(s) IV Push at bedtime  lipid, fat emulsion (Fish Oil and Plant Based) 20% Infusion 0.5319 Gm/kG/Day (20.83 mL/Hr) IV Continuous <Continuous>  lipid, fat emulsion (Fish Oil and Plant Based) 20% Infusion 0.5319 Gm/kG/Day (20.83 mL/Hr) IV Continuous <Continuous>  melatonin 5 milliGRAM(s) Oral at bedtime  metoprolol tartrate Injectable 10 milliGRAM(s) IV Push every 6 hours  Parenteral Nutrition - Adult 1 Each TPN Continuous <Continuous>  Parenteral Nutrition - Adult 1 Each TPN Continuous <Continuous>  teduglutide Injectable 4.7 milliGRAM(s) SubCutaneous every 24 hours  timolol 0.5% Solution 1 Drop(s) Both EYES daily  ursodiol Suspension 300 milliGRAM(s) Oral every 8 hours  venlafaxine XR. 150 milliGRAM(s) Oral daily    MEDICATIONS  (PRN):  chlorhexidine 0.12% Liquid 15 milliLiter(s) Swish and Spit two times a day PRN oral hygeine  EPINEPHrine   1 mG/mL (1:1,000) Topical Solution 1 Application(s) Topical every 24 hours PRN for wound bleeding  LORazepam   Injectable 1 milliGRAM(s) IV Push <User Schedule> PRN Anxiety  ondansetron Injectable 4 milliGRAM(s) IV Push every 6 hours PRN Nausea and/or Vomiting      Poole:	  [ x] None	[ ] Daily Poole Order Placed	   Indication:	  [ ] Strict I and O's    [ ] Obstruction     [ ] Incontinence + Stage 3 or 4 Decubitus  Central Line:  [ ] None	   [x ]  Medication / TPN Administration     [ ] No Peripheral IV

## 2024-01-27 NOTE — PROGRESS NOTE ADULT - ASSESSMENT
78 year old male with a PMHx of Crohn's, AFib/Flutter s/p DCCVs on amiodarone, remote ileocectomy and open appendectomy. Admitted (6/23) for SBO vs Crohns flare, s/p NGT decompression and s/p lap converted to open TRE, SBR x 3, left in discontinuity with abthera vac on (6/27), RTOR for ileocolic resection, small bowel anastomosis, and abdominal wall closure on (6/28), c/b fluid collection s/p IR aspiration of perihepatic fluid on (7/3), c/b wound dehiscence s/p RTOR exlap, washout, ileocolic resection with end ileostomy, blow hole colostomy, red rubber from ileostomy to small bowel anastomosis; vicryl bridging mesh on (7/5) transferred to SICU postoperatively for hemodynamic monitoring, with hospital course complicated by periods of severe ELVI and hyponatremia, which resolved but stepped back up for to SICU on (9/10) for acute AMS, intermittent hypoglycemia, AFib with RVR. Percutaneous Cholecystostomy placed on (9/11) for enlarged GB, PCT capped 10/5 and uncapped 10/25 for hyperbilirubinemia, Right brachial DVT, left basillic and cephalic superficial thrombus on duplex 11/2, CT 11/14 with ALDEN ground glass opacity of unclear etiology, completed empiric 7day cefepime course 11/22, rising T-bili of unclear etiology now w resolved candida glabrata fungemia, ELVI improving. Gattex started on 1/24    - Continue TPN  - Continue Gattex   - Team 5 will follow

## 2024-01-27 NOTE — PROGRESS NOTE ADULT - ATTENDING COMMENTS
JOSEPH Pal has acted as my scribe. TPN ordered and increase free water. No evidence of bleeding today. Transfuse per surgery.

## 2024-01-27 NOTE — PROGRESS NOTE ADULT - ATTENDING COMMENTS
CRS Attending coverage for Dr Peterson  Seen on rounds  Discussed with housestaff  Patient is without complaints at present.  Diuresed.  On Gattex, started earlier this week.  Eating. Also on TPN.  Local wound care with wound manager  Continue ICU care.

## 2024-01-27 NOTE — PROGRESS NOTE ADULT - ASSESSMENT
Patient's son Art came into office today with concerns of patient's tounge swelling when he takes metoprolol tartrate.  Son states that when patient takes this, tongue swells and he is unable to talk.  Patient has been taking for over a year with no previous complaints.  Son states that patient does not have any trouble breathing or shortness of breath when this occurs.  No new medications that son is aware of.  Symptom resolves spontaneously.  Son advised to hold medication until writer discusses with MD.     Stable this am

## 2024-01-28 NOTE — PROGRESS NOTE ADULT - ASSESSMENT
78 year old male with a PMHx of Crohn's, AFib/Flutter s/p DCCVs on amiodarone, remote ileocectomy and open appendectomy. Admitted (6/23) for SBO vs Crohns flare, s/p NGT decompression and s/p lap converted to open TRE, SBR x 3, left in discontinuity with abthera vac on (6/27), RTOR for ileocolic resection, small bowel anastomosis, and abdominal wall closure on (6/28), c/b fluid collection s/p IR aspiration of perihepatic fluid on (7/3), c/b wound dehiscence s/p RTOR exlap, washout, ileocolic resection with end ileostomy, blow hole colostomy, red rubber from ileostomy to small bowel anastomosis; vicryl bridging mesh on (7/5) transferred to SICU postoperatively for hemodynamic monitoring, with hospital course complicated by periods of severe ELVI and hyponatremia, which resolved but stepped back up for to SICU on (9/10) for acute AMS, intermittent hypoglycemia, AFib with RVR. Percutaneous Cholecystostomy placed on (9/11) for enlarged GB, PCT capped 10/5 and uncapped 10/25 for hyperbilirubinemia, Right brachial DVT, left basillic and cephalic superficial thrombus on duplex 11/2, CT 11/14 with ALDEN ground glass opacity of unclear etiology, completed empiric 7day cefepime course 11/22, rising T-bili of unclear etiology now w resolved candida glabrata fungemia, ELVI improving. Gattex started on 1/24    - Continue TPN  - Continue Gattex   - Rest of care per SICU

## 2024-01-28 NOTE — PROGRESS NOTE ADULT - ATTENDING COMMENTS
CRS Attending coverage for Dr Peterson  Discussed with SICU team and housestaff  Continue supportive care.

## 2024-01-28 NOTE — PROGRESS NOTE ADULT - SUBJECTIVE AND OBJECTIVE BOX
S: No new issues/events overnight, no new med c/o    O: ICU Vital Signs Last 24 Hrs  T(F): 98 (01-28-24 @ 08:12), Max: 98.6 (01-28-24 @ 00:21)  HR: 93 (01-28-24 @ 09:00) (91 - 95)  BP: 137/70 (01-28-24 @ 09:00) (105/56 - 166/72)  BP(mean): 96 (01-28-24 @ 09:00) (75 - 109)  ABP: --  RR: 20 (01-28-24 @ 09:00) (18 - 37)  SpO2: 100% (01-28-24 @ 09:00) (91% - 100%)    PHYSICAL EXAM:   Neurological: AAOx3, CNII-XII intact,  strength 5/5 b/l  ENT: mucus membrane moist  Cardiovascular: RRR  Respiratory: CTA  Gastrointestinal: soft, NT, ND, BS+, no bleeding from either ileostomy and fistula, thick light brownish content from both fistula and ileostomy. no bleeding in either  Extremities: warm, dependent edema improved compared with yesterday exam.   Vascular: no cyanosis/erythema  Skin: no rashes  MSK: no joint swelling.       LABS:    01-28    137  |  104  |  47<H>  ----------------------------<  127<H>  4.2   |  26  |  1.35<H>    Ca    8.2<L>      28 Jan 2024 04:54  Phos  3.9     01-28  Mg     2.0     01-28    TPro  7.8  /  Alb  2.4<L>  /  TBili  4.5<H>  /  DBili  3.0<H>  /  AST  88<H>  /  ALT  58<H>  /  AlkPhos  114  01-28  LIVER FUNCTIONS - ( 28 Jan 2024 04:54 )  Alb: 2.4 g/dL / Pro: 7.8 g/dL / ALK PHOS: 114 U/L / ALT: 58 U/L / AST: 88 U/L / GGT: x                               8.9    9.35  )-----------( 150      ( 27 Jan 2024 07:21 )             29.0     Urinalysis Basic - ( 28 Jan 2024 04:54 )    Color: x / Appearance: x / SG: x / pH: x  Gluc: 127 mg/dL / Ketone: x  / Bili: x / Urobili: x   Blood: x / Protein: x / Nitrite: x   Leuk Esterase: x / RBC: x / WBC x   Sq Epi: x / Non Sq Epi: x / Bacteria: x    CAPILLARY BLOOD GLUCOSE        MEDICATIONS  (STANDING):  chlorhexidine 2% Cloths 1 Application(s) Topical <User Schedule>  cholestyramine Powder (Sugar-Free) 4 Gram(s) Oral daily  enoxaparin Injectable 40 milliGRAM(s) SubCutaneous every 24 hours  epoetin matt-epbx (RETACRIT) Injectable 37199 Unit(s) SubCutaneous every 7 days  ergocalciferol 83777 Unit(s) Oral <User Schedule>  gabapentin 100 milliGRAM(s) Oral at bedtime  glucagon  Injectable 1 milliGRAM(s) IntraMuscular once  levothyroxine Injectable 30 MICROGram(s) IV Push at bedtime  lipid, fat emulsion (Fish Oil and Plant Based) 20% Infusion 0.5319 Gm/kG/Day (20.83 mL/Hr) IV Continuous <Continuous>  lipid, fat emulsion (Fish Oil and Plant Based) 20% Infusion 0.5319 Gm/kG/Day (20.83 mL/Hr) IV Continuous <Continuous>  melatonin 5 milliGRAM(s) Oral at bedtime  metoprolol tartrate Injectable 10 milliGRAM(s) IV Push every 6 hours  Parenteral Nutrition - Adult 1 Each TPN Continuous <Continuous>  Parenteral Nutrition - Adult 1 Each TPN Continuous <Continuous>  teduglutide Injectable 4.7 milliGRAM(s) SubCutaneous every 24 hours  timolol 0.5% Solution 1 Drop(s) Both EYES daily  ursodiol Suspension 300 milliGRAM(s) Oral every 8 hours  venlafaxine XR. 150 milliGRAM(s) Oral daily    MEDICATIONS  (PRN):  chlorhexidine 0.12% Liquid 15 milliLiter(s) Swish and Spit two times a day PRN oral hygeine  EPINEPHrine   1 mG/mL (1:1,000) Topical Solution 1 Application(s) Topical every 24 hours PRN for wound bleeding  LORazepam   Injectable 1 milliGRAM(s) IV Push <User Schedule> PRN Anxiety  ondansetron Injectable 4 milliGRAM(s) IV Push every 6 hours PRN Nausea and/or Vomiting      Poole:	  [x ] None	[ ] Daily Poole Order Placed	   Indication:	  [ ] Strict I and O's    [ ] Obstruction     [ ] Incontinence + Stage 3 or 4 Decubitus  Central Line:  [ ] None	   [ x]  Medication / TPN Administration     [ ] No Peripheral IV

## 2024-01-28 NOTE — PROGRESS NOTE ADULT - ASSESSMENT
78M w PMH Crohn's, AFib/Flutter s/p DCCVs on amiodarone, remote ileocectomy and open appendectomy. Admitted (6/23) for SBO vs Crohns flare, s/p NGT decompression and s/p lap converted to open TRE, SBR x 3, left in discontinuity with abthera vac on (6/27), RTOR for ileocolic resection, small bowel anastomosis, and abdominal wall closure on (6/28), c/b fluid collection s/p IR aspiration of perihepatic fluid on (7/3), c/b wound dehiscence s/p RTOR exlap, washout, ileocolic resection with end ileostomy, blow hole colostomy, red rubber from ileostomy to small bowel anastomosis; vicryl bridging mesh on (7/5) transferred to SICU postoperatively for hemodynamic monitoring, with hospital course complicated by periods of severe ELVI and hyponatremia, which resolved but stepped back up for to SICU on (9/10) for acute AMS, intermittent hypoglycemia, AFib with RVR. Percutaneous Cholecystostomy placed on (9/11) for enlarged GB, PCT capped 10/5 and uncapped 10/25 for hyperbilirubinemia, Right brachial DVT, left basillic and cephalic superficial thrombus on duplex 11/2, CT 11/14 with ALDEN ground glass opacity of unclear etiology, completed empiric 7day cefepime course 11/22, and another course of 7day cefepime for PNA  (1/15-23). rising T-bili of unclear etiology, resolved candida glabrata fungemia, ELVI improving.     NEURO: Hx of depression: cont Effexor (halved dose in setting of renal dysfunx). Anxiety: Lorazepam PRN. Holistic consult for anxiety and insomnia. Gabapentin for restless leg syndrome.   HEENT: Timolol eye gtt added on 1/10 for additional systemic BB support in setting of malabsorption  CV: been getting lasix for past few days for volume overload. edema much improved. will hold off on diuresis today. Hx of Afib and recent Aflutter alternating with sinus tachycardia (HR 100s): Cont Lopressor to 10 q6h, added timolol eye ggt for attempted systemic affect. Started therapeutic Lovenox on 1/10, but held since 1/15 in setting of dark red output from ileostomy. s/p previous DCCV, off Amiodarone and Lipitor due to persistent transaminitis. BLE duplex (1/5) no DVT, TTE  (7/18) - PASP 64mmHg, EF 65-70%. holding Losartan & norvasc because not absorbing PO meds, TTE (1/4) LVEF 75%, mild/mod AR, PASP 40, RVEF wnl.  PULM: Atelectasis/dyspnea improved clinically: cont 1 hr of BiPAP every 12hrs and nasal canula or room air. PRN duonebs for wheezing. Encourage OOB and IS. CT from 11/14 with ALDEN ground glass opacity of unclear etiology. COVID negative. RVP negative. Completed 7d empiric cefepime 11/22 to cover for potential PNA and another course of cefepime (1/15-1/23).    GI/FEN: Low res, low fat, high protein diet. Cont Ensure max x1/day however pt likely not absorbing adequately due to proximal fistula. Folate, thiamine. PICC replaced 12/4, switched out PICC on 1/4. 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 Transaminitis slowly improved, elevated T bili, s/p Perc Deb on 9/11, uncapped on 1/15, recap on (1/17). been seen by Hepatology - MRCP 10/30 no obstruction, cont ursodiol, RUQ US 11/25 CBD 5mm, hepatic vessels patent. Cont Vit D weekly.   : Voids. ELVI improved: stopped famotidine given renal dysfunction. MARLO Duplex and RP US unremarkable, CK wnl.    ENDO: Hx hypothyroid: IV Synthroid,  Repeat thyroid studies WNL 1/3/24.   ID: currently not on any abx. completed cefepime for PNA (1/15-23) BCx 11/22 grew candida glabrata, likely CLABSI. s/p Caspo & completed Fluconazole course (12/1-12/9). Ophthalmology evaluated - normal ocular exam. Echo no vegetation. PICC replaced on 12/4. Cont Nystatin powder. // DC: caspofungin (11/24-12/1). empiric 7days cefepime (11/15-11/22), C. tertium, Lactobacillus from IR cx 7/3, and candida albicans, lactobacillus, vanc sensitive E faecium, vanc resistant E gallinarum, vanc resistant E casseliflavus, lactobacillus from OR cx 7/5. Completed course of abx with Imipenem (9/10-9/15). Imipenem (8/26--) imipenem (6/30-7/12, 7/23-7/30), Dapto (6/30-7/5 and 7/23-7/24). Empiric Dapto (8/23-24) and Cefepime (8/23-24).   PPX: SCDs, held therapeutic Lovenox for Afib in setting of dark blood from ileostomy, currently on PPX lovenox given hx of thrombosis.   HEME: Anemia - continue Epogen weekly. S/p Iron Sucrose 200 qd x 3 days for chronic anemia. recurrent drop in Hgb requiring intermittent unit PRBC.  repeat Hgb 8.9, macrocytic anemia,  LINES: PIVs, RUE PICC placed (1/5--) will plan to switch PICC once a month in setting of fungemia history, next switch will be around 2/5 // DC: LUE PICC (9/1-11/1 ), RUE PICC: (11/1-11/24), LUE PICC (12/4-1/5)  WOUNDS/DRAINS: Abdominal wound and fistula with wound manager in place dressing change daily. Had recurrent punctate bleeding at wound bed, s/p placed surgicel, attempt to stitch, electrocautery and epi soaked gauze. Cont epinephrine soaked gauze at bedside as needed for wound bleeding. RLQ Ostomy. IR perc deb tube recapped 1/17 // DC: L Clay drain.  PT: Ambulate as tolerated OOB to chair daily.  DISPO: SICU due to complicated hospital course. but will downgrade labs to every other day

## 2024-01-28 NOTE — PROGRESS NOTE ADULT - ATTENDING COMMENTS
Pt without bleeding and VSS. Continue intermittent diuresis. Replace free water in TPN. Pt anxious at times overnight and continue prn benzos and BiPAP.

## 2024-01-28 NOTE — PROGRESS NOTE ADULT - SUBJECTIVE AND OBJECTIVE BOX
INTERVAL HPI/OVERNIGHT EVENTS: Given additional ativan 0.25mg x1 for anxiety.     SUBJECTIVE:      enoxaparin Injectable 40 milliGRAM(s) SubCutaneous every 24 hours  metoprolol tartrate Injectable 10 milliGRAM(s) IV Push every 6 hours      Vital Signs Last 24 Hrs  T(C): 36.6 (28 Jan 2024 05:23), Max: 37 (28 Jan 2024 00:21)  T(F): 97.9 (28 Jan 2024 05:23), Max: 98.6 (28 Jan 2024 00:21)  HR: 93 (28 Jan 2024 05:41) (87 - 95)  BP: 108/56 (28 Jan 2024 05:00) (105/56 - 166/72)  BP(mean): 79 (28 Jan 2024 05:00) (75 - 109)  RR: 20 (28 Jan 2024 05:41) (15 - 37)  SpO2: 100% (28 Jan 2024 05:41) (91% - 100%)    Parameters below as of 28 Jan 2024 05:41  Patient On (Oxygen Delivery Method): BiPAP/CPAP    O2 Concentration (%): 40  I&O's Detail    26 Jan 2024 07:01  -  27 Jan 2024 07:00  --------------------------------------------------------  IN:    Fat Emulsion (Fish Oil &amp; Plant Based) 20% Infusion: 249.7 mL    IV PiggyBack: 100 mL    PRBCs (Packed Red Blood Cells): 340 mL    TPN (Total Parenteral Nutrition): 2302 mL  Total IN: 2991.7 mL    OUT:    Drain (mL): 1275 mL    Drain (mL): 20 mL    Voided (mL): 5600 mL  Total OUT: 6895 mL    Total NET: -3903.3 mL      27 Jan 2024 07:01  -  28 Jan 2024 06:45  --------------------------------------------------------  IN:    Fat Emulsion (Fish Oil &amp; Plant Based) 20% Infusion: 249.6 mL    Oral Fluid: 100 mL    TPN (Total Parenteral Nutrition): 1683 mL  Total IN: 2032.6 mL    OUT:    Drain (mL): 50 mL    Drain (mL): 1015 mL    Voided (mL): 2275 mL  Total OUT: 3340 mL    Total NET: -1307.4 mL          General: NAD, resting comfortably in bed  C/V: NSR  Pulm: Nonlabored breathing, no respiratory distress  Abd: soft, NT/ND.  Extrem: WWP, no edema, SCDs in place  Drains:  Poole:      LABS:                        8.9    9.35  )-----------( 150      ( 27 Jan 2024 07:21 )             29.0     01-28    137  |  104  |  47<H>  ----------------------------<  127<H>  4.2   |  26  |  1.35<H>    Ca    8.2<L>      28 Jan 2024 04:54  Phos  3.9     01-28  Mg     2.0     01-28    TPro  7.8  /  Alb  2.4<L>  /  TBili  4.5<H>  /  DBili  3.0<H>  /  AST  88<H>  /  ALT  58<H>  /  AlkPhos  114  01-28      Urinalysis Basic - ( 28 Jan 2024 04:54 )    Color: x / Appearance: x / SG: x / pH: x  Gluc: 127 mg/dL / Ketone: x  / Bili: x / Urobili: x   Blood: x / Protein: x / Nitrite: x   Leuk Esterase: x / RBC: x / WBC x   Sq Epi: x / Non Sq Epi: x / Bacteria: x        RADIOLOGY & ADDITIONAL STUDIES:   INTERVAL HPI/OVERNIGHT EVENTS: Given additional ativan 0.25mg x1 for anxiety.     SUBJECTIVE: Patient seen and examined at bedside. Patient is feeling well with no acute complaints this morning.      enoxaparin Injectable 40 milliGRAM(s) SubCutaneous every 24 hours  metoprolol tartrate Injectable 10 milliGRAM(s) IV Push every 6 hours      Vital Signs Last 24 Hrs  T(C): 36.6 (28 Jan 2024 05:23), Max: 37 (28 Jan 2024 00:21)  T(F): 97.9 (28 Jan 2024 05:23), Max: 98.6 (28 Jan 2024 00:21)  HR: 93 (28 Jan 2024 05:41) (87 - 95)  BP: 108/56 (28 Jan 2024 05:00) (105/56 - 166/72)  BP(mean): 79 (28 Jan 2024 05:00) (75 - 109)  RR: 20 (28 Jan 2024 05:41) (15 - 37)  SpO2: 100% (28 Jan 2024 05:41) (91% - 100%)    Parameters below as of 28 Jan 2024 05:41  Patient On (Oxygen Delivery Method): BiPAP/CPAP    O2 Concentration (%): 40  I&O's Detail    26 Jan 2024 07:01  -  27 Jan 2024 07:00  --------------------------------------------------------  IN:    Fat Emulsion (Fish Oil &amp; Plant Based) 20% Infusion: 249.7 mL    IV PiggyBack: 100 mL    PRBCs (Packed Red Blood Cells): 340 mL    TPN (Total Parenteral Nutrition): 2302 mL  Total IN: 2991.7 mL    OUT:    Drain (mL): 1275 mL    Drain (mL): 20 mL    Voided (mL): 5600 mL  Total OUT: 6895 mL    Total NET: -3903.3 mL      27 Jan 2024 07:01  -  28 Jan 2024 06:45  --------------------------------------------------------  IN:    Fat Emulsion (Fish Oil &amp; Plant Based) 20% Infusion: 249.6 mL    Oral Fluid: 100 mL    TPN (Total Parenteral Nutrition): 1683 mL  Total IN: 2032.6 mL    OUT:    Drain (mL): 50 mL    Drain (mL): 1015 mL    Voided (mL): 2275 mL  Total OUT: 3340 mL    Total NET: -1307.4 mL        General: NAD, resting comfortably in bed  C/V: NSR  Pulm: Nonlabored breathing, no respiratory distress  Abd: soft, NT/ND, no rebound or guarding, wound manager in place with no bleeding, per cony capped  Extrem: WWP, no edema, SCDs in place      LABS:                        8.9    9.35  )-----------( 150      ( 27 Jan 2024 07:21 )             29.0     01-28    137  |  104  |  47<H>  ----------------------------<  127<H>  4.2   |  26  |  1.35<H>    Ca    8.2<L>      28 Jan 2024 04:54  Phos  3.9     01-28  Mg     2.0     01-28    TPro  7.8  /  Alb  2.4<L>  /  TBili  4.5<H>  /  DBili  3.0<H>  /  AST  88<H>  /  ALT  58<H>  /  AlkPhos  114  01-28      Urinalysis Basic - ( 28 Jan 2024 04:54 )    Color: x / Appearance: x / SG: x / pH: x  Gluc: 127 mg/dL / Ketone: x  / Bili: x / Urobili: x   Blood: x / Protein: x / Nitrite: x   Leuk Esterase: x / RBC: x / WBC x   Sq Epi: x / Non Sq Epi: x / Bacteria: x        RADIOLOGY & ADDITIONAL STUDIES:   INTERVAL HPI/OVERNIGHT EVENTS: Given additional ativan 0.25mg x1 for anxiety.     SUBJECTIVE: Patient seen and examined at bedside. Patient is feeling well with no acute complaints this morning.      enoxaparin Injectable 40 milliGRAM(s) SubCutaneous every 24 hours  metoprolol tartrate Injectable 10 milliGRAM(s) IV Push every 6 hours      Vital Signs Last 24 Hrs  T(C): 36.6 (28 Jan 2024 05:23), Max: 37 (28 Jan 2024 00:21)  T(F): 97.9 (28 Jan 2024 05:23), Max: 98.6 (28 Jan 2024 00:21)  HR: 93 (28 Jan 2024 05:41) (87 - 95)  BP: 108/56 (28 Jan 2024 05:00) (105/56 - 166/72)  BP(mean): 79 (28 Jan 2024 05:00) (75 - 109)  RR: 20 (28 Jan 2024 05:41) (15 - 37)  SpO2: 100% (28 Jan 2024 05:41) (91% - 100%)    Parameters below as of 28 Jan 2024 05:41  Patient On (Oxygen Delivery Method): BiPAP/CPAP    O2 Concentration (%): 40  I&O's Detail    26 Jan 2024 07:01  -  27 Jan 2024 07:00  --------------------------------------------------------  IN:    Fat Emulsion (Fish Oil &amp; Plant Based) 20% Infusion: 249.7 mL    IV PiggyBack: 100 mL    PRBCs (Packed Red Blood Cells): 340 mL    TPN (Total Parenteral Nutrition): 2302 mL  Total IN: 2991.7 mL    OUT:    Drain (mL): 1275 mL    Drain (mL): 20 mL    Voided (mL): 5600 mL  Total OUT: 6895 mL    Total NET: -3903.3 mL      27 Jan 2024 07:01  -  28 Jan 2024 06:45  --------------------------------------------------------  IN:    Fat Emulsion (Fish Oil &amp; Plant Based) 20% Infusion: 249.6 mL    Oral Fluid: 100 mL    TPN (Total Parenteral Nutrition): 1683 mL  Total IN: 2032.6 mL    OUT:    Drain (mL): 50 mL    Drain (mL): 1015 mL    Voided (mL): 2275 mL  Total OUT: 3340 mL    Total NET: -1307.4 mL        General: NAD, resting comfortably in bed  C/V: NSR  Pulm: Nonlabored breathing, no respiratory distress  Abd: soft, NT/ND, no rebound or guarding, wound manager in place with no bleeding, perc cony capped  Extrem: WWP, no edema, SCDs in place      LABS:                        8.9    9.35  )-----------( 150      ( 27 Jan 2024 07:21 )             29.0     01-28    137  |  104  |  47<H>  ----------------------------<  127<H>  4.2   |  26  |  1.35<H>    Ca    8.2<L>      28 Jan 2024 04:54  Phos  3.9     01-28  Mg     2.0     01-28    TPro  7.8  /  Alb  2.4<L>  /  TBili  4.5<H>  /  DBili  3.0<H>  /  AST  88<H>  /  ALT  58<H>  /  AlkPhos  114  01-28      Urinalysis Basic - ( 28 Jan 2024 04:54 )    Color: x / Appearance: x / SG: x / pH: x  Gluc: 127 mg/dL / Ketone: x  / Bili: x / Urobili: x   Blood: x / Protein: x / Nitrite: x   Leuk Esterase: x / RBC: x / WBC x   Sq Epi: x / Non Sq Epi: x / Bacteria: x        RADIOLOGY & ADDITIONAL STUDIES:   Pt to be admitted to ICU for new onset seizure and hypoglycemia. Consideration of Admission/Observation

## 2024-01-29 NOTE — PROGRESS NOTE ADULT - ASSESSMENT
78M w PMH Crohn's, AFib/Flutter s/p DCCVs on amiodarone, remote ileocectomy and open appendectomy. Admitted (6/23) for SBO vs Crohns flare, s/p NGT decompression and s/p lap converted to open TRE, SBR x 3, left in discontinuity with abthera vac on (6/27), RTOR for ileocolic resection, small bowel anastomosis, and abdominal wall closure on (6/28), c/b fluid collection s/p IR aspiration of perihepatic fluid on (7/3), c/b wound dehiscence s/p RTOR exlap, washout, ileocolic resection with end ileostomy, blow hole colostomy, red rubber from ileostomy to small bowel anastomosis; vicryl bridging mesh on (7/5) transferred to SICU postoperatively for hemodynamic monitoring, with hospital course complicated by periods of severe ELVI and hyponatremia, which resolved but stepped back up for to SICU on (9/10) for acute AMS, intermittent hypoglycemia, AFib with RVR. Percutaneous Cholecystostomy placed on (9/11) for enlarged GB, PCT capped 10/5 and uncapped 10/25 for hyperbilirubinemia, Right brachial DVT, left basillic and cephalic superficial thrombus on duplex 11/2, CT 11/14 with ALDEN ground glass opacity of unclear etiology, completed empiric 7day cefepime course 11/22, and another course of 7day cefepime for PNA  (1/15-23). rising T-bili of unclear etiology, resolved candida glabrata fungemia, ELVI improving.     NEURO: Hx of depression: cont Effexor (halved dose in setting of renal dysfunx). Anxiety: Lorazepam PRN. Holistic consult for anxiety and insomnia. Gabapentin for restless leg syndrome.   HEENT: Timolol eye gtt added on 1/10 for additional systemic BB support in setting of malabsorption  CV: been getting lasix few days ago for volume overload. edema much improved. holding off on additional diuresis. Hx of Afib and recent Aflutter alternating with sinus tachycardia (HR 100s): Cont Lopressor to 10 q6h, added timolol eye ggt for attempted systemic affect. Started therapeutic Lovenox on 1/10, but held since 1/15 in setting of dark red output from ileostomy. s/p previous DCCV, off Amiodarone and Lipitor due to persistent transaminitis. BLE duplex (1/5) no DVT, TTE  (7/18) - PASP 64mmHg, EF 65-70%. holding Losartan & norvasc because not absorbing PO meds, TTE (1/4) LVEF 75%, mild/mod AR, PASP 40, RVEF wnl.  PULM: Atelectasis/dyspnea improved clinically: cont 1 hr of BiPAP every 12hrs and nasal canula or room air. PRN duonebs for wheezing. Encourage OOB and IS. CT from 11/14 with ALDEN ground glass opacity of unclear etiology. COVID negative. RVP negative. Completed 7d empiric cefepime 11/22 to cover for potential PNA and another course of cefepime (1/15-1/23).    GI/FEN: Low res, low fat, high protein diet. Cont Ensure max x1/day however pt likely not absorbing adequately due to proximal fistula. Folate, thiamine. PICC replaced 12/4, switched out PICC on 1/4. 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 Transaminitis slowly improved, elevated T bili, s/p Perc Deb on 9/11, uncapped on 1/15, recap on (1/17). been seen by Hepatology - MRCP 10/30 no obstruction, cont ursodiol, RUQ US 11/25 CBD 5mm, hepatic vessels patent. Cont Vit D weekly.   : Voids. ELVI improved: stopped famotidine given renal dysfunction. MARLO Duplex and RP US unremarkable, CK wnl.    ENDO: Hx hypothyroid: IV Synthroid,  Repeat thyroid studies WNL 1/3/24.   ID: currently not on any abx. completed cefepime for PNA (1/15-23) BCx 11/22 grew candida glabrata, likely CLABSI. s/p Caspo & completed Fluconazole course (12/1-12/9). Ophthalmology evaluated - normal ocular exam. Echo no vegetation. PICC replaced on 12/4. Cont Nystatin powder. // DC: caspofungin (11/24-12/1). empiric 7days cefepime (11/15-11/22), C. tertium, Lactobacillus from IR cx 7/3, and candida albicans, lactobacillus, vanc sensitive E faecium, vanc resistant E gallinarum, vanc resistant E casseliflavus, lactobacillus from OR cx 7/5. Completed course of abx with Imipenem (9/10-9/15). Imipenem (8/26--) imipenem (6/30-7/12, 7/23-7/30), Dapto (6/30-7/5 and 7/23-7/24). Empiric Dapto (8/23-24) and Cefepime (8/23-24).   PPX: SCDs, held therapeutic Lovenox for Afib in setting of dark blood from ileostomy, currently on PPX lovenox given hx of thrombosis.   HEME: Anemia - continue Epogen weekly. S/p Iron Sucrose 200 qd x 3 days for chronic anemia. recurrent drop in Hgb requiring intermittent unit PRBC.  repeat Hgb 8.9, macrocytic anemia,  LINES: PIVs, RUE PICC placed (1/5--) will plan to switch PICC once a month in setting of fungemia history, next switch will be around 2/5 // DC: LUE PICC (9/1-11/1 ), RUE PICC: (11/1-11/24), LUE PICC (12/4-1/5)  WOUNDS/DRAINS: Abdominal wound and fistula with wound manager in place dressing change daily. Had recurrent punctate bleeding at wound bed, s/p placed surgicel, attempt to stitch, electrocautery and epi soaked gauze. Cont epinephrine soaked gauze at bedside as needed for wound bleeding. RLQ Ostomy. IR perc deb tube recapped 1/17 // DC: L Clay drain.  PT: Ambulate as tolerated OOB to chair daily.  DISPO: SICU due to complicated hospital course. but will downgrade labs to every other day   78M w PMH Crohn's, AFib/Flutter s/p DCCVs on amiodarone, remote ileocectomy and open appendectomy. Admitted (6/23) for SBO vs Crohns flare, s/p NGT decompression and s/p lap converted to open TRE, SBR x 3, left in discontinuity with abthera vac on (6/27), RTOR for ileocolic resection, small bowel anastomosis, and abdominal wall closure on (6/28), c/b fluid collection s/p IR aspiration of perihepatic fluid on (7/3), c/b wound dehiscence s/p RTOR exlap, washout, ileocolic resection with end ileostomy, blow hole colostomy, red rubber from ileostomy to small bowel anastomosis; vicryl bridging mesh on (7/5) transferred to SICU postoperatively for hemodynamic monitoring, with hospital course complicated by periods of severe ELVI and hyponatremia, which resolved but stepped back up for to SICU on (9/10) for acute AMS, intermittent hypoglycemia, AFib with RVR. Percutaneous Cholecystostomy placed on (9/11) for enlarged GB, PCT capped 10/5 and uncapped 10/25 for hyperbilirubinemia, Right brachial DVT, left basillic and cephalic superficial thrombus on duplex 11/2, CT 11/14 with ALDEN ground glass opacity of unclear etiology, completed empiric 7day cefepime course 11/22, and another course of 7day cefepime for PNA  (1/15-23). rising T-bili of unclear etiology, resolved candida glabrata fungemia, ELVI improving.     NEURO: Hx of depression: cont Effexor (halved dose in setting of renal dysfunx). Anxiety: Lorazepam PRN. Holistic consult for anxiety and insomnia. Gabapentin for restless leg syndrome.   HEENT: Timolol eye gtt added on 1/10 for additional systemic BB support in setting of malabsorption  CV: been getting lasix few days ago for volume overload. edema much improved. holding off on additional diuresis. Hx of Afib and recent Aflutter alternating with sinus tachycardia (HR 100s): Cont Lopressor to 10 q6h, added timolol eye ggt for attempted systemic affect. Started therapeutic Lovenox on 1/10, but held since 1/15 in setting of dark red output from ileostomy. s/p previous DCCV, off Amiodarone and Lipitor due to persistent transaminitis. BLE duplex (1/5) no DVT, TTE  (7/18) - PASP 64mmHg, EF 65-70%. holding Losartan & norvasc because not absorbing PO meds, TTE (1/4) LVEF 75%, mild/mod AR, PASP 40, RVEF wnl.  PULM: Atelectasis/dyspnea improved clinically: cont 1 hr of BiPAP every 12hrs and nasal canula or room air. PRN duonebs for wheezing. Encourage OOB and IS. CT from 11/14 with ALDEN ground glass opacity of unclear etiology. COVID negative. RVP negative. Completed 7d empiric cefepime 11/22 to cover for potential PNA and another course of cefepime (1/15-1/23).    GI/FEN: Low res, low fat, high protein diet. Cont Ensure max x1/day however pt likely not absorbing adequately due to proximal fistula. Folate, thiamine. PICC replaced 12/4, switched out PICC on 1/4. 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 Transaminitis slowly improved, elevated T bili, s/p Perc Deb on 9/11, uncapped on 1/15, recap on (1/17). been seen by Hepatology - MRCP 10/30 no obstruction, cont ursodiol, RUQ US 11/25 CBD 5mm, hepatic vessels patent. Cont Vit D weekly.   : Voids. ELVI improved: stopped famotidine given renal dysfunction. MARLO Duplex and RP US unremarkable, CK wnl.    ENDO: Hx hypothyroid: IV Synthroid,  Repeat thyroid studies WNL 1/3/24.   ID: currently not on any abx. completed cefepime for PNA (1/15-23) BCx 11/22 grew candida glabrata, likely CLABSI. s/p Caspo & completed Fluconazole course (12/1-12/9). Ophthalmology evaluated - normal ocular exam. Echo no vegetation. PICC replaced on 12/4. Cont Nystatin powder. // DC: caspofungin (11/24-12/1). empiric 7days cefepime (11/15-11/22), C. tertium, Lactobacillus from IR cx 7/3, and candida albicans, lactobacillus, vanc sensitive E faecium, vanc resistant E gallinarum, vanc resistant E casseliflavus, lactobacillus from OR cx 7/5. Completed course of abx with Imipenem (9/10-9/15). Imipenem (8/26--) imipenem (6/30-7/12, 7/23-7/30), Dapto (6/30-7/5 and 7/23-7/24). Empiric Dapto (8/23-24) and Cefepime (8/23-24).   PPX: SCDs, held therapeutic Lovenox for Afib in setting of dark blood from ileostomy, currently on PPX lovenox given hx of thrombosis.   HEME: Anemia - continue Epogen weekly. S/p Iron Sucrose 200 qd x 3 days for chronic anemia. recurrent drop in Hgb requiring intermittent unit PRBC.  repeat Hgb 8.9, macrocytic anemia,  LINES: PIVs, RUE PICC placed (1/5--) will plan to switch PICC once a month in setting of fungemia history, next switch will be around 2/5 // DC: LUE PICC (9/1-11/1 ), RUE PICC: (11/1-11/24), LUE PICC (12/4-1/5)  WOUNDS/DRAINS: Abdominal wound and fistula with wound manager in place dressing change daily. Had recurrent punctate bleeding at wound bed, s/p placed surgicel, attempt to stitch, electrocautery and epi soaked gauze. Cont epinephrine soaked gauze at bedside as needed for wound bleeding. RLQ Ostomy. IR perc deb tube recapped 1/17 // DC: L Clay drain.  PT: Ambulate as tolerated OOB to chair daily.  DISPO: SICU due to complicated hospital course. but will downgrade labs to every other day, social work to coordinate final dispo to home over the next week

## 2024-01-29 NOTE — PROGRESS NOTE ADULT - NS ATTEND AMEND GEN_ALL_CORE FT
AF, UC s/p SBR, ileocolic resection, ileostomy, enteroatmospheric fistula, cholecystostomy, anemia  physical as above  breathing better after diuresis  hold on more diuresis for now  continue BIPAP BID and at night  continue metoprolol  SQ lovenox  TPN

## 2024-01-29 NOTE — CHART NOTE - NSCHARTNOTEFT_GEN_A_CORE
Admitting Diagnosis:   Patient is a 78y old  Male who presents with a chief complaint of SBO (23 Jan 2024 08:02)      PAST MEDICAL & SURGICAL HISTORY:  Essential hypertension  Hypertension      Atrial fibrillation  s/p cardioversion 2010 and 2014  Pt. reports 4 DCCV  Now on Amiodarone 200mg PO bid and Eliquis 5mg PO bid  Last DCCV 4 yrs ago at [facility]      Crohn's disease  s/p partial resection of ileum      Hyperlipidemia      Hypothyroidism      History of depression  On Venlafaxine ER 150mg PO bid      Junctional rhythm      H/O knee surgery      History of cataract surgery          Current Nutrition Order: Diet, Regular:   Liquid Protein Supplement     Qty per Day:  2  Supplement Feeding Modality:  Oral  Ensure Max Cans or Servings Per Day:  1       Frequency:  Daily (12-15-23 @ 10:43) [Active]      Parenteral Nutrition - Adult 1 Each TPN Continuous <Continuous>, 01-29-24 @ 17:00, 17:00, Stop order after: 1 Days  Parenteral Nutrition - Adult 1 Each TPN Continuous <Continuous>, 01-28-24 @ 17:00, 17:00, Stop order after: 1 Days      PO Intake: Good (%) [X]  Fair (50-75%) [   ] Poor (<25%) [   ]    GI Issues: No issues with nausea, vomiting, reported at time of visit. Pt has an ostomy bag, producing output. Pt is not currently on a bowel regimen.     Pain: no indication of pain at time of visit.     Skin Integrity: No Pressure Injuries per flow sheets. Surgical incision: abdomen 7/5; Rudy Score: 17     Labs:   01-29    136  |  104  |  46<H>  ----------------------------<  136<H>  4.5   |  22  |  1.24    Ca    8.2<L>      29 Jan 2024 04:47  Phos  3.7     01-29  Mg     2.1     01-29    TPro  7.7  /  Alb  2.1<L>  /  TBili  4.6<H>  /  DBili  3.0<H>  /  AST  87<H>  /  ALT  56<H>  /  AlkPhos  114  01-29    CAPILLARY BLOOD GLUCOSE          Medications:  MEDICATIONS  (STANDING):  chlorhexidine 2% Cloths 1 Application(s) Topical <User Schedule>  cholestyramine Powder (Sugar-Free) 4 Gram(s) Oral daily  enoxaparin Injectable 40 milliGRAM(s) SubCutaneous every 24 hours  epoetin matt-epbx (RETACRIT) Injectable 14585 Unit(s) SubCutaneous every 7 days  ergocalciferol 84900 Unit(s) Oral <User Schedule>  gabapentin 100 milliGRAM(s) Oral at bedtime  glucagon  Injectable 1 milliGRAM(s) IntraMuscular once  levothyroxine Injectable 30 MICROGram(s) IV Push at bedtime  lipid, fat emulsion (Fish Oil and Plant Based) 20% Infusion 0.5319 Gm/kG/Day (20.83 mL/Hr) IV Continuous <Continuous>  melatonin 5 milliGRAM(s) Oral at bedtime  metoprolol tartrate Injectable 10 milliGRAM(s) IV Push every 6 hours  Parenteral Nutrition - Adult 1 Each TPN Continuous <Continuous>  Parenteral Nutrition - Adult 1 Each TPN Continuous <Continuous>  teduglutide Injectable 4.7 milliGRAM(s) SubCutaneous every 24 hours  timolol 0.5% Solution 1 Drop(s) Both EYES daily  ursodiol Suspension 300 milliGRAM(s) Oral every 8 hours  venlafaxine XR. 150 milliGRAM(s) Oral daily    MEDICATIONS  (PRN):  chlorhexidine 0.12% Liquid 15 milliLiter(s) Swish and Spit two times a day PRN oral hygeine  EPINEPHrine   1 mG/mL (1:1,000) Topical Solution 1 Application(s) Topical every 24 hours PRN for wound bleeding  LORazepam   Injectable 1 milliGRAM(s) IV Push <User Schedule> PRN Anxiety  ondansetron Injectable 4 milliGRAM(s) IV Push every 6 hours PRN Nausea and/or Vomiting      Weight:  1/17 218.4 pounds   1/12 207.1 pounds (dosing)  1/7 207.2 pounds   12/19 205.6 pounds     Weights seem to be upward trending. RD to continue to monitor weight trends.     Estimated energy needs:   Calories: 8226-6176 kcals based on 25-35 kcals/kg  Protein: 129.6-172.8 g protein based on 1.5-2.0g protein/kg + additional depending on outputs  *Fluid needs per team    Ideal body weight (86.4kg) used for calculations as pt >100% IBW and BMI <30 per [facility] Standards of Care. Needs estimated for age and adjusted for current clinical status, increased needs for post-op & open abd wound healing    IBW: 86.2kg  % IBW: 115.0%    Subjective:   "78M w PMH Crohn's, AFib/Flutter s/p DCCVs on amiodarone, remote ileocectomy and open appendectomy. Admitted (6/23) for SBO vs Crohns flare, s/p NGT decompression and s/p lap converted to open TRE, SBR x 3, left in discontinuity with abthera vac on (6/27), RTOR for ileocolic resection, small bowel anastomosis, and abdominal wall closure on (6/28), c/b fluid collection s/p IR aspiration of perihepatic fluid on (7/3), c/b wound dehiscence s/p RTOR exlap, washout, ileocolic resection with end ileostomy, blow hole colostomy, red rubber from ileostomy to small bowel anastomosis; vicryl bridging mesh on (7/5) transferred to SICU postoperatively for hemodynamic monitoring, with hospital course complicated by periods of severe ELVI and hyponatremia, which resolved but stepped back up for to SICU on (9/10) for acute AMS, intermittent hypoglycemia, AFib with RVR. Percutaneous Cholecystostomy placed on (9/11) for enlarged GB, PCT capped 10/5 and uncapped 10/25 for hyperbilirubinemia, Right brachial DVT, left basillic and cephalic superficial thrombus on duplex 11/2, CT 11/14 with ALDEN ground glass opacity of unclear etiology, completed empiric 7day cefepime course 11/22, and another course of 7day cefepime for PNA  (1/15-23). rising T-bili of unclear etiology, resolved candida glabrata fungemia, ELVI improving."    Visited pt at bedside on 5EA. Pt is currently on PO diet and on TPN. RD observed pt consuming >75% of meals today. States that his appetite has been good and has been consuming ~75% of meals consistently. Ordered for VItamin D, IV synthroid, epoetin, cholestyramine. Nutritionally Pertinent Labs 1/29 BUN: 46(H), Glucose: 136 (H), Ca: 8.2 (L). RDN will continue to monitor, reassess, and intervene as appropriate.     Previous Nutrition Diagnosis:  1. Increased Nutrient Needs R/T physiological demands for nutrient AEB post-op wound healing  Active [ X ]  Resolved [   ]    2. Altered GI function R/T extensive GI history, insufficient bowel, high output fistula AEB supplemental PN requirement to meet nutrition needs  Active [ X  ]  Resolved [   ]    Goal: Pt will meet at least 75% of protein & energy needs via most appropriate route for nutrition     Recommendations:  1.  c/w TPN via PICC as primary means to nutrition - Recommend 380g Dex, 120g AA, 50g SMOF lipids daily; provides 2272 kcals, 120g protein daily, GIR 4.56 mg/kg/min   - provides 26.3 kcals/kg, 20.7 non-protein kcals/kg, 1.4g protein/kg IBW  - monitor weights & wound healing, adjust substrates as indicated  - fluid/volume & lytes per team  - MVI, thiamine, vit C in bag, selenium  - c/w zinc 3x per week, copper & selenium in bag [levels rechecked 1/23, pending results]  2. PO diet per team discretion  -  c/w Regular diet as medically feasible to allow for more menu options  - c/w 1 LPS BID [200 kcals, 30g protein] + 1 Ensure Max daily [150 kcals, 30g protein] as amenable  - consider NPO for bowel rest as indicated   - ongoing education prn  3. Close monitoring of weights, outputs, labs, adjust TPN as indicated to prevent over/underfeeding  4. Recommend weekly weights for trending/ensuring adequacy of nutrition provision  5. Hydration per team  - risk for dehydration with high outputs, monitor  - additional fluids per team  - consider oral rehydration solution  6. Monitor BMP/Mg/Phos, POC BG while on TPN  - monitor & replenish lytes outside TPN bag prn  7. Continue close monitoring of clinical course & adjust recommendations prn  8 RD remains available upon request and will follow-up per protocol.     Education: ongoing diet education prn    Risk Level: High [X] Moderate [   ] Low [   ]

## 2024-01-29 NOTE — PROGRESS NOTE ADULT - ASSESSMENT
78 year old male with a PMHx of Crohn's, AFib/Flutter s/p DCCVs on amiodarone, remote ileocectomy and open appendectomy. Admitted (6/23) for SBO vs Crohns flare, s/p NGT decompression and s/p lap converted to open TRE, SBR x 3, left in discontinuity with abthera vac on (6/27), RTOR for ileocolic resection, small bowel anastomosis, and abdominal wall closure on (6/28), c/b fluid collection s/p IR aspiration of perihepatic fluid on (7/3), c/b wound dehiscence s/p RTOR exlap, washout, ileocolic resection with end ileostomy, blow hole colostomy, red rubber from ileostomy to small bowel anastomosis; vicryl bridging mesh on (7/5) transferred to SICU postoperatively for hemodynamic monitoring, with hospital course complicated by periods of severe LEVI and hyponatremia, which resolved but stepped back up for to SICU on (9/10) for acute AMS, intermittent hypoglycemia, AFib with RVR. Percutaneous Cholecystostomy placed on (9/11) for enlarged GB, PCT capped 10/5 and uncapped 10/25 for hyperbilirubinemia, Right brachial DVT, left basillic and cephalic superficial thrombus on duplex 11/2, CT 11/14 with ALDEN ground glass opacity of unclear etiology, completed empiric 7day cefepime course 11/22, rising T-bili of unclear etiology now w resolved candida glabrata fungemia, ELVI improving. Gattex started on 1/24    - Continue TPN  - Continue Gattex   - Team 5 will follow  - Plan discussed with Dr Peterson

## 2024-01-29 NOTE — PROGRESS NOTE ADULT - SUBJECTIVE AND OBJECTIVE BOX
SUBJECTIVE: Pt seen and examined at bedside this am by surgery team. Tolerating diet, pain well controlled. Denies further episodes of bleeding on wound manager.     MEDICATIONS  (STANDING):  chlorhexidine 2% Cloths 1 Application(s) Topical <User Schedule>  cholestyramine Powder (Sugar-Free) 4 Gram(s) Oral daily  enoxaparin Injectable 40 milliGRAM(s) SubCutaneous every 24 hours  epoetin matt-epbx (RETACRIT) Injectable 17544 Unit(s) SubCutaneous every 7 days  ergocalciferol 72146 Unit(s) Oral <User Schedule>  gabapentin 100 milliGRAM(s) Oral at bedtime  glucagon  Injectable 1 milliGRAM(s) IntraMuscular once  levothyroxine Injectable 30 MICROGram(s) IV Push at bedtime  lipid, fat emulsion (Fish Oil and Plant Based) 20% Infusion 0.5319 Gm/kG/Day (20.83 mL/Hr) IV Continuous <Continuous>  lipid, fat emulsion (Fish Oil and Plant Based) 20% Infusion 0.5319 Gm/kG/Day (20.83 mL/Hr) IV Continuous <Continuous>  melatonin 5 milliGRAM(s) Oral at bedtime  metoprolol tartrate Injectable 10 milliGRAM(s) IV Push every 6 hours  Parenteral Nutrition - Adult 1 Each TPN Continuous <Continuous>  Parenteral Nutrition - Adult 1 Each TPN Continuous <Continuous>  teduglutide Injectable 4.7 milliGRAM(s) SubCutaneous every 24 hours  timolol 0.5% Solution 1 Drop(s) Both EYES daily  ursodiol Suspension 300 milliGRAM(s) Oral every 8 hours  venlafaxine XR. 150 milliGRAM(s) Oral daily    MEDICATIONS  (PRN):  chlorhexidine 0.12% Liquid 15 milliLiter(s) Swish and Spit two times a day PRN oral hygeine  EPINEPHrine   1 mG/mL (1:1,000) Topical Solution 1 Application(s) Topical every 24 hours PRN for wound bleeding  LORazepam   Injectable 1 milliGRAM(s) IV Push <User Schedule> PRN Anxiety  ondansetron Injectable 4 milliGRAM(s) IV Push every 6 hours PRN Nausea and/or Vomiting      Vital Signs Last 24 Hrs  T(C): 36.2 (29 Jan 2024 05:10), Max: 36.8 (28 Jan 2024 17:00)  T(F): 97.2 (29 Jan 2024 05:10), Max: 98.3 (29 Jan 2024 01:10)  HR: 93 (29 Jan 2024 06:00) (90 - 93)  BP: 146/67 (29 Jan 2024 06:00) (106/55 - 180/88)  BP(mean): 96 (29 Jan 2024 06:00) (75 - 116)  RR: 21 (29 Jan 2024 06:00) (15 - 33)  SpO2: 98% (29 Jan 2024 06:00) (95% - 100%)    Parameters below as of 29 Jan 2024 07:00  Patient On (Oxygen Delivery Method): room air      Physical Exam:  General: Patient is doing well and lying in bed comfortably  Constitutional: alert and awake, A&O x 4  Pulm: no respiratory distress  CV: Regular rate and rhythm, in and out of afib/flutter  Abd: soft, nontender, nondistended. No rebound, no guarding. Wound manager in place, no bleeding, no leaks. Ileostomy and wound manager with soft semi-formed output, brown in color; perc cony capped.  Extremities: warm, well perfused        I&O's Detail    27 Jan 2024 07:01  -  28 Jan 2024 07:00  --------------------------------------------------------  IN:    Fat Emulsion (Fish Oil &amp; Plant Based) 20% Infusion: 249.6 mL    Oral Fluid: 100 mL    TPN (Total Parenteral Nutrition): 1989 mL  Total IN: 2338.6 mL    OUT:    Drain (mL): 50 mL    Drain (mL): 1040 mL    Voided (mL): 2275 mL  Total OUT: 3365 mL    Total NET: -1026.4 mL      28 Jan 2024 07:01  -  29 Jan 2024 06:39  --------------------------------------------------------  IN:    Fat Emulsion (Fish Oil &amp; Plant Based) 20% Infusion: 249.6 mL    TPN (Total Parenteral Nutrition): 2222 mL  Total IN: 2471.6 mL    OUT:    Drain (mL): 1200 mL    Voided (mL): 2075 mL  Total OUT: 3275 mL    Total NET: -803.4 mL          LABS:                        8.4    8.68  )-----------( 141      ( 29 Jan 2024 04:47 )             26.4     01-29    136  |  104  |  46<H>  ----------------------------<  136<H>  4.5   |  22  |  1.24    Ca    8.2<L>      29 Jan 2024 04:47  Phos  3.7     01-29  Mg     2.1     01-29    TPro  7.7  /  Alb  2.1<L>  /  TBili  4.6<H>  /  DBili  3.0<H>  /  AST  87<H>  /  ALT  56<H>  /  AlkPhos  114  01-29      Urinalysis Basic - ( 29 Jan 2024 04:47 )    Color: x / Appearance: x / SG: x / pH: x  Gluc: 136 mg/dL / Ketone: x  / Bili: x / Urobili: x   Blood: x / Protein: x / Nitrite: x   Leuk Esterase: x / RBC: x / WBC x   Sq Epi: x / Non Sq Epi: x / Bacteria: x

## 2024-01-29 NOTE — PROGRESS NOTE ADULT - SUBJECTIVE AND OBJECTIVE BOX
S: pt says he feels well. breathing ok, no sob.   No new issues/events overnight, no new med c/o    O: ICU Vital Signs Last 24 Hrs  T(F): 97.2 (01-29-24 @ 05:10), Max: 98.3 (01-29-24 @ 01:10)  HR: 93 (01-29-24 @ 08:00) (90 - 93)  BP: 143/65 (01-29-24 @ 08:00) (106/55 - 180/88)  BP(mean): 93 (01-29-24 @ 08:00) (75 - 116)  ABP: --  RR: 22 (01-29-24 @ 08:00) (15 - 33)  SpO2: 97% (01-29-24 @ 08:00) (95% - 100%)    PHYSICAL EXAM:   Neurological: AAOx3, CNII-XII intact,  strength 5/5 b/l  ENT: mucus membrane moist  Cardiovascular: RRR  Respiratory: CTA  Gastrointestinal: soft, NT, ND, BS+, no bleeding from ileostomy,  fistula darkish red content, no active bleeding.   Extremities: warm, dependent edema improved compared with yesterday exam.   Vascular: no cyanosis/erythema  Skin: no rashes  MSK: no joint swelling.       LABS:    01-29    136  |  104  |  46<H>  ----------------------------<  136<H>  4.5   |  22  |  1.24    Ca    8.2<L>      29 Jan 2024 04:47  Phos  3.7     01-29  Mg     2.1     01-29    TPro  7.7  /  Alb  2.1<L>  /  TBili  4.6<H>  /  DBili  3.0<H>  /  AST  87<H>  /  ALT  56<H>  /  AlkPhos  114  01-29  LIVER FUNCTIONS - ( 29 Jan 2024 04:47 )  Alb: 2.1 g/dL / Pro: 7.7 g/dL / ALK PHOS: 114 U/L / ALT: 56 U/L / AST: 87 U/L / GGT: x                               8.4    8.68  )-----------( 141      ( 29 Jan 2024 04:47 )             26.4     Urinalysis Basic - ( 29 Jan 2024 04:47 )    Color: x / Appearance: x / SG: x / pH: x  Gluc: 136 mg/dL / Ketone: x  / Bili: x / Urobili: x   Blood: x / Protein: x / Nitrite: x   Leuk Esterase: x / RBC: x / WBC x   Sq Epi: x / Non Sq Epi: x / Bacteria: x    CAPILLARY BLOOD GLUCOSE        MEDICATIONS  (STANDING):  chlorhexidine 2% Cloths 1 Application(s) Topical <User Schedule>  cholestyramine Powder (Sugar-Free) 4 Gram(s) Oral daily  enoxaparin Injectable 40 milliGRAM(s) SubCutaneous every 24 hours  epoetin matt-epbx (RETACRIT) Injectable 53761 Unit(s) SubCutaneous every 7 days  ergocalciferol 58950 Unit(s) Oral <User Schedule>  gabapentin 100 milliGRAM(s) Oral at bedtime  glucagon  Injectable 1 milliGRAM(s) IntraMuscular once  levothyroxine Injectable 30 MICROGram(s) IV Push at bedtime  lipid, fat emulsion (Fish Oil and Plant Based) 20% Infusion 0.5319 Gm/kG/Day (20.83 mL/Hr) IV Continuous <Continuous>  lipid, fat emulsion (Fish Oil and Plant Based) 20% Infusion 0.5319 Gm/kG/Day (20.83 mL/Hr) IV Continuous <Continuous>  melatonin 5 milliGRAM(s) Oral at bedtime  metoprolol tartrate Injectable 10 milliGRAM(s) IV Push every 6 hours  Parenteral Nutrition - Adult 1 Each TPN Continuous <Continuous>  Parenteral Nutrition - Adult 1 Each TPN Continuous <Continuous>  teduglutide Injectable 4.7 milliGRAM(s) SubCutaneous every 24 hours  timolol 0.5% Solution 1 Drop(s) Both EYES daily  ursodiol Suspension 300 milliGRAM(s) Oral every 8 hours  venlafaxine XR. 150 milliGRAM(s) Oral daily    MEDICATIONS  (PRN):  chlorhexidine 0.12% Liquid 15 milliLiter(s) Swish and Spit two times a day PRN oral hygeine  EPINEPHrine   1 mG/mL (1:1,000) Topical Solution 1 Application(s) Topical every 24 hours PRN for wound bleeding  LORazepam   Injectable 1 milliGRAM(s) IV Push <User Schedule> PRN Anxiety  ondansetron Injectable 4 milliGRAM(s) IV Push every 6 hours PRN Nausea and/or Vomiting      Poole:	  [x ] None	[ ] Daily Poole Order Placed	   Indication:	  [ ] Strict I and O's    [ ] Obstruction     [ ] Incontinence + Stage 3 or 4 Decubitus  Central Line:  [ ] None	   [ x]  Medication / TPN Administration     [ ] No Peripheral IV        S: pt says he feels well. breathing ok, no sob.   No new issues/events overnight, no new med c/o  No new complaints endorsed    O: ICU Vital Signs Last 24 Hrs  T(F): 97.2 (01-29-24 @ 05:10), Max: 98.3 (01-29-24 @ 01:10)  HR: 93 (01-29-24 @ 08:00) (90 - 93)  BP: 143/65 (01-29-24 @ 08:00) (106/55 - 180/88)  BP(mean): 93 (01-29-24 @ 08:00) (75 - 116)  ABP: --  RR: 22 (01-29-24 @ 08:00) (15 - 33)  SpO2: 97% (01-29-24 @ 08:00) (95% - 100%)    PHYSICAL EXAM:   Neurological: AAOx3, CNII-XII intact,  strength 5/5 b/l  ENT: mucus membrane moist  Cardiovascular: RRR  Respiratory: CTA  Gastrointestinal: soft, NT, ND, BS+, no bleeding from ileostomy,  fistula darkish red content, no active bleeding.   Extremities: warm, dependent edema improved compared with yesterday exam.   Vascular: no cyanosis/erythema  Skin: no rashes  MSK: no joint swelling.     LABS:    01-29    136  |  104  |  46<H>  ----------------------------<  136<H>  4.5   |  22  |  1.24    Ca    8.2<L>      29 Jan 2024 04:47  Phos  3.7     01-29  Mg     2.1     01-29    TPro  7.7  /  Alb  2.1<L>  /  TBili  4.6<H>  /  DBili  3.0<H>  /  AST  87<H>  /  ALT  56<H>  /  AlkPhos  114  01-29  LIVER FUNCTIONS - ( 29 Jan 2024 04:47 )  Alb: 2.1 g/dL / Pro: 7.7 g/dL / ALK PHOS: 114 U/L / ALT: 56 U/L / AST: 87 U/L / GGT: x                               8.4    8.68  )-----------( 141      ( 29 Jan 2024 04:47 )             26.4     Urinalysis Basic - ( 29 Jan 2024 04:47 )    Color: x / Appearance: x / SG: x / pH: x  Gluc: 136 mg/dL / Ketone: x  / Bili: x / Urobili: x   Blood: x / Protein: x / Nitrite: x   Leuk Esterase: x / RBC: x / WBC x   Sq Epi: x / Non Sq Epi: x / Bacteria: x    CAPILLARY BLOOD GLUCOSE        MEDICATIONS  (STANDING):  chlorhexidine 2% Cloths 1 Application(s) Topical <User Schedule>  cholestyramine Powder (Sugar-Free) 4 Gram(s) Oral daily  enoxaparin Injectable 40 milliGRAM(s) SubCutaneous every 24 hours  epoetin matt-epbx (RETACRIT) Injectable 65050 Unit(s) SubCutaneous every 7 days  ergocalciferol 58418 Unit(s) Oral <User Schedule>  gabapentin 100 milliGRAM(s) Oral at bedtime  glucagon  Injectable 1 milliGRAM(s) IntraMuscular once  levothyroxine Injectable 30 MICROGram(s) IV Push at bedtime  lipid, fat emulsion (Fish Oil and Plant Based) 20% Infusion 0.5319 Gm/kG/Day (20.83 mL/Hr) IV Continuous <Continuous>  lipid, fat emulsion (Fish Oil and Plant Based) 20% Infusion 0.5319 Gm/kG/Day (20.83 mL/Hr) IV Continuous <Continuous>  melatonin 5 milliGRAM(s) Oral at bedtime  metoprolol tartrate Injectable 10 milliGRAM(s) IV Push every 6 hours  Parenteral Nutrition - Adult 1 Each TPN Continuous <Continuous>  Parenteral Nutrition - Adult 1 Each TPN Continuous <Continuous>  teduglutide Injectable 4.7 milliGRAM(s) SubCutaneous every 24 hours  timolol 0.5% Solution 1 Drop(s) Both EYES daily  ursodiol Suspension 300 milliGRAM(s) Oral every 8 hours  venlafaxine XR. 150 milliGRAM(s) Oral daily    MEDICATIONS  (PRN):  chlorhexidine 0.12% Liquid 15 milliLiter(s) Swish and Spit two times a day PRN oral hygeine  EPINEPHrine   1 mG/mL (1:1,000) Topical Solution 1 Application(s) Topical every 24 hours PRN for wound bleeding  LORazepam   Injectable 1 milliGRAM(s) IV Push <User Schedule> PRN Anxiety  ondansetron Injectable 4 milliGRAM(s) IV Push every 6 hours PRN Nausea and/or Vomiting      Poole:	  [x ] None	[ ] Daily Poole Order Placed	   Indication:	  [ ] Strict I and O's    [ ] Obstruction     [ ] Incontinence + Stage 3 or 4 Decubitus  Central Line:  [ ] None	   [ x]  Medication / TPN Administration     [ ] No Peripheral IV

## 2024-01-30 NOTE — PROGRESS NOTE ADULT - ASSESSMENT
78M w PMH Crohn's, AFib/Flutter s/p DCCVs on amiodarone, remote ileocectomy and open appendectomy. Admitted (6/23) for SBO vs Crohns flare, s/p NGT decompression and s/p lap converted to open TRE, SBR x 3, left in discontinuity with abthera vac on (6/27), RTOR for ileocolic resection, small bowel anastomosis, and abdominal wall closure on (6/28), c/b fluid collection s/p IR aspiration of perihepatic fluid on (7/3), c/b wound dehiscence s/p RTOR exlap, washout, ileocolic resection with end ileostomy, blow hole colostomy, red rubber from ileostomy to small bowel anastomosis; vicryl bridging mesh on (7/5) transferred to SICU postoperatively for hemodynamic monitoring, with hospital course complicated by periods of severe ELVI and hyponatremia, which resolved but stepped back up for to SICU on (9/10) for acute AMS, intermittent hypoglycemia, AFib with RVR. Percutaneous Cholecystostomy placed on (9/11) for enlarged GB, PCT capped 10/5 and uncapped 10/25 for hyperbilirubinemia, Right brachial DVT, left basillic and cephalic superficial thrombus on duplex 11/2, CT 11/14 with ALDEN ground glass opacity of unclear etiology, completed empiric 7day cefepime course 11/22, and another course of 7day cefepime for PNA  (1/15-23). rising T-bili of unclear etiology, resolved candida glabrata fungemia, ELVI improving.     Continue with Gattex  Reg Diet  SQL/SCDs/OOBA/IS  Daily wound manager changes  Continue care per primary/SICU

## 2024-01-30 NOTE — PROGRESS NOTE ADULT - SUBJECTIVE AND OBJECTIVE BOX
SUBJECTIVE: Pt seen and examined at bedside this am by surgery team. Patient is lying comfortably in bed with no complaints. Tolerating diet. Patient denies fever, nausea, vomiting, chest pain, and shortness of breath.    MEDICATIONS  (STANDING):  chlorhexidine 2% Cloths 1 Application(s) Topical <User Schedule>  cholestyramine Powder (Sugar-Free) 4 Gram(s) Oral daily  enoxaparin Injectable 40 milliGRAM(s) SubCutaneous every 24 hours  epoetin matt-epbx (RETACRIT) Injectable 33155 Unit(s) SubCutaneous every 7 days  ergocalciferol 19768 Unit(s) Oral <User Schedule>  gabapentin 100 milliGRAM(s) Oral at bedtime  glucagon  Injectable 1 milliGRAM(s) IntraMuscular once  levothyroxine Injectable 30 MICROGram(s) IV Push at bedtime  lipid, fat emulsion (Fish Oil and Plant Based) 20% Infusion 0.54 Gm/kG/Day (21.2 mL/Hr) IV Continuous <Continuous>  lipid, fat emulsion (Fish Oil and Plant Based) 20% Infusion 0.5319 Gm/kG/Day (20.83 mL/Hr) IV Continuous <Continuous>  melatonin 5 milliGRAM(s) Oral at bedtime  metoprolol tartrate Injectable 10 milliGRAM(s) IV Push every 6 hours  Parenteral Nutrition - Adult 1 Each TPN Continuous <Continuous>  Parenteral Nutrition - Adult 1 Each TPN Continuous <Continuous>  teduglutide Injectable 4.7 milliGRAM(s) SubCutaneous every 24 hours  timolol 0.5% Solution 1 Drop(s) Both EYES daily  ursodiol Suspension 300 milliGRAM(s) Oral every 8 hours  venlafaxine XR. 150 milliGRAM(s) Oral daily    MEDICATIONS  (PRN):  chlorhexidine 0.12% Liquid 15 milliLiter(s) Swish and Spit two times a day PRN oral hygeine  EPINEPHrine   1 mG/mL (1:1,000) Topical Solution 1 Application(s) Topical every 24 hours PRN for wound bleeding  LORazepam   Injectable 1 milliGRAM(s) IV Push <User Schedule> PRN Anxiety  ondansetron Injectable 4 milliGRAM(s) IV Push every 6 hours PRN Nausea and/or Vomiting      Vital Signs Last 24 Hrs  T(C): 36.7 (30 Jan 2024 05:20), Max: 36.8 (30 Jan 2024 01:19)  T(F): 98 (30 Jan 2024 05:20), Max: 98.2 (30 Jan 2024 01:19)  HR: 91 (30 Jan 2024 07:00) (89 - 95)  BP: 164/72 (30 Jan 2024 07:00) (116/56 - 164/72)  BP(mean): 103 (30 Jan 2024 07:00) (80 - 116)  RR: 23 (30 Jan 2024 07:00) (20 - 32)  SpO2: 100% (30 Jan 2024 07:00) (94% - 100%)    Parameters below as of 30 Jan 2024 06:00  Patient On (Oxygen Delivery Method): BiPAP/CPAP    O2 Concentration (%): 40    Physical Exam  General: Patient is doing well and sitting in bed comfortably  Constitutional: alert and oriented   Pulm: no respiratory distress  CV: Regular rate  Abd: soft, nontender, nondistended. No rebound, no guarding. Wound manager in place, no bleeding, no leaks. soft semi-formed output; Perc cony capped; Ileostomy with minimal output  Extremities: warm, well perfused, mild peripheral edema    I&O's Detail    29 Jan 2024 07:01  -  30 Jan 2024 07:00  --------------------------------------------------------  IN:    Fat Emulsion (Fish Oil &amp; Plant Based) 20% Infusion: 270.4 mL    Oral Fluid: 660 mL    TPN (Total Parenteral Nutrition): 2069 mL  Total IN: 2999.4 mL    OUT:    Drain (mL): 600 mL    Voided (mL): 1150 mL  Total OUT: 1750 mL    Total NET: 1249.4 mL        LABS:                        8.4    8.68  )-----------( 141      ( 29 Jan 2024 04:47 )             26.4     01-29    136  |  104  |  46<H>  ----------------------------<  136<H>  4.5   |  22  |  1.24    Ca    8.2<L>      29 Jan 2024 04:47  Phos  3.7     01-29  Mg     2.1     01-29    TPro  7.7  /  Alb  2.1<L>  /  TBili  4.6<H>  /  DBili  3.0<H>  /  AST  87<H>  /  ALT  56<H>  /  AlkPhos  114  01-29      Urinalysis Basic - ( 29 Jan 2024 04:47 )    Color: x / Appearance: x / SG: x / pH: x  Gluc: 136 mg/dL / Ketone: x  / Bili: x / Urobili: x   Blood: x / Protein: x / Nitrite: x   Leuk Esterase: x / RBC: x / WBC x   Sq Epi: x / Non Sq Epi: x / Bacteria: x

## 2024-01-30 NOTE — PROGRESS NOTE ADULT - ASSESSMENT
78 year old male with a PMHx of Crohn's, AFib/Flutter s/p DCCVs on amiodarone, remote ileocectomy and open appendectomy. Admitted (6/23) for SBO vs Crohns flare, s/p NGT decompression and s/p lap converted to open TRE, SBR x 3, left in discontinuity with abthera vac on (6/27), RTOR for ileocolic resection, small bowel anastomosis, and abdominal wall closure on (6/28), c/b fluid collection s/p IR aspiration of perihepatic fluid on (7/3), c/b wound dehiscence s/p RTOR exlap, washout, ileocolic resection with end ileostomy, blow hole colostomy, red rubber from ileostomy to small bowel anastomosis; vicryl bridging mesh on (7/5) transferred to SICU postoperatively for hemodynamic monitoring, with hospital course complicated by periods of severe ELVI and hyponatremia, which resolved but stepped back up for to SICU on (9/10) for acute AMS, intermittent hypoglycemia, AFib with RVR. Percutaneous Cholecystostomy placed on (9/11) for enlarged GB, PCT capped 10/5 and uncapped 10/25 for hyperbilirubinemia, Right brachial DVT, left basillic and cephalic superficial thrombus on duplex 11/2, CT 11/14 with ALDEN ground glass opacity of unclear etiology, completed empiric 7day cefepime course 11/22, rising T-bili of unclear etiology now w resolved candida glabrata fungemia, ELVI improving. Gattex started on 1/24, doing well tolerating regular diet.     - Continue TPN  - Continue Gattex   - Team 5 will follow  - Pending final recommendations after discussion with Dr Peterson

## 2024-01-30 NOTE — ADVANCED PRACTICE NURSE CONSULT - ASSESSMENT
[facility] over fistulized stoma/wound bed at mid abdomen. Effluent from stoma medium brown, slightly thick consistency. Denudement still in center of wound bed, thin layer of Triad ointment applied over this area prior to placing wound manager. Periwound protected with Cavilon barrier wipes and stoma rings, Coloplast 1 piece convex appliance placed at ileostomy site. Effluent from ileostomy also slightly thick, medium brown.

## 2024-01-30 NOTE — PROGRESS NOTE ADULT - SUBJECTIVE AND OBJECTIVE BOX
INTERVAL/OVERNIGHT EVENTS: NAEO.     SUBJECTIVE: No complaints. Pain controlled. No SOB/CP. No N/V. Voids without issues.     Neurologic Medications  gabapentin 100 milliGRAM(s) Oral at bedtime  LORazepam   Injectable 1 milliGRAM(s) IV Push <User Schedule> PRN Anxiety  melatonin 5 milliGRAM(s) Oral at bedtime  ondansetron Injectable 4 milliGRAM(s) IV Push every 6 hours PRN Nausea and/or Vomiting  venlafaxine XR. 150 milliGRAM(s) Oral daily    Cardiovascular Medications  metoprolol tartrate Injectable 10 milliGRAM(s) IV Push every 6 hours    Gastrointestinal Medications  ergocalciferol 19555 Unit(s) Oral <User Schedule>  lipid, fat emulsion (Fish Oil and Plant Based) 20% Infusion 0.54 Gm/kG/Day IV Continuous <Continuous>  Parenteral Nutrition - Adult 1 Each TPN Continuous <Continuous>  teduglutide Injectable 4.7 milliGRAM(s) SubCutaneous every 24 hours  ursodiol Suspension 300 milliGRAM(s) Oral every 8 hours    Hematologic/Oncologic Medications  enoxaparin Injectable 40 milliGRAM(s) SubCutaneous every 24 hours  epoetin matt-epbx (RETACRIT) Injectable 54973 Unit(s) SubCutaneous every 7 days    Endocrine/Metabolic Medications  cholestyramine Powder (Sugar-Free) 4 Gram(s) Oral daily  glucagon  Injectable 1 milliGRAM(s) IntraMuscular once  levothyroxine Injectable 30 MICROGram(s) IV Push at bedtime    Topical/Other Medications  chlorhexidine 0.12% Liquid 15 milliLiter(s) Swish and Spit two times a day PRN oral hygeine  chlorhexidine 2% Cloths 1 Application(s) Topical <User Schedule>  EPINEPHrine   1 mG/mL (1:1,000) Topical Solution 1 Application(s) Topical every 24 hours PRN for wound bleeding  timolol 0.5% Solution 1 Drop(s) Both EYES daily    MEDICATIONS  (PRN):  chlorhexidine 0.12% Liquid 15 milliLiter(s) Swish and Spit two times a day PRN oral hygeine  EPINEPHrine   1 mG/mL (1:1,000) Topical Solution 1 Application(s) Topical every 24 hours PRN for wound bleeding  LORazepam   Injectable 1 milliGRAM(s) IV Push <User Schedule> PRN Anxiety  ondansetron Injectable 4 milliGRAM(s) IV Push every 6 hours PRN Nausea and/or Vomiting    I&O's Detail    29 Jan 2024 07:01  -  30 Jan 2024 07:00  --------------------------------------------------------  IN:    Fat Emulsion (Fish Oil &amp; Plant Based) 20% Infusion: 249.6 mL    Oral Fluid: 660 mL    TPN (Total Parenteral Nutrition): 2069 mL  Total IN: 2978.6 mL    OUT:    Drain (mL): 750 mL    Drain (mL): 60 mL    Voided (mL): 1350 mL  Total OUT: 2160 mL    Total NET: 818.6 mL    30 Jan 2024 07:01  -  30 Jan 2024 11:07  --------------------------------------------------------  IN:    TPN (Total Parenteral Nutrition): 233 mL  Total IN: 233 mL    OUT:  Total OUT: 0 mL    Total NET: 233 mL    Vital Signs Last 24 Hrs  T(C): 36.7 (30 Jan 2024 05:20), Max: 36.8 (30 Jan 2024 01:19)  T(F): 98 (30 Jan 2024 05:20), Max: 98.2 (30 Jan 2024 01:19)  HR: 91 (30 Jan 2024 09:00) (89 - 93)  BP: 118/59 (30 Jan 2024 09:00) (116/57 - 164/72)  BP(mean): 82 (30 Jan 2024 09:00) (82 - 116)  RR: 20 (30 Jan 2024 09:00) (20 - 32)  SpO2: 99% (30 Jan 2024 09:00) (94% - 100%)    Parameters below as of 30 Jan 2024 09:00  Patient On (Oxygen Delivery Method): BiPAP/CPAP    O2 Concentration (%): 40    Neurological: AAOx3, CNII-XII intact,  strength 5/5 b/l  ENT: mucus membrane moist  Cardiovascular: RRR  Respiratory: CTA  Gastrointestinal: soft, NT, ND, BS+, no bleeding from ileostomy, fistula darkish red content, no active bleeding  Extremities: warm, dependent edema improved   Vascular: no cyanosis/erythema  Skin: no rashes  MSK: no joint swelling.     LABS:                        8.4    8.68  )-----------( 141      ( 29 Jan 2024 04:47 )             26.4     01-29    136  |  104  |  46<H>  ----------------------------<  136<H>  4.5   |  22  |  1.24    Ca    8.2<L>      29 Jan 2024 04:47  Phos  3.7     01-29  Mg     2.1     01-29    TPro  7.7  /  Alb  2.1<L>  /  TBili  4.6<H>  /  DBili  3.0<H>  /  AST  87<H>  /  ALT  56<H>  /  AlkPhos  114  01-29    Urinalysis Basic - ( 29 Jan 2024 04:47 )    Color: x / Appearance: x / SG: x / pH: x  Gluc: 136 mg/dL / Ketone: x  / Bili: x / Urobili: x   Blood: x / Protein: x / Nitrite: x   Leuk Esterase: x / RBC: x / WBC x   Sq Epi: x / Non Sq Epi: x / Bacteria: x

## 2024-01-30 NOTE — PROGRESS NOTE ADULT - SUBJECTIVE AND OBJECTIVE BOX
ON: no events overnight    SUBJECTIVE: Pt seen and examined at bedside this am by surgery team. Tolerating diet, no further episodes of bleeding on wound manager.     MEDICATIONS  (STANDING):  chlorhexidine 2% Cloths 1 Application(s) Topical <User Schedule>  cholestyramine Powder (Sugar-Free) 4 Gram(s) Oral daily  enoxaparin Injectable 40 milliGRAM(s) SubCutaneous every 24 hours  epoetin matt-epbx (RETACRIT) Injectable 12616 Unit(s) SubCutaneous every 7 days  ergocalciferol 82818 Unit(s) Oral <User Schedule>  gabapentin 100 milliGRAM(s) Oral at bedtime  glucagon  Injectable 1 milliGRAM(s) IntraMuscular once  levothyroxine Injectable 30 MICROGram(s) IV Push at bedtime  lipid, fat emulsion (Fish Oil and Plant Based) 20% Infusion 0.5319 Gm/kG/Day (20.83 mL/Hr) IV Continuous <Continuous>  lipid, fat emulsion (Fish Oil and Plant Based) 20% Infusion 0.54 Gm/kG/Day (21.2 mL/Hr) IV Continuous <Continuous>  melatonin 5 milliGRAM(s) Oral at bedtime  metoprolol tartrate Injectable 10 milliGRAM(s) IV Push every 6 hours  Parenteral Nutrition - Adult 1 Each TPN Continuous <Continuous>  Parenteral Nutrition - Adult 1 Each TPN Continuous <Continuous>  teduglutide Injectable 4.7 milliGRAM(s) SubCutaneous every 24 hours  timolol 0.5% Solution 1 Drop(s) Both EYES daily  ursodiol Suspension 300 milliGRAM(s) Oral every 8 hours  venlafaxine XR. 150 milliGRAM(s) Oral daily    MEDICATIONS  (PRN):  chlorhexidine 0.12% Liquid 15 milliLiter(s) Swish and Spit two times a day PRN oral hygeine  EPINEPHrine   1 mG/mL (1:1,000) Topical Solution 1 Application(s) Topical every 24 hours PRN for wound bleeding  LORazepam   Injectable 1 milliGRAM(s) IV Push <User Schedule> PRN Anxiety  ondansetron Injectable 4 milliGRAM(s) IV Push every 6 hours PRN Nausea and/or Vomiting      Vital Signs Last 24 Hrs  T(C): 36.7 (30 Jan 2024 05:20), Max: 36.8 (30 Jan 2024 01:19)  T(F): 98 (30 Jan 2024 05:20), Max: 98.2 (30 Jan 2024 01:19)  HR: 91 (30 Jan 2024 07:00) (89 - 95)  BP: 164/72 (30 Jan 2024 07:00) (116/56 - 164/72)  BP(mean): 103 (30 Jan 2024 07:00) (80 - 116)  RR: 23 (30 Jan 2024 07:00) (20 - 32)  SpO2: 100% (30 Jan 2024 07:00) (94% - 100%)    Parameters below as of 30 Jan 2024 07:00  Patient On (Oxygen Delivery Method): BiPAP/CPAP    O2 Concentration (%): 40    Physical Exam:  General: Patient is doing well and lying in bed comfortably  Constitutional: alert and awake, A&O x 4  Pulm: no respiratory distress  CV: Regular rate and rhythm, in and out of afib/flutter  Abd: soft, nontender, nondistended. No rebound, no guarding. Wound manager in place, no bleeding, no leaks. Ileostomy and wound manager with soft semi-formed output, brown in color; perc cony capped.  Extremities: warm, well perfused      I&O's Detail    29 Jan 2024 07:01  -  30 Jan 2024 07:00  --------------------------------------------------------  IN:    Fat Emulsion (Fish Oil &amp; Plant Based) 20% Infusion: 270.4 mL    Oral Fluid: 660 mL    TPN (Total Parenteral Nutrition): 2069 mL  Total IN: 2999.4 mL    OUT:    Drain (mL): 750 mL    Drain (mL): 60 mL    Voided (mL): 1350 mL  Total OUT: 2160 mL    Total NET: 839.4 mL      30 Jan 2024 07:01  -  30 Jan 2024 07:47  --------------------------------------------------------  IN:    Fat Emulsion (Fish Oil &amp; Plant Based) 20% Infusion: 20.8 mL    TPN (Total Parenteral Nutrition): 153 mL  Total IN: 173.8 mL    OUT:  Total OUT: 0 mL    Total NET: 173.8 mL          LABS:                        8.4    8.68  )-----------( 141      ( 29 Jan 2024 04:47 )             26.4     01-29    136  |  104  |  46<H>  ----------------------------<  136<H>  4.5   |  22  |  1.24    Ca    8.2<L>      29 Jan 2024 04:47  Phos  3.7     01-29  Mg     2.1     01-29    TPro  7.7  /  Alb  2.1<L>  /  TBili  4.6<H>  /  DBili  3.0<H>  /  AST  87<H>  /  ALT  56<H>  /  AlkPhos  114  01-29      Urinalysis Basic - ( 29 Jan 2024 04:47 )    Color: x / Appearance: x / SG: x / pH: x  Gluc: 136 mg/dL / Ketone: x  / Bili: x / Urobili: x   Blood: x / Protein: x / Nitrite: x   Leuk Esterase: x / RBC: x / WBC x   Sq Epi: x / Non Sq Epi: x / Bacteria: x

## 2024-01-31 NOTE — PROGRESS NOTE ADULT - ASSESSMENT
78 year old male with a PMHx of Crohn's, AFib/Flutter s/p DCCVs on amiodarone, remote ileocectomy and open appendectomy. Admitted (6/23) for SBO vs Crohns flare, s/p NGT decompression and s/p lap converted to open TRE, SBR x 3, left in discontinuity with abthera vac on (6/27), RTOR for ileocolic resection, small bowel anastomosis, and abdominal wall closure on (6/28), c/b fluid collection s/p IR aspiration of perihepatic fluid on (7/3), c/b wound dehiscence s/p RTOR exlap, washout, ileocolic resection with end ileostomy, blow hole colostomy, red rubber from ileostomy to small bowel anastomosis; vicryl bridging mesh on (7/5) transferred to SICU postoperatively for hemodynamic monitoring, with hospital course complicated by periods of severe ELVI and hyponatremia, which resolved but stepped back up for to SICU on (9/10) for acute AMS, intermittent hypoglycemia, AFib with RVR. Percutaneous Cholecystostomy placed on (9/11) for enlarged GB, PCT capped 10/5 and uncapped 10/25 for hyperbilirubinemia, Right brachial DVT, left basillic and cephalic superficial thrombus on duplex 11/2, CT 11/14 with ALDEN ground glass opacity of unclear etiology, completed empiric 7day cefepime course 11/22, rising T-bili of unclear etiology now w resolved candida glabrata fungemia, ELVI improving. Gattex started on 1/24, doing well tolerating regular diet.     - Continue TPN  - Continue Gattex   - Continue sicu care  - Team 5 will follow

## 2024-01-31 NOTE — PROGRESS NOTE ADULT - SUBJECTIVE AND OBJECTIVE BOX
SUBJECTIVE: Pt seen and examined at bedside this am by surgery team. Tolerating diet, no events overnight     MEDICATIONS  (STANDING):  chlorhexidine 2% Cloths 1 Application(s) Topical <User Schedule>  cholestyramine Powder (Sugar-Free) 4 Gram(s) Oral daily  enoxaparin Injectable 40 milliGRAM(s) SubCutaneous every 24 hours  epoetin matt-epbx (RETACRIT) Injectable 63376 Unit(s) SubCutaneous every 7 days  ergocalciferol 20929 Unit(s) Oral <User Schedule>  gabapentin 100 milliGRAM(s) Oral at bedtime  glucagon  Injectable 1 milliGRAM(s) IntraMuscular once  levothyroxine Injectable 30 MICROGram(s) IV Push at bedtime  lipid, fat emulsion (Fish Oil and Plant Based) 20% Infusion 0.54 Gm/kG/Day (20.83 mL/Hr) IV Continuous <Continuous>  LORazepam   Injectable 0.5 milliGRAM(s) IV Push every 24 hours  melatonin 5 milliGRAM(s) Oral at bedtime  metoprolol tartrate Injectable 10 milliGRAM(s) IV Push every 6 hours  Parenteral Nutrition - Adult 1 Each TPN Continuous <Continuous>  Parenteral Nutrition - Adult 1 Each TPN Continuous <Continuous>  teduglutide Injectable 4.7 milliGRAM(s) SubCutaneous every 24 hours  timolol 0.5% Solution 1 Drop(s) Both EYES daily  ursodiol Suspension 300 milliGRAM(s) Oral every 8 hours  venlafaxine XR. 150 milliGRAM(s) Oral daily    MEDICATIONS  (PRN):  chlorhexidine 0.12% Liquid 15 milliLiter(s) Swish and Spit two times a day PRN oral hygeine  EPINEPHrine   1 mG/mL (1:1,000) Topical Solution 1 Application(s) Topical every 24 hours PRN for wound bleeding  ondansetron Injectable 4 milliGRAM(s) IV Push every 6 hours PRN Nausea and/or Vomiting      Vital Signs Last 24 Hrs  T(C): 36.8 (31 Jan 2024 12:00), Max: 36.8 (31 Jan 2024 12:00)  T(F): 98.3 (31 Jan 2024 12:00), Max: 98.3 (31 Jan 2024 12:00)  HR: 91 (31 Jan 2024 12:00) (90 - 92)  BP: 131/62 (31 Jan 2024 12:00) (114/56 - 141/67)  BP(mean): 88 (31 Jan 2024 12:00) (77 - 96)  RR: 26 (31 Jan 2024 12:00) (18 - 26)  SpO2: 100% (31 Jan 2024 08:29) (100% - 100%)    Parameters below as of 31 Jan 2024 12:00  Patient On (Oxygen Delivery Method): room air      Physical Exam:  General: Patient is doing well and lying in bed comfortably  Constitutional: alert and awake, A&O x 4  Pulm: no respiratory distress  CV: Regular rate and rhythm, in and out of afib/flutter  Abd: soft, nontender, nondistended. No rebound, no guarding. Wound manager in place, no bleeding, no leaks. Ileostomy and wound manager with soft semi-formed output, brown in color; perc cony capped.  Extremities: warm, well perfused        I&O's Detail    30 Jan 2024 07:01  -  31 Jan 2024 07:00  --------------------------------------------------------  IN:    Fat Emulsion (Fish Oil &amp; Plant Based) 20% Infusion: 228.8 mL    Oral Fluid: 437 mL    TPN (Total Parenteral Nutrition): 2302 mL  Total IN: 2967.8 mL    OUT:    Drain (mL): 655 mL    Drain (mL): 5 mL    Voided (mL): 1190 mL  Total OUT: 1850 mL    Total NET: 1117.8 mL      31 Jan 2024 07:01  -  31 Jan 2024 15:57  --------------------------------------------------------  IN:  Total IN: 0 mL    OUT:    Drain (mL): 0 mL    Drain (mL): 250 mL    Voided (mL): 200 mL  Total OUT: 450 mL    Total NET: -450 mL          LABS:                        8.7    8.63  )-----------( 152      ( 31 Jan 2024 05:30 )             28.1     01-31    135  |  102  |  46<H>  ----------------------------<  137<H>  4.3   |  25  |  1.20    Ca    8.4      31 Jan 2024 05:30  Phos  3.8     01-31  Mg     2.1     01-31    TPro  8.2  /  Alb  2.4<L>  /  TBili  4.6<H>  /  DBili  3.0<H>  /  AST  93<H>  /  ALT  58<H>  /  AlkPhos  120  01-31      Urinalysis Basic - ( 31 Jan 2024 05:30 )    Color: x / Appearance: x / SG: x / pH: x  Gluc: 137 mg/dL / Ketone: x  / Bili: x / Urobili: x   Blood: x / Protein: x / Nitrite: x   Leuk Esterase: x / RBC: x / WBC x   Sq Epi: x / Non Sq Epi: x / Bacteria: x        RADIOLOGY & ADDITIONAL STUDIES:

## 2024-01-31 NOTE — PROGRESS NOTE ADULT - ASSESSMENT
78M w PMH Crohn's, AFib/Flutter s/p DCCVs on amiodarone, remote ileocectomy and open appendectomy. Admitted (6/23) for SBO vs Crohns flare, s/p NGT decompression and s/p lap converted to open TRE, SBR x 3, left in discontinuity with abthera vac on (6/27), RTOR for ileocolic resection, small bowel anastomosis, and abdominal wall closure on (6/28), c/b fluid collection s/p IR aspiration of perihepatic fluid on (7/3), c/b wound dehiscence s/p RTOR exlap, washout, ileocolic resection with end ileostomy, blow hole colostomy, red rubber from ileostomy to small bowel anastomosis; vicryl bridging mesh on (7/5) transferred to SICU postoperatively for hemodynamic monitoring, with hospital course complicated by periods of severe ELVI and hyponatremia, which resolved but stepped back up for to SICU on (9/10) for acute AMS, intermittent hypoglycemia, AFib with RVR. Percutaneous Cholecystostomy placed on (9/11) for enlarged GB, PCT capped 10/5 and uncapped 10/25 for hyperbilirubinemia, Right brachial DVT, left basillic and cephalic superficial thrombus on duplex 11/2, CT 11/14 with ALDEN ground glass opacity of unclear etiology, completed empiric 7day cefepime course 11/22, and another course of 7day cefepime for PNA  (1/15-23). rising T-bili of unclear etiology, resolved candida glabrata fungemia.    Continue with Gattex  Reg Diet  SQL/SCDs/OOBA/IS  Daily wound manager changes  Continue care per primary/SICU

## 2024-01-31 NOTE — PROGRESS NOTE ADULT - SUBJECTIVE AND OBJECTIVE BOX
INTERVAL/OVERNIGHT EVENTS: NAEO.     SUBJECTIVE: No complaints. States being well rested, anxiety under control. Pain controlled. No SOB/CP. No N/V. Voids without issues.     MEDICATIONS  (STANDING):  chlorhexidine 2% Cloths 1 Application(s) Topical <User Schedule>  cholestyramine Powder (Sugar-Free) 4 Gram(s) Oral daily  enoxaparin Injectable 40 milliGRAM(s) SubCutaneous every 24 hours  epoetin matt-epbx (RETACRIT) Injectable 39125 Unit(s) SubCutaneous every 7 days  ergocalciferol 25924 Unit(s) Oral <User Schedule>  gabapentin 100 milliGRAM(s) Oral at bedtime  glucagon  Injectable 1 milliGRAM(s) IntraMuscular once  levothyroxine Injectable 30 MICROGram(s) IV Push at bedtime  lipid, fat emulsion (Fish Oil and Plant Based) 20% Infusion 0.54 Gm/kG/Day (20.83 mL/Hr) IV Continuous <Continuous>  lipid, fat emulsion (Fish Oil and Plant Based) 20% Infusion 0.54 Gm/kG/Day (20.83 mL/Hr) IV Continuous <Continuous>  LORazepam   Injectable 0.5 milliGRAM(s) IV Push every 24 hours  melatonin 5 milliGRAM(s) Oral at bedtime  metoprolol tartrate Injectable 10 milliGRAM(s) IV Push every 6 hours  Parenteral Nutrition - Adult 1 Each TPN Continuous <Continuous>  Parenteral Nutrition - Adult 1 Each TPN Continuous <Continuous>  teduglutide Injectable 4.7 milliGRAM(s) SubCutaneous every 24 hours  timolol 0.5% Solution 1 Drop(s) Both EYES daily  ursodiol Suspension 300 milliGRAM(s) Oral every 8 hours  venlafaxine XR. 150 milliGRAM(s) Oral daily    MEDICATIONS  (PRN):  chlorhexidine 0.12% Liquid 15 milliLiter(s) Swish and Spit two times a day PRN oral hygeine  EPINEPHrine   1 mG/mL (1:1,000) Topical Solution 1 Application(s) Topical every 24 hours PRN for wound bleeding  ondansetron Injectable 4 milliGRAM(s) IV Push every 6 hours PRN Nausea and/or Vomiting      Drips:     ICU Vital Signs Last 24 Hrs  T(C): 36.7 (31 Jan 2024 05:59), Max: 36.7 (30 Jan 2024 22:11)  T(F): 98.1 (31 Jan 2024 05:59), Max: 98.1 (31 Jan 2024 05:59)  HR: 91 (31 Jan 2024 08:29) (89 - 92)  BP: 137/62 (31 Jan 2024 08:00) (114/56 - 156/71)  BP(mean): 89 (31 Jan 2024 08:00) (77 - 102)  ABP: --  ABP(mean): --  RR: 18 (31 Jan 2024 08:29) (16 - 24)  SpO2: 100% (31 Jan 2024 08:29) (100% - 100%)    O2 Parameters below as of 31 Jan 2024 08:29  Patient On (Oxygen Delivery Method): BiPAP/CPAP    O2 Concentration (%): 40    Neurological: AAOx3, CNII-XII intact,  strength 5/5 b/l  ENT: mucus membrane moist  Cardiovascular: RRR  Respiratory: CTA  Gastrointestinal: soft, NT, ND, BS+, no bleeding from ileostomy, fistula darkish red content, no active bleeding  Extremities: warm, dependent edema improved   Vascular: no cyanosis/erythema  Skin: no rashes, generalized jaundice present   MSK: no joint swelling.     Lines/tubes/drains:  Poole:	      Vent settings:      I&O's Summary    30 Jan 2024 07:01  -  31 Jan 2024 07:00  --------------------------------------------------------  IN: 2967.8 mL / OUT: 1850 mL / NET: 1117.8 mL    31 Jan 2024 07:01  -  31 Jan 2024 10:39  --------------------------------------------------------  IN: 0 mL / OUT: 450 mL / NET: -450 mL        LABS:                        8.7    8.63  )-----------( 152      ( 31 Jan 2024 05:30 )             28.1     01-31    135  |  102  |  46<H>  ----------------------------<  137<H>  4.3   |  25  |  1.20    Ca    8.4      31 Jan 2024 05:30  Phos  3.8     01-31  Mg     2.1     01-31    TPro  8.2  /  Alb  2.4<L>  /  TBili  4.6<H>  /  DBili  3.0<H>  /  AST  93<H>  /  ALT  58<H>  /  AlkPhos  120  01-31      Urinalysis Basic - ( 31 Jan 2024 05:30 )    Color: x / Appearance: x / SG: x / pH: x  Gluc: 137 mg/dL / Ketone: x  / Bili: x / Urobili: x   Blood: x / Protein: x / Nitrite: x   Leuk Esterase: x / RBC: x / WBC x   Sq Epi: x / Non Sq Epi: x / Bacteria: x      CAPILLARY BLOOD GLUCOSE        LIVER FUNCTIONS - ( 31 Jan 2024 05:30 )  Alb: 2.4 g/dL / Pro: 8.2 g/dL / ALK PHOS: 120 U/L / ALT: 58 U/L / AST: 93 U/L / GGT: x             Cultures:    RADIOLOGY & ADDITIONAL STUDIES:

## 2024-01-31 NOTE — PROGRESS NOTE ADULT - SUBJECTIVE AND OBJECTIVE BOX
SSUBJECTIVE: Pt seen and examined at bedside this am by surgery team. Patient is lying comfortably in bed with no complaints; feels breathing is good. Tolerating diet. Patient denies fever, nausea, vomiting, chest pain, and shortness of breath    MEDICATIONS  (STANDING):  chlorhexidine 2% Cloths 1 Application(s) Topical <User Schedule>  cholestyramine Powder (Sugar-Free) 4 Gram(s) Oral daily  enoxaparin Injectable 40 milliGRAM(s) SubCutaneous every 24 hours  epoetin matt-epbx (RETACRIT) Injectable 18937 Unit(s) SubCutaneous every 7 days  ergocalciferol 90635 Unit(s) Oral <User Schedule>  gabapentin 100 milliGRAM(s) Oral at bedtime  glucagon  Injectable 1 milliGRAM(s) IntraMuscular once  levothyroxine Injectable 30 MICROGram(s) IV Push at bedtime  lipid, fat emulsion (Fish Oil and Plant Based) 20% Infusion 0.54 Gm/kG/Day (20.83 mL/Hr) IV Continuous <Continuous>  LORazepam   Injectable 0.5 milliGRAM(s) IV Push every 24 hours  melatonin 5 milliGRAM(s) Oral at bedtime  metoprolol tartrate Injectable 10 milliGRAM(s) IV Push every 6 hours  Parenteral Nutrition - Adult 1 Each TPN Continuous <Continuous>  Parenteral Nutrition - Adult 1 Each TPN Continuous <Continuous>  teduglutide Injectable 4.7 milliGRAM(s) SubCutaneous every 24 hours  timolol 0.5% Solution 1 Drop(s) Both EYES daily  ursodiol Suspension 300 milliGRAM(s) Oral every 8 hours  venlafaxine XR. 150 milliGRAM(s) Oral daily    MEDICATIONS  (PRN):  chlorhexidine 0.12% Liquid 15 milliLiter(s) Swish and Spit two times a day PRN oral hygeine  EPINEPHrine   1 mG/mL (1:1,000) Topical Solution 1 Application(s) Topical every 24 hours PRN for wound bleeding  ondansetron Injectable 4 milliGRAM(s) IV Push every 6 hours PRN Nausea and/or Vomiting      Vital Signs Last 24 Hrs  T(C): 36.8 (31 Jan 2024 18:00), Max: 36.8 (31 Jan 2024 12:00)  T(F): 98.2 (31 Jan 2024 18:00), Max: 98.3 (31 Jan 2024 12:00)  HR: 91 (31 Jan 2024 17:00) (90 - 92)  BP: 141/62 (31 Jan 2024 17:00) (114/56 - 141/67)  BP(mean): 89 (31 Jan 2024 17:00) (77 - 96)  RR: 24 (31 Jan 2024 17:00) (18 - 26)  SpO2: 96% (31 Jan 2024 17:00) (96% - 100%)    Parameters below as of 31 Jan 2024 17:00  Patient On (Oxygen Delivery Method): room air        PPhysical Exam  General: Patient is doing well and sitting in bed comfortably  Constitutional: alert and oriented   Pulm: no respiratory distress  CV: Regular rate  Abd: soft, nontender, nondistended. No rebound, no guarding. Wound manager in place, no bleeding, no leaks. soft semi-formed output; Perc coyn capped; Ileostomy with minimal output  Extremities: warm, well perfused, mild peripheral edema    I&O's Detail    30 Jan 2024 07:01  -  31 Jan 2024 07:00  --------------------------------------------------------  IN:    Fat Emulsion (Fish Oil &amp; Plant Based) 20% Infusion: 228.8 mL    Oral Fluid: 437 mL    TPN (Total Parenteral Nutrition): 2302 mL  Total IN: 2967.8 mL    OUT:    Drain (mL): 655 mL    Drain (mL): 5 mL    Voided (mL): 1190 mL  Total OUT: 1850 mL    Total NET: 1117.8 mL      31 Jan 2024 07:01  -  31 Jan 2024 19:44  --------------------------------------------------------  IN:    Fat Emulsion (Fish Oil &amp; Plant Based) 20% Infusion: 41.6 mL    Oral Fluid: 237 mL    TPN (Total Parenteral Nutrition): 313 mL  Total IN: 591.6 mL    OUT:    Drain (mL): 925 mL    Drain (mL): 0 mL    Voided (mL): 525 mL  Total OUT: 1450 mL    Total NET: -858.4 mL        LABS:                        8.7    8.63  )-----------( 152      ( 31 Jan 2024 05:30 )             28.1     01-31    135  |  102  |  46<H>  ----------------------------<  137<H>  4.3   |  25  |  1.20    Ca    8.4      31 Jan 2024 05:30  Phos  3.8     01-31  Mg     2.1     01-31    TPro  8.2  /  Alb  2.4<L>  /  TBili  4.6<H>  /  DBili  3.0<H>  /  AST  93<H>  /  ALT  58<H>  /  AlkPhos  120  01-31      Urinalysis Basic - ( 31 Jan 2024 05:30 )    Color: x / Appearance: x / SG: x / pH: x  Gluc: 137 mg/dL / Ketone: x  / Bili: x / Urobili: x   Blood: x / Protein: x / Nitrite: x   Leuk Esterase: x / RBC: x / WBC x   Sq Epi: x / Non Sq Epi: x / Bacteria: x

## 2024-01-31 NOTE — PROGRESS NOTE ADULT - ASSESSMENT
78M w PMH Crohn's, AFib/Flutter s/p DCCVs on amiodarone, remote ileocectomy and open appendectomy. Admitted (6/23) for SBO vs Crohns flare, s/p NGT decompression and s/p lap converted to open TRE, SBR x 3, left in discontinuity with abthera vac on (6/27), RTOR for ileocolic resection, small bowel anastomosis, and abdominal wall closure on (6/28), c/b fluid collection s/p IR aspiration of perihepatic fluid on (7/3), c/b wound dehiscence s/p RTOR exlap, washout, ileocolic resection with end ileostomy, blow hole colostomy, red rubber from ileostomy to small bowel anastomosis; vicryl bridging mesh on (7/5) transferred to SICU postoperatively for hemodynamic monitoring, with hospital course complicated by periods of severe ELVI and hyponatremia, which resolved but stepped back up for to SICU on (9/10) for acute AMS, intermittent hypoglycemia, AFib with RVR. Percutaneous Cholecystostomy placed on (9/11) for enlarged GB, PCT capped 10/5 and uncapped 10/25 for hyperbilirubinemia, Right brachial DVT, left basillic and cephalic superficial thrombus on duplex 11/2, CT 11/14 with ALDEN ground glass opacity of unclear etiology, completed empiric 7day cefepime course 11/22, and another course of 7day cefepime for PNA  (1/15-23). rising T-bili of unclear etiology, resolved candida glabrata fungemia, ELVI improving.     NEURO: Hx of depression: cont Effexor (halved dose in setting of renal dysfunx). Anxiety: Lorazepam 0.5 PRN. Holistic consult for anxiety and insomnia. Gabapentin for restless leg syndrome.   HEENT: Timolol eye gtt added on 1/10 for additional systemic BB support in setting of malabsorption  CV: been getting lasix for past few days for volume overload. edema much improved. will hold off on diuresis today. Hx of Afib and recent Aflutter alternating with sinus tachycardia (HR 100s): Cont Lopressor to 10 q6h, added timolol eye ggt for attempted systemic affect. Started therapeutic Lovenox on 1/10, but held since 1/15 in setting of dark red output from ileostomy. s/p previous DCCV, off Amiodarone and Lipitor due to persistent transaminitis. BLE duplex (1/5) no DVT, TTE  (7/18) - PASP 64mmHg, EF 65-70%. holding Losartan & norvasc because not absorbing PO meds, TTE (1/4) LVEF 75%, mild/mod AR, PASP 40, RVEF wnl.  PULM: Atelectasis/dyspnea improved clinically: cont 1 hr of BiPAP every 12hrs and nasal canula or room air. PRN duonebs for wheezing. Encourage OOB and IS. CT from 11/14 with ALDEN ground glass opacity of unclear etiology. COVID negative. RVP negative. Completed 7d empiric cefepime 11/22 to cover for potential PNA and another course of cefepime (1/15-1/23).    GI/FEN: Low res, low fat, high protein diet. Cont Ensure max x1/day however pt likely not absorbing adequately due to proximal fistula. Folate, thiamine. PICC replaced 12/4, switched out PICC on 1/4. 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 Transaminitis slowly improved, elevated T bili, s/p Perc Deb on 9/11, uncapped on 1/15, recap on (1/17). been seen by Hepatology - MRCP 10/30 no obstruction, cont ursodiol, RUQ US 11/25 CBD 5mm, hepatic vessels patent. Cont Vit D weekly.   : Voids. ELVI improved: stopped famotidine given renal dysfunction. MARLO Duplex and RP US unremarkable, CK wnl.    ENDO: Hx hypothyroid: IV Synthroid,  Repeat thyroid studies WNL 1/3/24.   ID: currently not on any abx. completed cefepime for PNA (1/15-23) BCx 11/22 grew candida glabrata, likely CLABSI. s/p Caspo & completed Fluconazole course (12/1-12/9). Ophthalmology evaluated - normal ocular exam. Echo no vegetation. PICC replaced on 12/4. Cont Nystatin powder. // DC: caspofungin (11/24-12/1). empiric 7days cefepime (11/15-11/22), C. tertium, Lactobacillus from IR cx 7/3, and candida albicans, lactobacillus, vanc sensitive E faecium, vanc resistant E gallinarum, vanc resistant E casseliflavus, lactobacillus from OR cx 7/5. Completed course of abx with Imipenem (9/10-9/15). Imipenem (8/26--) imipenem (6/30-7/12, 7/23-7/30), Dapto (6/30-7/5 and 7/23-7/24). Empiric Dapto (8/23-24) and Cefepime (8/23-24).   PPX: SCDs, held therapeutic Lovenox for Afib in setting of dark blood from ileostomy, currently on PPX lovenox given hx of thrombosis.   HEME: Anemia - continue Epogen weekly. S/p Iron Sucrose 200 qd x 3 days for chronic anemia. recurrent drop in Hgb requiring intermittent unit PRBC.  repeat Hgb 8.9, macrocytic anemia,  LINES: PIVs, RUE PICC placed (1/5--) will plan to switch PICC once a month in setting of fungemia history, next switch will be around 2/5 // DC: LUE PICC (9/1-11/1 ), RUE PICC: (11/1-11/24), LUE PICC (12/4-1/5)  WOUNDS/DRAINS: Abdominal wound and fistula with wound manager in place dressing change daily. Had recurrent punctate bleeding at wound bed, s/p placed surgicel, attempt to stitch, electrocautery and epi soaked gauze. Cont epinephrine soaked gauze at bedside as needed for wound bleeding. RLQ Ostomy. IR perc deb tube recapped 1/17 // DC: L Clay drain.  PT: Ambulate as tolerated OOB to chair daily.  DISPO: SICU due to complicated hospital course. but will downgrade labs to every other day

## 2024-02-01 NOTE — PROGRESS NOTE ADULT - ASSESSMENT
78M w PMH Crohn's, AFib/Flutter s/p DCCVs on amiodarone, remote ileocectomy and open appendectomy. Admitted (6/23) for SBO vs Crohns flare, s/p NGT decompression and s/p lap converted to open TRE, SBR x 3, left in discontinuity with abthera vac on (6/27), RTOR for ileocolic resection, small bowel anastomosis, and abdominal wall closure on (6/28), c/b fluid collection s/p IR aspiration of perihepatic fluid on (7/3), c/b wound dehiscence s/p RTOR exlap, washout, ileocolic resection with end ileostomy, blow hole colostomy, red rubber from ileostomy to small bowel anastomosis; vicryl bridging mesh on (7/5) transferred to SICU postoperatively for hemodynamic monitoring, with hospital course complicated by periods of severe ELVI and hyponatremia, which resolved but stepped back up for to SICU on (9/10) for acute AMS, intermittent hypoglycemia, AFib with RVR. Percutaneous Cholecystostomy placed on (9/11) for enlarged GB, PCT capped 10/5 and uncapped 10/25 for hyperbilirubinemia, Right brachial DVT, left basillic and cephalic superficial thrombus on duplex 11/2, CT 11/14 with ALDEN ground glass opacity of unclear etiology, completed empiric 7day cefepime course 11/22, and another course of 7day cefepime for PNA  (1/15-23). rising T-bili of unclear etiology, resolved candida glabrata fungemia.    Continue with Gattex  Reg Diet  SQL/SCDs/OOBA/IS  Regular wound manager changes  Continue care per primary/SICU

## 2024-02-01 NOTE — PROGRESS NOTE ADULT - SUBJECTIVE AND OBJECTIVE BOX
Interval Events:   No events overnight  Patient seen and examined at bedside.      Allergies    penicillin (Angioedema)    Intolerances        Vital Signs Last 24 Hrs  T(C): 36.7 (01 Feb 2024 06:10), Max: 37.1 (31 Jan 2024 21:57)  T(F): 98 (01 Feb 2024 06:10), Max: 98.7 (31 Jan 2024 21:57)  HR: 93 (01 Feb 2024 06:28) (90 - 93)  BP: 147/68 (01 Feb 2024 06:28) (127/60 - 167/81)  BP(mean): 98 (01 Feb 2024 06:28) (87 - 113)  RR: 21 (01 Feb 2024 06:28) (18 - 26)  SpO2: 96% (01 Feb 2024 06:28) (93% - 100%)    Parameters below as of 01 Feb 2024 06:28  Patient On (Oxygen Delivery Method): room air        01-31 @ 07:01 - 02-01 @ 07:00  --------------------------------------------------------  IN: 2788.6 mL / OUT: 2690 mL / NET: 98.6 mL      01-31 @ 07:01  -  02-01 @ 07:00  --------------------------------------------------------  IN: 2788.6 mL / OUT: 2690 mL / NET: 98.6 mL        Physical Exam:     Neurological: AAOx3, CNII-XII intact,  strength 5/5 b/l  ENT: mucus membrane moist  Cardiovascular: RRR  Respiratory: CTA  Gastrointestinal: soft, NT, ND, BS+, no bleeding from ileostomy, fistula darkish red content, no active bleeding  Extremities: warm, dependent edema improved   Vascular: no cyanosis/erythema  Skin: no rashes, generalized jaundice present   MSK: no joint swelling.       LABS:      CBC Full  -  ( 31 Jan 2024 05:30 )  WBC Count : 8.63 K/uL  RBC Count : 2.82 M/uL  Hemoglobin : 8.7 g/dL  Hematocrit : 28.1 %  Platelet Count - Automated : 152 K/uL  Mean Cell Volume : 99.6 fl  Mean Cell Hemoglobin : 30.9 pg  Mean Cell Hemoglobin Concentration : 31.0 gm/dL  Auto Neutrophil # : x  Auto Lymphocyte # : x  Auto Monocyte # : x  Auto Eosinophil # : x  Auto Basophil # : x  Auto Neutrophil % : x  Auto Lymphocyte % : x  Auto Monocyte % : x  Auto Eosinophil % : x  Auto Basophil % : x    01-31    135  |  102  |  46<H>  ----------------------------<  137<H>  4.3   |  25  |  1.20    Ca    8.4      31 Jan 2024 05:30  Phos  3.8     01-31  Mg     2.1     01-31    TPro  8.2  /  Alb  2.4<L>  /  TBili  4.6<H>  /  DBili  3.0<H>  /  AST  93<H>  /  ALT  58<H>  /  AlkPhos  120  01-31          Urinalysis Basic - ( 31 Jan 2024 05:30 )    Color: x / Appearance: x / SG: x / pH: x  Gluc: 137 mg/dL / Ketone: x  / Bili: x / Urobili: x   Blood: x / Protein: x / Nitrite: x   Leuk Esterase: x / RBC: x / WBC x   Sq Epi: x / Non Sq Epi: x / Bacteria: x              RADIOLOGY & ADDITIONAL STUDIES (The following images were personally reviewed):          A/p: 78M w PMH Crohn's, AFib/Flutter s/p DCCVs on amiodarone, remote ileocectomy and open appendectomy. Admitted (6/23) for SBO vs Crohns flare, s/p NGT decompression and s/p lap converted to open TRE, SBR x 3, left in discontinuity with abthera vac on (6/27), RTOR for ileocolic resection, small bowel anastomosis, and abdominal wall closure on (6/28), c/b fluid collection s/p IR aspiration of perihepatic fluid on (7/3), c/b wound dehiscence s/p RTOR exlap, washout, ileocolic resection with end ileostomy, blow hole colostomy, red rubber from ileostomy to small bowel anastomosis; vicryl bridging mesh on (7/5) transferred to SICU postoperatively for hemodynamic monitoring, with hospital course complicated by periods of severe ELVI and hyponatremia, which resolved but stepped back up for to SICU on (9/10) for acute AMS, intermittent hypoglycemia, AFib with RVR. Percutaneous Cholecystostomy placed on (9/11) for enlarged GB, PCT capped 10/5 and uncapped 10/25 for hyperbilirubinemia, Right brachial DVT, left basillic and cephalic superficial thrombus on duplex 11/2, CT 11/14 with ALDEN ground glass opacity of unclear etiology, completed empiric 7day cefepime course 11/22, and another course of 7day cefepime for PNA  (1/15-23). rising T-bili of unclear etiology, resolved candida glabrata fungemia, ELVI improving.     NEURO: Hx of depression: cont Effexor (halved dose in setting of renal dysfunx). Anxiety: Lorazepam PRN. Holistic consult for anxiety and insomnia. Gabapentin for restless leg syndrome.   HEENT: Timolol eye gtt added on 1/10 for additional systemic BB support in setting of malabsorption  CV: been getting lasix for past few days for volume overload. edema much improved. will hold off on diuresis today. Hx of Afib and recent Aflutter alternating with sinus tachycardia (HR 100s): Cont Lopressor to 10 q6h, added timolol eye ggt for attempted systemic affect. Started therapeutic Lovenox on 1/10, but held since 1/15 in setting of dark red output from ileostomy. s/p previous DCCV, off Amiodarone and Lipitor due to persistent transaminitis. BLE duplex (1/5) no DVT, TTE  (7/18) - PASP 64mmHg, EF 65-70%. holding Losartan & norvasc because not absorbing PO meds, TTE (1/4) LVEF 75%, mild/mod AR, PASP 40, RVEF wnl.  PULM: Atelectasis/dyspnea improved clinically: cont 1 hr of BiPAP every 12hrs and nasal canula or room air. PRN duonebs for wheezing. Encourage OOB and IS. CT from 11/14 with ALDEN ground glass opacity of unclear etiology. COVID negative. RVP negative. Completed 7d empiric cefepime 11/22 to cover for potential PNA and another course of cefepime (1/15-1/23).    GI/FEN: Low res, low fat, high protein diet. Cont Ensure max x1/day however pt likely not absorbing adequately due to proximal fistula. Folate, thiamine. PICC replaced 12/4, switched out PICC on 1/4. 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 Transaminitis slowly improved, elevated T bili, s/p Perc Deb on 9/11, uncapped on 1/15, recap on (1/17). been seen by Hepatology - MRCP 10/30 no obstruction, cont ursodiol, RUQ US 11/25 CBD 5mm, hepatic vessels patent. Cont Vit D weekly.   : Voids. ELVI improved: stopped famotidine given renal dysfunction. MARLO Duplex and RP US unremarkable, CK wnl.    ENDO: Hx hypothyroid: IV Synthroid,  Repeat thyroid studies WNL 1/3/24.   ID: currently not on any abx. completed cefepime for PNA (1/15-23) BCx 11/22 grew candida glabrata, likely CLABSI. s/p Caspo & completed Fluconazole course (12/1-12/9). Ophthalmology evaluated - normal ocular exam. Echo no vegetation. PICC replaced on 12/4. Cont Nystatin powder. // DC: caspofungin (11/24-12/1). empiric 7days cefepime (11/15-11/22), C. tertium, Lactobacillus from IR cx 7/3, and candida albicans, lactobacillus, vanc sensitive E faecium, vanc resistant E gallinarum, vanc resistant E casseliflavus, lactobacillus from OR cx 7/5. Completed course of abx with Imipenem (9/10-9/15). Imipenem (8/26--) imipenem (6/30-7/12, 7/23-7/30), Dapto (6/30-7/5 and 7/23-7/24). Empiric Dapto (8/23-24) and Cefepime (8/23-24).   PPX: SCDs, held therapeutic Lovenox for Afib in setting of dark blood from ileostomy, currently on PPX lovenox given hx of thrombosis.   HEME: Anemia - continue Epogen weekly. S/p Iron Sucrose 200 qd x 3 days for chronic anemia. recurrent drop in Hgb requiring intermittent unit PRBC.  repeat Hgb 8.9, macrocytic anemia,  LINES: PIVs, RUE PICC placed (1/5--) will plan to switch PICC once a month in setting of fungemia history, next switch will be around 2/5 // DC: LUE PICC (9/1-11/1 ), RUE PICC: (11/1-11/24), LUE PICC (12/4-1/5)  WOUNDS/DRAINS: Abdominal wound and fistula with wound manager in place dressing change daily. Had recurrent punctate bleeding at wound bed, s/p placed surgicel, attempt to stitch, electrocautery and epi soaked gauze. Cont epinephrine soaked gauze at bedside as needed for wound bleeding. RLQ Ostomy. IR perc deb tube recapped 1/17 // DC: L Clay drain.  PT: Ambulate as tolerated OOB to chair daily.  DISPO: SICU due to complicated hospital course. but will downgrade labs to every other day   Interval Events:   No events overnight  Patient seen and examined at bedside.      Allergies    penicillin (Angioedema)    Intolerances        Vital Signs Last 24 Hrs  T(C): 36.7 (01 Feb 2024 06:10), Max: 37.1 (31 Jan 2024 21:57)  T(F): 98 (01 Feb 2024 06:10), Max: 98.7 (31 Jan 2024 21:57)  HR: 93 (01 Feb 2024 06:28) (90 - 93)  BP: 147/68 (01 Feb 2024 06:28) (127/60 - 167/81)  BP(mean): 98 (01 Feb 2024 06:28) (87 - 113)  RR: 21 (01 Feb 2024 06:28) (18 - 26)  SpO2: 96% (01 Feb 2024 06:28) (93% - 100%)    Parameters below as of 01 Feb 2024 06:28  Patient On (Oxygen Delivery Method): room air        01-31 @ 07:01 - 02-01 @ 07:00  --------------------------------------------------------  IN: 2788.6 mL / OUT: 2690 mL / NET: 98.6 mL      01-31 @ 07:01  -  02-01 @ 07:00  --------------------------------------------------------  IN: 2788.6 mL / OUT: 2690 mL / NET: 98.6 mL        Physical Exam:     Neurological: AAOx3, CNII-XII intact,  strength 5/5 b/l  ENT: mucus membrane moist  Cardiovascular: RRR  Respiratory: CTA  Gastrointestinal: soft, NT, ND, BS+, no bleeding from ileostomy, fistula darkish red content, no active bleeding  Extremities: warm, dependent edema improved   Vascular: no cyanosis/erythema  Skin: no rashes, generalized jaundice present   MSK: no joint swelling.       LABS:      CBC Full  -  ( 31 Jan 2024 05:30 )  WBC Count : 8.63 K/uL  RBC Count : 2.82 M/uL  Hemoglobin : 8.7 g/dL  Hematocrit : 28.1 %  Platelet Count - Automated : 152 K/uL  Mean Cell Volume : 99.6 fl  Mean Cell Hemoglobin : 30.9 pg  Mean Cell Hemoglobin Concentration : 31.0 gm/dL  Auto Neutrophil # : x  Auto Lymphocyte # : x  Auto Monocyte # : x  Auto Eosinophil # : x  Auto Basophil # : x  Auto Neutrophil % : x  Auto Lymphocyte % : x  Auto Monocyte % : x  Auto Eosinophil % : x  Auto Basophil % : x    01-31    135  |  102  |  46<H>  ----------------------------<  137<H>  4.3   |  25  |  1.20    Ca    8.4      31 Jan 2024 05:30  Phos  3.8     01-31  Mg     2.1     01-31    TPro  8.2  /  Alb  2.4<L>  /  TBili  4.6<H>  /  DBili  3.0<H>  /  AST  93<H>  /  ALT  58<H>  /  AlkPhos  120  01-31          Urinalysis Basic - ( 31 Jan 2024 05:30 )    Color: x / Appearance: x / SG: x / pH: x  Gluc: 137 mg/dL / Ketone: x  / Bili: x / Urobili: x   Blood: x / Protein: x / Nitrite: x   Leuk Esterase: x / RBC: x / WBC x   Sq Epi: x / Non Sq Epi: x / Bacteria: x              RADIOLOGY & ADDITIONAL STUDIES (The following images were personally reviewed):          A/p: 78M w PMH Crohn's, AFib/Flutter s/p DCCVs on amiodarone, remote ileocectomy and open appendectomy. Admitted (6/23) for SBO vs Crohns flare, s/p NGT decompression and s/p lap converted to open TRE, SBR x 3, left in discontinuity with abthera vac on (6/27), RTOR for ileocolic resection, small bowel anastomosis, and abdominal wall closure on (6/28), c/b fluid collection s/p IR aspiration of perihepatic fluid on (7/3), c/b wound dehiscence s/p RTOR exlap, washout, ileocolic resection with end ileostomy, blow hole colostomy, red rubber from ileostomy to small bowel anastomosis; vicryl bridging mesh on (7/5) transferred to SICU postoperatively for hemodynamic monitoring, with hospital course complicated by periods of severe ELVI and hyponatremia, which resolved but stepped back up for to SICU on (9/10) for acute AMS, intermittent hypoglycemia, AFib with RVR. Percutaneous Cholecystostomy placed on (9/11) for enlarged GB, PCT capped 10/5 and uncapped 10/25 for hyperbilirubinemia, Right brachial DVT, left basillic and cephalic superficial thrombus on duplex 11/2, CT 11/14 with ALDEN ground glass opacity of unclear etiology, completed empiric 7day cefepime course 11/22, and another course of 7day cefepime for PNA  (1/15-23). rising T-bili of unclear etiology, resolved candida glabrata fungemia, ELVI improving.     NEURO: Hx of depression: cont Effexor 150 QD.  Anxiety: Lorazepam PRN. Holistic consult for anxiety and insomnia. Gabapentin for restless leg syndrome.   HEENT: Timolol eye gtt added on 1/10 for additional systemic BB support in setting of malabsorption  CV:Fluid overload resolved.  Hx of Afib and recent Aflutter alternating with sinus tachycardia (HR 100s): Changed to po  Lopressor 50 q12. Cont timolol eye ggt for attempted systemic affect. Cont Lovenox. S/p previous DCCV, off Amiodarone and Lipitor due to persistent transaminitis. BLE duplex (1/5) no DVT, TTE  (7/18) - PASP 64mmHg, EF 65-70%. Holding Losartan & norvasc, TTE (1/4) LVEF 75%, mild/mod AR, PASP 40, RVEF wnl.  PULM: Atelectasis/dyspnea improved clinically: cont room air, will dc Bipap. PRN duonebs for wheezing. Encourage OOB and IS.   GI/FEN: Low res, low fat, high protein diet. Cont Ensure max x1/day however pt likely not absorbing adequately due to proximal fistula. Folate, thiamine. 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 Continue TPN/lipidscont Octreotide & Cholestyramine 10/18. Transaminitis slowly improved, elevated T bili, s/p Perc Deb on 9/11, uncapped on 1/15, recap on (1/17). been seen by Hepatology - MRCP 10/30 no obstruction, cont ursodiol, RUQ US 11/25 CBD 5mm, hepatic vessels patent. Cont Vit D weekly.   : Voids. ELVI improved: stopped famotidine given renal dysfunction. MARLO Duplex and RP US unremarkable, CK wnl.    ENDO: Hx hypothyroid: IV Synthroid,  Repeat thyroid studies WNL 1/3/24.   ID: currently not on any abx. Completed cefepime for PNA (1/15-23) BCx 11/22 grew candida glabrata, likely CLABSI. s/p Caspo & completed Fluconazole course (12/1-12/9). Ophthalmology evaluated - normal ocular exam. Echo no vegetation. PICC replaced on 12/4. Cont Nystatin powder. // DC: caspofungin (11/24-12/1). empiric 7days cefepime (11/15-11/22), C. tertium, Lactobacillus from IR cx 7/3, and candida albicans, lactobacillus, vanc sensitive E faecium, vanc resistant E gallinarum, vanc resistant E casseliflavus, lactobacillus from OR cx 7/5. Completed course of abx with Imipenem (9/10-9/15). Imipenem (8/26--) imipenem (6/30-7/12, 7/23-7/30), Dapto (6/30-7/5 and 7/23-7/24). Empiric Dapto (8/23-24) and Cefepime (8/23-24).   PPX: SCDs, currently on PPX lovenox given hx of thrombosis.   HEME: Anemia - continue Epogen weekly. S/p Iron Sucrose 200 qd x 3 days for chronic anemia. Recurrent drop in Hgb requiring intermittent unit PRBC. Repeat Hgb 8.9, macrocytic anemia,  LINES: PIVs, RUE PICC placed (1/5--) will plan to switch PICC once a month in setting of fungemia history, next switch will be around 2/5 // DC: LUE PICC (9/1-11/1 ), RUE PICC: (11/1-11/24), LUE PICC (12/4-1/5)  WOUNDS/DRAINS: Abdominal wound and fistula with wound manager in place dressing change daily. Had recurrent punctate bleeding at wound bed, s/p placed surgicel, attempt to stitch, electrocautery and epi soaked gauze. Cont epinephrine soaked gauze at bedside as needed for wound bleeding. RLQ Ostomy. IR perc deb tube recapped 1/17 // DC: L Clay drain.  PT: Ambulate as tolerated OOB to chair daily.  DISPO: SICU due to complicated hospital course. Cont Labs every other day    Interval Events:   No events overnight  Patient seen and examined at bedside.      Allergies    penicillin (Angioedema)    Intolerances        Vital Signs Last 24 Hrs  T(C): 36.7 (01 Feb 2024 06:10), Max: 37.1 (31 Jan 2024 21:57)  T(F): 98 (01 Feb 2024 06:10), Max: 98.7 (31 Jan 2024 21:57)  HR: 93 (01 Feb 2024 06:28) (90 - 93)  BP: 147/68 (01 Feb 2024 06:28) (127/60 - 167/81)  BP(mean): 98 (01 Feb 2024 06:28) (87 - 113)  RR: 21 (01 Feb 2024 06:28) (18 - 26)  SpO2: 96% (01 Feb 2024 06:28) (93% - 100%)    Parameters below as of 01 Feb 2024 06:28  Patient On (Oxygen Delivery Method): room air        01-31 @ 07:01 - 02-01 @ 07:00  --------------------------------------------------------  IN: 2788.6 mL / OUT: 2690 mL / NET: 98.6 mL      01-31 @ 07:01  -  02-01 @ 07:00  --------------------------------------------------------  IN: 2788.6 mL / OUT: 2690 mL / NET: 98.6 mL        Physical Exam:     Neurological: AAOx3, CNII-XII intact,  strength 5/5 b/l  ENT: mucus membrane moist  Cardiovascular: RRR  Respiratory: CTA  Gastrointestinal: soft, NT, ND, BS+, no bleeding from ileostomy, fistula darkish red content, no active bleeding  Extremities: warm, dependent edema improved   Vascular: no cyanosis/erythema  Skin: no rashes, generalized jaundice present   MSK: no joint swelling.       LABS:      CBC Full  -  ( 31 Jan 2024 05:30 )  WBC Count : 8.63 K/uL  RBC Count : 2.82 M/uL  Hemoglobin : 8.7 g/dL  Hematocrit : 28.1 %  Platelet Count - Automated : 152 K/uL  Mean Cell Volume : 99.6 fl  Mean Cell Hemoglobin : 30.9 pg  Mean Cell Hemoglobin Concentration : 31.0 gm/dL  Auto Neutrophil # : x  Auto Lymphocyte # : x  Auto Monocyte # : x  Auto Eosinophil # : x  Auto Basophil # : x  Auto Neutrophil % : x  Auto Lymphocyte % : x  Auto Monocyte % : x  Auto Eosinophil % : x  Auto Basophil % : x    01-31    135  |  102  |  46<H>  ----------------------------<  137<H>  4.3   |  25  |  1.20    Ca    8.4      31 Jan 2024 05:30  Phos  3.8     01-31  Mg     2.1     01-31    TPro  8.2  /  Alb  2.4<L>  /  TBili  4.6<H>  /  DBili  3.0<H>  /  AST  93<H>  /  ALT  58<H>  /  AlkPhos  120  01-31          Urinalysis Basic - ( 31 Jan 2024 05:30 )    Color: x / Appearance: x / SG: x / pH: x  Gluc: 137 mg/dL / Ketone: x  / Bili: x / Urobili: x   Blood: x / Protein: x / Nitrite: x   Leuk Esterase: x / RBC: x / WBC x   Sq Epi: x / Non Sq Epi: x / Bacteria: x              RADIOLOGY & ADDITIONAL STUDIES (The following images were personally reviewed):          A/p: 78M w PMH Crohn's, AFib/Flutter s/p DCCVs on amiodarone, remote ileocectomy and open appendectomy. Admitted (6/23) for SBO vs Crohns flare, s/p NGT decompression and s/p lap converted to open TRE, SBR x 3, left in discontinuity with abthera vac on (6/27), RTOR for ileocolic resection, small bowel anastomosis, and abdominal wall closure on (6/28), c/b fluid collection s/p IR aspiration of perihepatic fluid on (7/3), c/b wound dehiscence s/p RTOR exlap, washout, ileocolic resection with end ileostomy, blow hole colostomy, red rubber from ileostomy to small bowel anastomosis; vicryl bridging mesh on (7/5) transferred to SICU postoperatively for hemodynamic monitoring, with hospital course complicated by periods of severe ELVI and hyponatremia, which resolved but stepped back up for to SICU on (9/10) for acute AMS, intermittent hypoglycemia, AFib with RVR. Percutaneous Cholecystostomy placed on (9/11) for enlarged GB, PCT capped 10/5 and uncapped 10/25 for hyperbilirubinemia, Right brachial DVT, left basillic and cephalic superficial thrombus on duplex 11/2, CT 11/14 with ALDEN ground glass opacity of unclear etiology, completed empiric 7day cefepime course 11/22, and another course of 7day cefepime for PNA  (1/15-23). rising T-bili of unclear etiology, resolved candida glabrata fungemia, ELVI improving.     NEURO: Hx of depression: cont Effexor 150 QD.  Anxiety: Lorazepam PRN. Holistic consult for anxiety and insomnia. Gabapentin for restless leg syndrome.   HEENT: Timolol eye gtt added on 1/10 for additional systemic BB support in setting of malabsorption  CV:Fluid overload resolved.  Hx of Afib and recent Aflutter alternating with sinus tachycardia (HR 100s): Changed to po  Lopressor 50 q12. Cont timolol eye ggt for attempted systemic affect. Cont Lovenox. S/p previous DCCV, off Amiodarone and Lipitor due to persistent transaminitis. BLE duplex (1/5) no DVT, TTE  (7/18) - PASP 64mmHg, EF 65-70%. Holding Losartan & norvasc, TTE (1/4) LVEF 75%, mild/mod AR, PASP 40, RVEF wnl.  PULM: Atelectasis/dyspnea improved clinically: cont room air, will dc Bipap. PRN duonebs for wheezing. Encourage OOB and IS.   GI/FEN: Low res, low fat, high protein diet. Cont Ensure max x1/day however pt likely not absorbing adequately due to proximal fistula. Folate, thiamine. 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 Continue TPN/lipids cont Octreotide & Cholestyramine 10/18. Transaminitis slowly improved, elevated T bili, s/p Perc Deb on 9/11, uncapped on 1/15, recap on (1/17). been seen by Hepatology - MRCP 10/30 no obstruction, cont ursodiol, RUQ US 11/25 CBD 5mm, hepatic vessels patent. Cont Vit D weekly.   : Voids. ELVI improved: stopped famotidine given renal dysfunction. MARLO Duplex and RP US unremarkable, CK wnl.    ENDO: Hx hypothyroid: Cont Synthroid,  Repeat thyroid studies WNL 1/3/24.   ID: currently not on any abx. Completed cefepime for PNA (1/15-23) BCx 11/22 grew candida glabrata, likely CLABSI. s/p Caspo & completed Fluconazole course (12/1-12/9). Ophthalmology evaluated - normal ocular exam. Echo no vegetation. PICC replaced on 12/4. Cont Nystatin powder. // DC: caspofungin (11/24-12/1). empiric 7days cefepime (11/15-11/22), C. tertium, Lactobacillus from IR cx 7/3, and candida albicans, lactobacillus, vanc sensitive E faecium, vanc resistant E gallinarum, vanc resistant E casseliflavus, lactobacillus from OR cx 7/5. Completed course of abx with Imipenem (9/10-9/15). Imipenem (8/26--) imipenem (6/30-7/12, 7/23-7/30), Dapto (6/30-7/5 and 7/23-7/24). Empiric Dapto (8/23-24) and Cefepime (8/23-24).   PPX: SCDs, currently on PPX lovenox given hx of thrombosis.   HEME: Anemia - continue Epogen weekly. Previously treated with Iron and intermittent transfusions.   LINES: PIVs, RUE PICC placed (1/5--) will plan to switch PICC once a month in setting of fungemia history, next switch will be around 2/5 // DC: LUE PICC (9/1-11/1 ), RUE PICC: (11/1-11/24), LUE PICC (12/4-1/5)  WOUNDS/DRAINS: Abdominal wound and fistula with wound manager in place dressing change daily. Had recurrent punctate bleeding at wound bed, s/p placed surgicel, attempt to stitch, electrocautery and epi soaked gauze. Cont epinephrine soaked gauze at bedside as needed for wound bleeding. RLQ Ostomy. IR perc deb tube recapped 1/17 // DC: L Clay drain.  PT: Ambulate as tolerated OOB to chair daily.  DISPO: SICU due to complicated hospital course. Cont Labs every other day

## 2024-02-01 NOTE — CHART NOTE - NSCHARTNOTEFT_GEN_A_CORE
Admitting Diagnosis:   Patient is a 78y old  Male who presents with a chief complaint of SBO (2024 07:24)      PAST MEDICAL & SURGICAL HISTORY:  Essential hypertension  Hypertension      Atrial fibrillation  s/p cardioversion  and   Pt. reports 4 DCCV  Now on Amiodarone 200mg PO bid and Eliquis 5mg PO bid  Last DCCV 4 yrs ago at [facility]      Crohn's disease  s/p partial resection of ileum      Hyperlipidemia      Hypothyroidism      History of depression  On Venlafaxine ER 150mg PO bid      Junctional rhythm      Bradycardia      H/O knee surgery      History of cataract surgery          Current Nutrition Order: TPN via PICC- 2L bag running over 14hrs [@153ml/hr], providing 380g Dex, 120g AA, 50g SMOF lipids daily; provides 2272 kcals, 120g protein daily, GIR 4.71 mg/kg/min   PO- Regular diet + 2 LPS per day [provide 100 kcals, 15g protein each], + Ensure Max daily [150 kcals, 20g protein]    PO Intake: Good (%) [   ]  Fair (50-75%) [ X ] Poor (<25%) [   ]    GI Issues:   24: ileostomy output 5cc x 24hrs, abdominal wound/fistula output 1175cc x 24hrs, per cony output 0cc x 24hrs [capped , uncapped 1/15, recapped ]  24: ileostomy output 25cc x 24hrs, abdominal wound/fistula output 1925cc x 24hrs, per cony output 0cc x 24hrs [capped , uncapped 1/15, recapped ]  24: ileostomy output 25cc x 24hrs, abdominal wound/fistula output 1700cc x 24hrs, per cony output 0cc x 24hrs [capped , uncapped 1/15, recapped ]  24: ileostomy output 60cc x 24hrs, abdominal wound/fistula output 1375cc x 24hrs, per cony output 1025cc x 24hrs [capped , uncapped 1/15]  24: ileostomy output 0cc x 24hrs, abdominal wound/fistula output 1750cc x 24hrs, per cony output 0cc x 24hrs [capped 1/2]  24: noted brownish output from [id number], total amount not documented; abdominal wound/fistula output 2285cc x 24hrs, per cony output 0cc x 24hrs [capped /]  1/3/24:  ileostomy output 0cc x 24hrs, abdominal wound/fistula output 2550cc x 24hrs, per cony output 275cc x 24hrs  : ileostomy output 50cc x 24hrs, abdominal wound/fistula output 910cc x 24hrs, per cony output 1225cc x 24hrs  : ileostomy output 50cc x 24hrs, abdominal wound/fistula output 1175cc x 24hrs, per cony output 1050cc x 24hrs  : ileostomy output 50cc x 24hrs, abdominal wound/fistula output 1100cc x 24hrs, per cony output 1050cc x 24hrs     : ileostomy output 20cc x 24hrs, abdominal wound/fistula output 1275cc x 24hrs, per cony output 775cc x 24hrs    : ileostomy output 15cc x 24hrs, abdominal wound/fistula output  1230cc x 24hrs, per cony output 1010cc x 24hrs    : ileostomy output 75cc x 24hrs, abdominal wound/fistula output  600cc x 24hrs, per cony output 1125cc x 24hrs    : ileostomy output 35cc x 24hrs, abdominal wound/fistula output  250cc x 24hrs, per cony output 745cc x 24hrs    : ileostomy output 30cc x 24hrs, abdominal wound/fistula output  2400cc x 24hrs, per cony output 550cc x 24hrs   : ileostomy output 50 cc x 24hrs, abdominal wound/fistula output 2175 cc x 24hrs, per cony output 530 cc x 24hrs   : ileostomy output 25cc x 24hrs, abdominal wound/fistula output 2350 cc x 24hrs, per cony output 775cc x 24hrs     Pain: no pain/discomfort noted    Skin Integrity: generalized jaundice, abd wound and fistula with wound manager in place, RLQ ileostomy,  perc cony drain [now capped]. Rudy score 19    I&Os:   24 @ 07:01  -  24 @ 07:00  --------------------------------------------------------  IN: 2788.6 mL / OUT: 2690 mL / NET: 98.6 mL    24 @ 07:01  -  24 @ 13:09  --------------------------------------------------------  IN: 160 mL / OUT: 450 mL / NET: -290 mL        Labs:       135  |  102  |  46<H>  ----------------------------<  137<H>  4.3   |  25  |  1.20    Ca    8.4      2024 05:30  Phos  3.8       Mg     2.1         TPro  8.2  /  Alb  2.4<L>  /  TBili  4.6<H>  /  DBili  3.0<H>  /  AST  93<H>  /  ALT  58<H>  /  AlkPhos  120      CAPILLARY BLOOD GLUCOSE          Medications:  MEDICATIONS  (STANDING):  chlorhexidine 2% Cloths 1 Application(s) Topical <User Schedule>  cholestyramine Powder (Sugar-Free) 4 Gram(s) Oral daily  enoxaparin Injectable 40 milliGRAM(s) SubCutaneous every 24 hours  epoetin matt-epbx (RETACRIT) Injectable 74979 Unit(s) SubCutaneous every 7 days  ergocalciferol 79424 Unit(s) Oral <User Schedule>  gabapentin 100 milliGRAM(s) Oral at bedtime  glucagon  Injectable 1 milliGRAM(s) IntraMuscular once  levothyroxine 37.5 MICROGram(s) Oral daily  lipid, fat emulsion (Fish Oil and Plant Based) 20% Infusion 0.54 Gm/kG/Day (20.83 mL/Hr) IV Continuous <Continuous>  LORazepam     Tablet 0.5 milliGRAM(s) Oral at bedtime  metoprolol tartrate 50 milliGRAM(s) Oral every 12 hours  Parenteral Nutrition - Adult 1 Each TPN Continuous <Continuous>  Parenteral Nutrition - Adult 1 Each TPN Continuous <Continuous>  teduglutide Injectable 4.7 milliGRAM(s) SubCutaneous every 24 hours  timolol 0.5% Solution 1 Drop(s) Both EYES daily  ursodiol Suspension 300 milliGRAM(s) Oral every 8 hours  venlafaxine XR. 150 milliGRAM(s) Oral daily    MEDICATIONS  (PRN):  chlorhexidine 0.12% Liquid 15 milliLiter(s) Swish and Spit two times a day PRN oral hygeine  EPINEPHrine   1 mG/mL (1:1,000) Topical Solution 1 Application(s) Topical every 24 hours PRN for wound bleeding  LORazepam     Tablet 0.25 milliGRAM(s) Oral every 12 hours PRN Anxiety  melatonin 5 milliGRAM(s) Oral at bedtime PRN Insomnia      Daily Weight in kkg (2024 06:28)  Daily 99.1kg [2024]   Daily 94kg [2024]  Daily 93.3kg [19 Dec 2023]  Daily 93.3kg [11 Dec 2023]  Daily 93.3kg [08 Dec 2023]  Daily 93.3kg [6 Dec 2023]  Daily 94.2kg [2023]  Daily 94.4kg [2023]  Daily 94.2kg [2023]  Daily 94.2kg [2023]  Daily 94.2kg [2023]  Daily 94.2 [15 Nov 2023]  Daily 94.2kg [2023]  Daily 95.8kg [2023]  Daily 94.2kg [2023]  Daily 85.2kg [2023]  Daily 85.2kg [31 Oct 2023]  Daily 85.2kg [24 Oct 2023]  Daily 85.2kg [20 Oct 2023]  Daily 81.9kg [18 Oct 2023]  Daily 85.2kg [16 Oct 2023]  Daily   95.2kg [12 Oct 2023]   Daily 87.7kg [11 Oct 2023]  Daily 87.4kg [09 Oct 2023]  Daily 86.4kg [07 Oct 2023]  Daily 82.5kg [06 Oct 2023]  Daily 82.6kg [4 Oct 2023]   Daily 83.5kg [03 Oct 2023]  Daily 84.5kg [2 Oct 2023]  Daily 87.2kg [1 Oct 2023]  Daily 88.3kg [29 Sep 2023]  Daily 89.9kg [28 Sep 2023]  Daily 87.7kg [24 Sep 2023]  Daily 90.4kg [21 Sep 2023]  Daily 91.4kg [20 Sep 2023]  Daily 86.7kg [18 Sep 2023]  Daily 88.1kg [16 Sep 2023]  Daily 88.9kg [15 Sep 2023]   Daily 89.1 [14 Sep 2023]  Daily 92.9kg [6 Sep 2023]  Daily 91.8kg [27 Aug 2023]  Daily 101kg [9 Aug 2023]     Weight Change:  weight previously trending down, then up- now appears to be stable x ~2 months, recent weight gain likely secondary to increase in fluid provision/edema. Recommend continue weekly/daily weights for trending & ensuring adequacy of nutrition provision    IBW: 86.4kg  % IBW: 111.1%    Estimated energy needs:   Ideal body weight (86.4kg) used for calculations as pt >100% IBW and BMI <30 per [facility] Standards of Care. Needs estimated for age and adjusted for current clinical status, increased needs for post-op & open abd wound healing    Calories: 2387-9919 kcals based on 25-35 kcals/kg  Protein: 129.6-172.8 g protein based on 1.5-2.0g protein/kg + additional depending on outputs  *Fluid needs per team    Subjective:   78M w PMH Crohn's, AFib/Flutter s/p DCCVs on amiodarone, remote ileocectomy and open appendectomy. Admitted () for SBO vs Crohns flare, s/p NGT decompression and s/p lap converted to open TRE, SBR x 3, left in discontinuity with abthera vac on (), RTOR for ileocolic resection, small bowel anastomosis, and abdominal wall closure on (), c/b fluid collection s/p IR aspiration of perihepatic fluid on (7/3), c/b wound dehiscence s/p RTOR exlap, washout, ileocolic resection with end ileostomy, blow hole colostomy, red rubber from ileostomy to small bowel anastomosis; vicryl bridging mesh on () transferred to SICU postoperatively for hemodynamic monitoring, with hospital course complicated by periods of severe ELVI and hyponatremia, which resolved but stepped back up for to SICU on (9/10) for acute AMS, intermittent hypoglycemia, AFib with RVR. Percutaneous Cholecystostomy placed on () for enlarged GB, PCT capped 10/5 and uncapped 10/25 for hyperbilirubinemia, Right brachial DVT, left basillic and cephalic superficial thrombus on duplex , CT  with ALDEN ground glass opacity of unclear etiology, completed empiric 7day cefepime course , and another course of 7day cefepime for PNA  (1/15-23). rising T-bili of unclear etiology, resolved candida glabrata fungemia, ELVI improving.     Chart reviewed. Discussed with IDT today during AM rounds. Rx and labs reviewed. Pt sleeping at time of assessment, on room air. 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 Gattex initiated , plan to monitor weights, outputs, wound healing, labs for absorption & adjust TPN provision prn to prevent over/underfeeding. PO intake noted as fair, eating ~50% at meals. On diuresis for volume overload, edema improving [holding today]. Labs reviewed, micronutrients : selenium 80 <L>, copper 84 WNL [previously low], zinc 92 WNL [previously high]. Vitamin D 7.9 <L> []-- ongoing supplementation. , Na 135 borderline low, BUN 46 <H>, Creat WNL [monitor hydration / renal function], total bilirubin 4.6 <H>, direct bilirubin 3.0 <H>, AST 93 <H>, ALT 58 <H>, indirect bili 1.6 <H>. Meds- gattex daily, synthroid [administer 30-60 minutes before food; separate at least 4hrs from calcium or iron-containing products or bile acid sequestrants], 50,000 units vitamin D weekly, EPO, ursodiol, cholestyramine. RDN will continue to monitor, reassess, and intervene as appropriate.     Previous Nutrition Diagnosis:  1. Increased Nutrient Needs R/T physiological demands for nutrient AEB post-op wound healing  Active [ X ]  Resolved [   ]    2. Altered GI function R/T extensive GI history, insufficient bowel, high output fistula AEB supplemental PN requirement to meet nutrition needs  Active [ X  ]  Resolved [   ]    If resolved, new PES:     Goal:  Pt will meet at least 75% of protein & energy needs via most appropriate route for nutrition     Recommendations:  1. Recommend changing TPN provision to provide ~80% estimated kcal/protein needs  - recommend 250g Dex, 112g protein, 50g SMOF lipids; provides 1798 kcals, 112g protein, GIR 3.10 mg/kg/min [20.8 kcals/kg, 15.6 non-protein kcals/kg, 1.3g protein/kg IBW]  - continue running over 14hrs  - fluids & lytes per team  - MVI, thiamine, vit C in bag, increase selenium as able  - c/w zinc 3x per week, copper & selenium in bag-- continue checking monthly  2. PO diet per team discretion  -  c/w Regular diet as medically feasible to allow for more menu options  - c/w 1 LPS BID [200 kcals, 30g protein] + 1 Ensure Max daily [150 kcals, 30g protein] as amenable  - consider NPO for bowel rest as indicated   - ongoing education prn  - close monitoring of weights, fistula outputs, labs, wound healing, urine outputs & consider UUN & fecal fat studies to monitor changes in PO absorption  3. Close monitoring of weights, outputs, labs, adjust TPN as indicated to prevent over/underfeeding  4. Weekly lipid panel while on TPN  - hold/decrease lipids if TG >400  - continue to trend LFTs  5. c/w Vit D supplementation, recheck levels in 4 weeks  6. Recommend weekly weights for trending/ensuring adequacy of nutrition provision  7. Hydration per team  - risk for dehydration with high outputs, monitor  - additional fluids per team  - consider oral rehydration solution  8. Monitor BMP/Mg/Phos, POC BG while on TPN  - monitor & replenish lytes outside TPN bag prn  9. Continue close monitoring of clinical course & adjust recommendations prn    Education: ongoing diet education prn    Risk Level: High [  X ] Moderate [   ] Low [   ]

## 2024-02-01 NOTE — ADVANCED PRACTICE NURSE CONSULT - ASSESSMENT
New Coloplast Dustin's wound/fistula manager applied, smaller model used.  Effluent thick, wound bed beefy red, bleeds easily. Periwound protected with Cavilon barrier wipes, stoma rings, and stoma paste prior to placing appliance. New Coloplast 1 piece convex appliance placed over ileostomy, which also has thick effluent.

## 2024-02-01 NOTE — PROGRESS NOTE ADULT - SUBJECTIVE AND OBJECTIVE BOX
SUBJECTIVE: Pt seen and examined at bedside this am by surgery team. Patient is lying comfortably in bed with no complaints; feels breathing is good. Tolerating diet. Patient denies fever, nausea, vomiting, chest pain, and shortness of breath    MEDICATIONS  (STANDING):  chlorhexidine 2% Cloths 1 Application(s) Topical <User Schedule>  cholestyramine Powder (Sugar-Free) 4 Gram(s) Oral daily  enoxaparin Injectable 40 milliGRAM(s) SubCutaneous every 24 hours  epoetin matt-epbx (RETACRIT) Injectable 13599 Unit(s) SubCutaneous every 7 days  ergocalciferol 50738 Unit(s) Oral <User Schedule>  gabapentin 100 milliGRAM(s) Oral at bedtime  glucagon  Injectable 1 milliGRAM(s) IntraMuscular once  levothyroxine Injectable 30 MICROGram(s) IV Push at bedtime  lipid, fat emulsion (Fish Oil and Plant Based) 20% Infusion 0.54 Gm/kG/Day (20.83 mL/Hr) IV Continuous <Continuous>  LORazepam   Injectable 0.5 milliGRAM(s) IV Push every 24 hours  metoprolol tartrate Injectable 10 milliGRAM(s) IV Push every 6 hours  Parenteral Nutrition - Adult 1 Each TPN Continuous <Continuous>  teduglutide Injectable 4.7 milliGRAM(s) SubCutaneous every 24 hours  timolol 0.5% Solution 1 Drop(s) Both EYES daily  ursodiol Suspension 300 milliGRAM(s) Oral every 8 hours  venlafaxine XR. 150 milliGRAM(s) Oral daily    MEDICATIONS  (PRN):  chlorhexidine 0.12% Liquid 15 milliLiter(s) Swish and Spit two times a day PRN oral hygeine  EPINEPHrine   1 mG/mL (1:1,000) Topical Solution 1 Application(s) Topical every 24 hours PRN for wound bleeding  melatonin 5 milliGRAM(s) Oral at bedtime PRN Insomnia  ondansetron Injectable 4 milliGRAM(s) IV Push every 6 hours PRN Nausea and/or Vomiting      Vital Signs Last 24 Hrs  T(C): 36.7 (01 Feb 2024 06:10), Max: 37.1 (31 Jan 2024 21:57)  T(F): 98 (01 Feb 2024 06:10), Max: 98.7 (31 Jan 2024 21:57)  HR: 93 (01 Feb 2024 06:28) (90 - 93)  BP: 147/68 (01 Feb 2024 06:28) (127/60 - 167/81)  BP(mean): 98 (01 Feb 2024 06:28) (87 - 113)  RR: 21 (01 Feb 2024 06:28) (18 - 26)  SpO2: 96% (01 Feb 2024 06:28) (93% - 100%)    Parameters below as of 01 Feb 2024 06:28  Patient On (Oxygen Delivery Method): room air      Physical Exam  General: Patient is doing well and sitting in bed comfortably  Constitutional: alert and oriented   Pulm: no respiratory distress;  CV: Regular rate  Abd: soft, nontender, nondistended. No rebound, no guarding. Wound manager in place, no bleeding, no leaks. soft semi-formed output; Perc cony capped; Ileostomy with minimal output  Extremities: warm, well perfused, mild peripheral edema      I&O's Detail    31 Jan 2024 07:01  -  01 Feb 2024 07:00  --------------------------------------------------------  IN:    Fat Emulsion (Fish Oil &amp; Plant Based) 20% Infusion: 249.6 mL    Oral Fluid: 237 mL    TPN (Total Parenteral Nutrition): 2302 mL  Total IN: 2788.6 mL    OUT:    Drain (mL): 1175 mL    Drain (mL): 5 mL    Voided (mL): 1510 mL  Total OUT: 2690 mL    Total NET: 98.6 mL        LABS:                        8.7    8.63  )-----------( 152      ( 31 Jan 2024 05:30 )             28.1     01-31    135  |  102  |  46<H>  ----------------------------<  137<H>  4.3   |  25  |  1.20    Ca    8.4      31 Jan 2024 05:30  Phos  3.8     01-31  Mg     2.1     01-31    TPro  8.2  /  Alb  2.4<L>  /  TBili  4.6<H>  /  DBili  3.0<H>  /  AST  93<H>  /  ALT  58<H>  /  AlkPhos  120  01-31      Urinalysis Basic - ( 31 Jan 2024 05:30 )    Color: x / Appearance: x / SG: x / pH: x  Gluc: 137 mg/dL / Ketone: x  / Bili: x / Urobili: x   Blood: x / Protein: x / Nitrite: x   Leuk Esterase: x / RBC: x / WBC x   Sq Epi: x / Non Sq Epi: x / Bacteria: x

## 2024-02-02 NOTE — PROCEDURE NOTE - A LINES
Right Upper Lobe
Right Upper Lobe
Right Upper Lobe/Right Middle Lobe/Right Lower Lobe/Left Upper Lobe/Left Lower Lobe

## 2024-02-02 NOTE — PROCEDURE NOTE - NSUSBLINES_ED_ALL
Right Middle Lobe/Right Lower Lobe/Left Upper Lobe/Left Lower Lobe
Right Lower Lobe/Left Upper Lobe/Left Lower Lobe

## 2024-02-02 NOTE — ADVANCED PRACTICE NURSE CONSULT - ASSESSMENT
Pt's spouse at bedside for education on fistula management after discharge. Appliance changed yesterday instead of today due to leak. Pt's spouse observed current appliance and a demonstration was done on emptying appliance to remove effluent as well as gas. Effluent thick, dark brown in appliance. Provided education on being careful when cleaning wound bed due to fact that wound bleeds very easily. Education will be provided on Monday on how to use silver nitrate sticks in case of bleeding in wound bed. Spouse observed ostomy appliance over ileostomy, will also be changed on Monday. Spouse to take a video of appliance change on Monday.

## 2024-02-02 NOTE — PROGRESS NOTE ADULT - SUBJECTIVE AND OBJECTIVE BOX
SUBJECTIVE: Pt seen and examined at bedside this am by surgery team. Patient is lying comfortably in bed with no complaints. Tolerating diet, pain well controlled with current regimen. Patient denies fever, nausea, vomiting, chest pain, and shortness of breath. Feels relief/effect of PO medications. No further BiPAP.     MEDICATIONS  (STANDING):  chlorhexidine 2% Cloths 1 Application(s) Topical <User Schedule>  cholestyramine Powder (Sugar-Free) 4 Gram(s) Oral daily  enoxaparin Injectable 40 milliGRAM(s) SubCutaneous every 24 hours  epoetin matt-epbx (RETACRIT) Injectable 38348 Unit(s) SubCutaneous every 7 days  ergocalciferol 57575 Unit(s) Oral <User Schedule>  gabapentin 100 milliGRAM(s) Oral at bedtime  glucagon  Injectable 1 milliGRAM(s) IntraMuscular once  levothyroxine 37.5 MICROGram(s) Oral daily  lipid, fat emulsion (Fish Oil and Plant Based) 20% Infusion 0.54 Gm/kG/Day (20.83 mL/Hr) IV Continuous <Continuous>  LORazepam     Tablet 1 milliGRAM(s) Oral at bedtime  metoprolol tartrate 50 milliGRAM(s) Oral every 12 hours  Parenteral Nutrition - Adult 1 Each TPN Continuous <Continuous>  Parenteral Nutrition - Adult 1 Each TPN Continuous <Continuous>  teduglutide Injectable 4.7 milliGRAM(s) SubCutaneous every 24 hours  timolol 0.5% Solution 1 Drop(s) Both EYES daily  ursodiol Suspension 300 milliGRAM(s) Oral every 8 hours  venlafaxine XR. 225 milliGRAM(s) Oral daily    MEDICATIONS  (PRN):  chlorhexidine 0.12% Liquid 15 milliLiter(s) Swish and Spit two times a day PRN oral hygeine  EPINEPHrine   1 mG/mL (1:1,000) Topical Solution 1 Application(s) Topical every 24 hours PRN for wound bleeding  LORazepam     Tablet 0.25 milliGRAM(s) Oral every 12 hours PRN Anxiety  melatonin 5 milliGRAM(s) Oral at bedtime PRN Insomnia  silver nitrate Applicator 1 Application(s) Topical once PRN wound edge bleeding      Vital Signs Last 24 Hrs  T(C): 36.6 (02 Feb 2024 08:32), Max: 36.8 (01 Feb 2024 22:21)  T(F): 97.9 (02 Feb 2024 08:32), Max: 98.3 (01 Feb 2024 22:21)  HR: 92 (02 Feb 2024 10:00) (90 - 93)  BP: 142/70 (02 Feb 2024 10:00) (125/60 - 170/73)  BP(mean): 101 (02 Feb 2024 10:00) (86 - 108)  RR: 18 (02 Feb 2024 10:00) (18 - 23)  SpO2: 96% (02 Feb 2024 10:00) (94% - 98%)    Parameters below as of 02 Feb 2024 10:00  Patient On (Oxygen Delivery Method): room air    Physical Exam  General: Patient is doing well and sitting in bed comfortably  Constitutional: alert and oriented   Pulm: no respiratory distress;  CV: Regular rate  Abd: soft, nontender, nondistended. No rebound, no guarding. Wound manager in place, no bleeding, no leaks. soft semi-formed output; Perc cony capped; Ileostomy with minimal output  Extremities: warm, well perfused, mild peripheral edema      I&O's Detail    01 Feb 2024 07:01  -  02 Feb 2024 07:00  --------------------------------------------------------  IN:    Fat Emulsion (Fish Oil &amp; Plant Based) 20% Infusion: 228.9 mL    TPN (Total Parenteral Nutrition): 2229 mL  Total IN: 2457.9 mL    OUT:    Drain (mL): 690 mL    Drain (mL): 0 mL    Voided (mL): 1630 mL  Total OUT: 2320 mL    Total NET: 137.9 mL      02 Feb 2024 07:01  -  02 Feb 2024 11:55  --------------------------------------------------------  IN:    TPN (Total Parenteral Nutrition): 386 mL  Total IN: 386 mL    OUT:    Voided (mL): 350 mL  Total OUT: 350 mL    Total NET: 36 mL        LABS:                        8.8    8.14  )-----------( 161      ( 02 Feb 2024 05:23 )             26.9     02-02    133<L>  |  101  |  38<H>  ----------------------------<  143<H>  4.0   |  25  |  1.16    Ca    8.2<L>      02 Feb 2024 05:23  Phos  3.6     02-02  Mg     2.0     02-02    TPro  8.2  /  Alb  2.3<L>  /  TBili  4.6<H>  /  DBili  3.0<H>  /  AST  104<H>  /  ALT  61<H>  /  AlkPhos  123<H>  02-02      Urinalysis Basic - ( 02 Feb 2024 05:23 )    Color: x / Appearance: x / SG: x / pH: x  Gluc: 143 mg/dL / Ketone: x  / Bili: x / Urobili: x   Blood: x / Protein: x / Nitrite: x   Leuk Esterase: x / RBC: x / WBC x   Sq Epi: x / Non Sq Epi: x / Bacteria: x

## 2024-02-02 NOTE — PROGRESS NOTE ADULT - ASSESSMENT
78M w PMH Crohn's, AFib/Flutter s/p DCCVs on amiodarone, remote ileocectomy and open appendectomy. Admitted (6/23) for SBO vs Crohns flare, s/p NGT decompression and s/p lap converted to open TRE, SBR x 3, left in discontinuity with abthera vac on (6/27), RTOR for ileocolic resection, small bowel anastomosis, and abdominal wall closure on (6/28), c/b fluid collection s/p IR aspiration of perihepatic fluid on (7/3), c/b wound dehiscence s/p RTOR exlap, washout, ileocolic resection with end ileostomy, blow hole colostomy, red rubber from ileostomy to small bowel anastomosis; vicryl bridging mesh on (7/5) transferred to SICU postoperatively for hemodynamic monitoring, with hospital course complicated by periods of severe ELVI and hyponatremia, which resolved but stepped back up for to SICU on (9/10) for acute AMS, intermittent hypoglycemia, AFib with RVR. Percutaneous Cholecystostomy placed on (9/11) for enlarged GB, PCT capped 10/5 and uncapped 10/25 for hyperbilirubinemia, Right brachial DVT, left basillic and cephalic superficial thrombus on duplex 11/2, CT 11/14 with ALDEN ground glass opacity of unclear etiology, completed empiric 7day cefepime course 11/22, and another course of 7day cefepime for PNA  (1/15-23). rising T-bili of unclear etiology, resolved candida glabrata fungemia.    Continue with Gattex  Reg Diet  Continue transition to PO meds  Wean NC  SQL/SCDs/OOBA/IS  Regular wound manager changes  Continue care per primary/SICU

## 2024-02-02 NOTE — PROCEDURE NOTE - NSUSDIAGNOSIS_ED_ALL
Pleural Effusion Right/Pleural Effusion Left
Pleural Effusion Right/Pleural Effusion Left
Pleural Effusion Left

## 2024-02-02 NOTE — PROGRESS NOTE ADULT - SUBJECTIVE AND OBJECTIVE BOX
Interval Events:  Overnight events: Anxious about sleep. Given 3rd dose of ativan - 0.5mg po, extra melatonin dose. 2LNC per request. Per pharmacy, IV to PO ativan conversion is 1:1. Decreased TPN to 80% of needs. Patient seen and examined at bedside.      Allergies    penicillin (Angioedema)    Intolerances        Vital Signs Last 24 Hrs  T(C): 36.6 (02 Feb 2024 08:32), Max: 36.8 (01 Feb 2024 22:21)  T(F): 97.9 (02 Feb 2024 08:32), Max: 98.3 (01 Feb 2024 22:21)  HR: 92 (02 Feb 2024 10:00) (90 - 93)  BP: 142/70 (02 Feb 2024 10:00) (125/60 - 170/73)  BP(mean): 101 (02 Feb 2024 10:00) (86 - 108)  RR: 18 (02 Feb 2024 10:00) (18 - 31)  SpO2: 96% (02 Feb 2024 10:00) (94% - 99%)    Parameters below as of 02 Feb 2024 10:00  Patient On (Oxygen Delivery Method): room air        02-01 @ 07:01 - 02-02 @ 07:00  --------------------------------------------------------  IN: 2457.9 mL / OUT: 2320 mL / NET: 137.9 mL    02-02 @ 07:01 - 02-02 @ 10:53  --------------------------------------------------------  IN: 386 mL / OUT: 350 mL / NET: 36 mL      02-01 @ 07:01  -  02-02 @ 07:00  --------------------------------------------------------  IN: 2457.9 mL / OUT: 2320 mL / NET: 137.9 mL    02-02 @ 07:01  -  02-02 @ 10:53  --------------------------------------------------------  IN: 386 mL / OUT: 350 mL / NET: 36 mL        Physical Exam:     Neurological: AAOx3, CNII-XII intact,  strength 5/5 b/l  ENT: mucus membrane moist  Cardiovascular: RRR  Respiratory: CTA  Gastrointestinal: soft, NT, ND, BS+, no bleeding from ileostomy, fistula darkish red content, no active bleeding  Extremities: warm, dependent edema improved   Vascular: no cyanosis/erythema  Skin: no rashes, generalized jaundice present   MSK: no joint swelling.       LABS:      CBC Full  -  ( 02 Feb 2024 05:23 )  WBC Count : 8.14 K/uL  RBC Count : 2.75 M/uL  Hemoglobin : 8.8 g/dL  Hematocrit : 26.9 %  Platelet Count - Automated : 161 K/uL  Mean Cell Volume : 97.8 fl  Mean Cell Hemoglobin : 32.0 pg  Mean Cell Hemoglobin Concentration : 32.7 gm/dL  Auto Neutrophil # : x  Auto Lymphocyte # : x  Auto Monocyte # : x  Auto Eosinophil # : x  Auto Basophil # : x  Auto Neutrophil % : x  Auto Lymphocyte % : x  Auto Monocyte % : x  Auto Eosinophil % : x  Auto Basophil % : x    02-02    133<L>  |  101  |  38<H>  ----------------------------<  143<H>  4.0   |  25  |  1.16    Ca    8.2<L>      02 Feb 2024 05:23  Phos  3.6     02-02  Mg     2.0     02-02    TPro  8.2  /  Alb  2.3<L>  /  TBili  4.6<H>  /  DBili  3.0<H>  /  AST  104<H>  /  ALT  61<H>  /  AlkPhos  123<H>  02-02          Urinalysis Basic - ( 02 Feb 2024 05:23 )    Color: x / Appearance: x / SG: x / pH: x  Gluc: 143 mg/dL / Ketone: x  / Bili: x / Urobili: x   Blood: x / Protein: x / Nitrite: x   Leuk Esterase: x / RBC: x / WBC x   Sq Epi: x / Non Sq Epi: x / Bacteria: x              RADIOLOGY & ADDITIONAL STUDIES (The following images were personally reviewed):          A/p:A/p: 78M w PMH Crohn's, AFib/Flutter s/p DCCVs on amiodarone, remote ileocectomy and open appendectomy. Admitted (6/23) for SBO vs Crohns flare, s/p NGT decompression and s/p lap converted to open TRE, SBR x 3, left in discontinuity with abthera vac on (6/27), RTOR for ileocolic resection, small bowel anastomosis, and abdominal wall closure on (6/28), c/b fluid collection s/p IR aspiration of perihepatic fluid on (7/3), c/b wound dehiscence s/p RTOR exlap, washout, ileocolic resection with end ileostomy, blow hole colostomy, red rubber from ileostomy to small bowel anastomosis; vicryl bridging mesh on (7/5) transferred to SICU postoperatively for hemodynamic monitoring, with hospital course complicated by periods of severe ELVI and hyponatremia, which resolved but stepped back up for to SICU on (9/10) for acute AMS, intermittent hypoglycemia, AFib with RVR. Percutaneous Cholecystostomy placed on (9/11) for enlarged GB, PCT capped 10/5 and uncapped 10/25 for hyperbilirubinemia, Right brachial DVT, left basillic and cephalic superficial thrombus on duplex 11/2, CT 11/14 with ALDEN ground glass opacity of unclear etiology, completed empiric 7day cefepime course 11/22, and another course of 7day cefepime for PNA  (1/15-23). rising T-bili of unclear etiology, resolved candida glabrata fungemia, ELVI improving.     NEURO: Hx of depression: cont Effexor 150 QD.  Anxiety: Lorazepam QHS&PRN. Holistic consult for anxiety and insomnia. Gabapentin for restless leg syndrome.   HEENT: Timolol eye gtt added on 1/10 for additional systemic BB support in setting of malabsorption  CV:Fluid overload resolved.  Hx of Afib and recent Aflutter alternating with sinus tachycardia (HR 100s): Changed to po  Lopressor 50 q12. Cont timolol eye ggt for attempted systemic affect. Cont Lovenox. S/p previous DCCV, off Amiodarone and Lipitor due to persistent transaminitis. BLE duplex (1/5) no DVT, TTE  (7/18) - PASP 64mmHg, EF 65-70%. Holding Losartan & norvasc, TTE (1/4) LVEF 75%, mild/mod AR, PASP 40, RVEF wnl.  PULM: Atelectasis/dyspnea improved clinically: cont room air, will dc Bipap. PRN duonebs for wheezing. Encourage OOB and IS.   GI/FEN: Low res, low fat, high protein diet. Cont Ensure max x1/day however pt likely not absorbing adequately due to proximal fistula. Folate, thiamine. 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 Continue TPN/lipids cont Octreotide & Cholestyramine 10/18. Transaminitis slowly improved, elevated T bili, s/p Perc Deb on 9/11, uncapped on 1/15, recap on (1/17). been seen by Hepatology - MRCP 10/30 no obstruction, cont ursodiol, RUQ US 11/25 CBD 5mm, hepatic vessels patent. Cont Vit D weekly.   : Voids. ELVI improved: stopped famotidine given renal dysfunction. MARLO Duplex and RP US unremarkable, CK wnl.    ENDO: Hx hypothyroid: Cont Synthroid,  Repeat thyroid studies WNL 1/3/24.   ID: currently not on any abx. Completed cefepime for PNA (1/15-23) BCx 11/22 grew candida glabrata, likely CLABSI. s/p Caspo & completed Fluconazole course (12/1-12/9). Ophthalmology evaluated - normal ocular exam. Echo no vegetation. PICC replaced on 12/4. Cont Nystatin powder. // DC: caspofungin (11/24-12/1). empiric 7days cefepime (11/15-11/22), C. tertium, Lactobacillus from IR cx 7/3, and candida albicans, lactobacillus, vanc sensitive E faecium, vanc resistant E gallinarum, vanc resistant E casseliflavus, lactobacillus from OR cx 7/5. Completed course of abx with Imipenem (9/10-9/15). Imipenem (8/26--) imipenem (6/30-7/12, 7/23-7/30), Dapto (6/30-7/5 and 7/23-7/24). Empiric Dapto (8/23-24) and Cefepime (8/23-24).   PPX: SCDs, currently on PPX lovenox given hx of thrombosis.   HEME: Anemia - continue Epogen weekly. Previously treated with Iron and intermittent transfusions.   LINES: PIVs, RUE PICC placed (1/5--) will plan to switch PICC once a month in setting of fungemia history, next switch will be around 2/5 // DC: LUE PICC (9/1-11/1 ), RUE PICC: (11/1-11/24), LUE PICC (12/4-1/5)  WOUNDS/DRAINS: Abdominal wound and fistula with wound manager in place dressing change daily. Had recurrent punctate bleeding at wound bed, s/p placed surgicel, attempt to stitch, electrocautery and epi soaked gauze. Cont epinephrine soaked gauze at bedside as needed for wound bleeding. RLQ Ostomy. IR perc deb tube recapped 1/17 // DC: L Clay drain.  PT: Ambulate as tolerated OOB to chair daily. Repeat PT consult ordered on 2/2.   DISPO: SICU due to complicated hospital course. Cont Labs every other day. Patient will need a hospital bed and gel overlay for home use. Patients head needs to be elevated 30 degrees to prevent aspirations and wedges and pillows have been tried and failed. Patient is unable to make frequent changes to body position on their own. The member can independently affect the adjustment by operating the controls. Will most likely need rolling walker

## 2024-02-03 NOTE — PROGRESS NOTE ADULT - SUBJECTIVE AND OBJECTIVE BOX
SUBJECTIVE: Patient seen and examined at bedside. Patient states that he is feeling well with no acute complaints. Denies nausea or vomiting.      enoxaparin Injectable 40 milliGRAM(s) SubCutaneous every 24 hours  metoprolol tartrate 50 milliGRAM(s) Oral every 12 hours      Vital Signs Last 24 Hrs  T(C): 37.1 (03 Feb 2024 11:15), Max: 37.1 (03 Feb 2024 11:15)  T(F): 98.8 (03 Feb 2024 11:15), Max: 98.8 (03 Feb 2024 11:15)  HR: 92 (03 Feb 2024 12:30) (92 - 94)  BP: 116/63 (03 Feb 2024 12:30) (107/58 - 144/66)  BP(mean): 84 (03 Feb 2024 12:30) (78 - 99)  RR: 17 (03 Feb 2024 12:30) (16 - 23)  SpO2: 99% (03 Feb 2024 12:30) (97% - 100%)    Parameters below as of 03 Feb 2024 11:15  Patient On (Oxygen Delivery Method): room air      I&O's Detail    02 Feb 2024 07:01  -  03 Feb 2024 07:00  --------------------------------------------------------  IN:    Fat Emulsion (Fish Oil &amp; Plant Based) 20% Infusion: 249.6 mL    TPN (Total Parenteral Nutrition): 1906 mL  Total IN: 2155.6 mL    OUT:    Drain (mL): 725 mL    Voided (mL): 1955 mL  Total OUT: 2680 mL    Total NET: -524.4 mL      03 Feb 2024 07:01  -  03 Feb 2024 15:46  --------------------------------------------------------  IN:    Oral Fluid: 90 mL  Total IN: 90 mL    OUT:    Drain (mL): 200 mL    Voided (mL): 500 mL  Total OUT: 700 mL    Total NET: -610 mL          General: NAD, resting comfortably in bed  C/V: NSR  Pulm: Nonlabored breathing, no respiratory distress  Abd: soft, NT/ND, no further bleeding from ileostomy or fistula  Extrem: WWP, no edema, SCDs in place      LABS:                        8.0    8.68  )-----------( 151      ( 03 Feb 2024 05:30 )             24.8     02-03    137  |  104  |  44<H>  ----------------------------<  138<H>  4.0   |  27  |  1.13    Ca    7.8<L>      03 Feb 2024 05:30  Phos  4.0     02-03  Mg     2.2     02-03    TPro  8.2  /  Alb  2.3<L>  /  TBili  4.6<H>  /  DBili  3.0<H>  /  AST  104<H>  /  ALT  61<H>  /  AlkPhos  123<H>  02-02      Urinalysis Basic - ( 03 Feb 2024 05:30 )    Color: x / Appearance: x / SG: x / pH: x  Gluc: 138 mg/dL / Ketone: x  / Bili: x / Urobili: x   Blood: x / Protein: x / Nitrite: x   Leuk Esterase: x / RBC: x / WBC x   Sq Epi: x / Non Sq Epi: x / Bacteria: x        RADIOLOGY & ADDITIONAL STUDIES:

## 2024-02-03 NOTE — PROGRESS NOTE ADULT - CRITICAL CARE ATTENDING COMMENT
Patient seen and examined;  Looks better today  Bilirubin  improving   Follow outputs
Patient seen and examined;  Fungemia noted; ID plans noted;  Discussed at length plans with the patient   Repeat blood cultures to look for clearing
Patient seen an examined;  Stable    No cchange in current regimen
Patient seen and examined;  Improved;  ID plans noted;  Wound care
Patient seen and examined;  Discussed with staff;  To get a unit of blood
Patient seen and examined;  Stable examination;  Discussed with surgery team

## 2024-02-03 NOTE — PROGRESS NOTE ADULT - ASSESSMENT
A/p: 78M w PMH Crohn's, AFib/Flutter s/p DCCVs on amiodarone, remote ileocectomy and open appendectomy. Admitted (6/23) for SBO vs Crohns flare, s/p NGT decompression and s/p lap converted to open TRE, SBR x 3, left in discontinuity with abthera vac on (6/27), RTOR for ileocolic resection, small bowel anastomosis, and abdominal wall closure on (6/28), c/b fluid collection s/p IR aspiration of perihepatic fluid on (7/3), c/b wound dehiscence s/p RTOR exlap, washout, ileocolic resection with end ileostomy, blow hole colostomy, red rubber from ileostomy to small bowel anastomosis; vicryl bridging mesh on (7/5) transferred to SICU postoperatively for hemodynamic monitoring, with hospital course complicated by periods of severe ELVI and hyponatremia, which resolved but stepped back up for to SICU on (9/10) for acute AMS, intermittent hypoglycemia, AFib with RVR. Percutaneous Cholecystostomy placed on (9/11) for enlarged GB, PCT capped 10/5 and uncapped 10/25 for hyperbilirubinemia, Right brachial DVT, left basillic and cephalic superficial thrombus on duplex 11/2, CT 11/14 with ALDEN ground glass opacity of unclear etiology, completed empiric 7day cefepime course 11/22, and another course of 7day cefepime for PNA  (1/15-23). rising T-bili of unclear etiology, resolved candida glabrata fungemia, ELVI resolved.     NEURO: Hx of depression: cont Effexor 150 QD.  Anxiety: Lorazepam PRN. Holistic consult for anxiety and insomnia. Gabapentin for restless leg syndrome.   HEENT: Timolol eye gtt added on 1/10 for additional systemic BB support in setting of malabsorption  CV:Fluid overload resolved.  Hx of Afib and recent Aflutter alternating with sinus tachycardia (HR 100s): Changed to po  Lopressor 50 q12. Cont timolol eye ggt for attempted systemic affect. Cont Lovenox. S/p previous DCCV, off Amiodarone and Lipitor due to persistent transaminitis. BLE duplex (1/5) no DVT, TTE  (7/18) - PASP 64mmHg, EF 65-70%. Holding Losartan & norvasc, TTE (1/4) LVEF 75%, mild/mod AR, PASP 40, RVEF wnl.  PULM: Atelectasis/dyspnea improved clinically: cont room air, will dc Bipap. PRN duonebs for wheezing. Encourage OOB and IS.   GI/FEN: Low res, low fat, high protein diet. Cont Ensure max x1/day however pt likely not absorbing adequately due to proximal fistula. Folate, thiamine. 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 Continue TPN/lipids cont Octreotide & Cholestyramine 10/18. Transaminitis slowly improved, elevated T bili, s/p Perc Deb on 9/11, uncapped on 1/15, recap on (1/17). been seen by Hepatology - MRCP 10/30 no obstruction, cont ursodiol, RUQ US 11/25 CBD 5mm, hepatic vessels patent. Cont Vit D weekly.   : Voids. ELVI improved: stopped famotidine given renal dysfunction. MARLO Duplex and RP US unremarkable, CK wnl.    ENDO: Hx hypothyroid: Cont Synthroid,  Repeat thyroid studies WNL 1/3/24.   ID: currently not on any abx. Completed cefepime for PNA (1/15-23) BCx 11/22 grew candida glabrata, likely CLABSI. s/p Caspo & completed Fluconazole course (12/1-12/9). Ophthalmology evaluated - normal ocular exam. Echo no vegetation. PICC replaced on 12/4. Cont Nystatin powder. // DC: caspofungin (11/24-12/1). empiric 7days cefepime (11/15-11/22), C. tertium, Lactobacillus from IR cx 7/3, and candida albicans, lactobacillus, vanc sensitive E faecium, vanc resistant E gallinarum, vanc resistant E casseliflavus, lactobacillus from OR cx 7/5. Completed course of abx with Imipenem (9/10-9/15). Imipenem (8/26--) imipenem (6/30-7/12, 7/23-7/30), Dapto (6/30-7/5 and 7/23-7/24). Empiric Dapto (8/23-24) and Cefepime (8/23-24).   PPX: SCDs, currently on PPX lovenox given hx of thrombosis.   HEME: Anemia - continue Epogen weekly. Previously treated with Iron and intermittent transfusions.   LINES: PIVs, RUE PICC placed (1/5--) will plan to switch PICC once a month in setting of fungemia history, next switch will be around 2/5 // DC: LUE PICC (9/1-11/1 ), RUE PICC: (11/1-11/24), LUE PICC (12/4-1/5)  WOUNDS/DRAINS: Abdominal wound and fistula with wound manager in place dressing change daily. Had recurrent punctate bleeding at wound bed, s/p placed surgicel, attempt to stitch, electrocautery and epi soaked gauze. Cont epinephrine soaked gauze at bedside as needed for wound bleeding. RLQ Ostomy. IR perc deb tube recapped 1/17 // DC: L Clay drain.  PT: Ambulate as tolerated OOB to chair daily. Repeat PT consult orderd on 2/2.   DISPO: SICU due to complicated hospital course. Cont Labs every other day  A/p: 78M w PMH Crohn's, AFib/Flutter s/p DCCVs on amiodarone, remote ileocectomy and open appendectomy. Admitted (6/23) for SBO vs Crohns flare, s/p NGT decompression and s/p lap converted to open TRE, SBR x 3, left in discontinuity with abthera vac on (6/27), RTOR for ileocolic resection, small bowel anastomosis, and abdominal wall closure on (6/28), c/b fluid collection s/p IR aspiration of perihepatic fluid on (7/3), c/b wound dehiscence s/p RTOR exlap, washout, ileocolic resection with end ileostomy, blow hole colostomy, red rubber from ileostomy to small bowel anastomosis; vicryl bridging mesh on (7/5) transferred to SICU postoperatively for hemodynamic monitoring, with hospital course complicated by periods of severe ELVI and hyponatremia, which resolved but stepped back up for to SICU on (9/10) for acute AMS, intermittent hypoglycemia, AFib with RVR. Percutaneous Cholecystostomy placed on (9/11) for enlarged GB, PCT capped 10/5 and uncapped 10/25 for hyperbilirubinemia, Right brachial DVT, left basillic and cephalic superficial thrombus on duplex 11/2, CT 11/14 with ALDEN ground glass opacity of unclear etiology, completed empiric 7day cefepime course 11/22, and another course of 7day cefepime for PNA  (1/15-23), hyperbilirubinemia of unclear etiology, resolved candida glabrata fungemia, LEVI resolved.     NEURO: Hx of depression: cont Effexor 150 QD.  Anxiety: Lorazepam PRN. Holistic consult for anxiety and insomnia. Gabapentin for restless leg syndrome.   HEENT: Timolol eye gtt added on 1/10 for additional systemic BB support in setting of malabsorption  CV:Fluid overload resolved.  Hx of Afib and recent Aflutter alternating with sinus tachycardia (HR 100s): Changed to po  Lopressor 50 q12. Cont timolol eye ggt for attempted systemic affect. Cont Lovenox. S/p previous DCCV, off Amiodarone and Lipitor due to persistent transaminitis. BLE duplex (1/5) no DVT, TTE  (7/18) - PASP 64mmHg, EF 65-70%. Holding Losartan & norvasc, TTE (1/4) LVEF 75%, mild/mod AR, PASP 40, RVEF wnl.  PULM: Atelectasis/dyspnea improved clinically: cont room air, will dc Bipap. PRN duonebs for wheezing. Encourage OOB and IS.   GI/FEN: Low res, low fat, high protein diet, Ensure Max. 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 Continue TPN/lipids (now running over 12 hours), Cont Octreotide & Cholestyramine 10/18. Transaminitis slowly improved, elevated T bili, s/p Perc Deb on 9/11, uncapped on 1/15, recap on (1/17). Seen by Hepatology - MRCP 10/30 no obstruction, cont ursodiol, RUQ US 11/25 CBD 5mm, hepatic vessels patent. Cont Vit D weekly.   : Voids. ELVI improved: stopped famotidine given renal dysfunction. MARLO Duplex and RP US unremarkable, CK wnl.    ENDO: Hx hypothyroid: Cont Synthroid,  Repeat thyroid studies WNL 1/3/24.   ID: currently not on any abx. Completed cefepime for PNA (1/15-23) BCx 11/22 grew candida glabrata, likely CLABSI. s/p Caspo & completed Fluconazole course (12/1-12/9). Ophthalmology evaluated - normal ocular exam. Echo no vegetation. PICC replaced on 12/4. Cont Nystatin powder. // DC: caspofungin (11/24-12/1). empiric 7days cefepime (11/15-11/22), C. tertium, Lactobacillus from IR cx 7/3, and candida albicans, lactobacillus, vanc sensitive E faecium, vanc resistant E gallinarum, vanc resistant E casseliflavus, lactobacillus from OR cx 7/5. Completed course of abx with Imipenem (9/10-9/15). Imipenem (8/26--) imipenem (6/30-7/12, 7/23-7/30), Dapto (6/30-7/5 and 7/23-7/24). Empiric Dapto (8/23-24) and Cefepime (8/23-24).   PPX: SCDs, currently on PPX lovenox given hx of thrombosis.   HEME: Anemia - continue Epogen weekly. Previously treated with Iron and intermittent transfusions. Wound base bleeding x 2 yesterday--Hg 8 from 8.8, will receive 1U pRBC today.  LINES: PIVs, RUE PICC placed (1/5--) will plan to switch PICC once a month in setting of fungemia history, next switch will be around 2/5 // DC: LUE PICC (9/1-11/1 ), RUE PICC: (11/1-11/24), LUE PICC (12/4-1/5)  WOUNDS/DRAINS: Abdominal wound and fistula with wound manager in place dressing change daily. Had recurrent punctate bleeding at wound bed, s/p placed surgicel, attempt to stitch, electrocautery and epi soaked gauze. Cont epinephrine soaked gauze at bedside as needed for wound bleeding. RLQ Ostomy. IR perc deb tube recapped 1/17 // DC: L Clay drain.  PT: Ambulate as tolerated OOB to chair daily. Repeat PT consult orderd on 2/2.   DISPO: SICU due to complicated hospital course. Cont Labs every other day

## 2024-02-03 NOTE — PROGRESS NOTE ADULT - SUBJECTIVE AND OBJECTIVE BOX
Covering for Dr. Peterson over the weekend.  Patient had bleeding from the granulation tissue around the EC site yesterday, controlled with direct pressure and surgicell application.  Otherwise patient remains stable with discharge planning in the works.

## 2024-02-03 NOTE — PROGRESS NOTE ADULT - ASSESSMENT
78M w PMH Crohn's, AFib/Flutter s/p DCCVs on amiodarone, remote ileocectomy and open appendectomy. Admitted (6/23) for SBO vs Crohns flare, s/p NGT decompression and s/p lap converted to open TRE, SBR x 3, left in discontinuity with abthera vac on (6/27), RTOR for ileocolic resection, small bowel anastomosis, and abdominal wall closure on (6/28), c/b fluid collection s/p IR aspiration of perihepatic fluid on (7/3), c/b wound dehiscence s/p RTOR exlap, washout, ileocolic resection with end ileostomy, blow hole colostomy, red rubber from ileostomy to small bowel anastomosis; vicryl bridging mesh on (7/5) transferred to SICU postoperatively for hemodynamic monitoring, with hospital course complicated by periods of severe ELVI and hyponatremia, which resolved but stepped back up for to SICU on (9/10) for acute AMS, intermittent hypoglycemia, AFib with RVR. Percutaneous Cholecystostomy placed on (9/11) for enlarged GB, PCT capped 10/5 and uncapped 10/25 for hyperbilirubinemia, Right brachial DVT, left basillic and cephalic superficial thrombus on duplex 11/2, CT 11/14 with ALDEN ground glass opacity of unclear etiology, completed empiric 7day cefepime course 11/22, and another course of 7day cefepime for PNA  (1/15-23). rising T-bili of unclear etiology, resolved candida glabrata fungemia.    Recs:  Reg Diet  Continue transition to PO meds  SQL/SCDs/OOBA/IS  Regular wound manager changes  Continue care per primary/SICU

## 2024-02-03 NOTE — PROGRESS NOTE ADULT - SUBJECTIVE AND OBJECTIVE BOX
INTERVAL/OVERNIGHT EVENTS: Ostomy wound base bled around 3pm, controlled with pressure and Surgicell--wound manager replaced. Overnight had another episode of bleeding--controlled with pressure and Surgicell by night team--wound manager replaced.     SUBJECTIVE: This AM, doing well without complaints Feels tired.    Neurologic Medications  gabapentin 100 milliGRAM(s) Oral at bedtime  LORazepam     Tablet 0.25 milliGRAM(s) Oral every 12 hours PRN Anxiety  LORazepam     Tablet 1 milliGRAM(s) Oral at bedtime  melatonin 5 milliGRAM(s) Oral at bedtime PRN Insomnia  venlafaxine XR. 225 milliGRAM(s) Oral daily    Cardiovascular Medications  metoprolol tartrate 50 milliGRAM(s) Oral every 12 hours    Gastrointestinal Medications  ergocalciferol 36976 Unit(s) Oral <User Schedule>  lipid, fat emulsion (Fish Oil and Plant Based) 20% Infusion 0.54 Gm/kG/Day IV Continuous <Continuous>  lipid, fat emulsion (Fish Oil and Plant Based) 20% Infusion 0.54 Gm/kG/Day IV Continuous <Continuous>  Parenteral Nutrition - Adult 1 Each TPN Continuous <Continuous>  Parenteral Nutrition - Adult 1 Each TPN Continuous <Continuous>  teduglutide Injectable 4.7 milliGRAM(s) SubCutaneous every 24 hours  ursodiol Suspension 300 milliGRAM(s) Oral every 8 hours    Hematologic/Oncologic Medications  enoxaparin Injectable 40 milliGRAM(s) SubCutaneous every 24 hours  epoetin matt-epbx (RETACRIT) Injectable 33816 Unit(s) SubCutaneous every 7 days    Endocrine/Metabolic Medications  cholestyramine Powder (Sugar-Free) 4 Gram(s) Oral daily  glucagon  Injectable 1 milliGRAM(s) IntraMuscular once  levothyroxine 37.5 MICROGram(s) Oral daily    Topical/Other Medications  chlorhexidine 0.12% Liquid 15 milliLiter(s) Swish and Spit two times a day PRN oral hygeine  chlorhexidine 2% Cloths 1 Application(s) Topical <User Schedule>  EPINEPHrine   1 mG/mL (1:1,000) Topical Solution 1 Application(s) Topical every 24 hours PRN for wound bleeding  silver nitrate Applicator 1 Application(s) Topical once PRN wound edge bleeding  timolol 0.5% Solution 1 Drop(s) Both EYES daily    MEDICATIONS  (PRN):  chlorhexidine 0.12% Liquid 15 milliLiter(s) Swish and Spit two times a day PRN oral hygeine  EPINEPHrine   1 mG/mL (1:1,000) Topical Solution 1 Application(s) Topical every 24 hours PRN for wound bleeding  LORazepam     Tablet 0.25 milliGRAM(s) Oral every 12 hours PRN Anxiety  melatonin 5 milliGRAM(s) Oral at bedtime PRN Insomnia  silver nitrate Applicator 1 Application(s) Topical once PRN wound edge bleeding    I&O's Detail    02 Feb 2024 07:01  -  03 Feb 2024 07:00  --------------------------------------------------------  IN:    Fat Emulsion (Fish Oil &amp; Plant Based) 20% Infusion: 249.6 mL    TPN (Total Parenteral Nutrition): 1906 mL  Total IN: 2155.6 mL    OUT:    Drain (mL): 725 mL    Voided (mL): 1955 mL  Total OUT: 2680 mL    Total NET: -524.4 mL    Vital Signs Last 24 Hrs  T(C): 36.8 (03 Feb 2024 00:53), Max: 36.8 (02 Feb 2024 22:26)  T(F): 98.2 (03 Feb 2024 00:53), Max: 98.3 (02 Feb 2024 22:26)  HR: 94 (03 Feb 2024 06:15) (92 - 94)  BP: 133/63 (03 Feb 2024 06:15) (107/58 - 144/66)  BP(mean): 91 (03 Feb 2024 06:15) (78 - 101)  RR: 23 (03 Feb 2024 06:15) (18 - 23)  SpO2: 100% (03 Feb 2024 06:15) (96% - 100%)    Parameters below as of 03 Feb 2024 06:15  Patient On (Oxygen Delivery Method): room air    Neurological: AAOx3, CNII-XII intact,  strength 5/5 b/l  ENT: mucus membrane moist  Cardiovascular: RRR  Respiratory: CTA  Gastrointestinal: soft, NT, ND, BS+, no bleeding from ileostomy, fistula patent, no active bleeding  Extremities: warm, dependent edema improved   Vascular: no cyanosis/erythema  Skin: no rashes, generalized jaundice present   MSK: no joint swelling.     LABS:                        8.0    8.68  )-----------( 151      ( 03 Feb 2024 05:30 )             24.8     02-03    137  |  104  |  44<H>  ----------------------------<  138<H>  4.0   |  27  |  1.13    Ca    7.8<L>      03 Feb 2024 05:30  Phos  4.0     02-03  Mg     2.2     02-03    TPro  8.2  /  Alb  2.3<L>  /  TBili  4.6<H>  /  DBili  3.0<H>  /  AST  104<H>  /  ALT  61<H>  /  AlkPhos  123<H>  02-02    Urinalysis Basic - ( 03 Feb 2024 05:30 )    Color: x / Appearance: x / SG: x / pH: x  Gluc: 138 mg/dL / Ketone: x  / Bili: x / Urobili: x   Blood: x / Protein: x / Nitrite: x   Leuk Esterase: x / RBC: x / WBC x   Sq Epi: x / Non Sq Epi: x / Bacteria: x

## 2024-02-03 NOTE — PROGRESS NOTE ADULT - ASSESSMENT
Discussed with ICU team  To have EP reeval episode of LOC with ?pause on monitor  May need transfusion

## 2024-02-04 NOTE — PROGRESS NOTE ADULT - SUBJECTIVE AND OBJECTIVE BOX
SUBJECTIVE: Patient examined bedside by chief resident. Patient resting comfortably without acute complaints. Tolerating diet, OOB.     Vital Signs Last 24 Hrs  T(C): 36.8 (04 Feb 2024 05:03), Max: 36.8 (04 Feb 2024 05:03)  T(F): 98.3 (04 Feb 2024 05:03), Max: 98.3 (04 Feb 2024 05:03)  HR: 96 (04 Feb 2024 07:00) (93 - 96)  BP: 120/56 (04 Feb 2024 07:00) (116/62 - 161/79)  BP(mean): 79 (04 Feb 2024 07:00) (79 - 113)  RR: 18 (04 Feb 2024 07:00) (18 - 24)  SpO2: 100% (04 Feb 2024 07:00) (99% - 100%)    Parameters below as of 04 Feb 2024 07:00  Patient On (Oxygen Delivery Method): nasal cannula  O2 Flow (L/min): 2      I&O's Summary    03 Feb 2024 07:01  -  04 Feb 2024 07:00  --------------------------------------------------------  IN: 2389.2 mL / OUT: 2575 mL / NET: -185.8 mL    04 Feb 2024 07:01  -  04 Feb 2024 13:14  --------------------------------------------------------  IN: 0 mL / OUT: 200 mL / NET: -200 mL        Physical Exam:  General Appearance: Appears well, NAD  Pulmonary: Nonlabored breathing, no respiratory distress  Cardiovascular: NSR  Abdomen: Soft, NTND, EAF healing appropriately covered w wound manager, ileostomy w min output, no signs of bleeding  Extremities: WWP, SCD's in place     LABS:                        8.8    9.29  )-----------( 176      ( 04 Feb 2024 05:30 )             26.5     02-03    137  |  104  |  44<H>  ----------------------------<  138<H>  4.0   |  27  |  1.13    Ca    7.8<L>      03 Feb 2024 05:30  Phos  4.0     02-03  Mg     2.2     02-03        Urinalysis Basic - ( 03 Feb 2024 05:30 )    Color: x / Appearance: x / SG: x / pH: x  Gluc: 138 mg/dL / Ketone: x  / Bili: x / Urobili: x   Blood: x / Protein: x / Nitrite: x   Leuk Esterase: x / RBC: x / WBC x   Sq Epi: x / Non Sq Epi: x / Bacteria: x

## 2024-02-04 NOTE — PROGRESS NOTE ADULT - SUBJECTIVE AND OBJECTIVE BOX
(Covering for Dr. Peterson)  Patient seen and examined, discussed with Dr. Cook ([facility]).  Patient remains unchanged last 24h.  Continue wound management, D/C planning.

## 2024-02-04 NOTE — PROGRESS NOTE ADULT - SUBJECTIVE AND OBJECTIVE BOX
INTERVAL/OVERNIGHT EVENTS: Did not sleep well last night.    SUBJECTIVE: This AM, doing well but is tired. No N/V or abd pain. Eating well. No SOB/CP.    Neurologic Medications  gabapentin 100 milliGRAM(s) Oral at bedtime  LORazepam     Tablet 0.25 milliGRAM(s) Oral every 12 hours PRN Anxiety  LORazepam     Tablet 1 milliGRAM(s) Oral at bedtime  melatonin 5 milliGRAM(s) Oral at bedtime PRN Insomnia  venlafaxine XR. 225 milliGRAM(s) Oral daily    Cardiovascular Medications  metoprolol tartrate 50 milliGRAM(s) Oral every 12 hours    Gastrointestinal Medications  ergocalciferol 73690 Unit(s) Oral <User Schedule>  lipid, fat emulsion (Fish Oil and Plant Based) 20% Infusion 0.54 Gm/kG/Day IV Continuous <Continuous>  Parenteral Nutrition - Adult 1 Each TPN Continuous <Continuous>  Parenteral Nutrition - Adult 1 Each TPN Continuous <Continuous>  teduglutide Injectable 4.7 milliGRAM(s) SubCutaneous every 24 hours  ursodiol Suspension 300 milliGRAM(s) Oral every 8 hours    Hematologic/Oncologic Medications  enoxaparin Injectable 40 milliGRAM(s) SubCutaneous every 24 hours  epoetin matt-epbx (RETACRIT) Injectable 27554 Unit(s) SubCutaneous every 7 days    Endocrine/Metabolic Medications  cholestyramine Powder (Sugar-Free) 4 Gram(s) Oral daily  glucagon  Injectable 1 milliGRAM(s) IntraMuscular once  levothyroxine 37.5 MICROGram(s) Oral daily    Topical/Other Medications  chlorhexidine 0.12% Liquid 15 milliLiter(s) Swish and Spit two times a day PRN oral hygeine  chlorhexidine 2% Cloths 1 Application(s) Topical <User Schedule>  EPINEPHrine   1 mG/mL (1:1,000) Topical Solution 1 Application(s) Topical every 24 hours PRN for wound bleeding  silver nitrate Applicator 1 Application(s) Topical once PRN wound edge bleeding  timolol 0.5% Solution 1 Drop(s) Both EYES daily    MEDICATIONS  (PRN):  chlorhexidine 0.12% Liquid 15 milliLiter(s) Swish and Spit two times a day PRN oral hygeine  EPINEPHrine   1 mG/mL (1:1,000) Topical Solution 1 Application(s) Topical every 24 hours PRN for wound bleeding  LORazepam     Tablet 0.25 milliGRAM(s) Oral every 12 hours PRN Anxiety  melatonin 5 milliGRAM(s) Oral at bedtime PRN Insomnia  silver nitrate Applicator 1 Application(s) Topical once PRN wound edge bleeding    I&O's Detail    03 Feb 2024 07:01  -  04 Feb 2024 07:00  --------------------------------------------------------  IN:    Fat Emulsion (Fish Oil &amp; Plant Based) 20% Infusion: 291.2 mL    Oral Fluid: 210 mL    TPN (Total Parenteral Nutrition): 1888 mL  Total IN: 2389.2 mL    OUT:    Drain (mL): 775 mL    Voided (mL): 1800 mL  Total OUT: 2575 mL    Total NET: -185.8 mL    04 Feb 2024 07:01  -  04 Feb 2024 11:21  --------------------------------------------------------  IN:  Total IN: 0 mL    OUT:    Drain (mL): 200 mL  Total OUT: 200 mL    Total NET: -200 mL    Vital Signs Last 24 Hrs  T(C): 36.8 (04 Feb 2024 05:03), Max: 36.8 (04 Feb 2024 05:03)  T(F): 98.3 (04 Feb 2024 05:03), Max: 98.3 (04 Feb 2024 05:03)  HR: 96 (04 Feb 2024 07:00) (92 - 96)  BP: 120/56 (04 Feb 2024 07:00) (116/62 - 161/79)  BP(mean): 79 (04 Feb 2024 07:00) (79 - 113)  RR: 18 (04 Feb 2024 07:00) (17 - 24)  SpO2: 100% (04 Feb 2024 07:00) (99% - 100%)    Parameters below as of 04 Feb 2024 07:00  Patient On (Oxygen Delivery Method): nasal cannula  O2 Flow (L/min): 2    Neurological: AAOx3, CNII-XII intact,  strength 5/5 b/l  ENT: mucus membrane moist  Cardiovascular: RRR  Respiratory: CTA  Gastrointestinal: soft, NT, ND, BS+, no bleeding from ileostomy, fistula patent, no active bleeding  Extremities: warm, dependent edema   Vascular: no cyanosis/erythema  Skin: no rashes, generalized jaundice present   MSK: no joint swelling.     LABS:                        8.8    9.29  )-----------( 176      ( 04 Feb 2024 05:30 )             26.5     02-03    137  |  104  |  44<H>  ----------------------------<  138<H>  4.0   |  27  |  1.13    Ca    7.8<L>      03 Feb 2024 05:30  Phos  4.0     02-03  Mg     2.2     02-03    Urinalysis Basic - ( 03 Feb 2024 05:30 )    Color: x / Appearance: x / SG: x / pH: x  Gluc: 138 mg/dL / Ketone: x  / Bili: x / Urobili: x   Blood: x / Protein: x / Nitrite: x   Leuk Esterase: x / RBC: x / WBC x   Sq Epi: x / Non Sq Epi: x / Bacteria: x

## 2024-02-04 NOTE — PROGRESS NOTE ADULT - ASSESSMENT
78M w PMH Crohn's, AFib/Flutter s/p DCCVs on amiodarone, remote ileocectomy and open appendectomy. Admitted (6/23) for SBO vs Crohns flare, s/p NGT decompression and s/p lap converted to open TRE, SBR x 3, left in discontinuity with abthera vac on (6/27), RTOR for ileocolic resection, small bowel anastomosis, and abdominal wall closure on (6/28), c/b fluid collection s/p IR aspiration of perihepatic fluid on (7/3), c/b wound dehiscence s/p RTOR exlap, washout, ileocolic resection with end ileostomy, blow hole colostomy, red rubber from ileostomy to small bowel anastomosis; vicryl bridging mesh on (7/5) transferred to SICU postoperatively for hemodynamic monitoring, with hospital course complicated by periods of severe ELVI and hyponatremia, which resolved but stepped back up for to SICU on (9/10) for acute AMS, intermittent hypoglycemia, AFib with RVR. Percutaneous Cholecystostomy placed on (9/11) for enlarged GB, PCT capped 10/5 and uncapped 10/25 for hyperbilirubinemia, Right brachial DVT, left basillic and cephalic superficial thrombus on duplex 11/2, CT 11/14 with ALDEN ground glass opacity of unclear etiology, completed empiric 7day cefepime course 11/22, and another course of 7day cefepime for PNA  (1/15-23), hyperbilirubinemia of unclear etiology, resolved candida glabrata fungemia, ELVI resolved.     NEURO: Hx of depression: cont Effexor 150 QD.  Anxiety: Lorazepam PRN. Holistic consult for anxiety and insomnia. Gabapentin for restless leg syndrome.   HEENT: Timolol eye gtt added on 1/10 for additional systemic BB support in setting of malabsorption  CV:Fluid overload resolved.  Hx of Afib and recent Aflutter alternating with sinus tachycardia (HR 100s): Changed to po  Lopressor 50 q12. Cont timolol eye ggt for attempted systemic affect. Cont Lovenox. S/p previous DCCV, off Amiodarone and Lipitor due to persistent transaminitis. BLE duplex (1/5) no DVT, TTE  (7/18) - PASP 64mmHg, EF 65-70%. Holding Losartan & norvasc, TTE (1/4) LVEF 75%, mild/mod AR, PASP 40, RVEF wnl.  PULM: Atelectasis/dyspnea improved clinically: cont room air, will dc Bipap. PRN duonebs for wheezing. Encourage OOB and IS.   GI/FEN: Low res, low fat, high protein diet, Ensure Max. 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 Continue TPN/lipids (now running over 12 hours), Cont Octreotide & Cholestyramine 10/18. Transaminitis slowly improved, elevated T bili, s/p Perc Deb on 9/11, uncapped on 1/15, recap on (1/17). Seen by Hepatology - MRCP 10/30 no obstruction, cont ursodiol, RUQ US 11/25 CBD 5mm, hepatic vessels patent. Cont Vit D weekly.   : Voids. ELVI improved: stopped famotidine given renal dysfunction. MARLO Duplex and RP US unremarkable, CK wnl.    ENDO: Hx hypothyroid: Cont Synthroid,  Repeat thyroid studies WNL 1/3/24.   ID: currently not on any abx. Completed cefepime for PNA (1/15-23) BCx 11/22 grew candida glabrata, likely CLABSI. s/p Caspo & completed Fluconazole course (12/1-12/9). Ophthalmology evaluated - normal ocular exam. Echo no vegetation. PICC replaced on 12/4. Cont Nystatin powder. // DC: caspofungin (11/24-12/1). empiric 7days cefepime (11/15-11/22), C. tertium, Lactobacillus from IR cx 7/3, and candida albicans, lactobacillus, vanc sensitive E faecium, vanc resistant E gallinarum, vanc resistant E casseliflavus, lactobacillus from OR cx 7/5. Completed course of abx with Imipenem (9/10-9/15). Imipenem (8/26--) imipenem (6/30-7/12, 7/23-7/30), Dapto (6/30-7/5 and 7/23-7/24). Empiric Dapto (8/23-24) and Cefepime (8/23-24).   PPX: SCDs, currently on PPX lovenox given hx of thrombosis.   HEME: Anemia - continue Epogen weekly. Previously treated with Iron and intermittent transfusions. Wound base bleeding x 2 yesterday--Hg 8 from 8.8, will receive 1U pRBC today.  LINES: PIVs, RUE PICC placed (1/5--) will plan to switch PICC once a month in setting of fungemia history, next switch will be around 2/5 // DC: LUE PICC (9/1-11/1 ), RUE PICC: (11/1-11/24), LUE PICC (12/4-1/5)  WOUNDS/DRAINS: Abdominal wound and fistula with wound manager in place dressing change daily. Had recurrent punctate bleeding at wound bed, s/p placed surgicel, attempt to stitch, electrocautery and epi soaked gauze. Cont epinephrine soaked gauze at bedside as needed for wound bleeding. RLQ Ostomy. IR perc deb tube recapped 1/17 // DC: L Clay drain.  PT: Ambulate as tolerated OOB to chair daily. Repeat PT consult orderd on 2/2.   DISPO: SICU due to complicated hospital course. Cont Labs every other day

## 2024-02-05 NOTE — PROGRESS NOTE ADULT - ATTENDING COMMENTS
I saw and evaluated the patient. I agree with the findings and the plan of care as documented in the EP FELLOW’s note.

## 2024-02-05 NOTE — ANESTHESIA FOLLOW-UP NOTE - NSEVALATION_GEN_ALL_CORE
No apparent complications or complaints regarding anesthesia care at this time/All questions were answered
All questions were answered

## 2024-02-05 NOTE — PROGRESS NOTE ADULT - ASSESSMENT
78M w PMH Crohn's, AFib/Flutter s/p DCCVs on amiodarone, remote ileocectomy and open appendectomy. Admitted (6/23) for SBO vs Crohns flare, s/p NGT decompression and s/p lap converted to open TRE, SBR x 3, left in discontinuity with abthera vac on (6/27), RTOR for ileocolic resection, small bowel anastomosis, and abdominal wall closure on (6/28), c/b fluid collection s/p IR aspiration of perihepatic fluid on (7/3), c/b wound dehiscence s/p RTOR exlap, washout, ileocolic resection with end ileostomy, blow hole colostomy, red rubber from ileostomy to small bowel anastomosis; vicryl bridging mesh on (7/5) transferred to SICU postoperatively for hemodynamic monitoring, with hospital course complicated by periods of severe ELVI and hyponatremia, which resolved but stepped back up for to SICU on (9/10) for acute AMS, intermittent hypoglycemia, AFib with RVR. Percutaneous Cholecystostomy placed on (9/11) for enlarged GB, PCT capped 10/5 and uncapped 10/25 for hyperbilirubinemia, Right brachial DVT, left basillic and cephalic superficial thrombus on duplex 11/2, CT 11/14 with ALDEN ground glass opacity of unclear etiology, completed empiric 7day cefepime course 11/22, and another course of 7day cefepime for PNA  (1/15-23). rising T-bili of unclear etiology, resolved candida glabrata fungemia.    Over the weekend, 20 second run of asystole  EP consulted - f/u recs for PM placement  Recommend epinephrine-soaked gauze for bleeding from friable granulation tissue  Appreciate excellent SICU care

## 2024-02-05 NOTE — PROGRESS NOTE ADULT - SUBJECTIVE AND OBJECTIVE BOX
SUBJECTIVE: Patient seen and examined at bedside. Comfortable without complaints, no additional episodes of sinus pause.    enoxaparin Injectable 40 milliGRAM(s) SubCutaneous every 24 hours  metoprolol tartrate 50 milliGRAM(s) Oral every 12 hours      Vital Signs Last 24 Hrs  T(C): 37.1 (05 Feb 2024 09:48), Max: 37.1 (05 Feb 2024 09:14)  T(F): 98.7 (05 Feb 2024 09:14), Max: 98.7 (05 Feb 2024 09:14)  HR: 94 (05 Feb 2024 10:27) (72 - 95)  BP: 128/64 (05 Feb 2024 10:27) (115/77 - 209/95)  BP(mean): 89 (05 Feb 2024 10:27) (85 - 137)  RR: 25 (05 Feb 2024 10:27) (19 - 25)  SpO2: 94% (05 Feb 2024 10:27) (94% - 100%)    Parameters below as of 05 Feb 2024 10:27  Patient On (Oxygen Delivery Method): room air      I&O's Detail    04 Feb 2024 07:01  -  05 Feb 2024 07:00  --------------------------------------------------------  IN:    Fat Emulsion (Fish Oil &amp; Plant Based) 20% Infusion: 249.6 mL    Oral Fluid: 718 mL    TPN (Total Parenteral Nutrition): 2096 mL  Total IN: 3063.6 mL    OUT:    Drain (mL): 1 mL    Drain (mL): 1200 mL    Voided (mL): 1700 mL  Total OUT: 2901 mL    Total NET: 162.6 mL      05 Feb 2024 07:01  -  05 Feb 2024 10:31  --------------------------------------------------------  IN:  Total IN: 0 mL    OUT:    Voided (mL): 100 mL  Total OUT: 100 mL    Total NET: -100 mL          General: NAD, resting comfortably in bed  C/V: NSR  Pulm: Nonlabored breathing, no respiratory distress  Abd: soft, NT/ND. Wound manager in place without evident bleeding. Perc cony tube capped. Fistula with bilious/enteric output.  Extrem: WWP, no edema, SCDs in place      LABS:                        8.2    8.90  )-----------( 168      ( 05 Feb 2024 05:30 )             25.3     02-05    137  |  103  |  44<H>  ----------------------------<  117<H>  4.3   |  28  |  1.14    Ca    7.8<L>      05 Feb 2024 05:30  Phos  3.7     02-05  Mg     2.1     02-05    TPro  8.3  /  Alb  2.3<L>  /  TBili  4.2<H>  /  DBili  x   /  AST  105<H>  /  ALT  58<H>  /  AlkPhos  122<H>  02-05    PT/INR - ( 05 Feb 2024 05:30 )   PT: 14.8 sec;   INR: 1.31          PTT - ( 05 Feb 2024 05:30 )  PTT:35.2 sec  Urinalysis Basic - ( 05 Feb 2024 05:30 )    Color: x / Appearance: x / SG: x / pH: x  Gluc: 117 mg/dL / Ketone: x  / Bili: x / Urobili: x   Blood: x / Protein: x / Nitrite: x   Leuk Esterase: x / RBC: x / WBC x   Sq Epi: x / Non Sq Epi: x / Bacteria: x

## 2024-02-05 NOTE — PRE-ANESTHESIA EVALUATION ADULT - MALLAMPATI CLASS
Class II - visualization of the soft palate, fauces, and uvula
intubated/sedated
Class II - visualization of the soft palate, fauces, and uvula
Class III - visualization of the soft palate and the base of the uvula
Class II - visualization of the soft palate, fauces, and uvula
Class II - visualization of the soft palate, fauces, and uvula

## 2024-02-05 NOTE — PROGRESS NOTE ADULT - ASSESSMENT
78M with history of Crohn's disease, known AF/AFL s/p multiple DCCV in the past, previously on amiodarone, with a prolonged hospital stay following extensive abdominal surgery c/b fistula. Experienced a post-micturition pause + syncope on 1/22. Yesterday afternoon, the patient experienced bradycardia and a 20 sec pause while asleep.     -patient consented for a leadless pacemaker  -hold anticoagulation for now  -keep NPO  -d/w primary team

## 2024-02-05 NOTE — PROGRESS NOTE ADULT - SUBJECTIVE AND OBJECTIVE BOX
INTERVAL EVENTS: 20 sec pause yest afternoon while patient was asleep; no further pauses since. No o/n events. Denies CP, dyspnea, palpitations, presyncope, syncope, f/c/n/v.     REVIEW OF SYSTEMS:  Constitutional:     [X] negative [ ] fevers [ ] chills [ ] weight loss [ ] weight gain  HEENT:                  [X] negative [ ] dry eyes [ ] eye irritation [ ] postnasal drip [ ] nasal congestion  CV:                         [X] negative  [ ] chest pain [ ] orthopnea [ ] palpitations [ ] murmur  Resp:                     [X] negative [ ] cough [ ] shortness of breath [ ] wheezing [ ] sputum [ ] hemoptysis  GI:                          [X] negative [ ] nausea [ ] vomiting [ ] diarrhea [ ] constipation [ ] abd pain [ ] dysphagia   :                        [X] negative [ ] dysuria [ ] nocturia [ ] hematuria [ ] increased urinary frequency  MSK:                      [X] negative [ ] back pain [ ] myalgias [ ] arthralgias [ ] fracture  Skin:                       [X] negative [ ] rash [ ] itch  Neuro:                   [X] negative [ ] headache [ ] dizziness [ ] syncope [ ] weakness [ ] numbness  Psych:                    [X] negative [ ] anxiety [ ] depression  Endo:                     [X] negative [ ] diabetes [ ] thyroid problem  Heme/Lymph:      [X] negative [ ] anemia [ ] bleeding problem  Allergic/Immune: [X] negative [ ] itchy eyes [ ] nasal discharge [ ] hives [ ] angioedema    [X] All other systems negative or otherwise described above.  [ ] Unable to assess ROS due to ________.    PAST MEDICAL & SURGICAL HISTORY:  Essential hypertension  Hypertension    Atrial fibrillation  s/p cardioversion 2010 and 2014  Pt. reports 4 DCCV  Now on Amiodarone 200mg PO bid and Eliquis 5mg PO bid  Last DCCV 4 yrs ago at [facility]    Crohn's disease  s/p partial resection of ileum    Hyperlipidemia    Hypothyroidism    ELVI (acute kidney injury)  No hx of CKD   Creatinine 1.5 on this admisison 4/5/22    History of depression  On Venlafaxine ER 150mg PO bid    Junctional rhythm    Bradycardia    Other specified personal history presenting hazards to health  History of bowel resection    H/O knee surgery    History of cataract surgery      MEDICATIONS  (STANDING):  chlorhexidine 2% Cloths 1 Application(s) Topical <User Schedule>  cholestyramine Powder (Sugar-Free) 4 Gram(s) Oral daily  enoxaparin Injectable 40 milliGRAM(s) SubCutaneous every 24 hours  epoetin matt-epbx (RETACRIT) Injectable 61702 Unit(s) SubCutaneous every 7 days  ergocalciferol 98174 Unit(s) Oral <User Schedule>  gabapentin 100 milliGRAM(s) Oral at bedtime  glucagon  Injectable 1 milliGRAM(s) IntraMuscular once  levothyroxine 37.5 MICROGram(s) Oral daily  lipid, fat emulsion (Fish Oil and Plant Based) 20% Infusion 0.54 Gm/kG/Day (20.83 mL/Hr) IV Continuous <Continuous>  lipid, fat emulsion (Fish Oil and Plant Based) 20% Infusion 0.54 Gm/kG/Day (20.83 mL/Hr) IV Continuous <Continuous>  LORazepam     Tablet 1 milliGRAM(s) Oral at bedtime  metoprolol tartrate 50 milliGRAM(s) Oral every 12 hours  Parenteral Nutrition - Adult 1 Each TPN Continuous <Continuous>  Parenteral Nutrition - Adult 1 Each TPN Continuous <Continuous>  teduglutide Injectable 4.7 milliGRAM(s) SubCutaneous every 24 hours  timolol 0.5% Solution 1 Drop(s) Both EYES daily  ursodiol Suspension 300 milliGRAM(s) Oral every 8 hours  venlafaxine XR. 225 milliGRAM(s) Oral daily    MEDICATIONS  (PRN):  chlorhexidine 0.12% Liquid 15 milliLiter(s) Swish and Spit two times a day PRN oral hygeine  EPINEPHrine   1 mG/mL (1:1,000) Topical Solution 1 Application(s) Topical every 24 hours PRN for wound bleeding  LORazepam     Tablet 0.25 milliGRAM(s) Oral every 12 hours PRN Anxiety  melatonin 5 milliGRAM(s) Oral at bedtime PRN Insomnia  silver nitrate Applicator 1 Application(s) Topical once PRN wound edge bleeding    ICU Vital Signs Last 24 Hrs  T(C): 37.1 (05 Feb 2024 09:48), Max: 37.1 (05 Feb 2024 09:14)  T(F): 98.7 (05 Feb 2024 09:14), Max: 98.7 (05 Feb 2024 09:14)  HR: 95 (05 Feb 2024 09:48) (72 - 95)  BP: 131/70 (05 Feb 2024 09:48) (115/77 - 209/95)  BP(mean): 96 (05 Feb 2024 09:48) (85 - 137)  ABP: --  ABP(mean): --  RR: 21 (05 Feb 2024 09:48) (19 - 25)  SpO2: 99% (05 Feb 2024 09:48) (97% - 100%)    O2 Parameters below as of 05 Feb 2024 09:48    O2 Flow (L/min): 2  O2 Concentration (%): 40      Orthostatic VS    Daily Height in cm: 188 (05 Feb 2024 09:48)    Daily   I&O's Summary    04 Feb 2024 07:01  -  05 Feb 2024 07:00  --------------------------------------------------------  IN: 3063.6 mL / OUT: 2901 mL / NET: 162.6 mL    05 Feb 2024 07:01  -  05 Feb 2024 10:17  --------------------------------------------------------  IN: 0 mL / OUT: 100 mL / NET: -100 mL        PHYSICAL EXAM:  GENERAL: No acute distress, well-developed  ENT: EOMI, conjunctiva and sclera clear, Neck supple, No JVD, moist mucosa  CHEST/LUNG: Clear to auscultation bilaterally; No wheeze, equal breath sounds bilaterally  HEART: Regular rhythm; No murmurs, rubs, or gallops, radial and DP 2+ b/l, euvolemic  ABDOMEN: Soft, Nontender, Nondistended  EXTREMITIES:  No clubbing, cyanosis, or edema  NEUROLOGY: AAOx3, non-focal    LABS:                        8.2    8.90  )-----------( 168      ( 05 Feb 2024 05:30 )             25.3     PT/INR - ( 05 Feb 2024 05:30 )   PT: 14.8 sec;   INR: 1.31          PTT - ( 05 Feb 2024 05:30 )  PTT:35.2 sec  02-05    137  |  103  |  44<H>  ----------------------------<  117<H>  4.3   |  28  |  1.14    Ca    7.8<L>      05 Feb 2024 05:30  Phos  3.7     02-05  Mg     2.1     02-05    TPro  8.3  /  Alb  2.3<L>  /  TBili  4.2<H>  /  DBili  x   /  AST  105<H>  /  ALT  58<H>  /  AlkPhos  122<H>  02-05        Urinalysis Basic - ( 05 Feb 2024 05:30 )    Color: x / Appearance: x / SG: x / pH: x  Gluc: 117 mg/dL / Ketone: x  / Bili: x / Urobili: x   Blood: x / Protein: x / Nitrite: x   Leuk Esterase: x / RBC: x / WBC x   Sq Epi: x / Non Sq Epi: x / Bacteria: x          RADIOLOGY & ADDITIONAL STUDIES:    ECG: Personally reviewed; atrial fibrillation    Telemetry: reviewed; atrial fibrillation; no further pauses since yesterday    Echo:   < from: TTE Echo Complete w/ Contrast w/ Doppler (01.04.24 @ 10:19) >  CONCLUSIONS:     1. Technically difficult study.   2. Hyperdynamic left ventricular systolic function.   3. Moderate symmetric left ventricular hypertrophy.   4. Grade II left ventriculardiastolic dysfunction.   5. Normal right ventricular size and systolic function.   6. Normal atria.   7. Aortic sclerosis without significant stenosis.   8. Mild-to-moderate aortic regurgitation.   9. Pulmonary artery systolic pressure is 40 mmHg, assuming a right atrial pressure of 8 mmHg.  10. No pericardial effusion.  11. Left pleural effusion.  12. Compared to the previous TTE performed on 11/24/2023, there is mild pulmonary hypertension and TR has increased.    < end of copied text >      Stress Testing: none    Cath: none    CXR: Personally reviewed  < from: Xray Chest 1 View- PORTABLE-Urgent (Xray Chest 1 View- PORTABLE-Urgent .) (01.23.24 @ 21:05) >  IMPRESSION: Pulmonary congestion and pleural effusions, right greater   than left.    --- End of Report ---    < end of copied text >    Other cardiac imaging: none    Other misc imaging: none       INTERVAL EVENTS: 20 sec pause yest afternoon while patient was asleep; no further pauses since. No o/n events. Denies CP, dyspnea, palpitations, presyncope, syncope, f/c/n/v.     REVIEW OF SYSTEMS:  Constitutional:     [X] negative [ ] fevers [ ] chills [ ] weight loss [ ] weight gain  HEENT:                  [X] negative [ ] dry eyes [ ] eye irritation [ ] postnasal drip [ ] nasal congestion  CV:                         [X] negative  [ ] chest pain [ ] orthopnea [ ] palpitations [ ] murmur  Resp:                     [X] negative [ ] cough [ ] shortness of breath [ ] wheezing [ ] sputum [ ] hemoptysis  GI:                          [X] negative [ ] nausea [ ] vomiting [ ] diarrhea [ ] constipation [ ] abd pain [ ] dysphagia   :                        [X] negative [ ] dysuria [ ] nocturia [ ] hematuria [ ] increased urinary frequency  MSK:                      [X] negative [ ] back pain [ ] myalgias [ ] arthralgias [ ] fracture  Skin:                       [X] negative [ ] rash [ ] itch  Neuro:                   [X] negative [ ] headache [ ] dizziness [ ] syncope [ ] weakness [ ] numbness  Psych:                    [X] negative [ ] anxiety [ ] depression  Endo:                     [X] negative [ ] diabetes [ ] thyroid problem  Heme/Lymph:      [X] negative [ ] anemia [ ] bleeding problem  Allergic/Immune: [X] negative [ ] itchy eyes [ ] nasal discharge [ ] hives [ ] angioedema    [X] All other systems negative or otherwise described above.  [ ] Unable to assess ROS due to ________.    PAST MEDICAL & SURGICAL HISTORY:  Essential hypertension  Hypertension    Atrial fibrillation  s/p cardioversion 2010 and 2014  Pt. reports 4 DCCV  Now on Amiodarone 200mg PO bid and Eliquis 5mg PO bid  Last DCCV 4 yrs ago at [facility]    Crohn's disease  s/p partial resection of ileum    Hyperlipidemia    Hypothyroidism    ELVI (acute kidney injury)  No hx of CKD   Creatinine 1.5 on this admisison 4/5/22    History of depression  On Venlafaxine ER 150mg PO bid    Junctional rhythm    Bradycardia    Other specified personal history presenting hazards to health  History of bowel resection    H/O knee surgery    History of cataract surgery      MEDICATIONS  (STANDING):  chlorhexidine 2% Cloths 1 Application(s) Topical <User Schedule>  cholestyramine Powder (Sugar-Free) 4 Gram(s) Oral daily  enoxaparin Injectable 40 milliGRAM(s) SubCutaneous every 24 hours  epoetin matt-epbx (RETACRIT) Injectable 62711 Unit(s) SubCutaneous every 7 days  ergocalciferol 14578 Unit(s) Oral <User Schedule>  gabapentin 100 milliGRAM(s) Oral at bedtime  glucagon  Injectable 1 milliGRAM(s) IntraMuscular once  levothyroxine 37.5 MICROGram(s) Oral daily  lipid, fat emulsion (Fish Oil and Plant Based) 20% Infusion 0.54 Gm/kG/Day (20.83 mL/Hr) IV Continuous <Continuous>  lipid, fat emulsion (Fish Oil and Plant Based) 20% Infusion 0.54 Gm/kG/Day (20.83 mL/Hr) IV Continuous <Continuous>  LORazepam     Tablet 1 milliGRAM(s) Oral at bedtime  metoprolol tartrate 50 milliGRAM(s) Oral every 12 hours  Parenteral Nutrition - Adult 1 Each TPN Continuous <Continuous>  Parenteral Nutrition - Adult 1 Each TPN Continuous <Continuous>  teduglutide Injectable 4.7 milliGRAM(s) SubCutaneous every 24 hours  timolol 0.5% Solution 1 Drop(s) Both EYES daily  ursodiol Suspension 300 milliGRAM(s) Oral every 8 hours  venlafaxine XR. 225 milliGRAM(s) Oral daily    MEDICATIONS  (PRN):  chlorhexidine 0.12% Liquid 15 milliLiter(s) Swish and Spit two times a day PRN oral hygeine  EPINEPHrine   1 mG/mL (1:1,000) Topical Solution 1 Application(s) Topical every 24 hours PRN for wound bleeding  LORazepam     Tablet 0.25 milliGRAM(s) Oral every 12 hours PRN Anxiety  melatonin 5 milliGRAM(s) Oral at bedtime PRN Insomnia  silver nitrate Applicator 1 Application(s) Topical once PRN wound edge bleeding    ICU Vital Signs Last 24 Hrs  T(C): 37.1 (05 Feb 2024 09:48), Max: 37.1 (05 Feb 2024 09:14)  T(F): 98.7 (05 Feb 2024 09:14), Max: 98.7 (05 Feb 2024 09:14)  HR: 95 (05 Feb 2024 09:48) (72 - 95)  BP: 131/70 (05 Feb 2024 09:48) (115/77 - 209/95)  BP(mean): 96 (05 Feb 2024 09:48) (85 - 137)  ABP: --  ABP(mean): --  RR: 21 (05 Feb 2024 09:48) (19 - 25)  SpO2: 99% (05 Feb 2024 09:48) (97% - 100%)    O2 Parameters below as of 05 Feb 2024 09:48    O2 Flow (L/min): 2  O2 Concentration (%): 40      Orthostatic VS    Daily Height in cm: 188 (05 Feb 2024 09:48)    Daily   I&O's Summary    04 Feb 2024 07:01  -  05 Feb 2024 07:00  --------------------------------------------------------  IN: 3063.6 mL / OUT: 2901 mL / NET: 162.6 mL    05 Feb 2024 07:01  -  05 Feb 2024 10:17  --------------------------------------------------------  IN: 0 mL / OUT: 100 mL / NET: -100 mL        PHYSICAL EXAM:  GENERAL: No acute distress, well-developed  ENT: EOMI, conjunctiva and sclera clear, Neck supple, No JVD, moist mucosa  CHEST/LUNG: Clear to auscultation bilaterally; No wheeze, equal breath sounds bilaterally  HEART: Regular rhythm; No murmurs, rubs, or gallops, radial and DP 2+ b/l, euvolemic  ABDOMEN: Soft, Nontender, Nondistended  EXTREMITIES:  No clubbing, cyanosis, or edema  NEUROLOGY: AAOx3, non-focal    LABS:                        8.2    8.90  )-----------( 168      ( 05 Feb 2024 05:30 )             25.3     PT/INR - ( 05 Feb 2024 05:30 )   PT: 14.8 sec;   INR: 1.31          PTT - ( 05 Feb 2024 05:30 )  PTT:35.2 sec  02-05    137  |  103  |  44<H>  ----------------------------<  117<H>  4.3   |  28  |  1.14    Ca    7.8<L>      05 Feb 2024 05:30  Phos  3.7     02-05  Mg     2.1     02-05    TPro  8.3  /  Alb  2.3<L>  /  TBili  4.2<H>  /  DBili  x   /  AST  105<H>  /  ALT  58<H>  /  AlkPhos  122<H>  02-05        Urinalysis Basic - ( 05 Feb 2024 05:30 )    Color: x / Appearance: x / SG: x / pH: x  Gluc: 117 mg/dL / Ketone: x  / Bili: x / Urobili: x   Blood: x / Protein: x / Nitrite: x   Leuk Esterase: x / RBC: x / WBC x   Sq Epi: x / Non Sq Epi: x / Bacteria: x          RADIOLOGY & ADDITIONAL STUDIES:    Telemetry: reviewed; no further pauses since yesterday    Echo:   < from: TTE Echo Complete w/ Contrast w/ Doppler (01.04.24 @ 10:19) >  CONCLUSIONS:     1. Technically difficult study.   2. Hyperdynamic left ventricular systolic function.   3. Moderate symmetric left ventricular hypertrophy.   4. Grade II left ventriculardiastolic dysfunction.   5. Normal right ventricular size and systolic function.   6. Normal atria.   7. Aortic sclerosis without significant stenosis.   8. Mild-to-moderate aortic regurgitation.   9. Pulmonary artery systolic pressure is 40 mmHg, assuming a right atrial pressure of 8 mmHg.  10. No pericardial effusion.  11. Left pleural effusion.  12. Compared to the previous TTE performed on 11/24/2023, there is mild pulmonary hypertension and TR has increased.    < end of copied text >      Stress Testing: none    Cath: none    CXR: Personally reviewed  < from: Xray Chest 1 View- PORTABLE-Urgent (Xray Chest 1 View- PORTABLE-Urgent .) (01.23.24 @ 21:05) >  IMPRESSION: Pulmonary congestion and pleural effusions, right greater   than left.    --- End of Report ---    < end of copied text >    Other cardiac imaging: none    Other misc imaging: none

## 2024-02-05 NOTE — PRE-ANESTHESIA EVALUATION ADULT - NSANTHOSAYNRD_GEN_A_CORE
No. JEET screening performed.  STOP BANG Legend: 0-2 = LOW Risk; 3-4 = INTERMEDIATE Risk; 5-8 = HIGH Risk

## 2024-02-05 NOTE — PRE-ANESTHESIA EVALUATION ADULT - NSDENTALSD_ENT_ALL_CORE
appears normal and intact
missing teeth
appears normal and intact
caps/bridge/implants
unable to assess
appears normal and intact

## 2024-02-05 NOTE — PROGRESS NOTE ADULT - ASSESSMENT
78M w PMH Crohn's, AFib/Flutter s/p DCCVs on amiodarone, remote ileocectomy and open appendectomy. Admitted (6/23) for SBO vs Crohns flare, s/p NGT decompression and s/p lap converted to open TRE, SBR x 3, left in discontinuity with abthera vac on (6/27), RTOR for ileocolic resection, small bowel anastomosis, and abdominal wall closure on (6/28), c/b fluid collection s/p IR aspiration of perihepatic fluid on (7/3), c/b wound dehiscence s/p RTOR exlap, washout, ileocolic resection with end ileostomy, blow hole colostomy, red rubber from ileostomy to small bowel anastomosis; vicryl bridging mesh on (7/5) transferred to SICU postoperatively for hemodynamic monitoring, with hospital course complicated by periods of severe ELVI and hyponatremia, which resolved but stepped back up for to SICU on (9/10) for acute AMS, intermittent hypoglycemia, AFib with RVR. Percutaneous Cholecystostomy placed on (9/11) for enlarged GB, PCT capped 10/5 and uncapped 10/25 for hyperbilirubinemia, Right brachial DVT, left basillic and cephalic superficial thrombus on duplex 11/2, CT 11/14 with ALDEN ground glass opacity of unclear etiology, completed empiric 7day cefepime course 11/22, and another course of 7day cefepime for PNA  (1/15-23), hyperbilirubinemia of unclear etiology, resolved candida glabrata fungemia, ELVI resolved, sinus Arrest--pacemaker placed (2/5).    NEURO: Hx of depression: cont Effexor 150 QD.  Anxiety: Lorazepam PRN. Holistic consult for anxiety and insomnia. Gabapentin for restless leg syndrome.   HEENT: Timolol eye gtt added on 1/10 for additional systemic BB support in setting of malabsorption  CV:Fluid overload resolved.  Hx of Afib and recent Aflutter alternating with sinus tachycardia (HR 100s): Changed to po Lopressor 50 q12. Cont timolol eye ggt for attempted systemic affect. Cont Lovenox. S/p previous DCCV, off Amiodarone and Lipitor due to persistent transaminitis. BLE duplex (1/5) no DVT, TTE  (7/18) - PASP 64mmHg, EF 65-70%. Holding Losartan & norvasc, TTE (1/4) LVEF 75%, mild/mod AR, PASP 40, RVEF wnl. S/p PPM 2/5.   PULM: Atelectasis/dyspnea improved clinically: cont room air, will dc Bipap. PRN duonebs for wheezing. Encourage OOB and IS.   GI/FEN: Low res, low fat, high protein diet, Ensure Max. 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 Continue TPN/lipids (now running over 12 hours), Cont Octreotide & Cholestyramine 10/18. Transaminitis slowly improved, elevated T bili, s/p Perc Deb on 9/11, uncapped on 1/15, recap on (1/17). Seen by Hepatology - MRCP 10/30 no obstruction, cont ursodiol, RUQ US 11/25 CBD 5mm, hepatic vessels patent. Cont Vit D weekly.   : Voids. ELVI improved: stopped famotidine given renal dysfunction. MARLO Duplex and RP US unremarkable, CK wnl.    ENDO: Hx hypothyroid: Cont Synthroid,  Repeat thyroid studies WNL 1/3/24.   ID: currently not on any abx. Completed cefepime for PNA (1/15-23) BCx 11/22 grew candida glabrata, likely CLABSI. s/p Caspo & completed Fluconazole course (12/1-12/9). Ophthalmology evaluated - normal ocular exam. Echo no vegetation. PICC replaced on 12/4. Cont Nystatin powder. // DC: caspofungin (11/24-12/1). empiric 7days cefepime (11/15-11/22), C. tertium, Lactobacillus from IR cx 7/3, and candida albicans, lactobacillus, vanc sensitive E faecium, vanc resistant E gallinarum, vanc resistant E casseliflavus, lactobacillus from OR cx 7/5. Completed course of abx with Imipenem (9/10-9/15). Imipenem (8/26--) imipenem (6/30-7/12, 7/23-7/30), Dapto (6/30-7/5 and 7/23-7/24). Empiric Dapto (8/23-24) and Cefepime (8/23-24).   PPX: SCDs, currently on PPX lovenox given hx of thrombosis.   HEME: Anemia - continue Epogen weekly. Previously treated with Iron and intermittent transfusions. Wound base bleeding x 2 yesterday--Hg 8 from 8.8, will receive 1U pRBC today.  LINES: PIVs, RUE PICC placed (1/5--) will plan to switch PICC once a month in setting of fungemia history, next switch will be around 2/5 // DC: LUE PICC (9/1-11/1 ), RUE PICC: (11/1-11/24), LUE PICC (12/4-1/5)  WOUNDS/DRAINS: Abdominal wound and fistula with wound manager in place dressing change daily. Had recurrent punctate bleeding at wound bed, s/p placed surgicel, attempt to stitch, electrocautery and epi soaked gauze. Cont epinephrine soaked gauze at bedside as needed for wound bleeding. RLQ Ostomy. IR perc deb tube recapped 1/17 // DC: L Clay drain.  PT: Ambulate as tolerated OOB to chair daily. Repeat PT consult orderd on 2/2.   DISPO: SICU due to complicated hospital course. Cont Labs every other day. Patient will need a Semi-electric hospital bed and gel overlay and side rails for home use. Patients head needs to be elevated 30 degrees to prevent aspirations and wedges and pillows have been tried and failed. Patient is unable to make frequent changes to body position on their own. The member can independently affect the adjustment by operating the controls. Length of need is 36 months. Will most likely need rolling walker.

## 2024-02-05 NOTE — PROGRESS NOTE ADULT - SUBJECTIVE AND OBJECTIVE BOX
INTERVAL/OVERNIGHT EVENTS: Had 20 second pause on telemetry, plan for pacemaker today.    SUBJECTIVE: Feels good today and well rested.     Neurologic Medications  gabapentin 100 milliGRAM(s) Oral at bedtime  LORazepam     Tablet 0.25 milliGRAM(s) Oral every 12 hours PRN Anxiety  LORazepam     Tablet 1 milliGRAM(s) Oral at bedtime  melatonin 5 milliGRAM(s) Oral at bedtime PRN Insomnia  venlafaxine XR. 225 milliGRAM(s) Oral daily    Cardiovascular Medications  metoprolol tartrate 50 milliGRAM(s) Oral every 12 hours    Gastrointestinal Medications  ergocalciferol 62246 Unit(s) Oral <User Schedule>  lipid, fat emulsion (Fish Oil and Plant Based) 20% Infusion 0.54 Gm/kG/Day IV Continuous <Continuous>  Parenteral Nutrition - Adult 1 Each TPN Continuous <Continuous>  Parenteral Nutrition - Adult 1 Each TPN Continuous <Continuous>  teduglutide Injectable 4.7 milliGRAM(s) SubCutaneous every 24 hours  ursodiol Suspension 300 milliGRAM(s) Oral every 8 hours    Hematologic/Oncologic Medications  enoxaparin Injectable 40 milliGRAM(s) SubCutaneous every 24 hours  epoetin matt-epbx (RETACRIT) Injectable 66952 Unit(s) SubCutaneous every 7 days    Endocrine/Metabolic Medications  cholestyramine Powder (Sugar-Free) 4 Gram(s) Oral daily  glucagon  Injectable 1 milliGRAM(s) IntraMuscular once  levothyroxine 37.5 MICROGram(s) Oral daily    Topical/Other Medications  chlorhexidine 0.12% Liquid 15 milliLiter(s) Swish and Spit two times a day PRN oral hygeine  chlorhexidine 2% Cloths 1 Application(s) Topical <User Schedule>  EPINEPHrine   1 mG/mL (1:1,000) Topical Solution 1 Application(s) Topical every 24 hours PRN for wound bleeding  silver nitrate Applicator 1 Application(s) Topical once PRN wound edge bleeding  timolol 0.5% Solution 1 Drop(s) Both EYES daily    MEDICATIONS  (PRN):  chlorhexidine 0.12% Liquid 15 milliLiter(s) Swish and Spit two times a day PRN oral hygeine  EPINEPHrine   1 mG/mL (1:1,000) Topical Solution 1 Application(s) Topical every 24 hours PRN for wound bleeding  LORazepam     Tablet 0.25 milliGRAM(s) Oral every 12 hours PRN Anxiety  melatonin 5 milliGRAM(s) Oral at bedtime PRN Insomnia  silver nitrate Applicator 1 Application(s) Topical once PRN wound edge bleeding    I&O's Detail    04 Feb 2024 07:01  -  05 Feb 2024 07:00  --------------------------------------------------------  IN:    Fat Emulsion (Fish Oil &amp; Plant Based) 20% Infusion: 249.6 mL    Oral Fluid: 718 mL    TPN (Total Parenteral Nutrition): 2096 mL  Total IN: 3063.6 mL    OUT:    Drain (mL): 1 mL    Drain (mL): 1200 mL    Voided (mL): 1700 mL  Total OUT: 2901 mL    Total NET: 162.6 mL    05 Feb 2024 07:01  -  05 Feb 2024 14:42  --------------------------------------------------------  IN:  Total IN: 0 mL    OUT:    Voided (mL): 250 mL  Total OUT: 250 mL    Total NET: -250 mL    Vital Signs Last 24 Hrs  T(C): 36.9 (05 Feb 2024 13:24), Max: 37.1 (05 Feb 2024 09:14)  T(F): 98.4 (05 Feb 2024 13:24), Max: 98.7 (05 Feb 2024 09:14)  HR: 92 (05 Feb 2024 14:00) (92 - 95)  BP: 123/59 (05 Feb 2024 14:00) (108/62 - 141/66)  BP(mean): 85 (05 Feb 2024 14:00) (78 - 97)  RR: 19 (05 Feb 2024 14:00) (19 - 25)  SpO2: 93% (05 Feb 2024 14:00) (92% - 100%)    Parameters below as of 05 Feb 2024 14:00  Patient On (Oxygen Delivery Method): nasal cannula  O2 Flow (L/min): 2    Neurological: AAOx3, CNII-XII intact,  strength 5/5 b/l  ENT: mucus membrane moist  Cardiovascular: RRR  Respiratory: CTA  Gastrointestinal: soft, NT, ND, BS+, no bleeding from ileostomy, fistula patent, no active bleeding  Extremities: warm, dependent edema   Vascular: no cyanosis/erythema  Skin: no rashes, generalized jaundice present   MSK: no joint swelling.     LABS:                        8.2    8.90  )-----------( 168      ( 05 Feb 2024 05:30 )             25.3     02-05    137  |  103  |  44<H>  ----------------------------<  117<H>  4.3   |  28  |  1.14    Ca    7.8<L>      05 Feb 2024 05:30  Phos  3.7     02-05  Mg     2.1     02-05    TPro  8.3  /  Alb  2.3<L>  /  TBili  4.2<H>  /  DBili  x   /  AST  105<H>  /  ALT  58<H>  /  AlkPhos  122<H>  02-05    PT/INR - ( 05 Feb 2024 05:30 )   PT: 14.8 sec;   INR: 1.31       PTT - ( 05 Feb 2024 05:30 )  PTT:35.2 sec  Urinalysis Basic - ( 05 Feb 2024 05:30 )    Color: x / Appearance: x / SG: x / pH: x  Gluc: 117 mg/dL / Ketone: x  / Bili: x / Urobili: x   Blood: x / Protein: x / Nitrite: x   Leuk Esterase: x / RBC: x / WBC x   Sq Epi: x / Non Sq Epi: x / Bacteria: x

## 2024-02-05 NOTE — PROGRESS NOTE ADULT - SUBJECTIVE AND OBJECTIVE BOX
ON: Small pinpoint bleeding at inferior aspect of wound - self resolved; feels well, some stool in appliance. preopped for possible pacemaker  2/4: TPN reordered. 20 second run of asystole, became transiently disoriented and felt "off". EP reconsulted - will assess tomorrow for PM, made NPO.       SUBJECTIVE: Patient seen and examined bedside by Surgical resident. Patient resting comfortably without acute complaints. Tolerating diet, OOB.     enoxaparin Injectable 40 milliGRAM(s) SubCutaneous every 24 hours  metoprolol tartrate 50 milliGRAM(s) Oral every 12 hours    MEDICATIONS  (PRN):  chlorhexidine 0.12% Liquid 15 milliLiter(s) Swish and Spit two times a day PRN oral hygeine  EPINEPHrine   1 mG/mL (1:1,000) Topical Solution 1 Application(s) Topical every 24 hours PRN for wound bleeding  LORazepam     Tablet 0.25 milliGRAM(s) Oral every 12 hours PRN Anxiety  melatonin 5 milliGRAM(s) Oral at bedtime PRN Insomnia  silver nitrate Applicator 1 Application(s) Topical once PRN wound edge bleeding      I&O's Detail    04 Feb 2024 07:01  -  05 Feb 2024 07:00  --------------------------------------------------------  IN:    Fat Emulsion (Fish Oil &amp; Plant Based) 20% Infusion: 249.6 mL    Oral Fluid: 718 mL    TPN (Total Parenteral Nutrition): 2096 mL  Total IN: 3063.6 mL    OUT:    Drain (mL): 1 mL    Drain (mL): 1200 mL    Voided (mL): 1700 mL  Total OUT: 2901 mL    Total NET: 162.6 mL      05 Feb 2024 07:01  -  05 Feb 2024 08:57  --------------------------------------------------------  IN:  Total IN: 0 mL    OUT:    Voided (mL): 100 mL  Total OUT: 100 mL    Total NET: -100 mL          Vital Signs Last 24 Hrs  T(C): 36.7 (05 Feb 2024 06:15), Max: 36.9 (04 Feb 2024 14:40)  T(F): 98 (05 Feb 2024 06:15), Max: 98.5 (04 Feb 2024 14:40)  HR: 95 (05 Feb 2024 05:35) (72 - 95)  BP: 131/70 (05 Feb 2024 05:35) (115/77 - 209/95)  BP(mean): 96 (05 Feb 2024 05:35) (85 - 137)  RR: 21 (05 Feb 2024 05:35) (19 - 25)  SpO2: 99% (05 Feb 2024 05:35) (96% - 100%)    Parameters below as of 05 Feb 2024 05:35  Patient On (Oxygen Delivery Method): nasal cannula w/ humidification  O2 Flow (L/min): 2      Physical Exam:  General Appearance: Appears well, NAD  Pulmonary: Nonlabored breathing, no respiratory distress  Cardiovascular: NSR  Abdomen: Soft, NTND, EAF healing appropriately covered w wound manager, ileostomy w min output, no signs of bleeding  Extremities: WWP, SCD's in place   LABS:                        8.2    8.90  )-----------( 168      ( 05 Feb 2024 05:30 )             25.3     02-05    137  |  103  |  44<H>  ----------------------------<  117<H>  4.3   |  28  |  1.14    Ca    7.8<L>      05 Feb 2024 05:30  Phos  3.7     02-05  Mg     2.1     02-05    TPro  8.3  /  Alb  2.3<L>  /  TBili  4.2<H>  /  DBili  x   /  AST  105<H>  /  ALT  58<H>  /  AlkPhos  122<H>  02-05    PT/INR - ( 05 Feb 2024 05:30 )   PT: 14.8 sec;   INR: 1.31          PTT - ( 05 Feb 2024 05:30 )  PTT:35.2 sec  Urinalysis Basic - ( 05 Feb 2024 05:30 )    Color: x / Appearance: x / SG: x / pH: x  Gluc: 117 mg/dL / Ketone: x  / Bili: x / Urobili: x   Blood: x / Protein: x / Nitrite: x   Leuk Esterase: x / RBC: x / WBC x   Sq Epi: x / Non Sq Epi: x / Bacteria: x        RADIOLOGY & ADDITIONAL STUDIES:

## 2024-02-05 NOTE — PRE-ANESTHESIA EVALUATION ADULT - NSRADCARDRESULTSFT_GEN_ALL_CORE
1. Technically difficult study.  2. Hyperdynamic left ventricular systolic function.  3. Moderate symmetric left ventricular hypertrophy.  4. Grade II left ventricular diastolic dysfunction.  5. Normal right ventricular size and systolic function.  6. Normal atria.  7. Aortic sclerosis without significant stenosis.  8. Mild-to-moderate aortic regurgitation.  9. Pulmonary artery systolic pressure is 40 mmHg, assuming a right atrial pressure of 8 mmHg.  10. No pericardial effusion.  11. Left pleural effusion.  12. Compared to the previous TTE performed on 11/24/2023, there is mild pulmonary   hypertension and TR has increased.

## 2024-02-05 NOTE — PRE-ANESTHESIA EVALUATION ADULT - NSANTHPEFT_GEN_ALL_CORE
General: Appearance is consistent with chronological age. No abnormal facies.  Constitutional: Alert and in no acute distress.  Eyes: The sclera and conjunctiva were normal, pupils were equal in size, round, and reactive to light and extraocular movements were intact.   ENT: The ears and nose were normal in appearance; oropharynx clear, moist mucus membranes.  Neck: The appearance of the neck was normal, no neck mass was observed. There was no jugular-venous distention.   Cardiovascular:  Rate and rhythm evaluated.  Respiratory: Unlabored breathing.  Neurological: No focal deficit, moves all extremities.  Psychiatric: Oriented to person, place, and time, insight and judgment were intact and the affect was normal.  Exercise Tolerance: MET>4.
intubated/sedated, on pressors, critically ill
AAOx3  B/L BS decreased   Heart wnl
CTA Irregularly irregular
alert and oriented x3, no acute distress, ctab,rrr
Awake and alert, no acute distress  RRR, no respiratory distress

## 2024-02-05 NOTE — PRE-ANESTHESIA EVALUATION ADULT - NSPROPOSEDPROCEDFT_GEN_ALL_CORE
MICRA pacemaker insertion
Exploratory Lap
Second look laparotomy with small bowel resection and abdominal wall resection
Exploratory laparotomy

## 2024-02-05 NOTE — CHART NOTE - NSCHARTNOTEFT_GEN_A_CORE
EPS BRIEF OP NOTE    LARRY KELLY  [id number]    PROCEDURE:  - Leadless pacemaker implantation (Micra AV2)    INDICATION:  - Intermittent heart block with pauses    ELECTROPHYSIOLOGIST(S):  - Dr. Hou (Attending)  - Dr. Walker (Fellow)    ANESTHESIOLOGY:  - Dr. Ferro     SEDATION TYPE:  - MAC  - Local    FINDINGS:  - US guided access of the RFV. 8Fr sheath upsized via dilator to Micra delivery sheath (27Fr outer)  - Successful implantation of a Medtronic Micra AV2 leadless pacemaker  - Hemostasis via figure of 8 2-0 Ethibond suture through a 3-way stopcock    COMPLICATIONS:  - None    RECOMMENDATIONS:  - 6 hours bedrest  - EP team to remove suture   - CXR PA/Lateral in AM    Please refer to the full report to follow in [facility] for a detailed description of this case.    --  Umair Walker MD  Electrophysiology PGY8 EPS BRIEF OP NOTE    LARRY KELLY  [id number]    PROCEDURE:  - Leadless pacemaker implantation (Micra AV2)    INDICATION:  - Intermittent heart block with pauses    ELECTROPHYSIOLOGIST(S):  - Dr. Hou (Attending)  - Dr. Walker (Fellow)    ANESTHESIOLOGY:  - Dr. Ferro     SEDATION TYPE:  - MAC  - Local    FINDINGS:  - US guided access of the RFV. 8Fr sheath upsized via dilator to Micra delivery sheath (27Fr outer)  - Successful implantation of a Medtronic Micra AV2 leadless pacemaker  - Hemostasis via figure of 8 2-0 Ethibond suture through a 3-way stopcock    COMPLICATIONS:  - None    RECOMMENDATIONS:  - 6 hours bedrest  - Suture out at 6PM   - CXR PA/Lateral in AM    Please refer to the full report to follow in [facility] [place] for a detailed description of this case.    --  Umair Walker MD  Electrophysiology PGY8

## 2024-02-05 NOTE — PROGRESS NOTE ADULT - ASSESSMENT
78M w PMH Crohn's, AFib/Flutter s/p DCCVs on amiodarone, remote ileocectomy and open appendectomy. Admitted (6/23) for SBO vs Crohns flare, s/p NGT decompression and s/p lap converted to open TRE, SBR x 3, left in discontinuity with abthera vac on (6/27), RTOR for ileocolic resection, small bowel anastomosis, and abdominal wall closure on (6/28), c/b fluid collection s/p IR aspiration of perihepatic fluid on (7/3), c/b wound dehiscence s/p RTOR exlap, washout, ileocolic resection with end ileostomy, blow hole colostomy, red rubber from ileostomy to small bowel anastomosis; vicryl bridging mesh on (7/5) transferred to SICU postoperatively for hemodynamic monitoring, with hospital course complicated by periods of severe ELVI and hyponatremia, which resolved but stepped back up for to SICU on (9/10) for acute AMS, intermittent hypoglycemia, AFib with RVR. Percutaneous Cholecystostomy placed on (9/11) for enlarged GB, PCT capped 10/5 and uncapped 10/25 for hyperbilirubinemia, Right brachial DVT, left basillic and cephalic superficial thrombus on duplex 11/2, CT 11/14 with ALDEN ground glass opacity of unclear etiology, completed empiric 7day cefepime course 11/22, and another course of 7day cefepime for PNA  (1/15-23), hyperbilirubinemia of unclear etiology, resolved candida glabrata fungemia, ELVI resolved.     -EP for pacemaker  -care per SICU

## 2024-02-05 NOTE — PRE-ANESTHESIA EVALUATION ADULT - NSANTHPMHFT_GEN_ALL_CORE
78M w PMH Crohn's, AFib/Flutter s/p DCCVs on amiodarone, remote ileocectomy and open appendectomy. Admitted (6/23) for SBO vs Crohns flare, s/p NGT decompression and s/p lap converted to open TRE, SBR x 3, left in discontinuity with abthera vac on (6/27), RTOR for ileocolic resection, small bowel anastomosis, and abdominal wall closure on (6/28), c/b fluid collection s/p IR aspiration of perihepatic fluid on (7/3), c/b wound dehiscence s/p RTOR exlap, washout, ileocolic resection with end ileostomy, blow hole colostomy, red rubber from ileostomy to small bowel anastomosis; vicryl bridging mesh on (7/5) transferred to SICU postoperatively for hemodynamic monitoring, with hospital course complicated by periods of severe ELVI and hyponatremia, which resolved but stepped back up for to SICU on (9/10) for acute AMS, intermittent hypoglycemia, AFib with RVR. Percutaneous Cholecystostomy placed on (9/11) for enlarged GB, PCT capped 10/5 and uncapped 10/25 for hyperbilirubinemia, Right brachial DVT, left basillic and cephalic superficial thrombus on duplex 11/2, CT 11/14 with ALDEN ground glass opacity of unclear etiology, completed empiric 7day cefepime course 11/22, and another course of 7day cefepime for PNA  (1/15-23), hyperbilirubinemia of unclear etiology, resolved candida glabrata fungemia, ELVI resolved.
Reviewed. HTN, HLD, Crohn's , gout, AFib/flutter on amiodarone, hypothyroid, Right pneumothorax s/p pigtail
78M w/ PMHx of Crohn's, HTN, HLD, AFib/Flutter s/p multiple DCCV (on Amio), Gout, PSHx R IHR, ileocecectomy, open appy presents for abdominal pain  Pt was sent for a CT scan as an outpatient that showed distended loops of bowel with a possible transition point in the RLQ w/ fecalization of bowel contents.      PMHx: HTN, Atrial fibrillation s/p multiple DCCV, Crohn's disease, HLD, Hypothyroidism  PSHx: Ileocecectomy 30 years ago, open appy, H/O knee surgery, History of cataract surgery  Family Hx: Denies family hx of IBS, Crohn's, UC, or colon cancer.  Last Cscope: 6 months ago wnl  Last EGD: 6 months ago wnl  All: penicillin (Angioedema)    Meds: allopurinol 300 mg oral tablet: 1 tab(s) orally once a day  amiodarone 200 mg oral tablet: 1 orally once a day  metoprolol tartrate 50 mg oral tablet: 1 orally 2 times a day  rosuvastatin 5 mg oral tablet: 1 tab(s) orally once a day  Synthroid 50 mcg (0.05 mg) oral tablet: 1 tab(s) orally once a day  venlafaxine 150 mg oral tablet, extended release: 1 tab(s) orally 2 times a day    6/27: Laparoscopic lysis of adhesions, converted to open lysis of adhesions, SBR x 3, temporary abdominal closure, 5L cryst, 1L 5% alb, 3 pRBC, 1 FFP, 1 plts  6/28: ex lap, removal of abthera, ileocolic resection, small bowel anastamosis, , 1.6 crystalloid, 250 5%, 175 uop   7/3: IR Gendel drained perihepatic aspiration of serous fluid.   7/5: RTOR exlap, washout, ileocolic resection with end ileostomy, blow hole colostomy, fistula, red rubber from ileostomy to small bowel anastomosis; vicryl bridging mesh; R JOYCE below ileostomy, L JOYCE at small bowel enterotomy repair; 500 LR, 500 5% albumin, 3u PRBC, 2 FFP, 400 UOP,

## 2024-02-06 NOTE — ADVANCED PRACTICE NURSE CONSULT - ASSESSMENT
New fistula/wound manager applied with patient's spouse at bedside. After old appliance removed, wound bed began to bleed in 3 places and patient's spouse participated in applying pressure, using silver nitrate sticks, and using epinephrine soaks to stop bleeding. Coloplast #[id number] wound manager applied over wound bed with fistula after bleeding stopped. Stoma rings and stoma paste applied to periwound and Cavilon barrier wipes for skin protection all placed prior to applying wound manager. Effluent green and thick, ileostomy site covered with Coloplast 1 piece convex appliance and stoma rings at peristomal area.

## 2024-02-06 NOTE — CHART NOTE - NSCHARTNOTEFT_GEN_A_CORE
Admitting Diagnosis:   Patient is a 78y old  Male who presents with a chief complaint of SBO (2024 14:41)      PAST MEDICAL & SURGICAL HISTORY:  Essential hypertension  Hypertension      Atrial fibrillation  s/p cardioversion  and   Pt. reports 4 DCCV  Now on Amiodarone 200mg PO bid and Eliquis 5mg PO bid  Last DCCV 4 yrs ago at [facility]      Crohn's disease  s/p partial resection of ileum      Hyperlipidemia      Hypothyroidism      History of depression  On Venlafaxine ER 150mg PO bid      Junctional rhythm      Bradycardia      H/O knee surgery      History of cataract surgery          Current Nutrition Order: TPN via PICC- 1.6L bag running over 12hrs [@80ml/hr], providing 250g Dex, 112g AA, 50g SMOF lipids daily; provides 1798 kcals, 112g protein daily, GIR 3.62 mg/kg/min  PO- Regular diet + 2 LPS per day [provide 100 kcals, 15g protein each], + Ensure Max daily [150 kcals, 20g protein]    PO Intake: Good (%) [   ]  Fair (50-75%) [ X ] Poor (<25%) [   ]    GI Issues:   24: ileostomy output 0cc x 24hrs, abdominal wound/fistula output 425cc x 24hrs, per cony output 0cc x 24hrs [capped , uncapped 1/15, recapped ]  24: ileostomy output 5cc x 24hrs, abdominal wound/fistula output 1175cc x 24hrs, per cony output 0cc x 24hrs [capped , uncapped 1/15, recapped ]  24: ileostomy output 25cc x 24hrs, abdominal wound/fistula output 1925cc x 24hrs, per cony output 0cc x 24hrs [capped , uncapped 1/15, recapped ]  24: ileostomy output 25cc x 24hrs, abdominal wound/fistula output 1700cc x 24hrs, per cony output 0cc x 24hrs [capped , uncapped 1/15, recapped ]  24: ileostomy output 60cc x 24hrs, abdominal wound/fistula output 1375cc x 24hrs, per cony output 1025cc x 24hrs [capped , uncapped 1/15]  24: ileostomy output 0cc x 24hrs, abdominal wound/fistula output 1750cc x 24hrs, per cony output 0cc x 24hrs [capped ]  24: noted brownish output from [id number], total amount not documented; abdominal wound/fistula output 2285cc x 24hrs, per cony output 0cc x 24hrs [capped ]  1/3/24:  ileostomy output 0cc x 24hrs, abdominal wound/fistula output 2550cc x 24hrs, per cony output 275cc x 24hrs  : ileostomy output 50cc x 24hrs, abdominal wound/fistula output 910cc x 24hrs, per cony output 1225cc x 24hrs  : ileostomy output 50cc x 24hrs, abdominal wound/fistula output 1175cc x 24hrs, per cony output 1050cc x 24hrs  : ileostomy output 50cc x 24hrs, abdominal wound/fistula output 1100cc x 24hrs, per cony output 1050cc x 24hrs     : ileostomy output 20cc x 24hrs, abdominal wound/fistula output 1275cc x 24hrs, per cony output 775cc x 24hrs    : ileostomy output 15cc x 24hrs, abdominal wound/fistula output  1230cc x 24hrs, per cony output 1010cc x 24hrs    : ileostomy output 75cc x 24hrs, abdominal wound/fistula output  600cc x 24hrs, per cony output 1125cc x 24hrs    : ileostomy output 35cc x 24hrs, abdominal wound/fistula output  250cc x 24hrs, per cony output 745cc x 24hrs    : ileostomy output 30cc x 24hrs, abdominal wound/fistula output  2400cc x 24hrs, per cony output 550cc x 24hrs   : ileostomy output 50 cc x 24hrs, abdominal wound/fistula output 2175 cc x 24hrs, per cony output 530 cc x 24hrs   : ileostomy output 25cc x 24hrs, abdominal wound/fistula output 2350 cc x 24hrs, per cony output 775cc x 24hrs       Pain: no pain/discomfort note    Skin Integrity: generalized jaundice, abd wound and fistula with wound manager in place, RLQ ileostomy,  perc cony drain [now capped]. Rudy score 19    I&Os:   24 @ 07:01  -  24 @ 07:00  --------------------------------------------------------  IN: 2073.6 mL / OUT: 2150 mL / NET: -76.4 mL    24 @ 07:01  -  24 @ 13:08  --------------------------------------------------------  IN: 240 mL / OUT: 800 mL / NET: -560 mL        Labs:       137  |  103  |  44<H>  ----------------------------<  117<H>  4.3   |  28  |  1.14    Ca    7.8<L>      2024 05:30  Phos  3.7       Mg     2.1         TPro  8.3  /  Alb  2.3<L>  /  TBili  4.2<H>  /  DBili  x   /  AST  105<H>  /  ALT  58<H>  /  AlkPhos  122<H>      CAPILLARY BLOOD GLUCOSE          Medications:  MEDICATIONS  (STANDING):  chlorhexidine 2% Cloths 1 Application(s) Topical <User Schedule>  cholestyramine Powder (Sugar-Free) 4 Gram(s) Oral daily  enoxaparin Injectable 40 milliGRAM(s) SubCutaneous every 24 hours  epoetin matt-epbx (RETACRIT) Injectable 11709 Unit(s) SubCutaneous every 7 days  ergocalciferol 92833 Unit(s) Oral <User Schedule>  gabapentin 100 milliGRAM(s) Oral at bedtime  glucagon  Injectable 1 milliGRAM(s) IntraMuscular once  levothyroxine 37.5 MICROGram(s) Oral daily  lipid, fat emulsion (Fish Oil and Plant Based) 20% Infusion 0.54 Gm/kG/Day (20.83 mL/Hr) IV Continuous <Continuous>  LORazepam     Tablet 1 milliGRAM(s) Oral at bedtime  metoprolol tartrate 50 milliGRAM(s) Oral every 12 hours  Parenteral Nutrition - Adult 1 Each TPN Continuous <Continuous>  Parenteral Nutrition - Adult 1 Each TPN Continuous <Continuous>  teduglutide Injectable 4.7 milliGRAM(s) SubCutaneous every 24 hours  timolol 0.5% Solution 1 Drop(s) Both EYES daily  ursodiol Suspension 300 milliGRAM(s) Oral every 8 hours  venlafaxine XR. 225 milliGRAM(s) Oral daily    MEDICATIONS  (PRN):  chlorhexidine 0.12% Liquid 15 milliLiter(s) Swish and Spit two times a day PRN oral hygeine  EPINEPHrine   1 mG/mL (1:1,000) Topical Solution 1 Application(s) Topical every 24 hours PRN for wound bleeding  LORazepam     Tablet 0.25 milliGRAM(s) Oral every 12 hours PRN Anxiety  melatonin 5 milliGRAM(s) Oral at bedtime PRN Insomnia  silver nitrate Applicator 1 Application(s) Topical once PRN wound edge bleeding    Daily Weight in k.9kg [2024]  Daily 96kg [2024]  Daily 99.1kg [2024]   Daily 94kg [2024]  Daily 93.3kg [19 Dec 2023]  Daily 93.3kg [11 Dec 2023]  Daily 93.3kg [08 Dec 2023]  Daily 93.3kg [6 Dec 2023]  Daily 94.2kg [2023]  Daily 94.4kg [2023]  Daily 94.2kg [2023]  Daily 94.2kg [2023]  Daily 94.2kg [2023]  Daily 94.2 [15 Nov 2023]  Daily 94.2kg [2023]  Daily 95.8kg [2023]  Daily 94.2kg [2023]  Daily 85.2kg [2023]  Daily 85.2kg [31 Oct 2023]  Daily 85.2kg [24 Oct 2023]  Daily 85.2kg [20 Oct 2023]  Daily 81.9kg [18 Oct 2023]  Daily 85.2kg [16 Oct 2023]  Daily   95.2kg [12 Oct 2023]   Daily 87.7kg [11 Oct 2023]  Daily 87.4kg [09 Oct 2023]  Daily 86.4kg [07 Oct 2023]  Daily 82.5kg [06 Oct 2023]  Daily 82.6kg [4 Oct 2023]   Daily 83.5kg [03 Oct 2023]  Daily 84.5kg [2 Oct 2023]  Daily 87.2kg [1 Oct 2023]  Daily 88.3kg [29 Sep 2023]  Daily 89.9kg [28 Sep 2023]  Daily 87.7kg [24 Sep 2023]  Daily 90.4kg [21 Sep 2023]  Daily 91.4kg [20 Sep 2023]  Daily 86.7kg [18 Sep 2023]  Daily 88.1kg [16 Sep 2023]  Daily 88.9kg [15 Sep 2023]   Daily 89.1 [14 Sep 2023]  Daily 92.9kg [6 Sep 2023]  Daily 91.8kg [27 Aug 2023]  Daily 101kg [9 Aug 2023]     Weight Change:  weight previously trending down, then up- now appears to be stable x ~2 months, recent weight gain likely secondary to increase in fluid provision/edema. Recommend continue weekly/daily weights for trending & ensuring adequacy of nutrition provision    IBW: 86.4kg  % IBW: 111.1%    Estimated energy needs:   Ideal body weight (86.4kg) used for calculations as pt >100% IBW and BMI <30 per [facility] Standards of Care. Needs estimated for age and adjusted for current clinical status, increased needs for post-op & open abd wound healing    Calories: 9950-6435 kcals based on 25-35 kcals/kg  Protein: 129.6-172.8 g protein based on 1.5-2.0g protein/kg + additional depending on outputs  *Fluid needs per team    Subjective:   78M w PMH Crohn's, AFib/Flutter s/p DCCVs on amiodarone, remote ileocectomy and open appendectomy. Admitted () for SBO vs Crohns flare, s/p NGT decompression and s/p lap converted to open TRE, SBR x 3, left in discontinuity with abthera vac on (), RTOR for ileocolic resection, small bowel anastomosis, and abdominal wall closure on (), c/b fluid collection s/p IR aspiration of perihepatic fluid on (7/3), c/b wound dehiscence s/p RTOR exlap, washout, ileocolic resection with end ileostomy, blow hole colostomy, red rubber from ileostomy to small bowel anastomosis; vicryl bridging mesh on () transferred to SICU postoperatively for hemodynamic monitoring, with hospital course complicated by periods of severe ELVI and hyponatremia, which resolved but stepped back up for to SICU on (9/10) for acute AMS, intermittent hypoglycemia, AFib with RVR. Percutaneous Cholecystostomy placed on () for enlarged GB, PCT capped 10/5 and uncapped 10/25 for hyperbilirubinemia, Right brachial DVT, left basillic and cephalic superficial thrombus on duplex , CT  with ALDEN ground glass opacity of unclear etiology, completed empiric 7day cefepime course , and another course of 7day cefepime for PNA  (1/15-). rising T-bili of unclear etiology, resolved candida glabrata fungemia, ELVI improving.     Chart reviewed. Pt seen at bedside on  with IDT today during AM rounds. 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 Gattex initiated , plan to monitor weights, outputs, wound healing, labs for absorption & adjust TPN provision prn to prevent over/underfeeding Noted with some stool in appliance [? increasing absorption]. Discussed with team, TPN provision decreased to 80% of needs as pt with >0% absorption of PO intake. Also able to decrease time of TPN given lower amount of substrates in bag- goal for improvement in LFTs, will continue to monitor. RDN spoke with RN at home infusion company & contact information provided if needed when pt discharged. Labs reviewed- BUN 44 <H>, Creat WNL [monitor hydration], total bilirubin 4.2 <H> [downtrending], alk phos 122 <H>,  <H>, ALT 58 <H>, direct bilirubin 3.0 <H>, indirect bilirubin 1.6 <H>. Meds- synthriod, gattex, 50,000 units ergocalciferol, EPO, ursodiol, cholestyramine. RDN will continue to monitor, reassess, and intervene as appropriate.     Previous Nutrition Diagnosis:  1. Increased Nutrient Needs R/T physiological demands for nutrient AEB post-op wound healing  Active [ X ]  Resolved [   ]    2. Altered GI function R/T extensive GI history, insufficient bowel, high output fistula AEB supplemental PN requirement to meet nutrition needs  Active [ X  ]  Resolved [   ]    If resolved, new PES:     Goal:  Pt will meet at least 75% of protein & energy needs via most appropriate route for nutrition     Recommendations:  1. c/w TPN via PICC, 1.6L over 12 hrs - 250g Dex, 112g protein, 50g SMOF lipids; provides 1798 kcals, 112g protein, GIR 3.62 mg/kg/min [20.8 kcals/kg, 15.6 non-protein kcals/kg, 1.3g protein/kg IBW]  - provides ~83% low end kcal needs & 86.4% low end of protein needs  - fluids & lytes per team  - MVI, thiamine, vit C in bag, increase selenium as able  - c/w zinc 3x per week, copper & selenium in bag-- continue checking monthly  - close monitoring of weights, fistula outputs, labs, wound healing, urine outputs & consider UUN & fecal fat studies to monitor changes in PO absorption --- adjust substrates in TPN bag as indicated to prevent over/underfeeding  2. c/w Regular diet as medically feasible to allow for more menu options  - c/w 1 LPS BID [200 kcals, 30g protein] + 1 Ensure Max daily [150 kcals, 30g protein] as amenable  3. Weekly lipid panel while on TPN   - hold/decrease lipids if TG >400  - continue to trend LFTs  4. c/w Vit D supplementation, recheck levels in 4 weeks-- next   5. Recommend weekly weights for trending/ensuring adequacy of nutrition provision  6. Hydration per team  - risk for dehydration with high outputs, additional fluids prn  7. Monitor BMP/Mg/Phos, POC BG while on TPN  - monitor & replenish lytes outside TPN bag prn  8. Continue close monitoring of clinical course & adjust recommendations prn    Education: ongoing education prn    Risk Level: High [ X  ] Moderate [   ] Low [   ]

## 2024-02-06 NOTE — PROGRESS NOTE ADULT - ASSESSMENT
78M w PMH Crohn's, AFib/Flutter s/p DCCVs on amiodarone, remote ileocectomy and open appendectomy. Admitted (6/23) for SBO vs Crohns flare, s/p NGT decompression and s/p lap converted to open TRE, SBR x 3, left in discontinuity with abthera vac on (6/27), RTOR for ileocolic resection, small bowel anastomosis, and abdominal wall closure on (6/28), c/b fluid collection s/p IR aspiration of perihepatic fluid on (7/3), c/b wound dehiscence s/p RTOR exlap, washout, ileocolic resection with end ileostomy, blow hole colostomy, red rubber from ileostomy to small bowel anastomosis; vicryl bridging mesh on (7/5) transferred to SICU postoperatively for hemodynamic monitoring, with hospital course complicated by periods of severe ELVI and hyponatremia, which resolved but stepped back up for to SICU on (9/10) for acute AMS, intermittent hypoglycemia, AFib with RVR. Percutaneous Cholecystostomy placed on (9/11) for enlarged GB, PCT capped 10/5 and uncapped 10/25 for hyperbilirubinemia, Right brachial DVT, left basillic and cephalic superficial thrombus on duplex 11/2, CT 11/14 with ALDEN ground glass opacity of unclear etiology, completed empiric 7day cefepime course 11/22, and another course of 7day cefepime for PNA  (1/15-23), hyperbilirubinemia of unclear etiology, resolved candida glabrata fungemia, ELVI resolved, second episode of sinus pause--pacemaker placed (2/5).    NEURO: Hx of depression: cont Effexor 150 QD.  Anxiety: Lorazepam PRN. Holistic consult for anxiety and insomnia. Gabapentin for restless leg syndrome.   HEENT: Timolol eye gtt added on 1/10 for additional systemic BB support in setting of malabsorption  CV:Fluid overload resolved.  Hx of Afib and recent Aflutter alternating with sinus tachycardia (HR 100s): Changed to po Lopressor 50 q12. Cont timolol eye ggt for attempted systemic affect. Cont Lovenox. S/p previous DCCV, off Amiodarone and Lipitor due to persistent transaminitis. BLE duplex (1/5) no DVT, TTE  (7/18) - PASP 64mmHg, EF 65-70%. Holding Losartan & norvasc, TTE (1/4) LVEF 75%, mild/mod AR, PASP 40, RVEF wnl. S/p PPM 2/5.   PULM: Atelectasis/dyspnea improved clinically: cont room air, will dc Bipap. PRN duonebs for wheezing. Encourage OOB and IS.   GI/FEN: Low res, low fat, high protein diet, Ensure Max. 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 Continue TPN/lipids (now running over 12 hours), Cont Octreotide & Cholestyramine 10/18. Transaminitis slowly improved, elevated T bili, s/p Perc Deb on 9/11, uncapped on 1/15, recap on (1/17). Seen by Hepatology - MRCP 10/30 no obstruction, cont ursodiol, RUQ US 11/25 CBD 5mm, hepatic vessels patent. Cont Vit D weekly.   : Voids. ELVI improved: stopped famotidine given renal dysfunction. MARLO Duplex and RP US unremarkable, CK wnl.    ENDO: Hx hypothyroid: Cont Synthroid,  Repeat thyroid studies WNL 1/3/24.   ID: currently not on any abx. Completed cefepime for PNA (1/15-23) BCx 11/22 grew candida glabrata, likely CLABSI. s/p Caspo & completed Fluconazole course (12/1-12/9). Ophthalmology evaluated - normal ocular exam. Echo no vegetation. PICC replaced on 12/4. Cont Nystatin powder. // DC: caspofungin (11/24-12/1). empiric 7days cefepime (11/15-11/22), C. tertium, Lactobacillus from IR cx 7/3, and candida albicans, lactobacillus, vanc sensitive E faecium, vanc resistant E gallinarum, vanc resistant E casseliflavus, lactobacillus from OR cx 7/5. Completed course of abx with Imipenem (9/10-9/15). Imipenem (8/26--) imipenem (6/30-7/12, 7/23-7/30), Dapto (6/30-7/5 and 7/23-7/24). Empiric Dapto (8/23-24) and Cefepime (8/23-24).   PPX: SCDs, currently on PPX lovenox given hx of thrombosis.   HEME: Anemia - continue Epogen weekly. Previously treated with Iron and intermittent transfusions.   LINES: PIVs, RUE PICC placed (1/5--) will plan to switch PICC once a month in setting of fungemia history, next switch will be around 2/5 // DC: LUE PICC (9/1-11/1 ), RUE PICC: (11/1-11/24), LUE PICC (12/4-1/5)  WOUNDS/DRAINS: Abdominal wound and fistula with wound manager in place dressing change daily. Had recurrent punctate bleeding at wound bed, s/p placed surgicel, attempt to stitch, electrocautery and epi soaked gauze. Cont epinephrine soaked gauze at bedside as needed for wound bleeding. RLQ Ostomy. IR perc deb tube recapped 1/17, IR to do tube study tomorrow eval if cystic duct open to decide on removal vs exchange.  // DC: L Clay drain.  PT: Ambulate as tolerated OOB to chair daily. Repeat PT consult orderd on 2/2.   DISPO: SICU due to complicated hospital course. Cont Labs every other day. Patient will need a Semi-electric hospital bed and gel overlay and side rails for home use. Patients head needs to be elevated 30 degrees to prevent aspirations and wedges and pillows have been tried and failed. Patient is unable to make frequent changes to body position on their own. The member can independently affect the adjustment by operating the controls. Length of need is 36 months. will need rolling walker.

## 2024-02-06 NOTE — PROGRESS NOTE ADULT - SUBJECTIVE AND OBJECTIVE BOX
S: No new issues/events overnight, no new med c/o    O: ICU Vital Signs Last 24 Hrs  T(F): 97.8 (02-06-24 @ 08:24), Max: 98.1 (02-06-24 @ 00:55)  HR: 90 (02-06-24 @ 14:05) (89 - 92)  BP: 160/77 (02-06-24 @ 14:05) (112/58 - 171/66)  BP(mean): 111 (02-06-24 @ 14:05) (81 - 111)  ABP: --  RR: 24 (02-06-24 @ 14:05) (19 - 30)  SpO2: 93% (02-06-24 @ 14:05) (93% - 100%)    PHYSICAL EXAM:   Neurological: AAOx3, CNII-XII intact,  strength 5/5 b/l  ENT: mucus membrane moist  Cardiovascular: RRR  Respiratory: CTA  Gastrointestinal: soft, NT, ND, BS+, no bleeding from ileostomy, nor fistula. perc cony capped.   Extremities: warm, dependent edema improved compared with yesterday exam.   Vascular: no cyanosis/erythema  Skin: no rashes  MSK: no joint swelling.       LABS:    02-05    137  |  103  |  44<H>  ----------------------------<  117<H>  4.3   |  28  |  1.14    Ca    7.8<L>      05 Feb 2024 05:30  Phos  3.7     02-05  Mg     2.1     02-05    TPro  8.3  /  Alb  2.3<L>  /  TBili  4.2<H>  /  DBili  x   /  AST  105<H>  /  ALT  58<H>  /  AlkPhos  122<H>  02-05  LIVER FUNCTIONS - ( 05 Feb 2024 05:30 )  Alb: 2.3 g/dL / Pro: 8.3 g/dL / ALK PHOS: 122 U/L / ALT: 58 U/L / AST: 105 U/L / GGT: x                               8.2    8.90  )-----------( 168      ( 05 Feb 2024 05:30 )             25.3   PT/INR - ( 05 Feb 2024 05:30 )   PT: 14.8 sec;   INR: 1.31          PTT - ( 05 Feb 2024 05:30 )  PTT:35.2 sec  Urinalysis Basic - ( 05 Feb 2024 05:30 )    Color: x / Appearance: x / SG: x / pH: x  Gluc: 117 mg/dL / Ketone: x  / Bili: x / Urobili: x   Blood: x / Protein: x / Nitrite: x   Leuk Esterase: x / RBC: x / WBC x   Sq Epi: x / Non Sq Epi: x / Bacteria: x    CAPILLARY BLOOD GLUCOSE        MEDICATIONS  (STANDING):  chlorhexidine 2% Cloths 1 Application(s) Topical <User Schedule>  cholestyramine Powder (Sugar-Free) 4 Gram(s) Oral daily  enoxaparin Injectable 40 milliGRAM(s) SubCutaneous every 24 hours  epoetin matt-epbx (RETACRIT) Injectable 72314 Unit(s) SubCutaneous every 7 days  ergocalciferol 69904 Unit(s) Oral <User Schedule>  gabapentin 100 milliGRAM(s) Oral at bedtime  glucagon  Injectable 1 milliGRAM(s) IntraMuscular once  levothyroxine 37.5 MICROGram(s) Oral daily  lipid, fat emulsion (Fish Oil and Plant Based) 20% Infusion 0.54 Gm/kG/Day (20.83 mL/Hr) IV Continuous <Continuous>  LORazepam     Tablet 1 milliGRAM(s) Oral at bedtime  metoprolol tartrate 50 milliGRAM(s) Oral every 12 hours  Parenteral Nutrition - Adult 1 Each TPN Continuous <Continuous>  Parenteral Nutrition - Adult 1 Each TPN Continuous <Continuous>  teduglutide Injectable 4.7 milliGRAM(s) SubCutaneous every 24 hours  timolol 0.5% Solution 1 Drop(s) Both EYES daily  ursodiol Suspension 300 milliGRAM(s) Oral every 8 hours  venlafaxine XR. 225 milliGRAM(s) Oral daily    MEDICATIONS  (PRN):  chlorhexidine 0.12% Liquid 15 milliLiter(s) Swish and Spit two times a day PRN oral hygeine  EPINEPHrine   1 mG/mL (1:1,000) Topical Solution 1 Application(s) Topical every 24 hours PRN for wound bleeding  LORazepam     Tablet 0.25 milliGRAM(s) Oral every 12 hours PRN Anxiety  melatonin 5 milliGRAM(s) Oral at bedtime PRN Insomnia  silver nitrate Applicator 1 Application(s) Topical once PRN wound edge bleeding      Poole:	  [x ] None	[ ] Daily Poole Order Placed	   Indication:	  [ ] Strict I and O's    [ ] Obstruction     [ ] Incontinence + Stage 3 or 4 Decubitus  Central Line:  [ ] None	   [ x]  Medication / TPN Administration     [ ] No Peripheral IV

## 2024-02-06 NOTE — PROGRESS NOTE ADULT - SUBJECTIVE AND OBJECTIVE BOX
SUBJECTIVE: Patient seen and examined at bedside. Patient states that he is feeling well with no acute complaints this morning. He is tolerating his diet and denies nausea or vomiting overnight.      enoxaparin Injectable 40 milliGRAM(s) SubCutaneous every 24 hours  metoprolol tartrate 50 milliGRAM(s) Oral every 12 hours      Vital Signs Last 24 Hrs  T(C): 36.6 (06 Feb 2024 08:24), Max: 36.9 (05 Feb 2024 13:24)  T(F): 97.8 (06 Feb 2024 08:24), Max: 98.4 (05 Feb 2024 13:24)  HR: 89 (06 Feb 2024 12:00) (89 - 92)  BP: 151/72 (06 Feb 2024 12:00) (112/58 - 171/66)  BP(mean): 104 (06 Feb 2024 12:00) (81 - 104)  RR: 23 (06 Feb 2024 12:00) (19 - 30)  SpO2: 98% (06 Feb 2024 12:00) (93% - 98%)    Parameters below as of 06 Feb 2024 12:00  Patient On (Oxygen Delivery Method): room air      I&O's Detail    05 Feb 2024 07:01  -  06 Feb 2024 07:00  --------------------------------------------------------  IN:    Fat Emulsion (Fish Oil &amp; Plant Based) 20% Infusion: 249.6 mL    Oral Fluid: 60 mL    TPN (Total Parenteral Nutrition): 1764 mL  Total IN: 2073.6 mL    OUT:    Drain (mL): 425 mL    Voided (mL): 1725 mL  Total OUT: 2150 mL    Total NET: -76.4 mL      06 Feb 2024 07:01  -  06 Feb 2024 13:09  --------------------------------------------------------  IN:    Oral Fluid: 240 mL  Total IN: 240 mL    OUT:    Drain (mL): 400 mL    TPN (Total Parenteral Nutrition): 0 mL    Voided (mL): 400 mL  Total OUT: 800 mL    Total NET: -560 mL          General: NAD, resting comfortably in bed  C/V: NSR  Pulm: Nonlabored breathing, no respiratory distress  Abd: soft, NT/ND, EAF with wound manager in place, ileostomy with no signs of bleeding  Extrem: WWP, no edema, SCDs in place      LABS:                        8.2    8.90  )-----------( 168      ( 05 Feb 2024 05:30 )             25.3     02-05    137  |  103  |  44<H>  ----------------------------<  117<H>  4.3   |  28  |  1.14    Ca    7.8<L>      05 Feb 2024 05:30  Phos  3.7     02-05  Mg     2.1     02-05    TPro  8.3  /  Alb  2.3<L>  /  TBili  4.2<H>  /  DBili  x   /  AST  105<H>  /  ALT  58<H>  /  AlkPhos  122<H>  02-05    PT/INR - ( 05 Feb 2024 05:30 )   PT: 14.8 sec;   INR: 1.31          PTT - ( 05 Feb 2024 05:30 )  PTT:35.2 sec  Urinalysis Basic - ( 05 Feb 2024 05:30 )    Color: x / Appearance: x / SG: x / pH: x  Gluc: 117 mg/dL / Ketone: x  / Bili: x / Urobili: x   Blood: x / Protein: x / Nitrite: x   Leuk Esterase: x / RBC: x / WBC x   Sq Epi: x / Non Sq Epi: x / Bacteria: x        RADIOLOGY & ADDITIONAL STUDIES:

## 2024-02-06 NOTE — PROGRESS NOTE ADULT - SUBJECTIVE AND OBJECTIVE BOX
INTERVAL EVENTS: S/p uneventful Micra placement yesterday; No o/n events. Denies CP, dyspnea, palpitations, presyncope, syncope, f/c/n/v.     REVIEW OF SYSTEMS:  Constitutional:     [X] negative [ ] fevers [ ] chills [ ] weight loss [ ] weight gain  HEENT:                  [X] negative [ ] dry eyes [ ] eye irritation [ ] postnasal drip [ ] nasal congestion  CV:                         [X] negative  [ ] chest pain [ ] orthopnea [ ] palpitations [ ] murmur  Resp:                     [X] negative [ ] cough [ ] shortness of breath [ ] wheezing [ ] sputum [ ] hemoptysis  GI:                          [X] negative [ ] nausea [ ] vomiting [ ] diarrhea [ ] constipation [ ] abd pain [ ] dysphagia   :                        [X] negative [ ] dysuria [ ] nocturia [ ] hematuria [ ] increased urinary frequency  MSK:                      [X] negative [ ] back pain [ ] myalgias [ ] arthralgias [ ] fracture  Skin:                       [X] negative [ ] rash [ ] itch  Neuro:                   [X] negative [ ] headache [ ] dizziness [ ] syncope [ ] weakness [ ] numbness  Psych:                    [X] negative [ ] anxiety [ ] depression  Endo:                     [X] negative [ ] diabetes [ ] thyroid problem  Heme/Lymph:      [X] negative [ ] anemia [ ] bleeding problem  Allergic/Immune: [X] negative [ ] itchy eyes [ ] nasal discharge [ ] hives [ ] angioedema    [X] All other systems negative or otherwise described above.  [ ] Unable to assess ROS due to ________.    PAST MEDICAL & SURGICAL HISTORY:  Essential hypertension  Hypertension    Atrial fibrillation  s/p cardioversion 2010 and 2014  Pt. reports 4 DCCV  Now on Amiodarone 200mg PO bid and Eliquis 5mg PO bid  Last DCCV 4 yrs ago at [facility]    Crohn's disease  s/p partial resection of ileum    Hyperlipidemia    Hypothyroidism    ELVI (acute kidney injury)  No hx of CKD   Creatinine 1.5 on this admisison 4/5/22    History of depression  On Venlafaxine ER 150mg PO bid    Junctional rhythm    Bradycardia    Other specified personal history presenting hazards to health  History of bowel resection    H/O knee surgery    History of cataract surgery      MEDICATIONS  (STANDING):  chlorhexidine 2% Cloths 1 Application(s) Topical <User Schedule>  cholestyramine Powder (Sugar-Free) 4 Gram(s) Oral daily  enoxaparin Injectable 40 milliGRAM(s) SubCutaneous every 24 hours  epoetin matt-epbx (RETACRIT) Injectable 30905 Unit(s) SubCutaneous every 7 days  ergocalciferol 10050 Unit(s) Oral <User Schedule>  gabapentin 100 milliGRAM(s) Oral at bedtime  glucagon  Injectable 1 milliGRAM(s) IntraMuscular once  levothyroxine 37.5 MICROGram(s) Oral daily  lipid, fat emulsion (Fish Oil and Plant Based) 20% Infusion 0.54 Gm/kG/Day (20.83 mL/Hr) IV Continuous <Continuous>  LORazepam     Tablet 1 milliGRAM(s) Oral at bedtime  metoprolol tartrate 50 milliGRAM(s) Oral every 12 hours  Parenteral Nutrition - Adult 1 Each TPN Continuous <Continuous>  Parenteral Nutrition - Adult 1 Each TPN Continuous <Continuous>  teduglutide Injectable 4.7 milliGRAM(s) SubCutaneous every 24 hours  timolol 0.5% Solution 1 Drop(s) Both EYES daily  ursodiol Suspension 300 milliGRAM(s) Oral every 8 hours  venlafaxine XR. 225 milliGRAM(s) Oral daily    MEDICATIONS  (PRN):  chlorhexidine 0.12% Liquid 15 milliLiter(s) Swish and Spit two times a day PRN oral hygeine  EPINEPHrine   1 mG/mL (1:1,000) Topical Solution 1 Application(s) Topical every 24 hours PRN for wound bleeding  LORazepam     Tablet 0.25 milliGRAM(s) Oral every 12 hours PRN Anxiety  melatonin 5 milliGRAM(s) Oral at bedtime PRN Insomnia  silver nitrate Applicator 1 Application(s) Topical once PRN wound edge bleeding    ICU Vital Signs Last 24 Hrs  T(C): 36.6 (06 Feb 2024 08:24), Max: 36.9 (05 Feb 2024 13:24)  T(F): 97.8 (06 Feb 2024 08:24), Max: 98.4 (05 Feb 2024 13:24)  HR: 90 (06 Feb 2024 10:00) (90 - 92)  BP: 138/62 (06 Feb 2024 10:00) (108/62 - 171/66)  BP(mean): 89 (06 Feb 2024 10:00) (78 - 103)  ABP: --  ABP(mean): --  RR: 24 (06 Feb 2024 10:00) (19 - 30)  SpO2: 95% (06 Feb 2024 10:00) (92% - 97%)    O2 Parameters below as of 06 Feb 2024 11:00  Patient On (Oxygen Delivery Method): room air          Orthostatic VS    Daily     Daily   I&O's Summary    05 Feb 2024 07:01  -  06 Feb 2024 07:00  --------------------------------------------------------  IN: 2073.6 mL / OUT: 2150 mL / NET: -76.4 mL    06 Feb 2024 07:01  -  06 Feb 2024 11:07  --------------------------------------------------------  IN: 240 mL / OUT: 150 mL / NET: 90 mL        PHYSICAL EXAM:  GENERAL: No acute distress, well-developed  ENT: EOMI, conjunctiva and sclera clear, Neck supple, No JVD, moist mucosa  CHEST/LUNG: Clear to auscultation bilaterally; No wheeze, equal breath sounds bilaterally  HEART: Regular rhythm; No murmurs, rubs, or gallops, radial and DP 2+ b/l, euvolemic  ABDOMEN: Soft, Nontender, Nondistended  EXTREMITIES:  No clubbing, cyanosis, or edema; groin examined with no ecchymosis, induration, significant tenderness, or fluctuance noted  NEUROLOGY: AAOx3, non-focal    LABS:                        8.2    8.90  )-----------( 168      ( 05 Feb 2024 05:30 )             25.3     PT/INR - ( 05 Feb 2024 05:30 )   PT: 14.8 sec;   INR: 1.31          PTT - ( 05 Feb 2024 05:30 )  PTT:35.2 sec  02-05    137  |  103  |  44<H>  ----------------------------<  117<H>  4.3   |  28  |  1.14    Ca    7.8<L>      05 Feb 2024 05:30  Phos  3.7     02-05  Mg     2.1     02-05    TPro  8.3  /  Alb  2.3<L>  /  TBili  4.2<H>  /  DBili  x   /  AST  105<H>  /  ALT  58<H>  /  AlkPhos  122<H>  02-05        Urinalysis Basic - ( 05 Feb 2024 05:30 )    Color: x / Appearance: x / SG: x / pH: x  Gluc: 117 mg/dL / Ketone: x  / Bili: x / Urobili: x   Blood: x / Protein: x / Nitrite: x   Leuk Esterase: x / RBC: x / WBC x   Sq Epi: x / Non Sq Epi: x / Bacteria: x          RADIOLOGY & ADDITIONAL STUDIES:    ECG: Personally reviewed; atrial fibrillation    Telemetry: reviewed; atrial fibrillation    Echo:   < from: TTE Echo Complete w/ Contrast w/ Doppler (01.04.24 @ 10:19) >  CONCLUSIONS:     1. Technically difficult study.   2. Hyperdynamic left ventricular systolic function.   3. Moderate symmetric left ventricular hypertrophy.   4. Grade II left ventriculardiastolic dysfunction.   5. Normal right ventricular size and systolic function.   6. Normal atria.   7. Aortic sclerosis without significant stenosis.   8. Mild-to-moderate aortic regurgitation.   9. Pulmonary artery systolic pressure is 40 mmHg, assuming a right atrial pressure of 8 mmHg.  10. No pericardial effusion.  11. Left pleural effusion.  12. Compared to the previous TTE performed on 11/24/2023, there is mild pulmonary hypertension and TR has increased.    < end of copied text >      Stress Testing: none    Cath: none    CXR: Personally reviewed  < from: Xray Chest 1 View- PORTABLE-Urgent (Xray Chest 1 View- PORTABLE-Urgent .) (01.23.24 @ 21:05) >  IMPRESSION: Pulmonary congestion and pleural effusions, right greater   than left.    --- End of Report ---    < end of copied text >    Other cardiac imaging: none    Other misc imaging: none       INTERVAL EVENTS: S/p uneventful Micra placement yesterday; No o/n events. Denies CP, dyspnea, palpitations, presyncope, syncope, f/c/n/v.     REVIEW OF SYSTEMS:  Constitutional:     [X] negative [ ] fevers [ ] chills [ ] weight loss [ ] weight gain  HEENT:                  [X] negative [ ] dry eyes [ ] eye irritation [ ] postnasal drip [ ] nasal congestion  CV:                         [X] negative  [ ] chest pain [ ] orthopnea [ ] palpitations [ ] murmur  Resp:                     [X] negative [ ] cough [ ] shortness of breath [ ] wheezing [ ] sputum [ ] hemoptysis  GI:                          [X] negative [ ] nausea [ ] vomiting [ ] diarrhea [ ] constipation [ ] abd pain [ ] dysphagia   :                        [X] negative [ ] dysuria [ ] nocturia [ ] hematuria [ ] increased urinary frequency  MSK:                      [X] negative [ ] back pain [ ] myalgias [ ] arthralgias [ ] fracture  Skin:                       [X] negative [ ] rash [ ] itch  Neuro:                   [X] negative [ ] headache [ ] dizziness [ ] syncope [ ] weakness [ ] numbness  Psych:                    [X] negative [ ] anxiety [ ] depression  Endo:                     [X] negative [ ] diabetes [ ] thyroid problem  Heme/Lymph:      [X] negative [ ] anemia [ ] bleeding problem  Allergic/Immune: [X] negative [ ] itchy eyes [ ] nasal discharge [ ] hives [ ] angioedema    [X] All other systems negative or otherwise described above.  [ ] Unable to assess ROS due to ________.    PAST MEDICAL & SURGICAL HISTORY:  Essential hypertension  Hypertension    Atrial fibrillation  s/p cardioversion 2010 and 2014  Pt. reports 4 DCCV  Now on Amiodarone 200mg PO bid and Eliquis 5mg PO bid  Last DCCV 4 yrs ago at [facility]    Crohn's disease  s/p partial resection of ileum    Hyperlipidemia    Hypothyroidism    ELVI (acute kidney injury)  No hx of CKD   Creatinine 1.5 on this admisison 4/5/22    History of depression  On Venlafaxine ER 150mg PO bid    Junctional rhythm    Bradycardia    Other specified personal history presenting hazards to health  History of bowel resection    H/O knee surgery    History of cataract surgery      MEDICATIONS  (STANDING):  chlorhexidine 2% Cloths 1 Application(s) Topical <User Schedule>  cholestyramine Powder (Sugar-Free) 4 Gram(s) Oral daily  enoxaparin Injectable 40 milliGRAM(s) SubCutaneous every 24 hours  epoetin matt-epbx (RETACRIT) Injectable 04356 Unit(s) SubCutaneous every 7 days  ergocalciferol 00297 Unit(s) Oral <User Schedule>  gabapentin 100 milliGRAM(s) Oral at bedtime  glucagon  Injectable 1 milliGRAM(s) IntraMuscular once  levothyroxine 37.5 MICROGram(s) Oral daily  lipid, fat emulsion (Fish Oil and Plant Based) 20% Infusion 0.54 Gm/kG/Day (20.83 mL/Hr) IV Continuous <Continuous>  LORazepam     Tablet 1 milliGRAM(s) Oral at bedtime  metoprolol tartrate 50 milliGRAM(s) Oral every 12 hours  Parenteral Nutrition - Adult 1 Each TPN Continuous <Continuous>  Parenteral Nutrition - Adult 1 Each TPN Continuous <Continuous>  teduglutide Injectable 4.7 milliGRAM(s) SubCutaneous every 24 hours  timolol 0.5% Solution 1 Drop(s) Both EYES daily  ursodiol Suspension 300 milliGRAM(s) Oral every 8 hours  venlafaxine XR. 225 milliGRAM(s) Oral daily    MEDICATIONS  (PRN):  chlorhexidine 0.12% Liquid 15 milliLiter(s) Swish and Spit two times a day PRN oral hygeine  EPINEPHrine   1 mG/mL (1:1,000) Topical Solution 1 Application(s) Topical every 24 hours PRN for wound bleeding  LORazepam     Tablet 0.25 milliGRAM(s) Oral every 12 hours PRN Anxiety  melatonin 5 milliGRAM(s) Oral at bedtime PRN Insomnia  silver nitrate Applicator 1 Application(s) Topical once PRN wound edge bleeding    ICU Vital Signs Last 24 Hrs  T(C): 36.6 (06 Feb 2024 08:24), Max: 36.9 (05 Feb 2024 13:24)  T(F): 97.8 (06 Feb 2024 08:24), Max: 98.4 (05 Feb 2024 13:24)  HR: 90 (06 Feb 2024 10:00) (90 - 92)  BP: 138/62 (06 Feb 2024 10:00) (108/62 - 171/66)  BP(mean): 89 (06 Feb 2024 10:00) (78 - 103)  ABP: --  ABP(mean): --  RR: 24 (06 Feb 2024 10:00) (19 - 30)  SpO2: 95% (06 Feb 2024 10:00) (92% - 97%)    O2 Parameters below as of 06 Feb 2024 11:00  Patient On (Oxygen Delivery Method): room air          Orthostatic VS    Daily     Daily   I&O's Summary    05 Feb 2024 07:01  -  06 Feb 2024 07:00  --------------------------------------------------------  IN: 2073.6 mL / OUT: 2150 mL / NET: -76.4 mL    06 Feb 2024 07:01  -  06 Feb 2024 11:07  --------------------------------------------------------  IN: 240 mL / OUT: 150 mL / NET: 90 mL        PHYSICAL EXAM:  GENERAL: No acute distress, well-developed  ENT: EOMI, conjunctiva and sclera clear, Neck supple, No JVD, moist mucosa  CHEST/LUNG: Clear to auscultation bilaterally; No wheeze, equal breath sounds bilaterally  HEART: Regular rhythm; No murmurs, rubs, or gallops, radial and DP 2+ b/l, euvolemic  ABDOMEN: Soft, Nontender, Nondistended  EXTREMITIES:  No clubbing, cyanosis, or edema; groin examined with no ecchymosis, induration, significant tenderness, or fluctuance noted  NEUROLOGY: AAOx3, non-focal    LABS:                        8.2    8.90  )-----------( 168      ( 05 Feb 2024 05:30 )             25.3     PT/INR - ( 05 Feb 2024 05:30 )   PT: 14.8 sec;   INR: 1.31          PTT - ( 05 Feb 2024 05:30 )  PTT:35.2 sec  02-05    137  |  103  |  44<H>  ----------------------------<  117<H>  4.3   |  28  |  1.14    Ca    7.8<L>      05 Feb 2024 05:30  Phos  3.7     02-05  Mg     2.1     02-05    TPro  8.3  /  Alb  2.3<L>  /  TBili  4.2<H>  /  DBili  x   /  AST  105<H>  /  ALT  58<H>  /  AlkPhos  122<H>  02-05        Urinalysis Basic - ( 05 Feb 2024 05:30 )    Color: x / Appearance: x / SG: x / pH: x  Gluc: 117 mg/dL / Ketone: x  / Bili: x / Urobili: x   Blood: x / Protein: x / Nitrite: x   Leuk Esterase: x / RBC: x / WBC x   Sq Epi: x / Non Sq Epi: x / Bacteria: x          RADIOLOGY & ADDITIONAL STUDIES:    Telemetry: reviewed; SR with 1st degree AVB    Echo:   < from: TTE Echo Complete w/ Contrast w/ Doppler (01.04.24 @ 10:19) >  CONCLUSIONS:     1. Technically difficult study.   2. Hyperdynamic left ventricular systolic function.   3. Moderate symmetric left ventricular hypertrophy.   4. Grade II left ventriculardiastolic dysfunction.   5. Normal right ventricular size and systolic function.   6. Normal atria.   7. Aortic sclerosis without significant stenosis.   8. Mild-to-moderate aortic regurgitation.   9. Pulmonary artery systolic pressure is 40 mmHg, assuming a right atrial pressure of 8 mmHg.  10. No pericardial effusion.  11. Left pleural effusion.  12. Compared to the previous TTE performed on 11/24/2023, there is mild pulmonary hypertension and TR has increased.    < end of copied text >      Stress Testing: none    Cath: none    CXR: Personally reviewed  < from: Xray Chest 1 View- PORTABLE-Urgent (Xray Chest 1 View- PORTABLE-Urgent .) (01.23.24 @ 21:05) >  IMPRESSION: Pulmonary congestion and pleural effusions, right greater   than left.    --- End of Report ---    < end of copied text >    Other cardiac imaging: none    Other misc imaging: none

## 2024-02-06 NOTE — PROGRESS NOTE ADULT - ASSESSMENT
78M w PMH Crohn's, AFib/Flutter s/p DCCVs on amiodarone, remote ileocectomy and open appendectomy. Admitted (6/23) for SBO vs Crohns flare, s/p NGT decompression and s/p lap converted to open TRE, SBR x 3, left in discontinuity with abthera vac on (6/27), RTOR for ileocolic resection, small bowel anastomosis, and abdominal wall closure on (6/28), c/b fluid collection s/p IR aspiration of perihepatic fluid on (7/3), c/b wound dehiscence s/p RTOR exlap, washout, ileocolic resection with end ileostomy, blow hole colostomy, red rubber from ileostomy to small bowel anastomosis; vicryl bridging mesh on (7/5) transferred to SICU postoperatively for hemodynamic monitoring, with hospital course complicated by periods of severe ELVI and hyponatremia, which resolved but stepped back up for to SICU on (9/10) for acute AMS, intermittent hypoglycemia, AFib with RVR. Percutaneous Cholecystostomy placed on (9/11) for enlarged GB, PCT capped 10/5 and uncapped 10/25 for hyperbilirubinemia, Right brachial DVT, left basillic and cephalic superficial thrombus on duplex 11/2, CT 11/14 with ALDEN ground glass opacity of unclear etiology, completed empiric 7day cefepime course 11/22, and another course of 7day cefepime for PNA  (1/15-23). rising T-bili of unclear etiology, resolved candida glabrata fungemia.    Recs:  - s/p pacemaker with EP - closely monitor vitals  - Recommend epinephrine-soaked gauze for bleeding from friable granulation tissue  - tube study with IR  Rest of care per SICU

## 2024-02-06 NOTE — PROGRESS NOTE ADULT - ASSESSMENT
78M with history of Crohn's disease, known AF/AFL s/p multiple DCCV in the past, previously on amiodarone, with a prolonged hospital stay following extensive abdominal surgery c/b fistula. Experienced a post-micturition pause + syncope on 1/22. Yesterday afternoon, the patient experienced bradycardia and a 20 sec pause while asleep.     -s/p Micra AV implant yesterday   78M with history of Crohn's disease, known AF/AFL s/p multiple DCCV in the past, previously on amiodarone, with a prolonged hospital stay following extensive abdominal surgery c/b fistula. Experienced a post-micturition pause + syncope on 1/22. Yesterday afternoon, the patient experienced bradycardia and a 20 sec pause while asleep.     -s/p Micra AV implant yesterday  -anticoagulation as per primary team    78M with history of Crohn's disease, known AF/AFL s/p multiple DCCV in the past, previously on amiodarone, with a prolonged hospital stay following extensive abdominal surgery c/b fistula. Experienced a post-micturition pause + syncope on 1/22. Yesterday afternoon, the patient experienced bradycardia and a 20 sec pause while asleep.     -s/p Micra AV implant yesterday  -resume anticoagulation fo AF/AFL as per primary team

## 2024-02-07 NOTE — PROCEDURAL SAFETY CHECKLIST WITH OR WITHOUT SEDATION - NSPREPROCDATE6_GEN_ALL_CORE
01-Sep-2023 10:02
07-Feb-2024 13:10
30-Jun-2023 16:33
01-Nov-2023 13:43
27-Jun-2023 10:40
05-Jan-2024 11:07

## 2024-02-07 NOTE — PROCEDURAL SAFETY CHECKLIST WITH OR WITHOUT SEDATION - NSTIMEOUTDATE_GEN_ALL_CORE
30-Jun-2023 16:35
05-Jan-2024 11:14
07-Feb-2024 13:10
01-Sep-2023 10:05
01-Nov-2023 13:40
27-Jun-2023 11:00

## 2024-02-07 NOTE — PROCEDURAL SAFETY CHECKLIST WITH OR WITHOUT SEDATION - NSPROCEDPERFORMDFREE_GEN_ALL_CORE
Left arm PICC line insertion
Chest tube placement
Left PICC
PICC Line Insertion
Double Lumen PICC Insertion
R upper arm single lumen PICC placement

## 2024-02-07 NOTE — PROGRESS NOTE ADULT - SUBJECTIVE AND OBJECTIVE BOX
S: No new issues/events overnight, no new med c/o    O: ICU Vital Signs Last 24 Hrs  T(F): 97.8 (02-07-24 @ 09:00), Max: 97.8 (02-06-24 @ 21:14)  HR: 90 (02-07-24 @ 09:00) (88 - 93)  BP: 118/63 (02-07-24 @ 08:00) (118/63 - 167/75)  BP(mean): 84 (02-07-24 @ 08:00) (84 - 111)  ABP: --  RR: 13 (02-07-24 @ 09:00) (13 - 32)  SpO2: 97% (02-07-24 @ 09:00) (93% - 100%)    PHYSICAL EXAM:   Neurological: AAOx3, CNII-XII intact,  strength 5/5 b/l  ENT: mucus membrane moist  Cardiovascular: RRR  Respiratory: CTA  Gastrointestinal: soft, NT, ND, BS+, no active bleeding from ileostomy, nor fistula. dark redish output in fistula bag, but no active bleeding seen. perc cony capped.   Extremities: warm, dependent edema improved compared with yesterday exam.   Vascular: no cyanosis/erythema  Skin: no rashes  MSK: no joint swelling.     LABS:    02-07    139  |  104  |  41<H>  ----------------------------<  108<H>  4.5   |  30  |  1.17    Ca    8.2<L>      07 Feb 2024 05:30  Phos  4.2     02-07  Mg     2.2     02-07    TPro  8.1  /  Alb  2.2<L>  /  TBili  4.4<H>  /  DBili  2.9<H>  /  AST  108<H>  /  ALT  62<H>  /  AlkPhos  122<H>  02-07  LIVER FUNCTIONS - ( 07 Feb 2024 05:30 )  Alb: 2.2 g/dL / Pro: 8.1 g/dL / ALK PHOS: 122 U/L / ALT: 62 U/L / AST: 108 U/L / GGT: x                               8.0    8.54  )-----------( 159      ( 07 Feb 2024 05:30 )             25.3     Urinalysis Basic - ( 07 Feb 2024 05:30 )    Color: x / Appearance: x / SG: x / pH: x  Gluc: 108 mg/dL / Ketone: x  / Bili: x / Urobili: x   Blood: x / Protein: x / Nitrite: x   Leuk Esterase: x / RBC: x / WBC x   Sq Epi: x / Non Sq Epi: x / Bacteria: x    CAPILLARY BLOOD GLUCOSE        MEDICATIONS  (STANDING):  chlorhexidine 2% Cloths 1 Application(s) Topical <User Schedule>  cholestyramine Powder (Sugar-Free) 4 Gram(s) Oral daily  enoxaparin Injectable 40 milliGRAM(s) SubCutaneous every 24 hours  epoetin matt-epbx (RETACRIT) Injectable 99263 Unit(s) SubCutaneous every 7 days  ergocalciferol 52076 Unit(s) Oral <User Schedule>  gabapentin 100 milliGRAM(s) Oral at bedtime  glucagon  Injectable 1 milliGRAM(s) IntraMuscular once  levothyroxine 37.5 MICROGram(s) Oral daily  lipid, fat emulsion (Fish Oil and Plant Based) 20% Infusion 0.54 Gm/kG/Day (20.83 mL/Hr) IV Continuous <Continuous>  LORazepam     Tablet 1 milliGRAM(s) Oral at bedtime  metoprolol tartrate 50 milliGRAM(s) Oral every 12 hours  Parenteral Nutrition - Adult 1 Each TPN Continuous <Continuous>  Parenteral Nutrition - Adult 1 Each TPN Continuous <Continuous>  teduglutide Injectable 4.7 milliGRAM(s) SubCutaneous every 24 hours  timolol 0.5% Solution 1 Drop(s) Both EYES daily  ursodiol Suspension 300 milliGRAM(s) Oral every 8 hours  venlafaxine XR. 225 milliGRAM(s) Oral daily    MEDICATIONS  (PRN):  chlorhexidine 0.12% Liquid 15 milliLiter(s) Swish and Spit two times a day PRN oral hygeine  EPINEPHrine   1 mG/mL (1:1,000) Topical Solution 1 Application(s) Topical every 24 hours PRN for wound bleeding  LORazepam     Tablet 0.25 milliGRAM(s) Oral every 12 hours PRN Anxiety  melatonin 5 milliGRAM(s) Oral at bedtime PRN Insomnia  silver nitrate Applicator 1 Application(s) Topical once PRN wound edge bleeding      Poole:	  [ ] None	[ ] Daily Poole Order Placed	   Indication:	  [ ] Strict I and O's    [ ] Obstruction     [ ] Incontinence + Stage 3 or 4 Decubitus  Central Line:  [ ] None	   [ ]  Medication / TPN Administration     [ ] No Peripheral IV       o S: No new issues/events overnight, no new med c/o    O: ICU Vital Signs Last 24 Hrs  T(F): 97.8 (02-07-24 @ 09:00), Max: 97.8 (02-06-24 @ 21:14)  HR: 90 (02-07-24 @ 09:00) (88 - 93)  BP: 118/63 (02-07-24 @ 08:00) (118/63 - 167/75)  BP(mean): 84 (02-07-24 @ 08:00) (84 - 111)  ABP: --  RR: 13 (02-07-24 @ 09:00) (13 - 32)  SpO2: 97% (02-07-24 @ 09:00) (93% - 100%)    PHYSICAL EXAM:   Neurological: AAOx3, CNII-XII intact,  strength 5/5 b/l  ENT: mucus membrane moist  Cardiovascular: RRR  Respiratory: CTA  Gastrointestinal: soft, NT, ND, BS+, no active bleeding from ileostomy, nor fistula. dark reddish output in fistula bag, but no active bleeding seen. perc cony capped.   Extremities: warm, dependent edema improved compared with yesterday exam.   Vascular: no cyanosis/erythema  Skin: no rashes  MSK: no joint swelling.     LABS:    02-07    139  |  104  |  41<H>  ----------------------------<  108<H>  4.5   |  30  |  1.17    Ca    8.2<L>      07 Feb 2024 05:30  Phos  4.2     02-07  Mg     2.2     02-07    TPro  8.1  /  Alb  2.2<L>  /  TBili  4.4<H>  /  DBili  2.9<H>  /  AST  108<H>  /  ALT  62<H>  /  AlkPhos  122<H>  02-07  LIVER FUNCTIONS - ( 07 Feb 2024 05:30 )  Alb: 2.2 g/dL / Pro: 8.1 g/dL / ALK PHOS: 122 U/L / ALT: 62 U/L / AST: 108 U/L / GGT: x                               8.0    8.54  )-----------( 159      ( 07 Feb 2024 05:30 )             25.3     Urinalysis Basic - ( 07 Feb 2024 05:30 )    Color: x / Appearance: x / SG: x / pH: x  Gluc: 108 mg/dL / Ketone: x  / Bili: x / Urobili: x   Blood: x / Protein: x / Nitrite: x   Leuk Esterase: x / RBC: x / WBC x   Sq Epi: x / Non Sq Epi: x / Bacteria: x    CAPILLARY BLOOD GLUCOSE        MEDICATIONS  (STANDING):  chlorhexidine 2% Cloths 1 Application(s) Topical <User Schedule>  cholestyramine Powder (Sugar-Free) 4 Gram(s) Oral daily  enoxaparin Injectable 40 milliGRAM(s) SubCutaneous every 24 hours  epoetin matt-epbx (RETACRIT) Injectable 64987 Unit(s) SubCutaneous every 7 days  ergocalciferol 00967 Unit(s) Oral <User Schedule>  gabapentin 100 milliGRAM(s) Oral at bedtime  glucagon  Injectable 1 milliGRAM(s) IntraMuscular once  levothyroxine 37.5 MICROGram(s) Oral daily  lipid, fat emulsion (Fish Oil and Plant Based) 20% Infusion 0.54 Gm/kG/Day (20.83 mL/Hr) IV Continuous <Continuous>  LORazepam     Tablet 1 milliGRAM(s) Oral at bedtime  metoprolol tartrate 50 milliGRAM(s) Oral every 12 hours  Parenteral Nutrition - Adult 1 Each TPN Continuous <Continuous>  Parenteral Nutrition - Adult 1 Each TPN Continuous <Continuous>  teduglutide Injectable 4.7 milliGRAM(s) SubCutaneous every 24 hours  timolol 0.5% Solution 1 Drop(s) Both EYES daily  ursodiol Suspension 300 milliGRAM(s) Oral every 8 hours  venlafaxine XR. 225 milliGRAM(s) Oral daily    MEDICATIONS  (PRN):  chlorhexidine 0.12% Liquid 15 milliLiter(s) Swish and Spit two times a day PRN oral hygeine  EPINEPHrine   1 mG/mL (1:1,000) Topical Solution 1 Application(s) Topical every 24 hours PRN for wound bleeding  LORazepam     Tablet 0.25 milliGRAM(s) Oral every 12 hours PRN Anxiety  melatonin 5 milliGRAM(s) Oral at bedtime PRN Insomnia  silver nitrate Applicator 1 Application(s) Topical once PRN wound edge bleeding      Poole:	  [x ] None	[ ] Daily Poole Order Placed	   Indication:	  [ ] Strict I and O's    [ ] Obstruction     [ ] Incontinence + Stage 3 or 4 Decubitus  Central Line:  [ ] None	   [x ]  Medication / TPN Administration     [ ] No Peripheral IV

## 2024-02-07 NOTE — PROCEDURE NOTE - NSANATOMICLLOCATION_GEN_A_CORE
left/basilic vein
left/upper arm
right/basilic vein
Upper forearm/left/other
left/basilic vein
left/forearm
right/upper arm
right/basilic vein
left/upper arm
left/upper arm
right/basilic vein
HAND/left
left/forearm
left/basilic vein

## 2024-02-07 NOTE — PROGRESS NOTE ADULT - SUBJECTIVE AND OBJECTIVE BOX
Labs stable  Fistula output less than 1 L 24 hours  Vital signs stable  Wound clean  Afebrile    Out of bed  Physical therapy  Discharge planning

## 2024-02-07 NOTE — PROCEDURAL SAFETY CHECKLIST WITH OR WITHOUT SEDATION - NSPREPROCFT_GEN_ALL_CORE
Picc
Left Upper Extremity PICC line insertion
Chest Tube Placement
PICC Line insertion
PICC Line Insertion
R upper arm single lumen PICC placement

## 2024-02-07 NOTE — PROCEDURE NOTE - NSTOLERANCE_GEN_A_CORE
Patient tolerated procedure well.
Sedated on ventilator/Patient tolerated procedure well.
Patient tolerated procedure well.

## 2024-02-07 NOTE — PROCEDURAL SAFETY CHECKLIST WITH OR WITHOUT SEDATION - NSPX2BRECORDED_GEN_ALL_CORE
Left arm single lumen PICC insertion
PICC Line Insertion
Upper Right Arm Double Lumen PICC
Chest tube placement
Left PICC
R upper arm single lumen PICC placement

## 2024-02-07 NOTE — ADVANCED PRACTICE NURSE CONSULT - ASSESSMENT
Called d/t bleeding from denuded area around fistula.     Abdomen: stable fistula with thin brown effluent. Mod amt of bleeding to denuded area at 10 o'clock, surgery in to apply silver nitrate.   Ileostomy pouch CDI without bleeding.    New Dustin's wound/fistula pouch applied over fistula site. Periwound protected with Cavilon barrier wipe then stoma rings applied to the periwound area, stoma powder over denuded area prior to application of Dustin's pouch.

## 2024-02-07 NOTE — PROCEDURE NOTE - NSCOMPLICATION_GEN_A_CORE
no complications

## 2024-02-07 NOTE — PROCEDURE NOTE - NSSECUREBY_VASC_A_CORE
stabilization device
stabilization device/sterile occulsive dressing
stabilization device
stabilization device

## 2024-02-07 NOTE — PROCEDURE NOTE - NSPROCNAME_GEN_A_CORE
Point of Care Ultrasound Lung
Chest Tube
Interventional Radiology
Peripheral Line Insertion
Point of Care Ultrasound Lung
PICC Line Insertion
PICC Line Insertion
Interventional Radiology
PICC Line Insertion
Point of Care Ultrasound Lung
PICC Line Insertion
Peripheral Line Insertion
PICC Line Insertion
Peripheral Line Insertion
PICC Line Insertion

## 2024-02-07 NOTE — PROCEDURAL SAFETY CHECKLIST WITH OR WITHOUT SEDATION - NSPREPROCEDSEDAT_GEN_ALL_CORE
without sedation
Pt sedated and intubated; Lidocaine 1%/with sedation
without sedation

## 2024-02-07 NOTE — PROGRESS NOTE ADULT - ASSESSMENT
78M w PMH Crohn's, AFib/Flutter s/p DCCVs on amiodarone, remote ileocectomy and open appendectomy. Admitted (6/23) for SBO vs Crohns flare, s/p NGT decompression and s/p lap converted to open TRE, SBR x 3, left in discontinuity with abthera vac on (6/27), RTOR for ileocolic resection, small bowel anastomosis, and abdominal wall closure on (6/28), c/b fluid collection s/p IR aspiration of perihepatic fluid on (7/3), c/b wound dehiscence s/p RTOR exlap, washout, ileocolic resection with end ileostomy, blow hole colostomy, red rubber from ileostomy to small bowel anastomosis; vicryl bridging mesh on (7/5) transferred to SICU postoperatively for hemodynamic monitoring, with hospital course complicated by periods of severe ELVI and hyponatremia, which resolved but stepped back up for to SICU on (9/10) for acute AMS, intermittent hypoglycemia, AFib with RVR. Percutaneous Cholecystostomy placed on (9/11) for enlarged GB, PCT capped 10/5 and uncapped 10/25 for hyperbilirubinemia, Right brachial DVT, left basillic and cephalic superficial thrombus on duplex 11/2, CT 11/14 with ALDEN ground glass opacity of unclear etiology, completed empiric 7day cefepime course 11/22, and another course of 7day cefepime for PNA  (1/15-23). rising T-bili of unclear etiology, resolved candida glabrata fungemia.    Recs:  - PICC exchange  - Recommend epinephrine-soaked gauze for bleeding from friable granulation tissue  - tube study with IR  Rest of care per SICU

## 2024-02-07 NOTE — PROCEDURE NOTE - NSPROCDETAILS_GEN_ALL_CORE
location identified, draped/prepped, sterile technique used/blood seen on insertion/dressing applied/flushes easily/secured in place/sterile technique, catheter placed
blood seen on insertion/dressing applied/flushes easily/secured in place/sterile technique, catheter placed
tip visualized at RA-SVC junction/location identified, draped/prepped, sterile technique used/sterile dressing applied/sterile technique, catheter placed/supine position/ultrasound guidance
location identified, draped/prepped, sterile technique used/blood seen on insertion/dressing applied/flushes easily/secured in place
blood seen on insertion/dressing applied/flushes easily/secured in place/sterile technique, catheter placed
blood seen on insertion/dressing applied/flushes easily/secured in place
location identified, draped/prepped, sterile technique used/sterile dressing applied/sterile technique, catheter placed
location identified, draped/prepped, sterile technique used/sterile dressing applied/sterile technique, catheter placed/supine position
Seldinger technique
location identified, draped/prepped, sterile technique used/sterile dressing applied/sterile technique, catheter placed/supine position/ultrasound guidance
tip visualized in RA-SVC junction; 40cm/location identified, draped/prepped, sterile technique used/sterile dressing applied/sterile technique, catheter placed/supine position/ultrasound guidance
location identified, draped/prepped, sterile technique used/blood seen on insertion/dressing applied/flushes easily/secured in place/sterile technique, catheter placed
location identified, draped/prepped, sterile technique used/sterile dressing applied/sterile technique, catheter placed/supine position

## 2024-02-07 NOTE — PROCEDURE NOTE - PICC: IMPLANT LOT NUMBER
[id number]
[facility] lot #: [id number]; 39cm
[facility] Single Lumen Lot#: [id number]  37cm
[employer] [facility] Lot#: [id number]
length- 39cm bard power picc
venkatesh-41cm bard power picc

## 2024-02-07 NOTE — PROCEDURE NOTE - NSINFORMCONSENT_GEN_A_CORE
Benefits, risks, and possible complications of procedure explained to patient/caregiver who verbalized understanding and gave written consent.
Benefits, risks, and possible complications of procedure explained to patient/caregiver who verbalized understanding and gave written consent.
Benefits, risks, and possible complications of procedure explained to patient/caregiver who verbalized understanding and gave verbal consent.
Benefits, risks, and possible complications of procedure explained to patient/caregiver who verbalized understanding and gave verbal consent.
via wife/Benefits, risks, and possible complications of procedure explained to patient/caregiver who verbalized understanding and gave verbal consent.
Benefits, risks, and possible complications of procedure explained to patient/caregiver who verbalized understanding and gave verbal consent.
Benefits, risks, and possible complications of procedure explained to patient/caregiver who verbalized understanding and gave written consent.
Benefits, risks, and possible complications of procedure explained to patient/caregiver who verbalized understanding and gave verbal consent.
Benefits, risks, and possible complications of procedure explained to patient/caregiver who verbalized understanding and gave written consent.
Benefits, risks, and possible complications of procedure explained to patient/caregiver who verbalized understanding and gave verbal consent.
Benefits, risks, and possible complications of procedure explained to patient/caregiver who verbalized understanding and gave written consent.
Benefits, risks, and possible complications of procedure explained to patient/caregiver who verbalized understanding and gave verbal consent.
Benefits, risks, and possible complications of procedure explained to patient/caregiver who verbalized understanding and gave written consent.
Benefits, risks, and possible complications of procedure explained to patient/caregiver who verbalized understanding and gave verbal consent.

## 2024-02-07 NOTE — PROCEDURAL SAFETY CHECKLIST WITH OR WITHOUT SEDATION - NSPRESURGSED_GEN_ALL_CORE
Present, accurate, and signed
n/a
n/a
Present, accurate, and signed

## 2024-02-07 NOTE — PROCEDURE NOTE - NSANESTHESIA_GEN_A_CORE
1% lidocaine
1% lidocaine
no anesthesia administered
1% lidocaine
no anesthesia administered
1% lidocaine
no anesthesia administered
1% lidocaine
no anesthesia administered
no anesthesia administered
1% lidocaine
1% lidocaine
no anesthesia administered
no anesthesia administered

## 2024-02-07 NOTE — PROCEDURE NOTE - NSPOSTPRCRAD_GEN_A_CORE
tip visualized at RA-SVC junction/depth of insertion/fluoroscopy/line adjusted to depth of insertion/line in appropriate postion/postion of catheter/ultrasound
tip visualized in ra- svc junction; 39cm/depth of insertion/line adjusted to depth of insertion/line in appropriate postion/postion of catheter/ultrasound
tip @ ra-svc junction/line in appropriate postion/postion of catheter/ultrasound
tip visualized in RA-SVC junction; 40cm/depth of insertion/line adjusted to depth of insertion/line in appropriate postion/postion of catheter/ultrasound
chest radiograph
tip @ ra-svc junction/line in appropriate postion/postion of catheter/ultrasound

## 2024-02-07 NOTE — CHART NOTE - NSCHARTNOTEFT_GEN_A_CORE
Left PICC line placed at bedside.  Removed right PICC line per primary team. Held pressure and applied pressure dressing.  No bleeding.

## 2024-02-07 NOTE — PROCEDURE NOTE - PROCEDURE DATE TIME, MLM
11-Sep-2023 15:27
15-Sukhdev-2024 09:57
27-Jun-2023 11:31
05-Jan-2024 11:20
24-Aug-2023 21:52
03-Jul-2023 16:11
23-Jul-2023 11:00
25-Aug-2023 01:34
03-Jan-2024 00:14
30-Jun-2023 16:41
26-Jan-2024 08:37
23-Jan-2024 15:26
07-Feb-2024 13:36
01-Sep-2023 11:32
01-Nov-2023 13:57
10-Sukhdev-2024 21:12
04-Dec-2023 15:33

## 2024-02-07 NOTE — PROCEDURE NOTE - NSPOSTCAREGUIDE_GEN_A_CORE
Care for catheter as per unit/ICU protocols
Verbal/written post procedure instructions were given to patient/caregiver/Instructed patient/caregiver regarding signs and symptoms of infection/Keep the cast/splint/dressing clean and dry/Care for catheter as per unit/ICU protocols
Verbal/written post procedure instructions were given to patient/caregiver/Instructed patient/caregiver regarding signs and symptoms of infection/Keep the cast/splint/dressing clean and dry/Care for catheter as per unit/ICU protocols
Verbal/written post procedure instructions were given to patient/caregiver/Care for catheter as per unit/ICU protocols
Verbal/written post procedure instructions were given to patient/caregiver/Instructed patient/caregiver regarding signs and symptoms of infection/Keep the cast/splint/dressing clean and dry/Care for catheter as per unit/ICU protocols
Verbal/written post procedure instructions were given to patient/caregiver/Care for catheter as per unit/ICU protocols
Care for catheter as per unit/ICU protocols
Verbal/written post procedure instructions were given to patient/caregiver/Care for catheter as per unit/ICU protocols

## 2024-02-07 NOTE — PROCEDURE NOTE - PRACTITIONER PERFORMING THE TIME OUT
isidoro RUIZ
kevin hussein, pac
Kerry Solano PA-C
Dannielle Campbell NP
Viry Engle PA-C
Kerry Solano, PAC
isidoro RUIZ
Dannielle Campbell NP
isidoro RUIZ
Viry Engle PA-C

## 2024-02-07 NOTE — PROCEDURAL SAFETY CHECKLIST WITH OR WITHOUT SEDATION - NSPOSTDEBRIEFDT_GEN_ALL_CORE
01-Sep-2023 10:26
27-Jun-2023 11:15
07-Feb-2024 13:25
01-Nov-2023 13:55
30-Jun-2023 16:42
05-Jan-2024 11:27

## 2024-02-07 NOTE — PROCEDURE NOTE - NSPERFORMEDBY_GEN_A_CORE
Myself
Myself
Dr. Rollins/Attending
Myself
Myself
Dr. Andres/Attending
Myself
kevin hussein/PA
Myself
Myself
navin/PA
Myself
Grazyna Washburn PA-C/PA
Myself
Myself

## 2024-02-07 NOTE — PROCEDURE NOTE - NSSITEPREP_SKIN_A_CORE
chlorhexidine
chlorhexidine/Adherence to aseptic technique: hand hygiene prior to donning barriers (gown, gloves), don cap and mask, sterile drape over patient
chlorhexidine/Adherence to aseptic technique: hand hygiene prior to donning barriers (gown, gloves), don cap and mask, sterile drape over patient
chlorhexidine
chlorhexidine
chlorhexidine/Adherence to aseptic technique: hand hygiene prior to donning barriers (gown, gloves), don cap and mask, sterile drape over patient
chlorhexidine
chlorhexidine

## 2024-02-07 NOTE — PROGRESS NOTE ADULT - SUBJECTIVE AND OBJECTIVE BOX
SUBJECTIVE: Patient seen and examined at bedside. Patient tolerating his diet without any acute complaints this morning.      enoxaparin Injectable 40 milliGRAM(s) SubCutaneous every 24 hours  metoprolol tartrate 50 milliGRAM(s) Oral every 12 hours      Vital Signs Last 24 Hrs  T(C): 36.3 (07 Feb 2024 12:00), Max: 36.6 (06 Feb 2024 21:14)  T(F): 97.3 (07 Feb 2024 12:00), Max: 97.8 (06 Feb 2024 21:14)  HR: 91 (07 Feb 2024 14:27) (88 - 93)  BP: 109/58 (07 Feb 2024 14:27) (109/58 - 167/75)  BP(mean): 78 (07 Feb 2024 14:27) (78 - 106)  RR: 17 (07 Feb 2024 14:27) (13 - 32)  SpO2: 96% (07 Feb 2024 14:27) (96% - 99%)    Parameters below as of 07 Feb 2024 14:27  Patient On (Oxygen Delivery Method): room air      I&O's Detail    06 Feb 2024 07:01  -  07 Feb 2024 07:00  --------------------------------------------------------  IN:    Fat Emulsion (Fish Oil &amp; Plant Based) 20% Infusion: 187.2 mL    Oral Fluid: 840 mL    TPN (Total Parenteral Nutrition): 860 mL  Total IN: 1887.2 mL    OUT:    Drain (mL): 900 mL    Voided (mL): 1475 mL  Total OUT: 2375 mL    Total NET: -487.8 mL      07 Feb 2024 07:01  -  07 Feb 2024 15:49  --------------------------------------------------------  IN:    Oral Fluid: 480 mL  Total IN: 480 mL    OUT:    Drain (mL): 625 mL    Voided (mL): 500 mL  Total OUT: 1125 mL    Total NET: -645 mL          General: NAD, resting comfortably in bed  C/V: NSR  Pulm: Nonlabored breathing, no respiratory distress  Abd: soft, NT/ND, EAF with wound manager in place, ileostomy with no signs of bleeding  Extrem: WWP, no edema, SCDs in place        LABS:                        8.0    8.54  )-----------( 159      ( 07 Feb 2024 05:30 )             25.3     02-07    139  |  104  |  41<H>  ----------------------------<  108<H>  4.5   |  30  |  1.17    Ca    8.2<L>      07 Feb 2024 05:30  Phos  4.2     02-07  Mg     2.2     02-07    TPro  8.1  /  Alb  2.2<L>  /  TBili  4.4<H>  /  DBili  2.9<H>  /  AST  108<H>  /  ALT  62<H>  /  AlkPhos  122<H>  02-07      Urinalysis Basic - ( 07 Feb 2024 05:30 )    Color: x / Appearance: x / SG: x / pH: x  Gluc: 108 mg/dL / Ketone: x  / Bili: x / Urobili: x   Blood: x / Protein: x / Nitrite: x   Leuk Esterase: x / RBC: x / WBC x   Sq Epi: x / Non Sq Epi: x / Bacteria: x        RADIOLOGY & ADDITIONAL STUDIES:

## 2024-02-07 NOTE — PROCEDURE NOTE - NSINDICATIONS_GEN_A_CORE
emergency venous access
fluid administration
pneumothorax
Total Parenteral Nutrition/TPN/venous access
antibiotic therapy
Total Parenteral Nutrition/TPN
fluid administration
antibiotic therapy
fluid administration
emergency venous access/fluid administration
Total Parenteral Nutrition/TPN

## 2024-02-07 NOTE — PROGRESS NOTE ADULT - ASSESSMENT
78M w PMH Crohn's, AFib/Flutter s/p DCCVs on amiodarone, remote ileocectomy and open appendectomy. Admitted (6/23) for SBO vs Crohns flare, s/p NGT decompression and s/p lap converted to open TRE, SBR x 3, left in discontinuity with abthera vac on (6/27), RTOR for ileocolic resection, small bowel anastomosis, and abdominal wall closure on (6/28), c/b fluid collection s/p IR aspiration of perihepatic fluid on (7/3), c/b wound dehiscence s/p RTOR exlap, washout, ileocolic resection with end ileostomy, blow hole colostomy, red rubber from ileostomy to small bowel anastomosis; vicryl bridging mesh on (7/5) transferred to SICU postoperatively for hemodynamic monitoring, with hospital course complicated by periods of severe ELVI and hyponatremia, which resolved but stepped back up for to SICU on (9/10) for acute AMS, intermittent hypoglycemia, AFib with RVR. Percutaneous Cholecystostomy placed on (9/11) for enlarged GB, PCT capped 10/5 and uncapped 10/25 for hyperbilirubinemia, Right brachial DVT, left basillic and cephalic superficial thrombus on duplex 11/2, CT 11/14 with ALDEN ground glass opacity of unclear etiology, completed empiric 7day cefepime course 11/22, and another course of 7day cefepime for PNA  (1/15-23), hyperbilirubinemia of unclear etiology, resolved candida glabrata fungemia, ELVI resolved, second episode of sinus pause--pacemaker placed (2/5).    NEURO: Hx of depression: cont Effexor 150 QD.  Anxiety: Lorazepam PRN. Holistic consult for anxiety and insomnia. Gabapentin for restless leg syndrome.   HEENT: Timolol eye gtt added on 1/10 for additional systemic BB support in setting of malabsorption  CV:Fluid overload resolved.  Hx of Afib and recent Aflutter alternating with sinus tachycardia (HR 100s): Changed to po Lopressor 50 q12. Cont timolol eye ggt for attempted systemic affect. Cont Lovenox. S/p previous DCCV, off Amiodarone and Lipitor due to persistent transaminitis. BLE duplex (1/5) no DVT, TTE  (7/18) - PASP 64mmHg, EF 65-70%. Holding Losartan & norvasc, TTE (1/4) LVEF 75%, mild/mod AR, PASP 40, RVEF wnl. S/p PPM 2/5.   PULM: Atelectasis/dyspnea improved clinically: cont room air, will dc Bipap. PRN duonebs for wheezing. Encourage OOB and IS.   GI/FEN: Low res, low fat, high protein diet, Ensure Max. 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 Continue TPN/lipids (now running over 12 hours), Cont Octreotide & Cholestyramine 10/18. Transaminitis slowly improved, elevated T bili, s/p Perc Deb on 9/11, uncapped on 1/15, recap on (1/17). Seen by Hepatology - MRCP 10/30 no obstruction, cont ursodiol, RUQ US 11/25 CBD 5mm, hepatic vessels patent. Cont Vit D weekly.   : Voids. ELVI improved: stopped famotidine given renal dysfunction. MARLO Duplex and RP US unremarkable, CK wnl.    ENDO: Hx hypothyroid: Cont Synthroid,  Repeat thyroid studies WNL 1/3/24.   ID: currently not on any abx. Completed cefepime for PNA (1/15-23) BCx 11/22 grew candida glabrata, likely CLABSI. s/p Caspo & completed Fluconazole course (12/1-12/9). Ophthalmology evaluated - normal ocular exam. Echo no vegetation. PICC replaced on 12/4. Cont Nystatin powder. // DC: caspofungin (11/24-12/1). empiric 7days cefepime (11/15-11/22), C. tertium, Lactobacillus from IR cx 7/3, and candida albicans, lactobacillus, vanc sensitive E faecium, vanc resistant E gallinarum, vanc resistant E casseliflavus, lactobacillus from OR cx 7/5. Completed course of abx with Imipenem (9/10-9/15). Imipenem (8/26--) imipenem (6/30-7/12, 7/23-7/30), Dapto (6/30-7/5 and 7/23-7/24). Empiric Dapto (8/23-24) and Cefepime (8/23-24).   PPX: SCDs, currently on PPX lovenox given hx of thrombosis.   HEME: Anemia - continue Epogen weekly. Previously treated with Iron and intermittent transfusions.   LINES: PIVs, RUE PICC placed (1/5--) will plan to switch PICC once a month in setting of fungemia history, will switch picc today or tomorrow.  // DC: LUE PICC (9/1-11/1 ), RUE PICC: (11/1-11/24), LUE PICC (12/4-1/5)  WOUNDS/DRAINS: Abdominal wound and fistula with wound manager in place dressing change daily. Had recurrent punctate bleeding at wound bed, s/p placed surgicel, attempt to stitch, electrocautery and epi soaked gauze. Cont epinephrine soaked gauze at bedside as needed for wound bleeding. RLQ Ostomy. IR perc deb tube recapped 1/17, IR to do tube study tomorrow eval if cystic duct open to decide on removal vs exchange.  // DC: L Clay drain.  PT: Ambulate as tolerated OOB to chair daily. Repeat PT consult orderd on 2/2.   DISPO: SICU due to complicated hospital course. Cont Labs every other day. Patient will need a Semi-electric hospital bed and gel overlay and side rails for home use. Patients head needs to be elevated 30 degrees to prevent aspirations and wedges and pillows have been tried and failed. Patient is unable to make frequent changes to body position on their own. The member can independently affect the adjustment by operating the controls. Length of need is 36 months. will need rolling walker.

## 2024-02-08 NOTE — PROGRESS NOTE ADULT - ASSESSMENT
78M w PMH Crohn's, AFib/Flutter s/p DCCVs on amiodarone, remote ileocectomy and open appendectomy. Admitted (6/23) for SBO vs Crohns flare, s/p NGT decompression and s/p lap converted to open TRE, SBR x 3, left in discontinuity with abthera vac on (6/27), RTOR for ileocolic resection, small bowel anastomosis, and abdominal wall closure on (6/28), c/b fluid collection s/p IR aspiration of perihepatic fluid on (7/3), c/b wound dehiscence s/p RTOR exlap, washout, ileocolic resection with end ileostomy, blow hole colostomy, red rubber from ileostomy to small bowel anastomosis; vicryl bridging mesh on (7/5) transferred to SICU postoperatively for hemodynamic monitoring, with hospital course complicated by periods of severe ELVI and hyponatremia, which resolved but stepped back up for to SICU on (9/10) for acute AMS, intermittent hypoglycemia, AFib with RVR. Percutaneous Cholecystostomy placed on (9/11) for enlarged GB, PCT capped 10/5 and uncapped 10/25 for hyperbilirubinemia, Right brachial DVT, left basillic and cephalic superficial thrombus on duplex 11/2, CT 11/14 with ALDEN ground glass opacity of unclear etiology, completed empiric 7day cefepime course 11/22, and another course of 7day cefepime for PNA  (1/15-23), hyperbilirubinemia of unclear etiology, resolved candida glabrata fungemia, ELVI resolved, second episode of sinus pause--pacemaker placed (2/5). cystic duct open, Perc deb removed 2/8    NEURO: Hx of depression: cont Effexor 150 QD.  Anxiety: Lorazepam PRN. Holistic consult for anxiety and insomnia. Gabapentin for restless leg syndrome.   HEENT: Timolol eye gtt added on 1/10 for additional systemic BB support in setting of malabsorption  CV: Fluid overload resolved.  Hx of Afib and recent Aflutter alternating with sinus tachycardia (HR 100s): Changed to po Lopressor 50 q12. Cont timolol eye gtt for attempted systemic affect. Cont Lovenox. S/p previous DCCV, off Amiodarone and Lipitor due to persistent transaminitis. BLE duplex (1/5) no DVT, TTE  (7/18) - PASP 64mmHg, EF 65-70%. Holding Losartan & norvasc, TTE (1/4) LVEF 75%, mild/mod AR, PASP 40, RVEF wnl. S/p PPM 2/5.   PULM: Atelectasis/dyspnea improved clinically: cont room air, will dc Bipap. PRN duonebs for wheezing. Encourage OOB and IS.   GI/FEN: Low res, low fat, high protein diet, Ensure Max. 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 Continue TPN/lipids (now running over 12 hours), Cont Octreotide & Cholestyramine 10/18. Transaminitis slowly improved, elevated T bili, s/p Perc Deb on 9/11, uncapped on 1/15, recap on (1/17), removed on 2/8, Seen by Hepatology - MRCP 10/30 no obstruction, cont ursodiol, RUQ US 11/25 CBD 5mm, hepatic vessels patent. Cont Vit D weekly.   : Voids. ELVI improved: stopped famotidine given renal dysfunction. MARLO Duplex and RP US unremarkable, CK wnl.    ENDO: Hx hypothyroid: Cont Synthroid,  Repeat thyroid studies WNL 1/3/24.   ID: currently not on any abx. Completed cefepime for PNA (1/15-23) BCx 11/22 grew candida glabrata, likely CLABSI. s/p Caspo & completed Fluconazole course (12/1-12/9). Ophthalmology evaluated - normal ocular exam. Echo no vegetation. PICC replaced on 12/4. Cont Nystatin powder. // DC: caspofungin (11/24-12/1). empiric 7days cefepime (11/15-11/22), C. tertium, Lactobacillus from IR cx 7/3, and candida albicans, lactobacillus, vanc sensitive E faecium, vanc resistant E gallinarum, vanc resistant E casseliflavus, lactobacillus from OR cx 7/5. Completed course of abx with Imipenem (9/10-9/15). Imipenem (8/26--) imipenem (6/30-7/12, 7/23-7/30), Dapto (6/30-7/5 and 7/23-7/24). Empiric Dapto (8/23-24) and Cefepime (8/23-24).   PPX: SCDs, currently on PPX lovenox given hx of thrombosis.   HEME: Anemia - continue Epogen weekly. Previously treated with Iron and intermittent transfusions. hgb 7.5 - transfuse 1U PRBC  LINES: PIVs,  new LUE placed yesterday (2/7--) will plan to switch PICC once a month in setting of fungemia history,  // DC: LUE PICC (9/1-11/1 ), RUE PICC: (11/1-11/24), LUE PICC (12/4-1/5) RUE PICC placed (1/5-2/7)   WOUNDS/DRAINS: Abdominal wound and fistula with wound manager in place dressing change daily. Had recurrent punctate bleeding at wound bed, s/p placed surgicel, attempt to stitch, electrocautery and epi soaked gauze. Cont epinephrine soaked gauze at bedside as needed for wound bleeding. RLQ Ostomy. IR perc deb tube recapped 1/17, IR tube study yesterday patent cystic duct, perc deb removed yesterday (2/7)  // DC: L Clay drain.  PT: Ambulate as tolerated OOB to chair daily. Repeat PT consult orderd on 2/2.   DISPO: SICU due to complicated hospital course. Cont Labs every other day. Patient will need a Semi-electric hospital bed and gel overlay and side rails for home use. Patients head needs to be elevated 30 degrees to prevent aspirations and wedges and pillows have been tried and failed. Patient is unable to make frequent changes to body position on their own. The member can independently affect the adjustment by operating the controls. Length of need is 36 months. will need rolling walker.   negative...

## 2024-02-08 NOTE — PROGRESS NOTE ADULT - SUBJECTIVE AND OBJECTIVE BOX
AVSS  Urine output excellent  Labs stable  Output fistula with some form  Transfusion one unit of packed red blood cells  Discharge planning

## 2024-02-08 NOTE — PROGRESS NOTE ADULT - ASSESSMENT
78M w PMH Crohn's, AFib/Flutter s/p DCCVs on amiodarone, remote ileocectomy and open appendectomy. Admitted (6/23) for SBO vs Crohns flare, s/p NGT decompression and s/p lap converted to open TRE, SBR x 3, left in discontinuity with abthera vac on (6/27), RTOR for ileocolic resection, small bowel anastomosis, and abdominal wall closure on (6/28), c/b fluid collection s/p IR aspiration of perihepatic fluid on (7/3), c/b wound dehiscence s/p RTOR exlap, washout, ileocolic resection with end ileostomy, blow hole colostomy, red rubber from ileostomy to small bowel anastomosis; vicryl bridging mesh on (7/5) transferred to SICU postoperatively for hemodynamic monitoring, with hospital course complicated by periods of severe ELVI and hyponatremia, which resolved but stepped back up for to SICU on (9/10) for acute AMS, intermittent hypoglycemia, AFib with RVR. Percutaneous Cholecystostomy placed on (9/11) for enlarged GB, PCT capped 10/5 and uncapped 10/25 for hyperbilirubinemia, Right brachial DVT, left basillic and cephalic superficial thrombus on duplex 11/2, CT 11/14 with ALDEN ground glass opacity of unclear etiology, completed empiric 7day cefepime course 11/22, and another course of 7day cefepime for PNA  (1/15-23). rising T-bili of unclear etiology, resolved candida glabrata fungemia.    Transfuse 1U PRBC  Dispo planning  Appreciate excellent SICU care

## 2024-02-08 NOTE — PROGRESS NOTE ADULT - SUBJECTIVE AND OBJECTIVE BOX
SUBJECTIVE: Patient seen and examined at bedside. Patient without any acute complaints this morning, he is tolerating his diet without nausea or vomiting.      enoxaparin Injectable 40 milliGRAM(s) SubCutaneous every 24 hours  metoprolol tartrate 50 milliGRAM(s) Oral every 12 hours      Vital Signs Last 24 Hrs  T(C): 36.8 (07 Feb 2024 21:50), Max: 36.8 (07 Feb 2024 21:50)  T(F): 98.2 (07 Feb 2024 21:50), Max: 98.2 (07 Feb 2024 21:50)  HR: 92 (08 Feb 2024 02:00) (89 - 93)  BP: 126/64 (08 Feb 2024 02:00) (109/58 - 134/63)  BP(mean): 89 (08 Feb 2024 02:00) (78 - 89)  RR: 25 (08 Feb 2024 02:00) (13 - 29)  SpO2: 99% (08 Feb 2024 02:00) (96% - 99%)    Parameters below as of 08 Feb 2024 02:00  Patient On (Oxygen Delivery Method): nasal cannula  O2 Flow (L/min): 2    I&O's Detail    07 Feb 2024 07:01  -  08 Feb 2024 07:00  --------------------------------------------------------  IN:    Fat Emulsion (Fish Oil &amp; Plant Based) 20% Infusion: 395.2 mL    Oral Fluid: 720 mL    TPN (Total Parenteral Nutrition): 1376 mL  Total IN: 2491.2 mL    OUT:    Drain (mL): 1125 mL    Voided (mL): 1080 mL  Total OUT: 2205 mL    Total NET: 286.2 mL          General: NAD, resting comfortably in bed  C/V: NSR  Pulm: Nonlabored breathing, no respiratory distress  Abd: soft, NT/ND, EAF with wound manager in place, ileostomy with no signs of bleeding  Extrem: WWP, no edema, SCDs in place      LABS:                        7.5    7.59  )-----------( 152      ( 08 Feb 2024 05:30 )             24.2     02-07    139  |  104  |  41<H>  ----------------------------<  108<H>  4.5   |  30  |  1.17    Ca    8.2<L>      07 Feb 2024 05:30  Phos  4.2     02-07  Mg     2.2     02-07    TPro  8.1  /  Alb  2.2<L>  /  TBili  4.4<H>  /  DBili  2.9<H>  /  AST  108<H>  /  ALT  62<H>  /  AlkPhos  122<H>  02-07      Urinalysis Basic - ( 07 Feb 2024 05:30 )    Color: x / Appearance: x / SG: x / pH: x  Gluc: 108 mg/dL / Ketone: x  / Bili: x / Urobili: x   Blood: x / Protein: x / Nitrite: x   Leuk Esterase: x / RBC: x / WBC x   Sq Epi: x / Non Sq Epi: x / Bacteria: x        RADIOLOGY & ADDITIONAL STUDIES:

## 2024-02-08 NOTE — PROGRESS NOTE ADULT - ASSESSMENT
78M w PMH Crohn's, AFib/Flutter s/p DCCVs on amiodarone, remote ileocectomy and open appendectomy. Admitted (6/23) for SBO vs Crohns flare, s/p NGT decompression and s/p lap converted to open TRE, SBR x 3, left in discontinuity with abthera vac on (6/27), RTOR for ileocolic resection, small bowel anastomosis, and abdominal wall closure on (6/28), c/b fluid collection s/p IR aspiration of perihepatic fluid on (7/3), c/b wound dehiscence s/p RTOR exlap, washout, ileocolic resection with end ileostomy, blow hole colostomy, red rubber from ileostomy to small bowel anastomosis; vicryl bridging mesh on (7/5) transferred to SICU postoperatively for hemodynamic monitoring, with hospital course complicated by periods of severe ELVI and hyponatremia, which resolved but stepped back up for to SICU on (9/10) for acute AMS, intermittent hypoglycemia, AFib with RVR. Percutaneous Cholecystostomy placed on (9/11) for enlarged GB, PCT capped 10/5 and uncapped 10/25 for hyperbilirubinemia, Right brachial DVT, left basillic and cephalic superficial thrombus on duplex 11/2, CT 11/14 with ALDEN ground glass opacity of unclear etiology, completed empiric 7day cefepime course 11/22, and another course of 7day cefepime for PNA  (1/15-23). rising T-bili of unclear etiology, resolved candida glabrata fungemia.    Recs:  - s/p PICC exchange 2/7  - Recommend epinephrine-soaked gauze for bleeding from friable granulation tissue  Rest of care per SICU 78M w PMH Crohn's, AFib/Flutter s/p DCCVs on amiodarone, remote ileocectomy and open appendectomy. Admitted (6/23) for SBO vs Crohns flare, s/p NGT decompression and s/p lap converted to open TRE, SBR x 3, left in discontinuity with abthera vac on (6/27), RTOR for ileocolic resection, small bowel anastomosis, and abdominal wall closure on (6/28), c/b fluid collection s/p IR aspiration of perihepatic fluid on (7/3), c/b wound dehiscence s/p RTOR exlap, washout, ileocolic resection with end ileostomy, blow hole colostomy, red rubber from ileostomy to small bowel anastomosis; vicryl bridging mesh on (7/5) transferred to SICU postoperatively for hemodynamic monitoring, with hospital course complicated by periods of severe ELVI and hyponatremia, which resolved but stepped back up for to SICU on (9/10) for acute AMS, intermittent hypoglycemia, AFib with RVR. Percutaneous Cholecystostomy placed on (9/11) for enlarged GB, PCT capped 10/5 and uncapped 10/25 for hyperbilirubinemia, Right brachial DVT, left basillic and cephalic superficial thrombus on duplex 11/2, CT 11/14 with ALDEN ground glass opacity of unclear etiology, completed empiric 7day cefepime course 11/22, and another course of 7day cefepime for PNA  (1/15-23). rising T-bili of unclear etiology, resolved candida glabrata fungemia.    Recs:  - s/p PICC exchange 2/7  - s/p tube study with patent cystic duct; perc cony dc'd 2/7   - Recommend epinephrine-soaked gauze for bleeding from friable granulation tissue  Rest of care per SICU

## 2024-02-08 NOTE — PROGRESS NOTE ADULT - SUBJECTIVE AND OBJECTIVE BOX
S: No new issues/events overnight, no new med c/o    O: ICU Vital Signs Last 24 Hrs  T(F): 97.6 (02-08-24 @ 09:00), Max: 98.2 (02-07-24 @ 21:50)  HR: 90 (02-08-24 @ 12:00) (89 - 93)  BP: 143/82 (02-08-24 @ 07:00) (109/58 - 143/82)  BP(mean): 104 (02-08-24 @ 07:00) (78 - 104)  ABP: --  RR: 21 (02-08-24 @ 12:00) (17 - 32)  SpO2: 95% (02-08-24 @ 12:00) (95% - 100%)    PHYSICAL EXAM:   Neurological: AAOx3, CNII-XII intact,  strength 5/5 b/l  ENT: mucus membrane moist  Cardiovascular: RRR  Respiratory: CTA  Gastrointestinal: soft, NT, ND, BS+, no active bleeding from ileostomy, nor fistula. dark output in fistula bag, but no active bleeding seen. perc cony capped.   Extremities: warm, dependent edema improved compared with yesterday exam.   Vascular: no cyanosis/erythema  Skin: no rashes  MSK: no joint swelling.         LABS:    02-07    139  |  104  |  41<H>  ----------------------------<  108<H>  4.5   |  30  |  1.17    Ca    8.2<L>      07 Feb 2024 05:30  Phos  4.2     02-07  Mg     2.2     02-07    TPro  8.1  /  Alb  2.2<L>  /  TBili  4.4<H>  /  DBili  2.9<H>  /  AST  108<H>  /  ALT  62<H>  /  AlkPhos  122<H>  02-07  LIVER FUNCTIONS - ( 07 Feb 2024 05:30 )  Alb: 2.2 g/dL / Pro: 8.1 g/dL / ALK PHOS: 122 U/L / ALT: 62 U/L / AST: 108 U/L / GGT: x                               7.5    7.59  )-----------( 152      ( 08 Feb 2024 05:30 )             24.2     Urinalysis Basic - ( 07 Feb 2024 05:30 )    Color: x / Appearance: x / SG: x / pH: x  Gluc: 108 mg/dL / Ketone: x  / Bili: x / Urobili: x   Blood: x / Protein: x / Nitrite: x   Leuk Esterase: x / RBC: x / WBC x   Sq Epi: x / Non Sq Epi: x / Bacteria: x    CAPILLARY BLOOD GLUCOSE        MEDICATIONS  (STANDING):  chlorhexidine 2% Cloths 1 Application(s) Topical <User Schedule>  chlorhexidine 2% Cloths 1 Application(s) Topical <User Schedule>  cholestyramine Powder (Sugar-Free) 4 Gram(s) Oral daily  enoxaparin Injectable 40 milliGRAM(s) SubCutaneous every 24 hours  epoetin matt-epbx (RETACRIT) Injectable 90306 Unit(s) SubCutaneous every 7 days  ergocalciferol 79592 Unit(s) Oral <User Schedule>  gabapentin 100 milliGRAM(s) Oral at bedtime  glucagon  Injectable 1 milliGRAM(s) IntraMuscular once  levothyroxine 37.5 MICROGram(s) Oral daily  lipid, fat emulsion (Fish Oil and Plant Based) 20% Infusion 0.54 Gm/kG/Day (20.83 mL/Hr) IV Continuous <Continuous>  LORazepam     Tablet 0.5 milliGRAM(s) Oral at bedtime  metoprolol tartrate 50 milliGRAM(s) Oral every 12 hours  Parenteral Nutrition - Adult 1 Each TPN Continuous <Continuous>  Parenteral Nutrition - Adult 1 Each TPN Continuous <Continuous>  teduglutide Injectable 4.7 milliGRAM(s) SubCutaneous every 24 hours  timolol 0.5% Solution 1 Drop(s) Both EYES daily  ursodiol Suspension 300 milliGRAM(s) Oral every 8 hours  venlafaxine XR. 225 milliGRAM(s) Oral daily    MEDICATIONS  (PRN):  chlorhexidine 0.12% Liquid 15 milliLiter(s) Swish and Spit two times a day PRN oral hygeine  EPINEPHrine   1 mG/mL (1:1,000) Topical Solution 1 Application(s) Topical every 24 hours PRN for wound bleeding  LORazepam     Tablet 0.25 milliGRAM(s) Oral every 12 hours PRN Anxiety  melatonin 5 milliGRAM(s) Oral at bedtime PRN Insomnia  silver nitrate Applicator 1 Application(s) Topical once PRN wound edge bleeding  sodium chloride 0.9% lock flush 10 milliLiter(s) IV Push every 1 hour PRN Pre/post blood products, medications, blood draw, and to maintain line patency  sodium chloride 0.9% lock flush 10 milliLiter(s) IV Push every 1 hour PRN Pre/post blood products, medications, blood draw, and to maintain line patency      Poole:	  [x ] None	[ ] Daily Poole Order Placed	   Indication:	  [ ] Strict I and O's    [ ] Obstruction     [ ] Incontinence + Stage 3 or 4 Decubitus  Central Line:  [ ] None	   [x ]  Medication / TPN Administration     [ ] No Peripheral IV        S: No new issues/events overnight, no new med c/o    O: ICU Vital Signs Last 24 Hrs  T(F): 97.6 (02-08-24 @ 09:00), Max: 98.2 (02-07-24 @ 21:50)  HR: 90 (02-08-24 @ 12:00) (89 - 93)  BP: 143/82 (02-08-24 @ 07:00) (109/58 - 143/82)  BP(mean): 104 (02-08-24 @ 07:00) (78 - 104)  ABP: --  RR: 21 (02-08-24 @ 12:00) (17 - 32)  SpO2: 95% (02-08-24 @ 12:00) (95% - 100%)    PHYSICAL EXAM:   Neurological: AAOx3, CNII-XII intact,  strength 5/5 b/l  ENT: mucus membrane moist  Cardiovascular: RRR  Respiratory: CTA  Gastrointestinal: soft, NT, ND, BS+, no active bleeding from ileostomy, nor fistula. dark output in fistula bag, but no active bleeding seen.   Extremities: warm, dependent edema improved compared with yesterday exam.   Vascular: no cyanosis/erythema  Skin: no rashes  MSK: no joint swelling.         LABS:    02-07    139  |  104  |  41<H>  ----------------------------<  108<H>  4.5   |  30  |  1.17    Ca    8.2<L>      07 Feb 2024 05:30  Phos  4.2     02-07  Mg     2.2     02-07    TPro  8.1  /  Alb  2.2<L>  /  TBili  4.4<H>  /  DBili  2.9<H>  /  AST  108<H>  /  ALT  62<H>  /  AlkPhos  122<H>  02-07  LIVER FUNCTIONS - ( 07 Feb 2024 05:30 )  Alb: 2.2 g/dL / Pro: 8.1 g/dL / ALK PHOS: 122 U/L / ALT: 62 U/L / AST: 108 U/L / GGT: x                               7.5    7.59  )-----------( 152      ( 08 Feb 2024 05:30 )             24.2     Urinalysis Basic - ( 07 Feb 2024 05:30 )    Color: x / Appearance: x / SG: x / pH: x  Gluc: 108 mg/dL / Ketone: x  / Bili: x / Urobili: x   Blood: x / Protein: x / Nitrite: x   Leuk Esterase: x / RBC: x / WBC x   Sq Epi: x / Non Sq Epi: x / Bacteria: x    CAPILLARY BLOOD GLUCOSE        MEDICATIONS  (STANDING):  chlorhexidine 2% Cloths 1 Application(s) Topical <User Schedule>  chlorhexidine 2% Cloths 1 Application(s) Topical <User Schedule>  cholestyramine Powder (Sugar-Free) 4 Gram(s) Oral daily  enoxaparin Injectable 40 milliGRAM(s) SubCutaneous every 24 hours  epoetin matt-epbx (RETACRIT) Injectable 98381 Unit(s) SubCutaneous every 7 days  ergocalciferol 41216 Unit(s) Oral <User Schedule>  gabapentin 100 milliGRAM(s) Oral at bedtime  glucagon  Injectable 1 milliGRAM(s) IntraMuscular once  levothyroxine 37.5 MICROGram(s) Oral daily  lipid, fat emulsion (Fish Oil and Plant Based) 20% Infusion 0.54 Gm/kG/Day (20.83 mL/Hr) IV Continuous <Continuous>  LORazepam     Tablet 0.5 milliGRAM(s) Oral at bedtime  metoprolol tartrate 50 milliGRAM(s) Oral every 12 hours  Parenteral Nutrition - Adult 1 Each TPN Continuous <Continuous>  Parenteral Nutrition - Adult 1 Each TPN Continuous <Continuous>  teduglutide Injectable 4.7 milliGRAM(s) SubCutaneous every 24 hours  timolol 0.5% Solution 1 Drop(s) Both EYES daily  ursodiol Suspension 300 milliGRAM(s) Oral every 8 hours  venlafaxine XR. 225 milliGRAM(s) Oral daily    MEDICATIONS  (PRN):  chlorhexidine 0.12% Liquid 15 milliLiter(s) Swish and Spit two times a day PRN oral hygeine  EPINEPHrine   1 mG/mL (1:1,000) Topical Solution 1 Application(s) Topical every 24 hours PRN for wound bleeding  LORazepam     Tablet 0.25 milliGRAM(s) Oral every 12 hours PRN Anxiety  melatonin 5 milliGRAM(s) Oral at bedtime PRN Insomnia  silver nitrate Applicator 1 Application(s) Topical once PRN wound edge bleeding  sodium chloride 0.9% lock flush 10 milliLiter(s) IV Push every 1 hour PRN Pre/post blood products, medications, blood draw, and to maintain line patency  sodium chloride 0.9% lock flush 10 milliLiter(s) IV Push every 1 hour PRN Pre/post blood products, medications, blood draw, and to maintain line patency      Poole:	  [x ] None	[ ] Daily Poole Order Placed	   Indication:	  [ ] Strict I and O's    [ ] Obstruction     [ ] Incontinence + Stage 3 or 4 Decubitus  Central Line:  [ ] None	   [x ]  Medication / TPN Administration     [ ] No Peripheral IV

## 2024-02-08 NOTE — PROGRESS NOTE ADULT - SUBJECTIVE AND OBJECTIVE BOX
SUBJECTIVE: Patient seen and examined at bedside. In good spirits, denies pain, able to rhoda PO without n/v. Denies cp, sob.    enoxaparin Injectable 40 milliGRAM(s) SubCutaneous every 24 hours  metoprolol tartrate 50 milliGRAM(s) Oral every 12 hours      Vital Signs Last 24 Hrs  T(C): 36.4 (08 Feb 2024 16:00), Max: 36.8 (07 Feb 2024 21:50)  T(F): 97.5 (08 Feb 2024 16:00), Max: 98.2 (07 Feb 2024 21:50)  HR: 90 (08 Feb 2024 16:00) (89 - 93)  BP: 131/63 (08 Feb 2024 16:00) (114/55 - 143/82)  BP(mean): 90 (08 Feb 2024 16:00) (78 - 104)  RR: 20 (08 Feb 2024 16:00) (19 - 32)  SpO2: 100% (08 Feb 2024 16:00) (95% - 100%)    Parameters below as of 08 Feb 2024 16:00  Patient On (Oxygen Delivery Method): room air      I&O's Detail    07 Feb 2024 07:01  -  08 Feb 2024 07:00  --------------------------------------------------------  IN:    Fat Emulsion (Fish Oil &amp; Plant Based) 20% Infusion: 457.6 mL    Oral Fluid: 720 mL    TPN (Total Parenteral Nutrition): 1744 mL  Total IN: 2921.6 mL    OUT:    Drain (mL): 1425 mL    Drain (mL): 15 mL    Voided (mL): 1480 mL  Total OUT: 2920 mL    Total NET: 1.6 mL      08 Feb 2024 07:01  -  08 Feb 2024 19:03  --------------------------------------------------------  IN:    PRBCs (Packed Red Blood Cells): 300 mL  Total IN: 300 mL    OUT:    Drain (mL): 950 mL    Voided (mL): 520 mL  Total OUT: 1470 mL    Total NET: -1170 mL          General: NAD, resting comfortably in bed  C/V: NSR  Pulm: Nonlabored breathing, no respiratory distress  Abd: soft, NT/ND. Wound manager in place without evident bleeding. Perc cony tube capped. Fistula with bilious/enteric output.  Extrem: WWP, no edema, SCDs in place      LABS:                        7.5    7.59  )-----------( 152      ( 08 Feb 2024 05:30 )             24.2     02-07    139  |  104  |  41<H>  ----------------------------<  108<H>  4.5   |  30  |  1.17    Ca    8.2<L>      07 Feb 2024 05:30  Phos  4.2     02-07  Mg     2.2     02-07    TPro  8.1  /  Alb  2.2<L>  /  TBili  4.4<H>  /  DBili  2.9<H>  /  AST  108<H>  /  ALT  62<H>  /  AlkPhos  122<H>  02-07      Urinalysis Basic - ( 07 Feb 2024 05:30 )    Color: x / Appearance: x / SG: x / pH: x  Gluc: 108 mg/dL / Ketone: x  / Bili: x / Urobili: x   Blood: x / Protein: x / Nitrite: x   Leuk Esterase: x / RBC: x / WBC x   Sq Epi: x / Non Sq Epi: x / Bacteria: x        RADIOLOGY & ADDITIONAL STUDIES:

## 2024-02-09 NOTE — CHART NOTE - NSCHARTNOTEFT_GEN_A_CORE
Admitting Diagnosis:   Patient is a 78y old  Male who presents with a chief complaint of SBO (2024 14:41)      PAST MEDICAL & SURGICAL HISTORY:  Essential hypertension  Hypertension      Atrial fibrillation  s/p cardioversion  and   Pt. reports 4 DCCV  Now on Amiodarone 200mg PO bid and Eliquis 5mg PO bid  Last DCCV 4 yrs ago at [facility]      Crohn's disease  s/p partial resection of ileum      Hyperlipidemia      Hypothyroidism      History of depression  On Venlafaxine ER 150mg PO bid      Junctional rhythm      Bradycardia      H/O knee surgery      History of cataract surgery          Current Nutrition Order:  TPN via PICC- 1.6L bag running over 12hrs [@80ml/hr], providing 250g Dex, 112g AA, 50g SMOF lipids daily; provides 1798 kcals, 112g protein daily, GIR 3.62 mg/kg/min  PO- Regular diet + 2 LPS per day [provide 100 kcals, 15g protein each], + Ensure Max daily [150 kcals, 20g protein]    PO Intake: Good (%) [   ]  Fair (50-75%) [  X ] Poor (<25%) [   ]    GI Issues:   24: ileostomy output 10cc x 24hrs, abdominal wound/fistula output 1200cc x 24hrs, per cony output 0cc x 24hrs [capped , uncapped 1/15, recapped ]  24: ileostomy output 0cc x 24hrs, abdominal wound/fistula output 425cc x 24hrs, per cony output 0cc x 24hrs [capped , uncapped 1/15, recapped ]  24: ileostomy output 5cc x 24hrs, abdominal wound/fistula output 1175cc x 24hrs, per cony output 0cc x 24hrs [capped , uncapped 1/15, recapped ]  24: ileostomy output 25cc x 24hrs, abdominal wound/fistula output 1925cc x 24hrs, per cony output 0cc x 24hrs [capped , uncapped 1/15, recapped ]  24: ileostomy output 25cc x 24hrs, abdominal wound/fistula output 1700cc x 24hrs, per cony output 0cc x 24hrs [capped , uncapped 1/15, recapped ]  24: ileostomy output 60cc x 24hrs, abdominal wound/fistula output 1375cc x 24hrs, per cony output 1025cc x 24hrs [capped , uncapped 1/15]  24: ileostomy output 0cc x 24hrs, abdominal wound/fistula output 1750cc x 24hrs, per cony output 0cc x 24hrs [capped ]  24: noted brownish output from [id number], total amount not documented; abdominal wound/fistula output 2285cc x 24hrs, per cony output 0cc x 24hrs [capped ]  1/3/24:  ileostomy output 0cc x 24hrs, abdominal wound/fistula output 2550cc x 24hrs, per cony output 275cc x 24hrs  : ileostomy output 50cc x 24hrs, abdominal wound/fistula output 910cc x 24hrs, per cony output 1225cc x 24hrs  : ileostomy output 50cc x 24hrs, abdominal wound/fistula output 1175cc x 24hrs, per cony output 1050cc x 24hrs  : ileostomy output 50cc x 24hrs, abdominal wound/fistula output 1100cc x 24hrs, per cony output 1050cc x 24hrs     : ileostomy output 20cc x 24hrs, abdominal wound/fistula output 1275cc x 24hrs, per cony output 775cc x 24hrs    : ileostomy output 15cc x 24hrs, abdominal wound/fistula output  1230cc x 24hrs, per cony output 1010cc x 24hrs    : ileostomy output 75cc x 24hrs, abdominal wound/fistula output  600cc x 24hrs, per cony output 1125cc x 24hrs    : ileostomy output 35cc x 24hrs, abdominal wound/fistula output  250cc x 24hrs, per cony output 745cc x 24hrs    : ileostomy output 30cc x 24hrs, abdominal wound/fistula output  2400cc x 24hrs, per cony output 550cc x 24hrs   : ileostomy output 50 cc x 24hrs, abdominal wound/fistula output 2175 cc x 24hrs, per cony output 530 cc x 24hrs   : ileostomy output 25cc x 24hrs, abdominal wound/fistula output 2350 cc x 24hrs, per cony output 775cc x 24hrs     Pain: no pain/discomfort noted    Skin Integrity: generalized jaundice, abd wound and fistula with wound manager in place, RLQ ileostomy, perc cony drain [now capped]. Rudy score 19    I&Os:   24 @ 07:01  -  24 @ 07:00  --------------------------------------------------------  IN: 2522.4 mL / OUT: 2605 mL / NET: -82.6 mL    24 @ 07:01  -  24 @ 14:31  --------------------------------------------------------  IN: 464 mL / OUT: 500 mL / NET: -36 mL        Labs:       134<L>  |  102  |  36<H>  ----------------------------<  128<H>  4.1   |  26  |  1.16    Ca    7.7<L>      2024 04:50  Phos  3.5       Mg     1.9         TPro  7.4  /  Alb  1.8<L>  /  TBili  4.0<H>  /  DBili  2.7<H>  /  AST  109<H>  /  ALT  60<H>  /  AlkPhos  115      CAPILLARY BLOOD GLUCOSE          Medications:  MEDICATIONS  (STANDING):  chlorhexidine 2% Cloths 1 Application(s) Topical <User Schedule>  cholestyramine Powder (Sugar-Free) 4 Gram(s) Oral daily  enoxaparin Injectable 40 milliGRAM(s) SubCutaneous every 24 hours  epoetin matt-epbx (RETACRIT) Injectable 10090 Unit(s) SubCutaneous every 7 days  ergocalciferol 71729 Unit(s) Oral <User Schedule>  gabapentin 100 milliGRAM(s) Oral at bedtime  glucagon  Injectable 1 milliGRAM(s) IntraMuscular once  levothyroxine 37.5 MICROGram(s) Oral daily  lipid, fat emulsion (Fish Oil and Plant Based) 20% Infusion 0.54 Gm/kG/Day (20.83 mL/Hr) IV Continuous <Continuous>  LORazepam     Tablet 0.5 milliGRAM(s) Oral at bedtime  metoprolol tartrate 50 milliGRAM(s) Oral every 12 hours  Parenteral Nutrition - Adult 1 Each TPN Continuous <Continuous>  Parenteral Nutrition - Adult 1 Each TPN Continuous <Continuous>  teduglutide Injectable 4.7 milliGRAM(s) SubCutaneous every 24 hours  ursodiol Suspension 300 milliGRAM(s) Oral every 8 hours  venlafaxine XR. 225 milliGRAM(s) Oral daily    MEDICATIONS  (PRN):  chlorhexidine 0.12% Liquid 15 milliLiter(s) Swish and Spit two times a day PRN oral hygeine  EPINEPHrine   1 mG/mL (1:1,000) Topical Solution 1 Application(s) Topical every 24 hours PRN for wound bleeding  LORazepam     Tablet 0.25 milliGRAM(s) Oral every 12 hours PRN Anxiety  melatonin 5 milliGRAM(s) Oral at bedtime PRN Insomnia  silver nitrate Applicator 1 Application(s) Topical once PRN wound edge bleeding  sodium chloride 0.9% lock flush 10 milliLiter(s) IV Push every 1 hour PRN Pre/post blood products, medications, blood draw, and to maintain line patency  sodium chloride 0.9% lock flush 10 milliLiter(s) IV Push every 1 hour PRN Pre/post blood products, medications, blood draw, and to maintain line patency    Daily Weight in k.9kg [2024]  Daily 96kg [2024]  Daily 99.1kg [2024]   Daily 94kg [2024]  Daily 93.3kg [19 Dec 2023]  Daily 93.3kg [11 Dec 2023]  Daily 93.3kg [08 Dec 2023]  Daily 93.3kg [6 Dec 2023]  Daily 94.2kg [2023]  Daily 94.4kg [2023]  Daily 94.2kg [2023]  Daily 94.2kg [2023]  Daily 94.2kg [2023]  Daily 94.2 [15 Nov 2023]  Daily 94.2kg [2023]  Daily 95.8kg [2023]  Daily 94.2kg [2023]  Daily 85.2kg [2023]  Daily 85.2kg [31 Oct 2023]  Daily 85.2kg [24 Oct 2023]  Daily 85.2kg [20 Oct 2023]  Daily 81.9kg [18 Oct 2023]  Daily 85.2kg [16 Oct 2023]  Daily   95.2kg [12 Oct 2023]   Daily 87.7kg [11 Oct 2023]  Daily 87.4kg [09 Oct 2023]  Daily 86.4kg [07 Oct 2023]  Daily 82.5kg [06 Oct 2023]  Daily 82.6kg [4 Oct 2023]   Daily 83.5kg [03 Oct 2023]  Daily 84.5kg [2 Oct 2023]  Daily 87.2kg [1 Oct 2023]  Daily 88.3kg [29 Sep 2023]  Daily 89.9kg [28 Sep 2023]  Daily 87.7kg [24 Sep 2023]  Daily 90.4kg [21 Sep 2023]  Daily 91.4kg [20 Sep 2023]  Daily 86.7kg [18 Sep 2023]  Daily 88.1kg [16 Sep 2023]  Daily 88.9kg [15 Sep 2023]   Daily 89.1 [14 Sep 2023]  Daily 92.9kg [6 Sep 2023]  Daily 91.8kg [27 Aug 2023]  Daily 101kg [9 Aug 2023]     Weight Change:  weight previously trending down, then up- now appears to be stable x ~2 months, recent weight gain likely secondary to increase in fluid provision/edema. Recommend continue weekly/daily weights for trending & ensuring adequacy of nutrition provision    IBW: 86.4kg  % IBW: 111.1%    Estimated energy needs:   Ideal body weight (86.4kg) used for calculations as pt >100% IBW and BMI <30 per [facility] Standards of Care. Needs estimated for age and adjusted for current clinical status, increased needs for post-op & open abd wound healing    Calories: 9722-1941 kcals based on 25-35 kcals/kg  Protein: 129.6-172.8 g protein based on 1.5-2.0g protein/kg + additional depending on outputs  *Fluid needs per team    Subjective:   78M w PMH Crohn's, AFib/Flutter s/p DCCVs on amiodarone, remote ileocectomy and open appendectomy. Admitted () for SBO vs Crohns flare, s/p NGT decompression and s/p lap converted to open TRE, SBR x 3, left in discontinuity with abthera vac on (), RTOR for ileocolic resection, small bowel anastomosis, and abdominal wall closure on (), c/b fluid collection s/p IR aspiration of perihepatic fluid on (7/3), c/b wound dehiscence s/p RTOR exlap, washout, ileocolic resection with end ileostomy, blow hole colostomy, red rubber from ileostomy to small bowel anastomosis; vicryl bridging mesh on () transferred to SICU postoperatively for hemodynamic monitoring, with hospital course complicated by periods of severe ELVI and hyponatremia, which resolved but stepped back up for to SICU on (9/10) for acute AMS, intermittent hypoglycemia, AFib with RVR. Percutaneous Cholecystostomy placed on () for enlarged GB, PCT capped 10/5 and uncapped 10/25 for hyperbilirubinemia, Right brachial DVT, left basillic and cephalic superficial thrombus on duplex , CT  with ALDEN ground glass opacity of unclear etiology, completed empiric 7day cefepime course , and another course of 7day cefepime for PNA  (1/15-23). rising T-bili of unclear etiology, resolved candida glabrata fungemia, ELIV improving.     Chart reviewed. Discussed with IDT today during AM rounds. 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 Gattex initiated , plan to monitor weights, outputs, wound healing, labs for absorption & adjust TPN provision prn to prevent over/underfeeding. TPN currently providing ~80% estimated kcal & protein needs. Continue providing TPN over 12 hours given lower amount of substrates in bag than previous- goal for improvement in LFTs, will continue to adjust based on substrate requirements and GIR. Providing zinc in TPN bag on MWF schedule. Checking copper, selenium, zinc, vit D levels monthly. Noted with semi-formed fecal output from fistula- ? increased absorption. Labs reviewed-  [2], Na 134 <L> [monitor fluid/volume status], BUN 46 <H> [improving], Creat WNL, Albumin 1.8 <L>, total bilirubin 4.0 <H> [improving], direct bilirubin 2.7 <H> [improving]. Meds- synthriod, gattex, 50,000 units ergocalciferol, EPO, ursodiol, cholestyramine. RDN will continue to monitor, reassess, and intervene as appropriate. Diet education provided to pt on current diet, TPN provision and monitoring upon discharge. PO high protein foods encouraged, all questions answered. RDN will continue to monitor, reassess, and intervene as appropriate.     Previous Nutrition Diagnosis:  1. Increased Nutrient Needs R/T physiological demands for nutrient AEB post-op wound healing  Active [ X ]  Resolved [   ]    2. Altered GI function R/T extensive GI history, insufficient bowel, high output fistula AEB supplemental PN requirement to meet nutrition needs  Active [ X  ]  Resolved [   ]    If resolved, new PES:     Goal:  Pt will meet at least 75% of protein & energy needs via most appropriate route for nutrition     Recommendations:  1. c/w TPN via PICC, 1.6L over 12 hrs - 250g Dex, 112g protein, 50g SMOF lipids; provides 1798 kcals, 112g protein, GIR 3.62 mg/kg/min [20.8 kcals/kg, 15.6 non-protein kcals/kg, 1.3g protein/kg IBW]  - provides ~83% low end kcal needs & 86.4% low end of protein needs  - fluids & lytes per team  - MVI, thiamine, vit C in bag, increase selenium as able  - c/w zinc 3x per week -- check levels monthly [next ]  - copper & selenium in bag-- continue checking monthly [next ]  - close monitoring of weights, fistula outputs, labs, wound healing, urine outputs & consider UUN & fecal fat studies to monitor changes in PO absorption --- adjust substrates in TPN bag as indicated to prevent over/underfeeding --> keep GIR <4-5 mg/kg/min  2. c/w Regular diet as medically feasible to allow for more menu options  - c/w 1 LPS BID [200 kcals, 30g protein] + 1 Ensure Max daily [150 kcals, 30g protein] as amenable  - protein foods encouraged  3. Weekly lipid panel while on TPN   - hold/decrease lipids if TG >400  - continue to trend LFTs  4. c/w Vit D supplementation, recheck levels in 4 weeks-- next   5. Recommend weekly weights for trending/ensuring adequacy of nutrition provision  - if large gain or loss, consider adjusting TPN provision  6. Hydration per team  - risk for dehydration with high outputs, additional fluids prn  7. Monitor BMP/Mg/Phos, POC BG while on TPN  - monitor & replenish lytes outside TPN bag prn  8. Continue close monitoring of clinical course & adjust recommendations prn    Education: ongoing diet education provided, emphasis on consuming protein foods     Risk Level: High [ X  ] Moderate [   ] Low [   ]

## 2024-02-09 NOTE — PROGRESS NOTE ADULT - ASSESSMENT
78M w PMH Crohn's, AFib/Flutter s/p DCCVs on amiodarone, remote ileocectomy and open appendectomy. Admitted (6/23) for SBO vs Crohns flare, s/p NGT decompression and s/p lap converted to open TRE, SBR x 3, left in discontinuity with abthera vac on (6/27), RTOR for ileocolic resection, small bowel anastomosis, and abdominal wall closure on (6/28), c/b fluid collection s/p IR aspiration of perihepatic fluid on (7/3), c/b wound dehiscence s/p RTOR exlap, washout, ileocolic resection with end ileostomy, blow hole colostomy, red rubber from ileostomy to small bowel anastomosis; vicryl bridging mesh on (7/5) transferred to SICU postoperatively for hemodynamic monitoring, with hospital course complicated by periods of severe ELVI and hyponatremia, which resolved but stepped back up for to SICU on (9/10) for acute AMS, intermittent hypoglycemia, AFib with RVR. Percutaneous Cholecystostomy placed on (9/11) for enlarged GB, PCT capped 10/5 and uncapped 10/25 for hyperbilirubinemia, Right brachial DVT, left basillic and cephalic superficial thrombus on duplex 11/2, CT 11/14 with ALDEN ground glass opacity of unclear etiology, completed empiric 7day cefepime course 11/22, and another course of 7day cefepime for PNA  (1/15-23), hyperbilirubinemia of unclear etiology, resolved candida glabrata fungemia, ELVI resolved, second episode of sinus pause--pacemaker placed (2/5). cystic duct open, Perc deb removed 2/8    NEURO: Hx of depression: cont Effexor 150 QD.  Anxiety: Lorazepam PRN. Holistic consult for anxiety and insomnia. Gabapentin for restless leg syndrome.   HEENT: Timolol eye gtt added on 1/10 for additional systemic BB support in setting of malabsorption  CV: Fluid overload resolved.  Hx of Afib and recent Aflutter alternating with sinus tachycardia (HR 100s): Changed to po Lopressor 50 q12. Cont timolol eye gtt for attempted systemic affect. Cont Lovenox. S/p previous DCCV, off Amiodarone and Lipitor due to persistent transaminitis. BLE duplex (1/5) no DVT, TTE  (7/18) - PASP 64mmHg, EF 65-70%. Holding Losartan & norvasc, TTE (1/4) LVEF 75%, mild/mod AR, PASP 40, RVEF wnl. S/p PPM 2/5.   PULM: Atelectasis/dyspnea improved clinically: cont room air, will dc Bipap. PRN duonebs for wheezing. Encourage OOB and IS.   GI/FEN: Low res, low fat, high protein diet, Ensure Max. 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 Continue TPN/lipids (now running over 12 hours), Cont Octreotide & Cholestyramine 10/18. Transaminitis slowly improved, elevated T bili, s/p Perc Deb on 9/11, uncapped on 1/15, recap on (1/17), removed on 2/8, Seen by Hepatology - MRCP 10/30 no obstruction, cont ursodiol, RUQ US 11/25 CBD 5mm, hepatic vessels patent. Cont Vit D weekly.   : Voids. ELVI improved: stopped famotidine given renal dysfunction. MARLO Duplex and RP US unremarkable, CK wnl.    ENDO: Hx hypothyroid: Cont Synthroid,  Repeat thyroid studies WNL 1/3/24.   ID: currently not on any abx. Completed cefepime for PNA (1/15-23) BCx 11/22 grew candida glabrata, likely CLABSI. s/p Caspo & completed Fluconazole course (12/1-12/9). Ophthalmology evaluated - normal ocular exam. Echo no vegetation. PICC replaced on 12/4. Cont Nystatin powder. // DC: caspofungin (11/24-12/1). empiric 7days cefepime (11/15-11/22), C. tertium, Lactobacillus from IR cx 7/3, and candida albicans, lactobacillus, vanc sensitive E faecium, vanc resistant E gallinarum, vanc resistant E casseliflavus, lactobacillus from OR cx 7/5. Completed course of abx with Imipenem (9/10-9/15). Imipenem (8/26--) imipenem (6/30-7/12, 7/23-7/30), Dapto (6/30-7/5 and 7/23-7/24). Empiric Dapto (8/23-24) and Cefepime (8/23-24).   PPX: SCDs, currently on PPX lovenox given hx of thrombosis.   HEME: Anemia - continue Epogen weekly. Previously treated with Iron and intermittent transfusions.   LINES: PIVs,  new LUE placed (2/7--) will plan to switch PICC once a month in setting of fungemia history,  // DC: LUE PICC (9/1-11/1 ), RUE PICC: (11/1-11/24), LUE PICC (12/4-1/5) RUE PICC placed (1/5-2/7)   WOUNDS/DRAINS: Abdominal wound and fistula with wound manager in place dressing change daily. Had recurrent punctate bleeding at wound bed, s/p placed surgicel, attempt to stitch, electrocautery and epi soaked gauze. Cont epinephrine soaked gauze at bedside as needed for wound bleeding. RLQ Ostomy. IR perc deb tube recapped 1/17, IR tube study yesterday patent cystic duct, perc deb removed yesterday (2/7)  // DC: L Clay drain.  PT: Ambulate as tolerated OOB to chair daily. Repeat PT consult orderd on 2/2.   DISPO: SICU due to complicated hospital course. Cont Labs every other day. Patient will need a Semi-electric hospital bed and gel overlay and side rails for home use. Patients head needs to be elevated 30 degrees to prevent aspirations and wedges and pillows have been tried and failed. Patient is unable to make frequent changes to body position on their own. The member can independently affect the adjustment by operating the controls. Length of need is 36 months. will need rolling walker.   78M w PMH Crohn's, AFib/Flutter s/p DCCVs on amiodarone, remote ileocectomy and open appendectomy. Admitted (6/23) for SBO vs Crohns flare, s/p NGT decompression and s/p lap converted to open TRE, SBR x 3, left in discontinuity with abthera vac on (6/27), RTOR for ileocolic resection, small bowel anastomosis, and abdominal wall closure on (6/28), c/b fluid collection s/p IR aspiration of perihepatic fluid on (7/3), c/b wound dehiscence s/p RTOR exlap, washout, ileocolic resection with end ileostomy, blow hole colostomy, red rubber from ileostomy to small bowel anastomosis; vicryl bridging mesh on (7/5) transferred to SICU postoperatively for hemodynamic monitoring, with hospital course complicated by periods of severe ELVI and hyponatremia, which resolved but stepped back up for to SICU on (9/10) for acute AMS, intermittent hypoglycemia, AFib with RVR. Percutaneous Cholecystostomy placed on (9/11) for enlarged GB, PCT capped 10/5 and uncapped 10/25 for hyperbilirubinemia, Right brachial DVT, left basillic and cephalic superficial thrombus on duplex 11/2, CT 11/14 with ALDEN ground glass opacity of unclear etiology, completed empiric 7day cefepime course 11/22, and another course of 7day cefepime for PNA  (1/15-23), hyperbilirubinemia of unclear etiology, resolved candida glabrata fungemia, ELVI resolved, second episode of sinus pause--pacemaker placed (2/5). cystic duct open, Perc deb removed 2/8    NEURO: Hx of depression: cont Effexor 150 QD.  Anxiety: Lorazepam PRN. Holistic consult for anxiety and insomnia. Gabapentin for restless leg syndrome.   HEENT: Timolol eye gtt added on 1/10 for additional systemic BB support in setting of malabsorption  CV: Fluid overload resolved.  Hx of Afib and recent Aflutter alternating with sinus tachycardia (HR 100s): Changed to po Lopressor 50 q12. Cont timolol eye gtt for attempted systemic affect. Cont Lovenox. S/p previous DCCV, off Amiodarone and Lipitor due to persistent transaminitis. BLE duplex (1/5) no DVT, TTE  (7/18) - PASP 64mmHg, EF 65-70%. Holding Losartan & norvasc, TTE (1/4) LVEF 75%, mild/mod AR, PASP 40, RVEF wnl. S/p PPM 2/5.   PULM: Atelectasis/dyspnea improved clinically: cont room air, will dc Bipap. PRN duonebs for wheezing. Encourage OOB and IS.   GI/FEN: Low res, low fat, high protein diet, Ensure Max. 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 Continue TPN/lipids (now running over 12 hours), Cont Octreotide & Cholestyramine 10/18. Transaminitis slowly improved, elevated T bili, s/p Perc Deb on 9/11, uncapped on 1/15, recap on (1/17), removed on 2/8, Seen by Hepatology - MRCP 10/30 no obstruction, cont ursodiol, RUQ US 11/25 CBD 5mm, hepatic vessels patent. Cont Vit D weekly.   : Voids. ELVI improved: stopped famotidine given renal dysfunction. MARLO Duplex and RP US unremarkable, CK wnl.    ENDO: Hx hypothyroid: Cont Synthroid,  Repeat thyroid studies WNL 1/3/24.   ID: currently not on any abx. Completed cefepime for PNA (1/15-23) BCx 11/22 grew candida glabrata, likely CLABSI. s/p Caspo & completed Fluconazole course (12/1-12/9). Ophthalmology evaluated - normal ocular exam. Echo no vegetation. PICC replaced on 12/4. Cont Nystatin powder. // DC: caspofungin (11/24-12/1). empiric 7days cefepime (11/15-11/22), C. tertium, Lactobacillus from IR cx 7/3, and candida albicans, lactobacillus, vanc sensitive E faecium, vanc resistant E gallinarum, vanc resistant E casseliflavus, lactobacillus from OR cx 7/5. Completed course of abx with Imipenem (9/10-9/15). Imipenem (8/26--) imipenem (6/30-7/12, 7/23-7/30), Dapto (6/30-7/5 and 7/23-7/24). Empiric Dapto (8/23-24) and Cefepime (8/23-24).   PPX: SCDs, currently on PPX lovenox given hx of thrombosis.   HEME: Anemia - continue Epogen weekly. Previously treated with Iron and intermittent transfusions.   LINES: PIVs,  new LUE placed (2/7--) will plan to switch PICC once a month in setting of fungemia history,  // DC: LUE PICC (9/1-11/1 ), RUE PICC: (11/1-11/24), LUE PICC (12/4-1/5) RUE PICC placed (1/5-2/7)   WOUNDS/DRAINS: Abdominal wound and fistula with wound manager in place dressing change daily. Had recurrent punctate bleeding at wound bed, s/p placed surgicel, attempt to stitch, electrocautery and epi soaked gauze. Cont epinephrine soaked gauze at bedside as needed for wound bleeding. RLQ Ostomy. IR perc deb tube recapped 1/17, IR tube study patent cystic duct, perc deb removed (2/7)  // DC: L Clay drain.  PT: Ambulate as tolerated OOB to chair daily. Repeat PT consult orderd on 2/2.   DISPO: SICU due to complicated hospital course. Cont Labs every other day. Patient will need a Semi-electric hospital bed and gel overlay and side rails for home use. Patients head needs to be elevated 30 degrees to prevent aspirations and wedges and pillows have been tried and failed. Patient is unable to make frequent changes to body position on their own. The member can independently affect the adjustment by operating the controls. Length of need is 36 months. will need rolling walker.

## 2024-02-09 NOTE — CHART NOTE - NSCHARTNOTESELECT_GEN_ALL_CORE
Anti-infective approval note/Event Note
Event Note
Follow Up/Nutrition Services
Follow Up/Nutrition Services
Nutrition Follow Up/Nutrition Services
Nutrition Services
TATE/Event Note
anti-infective approval/Event Note
Anti-infective approval/Event Note
CXR review/Event Note
Event Note
Follow Up/Nutrition Services
Interventional Radiology/Event Note
Nutrition Follow Up/Nutrition Services
Nutrition Services
Off Service Note
PICC Team/Off Service Note
Right pneumothorax/Event Note
Serial abdominal exam/Event Note
anti-infective approval/Event Note
serial abdominal exam/Event Note
Follow Up/Nutrition Services
Nutrition Services
Off Service Note

## 2024-02-09 NOTE — PROGRESS NOTE ADULT - ASSESSMENT
M w PMH Crohn's, AFib/Flutter s/p DCCVs on amiodarone, remote ileocectomy and open appendectomy. Admitted (6/23) for SBO vs Crohns flare, s/p NGT decompression and s/p lap converted to open TRE, SBR x 3, left in discontinuity with abthera vac on (6/27), RTOR for ileocolic resection, small bowel anastomosis, and abdominal wall closure on (6/28), c/b fluid collection s/p IR aspiration of perihepatic fluid on (7/3), c/b wound dehiscence s/p RTOR exlap, washout, ileocolic resection with end ileostomy, blow hole colostomy, red rubber from ileostomy to small bowel anastomosis; vicryl bridging mesh on (7/5) transferred to SICU postoperatively for hemodynamic monitoring, with hospital course complicated by periods of severe ELVI and hyponatremia, which resolved but stepped back up for to SICU on (9/10) for acute AMS, intermittent hypoglycemia, AFib with RVR. Percutaneous Cholecystostomy placed on (9/11) for enlarged GB, PCT capped 10/5 and uncapped 10/25 for hyperbilirubinemia, Right brachial DVT, left basillic and cephalic superficial thrombus on duplex 11/2, CT 11/14 with ALDEN ground glass opacity of unclear etiology, completed empiric 7day cefepime course 11/22, and another course of 7day cefepime for PNA  (1/15-23). rising T-bili of unclear etiology, resolved candida glabrata fungemia.    Recs:  - Continue monitoring Hgb  - Recommend epinephrine-soaked gauze for bleeding from friable granulation tissue  Rest of care per SICU

## 2024-02-09 NOTE — PROGRESS NOTE ADULT - SUBJECTIVE AND OBJECTIVE BOX
SUBJECTIVE: Patient seen and examined at bedside. Patient without any acute complaints, he is tolerating his diet without nausea or vomiting,      enoxaparin Injectable 40 milliGRAM(s) SubCutaneous every 24 hours  metoprolol tartrate 50 milliGRAM(s) Oral every 12 hours      Vital Signs Last 24 Hrs  T(C): 36.9 (09 Feb 2024 12:00), Max: 37 (09 Feb 2024 10:00)  T(F): 98.5 (09 Feb 2024 12:00), Max: 98.6 (09 Feb 2024 10:00)  HR: 92 (09 Feb 2024 12:39) (90 - 95)  BP: 118/58 (09 Feb 2024 12:39) (118/58 - 154/70)  BP(mean): 84 (09 Feb 2024 12:39) (84 - 101)  RR: 27 (09 Feb 2024 12:39) (20 - 35)  SpO2: 95% (09 Feb 2024 12:39) (94% - 100%)    Parameters below as of 09 Feb 2024 12:39  Patient On (Oxygen Delivery Method): room air      I&O's Detail    08 Feb 2024 07:01  -  09 Feb 2024 07:00  --------------------------------------------------------  IN:    Fat Emulsion (Fish Oil &amp; Plant Based) 20% Infusion: 270.4 mL    PRBCs (Packed Red Blood Cells): 300 mL    TPN (Total Parenteral Nutrition): 1952 mL  Total IN: 2522.4 mL    OUT:    Drain (mL): 1200 mL    Drain (mL): 10 mL    Voided (mL): 1395 mL  Total OUT: 2605 mL    Total NET: -82.6 mL      09 Feb 2024 07:01  -  09 Feb 2024 15:46  --------------------------------------------------------  IN:    Oral Fluid: 240 mL    TPN (Total Parenteral Nutrition): 224 mL  Total IN: 464 mL    OUT:    Drain (mL): 350 mL    Voided (mL): 150 mL  Total OUT: 500 mL    Total NET: -36 mL            General: NAD, resting comfortably in bed  C/V: NSR  Pulm: Nonlabored breathing, no respiratory distress  Abd: soft, NT/ND, EAF with wound manager in place, ileostomy with no signs of bleeding  Extrem: WWP, no edema, SCDs in place          LABS:                        8.2    6.93  )-----------( 148      ( 09 Feb 2024 04:50 )             25.2     02-09    134<L>  |  102  |  36<H>  ----------------------------<  128<H>  4.1   |  26  |  1.16    Ca    7.7<L>      09 Feb 2024 04:50  Phos  3.5     02-09  Mg     1.9     02-09    TPro  7.4  /  Alb  1.8<L>  /  TBili  4.0<H>  /  DBili  2.7<H>  /  AST  109<H>  /  ALT  60<H>  /  AlkPhos  115  02-09      Urinalysis Basic - ( 09 Feb 2024 04:50 )    Color: x / Appearance: x / SG: x / pH: x  Gluc: 128 mg/dL / Ketone: x  / Bili: x / Urobili: x   Blood: x / Protein: x / Nitrite: x   Leuk Esterase: x / RBC: x / WBC x   Sq Epi: x / Non Sq Epi: x / Bacteria: x        RADIOLOGY & ADDITIONAL STUDIES:

## 2024-02-09 NOTE — ADVANCED PRACTICE NURSE CONSULT - ASSESSMENT
New Coloplast fistula/wound manager applied over site to mid abdomen. Pt's spouse and private duty RN present to observe. Old appliance removed gently with adhesive remover but 2 areas began to ooze blood. Pressure and silver nitrate sticks applied until bleeding stopped. Periwound area protected with Cavilon barrier wipes and stoma paste prior to placing appliance. Pediatric ostomy pouch applied over ileostomy site per patient request, Coloplast pouches available at bedside in case of leakage. Each step verbalized for spouse and private duty RN, including what to do in case of bleeding. Extra supplies at bedside for discharge.

## 2024-02-09 NOTE — PROGRESS NOTE ADULT - SUBJECTIVE AND OBJECTIVE BOX
S: No new issues/events overnight, no new med c/o    O: ICU Vital Signs Last 24 Hrs  T(F): 98.6 (02-09-24 @ 10:00), Max: 98.6 (02-09-24 @ 10:00)  HR: 95 (02-09-24 @ 09:05) (90 - 95)  BP: 135/63 (02-09-24 @ 09:05) (114/55 - 154/70)  BP(mean): 91 (02-09-24 @ 09:05) (78 - 101)  ABP: --  RR: 24 (02-09-24 @ 09:05) (19 - 35)  SpO2: 95% (02-09-24 @ 09:05) (94% - 100%)    PHYSICAL EXAM:   Neurological: AAOx3, CNII-XII intact,  strength 5/5 b/l  ENT: mucus membrane moist  Cardiovascular: RRR  Respiratory: CTA  Gastrointestinal: soft, NT, ND, BS+, no active bleeding from ileostomy, nor fistula. dark output in fistula bag, but no active bleeding seen.   Extremities: warm, dependent edema improved compared with yesterday exam.   Vascular: no cyanosis/erythema  Skin: no rashes  MSK: no joint swelling.       LABS:    02-09    134<L>  |  102  |  36<H>  ----------------------------<  128<H>  4.1   |  26  |  1.16    Ca    7.7<L>      09 Feb 2024 04:50  Phos  3.5     02-09  Mg     1.9     02-09    TPro  7.4  /  Alb  1.8<L>  /  TBili  4.0<H>  /  DBili  2.7<H>  /  AST  109<H>  /  ALT  60<H>  /  AlkPhos  115  02-09  LIVER FUNCTIONS - ( 09 Feb 2024 04:50 )  Alb: 1.8 g/dL / Pro: 7.4 g/dL / ALK PHOS: 115 U/L / ALT: 60 U/L / AST: 109 U/L / GGT: x                               8.2    6.93  )-----------( 148      ( 09 Feb 2024 04:50 )             25.2     Urinalysis Basic - ( 09 Feb 2024 04:50 )    Color: x / Appearance: x / SG: x / pH: x  Gluc: 128 mg/dL / Ketone: x  / Bili: x / Urobili: x   Blood: x / Protein: x / Nitrite: x   Leuk Esterase: x / RBC: x / WBC x   Sq Epi: x / Non Sq Epi: x / Bacteria: x    CAPILLARY BLOOD GLUCOSE        MEDICATIONS  (STANDING):  chlorhexidine 2% Cloths 1 Application(s) Topical <User Schedule>  cholestyramine Powder (Sugar-Free) 4 Gram(s) Oral daily  enoxaparin Injectable 40 milliGRAM(s) SubCutaneous every 24 hours  epoetin matt-epbx (RETACRIT) Injectable 19803 Unit(s) SubCutaneous every 7 days  ergocalciferol 79129 Unit(s) Oral <User Schedule>  gabapentin 100 milliGRAM(s) Oral at bedtime  glucagon  Injectable 1 milliGRAM(s) IntraMuscular once  levothyroxine 37.5 MICROGram(s) Oral daily  lipid, fat emulsion (Fish Oil and Plant Based) 20% Infusion 0.54 Gm/kG/Day (20.83 mL/Hr) IV Continuous <Continuous>  LORazepam     Tablet 0.5 milliGRAM(s) Oral at bedtime  metoprolol tartrate 50 milliGRAM(s) Oral every 12 hours  Parenteral Nutrition - Adult 1 Each TPN Continuous <Continuous>  Parenteral Nutrition - Adult 1 Each TPN Continuous <Continuous>  teduglutide Injectable 4.7 milliGRAM(s) SubCutaneous every 24 hours  ursodiol Suspension 300 milliGRAM(s) Oral every 8 hours  venlafaxine XR. 225 milliGRAM(s) Oral daily    MEDICATIONS  (PRN):  chlorhexidine 0.12% Liquid 15 milliLiter(s) Swish and Spit two times a day PRN oral hygeine  EPINEPHrine   1 mG/mL (1:1,000) Topical Solution 1 Application(s) Topical every 24 hours PRN for wound bleeding  LORazepam     Tablet 0.25 milliGRAM(s) Oral every 12 hours PRN Anxiety  melatonin 5 milliGRAM(s) Oral at bedtime PRN Insomnia  silver nitrate Applicator 1 Application(s) Topical once PRN wound edge bleeding  sodium chloride 0.9% lock flush 10 milliLiter(s) IV Push every 1 hour PRN Pre/post blood products, medications, blood draw, and to maintain line patency  sodium chloride 0.9% lock flush 10 milliLiter(s) IV Push every 1 hour PRN Pre/post blood products, medications, blood draw, and to maintain line patency      Poole:	  [ ] None	[ ] Daily Poole Order Placed	   Indication:	  [ ] Strict I and O's    [ ] Obstruction     [ ] Incontinence + Stage 3 or 4 Decubitus  Central Line:  [ ] None	   [ ]  Medication / TPN Administration     [ ] No Peripheral IV        S: No new issues/events overnight, no new med c/o    O: ICU Vital Signs Last 24 Hrs  T(F): 98.6 (02-09-24 @ 10:00), Max: 98.6 (02-09-24 @ 10:00)  HR: 95 (02-09-24 @ 09:05) (90 - 95)  BP: 135/63 (02-09-24 @ 09:05) (114/55 - 154/70)  BP(mean): 91 (02-09-24 @ 09:05) (78 - 101)  ABP: --  RR: 24 (02-09-24 @ 09:05) (19 - 35)  SpO2: 95% (02-09-24 @ 09:05) (94% - 100%)    PHYSICAL EXAM:   Neurological: AAOx3, CNII-XII intact,  strength 5/5 b/l  ENT: mucus membrane moist  Cardiovascular: RRR  Respiratory: CTA  Gastrointestinal: soft, NT, ND, BS+, no active bleeding from ileostomy, nor fistula. dark output in fistula bag, but no active bleeding seen.   Extremities: warm, dependent edema improved compared with yesterday exam.   Vascular: no cyanosis/erythema  Skin: no rashes  MSK: no joint swelling.       LABS:    02-09    134<L>  |  102  |  36<H>  ----------------------------<  128<H>  4.1   |  26  |  1.16    Ca    7.7<L>      09 Feb 2024 04:50  Phos  3.5     02-09  Mg     1.9     02-09    TPro  7.4  /  Alb  1.8<L>  /  TBili  4.0<H>  /  DBili  2.7<H>  /  AST  109<H>  /  ALT  60<H>  /  AlkPhos  115  02-09  LIVER FUNCTIONS - ( 09 Feb 2024 04:50 )  Alb: 1.8 g/dL / Pro: 7.4 g/dL / ALK PHOS: 115 U/L / ALT: 60 U/L / AST: 109 U/L / GGT: x                               8.2    6.93  )-----------( 148      ( 09 Feb 2024 04:50 )             25.2     Urinalysis Basic - ( 09 Feb 2024 04:50 )    Color: x / Appearance: x / SG: x / pH: x  Gluc: 128 mg/dL / Ketone: x  / Bili: x / Urobili: x   Blood: x / Protein: x / Nitrite: x   Leuk Esterase: x / RBC: x / WBC x   Sq Epi: x / Non Sq Epi: x / Bacteria: x    CAPILLARY BLOOD GLUCOSE        MEDICATIONS  (STANDING):  chlorhexidine 2% Cloths 1 Application(s) Topical <User Schedule>  cholestyramine Powder (Sugar-Free) 4 Gram(s) Oral daily  enoxaparin Injectable 40 milliGRAM(s) SubCutaneous every 24 hours  epoetin matt-epbx (RETACRIT) Injectable 19404 Unit(s) SubCutaneous every 7 days  ergocalciferol 50926 Unit(s) Oral <User Schedule>  gabapentin 100 milliGRAM(s) Oral at bedtime  glucagon  Injectable 1 milliGRAM(s) IntraMuscular once  levothyroxine 37.5 MICROGram(s) Oral daily  lipid, fat emulsion (Fish Oil and Plant Based) 20% Infusion 0.54 Gm/kG/Day (20.83 mL/Hr) IV Continuous <Continuous>  LORazepam     Tablet 0.5 milliGRAM(s) Oral at bedtime  metoprolol tartrate 50 milliGRAM(s) Oral every 12 hours  Parenteral Nutrition - Adult 1 Each TPN Continuous <Continuous>  Parenteral Nutrition - Adult 1 Each TPN Continuous <Continuous>  teduglutide Injectable 4.7 milliGRAM(s) SubCutaneous every 24 hours  ursodiol Suspension 300 milliGRAM(s) Oral every 8 hours  venlafaxine XR. 225 milliGRAM(s) Oral daily    MEDICATIONS  (PRN):  chlorhexidine 0.12% Liquid 15 milliLiter(s) Swish and Spit two times a day PRN oral hygeine  EPINEPHrine   1 mG/mL (1:1,000) Topical Solution 1 Application(s) Topical every 24 hours PRN for wound bleeding  LORazepam     Tablet 0.25 milliGRAM(s) Oral every 12 hours PRN Anxiety  melatonin 5 milliGRAM(s) Oral at bedtime PRN Insomnia  silver nitrate Applicator 1 Application(s) Topical once PRN wound edge bleeding  sodium chloride 0.9% lock flush 10 milliLiter(s) IV Push every 1 hour PRN Pre/post blood products, medications, blood draw, and to maintain line patency  sodium chloride 0.9% lock flush 10 milliLiter(s) IV Push every 1 hour PRN Pre/post blood products, medications, blood draw, and to maintain line patency      Poole:	  [x ] None	[ ] Daily Poole Order Placed	   Indication:	  [ ] Strict I and O's    [ ] Obstruction     [ ] Incontinence + Stage 3 or 4 Decubitus  Central Line:  [ ] None	   [x ]  Medication / TPN Administration     [ ] No Peripheral IV

## 2024-02-10 NOTE — PROGRESS NOTE ADULT - SUBJECTIVE AND OBJECTIVE BOX
SUBJECTIVE:  Patient was evaluated by general surgery team this AM. Patient was in good spirits this AM and is excited to be discharged next week. Patient has no acute complaints at this time.    MEDICATIONS  (STANDING):  chlorhexidine 2% Cloths 1 Application(s) Topical <User Schedule>  cholestyramine Powder (Sugar-Free) 4 Gram(s) Oral daily  enoxaparin Injectable 40 milliGRAM(s) SubCutaneous every 24 hours  epoetin matt-epbx (RETACRIT) Injectable 30226 Unit(s) SubCutaneous every 7 days  ergocalciferol 92632 Unit(s) Oral <User Schedule>  gabapentin 100 milliGRAM(s) Oral at bedtime  glucagon  Injectable 1 milliGRAM(s) IntraMuscular once  levothyroxine 50 MICROGram(s) Oral daily  lipid, fat emulsion (Fish Oil and Plant Based) 20% Infusion 0.54 Gm/kG/Day (20.83 mL/Hr) IV Continuous <Continuous>  LORazepam     Tablet 0.5 milliGRAM(s) Oral at bedtime  metoprolol tartrate 50 milliGRAM(s) Oral every 12 hours  Parenteral Nutrition - Adult 1 Each TPN Continuous <Continuous>  Parenteral Nutrition - Adult 1 Each TPN Continuous <Continuous>  teduglutide Injectable 4.7 milliGRAM(s) SubCutaneous every 24 hours  ursodiol Suspension 300 milliGRAM(s) Oral every 8 hours  venlafaxine XR. 225 milliGRAM(s) Oral daily    MEDICATIONS  (PRN):  chlorhexidine 0.12% Liquid 15 milliLiter(s) Swish and Spit two times a day PRN oral hygeine  EPINEPHrine   1 mG/mL (1:1,000) Topical Solution 1 Application(s) Topical every 24 hours PRN for wound bleeding  LORazepam     Tablet 0.25 milliGRAM(s) Oral every 12 hours PRN Anxiety  melatonin 5 milliGRAM(s) Oral at bedtime PRN Insomnia  silver nitrate Applicator 1 Application(s) Topical once PRN wound edge bleeding  sodium chloride 0.9% lock flush 10 milliLiter(s) IV Push every 1 hour PRN Pre/post blood products, medications, blood draw, and to maintain line patency  sodium chloride 0.9% lock flush 10 milliLiter(s) IV Push every 1 hour PRN Pre/post blood products, medications, blood draw, and to maintain line patency      Vital Signs Last 24 Hrs  T(C): 36.4 (10 Feb 2024 08:00), Max: 36.8 (09 Feb 2024 18:26)  T(F): 97.6 (10 Feb 2024 08:00), Max: 98.2 (09 Feb 2024 18:26)  HR: 94 (10 Feb 2024 07:30) (91 - 94)  BP: 137/61 (10 Feb 2024 07:30) (118/58 - 144/67)  BP(mean): 88 (10 Feb 2024 07:30) (84 - 104)  RR: 21 (10 Feb 2024 07:30) (20 - 33)  SpO2: 95% (10 Feb 2024 07:30) (95% - 99%)    Parameters below as of 10 Feb 2024 07:30  Patient On (Oxygen Delivery Method): room air        Physical Exam:  General: NAD, resting comfortably in bed  C/V: NSR  Pulm: Nonlabored breathing, no respiratory distress  Abd: soft, NT/ND, EAF with wound manager in place, ileostomy with no signs of bleeding  Extrem: WWP, no edema, SCDs in place    I&O's Summary    09 Feb 2024 07:01  -  10 Feb 2024 07:00  --------------------------------------------------------  IN: 2883.2 mL / OUT: 2875 mL / NET: 8.2 mL        LABS:                        7.5    9.43  )-----------( 175      ( 10 Feb 2024 12:19 )             24.0     02-09    134<L>  |  102  |  36<H>  ----------------------------<  128<H>  4.1   |  26  |  1.16    Ca    7.7<L>      09 Feb 2024 04:50  Phos  3.5     02-09  Mg     1.9     02-09    TPro  7.4  /  Alb  1.8<L>  /  TBili  4.0<H>  /  DBili  2.7<H>  /  AST  109<H>  /  ALT  60<H>  /  AlkPhos  115  02-09      Urinalysis Basic - ( 09 Feb 2024 04:50 )    Color: x / Appearance: x / SG: x / pH: x  Gluc: 128 mg/dL / Ketone: x  / Bili: x / Urobili: x   Blood: x / Protein: x / Nitrite: x   Leuk Esterase: x / RBC: x / WBC x   Sq Epi: x / Non Sq Epi: x / Bacteria: x      CAPILLARY BLOOD GLUCOSE        LIVER FUNCTIONS - ( 09 Feb 2024 04:50 )  Alb: 1.8 g/dL / Pro: 7.4 g/dL / ALK PHOS: 115 U/L / ALT: 60 U/L / AST: 109 U/L / GGT: x             RADIOLOGY & ADDITIONAL STUDIES:

## 2024-02-10 NOTE — PROGRESS NOTE ADULT - NS_MD_PANP_GEN_ALL_CORE

## 2024-02-10 NOTE — PROGRESS NOTE ADULT - ASSESSMENT
78M w/ Crohn's, AFib/Flutter s/p DCCVs on amiodarone, remote ileocectomy and open appy here for SBO vs Crohns flare, s/p NGT decompression and now s/p lap TRE converted to open TRE, SBR x 3, left in discontinuity with abthera vac on 6/27, RTOR for ileocolic resection, small bowel anastomosis, and abdominal wall closure on 6/28, and s/p IR aspiration of perihepatic fluid on 7/3, stepped down to telemetery on 7/4. wound dehisence on 7/5, RTOR exlap, washout, ileocolic resection with end ileostomy, blow hole colostomy, red rubber from ileostomy to small bowel anastomosis; vicryl bridging mesh on 7/5. Transferred to SICU post op for HD monitoring. Extubated 7/6. Now w/ high output EC fistula.    Neuro: Pain well controlled. Hx anxiety: Continue home venlafaxine  CV: MAP > 65. Hx of Afib/flutter: s/p DCCV, on PO amiodarone, metoprolol. Hx of HTN- continue home losartan, new amlodipine 10 qd. IV Hydralazine PRN for SBP < 140 q4h. Hx of HLD: continue home rosuvastatin; Repeat TTE  (07/18) - PASP 64mmHg, EF 65-70%%. Previously total body overloaded - now euvolemic  Pulm: Extubated 7/6 - now on RA, saturating well. s/p Chest tube by CT surg (6/27--7/3)   GI: Continue CLD. TPN/lipids, UUN slightly catabolic, increased amino acids to 2g. Wound dehisense -- RTOR on 7/5 for exlap, washout, ileocolic resection with end ileostomy, blow hole colostomy; vicryl bridging mesh explanted and vac replaced 2 x week, vac cannisters changed q6h- Continue Octreotide and cholestyramine. Monitoring LFTs daily  : Voids.   ID: Blood Cx sent 7/23 - NGTD. Empiric coverage with imipenem ((7/23-7/30) -- ID signed off Dapto (7/23--7/24) IR Cx 7/3: C. tertium, Lactobacillis. OR Cx 7/5 - rare yeast - Imipenem (6/30-7/12), Fluconazole (6/30 -7/12) OR Cx 6/28: Vanc-resistant/amp-sens (but allergic) enterococcus s/p CTX (6/26-6/30), Flagyl (6/26-6/30), Dapto (6/30-7/5).  Endo: ISS; Hx of hypothyroid: cont synthroid. Gout: holding home allopurionol  PPx: SCD, HSQ.  Lines: PIV x 2, RUE PICC (6/30--), Victoria (7/8-7/10) // DC: R Chest tube for iatrogenic PTX (6/27--7/3), R TLC (06/26-30). R TLC (7/5 -7/12)  Wounds/Drains: midline incision; wound vac changes q48h with fistula opening; R JOYCE below ileostomy (D/C 7/17), L JOYCE at small bowel enterotomy repair - to LIWS.  PT/OT: CONRADO  Dispo: SICU   - - -

## 2024-02-10 NOTE — PROGRESS NOTE ADULT - ASSESSMENT
78M w PMH Crohn's, AFib/Flutter s/p DCCVs on amiodarone, remote ileocectomy and open appendectomy. Admitted (6/23) for SBO vs Crohns flare, s/p NGT decompression and s/p lap converted to open TRE, SBR x 3, left in discontinuity with abthera vac on (6/27), RTOR for ileocolic resection, small bowel anastomosis, and abdominal wall closure on (6/28), c/b fluid collection s/p IR aspiration of perihepatic fluid on (7/3), c/b wound dehiscence s/p RTOR exlap, washout, ileocolic resection with end ileostomy, blow hole colostomy, red rubber from ileostomy to small bowel anastomosis; vicryl bridging mesh on (7/5) transferred to SICU postoperatively for hemodynamic monitoring, with hospital course complicated by periods of severe ELVI and hyponatremia, which resolved but stepped back up for to SICU on (9/10) for acute AMS, intermittent hypoglycemia, AFib with RVR. Percutaneous Cholecystostomy placed on (9/11) for enlarged GB, PCT capped 10/5 and uncapped 10/25 for hyperbilirubinemia, Right brachial DVT, left basillic and cephalic superficial thrombus on duplex 11/2, CT 11/14 with ALDEN ground glass opacity of unclear etiology, completed empiric 7day cefepime course 11/22, and another course of 7day cefepime for PNA  (1/15-23), hyperbilirubinemia of unclear etiology, resolved candida glabrata fungemia, ELVI resolved, second episode of sinus pause--pacemaker placed (2/5). cystic duct open, Perc deb removed 2/8    NEURO: Hx of depression: cont Effexor 150 QD.  Anxiety: Lorazepam PRN. Holistic consult for anxiety and insomnia. Gabapentin for restless leg syndrome.   HEENT: Timolol eye gtt stopped 2/9, had been used for additional systemic BB support in setting of malabsorption  CV: Fluid overload resolved.  Hx of Afib and recent Aflutter alternating with sinus tachycardia (HR 100s): Changed to po Lopressor 50 q12. Cont Lovenox. S/p previous DCCV, off Amiodarone and Lipitor due to persistent transaminitis. BLE duplex (1/5) no DVT, TTE  (7/18) - PASP 64mmHg, EF 65-70%. Holding Losartan & norvasc, TTE (1/4) LVEF 75%, mild/mod AR, PASP 40, RVEF wnl. S/p PPM 2/5.   PULM: Atelectasis/dyspnea improved clinically: cont room air, dc'd Bipap. PRN duonebs for wheezing. Encourage OOB and IS.   GI/FEN: Low res, low fat, high protein diet, Ensure Max. 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 Continue TPN/lipids (now running over 12 hours), Cont Octreotide & Cholestyramine 10/18. Transaminitis slowly improved, elevated T bili, s/p Perc Deb on 9/11, uncapped on 1/15, recap on (1/17), removed on 2/8, Seen by Hepatology - MRCP 10/30 no obstruction, cont ursodiol, RUQ US 11/25 CBD 5mm, hepatic vessels patent. Cont Vit D weekly.   : Voids. ELVI improved: stopped famotidine given renal dysfunction. MARLO Duplex and RP US unremarkable, CK wnl.    ENDO: Hx hypothyroid: Cont Synthroid.   ID: currently not on any abx. Completed cefepime for PNA (1/15-23) BCx 11/22 grew candida glabrata, likely CLABSI. s/p Caspo & completed Fluconazole course (12/1-12/9). Ophthalmology evaluated - normal ocular exam. Echo no vegetation. PICC replaced on 12/4. Cont Nystatin powder. // DC: caspofungin (11/24-12/1). empiric 7days cefepime (11/15-11/22), C. tertium, Lactobacillus from IR cx 7/3, and candida albicans, lactobacillus, vanc sensitive E faecium, vanc resistant E gallinarum, vanc resistant E casseliflavus, lactobacillus from OR cx 7/5. Completed course of abx with Imipenem (9/10-9/15). Imipenem (8/26--) imipenem (6/30-7/12, 7/23-7/30), Dapto (6/30-7/5 and 7/23-7/24). Empiric Dapto (8/23-24) and Cefepime (8/23-24).   PPX: SCDs, currently on PPX lovenox given hx of thrombosis.   HEME: Anemia - continue Epogen weekly. Previously treated with Iron and intermittent transfusions.   LINES: PIVs,  new LUE placed (2/7--) will plan to switch PICC once a month in setting of fungemia history,  // DC: LUE PICC (9/1-11/1 ), RUE PICC: (11/1-11/24), LUE PICC (12/4-1/5) RUE PICC placed (1/5-2/7)   WOUNDS/DRAINS: Abdominal wound and fistula with wound manager in place dressing change twice weekly. Has recurrent punctate bleeding at wound bed with intermittent need for transfusion. Successfully managed with silver nitrate, surgicel, and epi soaked gauze. Cont care at bedside as needed for wound bleeding. RLQ Ostomy. IR perc deb tube recapped 1/17, IR tube study patent cystic duct, perc deb removed (2/7)  // DC: L Clay drain.  PT: Ambulate as tolerated OOB to chair daily. Repeat PT consult orderd on 2/2.   DISPO: SICU due to complicated hospital course. Cont Labs every other day. Patient will need a Semi-electric hospital bed and gel overlay and side rails for home use. Patients head needs to be elevated 30 degrees to prevent aspirations and wedges and pillows have been tried and failed. Patient is unable to make frequent changes to body position on their own. The member can independently affect the adjustment by operating the controls. Length of need is 36 months. will need rolling walker.

## 2024-02-10 NOTE — PROGRESS NOTE ADULT - NS ATTEND OPT1A GEN_ALL_CORE
History/Exam/Medical decision making

## 2024-02-10 NOTE — PROGRESS NOTE ADULT - NS ATTEND AMEND GEN_ALL_CORE FT
HTN, UC, s/p SBR, ileocolic resection, ileostomy, cholecystostomy  physical as above  transfuse one unit PRBC  continue TPN  gattex continues  continue metoprolol  LFTs overall improving

## 2024-02-10 NOTE — PROGRESS NOTE ADULT - NS ATTEND OPT1 GEN_ALL_CORE
I independently performed the documented:
I independently performed the documented:
I attest my time as attending is greater than 50% of the total combined time spent on qualifying patient care activities by the PA/NP and attending.
I attest my time as attending is greater than 50% of the total combined time spent on qualifying patient care activities by the PA/NP and attending.
I independently performed the documented:
I independently performed the documented:
I attest my time as attending is greater than 50% of the total combined time spent on qualifying patient care activities by the PA/NP and attending.
I independently performed the documented:
I attest my time as attending is greater than 50% of the total combined time spent on qualifying patient care activities by the PA/NP and attending.
I attest my time as attending is greater than 50% of the total combined time spent on qualifying patient care activities by the PA/NP and attending.
I independently performed the documented:
I attest my time as attending is greater than 50% of the total combined time spent on qualifying patient care activities by the PA/NP and attending.
I independently performed the documented:
I attest my time as attending is greater than 50% of the total combined time spent on qualifying patient care activities by the PA/NP and attending.
I independently performed the documented:
I attest my time as attending is greater than 50% of the total combined time spent on qualifying patient care activities by the PA/NP and attending.
I independently performed the documented:

## 2024-02-10 NOTE — PROGRESS NOTE ADULT - SUBJECTIVE AND OBJECTIVE BOX
INTERVAL/OVERNIGHT EVENTS: Slow bleed from ostomy site noted overnight. This am blood mixed with ostomy output was evaluated and continuous bleed was managed with silver nitrate. Stat cbc showed drop in hemoglobin to 7.5 from 8.2, given 1uPRBC    SUBJECTIVE: Pt seen at bedside this am. States he feels well but is fatigued. Denies headache, nausea, vomiting, chest pain, shortness of breath abdominal pain.      Neurologic Medications  gabapentin 100 milliGRAM(s) Oral at bedtime  LORazepam     Tablet 0.5 milliGRAM(s) Oral at bedtime  LORazepam     Tablet 0.25 milliGRAM(s) Oral every 12 hours PRN Anxiety  melatonin 5 milliGRAM(s) Oral at bedtime PRN Insomnia  venlafaxine XR. 225 milliGRAM(s) Oral daily    Cardiovascular Medications  metoprolol tartrate 50 milliGRAM(s) Oral every 12 hours    Gastrointestinal Medications  ergocalciferol 43756 Unit(s) Oral <User Schedule>  lipid, fat emulsion (Fish Oil and Plant Based) 20% Infusion 0.54 Gm/kG/Day IV Continuous <Continuous>  Parenteral Nutrition - Adult 1 Each TPN Continuous <Continuous>  Parenteral Nutrition - Adult 1 Each TPN Continuous <Continuous>  sodium chloride 0.9% lock flush 10 milliLiter(s) IV Push every 1 hour PRN Pre/post blood products, medications, blood draw, and to maintain line patency  sodium chloride 0.9% lock flush 10 milliLiter(s) IV Push every 1 hour PRN Pre/post blood products, medications, blood draw, and to maintain line patency  teduglutide Injectable 4.7 milliGRAM(s) SubCutaneous every 24 hours  ursodiol Suspension 300 milliGRAM(s) Oral every 8 hours    Hematologic/Oncologic Medications  enoxaparin Injectable 40 milliGRAM(s) SubCutaneous every 24 hours  epoetin matt-epbx (RETACRIT) Injectable 98612 Unit(s) SubCutaneous every 7 days    Endocrine/Metabolic Medications  cholestyramine Powder (Sugar-Free) 4 Gram(s) Oral daily  glucagon  Injectable 1 milliGRAM(s) IntraMuscular once  levothyroxine 50 MICROGram(s) Oral daily    Topical/Other Medications  chlorhexidine 0.12% Liquid 15 milliLiter(s) Swish and Spit two times a day PRN oral hygeine  chlorhexidine 2% Cloths 1 Application(s) Topical <User Schedule>  EPINEPHrine   1 mG/mL (1:1,000) Topical Solution 1 Application(s) Topical every 24 hours PRN for wound bleeding  silver nitrate Applicator 1 Application(s) Topical once PRN wound edge bleeding      MEDICATIONS  (PRN):  chlorhexidine 0.12% Liquid 15 milliLiter(s) Swish and Spit two times a day PRN oral hygeine  EPINEPHrine   1 mG/mL (1:1,000) Topical Solution 1 Application(s) Topical every 24 hours PRN for wound bleeding  LORazepam     Tablet 0.25 milliGRAM(s) Oral every 12 hours PRN Anxiety  melatonin 5 milliGRAM(s) Oral at bedtime PRN Insomnia  silver nitrate Applicator 1 Application(s) Topical once PRN wound edge bleeding  sodium chloride 0.9% lock flush 10 milliLiter(s) IV Push every 1 hour PRN Pre/post blood products, medications, blood draw, and to maintain line patency  sodium chloride 0.9% lock flush 10 milliLiter(s) IV Push every 1 hour PRN Pre/post blood products, medications, blood draw, and to maintain line patency      I&O's Detail    09 Feb 2024 07:01  -  10 Feb 2024 07:00  --------------------------------------------------------  IN:    Fat Emulsion (Fish Oil &amp; Plant Based) 20% Infusion: 291.2 mL    Oral Fluid: 480 mL    TPN (Total Parenteral Nutrition): 2112 mL  Total IN: 2883.2 mL    OUT:    Drain (mL): 1350 mL    Voided (mL): 1525 mL  Total OUT: 2875 mL    Total NET: 8.2 mL      10 Feb 2024 07:01  -  10 Feb 2024 13:38  --------------------------------------------------------  IN:  Total IN: 0 mL    OUT:    Drain (mL): 1200 mL    Voided (mL): 225 mL  Total OUT: 1425 mL    Total NET: -1425 mL          Vital Signs Last 24 Hrs  T(C): 36.6 (10 Feb 2024 13:00), Max: 36.8 (09 Feb 2024 18:26)  T(F): 97.8 (10 Feb 2024 13:00), Max: 98.2 (09 Feb 2024 18:26)  HR: 94 (10 Feb 2024 07:30) (91 - 94)  BP: 137/61 (10 Feb 2024 07:30) (127/60 - 144/67)  BP(mean): 88 (10 Feb 2024 07:30) (86 - 104)  RR: 21 (10 Feb 2024 07:30) (20 - 33)  SpO2: 95% (10 Feb 2024 07:30) (95% - 99%)    Parameters below as of 10 Feb 2024 07:30  Patient On (Oxygen Delivery Method): room air        GENERAL: NAD, resting comfortably in bed  HEENT: NCAT, MMM  C/V: Normal rate, regular rhythm without murmurs, rubs, or gallops  PULM: Nonlabored breathing, no respiratory distress, clear to auscultation bilaterally  ABD: Soft, ND, NT, no rebound tenderness, no guarding, wound manager with blood mixed with liquid and feculent output  EXTREM: WWP, no edema, SCDs in place  NEURO: No focal deficits    LABS:                        7.5    9.43  )-----------( 175      ( 10 Feb 2024 12:19 )             24.0     02-09    134<L>  |  102  |  36<H>  ----------------------------<  128<H>  4.1   |  26  |  1.16    Ca    7.7<L>      09 Feb 2024 04:50  Phos  3.5     02-09  Mg     1.9     02-09    TPro  7.4  /  Alb  1.8<L>  /  TBili  4.0<H>  /  DBili  2.7<H>  /  AST  109<H>  /  ALT  60<H>  /  AlkPhos  115  02-09      Urinalysis Basic - ( 09 Feb 2024 04:50 )    Color: x / Appearance: x / SG: x / pH: x  Gluc: 128 mg/dL / Ketone: x  / Bili: x / Urobili: x   Blood: x / Protein: x / Nitrite: x   Leuk Esterase: x / RBC: x / WBC x   Sq Epi: x / Non Sq Epi: x / Bacteria: x

## 2024-02-10 NOTE — PROGRESS NOTE ADULT - ASSESSMENT
78M w PMH Crohn's, AFib/Flutter s/p DCCVs on amiodarone, remote ileocectomy and open appendectomy. Admitted (6/23) for SBO vs Crohns flare, s/p NGT decompression and s/p lap converted to open TRE, SBR x 3, left in discontinuity with abthera vac on (6/27), RTOR for ileocolic resection, small bowel anastomosis, and abdominal wall closure on (6/28), c/b fluid collection s/p IR aspiration of perihepatic fluid on (7/3), c/b wound dehiscence s/p RTOR exlap, washout, ileocolic resection with end ileostomy, blow hole colostomy, red rubber from ileostomy to small bowel anastomosis; vicryl bridging mesh on (7/5) transferred to SICU postoperatively for hemodynamic monitoring, with hospital course complicated by periods of severe ELVI and hyponatremia, which resolved but stepped back up for to SICU on (9/10) for acute AMS, intermittent hypoglycemia, AFib with RVR. Percutaneous Cholecystostomy placed on (9/11) for enlarged GB, PCT capped 10/5 and uncapped 10/25 for hyperbilirubinemia, Right brachial DVT, left basillic and cephalic superficial thrombus on duplex 11/2, CT 11/14 with ALDEN ground glass opacity of unclear etiology, completed empiric 7day cefepime course 11/22, and another course of 7day cefepime for PNA  (1/15-23), hyperbilirubinemia of unclear etiology, resolved candida glabrata fungemia, ELVI resolved, second episode of sinus pause--pacemaker placed (2/5). cystic duct open, Perc cony removed 2/8    Continue excellent care per SICU

## 2024-02-11 NOTE — PROGRESS NOTE ADULT - SUBJECTIVE AND OBJECTIVE BOX
ON: 1L bolus for I/O negative 1.5L, DC summary updated to have Face to face.   2/10: Blood mixed with ostomy output, bleeder found, controlled with silver nitrate and hemostat. SBP 88, CBC 7.5 (8.2). Ordered 1u PRBC       SUBJECTIVE: Patient seen and examined bedside by general surgery team this AM. Patient was in good spirits this AM and is excited to be discharged tomorrow. Patient has no acute complaints at this time. Being cleaned and changed at time of interaction.     enoxaparin Injectable 40 milliGRAM(s) SubCutaneous every 24 hours  metoprolol tartrate 50 milliGRAM(s) Oral every 12 hours    MEDICATIONS  (PRN):  chlorhexidine 0.12% Liquid 15 milliLiter(s) Swish and Spit two times a day PRN oral hygeine  EPINEPHrine   1 mG/mL (1:1,000) Topical Solution 1 Application(s) Topical every 24 hours PRN for wound bleeding  LORazepam     Tablet 0.25 milliGRAM(s) Oral every 12 hours PRN Anxiety  melatonin 5 milliGRAM(s) Oral at bedtime PRN Insomnia  silver nitrate Applicator 1 Application(s) Topical once PRN wound edge bleeding  sodium chloride 0.9% lock flush 10 milliLiter(s) IV Push every 1 hour PRN Pre/post blood products, medications, blood draw, and to maintain line patency  sodium chloride 0.9% lock flush 10 milliLiter(s) IV Push every 1 hour PRN Pre/post blood products, medications, blood draw, and to maintain line patency      I&O's Detail    10 Feb 2024 07:01  -  11 Feb 2024 07:00  --------------------------------------------------------  IN:    Fat Emulsion (Fish Oil &amp; Plant Based) 20% Infusion: 270.4 mL    Lactated Ringers Bolus: 1000 mL    PRBCs (Packed Red Blood Cells): 300 mL    TPN (Total Parenteral Nutrition): 1808 mL  Total IN: 3378.4 mL    OUT:    Drain (mL): 1975 mL    Voided (mL): 1280 mL  Total OUT: 3255 mL    Total NET: 123.4 mL          Vital Signs Last 24 Hrs  T(C): 36.4 (11 Feb 2024 05:53), Max: 36.6 (10 Feb 2024 13:00)  T(F): 97.6 (11 Feb 2024 05:53), Max: 97.8 (10 Feb 2024 13:00)  HR: 96 (11 Feb 2024 05:55) (92 - 96)  BP: 111/53 (11 Feb 2024 05:55) (91/54 - 122/56)  BP(mean): 77 (11 Feb 2024 05:55) (69 - 81)  RR: 22 (11 Feb 2024 05:55) (20 - 22)  SpO2: 98% (11 Feb 2024 05:55) (96% - 98%)    Parameters below as of 11 Feb 2024 05:55  Patient On (Oxygen Delivery Method): room air        Physical Exam:  General: NAD, resting comfortably in bed  C/V: NSR  Pulm: Nonlabored breathing, no respiratory distress  Abd: soft, NT/ND, EAF with wound manager in place, ileostomy with no signs of bleeding  Extrem: WWP, no edema, SCDs in place    LABS:                        8.3    9.90  )-----------( 156      ( 11 Feb 2024 04:34 )             25.9     02-11    139  |  105  |  39<H>  ----------------------------<  125<H>  4.2   |  26  |  1.25    Ca    7.9<L>      11 Feb 2024 04:34  Phos  3.6     02-11  Mg     2.2     02-11    TPro  7.2  /  Alb  1.8<L>  /  TBili  3.9<H>  /  DBili  2.5<H>  /  AST  111<H>  /  ALT  63<H>  /  AlkPhos  109  02-11      Urinalysis Basic - ( 11 Feb 2024 04:34 )    Color: x / Appearance: x / SG: x / pH: x  Gluc: 125 mg/dL / Ketone: x  / Bili: x / Urobili: x   Blood: x / Protein: x / Nitrite: x   Leuk Esterase: x / RBC: x / WBC x   Sq Epi: x / Non Sq Epi: x / Bacteria: x        RADIOLOGY & ADDITIONAL STUDIES:

## 2024-02-11 NOTE — PROGRESS NOTE ADULT - ATTENDING COMMENTS
UC, AF, s/p SBR, ileocolic resection, ileostomy, enteroatmospheric fistula, anemia  physical as above  given one unit PRBC yesterday  continue TPN  gattex  metoprolol  bilirubin trend downward

## 2024-02-11 NOTE — PROGRESS NOTE ADULT - SUBJECTIVE AND OBJECTIVE BOX
INTERVAL/OVERNIGHT EVENTS: 2/10: Blood mixed with ostomy output, bleeder found, controlled with silver nitrate and hemostat. SBP 88, CBC 7.5 (8.2). Ordered 1u PRBC, ON: 1L bolus for I/O negative 1.5L, Hb 8.3.     SUBJECTIVE: Patient seen and examined bedside. Pt reports feeling well this morning with no acute complaints. Reports looking forward to discharge. Denies chest pain, SOB, or palpitations.     enoxaparin Injectable 40 milliGRAM(s) SubCutaneous every 24 hours  metoprolol tartrate 50 milliGRAM(s) Oral every 12 hours      Vital Signs Last 24 Hrs  T(C): 36.4 (11 Feb 2024 05:53), Max: 36.6 (10 Feb 2024 13:00)  T(F): 97.6 (11 Feb 2024 05:53), Max: 97.8 (10 Feb 2024 13:00)  HR: 96 (11 Feb 2024 05:55) (92 - 96)  BP: 111/53 (11 Feb 2024 05:55) (91/54 - 122/56)  BP(mean): 77 (11 Feb 2024 05:55) (69 - 81)  RR: 22 (11 Feb 2024 05:55) (20 - 22)  SpO2: 98% (11 Feb 2024 05:55) (96% - 98%)    Parameters below as of 11 Feb 2024 05:55  Patient On (Oxygen Delivery Method): room air      I&O's Detail    10 Feb 2024 07:01  -  11 Feb 2024 07:00  --------------------------------------------------------  IN:    Fat Emulsion (Fish Oil &amp; Plant Based) 20% Infusion: 270.4 mL    Lactated Ringers Bolus: 1000 mL    PRBCs (Packed Red Blood Cells): 300 mL    TPN (Total Parenteral Nutrition): 1808 mL  Total IN: 3378.4 mL    OUT:    Drain (mL): 1975 mL    Voided (mL): 1280 mL  Total OUT: 3255 mL    Total NET: 123.4 mL          Physical Exam:  GENERAL: NAD, resting comfortably in bed  HEENT: NCAT, MMM  C/V: Normal rate, regular rhythm without murmurs, rubs, or gallops  PULM: Nonlabored breathing, no respiratory distress, clear to auscultation   Abd: soft, NT/ND, EAF with wound manager in place, ileostomy with no signs of bleeding  Extrem: WWP, no edema, SCDs in place      LABS:                        8.3    9.90  )-----------( 156      ( 11 Feb 2024 04:34 )             25.9     02-11    139  |  105  |  39<H>  ----------------------------<  125<H>  4.2   |  26  |  1.25    Ca    7.9<L>      11 Feb 2024 04:34  Phos  3.6     02-11  Mg     2.2     02-11    TPro  7.2  /  Alb  1.8<L>  /  TBili  3.9<H>  /  DBili  2.5<H>  /  AST  111<H>  /  ALT  63<H>  /  AlkPhos  109  02-11      Urinalysis Basic - ( 11 Feb 2024 04:34 )    Color: x / Appearance: x / SG: x / pH: x  Gluc: 125 mg/dL / Ketone: x  / Bili: x / Urobili: x   Blood: x / Protein: x / Nitrite: x   Leuk Esterase: x / RBC: x / WBC x   Sq Epi: x / Non Sq Epi: x / Bacteria: x        RADIOLOGY & ADDITIONAL STUDIES:

## 2024-02-11 NOTE — PROGRESS NOTE ADULT - ATTENDING SUPERVISION STATEMENT
Resident
Fellow
Resident
Student
Fellow
Fellow
Resident
Student
Fellow
Resident
Fellow
Resident
Fellow
Resident
Student
Resident
Fellow
Resident
Resident
Fellow/Student
Resident
Fellow/Student
Resident
Resident

## 2024-02-12 NOTE — ADVANCED PRACTICE NURSE CONSULT - APN SPECIALTY LIST
Wound Ostomy Care

## 2024-02-12 NOTE — ADVANCED PRACTICE NURSE CONSULT - REASON FOR CONSULT
WOC nurse consult to change leaking pouching system. 
WOC nurse follow up to change fistula pouch and right side of abdomen pouch. 
WOC nurse follow up to change fistula pouching system. 
appliance change
fistula 
fistula care
fistula management
fistula management
[facility] nurse consult to assist Dr Knapp and Dr Perez with VAC dressing change. 
WOC nurse follow up to change fistula pouching system. 
WOC nurse follow up to change leaking ileostomy pouching system. 
WOC nurse follow up to change pouching system over ileostomy and stomatized fistula. 
discharge education 
fistula 
wound manager replacement
wound/fistula 
Late entry for 2/5/24    appliance change
WOC nurse follow up to apply wound manager over abdominal wound and fistula. 
fistula 
fistula/wound 
ostomy assessment
ostomy assessment/education
ostomy education/assessment
pressure injury
wound manager
wound/fistula 
wound/fistula 
WOC nurse follow up to change ileostomy and fistula pouching system. 
application of wound manager
ostomy education
ostomy education
wound 
wound/fistula 
wound/fistula 
wound/fistula manager
WOC nurse follow up to change fistula pouching system. 
WOC nurse follow up to change fistula pouching system. 
dressing change to fistula site
fistula dressing change
fistula management
leaking of wound manager
new appliance over fistula site
WOC nurse consult to change leaking pouch over fistula. 
wound

## 2024-02-12 NOTE — ADVANCED PRACTICE NURSE CONSULT - ASSESSMENT
New Coloplast wound/fistula manager applied over site to mid abdomen. Periwound protected with Cavilon barrier wipes and stoma rings prior to placing wound manager. Slight bleeding noted in pouch but stopped easily. 3 bags packed with all supplies needed for fistula care for the first week, supply list left in one of the bags and also placed in notebook that contains pertinent instructions/info. List of Rx needed was given to resident team.

## 2024-02-12 NOTE — DISCHARGE NOTE NURSING/CASE MANAGEMENT/SOCIAL WORK - NSDCPEFALRISK_GEN_ALL_CORE
For information on Fall & Injury Prevention, visit: [email or web address] OR  [email or web address] OR  https://www.cdc.gov/steadi/patient.html

## 2024-02-12 NOTE — DISCHARGE NOTE NURSING/CASE MANAGEMENT/SOCIAL WORK - NSDCVIVACCINE_GEN_ALL_CORE_FT
influenza, high-dose, quadrivalent; 23-Jan-2024 11:29; Deandra Mackay (RN); [employer]; [id number] (Exp. Date: 30-Jun-2024); IntraMuscular; Deltoid Left.; 0.7 milliLiter(s); VIS (VIS Published: 06-Aug-2021, VIS Presented: 23-Jan-2024);

## 2024-02-12 NOTE — DISCHARGE NOTE NURSING/CASE MANAGEMENT/SOCIAL WORK - PATIENT PORTAL LINK FT
You can access the FollowMyHealth Patient Portal offered by [facility] by registering at the following website: [email or web address]. By joining [employer]’s FollowMyHealth portal, you will also be able to view your health information using other applications (apps) compatible with our system.

## 2024-02-16 PROBLEM — R00.1 BRADYCARDIA, UNSPECIFIED: Chronic | Status: ACTIVE | Noted: 2024-01-01

## 2024-02-16 PROBLEM — I49.8 OTHER SPECIFIED CARDIAC ARRHYTHMIAS: Chronic | Status: ACTIVE | Noted: 2023-01-01

## 2024-02-16 NOTE — ED ADULT NURSE REASSESSMENT NOTE - NS ED NURSE REASSESS COMMENT FT1
Pt received aox4 w/ nurse at bedside. 2nd blood transfusion in progress. No complaints at this time.

## 2024-02-16 NOTE — ADVANCED PRACTICE NURSE CONSULT - ASSESSMENT
Pt seen in ED; presents for blood transfusion d/t Hgb 6.5. Well known to me from previous extended admission. Private hire RN at bedside.     Abdomen: Unchanged from previous assessments. Stable fistula with thin brown effluent mixed to blood. One area at 8 o'clock with bleeding, stopped with Surgicel application.   Ileostomy: small amt of bloody output in pouch. Stoma not visible, pinpoint opening. This is unchanged from previous assessments

## 2024-02-16 NOTE — ED ADULT NURSE NOTE - NSICDXPASTMEDICALHX_GEN_ALL_CORE_FT
PAST MEDICAL HISTORY:  Atrial fibrillation s/p cardioversion 2010 and 2014  Pt. reports 4 DCCV  Now on Amiodarone 200mg PO bid and Eliquis 5mg PO bid  Last DCCV 4 yrs ago at [facility]    Bradycardia     Crohn's disease s/p partial resection of ileum    Essential hypertension Hypertension    History of depression On Venlafaxine ER 150mg PO bid    Hyperlipidemia     Hypothyroidism     Junctional rhythm

## 2024-02-16 NOTE — ED PROVIDER NOTE - PATIENT PORTAL LINK FT
You can access the FollowMyHealth Patient Portal offered by [facility] by registering at the following website: [email or web address]. By joining [employer]’s FollowMyHealth portal, you will also be able to view your health information using other applications (apps) compatible with our system.

## 2024-02-16 NOTE — ED PROVIDER NOTE - OBJECTIVE STATEMENT
78M w/ Crohn's, AFib/Flutter s/p DCCVs, remote ileocectomy and open appy, presented to [facility] with abdominal pain, outpatient CT concerning for dilated loops of small bowel and stomach, admitted for resolving SBO vs Crohn's flare, placed a nasogastric tube for decompression, plan for nonoperative management with serial abdominal exams and close hemodynamic monitoring. Course significant for uncontrolled hypertension, medicine team followed throughout their hospital stay, antihypertensives given accordingly. Given no progression of bowel function, decision was made to take patient to the OR on 6/26 for laparoscopic lysis of adhesions, converted to open lysis of adhesions, SBR x 3 and left in discontinuity, temporary abdominal closure. Transferred to SICU post op for hemodynamic monitoring, remained intubated. Course complicated by right sided pneumothorax, thoracic surgery was consulted, placed chest tube on 6/27. Patient returned to the OR on 6/28 for ex lap, removal of abthera, ileocolic resection, small bowel anastomosis, abdominal wall closure. Post operative hemoglobin downtrended to 8.5 from 9, given 1U PRBC. Started on TPN for nutritional optimization and successfully extubated to high flow NC on 6/29. Respiratory status continued to be monitored closely with serial CXRs, chest PT. Course complicated by fevers, altered mental status, fever workup obtained, CXR showing left lung consolidation/effusion, urinalysis negative, blood cultures negative. OR cultures growing enterococcus, infectious disease team consulted, antibiotics adjusted. PICC line was placed on 6/30, no complications. Patient was diuresed as necessary with lasix for fluid overload. Nasogastric tube was removed on 7/1. Pt had extremely continued complicated hospital course requiring multiple visits to OR, treatement for fungemia, TPN, multiple transfusions.  Pt discharged on 2/14/24 and returns today for low hgb on outpt labs most likely from oozing from surgical ostomy and fistula stoma.  Pt complains of generalized weakness.  No fever or chills.

## 2024-02-16 NOTE — ED PROVIDER NOTE - PROGRESS NOTE DETAILS
Lakhwinder: received s/o from Dr Davison. Dr Trejo is here for blood transfusion and d/c. Multiple transfusions w/o reaction. Pt of Dr Harrington. Prolonged hospital course for complications of SBO, w/ continued oozing from wound as source of bleeding. For d/c s/p pRBCs Pt feels well. Blood transfusion finishing. Pending txp for dx. Pt in c/o 24-hour RN

## 2024-02-16 NOTE — ADVANCED PRACTICE NURSE CONSULT - RECOMMEDATIONS
Fistula: New Dustin's wound/fistula pouch applied over fistula site. Periwound protected with Cavilon barrier wipe then stoma rings and paste applied to the periwound area, stoma powder over denuded area prior to application of Dustin's pouch.   Ileostomy: Jayleen pedi pouch applied    Continue with HHRN and private hire RNs at home  Extra supplies at bedside.

## 2024-02-16 NOTE — ED PROVIDER NOTE - NSICDXPASTMEDICALHX_GEN_ALL_CORE_FT
PAST MEDICAL HISTORY:  Atrial fibrillation s/p cardioversion 2010 and 2014  Pt. reports 4 DCCV  Now on Amiodarone 200mg PO bid and Eliquis 5mg PO bid  Last DCCV 4 yrs ago at [facility]    Bradycardia     Crohn's disease s/p partial resection of ileum    Essential hypertension Hypertension    History of depression On Venlafaxine ER 150mg PO bid    Hyperlipidemia     Hypothyroidism     Junctional rhythm

## 2024-02-16 NOTE — ED PROVIDER NOTE - NSFOLLOWUPINSTRUCTIONS_ED_ALL_ED_FT
Anemia    WHAT YOU NEED TO KNOW:    What is anemia? Anemia is a low number of red blood cells or a low amount of hemoglobin in your red blood cells. Hemoglobin is a protein that helps carry oxygen throughout your body. Red blood cells use iron to create hemoglobin. Anemia may develop if your body does not have enough iron. It may also develop if your body does not make enough red blood cells or they die faster than your body can make them.    What increases my risk for anemia?    Trauma or surgery that causes massive blood loss    A gastrointestinal bleed    A woman's monthly period    A family history of blood disease or anemia    Liver or kidney disease, cancer, rheumatoid arthritis, or hyperthyroidism    Alcohol abuse    Lack of foods that contain iron, folic acid, or vitamin B12  What are the signs and symptoms of anemia?    Chest pain or a fast heartbeat    Lightheadedness, dizziness, or shortness of breath    Cold or pale skin    Tiredness, weakness, or confusion  How is anemia diagnosed? Blood tests will show if you have anemia.    How is anemia treated? Treatment depends on the type of anemia you have. You may need any of the following:    Iron or folic acid supplements help increase your red blood cell and hemoglobin levels.    Vitamin B12 injections may help boost your red blood cell count and decrease your symptoms.    A blood transfusion may be needed if your body cannot replace the blood you have lost.    Surgery may be needed to stop bleeding, or if your anemia is severe.  How can I prevent anemia? Eat healthy foods rich in iron and vitamin C. Nuts, meat, dark leafy green vegetables, and beans are high in iron and protein. Vitamin C helps your body absorb iron. Foods rich in vitamin C include oranges and other citrus fruits. Ask your healthcare provider for a list of other foods that are high in iron or vitamin C. Ask if you need to be on a special diet.  Sources of Iron  Sources of Vitamin C    Call your local emergency number (911 in the [place]), or have someone call if:    You lose consciousness.    You have severe chest pain.  When should I seek immediate care?    You have dark or bloody bowel movements.    When should I call my doctor?    Your symptoms are worse, even after treatment.    You have questions or concerns about your condition or care.  CARE AGREEMENT:    You have the right to help plan your care. Learn about your health condition and how it may be treated. Discuss treatment options with your healthcare providers to decide what care you want to receive. You always have the right to refuse treatment.    © [employer] 1973, 2024    	  back to top            © [employer] 1973, 2024 You received 2 units of packed red blood cells. Please follow up with Dr Aguiar    Anemia    WHAT YOU NEED TO KNOW:    What is anemia? Anemia is a low number of red blood cells or a low amount of hemoglobin in your red blood cells. Hemoglobin is a protein that helps carry oxygen throughout your body. Red blood cells use iron to create hemoglobin. Anemia may develop if your body does not have enough iron. It may also develop if your body does not make enough red blood cells or they die faster than your body can make them.    What increases my risk for anemia?    Trauma or surgery that causes massive blood loss    A gastrointestinal bleed    A woman's monthly period    A family history of blood disease or anemia    Liver or kidney disease, cancer, rheumatoid arthritis, or hyperthyroidism    Alcohol abuse    Lack of foods that contain iron, folic acid, or vitamin B12  What are the signs and symptoms of anemia?    Chest pain or a fast heartbeat    Lightheadedness, dizziness, or shortness of breath    Cold or pale skin    Tiredness, weakness, or confusion  How is anemia diagnosed? Blood tests will show if you have anemia.    How is anemia treated? Treatment depends on the type of anemia you have. You may need any of the following:    Iron or folic acid supplements help increase your red blood cell and hemoglobin levels.    Vitamin B12 injections may help boost your red blood cell count and decrease your symptoms.    A blood transfusion may be needed if your body cannot replace the blood you have lost.    Surgery may be needed to stop bleeding, or if your anemia is severe.  How can I prevent anemia? Eat healthy foods rich in iron and vitamin C. Nuts, meat, dark leafy green vegetables, and beans are high in iron and protein. Vitamin C helps your body absorb iron. Foods rich in vitamin C include oranges and other citrus fruits. Ask your healthcare provider for a list of other foods that are high in iron or vitamin C. Ask if you need to be on a special diet.  Sources of Iron  Sources of Vitamin C    Call your local emergency number (911 in the [place]), or have someone call if:    You lose consciousness.    You have severe chest pain.  When should I seek immediate care?    You have dark or bloody bowel movements.    When should I call my doctor?    Your symptoms are worse, even after treatment.    You have questions or concerns about your condition or care.  CARE AGREEMENT:    You have the right to help plan your care. Learn about your health condition and how it may be treated. Discuss treatment options with your healthcare providers to decide what care you want to receive. You always have the right to refuse treatment.    © [employer] 1973, 2024    	  back to top            © [employer] 1973, 2024

## 2024-02-16 NOTE — ED PROVIDER NOTE - CLINICAL SUMMARY MEDICAL DECISION MAKING FREE TEXT BOX
78M w/ Crohn's, AFib/Flutter s/p DCCVs, remote ileocectomy and open appy, presented to [facility] with abdominal pain, outpatient CT concerning for dilated loops of small bowel and stomach, admitted for resolving SBO vs Crohn's flare, placed a nasogastric tube for decompression, plan for nonoperative management with serial abdominal exams and close hemodynamic monitoring. Course significant for uncontrolled hypertension, medicine team followed throughout their hospital stay, antihypertensives given accordingly. Given no progression of bowel function, decision was made to take patient to the OR on 6/26 for laparoscopic lysis of adhesions, converted to open lysis of adhesions, SBR x 3 and left in discontinuity, temporary abdominal closure. Transferred to SICU post op for hemodynamic monitoring, remained intubated. Course complicated by right sided pneumothorax, thoracic surgery was consulted, placed chest tube on 6/27. Patient returned to the OR on 6/28 for ex lap, removal of abthera, ileocolic resection, small bowel anastomosis, abdominal wall closure. Post operative hemoglobin downtrended to 8.5 from 9, given 1U PRBC. Started on TPN for nutritional optimization and successfully extubated to high flow NC on 6/29. Respiratory status continued to be monitored closely with serial CXRs, chest PT. Course complicated by fevers, altered mental status, fever workup obtained, CXR showing left lung consolidation/effusion, urinalysis negative, blood cultures negative. OR cultures growing enterococcus, infectious disease team consulted, antibiotics adjusted. PICC line was placed on 6/30, no complications. Patient was diuresed as necessary with lasix for fluid overload. Nasogastric tube was removed on 7/1. Pt had extremely continued complicated hospital course requiring multiple visits to OR, treatement for fungemia, TPN, multiple transfusions.  Pt discharged on 2/14/24 and returns today for low hgb on outpt labs most likely from oozing from surgical ostomy and fistula stoma.  Pt complains of generalized weakness.  No fever or chills.    VS - stable  Exam - ostomy and fistula ostomy with some mild oozing - wound care nurse at  bedside changing dressing and applying surgicel  Hgb 6.7  Consent obtained  PRBC x 2 units ordered  As per Dr. Stefania gonzalez to transfuse 2 units and dc
7

## 2024-02-16 NOTE — ED ADULT TRIAGE NOTE - CHIEF COMPLAINT QUOTE
Pt sent to ED for abnormal hemoglobin, 6.2 this morning. hx of bowel obstruction, confirmed abdominal fistula that is bleeding. +generalized weakness, lightheadedness.

## 2024-02-16 NOTE — ED PROVIDER NOTE - PHYSICAL EXAMINATION
VITAL SIGNS: I have reviewed nursing notes and confirm.  CONSTITUTIONAL: weak appearing  SKIN: pale  HEAD: Normocephalic; atraumatic.  EYES: PERRL, EOM intact; conjunctiva and sclera clear.  ENT: No nasal discharge; airway clear.  NECK: Supple; non tender.  CARD: S1, S2 normal; no murmurs, gallops, or rubs. Regular rate and rhythm.  RESP: No wheezes, rales or rhonchi.  ABD: + ostomy and fistula stoma with bleeding/oozing  EXT: Normal ROM. No clubbing, cyanosis or edema.  LYMPH: No acute cervical adenopathy.  NEURO: Alert, oriented. Grossly unremarkable.  PSYCH: Cooperative, appropriate.

## 2024-02-16 NOTE — ED ADULT NURSE NOTE - NURSING ED SKIN COLOR
jaundiced Klisyri Pregnancy And Lactation Text: It is unknown if this medication can harm a developing fetus or if it is excreted in breast milk.

## 2024-02-16 NOTE — ED ADULT NURSE NOTE - OBJECTIVE STATEMENT
Patient aaox4 c/o generalized weakness with hgb 6.2 (labs drawn this morning) and leaking abdominal fistula. Presents with left-sided PICC, last used last night for TPN. Maintaining patent airway, denies sob/cp/dizziness/n/v/fever/chills. In patient gown.

## 2024-02-16 NOTE — CONSULT NOTE ADULT - SUBJECTIVE AND OBJECTIVE BOX
78M w PMH Crohn's, AFib/Flutter s/p DCCVs on amiodarone, remote ileocectomy and open appendectomy. Admitted (6/23) for SBO vs Crohns flare, s/p NGT decompression and s/p lap converted to open TRE, SBR x 3, left in discontinuity with abthera vac on (6/27), RTOR for ileocolic resection, small bowel anastomosis, and abdominal wall closure on (6/28), c/b fluid collection s/p IR aspiration of perihepatic fluid on (7/3), c/b wound dehiscence s/p RTOR exlap, washout, ileocolic resection with end ileostomy, blow hole colostomy, red rubber from ileostomy to small bowel anastomosis; vicryl bridging mesh on (7/5) transferred to SICU postoperatively for hemodynamic monitoring, with hospital course complicated by periods of severe ELVI and hyponatremia, which resolved but stepped back up for to SICU on (9/10) for acute AMS, intermittent hypoglycemia, AFib with RVR. Percutaneous Cholecystostomy placed on (9/11) for enlarged GB, PCT capped 10/5 and uncapped 10/25 for hyperbilirubinemia, Right brachial DVT, left basillic and cephalic superficial thrombus on duplex 11/2, CT 11/14 with ALDEN ground glass opacity of unclear etiology, completed empiric 7day cefepime course 11/22, and another course of 7day cefepime for PNA  (1/15-23), hyperbilirubinemia of unclear etiology, resolved candida glabrata fungemia, ELVI resolved, second episode of sinus pause--pacemaker placed (2/5). cystic duct open, Perc cony removed 2/8. Discharged home 2/12/24. Patient now presenting to ED for blood transfusion after noted to have a Hgb of 6.5 on routine lab work at home.     Patient reports that his     In the ED, pt afebrile, nontachycardic, normotensive, and satting on RA. On exam, _____. Labs significant for ______. CTAP showing _____.         T(C): 36.6 (02-16-24 @ 12:47), Max: 36.6 (02-16-24 @ 12:47)  HR: 96 (02-16-24 @ 12:47) (96 - 100)  BP: 119/72 (02-16-24 @ 12:47) (119/72 - 123/97)  RR: 18 (02-16-24 @ 12:47) (18 - 18)  SpO2: 97% (02-16-24 @ 12:47) (97% - 100%)    Physical Exam  General: AAOx3, NAD, laying comfortably in bed  Cardio: S1,S2, No MRG  Pulm: Nonlabored breathing  Abdomen:  Extremities: WWP, peripheral pulses appreciated      LABS:                        6.7    12.44 )-----------( 186      ( 16 Feb 2024 11:25 )             20.8     02-16    138  |  104  |  40<H>  ----------------------------<  108<H>  4.2   |  27  |  1.34<H>    Ca    8.2<L>      16 Feb 2024 11:25      PT/INR - ( 16 Feb 2024 11:25 )   PT: 14.3 sec;   INR: 1.26          PTT - ( 16 Feb 2024 11:25 )  PTT:31.2 sec      No new imaging. 78M w PMH Crohn's, AFib/Flutter s/p DCCVs on amiodarone, remote ileocectomy and open appendectomy. Admitted (6/23) for SBO vs Crohns flare, s/p NGT decompression and s/p lap converted to open TRE, SBR x 3, left in discontinuity with abthera vac on (6/27), RTOR for ileocolic resection, small bowel anastomosis, and abdominal wall closure on (6/28), c/b fluid collection s/p IR aspiration of perihepatic fluid on (7/3), c/b wound dehiscence s/p RTOR exlap, washout, ileocolic resection with end ileostomy, blow hole colostomy, red rubber from ileostomy to small bowel anastomosis; vicryl bridging mesh on (7/5) transferred to SICU postoperatively for hemodynamic monitoring, with hospital course complicated by periods of severe ELVI and hyponatremia, which resolved but stepped back up for to SICU on (9/10) for acute AMS, intermittent hypoglycemia, AFib with RVR. Percutaneous Cholecystostomy placed on (9/11) for enlarged GB, PCT capped 10/5 and uncapped 10/25 for hyperbilirubinemia, Right brachial DVT, left basillic and cephalic superficial thrombus on duplex 11/2, CT 11/14 with ALDEN ground glass opacity of unclear etiology, completed empiric 7day cefepime course 11/22, and another course of 7day cefepime for PNA  (1/15-23), hyperbilirubinemia of unclear etiology, resolved candida glabrata fungemia, ELVI resolved, second episode of sinus pause--pacemaker placed (2/5). cystic duct open, Perc cony removed 2/8. Discharged home 2/12/24. Patient now presenting to ED for blood transfusion after noted to have a Hgb of 6.5 on routine lab work at home.     Patient reports that his midline abdominal wound has been oozing blood since yesterday. The location is just lateral to his ECF overlying the granulating tissue. He reports experiencing fatigue as well over the last few days as well. Patient underwent routine blood work at home and was found to have a Hgb 6.5 and was told by his primary care physician to visit the ED for blood transfusion.    In the ED, VS wnl. On exam, patient remains jaundiced, abdomen is soft, NTND. The ECF pouch was replaced by the ostomy nursing team and surgicell placed overlying bleeding area. No visible vessels, no sign of active bleeding at this time, no blood in appliance. Labs significant for Hgb 6.5. Patient was seen by ostomy nursing team, hemostasis was achieved and appliance exchanged. 2 units pRBCs were ordered.       T(C): 36.6 (02-16-24 @ 12:47), Max: 36.6 (02-16-24 @ 12:47)  HR: 96 (02-16-24 @ 12:47) (96 - 100)  BP: 119/72 (02-16-24 @ 12:47) (119/72 - 123/97)  RR: 18 (02-16-24 @ 12:47) (18 - 18)  SpO2: 97% (02-16-24 @ 12:47) (97% - 100%)    Physical Exam  General: AAOx3, NAD, laying comfortably in bed  Cardio: S1,S2, No MRG  Pulm: Nonlabored breathing  Abdomen: soft, NTND. The ECF pouch was replaced by the ostomy nursing team and surgicell placed overlying bleeding area. No visible vessels, no sign of active bleeding at this time, no blood in appliance.  Extremities: WWP, peripheral pulses appreciated  Skin: Jaundiced      LABS:                        6.7    12.44 )-----------( 186      ( 16 Feb 2024 11:25 )             20.8     02-16    138  |  104  |  40<H>  ----------------------------<  108<H>  4.2   |  27  |  1.34<H>    Ca    8.2<L>      16 Feb 2024 11:25      PT/INR - ( 16 Feb 2024 11:25 )   PT: 14.3 sec;   INR: 1.26          PTT - ( 16 Feb 2024 11:25 )  PTT:31.2 sec      No new imaging.

## 2024-02-16 NOTE — CONSULT NOTE ADULT - ASSESSMENT
78M w PMH Crohn's, AFib/Flutter s/p DCCVs on amiodarone, remote ileocectomy and open appendectomy. Admitted (6/23) for SBO vs Crohns flare, s/p NGT decompression and s/p lap converted to open TRE, SBR x 3, left in discontinuity with abthera vac on (6/27), RTOR for ileocolic resection, small bowel anastomosis, and abdominal wall closure on (6/28), c/b fluid collection s/p IR aspiration of perihepatic fluid on (7/3), c/b wound dehiscence s/p RTOR exlap, washout, ileocolic resection with end ileostomy, blow hole colostomy, red rubber from ileostomy to small bowel anastomosis; vicryl bridging mesh on (7/5) transferred to SICU postoperatively for hemodynamic monitoring, with hospital course complicated by periods of severe ELVI and hyponatremia, which resolved but stepped back up for to SICU on (9/10) for acute AMS, intermittent hypoglycemia, AFib with RVR. Percutaneous Cholecystostomy placed on (9/11) for enlarged GB, PCT capped 10/5 and uncapped 10/25 for hyperbilirubinemia, Right brachial DVT, left basillic and cephalic superficial thrombus on duplex 11/2, CT 11/14 with ALDEN ground glass opacity of unclear etiology, completed empiric 7day cefepime course 11/22, and another course of 7day cefepime for PNA  (1/15-23), hyperbilirubinemia of unclear etiology, resolved candida glabrata fungemia, ELVI resolved, second episode of sinus pause--pacemaker placed (2/5). cystic duct open, Perc cony removed 2/8. Discharged home 2/12/24. Patient now presenting to ED for blood transfusion after noted to have a Hgb of 6.5 on routine lab work at home.       Plan:  - Transfuse 2 unit pRBCs  - D/c home afterwards   - F/u with Dr. Peterson, Dr. Aguiar, Dr. Knapp in the outpatient setting  Plan discussed with chief resident and attending, Dr. Peterson 78M w PMH Crohn's, AFib/Flutter s/p DCCVs on amiodarone, remote ileocectomy and open appendectomy. Admitted (6/23) for SBO vs Crohns flare, s/p NGT decompression and s/p lap converted to open TRE, SBR x 3, left in discontinuity with abthera vac on (6/27), RTOR for ileocolic resection, small bowel anastomosis, and abdominal wall closure on (6/28), c/b fluid collection s/p IR aspiration of perihepatic fluid on (7/3), c/b wound dehiscence s/p RTOR exlap, washout, ileocolic resection with end ileostomy, blow hole colostomy, red rubber from ileostomy to small bowel anastomosis; vicryl bridging mesh on (7/5) transferred to SICU postoperatively for hemodynamic monitoring, with hospital course complicated by periods of severe ELVI and hyponatremia, which resolved but stepped back up for to SICU on (9/10) for acute AMS, intermittent hypoglycemia, AFib with RVR. Percutaneous Cholecystostomy placed on (9/11) for enlarged GB, PCT capped 10/5 and uncapped 10/25 for hyperbilirubinemia, Right brachial DVT, left basillic and cephalic superficial thrombus on duplex 11/2, CT 11/14 with ALDEN ground glass opacity of unclear etiology, completed empiric 7day cefepime course 11/22, and another course of 7day cefepime for PNA  (1/15-23), hyperbilirubinemia of unclear etiology, resolved candida glabrata fungemia, ELVI resolved, second episode of sinus pause--pacemaker placed (2/5). cystic duct open, Perc cony removed 2/8. Discharged home 2/12/24. Patient now presenting to ED for blood transfusion after noted to have a Hgb of 6.5 on routine lab work at home. Patient appears well, reports just experiencing fatigue. Ostomy nurses achieved hemostasis with surgicell and replaced ECF appliance, no active bleeding noted or visible vessels at this time.     Plan:  - Transfuse 2 unit pRBCs  - D/c home afterwards if tolerates well  - F/u closely with Dr. Peterson, Dr. Aguiar, Dr. Knapp in the outpatient setting  Plan discussed with chief resident and attending, Dr. Peterson

## 2024-02-16 NOTE — ED ADULT NURSE NOTE - NSFALLHARMRISKINTERV_ED_ALL_ED

## 2024-02-23 NOTE — ED ADULT NURSE NOTE - OBJECTIVE STATEMENT
77 yo M c/o low hemoglobin, presents to ED for PRBC transfusion. 77 yo M c/o low hemoglobin, presents to ED for PRBC transfusion. Pt has stoma and blood noticed in bag. Alert and oriented, ambulatory with walker.

## 2024-02-23 NOTE — ED PROVIDER NOTE - PHYSICAL EXAMINATION
CONSTITUTIONAL: Well-appearing; well-nourished; in no apparent distress.   HEAD: Normocephalic; atraumatic.   EYES:  conjunctiva and sclera clear  ENT: normal nose; no rhinorrhea;  NECK: Supple; full ROM  RESPIRATORY: Breathing easily; no resp difficulty  GI: soft nontedner. colostomy with significant bright red blood in bag mixed with stool  EXT: No cyanosis or edema;  SKIN: Normal for age and race; warm; dry; good turgor; no apparent lesions or rash.   NEURO: A & O x 3; face symmetric; grossly unremarkable.   PSYCHOLOGICAL: The patient’s mood and manner are appropriate.

## 2024-02-23 NOTE — ED ADULT TRIAGE NOTE - CHIEF COMPLAINT QUOTE
Pt c/o GABRIEL, hgb 7.7. Last blood transfusion 2 Units 1 week ago. Denies cp, dizziness. LUE PICC for TPN. PMH ileostomy, fistula ostomy.

## 2024-02-23 NOTE — ED PROVIDER NOTE - PATIENT PORTAL LINK FT
You can access the FollowMyHealth Patient Portal offered by [facility] by registering at the following website: [email or web address]. By joining [facility]’s FollowMyHealth portal, you will also be able to view your health information using other applications (apps) compatible with our system.

## 2024-02-23 NOTE — ED PROVIDER NOTE - CLINICAL SUMMARY MEDICAL DECISION MAKING FREE TEXT BOX
here w/ bleeding at colostomy site and dropping hemoglobin as outpatient  cbc stable in the ED but given ongoing bleeding will give 1 u PRBC and have surgery see

## 2024-02-23 NOTE — ED PROVIDER NOTE - NSFOLLOWUPINSTRUCTIONS_ED_ALL_ED_FT
Your hemoglobin remained stable at 8.3 today.     Blood Transfusion, Adult  A blood transfusion is a procedure in which you receive blood or a type of blood cell (blood component) through an IV. You may need a blood transfusion when you have a low blood count, which is a low number of any blood cell. This may result from a bleeding disorder, illness, injury, or surgery. The blood may come from a donor, or you may be able to have your own blood collected and stored (autologous blood donation) before a planned surgery.    The blood given in a transfusion may be made up of different blood components. You may receive:  Red blood cells. These carry oxygen to the cells in the body.  Platelets. These help your blood to clot.  Plasma. This is the liquid part of your blood. It carries proteins and other substances throughout the body.  White blood cells. These help you fight infections.  If you have hemophilia or another clotting disorder, you may also receive other types of blood products.    Depending on the type of blood product, this procedure may take 1–4 hours to complete.    Tell a health care provider about:  Any bleeding problems you have.  Any previous reactions you have had during a blood transfusion.  Any allergies you have.  All medicines you are taking, including vitamins, herbs, eye drops, creams, and over-the-counter medicines.  Any surgeries you have had.  Any medical conditions you have.  Whether you are pregnant or may be pregnant.  What are the risks?  Talk with your health care provider about risks.  The most common problems include:  A mild allergic reaction, such as red, swollen areas of skin (hives) and itching.  Fever or chills. This may be the body's response to new blood cells received. This may occur during or up to 4 hours after the transfusion.  More serious problems may include:  A serious allergic reaction that causes difficulty breathing or swelling around the face and lips.  Transfusion-associated circulatory overload (TACO), or too much fluid in the lungs. This may cause breathing problems.  Transfusion-related acute lung injury (TRALI), which causes breathing difficulty and low oxygen in the blood. This can occur within hours of the transfusion or several days later.  Iron overload. This can happen after receiving many blood transfusions over a period of time.  Infection or virus being transmitted. This is rare because donated blood is carefully tested before it is given.  Hemolytic transfusion reaction. This is rare. It happens when the body's defense system (immune system)tries to attack the new blood cells. Symptoms may include fever, chills, nausea, low blood pressure, and low back or chest pain.  Transfusion-associated wsqeu-iuhfyo-vtpj disease (TAGVHD). This is rare. It happens when donated cells attack the body's healthy tissues.  What happens before the procedure?  You will have a blood test to check your blood type. This test is done to know what kind of blood your body will accept and to match it to the donor blood.  If you are going to have a planned surgery, you may be able to do an autologous blood donation. This may be done in case you need to have a transfusion.  You will have your temperature, blood pressure, and pulse checked before the transfusion.  If you have had an allergic reaction to a transfusion in the past, you may be given medicine to help prevent a reaction. This medicine may be given to you by mouth (orally) or through an IV.  What happens during the procedure?  A person receiving a blood transfusion through a vein in the arm.  An IV will be inserted into one of your veins.  The bag of blood will be attached to your IV. The blood will then enter through your vein.  Your temperature, blood pressure, and pulse will be monitored during the transfusion. This monitoring is done to detect early signs of a transfusion reaction.  Tell your nurse right away if you have any of these symptoms during the transfusion:  Shortness of breath or trouble breathing.  Chest or back pain.  Fever or chills.  Itching or hives.  If you have any signs or symptoms of a reaction, your transfusion will be stopped and you may be given medicine.  When the transfusion is complete, your IV will be removed.  Pressure may be applied to the IV site for a few minutes.  A bandage (dressing)will be applied.  The procedure may vary among health care providers and hospitals.    What happens after the procedure?  Your temperature, blood pressure, pulse, breathing rate, and blood oxygen level will be monitored until you leave the hospital or clinic.  Your blood may be tested to see how you have responded to the transfusion.  You may be warmed with fluids or blankets to maintain a normal body temperature.  If you receive your blood transfusion in an outpatient setting, you will be told whom to contact to report any reactions.  Where to find more information  Visit the [employer] [place]: [email or web address]  Summary  A blood transfusion is a procedure in which you receive blood or a type of blood cell (blood component) through an IV.  The blood given in a transfusion may be made up of different blood components. You may receive red blood cells, platelets, plasma, or white blood cells depending on the condition treated.  Your temperature, blood pressure, and pulse will be monitored before, during, and after the transfusion.  After the transfusion, your blood may be tested to see how your body has responded.  This information is not intended to replace advice given to you by your health care provider. Make sure you discuss any questions you have with your health care provider

## 2024-02-23 NOTE — ED ADULT NURSE NOTE - NSFALLHARMRISKINTERV_ED_ALL_ED
Assistance OOB with selected safe patient handling equipment if applicable/Assistance with ambulation/Communicate risk of Fall with Harm to all staff, patient, and family/Provide visual cue: red socks, yellow wristband, yellow gown, etc/Reinforce activity limits and safety measures with patient and family/Bed in lowest position, wheels locked, appropriate side rails in place/Call bell, personal items and telephone in reach/Instruct patient to call for assistance before getting out of bed/chair/stretcher/Non-slip footwear applied when patient is off stretcher/[place] to call system/Physically safe environment - no spills, clutter or unnecessary equipment/Purposeful Proactive Rounding/Room/bathroom lighting operational, light cord in reach Assistance OOB with selected safe patient handling equipment if applicable/Assistance with ambulation/Communicate risk of Fall with Harm to all staff, patient, and family/Monitor gait and stability/Provide patient with walking aids/Provide visual cue: red socks, yellow wristband, yellow gown, etc/Reinforce activity limits and safety measures with patient and family/Bed in lowest position, wheels locked, appropriate side rails in place/Call bell, personal items and telephone in reach/Instruct patient to call for assistance before getting out of bed/chair/stretcher/Non-slip footwear applied when patient is off stretcher/[place] to call system/Physically safe environment - no spills, clutter or unnecessary equipment/Purposeful Proactive Rounding/Room/bathroom lighting operational, light cord in reach

## 2024-02-23 NOTE — ED PROVIDER NOTE - NSICDXPASTMEDICALHX_GEN_ALL_CORE_FT
PAST MEDICAL HISTORY:  Atrial fibrillation s/p cardioversion 2010 and 2014  Pt. reports 4 DCCV  Now on Amiodarone 200mg PO bid and Eliquis 5mg PO bid  Last DCCV 4 yrs ago at [facility]    Bradycardia     Crohn's disease s/p partial resection of ileum    Essential hypertension Hypertension    History of depression On Venlafaxine ER 150mg PO bid    Hyperlipidemia     Hypothyroidism     Junctional rhythm

## 2024-02-23 NOTE — ED ADULT NURSE NOTE - NS ED NOTE ABUSE RESPONSE YN
Yes 10.8   5.97  )-----------( 238      ( 07 Feb 2020 00:10 )             31.9       02-07    134<L>  |  103  |  35<H>  ----------------------------<  143<H>  3.7   |  19  |  0.9    Ca    7.9<L>      07 Feb 2020 01:30          < from: CT Chest w/ IV Cont (12.16.19 @ 21:05) >    IMPRESSION:    No evidence of acute pulmonary emboli.    Bilateral moderate pleural effusions. Mild cardiomegaly with reflux of   contrast within the IVC and hepatic veins compatible with right heart   dysfunction.    Multiple chest wall soft tissue nodular lesions with reactive bony   changes of the adjacent ribs bilaterally. Partially imaged upper   intra-abdominal lymphadenopathy. Mottled appearance of the vertebral   column as above. Findings are consistent with patient's known metastatic   disease.    < end of copied text >

## 2024-02-23 NOTE — ED PROVIDER NOTE - OBJECTIVE STATEMENT
78M w PMH Crohn's, AFib/Flutter s/p DCCVs on amiodarone, remote ileocectomy and open appendectomy. Admitted (6/23) for SBO vs Crohns flare, s/p NGT decompression and s/p lap converted to open TRE, SBR x 3, left in discontinuity with abthera vac on (6/27), RTOR for ileocolic resection, small bowel anastomosis, and abdominal wall closure on (6/28), c/b fluid collection s/p IR aspiration of perihepatic fluid on (7/3), c/b wound dehiscence s/p RTOR exlap, washout, ileocolic resection with end ileostomy, blow hole colostomy, red rubber from ileostomy to small bowel anastomosis; vicryl bridging mesh on (7/5) transferred to SICU postoperatively for hemodynamic monitoring, with hospital course complicated by periods of severe ELVI and hyponatremia, which resolved but stepped back up for to SICU on (9/10) for acute AMS, intermittent hypoglycemia, AFib with RVR. Percutaneous Cholecystostomy placed on (9/11) for enlarged GB, PCT capped 10/5 and uncapped 10/25 for hyperbilirubinemia, Right brachial DVT, left basillic and cephalic superficial thrombus on duplex 11/2, CT 11/14 with ALDEN ground glass opacity of unclear etiology, completed empiric 7day cefepime course 11/22, and another course of 7day cefepime for PNA  (1/15-23), hyperbilirubinemia of unclear etiology, resolved candida glabrata fungemia, ELVI resolved, second episode of sinus pause--pacemaker placed (2/5). cystic duct open, Perc cony removed 2/8. Discharged home 2/12/24. Patient now presenting to ED for blood transfusion after noted to have a Hgb of 7.7 on routine lab work at home and active bleeding from colostomy.
151.8

## 2024-02-23 NOTE — CONSULT NOTE ADULT - ASSESSMENT
Assessment:   78M w PMH Crohn's, AFib/Flutter s/p DCCVs on amiodarone, remote ileocectomy and open appendectomy. Admitted (6/23) for SBO vs Crohns flare, s/p NGT decompression and s/p lap converted to open TRE, SBR x 3, left in discontinuity with abthera vac on (6/27), RTOR for ileocolic resection, small bowel anastomosis, and abdominal wall closure on (6/28), c/b fluid collection s/p IR aspiration of perihepatic fluid on (7/3), c/b wound dehiscence s/p RTOR exlap, washout, ileocolic resection with end ileostomy, blow hole colostomy, red rubber from ileostomy to small bowel anastomosis; vicryl bridging mesh on (7/5) and was kept in the SICU for multiple different ailments as well as for aggressive wound care. Patient was discharged home 2/12/24. Returned 2/16 to ED for Hgb in 6s, s/p 2 unit prbcs. Patient now presents after granulation tissue surrounding ECF continues to ooze, found to have Hgb 8.2 (7.7). Patient was seen by wound care nursing team and ECF pouch was changed after achieving hemostasis of small area of oozing just right inferolateral to ECF w/ hemostatic powder, transfused 1 unit pRBC.     Plan:  - No acute surgical intervention at this time  - Will tranfuse 1 unit pRBC in setting of symptoms and active slow bleeding from pouch  - Wound care nursing team to see patient for hemostasis  Plan discussed with attending surgeon, Dr. Peterson

## 2024-02-23 NOTE — CONSULT NOTE ADULT - SUBJECTIVE AND OBJECTIVE BOX
78M w PMH Crohn's, AFib/Flutter s/p DCCVs on amiodarone, remote ileocectomy and open appendectomy. Admitted (6/23) for SBO vs Crohns flare, s/p NGT decompression and s/p lap converted to open TRE, SBR x 3, left in discontinuity with abthera vac on (6/27), RTOR for ileocolic resection, small bowel anastomosis, and abdominal wall closure on (6/28), c/b fluid collection s/p IR aspiration of perihepatic fluid on (7/3), c/b wound dehiscence s/p RTOR exlap, washout, ileocolic resection with end ileostomy, blow hole colostomy, red rubber from ileostomy to small bowel anastomosis; vicryl bridging mesh on (7/5) transferred to SICU postoperatively for hemodynamic monitoring, with hospital course complicated by periods of severe ELVI and hyponatremia, which resolved but stepped back up for to SICU on (9/10) for acute AMS, intermittent hypoglycemia, AFib with RVR. Percutaneous Cholecystostomy placed on (9/11) for enlarged GB, PCT capped 10/5 and uncapped 10/25 for hyperbilirubinemia, Right brachial DVT, left basillic and cephalic superficial thrombus on duplex 11/2, CT 11/14 with ALDEN ground glass opacity of unclear etiology, completed empiric 7day cefepime course 11/22, and another course of 7day cefepime for PNA  (1/15-23), hyperbilirubinemia of unclear etiology, resolved candida glabrata fungemia, ELVI resolved, second episode of sinus pause--pacemaker placed (2/5). cystic duct open, Perc cony removed 2/8. Discharged home 2/12/24. Recently presented to ED w/ Hgb of 6.5 after routine lab work at home, s/p 2 unit prbcs (2/16). Now presenting w/ continued oozing from the granulation tissue surrounding his ECF.      He reports that over the last few days, there was variable amounts of blood in his pouch. Today, his home nurse reported that there was more blood tinged output than usual in the bag today, with a few areas of active slow oozing. He reports feeling fine until feeling more fatigued today. His most recent lab work on Wednesday showed Hgb 7.7.     In the ED, VS wnl. On exam, patient remains jaundiced, abdomen is soft, NTND. Minimal oozing noted on right lateral side of fistula. Some oozing along the borders of the appliance as well. Labs significant for Hgb 8.2 (7.7). No new imaging necesary.    T(C): 36.3 (02-23-24 @ 12:36), Max: 36.3 (02-23-24 @ 12:36)  HR: 110 (02-23-24 @ 12:36) (110 - 110)  BP: 102/64 (02-23-24 @ 12:36) (102/64 - 102/64)  RR: 18 (02-23-24 @ 12:36) (18 - 18)  SpO2: 96% (02-23-24 @ 12:36) (96% - 96%)    Physical Exam  General: AAOx3, NAD, laying comfortably in bed  Cardio: S1,S2, No MRG  Pulm: Nonlabored breathing  Abdomen: soft, NTND. Minimal oozing noted on right inferolateral side of fistula. Some oozing along the borders of the appliance as well.   Extremities: WWP, peripheral pulses appreciated  Skin: jaundiced.       LABS:                        8.2    9.77  )-----------( 197      ( 23 Feb 2024 13:08 )             26.4     02-23    136  |  105  |  34<H>  ----------------------------<  102<H>  5.1   |  23  |  1.34<H>    Ca    8.4      23 Feb 2024 13:08    TPro  8.6<H>  /  Alb  2.5<L>  /  TBili  4.4<H>  /  DBili  x   /  AST  183<H>  /  ALT  103<H>  /  AlkPhos  131<H>  02-23    PT/INR - ( 23 Feb 2024 13:08 )   PT: 14.6 sec;   INR: 1.29          PTT - ( 23 Feb 2024 13:08 )  PTT:33.9 sec

## 2024-02-23 NOTE — ED ADULT NURSE NOTE - NSICDXPASTMEDICALHX_GEN_ALL_CORE_FT
PAST MEDICAL HISTORY:  Atrial fibrillation s/p cardioversion 2010 and 2014  Pt. reports 4 DCCV  Now on Amiodarone 200mg PO bid and Eliquis 5mg PO bid  Last DCCV 4 yrs ago at [facility]    Bradycardia     Crohn's disease s/p partial resection of ileum    Essential hypertension Hypertension    History of depression On Venlafaxine ER 150mg PO bid    Hyperlipidemia     Hypothyroidism     Junctional rhythm

## 2024-02-28 NOTE — CONSULT NOTE ADULT - ASSESSMENT
Assessment:  78M w PMH Crohn's, AFib/Flutter s/p DCCVs on amiodarone, remote ileocectomy and open appendectomy. Admitted (6/23) for SBO vs Crohns flare, s/p NGT decompression and s/p lap converted to open TRE, SBR x 3, left in discontinuity with abthera vac on (6/27), RTOR for ileocolic resection, small bowel anastomosis, and abdominal wall closure on (6/28), c/b fluid collection s/p IR aspiration of perihepatic fluid on (7/3), c/b wound dehiscence s/p RTOR exlap, washout, ileocolic resection with end ileostomy, blow hole colostomy, red rubber from ileostomy to small bowel anastomosis; vicryl bridging mesh on (7/5) and was kept in the SICU for multiple different ailments as well as for aggressive wound care. Patient was discharged home 2/12/24. Returned 2/16 and 2/23 for oozing from granulation tissue surrounding ECF. Now presents with x1 day of centralized abdominal pain, nausea, vomiting and high output from ECF.     Plan:  - c/w infectious source workup as per medicine  - MRCP  - Will discuss with hepatobiliary service prior to consideration of percutaneous cholecystostomy tube placement  Team 4c and 5c will continue to follow, please call with any questions or concerns  Plan discussed with chief resident and attending, Dr. Alfredo Assessment:  78M w PMH Crohn's, AFib/Flutter s/p DCCVs on amiodarone, remote ileocectomy and open appendectomy. Admitted (6/23) for SBO vs Crohns flare, s/p NGT decompression and s/p lap converted to open TRE, SBR x 3, left in discontinuity with abthera vac on (6/27), RTOR for ileocolic resection, small bowel anastomosis, and abdominal wall closure on (6/28), c/b fluid collection s/p IR aspiration of perihepatic fluid on (7/3), c/b wound dehiscence s/p RTOR exlap, washout, ileocolic resection with end ileostomy, blow hole colostomy, red rubber from ileostomy to small bowel anastomosis; vicryl bridging mesh on (7/5) and was kept in the SICU for multiple different ailments as well as for aggressive wound care. Patient was discharged home 2/12/24. Returned 2/16 and 2/23 for oozing from granulation tissue surrounding ECF. Now presents with x1 day of centralized abdominal pain, nausea, vomiting and high output from ECF.     Plan:  - c/w infectious source workup as per medicine  - C/w IV Abx  - f/u MRCP  - Will discuss with hepatobiliary service prior to consideration of percutaneous cholecystostomy tube placement  - hold Gattex as it can cause/exacerbate biliary disease  Team 4c and 5c will continue to follow, please call with any questions or concerns  Plan discussed with chief resident and attending, Dr. Peterson

## 2024-02-28 NOTE — ED PROVIDER NOTE - PHYSICAL EXAMINATION
CONSTITUTIONAL: tired, frail appearing  HEAD: Normocephalic; atraumatic.   EYES:  conjunctiva and sclera clear  ENT: normal nose; no rhinorrhea;  NECK: Supple; full ROM  RESPIRATORY: Breathing easily; no resp difficulty  GI: +enterocutaneous fistula mild green output,, non tender. L sided stoma w/ copious watery green output without blood. abdomen soft, multiple well healed scars seen.   EXT: No cyanosis or edema;  SKIN: Normal for age and race; warm; dry; good turgor; no apparent lesions or rash.   NEURO: A & O x 3; face symmetric; grossly unremarkable.   PSYCHOLOGICAL: The patient’s mood and manner are appropriate.

## 2024-02-28 NOTE — H&P ADULT - PROBLEM SELECTOR PLAN 5
Cr 1.4 on admission. Baseline 1.1-1.2. suspect prerenal etiology given volume status and increased ostomy output  -Urine lytes   -Fluid resuscitation   -Trend Cr, Avoid nephrotoxic meds   -Strict I/O's and monitoring of ostomy output CT chest: Slightly increased mild right greater than left pleural effusions with subjacent atelectasis. Few patchy nodular opacities RLL. Satting well on room air at this time, breathing comfortably   -Incentive spirometry   -Duonebs PRN

## 2024-02-28 NOTE — H&P ADULT - PROBLEM SELECTOR PLAN 2
home med: venlafaxine 150 mg BID  -Recommend continuing home med history of Short bowel syndrome on last admission, high output w/ EC fistula on TPN at home  - hold home gattrex at this time  -Recommend continuing TPN in AM  -Q6H fingersticks   -Monitor EC fistula output

## 2024-02-28 NOTE — H&P ADULT - PROBLEM SELECTOR PLAN 3
Diagnosis of AFib/Flutter s/p DCCVs.home med: amiodarone 200 mg QD, lopressor 50 mg BID PO, currently 100-110s   -Continue amiodarone   -hold  BB for now given sepsis. Resume as appropriate   -f/u TSH level Cr 1.4 on admission. Baseline 1.1-1.2. suspect prerenal etiology given volume status and increased ostomy output  -Urine lytes   -Fluid resuscitation   -Trend Cr, Avoid nephrotoxic meds   -Strict I/O's and monitoring of ostomy output

## 2024-02-28 NOTE — ED PROVIDER NOTE - CLINICAL SUMMARY MEDICAL DECISION MAKING FREE TEXT BOX
today presenting with N/V, abdominal discomfort and leukocytosis,   ICU consulted for sepsis  IV abx given  IV hydration given  appreciate surgical and medical consult, to admit 7lach for further workup, monitoring and treatment

## 2024-02-28 NOTE — H&P ADULT - PROBLEM SELECTOR PLAN 7
home med: Synthroid 50 mcg QD  -Recommend obtaining TSH level in AM   -Continue home med home med: venlafaxine 150 mg BID  -Recommend continuing home med

## 2024-02-28 NOTE — CONSULT NOTE ADULT - ASSESSMENT
78M w PMH Crohn's, AFib/Flutter s/p DCCVs on amiodarone, remote ileocectomy, prolonged hospital course ([id number] for SBO vs Crohns flare, s/p open TRE, multiple small bowel resections, c/b wound dehiscence s/p RTOR exlap, washout, ileocolic resection with end ileostomy, blow hole colostomy, red rubber from ileostomy to small bowel anastomosis; vicryl bridging mesh on (7/5), further c/b ELVI, PCT placement for enlarged GB (since removed 2/8/24), with biliary cx revealing Carb resistant Psa and Ecoli, further c/b DVT, candida glabrata fungemia, sinus pauses requiring pacemaker (2/5/24), recent 2/14/24 visit to ED for transfusion, now presenting with N/V, abdominal discomfort and leukocytosis, ICU consulted for sepsis     NEURO   #Depression   home med: venlafaxine 150 mg BID  -Recommend continuing home med     CARDS  #Sepsis   Meeting SIRS criteria with -110s with leukocytosis with unknown source. Extensive surgery course as mentioned above with EC fistula c/b biliary cx revealing Carbapenem res Psa and Ecoli. CT A/P revealed mildly enlarged GB with mild pericholecystic fluid. S/p cefepime in ED. Abdominal exam benign   -RUQ US  -Recommend cefepime renally dosing. Will require ID approval   -Recommend additional 2L LR given hypovolemia   -Recommend wound care consult in AM     #Afib   Diagnosis of AFib/Flutter s/p DCCVs.home med: amiodarone 200 mg QD, lopressor 50 mg BID PO, currently 100-110s   -Continue amiodarone   -Recommend holding BB for now given sepsis. Resume as appropriate   -Recommend TSH level     PULM  CT chest: Slightly increased mild right greater than left pleural effusions with subjacent atelectasis. Few patchy nodular opacities RLL. Satting well on room air at this time, breathing comfortably   -Incentive spirometry   -Duonebs PRN     RENAL   #ELVI   Cr 1.4 on admission. Baseline 1.1-1.2. suspect prerenal etiology given volume status and increased ostomy output  -Urine lytes   -Fluid resuscitation   -Trend Cr, Avoid nephrotoxic meds   -Strict I/O's and monitoring of ostomy output     GI  #Nutrition   history of Short bowel syndrome on last admission, high output w/ EC fistula on TPN at home  -Recommend continuing TPN in AM  -Q6H fingersticks   -Monitor EC fistula output    ENDO  #Hypothyroidism   home med: Synthroid 50 mcg QD  -Recommend obtaining TSH level in AM   -Continue home med     ID  #Sepsis   Extensive abdominal surgery history with multiple complications. Last admission: Completed cefepime for PNA (1/15-23) BCx 11/22 grew candida glabrata, likely CLABSI. s/p Caspo & completed Fluconazole course (12/1-12/9), caspofungin (11/24-12/1). cefepime (11/15-11/22), C. tertium, Lactobacillus from IR cx 7/3, and candida albicans, lactobacillus, vanc sensitive E faecium, vanc resistant E gallinarum, vanc resistant E casseliflavus, lactobacillus from OR cx 7/5. Completed course of abx with Imipenem (9/10-9/15). Imipenem (8/26--) imipenem (6/30-7/12, 7/23-7/30), Dapto (6/30-7/5 and 7/23-7/24). Empiric Dapto (8/23-24) and Cefepime (8/23-24)    -F/u bcx/ucx  -Continue cefepime as above   -RUQ US   -Recommend formal ID consult in AM     Plan:  F: s/p 2.5 NS  E: replete K<4, Mg<2  N: regular  VTE Prophylaxis: Lovenox   GI: None   C: Full Code  D: 07Lachman     Dispo: 07Lachman   Discussed with Dr. Holley

## 2024-02-28 NOTE — H&P ADULT - PROBLEM SELECTOR PLAN 1
Meeting SIRS criteria with -110s with leukocytosis with unknown source. Extensive surgery course as mentioned above with EC fistula c/b biliary cx revealing Carbapenem res Psa and Ecoli.   CT A/P revealed mildly enlarged GB with mild pericholecystic fluid. S/p cefepime in ED. Abdominal exam benign   -RUQ US  -cw cefepime renally dosing. Will require ID approval   - start 2L LR given hypovolemia for resucitation   -obtain wound care consult in AM  - formal ID consult in am Meeting SIRS criteria with -110s with leukocytosis with unknown source. Extensive surgery course as mentioned above with EC fistula c/b biliary cx revealing Carbapenem res Psa and Ecoli.   CT A/P revealed mildly enlarged GB with mild pericholecystic fluid. S/p cefepime in ED. Abdominal exam benign   -RUQ US  -cw cefepime renally dosing. Will require ID approval   - start 2L LR given hypovolemia for resucitation   -obtain wound care consult in AM  - Surgical teams following, considering perc cony tomorrow  - formal ID consult in am

## 2024-02-28 NOTE — H&P ADULT - PROBLEM SELECTOR PLAN 8
F: s/p 2.5 NS, now receieving 2L LR   E: replete K<4, Mg<2  N: regular  VTE Prophylaxis: Lovenox   GI: None   C: Full Code  D: 07Lachman

## 2024-02-28 NOTE — ED ADULT NURSE NOTE - ED CARDIAC RHYTHM
[facility]  Progress Note - Kwan Tsang 1959, 65 y o  female MRN: [id number]  Unit/Bed#: ICU 10 Encounter: [id number]  Primary Care Provider: Bradley Otero DO   Date and time admitted to hospital: 6/15/2022  2:20 AM    * Acute respiratory failure with hypoxia ([facility] [address] )  Assessment & Plan  · Transferred to the ICU when she developed acute SOB, increased WOB- likely multifactorial related to pulmonary edema, COPD and left sided pneumonia, possibly aspiration  · Used BiPAP for 2 hrs overnight and removed due to feeling warm   · On 6L O2 via NC     Plan:   · Will continue oxygen therapy for now with a goal to maintain her sats > 90%  · Will continue the IV rocephin   · Follow Blood cx  · Steroid taper - solumedrol 40 mg daily   · BiPAP HS     Sepsis ([facility] [address] )  Assessment & Plan  · Tachycardic, tachypneic  LLL infiltrate, possible aspiration pneumonia    Plan:  · Will continue rocephin   · Given pulmonary edema, would not continue with fluid resuscitation  · Follow blood cx   · Monitor hemodynamics   · Am CBC    SHANTA (acute kidney injury) ([facility] )  Assessment & Plan  Baseline 0 9-1 2  Rise in Cr to 1 61  Likely in the setting of sepsis, CHF exacerbation, maybe diuretic use  Output in the last 24 hrs 1 7L     Plan:   · Hold diuresis - does not examen to be overloaded  · Cardiology following    · Monitor I/O, daily wt  · Avoid hypotension, nephrotoxins     Chronic obstructive pulmonary disease with acute exacerbation ([facility] )  Assessment & Plan  In the setting of PNA       Plan:   · continue solumedrol taper   · continue bronchodilator therapy  · Abx - rocephin   · Incentive spirometry     Acute on chronic combined systolic and diastolic congestive heart failure (HCC)  Assessment & Plan  Wt Readings from Last 3 Encounters:   06/16/22 76 5 kg (168 lb 10 4 oz)   07/13/21 71 7 kg (158 lb)   06/15/21 68 kg (149 lb 14 6 oz)     · Home meds: Metoprolol 50 mg daily, Losartan 50 mg daily   · ECHO - LVEF 41%  Basal regional wall motion abnormality in inferiorl, inferolateral and anteroseptal region  Likely stress induced from underlying pneumonia/sepsis  · Output 24 hr -1 7 L     Plan:   · Hold lasix - given rise in Creatinine  · Daily weights  · I/Os - leung in place   · Aim for even to negative fluid balance  · Metoprolol 25 mg daily     Chronic pain  Assessment & Plan  · Will continue her outpatient neurontin  · PRN oxydocone     ----------------------------------------------------------------------------------------  HPI/24hr events:  Nursing team reports no adverse events overnight  Patient tolerated BiPAP use for this than 2 hours and removed due to feeling warm  No fevers overnight  Patient appropriate for transfer out of the ICU today?: Patient does not meet criteria for ICU Follow-up Clinic; referral has not been made  Disposition: Transfer to Med-Surg   Code Status: Level 1 - Full Code  ---------------------------------------------------------------------------------------  SUBJECTIVE  Patient reported unable to sleep overnight, and at that it is an ongoing issue at as well, for which she takes Percocet and clonazepam   Patient reported coughing with inability to bring up sputum, feeling warm overnight, and mildly diaphoretic  Patient denied headache, chills, shortness of breath, chest tightness  Telemetry review showed sinus tachycardia, HR low 100s  No events overnight  Review of Systems   Constitutional: Positive for diaphoresis and fever  Negative for chills and fatigue  HENT: Negative for nosebleeds, postnasal drip and sore throat  Eyes: Negative for visual disturbance  Respiratory: Positive for cough  Negative for chest tightness, shortness of breath, wheezing and stridor  Cardiovascular: Negative for chest pain, palpitations and leg swelling  Gastrointestinal: Negative for abdominal pain, constipation, diarrhea, nausea and vomiting     Genitourinary: Negative for difficulty urinating, dysuria, flank pain and hematuria  Musculoskeletal: Negative for arthralgias, back pain and neck pain  Skin: Negative for color change  Neurological: Negative for dizziness, weakness, light-headedness and headaches  Psychiatric/Behavioral: Negative for confusion and decreased concentration  Review of systems was reviewed and negative unless stated above in HPI/24-hour events   ---------------------------------------------------------------------------------------  OBJECTIVE    Vitals   Vitals:    22 0600 22 0700 22 0756 22 0800   BP: 141/88 140/90  145/86   BP Location:    Right arm   Pulse: 102 100  98   Resp: (!) 25 (!) 26  22   Temp:   98 3 °F (36 8 °C)    TempSrc:   Temporal    SpO2: 94% 94% 95% 97%   Weight:       Height:         Temp (24hrs), Av 7 °F (37 1 °C), Min:97 °F (36 1 °C), Max:99 8 °F (37 7 °C)  Current: Temperature: 98 3 °F (36 8 °C)          Respiratory:  SpO2: SpO2: 97 %, SpO2 Activity: SpO2 Activity: At Rest  Nasal Cannula O2 Flow Rate (L/min): 5 L/min    Invasive/non-invasive ventilation settings   Respiratory  Report   Lab Data (Last 4 hours)    None         O2/Vent Data (Last 4 hours)    None                Physical Exam  Vitals and nursing note reviewed  Constitutional:       General: She is not in acute distress  Appearance: Normal appearance  She is overweight  She is not ill-appearing or diaphoretic  HENT:      Head: Normocephalic and atraumatic  Mouth/Throat:      Mouth: Mucous membranes are moist       Pharynx: Oropharynx is clear  Eyes:      General: No scleral icterus  Conjunctiva/sclera: Conjunctivae normal    Cardiovascular:      Rate and Rhythm: Normal rate and regular rhythm  Pulses: Normal pulses  Heart sounds: Normal heart sounds  No murmur heard  No gallop  Pulmonary:      Effort: Pulmonary effort is normal  No respiratory distress        Breath sounds: Examination of the right-lower field reveals rales  Examination of the left-lower field reveals rales  Rales present  No wheezing  Comments: On 6 L O2 via NC  Abdominal:      General: Bowel sounds are normal  There is no distension  Palpations: Abdomen is soft  There is no mass  Tenderness: There is no abdominal tenderness  Musculoskeletal:         General: Normal range of motion  Cervical back: Normal range of motion and neck supple  Right lower leg: No edema  Left lower leg: No edema  Skin:     General: Skin is warm and dry  Capillary Refill: Capillary refill takes less than 2 seconds  Coloration: Skin is not jaundiced or pale  Neurological:      General: No focal deficit present  Mental Status: She is alert and oriented to person, place, and time  Mental status is at baseline  Sensory: No sensory deficit     Psychiatric:         Mood and Affect: Mood normal          Behavior: Behavior normal              Laboratory and Diagnostics:  Results from last 7 days   Lab Units 06/16/22  0447 06/15/22  0503 06/14/22  2204   WBC Thousand/uL 17 38* 23 07* 15 74*   HEMOGLOBIN g/dL 11 5 14 1 14 0   HEMATOCRIT % 34 0* 43 3 42 1   PLATELETS Thousands/uL 262 367 307   NEUTROS PCT % 92* 83* 84*   MONOS PCT % 2* 7 6     Results from last 7 days   Lab Units 06/16/22  0447 06/14/22 2204   SODIUM mmol/L 131* 131*   POTASSIUM mmol/L 3 6 3 9   CHLORIDE mmol/L 99* 99   CO2 mmol/L 21 19*   ANION GAP mmol/L 11 13   BUN mg/dL 31* 19   CREATININE mg/dL 1 61* 1 24   CALCIUM mg/dL 8 7 10 1   GLUCOSE RANDOM mg/dL 150* 142*   ALT U/L 22 7   AST U/L 26 14   ALK PHOS U/L 84 90   ALBUMIN g/dL 3 2* 4 6   TOTAL BILIRUBIN mg/dL 0 31 1 08*     Results from last 7 days   Lab Units 06/16/22  0447 06/14/22  2204   MAGNESIUM mg/dL 2 0 1 7*      Results from last 7 days   Lab Units 06/14/22 2204   INR  1 22*   PTT seconds 34          Results from last 7 days   Lab Units 06/15/22  0503 06/14/22 2204   LACTIC ACID mmol/L 1 9 1 0 irregular ABG:  Results from last 7 days   Lab Units 06/15/22  0428   PH ART  7 165*   PCO2 ART mm Hg 57 6*   PO2 ART mm Hg 134 0*   HCO3 ART mmol/L 20 3*   BASE EXC ART mmol/L -8 8   ABG SOURCE  Radial, Left     VBG:  Results from last 7 days   Lab Units 06/15/22  0428 06/14/22  2204   PH PANDA   --  7 481*   PCO2 PANDA mm Hg  --  30 2*   PO2 PANDA mm Hg  --  57 4*   HCO3 PANDA mmol/L  --  22 0*   BASE EXC PANDA mmol/L  --  -0 4   ABG SOURCE  Radial, Left  --      Results from last 7 days   Lab Units 06/16/22  0447 06/15/22  0503 06/14/22  2204   PROCALCITONIN ng/ml 17 80* 0 21 0 06       Micro  Results from last 7 days   Lab Units 06/15/22  0503 06/14/22  2206 06/14/22  2204   BLOOD CULTURE   --  No Growth at 24 hrs  No Growth at 24 hrs  LEGIONELLA URINARY ANTIGEN  Negative  --   --    STREP PNEUMONIAE ANTIGEN, URINE  Negative  --   --        EKG:   Imaging: I have personally reviewed pertinent reports  Intake and Output  I/O       06/14 0701  06/15 0700 06/15 0701  06/16 0700 06/16 0701  06/17 0700    Urine (mL/kg/hr) 325 1725 (0 9)     Total Output 325 1725     Net -325 -1725                  Height and Weights   Height: 5' 7" (170 2 cm)     Body mass index is 26 41 kg/m²  Weight (last 2 days)     Date/Time Weight    06/16/22 0538 76 5 (168 65)    06/15/22 0926 77 1 (170)    06/15/22 0452 77 2 (170 2)            Nutrition       Diet Orders   (From admission, onward)             Start     Ordered    06/16/22 0714  Diet Cardiovascular; Sodium 2 GM; Fluid Restriction 1500 ML  Diet effective now        References:    Nutrtion Support Algorithm Enteral vs  Parenteral   Question Answer Comment   Diet Type Cardiovascular    Cardiac Sodium 2 GM    Other Restriction(s): Fluid Restriction 1500 ML    RD to adjust diet per protocol?  Yes        06/16/22 0714                  Active Medications  Scheduled Meds:  Current Facility-Administered Medications   Medication Dose Route Frequency Provider Last Rate    acetaminophen  650 mg Oral Q6H PRN ESTHER Spear      cefTRIAXone  1,000 mg Intravenous Q24H Jp Calista Bucio 1,000 mg (06/15/22 0927)    guaiFENesin  600 mg Oral Q12H [address] Sarah Rae MD      heparin (porcine)  5,000 Units Subcutaneous [facility] Luis Armando Petite, [address]      ipratropium-albuterol  3 mL Nebulization Q6H Luis Armando ESTHER Mares      lidocaine  1 patch Topical Daily Lola Risk Lowelan, [address]      methylPREDNISolone sodium succinate  40 mg Intravenous Daily Sarah Rae MD      metoprolol succinate  25 mg Oral Daily Luis ArmandoESTHER Palacios      oxyCODONE  2 5 mg Oral Q6H PRN ESTHER Spear      Or    oxyCODONE  5 mg Oral Q6H PRN Eusoniyae ESTHER Cruz       Continuous Infusions:     PRN Meds:   acetaminophen, 650 mg, Q6H PRN  oxyCODONE, 2 5 mg, Q6H PRN   Or  oxyCODONE, 5 mg, Q6H PRN        Invasive Devices Review  Invasive Devices  Report    Peripheral Intravenous Line  Duration           Peripheral IV 06/14/22 Right Antecubital 2 days    Peripheral IV 06/16/22 Distal;Left;Ventral (anterior) Forearm <1 day          Drain  Duration           Urethral Catheter Temperature probe 1 day                  Tobacco and Toxic Substance Assessment and Intervention:     Tobacco use screening performed    Tobacco cessation counseling provided      Rationale for remaining devices:  Continued medical care  ---------------------------------------------------------------------------------------  Advance Directive and Living Will:      Power of :    POLST:    ---------------------------------------------------------------------------------------  Care Time Delivered:   See attending attestation      Sarah Rae MD      Portions of the record may have been created with voice recognition software  Occasional wrong word or "sound a like" substitutions may have occurred due to the inherent limitations of voice recognition software    Read the chart carefully and recognize, using context, where substitutions have occurred

## 2024-02-28 NOTE — H&P ADULT - ATTENDING COMMENTS
UC, AF, s/p SBR, ileostomy, enteroatmospheric fistula, now with abdominal pain and leukocytosis  physical as above  admit to telemetry  if BC negative, resume TPN  empiric cefepime  CT of abdomen and US of abdomen with hyperbilirubinemia

## 2024-02-28 NOTE — ED PROVIDER NOTE - CRITICAL CARE ATTENDING CONTRIBUTION TO CARE
Upon my evaluation, this patient had a high probability of imminent or life-threatening deterioration due to sepsis, dehydration which required my direct attention, intervention, and personal management.    I have personally provided critical care time exclusive of time spent on separately billable procedures. Time includes review of laboratory data, radiology results, discussion with consultants, and monitoring for potential decompensation. Interventions were performed as documented above.

## 2024-02-28 NOTE — ED ADULT NURSE NOTE - OBJECTIVE STATEMENT
78M with extensive medial hx including crohns (ostomy with increase in non-bloody watery output x1 day), afib/flutter, anemia (multiple blood transfusions), HLD, HTN and depression presents to ED BIBA with wife and HHA at bedside with reported abd pain x1 day and n/v that started this AM. per HHA, pt last had PO intake yesterday at lunch and was also noted to be lethargic. pt currently awake, follows commands but unable to speak (baseline presentation when patient is "dry"). family at bedside denies pt with fever/chills, cough/congestion, CP/SOB/palpitations or diarrhea. EKG and BG completed. pt placed in hospital gown and on continuous cardiac monitor. pt with PICC to L arm for TPN administration. RR easy, even, un-labored on room air

## 2024-02-28 NOTE — H&P ADULT - ASSESSMENT
78M w PMH Crohn's, AFib/Flutter s/p DCCVs on amiodarone, remote ileocectomy, prolonged hospital course with extensive abdominal surgeires  ([id number] for SBO vs Crohns flare and PCT placement for enlarged GB (since removed 2/8/24), which was further c/b DVT, candida glabrata fungemia, sinus pauses requiring pacemaker (2/5/24)  now admitted for sepsis

## 2024-02-28 NOTE — ED PROVIDER NOTE - NSICDXPASTMEDICALHX_GEN_ALL_CORE_FT
PAST MEDICAL HISTORY:  Atrial fibrillation s/p cardioversion 2010 and 2014  Pt. reports 4 DCCV  Now on Amiodarone 200mg PO bid and Eliquis 5mg PO bid  Last DCCV 4 yrs ago at [facility]    Bradycardia     Crohn's disease s/p partial resection of ileum    Essential hypertension Hypertension    History of depression On Venlafaxine ER 150mg PO bid    Hyperlipidemia     Hypothyroidism     Junctional rhythm

## 2024-02-28 NOTE — ED ADULT TRIAGE NOTE - CHIEF COMPLAINT QUOTE
hx of consistent Afib, has pacemaker and loop recorder, hx of multiple blood transfusions, colostomy, baseline jaundiced, comes to ED c/o generalized weakness, decreased PO intake, nbnb vomiting since today, nausea and increased stool in stoma output since last night with abd. pain. ekg done, blood sugar done.

## 2024-02-28 NOTE — ED ADULT NURSE NOTE - NSFALLRISKINTERV_ED_ALL_ED

## 2024-02-28 NOTE — ED PROVIDER NOTE - OBJECTIVE STATEMENT
78M w PMH Crohn's, AFib/Flutter s/p DCCVs on amiodarone, remote ileocectomy and open appendectomy. Admitted (6/23) for SBO vs Crohns flare, s/p NGT decompression and s/p lap converted to open TRE, SBR x 3, left in discontinuity with abthera vac on (6/27), RTOR for ileocolic resection, small bowel anastomosis, and abdominal wall closure on (6/28), c/b fluid collection s/p IR aspiration of perihepatic fluid on (7/3), c/b wound dehiscence s/p RTOR exlap, washout, ileocolic resection with end ileostomy, blow hole colostomy, red rubber from ileostomy to small bowel anastomosis; vicryl bridging mesh on (7/5) transferred to SICU postoperatively for hemodynamic monitoring, with hospital course complicated by periods of severe ELVI and hyponatremia, which resolved but stepped back up for to SICU on (9/10) for acute AMS, intermittent hypoglycemia, AFib with RVR. Percutaneous Cholecystostomy placed on (9/11) for enlarged GB, PCT capped 10/5 and uncapped 10/25 for hyperbilirubinemia, Right brachial DVT, left basillic and cephalic superficial thrombus on duplex 11/2, CT 11/14 with ALDEN ground glass opacity of unclear etiology, completed empiric 7day cefepime course 11/22, and another course of 7day cefepime for PNA  (1/15-23), hyperbilirubinemia of unclear etiology, resolved candida glabrata fungemia, ELVI resolved, second episode of sinus pause--pacemaker placed (2/5). cystic duct open, Perc cony removed 2/8. Discharged home 2/12/24. Recently presented to ED twice for anemia requiring PRBC transfusion and dc. Returns today for generalized weakness, decreased PO intake, nbnb vomiting since today, nausea and increased stool in stoma output since last night with abd. pain.

## 2024-02-28 NOTE — H&P ADULT - PROBLEM SELECTOR PLAN 6
history of Short bowel syndrome on last admission, high output w/ EC fistula on TPN at home  -Recommend continuing TPN in AM  -Q6H fingersticks   -Monitor EC fistula output home med: Synthroid 50 mcg QD  -Recommend obtaining TSH level in AM   -Continue home med

## 2024-02-28 NOTE — H&P ADULT - NSHPPHYSICALEXAM_GEN_ALL_CORE
General: AAOx3, NAD, laying comfortably in bed, moist mucous membranes but dry skin turgor   Cardio: S1,S2, No MRG  Pulm: Nonlabored breathing  Abdomen: soft, NTND. Minimal oozing noted on right side of fistula  Extremities: WWP, peripheral pulses appreciated  Skin: jaundiced General: AAOx3, NAD, laying comfortably in bed  Cardio: S1,S2, No MRG  Pulm: Nonlabored breathing  Abdomen: soft, NTND. large ostomy bag present with draiange, abdomen is non tender at this time   Extremities: WWP, peripheral pulses appreciated  Skin: slightly jaundiced

## 2024-02-28 NOTE — H&P ADULT - HISTORY OF PRESENT ILLNESS
HPI:  78M w PMH Crohn's, AFib/Flutter s/p DCCVs on amiodarone, remote ileocectomy and open appendectomy. Admitted (6/23) for SBO vs Crohns flare, s/p NGT decompression and s/p lap converted to open TRE, SBR x 3, left in discontinuity with abthera vac on (6/27), RTOR for ileocolic resection, small bowel anastomosis, and abdominal wall closure on (6/28), c/b fluid collection s/p IR aspiration of perihepatic fluid on (7/3), c/b wound dehiscence s/p RTOR exlap, washout, ileocolic resection with end ileostomy, blow hole colostomy, red rubber from ileostomy to small bowel anastomosis; vicryl bridging mesh on (7/5) transferred to SICU postoperatively for hemodynamic monitoring, with hospital course complicated by periods of severe ELVI and hyponatremia, which resolved but stepped back up for to SICU on (9/10) for acute AMS, intermittent hypoglycemia, AFib with RVR. Percutaneous Cholecystostomy placed on (9/11) for enlarged GB, PCT capped 10/5 and uncapped 10/25 for hyperbilirubinemia, Right brachial DVT, left basillic and cephalic superficial thrombus on duplex 11/2, CT 11/14 with ALDEN ground glass opacity of unclear etiology, completed empiric 7day cefepime course 11/22, and another course of 7day cefepime for PNA  (1/15-23), hyperbilirubinemia of unclear etiology, resolved candida glabrata fungemia, ELVI resolved, second episode of sinus pause--pacemaker placed (2/5). cystic duct open, Perc cony removed 2/8. Discharged home 2/12/24. Recently presented to ED twice for anemia requiring PRBC transfusion and dc. Returns today for generalized weakness, decreased PO intake, nbnb vomiting since today, nausea and increased stoma output     ED course:    Vitals: Afebrile, , HR 94/62, Sp02 94 room air   Pertinent labs: WBC 14.81 (neutrophilic predominance), Hgb 9.1, Platelet 223, BUN/Cr 44/1.4, Protein gap 6.4, bilirubin 5, AST//106, Alkphos 152, GI PCR negative   EKG   Imaging CT AP: , removal of percutaneous transhepatic cholecystostomy tube now with gallbladder distention and mild extrahepatic biliary ductal  dilatation. CBD measures 1.3 cm.Persistent pancreatic ductal dilatation within the body and tail with an  apparent 2.0 cm low-attenuation mass within the midline body at the site  of caliber change, concerning for pancreatic malignancy.   CT chest: Slightly increased mild right greater than left pleural effusions with subjacent atelectasis  Interventions/consults: 2g Cefepime, 4mg Morphine, 2.5L NS, ICU

## 2024-02-28 NOTE — CONSULT NOTE ADULT - SUBJECTIVE AND OBJECTIVE BOX
78M w PMH Crohn's, AFib/Flutter s/p DCCVs on amiodarone, remote ileocectomy and open appendectomy. Admitted (6/23) for SBO vs Crohns flare, s/p NGT decompression and s/p lap converted to open TRE, SBR x 3, left in discontinuity with abthera vac on (6/27), RTOR for ileocolic resection, small bowel anastomosis, and abdominal wall closure on (6/28), c/b fluid collection s/p IR aspiration of perihepatic fluid on (7/3), c/b wound dehiscence s/p RTOR exlap, washout, ileocolic resection with end ileostomy, blow hole colostomy, red rubber from ileostomy to small bowel anastomosis; vicryl bridging mesh on (7/5) transferred to SICU postoperatively for hemodynamic monitoring, with hospital course complicated by periods of severe ELVI and hyponatremia, which resolved but stepped back up for to SICU on (9/10) for acute AMS, intermittent hypoglycemia, AFib with RVR. Percutaneous Cholecystostomy placed on (9/11) for enlarged GB, PCT capped 10/5 and uncapped 10/25 for hyperbilirubinemia, Right brachial DVT, left basillic and cephalic superficial thrombus on duplex 11/2, CT 11/14 with ALDEN ground glass opacity of unclear etiology, completed empiric 7day cefepime course 11/22, and another course of 7day cefepime for PNA  (1/15-23), hyperbilirubinemia of unclear etiology, resolved candida glabrata fungemia, ELVI resolved, second episode of sinus pause--pacemaker placed (2/5). cystic duct open, Perc cony removed 2/8. Discharged home 2/12/24. Recently presented to ED w/ Hgb of 6.5 after routine lab work at home, s/p 2 unit prbcs (2/16) and again on 2/23, transfused an additional unit. Patient now presents after x1 day of periumbilical abdominal pain, followed by high output from ECF.     Patient reports feeling well until yesterday. He describes centralized abdominal pain, followed by nausea and vomiting this AM. Afterwards, he described high output from ECF. No additional bleeding recently from ECF site. Nurses reported approx 2L of output since 8am this morning. Patient reported feeling lethargic yesterday and today as well. Denies dizziness, lightheadedness, sob or cp.      In the ED, pt afebrile, tachycardic to low 100s, initially SBP in 90s, improved to 120s, satting well on RA. Given 2.5L IVF bolus. On exam, abdomen is soft, NTND. Midline ECF with minimal gas and green loose appearing output. Nonbloody. Ileostomy site with minimal yellow output. No rebound or guarding. Labs significant for WBC 14.8, Hgb 9.1, Cr 1.4 (1.34), T bili 5 (4.4), mild transaminitis, similar to prior. C diff negative. GI PCR neg. CTAP showing since 11/1/2023, removal of percutaneous transhepatic cholecystostomy tube now with gallbladder distention and mild extrahepatic biliary ductal dilatation. CBD measures 1.3 cm. Persistent pancreatic ductal dilatation within the body and tail with an apparent 2.0 cm low-attenuation mass within the midline body at the site of caliber change, concerning for pancreatic malignancy.There are also several scattered cystic lesions within the head and tail. Slightly increased mild right greater than left pleural effusions with subjacent passive atelectasis.    T(C): 36.4 (02-28-24 @ 14:39), Max: 36.4 (02-28-24 @ 14:39)  HR: 109 (02-28-24 @ 16:30) (109 - 113)  BP: 129/71 (02-28-24 @ 16:30) (94/62 - 129/71)  RR: 19 (02-28-24 @ 16:30) (18 - 19)  SpO2: 97% (02-28-24 @ 16:30) (94% - 97%)    Physical Exam  General: AAOx3, NAD, laying comfortably in bed  Cardio: S1,S2, No MRG  Pulm: Nonlabored breathing  Abdomen: abdomen is soft, NTND. Midline ECF with minimal gas and green loose appearing output. Nonbloody. Ileostomy site with minimal yellow output. No rebound or guarding.  Extremities: WWP, peripheral pulses appreciated      LABS:                        9.1    14.81 )-----------( 223      ( 28 Feb 2024 15:26 )             28.5     02-28    136  |  100  |  44<H>  ----------------------------<  106<H>  4.9   |  26  |  1.40<H>    Ca    8.6      28 Feb 2024 15:26    TPro  9.1<H>  /  Alb  2.7<L>  /  TBili  5.0<H>  /  DBili  x   /  AST  182<H>  /  ALT  106<H>  /  AlkPhos  152<H>  02-28    PT/INR - ( 28 Feb 2024 15:26 )   PT: 14.6 sec;   INR: 1.29          PTT - ( 28 Feb 2024 15:26 )  PTT:29.2 sec    CT Abdomen and Pelvis w/ Oral Cont and w/ IV Cont:   ACC: [id number] EXAM:  CT ABDOMEN AND PELVIS OC IC   ORDERED BY: EUSEBIO FARRELL     PROCEDURE DATE:  02/28/2024      INTERPRETATION:  CLINICAL INFORMATION: Nausea, vomiting, diarrhea in the   setting of complex surgical history severe    COMPARISON: CT abdomen and pelvis 11/1/2023    CONTRAST/COMPLICATIONS:  IV Contrast: Isovue 370  90 cc administered   10 cc discarded  Oral Contrast: Omnipaque 350  Complications: None reported at time of study completion    PROCEDURE:  CT of the Abdomen and Pelvis was performed.  Sagittal and coronal reformats were performed.    FINDINGS:  LOWER CHEST: Slightly increased mild right greater than left pleural   effusions with subjacent atelectasis. Few patchy nodular opacities right   lower lobe. Coronary artery calcification. Mild bilateral gynecomastia.   Left chest wall loop recorder.    LIVER: 2.2 cm posterior right probable cyst. Few additional hypodensities   too small to characterize.  BILE DUCTS: No intrahepatic biliary ductal dilatation. Mildly dilated   common bile duct measures 1.3 cm with smooth distal tapering  GALLBLADDER: The gallbladder is distended with mild pericholecystic   fluid. No radiopaque gallstones. A percutaneous transhepatic   cholecystostomy tube has been removed.  SPLEEN: Within normal limits.  PANCREAS: Diffuse atrophy with enlargement of the main pancreatic duct   with an apparent 2.0 cm low-attenuation lesion within the body (image   #56, series 3) at the site of caliber change. There are several cystic   lesions noted within the pancreatic head measuring up to 1.4 cm (image   #69, series 3) as well as within the tail.  ADRENALS: Within normal limits.  KIDNEYS/URETERS: Bilateral hypodensities measuring up to 3 cm within the   right kidney, likely simple cysts.    BLADDER: Within normal limits.  REPRODUCTIVE ORGANS: Prostate within normal limits.    BOWEL: Again diverting transverse colostomy and right lower quadrant   diverting ileostomy. Duodenal diverticulum. Oral contrast exits the   transverse colostomy to the abdominal surface. No pneumoperitoneum or   obstruction. No focal bowel wall thickening.  PERITONEUM: No ascites.  VESSELS: Atherosclerotic changes.  RETROPERITONEUM/LYMPH NODES: There is a 1.1 cm short axis lymph node   within the inferior aspect of the gastrohepatic ligament (image #46,   series 3).  ABDOMINAL WALL: Postsurgical changes of the midline anterior abdominal   wall including a right lower quadrant ileostomy and left para midline   transverse colostomy.. Again small to moderate size fat-containing left   inguinal hernia Again asymmetric fatty infiltration/atrophy left rectus   femoris.  BONES: Degenerative changes.      IMPRESSION:  Since 11/1/2023, removal of percutaneous transhepatic cholecystostomy   tube now with gallbladder distention and mild extrahepatic biliary ductal   dilatation. CBD measures 1.3 cm.    Persistent pancreatic ductal dilatation within the body and tail with an   apparent 2.0 cm low-attenuation mass within the midline body at the site   of caliber change, concerning for pancreatic malignancy. Further   evaluation with pancreas protocol CT or MRI versus endoscopic ultrasound   is recommended. There are also several scattered cystic lesions within   the head and tail.    Slightly increased mild right greater than left pleural effusions with   subjacent passive atelectasis.    --- End of Report ---    SALEEM MUÑOZ MD; Resident Radiologist  This document has been electronically signed.  MARY GAN MD; Attending Radiologist  This document has been electronically signed. Feb 28 2024  7:26PM (02-28-24 @ 17:46)
HPI:  78M w PMH Crohn's, AFib/Flutter s/p DCCVs on amiodarone, remote ileocectomy and open appendectomy. Admitted () for SBO vs Crohns flare, s/p NGT decompression and s/p lap converted to open TRE, SBR x 3, left in discontinuity with abthera vac on (), RTOR for ileocolic resection, small bowel anastomosis, and abdominal wall closure on (), c/b fluid collection s/p IR aspiration of perihepatic fluid on (7/3), c/b wound dehiscence s/p RTOR exlap, washout, ileocolic resection with end ileostomy, blow hole colostomy, red rubber from ileostomy to small bowel anastomosis; vicryl bridging mesh on () transferred to SICU postoperatively for hemodynamic monitoring, with hospital course complicated by periods of severe ELVI and hyponatremia, which resolved but stepped back up for to SICU on (9/10) for acute AMS, intermittent hypoglycemia, AFib with RVR. Percutaneous Cholecystostomy placed on () for enlarged GB, PCT capped 10/5 and uncapped 10/25 for hyperbilirubinemia, Right brachial DVT, left basillic and cephalic superficial thrombus on duplex , CT  with ALDEN ground glass opacity of unclear etiology, completed empiric 7day cefepime course , and another course of 7day cefepime for PNA  (1/15-23), hyperbilirubinemia of unclear etiology, resolved candida glabrata fungemia, ELVI resolved, second episode of sinus pause--pacemaker placed (). cystic duct open, Perc cony removed . Discharged home 24. Recently presented to ED twice for anemia requiring PRBC transfusion and dc. Returns today for generalized weakness, decreased PO intake, nbnb vomiting since today, nausea and increased stoma output     PAST MEDICAL & SURGICAL HISTORY:  Essential hypertension  Hypertension      Atrial fibrillation  s/p cardioversion  and   Pt. reports 4 DCCV  Now on Amiodarone 200mg PO bid and Eliquis 5mg PO bid  Last DCCV 4 yrs ago at [facility]      Crohn's disease  s/p partial resection of ileum      Hyperlipidemia      Hypothyroidism      History of depression  On Venlafaxine ER 150mg PO bid      Junctional rhythm      Bradycardia      H/O knee surgery      History of cataract surgery          Home Medications:  allopurinol 300 mg oral tablet: 1 tab(s) orally once a day (2023 16:39)  amiodarone 200 mg oral tablet: 1 orally once a day (2023 16:39)  metoprolol tartrate 50 mg oral tablet: 1 orally 2 times a day (2023 16:39)  rosuvastatin 5 mg oral tablet: 1 tab(s) orally once a day (2023 16:39)  venlafaxine 150 mg oral tablet, extended release: 1 tab(s) orally 2 times a day (2023 16:39)    MEDICATIONS  (STANDING):  lactated ringers Bolus 2000 milliLiter(s) IV Bolus once    MEDICATIONS  (PRN):      Allergies  penicillin (Angioedema)    PHYSICAL EXAM:  General: AAOx3, NAD, laying comfortably in bed, moist mucous membranes but dry skin turgor   Cardio: S1,S2, No MRG  Pulm: Nonlabored breathing  Abdomen: soft, NTND. Minimal oozing noted on right side of fistula  Extremities: WWP, peripheral pulses appreciated  Skin: jaundiced    LABS:                          9.1    14.81 )-----------( 223      ( 2024 15:26 )             28.5                                                   136  |  100  |  44<H>  ----------------------------<  106<H>  4.9   |  26  |  1.40<H>    Ca    8.6      2024 15:26    TPro  9.1<H>  /  Alb  2.7<L>  /  TBili  5.0<H>  /  DBili  x   /  AST  182<H>  /  ALT  106<H>  /  AlkPhos  152<H>        PT/INR - ( 2024 15:26 )   PT: 14.6 sec;   INR: 1.29          PTT - ( 2024 15:26 )  PTT:29.2 sec                                       Urinalysis Basic - ( 2024 17:38 )    Color: Dark Yellow / Appearance: Cloudy / S.022 / pH: x  Gluc: x / Ketone: Negative mg/dL  / Bili: Moderate / Urobili: 1.0 mg/dL   Blood: x / Protein: Trace mg/dL / Nitrite: Negative   Leuk Esterase: Small / RBC: 13 /HPF / WBC 0 /HPF   Sq Epi: x / Non Sq Epi: 3 /HPF / Bacteria: Negative /HPF                                                  LIVER FUNCTIONS - ( 2024 15:26 )  Alb: 2.7 g/dL / Pro: 9.1 g/dL / ALK PHOS: 152 U/L / ALT: 106 U/L / AST: 182 U/L / GGT: x

## 2024-02-28 NOTE — H&P ADULT - NSHPLABSRESULTS_GEN_ALL_CORE
LABS:  cret                        9.1    14.81 )-----------( 223      ( 28 Feb 2024 15:26 )             28.5     02-28    136  |  100  |  44<H>  ----------------------------<  106<H>  4.9   |  26  |  1.40<H>    Ca    8.6      28 Feb 2024 15:26    TPro  9.1<H>  /  Alb  2.7<L>  /  TBili  5.0<H>  /  DBili  x   /  AST  182<H>  /  ALT  106<H>  /  AlkPhos  152<H>  02-28    PT/INR - ( 28 Feb 2024 15:26 )   PT: 14.6 sec;   INR: 1.29          PTT - ( 28 Feb 2024 15:26 )  PTT:29.2 sec

## 2024-02-29 NOTE — PROGRESS NOTE ADULT - SUBJECTIVE AND OBJECTIVE BOX
******INCOMPLETE******    Patient is a 78y old  Male who presents with a chief complaint of sepsis (2024 07:38)    OVERNIGHT EVENTS/INTERVAL HPI:    REVIEW OF SYSTEMS:  All other review of systems is negative unless indicated above.    OBJECTIVE:  T(C): 37 (24 @ 05:09), Max: 37.1 (24 @ 22:45)  HR: 109 (24 @ 09:00) (109 - 116)  BP: 144/65 (24 @ 09:00) (94/62 - 162/74)  RR: 19 (24 @ 09:00) (16 - 20)  SpO2: 92% (24 @ 09:00) (92% - 97%)  Daily Height in cm: 187.96 (2024 14:39)    Daily Weight in k.8 (2024 09:00)    Physical Exam:  General: in no acute distress  Eyes: EOMI intact bilaterally. Anicteric sclerae, moist conjunctivae  HENT: Moist mucous membranes  Neck: Trachea midline, supple  Lungs: CTA B/L. No wheezes, rales, or rhonchi  Cardiovascular: RRR. No murmurs, rubs, or gallops  Abdomen: Soft, non-tender non-distended; No rebound or guarding  Extremities: WWP, No clubbing, cyanosis or edema  MSK: No midline bony tenderness. No CVA tenderness bilaterally  Neurological: Alert and oriented x3  Skin: Warm and dry. No obvious rash     Medications:  MEDICATIONS  (STANDING):  cefepime   IVPB 2000 milliGRAM(s) IV Intermittent every 12 hours  chlorhexidine 2% Cloths 1 Application(s) Topical <User Schedule>  enoxaparin Injectable 40 milliGRAM(s) SubCutaneous every 24 hours  levothyroxine 50 MICROGram(s) Oral daily  venlafaxine XR. 150 milliGRAM(s) Oral two times a day    MEDICATIONS  (PRN):  zolpidem 10 milliGRAM(s) Oral at bedtime PRN Insomnia      Labs:                        8.2    10.89 )-----------( 161      ( 2024 05:30 )             26.3         138  |  106  |  31<H>  ----------------------------<  75  4.2   |  25  |  1.45<H>    Ca    8.6      2024 05:30  Phos  3.9       Mg     1.9         TPro  7.7  /  Alb  2.1<L>  /  TBili  4.8<H>  /  DBili  3.3<H>  /  AST  147<H>  /  ALT  93<H>  /  AlkPhos  127<H>      PT/INR - ( 2024 05:30 )   PT: 14.8 sec;   INR: 1.31          PTT - ( 2024 05:30 )  PTT:34.6 sec  Urinalysis Basic - ( 2024 05:30 )    Color: x / Appearance: x / SG: x / pH: x  Gluc: 75 mg/dL / Ketone: x  / Bili: x / Urobili: x   Blood: x / Protein: x / Nitrite: x   Leuk Esterase: x / RBC: x / WBC x   Sq Epi: x / Non Sq Epi: x / Bacteria: x      SARS-CoV-2: NotDetec (15 Sukhdev 2024 11:15)  SARS-CoV-2: NotDetec (2023 00:09)  SARS-CoV-2: NotDetec (15 Nov 2023 12:11)  COVID-19 PCR: NotDetec (2023 20:59)  SARS-CoV-2: NotDetec (31 Oct 2023 09:16)  SARS-CoV-2: NotDetec (10 Sep 2023 18:17)      Radiology: Reviewed OVERNIGHT EVENTS/INTERVAL HPI: o/n ana maria. This morning, pt seen. Reports his nausea and abdominal pain has improved. He continues to have green and mostly liquid output. Denies chest pain.    REVIEW OF SYSTEMS:  All other review of systems is negative unless indicated above.    OBJECTIVE:  T(C): 37 (24 @ 05:09), Max: 37.1 (24 @ 22:45)  HR: 109 (24 @ 09:00) (109 - 116)  BP: 144/65 (24 @ 09:00) (94/62 - 162/74)  RR: 19 (24 @ 09:00) (16 - 20)  SpO2: 92% (24 @ 09:00) (92% - 97%)  Daily Height in cm: 187.96 (2024 14:39)    Daily Weight in k.8 (2024 09:00)    Physical Exam:  General: in no acute distress  Eyes: EOMI intact bilaterally.   HENT: Moist mucous membranes  Lungs: CTA B/L.   Cardiovascular: RRR.   Abdomen: not tender, Midline ECF bag draining green liquid, R ileostomy site with yellow/brown output  Extremities: WWP, trace pitting edema b/l to mid shin  MSK: 5/5  strength b/l  Neurological: Alert and oriented x3  Skin: jaundiced    Medications:  MEDICATIONS  (STANDING):  cefepime   IVPB 2000 milliGRAM(s) IV Intermittent every 12 hours  chlorhexidine 2% Cloths 1 Application(s) Topical <User Schedule>  enoxaparin Injectable 40 milliGRAM(s) SubCutaneous every 24 hours  levothyroxine 50 MICROGram(s) Oral daily  venlafaxine XR. 150 milliGRAM(s) Oral two times a day    MEDICATIONS  (PRN):  zolpidem 10 milliGRAM(s) Oral at bedtime PRN Insomnia      Labs:                        8.2    10.89 )-----------( 161      ( 2024 05:30 )             26.3         138  |  106  |  31<H>  ----------------------------<  75  4.2   |  25  |  1.45<H>    Ca    8.6      2024 05:30  Phos  3.9       Mg     1.9         TPro  7.7  /  Alb  2.1<L>  /  TBili  4.8<H>  /  DBili  3.3<H>  /  AST  147<H>  /  ALT  93<H>  /  AlkPhos  127<H>      PT/INR - ( 2024 05:30 )   PT: 14.8 sec;   INR: 1.31          PTT - ( 2024 05:30 )  PTT:34.6 sec  Urinalysis Basic - ( 2024 05:30 )    Color: x / Appearance: x / SG: x / pH: x  Gluc: 75 mg/dL / Ketone: x  / Bili: x / Urobili: x   Blood: x / Protein: x / Nitrite: x   Leuk Esterase: x / RBC: x / WBC x   Sq Epi: x / Non Sq Epi: x / Bacteria: x      SARS-CoV-2: NotDetec (15 Sukhdev 2024 11:15)  SARS-CoV-2: NotDetec (2023 00:09)  SARS-CoV-2: NotDetec (15 Nov 2023 12:11)  COVID-19 PCR: NotDetec (2023 20:59)  SARS-CoV-2: NotDetec (31 Oct 2023 09:16)  SARS-CoV-2: NotDetec (10 Sep 2023 18:17)      Radiology: Reviewed

## 2024-02-29 NOTE — PROGRESS NOTE ADULT - SUBJECTIVE AND OBJECTIVE BOX
SUBJECTIVE: Patient seen and examined at bedside. Patient states that his abdominal pain has improved during his admission. He denies nausea.      cefepime   IVPB 2000 milliGRAM(s) IV Intermittent every 12 hours  enoxaparin Injectable 40 milliGRAM(s) SubCutaneous every 24 hours      Vital Signs Last 24 Hrs  T(C): 36.4 (29 Feb 2024 10:50), Max: 37.1 (28 Feb 2024 22:45)  T(F): 97.6 (29 Feb 2024 10:50), Max: 98.7 (28 Feb 2024 22:45)  HR: 110 (29 Feb 2024 12:00) (109 - 116)  BP: 127/64 (29 Feb 2024 12:00) (94/62 - 162/74)  BP(mean): 90 (29 Feb 2024 12:00) (87 - 97)  RR: 20 (29 Feb 2024 12:00) (16 - 20)  SpO2: 95% (29 Feb 2024 12:00) (92% - 97%)    Parameters below as of 29 Feb 2024 05:07  Patient On (Oxygen Delivery Method): room air      I&O's Detail    28 Feb 2024 07:01  -  29 Feb 2024 07:00  --------------------------------------------------------  IN:  Total IN: 0 mL    OUT:    Drain (mL): 750 mL    Voided (mL): 600 mL  Total OUT: 1350 mL    Total NET: -1350 mL      29 Feb 2024 07:01  -  29 Feb 2024 12:44  --------------------------------------------------------  IN:  Total IN: 0 mL    OUT:    Drain (mL): 400 mL    Voided (mL): 300 mL  Total OUT: 700 mL    Total NET: -700 mL          General: NAD, resting comfortably in bed  C/V: NSR  Pulm: Nonlabored breathing, no respiratory distress  Abd: soft, NT/ND, ECF with light bilious output no teetee blood  Extrem: WWP, no edema, SCDs in place        LABS:                        8.2    10.89 )-----------( 161      ( 29 Feb 2024 05:30 )             26.3     02-29    138  |  106  |  31<H>  ----------------------------<  75  4.2   |  25  |  1.45<H>    Ca    8.6      29 Feb 2024 05:30  Phos  3.9     02-29  Mg     1.9     02-29    TPro  7.7  /  Alb  2.1<L>  /  TBili  4.8<H>  /  DBili  3.3<H>  /  AST  147<H>  /  ALT  93<H>  /  AlkPhos  127<H>  02-29    PT/INR - ( 29 Feb 2024 05:30 )   PT: 14.8 sec;   INR: 1.31          PTT - ( 29 Feb 2024 05:30 )  PTT:34.6 sec  Urinalysis Basic - ( 29 Feb 2024 05:30 )    Color: x / Appearance: x / SG: x / pH: x  Gluc: 75 mg/dL / Ketone: x  / Bili: x / Urobili: x   Blood: x / Protein: x / Nitrite: x   Leuk Esterase: x / RBC: x / WBC x   Sq Epi: x / Non Sq Epi: x / Bacteria: x        RADIOLOGY & ADDITIONAL STUDIES:

## 2024-02-29 NOTE — PROGRESS NOTE ADULT - SUBJECTIVE AND OBJECTIVE BOX
new abd pain  CT with dilated CBD  Increased LFTs  ?Gattax  ? biliary disease  General surgery consulted  ? GI consult  Bleeding from abd wall  ? vascular cause  Vascular surgery called  Wound care  OOB  Hold gattax  AVSS  abd sofT  ET  PT  Will follow

## 2024-02-29 NOTE — PROGRESS NOTE ADULT - PROBLEM SELECTOR PLAN 3
RUQ US: Distended gallbladder with wall thickening and sludge. No Capellan's sign. Dilated common bile duct, measuring 1.0 cm.  - Colorectal surg following  - NPO for now if plan for procedure RUQ US: Distended gallbladder with wall thickening and sludge. No Capellan's sign. Dilated common bile duct, measuring 1.0 cm.  - Colorectal surg following  - MRCP ordered RUQ US: Distended gallbladder with wall thickening and sludge. No Capellan's sign. Dilated common bile duct, measuring 1.0 cm.  Bilirubin elevated to 5-->4.8 (previously admission 4-4.5)  - Colorectal surg following  - MRCP ordered

## 2024-02-29 NOTE — PROGRESS NOTE ADULT - PROBLEM SELECTOR PLAN 5
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

## 2024-02-29 NOTE — PROGRESS NOTE ADULT - PROBLEM SELECTOR PLAN 1
Meeting SIRS criteria with -110s with leukocytosis with unknown source. Extensive surgery course as mentioned above with EC fistula c/b biliary cx revealing Carbapenem res Psa and Ecoli.   CT A/P revealed mildly enlarged GB with mild pericholecystic fluid. S/p cefepime in ED. Abdominal exam benign   RUQ US: Distended gallbladder with wall thickening and sludge. No Capellan's sign. Dilated common bile duct, measuring 1.0 cm.  -c/w cefepime 2g q12  - Surgical teams following, considering perc cony tomorrow Meeting SIRS criteria with -110s with leukocytosis. Extensive surgery course as mentioned above with EC fistula c/b biliary cx revealing Carbapenem res Psa and Ecoli.   CT A/P revealed mildly enlarged GB with mild pericholecystic fluid. S/p cefepime in ED. Abdominal exam benign   RUQ US: Distended gallbladder with wall thickening and sludge. No Capellan's sign. Dilated common bile duct, measuring 1.0 cm.  -c/w cefepime 2g q12  - Surgical teams following, considering perc cony tomorrow

## 2024-02-29 NOTE — PROGRESS NOTE ADULT - PROBLEM SELECTOR PLAN 6
Diagnosis of AFib/Flutter s/p DCCVs.home med: amiodarone 200 mg QD, lopressor 50 mg BID PO, currently 100-110s   -Continue amiodarone   -hold  BB for now given sepsis. Resume as appropriate Diagnosis of AFib/Flutter s/p DCCVs.home med: amiodarone 200 mg QD, lopressor 50 mg BID PO, currently 100-110s   -hold  BB for now given sepsis. Resume as appropriate Diagnosis of AFib/Flutter s/p DCCVs.home med, lopressor 50 mg BID PO, currently 100-110s   -resuming lopressor 50mg BID

## 2024-03-01 NOTE — PROGRESS NOTE ADULT - PROBLEM SELECTOR PLAN 1
Meeting SIRS criteria with -110s with leukocytosis. Extensive surgery course with EC fistula c/b biliary cx revealing Carbapenem res Psa and Ecoli in 12/23.   CT A/P revealed mildly enlarged GB with mild pericholecystic fluid. S/p cefepime in ED. Abdominal exam benign   RUQ US: Distended gallbladder with wall thickening and sludge. No Capellan's sign. Dilated common bile duct, measuring 1.0 cm.  WBC downtrending, pt afebrile.   -c/w cefepime 2g q12

## 2024-03-01 NOTE — PROGRESS NOTE ADULT - PROBLEM SELECTOR PLAN 6
Diagnosis of AFib/Flutter s/p DCCVs.home med, lopressor 50 mg BID PO, currently 100-110s   -resuming lopressor 50mg BID

## 2024-03-01 NOTE — DIETITIAN INITIAL EVALUATION ADULT - NSICDXPASTMEDICALHX_GEN_ALL_CORE_FT
PAST MEDICAL HISTORY:  Atrial fibrillation s/p cardioversion 2010 and 2014  Pt. reports 4 DCCV  Now on Amiodarone 200mg PO bid and Eliquis 5mg PO bid  Last DCCV 4 yrs ago at [facility]    Bradycardia     Crohn's disease s/p partial resection of ileum    Essential hypertension Hypertension    History of depression On Venlafaxine ER 150mg PO bid    Hyperlipidemia     Hypothyroidism     Junctional rhythm

## 2024-03-01 NOTE — DIETITIAN INITIAL EVALUATION ADULT - NS FNS DIET ORDER
Diet, Regular:   Supplement Feeding Modality:  Oral  Ensure Max Cans or Servings Per Day:  1       Frequency:  Three Times a day (03-01-24 @ 16:39)

## 2024-03-01 NOTE — DIETITIAN INITIAL EVALUATION ADULT - PROBLEM SELECTOR PLAN 3
Cr 1.4 on admission. Baseline 1.1-1.2. suspect prerenal etiology given volume status and increased ostomy output  -Urine lytes   -Fluid resuscitation   -Trend Cr, Avoid nephrotoxic meds   -Strict I/O's and monitoring of ostomy output

## 2024-03-01 NOTE — DIETITIAN INITIAL EVALUATION ADULT - PERTINENT MEDS FT
MEDICATIONS  (STANDING):  cefepime   IVPB 2000 milliGRAM(s) IV Intermittent every 12 hours  chlorhexidine 2% Cloths 1 Application(s) Topical <User Schedule>  enoxaparin Injectable 40 milliGRAM(s) SubCutaneous every 24 hours  levothyroxine 50 MICROGram(s) Oral daily  lipid, fat emulsion (Fish Oil and Plant Based) 20% Infusion 0.54 Gm/kG/Day (20.83 mL/Hr) IV Continuous <Continuous>  metoprolol tartrate 50 milliGRAM(s) Oral every 12 hours  Parenteral Nutrition - Adult 1 Each TPN Continuous <Continuous>  venlafaxine XR. 150 milliGRAM(s) Oral two times a day    MEDICATIONS  (PRN):  zolpidem 10 milliGRAM(s) Oral at bedtime PRN Insomnia

## 2024-03-01 NOTE — DIETITIAN INITIAL EVALUATION ADULT - ETIOLOGY
related to extensive GI history, insufficient bowel, high output fistula  related to physiological demands for nutrient

## 2024-03-01 NOTE — PROGRESS NOTE ADULT - PROBLEM SELECTOR PLAN 5
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

## 2024-03-01 NOTE — DIETITIAN INITIAL EVALUATION ADULT - PROBLEM SELECTOR PLAN 1
Meeting SIRS criteria with -110s with leukocytosis with unknown source. Extensive surgery course as mentioned above with EC fistula c/b biliary cx revealing Carbapenem res Psa and Ecoli.   CT A/P revealed mildly enlarged GB with mild pericholecystic fluid. S/p cefepime in ED. Abdominal exam benign   -RUQ US  -cw cefepime renally dosing. Will require ID approval   - start 2L LR given hypovolemia for resucitation   -obtain wound care consult in AM  - Surgical teams following, considering perc cony tomorrow  - formal ID consult in am

## 2024-03-01 NOTE — PROGRESS NOTE ADULT - SUBJECTIVE AND OBJECTIVE BOX
Labs OK  WBC normalized  H&H stable  VSS  Afebrile  formed outputs from fistula  Await Hepato-Biliary W/U

## 2024-03-01 NOTE — PROGRESS NOTE ADULT - PROBLEM SELECTOR PLAN 3
RUQ US: Distended gallbladder with wall thickening and sludge. No Capellan's sign. Dilated common bile duct, measuring 1.0 cm.  Bilirubin elevated to 5-->4.8 (previously admission 4-4.5)  - Colorectal surg following  - MRCP ordered

## 2024-03-01 NOTE — DIETITIAN INITIAL EVALUATION ADULT - PROBLEM SELECTOR PLAN 4
Diagnosis of AFib/Flutter s/p DCCVs.home med: amiodarone 200 mg QD, lopressor 50 mg BID PO, currently 100-110s   -Continue amiodarone   -hold  BB for now given sepsis. Resume as appropriate   -f/u TSH level

## 2024-03-01 NOTE — PROGRESS NOTE ADULT - SUBJECTIVE AND OBJECTIVE BOX
SUBJECTIVE: Patient seen and examined at bedside.    cefepime   IVPB 2000 milliGRAM(s) IV Intermittent every 12 hours  enoxaparin Injectable 40 milliGRAM(s) SubCutaneous every 24 hours  metoprolol tartrate 50 milliGRAM(s) Oral every 12 hours      Vital Signs Last 24 Hrs  T(C): 35.9 (01 Mar 2024 14:36), Max: 37 (01 Mar 2024 00:30)  T(F): 96.7 (01 Mar 2024 14:36), Max: 98.6 (01 Mar 2024 00:30)  HR: 106 (01 Mar 2024 13:23) (104 - 112)  BP: 137/63 (01 Mar 2024 13:23) (107/59 - 139/65)  BP(mean): 91 (01 Mar 2024 13:23) (74 - 93)  RR: 14 (01 Mar 2024 13:23) (14 - 22)  SpO2: 95% (01 Mar 2024 13:23) (92% - 98%)    Parameters below as of 01 Mar 2024 13:23  Patient On (Oxygen Delivery Method): room air      I&O's Detail    29 Feb 2024 07:01  -  01 Mar 2024 07:00  --------------------------------------------------------  IN:  Total IN: 0 mL    OUT:    Drain (mL): 0 mL    Drain (mL): 2200 mL    Voided (mL): 1020 mL  Total OUT: 3220 mL    Total NET: -3220 mL      01 Mar 2024 07:01  -  01 Mar 2024 14:48  --------------------------------------------------------  IN:    Oral Fluid: 240 mL  Total IN: 240 mL    OUT:    Drain (mL): 0 mL    Drain (mL): 350 mL    Voided (mL): 225 mL  Total OUT: 575 mL    Total NET: -335 mL          General: NAD, resting comfortably in bed  C/V: NSR  Pulm: Nonlabored breathing, no respiratory distress  Abd: soft, NT/ND. ECF with bilious output, no stigmata of bleeding.  Extrem: WWP, no edema, SCDs in place      LABS:                        8.3    8.13  )-----------( 166      ( 01 Mar 2024 07:23 )             27.6     03-01    135  |  104  |  28<H>  ----------------------------<  68<L>  4.5   |  25  |  1.46<H>    Ca    8.8      01 Mar 2024 07:23  Phos  3.5     03-01  Mg     1.9     03-01    TPro  8.2  /  Alb  2.3<L>  /  TBili  5.0<H>  /  DBili  x   /  AST  144<H>  /  ALT  90<H>  /  AlkPhos  140<H>  03-01    PT/INR - ( 29 Feb 2024 05:30 )   PT: 14.8 sec;   INR: 1.31          PTT - ( 29 Feb 2024 05:30 )  PTT:34.6 sec  Urinalysis Basic - ( 01 Mar 2024 07:23 )    Color: x / Appearance: x / SG: x / pH: x  Gluc: 68 mg/dL / Ketone: x  / Bili: x / Urobili: x   Blood: x / Protein: x / Nitrite: x   Leuk Esterase: x / RBC: x / WBC x   Sq Epi: x / Non Sq Epi: x / Bacteria: x

## 2024-03-01 NOTE — DIETITIAN INITIAL EVALUATION ADULT - OTHER INFO
78M w PMH Crohn's, AFib/Flutter s/p DCCVs on amiodarone, remote ileocectomy and open appendectomy. Admitted (6/23) for SBO vs Crohns flare, s/p NGT decompression and s/p lap converted to open TRE, SBR x 3, left in discontinuity with abthera vac on (6/27), RTOR for ileocolic resection, small bowel anastomosis, and abdominal wall closure on (6/28), c/b fluid collection s/p IR aspiration of perihepatic fluid on (7/3), c/b wound dehiscence s/p RTOR exlap, washout, ileocolic resection with end ileostomy, blow hole colostomy, red rubber from ileostomy to small bowel anastomosis; vicryl bridging mesh on (7/5) and was kept in the SICU for multiple different ailments as well as for aggressive wound care. Patient was discharged home 2/12/24. Returned 2/16 and 2/23 for oozing from granulation tissue surrounding ECF. Admitted to medicine with x1 day of centralized abdominal pain, nausea, vomiting and high output from ECF.     Chart reviewed. Pt seen with wife at bedside on 7 LA, on room air. Pt well known to this service from previous admission. Currently ordered for PO diet with TPN as primary means to nutrition as bowel length <120cm without colon in continuity & high output intestinal fistula [insufficient bowel to maintain/restore nutrition status through PO diet per ASPEN]. 2.2L output via Eakins pouch x 24hrs. Pt endorses n/v/d for <24hrs period, denies exacerbating or relieving foods. Reported good PO intake PTA. Unsure of recent weight changes [no significant changes per chart review]. Gattex initiated 1/24 to increase absorptive capability- plan to monitor weights, outputs, wound healing, labs for absorption & adjust TPN provision prn to prevent over/underfeeding. TPN currently providing ~80% estimated kcal & protein needs, providing over 12 hours with goal to improve liver function. LFTs with slight increase since last admission. Continue to monitor & adjust TPN recs based on substrate requirements and GIR. Previously providing zinc on MWF schedule given previous low serum values, continue to monitor monthly [last checked 23 Jan]. Diet education provided to pt on current diet, GI s/s & weight monitoring. Pt may benefit from outpatient nutrition services, information proved. Labs: BUN 28 <H>, Creat 1.46 <H> [consider additional fluids], total bilirubin 5.0 <H>, Alk phos 140 <H>, /ALT 90 <H>. Meds: creon, synthroid [administer 30-60 minutes before food; separate at least 4hrs from calcium or iron-containing products or bile acid sequestrants]. RDN will continue to monitor, reassess, and intervene as appropriate.     Pain: no pain/discomfort noted  Skin: midline ECF bag [draining green liquid], R ileostomy site. Rudy score 16  GI: formed outputs from fistula

## 2024-03-01 NOTE — DIETITIAN INITIAL EVALUATION ADULT - SIGNS/SYMPTOMS
as evidenced by PN required to meet nutrition needs as evidenced by post-op wound healing, high output fistula

## 2024-03-01 NOTE — DIETITIAN INITIAL EVALUATION ADULT - PROBLEM SELECTOR PLAN 2
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

## 2024-03-01 NOTE — DIETITIAN INITIAL EVALUATION ADULT - ADD RECOMMEND
1. c/w TPN via PICC, 1.6L over 12 hrs - 250g Dex, 112g protein, 50g SMOF lipids; provides 1798 kcals, 112g protein, GIR 3.66 mg/kg/min [20.8 kcals/kg, 15.6 non-protein kcals/kg, 1.3g protein/kg IBW]  - provides ~83% low end kcal needs & 86.4% low end of protein needs  - fluids & lytes per team  - MVI, thiamine, vit C in bag, increase selenium as able  - recheck serum zinc, consider providing zinc in bag 3x per week   - copper & selenium in bag-- recheck serum levels now & monthly thereafter  - close monitoring of weights, fistula outputs, labs, wound healing, urine outputs & consider UUN & fecal fat studies to monitor changes in PO absorption --- adjust substrates in TPN bag as indicated to prevent over/underfeeding --> keep GIR <4-5 mg/kg/min  2. c/w Regular diet as medically feasible to allow for more menu options  - c/w Ensure Max daily [150 kcals, 30g protein each] as amenable  - protein foods encouraged  3. Weekly lipid panel while on TPN   - hold/decrease lipids if TG >400  - continue to trend LFTs  4. Recommend Vit D supplementation, recheck levels every 4 weeks [last 23 Jan]  5. Recommend weekly weights for trending/ensuring adequacy of nutrition provision  - if large gain or loss, consider adjusting TPN provision  6. Hydration per team  - risk for dehydration with high outputs, additional fluids prn  7. Monitor BMP/Mg/Phos, POC BG while on TPN  - monitor & replenish lytes outside TPN bag prn  8. Continue close monitoring of clinical course & adjust recommendations prn  9. Ongoing diet education prn

## 2024-03-01 NOTE — PROGRESS NOTE ADULT - SUBJECTIVE AND OBJECTIVE BOX
******INCOMPLETE******    Patient is a 78y old  Male who presents with a chief complaint of sepsis (01 Mar 2024 07:01)    OVERNIGHT EVENTS/INTERVAL HPI:    REVIEW OF SYSTEMS:  All other review of systems is negative unless indicated above.    OBJECTIVE:  T(C): 36.7 (24 @ 05:05), Max: 37 (24 @ 00:30)  HR: 106 (24 @ 04:01) (106 - 112)  BP: 116/57 (24 @ 04:01) (107/59 - 144/65)  RR: 18 (24 @ 04:01) (18 - 22)  SpO2: 93% (24 @ 04:01) (92% - 98%)  Daily     Daily Weight in k.8 (2024 09:00)    Physical Exam:  General: in no acute distress  Eyes: EOMI intact bilaterally. Anicteric sclerae, moist conjunctivae  HENT: Moist mucous membranes  Neck: Trachea midline, supple  Lungs: CTA B/L. No wheezes, rales, or rhonchi  Cardiovascular: RRR. No murmurs, rubs, or gallops  Abdomen: Soft, non-tender non-distended; No rebound or guarding  Extremities: WWP, No clubbing, cyanosis or edema  MSK: No midline bony tenderness. No CVA tenderness bilaterally  Neurological: Alert and oriented x3  Skin: Warm and dry. No obvious rash     Medications:  MEDICATIONS  (STANDING):  cefepime   IVPB 2000 milliGRAM(s) IV Intermittent every 12 hours  chlorhexidine 2% Cloths 1 Application(s) Topical <User Schedule>  enoxaparin Injectable 40 milliGRAM(s) SubCutaneous every 24 hours  levothyroxine 50 MICROGram(s) Oral daily  lipid, fat emulsion (Fish Oil and Plant Based) 20% Infusion 0.54 Gm/kG/Day (20.83 mL/Hr) IV Continuous <Continuous>  metoprolol tartrate 50 milliGRAM(s) Oral every 12 hours  Parenteral Nutrition - Adult 1 Each TPN Continuous <Continuous>  venlafaxine XR. 150 milliGRAM(s) Oral two times a day    MEDICATIONS  (PRN):  zolpidem 10 milliGRAM(s) Oral at bedtime PRN Insomnia      Labs:                        8.3    8.13  )-----------( 166      ( 01 Mar 2024 07:23 )             27.6     03    135  |  104  |  28<H>  ----------------------------<  68<L>  4.5   |  25  |  1.46<H>    Ca    8.8      01 Mar 2024 07:23  Phos  3.5       Mg     1.9         TPro  8.2  /  Alb  2.3<L>  /  TBili  5.0<H>  /  DBili  x   /  AST  144<H>  /  ALT  90<H>  /  AlkPhos  140<H>  03    PT/INR - ( 2024 05:30 )   PT: 14.8 sec;   INR: 1.31          PTT - ( 2024 05:30 )  PTT:34.6 sec  Urinalysis Basic - ( 01 Mar 2024 07:23 )    Color: x / Appearance: x / SG: x / pH: x  Gluc: 68 mg/dL / Ketone: x  / Bili: x / Urobili: x   Blood: x / Protein: x / Nitrite: x   Leuk Esterase: x / RBC: x / WBC x   Sq Epi: x / Non Sq Epi: x / Bacteria: x      SARS-CoV-2: NotDetec (15 Sukhdev 2024 11:15)  SARS-CoV-2: NotDetec (2023 00:09)  SARS-CoV-2: NotDetec (15 Nov 2023 12:11)  COVID-19 PCR: NotDetec (2023 20:59)  SARS-CoV-2: NotDetec (31 Oct 2023 09:16)  SARS-CoV-2: NotDetec (10 Sep 2023 18:17)      Radiology: Reviewed OVERNIGHT EVENTS/INTERVAL HPI: o/n approx 30 seconds of NSVT, pt sleeping through event. Mg 1g given. EKG nonischemic. This morning, pt denies any chest pain or sob. States he feels better and is able to eat. Denies nausea, vomiting, abd pain. Reports increased output for ECF fistula but more solid than liquid output.     REVIEW OF SYSTEMS:  All other review of systems is negative unless indicated above.    OBJECTIVE:  T(C): 36.7 (24 @ 05:05), Max: 37 (24 @ 00:30)  HR: 106 (24 @ 04:01) (106 - 112)  BP: 116/57 (24 @ 04:01) (107/59 - 144/65)  RR: 18 (24 @ 04:01) (18 - 22)  SpO2: 93% (24 @ 04:01) (92% - 98%)  Daily     Daily Weight in k.8 (2024 09:00)    Physical Exam:  General: in no acute distress  Eyes: EOMI intact bilaterally.   HENT: Moist mucous membranes  Lungs: CTA B/L.   Cardiovascular: RRR.   Abdomen: Non tender, Midline ECF bag draining green liquid, R ileostomy site with yellow/brown output  Extremities: WWP  MSK: equal  strength b/l  Neurological: Alert and oriented x3  Skin: jaundiced    Medications:  MEDICATIONS  (STANDING):  cefepime   IVPB 2000 milliGRAM(s) IV Intermittent every 12 hours  chlorhexidine 2% Cloths 1 Application(s) Topical <User Schedule>  enoxaparin Injectable 40 milliGRAM(s) SubCutaneous every 24 hours  levothyroxine 50 MICROGram(s) Oral daily  lipid, fat emulsion (Fish Oil and Plant Based) 20% Infusion 0.54 Gm/kG/Day (20.83 mL/Hr) IV Continuous <Continuous>  metoprolol tartrate 50 milliGRAM(s) Oral every 12 hours  Parenteral Nutrition - Adult 1 Each TPN Continuous <Continuous>  venlafaxine XR. 150 milliGRAM(s) Oral two times a day    MEDICATIONS  (PRN):  zolpidem 10 milliGRAM(s) Oral at bedtime PRN Insomnia      Labs:                        8.3    8.13  )-----------( 166      ( 01 Mar 2024 07:23 )             27.6     03-    135  |  104  |  28<H>  ----------------------------<  68<L>  4.5   |  25  |  1.46<H>    Ca    8.8      01 Mar 2024 07:23  Phos  3.5     03-  Mg     1.9         TPro  8.2  /  Alb  2.3<L>  /  TBili  5.0<H>  /  DBili  x   /  AST  144<H>  /  ALT  90<H>  /  AlkPhos  140<H>  03    PT/INR - ( 2024 05:30 )   PT: 14.8 sec;   INR: 1.31          PTT - ( 2024 05:30 )  PTT:34.6 sec  Urinalysis Basic - ( 01 Mar 2024 07:23 )    Color: x / Appearance: x / SG: x / pH: x  Gluc: 68 mg/dL / Ketone: x  / Bili: x / Urobili: x   Blood: x / Protein: x / Nitrite: x   Leuk Esterase: x / RBC: x / WBC x   Sq Epi: x / Non Sq Epi: x / Bacteria: x      SARS-CoV-2: NotDetec (15 Sukhdev 2024 11:15)  SARS-CoV-2: NotDetec (2023 00:09)  SARS-CoV-2: NotDetec (15 Nov 2023 12:11)  COVID-19 PCR: NotDetec (2023 20:59)  SARS-CoV-2: NotDetec (31 Oct 2023 09:16)  SARS-CoV-2: NotDetec (10 Sep 2023 18:17)      Radiology: Reviewed **Hospital Course**   78M w PMH Crohn's, AFib/Flutter s/p DCCVs on amiodarone, remote ileocectomy, prolonged hospital course with extensive abdominal surgeries now s/p midline ECF and ileostomy. Admitted for abdominal pain and increased ECF output. Concern for sepsis with CBD dilatation. Pt started on cefepime (-, with downtrending WBC. MRCP on 3/1 with evidence of distended pancreatic duct w/ 2 adjacent subcentimeter cystic lesions. TPN started on 3/1. Colorectal surg following.    OVERNIGHT EVENTS/INTERVAL HPI: o/n approx 30 seconds of NSVT, pt sleeping through event. Mg 1g given. EKG nonischemic. This morning, pt denies any chest pain or sob. States he feels better and is able to eat. Denies nausea, vomiting, abd pain. Reports increased output for ECF fistula but more solid than liquid output.     REVIEW OF SYSTEMS:  All other review of systems is negative unless indicated above.    OBJECTIVE:  T(C): 36.7 (24 @ 05:05), Max: 37 (24 @ 00:30)  HR: 106 (24 @ 04:01) (106 - 112)  BP: 116/57 (24 @ 04:01) (107/59 - 144/65)  RR: 18 (24 @ 04:01) (18 - 22)  SpO2: 93% (24 @ 04:01) (92% - 98%)  Daily     Daily Weight in k.8 (2024 09:00)    Physical Exam:  General: in no acute distress  Eyes: EOMI intact bilaterally.   HENT: Moist mucous membranes  Lungs: CTA B/L.   Cardiovascular: RRR.   Abdomen: Non tender, Midline ECF bag draining green liquid, R ileostomy site with yellow/brown output  Extremities: WWP  MSK: equal  strength b/l  Neurological: Alert and oriented x3  Skin: jaundiced    Medications:  MEDICATIONS  (STANDING):  cefepime   IVPB 2000 milliGRAM(s) IV Intermittent every 12 hours  chlorhexidine 2% Cloths 1 Application(s) Topical <User Schedule>  enoxaparin Injectable 40 milliGRAM(s) SubCutaneous every 24 hours  levothyroxine 50 MICROGram(s) Oral daily  lipid, fat emulsion (Fish Oil and Plant Based) 20% Infusion 0.54 Gm/kG/Day (20.83 mL/Hr) IV Continuous <Continuous>  metoprolol tartrate 50 milliGRAM(s) Oral every 12 hours  Parenteral Nutrition - Adult 1 Each TPN Continuous <Continuous>  venlafaxine XR. 150 milliGRAM(s) Oral two times a day    MEDICATIONS  (PRN):  zolpidem 10 milliGRAM(s) Oral at bedtime PRN Insomnia      Labs:                        8.3    8.13  )-----------( 166      ( 01 Mar 2024 07:23 )             27.6     03-    135  |  104  |  28<H>  ----------------------------<  68<L>  4.5   |  25  |  1.46<H>    Ca    8.8      01 Mar 2024 07:23  Phos  3.5     03-  Mg     1.9         TPro  8.2  /  Alb  2.3<L>  /  TBili  5.0<H>  /  DBili  x   /  AST  144<H>  /  ALT  90<H>  /  AlkPhos  140<H>  03-01    PT/INR - ( 2024 05:30 )   PT: 14.8 sec;   INR: 1.31          PTT - ( 2024 05:30 )  PTT:34.6 sec  Urinalysis Basic - ( 01 Mar 2024 07:23 )    Color: x / Appearance: x / SG: x / pH: x  Gluc: 68 mg/dL / Ketone: x  / Bili: x / Urobili: x   Blood: x / Protein: x / Nitrite: x   Leuk Esterase: x / RBC: x / WBC x   Sq Epi: x / Non Sq Epi: x / Bacteria: x      SARS-CoV-2: NotDetec (15 Sukhdev 2024 11:15)  SARS-CoV-2: NotDetec (2023 00:09)  SARS-CoV-2: NotDetec (15 Nov 2023 12:11)  COVID-19 PCR: NotDetec (2023 20:59)  SARS-CoV-2: NotDetec (31 Oct 2023 09:16)  SARS-CoV-2: NotDete (10 Sep 2023 18:17)      Radiology: Reviewed

## 2024-03-01 NOTE — PROGRESS NOTE ADULT - SUBJECTIVE AND OBJECTIVE BOX
SUBJECTIVE: Patient without acute complaints.      cefepime   IVPB 2000 milliGRAM(s) IV Intermittent every 12 hours  enoxaparin Injectable 40 milliGRAM(s) SubCutaneous every 24 hours  metoprolol tartrate 50 milliGRAM(s) Oral every 12 hours      Vital Signs Last 24 Hrs  T(C): 35.9 (01 Mar 2024 14:36), Max: 37 (01 Mar 2024 00:30)  T(F): 96.7 (01 Mar 2024 14:36), Max: 98.6 (01 Mar 2024 00:30)  HR: 108 (01 Mar 2024 16:40) (104 - 110)  BP: 120/58 (01 Mar 2024 16:40) (107/59 - 139/65)  BP(mean): 83 (01 Mar 2024 16:40) (74 - 93)  RR: 14 (01 Mar 2024 16:40) (14 - 18)  SpO2: 96% (01 Mar 2024 16:40) (92% - 98%)    Parameters below as of 01 Mar 2024 16:40  Patient On (Oxygen Delivery Method): room air      I&O's Detail    29 Feb 2024 07:01  -  01 Mar 2024 07:00  --------------------------------------------------------  IN:  Total IN: 0 mL    OUT:    Drain (mL): 0 mL    Drain (mL): 2200 mL    Voided (mL): 1020 mL  Total OUT: 3220 mL    Total NET: -3220 mL      01 Mar 2024 07:01  -  01 Mar 2024 17:27  --------------------------------------------------------  IN:    Oral Fluid: 740 mL  Total IN: 740 mL    OUT:    Drain (mL): 0 mL    Drain (mL): 475 mL    Voided (mL): 225 mL  Total OUT: 700 mL    Total NET: 40 mL          General: NAD, resting comfortably in bed  C/V: NSR  Pulm: Nonlabored breathing, no respiratory distress  Abd: soft, NT/ND, ECF with light bilious output no teetee blood  Extrem: WWP, no edema, SCDs in place        LABS:                        8.3    8.13  )-----------( 166      ( 01 Mar 2024 07:23 )             27.6     03-01    135  |  104  |  28<H>  ----------------------------<  68<L>  4.5   |  25  |  1.46<H>    Ca    8.8      01 Mar 2024 07:23  Phos  3.5     03-01  Mg     1.9     03-01    TPro  8.2  /  Alb  2.3<L>  /  TBili  5.0<H>  /  DBili  x   /  AST  144<H>  /  ALT  90<H>  /  AlkPhos  140<H>  03-01    PT/INR - ( 29 Feb 2024 05:30 )   PT: 14.8 sec;   INR: 1.31          PTT - ( 29 Feb 2024 05:30 )  PTT:34.6 sec  Urinalysis Basic - ( 01 Mar 2024 07:23 )    Color: x / Appearance: x / SG: x / pH: x  Gluc: 68 mg/dL / Ketone: x  / Bili: x / Urobili: x   Blood: x / Protein: x / Nitrite: x   Leuk Esterase: x / RBC: x / WBC x   Sq Epi: x / Non Sq Epi: x / Bacteria: x        RADIOLOGY & ADDITIONAL STUDIES:

## 2024-03-01 NOTE — DIETITIAN INITIAL EVALUATION ADULT - OTHER CALCULATIONS
Ideal body weight (86.4kg) used for calculations as pt >100% IBW and BMI <30 per [facility] Standards of Care. Needs estimated for age and adjusted for current clinical status, increased needs for post-op & abd wound healing, additional protein depending on outputs. Fluid needs per team

## 2024-03-01 NOTE — DIETITIAN INITIAL EVALUATION ADULT - PERTINENT LABORATORY DATA
03-01    135  |  104  |  28<H>  ----------------------------<  68<L>  4.5   |  25  |  1.46<H>    Ca    8.8      01 Mar 2024 07:23  Phos  3.5     03-01  Mg     1.9     03-01    TPro  8.2  /  Alb  2.3<L>  /  TBili  5.0<H>  /  DBili  x   /  AST  144<H>  /  ALT  90<H>  /  AlkPhos  140<H>  03-01  POCT Blood Glucose.: 97 mg/dL (02-29-24 @ 18:20)  A1C with Estimated Average Glucose Result: 5.1 % (09-11-23 @ 04:47)  A1C with Estimated Average Glucose Result: 4.8 % (06-27-23 @ 05:30)

## 2024-03-01 NOTE — DIETITIAN INITIAL EVALUATION ADULT - PROBLEM SELECTOR PLAN 5
Subjective   Patient ID: Svetlana is a 32 year old female who presents today for prenatal visit.  She is of 15w0d gestation.  OB History    Para Term  AB Living   5 2 2 0 2 2   SAB TAB Ectopic Molar Multiple Live Births   2 0 0 0 0 2   Obstetric Comments   LMP 19 9.6 wks MYRON 2020   Menses monthly Q 30 days for 3 days. Menarche: 14 y/o   Symptoms since LMP: nausea, fatigue   Last pap done: 18 wnl per pt. Pt denies h/o abnormal pap smears.   Genetics: FTS discussed with pt and declined.    Genetic screening discussed with pt and no significant findings.    Prepregnancy wt: 142 lbs   Medications: PNV   FTS education discussed with pt and has no additional questions.   Pt verbally consented for blood transfusion if needed.   Pt verbally consented for HIV testing.   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 Pt c/o headaches ( pt had migraine yesterday with light sensitivity)    ASSESSMENT:  A/P 31yo  pregnancy at 15w0d weeks gestation.  Problem List Items Addressed This Visit     None      Visit Diagnoses     15 weeks gestation of pregnancy    -  Primary    Relevant Orders    US OB W FETAL ANATOMIC EVAL AND TRANSVAG SINGLE GESTATION    Pregnancy, unspecified gestational age              PLAN:  1) FHTs +  2) PNL utd; anatomy order given  3) Genetics: declined  4) HA- pt states has had for past 2wks. Yesterday had migraine not relieved with tylenol. Reviewed likely due to hormonal changes but neurology consult reviewed. Pt would like to hold off for now but to call if worsen  All questions answered     CT chest: Slightly increased mild right greater than left pleural effusions with subjacent atelectasis. Few patchy nodular opacities RLL. Satting well on room air at this time, breathing comfortably   -Incentive spirometry   -Duonebs PRN

## 2024-03-02 NOTE — DISCHARGE NOTE PROVIDER - NSDCMRMEDTOKEN_GEN_ALL_CORE_FT
allopurinol 300 mg oral tablet: 1 tab(s) orally once a day  Ambien 10 mg oral tablet: 1 tab(s) orally once a day (at bedtime)  Hospital Bed ICD 10: R53.81: Use for deconditioning post prolonged hospital course  Hospital Bed ICD 10: R53.81: Use for deconditioning post prolonged hospital course  Hospital Bed ICD 10: R53.81: Use for deconditioning post prolonged hospital course  Hospital Bed ICD 10: R53.81: Use for deconditioning post prolonged hospital course  Lovenox 40 mg/0.4 mL injectable solution: 40 milligram(s) subcutaneously once a day  metoprolol tartrate 50 mg oral tablet: 1 orally 2 times a day  rosuvastatin 5 mg oral tablet: 1 tab(s) orally once a day  Synthroid 50 mcg (0.05 mg) oral tablet: 1 tab(s) orally once a day  teduglutide 5 mg subcutaneous kit: 0.05 mg/kg subcutaneously once a day  venlafaxine 150 mg oral tablet, extended release: 1 tab(s) orally 2 times a day   allopurinol 300 mg oral tablet: 1 tab(s) orally once a day  Ambien 10 mg oral tablet: 1 tab(s) orally once a day (at bedtime)  Lovenox 40 mg/0.4 mL injectable solution: 40 milligram(s) subcutaneously once a day  metoprolol tartrate 50 mg oral tablet: 1 tab(s) orally 2 times a day  rosuvastatin 5 mg oral tablet: 1 tab(s) orally once a day  Synthroid 50 mcg (0.05 mg) oral tablet: 1 tab(s) orally once a day  teduglutide 5 mg subcutaneous kit: 0.05 mg/kg subcutaneously once a day  venlafaxine 150 mg oral tablet, extended release: 1 tab(s) orally 2 times a day   allopurinol 300 mg oral tablet: 1 tab(s) orally once a day  Ambien 10 mg oral tablet: 1 tab(s) orally once a day (at bedtime)  Lovenox 40 mg/0.4 mL injectable solution: 40 milligram(s) subcutaneously once a day  metoprolol tartrate 50 mg oral tablet: 1 tab(s) orally 2 times a day  pancrelipase 24,000 units-76,000 units-120,000 units oral delayed release capsule: 1 cap(s) orally 3 times a day (with meals)  rosuvastatin 5 mg oral tablet: 1 tab(s) orally once a day  Synthroid 50 mcg (0.05 mg) oral tablet: 1 tab(s) orally once a day  teduglutide 5 mg subcutaneous kit: 0.05 mg/kg subcutaneously once a day  venlafaxine 150 mg oral tablet, extended release: 1 tab(s) orally 2 times a day

## 2024-03-02 NOTE — PROGRESS NOTE ADULT - PROBLEM SELECTOR PLAN 10
F: s/p 2.5 NS and 2L LR   E: replete K<4, Mg<2  N: regular  VTE Prophylaxis: Lovenox   GI: None   C: Full Code  D: 7Lalucia

## 2024-03-02 NOTE — PROGRESS NOTE ADULT - PROBLEM SELECTOR PLAN 8
home med: Synthroid 50 mcg QD  -Continue home med

## 2024-03-02 NOTE — PROGRESS NOTE ADULT - PROBLEM SELECTOR PLAN 5
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

## 2024-03-02 NOTE — PROGRESS NOTE ADULT - PROBLEM SELECTOR PLAN 3
RUQ US: Distended gallbladder with wall thickening and sludge. No Capellan's sign. Dilated common bile duct, measuring 1.0 cm.  Bilirubin downtrending from 5 --> 4.2 (previously admission 4-4.5)  - Colorectal surg following

## 2024-03-02 NOTE — DISCHARGE NOTE PROVIDER - NSDCCPCAREPLAN_GEN_ALL_CORE_FT
PRINCIPAL DISCHARGE DIAGNOSIS  Diagnosis: Pancreatic lesion  Assessment and Plan of Treatment: You presented to the hospital with x1 day of centralized abdominal pain, nausea, vomiting and high output from ECF.  Imaging workup showed as follows:   CT A/P: Persistent pancreatic ductal dilatation within the body and tail with an apparent 2.0 cm low-attenuation mass within the midline body at the site of caliber change.   MRCP: Distended pancreatic duct to the level of the pancreatic neck, where 2 adjacent subcentimeter cystic lesions are noted. Clustered small cysts in the pancreatic tail.   Given resolution of abdominal pain, further workup was deferred during hospitalization with preference for further workup outpatient.        PRINCIPAL DISCHARGE DIAGNOSIS  Diagnosis: Abdominal pain  Assessment and Plan of Treatment: You presented to the hospital with x1 day of centralized abdominal pain, nausea, vomiting and high output from ECF.  Imaging workup showed as follows:   CT A/P: Persistent pancreatic ductal dilatation within the body and tail with an apparent 2.0 cm low-attenuation mass within the midline body at the site of caliber change.   MRCP: Distended pancreatic duct to the level of the pancreatic neck, where 2 adjacent subcentimeter cystic lesions are noted. Clustered small cysts in the pancreatic tail.   Given resolution of abdominal pain, further workup was deferred during hospitalization with preference for further workup outpatient. Please follow up outpatient for further workup.

## 2024-03-02 NOTE — PROGRESS NOTE ADULT - PROBLEM SELECTOR PLAN 6
Diagnosis of AFib/Flutter s/p DCCVs. home med, lopressor 50 mg BID PO, currently 100-110s   -resuming lopressor 50mg BID

## 2024-03-02 NOTE — DISCHARGE NOTE PROVIDER - HOSPITAL COURSE
#Discharge: do not delete     Patient is a 78M w PMH Crohn's, AFib/Flutter s/p DCCVs on amiodarone, remote ileocectomy, recent prolonged hospital course on surgical service with extensive abdominal surgeries now s/p midline ECF and ileostomy. Admitted for abdominal pain and increased ECF output.     Hospital course (by problem):     #Sepsis.   Meeting SIRS criteria with -110s with leukocytosis. Extensive surgery course with EC fistula c/b biliary cx revealing Carbapenem res Psa and Ecoli in 12/23.   CT A/P revealed mildly enlarged GB with mild pericholecystic fluid. RUQ US: Distended gallbladder with wall thickening and sludge. No Capellan's sign. Dilated common bile duct, measuring 1.0 cm. Patient treated w cefepime with downtrending WBC and resolution of abdominal pain prior to discharge.   Plan  - continue abx at home:???    #ELVI (acute kidney injury).   Cr 1.4 on admission, prerenal etiology given volume status and increased ostomy output. Patient was treated with IV fluids and PO intake was encouraged with improvement in renal function.   Plan  - encourage PO intake  - monitor ostomy output    #Common bile duct dilation.   RUQ US: Distended gallbladder with wall thickening and sludge and Dilated common bile duct, measuring 1.0 cm.  Bilirubin elevated to 5-->4.8 (previously admission 4-4.5)  Plan  - patient to follow up outpatient with surgery    #Pancreatic duct dilatation and mass  CTAP: Persistent pancreatic ductal dilatation within the body and tail with an apparent 2.0 cm low-attenuation mass within the midline body at the site of caliber change  MRCP: Distended pancreatic duct to the level of the pancreatic neck, where 2 adjacent subcentimeter cystic lesions are noted; cannot exclude cystic neoplasms versus side branch IPMNs (intraductal papillary mucinous neoplasm). Clustered small cysts in the pancreatic tail, possibly small IPMNs.  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 Gattex was initially held given biliary colic, however restarted once abdominal pain resolved  Plan  - c/w TPN  - c/w gattex  - c/w low res, high protein diet    #Afib.   Diagnosis of AFib/Flutter s/p PPM placed on prior admission. Home meds lopressor   .home med, lopressor 50 mg BID PO, currently 100-110s   -resuming lopressor 50mg BID.     Problem/Plan - 7:  ·  Problem: Pleural effusion.   ·  Plan: CT chest: Slightly increased mild right greater than left pleural effusions with subjacent atelectasis. Few patchy nodular opacities RLL. Satting well on room air at this time, breathing comfortably   -Incentive spirometry.     Problem/Plan - 8:  ·  Problem: Hypothyroid.   ·  Plan: home med: Synthroid 50 mcg QD  -Continue home med.     Problem/Plan - 9:  ·  Problem: Depression, major.   ·  Plan: home med: venlafaxine 150 mg BID  -Recommend continuing home med.        Concern for sepsis with CBD dilatation. Pt started on cefepime (2/28-, with downtrending WBC. MRCP on 3/1 with evidence of distended pancreatic duct w/ 2 adjacent subcentimeter cystic lesions. TPN started on 3/1. Colorectal surg following.    Patient was discharged to: (home/[facility]/acute rehab/hospice, etc, and with what services – home health PT/RN? Home O2?)    New medications:   Changes to old medications:  Medications that were stopped:    Items to follow up as outpatient:    Physical exam at the time of discharge:       #Discharge: do not delete     Patient is a 78M w PMH Crohn's, AFib/Flutter s/p DCCVs on amiodarone, remote ileocectomy, recent prolonged hospital course on surgical service with extensive abdominal surgeries now s/p midline ECF and ileostomy. Admitted for abdominal pain and increased ECF output.     Hospital course (by problem):     #Sepsis.   Meeting SIRS criteria with -110s with leukocytosis. Extensive surgery course with EC fistula c/b biliary cx revealing Carbapenem res Psa and Ecoli in 12/23.   CT A/P revealed mildly enlarged GB with mild pericholecystic fluid. RUQ US: Distended gallbladder with wall thickening and sludge. No Capellan's sign. Dilated common bile duct, measuring 1.0 cm. Patient treated w cefepime with downtrending WBC and resolution of abdominal pain prior to discharge.   Plan  - continue abx at home:???    #ELVI (acute kidney injury)  IMPROVING  Most likely prerenal etiology given volume status and increased ostomy output. Patient was treated with IV fluids and PO intake was encouraged with improvement in renal function.   Plan  - encourage PO intake  - monitor ostomy output    #Common bile duct dilation  RUQ US: Distended gallbladder with wall thickening and sludge and Dilated common bile duct, measuring 1.0 cm.  Bilirubin elevated to 5-->4.8 (previously admission 4-4.5)  Plan  - patient to follow up outpatient with surgery    #Pancreatic duct dilatation and mass  CTAP: Persistent pancreatic ductal dilatation within the body and tail with an apparent 2.0 cm low-attenuation mass within the midline body at the site of caliber change  MRCP: Distended pancreatic duct to the level of the pancreatic neck, where 2 adjacent subcentimeter cystic lesions are noted; cannot exclude cystic neoplasms versus side branch IPMNs (intraductal papillary mucinous neoplasm). Clustered small cysts in the pancreatic tail, possibly small IPMNs. Patient was started on creon   Plan  - follow up hepatobiliary service outpatient   - continue creon??    #History of short bowel syndrome  History of Short bowel syndrome on last admission, high output w/ EC fistula on TPN at home. Gattex was initially held given biliary colic, however restarted once abdominal pain resolved  Plan  - c/w TPN  - c/w gattex  - c/w low fat, low fiber, high protein diet    #Afib.   Diagnosis of AFib/Flutter s/p PPM placed on prior admission. Home med lopressor 50 bid was continued during hospitalization.   Plan  - c/w home meds    #Pleural effusion.   Slightly increased mild right greater than left pleural effusions with subjacent atelectasis. Few patchy nodular opacities RLL. Satting well on room air at this time, breathing comfortably. Incentive spirometry was used during hospitalization.   Plan  monitor breathing    Hypothyroid.   home med: Synthroid 50 mcg QD was continued during hospitalization  Plan  -Continue home med.    #Depression, major.   home med: venlafaxine 150 mg BID was continued during hospitalization  Plan  -continue home med    Patient was discharged to: home    New medications: abx?? creon??  Changes to old medications: none  Medications that were stopped: none    Items to follow up as outpatient:  follow up hepatobiliary service outpatient     Physical exam at the time of discharge:  GENERAL: NAD, well-groomed, well-developed  HEAD:  Atraumatic, Normocephalic  EYES: EOMI, PERRLA, conjunctiva and sclera clear  ENMT: Moist mucous membranes, Good dentition, No lesions  NERVOUS SYSTEM:  Alert & Oriented X3, Good concentration  CHEST/LUNG: Clear to auscultation bilaterally; No rales, rhonchi, wheezing, or rubs  HEART: Regular rate and rhythm; No murmurs, rubs, or gallops  ABDOMEN: Soft, Nontender, Nondistended; slight jaundice appearing; colostomy noted --> clean, dry, intact   EXTREMITIES:  2+ Peripheral Pulses, No clubbing, cyanosis, or edema         #Discharge: do not delete     Patient is a 78M w PMH Crohn's, AFib/Flutter s/p DCCVs on amiodarone, remote ileocectomy, recent prolonged hospital course on surgical service with extensive abdominal surgeries now s/p midline ECF and ileostomy. Admitted for abdominal pain and increased ECF output.     Hospital course (by problem):     #Sepsis  Meeting SIRS criteria with -110s with leukocytosis. Extensive surgery course with EC fistula c/b biliary cx revealing Carbapenem res Psa and Ecoli in 12/23.   CT A/P revealed mildly enlarged GB with mild pericholecystic fluid. RUQ US: Distended gallbladder with wall thickening and sludge. No Capellan's sign. Dilated common bile duct, measuring 1.0 cm. Patient treated w cefepime with downtrending WBC and resolution of abdominal pain prior to discharge.   Plan  - continue abx at home:???    #ELVI (acute kidney injury)  IMPROVING  Most likely prerenal etiology given volume status and increased ostomy output. Patient was treated with IV fluids and PO intake was encouraged with improvement in renal function.   Plan  - encourage PO intake  - monitor ostomy output    #Common bile duct dilation  RUQ US: Distended gallbladder with wall thickening and sludge and Dilated common bile duct, measuring 1.0 cm.  Bilirubin elevated to 5-->4.8 (previously admission 4-4.5)  Plan  - patient to follow up outpatient with surgery    #Pancreatic duct dilatation and mass  CTAP: Persistent pancreatic ductal dilatation within the body and tail with an apparent 2.0 cm low-attenuation mass within the midline body at the site of caliber change  MRCP: Distended pancreatic duct to the level of the pancreatic neck, where 2 adjacent subcentimeter cystic lesions are noted; cannot exclude cystic neoplasms versus side branch IPMNs (intraductal papillary mucinous neoplasm). Clustered small cysts in the pancreatic tail, possibly small IPMNs. Patient was started on creon   Plan  - follow up hepatobiliary service outpatient   - continue creon??    #History of short bowel syndrome  History of Short bowel syndrome on last admission, high output w/ EC fistula on TPN at home. Gattex was initially held given biliary colic, however restarted once abdominal pain resolved  Plan  - c/w TPN  - c/w gattex  - c/w low fat, low fiber, high protein diet    #Afib.   Diagnosis of AFib/Flutter s/p PPM placed on prior admission. Home med lopressor 50 bid was continued during hospitalization.   Plan  - c/w home meds    #Pleural effusion.   Slightly increased mild right greater than left pleural effusions with subjacent atelectasis. Few patchy nodular opacities RLL. Satting well on room air at this time, breathing comfortably. Incentive spirometry was used during hospitalization.   Plan  monitor breathing    Hypothyroid.   home med: Synthroid 50 mcg QD was continued during hospitalization  Plan  -Continue home med.    #Depression, major.   home med: venlafaxine 150 mg BID was continued during hospitalization  Plan  -continue home med    Patient was discharged to: home    New medications: abx?? creon??  Changes to old medications: none  Medications that were stopped: none    Items to follow up as outpatient:  follow up hepatobiliary service outpatient     Physical exam at the time of discharge:  GENERAL: NAD, well-groomed, well-developed  HEAD:  Atraumatic, Normocephalic  EYES: EOMI, PERRLA, conjunctiva and sclera clear  ENMT: Moist mucous membranes, Good dentition, No lesions  NERVOUS SYSTEM:  Alert & Oriented X3, Good concentration  CHEST/LUNG: Clear to auscultation bilaterally; No rales, rhonchi, wheezing, or rubs  HEART: Regular rate and rhythm; No murmurs, rubs, or gallops  ABDOMEN: Soft, Nontender, Nondistended; slight jaundice appearing; colostomy noted --> clean, dry, intact   EXTREMITIES:  2+ Peripheral Pulses, No clubbing, cyanosis, or edema         #Discharge: do not delete    Patient is a 78M w PMH Crohn's, AFib/Flutter s/p DCCVs on amiodarone, remote ileocecostomy, recent prolonged hospital course on surgical service with extensive abdominal surgeries now s/p midline ECF and ileostomy. Admitted for abdominal pain and increased ECF output.     Hospital course (by problem):     #Sepsis  Meeting SIRS criteria with -110s with leukocytosis. Extensive surgery course with EC fistula c/b biliary cx revealing Carbapenem res Psa and Ecoli in 12/23.   CT A/P revealed mildly enlarged GB with mild pericholecystic fluid. RUQ US: Distended gallbladder with wall thickening and sludge. No Capellan's sign. Dilated common bile duct, measuring 1.0 cm. Patient treated w cefepime with downtrending WBC and resolution of abdominal pain prior to discharge.   Plan  - outpatient f/u with G    #ELVI (acute kidney injury)  IMPROVING  Most likely prerenal etiology given volume status and increased ostomy output. Patient was treated with IV fluids and PO intake was encouraged with improvement in renal function.   Plan  - encourage PO intake  - monitor ostomy output    #Common bile duct dilation  RUQ US: Distended gallbladder with wall thickening and sludge and Dilated common bile duct, measuring 1.0 cm.  Bilirubin elevated to 5-->4.8 (previously admission 4-4.5)  Plan  - patient to follow up outpatient with surgery    #Pancreatic duct dilatation and mass  CTAP: Persistent pancreatic ductal dilatation within the body and tail with an apparent 2.0 cm low-attenuation mass within the midline body at the site of caliber change  MRCP: Distended pancreatic duct to the level of the pancreatic neck, where 2 adjacent subcentimeter cystic lesions are noted; cannot exclude cystic neoplasms versus side branch IPMNs (intraductal papillary mucinous neoplasm). Clustered small cysts in the pancreatic tail, possibly small IPMNs. Patient was started on creon   Plan  - follow up hepatobiliary service outpatient   - continue creon??    #History of short bowel syndrome  History of Short bowel syndrome on last admission, high output w/ EC fistula on TPN at home. Gattex was initially held given biliary colic, however restarted once abdominal pain resolved  Plan  - c/w TPN  - c/w gattex  - c/w low fat, low fiber, high protein diet    #Afib.   Diagnosis of AFib/Flutter s/p PPM placed on prior admission. Home med lopressor 50 bid was continued during hospitalization.   Plan  - c/w home meds    #Pleural effusion.   Slightly increased mild right greater than left pleural effusions with subjacent atelectasis. Few patchy nodular opacities RLL. Satting well on room air at this time, breathing comfortably. Incentive spirometry was used during hospitalization.   Plan  monitor breathing    Hypothyroid.   home med: Synthroid 50 mcg QD was continued during hospitalization  Plan  -Continue home med.    #Depression, major.   home med: venlafaxine 150 mg BID was continued during hospitalization  Plan  -continue home med    Patient was discharged to: home    New medications: abx?? creon??  Changes to old medications: none  Medications that were stopped: none    Items to follow up as outpatient:  follow up hepatobiliary service outpatient     Physical exam at the time of discharge:  GENERAL: NAD, well-groomed, well-developed  HEAD:  Atraumatic, Normocephalic  EYES: EOMI, PERRLA, conjunctiva and sclera clear  ENMT: Moist mucous membranes, Good dentition, No lesions  NERVOUS SYSTEM:  Alert & Oriented X3, Good concentration  CHEST/LUNG: Clear to auscultation bilaterally; No rales, rhonchi, wheezing, or rubs  HEART: Regular rate and rhythm; No murmurs, rubs, or gallops  ABDOMEN: Soft, Nontender, Nondistended; slight jaundice appearing; colostomy noted --> clean, dry, intact   EXTREMITIES:  2+ Peripheral Pulses, No clubbing, cyanosis, or edema         #Discharge: do not delete    Patient is a 78M w PMH Crohn's, AFib/Flutter s/p DCCVs on amiodarone, remote ileocecostomy, recent prolonged hospital course on surgical service with extensive abdominal surgeries now s/p midline ECF and ileostomy. Admitted for abdominal pain and increased ECF output.     Hospital course (by problem):     #Sepsis  Meeting SIRS criteria with -110s with leukocytosis. Extensive surgery course with EC fistula c/b biliary cx revealing Carbapenem res Psa and Ecoli in 12/23.   CT A/P revealed mildly enlarged GB with mild pericholecystic fluid. RUQ US: Distended gallbladder with wall thickening and sludge. No Capellan's sign. Dilated common bile duct, measuring 1.0 cm. Patient treated w cefepime with downtrending WBC and resolution of abdominal pain prior to discharge. S/p cefepime x 3 days.   - outpatient PCP and surgery follow up    #ELVI (acute kidney injury)  IMPROVING  Most likely prerenal etiology given volume status and increased ostomy output. Patient was treated with IV fluids and PO intake was encouraged with improvement in renal function.   Plan  - encourage PO intake  - monitor ostomy output    #Common bile duct dilation  RUQ US: Distended gallbladder with wall thickening and sludge and Dilated common bile duct, measuring 1.0 cm.  Bilirubin elevated to 5-->4.8 (previously admission 4-4.5)  Plan  - patient to follow up outpatient with surgery    #Pancreatic duct dilatation and mass  CTAP: Persistent pancreatic ductal dilatation within the body and tail with an apparent 2.0 cm low-attenuation mass within the midline body at the site of caliber change  MRCP: Distended pancreatic duct to the level of the pancreatic neck, where 2 adjacent subcentimeter cystic lesions are noted; cannot exclude cystic neoplasms versus side branch IPMNs (intraductal papillary mucinous neoplasm). Clustered small cysts in the pancreatic tail, possibly small IPMNs. 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 Gattex was initially held given biliary colic, however restarted once abdominal pain resolved  Plan  - c/w TPN  - c/w gattex  - c/w low fat, low fiber, high protein diet    #Afib.   Diagnosis of AFib/Flutter s/p PPM placed on prior admission. Home med lopressor 50 bid was continued during hospitalization.   Plan  - c/w home meds    #Pleural effusion.   Slightly increased mild right greater than left pleural effusions with subjacent atelectasis. Few patchy nodular opacities RLL. Satting well on room air at this time, breathing comfortably. Incentive spirometry was used during hospitalization.   Plan  monitor breathing    Hypothyroid.   home med: Synthroid 50 mcg QD was continued during hospitalization  Plan  -Continue home med.    #Depression, major.   home med: venlafaxine 150 mg BID was continued during hospitalization  Plan  -continue home med    Patient was discharged to: home    New medications: creon [id number] tid  Changes to old medications: none  Medications that were stopped: none    Items to follow up as outpatient:  follow up hepatobiliary service outpatient     Physical exam at the time of discharge:  GENERAL: NAD, well-groomed, well-developed  HEAD:  Atraumatic, Normocephalic  EYES: EOMI, PERRLA, conjunctiva and sclera clear  ENMT: Moist mucous membranes, Good dentition, No lesions  NERVOUS SYSTEM:  Alert & Oriented X3, Good concentration  CHEST/LUNG: Clear to auscultation bilaterally; No rales, rhonchi, wheezing, or rubs  HEART: Regular rate and rhythm; No murmurs, rubs, or gallops  ABDOMEN: Soft, Nontender, Nondistended; slight jaundice appearing; colostomy noted --> clean, dry, intact   EXTREMITIES:  2+ Peripheral Pulses, No clubbing, cyanosis, or edema

## 2024-03-02 NOTE — PROGRESS NOTE ADULT - SUBJECTIVE AND OBJECTIVE BOX
LARRY KELLY  78y  Male      Patient is a 78y old  Male who presents with a chief complaint of sepsis (02 Mar 2024 15:06)      INTERVAL HPI/OVERNIGHT EVENTS: Overnight AMRITA. This morning, patient is pleasant, conversant and well-appearing. Denies any CP, SOB, N/V, GERD or abdominal pain. Reports tolerating PO intake and feels comfortable with going home tomorrow.     Vital Signs Last 24 Hrs  T(C): 36.6 (02 Mar 2024 14:01), Max: 36.9 (02 Mar 2024 06:24)  T(F): 97.9 (02 Mar 2024 14:01), Max: 98.4 (02 Mar 2024 06:24)  HR: 104 (02 Mar 2024 12:16) (102 - 108)  BP: 111/56 (02 Mar 2024 12:16) (110/53 - 137/63)  BP(mean): 79 (02 Mar 2024 12:16) (77 - 90)  RR: 18 (02 Mar 2024 12:16) (14 - 18)  SpO2: 96% (02 Mar 2024 12:16) (95% - 100%)    Parameters below as of 02 Mar 2024 12:16  Patient On (Oxygen Delivery Method): room air        PHYSICAL EXAM:  GENERAL: NAD, well-groomed, well-developed  HEAD:  Atraumatic, Normocephalic  EYES: EOMI, PERRLA, conjunctiva and sclera clear  ENMT: Moist mucous membranes, Good dentition, No lesions  NERVOUS SYSTEM:  Alert & Oriented X3, Good concentration  CHEST/LUNG: Clear to auscultation bilaterally; No rales, rhonchi, wheezing, or rubs  HEART: Regular rate and rhythm; No murmurs, rubs, or gallops  ABDOMEN: Soft, Nontender, Nondistended; colostomy noted --> clean, dry, intact   EXTREMITIES:  2+ Peripheral Pulses, No clubbing, cyanosis, or edema    Consultant(s) Notes Reviewed:  [x ] YES  [ ] NO  Care Discussed with Consultants/Other Providers [ x] YES  [ ] NO    LABS:                        8.0    7.38  )-----------( 164      ( 02 Mar 2024 05:30 )             25.5     03-02    135  |  101  |  32<H>  ----------------------------<  91  4.5   |  29  |  1.30    Ca    8.2<L>      02 Mar 2024 05:30  Phos  3.7     03-02  Mg     2.1     03-02    TPro  7.8  /  Alb  2.2<L>  /  TBili  4.2<H>  /  DBili  x   /  AST  127<H>  /  ALT  83<H>  /  AlkPhos  124<H>  03-02      Urinalysis Basic - ( 02 Mar 2024 05:30 )    Color: x / Appearance: x / SG: x / pH: x  Gluc: 91 mg/dL / Ketone: x  / Bili: x / Urobili: x   Blood: x / Protein: x / Nitrite: x   Leuk Esterase: x / RBC: x / WBC x   Sq Epi: x / Non Sq Epi: x / Bacteria: x        CAPILLARY BLOOD GLUCOSE          RADIOLOGY & ADDITIONAL TESTS:    Imaging Personally Reviewed:  [ ] YES  [ ] NO

## 2024-03-02 NOTE — PROGRESS NOTE ADULT - PROBLEM SELECTOR PLAN 2
Cr 1.4 on admission. Baseline 1.1-1.2. suspect prerenal etiology given volume status and increased ostomy output.   Cr 1.4 --> 1.45 --> 1.46 --> 1.30  s/p 2L NS and 2.5L LR  - Cr is improving   - continue to monitor
Cr 1.4 on admission. Baseline 1.1-1.2. suspect prerenal etiology given volume status and increased ostomy output.   Cr 1.4 --> 1.45 --> 1.46  s/p 2L NS and 2.5L LR  UOsm 677 and Rebeca 67  - continue to trend  - consider fluids given Cr unchanged despite fluids given on admission
Cr 1.4 on admission. Baseline 1.1-1.2. suspect prerenal etiology given volume status and increased ostomy output.   Cr 1.4 --> 1.45  s/p 2L NS and 2.5L LR  - f/u urine lytes

## 2024-03-02 NOTE — PROGRESS NOTE ADULT - PROBLEM SELECTOR PLAN 9
home med: venlafaxine 150 mg BID  -Recommend continuing home med
home med: venlafaxine 150 mg BID  -Recommend continuing home med
home med: venlafaxine 150 mg BID  - c/w venlafaxine 150 mg BID

## 2024-03-02 NOTE — PROGRESS NOTE ADULT - PROBLEM SELECTOR PLAN 4
CTAP: Persistent pancreatic ductal dilatation within the body and tail with an apparent 2.0 cm low-attenuation mass within the midline body at the site of caliber change, concerning for pancreatic malignancy.   - follow colorectal surg  - f/u MRCP
CTAP: Persistent pancreatic ductal dilatation within the body and tail with an apparent 2.0 cm low-attenuation mass within the midline body at the site of caliber change, concerning for pancreatic malignancy.   - follow colorectal surg  - per surg: order MRCP
CTAP: Persistent pancreatic ductal dilatation within the body and tail with an apparent 2.0 cm low-attenuation mass within the midline body at the site of caliber change. MRCP: pancreatic duct to the level of the pancreatic neck, where 2 adjacent subcentimeter cystic lesions are noted  - follow colorectal surg recs

## 2024-03-02 NOTE — PROGRESS NOTE ADULT - ATTENDING COMMENTS
AF, UC, ileostomy after SBR, short bowel syndrome  physical as above  continue empiric cefepime, WBC normalized  TPN written  bilirubin actually decreasing
UC, AF s/p SBR with ileostomy, enteroatmospheric fistula, abdominal pain, leukocytosis  physical as above  TPN written  MRCP with some dilation of pancreatic duct and cystic lesions  at this point will defer EUS and follow  continue cefepime  if BC negative hope to discharge next week  trial of creon
UC, AF, s/p SBR, ileostomy, enteroatmospheric fistula, now with abdominal pain and leukocytosis, cystic mass of pancreas  physical as above  if BC negative, resume TPN tomorrow  empiric cefepime continues; WBC down  CT of abdomen and US of abdomen with hyperbilirubinemia show cystic pancreatic mass; to consult pancreaticobiliary surgery  MRCP ordered  the gallbladder is distended but no pericholecystic fluid. Bilirubin is stable

## 2024-03-02 NOTE — PROGRESS NOTE ADULT - PROBLEM SELECTOR PLAN 7
CT chest: Slightly increased mild right greater than left pleural effusions with subjacent atelectasis. Few patchy nodular opacities RLL. Satting well on room air at this time, breathing comfortably   -Incentive spirometry

## 2024-03-03 NOTE — PROGRESS NOTE ADULT - ASSESSMENT
78M w PMH Crohn's, AFib/Flutter s/p DCCVs on amiodarone, remote ileocectomy and open appendectomy. Admitted (6/23) for SBO vs Crohns flare, s/p NGT decompression and s/p lap converted to open TRE, SBR x 3, left in discontinuity with abthera vac on (6/27), RTOR for ileocolic resection, small bowel anastomosis, and abdominal wall closure on (6/28), c/b fluid collection s/p IR aspiration of perihepatic fluid on (7/3), c/b wound dehiscence s/p RTOR exlap, washout, ileocolic resection with end ileostomy, blow hole colostomy, red rubber from ileostomy to small bowel anastomosis; vicryl bridging mesh on (7/5) and was kept in the SICU for multiple different ailments as well as for aggressive wound care. Patient was discharged home 2/12/24. Returned 2/16 and 2/23 for oozing from granulation tissue surrounding ECF. Admitted to medicine with x1 day of centralized abdominal pain, nausea, vomiting and high output from ECF.     Continue infectious workup  f/u MRCP read  f/u hepatobiliary surgery recs  General surgery team 5C will continue to follow
78M w PMH Crohn's, AFib/Flutter s/p DCCVs on amiodarone, remote ileocectomy and open appendectomy. Admitted (6/23) for SBO vs Crohns flare, s/p NGT decompression and s/p lap converted to open TRE, SBR x 3, left in discontinuity with abthera vac on (6/27), RTOR for ileocolic resection, small bowel anastomosis, and abdominal wall closure on (6/28), c/b fluid collection s/p IR aspiration of perihepatic fluid on (7/3), c/b wound dehiscence s/p RTOR exlap, washout, ileocolic resection with end ileostomy, blow hole colostomy, red rubber from ileostomy to small bowel anastomosis; vicryl bridging mesh on (7/5) and was kept in the SICU for multiple different ailments as well as for aggressive wound care. Patient was discharged home 2/12/24. Returned 2/16 and 2/23 for oozing from granulation tissue surrounding ECF. Now presents with x1 day of centralized abdominal pain, nausea, vomiting and high output from ECF.     Plan:  - c/w infectious source workup as per medicine  - C/w IV Abx  - Hepatobiliary service - patient not a surgical candidate at this time, could consider EUS with potential stent placement for symptomatic relief  - hold Gattex as it can cause/exacerbate biliary disease  - Team 4c will continue to follow, please call with any questions or concerns  
Better overall To discuss with the ICU staff  
78M w PMH Crohn's, AFib/Flutter s/p DCCVs on amiodarone, remote ileocectomy and open appendectomy. Admitted (6/23) for SBO vs Crohns flare, s/p NGT decompression and s/p lap converted to open TRE, SBR x 3, left in discontinuity with abthera vac on (6/27), RTOR for ileocolic resection, small bowel anastomosis, and abdominal wall closure on (6/28), c/b fluid collection s/p IR aspiration of perihepatic fluid on (7/3), c/b wound dehiscence s/p RTOR exlap, washout, ileocolic resection with end ileostomy, blow hole colostomy, red rubber from ileostomy to small bowel anastomosis; vicryl bridging mesh on (7/5) and was kept in the SICU for multiple different ailments as well as for aggressive wound care. Patient was discharged home 2/12/24. Returned 2/16 and 2/23 for oozing from granulation tissue surrounding ECF. Now presents with x1 day of centralized abdominal pain, nausea, vomiting and high output from ECF.     Plan:  - c/w infectious source workup as per medicine  - C/w IV Abx  - Will discuss with hepatobiliary service  - Will discuss continued bleeding with vascular surgery service  - hold Gattex as it can cause/exacerbate biliary disease  - Team 4c will continue to follow, please call with any questions or concerns
Cont IV antibiotics  Await MRCP and decision to drain GB
Creon added  Cont IV antibiotics  Ambulate today  Progressing well
On IV antibiotics  To review scan and sono this am re GB distention and new finding in the pancreas
 78M w PMH Crohn's, AFib/Flutter s/p DCCVs on amiodarone, remote ileocectomy, prolonged hospital course with extensive abdominal surgeries now s/p midline ECF and ileostomy. Admitted for abdominal pain and increased ECF output. Concern for sepsis with CBD dilatation, pt admitted to telemetry. 
 78M w PMH Crohn's, AFib/Flutter s/p DCCVs on amiodarone, remote ileocectomy, prolonged hospital course with extensive abdominal surgeries now s/p midline ECF and ileostomy. Admitted for abdominal pain and increased ECF output. Found to be septic with CBD dilatation, pt admitted to telemetry. 
 78M w PMH Crohn's, AFib/Flutter s/p DCCVs on amiodarone, remote ileocectomy, prolonged hospital course with extensive abdominal surgeries now s/p midline ECF and ileostomy. Admitted for abdominal pain and increased ECF output. Concern for sepsis with CBD dilatation pt admitted to telemetry. Today reporting improvement in symptoms.

## 2024-03-03 NOTE — DISCHARGE NOTE NURSING/CASE MANAGEMENT/SOCIAL WORK - PATIENT PORTAL LINK FT
You can access the FollowMyHealth Patient Portal offered by [facility] by registering at the following website: [email or web address]. By joining [employer]’s FollowMyHealth portal, you will also be able to view your health information using other applications (apps) compatible with our system.

## 2024-03-03 NOTE — PROGRESS NOTE ADULT - PROVIDER SPECIALTY LIST ADULT
Colorectal Surgery
Internal Medicine
Internal Medicine
Surgery
Colorectal Surgery
Surgery
Surgery
Internal Medicine

## 2024-03-11 NOTE — ED PROVIDER NOTE - CLINICAL SUMMARY MEDICAL DECISION MAKING FREE TEXT BOX
Patient with  hemodynamically significant hemorrhage from wound at ileostomy, controlled by silver nitrate and dressing, needed blood transfusion due to blood loss, patient with renal insufficiency and mild pulmonary edema on x-ray versus atelectasis, less likely pneumonia as no cough or fever, patient seen by surgery team and Dr. Peterson, ICU consulted for telemetry medicine bed

## 2024-03-11 NOTE — ED ADULT NURSE REASSESSMENT NOTE - NS ED NURSE REASSESS COMMENT FT1
Patient to receive blood transfusion. Labs reviewed. Patient consent in chart. Patient educated on possible signs of blood transfusion and informed to notify staff if patient were to develop any severe respiratory distress, flank pain, or any other major concerns. Patient to receive VS per policy and procedure. Blood product checked with second RN Scotty. Will continue to monitor with frequent VS and for possible transfusion reactions.

## 2024-03-11 NOTE — ED PROVIDER NOTE - INPATIENT RESIDENT/ACP NOTIFIED
Care Transition Telephone Call Attempt  Call Attempt:  2nd for onboarding.       Per patient, he is stable and on his way to f/u appt with PCP right now. He is not in need of any services at this time. Will close case at this time, but happy to assist patient in future if needs arise.    ICU team

## 2024-03-11 NOTE — ED ADULT NURSE NOTE - OBJECTIVE STATEMENT
Pt is a 79y/o M presenting to the ED w/ c/o of bleeding from stoma since 9am this morning, fatigue, SOB as per home nurse @ bedside. Pt w/ PMHx Crohn's (R-sided stoma in place), bradycardia, depression, HTN, afib, HLD, hypothyroid as per chart. -thinners. Dark red blood present in stoma pouch, sx resident @ bedside. R-sided PICC line in place for TPN ONLY. Pt UPGRADED to Dr. Greenberg d/t low BP reading in triage, Dr. Greenberg @ bedside. Pt denies dizziness/lightheadedness, N/V/D, abd pain, HA, blurry vision, numbness/tingling, fever/chills. Pt A/Ox3, speaking in clear/complete sentences. Respirations easy/even and unlabored on RA @ rest. Pt ambulates w/ walker @ baseline. Pt placed in gown, on continuous cardiac monitor and pulse ox. US IV placed, labs drawn.

## 2024-03-11 NOTE — H&P ADULT - PROBLEM SELECTOR PLAN 1
Acute blood loss anemia (Hb: 6.3), with components of demand ischemia (Trop 77) and ELVI (Cr. 1.62) - Bleeding from wound bed controlled. s/p 2 units pRBCs in ED.  - No acute surgical intervention per surgery  - Trend hemoglobin.   - Serial wound checks to assess for bleeding  - Repeat labs after blood transfusion  - 2 large bore IVs  - Transfuse to keep hgb >7  - SCDs. Can transition to Lovenox in AM if Hgb stable

## 2024-03-11 NOTE — ED PROVIDER NOTE - CONSTITUTIONAL, MLM
ill appearing, pale , slightly jaundice appearing normal... ill appearing, pale, slightly jaundice appearing

## 2024-03-11 NOTE — PATIENT PROFILE ADULT - FUNCTIONAL ASSESSMENT - BASIC MOBILITY 6.
3-calculated by average/Not able to assess (calculate score using [facility] averaging method)

## 2024-03-11 NOTE — ED ADULT NURSE REASSESSMENT NOTE - NS ED NURSE REASSESS COMMENT FT1
Pt transported to [facility] while continuing to receive blood transfusion. Pt on continuous cardiac monitor and pulse ox, remains free of S/S of transfusion rxn. 7La RENETTA Gross aware of continued transfusion, need for post-transfusion VS. Verbalized understanding.

## 2024-03-11 NOTE — PATIENT PROFILE ADULT - STATED REASON FOR ADMISSION
61yM obese DM CKD3 admitted in Oct 2019 for reconstruction of Charcot's foot, discharged w/ external hardware fixation and bactrim x3wk, returns now on podiatry's instructions for removal of hardware.  Pt reports recovering well, no sig pain in foot, no fevers, n/v/d, CP or SOB. Does admit to "pulled muscle in my groin" from moving funny and trying to drag foot/roll over in bed when foot weighed down w/ external hardware. Says he tried pain meds at home w/o relief. Bleeding from ileostomy

## 2024-03-11 NOTE — H&P ADULT - PROBLEM SELECTOR PLAN 8
F: s/p 1L NS and 2 units pRBC  E: replete K<4, Mg<2  N: regular/TPN  VTE Prophylaxis: SCDs   GI: None   C: Full Code  D: 7[place].

## 2024-03-11 NOTE — H&P ADULT - PROBLEM SELECTOR PLAN 5
Diagnosis of AFib/Flutter s/p DCCVs.   Home medication: amiodarone 200 mg QD, lopressor 50 mg BID PO, currently   - Continue amiodarone   - hold  beta blocker given active bleed. Resume as appropriate

## 2024-03-11 NOTE — CONSULT NOTE ADULT - ASSESSMENT
78M w PMH Crohn's, AFib/Flutter s/p DCCVs on amiodarone, remote ileocectomy and open appendectomy with prolonged stay at [facility] now re-admitted for anemia 2/2 blood loss from wound site being medically managed.       #anemia   No acute surgical intervention per surgery  Transfuse 2PRBC in ED, trend hemoglobin. Serial wound checks to assess for bleeding  Patient with elements of demand ischemia with elevated troponin and elevated Cr from discharge   -repeat labs after blood transfusion    #ELVI (acute kidney injury)  Most likely prerenal etiology given volume status and anemia. Also present on previous admission    Plan  - encourage PO intake  - monitor ostomy output      Dispo: 7LA  78M w PMH Crohn's, AFib/Flutter s/p DCCVs on amiodarone, remote ileocectomy and open appendectomy with prolonged stay at [facility] now re-admitted for anemia 2/2 blood loss from wound site being medically managed.       #anemia   No acute surgical intervention per surgery  Transfuse 2PRBC in ED, trend hemoglobin. Serial wound checks to assess for bleeding  Patient with elements of demand ischemia with elevated troponin and elevated Cr from discharge   -repeat labs after blood transfusion    #ELVI (acute kidney injury)  Most likely prerenal etiology given volume status and anemia. Also present on previous admission    Plan  -Repeat s/p PRBC   - encourage PO intake  - monitor ostomy output    #History of short bowel syndrome  History of Short bowel syndrome on last admission, high output w/ EC fistula on TPN at home. Gattex was initially held given biliary colic, however restarted once abdominal pain resolved  Plan  - c/w TPN  - c/w gattex  - c/w low fat, low fiber, high protein diet      #Afib.   Diagnosis of AFib/Flutter s/p PPM placed on prior admission. Home med lopressor 50 bid was continued during hospitalization.   Plan  - c/w home meds    #Hypothyroid.   home med: Synthroid 50 mcg QD was continued during hospitalization  Plan  -Continue home med.    #Depression, major.   home med: venlafaxine 150 mg BID was continued during hospitalization  Plan  -continue home med      Dispo: 7LA

## 2024-03-11 NOTE — CHART NOTE - NSCHARTNOTEFT_GEN_A_CORE
Clinically we were not able to identify a source of sepsis
Senior surgical resident addendum:    78M PMH crohn's, AFib/flutter s/p DCCV on amio, recently admitted 6/23/24 to 2/12/24 for severe crohn's flare s/p multiple extensive surgeries (see prior surgery notes) presents w/ 1d central abdominal pain (now resolved), episode of large volume emesis, and high output bile-tinged liquid from EC fistula with lethargy. Has not eaten since emesis episode. ECF has put out 2L since AM. Afeb, tachy 110s, SBP 94/62 on arrival improved w/ IVF. Abdo soft, ND, NT, R sided ileostomy ppp, left central EC fistula with high output watery green output, jaundiced appearing. WBC 14.8 w L shift, hgb 9.1, BUN/Cr 44/1.4 (c/w prior), TB 5 (4.4 last admission, known persistent TB elevation), AP/AST/ALT all mildly elevated c/w prior. C. diff negative. GI PCR negative. CT w/ removal of perc cony since November, GB distended, mild extrahepatic biliary ductal dilation w/ CBD to 1.3 cm, 2 cm low attenuation pancreatic body mass w/ ductal dilation, R > L pleural effusions. Admitted to medicine under Dr. Aguiar, recommend IV abx, percutaneous cholecystostomy, hold Gattex as it can cause/exacerbate biliary disease. Discussed w/ Dr. Peterson, will discuss w/ Dr. Knapp as well.    Nurys Dias, PGY-4  General Surgery
Attestation of CVC Checklist review      Type of CVC: PICC  Location of CVC: LUE    This patient was admitted with the above CVC.  I reviewed the CVC checklist and determined that the CVC can be maintained and OK to use*.     *If BCx was obtained upon admission and it becomes positive, then the CVC should be removed promptly.
Infectious Diseases Anti-infective Approval Note    Medication: Cefepime  Dose: 2g  Route: IV  Frequency: q12h via extended infusion over 4 hours  Duration**: 3 days  Purpose:  (check one)       Empiric pending cultures: x       Empiric, no culture data:       Final duration:    Dose may be adjusted as needed for alterations in renal function.    *THIS IS NOT AN INFECTIOUS DISEASES CONSULTATION*    **Indicates duration of approval, not necessarily duration of treatment

## 2024-03-11 NOTE — CONSULT NOTE ADULT - SUBJECTIVE AND OBJECTIVE BOX
SURGERY CONSULT  ==============================================================================================================  HPI: 78M w PMH Crohn's, AFib/Flutter s/p DCCVs on amiodarone, remote ileocectomy and open appendectomy. Admitted () for SBO vs Crohns flare, s/p NGT decompression and s/p lap converted to open TRE, SBR x 3, left in discontinuity with abthera vac on (), RTOR for ileocolic resection, small bowel anastomosis, and abdominal wall closure on (), c/b fluid collection s/p IR aspiration of perihepatic fluid on (7/3), c/b wound dehiscence s/p RTOR exlap, washout, ileocolic resection with end ileostomy, blow hole colostomy, red rubber from ileostomy to small bowel anastomosis; vicryl bridging mesh on () transferred to SICU postoperatively for hemodynamic monitoring, with hospital course complicated by periods of severe ELVI and hyponatremia, which resolved but stepped back up for to SICU on (9/10) for acute AMS, intermittent hypoglycemia, AFib with RVR. Percutaneous Cholecystostomy placed on () for enlarged GB, PCT capped 10/5 and uncapped 10/25 for hyperbilirubinemia, Right brachial DVT, left basillic and cephalic superficial thrombus on duplex , CT  with ALDEN ground glass opacity of unclear etiology, completed empiric 7day cefepime course , and another course of 7day cefepime for PNA  (1/15-), hyperbilirubinemia of unclear etiology, resolved candida glabrata fungemia, ELVI resolved, second episode of sinus pause--pacemaker placed (). cystic duct open, Perc cony removed . Discharged home 24. Recently presented to ED twice for anemia requiring PRBC transfusion and dischgarged. Readmitted on 24 for generalized weakness, decreased PO intake, nbnb vomiting, nausea and increased stoma output with concerns for sepsis, with cholecystitis high in differential, treated with cefepime 2g for 3 days and discharged on 3/3/24. Now presents with bleeding from wound bed (750cc according to home nurse) and fatigue. Denies any fevers, chills, headache, chest pain, palpitations, cough, runny nose, abdominal pain, nausea, vomiting, changes in EC fistula output, urinary symptoms.     In the ED patient seen and appreciated somnolent, mildly tachycardic to 105-110, hypotensive to 79/50 in triage room, saturating appropriately on RA, afebrile     On exam, patient with improving jaundice (compared to previous exams), AOx3 but somnolent.   Abdomen soft, non distended, non tender to palpation on all four quadrants. Midline wound manager with large amount of blood (150cc evacuated with clots). Wound cleansed and two bleeding points from wound bed identified and cauterized with silver nitrate. Healthy granulation tissue throughout. EC fistula with green, thick output.     Labs significant for Hb 6.3, WBC 13, , Cr. 1.62, BUN 40, INR: 1.31, PT 14.8, PTT 32.5, T bili 4.2, Dbili: 2.7, IDbili, 1.4, ALkPhos: 129 ALT: 101 AST: 182    UA: wnl     In ED 1L NS bolus along with transfusion of 2PRBC, surgery team along with wound care team with successful attempt at controlling wound bed bleeding     PAST MEDICAL & SURGICAL HISTORY:  Essential hypertension  Hypertension  Atrial fibrillation  s/p cardioversion  and   Pt. reports 4 DCCV  Now on Amiodarone 200mg PO bid and Eliquis 5mg PO bid  Last DCCV 4 yrs ago at [facility]  Crohn's disease  s/p partial resection of ileum  Hyperlipidemia  Hypothyroidism  History of depression  On Venlafaxine ER 150mg PO bid  Junctional rhythm  Bradycardia  H/O knee surgery  History of cataract surgery    Home Meds: Home Medications:  allopurinol 300 mg oral tablet: 1 tab(s) orally once a day (2024 22:11)  Ambien 10 mg oral tablet: 1 tab(s) orally once a day (at bedtime) (2024 22:38)  metoprolol tartrate 50 mg oral tablet: 1 tab(s) orally 2 times a day (03 Mar 2024 14:40)  rosuvastatin 5 mg oral tablet: 1 tab(s) orally once a day (2024 22:11)  teduglutide 5 mg subcutaneous kit: 0.05 mg/kg subcutaneously once a day (2024 22:38)  venlafaxine 150 mg oral tablet, extended release: 1 tab(s) orally 2 times a day (2024 22:11)    Allergies: Allergies    penicillin (Angioedema)    Intolerances      Soc:   Advanced Directives: Presumed Full Code     CURRENT MEDICATIONS:   --------------------------------------------------------------------------------------  Neurologic Medications    Respiratory Medications    Cardiovascular Medications    Gastrointestinal Medications    Genitourinary Medications    Hematologic/Oncologic Medications    Antimicrobial/Immunologic Medications    Endocrine/Metabolic Medications    Topical/Other Medications    --------------------------------------------------------------------------------------    VITAL SIGNS, INS/OUTS (last 24 hours):  --------------------------------------------------------------------------------------  ICU Vital Signs Last 24 Hrs  T(C): 36.3 (11 Mar 2024 19:50), Max: 36.4 (11 Mar 2024 15:08)  T(F): 97.4 (11 Mar 2024 19:50), Max: 97.6 (11 Mar 2024 17:49)  HR: 92 (11 Mar 2024 19:50) (92 - 106)  BP: 111/54 (11 Mar 2024 19:50) (79/47 - 121/65)  BP(mean): --  ABP: --  ABP(mean): --  RR: 19 (11 Mar 2024 19:50) (16 - 19)  SpO2: 98% (11 Mar 2024 19:50) (96% - 100%)    O2 Parameters below as of 11 Mar 2024 19:50  Patient On (Oxygen Delivery Method): room air          I&O's Summary    --------------------------------------------------------------------------------------    EXAM:  CONSTITUTIONAL: Awake, alert.  Nontoxic, no acute distress, somnolent. pale , improved jaundice   HEAD: Normocephalic, atraumatic.  EYES: Conjunctivae clear without exudates or hemorrhage. Sclera is non-icteric.  ENT: Normal appearing external ears, nose, mucous membranes moist.  NECK: supple, trachea midline.  HEART:  Normal rate, regular rhythm.    LUNGS:  No acute respiratory distress.  Non-tachypneic and non-labored.  ABDOMEN: soft, non distended, non tender to palpation on all four quadrants. Midline wound manager with large amount of blood (150cc evacuated with clots). Wound cleansed and two bleeding points from wound bed identified and cauterized with silver nitrate. Healthy granulation tissue throughout. EC fistula with green, thick output.   MUSCULOSKELETAL:  Moving all extremities without issue.  SKIN: Skin in warm, dry and intact without rashes or lesions.  Appropriate color for ethnicity.  NEUROLOGICAL:  Patient is alert, oriented x person, place and time.  PSYCH: Appropriate mood and affect. Good judgment and insight.    LABS  --------------------------------------------------------------------------------------  Labs:  CAPILLARY BLOOD GLUCOSE                              6.3    13.81 )-----------( 237      ( 11 Mar 2024 13:33 )             20.4       Auto Neutrophil %: 78.1 % (24 @ 13:33)  Auto Immature Granulocyte %: 0.4 % (24 @ 13:33)        134<L>  |  101  |  40<H>  ----------------------------<  135<H>  4.4   |  26  |  1.62<H>      Calcium: 7.8 mg/dL (24 @ 13:33)      LFTs:             8.0  | 4.2  | 182      ------------------[129     ( 11 Mar 2024 13:33 )  2.4  | 2.7  | 101         Lipase:45     Amylase:x         Lactate, Blood: 1.7 mmol/L (24 @ 13:33)      Coags:     14.8   ----< 1.31    ( 11 Mar 2024 13:33 )     32.5        CARDIAC MARKERS ( 11 Mar 2024 13:33 )  x     / x     / 39 U/L / x     / 2.3 ng/mL    Urinalysis Basic - ( 11 Mar 2024 15:51 )    Color: Dark Yellow / Appearance: Clear / S.019 / pH: x  Gluc: x / Ketone: Negative mg/dL  / Bili: Moderate / Urobili: 1.0 mg/dL   Blood: x / Protein: Trace mg/dL / Nitrite: Negative   Leuk Esterase: Trace / RBC: 7 /HPF / WBC 5 /HPF   Sq Epi: x / Non Sq Epi: 4 /HPF / Bacteria: Negative /HPF  --------------------------------------------------------------------------------------    OTHER LABS    IMAGING RESULTS  ****************      ASSESSMENT/ PLAN:  78M w PMH Crohn's, AFib/Flutter s/p DCCVs on amiodarone, remote ileocectomy and open appendectomy. Admitted () for SBO vs Crohns flare, s/p NGT decompression and s/p lap converted to open TRE, SBR x 3, left in discontinuity with abthera vac on (), RTOR for ileocolic resection, small bowel anastomosis, and abdominal wall closure on (), c/b fluid collection s/p IR aspiration of perihepatic fluid on (7/3), c/b wound dehiscence s/p RTOR exlap, washout, ileocolic resection with end ileostomy, blow hole colostomy, red rubber from ileostomy to small bowel anastomosis; vicryl bridging mesh on () transferred to SICU postoperatively for hemodynamic monitoring, with hospital course complicated by periods of severe ELVI and hyponatremia, which resolved but stepped back up for to SICU on (9/10) for acute AMS, intermittent hypoglycemia, AFib with RVR. Percutaneous Cholecystostomy placed on () for enlarged GB, PCT capped 10/5 and uncapped 10/25 for hyperbilirubinemia, Right brachial DVT, left basillic and cephalic superficial thrombus on duplex , CT  with ALDEN ground glass opacity of unclear etiology, completed empiric 7day cefepime course , and another course of 7day cefepime for PNA  (1/15-23), hyperbilirubinemia of unclear etiology, resolved candida glabrata fungemia, ELVI resolved, second episode of sinus pause--pacemaker placed (). cystic duct open, Perc cony removed . Discharged home 24. Recently presented to ED twice for anemia requiring PRBC transfusion and dischgarged. Readmitted on 24 for generalized weakness, decreased PO intake, nbnb vomiting, nausea and increased stoma output with concerns for sepsis, with cholecystitis high in differential, treated with cefepime 2g for 3 days and discharged on 3/3/24. Now presents with bleeding from wound bed (750cc according to home nurse) and fatigue.    Acute blood loss anemia (Hb: 6.3), with components of demand ischemia (Trop 77) and ELVI (Cr. 1.62) - Bleeding from wound bed controlled bedside this AM in ED. No further bleeding of serial wound check in afternoon.  Wound care team changed wound manager with success - continue to monitor outputs and replete as necessary   Chemistries grossly unchanged from his past admission-     Plan     No acute surgical intervention   Transfuse 2PRBC in ED, trend hemoglobin. Serial wound checks to assess for bleeding- please contact surgery team if any acute bleeding from wound bed overnight  Dispo arranged with Dr. Aguiar who recommends admission to monitored telemetry bed   Please call team 5c if any further questions or concerns we will keep following  Attending aware and agrees with plan
HPI: 78M w PMH Crohn's, AFib/Flutter s/p DCCVs on amiodarone, remote ileocectomy and open appendectomy. Admitted () for SBO vs Crohns flare, s/p NGT decompression and s/p lap converted to open TRE, SBR x 3, left in discontinuity with abthera vac on (), RTOR for ileocolic resection, small bowel anastomosis, and abdominal wall closure on (), c/b fluid collection s/p IR aspiration of perihepatic fluid on (7/3), c/b wound dehiscence s/p RTOR exlap, washout, ileocolic resection with end ileostomy, blow hole colostomy, red rubber from ileostomy to small bowel anastomosis; vicryl bridging mesh on () transferred to SICU postoperatively for hemodynamic monitoring, with hospital course complicated by periods of severe ELVI and hyponatremia, which resolved but stepped back up for to SICU on (9/10) for acute AMS, intermittent hypoglycemia, AFib with RVR. Percutaneous Cholecystostomy placed on () for enlarged GB, PCT capped 10/5 and uncapped 10/25 for hyperbilirubinemia, Right brachial DVT, left basillic and cephalic superficial thrombus on duplex , CT  with ALDEN ground glass opacity of unclear etiology, completed empiric 7day cefepime course , and another course of 7day cefepime for PNA  (1/15-23), hyperbilirubinemia of unclear etiology, resolved candida glabrata fungemia, ELVI resolved, second episode of sinus pause--pacemaker placed (). cystic duct open, Perc cony removed . Discharged home 24. Recently presented to ED twice for anemia requiring PRBC transfusion and dischgarged. Readmitted on 24 for generalized weakness, decreased PO intake, nbnb vomiting, nausea and increased stoma output with concerns for sepsis, with cholecystitis high in differential, treated with cefepime 2g for 3 days and discharged on 3/3/24. Now presents with bleeding from wound bed (750cc according to home nurse) and fatigue. Denies any fevers, chills, headache, chest pain, palpitations, cough, runny nose, abdominal pain, nausea, vomiting, changes in EC fistula output, urinary symptoms.     In the ED patient seen and appreciated, mildly tachycardic to 105-110, otherwise without complaints at this time.     Abdomen soft, non distended, non tender to palpation on all four quadrants.  Healthy granulation tissue throughout. EC fistula with green, thick output.     Labs significant for Hb 6.3, WBC 13, , Cr. 1.62, BUN 40, INR: 1.31, PT 14.8, PTT 32.5, T bili 4.2, Dbili: 2.7, IDbili, 1.4, ALkPhos: 129 ALT: 101 AST: 182    UA: wnl     In ED 1L NS bolus along with transfusion of 2PRBC, seen by surgery in ED and rec admitted to telemetry bed and medical management of anemia       REVIEW OF SYSTEMS:   Otherwise negative except as specified in HPI    PAST MEDICAL HISTORY:     PAST SURGICAL HISTORY:    FAMILY HISTORY:    SOCIAL HISTORY:  Tobacco use:  EtOH use:  Illicit drug use:    MEDICATIONS:  MEDICATIONS  (STANDING):    MEDICATIONS  (PRN):      ALLERGIES:  Allergies    penicillin (Angioedema)    Intolerances        VITAL SIGNS:  Vital Signs Last 24 Hrs  T(C): 36.4 (11 Mar 2024 20:15), Max: 36.4 (11 Mar 2024 15:08)  T(F): 97.6 (11 Mar 2024 20:15), Max: 97.6 (11 Mar 2024 17:49)  HR: 97 (11 Mar 2024 20:15) (92 - 106)  BP: 115/60 (11 Mar 2024 20:15) (79/47 - 121/65)  BP(mean): --  RR: 18 (11 Mar 2024 20:15) (16 - 19)  SpO2: 100% (11 Mar 2024 20:15) (96% - 100%)    Parameters below as of 11 Mar 2024 20:15  Patient On (Oxygen Delivery Method): room air      PE:  CONSTITUTIONAL: Awake, alert.  Nontoxic, no acute distress  HEAD: Normocephalic, atraumatic.  EYES: Conjunctivae clear without exudates or hemorrhage. Sclera is non-icteric.  ENT: Normal appearing external ears, nose, mucous membranes moist.  NECK: supple, trachea midline.  HEART:  Normal rate, regular rhythm.    LUNGS:  No acute respiratory distress.  Non-tachypneic and non-labored.  ABDOMEN: soft, non distended, non tender to palpation on all four quadrants. EC fistula with green, thick output.   MUSCULOSKELETAL:  Moving all extremities without issue.  SKIN: Skin in warm, dry and intact without rashes or lesions.  Appropriate color for ethnicity.  NEUROLOGICAL:  Patient is alert, oriented x person, place and time.  PSYCH: Appropriate mood and affect. Good judgment and insight.      LABS:                        6.3    13.81 )-----------( 237      ( 11 Mar 2024 13:33 )             20.4     03-11    134<L>  |  101  |  40<H>  ----------------------------<  135<H>  4.4   |  26  |  1.62<H>    Ca    7.8<L>      11 Mar 2024 13:33  Mg     2.2     03-11    TPro  8.0  /  Alb  2.4<L>  /  TBili  4.2<H>  /  DBili  2.7<H>  /  AST  182<H>  /  ALT  101<H>  /  AlkPhos  129<H>  03-11    PT/INR - ( 11 Mar 2024 13:33 )   PT: 14.8 sec;   INR: 1.31          PTT - ( 11 Mar 2024 13:33 )  PTT:32.5 sec  Urinalysis Basic - ( 11 Mar 2024 15:51 )    Color: Dark Yellow / Appearance: Clear / S.019 / pH: x  Gluc: x / Ketone: Negative mg/dL  / Bili: Moderate / Urobili: 1.0 mg/dL   Blood: x / Protein: Trace mg/dL / Nitrite: Negative   Leuk Esterase: Trace / RBC: 7 /HPF / WBC 5 /HPF   Sq Epi: x / Non Sq Epi: 4 /HPF / Bacteria: Negative /HPF      CARDIAC MARKERS ( 11 Mar 2024 13:33 )  x     / x     / 39 U/L / x     / 2.3 ng/mL      CAPILLARY BLOOD GLUCOSE              RADIOLOGY & ADDITIONAL TESTS: Reviewed.

## 2024-03-11 NOTE — H&P ADULT - ATTENDING COMMENTS
78M with AFib/Flutter s/p DCCVs on amiodarone, Crohn's, ileocectomy, appendectomy, long admission (6/23) for SBO vs Crohns flare, treated with decompression, lap converted to open TRE, SBR x 3, left in discontinuity with abthera vac on (6/27), ileocolic resection, small bowel anastomosis, and abdominal wall closure on (6/28), c/b fluid collection s/p IR aspiration of perihepatic fluid on (7/3), c/b wound dehiscence s/p exlap, washout, ileocolic resection with end ileostomy, blow hole colostomy, red rubber from ileostomy to small bowel anastomosis; Vicryl bridging mesh on (7/5), s/p percutaneous cholecystectomy and multiple complications presents with anemia, bleeding from abdominal wound, and ELVI. Physical exam as above. CXR showed BL pleural effusions (present in the past).   1. Anemia  2. ELVI  3. Abdominal wound  4. Chron's  5. Pancreatic duct IPMN's?  - transfuse PRBC  - iv fluids  - may need wound granulation tissue fulguration for bleeding control  - cont TPN

## 2024-03-11 NOTE — H&P ADULT - PROBLEM SELECTOR PLAN 4
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

## 2024-03-11 NOTE — H&P ADULT - HISTORY OF PRESENT ILLNESS
78M w PMH Crohn's, AFib/Flutter s/p DCCVs on amiodarone, remote ileocectomy and open appendectomy. Admitted (6/23) for SBO vs Crohns flare, s/p NGT decompression and s/p lap converted to open TRE, SBR x 3, left in discontinuity with abthera vac on (6/27), RTOR for ileocolic resection, small bowel anastomosis, and abdominal wall closure on (6/28), c/b fluid collection s/p IR aspiration of perihepatic fluid on (7/3), c/b wound dehiscence s/p RTOR exlap, washout, ileocolic resection  (7/5) transferred to SICU postoperatively for hemodynamic monitoring, with hospital course complicated by periods of severe ELVI and hyponatremia, which resolved but stepped back up for to SICU on (9/10) for acute AMS, intermittent hypoglycemia, AFib with RVR. Percutaneous Cholecystostomy placed on (9/11) for enlarged GB, PCT capped 10/5 and uncapped 10/25 for hyperbilirubinemia, Right brachial DVT, left basillic and cephalic superficial thrombus on duplex 11/2, CT 11/14 with ALDEN ground glass opacity of unclear etiology, completed empiric 7day cefepime course 11/22, and another course of 7day cefepime for PNA  (1/15-23), hyperbilirubinemia of unclear etiology, resolved candida glabrata fungemia, ELVI resolved, second episode of sinus pause--pacemaker placed (2/5). cystic duct open, Perc cony removed 2/8. Discharged home 2/12/24. Recently presented to ED twice for anemia requiring PRBC transfusion and dischgarged. Readmitted on 2/28/24 for generalized weakness, decreased PO intake, nbnb vomiting, nausea and increased stoma output with concerns for sepsis, with cholecystitis high in differential, treated with cefepime 2g for 3 days and discharged on 3/3/24. Now presents with bleeding from wound bed (750cc according to home nurse) and fatigue. Denies any fevers, chills, headache, chest pain, palpitations, cough, runny nose, abdominal pain, nausea, vomiting, changes in EC fistula output, urinary symptoms.     ED Course:  Vitals: 97.3 F, , BP 79.47/, RR 18 (98%) on RA  Labs: Hb 6.3, WBC 13, , Cr. 1.62, BUN 40, INR: 1.31, PT 14.8, PTT 32.5, T bili 4.2, Dbili: 2.7, IDbili, 1.4, ALkPhos: 129 ALT: 101 AST: 182  Consults: Surgery; Wound care  Interventions: 1L NS; 2 units pRBCs

## 2024-03-11 NOTE — H&P ADULT - NSHPPHYSICALEXAM_GEN_ALL_CORE
T(C): 36.4 (03-11-24 @ 20:15), Max: 36.4 (03-11-24 @ 15:08)  HR: 97 (03-11-24 @ 22:20) (92 - 106)  BP: 120/57 (03-11-24 @ 22:20) (79/47 - 121/65)  RR: 16 (03-11-24 @ 22:20) (16 - 19)  SpO2: 96% (03-11-24 @ 22:20) (96% - 100%)    CONSTITUTIONAL: Awake, alert, somnolent, pale , improved jaundice   HEAD: Normocephalic, atraumatic.  EYES: Conjunctivae clear without exudates or hemorrhage. Sclera is non-icteric.  ENT: Normal appearing external ears, nose, mucous membranes moist.  NECK: supple, trachea midline.  HEART:  RRR, No murmurs, rubs or gallops  LUNGS:  CLTA b/l, no wheezing  ABDOMEN: soft, non distended, non tender to palpation on all four quadrants, ileostomy with dark blood filling bag/ no clots / no stool  EC fistula with green, thick output.   SKIN: Skin in warm, dry and intact without rashes or lesions.  Appropriate color for ethnicity.  NEUROLOGICAL:  Patient is alert, oriented x person, place and time.  PSYCH: Alert and oriented to person, place, time/situation. normal mood and affect. no apparent risk to self or others.

## 2024-03-11 NOTE — ED ADULT NURSE NOTE - NSICDXPASTMEDICALHX_GEN_ALL_CORE_FT
PAST MEDICAL HISTORY:  Atrial fibrillation s/p cardioversion 2010 and 2014  Pt. reports 4 DCCV  Now on Amiodarone 200mg PO bid and Eliquis 5mg PO bid  Last DCCV [facility] yrs ago at [facility]    Bradycardia     Crohn's disease s/p partial resection of ileum    Essential hypertension Hypertension    History of depression On Venlafaxine ER 150mg PO bid    Hyperlipidemia     Hypothyroidism     Junctional rhythm

## 2024-03-11 NOTE — ED ADULT NURSE NOTE - NS ED NURSE REPORT GIVEN DT
GI Follow up Note  4/9/2022        CHIEF COMPLAINT:    Chief Complaint   Patient presents with   • Abdominal Pain   • Fever 9 Weeks to 74 years   • Vomiting       SUBJECTIVE:      No abdominal pain  Noted nausea yesterday, none today  Stools formed overnight  Tearful, scared     PROBLEM LIST:                  Patient Active Problem List   Diagnosis   • Dorado's esophagus   • Chronic back pain   • Depression with anxiety   • H/O colonoscopy   • Hypothyroidism   • Iron deficiency anemia   • Lumbosacral spondylosis with radiculopathy   • Migraine headache   • Low serum calcium   • PTSD (post-traumatic stress disorder)   • Chronic, continuous use of opioids   • S/P Nissen fundoplication (without gastrostomy tube) procedure   • Chronic pain of both knees   • Urinary incontinence   • Irritable bowel syndrome   • Absence of both cervix and uterus, acquired   • Seasonal allergic rhinitis   • Hiatal hernia   • Leukocytosis   • Nausea and vomiting   • Abnormal CT of the abdomen   • Transaminitis       ALLERGIES:   Allergen Reactions   • Calcium Acetate Other (See Comments)   • Clarithromycin Other (See Comments)   • Seasonal Other (See Comments)     congestion         INPATIENT MEDICATIONS:  Current Facility-Administered Medications   Medication Dose Route Frequency Provider Last Rate Last Admin   • butalbital-APAP-caffeine (FIORICET) -40 MG tablet 1 tablet  1 tablet Oral Q4H PRN Nura Samson MD   1 tablet at 04/09/22 0916   • HYDROcodone-acetaminophen (NORCO)  MG per tablet 1 tablet  1 tablet Oral Q6H PRN Nura Samson MD   1 tablet at 04/09/22 1140   • cholestyramine/aspartame (QUESTRAN LIGHT) packet 4 g  4 g Oral Daily Theresa Pineda CNP   4 g at 04/09/22 0557   • loperamide (IMODIUM) capsule 2 mg  2 mg Oral PRN Theresa Pineda CNP   2 mg at 04/07/22 2112   • dicyclomine (BENTYL) capsule 20 mg  20 mg Oral 4x Daily AC & HS Angela De MD   20 mg at 04/09/22 1138   • levothyroxine  (SYNTHROID, LEVOTHROID) tablet 175 mcg  175 mcg Oral QAM AC Bernice Watkins MD   175 mcg at 04/09/22 0600   • Home Medication kept in pharmacy  1 Bag Oral PRN Nura Samson MD       • pantoprazole (PROTONIX INJECT) injection 40 mg  40 mg Intravenous 2 times per day Behzad Cool MD   40 mg at 04/09/22 0851   • Potassium Standard Replacement Protocol   Does not apply See Admin Instructions Bernice Watkins MD       • Magnesium Standard Replacement Protocol   Does not apply See Admin Instructions Bernice Watkins MD       • ondansetron (ZOFRAN) injection 4 mg  4 mg Intravenous BID PRN Bernice Watkins MD   4 mg at 04/08/22 2015   • prochlorperazine (COMPAZINE) injection 5 mg  5 mg Intravenous Q4H PRN Bernice Watkins MD   5 mg at 04/08/22 1241   • acetaminophen (TYLENOL) tablet 650 mg  650 mg Oral Q4H PRN Bernice Watkins MD       • polyethylene glycol (MIRALAX) packet 17 g  17 g Oral Daily PRN Bernice Watkins MD       • docusate sodium-sennosides (SENOKOT S) 50-8.6 MG 2 tablet  2 tablet Oral Daily PRN Bernice Watkins MD       • sodium chloride 0.9 % flush bag 25 mL  25 mL Intravenous PRN Bernice Watkins MD       • sodium chloride 0.9 % flush bag 25 mL  25 mL Intravenous PRN Bernice Watkins MD       • sodium chloride (PF) 0.9 % injection 2 mL  2 mL Intracatheter 2 times per day Bernice Watkins MD   2 mL at 04/09/22 0851   • sodium chloride 0.9% infusion   Intravenous Continuous Bernice Watkins MD 75 mL/hr at 04/09/22 1126 New Bag at 04/09/22 1126   • albuterol inhaler 2 puff  2 puff Inhalation Q4H Resp PRN Bernice Watkins MD       • atorvastatin (LIPITOR) tablet 10 mg  10 mg Oral Daily Bernice Watkins MD       • citalopram (CeleXA) tablet 40 mg  40 mg Oral Daily Bernice Watkins MD   40 mg at 04/09/22 0851   • morphine injection 1 mg  1 mg Intravenous Q4H PRN Bernice Watkins MD   1 mg at 04/08/22 0134   • morphine injection 2 mg  2 mg Intravenous Q4H PRN Bernice Watkins MD   2 mg at 04/08/22 1243   • ALPRAZolam (XANAX) tablet 1 mg  1 mg Oral BID PRN Bernice Watkins MD    1 mg at 04/08/22 0572         PHYSICAL EXAM:    Vital Signs: Blood pressure (!) 150/94, pulse (!) 55, temperature 98.1 °F (36.7 °C), temperature source Oral, resp. rate 16, height 5' 7\" (1.702 m), weight 103.2 kg (227 lb 8.2 oz), SpO2 96 %.    Physical Exam  Vitals reviewed.   Constitutional:       Appearance: Normal appearance.   Cardiovascular:      Pulses: Normal pulses.   Pulmonary:      Effort: Pulmonary effort is normal. No respiratory distress.   Abdominal:      General: Bowel sounds are normal.      Palpations: Abdomen is soft.      Tenderness: There is no abdominal tenderness.   Musculoskeletal:         General: Normal range of motion.      Cervical back: Normal range of motion.   Skin:     General: Skin is warm and dry.   Neurological:      Mental Status: She is alert and oriented to person, place, and time.   Psychiatric:         Mood and Affect: Affect is tearful.         Thought Content: Thought content normal.         Judgment: Judgment normal.         LABORATORY DATA and Imaging:   Recent Results (from the past 24 hour(s))   Potassium    Collection Time: 04/08/22  5:27 PM   Result Value Ref Range    Potassium 4.3 3.4 - 5.1 mmol/L   Basic Metabolic Panel    Collection Time: 04/09/22  4:50 AM   Result Value Ref Range    Fasting Status      Sodium 138 135 - 145 mmol/L    Potassium 4.1 3.4 - 5.1 mmol/L    Chloride 104 98 - 107 mmol/L    Carbon Dioxide 27 21 - 32 mmol/L    Anion Gap 11 10 - 20 mmol/L    Glucose 85 70 - 99 mg/dL    BUN 7 6 - 20 mg/dL    Creatinine 0.87 0.51 - 0.95 mg/dL    Glomerular Filtration Rate 74 >=60    BUN/ Creatinine Ratio 8 7 - 25    Calcium 6.7 (L) 8.4 - 10.2 mg/dL   CBC with Automated Differential (performable only)    Collection Time: 04/09/22  4:50 AM   Result Value Ref Range    WBC 24.2 (H) 4.2 - 11.0 K/mcL    RBC 3.91 (L) 4.00 - 5.20 mil/mcL    HGB 12.4 12.0 - 15.5 g/dL    HCT 37.5 36.0 - 46.5 %    MCV 95.9 78.0 - 100.0 fl    MCH 31.7 26.0 - 34.0 pg    MCHC 33.1 32.0 - 36.5  g/dL    RDW-CV 14.6 11.0 - 15.0 %    RDW-SD 50.0 39.0 - 50.0 fL    PLT 78 (L) 140 - 450 K/mcL    NRBC 0 <=0 /100 WBC    Neutrophil, Percent 4 %    Lymphocytes, Percent 15 %    Mono, Percent 2 %    Eosinophils, Percent 78 %    Basophils, Percent 0 %    Immature Granulocytes 1 %    Absolute Neutrophils 1.1 (L) 1.8 - 7.7 K/mcL    Absolute Lymphocytes 3.7 1.0 - 4.0 K/mcL    Absolute Monocytes 0.6 0.3 - 0.9 K/mcL    Absolute Eosinophils  18.6 (H) 0.0 - 0.5 K/mcL    Absolute Basophils 0.1 0.0 - 0.3 K/mcL    Absolute Immmature Granulocytes 0.1 0.0 - 0.2 K/mcL         CT NECK SOFT TISSUE W CONTRAST   Final Result      1. No mass or lymphadenopathy identified in the neck.       2. Incidental findings include severe chronic paranasal sinus disease, post   thyroidectomy, and enlarged lingual tonsils.      Electronically Signed by: LALA BORRERO MD    Signed on: 4/6/2022 9:58 AM          CT CHEST W CONTRAST   Final Result      1.    No evidence of malignancy in the chest.   2.    Tree-in-bud nodularity in the left lower lobe concerning for an   infectious and/or inflammatory bronchiolitis, possibly due to aspiration as   there is areas of mucus impaction within the lower lobe bronchi.     3.   Large hiatal hernia.            Electronically Signed by: JESUS BOOTH MD    Signed on: 4/6/2022 10:27 AM          Endoscopic ultrasonography (Upper)   Final Result      XR CHEST PA OR AP 1 VIEW   Final Result   No acute disease      Electronically Signed by: SHWETA DOSS MD    Signed on: 4/5/2022 12:54 AM          US ABDOMEN LIMITED   Final Result      The common duct caliber is within normal limits when accounting for   reservoir effect from postcholecystectomy status.  Low level echoes in the   common duct are nonspecific but may reflect sludge.       Electronically Signed by: NAREN WHITT M.D.    Signed on: 4/4/2022 5:29 PM          EGD    (Results Pending)   ERCP    (Results Pending)   IR BONE MARROW BIOPSY(IES) AND ASPIRATION(S)     (Results Pending)   IR PROCEDURE REQUEST    (Results Pending)           ASSESSMENT/PLAN:         58F with h/o gerd, nissen fundoplication, hypothryroidism, anxiety, depression presented with epigastric pain, nv, diarrhea for the past month.  Was seen in GI clinic with dr frankel yesterday and she sent her to the ED due to her inability to tolerate oral intake. Had US abdomen showing the common duct caliber is within normal limits when accounting for reservoir effect from postcholecystectomy status.  Low level echoes in the common duct are nonspecific but may reflect sludge.       Was seen in ED 4-2 and had ctap showing prominent epigastric elia/appy, moderate hh with intrathoracic herniation of the gastric fundus.  Had a nissen funduplication with Dr. Vasques several years ago.  Had cdiff in 2020.  MRCP 4/2020-intrahepatic ducts \"upper normal in calibur\" cbd 10 mm, no choledocholithiasis.     ERCP 5/2020-Dr. Partida, per his consult note-minimal ampullary stenosis, s/p sphincterotomy, balloon sweep, fluoroscopic image report noted intra and extra hepatic ductal dilation without definitive lesion      EGD/EUS 4/2020-Dr. Walker, gastric bx reactive gastropathy, focal erosion H pylori negative, \"question of microlithiasis\"     VCE 2019-prominent lymphoid tissue     EGD-2017 path \"suggestive of\" Dorado's esophagus, egd in 1 year recommended     [id number] cm adenomatous polyp, diverticulosis, hemorrhoids, colonoscopy in 3 years recommended     - here with nv, pain and inability to tolerate po (mainly solids)   No nausea today, stools are formed  - imaging shows dilated bile ducts with sludge   - bilirubin, ast/alt normal, ap chronically elevated   - +leukocytosis stable today - not clear why, heme on board, thought ?reactive  - diarrhea for a week formed stools today - stool cx neg, cdiff neg  - continue imodium prn and questran daily   - h/o hh with nissen fundoplication - may need to see surgery in clinic  to discuss, though on eus it's not that big  - eus this admission - barretts esophagus, 4cm hh, gastritis, cbd 8mm no stones, 2x1cm eloy hepatis lymph node biopsied  - diet as tolerated  - ppi bid - may need indefinitely if path +barretts  - likely a functional component to her symptoms as well  - eosinophilia may be r/t to resolving acute viral infection, may also be etiology of gi symptoms  - okay for dc from gi standpoint when symptoms controlled - f/u with dr frankel in a couple weeks      Thank you for involving me in the care of this patient.  Please call with further questions.     Sadie Cox, CNP     11-Mar-2024 23:02

## 2024-03-11 NOTE — ED PROVIDER NOTE - NSICDXPASTMEDICALHX_GEN_ALL_CORE_FT
PAST MEDICAL HISTORY:  Atrial fibrillation s/p cardioversion 2010 and 2014  Pt. reports 4 DCCV  Now on Amiodarone 200mg PO bid and Eliquis 5mg PO bid  Last DCCV 4 yrs ago at [facility]    Bradycardia     Crohn's disease s/p partial resection of ileum    Essential hypertension Hypertension    History of depression On Venlafaxine ER 150mg PO bid    Hyperlipidemia     Hypothyroidism     Junctional rhythm

## 2024-03-11 NOTE — PATIENT PROFILE ADULT - FALL HARM RISK - HARM RISK INTERVENTIONS

## 2024-03-11 NOTE — H&P ADULT - ASSESSMENT
78M w PMH Crohn's, AFib/Flutter s/p DCCVs on amiodarone, remote ileocectomy and open appendectomy with prolonged stay at [facility] now re-admitted for anemia 2/2 blood loss from wound site being medically managed.

## 2024-03-11 NOTE — H&P ADULT - NSHPLABSRESULTS_GEN_ALL_CORE
LABS:                         6.3    13.81 )-----------( 237      ( 11 Mar 2024 13:33 )             20.4         134<L>  |  101  |  40<H>  ----------------------------<  135<H>  4.4   |  26  |  1.62<H>    Ca    7.8<L>      11 Mar 2024 13:33  Mg     2.2         TPro  8.0  /  Alb  2.4<L>  /  TBili  4.2<H>  /  DBili  2.7<H>  /  AST  182<H>  /  ALT  101<H>  /  AlkPhos  129<H>      PT/INR - ( 11 Mar 2024 13:33 )   PT: 14.8 sec;   INR: 1.31          PTT - ( 11 Mar 2024 13:33 )  PTT:32.5 sec  Urinalysis Basic - ( 11 Mar 2024 15:51 )    Color: Dark Yellow / Appearance: Clear / S.019 / pH: x  Gluc: x / Ketone: Negative mg/dL  / Bili: Moderate / Urobili: 1.0 mg/dL   Blood: x / Protein: Trace mg/dL / Nitrite: Negative   Leuk Esterase: Trace / RBC: 7 /HPF / WBC 5 /HPF   Sq Epi: x / Non Sq Epi: 4 /HPF / Bacteria: Negative /HPF      CARDIAC MARKERS ( 11 Mar 2024 13:33 )  x     / x     / 39 U/L / x     / 2.3 ng/mL        Lactate, Blood: 1.7 mmol/L ( @ 13:33)      RADIOLOGY, EKG & ADDITIONAL TESTS: Reviewed

## 2024-03-11 NOTE — ED PROVIDER NOTE - OBJECTIVE STATEMENT
78-year-old, with history of Crohn's, A-fib/A-flutter, prior ileocecostomy with prolonged hospital course with extensive abdominal surgeries and ileostomy, history of sepsis, after Pseudomonas and E. coli and biliary drain, short-bowel syndrome, prior pleural effusion, hypothyroidism, depression,  Patient recently discharged a few weeks ago, now with recurrent bleeding from stoma, as per home health nurse at least 750 cc of bleeding, patient feels very weak and lightheaded, presents with hypotension 70s, no cough, no fever, denies abdominal pain

## 2024-03-11 NOTE — ED ADULT TRIAGE NOTE - CHIEF COMPLAINT QUOTE
Pt c/o bleeding stoma x 4 hours. Per nurse accompanying pt, "I emptied 750cc when I got there, there was a lot of clots". No anticoagulant use. "His blood pressure runs low". Surgery resident in triage.

## 2024-03-11 NOTE — H&P ADULT - PROBLEM SELECTOR PLAN 2
On admission met 2/4 SIRS criteria with HR > 106, WBC >13.81. No source of infection. Patient nontoxic and afebrile.   Extensive surgery course as mentioned above with EC fistula c/b biliary cx revealing Carbapenem res Psa and Ecoli.   - continue to monitor off antibiotics  - f/u blood cultures  - f/u urine cultures

## 2024-03-11 NOTE — ED ADULT NURSE NOTE - NSFALLRISKINTERV_ED_ALL_ED

## 2024-03-12 NOTE — DIETITIAN INITIAL EVALUATION ADULT - NS FNS DIET ORDER
Diet, Regular:   Supplement Feeding Modality:  Oral  Ensure Max Cans or Servings Per Day:  1       Frequency:  Three Times a day (03-11-24 @ 22:48)

## 2024-03-12 NOTE — DIETITIAN INITIAL EVALUATION ADULT - PROBLEM SELECTOR PLAN 3
Cr 1.62 on admission. Baseline 1.1-1.2. Most likely prerenal etiology given volume status and anemia.  - Urine lytes   - Trend Cr, Avoid nephrotoxic meds   - encourage PO intake  - Strict I/O's and monitoring of ostomy output.

## 2024-03-12 NOTE — PROGRESS NOTE ADULT - SUBJECTIVE AND OBJECTIVE BOX
24 hr events:  3/11 o/n: admitted to  for acute blood loss anemia. received 2u pRBC in the ED. no further bleeding noted from wound site. post-transfusion CBC 7.3 (received 2u however also received 1L bolus, no further evidence of bleeding). trops lateral. kidney function improving. given additional 1L LR bolus over 2h. FSG 84, stopped bolus, started D5LR @ 50cc/h. repeat FSG 2 hours later 88, increased rate of D5LR to 75cc/h. bcx NG @ 12H. AM Hgb stable at 7.3.     SUBJECTIVE: Patient seen at bedside in no acute distress. No CP, SOB, fevers or chills. Says he feels well and wants to go home. No bleeding from wound over night    Vital Signs Last 24 Hrs  T(C): 36.3 (12 Mar 2024 05:20), Max: 36.4 (11 Mar 2024 15:08)  T(F): 97.4 (12 Mar 2024 05:20), Max: 97.6 (11 Mar 2024 17:49)  HR: 104 (12 Mar 2024 04:04) (92 - 106)  BP: 96/51 (12 Mar 2024 04:04) (79/47 - 121/65)  BP(mean): 69 (12 Mar 2024 04:04) (69 - 81)  RR: 16 (12 Mar 2024 04:04) (16 - 19)  SpO2: 97% (12 Mar 2024 04:04) (94% - 100%)    Parameters below as of 12 Mar 2024 04:04  Patient On (Oxygen Delivery Method): room air        Physical Exam:  General: NAD  Pulmonary: Nonlabored breathing, no respiratory distress  Cardiovascular: NSR  Abdominal: soft, non distended, non tender to palpation on all four quadrants. Midline wound manager intact with no blood. Healthy granulation tissue throughout. EC fistula with green, thick output.   Extremities: WWP, SCDs in place    Lines/drains/tubes:    I&O's Summary    11 Mar 2024 07:01  -  12 Mar 2024 06:39  --------------------------------------------------------  IN: 0 mL / OUT: 650 mL / NET: -650 mL        LABS:                        7.3    10.79 )-----------( 175      ( 12 Mar 2024 05:30 )             22.4     03-12    134<L>  |  103  |  36<H>  ----------------------------<  93  4.1   |  24  |  1.57<H>    Ca    8.1<L>      12 Mar 2024 05:30  Phos  4.0     03-12  Mg     2.0     03-12    TPro  6.8  /  Alb  1.9<L>  /  TBili  4.2<H>  /  DBili  x   /  AST  167<H>  /  ALT  94<H>  /  AlkPhos  117  03-12    PT/INR - ( 11 Mar 2024 13:33 )   PT: 14.8 sec;   INR: 1.31          PTT - ( 11 Mar 2024 13:33 )  PTT:32.5 sec  Urinalysis Basic - ( 12 Mar 2024 05:30 )    Color: x / Appearance: x / SG: x / pH: x  Gluc: 93 mg/dL / Ketone: x  / Bili: x / Urobili: x   Blood: x / Protein: x / Nitrite: x   Leuk Esterase: x / RBC: x / WBC x   Sq Epi: x / Non Sq Epi: x / Bacteria: x      CAPILLARY BLOOD GLUCOSE      POCT Blood Glucose.: 88 mg/dL (12 Mar 2024 05:16)  POCT Blood Glucose.: 84 mg/dL (12 Mar 2024 03:18)    LIVER FUNCTIONS - ( 12 Mar 2024 05:30 )  Alb: 1.9 g/dL / Pro: 6.8 g/dL / ALK PHOS: 117 U/L / ALT: 94 U/L / AST: 167 U/L / GGT: x             RADIOLOGY & ADDITIONAL STUDIES:

## 2024-03-12 NOTE — DISCHARGE NOTE PROVIDER - NSDCFUSCHEDAPPT_GEN_ALL_CORE_FT
[facility]  [facility] [address]  Scheduled Appointment: 03/29/2024

## 2024-03-12 NOTE — DISCHARGE NOTE NURSING/CASE MANAGEMENT/SOCIAL WORK - NSDCPEFALRISK_GEN_ALL_CORE
For information on Fall & Injury Prevention, visit: [email or web address] OR  [email or web address] OR  https://www.cdc.gov/steadi/patient.html

## 2024-03-12 NOTE — PROGRESS NOTE ADULT - SUBJECTIVE AND OBJECTIVE BOX
Patient is a 78y old  Male who presents with a chief complaint of significant blood loss from stoma    INTERVAL HPI/OVERNIGHT EVENTS:   Yesterday patient received fluid repletion >2L. Clinically improved since then.    Vital Signs Last 24 Hrs  T(C): 36.3 (12 Mar 2024 05:20), Max: 36.4 (11 Mar 2024 15:08)  T(F): 97.4 (12 Mar 2024 05:20), Max: 97.6 (11 Mar 2024 17:49)  HR: 104 (12 Mar 2024 04:04) (92 - 106)  BP: 96/51 (12 Mar 2024 04:04) (79/47 - 121/65)  BP(mean): 69 (12 Mar 2024 04:04) (69 - 81)  ABP: --  ABP(mean): --  RR: 16 (12 Mar 2024 04:04) (16 - 19)  SpO2: 97% (12 Mar 2024 04:04) (94% - 100%)    O2 Parameters below as of 12 Mar 2024 04:04  Patient On (Oxygen Delivery Method): room air          I&O's Summary    11 Mar 2024 07:01  -  12 Mar 2024 07:00  --------------------------------------------------------  IN: 0 mL / OUT: 650 mL / NET: -650 mL      PHYSICAL EXAM:  CONSTITUTIONAL: Awake, alert, somnolent, pale, improved jaundice   HEAD: Normocephalic, atraumatic.  EYES: Conjunctivae clear without exudates or hemorrhage. Sclera is non-icteric.  ENT: Normal appearing external ears, nose, mucous membranes moist.  NECK: supple, trachea midline.  HEART:  RRR, No murmurs, rubs or gallops  LUNGS:  CLTA b/l, no wheezing  ABDOMEN: soft, non distended, non tender to palpation on all four quadrants, ileostomy with dark blood filling bag/ no clots / no stool  EC fistula with green, thick output.   SKIN: Skin in warm, dry and intact without rashes or lesions.  Appropriate color for ethnicity.  NEUROLOGICAL:  Patient is alert, oriented x person, place and time.  PSYCH: Alert and oriented to person, place, time/situation. normal mood and affect. no apparent risk to self or others.    LABS:                        7.3    10.79 )-----------( 175      ( 12 Mar 2024 05:30 )             22.4     03-12    134<L>  |  103  |  36<H>  ----------------------------<  93  4.1   |  24  |  1.57<H>    Ca    8.1<L>      12 Mar 2024 05:30  Phos  4.0     03-12  Mg     2.0     03-12    TPro  6.8  /  Alb  1.9<L>  /  TBili  4.2<H>  /  DBili  x   /  AST  167<H>  /  ALT  94<H>  /  AlkPhos  117  03-12    PT/INR - ( 11 Mar 2024 13:33 )   PT: 14.8 sec;   INR: 1.31          PTT - ( 11 Mar 2024 13:33 )  PTT:32.5 sec  Urinalysis Basic - ( 12 Mar 2024 05:30 )    Color: x / Appearance: x / SG: x / pH: x  Gluc: 93 mg/dL / Ketone: x  / Bili: x / Urobili: x   Blood: x / Protein: x / Nitrite: x   Leuk Esterase: x / RBC: x / WBC x   Sq Epi: x / Non Sq Epi: x / Bacteria: x      CAPILLARY BLOOD GLUCOSE      POCT Blood Glucose.: 88 mg/dL (12 Mar 2024 05:16)  POCT Blood Glucose.: 84 mg/dL (12 Mar 2024 03:18)        RADIOLOGY & ADDITIONAL TESTS: Reviewed.    Consultant(s) Notes Reviewed:  [x ] YES  [ ] NO    MEDICATIONS  (STANDING):  atorvastatin 20 milliGRAM(s) Oral at bedtime  dextrose 5% + lactated ringers. 1000 milliLiter(s) (75 mL/Hr) IV Continuous <Continuous>  levothyroxine 50 MICROGram(s) Oral daily  pancrelipase  (CREON 24,000 Lipase Units) 1 Capsule(s) Oral three times a day with meals  venlafaxine XR. 150 milliGRAM(s) Oral every 12 hours    MEDICATIONS  (PRN):  acetaminophen     Tablet .. 650 milliGRAM(s) Oral every 6 hours PRN Temp greater or equal to 38C (100.4F), Mild Pain (1 - 3)  LORazepam     Tablet 0.5 milliGRAM(s) Oral at bedtime PRN Anxiety and/or insomnia      Care Discussed with Consultants/Other Providers [ x] YES  [ ] NO Patient is a 78y old  Male who presents with a chief complaint of significant blood loss from stoma    INTERVAL HPI/OVERNIGHT EVENTS:   Yesterday patient received fluid repletion >2L. Also s/p 2upRBCs in ED. Clinically improved since then.    Vital Signs Last 24 Hrs  T(C): 36.3 (12 Mar 2024 05:20), Max: 36.4 (11 Mar 2024 15:08)  T(F): 97.4 (12 Mar 2024 05:20), Max: 97.6 (11 Mar 2024 17:49)  HR: 104 (12 Mar 2024 04:04) (92 - 106)  BP: 96/51 (12 Mar 2024 04:04) (79/47 - 121/65)  BP(mean): 69 (12 Mar 2024 04:04) (69 - 81)  ABP: --  ABP(mean): --  RR: 16 (12 Mar 2024 04:04) (16 - 19)  SpO2: 97% (12 Mar 2024 04:04) (94% - 100%)    O2 Parameters below as of 12 Mar 2024 04:04  Patient On (Oxygen Delivery Method): room air          I&O's Summary    11 Mar 2024 07:01  -  12 Mar 2024 07:00  --------------------------------------------------------  IN: 0 mL / OUT: 650 mL / NET: -650 mL      PHYSICAL EXAM:  CONSTITUTIONAL: Awake, alert, somnolent, pale, improved jaundice   HEAD: Normocephalic, atraumatic.  EYES: Conjunctivae clear without exudates or hemorrhage. Sclera is non-icteric.  ENT: Normal appearing external ears, nose, mucous membranes moist.  NECK: supple, trachea midline.  HEART:  RRR, No murmurs, rubs or gallops  LUNGS:  CLTA b/l, no wheezing  ABDOMEN: soft, non distended, non tender to palpation on all four quadrants, ileostomy with dark blood filling bag/ no clots / no stool  EC fistula with green, thick output.   SKIN: Skin in warm, dry and intact without rashes or lesions.  Appropriate color for ethnicity.  NEUROLOGICAL:  Patient is alert, oriented x person, place and time.  PSYCH: Alert and oriented to person, place, time/situation. normal mood and affect. no apparent risk to self or others.    LABS:                        7.3    10.79 )-----------( 175      ( 12 Mar 2024 05:30 )             22.4     03-12    134<L>  |  103  |  36<H>  ----------------------------<  93  4.1   |  24  |  1.57<H>    Ca    8.1<L>      12 Mar 2024 05:30  Phos  4.0     03-12  Mg     2.0     03-12    TPro  6.8  /  Alb  1.9<L>  /  TBili  4.2<H>  /  DBili  x   /  AST  167<H>  /  ALT  94<H>  /  AlkPhos  117  03-12    PT/INR - ( 11 Mar 2024 13:33 )   PT: 14.8 sec;   INR: 1.31          PTT - ( 11 Mar 2024 13:33 )  PTT:32.5 sec  Urinalysis Basic - ( 12 Mar 2024 05:30 )    Color: x / Appearance: x / SG: x / pH: x  Gluc: 93 mg/dL / Ketone: x  / Bili: x / Urobili: x   Blood: x / Protein: x / Nitrite: x   Leuk Esterase: x / RBC: x / WBC x   Sq Epi: x / Non Sq Epi: x / Bacteria: x      CAPILLARY BLOOD GLUCOSE      POCT Blood Glucose.: 88 mg/dL (12 Mar 2024 05:16)  POCT Blood Glucose.: 84 mg/dL (12 Mar 2024 03:18)        RADIOLOGY & ADDITIONAL TESTS: Reviewed.    Consultant(s) Notes Reviewed:  [x ] YES  [ ] NO    MEDICATIONS  (STANDING):  atorvastatin 20 milliGRAM(s) Oral at bedtime  dextrose 5% + lactated ringers. 1000 milliLiter(s) (75 mL/Hr) IV Continuous <Continuous>  levothyroxine 50 MICROGram(s) Oral daily  pancrelipase  (CREON 24,000 Lipase Units) 1 Capsule(s) Oral three times a day with meals  venlafaxine XR. 150 milliGRAM(s) Oral every 12 hours    MEDICATIONS  (PRN):  acetaminophen     Tablet .. 650 milliGRAM(s) Oral every 6 hours PRN Temp greater or equal to 38C (100.4F), Mild Pain (1 - 3)  LORazepam     Tablet 0.5 milliGRAM(s) Oral at bedtime PRN Anxiety and/or insomnia      Care Discussed with Consultants/Other Providers [ x] YES  [ ] NO Patient is a 78y old  Male who presents with a chief complaint of significant blood loss from stoma    HOSPITAL COURSE:  78M w PMH Crohn's, AFib/Flutter s/p DCCVs, remote ileocectomy and open appendectomy,  with extensive surgical hx (see HPI), admitted for  with bleeding from wound bed (750cc according to home nurse) and fatigue. Denies any fevers, chills, headache, chest pain, palpitations, cough, runny nose, abdominal pain, nausea, vomiting, changes in EC fistula output, urinary symptoms.     INTERVAL HPI/OVERNIGHT EVENTS:   Yesterday patient received fluid repletion >2L. Also s/p 2upRBCs in ED. Clinically improved since then.    Vital Signs Last 24 Hrs  T(C): 36.3 (12 Mar 2024 05:20), Max: 36.4 (11 Mar 2024 15:08)  T(F): 97.4 (12 Mar 2024 05:20), Max: 97.6 (11 Mar 2024 17:49)  HR: 104 (12 Mar 2024 04:04) (92 - 106)  BP: 96/51 (12 Mar 2024 04:04) (79/47 - 121/65)  BP(mean): 69 (12 Mar 2024 04:04) (69 - 81)  ABP: --  ABP(mean): --  RR: 16 (12 Mar 2024 04:04) (16 - 19)  SpO2: 97% (12 Mar 2024 04:04) (94% - 100%)    O2 Parameters below as of 12 Mar 2024 04:04  Patient On (Oxygen Delivery Method): room air          I&O's Summary    11 Mar 2024 07:01  -  12 Mar 2024 07:00  --------------------------------------------------------  IN: 0 mL / OUT: 650 mL / NET: -650 mL      PHYSICAL EXAM:  CONSTITUTIONAL: Awake, alert, somnolent, pale, improved jaundice   HEAD: Normocephalic, atraumatic.  EYES: Conjunctivae clear without exudates or hemorrhage. Sclera is non-icteric.  ENT: Normal appearing external ears, nose, mucous membranes moist.  NECK: supple, trachea midline.  HEART:  RRR, No murmurs, rubs or gallops  LUNGS:  CLTA b/l, no wheezing  ABDOMEN: soft, non distended, non tender to palpation on all four quadrants, ileostomy with dark blood filling bag/ no clots / no stool  EC fistula with green, thick output.   SKIN: Skin in warm, dry and intact without rashes or lesions.  Appropriate color for ethnicity.  NEUROLOGICAL:  Patient is alert, oriented x person, place and time.  PSYCH: Alert and oriented to person, place, time/situation. normal mood and affect. no apparent risk to self or others.    LABS:                        7.3    10.79 )-----------( 175      ( 12 Mar 2024 05:30 )             22.4     03-12    134<L>  |  103  |  36<H>  ----------------------------<  93  4.1   |  24  |  1.57<H>    Ca    8.1<L>      12 Mar 2024 05:30  Phos  4.0     03-12  Mg     2.0     03-12    TPro  6.8  /  Alb  1.9<L>  /  TBili  4.2<H>  /  DBili  x   /  AST  167<H>  /  ALT  94<H>  /  AlkPhos  117  03-12    PT/INR - ( 11 Mar 2024 13:33 )   PT: 14.8 sec;   INR: 1.31          PTT - ( 11 Mar 2024 13:33 )  PTT:32.5 sec  Urinalysis Basic - ( 12 Mar 2024 05:30 )    Color: x / Appearance: x / SG: x / pH: x  Gluc: 93 mg/dL / Ketone: x  / Bili: x / Urobili: x   Blood: x / Protein: x / Nitrite: x   Leuk Esterase: x / RBC: x / WBC x   Sq Epi: x / Non Sq Epi: x / Bacteria: x      CAPILLARY BLOOD GLUCOSE      POCT Blood Glucose.: 88 mg/dL (12 Mar 2024 05:16)  POCT Blood Glucose.: 84 mg/dL (12 Mar 2024 03:18)        RADIOLOGY & ADDITIONAL TESTS: Reviewed.    Consultant(s) Notes Reviewed:  [x ] YES  [ ] NO    MEDICATIONS  (STANDING):  atorvastatin 20 milliGRAM(s) Oral at bedtime  dextrose 5% + lactated ringers. 1000 milliLiter(s) (75 mL/Hr) IV Continuous <Continuous>  levothyroxine 50 MICROGram(s) Oral daily  pancrelipase  (CREON 24,000 Lipase Units) 1 Capsule(s) Oral three times a day with meals  venlafaxine XR. 150 milliGRAM(s) Oral every 12 hours    MEDICATIONS  (PRN):  acetaminophen     Tablet .. 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   Patient repleted w/ 2upRBC in ED on admission. Seen by wound care after admission to tele (has visiting wound care nurses at home). Received another 1upRBC on tele per surgery recs. Wound bed rebleeding occurred, and patient had bedside cauterization by surgery.     INTERVAL HPI/OVERNIGHT EVENTS:   Yesterday patient received fluid repletion >2L. Also s/p 2upRBCs in ED. Clinically improved since then. At bedside no acute complaints.     Vital Signs Last 24 Hrs  T(C): 36.3 (12 Mar 2024 05:20), Max: 36.4 (11 Mar 2024 15:08)  T(F): 97.4 (12 Mar 2024 05:20), Max: 97.6 (11 Mar 2024 17:49)  HR: 104 (12 Mar 2024 04:04) (92 - 106)  BP: 96/51 (12 Mar 2024 04:04) (79/47 - 121/65)  BP(mean): 69 (12 Mar 2024 04:04) (69 - 81)  ABP: --  ABP(mean): --  RR: 16 (12 Mar 2024 04:04) (16 - 19)  SpO2: 97% (12 Mar 2024 04:04) (94% - 100%)    O2 Parameters below as of 12 Mar 2024 04:04  Patient On (Oxygen Delivery Method): room air          I&O's Summary    11 Mar 2024 07:01  -  12 Mar 2024 07:00  --------------------------------------------------------  IN: 0 mL / OUT: 650 mL / NET: -650 mL      PHYSICAL EXAM:  CONSTITUTIONAL: Awake, alert, somnolent, pale, improved jaundice   HEAD: Normocephalic, atraumatic.  EYES: Conjunctivae clear without exudates or hemorrhage. Sclera is non-icteric.  ENT: Normal appearing external ears, nose, mucous membranes moist.  NECK: supple, trachea midline.  HEART:  RRR, No murmurs, rubs or gallops  LUNGS:  CLTA b/l, no wheezing  ABDOMEN: soft, non distended, non tender to palpation on all four quadrants, ileostomy with dark blood filling bag/ no clots / no stool  EC fistula with green, thick output.   SKIN: Skin in warm, dry and intact without rashes or lesions.  Appropriate color for ethnicity.  NEUROLOGICAL:  Patient is alert, oriented x person, place and time.  PSYCH: Alert and oriented to person, place, time/situation. normal mood and affect. no apparent risk to self or others.    LABS:                        7.3    10.79 )-----------( 175      ( 12 Mar 2024 05:30 )             22.4     03-12    134<L>  |  103  |  36<H>  ----------------------------<  93  4.1   |  24  |  1.57<H>    Ca    8.1<L>      12 Mar 2024 05:30  Phos  4.0     03-12  Mg     2.0     03-12    TPro  6.8  /  Alb  1.9<L>  /  TBili  4.2<H>  /  DBili  x   /  AST  167<H>  /  ALT  94<H>  /  AlkPhos  117  03-12    PT/INR - ( 11 Mar 2024 13:33 )   PT: 14.8 sec;   INR: 1.31          PTT - ( 11 Mar 2024 13:33 )  PTT:32.5 sec  Urinalysis Basic - ( 12 Mar 2024 05:30 )    Color: x / Appearance: x / SG: x / pH: x  Gluc: 93 mg/dL / Ketone: x  / Bili: x / Urobili: x   Blood: x / Protein: x / Nitrite: x   Leuk Esterase: x / RBC: x / WBC x   Sq Epi: x / Non Sq Epi: x / Bacteria: x      CAPILLARY BLOOD GLUCOSE      POCT Blood Glucose.: 88 mg/dL (12 Mar 2024 05:16)  POCT Blood Glucose.: 84 mg/dL (12 Mar 2024 03:18)        RADIOLOGY & ADDITIONAL TESTS: Reviewed.    Consultant(s) Notes Reviewed:  [x ] YES  [ ] NO    MEDICATIONS  (STANDING):  atorvastatin 20 milliGRAM(s) Oral at bedtime  dextrose 5% + lactated ringers. 1000 milliLiter(s) (75 mL/Hr) IV Continuous <Continuous>  levothyroxine 50 MICROGram(s) Oral daily  pancrelipase  (CREON 24,000 Lipase Units) 1 Capsule(s) Oral three times a day with meals  venlafaxine XR. 150 milliGRAM(s) Oral every 12 hours    MEDICATIONS  (PRN):  acetaminophen     Tablet .. 650 milliGRAM(s) Oral every 6 hours PRN Temp greater or equal to 38C (100.4F), Mild Pain (1 - 3)  LORazepam     Tablet 0.5 milliGRAM(s) Oral at bedtime PRN Anxiety and/or insomnia      Care Discussed with Consultants/Other Providers [ x] YES  [ ] NO

## 2024-03-12 NOTE — DISCHARGE NOTE NURSING/CASE MANAGEMENT/SOCIAL WORK - NSDCVIVACCINE_GEN_ALL_CORE_FT
influenza, high-dose, quadrivalent; 23-Jan-2024 11:29; Deandra Mackay (RN); [employer]; [id number] (Exp. Date: 30-Jun-2024); IntraMuscular; Deltoid Left.; 0.7 milliLiter(s); VIS (VIS Published: 06-Aug-2021, VIS Presented: 23-Jan-2024);

## 2024-03-12 NOTE — DIETITIAN INITIAL EVALUATION ADULT - OTHER CALCULATIONS
Estimated nutritional needs determined using [facility] Standards of Nutrition Care for elderly repletion, complex surgical hx, and long-term TPN using current body weight 95.3 kg (110%IBW).

## 2024-03-12 NOTE — DIETITIAN INITIAL EVALUATION ADULT - NSICDXPASTMEDICALHX_GEN_ALL_CORE_FT
PAST MEDICAL HISTORY:  Atrial fibrillation s/p cardioversion 2010 and 2014  Pt. reports 4 DCCV  Now on Amiodarone 200mg PO bid and Eliquis 5mg PO bid  Last DCCV 4 yrs ago at [facility]    Bradycardia     Crohn's disease s/p partial resection of ileum    Essential hypertension Hypertension    History of depression On Venlafaxine ER 150mg PO bid    Hyperlipidemia     Hypothyroidism     Junctional rhythm

## 2024-03-12 NOTE — ADVANCED PRACTICE NURSE CONSULT - RECOMMEDATIONS
Will continue to follow Will continue to follow. Total amount of time spent utilizing 2 WOCNs) = 1 hour

## 2024-03-12 NOTE — DISCHARGE NOTE PROVIDER - CARE PROVIDER_API CALL
Daniel Aguiar  Internal Medicine  [address] [place] [id number]  Phone: [phone]  Fax: [phone]  Established Patient  Follow Up Time: 1 week

## 2024-03-12 NOTE — DIETITIAN INITIAL EVALUATION ADULT - PERTINENT LABORATORY DATA
03-12    134<L>  |  103  |  36<H>  ----------------------------<  93  4.1   |  24  |  1.57<H>    Ca    8.1<L>      12 Mar 2024 05:30  Phos  4.0     03-12  Mg     2.0     03-12    TPro  6.8  /  Alb  1.9<L>  /  TBili  4.2<H>  /  DBili  x   /  AST  167<H>  /  ALT  94<H>  /  AlkPhos  117  03-12  POCT Blood Glucose.: 113 mg/dL (03-12-24 @ 12:05)  A1C with Estimated Average Glucose Result: 5.1 % (09-11-23 @ 04:47)  A1C with Estimated Average Glucose Result: 4.8 % (06-27-23 @ 05:30)

## 2024-03-12 NOTE — PROGRESS NOTE ADULT - ASSESSMENT
78M w PMH Crohn's, AFib/Flutter s/p DCCVs on amiodarone, remote ileocectomy and open appendectomy. Admitted (6/23) for SBO vs Crohns flare, s/p NGT decompression and s/p lap converted to open TRE, SBR x 3, left in discontinuity with abthera vac on (6/27), RTOR for ileocolic resection, small bowel anastomosis, and abdominal wall closure on (6/28), c/b fluid collection s/p IR aspiration of perihepatic fluid on (7/3), c/b wound dehiscence s/p RTOR exlap, washout, ileocolic resection with end ileostomy, blow hole colostomy, red rubber from ileostomy to small bowel anastomosis; vicryl bridging mesh on (7/5) transferred to SICU postoperatively for hemodynamic monitoring, with hospital course complicated by periods of severe ELVI and hyponatremia, which resolved but stepped back up for to SICU on (9/10) for acute AMS, intermittent hypoglycemia, AFib with RVR. Percutaneous Cholecystostomy placed on (9/11) for enlarged GB, PCT capped 10/5 and uncapped 10/25 for hyperbilirubinemia, Right brachial DVT, left basillic and cephalic superficial thrombus on duplex 11/2, CT 11/14 with ALDEN ground glass opacity of unclear etiology, completed empiric 7day cefepime course 11/22, and another course of 7day cefepime for PNA  (1/15-23), hyperbilirubinemia of unclear etiology, resolved candida glabrata fungemia, ELVI resolved, second episode of sinus pause--pacemaker placed (2/5). cystic duct open, Perc cony removed 2/8. Discharged home 2/12/24. Recently presented to ED twice for anemia requiring PRBC transfusion and dischgarged. Readmitted on 2/28/24 for generalized weakness, decreased PO intake, nbnb vomiting, nausea and increased stoma output with concerns for sepsis, with cholecystitis high in differential, treated with cefepime 2g for 3 days and discharged on 3/3/24. Now presents with bleeding from wound bed (750cc according to home nurse) and fatigue.    Recommendations:    No acute surgical intervention   Trend hemoglobin. Transfuse as needed  Serial wound checks to assess for bleeding- please contact surgery team if any acute bleeding from wound bed.  Continue to monitor outputs and replete as necessary     Team 5 Surgery will continue to follow

## 2024-03-12 NOTE — DISCHARGE NOTE PROVIDER - HOSPITAL COURSE
#Discharge: do not delete  78M w PMH Crohn's, AFib/Flutter s/p DCCVs, remote ileocectomy and open appendectomy with prolonged stay at [facility] now re-admitted for anemia 2/2 blood loss from wound site being medically managed.     Hospital course (by problem):    #Anemia due to acute blood loss.   Acute blood loss anemia (Hb: 6.3), with components of demand ischemia (Trop 77) and ELVI (Cr. 1.62) - Bleeding from wound bed controlled. s/p 3u prbc  - Gen Surg and Vasc surg consulted  - area of ostomy was stitched/cauterized by vascular surgery    #Systemic inflammatory response syndrome (SIRS).   On admission met 2/4 SIRS criteria with HR > 106, WBC >13.81. No source of infection. Patient nontoxic and afebrile.   Recent extensive surgery course with EC fistula c/b biliary cx revealing Carbapenem res Psa and Ecoli.   - continue to monitor off antibiotics  - blood cultures collected    #ELVI  Cr 1.62 on admission. Baseline 1.1-1.2. Most likely prerenal etiology given volume status and anemia.    #hx of  short bowel syndrome  History of Short bowel syndrome on last admission, high output w/ EC fistula on TPN at home  - home gattrex  - TPN confirmed with [facility]    #Diagnosis of AFib/Flutter s/p DCCVs.   Home medication: amiodarone 200 mg QD?, lopressor 50 mg BID PO  - as per pt, not taking amio anymore  - instructions as per PCP about when to resume lopressor, will hold off given bleed and normotension    #hypothyroid  Home med: Synthroid 50 mcg qd  - Continue home medication.    #depression  Home med: venlafaxine 150 mg BID  - Continue home medication.    Patient was discharged to: home    New medications: simethicone  Changes to old medications: n/a  Medications that were stopped: lopressor    Items to follow up as outpatient: PCP    Physical exam at the time of discharge:       #Discharge: do not delete  78M w PMH Crohn's, AFib/Flutter s/p DCCVs, remote ileocectomy and open appendectomy with prolonged stay at [facility] now re-admitted for anemia 2/2 blood loss from wound site being medically managed.     Hospital course (by problem):    #Anemia due to acute blood loss.   Acute blood loss anemia (Hb: 6.3), with components of demand ischemia (Trop 77) and ELVI (Cr. 1.62) - Bleeding from wound bed controlled. s/p 3u prbc  - Gen Surg and Vasc surg consulted  - area of ostomy was stitched/cauterized by vascular surgery    #Systemic inflammatory response syndrome (SIRS).   On admission met 2/4 SIRS criteria with HR > 106, WBC >13.81. No source of infection. Patient nontoxic and afebrile.   Recent extensive surgery course with EC fistula c/b biliary cx revealing Carbapenem res Psa and Ecoli.   - continue to monitor off antibiotics  - blood cultures collected    #ELVI  Cr 1.62 on admission. Baseline 1.1-1.2. Most likely prerenal etiology given volume status and anemia.    #hx of  short bowel syndrome  History of Short bowel syndrome on last admission, high output w/ EC fistula on TPN at home  - home gattrex  - TPN confirmed with [facility]    #Diagnosis of AFib/Flutter s/p DCCVs.   Home medication: amiodarone 200 mg QD?, lopressor 50 mg BID PO  - as per pt, not taking amio anymore  - instructions as per PCP about when to resume lopressor, will hold off given bleed and normotension    #hypothyroid  Home med: Synthroid 50 mcg qd  - Continue home medication.    #depression  Home med: venlafaxine 150 mg BID  - Continue home medication.    Patient was discharged to: home    New medications: simethicone  Changes to old medications: n/a  Medications that were stopped: lopressor    Items to follow up as outpatient: PCP    Physical exam at the time of discharge:  CONSTITUTIONAL: Awake, alert, in no acute distress.   HEAD: Normocephalic, atraumatic.  EYES: Conjunctivae clear without exudates or hemorrhage. Sclera is non-icteric.  ENT: Normal appearing external ears, nose, mucous membranes moist.  NECK: supple, trachea midline.  HEART:  RRR, No murmurs, rubs or gallops  LUNGS:  CLTA b/l, no wheezing  ABDOMEN: soft, non distended, non tender to palpation on all four quadrants, ileostomy with bag attached. EC fistula with green, thick output.   SKIN: Skin in warm, dry and intact without rashes or lesions.  Appropriate color for ethnicity.  NEUROLOGICAL:  Patient is alert, oriented x person, place and time.  PSYCH: Alert and oriented to person, place, time/situation. normal mood and affect. no apparent risk to self or others.     #Discharge: do not delete  78M w PMH Crohn's, AFib/Flutter s/p DCCVs, remote ileocectomy and open appendectomy with prolonged stay at [facility] now re-admitted for anemia 2/2 blood loss from wound site being medically managed.     Hospital course (by problem):    #Anemia due to acute blood loss.   Acute blood loss anemia (Hb: 6.3), with components of demand ischemia (Trop 77) and ELVI (Cr. 1.62) - Bleeding from wound bed controlled. s/p 3u prbc  - Gen Surg and Vasc surg consulted  - area of ostomy was stitched/cauterized by vascular surgery    #Systemic inflammatory response syndrome (SIRS).   On admission met 2/4 SIRS criteria with HR > 106, WBC >13.81. No source of infection. Patient nontoxic and afebrile.   Recent extensive surgery course with EC fistula c/b biliary cx revealing Carbapenem res Psa and Ecoli.   - continue to monitor off antibiotics  - blood cultures collected    #ELVI  Cr 1.62 on admission. Baseline 1.1-1.2. Most likely prerenal etiology given volume status and anemia.    #hx of  short bowel syndrome  History of Short bowel syndrome on last admission, high output w/ EC fistula on TPN at home  - home gattrex  - TPN confirmed with [facility]    #Diagnosis of AFib/Flutter s/p DCCVs.   Home medication: amiodarone 200 mg QD?, lopressor 50 mg BID PO  - as per pt, not taking amio anymore    #hypothyroid  Home med: Synthroid 50 mcg qd  - Continue home medication.    #depression  Home med: venlafaxine 150 mg BID  - Continue home medication.    Patient was discharged to: home    New medications: simethicone  Changes to old medications: n/a    Items to follow up as outpatient: PCP    Physical exam at the time of discharge:  CONSTITUTIONAL: Awake, alert, in no acute distress.   HEAD: Normocephalic, atraumatic.  EYES: Conjunctivae clear without exudates or hemorrhage. Sclera is non-icteric.  ENT: Normal appearing external ears, nose, mucous membranes moist.  NECK: supple, trachea midline.  HEART:  RRR, No murmurs, rubs or gallops  LUNGS:  CLTA b/l, no wheezing  ABDOMEN: soft, non distended, non tender to palpation on all four quadrants, ileostomy with bag attached. EC fistula with green, thick output.   SKIN: Skin in warm, dry and intact without rashes or lesions.  Appropriate color for ethnicity.  NEUROLOGICAL:  Patient is alert, oriented x person, place and time.  PSYCH: Alert and oriented to person, place, time/situation. normal mood and affect. no apparent risk to self or others.

## 2024-03-12 NOTE — DIETITIAN INITIAL EVALUATION ADULT - ADD RECOMMEND
-Continue parenteral nutrition support via PICC    *Recommend continue regimen of 1.6L over 12hrs with 250 gDex, 112 gAA, and 50 gSMOF per day to provide 1789 calories and 112 gProt. with GIR of 3.64 mg/kg/min.    *TPN to provide ~80% of estimated needs    *Abnormal LFTs, omit Mn at this time   *Follow-up serum Zn, pt previously receiving x3/week  -Continue regular diet with Ensure TID  -Encourage good PO intake  -Honor food preferences as able  -Align nutrition with goals of care at all times  -Weekly wts  -Monitor chemistry, GI function, and skin integrity

## 2024-03-12 NOTE — DISCHARGE NOTE PROVIDER - NSDCMRMEDTOKEN_GEN_ALL_CORE_FT
allopurinol 300 mg oral tablet: 1 tab(s) orally once a day  atorvastatin 20 mg oral tablet: 1 tab(s) orally once a day (at bedtime)  intravenous electrolyte (Lypholyte II/Nutrilyte II/TPN Electrolytes) solution: 1 each intravenous once a day  Lovenox 40 mg/0.4 mL injectable solution: 40 milligram(s) subcutaneously once a day  pancrelipase 24,000 units-76,000 units-120,000 units oral delayed release capsule: 1 cap(s) orally 3 times a day (with meals)  simethicone 80 mg oral tablet, chewable: 1 tab(s) orally every 12 hours  Synthroid 50 mcg (0.05 mg) oral tablet: 1 tab(s) orally once a day  teduglutide 5 mg subcutaneous kit: 0.05 mg/kg subcutaneously once a day  venlafaxine 150 mg oral tablet, extended release: 1 tab(s) orally 2 times a day   allopurinol 300 mg oral tablet: 1 tab(s) orally once a day  atorvastatin 20 mg oral tablet: 1 tab(s) orally once a day (at bedtime)  ChloraPrep One-Step 2% topical pad: Apply topically to affected area once a day 1 Apply topically to affected area  intravenous electrolyte (Lypholyte II/Nutrilyte II/TPN Electrolytes) solution: 1 each intravenous once a day  Lopressor 50 mg oral tablet: 1 tab(s) orally 2 times a day  Lovenox 40 mg/0.4 mL injectable solution: 40 milligram(s) subcutaneously once a day  pancrelipase 24,000 units-76,000 units-120,000 units oral delayed release capsule: 1 cap(s) orally 3 times a day (with meals)  simethicone 80 mg oral tablet, chewable: 1 tab(s) orally every 12 hours  Synthroid 50 mcg (0.05 mg) oral tablet: 1 tab(s) orally once a day  teduglutide 5 mg subcutaneous kit: 0.05 mg/kg subcutaneously once a day  venlafaxine 150 mg oral tablet, extended release: 1 tab(s) orally 2 times a day

## 2024-03-12 NOTE — DIETITIAN INITIAL EVALUATION ADULT - NSFNSGIIOFT_GEN_A_CORE
03-11-24 @ 07:01  -  03-12-24 @ 07:00  --------------------------------------------------------  OUT:    Ileostomy (mL): 150 mL  Total OUT: 150 mL    Total NET: -150 mL      03-12-24 @ 07:01  -  03-12-24 @ 14:04  --------------------------------------------------------  OUT:    Ileostomy (mL): 20 mL  Total OUT: 20 mL    Total NET: -20 mL

## 2024-03-12 NOTE — PROGRESS NOTE ADULT - ASSESSMENT
78M w PMH Crohn's, AFib/Flutter s/p DCCVs on amiodarone, remote ileocectomy and open appendectomy with prolonged stay at [facility] now re-admitted for anemia 2/2 blood loss from wound site being medically managed.    78M w PMH Crohn's, AFib/Flutter s/p DCCVs, remote ileocectomy and open appendectomy with prolonged stay at [facility] now re-admitted for anemia 2/2 blood loss from wound site being medically managed.

## 2024-03-12 NOTE — DISCHARGE NOTE PROVIDER - NSDCHHNEEDSERVICE_GEN_ALL_CORE
Central venous access care/Medication teaching and assessment/Observation and assessment/Ostomy care and management/Rehabilitation services/Teaching and training/Wound care and assessment

## 2024-03-12 NOTE — DISCHARGE NOTE NURSING/CASE MANAGEMENT/SOCIAL WORK - NSSCNAMETXT_GEN_ALL_CORE
[facility] ;   [facility] (formerly [facility])

## 2024-03-12 NOTE — DISCHARGE NOTE PROVIDER - NSDCCPCAREPLAN_GEN_ALL_CORE_FT
PRINCIPAL DISCHARGE DIAGNOSIS  Diagnosis: Hemorrhage from ileostomy  Assessment and Plan of Treatment: You came to the hospital with bleeding from your ostomy site. You were seen by the surgery team and vascular surgery team. You were given a unit of red blood cells to increase your hemlogbin level. The Vascular surgery team stitched and cauterized an area that was bleeding. Please follow up with your primary care doctor.      SECONDARY DISCHARGE DIAGNOSES  Diagnosis: Afib  Assessment and Plan of Treatment: You were taking a medication called lopressor. It was not given in the hospital due to blood loss and to avoid low blood pressures. We recommend that you do NOT take this medication yet. Please follow up with Dr Aguiar about when to restart lopressor.     PRINCIPAL DISCHARGE DIAGNOSIS  Diagnosis: Hemorrhage from ileostomy  Assessment and Plan of Treatment: You came to the hospital with bleeding from your ostomy site. You were seen by the surgery team and vascular surgery team. You were given a unit of red blood cells to increase your hemlogbin level. The Vascular surgery team stitched and cauterized an area that was bleeding. Please follow up with your primary care doctor.

## 2024-03-12 NOTE — DIETITIAN INITIAL EVALUATION ADULT - PERTINENT MEDS FT
MEDICATIONS  (STANDING):  atorvastatin 20 milliGRAM(s) Oral at bedtime  chlorhexidine 2% Cloths 1 Application(s) Topical <User Schedule>  dextrose 5% + lactated ringers. 1000 milliLiter(s) (75 mL/Hr) IV Continuous <Continuous>  levothyroxine 50 MICROGram(s) Oral daily  lipid, fat emulsion (Fish Oil and Plant Based) 20% Infusion 0.526 Gm/kG/Day (20.83 mL/Hr) IV Continuous <Continuous>  pancrelipase  (CREON 24,000 Lipase Units) 1 Capsule(s) Oral three times a day with meals  Parenteral Nutrition - Adult 1 Each TPN Continuous <Continuous>  simethicone 80 milliGRAM(s) Chew every 12 hours  venlafaxine XR. 150 milliGRAM(s) Oral every 12 hours    MEDICATIONS  (PRN):  acetaminophen     Tablet .. 650 milliGRAM(s) Oral every 6 hours PRN Temp greater or equal to 38C (100.4F), Mild Pain (1 - 3)  LORazepam     Tablet 0.5 milliGRAM(s) Oral at bedtime PRN Anxiety and/or insomnia

## 2024-03-12 NOTE — DIETITIAN INITIAL EVALUATION ADULT - OTHER INFO
78M w PMH Crohn's, AFib/Flutter s/p DCCVs on amiodarone, remote ileocectomy and open appendectomy with prolonged stay at [facility] now re-admitted for anemia 2/2 blood loss from wound site being medically managed.     Pt assessed at bedside on 7LA. Rx and labs reviewed. Pt presents with no overt signs of nutritional wasting. Pt with significant surgical hx and reportedly <120cm of bowel requiring long term TPN via PICC. Pt reports he is stable on home regimen and reports no active concerns regarding his TPN at this time. Reports usual body weight of 210 pounds; current body weight of 210 pounds. Pt with several questions regarding need for TPN for prolonged period of time; pt on gattex therapy. Answered questions as able; will continue to follow with pt while admitted and provide guidance with nutrition plan. Pt tolerating oral diet well; continues on regular and reports good PO intake. Ensure on board. No other reports GI distress or further nutritional concerns at this time. RDN will continue to reassess, intervene, and monitor as appropriate.     Weight History:   2/5 - 94 kg  2/28 - 81.6 kg (?)    Labs 3/12: Na 134 (L), K 4.1, Cl 103, BUN 36 (H), Cr 1.57 (HI), BG 93, Ca 8.1 (L),  (H), ALT 94 (H), GFR 45 (L), Mg 2.0, and Phos 4.0  -Elevated LFTs, recommend hold Mn from bag at this time  -Requested new serum Zn value, pt previously receiving x3/wk     Pain: 0 per chart review   GI: Abdomen non-distended/non-tender, +BS x4, ileostomy   Skin: Post-surgical incision, no edema assessed

## 2024-03-12 NOTE — DISCHARGE NOTE NURSING/CASE MANAGEMENT/SOCIAL WORK - PATIENT PORTAL LINK FT
You can access the FollowMyHealth Patient Portal offered by [facility] by registering at the following website: [email or web address]. By joining [employer]’s FollowMyHealth portal, you will also be able to view your health information using other applications (apps) compatible with our system.

## 2024-03-13 NOTE — PROGRESS NOTE ADULT - PROBLEM SELECTOR PLAN 8
F: s/p 1L NS and 2 units pRBC  E: replete K<4, Mg<2  N: regular/TPN  VTE Prophylaxis: SCDs   GI: None   C: Full Code  D: 7Lachman.
F: s/p 1L NS and 2 units pRBC  E: replete K<4, Mg<2  N: regular/TPN  VTE Prophylaxis: SCDs   GI: None   C: Full Code  D: 7Lachman.

## 2024-03-13 NOTE — PROGRESS NOTE ADULT - PROBLEM SELECTOR PLAN 6
Home med: Synthroid 50 mcg qd  - Continue home medication
Home med: Synthroid 50 mcg qd  - Continue home medication

## 2024-03-13 NOTE — PROCEDURE NOTE - NSPOSTPRCRAD_GEN_A_CORE
tip visualized in ra-svc; 40cm/depth of insertion/line adjusted to depth of insertion/line in appropriate postion/postion of catheter/ultrasound

## 2024-03-13 NOTE — PROGRESS NOTE ADULT - PROBLEM SELECTOR PLAN 2
On admission met 2/4 SIRS criteria with HR > 106, WBC >13.81. No source of infection. Patient nontoxic and afebrile.   Recent extensive surgery course with EC fistula c/b biliary cx revealing Carbapenem res Psa and Ecoli.   - continue to monitor off antibiotics  - f/u blood cultures  - f/u urine cultures
On admission met 2/4 SIRS criteria with HR > 106, WBC >13.81. No source of infection. Patient nontoxic and afebrile.   Recent extensive surgery course with EC fistula c/b biliary cx revealing Carbapenem res Psa and Ecoli.   - continue to monitor off antibiotics  - f/u blood cultures  - f/u urine cultures

## 2024-03-13 NOTE — PROGRESS NOTE ADULT - PROBLEM SELECTOR PLAN 4
History of Short bowel syndrome on last admission, high output w/ EC fistula on TPN at home  - can likely restart home gattrex  - TPN  - Q6H fingersticks   - Monitor EC fistula output.
History of Short bowel syndrome on last admission, high output w/ EC fistula on TPN at home  - can likely restart home gattrex  - TPN  - Q6H fingersticks   - Monitor EC fistula output.

## 2024-03-13 NOTE — PROGRESS NOTE ADULT - PROBLEM SELECTOR PLAN 3
Cr 1.62 on admission. Baseline 1.1-1.2. Most likely prerenal etiology given volume status and anemia.  - Urine lytes   - Trend Cr, Avoid nephrotoxic meds   - encourage PO intake  - Strict I/O's and monitoring of ostomy output.
Cr 1.62 on admission. Baseline 1.1-1.2. Most likely prerenal etiology given volume status and anemia.  - Urine lytes   - Trend Cr, Avoid nephrotoxic meds   - encourage PO intake  - Strict I/O's and monitoring of ostomy output.

## 2024-03-13 NOTE — PROGRESS NOTE ADULT - PROVIDER SPECIALTY LIST ADULT
Colorectal Surgery
Internal Medicine
Internal Medicine
Colorectal Surgery
Internal Medicine
Internal Medicine

## 2024-03-13 NOTE — PROGRESS NOTE ADULT - SUBJECTIVE AND OBJECTIVE BOX
Patient is a 78y old  Male who presents with a chief complaint of blood loss from stoma (13 Mar 2024 08:01)      INTERVAL HPI/OVERNIGHT EVENTS:       Vital Signs Last 24 Hrs  T(C): 36.1 (13 Mar 2024 09:53), Max: 36.6 (13 Mar 2024 01:00)  T(F): 96.9 (13 Mar 2024 09:53), Max: 97.8 (13 Mar 2024 01:00)  HR: 108 (13 Mar 2024 12:30) (102 - 108)  BP: 98/52 (13 Mar 2024 12:30) (98/52 - 128/61)  BP(mean): 73 (13 Mar 2024 12:30) (73 - 88)  ABP: --  ABP(mean): --  RR: 17 (13 Mar 2024 12:30) (17 - 18)  SpO2: 92% (13 Mar 2024 12:30) (92% - 97%)    O2 Parameters below as of 13 Mar 2024 12:30  Patient On (Oxygen Delivery Method): room air        I&O's Summary    12 Mar 2024 07:01  -  13 Mar 2024 07:00  --------------------------------------------------------  IN: 404 mL / OUT: 1370 mL / NET: -966 mL      PHYSICAL EXAM:  CONSTITUTIONAL: Awake, alert, in no acute distress.   HEAD: Normocephalic, atraumatic.  EYES: Conjunctivae clear without exudates or hemorrhage. Sclera is non-icteric.  ENT: Normal appearing external ears, nose, mucous membranes moist.  NECK: supple, trachea midline.  HEART:  RRR, No murmurs, rubs or gallops  LUNGS:  CLTA b/l, no wheezing  ABDOMEN: soft, non distended, non tender to palpation on all four quadrants, ileostomy with bag attached. EC fistula with green, thick output.   SKIN: Skin in warm, dry and intact without rashes or lesions.  Appropriate color for ethnicity.  NEUROLOGICAL:  Patient is alert, oriented x person, place and time.  PSYCH: Alert and oriented to person, place, time/situation. normal mood and affect. no apparent risk to self or others.        LABS:                        8.6    10.90 )-----------( 178      ( 13 Mar 2024 05:30 )             26.0     03-13    139  |  105  |  31<H>  ----------------------------<  121<H>  4.5   |  28  |  1.30    Ca    8.1<L>      13 Mar 2024 05:30  Phos  3.2     03-13  Mg     2.1     03-13    TPro  7.0  /  Alb  2.1<L>  /  TBili  4.6<H>  /  DBili  x   /  AST  168<H>  /  ALT  91<H>  /  AlkPhos  118  03-13    PT/INR - ( 11 Mar 2024 13:33 )   PT: 14.8 sec;   INR: 1.31          PTT - ( 11 Mar 2024 13:33 )  PTT:32.5 sec  Urinalysis Basic - ( 13 Mar 2024 05:30 )    Color: x / Appearance: x / SG: x / pH: x  Gluc: 121 mg/dL / Ketone: x  / Bili: x / Urobili: x   Blood: x / Protein: x / Nitrite: x   Leuk Esterase: x / RBC: x / WBC x   Sq Epi: x / Non Sq Epi: x / Bacteria: x      CAPILLARY BLOOD GLUCOSE  POCT Blood Glucose.: 141 mg/dL (13 Mar 2024 12:04)  POCT Blood Glucose.: 118 mg/dL (13 Mar 2024 06:17)  POCT Blood Glucose.: 128 mg/dL (12 Mar 2024 23:58)        RADIOLOGY & ADDITIONAL TESTS: Reviewed.    Consultant(s) Notes Reviewed:  [x ] YES  [ ] NO    MEDICATIONS  (STANDING):  atorvastatin 20 milliGRAM(s) Oral at bedtime  chlorhexidine 2% Cloths 1 Application(s) Topical <User Schedule>  levothyroxine 50 MICROGram(s) Oral daily  pancrelipase  (CREON 24,000 Lipase Units) 1 Capsule(s) Oral three times a day with meals  Parenteral Nutrition - Adult 1 Each TPN Continuous <Continuous>  simethicone 80 milliGRAM(s) Chew every 12 hours  venlafaxine XR. 150 milliGRAM(s) Oral every 12 hours    MEDICATIONS  (PRN):  acetaminophen     Tablet .. 650 milliGRAM(s) Oral every 6 hours PRN Temp greater or equal to 38C (100.4F), Mild Pain (1 - 3)  LORazepam     Tablet 0.5 milliGRAM(s) Oral at bedtime PRN Anxiety and/or insomnia        Care Discussed with Consultants/Other Providers [ x] YES  [ ] NO

## 2024-03-13 NOTE — PROGRESS NOTE ADULT - PROBLEM SELECTOR PLAN 7
Home med: venlafaxine 150 mg BID  - Continue home medication
Home med: venlafaxine 150 mg BID  - Continue home medication

## 2024-03-13 NOTE — PROGRESS NOTE ADULT - PROBLEM SELECTOR PLAN 1
Acute blood loss anemia (Hb: 6.3), with components of demand ischemia (Trop 77) and ELVI (Cr. 1.62) - Bleeding from wound bed controlled. s/p 2 units pRBCs in ED.  - No acute surgical intervention per surgery  - s/p cauterization w/ surg at bedside ystday  - Trend hemoglobin.   - Serial wound checks to assess for bleeding  - Repeat labs after blood transfusion  - 2 large bore IVs  - Transfuse to keep hgb >7  - SCDs. Can transition to Lovenox in AM if Hgb stable
Acute blood loss anemia (Hb: 6.3), with components of demand ischemia (Trop 77) and ELVI (Cr. 1.62) - Bleeding from wound bed controlled. s/p 2 units pRBCs in ED.  - No acute surgical intervention per surgery  - evaluate for stoma bed vs GI bleed, if concerned for the latter GI consult  - Trend hemoglobin.   - Serial wound checks to assess for bleeding  - Repeat labs after blood transfusion  - 2 large bore IVs  - Transfuse to keep hgb >7  - SCDs. Can transition to Lovenox in AM if Hgb stable

## 2024-03-13 NOTE — PROGRESS NOTE ADULT - PROBLEM SELECTOR PLAN 5
Diagnosis of AFib/Flutter s/p DCCVs.   Home medication: amiodarone 200 mg QD, lopressor 50 mg BID PO, currently   - Continue amiodarone   - hold beta blocker given active bleed. Resume as appropriate
Diagnosis of AFib/Flutter s/p DCCVs.   Home medication: amiodarone 200 mg QD, lopressor 50 mg BID PO, currently   - Continue amiodarone   - hold beta blocker given active bleed. Resume as appropriate

## 2024-03-13 NOTE — ADVANCED PRACTICE NURSE CONSULT - ASSESSMENT
New Dustin's wound/fistula manager applied over fistula site. Prior to application, area protected well with Cavilon barrier wipes then stoma paste and stoma rings applied near fistula. Healthy granulation tissue noted in wound bed, approx 2-3 cm from fistula. No bleeding noted during procedure. Smaller Dustin's pouch applied rather than the larger one, clip used at bottom of pouch. New 1 3/4" Jayleen 2 piece appliance placed over ileostomy site. Requested medication to manage gas. 
Called d/t bleeding. Pouching system removed by MD; area cauterized and sutures placed around stoma (by MD)    New Dustin's wound/fistula manager applied over fistula site. Prior to application, area protected well with Cavilon barrier wipes then stoma paste and stoma rings applied near fistula. Small Dustin pouch with clamp. Output is thick, brown.
Wound manager had begun leaking so new one applied. Denudement has spread to larger area due to leakage so larger Dustin's used. Bonding cement used on intact skin beneath appliance, stoma rings and stoma paste applied prior to placing appliance. Home nurses informed of the above change in procedure. Extra supplies to be sent home with patient.

## 2024-03-13 NOTE — PROGRESS NOTE ADULT - SUBJECTIVE AND OBJECTIVE BOX
24 hr events:  3/12: stoma bleeding, dr duran stitched it, cauterize  o/n: AMRITA    SUBJECTIVE: Patient seen at bedside sleeping peacefully. No CP, SOB, fevers or chills. no bleeding from wound manager.     Vital Signs Last 24 Hrs  T(C): 36.6 (13 Mar 2024 01:00), Max: 36.6 (13 Mar 2024 01:00)  T(F): 97.8 (13 Mar 2024 01:00), Max: 97.8 (13 Mar 2024 01:00)  HR: 102 (13 Mar 2024 03:44) (98 - 108)  BP: 128/61 (13 Mar 2024 03:44) (99/54 - 128/61)  BP(mean): 88 (13 Mar 2024 03:44) (73 - 88)  RR: 18 (13 Mar 2024 03:44) (17 - 19)  SpO2: 93% (13 Mar 2024 03:44) (93% - 97%)    Parameters below as of 13 Mar 2024 03:44  Patient On (Oxygen Delivery Method): room air        Physical Exam:  General: NAD  Pulmonary: Nonlabored breathing, no respiratory distress  Cardiovascular: NSR  Abdominal: soft, non distended, non tender to palpation on all four quadrants. Midline wound manager intact with no blood. Healthy granulation tissue throughout. EC fistula with green, thick output.   Extremities: WWP, SCDs in place  Lines/drains/tubes:    I&O's Summary    11 Mar 2024 07:01  -  12 Mar 2024 07:00  --------------------------------------------------------  IN: 0 mL / OUT: 650 mL / NET: -650 mL    12 Mar 2024 07:01  -  13 Mar 2024 06:30  --------------------------------------------------------  IN: 404 mL / OUT: 1370 mL / NET: -966 mL        LABS:                        8.6    10.90 )-----------( 178      ( 13 Mar 2024 05:30 )             26.0     03-12    134<L>  |  103  |  36<H>  ----------------------------<  93  4.1   |  24  |  1.57<H>    Ca    8.1<L>      12 Mar 2024 05:30  Phos  4.0     03-12  Mg     2.0     03-12    TPro  6.8  /  Alb  1.9<L>  /  TBili  4.2<H>  /  DBili  x   /  AST  167<H>  /  ALT  94<H>  /  AlkPhos  117  03-12    PT/INR - ( 11 Mar 2024 13:33 )   PT: 14.8 sec;   INR: 1.31          PTT - ( 11 Mar 2024 13:33 )  PTT:32.5 sec  Urinalysis Basic - ( 12 Mar 2024 05:30 )    Color: x / Appearance: x / SG: x / pH: x  Gluc: 93 mg/dL / Ketone: x  / Bili: x / Urobili: x   Blood: x / Protein: x / Nitrite: x   Leuk Esterase: x / RBC: x / WBC x   Sq Epi: x / Non Sq Epi: x / Bacteria: x      CAPILLARY BLOOD GLUCOSE      POCT Blood Glucose.: 118 mg/dL (13 Mar 2024 06:17)  POCT Blood Glucose.: 128 mg/dL (12 Mar 2024 23:58)  POCT Blood Glucose.: 113 mg/dL (12 Mar 2024 12:05)    LIVER FUNCTIONS - ( 12 Mar 2024 05:30 )  Alb: 1.9 g/dL / Pro: 6.8 g/dL / ALK PHOS: 117 U/L / ALT: 94 U/L / AST: 167 U/L / GGT: x             RADIOLOGY & ADDITIONAL STUDIES:

## 2024-03-13 NOTE — PROGRESS NOTE ADULT - ASSESSMENT
78M w PMH Crohn's, AFib/Flutter s/p DCCVs, remote ileocectomy and open appendectomy with prolonged stay at [facility] now re-admitted for anemia 2/2 blood loss from wound site being medically managed.

## 2024-03-13 NOTE — ADVANCED PRACTICE NURSE CONSULT - RECOMMEDATIONS
Pt to be discharged today. Pt to be discharged today. Total amount of time spent 2.5 hours x 2 WOCNs

## 2024-03-19 NOTE — H&P ADULT - PROBLEM SELECTOR PLAN 1
blood oozing from ostomy site for less than a week leading up to outpatient lab check  hg found to be 6.1. patient sent into hospital for transfusion.   Stefania consulted in ED, wants 3 units of pRBCs. ED policy states patient needs admission for the number of and time required for transfusion    - complete transfusions,   - f/u post transfusion CBC  - discharge today if no complications

## 2024-03-19 NOTE — DISCHARGE NOTE PROVIDER - HOSPITAL COURSE
78 yr male, history of AFib/Flutter s/p DCCV, Crohn's, prior ileocectomy and open appendectomy, extensive abdominal surgeries and ileostomy, short-bowel syndrome, hypothyroidism, depression, presents with acute blood loss anemia      #Anemia due to acute blood loss.   ·  Plan: blood oozing from ostomy site for less than a week leading up to outpatient lab check  hg found to be 6.1. patient sent into hospital for transfusion.   Stefania consulted in ED, wants 3 units of pRBCs. ED policy states patient needs admission for the number of and time required for transfusion  post transfusion CBC =     - completed 3 pRBC transfusions  - discharge today if no complications.

## 2024-03-19 NOTE — DISCHARGE NOTE PROVIDER - NSDCFUSCHEDAPPT_GEN_ALL_CORE_FT
[facility] Physician [facility]  [facility] [address]  Scheduled Appointment: 03/29/2024

## 2024-03-19 NOTE — ED PROVIDER NOTE - PROGRESS NOTE DETAILS
santo -  hgb 6.3 / hct 20.1. remainder of labs ok.   discussed w dr fontenot. will transfuse 2un and plan for dc and continued outpt management as pt not actively bleeding.   dr fontenot to come see pt this morning prior to his discharge.   first unit done, 2nd unit pending. signed out to day team pending 2nd unit and dc. Director - pt received from [facility].  VS, labs reviewed.  Pt resting comfortably, denies bleeding, rbc running.  Pt discussed w Dr Aguiar who wants 3 u for pt.  Will admit w plan for transfusion and dc when done.

## 2024-03-19 NOTE — ED PROVIDER NOTE - PHYSICAL EXAMINATION
Constitutional : pale, nontoxic appearing. no acute distress. awake, alert, oriented to person, place, time/situation.  Head : head normocephalic, atraumatic  EENMT : eyes clear bilaterally, PERRL, EOMI. airway patent. moist mucous membranes. neck supple.  Cardiac : Normal rate, regular rhythm. No murmur appreciated, no LE edema.  Resp : Breath sounds clear and equal bilaterally. Respirations even and unlabored.   Gastro : abdomen soft, nontender. ostomy L abdomen - no bloody in output. no bleeding from surrounding site. no surrounding redness. green output.   MSK :  range of motion is not limited, no muscle or joint tenderness  Back : No evidence of trauma. No spinal or CVA tenderness.  Vasc : Extremities warm and well perfused. 2+ radial and DP pulses. cap refill <2 seconds  Neuro : Alert and oriented, CNII-XII grossly intact, no motor or sensory deficits.  Skin : Skin normal color for race, warm, dry and intact. No evidence of rash.  Psych : Alert and oriented to person, place, time/situation. normal mood and affect. no apparent risk to self or others.

## 2024-03-19 NOTE — ED PROVIDER NOTE - CLINICAL SUMMARY MEDICAL DECISION MAKING FREE TEXT BOX
history of AFib/Flutter s/p DCCV, Crohn's, prior ileocectomy and open appendectomy, extensive abdominal surgeries and ileostomy, short-bowel syndrome, hypothyroidism, depression, Recent admission 3/11-3/13 for anemia 2/2 blood loss from ileostomy wound. s/p 3un pRBCs. yesterday had recurrent bleeding from ostomy site. was able to stop it on his own w his aide at home. had blood work drawn that resulted w hemoglobin of 6. feels weak / run down.   pmd dr fontenot (aware pt in ED)  pt pale, nontoxic appearing, HR 109bpm, vitals otherwise ok, ostomy w/o active bleeding from surrounding site and no blood in output. abd soft, nontender.     symptomatic anemia from known source of bleeding from ostomy.   no current bleeding  will check labs, transfuse prn  discuss dispo plan w dr fontenot.

## 2024-03-19 NOTE — ED PROVIDER NOTE - ATTENDING APP SHARED VISIT CONTRIBUTION OF CARE
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

## 2024-03-19 NOTE — ED PROVIDER NOTE - NSFOLLOWUPINSTRUCTIONS_ED_ALL_ED_FT
Todays Labs -   HEMOGLOBIN 6.3 // HEMATOCRIT 20.1    You were given 2 units of blood in the ED.     Continue all medications as previously prescribed.     Follow up with Dr Aguiar as planned.     Return for any new or worsening symptoms including bleeding from site, chest pain, difficulty breathing, heart racing, feeling like you might pass out or any other concerning symptoms.

## 2024-03-19 NOTE — PATIENT PROFILE ADULT - FALL HARM RISK - HARM RISK INTERVENTIONS

## 2024-03-19 NOTE — ED ADULT NURSE REASSESSMENT NOTE - NS ED NURSE REASSESS COMMENT FT1
Pt c/o ostomy bag needing to be emptied, upon assessment drainage bag was emptied but there is leakage around bag. Pt requesting ostomy nurse from upstairs to change bag. Pt cleaned with towels placed around site to keep pt clean prior to changing bag.
Pt due for second unit of blood, blood not given d/t delay in blood bank.
Report received from nightshift RN, pt is A&Ox4, breathing even and unlabored, resting comfortably in stretcher, updated on plan of care.
Patient tolerated first unit of pRBC well without any rx, denies chest pain/sob. Made aware that he is pending 2 more units of pRBC and is admitted, verbalized understanding.

## 2024-03-19 NOTE — ADVANCED PRACTICE NURSE CONSULT - ASSESSMENT
Pt well known to [facility] RN team. Readmitted d/t low Hgb, no active bleeding from ostomy.    Midline fistula: Output thin, brown. Stomatized fistula heathy red. Denuded area from 4-12 o'clock, stable red superificial. Area cleansed, crusted with powder and Cavilon. Stoma paste applied. Fistula isolated with Crown, sealed with rings. Dustin pouch applied; clamp applied.  RLQ ileostomy: pouch intact, small amt of thin yellow effluent in pouch, no leakage.

## 2024-03-19 NOTE — DISCHARGE NOTE PROVIDER - NSDCMRMEDTOKEN_GEN_ALL_CORE_FT
allopurinol 300 mg oral tablet: 1 tab(s) orally once a day  atorvastatin 20 mg oral tablet: 1 tab(s) orally once a day (at bedtime)  ChloraPrep One-Step 2% topical pad: Apply topically to affected area once a day 1 Apply topically to affected area  intravenous electrolyte (Lypholyte II/Nutrilyte II/TPN Electrolytes) solution: 1 each intravenous once a day  Lopressor 50 mg oral tablet: 1 tab(s) orally 2 times a day  Lovenox 40 mg/0.4 mL injectable solution: 40 milligram(s) subcutaneously once a day  pancrelipase 24,000 units-76,000 units-120,000 units oral delayed release capsule: 1 cap(s) orally 3 times a day (with meals)  simethicone 80 mg oral tablet, chewable: 1 tab(s) orally every 12 hours  Synthroid 50 mcg (0.05 mg) oral tablet: 1 tab(s) orally once a day  teduglutide 5 mg subcutaneous kit: 0.05 mg/kg subcutaneously once a day  venlafaxine 150 mg oral tablet, extended release: 1 tab(s) orally 2 times a day

## 2024-03-19 NOTE — H&P ADULT - NSHPLABSRESULTS_GEN_ALL_CORE
LABS:                          6.3    8.33  )-----------( 195      ( 19 Mar 2024 02:29 )             20.1     03-19    133<L>  |  100  |  36<H>  ----------------------------<  131<H>  3.8   |  26  |  1.24    Ca    8.0<L>      19 Mar 2024 02:29      PT/INR - ( 19 Mar 2024 02:29 )   PT: 14.3 sec;   INR: 1.26          PTT - ( 19 Mar 2024 02:29 )  PTT:31.4 sec

## 2024-03-19 NOTE — DISCHARGE NOTE PROVIDER - NSDCCPCAREPLAN_GEN_ALL_CORE_FT
PRINCIPAL DISCHARGE DIAGNOSIS  Diagnosis: Symptomatic anemia  Assessment and Plan of Treatment: Anemia is the medical term for when a person has too few red blood cells. Red blood cells are the cells in your blood that carry oxygen. If you have too few red blood cells, your body does not get all the oxygen it needs. Most people with anemia have no symptoms. They find out they have it after their doctor does blood tests for another reason. People who do have symptoms might feel tired or weak, especially if they try to exercise or have headaches. If you experience these symptoms you should see your primary care provider for further evaluation.

## 2024-03-19 NOTE — DISCHARGE NOTE PROVIDER - CARE PROVIDER_API CALL
Daniel Aguiar  Internal Medicine  [address] [place] [id number]  Phone: [phone]  Fax: [phone]  Established Patient  Follow Up Time: 1 week

## 2024-03-19 NOTE — DISCHARGE NOTE NURSING/CASE MANAGEMENT/SOCIAL WORK - NSDCVIVACCINE_GEN_ALL_CORE_FT
influenza, high-dose, quadrivalent; 23-Jan-2024 11:29; Deandra Mackay (RN); [employer]; [id number] (Exp. Date: 30-Jun-2024); IntraMuscular; Deltoid Left.; 0.7 milliLiter(s); VIS (VIS Published: 06-Aug-2021, VIS Presented: 23-Jan-2024);

## 2024-03-19 NOTE — DISCHARGE NOTE NURSING/CASE MANAGEMENT/SOCIAL WORK - NSDCPELOVENOXDIET_GEN_ALL_CORE
Eat healthy foods you enjoy. Enoxaparin/Lovenox DOES NOT have a special diet. Limit your alcohol intake.
None

## 2024-03-19 NOTE — ED PROVIDER NOTE - OBJECTIVE STATEMENT
78 yr male, history of AFib/Flutter s/p DCCV, Crohn's, prior ileocectomy and open appendectomy, extensive abdominal surgeries and ileostomy, short-bowel syndrome, hypothyroidism, depression, presents to the Emergency Department w low hemoglobin. Recent admission 3/11-3/13 for anemia 2/2 blood loss from ileostomy wound. s/p 3un pRBCs. ostomy stitched and cauterized by vascular doing admission. had been doing well since discharge. yesterday had recurrent bleeding from ostomy site. was able to stop it on his own w his aide at home. had blood work drawn that resulted w hemoglobin of 6. feels weak / run down. told by pmd dr fontenot to come for transfusion.   no currently bleeding. no other sources of bleeding. no dark / bloody stools. no fever, change in ostomy output. no palpitations, cp, sob, near-syncope / syncope.

## 2024-03-19 NOTE — H&P ADULT - NSHPPHYSICALEXAM_GEN_ALL_CORE
General: NAD  Head: NC/AT; MMM; PERRL; EOMI;  Neck: Supple; no JVD  Respiratory: CTAB; no wheezes/rales/rhonchi  Cardiovascular: Regular rhythm/rate; S1/S2+, no murmurs, rubs gallops   Gastrointestinal: ileostomy w/ evidence of recent blood oozing.   Extremities: WWP; no edema/cyanosis  Neurological: A&Ox3, CNII-XII grossly intact; no obvious focal deficits  Integument: intact; normal turgor, texture, temperature, color for age

## 2024-03-19 NOTE — ED ADULT TRIAGE NOTE - CHIEF COMPLAINT QUOTE
low hgb ( was told it was 6.__ yesterday) ; has been feeling weak this past week; denies signs of active bleed

## 2024-03-19 NOTE — H&P ADULT - ASSESSMENT
78 yr male, history of AFib/Flutter s/p DCCV, Crohn's, prior ileocectomy and open appendectomy, extensive abdominal surgeries and ileostomy, short-bowel syndrome, hypothyroidism, depression, presents with acute blood loss anemia

## 2024-03-19 NOTE — ED ADULT NURSE NOTE - NSFALLRISKINTERV_ED_ALL_ED

## 2024-03-19 NOTE — DISCHARGE NOTE NURSING/CASE MANAGEMENT/SOCIAL WORK - PATIENT PORTAL LINK FT
You can access the FollowMyHealth Patient Portal offered by [facility] by registering at the following website: [email or web address]. By joining [employer]’s FollowMyHealth portal, you will also be able to view your health information using other applications (apps) compatible with our system.

## 2024-03-26 NOTE — ED PROVIDER NOTE - OBJECTIVE STATEMENT
78 yr male, history of AFib/Flutter s/p DCCV, Crohn's, prior ileocectomy and open appendectomy, extensive abdominal surgeries and ileostomy, short-bowel syndrome, hypothyroidism, depression, presents to the Emergency Department w low hemoglobin. Recent admission 3/11-3/13 for anemia 2/2 blood loss from ileostomy wound. s/p 3un pRBCs. ostomy stitched and cauterized by vascular doing admission. had been doing well since discharge. yesterday had recurrent bleeding from ostomy site. was able to stop it on his own w his aide at home but happened again this am when he stood up, couldn't stop it, so came in for evaluation.

## 2024-03-26 NOTE — CHART NOTE - NSCHARTNOTEFT_GEN_A_CORE
Communication with ED attending, pt & private nurse. Pt to be discharged after second unit PRBCs. Per ED attending, no labs tonight after completed transfusions & pt can have his regularly scheduled blood work tomorrow at home. Pt & nurse aware. As discussed, pt requesting ambulance transportation home with nurse at 9pm. [facility] ambulance transportation arranged for 9pm/21:00 pickup to home, trip#  [id number] [phone] by ED SW. CM remains available.

## 2024-03-26 NOTE — ED PROVIDER NOTE - CHIEF COMPLAINT
----- Message from Marylu Wisdom sent at 7/6/2023  3:28 PM CDT -----  Regarding: patient Call  .Type:  Patient Returning Call    Who Called:pt  Who Left Message for Patient:office staff   Does the patient know what this is regarding?:medical clearance form  Would the patient rather a call back or a response via [facility]?   Best Call Back Number:[phone]  Additional Information: pt is calling for medical clearance form to be sent to Dr. Burch's , please advise        
Spoke with patient   
The patient is a 78y Male complaining of

## 2024-03-26 NOTE — ED PROVIDER NOTE - NSFOLLOWUPINSTRUCTIONS_ED_ALL_ED_FT
Your hemoglobin remained stable at 8.3 today.     Blood Transfusion, Adult  A blood transfusion is a procedure in which you receive blood or a type of blood cell (blood component) through an IV. You may need a blood transfusion when you have a low blood count, which is a low number of any blood cell. This may result from a bleeding disorder, illness, injury, or surgery. The blood may come from a donor, or you may be able to have your own blood collected and stored (autologous blood donation) before a planned surgery.    The blood given in a transfusion may be made up of different blood components. You may receive:  Red blood cells. These carry oxygen to the cells in the body.  Platelets. These help your blood to clot.  Plasma. This is the liquid part of your blood. It carries proteins and other substances throughout the body.  White blood cells. These help you fight infections.  If you have hemophilia or another clotting disorder, you may also receive other types of blood products.    Depending on the type of blood product, this procedure may take 1–4 hours to complete.    Tell a health care provider about:  Any bleeding problems you have.  Any previous reactions you have had during a blood transfusion.  Any allergies you have.  All medicines you are taking, including vitamins, herbs, eye drops, creams, and over-the-counter medicines.  Any surgeries you have had.  Any medical conditions you have.  Whether you are pregnant or may be pregnant.  What are the risks?  Talk with your health care provider about risks.  The most common problems include:  A mild allergic reaction, such as red, swollen areas of skin (hives) and itching.  Fever or chills. This may be the body's response to new blood cells received. This may occur during or up to 4 hours after the transfusion.  More serious problems may include:  A serious allergic reaction that causes difficulty breathing or swelling around the face and lips.  Transfusion-associated circulatory overload (TACO), or too much fluid in the lungs. This may cause breathing problems.  Transfusion-related acute lung injury (TRALI), which causes breathing difficulty and low oxygen in the blood. This can occur within hours of the transfusion or several days later.  Iron overload. This can happen after receiving many blood transfusions over a period of time.  Infection or virus being transmitted. This is rare because donated blood is carefully tested before it is given.  Hemolytic transfusion reaction. This is rare. It happens when the body's defense system (immune system)tries to attack the new blood cells. Symptoms may include fever, chills, nausea, low blood pressure, and low back or chest pain.  Transfusion-associated wmqob-aarzfz-zzkd disease (TAGVHD). This is rare. It happens when donated cells attack the body's healthy tissues.  What happens before the procedure?  You will have a blood test to check your blood type. This test is done to know what kind of blood your body will accept and to match it to the donor blood.  If you are going to have a planned surgery, you may be able to do an autologous blood donation. This may be done in case you need to have a transfusion.  You will have your temperature, blood pressure, and pulse checked before the transfusion.  If you have had an allergic reaction to a transfusion in the past, you may be given medicine to help prevent a reaction. This medicine may be given to you by mouth (orally) or through an IV.  What happens during the procedure?  A person receiving a blood transfusion through a vein in the arm.  An IV will be inserted into one of your veins.  The bag of blood will be attached to your IV. The blood will then enter through your vein.  Your temperature, blood pressure, and pulse will be monitored during the transfusion. This monitoring is done to detect early signs of a transfusion reaction.  Tell your nurse right away if you have any of these symptoms during the transfusion:  Shortness of breath or trouble breathing.  Chest or back pain.  Fever or chills.  Itching or hives.  If you have any signs or symptoms of a reaction, your transfusion will be stopped and you may be given medicine.  When the transfusion is complete, your IV will be removed.  Pressure may be applied to the IV site for a few minutes.  A bandage (dressing)will be applied.  The procedure may vary among health care providers and hospitals.    What happens after the procedure?  Your temperature, blood pressure, pulse, breathing rate, and blood oxygen level will be monitored until you leave the hospital or clinic.  Your blood may be tested to see how you have responded to the transfusion.  You may be warmed with fluids or blankets to maintain a normal body temperature.  If you receive your blood transfusion in an outpatient setting, you will be told whom to contact to report any reactions.  Where to find more information  Visit the [employer] [place]: [email or web address]  Summary  A blood transfusion is a procedure in which you receive blood or a type of blood cell (blood component) through an IV.  The blood given in a transfusion may be made up of different blood components. You may receive red blood cells, platelets, plasma, or white blood cells depending on the condition treated.  Your temperature, blood pressure, and pulse will be monitored before, during, and after the transfusion.  After the transfusion, your blood may be tested to see how your body has responded.  This information is not intended to replace advice given to you by your health care provider. Make sure you discuss any questions you have with your health care provider

## 2024-03-26 NOTE — CHART NOTE - NSCHARTNOTEFT_GEN_A_CORE
Pt to the ED with low H&H (5.9/18.7). Pt seen by our WOCN in the ED & pt reported recent cauterization secondary to stomal bleeding. WOCN states minimal bleeding from stoma at this time. Communication with ED attending & the plan is to transfuse 2u prbcs & discharge.

## 2024-03-26 NOTE — ED ADULT NURSE NOTE - NSFALLUNIVINTERV_ED_ALL_ED
Bed/Stretcher in lowest position, wheels locked, appropriate side rails in place/Call bell, personal items and telephone in reach/Instruct patient to call for assistance before getting out of bed/chair/stretcher/Non-slip footwear applied when patient is off stretcher/[place] to call system/Physically safe environment - no spills, clutter or unnecessary equipment/Purposeful proactive rounding/Room/bathroom lighting operational, light cord in reach

## 2024-03-26 NOTE — ED PROVIDER NOTE - CLINICAL SUMMARY MEDICAL DECISION MAKING FREE TEXT BOX
seen immediately by surgery   bleeding stopped at bedside  cbc with low hemoglobin, will plan for 2u PRBC

## 2024-03-26 NOTE — CONSULT NOTE ADULT - SUBJECTIVE AND OBJECTIVE BOX
SURGERY CONSULT  ==============================================================================================================  HPI: 78M w PMH Crohn's, AFib/Flutter s/p DCCVs on amiodarone, remote ileocectomy and open appendectomy. Admitted (6/23) for SBO vs Crohns flare, s/p NGT decompression and s/p lap converted to open TRE, SBR x 3, left in discontinuity with abthera vac on (6/27), RTOR for ileocolic resection, small bowel anastomosis, and abdominal wall closure on (6/28), c/b fluid collection s/p IR aspiration of perihepatic fluid on (7/3), c/b wound dehiscence s/p RTOR exlap, washout, ileocolic resection with end ileostomy, blow hole colostomy, red rubber from ileostomy to small bowel anastomosis; vicryl bridging mesh on (7/5) transferred to SICU postoperatively for hemodynamic monitoring, with hospital course complicated by periods of severe ELVI and hyponatremia, which resolved but stepped back up for to SICU on (9/10) for acute AMS, intermittent hypoglycemia, AFib with RVR. Percutaneous Cholecystostomy placed on (9/11) for enlarged GB, PCT capped 10/5 and uncapped 10/25 for hyperbilirubinemia, Right brachial DVT, left basillic and cephalic superficial thrombus on duplex 11/2, CT 11/14 with ALDEN ground glass opacity of unclear etiology, completed empiric 7day cefepime course 11/22, and another course of 7day cefepime for PNA  (1/15-23), hyperbilirubinemia of unclear etiology, resolved candida glabrata fungemia, ELVI resolved, second episode of sinus pause--pacemaker placed (2/5). cystic duct open, Perc cony removed 2/8. Discharged home 2/12/24. Recently presented to ED twice for anemia requiring PRBC transfusion and dischgarged. Readmitted on 2/28/2 4 for generalized weakness, decreased PO intake, nbnb vomiting, nausea and increased stoma output with concerns for sepsis, with cholecystitis high in differential, treated with cefepime 2g for 3 days and discharged on 3/3/24. Readmitted 3/19 for same complaints of bleeding from wound bed. Now presents with bleeding from wound bed (750cc according to home nurse) and fatigue. Denies any fevers, chills, headache, chest pain, palpitations, cough, runny nose, abdominal pain, nausea, vomiting, changes in EC fistula output, urinary symptoms.     In the ED patient seen and appreciated somnolent, mildly tachycardic to 105-110, /72 in triage room, saturating appropriately on RA, afebrile     On exam, patient with improving jaundice (compared to previous exams), AOx3 but somnolent.   Abdomen soft, non distended, non tender to palpation on all four quadrants. Midline wound manager with large amount of blood (1000c evacuated with clots). Wound cleansed and two bleeding points from wound bed identified and cauterized with silver nitrate/Bovie. Healthy granulation tissue throughout. EC fistula with green, thick output.     Labs significant for Hb 5.9, WBC 11, , Cr. 1.30    UA: wnl     In ED 1L NS bolus along with transfusion of 2PRBC, surgery team along with wound care team with successful attempt at controlling wound bed bleeding       PAST MEDICAL & SURGICAL HISTORY:  Essential hypertension  Hypertension  Atrial fibrillation  s/p cardioversion 2010 and 2014  Pt. reports 4 DCCV  Now on Amiodarone 200mg PO bid and Eliquis 5mg PO bid  Last DCCV 4 yrs ago at [facility]  Crohn's disease  s/p partial resection of ileum  Hyperlipidemi  Hypothyroidism  History of depression  On Venlafaxine ER 150mg PO bid  Junctional rhythm  Bradycardia  H/O knee surgery  History of cataract surgery    Home Meds: Home Medications:  allopurinol 300 mg oral tablet: 1 tab(s) orally once a day (28 Feb 2024 22:11)  atorvastatin 20 mg oral tablet: 1 tab(s) orally once a day (at bedtime) (12 Mar 2024 14:27)  ChloraPrep One-Step 2% topical pad: Apply topically to affected area once a day 1 Apply topically to affected area (13 Mar 2024 14:29)  Lopressor 50 mg oral tablet: 1 tab(s) orally 2 times a day (13 Mar 2024 14:29)  teduglutide 5 mg subcutaneous kit: 0.05 mg/kg subcutaneously once a day (28 Feb 2024 22:38)  venlafaxine 150 mg oral tablet, extended release: 1 tab(s) orally 2 times a day (28 Feb 2024 22:11)    Allergies: Allergies    penicillin (Angioedema)    Intolerances      Soc:   Advanced Directives: Presumed Full Code     CURRENT MEDICATIONS:   --------------------------------------------------------------------------------------  Neurologic Medications    Respiratory Medications    Cardiovascular Medications    Gastrointestinal Medications    Genitourinary Medications    Hematologic/Oncologic Medications    Antimicrobial/Immunologic Medications    Endocrine/Metabolic Medications    Topical/Other Medications    --------------------------------------------------------------------------------------    VITAL SIGNS, INS/OUTS (last 24 hours):  --------------------------------------------------------------------------------------  ICU Vital Signs Last 24 Hrs  T(C): 36.8 (26 Mar 2024 18:23), Max: 36.9 (26 Mar 2024 16:10)  T(F): 98.3 (26 Mar 2024 18:23), Max: 98.4 (26 Mar 2024 16:10)  HR: 106 (26 Mar 2024 18:23) (104 - 109)  BP: 122/72 (26 Mar 2024 18:23) (99/63 - 123/74)  BP(mean): --  ABP: --  ABP(mean): --  RR: 18 (26 Mar 2024 18:23) (16 - 20)  SpO2: 96% (26 Mar 2024 18:23) (95% - 98%)    O2 Parameters below as of 26 Mar 2024 18:23  Patient On (Oxygen Delivery Method): room air          I&O's Summary    --------------------------------------------------------------------------------------    EXAM:      LABS  --------------------------------------------------------------------------------------  Labs:  CAPILLARY BLOOD GLUCOSE                              5.9    11.08 )-----------( 172      ( 26 Mar 2024 12:00 )             18.7       Auto Neutrophil %: 85.8 % (03-26-24 @ 12:00)    03-26    133<L>  |  101  |  39<H>  ----------------------------<  107<H>  3.9   |  26  |  1.30      Calcium: 8.0 mg/dL (03-26-24 @ 12:00)    LFTs:    Coags:    Urinalysis Basic - ( 26 Mar 2024 12:00 )    Color: x / Appearance: x / SG: x / pH: x  Gluc: 107 mg/dL / Ketone: x  / Bili: x / Urobili: x   Blood: x / Protein: x / Nitrite: x   Leuk Esterase: x / RBC: x / WBC x   Sq Epi: x / Non Sq Epi: x / Bacteria: x      ASSESSMENT/ PLAN:  78M w PMH Crohn's, AFib/Flutter s/p DCCVs on amiodarone, remote ileocectomy and open appendectomy. Admitted (6/23) for SBO vs Crohns flare, s/p NGT decompression and s/p lap converted to open TRE, SBR x 3, left in discontinuity with abthera vac on (6/27), RTOR for ileocolic resection, small bowel anastomosis, and abdominal wall closure on (6/28), c/b fluid collection s/p IR aspiration of perihepatic fluid on (7/3), c/b wound dehiscence s/p RTOR exlap, washout, ileocolic resection with end ileostomy, blow hole colostomy, red rubber from ileostomy to small bowel anastomosis; vicryl bridging mesh on (7/5) and was kept in the SICU for multiple different ailments as well as for aggressive wound care. Patient was discharged home 2/12/24. Returned 2/16 to ED for Hgb in 6s, s/p 2 unit prbcs. Patient now presents after granulation tissue surrounding ECF continues to ooze, found to have Hgb 5.9 (7.3). Patient was seen by wound care nursing team and ECF pouch was changed after achieving hemostasis of small area of oozing just right inferolateral to ECF w/ hemostatic powder, transfused 1 unit pRBC.     Plan:  - No acute surgical intervention at this time  - Will tranfuse 2 unit pRBC in setting of symptoms and active slow bleeding from pouch  - Wound care nursing team to see patient for hemostasis  - Dispo per ED after completion of transfusions  Plan discussed with attending surgeon, Dr. Peterson        Attending aware and agrees with plan

## 2024-03-26 NOTE — ADVANCED PRACTICE NURSE CONSULT - ASSESSMENT
Pt seen in ED after being brought for blood transfusion. Fistula appliance had leaked, moderate amount of blood in drainage bag. New Coloplast wound/fistula manager applied with private duty RN at bedside. Entire periwound protected with Cavilon barrier wipes then stoma rings applied around denuded area as well as fistula. New appliance placed over site.

## 2024-03-26 NOTE — ED ADULT TRIAGE NOTE - GLASGOW COMA SCALE: BEST MOTOR RESPONSE, MLM
[facility] [place] [facility]         Critical Care Progress Note    Patient: Gaetano Bernal Date of Service: 3/22/2019   male, 89 year old  Admit Date: 3/14/2019   Attending: Flaquito Orlando MD      Patient Description:  89 year old year old male admitted with:    Principal Problem:    Right-sided nontraumatic intracerebral hemorrhage (CMS/HCC)  Active Problems:    AF (atrial fibrillation) (CMS/HCC)    Anemia in CKD (chronic kidney disease)    Stage 4 chronic kidney disease (CMS/HCC)    Essential hypertension    Current use of long term anticoagulation    Severe protein-calorie malnutrition (CMS/HCC)    Hypernatremia    Pulmonary hypertension, moderate to severe (CMS/HCC)    Hemorrhagic stroke (CMS/HCC)    Resolved Problems:    * No resolved hospital problems. *       ICU Interval History  Gaetano Bernal is a 89 year old male who was admitted to the ICU on 3/14 through the ER. He was brought to the ER after developing garbled speech and weakness this morning.  In the ER he had a CT scan that revealed large right basal ganglia hemorrhage measuring 5.3x 2.3 cm.  He was on chronic coumadin and did receive PCC to reverse. He was seen by interventional neurology and neurosurgery and non-surgical management recommended.  He was hypertensive and required cardene infusion for blood pressure control.  He underwent followup CT head on 3/15 that was stable.  He was seen by pt/ot/speech therapies.      Patient with fluctuating mental status with slow improvement.  He was seen by nephrology for evaluation of chronic renal failure.  Patient developed respiratory distress and improved with lasix and bipap.  Repeat CT head peformed on 3/18.      3/19   Mental status slightly improved, speech was clearer this afternoon.  Still with fluctuating mental status.  Cardene at 3 mg/hr.  Norvasc increased to 10.  Lopressor converted to po.  Sodium improved to 149, D5W decreased to 40.  Discussed with 2 daughters regarding NPO status,  continue with NG tube at present.  Encouraged family discussion regarding code status.      3/20/19   Off Nicardipine, D5W continues at 40 ml/hr. Na+ 146 and creatinine 2.58. Somnolent, tachypnea, audible rales, Changed NG tube today. CXR Right Pneumonia. Request for Palliative Care consult declined by Family.    Had multiple discussions with Family members, including DEYANIRA daughter Neida. Decided that we should treat his new pneumonia and evaluate his condition in 48 hours. They did agree to DNR status and stated that they knew he would not want to kept alive by machines.    D5W continues at 40 ml/hr. Na+ 144 and creatinine 2.57. More arousable, tachypnea, audible rales. Transfused 1 unit PRBC Hgb 7.4 to 8.4.  Continued Zosyn. Swallowing and need for PEG undetermined at this time, continue NG small bore feeding tube.    24 HOUR Summary:  3/21/19   Hospital Day # 8. More alert again this AM. Rales improved. Stopped D5W, Na+ remained 144. Give another 60 mg IV Lasix today. Received  A total of 10 doses of Keppra. Discussed with Dr. Pryor and bettina to remain off Keppra and start SQ Lovenox DVT prophylaxis. Advance PT / OT therapies as tolerated. Transfer to Floor.    I have had a long discussion with Neida MCMILLAN and other family members today. They are leaning toward Comfort Measures, but were hoping Ed could answer the question of Nursing Home and PEG for himself. His answers were inconsistent. I do not think he has insight or understanding, no decision making capacity. It was clear that his family thinks that this is not the quality of life Ed would want for himself.    I advocated for a State DNR Status.    2:55 PM Family chose Comfort Measures and does not want Hospice for personal reasons.    Modified [place] Score 5    In / Out  2535 / 2066  Urine 2065  Emesis 1  Pos 469  Pos 11,134 Total      PHYSICAL EXAM    Vital 24 Hour Range Most Recent Value   Temperature Temp  Min: 97.5 °F (36.4 °C)  Max: 100.1 °F (37.8  °C) 97.5 °F (36.4 °C)   Pulse Pulse  Min: 59  Max: 133 61   Respiratory Resp  Min: 23  Max: 42 29   Blood Pressure BP  Min: 129/59  Max: 180/72 129/59   Pulse Oximetry SpO2  Min: 95 %  Max: 99 % 96 %   Art. BP No Data Recorded     O2 No Data Recorded       Vital Most Recent Value First Value   Weight 79.5 kg Weight: 74.4 kg   Height 5' 7\" (170.2 cm) Height: 5' 7\" (170.2 cm)   BMI 27.45 N/A     Examination:  Appearence: 89 year old year old male who appears   Neurologic:  More alert to person and date, thinks he's at home. Right side stronger than left, but moving both legs and can hand grasp to command on the left.  HEENT: Pupils equal and Pupils reactive to light  Neck:  Supple  Chest:  Symmetric  Lungs:  Scattered rales  Heart:  Regular rate and rhythm  Abdomen: Soft and Nontender, NG tube present  Extremities: Edema present 1+ mor in the left arm. and left sided weakness    Watt to continue due to 24 hour urine collection for acute urinary retention    3/22/19   EXAM: XR CHEST AP OR PA     INDICATION: pneumonia     COMPARISON: March 21, 2019        FINDINGS AND IMPRESSION: Enteric tube tip below the diaphragm. Moderate  right and small left pleural effusions similar to prior. Associated  bibasilar airspace opacities similar to prior. Stable prominent heart size  and pulmonary venous hypertension. Calcified aorta. No pneumothorax.       Plan:  •  Spontaneous ICH   Neurosurgery and Interventional Neurology consulted   Non-surgical management   BP control with Metoprolol, Amlodipine, and Hydralazine    •  Uncontrolled hypertension   Cardene infusion off   Norvasc, Lopressor , Hydralazine per NG tube    •  Chronic renal insufficiency stage IV   Creatinine 2.574 from 2.86   Nephrology consulted, no intervention    •  Hypernatremia   With 5W, Na+ 144 from 155   Stopped D5W    •  Chronic Coumadin use   Received PCC and Vitamin K reversal    •  Diabetes   Lantus   Sliding scale Insulin    *  HCAP / Aspiration  PNA   Started Zosyn 3/20/19.   Room Air    *  Pleural Effusions / Pulmonary Hypertension   Lasix 60 mg IV once, evaluate need daily.   Repeated Lasix 3/22    Code Status:   DNR 3/20/19    DVT Prophylaxis with TEDS and SCD's and Lovenox 40 mg SQ daily  GI Prophylaxis with Nexium  Tube Feeding at 40 ml/hr    I have personally examined the patient and reviewed all pertinent data Evaluation and management time was 25 minutes.  This does not include time spent in procedures and teaching.  Communicated with Nursing Staff and patient    Flaquito Orlando MD  3/22/2019  8:45 AM   (M6) obeys commands

## 2024-03-26 NOTE — ED PROVIDER NOTE - NSICDXPASTMEDICALHX_GEN_ALL_CORE_FT
PAST MEDICAL HISTORY:  Atrial fibrillation s/p cardioversion 2010 and 2014  Pt. reports 4 DCCV  Now on Amiodarone 200mg PO bid and Eliquis 5mg PO bid  Last DCCV 4 yrs ago at [facility]    Bradycardia     Crohn's disease s/p partial resection of ileum    Essential hypertension Hypertension    History of depression On Venlafaxine ER 150mg PO bid    Hyperlipidemia     Hypothyroidism     Junctional rhythm

## 2024-03-26 NOTE — ED ADULT TRIAGE NOTE - CHIEF COMPLAINT QUOTE
pt p/w recurrent bleeding from ileostomy wound. hx of Crohn's, a fib, several abdominal surgeries, and blood transfusions.

## 2024-03-26 NOTE — ED PROVIDER NOTE - PATIENT PORTAL LINK FT
You can access the FollowMyHealth Patient Portal offered by [facility] by registering at the following website: [email or web address]. By joining [facility] FollowMyHealth portal, you will also be able to view your health information using other applications (apps) compatible with our system.

## 2024-03-26 NOTE — ED ADULT NURSE REASSESSMENT NOTE - GENERAL PATIENT STATE
Eval attempted on 12S. Neurosurgery recommending TLSO for mobility per RNSilvana. Patient does not have a back brace yet. Additionally noting R LE TTWB per ortho at this time. Will HOLD PT evaluation until TLSO is delivered.   comfortable appearance

## 2024-03-26 NOTE — ED ADULT NURSE NOTE - OBJECTIVE STATEMENT
78 yr old male c/o ostomy bleeding. Pt has been having recurrent bleeding from his ostomy. Pt states his hemoglobin is low. Hx of afib, multiple blood transfusions, Crohn's, and multiple abdominal surgeries. Pt denies chest pain, SOB, fevers, chills, N/V. Respirations spontaneous and unlabored. A&Ox4.

## 2024-03-26 NOTE — ED ADULT NURSE REASSESSMENT NOTE - NS ED NURSE REASSESS COMMENT FT1
RN spoke to blood bank. "Blood will be ready in 45 min we are doing the cross now". Pt and family updated. Pt resting comfortably in bed eating. Family at bedside.

## 2024-03-26 NOTE — ED PROVIDER NOTE - PROGRESS NOTE DETAILS
Daniel Andrade MD: Patient signed out to me by Dr. Wilson pending 2u transfusion and rpt cbc. Hgb level now 8.2. Spoke with PMD Dr. Aguiar, agrees with DC home and continued f/u care as established at home. Patient informed at bedside, comfortable, happy with plan. DC via ambulance.

## 2024-03-26 NOTE — ED PROVIDER NOTE - PHYSICAL EXAMINATION
CONSTITUTIONAL: Well-appearing; well-nourished; in no apparent distress.   HEAD: Normocephalic; atraumatic.   EYES:  conjunctiva and sclera clear  ENT: normal nose; no rhinorrhea;  NECK: Supple; full ROM  RESPIRATORY: Breathing easily; no resp difficulty  GI: soft, stoma w/ active bleeding, green stool mixed w/ clots  EXT: No cyanosis or edema;  SKIN: Normal for age and race; warm; dry; good turgor; no apparent lesions or rash.   NEURO: A & O x 3; face symmetric; grossly unremarkable.   PSYCHOLOGICAL: The patient’s mood and manner are appropriate.

## 2024-03-28 NOTE — H&P ADULT - NSICDXPASTSURGICALHX_GEN_ALL_CORE_FT
PAST SURGICAL HISTORY:  H/O knee surgery     History of cataract surgery      PAST SURGICAL HISTORY:  H/O knee surgery     History of cataract surgery     S/P appendectomy

## 2024-03-28 NOTE — PATIENT PROFILE ADULT - LIVING ENVIRONMENT
no The patient discussed with DEEJAY BRANTLEY I, Brody David, performed the initial face to face bedside interview with this patient regarding history of present illness, review of symptoms and relevant past medical, social and family history.  I completed an independent physical examination.  I was the initial provider who evaluated this patient. I have signed out the follow up of any pending tests (i.e. labs, radiological studies) to the ACP.  I have communicated the patient’s plan of care and disposition with the ACP.

## 2024-03-28 NOTE — H&P ADULT - PROBLEM SELECTOR PLAN 5
Home med: venlafaxine 150 mg BID  - Continue home medication. Synthroid 50 mcg qd  - Continue home medication.

## 2024-03-28 NOTE — H&P ADULT - NSHPLABSRESULTS_GEN_ALL_CORE
Lactate Trend            CAPILLARY BLOOD GLUCOSE              Culture Results:   No growth (03-11 @ 15:51)  Culture Results:   No growth at 5 days. (03-11 @ 13:15)  Culture Results:   No growth at 5 days. (03-11 @ 13:15)  Culture Results:   Insignificant amount of mixed growth. (02-28 @ 17:38)  Culture Results:   No growth at 5 days. (02-28 @ 15:20)  Culture Results:   No growth at 5 days. (02-28 @ 15:15)

## 2024-03-28 NOTE — PATIENT PROFILE ADULT - DO YOU LACK THE NECESSARY SUPPORT TO HELP YOU COPE WITH LIFE CHALLENGES?
Fever- since Tuesday 105 intermittent . Mom given tylenol 5 ml/motrin 5 ml every 6 hrs. Last dose 930 am tylenol   Right eye -  Puffy and redness.  Denies any discharge
no

## 2024-03-28 NOTE — H&P ADULT - PROBLEM SELECTOR PLAN 2
History of Short bowel syndrome. On prior admission, high output w/ EC fistula. On TPN at home, also on regular diet.  - hold home gattrex at this time  - Recommend continuing TPN in AM  - Q6H fingersticks   - Monitor EC fistula output.

## 2024-03-28 NOTE — H&P ADULT - PROBLEM SELECTOR PLAN 7
F: as above  E: replete as needed  N: regular/TPN  VTE Prophylaxis: SCDs   Activity: ambulate as tolerated  D: RMF

## 2024-03-28 NOTE — H&P ADULT - PROBLEM SELECTOR PLAN 6
F: as above  E: replete as needed  N: regular/TPN  VTE Prophylaxis: SCDs   Activity: ambulate as tolerated  D: RMF Home med: venlafaxine 150 mg 2 tabs QD.  - Continue home medication.

## 2024-03-28 NOTE — H&P ADULT - PROBLEM SELECTOR PLAN 4
Synthroid 50 mcg qd  - Continue home medication. Home med: crestor 5mg qd.   - c/w TI lipitor 20mg qhs

## 2024-03-28 NOTE — H&P ADULT - NSICDXPASTMEDICALHX_GEN_ALL_CORE_FT
PAST MEDICAL HISTORY:  Atrial fibrillation s/p cardioversion 2010 and 2014  Pt. reports 4 DCCV  Now on Amiodarone 200mg PO bid and Eliquis 5mg PO bid  Last DCCV 4 yrs ago at [facility]    Bradycardia     Crohn's disease s/p partial resection of ileum    Essential hypertension Hypertension    History of depression On Venlafaxine ER 150mg PO bid    Hyperlipidemia     Hypothyroidism     Junctional rhythm

## 2024-03-28 NOTE — H&P ADULT - HISTORY OF PRESENT ILLNESS
HPI: Patient is a 79yo M with PMH of AFib/Flutter s/p DCCV, Crohn's, prior ileocectomy and open appendectomy, extensive abdominal surgeries and ileostomy, short-bowel syndrome, hypothyroidism, depression, recently seen at the Emergency Department for blood transfusion and multiple recent admissions for transfusions who presents as a direct admission for recurrent anemia. Per Dr. Aguiar, patient has had recurrent bleeding from the ostomy site despite multiple interventions. Concern for hematologic disorder, and outpatient labs once again showing Hgb 7 (discharged from the ED two days ago with Hgb 8.2 after receiving 2u pRBCs).     On admission:  Initial vital signs: T: XX F, HR: XX, BP: XX, R: XX, SpO2: XX% on RA  Labs: significant for  EKG: pending  Consults: hematology HPI: Patient is a 79yo M with PMH of AFib/Flutter s/p DCCV, Crohn's, prior ileocectomy and open appendectomy, extensive abdominal surgeries and ileostomy, short-bowel syndrome, hypothyroidism, depression, recently seen at the Emergency Department for blood transfusion and multiple recent admissions for transfusions who presents as a direct admission for recurrent anemia. Per Dr. Aguiar, patient has had recurrent bleeding from the ostomy site despite multiple interventions. Concern for hematologic disorder, and outpatient labs once again showing Hgb 7 (discharged from the ED two days ago with Hgb 8.2 after receiving 2u pRBCs).     On admission:  Initial Vital Signs: T 97.8 F, HR 87, /77, RR 16, SpO2 94% on room air  Labs: significant for  EKG: pending  Consults: hematology HPI: Patient is a 79yo M with PMH of AFib/Flutter s/p DCCV, Crohn's, prior ileocectomy and open appendectomy, extensive abdominal surgeries and ileostomy, short-bowel syndrome, hypothyroidism, depression, recently seen at the Emergency Department for blood transfusion and multiple recent admissions for transfusions who presents as a direct admission for recurrent anemia. Per Dr. Aguiar, patient has had recurrent bleeding from the ostomy site despite multiple interventions. Concern for hematologic disorder, and outpatient labs once again showing Hgb 7 (discharged from the ED two days ago with Hgb 8.2 after receiving 2u pRBCs). Patient seen at bedside. Reports he had oozing once again from the ostomy site, possibly triggered by coughing. Additionally, reports 1 episode of brief palpitations, when he rechecked his pulse it was in the 70s and symptoms had resolved. Concerned he may have recurrence of AFib, reports he has been off the Amiodarone. Also believes Gattex may be contributing to the frequency of bleeding, has been holding this medication for now. Denies chest pain, SOB.     On admission:  Initial Vital Signs: T 97.8 F, HR 87, /77, RR 16, SpO2 94% on room air  Labs: significant for WBC 10.94, Hgb 7.6, Na 132, BUN/Cr 43/1.43, AST//168  EKG: pending  Consults: hematology

## 2024-03-28 NOTE — H&P ADULT - PROBLEM SELECTOR PLAN 3
Diagnosis of AFib/Flutter s/p DCCVs.   Home medication: amiodarone 200 mg QD, lopressor 50 mg BID PO  - Continue amiodarone   - hold  beta blocker given active bleed. Resume as appropriate. Diagnosis of AFib/Flutter s/p DCCVs.   Home medication: lopressor 50 mg QD PO  - hold beta blocker given active bleed. Resume as appropriate.

## 2024-03-28 NOTE — H&P ADULT - ASSESSMENT
Patient is a 77yo M with PMH of AFib/Flutter s/p DCCV, Crohn's, prior ileocectomy and open appendectomy, extensive abdominal surgeries and ileostomy, short-bowel syndrome, hypothyroidism, depression, recently seen at the Emergency Department for blood transfusion and multiple recent admissions for transfusions who presents as a direct admission for recurrent anemia.

## 2024-03-28 NOTE — H&P ADULT - NSHPPHYSICALEXAM_GEN_ALL_CORE
.  VITAL SIGNS:  T(C): 36.6 (03-28-24 @ 20:53), Max: 36.6 (03-28-24 @ 20:53)  T(F): 97.8 (03-28-24 @ 20:53), Max: 97.8 (03-28-24 @ 20:53)  HR: 87 (03-28-24 @ 20:53) (87 - 87)  BP: 126/77 (03-28-24 @ 20:53) (126/77 - 126/77)  BP(mean): --  RR: 16 (03-28-24 @ 20:53) (16 - 16)  SpO2: 94% (03-28-24 @ 20:53) (94% - 94%)  Wt(kg): --    PHYSICAL EXAM:    Constitutional: resting comfortably in bed; NAD  Head: NC/AT  Eyes: PERRL, EOMI, anicteric sclera  ENT: no nasal discharge; uvula midline, no oropharyngeal erythema or exudates; MMM  Neck: supple; no JVD or thyromegaly  Respiratory: CTA B/L; no W/R/R, no retractions  Cardiac: +S1/S2; RRR; no M/R/G; PMI non-displaced  Gastrointestinal: abdomen soft, NT/ND; no rebound or guarding; +BSx4  Back: spine midline, no bony tenderness or step-offs; no CVAT B/L  Extremities: WWP, no clubbing or cyanosis; no peripheral edema  Musculoskeletal: NROM x4; no joint swelling, tenderness or erythema  Vascular: 2+ radial, DP/PT pulses B/L  Dermatologic: skin warm, dry and intact; no rashes, wounds, or scars  Lymphatic: no submandibular or cervical LAD  Neurologic: AAOx3; CNII-XII grossly intact; no focal deficits  Psychiatric: affect and characteristics of appearance, verbalizations, behaviors are appropriate

## 2024-03-28 NOTE — H&P ADULT - PROBLEM SELECTOR PLAN 1
Patient with Hgb 7.0 on outpatient labs, repeat here ____. Last discharged with Hgb 8.2 from the ED two days ago after receiving 2u pRBC. Likely due to continued oozing from stoma site. Hematology consulted given ongoing anemia and the need for multiple blood transfusions.   - f/u iron studies, vit B12, folate, retic count  - epogen 150U/kG SQ x 1  - transfuse to Hgb > 7  - maintain active T&S  - maintain two large bore IVs  - plan for iron infusion in the AM  - hematology Dr. Villatoro consulted, f/u recs Patient with Hgb 7.7 on outpatient labs, repeat here 7.6. Last discharged with Hgb 8.2 from the ED two days ago after receiving 2u pRBC. Likely due to continued oozing from stoma site. Hematology consulted given ongoing anemia and the need for multiple blood transfusions.   - f/u iron studies, vit B12, folate, retic count  - epogen 150U/kG SQ x 1  - transfuse to Hgb > 7  - maintain active T&S  - maintain two large bore IVs  - plan for iron infusion in the AM  - hematology Dr. Villatoro consulted, f/u recs

## 2024-03-28 NOTE — H&P ADULT - NSHPSOCIALHISTORY_GEN_ALL_CORE
Drinks alcohol occasionally, former cigarette use (~16 pack years), no illicit drug use.   Lives at home, has a caregiver

## 2024-03-28 NOTE — PATIENT PROFILE ADULT - NSPROEXTENSIONSOFSELF_GEN_A_NUR
can walk by himself but uses cane or rollator/hearing aid/walker can walk by himself but uses cane or rollator/walker

## 2024-03-28 NOTE — PATIENT PROFILE ADULT - FALL HARM RISK - HARM RISK INTERVENTIONS
Assistance with ambulation/Assistance OOB with selected safe patient handling equipment/Communicate Risk of Fall with Harm to all staff/Discuss with provider need for PT consult/Monitor gait and stability/Provide patient with walking aids - walker, cane, crutches/Reinforce activity limits and safety measures with patient and family/Sit up slowly, dangle for a short time, stand at bedside before walking/Tailored Fall Risk Interventions/Visual Cue: Yellow wristband and red socks/Bed in lowest position, wheels locked, appropriate side rails in place/Call bell, personal items and telephone in reach/Instruct patient to call for assistance before getting out of bed or chair/Non-slip footwear when patient is out of bed/[place] to call system/Physically safe environment - no spills, clutter or unnecessary equipment/Purposeful Proactive Rounding/Room/bathroom lighting operational, light cord in reach

## 2024-03-29 NOTE — CONSULT NOTE ADULT - SUBJECTIVE AND OBJECTIVE BOX
77 yo man with a history of AFib/Flutter s/p DCCV, Crohn's, prior ileocectomy and open appendectomy, extensive abdominal surgeries and ileostomy presents with persistent anemia     He has had blood transfusions in the past however anemia is ongoing. History of blood loss in the ostomy. Currently with blood in the ostomy after he had an episode of coughing.     ICU Vital Signs Last 24 Hrs  T(C): 36.7 (29 Mar 2024 09:06), Max: 37 (29 Mar 2024 00:08)  T(F): 98 (29 Mar 2024 09:06), Max: 98.6 (29 Mar 2024 00:08)  HR: 88 (29 Mar 2024 09:06) (84 - 90)  BP: 120/84 (29 Mar 2024 09:06) (114/60 - 126/77)  BP(mean): 86 (29 Mar 2024 00:08) (86 - 86)  ABP: --  ABP(mean): --  RR: 17 (29 Mar 2024 09:06) (16 - 17)  SpO2: 94% (29 Mar 2024 09:06) (94% - 97%)    O2 Parameters below as of 29 Mar 2024 09:06  Patient On (Oxygen Delivery Method): room air        Home Medications:  allopurinol 300 mg oral tablet: 1 tab(s) orally once a day (28 Feb 2024 22:11)  Ativan 0.5 mg oral tablet: 1 tab(s) orally once a day (at bedtime) as needed for  insomnia (28 Mar 2024 23:08)  ChloraPrep One-Step 2% topical pad: Apply topically to affected area once a day 1 Apply topically to affected area (13 Mar 2024 14:29)  cholestyramine 4 g/8.3 g oral powder for reconstitution: 4 gram(s) orally once a day (28 Mar 2024 23:05)  Lopressor 50 mg oral tablet: 1 tab(s) orally once a day (28 Mar 2024 23:06)  rosuvastatin 5 mg oral tablet: 1 tab(s) orally once a day (28 Mar 2024 23:07)  teduglutide 5 mg subcutaneous kit: 0.05 mg/kg subcutaneously once a day (28 Feb 2024 22:38)  venlafaxine 150 mg oral tablet, extended release: 2 tab(s) orally once a day (28 Mar 2024 23:01)    Physical Exam:    AAOx3  RRR  CTAB  Ileostomy present, stoma is pink and appears healthy. Blood in ostomy bag                           7.6    9.41  )-----------( 178      ( 29 Mar 2024 05:30 )             24.4     03-29    132<L>  |  100  |  40<H>  ----------------------------<  88  4.1   |  28  |  1.48<H>    Ca    8.2<L>      29 Mar 2024 05:30  Phos  4.7     03-29  Mg     2.3     03-29    TPro  7.5  /  Alb  2.0<L>  /  TBili  5.2<H>  /  DBili  x   /  AST  See Note  /  ALT  154<H>  /  AlkPhos  100  03-29

## 2024-03-29 NOTE — PROGRESS NOTE ADULT - PROBLEM SELECTOR PLAN 2
History of Short bowel syndrome. On prior admission, high output w/ EC fistula. On TPN at home, also on regular diet.  - hold home gattrex at this time  - Recommend continuing TPN in AM  - Q6H fingersticks   - Monitor EC fistula output. History of Short bowel syndrome. On prior admission, high output w/ EC fistula. On TPN at home, also on regular diet.  Continued TPN  - hold home gattrex at this time  - Q6H fingersticks   - Monitor EC fistula output.

## 2024-03-29 NOTE — PROGRESS NOTE ADULT - PROBLEM SELECTOR PLAN 1
Patient with Hgb 7.7 on outpatient labs, repeat here 7.6. Last discharged with Hgb 8.2 from the ED two days ago after receiving 2u pRBC. Likely due to continued oozing from stoma site. Hematology consulted given ongoing anemia and the need for multiple blood transfusions.   - f/u iron studies, vit B12, folate, retic count  - epogen 150U/kG SQ x 1  - transfuse to Hgb > 7  - maintain active T&S  - maintain two large bore IVs  - plan for iron infusion in the AM  - hematology Dr. Villatoro consulted, f/u recs Patient with Hgb 7.7 on outpatient labs, repeat here 7.6. Last discharged with Hgb 8.2 from the ED two days ago after receiving 2u pRBC. Likely due to continued oozing from stoma site. Hematology consulted given ongoing anemia and the need for multiple blood transfusions.   Total iron 35, TIBC wnl,   S/p epogen 150U/kG SQ x 1  S/p ~2/3 unit RBC, transfusing second unit  - transfuse to Hgb > 7  - maintain active T&S  - maintain two large bore IVs  - hematology Dr. Villatoro consulted, f/u recs  - f/u 6pm CBC Patient with Hgb 7.7 on outpatient labs, repeat here 7.6. Last discharged with Hgb 8.2 from the ED two days ago after receiving 2u pRBC. Likely due to continued oozing from stoma site. Hematology consulted given ongoing anemia and the need for multiple blood transfusions.   Total iron 35, TIBC wnl. S/p epogen 150U/kG SQ x 1. S/p ~2/3 unit RBC, transfusing second unit  Heme-onc consulted  - F/u heme onc recs  - transfuse to Hgb > 7  - maintain active T&S  - maintain two large bore IVs  - f/u 6pm CBC

## 2024-03-29 NOTE — PROGRESS NOTE ADULT - ASSESSMENT
Patient is a 79yo M with PMH of AFib/Flutter s/p DCCV, Crohn's, prior ileocectomy and open appendectomy, extensive abdominal surgeries and ileostomy, short-bowel syndrome, hypothyroidism, depression, recently seen at the Emergency Department for blood transfusion and multiple recent admissions for transfusions who presents as a direct admission for recurrent anemia.

## 2024-03-29 NOTE — PROGRESS NOTE ADULT - SUBJECTIVE AND OBJECTIVE BOX
OVERNIGHT EVENTS:    SUBJECTIVE / INTERVAL HPI: Patient seen and examined at bedside.     VITAL SIGNS:  Vital Signs Last 24 Hrs  T(C): 36.5 (29 Mar 2024 04:45), Max: 37 (29 Mar 2024 00:08)  T(F): 97.7 (29 Mar 2024 04:45), Max: 98.6 (29 Mar 2024 00:08)  HR: 90 (29 Mar 2024 04:45) (84 - 90)  BP: 114/60 (29 Mar 2024 04:45) (114/60 - 126/77)  BP(mean): 86 (29 Mar 2024 00:08) (86 - 86)  RR: 17 (29 Mar 2024 04:45) (16 - 17)  SpO2: 96% (29 Mar 2024 04:45) (94% - 97%)    Parameters below as of 29 Mar 2024 04:45  Patient On (Oxygen Delivery Method): room air        PHYSICAL EXAM:    General: NAD  HEENT: NC/AT; PERRL, anicteric sclera; MMM  Neck: supple w/o palpable nodularity  Cardiovascular: +S1/S2; RRR  Respiratory: CTA B/L; no W/R/R  Gastrointestinal: soft, NT/ND; +BSx4  Extremities: WWP; no edema, clubbing or cyanosis  Vascular: 2+ radial, DP/PT pulses B/L  Neurological: AAOx3; no focal deficits    MEDICATIONS:  MEDICATIONS  (STANDING):  atorvastatin 20 milliGRAM(s) Oral at bedtime  cholestyramine Powder (Sugar-Free) 4 Gram(s) Oral every 24 hours  dextrose 5%. 1000 milliLiter(s) (100 mL/Hr) IV Continuous <Continuous>  dextrose 5%. 1000 milliLiter(s) (50 mL/Hr) IV Continuous <Continuous>  dextrose 50% Injectable 25 Gram(s) IV Push once  dextrose 50% Injectable 12.5 Gram(s) IV Push once  dextrose 50% Injectable 25 Gram(s) IV Push once  glucagon  Injectable 1 milliGRAM(s) IntraMuscular once  iron sucrose IVPB 300 milliGRAM(s) IV Intermittent every 24 hours  levothyroxine 50 MICROGram(s) Oral every 24 hours  pancrelipase  (CREON 24,000 Lipase Units) 1 Capsule(s) Oral three times a day with meals  venlafaxine XR. 300 milliGRAM(s) Oral every 24 hours    MEDICATIONS  (PRN):  acetaminophen     Tablet .. 650 milliGRAM(s) Oral every 6 hours PRN Temp greater or equal to 38C (100.4F), Mild Pain (1 - 3)  dextrose Oral Gel 15 Gram(s) Oral once PRN Blood Glucose LESS THAN 70 milliGRAM(s)/deciliter  guaiFENesin Oral Liquid (Sugar-Free) 100 milliGRAM(s) Oral every 6 hours PRN Cough  LORazepam     Tablet 0.5 milliGRAM(s) Oral at bedtime PRN Insomnia      ALLERGIES:  Allergies    penicillin (Angioedema)    Intolerances        LABS:                        7.6    10.94 )-----------( 183      ( 28 Mar 2024 22:15 )             24.1     03-28    132<L>  |  99  |  43<H>  ----------------------------<  73  3.5   |  26  |  1.43<H>    Ca    8.6      28 Mar 2024 22:15  Phos  3.9     03-28  Mg     2.3     03-28    TPro  8.2  /  Alb  2.2<L>  /  TBili  5.3<H>  /  DBili  x   /  AST  347<H>  /  ALT  168<H>  /  AlkPhos  115  03-28      Urinalysis Basic - ( 28 Mar 2024 22:15 )    Color: x / Appearance: x / SG: x / pH: x  Gluc: 73 mg/dL / Ketone: x  / Bili: x / Urobili: x   Blood: x / Protein: x / Nitrite: x   Leuk Esterase: x / RBC: x / WBC x   Sq Epi: x / Non Sq Epi: x / Bacteria: x      CAPILLARY BLOOD GLUCOSE      POCT Blood Glucose.: 91 mg/dL (29 Mar 2024 05:38)      RADIOLOGY & ADDITIONAL TESTS: Reviewed.   OVERNIGHT EVENTS: AMRITA    SUBJECTIVE / INTERVAL HPI: Patient seen and examined at bedside. Patient found laying in bed, NAD. Patient says he feels well but doesn't understand why he keeps bleeding. ROS negative.     VITAL SIGNS:  Vital Signs Last 24 Hrs  T(C): 36.5 (29 Mar 2024 04:45), Max: 37 (29 Mar 2024 00:08)  T(F): 97.7 (29 Mar 2024 04:45), Max: 98.6 (29 Mar 2024 00:08)  HR: 90 (29 Mar 2024 04:45) (84 - 90)  BP: 114/60 (29 Mar 2024 04:45) (114/60 - 126/77)  BP(mean): 86 (29 Mar 2024 00:08) (86 - 86)  RR: 17 (29 Mar 2024 04:45) (16 - 17)  SpO2: 96% (29 Mar 2024 04:45) (94% - 97%)    Parameters below as of 29 Mar 2024 04:45  Patient On (Oxygen Delivery Method): room air        PHYSICAL EXAM:    General: NAD, resting comfortably in bed   HEENT: NC/AT; PERRL, scleral icterus appreciapted; MMM  Neck: supple w/o palpable nodularity  Cardiovascular: +S1/S2; RRR  Respiratory: CTA B/L; no W/R/R  Gastrointestinal: Colostomy bag with small amount of fruit punch color fluid   Extremities: WWP; no edema, clubbing or cyanosis  Vascular: 2+ radial, DP/PT pulses B/L  Neurological: AAOx3; no focal deficits    MEDICATIONS:  MEDICATIONS  (STANDING):  atorvastatin 20 milliGRAM(s) Oral at bedtime  cholestyramine Powder (Sugar-Free) 4 Gram(s) Oral every 24 hours  dextrose 5%. 1000 milliLiter(s) (100 mL/Hr) IV Continuous <Continuous>  dextrose 5%. 1000 milliLiter(s) (50 mL/Hr) IV Continuous <Continuous>  dextrose 50% Injectable 25 Gram(s) IV Push once  dextrose 50% Injectable 12.5 Gram(s) IV Push once  dextrose 50% Injectable 25 Gram(s) IV Push once  glucagon  Injectable 1 milliGRAM(s) IntraMuscular once  iron sucrose IVPB 300 milliGRAM(s) IV Intermittent every 24 hours  levothyroxine 50 MICROGram(s) Oral every 24 hours  pancrelipase  (CREON 24,000 Lipase Units) 1 Capsule(s) Oral three times a day with meals  venlafaxine XR. 300 milliGRAM(s) Oral every 24 hours    MEDICATIONS  (PRN):  acetaminophen     Tablet .. 650 milliGRAM(s) Oral every 6 hours PRN Temp greater or equal to 38C (100.4F), Mild Pain (1 - 3)  dextrose Oral Gel 15 Gram(s) Oral once PRN Blood Glucose LESS THAN 70 milliGRAM(s)/deciliter  guaiFENesin Oral Liquid (Sugar-Free) 100 milliGRAM(s) Oral every 6 hours PRN Cough  LORazepam     Tablet 0.5 milliGRAM(s) Oral at bedtime PRN Insomnia      ALLERGIES:  Allergies    penicillin (Angioedema)    Intolerances        LABS:                        7.6    10.94 )-----------( 183      ( 28 Mar 2024 22:15 )             24.1     03-28    132<L>  |  99  |  43<H>  ----------------------------<  73  3.5   |  26  |  1.43<H>    Ca    8.6      28 Mar 2024 22:15  Phos  3.9     03-28  Mg     2.3     03-28    TPro  8.2  /  Alb  2.2<L>  /  TBili  5.3<H>  /  DBili  x   /  AST  347<H>  /  ALT  168<H>  /  AlkPhos  115  03-28      Urinalysis Basic - ( 28 Mar 2024 22:15 )    Color: x / Appearance: x / SG: x / pH: x  Gluc: 73 mg/dL / Ketone: x  / Bili: x / Urobili: x   Blood: x / Protein: x / Nitrite: x   Leuk Esterase: x / RBC: x / WBC x   Sq Epi: x / Non Sq Epi: x / Bacteria: x      CAPILLARY BLOOD GLUCOSE      POCT Blood Glucose.: 91 mg/dL (29 Mar 2024 05:38)      RADIOLOGY & ADDITIONAL TESTS: Reviewed.   OVERNIGHT EVENTS: AMRITA    SUBJECTIVE / INTERVAL HPI: Patient seen and examined at bedside. Patient found laying in bed, NAD. Patient says he feels well but doesn't understand why he keeps bleeding. ROS negative.     VITAL SIGNS:  Vital Signs Last 24 Hrs  T(C): 36.5 (29 Mar 2024 04:45), Max: 37 (29 Mar 2024 00:08)  T(F): 97.7 (29 Mar 2024 04:45), Max: 98.6 (29 Mar 2024 00:08)  HR: 90 (29 Mar 2024 04:45) (84 - 90)  BP: 114/60 (29 Mar 2024 04:45) (114/60 - 126/77)  BP(mean): 86 (29 Mar 2024 00:08) (86 - 86)  RR: 17 (29 Mar 2024 04:45) (16 - 17)  SpO2: 96% (29 Mar 2024 04:45) (94% - 97%)    Parameters below as of 29 Mar 2024 04:45  Patient On (Oxygen Delivery Method): room air        PHYSICAL EXAM:    General: NAD, resting comfortably in bed   HEENT: NC/AT; PERRL, scleral icterus appreciated MMM  Neck: supple w/o palpable nodularity  Cardiovascular: +S1/S2; RRR  Respiratory: CTA B/L; no W/R/R  Gastrointestinal: Colostomy bag with small amount of fruit punch color fluid   Extremities: WWP; no edema, clubbing or cyanosis  Vascular: 2+ radial, DP/PT pulses B/L  Neurological: AAOx3; no focal deficits    MEDICATIONS:  MEDICATIONS  (STANDING):  atorvastatin 20 milliGRAM(s) Oral at bedtime  cholestyramine Powder (Sugar-Free) 4 Gram(s) Oral every 24 hours  dextrose 5%. 1000 milliLiter(s) (100 mL/Hr) IV Continuous <Continuous>  dextrose 5%. 1000 milliLiter(s) (50 mL/Hr) IV Continuous <Continuous>  dextrose 50% Injectable 25 Gram(s) IV Push once  dextrose 50% Injectable 12.5 Gram(s) IV Push once  dextrose 50% Injectable 25 Gram(s) IV Push once  glucagon  Injectable 1 milliGRAM(s) IntraMuscular once  iron sucrose IVPB 300 milliGRAM(s) IV Intermittent every 24 hours  levothyroxine 50 MICROGram(s) Oral every 24 hours  pancrelipase  (CREON 24,000 Lipase Units) 1 Capsule(s) Oral three times a day with meals  venlafaxine XR. 300 milliGRAM(s) Oral every 24 hours    MEDICATIONS  (PRN):  acetaminophen     Tablet .. 650 milliGRAM(s) Oral every 6 hours PRN Temp greater or equal to 38C (100.4F), Mild Pain (1 - 3)  dextrose Oral Gel 15 Gram(s) Oral once PRN Blood Glucose LESS THAN 70 milliGRAM(s)/deciliter  guaiFENesin Oral Liquid (Sugar-Free) 100 milliGRAM(s) Oral every 6 hours PRN Cough  LORazepam     Tablet 0.5 milliGRAM(s) Oral at bedtime PRN Insomnia      ALLERGIES:  Allergies    penicillin (Angioedema)    Intolerances        LABS:                        7.6    10.94 )-----------( 183      ( 28 Mar 2024 22:15 )             24.1     03-28    132<L>  |  99  |  43<H>  ----------------------------<  73  3.5   |  26  |  1.43<H>    Ca    8.6      28 Mar 2024 22:15  Phos  3.9     03-28  Mg     2.3     03-28    TPro  8.2  /  Alb  2.2<L>  /  TBili  5.3<H>  /  DBili  x   /  AST  347<H>  /  ALT  168<H>  /  AlkPhos  115  03-28      Urinalysis Basic - ( 28 Mar 2024 22:15 )    Color: x / Appearance: x / SG: x / pH: x  Gluc: 73 mg/dL / Ketone: x  / Bili: x / Urobili: x   Blood: x / Protein: x / Nitrite: x   Leuk Esterase: x / RBC: x / WBC x   Sq Epi: x / Non Sq Epi: x / Bacteria: x      CAPILLARY BLOOD GLUCOSE      POCT Blood Glucose.: 91 mg/dL (29 Mar 2024 05:38)      RADIOLOGY & ADDITIONAL TESTS: Reviewed.

## 2024-03-29 NOTE — PROGRESS NOTE ADULT - SUBJECTIVE AND OBJECTIVE BOX
Comfortable this am Received 1 unit overnight  VS stable Afeb VR moderate Good BS ant Abd is soft No edema

## 2024-03-29 NOTE — CONSULT NOTE ADULT - ASSESSMENT
Patient is a 77yo M with PMH of AFib/Flutter s/p DCCV, Crohn's, prior ileocectomy and open appendectomy, extensive abdominal surgeries and ileostomy, short-bowel syndrome, hypothyroidism, depression, recently seen at the Emergency Department for blood transfusion and multiple recent admissions for transfusions who presents as a direct admission for recurrent anemia.  Patient is a 77yo M with PMH of AFib/Flutter s/p DCCV, Crohn's, prior ileocectomy and open appendectomy, extensive abdominal surgeries and ileostomy, short-bowel syndrome, hypothyroidism, depression, recently seen at the Emergency Department for blood transfusion and multiple recent admissions for transfusions who presents as a direct admission for recurrent anemia.     Labs reviewed. No concern for MDS based on diff.   Low iron sat, normal ferritin  This is consistent with blood loss iron deficiency and anemia and recent transfusions  Agree with iron transfusions   He is receiving procrit. This will only be helpful if his iron levels are fully repleated  Ok to transfuse for HGB < 7     Check labs for hemolysis: d-dimer, fibrinogen, JIM   Check labs for plasma cell dyscrasia: SPEP, UPEP, serum free light chains, serum immunoglobulins        Maria T Walker M.D. for Dr Jonathan Villatoro

## 2024-03-29 NOTE — PROGRESS NOTE ADULT - PROBLEM SELECTOR PLAN 3
Diagnosis of AFib/Flutter s/p DCCVs.   Home medication: lopressor 50 mg QD PO  - hold beta blocker given active bleed. Resume as appropriate.

## 2024-03-30 NOTE — PROGRESS NOTE ADULT - PROBLEM SELECTOR PLAN 6
Home med: venlafaxine 150 mg 2 tabs QD.  - Continue home medication. Synthroid 50 mcg qd  - Continue home medication.

## 2024-03-30 NOTE — PROGRESS NOTE ADULT - PROBLEM SELECTOR PLAN 2
Mild increase in WBC 12.18 on 3/30 , patient has been a febrile as up to 3/30. Reports cough w/ white sputum production since Wednesday prior to admission. Denies fever, chills, nausea, vomiting, changes in ostomy output nor dysuria.   Concern for PNA concern for HAP. Low concern for aspiration.   - Monitor CBC w differential  - Obtain RVP, MRSA swab, Strep and legionella, Procal, Sputum Cx, BCx x2, UA w reflex, Sputum culture  - Start CTX 1 gram q24hrs Mild increase in WBC 12.18 on 3/30 , patient has been a febrile as up to 3/30. Reports cough w/ white sputum production since Wednesday prior to admission. Denies fever, chills, nausea, vomiting, changes in ostomy output nor dysuria.   Concern for PNA concern for HAP. Low concern for aspiration.     Plan:   - F/u RVP, MRSA swab, Strep and legionella, Procal, Sputum Cx, BCx x2, UA w reflex, Sputum culture  - Start CTX 1 gram q24hrs Mild increase in WBC 12.18 on 3/30 , patient has been a febrile as up to 3/30. Reports cough w/ white sputum production since Wednesday prior to admission. Denies fever, chills, nausea, vomiting, changes in ostomy output nor dysuria.   Concern for PNA concern for HAP. Low concern for aspiration.     Plan:   - F/u RVP, MRSA swab, Strep and legionella, Procal, Sputum Cx, BCx x2, UA w reflex, Sputum culture  - Start CTX 1 gram q24hrs (3/31 - )  - C/w Vancomycin 1250 mg IV (3/31 - )

## 2024-03-30 NOTE — PROGRESS NOTE ADULT - PROBLEM SELECTOR PLAN 2
History of Short bowel syndrome. On prior admission, high output w/ EC fistula. On TPN at home, also on regular diet.  Continued TPN  - hold home gattrex at this time  - Q6H fingersticks   - Monitor EC fistula output. 3/30 pt complaining of feeling congested, requesting lasix. CXR with congestion and/or infiltrates. WBC uptrending 9->11->12. O2 requirements worsening but now satting 94% on 4L NC. Per private nurse pt developed a productive cough as of 3/27 and his oxygen demands are new as of today. Meeting 1/4 SIRS criteria for WBC (off b-blocker for bleed). Denies fevers/chills.   - Start coverage for HAP with Vanc 1250mg q12h and Cefepime 2g x3 doses(iso penicillin allergy)  - Will need ID approval for further doses  - Vanc trough before 4th dose  - F/u procal  - F/u sputum cultures 3/30 pt complaining of feeling congested, requesting lasix. CXR with congestion and/or infiltrates. WBC uptrending 9->11->12. O2 requirements worsening but now satting 94% on 4L NC. Per private nurse pt developed a productive cough as of 3/27 and his oxygen demands are new as of today. Meeting 1/4 SIRS criteria for WBC (off b-blocker for bleed). Denies fevers/chills.   S/p 20mg Lasix IVP   - Start coverage for HAP with Vanc 1250mg q12h and Cefepime 2g x3 doses(iso penicillin allergy)  - Will need ID approval for further doses  - Vanc trough before 4th dose  - F/u procal  - F/u sputum cultures

## 2024-03-30 NOTE — PROGRESS NOTE ADULT - SUBJECTIVE AND OBJECTIVE BOX
**** Acceptance [facility] to [facility] ****  Hospital course: 77yo M with PMH of AFib/Flutter s/p DCCV, Crohn's, prior ileocectomy and open appendectomy, extensive abdominal surgeries and ileostomy, short-bowel syndrome, hypothyroidism, depression, recently seen at the Emergency Department for blood transfusion and multiple recent admissions for transfusions px as a direct admission for recurrent anemia. Heme-onc consulted. Surgery consulted. S/p 3u pRBC. C/c/b increasing oxygen requirements and shortness of breath, improved with IV lasix. CXR with congestion and/or infiltrates. ICU consult placed for transfer to telemetry.     OVERNIGHT EVENTS/INTERVAL HPI:    REVIEW OF SYSTEMS:  All other review of systems is negative unless indicated above.    OBJECTIVE:  T(C): 36.8 (03-30-24 @ 20:09), Max: 36.8 (03-30-24 @ 20:09)  HR: 79 (03-30-24 @ 17:04) (68 - 95)  BP: 139/70 (03-30-24 @ 17:04) (121/73 - 159/74)  RR: 17 (03-30-24 @ 17:04) (16 - 17)  SpO2: 94% (03-30-24 @ 17:04) (94% - 99%)  Daily     Daily     Physical Exam:  General: in no acute distress, resting comfortably   Eyes: EOMI intact bilaterally. Anicteric sclerae, moist conjunctivae  HENT: Moist mucous membranes  Neck: Trachea midline, supple  Lungs: +crackles b/l, without wheezing   Cardiovascular: RRR. No murmurs, rubs, or gallops  Abdomen: +colostomy bag with blood, Soft, non-tender non-distended; No rebound or guarding  Extremities: WWP, No clubbing, cyanosis or edema  MSK: No midline bony tenderness. No CVA tenderness bilaterally  Neurological: Alert and oriented x3  Skin: Warm and dry. No obvious rash     Medications:  MEDICATIONS  (STANDING):  albuterol/ipratropium for Nebulization 3 milliLiter(s) Nebulizer every 6 hours  atorvastatin 20 milliGRAM(s) Oral at bedtime  cefTRIAXone   IVPB 1000 milliGRAM(s) IV Intermittent every 24 hours  cholestyramine Powder (Sugar-Free) 4 Gram(s) Oral every 24 hours  dextrose 5%. 1000 milliLiter(s) (100 mL/Hr) IV Continuous <Continuous>  dextrose 5%. 1000 milliLiter(s) (50 mL/Hr) IV Continuous <Continuous>  dextrose 50% Injectable 25 Gram(s) IV Push once  dextrose 50% Injectable 12.5 Gram(s) IV Push once  dextrose 50% Injectable 25 Gram(s) IV Push once  glucagon  Injectable 1 milliGRAM(s) IntraMuscular once  iron sucrose IVPB 300 milliGRAM(s) IV Intermittent every 24 hours  levothyroxine 50 MICROGram(s) Oral every 24 hours  lipid, fat emulsion (Fish Oil and Plant Based) 20% Infusion 0.5263 Gm/kG/Day (20.83 mL/Hr) IV Continuous <Continuous>  pancrelipase  (CREON 24,000 Lipase Units) 1 Capsule(s) Oral three times a day with meals  Parenteral Nutrition - Adult 1 Each TPN Continuous <Continuous>  Parenteral Nutrition - Adult 1 Each TPN Continuous <Continuous>  vancomycin  IVPB 1250 milliGRAM(s) IV Intermittent every 12 hours  venlafaxine XR. 300 milliGRAM(s) Oral every 24 hours    MEDICATIONS  (PRN):  acetaminophen     Tablet .. 650 milliGRAM(s) Oral every 6 hours PRN Temp greater or equal to 38C (100.4F), Mild Pain (1 - 3)  dextrose Oral Gel 15 Gram(s) Oral once PRN Blood Glucose LESS THAN 70 milliGRAM(s)/deciliter  guaiFENesin Oral Liquid (Sugar-Free) 100 milliGRAM(s) Oral every 6 hours PRN Cough  LORazepam     Tablet 0.5 milliGRAM(s) Oral at bedtime PRN Insomnia  trimethobenzamide Injectable 200 milliGRAM(s) IntraMuscular every 6 hours PRN Nausea and/or Vomiting      Labs:                        9.7    12.18 )-----------( 201      ( 30 Mar 2024 07:42 )             31.1     03-30    133<L>  |  100  |  32<H>  ----------------------------<  131<H>  3.7   |  31  |  1.30    Ca    8.6      30 Mar 2024 07:42  Phos  3.2     03-30  Mg     2.0     03-30    TPro  8.4<H>  /  Alb  2.2<L>  /  TBili  5.9<H>  /  DBili  x   /  AST  311<H>  /  ALT  170<H>  /  AlkPhos  118  03-30    PT/INR - ( 29 Mar 2024 05:30 )   PT: 13.7 sec;   INR: 1.21          PTT - ( 29 Mar 2024 05:30 )  PTT:29.4 sec  Urinalysis Basic - ( 30 Mar 2024 07:42 )    Color: x / Appearance: x / SG: x / pH: x  Gluc: 131 mg/dL / Ketone: x  / Bili: x / Urobili: x   Blood: x / Protein: x / Nitrite: x   Leuk Esterase: x / RBC: x / WBC x   Sq Epi: x / Non Sq Epi: x / Bacteria: x      SARS-CoV-2: NotDetec (15 Sukhdev 2024 11:15)  SARS-CoV-2: NotDetec (24 Nov 2023 00:09)  SARS-CoV-2: NotDetec (15 Nov 2023 12:11)  COVID-19 PCR: NotDetec (14 Nov 2023 20:59)  SARS-CoV-2: NotDetec (31 Oct 2023 09:16)      Radiology: Reviewed **** Acceptance [facility] to [facility] ****  Hospital course: 77yo M with PMH of AFib/Flutter s/p DCCV, Crohn's, prior ileocectomy and open appendectomy, extensive abdominal surgeries and ileostomy, short-bowel syndrome, hypothyroidism, depression, recently seen at the Emergency Department for blood transfusion and multiple recent admissions for transfusions px as a direct admission for recurrent anemia. Heme-onc consulted. Surgery consulted. S/p 3u pRBC. C/c/b increasing oxygen requirements and shortness of breath, improved with IV lasix. CXR with congestion and/or infiltrates. ICU consult placed for transfer to telemetry.     OVERNIGHT EVENTS/INTERVAL HPI: Patient seen and examined at bedside. Resting comfortably in bed. Complaining of anxiety and iWOB. Saturating 98% on 2L NC.     REVIEW OF SYSTEMS:  All other review of systems is negative unless indicated above.    OBJECTIVE:  T(C): 36.8 (03-30-24 @ 20:09), Max: 36.8 (03-30-24 @ 20:09)  HR: 79 (03-30-24 @ 17:04) (68 - 95)  BP: 139/70 (03-30-24 @ 17:04) (121/73 - 159/74)  RR: 17 (03-30-24 @ 17:04) (16 - 17)  SpO2: 94% (03-30-24 @ 17:04) (94% - 99%)  Daily     Daily     Physical Exam:  General: in no acute distress, resting comfortably   Eyes: EOMI intact bilaterally. Anicteric sclerae, moist conjunctivae  HENT: Moist mucous membranes  Neck: Trachea midline, supple  Lungs: +crackles b/l, without wheezing   Cardiovascular: RRR. No murmurs, rubs, or gallops  Abdomen: +colostomy bag with blood, Soft, non-tender non-distended; No rebound or guarding  Extremities: WWP, No clubbing, cyanosis or edema  MSK: No midline bony tenderness. No CVA tenderness bilaterally  Neurological: Alert and oriented x3  Skin: Warm and dry. No obvious rash     Medications:  MEDICATIONS  (STANDING):  albuterol/ipratropium for Nebulization 3 milliLiter(s) Nebulizer every 6 hours  atorvastatin 20 milliGRAM(s) Oral at bedtime  cefTRIAXone   IVPB 1000 milliGRAM(s) IV Intermittent every 24 hours  cholestyramine Powder (Sugar-Free) 4 Gram(s) Oral every 24 hours  dextrose 5%. 1000 milliLiter(s) (100 mL/Hr) IV Continuous <Continuous>  dextrose 5%. 1000 milliLiter(s) (50 mL/Hr) IV Continuous <Continuous>  dextrose 50% Injectable 25 Gram(s) IV Push once  dextrose 50% Injectable 12.5 Gram(s) IV Push once  dextrose 50% Injectable 25 Gram(s) IV Push once  glucagon  Injectable 1 milliGRAM(s) IntraMuscular once  iron sucrose IVPB 300 milliGRAM(s) IV Intermittent every 24 hours  levothyroxine 50 MICROGram(s) Oral every 24 hours  lipid, fat emulsion (Fish Oil and Plant Based) 20% Infusion 0.5263 Gm/kG/Day (20.83 mL/Hr) IV Continuous <Continuous>  pancrelipase  (CREON 24,000 Lipase Units) 1 Capsule(s) Oral three times a day with meals  Parenteral Nutrition - Adult 1 Each TPN Continuous <Continuous>  Parenteral Nutrition - Adult 1 Each TPN Continuous <Continuous>  vancomycin  IVPB 1250 milliGRAM(s) IV Intermittent every 12 hours  venlafaxine XR. 300 milliGRAM(s) Oral every 24 hours    MEDICATIONS  (PRN):  acetaminophen     Tablet .. 650 milliGRAM(s) Oral every 6 hours PRN Temp greater or equal to 38C (100.4F), Mild Pain (1 - 3)  dextrose Oral Gel 15 Gram(s) Oral once PRN Blood Glucose LESS THAN 70 milliGRAM(s)/deciliter  guaiFENesin Oral Liquid (Sugar-Free) 100 milliGRAM(s) Oral every 6 hours PRN Cough  LORazepam     Tablet 0.5 milliGRAM(s) Oral at bedtime PRN Insomnia  trimethobenzamide Injectable 200 milliGRAM(s) IntraMuscular every 6 hours PRN Nausea and/or Vomiting      Labs:                        9.7    12.18 )-----------( 201      ( 30 Mar 2024 07:42 )             31.1     03-30    133<L>  |  100  |  32<H>  ----------------------------<  131<H>  3.7   |  31  |  1.30    Ca    8.6      30 Mar 2024 07:42  Phos  3.2     03-30  Mg     2.0     03-30    TPro  8.4<H>  /  Alb  2.2<L>  /  TBili  5.9<H>  /  DBili  x   /  AST  311<H>  /  ALT  170<H>  /  AlkPhos  118  03-30    PT/INR - ( 29 Mar 2024 05:30 )   PT: 13.7 sec;   INR: 1.21          PTT - ( 29 Mar 2024 05:30 )  PTT:29.4 sec  Urinalysis Basic - ( 30 Mar 2024 07:42 )    Color: x / Appearance: x / SG: x / pH: x  Gluc: 131 mg/dL / Ketone: x  / Bili: x / Urobili: x   Blood: x / Protein: x / Nitrite: x   Leuk Esterase: x / RBC: x / WBC x   Sq Epi: x / Non Sq Epi: x / Bacteria: x      SARS-CoV-2: NotDetec (15 Sukhdev 2024 11:15)  SARS-CoV-2: NotDetec (24 Nov 2023 00:09)  SARS-CoV-2: NotDetec (15 Nov 2023 12:11)  COVID-19 PCR: NotDetec (14 Nov 2023 20:59)  SARS-CoV-2: NotDetec (31 Oct 2023 09:16)      Radiology: Reviewed **** Acceptance [facility] to [facility] ****  Hospital course: 79yo M with PMH of AFib/Flutter s/p DCCV, Crohn's, prior ileocectomy and open appendectomy, extensive abdominal surgeries and ileostomy, short-bowel syndrome, hypothyroidism, depression, recently seen at the Emergency Department for blood transfusion and multiple recent admissions for transfusions px as a direct admission for recurrent anemia. Heme-onc consulted. Surgery consulted. S/p 3u pRBC. C/c/b increasing oxygen requirements and shortness of breath, improved with IV lasix. CXR with congestion and/or infiltrates. ICU consult placed for transfer to telemetry.     OVERNIGHT EVENTS/INTERVAL HPI: Patient seen and examined at bedside. Resting comfortably in bed. Complaining of anxiety and iWOB. Saturating 98% on 2L NC.     REVIEW OF SYSTEMS:  All other review of systems is negative unless indicated above.    OBJECTIVE:  T(C): 36.8 (03-30-24 @ 20:09), Max: 36.8 (03-30-24 @ 20:09)  HR: 79 (03-30-24 @ 17:04) (68 - 95)  BP: 139/70 (03-30-24 @ 17:04) (121/73 - 159/74)  RR: 17 (03-30-24 @ 17:04) (16 - 17)  SpO2: 94% (03-30-24 @ 17:04) (94% - 99%)  Daily     Daily     Physical Exam:  General: in no acute distress, resting comfortably   Eyes: EOMI intact bilaterally. +Icteric sclerae, moist conjunctivae  HENT: Moist mucous membranes  Neck: Trachea midline, supple  Lungs: +crackles b/l, without wheezing   Cardiovascular: RRR. No murmurs, rubs, or gallops  Abdomen: +colostomy bag with blood, Soft, non-tender non-distended; No rebound or guarding  Extremities: WWP, No clubbing, cyanosis or edema  MSK: No midline bony tenderness. No CVA tenderness bilaterally  Neurological: Alert and oriented x3  Skin: Warm and dry. No obvious rash     Medications:  MEDICATIONS  (STANDING):  albuterol/ipratropium for Nebulization 3 milliLiter(s) Nebulizer every 6 hours  atorvastatin 20 milliGRAM(s) Oral at bedtime  cefTRIAXone   IVPB 1000 milliGRAM(s) IV Intermittent every 24 hours  cholestyramine Powder (Sugar-Free) 4 Gram(s) Oral every 24 hours  dextrose 5%. 1000 milliLiter(s) (100 mL/Hr) IV Continuous <Continuous>  dextrose 5%. 1000 milliLiter(s) (50 mL/Hr) IV Continuous <Continuous>  dextrose 50% Injectable 25 Gram(s) IV Push once  dextrose 50% Injectable 12.5 Gram(s) IV Push once  dextrose 50% Injectable 25 Gram(s) IV Push once  glucagon  Injectable 1 milliGRAM(s) IntraMuscular once  iron sucrose IVPB 300 milliGRAM(s) IV Intermittent every 24 hours  levothyroxine 50 MICROGram(s) Oral every 24 hours  lipid, fat emulsion (Fish Oil and Plant Based) 20% Infusion 0.5263 Gm/kG/Day (20.83 mL/Hr) IV Continuous <Continuous>  pancrelipase  (CREON 24,000 Lipase Units) 1 Capsule(s) Oral three times a day with meals  Parenteral Nutrition - Adult 1 Each TPN Continuous <Continuous>  Parenteral Nutrition - Adult 1 Each TPN Continuous <Continuous>  vancomycin  IVPB 1250 milliGRAM(s) IV Intermittent every 12 hours  venlafaxine XR. 300 milliGRAM(s) Oral every 24 hours    MEDICATIONS  (PRN):  acetaminophen     Tablet .. 650 milliGRAM(s) Oral every 6 hours PRN Temp greater or equal to 38C (100.4F), Mild Pain (1 - 3)  dextrose Oral Gel 15 Gram(s) Oral once PRN Blood Glucose LESS THAN 70 milliGRAM(s)/deciliter  guaiFENesin Oral Liquid (Sugar-Free) 100 milliGRAM(s) Oral every 6 hours PRN Cough  LORazepam     Tablet 0.5 milliGRAM(s) Oral at bedtime PRN Insomnia  trimethobenzamide Injectable 200 milliGRAM(s) IntraMuscular every 6 hours PRN Nausea and/or Vomiting      Labs:                        9.7    12.18 )-----------( 201      ( 30 Mar 2024 07:42 )             31.1     03-30    133<L>  |  100  |  32<H>  ----------------------------<  131<H>  3.7   |  31  |  1.30    Ca    8.6      30 Mar 2024 07:42  Phos  3.2     03-30  Mg     2.0     03-30    TPro  8.4<H>  /  Alb  2.2<L>  /  TBili  5.9<H>  /  DBili  x   /  AST  311<H>  /  ALT  170<H>  /  AlkPhos  118  03-30    PT/INR - ( 29 Mar 2024 05:30 )   PT: 13.7 sec;   INR: 1.21          PTT - ( 29 Mar 2024 05:30 )  PTT:29.4 sec  Urinalysis Basic - ( 30 Mar 2024 07:42 )    Color: x / Appearance: x / SG: x / pH: x  Gluc: 131 mg/dL / Ketone: x  / Bili: x / Urobili: x   Blood: x / Protein: x / Nitrite: x   Leuk Esterase: x / RBC: x / WBC x   Sq Epi: x / Non Sq Epi: x / Bacteria: x      SARS-CoV-2: NotDetec (15 Sukhdev 2024 11:15)  SARS-CoV-2: NotDetec (24 Nov 2023 00:09)  SARS-CoV-2: NotDetec (15 Nov 2023 12:11)  COVID-19 PCR: NotDetec (14 Nov 2023 20:59)  SARS-CoV-2: NotDetec (31 Oct 2023 09:16)      Radiology: Reviewed

## 2024-03-30 NOTE — PROGRESS NOTE ADULT - PROBLEM SELECTOR PLAN 3
3/30 pt complaining of feeling congested, requesting lasix. CXR with congestion and/or infiltrates. WBC uptrending 9->11->12. O2 requirements worsening but now satting 94% on 4L NC. Per private nurse pt developed a productive cough as of 3/27 and his oxygen demands are new as of today. Meeting 1/4 SIRS criteria for WBC (off b-blocker for bleed). Denies fevers/chills.   S/p 20mg Lasix IVP   - S/p one dose Vanc 1250mg q12h and Cefepime 2g x3 doses(iso penicillin allergy)  - Continue with treatment as stated above 3/30 pt complaining of feeling congested, requesting lasix. CXR with congestion and/or infiltrates. WBC uptrending 9->11->12. O2 requirements worsening but now satting 94% on 4L NC. Per private nurse pt developed a productive cough as of 3/27 and his oxygen demands are new as of today. Meeting 1/4 SIRS criteria for WBC (off b-blocker for bleed). Denies fevers/chills.   S/p 20mg Lasix IVP x2    Plan:   - S/p one dose Vanc 1250mg q12h and Cefepime 2g x3 doses(iso penicillin allergy)  - Continue with treatment as stated above

## 2024-03-30 NOTE — PROGRESS NOTE ADULT - PROBLEM SELECTOR PLAN 11
F: as above  E: replete as needed  N: regular/TPN  VTE Prophylaxis: SCDs   Activity: ambulate as tolerated  D: 7L   FULL CODE

## 2024-03-30 NOTE — PROGRESS NOTE ADULT - PROBLEM SELECTOR PLAN 4
Patient with downtrending hgb, likely 2/2 oozing from stoma site. On admission, hgb 7.6 (had received 2u pRBC 2 days ago for hgb 5.9). Hematology consulted given ongoing anemia and the need for multiple blood transfusions, with c/f MDS.   Total iron 35, TIBC wnl. S/p epogen 150U/kG SQ x 1.     Plan:   - F/u heme onc recs: MDS workup - f/u SPEP, UPEP, serum free light chains, serum immunoglobulins   - transfuse to Hgb > 7  - maintain active T&S  - maintain two large bore IVs  - Wound care for ostomy hemostasis

## 2024-03-30 NOTE — PROGRESS NOTE ADULT - PROBLEM SELECTOR PLAN 1
Patient with Hgb 7.7 on outpatient labs, repeat here 7.6. Last discharged with Hgb 8.2 from the ED two days ago after receiving 2u pRBC. Likely due to continued oozing from stoma site. Hematology consulted given ongoing anemia and the need for multiple blood transfusions.   Total iron 35, TIBC wnl. S/p epogen 150U/kG SQ x 1. S/p ~2/3 unit RBC, transfusing second unit  Heme-onc consulted  - F/u heme onc recs  - transfuse to Hgb > 7  - maintain active T&S  - maintain two large bore IVs  - f/u 6pm CBC

## 2024-03-30 NOTE — PROGRESS NOTE ADULT - SUBJECTIVE AND OBJECTIVE BOX
OVERNIGHT EVENTS:    SUBJECTIVE / INTERVAL HPI: Patient seen and examined at bedside.     VITAL SIGNS:  Vital Signs Last 24 Hrs  T(C): 36.7 (30 Mar 2024 04:47), Max: 36.8 (29 Mar 2024 16:15)  T(F): 98 (30 Mar 2024 04:47), Max: 98.2 (29 Mar 2024 16:15)  HR: 78 (30 Mar 2024 04:47) (68 - 88)  BP: 144/74 (30 Mar 2024 04:47) (120/84 - 157/75)  BP(mean): --  RR: 16 (30 Mar 2024 04:47) (16 - 18)  SpO2: 94% (30 Mar 2024 04:47) (94% - 99%)    Parameters below as of 30 Mar 2024 04:47  Patient On (Oxygen Delivery Method): room air        PHYSICAL EXAM:    General: NAD  HEENT: NC/AT; PERRL, anicteric sclera; MMM  Neck: supple w/o palpable nodularity  Cardiovascular: +S1/S2; RRR  Respiratory: CTA B/L; no W/R/R  Gastrointestinal: soft, NT/ND; +BSx4  Extremities: WWP; no edema, clubbing or cyanosis  Vascular: 2+ radial, DP/PT pulses B/L  Neurological: AAOx3; no focal deficits    MEDICATIONS:  MEDICATIONS  (STANDING):  atorvastatin 20 milliGRAM(s) Oral at bedtime  cholestyramine Powder (Sugar-Free) 4 Gram(s) Oral every 24 hours  dextrose 5%. 1000 milliLiter(s) (100 mL/Hr) IV Continuous <Continuous>  dextrose 5%. 1000 milliLiter(s) (50 mL/Hr) IV Continuous <Continuous>  dextrose 50% Injectable 25 Gram(s) IV Push once  dextrose 50% Injectable 25 Gram(s) IV Push once  dextrose 50% Injectable 12.5 Gram(s) IV Push once  glucagon  Injectable 1 milliGRAM(s) IntraMuscular once  iron sucrose IVPB 300 milliGRAM(s) IV Intermittent every 24 hours  levothyroxine 50 MICROGram(s) Oral every 24 hours  lipid, fat emulsion (Fish Oil and Plant Based) 20% Infusion 0.5263 Gm/kG/Day (20.83 mL/Hr) IV Continuous <Continuous>  pancrelipase  (CREON 24,000 Lipase Units) 1 Capsule(s) Oral three times a day with meals  Parenteral Nutrition - Adult 1 Each TPN Continuous <Continuous>  venlafaxine XR. 300 milliGRAM(s) Oral every 24 hours    MEDICATIONS  (PRN):  acetaminophen     Tablet .. 650 milliGRAM(s) Oral every 6 hours PRN Temp greater or equal to 38C (100.4F), Mild Pain (1 - 3)  albuterol/ipratropium for Nebulization 3 milliLiter(s) Nebulizer every 6 hours PRN Shortness of Breath and/or Wheezing  dextrose Oral Gel 15 Gram(s) Oral once PRN Blood Glucose LESS THAN 70 milliGRAM(s)/deciliter  guaiFENesin Oral Liquid (Sugar-Free) 100 milliGRAM(s) Oral every 6 hours PRN Cough  LORazepam     Tablet 0.5 milliGRAM(s) Oral at bedtime PRN Insomnia      ALLERGIES:  Allergies    penicillin (Angioedema)    Intolerances        LABS:                        8.1    11.22 )-----------( 192      ( 29 Mar 2024 22:56 )             25.1     03-29    132<L>  |  100  |  40<H>  ----------------------------<  88  4.1   |  28  |  1.48<H>    Ca    8.2<L>      29 Mar 2024 05:30  Phos  4.7     03-29  Mg     2.3     03-29    TPro  7.5  /  Alb  2.0<L>  /  TBili  5.2<H>  /  DBili  x   /  AST  See Note  /  ALT  154<H>  /  AlkPhos  100  03-29    PT/INR - ( 29 Mar 2024 05:30 )   PT: 13.7 sec;   INR: 1.21          PTT - ( 29 Mar 2024 05:30 )  PTT:29.4 sec  Urinalysis Basic - ( 29 Mar 2024 05:30 )    Color: x / Appearance: x / SG: x / pH: x  Gluc: 88 mg/dL / Ketone: x  / Bili: x / Urobili: x   Blood: x / Protein: x / Nitrite: x   Leuk Esterase: x / RBC: x / WBC x   Sq Epi: x / Non Sq Epi: x / Bacteria: x      CAPILLARY BLOOD GLUCOSE      POCT Blood Glucose.: 91 mg/dL (29 Mar 2024 05:38)      RADIOLOGY & ADDITIONAL TESTS: Reviewed.   OVERNIGHT EVENTS: Episode of bleeding, lost ~500cc. Surgery came and cauterized source of bleed and applied hemostatic powder which stopped the bleed.    SUBJECTIVE / INTERVAL HPI: Patient seen and examined at bedside. Patient found sitting on chair. He said he felt short of breath earlier and feels better sitting upright. Says he needs lasix and knows his body. Denies further bleeding into stoma. ROS negative.     VITAL SIGNS:  Vital Signs Last 24 Hrs  T(C): 36.7 (30 Mar 2024 04:47), Max: 36.8 (29 Mar 2024 16:15)  T(F): 98 (30 Mar 2024 04:47), Max: 98.2 (29 Mar 2024 16:15)  HR: 78 (30 Mar 2024 04:47) (68 - 88)  BP: 144/74 (30 Mar 2024 04:47) (120/84 - 157/75)  BP(mean): --  RR: 16 (30 Mar 2024 04:47) (16 - 18)  SpO2: 94% (30 Mar 2024 04:47) (94% - 99%)    Parameters below as of 30 Mar 2024 04:47  Patient On (Oxygen Delivery Method): room air        PHYSICAL EXAM:    General: NAD, sitting in chair   HEENT: NC/AT; PERRL, scleral incterus; MMM  Neck: supple w/o palpable nodularity  Cardiovascular: +S1/S2; RRR  Respiratory:  mild bibasilar crackles without wheezing  Gastrointestinal: soft, NT/ND; Colostomy bag without blood.  Extremities: WWP; no edema, clubbing or cyanosis  Vascular: 2+ radial, DP/PT pulses B/L  Neurological: AAOx3; no focal deficits    MEDICATIONS:  MEDICATIONS  (STANDING):  atorvastatin 20 milliGRAM(s) Oral at bedtime  cholestyramine Powder (Sugar-Free) 4 Gram(s) Oral every 24 hours  dextrose 5%. 1000 milliLiter(s) (100 mL/Hr) IV Continuous <Continuous>  dextrose 5%. 1000 milliLiter(s) (50 mL/Hr) IV Continuous <Continuous>  dextrose 50% Injectable 25 Gram(s) IV Push once  dextrose 50% Injectable 25 Gram(s) IV Push once  dextrose 50% Injectable 12.5 Gram(s) IV Push once  glucagon  Injectable 1 milliGRAM(s) IntraMuscular once  iron sucrose IVPB 300 milliGRAM(s) IV Intermittent every 24 hours  levothyroxine 50 MICROGram(s) Oral every 24 hours  lipid, fat emulsion (Fish Oil and Plant Based) 20% Infusion 0.5263 Gm/kG/Day (20.83 mL/Hr) IV Continuous <Continuous>  pancrelipase  (CREON 24,000 Lipase Units) 1 Capsule(s) Oral three times a day with meals  Parenteral Nutrition - Adult 1 Each TPN Continuous <Continuous>  venlafaxine XR. 300 milliGRAM(s) Oral every 24 hours    MEDICATIONS  (PRN):  acetaminophen     Tablet .. 650 milliGRAM(s) Oral every 6 hours PRN Temp greater or equal to 38C (100.4F), Mild Pain (1 - 3)  albuterol/ipratropium for Nebulization 3 milliLiter(s) Nebulizer every 6 hours PRN Shortness of Breath and/or Wheezing  dextrose Oral Gel 15 Gram(s) Oral once PRN Blood Glucose LESS THAN 70 milliGRAM(s)/deciliter  guaiFENesin Oral Liquid (Sugar-Free) 100 milliGRAM(s) Oral every 6 hours PRN Cough  LORazepam     Tablet 0.5 milliGRAM(s) Oral at bedtime PRN Insomnia      ALLERGIES:  Allergies    penicillin (Angioedema)    Intolerances        LABS:                        8.1    11.22 )-----------( 192      ( 29 Mar 2024 22:56 )             25.1     03-29    132<L>  |  100  |  40<H>  ----------------------------<  88  4.1   |  28  |  1.48<H>    Ca    8.2<L>      29 Mar 2024 05:30  Phos  4.7     03-29  Mg     2.3     03-29    TPro  7.5  /  Alb  2.0<L>  /  TBili  5.2<H>  /  DBili  x   /  AST  See Note  /  ALT  154<H>  /  AlkPhos  100  03-29    PT/INR - ( 29 Mar 2024 05:30 )   PT: 13.7 sec;   INR: 1.21          PTT - ( 29 Mar 2024 05:30 )  PTT:29.4 sec  Urinalysis Basic - ( 29 Mar 2024 05:30 )    Color: x / Appearance: x / SG: x / pH: x  Gluc: 88 mg/dL / Ketone: x  / Bili: x / Urobili: x   Blood: x / Protein: x / Nitrite: x   Leuk Esterase: x / RBC: x / WBC x   Sq Epi: x / Non Sq Epi: x / Bacteria: x      CAPILLARY BLOOD GLUCOSE      POCT Blood Glucose.: 91 mg/dL (29 Mar 2024 05:38)      RADIOLOGY & ADDITIONAL TESTS: Reviewed.   ****TRANSFER FROM [facility] TO ****    Hospital course: 77yo M with PMH of AFib/Flutter s/p DCCV, Crohn's, prior ileocectomy and open appendectomy, extensive abdominal surgeries and ileostomy, short-bowel syndrome, hypothyroidism, depression, recently seen at the Emergency Department for blood transfusion and multiple recent admissions for transfusions px as a direct admission for recurrent anemia. Heme-onc consulted. Surgery consulted. S/p 3u pRBC. C/c/b increasing oxygen requirements and shortness of breath, improved with IV lasix. CXR with congestion and/or infiltrates. ICU consult placed for transfer to telemetry.     OVERNIGHT EVENTS: Episode of bleeding, lost ~500cc. Surgery came and cauterized source of bleed and applied hemostatic powder which stopped the bleed.    SUBJECTIVE / INTERVAL HPI: Patient seen and examined at bedside. Patient found sitting on chair. He said he felt short of breath earlier and feels better sitting upright. Says he needs lasix and knows his body. Denies further bleeding into stoma. ROS negative.     VITAL SIGNS:  Vital Signs Last 24 Hrs  T(C): 36.7 (30 Mar 2024 04:47), Max: 36.8 (29 Mar 2024 16:15)  T(F): 98 (30 Mar 2024 04:47), Max: 98.2 (29 Mar 2024 16:15)  HR: 78 (30 Mar 2024 04:47) (68 - 88)  BP: 144/74 (30 Mar 2024 04:47) (120/84 - 157/75)  BP(mean): --  RR: 16 (30 Mar 2024 04:47) (16 - 18)  SpO2: 94% (30 Mar 2024 04:47) (94% - 99%)    Parameters below as of 30 Mar 2024 04:47  Patient On (Oxygen Delivery Method): room air        PHYSICAL EXAM:    General: NAD, sitting in chair   HEENT: NC/AT; PERRL, scleral incterus; MMM  Neck: supple w/o palpable nodularity  Cardiovascular: +S1/S2; RRR  Respiratory:  mild bibasilar crackles without wheezing  Gastrointestinal: soft, NT/ND; Colostomy bag without blood.  Extremities: WWP; no edema, clubbing or cyanosis  Vascular: 2+ radial, DP/PT pulses B/L  Neurological: AAOx3; no focal deficits    MEDICATIONS:  MEDICATIONS  (STANDING):  atorvastatin 20 milliGRAM(s) Oral at bedtime  cholestyramine Powder (Sugar-Free) 4 Gram(s) Oral every 24 hours  dextrose 5%. 1000 milliLiter(s) (100 mL/Hr) IV Continuous <Continuous>  dextrose 5%. 1000 milliLiter(s) (50 mL/Hr) IV Continuous <Continuous>  dextrose 50% Injectable 25 Gram(s) IV Push once  dextrose 50% Injectable 25 Gram(s) IV Push once  dextrose 50% Injectable 12.5 Gram(s) IV Push once  glucagon  Injectable 1 milliGRAM(s) IntraMuscular once  iron sucrose IVPB 300 milliGRAM(s) IV Intermittent every 24 hours  levothyroxine 50 MICROGram(s) Oral every 24 hours  lipid, fat emulsion (Fish Oil and Plant Based) 20% Infusion 0.5263 Gm/kG/Day (20.83 mL/Hr) IV Continuous <Continuous>  pancrelipase  (CREON 24,000 Lipase Units) 1 Capsule(s) Oral three times a day with meals  Parenteral Nutrition - Adult 1 Each TPN Continuous <Continuous>  venlafaxine XR. 300 milliGRAM(s) Oral every 24 hours    MEDICATIONS  (PRN):  acetaminophen     Tablet .. 650 milliGRAM(s) Oral every 6 hours PRN Temp greater or equal to 38C (100.4F), Mild Pain (1 - 3)  albuterol/ipratropium for Nebulization 3 milliLiter(s) Nebulizer every 6 hours PRN Shortness of Breath and/or Wheezing  dextrose Oral Gel 15 Gram(s) Oral once PRN Blood Glucose LESS THAN 70 milliGRAM(s)/deciliter  guaiFENesin Oral Liquid (Sugar-Free) 100 milliGRAM(s) Oral every 6 hours PRN Cough  LORazepam     Tablet 0.5 milliGRAM(s) Oral at bedtime PRN Insomnia      ALLERGIES:  Allergies    penicillin (Angioedema)    Intolerances        LABS:                        8.1    11.22 )-----------( 192      ( 29 Mar 2024 22:56 )             25.1     03-29    132<L>  |  100  |  40<H>  ----------------------------<  88  4.1   |  28  |  1.48<H>    Ca    8.2<L>      29 Mar 2024 05:30  Phos  4.7     03-29  Mg     2.3     03-29    TPro  7.5  /  Alb  2.0<L>  /  TBili  5.2<H>  /  DBili  x   /  AST  See Note  /  ALT  154<H>  /  AlkPhos  100  03-29    PT/INR - ( 29 Mar 2024 05:30 )   PT: 13.7 sec;   INR: 1.21          PTT - ( 29 Mar 2024 05:30 )  PTT:29.4 sec  Urinalysis Basic - ( 29 Mar 2024 05:30 )    Color: x / Appearance: x / SG: x / pH: x  Gluc: 88 mg/dL / Ketone: x  / Bili: x / Urobili: x   Blood: x / Protein: x / Nitrite: x   Leuk Esterase: x / RBC: x / WBC x   Sq Epi: x / Non Sq Epi: x / Bacteria: x      CAPILLARY BLOOD GLUCOSE      POCT Blood Glucose.: 91 mg/dL (29 Mar 2024 05:38)      RADIOLOGY & ADDITIONAL TESTS: Reviewed.

## 2024-03-30 NOTE — PROGRESS NOTE ADULT - PROBLEM SELECTOR PLAN 4
Home med: crestor 5mg qd.   - c/w TI lipitor 20mg qhs Diagnosis of AFib/Flutter s/p DCCVs.   Home medication: lopressor 50 mg QD PO  - hold beta blocker given active bleed. Resume as appropriate.

## 2024-03-30 NOTE — CONSULT NOTE ADULT - SUBJECTIVE AND OBJECTIVE BOX
LETICIA LARRY  78y  Male      77yo M with PMH of AFib/Flutter s/p DCCV, Crohn's, prior ileocectomy and open appendectomy, extensive abdominal surgeries and ileostomy, short-bowel syndrome, hypothyroidism, depression, recently seen at the Emergency Department for blood transfusion and multiple recent admissions for transfusions who presents as a direct admission for recurrent anemia. Per Dr. Aguiar, patient has had recurrent bleeding from the ostomy site despite multiple interventions. Concern for hematologic disorder, and outpatient labs once again showing Hgb 7 (discharged from the ED two days ago with Hgb 8.2 after receiving 2u pRBCs). Patient seen at bedside. Reports he had oozing once again from the ostomy site, possibly triggered by coughing. Additionally, reports 1 episode of brief palpitations, when he rechecked his pulse it was in the 70s and symptoms had resolved. Concerned he may have recurrence of AFib, reports he has been off the Amiodarone. Also believes Gattex may be contributing to the frequency of bleeding, has been holding this medication for now. Denies chest pain, SOB.     On admission:  Initial Vital Signs: T 97.8 F, HR 87, /77, RR 16, SpO2 94% on room air  Labs: significant for WBC 10.94, Hgb 7.6, Na 132, BUN/Cr 43/1.43, AST//168  EKG: pending  Consults: hematology        PAST MEDICAL/SURGICAL HISTORY  PAST MEDICAL & SURGICAL HISTORY:  Essential hypertension  Hypertension      Atrial fibrillation  s/p cardioversion 2010 and 2014  Pt. reports 4 DCCV  Now on Amiodarone 200mg PO bid and Eliquis 5mg PO bid  Last DCCV 4 yrs ago at [facility]      Crohn's disease  s/p partial resection of ileum      Hyperlipidemia      Hypothyroidism      History of depression  On Venlafaxine ER 150mg PO bid      Junctional rhythm      Bradycardia      H/O knee surgery      History of cataract surgery      S/P appendectomy        T(C): 36.6 (03-30-24 @ 17:04), Max: 36.7 (03-30-24 @ 01:25)  HR: 79 (03-30-24 @ 17:04) (68 - 95)  BP: 139/70 (03-30-24 @ 17:04) (121/73 - 159/74)  RR: 17 (03-30-24 @ 17:04) (16 - 17)  SpO2: 94% (03-30-24 @ 17:04) (94% - 99%)  Wt(kg): --Vital Signs Last 24 Hrs  T(C): 36.6 (30 Mar 2024 17:04), Max: 36.7 (30 Mar 2024 01:25)  T(F): 97.8 (30 Mar 2024 17:04), Max: 98 (30 Mar 2024 01:25)  HR: 79 (30 Mar 2024 17:04) (68 - 95)  BP: 139/70 (30 Mar 2024 17:04) (121/73 - 159/74)  BP(mean): --  RR: 17 (30 Mar 2024 17:04) (16 - 17)  SpO2: 94% (30 Mar 2024 17:04) (94% - 99%)    Parameters below as of 30 Mar 2024 17:04  Patient On (Oxygen Delivery Method): nasal cannula        PHYSICAL EXAM:  GENERAL: NAD, well-groomed, well-developed  HEAD:  Atraumatic, Normocephalic  EYES: EOMI, PERRLA, conjunctiva and sclera clear  ENMT: No tonsillar erythema, exudates, or enlargement; Moist mucous membranes, Good dentition, No lesions  NECK: Supple, No JVD, Normal thyroid  NERVOUS SYSTEM:  Alert & Oriented X3, Good concentration; Motor Strength 5/5 B/L upper and lower extremities; DTRs 2+ intact and symmetric  CHEST/LUNG: Clear to percussion bilaterally; No rales, rhonchi, wheezing, or rubs  HEART: Regular rate and rhythm; No murmurs, rubs, or gallops  ABDOMEN: Soft, Nontender, Nondistended; Bowel sounds present  EXTREMITIES:  2+ Peripheral Pulses, No clubbing, cyanosis, or edema  LYMPH: No lymphadenopathy noted  SKIN: No rashes or lesions    Consultant(s) Notes Reviewed:  [x ] YES  [ ] NO  Care Discussed with Consultants/Other Providers [ x] YES  [ ] NO    LABS:  CBC   03-30-24 @ 07:42  Hematcorit 31.1  Hemoglobin 9.7  Mean Cell Hemoglobin 30.0  Platelet Count-Automated 201  RBC Count 3.23  Red Cell Distrib Width 17.0  Wbc Count 12.18  03-29-24 @ 22:56  Hematcorit 25.1  Hemoglobin 8.1  Mean Cell Hemoglobin 30.7  Platelet Count-Automated 192  RBC Count 2.64  Red Cell Distrib Width 17.1  Wbc Count 11.22  03-29-24 @ 18:42  Hematcorit 24.7  Hemoglobin 8.0  Mean Cell Hemoglobin 30.5  Platelet Count-Automated 192  RBC Count 2.62  Red Cell Distrib Width 17.0  Wbc Count 9.62      BMP  03-30-24 @ 07:42  Anion Gap. Serum 2  Blood Urea Nitrogen,Serm 32  Calcium, Total Serum 8.6  Carbon Dioxide, Serum 31  Chloride, Serum 100  Creatinine, Serum 1.30  eGFR in  --  eGFR in Non Afican American --  Gloucose, serum 131  Potassium, Serum 3.7  Sodium, Serum 133      03-29-24 @ 05:30  Anion Gap. Serum 4  Blood Urea Nitrogen,Serm 40  Calcium, Total Serum 8.2  Carbon Dioxide, Serum 28  Chloride, Serum 100  Creatinine, Serum 1.48  eGFR in  --  eGFR in Non Afican American --  Gloucose, serum 88  Potassium, Serum 4.1  Sodium, Serum 132      03-28-24 @ 22:15  Anion Gap. Serum 7  Blood Urea Nitrogen,Serm 43  Calcium, Total Serum 8.6  Carbon Dioxide, Serum 26  Chloride, Serum 99  Creatinine, Serum 1.43  eGFR in  --  eGFR in Non Afican American --  Gloucose, serum 73  Potassium, Serum 3.5  Sodium, Serum 132      CMP  03-30-24 @ 07:42  Elizabeth Aminotransferase(ALT/SGPT)170  Albumin, Serum 2.2  Alkaline Phosphatase, Serum 118  Anion Gap, Serum 2  Aspartate Aminotransferase (AST/SGOT)311  Bilirubin Total, Serum 5.9  Blood Urea Nitrogen, Serum 32  Calcium,Total Serum 8.6  Carbon Dioxide, Serum 31  Chloride, Serum 100  Creatinine, Serum 1.30  eGFR if  --  eGFR if Non African American --  Glucose, Serum 131  Potassium, Serum 3.7  Protein Total, Serum 8.4  Sodium, Serum 133      03-29-24 @ 05:30  Elizabeth Aminotransferase(ALT/SGPT)154  Albumin, Serum 2.0  Alkaline Phosphatase, Serum 100  Anion Gap, Serum 4  Aspartate Aminotransferase (AST/SGOT)See Note  Bilirubin Total, Serum 5.2  Blood Urea Nitrogen, Serum 40  Calcium,Total Serum 8.2  Carbon Dioxide, Serum 28  Chloride, Serum 100  Creatinine, Serum 1.48  eGFR if  --  eGFR if Non African American --  Glucose, Serum 88  Potassium, Serum 4.1  Protein Total, Serum 7.5  Sodium, Serum 132      03-28-24 @ 22:15  Elizabeth Aminotransferase(ALT/SGPT)168  Albumin, Serum 2.2  Alkaline Phosphatase, Serum 115  Anion Gap, Serum 7  Aspartate Aminotransferase (AST/SGOT)347  Bilirubin Total, Serum 5.3  Blood Urea Nitrogen, Serum 43  Calcium,Total Serum 8.6  Carbon Dioxide, Serum 26  Chloride, Serum 99  Creatinine, Serum 1.43  eGFR if  --  eGFR if Non African American --  Glucose, Serum 73  Potassium, Serum 3.5  Protein Total, Serum 8.2  Sodium, Serum 132      PT/INR  PT/INR  03-29-24 @ 05:30  INR 1.21  Prothrombin Time Comment --  Prothrobin Time, [id number]    Amylase/Lipase    RADIOLOGY & ADDITIONAL TESTS:    Imaging Personally Reviewed:  [X] YES  [ ] NO LETICIA LARRY    78y  Male      77yo M with PMH of AFib/Flutter s/p DCCV, Crohn's, prior ileocectomy and open appendectomy, extensive abdominal surgeries and ileostomy, short-bowel syndrome, hypothyroidism, depression, recently seen at the Emergency Department for blood transfusion and multiple recent admissions for transfusions who presents as a direct admission for recurrent anemia. Per Dr. Aguiar, patient has had recurrent bleeding from the ostomy site despite multiple interventions. Concern for hematologic disorder, and outpatient labs once again showing Hgb 7 (discharged from the ED two days ago with Hgb 8.2 after receiving 2u pRBCs). Patient seen at bedside. Reports he had oozing once again from the ostomy site, possibly triggered by coughing. Additionally, reports 1 episode of brief palpitations, when he rechecked his pulse it was in the 70s and symptoms had resolved. Concerned he may have recurrence of AFib, reports he has been off the Amiodarone. Also believes Gattex may be contributing to the frequency of bleeding, has been holding this medication for now. Denies chest pain, SOB.     On admission:  Initial Vital Signs: T 97.8 F, HR 87, /77, RR 16, SpO2 94% on room air  Labs: significant for WBC 10.94, Hgb 7.6, Na 132, BUN/Cr 43/1.43, AST//168  EKG: pending  Consults: hematology        PAST MEDICAL/SURGICAL HISTORY  PAST MEDICAL & SURGICAL HISTORY:  Essential hypertension  Hypertension      Atrial fibrillation  s/p cardioversion 2010 and 2014  Pt. reports 4 DCCV  Now on Amiodarone 200mg PO bid and Eliquis 5mg PO bid  Last DCCV [facility] yrs ago at [facility]      Crohn's disease  s/p partial resection of ileum      Hyperlipidemia      Hypothyroidism      History of depression  On Venlafaxine ER 150mg PO bid      Junctional rhythm      Bradycardia      H/O knee surgery      History of cataract surgery      S/P appendectomy        T(C): 36.6 (03-30-24 @ 17:04), Max: 36.7 (03-30-24 @ 01:25)  HR: 79 (03-30-24 @ 17:04) (68 - 95)  BP: 139/70 (03-30-24 @ 17:04) (121/73 - 159/74)  RR: 17 (03-30-24 @ 17:04) (16 - 17)  SpO2: 94% (03-30-24 @ 17:04) (94% - 99%)  Wt(kg): --Vital Signs Last 24 Hrs  T(C): 36.6 (30 Mar 2024 17:04), Max: 36.7 (30 Mar 2024 01:25)  T(F): 97.8 (30 Mar 2024 17:04), Max: 98 (30 Mar 2024 01:25)  HR: 79 (30 Mar 2024 17:04) (68 - 95)  BP: 139/70 (30 Mar 2024 17:04) (121/73 - 159/74)  BP(mean): --  RR: 17 (30 Mar 2024 17:04) (16 - 17)  SpO2: 94% (30 Mar 2024 17:04) (94% - 99%)    Parameters below as of 30 Mar 2024 17:04  Patient On (Oxygen Delivery Method): nasal cannula        PHYSICAL EXAM:  GENERAL: NAD, well-groomed, well-developed  HEAD:  Atraumatic, Normocephalic  EYES: EOMI, PERRLA, conjunctiva and sclera clear  ENMT: No tonsillar erythema, exudates, or enlargement; Moist mucous membranes, Good dentition, No lesions  NECK: Supple, No JVD, Normal thyroid  NERVOUS SYSTEM:  Alert & Oriented X3, Good concentration; Motor Strength 5/5 B/L upper and lower extremities; DTRs 2+ intact and symmetric  CHEST/LUNG: Clear to percussion bilaterally; No rales, rhonchi, wheezing, or rubs  HEART: Regular rate and rhythm; No murmurs, rubs, or gallops  ABDOMEN: Soft, Nontender, Nondistended; Bowel sounds present  EXTREMITIES:  2+ Peripheral Pulses, No clubbing, cyanosis, or edema  LYMPH: No lymphadenopathy noted  SKIN: No rashes or lesions    Consultant(s) Notes Reviewed:  [x ] YES  [ ] NO  Care Discussed with Consultants/Other Providers [ x] YES  [ ] NO    LABS:  CBC   03-30-24 @ 07:42  Hematcorit 31.1  Hemoglobin 9.7  Mean Cell Hemoglobin 30.0  Platelet Count-Automated 201  RBC Count 3.23  Red Cell Distrib Width 17.0  Wbc Count 12.18  03-29-24 @ 22:56  Hematcorit 25.1  Hemoglobin 8.1  Mean Cell Hemoglobin 30.7  Platelet Count-Automated 192  RBC Count 2.64  Red Cell Distrib Width 17.1  Wbc Count 11.22  03-29-24 @ 18:42  Hematcorit 24.7  Hemoglobin 8.0  Mean Cell Hemoglobin 30.5  Platelet Count-Automated 192  RBC Count 2.62  Red Cell Distrib Width 17.0  Wbc Count 9.62      BMP  03-30-24 @ 07:42  Anion Gap. Serum 2  Blood Urea Nitrogen,Serm 32  Calcium, Total Serum 8.6  Carbon Dioxide, Serum 31  Chloride, Serum 100  Creatinine, Serum 1.30  eGFR in  --  eGFR in Non Afican American --  Gloucose, serum 131  Potassium, Serum 3.7  Sodium, Serum 133      03-29-24 @ 05:30  Anion Gap. Serum 4  Blood Urea Nitrogen,Serm 40  Calcium, Total Serum 8.2  Carbon Dioxide, Serum 28  Chloride, Serum 100  Creatinine, Serum 1.48  eGFR in  --  eGFR in Non Afican American --  Gloucose, serum 88  Potassium, Serum 4.1  Sodium, Serum 132      03-28-24 @ 22:15  Anion Gap. Serum 7  Blood Urea Nitrogen,Serm 43  Calcium, Total Serum 8.6  Carbon Dioxide, Serum 26  Chloride, Serum 99  Creatinine, Serum 1.43  eGFR in  --  eGFR in Non Afican American --  Gloucose, serum 73  Potassium, Serum 3.5  Sodium, Serum 132      CMP  03-30-24 @ 07:42  Elizabeth Aminotransferase(ALT/SGPT)170  Albumin, Serum 2.2  Alkaline Phosphatase, Serum 118  Anion Gap, Serum 2  Aspartate Aminotransferase (AST/SGOT)311  Bilirubin Total, Serum 5.9  Blood Urea Nitrogen, Serum 32  Calcium,Total Serum 8.6  Carbon Dioxide, Serum 31  Chloride, Serum 100  Creatinine, Serum 1.30  eGFR if  --  eGFR if Non African American --  Glucose, Serum 131  Potassium, Serum 3.7  Protein Total, Serum 8.4  Sodium, Serum 133      03-29-24 @ 05:30  Elizabeth Aminotransferase(ALT/SGPT)154  Albumin, Serum 2.0  Alkaline Phosphatase, Serum 100  Anion Gap, Serum 4  Aspartate Aminotransferase (AST/SGOT)See Note  Bilirubin Total, Serum 5.2  Blood Urea Nitrogen, Serum 40  Calcium,Total Serum 8.2  Carbon Dioxide, Serum 28  Chloride, Serum 100  Creatinine, Serum 1.48  eGFR if  --  eGFR if Non African American --  Glucose, Serum 88  Potassium, Serum 4.1  Protein Total, Serum 7.5  Sodium, Serum 132      03-28-24 @ 22:15  Elizabeth Aminotransferase(ALT/SGPT)168  Albumin, Serum 2.2  Alkaline Phosphatase, Serum 115  Anion Gap, Serum 7  Aspartate Aminotransferase (AST/SGOT)347  Bilirubin Total, Serum 5.3  Blood Urea Nitrogen, Serum 43  Calcium,Total Serum 8.6  Carbon Dioxide, Serum 26  Chloride, Serum 99  Creatinine, Serum 1.43  eGFR if  --  eGFR if Non African American --  Glucose, Serum 73  Potassium, Serum 3.5  Protein Total, Serum 8.2  Sodium, Serum 132      PT/INR  PT/INR  03-29-24 @ 05:30  INR 1.21  Prothrombin Time Comment --  Prothrobin Time, [id number]    Amylase/Lipase    RADIOLOGY & ADDITIONAL TESTS:    Imaging Personally Reviewed:  [X] YES  [ ] NO LARRY KELLY    78y  Male      79yo M with PMH of AFib/Flutter s/p DCCV, Crohn's, prior ileocectomy and open appendectomy, extensive abdominal surgeries and ileostomy, short-bowel syndrome, hypothyroidism, depression, recently seen at the Emergency Department for blood transfusion and multiple recent admissions for transfusions px as a direct admission for recurrent anemia. Heme-onc consulted. Surgery consulted. S/p 3u pRBC. C/c/b increasing oxygen requirements and shortness of breath, improved with IV lasix. CXR with congestion and/or infiltrates. ICU consult placed for transfer to telemetry.     OVERNIGHT EVENTS: Episode of bleeding, lost ~500cc. Surgery came and cauterized source of bleed and applied hemostatic powder which stopped the bleed.    SUBJECTIVE / INTERVAL HPI: Patient seen and examined at bedside. Patient found sitting on chair. He said he felt short of breath earlier and feels better sitting upright. Says he needs lasix and knows his body. Denies further bleeding into stoma. ROS negative. After Lasix dose patient had output of 800 and reports improvement of SOB.         PAST MEDICAL/SURGICAL HISTORY  PAST MEDICAL & SURGICAL HISTORY:  Essential hypertension  Hypertension      Atrial fibrillation  s/p cardioversion 2010 and 2014  Pt. reports 4 DCCV  Now on Amiodarone 200mg PO bid and Eliquis 5mg PO bid  Last DCCV 4 yrs ago at [facility]      Crohn's disease  s/p partial resection of ileum      Hyperlipidemia      Hypothyroidism      History of depression  On Venlafaxine ER 150mg PO bid      Junctional rhythm      Bradycardia      H/O knee surgery      History of cataract surgery      S/P appendectomy        T(C): 36.6 (03-30-24 @ 17:04), Max: 36.7 (03-30-24 @ 01:25)  HR: 79 (03-30-24 @ 17:04) (68 - 95)  BP: 139/70 (03-30-24 @ 17:04) (121/73 - 159/74)  RR: 17 (03-30-24 @ 17:04) (16 - 17)  SpO2: 94% (03-30-24 @ 17:04) (94% - 99%)  Wt(kg): --Vital Signs Last 24 Hrs  T(C): 36.6 (30 Mar 2024 17:04), Max: 36.7 (30 Mar 2024 01:25)  T(F): 97.8 (30 Mar 2024 17:04), Max: 98 (30 Mar 2024 01:25)  HR: 79 (30 Mar 2024 17:04) (68 - 95)  BP: 139/70 (30 Mar 2024 17:04) (121/73 - 159/74)  BP(mean): --  RR: 17 (30 Mar 2024 17:04) (16 - 17)  SpO2: 94% (30 Mar 2024 17:04) (94% - 99%)    Parameters below as of 30 Mar 2024 17:04  Patient On (Oxygen Delivery Method): nasal cannula        PHYSICAL EXAM:  GENERAL: NAD, well-groomed, well-developed  HEAD:  Atraumatic, Normocephalic  EYES: EOMI, PERRLA, conjunctiva and sclera clear  ENMT: Moist mucous membranes  NERVOUS SYSTEM:  Alert & Oriented X3, Good concentration; Moving all extremities   CHEST/LUNG: mild inspiratory cracles BL lower lung fields   HEART: Regular rate and rhythm; No murmurs, rubs, or gallops  ABDOMEN: Soft, Nontender, Nondistended; ostomy in place with bilious output in bag  EXTREMITIES:  2+ Peripheral Pulses, No clubbing, cyanosis, or edema      Consultant(s) Notes Reviewed:  [x ] YES  [ ] NO  Care Discussed with Consultants/Other Providers [ x] YES  [ ] NO    LABS:  CBC   03-30-24 @ 07:42  Hematcorit 31.1  Hemoglobin 9.7  Mean Cell Hemoglobin 30.0  Platelet Count-Automated 201  RBC Count 3.23  Red Cell Distrib Width 17.0  Wbc Count 12.18  03-29-24 @ 22:56  Hematcorit 25.1  Hemoglobin 8.1  Mean Cell Hemoglobin 30.7  Platelet Count-Automated 192  RBC Count 2.64  Red Cell Distrib Width 17.1  Wbc Count 11.22  03-29-24 @ 18:42  Hematcorit 24.7  Hemoglobin 8.0  Mean Cell Hemoglobin 30.5  Platelet Count-Automated 192  RBC Count 2.62  Red Cell Distrib Width 17.0  Wbc Count 9.62      BMP  03-30-24 @ 07:42  Anion Gap. Serum 2  Blood Urea Nitrogen,Serm 32  Calcium, Total Serum 8.6  Carbon Dioxide, Serum 31  Chloride, Serum 100  Creatinine, Serum 1.30  eGFR in  --  eGFR in Non Afican American --  Gloucose, serum 131  Potassium, Serum 3.7  Sodium, Serum 133      03-29-24 @ 05:30  Anion Gap. Serum 4  Blood Urea Nitrogen,Serm 40  Calcium, Total Serum 8.2  Carbon Dioxide, Serum 28  Chloride, Serum 100  Creatinine, Serum 1.48  eGFR in  --  eGFR in Non Afican American --  Gloucose, serum 88  Potassium, Serum 4.1  Sodium, Serum 132      03-28-24 @ 22:15  Anion Gap. Serum 7  Blood Urea Nitrogen,Serm 43  Calcium, Total Serum 8.6  Carbon Dioxide, Serum 26  Chloride, Serum 99  Creatinine, Serum 1.43  eGFR in  --  eGFR in Non Afican American --  Gloucose, serum 73  Potassium, Serum 3.5  Sodium, Serum 132      CMP  03-30-24 @ 07:42  Elizaebth Aminotransferase(ALT/SGPT)170  Albumin, Serum 2.2  Alkaline Phosphatase, Serum 118  Anion Gap, Serum 2  Aspartate Aminotransferase (AST/SGOT)311  Bilirubin Total, Serum 5.9  Blood Urea Nitrogen, Serum 32  Calcium,Total Serum 8.6  Carbon Dioxide, Serum 31  Chloride, Serum 100  Creatinine, Serum 1.30  eGFR if  --  eGFR if Non African American --  Glucose, Serum 131  Potassium, Serum 3.7  Protein Total, Serum 8.4  Sodium, Serum 133      03-29-24 @ 05:30  Elizabeth Aminotransferase(ALT/SGPT)154  Albumin, Serum 2.0  Alkaline Phosphatase, Serum 100  Anion Gap, Serum 4  Aspartate Aminotransferase (AST/SGOT)See Note  Bilirubin Total, Serum 5.2  Blood Urea Nitrogen, Serum 40  Calcium,Total Serum 8.2  Carbon Dioxide, Serum 28  Chloride, Serum 100  Creatinine, Serum 1.48  eGFR if  --  eGFR if Non African American --  Glucose, Serum 88  Potassium, Serum 4.1  Protein Total, Serum 7.5  Sodium, Serum 132      03-28-24 @ 22:15  Elizabeth Aminotransferase(ALT/SGPT)168  Albumin, Serum 2.2  Alkaline Phosphatase, Serum 115  Anion Gap, Serum 7  Aspartate Aminotransferase (AST/SGOT)347  Bilirubin Total, Serum 5.3  Blood Urea Nitrogen, Serum 43  Calcium,Total Serum 8.6  Carbon Dioxide, Serum 26  Chloride, Serum 99  Creatinine, Serum 1.43  eGFR if  --  eGFR if Non African American --  Glucose, Serum 73  Potassium, Serum 3.5  Protein Total, Serum 8.2  Sodium, Serum 132      PT/INR  PT/INR  03-29-24 @ 05:30  INR 1.21  Prothrombin Time Comment --  Prothrobin Time, [id number]    Amylase/Lipase    RADIOLOGY & ADDITIONAL TESTS:    Imaging Personally Reviewed:  [X] YES  [ ] NO

## 2024-03-30 NOTE — PROGRESS NOTE ADULT - ASSESSMENT
Patient is a 77yo M with PMH of AFib/Flutter s/p DCCV, Crohn's, prior ileocectomy and open appendectomy, extensive abdominal surgeries and ileostomy, short-bowel syndrome, hypothyroidism, depression, recently seen at the Emergency Department for blood transfusion and multiple recent admissions for transfusions who presents as a direct admission for recurrent anemia. Course complicated by increasing 02 requirements and SOB, improved with IV Lasix stepped up to tele for further monitoring.

## 2024-03-30 NOTE — CONSULT NOTE ADULT - ASSESSMENT
77yo M with PMH of AFib/Flutter s/p DCCV, Crohn's, prior ileocectomy and open appendectomy, extensive abdominal surgeries and ileostomy, short-bowel syndrome, hypothyroidism, depression, recently seen at the Emergency Department for blood transfusion and multiple recent admissions for transfusions who presents as a direct admission for recurrent anemia.       # Normocytic Normochromic Anemia  Due to bleeding from pouch from ostomy and concern for MDS needing recurrent transfussion  Upon admission Hgb 7.6, hematocrit 24.1.   Patient noted to have blood clots on ostomy fistula pouch, that was evaluated by Surgery on 3/29 and said no active bleeding noted.   s/p Retacrit 10,000U 3/29  started iron sucrose 300 mg qd 3/29-3/31  s/p 3U pRBC as per 3/30  hemolysis labs 3/30: d-dimer 775, fibrinogen 280, JIM   - monitor CBC w differential   - monitor VS   - Active T&S  - Hem/Onc following   - Wound care for ostomy hemostasis   - MDS: SPEP, UPEP, serum free light chains, serum immunoglobulins     #increasing oxygen requierements  Patient rpeorting SOB, CXR 3/30/23 concerning for congestion, s/p 3 U pRBC and subsequent dose of lasix 20 mg IV on 3/30/24. TTE on 1/4/24: Hyperdynamic w LVEF >75%, moderate LVH, Grade II dyastolic dysfunction, PASP 40 mmHg, L pleural effusion.   MRCP 3/1/24: 2. Moderate bilateral pleural effusions and bibasilar focal atelectasis  s/p lasix 20 IV x1 3/30  - lasix w pRBC given grade II disfunction   - incentive spirometry   - strict I&O monitoring     #Leukocytosis   Mild increase in WBC 12.18 on 3/30 , patient has been a febrile as up to 3/30.   Concern for PNA concern for HAP. Low concren for aspiration.   - monitor CBC w differential  - obtain RVP, MRSA swab, Procal, Sputum Cx, BCx x2, UA w reflex, Sputum culture  - i concern for Crohn flare r/o infection: obatin GI PCR and C diff       #Crohns w/ ostomy   - c/w home creon 24,000U TID w meals     #Hypothyroidism  - c/w home Synthroid 50 mcg PO qd    #HLD   -c/w home atorvastatin 20 mg HS     #Depression/Anxiety   - c/w home venlafaxine  mg qd     #Insomnia   - c/w home lorazepam 0.5 mg HS PRN   79yo M with PMH of AFib/Flutter s/p DCCV, Crohn's, prior ileocectomy and open appendectomy, extensive abdominal surgeries and ileostomy, short-bowel syndrome, hypothyroidism, depression, recently seen at the Emergency Department for blood transfusion and multiple recent admissions for transfusions who presents as a direct admission for recurrent anemia.       # Normocytic Normochromic Anemia  Due to bleeding from pouch from ostomy and concern for MDS needing recurrent transfussion  Upon admission Hgb 7.6, hematocrit 24.1.   Patient noted to have blood clots on ostomy fistula pouch, that was evaluated by Surgery on 3/29 and said no active bleeding noted.   s/p Retacrit 10,000U 3/29  started iron sucrose 300 mg qd 3/29-3/31  s/p 3U pRBC as per 3/30  hemolysis labs 3/30: d-dimer 775, fibrinogen 280, JIM   - monitor CBC w differential   - monitor VS   - Active T&S  - Hem/Onc following   - Wound care for ostomy hemostasis   - MDS: SPEP, UPEP, serum free light chains, serum immunoglobulins     #increasing oxygen requirements  Patient reporting SOB, CXR 3/30/23 concerning for congestion, s/p 3 U pRBC and subsequent dose of lasix 20 mg IV on 3/30/24. TTE on 1/4/24: Hyperdynamic w LVEF >75%, moderate LVH, Grade II dyastolic dysfunction, PASP 40 mmHg, L pleural effusion.   MRCP 3/1/24: 2. Moderate bilateral pleural effusions and bibasilar focal atelectasis  s/p lasix 20 IV x1 3/30  - lasix w pRBC given grade II disfunction   - incentive spirometry   - strict I&O monitoring     #Leukocytosis   Mild increase in WBC 12.18 on 3/30 , patient has been a febrile as up to 3/30.   Concern for PNA concern for HAP. Low concren for aspiration.   - monitor CBC w differential  - obtain RVP, MRSA swab, Procal, Sputum Cx, BCx x2, UA w reflex, Sputum culture  - i concern for Crohn flare r/o infection: obatin GI PCR and C diff       #Crohns w/ ostomy   - c/w home creon 24,000U TID w meals     #Hypothyroidism  - c/w home Synthroid 50 mcg PO qd    #HLD   -c/w home atorvastatin 20 mg HS     #Depression/Anxiety   - c/w home venlafaxine  mg qd     #Insomnia   - c/w home lorazepam 0.5 mg HS PRN   79yo M with PMH of AFib/Flutter s/p DCCV, Crohn's, prior ileocectomy and open appendectomy, extensive abdominal surgeries and ileostomy, short-bowel syndrome, hypothyroidism, depression, recently seen at the Emergency Department for blood transfusion and multiple recent admissions for transfusions who presents as a direct admission for recurrent anemia. ICU consulted for hypoxia.       # Normocytic Normochromic Anemia  Due to bleeding from pouch from ostomy and concern for MDS needing recurrent transfusion  Upon admission Hgb 7.6, hematocrit 24.1.   Patient noted to have blood clots on ostomy fistula pouch, that was evaluated by Surgery on 3/29 and said no active bleeding noted.   s/p Retacrit 10,000U 3/29  started iron sucrose 300 mg qd 3/29-3/31  s/p 3U pRBC as per 3/30  hemolysis labs 3/30: d-dimer 775, fibrinogen 280, JIM   - monitor CBC w differential   - monitor VS   - Active T&S  - Transfuse if Hgb <7  - Hem/Onc following   - Wound care for ostomy hemostasis   - MDS: SPEP, UPEP, serum free light chains, serum immunoglobulins     #increasing oxygen requirements  Patient reporting SOB, CXR 3/30/23 concerning for congestion, s/p 3 U pRBC and subsequent dose of lasix 20 mg IV on 3/30/24. TTE on 1/4/24: Hyperdynamic w LVEF >75%, moderate LVH, Grade II dyastolic dysfunction, PASP 40 mmHg, L pleural effusion.   MRCP 3/1/24: 2. Moderate bilateral pleural effusions and bibasilar focal atelectasis  s/p lasix 20 IV x1 3/30  - lasix w pRBC given grade II disfunction   - pulmonary toilet: duonebs and hypertonic saline   - incentive spirometry   - strict I&O monitoring     #Leukocytosis   Mild increase in WBC 12.18 on 3/30 , patient has been a febrile as up to 3/30. Patient with cough since wednesday prior to admission and reports having white sputum production. No fever, chills, nausea, vomiting, changes in ostomy output nor dysuria.   Concern for PNA concern for HAP. Low concern for aspiration.   - monitor CBC w differential  - obtain RVP, MRSA swab, Strep and legionella, Procal, Sputum Cx, BCx x2, UA w reflex, Sputum culture  - start CTX 1 gram q24hrs    #Crohns w/ ostomy   - c/w home creon 24,000U TID w meals   - c/w ostomy bag care by personal wound care     #Hypothyroidism  - c/w home Synthroid 50 mcg PO qd    #HLD   -c/w home atorvastatin 20 mg HS     #Depression/Anxiety   - c/w home venlafaxine  mg qd     #Insomnia   - c/w home lorazepam 0.5 mg HS PRN    DISPO Lachman

## 2024-03-30 NOTE — PROGRESS NOTE ADULT - PROBLEM SELECTOR PLAN 7
Pt with multiple surgery, s/p ostomy placement.     - C/w home creon 24,000U TID w meals   - C/w ostomy bag care by personal wound care Pt with multiple surgery, s/p ostomy placement.   - C/w home creon 24,000U TID w meals   - C/w ostomy bag care by personal wound care

## 2024-03-30 NOTE — PROGRESS NOTE ADULT - PROBLEM SELECTOR PLAN 5
History of Short bowel syndrome. On prior admission, high output w/ EC fistula. On TPN at home, also on regular diet.  Continued TPN  - hold home gattrex at this time  - Q6H fingersticks   - Monitor EC fistula output. History of Short bowel syndrome. On prior admission, high output w/ EC fistula. On TPN at home, also on regular diet.  Continued TPN    Plan:  - hold home gattrex at this time  - Q6H fingersticks   - Monitor EC fistula output.

## 2024-03-30 NOTE — PROGRESS NOTE ADULT - SUBJECTIVE AND OBJECTIVE BOX
SUBJECTIVE: Patient seen and examined at bedside with wife and home nurse present. Patient without any acute complaints. No further episodes of bleeding throughout today.      Vital Signs Last 24 Hrs  T(C): 36.7 (30 Mar 2024 04:47), Max: 36.7 (30 Mar 2024 01:25)  T(F): 98 (30 Mar 2024 04:47), Max: 98 (30 Mar 2024 01:25)  HR: 95 (30 Mar 2024 13:22) (68 - 95)  BP: 159/74 (30 Mar 2024 13:22) (121/73 - 159/74)  BP(mean): --  RR: 16 (30 Mar 2024 13:22) (16 - 16)  SpO2: 95% (30 Mar 2024 13:22) (94% - 99%)    Parameters below as of 30 Mar 2024 13:22  Patient On (Oxygen Delivery Method): nasal cannula      I&O's Detail    29 Mar 2024 07:01  -  30 Mar 2024 07:00  --------------------------------------------------------  IN:    Fat Emulsion (Fish Oil &amp; Plant Based) 20% Infusion: 208 mL    Oral Fluid: 720 mL    RBC Washed Cells: 350 mL    TPN (Total Parenteral Nutrition): 900 mL  Total IN: 2178 mL    OUT:    Drain (mL): 250 mL    Ileostomy (mL): 610 mL    Voided (mL): 1100 mL  Total OUT: 1960 mL    Total NET: 218 mL      30 Mar 2024 07:01  -  30 Mar 2024 16:38  --------------------------------------------------------  IN:    Oral Fluid: 340 mL    TPN (Total Parenteral Nutrition): 580 mL  Total IN: 920 mL    OUT:    Drain (mL): 50 mL    Ileostomy (mL): 10 mL    Voided (mL): 650 mL  Total OUT: 710 mL    Total NET: 210 mL          General: NAD, resting comfortably in bed  C/V: NSR  Pulm: Nonlabored breathing, no respiratory distress  Abd: soft, NT/ND, midline wound manager in place with healthy granulation tissue without bleeding, bilious output in bag  Extrem: WWP, no edema, SCDs in place      LABS:                        9.7    12.18 )-----------( 201      ( 30 Mar 2024 07:42 )             31.1     03-30    133<L>  |  100  |  32<H>  ----------------------------<  131<H>  3.7   |  31  |  1.30    Ca    8.6      30 Mar 2024 07:42  Phos  3.2     03-30  Mg     2.0     03-30    TPro  8.4<H>  /  Alb  2.2<L>  /  TBili  5.9<H>  /  DBili  x   /  AST  311<H>  /  ALT  170<H>  /  AlkPhos  118  03-30    PT/INR - ( 29 Mar 2024 05:30 )   PT: 13.7 sec;   INR: 1.21          PTT - ( 29 Mar 2024 05:30 )  PTT:29.4 sec  Urinalysis Basic - ( 30 Mar 2024 07:42 )    Color: x / Appearance: x / SG: x / pH: x  Gluc: 131 mg/dL / Ketone: x  / Bili: x / Urobili: x   Blood: x / Protein: x / Nitrite: x   Leuk Esterase: x / RBC: x / WBC x   Sq Epi: x / Non Sq Epi: x / Bacteria: x        RADIOLOGY & ADDITIONAL STUDIES:

## 2024-03-30 NOTE — PROGRESS NOTE ADULT - PROBLEM SELECTOR PLAN 3
Diagnosis of AFib/Flutter s/p DCCVs.   Home medication: lopressor 50 mg QD PO  - hold beta blocker given active bleed. Resume as appropriate. History of Short bowel syndrome. On prior admission, high output w/ EC fistula. On TPN at home, also on regular diet.  Continued TPN  - hold home gattrex at this time  - Q6H fingersticks   - Monitor EC fistula output.

## 2024-03-30 NOTE — PROGRESS NOTE ADULT - PROBLEM SELECTOR PLAN 1
Patient reporting SOB, CXR 3/30/23 concerning for congestion, s/p 3 U pRBC and subsequent dose of lasix 20 mg IV on 3/30/24.   - TTE on 1/4/24: Hyperdynamic w LVEF >75%, moderate LVH, Grade II dyastolic dysfunction, PASP 40 mmHg, L pleural effusion.   - MRCP 3/1/24: 2. Moderate bilateral pleural effusions and bibasilar focal atelectasis    Plan:   - Pulmonary toilet: duonebs and hypertonic saline   - Incentive spirometry   - Strict I&O monitoring

## 2024-03-30 NOTE — PROGRESS NOTE ADULT - ASSESSMENT
79yo M with PMH of AFib/Flutter s/p DCCV, Crohn's, prior ileocectomy and open appendectomy, extensive abdominal surgeries and ileostomy, short-bowel syndrome, hypothyroidism, depression, recently seen at the Emergency Department for blood transfusion and multiple recent admissions for transfusions who presents as a direct admission for recurrent anemia.     Closely monitor for bleeding  Transfuse as needed for goal >7  Maintain active T&S  Continue to trend hgb  Surgery Team 5c will continue to follow

## 2024-03-31 NOTE — PROGRESS NOTE ADULT - SUBJECTIVE AND OBJECTIVE BOX
LARRY TREJO  78y  Male      Patient is a 78y old  Male who presents with a chief complaint of blood loss anemia (31 Mar 2024 08:45)      INTERVAL HPI/OVERNIGHT EVENTS: Overnight patient noted to be in a fib with RVR and tachypneic, given lasix 20 mg IV which helped SOB. However, given increased work of breathing, placed on BIPAP overnight. RVP collected at that time determined to be COVID +. When reassessed noted to be breathing more comfortably and in rate controlled a fib. This morning, Dr. Trejo reports improvement in breathing, denying SOB, iWOB, dypsnea, CP, N/V or abdominal pain. There was concern that when patient coughs there is blood oozing and shooting into from his stoma into ostomy bag. Surgery came to evaluate and stated no acute intervention at this time.     Vital Signs Last 24 Hrs  T(C): 36.9 (31 Mar 2024 10:47), Max: 37.2 (31 Mar 2024 00:53)  T(F): 98.4 (31 Mar 2024 10:47), Max: 98.9 (31 Mar 2024 00:53)  HR: 78 (31 Mar 2024 12:30) (77 - 99)  BP: 122/58 (31 Mar 2024 08:41) (114/58 - 159/74)  BP(mean): 83 (31 Mar 2024 08:41) (81 - 92)  RR: 22 (31 Mar 2024 08:41) (16 - 25)  SpO2: 99% (31 Mar 2024 12:30) (93% - 99%)    Parameters below as of 31 Mar 2024 08:41  Patient On (Oxygen Delivery Method): BiPAP/CPAP  O2 Flow (L/min): 40      PHYSICAL EXAM:  GENERAL: NAD, well-groomed, well-developed; pleasant, alert and conversant   HEAD:  Atraumatic, Normocephalic  EYES: EOMI,  conjunctiva and sclera clear  ENMT: Moist mucous membranes, Good dentition  NERVOUS SYSTEM:  Alert & Oriented X3, Good concentration  CHEST/LUNG: Clear to auscultation bilaterally; No crackles appreciated; breathing comfortably with BIPAP  HEART: Regular rate and rhythm; No murmurs, rubs, or gallops  ABDOMEN: Soft, Nontender, ostomy in place on left with brown red drainage in ostomy bag, oozing blood observed   EXTREMITIES:  2+ Peripheral Pulses, no edema     Consultant(s) Notes Reviewed:  [x ] YES  [ ] NO  Care Discussed with Consultants/Other Providers [ x] YES  [ ] NO    LABS:                        7.9    8.54  )-----------( 163      ( 31 Mar 2024 05:30 )             24.0     03-31    140  |  101  |  35<H>  ----------------------------<  177<H>  3.1<L>   |  33<H>  |  1.40<H>    Ca    7.7<L>      31 Mar 2024 05:30  Phos  1.5     03-31  Mg     2.3     03-31    TPro  7.0  /  Alb  2.0<L>  /  TBili  4.5<H>  /  DBili  x   /  AST  232<H>  /  ALT  137<H>  /  AlkPhos  95  03-31      Urinalysis Basic - ( 31 Mar 2024 05:30 )    Color: x / Appearance: x / SG: x / pH: x  Gluc: 177 mg/dL / Ketone: x  / Bili: x / Urobili: x   Blood: x / Protein: x / Nitrite: x   Leuk Esterase: x / RBC: x / WBC x   Sq Epi: x / Non Sq Epi: x / Bacteria: x      ABG - ( 31 Mar 2024 02:41 )  pH, Arterial: 7.51  pH, Blood: x     /  pCO2: 47    /  pO2: 90    / HCO3: 38    / Base Excess: 12.7  /  SaO2: 98.8              CAPILLARY BLOOD GLUCOSE          RADIOLOGY & ADDITIONAL TESTS:    Imaging Personally Reviewed:  [ ] YES  [ ] NO

## 2024-03-31 NOTE — PROGRESS NOTE ADULT - PROBLEM SELECTOR PLAN 3
Patient with downtrending hgb, likely 2/2 oozing from stoma site. On admission, hgb 7.6 (had received 2u pRBC 2 days ago for hgb 5.9). Hematology consulted given ongoing anemia and the need for multiple blood transfusions, with c/f MDS.     Plan:   - F/u heme onc recs: MDS workup - f/u SPEP, UPEP, serum free light chains, serum immunoglobulins   - F/up surgery recs for constant blood oozing from stoma.   - transfuse to Hgb > 7  - maintain active T&S  - maintain two large bore IVs  - Wound care for ostomy hemostasis

## 2024-03-31 NOTE — PROGRESS NOTE ADULT - ASSESSMENT
----- Message from Jaci Butcher MD sent at 11/7/2019  8:26 AM CST -----  Please inform pt. Of his lab result  Normal for Kidney and electrolyte    A1c at 7.4  Have pt. Continue with current medicating ( insulin same dosage )  Advise with diet and weight loss, daily exercises and weight loss.  Follow up with PCP in 4- 6 months  thanks   Patient is a 77yo M with PMH of AFib/Flutter s/p DCCV, Crohn's, prior ileocectomy and open appendectomy, extensive abdominal surgeries and ileostomy, short-bowel syndrome, hypothyroidism, depression, recently seen at the Emergency Department for blood transfusion and multiple recent admissions for transfusions who presents as a direct admission for recurrent anemia. Course complicated by increasing 02 requirements and SOB, improved with IV Lasix stepped up to tele for further monitoring. Noted to be COVID positive, breathing comfortably with nasal cannula; remdesivir started. Surgery made aware of blood oozing from ostomy.

## 2024-03-31 NOTE — PROGRESS NOTE ADULT - PROBLEM SELECTOR PLAN 1
Found to be COVID-19 positive; Mild increase in WBC 12.18 on 3/30 , afebrile, endorses white sputum production. Overnight. patient reported SOB, CXR 3/30/23 concerning for congestion, s/p 3 U pRBC and subsequent dose of lasix 20 mg IV on 3/30/24. Given concern that patient flashed, given lasix which improved symptoms. However, given initial iWOB started on remesidivir and decadron. Patient initially on BIPAP overnight given SOB --> weaned to NC.     Plan:  - c/w weaning O2 as tolerated   - c/w remdesivir 100 mg QD  - c/w decadron 6 mg IV QD

## 2024-03-31 NOTE — PROGRESS NOTE ADULT - PROBLEM SELECTOR PLAN 6
Pt with multiple surgery, s/p ostomy placement. Patient having bleeding from ostomy site. Surgery following for intervention.   - C/w home creon 24,000U TID w meals   - C/w ostomy bag care by personal wound care

## 2024-03-31 NOTE — PROGRESS NOTE ADULT - SUBJECTIVE AND OBJECTIVE BOX
COVID positive  On treatment and antibiotics  Feels better this am VS stable Afeb VR 80s Chest clear ant No edema Good UO Labs reviewed

## 2024-03-31 NOTE — PROGRESS NOTE ADULT - PROBLEM SELECTOR PLAN 2
3/30 pt complaining of feeling congested, requesting lasix. CXR with congestion and/or infiltrates. WBC uptrending 9->11->12. Per private nurse pt developed a productive cough as of 3/27 and his oxygen demands are new as of today. Meeting 1/4 SIRS criteria for WBC (off b-blocker for bleed). Denies fevers/chills.   S/p 20mg Lasix IVP x2; BIPAP. Found to be COVID positive, continuing with empiric CAP coverage given new presentation and elevated procal 0.42    Plan:   - S/p one dose Vanc 1250mg q12h and Cefepime 2g x3 doses(iso penicillin allergy)  - C/w COVID treatment  - c/w CTX 1 g QD

## 2024-03-31 NOTE — PROGRESS NOTE ADULT - PROBLEM SELECTOR PLAN 4
History of Short bowel syndrome. On prior admission, high output w/ EC fistula. On TPN at home, also on regular diet.  Continued TPN    Plan:  - hold home gattrex at this time  - Q6H fingersticks   - Monitor EC fistula output.

## 2024-04-01 NOTE — DIETITIAN INITIAL EVALUATION ADULT - ADD RECOMMEND
-Continue Regular diet   -Continue parenteral nutrition support via PICC    *Recommend continue regimen of 1.6L over 12hrs with 250 gDex, 112 gAA, and 50 gSMOF per day to provide 1789 calories and 112 gProt. with GIR of 3.64 mg/kg/min.    *TPN to provide ~80% of estimated needs    *Zn and Cu x3/week   -Monitor tolerance to TPN and regular diet   -Align nutrition with goals of care at all times  -Weekly wts  -Monitor chemistry, GI function, and skin integrity

## 2024-04-01 NOTE — PROGRESS NOTE ADULT - PROBLEM SELECTOR PLAN 7
Home med: crestor 5mg qd.   - C/w therapeutic conversion lipitor 20mg qhs  - f/up repeat TG level given TPN

## 2024-04-01 NOTE — PROGRESS NOTE ADULT - PROBLEM SELECTOR PLAN 4
History of Short bowel syndrome. On prior admission, high output w/ EC fistula. On TPN at home, also on regular diet.  Continued TPN    Plan:  - hold home gattrex at this time  - c/w TPN  - started luca   - Q6H fingersticks   - Monitor EC fistula output.

## 2024-04-01 NOTE — DIETITIAN INITIAL EVALUATION ADULT - OTHER INFO
Patient is a 77yo M with PMH of AFib/Flutter s/p DCCV, Crohn's, prior ileocectomy and open appendectomy, extensive abdominal surgeries and ileostomy, short-bowel syndrome, hypothyroidism, depression, recently seen at the Emergency Department for blood transfusion and multiple recent admissions for transfusions who presents as a direct admission for recurrent anemia.     Pt assessed on 7LA. Rx and labs reviewed. Pt familiar to clinical nutrition service from previous admissions; pt presents this time for recurrent anemia. Surgery following who cauterized bleed; monitor HHb from improvement. Pt continues to rely on TPN for supplemental nutrition support given extensive GI resection and SBS.  Patient is a 79yo M with PMH of AFib/Flutter s/p DCCV, Crohn's, prior ileocectomy and open appendectomy, extensive abdominal surgeries and ileostomy, short-bowel syndrome, hypothyroidism, depression, recently seen at the Emergency Department for blood transfusion and multiple recent admissions for transfusions who presents as a direct admission for recurrent anemia.     Pt assessed on 7LA. Rx and labs reviewed. Pt familiar to clinical nutrition service from previous admissions; pt presents this time for recurrent anemia. Surgery following who cauterized bleed; monitor HHb from improvement. Pt continues to rely on TPN for supplemental nutrition support given extensive GI resection and SBS. Pt continues on home TPN regimen; see below. Pt with reported usual body weight of 210 pounds; current body weight of 210 pounds. No ht in EMR; previously documented ht of 188 cm. No complaints of nausea/vomiting/constipation/diarrhea or difficult chew/swallow. NKA to food. RDN will continue to reassess, intervene, and monitor as appropriate.     Pain: 0 per chart review  GI: Abdomen non-distended/non-tender, +BS x4, last bowel movement PTA -ileostomy, fistula   Skin: Ileostomy, fistula, +2 bilateral upper extremities

## 2024-04-01 NOTE — DIETITIAN INITIAL EVALUATION ADULT - PROBLEM SELECTOR PLAN 7
[Dear  ___] : Dear  [unfilled], [Consult Letter:] : I had the pleasure of evaluating your patient, [unfilled]. [Please see my note below.] : Please see my note below. [Consult Closing:] : Thank you very much for allowing me to participate in the care of this patient.  If you have any questions, please do not hesitate to contact me. [Sincerely,] : Sincerely, [FreeTextEntry3] : Alexia Ortega MD\par Pediatric Otolaryngology / Head and Neck Surgery\par \par [facility]\par [address]\[place] [address]\par Tel [phone]\par Fax [phone]\par \par [address], [address]\[place] [address] \par Tel [phone]\par Fax [phone]  F: as above  E: replete as needed  N: regular/TPN  VTE Prophylaxis: SCDs   Activity: ambulate as tolerated  D: RMF

## 2024-04-01 NOTE — PROGRESS NOTE ADULT - SUBJECTIVE AND OBJECTIVE BOX
SUBJECTIVE: Patient seen and examined bedside. Pt reports feeling well today with no acute complaints. In good spirits. Denies respiratory symptoms related to covid infection. Tolerating diet. Reports ambulating yesterday. Tolerating diet.     cefTRIAXone   IVPB 1000 milliGRAM(s) IV Intermittent every 24 hours  remdesivir  IVPB   IV Intermittent   remdesivir  IVPB 100 milliGRAM(s) IV Intermittent every 24 hours      Vital Signs Last 24 Hrs  T(C): 36.6 (01 Apr 2024 04:50), Max: 37.1 (31 Mar 2024 14:47)  T(F): 97.8 (01 Apr 2024 04:50), Max: 98.7 (31 Mar 2024 14:47)  HR: 86 (01 Apr 2024 05:35) (76 - 216)  BP: 110/51 (01 Apr 2024 04:15) (110/51 - 142/65)  BP(mean): 73 (01 Apr 2024 04:15) (73 - 94)  RR: 18 (01 Apr 2024 05:35) (18 - 29)  SpO2: 93% (01 Apr 2024 05:35) (93% - 100%)    Parameters below as of 01 Apr 2024 05:35  Patient On (Oxygen Delivery Method): nasal cannula w/ humidification  O2 Flow (L/min): 2    I&O's Detail    31 Mar 2024 07:01  -  01 Apr 2024 07:00  --------------------------------------------------------  IN:    Fat Emulsion (Fish Oil &amp; Plant Based) 20% Infusion: 189 mL    TPN (Total Parenteral Nutrition): 900 mL  Total IN: 1089 mL    OUT:    Drain (mL): 2925 mL    Ileostomy (mL): 100 mL    Voided (mL): 790 mL  Total OUT: 3815 mL    Total NET: -2726 mL          General: NAD, resting comfortably in bed  C/V: NSR  Pulm: Nonlabored breathing, no respiratory distress  Abd: soft, NT/ND, midline wound manager in place with healthy granulation tissue without bleeding, bilious output in bag  Extrem: WWP, no edema, SCDs in place        LABS:                        8.1    14.15 )-----------( 205      ( 01 Apr 2024 05:30 )             25.0     04-01    136  |  98  |  43<H>  ----------------------------<  145<H>  see note   |  31  |  1.36<H>    Ca    8.3<L>      01 Apr 2024 05:30  Phos  3.7     04-01  Mg     2.2     04-01    TPro  7.2  /  Alb  2.0<L>  /  TBili  4.7<H>  /  DBili  x   /  AST  see note  /  ALT  see note  /  AlkPhos  99  04-01      Urinalysis Basic - ( 01 Apr 2024 05:30 )    Color: x / Appearance: x / SG: x / pH: x  Gluc: 145 mg/dL / Ketone: x  / Bili: x / Urobili: x   Blood: x / Protein: x / Nitrite: x   Leuk Esterase: x / RBC: x / WBC x   Sq Epi: x / Non Sq Epi: x / Bacteria: x        RADIOLOGY & ADDITIONAL STUDIES:

## 2024-04-01 NOTE — PROGRESS NOTE ADULT - PROBLEM SELECTOR PLAN 2
3/30 pt complaining of feeling congested, requesting lasix. CXR with congestion and/or infiltrates. WBC uptrending 9->11->12. Per private nurse pt developed a productive cough as of 3/27 and his oxygen demands are new as of today. Meeting 1/4 SIRS criteria for WBC (off b-blocker for bleed). Denies fevers/chills.   S/p 20mg Lasix IVP x2; BIPAP. Found to be COVID positive, continuing with empiric CAP coverage given new presentation and elevated procal 0.42. Sputum cultures growing MSSA. ID consulted     Plan:   - S/p one dose Vanc 1250mg q12h and Cefepime 2g x3 doses(iso penicillin allergy)  - C/w COVID treatment  - started cefazolin 2000 mg q8

## 2024-04-01 NOTE — PROGRESS NOTE ADULT - PROBLEM SELECTOR PLAN 3
Patient with downtrending hgb, likely 2/2 oozing from stoma site. On admission, hgb 7.6 (had received 2u pRBC 2 days ago for hgb 5.9). Hematology consulted given ongoing anemia and the need for multiple blood transfusions, with c/f MDS.     Plan:   - F/u heme onc recs: MDS workup: SPEP, UPEP, serum free light chains, serum immunoglobulins   - F/up surgery recs blood oozing from stoma.   - transfuse to Hgb > 7  - maintain active T&S  - maintain two large bore IVs  - Wound care for ostomy hemostasis

## 2024-04-01 NOTE — PROGRESS NOTE ADULT - SUBJECTIVE AND OBJECTIVE BOX
77 yo man with a history of AFib/Flutter s/p DCCV, Crohn's, prior ileocectomy and open appendectomy, extensive abdominal surgeries and ileostomy presents with persistent anemia     He has had blood transfusions in the past however anemia is ongoing. History of blood loss in the ostomy. Currently with blood in the ostomy after he had an episode of coughing.   Bleeding is local issue here and is not like in DIC. Coags and platelets are normal.  Please send fibrinogen d-dimer pt ptt again. It is possible that fibrinogen will be low and that he would then require cryoprecipitate..  The medication gattex which was started around the same time as bleeding started was discontinued around two weeks ago and the bleeding from stoma persists. One of the effects of this drug is angiogenesis.  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 I am wondering if the bleeding is related to the med and of course the open wound.   Send labs as above.     Chest pain

## 2024-04-01 NOTE — DIETITIAN INITIAL EVALUATION ADULT - NSICDXPASTMEDICALHX_GEN_ALL_CORE_FT
PAST MEDICAL HISTORY:  Atrial fibrillation s/p cardioversion 2010 and 2014  Pt. reports 4 DCCV  Now on Amiodarone 200mg PO bid and Eliquis 5mg PO bid  Last DCCV 4 yrs ago at [facility]    Bradycardia     Crohn's disease s/p partial resection of ileum    Essential hypertension Hypertension    History of depression On Venlafaxine ER 150mg PO bid    Hyperlipidemia     Hypothyroidism     Junctional rhythm

## 2024-04-01 NOTE — DIETITIAN INITIAL EVALUATION ADULT - OTHER CALCULATIONS
Estimated nutritional needs determined using [facility] Standards of Nutrition Care for clinical judgement for ileostomy/fistula, SBS using current body weight 95.3 kg.

## 2024-04-01 NOTE — DIETITIAN INITIAL EVALUATION ADULT - PROBLEM SELECTOR PROBLEM 1
Anemia due to acute blood loss Referred To Asc For Closure Text (Leave Blank If You Do Not Want): After obtaining clear surgical margins the patient was sent to an ASC for surgical repair.  The patient understands they will receive post-surgical care and follow-up from the ASC physician.

## 2024-04-01 NOTE — PROGRESS NOTE ADULT - ASSESSMENT
77yo M with PMH of AFib/Flutter s/p DCCV, Crohn's, prior ileocectomy and open appendectomy, extensive abdominal surgeries and ileostomy, short-bowel syndrome, hypothyroidism, depression, recently seen at the Emergency Department for blood transfusion and multiple recent admissions for transfusions who presents as a direct admission for recurrent anemia. Found to be COVID+ now on remdesivir treatment.     Closely monitor for bleeding  Transfuse as needed for goal >7  Maintain active T&S  Continue to trend hgb  Care per primary team  Surgery Team 5c will continue to follow

## 2024-04-01 NOTE — PROGRESS NOTE ADULT - ASSESSMENT
Patient is a 79yo M with PMH of AFib/Flutter s/p DCCV, Crohn's, prior ileocectomy and open appendectomy, extensive abdominal surgeries and ileostomy, short-bowel syndrome, hypothyroidism, depression, recently seen at the Emergency Department for blood transfusion and multiple recent admissions for transfusions who presents as a direct admission for recurrent anemia. Course complicated by increasing 02 requirements and SOB, improved with IV Lasix stepped up to tele for further monitoring. Noted to be COVID positive, breathing comfortably with nasal cannula; remdesivir started. Surgery made aware of blood oozing from ostomy.

## 2024-04-01 NOTE — DIETITIAN INITIAL EVALUATION ADULT - PERTINENT LABORATORY DATA
04-01    133<L>  |  96  |  45<H>  ----------------------------<  225<H>  3.8   |  29  |  1.36<H>    Ca    8.1<L>      01 Apr 2024 10:00  Phos  3.7     04-01  Mg     2.2     04-01    TPro  7.7  /  Alb  2.1<L>  /  TBili  4.7<H>  /  DBili  x   /  AST  241<H>  /  ALT  166<H>  /  AlkPhos  100  04-01  A1C with Estimated Average Glucose Result: 5.1 % (09-11-23 @ 04:47)  A1C with Estimated Average Glucose Result: 4.8 % (06-27-23 @ 05:30)

## 2024-04-01 NOTE — PROGRESS NOTE ADULT - SUBJECTIVE AND OBJECTIVE BOX
no bleeding so far today  labs stable  AVSS  wound myles   ?gattax as cause of bleeding  has been stopped  start SARY

## 2024-04-01 NOTE — PROGRESS NOTE ADULT - SUBJECTIVE AND OBJECTIVE BOX
LARRY TREJO  78y  Male      Patient is a 78y old  Male who presents with a chief complaint of blood loss anemia (01 Apr 2024 12:37)      INTERVAL HPI/OVERNIGHT EVENTS: Overnight, given melatonin as noted difficulty sleeping. This morning, reports feeling well. Denies CP, dyspnea, SOB, N/V. Denies any upper respiratory symptoms.     Endorses continued bloody output from his ostomy. Dr. Trejo noted he learned bleeding could be secondary to gattax and the effects of neovascularization.     Vital Signs Last 24 Hrs  T(C): 36.4 (01 Apr 2024 17:25), Max: 36.9 (31 Mar 2024 23:59)  T(F): 97.5 (01 Apr 2024 17:25), Max: 98.5 (31 Mar 2024 23:59)  HR: 84 (01 Apr 2024 14:12) (80 - 216)  BP: 134/63 (01 Apr 2024 14:12) (110/51 - 142/65)  BP(mean): 91 (01 Apr 2024 14:12) (73 - 94)  RR: 25 (01 Apr 2024 14:12) (18 - 25)  SpO2: 94% (01 Apr 2024 14:12) (92% - 100%)    Parameters below as of 01 Apr 2024 14:12  Patient On (Oxygen Delivery Method): nasal cannula w/ humidification  O2 Flow (L/min): 2      PHYSICAL EXAM:  GENERAL: NAD, well-groomed, well-developed, OOBTC  HEAD:  Atraumatic, Normocephalic  EYES: EOMI, conjunctiva and sclera clear  ENMT: Moist mucous membranes, Good dentition  NERVOUS SYSTEM:  Alert & Oriented X3, Good concentration  CHEST/LUNG: Clear to auscultation bilaterally; No rales, rhonchi, wheezing, or rubs  HEART: Regular rate and rhythm; No murmurs, rubs, or gallops  ABDOMEN: Soft, Nontender, Nondistended; ostomy noted on left with bloody output visible, small clots oozing into ostomy bag  EXTREMITIES:  2+ Peripheral Pulses, No clubbing, cyanosis, or edema  LYMPH: No lymphadenopathy noted  SKIN: No rashes or lesions    Consultant(s) Notes Reviewed:  [x ] YES  [ ] NO  Care Discussed with Consultants/Other Providers [ x] YES  [ ] NO    LABS:                        8.1    14.15 )-----------( 205      ( 01 Apr 2024 05:30 )             25.0     04-01    133<L>  |  96  |  45<H>  ----------------------------<  225<H>  3.8   |  29  |  1.36<H>    Ca    8.1<L>      01 Apr 2024 10:00  Phos  3.7     04-01  Mg     2.2     04-01    TPro  7.7  /  Alb  2.1<L>  /  TBili  4.7<H>  /  DBili  x   /  AST  241<H>  /  ALT  166<H>  /  AlkPhos  100  04-01      Urinalysis Basic - ( 01 Apr 2024 10:00 )    Color: x / Appearance: x / SG: x / pH: x  Gluc: 225 mg/dL / Ketone: x  / Bili: x / Urobili: x   Blood: x / Protein: x / Nitrite: x   Leuk Esterase: x / RBC: x / WBC x   Sq Epi: x / Non Sq Epi: x / Bacteria: x      ABG - ( 31 Mar 2024 02:41 )  pH, Arterial: 7.51  pH, Blood: x     /  pCO2: 47    /  pO2: 90    / HCO3: 38    / Base Excess: 12.7  /  SaO2: 98.8              CAPILLARY BLOOD GLUCOSE      POCT Blood Glucose.: 173 mg/dL (01 Apr 2024 12:21)      RADIOLOGY & ADDITIONAL TESTS:    Imaging Personally Reviewed:  [ ] YES  [ ] NO

## 2024-04-01 NOTE — DIETITIAN INITIAL EVALUATION ADULT - NSFNSGIIOFT_GEN_A_CORE
03-31-24 @ 07:01 - 04-01-24 @ 07:00  --------------------------------------------------------  OUT:    Ileostomy (mL): 100 mL  Total OUT: 100 mL    Total NET: 800 mL      04-01-24 @ 07:01  -  04-01-24 @ 11:55  --------------------------------------------------------  OUT:    Ileostomy (mL): 20 mL  Total OUT: 20 mL    Total NET: -20 mL

## 2024-04-01 NOTE — DIETITIAN INITIAL EVALUATION ADULT - PROBLEM SELECTOR PLAN 1
Patient with Hgb 7.7 on outpatient labs, repeat here 7.6. Last discharged with Hgb 8.2 from the ED two days ago after receiving 2u pRBC. Likely due to continued oozing from stoma site. Hematology consulted given ongoing anemia and the need for multiple blood transfusions.   - f/u iron studies, vit B12, folate, retic count  - epogen 150U/kG SQ x 1  - transfuse to Hgb > 7  - maintain active T&S  - maintain two large bore IVs  - plan for iron infusion in the AM  - hematology Dr. Villatoro consulted, f/u recs

## 2024-04-01 NOTE — DIETITIAN INITIAL EVALUATION ADULT - PERTINENT MEDS FT
MEDICATIONS  (STANDING):  albuterol/ipratropium for Nebulization 3 milliLiter(s) Nebulizer every 6 hours  atorvastatin 20 milliGRAM(s) Oral at bedtime  cefTRIAXone   IVPB 1000 milliGRAM(s) IV Intermittent every 24 hours  cholestyramine Powder (Sugar-Free) 4 Gram(s) Oral every 24 hours  dextrose 5%. 1000 milliLiter(s) (100 mL/Hr) IV Continuous <Continuous>  dextrose 5%. 1000 milliLiter(s) (50 mL/Hr) IV Continuous <Continuous>  dextrose 50% Injectable 25 Gram(s) IV Push once  dextrose 50% Injectable 25 Gram(s) IV Push once  dextrose 50% Injectable 12.5 Gram(s) IV Push once  glucagon  Injectable 1 milliGRAM(s) IntraMuscular once  levothyroxine 50 MICROGram(s) Oral every 24 hours  lipid, fat emulsion (Fish Oil and Plant Based) 20% Infusion 0.5249 Gm/kG/Day (20.8 mL/Hr) IV Continuous <Continuous>  pancrelipase  (CREON 24,000 Lipase Units) 1 Capsule(s) Oral three times a day with meals  Parenteral Nutrition - Adult 1 Each TPN Continuous <Continuous>  remdesivir  IVPB   IV Intermittent   remdesivir  IVPB 100 milliGRAM(s) IV Intermittent every 24 hours  venlafaxine XR. 300 milliGRAM(s) Oral every 24 hours    MEDICATIONS  (PRN):  acetaminophen     Tablet .. 650 milliGRAM(s) Oral every 6 hours PRN Temp greater or equal to 38C (100.4F), Mild Pain (1 - 3)  dextrose Oral Gel 15 Gram(s) Oral once PRN Blood Glucose LESS THAN 70 milliGRAM(s)/deciliter  guaiFENesin Oral Liquid (Sugar-Free) 100 milliGRAM(s) Oral every 6 hours PRN Cough  LORazepam     Tablet 0.5 milliGRAM(s) Oral at bedtime PRN Insomnia  trimethobenzamide Injectable 200 milliGRAM(s) IntraMuscular every 6 hours PRN Nausea and/or Vomiting

## 2024-04-02 NOTE — PROGRESS NOTE ADULT - SUBJECTIVE AND OBJECTIVE BOX
SUBJECTIVE: Patient seen and examined bedside. Pt reports feeling well with no acute complaints. Reports that he has had no bleeding from his wound since yesterday. Denies nausea, vomiting, or respiratory symptoms 2/2 to COVID infection. Continues to tolerate diet.     ceFAZolin   IVPB 2000 milliGRAM(s) IV Intermittent every 8 hours  remdesivir  IVPB   IV Intermittent   remdesivir  IVPB 100 milliGRAM(s) IV Intermittent every 24 hours      Vital Signs Last 24 Hrs  T(C): 36.3 (02 Apr 2024 10:00), Max: 36.9 (02 Apr 2024 00:17)  T(F): 97.3 (02 Apr 2024 10:00), Max: 98.4 (02 Apr 2024 00:17)  HR: 95 (02 Apr 2024 08:31) (74 - 95)  BP: 131/58 (02 Apr 2024 08:31) (120/55 - 158/69)  BP(mean): 84 (02 Apr 2024 08:31) (79 - 99)  RR: 18 (02 Apr 2024 08:31) (18 - 28)  SpO2: 97% (02 Apr 2024 08:31) (90% - 97%)    Parameters below as of 02 Apr 2024 08:31  Patient On (Oxygen Delivery Method): room air      I&O's Detail    01 Apr 2024 07:01  -  02 Apr 2024 07:00  --------------------------------------------------------  IN:    Fat Emulsion (Fish Oil &amp; Plant Based) 20% Infusion: 210 mL    TPN (Total Parenteral Nutrition): 1380 mL  Total IN: 1590 mL    OUT:    Drain (mL): 1200 mL    Ileostomy (mL): 20 mL    Voided (mL): 1050 mL  Total OUT: 2270 mL    Total NET: -680 mL      02 Apr 2024 07:01  -  02 Apr 2024 11:18  --------------------------------------------------------  IN:  Total IN: 0 mL    OUT:    Drain (mL): 400 mL  Total OUT: 400 mL    Total NET: -400 mL          General: NAD, resting comfortably in bed  C/V: NSR  Pulm: Nonlabored breathing, no respiratory distress  Abd: soft, NT/ND, midline wound manager in place with healthy granulation tissue without bleeding, bilious output in bag  Extrem: WWP, no edema, SCDs in place        LABS:                        8.1    14.15 )-----------( 205      ( 01 Apr 2024 05:30 )             25.0     04-01    133<L>  |  96  |  45<H>  ----------------------------<  225<H>  3.8   |  29  |  1.36<H>    Ca    8.1<L>      01 Apr 2024 10:00  Phos  3.7     04-01  Mg     2.2     04-01    TPro  7.7  /  Alb  2.1<L>  /  TBili  4.7<H>  /  DBili  x   /  AST  241<H>  /  ALT  166<H>  /  AlkPhos  100  04-01      Urinalysis Basic - ( 01 Apr 2024 10:00 )    Color: x / Appearance: x / SG: x / pH: x  Gluc: 225 mg/dL / Ketone: x  / Bili: x / Urobili: x   Blood: x / Protein: x / Nitrite: x   Leuk Esterase: x / RBC: x / WBC x   Sq Epi: x / Non Sq Epi: x / Bacteria: x        RADIOLOGY & ADDITIONAL STUDIES:

## 2024-04-02 NOTE — PROGRESS NOTE ADULT - ASSESSMENT
79yo M with PMH of AFib/Flutter s/p DCCV, Crohn's, prior ileocectomy and open appendectomy, extensive abdominal surgeries and ileostomy, short-bowel syndrome, hypothyroidism, depression, recently seen at the Emergency Department for blood transfusion and multiple recent admissions for transfusions who presents as a direct admission for recurrent anemia. Found to be COVID+ now on remdesivir treatment.     Closely monitor for bleeding  Consult Dr. Zhao (Interventional cardiology) for evaluation of possible AVM within wound bed for possible intervention  Transfuse as needed for goal >7  Continue Ottoniel  Maintain active T&S  Continue to trend hgb  Care per primary team  Surgery Team 5c will continue to follow

## 2024-04-02 NOTE — PROGRESS NOTE ADULT - ASSESSMENT
Patient is a 77yo M with PMH of AFib/Flutter s/p DCCV, Crohn's, prior ileocectomy and open appendectomy, extensive abdominal surgeries and ileostomy, short-bowel syndrome, hypothyroidism, depression, recently seen at the Emergency Department for blood transfusion and multiple recent admissions for transfusions who presents as a direct admission for recurrent anemia. Course complicated by increasing 02 requirements and SOB, improved with IV Lasix stepped up to tele for further monitoring. Noted to be COVID positive, breathing comfortably with nasal cannula; remdesivir started. Surgery made aware of blood oozing from ostomy.

## 2024-04-02 NOTE — PROGRESS NOTE ADULT - PROBLEM SELECTOR PLAN 3
Patient with downtrending hgb, likely 2/2 oozing from stoma site. On admission, hgb 7.6 (had received 2u pRBC 2 days ago for hgb 5.9). Hematology consulted given ongoing anemia and the need for multiple blood transfusions, with c/f MDS.     Plan:   - F/up surgery recs blood oozing from stoma.   - Vascular surgery considering intervention pending completion of COVID tx   - transfuse to Hgb > 7  - maintain active T&S  - maintain two large bore IVs  - Wound care for ostomy hemostasis

## 2024-04-02 NOTE — PROGRESS NOTE ADULT - SUBJECTIVE AND OBJECTIVE BOX
LARRY KELLY  78y  Male      Patient is a 78y old  Male who presents with a chief complaint of blood loss anemia (02 Apr 2024 11:18)      INTERVAL HPI/OVERNIGHT EVENTS: Overnight patient has 8 beats of NSVT.  Today patient seen and examined at bedside. Reports improvement in breathing, denies CP, SOB, N/V/D/C and abdominal pain. Private RN at bedside and noted the output from the ostomy to brown stool, not very much blood output noted. Vascular surgery considering further intervention pending resolution of COVID tx.     Vital Signs Last 24 Hrs  T(C): 36.3 (02 Apr 2024 14:00), Max: 36.9 (02 Apr 2024 00:17)  T(F): 97.3 (02 Apr 2024 14:00), Max: 98.4 (02 Apr 2024 00:17)  HR: 90 (02 Apr 2024 12:12) (74 - 95)  BP: 128/68 (02 Apr 2024 12:12) (120/55 - 158/69)  BP(mean): 88 (02 Apr 2024 12:12) (79 - 99)  RR: 27 (02 Apr 2024 12:12) (20 - 28)  SpO2: 94% (02 Apr 2024 12:12) (90% - 97%)    Parameters below as of 02 Apr 2024 12:12  Patient On (Oxygen Delivery Method): room air        PHYSICAL EXAM:  GENERAL: NAD, well-groomed, well-developed  HEAD:  Atraumatic, Normocephalic  EYES: EOMI, PERRLA, conjunctiva and sclera clear  ENMT: Moist mucous membranes, Good dentition, No lesions  NECK: Supple, No JVD, Normal thyroid  NERVOUS SYSTEM:  Alert & Oriented X3, Good concentration; Motor Strength 5/5 B/L upper and lower extremities; DTRs 2+ intact and symmetric  CHEST/LUNG: Clear to auscultation bilaterally; No rales, rhonchi, wheezing, or rubs  HEART: Regular rate and rhythm; No murmurs, rubs, or gallops  ABDOMEN: Soft, Nontender, Nondistended; Bowel sounds present  EXTREMITIES:  2+ Peripheral Pulses, No clubbing, cyanosis, or edema  LYMPH: No lymphadenopathy noted  SKIN: No rashes or lesions    Consultant(s) Notes Reviewed:  [x ] YES  [ ] NO  Care Discussed with Consultants/Other Providers [ x] YES  [ ] NO    LABS:                        8.4    14.43 )-----------( 214      ( 02 Apr 2024 12:16 )             25.9     04-02    136  |  98  |  44<H>  ----------------------------<  156<H>  SEE NOTE   |  32<H>  |  1.22    Ca    7.8<L>      02 Apr 2024 12:16  Phos  3.5     04-02  Mg     2.3     04-02    TPro  7.7  /  Alb  1.9<L>  /  TBili  4.4<H>  /  DBili  x   /  AST  SEE NOTE  /  ALT  SEE NOTE  /  AlkPhos  95  04-02      Urinalysis Basic - ( 02 Apr 2024 12:16 )    Color: x / Appearance: x / SG: x / pH: x  Gluc: 156 mg/dL / Ketone: x  / Bili: x / Urobili: x   Blood: x / Protein: x / Nitrite: x   Leuk Esterase: x / RBC: x / WBC x   Sq Epi: x / Non Sq Epi: x / Bacteria: x        CAPILLARY BLOOD GLUCOSE      POCT Blood Glucose.: 158 mg/dL (02 Apr 2024 12:03)  POCT Blood Glucose.: 91 mg/dL (02 Apr 2024 07:17)  POCT Blood Glucose.: 165 mg/dL (02 Apr 2024 00:10)      RADIOLOGY & ADDITIONAL TESTS:    Imaging Personally Reviewed:  [ ] YES  [ ] NO

## 2024-04-02 NOTE — PROGRESS NOTE ADULT - PROBLEM SELECTOR PLAN 1
Found to be COVID-19 positive; Mild increase in WBC 12.18 on 3/30 , afebrile, endorses white sputum production. Overnight. patient reported SOB, CXR 3/30/23 concerning for congestion, s/p 3 U pRBC and subsequent dose of lasix 20 mg IV on 3/30/24. Given concern that patient flashed, given lasix which improved symptoms. However, given initial iWOB started on remesidivir and decadron. Patient initially on BIPAP overnight given SOB --> weaned to NC.     Plan:  - c/w weaning O2 as tolerated   - c/w remdesivir 100 mg QD (Day 3/5)  - c/w decadron 6 mg IV QD (Day 3/5)

## 2024-04-02 NOTE — PROGRESS NOTE ADULT - SUBJECTIVE AND OBJECTIVE BOX
79 yo man with a history of AFib/Flutter s/p DCCV, Crohn's, prior ileocectomy and open appendectomy, extensive abdominal surgeries and ileostomy presents with persistent anemia     He has had blood transfusions in the past however anemia is ongoing. History of blood loss in the ostomy. Currently with blood in the ostomy after he had an episode of coughing.   Bleeding is local issue here and is not like in DIC. Coags and platelets are normal.  Please send fibrinogen d-dimer pt ptt again. It is possible that fibrinogen will be low and that he would then require cryoprecipitate.  He bled after coughing again today. These areas were cauterized.  Labs which I requested are being sent, awaiting results. Please call me to discuss [phone]. If coags same as prior,ie, mild elevation of INR would consider fresh frozen plasma. If fibrinogen low cryoprecipitate.

## 2024-04-03 NOTE — PROGRESS NOTE ADULT - ASSESSMENT
Patient is a 79yo M with PMH of AFib/Flutter s/p DCCV, Crohn's, prior ileocectomy and open appendectomy, extensive abdominal surgeries and ileostomy, short-bowel syndrome, hypothyroidism, depression, recently seen at the Emergency Department for blood transfusion and multiple recent admissions for transfusions who presents as a direct admission for recurrent anemia. Course complicated by increasing 02 requirements and SOB, improved with IV Lasix stepped up to tele for further monitoring. Noted to be COVID positive, breathing comfortably with nasal cannula; remdesivir started. Surgery made aware of blood oozing from ostomy.  Patient is a 77yo M with PMH of AFib/Flutter s/p DCCV, Crohn's, prior ileocectomy and open appendectomy, extensive abdominal surgeries and ileostomy, short-bowel syndrome, hypothyroidism, depression, recently seen at the Emergency Department for blood transfusion and multiple recent admissions for transfusions who presents as a direct admission for recurrent anemia. Course complicated by increasing 02 requirements and SOB, improved with IV Lasix stepped up to tele for further monitoring. Noted to be COVID positive, breathing comfortably with nasal cannula; remdesivir started. Surgery made aware of blood oozing from ostomy, now improved, with no notable blood in ostomy output.

## 2024-04-03 NOTE — PROGRESS NOTE ADULT - SUBJECTIVE AND OBJECTIVE BOX
INTERVAL HPI/OVERNIGHT EVENTS:    SUBJECTIVE: Patient seen and examined at bedside.    OBJECTIVE:    VITAL SIGNS:  ICU Vital Signs Last 24 Hrs  T(C): 36.4 (03 Apr 2024 05:49), Max: 36.6 (02 Apr 2024 20:40)  T(F): 97.6 (03 Apr 2024 05:49), Max: 97.9 (02 Apr 2024 20:40)  HR: 107 (03 Apr 2024 05:26) (90 - 146)  BP: 124/60 (03 Apr 2024 04:30) (120/65 - 145/91)  BP(mean): 86 (03 Apr 2024 04:30) (84 - 110)  ABP: --  ABP(mean): --  RR: 21 (03 Apr 2024 04:30) (20 - 27)  SpO2: 97% (03 Apr 2024 05:26) (91% - 98%)    O2 Parameters below as of 03 Apr 2024 05:26  Patient On (Oxygen Delivery Method): BiPAP/CPAP    O2 Concentration (%): 40          04-02 @ 07:01  -  04-03 @ 07:00  --------------------------------------------------------  IN: 0 mL / OUT: 1425 mL / NET: -1425 mL      CAPILLARY BLOOD GLUCOSE      POCT Blood Glucose.: 110 mg/dL (03 Apr 2024 06:39)      PHYSICAL EXAM:    General: NAD  HEENT: anicteric sclera, MMM  Neck: supple, no JVD  Respiratory: CTA B/L; no W/R/R  Cardiovascular: +S1/S2; RRR; no M/R/G  Gastrointestinal: soft, NT/ND; +BS x4  Extremities: WWP; 2+ peripheral pulses B/L; no LE edema  Skin: normal color and turgor; no rash  Neurological: A&Ox    MEDICATIONS:  MEDICATIONS  (STANDING):  albuterol/ipratropium for Nebulization 3 milliLiter(s) Nebulizer every 6 hours  atorvastatin 20 milliGRAM(s) Oral at bedtime  ceFAZolin   IVPB 2000 milliGRAM(s) IV Intermittent every 8 hours  cholestyramine Powder (Sugar-Free) 4 Gram(s) Oral every 24 hours  dexAMETHasone  Injectable 6 milliGRAM(s) IV Push every 24 hours  dextrose 5%. 1000 milliLiter(s) (50 mL/Hr) IV Continuous <Continuous>  dextrose 5%. 1000 milliLiter(s) (100 mL/Hr) IV Continuous <Continuous>  dextrose 50% Injectable 25 Gram(s) IV Push once  dextrose 50% Injectable 25 Gram(s) IV Push once  dextrose 50% Injectable 12.5 Gram(s) IV Push once  glucagon  Injectable 1 milliGRAM(s) IntraMuscular once  levothyroxine 50 MICROGram(s) Oral every 24 hours  lipid, fat emulsion (Fish Oil and Plant Based) 20% Infusion 0.5249 Gm/kG/Day (20.8 mL/Hr) IV Continuous <Continuous>  melatonin 5 milliGRAM(s) Oral at bedtime  metoprolol tartrate 25 milliGRAM(s) Oral every 12 hours  pancrelipase  (CREON 24,000 Lipase Units) 1 Capsule(s) Oral three times a day with meals  Parenteral Nutrition - Adult 1 Each TPN Continuous <Continuous>  remdesivir  IVPB   IV Intermittent   remdesivir  IVPB 100 milliGRAM(s) IV Intermittent every 24 hours  venlafaxine XR. 300 milliGRAM(s) Oral every 24 hours    MEDICATIONS  (PRN):  acetaminophen     Tablet .. 650 milliGRAM(s) Oral every 6 hours PRN Temp greater or equal to 38C (100.4F), Mild Pain (1 - 3)  benzonatate 100 milliGRAM(s) Oral three times a day PRN Cough  dextrose Oral Gel 15 Gram(s) Oral once PRN Blood Glucose LESS THAN 70 milliGRAM(s)/deciliter  guaiFENesin Oral Liquid (Sugar-Free) 100 milliGRAM(s) Oral every 6 hours PRN Cough  LORazepam     Tablet 0.5 milliGRAM(s) Oral at bedtime PRN Insomnia  trimethobenzamide Injectable 200 milliGRAM(s) IntraMuscular every 6 hours PRN Nausea and/or Vomiting      ALLERGIES:  Allergies    penicillin (Angioedema)    Intolerances        LABS:                        7.9    14.39 )-----------( 200      ( 03 Apr 2024 05:30 )             25.5     04-03    139  |  100  |  43<H>  ----------------------------<  125<H>  4.6   |  30  |  1.32<H>    Ca    7.7<L>      03 Apr 2024 05:30  Phos  3.4     04-03  Mg     2.4     04-03    TPro  7.3  /  Alb  2.0<L>  /  TBili  4.3<H>  /  DBili  x   /  AST  189<H>  /  ALT  120<H>  /  AlkPhos  102  04-03    LIVER FUNCTIONS - ( 03 Apr 2024 05:30 )  Alb: 2.0 g/dL / Pro: 7.3 g/dL / ALK PHOS: 102 U/L / ALT: 120 U/L / AST: 189 U/L / GGT: x           PT/INR - ( 03 Apr 2024 05:30 )   PT: 15.8 sec;   INR: 1.40          PTT - ( 03 Apr 2024 05:30 )  PTT:31.9 sec  Urinalysis Basic - ( 03 Apr 2024 05:30 )    Color: x / Appearance: x / SG: x / pH: x  Gluc: 125 mg/dL / Ketone: x  / Bili: x / Urobili: x   Blood: x / Protein: x / Nitrite: x   Leuk Esterase: x / RBC: x / WBC x   Sq Epi: x / Non Sq Epi: x / Bacteria: x              RADIOLOGY & ADDITIONAL TESTS: Reviewed.    ASSESSMENT:    PLAN:     NEURO   #     RESP   #     CARDIAC   #Shock 2/2   Volume:   Perfusion:   Hemodynamic:      GI   #     RENAL   #     ENDO   #     ID   #     HEME/ONC   #     PREVENTATIVE   F:   E: replete as needed, maintain K>4, Mg>2  N:   DVT PPx:    GI PPx:   Lines/Tubes:   Code:   Dispo: MICU INTERVAL HPI/OVERNIGHT EVENTS: 1x ativan, bipap ovn    SUBJECTIVE: Patient seen and examined at bedside. NAD, denies abd pain, fever, chills, chest pain, sob.    OBJECTIVE:    VITAL SIGNS:  ICU Vital Signs Last 24 Hrs  T(C): 36.4 (03 Apr 2024 05:49), Max: 36.6 (02 Apr 2024 20:40)  T(F): 97.6 (03 Apr 2024 05:49), Max: 97.9 (02 Apr 2024 20:40)  HR: 107 (03 Apr 2024 05:26) (90 - 146)  BP: 124/60 (03 Apr 2024 04:30) (120/65 - 145/91)  BP(mean): 86 (03 Apr 2024 04:30) (84 - 110)  ABP: --  ABP(mean): --  RR: 21 (03 Apr 2024 04:30) (20 - 27)  SpO2: 97% (03 Apr 2024 05:26) (91% - 98%)    O2 Parameters below as of 03 Apr 2024 05:26  Patient On (Oxygen Delivery Method): BiPAP/CPAP    O2 Concentration (%): 40          04-02 @ 07:01  -  04-03 @ 07:00  --------------------------------------------------------  IN: 0 mL / OUT: 1425 mL / NET: -1425 mL      CAPILLARY BLOOD GLUCOSE      POCT Blood Glucose.: 110 mg/dL (03 Apr 2024 06:39)      PHYSICAL EXAM:    General: NAD  HEENT: anicteric sclera, MMM  Neck: supple, no JVD  Respiratory: CTA B/L; no W/R/R  Cardiovascular: +S1/S2; RRR; no M/R/G  Gastrointestinal: soft, NT/ND; +BS x4  Extremities: WWP; 2+ peripheral pulses B/L; no LE edema  Skin: normal color and turgor; no rash  Neurological: A&Ox    MEDICATIONS:  MEDICATIONS  (STANDING):  albuterol/ipratropium for Nebulization 3 milliLiter(s) Nebulizer every 6 hours  atorvastatin 20 milliGRAM(s) Oral at bedtime  ceFAZolin   IVPB 2000 milliGRAM(s) IV Intermittent every 8 hours  cholestyramine Powder (Sugar-Free) 4 Gram(s) Oral every 24 hours  dexAMETHasone  Injectable 6 milliGRAM(s) IV Push every 24 hours  dextrose 5%. 1000 milliLiter(s) (50 mL/Hr) IV Continuous <Continuous>  dextrose 5%. 1000 milliLiter(s) (100 mL/Hr) IV Continuous <Continuous>  dextrose 50% Injectable 25 Gram(s) IV Push once  dextrose 50% Injectable 25 Gram(s) IV Push once  dextrose 50% Injectable 12.5 Gram(s) IV Push once  glucagon  Injectable 1 milliGRAM(s) IntraMuscular once  levothyroxine 50 MICROGram(s) Oral every 24 hours  lipid, fat emulsion (Fish Oil and Plant Based) 20% Infusion 0.5249 Gm/kG/Day (20.8 mL/Hr) IV Continuous <Continuous>  melatonin 5 milliGRAM(s) Oral at bedtime  metoprolol tartrate 25 milliGRAM(s) Oral every 12 hours  pancrelipase  (CREON 24,000 Lipase Units) 1 Capsule(s) Oral three times a day with meals  Parenteral Nutrition - Adult 1 Each TPN Continuous <Continuous>  remdesivir  IVPB   IV Intermittent   remdesivir  IVPB 100 milliGRAM(s) IV Intermittent every 24 hours  venlafaxine XR. 300 milliGRAM(s) Oral every 24 hours    MEDICATIONS  (PRN):  acetaminophen     Tablet .. 650 milliGRAM(s) Oral every 6 hours PRN Temp greater or equal to 38C (100.4F), Mild Pain (1 - 3)  benzonatate 100 milliGRAM(s) Oral three times a day PRN Cough  dextrose Oral Gel 15 Gram(s) Oral once PRN Blood Glucose LESS THAN 70 milliGRAM(s)/deciliter  guaiFENesin Oral Liquid (Sugar-Free) 100 milliGRAM(s) Oral every 6 hours PRN Cough  LORazepam     Tablet 0.5 milliGRAM(s) Oral at bedtime PRN Insomnia  trimethobenzamide Injectable 200 milliGRAM(s) IntraMuscular every 6 hours PRN Nausea and/or Vomiting      ALLERGIES:  Allergies    penicillin (Angioedema)    Intolerances        LABS:                        7.9    14.39 )-----------( 200      ( 03 Apr 2024 05:30 )             25.5     04-03    139  |  100  |  43<H>  ----------------------------<  125<H>  4.6   |  30  |  1.32<H>    Ca    7.7<L>      03 Apr 2024 05:30  Phos  3.4     04-03  Mg     2.4     04-03    TPro  7.3  /  Alb  2.0<L>  /  TBili  4.3<H>  /  DBili  x   /  AST  189<H>  /  ALT  120<H>  /  AlkPhos  102  04-03    LIVER FUNCTIONS - ( 03 Apr 2024 05:30 )  Alb: 2.0 g/dL / Pro: 7.3 g/dL / ALK PHOS: 102 U/L / ALT: 120 U/L / AST: 189 U/L / GGT: x           PT/INR - ( 03 Apr 2024 05:30 )   PT: 15.8 sec;   INR: 1.40          PTT - ( 03 Apr 2024 05:30 )  PTT:31.9 sec  Urinalysis Basic - ( 03 Apr 2024 05:30 )    Color: x / Appearance: x / SG: x / pH: x  Gluc: 125 mg/dL / Ketone: x  / Bili: x / Urobili: x   Blood: x / Protein: x / Nitrite: x   Leuk Esterase: x / RBC: x / WBC x   Sq Epi: x / Non Sq Epi: x / Bacteria: x              RADIOLOGY & ADDITIONAL TESTS: Reviewed.    ASSESSMENT:    PLAN:     NEURO   #     RESP   #     CARDIAC   #Shock 2/2   Volume:   Perfusion:   Hemodynamic:      GI   #     RENAL   #     ENDO   #     ID   #     HEME/ONC   #     PREVENTATIVE   F:   E: replete as needed, maintain K>4, Mg>2  N:   DVT PPx:    GI PPx:   Lines/Tubes:   Code:   Dispo: MICU

## 2024-04-03 NOTE — PROGRESS NOTE ADULT - SUBJECTIVE AND OBJECTIVE BOX
79 yo man with a history of AFib/Flutter s/p DCCV, Crohn's, prior ileocectomy and open appendectomy, extensive abdominal surgeries and ileostomy presents with persistent anemia     He has had blood transfusions in the past however anemia is ongoing. History of blood loss in the ostomy. Currently with blood in the ostomy after he had an episode of coughing.   Bleeding is local issue here and is not like in DIC. Coags and platelets are normal.  Please send fibrinogen d-dimer pt ptt again. It is possible that fibrinogen will be low and that he would then require cryoprecipitate.  He bled after coughing again yesterday. These areas were cauterized.  Today there is no teetee bleeding. He is on bipap however. Coags being resent today.   Labs which I requested are being sent, awaiting results. Please call me to discuss [phone]. If coags same as prior,ie, mild elevation of INR would consider fresh frozen plasma. If fibrinogen low cryoprecipitate.

## 2024-04-03 NOTE — PROGRESS NOTE ADULT - SUBJECTIVE AND OBJECTIVE BOX
SUBJECTIVE: Patient seen and examined bedside. Pt reports feeling well with no acute complaints. Denies any bleeding overnight from wound. Feels well without respiratory symptoms.     ceFAZolin   IVPB 2000 milliGRAM(s) IV Intermittent every 8 hours  metoprolol tartrate 25 milliGRAM(s) Oral every 12 hours  remdesivir  IVPB   IV Intermittent   remdesivir  IVPB 100 milliGRAM(s) IV Intermittent every 24 hours      Vital Signs Last 24 Hrs  T(C): 36 (03 Apr 2024 10:40), Max: 36.6 (02 Apr 2024 20:40)  T(F): 96.8 (03 Apr 2024 10:40), Max: 97.9 (02 Apr 2024 20:40)  HR: 108 (03 Apr 2024 10:10) (90 - 146)  BP: 133/69 (03 Apr 2024 10:10) (119/61 - 145/91)  BP(mean): 94 (03 Apr 2024 10:10) (85 - 110)  RR: 20 (03 Apr 2024 10:10) (20 - 27)  SpO2: 94% (03 Apr 2024 10:10) (91% - 98%)    Parameters below as of 03 Apr 2024 08:40  Patient On (Oxygen Delivery Method): room air      I&O's Detail    02 Apr 2024 07:01  -  03 Apr 2024 07:00  --------------------------------------------------------  IN:  Total IN: 0 mL    OUT:    Drain (mL): 1275 mL    Voided (mL): 400 mL  Total OUT: 1675 mL    Total NET: -1675 mL      03 Apr 2024 07:01  -  03 Apr 2024 11:13  --------------------------------------------------------  IN:  Total IN: 0 mL    OUT:    Drain (mL): 250 mL    Voided (mL): 150 mL  Total OUT: 400 mL    Total NET: -400 mL        General: NAD, resting comfortably in bed  C/V: NSR  Pulm: Nonlabored breathing, no respiratory distress  Abd: soft, NT/ND, midline wound manager in place with healthy granulation tissue without bleeding, bilious output in bag  Extrem: WWP, no edema, SCDs in         LABS:                        7.9    14.39 )-----------( 200      ( 03 Apr 2024 05:30 )             25.5     04-03    139  |  100  |  43<H>  ----------------------------<  125<H>  4.6   |  30  |  1.32<H>    Ca    7.7<L>      03 Apr 2024 05:30  Phos  3.4     04-03  Mg     2.4     04-03    TPro  7.3  /  Alb  2.0<L>  /  TBili  4.3<H>  /  DBili  x   /  AST  189<H>  /  ALT  120<H>  /  AlkPhos  102  04-03    PT/INR - ( 03 Apr 2024 05:30 )   PT: 15.8 sec;   INR: 1.40          PTT - ( 03 Apr 2024 05:30 )  PTT:31.9 sec  Urinalysis Basic - ( 03 Apr 2024 05:30 )    Color: x / Appearance: x / SG: x / pH: x  Gluc: 125 mg/dL / Ketone: x  / Bili: x / Urobili: x   Blood: x / Protein: x / Nitrite: x   Leuk Esterase: x / RBC: x / WBC x   Sq Epi: x / Non Sq Epi: x / Bacteria: x        RADIOLOGY & ADDITIONAL STUDIES:

## 2024-04-03 NOTE — PROGRESS NOTE ADULT - ASSESSMENT
77yo M with PMH of AFib/Flutter s/p DCCV, Crohn's, prior ileocectomy and open appendectomy, extensive abdominal surgeries and ileostomy, short-bowel syndrome, hypothyroidism, depression, recently seen at the Emergency Department for blood transfusion and multiple recent admissions for transfusions who presents as a direct admission for recurrent anemia. Found to be COVID+ now on remdesivir treatment.     Closely monitor for bleeding  F/u Dr. Zhao consult  Transfuse as needed for goal >7  Continue Ottoniel  Maintain active T&S  Continue to trend hgb  Care per primary team  Surgery Team 5c will continue to follow

## 2024-04-04 NOTE — DISCHARGE NOTE NURSING/CASE MANAGEMENT/SOCIAL WORK - NSDCPEPTCAREGIVEDUMATLIST _GEN_ALL_CORE
Influenza Vaccination/Coronavirus/COVID19 Influenza Vaccination/Enoxaparin/Lovenox/Coronavirus/COVID19

## 2024-04-04 NOTE — DISCHARGE NOTE NURSING/CASE MANAGEMENT/SOCIAL WORK - NSSCNAMETXT_GEN_ALL_CORE
[facility] (formerly [facility]) ; [facility] ([facility] [place]

## 2024-04-04 NOTE — DISCHARGE NOTE NURSING/CASE MANAGEMENT/SOCIAL WORK - NSDCPEFALRISK_GEN_ALL_CORE
For information on Fall & Injury Prevention, visit: [email or web address] OR  [email or web address] OR  https://www.cdc.gov/steadi/patient.html

## 2024-04-04 NOTE — DISCHARGE NOTE NURSING/CASE MANAGEMENT/SOCIAL WORK - PATIENT PORTAL LINK FT
You can access the FollowMyHealth Patient Portal offered by [facility] by registering at the following website: [email or web address]. By joining [facility]’s FollowMyHealth portal, you will also be able to view your health information using other applications (apps) compatible with our system.

## 2024-04-04 NOTE — DISCHARGE NOTE NURSING/CASE MANAGEMENT/SOCIAL WORK - NSSCCONTNUM_GEN_ALL_CORE
Phone: [phone]  ;   Phone: [phone]  #s to call once you arrived home regarding home care visits

## 2024-04-04 NOTE — PROGRESS NOTE ADULT - SUBJECTIVE AND OBJECTIVE BOX
H&H  responded to [facility]  heme labs back await Heme recs  wound Ok  LFTs normalizing    TPN   woundcare

## 2024-04-04 NOTE — PROGRESS NOTE ADULT - SUBJECTIVE AND OBJECTIVE BOX
SUBJECTIVE: Patient seen and examined bedside. Pt reports feeling well this morning with no acute complaints. Denies respiratory symptoms. Tolerating diet and ambulating. Minimal raw surface bleeding from wound bed easily controlled with manual pressure. Looking forward to returning home.    ceFAZolin   IVPB 2000 milliGRAM(s) IV Intermittent every 8 hours  metoprolol tartrate 25 milliGRAM(s) Oral every 12 hours      Vital Signs Last 24 Hrs  T(C): 36.2 (04 Apr 2024 06:51), Max: 36.8 (03 Apr 2024 16:41)  T(F): 97.2 (04 Apr 2024 06:51), Max: 98.3 (03 Apr 2024 16:41)  HR: 70 (04 Apr 2024 04:13) (69 - 108)  BP: 137/62 (04 Apr 2024 04:13) (119/61 - 148/68)  BP(mean): 89 (04 Apr 2024 04:13) (83 - 94)  RR: 18 (04 Apr 2024 04:13) (18 - 21)  SpO2: 92% (04 Apr 2024 04:13) (91% - 100%)    Parameters below as of 04 Apr 2024 04:13  Patient On (Oxygen Delivery Method): BiPAP/CPAP    O2 Concentration (%): 40  I&O's Detail    03 Apr 2024 07:01  -  04 Apr 2024 07:00  --------------------------------------------------------  IN:    Fat Emulsion (Fish Oil &amp; Plant Based) 20% Infusion: 62.4 mL    IV PiggyBack: 100 mL    TPN (Total Parenteral Nutrition): 365 mL  Total IN: 527.4 mL    OUT:    Drain (mL): 750 mL    Ileostomy (mL): 50 mL    Voided (mL): 350 mL  Total OUT: 1150 mL    Total NET: -622.6 mL          General: NAD, resting comfortably in bed  C/V: NSR  Pulm: Nonlabored breathing, no respiratory distress  Abd: soft, NT/ND, midline wound manager in place with healthy granulation tissue without bleeding, bilious output in bag  Extrem: WWP, no edema, SCDs in         LABS:                        9.4    18.86 )-----------( 218      ( 04 Apr 2024 05:30 )             29.7     04-04    137  |  101  |  47<H>  ----------------------------<  139<H>  4.9   |  30  |  1.26    Ca    8.0<L>      04 Apr 2024 05:30  Phos  3.9     04-04  Mg     2.5     04-04    TPro  7.1  /  Alb  2.0<L>  /  TBili  4.7<H>  /  DBili  x   /  AST  180<H>  /  ALT  96<H>  /  AlkPhos  110  04-04    PT/INR - ( 04 Apr 2024 05:30 )   PT: 14.5 sec;   INR: 1.28          PTT - ( 04 Apr 2024 05:30 )  PTT:30.5 sec  Urinalysis Basic - ( 04 Apr 2024 05:30 )    Color: x / Appearance: x / SG: x / pH: x  Gluc: 139 mg/dL / Ketone: x  / Bili: x / Urobili: x   Blood: x / Protein: x / Nitrite: x   Leuk Esterase: x / RBC: x / WBC x   Sq Epi: x / Non Sq Epi: x / Bacteria: x        RADIOLOGY & ADDITIONAL STUDIES:

## 2024-04-04 NOTE — CONSULT NOTE ADULT - ASSESSMENT
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 Severely dilated right atrium. Pulmonary hypertension present, pulmonary artery systolic pressure is 42 mmHg. Left pleural effusion.   TTE 1/04/2024:  Hyperdynamic left ventricular systolic function, ,moderate symmetric left ventricular hypertrophy, Grade II DD, normal RV function, normal atria, PASP 40mmHg, no pericardial effusion   Tele: now in NSR, had afib with RVR, night 3/4 had a 30 beats of NSVT.     #NSVT/afib   Patient with Hx of AFib/Flutter s/p multiple DCCV with 20 sec sinus pause during prior Hospitalization s/p Micra AV implant (02/2024), now with  NSVT in setting of acute blood loss anemia.   BB was held on admission and was restarted only at half dose. He is on Lopressor 50 mg po BID at home  Clinically patient is warm, well perfused, but does have decreased breath sounds bilaterally and trace peripheral edema. He has been saturating between 91-94 % on RA  c/w Lopressor 50 mg po BID  Keep K>4.5 and Mag>2  To optimize patient's volume status, recommend lasix 40 IVP x1 today   CHADVASC of at least 3, however patient unable to tolerate AC at this time. Will continue to defer for now.   EP to interrogate micra tomorrow

## 2024-04-04 NOTE — PROGRESS NOTE ADULT - ASSESSMENT
Patient is a 79yo M with PMH of AFib/Flutter s/p DCCV, Crohn's, prior ileocectomy and open appendectomy, extensive abdominal surgeries and ileostomy, short-bowel syndrome, hypothyroidism, depression, recently seen at the Emergency Department for blood transfusion and multiple recent admissions for transfusions who presents as a direct admission for recurrent anemia. Course complicated by increasing 02 requirements and SOB, improved with IV Lasix stepped up to tele for further monitoring. Noted to be COVID positive, breathing comfortably with nasal cannula; remdesivir started. Surgery made aware of blood oozing from ostomy, now improved, with no notable blood in ostomy output. Patient is a 79yo M with PMH of AFib/Flutter s/p DCCV, Crohn's, prior ileocectomy and open appendectomy, extensive abdominal surgeries and ileostomy, short-bowel syndrome, hypothyroidism, depression, recently seen at the Emergency Department for blood transfusion and multiple recent admissions for transfusions who presents as a direct admission for recurrent anemia. Course complicated by increasing 02 requirements and SOB, improved with IV Lasix stepped up to tele for further monitoring. Noted to be COVID positive, breathing comfortably with nasal cannula; remdesivir started. intermittently bleeding from ostomy

## 2024-04-04 NOTE — CONSULT NOTE ADULT - ATTENDING COMMENTS
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 Severely dilated right atrium. Pulmonary hypertension present, pulmonary artery systolic pressure is 42 mmHg. Left pleural effusion.  - TTE 1/04/2024:  Hyperdynamic left ventricular systolic function, ,moderate symmetric left ventricular hypertrophy, Grade II DD, normal RV function, normal atria, PASP 40mmHg, no pericardial effusion     #Tachyarrhythmias  - Patient with Hx of AFib/Flutter s/p multiple DCCV with 20 sec sinus pause during prior Hospitalization s/p Micra AV implant (02/2024), now with  NSVT in setting of acute blood loss anemia (now improved), Hypoxic Respiratory Failure from Covid infection, clinically improved  - Med list reviewed. Patient's BB  in setting of blood loss and resumed  at half dose in setting of Afib with RVR. Patient' home regimen include Lopressor 50 mg po BID   - Clinically patient is warm, well perfused with Decrease BS bilaterally and trace peripheral edema. He has been saturating between 91-94 % on RA  - Echo this hospitalization reviewed showing normal BIV function. Patient has known GIIDD  - Would recommend continuing with patient's home dose of Lopressor 50 mg po BID with goal to uptitrate as tolerated/needed  - Please ensure K>4.5 and Mag>2  - To further optimize respiratory status as Hypoxia a  of arrhythmias, would diurese today with lasix 40 IVP x1  - CHADVASC of at least 3, however patient unable to tolerate AC at this time. Will continue to defer for now.   - Cardiology will follow with you, please call with any questions .

## 2024-04-04 NOTE — PROGRESS NOTE ADULT - SUBJECTIVE AND OBJECTIVE BOX
79 yo man with a history of AFib/Flutter s/p DCCV, Crohn's, prior ileocectomy and open appendectomy, extensive abdominal surgeries and ileostomy presents with persistent anemia     He has had blood transfusions in the past however anemia is ongoing. History of blood loss in the ostomy. Currently with blood in the ostomy after he had an episode of coughing.   Bleeding is local issue here and is not like in DIC. Coags and platelets are normal.  Please send fibrinogen d-dimer pt ptt again. It is possible that fibrinogen will be low and that he would then require cryoprecipitate.  He bled after coughing again yesterday. These areas were cauterized.  Today there is no teetee bleeding. He is on bipap however. Coags being resent today. Fibrinogen is 219 which is much lower than would be expected in a pt with bleeding covid. It is an acute phase reactant and should be elevated which it's not.  Labs which I requested are being sent, awaiting results. Please call me to discuss [phone]. If coags same as prior,ie, mild elevation of INR would consider fresh frozen plasma. If fibrinogen low cryoprecipitate.   At first sign of any bleeding would administer cryoprecipitate.   77 yo man with a history of AFib/Flutter s/p DCCV, Crohn's, prior ileocectomy and open appendectomy, extensive abdominal surgeries and ileostomy presents with persistent anemia     He has had blood transfusions in the past however anemia is ongoing. History of blood loss in the ostomy. Currently with blood in the ostomy after he had an episode of coughing.   Bleeding is local issue here and is not like in DIC. Coags and platelets are normal.  Please send fibrinogen d-dimer pt ptt again. It is possible that fibrinogen will be low and that he would then require cryoprecipitate.  He bled after coughing again yesterday. These areas were cauterized.  Today there is no teetee bleeding. He is on bipap however. Coags being resent today. Fibrinogen is 219 which is much lower than would be expected in a pt with bleeding covid. It is an acute phase reactant and should be elevated which it's not.  Labs which I requested are being sent, awaiting results. Please call me to discuss [phone]. If coags same as prior,ie, mild elevation of INR would consider fresh frozen plasma. If fibrinogen low cryoprecipitate.   The pt still oozes with coughing and there's no reason to believe that it won't happen again. I would administer cryoprecipitate 10 units to try to help stop bleeding and prevent further bleeding. It may give more time for bleeding sites to heal.

## 2024-04-04 NOTE — DISCHARGE NOTE NURSING/CASE MANAGEMENT/SOCIAL WORK - NSDCVIVACCINE_GEN_ALL_CORE_FT
influenza, high-dose, quadrivalent; 23-Jan-2024 11:29; Deandra Mackay (RN); [employer]; [id number] (Exp. Date: 30-Jun-2024); IntraMuscular; Deltoid Left.; 0.7 milliLiter(s); VIS (VIS Published: 06-Aug-2021, VIS Presented: 23-Jan-2024);

## 2024-04-04 NOTE — PROGRESS NOTE ADULT - SUBJECTIVE AND OBJECTIVE BOX
INTERVAL HPI/OVERNIGHT EVENTS:    SUBJECTIVE: Patient seen and examined at bedside.    OBJECTIVE:    VITAL SIGNS:  ICU Vital Signs Last 24 Hrs  T(C): 36.2 (04 Apr 2024 06:51), Max: 36.8 (03 Apr 2024 16:41)  T(F): 97.2 (04 Apr 2024 06:51), Max: 98.3 (03 Apr 2024 16:41)  HR: 70 (04 Apr 2024 04:13) (69 - 108)  BP: 137/62 (04 Apr 2024 04:13) (119/61 - 148/68)  BP(mean): 89 (04 Apr 2024 04:13) (83 - 94)  ABP: --  ABP(mean): --  RR: 18 (04 Apr 2024 04:13) (18 - 21)  SpO2: 92% (04 Apr 2024 04:13) (91% - 100%)    O2 Parameters below as of 04 Apr 2024 04:13  Patient On (Oxygen Delivery Method): BiPAP/CPAP    O2 Concentration (%): 40          04-03 @ 07:01  -  04-04 @ 07:00  --------------------------------------------------------  IN: 527.4 mL / OUT: 1150 mL / NET: -622.6 mL      CAPILLARY BLOOD GLUCOSE      POCT Blood Glucose.: 161 mg/dL (04 Apr 2024 05:59)      PHYSICAL EXAM:    General: NAD  HEENT: anicteric sclera, MMM  Neck: supple, no JVD  Respiratory: CTA B/L; no W/R/R  Cardiovascular: +S1/S2; RRR; no M/R/G  Gastrointestinal: soft, NT/ND; +BS x4  Extremities: WWP; 2+ peripheral pulses B/L; no LE edema  Skin: normal color and turgor; no rash  Neurological: A&Ox    MEDICATIONS:  MEDICATIONS  (STANDING):  albuterol/ipratropium for Nebulization 3 milliLiter(s) Nebulizer every 6 hours  atorvastatin 20 milliGRAM(s) Oral at bedtime  ceFAZolin   IVPB 2000 milliGRAM(s) IV Intermittent every 8 hours  cholestyramine Powder (Sugar-Free) 4 Gram(s) Oral every 24 hours  dexAMETHasone  Injectable 6 milliGRAM(s) IV Push every 24 hours  dextrose 5%. 1000 milliLiter(s) (50 mL/Hr) IV Continuous <Continuous>  dextrose 5%. 1000 milliLiter(s) (100 mL/Hr) IV Continuous <Continuous>  dextrose 50% Injectable 25 Gram(s) IV Push once  dextrose 50% Injectable 25 Gram(s) IV Push once  dextrose 50% Injectable 12.5 Gram(s) IV Push once  glucagon  Injectable 1 milliGRAM(s) IntraMuscular once  levothyroxine 50 MICROGram(s) Oral every 24 hours  lipid, fat emulsion (Fish Oil and Plant Based) 20% Infusion 0.5247 Gm/kG/Day (20.83 mL/Hr) IV Continuous <Continuous>  melatonin 5 milliGRAM(s) Oral at bedtime  metoprolol tartrate 25 milliGRAM(s) Oral every 12 hours  pancrelipase  (CREON 24,000 Lipase Units) 1 Capsule(s) Oral three times a day with meals  Parenteral Nutrition - Adult 1 Each TPN Continuous <Continuous>  venlafaxine XR. 300 milliGRAM(s) Oral every 24 hours    MEDICATIONS  (PRN):  acetaminophen     Tablet .. 650 milliGRAM(s) Oral every 6 hours PRN Temp greater or equal to 38C (100.4F), Mild Pain (1 - 3)  benzonatate 100 milliGRAM(s) Oral three times a day PRN Cough  dextrose Oral Gel 15 Gram(s) Oral once PRN Blood Glucose LESS THAN 70 milliGRAM(s)/deciliter  guaiFENesin Oral Liquid (Sugar-Free) 100 milliGRAM(s) Oral every 6 hours PRN Cough  LORazepam     Tablet 0.5 milliGRAM(s) Oral at bedtime PRN Insomnia  trimethobenzamide Injectable 200 milliGRAM(s) IntraMuscular every 6 hours PRN Nausea and/or Vomiting      ALLERGIES:  Allergies    penicillin (Angioedema)    Intolerances        LABS:                        9.4    18.86 )-----------( 218      ( 04 Apr 2024 05:30 )             29.7     04-04    137  |  101  |  47<H>  ----------------------------<  139<H>  4.9   |  30  |  1.26    Ca    8.0<L>      04 Apr 2024 05:30  Phos  3.9     04-04  Mg     2.5     04-04    TPro  7.1  /  Alb  2.0<L>  /  TBili  4.7<H>  /  DBili  x   /  AST  180<H>  /  ALT  96<H>  /  AlkPhos  110  04-04    LIVER FUNCTIONS - ( 04 Apr 2024 05:30 )  Alb: 2.0 g/dL / Pro: 7.1 g/dL / ALK PHOS: 110 U/L / ALT: 96 U/L / AST: 180 U/L / GGT: x           PT/INR - ( 04 Apr 2024 05:30 )   PT: 14.5 sec;   INR: 1.28          PTT - ( 04 Apr 2024 05:30 )  PTT:30.5 sec  Urinalysis Basic - ( 04 Apr 2024 05:30 )    Color: x / Appearance: x / SG: x / pH: x  Gluc: 139 mg/dL / Ketone: x  / Bili: x / Urobili: x   Blood: x / Protein: x / Nitrite: x   Leuk Esterase: x / RBC: x / WBC x   Sq Epi: x / Non Sq Epi: x / Bacteria: x              RADIOLOGY & ADDITIONAL TESTS: Reviewed.    ASSESSMENT:    PLAN:     NEURO   #     RESP   #     CARDIAC   #Shock 2/2   Volume:   Perfusion:   Hemodynamic:      GI   #     RENAL   #     ENDO   #     ID   #     HEME/ONC   #     PREVENTATIVE   F:   E: replete as needed, maintain K>4, Mg>2  N:   DVT PPx:    GI PPx:   Lines/Tubes:   Code:   Dispo: MICU INTERVAL HPI/OVERNIGHT EVENTS: ordered unit of pRBC for hemoglobin of 7.9; 30 beats 2:1 flutter     SUBJECTIVE: Patient seen and examined at bedside. states he is feeling well, endorses bleeding from ostomy, reports improved. denies fever, chills, headache, chest pain, or abdominal pain.    OBJECTIVE:    VITAL SIGNS:  ICU Vital Signs Last 24 Hrs  T(C): 36.2 (04 Apr 2024 06:51), Max: 36.8 (03 Apr 2024 16:41)  T(F): 97.2 (04 Apr 2024 06:51), Max: 98.3 (03 Apr 2024 16:41)  HR: 70 (04 Apr 2024 04:13) (69 - 108)  BP: 137/62 (04 Apr 2024 04:13) (119/61 - 148/68)  BP(mean): 89 (04 Apr 2024 04:13) (83 - 94)  ABP: --  ABP(mean): --  RR: 18 (04 Apr 2024 04:13) (18 - 21)  SpO2: 92% (04 Apr 2024 04:13) (91% - 100%)    O2 Parameters below as of 04 Apr 2024 04:13  Patient On (Oxygen Delivery Method): BiPAP/CPAP    O2 Concentration (%): 40          04-03 @ 07:01  -  04-04 @ 07:00  --------------------------------------------------------  IN: 527.4 mL / OUT: 1150 mL / NET: -622.6 mL      CAPILLARY BLOOD GLUCOSE      POCT Blood Glucose.: 161 mg/dL (04 Apr 2024 05:59)      PHYSICAL EXAM:    General: NAD  HEENT: anicteric sclera, MMM  Neck: supple, no JVD  Respiratory: CTA B/L; no W/R/R  Cardiovascular: +S1/S2; RRR; no M/R/G  Gastrointestinal: soft, NT/ND; +BS x4, bright blood in ostomy, minimal (recently exchanged)  Extremities: WWP; 2+ peripheral pulses B/L; no LE edema  Skin: normal color and turgor; no rash  Neurological: A&Ox    MEDICATIONS:  MEDICATIONS  (STANDING):  albuterol/ipratropium for Nebulization 3 milliLiter(s) Nebulizer every 6 hours  atorvastatin 20 milliGRAM(s) Oral at bedtime  ceFAZolin   IVPB 2000 milliGRAM(s) IV Intermittent every 8 hours  cholestyramine Powder (Sugar-Free) 4 Gram(s) Oral every 24 hours  dexAMETHasone  Injectable 6 milliGRAM(s) IV Push every 24 hours  dextrose 5%. 1000 milliLiter(s) (50 mL/Hr) IV Continuous <Continuous>  dextrose 5%. 1000 milliLiter(s) (100 mL/Hr) IV Continuous <Continuous>  dextrose 50% Injectable 25 Gram(s) IV Push once  dextrose 50% Injectable 25 Gram(s) IV Push once  dextrose 50% Injectable 12.5 Gram(s) IV Push once  glucagon  Injectable 1 milliGRAM(s) IntraMuscular once  levothyroxine 50 MICROGram(s) Oral every 24 hours  lipid, fat emulsion (Fish Oil and Plant Based) 20% Infusion 0.5247 Gm/kG/Day (20.83 mL/Hr) IV Continuous <Continuous>  melatonin 5 milliGRAM(s) Oral at bedtime  metoprolol tartrate 25 milliGRAM(s) Oral every 12 hours  pancrelipase  (CREON 24,000 Lipase Units) 1 Capsule(s) Oral three times a day with meals  Parenteral Nutrition - Adult 1 Each TPN Continuous <Continuous>  venlafaxine XR. 300 milliGRAM(s) Oral every 24 hours    MEDICATIONS  (PRN):  acetaminophen     Tablet .. 650 milliGRAM(s) Oral every 6 hours PRN Temp greater or equal to 38C (100.4F), Mild Pain (1 - 3)  benzonatate 100 milliGRAM(s) Oral three times a day PRN Cough  dextrose Oral Gel 15 Gram(s) Oral once PRN Blood Glucose LESS THAN 70 milliGRAM(s)/deciliter  guaiFENesin Oral Liquid (Sugar-Free) 100 milliGRAM(s) Oral every 6 hours PRN Cough  LORazepam     Tablet 0.5 milliGRAM(s) Oral at bedtime PRN Insomnia  trimethobenzamide Injectable 200 milliGRAM(s) IntraMuscular every 6 hours PRN Nausea and/or Vomiting      ALLERGIES:  Allergies    penicillin (Angioedema)    Intolerances        LABS:                        9.4    18.86 )-----------( 218      ( 04 Apr 2024 05:30 )             29.7     04-04    137  |  101  |  47<H>  ----------------------------<  139<H>  4.9   |  30  |  1.26    Ca    8.0<L>      04 Apr 2024 05:30  Phos  3.9     04-04  Mg     2.5     04-04    TPro  7.1  /  Alb  2.0<L>  /  TBili  4.7<H>  /  DBili  x   /  AST  180<H>  /  ALT  96<H>  /  AlkPhos  110  04-04    LIVER FUNCTIONS - ( 04 Apr 2024 05:30 )  Alb: 2.0 g/dL / Pro: 7.1 g/dL / ALK PHOS: 110 U/L / ALT: 96 U/L / AST: 180 U/L / GGT: x           PT/INR - ( 04 Apr 2024 05:30 )   PT: 14.5 sec;   INR: 1.28          PTT - ( 04 Apr 2024 05:30 )  PTT:30.5 sec  Urinalysis Basic - ( 04 Apr 2024 05:30 )    Color: x / Appearance: x / SG: x / pH: x  Gluc: 139 mg/dL / Ketone: x  / Bili: x / Urobili: x   Blood: x / Protein: x / Nitrite: x   Leuk Esterase: x / RBC: x / WBC x   Sq Epi: x / Non Sq Epi: x / Bacteria: x              RADIOLOGY & ADDITIONAL TESTS: Reviewed.    ASSESSMENT:    PLAN:     NEURO   #     RESP   #     CARDIAC   #Shock 2/2   Volume:   Perfusion:   Hemodynamic:      GI   #     RENAL   #     ENDO   #     ID   #     HEME/ONC   #     PREVENTATIVE   F:   E: replete as needed, maintain K>4, Mg>2  N:   DVT PPx:    GI PPx:   Lines/Tubes:   Code:   Dispo: MICU

## 2024-04-04 NOTE — PROGRESS NOTE ADULT - PROBLEM SELECTOR PLAN 1
Found to be COVID-19 positive; Mild increase in WBC 12.18 on 3/30 , afebrile, endorses white sputum production. Overnight. patient reported SOB, CXR 3/30/23 concerning for congestion, s/p 3 U pRBC and subsequent dose of lasix 20 mg IV on 3/30/24. Given concern that patient flashed, given lasix which improved symptoms. However, given initial iWOB started on remesidivir and decadron. Patient initially on BIPAP overnight given SOB --> weaned to NC.     Plan:  - c/w weaning O2 as tolerated   - c/w remdesivir 100 mg QD (Day 3/5)  - c/w decadron 6 mg IV QD (Day 3/5) Found to be COVID-19 positive; Mild increase in WBC 12.18 on 3/30 , afebrile, endorses white sputum production. Overnight. patient reported SOB, CXR 3/30/23 concerning for congestion, s/p 3 U pRBC and subsequent dose of lasix 20 mg IV on 3/30/24. Given concern that patient flashed, given lasix which improved symptoms. However, given initial iWOB started on remesidivir and decadron. Patient initially on BIPAP overnight given SOB --> weaned to NC.     Plan:  - c/w weaning O2 as tolerated   - c/w remdesivir 100 mg QD x5d  - c/w decadron 6 mg IV QD x5d

## 2024-04-05 NOTE — DISCHARGE NOTE PROVIDER - HOSPITAL COURSE
Patient is a 77yo M with PMH of AFib/Flutter s/p DCCV, Crohn's, prior ileocectomy and open appendectomy, extensive abdominal surgeries and ileostomy, short-bowel syndrome, hypothyroidism, depression, recently seen at the Emergency Department for blood transfusion and multiple recent admissions for transfusions who presents as a direct admission for recurrent anemia. Course complicated by increasing 02 requirements and SOB, improved with IV Lasix stepped up to tele for further monitoring. Noted to be COVID positive,  now breathing comfortably on room air following tx w remdesivir & decadron, w bleeding improved & hgb stable following transfusions w prbcs & cryoprecipitate.    Hospital Course (by problem):     Problem/Plan - 1:  ·  Problem: 2019 novel coronavirus disease (COVID-19).   ·  Plan: Found to be COVID-19 positive; Mild increase in WBC 12.18 on 3/30 , afebrile, endorses white sputum production. Overnight. patient reported SOB, CXR 3/30/23 concerning for congestion, s/p 3 U pRBC and subsequent dose of lasix 20 mg IV on 3/30/24. Given concern that patient flashed, given lasix which improved symptoms. However, given initial iWOB started on remesidivir and decadron. Patient initially on BIPAP overnight given SOB --> weaned to NC, now breathing comfortably on RA following completion of remdesivir course.  RESOLVED     Problem/Plan - 2:  ·  Problem: HAP (hospital-acquired pneumonia).   ·  Plan: 3/30 pt complaining of feeling congested, requesting lasix. CXR with congestion and/or infiltrates. WBC uptrending 9->11->12. Per private nurse pt developed a productive cough as of 3/27 and his oxygen demands are new as of today. Meeting 1/4 SIRS criteria for WBC (off b-blocker for bleed). Denies fevers/chills.   S/p 20mg Lasix IVP x2; BIPAP. Found to be COVID positive, continuing with empiric CAP coverage given new presentation and elevated procal 0.42. Sputum cultures growing MSSA. ID consulted, tx w cefazolin 2g q8h.    - continue cephalexin *** qid for 4 days to complete full 7d tx course     Problem/Plan - 3:  ·  Problem: Anemia due to acute blood loss.   ·  Plan: Patient with downtrending hgb, likely 2/2 oozing from stoma site. On admission, hgb 7.6 (had received 2u pRBC 2 days ago for hgb 5.9). Hematology consulted given ongoing anemia and the need for multiple blood transfusions, transfused w 10u cryoprecipitate per recs.    - outpatient follow up w hematology, vascular surgery for possible intervention     Problem/Plan - 4:  ·  Problem: History of short bowel syndrome.   ·  Plan: History of Short bowel syndrome. On prior admission, high output w/ EC fistula. On TPN at home, also on regular diet.    - continue TPN  - outpatient follow up w pcp     Problem/Plan - 5:  ·  Problem: Chronic atrial fibrillation.   ·  Plan: Diagnosis of AFib/Flutter s/p DCCVs.   Home medication: lopressor 50 mg QD PO, initially held, resumed following improvement of bleeding    - continue lopressor 50mg qd     Problem/Plan - 6:  ·  Problem: Crohn's disease.   ·  Plan: Pt with multiple surgery, s/p ostomy placement. Patient having bleeding from ostomy site. Surgery following for intervention.     - continue creon 24,000U TID w meals, ostomy bag care  - outpatient follow up w pcp     Problem/Plan - 7:  ·  Problem: HLD (hyperlipidemia).   ·  Plan: Home med: crestor 5mg qd.     - continue crestor 5mg qd     Problem/Plan - 8:  ·  Problem: Hypothyroidism.   ·  Plan: Synthroid 50 mcg qd    - C/w home medication.     Problem/Plan - 9:  ·  Problem: Depression, major.   ·  Plan: Home med: venlafaxine 150 mg 2 tabs QD.    - C/w home medication.    #Insomnia   - C/w home lorazepam 0.5mg HS PRN.    New medications: n/a  Changes to old medications: n/a  Items to follow up outpt: ostomy bleeding source control w vascular surgery     Physical Exam at time of discharge:  General: NAD  HEENT: PERRL, EOM, anicteric sclera, MMM  Respiratory: CTAB; no W/R/R  Cardiovascular: +S1/S2; RRR; no M/R/G  Gastrointestinal: soft, NT/ND; +BS x4; ostomy draining ***  Extremities: WWP; 2+ peripheral pulses bilaterally; no LE edema  Skin: normal color and turgor; no rash  Neurologic: AOx3, no focal deficits

## 2024-04-05 NOTE — PROGRESS NOTE ADULT - PROBLEM SELECTOR PLAN 8
Synthroid 50 mcg qd  - C/w home medication.
Home med: crestor 5mg qd.   - C/w therapeutic conversion lipitor 20mg qhs
Synthroid 50 mcg qd  - C/w home medication.
F: as above  E: replete as needed  N: regular/TPN  VTE Prophylaxis: SCDs   Activity: ambulate as tolerated  D: RMF -> 7L

## 2024-04-05 NOTE — ADVANCED PRACTICE NURSE CONSULT - APN SPECIALTY LIST
Patient signed in and then left shortly thereafter.  She was hostile to staff and refused to answer questions by RN.  Patient left before she could be assessed by writer.  Patient discharged to community resources.   
Wound Ostomy Care

## 2024-04-05 NOTE — PROGRESS NOTE ADULT - SUBJECTIVE AND OBJECTIVE BOX
79 yo man with a history of AFib/Flutter s/p DCCV, Crohn's, prior ileocectomy and open appendectomy, extensive abdominal surgeries and ileostomy presents with persistent anemia     He has had blood transfusions in the past however anemia is ongoing. History of blood loss in the ostomy. Currently with blood in the ostomy after he had an episode of coughing.   Bleeding is local issue here and is not like in DIC. Coags and platelets are normal.  Please send fibrinogen d-dimer pt ptt again. It is possible that fibrinogen will be low and that he would then require cryoprecipitate.  He bled after coughing again yesterday. These areas were cauterized.  Today there is no teetee bleeding. He is on bipap however. Coags being resent today. Fibrinogen is 219 which is much lower than would be expected in a pt with bleeding covid. It is an acute phase reactant and should be elevated which it's not.  Labs which I requested are being sent, awaiting results. Please call me to discuss [phone]. If coags same as prior,ie, mild elevation of INR would consider fresh frozen plasma. If fibrinogen low cryoprecipitate.   The pt still oozes with coughing and there's no reason to believe that it won't happen again. I would administer cryoprecipitate 10 units to try to help stop bleeding and prevent further bleeding. It may give more time for bleeding sites to heal.  Going home today. Had slight ooze last night. H.H being rechecked.  F/u blood count monday. If bleeding continues would ask IR again if there's anything to do.

## 2024-04-05 NOTE — PROGRESS NOTE ADULT - ATTENDING COMMENTS
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 Severely dilated right atrium. Pulmonary hypertension present, pulmonary artery systolic pressure is 42 mmHg. Left pleural effusion.  - TTE 1/04/2024:  Hyperdynamic left ventricular systolic function, ,moderate symmetric left ventricular hypertrophy, Grade II DD, normal RV function, normal atria, PASP 40mmHg, no pericardial effusion     #Tachyarrhythmias  - Patient with Hx of AFib/Flutter s/p multiple DCCV with 20 sec sinus pause during prior Hospitalization s/p Micra AV implant (02/2024), with 33 beats NSVT in setting of acute blood loss anemia (now improved), Hypoxic Respiratory Failure from Covid infection, clinically improved  - Patient's BB had been held this hospitalization when NSVT occurred.   - Echo this hospitalization reviewed showing normal BIV function. Patient has known GIIDD  - Tele reviewed today with rare couplets and PVCs. No further tachyarrhythmic episodes  - Would cont with Lopressor 50 mg po BID and aim to DC on Toprol 100 mg po daily for sustained BB effect  - To further optimize respiratory status as Hypoxia a  of arrhythmias, would diurese today with lasix 20 IVP today   - CHADVASC of at least 3, however patient unable to tolerate AC at this time. Will continue to defer for now.   - Cardiology will follow with you, please call with any questions .
AF, UC, s/p sbr with ileocolic resection, enteroatmospheric fistula, multiple bleeding episodes, covid 19  physical as above  continue remdesivir/decadron  ancef for MSSA in sputum  continue TPN  s/p transfusion

## 2024-04-05 NOTE — PROGRESS NOTE ADULT - PROVIDER SPECIALTY LIST ADULT
Cardiology
Internal Medicine
Colorectal Surgery
Colorectal Surgery
Heme/Onc
Internal Medicine
Internal Medicine
Surgery
Colorectal Surgery
Colorectal Surgery
Heme/Onc
Internal Medicine
Surgery
Internal Medicine
Surgery
Internal Medicine

## 2024-04-05 NOTE — DISCHARGE NOTE PROVIDER - NSDCCPCAREPLAN_GEN_ALL_CORE_FT
PRINCIPAL DISCHARGE DIAGNOSIS  Diagnosis: 2019 novel coronavirus disease (COVID-19)  Assessment and Plan of Treatment: You were found to have covid & likely hospital-aquired pneumonia while you were in the hospital, for which you were treated with decadron & remdesivir to address covid, and cefazolin to treat your pneumonia. Please complete your course of oral keflex for full, 7-day course of antibiotic treatment of hospital-acquired pneumonia.      SECONDARY DISCHARGE DIAGNOSES  Diagnosis: Anemia due to acute blood loss  Assessment and Plan of Treatment: You suffer from intermittent bleeding from your ostomy site, for which you were treated with transufions of packed red blood cells & cryoprecipitate to address local bleeding. Please follow up with your hematologist & vascular surgeons after discharge, in order to determine whether you may benefit from surgical interention to address the bleeding from your ostomy site, and monitor for hematological sequelae associated with any further bleeding.

## 2024-04-05 NOTE — PROGRESS NOTE ADULT - ASSESSMENT
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 Severely dilated right atrium. Pulmonary hypertension present, pulmonary artery systolic pressure is 42 mmHg. Left pleural effusion.   TTE 1/04/2024:  Hyperdynamic left ventricular systolic function, ,moderate symmetric left ventricular hypertrophy, Grade II DD, normal RV function, normal atria, PASP 40mmHg, no pericardial effusion   Tele: now in NSR, had afib with RVR, night 3/4 had a 30 beats of NSVT.     #NSVT/afib   Patient with Hx of AFib/Flutter s/p multiple DCCV with 20 sec sinus pause during prior Hospitalization s/p Micra AV implant (02/2024), now with  NSVT in setting of acute blood loss anemia.   BB was held on admission and was restarted only at half dose. He is on Lopressor 50 mg po BID at home  Clinically patient is warm, well perfused, improved breath sounds, still has mildly reduced on the right. Improved saturation, now 98 on RA   c/w Lopressor 50 mg po BID  Keep K>4.5 and Mag>2  CHADVASC of at least 3, however patient unable to tolerate AC at this time. Will continue to defer for now.     Patient will follow up with his cardiologist Dr. Aguiar

## 2024-04-05 NOTE — PROGRESS NOTE ADULT - REASON FOR ADMISSION
blood loss anemia

## 2024-04-05 NOTE — DISCHARGE NOTE PROVIDER - CARE PROVIDER_API CALL
Daniel Aguiar  Internal Medicine  [address] [place] [id number]  Phone: [phone]  Fax: [phone]  Established Patient  Follow Up Time: Routine

## 2024-04-05 NOTE — PROGRESS NOTE ADULT - PROBLEM SELECTOR PLAN 10
Home med: venlafaxine 150 mg 2 tabs QD.  - C/w home medication.    #Insomnia   - C/w home lorazepam 0.5mg HS PRN
F: as above  E: replete as needed  N: regular/TPN  VTE Prophylaxis: SCDs   Activity: ambulate as tolerated  D: 7L   FULL CODE
F: TPN with luca  E: replete as needed  N: regular/TPN  VTE Prophylaxis: SCDs   Activity: ambulate as tolerated  D: 7L   FULL CODE

## 2024-04-05 NOTE — PROGRESS NOTE ADULT - PROBLEM SELECTOR PROBLEM 8
Hypothyroidism
Hypothyroidism
HLD (hyperlipidemia)
Hypothyroidism
Prophylactic measure

## 2024-04-05 NOTE — PROGRESS NOTE ADULT - PROBLEM SELECTOR PROBLEM 9
Depression, major
Hypothyroidism

## 2024-04-05 NOTE — PROGRESS NOTE ADULT - PROBLEM SELECTOR PROBLEM 1
2019 novel coronavirus disease (COVID-19)
2019 novel coronavirus disease (COVID-19)
Anemia due to acute blood loss
Anemia due to acute blood loss
2019 novel coronavirus disease (COVID-19)
2019 novel coronavirus disease (COVID-19)
Shortness of breath
2019 novel coronavirus disease (COVID-19)
2019 novel coronavirus disease (COVID-19)

## 2024-04-05 NOTE — ADVANCED PRACTICE NURSE CONSULT - ASSESSMENT
Pt had begun bleeding at periwound so current appliance had to be removed. Surgery resident at bedside to help stop bleeding. After bleeding was under control, new Coloplast wound/fistula manager applied. Perifistula area protected with Cavilon barrier wipes and stoma rings prior to placing appliance. 
Pt alerted WOCN that output had changed color. Upon assessment, bloody drainage noted in pouch, 300 cc's of blood mixed with effluent. Team 5 made aware. When appliance removed, no bleeding noted but patient coughed and caused an area to begin bleeding, which was easily stopped. Perifistular edges protected with stoma rings and stoma paste, new Coloplast wound/fistula manager applied over site. Pt tolerated procedure well. 
Pt's appliance with blood-tinged fluid but not leaking at this time. Appliance removed to assess for active bleeding. Denudement at perifistular area of wound bed, fistula currently producing light green semi-thin effluent. Perifustular area protected with Cavilon barrier wipes and stoma rings. Dustin's wound manager applied around fistula and denuded area. Extra supplies at bedside. 
Called d/t blood noted in fitula pouch. Pouch emptied revealing a couple blood clots. No active bleeding noted. Fistula output light brown, thick.   Dr Peterson at bedside. 
Check in on pouch prior to d/c today. Some bleeding overnight but clotted and stopped.    LUQ EC fistula: pouch intact without any leakage. + thick light brown stool with blood in pouch. Pouch emptied and rinsed - noted blood clots of peristomal skin but no active bleeding.  RLQ ileostomy: pouch intact,  +thin brown effluent

## 2024-04-05 NOTE — ADVANCED PRACTICE NURSE CONSULT - RECOMMEDATIONS
D/c home today  supplies at bedside; pt has excellent nursing care at home    Total time spent: 45  minutes
Monitor for any further bleeding.     Total time spent: 30 minutes
Will continue to follow. Amount of time spent on encounter=75 minutes.
Pt to be discharged to private home care services.
Will continue to follow

## 2024-04-05 NOTE — PROGRESS NOTE ADULT - ASSESSMENT
Patient is a 77yo M with PMH of AFib/Flutter s/p DCCV, Crohn's, prior ileocectomy and open appendectomy, extensive abdominal surgeries and ileostomy, short-bowel syndrome, hypothyroidism, depression, recently seen at the Emergency Department for blood transfusion and multiple recent admissions for transfusions who presents as a direct admission for recurrent anemia. Course complicated by increasing 02 requirements and SOB, improved with IV Lasix stepped up to tele for further monitoring. Noted to be COVID positive, breathing comfortably with nasal cannula; remdesivir started. intermittently bleeding from ostomy

## 2024-04-05 NOTE — DISCHARGE NOTE PROVIDER - NSDCMRMEDTOKEN_GEN_ALL_CORE_FT
allopurinol 300 mg oral tablet: 1 tab(s) orally once a day  Ativan 0.5 mg oral tablet: 1 tab(s) orally once a day (at bedtime) as needed for  insomnia  cephalexin 500 mg oral tablet: 2 tab(s) orally 4 times a day  ChloraPrep One-Step 2% topical pad: Apply topically to affected area once a day 1 Apply topically to affected area  cholestyramine 4 g/8.3 g oral powder for reconstitution: 4 gram(s) orally once a day  intravenous electrolyte (Lypholyte II/Nutrilyte II/TPN Electrolytes) solution: 1 each intravenous once a day  Lopressor 50 mg oral tablet: 1 tab(s) orally once a day  Lovenox 40 mg/0.4 mL injectable solution: 40 milligram(s) subcutaneously once a day  pancrelipase 24,000 units-76,000 units-120,000 units oral delayed release capsule: 1 cap(s) orally 3 times a day (with meals)  rosuvastatin 5 mg oral tablet: 1 tab(s) orally once a day  Synthroid 50 mcg (0.05 mg) oral tablet: 1 tab(s) orally once a day  teduglutide 5 mg subcutaneous kit: 0.05 mg/kg subcutaneously once a day  venlafaxine 150 mg oral tablet, extended release: 2 tab(s) orally once a day

## 2024-04-05 NOTE — PROGRESS NOTE ADULT - PROBLEM SELECTOR PROBLEM 10
Prophylactic measure
Depression, major

## 2024-04-05 NOTE — PROGRESS NOTE ADULT - SUBJECTIVE AND OBJECTIVE BOX
Cardiology Consult    O/N:  Interval History: patient was seen and examined in am before d/c   Telemetry: couplests, a few triplets. no more SVT runs     OBJECTIVE  Vitals:  T(C): 36.6 (04-05-24 @ 06:30), Max: 36.6 (04-04-24 @ 22:53)  HR: 62 (04-05-24 @ 11:35) (62 - 90)  BP: 122/60 (04-05-24 @ 11:35) (116/57 - 129/60)  RR: 19 (04-05-24 @ 11:35) (16 - 19)  SpO2: 91% (04-05-24 @ 11:35) (91% - 98%)  Wt(kg): --    I/O:  I&O's Summary    04 Apr 2024 07:01  -  05 Apr 2024 07:00  --------------------------------------------------------  IN: 2190.8 mL / OUT: 2400 mL / NET: -209.2 mL    05 Apr 2024 07:01  -  05 Apr 2024 15:35  --------------------------------------------------------  IN: 0 mL / OUT: 400 mL / NET: -400 mL        PHYSICAL EXAM:     GEN: Awake, comfortable. NAD.   HEENT: NCAT, No JVD.   RESP: decreased breath sounds on the right (improved)   CV: RRR, normal s1/s2. No m/r/g.  ABD: Soft  EXT: Warm. trace LE edema   NEURO: AAOx3. No focal deficits  	  LABS:                        9.3    14.86 )-----------( 173      ( 05 Apr 2024 05:30 )             29.7     04-05    136  |  102  |  51<H>  ----------------------------<  151<H>  5.1   |  29  |  1.41<H>    Ca    7.9<L>      05 Apr 2024 05:30  Phos  4.8     04-05  Mg     2.7     04-05    TPro  7.0  /  Alb  2.0<L>  /  TBili  4.6<H>  /  DBili  x   /  AST  169<H>  /  ALT  77<H>  /  AlkPhos  109  04-05    PT/INR - ( 04 Apr 2024 05:30 )   PT: 14.5 sec;   INR: 1.28          PTT - ( 04 Apr 2024 05:30 )  PTT:30.5 sec  Urinalysis Basic - ( 05 Apr 2024 05:30 )    Color: x / Appearance: x / SG: x / pH: x  Gluc: 151 mg/dL / Ketone: x  / Bili: x / Urobili: x   Blood: x / Protein: x / Nitrite: x   Leuk Esterase: x / RBC: x / WBC x   Sq Epi: x / Non Sq Epi: x / Bacteria: x        RADIOLOGY & ADDITIONAL TESTS:  Reviewed .    MEDICATIONS  (STANDING):  albuterol/ipratropium for Nebulization 3 milliLiter(s) Nebulizer every 6 hours  atorvastatin 20 milliGRAM(s) Oral at bedtime  ceFAZolin   IVPB 2000 milliGRAM(s) IV Intermittent every 8 hours  cholestyramine Powder (Sugar-Free) 4 Gram(s) Oral every 24 hours  dexAMETHasone  Injectable 6 milliGRAM(s) IV Push every 24 hours  dextrose 5%. 1000 milliLiter(s) (50 mL/Hr) IV Continuous <Continuous>  dextrose 5%. 1000 milliLiter(s) (100 mL/Hr) IV Continuous <Continuous>  dextrose 50% Injectable 25 Gram(s) IV Push once  dextrose 50% Injectable 12.5 Gram(s) IV Push once  dextrose 50% Injectable 25 Gram(s) IV Push once  glucagon  Injectable 1 milliGRAM(s) IntraMuscular once  levothyroxine 50 MICROGram(s) Oral every 24 hours  melatonin 5 milliGRAM(s) Oral at bedtime  metoprolol tartrate 50 milliGRAM(s) Oral two times a day  pancrelipase  (CREON 24,000 Lipase Units) 1 Capsule(s) Oral three times a day with meals  Parenteral Nutrition - Adult 1 Each TPN Continuous <Continuous>  venlafaxine XR. 300 milliGRAM(s) Oral every 24 hours    MEDICATIONS  (PRN):  acetaminophen     Tablet .. 650 milliGRAM(s) Oral every 6 hours PRN Temp greater or equal to 38C (100.4F), Mild Pain (1 - 3)  benzonatate 100 milliGRAM(s) Oral three times a day PRN Cough  dextrose Oral Gel 15 Gram(s) Oral once PRN Blood Glucose LESS THAN 70 milliGRAM(s)/deciliter  guaiFENesin Oral Liquid (Sugar-Free) 100 milliGRAM(s) Oral every 6 hours PRN Cough  LORazepam     Tablet 0.5 milliGRAM(s) Oral at bedtime PRN Insomnia  trimethobenzamide Injectable 200 milliGRAM(s) IntraMuscular every 6 hours PRN Nausea and/or Vomiting

## 2024-04-05 NOTE — PROGRESS NOTE ADULT - SUBJECTIVE AND OBJECTIVE BOX
**INCOMPLETE NOTE    OVERNIGHT EVENTS: None    SUBJECTIVE:  Patient seen and examined at bedside, comfortable, NAD. Denied fever, chest pain, dyspnea, abdominal pain.     Vital Signs Last 12 Hrs  T(F): 96.8 (04-05-24 @ 01:00), Max: 97.9 (04-04-24 @ 22:53)  HR: 62 (04-05-24 @ 04:05) (62 - 90)  BP: 116/57 (04-05-24 @ 04:05) (116/57 - 129/60)  BP(mean): 82 (04-05-24 @ 04:05) (82 - 87)  RR: 17 (04-05-24 @ 04:05) (16 - 18)  SpO2: 97% (04-05-24 @ 04:05) (91% - 98%)  I&O's Summary    03 Apr 2024 07:01  -  04 Apr 2024 07:00  --------------------------------------------------------  IN: 2541.2 mL / OUT: 2025 mL / NET: 516.2 mL    04 Apr 2024 07:01  -  05 Apr 2024 05:43  --------------------------------------------------------  IN: 2190.8 mL / OUT: 2400 mL / NET: -209.2 mL        PHYSICAL EXAM:  Constitutional: NAD, comfortable in bed.  HEENT: NC/AT, PERRLA, EOMI, no conjunctival pallor or scleral icterus, MMM  Neck: Supple, no JVD  Respiratory: CTA B/L. No w/r/r.   Cardiovascular: RRR, normal S1 and S2, no m/r/g.   Gastrointestinal: +BS, soft NTND, no guarding or rebound tenderness, no palpable masses   Extremities: wwp; no cyanosis, clubbing or edema.   Vascular: Pulses equal and strong throughout.   Neurological: AAOx3, no CN deficits, strength and sensation intact throughout.   Skin: No gross skin abnormalities or rashes        LABS:                        9.4    18.86 )-----------( 218      ( 04 Apr 2024 05:30 )             29.7     04-04    136  |  101  |  47<H>  ----------------------------<  170<H>  4.9   |  29  |  1.42<H>    Ca    8.0<L>      04 Apr 2024 20:42  Phos  3.9     04-04  Mg     2.4     04-04    TPro  7.1  /  Alb  2.0<L>  /  TBili  4.7<H>  /  DBili  x   /  AST  180<H>  /  ALT  96<H>  /  AlkPhos  110  04-04    PT/INR - ( 04 Apr 2024 05:30 )   PT: 14.5 sec;   INR: 1.28          PTT - ( 04 Apr 2024 05:30 )  PTT:30.5 sec  Urinalysis Basic - ( 04 Apr 2024 20:42 )    Color: x / Appearance: x / SG: x / pH: x  Gluc: 170 mg/dL / Ketone: x  / Bili: x / Urobili: x   Blood: x / Protein: x / Nitrite: x   Leuk Esterase: x / RBC: x / WBC x   Sq Epi: x / Non Sq Epi: x / Bacteria: x          RADIOLOGY & ADDITIONAL TESTS:    MEDICATIONS  (STANDING):  albuterol/ipratropium for Nebulization 3 milliLiter(s) Nebulizer every 6 hours  atorvastatin 20 milliGRAM(s) Oral at bedtime  ceFAZolin   IVPB 2000 milliGRAM(s) IV Intermittent every 8 hours  cholestyramine Powder (Sugar-Free) 4 Gram(s) Oral every 24 hours  dexAMETHasone  Injectable 6 milliGRAM(s) IV Push every 24 hours  dextrose 5%. 1000 milliLiter(s) (50 mL/Hr) IV Continuous <Continuous>  dextrose 5%. 1000 milliLiter(s) (100 mL/Hr) IV Continuous <Continuous>  dextrose 50% Injectable 25 Gram(s) IV Push once  dextrose 50% Injectable 12.5 Gram(s) IV Push once  dextrose 50% Injectable 25 Gram(s) IV Push once  glucagon  Injectable 1 milliGRAM(s) IntraMuscular once  levothyroxine 50 MICROGram(s) Oral every 24 hours  lipid, fat emulsion (Fish Oil and Plant Based) 20% Infusion 0.5247 Gm/kG/Day (20.83 mL/Hr) IV Continuous <Continuous>  melatonin 5 milliGRAM(s) Oral at bedtime  metoprolol tartrate 50 milliGRAM(s) Oral two times a day  pancrelipase  (CREON 24,000 Lipase Units) 1 Capsule(s) Oral three times a day with meals  Parenteral Nutrition - Adult 1 Each TPN Continuous <Continuous>  venlafaxine XR. 300 milliGRAM(s) Oral every 24 hours    MEDICATIONS  (PRN):  acetaminophen     Tablet .. 650 milliGRAM(s) Oral every 6 hours PRN Temp greater or equal to 38C (100.4F), Mild Pain (1 - 3)  benzonatate 100 milliGRAM(s) Oral three times a day PRN Cough  dextrose Oral Gel 15 Gram(s) Oral once PRN Blood Glucose LESS THAN 70 milliGRAM(s)/deciliter  guaiFENesin Oral Liquid (Sugar-Free) 100 milliGRAM(s) Oral every 6 hours PRN Cough  LORazepam     Tablet 0.5 milliGRAM(s) Oral at bedtime PRN Insomnia  trimethobenzamide Injectable 200 milliGRAM(s) IntraMuscular every 6 hours PRN Nausea and/or Vomiting

## 2024-04-05 NOTE — PROGRESS NOTE ADULT - PROBLEM SELECTOR PLAN 1
Found to be COVID-19 positive; Mild increase in WBC 12.18 on 3/30 , afebrile, endorses white sputum production. Overnight. patient reported SOB, CXR 3/30/23 concerning for congestion, s/p 3 U pRBC and subsequent dose of lasix 20 mg IV on 3/30/24. Given concern that patient flashed, given lasix which improved symptoms. However, given initial iWOB started on remesidivir and decadron. Patient initially on BIPAP overnight given SOB --> weaned to NC.     Plan:  - c/w weaning O2 as tolerated   - c/w remdesivir 100 mg QD x5d  - c/w decadron 6 mg IV QD x5d

## 2024-04-08 NOTE — PROGRESS NOTE ADULT - SUBJECTIVE AND OBJECTIVE BOX
SUBJECTIVE:   Patient seen and examined at bedside this AM; mentating appropriately and in no acute distress   Required frequent dressing changes (about Q3) yesterday since starting clear liquid. JOYCE drain output simultaneously increased   Slight drop in urine output noted   No abdominal pain, nausea, emesis or chills       MEDICATIONS  (STANDING):  chlorhexidine 2% Cloths 1 Application(s) Topical <User Schedule>  cholestyramine Powder (Sugar-Free) 4 Gram(s) Oral two times a day  dextrose 5%. 1000 milliLiter(s) (100 mL/Hr) IV Continuous <Continuous>  dextrose 5%. 1000 milliLiter(s) (50 mL/Hr) IV Continuous <Continuous>  dextrose 50% Injectable 25 Gram(s) IV Push once  dextrose 50% Injectable 12.5 Gram(s) IV Push once  dextrose 50% Injectable 25 Gram(s) IV Push once  glucagon  Injectable 1 milliGRAM(s) IntraMuscular once  heparin   Injectable 5000 Unit(s) SubCutaneous every 8 hours  insulin lispro (ADMELOG) corrective regimen sliding scale   SubCutaneous every 6 hours  levothyroxine 50 MICROGram(s) Oral daily  lipid, fat emulsion (Fish Oil and Plant Based) 20% Infusion 1 Gm/kG/Day (41.67 mL/Hr) IV Continuous <Continuous>  loperamide 4 milliGRAM(s) Oral every 6 hours  melatonin 5 milliGRAM(s) Oral at bedtime  metoprolol tartrate 25 milliGRAM(s) Oral every 12 hours  nystatin Powder 1 Application(s) Topical three times a day  pantoprazole    Tablet 40 milliGRAM(s) Oral before breakfast  Parenteral Nutrition - Adult 1 Each (83 mL/Hr) TPN Continuous <Continuous>    MEDICATIONS  (PRN):  benzocaine/menthol Lozenge 2 Lozenge Oral every 4 hours PRN Sore Throat  dextrose Oral Gel 15 Gram(s) Oral once PRN Blood Glucose LESS THAN 70 milliGRAM(s)/deciliter  meclizine 25 milliGRAM(s) Oral every 6 hours PRN Nausea  ondansetron Injectable 4 milliGRAM(s) IV Push every 8 hours PRN Nausea and/or Vomiting      Vital Signs Last 24 Hrs  T(C): 36.9 (20 Sep 2023 01:17), Max: 37.5 (19 Sep 2023 21:59)  T(F): 98.4 (20 Sep 2023 01:17), Max: 99.5 (19 Sep 2023 21:59)  HR: 84 (20 Sep 2023 06:00) (78 - 85)  BP: 126/60 (20 Sep 2023 06:00) (112/59 - 164/73)  BP(mean): 85 (20 Sep 2023 06:00) (81 - 107)  RR: 23 (20 Sep 2023 06:00) (16 - 33)  SpO2: 94% (20 Sep 2023 06:00) (93% - 100%)    Parameters below as of 20 Sep 2023 06:00  Patient On (Oxygen Delivery Method): room air        Physical Exam:  GENERAL: NAD, resting comfortably in bed  HEENT: NCAT, MMM  C/V: Normal rate, normal peripheral perfusion, no murmurs  PULM: Nonlabored breathing, no respiratory distress in room aur  ABD: Soft, ND, NT, no rebound tenderness, no guarding. Wet-to-dry dressing over wound clean with minimal stool staining. Wound bed clean-appearing. Ileostomy with minimal output (25 cc), perc cony with bilious output (130/410 cc). JOYCE in LUQ with no output   : Voiding spontaneously and adequately   EXTREM: WWP, no edema, SCDs in place  NEURO: No focal deficits      I&O's Summary    18 Sep 2023 07:01  -  19 Sep 2023 07:00  --------------------------------------------------------  IN: 3079.8 mL / OUT: 2145 mL / NET: 934.8 mL    19 Sep 2023 07:01  -  20 Sep 2023 06:17  --------------------------------------------------------  IN: 3283.8 mL / OUT: 2295 mL / NET: 988.8 mL        LABS:                        9.0    9.26  )-----------( 276      ( 19 Sep 2023 05:35 )             28.3     09-19    136  |  102  |  41<H>  ----------------------------<  104<H>  4.0   |  26  |  1.21    Ca    8.7      19 Sep 2023 05:35  Phos  3.7     09-19  Mg     2.0     09-19        Urinalysis Basic - ( 19 Sep 2023 05:35 )    Color: x / Appearance: x / SG: x / pH: x  Gluc: 104 mg/dL / Ketone: x  / Bili: x / Urobili: x   Blood: x / Protein: x / Nitrite: x   Leuk Esterase: x / RBC: x / WBC x   Sq Epi: x / Non Sq Epi: x / Bacteria: x      CAPILLARY BLOOD GLUCOSE      POCT Blood Glucose.: 106 mg/dL (19 Sep 2023 23:05)  POCT Blood Glucose.: 92 mg/dL (19 Sep 2023 18:08)  POCT Blood Glucose.: 73 mg/dL (19 Sep 2023 17:24)  POCT Blood Glucose.: 95 mg/dL (19 Sep 2023 11:36)        RADIOLOGY & ADDITIONAL STUDIES:   SUBJECTIVE:   Patient seen and examined at bedside this AM; mentating appropriately and in no acute distress   Nausea and vomiting overnight. CT A/P done   Required frequent dressing changes (about Q3) yesterday since starting clear liquid. JOYCE drain output simultaneously increased   Slight drop in urine output noted   No abdominal pain, ongoing nausea, or chills this AM      MEDICATIONS  (STANDING):  chlorhexidine 2% Cloths 1 Application(s) Topical <User Schedule>  cholestyramine Powder (Sugar-Free) 4 Gram(s) Oral two times a day  dextrose 5%. 1000 milliLiter(s) (100 mL/Hr) IV Continuous <Continuous>  dextrose 5%. 1000 milliLiter(s) (50 mL/Hr) IV Continuous <Continuous>  dextrose 50% Injectable 25 Gram(s) IV Push once  dextrose 50% Injectable 12.5 Gram(s) IV Push once  dextrose 50% Injectable 25 Gram(s) IV Push once  glucagon  Injectable 1 milliGRAM(s) IntraMuscular once  heparin   Injectable 5000 Unit(s) SubCutaneous every 8 hours  insulin lispro (ADMELOG) corrective regimen sliding scale   SubCutaneous every 6 hours  levothyroxine 50 MICROGram(s) Oral daily  lipid, fat emulsion (Fish Oil and Plant Based) 20% Infusion 1 Gm/kG/Day (41.67 mL/Hr) IV Continuous <Continuous>  loperamide 4 milliGRAM(s) Oral every 6 hours  melatonin 5 milliGRAM(s) Oral at bedtime  metoprolol tartrate 25 milliGRAM(s) Oral every 12 hours  nystatin Powder 1 Application(s) Topical three times a day  pantoprazole    Tablet 40 milliGRAM(s) Oral before breakfast  Parenteral Nutrition - Adult 1 Each (83 mL/Hr) TPN Continuous <Continuous>    MEDICATIONS  (PRN):  benzocaine/menthol Lozenge 2 Lozenge Oral every 4 hours PRN Sore Throat  dextrose Oral Gel 15 Gram(s) Oral once PRN Blood Glucose LESS THAN 70 milliGRAM(s)/deciliter  meclizine 25 milliGRAM(s) Oral every 6 hours PRN Nausea  ondansetron Injectable 4 milliGRAM(s) IV Push every 8 hours PRN Nausea and/or Vomiting      Vital Signs Last 24 Hrs  T(C): 36.9 (20 Sep 2023 01:17), Max: 37.5 (19 Sep 2023 21:59)  T(F): 98.4 (20 Sep 2023 01:17), Max: 99.5 (19 Sep 2023 21:59)  HR: 84 (20 Sep 2023 06:00) (78 - 85)  BP: 126/60 (20 Sep 2023 06:00) (112/59 - 164/73)  BP(mean): 85 (20 Sep 2023 06:00) (81 - 107)  RR: 23 (20 Sep 2023 06:00) (16 - 33)  SpO2: 94% (20 Sep 2023 06:00) (93% - 100%)    Parameters below as of 20 Sep 2023 06:00  Patient On (Oxygen Delivery Method): room air        Physical Exam:  GENERAL: NAD, resting comfortably in bed  HEENT: NCAT, MMM  C/V: Normal rate, normal peripheral perfusion, no murmurs  PULM: Nonlabored breathing, no respiratory distress in room aur  ABD: Soft, ND, NT, no rebound tenderness, no guarding. Wet-to-dry dressing over wound clean with minimal stool staining. Wound bed clean-appearing. Ileostomy with minimal output (25 cc), perc cony with bilious output (130/410 cc). JOYCE in LUQ with no output   : Voiding spontaneously and adequately   EXTREM: WWP, no edema, SCDs in place  NEURO: No focal deficits      I&O's Summary    18 Sep 2023 07:01  -  19 Sep 2023 07:00  --------------------------------------------------------  IN: 3079.8 mL / OUT: 2145 mL / NET: 934.8 mL    19 Sep 2023 07:01  -  20 Sep 2023 06:17  --------------------------------------------------------  IN: 3283.8 mL / OUT: 2295 mL / NET: 988.8 mL        LABS:                        9.0    9.26  )-----------( 276      ( 19 Sep 2023 05:35 )             28.3     09-19    136  |  102  |  41<H>  ----------------------------<  104<H>  4.0   |  26  |  1.21    Ca    8.7      19 Sep 2023 05:35  Phos  3.7     09-19  Mg     2.0     09-19        Urinalysis Basic - ( 19 Sep 2023 05:35 )    Color: x / Appearance: x / SG: x / pH: x  Gluc: 104 mg/dL / Ketone: x  / Bili: x / Urobili: x   Blood: x / Protein: x / Nitrite: x   Leuk Esterase: x / RBC: x / WBC x   Sq Epi: x / Non Sq Epi: x / Bacteria: x      CAPILLARY BLOOD GLUCOSE      POCT Blood Glucose.: 106 mg/dL (19 Sep 2023 23:05)  POCT Blood Glucose.: 92 mg/dL (19 Sep 2023 18:08)  POCT Blood Glucose.: 73 mg/dL (19 Sep 2023 17:24)  POCT Blood Glucose.: 95 mg/dL (19 Sep 2023 11:36)        RADIOLOGY & ADDITIONAL STUDIES:   08-Apr-2024 10:02

## 2024-04-13 NOTE — ED PROVIDER NOTE - PROGRESS NOTE DETAILS
Patient signed out with plan to complete 2nd unit of blood after which he will be discharged. Patient is feeling well.  Transfusion infusing.  Transportation set and aide coming to meet him in the ER to accompany him home.

## 2024-04-13 NOTE — ED PROVIDER NOTE - PATIENT'S PREFERRED PRONOUN
-We did pap today!  -Get labs  -I recommend new weight watchers program  -Limit alcohol to no more than 7 per week  -Chantix  -I'll get back to you about chest xray results.         Preventive Health Recommendations  Female Ages 40 to 49    Yearly exam:     See your health care provider every year in order to  1. Review health changes.   2. Discuss preventive care.    3. Review your medicines if your doctor prescribed any.      Get a Pap test every three years (unless you have an abnormal result and your provider advises testing more often).      If you get Pap tests with HPV test, you only need to test every 5 years, unless you have an abnormal result. You do not need a Pap test if your uterus was removed (hysterectomy) and you have not had cancer.      You should be tested each year for STDs (sexually transmitted diseases), if you're at risk.       Ask your doctor if you should have a mammogram.      Have a colonoscopy (test for colon cancer) if someone in your family has had colon cancer or polyps before age 50.       Have a cholesterol test every 5 years.       Have a diabetes test (fasting glucose) after age 45. If you are at risk for diabetes, you should have this test every 3 years.    Shots: Get a flu shot each year. Get a tetanus shot every 10 years.     Nutrition:     Eat at least 5 servings of fruits and vegetables each day.    Eat whole-grain bread, whole-wheat pasta and brown rice instead of white grains and rice.    Talk to your provider about Calcium and Vitamin D.     Lifestyle    Exercise at least 150 minutes a week (an average of 30 minutes a day, 5 days a week). This will help you control your weight and prevent disease.    Limit alcohol to one drink per day.    No smoking.     Wear sunscreen to prevent skin cancer.    See your dentist every six months for an exam and cleaning.  
Him/He

## 2024-04-13 NOTE — ED PROVIDER NOTE - NSFOLLOWUPINSTRUCTIONS_ED_ALL_ED_FT
Continue your regularly prescribed medications.  Follow up with Dr. Aguiar as scheduled.  Return to ED with any worsening bleeding, weakness, other concerns.    Anemia    WHAT YOU NEED TO KNOW:    Anemia is a low number of red blood cells or a low amount of hemoglobin in your red blood cells. Hemoglobin is a protein that helps carry oxygen throughout your body. Red blood cells use iron to create hemoglobin. Anemia may develop if your body does not have enough iron. It may also develop if your body does not make enough red blood cells or they die faster than your body can make them.    DISCHARGE INSTRUCTIONS:    Call your local emergency number (911 in the [place]), or have someone call if:    You lose consciousness.    You have severe chest pain.  Seek care immediately if:    You have dark or bloody bowel movements.    Call your doctor if:    Your symptoms are worse, even after treatment.    You have questions or concerns about your condition or care.  Medicines:    Iron or folic acid supplements help increase your red blood cell and hemoglobin levels.    Vitamin B12 injections may help boost your red blood cell level and decrease your symptoms. Ask your healthcare provider how to inject B12.    Take your medicine as directed. Contact your healthcare provider if you think your medicine is not helping or if you have side effects. Tell your provider if you are allergic to any medicine. Keep a list of the medicines, vitamins, and herbs you take. Include the amounts, and when and why you take them. Bring the list or the pill bottles to follow-up visits. Carry your medicine list with you in case of an emergency.  Prevent anemia: Eat healthy foods rich in iron and vitamin C. Nuts, meat, dark leafy green vegetables, and beans are high in iron and protein. Vitamin C helps your body absorb iron. Foods rich in vitamin C include oranges and other citrus fruits. Ask your healthcare provider for a list of other foods that are high in iron or vitamin C. Ask if you need to be on a special diet.  Sources of Iron  Sources of Vitamin C    Follow up with your doctor as directed: Write down your questions so you remember to ask them during your visits.    © [employer] 1973, 2024

## 2024-04-13 NOTE — ED ADULT TRIAGE NOTE - CHIEF COMPLAINT QUOTE
Pt presents to ED by EMS C/O low hemoglobin of 7.7 at home. Pt on 3 L NC protecting own airway, appears jaundice on arrival, awake and alert. Hx anemia with multiple blood transfusions, ileostomy with bleeding stoma. Pt denies bleeding at this time.

## 2024-04-13 NOTE — ED PROVIDER NOTE - CLINICAL SUMMARY MEDICAL DECISION MAKING FREE TEXT BOX
Patient is a 77yo M with PMH of AFib/Flutter s/p DCCV, Crohn's, prior ileocectomy and open appendectomy, extensive abdominal surgeries and ileostomy, short-bowel syndrome, hypothyroidism, depression, recently seen at the Emergency Department for blood transfusion and multiple recent admissions for transfusions, recent admission from 3/28-4/5 for increasing O2 requirements found to be COVID positive now presents from home for anemia, fatigue.  Outpt hgb 7.6.  Pt states bleeding from ostomy has decreased.  Pt without other complaints of fever or chills.  Dr. Aguiar at bedside also confirms plan for transfusion, epogen and dc.    VSS  POCUS US IV placed  Labs show hgb 7.8  Received 1 unit rbc, repeat 8.2 will give another unit  Epogen given in ED  Discussed with Dr. Aguiar

## 2024-04-13 NOTE — ED PROVIDER NOTE - PATIENT PORTAL LINK FT
You can access the FollowMyHealth Patient Portal offered by [facility] by registering at the following website: [email or web address]. By joining [facility]’s FollowMyHealth portal, you will also be able to view your health information using other applications (apps) compatible with our system.

## 2024-04-13 NOTE — ED ADULT NURSE REASSESSMENT NOTE - NS ED NURSE REASSESS COMMENT FT1
Pt NAD, breathing equal and unlabored at this time. 550cc output from L ostomy bag. Awaiting 1U PRBC for transfusion.

## 2024-04-13 NOTE — ED ADULT NURSE NOTE - OBJECTIVE STATEMENT
78y Male A&ox3 to ED c/o abnormal labs. Pt reports has hemoglobin 7.7. Pt reports generalized weakness. Noted to be Jaundice. Hx of left ostomy bag. Reports bleeding from Ostomy in past and requiring multiple blood transfusions. Denies chest pain, n/v/d. 78y Male A&ox3 to ED c/o abnormal labs. Pt reports has hemoglobin 7.7. Pt reports generalized weakness. Noted to be Jaundice. Hx of left lower abd ostomy and right upper quadrant ostomy bag. Reports bleeding from Ostomy in past and requiring multiple blood transfusions. Denies chest pain, n/v/d.
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
## 2024-04-13 NOTE — ED ADULT NURSE REASSESSMENT NOTE - NS ED NURSE REASSESS COMMENT FT1
Patient to receive blood transfusion. Labs reviewed. Patient consent in chart. Patient educated on possible signs of blood transfusion and informed to notify staff if patient were to develop any severe respiratory distress, flank pain, or any other major concerns. Patient with large bore IV access sites. Patient to receive VS per policy and procedure. Blood product checked with second RN.

## 2024-04-13 NOTE — ED PROVIDER NOTE - OBJECTIVE STATEMENT
Patient is a 77yo M with PMH of AFib/Flutter s/p DCCV, Crohn's, prior ileocectomy and open appendectomy, extensive abdominal surgeries and ileostomy, short-bowel syndrome, hypothyroidism, depression, recently seen at the Emergency Department for blood transfusion and multiple recent admissions for transfusions, recent admission from 3/28-4/5 for increasing O2 requirements found to be COVID positive now presents from home for anemia, fatigue.  Outpt hgb 7.6.  Pt states bleeding from ostomy has decreased. Patient is a 77yo M with PMH of AFib/Flutter s/p DCCV, Crohn's, prior ileocectomy and open appendectomy, extensive abdominal surgeries and ileostomy, short-bowel syndrome, hypothyroidism, depression, recently seen at the Emergency Department for blood transfusion and multiple recent admissions for transfusions, recent admission from 3/28-4/5 for increasing O2 requirements found to be COVID positive now presents from home for anemia, fatigue.  Outpt hgb 7.6.  Pt states bleeding from ostomy has decreased.  Pt without other complaints of fever or chills.  Dr. Aguiar at bedside also confirms plan for transfusion, epogen and dc.

## 2024-04-16 NOTE — BRIEF OPERATIVE NOTE - NSICDXBRIEFPREOP_GEN_ALL_CORE_FT
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

## 2024-04-16 NOTE — H&P ADULT - HISTORY OF PRESENT ILLNESS
IR: Dr. Zhao   Pharmacy: [facility]   Escort: [facility] [phone]     77yo M with PMH of AFib/Flutter (no AC given ongoing GIB) s/p DCCV, Crohn's, prior ileocectomy and open appendectomy, extensive abdominal surgeries and ileostomy, short-bowel syndrome, hypothyroidism, depression, multiple visits to ED for blood transfusions most recently on 4/13/24 requiring 2u pRBC and multiple admissions for transfusion most recently directly admitted on 03/28-04/05 with course complicated by COVID/HAP now presents for diagnostic angiogram with Dr. Zhao. At this time patient endorses ongoing bleeding from ostomy and feeling fatigue, weak, and  GABRIEL with minimal exertion which he attributes to anemia. Patient denies fever, chills, cough, CP, palpitations, dizziness, lightheadedness,  syncope, abd pain, N/V, LE swelling, falls, hematuria, BRBPR, or recent travel.     TTE 04/02/24: LVEF 65-70%, septal knuckle w/o evidence of LV outflow obstruction, no RWMA, aortic sclerosis w/o significant stenosis, PASP 42 mmHg, L pleural effusion, and trivial pericardial effusion.     In light of pts risk factors and ongoing GIB patient now presents to [facility] fo recommended diagnostic angiogram and venogram and possible embolization if clinically indicated.

## 2024-04-16 NOTE — BRIEF OPERATIVE NOTE - OPERATION/FINDINGS
Patient brought to the OR, prepped and draped. Ostomy appliance taken down. Some pin-point bleeding from 6 o'clock position of wound. Right groin access via 5 Fr sheath. SMA cannulated w/ glide wire and CB2. SMA antiogram w/o extravasation. Left inferior epigastric artery angiogram performed w/ some hyperemia/haziness over area which when marked with clamp corresponded with bleeding location along the inferior portion of the wound bed. This area of concern arose from the left inferior epigastric artery and was better demonstrated with selective angiograms via microcathetheter placed in left inferior epigastric artery. Distal outflow of this artery embolized with coils. Proximal inflow embolized with 0.2cc nBCA glue. On completion, no filling of hazy areas. Angiograms performed of the celiac and right iliac system did not show any branches feeding the area of concern. Following this, areas of ongoing venous oozing were addressed with surgiflow injections, 4-0 monocryl stitch, and silver nitrate. Catheters removed and pressure held 23 minutes. At conclusion of case, woundcare team reapplied ostomy appliance.     163cc contrast

## 2024-04-16 NOTE — PATIENT PROFILE ADULT - FALL HARM RISK - HARM RISK INTERVENTIONS
Assistance with ambulation/Assistance OOB with selected safe patient handling equipment/Communicate Risk of Fall with Harm to all staff/Discuss with provider need for PT consult/Monitor gait and stability/Provide patient with walking aids - walker, cane, crutches/Reinforce activity limits and safety measures with patient and family/Sit up slowly, dangle for a short time, stand at bedside before walking/Tailored Fall Risk Interventions/Visual Cue: Yellow wristband and red socks/Bed in lowest position, wheels locked, appropriate side rails in place/Call bell, personal items and telephone in reach/Instruct patient to call for assistance before getting out of bed or chair/Non-slip footwear when patient is out of bed/[place] to call system/Physically safe environment - no spills, clutter or unnecessary equipment/Purposeful Proactive Rounding/Room/bathroom lighting operational, light cord in reach

## 2024-04-16 NOTE — CONSULT NOTE ADULT - ASSESSMENT
ASSESSMENT/ PLAN:  78M    Neuro:  HEENT:  CV:  Pulm:   ASSESSMENT/ PLAN:  78M PMHx Crohn's, AFib/Flutter s/p DCCVs in past, remote ileocectomy and open appendectomy, admitted (6/23) for SBO vs Crohns flare requiring lap converted to open TRE, SBR x 3, left in discontinuity with abthera vac on (6/27), RTOR for ileocolic resection, small bowel anastomosis, and abdominal wall closure on (6/28), c/b fluid collection s/p IR aspiration of perihepatic fluid on (7/3), c/b wound dehiscence s/p RTOR exlap, washout, ileocolic resection  (7/5). With long and complicated hospital course due to periods of severe ELVI and hyponatremia due to dehydration, but stepped back up for to SICU on (9/10) for acute AMS, intermittent hypoglycemia, AFib with RVR. Percutaneous Cholecystostomy placed on (9/11) for enlarged GB, PCT capped 10/5 and uncapped 10/25 for hyperbilirubinemia, Right brachial DVT, left basillic and cephalic superficial thrombus on duplex 11/2, CT 11/14 with ALDEN ground glass opacity of unclear etiology, completed empiric 7day cefepime course 11/22, and another course of 7day cefepime for PNA (1/15-23), hyperbilirubinemia of unclear etiology, resolved candida glabrata fungemia, ELVI resolved, second episode of sinus pause--pacemaker placed (2/5). Perc cony removed 2/8. Discharged home 2/12/24. Patient with numerous ED presentations prior for bleeding at site of granulation tissue requiring transfusions and most recent presentation for bleeding c/b COVID PNA requiring 1 week hospital admission (discharged 4/5) who re-presents to [facility] for elective angiogram/venogram with plans to embolize vessel feeding into granulation tissue. On 4/16, patient underwent embolization of a branch of the inferior epigastric artery. Intraoperative requires vasopressor support to maintain MAP > 65. Transferred to SICU post op.    Neuro: Pain control PRN with Tylenol. Nausea control PRN with zofran  HEENT: Hx of hypothyroidism - continue home synthroid  CV: Goal maintain MAP >65. Likely vasoplegia in setting recent anesthesia/procedure requiring levophed, will wean as tolerated. Hx of AFib/Flutter s/p DCCVs, as well as sinus pauses s/p pacemaker (2/5).   Pulm: Goal maintain saturation > 94%. Recent admission for COVID PNA, f/u CXR. NC PRN.   GI: Regular diet after lying flat. Hx of Crohn's SBO s/p ex lap with numerous SBR and RTOR for dehiscence c/b entero-atmospheric fistula. Numerous episodes   : Voids.   ID: Antibiotics currently not indicated  Endo: mISS  Heme/PPx: SCDs/HSQ in AM  Wounds/Lines/Drains: Ileostomy, Fistula  PT/OT: Ordered  Dispo: SICU   ASSESSMENT/ PLAN:  78M PMHx Crohn's, AFib/Flutter s/p DCCVs in past, remote ileocectomy and open appendectomy, admitted (6/23) for SBO vs Crohns flare requiring lap converted to open TRE, SBR x 3, left in discontinuity with abthera vac on (6/27), RTOR for ileocolic resection, small bowel anastomosis, and abdominal wall closure on (6/28), c/b fluid collection s/p IR aspiration of perihepatic fluid on (7/3), c/b wound dehiscence s/p RTOR exlap, washout, ileocolic resection  (7/5). With long and complicated hospital course due to periods of severe ELVI and hyponatremia due to dehydration, but stepped back up for to SICU on (9/10) for acute AMS, intermittent hypoglycemia, AFib with RVR. Percutaneous Cholecystostomy placed on (9/11) for enlarged GB, PCT capped 10/5 and uncapped 10/25 for hyperbilirubinemia, Right brachial DVT, left basillic and cephalic superficial thrombus on duplex 11/2, CT 11/14 with ALDEN ground glass opacity of unclear etiology, completed empiric 7day cefepime course 11/22, and another course of 7day cefepime for PNA (1/15-23), hyperbilirubinemia of unclear etiology, resolved candida glabrata fungemia, ELVI resolved, second episode of sinus pause--pacemaker placed (2/5). Perc cony removed 2/8. Discharged home 2/12/24. Patient with numerous ED presentations prior for bleeding at site of granulation tissue requiring transfusions and most recent presentation for bleeding c/b COVID PNA requiring 1 week hospital admission (discharged 4/5) who re-p[facility] to [facility] for elective angiogram/venogram with plans to embolize vessel feeding into granulation tissue. On 4/16, patient underwent embolization of a branch of the inferior epigastric artery. Intraoperative requires vasopressor support to maintain MAP > 65. Transferred to SICU post op.    Neuro: Pain control PRN with Tylenol. Nausea control PRN with Zofran. Hx of depression - continue home wellbutrin.   HEENT: Hx of hypothyroidism - continue home synthroid  CV: Goal maintain MAP >65. Likely vasoplegia in setting recent anesthesia/procedure requiring levophed, will wean as tolerated. Hx of AFib/Flutter s/p DCCVs, as well as sinus pauses s/p pacemaker (2/5) - holding lopressor given requiring pressors. Hx of HLD - continue rosuvastatin.   Pulm: Goal maintain saturation > 94%. Recent admission for COVID PNA, f/u CXR. NC PRN.   GI: Regular diet after lying flat. Hx of Crohn's SBO s/p ex lap with numerous SBR and RTOR for dehiscence c/b entero-atmospheric fistula. Numerous episodes   : Voids.   ID: Antibiotics currently not indicated  Endo: mISS  Heme/PPx: SCDs/HSQ in AM  Wounds/Lines/Drains: Ileostomy, Fistula  PT/OT: Ordered  Dispo: SICU   ASSESSMENT/ PLAN:  78M PMHx Crohn's, AFib/Flutter s/p DCCVs in past, remote ileocectomy and open appendectomy, admitted (6/23) for SBO vs Crohns flare requiring lap converted to open TRE, SBR x 3, left in discontinuity with abthera vac on (6/27), RTOR for ileocolic resection, small bowel anastomosis, and abdominal wall closure on (6/28), c/b fluid collection s/p IR aspiration of perihepatic fluid on (7/3), c/b wound dehiscence s/p RTOR exlap, washout, ileocolic resection  (7/5). With long and complicated hospital course due to periods of severe ELVI and hyponatremia due to dehydration, but stepped back up for to SICU on (9/10) for acute AMS, intermittent hypoglycemia, AFib with RVR. Percutaneous Cholecystostomy placed on (9/11) for enlarged GB, PCT capped 10/5 and uncapped 10/25 for hyperbilirubinemia, Right brachial DVT, left basillic and cephalic superficial thrombus on duplex 11/2, CT 11/14 with ALDEN ground glass opacity of unclear etiology, completed empiric 7day cefepime course 11/22, and another course of 7day cefepime for PNA (1/15-23), hyperbilirubinemia of unclear etiology, resolved candida glabrata fungemia, ELVI resolved, second episode of sinus pause--pacemaker placed (2/5). Perc cony removed 2/8. Discharged home 2/12/24. Patient with numerous ED presentations prior for bleeding at site of granulation tissue requiring transfusions and most recent presentation for bleeding c/b COVID PNA requiring 1 week hospital admission (discharged 4/5) who re-presents to [facility] for elective angiogram/venogram with plans to embolize vessel feeding into granulation tissue. On 4/16, patient underwent embolization of a branch of the inferior epigastric artery (160 cc contrast, , IVF 1.5 L). Intraoperative required vasopressor support to maintain MAP > 65. Transferred to SICU post op.    Neuro: Pain control PRN with Tylenol. Nausea control PRN with Zofran. Hx of depression - continue home Wellbutrin   HEENT: No concerns  CV: Goal maintain MAP >65. Likely vasoplegia in setting recent anesthesia/procedure requiring levophed, will wean as tolerated. Hx of AFib/Flutter s/p DCCVs, as well as sinus pauses s/p pacemaker (2/5) - holding lopressor given requiring pressors. Hx of HLD - continue rosuvastatin.   Pulm: Goal maintain saturation > 94%. Recent admission for COVID PNA, f/u CXR. NC PRN. No hx of recent steroid use.  GI: Regular diet after lying flat. Hx of Crohn's SBO s/p ex lap with numerous SBR and RTOR for dehiscence c/b entero-atmospheric fistula. Numerous episodes   : Voids. Condom cath for strict Is and Os  ID: s/p perioperative Ancef (4/16)  Endo: mISS. Hx of hypothyroidism - continue home synthroid.  Heme/PPx: SCDs/HSQ in AM  Wounds/Lines/Drains: Ileostomy, Fistula, RUE PICC  PT/OT: Ordered  Dispo: SICU

## 2024-04-16 NOTE — CONSULT NOTE ADULT - ATTENDING COMMENTS
79 yo M with multiple complex prior hospitalizations for complications 2/2 SBO vs Crohn's requiring open TRE, SBR x 3, ileocolic resection and SB anatamosis, further complications of wound dehiscence and ileocolic resection, entero-atmospheric fistula, recurrent infections, ELVI, and recent covid PNA, on TPN and oral nutrition, discharged home 4/5 but with ongoing blood loss anemia from oozing at ostomy and readmitted for planned angiogram/venogram for embolization now s/p embolization of inferior epigastric artery c/b hypotension after induction, persistent hypotension post operatively likely related to vasoplegia and possible hypovolemia/anemia. Awake, alert, WWP, no edema, no pain, bleeding has now resolved. Remains on low dose levo to maintain MAP > 65. Plan to repeat labs including lactate, renal function, CBC, TSH, strict I/O's and monitor UOP, c/w IVF, hold home beta blocker for now as AF rate controlled and pt hypotensive. CXR post extubation. Low suspicion for ongoing infection, monitor for fever/leukocytosis. Admit to SICU.

## 2024-04-16 NOTE — H&P ADULT - NSHPLABSRESULTS_GEN_ALL_CORE
EK BPM no acute ischemic changes QTc 471 ms  CXR: Small right pleural effusion. No lung consolidation. No pneumothorax.  Right upper extremity venous catheter tip at superior vena cava. No pneumothorax. No acute bone abnormality.    TTE 24:   1. Technically difficult study.   2. Normal left ventricular size and systolic function.   3. Mild symmetric left ventricular hypertrophy.   4. Normal right ventricular size and systolic function.   5. Severely dilated right atrium.   6. Aortic sclerosis without significant stenosis.   7. Mild aortic regurgitation.   8. Pulmonary hypertension present, pulmonary artery systolic pressure is 42 mmHg.   9. Trivial pericardial effusion.  10. Left pleural effusion.                          8.4    12.44 )-----------( 141      ( 2024 10:35 )             25.9       PT/INR - ( 2024 10:35 )   PT: 14.5 sec;   INR: 1.28        PTT - ( 2024 10:35 )  PTT:34.1 sec

## 2024-04-16 NOTE — CONSULT NOTE ADULT - SUBJECTIVE AND OBJECTIVE BOX
SICU CONSULT NOTE    HPI:  79yo M with PMH of AFib/Flutter (no AC given ongoing GIB) s/p DCCV, Crohn's, prior ileocectomy and open appendectomy, extensive abdominal surgeries and ileostomy, short-bowel syndrome, hypothyroidism, depression, multiple visits to ED for blood transfusions most recently on 4/13/24 requiring 2u pRBC and multiple admissions for transfusion most recently directly admitted on 03/28-04/05 with course complicated by COVID/HAP now presents for diagnostic angiogram with Dr. Zhao. At this time patient endorses ongoing bleeding from ostomy and feeling fatigue, weak, and  GABRIEL with minimal exertion which he attributes to anemia. Patient denies fever, chills, cough, CP, palpitations, dizziness, lightheadedness,  syncope, abd pain, N/V, LE swelling, falls, hematuria, BRBPR, or recent travel.     TTE 04/02/24: LVEF 65-70%, septal knuckle w/o evidence of LV outflow obstruction, no RWMA, aortic sclerosis w/o significant stenosis, PASP 42 mmHg, L pleural effusion, and trivial pericardial effusion.     In light of pts risk factors and ongoing GIB patient now presents to [facility] fo recommended diagnostic angiogram and venogram and possible embolization if clinically indicated.    (16 Apr 2024 10:29)    SICU ADDENDUM:  78M    PAST MEDICAL & SURGICAL HISTORY:  Essential hypertension  Hypertension      Atrial fibrillation  s/p cardioversion 2010 and 2014  Pt. reports 4 DCCV  Now on Amiodarone 200mg PO bid and Eliquis 5mg PO bid  Last DCCV 4 yrs ago at [facility]      Crohn's disease  s/p partial resection of ileum      Hyperlipidemia      Hypothyroidism      History of depression  On Venlafaxine ER 150mg PO bid      Junctional rhythm      Bradycardia      H/O knee surgery      History of cataract surgery      S/P appendectomy        Home Meds: Home Medications:  allopurinol 300 mg oral tablet: 1 tab(s) orally once a day (16 Apr 2024 10:54)  Lopressor 50 mg oral tablet: 1 tab(s) orally once a day (16 Apr 2024 10:54)  rosuvastatin 5 mg oral tablet: 1 tab(s) orally once a day (16 Apr 2024 10:56)  venlafaxine 150 mg oral tablet, extended release: 2 tab(s) orally once a day (16 Apr 2024 10:54)    Allergies: Allergies    penicillin (Angioedema)    Intolerances      Soc:   Advanced Directives: Presumed Full Code     CURRENT MEDICATIONS:   --------------------------------------------------------------------------------------  Neurologic Medications    Respiratory Medications    Cardiovascular Medications    Gastrointestinal Medications    Genitourinary Medications    Hematologic/Oncologic Medications    Antimicrobial/Immunologic Medications    Endocrine/Metabolic Medications    Topical/Other Medications    --------------------------------------------------------------------------------------    VITAL SIGNS, INS/OUTS (last 24 hours):  --------------------------------------------------------------------------------------  ICU Vital Signs Last 24 Hrs  T(C): 36.5 (16 Apr 2024 11:29), Max: 36.5 (16 Apr 2024 10:29)  T(F): 97.7 (16 Apr 2024 10:29), Max: 97.7 (16 Apr 2024 10:29)  HR: 103 (16 Apr 2024 11:29) (103 - 103)  BP: 111/58 (16 Apr 2024 11:29) (111/58 - 111/58)  BP(mean): 75 (16 Apr 2024 10:29) (75 - 75)  ABP: --  ABP(mean): --  RR: 18 (16 Apr 2024 11:29) (18 - 18)  SpO2: 96% (16 Apr 2024 11:29) (96% - 96%)    O2 Parameters below as of 16 Apr 2024 10:29  Patient On (Oxygen Delivery Method): room air          I&O's Summary    16 Apr 2024 07:01  -  16 Apr 2024 15:47  --------------------------------------------------------  IN: 375 mL / OUT: 200 mL / NET: 175 mL      --------------------------------------------------------------------------------------    EXAM:      LABS  --------------------------------------------------------------------------------------  Labs:  CAPILLARY BLOOD GLUCOSE                              8.4    12.44 )-----------( 141      ( 16 Apr 2024 10:35 )             25.9       Auto Neutrophil %: 82.4 % (04-16-24 @ 10:35)  Auto Immature Granulocyte %: 0.5 % (04-16-24 @ 10:35)    04-16    141  |  102  |  53<H>  ----------------------------<  111<H>  4.2   |  30  |  1.48<H>      Calcium: 7.9 mg/dL (04-16-24 @ 10:35)      LFTs:         Coags:     14.5   ----< 1.28    ( 16 Apr 2024 10:35 )     34.1                Urinalysis Basic - ( 16 Apr 2024 10:35 )    Color: x / Appearance: x / SG: x / pH: x  Gluc: 111 mg/dL / Ketone: x  / Bili: x / Urobili: x   Blood: x / Protein: x / Nitrite: x   Leuk Esterase: x / RBC: x / WBC x   Sq Epi: x / Non Sq Epi: x / Bacteria: x           SICU CONSULT NOTE    HPI:  77yo M with PMH of AFib/Flutter (no AC given ongoing GIB) s/p DCCV, Crohn's, prior ileocectomy and open appendectomy, extensive abdominal surgeries and ileostomy, short-bowel syndrome, hypothyroidism, depression, multiple visits to ED for blood transfusions most recently on 4/13/24 requiring 2u pRBC and multiple admissions for transfusion most recently directly admitted on 03/28-04/05 with course complicated by COVID/HAP now presents for diagnostic angiogram with Dr. Zhao. At this time patient endorses ongoing bleeding from ostomy and feeling fatigue, weak, and  GABRIEL with minimal exertion which he attributes to anemia. Patient denies fever, chills, cough, CP, palpitations, dizziness, lightheadedness,  syncope, abd pain, N/V, LE swelling, falls, hematuria, BRBPR, or recent travel.     TTE 04/02/24: LVEF 65-70%, septal knuckle w/o evidence of LV outflow obstruction, no RWMA, aortic sclerosis w/o significant stenosis, PASP 42 mmHg, L pleural effusion, and trivial pericardial effusion.     In light of pts risk factors and ongoing GIB patient now presents to [facility] fo recommended diagnostic angiogram and venogram and possible embolization if clinically indicated.    (16 Apr 2024 10:29)    SICU ADDENDUM:  78M PMHx Crohn's, AFib/Flutter s/p DCCVs on amiodarone, remote ileocectomy and open appendectomy. Admitted (6/23) for SBO vs Crohns flare, s/p NGT decompression and s/p lap converted to open TRE, SBR x 3, left in discontinuity with abthera vac on (6/27), RTOR for ileocolic resection, small bowel anastomosis, and abdominal wall closure on (6/28), c/b fluid collection s/p IR aspiration of perihepatic fluid on (7/3), c/b wound dehiscence s/p RTOR exlap, washout, ileocolic resection  (7/5) transferred to SICU postoperatively for hemodynamic monitoring, with hospital course complicated by periods of severe ELVI and hyponatremia, which resolved but stepped back up for to SICU on (9/10) for acute AMS, intermittent hypoglycemia, AFib with RVR. Percutaneous Cholecystostomy placed on (9/11) for enlarged GB, PCT capped 10/5 and uncapped 10/25 for hyperbilirubinemia, Right brachial DVT, left basillic and cephalic superficial thrombus on duplex 11/2, CT 11/14 with ALDEN ground glass opacity of unclear etiology, completed empiric 7day cefepime course 11/22, and another course of 7day cefepime for PNA  (1/15-23), hyperbilirubinemia of unclear etiology, resolved candida glabrata fungemia, ELVI resolved, second episode of sinus pause--pacemaker placed (2/5). cystic duct open, Perc cony removed 2/8. Discharged home 2/12/24. Patient with numerous ED presentations prior for bleeding at site of granulation tissue requiring transfusions and most recent presentation for bleeding c/b COVID PNA requiring 1 week hospital admission (discharged 4/5) who re-presents to [facility] for elective angiogram/venogram with plans to embolize vessel feeding into granulation tissue. On 4/16, patient underwent embolization of a branch of the inferior epigastric artery. Intraoperative requires vasopressor support to maintain MAP > 65. Transferred to SICU post op.    PAST MEDICAL & SURGICAL HISTORY:  Essential hypertension  Hypertension      Atrial fibrillation  s/p cardioversion 2010 and 2014  Pt. reports 4 DCCV  Now on Amiodarone 200mg PO bid and Eliquis 5mg PO bid  Last DCCV 4 yrs ago at [facility]      Crohn's disease  s/p partial resection of ileum      Hyperlipidemia      Hypothyroidism      History of depression  On Venlafaxine ER 150mg PO bid      Junctional rhythm      Bradycardia      H/O knee surgery      History of cataract surgery      S/P appendectomy        Home Meds: Home Medications:  allopurinol 300 mg oral tablet: 1 tab(s) orally once a day (16 Apr 2024 10:54)  Lopressor 50 mg oral tablet: 1 tab(s) orally once a day (16 Apr 2024 10:54)  rosuvastatin 5 mg oral tablet: 1 tab(s) orally once a day (16 Apr 2024 10:56)  venlafaxine 150 mg oral tablet, extended release: 2 tab(s) orally once a day (16 Apr 2024 10:54)    Allergies: Allergies    penicillin (Angioedema)    Intolerances      Soc:   Advanced Directives: Presumed Full Code     CURRENT MEDICATIONS:   --------------------------------------------------------------------------------------  Neurologic Medications    Respiratory Medications    Cardiovascular Medications    Gastrointestinal Medications    Genitourinary Medications    Hematologic/Oncologic Medications    Antimicrobial/Immunologic Medications    Endocrine/Metabolic Medications    Topical/Other Medications    --------------------------------------------------------------------------------------    VITAL SIGNS, INS/OUTS (last 24 hours):  --------------------------------------------------------------------------------------  ICU Vital Signs Last 24 Hrs  T(C): 36.5 (16 Apr 2024 11:29), Max: 36.5 (16 Apr 2024 10:29)  T(F): 97.7 (16 Apr 2024 10:29), Max: 97.7 (16 Apr 2024 10:29)  HR: 103 (16 Apr 2024 11:29) (103 - 103)  BP: 111/58 (16 Apr 2024 11:29) (111/58 - 111/58)  BP(mean): 75 (16 Apr 2024 10:29) (75 - 75)  ABP: --  ABP(mean): --  RR: 18 (16 Apr 2024 11:29) (18 - 18)  SpO2: 96% (16 Apr 2024 11:29) (96% - 96%)    O2 Parameters below as of 16 Apr 2024 10:29  Patient On (Oxygen Delivery Method): room air          I&O's Summary    16 Apr 2024 07:01  -  16 Apr 2024 15:47  --------------------------------------------------------  IN: 375 mL / OUT: 200 mL / NET: 175 mL      --------------------------------------------------------------------------------------    EXAM:      LABS  --------------------------------------------------------------------------------------  Labs:  CAPILLARY BLOOD GLUCOSE                              8.4    12.44 )-----------( 141      ( 16 Apr 2024 10:35 )             25.9       Auto Neutrophil %: 82.4 % (04-16-24 @ 10:35)  Auto Immature Granulocyte %: 0.5 % (04-16-24 @ 10:35)    04-16    141  |  102  |  53<H>  ----------------------------<  111<H>  4.2   |  30  |  1.48<H>      Calcium: 7.9 mg/dL (04-16-24 @ 10:35)      LFTs:         Coags:     14.5   ----< 1.28    ( 16 Apr 2024 10:35 )     34.1                Urinalysis Basic - ( 16 Apr 2024 10:35 )    Color: x / Appearance: x / SG: x / pH: x  Gluc: 111 mg/dL / Ketone: x  / Bili: x / Urobili: x   Blood: x / Protein: x / Nitrite: x   Leuk Esterase: x / RBC: x / WBC x   Sq Epi: x / Non Sq Epi: x / Bacteria: x           SICU CONSULT NOTE    HPI:  79yo M with PMH of AFib/Flutter (no AC given ongoing GIB) s/p DCCV, Crohn's, prior ileocectomy and open appendectomy, extensive abdominal surgeries and ileostomy, short-bowel syndrome, hypothyroidism, depression, multiple visits to ED for blood transfusions most recently on 4/13/24 requiring 2u pRBC and multiple admissions for transfusion most recently directly admitted on 03/28-04/05 with course complicated by COVID/HAP now presents for diagnostic angiogram with Dr. Zhao. At this time patient endorses ongoing bleeding from ostomy and feeling fatigue, weak, and  GABRIEL with minimal exertion which he attributes to anemia. Patient denies fever, chills, cough, CP, palpitations, dizziness, lightheadedness,  syncope, abd pain, N/V, LE swelling, falls, hematuria, BRBPR, or recent travel.     TTE 04/02/24: LVEF 65-70%, septal knuckle w/o evidence of LV outflow obstruction, no RWMA, aortic sclerosis w/o significant stenosis, PASP 42 mmHg, L pleural effusion, and trivial pericardial effusion.     In light of pts risk factors and ongoing GIB patient now presents to [facility] fo recommended diagnostic angiogram and venogram and possible embolization if clinically indicated.    (16 Apr 2024 10:29)    SICU ADDENDUM:  78M PMHx Crohn's, AFib/Flutter s/p DCCVs on amiodarone, remote ileocectomy and open appendectomy. Admitted (6/23) for SBO vs Crohns flare, s/p NGT decompression and s/p lap converted to open TRE, SBR x 3, left in discontinuity with abthera vac on (6/27), RTOR for ileocolic resection, small bowel anastomosis, and abdominal wall closure on (6/28), c/b fluid collection s/p IR aspiration of perihepatic fluid on (7/3), c/b wound dehiscence s/p RTOR exlap, washout, ileocolic resection  (7/5) transferred to SICU postoperatively for hemodynamic monitoring, with hospital course complicated by periods of severe ELVI and hyponatremia, which resolved but stepped back up for to SICU on (9/10) for acute AMS, intermittent hypoglycemia, AFib with RVR. Percutaneous Cholecystostomy placed on (9/11) for enlarged GB, PCT capped 10/5 and uncapped 10/25 for hyperbilirubinemia, Right brachial DVT, left basillic and cephalic superficial thrombus on duplex 11/2, CT 11/14 with ALDEN ground glass opacity of unclear etiology, completed empiric 7day cefepime course 11/22, and another course of 7day cefepime for PNA  (1/15-23), hyperbilirubinemia of unclear etiology, resolved candida glabrata fungemia, ELVI resolved, second episode of sinus pause--pacemaker placed (2/5). cystic duct open, Perc cony removed 2/8. Discharged home 2/12/24. Patient with numerous ED presentations prior for bleeding at site of granulation tissue requiring transfusions and most recent presentation for bleeding c/b COVID PNA requiring 1 week hospital admission (discharged 4/5) who re-presents to [facility] for elective angiogram/venogram with plans to embolize vessel feeding into granulation tissue. On 4/16, patient underwent embolization of a branch of the inferior epigastric artery. Intraoperative requires vasopressor support to maintain MAP > 65. Transferred to SICU post op.    PAST MEDICAL & SURGICAL HISTORY:  Essential hypertension  Hypertension      Atrial fibrillation  s/p cardioversion 2010 and 2014  Pt. reports 4 DCCV  Now on Amiodarone 200mg PO bid and Eliquis 5mg PO bid  Last DCCV 4 yrs ago at       Crohn's disease  s/p partial resection of ileum      Hyperlipidemia      Hypothyroidism      History of depression  On Venlafaxine ER 150mg PO bid      Junctional rhythm      Bradycardia      H/O knee surgery      History of cataract surgery      S/P appendectomy        Home Meds: Home Medications:  allopurinol 300 mg oral tablet: 1 tab(s) orally once a day (16 Apr 2024 10:54)  Lopressor 50 mg oral tablet: 1 tab(s) orally once a day (16 Apr 2024 10:54)  rosuvastatin 5 mg oral tablet: 1 tab(s) orally once a day (16 Apr 2024 10:56)  venlafaxine 150 mg oral tablet, extended release: 2 tab(s) orally once a day (16 Apr 2024 10:54)    Allergies: Allergies    penicillin (Angioedema)        VITAL SIGNS, INS/OUTS (last 24 hours):  --------------------------------------------------------------------------------------  ICU Vital Signs Last 24 Hrs  T(C): 36.5 (16 Apr 2024 11:29), Max: 36.5 (16 Apr 2024 10:29)  T(F): 97.7 (16 Apr 2024 10:29), Max: 97.7 (16 Apr 2024 10:29)  HR: 103 (16 Apr 2024 11:29) (103 - 103)  BP: 111/58 (16 Apr 2024 11:29) (111/58 - 111/58)  BP(mean): 75 (16 Apr 2024 10:29) (75 - 75)  RR: 18 (16 Apr 2024 11:29) (18 - 18)  SpO2: 96% (16 Apr 2024 11:29) (96% - 96%)    O2 Parameters below as of 16 Apr 2024 10:29  Patient On (Oxygen Delivery Method): room air          I&O's Summary    16 Apr 2024 07:01  -  16 Apr 2024 15:47  --------------------------------------------------------  IN: 375 mL / OUT: 200 mL / NET: 175 mL      --------------------------------------------------------------------------------------    EXAM:  General: Resting comfortably in bed, NAD, slightly jaundiced  Neuro: A&Ox3, no focal deficits noted  HEENT: ATNC  Pulmonary: Nonlabored breathing, no respiratory distress. Lungs CTA B/L without wheezes, rhonchi appreciated  Cardiovascular: Irregularly irregular,   Abdomen: Soft, nondistended, nontender. Midline stoma appreciated with surrounding granulation tisuse bilious output in bag. R hemiabdomen ileostomy with minimal SS output  Extremeties: WWP, Minimal pedal edema appreciated. Doppler DP pulses  Groin: R groin soft without ecchymosis or hematoma. Dressing CDI  LAUREN: No joint swelling or erythema appreciated  Skin: Dry, no rashes  Psychiatric: Goal-oriented thought, normal affect    LABS  --------------------------------------------------------------------------------------  Labs:  CAPILLARY BLOOD GLUCOSE                              8.4    12.44 )-----------( 141      ( 16 Apr 2024 10:35 )             25.9       Auto Neutrophil %: 82.4 % (04-16-24 @ 10:35)  Auto Immature Granulocyte %: 0.5 % (04-16-24 @ 10:35)    04-16    141  |  102  |  53<H>  ----------------------------<  111<H>  4.2   |  30  |  1.48<H>      Calcium: 7.9 mg/dL (04-16-24 @ 10:35)         Coags:     14.5   ----< 1.28    ( 16 Apr 2024 10:35 )     34.1                Urinalysis Basic - ( 16 Apr 2024 10:35 )    Color: x / Appearance: x / SG: x / pH: x  Gluc: 111 mg/dL / Ketone: x  / Bili: x / Urobili: x   Blood: x / Protein: x / Nitrite: x   Leuk Esterase: x / RBC: x / WBC x   Sq Epi: x / Non Sq Epi: x / Bacteria: x

## 2024-04-16 NOTE — H&P ADULT - NSICDXPASTMEDICALHX_GEN_ALL_CORE_FT
PAST MEDICAL HISTORY:  Atrial fibrillation s/p cardioversion 2010 and 2014  Pt. reports 4 DCCV  Now on Amiodarone 200mg PO bid and Eliquis 5mg PO bid  Last DCCV 4 yrs ago at [facility]    Bradycardia     Crohn's disease s/p partial resection of ileum    Essential hypertension Hypertension    History of depression On Venlafaxine ER 150mg PO bid    Hyperlipidemia     Hypothyroidism     Junctional rhythm

## 2024-04-16 NOTE — BRIEF OPERATIVE NOTE - NSICDXBRIEFPOSTOP_GEN_ALL_CORE_FT
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

## 2024-04-16 NOTE — H&P ADULT - ASSESSMENT
79yo M with PMH of AFib/Flutter (no AC given ongoing GIB) s/p DCCV, Crohn's, prior ileocectomy and open appendectomy, extensive abdominal surgeries and ileostomy, short-bowel syndrome, hypothyroidism, depression, multiple visits to ED for blood transfusions most recently on 4/13/24 requiring 2u pRBC and multiple admissions for transfusion most recently directly admitted on 03/28-04/05 with course complicated by COVID/HAP now presents to [facility] for recommended diagnostic angiogram/venogram with possible embolization if clinically indicated in light of pts risk factors and ongoing GIB from colostomy bag.       -ASA 3, Mallampati 3  -Consent to be obtained by Dr. Zhao and JOSEPH Herrera  Risks & benefits of procedure and alternative therapy have been explained to the patient including but not limited to: allergic reaction, bleeding w/possible need for blood transfusion, infection, renal and vascular compromise, limb damage, emergent surgery. Informed consent obtained and in chart.      [ ] Patient ordered for 2units pRBC as per resident Dr. Mehdi Puente.    - H/H 8.4/25.9 Plt 141  - INR 1.28 PT/PTT 14.5/34.1   - Per chart review Hg runs in 7-9    [ ] BMP in process   [ ] CXR  Small right pleural effusion. No lung consolidation. No pneumothorax.  Right upper extremity venous catheter tip at superior vena cava.

## 2024-04-17 NOTE — DISCHARGE NOTE PROVIDER - CARE PROVIDERS DIRECT ADDRESSES
,DirectAddress_Unknown,DirectAddress_Unknown,[email or web address] ,DirectAddress_Unknown,DirectAddress_Unknown

## 2024-04-17 NOTE — PROGRESS NOTE ADULT - REASON FOR ADMISSION
diagnostic angiogram and venogram with possible embolization

## 2024-04-17 NOTE — DISCHARGE NOTE PROVIDER - NSDCCPCAREPLAN_GEN_ALL_CORE_FT
PRINCIPAL DISCHARGE DIAGNOSIS  Diagnosis: Enterocutaneous fistula  Assessment and Plan of Treatment:

## 2024-04-17 NOTE — PHYSICAL THERAPY INITIAL EVALUATION ADULT - GENERAL OBSERVATIONS, REHAB EVAL
Pt received semi supine in bed with +midline, +EKG, +ostomy, +drain, NAD. Pt left as found, NAD, call gagnon in reach, RN Livia webbers

## 2024-04-17 NOTE — PHYSICAL THERAPY INITIAL EVALUATION ADULT - LEVEL OF INDEPENDENCE: STAIR NEGOTIATION, REHAB EVAL
I have reviewed medications, follow up provider options, and discharge instructions with the parents. The parents verbalized understanding. Copy of discharge information given to parent upon discharge. Prescription(s) given to parent. Patient discharged in no distress. unable to perform

## 2024-04-17 NOTE — DISCHARGE NOTE NURSING/CASE MANAGEMENT/SOCIAL WORK - NSDCPEFALRISK_GEN_ALL_CORE
For information on Fall & Injury Prevention, visit: [email or web address] OR  [email or web address] OR  https://www.cdc.gov/steadi/patient.html

## 2024-04-17 NOTE — PHYSICAL THERAPY INITIAL EVALUATION ADULT - GAIT DEVIATIONS NOTED, PT EVAL
fairly steady gait, no lose of balance, decreased heel strike and hip flexion bilaterally./decreased chelsey/increased time in double stance/decreased step length

## 2024-04-17 NOTE — PROGRESS NOTE ADULT - ATTENDING COMMENTS
77 yo M with multiple complex prior hospitalizations for complications 2/2 SBO vs Crohn's requiring open TRE, SBR x 3, ileocolic resection and SB anatamosis, further complications of wound dehiscence and ileocolic resection, entero-atmospheric fistula, recurrent infections, ELVI, and recent covid PNA, on TPN and oral nutrition, discharged home 4/5 but with ongoing blood loss anemia from oozing of granulation tissue now s/p embolization of inferior epigastric artery c/b hypotension after induction, persistent hypotension post operatively likely related to vasoplegia and possible hypovolemia/anemia. Mild persistent lactic acidosis without other evidence of poor end organ perfusion - weaned off levo, MAP > 65, remains awake, alert, WWP, moist mucus membranes, renal function with improving Cr and uremia, excellent UOP. Required IVF to maintain euvolemia after high ostomy output once diet resumed. PO intake is excellent. Remains off TPN, plan to resume today. H/H stable but persistent anemia, no further bleeding from site. AF with mild tachycardia (100-120). Plan to transfuse 1u pRBC, if MAP remains > 65 resume home beta blocker, lactate after pRBC. Possible discharge home if tolerating Bbl without hypotension, HR improved, and no increase in lactate. CXR reviewed, no infiltrate, again low suspicion for infection.    Direct personal management at bed side and extensive interpretation of the data.  Plan was outlined and discussed in details with the housestaff.  Decision making of highest complexity  Action taken for acute disease activity to reflect the level of care provided:  - medication reconciliation  - review laboratory data  - review imaging

## 2024-04-17 NOTE — DISCHARGE NOTE PROVIDER - HOSPITAL COURSE
78M PMHx Crohn's, AFib/Flutter s/p DCCVs in past, remote ileocectomy and open appendectomy, admitted (6/23) for SBO vs Crohns flare requiring lap converted to open TRE, SBR x 3, left in discontinuity with abthera vac on (6/27), RTOR for ileocolic resection, small bowel anastomosis, and abdominal wall closure on (6/28), c/b fluid collection s/p IR aspiration of perihepatic fluid on (7/3), c/b wound dehiscence s/p RTOR exlap, washout, ileocolic resection  (7/5). With long and complicated hospital course due to periods of severe ELVI and hyponatremia due to dehydration, but stepped back up for to SICU on (9/10) for acute AMS, intermittent hypoglycemia, AFib with RVR. Percutaneous Cholecystostomy placed on (9/11) for enlarged GB, PCT capped 10/5 and uncapped 10/25 for hyperbilirubinemia, Right brachial DVT, left basillic and cephalic superficial thrombus on duplex 11/2, CT 11/14 with ALDEN ground glass opacity of unclear etiology, completed empiric 7day cefepime course 11/22, and another course of 7day cefepime for PNA (1/15-23), hyperbilirubinemia of unclear etiology, resolved candida glabrata fungemia, ELVI resolved, second episode of sinus pause--pacemaker placed (2/5). Perc cony removed 2/8. Discharged home 2/12/24. Patient with numerous ED presentations prior for bleeding at site of granulation tissue requiring transfusions and most recent presentation for bleeding c/b COVID PNA requiring 1 week hospital admission (discharged 4/5) who re-presents to [facility] for elective angiogram/venogram with plans to embolize vessel feeding into granulation tissue. On 4/16, patient underwent embolization of a branch of the inferior epigastric artery (160 cc contrast, , IVF 1.5 L). Intraoperative required vasopressor support to maintain MAP > 65. Transferred to SICU post op for further hemodynamic monitoring. Patient was able to be ween off pressors. His postoperative course was unremarkable with advancement of diet, passing trial of void, and pain control. On day of discharge patient was stable to be d/c'd home.

## 2024-04-17 NOTE — PHYSICAL THERAPY INITIAL EVALUATION ADULT - PERTINENT HX OF CURRENT PROBLEM, REHAB EVAL
Pt is a 77 yo male who re-presents to [facility] for elective angiogram/venogram with plans to embolize vessel feeding into granulation tissue. On 4/16, patient underwent embolization of a branch of the inferior epigastric artery (160 cc contrast, , IVF 1.5 L). Intraoperative required vasopressor support to maintain MAP > 65. Transferred to SICU post op.

## 2024-04-17 NOTE — DISCHARGE NOTE PROVIDER - PROVIDER TOKENS
PROVIDER:[TOKEN:[[id number]],FOLLOWUP:[1 week],ESTABLISHEDPATIENT:[T]],PROVIDER:[TOKEN:[[id number]],FOLLOWUP:[1 week],ESTABLISHEDPATIENT:[T]],PROVIDER:[TOKEN:[[id number]],FOLLOWUP:[1 week]] PROVIDER:[TOKEN:[[id number]],FOLLOWUP:[1 week],ESTABLISHEDPATIENT:[T]],PROVIDER:[TOKEN:[[id number]],FOLLOWUP:[1 week],ESTABLISHEDPATIENT:[T]]

## 2024-04-17 NOTE — PROGRESS NOTE ADULT - ASSESSMENT
Stable post intervention
78M PMHx Crohn's, AFib/Flutter s/p DCCVs in past, remote ileocectomy and open appendectomy, admitted (6/23) for SBO vs Crohns flare requiring lap converted to open TRE, SBR x 3, left in discontinuity with abthera vac on (6/27), RTOR for ileocolic resection, small bowel anastomosis, and abdominal wall closure on (6/28), c/b fluid collection s/p IR aspiration of perihepatic fluid on (7/3), c/b wound dehiscence s/p RTOR exlap, washout, ileocolic resection  (7/5). With long and complicated hospital course due to periods of severe ELVI and hyponatremia due to dehydration, but stepped back up for to SICU on (9/10) for acute AMS, intermittent hypoglycemia, AFib with RVR. Percutaneous Cholecystostomy placed on (9/11) for enlarged GB, PCT capped 10/5 and uncapped 10/25 for hyperbilirubinemia, Right brachial DVT, left basillic and cephalic superficial thrombus on duplex 11/2, CT 11/14 with ALDEN ground glass opacity of unclear etiology, completed empiric 7day cefepime course 11/22, and another course of 7day cefepime for PNA (1/15-23), hyperbilirubinemia of unclear etiology, resolved candida glabrata fungemia, ELVI resolved, second episode of sinus pause--pacemaker placed (2/5). Perc cony removed 2/8. Discharged home 2/12/24. Patient with numerous ED presentations prior for bleeding at site of granulation tissue requiring transfusions and most recent presentation for bleeding c/b COVID PNA requiring 1 week hospital admission (discharged 4/5) who re-presents to [facility] for elective angiogram/venogram with plans to embolize vessel feeding into granulation tissue. On 4/16, patient underwent embolization of a branch of the inferior epigastric artery (160 cc contrast, , IVF 1.5 L). Intraoperative required vasopressor support to maintain MAP > 65. Transferred to SICU post op for further hemodynamic monitoring.    Neuro: Pain control PRN with Tylenol. Nausea control PRN with Zofran. Hx of depression - continue home Wellbutrin   HEENT: No concerns  CV: Goal maintain MAP >65. Likely vasoplegia in setting recent anesthesia/procedure, now off levophed. Hx of AFib/Flutter s/p DCCVs, as well as sinus pauses s/p pacemaker (2/5) - restart lopressor after 1u pRBCs and IV iron transfusion if BP, pulse allows, no AC at home. Hx of HLD - continue rosuvastatin.   Pulm: Goal maintain saturation > 94%. Recent admission for COVID PNA, CXR unchanged from prior. NC PRN. No hx of recent steroid use.  GI: Regular diet/TPN. Hx of Crohn's SBO s/p ex lap with numerous SBR and RTOR for dehiscence c/b entero-atmospheric fistula. Numerous admissions for bleeding fistula requiring transfusions. Hx of pancreatic insufficiency - continue Creon  : Voids  ID: s/p perioperative Ancef (4/16)  Endo: mISS. Hx of hypothyroidism - continue home synthroid. TSH wnl  Heme/PPx: SCDs/SQL  Wounds/Lines/Drains: Ileostomy, Fistula, RUE PICC  PT/OT: Ordered  Dispo: SICU    
78M PMHx Crohn's, AFib/Flutter s/p DCCVs in past, remote ileocectomy and open appendectomy, admitted (6/23) for SBO vs Crohns flare requiring lap converted to open TRE, SBR x 3, left in discontinuity with abthera vac on (6/27), RTOR for ileocolic resection, small bowel anastomosis, and abdominal wall closure on (6/28), c/b fluid collection s/p IR aspiration of perihepatic fluid on (7/3), c/b wound dehiscence s/p RTOR exlap, washout, ileocolic resection  (7/5). With long and complicated hospital course due to periods of severe ELVI and hyponatremia due to dehydration, but stepped back up for to SICU on (9/10) for acute AMS, intermittent hypoglycemia, AFib with RVR. Percutaneous Cholecystostomy placed on (9/11) for enlarged GB, PCT capped 10/5 and uncapped 10/25 for hyperbilirubinemia, Right brachial DVT, left basillic and cephalic superficial thrombus on duplex 11/2, CT 11/14 with ALDEN ground glass opacity of unclear etiology, completed empiric 7day cefepime course 11/22, and another course of 7day cefepime for PNA (1/15-23), hyperbilirubinemia of unclear etiology, resolved candida glabrata fungemia, ELVI resolved, second episode of sinus pause--pacemaker placed (2/5). Perc cony removed 2/8. Discharged home 2/12/24. Patient with numerous ED presentations prior for bleeding at site of granulation tissue requiring transfusions and most recent presentation for bleeding c/b COVID PNA requiring 1 week hospital admission (discharged 4/5) who re-presents to [facility] for elective angiogram/venogram with plans to embolize vessel feeding into granulation tissue. On 4/16, patient underwent embolization of a branch of the inferior epigastric artery (160 cc contrast, , IVF 1.5 L). Intraoperative required vasopressor support to maintain MAP > 65. Transferred to SICU post op. HD stable, off pressors.    Neuro: Pain control PRN with Tylenol. Nausea control PRN with Zofran. Hx of depression - continue home Wellbutrin   HEENT: No concerns  CV: Goal maintain MAP >65. Likely vasoplegia in setting recent anesthesia/procedure requiring levophed, now weaned off. SBP 90s. Hx of AFib/Flutter s/p DCCVs, as well as sinus pauses s/p pacemaker (2/5) - holding lopressor given requiring pressors. Hx of HLD - continue rosuvastatin.   Pulm: Goal maintain saturation > 94%. Recent admission for COVID PNA, f/u CXR. NC PRN. No hx of recent steroid use.  GI: Regular diet after lying flat. Hx of Crohn's SBO s/p ex lap with numerous SBR and RTOR for dehiscence c/b entero-atmospheric fistula. Numerous episodes   : Voids. Condom cath for strict Is and Os  ID: s/p perioperative Ancef (4/16)  Endo: mISS. Hx of hypothyroidism - continue home synthroid.  Heme/PPx: SCDs  Wounds/Lines/Drains: Ileostomy, Fistula, RUE PICC  PT/OT: Ordered  Dispo: SICU

## 2024-04-17 NOTE — PHYSICAL THERAPY INITIAL EVALUATION ADULT - ADDITIONAL COMMENTS
Pt lives with spouse in an apt with elevator, denies stairs. Prior to admission, pt ambulated independently with rollator and cane. Pt has someone around the clock and available to assist him with ADLs and functional mobility if needed.

## 2024-04-17 NOTE — DISCHARGE NOTE PROVIDER - CARE PROVIDER_API CALL
Rikki Peterson  Colon/Rectal Surgery  [address], [address]  [place] [address]  Phone: [phone]  Fax: [phone]  Established Patient  Follow Up Time: 1 week    Daniel Aguiar  Internal Medicine  [address] [address]  Phone: [phone]  Fax: [phone]  Established Patient  Follow Up Time: 1 week    Leighton Zhao  Vascular/[facility]  [address]  [place] [address]  Phone: [phone]  Fax: [phone]  Follow Up Time: 1 week   Rikki Peterson.  Colon/Rectal Surgery  [address], [address] [address]  Phone: [phone]  Fax: [phone]  Established Patient  Follow Up Time: 1 week    Daniel Aguiar  Internal Medicine  [address] [address]  Phone: [phone]  Fax: [phone]  Established Patient  Follow Up Time: 1 week

## 2024-04-17 NOTE — DISCHARGE NOTE PROVIDER - NSDCCPTREATMENT_GEN_ALL_CORE_FT
PRINCIPAL PROCEDURE  Procedure: Embolization, artery  Findings and Treatment:       SECONDARY PROCEDURE  Procedure: Angiogram abdomen  Findings and Treatment:

## 2024-04-17 NOTE — CHART NOTE - NSCHARTNOTEFT_GEN_A_CORE
SICU consulted PICC team for replacement of PICC line.    Patient presented to [facility] following several admissions for blood transfusions and now here for recommended diagnostic angiogram/venogram with possible embolization. Patient had a PICC line placed for TPN prior to discharge in March.    There is no clinical indication for replacement of PICC at this time. Replacing a PICC unnecessarily puts the patient at risk for further clabsi and/or sclerosis to vasculature, given his mulitple PICC line history. The PICC should be used for TPN only. Additionally, the patient is on a regular diet this admission. If the patient no longer requires TPN at home, should ensure that the PICC line is removed if there is no nurse maintaining the dressing changes and keeping the line clean and intact to reduce the risk of any future infection.    Discussed with epidemiology and infectious disease, Dr. Frye, who are in agreement.

## 2024-04-17 NOTE — DISCHARGE NOTE PROVIDER - NSDCMRMEDTOKEN_GEN_ALL_CORE_FT
allopurinol 300 mg oral tablet: 1 tab(s) orally once a day  intravenous electrolyte (Lypholyte II/Nutrilyte II/TPN Electrolytes) solution: 1 each intravenous once a day  Lopressor 50 mg oral tablet: 1 tab(s) orally once a day  pancrelipase 24,000 units-76,000 units-120,000 units oral delayed release capsule: 1 cap(s) orally 3 times a day (with meals)  rosuvastatin 5 mg oral tablet: 1 tab(s) orally once a day  Synthroid 50 mcg (0.05 mg) oral tablet: 1 tab(s) orally once a day  venlafaxine 150 mg oral tablet, extended release: 2 tab(s) orally once a day

## 2024-04-17 NOTE — DISCHARGE NOTE NURSING/CASE MANAGEMENT/SOCIAL WORK - NSDCVIVACCINE_GEN_ALL_CORE_FT
influenza, high-dose, quadrivalent; 23-Jan-2024 11:29; Deandra Mackay (RN); [employer]; [id number] (Exp. Date: 30-Jun-2024); IntraMuscular; Deltoid Left.; 0.7 milliLiter(s); VIS (VIS Published: 06-Aug-2021, VIS Presented: 23-Jan-2024);

## 2024-04-17 NOTE — DISCHARGE NOTE PROVIDER - NSDCFUADDINST_GEN_ALL_CORE_FT
General Discharge Instructions:  Please resume all regular home medications unless specifically advised not to take a particular medication.   Please get plenty of rest, continue to ambulate several times per day, and drink adequate amounts of fluids. Avoid lifting weights greater than 5-10 lbs until you follow-up with your surgeon, who will instruct you further regarding activity restrictions.  Avoid driving or operating heavy machinery while taking pain medications.  Please follow-up with your surgeon and Primary Care Provider (PCP) as advised.  Incision Care:  *Please call your doctor or nurse practitioner if you have increased pain, swelling, redness, or drainage from the incision site.  *Avoid swimming and baths until your follow-up appointment.  *You may shower, and wash surgical incisions with a mild soap and warm water. Gently pat the area dry.    Warning Signs:  Please call your doctor or nurse practitioner if you experience the following:  *You experience new chest pain, pressure, squeezing or tightness.  *New or worsening cough, shortness of breath, or wheeze.  *If you are vomiting and cannot keep down fluids or your medications.  *You are getting dehydrated due to continued vomiting, diarrhea, or other reasons. Signs of dehydration include dry mouth, rapid heartbeat, or feeling dizzy or faint when standing.  *You see blood or dark/black material when you vomit or have a bowel movement.  *You experience burning when you urinate, have blood in your urine, or experience a discharge.  *Your pain is not improving within 8-12 hours or is not gone within 24 hours. Call or return immediately if your pain is getting worse, changes location, or moves to your chest or back.  *You have shaking chills, or fever greater than 101.5 degrees Fahrenheit or 38 degrees Celsius.  *Any change in your symptoms, or any new symptoms that concern you.

## 2024-04-17 NOTE — DISCHARGE NOTE NURSING/CASE MANAGEMENT/SOCIAL WORK - PATIENT PORTAL LINK FT
You can access the FollowMyHealth Patient Portal offered by [facility] by registering at the following website: [email or web address]. By joining [employer]’s FollowMyHealth portal, you will also be able to view your health information using other applications (apps) compatible with our system.

## 2024-04-17 NOTE — PROGRESS NOTE ADULT - SUBJECTIVE AND OBJECTIVE BOX
S/P angio  embolization of Left inferior epigastric artery  control of bleeding  discussed with Dr Zhao and Dr Puente  postop management in ICU  requiring pressors post angio  no blood in stoma appliance  
off pressors  AVSS  BP on low side  adequate UO  no bleeding  Stable H&H    Transfuse iron and prbc  med and ICU input
Awake and alert No complaints No bleeding overnight BP slightly low Afeb VR moderate Exam stable
SICU PROGRESS NOTE    ON: : HLIV after starting diet. Came off Levophed. 10pm LA 2.9. HR 110s, dry MM and feels very thirsty, gave LR bolus 500cc w improvement in HR. Ostomy output 1L in the next couple hours, SBP 80s, gave additional 500cc bolus. Next LA AM: 3 Afib throughout the night HR 90s-110s.    SUBJECTIVE: Pt seen and examined at bedside this am by ICU team. Patient is lying comfortably in bed with no complaints. States pain is well controlled. Denies abdominal pain. Denies bleed from fistula site. Denies symptoms of anemia. Denies SOB.      MEDICATIONS  (STANDING):  atorvastatin 20 milliGRAM(s) Oral at bedtime  chlorhexidine 4% Liquid 1 Application(s) Topical <User Schedule>  dextrose 10% Bolus 125 milliLiter(s) IV Bolus once  dextrose 5%. 1000 milliLiter(s) (50 mL/Hr) IV Continuous <Continuous>  dextrose 5%. 1000 milliLiter(s) (100 mL/Hr) IV Continuous <Continuous>  dextrose 50% Injectable 25 Gram(s) IV Push once  dextrose 50% Injectable 12.5 Gram(s) IV Push once  enoxaparin Injectable 40 milliGRAM(s) SubCutaneous once  glucagon  Injectable 1 milliGRAM(s) IntraMuscular once  insulin lispro (ADMELOG) corrective regimen sliding scale   SubCutaneous Before meals and at bedtime  iron sucrose IVPB 100 milliGRAM(s) IV Intermittent once  levothyroxine 50 MICROGram(s) Oral daily  lipid, fat emulsion (Fish Oil and Plant Based) 20% Infusion 0.52 Gm/kG/Day (20.6 mL/Hr) IV Continuous <Continuous>  pancrelipase  (CREON 24,000 Lipase Units) 1 Capsule(s) Oral three times a day with meals  Parenteral Nutrition - Adult 1 Each TPN Continuous <Continuous>  venlafaxine XR. 300 milliGRAM(s) Oral every 24 hours    MEDICATIONS  (PRN):  acetaminophen     Tablet .. 650 milliGRAM(s) Oral every 6 hours PRN Mild Pain (1 - 3)  dextrose Oral Gel 15 Gram(s) Oral once PRN Blood Glucose LESS THAN 70 milliGRAM(s)/deciliter  ondansetron Injectable 4 milliGRAM(s) IV Push every 6 hours PRN Nausea and/or Vomiting      Drips:     ICU Vital Signs Last 24 Hrs  T(C): 36.3 (17 Apr 2024 05:02), Max: 37.1 (16 Apr 2024 17:54)  T(F): 97.4 (17 Apr 2024 05:02), Max: 98.8 (16 Apr 2024 17:54)  HR: 117 (17 Apr 2024 08:00) (84 - 117)  BP: 105/53 (17 Apr 2024 08:00) (85/51 - 114/56)  BP(mean): 76 (17 Apr 2024 08:00) (63 - 81)  RR: 33 (17 Apr 2024 08:00) (17 - 33)  SpO2: 97% (17 Apr 2024 08:00) (93% - 100%)    O2 Parameters below as of 17 Apr 2024 08:00  Patient On (Oxygen Delivery Method): room air            Physical Exam:  General: Resting in bed, NAD, jaundiced  HEENT: Sceral icterus noted. Neck supple, no JVD or lymphadenopathy appreciated  Pulmonary: Nonlabored breathing, no respiratory distress, CTA-B  Cardiovascular: Irregularly irregular  Abdominal: soft, NT/ND, midline fistula appreciated with surrounding granulating tissue - bilious output noted in appliance. Mucus fistula in R abdomen with SS output  Extremities: WWP, 5/5 strength x 4, no clubbing/cyanosis/edema  Neuro: A/O x3, CNs II-XII grossly intact, normal motor/sensation, no focal deficits  Pulses: palpable distal pulses    Lines/tubes/drains: PICC    I&O's Summary    16 Apr 2024 07:01  -  17 Apr 2024 07:00  --------------------------------------------------------  IN: 2325 mL / OUT: 2780 mL / NET: -455 mL    17 Apr 2024 07:01  -  17 Apr 2024 10:09  --------------------------------------------------------  IN: 0 mL / OUT: 750 mL / NET: -750 mL        LABS:                        8.2    10.59 )-----------( 107      ( 17 Apr 2024 05:30 )             25.1     04-17    133<L>  |  100  |  39<H>  ----------------------------<  69<L>  4.3   |  26  |  1.38<H>    Ca    8.1<L>      17 Apr 2024 05:30  Phos  3.8     04-17  Mg     2.1     04-17    TPro  6.2  /  Alb  1.9<L>  /  TBili  5.5<H>  /  DBili  4.0<H>  /  AST  280<H>  /  ALT  159<H>  /  AlkPhos  99  04-17    PT/INR - ( 17 Apr 2024 05:30 )   PT: 14.1 sec;   INR: 1.24          PTT - ( 16 Apr 2024 10:35 )  PTT:34.1 sec  Urinalysis Basic - ( 17 Apr 2024 05:30 )    Color: x / Appearance: x / SG: x / pH: x  Gluc: 69 mg/dL / Ketone: x  / Bili: x / Urobili: x   Blood: x / Protein: x / Nitrite: x   Leuk Esterase: x / RBC: x / WBC x   Sq Epi: x / Non Sq Epi: x / Bacteria: x      CAPILLARY BLOOD GLUCOSE      POCT Blood Glucose.: 93 mg/dL (17 Apr 2024 07:53)  POCT Blood Glucose.: 66 mg/dL (17 Apr 2024 07:03)  POCT Blood Glucose.: 92 mg/dL (16 Apr 2024 16:48)    LIVER FUNCTIONS - ( 17 Apr 2024 05:30 )  Alb: 1.9 g/dL / Pro: 6.2 g/dL / ALK PHOS: 99 U/L / ALT: 159 U/L / AST: 280 U/L / GGT: x             Cultures:    RADIOLOGY & ADDITIONAL STUDIES:    
SUBJECTIVE: Pt seen and examined at bedside this am by surgery team. No acute complaints. Feels well, SBP 94, VSS. No active bleed on exam. Tolerating diet, pain well controlled. Denies f/n/v/cp/sob.    MEDICATIONS  (STANDING):  atorvastatin 20 milliGRAM(s) Oral at bedtime  chlorhexidine 4% Liquid 1 Application(s) Topical <User Schedule>  dextrose 10% Bolus 125 milliLiter(s) IV Bolus once  dextrose 5%. 1000 milliLiter(s) (50 mL/Hr) IV Continuous <Continuous>  dextrose 5%. 1000 milliLiter(s) (100 mL/Hr) IV Continuous <Continuous>  dextrose 50% Injectable 25 Gram(s) IV Push once  dextrose 50% Injectable 12.5 Gram(s) IV Push once  glucagon  Injectable 1 milliGRAM(s) IntraMuscular once  insulin lispro (ADMELOG) corrective regimen sliding scale   SubCutaneous three times a day before meals  iron sucrose IVPB 100 milliGRAM(s) IV Intermittent once  levothyroxine 50 MICROGram(s) Oral daily  lipid, fat emulsion (Fish Oil and Plant Based) 20% Infusion 0.52 Gm/kG/Day (20.6 mL/Hr) IV Continuous <Continuous>  pancrelipase  (CREON 24,000 Lipase Units) 1 Capsule(s) Oral three times a day with meals  Parenteral Nutrition - Adult 1 Each TPN Continuous <Continuous>  venlafaxine XR. 300 milliGRAM(s) Oral every 24 hours    MEDICATIONS  (PRN):  acetaminophen     Tablet .. 650 milliGRAM(s) Oral every 6 hours PRN Mild Pain (1 - 3)  dextrose Oral Gel 15 Gram(s) Oral once PRN Blood Glucose LESS THAN 70 milliGRAM(s)/deciliter  ondansetron Injectable 4 milliGRAM(s) IV Push every 6 hours PRN Nausea and/or Vomiting      Vital Signs Last 24 Hrs  T(C): 36.3 (17 Apr 2024 05:02), Max: 37.1 (16 Apr 2024 17:54)  T(F): 97.4 (17 Apr 2024 05:02), Max: 98.8 (16 Apr 2024 17:54)  HR: 105 (17 Apr 2024 07:00) (84 - 114)  BP: 91/50 (17 Apr 2024 07:00) (85/51 - 114/56)  BP(mean): 65 (17 Apr 2024 07:00) (63 - 81)  RR: 30 (17 Apr 2024 07:00) (17 - 30)  SpO2: 99% (17 Apr 2024 07:00) (93% - 100%)    Parameters below as of 17 Apr 2024 07:00  Patient On (Oxygen Delivery Method): room air    PHYSICAL EXAM:    Constitutional: A&Ox3, NAD    Respiratory: non labored breathing, no respiratory distress    Cardiovascular: NSR, RRR    Gastrointestinal: abdomen soft, nd, nt. Midline stoma w/ surrounding granulation tissue, liquid bilious output in bag. No active bleeding/drainage in R abdominal bag. No R/G.    Extremities: WWP, no calf tenderness, minimal pedal edema appreciated. R groin soft without ecchymosis or hematoma. Dressing CDI    I&O's Detail    16 Apr 2024 07:01  -  17 Apr 2024 07:00  --------------------------------------------------------  IN:    Lactated Ringers: 900 mL    Lactated Ringers Bolus: 1000 mL    Norepinephrine: 50 mL    PRBCs (Packed Red Blood Cells): 375 mL  Total IN: 2325 mL    OUT:    Drain (mL): 1560 mL    Ileostomy (mL): 100 mL    Other (mL): 100 mL    Voided (mL): 1020 mL  Total OUT: 2780 mL    Total NET: -455 mL          LABS:                        8.2    10.59 )-----------( 107      ( 17 Apr 2024 05:30 )             25.1     04-17    133<L>  |  100  |  39<H>  ----------------------------<  69<L>  4.3   |  26  |  1.38<H>    Ca    8.1<L>      17 Apr 2024 05:30  Phos  3.8     04-17  Mg     2.1     04-17      PT/INR - ( 17 Apr 2024 05:30 )   PT: 14.1 sec;   INR: 1.24          PTT - ( 16 Apr 2024 10:35 )  PTT:34.1 sec  Urinalysis Basic - ( 17 Apr 2024 05:30 )    Color: x / Appearance: x / SG: x / pH: x  Gluc: 69 mg/dL / Ketone: x  / Bili: x / Urobili: x   Blood: x / Protein: x / Nitrite: x   Leuk Esterase: x / RBC: x / WBC x   Sq Epi: x / Non Sq Epi: x / Bacteria: x        RADIOLOGY & ADDITIONAL STUDIES:

## 2024-04-24 NOTE — ED ADULT NURSE NOTE - NSFALLRISKINTERV_ED_ALL_ED
Assistance OOB with selected safe patient handling equipment if applicable/Assistance with ambulation/Communicate fall risk and risk factors to all staff, patient, and family/Monitor gait and stability/Provide visual cue: yellow wristband, yellow gown, etc/Reinforce activity limits and safety measures with patient and family/Call bell, personal items and telephone in reach/Instruct patient to call for assistance before getting out of bed/chair/stretcher/Non-slip footwear applied when patient is off stretcher/[place] to call system/Physically safe environment - no spills, clutter or unnecessary equipment/Purposeful Proactive Rounding/Room/bathroom lighting operational, light cord in reach

## 2024-04-24 NOTE — ED ADULT TRIAGE NOTE - BP NONINVASIVE SYSTOLIC (MM HG)
90
Pt pw right lower  leg  abscess mild cellulitis  since  traumatic injury to right lower leg  10 days ago - not immunocompromised no systemic symptoms  I and D  and abx  Patient to be discharged from ED in well appearing condition. Any available test results were discussed with and printed  for patient.  Verbal instructions given, including instructions to return to ED immediately for any new, worsening, or concerning symptoms. Limitations of ED work up discussed.  Patient reports understanding of above with capacity and insight. Written discharge instructions additionally given, including follow-up plan.

## 2024-04-24 NOTE — ED ADULT NURSE NOTE - OBJECTIVE STATEMENT
pt from home by EMS with low HG, needs blood transfusion, with colostomy , bloody output, skin tears/lac around stoma, right upper arm PICC line for TPN, skin intact,  c/o fatigue, private RN at bedside

## 2024-04-24 NOTE — ED ADULT NURSE NOTE - NSFALLASSESSNEED_ED_ALL_ED
How Severe Is Your Changing Mole?: mild
Has Your Changing Mole Been Treated?: has not been treated
Family Member: Sister
yes

## 2024-04-25 NOTE — H&P ADULT - HISTORY OF PRESENT ILLNESS
78M PMHx Crohn's, AFib/Flutter s/p DCCVs in past, remote ileocectomy and open appendectomy, admitted (6/23) for SBO vs Crohns flare requiring lap converted to open TRE, SBR x 3, left in discontinuity with abthera vac on (6/27), RTOR for ileocolic resection, small bowel anastomosis, and abdominal wall closure on (6/28), c/b fluid collection s/p IR aspiration of perihepatic fluid on (7/3), c/b wound dehiscence s/p RTOR exlap, washout, ileocolic resection  (7/5). With long and complicated hospital course due to periods of severe ELVI and hyponatremia due to dehydration, but stepped back up for to SICU on (9/10) for acute AMS, intermittent hypoglycemia, AFib with RVR. Percutaneous Cholecystostomy placed on (9/11) for enlarged GB, PCT capped 10/5 and uncapped 10/25 for hyperbilirubinemia, Right brachial DVT, left basillic and cephalic superficial thrombus on duplex 11/2, CT 11/14 with ALDEN ground glass opacity of unclear etiology, completed empiric 7day cefepime course 11/22, and another course of 7day cefepime for PNA (1/15-23), hyperbilirubinemia of unclear etiology, resolved candida glabrata fungemia, ELVI resolved, second episode of sinus pause--pacemaker placed (2/5). Perc cony removed 2/8. Discharged home 2/12/24. Patient with numerous ED presentations prior for bleeding at site of granulation tissue requiring transfusions and most recent presentation for bleeding c/b COVID PNA requiring 1 week hospital admission (discharged 4/5) who re-presents to [facility] for elective angiogram/venogram with plans to embolize vessel feeding into granulation tissue. On 4/16, patient underwent embolization of a branch of the inferior epigastric artery. Now represents with continued intermittent bleeding from wound bed.

## 2024-04-25 NOTE — PATIENT PROFILE ADULT - FALL HARM RISK - HARM RISK INTERVENTIONS
Assistance with ambulation/Assistance OOB with selected safe patient handling equipment/Communicate Risk of Fall with Harm to all staff/Discuss with provider need for PT consult/Monitor gait and stability/Provide patient with walking aids - walker, cane, crutches/Reinforce activity limits and safety measures with patient and family/Sit up slowly, dangle for a short time, stand at bedside before walking/Tailored Fall Risk Interventions/Visual Cue: Yellow wristband and red socks/Bed in lowest position, wheels locked, appropriate side rails in place/Call bell, personal items and telephone in reach/Instruct patient to call for assistance before getting out of bed or chair/Non-slip footwear when patient is out of bed/[place] to call system/Physically safe environment - no spills, clutter or unnecessary equipment/Purposeful Proactive Rounding/Room/bathroom lighting operational, light cord in reach

## 2024-04-25 NOTE — CONSULT NOTE ADULT - ASSESSMENT
ASSESSMENT/ PLAN:  78M well known to surgical service with PMHx of AFib/Flutter 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 Prolonged hospital course significant for recurrent AKIs, cholestatic transaminitis s/p perc cony, prior DVTs, PNAs, fungemia, sinus pauses s/p pacemaker and recurrent bleeding from fistula most recently s/p IR embolization of L branch of inferior epigastric artery via R groin access (4/16). Patient now re-presents with bleeding from fistula site.  In the ED, noted to develop hypotension depsite 2 u PRBC with appropriate Hb response (6.4 --> 8.3) requiring initiation of vasopressor support. Transferred to the SICU for further monitoring.       Neuro: Pain control PRN with Tylenol. Nausea control PRN with Zofran. Hx of depression - continue home Wellbutrin   HEENT: No concerns  CV: Goal maintain MAP >65. Transient hypotension requiring levophed likely related to hypovolemia in setting of fistula output and bleeding. Will resuscitate appropriately and wean levophed as tolerated. AFib/Flutter s/p DCCVs, as well as sinus pauses s/p pacemaker (2/5) - not on AC at home, will restart home lopressor when BP allows. Hx of HLD - continue rosuvastatin.   Pulm: Goal maintain saturation > 94%. Recent admission for COVID PNA. No active pulmonary concerns.  GI: Regular diet/TPN. Hx of Crohn's SBO s/p ex lap with numerous SBR and RTOR for dehiscence c/b entero-atmospheric fistula. Numerous admissions for bleeding fistula requiring transfusions - s/p 2 u PRBC this admission with bleeding resolved. Hx of pancreatic insufficiency - continue Creon  : Voids  ID: Not currently indicated. Monitor leukocytosis and fever curve. F/U BCx  Endo: mISS. Hx of hypothyroidism - continue home synthroid.  Heme/PPx: SCDs. Holding lovenox in setting of bleeding from fistula  Wounds/Lines/Drains: Ileostomy, Fistula, RUE PICC  PT/OT: Ordered  Dispo: SICU ASSESSMENT/ PLAN:  78M well known to surgical service with PMHx of AFib/Flutter 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 Prolonged hospital course significant for recurrent AKIs, cholestatic transaminitis s/p perc cony, prior DVTs, PNAs, fungemia, sinus pauses s/p pacemaker and recurrent bleeding from fistula most recently s/p IR embolization of L branch of inferior epigastric artery via R groin access (4/16). Patient now re-presents with bleeding from fistula site.  In the ED, noted to develop hypotension depsite 2 u PRBC with appropriate Hb response (6.4 --> 8.3) requiring initiation of vasopressor support. Transferred to the SICU for further monitoring.       Neuro: Pain control PRN with Tylenol. Nausea control PRN with Zofran. Hx of depression - continue home Wellbutrin   HEENT: No concerns  CV: Goal maintain MAP >65. Transient hypotension requiring levophed likely related to hypovolemia in setting of fistula output and bleeding. Will resuscitate appropriately and wean levophed as tolerated. AFib/Flutter s/p DCCVs, as well as sinus pauses s/p pacemaker (2/5) - not on AC at home, will restart home lopressor when BP allows. Hx of HLD - continue rosuvastatin.   Pulm: Goal maintain saturation > 94%. Recent admission for COVID PNA. No active pulmonary concerns.  GI: Regular diet/TPN. Hx of Crohn's SBO s/p ex lap with numerous SBR and RTOR for dehiscence c/b entero-atmospheric fistula. Numerous admissions for bleeding fistula requiring transfusions - s/p 2 u PRBC this admission with bleeding resolved. Hx of pancreatic insufficiency - continue Creon  : Voids. ELVI on presentation - likely pre-renal in setting of hypovolemia. Monitor Cr  ID: Not currently indicated. Monitor leukocytosis and fever curve. F/U BCx  Endo: mISS. Hx of hypothyroidism - continue home synthroid.  Heme/PPx: SCDs. Holding lovenox in setting of bleeding from fistula  Wounds/Lines/Drains: Ileostomy, Fistula, RUE PICC  PT/OT: Ordered  Dispo: SICU

## 2024-04-25 NOTE — CHART NOTE - NSCHARTNOTEFT_GEN_A_CORE
RD/nutrition team consulted for TPN recommendations for this patient.     RD visited pt in the ED, pt is to be transferred to the SICU per nursing and per medical team. Pt has been receiving norepinephrine drip/pressor support, per nursing and per electronic medical records documentation. Pt on 2L of nasal cannula, SpO2 %. Pt received TPN overnight per medical team and nursing. When RD spoke to pt, pt endorsed no GI upset at the time such as nausea/emesis. No noted weight changes recently per pt, dosing weight of 95.3kg (~210 pounds) is similar to weight on most recent admission (4/1/24). Please see energy needs and consider the following TPN recommendations below. RD to relay this to the medical team.     Weight used for calculations:  current weight   Estimated Energy Needs Weight (lbs)	210.1 lb  Estimated Energy Needs Weight (kg)	95.3 kg  energy needs: 25-30kcal/kg = 1985-3198 calories/day   protein: 1.2-2g/kg = 114..6g/day  fluids: 25-30mL/kg =  2382-2859mL/day  Other Calculations: Ideal body weight is 184 pounds, pt is 114% of ideal body weight. Estimated nutritional needs determined related to clinical judgement for ileostomy/fistula, SBS, using current body weight 95.3 kg (~210 pounds).    Nutrition Recommendations:   -Consider liberalizing low fat diet to regular diet**  -Continue parenteral nutrition support via PICC    *Recommend continue regimen of 1.6L over 12hrs with 250g Dextrose, 112g Amino Acids, and 50g SMOF per day to provide 1798 calories and 112 g Protein. with GIR of 3.64 mg/kg/min.    *TPN to provide ~75% of estimated calorie needs, ~100% of protein needs   *Zn and Cu x 3/week  -Monitor tolerance to TPN and regular diet  -Align nutrition with goals of care at all times  -Weekly weights  -Monitor chemistry, GI function, and skin integrity    **RD to follow up with medical team; a full assessment and additional recommendations to follow once pt is transferred onto the unit (SICU). RD to remain available.**    Reyna Thao, [place], CDN, ext [phone] RD/nutrition team consulted for TPN recommendations for this patient.     RD visited pt in the ED, pt is to be transferred to the SICU per nursing and per medical team. Pt has been receiving norepinephrine drip/pressor support, per nursing and per electronic medical records documentation. Pt on 2L of nasal cannula, SpO2 %. Pt received TPN overnight per medical team and nursing. When RD spoke to pt, pt endorsed no GI upset at the time such as nausea/emesis. No noted weight changes recently per pt, dosing weight of 95.3kg (~210 pounds) is similar to weight on most recent admission (4/1/24). Please see energy needs and consider the following TPN recommendations below. RD to relay this to the medical team.     Weight used for calculations:  current weight   Estimated Energy Needs Weight (lbs)	210.1 lb  Estimated Energy Needs Weight (kg)	95.3 kg  energy needs: 25-30kcal/kg = 7489-8435 calories/day   protein: 1.2-2g/kg = 114..6g/day  fluids: 25-30mL/kg =  2382-2859mL/day  Other Calculations: Ideal body weight is 184 pounds, pt is 114% of ideal body weight. Estimated nutritional needs determined related to clinical judgement for ileostomy/fistula, SBS, using current body weight 95.3 kg (~210 pounds).    Nutrition Recommendations:   -Consider liberalizing low fat diet to regular diet**  -Continue parenteral nutrition support via PICC    *Recommend continue regimen of 1.6L over 12hrs with 250g Dextrose, 112g Amino Acids, and 50g SMOF per day to provide 1798 calories and 112 g Protein. with GIR of 3.64 mg/kg/min.    *TPN to provide ~75% of estimated calorie needs, ~100% of protein needs   *Zn and Cu x 3/week  -Monitor tolerance to TPN and regular diet  -Align nutrition with goals of care at all times  -Weekly weights  -Monitor chemistry, GI function, and skin integrity    **RD has spoken with medical team in regards to current TPN recommendations; a full assessment and additional recommendations to follow once pt is transferred onto the unit (SICU). RD to remain available.**    Reyna Thao, [place], CDN, ext [phone]

## 2024-04-25 NOTE — ED PROVIDER NOTE - PROGRESS NOTE DETAILS
Laney - signed out pending transfusion and surgery dispo.  As per surgery, requests admission to regional for further mgmt

## 2024-04-25 NOTE — CONSULT NOTE ADULT - ATTENDING COMMENTS
AF, UC s/p SBR with enteroatmospheric fistula with acute on chronic blood loss anemia, dehydration  physical as above  2 units PRBC  one liter of RL  TPN written  continue metoprolol  PICC line replaced yesterday

## 2024-04-25 NOTE — CONSULT NOTE ADULT - SUBJECTIVE AND OBJECTIVE BOX
SICU CONSULT NOTE  Attending: Dr. Holley  ==============================================================================================================  HPI:  78M PMHx Crohn's, AFib/Flutter s/p DCCVs in past, remote ileocectomy and open appendectomy, admitted (6/23) for SBO vs Crohns flare requiring lap converted to open TRE, SBR x 3, left in discontinuity with abthera vac on (6/27), RTOR for ileocolic resection, small bowel anastomosis, and abdominal wall closure on (6/28), c/b fluid collection s/p IR aspiration of perihepatic fluid on (7/3), c/b wound dehiscence s/p RTOR exlap, washout, ileocolic resection  (7/5). With long and complicated hospital course due to periods of severe ELVI and hyponatremia due to dehydration, but stepped back up for to SICU on (9/10) for acute AMS, intermittent hypoglycemia, AFib with RVR. Percutaneous Cholecystostomy placed on (9/11) for enlarged GB, PCT capped 10/5 and uncapped 10/25 for hyperbilirubinemia, Right brachial DVT, left basillic and cephalic superficial thrombus on duplex 11/2, CT 11/14 with ALDEN ground glass opacity of unclear etiology, completed empiric 7day cefepime course 11/22, and another course of 7day cefepime for PNA (1/15-23), hyperbilirubinemia of unclear etiology, resolved candida glabrata fungemia, ELVI resolved, second episode of sinus pause--pacemaker placed (2/5). Perc cony removed 2/8. Discharged home 2/12/24. Patient with numerous ED presentations prior for bleeding at site of granulation tissue requiring transfusions and most recent presentation for bleeding c/b COVID PNA requiring 1 week hospital admission (discharged 4/5) who re-presents to [facility] for elective angiogram/venogram with plans to embolize vessel feeding into granulation tissue. On 4/16, patient underwent embolization of a branch of the inferior epigastric artery. Now represents with continued intermittent bleeding from wound bed.  (25 Apr 2024 09:41)    SICU ADDENDUM:  78M well known to surgical service with PMHx of AFib/Flutter 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 Prolonged hospital course significant for recurrent AKIs, cholestatic transaminitis s/p perc cony, prior DVTs, PNAs, fungemia, sinus pauses s/p pacemaker and recurrent bleeding from fistula most recently s/p IR embolization of L branch of inferior epigastric artery via R groin access (4/16). Patient now re-presents with bleeding from fistula site. States bleeding has been minimal since embolization, but overnight developed more high volume biliosangunous fistula output. Denied dizziness or lightheadedness. Denied fevers or chills. States he overall felt well, but thought bleeding required tranfusion so presented to [facility].  In the ED, noted to develop hypotension depsite 2 u PRBC with appropriate Hb response (6.4 --> 8.3) requiring initiation of vasopressor support.       PAST MEDICAL & SURGICAL HISTORY:  Essential hypertension  Hypertension      Atrial fibrillation  s/p cardioversion 2010 and 2014  Pt. reports 4 DCCV  Now on Amiodarone 200mg PO bid and Eliquis 5mg PO bid  Last DCCV [facility] yrs ago at [facility]      Crohn's disease  s/p partial resection of ileum      Hyperlipidemia      Hypothyroidism      History of depression  On Venlafaxine ER 150mg PO bid      Junctional rhythm      Bradycardia      H/O knee surgery      History of cataract surgery      S/P appendectomy        Home Meds: Home Medications:  allopurinol 300 mg oral tablet: 1 tab(s) orally once a day (16 Apr 2024 10:54)  Lopressor 50 mg oral tablet: 1 tab(s) orally once a day (16 Apr 2024 10:54)  rosuvastatin 5 mg oral tablet: 1 tab(s) orally once a day (16 Apr 2024 10:56)  venlafaxine 150 mg oral tablet, extended release: 2 tab(s) orally once a day (16 Apr 2024 10:54)    Allergies: Allergies    penicillin (Angioedema)    Intolerances    VITAL SIGNS, INS/OUTS (last 24 hours):  --------------------------------------------------------------------------------------  ICU Vital Signs Last 24 Hrs  T(C): 36.7 (25 Apr 2024 11:18), Max: 37.1 (25 Apr 2024 02:27)  T(F): 98 (25 Apr 2024 11:18), Max: 98.8 (25 Apr 2024 02:27)  HR: 68 (25 Apr 2024 11:40) (65 - 89)  BP: 119/55 (25 Apr 2024 11:40) (62/24 - 140/50)  RR: 19 (25 Apr 2024 11:40) (16 - 20)  SpO2: 100% (25 Apr 2024 11:40) (97% - 100%)    O2 Parameters below as of 25 Apr 2024 11:40  Patient On (Oxygen Delivery Method): nasal cannula  O2 Flow (L/min): 2        I&O's Summary    24 Apr 2024 07:01  -  25 Apr 2024 07:00  --------------------------------------------------------  IN: 1000 mL / OUT: 550 mL / NET: 450 mL      --------------------------------------------------------------------------------------    EXAM:  General: Resting comfortably in bed, NAD  Neuro: A&Ox3, normal speech  HEENT: Sceral icterus. Mucus membranes dry appearing with cracked lips. No JVD, lymphadenopathy appreciated  Pulmonary: Nonlabored breathing, no respiratory distress or accessory muscle noted. lungs CTA B/L  Cardiovascular: NSR  Abdomen: Soft, nondistended, nontender. Midline fistula noted to be pink with healthy granulation tissue and bilious, viscous output. No bleeding noted. R hemiabdomen ostomy appliance noted with minimal output  Extremeties: Toes cool to touch, but with appropriate capillary refill. No peripheral edema noted. 2+ radial and DP pulses b/l  LAUREN: No joint swelling or erythema appreciated  Skin: Jaundiced  Psychiatric: Goal-oriented thought, normal affect    LABS  --------------------------------------------------------------------------------------  Labs:  CAPILLARY BLOOD GLUCOSE                              8.3    10.32 )-----------( 203      ( 25 Apr 2024 11:02 )             25.2       Auto Neutrophil %: 76.4 % (04-25-24 @ 11:02)  Auto Immature Granulocyte %: 0.3 % (04-25-24 @ 11:02)    04-25    135  |  92<L>  |  54<H>  ----------------------------<  131<H>  3.6   |  36<H>  |  1.55<H>      Calcium: 8.0 mg/dL (04-25-24 @ 11:02)      LFTs:             6.8  | 5.2  | 300      ------------------[99      ( 25 Apr 2024 11:02 )  2.2  | x    | 157         Lipase:x      Amylase:x         Lactate, Blood: 1.5 mmol/L (04-25-24 @ 11:02)    ABG - ( 25 Apr 2024 11:30 )  pH: 7.53  /  pCO2: 44    /  pO2: 134   / HCO3: 37    / Base Excess: 12.6  /  SaO2: 99.2              Coags:     15.1   ----< 1.34    ( 25 Apr 2024 11:02 )     36.3                Urinalysis Basic - ( 25 Apr 2024 11:02 )    Color: x / Appearance: x / SG: x / pH: x  Gluc: 131 mg/dL / Ketone: x  / Bili: x / Urobili: x   Blood: x / Protein: x / Nitrite: x   Leuk Esterase: x / RBC: x / WBC x   Sq Epi: x / Non Sq Epi: x / Bacteria: x

## 2024-04-25 NOTE — CHART NOTE - NSCHARTNOTEFT_GEN_A_CORE
A standard manual wheelchair will significantly improve the patient's ability to participate in MRADLs. With a wheelchair, they will require less caregiver assist-ance, be mobile around the home for longer distances, have reduced fall risk, and be able to complete MRADLs while sitting. The patient is planning to use the wheelchair on a regular basis in the home, and has not expressed an unwillingness to do so. Their home has adequate space and level surfaces to maneuver between rooms in a wheelchair. The patient will self-propel the wheelchair. The patient has sufficient physical and mental capabilities needed to safely self-pro-pel a wheelchair in the home. A walker, cane, or crutches alone would not be sufficient at resolving the patient's mobility limitations because they have limited upper body strength, difficulty walking safely with an assistive device, and difficulty ambulating long distances at home.”

## 2024-04-25 NOTE — H&P ADULT - ASSESSMENT
78M PMHx Crohn's, AFib/Flutter s/p DCCVs in past, remote ileocectomy and open appendectomy, admitted (6/23) for SBO vs Crohns flare requiring lap converted to open TRE, SBR x 3, left in discontinuity with abthera vac on (6/27), RTOR for ileocolic resection, small bowel anastomosis, and abdominal wall closure on (6/28), c/b fluid collection s/p IR aspiration of perihepatic fluid on (7/3), c/b wound dehiscence s/p RTOR exlap, washout, ileocolic resection  (7/5). With long and complicated hospital course due to periods of severe ELVI and hyponatremia due to dehydration, but stepped back up for to SICU on (9/10) for acute AMS, intermittent hypoglycemia, AFib with RVR. Percutaneous Cholecystostomy placed on (9/11) for enlarged GB, PCT capped 10/5 and uncapped 10/25 for hyperbilirubinemia, Right brachial DVT, left basillic and cephalic superficial thrombus on duplex 11/2, CT 11/14 with ALDEN ground glass opacity of unclear etiology, completed empiric 7day cefepime course 11/22, and another course of 7day cefepime for PNA (1/15-23), hyperbilirubinemia of unclear etiology, resolved candida glabrata fungemia, ELVI resolved, second episode of sinus pause--pacemaker placed (2/5). Perc cony removed 2/8. Discharged home 2/12/24. Patient with numerous ED presentations prior for bleeding at site of granulation tissue requiring transfusions and most recent presentation for bleeding c/b COVID PNA requiring 1 week hospital admission (discharged 4/5) who re-presents to [facility] for elective angiogram/venogram with plans to embolize vessel feeding into granulation tissue. On 4/16, patient underwent embolization of a branch of the inferior epigastric artery. Now represents with continued intermittent bleeding from wound bed. S/p 2U PRBC in ED    Plan  [facility]  Regular diet with Ottoniel  Pain/nausea control PRN  Wound care consult  SCDs/IS/OOB  Repeat CBC@12  AM labs

## 2024-04-25 NOTE — ED PROVIDER NOTE - OBJECTIVE STATEMENT
79 yo M w PMH of AFib/Flutter (no AC given ongoing GIB) s/p DCCV, Crohn's, prior ileocectomy and open appendectomy, extensive abdominal surgeries and ileostomy, short-bowel syndrome, hypothyroidism, depression, multiple visits to ED for blood transfusions most recently on 4/13/24 requiring 2u pRBC and multiple admissions for transfusion most recently directly admitted on 03/28-04/05 with course complicated by COVID/HAP, embolization of branch of inferior epigastric artery 4/16/24, p/w anemia on outpt labs to 6.7 today. Pt has mild fatigue. No SOB, lightheadedness, syncope, or palpitations.

## 2024-04-25 NOTE — ED PROVIDER NOTE - NSICDXPASTMEDICALHX_GEN_ALL_CORE_FT
PAST MEDICAL HISTORY:  Atrial fibrillation s/p cardioversion 2010 and 2014  Pt. reports 4 DCCV  Now on Amiodarone 200mg PO bid and Eliquis 5mg PO bid  Last DCCV 4 yrs ago at [facility]    Bradycardia     Crohn's disease s/p partial resection of ileum    Essential hypertension Hypertension    History of depression On Venlafaxine ER 150mg PO bid    Hyperlipidemia     Hypothyroidism     Junctional rhythm

## 2024-04-25 NOTE — ED PROVIDER NOTE - NSFOLLOWUPINSTRUCTIONS_ED_ALL_ED_FT
Follow up with your primary medical doctor as soon as possible.    Return to the emergency department if your symptoms worsen or if you develop new symptoms.  If you have any problems with followup, please call the ED Referral Coordinator at [phone].

## 2024-04-25 NOTE — ED PROVIDER NOTE - PHYSICAL EXAMINATION
CONSTITUTIONAL: Non-toxic; in no apparent distress  HEAD: Normocephalic; atraumatic  EYES: PERRL; EOM intact   ENMT: External appears normal  NECK: Supple; non-tender  CARD: Normal S1, S2; no murmurs, rubs, or gallops  RESP: Normal chest excursion with respiration; breath sounds clear and equal bilaterally  ABD: Soft, non-distended; non-tender, + stoma oozing scant blood  EXT: Normal ROM in all four extremities; non-tender to palpation, no peripheral edema  SKIN: Warm, dry, no rash  NEURO:  No focal neurological deficiencies

## 2024-04-25 NOTE — ED PROVIDER NOTE - CLINICAL SUMMARY MEDICAL DECISION MAKING FREE TEXT BOX
Chronic anemia requiring transfusion.  Hg 6.7 in ED.  Symptoms overall mild.  No active bleeding.  Initial SBP 90. Will monitor closely.  Pt has been seen by surgery at bedside, assessing stoma and may treat in ED to decrease oozing.  Pt requesting no admission. Will transfuse 2U PRBC, reassess, and likely dc.

## 2024-04-25 NOTE — CONSULT NOTE ADULT - SUBJECTIVE AND OBJECTIVE BOX
SURGERY CONSULT  ==============================================================================================================  HPI: 78M PMHx Crohn's, AFib/Flutter s/p DCCVs in past, remote ileocectomy and open appendectomy, admitted (6/23) for SBO vs Crohns flare requiring lap converted to open TRE, SBR x 3, left in discontinuity with abthera vac on (6/27), RTOR for ileocolic resection, small bowel anastomosis, and abdominal wall closure on (6/28), c/b fluid collection s/p IR aspiration of perihepatic fluid on (7/3), c/b wound dehiscence s/p RTOR exlap, washout, ileocolic resection  (7/5). With long and complicated hospital course due to periods of severe ELVI and hyponatremia due to dehydration, but stepped back up for to SICU on (9/10) for acute AMS, intermittent hypoglycemia, AFib with RVR. Percutaneous Cholecystostomy placed on (9/11) for enlarged GB, PCT capped 10/5 and uncapped 10/25 for hyperbilirubinemia, Right brachial DVT, left basillic and cephalic superficial thrombus on duplex 11/2, CT 11/14 with ALDEN ground glass opacity of unclear etiology, completed empiric 7day cefepime course 11/22, and another course of 7day cefepime for PNA (1/15-23), hyperbilirubinemia of unclear etiology, resolved candida glabrata fungemia, ELVI resolved, second episode of sinus pause--pacemaker placed (2/5). Perc cony removed 2/8. Discharged home 2/12/24. Patient with numerous ED presentations prior for bleeding at site of granulation tissue requiring transfusions and most recent presentation for bleeding c/b COVID PNA requiring 1 week hospital admission (discharged 4/5) who re-presented to [facility] for elective angiogram/venogram with plans to embolize vessel feeding into granulation tissue. On 4/16, patient underwent embolization of a branch of the inferior epigastric artery (160 cc contrast, , IVF 1.5 L).     Patient now represents to [facility] ED for continued intermittent bleeding from wound bed, though less than before- laboratory values at PCP's office showing hemoglobin of 6.6. Patient endorses fatigue, otherwise feels well. Denies fever, chills, nausea, vomiting, abdominal pain- urinary symptoms. Endorses that ostomy output occasionally gets red tinged with clots. No change in output quantity however, endorses about bag change 4 times daily. on chronic TPN for nutritional support.     In the ED BP 90s/60s-->110s/80s (s/p 2 PRBC) , HR 70-80s, afebrile, saturating appropriately on RA   On exam, abdomen soft and nondistended, non tender to palpation - wound bed clean with healthy granulating tissue- bloody output in bag, later self resolved.   Labs significant for Hb 6.6 - rest as per chart     PAST MEDICAL & SURGICAL HISTORY:  Essential hypertension  Hypertension  Atrial fibrillation  s/p cardioversion 2010 and 2014  Pt. reports 4 DCCV  Now on Amiodarone 200mg PO bid and Eliquis 5mg PO bid  Last DCCV 4 yrs ago at [facility]  Crohn's disease  s/p partial resection of ileum  Hyperlipidema  Hypothyroidism  History of depression  On Venlafaxine ER 150mg PO bid  Junctional rhythm  Bradycardia  H/O knee surgery  History of cataract surgery  S/P appendectomy    Home Meds: Home Medications:  allopurinol 300 mg oral tablet: 1 tab(s) orally once a day (16 Apr 2024 10:54)  Lopressor 50 mg oral tablet: 1 tab(s) orally once a day (16 Apr 2024 10:54)  rosuvastatin 5 mg oral tablet: 1 tab(s) orally once a day (16 Apr 2024 10:56)  venlafaxine 150 mg oral tablet, extended release: 2 tab(s) orally once a day (16 Apr 2024 10:54)    Allergies: Allergies    penicillin (Angioedema)    Intolerances      Soc:   Advanced Directives: Presumed Full Code     CURRENT MEDICATIONS:   --------------------------------------------------------------------------------------  Neurologic Medications    Respiratory Medications    Cardiovascular Medications    Gastrointestinal Medications    Genitourinary Medications    Hematologic/Oncologic Medications    Antimicrobial/Immunologic Medications    Endocrine/Metabolic Medications    Topical/Other Medications    --------------------------------------------------------------------------------------    VITAL SIGNS, INS/OUTS (last 24 hours):  --------------------------------------------------------------------------------------  ICU Vital Signs Last 24 Hrs  T(C): 37 (25 Apr 2024 05:29), Max: 37.1 (25 Apr 2024 02:27)  T(F): 98.6 (25 Apr 2024 05:29), Max: 98.8 (25 Apr 2024 02:27)  HR: 76 (25 Apr 2024 05:29) (70 - 89)  BP: 110/56 (25 Apr 2024 05:29) (90/56 - 130/53)  BP(mean): --  ABP: --  ABP(mean): --  RR: 18 (25 Apr 2024 05:29) (16 - 18)  SpO2: 100% (25 Apr 2024 05:29) (97% - 100%)    O2 Parameters below as of 25 Apr 2024 05:29    O2 Flow (L/min): 2        I&O's Summary    24 Apr 2024 07:01  -  25 Apr 2024 06:22  --------------------------------------------------------  IN: 500 mL / OUT: 375 mL / NET: 125 mL      --------------------------------------------------------------------------------------    Physical Exam:  General: Resting in bed, NAD, jaundiced  HEENT: Sceral icterus noted. Neck supple, no JVD or lymphadenopathy appreciated  Pulmonary: Nonlabored breathing, no respiratory distress, CTA-B  Cardiovascular: Irregularly irregular  Abdominal: soft, NT/ND, midline fistula appreciated with surrounding granulating tissue - jaja bilious output noted in appliance. Mucus fistula in R abdomen with SS output  Extremities: WWP, 5/5 strength x 4, no clubbing/cyanosis/edema  Neuro: A/O x3, CNs II-XII grossly intact, normal motor/sensation, no focal deficits  Pulses: palpable distal pulses      LABS  --------------------------------------------------------------------------------------  Labs:  CAPILLARY BLOOD GLUCOSE                              6.7    12.01 )-----------( 241      ( 25 Apr 2024 00:04 )             20.7         04-25    135  |  90<L>  |  58<H>  ----------------------------<  83  3.5   |  37<H>  |  1.74<H>      Calcium: 7.9 mg/dL (04-25-24 @ 00:04)      LFTs:         Coags:     15.3   ----< 1.35    ( 25 Apr 2024 00:04 )     31.9    Urinalysis Basic - ( 25 Apr 2024 00:04 )    Color: x / Appearance: x / SG: x / pH: x  Gluc: 83 mg/dL / Ketone: x  / Bili: x / Urobili: x   Blood: x / Protein: x / Nitrite: x   Leuk Esterase: x / RBC: x / WBC x   Sq Epi: x / Non Sq Epi: x / Bacteria: x    --------------------------------------------------------------------------------------    OTHER LABS    IMAGING RESULTS  ****************      ASSESSMENT/ PLAN:  78M PMHx Crohn's, AFib/Flutter s/p DCCVs in past, remote ileocectomy and open appendectomy, admitted (6/23) for SBO vs Crohns flare requiring lap converted to open TRE, SBR x 3, left in discontinuity with abthera vac on (6/27), RTOR for ileocolic resection, small bowel anastomosis, and abdominal wall closure on (6/28), c/b fluid collection s/p IR aspiration of perihepatic fluid on (7/3), c/b wound dehiscence s/p RTOR exlap, washout, ileocolic resection  (7/5). With long and complicated hospital course due to periods of severe ELVI and hyponatremia due to dehydration, but stepped back up for to SICU on (9/10) for acute AMS, intermittent hypoglycemia, AFib with RVR. Percutaneous Cholecystostomy placed on (9/11) for enlarged GB, PCT capped 10/5 and uncapped 10/25 for hyperbilirubinemia, Right brachial DVT, left basillic and cephalic superficial thrombus on duplex 11/2, CT 11/14 with ALDEN ground glass opacity of unclear etiology, completed empiric 7day cefepime course 11/22, and another course of 7day cefepime for PNA (1/15-23), hyperbilirubinemia of unclear etiology, resolved candida glabrata fungemia, ELVI resolved, second episode of sinus pause--pacemaker placed (2/5). Perc cony removed 2/8. Discharged home 2/12/24. Patient with numerous ED presentations prior for bleeding at site of granulation tissue requiring transfusions and most recent presentation for bleeding c/b COVID PNA requiring 1 week hospital admission (discharged 4/5) who re-presents to [facility] for elective angiogram/venogram with plans to embolize vessel feeding into granulation tissue. On 4/16, patient underwent embolization of a branch of the inferior epigastric artery. Now represents with continued intermittent bleeding from wound bed.     Arrived to ED with hypotension, nontachycardic.   Improved with transfusion of 2PRBC.     Plan     No acute surgical intervention or admission required  Wound nurses will evaluate patient in AM and change dressing from wound bed to address for active bleeding which has since resolved  Please call surgery team 5 for reevaluation s/p PRBC and post transfusion labs to determine final disposition   Plan discussed with attending and chief resident on call       Attending aware and agrees with plan

## 2024-04-25 NOTE — PATIENT PROFILE ADULT - HEALTH LITERACY
[No Acute Distress] : no acute distress [Well Nourished] : well nourished [Well Developed] : well developed [Well-Appearing] : well-appearing [Normal Sclera/Conjunctiva] : normal sclera/conjunctiva [PERRL] : pupils equal round and reactive to light [EOMI] : extraocular movements intact [Normal Outer Ear/Nose] : the outer ears and nose were normal in appearance [Normal Oropharynx] : the oropharynx was normal [No JVD] : no jugular venous distention [No Lymphadenopathy] : no lymphadenopathy [Supple] : supple [Thyroid Normal, No Nodules] : the thyroid was normal and there were no nodules present [No Respiratory Distress] : no respiratory distress  [No Accessory Muscle Use] : no accessory muscle use [Clear to Auscultation] : lungs were clear to auscultation bilaterally [Normal Rate] : normal rate  [Regular Rhythm] : with a regular rhythm [Normal S1, S2] : normal S1 and S2 [No Murmur] : no murmur heard [No Carotid Bruits] : no carotid bruits [No Abdominal Bruit] : a ~M bruit was not heard ~T in the abdomen [No Varicosities] : no varicosities [Pedal Pulses Present] : the pedal pulses are present [No Edema] : there was no peripheral edema [No Palpable Aorta] : no palpable aorta [No Extremity Clubbing/Cyanosis] : no extremity clubbing/cyanosis [Soft] : abdomen soft [Non Tender] : non-tender [Non-distended] : non-distended [No Masses] : no abdominal mass palpated [No HSM] : no HSM [Normal Bowel Sounds] : normal bowel sounds [Normal Posterior Cervical Nodes] : no posterior cervical lymphadenopathy [Normal Anterior Cervical Nodes] : no anterior cervical lymphadenopathy [No CVA Tenderness] : no CVA  tenderness [No Spinal Tenderness] : no spinal tenderness [No Joint Swelling] : no joint swelling [Grossly Normal Strength/Tone] : grossly normal strength/tone [No Rash] : no rash [Coordination Grossly Intact] : coordination grossly intact [No Focal Deficits] : no focal deficits [Normal Gait] : normal gait no [Deep Tendon Reflexes (DTR)] : deep tendon reflexes were 2+ and symmetric [Normal Affect] : the affect was normal [Normal Insight/Judgement] : insight and judgment were intact

## 2024-04-25 NOTE — H&P ADULT - NSHPPHYSICALEXAM_GEN_ALL_CORE
No Vital Signs Last 24 Hrs  T(C): 37.1 (25 Apr 2024 07:17), Max: 37.1 (25 Apr 2024 02:27)  T(F): 98.8 (25 Apr 2024 07:17), Max: 98.8 (25 Apr 2024 02:27)  HR: 77 (25 Apr 2024 07:17) (70 - 89)  BP: 122/70 (25 Apr 2024 07:17) (90/56 - 130/53)  BP(mean): --  RR: 18 (25 Apr 2024 07:17) (16 - 18)  SpO2: 99% (25 Apr 2024 07:17) (97% - 100%)    Parameters below as of 25 Apr 2024 07:17    O2 Flow (L/min): 2      Physical Exam:  General: Resting in bed, NAD, jaundiced  HEENT: Sceral icterus noted. Neck supple, no JVD or lymphadenopathy appreciated  Pulmonary: Nonlabored breathing, no respiratory distress, CTA-B  Cardiovascular: Irregularly irregular  Abdominal: soft, NT/ND, midline fistula appreciated with surrounding granulating tissue - jaja bilious output noted in appliance. Mucus fistula in R abdomen with SS output  Extremities: WWP, 5/5 strength x 4, no clubbing/cyanosis/edema  Neuro: A/O x3, CNs II-XII grossly intact, normal motor/sensation, no focal deficits  Pulses: palpable distal pulses

## 2024-04-25 NOTE — H&P ADULT - NSHPLABSRESULTS_GEN_ALL_CORE
LABS:                        6.7    12.01 )-----------( 241      ( 25 Apr 2024 00:04 )             20.7     04-25    135  |  90<L>  |  58<H>  ----------------------------<  83  3.5   |  37<H>  |  1.74<H>    Ca    7.9<L>      25 Apr 2024 00:04      PT/INR - ( 25 Apr 2024 00:04 )   PT: 15.3 sec;   INR: 1.35          PTT - ( 25 Apr 2024 00:04 )  PTT:31.9 sec

## 2024-04-25 NOTE — ED PROVIDER NOTE - NS ED ROS FT
CONSTITUTIONAL: No fever, no +no fatigue  EYES: No eye redness, no visual changes  ENT: No ear pain, no sore throat  CARDIOVASCULAR: No chest pain, no palpitations  RESPIRATORY: No cough, no SOB  GI: No abdominal pain, no nausea, no vomiting, no constipation, no diarrhea  GENITOURINARY: No dysuria, no frequency, no hematuria  MUSCULOSKELETAL: No back pain, no joint pain, no myalgias  SKIN: No rash, no peripheral edema  NEURO: No headache, no confusion    ALL OTHER SYSTEMS NEGATIVE.

## 2024-04-26 NOTE — PROGRESS NOTE ADULT - ASSESSMENT
Stable this am  To discuss topical treatment for the wound to control bleeding with the surgical and ICU teams

## 2024-04-26 NOTE — DIETITIAN INITIAL EVALUATION ADULT - NSFNSGIIOFT_GEN_A_CORE
04-25-24 @ 07:01  -  04-26-24 @ 07:00  --------------------------------------------------------  OUT:  Total OUT: 0 mL    Total NET: 2102 mL

## 2024-04-26 NOTE — DIETITIAN INITIAL EVALUATION ADULT - OTHER INFO
78M PMHx Crohn's, AFib/Flutter s/p DCCVs in past, remote ileocectomy and open appendectomy, admitted (6/23) for SBO vs Crohns flare requiring lap converted to open TRE, SBR x 3, left in discontinuity with abthera vac on (6/27), RTOR for ileocolic resection, small bowel anastomosis, and abdominal wall closure on (6/28), c/b fluid collection s/p IR aspiration of perihepatic fluid on (7/3), c/b wound dehiscence s/p RTOR exlap, washout, ileocolic resection  (7/5). With long and complicated hospital course due to periods of severe ELVI and hyponatremia due to dehydration, but stepped back up for to SICU on (9/10) for acute AMS, intermittent hypoglycemia, AFib with RVR. Percutaneous Cholecystostomy placed on (9/11) for enlarged GB, PCT capped 10/5 and uncapped 10/25 for hyperbilirubinemia, Right brachial DVT, left basillic and cephalic superficial thrombus on duplex 11/2, CT 11/14 with ALDEN ground glass opacity of unclear etiology, completed empiric 7day cefepime course 11/22, and another course of 7day cefepime for PNA (1/15-23), hyperbilirubinemia of unclear etiology, resolved candida glabrata fungemia, ELVI resolved, second episode of sinus pause--pacemaker placed (2/5). Perc cony removed 2/8. Discharged home 2/12/24. Patient with numerous ED presentations prior for bleeding at site of granulation tissue requiring transfusions and most recent presentation for bleeding c/b COVID PNA requiring 1 week hospital admission (discharged 4/5) who re-presents to [facility] for elective angiogram/venogram with plans to embolize vessel feeding into granulation tissue. On 4/16, patient underwent embolization of a branch of the inferior epigastric artery. Now represents with continued intermittent bleeding from wound bed. S/p 2U PRBC in ED.     Pt assessed at bedside on 5EA. Rx and labs reviewed. Pt presents with no overt signs of nutritional wasting. Pt received resting in bed alert and participatory in nutrition assessment. Meal tray at bedside <25% consumed. Pt reports a fair appetite but poor PO intake. States at home he has been eating regularly and no significant changes to wt or appetite PTA. Pt states he is no longer taking Gatex as he feels it may be exacerbating the bleeding. TPN ordered for +/>100% of estimated needs; monitor PO intake and adjust as necessary. No other reports GI distress or further nutritional concerns at this time. RDN will continue to reassess, intervene, and monitor as appropriate.     Pain: 0 per chart review   GI: Abdomen non-distended/non-tender, +BS x4, last bowel movement PTA   Skin: Warm/Dry/Intact, ileostomy, no edema noted

## 2024-04-26 NOTE — PROGRESS NOTE ADULT - ASSESSMENT
78M well known to surgical service with PMHx of AFib/Flutter 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 Prolonged hospital course significant for recurrent AKIs, cholestatic transaminitis s/p perc cony, prior DVTs, PNAs, fungemia, sinus pauses s/p pacemaker and recurrent bleeding from fistula most recently s/p IR embolization of L branch of inferior epigastric artery via R groin access (4/16). Patient now re-presents with bleeding from fistula site.    Plan:  1U pRBC  Advance to Regular diet  OOBA  Monitor Hgb

## 2024-04-26 NOTE — PROGRESS NOTE ADULT - ATTENDING COMMENTS
AF Crohn's, acute blood loss anemia, enteroatmospheric fistula, dehydration  physical as above  POCUS with no evidence of volume overload  BUN and creatinine improved  TPN written with increased volume to 2500 ml/d  metoprolol 50 BID as rapid AF this AM  transfuse another unit PRBC

## 2024-04-26 NOTE — DIETITIAN INITIAL EVALUATION ADULT - PERTINENT LABORATORY DATA
04-26    139  |  105  |  39<H>  ----------------------------<  173<H>  3.5   |  28  |  1.31<H>    Ca    7.6<L>      26 Apr 2024 05:30  Phos  1.4     04-26  Mg     2.1     04-26    TPro  6.4  /  Alb  1.8<L>  /  TBili  4.9<H>  /  DBili  x   /  AST  281<H>  /  ALT  148<H>  /  AlkPhos  98  04-26  POCT Blood Glucose.: 99 mg/dL (04-26-24 @ 11:28)  A1C with Estimated Average Glucose Result: <4.2 % (04-17-24 @ 05:30)  A1C with Estimated Average Glucose Result: 5.1 % (09-11-23 @ 04:47)  A1C with Estimated Average Glucose Result: 4.8 % (06-27-23 @ 05:30)

## 2024-04-26 NOTE — DIETITIAN INITIAL EVALUATION ADULT - ADD RECOMMEND
-Continue parenteral nutrition support via PICC    *Continue current regimen per SICU team with 440g dextrose, 128g amino acids, and 50g SMOF lipids. This provides 2508 calories and 128 gProt. with GIR of 3.19 mg/kg/min    *Monitor PO intake and adjust parenteral nutrition support as necessary, previously on 250g Dextrose, 112g Amino Acids, and 50g SMOF per day to provide 1798 calories and 112 g Protein.    *PN over 12 hrs   *Zn and Cu x3/week    *Monitor daily BMP with Mg and Phos, daily weights, daily I/O's, and BG q6hr    *Monitor weekly serum TG 6hr post SMOF infusion   -Continue Regular diet    *Add Ottoniel BID  -Encourage good PO intake  -Honor food preferences as able  -Align nutrition with goals of care at all times

## 2024-04-26 NOTE — DIETITIAN INITIAL EVALUATION ADULT - PERTINENT MEDS FT
MEDICATIONS  (STANDING):  atorvastatin 20 milliGRAM(s) Oral at bedtime  chlorhexidine 2% Cloths 1 Application(s) Topical <User Schedule>  dextrose 10% Bolus 125 milliLiter(s) IV Bolus once  dextrose 5%. 1000 milliLiter(s) (50 mL/Hr) IV Continuous <Continuous>  dextrose 50% Injectable 25 Gram(s) IV Push once  glucagon  Injectable 1 milliGRAM(s) IntraMuscular once  insulin lispro (ADMELOG) corrective regimen sliding scale   SubCutaneous Before meals and at bedtime  levothyroxine 50 MICROGram(s) Oral daily  lipid, fat emulsion (Fish Oil and Plant Based) 20% Infusion 0.52 Gm/kG/Day (20.83 mL/Hr) IV Continuous <Continuous>  melatonin 5 milliGRAM(s) Oral at bedtime  metoprolol tartrate 50 milliGRAM(s) Oral two times a day  pancrelipase  (CREON 24,000 Lipase Units) 1 Capsule(s) Oral three times a day with meals  pantoprazole  Injectable 40 milliGRAM(s) IV Push daily  Parenteral Nutrition - Adult 1 Each TPN Continuous <Continuous>  Parenteral Nutrition - Adult 1 Each TPN Continuous <Continuous>  sodium chloride 0.9%. 1000 milliLiter(s) (90 mL/Hr) IV Continuous <Continuous>  venlafaxine XR. 300 milliGRAM(s) Oral daily    MEDICATIONS  (PRN):  acetaminophen     Tablet .. 650 milliGRAM(s) Oral every 6 hours PRN Mild Pain (1 - 3)  dextrose Oral Gel 15 Gram(s) Oral once PRN Blood Glucose LESS THAN 70 milliGRAM(s)/deciliter

## 2024-04-26 NOTE — PROGRESS NOTE ADULT - ASSESSMENT
78M well known to surgical service with PMHx of AFib/Flutter 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 Prolonged hospital course significant for recurrent AKIs, cholestatic transaminitis s/p perc cony, prior DVTs, PNAs, fungemia, sinus pauses s/p pacemaker and recurrent bleeding from fistula most recently s/p IR embolization of L branch of inferior epigastric artery via R groin access (4/16). Patient now re-presents with bleeding from fistula site.  In the ED, noted to develop hypotension depsite 2 u PRBC with appropriate Hb response (6.4 --> 8.3) requiring initiation of vasopressor support. Transferred to the SICU for further monitoring.     Neuro: No pain or nausea at this time. A&Ox3 Hx of depression - continue home Venlafaxine  HEENT: No concerns  CV: Goal maintain MAP >65. Transient hypotension requiring levophed likely related to hypovolemia in setting of fistula output and bleeding. Now s/p 1L NS and mIVF. Off levophed. AFib/Flutter s/p DCCVs, as well as sinus pauses s/p pacemaker (2/5) - not on AC at home, continue home Lopresor. Hx of HLD - continue rosuvastatin. Appears euvolemic, well perfused this AM. May add volume to TPN in setting of persistent fluid need.  Pulm: Goal maintain saturation > 94%. Recent admission for COVID PNA. No active pulmonary concerns.  GI: Regular diet/TPN. Hx of Crohn's SBO s/p ex lap with numerous SBR and RTOR for dehiscence c/b entero-atmospheric fistula. Numerous admissions for bleeding fistula requiring transfusions - s/p 2 u PRBC this admission with bleeding resolved. Hx of pancreatic insufficiency - continue Creon  : Voids. ELVI on presentation likely 2/2 hypovolemia. BUN/Cr improving this AM after resuscitation.  ID: Not currently indicated. Monitor leukocytosis and fever curve. F/U BCx  Endo: mISS. Hx of hypothyroidism - continue home synthroid.  Heme/PPx: SCDs. Holding lovenox in setting of bleeding from fistula  Wounds/Lines/Drains: Ileostomy, Fistula, RUE PICC  PT/OT: Ordered  Dispo: SICU, possible home today 78M well known to surgical service with PMHx of AFib/Flutter 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 Prolonged hospital course significant for recurrent AKIs, cholestatic transaminitis s/p perc cony, prior DVTs, PNAs, fungemia, sinus pauses s/p pacemaker and recurrent bleeding from fistula most recently s/p IR embolization of L branch of inferior epigastric artery via R groin access (4/16). Patient now re-presents with bleeding from fistula site.  In the ED, noted to develop hypotension depsite 2 u PRBC with appropriate Hb response (6.4 --> 8.3) requiring initiation of vasopressor support. Transferred to the SICU for further monitoring.     Neuro: No pain or nausea at this time. A&Ox3 Hx of depression - continue home Venlafaxine  HEENT: No concerns  CV: Goal maintain MAP >65. Transient hypotension requiring levophed likely related to hypovolemia in setting of fistula output and bleeding. Now s/p 1L NS and mIVF. Off levophed. AFib/Flutter s/p DCCVs, as well as sinus pauses s/p pacemaker (2/5) - not on AC at home - now in AFib -120s - will increased lopressor to 50 BID. Hx of HLD - continue rosuvastatin. Appears euvolemic, well perfused this AM. May add volume to TPN in setting of persistent fluid need.  Pulm: Goal maintain saturation > 94%. Recent admission for COVID PNA. No active pulmonary concerns.  GI: Regular diet/TPN. Hx of Crohn's SBO s/p ex lap with numerous SBR and RTOR for dehiscence c/b entero-atmospheric fistula. Numerous admissions for bleeding fistula requiring transfusions - s/p 2 u PRBC this admission with bleeding resolved. Hx of pancreatic insufficiency - continue Creon  : Voids. ELVI on presentation likely 2/2 hypovolemia. BUN/Cr improving this AM after resuscitation.  ID: Not currently indicated. Monitor leukocytosis and fever curve. F/U BCx  Endo: mISS. Hx of hypothyroidism - continue home synthroid.  Heme/PPx: SCDs. Holding lovenox in setting of bleeding from fistula. Repeat CBC in PM, additional 1 u PRBC this AM  Wounds/Lines/Drains: Ileostomy, Fistula, RUE PICC  PT/OT: Ordered  Dispo: SICU

## 2024-04-27 NOTE — PROGRESS NOTE ADULT - TIME BILLING
med rec performed  labs reviewed  TPN written  d/w surgery and GI
med rec performed  labs reviewed  2 sets of ICU rounds  POCUS performed

## 2024-04-27 NOTE — PROGRESS NOTE ADULT - ASSESSMENT
78M well known to surgical service with PMHx of AFib/Flutter 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 Prolonged hospital course significant for recurrent AKIs, cholestatic transaminitis s/p perc cony, prior DVTs, PNAs, fungemia, sinus pauses s/p pacemaker and recurrent bleeding from fistula most recently s/p IR embolization of L branch of inferior epigastric artery via R groin access (4/16). Patient now re-presents with bleeding from fistula site.  In the ED, noted to develop hypotension depsite 2 u PRBC with appropriate Hb response (6.4 --> 8.3) requiring initiation of vasopressor support. Transferred to the SICU for further monitoring.     Neuro: No pain or nausea at this time. A&Ox3 Hx of depression - continue home Venlafaxine  HEENT: No concerns  CV: Goal maintain MAP >65. Transient hypotension requiring levophed likely related to hypovolemia in setting of fistula output and bleeding. Now off levophed. AFib/Flutter s/p DCCVs, as well as sinus pauses s/p pacemaker (2/5) - not on AC at home - now in AFib -120s - will increase lopressor to 50 TID. Hx of HLD - continue rosuvastatin. Increased bloody output from ileostomy this am, receiving 1uPRBC.  Pulm: Goal maintain saturation > 94%. Recent admission for COVID PNA. No active pulmonary concerns.  GI: Regular diet/TPN. Hx of Crohn's SBO s/p ex lap with numerous SBR and RTOR for dehiscence c/b entero-atmospheric fistula. Numerous admissions for bleeding fistula requiring transfusions - s/p 3 u PRBC this admission. Pending scope for increased bleeding from ileostomy this am. Hx of pancreatic insufficiency - continue Creon  : Voids. ELVI on presentation likely 2/2 hypovolemia, improved after resuscitation.  ID: Not currently indicated. Monitor leukocytosis and fever curve. F/U BCx  Endo: mISS. Hx of hypothyroidism - continue home synthroid.  Heme/PPx: SCDs. Holding lovenox in setting of bleeding from fistula.   Wounds/Lines/Drains: Ileostomy, Fistula, RUE PICC  PT/OT: Ordered  Dispo: SICU

## 2024-04-27 NOTE — PROGRESS NOTE ADULT - ASSESSMENT
78M well known to surgical service with PMHx of AFib/Flutter 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 Prolonged hospital course significant for recurrent AKIs, cholestatic transaminitis s/p perc cony, prior DVTs, PNAs, fungemia, sinus pauses s/p pacemaker and recurrent bleeding from fistula most recently s/p IR embolization of L branch of inferior epigastric artery via R groin access (4/16). Patient now re-presents with bleeding from fistula site.    Plan:  1U pRBC  Continue to Regular diet  OOBA  Monitor Hgb  Scope

## 2024-04-27 NOTE — PROGRESS NOTE ADULT - ASSESSMENT
discussed case with Dr Trejo I think that scoping via the stoma is reasonable to try to find out why it bled  He says it had been intermittently bleeding and it does not bother him much  he request to defer scope at this time  will monitor his progress

## 2024-04-28 NOTE — PROGRESS NOTE ADULT - PROVIDER SPECIALTY LIST ADULT
Colorectal Surgery
Surgery
Surgery
Gastroenterology
Internal Medicine
Internal Medicine
SICU
Internal Medicine
SICU
SICU

## 2024-04-28 NOTE — PROGRESS NOTE ADULT - REASON FOR ADMISSION
Bleeding from wound bed

## 2024-04-28 NOTE — PROGRESS NOTE ADULT - NS ATTEND AMEND GEN_ALL_CORE FT
Patient is being discharged home
AF, Crohn's, enteroatmospheric fistula, ileostomy  physical as above  T to 99.7 rectally  slightly tachypneic  remains tachycardic  increase metoprolol to 50 mg q 8h for now  blood from ileostomy; will ask GI to evaluate for potential ileoscopy to look for evidence of Crohn's flare  TPN written

## 2024-04-28 NOTE — DISCHARGE NOTE PROVIDER - HOSPITAL COURSE
78M PMHx Crohn's, AFib/Flutter s/p DCCVs in past, remote ileocectomy and open appendectomy, admitted (6/23) for SBO vs Crohns flare requiring lap converted to open TRE, SBR x 3, left in discontinuity with abthera vac on (6/27), RTOR for ileocolic resection, small bowel anastomosis, and abdominal wall closure on (6/28), c/b fluid collection s/p IR aspiration of perihepatic fluid on (7/3), c/b wound dehiscence s/p RTOR exlap, washout, ileocolic resection  (7/5). With long and complicated hospital course due to periods of severe ELVI and hyponatremia due to dehydration, but stepped back up for to SICU on (9/10) for acute AMS, intermittent hypoglycemia, AFib with RVR. Percutaneous Cholecystostomy placed on (9/11) for enlarged GB, PCT capped 10/5 and uncapped 10/25 for hyperbilirubinemia, Right brachial DVT, left basillic and cephalic superficial thrombus on duplex 11/2, CT 11/14 with ALDEN ground glass opacity of unclear etiology, completed empiric 7day cefepime course 11/22, and another course of 7day cefepime for PNA (1/15-23), hyperbilirubinemia of unclear etiology, resolved candida glabrata fungemia, ELVI resolved, second episode of sinus pause--pacemaker placed (2/5). Perc cony removed 2/8. Discharged home 2/12/24. Patient with numerous ED presentations prior for bleeding at site of granulation tissue requiring transfusions and most recent presentation for bleeding c/b COVID PNA requiring 1 week hospital admission (discharged 4/5) who re-presents to [facility] for elective angiogram/venogram with plans to embolize vessel feeding into granulation tissue. On 4/16, patient underwent embolization of a branch of the inferior epigastric artery. Now represents with continued intermittent bleeding from wound bed.   In the ED, noted to develop hypotension after 2u pRBC with appropriate Hgb response (6.4 --> 8.3) requiring vasopressor support. The patient was transferred to the SICU for hemodynamic monitoring and was weaned off of levophed once appropriately fluid resuscitated. Bleeding from ileostomy resolved spontaneously, thus decision for endoscopy was deferred to outpatient. His hospital course has been unremarkable with resolution of symptoms, advancement of diet and bleeding control. On day of discharge patient was stable to be d/c'd home with home TPN services reinstated.

## 2024-04-28 NOTE — DISCHARGE NOTE NURSING/CASE MANAGEMENT/SOCIAL WORK - NSDCVIVACCINE_GEN_ALL_CORE_FT
influenza, high-dose, quadrivalent; 23-Jan-2024 11:29; Deandra Mackay (RN); [employer]; [id number] (Exp. Date: 30-Jun-2024); IntraMuscular; Deltoid Left.; 0.7 milliLiter(s); VIS (VIS Published: 06-Aug-2021, VIS Presented: 23-Jan-2024);

## 2024-04-28 NOTE — DISCHARGE NOTE PROVIDER - PROVIDER TOKENS
PROVIDER:[TOKEN:[[id number]]],PROVIDER:[TOKEN:[[id number]]],PROVIDER:[TOKEN:[[id number]]]

## 2024-04-28 NOTE — DISCHARGE NOTE PROVIDER - NSDCMRMEDTOKEN_GEN_ALL_CORE_FT
allopurinol 300 mg oral tablet: 1 tab(s) orally once a day  intravenous electrolyte (Lypholyte II/Nutrilyte II/TPN Electrolytes) solution: 1 each intravenous once a day  Lopressor 50 mg oral tablet: 1 tab(s) orally once a day  pancrelipase 24,000 units-76,000 units-120,000 units oral delayed release capsule: 1 cap(s) orally 3 times a day (with meals)  rosuvastatin 5 mg oral tablet: 1 tab(s) orally once a day  Synthroid 50 mcg (0.05 mg) oral tablet: 1 tab(s) orally once a day  venlafaxine 150 mg oral capsule, extended release: 2 cap(s) orally once a day   allopurinol 300 mg oral tablet: 1 tab(s) orally once a day  fat emulsion with fish, medium chain, olive, and soy oil 20% intravenous emulsion: 50 gram(s) intravenous once a day  intravenous electrolyte (Lypholyte II/Nutrilyte II/TPN Electrolytes) solution: 1 each intravenous once a day  Lopressor 50 mg oral tablet: 1 tab(s) orally once a day  metoprolol tartrate 50 mg oral tablet: 1 tab(s) orally every 8 hours  pancrelipase 24,000 units-76,000 units-120,000 units oral delayed release capsule: 1 cap(s) orally 3 times a day (with meals)  rosuvastatin 5 mg oral tablet: 1 tab(s) orally once a day  Synthroid 50 mcg (0.05 mg) oral tablet: 1 tab(s) orally once a day  venlafaxine 150 mg oral capsule, extended release: 2 cap(s) orally once a day

## 2024-04-28 NOTE — DISCHARGE NOTE PROVIDER - CARE PROVIDER_API CALL
Rikki Peterson  Colon/Rectal Surgery  [address], [address]  [place] [address]  Phone: [phone]  Fax: [phone]  Follow Up Time:     River Yuan  Gastroenterology  [address], [address]  [place] [address]  Phone: [phone]  Fax: [phone]  Follow Up Time:     Daniel Aguiar  Internal Medicine  [address] [address]  Phone: [phone]  Fax: [phone]  Follow Up Time:

## 2024-04-28 NOTE — DISCHARGE NOTE PROVIDER - CARE PROVIDERS DIRECT ADDRESSES
,DirectAddress_Unknown,[email or web address],DirectAddress_Unknown

## 2024-04-28 NOTE — DISCHARGE NOTE NURSING/CASE MANAGEMENT/SOCIAL WORK - NSDCPEFALRISK_GEN_ALL_CORE
For information on Fall & Injury Prevention, visit: [email or web address] OR  [email or web address] OR  https://www.cdc.gov/steadi/patient.html

## 2024-04-28 NOTE — PROGRESS NOTE ADULT - SUBJECTIVE AND OBJECTIVE BOX
called because of bleed from ileostomy with clots and fresh blood this am  currently ceased  old dark liquid blood draining (small amount)  also transiently the mucus fistula output was clear red as well  Pt seen and examined     REVIEW OF SYSTEMS:  Constitutional: No fever,   Cardiovascular: No chest pain, palpitations, dizziness    Gastrointestinal: No abdominal or epigastric pain. No nausea, vomiting  .  Skin: No itching, burning, rashes or lesions       MEDICATIONS:  MEDICATIONS  (STANDING):  atorvastatin 20 milliGRAM(s) Oral at bedtime  chlorhexidine 2% Cloths 1 Application(s) Topical <User Schedule>  dextrose 10% Bolus 125 milliLiter(s) IV Bolus once  dextrose 5%. 1000 milliLiter(s) (50 mL/Hr) IV Continuous <Continuous>  dextrose 50% Injectable 25 Gram(s) IV Push once  glucagon  Injectable 1 milliGRAM(s) IntraMuscular once  insulin lispro (ADMELOG) corrective regimen sliding scale   SubCutaneous Before meals and at bedtime  levothyroxine 50 MICROGram(s) Oral daily  lipid, fat emulsion (Fish Oil and Plant Based) 20% Infusion 0.52 Gm/kG/Day (20.83 mL/Hr) IV Continuous <Continuous>  melatonin 5 milliGRAM(s) Oral at bedtime  metoprolol tartrate 50 milliGRAM(s) Oral every 8 hours  pancrelipase  (CREON 24,000 Lipase Units) 1 Capsule(s) Oral three times a day with meals  pantoprazole  Injectable 40 milliGRAM(s) IV Push two times a day  Parenteral Nutrition - Adult 1 Each TPN Continuous <Continuous>  Parenteral Nutrition - Adult 1 Each TPN Continuous <Continuous>  venlafaxine XR. 300 milliGRAM(s) Oral daily    MEDICATIONS  (PRN):  acetaminophen     Tablet .. 650 milliGRAM(s) Oral every 6 hours PRN Mild Pain (1 - 3)  dextrose Oral Gel 15 Gram(s) Oral once PRN Blood Glucose LESS THAN 70 milliGRAM(s)/deciliter      Allergies    penicillin (Angioedema)    Intolerances        Vital Signs Last 24 Hrs  T(C): 37.5 (27 Apr 2024 09:00), Max: 37.5 (27 Apr 2024 02:00)  T(F): 99.5 (27 Apr 2024 09:00), Max: 99.5 (27 Apr 2024 02:00)  HR: 89 (27 Apr 2024 13:00) (84 - 117)  BP: 107/60 (27 Apr 2024 13:00) (100/53 - 142/67)  BP(mean): 78 (27 Apr 2024 13:00) (68 - 96)  RR: 23 (27 Apr 2024 13:00) (18 - 32)  SpO2: 94% (27 Apr 2024 13:00) (93% - 100%)    Parameters below as of 27 Apr 2024 13:00  Patient On (Oxygen Delivery Method): nasal cannula  O2 Flow (L/min): 2      04-26 @ 07:01 - 04-27 @ 07:00  --------------------------------------------------------  IN: 5621.6 mL / OUT: 2395 mL / NET: 3226.6 mL    04-27 @ 07:01 - 04-27 @ 15:53  --------------------------------------------------------  IN: 628 mL / OUT: 500 mL / NET: 128 mL        PHYSICAL EXAM:    General:  ; in no acute distress  HEENT: MMM, conjunctiva and sclera clear  Gastrointestinal: Soft non-tender non-distended; Normal bowel sounds; ileostomy bag with small amount of dark red blood, the bag from mucus fistula is clear after that brief period  Skin: Warm and dry. No obvious rash    LABS:      CBC Full  -  ( 27 Apr 2024 05:02 )  WBC Count : 7.15 K/uL  RBC Count : 2.62 M/uL  Hemoglobin : 8.0 g/dL  Hematocrit : 24.6 %  Platelet Count - Automated : 184 K/uL  Mean Cell Volume : 93.9 fl  Mean Cell Hemoglobin : 30.5 pg  Mean Cell Hemoglobin Concentration : 32.5 gm/dL  Auto Neutrophil # : x  Auto Lymphocyte # : x  Auto Monocyte # : x  Auto Eosinophil # : x  Auto Basophil # : x  Auto Neutrophil % : x  Auto Lymphocyte % : x  Auto Monocyte % : x  Auto Eosinophil % : x  Auto Basophil % : x    04-27    138  |  104  |  36<H>  ----------------------------<  180<H>  3.8   |  26  |  1.37<H>    Ca    7.6<L>      27 Apr 2024 05:02  Phos  2.5     04-27  Mg     2.0     04-27    TPro  5.8<L>  /  Alb  1.9<L>  /  TBili  5.3<H>  /  DBili  x   /  AST  290<H>  /  ALT  159<H>  /  AlkPhos  92  04-27          Urinalysis Basic - ( 27 Apr 2024 05:02 )    Color: x / Appearance: x / SG: x / pH: x  Gluc: 180 mg/dL / Ketone: x  / Bili: x / Urobili: x   Blood: x / Protein: x / Nitrite: x   Leuk Esterase: x / RBC: x / WBC x   Sq Epi: x / Non Sq Epi: x / Bacteria: x                RADIOLOGY & ADDITIONAL STUDIES (The following images were personally reviewed):
Awake and alert Slight tachypnea VS stable Exam stable  Active bleeding from ileostomy   None from fistulaous tract  Oozing around fistula  Hgb decreased
INTERVAL/OVERNIGHT EVENTS: Multiple appliance changes due to small amount of bleeding and leak. Continued tachycardia to low 110s. This am with increased bloody output from ostomy, given 1uPRBC.    SUBJECTIVE: Pt seen this am at bedside. Endorses some shortness of breath. Denies headache, nausea, chills, chest pain, wheezes, abdominal pain, extremity weakness.       Neurologic Medications  acetaminophen     Tablet .. 650 milliGRAM(s) Oral every 6 hours PRN Mild Pain (1 - 3)  melatonin 5 milliGRAM(s) Oral at bedtime  venlafaxine XR. 300 milliGRAM(s) Oral daily    Cardiovascular Medications  metoprolol tartrate 50 milliGRAM(s) Oral every 8 hours    Gastrointestinal Medications  dextrose 10% Bolus 125 milliLiter(s) IV Bolus once  dextrose 5%. 1000 milliLiter(s) IV Continuous <Continuous>  lipid, fat emulsion (Fish Oil and Plant Based) 20% Infusion 0.52 Gm/kG/Day IV Continuous <Continuous>  lipid, fat emulsion (Fish Oil and Plant Based) 20% Infusion 0.52 Gm/kG/Day IV Continuous <Continuous>  pancrelipase  (CREON 24,000 Lipase Units) 1 Capsule(s) Oral three times a day with meals  pantoprazole  Injectable 40 milliGRAM(s) IV Push two times a day  Parenteral Nutrition - Adult 1 Each TPN Continuous <Continuous>  Parenteral Nutrition - Adult 1 Each TPN Continuous <Continuous>  potassium phosphate / sodium phosphate Tablet (K-PHOS No. 2) 1 Tablet(s) Oral every 4 hours    Endocrine/Metabolic Medications  atorvastatin 20 milliGRAM(s) Oral at bedtime  dextrose 50% Injectable 25 Gram(s) IV Push once  dextrose Oral Gel 15 Gram(s) Oral once PRN Blood Glucose LESS THAN 70 milliGRAM(s)/deciliter  glucagon  Injectable 1 milliGRAM(s) IntraMuscular once  insulin lispro (ADMELOG) corrective regimen sliding scale   SubCutaneous Before meals and at bedtime  levothyroxine 50 MICROGram(s) Oral daily    Topical/Other Medications  chlorhexidine 2% Cloths 1 Application(s) Topical <User Schedule>      MEDICATIONS  (PRN):  acetaminophen     Tablet .. 650 milliGRAM(s) Oral every 6 hours PRN Mild Pain (1 - 3)  dextrose Oral Gel 15 Gram(s) Oral once PRN Blood Glucose LESS THAN 70 milliGRAM(s)/deciliter      I&O's Detail    26 Apr 2024 07:01  -  27 Apr 2024 07:00  --------------------------------------------------------  IN:    Fat Emulsion (Fish Oil &amp; Plant Based) 20% Infusion: 249.6 mL    IV PiggyBack: 250 mL    Oral Fluid: 1030 mL    PRBCs (Packed Red Blood Cells): 300 mL    sodium chloride 0.9%: 1440 mL    TPN (Total Parenteral Nutrition): 2352 mL  Total IN: 5621.6 mL    OUT:    Drain (mL): 1270 mL    Drain (mL): 225 mL    Voided (mL): 900 mL  Total OUT: 2395 mL    Total NET: 3226.6 mL      27 Apr 2024 07:01  -  27 Apr 2024 09:57  --------------------------------------------------------  IN:    TPN (Total Parenteral Nutrition): 392 mL  Total IN: 392 mL    OUT:    Fat Emulsion (Fish Oil &amp; Plant Based) 20% Infusion: 0 mL    Voided (mL): 100 mL  Total OUT: 100 mL    Total NET: 292 mL          Vital Signs Last 24 Hrs  T(C): 37.5 (27 Apr 2024 09:00), Max: 37.5 (27 Apr 2024 02:00)  T(F): 99.5 (27 Apr 2024 09:00), Max: 99.5 (27 Apr 2024 02:00)  HR: 92 (27 Apr 2024 09:33) (84 - 117)  BP: 104/50 (27 Apr 2024 09:00) (91/53 - 142/67)  BP(mean): 69 (27 Apr 2024 09:00) (68 - 96)  RR: 25 (27 Apr 2024 09:33) (10 - 32)  SpO2: 96% (27 Apr 2024 09:33) (93% - 100%)    Parameters below as of 27 Apr 2024 09:33  Patient On (Oxygen Delivery Method): nasal cannula  O2 Flow (L/min): 2      GENERAL: NAD, resting comfortably in bed  HEENT: NCAT, MMM  C/V: Tachycardic to low 110s, regular rhythm without murmurs.   PULM: Mild increased work of breathing, no respiratory distress. Lungs clear to auscultation, diminished at bases bilaterally  ABD: Soft, ND, NT, no rebound tenderness, no guarding. Midline fistula with viscous green mixed with bloody output.   EXTREM: WWP, no edema, SCDs in place. RUE PICC without erythema or edema  NEURO: No focal deficits    LABS:                        8.0    7.15  )-----------( 184      ( 27 Apr 2024 05:02 )             24.6     04-27    138  |  104  |  36<H>  ----------------------------<  180<H>  3.8   |  26  |  1.37<H>    Ca    7.6<L>      27 Apr 2024 05:02  Phos  2.5     04-27  Mg     2.0     04-27    TPro  5.8<L>  /  Alb  1.9<L>  /  TBili  5.3<H>  /  DBili  x   /  AST  290<H>  /  ALT  159<H>  /  AlkPhos  92  04-27    PT/INR - ( 25 Apr 2024 11:02 )   PT: 15.1 sec;   INR: 1.34          PTT - ( 25 Apr 2024 11:02 )  PTT:36.3 sec  Urinalysis Basic - ( 27 Apr 2024 05:02 )    Color: x / Appearance: x / SG: x / pH: x  Gluc: 180 mg/dL / Ketone: x  / Bili: x / Urobili: x   Blood: x / Protein: x / Nitrite: x   Leuk Esterase: x / RBC: x / WBC x   Sq Epi: x / Non Sq Epi: x / Bacteria: x        RADIOLOGY & ADDITIONAL STUDIES:      Culture - Blood (collected 04-25-24 @ 11:02)  Source: .Blood Blood-Peripheral  Preliminary Report (04-26-24 @ 18:02):    No growth at 24 hours    Culture - Blood (collected 04-25-24 @ 11:02)  Source: .Blood Blood-Peripheral  Preliminary Report (04-26-24 @ 18:02):    No growth at 24 hours    
Patient seen and examined (with Dr. Ortega PGY 6) and discussed with Dr. Holley.  Received second unit of PRBC yesterday, Hb 8.3 this morning.  Patient refused a scope through the stoma yesterday, and there has been no further large volume of clot or blood via the stoma since yesterday.  No bleeding for the time being around the EC fistula.  Discharge planning with attempts to set up outpatient transfusion schedule to hopefully avoid short interval return to the hospital.  Dr. Peterson is back tomorrow AM.
Patient seen and examined along with Grant Ortega (PGY6), Arun (PGY 4) and Venus (PGY 1).  Fibrin patch applied yesterday, dressing required replacement/adjustment 3 times since.  Received 1 U PRBC with Hb going from 7.4 to 8.4, now 8.0 this morning.  Patient is very clear that he wants to return home today as he had agreed with Dr. Peterson, understanding the risks involved from the periodic bleeding that he still gets from the wound around the EC fistula.  Will give an additional unit of blood prior to him leaving to provide some cushion against periodic blood loss.
No complaints this am VS table Afeb Exam stable
SICU PROGRESS NOTE    ON: Feurea intrinsic. Very teary-eyed in the beginning of the night. Multiple bleeders beneath the new fistula dressings. Dressing taken down. Pressure held for a long period of time, silver nitrate used. New ostomy appliance applied. Large dark blood clot from the ileostomy. CBC sent. Repeat Hgb 8.0(8.5) ordered PPI.       SUBJECTIVE: Pt seen and examined at bedside this am by ICU team. Patient is lying in bed, in no acute distress. Denies abdominal pain, fevers or chills. Admits to mild SOB. Denies chest pain.      MEDICATIONS  (STANDING):  chlorhexidine 2% Cloths 1 Application(s) Topical <User Schedule>  dextrose 10% Bolus 125 milliLiter(s) IV Bolus once  dextrose 5%. 1000 milliLiter(s) (50 mL/Hr) IV Continuous <Continuous>  dextrose 50% Injectable 25 Gram(s) IV Push once  glucagon  Injectable 1 milliGRAM(s) IntraMuscular once  insulin lispro (ADMELOG) corrective regimen sliding scale   SubCutaneous Before meals and at bedtime  levothyroxine 50 MICROGram(s) Oral daily  lipid, fat emulsion (Fish Oil and Plant Based) 20% Infusion 0.5247 Gm/kG/Day (20.8 mL/Hr) IV Continuous <Continuous>  melatonin 5 milliGRAM(s) Oral at bedtime  metoprolol tartrate 50 milliGRAM(s) Oral daily  pantoprazole  Injectable 40 milliGRAM(s) IV Push daily  Parenteral Nutrition - Adult 1 Each TPN Continuous <Continuous>  potassium phosphate IVPB 21 milliMole(s) IV Intermittent once  sodium chloride 0.9%. 1000 milliLiter(s) (90 mL/Hr) IV Continuous <Continuous>  venlafaxine XR. 300 milliGRAM(s) Oral daily    MEDICATIONS  (PRN):  acetaminophen     Tablet .. 650 milliGRAM(s) Oral every 6 hours PRN Mild Pain (1 - 3)  dextrose Oral Gel 15 Gram(s) Oral once PRN Blood Glucose LESS THAN 70 milliGRAM(s)/deciliter    ICU Vital Signs Last 24 Hrs  T(C): 37.2 (26 Apr 2024 06:22), Max: 37.2 (26 Apr 2024 00:45)  T(F): 98.9 (26 Apr 2024 06:22), Max: 99 (26 Apr 2024 00:45)  HR: 81 (26 Apr 2024 07:00) (62 - 98)  BP: 147/67 (26 Apr 2024 07:00) (62/24 - 160/68)  BP(mean): 96 (26 Apr 2024 07:00) (78 - 110)  RR: 18 (26 Apr 2024 07:00) (16 - 20)  SpO2: 95% (26 Apr 2024 07:00) (93% - 100%)    O2 Parameters below as of 26 Apr 2024 08:00  Patient On (Oxygen Delivery Method): room air            Physical Exam:  General: Resting in bed, NAD  HEENT: NC/AT, Scleral icterus noted. Mucus membranes slightly dry  Pulmonary: Lungs clear to auscultation bilterally. No respiratory distress  Cardiovascular: NSR, no murmurs  Abdominal: soft, NT/ND, +BS, no organomegaly. Midline fistula noted with viscous green output. No bleeding noted. R abdominal ileostomy with blood tinged output in appliance  Extremities: WWP, 5/5 strength x 4, no clubbing/cyanosis/edema. RUE PICC without erythema or edema  Neuro: A/O x3, CNs II-XII grossly intact, normal motor/sensation, no focal deficits  Pulses: palpable distal pulses    I&O's Summary    25 Apr 2024 07:01  -  26 Apr 2024 07:00  --------------------------------------------------------  IN: 4009.6 mL / OUT: 2415 mL / NET: 1594.6 mL    26 Apr 2024 07:01  -  26 Apr 2024 07:49  --------------------------------------------------------  IN: 90 mL / OUT: 0 mL / NET: 90 mL        LABS:                        7.5    7.79  )-----------( 171      ( 26 Apr 2024 05:30 )             23.6     04-26    139  |  105  |  39<H>  ----------------------------<  173<H>  3.5   |  28  |  1.31<H>    Ca    7.6<L>      26 Apr 2024 05:30  Phos  1.4     04-26  Mg     2.1     04-26    TPro  6.4  /  Alb  1.8<L>  /  TBili  4.9<H>  /  DBili  x   /  AST  281<H>  /  ALT  148<H>  /  AlkPhos  98  04-26    PT/INR - ( 25 Apr 2024 11:02 )   PT: 15.1 sec;   INR: 1.34          PTT - ( 25 Apr 2024 11:02 )  PTT:36.3 sec  Urinalysis Basic - ( 26 Apr 2024 05:30 )    Color: x / Appearance: x / SG: x / pH: x  Gluc: 173 mg/dL / Ketone: x  / Bili: x / Urobili: x   Blood: x / Protein: x / Nitrite: x   Leuk Esterase: x / RBC: x / WBC x   Sq Epi: x / Non Sq Epi: x / Bacteria: x      CAPILLARY BLOOD GLUCOSE      POCT Blood Glucose.: 167 mg/dL (26 Apr 2024 06:52)  POCT Blood Glucose.: 137 mg/dL (25 Apr 2024 21:38)  POCT Blood Glucose.: 123 mg/dL (25 Apr 2024 16:34)    LIVER FUNCTIONS - ( 26 Apr 2024 05:30 )  Alb: 1.8 g/dL / Pro: 6.4 g/dL / ALK PHOS: 98 U/L / ALT: 148 U/L / AST: 281 U/L / GGT: x               
SUBJECTIVE: Patient seen and examined at bedside with attending. Patient adamantly states that he would like to return home today. He denies nausea or vomiting. He states that the bleeding noted in his ileostomy this morning has been occurring to this extent periodically over the last few months.      metoprolol tartrate 50 milliGRAM(s) Oral every 8 hours      Vital Signs Last 24 Hrs  T(C): 37.5 (27 Apr 2024 09:00), Max: 37.5 (27 Apr 2024 02:00)  T(F): 99.5 (27 Apr 2024 09:00), Max: 99.5 (27 Apr 2024 02:00)  HR: 92 (27 Apr 2024 09:33) (84 - 117)  BP: 104/50 (27 Apr 2024 09:00) (91/53 - 142/67)  BP(mean): 69 (27 Apr 2024 09:00) (68 - 96)  RR: 25 (27 Apr 2024 09:33) (10 - 32)  SpO2: 96% (27 Apr 2024 09:33) (93% - 100%)    Parameters below as of 27 Apr 2024 09:33  Patient On (Oxygen Delivery Method): nasal cannula  O2 Flow (L/min): 2    I&O's Detail    26 Apr 2024 07:01  -  27 Apr 2024 07:00  --------------------------------------------------------  IN:    Fat Emulsion (Fish Oil &amp; Plant Based) 20% Infusion: 249.6 mL    IV PiggyBack: 250 mL    Oral Fluid: 1030 mL    PRBCs (Packed Red Blood Cells): 300 mL    sodium chloride 0.9%: 1440 mL    TPN (Total Parenteral Nutrition): 2352 mL  Total IN: 5621.6 mL    OUT:    Drain (mL): 1270 mL    Drain (mL): 225 mL    Voided (mL): 900 mL  Total OUT: 2395 mL    Total NET: 3226.6 mL      27 Apr 2024 07:01  -  27 Apr 2024 09:55  --------------------------------------------------------  IN:    TPN (Total Parenteral Nutrition): 392 mL  Total IN: 392 mL    OUT:    Fat Emulsion (Fish Oil &amp; Plant Based) 20% Infusion: 0 mL    Voided (mL): 100 mL  Total OUT: 100 mL    Total NET: 292 mL          General: NAD, resting comfortably in bed  C/V: NSR  Pulm: Nonlabored breathing, no respiratory distress  Abd: soft, nontender, ileostomy with large clot noted in bag streaks of dark blood, fistula with bilious output and bleeding noted at base.  Extrem: WWP, no edema, SCDs in place        LABS:                        8.0    7.15  )-----------( 184      ( 27 Apr 2024 05:02 )             24.6     04-27    138  |  104  |  36<H>  ----------------------------<  180<H>  3.8   |  26  |  1.37<H>    Ca    7.6<L>      27 Apr 2024 05:02  Phos  2.5     04-27  Mg     2.0     04-27    TPro  5.8<L>  /  Alb  1.9<L>  /  TBili  5.3<H>  /  DBili  x   /  AST  290<H>  /  ALT  159<H>  /  AlkPhos  92  04-27    PT/INR - ( 25 Apr 2024 11:02 )   PT: 15.1 sec;   INR: 1.34          PTT - ( 25 Apr 2024 11:02 )  PTT:36.3 sec  Urinalysis Basic - ( 27 Apr 2024 05:02 )    Color: x / Appearance: x / SG: x / pH: x  Gluc: 180 mg/dL / Ketone: x  / Bili: x / Urobili: x   Blood: x / Protein: x / Nitrite: x   Leuk Esterase: x / RBC: x / WBC x   Sq Epi: x / Non Sq Epi: x / Bacteria: x        RADIOLOGY & ADDITIONAL STUDIES:  
dressing care discussed with wound team, ICU team and surgical residents  will try fibrin glue product for hemostasis when it is available  VSS  Afebrile  some  blood from wound and ileostomy  will monitor labs  regular diet  TPN
INTERVAL HPI/OVERNIGHT EVENTS: Feurea intrinsic. Very teary-eyed in the beginning of the night. Multiple bleeders beneath the new fistula dressings. Dressing taken down. Pressure held for a long period of time, silver nitrate used. New ostomy appliance applied. Large dark blood clot from the ileostomy. CBC sent. Repeat Hgb 8.0(8.5) ordered PPI.     SUBJECTIVE: Patient seen and examined at bedside with chief resident. Patient without any acute complaints this am. Denies dizziness or lightheadedness, endorses mild shortness of breath.      metoprolol tartrate 50 milliGRAM(s) Oral two times a day      Vital Signs Last 24 Hrs  T(C): 37 (26 Apr 2024 11:00), Max: 37.2 (26 Apr 2024 00:45)  T(F): 98.6 (26 Apr 2024 11:00), Max: 99 (26 Apr 2024 00:45)  HR: 113 (26 Apr 2024 11:00) (62 - 113)  BP: 91/53 (26 Apr 2024 11:00) (91/53 - 160/68)  BP(mean): 68 (26 Apr 2024 11:00) (68 - 110)  RR: 10 (26 Apr 2024 11:00) (10 - 18)  SpO2: 95% (26 Apr 2024 11:00) (93% - 100%)    Parameters below as of 26 Apr 2024 08:00  Patient On (Oxygen Delivery Method): room air      I&O's Detail    25 Apr 2024 07:01  -  26 Apr 2024 07:00  --------------------------------------------------------  IN:    Fat Emulsion (Fish Oil &amp; Plant Based) 20% Infusion: 249.6 mL    Oral Fluid: 218 mL    sodium chloride 0.9%: 1440 mL    TPN (Total Parenteral Nutrition): 2102 mL  Total IN: 4009.6 mL    OUT:    Drain (mL): 100 mL    Drain (mL): 1115 mL    Norepinephrine: 0 mL    Voided (mL): 1200 mL  Total OUT: 2415 mL    Total NET: 1594.6 mL      26 Apr 2024 07:01  -  26 Apr 2024 11:44  --------------------------------------------------------  IN:    PRBCs (Packed Red Blood Cells): 300 mL    sodium chloride 0.9%: 90 mL  Total IN: 390 mL    OUT:  Total OUT: 0 mL    Total NET: 390 mL          General: NAD, resting comfortably in bed  C/V: NSR  Pulm: Nonlabored breathing, no respiratory distress  Abd: soft, NT/ND, ostomy PPP with blood-tinged output, fistula with bilious output, no active bleeding noted  Extrem: WWP, no edema, SCDs in place        LABS:                        7.5    7.79  )-----------( 171      ( 26 Apr 2024 05:30 )             23.6     04-26    139  |  105  |  39<H>  ----------------------------<  173<H>  3.5   |  28  |  1.31<H>    Ca    7.6<L>      26 Apr 2024 05:30  Phos  1.4     04-26  Mg     2.1     04-26    TPro  6.4  /  Alb  1.8<L>  /  TBili  4.9<H>  /  DBili  x   /  AST  281<H>  /  ALT  148<H>  /  AlkPhos  98  04-26    PT/INR - ( 25 Apr 2024 11:02 )   PT: 15.1 sec;   INR: 1.34          PTT - ( 25 Apr 2024 11:02 )  PTT:36.3 sec  Urinalysis Basic - ( 26 Apr 2024 05:30 )    Color: x / Appearance: x / SG: x / pH: x  Gluc: 173 mg/dL / Ketone: x  / Bili: x / Urobili: x   Blood: x / Protein: x / Nitrite: x   Leuk Esterase: x / RBC: x / WBC x   Sq Epi: x / Non Sq Epi: x / Bacteria: x        RADIOLOGY & ADDITIONAL STUDIES:  
INTERVAL/OVERNIGHT EVENTS: AMRITA. Heart rate in 90's, sinus rhythm.     SUBJECTIVE: Pt seen at bedside this am. Endorses a good nights rest. Denies headache, nausea, chills, vomiting, chest pain, shortness of breath, abdominal pain, extremity pain or weakness.       Neurologic Medications  acetaminophen     Tablet .. 650 milliGRAM(s) Oral every 6 hours PRN Mild Pain (1 - 3)  melatonin 5 milliGRAM(s) Oral at bedtime  venlafaxine XR. 300 milliGRAM(s) Oral daily    Cardiovascular Medications  metoprolol tartrate 50 milliGRAM(s) Oral every 8 hours    Gastrointestinal Medications  dextrose 10% Bolus 125 milliLiter(s) IV Bolus once  dextrose 5%. 1000 milliLiter(s) IV Continuous <Continuous>  lipid, fat emulsion (Fish Oil and Plant Based) 20% Infusion 0.52 Gm/kG/Day IV Continuous <Continuous>  pancrelipase  (CREON 24,000 Lipase Units) 1 Capsule(s) Oral three times a day with meals  pantoprazole  Injectable 40 milliGRAM(s) IV Push two times a day  Parenteral Nutrition - Adult 1 Each TPN Continuous <Continuous>  potassium chloride   Powder 20 milliEquivalent(s) Oral once  potassium phosphate / sodium phosphate Powder (PHOS-NaK) 1 Packet(s) Oral once    Endocrine/Metabolic Medications  atorvastatin 20 milliGRAM(s) Oral at bedtime  dextrose 50% Injectable 25 Gram(s) IV Push once  dextrose Oral Gel 15 Gram(s) Oral once PRN Blood Glucose LESS THAN 70 milliGRAM(s)/deciliter  glucagon  Injectable 1 milliGRAM(s) IntraMuscular once  insulin lispro (ADMELOG) corrective regimen sliding scale   SubCutaneous Before meals and at bedtime  levothyroxine 50 MICROGram(s) Oral daily    Topical/Other Medications  chlorhexidine 2% Cloths 1 Application(s) Topical <User Schedule>      MEDICATIONS  (PRN):  acetaminophen     Tablet .. 650 milliGRAM(s) Oral every 6 hours PRN Mild Pain (1 - 3)  dextrose Oral Gel 15 Gram(s) Oral once PRN Blood Glucose LESS THAN 70 milliGRAM(s)/deciliter      I&O's Detail    27 Apr 2024 07:01  -  28 Apr 2024 07:00  --------------------------------------------------------  IN:    Fat Emulsion (Fish Oil &amp; Plant Based) 20% Infusion: 270.4 mL    Oral Fluid: 120 mL    PRBCs (Packed Red Blood Cells): 360 mL    TPN (Total Parenteral Nutrition): 2764 mL  Total IN: 3514.4 mL    OUT:    Drain (mL): 200 mL    Drain (mL): 2000 mL    Fat Emulsion (Fish Oil &amp; Plant Based) 20% Infusion: 0 mL    Voided (mL): 850 mL  Total OUT: 3050 mL    Total NET: 464.4 mL          Vital Signs Last 24 Hrs  T(C): 36.8 (28 Apr 2024 06:01), Max: 37.5 (27 Apr 2024 09:00)  T(F): 98.3 (28 Apr 2024 06:01), Max: 99.5 (27 Apr 2024 09:00)  HR: 94 (28 Apr 2024 07:00) (88 - 102)  BP: 103/53 (28 Apr 2024 07:00) (91/53 - 150/62)  BP(mean): 73 (28 Apr 2024 07:00) (66 - 99)  RR: 21 (28 Apr 2024 07:00) (19 - 31)  SpO2: 92% (28 Apr 2024 07:00) (92% - 98%)    Parameters below as of 28 Apr 2024 08:00  Patient On (Oxygen Delivery Method): BiPAP/CPAP    O2 Concentration (%): 21    GENERAL: NAD, resting comfortably in bed  HEENT: NCAT, MMM  C/V: Normal rate, regular rhythm without murmurs, rubs, or gallops.   PULM: Nonlabored breathing, no respiratory distress, clear to auscultation bilaterally, diminished at bases.   ABD: Soft, ND, NT, no rebound tenderness, no guarding. Midline fistula with green viscous output. Ileostomy with bilious output.   EXTREM: WWP, no edema, SCDs in place. RUE PICC dressing c/d/i without erythema or ecchymosis at insertion site.  NEURO: No focal deficits    LABS:                        8.3    8.69  )-----------( 170      ( 28 Apr 2024 05:30 )             26.4     04-28    135  |  101  |  42<H>  ----------------------------<  163<H>  3.7   |  27  |  1.33<H>    Ca    7.5<L>      28 Apr 2024 05:30  Phos  2.9     04-28  Mg     2.0     04-28    TPro  6.1  /  Alb  1.6<L>  /  TBili  5.2<H>  /  DBili  3.8<H>  /  AST  293<H>  /  ALT  156<H>  /  AlkPhos  87  04-28    PT/INR - ( 28 Apr 2024 05:30 )   PT: 14.5 sec;   INR: 1.28          PTT - ( 28 Apr 2024 05:30 )  PTT:34.5 sec  Urinalysis Basic - ( 28 Apr 2024 05:30 )    Color: x / Appearance: x / SG: x / pH: x  Gluc: 163 mg/dL / Ketone: x  / Bili: x / Urobili: x   Blood: x / Protein: x / Nitrite: x   Leuk Esterase: x / RBC: x / WBC x   Sq Epi: x / Non Sq Epi: x / Bacteria: x        RADIOLOGY & ADDITIONAL STUDIES:      Culture - Blood (collected 04-25-24 @ 11:02)  Source: .Blood Blood-Peripheral  Preliminary Report (04-27-24 @ 18:01):    No growth at 48 Hours    Culture - Blood (collected 04-25-24 @ 11:02)  Source: .Blood Blood-Peripheral  Preliminary Report (04-27-24 @ 18:01):    No growth at 48 Hours    
No complaints this am VS stable Afeb Exam stable

## 2024-04-28 NOTE — PROGRESS NOTE ADULT - ASSESSMENT
78M well known to surgical service with PMHx of AFib/Flutter 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 Prolonged hospital course significant for recurrent AKIs, cholestatic transaminitis s/p perc cony, prior DVTs, PNAs, fungemia, sinus pauses s/p pacemaker and recurrent bleeding from fistula most recently s/p IR embolization of L branch of inferior epigastric artery via R groin access (4/16). Patient now re-presents with bleeding from fistula site.  In the ED, noted to develop hypotension depsite 2 u PRBC with appropriate Hb response (6.4 --> 8.3) requiring initiation of vasopressor support. Transferred to the SICU for further monitoring.     Neuro: No pain or nausea at this time. A&Ox3 Hx of depression - continue home Venlafaxine  HEENT: No concerns  CV: Goal maintain MAP >65. Transient hypotension requiring levophed likely related to hypovolemia in setting of fistula output and bleeding. Now off levophed. AFib/Flutter s/p DCCVs, as well as sinus pauses s/p pacemaker (2/5) - not on AC at home - AFib w/ -120s resolved with increase in lopressor to 50 TID. Hx of HLD - continue rosuvastatin. No new bloody output from ileostomy. Receiving 1uPRBC this am.  Pulm: Goal maintain saturation >94%. Recent admission for COVID PNA. No active pulmonary concerns.  GI: Regular diet/TPN. Hx of Crohn's SBO s/p ex lap with numerous SBR and RTOR for dehiscence c/b entero-atmospheric fistula. Numerous admissions for bleeding fistula requiring transfusions - s/p 3 u PRBC this admission. Pending scope for increased bleeding from ileostomy this am. Hx of pancreatic insufficiency - continue Creon  : Voids. ELVI on presentation likely 2/2 hypovolemia, improved after resuscitation.  ID: Not currently indicated. Monitor leukocytosis and fever curve. F/U BCx  Endo: mISS. Hx of hypothyroidism - continue home synthroid.  Heme/PPx: SCDs. Holding lovenox in setting of bleeding from fistula.   Wounds/Lines/Drains: Ileostomy, Fistula, RUE PICC  PT/OT: Ordered  Dispo: SICU

## 2024-04-28 NOTE — DISCHARGE NOTE NURSING/CASE MANAGEMENT/SOCIAL WORK - PATIENT PORTAL LINK FT
You can access the FollowMyHealth Patient Portal offered by [facility] [place] by registering at the following website: [email or web address]. By joining [employer]’s FollowMyHealth portal, you will also be able to view your health information using other applications (apps) compatible with our system.

## 2024-04-30 NOTE — H&P ADULT - NSHPSOCIALHISTORY_GEN_ALL_CORE
Lives at home with wife and 24 hour nursing care.  Denies alcohol, tobacco drug, use.   Is a retired urologist.

## 2024-04-30 NOTE — H&P ADULT - ASSESSMENT
78M well known to surgical service with PMHx of AFib/Flutter 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 Prolonged hospital course significant for recurrent AKIs, cholestatic transaminitis s/p perc cony, prior DVTs, PNAs, fungemia, sinus pauses s/p pacemaker and recurrent bleeding from fistula most recently s/p IR embolization of L branch of inferior epigastric artery via R groin access (4/16). Patient now re-presents with bleeding from fistula site and ileostomy site.   Patient reports he was planned for outpatient colonoscopy tomorrow with Dr. Yuan. Will call GI and prep for scope tomorrow.    Admit to telemetry, Dr. Peterson  CLD/TPN  Pain/nausea control PRN  Home meds as appropriate  GI Consult - f/u recommendations  NPO@MN for scope  Wound care consult

## 2024-04-30 NOTE — ED PROVIDER NOTE - GASTROINTESTINAL, MLM
Abdomen soft, non-tender, no guarding.  Ostomy bag with dark stool/blood, no active bleeding identified

## 2024-04-30 NOTE — ED PROVIDER NOTE - CLINICAL SUMMARY MEDICAL DECISION MAKING FREE TEXT BOX
Patient with chronic intermittent bleeding from ostomy site now with persistent bleeding.  Patient appears lethargic and tired.  Soft normal blood pressure on arrival.  Improved with fluids.  Patient lethargic took some extra Ambien.  Patient jaundiced and looks chronically ill.  Labs obtained and patient slightly anemic also probably concentrated with elevated creatinine and LFTs.  Consulted surgery and GI.  Recommendations are for transfusion and inpatient admission with goal of hemoglobin above 8.  Do not suspect acute cholecystitis or abdominal infection or sepsis given lack of abdominal pain or tenderness.  Unlikely DIC.  No coagulopathy identified.   Bleeding controlled.  Pt stable for regional admit.

## 2024-04-30 NOTE — ED ADULT NURSE NOTE - NSFALLRISKINTERV_ED_ALL_ED

## 2024-04-30 NOTE — ED ADULT NURSE NOTE - OBJECTIVE STATEMENT
78y male presents to ED c/o ostomy complication. Per pt home health nurse, pt has ostomy site to [facility] which has been having issues with multiple [facility] admissions for bleeding. Home nurse noted blood in ostomy bag and spurts of blood from ostomy site when changing bag. Pt also appears jaundiced which per family is pt baseline. 78y male presents to ED c/o ostomy complication. Per pt home health nurse, pt has ostomy site to [facility] which has been having issues with multiple [facility] admissions for bleeding. Home nurse noted blood in ostomy bag and spurts of blood from ostomy site when changing bag. Pt also appears jaundiced which per family is pt baseline. Hx of chron's with multiple abdominal procedures. Pt noted to be sleepy during assessment. Per family, pt given extra dose of ambien by nurse and has been drowsy since then. A&Ox4.

## 2024-04-30 NOTE — H&P ADULT - NSHPPHYSICALEXAM_GEN_ALL_CORE
General: Resting comfortably in bed, NAD  Neuro: A&Ox3, normal speech  HEENT: Sceral icterus. Mucus membranes dry appearing. No JVD, lymphadenopathy appreciated  Pulmonary: Nonlabored breathing, no respiratory distress or accessory muscle noted. lungs CTA B/L  Cardiovascular: NSR  Abdomen: Soft, nondistended, nontender. Midline fistula noted to be pink with healthy granulation tissue and bilio-sanguinous output. R hemiabdomen ostomy appliance noted with minimal blood tinged output  Extremities: Toes cool to touch, but with appropriate capillary refill. No peripheral edema noted. 2+ radial and DP pulses b/l  LAUREN: No joint swelling or erythema appreciated  Skin: Jaundiced  Psychiatric: Goal-oriented thought, normal affect

## 2024-04-30 NOTE — H&P ADULT - HISTORY OF PRESENT ILLNESS
78M well known to surgical service with PMHx of AFib/Flutter 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 Prolonged hospital course significant for recurrent AKIs, cholestatic transaminitis s/p perc cony, prior DVTs, PNAs, fungemia, sinus pauses s/p pacemaker and recurrent bleeding from fistula most recently s/p IR embolization of L branch of inferior epigastric artery via R groin access (4/16), with re-presentation last week for recurrent bleeding at fistula site requiring transfusions, who now presents with recurrent bleeding at tissue surrounding stoma, as well as large volume bloody output from ileostomy site. Patient reports ileostomy normally produces dark clots daily, but over the past 2 days has produced more large volume, liquid dark sanguinous output. Admits to mild lightheadedness at home, but denies any additional anemic symptoms including chest pain or shortness of breath. States he has been tolerating PO. No other complaints.

## 2024-04-30 NOTE — H&P ADULT - NSHPLABSRESULTS_GEN_ALL_CORE
INTERPRETATION:  XR CHEST dated 4/30/2024 4:00 PM    CLINICAL INFORMATION: Male, 78 years old.  anemia.    PRIOR STUDIES: /27/2024    FINDINGS/  IMPRESSION: Interval worsening of pulmonary venous congestion with   persistent bibasilar pleural effusions. A right PICC is again noted the   tip located the superior cavoatrial junction. Again noted is a left-sided   loop recorder. Degenerative changes and widening of the right   acromioclavicular joint. Superior subluxation of the right humeral head   with multiple tendon anchors.    --- End of Report ---

## 2024-04-30 NOTE — ED ADULT NURSE NOTE - NS ED TRIAGE CLINICAL UPGRADE
Deteriorating patient status - Patient was clinically upgraded due to deteriorating patient status.
details…

## 2024-05-01 NOTE — PRE-ANESTHESIA EVALUATION ADULT - NSRADCARDRESULTSFT_GEN_ALL_CORE
TTE 4/2/2024  "CONCLUSIONS:     1. Technically difficult study.   2. Normal left ventricular size and systolic function.   3. Mild symmetric left ventricular hypertrophy.   4. Normal right ventricular size and systolic function.   5. Severely dilated right atrium.   6. Aortic sclerosis without significant stenosis.   7. Mild aortic regurgitation.   8. Pulmonary hypertension present, pulmonary artery systolic pressure is 42 mmHg.   9. Trivial pericardial effusion.  10. Left pleural effusion."

## 2024-05-01 NOTE — PROGRESS NOTE ADULT - SUBJECTIVE AND OBJECTIVE BOX
VSS  H&H responded apropriately  No blood from ileostomy    for endoscopic procedure today  ?coagulopathy  Heme reconsulted

## 2024-05-01 NOTE — PROGRESS NOTE ADULT - SUBJECTIVE AND OBJECTIVE BOX
ileoscopy showed an ulcer with clot proximal to stoma unable to intubate fully due to stenosis with water flush it was visible that the site kept oozing so a hemoclip was applied  may eat tonight  follow clinical course  agree heme eval  due to short bowel syndrome will likely have vitamin K deficiency as well as other vitamins

## 2024-05-01 NOTE — PROGRESS NOTE ADULT - SUBJECTIVE AND OBJECTIVE BOX
INTERVAL HPI/OVERNIGHT EVENTS:  AMRITA, VSS     SUBJECTIVE: Pt seen and examined at bedside this am by surgery team. No acute complaints. Pain well controlled. Denies f/n/v/cp/sob.    MEDICATIONS  (STANDING):  chlorhexidine 2% Cloths 1 Application(s) Topical <User Schedule>  dextrose 10% Bolus 125 milliLiter(s) IV Bolus once  dextrose 5%. 1000 milliLiter(s) (100 mL/Hr) IV Continuous <Continuous>  dextrose 5%. 1000 milliLiter(s) (50 mL/Hr) IV Continuous <Continuous>  dextrose 50% Injectable 25 Gram(s) IV Push once  dextrose 50% Injectable 12.5 Gram(s) IV Push once  glucagon  Injectable 1 milliGRAM(s) IntraMuscular once  insulin lispro (ADMELOG) corrective regimen sliding scale   SubCutaneous every 6 hours  levothyroxine 50 MICROGram(s) Oral daily  metoprolol tartrate 50 milliGRAM(s) Oral two times a day  pancrelipase  (CREON 24,000 Lipase Units) 1 Capsule(s) Oral three times a day with meals  venlafaxine XR. 150 milliGRAM(s) Oral daily    MEDICATIONS  (PRN):  dextrose Oral Gel 15 Gram(s) Oral once PRN Blood Glucose LESS THAN 70 milliGRAM(s)/deciliter  ondansetron Injectable 4 milliGRAM(s) IV Push every 6 hours PRN Nausea  zolpidem 5 milliGRAM(s) Oral at bedtime PRN Insomnia  zolpidem 5 milliGRAM(s) Oral at bedtime PRN Insomnia    Vital Signs Last 24 Hrs  T(C): 35.8 (30 Apr 2024 19:50), Max: 36.6 (30 Apr 2024 14:56)  T(F): 96.5 (30 Apr 2024 19:50), Max: 97.9 (30 Apr 2024 14:56)  HR: 92 (01 May 2024 04:21) (92 - 95)  BP: 125/59 (01 May 2024 04:21) (95/65 - 137/61)  BP(mean): 85 (01 May 2024 04:21) (73 - 85)  RR: 16 (01 May 2024 04:21) (16 - 20)  SpO2: 98% (01 May 2024 04:21) (93% - 98%)    Parameters below as of 01 May 2024 04:21  Patient On (Oxygen Delivery Method): nasal cannula  O2 Flow (L/min): 2    PHYSICAL EXAM:    Constitutional: A&Ox3, NAD. Jaundiced     Respiratory: non labored breathing, no respiratory distress    Cardiovascular: NSR, RRR    Gastrointestinal: abdomen soft, nd, nt. Midline fistula noted to be pink with healthy granulation tissue, dark SS OP noted in bag. Ileostomy w/ stoma pink and viable, no gas or stool. No R/G.    Extremities: wwp, no calf tenderness or edema. SCDs in place       I&O's Detail    30 Apr 2024 07:01  -  01 May 2024 07:00  --------------------------------------------------------  IN:  Total IN: 0 mL    OUT:    External Ventricular Device (mL): 650 mL    Ileostomy (mL): 0 mL    Voided (mL): 400 mL  Total OUT: 1050 mL    Total NET: -1050 mL          LABS:                        7.5    10.43 )-----------( 177      ( 30 Apr 2024 15:42 )             22.9     04-30    139  |  101  |  44<H>  ----------------------------<  102<H>  4.0   |  32<H>  |  1.43<H>    Ca    7.5<L>      30 Apr 2024 15:42    TPro  5.7<L>  /  Alb  1.8<L>  /  TBili  5.7<H>  /  DBili  4.1<H>  /  AST  312<H>  /  ALT  167<H>  /  AlkPhos  92  04-30    PT/INR - ( 30 Apr 2024 15:42 )   PT: 16.3 sec;   INR: 1.45          PTT - ( 30 Apr 2024 15:42 )  PTT:34.4 sec  Urinalysis Basic - ( 30 Apr 2024 15:42 )    Color: x / Appearance: x / SG: x / pH: x  Gluc: 102 mg/dL / Ketone: x  / Bili: x / Urobili: x   Blood: x / Protein: x / Nitrite: x   Leuk Esterase: x / RBC: x / WBC x   Sq Epi: x / Non Sq Epi: x / Bacteria: x        RADIOLOGY & ADDITIONAL STUDIES:

## 2024-05-01 NOTE — PACU DISCHARGE NOTE - AIRWAY PATENCY:
Neurology Progress Note  Neurohospitalist Service, [facility]    Referring Physician: Patricia Youngblood D.O.    Chief Complaint   Patient presents with    ALOC     Patient found down by neighbors, last seen per EMS was 12-24 hours ago, patient confused, but states she did not fall, states she was going to BR and lie down on ground, reported diarrhea.         HPI: Refer to initial documented Neurology H&P, as detailed in the patient's chart.    Interval History:   Admitted after being found down - she initially improved but then has had a few spells of deep lethargy/obtundation, only responding to pain. No associated convulsive activity noted. EEG with slowing and possible semirhythmic sharply contoured waveforms.     Review of systems: In addition to what is detailed in the HPI and/or updated in the interval history, all other systems reviewed and are negative.    Past Medical History:    has a past medical history of Adenocarcinoma of colon ([facility]) (10/30/2018), Anesthesia, Back pain (06/01/2022), Blood clotting disorder ([facility]) (2012), Bowel habit changes, Breath shortness (06/26/2023), Cancer ([facility]), Cataract, Chronic back pain greater than 3 months duration, Deep vein blood clot of left lower extremity ([facility]) (2022), Deep vein thrombosis ([facility]) (03/08/2016), Essential hypertension, benign (06/27/2012), GERD (gastroesophageal reflux disease) (06/27/2012), Heart murmur, High cholesterol, Hyperlipidemia, Hypertension, Impaired fasting glucose (11/02/2017), Melanoma ([facility]), Mild aortic stenosis, Other and unspecified hyperlipidemia (06/27/2012), Prediabetes, Protein S deficiency ([facility]) (11/02/2017), Rheumatoid arthritis involving multiple sites with positive rheumatoid factor ([facility]) (03/14/2016), Rheumatoid nodule ([facility]) (07/25/2017), Right bundle branch block (06/27/2012), and Urinary incontinence (11/02/2017).    She has no past medical history of Acute nasopharyngitis, Anginal syndrome ([facility]), Asthma,  Carcinoma in situ of respiratory system, Congestive heart failure (HCC), Continuous ambulatory peritoneal dialysis status (HCC), Coughing blood, Dental disorder, Diabetes (HCC), Dialysis patient (HCC), Disorder of thyroid, Emphysema of lung (HCC), Glaucoma, Gynecological disorder, Heart burn, Hepatitis A, Hepatitis B, Hepatitis C, Hiatus hernia syndrome, Indigestion, Infectious disease, Jaundice, Myocardial infarct (HCC), Pacemaker, Pneumonia, Pregnant, Psychiatric problem, Renal disorder, Rheumatic fever, Seizure (HCC), Sleep apnea, Snoring, Stroke (HCC), Tuberculosis, or Urinary bladder disorder.    FHx:  family history includes Cancer in her father and mother; Heart Disease in her brother and sister; Leukemia in her father; Other in her son.    SHx:   reports that she has never smoked. She has never used smokeless tobacco. She reports that she does not currently use alcohol. She reports that she does not use drugs.    Medications:    Current Facility-Administered Medications:     nitrofurantoin (Macrobid) capsule 100 mg, 100 mg, Oral, BID WITH MEALS, Neo Cobos P.A.-C., 100 mg at 03/27/24 0926    calcium carbonate (Tums) chewable tab 500 mg, 500 mg, Oral, BID, Patricia Youngblood D.O., 500 mg at 03/27/24 0448    ampicillin (Omnipen) 2,000 mg in  mL IVPB, 2,000 mg, Intravenous, Q6HRS, RUY ZavalaO., Stopped at 03/27/24 1419    haloperidol lactate (Haldol) injection 1 mg, 1 mg, Intravenous, Once PRN, RUY ZavalaO.    amLODIPine (Norvasc) tablet 2.5 mg, 2.5 mg, Oral, Q EVENING, Selwyn Waters M.D., 2.5 mg at 03/25/24 2016    atorvastatin (Lipitor) tablet 20 mg, 20 mg, Oral, Q EVENING, Selwyn Waters M.D., 20 mg at 03/25/24 2016    gabapentin (Neurontin) capsule 300 mg, 300 mg, Oral, BID, eSlwyn Waters M.D., 300 mg at 03/27/24 0448    losartan (Cozaar) tablet 50 mg, 50 mg, Oral, DAILY, Selwyn Waters M.D., 50 mg at 03/27/24 0448    rivaroxaban (Xarelto) tablet 20 mg, 20 mg,  Satisfactory Oral, PM MEAL, Selwyn Waters M.D.    acetaminophen (Tylenol) tablet 650 mg, 650 mg, Oral, Q6HRS PRN, Selwyn Waters M.D.    Physical Examination:     Vitals:    03/27/24 0433 03/27/24 0705 03/27/24 1230 03/27/24 1524   BP: (!) 152/61 137/77  (!) 145/81   Pulse: 76 86  82   Resp: (!) 26 (!) 22  19   Temp: 37.6 °C (99.7 °F) 37.7 °C (99.8 °F)  37.3 °C (99.2 °F)   TempSrc: Temporal Temporal  Temporal   SpO2: 94% 91%  94%   Weight:   73.7 kg (162 lb 7.7 oz)    Height:           General: Patient is lying in NAD    NEUROLOGICAL EXAM:     MS: eyes closed; opens eyes and tracks to voice briefly; grimaces to pain.  CN: PEERL, slight side to side movements - no gaze preference, unable to eval VF, no obvious facial asymmetry; unable to test uvular lift and tongue protrusion, SCM and trap; Hearing intact grossly.  Fundus not visualized  MOT: Nl bulk and tone.  antigravity strength of B UE; moves B LE to noxious stimulation  SENS: responds to noxious stimulation as above  DTR: 2+ and symmetric; toes down  COOR: unable to test  Gait: unable to test      Objective Data:    Labs:  Lab Results   Component Value Date/Time    PROTHROMBTM 24.8 (H) 10/09/2023 11:25 AM    INR 1.30 (A) 12/15/2023 02:58 PM      Lab Results   Component Value Date/Time    WBC 16.3 (H) 03/27/2024 04:09 AM    RBC 4.07 (L) 03/27/2024 04:09 AM    HEMOGLOBIN 12.0 03/27/2024 04:09 AM    HEMATOCRIT 35.2 (L) 03/27/2024 04:09 AM    MCV 86.5 03/27/2024 04:09 AM    MCH 29.5 03/27/2024 04:09 AM    MCHC 34.1 03/27/2024 04:09 AM    MPV 9.8 03/27/2024 04:09 AM    NEUTSPOLYS 75.90 (H) 03/26/2024 04:15 AM    LYMPHOCYTES 11.40 (L) 03/26/2024 04:15 AM    MONOCYTES 10.00 03/26/2024 04:15 AM    EOSINOPHILS 1.90 03/26/2024 04:15 AM    BASOPHILS 0.50 03/26/2024 04:15 AM      Lab Results   Component Value Date/Time    SODIUM 136 03/27/2024 04:09 AM    POTASSIUM 3.5 (L) 03/27/2024 04:09 AM    CHLORIDE 106 03/27/2024 04:09 AM    CO2 17 (L) 03/27/2024 04:09 AM    GLUCOSE 147  (H) 03/27/2024 04:09 AM    BUN 16 03/27/2024 04:09 AM    CREATININE 0.60 03/27/2024 04:09 AM      Lab Results   Component Value Date/Time    CHOLSTRLTOT 158 10/23/2023 07:09 AM    LDL 84 10/23/2023 07:09 AM    HDL 57 10/23/2023 07:09 AM    TRIGLYCERIDE 83 10/23/2023 07:09 AM       Lab Results   Component Value Date/Time    ALKPHOSPHAT 54 03/26/2024 04:15 AM    ASTSGOT 30 03/26/2024 04:15 AM    ALTSGPT 29 03/26/2024 04:15 AM    TBILIRUBIN 0.6 03/26/2024 04:15 AM        Imaging/Testing:  CT head   1.  Hypoattenuating foci in the bilateral cerebellum which are indeterminant. They could be subacute infarcts versus other. Recommend brain MRI workup.  2.  Redemonstration of a small focus of low-attenuation in the left parietal deep and subcortical white matter. Possibly gliosis versus subacute infarct.  3.  Findings are stable since the noncontrast head CT performed one day prior.    CTA head  1.  Short segment decreased enhancement in the RIGHT superior sylvian division of middle superior artery, M3 segment, focal stenosis versus nonocclusive clot.  2.  No intracranial aneurysm or abrupt large vessel cut off.    CTA neck  No focal high-grade stenosis, dissection or occlusion of the cervical carotid or vertebral arteries.     Assessment and Plan:    Acute, fluctuating encephalopathy and lethargy - CTA head/neck obtained during previous spell. Possible stenosis vs nonocclusive clot in the R M3. Evaluated yesterday by Dr. Gay. Low suspicion for stroke so not a candidate for tpa at the time. Currently, she moves all extremities to pain, but just appears lethargic. MRI brain pending. Receiving abx for UTI. Infectious encephalopathy favored, though underlying nonconvulsive seizure cannot be ruled out.     Plan:  MRI brain pending  cEEG  Consider LP depending upon her course and the results of the above  Neurology team will follow    Cesar Casper MD  Board Certified Neurology, ABPN

## 2024-05-01 NOTE — PRE-ANESTHESIA EVALUATION ADULT - NSDENTALSD_ENT_ALL_CORE
English
Missing several rear molars, poor dentition with several chipped teeth, ground/worn teeth, cavities./missing teeth

## 2024-05-01 NOTE — PRE-ANESTHESIA EVALUATION ADULT - NSANTHPMHFT_GEN_ALL_CORE
Cardiac: Positive for HTN, HLD, Bradycardia, Atrial Fibrillation/Atrial Flutter with Numerous DCCVs, pacemaker status with insertion on 2/5/2024, pulmonary hypertension. Denies MI/Angina/Heart Failure.   Pulmonary: Positive for prior pneumonia. Denies Asthma, COPD, JEET.  Renal: Positive for CKD, Creatinine 1.47 5/1/2024  Hepatic: Positive for transaminase elevation 319 AST, 162 ALT, T Bili 6.7, AP 95.  Gastrointestinal: Positive for Crohn's Disease, Small Bowel Obstruction s/p open lysis of adhesions, small bowel resection c/b abdominal wall dehiscence and enteroatmospheric fistula c/b recurrent ELVI, cholestatic transaminitis s/p percutaneous cholecystostomy, DVTs, pneumonia, fungemia, sinus pauses now s/p pacemaker, recurrent bleeding from enteroatmospheric fistula s/p IR embolization of left inferior epigastric artery 4/16/2024 now representing for bleeding from fistula site and ileostomy site.  Endocrine: Denies DM or thyroid dysfunction.  Neurologic: Denies/seizure disorder  Hematologic: Positive for anemia, Hgb 8.8. Positive for prior DVTs, Abnormal and elevated INR/PTT. Denies currently blood thinning medication.     PSH: Small bowel resection c/b abdominal wall dehiscence and enteroatmospheric fistula, IR embolization of left inferior epigastric artery, Pacemaker Insertion, Percutaneous cholecystostomy, Appendectomy, Cataract lens replacement, Knee Surgery.

## 2024-05-02 NOTE — DISCHARGE NOTE PROVIDER - NSDCFUADDINST_GEN_ALL_CORE_FT
General Discharge Instructions:  Please resume all regular home medications unless specifically advised not to take a particular medication. Also, please take any new medications as prescribed.  Please get plenty of rest, continue to ambulate several times per day, and drink adequate amounts of fluids. Avoid lifting weights greater than 5-10 lbs until you follow-up with your surgeon, who will instruct you further regarding activity restrictions.  Avoid driving or operating heavy machinery while taking pain medications.  Please follow-up with your surgeon and Primary Care Provider (PCP) as advised.  Incision Care:  *Please call your doctor or nurse practitioner if you have increased pain, swelling, redness, or drainage from the incision site.  *Avoid swimming and baths until your follow-up appointment.  *You may shower, and wash surgical incisions with a mild soap and warm water. Gently pat the area dry.     General Discharge Instructions:  Please resume all regular home medications unless specifically advised not to take a particular medication. Also, please take any new medications as prescribed.  Please get plenty of rest, continue to ambulate several times per day, and drink adequate amounts of fluids. Avoid lifting weights greater than 5-10 lbs until you follow-up with your surgeon, who will instruct you further regarding activity restrictions.  Avoid driving or operating heavy machinery while taking pain medications.  Please follow-up with your surgeon and Primary Care Provider (PCP) as advised.

## 2024-05-02 NOTE — PROGRESS NOTE ADULT - SUBJECTIVE AND OBJECTIVE BOX
INTERVAL HPI/OVERNIGHT EVENTS: AMRITA, VSS, pt requesting CPAP, confirmed CT for 6am , omnipaque ordered for 4AM, LUE edema, pt c/o sob satting 100% on bipap, 20 lasix given     SUBJECTIVE: Patient seen and examined bedside with chief resident on AM rounds. He has no acute complaints. He just recently returned to his room from CT scanner. Patient had some increased work of breathing overnight, with some left upper extremity edema. Symptoms improved after lasix 20. Currently on BIPAP and feeling better. Denies cp, sob, nausea, emesis, or fevers.     MEDICATIONS  (STANDING):  chlorhexidine 2% Cloths 1 Application(s) Topical <User Schedule>  dextrose 10% Bolus 125 milliLiter(s) IV Bolus once  dextrose 5%. 1000 milliLiter(s) (50 mL/Hr) IV Continuous <Continuous>  dextrose 5%. 1000 milliLiter(s) (100 mL/Hr) IV Continuous <Continuous>  dextrose 50% Injectable 25 Gram(s) IV Push once  dextrose 50% Injectable 12.5 Gram(s) IV Push once  glucagon  Injectable 1 milliGRAM(s) IntraMuscular once  insulin lispro (ADMELOG) corrective regimen sliding scale   SubCutaneous every 6 hours  levothyroxine 50 MICROGram(s) Oral daily  lipid, fat emulsion (Fish Oil and Plant Based) 20% Infusion 0.625 Gm/kG/Day (20.8 mL/Hr) IV Continuous <Continuous>  metoprolol tartrate 50 milliGRAM(s) Oral two times a day  pancrelipase  (CREON 24,000 Lipase Units) 1 Capsule(s) Oral three times a day with meals  Parenteral Nutrition - Adult 1 Each TPN Continuous <Continuous>  venlafaxine XR. 150 milliGRAM(s) Oral daily    MEDICATIONS  (PRN):  dextrose Oral Gel 15 Gram(s) Oral once PRN Blood Glucose LESS THAN 70 milliGRAM(s)/deciliter  ondansetron Injectable 4 milliGRAM(s) IV Push every 6 hours PRN Nausea  zolpidem 5 milliGRAM(s) Oral at bedtime PRN Insomnia  zolpidem 5 milliGRAM(s) Oral at bedtime PRN Insomnia      Vital Signs Last 24 Hrs  T(C): 36.5 (02 May 2024 05:08), Max: 36.5 (02 May 2024 05:08)  T(F): 97.7 (02 May 2024 05:08), Max: 97.7 (02 May 2024 05:08)  HR: 92 (02 May 2024 03:29) (89 - 92)  BP: 140/98 (02 May 2024 03:29) (94/55 - 140/98)  BP(mean): 111 (02 May 2024 03:29) (70 - 111)  RR: 18 (02 May 2024 03:29) (17 - 18)  SpO2: 92% (02 May 2024 03:29) (92% - 100%)    Parameters below as of 02 May 2024 03:29  Patient On (Oxygen Delivery Method): BiPAP/CPAP      PHYSICAL EXAM:  Constitutional: A&Ox3, BIPAP in place  Respiratory: non labored breathing, no respiratory distress  Cardiovascular: NSR, RRR  Gastrointestinal: Abdomen soft, NTND. Midline fistula pink with healthy granulation tissue and dark serosanguinous output is in the bag. Stoma is pink and patent, ostomy bag with minimal output.  Genitourinary: voiding  Extremities: wwp, no calf tenderness or edema, +SCDs. LUE edema improved from overnight, only mildly edematous        I&O's Detail    01 May 2024 07:01  -  02 May 2024 07:00  --------------------------------------------------------  IN:    Fat Emulsion (Fish Oil &amp; Plant Based) 20% Infusion: 104 mL    TPN (Total Parenteral Nutrition): 270 mL  Total IN: 374 mL    OUT:    External Ventricular Device (mL): 1100 mL    Ileostomy (mL): 0 mL    Voided (mL): 950 mL  Total OUT: 2050 mL    Total NET: -1676 mL          LABS:                        8.8    11.69 )-----------( 190      ( 01 May 2024 09:47 )             27.7     05-01    139  |  101  |  43<H>  ----------------------------<  83  4.4   |  31  |  1.47<H>    Ca    8.2<L>      01 May 2024 09:47  Phos  4.1     05-01  Mg     1.9     05-01    TPro  6.2  /  Alb  1.9<L>  /  TBili  6.7<H>  /  DBili  5.0<H>  /  AST  319<H>  /  ALT  162<H>  /  AlkPhos  95  05-01    PT/INR - ( 01 May 2024 09:49 )   PT: 16.1 sec;   INR: 1.43          PTT - ( 01 May 2024 09:49 )  PTT:40.6 sec  Urinalysis Basic - ( 01 May 2024 09:47 )    Color: x / Appearance: x / SG: x / pH: x  Gluc: 83 mg/dL / Ketone: x  / Bili: x / Urobili: x   Blood: x / Protein: x / Nitrite: x   Leuk Esterase: x / RBC: x / WBC x   Sq Epi: x / Non Sq Epi: x / Bacteria: x        RADIOLOGY & ADDITIONAL STUDIES: INTERVAL HPI/OVERNIGHT EVENTS: AMRITA, VSS, pt requesting CPAP, confirmed CT for 6am , omnipaque ordered for 4AM, LUE edema, pt c/o sob satting 100% on bipap, 20 lasix given     SUBJECTIVE: Patient seen and examined bedside with chief resident on AM rounds. He has no acute complaints. He just recently returned to his room from CT scanner. Patient had some increased work of breathing overnight, with some left upper extremity edema. Symptoms improved after lasix 20. Currently on BIPAP and feeling better. Denies cp, sob, nausea, emesis, or fevers.     MEDICATIONS  (STANDING):  chlorhexidine 2% Cloths 1 Application(s) Topical <User Schedule>  dextrose 10% Bolus 125 milliLiter(s) IV Bolus once  dextrose 5%. 1000 milliLiter(s) (50 mL/Hr) IV Continuous <Continuous>  dextrose 5%. 1000 milliLiter(s) (100 mL/Hr) IV Continuous <Continuous>  dextrose 50% Injectable 25 Gram(s) IV Push once  dextrose 50% Injectable 12.5 Gram(s) IV Push once  glucagon  Injectable 1 milliGRAM(s) IntraMuscular once  insulin lispro (ADMELOG) corrective regimen sliding scale   SubCutaneous every 6 hours  levothyroxine 50 MICROGram(s) Oral daily  lipid, fat emulsion (Fish Oil and Plant Based) 20% Infusion 0.625 Gm/kG/Day (20.8 mL/Hr) IV Continuous <Continuous>  metoprolol tartrate 50 milliGRAM(s) Oral two times a day  pancrelipase  (CREON 24,000 Lipase Units) 1 Capsule(s) Oral three times a day with meals  Parenteral Nutrition - Adult 1 Each TPN Continuous <Continuous>  venlafaxine XR. 150 milliGRAM(s) Oral daily    MEDICATIONS  (PRN):  dextrose Oral Gel 15 Gram(s) Oral once PRN Blood Glucose LESS THAN 70 milliGRAM(s)/deciliter  ondansetron Injectable 4 milliGRAM(s) IV Push every 6 hours PRN Nausea  zolpidem 5 milliGRAM(s) Oral at bedtime PRN Insomnia  zolpidem 5 milliGRAM(s) Oral at bedtime PRN Insomnia      Vital Signs Last 24 Hrs  T(C): 36.5 (02 May 2024 05:08), Max: 36.5 (02 May 2024 05:08)  T(F): 97.7 (02 May 2024 05:08), Max: 97.7 (02 May 2024 05:08)  HR: 92 (02 May 2024 03:29) (89 - 92)  BP: 140/98 (02 May 2024 03:29) (94/55 - 140/98)  BP(mean): 111 (02 May 2024 03:29) (70 - 111)  RR: 18 (02 May 2024 03:29) (17 - 18)  SpO2: 92% (02 May 2024 03:29) (92% - 100%)    Parameters below as of 02 May 2024 03:29  Patient On (Oxygen Delivery Method): BiPAP/CPAP      PHYSICAL EXAM:  Constitutional: A&Ox3, BIPAP in place, +jaundiced  Respiratory: non labored breathing, no respiratory distress  Cardiovascular: NSR, RRR  Gastrointestinal: Abdomen soft, NTND. Midline fistula pink with healthy granulation tissue and dark serosanguinous output is in the bag. Stoma is pink and patent, ostomy bag with minimal output.  Genitourinary: voiding  Extremities: wwp, no calf tenderness or edema, +SCDs. LUE edema improved from overnight, only mildly edematous        I&O's Detail    01 May 2024 07:01  -  02 May 2024 07:00  --------------------------------------------------------  IN:    Fat Emulsion (Fish Oil &amp; Plant Based) 20% Infusion: 104 mL    TPN (Total Parenteral Nutrition): 270 mL  Total IN: 374 mL    OUT:    External Ventricular Device (mL): 1100 mL    Ileostomy (mL): 0 mL    Voided (mL): 950 mL  Total OUT: 2050 mL    Total NET: -1676 mL          LABS:                        8.8    11.69 )-----------( 190      ( 01 May 2024 09:47 )             27.7     05-01    139  |  101  |  43<H>  ----------------------------<  83  4.4   |  31  |  1.47<H>    Ca    8.2<L>      01 May 2024 09:47  Phos  4.1     05-01  Mg     1.9     05-01    TPro  6.2  /  Alb  1.9<L>  /  TBili  6.7<H>  /  DBili  5.0<H>  /  AST  319<H>  /  ALT  162<H>  /  AlkPhos  95  05-01    PT/INR - ( 01 May 2024 09:49 )   PT: 16.1 sec;   INR: 1.43          PTT - ( 01 May 2024 09:49 )  PTT:40.6 sec  Urinalysis Basic - ( 01 May 2024 09:47 )    Color: x / Appearance: x / SG: x / pH: x  Gluc: 83 mg/dL / Ketone: x  / Bili: x / Urobili: x   Blood: x / Protein: x / Nitrite: x   Leuk Esterase: x / RBC: x / WBC x   Sq Epi: x / Non Sq Epi: x / Bacteria: x        RADIOLOGY & ADDITIONAL STUDIES:

## 2024-05-02 NOTE — DISCHARGE NOTE PROVIDER - PROVIDER TOKENS
PROVIDER:[TOKEN:[[id number]]] PROVIDER:[TOKEN:[[id number]]],PROVIDER:[TOKEN:[[id number]],ESTABLISHEDPATIENT:[T]],PROVIDER:[TOKEN:[[id number]],ESTABLISHEDPATIENT:[T]]

## 2024-05-02 NOTE — DISCHARGE NOTE PROVIDER - HOSPITAL COURSE
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 Prolonged hospital course significant for recurrent AKIs, cholestatic transaminitis s/p perc cony, prior DVTs, PNAs, fungemia, sinus pauses s/p pacemaker and recurrent bleeding from fistula most recently s/p IR embolization of L branch of inferior epigastric artery via R groin access (4/16), with re-presentation last week for recurrent bleeding at fistula site requiring transfusions, who presented this admission with recurrent bleeding at tissue surrounding stoma, as well as large volume bloody output from ileostomy site. Patient reported ileostomy normally produces dark clots daily, but 2 days prior to presenting to hospital had produced more large volume, liquid dark sanguinous output. Patient admitted to mild lightheadedness at home, but denied any additional anemic symptoms including chest pain or shortness of breath. Stated he had been tolerating PO. On 5/1, patient had dark bloody output in fistula bag with no bleeding from ileostomy site, was found to have a circumferential ulcer with a clot in small bowel that was clipped. Patient had left upper extremity edema that improved on 5/2. CT AP was performed and final read showed:___________. 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 Prolonged hospital course significant for recurrent AKIs, cholestatic transaminitis s/p perc cony, prior DVTs, PNAs, fungemia, sinus pauses s/p pacemaker and recurrent bleeding from fistula most recently s/p IR embolization of L branch of inferior epigastric artery via R groin access (4/16), with re-presentation last week for recurrent bleeding at fistula site requiring transfusions, who presented this admission with recurrent bleeding at tissue surrounding stoma, as well as large volume bloody output from ileostomy site. Patient reported ileostomy normally produces dark clots daily, but 2 days prior to presenting to hospital had produced more large volume, liquid dark sanguinous output. Patient admitted to mild lightheadedness at home, but denied any additional anemic symptoms including chest pain or shortness of breath. Stated he had been tolerating PO. On 5/1, patient had dark bloody output in fistula bag with no bleeding from ileostomy site, was found to have a circumferential ulcer with a clot in small bowel that was clipped. Patient had left upper extremity edema that improved on 5/2. CT AP was performed and final read showed:___________. On day of discharge patient was hemodynamically stable, with plan to follow up outpatient. 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 Prolonged hospital course significant for recurrent AKIs, cholestatic transaminitis s/p perc cony, prior DVTs, PNAs, fungemia, sinus pauses s/p pacemaker and recurrent bleeding from fistula most recently s/p IR embolization of L branch of inferior epigastric artery via R groin access (4/16), with re-presentation last week for recurrent bleeding at fistula site requiring transfusions, who presented this admission with recurrent bleeding at tissue surrounding stoma, as well as large volume bloody output from ileostomy site. Patient reported ileostomy normally produces dark clots daily, but 2 days prior to presenting to hospital had produced more large volume, liquid dark sanguinous output. Patient admitted to mild lightheadedness at home, but denied any additional anemic symptoms including chest pain or shortness of breath. Stated he had been tolerating PO. On 5/1, patient had dark bloody output in fistula bag with no bleeding from ileostomy site, was found to have a circumferential ulcer with a clot in small bowel that was clipped. Patient had left upper extremity edema that improved on 5/2. CT AP was performed and final read showed: no significant dilation to suggest stricture or obstruction, diffusely thickened folds of duodenal and jejunal loops in right abdomen suggesting nonspecific enteritis. Patient continued to have bleeding from fistula which was resolved after ligation with vicryl stitch. GI was consulted for possible EGD to evaluate further bleeding however GI did not recommend a scope at this time. Per heme onc, patient received 3u FFP on 5/3 with fibrinogen levels pending, plans to follow up levels outpatient. On day of discharge patient was hemodynamically stable, with plan to follow up outpatient.

## 2024-05-02 NOTE — ADVANCED PRACTICE NURSE CONSULT - ASSESSMENT
New fistula manager applied over fistula at mid abdomen. Site at 11 o'clock on scar tissue began bleeding, surgicell and pressure applied until bleeding stopped. Fistula manager cut larger to accommodate this area. Perifistular area protected with Cavilon barrier wipes and stoma rings prior to placing pouch. Once appliance was in place, additional site began bleeding so hole cut into appliance in order to place surgicell then hole repaired with tegaderm. Dr. Samuel aware of the above.

## 2024-05-02 NOTE — PROGRESS NOTE ADULT - SUBJECTIVE AND OBJECTIVE BOX
Pt seen and examined  events noted  sob requiring lasix  he requires lasix with transfusion  ileostomy looked okay     REVIEW OF SYSTEMS:  Constitutional: No fever, weight loss or fatigue  Cardiovascular: No chest pain, palpitations, dizziness or leg swelling  Gastrointestinal: No abdominal or epigastric pain. No nausea, vomiting or hematemesis; No diarrhea or constipation. No melena or hematochezia.  Skin: No itching, burning, rashes or lesions       MEDICATIONS:  MEDICATIONS  (STANDING):  chlorhexidine 2% Cloths 1 Application(s) Topical <User Schedule>  dextrose 10% Bolus 125 milliLiter(s) IV Bolus once  dextrose 5%. 1000 milliLiter(s) (100 mL/Hr) IV Continuous <Continuous>  dextrose 5%. 1000 milliLiter(s) (50 mL/Hr) IV Continuous <Continuous>  dextrose 50% Injectable 25 Gram(s) IV Push once  dextrose 50% Injectable 12.5 Gram(s) IV Push once  glucagon  Injectable 1 milliGRAM(s) IntraMuscular once  insulin lispro (ADMELOG) corrective regimen sliding scale   SubCutaneous every 6 hours  levothyroxine 50 MICROGram(s) Oral daily  lipid, fat emulsion (Fish Oil and Plant Based) 20% Infusion 0.625 Gm/kG/Day (20.8 mL/Hr) IV Continuous <Continuous>  metoprolol tartrate 50 milliGRAM(s) Oral two times a day  pancrelipase  (CREON 24,000 Lipase Units) 1 Capsule(s) Oral three times a day with meals  Parenteral Nutrition - Adult 1 Each TPN Continuous <Continuous>  Parenteral Nutrition - Adult 1 Each TPN Continuous <Continuous>  venlafaxine XR. 150 milliGRAM(s) Oral daily    MEDICATIONS  (PRN):  dextrose Oral Gel 15 Gram(s) Oral once PRN Blood Glucose LESS THAN 70 milliGRAM(s)/deciliter  ondansetron Injectable 4 milliGRAM(s) IV Push every 6 hours PRN Nausea  zolpidem 5 milliGRAM(s) Oral at bedtime PRN Insomnia  zolpidem 5 milliGRAM(s) Oral at bedtime PRN Insomnia      Allergies    penicillin (Angioedema)    Intolerances        Vital Signs Last 24 Hrs  T(C): 36.5 (02 May 2024 09:16), Max: 36.5 (02 May 2024 05:08)  T(F): 97.7 (02 May 2024 09:16), Max: 97.7 (02 May 2024 05:08)  HR: 92 (02 May 2024 08:55) (89 - 92)  BP: 120/57 (02 May 2024 08:55) (94/55 - 140/98)  BP(mean): 82 (02 May 2024 08:55) (70 - 111)  RR: 16 (02 May 2024 08:55) (16 - 18)  SpO2: 98% (02 May 2024 08:55) (92% - 100%)    Parameters below as of 02 May 2024 08:55  Patient On (Oxygen Delivery Method): BiPAP/CPAP        05-01 @ 07:01  -  05-02 @ 07:00  --------------------------------------------------------  IN: 2542 mL / OUT: 2750 mL / NET: -208 mL    05-02 @ 07:01 - 05-02 @ 13:15  --------------------------------------------------------  IN: 74 mL / OUT: 350 mL / NET: -276 mL        PHYSICAL EXAM:    General:  in no acute distress  HEENT: MMM, conjunctiva and sclera clear  Gastrointestinal: Soft non-tender non-distended; Normal bowel sounds; ileostomy and mucus fistula  Skin: Warm and dry. No obvious rash    LABS:      CBC Full  -  ( 02 May 2024 10:00 )  WBC Count : 8.96 K/uL  RBC Count : 2.44 M/uL  Hemoglobin : 7.3 g/dL  Hematocrit : 22.3 %  Platelet Count - Automated : 161 K/uL  Mean Cell Volume : 91.4 fl  Mean Cell Hemoglobin : 29.9 pg  Mean Cell Hemoglobin Concentration : 32.7 gm/dL  Auto Neutrophil # : x  Auto Lymphocyte # : x  Auto Monocyte # : x  Auto Eosinophil # : x  Auto Basophil # : x  Auto Neutrophil % : x  Auto Lymphocyte % : x  Auto Monocyte % : x  Auto Eosinophil % : x  Auto Basophil % : x    05-02    138  |  99  |  47<H>  ----------------------------<  137<H>  3.8   |  33<H>  |  1.56<H>    Ca    7.2<L>      02 May 2024 10:00  Phos  3.1     05-02  Mg     1.9     05-02    TPro  6.2  /  Alb  1.9<L>  /  TBili  6.7<H>  /  DBili  5.0<H>  /  AST  319<H>  /  ALT  162<H>  /  AlkPhos  95  05-01    PT/INR - ( 01 May 2024 09:49 )   PT: 16.1 sec;   INR: 1.43          PTT - ( 01 May 2024 09:49 )  PTT:40.6 sec      Urinalysis Basic - ( 02 May 2024 10:00 )    Color: x / Appearance: x / SG: x / pH: x  Gluc: 137 mg/dL / Ketone: x  / Bili: x / Urobili: x   Blood: x / Protein: x / Nitrite: x   Leuk Esterase: x / RBC: x / WBC x   Sq Epi: x / Non Sq Epi: x / Bacteria: x                RADIOLOGY & ADDITIONAL STUDIES (The following images were personally reviewed):

## 2024-05-02 NOTE — DISCHARGE NOTE PROVIDER - NSDCMRMEDTOKEN_GEN_ALL_CORE_FT
allopurinol 300 mg oral tablet: 1 tab(s) orally once a day  fat emulsion with fish, medium chain, olive, and soy oil 20% intravenous emulsion: 50 gram(s) intravenous once a day  intravenous electrolyte (Lypholyte II/Nutrilyte II/TPN Electrolytes) solution: 1 each intravenous once a day  Lopressor 50 mg oral tablet: 1 tab(s) orally once a day  metoprolol tartrate 50 mg oral tablet: 1 tab(s) orally every 8 hours  pancrelipase 24,000 units-76,000 units-120,000 units oral delayed release capsule: 1 cap(s) orally 3 times a day (with meals)  rosuvastatin 5 mg oral tablet: 1 tab(s) orally once a day  Synthroid 50 mcg (0.05 mg) oral tablet: 1 tab(s) orally once a day  venlafaxine 150 mg oral capsule, extended release: 2 cap(s) orally once a day   allopurinol 300 mg oral tablet: 1 tab(s) orally once a day  fat emulsion with fish, medium chain, olive, and soy oil 20% intravenous emulsion: 50 gram(s) intravenous once a day  intravenous electrolyte (Lypholyte II/Nutrilyte II/TPN Electrolytes) solution: 1 each intravenous once a day  Lopressor 50 mg oral tablet: 1 tab(s) orally once a day  metoprolol tartrate 50 mg oral tablet: 1 tab(s) orally every 8 hours  pancrelipase 24,000 units-76,000 units-120,000 units oral delayed release capsule: 1 cap(s) orally 3 times a day (with meals)  rosuvastatin 5 mg oral tablet: 1 tab(s) orally once a day  Synthroid 50 mcg (0.05 mg) oral tablet: 1 tab(s) orally once a day  venlafaxine 150 mg oral capsule, extended release: 2 cap(s) orally once a day  zolpidem 5 mg oral tablet: 1 tab(s) orally once a day (at bedtime) As needed Insomnia  zolpidem 5 mg oral tablet: 1 tab(s) orally once a day (at bedtime) As needed Insomnia

## 2024-05-02 NOTE — DISCHARGE NOTE PROVIDER - CARE PROVIDER_API CALL
Rikki Peterson.  Colon/Rectal Surgery  [address], [address], [place] [facility][note 3]  Phone: [phone]  Fax: [phone]  Follow Up Time:    Rikki Peterson.  Colon/Rectal Surgery  [address], [facility] [address]  [place] [address]  Phone: [phone]  Fax: [phone]  Follow Up Time:     Reyes Villatoro  Hematology  [address], [facility] [address]  [place] [address]  Phone: [phone]  Fax: [phone]  Established Patient  Follow Up Time:     Daniel Aguiar  Internal Medicine  [address] [address]  Phone: [phone]  Fax: [phone]  Established Patient  Follow Up Time:

## 2024-05-03 NOTE — PROVIDER CONTACT NOTE (OTHER) - ACTION/TREATMENT ORDERED:
Contacted JOSEPH Ludwig at 9:04. Administered 3 units of plasma and asked Queenie Yu MD to do a repeat CBC but was told it was not necessary.

## 2024-05-03 NOTE — DIETITIAN INITIAL EVALUATION ADULT - PERTINENT LABORATORY DATA
05-03    136  |  98  |  44<H>  ----------------------------<  117<H>  3.5   |  33<H>  |  1.73<H>    Ca    7.8<L>      03 May 2024 07:00  Phos  3.0     05-03  Mg     1.8     05-03    TPro  6.3  /  Alb  1.8<L>  /  TBili  6.0<H>  /  DBili  4.2<H>  /  AST  358<H>  /  ALT  169<H>  /  AlkPhos  98  05-03  POCT Blood Glucose.: 144 mg/dL (05-03-24 @ 11:24)  A1C with Estimated Average Glucose Result: <4.2 % (04-17-24 @ 05:30)  A1C with Estimated Average Glucose Result: 5.1 % (09-11-23 @ 04:47)  A1C with Estimated Average Glucose Result: 4.8 % (06-27-23 @ 05:30)

## 2024-05-03 NOTE — DISCHARGE NOTE NURSING/CASE MANAGEMENT/SOCIAL WORK - NSDCVIVACCINE_GEN_ALL_CORE_FT
influenza, high-dose, quadrivalent; 23-Jan-2024 11:29; Deandra Mackay (RN); [employer]; [id number] (Exp. Date: 30-Jun-2024); IntraMuscular; Deltoid Left.; 0.7 milliLiter(s); VIS (VIS Published: 06-Aug-2021, VIS Presented: 23-Jan-2024);

## 2024-05-03 NOTE — PROGRESS NOTE ADULT - SUBJECTIVE AND OBJECTIVE BOX
discussed case with GI, Heme and Internal medicine  correct coags  likely hold off on any further endoscopy  VSS  hgb 8.6 gm

## 2024-05-03 NOTE — PROVIDER CONTACT NOTE (OTHER) - ASSESSMENT
Pt VSS and charted in flow sheet at 8:15.  Pt states "I feel great". No complaints of dizziness or chest pain.

## 2024-05-03 NOTE — PROGRESS NOTE ADULT - REASON FOR ADMISSION
bleeding from fistula and ileostomy

## 2024-05-03 NOTE — DIETITIAN INITIAL EVALUATION ADULT - OTHER INFO
78M, well known to surgical service, with PMHx of AFib/Flutter 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 Prolonged hospital course significant for recurrent AKIs, cholestatic transaminitis s/p perc cony, prior DVTs, PNAs, fungemia, sinus pauses s/p pacemaker and recurrent bleeding from fistula most recently s/p IR embolization of L branch of inferior epigastric artery via R groin access (4/16). Patient now re-presents with bleeding from fistula site and ileostomy site. Patient reports he was planned for outpatient colonoscopy tomorrow with Dr. Yuan. Plan for GI to scope 5/1. Patient is now s/p scope with clip at ulcer base with clot (5/2).    Chart reviewed, pt well known to Nutrition Services. Pt seen at bedside on 8 LA, appears lethargic, BiPAP in place. TPN remains primary means to nutrition, runs over 14 hours. Also ordered for PO Low Fiber diet & 2pks Ottoniel daily. Pt with bowel length <120cm without colon in continuity & high output fistula [>500ml daily]. Insufficient bowel to maintain/restore nutrition status through PO diet alone- unclear amount absorbed from PO diet. Pt denies recent changes to appetite or PO intake. Reported weight stability. Admit wt 80kg likely error, pt 95.8kg per EMR 7 days ago. Will f/u with full NFPE when pt more awake/alert. Noted 1350cc output from fistula x 24hrs- increased protein needs, monitor lytes & closely & replenish outside TPN bag prn. Diet education provided to pt on nutrition plan, slowly adding fiber back to diet as tolerated. Labs reviewed & notable for rise in bilirubin, ALT & AST, elevated BUN & Creat noted, latest  [4/28], K+ 3.5 borderline low [monitor & replete prn]. Meds- on 1 capsule Creon TID, synthroid [administer 30-60 minutes before food; separate at least 4hrs from calcium or iron-containing products or bile acid sequestrants]. RDN will continue to monitor, reassess, and intervene as appropriate.     Pain: no pain/discomfort noted  Skin: LUE edema noted, +jaundice; abd fistula & ileostomy  GI: midline fistula with dark serosanguinous output in bag, ostomy bag with dark serosanguinous output

## 2024-05-03 NOTE — DIETITIAN INITIAL EVALUATION ADULT - PERTINENT MEDS FT
MEDICATIONS  (STANDING):  chlorhexidine 2% Cloths 1 Application(s) Topical <User Schedule>  dextrose 10% Bolus 125 milliLiter(s) IV Bolus once  dextrose 5%. 1000 milliLiter(s) (100 mL/Hr) IV Continuous <Continuous>  dextrose 5%. 1000 milliLiter(s) (50 mL/Hr) IV Continuous <Continuous>  dextrose 50% Injectable 12.5 Gram(s) IV Push once  dextrose 50% Injectable 25 Gram(s) IV Push once  glucagon  Injectable 1 milliGRAM(s) IntraMuscular once  insulin lispro (ADMELOG) corrective regimen sliding scale   SubCutaneous every 6 hours  levothyroxine 50 MICROGram(s) Oral daily  lipid, fat emulsion (Fish Oil and Plant Based) 20% Infusion 0.8 Gm/kG/Day (20.83 mL/Hr) IV Continuous <Continuous>  metoprolol tartrate 50 milliGRAM(s) Oral two times a day  pancrelipase  (CREON 24,000 Lipase Units) 1 Capsule(s) Oral three times a day with meals  Parenteral Nutrition - Adult 1 Each TPN Continuous <Continuous>  Parenteral Nutrition - Adult 1 Each TPN Continuous <Continuous>  venlafaxine XR. 150 milliGRAM(s) Oral daily    MEDICATIONS  (PRN):  dextrose Oral Gel 15 Gram(s) Oral once PRN Blood Glucose LESS THAN 70 milliGRAM(s)/deciliter  ondansetron Injectable 4 milliGRAM(s) IV Push every 6 hours PRN Nausea  zolpidem 5 milliGRAM(s) Oral at bedtime PRN Insomnia  zolpidem 5 milliGRAM(s) Oral at bedtime PRN Insomnia

## 2024-05-03 NOTE — DISCHARGE NOTE NURSING/CASE MANAGEMENT/SOCIAL WORK - PATIENT PORTAL LINK FT
You can access the FollowMyHealth Patient Portal offered by [facility] by registering at the following website: [email or web address]. By joining [facility]’s FollowMyHealth portal, you will also be able to view your health information using other applications (apps) compatible with our system.

## 2024-05-03 NOTE — DIETITIAN INITIAL EVALUATION ADULT - OTHER CALCULATIONS
Ideal body weight (83.4kg) used for calculations as pt noted with large weight discrepancy x 1 wk. Needs estimated for age and adjusted for current clinical status, increased needs for medical condition/illness, wound healing, high output fistula. Fluid needs per team.

## 2024-05-03 NOTE — PROGRESS NOTE ADULT - ASSESSMENT
78M well known to surgical service with PMHx of AFib/Flutter 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 Prolonged hospital course significant for recurrent AKIs, cholestatic transaminitis s/p perc cony, prior DVTs, PNAs, fungemia, sinus pauses s/p pacemaker and recurrent bleeding from fistula most recently s/p IR embolization of L branch of inferior epigastric artery via R groin access (4/16). Patient now re-presents with bleeding from fistula site and ileostomy site. Patient reports he was planned for outpatient colonoscopy tomorrow with Dr. Yuan. Plan for GI to scope 5/1.    Admit to telemetry, Dr. Peterson  NPO/TPN  Pain/nausea control PRN  Home meds as appropriate  GI Consult - f/u scope  Wound care consult  AM labs 
To review CT scan this am
Reviewed CT with Dr Peterson and bleeding issues with Dr Villatoro  To give FFP this am
to get transfused  Rxs per surgical team
78M, well known to surgical service, with PMHx of AFib/Flutter 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 Prolonged hospital course significant for recurrent AKIs, cholestatic transaminitis s/p perc cony, prior DVTs, PNAs, fungemia, sinus pauses s/p pacemaker and recurrent bleeding from fistula most recently s/p IR embolization of L branch of inferior epigastric artery via R groin access (4/16). Patient now re-presents with bleeding from fistula site and ileostomy site. Patient reports he was planned for outpatient colonoscopy tomorrow with Dr. Yuan. Plan for GI to scope 5/1. Patient is now s/p scope with clip at ulcer base with clot (5/1).    Reg diet w/ Ottoniel BID/TPN w/ Vit K   Pain/nausea control PRN  Home meds as appropriate  f/u GI recs  f/u heme/onc recs   Wound care/ostomy RN consult  AM labs 
78M, well known to surgical service, with PMHx of AFib/Flutter 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 Prolonged hospital course significant for recurrent AKIs, cholestatic transaminitis s/p perc cony, prior DVTs, PNAs, fungemia, sinus pauses s/p pacemaker and recurrent bleeding from fistula most recently s/p IR embolization of L branch of inferior epigastric artery via R groin access (4/16). Patient now re-presents with bleeding from fistula site and ileostomy site. Patient reports he was planned for outpatient colonoscopy tomorrow with Dr. Yuan. Plan for GI to scope 5/1. Patient is now s/p scope with clip at ulcer base with clot (5/2).    LRD w/ Ottoniel BID/TPN w/ Vit K   Pain/nausea control PRN  Home meds as appropriate  f/u GI scope today  f/u heme/onc recs; 3U FFPs stat  Wound care/ostomy RN consult  AM labs
For endoscopy this am and CT of A/P

## 2024-05-03 NOTE — DISCHARGE NOTE NURSING/CASE MANAGEMENT/SOCIAL WORK - NSDCPEFALRISK_GEN_ALL_CORE
For information on Fall & Injury Prevention, visit: [email or web address] OR  [email or web address] OR  https://www.cdc.gov/steadi/patient.html

## 2024-05-03 NOTE — DIETITIAN INITIAL EVALUATION ADULT - WEIGHT FOR BMI (LBS)
Price (Do Not Change): 0.00
Instructions: This plan will send the code FBSE to the PM system.  DO NOT or CHANGE the price.
Detail Level: Simple
176.4

## 2024-05-03 NOTE — PROGRESS NOTE ADULT - PROVIDER SPECIALTY LIST ADULT
Gastroenterology
Heme/Onc
Colorectal Surgery
Gastroenterology
Internal Medicine
Internal Medicine
Colorectal Surgery
Colorectal Surgery
Internal Medicine
Surgery

## 2024-05-03 NOTE — DIETITIAN INITIAL EVALUATION ADULT - ADD RECOMMEND
1. TPN as primary means to nutrition -- continue to provide ~80% estimated needs via TPN  - 2.5L TPN via PICC over 14hrs--- 250g Dextrose, 128g AA, 50g SMOF lipids; provides 1862 kcals, 128g protein, GIR 3.72 mg/kg/min  - provides 22.3 kcals/kg, 16.2 non-protein kcals/kg, 1.5g protein IBW  - fluid & lytes per team, monitor outputs & adjust for losses  - recommend to continue zinc and copper in bag 3x per week--- check levels monthly  2. PO diet-- low fiber  - monitor s/s GI distress closely  - c/w 1pk Ottoniel BID; provides 160 kcals, 14g arginine, 14g glutamine, 5g collagen  3. Daily BMP/Mg/Phos, fingersticks Q4-6hrs  - monitor lytes closely & replete outside bag prn  4. Monitor I&Os  5. Weekly weights for trending  6. Obtain weekly lipid panel  - hold / decrease lipid infusion if TG >400  - trend hepatic labs, adjust substrates in bag prn  7.  Consider UUN & fecal fat studies to monitor changes in PO absorption  8. Close monitoring of weights, outputs, labs, adjust TPN as indicated to prevent over/underfeeding

## 2024-05-03 NOTE — DIETITIAN INITIAL EVALUATION ADULT - NSFNSGIIOFT_GEN_A_CORE
05-02-24 @ 07:01  -  05-03-24 @ 07:00  --------------------------------------------------------  OUT:    Ileostomy (mL): 100 mL  Total OUT: 100 mL    Total NET: 1058 mL      05-03-24 @ 07:01  -  05-03-24 @ 14:35  --------------------------------------------------------  OUT:    Ileostomy (mL): 100 mL  Total OUT: 100 mL    Total NET: 344 mL

## 2024-05-03 NOTE — PROGRESS NOTE ADULT - SUBJECTIVE AND OBJECTIVE BOX
Well known to me. Been bleeding on and off for months. Hb 7 INR elevated F XI 43.  Will now try FFP to see if that can help given inr and XI.  May repeat the ffp again later in the day if bleeding.  If this doesn't work would try lysteda

## 2024-05-03 NOTE — PROGRESS NOTE ADULT - SUBJECTIVE AND OBJECTIVE BOX
INTERVAL HPI/OVERNIGHT EVENTS: ligated with vicryl, NPO @ MN fo possible EGD 5/3, lasix 20 given    STATUS POST:  GI scope with clip at ulcer base with clot (5/2)    SUBJECTIVE: Pt seen and examined at bedside this am by surgery team. No acute complaints. Pain well controlled. Denies f/n/v/cp/sob.    MEDICATIONS  (STANDING):  chlorhexidine 2% Cloths 1 Application(s) Topical <User Schedule>  dextrose 10% Bolus 125 milliLiter(s) IV Bolus once  dextrose 5%. 1000 milliLiter(s) (50 mL/Hr) IV Continuous <Continuous>  dextrose 5%. 1000 milliLiter(s) (100 mL/Hr) IV Continuous <Continuous>  dextrose 50% Injectable 25 Gram(s) IV Push once  dextrose 50% Injectable 12.5 Gram(s) IV Push once  glucagon  Injectable 1 milliGRAM(s) IntraMuscular once  insulin lispro (ADMELOG) corrective regimen sliding scale   SubCutaneous every 6 hours  levothyroxine 50 MICROGram(s) Oral daily  lipid, fat emulsion (Fish Oil and Plant Based) 20% Infusion 0.625 Gm/kG/Day (20.8 mL/Hr) IV Continuous <Continuous>  metoprolol tartrate 50 milliGRAM(s) Oral two times a day  pancrelipase  (CREON 24,000 Lipase Units) 1 Capsule(s) Oral three times a day with meals  Parenteral Nutrition - Adult 1 Each TPN Continuous <Continuous>  venlafaxine XR. 150 milliGRAM(s) Oral daily    MEDICATIONS  (PRN):  dextrose Oral Gel 15 Gram(s) Oral once PRN Blood Glucose LESS THAN 70 milliGRAM(s)/deciliter  ondansetron Injectable 4 milliGRAM(s) IV Push every 6 hours PRN Nausea  zolpidem 5 milliGRAM(s) Oral at bedtime PRN Insomnia  zolpidem 5 milliGRAM(s) Oral at bedtime PRN Insomnia      Vital Signs Last 24 Hrs  T(C): 36.8 (03 May 2024 04:51), Max: 36.8 (03 May 2024 04:51)  T(F): 98.2 (03 May 2024 04:51), Max: 98.2 (03 May 2024 04:51)  HR: 91 (03 May 2024 05:42) (90 - 93)  BP: 120/58 (03 May 2024 04:08) (101/52 - 123/67)  BP(mean): 83 (03 May 2024 04:08) (72 - 88)  RR: 18 (03 May 2024 05:42) (16 - 22)  SpO2: 92% (03 May 2024 05:42) (92% - 100%)    Parameters below as of 03 May 2024 05:42  Patient On (Oxygen Delivery Method): room air    PHYSICAL EXAM:    Constitutional: A&Ox3, BIPAP in place, +jaundiced  Respiratory: non labored breathing, no respiratory distress  Cardiovascular: NSR, RRR  Gastrointestinal: Abdomen soft, NTND. Midline fistula pink with healthy granulation tissue, dark SS output in bag. Stoma is pink and patent, ostomy bag with dark SS OP  Genitourinary: voiding  Extremities: wwp, no calf tenderness or edema, +SCDs. LUE edema improving, mildly edematous    I&O's Detail    02 May 2024 07:01  -  03 May 2024 07:00  --------------------------------------------------------  IN:    Fat Emulsion (Fish Oil &amp; Plant Based) 20% Infusion: 291.2 mL    Oral Fluid: 480 mL    TPN (Total Parenteral Nutrition): 1158 mL  Total IN: 1929.2 mL    OUT:    External Ventricular Device (mL): 1350 mL    Ileostomy (mL): 100 mL    Voided (mL): 1350 mL  Total OUT: 2800 mL    Total NET: -870.8 mL          LABS:                        7.3    8.96  )-----------( 161      ( 02 May 2024 10:00 )             22.3     05-02    138  |  99  |  47<H>  ----------------------------<  137<H>  3.8   |  33<H>  |  1.56<H>    Ca    7.2<L>      02 May 2024 10:00  Phos  3.1     05-02  Mg     1.9     05-02    TPro  5.4<L>  /  Alb  1.6<L>  /  TBili  5.4<H>  /  DBili  3.8<H>  /  AST  278<H>  /  ALT  140<H>  /  AlkPhos  89  05-02    PT/INR - ( 01 May 2024 09:49 )   PT: 16.1 sec;   INR: 1.43          PTT - ( 01 May 2024 09:49 )  PTT:40.6 sec  Urinalysis Basic - ( 02 May 2024 10:00 )    Color: x / Appearance: x / SG: x / pH: x  Gluc: 137 mg/dL / Ketone: x  / Bili: x / Urobili: x   Blood: x / Protein: x / Nitrite: x   Leuk Esterase: x / RBC: x / WBC x   Sq Epi: x / Non Sq Epi: x / Bacteria: x        RADIOLOGY & ADDITIONAL STUDIES:

## 2024-05-03 NOTE — DIETITIAN INITIAL EVALUATION ADULT - NAME AND PHONE
Patient is scheduled for a colonoscopy with Dr Keen on 4/21/22. Please send Nulytely prep to patient's selected pharmacy.        Please place COVID order if not already placed and test is required    Please review for Immunocompromised if reviewed prior to June 7,2021    Thank you,  GI Preadmit Surgery Scheduler  
Bella Leung, MS, RDN, CDN [ext. [phone]]

## 2024-05-05 NOTE — ED ADULT NURSE NOTE - NSFALLRISKINTERV_ED_ALL_ED
Assistance OOB with selected safe patient handling equipment if applicable/Assistance with ambulation/Communicate fall risk and risk factors to all staff, patient, and family/Monitor gait and stability/Provide visual cue: yellow wristband, yellow gown, etc/Reinforce activity limits and safety measures with patient and family/Call bell, personal items and telephone in reach/Instruct patient to call for assistance before getting out of bed/chair/stretcher/Non-slip footwear applied when patient is off stretcher/[place] to call system/Physically safe environment - no spills, clutter or unnecessary equipment/Purposeful Proactive Rounding/Room/bathroom lighting operational, light cord in reach

## 2024-05-05 NOTE — H&P ADULT - NSHPPHYSICALEXAM_GEN_ALL_CORE
Physical Exam   Constitutional: A&Ox3, jaundiced   HEENT: PERRLA, EOMI, MMM   Respiratory: non labored breathing, no respiratory distress  Cardiovascular: NSR, RRR  Gastrointestinal: Abdomen soft, NTND. Midline fistula pink with healthy granulation tissue, dark SS output in bag. Stoma is pink and patent, ostomy bag with dark bloody OP  Genitourinary: voiding  Extremities: wwp, no calf tenderness or edema, +SCDs. LUE edema improving, mildly edematous   Neuro: A&O x 4, no focal deficits   Psych: Appropriate mood & affect

## 2024-05-05 NOTE — ED ADULT TRIAGE NOTE - CHIEF COMPLAINT QUOTE
Pt presents via EMS with c/o "low blood pressure", EMS reports "80/30" on scene. Received approx 300 ml of NS in route to ER. Pt reports "bleeding from old surgical site, states I lost about 2 units of blood today". Pt seen in ER, previous visits for same. Currently B/P 101/61. Pt reports bleeding has stopped after direct pressure placed at site.

## 2024-05-05 NOTE — H&P ADULT - ASSESSMENT
78M, well known to surgical service, with PMHx of AFib/Flutter 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 Prolonged hospital course significant for recurrent AKIs, cholestatic transaminitis s/p perc cony, prior DVTs, PNAs, fungemia, sinus pauses s/p pacemaker and recurrent bleeding from fistula most recently s/p IR embolization of L branch of inferior epigastric artery via R groin access (4/16), with multiple readmissions for continued bleeding from fistula and ostomy sites. Patient now re-presents for symptomatic anemia secondary to now resolved bleeding from fistula site and ileostomy sites.     Plan:   - Admit to Tele floor   - PRN pain & nausea control   - Encourage IS/OOB   - Regular diet   - Transfuse 2u pRBC & repeat H&H afterwards   - Consult Dr. Yuan - GI   - Engage PICC team on Monday morning 5/6     Seen and d/w chief resident on call. Attending aware and agrees with plan

## 2024-05-05 NOTE — ED ADULT NURSE NOTE - NS ED NURSE REPORT GIVEN TO FT
Anne CORONA RN Anne CORONA RN report given to Myrly 8 lachman RN Anne CORONA RN report given to Finn JACKSON

## 2024-05-05 NOTE — ED PROVIDER NOTE - CLINICAL SUMMARY MEDICAL DECISION MAKING FREE TEXT BOX
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 AFib/Flutter w/ multiple prior DCCVs, prolonged admission last year for Crohns SBO s/p open TRE and SBR c/b abdominal wall dehiscence and development of enteroatmospheric fistula, hospital course significant for recurrent ELVI, cholestatic transaminitis s/p perc cony, DVTs, PNAs, fungemia, sinus pauses s/p pacemaker and recurrent bleeding from fistula s/p IR embolization of L branch of inferior epigastric artery via R groin access (4/16/24), w/ re-presentation ~2w ago for recurrent bleeding at fistula site requiring transfusions, w/ representation 4/30 for recurrent bleeding at tissue surrounding stoma and from ileostomy site, found to have a circumferential ulcer with a clot in small bowel that was clipped, bleeding from fistula resolved after ligation with vicryl stitch, received 3u FFP on 5/3 and dc'd <48hrs ago now p/w recurrent bleeding from fistula site that began the night he came home from hospital. Currently bleeding is resolved. Feels mild lightheaded/fatigue. States he thinks he lost around 2 units of blood. +Nausea. Chronic unchanged b/l LE edema.   Borderline BP, mildly tachy, other vitals wnl. Exam as above.  ddx: GI bleed, currently resolved. Hx of similar prior.   Surgery aware pt was coming to ED, already at bedside, no current bleeding.   d/w pt possibly getting US IV, pt prefers to use PICC for now.

## 2024-05-05 NOTE — H&P ADULT - NSHPLABSRESULTS_GEN_ALL_CORE
6.6    14.43 )-----------( 153      ( 05 May 2024 00:34 )             20.4       136  |  95<L>  |  45<H>  ----------------------------( 96     05-05 @ 00:34  3.4<L>   |  33<H>  |  1.77<H>    Ca: 7.7<L>   Phos: x    Mg: x     TPro: 6.0 / Alb: 2.1<L> /  TBili 6.6<H> / DBili x  /  <H> /  <H> /  AlkPhos  94       PT/INR - ( 05 May 2024 00:34 )   PT: 14.5 sec;   INR: 1.28          PTT - ( 05 May 2024 00:34 )  PTT:31.9 sec

## 2024-05-05 NOTE — ED ADULT NURSE NOTE - OBJECTIVE STATEMENT
78 yr old male c/o hypotension. Pt states he is having bleeding from his old surgical site that started about a hour ago and has now resolved. Pt has hx of multiple blood transfusions and multiple ER visits for the same issue. Pt states his pressure was low at home and on the ambulance. Pt endorses nausea. Pt denies chest pain, SOB, fevers, chills, vomiting. Respirations spontaneous and unlabored. NAD. A&Ox4.

## 2024-05-05 NOTE — PROGRESS NOTE ADULT - SUBJECTIVE AND OBJECTIVE BOX
Bleeding has continued  Hct decreased  Awake and alert this am BP slightly low  VR 100s Slight LE edema Labs reviewed

## 2024-05-05 NOTE — ED PROVIDER NOTE - PROGRESS NOTE DETAILS
[place]: WBC 14.43, hgb decreased to 6.6 (was 8.5), INR 1.28, K 3.4, cl 95, bicarb 33, BUN/cr 45/1.77 grossly at baseline, albumin 2.1, bili 6.6 (slightly increased from 6), , , , other labs grossly wnl.  surgery at bedside, awaiting recs/dispo.  Pt remains stable. PRBC. holding rate control meds for now, will tx anemia.

## 2024-05-05 NOTE — ED PROVIDER NOTE - NS ED MD TWO NIGHTS YN
Take Motrin as discussed and follow-up with a primary care doctor in 7 to 10 days to recheck your blood pressure as well as for routine health maintenance.  
Yes

## 2024-05-05 NOTE — ED PROVIDER NOTE - PHYSICAL EXAMINATION
large anterior lower abd wall open wound/fistula site. No active bleeding. rest of abd soft NTND.   R ostomy w/ blackish stool/liquid - pt states this is baseline appearance.   1+ b/l Le pitting edema  Pale, slight scleral icterus/jaundice

## 2024-05-05 NOTE — H&P ADULT - NSICDXPASTMEDICALHX_GEN_ALL_CORE_FT
PAST MEDICAL HISTORY:  Atrial fibrillation s/p cardioversion 2010 and 2014  Pt. reports 4 DCCV  Now on Amiodarone 200mg PO bid and Eliquis 5mg PO bid  Last DCCV 4 yrs ago at [facility]    Bradycardia     Crohn's disease s/p partial resection of ileum    Essential hypertension Hypertension    History of depression On Venlafaxine ER 150mg PO bid    Hyperlipidemia     Hypothyroidism     Junctional rhythm

## 2024-05-05 NOTE — ED ADULT TRIAGE NOTE - SOURCE OF INFORMATION
Labs drawn peripherally and dry dressing applied. Specimen was sent to lab.
Home Health RN/Patient/EMS

## 2024-05-05 NOTE — H&P ADULT - HISTORY OF PRESENT ILLNESS
78M well known to surgical service with PMHx of AFib/Flutter 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 Prolonged hospital course significant for recurrent AKIs, cholestatic transaminitis s/p perc cony, prior DVTs, PNAs, fungemia, sinus pauses s/p pacemaker and recurrent bleeding from fistula recently s/p IR embolization of L branch of inferior epigastric artery via R groin access (4/16), with mutiple presentations afterwards for recurrent bleeding at fistula site requiring transfusions.     Patient was discharged 2 days ago and was doing well at home until last night when he began having difficult to control bleeding around ostomy site with associated hypotension and tachycardia. Bleeding eventually resloved but patient reports feeling slightly lightheaded and estimates that he lost about 2 units of blood.    In the ED   Initial vitals: T 98.7, , /61, RR 17, SpO2 97% RA   Labs: WBC 14.43, Hgb 6.6, Plt 153, Na 136, K 3.4, Cl 95, CO2 33, BUN 45, Cr 1.77, Ca 7.7, , , ALT 39   Interventions: 1u pRBC

## 2024-05-05 NOTE — ED PROVIDER NOTE - GASTROINTESTINAL [+], MLM
[facility] OUTPATIENT PROGRESS NOTE    Patient name: Neptali Gonzalez  SOC Date:  07/31/2017  Primary Diagnosis: Cerebral Infarction (Left Hemisphere)  Treatment Diagnosis: Cognitive Impairment  Certification Period:   02/05/2018  to 05/28/2018       Subjective:  Patient was alert and oriented to place, day and time. He participated well in all activities and was very cooperative. Progress was noted with ADL sequencing activities and basic math tasks. Continue current POC.  Patient's response to therapy: Good     Objective:  Goals addressed during today's session:  1) Patient will identify problems and generate 3 solutions to each independently.   2) Patient will organize and sequence 5 step activities with min A with 90% accuracy.    3) Patient will demonstrate recall of functional information following a short-term delay with min A and 90% accuracy.   4) Patient will complete complex reasoning tasks to improve problem solving and safety awareness with 90% accuracy and min A.   5) Patient will complete checkbook/financial balancing tasks with 90% accuracy and min A.      Assessment:  Progress towards goals addressed today:  1) Patient identified problems present in verbal and visual picture scenarios and generated 2 solutions to each with Mod A and 65% accuracy.  2) Patient organized and sequenced 3 step activities with mod A and 65% accuracy.  3) Patient demonstrated recall of 3 pieces of information given verbally, following a short-term delay, with mod A and 70% accuracy.   Progress toward previous goals: Continue STG/LTG      Plan:  Continue current POC   Treatment        Principle of treatment/treatment today: Cognitive-Linguistic Therapy        Treatment time: 12:00-1:00 PM     Follow Up  Follow up in:  1 week      Therapist: Rubi Matias MS/ASA-[facility]  Date: 02/19/2018  
bleeding

## 2024-05-05 NOTE — PROVIDER CONTACT NOTE (OTHER) - RECOMMENDATIONS
Monitor, patient appears stable and in no acute distress. HR 95. SpO2 93% on 2L nasal cannula. Monitor, patient appears stable and in no acute distress. HR 95. SpO2 93% on room air Continue to monitor; patient appears stable and in no acute distress. HR 95. SpO2 93% on room air

## 2024-05-05 NOTE — ED PROVIDER NOTE - OBJECTIVE STATEMENT
none present
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[facility]) PMH AFib/Flutter w/ multiple prior DCCVs, prolonged admission last year for Crohns SBO s/p open TRE and SBR c/b abdominal wall dehiscence and development of enteroatmospheric fistula, hospital course significant for recurrent ELVI, cholestatic transaminitis s/p perc cony, DVTs, PNAs, fungemia, sinus pauses s/p pacemaker and recurrent bleeding from fistula s/p IR embolization of L branch of inferior epigastric artery via R groin access (4/16/24), w/ re-presentation ~2w ago for recurrent bleeding at fistula site requiring transfusions, w/ representation 4/30 for recurrent bleeding at tissue surrounding stoma and from ileostomy site, found to have a circumferential ulcer with a clot in small bowel that was clipped, bleeding from fistula resolved after ligation with vicryl stitch, received 3u FFP on 5/3 and dc'd <48hrs ago now p/w recurrent bleeding from fistula site that began the night he came home from hospital. Currently bleeding is resolved. Feels mild lightheaded/fatigue. States he thinks he lost around 2 units of blood. +Nausea. Chronic unchanged b/l LE edema.   Denies HA, SOB/CP, bleeding from elsewhere, focal weakness/numbness, URI symptoms.

## 2024-05-06 NOTE — CONSULT NOTE ADULT - SUBJECTIVE AND OBJECTIVE BOX
79 yo man with a history of AFib/Flutter s/p DCCV, Crohn's, prior ileocecostomy and open appendectomy, extensive abdominal surgeries and ileostomy presents with persistent anemia     He has had blood transfusions in the past however anemia is ongoing. History of blood loss in the ostomy. Currently with ongoing bleeding from the ostomy.       MEDICATIONS  (STANDING):  atorvastatin 20 milliGRAM(s) Oral at bedtime  chlorhexidine 2% Cloths 1 Application(s) Topical daily  levothyroxine 50 MICROGram(s) Oral daily  lipid, fat emulsion (Fish Oil and Plant Based) 20% Infusion 0.5128 Gm/kG/Day (20.8 mL/Hr) IV Continuous <Continuous>  metoprolol tartrate 50 milliGRAM(s) Oral daily  Parenteral Nutrition - Adult 1 Each TPN Continuous <Continuous>    MEDICATIONS  (PRN):  melatonin 10 milliGRAM(s) Oral once PRN Insomnia  zolpidem 5 milliGRAM(s) Oral at bedtime PRN Insomnia      ICU Vital Signs Last 24 Hrs  T(C): 36.5 (06 May 2024 09:18), Max: 37 (05 May 2024 22:18)  T(F): 97.7 (06 May 2024 09:18), Max: 98.6 (05 May 2024 22:18)  HR: 86 (06 May 2024 08:18) (86 - 114)  BP: 107/53 (06 May 2024 08:18) (95/52 - 116/56)  BP(mean): 75 (06 May 2024 08:18) (71 - 80)  ABP: --  ABP(mean): --  RR: 18 (06 May 2024 08:18) (18 - 19)  SpO2: 95% (06 May 2024 08:18) (91% - 95%)    O2 Parameters below as of 06 May 2024 08:18  Patient On (Oxygen Delivery Method): room air      Physical Exam:    AAOx3  RRR  CTAB  Ileostomy present, stoma is pink and appears healthy. Blood in ostomy bag   Bruising noted on bilateral arms and lower extremities                                      7.8    12.05 )-----------( 136      ( 06 May 2024 05:30 )             23.9       05-06    136  |  97  |  45<H>  ----------------------------<  127<H>  3.7   |  33<H>  |  1.79<H>    Ca    7.6<L>      06 May 2024 05:30  Phos  3.6     05-06  Mg     2.3     05-06    TPro  6.0  /  Alb  2.1<L>  /  TBili  6.8<H>  /  DBili  x   /  AST  316<H>  /  ALT  145<H>  /  AlkPhos  88  05-06

## 2024-05-06 NOTE — PATIENT PROFILE ADULT - TOBACCO USE
[place] [id number]  Phone: [phone]           Saqib Cota MD      March 14, 2022     Patient: Jordy Hutchinson   MR Number: <[id number]>   YOB: 1996   Date of Visit: 3/14/2022       Dear Dr. Turner Vicky: Thank you for referring Scooter Wild to me for evaluation/treatment. Below are the relevant portions of my assessment and plan of care. If you have questions, please do not hesitate to call me. I look forward to following Juju along with you.     Sincerely,        Saqib Cota MD    CC providers:  Roman Wolf MD  [facility], [address] [place] [address]  [address] [place]
Never smoker

## 2024-05-06 NOTE — PROGRESS NOTE ADULT - SUBJECTIVE AND OBJECTIVE BOX
SUBJECTIVE:  Patient was evaluated at bedside this AM by general surgery team. Patient is sleepy this morning but currently denies pain, nausea, or emesis. He denies any active bleeding from fistula. Denies lightheadedness, chest pain, shortness of breath, or calf tenderness.    MEDICATIONS  (STANDING):  atorvastatin 20 milliGRAM(s) Oral at bedtime  levothyroxine 50 MICROGram(s) Oral daily  lipid, fat emulsion (Fish Oil and Plant Based) 20% Infusion 0.5128 Gm/kG/Day (20.8 mL/Hr) IV Continuous <Continuous>  metoprolol tartrate 50 milliGRAM(s) Oral daily  Parenteral Nutrition - Adult 1 Each TPN Continuous <Continuous>    MEDICATIONS  (PRN):  melatonin 10 milliGRAM(s) Oral once PRN Insomnia  zolpidem 5 milliGRAM(s) Oral at bedtime PRN Insomnia      Vital Signs Last 24 Hrs  T(C): 36.9 (06 May 2024 04:42), Max: 37 (05 May 2024 22:18)  T(F): 98.4 (06 May 2024 04:42), Max: 98.6 (05 May 2024 22:18)  HR: 86 (06 May 2024 05:25) (86 - 114)  BP: 116/56 (06 May 2024 05:25) (95/52 - 127/58)  BP(mean): 80 (06 May 2024 05:25) (71 - 80)  RR: 18 (06 May 2024 05:25) (16 - 19)  SpO2: 93% (06 May 2024 05:25) (91% - 95%)    Parameters below as of 06 May 2024 05:25  Patient On (Oxygen Delivery Method): room air        Physical Exam:  General: NAD, resting comfortably in bed  Pulmonary: Nonlabored breathing, no respiratory distress  Cardiovascular: NSR  Abdominal: soft, NT/ND; fistula productive with no blood in appliance; ileostomy with old dark blood in appliance; no active bleeding noted at either site  Extremities: WWP, normal strength, pitting edema in bilateral lower extremities  Neuro: A/O x 3, CNs II-XII grossly intact, no focal deficits, normal motor/sensation  Pulses: palpable distal pulses    I&O's Summary    04 May 2024 07:01  -  05 May 2024 07:00  --------------------------------------------------------  IN: 0 mL / OUT: 560 mL / NET: -560 mL    05 May 2024 07:01  -  06 May 2024 06:59  --------------------------------------------------------  IN: 3049.6 mL / OUT: 3750 mL / NET: -700.4 mL        LABS:                        7.8    12.05 )-----------( 136      ( 06 May 2024 05:30 )             23.9     05-06    136  |  97  |  45<H>  ----------------------------<  127<H>  3.7   |  33<H>  |  1.79<H>    Ca    7.6<L>      06 May 2024 05:30  Phos  3.6     05-06  Mg     2.3     05-06    TPro  6.0  /  Alb  2.1<L>  /  TBili  6.8<H>  /  DBili  x   /  AST  316<H>  /  ALT  145<H>  /  AlkPhos  88  05-06    PT/INR - ( 05 May 2024 12:00 )   PT: 14.2 sec;   INR: 1.25          PTT - ( 05 May 2024 12:00 )  PTT:33.5 sec  Urinalysis Basic - ( 06 May 2024 05:30 )    Color: x / Appearance: x / SG: x / pH: x  Gluc: 127 mg/dL / Ketone: x  / Bili: x / Urobili: x   Blood: x / Protein: x / Nitrite: x   Leuk Esterase: x / RBC: x / WBC x   Sq Epi: x / Non Sq Epi: x / Bacteria: x      CAPILLARY BLOOD GLUCOSE      POCT Blood Glucose.: 121 mg/dL (05 May 2024 17:22)  POCT Blood Glucose.: 121 mg/dL (05 May 2024 11:37)    LIVER FUNCTIONS - ( 06 May 2024 05:30 )  Alb: 2.1 g/dL / Pro: 6.0 g/dL / ALK PHOS: 88 U/L / ALT: 145 U/L / AST: 316 U/L / GGT: x             RADIOLOGY & ADDITIONAL STUDIES:

## 2024-05-06 NOTE — PROGRESS NOTE ADULT - ASSESSMENT
78M, well known to surgical service, with PMHx of AFib/Flutter 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 Prolonged hospital course significant for recurrent AKIs, cholestatic transaminitis s/p perc cony, prior DVTs, PNAs, fungemia, sinus pauses s/p pacemaker and recurrent bleeding from fistula most recently s/p IR embolization of L branch of inferior epigastric artery via R groin access (4/16), with multiple readmissions for continued bleeding from fistula and ostomy sites. Patient now re-presents for symptomatic anemia secondary to now resolved bleeding from fistula site and ileostomy sites. Received 2 units pRBCs (5/5).     Regular diet/TPN    pain & nausea control PRN    Encourage IS/OOB/SCDs   Holding SQH   Consult Dr. Yuan - GI  Consult PICC team    AM labs

## 2024-05-06 NOTE — PATIENT PROFILE ADULT - FALL HARM RISK - RISK INTERVENTIONS

## 2024-05-06 NOTE — PROGRESS NOTE ADULT - ASSESSMENT
as noted CT with bowel wall thickening  discussed with Dr Trejo  trial of IV steroids medrol 40 mg IV q 8and see if it helps

## 2024-05-06 NOTE — PROGRESS NOTE ADULT - SUBJECTIVE AND OBJECTIVE BOX
Pt seen and examined   no complaints but b;ood coming out of stoma    REVIEW OF SYSTEMS:  Constitutional: No fever, weight loss or fatigue  Cardiovascular: No chest pain, palpitations, dizziness or leg swelling  Gastrointestinal: No abdominal or epigastric pain. No nausea, vomiting, as above  Skin: No itching, burning, rashes or lesions       MEDICATIONS:  MEDICATIONS  (STANDING):  atorvastatin 20 milliGRAM(s) Oral at bedtime  chlorhexidine 2% Cloths 1 Application(s) Topical daily  cholestyramine Powder (Sugar-Free) 4 Gram(s) Oral every 24 hours  levothyroxine 50 MICROGram(s) Oral daily  lipid, fat emulsion (Fish Oil and Plant Based) 20% Infusion 0.5128 Gm/kG/Day (20.83 mL/Hr) IV Continuous <Continuous>  methylPREDNISolone sodium succinate Injectable 40 milliGRAM(s) IV Push every 8 hours  metoprolol tartrate 50 milliGRAM(s) Oral daily  pancrelipase  (CREON 24,000 Lipase Units) 1 Capsule(s) Oral three times a day with meals  Parenteral Nutrition - Adult 1 Each TPN Continuous <Continuous>  Parenteral Nutrition - Adult 1 Each TPN Continuous <Continuous>  venlafaxine XR. 300 milliGRAM(s) Oral daily    MEDICATIONS  (PRN):  melatonin 10 milliGRAM(s) Oral once PRN Insomnia  zolpidem 5 milliGRAM(s) Oral at bedtime PRN Insomnia      Allergies    penicillin (Angioedema)    Intolerances        Vital Signs Last 24 Hrs  T(C): 36.7 (06 May 2024 14:04), Max: 37 (05 May 2024 22:18)  T(F): 98 (06 May 2024 14:04), Max: 98.6 (05 May 2024 22:18)  HR: 88 (06 May 2024 12:01) (86 - 102)  BP: 94/54 (06 May 2024 12:01) (94/54 - 116/56)  BP(mean): 71 (06 May 2024 12:01) (71 - 80)  RR: 18 (06 May 2024 12:01) (18 - 18)  SpO2: 94% (06 May 2024 12:01) (91% - 95%)    Parameters below as of 06 May 2024 12:01  Patient On (Oxygen Delivery Method): room air        05-05 @ 07:01  -  05-06 @ 07:00  --------------------------------------------------------  IN: 3049.6 mL / OUT: 3750 mL / NET: -700.4 mL    05-06 @ 07:01  -  05-06 @ 14:47  --------------------------------------------------------  IN: 550.8 mL / OUT: 550 mL / NET: 0.8 mL        PHYSICAL EXAM:    General: v in no acute distress  HEENT: MMM, conjunctiva and sclera clear  Gastrointestinal: Soft non-tender non-distended; Normal bowel sounds; blood from ilesotomy, mucus fistula normal stool  Skin: Warm and dry. No obvious rash    LABS:      CBC Full  -  ( 06 May 2024 05:30 )  WBC Count : 12.05 K/uL  RBC Count : 2.63 M/uL  Hemoglobin : 7.8 g/dL  Hematocrit : 23.9 %  Platelet Count - Automated : 136 K/uL  Mean Cell Volume : 90.9 fl  Mean Cell Hemoglobin : 29.7 pg  Mean Cell Hemoglobin Concentration : 32.6 gm/dL  Auto Neutrophil # : 9.19 K/uL  Auto Lymphocyte # : 1.29 K/uL  Auto Monocyte # : 1.34 K/uL  Auto Eosinophil # : 0.12 K/uL  Auto Basophil # : 0.06 K/uL  Auto Neutrophil % : 76.3 %  Auto Lymphocyte % : 10.7 %  Auto Monocyte % : 11.1 %  Auto Eosinophil % : 1.0 %  Auto Basophil % : 0.5 %    05-06    136  |  97  |  45<H>  ----------------------------<  127<H>  3.7   |  33<H>  |  1.79<H>    Ca    7.6<L>      06 May 2024 05:30  Phos  3.6     05-06  Mg     2.3     05-06    TPro  6.0  /  Alb  2.1<L>  /  TBili  6.8<H>  /  DBili  x   /  AST  316<H>  /  ALT  145<H>  /  AlkPhos  88  05-06    PT/INR - ( 05 May 2024 12:00 )   PT: 14.2 sec;   INR: 1.25          PTT - ( 05 May 2024 12:00 )  PTT:33.5 sec      Urinalysis Basic - ( 06 May 2024 05:30 )    Color: x / Appearance: x / SG: x / pH: x  Gluc: 127 mg/dL / Ketone: x  / Bili: x / Urobili: x   Blood: x / Protein: x / Nitrite: x   Leuk Esterase: x / RBC: x / WBC x   Sq Epi: x / Non Sq Epi: x / Bacteria: x                RADIOLOGY & ADDITIONAL STUDIES (The following images were personally reviewed):

## 2024-05-06 NOTE — CONSULT NOTE ADULT - ASSESSMENT
Patient is a 77yo M with PMH of AFib/Flutter s/p DCCV, Crohn's, prior ileocectomy and open appendectomy, extensive abdominal surgeries and ileostomy, short-bowel syndrome, hypothyroidism, depression, recently seen at the Emergency Department for blood transfusion and multiple recent admissions for transfusions who presents as a direct admission for recurrent anemia. FFP x 3 on May 3rd.     - Factors IX and XI are low consistent with liver dysfunction. Low-normal Fibrinogen    - Reason for liver dysfunction is unexplained. Recommend hepatology consult   - Rule out hemolysis: Check haptoglobin and fractionated bilirubin. Peripheral smear for evaluation of schistocytes.   - Bleeding is likely associated with liver dysregulation and decreased factor production. Continue Vit K with TPN.   - FFP as needed. There is no goal INR so this will need to be done based on clinical judgement and if persistent hemoglobin decline       Will continue to follow    Dr Maria T Walker for Dr Jonathan Villatoro

## 2024-05-07 NOTE — PROGRESS NOTE ADULT - ASSESSMENT
78M, well known to surgical service, with PMHx of AFib/Flutter 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 Prolonged hospital course significant for recurrent AKIs, cholestatic transaminitis s/p perc cony, prior DVTs, PNAs, fungemia, sinus pauses s/p pacemaker and recurrent bleeding from fistula most recently s/p IR embolization of L branch of inferior epigastric artery via R groin access (4/16), with multiple readmissions for continued bleeding from fistula and ostomy sites. Patient now re-presents for symptomatic anemia secondary to now resolved bleeding from fistula site and ileostomy sites. Received 2 units pRBCs (5/5).     Tube study 5/7  NPO/TPN    pain & nausea control PRN  Home meds as appropriate  Lasix PRN  Encourage IS/OOB/SCDs   Holding SQH   BIPAP overnight  AM labs

## 2024-05-07 NOTE — PROGRESS NOTE ADULT - SUBJECTIVE AND OBJECTIVE BOX
Pt seen and examined   contrast study done  less bleed today    REVIEW OF SYSTEMS:  Constitutional: No fever, weight loss   Cardiovascular: No chest pain, palpitations, dizziness   Gastrointestinal: No abdominal or epigastric pain. No nausea, vomiting less bleed  Skin: No itching, burning, rashes or lesions       MEDICATIONS:  MEDICATIONS  (STANDING):  atorvastatin 20 milliGRAM(s) Oral at bedtime  chlorhexidine 2% Cloths 1 Application(s) Topical daily  cholestyramine Powder (Sugar-Free) 4 Gram(s) Oral every 24 hours  levothyroxine 50 MICROGram(s) Oral daily  lipid, fat emulsion (Fish Oil and Plant Based) 20% Infusion 0.52 Gm/kG/Day (20.83 mL/Hr) IV Continuous <Continuous>  methylPREDNISolone sodium succinate Injectable 40 milliGRAM(s) IV Push every 8 hours  metoprolol tartrate 50 milliGRAM(s) Oral daily  pancrelipase  (CREON 24,000 Lipase Units) 1 Capsule(s) Oral three times a day with meals  Parenteral Nutrition - Adult 1 Each TPN Continuous <Continuous>  Parenteral Nutrition - Adult 1 Each TPN Continuous <Continuous>  venlafaxine XR. 300 milliGRAM(s) Oral daily    MEDICATIONS  (PRN):  zolpidem 5 milliGRAM(s) Oral at bedtime PRN Insomnia      Allergies    penicillin (Angioedema)    Intolerances        Vital Signs Last 24 Hrs  T(C): 36.5 (07 May 2024 08:04), Max: 36.7 (06 May 2024 14:04)  T(F): 97.7 (07 May 2024 08:04), Max: 98 (06 May 2024 14:04)  HR: 90 (07 May 2024 08:33) (84 - 104)  BP: 117/54 (07 May 2024 08:33) (94/54 - 128/58)  BP(mean): 78 (07 May 2024 08:33) (68 - 84)  RR: 17 (07 May 2024 08:33) (16 - 19)  SpO2: 97% (07 May 2024 08:33) (92% - 100%)    Parameters below as of 07 May 2024 08:33  Patient On (Oxygen Delivery Method): room air        05-06 @ 07:01  -  05-07 @ 07:00  --------------------------------------------------------  IN: 3419.6 mL / OUT: 2050 mL / NET: 1369.6 mL    05-07 @ 07:01  -  05-07 @ 11:45  --------------------------------------------------------  IN: 0 mL / OUT: 200 mL / NET: -200 mL        PHYSICAL EXAM:    General: in no acute distress  HEENT: MMM, conjunctiva and sclera clear  Gastrointestinal: Soft non-tender non-distended; Normal bowel sounds; ileostomy some blood, mucus fistula   Skin: Warm and dry. No obvious rash    LABS:      CBC Full  -  ( 07 May 2024 05:30 )  WBC Count : 16.16 K/uL  RBC Count : 2.74 M/uL  Hemoglobin : 8.0 g/dL  Hematocrit : 25.2 %  Platelet Count - Automated : 134 K/uL  Mean Cell Volume : 92.0 fl  Mean Cell Hemoglobin : 29.2 pg  Mean Cell Hemoglobin Concentration : 31.7 gm/dL  Auto Neutrophil # : 15.05 K/uL  Auto Lymphocyte # : 0.61 K/uL  Auto Monocyte # : 0.40 K/uL  Auto Eosinophil # : 0.00 K/uL  Auto Basophil # : 0.02 K/uL  Auto Neutrophil % : 93.1 %  Auto Lymphocyte % : 3.8 %  Auto Monocyte % : 2.5 %  Auto Eosinophil % : 0.0 %  Auto Basophil % : 0.1 %    05-07    135  |  99  |  56<H>  ----------------------------<  182<H>  4.4   |  30  |  1.57<H>    Ca    7.6<L>      07 May 2024 05:30  Phos  3.0     05-07  Mg     2.6     05-07    TPro  6.1  /  Alb  2.0<L>  /  TBili  6.9<H>  /  DBili  x   /  AST  383<H>  /  ALT  183<H>  /  AlkPhos  90  05-07    PT/INR - ( 07 May 2024 05:30 )   PT: 15.3 sec;   INR: 1.35          PTT - ( 07 May 2024 05:30 )  PTT:32.0 sec      Urinalysis Basic - ( 07 May 2024 05:30 )    Color: x / Appearance: x / SG: x / pH: x  Gluc: 182 mg/dL / Ketone: x  / Bili: x / Urobili: x   Blood: x / Protein: x / Nitrite: x   Leuk Esterase: x / RBC: x / WBC x   Sq Epi: x / Non Sq Epi: x / Bacteria: x                RADIOLOGY & ADDITIONAL STUDIES (The following images were personally reviewed):

## 2024-05-07 NOTE — PROGRESS NOTE ADULT - SUBJECTIVE AND OBJECTIVE BOX
INTERVAL HPI/OVERNIGHT EVENTS:    STATUS POST:      POST OPERATIVE DAY #:     SUBJECTIVE:  Flatus: [ ] YES [ ] NO             Bowel Movement: [ ] YES [ ] NO  Pain (0-10):            Pain Control Adequate: [ ] YES [ ] NO  Nausea: [ ] YES [ ] NO            Vomiting: [ ] YES [ ] NO  Diarrhea: [ ] YES [ ] NO         Constipation: [ ] YES [ ] NO     Chest Pain: [ ] YES [ ] NO    SOB:  [ ] YES [ ] NO    MEDICATIONS  (STANDING):  atorvastatin 20 milliGRAM(s) Oral at bedtime  chlorhexidine 2% Cloths 1 Application(s) Topical daily  cholestyramine Powder (Sugar-Free) 4 Gram(s) Oral every 24 hours  levothyroxine 50 MICROGram(s) Oral daily  lipid, fat emulsion (Fish Oil and Plant Based) 20% Infusion 0.5128 Gm/kG/Day (20.83 mL/Hr) IV Continuous <Continuous>  methylPREDNISolone sodium succinate Injectable 40 milliGRAM(s) IV Push every 8 hours  metoprolol tartrate 50 milliGRAM(s) Oral daily  pancrelipase  (CREON 24,000 Lipase Units) 1 Capsule(s) Oral three times a day with meals  Parenteral Nutrition - Adult 1 Each TPN Continuous <Continuous>  venlafaxine XR. 300 milliGRAM(s) Oral daily    MEDICATIONS  (PRN):  zolpidem 5 milliGRAM(s) Oral at bedtime PRN Insomnia      Vital Signs Last 24 Hrs  T(C): 35.8 (07 May 2024 05:16), Max: 36.7 (06 May 2024 14:04)  T(F): 96.5 (07 May 2024 05:16), Max: 98 (06 May 2024 14:04)  HR: 100 (07 May 2024 06:14) (84 - 104)  BP: 122/63 (07 May 2024 03:44) (94/54 - 128/58)  BP(mean): 84 (07 May 2024 03:44) (68 - 84)  RR: 16 (07 May 2024 06:14) (16 - 19)  SpO2: 98% (07 May 2024 06:14) (92% - 100%)    Parameters below as of 07 May 2024 06:14  Patient On (Oxygen Delivery Method): room air        PHYSICAL EXAM:  Constitutional: A&Ox3, resting comfortably in bed, BIPAP in place  Respiratory: non labored breathing, no respiratory distress  Cardiovascular: NSR, RRR  Gastrointestinal: Abdomen soft, NTND. fistula productive with minimal blood noted. Ileostomy with minimal blood output and surgicell visible. No active bleeding visible at either site.  Genitourinary: Voiding  Extremities: wwp, mild BL lower extremity edema, no calf tenderness, +SCDs          I&O's Detail    06 May 2024 07:01  -  07 May 2024 07:00  --------------------------------------------------------  IN:    Fat Emulsion (Fish Oil &amp; Plant Based) 20% Infusion: 20.8 mL    Fat Emulsion (Fish Oil &amp; Plant Based) 20% Infusion: 228.8 mL    Oral Fluid: 720 mL    Other (mL): 20 mL    TPN (Total Parenteral Nutrition): 2430 mL  Total IN: 3419.6 mL    OUT:    Ileostomy (mL): 1000 mL    Voided (mL): 1050 mL  Total OUT: 2050 mL    Total NET: 1369.6 mL        LABS:                        8.0    16.16 )-----------( 134      ( 07 May 2024 05:30 )             25.2     05-07    135  |  99  |  56<H>  ----------------------------<  182<H>  4.4   |  30  |  1.57<H>    Ca    7.6<L>      07 May 2024 05:30  Phos  3.0     05-07  Mg     2.6     05-07    TPro  6.1  /  Alb  2.0<L>  /  TBili  6.9<H>  /  DBili  x   /  AST  383<H>  /  ALT  183<H>  /  AlkPhos  90  05-07    PT/INR - ( 07 May 2024 05:30 )   PT: 15.3 sec;   INR: 1.35          PTT - ( 07 May 2024 05:30 )  PTT:32.0 sec  Urinalysis Basic - ( 07 May 2024 05:30 )    Color: x / Appearance: x / SG: x / pH: x  Gluc: 182 mg/dL / Ketone: x  / Bili: x / Urobili: x   Blood: x / Protein: x / Nitrite: x   Leuk Esterase: x / RBC: x / WBC x   Sq Epi: x / Non Sq Epi: x / Bacteria: x        RADIOLOGY & ADDITIONAL STUDIES:

## 2024-05-08 NOTE — PROGRESS NOTE ADULT - ASSESSMENT
78M, well known to surgical service, with PMHx of AFib/Flutter 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 Prolonged hospital course significant for recurrent AKIs, cholestatic transaminitis s/p perc cony, prior DVTs, PNAs, fungemia, sinus pauses s/p pacemaker and recurrent bleeding from fistula most recently s/p IR embolization of L branch of inferior epigastric artery via R groin access (4/16), with multiple readmissions for continued bleeding from fistula and ostomy sites. Patient now re-presents for symptomatic anemia secondary to now resolved bleeding from fistula site and ileostomy sites. Received 2 units pRBCs (5/5).     Regular diet/TPN    pain & nausea control PRN  Home meds as appropriate  Lasix PRN  Encourage IS/OOB/SCDs   Holding SQH   BIPAP overnight  AM labs

## 2024-05-08 NOTE — DISCHARGE NOTE PROVIDER - NSDCCPCAREPLAN_GEN_ALL_CORE_FT
PRINCIPAL DISCHARGE DIAGNOSIS  Diagnosis: GI bleed  Assessment and Plan of Treatment: via fistula and donny-fistula granulation tissue      SECONDARY DISCHARGE DIAGNOSES  Diagnosis: Anemia  Assessment and Plan of Treatment:

## 2024-05-08 NOTE — DISCHARGE NOTE PROVIDER - NSDCFUADDINST_GEN_ALL_CORE_FT
Transfer is from [facility] SICU under Dr. Rikki Peterson to the Transplant ICU at [facility] under Dr. Leo Chery.     Nutrition Recommendations for DIET:  - C/w CLD as tolerated  - C/w TPN via PICC as primary means to nutrition- recommend 370g Dextrose, 120g AA, 56g Omegaven; provides provides 2362 kcals, 136g protein, GIR 2.87 mg/kg/min   - provides 28.3 kcals/kg, 21.8 non-protein kcals/kg, 1.6g protein/kg IBW. C/w Smoflipids 50g once a week every Monday. Please continue with Omegaven daily from Tuesday until Sunday each week.    - monitor renal function, increase amino acid provision to meet needs/account for losses as renal function improves --> goal: 2g/kg IB    Medications given at [facility] at time of transfer:  Ancef 2g q12 (5/10-6/7) for MSSA bacteremia dx on 5/7  TPN  Ursodiol 300mg q8 for hyperbilirubinemia  Ativan 0.25mcg prn for anxiety  metoprolol 5mg q6 IV  hydrocortisone enema 100mg q12 via ILEOSTOMY for suspected crohns flare proximal to fistula spout  Zofran 4mg q6 prn for nausea  Lipitor 20mg bedtime for HLD  synthroid 40mcg IV for hypothyroidism  venlafaxine XR 300mg daily  Creon (pancrelipase) 1 capsule TID with meals  cholestyramine 4mg q24  duoneb q6 hours standing  Melatonin 5mg bedtime  Nightly BIPAP (or prn)    Patient-Specific Anatomy:  End ileostomy is present in RUQ of abdomen. There is approximately 25cm segment from the end ileostomy to the midline enterocutaneous fistula. There is surrounding granulation tissue. There is approximately 65cm of bowel proximal to the ECF. There was a prior mucus fistula in the LUQ from the descending colon, the skin has healed overlying the mucus fistula. The descending colon remains intact.     FOR BLEEDING:  - Bleeding is typically from the granulation tissue surrounding the fistula, however it has bled from the fistula itself as well (there is an erythematous segment of small bowel just deep to the external portion of the ECF.) The bleeding from the granulation tissue is from a different location each time. To stop the bleeding, open the midline Eakins pouch, localize bleeding area with suction. Hold pressure and apply surgicell vs Vicryl suture overlying bleeding area. May use QuickClot or Surgiflo as adjuncts as well. Transfuse for Hgb <8, monitor coags and fibrinogen to assess need for additional product.     General Discharge Instructions:  Please resume all medications as outlined above.   Please get plenty of rest, continue to ambulate several times per day, and drink adequate amounts of fluids. Avoid lifting weights greater than 5-10 lbs until you follow-up with your surgeon, who will instruct you further regarding activity restrictions.  Avoid driving or operating heavy machinery while taking pain medications.  Please follow-up with your surgeon and Primary Care Provider (PCP) as advised.  Incision Care:  *Please call your doctor or nurse practitioner if you have increased pain, swelling, redness, or drainage from the incision site.  *Avoid swimming and baths until your follow-up appointment.    Warning Signs:  Please call your doctor or nurse practitioner if you experience the following:  *You experience new chest pain, pressure, squeezing or tightness.  *New or worsening cough, shortness of breath, or wheeze.  *If you are vomiting and cannot keep down fluids or your medications.  *You are getting dehydrated due to continued vomiting, diarrhea, or other reasons. Signs of dehydration include dry mouth, rapid heartbeat, or feeling dizzy or faint when standing.  *You see blood or dark/black material when you vomit or have a bowel movement.  *You experience burning when you urinate, have blood in your urine, or experience a discharge.  *Your pain is not improving within 8-12 hours or is not gone within 24 hours. Call or return immediately if your pain is getting worse, changes location, or moves to your chest or back.  *You have shaking chills, or fever greater than 101.5 degrees Fahrenheit or 38 degrees Celsius.  *Any change in your symptoms, or any new symptoms that concern you.

## 2024-05-08 NOTE — DISCHARGE NOTE PROVIDER - NSDCCPTREATMENT_GEN_ALL_CORE_FT
PRINCIPAL PROCEDURE  Procedure: Diagnostic ileoscopy  Findings and Treatment: s/p ileoscopy, noting erythematous patch of bowel just deep to the ECF, without any active bleeding (5/9)      SECONDARY PROCEDURE  Procedure: Abdominal angiography  Findings and Treatment: S/p IR angiography noting  several areas of hyperemia in the jejunum adjacent to the stoma however no evidence of active bleeding (5/9)

## 2024-05-08 NOTE — DISCHARGE NOTE PROVIDER - PROVIDER TOKENS
PROVIDER:[TOKEN:[[id number]],FOLLOWUP:[1 month]],PROVIDER:[TOKEN:[[id number]],FOLLOWUP:[1 month]],PROVIDER:[TOKEN:[[id number]],FOLLOWUP:[1 month]]

## 2024-05-08 NOTE — PROGRESS NOTE ADULT - SUBJECTIVE AND OBJECTIVE BOX
SUBJECTIVE: Patient seen on morning rounds resting comfortably in bed. Patient states he feels good. Denies       MEDICATIONS  (STANDING):  atorvastatin 20 milliGRAM(s) Oral at bedtime  chlorhexidine 2% Cloths 1 Application(s) Topical daily  cholestyramine Powder (Sugar-Free) 4 Gram(s) Oral every 24 hours  levothyroxine 50 MICROGram(s) Oral daily  lipid, fat emulsion (Fish Oil and Plant Based) 20% Infusion 0.52 Gm/kG/Day (20.83 mL/Hr) IV Continuous <Continuous>  metoprolol tartrate 50 milliGRAM(s) Oral daily  pancrelipase  (CREON 24,000 Lipase Units) 1 Capsule(s) Oral three times a day with meals  Parenteral Nutrition - Adult 1 Each TPN Continuous <Continuous>  venlafaxine XR. 300 milliGRAM(s) Oral daily    MEDICATIONS  (PRN):  zolpidem 5 milliGRAM(s) Oral at bedtime PRN Insomnia      Vital Signs Last 24 Hrs  T(C): 36.3 (07 May 2024 22:06), Max: 36.3 (07 May 2024 22:06)  T(F): 97.3 (07 May 2024 22:06), Max: 97.3 (07 May 2024 22:06)  HR: 96 (08 May 2024 03:44) (84 - 96)  BP: 129/60 (08 May 2024 03:44) (107/55 - 148/63)  BP(mean): 87 (08 May 2024 03:44) (72 - 90)  RR: 18 (08 May 2024 03:44) (16 - 19)  SpO2: 96% (08 May 2024 03:44) (93% - 100%)    Parameters below as of 08 May 2024 03:44  Patient On (Oxygen Delivery Method): BiPAP/CPAP    O2 Concentration (%): 40    PHYSICAL EXAM:    Constitutional: A&Ox3    Respiratory: non labored breathing, no respiratory distress    Gastrointestinal: soft, NT/ND. Fistula productive with no blood noted, no active bleeding. Ileostomy with minimal output and surgicell visible, no active bleeding     Extremities: (-) edema                  I&O's Detail    07 May 2024 07:01  -  08 May 2024 07:00  --------------------------------------------------------  IN:    Fat Emulsion (Fish Oil &amp; Plant Based) 20% Infusion: 270.4 mL    TPN (Total Parenteral Nutrition): 2427 mL  Total IN: 2697.4 mL    OUT:    Drain (mL): 800 mL    Ileostomy (mL): 800 mL    Voided (mL): 1150 mL  Total OUT: 2750 mL    Total NET: -52.6 mL          LABS:                        7.6    26.76 )-----------( 135      ( 08 May 2024 05:15 )             23.3     05-08    135  |  102  |  64<H>  ----------------------------<  209<H>  5.2   |  25  |  1.73<H>    Ca    7.4<L>      08 May 2024 05:15  Phos  3.9     05-08  Mg     2.7     05-08    TPro  5.4<L>  /  Alb  1.7<L>  /  TBili  6.8<H>  /  DBili  x   /  AST  423<H>  /  ALT  227<H>  /  AlkPhos  84  05-08    PT/INR - ( 07 May 2024 05:30 )   PT: 15.3 sec;   INR: 1.35          PTT - ( 07 May 2024 05:30 )  PTT:32.0 sec  Urinalysis Basic - ( 08 May 2024 05:15 )    Color: x / Appearance: x / SG: x / pH: x  Gluc: 209 mg/dL / Ketone: x  / Bili: x / Urobili: x   Blood: x / Protein: x / Nitrite: x   Leuk Esterase: x / RBC: x / WBC x   Sq Epi: x / Non Sq Epi: x / Bacteria: x        RADIOLOGY & ADDITIONAL STUDIES:

## 2024-05-08 NOTE — CHART NOTE - NSCHARTNOTEFT_GEN_A_CORE
Nurse paged Team 5 regarding high blood tinged output from fistula. Provider responded immediately. On assessment patient was HD stable and another 1500cc of blood was suctioned from the fistula. Stat CBC was ordered at that time. At that time, rapid was called. Additional stat labs were drawn. A 20 G IV was placed in the L AC. He was given 1L of fluids and 2 units of pRBCS. During this period, patient was consistently HD monitored. He was then stepped up to the ICU for closer monitoring and further care.

## 2024-05-08 NOTE — DISCHARGE NOTE PROVIDER - HOSPITAL COURSE
78M, well known to surgical service, with PMHx of AFib/Flutter with numerous DCCVs in the past with prolonged admission last year for Crohns SBO s/p open TRE and SBR complicated by abdominal wall dehiscence and development of enteroatmospheric fistula. Prolonged hospital course significant for recurrent AKIs, cholestatic transaminitis s/p perc cony, prior DVTs, PNAs, fungemia, sinus pauses s/p pacemaker and recurrent bleeding from fistula most recently s/p IR embolization of L branch of inferior epigastric artery via R groin access (4/16), with multiple readmissions for continued bleeding from fistula and ostomy sites. Patient now re-presents for symptomatic anemia secondary to now resolved bleeding from fistula site and ileostomy sites. Received 2 units pRBCs (5/5) with a follow up hemoglobin of 7.9 from 6.6. Hematology/oncology, hepatology, and Dr. Yuan were consulted. Dr. Yuan    Throughout course TPN was ordered and administered via PICC line.     ***INCOMPLETE 5/8     78M w PMH of HTN, Afib/AFlutter with prior DCCVs, Bradycardia s/p PPM, HLD, hypothyroidism, Crohn's Disease, depression, prolonged hospital course 6/2023 for SBO with subseqent open TRE and SBR c/b abdominal wall dehiscence with developement of enteroatmospheric fistula (ECF). Prolonged stay complicated by recurrent AKIs, cholestatic transminitis s/p perc cony, DVTs, PNA, fungemia, sinus pauses with pacemake placement and multiple episodes of bleeding from the ECF, most recently with embolization of the L. branch of the inferior epigastric artery. Admitted 5/5 for bright red blood coming from ECF requiring mutliple transfusions. Rapid response called 5/8 for 2L of bright red bloody output from the ECF with subsequent hypotension and transfer to SICU overnight. Ileoscopy 5/9 showing erythematous patch of bowel just deep to the ECF. IR 5/9 with segmental hyperemia just proximal to the fistula site, unable to embolize 2/2 concern for bowel ischemia. Patient also found to have a worsening ELVI, increasing leukocytosis, though was noted to be on IV steroids q8 at the time for bowel wall thickening per GI and hyperbilirubinemia (Max T.bili 9.7). Blood Cx positive for MSSA. Long standing PICC for TPN removed. Echo negative for vegetation. Cefepime (5/9-5/10) transitioned to 2g Cefazolin 5/10-6/10. New MAURICIO PICC placed on 5/17 after negative Blood Cx. ELVI continues to improve after resuscitation, baseline creatinine 1.2-1.4. Right upper abd ultrasound significant for portal vein thrombus, but AC was not given in setting of recurrent bleeds. T. bili improving with restarting of ursodiol. Given total body overload in setting of multiple transfusions, patient has been receiving Lasix PRN as tolerated. Total products given during hospital course are 15 units of PRBCs, 2FFP, 3 platelets and 2 of cryo given between 5/5-5/20. Patient has bled from granulation tissue surrounding fistula site every few days, most recently on 5/19 at night. Patient currently has persistent elevation in WBC, despite remaining afebrile and at baseline health, BCx sent 5/20. Patient is being transferred to [place] to pursue further surgical care with Dr. Leo Chery.

## 2024-05-08 NOTE — CONSULT NOTE ADULT - ATTENDING COMMENTS
78 M with depression, AFib/Flutter (numerous DCCVs), sinus pauses s/p pacemaker, Crohn's c/b SBO s/p open TRE and SBR c/b abdominal wall dehiscence and development of extra-atmospheric fistula, recurrent AKIs, cholestatic transaminitis s/p perc cony, DVTs, PNAs, fungemia and recurrent bleeding from fistula recently s/p IR embolization of L branch of inferior epigastric artery (4/16), with multiple presentations afterwards for recurrent bleeding at fistula site requiring transfusions. Rapid response called overnight 5/8 for blood loss from fistula site with hypotension. Labs significant for ELVI on CKD and hyperkalemia. Physical exam as above.   1. Hemorrhagic shock  2. Anemia due to blood loss  3. ELVI on CKD   4. Hyperkalemia  5. Afib/flutter  6. Chron's flare?  7. Elevated WBC due to steroids?  - iv fluids  - transfuse PRBC  - cont methylprednisolone  - local control for bleeding from abdominal wall

## 2024-05-08 NOTE — CHART NOTE - NSCHARTNOTEFT_GEN_A_CORE
***Rapid Response Clinical Impact Advanced Care Provider Note***    Patient is a 79yo male w/PMH of AFib/Flutter 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 Prolonged hospital course significant for recurrent AKIs, cholestatic transaminitis s/p perc cony, prior DVTs, PNAs, fungemia, sinus pauses s/p pacemaker and recurrent bleeding from fistula recently s/p IR embolization of L branch of inferior epigastric artery via R groin access (4/16), with mutiple presentations afterwards for recurrent bleeding at fistula site requiring transfusions, admitted to surgical tele on 5/5 for symptomatic anemia 2/2 bleeding from fistula site.    Rapid response team called because pt put out ~2.3L of bloody output from fistula site within an hour.     Patient was seen and examined at the bedside by the rapid response team. Upon arrival to bedside, pt appears pale & lethargic, however, wakes to touch and able to follow commands. Initial VS: HR 90s, BP 80s/40s, O2 100% on bipap, RR 16, afebrile. PE notable for no focal deficits, abd w/midline fistula w/sanguinous output, stoma pink, skin pale and cool to touch, Given hypotension & large volume blood from fistula, code fusion called overhead. Surgery team at bedside holding pressure to site. Additional IV placed, stat labs sent, & IVF bolus initiated while awaiting blood product. Hemostatic dressing (QuikClot) applied to fistula site to control bleeding.     Review of Systems: Unable to obtain d/t pt lethargic & on bipap    Allergies  penicillin (Angioedema)  Intolerances    PAST MEDICAL & SURGICAL HISTORY:  Essential hypertension  Hypertension  Atrial fibrillation  s/p cardioversion 2010 and 2014  Pt. reports 4 DCCV  Now on Amiodarone 200mg PO bid and Eliquis 5mg PO bid  Last DCCV 4 yrs ago at [facility]  Crohn's disease  s/p partial resection of ileum  Hyperlipidemia  Hypothyroidism  History of depression  On Venlafaxine ER 150mg PO bid  Junctional rhythm  Bradycardia  H/O knee surgery  History of cataract surgery  S/P appendectomy      Vital Signs Last 24 Hrs  T(C): 36.1 (08 May 2024 22:09), Max: 36.4 (08 May 2024 09:43)  T(F): 96.9 (08 May 2024 22:09), Max: 97.6 (08 May 2024 09:43)  HR: 89 (08 May 2024 21:06) (84 - 96)  BP: 126/62 (08 May 2024 15:12) (108/53 - 131/58)  BP(mean): 89 (08 May 2024 15:12) (76 - 89)  RR: 18 (08 May 2024 21:06) (16 - 19)  SpO2: 99% (08 May 2024 15:12) (95% - 100%)    Parameters below as of 08 May 2024 21:06  Patient On (Oxygen Delivery Method): room air    GENERAL: The patient is awake and appears pale.  HEENT: Head is normocephalic and atraumatic. Extraocular muscles are intact. Mucous membranes are moist. No throat erythema/exudates no lymphadenopathy, no JVD,   NECK: Supple.  LUNGS: Clear to auscultation BL without wheezing, rales or rhonchi; respirations unlabored  HEART: Regular rate and rhythm ,+S1/+S2, no murmurs, rubs, gallops  ABDOMEN: Soft, NTND. Midline fistula pink with sanguinous output. Stoma is pink and patent.  EXTREMITIES: Mild dependent edema. Cool to touch. No cyanosis, clubbing, rash, lesions.  SKIN: Pallor & cool to touch. No new rashes or lesions.  MSK: strength equal BL  VASCULAR: Radial and Dorsal pedal pulses palpable BL  NEUROLOGIC: Grossly intact.  PSYCH: No new changes.    05-07 @ 07:01 - 05-08 @ 07:00  --------------------------------------------------------  IN: 2697.4 mL / OUT: 2750 mL / NET: -52.6 mL    05-08 @ 07:01  -  05-08 @ 23:24  --------------------------------------------------------  IN: 240 mL / OUT: 850 mL / NET: -610 mL                              5.0    23.69 )-----------( 101      ( 08 May 2024 22:54 )             14.4     05-08    133<L>  |  101  |  74<H>  ----------------------------<  287<H>  5.7<H>   |  21<L>  |  2.07<H>    Ca    7.1<L>      08 May 2024 22:54  Phos  5.4     05-08  Mg     2.6     05-08    TPro  4.0<L>  /  Alb  1.3<L>  /  TBili  5.2<H>  /  DBili  x   /  AST  315<H>  /  ALT  197<H>  /  AlkPhos  70  05-08         LIVER FUNCTIONS - ( 08 May 2024 22:54 )  Alb: 1.3 g/dL / Pro: 4.0 g/dL / ALK PHOS: 70 U/L / ALT: 197 U/L / AST: 315 U/L / GGT: x         Urinalysis Basic - ( 08 May 2024 22:54 )    Color: x / Appearance: x / SG: x / pH: x  Gluc: 287 mg/dL / Ketone: x  / Bili: x / Urobili: x   Blood: x / Protein: x / Nitrite: x   Leuk Esterase: x / RBC: x / WBC x   Sq Epi: x / Non Sq Epi: x / Bacteria: x      PT/INR - ( 08 May 2024 22:54 )   PT: 21.1 sec;   INR: 1.89     PTT - ( 08 May 2024 22:54 )  PTT:28.1 sec    MEDICATIONS  (STANDING):  atorvastatin 20 milliGRAM(s) Oral at bedtime  chlorhexidine 2% Cloths 1 Application(s) Topical daily  cholestyramine Powder (Sugar-Free) 4 Gram(s) Oral every 24 hours  HYDROmorphone  Injectable 0.25 milliGRAM(s) IV Push once  levothyroxine 50 MICROGram(s) Oral daily  lipid, fat emulsion (Fish Oil and Plant Based) 20% Infusion 0.5128 Gm/kG/Day (20.83 mL/Hr) IV Continuous <Continuous>  methylPREDNISolone sodium succinate Injectable 40 milliGRAM(s) IV Push every 8 hours  metoprolol tartrate 50 milliGRAM(s) Oral daily  pancrelipase  (CREON 24,000 Lipase Units) 1 Capsule(s) Oral three times a day with meals  Parenteral Nutrition - Adult 1 Each TPN Continuous <Continuous>  venlafaxine XR. 300 milliGRAM(s) Oral daily    MEDICATIONS  (PRN):  zolpidem 5 milliGRAM(s) Oral at bedtime PRN Insomnia      Assessment- Rapid Response called for 79yo male w/PMH of AFib/Flutter 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 Prolonged hospital course significant for recurrent AKIs, cholestatic transaminitis s/p perc cony, prior DVTs, PNAs, fungemia, sinus pauses s/p pacemaker and recurrent bleeding from fistula recently s/p IR embolization of L branch of inferior epigastric artery via R groin access (4/16), with mutiple presentations afterwards for recurrent bleeding at fistula site requiring transfusions, admitted to surgical tele on 5/5 for symptomatic anemia 2/2 bleeding from fistula site, now p/w re-bleeding from fistula site w/2.3L of blood loss over ~1 hour.  -s/p hemostatic dressing (QuikClot) to site  -initial CBC with H/H of 5 & 14  -code fusion called, administer at least 2 units PRBCs  -f/u stat labs sent (CBC, CMP, lactate, coags, mag, phos)  -Maintain active T&S & 2 large bore PIVs  -consider repeat imaging of abd vs scope  -if continued bleeding, consider IR  -rest of care per primary team  -dispo: SICU    Case discussed w/Dr. Chang    I have personally and independently provided 31 minutes of critical care services.  This excludes any time spent on separate procedures or teaching.

## 2024-05-08 NOTE — DISCHARGE NOTE PROVIDER - CARE PROVIDERS DIRECT ADDRESSES
,DirectAddress_Unknown,[email or web address],DirectAddress_Unknown

## 2024-05-08 NOTE — DISCHARGE NOTE PROVIDER - CARE PROVIDER_API CALL
Rikki Peterson  Colon/Rectal Surgery  [address]  [place] [address]  Phone: [phone]  Fax: [phone]  Follow Up Time: 1 month    Leighton Knapp  Surgery  [address]  [place] [address]  Phone: [phone]  Fax: [phone]  Follow Up Time: 1 month    Daniel Aguiar  Internal Medicine  [address] [address]  Phone: [phone]  Fax: [phone]  Follow Up Time: 1 month

## 2024-05-08 NOTE — DISCHARGE NOTE PROVIDER - NSDCMRMEDTOKEN_GEN_ALL_CORE_FT
allopurinol 300 mg oral tablet: 1 tab(s) orally once a day  fat emulsion with fish, medium chain, olive, and soy oil 20% intravenous emulsion: 50 gram(s) intravenous once a day  intravenous electrolyte (Lypholyte II/Nutrilyte II/TPN Electrolytes) solution: 1 each intravenous once a day  Lopressor 50 mg oral tablet: 1 tab(s) orally once a day  pancrelipase 24,000 units-76,000 units-120,000 units oral delayed release capsule: 1 cap(s) orally 3 times a day (with meals)  rosuvastatin 5 mg oral tablet: 1 tab(s) orally once a day  Synthroid 50 mcg (0.05 mg) oral tablet: 1 tab(s) orally once a day  venlafaxine 150 mg oral capsule, extended release: 2 cap(s) orally once a day  zolpidem 5 mg oral tablet: 1 tab(s) orally once a day (at bedtime) As needed Insomnia  zolpidem 5 mg oral tablet: 1 tab(s) orally once a day (at bedtime) As needed Insomnia   allopurinol 300 mg oral tablet: 1 tab(s) orally once a day  bacitracin 500 units/g topical ointment: 1 Apply topically to affected area 2 times a day  ceFAZolin 2 g intravenous injection: 2 gram(s) intravenous every 12 hours Please continue with 1 month course until 6/10 for MSSA bacteremia.  cholestyramine 4 g/9 g oral powder for reconstitution: 4,000 milligram(s) orally once a day Please give 4 grams of cholestyramine PO daily  fat emulsion with fish, medium chain, olive, and soy oil 20% intravenous emulsion: 50 gram(s) intravenous once a day  hydrocortisone 100 mg/60 mL rectal suspension: 60 milliliter(s) rectal every 12 hours  ipratropium-albuterol 0.5 mg-2.5 mg/3 mL inhalation solution: 3 milliliter(s) inhaled every 6 hours  levothyroxine 40 mcg (0.04 mg)/mL intravenous solution: 40 microgram(s) intravenous once a day  LORazepam 1 mg/mL-NaCl 0.9% intravenous solution: 0.25 milliliter(s) intravenous once a day As needed Anxiety  melatonin 5 mg oral tablet: 1 tab(s) orally once a day (at bedtime)  nystatin 100,000 units/g topical powder: 1 Apply topically to affected area  ondansetron 2 mg/mL injectable solution: 4 milligram(s) injectable 4 times a day as needed for  nausea  pancrelipase 24,000 units-76,000 units-120,000 units oral delayed release capsule: 1 cap(s) orally 3 times a day (with meals)  rosuvastatin 5 mg oral tablet: 1 tab(s) orally once a day  ursodiol 300 mg oral capsule: 1 cap(s) orally every 8 hours  venlafaxine 150 mg oral capsule, extended release: 2 cap(s) orally once a day

## 2024-05-08 NOTE — PROGRESS NOTE ADULT - SUBJECTIVE AND OBJECTIVE BOX
Pt seen and examined   dressings on  he is on CPAP and i tried to wake him up but sleeping    REVIEW OF SYSTEMS:  deferred      MEDICATIONS:  MEDICATIONS  (STANDING):  atorvastatin 20 milliGRAM(s) Oral at bedtime  chlorhexidine 2% Cloths 1 Application(s) Topical daily  cholestyramine Powder (Sugar-Free) 4 Gram(s) Oral every 24 hours  levothyroxine 50 MICROGram(s) Oral daily  lipid, fat emulsion (Fish Oil and Plant Based) 20% Infusion 0.5128 Gm/kG/Day (20.83 mL/Hr) IV Continuous <Continuous>  metoprolol tartrate 50 milliGRAM(s) Oral daily  pancrelipase  (CREON 24,000 Lipase Units) 1 Capsule(s) Oral three times a day with meals  Parenteral Nutrition - Adult 1 Each TPN Continuous <Continuous>  Parenteral Nutrition - Adult 1 Each TPN Continuous <Continuous>  venlafaxine XR. 300 milliGRAM(s) Oral daily    MEDICATIONS  (PRN):  zolpidem 5 milliGRAM(s) Oral at bedtime PRN Insomnia      Allergies    penicillin (Angioedema)    Intolerances        Vital Signs Last 24 Hrs  T(C): 36.4 (08 May 2024 09:43), Max: 36.4 (08 May 2024 09:43)  T(F): 97.6 (08 May 2024 09:43), Max: 97.6 (08 May 2024 09:43)  HR: 84 (08 May 2024 11:26) (84 - 96)  BP: 125/59 (08 May 2024 11:26) (107/55 - 131/58)  BP(mean): 85 (08 May 2024 11:26) (72 - 87)  RR: 16 (08 May 2024 11:26) (16 - 19)  SpO2: 98% (08 May 2024 11:26) (93% - 100%)    Parameters below as of 08 May 2024 11:26  Patient On (Oxygen Delivery Method): room air        05-07 @ 07:01  -  05-08 @ 07:00  --------------------------------------------------------  IN: 2697.4 mL / OUT: 2750 mL / NET: -52.6 mL    05-08 @ 07:01  -  05-08 @ 13:54  --------------------------------------------------------  IN: 240 mL / OUT: 250 mL / NET: -10 mL        PHYSICAL EXAM:    General: ; in no acute distress  HEENT: MMM, conjunctiva and sclera clear  Gastrointestinal: Soft non-tender non-distended; Normal bowel sounds; packing on  Skin: Warm and dry. No obvious rash    LABS:      CBC Full  -  ( 08 May 2024 05:15 )  WBC Count : 26.76 K/uL  RBC Count : 2.59 M/uL  Hemoglobin : 7.6 g/dL  Hematocrit : 23.3 %  Platelet Count - Automated : 135 K/uL  Mean Cell Volume : 90.0 fl  Mean Cell Hemoglobin : 29.3 pg  Mean Cell Hemoglobin Concentration : 32.6 gm/dL  Auto Neutrophil # : x  Auto Lymphocyte # : x  Auto Monocyte # : x  Auto Eosinophil # : x  Auto Basophil # : x  Auto Neutrophil % : x  Auto Lymphocyte % : x  Auto Monocyte % : x  Auto Eosinophil % : x  Auto Basophil % : x    05-08    135  |  102  |  64<H>  ----------------------------<  209<H>  5.2   |  25  |  1.73<H>    Ca    7.4<L>      08 May 2024 05:15  Phos  3.9     05-08  Mg     2.7     05-08    TPro  5.4<L>  /  Alb  1.7<L>  /  TBili  6.8<H>  /  DBili  x   /  AST  423<H>  /  ALT  227<H>  /  AlkPhos  84  05-08    PT/INR - ( 07 May 2024 05:30 )   PT: 15.3 sec;   INR: 1.35          PTT - ( 07 May 2024 05:30 )  PTT:32.0 sec      Urinalysis Basic - ( 08 May 2024 05:15 )    Color: x / Appearance: x / SG: x / pH: x  Gluc: 209 mg/dL / Ketone: x  / Bili: x / Urobili: x   Blood: x / Protein: x / Nitrite: x   Leuk Esterase: x / RBC: x / WBC x   Sq Epi: x / Non Sq Epi: x / Bacteria: x                RADIOLOGY & ADDITIONAL STUDIES (The following images were personally reviewed):

## 2024-05-08 NOTE — PROGRESS NOTE ADULT - SUBJECTIVE AND OBJECTIVE BOX
controlled a skin bleeder this a.m.  Awaiting GI procedure for what is believed to be bleeding from the excluded terminal ileal limb  Vital signs stable  LFTs, BUN/Cr increased

## 2024-05-08 NOTE — CONSULT NOTE ADULT - ASSESSMENT
Neuro:  Cardiovascular:   Pulm:  GI:  :  ID:  Heme: Anemia 2/2 fistu;a bleed. 2L noted out overnight. S/p 2 units PRBCs Hgb:   PPx: SCDs  Dispo: SICU  78 year old male with past medical history of AFib/Flutter 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 Prolonged hospital course significant for recurrent AKIs, cholestatic transaminitis s/p perc cony, prior DVTs, PNAs, fungemia, sinus pauses s/p pacemaker and recurrent bleeding from fistula most recently s/p IR embolization of L branch of inferior epigastric artery via R groin access (4/16), with multiple readmissions for continued bleeding from fistula and ostomy sites. Admitted for symptomatic anemia secondary to bleeding from fistula site and ileostomy sites. Transferred to SICU 5/8 overnight for 2L blood loss from fistula site with subsequent hypotension.       Neuro: Dilaudid prn for pain. Hx: Anxiety: Continue Effexor. Ambien on hold   Cardiovascular: Hemorrhagic shock 2/2 EC Fistula bleed. 2L of bright red blood. S/p 2 units of PRBCs (overnight 5/8). Keep MAPS greater than 65. Hx: AF: Metoprolol on hold 2/2 hypotension HLD: Continue Atorvastatin   Pulm: Sating well of BIPAP at 40%   GI: NPO for endoscopy 5/9. Continue TPN.    : Voids. ELVI likley from acute blood loss. Creatinine 2.07. Baseline 1.2 - 1.4. Urine Studies/Cystatin C pending.   ID:  Endo: Elvia. Hx: Hypothyroidism. Continue synthroid.   Heme: Anemia 2/2 EC fistula bleed. Received a total of 4 units of PRBCs. Trend CBC [id number]   PPx: SCDs  Dispo: SICU  78 year old male with past medical history of AFib/Flutter 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 Prolonged hospital course significant for recurrent AKIs, cholestatic transaminitis s/p perc cony, prior DVTs, PNAs, fungemia, sinus pauses s/p pacemaker and recurrent bleeding from fistula most recently s/p IR embolization of L branch of inferior epigastric artery via R groin access (4/16), with multiple readmissions for continued bleeding from fistula and ostomy sites. Admitted for symptomatic anemia secondary to bleeding from fistula site and ileostomy sites. Transferred to SICU 5/8 overnight for 2L blood loss from fistula site with subsequent hypotension.       Neuro: Dilaudid prn for pain. Hx: Anxiety: Continue Effexor. Ambien on hold   Cardiovascular: Hemorrhagic shock 2/2 EC Fistula bleed. 2L of bright red blood. S/p 2 units of PRBCs (overnight 5/8). Keep MAPS greater than 65. Hx: AF: Metoprolol on hold 2/2 hypotension HLD: Continue Atorvastatin   Pulm: Sating well of BIPAP at 40%   GI: NPO for endoscopy 5/9. CT 5/2 with bowel wall thickening. Continue methylprednisolone per GI. Continue TPN.    : Voids. ELVI likely from acute blood loss. Creatinine 2.07. Baseline 1.2 - 1.4. Urine Studies/Cystatin C pending.   ID: Persistent leukocytosis with obvious source of infection.    Endo: mISSc. Hx: Hypothyroidism. Continue synthroid.   Heme: Anemia 2/2 EC fistula bleed. Received a total of 4 units of PRBCs. Trend CBC [id number]   PPx: SCDs  Dispo: SICU  78 year old male with past medical history of AFib/Flutter 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 Prolonged hospital course significant for recurrent AKIs, cholestatic transaminitis s/p perc cony, prior DVTs, PNAs, fungemia, sinus pauses s/p pacemaker and recurrent bleeding from fistula most recently s/p IR embolization of L branch of inferior epigastric artery via R groin access (4/16), with multiple readmissions for continued bleeding from fistula and ostomy sites. Admitted for symptomatic anemia secondary to bleeding from fistula site and ileostomy sites. Transferred to SICU 5/8 overnight for 2L blood loss from fistula site with subsequent hypotension.       Neuro: Dilaudid prn for pain. Hx: Anxiety: Continue Effexor. Ambien on hold   Cardiovascular: Hemorrhagic shock 2/2 EC Fistula bleed. 2L of bright red blood. S/p 2 units of PRBCs (overnight 5/8). Keep MAPS greater than 65. Lactate 6.1 -->4.0 Hx: AF: Metoprolol on hold 2/2 hypotension HLD: Continue Atorvastatin   Pulm: Sating well of BIPAP at 40%   GI: NPO for endoscopy 5/9. CT 5/2 with bowel wall thickening. Continue methylprednisolone per GI. Continue TPN.    : Voids. ELVI likely from acute blood loss. Creatinine 2.07. Baseline 1.2 - 1.4. Urine Studies/Cystatin C pending.   ID: Persistent leukocytosis without obvious source of infection. Currently on steroids. BC sent.   Endo: mISSc. Hx: Hypothyroidism. Continue synthroid.   Heme: Anemia 2/2 EC fistula bleed. Received a total of 6 units of PRBCs this admission. Trend CBC [id number]   PPx: SCDs  Dispo: SICU

## 2024-05-08 NOTE — CONSULT NOTE ADULT - SUBJECTIVE AND OBJECTIVE BOX
HPI:  78M well known to surgical service with PMHx of AFib/Flutter 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 Prolonged hospital course significant for recurrent AKIs, cholestatic transaminitis s/p perc cony, prior DVTs, PNAs, fungemia, sinus pauses s/p pacemaker and recurrent bleeding from fistula recently s/p IR embolization of L branch of inferior epigastric artery via R groin access (4/16), with mutiple presentations afterwards for recurrent bleeding at fistula site requiring transfusions.     Patient was discharged 2 days ago and was doing well at home until last night when he began having difficult to control bleeding around ostomy site with associated hypotension and tachycardia. Bleeding eventually resloved but patient reports feeling slightly lightheaded and estimates that he lost about 2 units of blood.    In the ED   Initial vitals: T 98.7, , /61, RR 17, SpO2 97% RA   Labs: WBC 14.43, Hgb 6.6, Plt 153, Na 136, K 3.4, Cl 95, CO2 33, BUN 45, Cr 1.77, Ca 7.7, , , ALT 39   Interventions: 1u pRBC  (05 May 2024 03:47)      PAST MEDICAL & SURGICAL HISTORY:  Essential hypertension  Hypertension      Atrial fibrillation  s/p cardioversion 2010 and 2014  Pt. reports 4 DCCV  Now on Amiodarone 200mg PO bid and Eliquis 5mg PO bid  Last DCCV 4 yrs ago at [facility]      Crohn's disease  s/p partial resection of ileum      Hyperlipidemia      Hypothyroidism      History of depression  On Venlafaxine ER 150mg PO bid      Junctional rhythm      Bradycardia      H/O knee surgery      History of cataract surgery      S/P appendectomy          ROS: See HPI    MEDICATIONS  (STANDING):  atorvastatin 20 milliGRAM(s) Oral at bedtime  chlorhexidine 2% Cloths 1 Application(s) Topical daily  cholestyramine Powder (Sugar-Free) 4 Gram(s) Oral every 24 hours  HYDROmorphone  Injectable 0.25 milliGRAM(s) IV Push once  levothyroxine 50 MICROGram(s) Oral daily  lipid, fat emulsion (Fish Oil and Plant Based) 20% Infusion 0.5128 Gm/kG/Day (20.83 mL/Hr) IV Continuous <Continuous>  methylPREDNISolone sodium succinate Injectable 40 milliGRAM(s) IV Push every 8 hours  metoprolol tartrate 50 milliGRAM(s) Oral daily  pancrelipase  (CREON 24,000 Lipase Units) 1 Capsule(s) Oral three times a day with meals  Parenteral Nutrition - Adult 1 Each TPN Continuous <Continuous>  venlafaxine XR. 300 milliGRAM(s) Oral daily    MEDICATIONS  (PRN):  zolpidem 5 milliGRAM(s) Oral at bedtime PRN Insomnia      Allergies    penicillin (Angioedema)    Intolerances        SOCIAL HISTORY:  Smoke: Never Smoker  EtOH: occasional    FAMILY HISTORY:      Vital Signs Last 24 Hrs  T(C): 36.1 (08 May 2024 22:09), Max: 36.4 (08 May 2024 09:43)  T(F): 96.9 (08 May 2024 22:09), Max: 97.6 (08 May 2024 09:43)  HR: 89 (08 May 2024 21:06) (84 - 96)  BP: 126/62 (08 May 2024 15:12) (108/53 - 129/60)  BP(mean): 89 (08 May 2024 15:12) (76 - 89)  RR: 18 (08 May 2024 21:06) (16 - 18)  SpO2: 99% (08 May 2024 15:12) (96% - 100%)    Parameters below as of 08 May 2024 21:06  Patient On (Oxygen Delivery Method): room air        PHYSICAL EXAM    Gen: NAD   Neuro: A&oX3 no neuro deficits  HEENT:   CV: RRR reg s1s2 no M  Pulm: CTA B/L no w/w/r  Abd: Soft, NT/ND  Ext: No C/C/E  Skin: No rashes erythema or ecchymosis  MSK: No joint swelling noted  Psych: Normal affect     LABS:                        5.0    23.69 )-----------( 101      ( 08 May 2024 22:54 )             14.4     05-08    133<L>  |  101  |  74<H>  ----------------------------<  287<H>  5.7<H>   |  21<L>  |  2.07<H>    Ca    7.1<L>      08 May 2024 22:54  Phos  5.4     05-08  Mg     2.6     05-08    TPro  4.0<L>  /  Alb  1.3<L>  /  TBili  5.2<H>  /  DBili  x   /  AST  315<H>  /  ALT  197<H>  /  AlkPhos  70  05-08    PT/INR - ( 08 May 2024 22:54 )   PT: 21.1 sec;   INR: 1.89          PTT - ( 08 May 2024 22:54 )  PTT:28.1 sec  Urinalysis Basic - ( 08 May 2024 22:54 )    Color: x / Appearance: x / SG: x / pH: x  Gluc: 287 mg/dL / Ketone: x  / Bili: x / Urobili: x   Blood: x / Protein: x / Nitrite: x   Leuk Esterase: x / RBC: x / WBC x   Sq Epi: x / Non Sq Epi: x / Bacteria: x        RADIOLOGY & ADDITIONAL STUDIES:    [facility] 78 year [facility] male with past medical history of AFib/Flutter 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 Prolonged hospital course significant for recurrent AKIs, cholestatic transaminitis s/p perc cony, prior DVTs, PNAs, fungemia, sinus pauses s/p pacemaker and recurrent bleeding from fistula recently s/p IR embolization of L branch of inferior epigastric artery (4/16), with multiple presentations afterwards for recurrent bleeding at fistula site requiring transfusions.     Patient was discharged 2 days ago and was doing well at home until last night when he began having difficult to control bleeding around ostomy site with associated hypotension and tachycardia. Bleeding eventually resolved but patient reports feeling slightly lightheaded and estimates that he lost about 2 units of blood. Admitted to tele where he received a total of 4 units PRBCs since 5/5.     Rapid response called overnight 5/8 for 2L blood loss from fistula site with subsequent hypotension. SBP in the 80's, MAPs in the 50's, HR 90's to low 100's. STAT labs sent hgb 5.0 from 7.6. Lactate 6.1. 1L LR and 2 units PRBCs given. Patient transferred to SICU for hemodynamic monitoring.       PAST MEDICAL & SURGICAL HISTORY:  Essential hypertension  Hypertension      Atrial fibrillation  s/p cardioversion 2010 and 2014  Pt. reports 4 DCCV  Now on Amiodarone 200mg PO bid and Eliquis 5mg PO bid  Last DCCV [facility] yrs ago at [facility]      Crohn's disease  s/p partial resection of ileum      Hyperlipidemia      Hypothyroidism      History of depression  On Venlafaxine ER 150mg PO bid      Junctional rhythm      Bradycardia      H/O knee surgery      History of cataract surgery      S/P appendectomy          ROS: Endorses fatigue, ROS otherwise negative     MEDICATIONS  (STANDING):  atorvastatin 20 milliGRAM(s) Oral at bedtime  chlorhexidine 2% Cloths 1 Application(s) Topical daily  cholestyramine Powder (Sugar-Free) 4 Gram(s) Oral every 24 hours  HYDROmorphone  Injectable 0.25 milliGRAM(s) IV Push once  levothyroxine 50 MICROGram(s) Oral daily  lipid, fat emulsion (Fish Oil and Plant Based) 20% Infusion 0.5128 Gm/kG/Day (20.83 mL/Hr) IV Continuous <Continuous>  methylPREDNISolone sodium succinate Injectable 40 milliGRAM(s) IV Push every 8 hours  metoprolol tartrate 50 milliGRAM(s) Oral daily  pancrelipase  (CREON 24,000 Lipase Units) 1 Capsule(s) Oral three times a day with meals  Parenteral Nutrition - Adult 1 Each TPN Continuous <Continuous>  venlafaxine XR. 300 milliGRAM(s) Oral daily    MEDICATIONS  (PRN):  zolpidem 5 milliGRAM(s) Oral at bedtime PRN Insomnia      Allergies    penicillin (Angioedema)    Intolerances        SOCIAL HISTORY:  Smoke: Never Smoker  EtOH: occasional    FAMILY HISTORY:      Vital Signs Last 24 Hrs  T(C): 36.1 (08 May 2024 22:09), Max: 36.4 (08 May 2024 09:43)  T(F): 96.9 (08 May 2024 22:09), Max: 97.6 (08 May 2024 09:43)  HR: 89 (08 May 2024 21:06) (84 - 96)  BP: 126/62 (08 May 2024 15:12) (108/53 - 129/60)  BP(mean): 89 (08 May 2024 15:12) (76 - 89)  RR: 18 (08 May 2024 21:06) (16 - 18)  SpO2: 99% (08 May 2024 15:12) (96% - 100%)    Parameters below as of 08 May 2024 21:06  Patient On (Oxygen Delivery Method): room air        PHYSICAL EXAM    Gen: Pale, not in distress   Neuro: Lethargic, but able to aroused, oriented x3. No focal defecits   CV: Tachycardic, no murmurs, +1 bilateral lower extremity edema   Pulm: Lung sounds clear bilaterally   Abd: Soft, non-distended, bright red blood coming from EC Fistula site, no output appreciated from the ostomy site   Ext: Cool to touch bilateral upper and lower extremities   Skin: EC Fistula site with bright red blood, No rashes erythema or ecchymosis  MSK: No joint swelling noted  Psych: Normal affect     LABS:                        5.0    23.69 )-----------( 101      ( 08 May 2024 22:54 )             14.4     05-08    133<L>  |  101  |  74<H>  ----------------------------<  287<H>  5.7<H>   |  21<L>  |  2.07<H>    Ca    7.1<L>      08 May 2024 22:54  Phos  5.4     05-08  Mg     2.6     05-08    TPro  4.0<L>  /  Alb  1.3<L>  /  TBili  5.2<H>  /  DBili  x   /  AST  315<H>  /  ALT  197<H>  /  AlkPhos  70  05-08    PT/INR - ( 08 May 2024 22:54 )   PT: 21.1 sec;   INR: 1.89       Lactate 6.1

## 2024-05-09 NOTE — PROVIDER CONTACT NOTE (OTHER) - ASSESSMENT
Pt alert and oriented x4, in bed resting, denies any pain or sob or lightheadedness.
Pt asymptomatic, lipid emulsions ordered to be run at 20.8cc/hr and TPN ordered to run at 196cc/hr. TPN was infusing at 20.8cc/hr and lipid emulsions were being infused at 196cc/hr, had completed running and bag was empty .
600cc suctioned from fistula site at shift change, another 600cc suctioned around 2145 when pt was transferred from chair to bed, pt c/o nausea, SOB

## 2024-05-09 NOTE — DIETITIAN INITIAL EVALUATION ADULT - NSICDXPASTMEDICALHX_GEN_ALL_CORE_FT
PAST MEDICAL HISTORY:  Atrial fibrillation s/p cardioversion 2010 and 2014  Pt. reports 4 DCCV  Now on Amiodarone 200mg PO bid and Eliquis 5mg PO bid  Last DCCV 4 yrs ago at [facility]    Bradycardia     Crohn's disease s/p partial resection of ileum    Essential hypertension Hypertension    History of depression On Venlafaxine ER 150mg PO bid    Hyperlipidemia     Hypothyroidism     Junctional rhythm

## 2024-05-09 NOTE — DIETITIAN INITIAL EVALUATION ADULT - NSFNSGIIOFT_GEN_A_CORE
05-08-24 @ 07:01  -  05-09-24 @ 07:00  --------------------------------------------------------  OUT:    Ileostomy (mL): 25 mL  Total OUT: 25 mL    Total NET: 975.8 mL      05-09-24 @ 07:01  -  05-09-24 @ 11:03  --------------------------------------------------------  OUT:  Total OUT: 0 mL    Total NET: 588 mL

## 2024-05-09 NOTE — DIETITIAN INITIAL EVALUATION ADULT - PERTINENT MEDS FT
MEDICATIONS  (STANDING):  albuterol/ipratropium for Nebulization 3 milliLiter(s) Nebulizer every 6 hours  atorvastatin 20 milliGRAM(s) Oral at bedtime  chlorhexidine 2% Cloths 1 Application(s) Topical daily  cholestyramine Powder (Sugar-Free) 4 Gram(s) Oral every 24 hours  dextrose 10% Bolus 125 milliLiter(s) IV Bolus once  dextrose 5%. 1000 milliLiter(s) (50 mL/Hr) IV Continuous <Continuous>  dextrose 5%. 1000 milliLiter(s) (100 mL/Hr) IV Continuous <Continuous>  dextrose 50% Injectable 25 Gram(s) IV Push once  dextrose 50% Injectable 12.5 Gram(s) IV Push once  glucagon  Injectable 1 milliGRAM(s) IntraMuscular once  insulin lispro (ADMELOG) corrective regimen sliding scale   SubCutaneous every 6 hours  levothyroxine Injectable 40 MICROGram(s) IV Push <User Schedule>  lipid, fat emulsion (Fish Oil and Plant Based) 20% Infusion 0.5128 Gm/kG/Day (20.83 mL/Hr) IV Continuous <Continuous>  lipid, fat emulsion (Fish Oil and Plant Based) 20% Infusion 0.51 Gm/kG/Day (20.83 mL/Hr) IV Continuous <Continuous>  methylPREDNISolone sodium succinate Injectable 40 milliGRAM(s) IV Push every 8 hours  pancrelipase  (CREON 24,000 Lipase Units) 1 Capsule(s) Oral three times a day with meals  Parenteral Nutrition - Adult 1 Each TPN Continuous <Continuous>  Parenteral Nutrition - Adult 1 Each TPN Continuous <Continuous>  venlafaxine XR. 300 milliGRAM(s) Oral daily    MEDICATIONS  (PRN):  dextrose Oral Gel 15 Gram(s) Oral once PRN Blood Glucose LESS THAN 70 milliGRAM(s)/deciliter

## 2024-05-09 NOTE — DIETITIAN INITIAL EVALUATION ADULT - OTHER CALCULATIONS
Ideal body weight (83.4kg) used for calculations as pt >100% IBW and BMI <30 per [facility] Standards of Care. Needs estimated for age and adjusted for current clinical status, increased needs for wound healing, high outputs. Fluid needs per team.

## 2024-05-09 NOTE — PROGRESS NOTE ADULT - SUBJECTIVE AND OBJECTIVE BOX
S: Describes some nausea. States overnight during rapid response he was short of breath, but this has improved.     O: 110/50, HR 80-90s, 100% on bipap overnight. Now on room air w/ sats in 90s     Gen: Pale, not in distress   Neuro: oriented x3. No focal defecits   CV: Tachycardic in low 100s, no murmurs, +1 bilateral lower extremity edema, face somewhat edematous  Pulm: No respiratory distress  Abd: Soft, non-distended, clot in ileostomy oriface, fistula bag w/ thin brown fluid, no blood.   Ext: Warm and perfused  Skin: extensive appliance on abdomen. No rashes erythema or ecchymosis  MSK: No joint swelling noted  Psych: Normal affect, appears anxious    i/o  1 L LR bolus  1L TPN  1 u PRBC    + 1  Fistula 2400/2900  Net even / - 600      LABS:  cret                        8.3    20.08 )-----------( 62       ( 09 May 2024 05:30 )             24.2     05-09    137  |  108  |  76<H>  ----------------------------<  244<H>  5.6<H>   |  22  |  1.92<H>    Ca    7.0<L>      09 May 2024 05:30  Phos  4.4     05-09  Mg     2.6     05-09    TPro  3.7<L>  /  Alb  1.4<L>  /  TBili  5.5<H>  /  DBili  4.1<H>  /  AST  330<H>  /  ALT  222<H>  /  AlkPhos  68  05-09    PT/INR - ( 09 May 2024 05:30 )   PT: 22.6 sec;   INR: 2.02          PTT - ( 09 May 2024 05:30 )  PTT:40.5 sec    Lactate 6 @ MN -> 4 @ 0100. AM lactate pending.     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  Admitted for symptomatic issue -- recent primary concern has been recurrent bleeding requiring frequent transfusion and repeated admissions from granulation tissue surrounding fistula orifice as well as small bowel ulcer S/p IR embolization of L branch of inferior epigastric artery (4/16) and s/p scope & clip. Transferred to SICU 5/8 overnight for 2L blood loss from fistula site with subsequent hypotension, currently appearing appropriately resuscitated this morning.     - Care per SICU  - Scope today    S: Describes some nausea. States overnight during rapid response he was short of breath, but this has improved.     O: 110/50, HR 80-90s, 100% on bipap overnight. Now on room air w/ sats in 90s     Gen: Pale, not in distress   Neuro: oriented x3. No focal defecits   CV: Tachycardic in low 100s, no murmurs, +1 bilateral lower extremity edema, face somewhat edematous  Pulm: No respiratory distress  Abd: Soft, non-distended, clot in ileostomy oriface, fistula bag w/ thin brown fluid, no blood.   Ext: Warm and perfused  Skin: extensive appliance on abdomen. No rashes erythema or ecchymosis  MSK: No joint swelling noted  Psych: Normal affect, appears anxious    i/o  1 L LR bolus  1L TPN  1 u PRBC    + 1  Fistula 2400/2900  Net even / - 600      LABS:  cret                        8.3    20.08 )-----------( 62       ( 09 May 2024 05:30 )             24.2     05-09    137  |  108  |  76<H>  ----------------------------<  244<H>  5.6<H>   |  22  |  1.92<H>    Ca    7.0<L>      09 May 2024 05:30  Phos  4.4     05-09  Mg     2.6     05-09    TPro  3.7<L>  /  Alb  1.4<L>  /  TBili  5.5<H>  /  DBili  4.1<H>  /  AST  330<H>  /  ALT  222<H>  /  AlkPhos  68  05-09    PT/INR - ( 09 May 2024 05:30 )   PT: 22.6 sec;   INR: 2.02          PTT - ( 09 May 2024 05:30 )  PTT:40.5 sec    Lactate 6 @ MN -> 4 @ 0100. AM lactate pending.     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  Admitted for symptomatic issue -- recent primary concern has been recurrent bleeding requiring frequent transfusion and repeated admissions from granulation tissue surrounding fistula orifice as well as small bowel ulcer S/p IR embolization of L branch of inferior epigastric artery (4/16) and s/p scope & clip. Transferred to SICU 5/8 overnight for 2L blood loss from fistula site with subsequent hypotension, currently appearing appropriately resuscitated this morning.     - Care per SICU  - Scope today   - Continue steroids for Chron' s flare

## 2024-05-09 NOTE — PROVIDER CONTACT NOTE (OTHER) - REASON
SBP 95
Unable to Draw Labs
Medication error by previous RN
Pt excessively bleeding from abdominal fistula
Bleeding From Fistula

## 2024-05-09 NOTE — PROVIDER CONTACT NOTE (OTHER) - SITUATION
Pt has lost 1200cc of sanguinous output from abdominal fistula between 2000-2200.
Sanguinous output from fistula to conrado's pouch. HR 80s AF, /66 RR 20 SpO2 98% RA.
Unable to draw labs, patient difficult stickj
Blood pressure at change of shift 95/52
Day RN infused TPN at lipids rate (20.8cc/hr) and infused lipids at TPN rate (196cc/hr)

## 2024-05-09 NOTE — PROVIDER CONTACT NOTE (OTHER) - BACKGROUND
Pt has extensive surgical and medical history, readmitted on 5/5 for excessive bleeding from abdominal fistula
Bleeding from conrdao's pouch overnight, code fusion, s/p 3 units pRBS, 1 FFP, 1 platelet
Admitted with anemia/gi bleed, hx of htn afib crohns bradycardia.
Pt has extensive medical/surgical history, recently admitted due to excessive bleeding from abdominal fistula

## 2024-05-09 NOTE — DIETITIAN INITIAL EVALUATION ADULT - NS FNS DIET ORDER
Diet, NPO (05-08-24 @ 23:43)  Diet, NPO after Midnight:      NPO Start Date: 08-May-2024,   NPO Start Time: 23:59 (05-08-24 @ 11:15)

## 2024-05-09 NOTE — PROGRESS NOTE ADULT - ASSESSMENT
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 Prolonged hospital course significant for recurrent AKIs, cholestatic transaminitis s/p perc cony, prior DVTs, PNAs, fungemia, sinus pauses s/p pacemaker and recurrent bleeding from fistula most recently s/p IR embolization of L branch of inferior epigastric artery via R groin access (4/16), with multiple readmissions for continued bleeding from fistula and ostomy sites. S/p ileoscopy on noting an ulcer w/ clot proximal to stoma w/ hemoclip was applied (5/1). Admitted for symptomatic anemia secondary to bleeding from fistula site and ileostomy sites. Transferred to SICU 5/8 overnight for 2L blood loss from fistula site with subsequent hypotension. Now s/p ileoscopy, noting erythematous patch of bowel just deep to the ECF, without any active bleeding (5/9).     Neuro: Dilaudid prn for pain. Hx: Anxiety: Continue Effexor. Ambien on hold   CV: Hemorrhagic shock on 5/8, 2/2 EC Fistula bleed, now resolved. 2L of bright red blood. S/p total of 3 units of PRBCs (overnight 5/8), responded appropriately 8.3 (5). Keep MAPs > 65. Lactate 6.1 -->4.0 ---> 2.8. Hx: AF: Metoprolol on hold 2/2 hypotension HLD: Continue Atorvastatin   Pulm: SatTing well on RA, nightly BIPAP at 40%.   GI: NPO. endoscopy 5/9. CT 5/2 with bowel wall thickening. Continue methylprednisolone per GI. Continue TPN.    : Voids. ELVI likely from acute blood loss. Creatinine 2.07. Baseline 1.2 - 1.4. Urine Studies/Cystatin C pending.   ID: Persistent leukocytosis without obvious source of infection. Currently on steroids. BC sent.   Endo: mISSc. Hx: Hypothyroidism. Continue synthroid.   Heme: Anemia 2/2 EC fistula bleed. Received a total of 7 units of PRBCs, 1 FFP, 1 plt this admission. Trend CBC [id number]   PPx: SCDs  Dispo: SICU

## 2024-05-09 NOTE — PRE-ANESTHESIA EVALUATION ADULT - NSANTHPMHFT_GEN_ALL_CORE
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 Prolonged hospital course significant for recurrent AKIs, cholestatic transaminitis s/p perc cony, prior DVTs, PNAs, fungemia, sinus pauses s/p pacemaker and recurrent bleeding from fistula most recently s/p IR embolization of L branch of inferior epigastric artery via R groin access (4/16), with multiple readmissions for continued bleeding from fistula and ostomy sites. S/p ileoscopy on noting an ulcer w/ clot proximal to stoma w/ hemoclip was applied (5/1). Admitted for symptomatic anemia secondary to bleeding from fistula site and ileostomy sites. Transferred to SICU 5/8 overnight for 2L blood loss from fistula site with subsequent hypotension. Now s/p ileoscopy, noting erythematous patch of bowel just deep to the ECF, without any active bleeding (5/9).     Neuro: Dilaudid prn for pain. Hx: Anxiety: Continue Effexor. Ambien on hold   CV: Hemorrhagic shock on 5/8, 2/2 EC Fistula bleed, now resolved. 2L of bright red blood. S/p total of 3 units of PRBCs (overnight 5/8), responded appropriately 8.3 (5). Keep MAPs > 65. Lactate 6.1 -->4.0 ---> 2.8. Hx: AF: Metoprolol on hold 2/2 hypotension HLD: Continue Atorvastatin   Pulm: SatTing well on RA, nightly BIPAP at 40%.   GI: NPO. endoscopy 5/9. CT 5/2 with bowel wall thickening. Continue methylprednisolone per GI. Continue TPN.    : Voids. ELVI likely from acute blood loss. Creatinine 2.07. Baseline 1.2 - 1.4. Urine Studies/Cystatin C pending.   ID: Persistent leukocytosis without obvious source of infection. Currently on steroids. BC sent.   Endo: mISSc. Hx: Hypothyroidism. Continue synthroid.   Heme: Anemia 2/2 EC fistula bleed. Received a total of 7 units of PRBCs, 1 FFP, 1 plt this admission. Trend CBC [id number]   PPx: SCDs  Dispo: SICU

## 2024-05-09 NOTE — PRE-ANESTHESIA EVALUATION ADULT - LAST ECHOCARDIOGRAM
Discussed with patient that as soon as we have the new authorization we will know what pharmacy we can send the medication to and we will send it over at that time. Let her know I would call as soon as I heard something. She verbalized an understanding.   4/2/2024

## 2024-05-09 NOTE — PROVIDER CONTACT NOTE (OTHER) - ACTION/TREATMENT ORDERED:
Sara RUIZ aware, nothing to be done at moment since lipids already completed infusing, will continue to monitor
Another 1200cc suctioned from fistula site, RRT initiated, stat labs drawn, NS bolus given, 2U PBRCs ordered and given, QuikClot added to fistula site, placed on BiPAP, pt upgraded to 5E CCU RN Max
SICU team at bedside to assess patient, conrado's pouch suctioned (450ml output) and bleed visualized, patient to go to IR urgently
JOSEPH Becerra to draw labs with ultrasound
Notify provider if BP goes less than 95 systolic. Provider is ok with current BP.

## 2024-05-09 NOTE — PRE-ANESTHESIA EVALUATION ADULT - NSANTHPEFT_GEN_ALL_CORE
General: AAOx3, NAD  Eyes: The sclera and conjunctiva normal, pupils equal in size.  ENT: The ears and nose were normal in appearance; oropharynx clear, moist mucus membranes.  Neck: The appearance of the neck was normal, with no gross masses or nodules.  Respiratory: Unlabored, no retractions.  CV: RRR  Neurological: No focal deficit, moves all extremities.

## 2024-05-09 NOTE — DIETITIAN INITIAL EVALUATION ADULT - OTHER INFO
Pain:  Skin: 1+ BLE edema  GI: 78 year old male with past medical history of AFib/Flutter 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 Prolonged hospital course significant for recurrent AKIs, cholestatic transaminitis s/p perc cony, prior DVTs, PNAs, fungemia, sinus pauses s/p pacemaker and recurrent bleeding from fistula recently s/p IR embolization of L branch of inferior epigastric artery (4/16), with multiple presentations afterwards for recurrent bleeding at fistula site requiring transfusions.     Chart reviewed. Pt well known to nutrition services. Discussed with IDT today during AM rounds, pt seen sleeping at bedside. TPN remains primary means to nutrition, admitting regimen runs over 14 hrs & provides     Pain:  Skin: 1+ BLE edema  GI: abdomen soft, nondistended; fistula bag with thin brown fluid, ileostomy site 78 year old male with past medical history of AFib/Flutter 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 Prolonged hospital course significant for recurrent AKIs, cholestatic transaminitis s/p perc cony, prior DVTs, PNAs, fungemia, sinus pauses s/p pacemaker and recurrent bleeding from fistula recently s/p IR embolization of L branch of inferior epigastric artery (4/16), with multiple presentations afterwards for recurrent bleeding at fistula site requiring transfusions.     Chart reviewed. Pt well known to nutrition services. Discussed with IDT today during AM rounds, pt seen sleeping at bedside. TPN remains primary means to nutrition, admitting regimen runs over 14 hrs & provides 22.3 kcals/kg, 16.2 non-protein kcals/kg, 1.5g protein/kg IBW, GIR 3.04 mg/kg/min [using most recent wt of 98kg]. Currently NPO, plan for endoscopy today. Pt with bowel length <120cm without colon in continuity & high output fistula [>500ml daily]. Insufficient bowel to maintain/restore nutrition status through PO diet alone- unclear amount absorbed from PO diet. No significant weight changes noted since last admission per EMR. Labs remarkable for elevated fingersticks [to adjust insulin outside TPN bag prn], elevated K+ today [holding K+ in TPN today], total bilirubin 5.5/direct bilirubin 4.1 <H>, elevated ALT/AST. Meds- on creon TID, synthroid [administer 30-60 minutes before food; separate at least 4hrs from calcium or iron-containing products or bile acid sequestrants], cholestyramine. Plan to increase TPN infusion time, increase dextrose & protein in bag to better meet needs, account for increased needs in setting of wounds & high outputs. Continue MWF schedule for zinc & copper in bag, check levels monthly- recs discussed with team. RDN will continue to monitor, reassess, and intervene as appropriate.     Pain: 6/10 pain noted to abdomen  Skin: 1+ BLE edema, EC fistula site  GI: abdomen soft, nondistended; fistula bag with thin brown fluid, ileostomy site

## 2024-05-09 NOTE — PROGRESS NOTE ADULT - SUBJECTIVE AND OBJECTIVE BOX
INTERVAL HPI/OVERNIGHT EVENTS:  O/N: transferred to SICU for hemorrhagic shock - unclear if bleeding is intraluminal or from granulation tissue. Urine studies sent for increase in Cr, FeNa 0.4% prerenal. Post-transfusion hgb 7.1, additional 1U PRBC ordered. BCx drawn 2/2 leukocytosis. MRSA swab negative. Zofran for nausea.    STATUS POST:      POST OPERATIVE DAY #:     SUBJECTIVE:  Patient seen and examined by chief resident and team on AM rounds. Patient very fatigued after last night. 1u FFP for INR 2.01. AM lactate 2.8 (4.0). Calcium gluc x1. 1 u Platelets for platelet 62. TPN ordered w/o K. s/p ileoscopy, noting erythematous patch of bowel just deep to the ECF, without any active bleeding.    MEDICATIONS  (STANDING):  albuterol/ipratropium for Nebulization 3 milliLiter(s) Nebulizer every 6 hours  atorvastatin 20 milliGRAM(s) Oral at bedtime  chlorhexidine 2% Cloths 1 Application(s) Topical daily  cholestyramine Powder (Sugar-Free) 4 Gram(s) Oral every 24 hours  dextrose 10% Bolus 125 milliLiter(s) IV Bolus once  dextrose 5%. 1000 milliLiter(s) (50 mL/Hr) IV Continuous <Continuous>  dextrose 5%. 1000 milliLiter(s) (100 mL/Hr) IV Continuous <Continuous>  dextrose 50% Injectable 12.5 Gram(s) IV Push once  dextrose 50% Injectable 25 Gram(s) IV Push once  glucagon  Injectable 1 milliGRAM(s) IntraMuscular once  insulin lispro (ADMELOG) corrective regimen sliding scale   SubCutaneous every 6 hours  levothyroxine Injectable 40 MICROGram(s) IV Push <User Schedule>  lipid, fat emulsion (Fish Oil and Plant Based) 20% Infusion 0.51 Gm/kG/Day (20.83 mL/Hr) IV Continuous <Continuous>  methylPREDNISolone sodium succinate Injectable 40 milliGRAM(s) IV Push every 8 hours  pancrelipase  (CREON 24,000 Lipase Units) 1 Capsule(s) Oral three times a day with meals  Parenteral Nutrition - Adult 1 Each TPN Continuous <Continuous>  Parenteral Nutrition - Adult 1 Each TPN Continuous <Continuous>  venlafaxine XR. 300 milliGRAM(s) Oral daily    MEDICATIONS  (PRN):  dextrose Oral Gel 15 Gram(s) Oral once PRN Blood Glucose LESS THAN 70 milliGRAM(s)/deciliter      Vital Signs Last 24 Hrs  T(C): 36 (09 May 2024 14:00), Max: 36.3 (08 May 2024 18:04)  T(F): 96.8 (09 May 2024 14:00), Max: 97.3 (08 May 2024 18:04)  HR: 87 (09 May 2024 14:00) (84 - 112)  BP: 141/65 (09 May 2024 14:00) (83/48 - 162/71)  BP(mean): 94 (09 May 2024 14:00) (58 - 102)  RR: 15 (09 May 2024 14:00) (15 - 26)  SpO2: 98% (09 May 2024 14:00) (75% - 100%)    Parameters below as of 09 May 2024 14:00  Patient On (Oxygen Delivery Method): room air        PHYSICAL EXAM:      Constitutional: A&Ox3    Breasts:    Respiratory: non labored breathing, no respiratory distress    Cardiovascular: NSR, RRR    Gastrointestinal:                 Incision:    Genitourinary:    Extremities: (-) edema                  I&O's Detail    08 May 2024 07:01  -  09 May 2024 07:00  --------------------------------------------------------  IN:    Fat Emulsion (Fish Oil &amp; Plant Based) 20% Infusion: 250 mL    Oral Fluid: 240 mL    PRBCs (Packed Red Blood Cells): 650 mL    Sodium Chloride 0.9% Bolus: 1000 mL    TPN (Total Parenteral Nutrition): 1000.8 mL  Total IN: 3140.8 mL    OUT:    Drain (mL): 2925 mL    Ileostomy (mL): 25 mL    Voided (mL): 800 mL  Total OUT: 3750 mL    Total NET: -609.2 mL      09 May 2024 07:01  -  09 May 2024 14:43  --------------------------------------------------------  IN:    IV PiggyBack: 100 mL    IV PiggyBack: 50 mL    Plasma: 200 mL    Platelets - Single Donor: 223 mL    TPN (Total Parenteral Nutrition): 588 mL  Total IN: 1161 mL    OUT:    Voided (mL): 200 mL  Total OUT: 200 mL    Total NET: 961 mL          LABS:                        8.3    20.08 )-----------( 62       ( 09 May 2024 05:30 )             24.2     05-09    137  |  108  |  76<H>  ----------------------------<  244<H>  5.6<H>   |  22  |  1.92<H>    Ca    7.0<L>      09 May 2024 05:30  Phos  4.4     05-09  Mg     2.6     05-09    TPro  3.7<L>  /  Alb  1.4<L>  /  TBili  5.5<H>  /  DBili  4.1<H>  /  AST  330<H>  /  ALT  222<H>  /  AlkPhos  68  05-09    PT/INR - ( 09 May 2024 05:30 )   PT: 22.6 sec;   INR: 2.02          PTT - ( 09 May 2024 05:30 )  PTT:40.5 sec  Urinalysis Basic - ( 09 May 2024 05:30 )    Color: x / Appearance: x / SG: x / pH: x  Gluc: 244 mg/dL / Ketone: x  / Bili: x / Urobili: x   Blood: x / Protein: x / Nitrite: x   Leuk Esterase: x / RBC: x / WBC x   Sq Epi: x / Non Sq Epi: x / Bacteria: x        RADIOLOGY & ADDITIONAL STUDIES:

## 2024-05-09 NOTE — DIETITIAN INITIAL EVALUATION ADULT - PERTINENT LABORATORY DATA
05-09    137  |  108  |  76<H>  ----------------------------<  244<H>  5.6<H>   |  22  |  1.92<H>    Ca    7.0<L>      09 May 2024 05:30  Phos  4.4     05-09  Mg     2.6     05-09    TPro  3.7<L>  /  Alb  1.4<L>  /  TBili  5.5<H>  /  DBili  4.1<H>  /  AST  330<H>  /  ALT  222<H>  /  AlkPhos  68  05-09  POCT Blood Glucose.: 242 mg/dL (05-09-24 @ 05:54)  A1C with Estimated Average Glucose Result: <4.2 % (04-17-24 @ 05:30)  A1C with Estimated Average Glucose Result: 5.1 % (09-11-23 @ 04:47)  A1C with Estimated Average Glucose Result: 4.8 % (06-27-23 @ 05:30)

## 2024-05-09 NOTE — DIETITIAN INITIAL EVALUATION ADULT - ADD RECOMMEND
1. c/w TPN via PICC as primary means to nutrition- recommend 370g Dextrose, 160g AA, 50g SMOF lipids over 16hrs; provides 2398 kcals, 160g protein, GIR 3.93 mg/kg/min  - provides 28.7 kcals/kg, 21.1 non-protein kcals/kg, 1.92g protein/kg IBW  - fluid & lytes per team  - monitor renal & hepatic labs, adjust substrates as indicated  - fluid & lytes per team, monitor outputs & adjust for losses prn  2. PO diet per MD discretion  - previously on low fiber diet, resume as medically feasible  - recommend 1pk Ottoniel BID; provides 160 kcals, 14g arginine, 14g glutamine, 5g collagen   3. Obtain weekly lipid panel  - hold / decrease lipid infusion if TG >400  - trend hepatic labs, adjust substrates in bag prn  - recommend to continue zinc and copper in bag 3x per week--- check levels monthly  4. Daily BMP/Mg/Phos, fingersticks Q4-6hrs  - monitor lytes closely & replete outside bag prn  5. Monitor I&Os  6. Recommend daily/Weekly weights for trending  7. Consider UUN & fecal fat studies to monitor changes in PO absorption  8. Close monitoring of weights, outputs, labs, adjust TPN as indicated to prevent over/underfeeding

## 2024-05-10 NOTE — PROGRESS NOTE ADULT - SUBJECTIVE AND OBJECTIVE BOX
S: No specific complaints at this time. He explained that he is hopeful Remicade will help with a possible ileitis that is contributing to GIB. No chills / fevers.     Overnight events: R IJ TLC placed. STAT labs hgb 8.5 (8.4), Cr 2 (1.7), K 5.4. s/p IR - segmental hyperemia however unable to embolize due to risk of bowel ischemia (IVF 1L, 1U PRBC, intermittent pressors). Post-procedure STAT labs K 5.5, Cr 2.24. BCx prior to CVC growing GPC in clusters, given growth in 3/4 bottles, hypothermia - decision made to treat. Started cefepime and vanc renal dosing. Poole placed. PICC removed, TPN held. D10 started. 500cc bolus. Blood Cx PCR: MSSA.  Albumin 250cc.    O:   Vitals: 	T 97, HR 80s, /47 on bipap sat 100%    Exam:   Gen: Pale, not in distress, face somewhat edematous  Neuro: oriented x3. No focal defecits   CV: normal rate, no murmurs, +1 bilateral lower extremity edema  Pulm: No respiratory distress  Abd: Soft, non-distended, clot in ileostomy oriface, fistula bag w/ thin brown fluid, no blood.   Ext: Warm and perfused  Skin: extensive appliance on abdomen. No rashes erythema or ecchymosis  MSK: No joint swelling noted  Psych: Normal affect, appears anxious    I/o:    LR bolus 500 (500)   (996)    (500)  Fistula 35 (485)	  Ileostomy 0 (0)  Net even 1036 / 1492      LABS:  cret                        8.4    22.67 )-----------( 88       ( 10 May 2024 05:25 )             24.1     05-10    134<L>  |  104  |  88<H>  ----------------------------<  185<H>  5.2   |  20<L>  |  2.34<H>    Ca    7.6<L>      10 May 2024 05:25  Phos  5.4     05-10  Mg     2.6     05-10    TPro  4.6<L>  /  Alb  1.7<L>  /  TBili  6.7<H>  /  DBili  5.2<H>  /  AST  257<H>  /  ALT  231<H>  /  AlkPhos  85  05-10    PT/INR - ( 09 May 2024 22:20 )   PT: 17.2 sec;   INR: 1.53          PTT - ( 09 May 2024 22:20 )  PTT:30.6 sec    A/P: 78 year old male with past medical history of AFib/Flutter s/p DCCV & pacemaker, CKD, cholestatic transaminitis, DVT, Crohns SBO s/p open TRE/SBR c/b prolonged hospitalization for abdominal wall dehiscence and 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  Admitted for symptomatic issue -- recent primary concern has been recurrent bleeding requiring frequent transfusion and repeated admissions from granulation tissue surrounding fistula orifice as well as small bowel ulcer S/p IR embolization of L branch of inferior epigastric artery (4/16) and s/p scope & clip. Transferred to SICU 5/8 overnight for 2L blood loss from fistula site with subsequent hypotension, now with positive blood cultures.     - Remicade following clearance of blood cultures   - Steroid enema into ileostomy daily  - Appropriate resuscitation   - Care per SICU

## 2024-05-10 NOTE — PROGRESS NOTE ADULT - ASSESSMENT
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 Prolonged hospital course significant for recurrent AKIs, cholestatic transaminitis s/p perc cony, prior DVTs, PNAs, fungemia, sinus pauses s/p pacemaker and recurrent bleeding from fistula most recently s/p IR embolization of L branch of inferior epigastric artery via R groin access (4/16), with multiple readmissions for continued bleeding from fistula and ostomy sites. S/p ileoscopy on noting an ulcer w/ clot proximal to stoma w/ hemoclip was applied (5/1). Admitted for symptomatic anemia secondary to bleeding from fistula site and ileostomy sites. Transferred to SICU 5/8 overnight for 2L blood loss from fistula site with subsequent hypotension. Now s/p ileoscopy, noting erythematous patch of bowel just deep to the ECF, without any active bleeding (5/9). S/p IR angiography noting  several areas of hyperemia in the jejunum adjacent to the stoma however no evidence of active bleeding (5/9).     Neuro: Dilaudid prn for pain. Hx: Anxiety: Continue Effexor. Ambien on hold   CV: Hemorrhagic shock on 5/8, 2/2 EC Fistula bleed, now resolved. 2L of bright red blood. S/p total of 3 units of PRBCs (overnight 5/8), responded appropriately 8.3 (5). Keep MAPs > 65. Lactate 6.1 -->4.0 ---> 2.8. Hx: AF: Metoprolol on hold 2/2 hypotension HLD: Continue Atorvastatin   Pulm: Satting well on RA, nightly BIPAP at 40%.   GI: IR 5/9: segmental hyperemia, unable to embolize c/f bowel ischemia. Ileoscopy 5/9: erythematous patch of bowel just deep to the ECF, without active bleeding. CT 5/2 with bowel wall thickening. D/c methylprednisolone, start hydrocortisone enemas via the fistula bid as per GI in setting of MSSA bacteremia. Continue TPN.    : Voids. ELVI likely from acute blood loss. Creatinine 2.07. Baseline 1.2 - 1.4. Urine Studies/Cystatin C pending.   ID: Bacteremia 2/2 MSSA grown in BCx (5/9). Ancef (5/10- ), d/c: // Cefepime (5/10--), Vanc x1 (5/9).   Endo: mISSc. Hx: Hypothyroidism. Continue synthroid.   Heme: Anemia 2/2 EC fistula bleed. Received a total of 8 units of PRBCs, 1 FFP, 1 plt this admission. Trend CBC [id number]   Lines: RIJ TLC (5/9--) L. Radial A-line (5/9--) d/c: // PICC (X-5/10)  PPx: SCDs  Dispo: SICU

## 2024-05-10 NOTE — PROGRESS NOTE ADULT - ASSESSMENT
Stable overnight  Long discussion with team re next step which will be IR angio emergently with the next bleeding episode

## 2024-05-10 NOTE — PROGRESS NOTE ADULT - ASSESSMENT
sepsis therefore unable to use biologics  stop steroids as well  discussed with Dr Peterson  steroid enema via ileostomy

## 2024-05-10 NOTE — PROGRESS NOTE ADULT - SUBJECTIVE AND OBJECTIVE BOX
INTERVAL HPI/OVERNIGHT EVENTS:  ON: R IJ TLC placed. STAT labs hgb 8.5 (8.4), Cr 2 (1.7), K 5.4. s/p IR - segmental hyperemia however unable to embolize due to risk of bowel ischemia (IVF 1L, 1U PRBC, intermittent pressors). Post-procedure STAT labs K 5.5, Cr 2.24. BCx prior to CVC growing GPC in clusters, given growth in 3/4 bottles, hypothermia - decision made to treat. Started cefepime and vanc renal dosing. Mccauley placed. PICC removed, TPN held. D10 started. 500cc bolus. Blood Cx PCR: MSSA.  Albumin 250cc.    STATUS POST:   Now s/p ileoscopy, noting erythematous patch of bowel just deep to the ECF, without any active bleeding (5/9).   S/p IR angiography noting  several areas of hyperemia in the jejunum adjacent to the stoma however no evidence of active bleeding (5/9).     POST OPERATIVE DAY #: 1    SUBJECTIVE:  Patient seen and examined by chief resident and team on AM rounds. This AM, patient in better spirits. TPN reordered. Started hydrocortisone enema via fistula bid, holding off remicaid and stopped IV methylpred per Kwabena in setting of MSSA bacteremia. dc vanc/cefepime, started ancef per ID. cystatin C GFR 15,     MEDICATIONS  (STANDING):  albuterol/ipratropium for Nebulization 3 milliLiter(s) Nebulizer every 6 hours  atorvastatin 20 milliGRAM(s) Oral at bedtime  ceFAZolin   IVPB 2000 milliGRAM(s) IV Intermittent every 12 hours  chlorhexidine 2% Cloths 1 Application(s) Topical daily  cholestyramine Powder (Sugar-Free) 4 Gram(s) Oral every 24 hours  dextrose 10% Bolus 125 milliLiter(s) IV Bolus once  dextrose 10%. 1000 milliLiter(s) (40 mL/Hr) IV Continuous <Continuous>  dextrose 5%. 1000 milliLiter(s) (100 mL/Hr) IV Continuous <Continuous>  dextrose 5%. 1000 milliLiter(s) (50 mL/Hr) IV Continuous <Continuous>  dextrose 50% Injectable 25 Gram(s) IV Push once  dextrose 50% Injectable 12.5 Gram(s) IV Push once  glucagon  Injectable 1 milliGRAM(s) IntraMuscular once  hydrocortisone Enema 100 milliGRAM(s) Rectal every 12 hours  insulin lispro (ADMELOG) corrective regimen sliding scale   SubCutaneous every 6 hours  levothyroxine Injectable 40 MICROGram(s) IV Push <User Schedule>  lipid, fat emulsion (Fish Oil and Plant Based) 20% Infusion 0.51 Gm/kG/Day (20.83 mL/Hr) IV Continuous <Continuous>  pancrelipase  (CREON 24,000 Lipase Units) 1 Capsule(s) Oral three times a day with meals  Parenteral Nutrition - Adult 1 Each TPN Continuous <Continuous>  venlafaxine XR. 300 milliGRAM(s) Oral daily    MEDICATIONS  (PRN):  dextrose Oral Gel 15 Gram(s) Oral once PRN Blood Glucose LESS THAN 70 milliGRAM(s)/deciliter    Vital Signs Last 24 Hrs  T(C): 36.1 (10 May 2024 08:09), Max: 36.2 (09 May 2024 17:30)  T(F): 97 (10 May 2024 08:09), Max: 97.2 (09 May 2024 17:30)  HR: 96 (10 May 2024 13:00) (85 - 127)  BP: 97/51 (10 May 2024 13:00) (92/50 - 155/66)  BP(mean): 71 (10 May 2024 13:00) (66 - 99)  RR: 18 (10 May 2024 13:00) (13 - 30)  SpO2: 96% (10 May 2024 13:00) (86% - 100%)    Parameters below as of 10 May 2024 13:00  Patient On (Oxygen Delivery Method): room air        PHYSICAL EXAM:  Constitutional: A&Ox3. Resting comfortably in bed.   HEENT: MMM  Respiratory: non labored breathing, no respiratory distress. Satting 100% on BIPAP.   Cardiovascular: NSR, RRR  Gastrointestinal: soft, NTND abdomen. Midline ECF with greenish brown output, with granulation tissue surrounding. Ileostomy with minimal output. No bloody output.   Genitourinary: mccauley with yellow urine.   Extremities: 3+ pitting edema in BLE. WWP.   Skin: jaundiced.   Lines: R IJ TLC present. L Radial A line. PICC removed.     I&O's Detail    09 May 2024 07:01  -  10 May 2024 07:00  --------------------------------------------------------  IN:    dextrose 10%: 280 mL    Fat Emulsion (Fish Oil &amp; Plant Based) 20% Infusion: 41.6 mL    IV PiggyBack: 100 mL    IV PiggyBack: 50 mL    IV PiggyBack: 300 mL    Plasma: 200 mL    Platelets - Single Donor: 223 mL    Sodium Chloride 0.9% Bolus: 500 mL    TPN (Total Parenteral Nutrition): 998 mL  Total IN: 2692.6 mL    OUT:    Drain (mL): 535 mL    Ileostomy (mL): 0 mL    Indwelling Catheter - Urethral (mL): 280 mL    Voided (mL): 350 mL  Total OUT: 1165 mL    Total NET: 1527.6 mL      10 May 2024 07:01  -  10 May 2024 13:28  --------------------------------------------------------  IN:    dextrose 10%: 240 mL    Oral Fluid: 200 mL  Total IN: 440 mL    OUT:    Indwelling Catheter - Urethral (mL): 260 mL  Total OUT: 260 mL    Total NET: 180 mL          LABS:                        8.4    22.67 )-----------( 88       ( 10 May 2024 05:25 )             24.1     05-10    134<L>  |  104  |  88<H>  ----------------------------<  185<H>  5.2   |  20<L>  |  2.34<H>    Ca    7.6<L>      10 May 2024 05:25  Phos  5.4     05-10  Mg     2.6     05-10    TPro  4.6<L>  /  Alb  1.7<L>  /  TBili  6.7<H>  /  DBili  5.2<H>  /  AST  257<H>  /  ALT  231<H>  /  AlkPhos  85  05-10    PT/INR - ( 09 May 2024 22:20 )   PT: 17.2 sec;   INR: 1.53          PTT - ( 09 May 2024 22:20 )  PTT:30.6 sec  Urinalysis Basic - ( 10 May 2024 05:25 )    Color: x / Appearance: x / SG: x / pH: x  Gluc: 185 mg/dL / Ketone: x  / Bili: x / Urobili: x   Blood: x / Protein: x / Nitrite: x   Leuk Esterase: x / RBC: x / WBC x   Sq Epi: x / Non Sq Epi: x / Bacteria: x        RADIOLOGY & ADDITIONAL STUDIES:

## 2024-05-10 NOTE — PHYSICAL THERAPY INITIAL EVALUATION ADULT - PERTINENT HX OF CURRENT PROBLEM, REHAB EVAL
78M well known to surgical service with PMHx of AFib/Flutter 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 Prolonged hospital course significant for recurrent AKIs, cholestatic transaminitis s/p perc cony, prior DVTs, PNAs, fungemia, sinus pauses s/p pacemaker and recurrent bleeding from fistula recently s/p IR embolization of L branch of inferior epigastric artery via R groin access (4/16), with mutiple presentations afterwards for recurrent bleeding at fistula site requiring transfusions.     Patient was discharged 2 days ago and was doing well at home until last night when he began having difficult to control bleeding around ostomy site with associated hypotension and tachycardia. Bleeding eventually resloved but patient reports feeling slightly lightheaded and estimates that he lost about 2 units of blood.

## 2024-05-10 NOTE — CONSULT NOTE ADULT - ASSESSMENT
# MSSA bacteremia, hospital onset, line associated  - BCx x2 + on 5/9  - Likely RUE PICC line for chronic TPN as source. This was removed and R IJ CVC placed on 5/9. No signs of metastatic infection on exam, but does have remote bilateral knee arthroplasties (no signs of infection there). Also has loop recorder but these devices are entirely subQ.    Recommendations:  - Repeat blood cultures x2  - TTE. PREDICT score is 0, plan to defer RICO.  - Stop vanc/cefepime and start cefazolin 2g IV q12h. Ultimate duration TBD but likely minimum 4 weeks given presence of hardware.    ID Team 1 will follow # MSSA bacteremia, hospital onset, line associated  - BCx x2 + on 5/9  - Likely RUE PICC line for chronic TPN as source. This was removed and R IJ CVC placed on 5/9. No signs of metastatic infection on exam, but does have remote bilateral knee arthroplasties (no signs of infection there). Also has loop recorder but these devices are entirely subQ.    Recommendations:  - Repeat blood cultures x2  - TTE. PREDICT score is 0, plan to defer RICO.  - Stop vanc/cefepime and start cefazolin 2g IV q12h. Ultimate duration TBD but likely minimum 4 weeks given presence of hardware.  - R IJ CVC will ultimately have to be replaced as was placed while BCx were +    ID Team 1 will follow

## 2024-05-10 NOTE — CONSULT NOTE ADULT - SUBJECTIVE AND OBJECTIVE BOX
INFECTIOUS DISEASES INITIAL CONSULT NOTE    HPI:  78M w/ hx remote bilateral TKRs, andCrohn’s disease and prolonged hospitalization with multiple readmissions since 2023 with initial admission for SBO s/p open TRE and SBR x 3, then ileocolic resection and small bowel anastomosis c/b feculent peritonitis and IA fluid collection s/p ex-lap and washout with mesh closure (23), then acalculous cholecystitis s/p perc cony (), on chronic TPN via PICC with C.glab CLABSI (2023), most recently seen by ID in 2024 for possible HAP s/p 7 day course of cefepime, with re-admissions in 2024 for bleeding from enterocutaneous fistula s/p IR embolization and repeat admissions for transfusions, last discharged on , then re-presented on  after developed worsened bleeding from fistula site, improved after transfusions, then on  PM had rapid response and transferred to SICU for acute 2L blood loss from fistula site and hypotension, s/p angio by IR on  without ?blush identified pending further evaluation for embolization. He remained afebrile throughout this episode, but began having uptrending leukocytosis on , up to 30k on , maybe in part due to acute blood loss/stress response, and high dose steroids for Crohns (and eventual plan is for Remicade but in the meantime this and steroids are on hold). Blood cultures x2 from  during rapid response +GPC in clusters, MSSA on biofire, for which ID is consulted. Patient has had PICC line in place for TPN for >3 months. This was removed and CVC was placed on .    On interview patient denies any recent infectious symptoms over the past few weeks, and none currently. No fevers/chills/sweats. No new joint/back pains, chest pain, SOB.      PAST MEDICAL & SURGICAL HISTORY:  Essential hypertension  Hypertension      Atrial fibrillation  s/p cardioversion  and   Pt. reports 4 DCCV  Now on Amiodarone 200mg PO bid and Eliquis 5mg PO bid  Last DCCV 4 yrs ago at [facility]      Crohn's disease  s/p partial resection of ileum      Hyperlipidemia      Hypothyroidism      History of depression  On Venlafaxine ER 150mg PO bid      Junctional rhythm      Bradycardia      H/O knee surgery      History of cataract surgery      S/P appendectomy            Review of Systems:   Constitutional, eyes, ENT, cardiovascular, respiratory, gastrointestinal, genitourinary, integumentary, neurological, psychiatric and heme/lymph are otherwise negative other than noted above       ANTIBIOTICS:  MEDICATIONS  (STANDING):  albumin human 25% IVPB 50 milliLiter(s) IV Intermittent every 8 hours  albuterol/ipratropium for Nebulization 3 milliLiter(s) Nebulizer every 6 hours  atorvastatin 20 milliGRAM(s) Oral at bedtime  ceFAZolin   IVPB 2000 milliGRAM(s) IV Intermittent every 12 hours  chlorhexidine 2% Cloths 1 Application(s) Topical daily  cholestyramine Powder (Sugar-Free) 4 Gram(s) Oral every 24 hours  dextrose 10% Bolus 125 milliLiter(s) IV Bolus once  dextrose 5%. 1000 milliLiter(s) (100 mL/Hr) IV Continuous <Continuous>  dextrose 5%. 1000 milliLiter(s) (50 mL/Hr) IV Continuous <Continuous>  dextrose 50% Injectable 25 Gram(s) IV Push once  dextrose 50% Injectable 12.5 Gram(s) IV Push once  glucagon  Injectable 1 milliGRAM(s) IntraMuscular once  insulin lispro (ADMELOG) corrective regimen sliding scale   SubCutaneous every 6 hours  levothyroxine Injectable 40 MICROGram(s) IV Push <User Schedule>  lipid, fat emulsion (Fish Oil and Plant Based) 20% Infusion 0.51 Gm/kG/Day (20.83 mL/Hr) IV Continuous <Continuous>  LORazepam   Injectable 0.5 milliGRAM(s) IV Push once  pancrelipase  (CREON 24,000 Lipase Units) 1 Capsule(s) Oral three times a day with meals  Parenteral Nutrition - Adult 1 Each TPN Continuous <Continuous>  venlafaxine XR. 300 milliGRAM(s) Oral daily    MEDICATIONS  (PRN):  dextrose Oral Gel 15 Gram(s) Oral once PRN Blood Glucose LESS THAN 70 milliGRAM(s)/deciliter      Allergies    penicillin (Angioedema)    Intolerances        SOCIAL HISTORY:  Lives in [place] with wife and has a 24/7 HHA    FAMILY HISTORY:   no FH leading to current infection    Vital Signs Last 24 Hrs  T(C): 36.2 (10 May 2024 17:14), Max: 36.3 (10 May 2024 13:09)  T(F): 97.2 (10 May 2024 17:14), Max: 97.3 (10 May 2024 13:09)  HR: 117 (10 May 2024 20:35) (85 - 127)  BP: 102/54 (10 May 2024 14:00) (92/50 - 102/54)  BP(mean): 73 (10 May 2024 14:00) (66 - 73)  RR: 18 (10 May 2024 20:35) (16 - 30)  SpO2: 95% (10 May 2024 20:35) (86% - 100%)    Parameters below as of 10 May 2024 20:35  Patient On (Oxygen Delivery Method): room air        24 @ 07:  -  05-10-24 @ 07:00  --------------------------------------------------------  IN: 2692.6 mL / OUT: 1165 mL / NET: 1527.6 mL    05-10-24 @ 07:01  -  05-10-24 @ 21:04  --------------------------------------------------------  IN: 1433.6 mL / OUT: 595 mL / NET: 838.6 mL        PHYSICAL EXAM:  Constitutional: alert, NAD  Eyes: the sclera and conjunctiva were normal.   ENT: the ears and nose were normal in appearance.   Neck: R IJ CVC  Pulmonary: clear anteriorly  Heart: heart rate was normal and rhythm regular, normal S1 and S2, no murmur  Abdomen: soft, non-tender. Midline ECF with greenish brown output. Ileostomy with minimal output.   MSK: Bilateral TKR scars without knee swelling/tenderness/erythema. No other joint swelling/pain. Unable to assess for spinal tenderness today due to patient positioning.  Skin: no stigmata of IE      LABS:                        8.3    23.62 )-----------( 91       ( 10 May 2024 15:33 )             23.9     05-10    135  |  102  |  90<H>  ----------------------------<  168<H>  4.8   |  20<L>  |  2.40<H>    Ca    8.2<L>      10 May 2024 15:33  Phos  5.5     05-10  Mg     2.6     05-10    TPro  5.0<L>  /  Alb  2.2<L>  /  TBili  7.9<H>  /  DBili  x   /  AST  236<H>  /  ALT  239<H>  /  AlkPhos  95  05-10    PT/INR - ( 09 May 2024 22:20 )   PT: 17.2 sec;   INR: 1.53          PTT - ( 09 May 2024 22:20 )  PTT:30.6 sec  Urinalysis Basic - ( 10 May 2024 16:50 )    Color: Dark Yellow / Appearance: Clear / S.027 / pH: x  Gluc: x / Ketone: Negative mg/dL  / Bili: Small / Urobili: 0.2 mg/dL   Blood: x / Protein: Negative mg/dL / Nitrite: Negative   Leuk Esterase: Trace / RBC: 21 /HPF / WBC 2 /HPF   Sq Epi: x / Non Sq Epi: 2 /HPF / Bacteria: Few /HPF        MICROBIOLOGY:  Reviewed    RADIOLOGY & ADDITIONAL STUDIES:  Reviewed

## 2024-05-10 NOTE — PROGRESS NOTE ADULT - SUBJECTIVE AND OBJECTIVE BOX
Pt seen and examined   events noted  s/p IR study no bleed  no bleed this am  blood cultures positive    REVIEW OF SYSTEMS:  Constitutional: No fever,    Cardiovascular: No chest pain, palpitations,    Gastrointestinal: No abdominal or epigastric pain. No nausea, vomiting    Skin: No itching, burning, rashes or lesions       MEDICATIONS:  MEDICATIONS  (STANDING):  albuterol/ipratropium for Nebulization 3 milliLiter(s) Nebulizer every 6 hours  atorvastatin 20 milliGRAM(s) Oral at bedtime  cefepime   IVPB 1000 milliGRAM(s) IV Intermittent every 12 hours  chlorhexidine 2% Cloths 1 Application(s) Topical daily  cholestyramine Powder (Sugar-Free) 4 Gram(s) Oral every 24 hours  dextrose 10% Bolus 125 milliLiter(s) IV Bolus once  dextrose 10%. 1000 milliLiter(s) (40 mL/Hr) IV Continuous <Continuous>  dextrose 5%. 1000 milliLiter(s) (100 mL/Hr) IV Continuous <Continuous>  dextrose 5%. 1000 milliLiter(s) (50 mL/Hr) IV Continuous <Continuous>  dextrose 50% Injectable 25 Gram(s) IV Push once  dextrose 50% Injectable 12.5 Gram(s) IV Push once  glucagon  Injectable 1 milliGRAM(s) IntraMuscular once  hydrocortisone Enema 100 milliGRAM(s) Rectal every 12 hours  insulin lispro (ADMELOG) corrective regimen sliding scale   SubCutaneous every 6 hours  levothyroxine Injectable 40 MICROGram(s) IV Push <User Schedule>  lipid, fat emulsion (Fish Oil and Plant Based) 20% Infusion 0.51 Gm/kG/Day (20.83 mL/Hr) IV Continuous <Continuous>  pancrelipase  (CREON 24,000 Lipase Units) 1 Capsule(s) Oral three times a day with meals  Parenteral Nutrition - Adult 1 Each TPN Continuous <Continuous>  venlafaxine XR. 300 milliGRAM(s) Oral daily    MEDICATIONS  (PRN):  dextrose Oral Gel 15 Gram(s) Oral once PRN Blood Glucose LESS THAN 70 milliGRAM(s)/deciliter      Allergies    penicillin (Angioedema)    Intolerances        Vital Signs Last 24 Hrs  T(C): 36.1 (10 May 2024 08:09), Max: 36.2 (09 May 2024 17:30)  T(F): 97 (10 May 2024 08:09), Max: 97.2 (09 May 2024 17:30)  HR: 87 (10 May 2024 09:00) (85 - 111)  BP: 92/50 (10 May 2024 02:00) (92/50 - 162/69)  BP(mean): 66 (10 May 2024 02:00) (66 - 99)  RR: 23 (10 May 2024 09:00) (13 - 23)  SpO2: 97% (10 May 2024 09:00) (93% - 100%)    Parameters below as of 10 May 2024 09:00  Patient On (Oxygen Delivery Method): room air        05-09 @ 07:01  -  05-10 @ 07:00  --------------------------------------------------------  IN: 2692.6 mL / OUT: 1165 mL / NET: 1527.6 mL    05-10 @ 07:01  -  05-10 @ 10:14  --------------------------------------------------------  IN: 220 mL / OUT: 110 mL / NET: 110 mL        PHYSICAL EXAM:    General:   in no acute distress  HEENT: MMM, conjunctiva and sclera clear  Gastrointestinal: Soft non-tender non-distended;  bags on  Skin: Warm and dry. No obvious rash    LABS:  ABG - ( 09 May 2024 22:23 )  pH, Arterial: 7.33  pH, Blood: x     /  pCO2: 39    /  pO2: 129   / HCO3: 21    / Base Excess: -4.9  /  SaO2: 99.7                CBC Full  -  ( 10 May 2024 05:25 )  WBC Count : 22.67 K/uL  RBC Count : 2.75 M/uL  Hemoglobin : 8.4 g/dL  Hematocrit : 24.1 %  Platelet Count - Automated : 88 K/uL  Mean Cell Volume : 87.6 fl  Mean Cell Hemoglobin : 30.5 pg  Mean Cell Hemoglobin Concentration : 34.9 gm/dL  Auto Neutrophil # : x  Auto Lymphocyte # : x  Auto Monocyte # : x  Auto Eosinophil # : x  Auto Basophil # : x  Auto Neutrophil % : x  Auto Lymphocyte % : x  Auto Monocyte % : x  Auto Eosinophil % : x  Auto Basophil % : x    05-10    134<L>  |  104  |  88<H>  ----------------------------<  185<H>  5.2   |  20<L>  |  2.34<H>    Ca    7.6<L>      10 May 2024 05:25  Phos  5.4     05-10  Mg     2.6     05-10    TPro  4.6<L>  /  Alb  1.7<L>  /  TBili  6.7<H>  /  DBili  5.2<H>  /  AST  257<H>  /  ALT  231<H>  /  AlkPhos  85  05-10    PT/INR - ( 09 May 2024 22:20 )   PT: 17.2 sec;   INR: 1.53          PTT - ( 09 May 2024 22:20 )  PTT:30.6 sec      Urinalysis Basic - ( 10 May 2024 05:25 )    Color: x / Appearance: x / SG: x / pH: x  Gluc: 185 mg/dL / Ketone: x  / Bili: x / Urobili: x   Blood: x / Protein: x / Nitrite: x   Leuk Esterase: x / RBC: x / WBC x   Sq Epi: x / Non Sq Epi: x / Bacteria: x                RADIOLOGY & ADDITIONAL STUDIES (The following images were personally reviewed):

## 2024-05-10 NOTE — PROGRESS NOTE ADULT - SUBJECTIVE AND OBJECTIVE BOX
angio results discussed with Dr Felix  hgb 8+  Bun/Cr elevated  PICC removed  On IVAB  PLan for remicade ?   GI/ID F/U  ICU monitoring

## 2024-05-11 NOTE — PROGRESS NOTE ADULT - ASSESSMENT
Patient is a 77yo M with complicated history,, PMH of AFib/Flutter s/p DCCV, Crohn's, prior ileocectomy and open appendectomy, extensive abdominal surgeries and ileostomy, short-bowel syndrome, hypothyroidism, depression, recently seen at the Emergency Department for blood transfusion and multiple recent admissions for transfusions who presents as a direct admission for recurrent anemia. FFP x 3 on May 3rd.     - Factors IX and XI are low consistent with liver dysfunction. Low-normal Fibrinogen    -elevated FVIII and VWF which are acute phase reactants  -consider checking FVII  - Reason for liver dysfunction is unexplained.  - would continue to trend PT/PTT/INR   -would trend fibrinogen with low treshold for cyro if fibrinogen decreasing  -has had several units of PRBC.  Would continue intermittent FFP to compensate for additional dilution    - Bleeding is likely associated with liver dysregulation and decreased factor production. Continue Vit K with TPN.  - FFP and PRBCs as needed. There is no goal INR so this will need to be done based on clinical judgement and if persistent hemoglobin decline       Thank you      Mary Sagastume MD  for Dr Reyes Villatoro   [phone]

## 2024-05-11 NOTE — PROGRESS NOTE ADULT - ASSESSMENT
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 Prolonged hospital course significant for recurrent AKIs, cholestatic transaminitis s/p perc cony, prior DVTs, PNAs, fungemia, sinus pauses s/p pacemaker and recurrent bleeding from fistula most recently s/p IR embolization of L branch of inferior epigastric artery via R groin access (4/16), with multiple readmissions for continued bleeding from fistula and ostomy sites. S/p ileoscopy on noting an ulcer w/ clot proximal to stoma w/ hemoclip was applied (5/1). Admitted for symptomatic anemia secondary to bleeding from fistula site and ileostomy sites. Transferred to SICU 5/8 overnight for 2L blood loss from fistula site with subsequent hypotension. Now s/p ileoscopy, noting erythematous patch of bowel just deep to the ECF, without any active bleeding (5/9). S/p IR angiography noting  several areas of hyperemia in the jejunum adjacent to the stoma however no evidence of active bleeding (5/9).     Tranfuse 2units of pRBCs  F/u post-transfusion CBC  OR plans pending  Rest of care per SICU

## 2024-05-11 NOTE — PROGRESS NOTE ADULT - SUBJECTIVE AND OBJECTIVE BOX
SUBJECTIVE:  Patient was evaluated at bedside this AM by general surgery team. Patient is frustrated by series of events and wants to know next steps in his medical plan. He denies dizziness, nausea, or emesis. He currently denies pain at this time.    MEDICATIONS  (STANDING):  albumin human 25% IVPB 50 milliLiter(s) IV Intermittent every 8 hours  albuterol/ipratropium for Nebulization 3 milliLiter(s) Nebulizer every 6 hours  atorvastatin 20 milliGRAM(s) Oral at bedtime  ceFAZolin   IVPB 2000 milliGRAM(s) IV Intermittent every 12 hours  chlorhexidine 2% Cloths 1 Application(s) Topical daily  cholestyramine Powder (Sugar-Free) 4 Gram(s) Oral every 24 hours  dextrose 10% Bolus 125 milliLiter(s) IV Bolus once  dextrose 5%. 1000 milliLiter(s) (100 mL/Hr) IV Continuous <Continuous>  dextrose 5%. 1000 milliLiter(s) (50 mL/Hr) IV Continuous <Continuous>  dextrose 50% Injectable 25 Gram(s) IV Push once  dextrose 50% Injectable 12.5 Gram(s) IV Push once  glucagon  Injectable 1 milliGRAM(s) IntraMuscular once  hydrocortisone Enema 100 milliGRAM(s) Rectal every 12 hours  insulin lispro (ADMELOG) corrective regimen sliding scale   SubCutaneous every 6 hours  levothyroxine Injectable 40 MICROGram(s) IV Push <User Schedule>  lipid, fat emulsion (Fish Oil and Plant Based) 20% Infusion 0.51 Gm/kG/Day (20.83 mL/Hr) IV Continuous <Continuous>  lipid, fat emulsion (Fish Oil and Plant Based) 20% Infusion 0.51 Gm/kG/Day (20.83 mL/Hr) IV Continuous <Continuous>  melatonin 5 milliGRAM(s) Oral at bedtime  pancrelipase  (CREON 24,000 Lipase Units) 1 Capsule(s) Oral three times a day with meals  Parenteral Nutrition - Adult 1 Each TPN Continuous <Continuous>  Parenteral Nutrition - Adult 1 Each TPN Continuous <Continuous>  venlafaxine XR. 300 milliGRAM(s) Oral daily    MEDICATIONS  (PRN):  dextrose Oral Gel 15 Gram(s) Oral once PRN Blood Glucose LESS THAN 70 milliGRAM(s)/deciliter      Vital Signs Last 24 Hrs  T(C): 36.6 (11 May 2024 09:00), Max: 36.7 (11 May 2024 05:08)  T(F): 97.9 (11 May 2024 09:00), Max: 98.1 (11 May 2024 05:08)  HR: 105 (11 May 2024 10:00) (91 - 127)  BP: 109/58 (11 May 2024 10:00) (92/52 - 116/54)  BP(mean): 77 (11 May 2024 10:00) (69 - 84)  RR: 29 (11 May 2024 10:00) (7 - 30)  SpO2: 91% (11 May 2024 10:00) (86% - 100%)    Parameters below as of 11 May 2024 10:00  Patient On (Oxygen Delivery Method): room air        Physical Exam:  General: NAD, resting comfortably in bed  Pulmonary: Nonlabored breathing, no respiratory distress  Cardiovascular: NSR  Abdominal: soft, NT/ND, midline fistula with clean appliance, ileostomy with no current output  Extremities: WWP, normal strength, bilateral pitting edema in lower extremities  Neuro: A/O x 3, CNs II-XII grossly intact, no focal deficits, normal motor/sensation  Pulses: palpable distal pulses    I&O's Summary    10 May 2024 07:01  -  11 May 2024 07:00  --------------------------------------------------------  IN: 3528.6 mL / OUT: 1810 mL / NET: 1718.6 mL    11 May 2024 07:01  -  11 May 2024 10:22  --------------------------------------------------------  IN: 801 mL / OUT: 445 mL / NET: 356 mL        LABS:                        7.0    18.01 )-----------( 73       ( 11 May 2024 05:30 )             19.7     05-11    136  |  102  |  90<H>  ----------------------------<  271<H>  4.2   |  24  |  2.29<H>    Ca    7.8<L>      11 May 2024 05:30  Phos  4.2     05-11  Mg     2.6     05-11    TPro  4.4<L>  /  Alb  2.3<L>  /  TBili  7.2<H>  /  DBili  5.4<H>  /  AST  207<H>  /  ALT  210<H>  /  AlkPhos  83  05-11    PT/INR - ( 11 May 2024 09:26 )   PT: 17.7 sec;   INR: 1.57          PTT - ( 11 May 2024 09:26 )  PTT:32.6 sec  Urinalysis Basic - ( 11 May 2024 05:30 )    Color: x / Appearance: x / SG: x / pH: x  Gluc: 271 mg/dL / Ketone: x  / Bili: x / Urobili: x   Blood: x / Protein: x / Nitrite: x   Leuk Esterase: x / RBC: x / WBC x   Sq Epi: x / Non Sq Epi: x / Bacteria: x      CAPILLARY BLOOD GLUCOSE      POCT Blood Glucose.: 262 mg/dL (10 May 2024 23:20)  POCT Blood Glucose.: 138 mg/dL (10 May 2024 18:40)  POCT Blood Glucose.: 154 mg/dL (10 May 2024 11:16)    LIVER FUNCTIONS - ( 11 May 2024 05:30 )  Alb: 2.3 g/dL / Pro: 4.4 g/dL / ALK PHOS: 83 U/L / ALT: 210 U/L / AST: 207 U/L / GGT: x             RADIOLOGY & ADDITIONAL STUDIES:

## 2024-05-11 NOTE — PROGRESS NOTE ADULT - SUBJECTIVE AND OBJECTIVE BOX
ON: ativan 0.5 x1 for sleep. 10pm lactate 3.3 (3.6). SOB, satting low 90s on 4L NC, given lasix 20mg IV x1. 2am lactate 2.9 (3.3). Am Hgb 7 (8.3), given 1 unit pRBC. Small amount of clots passed in the eakins pouch, no active bleeding noted. Am lactate 2.4 (2.9). Around 6:30am, approx 150cc of BRB from multiple areas beneath the fistula, hemostasis achieved w/ pressure. Additional 1 unit prbc ordered. Additional lasix 20mg x1 as per Dr. SMITH.       SUBJECTIVE: Pt seen and examined at bedside this am by ICU team. Patient is lying comfortably in bed. No acute complaints at this time.     MEDICATIONS  (STANDING):  albumin human 25% IVPB 50 milliLiter(s) IV Intermittent every 8 hours  albuterol/ipratropium for Nebulization 3 milliLiter(s) Nebulizer every 6 hours  atorvastatin 20 milliGRAM(s) Oral at bedtime  ceFAZolin   IVPB 2000 milliGRAM(s) IV Intermittent every 12 hours  chlorhexidine 2% Cloths 1 Application(s) Topical daily  cholestyramine Powder (Sugar-Free) 4 Gram(s) Oral every 24 hours  dextrose 10% Bolus 125 milliLiter(s) IV Bolus once  dextrose 5%. 1000 milliLiter(s) (100 mL/Hr) IV Continuous <Continuous>  dextrose 5%. 1000 milliLiter(s) (50 mL/Hr) IV Continuous <Continuous>  dextrose 50% Injectable 25 Gram(s) IV Push once  dextrose 50% Injectable 12.5 Gram(s) IV Push once  furosemide   Injectable 20 milliGRAM(s) IV Push daily  glucagon  Injectable 1 milliGRAM(s) IntraMuscular once  hydrocortisone Enema 100 milliGRAM(s) Rectal every 12 hours  insulin lispro (ADMELOG) corrective regimen sliding scale   SubCutaneous every 6 hours  levothyroxine Injectable 40 MICROGram(s) IV Push <User Schedule>  lipid, fat emulsion (Fish Oil and Plant Based) 20% Infusion 0.51 Gm/kG/Day (20.83 mL/Hr) IV Continuous <Continuous>  melatonin 5 milliGRAM(s) Oral at bedtime  pancrelipase  (CREON 24,000 Lipase Units) 1 Capsule(s) Oral three times a day with meals  Parenteral Nutrition - Adult 1 Each TPN Continuous <Continuous>  venlafaxine XR. 300 milliGRAM(s) Oral daily    MEDICATIONS  (PRN):  dextrose Oral Gel 15 Gram(s) Oral once PRN Blood Glucose LESS THAN 70 milliGRAM(s)/deciliter      Drips: TPN    ICU Vital Signs Last 24 Hrs  T(C): 36.7 (11 May 2024 05:08), Max: 36.7 (11 May 2024 05:08)  T(F): 98.1 (11 May 2024 05:08), Max: 98.1 (11 May 2024 05:08)  HR: 92 (11 May 2024 07:00) (87 - 127)  BP: 98/53 (11 May 2024 07:00) (92/52 - 116/54)  BP(mean): 70 (11 May 2024 07:00) (69 - 84)  ABP: 103/55 (10 May 2024 20:00) (89/40 - 118/48)  ABP(mean): 73 (10 May 2024 20:00) (56 - 85)  RR: 16 (11 May 2024 07:00) (7 - 30)  SpO2: 100% (11 May 2024 07:00) (86% - 100%)    O2 Parameters below as of 11 May 2024 07:00  Patient On (Oxygen Delivery Method): BiPAP/CPAP    O2 Concentration (%): 50        Physical Exam:  General: Resting comfortably in bed, NAD  Neuro: A&Ox3, no focal deficits  HEENT: MMM  Respiratory: non labored breathing, no respiratory distress. Lungs clear to auscultation with diminished sounds at bases  Cardiovascular: Irregularly irregular  Gastrointestinal: soft, NTND abdomen. Midline ECF with bilio-sanguinous output, with granulation tissue surrounding. Ileostomy with minimal output.  Genitourinary: Po[place] with yellow urine.   Extremities: 3+ pitting edema in BLE. WWP.   Skin: jaundiced.   Lines: R IJ TLC present. L Radial A line.    I&O's Summary    10 May 2024 07:01  -  11 May 2024 07:00  --------------------------------------------------------  IN: 3528.6 mL / OUT: 1810 mL / NET: 1718.6 mL    11 May 2024 07:01  -  11 May 2024 07:29  --------------------------------------------------------  IN: 167 mL / OUT: 0 mL / NET: 167 mL        LABS:                        7.0    18.01 )-----------( 73       ( 11 May 2024 05:30 )             19.7     05-11    136  |  102  |  90<H>  ----------------------------<  271<H>  4.2   |  24  |  2.29<H>    Ca    7.8<L>      11 May 2024 05:30  Phos  4.2     05-11  Mg     2.6     05-11    TPro  4.4<L>  /  Alb  2.3<L>  /  TBili  7.2<H>  /  DBili  5.4<H>  /  AST  207<H>  /  ALT  210<H>  /  AlkPhos  83  05-11    PT/INR - ( 09 May 2024 22:20 )   PT: 17.2 sec;   INR: 1.53          PTT - ( 09 May 2024 22:20 )  PTT:30.6 sec  Urinalysis Basic - ( 11 May 2024 05:30 )    Color: x / Appearance: x / SG: x / pH: x  Gluc: 271 mg/dL / Ketone: x  / Bili: x / Urobili: x   Blood: x / Protein: x / Nitrite: x   Leuk Esterase: x / RBC: x / WBC x   Sq Epi: x / Non Sq Epi: x / Bacteria: x      CAPILLARY BLOOD GLUCOSE      POCT Blood Glucose.: 262 mg/dL (10 May 2024 23:20)  POCT Blood Glucose.: 138 mg/dL (10 May 2024 18:40)  POCT Blood Glucose.: 154 mg/dL (10 May 2024 11:16)    LIVER FUNCTIONS - ( 11 May 2024 05:30 )  Alb: 2.3 g/dL / Pro: 4.4 g/dL / ALK PHOS: 83 U/L / ALT: 210 U/L / AST: 207 U/L / GGT: x              ON: ativan 0.5 x1 for sleep. 10pm lactate 3.3 (3.6). SOB, satting low 90s on 4L NC, given lasix 20mg IV x1. 2am lactate 2.9 (3.3). Am Hgb 7 (8.3), given 1 unit pRBC. Small amount of clots passed in the eakins pouch, no active bleeding noted. Am lactate 2.4 (2.9). Around 6:30am, approx 150cc of BRB from multiple areas beneath the fistula, hemostasis achieved w/ pressure. Additional 1 unit prbc ordered. Additional lasix 20mg x1 as per Dr. SMITH.       SUBJECTIVE: Pt seen and examined at bedside this am by ICU team. Patient is lying comfortably in bed. Still endorses shortness of breath.     MEDICATIONS  (STANDING):  albumin human 25% IVPB 50 milliLiter(s) IV Intermittent every 8 hours  albuterol/ipratropium for Nebulization 3 milliLiter(s) Nebulizer every 6 hours  atorvastatin 20 milliGRAM(s) Oral at bedtime  ceFAZolin   IVPB 2000 milliGRAM(s) IV Intermittent every 12 hours  chlorhexidine 2% Cloths 1 Application(s) Topical daily  cholestyramine Powder (Sugar-Free) 4 Gram(s) Oral every 24 hours  dextrose 10% Bolus 125 milliLiter(s) IV Bolus once  dextrose 5%. 1000 milliLiter(s) (100 mL/Hr) IV Continuous <Continuous>  dextrose 5%. 1000 milliLiter(s) (50 mL/Hr) IV Continuous <Continuous>  dextrose 50% Injectable 25 Gram(s) IV Push once  dextrose 50% Injectable 12.5 Gram(s) IV Push once  furosemide   Injectable 20 milliGRAM(s) IV Push daily  glucagon  Injectable 1 milliGRAM(s) IntraMuscular once  hydrocortisone Enema 100 milliGRAM(s) Rectal every 12 hours  insulin lispro (ADMELOG) corrective regimen sliding scale   SubCutaneous every 6 hours  levothyroxine Injectable 40 MICROGram(s) IV Push <User Schedule>  lipid, fat emulsion (Fish Oil and Plant Based) 20% Infusion 0.51 Gm/kG/Day (20.83 mL/Hr) IV Continuous <Continuous>  melatonin 5 milliGRAM(s) Oral at bedtime  pancrelipase  (CREON 24,000 Lipase Units) 1 Capsule(s) Oral three times a day with meals  Parenteral Nutrition - Adult 1 Each TPN Continuous <Continuous>  venlafaxine XR. 300 milliGRAM(s) Oral daily    MEDICATIONS  (PRN):  dextrose Oral Gel 15 Gram(s) Oral once PRN Blood Glucose LESS THAN 70 milliGRAM(s)/deciliter      Drips: TPN    ICU Vital Signs Last 24 Hrs  T(C): 36.7 (11 May 2024 05:08), Max: 36.7 (11 May 2024 05:08)  T(F): 98.1 (11 May 2024 05:08), Max: 98.1 (11 May 2024 05:08)  HR: 92 (11 May 2024 07:00) (87 - 127)  BP: 98/53 (11 May 2024 07:00) (92/52 - 116/54)  BP(mean): 70 (11 May 2024 07:00) (69 - 84)  ABP: 103/55 (10 May 2024 20:00) (89/40 - 118/48)  ABP(mean): 73 (10 May 2024 20:00) (56 - 85)  RR: 16 (11 May 2024 07:00) (7 - 30)  SpO2: 100% (11 May 2024 07:00) (86% - 100%)    O2 Parameters below as of 11 May 2024 07:00  Patient On (Oxygen Delivery Method): BiPAP/CPAP    O2 Concentration (%): 50        Physical Exam:  General: Resting comfortably in bed, NAD  Neuro: A&Ox3, no focal deficits  HEENT: MMM  Respiratory: non labored breathing, no respiratory distress. Lungs clear to auscultation with diminished sounds at bases  Cardiovascular: Irregularly irregular  Gastrointestinal: soft, NTND abdomen. Midline ECF with bilio-sanguinous output, with granulation tissue surrounding. Ileostomy with minimal output.  Genitourinary: [place] with yellow urine.   Extremities: 3+ pitting edema in BLE. WWP.   Skin: jaundiced.   Lines: R IJ TLC present. L Radial A line.    I&O's Summary    10 May 2024 07:01  -  11 May 2024 07:00  --------------------------------------------------------  IN: 3528.6 mL / OUT: 1810 mL / NET: 1718.6 mL    11 May 2024 07:01  -  11 May 2024 07:29  --------------------------------------------------------  IN: 167 mL / OUT: 0 mL / NET: 167 mL        LABS:                        7.0    18.01 )-----------( 73       ( 11 May 2024 05:30 )             19.7     05-11    136  |  102  |  90<H>  ----------------------------<  271<H>  4.2   |  24  |  2.29<H>    Ca    7.8<L>      11 May 2024 05:30  Phos  4.2     05-11  Mg     2.6     05-11    TPro  4.4<L>  /  Alb  2.3<L>  /  TBili  7.2<H>  /  DBili  5.4<H>  /  AST  207<H>  /  ALT  210<H>  /  AlkPhos  83  05-11    PT/INR - ( 09 May 2024 22:20 )   PT: 17.2 sec;   INR: 1.53          PTT - ( 09 May 2024 22:20 )  PTT:30.6 sec  Urinalysis Basic - ( 11 May 2024 05:30 )    Color: x / Appearance: x / SG: x / pH: x  Gluc: 271 mg/dL / Ketone: x  / Bili: x / Urobili: x   Blood: x / Protein: x / Nitrite: x   Leuk Esterase: x / RBC: x / WBC x   Sq Epi: x / Non Sq Epi: x / Bacteria: x      CAPILLARY BLOOD GLUCOSE      POCT Blood Glucose.: 262 mg/dL (10 May 2024 23:20)  POCT Blood Glucose.: 138 mg/dL (10 May 2024 18:40)  POCT Blood Glucose.: 154 mg/dL (10 May 2024 11:16)    LIVER FUNCTIONS - ( 11 May 2024 05:30 )  Alb: 2.3 g/dL / Pro: 4.4 g/dL / ALK PHOS: 83 U/L / ALT: 210 U/L / AST: 207 U/L / GGT: x

## 2024-05-11 NOTE — PROGRESS NOTE ADULT - SUBJECTIVE AND OBJECTIVE BOX
bleed on the left side  ileostomy bag is clean when i examined him  sleepy, on CPAP  continue steroid enemas BID  antibiotics for staphylococcus bacteremia

## 2024-05-11 NOTE — PROGRESS NOTE ADULT - SUBJECTIVE AND OBJECTIVE BOX
79 yo man with a history of AFib/Flutter s/p DCCV, Crohn's, prior ileocecostomy and open appendectomy, extensive abdominal surgeries and ileostomy presents with persistent anemia     He has had blood transfusions in the past however anemia is ongoing. History of blood loss in the ostomy. Currently with ongoing bleeding from the ostomy.   Additional bleeding overnight  received 2nd unit this morning  receiving albumin  denies pain    MEDICATIONS  (STANDING):  albumin human 25% IVPB 50 milliLiter(s) IV Intermittent every 8 hours  albuterol/ipratropium for Nebulization 3 milliLiter(s) Nebulizer every 6 hours  atorvastatin 20 milliGRAM(s) Oral at bedtime  bacitracin   Ointment 1 Application(s) Topical two times a day  ceFAZolin   IVPB 2000 milliGRAM(s) IV Intermittent every 12 hours  chlorhexidine 2% Cloths 1 Application(s) Topical daily  cholestyramine Powder (Sugar-Free) 4 Gram(s) Oral every 24 hours  dextrose 10% Bolus 125 milliLiter(s) IV Bolus once  dextrose 5%. 1000 milliLiter(s) (100 mL/Hr) IV Continuous <Continuous>  dextrose 5%. 1000 milliLiter(s) (50 mL/Hr) IV Continuous <Continuous>  dextrose 50% Injectable 25 Gram(s) IV Push once  dextrose 50% Injectable 12.5 Gram(s) IV Push once  glucagon  Injectable 1 milliGRAM(s) IntraMuscular once  hydrocortisone Enema 100 milliGRAM(s) Rectal every 12 hours  insulin lispro (ADMELOG) corrective regimen sliding scale   SubCutaneous every 6 hours  levothyroxine Injectable 40 MICROGram(s) IV Push <User Schedule>  lipid, fat emulsion (Fish Oil and Plant Based) 20% Infusion 0.51 Gm/kG/Day (20.83 mL/Hr) IV Continuous <Continuous>  melatonin 5 milliGRAM(s) Oral at bedtime  pancrelipase  (CREON 24,000 Lipase Units) 1 Capsule(s) Oral three times a day with meals  Parenteral Nutrition - Adult 1 Each TPN Continuous <Continuous>  Parenteral Nutrition - Adult 1 Each TPN Continuous <Continuous>  venlafaxine XR. 300 milliGRAM(s) Oral daily    MEDICATIONS  (PRN):  dextrose Oral Gel 15 Gram(s) Oral once PRN Blood Glucose LESS THAN 70 milliGRAM(s)/deciliter      ICU Vital Signs Last 24 Hrs  T(C): 36.6 (11 May 2024 09:00), Max: 36.7 (11 May 2024 05:08)  T(F): 97.9 (11 May 2024 09:00), Max: 98.1 (11 May 2024 05:08)  HR: 105 (11 May 2024 10:00) (91 - 127)  BP: 109/58 (11 May 2024 10:00) (92/52 - 116/54)  BP(mean): 77 (11 May 2024 10:00) (69 - 84)  ABP: 88/79 (11 May 2024 10:00) (61/52 - 118/48)  ABP(mean): 83 (11 May 2024 10:00) (56 - 85)  RR: 29 (11 May 2024 10:00) (7 - 30)  SpO2: 91% (11 May 2024 10:00) (86% - 100%)    O2 Parameters below as of 11 May 2024 10:00  Patient On (Oxygen Delivery Method): room air      Physical Exam:    Gen:  AAOx3  Skin:  No jaundice, scattered ecchymosis  Lungs no accessory use  ABD Ileostomy present, stoma is pink and appears healthy. Blood in ostomy bag   Bruising noted on bilateral arms and lower extremities   EXT; No c/c/e                                     7.0    18.01 )-----------( 73       ( 11 May 2024 05:30 )             19.7   CBC Full  -  ( 11 May 2024 05:30 )  WBC Count : 18.01 K/uL  RBC Count : 2.26 M/uL  Hemoglobin : 7.0 g/dL  Hematocrit : 19.7 %  Platelet Count - Automated : 73 K/uL  Mean Cell Volume : 87.2 fl  Mean Cell Hemoglobin : 31.0 pg  Mean Cell Hemoglobin Concentration : 35.5 gm/dL  Auto Neutrophil # : 16.12 K/uL  Auto Lymphocyte # : 0.63 K/uL  Auto Monocyte # : 1.26 K/uL  Auto Eosinophil # : 0.00 K/uL  Auto Basophil # : 0.00 K/uL  Auto Neutrophil % : 89.5 %  Auto Lymphocyte % : 3.5 %  Auto Monocyte % : 7.0 %  Auto Eosinophil % : 0.0 %  Auto Basophil % : 0.0 %    05-11    136  |  102  |  90<H>  ----------------------------<  271<H>  4.2   |  24  |  2.29<H>    Ca    7.8<L>      11 May 2024 05:30  Phos  4.2     05-11  Mg     2.6     05-11    TPro  4.4<L>  /  Alb  2.3<L>  /  TBili  7.2<H>  /  DBili  5.4<H>  /  AST  207<H>  /  ALT  210<H>  /  AlkPhos  83  05-11

## 2024-05-11 NOTE — PROGRESS NOTE ADULT - SUBJECTIVE AND OBJECTIVE BOX
HD # 6  5 [facility]  (covering for Dr. Peterson)    Patient admitted for apparent GI bleeding from distal ileum (most likely) via ileostomy (through which little sucus drains).  Has received 10 units of blood during this admission.  Had ileoscopy this admission and possible mucosal bleeding site was clipped (did not stay in place for long).  Overnight had decrease in Hgb to 7 which is prompting transfusion x 2 this AM.      Patient is awake and alert.  No pain.  Is appropriately anxious.    Vital Signs Last 24 Hrs  T(C): 36.6 (11 May 2024 09:00), Max: 36.7 (11 May 2024 05:08)  T(F): 97.9 (11 May 2024 09:00), Max: 98.1 (11 May 2024 05:08)  HR: 105 (11 May 2024 10:00) (91 - 127)  BP: 109/58 (11 May 2024 10:00) (92/52 - 116/54)  BP(mean): 77 (11 May 2024 10:00) (69 - 84)  RR: 29 (11 May 2024 10:00) (7 - 30)  SpO2: 91% (11 May 2024 10:00) (86% - 100%)    Parameters below as of 11 May 2024 10:00  Patient On (Oxygen Delivery Method): room air  No pressors    lungs: clear with decreased BS at bases  abd/trunk: edematous 4+.  Ileostomy appliance is empty.  no bleeding evident now  central wound manage in place and there is minimal sucus drainiing.  minor sanguinous fluid around rim of wound -not much.  ext: 4+ edema and thickening and widening of legs and trunk    UO  965 ml/last shift; 1,510 ml/last 24 hrs  fistula:  300 ml/24 hrs  ileostomy: no output recorded x 24 hrs    TPN total about 2,600 ml/24hr  PRBC's ongoing transfusions                          7.0    18.01 )-----------( 73       ( 11 May 2024 05:30 )             19.7   05-11    136  |  102  |  90<H>  ----------------------------<  271<H>  4.2   |  24  |  2.29<H>    Ca    7.8<L>      11 May 2024 05:30  Phos  4.2     05-11  Mg     2.6     05-11    TPro  4.4<L>  /  Alb  2.3<L>  /  TBili  7.2<H>  /  DBili  5.4<H>  /  AST  207<H>  /  ALT  210<H>  /  AlkPhos  83  05-11    INR 1.57  PT 17.7  APTT 32.6

## 2024-05-11 NOTE — PROGRESS NOTE ADULT - ASSESSMENT
A/P  Exceedingly complex patient.  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  Admitted 6 days ago for apparent GI bleeding from distal ileum (most likely) via ileostomy (through which little sucus drains).  Has received 10 units of blood during this admission.  Had ileoscopy this admission and possible mucosal bleeding site was clipped (did not stay in place for long).  Overnight had decrease in Hgb to 7 which is prompting transfusion x 2 this AM.      Current plans are for urgent IR attempted embolization of ileal bleeding point (not definitively identified thus are) on Monday morning.  Also, Dr. Hernandes (together with Dr. Peterson) has tentatively planned an exploratory laparotomy and possible takedown of fistula and possible SB resection to resect the bleeding segment.  Indication is ongoing bleeding and peristent transfusion requirement.    Presently, patient is hemodynamically stable.  No active significant bleeding presently from ileostomy or from midline wound.  Body edema is impressive and there are some mild flank ecchymoses.    Not taking much po and thus the enteric fistula ouptut is low  Continues on TPN with some po intake - not much.  Will recheck H/H after second unit given this AM. Hgb had been 7 prior to these 2 units today.  Hopefully, will stabilize such that above IR plan for Monday procedure can occur as scheduled.    I have spoken with ICU team, Dr. Cook and Greta today.

## 2024-05-11 NOTE — PROGRESS NOTE ADULT - ASSESSMENT
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 Prolonged hospital course significant for recurrent AKIs, cholestatic transaminitis s/p perc cony, prior DVTs, PNAs, fungemia, sinus pauses s/p pacemaker and recurrent bleeding from fistula most recently s/p IR embolization of L branch of inferior epigastric artery via R groin access (4/16), with multiple readmissions for continued bleeding from fistula and ostomy sites. S/p ileoscopy on noting an ulcer w/ clot proximal to stoma w/ hemoclip was applied (5/1). Admitted for symptomatic anemia secondary to bleeding from fistula site and ileostomy sites. Transferred to SICU 5/8 overnight for 2L blood loss from fistula site with subsequent hypotension. Now s/p ileoscopy, noting erythematous patch of bowel just deep to the ECF, without any active bleeding (5/9). S/p IR angiography noting  several areas of hyperemia in the jejunum adjacent to the stoma however no evidence of active bleeding (5/9).     Neuro: Dilaudid prn for pain. Hx: Anxiety: Continue Effexor. Ambien on hold   CV: Hemorrhagic shock on 5/8, 2/2 EC Fistula bleed, now resolved. 2L of bright red blood. S/p total of 3 units of PRBCs (overnight 5/8), responded appropriately 8.3 (5). Keep MAPs > 65. Lactate 6.1 -->4.0 ---> 2.8. Hx: AF: Metoprolol on hold 2/2 hypotension HLD: Continue Atorvastatin   Pulm: Satting well on RA, nightly BIPAP at 40%.   GI: CLD. IR 5/9: segmental hyperemia, unable to embolize c/f bowel ischemia. Ileoscopy 5/9:erythematous patch of bowel just deep to the ECF, without active bleeding. CT 5/2 with bowel wall thickening. Holding methylprednisolone IV, started hydrocortisone enemas 5/10 as per GI. Continue TPN. Will hold off on remicade in setting of MSSA bacteremia.  : Voids. ELVI likely from acute blood loss. Creatinine 2.07. Baseline 1.2 - 1.4. Urine Studies/Cystatin C pending.   ID: Bacteremia 2/2 MSSA grown in BCx (5/9). Ancef (5/10- ) d/c: // Cefepime (5/10), Vanc x1 (5/9)  Endo: mISSc. Hx: Hypothyroidism. Continue synthroid.   Heme: Anemia 2/2 EC fistula bleed. Received a total of 9 units of PRBCs, 1 FFP, 1 plt this admission. Trend CBC [id number]   Lines: RIJ TLC (5/9--) L. Radial A-line (5/9--) d/c: // PICC (X-5/10)  PPx: SCDs  Dispo: SICU 78 year old male with past medical history of AFib/Flutter 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 Prolonged hospital course significant for recurrent AKIs, cholestatic transaminitis s/p perc cony, prior DVTs, PNAs, fungemia, sinus pauses s/p pacemaker and recurrent bleeding from fistula most recently s/p IR embolization of L branch of inferior epigastric artery via R groin access (4/16), with multiple readmissions for continued bleeding from fistula and ostomy sites. S/p ileoscopy on noting an ulcer w/ clot proximal to stoma w/ hemoclip was applied (5/1). Admitted for symptomatic anemia secondary to bleeding from fistula site and ileostomy sites. Transferred to SICU 5/8 overnight for 2L blood loss from fistula site with subsequent hypotension. Now s/p ileoscopy, noting erythematous patch of bowel just deep to the ECF, without any active bleeding (5/9). S/p IR angiography noting  several areas of hyperemia in the jejunum adjacent to the stoma however no evidence of active bleeding (5/9).     Neuro: Dilaudid PRNfor pain. Hx: Anxiety: Continue Effexor, Ativan PRN. Ambien on hold   CV: Hemorrhagic shock on 5/8, 2/2 EC Fistula bleed, now resolved. 2L of bright red blood. s/p PRBCs. Goal maintain MAPs > 65. Lactate 6.1 -->4.0 ---> 2.8, will trend to clear. Hx: AF: Metoprolol on hold 2/2 hypotension HLD: Continue Atorvastatin   Pulm: B/L effusions - unchanged. Nightly BIPAP at 40%.   GI: CLD. IR 5/9: segmental hyperemia, unable to embolize c/f bowel ischemia. Ileoscopy 5/9: erythematous patch of bowel just deep to the ECF, without active bleeding. CT 5/2 with bowel wall thickening. Holding methylprednisolone IV, started hydrocortisone enemas 5/10 as per GI. Continue TPN. Will hold off on remicade in setting of MSSA bacteremia. Pending IR on Monday  : [place] for strict Is and Os. ELVI likely from acute blood loss and contrast load (bl 1.2 - 1.4) - Cr improving this AM.   ID: Bacteremia 2/2 MSSA grown in BCx (5/9). Ancef (5/10- ) d/c: // Cefepime (5/10), Vanc x1 (5/9)  Endo: mISSc. Hx: Hypothyroidism. Continue synthroid.   Heme: Anemia 2/2 EC fistula bleed. Received a total of 10 units of PRBCs, 2 FFP, 1 plt this admission. Will give 1 FFP for INR 1.57  Lines: RIJ TLC (5/9--) L. Radial A-line (5/9--) d/c: // PICC (X-5/10)  PPx: SCDs  Dispo: SICU

## 2024-05-12 NOTE — PROGRESS NOTE ADULT - ASSESSMENT
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 Prolonged hospital course significant for recurrent AKIs, cholestatic transaminitis s/p perc cony, prior DVTs, PNAs, fungemia, sinus pauses s/p pacemaker and recurrent bleeding from fistula most recently s/p IR embolization of L branch of inferior epigastric artery via R groin access (4/16), with multiple readmissions for continued bleeding from fistula and ostomy sites. S/p ileoscopy on noting an ulcer w/ clot proximal to stoma w/ hemoclip was applied (5/1). Admitted for symptomatic anemia secondary to bleeding from fistula site and ileostomy sites. Transferred to SICU 5/8 overnight for 2L blood loss from fistula site with subsequent hypotension. Now s/p ileoscopy, noting erythematous patch of bowel just deep to the ECF, without any active bleeding (5/9). S/p IR angiography noting  several areas of hyperemia in the jejunum adjacent to the stoma however no evidence of active bleeding (5/9).     Tranfuse 1 unit of pRBCs  F/u post-transfusion CBC  OR plans pending  IR consult   Rest of care per SICU

## 2024-05-12 NOTE — PROGRESS NOTE ADULT - SUBJECTIVE AND OBJECTIVE BOX
Overnight events: INCOMPLETE      POD#    SUBJECTIVE:      MEDICATIONS  (STANDING):  albumin human 25% IVPB 50 milliLiter(s) IV Intermittent every 8 hours  albuterol/ipratropium for Nebulization 3 milliLiter(s) Nebulizer every 6 hours  atorvastatin 20 milliGRAM(s) Oral at bedtime  bacitracin   Ointment 1 Application(s) Topical two times a day  ceFAZolin   IVPB 2000 milliGRAM(s) IV Intermittent every 12 hours  chlorhexidine 2% Cloths 1 Application(s) Topical daily  cholestyramine Powder (Sugar-Free) 4 Gram(s) Oral every 24 hours  dextrose 10% Bolus 125 milliLiter(s) IV Bolus once  dextrose 5%. 1000 milliLiter(s) (100 mL/Hr) IV Continuous <Continuous>  dextrose 5%. 1000 milliLiter(s) (50 mL/Hr) IV Continuous <Continuous>  dextrose 50% Injectable 25 Gram(s) IV Push once  dextrose 50% Injectable 12.5 Gram(s) IV Push once  glucagon  Injectable 1 milliGRAM(s) IntraMuscular once  hydrocortisone Enema 100 milliGRAM(s) Rectal every 12 hours  insulin lispro (ADMELOG) corrective regimen sliding scale   SubCutaneous every 6 hours  levothyroxine Injectable 40 MICROGram(s) IV Push <User Schedule>  lipid, fat emulsion (Fish Oil and Plant Based) 20% Infusion 0.51 Gm/kG/Day (20.83 mL/Hr) IV Continuous <Continuous>  melatonin 5 milliGRAM(s) Oral at bedtime  pancrelipase  (CREON 24,000 Lipase Units) 1 Capsule(s) Oral three times a day with meals  Parenteral Nutrition - Adult 1 Each TPN Continuous <Continuous>  venlafaxine XR. 300 milliGRAM(s) Oral daily    MEDICATIONS  (PRN):  dextrose Oral Gel 15 Gram(s) Oral once PRN Blood Glucose LESS THAN 70 milliGRAM(s)/deciliter      Vital Signs Last 24 Hrs  T(C): 36.2 (12 May 2024 05:09), Max: 36.6 (11 May 2024 09:00)  T(F): 97.2 (12 May 2024 05:09), Max: 97.9 (11 May 2024 09:00)  HR: 94 (12 May 2024 05:00) (92 - 105)  BP: 107/54 (12 May 2024 05:00) (97/50 - 135/63)  BP(mean): 76 (12 May 2024 05:00) (70 - 90)  RR: 18 (12 May 2024 05:00) (12 - 29)  SpO2: 100% (12 May 2024 05:00) (90% - 100%)    Parameters below as of 12 May 2024 05:00  Patient On (Oxygen Delivery Method): BiPAP/CPAP    O2 Concentration (%): 50    Physical Exam:  General: NAD, resting comfortably in bed  Pulmonary: Nonlabored breathing, no respiratory distress  Cardiovascular: NSR  Abdominal: soft, NT/ND  Extremities: WWP, normal strength  Neuro: A/O x 3, CNs II-XII grossly intact, no focal deficits, normal motor/sensation  Pulses: palpable distal pulses    I&O's Summary    10 May 2024 07:01  -  11 May 2024 07:00  --------------------------------------------------------  IN: 3528.6 mL / OUT: 1810 mL / NET: 1718.6 mL    11 May 2024 07:01  -  12 May 2024 05:58  --------------------------------------------------------  IN: 4669 mL / OUT: 2950 mL / NET: 1719 mL        LABS:                        8.2    18.72 )-----------( 86       ( 11 May 2024 19:43 )             24.4     05-11    139  |  100  |  90<H>  ----------------------------<  273<H>  3.5   |  27  |  2.17<H>    Ca    8.4      11 May 2024 19:43  Phos  4.2     05-11  Mg     2.6     05-11    TPro  5.2<L>  /  Alb  2.8<L>  /  TBili  8.2<H>  /  DBili  x   /  AST  260<H>  /  ALT  223<H>  /  AlkPhos  101  05-11    PT/INR - ( 11 May 2024 19:43 )   PT: 15.7 sec;   INR: 1.39          PTT - ( 11 May 2024 19:43 )  PTT:29.5 sec  Urinalysis Basic - ( 11 May 2024 19:43 )    Color: x / Appearance: x / SG: x / pH: x  Gluc: 273 mg/dL / Ketone: x  / Bili: x / Urobili: x   Blood: x / Protein: x / Nitrite: x   Leuk Esterase: x / RBC: x / WBC x   Sq Epi: x / Non Sq Epi: x / Bacteria: x      CAPILLARY BLOOD GLUCOSE      POCT Blood Glucose.: 213 mg/dL (11 May 2024 23:14)  POCT Blood Glucose.: 147 mg/dL (11 May 2024 18:12)  POCT Blood Glucose.: 188 mg/dL (11 May 2024 11:27)    LIVER FUNCTIONS - ( 11 May 2024 19:43 )  Alb: 2.8 g/dL / Pro: 5.2 g/dL / ALK PHOS: 101 U/L / ALT: 223 U/L / AST: 260 U/L / GGT: x             RADIOLOGY & ADDITIONAL STUDIES:   Overnight events: 8 PM labs Hb stable at 8., plts up to 86, fibrinogen 136 and INR 1.39. a-line non-functional - d/c'ed. Additional 20 IVP lasix given at MN. AM Hb 7.8 - given 1 u PRBC.       POD# 5/9: IR - segemental hyperemia, unable to embolize 2/2 concern for bowel ischemia     SUBJECTIVE: Patient seen at bedside with chief resident.       MEDICATIONS  (STANDING):  albumin human 25% IVPB 50 milliLiter(s) IV Intermittent every 8 hours  albuterol/ipratropium for Nebulization 3 milliLiter(s) Nebulizer every 6 hours  atorvastatin 20 milliGRAM(s) Oral at bedtime  bacitracin   Ointment 1 Application(s) Topical two times a day  ceFAZolin   IVPB 2000 milliGRAM(s) IV Intermittent every 12 hours  chlorhexidine 2% Cloths 1 Application(s) Topical daily  cholestyramine Powder (Sugar-Free) 4 Gram(s) Oral every 24 hours  dextrose 10% Bolus 125 milliLiter(s) IV Bolus once  dextrose 5%. 1000 milliLiter(s) (100 mL/Hr) IV Continuous <Continuous>  dextrose 5%. 1000 milliLiter(s) (50 mL/Hr) IV Continuous <Continuous>  dextrose 50% Injectable 25 Gram(s) IV Push once  dextrose 50% Injectable 12.5 Gram(s) IV Push once  glucagon  Injectable 1 milliGRAM(s) IntraMuscular once  hydrocortisone Enema 100 milliGRAM(s) Rectal every 12 hours  insulin lispro (ADMELOG) corrective regimen sliding scale   SubCutaneous every 6 hours  levothyroxine Injectable 40 MICROGram(s) IV Push <User Schedule>  lipid, fat emulsion (Fish Oil and Plant Based) 20% Infusion 0.51 Gm/kG/Day (20.83 mL/Hr) IV Continuous <Continuous>  melatonin 5 milliGRAM(s) Oral at bedtime  pancrelipase  (CREON 24,000 Lipase Units) 1 Capsule(s) Oral three times a day with meals  Parenteral Nutrition - Adult 1 Each TPN Continuous <Continuous>  venlafaxine XR. 300 milliGRAM(s) Oral daily    MEDICATIONS  (PRN):  dextrose Oral Gel 15 Gram(s) Oral once PRN Blood Glucose LESS THAN 70 milliGRAM(s)/deciliter      Vital Signs Last 24 Hrs  T(C): 36.2 (12 May 2024 05:09), Max: 36.6 (11 May 2024 09:00)  T(F): 97.2 (12 May 2024 05:09), Max: 97.9 (11 May 2024 09:00)  HR: 94 (12 May 2024 05:00) (92 - 105)  BP: 107/54 (12 May 2024 05:00) (97/50 - 135/63)  BP(mean): 76 (12 May 2024 05:00) (70 - 90)  RR: 18 (12 May 2024 05:00) (12 - 29)  SpO2: 100% (12 May 2024 05:00) (90% - 100%)    Parameters below as of 12 May 2024 05:00  Patient On (Oxygen Delivery Method): BiPAP/CPAP    O2 Concentration (%): 50    Physical Exam:  General: NAD, resting comfortably in bed  Pulmonary: Nonlabored breathing, no respiratory distress  Cardiovascular: NSR  Abdominal: soft, NT/ND  Extremities: WWP, normal strength  Neuro: A/O x 3, CNs II-XII grossly intact, no focal deficits, normal motor/sensation  Pulses: palpable distal pulses    I&O's Summary    10 May 2024 07:01  -  11 May 2024 07:00  --------------------------------------------------------  IN: 3528.6 mL / OUT: 1810 mL / NET: 1718.6 mL    11 May 2024 07:01  -  12 May 2024 05:58  --------------------------------------------------------  IN: 4669 mL / OUT: 2950 mL / NET: 1719 mL        LABS:                        8.2    18.72 )-----------( 86       ( 11 May 2024 19:43 )             24.4     05-11    139  |  100  |  90<H>  ----------------------------<  273<H>  3.5   |  27  |  2.17<H>    Ca    8.4      11 May 2024 19:43  Phos  4.2     05-11  Mg     2.6     05-11    TPro  5.2<L>  /  Alb  2.8<L>  /  TBili  8.2<H>  /  DBili  x   /  AST  260<H>  /  ALT  223<H>  /  AlkPhos  101  05-11    PT/INR - ( 11 May 2024 19:43 )   PT: 15.7 sec;   INR: 1.39          PTT - ( 11 May 2024 19:43 )  PTT:29.5 sec  Urinalysis Basic - ( 11 May 2024 19:43 )    Color: x / Appearance: x / SG: x / pH: x  Gluc: 273 mg/dL / Ketone: x  / Bili: x / Urobili: x   Blood: x / Protein: x / Nitrite: x   Leuk Esterase: x / RBC: x / WBC x   Sq Epi: x / Non Sq Epi: x / Bacteria: x      CAPILLARY BLOOD GLUCOSE      POCT Blood Glucose.: 213 mg/dL (11 May 2024 23:14)  POCT Blood Glucose.: 147 mg/dL (11 May 2024 18:12)  POCT Blood Glucose.: 188 mg/dL (11 May 2024 11:27)    LIVER FUNCTIONS - ( 11 May 2024 19:43 )  Alb: 2.8 g/dL / Pro: 5.2 g/dL / ALK PHOS: 101 U/L / ALT: 223 U/L / AST: 260 U/L / GGT: x             RADIOLOGY & ADDITIONAL STUDIES:   Overnight events: 8 PM labs Hb stable at 8., plts up to 86, fibrinogen 136 and INR 1.39. a-line non-functional - d/c'ed. Additional 20 IVP lasix given at MN. AM Hb 7.8 - given 1 u PRBC.       POD# 5/9: IR - segemental hyperemia, unable to embolize 2/2 concern for bowel ischemia     SUBJECTIVE: Patient seen at bedside with chief resident.       MEDICATIONS  (STANDING):  albumin human 25% IVPB 50 milliLiter(s) IV Intermittent every 8 hours  albuterol/ipratropium for Nebulization 3 milliLiter(s) Nebulizer every 6 hours  atorvastatin 20 milliGRAM(s) Oral at bedtime  bacitracin   Ointment 1 Application(s) Topical two times a day  ceFAZolin   IVPB 2000 milliGRAM(s) IV Intermittent every 12 hours  chlorhexidine 2% Cloths 1 Application(s) Topical daily  cholestyramine Powder (Sugar-Free) 4 Gram(s) Oral every 24 hours  dextrose 10% Bolus 125 milliLiter(s) IV Bolus once  dextrose 5%. 1000 milliLiter(s) (100 mL/Hr) IV Continuous <Continuous>  dextrose 5%. 1000 milliLiter(s) (50 mL/Hr) IV Continuous <Continuous>  dextrose 50% Injectable 25 Gram(s) IV Push once  dextrose 50% Injectable 12.5 Gram(s) IV Push once  glucagon  Injectable 1 milliGRAM(s) IntraMuscular once  hydrocortisone Enema 100 milliGRAM(s) Rectal every 12 hours  insulin lispro (ADMELOG) corrective regimen sliding scale   SubCutaneous every 6 hours  levothyroxine Injectable 40 MICROGram(s) IV Push <User Schedule>  lipid, fat emulsion (Fish Oil and Plant Based) 20% Infusion 0.51 Gm/kG/Day (20.83 mL/Hr) IV Continuous <Continuous>  melatonin 5 milliGRAM(s) Oral at bedtime  pancrelipase  (CREON 24,000 Lipase Units) 1 Capsule(s) Oral three times a day with meals  Parenteral Nutrition - Adult 1 Each TPN Continuous <Continuous>  venlafaxine XR. 300 milliGRAM(s) Oral daily    MEDICATIONS  (PRN):  dextrose Oral Gel 15 Gram(s) Oral once PRN Blood Glucose LESS THAN 70 milliGRAM(s)/deciliter      Vital Signs Last 24 Hrs  T(C): 36.2 (12 May 2024 05:09), Max: 36.6 (11 May 2024 09:00)  T(F): 97.2 (12 May 2024 05:09), Max: 97.9 (11 May 2024 09:00)  HR: 94 (12 May 2024 05:00) (92 - 105)  BP: 107/54 (12 May 2024 05:00) (97/50 - 135/63)  BP(mean): 76 (12 May 2024 05:00) (70 - 90)  RR: 18 (12 May 2024 05:00) (12 - 29)  SpO2: 100% (12 May 2024 05:00) (90% - 100%)    Parameters below as of 12 May 2024 05:00  Patient On (Oxygen Delivery Method): BiPAP/CPAP    O2 Concentration (%): 50    Physical Exam:  General: NAD, resting comfortably in bed, jaundiced  Pulmonary: Nonlabored breathing, no respiratory distress  Cardiovascular: irregularly irregular  Abdominal: soft, NTND abdomen. Midline ECF with bilio-sanguinous output, with granulation tissue surrounding. Ileostomy with minimal output.  Extremities: WWP, pitting edema inBLE  Neuro: A/O x 3, CNs II-XII grossly intact,     I&O's Summary    10 May 2024 07:01  -  11 May 2024 07:00  --------------------------------------------------------  IN: 3528.6 mL / OUT: 1810 mL / NET: 1718.6 mL    11 May 2024 07:01  -  12 May 2024 05:58  --------------------------------------------------------  IN: 4669 mL / OUT: 2950 mL / NET: 1719 mL        LABS:                        8.2    18.72 )-----------( 86       ( 11 May 2024 19:43 )             24.4     05-11    139  |  100  |  90<H>  ----------------------------<  273<H>  3.5   |  27  |  2.17<H>    Ca    8.4      11 May 2024 19:43  Phos  4.2     05-11  Mg     2.6     05-11    TPro  5.2<L>  /  Alb  2.8<L>  /  TBili  8.2<H>  /  DBili  x   /  AST  260<H>  /  ALT  223<H>  /  AlkPhos  101  05-11    PT/INR - ( 11 May 2024 19:43 )   PT: 15.7 sec;   INR: 1.39          PTT - ( 11 May 2024 19:43 )  PTT:29.5 sec  Urinalysis Basic - ( 11 May 2024 19:43 )    Color: x / Appearance: x / SG: x / pH: x  Gluc: 273 mg/dL / Ketone: x  / Bili: x / Urobili: x   Blood: x / Protein: x / Nitrite: x   Leuk Esterase: x / RBC: x / WBC x   Sq Epi: x / Non Sq Epi: x / Bacteria: x      CAPILLARY BLOOD GLUCOSE      POCT Blood Glucose.: 213 mg/dL (11 May 2024 23:14)  POCT Blood Glucose.: 147 mg/dL (11 May 2024 18:12)  POCT Blood Glucose.: 188 mg/dL (11 May 2024 11:27)    LIVER FUNCTIONS - ( 11 May 2024 19:43 )  Alb: 2.8 g/dL / Pro: 5.2 g/dL / ALK PHOS: 101 U/L / ALT: 223 U/L / AST: 260 U/L / GGT: x             RADIOLOGY & ADDITIONAL STUDIES:

## 2024-05-12 NOTE — PROGRESS NOTE ADULT - ASSESSMENT
A/P.  Hgb drifting down post initial bump up.  Not clear where patient is bleeding.    Will get 1 unit this am of PRBC.  Agree with giving FFP and platelets so as to keep coags wnl.  May need more.  Not obviously bleeding presently.  Plan is for IR to attempt embolization of ileal source tomorrow.  Surgery and ?? SB resection tentatively planned for this coming Wednesday.  Follow cbc.   On ancef.  WBC slowly decresaing but still elevated  Lasix as tolerated. Creatinine flat  Anasarca noted . TPN in progress  Follow outputs.

## 2024-05-12 NOTE — PROGRESS NOTE ADULT - SUBJECTIVE AND OBJECTIVE BOX
HD # 7  5 [facility]  Covering for Dr. Kristen Poole in place    Patient sleeping this am  Lasix 20 mg given at midnight  On TPN    VS  Vital Signs Last 24 Hrs  T(C): 35.7 (12 May 2024 09:00), Max: 36.6 (11 May 2024 13:00)  T(F): 96.3 (12 May 2024 09:00), Max: 97.9 (11 May 2024 13:00)  HR: 110 (12 May 2024 12:14) (92 - 110)  BP: 127/61 (12 May 2024 11:00) (97/50 - 135/63)  BP(mean): 87 (12 May 2024 11:00) (70 - 90)  RR: 18 (12 May 2024 12:14) (12 - 28)  SpO2: 95% (12 May 2024 12:14) (90% - 100%)    Parameters below as of 12 May 2024 12:14  Patient On (Oxygen Delivery Method): nasal cannula  O2 Flow (L/min): 2    Abd: wound manager in place, some blood tinged liquid in bag.  Ileostomy is without output Flanks swollen, some ecchmoses  Ext: 4+ edema and swelling    UO 1,000 ml/ last shift; 2.4 liters/ 24 hours Fistula. 525+ml/ 24 hrs    Got 2 units PRBC, 1 unit FFP, 1 unit cryoglobulin, 1 unit  platelets yesterday     INR post above 1.37  Hgb 7.8 (had been 8.3 yesterday), Hct 22.7 at 5 AM today    Most recent labs:                        7.8    16.61 )-----------( 72       ( 12 May 2024 05:30 )             22.7   05-12    142  |  100  |  94<H>  ----------------------------<  149<H>  3.5   |  31  |  2.15<H>    Ca    8.5      12 May 2024 05:30  Phos  3.4     05-12  Mg     2.6     05-12    TPro  5.0<L>  /  Alb  3.0<L>  /  TBili  8.0<H>  /  DBili  5.9<H>  /  AST  221<H>  /  ALT  192<H>  /  AlkPhos  101  05-12

## 2024-05-12 NOTE — PROGRESS NOTE ADULT - ASSESSMENT
Patient is a 77yo M with complicated history,, PMH of AFib/Flutter s/p DCCV, Crohn's, prior ileocectomy and open appendectomy, extensive abdominal surgeries and ileostomy, short-bowel syndrome, hypothyroidism, depression, recently seen at the Emergency Department for blood transfusion and multiple recent admissions for transfusions who presents as a direct admission for recurrent anemia. FFP x 3 on May 3rd.     - Factors IX and XI are low consistent with liver dysfunction. Low-normal Fibrinogen    -elevated FVIII and VWF which are acute phase reactants  -consider checking FVII  - Reason for liver dysfunction is unexplained.  - would continue to trend PT/PTT/INR   -would trend fibrinogen with low treshold for cyro if fibrinogen decreasing  -has had several units of PRBC.  Would consider intermittent FFP to compensate for additional dilution    - Bleeding is likely associated with liver dysregulation and decreased factor production. Continue Vit K with TPN.  - FFP and PRBCs as needed. There is no goal INR so this will need to be done based on clinical judgement and if persistent hemoglobin decline     For possible IR procedure to address bleeding locally      Thank you      Mary Sagastume MD  for Dr Reyes Villatoro   [phone]

## 2024-05-12 NOTE — PROGRESS NOTE ADULT - SUBJECTIVE AND OBJECTIVE BOX
Pt seen and examined   condition not much better  sitting in chair  he says his sob netter    REVIEW OF SYSTEMS:  Constitutional: No fever,    Cardiovascular: No chest pain, palpitations,    Gastrointestinal: No abdominal or epigastric pain. No nausea,   Skin: No itching, burning, rashes or lesions       MEDICATIONS:  MEDICATIONS  (STANDING):  albumin human 25% IVPB 50 milliLiter(s) IV Intermittent every 8 hours  albuterol/ipratropium for Nebulization 3 milliLiter(s) Nebulizer every 6 hours  atorvastatin 20 milliGRAM(s) Oral at bedtime  bacitracin   Ointment 1 Application(s) Topical two times a day  ceFAZolin   IVPB 2000 milliGRAM(s) IV Intermittent every 12 hours  chlorhexidine 2% Cloths 1 Application(s) Topical daily  cholestyramine Powder (Sugar-Free) 4 Gram(s) Oral every 24 hours  dextrose 10% Bolus 125 milliLiter(s) IV Bolus once  dextrose 5%. 1000 milliLiter(s) (100 mL/Hr) IV Continuous <Continuous>  dextrose 5%. 1000 milliLiter(s) (50 mL/Hr) IV Continuous <Continuous>  dextrose 50% Injectable 25 Gram(s) IV Push once  dextrose 50% Injectable 12.5 Gram(s) IV Push once  glucagon  Injectable 1 milliGRAM(s) IntraMuscular once  hydrocortisone Enema 100 milliGRAM(s) Rectal every 12 hours  insulin lispro (ADMELOG) corrective regimen sliding scale   SubCutaneous every 6 hours  levothyroxine Injectable 40 MICROGram(s) IV Push <User Schedule>  lipid, fat emulsion (Fish Oil and Plant Based) 20% Infusion 0.51 Gm/kG/Day (20.83 mL/Hr) IV Continuous <Continuous>  melatonin 5 milliGRAM(s) Oral at bedtime  pancrelipase  (CREON 24,000 Lipase Units) 1 Capsule(s) Oral three times a day with meals  Parenteral Nutrition - Adult 1 Each TPN Continuous <Continuous>  Parenteral Nutrition - Adult 1 Each TPN Continuous <Continuous>  venlafaxine XR. 300 milliGRAM(s) Oral daily    MEDICATIONS  (PRN):  dextrose Oral Gel 15 Gram(s) Oral once PRN Blood Glucose LESS THAN 70 milliGRAM(s)/deciliter      Allergies    penicillin (Angioedema)    Intolerances        Vital Signs Last 24 Hrs  T(C): 35.7 (12 May 2024 12:14), Max: 36.3 (11 May 2024 17:10)  T(F): 96.3 (12 May 2024 12:14), Max: 97.3 (11 May 2024 17:10)  HR: 109 (12 May 2024 13:00) (92 - 110)  BP: 107/58 (12 May 2024 13:00) (97/50 - 135/63)  BP(mean): 79 (12 May 2024 13:00) (70 - 90)  RR: 26 (12 May 2024 13:00) (12 - 28)  SpO2: 96% (12 May 2024 13:00) (90% - 100%)    Parameters below as of 12 May 2024 13:00  Patient On (Oxygen Delivery Method): nasal cannula w/ humidification  O2 Flow (L/min): 2      05-11 @ 07:01  -  05-12 @ 07:00  --------------------------------------------------------  IN: 5266 mL / OUT: 3375 mL / NET: 1891 mL    05-12 @ 07:01  -  05-12 @ 13:49  --------------------------------------------------------  IN: 1097 mL / OUT: 1515 mL / NET: -418 mL        PHYSICAL EXAM:    General:   in no acute distress  HEENT: MMM, conjunctiva and sclera clear  Gastrointestinal: Soft   non-distended; Normal bowel sounds;2 bags no blood currently  Skin: Warm and dry. No obvious rash    LABS:      CBC Full  -  ( 12 May 2024 05:30 )  WBC Count : 16.61 K/uL  RBC Count : 2.63 M/uL  Hemoglobin : 7.8 g/dL  Hematocrit : 22.7 %  Platelet Count - Automated : 72 K/uL  Mean Cell Volume : 86.3 fl  Mean Cell Hemoglobin : 29.7 pg  Mean Cell Hemoglobin Concentration : 34.4 gm/dL  Auto Neutrophil # : 15.75 K/uL  Auto Lymphocyte # : 0.00 K/uL  Auto Monocyte # : 0.86 K/uL  Auto Eosinophil # : 0.00 K/uL  Auto Basophil # : 0.00 K/uL  Auto Neutrophil % : 94.8 %  Auto Lymphocyte % : 0.0 %  Auto Monocyte % : 5.2 %  Auto Eosinophil % : 0.0 %  Auto Basophil % : 0.0 %    05-12    142  |  100  |  94<H>  ----------------------------<  149<H>  3.5   |  31  |  2.15<H>    Ca    8.5      12 May 2024 05:30  Phos  3.4     05-12  Mg     2.6     05-12    TPro  5.0<L>  /  Alb  3.0<L>  /  TBili  8.0<H>  /  DBili  5.9<H>  /  AST  221<H>  /  ALT  192<H>  /  AlkPhos  101  05-12    PT/INR - ( 12 May 2024 05:30 )   PT: 16.2 sec;   INR: 1.44          PTT - ( 12 May 2024 05:30 )  PTT:31.8 sec      Urinalysis Basic - ( 12 May 2024 05:30 )    Color: x / Appearance: x / SG: x / pH: x  Gluc: 149 mg/dL / Ketone: x  / Bili: x / Urobili: x   Blood: x / Protein: x / Nitrite: x   Leuk Esterase: x / RBC: x / WBC x   Sq Epi: x / Non Sq Epi: x / Bacteria: x                RADIOLOGY & ADDITIONAL STUDIES (The following images were personally reviewed):

## 2024-05-12 NOTE — PROGRESS NOTE ADULT - SUBJECTIVE AND OBJECTIVE BOX
ON: : 8 PM labs Hb stable at 8., plts up to 86, fibrinogen 136 and INR 1.39. Additional 20 IVP lasix given at MN. AM Hb 7.8 - given 1 u PRBC. Remains with blood tinged output on wound manager.    SUBJECTIVE: patient reporting some nausea.    MEDICATIONS  (STANDING):  albumin human 25% IVPB 50 milliLiter(s) IV Intermittent every 8 hours  albuterol/ipratropium for Nebulization 3 milliLiter(s) Nebulizer every 6 hours  atorvastatin 20 milliGRAM(s) Oral at bedtime  bacitracin   Ointment 1 Application(s) Topical two times a day  ceFAZolin   IVPB 2000 milliGRAM(s) IV Intermittent every 12 hours  chlorhexidine 2% Cloths 1 Application(s) Topical daily  cholestyramine Powder (Sugar-Free) 4 Gram(s) Oral every 24 hours  dextrose 10% Bolus 125 milliLiter(s) IV Bolus once  dextrose 5%. 1000 milliLiter(s) (100 mL/Hr) IV Continuous <Continuous>  dextrose 5%. 1000 milliLiter(s) (50 mL/Hr) IV Continuous <Continuous>  dextrose 50% Injectable 25 Gram(s) IV Push once  dextrose 50% Injectable 12.5 Gram(s) IV Push once  glucagon  Injectable 1 milliGRAM(s) IntraMuscular once  hydrocortisone Enema 100 milliGRAM(s) Rectal every 12 hours  insulin lispro (ADMELOG) corrective regimen sliding scale   SubCutaneous every 6 hours  levothyroxine Injectable 40 MICROGram(s) IV Push <User Schedule>  lipid, fat emulsion (Fish Oil and Plant Based) 20% Infusion 0.51 Gm/kG/Day (20.83 mL/Hr) IV Continuous <Continuous>  melatonin 5 milliGRAM(s) Oral at bedtime  pancrelipase  (CREON 24,000 Lipase Units) 1 Capsule(s) Oral three times a day with meals  Parenteral Nutrition - Adult 1 Each TPN Continuous <Continuous>  venlafaxine XR. 300 milliGRAM(s) Oral daily    MEDICATIONS  (PRN):  dextrose Oral Gel 15 Gram(s) Oral once PRN Blood Glucose LESS THAN 70 milliGRAM(s)/deciliter      Drips:     ICU Vital Signs Last 24 Hrs  T(C): 35.7 (12 May 2024 09:00), Max: 36.6 (11 May 2024 13:00)  T(F): 96.3 (12 May 2024 09:00), Max: 97.9 (11 May 2024 13:00)  HR: 108 (12 May 2024 08:46) (92 - 108)  BP: 116/59 (12 May 2024 08:00) (97/50 - 135/63)  BP(mean): 84 (12 May 2024 08:00) (70 - 90)  ABP: 79/69 (11 May 2024 11:00) (79/69 - 88/79)  ABP(mean): 74 (11 May 2024 11:00) (74 - 83)  RR: 15 (12 May 2024 08:00) (12 - 29)  SpO2: 93% (12 May 2024 08:46) (90% - 100%)    O2 Parameters below as of 12 May 2024 08:00  Patient On (Oxygen Delivery Method): BiPAP/CPAP    O2 Concentration (%): 50        Physical Exam:  General: Resting comfortably in bed, NAD  Neuro: A&Ox3, no focal deficits  HEENT: MMM  Respiratory: non labored breathing, no respiratory distress. Lungs clear to auscultation with diminished sounds at bases  Cardiovascular: Irregularly irregular  Gastrointestinal: soft, NTND abdomen. Midline ECF with bilio-sanguinous output, with granulation tissue surrounding. Ileostomy with minimal output.  Genitourinary: Poole with dark yellow urine.   Extremities: 3+ pitting edema in BLE. WWP.   Skin: jaundiced.   Lines: R IJ TLC present.    Lines/tubes/drains:  Poole:	      Vent settings:      I&O's Summary    11 May 2024 07:01  -  12 May 2024 07:00  --------------------------------------------------------  IN: 5266 mL / OUT: 3375 mL / NET: 1891 mL    12 May 2024 07:01  -  12 May 2024 09:00  --------------------------------------------------------  IN: 188 mL / OUT: 100 mL / NET: 88 mL        LABS:                        7.8    16.61 )-----------( 72       ( 12 May 2024 05:30 )             22.7     05-12    142  |  100  |  94<H>  ----------------------------<  149<H>  3.5   |  31  |  2.15<H>    Ca    8.5      12 May 2024 05:30  Phos  3.4     05-12  Mg     2.6     05-12    TPro  5.0<L>  /  Alb  3.0<L>  /  TBili  8.0<H>  /  DBili  5.9<H>  /  AST  221<H>  /  ALT  192<H>  /  AlkPhos  101  05-12    PT/INR - ( 12 May 2024 05:30 )   PT: 16.2 sec;   INR: 1.44          PTT - ( 12 May 2024 05:30 )  PTT:31.8 sec  Urinalysis Basic - ( 12 May 2024 05:30 )    Color: x / Appearance: x / SG: x / pH: x  Gluc: 149 mg/dL / Ketone: x  / Bili: x / Urobili: x   Blood: x / Protein: x / Nitrite: x   Leuk Esterase: x / RBC: x / WBC x   Sq Epi: x / Non Sq Epi: x / Bacteria: x      CAPILLARY BLOOD GLUCOSE      POCT Blood Glucose.: 213 mg/dL (11 May 2024 23:14)  POCT Blood Glucose.: 147 mg/dL (11 May 2024 18:12)  POCT Blood Glucose.: 188 mg/dL (11 May 2024 11:27)    LIVER FUNCTIONS - ( 12 May 2024 05:30 )  Alb: 3.0 g/dL / Pro: 5.0 g/dL / ALK PHOS: 101 U/L / ALT: 192 U/L / AST: 221 U/L / GGT: x

## 2024-05-12 NOTE — PROGRESS NOTE ADULT - SUBJECTIVE AND OBJECTIVE BOX
77 yo man with a history of AFib/Flutter s/p DCCV, Crohn's, prior ileocecostomy and open appendectomy, extensive abdominal surgeries and ileostomy presents with persistent anemia       INTERVAL HISTORY 5/12/24  s/p 2 units PRBC on 5/11  s/p 1 unit PRBC on 5/12  less oozing this morning  more alert  sitting up in bed talking on the phone      MEDICATIONS  (STANDING):  albumin human 25% IVPB 50 milliLiter(s) IV Intermittent every 8 hours  albuterol/ipratropium for Nebulization 3 milliLiter(s) Nebulizer every 6 hours  atorvastatin 20 milliGRAM(s) Oral at bedtime  bacitracin   Ointment 1 Application(s) Topical two times a day  ceFAZolin   IVPB 2000 milliGRAM(s) IV Intermittent every 12 hours  chlorhexidine 2% Cloths 1 Application(s) Topical daily  cholestyramine Powder (Sugar-Free) 4 Gram(s) Oral every 24 hours  dextrose 10% Bolus 125 milliLiter(s) IV Bolus once  dextrose 5%. 1000 milliLiter(s) (100 mL/Hr) IV Continuous <Continuous>  dextrose 5%. 1000 milliLiter(s) (50 mL/Hr) IV Continuous <Continuous>  dextrose 50% Injectable 25 Gram(s) IV Push once  dextrose 50% Injectable 12.5 Gram(s) IV Push once  glucagon  Injectable 1 milliGRAM(s) IntraMuscular once  hydrocortisone Enema 100 milliGRAM(s) Rectal every 12 hours  insulin lispro (ADMELOG) corrective regimen sliding scale   SubCutaneous every 6 hours  levothyroxine Injectable 40 MICROGram(s) IV Push <User Schedule>  lipid, fat emulsion (Fish Oil and Plant Based) 20% Infusion 0.51 Gm/kG/Day (20.83 mL/Hr) IV Continuous <Continuous>  melatonin 5 milliGRAM(s) Oral at bedtime  pancrelipase  (CREON 24,000 Lipase Units) 1 Capsule(s) Oral three times a day with meals  Parenteral Nutrition - Adult 1 Each TPN Continuous <Continuous>  Parenteral Nutrition - Adult 1 Each TPN Continuous <Continuous>  venlafaxine XR. 300 milliGRAM(s) Oral daily    MEDICATIONS  (PRN):  dextrose Oral Gel 15 Gram(s) Oral once PRN Blood Glucose LESS THAN 70 milliGRAM(s)/deciliter    ICU Vital Signs Last 24 Hrs  T(C): 35.7 (12 May 2024 09:00), Max: 36.6 (11 May 2024 13:00)  T(F): 96.3 (12 May 2024 09:00), Max: 97.9 (11 May 2024 13:00)  HR: 107 (12 May 2024 10:00) (92 - 108)  BP: 110/59 (12 May 2024 10:00) (97/50 - 135/63)  BP(mean): 78 (12 May 2024 10:00) (70 - 90)  ABP: --  ABP(mean): --  RR: 15 (12 May 2024 10:00) (12 - 28)  SpO2: 93% (12 May 2024 10:00) (90% - 100%)    O2 Parameters below as of 12 May 2024 11:00  Patient On (Oxygen Delivery Method): nasal cannula  O2 Flow (L/min): 2    Physical Exam:  Gen:  AAOx3  Skin:  No jaundice, scattered ecchymosis  Lungs no accessory use  ABD Ileostomy present, stoma is pink and appears healthy.  No blood in ostomy bag this am  Bruising noted on bilateral arms and lower extremities   EXT; bilateral LE and scrotal edema                                      7.8    16.61 )-----------( 72       ( 12 May 2024 05:30 )             22.7     05-12    142  |  100  |  94<H>  ----------------------------<  149<H>  3.5   |  31  |  2.15<H>    Ca    8.5      12 May 2024 05:30  Phos  3.4     05-12  Mg     2.6     05-12    TPro  5.0<L>  /  Alb  3.0<L>  /  TBili  8.0<H>  /  DBili  5.9<H>  /  AST  221<H>  /  ALT  192<H>  /  AlkPhos  101  05-12

## 2024-05-12 NOTE — PROGRESS NOTE ADULT - ASSESSMENT
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 Prolonged hospital course significant for recurrent AKIs, cholestatic transaminitis s/p perc cony, prior DVTs, PNAs, fungemia, sinus pauses s/p pacemaker and recurrent bleeding from fistula most recently s/p IR embolization of L branch of inferior epigastric artery via R groin access (4/16), with multiple readmissions for continued bleeding from fistula and ostomy sites. S/p ileoscopy on noting an ulcer w/ clot proximal to stoma w/ hemoclip was applied (5/1). Admitted for symptomatic anemia secondary to bleeding from fistula site and ileostomy sites. Transferred to SICU 5/8 overnight for 2L blood loss from fistula site with subsequent hypotension. Now s/p ileoscopy, noting erythematous patch of bowel just deep to the ECF, without any active bleeding (5/9). S/p IR angiography noting  several areas of hyperemia in the jejunum adjacent to the stoma however no evidence of active bleeding (5/9). Patient with persistent blood tinged output on wound manager, this morning reporting nausea. Downtrending Cr. Planned for IR intervention on 5/13.    Neuro: Dilaudid PRNfor pain. Hx: Anxiety: Continue Effexor, Ativan PRN. Ambien on hold   CV: Hemorrhagic shock on 5/8, 2/2 EC Fistula bleed, now resolved. 2L of bright red blood. s/p PRBCs. Goal maintain MAPs > 65. Lactate 6.1 -->4.0 ---> 2.8, will trend to clear. Hx: AF: Metoprolol on hold 2/2 hypotension HLD: Continue Atorvastatin   Pulm: B/L effusions - unchanged. Nightly BIPAP at 40%.   GI: CLD. IR 5/9: segmental hyperemia, unable to embolize c/f bowel ischemia. Ileoscopy 5/9: erythematous patch of bowel just deep to the ECF, without active bleeding. CT 5/2 with bowel wall thickening. Holding methylprednisolone IV, started hydrocortisone enemas 5/10 as per GI. Continue TPN. Will hold off on remicade in setting of MSSA bacteremia. Pending IR on Monday 5/13  : Poole for strict Is and Os. ELVI likely from acute blood loss and contrast load (bl 1.2 - 1.4) - Cr improving this AM.   ID: Bacteremia 2/2 MSSA grown in BCx (5/9). Ancef (5/10- ) d/c: // Cefepime (5/10), Vanc x1 (5/9)  Endo: mISSc. Hx: Hypothyroidism. Continue synthroid.   Heme: Anemia 2/2 EC fistula bleed. Received a total of 10 units of PRBCs, 2 FFP, 1 plt this admission. Will give 1 FFP for INR 1.57  Lines: RIJ TLC (5/9--) L. Radial A-line (5/9--) d/c: // PICC (X-5/10)  PPx: SCDs  Dispo: SICU   independent

## 2024-05-13 NOTE — PROGRESS NOTE ADULT - ASSESSMENT
Patient is a 77yo M with complicated history,, PMH of AFib/Flutter s/p DCCV, Crohn's, prior ileocectomy and open appendectomy, extensive abdominal surgeries and ileostomy, short-bowel syndrome, hypothyroidism, depression, recently seen at the Emergency Department for blood transfusion and multiple recent admissions for transfusions who presents as a direct admission for recurrent anemia. FFP x 3 on May 3rd.     clinically slightly better  Factors IX and XI are low consistent with liver dysfunction. Low-normal Fibrinogen    elevated FVIII and VWF which are acute phase reactants  - Reason for liver dysfunction is unexplained.  would continue to trend PT/PTT/INR   would trend fibrinogen with low threshold for cryo if fibrinogen decreasing  Bleeding is likely associated with liver dysregulation and decreased factor production. Continue Vit K with TPN.  Recommend FFP and PRBCs as needed for active bleeding  Possible IR procedure to address bleeding locally      Thank you      Shruthi Thompson MD  for Dr Reyes Villatoro   [phone]

## 2024-05-13 NOTE — PROGRESS NOTE ADULT - ASSESSMENT
Interval Events:  K low overnight therefore repleted. Given IV lasix this am for fluid overload.   Patient seen and examined at bedside.      Allergies    penicillin (Angioedema)    Intolerances        Vital Signs Last 24 Hrs  T(C): 36.2 (13 May 2024 05:26), Max: 36.4 (12 May 2024 21:07)  T(F): 97.2 (13 May 2024 05:26), Max: 97.5 (12 May 2024 21:07)  HR: 111 (13 May 2024 06:00) (85 - 111)  BP: 125/59 (13 May 2024 06:00) (102/55 - 127/61)  BP(mean): 85 (13 May 2024 06:00) (73 - 93)  RR: 20 (13 May 2024 06:00) (10 - 29)  SpO2: 94% (13 May 2024 06:00) (90% - 100%)    Parameters below as of 13 May 2024 06:00  Patient On (Oxygen Delivery Method): BiPAP/CPAP    O2 Concentration (%): 50    05-11 @ 07:01  -  05-12 @ 07:00  --------------------------------------------------------  IN: 5266 mL / OUT: 3375 mL / NET: 1891 mL    05-12 @ 07:01 - 05-13 @ 06:47  --------------------------------------------------------  IN: 4291.6 mL / OUT: 4100 mL / NET: 191.6 mL      05-11 @ 07:01  -  05-12 @ 07:00  --------------------------------------------------------  IN: 5266 mL / OUT: 3375 mL / NET: 1891 mL    05-12 @ 07:01 - 05-13 @ 06:47  --------------------------------------------------------  IN: 4291.6 mL / OUT: 4100 mL / NET: 191.6 mL        Physical Exam:     General: Resting comfortably in bed, NAD  Neuro: A&Ox3, no focal deficits  HEENT: MMM  Respiratory: non labored breathing, no respiratory distress. Lungs clear to auscultation with diminished sounds at bases  Cardiovascular: Irregularly irregular  Gastrointestinal: soft, NTND abdomen. Midline ECF with bilio-sanguinous output, with granulation tissue surrounding. Ileostomy with minimal output.  Genitourinary: Poole with dark yellow urine.   Extremities: 3+ pitting edema in BLE. WWP.   Skin: jaundiced.   Lines: R IJ TLC present.      LABS:      CBC Full  -  ( 13 May 2024 04:26 )  WBC Count : 12.94 K/uL  RBC Count : 3.11 M/uL  Hemoglobin : 8.8 g/dL  Hematocrit : 27.1 %  Platelet Count - Automated : 49 K/uL  Mean Cell Volume : 87.1 fl  Mean Cell Hemoglobin : 28.3 pg  Mean Cell Hemoglobin Concentration : 32.5 gm/dL  Auto Neutrophil # : 11.44 K/uL  Auto Lymphocyte # : 0.38 K/uL  Auto Monocyte # : 1.03 K/uL  Auto Eosinophil # : 0.02 K/uL  Auto Basophil # : 0.01 K/uL  Auto Neutrophil % : 88.3 %  Auto Lymphocyte % : 2.9 %  Auto Monocyte % : 8.0 %  Auto Eosinophil % : 0.2 %  Auto Basophil % : 0.1 %    05-13    140  |  99  |  90<H>  ----------------------------<  204<H>  3.8   |  30  |  1.96<H>    Ca    8.3<L>      13 May 2024 04:26  Phos  2.4     05-13  Mg     2.4     05-13    TPro  5.4<L>  /  Alb  3.1<L>  /  TBili  8.7<H>  /  DBili  6.2<H>  /  AST  173<H>  /  ALT  125<H>  /  AlkPhos  104  05-13    PT/INR - ( 13 May 2024 04:26 )   PT: 16.3 sec;   INR: 1.45          PTT - ( 13 May 2024 04:26 )  PTT:32.2 sec      Urinalysis Basic - ( 13 May 2024 04:26 )    Color: x / Appearance: x / SG: x / pH: x  Gluc: 204 mg/dL / Ketone: x  / Bili: x / Urobili: x   Blood: x / Protein: x / Nitrite: x   Leuk Esterase: x / RBC: x / WBC x   Sq Epi: x / Non Sq Epi: x / Bacteria: x              RADIOLOGY & ADDITIONAL STUDIES (The following images were personally reviewed):          A/p: 78 year old male with past medical history of AFib/Flutter 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 Prolonged hospital course significant for recurrent AKIs, cholestatic transaminitis s/p perc cony, prior DVTs, PNAs, fungemia, sinus pauses s/p pacemaker and recurrent bleeding from fistula most recently s/p IR embolization of L branch of inferior epigastric artery via R groin access (4/16), with multiple readmissions for continued bleeding from fistula and ostomy sites. S/p ileoscopy on noting an ulcer w/ clot proximal to stoma w/ hemoclip was applied (5/1). Admitted for symptomatic anemia secondary to bleeding from fistula site and ileostomy sites. Transferred to SICU 5/8 overnight for 2L blood loss from fistula site with subsequent hypotension. Now s/p ileoscopy, noting erythematous patch of bowel just deep to the ECF, without any active bleeding (5/9). S/p IR angiography noting  several areas of hyperemia in the jejunum adjacent to the stoma however no evidence of active bleeding (5/9).     Neuro: Dilaudid prn for pain. Hx: Anxiety: Continue Effexor. Ambien on hold   CV: Hemorrhagic shock on 5/8, 2/2 EC Fistula bleed, now resolved. 2L of bright red blood. S/p total of 3 units of PRBCs (overnight 5/8), responded appropriately 8.3 (5). Keep MAPs > 65. Hx: AF: Metoprolol on hold 2/2 hypotension HLD: Continue Atorvastatin. Total body overload: Cont Lasix for goal I/O negative 1-2L/day, Cont Albumin 25%q8 to help mobilization. + MSSA bacteremia: F/u Echo for vegetation  Pulm: Pleural Effusions b/l Rt improved on 5/12 xray. Left continued to be present. Will Cont to Diurese with Lasix. Satting low 90's on RA, nightly BIPAP at 40%.   GI: CLD. GI Bleed vs bleeding at fistula site: IR 5/9: segmental hyperemia, unable to embolize c/f bowel ischemia. Ileoscopy 5/9:erythematous patch of bowel just deep to the ECF, without active bleeding. CT 5/2 with bowel wall thickening. Increased Bili: F/u US of rt UQ, Cont cholestyramine. Cont Creon Holding methylprednisolone IV, started hydrocortisone enemas 5/10 as per GI. Continue TPN/ Lipids. Will hold off on remicade in setting of MSSA bacteremia.   : Voids. ELVI likely from acute blood loss. Creatinine  now improving. Baseline 1.2 - 1.4. Urine Studies/Cystatin C pending.   ID: Bacteremia 2/2 MSSA grown in BCx (5/9) repeat Bld cx's on 5/11 NGTD.  PICC line removed. F/u Echo. WBC improved afebrile.  Ancef (5/10- ) d/c: // Cefepime (5/10), Vanc x1 (5/9)  Endo: mISSc. Hx: Hypothyroidism. Continue synthroid.   PPx: SCDs, SQH on hold for bleeding.   Heme: Anemia 2/2 EC fistula bleed. Received a total of 11 units of PRBCs, 2 FFP,2 plt, 1 cryo this admission. Trend CBC. 5 of Vit k in TPN. Plts 49 this am will repeat in afternoon if cont to dec may transfuse  Wounds: Bacitracin to rt arm skin tear  Lines: RIJ TLC (5/9--) Will remove L. Radial A-line today if stable (5/9--) d/c: // PICC (X-5/9)  Dispo: SICU   Interval Events:  K low overnight therefore repleted. Given IV lasix this am for fluid overload.   Patient seen and examined at bedside.      Allergies    penicillin (Angioedema)    Intolerances        Vital Signs Last 24 Hrs  T(C): 36.2 (13 May 2024 05:26), Max: 36.4 (12 May 2024 21:07)  T(F): 97.2 (13 May 2024 05:26), Max: 97.5 (12 May 2024 21:07)  HR: 111 (13 May 2024 06:00) (85 - 111)  BP: 125/59 (13 May 2024 06:00) (102/55 - 127/61)  BP(mean): 85 (13 May 2024 06:00) (73 - 93)  RR: 20 (13 May 2024 06:00) (10 - 29)  SpO2: 94% (13 May 2024 06:00) (90% - 100%)    Parameters below as of 13 May 2024 06:00  Patient On (Oxygen Delivery Method): BiPAP/CPAP    O2 Concentration (%): 50    05-11 @ 07:01  -  05-12 @ 07:00  --------------------------------------------------------  IN: 5266 mL / OUT: 3375 mL / NET: 1891 mL    05-12 @ 07:01 - 05-13 @ 06:47  --------------------------------------------------------  IN: 4291.6 mL / OUT: 4100 mL / NET: 191.6 mL      05-11 @ 07:01  -  05-12 @ 07:00  --------------------------------------------------------  IN: 5266 mL / OUT: 3375 mL / NET: 1891 mL    05-12 @ 07:01 - 05-13 @ 06:47  --------------------------------------------------------  IN: 4291.6 mL / OUT: 4100 mL / NET: 191.6 mL        Physical Exam:     General: Resting comfortably in bed, NAD  Neuro: A&Ox3, no focal deficits  HEENT: MMM  Respiratory: non labored breathing, no respiratory distress. Lungs clear to auscultation with diminished sounds at bases  Cardiovascular: Irregularly irregular  Gastrointestinal: soft, NTND abdomen. Midline ECF with bilio-sanguinous output, with granulation tissue surrounding. Ileostomy with minimal output.  Genitourinary: [place] with dark yellow urine.   Extremities: 3+ pitting edema in BLE. WWP.   Skin: jaundiced.   Lines: R IJ TLC present.      LABS:      CBC Full  -  ( 13 May 2024 04:26 )  WBC Count : 12.94 K/uL  RBC Count : 3.11 M/uL  Hemoglobin : 8.8 g/dL  Hematocrit : 27.1 %  Platelet Count - Automated : 49 K/uL  Mean Cell Volume : 87.1 fl  Mean Cell Hemoglobin : 28.3 pg  Mean Cell Hemoglobin Concentration : 32.5 gm/dL  Auto Neutrophil # : 11.44 K/uL  Auto Lymphocyte # : 0.38 K/uL  Auto Monocyte # : 1.03 K/uL  Auto Eosinophil # : 0.02 K/uL  Auto Basophil # : 0.01 K/uL  Auto Neutrophil % : 88.3 %  Auto Lymphocyte % : 2.9 %  Auto Monocyte % : 8.0 %  Auto Eosinophil % : 0.2 %  Auto Basophil % : 0.1 %    05-13    140  |  99  |  90<H>  ----------------------------<  204<H>  3.8   |  30  |  1.96<H>    Ca    8.3<L>      13 May 2024 04:26  Phos  2.4     05-13  Mg     2.4     05-13    TPro  5.4<L>  /  Alb  3.1<L>  /  TBili  8.7<H>  /  DBili  6.2<H>  /  AST  173<H>  /  ALT  125<H>  /  AlkPhos  104  05-13    PT/INR - ( 13 May 2024 04:26 )   PT: 16.3 sec;   INR: 1.45          PTT - ( 13 May 2024 04:26 )  PTT:32.2 sec      Urinalysis Basic - ( 13 May 2024 04:26 )    Color: x / Appearance: x / SG: x / pH: x  Gluc: 204 mg/dL / Ketone: x  / Bili: x / Urobili: x   Blood: x / Protein: x / Nitrite: x   Leuk Esterase: x / RBC: x / WBC x   Sq Epi: x / Non Sq Epi: x / Bacteria: x              RADIOLOGY & ADDITIONAL STUDIES (The following images were personally reviewed):          A/p: 78 year old male with past medical history of AFib/Flutter 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 Prolonged hospital course significant for recurrent AKIs, cholestatic transaminitis s/p perc cony, prior DVTs, PNAs, fungemia, sinus pauses s/p pacemaker and recurrent bleeding from fistula most recently s/p IR embolization of L branch of inferior epigastric artery via R groin access (4/16), with multiple readmissions for continued bleeding from fistula and ostomy sites. S/p ileoscopy on noting an ulcer w/ clot proximal to stoma w/ hemoclip was applied (5/1). Admitted for symptomatic anemia secondary to bleeding from fistula site and ileostomy sites. Transferred to SICU 5/8 overnight for 2L blood loss from fistula site with subsequent hypotension. Now s/p ileoscopy, noting erythematous patch of bowel just deep to the ECF, without any active bleeding (5/9). S/p IR angiography noting  several areas of hyperemia in the jejunum adjacent to the stoma however no evidence of active bleeding (5/9).     Neuro: Dilaudid prn for pain. Hx: Anxiety: Continue Effexor. Ambien on hold   CV: Hemorrhagic shock on 5/8, 2/2 EC Fistula bleed, now resolved. 2L of bright red blood. S/p total of 3 units of PRBCs (overnight 5/8), responded appropriately 8.3 (5). Keep MAPs > 65. Hx: AF: Metoprolol on hold 2/2 hypotension HLD: Continue Atorvastatin. Total body overload: Cont Lasix for goal I/O negative 1-2L/day, Cont Albumin 25%q8 to help mobilization. + MSSA bacteremia: F/u Echo for vegetation  Pulm: Pleural Effusions b/l Rt improved on 5/12 xray. Left continued to be present. Will Cont to Diurese with Lasix. Satting low 90's on RA, nightly BIPAP at 40%.   GI: CLD. GI Bleed vs bleeding at fistula site: IR 5/9: segmental hyperemia, unable to embolize c/f bowel ischemia. Ileoscopy 5/9:erythematous patch of bowel just deep to the ECF, without active bleeding. CT 5/2 with bowel wall thickening. Increased Bili: F/u US of rt UQ, Cont cholestyramine. Cont Creon Holding methylprednisolone IV, started hydrocortisone enemas 5/10 as per GI. Continue TPN/ Lipids. Will hold off on remicade in setting of MSSA bacteremia. portal vein thrombus on duplex from 5/13 - unable to give A/C 2* bleed  : Voids. ELVI likely from acute blood loss. Creatinine  now improving. Baseline 1.2 - 1.4. Urine Studies/Cystatin C pending.   ID: Bacteremia 2/2 MSSA grown in BCx (5/9) repeat Bld cx's on 5/11 NGTD.  PICC line removed. F/u Echo. WBC improved afebrile.  Ancef (5/10- ) d/c: // Cefepime (5/10), Vanc x1 (5/9)  Endo: mISSc. Hx: Hypothyroidism. Continue synthroid.   PPx: SCDs, SQH on hold for bleeding.   Heme: Anemia 2/2 EC fistula bleed. Received a total of 11 units of PRBCs, 2 FFP,2 plt, 1 cryo this admission. Trend CBC. 5 of Vit k in TPN. Plts 49 this am will repeat in afternoon if cont to dec may transfuse  Wounds: Bacitracin to rt arm skin tear  Lines: RIJ TLC (5/9--) Will remove L. Radial A-line today if stable (5/9--) d/c: // PICC (X-5/9)  Dispo: SICU

## 2024-05-13 NOTE — PROGRESS NOTE ADULT - ASSESSMENT
# MSSA bacteremia, hospital onset, line associated  - BCx x2 + on 5/9  - Likely RUE PICC line for chronic TPN as source. This was removed and R IJ TLC placed on 5/9. No signs of metastatic infection on exam, but does have remote bilateral knee arthroplasties (no signs of infection there). Also has loop recorder but these devices are entirely subQ.  - TTE w/o vegetation    Recommendations:  - f/u BCx  - Cont cefazolin 2g IV q12h. Ultimate duration TBD but likely minimum 4 weeks given presence of hardware.  - R IJ TLC will ultimately have to be replaced as was placed while BCx were +    ID Team 1 will follow # MSSA bacteremia, hospital onset, line associated  - BCx x2 + on 5/9  - Likely RUE PICC line for chronic TPN as source. This was removed and R IJ TLC placed on 5/9. No signs of metastatic infection on exam, but does have remote bilateral knee arthroplasties (no signs of infection there). Also has loop recorder but these devices are entirely subQ.  - TTE 5/13 w/o vegetation    Recommendations:  - f/u BCx  - Repeat BCx x1 today  - Cont cefazolin 2g IV q12h. Ultimate duration TBD but likely minimum 4 weeks given presence of hardware.  - R IJ TLC will ultimately have to be replaced as was placed while BCx were +    ID Team 1 will follow

## 2024-05-13 NOTE — PROGRESS NOTE ADULT - CRITICAL CARE ATTENDING COMMENT
Patient seen and examined;  Coagulapathy noted;  Will give another dose of Lasix today;  Heme follow up   Likely IR embolization tomorrow;  Follow H/H
Patient seen and examined;  Portal vein thrombosis noted;  Coagulapathy being addressed   No bleeding today
Patient seen and examined;  Long discussion with multiple consultants re plans;  Likely Angiogram and embolization on Monday

## 2024-05-13 NOTE — PROGRESS NOTE ADULT - SUBJECTIVE AND OBJECTIVE BOX
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 Prolonged hospital course significant for recurrent AKIs, cholestatic transaminitis s/p perc cony, prior DVTs, PNAs, fungemia, sinus pauses s/p pacemaker and recurrent bleeding from fistula most recently s/p IR embolization of L branch of inferior epigastric artery via R groin access (4/16), with multiple readmissions for continued bleeding from fistula and ostomy sites. S/p ileoscopy on noting an ulcer w/ clot proximal to stoma w/ hemoclip was applied (5/1). Admitted for symptomatic anemia secondary to bleeding from fistula site and ileostomy sites. Transferred to SICU 5/8 overnight for 2L blood loss from fistula site with subsequent hypotension. Now s/p ileoscopy, noting erythematous patch of bowel just deep to the ECF, without any active bleeding (5/9). S/p IR angiography noting  several areas of hyperemia in the jejunum adjacent to the stoma however no evidence of active bleeding (5/9).     - R groin access site c/d/i. Soft to palpation. No evidence of hematoma. 2+ femoral pulse.

## 2024-05-13 NOTE — PROGRESS NOTE ADULT - ATTENDING COMMENTS
Clinically stable, repeat BCx ngtd and TTE without e/o endocarditis. Please repeat blood cx x1, continue ancef. Likely will plan for 4 week course.

## 2024-05-13 NOTE — PROGRESS NOTE ADULT - SUBJECTIVE AND OBJECTIVE BOX
stoma bags being changed  so far no bleed x 24 hours  wbc better  continue with cortenema  antibiotics for bacteremia  Rxs per surgical team

## 2024-05-13 NOTE — PROGRESS NOTE ADULT - SUBJECTIVE AND OBJECTIVE BOX
Hem Onc    INTERVAL HISTORY   5/12/24  The patient feels OK today.   Ongoing oozing, but improved  s/p 1 unit PRBC on 5/12    79 yo man with a history of AFib/Flutter s/p DCCV, Crohn's, prior ileocecostomy and open appendectomy, extensive abdominal surgeries and ileostomy presents with persistent anemia     MEDICATIONS  (STANDING):  albumin human 25% IVPB 50 milliLiter(s) IV Intermittent every 8 hours  albuterol/ipratropium for Nebulization 3 milliLiter(s) Nebulizer every 6 hours  atorvastatin 20 milliGRAM(s) Oral at bedtime  bacitracin   Ointment 1 Application(s) Topical two times a day  ceFAZolin   IVPB 2000 milliGRAM(s) IV Intermittent every 12 hours  chlorhexidine 2% Cloths 1 Application(s) Topical daily  cholestyramine Powder (Sugar-Free) 4 Gram(s) Oral every 24 hours  dextrose 10% Bolus 125 milliLiter(s) IV Bolus once  dextrose 5%. 1000 milliLiter(s) (100 mL/Hr) IV Continuous <Continuous>  dextrose 5%. 1000 milliLiter(s) (50 mL/Hr) IV Continuous <Continuous>  dextrose 50% Injectable 25 Gram(s) IV Push once  dextrose 50% Injectable 12.5 Gram(s) IV Push once  glucagon  Injectable 1 milliGRAM(s) IntraMuscular once  hydrocortisone Enema 100 milliGRAM(s) Rectal every 12 hours  insulin lispro (ADMELOG) corrective regimen sliding scale   SubCutaneous every 6 hours  levothyroxine Injectable 40 MICROGram(s) IV Push <User Schedule>  lipid, fat emulsion (Fish Oil and Plant Based) 20% Infusion 0.51 Gm/kG/Day (20.83 mL/Hr) IV Continuous <Continuous>  melatonin 5 milliGRAM(s) Oral at bedtime  pancrelipase  (CREON 24,000 Lipase Units) 1 Capsule(s) Oral three times a day with meals  Parenteral Nutrition - Adult 1 Each TPN Continuous <Continuous>  Parenteral Nutrition - Adult 1 Each TPN Continuous <Continuous>  venlafaxine XR. 300 milliGRAM(s) Oral daily    MEDICATIONS  (PRN):  dextrose Oral Gel 15 Gram(s) Oral once PRN Blood Glucose LESS THAN 70 milliGRAM(s)/deciliter    ICU Vital Signs Last 24 Hrs  T(C): 35.7 (12 May 2024 09:00), Max: 36.6 (11 May 2024 13:00)  T(F): 96.3 (12 May 2024 09:00), Max: 97.9 (11 May 2024 13:00)  HR: 107 (12 May 2024 10:00) (92 - 108)  BP: 110/59 (12 May 2024 10:00) (97/50 - 135/63)  BP(mean): 78 (12 May 2024 10:00) (70 - 90)  ABP: --  ABP(mean): --  RR: 15 (12 May 2024 10:00) (12 - 28)  SpO2: 93% (12 May 2024 10:00) (90% - 100%)    O2 Parameters below as of 12 May 2024 11:00  Patient On (Oxygen Delivery Method): nasal cannula  O2 Flow (L/min): 2    Physical Exam:  Gen:  AAOx3  Skin:  No jaundice, scattered ecchymosis  Lungs no accessory use  ABD Ileostomy present, stoma is pink and appears healthy.  No blood in ostomy bag this am  Bruising noted on bilateral arms and lower extremities   EXT; bilateral LE and scrotal edema               LABSL:               8.9    13.71 )-----------( 45       ( 13 May 2024 12:05 )             26.7       CBC Full  -  ( 13 May 2024 12:05 )  WBC Count : 13.71 K/uL  RBC Count : 3.04 M/uL  Hemoglobin : 8.9 g/dL  Hematocrit : 26.7 %  Platelet Count - Automated : 45 K/uL  Mean Cell Volume : 87.8 fl  Mean Cell Hemoglobin : 29.3 pg  Mean Cell Hemoglobin Concentration : 33.3 gm/dL  Auto Neutrophil # : x  Auto Lymphocyte # : x  Auto Monocyte # : x  Auto Eosinophil # : x  Auto Basophil # : x  Auto Neutrophil % : x  Auto Lymphocyte % : x  Auto Monocyte % : x  Auto Eosinophil % : x  Auto Basophil % : x    05-13    140  |  99  |  90<H>  ----------------------------<  204<H>  3.8   |  30  |  1.96<H>    Ca    8.3<L>      13 May 2024 04:26  Phos  2.4     05-13  Mg     2.4     05-13    TPro  5.4<L>  /  Alb  3.1<L>  /  TBili  8.7<H>  /  DBili  6.2<H>  /  AST  173<H>  /  ALT  125<H>  /  AlkPhos  104  05-13        PT/INR - ( 13 May 2024 04:26 )   PT: 16.3 sec;   INR: 1.45          PTT - ( 13 May 2024 04:26 )  PTT:32.2 sec

## 2024-05-13 NOTE — CHART NOTE - NSCHARTNOTEFT_GEN_A_CORE
Admitting Diagnosis:   Patient is a 78y old  Male who presents with a chief complaint of Symptomatic anemia (13 May 2024 07:48)      PAST MEDICAL & SURGICAL HISTORY:  Essential hypertension  Hypertension      Atrial fibrillation  s/p cardioversion 2010 and 2014  Pt. reports 4 DCCV  Now on Amiodarone 200mg PO bid and Eliquis 5mg PO bid  Last DCCV 4 yrs ago at [facility]      Crohn's disease  s/p partial resection of ileum      Hyperlipidemia      Hypothyroidism      History of depression  On Venlafaxine ER 150mg PO bid      Junctional rhythm      Bradycardia      H/O knee surgery      History of cataract surgery      S/P appendectomy          Current Nutrition Order: NPO; 1.9L TPN via PICC @125ml/hr x 16hrs- 370g Dextrose, 120g AA, 50g SMOF lipids; provides 2238 kcals, 120g protein, GIR 3.9 mg/kg/min    PO Intake: Good (%) [   ]  Fair (50-75%) [   ] Poor (<25%) [   ] - N/A    GI Issues: 1490cc fistula output x 24hrs, 60cc ostomy output x 24hrs    Pain: no pain/discomfort noted    Skin Integrity: jaundiced, 3+ pitting edema to BLE, EC fistula site, R arm skin tears    I&Os:   05-12-24 @ 07:01  -  05-13-24 @ 07:00  --------------------------------------------------------  IN: 4401.6 mL / OUT: 4750 mL / NET: -348.4 mL    05-13-24 @ 07:01  -  05-13-24 @ 10:33  --------------------------------------------------------  IN: 541.6 mL / OUT: 750 mL / NET: -208.4 mL        Labs:   05-13    140  |  99  |  90<H>  ----------------------------<  204<H>  3.8   |  30  |  1.96<H>    Ca    8.3<L>      13 May 2024 04:26  Phos  2.4     05-13  Mg     2.4     05-13    TPro  5.4<L>  /  Alb  3.1<L>  /  TBili  8.7<H>  /  DBili  6.2<H>  /  AST  173<H>  /  ALT  125<H>  /  AlkPhos  104  05-13    CAPILLARY BLOOD GLUCOSE      POCT Blood Glucose.: 194 mg/dL (13 May 2024 06:40)  POCT Blood Glucose.: 123 mg/dL (13 May 2024 00:53)  POCT Blood Glucose.: 76 mg/dL (12 May 2024 19:01)  POCT Blood Glucose.: 196 mg/dL (12 May 2024 10:59)      Medications:  MEDICATIONS  (STANDING):  albumin human 25% IVPB 50 milliLiter(s) IV Intermittent every 8 hours  albuterol/ipratropium for Nebulization 3 milliLiter(s) Nebulizer every 6 hours  atorvastatin 20 milliGRAM(s) Oral at bedtime  bacitracin   Ointment 1 Application(s) Topical two times a day  ceFAZolin   IVPB 2000 milliGRAM(s) IV Intermittent every 12 hours  chlorhexidine 2% Cloths 1 Application(s) Topical daily  cholestyramine Powder (Sugar-Free) 4 Gram(s) Oral every 24 hours  dextrose 10% Bolus 125 milliLiter(s) IV Bolus once  dextrose 5%. 1000 milliLiter(s) (100 mL/Hr) IV Continuous <Continuous>  dextrose 5%. 1000 milliLiter(s) (50 mL/Hr) IV Continuous <Continuous>  dextrose 50% Injectable 25 Gram(s) IV Push once  dextrose 50% Injectable 12.5 Gram(s) IV Push once  glucagon  Injectable 1 milliGRAM(s) IntraMuscular once  hydrocortisone Enema 100 milliGRAM(s) Rectal every 12 hours  insulin lispro (ADMELOG) corrective regimen sliding scale   SubCutaneous every 6 hours  levothyroxine Injectable 40 MICROGram(s) IV Push <User Schedule>  lipid, fat emulsion (Fish Oil and Plant Based) 20% Infusion 0.51 Gm/kG/Day (20.83 mL/Hr) IV Continuous <Continuous>  lipid, fat emulsion (Fish Oil and Plant Based) 20% Infusion 0.51 Gm/kG/Day (20.83 mL/Hr) IV Continuous <Continuous>  melatonin 5 milliGRAM(s) Oral at bedtime  nystatin Powder 1 Application(s) Topical <User Schedule>  ondansetron Injectable 4 milliGRAM(s) IV Push once  pancrelipase  (CREON 24,000 Lipase Units) 1 Capsule(s) Oral three times a day with meals  Parenteral Nutrition - Adult 1 Each TPN Continuous <Continuous>  Parenteral Nutrition - Adult 1 Each TPN Continuous <Continuous>  venlafaxine XR. 300 milliGRAM(s) Oral daily    MEDICATIONS  (PRN):  dextrose Oral Gel 15 Gram(s) Oral once PRN Blood Glucose LESS THAN 70 milliGRAM(s)/deciliter  ondansetron Injectable 4 milliGRAM(s) IV Push every 6 hours PRN Nausea and/or Vomiting      Weight:    5/13 98.7kg  5/10 110.1kg  5/9 98kg    Weight Change: wt fluctuations anticpated in setting of fluid shifts/edema. Continue daily/weekly weights for trending    Estimated energy needs:   Ideal body weight (83.4kg) used for calculations as pt >100% IBW and BMI <30 per [facility] Standards of Care. Needs estimated for age and adjusted for current clinical status, renal function, increased needs for wound healing, high outputs. Fluid needs per team.    Calories: 4667-6792 kcals based on 25-35 kcals/kg  Protein: 100-166.8g based on 1.2-2.0g protein/kg  Fluid needs per team*    Subjective:   78 year old male with past medical history of AFib/Flutter 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 Prolonged hospital course significant for recurrent AKIs, cholestatic transaminitis s/p perc cony, prior DVTs, PNAs, fungemia, sinus pauses s/p pacemaker and recurrent bleeding from fistula most recently s/p IR embolization of L branch of inferior epigastric artery via R groin access (4/16), with multiple readmissions for continued bleeding from fistula and ostomy sites. S/p ileoscopy on noting an ulcer w/ clot proximal to stoma w/ hemoclip was applied (5/1). Admitted for symptomatic anemia secondary to bleeding from fistula site and ileostomy sites. Transferred to SICU 5/8 overnight for 2L blood loss from fistula site with subsequent hypotension. Now s/p ileoscopy, noting erythematous patch of bowel just deep to the ECF, without any active bleeding (5/9). S/p IR angiography noting  several areas of hyperemia in the jejunum adjacent to the stoma however no evidence of active bleeding (5/9).     Chart reviewed. Pt seen on 5 EA receiving care from wound care team at time of assessment. Discussed with IDT today during AM rounds. TPN remains primary means to nutrition, now ordered for clear liquid diet. Pt with bowel length <120cm without colon in continuity & high output fistula [>500ml daily]. Insufficient bowel to maintain/restore nutrition status through PO diet alone- unclear amount absorbed from PO diet. Current TPN provision provides 26.8 kcals/kg, 21.1 non-protein kcals/kg, 1.44g protein/kg IBW. Labs notable for increase in total bilirubin & direct bilirubin [ALT & AST downtrending]. Fingersticks  mg/dL x 24hrs, phos low today [increased in tpn bag], increase in BUN [90 today], likely secondary to lasix use. Triglycerides 93 [5/12]. Meds reviewed- on zofran prn, syntrhoid, cholestyramine, creon TID. TPN reordered for tonight. RDN will continue to monitor, reassess, and intervene as appropriate.     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 c/w TPN via PICC as primary means to nutrition- recommend 370g Dextrose, 120g AA, 50g SMOF lipids; provides 2238 kcals, 120g protein, GIR 3.9 mg/kg/min  - provides 26.8 kcals/kg, 21.1 non-protein kcals/kg, 1.44g protein/kg IBW  - monitor renal function, increase amino acid provision to meet needs/account for losses as renal function improves  - fluid & lytes per team  - continue to monitor renal & hepatic labs, adjust substrates as indicated  2. Fluid & lytes per team  - monitor outputs & adjust for losses prn  3. PO diet per MD discretion  - c/w clear liquid diet & monitor tolerance closely  - previously on low fiber diet, resume as medically feasible  - recommend 1pk Ottoniel BID; provides 160 kcals, 14g arginine, 14g glutamine, 5g collagen   4. Weekly lipid panel  - hold / decrease lipid infusion if TG >400  - trend hepatic labs, adjust substrates in bag prn  5. Continue zinc and copper in bag 3x per week  - recheck serum levels monthly  6. Daily BMP/Mg/Phos, fingersticks Q4-6hrs  - monitor lytes closely & replete outside bag prn  - Kphos increased in bag today  7. Monitor I&Os  8. Recommend Daily/Weekly weights for trending  9. Consider UUN & fecal fat studies to monitor changes in PO absorption  10. Close monitoring of weights, outputs, labs, adjust TPN as indicated to prevent over/underfeeding    Education: ongoing nutrition education prn    Risk Level: High [ XX ] Moderate [   ] Low [   ]

## 2024-05-13 NOTE — PROGRESS NOTE ADULT - SUBJECTIVE AND OBJECTIVE BOX
Infectious Disease Progress Note:  LARRY KELLY is a 78yMale patient    No acute events overnight     ROS:  CONSTITUTIONAL:  Negative fever or chills, feels well, good appetite  EYES:  Negative  blurry vision or double vision  CARDIOVASCULAR:  Negative for chest pain or palpitations  RESPIRATORY:  Negative for cough, wheezing, or SOB   GASTROINTESTINAL:  feels nauseous but negative for vomiting, diarrhea, constipation, or abdominal pain  GENITOURINARY:  Negative frequency, urgency or dysuria  NEUROLOGIC:  No headache, confusion, dizziness, lightheadedness    Allergies  penicillin (Angioedema)    ANTIBIOTICS/RELEVANT:  ceFAZolin   IVPB 2000 milliGRAM(s) IV Intermittent every 12 hours    OTHER:  albumin human 25% IVPB 50 milliLiter(s) IV Intermittent every 8 hours  albuterol/ipratropium for Nebulization 3 milliLiter(s) Nebulizer every 6 hours  atorvastatin 20 milliGRAM(s) Oral at bedtime  bacitracin   Ointment 1 Application(s) Topical two times a day  chlorhexidine 2% Cloths 1 Application(s) Topical daily  cholestyramine Powder (Sugar-Free) 4 Gram(s) Oral every 24 hours  dextrose 10% Bolus 125 milliLiter(s) IV Bolus once  dextrose 5%. 1000 milliLiter(s) IV Continuous <Continuous>  dextrose 5%. 1000 milliLiter(s) IV Continuous <Continuous>  dextrose 50% Injectable 25 Gram(s) IV Push once  dextrose 50% Injectable 12.5 Gram(s) IV Push once  dextrose Oral Gel 15 Gram(s) Oral once PRN  glucagon  Injectable 1 milliGRAM(s) IntraMuscular once  hydrocortisone Enema 100 milliGRAM(s) Rectal every 12 hours  insulin lispro (ADMELOG) corrective regimen sliding scale   SubCutaneous every 6 hours  levothyroxine Injectable 40 MICROGram(s) IV Push <User Schedule>  lipid, fat emulsion (Fish Oil and Plant Based) 20% Infusion 0.51 Gm/kG/Day IV Continuous <Continuous>  melatonin 5 milliGRAM(s) Oral at bedtime  nystatin Powder 1 Application(s) Topical <User Schedule>  ondansetron Injectable 4 milliGRAM(s) IV Push once  ondansetron Injectable 4 milliGRAM(s) IV Push every 6 hours PRN  pancrelipase  (CREON 24,000 Lipase Units) 1 Capsule(s) Oral three times a day with meals  Parenteral Nutrition - Adult 1 Each TPN Continuous <Continuous>  Parenteral Nutrition - Adult 1 Each TPN Continuous <Continuous>  venlafaxine XR. 300 milliGRAM(s) Oral daily    Objective:  Vital Signs Last 24 Hrs  T(C): 36 (13 May 2024 13:08), Max: 36.6 (13 May 2024 08:06)  T(F): 96.8 (13 May 2024 13:08), Max: 97.8 (13 May 2024 08:06)  HR: 112 (13 May 2024 16:00) (85 - 113)  BP: 138/68 (13 May 2024 16:00) (102/58 - 148/66)  BP(mean): 85 (13 May 2024 16:00) (74 - 95)  RR: 17 (13 May 2024 16:00) (10 - 26)  SpO2: 97% (13 May 2024 16:00) (90% - 100%)    Parameters below as of 13 May 2024 16:00  Patient On (Oxygen Delivery Method): nasal cannula w/ humidification  O2 Flow (L/min): 2    PHYSICAL EXAM:  Constitutional: NAD  Eyes:PATRICIA, EOMI  Ear/Nose/Throat: no oral lesion  Neck:no JVD, no lymphadenopathy, supple, TLC R neck  Respiratory: CTA b/l   Cardiovascular: S1S2 RRR, no murmurs  Gastrointestinal:soft, (+) BS, no HSM, ileostomy  Extremities: Bilateral TKR scars without knee swelling/tenderness/erythema. No other joint swelling/pain. No edema    LABS:                        8.9    13.71 )-----------( 45       ( 13 May 2024 12:05 )             26.7     05-13    140  |  99  |  90<H>  ----------------------------<  204<H>  3.8   |  30  |  1.96<H>    Ca    8.3<L>      13 May 2024 04:26  Phos  2.4     05-13  Mg     2.4     05-13    TPro  5.4<L>  /  Alb  3.1<L>  /  TBili  8.7<H>  /  DBili  6.2<H>  /  AST  173<H>  /  ALT  125<H>  /  AlkPhos  104  05-13    PT/INR - ( 13 May 2024 04:26 )   PT: 16.3 sec;   INR: 1.45       PTT - ( 13 May 2024 04:26 )  PTT:32.2 sec  Urinalysis Basic - ( 13 May 2024 04:26 )    Color: x / Appearance: x / SG: x / pH: x  Gluc: 204 mg/dL / Ketone: x  / Bili: x / Urobili: x   Blood: x / Protein: x / Nitrite: x   Leuk Esterase: x / RBC: x / WBC x   Sq Epi: x / Non Sq Epi: x / Bacteria: x    MICROBIOLOGY:  Reviewed    RADIOLOGY & ADDITIONAL STUDIES:  Reviewed

## 2024-05-14 NOTE — PROGRESS NOTE ADULT - SUBJECTIVE AND OBJECTIVE BOX
Interval Events:    Patient seen and examined at bedside.      Allergies    penicillin (Angioedema)    Intolerances        Vital Signs Last 24 Hrs  T(C): 36.3 (14 May 2024 06:08), Max: 36.6 (13 May 2024 08:06)  T(F): 97.3 (14 May 2024 06:08), Max: 97.8 (13 May 2024 08:06)  HR: 112 (14 May 2024 06:24) (96 - 114)  BP: 138/66 (14 May 2024 05:00) (106/55 - 148/66)  BP(mean): 95 (14 May 2024 05:00) (75 - 95)  RR: 17 (14 May 2024 06:24) (15 - 26)  SpO2: 100% (14 May 2024 06:24) (90% - 100%)    Parameters below as of 14 May 2024 06:24  Patient On (Oxygen Delivery Method): nasal cannula w/ humidification  O2 Flow (L/min): 2      05-12 @ 07:01  -  05-13 @ 07:00  --------------------------------------------------------  IN: 4401.6 mL / OUT: 4750 mL / NET: -348.4 mL    05-13 @ 07:01 - 05-14 @ 06:57  --------------------------------------------------------  IN: 2778.6 mL / OUT: 4395 mL / NET: -1616.4 mL      05-12 @ 07:01  -  05-13 @ 07:00  --------------------------------------------------------  IN: 4401.6 mL / OUT: 4750 mL / NET: -348.4 mL    05-13 @ 07:01 - 05-14 @ 06:57  --------------------------------------------------------  IN: 2778.6 mL / OUT: 4395 mL / NET: -1616.4 mL        Physical Exam:     General: Resting comfortably in bed, NAD  Neuro: A&Ox3, no focal deficits  HEENT: MMM  Respiratory: non labored breathing, no respiratory distress. Lungs clear to auscultation with diminished sounds at bases  Cardiovascular: Irregularly irregular  Gastrointestinal: soft, NTND abdomen. Midline ECF with bilio-sanguinous output, with granulation tissue surrounding. Ileostomy with minimal output.  Genitourinary: Poole with dark yellow urine.   Extremities: 3+ pitting edema in BLE. WWP.   Skin: jaundiced.   Lines: R IJ TLC present.      LABS:      CBC Full  -  ( 14 May 2024 03:05 )  WBC Count : 12.75 K/uL  RBC Count : 2.97 M/uL  Hemoglobin : 8.6 g/dL  Hematocrit : 26.5 %  Platelet Count - Automated : 44 K/uL  Mean Cell Volume : 89.2 fl  Mean Cell Hemoglobin : 29.0 pg  Mean Cell Hemoglobin Concentration : 32.5 gm/dL  Auto Neutrophil # : 11.26 K/uL  Auto Lymphocyte # : 0.34 K/uL  Auto Monocyte # : 1.11 K/uL  Auto Eosinophil # : 0.02 K/uL  Auto Basophil # : 0.01 K/uL  Auto Neutrophil % : 87.6 %  Auto Lymphocyte % : 2.7 %  Auto Monocyte % : 8.7 %  Auto Eosinophil % : 0.2 %  Auto Basophil % : 0.1 %    05-14    143  |  99  |  80<H>  ----------------------------<  264<H>  3.5   |  33<H>  |  1.72<H>    Ca    8.4      14 May 2024 03:05  Phos  3.9     05-14  Mg     2.3     05-14    TPro  5.6<L>  /  Alb  3.2<L>  /  TBili  9.5<H>  /  DBili  6.5<H>  /  AST  153<H>  /  ALT  88<H>  /  AlkPhos  105  05-14    PT/INR - ( 14 May 2024 03:05 )   PT: 17.0 sec;   INR: 1.51          PTT - ( 14 May 2024 03:05 )  PTT:32.0 sec      Urinalysis Basic - ( 14 May 2024 03:05 )    Color: x / Appearance: x / SG: x / pH: x  Gluc: 264 mg/dL / Ketone: x  / Bili: x / Urobili: x   Blood: x / Protein: x / Nitrite: x   Leuk Esterase: x / RBC: x / WBC x   Sq Epi: x / Non Sq Epi: x / Bacteria: x              RADIOLOGY & ADDITIONAL STUDIES (The following images were personally reviewed):          A/p: 78 year old male with past medical history of AFib/Flutter 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 Prolonged hospital course significant for recurrent AKIs, cholestatic transaminitis s/p perc cony, prior DVTs, PNAs, fungemia, sinus pauses s/p pacemaker and recurrent bleeding from fistula most recently s/p IR embolization of L branch of inferior epigastric artery via R groin access (4/16), with multiple readmissions for continued bleeding from fistula and ostomy sites. S/p ileoscopy on noting an ulcer w/ clot proximal to stoma w/ hemoclip was applied (5/1). Admitted for symptomatic anemia secondary to bleeding from fistula site and ileostomy sites. Transferred to SICU 5/8 overnight for 2L blood loss from fistula site with subsequent hypotension. Now s/p ileoscopy, noting erythematous patch of bowel just deep to the ECF, without any active bleeding (5/9). S/p IR angiography noting  several areas of hyperemia in the jejunum adjacent to the stoma however no evidence of active bleeding (5/9).     Neuro: Dilaudid prn for pain. Hx: Anxiety: Continue Effexor. Ambien on hold   CV: Hemorrhagic shock on 5/8, 2/2 EC Fistula bleed, now resolved. 2L of bright red blood. S/p total of 3 units of PRBCs (overnight 5/8), responded appropriately 8.3 (5). Keep MAPs > 65. Hx: AF: Restart Metoprolol today for continued tachycardia given BP improved. HLD: Continue Atorvastatin. Total body overload: Cont Lasix for goal I/O negative 1-2L/day, Cont Albumin 25%q8 to help mobilization. + MSSA bacteremia: F/u Echo for vegetation negative on 5/13.   Pulm: Pleural Effusions b/l Rt improved on 5/12 xray. Left continued to be present. Will Cont to Diurese with Lasix. Satting low 90's on RA, nightly BIPAP at 40%.   GI: CLD. GI Bleed vs bleeding at fistula site: IR 5/9: segmental hyperemia, unable to embolize c/f bowel ischemia. Ileoscopy 5/9:erythematous patch of bowel just deep to the ECF, without active bleeding. CT 5/2 with bowel wall thickening. Increased Bili: F/u US of rt UQ, Cont cholestyramine. Cont Creon Holding methylprednisolone IV, started hydrocortisone enemas 5/10 as per GI. Continue TPN/ Lipids. Will hold off on remicade in setting of MSSA bacteremia. portal vein thrombus on duplex from 5/13 - unable to give A/C 2* bleed  : Poole will dc today. ELVI likely from acute blood loss now resolving. Baseline 1.2 - 1.4. Urine Studies/Cystatin C pending.   ID: Bacteremia 2/2 MSSA grown in BCx (5/9) repeat Bld cx's on 5/11 NGTD.  PICC line removed. Echo negative for vegetation. WBC improved afebrile.  + Fungitel: Pt with hx of + blood cx's with candida glabrata from 11/24/23  Start Ancef (5/10- ) d/c: // Cefepime (5/10), Vanc x1 (5/9)  Endo: mISSc. Hx: Hypothyroidism. Continue synthroid.   PPx: SCDs, SQH on hold for bleeding.   Heme: Anemia 2/2 EC fistula bleed. Received a total of 11 units of PRBCs, 2 FFP,2 plt, 1 cryo this admission. Trend CBC. 5 of Vit k in TPN. Plts 49 this am will repeat in afternoon if cont to dec may transfuse  Wounds: Bacitracin to rt arm skin tear  Lines: RIJ TLC (5/9--) Will remove L. Radial A-line today if stable (5/9--) d/c: // PICC (X-5/9)  Dispo: SICU Interval Events:  C/o Nausea overnight. Given ativan for anxiety and states he was able to sleep.    Patient seen and examined at bedside.      Allergies    penicillin (Angioedema)    Intolerances        Vital Signs Last 24 Hrs  T(C): 36.3 (14 May 2024 06:08), Max: 36.6 (13 May 2024 08:06)  T(F): 97.3 (14 May 2024 06:08), Max: 97.8 (13 May 2024 08:06)  HR: 112 (14 May 2024 06:24) (96 - 114)  BP: 138/66 (14 May 2024 05:00) (106/55 - 148/66)  BP(mean): 95 (14 May 2024 05:00) (75 - 95)  RR: 17 (14 May 2024 06:24) (15 - 26)  SpO2: 100% (14 May 2024 06:24) (90% - 100%)    Parameters below as of 14 May 2024 06:24  Patient On (Oxygen Delivery Method): nasal cannula w/ humidification  O2 Flow (L/min): 2      05-12 @ 07:01  -  05-13 @ 07:00  --------------------------------------------------------  IN: 4401.6 mL / OUT: 4750 mL / NET: -348.4 mL    05-13 @ 07:01 - 05-14 @ 06:57  --------------------------------------------------------  IN: 2778.6 mL / OUT: 4395 mL / NET: -1616.4 mL      05-12 @ 07:01 - 05-13 @ 07:00  --------------------------------------------------------  IN: 4401.6 mL / OUT: 4750 mL / NET: -348.4 mL    05-13 @ 07:01  -  05-14 @ 06:57  --------------------------------------------------------  IN: 2778.6 mL / OUT: 4395 mL / NET: -1616.4 mL        Physical Exam:     General: Resting comfortably in bed, NAD  Neuro: A&Ox3, no focal deficits  HEENT: MMM  Respiratory: non labored breathing, no respiratory distress. Lungs clear to auscultation with diminished sounds at bases  Cardiovascular: Irregularly irregular  Gastrointestinal: soft, NTND abdomen. Midline ECF with bilio-sanguinous output, with granulation tissue surrounding. Ileostomy with minimal output.  Genitourinary: Po[place] with dark yellow urine.   Extremities: 3+ pitting edema in BLE. WWP. + UExt: 1+ pitting edema   Skin: jaundiced.   Lines: R IJ TLC present.      LABS:      CBC Full  -  ( 14 May 2024 03:05 )  WBC Count : 12.75 K/uL  RBC Count : 2.97 M/uL  Hemoglobin : 8.6 g/dL  Hematocrit : 26.5 %  Platelet Count - Automated : 44 K/uL  Mean Cell Volume : 89.2 fl  Mean Cell Hemoglobin : 29.0 pg  Mean Cell Hemoglobin Concentration : 32.5 gm/dL  Auto Neutrophil # : 11.26 K/uL  Auto Lymphocyte # : 0.34 K/uL  Auto Monocyte # : 1.11 K/uL  Auto Eosinophil # : 0.02 K/uL  Auto Basophil # : 0.01 K/uL  Auto Neutrophil % : 87.6 %  Auto Lymphocyte % : 2.7 %  Auto Monocyte % : 8.7 %  Auto Eosinophil % : 0.2 %  Auto Basophil % : 0.1 %    05-14    143  |  99  |  80<H>  ----------------------------<  264<H>  3.5   |  33<H>  |  1.72<H>    Ca    8.4      14 May 2024 03:05  Phos  3.9     05-14  Mg     2.3     05-14    TPro  5.6<L>  /  Alb  3.2<L>  /  TBili  9.5<H>  /  DBili  6.5<H>  /  AST  153<H>  /  ALT  88<H>  /  AlkPhos  105  05-14    PT/INR - ( 14 May 2024 03:05 )   PT: 17.0 sec;   INR: 1.51          PTT - ( 14 May 2024 03:05 )  PTT:32.0 sec      Urinalysis Basic - ( 14 May 2024 03:05 )    Color: x / Appearance: x / SG: x / pH: x  Gluc: 264 mg/dL / Ketone: x  / Bili: x / Urobili: x   Blood: x / Protein: x / Nitrite: x   Leuk Esterase: x / RBC: x / WBC x   Sq Epi: x / Non Sq Epi: x / Bacteria: x              RADIOLOGY & ADDITIONAL STUDIES (The following images were personally reviewed):          A/p: 78 year old male with past medical history of AFib/Flutter 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 Prolonged hospital course significant for recurrent AKIs, cholestatic transaminitis s/p perc cony, prior DVTs, PNAs, fungemia, sinus pauses s/p pacemaker and recurrent bleeding from fistula most recently s/p IR embolization of L branch of inferior epigastric artery via R groin access (4/16), with multiple readmissions for continued bleeding from fistula and ostomy sites. S/p ileoscopy on noting an ulcer w/ clot proximal to stoma w/ hemoclip was applied (5/1). Admitted for symptomatic anemia secondary to bleeding from fistula site and ileostomy sites. Transferred to SICU 5/8 overnight for 2L blood loss from fistula site with subsequent hypotension. Now s/p ileoscopy, noting erythematous patch of bowel just deep to the ECF, without any active bleeding (5/9). S/p IR angiography noting  several areas of hyperemia in the jejunum adjacent to the stoma however no evidence of active bleeding (5/9).     Neuro: Dilaudid prn for pain. Hx: Anxiety: Continue Effexor. Ambien on hold   CV: Hemorrhagic shock on 5/8, 2/2 EC Fistula bleed, now resolved. 2L of bright red blood. S/p total of 3 units of PRBCs (overnight 5/8), responded appropriately 8.3 (5). Keep MAPs > 65. Hx: AF: Restart Metoprolol today for continued tachycardia given BP improved. HLD: Continue Atorvastatin. Total body overload: Cont Lasix for goal I/O negative 1-2L/day, Cont Albumin 25%q8 to help mobilization. + MSSA bacteremia: F/u Echo for vegetation negative on 5/13. Hgb dec this am will repeat today.   Pulm: Pleural Effusions b/l Rt improved on 5/12 xray. Left continued to be present. Will Cont to Diurese with Lasix. Satting low 90's on RA, nightly BIPAP at 40%.   GI: CLD. GI Bleed vs bleeding at fistula site: IR 5/9: segmental hyperemia, unable to embolize c/f bowel ischemia. Ileoscopy 5/9:erythematous patch of bowel just deep to the ECF, without active bleeding. CT 5/2 with bowel wall thickening. Increased Bili: F/u US of rt UQ, Cont cholestyramine. Cont Creon Holding methylprednisolone IV, started hydrocortisone enemas 5/10 as per GI. Continue TPN/ Lipids. Will hold off on remicade in setting of MSSA bacteremia. portal vein thrombus on duplex from 5/13 - unable to give A/C 2* bleed  : Po[place] will dc today. ELVI likely from acute blood loss now resolving. Baseline 1.2 - 1.4. Urine Studies/Cystatin C pending.   ID: Bacteremia 2/2 MSSA grown in BCx (5/9) repeat Bld cx's on 5/11 NGTD.  PICC line removed. Echo negative for vegetation. WBC improved afebrile.  + Fungitel: Pt with hx of + blood cx's with candida glabrata from 11/24/23  Start Ancef (5/10- ) d/c: // Cefepime (5/10), Vanc x1 (5/9)  Endo: mISSc. Hx: Hypothyroidism. Continue synthroid.   PPx: SCDs, SQH on hold for bleeding.   Heme: Anemia 2/2 EC fistula bleed. Received a total of 11 units of PRBCs, 2 FFP,2 plt, 1 cryo this admission. Trend CBC. 5 of Vit k removed from TPN on 5/14. Plts 49 this am will repeat in afternoon if cont to dec may transfuse  Wounds: Bacitracin to rt arm skin tear  Lines: RIJ TLC (5/9--) Will remove L. Radial A-line today if stable (5/9--) d/c: // PICC (X-5/9)  Dispo: SICU

## 2024-05-14 NOTE — PROGRESS NOTE ADULT - ASSESSMENT
Excellent diuresis  Portal vein thrombosis and low platelets noted and discussed with ICU team  Cont diuresis with lasix

## 2024-05-14 NOTE — PROGRESS NOTE ADULT - ATTENDING COMMENTS
AF, Crohn's, s/p SBR, ileostomy, enteroatmospheric fistula with persistent anemia, thrombocytopenia, blood loss, portal vein thrombosis, MSSA bacteremia  physical as above  follow H/H  if bleeds significantly may need to transfuse platelets and fibrinogen  no AC for now  continue ancef  continue TPN

## 2024-05-14 NOTE — PROGRESS NOTE ADULT - SUBJECTIVE AND OBJECTIVE BOX
kidney function improving  H&H stable  thrombocytopenia  VSS  bili up  transaminases down  portal vein thrombus  cant anticoagulate

## 2024-05-14 NOTE — PROGRESS NOTE ADULT - ASSESSMENT
# MSSA bacteremia, hospital onset, line associated  - BCx x2 + on 5/9  - Repeat BCx 5/11 NGTD so far.   - Likely RUE PICC line for chronic TPN as source. This was removed and R IJ TLC placed on 5/9. No signs of metastatic infection on exam, but does have remote bilateral knee arthroplasties (no signs of infection there). Also has loop recorder but these devices are entirely subQ.  - TTE 5/13 w/o vegetation    Recommendations:  - f/u BCx  - C/w cefazolin 2g IV q12h. Ultimate duration TBD but likely minimum 4 weeks given presence of hardware.  - R IJ TLC will ultimately have to be replaced as was placed while BCx were +    Recommendations not considered final until attending attestation # MSSA bacteremia, hospital onset, line associated  - BCx x2 + on 5/9  - Repeat BCx 5/11 NGTD so far.   - Likely RUE PICC line for chronic TPN as source. This was removed and R IJ TLC placed on 5/9. No signs of metastatic infection on exam, but does have remote bilateral knee arthroplasties (no signs of infection there). Also has loop recorder but these devices are entirely subQ.  - TTE 5/13 w/o vegetation    Recommendations:  - f/u BCx  - C/w cefazolin 2g IV q12h. Ultimate duration TBD but likely minimum 4 weeks given presence of hardware.  - R IJ TLC will ultimately have to be replaced as was placed while BCx were +    Recommendations not considered final until attending attestation    ID Team 1 will follow

## 2024-05-14 NOTE — PROGRESS NOTE ADULT - ATTENDING COMMENTS
Clinically well appearing. Repeat blood cx ngtd. TTE neg for vegetation. 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 Continue ancef.

## 2024-05-14 NOTE — PROGRESS NOTE ADULT - SUBJECTIVE AND OBJECTIVE BOX
Infectious Disease Progress Note:  LARRY KELLY is a 78yMale patient PMHx of AFib/Flutter 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 Prolonged hospital course significant for recurrent AKIs, cholestatic transaminitis s/p perc cony, prior DVTs, PNAs, fungemia, sinus pauses s/p pacemaker and recurrent bleeding from fistula recently s/p IR embolization of L branch of inferior epigastric artery via R groin access (4/16), with mutiple presentations afterwards for recurrent bleeding at fistula site requiring transfusions. Found to have likely line associated MSSA bacteremia.         No acute events overnight     GI BLEED ANEMIA          ROS:  CONSTITUTIONAL:  Negative fever or chills, feels well, good appetite  EYES:  Negative  blurry vision or double vision  CARDIOVASCULAR:  Negative for chest pain or palpitations  RESPIRATORY:  Negative for cough, wheezing, or SOB   GASTROINTESTINAL:  Negative for vomiting, diarrhea, constipation, or abdominal pain. Reports intermittent nausea.   GENITOURINARY:  Negative frequency, urgency or dysuria  NEUROLOGIC:  No headache, confusion, dizziness, lightheadedness    Allergies    penicillin (Angioedema)    Intolerances        ANTIBIOTICS/RELEVANT:  antimicrobials  ceFAZolin   IVPB 2000 milliGRAM(s) IV Intermittent every 12 hours    immunologic:    OTHER:  albuterol/ipratropium for Nebulization 3 milliLiter(s) Nebulizer every 6 hours  atorvastatin 20 milliGRAM(s) Oral at bedtime  bacitracin   Ointment 1 Application(s) Topical two times a day  chlorhexidine 2% Cloths 1 Application(s) Topical daily  cholestyramine Powder (Sugar-Free) 4 Gram(s) Oral every 24 hours  dextrose 10% Bolus 125 milliLiter(s) IV Bolus once  dextrose 5%. 1000 milliLiter(s) IV Continuous <Continuous>  dextrose 5%. 1000 milliLiter(s) IV Continuous <Continuous>  dextrose 50% Injectable 25 Gram(s) IV Push once  dextrose 50% Injectable 12.5 Gram(s) IV Push once  dextrose Oral Gel 15 Gram(s) Oral once PRN  glucagon  Injectable 1 milliGRAM(s) IntraMuscular once  hydrocortisone Enema 100 milliGRAM(s) Rectal every 12 hours  insulin lispro (ADMELOG) corrective regimen sliding scale   SubCutaneous every 6 hours  levothyroxine Injectable 40 MICROGram(s) IV Push <User Schedule>  lipid, fat emulsion (Fish Oil and Plant Based) 20% Infusion 0.51 Gm/kG/Day IV Continuous <Continuous>  melatonin 5 milliGRAM(s) Oral at bedtime  metoprolol tartrate Injectable 5 milliGRAM(s) IV Push every 6 hours  nystatin Powder 1 Application(s) Topical <User Schedule>  ondansetron Injectable 4 milliGRAM(s) IV Push every 6 hours PRN  ondansetron Injectable 4 milliGRAM(s) IV Push once  pancrelipase  (CREON 24,000 Lipase Units) 1 Capsule(s) Oral three times a day with meals  Parenteral Nutrition - Adult 1 Each TPN Continuous <Continuous>  Parenteral Nutrition - Adult 1 Each TPN Continuous <Continuous>  potassium chloride  20 mEq/100 mL IVPB 20 milliEquivalent(s) IV Intermittent every 2 hours  venlafaxine XR. 300 milliGRAM(s) Oral daily      Objective:  Vital Signs Last 24 Hrs  T(C): 36.3 (14 May 2024 12:00), Max: 36.3 (14 May 2024 06:08)  T(F): 97.3 (14 May 2024 12:00), Max: 97.3 (14 May 2024 06:08)  HR: 115 (14 May 2024 11:00) (111 - 115)  BP: 141/64 (14 May 2024 11:00) (115/59 - 155/72)  BP(mean): 92 (14 May 2024 11:00) (78 - 104)  RR: 23 (14 May 2024 11:00) (15 - 27)  SpO2: 100% (14 May 2024 11:00) (94% - 100%)    Parameters below as of 14 May 2024 12:00  Patient On (Oxygen Delivery Method): nasal cannula w/ humidification  O2 Flow (L/min): 2      PHYSICAL EXAM:  Constitutional: Well-developed, well nourished  Eyes:PATRICIA, EOMI	  Neck:no JVD, no lymphadenopathy, supple  Respiratory: No respiratory distress.   Gastrointestinal:soft, (+) BS, no HSM  Extremities: no e/e/c. Knee ROM intact without pain.   Lines: RIJ central line clean appearing, no erythema or discharge.       LABS:                        8.6    13.50 )-----------( 41       ( 14 May 2024 07:50 )             26.9     05-14    145  |  102  |  82<H>  ----------------------------<  240<H>  3.4<L>   |  32<H>  |  1.73<H>    Ca    8.6      14 May 2024 07:50  Phos  3.7     05-14  Mg     2.3     05-14    TPro  5.6<L>  /  Alb  3.2<L>  /  TBili  9.5<H>  /  DBili  6.5<H>  /  AST  153<H>  /  ALT  88<H>  /  AlkPhos  105  05-14    PT/INR - ( 14 May 2024 03:05 )   PT: 17.0 sec;   INR: 1.51          PTT - ( 14 May 2024 03:05 )  PTT:32.0 sec  Urinalysis Basic - ( 14 May 2024 07:50 )    Color: x / Appearance: x / SG: x / pH: x  Gluc: 240 mg/dL / Ketone: x  / Bili: x / Urobili: x   Blood: x / Protein: x / Nitrite: x   Leuk Esterase: x / RBC: x / WBC x   Sq Epi: x / Non Sq Epi: x / Bacteria: x          MICROBIOLOGY:        RADIOLOGY & ADDITIONAL STUDIES: Infectious Disease Progress Note:  LARRY KELLY is a 78yMale patient PMHx of AFib/Flutter 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 Prolonged hospital course significant for recurrent AKIs, cholestatic transaminitis s/p perc cony, prior DVTs, PNAs, fungemia, sinus pauses s/p pacemaker and recurrent bleeding from fistula recently s/p IR embolization of L branch of inferior epigastric artery via R groin access (4/16), with mutiple presentations afterwards for recurrent bleeding at fistula site requiring transfusions. Found to have likely line associated MSSA bacteremia.         No acute events overnight     GI BLEED ANEMIA          ROS:  CONSTITUTIONAL:  Negative fever or chills, feels well, good appetite  EYES:  Negative  blurry vision or double vision  CARDIOVASCULAR:  Negative for chest pain or palpitations  RESPIRATORY:  Negative for cough, wheezing, or SOB   GASTROINTESTINAL:  Negative for vomiting, diarrhea, constipation, or abdominal pain. Reports intermittent nausea.   GENITOURINARY:  Negative frequency, urgency or dysuria  NEUROLOGIC:  No headache, confusion, dizziness, lightheadedness    Allergies    penicillin (Angioedema)    Intolerances        ANTIBIOTICS/RELEVANT:  antimicrobials  ceFAZolin   IVPB 2000 milliGRAM(s) IV Intermittent every 12 hours    immunologic:    OTHER:  albuterol/ipratropium for Nebulization 3 milliLiter(s) Nebulizer every 6 hours  atorvastatin 20 milliGRAM(s) Oral at bedtime  bacitracin   Ointment 1 Application(s) Topical two times a day  chlorhexidine 2% Cloths 1 Application(s) Topical daily  cholestyramine Powder (Sugar-Free) 4 Gram(s) Oral every 24 hours  dextrose 10% Bolus 125 milliLiter(s) IV Bolus once  dextrose 5%. 1000 milliLiter(s) IV Continuous <Continuous>  dextrose 5%. 1000 milliLiter(s) IV Continuous <Continuous>  dextrose 50% Injectable 25 Gram(s) IV Push once  dextrose 50% Injectable 12.5 Gram(s) IV Push once  dextrose Oral Gel 15 Gram(s) Oral once PRN  glucagon  Injectable 1 milliGRAM(s) IntraMuscular once  hydrocortisone Enema 100 milliGRAM(s) Rectal every 12 hours  insulin lispro (ADMELOG) corrective regimen sliding scale   SubCutaneous every 6 hours  levothyroxine Injectable 40 MICROGram(s) IV Push <User Schedule>  lipid, fat emulsion (Fish Oil and Plant Based) 20% Infusion 0.51 Gm/kG/Day IV Continuous <Continuous>  melatonin 5 milliGRAM(s) Oral at bedtime  metoprolol tartrate Injectable 5 milliGRAM(s) IV Push every 6 hours  nystatin Powder 1 Application(s) Topical <User Schedule>  ondansetron Injectable 4 milliGRAM(s) IV Push every 6 hours PRN  ondansetron Injectable 4 milliGRAM(s) IV Push once  pancrelipase  (CREON 24,000 Lipase Units) 1 Capsule(s) Oral three times a day with meals  Parenteral Nutrition - Adult 1 Each TPN Continuous <Continuous>  Parenteral Nutrition - Adult 1 Each TPN Continuous <Continuous>  potassium chloride  20 mEq/100 mL IVPB 20 milliEquivalent(s) IV Intermittent every 2 hours  venlafaxine XR. 300 milliGRAM(s) Oral daily      Objective:  Vital Signs Last 24 Hrs  T(C): 36.3 (14 May 2024 12:00), Max: 36.3 (14 May 2024 06:08)  T(F): 97.3 (14 May 2024 12:00), Max: 97.3 (14 May 2024 06:08)  HR: 115 (14 May 2024 11:00) (111 - 115)  BP: 141/64 (14 May 2024 11:00) (115/59 - 155/72)  BP(mean): 92 (14 May 2024 11:00) (78 - 104)  RR: 23 (14 May 2024 11:00) (15 - 27)  SpO2: 100% (14 May 2024 11:00) (94% - 100%)    Parameters below as of 14 May 2024 12:00  Patient On (Oxygen Delivery Method): nasal cannula w/ humidification  O2 Flow (L/min): 2      PHYSICAL EXAM:  Constitutional: Well-developed, well nourished  Eyes:PATRICIA, EOMI	  Neck:no JVD, no lymphadenopathy, supple  Respiratory: No respiratory distress.   Gastrointestinal:soft, (+) BS, no HSM, ostomy in place  Extremities: no e/e/c. Knee ROM intact without pain.   Lines: RIJ central line clean appearing, no erythema or discharge.       LABS:                        8.6    13.50 )-----------( 41       ( 14 May 2024 07:50 )             26.9     05-14    145  |  102  |  82<H>  ----------------------------<  240<H>  3.4<L>   |  32<H>  |  1.73<H>    Ca    8.6      14 May 2024 07:50  Phos  3.7     05-14  Mg     2.3     05-14    TPro  5.6<L>  /  Alb  3.2<L>  /  TBili  9.5<H>  /  DBili  6.5<H>  /  AST  153<H>  /  ALT  88<H>  /  AlkPhos  105  05-14    PT/INR - ( 14 May 2024 03:05 )   PT: 17.0 sec;   INR: 1.51          PTT - ( 14 May 2024 03:05 )  PTT:32.0 sec  Urinalysis Basic - ( 14 May 2024 07:50 )    Color: x / Appearance: x / SG: x / pH: x  Gluc: 240 mg/dL / Ketone: x  / Bili: x / Urobili: x   Blood: x / Protein: x / Nitrite: x   Leuk Esterase: x / RBC: x / WBC x   Sq Epi: x / Non Sq Epi: x / Bacteria: x          MICROBIOLOGY:  Reviewed      RADIOLOGY & ADDITIONAL STUDIES:

## 2024-05-14 NOTE — PROGRESS NOTE ADULT - SUBJECTIVE AND OBJECTIVE BOX
Hem Onc    INTERVAL HISTORY   The patient feels OK. Some nausea, he slept better. No bleeding overnight    s/p 1 unit PRBC on 5/12    79 yo man with a history of AFib/Flutter s/p DCCV, Crohn's, prior ileocecostomy and open appendectomy, extensive abdominal surgeries and ileostomy presents with persistent anemia     MEDICATIONS  (STANDING):  albumin human 25% IVPB 50 milliLiter(s) IV Intermittent every 8 hours  albuterol/ipratropium for Nebulization 3 milliLiter(s) Nebulizer every 6 hours  atorvastatin 20 milliGRAM(s) Oral at bedtime  bacitracin   Ointment 1 Application(s) Topical two times a day  ceFAZolin   IVPB 2000 milliGRAM(s) IV Intermittent every 12 hours  chlorhexidine 2% Cloths 1 Application(s) Topical daily  cholestyramine Powder (Sugar-Free) 4 Gram(s) Oral every 24 hours  dextrose 10% Bolus 125 milliLiter(s) IV Bolus once  dextrose 5%. 1000 milliLiter(s) (100 mL/Hr) IV Continuous <Continuous>  dextrose 5%. 1000 milliLiter(s) (50 mL/Hr) IV Continuous <Continuous>  dextrose 50% Injectable 25 Gram(s) IV Push once  dextrose 50% Injectable 12.5 Gram(s) IV Push once  glucagon  Injectable 1 milliGRAM(s) IntraMuscular once  hydrocortisone Enema 100 milliGRAM(s) Rectal every 12 hours  insulin lispro (ADMELOG) corrective regimen sliding scale   SubCutaneous every 6 hours  levothyroxine Injectable 40 MICROGram(s) IV Push <User Schedule>  lipid, fat emulsion (Fish Oil and Plant Based) 20% Infusion 0.51 Gm/kG/Day (20.83 mL/Hr) IV Continuous <Continuous>  melatonin 5 milliGRAM(s) Oral at bedtime  pancrelipase  (CREON 24,000 Lipase Units) 1 Capsule(s) Oral three times a day with meals  Parenteral Nutrition - Adult 1 Each TPN Continuous <Continuous>  Parenteral Nutrition - Adult 1 Each TPN Continuous <Continuous>  venlafaxine XR. 300 milliGRAM(s) Oral daily    MEDICATIONS  (PRN):  dextrose Oral Gel 15 Gram(s) Oral once PRN Blood Glucose LESS THAN 70 milliGRAM(s)/deciliter    Vital Signs Last 24 Hrs  T(C): 36.3 (14 May 2024 06:08), Max: 36.6 (13 May 2024 08:06)  T(F): 97.3 (14 May 2024 06:08), Max: 97.8 (13 May 2024 08:06)  HR: 114 (14 May 2024 07:00) (96 - 114)  BP: 155/72 (14 May 2024 07:00) (106/55 - 155/72)  BP(mean): 104 (14 May 2024 07:00) (75 - 104)  RR: 27 (14 May 2024 07:00) (15 - 27)  SpO2: 100% (14 May 2024 07:00) (90% - 100%)    Parameters below as of 14 May 2024 07:00  Patient On (Oxygen Delivery Method): nasal cannula w/ humidification  O2 Flow (L/min): 2    Physical Exam:  Gen:  AAOx3  Skin:  No jaundice, scattered ecchymosis  Lungs no accessory use  ABD Ileostomy present, stoma is pink and appears healthy.  No blood in ostomy bag this am  Bruising noted on bilateral arms and lower extremities   EXT; bilateral LE and scrotal edema               LABS:                          8.6    12.75 )-----------( 44       ( 14 May 2024 03:05 )             26.5         CBC Full  -  ( 14 May 2024 03:05 )  WBC Count : 12.75 K/uL  RBC Count : 2.97 M/uL  Hemoglobin : 8.6 g/dL  Hematocrit : 26.5 %  Platelet Count - Automated : 44 K/uL  Mean Cell Volume : 89.2 fl  Mean Cell Hemoglobin : 29.0 pg  Mean Cell Hemoglobin Concentration : 32.5 gm/dL  Auto Neutrophil # : 11.26 K/uL  Auto Lymphocyte # : 0.34 K/uL  Auto Monocyte # : 1.11 K/uL  Auto Eosinophil # : 0.02 K/uL  Auto Basophil # : 0.01 K/uL  Auto Neutrophil % : 87.6 %  Auto Lymphocyte % : 2.7 %  Auto Monocyte % : 8.7 %  Auto Eosinophil % : 0.2 %  Auto Basophil % : 0.1 %        05-14    143  |  99  |  80<H>  ----------------------------<  264<H>  3.5   |  33<H>  |  1.72<H>    Ca    8.4      14 May 2024 03:05  Phos  3.9     05-14  Mg     2.3     05-14    TPro  5.6<L>  /  Alb  3.2<L>  /  TBili  9.5<H>  /  DBili  6.5<H>  /  AST  153<H>  /  ALT  88<H>  /  AlkPhos  105  05-14        PT/INR - ( 14 May 2024 03:05 )   PT: 17.0 sec;   INR: 1.51          PTT - ( 14 May 2024 03:05 )  PTT:32.0 sec

## 2024-05-14 NOTE — PROGRESS NOTE ADULT - ASSESSMENT
Patient is a 79yo M with complicated history,, PMH of AFib/Flutter s/p DCCV, Crohn's, prior ileocectomy and open appendectomy, extensive abdominal surgeries and ileostomy, short-bowel syndrome, hypothyroidism, depression, recently seen at the Emergency Department for blood transfusion and multiple recent admissions for transfusions who presents as a direct admission for recurrent anemia. FFP x 3 on May 3rd.     clinically better, no bleeding overnight  Factors IX and XI are low consistent with liver dysfunction. Low-normal Fibrinogen    elevated FVIII and VWF which are acute phase reactants  Reason for liver dysfunction is unexplained.  would trend fibrinogen with low threshold for cryo if fibrinogen decreasing  Bleeding is likely associated with liver dysregulation and decreased factor production.   Continue Vit K with TPN.  Recommend FFP and PRBCs as needed for active bleeding  Worsening thrombocytopenia noted  Plt transfusion for active bleeding, would ideally keep plt > 50,000      Thank you      Shruthi Thompson MD  for Dr Reyes Villatoro   [phone]

## 2024-05-15 NOTE — PROGRESS NOTE ADULT - SUBJECTIVE AND OBJECTIVE BOX
78yMale patient PMHx of AFib/Flutter 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 Prolonged hospital course significant for recurrent AKIs, cholestatic transaminitis s/p perc cony, prior DVTs, PNAs, fungemia, sinus pauses s/p pacemaker and recurrent bleeding from fistula recently s/p IR embolization of L branch of inferior epigastric artery via R groin access (4/16), with mutiple presentations afterwards for recurrent bleeding at fistula site requiring transfusions. Found to have likely line associated MSSA bacteremia.       ROS:  CONSTITUTIONAL:  Negative fever or chills, feels well, good appetite  EYES:  Negative  blurry vision or double vision  CARDIOVASCULAR:  Negative for chest pain or palpitations  RESPIRATORY:  Negative for cough, wheezing, or SOB   GASTROINTESTINAL:  Negative for nausea, vomiting, diarrhea, constipation, or abdominal pain  GENITOURINARY:  Negative frequency, urgency or dysuria  NEUROLOGIC:  No headache, confusion, dizziness, lightheadedness    Allergies    penicillin (Angioedema)    Intolerances        ANTIBIOTICS/RELEVANT:  antimicrobials  ceFAZolin   IVPB 2000 milliGRAM(s) IV Intermittent once  ceFAZolin   IVPB 2000 milliGRAM(s) IV Intermittent every 8 hours  ceFAZolin   IVPB        immunologic:    OTHER:  albuterol/ipratropium for Nebulization 3 milliLiter(s) Nebulizer every 6 hours  atorvastatin 20 milliGRAM(s) Oral at bedtime  bacitracin   Ointment 1 Application(s) Topical two times a day  chlorhexidine 2% Cloths 1 Application(s) Topical daily  cholestyramine Powder (Sugar-Free) 4 Gram(s) Oral every 24 hours  dextrose 10% Bolus 125 milliLiter(s) IV Bolus once  dextrose 5%. 1000 milliLiter(s) IV Continuous <Continuous>  dextrose 5%. 1000 milliLiter(s) IV Continuous <Continuous>  dextrose 50% Injectable 25 Gram(s) IV Push once  dextrose 50% Injectable 12.5 Gram(s) IV Push once  dextrose Oral Gel 15 Gram(s) Oral once PRN  glucagon  Injectable 1 milliGRAM(s) IntraMuscular once  hydrocortisone Enema 100 milliGRAM(s) Rectal every 12 hours  insulin lispro (ADMELOG) corrective regimen sliding scale   SubCutaneous every 6 hours  levothyroxine Injectable 40 MICROGram(s) IV Push <User Schedule>  LORazepam   Injectable 0.25 milliGRAM(s) IV Push daily PRN  melatonin 5 milliGRAM(s) Oral at bedtime  metoprolol tartrate Injectable 5 milliGRAM(s) IV Push every 6 hours  nystatin Powder 1 Application(s) Topical <User Schedule>  Omegaven 10 g/100 mL 56 Gram(s) 56 Gram(s) IV Continuous <Continuous>  ondansetron Injectable 4 milliGRAM(s) IV Push every 6 hours PRN  pancrelipase  (CREON 24,000 Lipase Units) 1 Capsule(s) Oral three times a day with meals  Parenteral Nutrition - Adult 1 Each TPN Continuous <Continuous>  Parenteral Nutrition - Adult 1 Each TPN Continuous <Continuous>  ursodiol Capsule 300 milliGRAM(s) Oral every 8 hours  venlafaxine XR. 300 milliGRAM(s) Oral daily      Objective:  Vital Signs Last 24 Hrs  T(C): 36.6 (15 May 2024 05:00), Max: 36.8 (14 May 2024 21:18)  T(F): 97.9 (15 May 2024 05:00), Max: 98.2 (14 May 2024 21:18)  HR: 110 (15 May 2024 15:00) (106 - 121)  BP: 102/54 (15 May 2024 15:00) (97/53 - 134/64)  BP(mean): 74 (15 May 2024 15:00) (71 - 99)  RR: 22 (15 May 2024 15:00) (16 - 26)  SpO2: 100% (15 May 2024 15:00) (95% - 100%)    Parameters below as of 15 May 2024 15:00  Patient On (Oxygen Delivery Method): nasal cannula w/ humidification  O2 Flow (L/min): 2      PHYSICAL EXAM:  Constitutional: Well-developed, well nourished  Eyes:PATRICIA, EOMI	  Neck:no JVD, no lymphadenopathy, supple  Respiratory: No respiratory distress.   Gastrointestinal:soft, (+) BS, no HSM, ostomy in place  Extremities: no e/e/c. Knee ROM intact without pain.   Lines: RIJ central line clean appearing, no erythema or discharge.         LABS:                        8.4    13.55 )-----------( 38       ( 15 May 2024 05:30 )             25.9     05-15    141  |  101  |  72<H>  ----------------------------<  199<H>  4.1   |  32<H>  |  1.57<H>    Ca    8.3<L>      15 May 2024 05:30  Phos  3.4     05-15  Mg     2.1     05-15    TPro  5.4<L>  /  Alb  2.9<L>  /  TBili  9.7<H>  /  DBili  6.6<H>  /  AST  127<H>  /  ALT  55<H>  /  AlkPhos  102  05-15    PT/INR - ( 15 May 2024 05:30 )   PT: 17.1 sec;   INR: 1.52          PTT - ( 15 May 2024 05:30 )  PTT:34.4 sec  Urinalysis Basic - ( 15 May 2024 05:30 )    Color: x / Appearance: x / SG: x / pH: x  Gluc: 199 mg/dL / Ketone: x  / Bili: x / Urobili: x   Blood: x / Protein: x / Nitrite: x   Leuk Esterase: x / RBC: x / WBC x   Sq Epi: x / Non Sq Epi: x / Bacteria: x          MICROBIOLOGY:        RADIOLOGY & ADDITIONAL STUDIES:

## 2024-05-15 NOTE — PROGRESS NOTE ADULT - ASSESSMENT
Patient is a 77yo M with complicated history,, PMH of AFib/Flutter s/p DCCV, Crohn's, prior ileocectomy and open appendectomy, extensive abdominal surgeries and ileostomy, short-bowel syndrome, hypothyroidism, depression, recently seen at the Emergency Department for blood transfusion and multiple recent admissions for transfusions who presents as a direct admission for recurrent anemia. FFP x 3 on May 3rd.     Factors IX and XI are low consistent with liver dysfunction. Low-normal Fibrinogen    elevated FVIII and VWF which are acute phase reactants  Reason for liver dysfunction is unexplained.  would trend fibrinogen with low threshold for cryo if fibrinogen decreasing  Bleeding is likely associated with liver dysregulation and decreased factor production.       + recurrent bleeding in ostomy this am  Worsening thrombocytopenia noted  Recommend platelet transfusion and FFP  PRBC as clinically indicated or Hgb <7      Thank you      Shruthi Thompson MD  for Dr Reyes Villatoro   [phone]

## 2024-05-15 NOTE — PROGRESS NOTE ADULT - SUBJECTIVE AND OBJECTIVE BOX
reports of some bleed overnight but not as profuse as it had been  Hgb stable  current bags does not have teetee blood  on TPN  continue antibiotics  continue steroid enema

## 2024-05-15 NOTE — PROGRESS NOTE ADULT - SUBJECTIVE AND OBJECTIVE BOX
Hem Onc    INTERVAL HISTORY   Significant bleeding with clots started around 6 am  NO pain  s/p 1 unit PRBC on 5/12    77 yo man with a history of AFib/Flutter s/p DCCV, Crohn's, prior ileocecostomy and open appendectomy, extensive abdominal surgeries and ileostomy presents with persistent anemia     MEDICATIONS  (STANDING):  albumin human 25% IVPB 50 milliLiter(s) IV Intermittent every 8 hours  albuterol/ipratropium for Nebulization 3 milliLiter(s) Nebulizer every 6 hours  atorvastatin 20 milliGRAM(s) Oral at bedtime  bacitracin   Ointment 1 Application(s) Topical two times a day  ceFAZolin   IVPB 2000 milliGRAM(s) IV Intermittent every 12 hours  chlorhexidine 2% Cloths 1 Application(s) Topical daily  cholestyramine Powder (Sugar-Free) 4 Gram(s) Oral every 24 hours  dextrose 10% Bolus 125 milliLiter(s) IV Bolus once  dextrose 5%. 1000 milliLiter(s) (100 mL/Hr) IV Continuous <Continuous>  dextrose 5%. 1000 milliLiter(s) (50 mL/Hr) IV Continuous <Continuous>  dextrose 50% Injectable 25 Gram(s) IV Push once  dextrose 50% Injectable 12.5 Gram(s) IV Push once  glucagon  Injectable 1 milliGRAM(s) IntraMuscular once  hydrocortisone Enema 100 milliGRAM(s) Rectal every 12 hours  insulin lispro (ADMELOG) corrective regimen sliding scale   SubCutaneous every 6 hours  levothyroxine Injectable 40 MICROGram(s) IV Push <User Schedule>  lipid, fat emulsion (Fish Oil and Plant Based) 20% Infusion 0.51 Gm/kG/Day (20.83 mL/Hr) IV Continuous <Continuous>  melatonin 5 milliGRAM(s) Oral at bedtime  pancrelipase  (CREON 24,000 Lipase Units) 1 Capsule(s) Oral three times a day with meals  Parenteral Nutrition - Adult 1 Each TPN Continuous <Continuous>  Parenteral Nutrition - Adult 1 Each TPN Continuous <Continuous>  venlafaxine XR. 300 milliGRAM(s) Oral daily    MEDICATIONS  (PRN):  dextrose Oral Gel 15 Gram(s) Oral once PRN Blood Glucose LESS THAN 70 milliGRAM(s)/deciliter    Vital Signs Last 24 Hrs  T(C): 36.7 (15 May 2024 01:03), Max: 36.8 (14 May 2024 21:18)  T(F): 98.1 (15 May 2024 01:03), Max: 98.2 (14 May 2024 21:18)  HR: 107 (15 May 2024 06:40) (107 - 121)  BP: 110/56 (15 May 2024 04:00) (104/56 - 144/65)  BP(mean): 80 (15 May 2024 04:00) (76 - 99)  RR: 20 (15 May 2024 04:00) (16 - 23)  SpO2: 100% (15 May 2024 06:40) (96% - 100%)    Parameters below as of 15 May 2024 06:40  Patient On (Oxygen Delivery Method): nasal cannula w/ humidification  O2 Flow (L/min): 2      Physical Exam:  Gen:  AAOx3  Skin:  No jaundice, scattered ecchymosis  Lungs no accessory use  ABD Ileostomy present, stoma is pink and appears healthy.  + fresh blood with clots in ostomy bag   Bruising noted on bilateral arms and lower extremities   EXT; bilateral LE and scrotal edema               LABS:                                     8.4    13.55 )-----------( 38       ( 15 May 2024 05:30 )             25.9         CBC Full  -  ( 15 May 2024 05:30 )  WBC Count : 13.55 K/uL  RBC Count : 2.93 M/uL  Hemoglobin : 8.4 g/dL  Hematocrit : 25.9 %  Platelet Count - Automated : 38 K/uL  Mean Cell Volume : 88.4 fl  Mean Cell Hemoglobin : 28.7 pg  Mean Cell Hemoglobin Concentration : 32.4 gm/dL  Auto Neutrophil # : x  Auto Lymphocyte # : x  Auto Monocyte # : x  Auto Eosinophil # : x  Auto Basophil # : x  Auto Neutrophil % : x  Auto Lymphocyte % : x  Auto Monocyte % : x  Auto Eosinophil % : x  Auto Basophil % : x        05-15    141  |  101  |  72<H>  ----------------------------<  199<H>  4.1   |  32<H>  |  1.57<H>    Ca    8.3<L>      15 May 2024 05:30  Phos  3.4     05-15  Mg     2.1     05-15    TPro  5.4<L>  /  Alb  2.9<L>  /  TBili  9.7<H>  /  DBili  6.6<H>  /  AST  127<H>  /  ALT  55<H>  /  AlkPhos  102  05-15        PT/INR - ( 15 May 2024 05:30 )   PT: 17.1 sec;   INR: 1.52          PTT - ( 15 May 2024 05:30 )  PTT:34.4 sec

## 2024-05-15 NOTE — PROGRESS NOTE ADULT - SUBJECTIVE AND OBJECTIVE BOX
Interval Events:  Given ativan x 1 for anxiety overnight.   Patient seen and examined at bedside.      Allergies    penicillin (Angioedema)    Intolerances        Vital Signs Last 24 Hrs  T(C): 36.7 (15 May 2024 01:03), Max: 36.8 (14 May 2024 21:18)  T(F): 98.1 (15 May 2024 01:03), Max: 98.2 (14 May 2024 21:18)  HR: 107 (15 May 2024 06:40) (107 - 121)  BP: 110/56 (15 May 2024 04:00) (104/56 - 144/65)  BP(mean): 80 (15 May 2024 04:00) (76 - 99)  RR: 20 (15 May 2024 04:00) (16 - 23)  SpO2: 100% (15 May 2024 06:40) (96% - 100%)    Parameters below as of 15 May 2024 06:40  Patient On (Oxygen Delivery Method): nasal cannula w/ humidification  O2 Flow (L/min): 2      05-14 @ 07:01  -  05-15 @ 07:00  --------------------------------------------------------  IN: 3236.6 mL / OUT: 5535 mL / NET: -2298.4 mL      05-14 @ 07:01  -  05-15 @ 07:00  --------------------------------------------------------  IN: 3236.6 mL / OUT: 5535 mL / NET: -2298.4 mL        Physical Exam:     General: Resting comfortably in bed, NAD  Neuro: A&Ox3, no focal deficits  HEENT: MMM  Respiratory: non labored breathing, no respiratory distress. Lungs clear to auscultation with diminished sounds at bases  Cardiovascular: Irregularly irregular  Gastrointestinal: soft, NTND abdomen. Midline ECF with bilio-sanguinous output, with granulation tissue surrounding. Ileostomy with minimal output.  Genitourinary: Poole with dark yellow urine.   Extremities: 3+ pitting edema in BLE to flank. WWP. + UExt: 1+ pitting edema   Skin: jaundiced.   Lines: R IJ TLC present.      LABS:      CBC Full  -  ( 15 May 2024 05:30 )  WBC Count : 13.55 K/uL  RBC Count : 2.93 M/uL  Hemoglobin : 8.4 g/dL  Hematocrit : 25.9 %  Platelet Count - Automated : 38 K/uL  Mean Cell Volume : 88.4 fl  Mean Cell Hemoglobin : 28.7 pg  Mean Cell Hemoglobin Concentration : 32.4 gm/dL  Auto Neutrophil # : x  Auto Lymphocyte # : x  Auto Monocyte # : x  Auto Eosinophil # : x  Auto Basophil # : x  Auto Neutrophil % : x  Auto Lymphocyte % : x  Auto Monocyte % : x  Auto Eosinophil % : x  Auto Basophil % : x    05-15    141  |  101  |  72<H>  ----------------------------<  199<H>  4.1   |  32<H>  |  1.57<H>    Ca    8.3<L>      15 May 2024 05:30  Phos  3.4     05-15  Mg     2.1     05-15    TPro  5.4<L>  /  Alb  2.9<L>  /  TBili  9.7<H>  /  DBili  6.6<H>  /  AST  127<H>  /  ALT  55<H>  /  AlkPhos  102  05-15    PT/INR - ( 15 May 2024 05:30 )   PT: 17.1 sec;   INR: 1.52          PTT - ( 15 May 2024 05:30 )  PTT:34.4 sec      Urinalysis Basic - ( 15 May 2024 05:30 )    Color: x / Appearance: x / SG: x / pH: x  Gluc: 199 mg/dL / Ketone: x  / Bili: x / Urobili: x   Blood: x / Protein: x / Nitrite: x   Leuk Esterase: x / RBC: x / WBC x   Sq Epi: x / Non Sq Epi: x / Bacteria: x              RADIOLOGY & ADDITIONAL STUDIES (The following images were personally reviewed):          A/p: A/p: 78 year old male with past medical history of AFib/Flutter 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 Prolonged hospital course significant for recurrent AKIs, cholestatic transaminitis s/p perc cony, prior DVTs, PNAs, fungemia, sinus pauses s/p pacemaker and recurrent bleeding from fistula most recently s/p IR embolization of L branch of inferior epigastric artery via R groin access (4/16), with multiple readmissions for continued bleeding from fistula and ostomy sites. S/p ileoscopy on noting an ulcer w/ clot proximal to stoma w/ hemoclip was applied (5/1). Admitted for symptomatic anemia secondary to bleeding from fistula site and ileostomy sites. Transferred to SICU 5/8 overnight for 2L blood loss from fistula site with subsequent hypotension. Now s/p ileoscopy, noting erythematous patch of bowel just deep to the ECF, without any active bleeding (5/9). S/p IR angiography noting  several areas of hyperemia in the jejunum adjacent to the stoma however no evidence of active bleeding (5/9).     Neuro: Dilaudid prn for pain. Hx: Anxiety: Continue Effexor. Ambien on hold   CV: Hemorrhagic shock on 5/8, 2/2 EC Fistula bleed, now resolved. 2L of bright red blood. S/p total of 3 units of PRBCs (overnight 5/8), responded appropriately 8.3 (5). Keep MAPs > 65. Hx: AF: Restart Metoprolol today for continued tachycardia given BP improved. HLD: Continue Atorvastatin. Total body overload: Cont Lasix for goal I/O negative 1-2L/day, Cont Albumin 25%q8 to help mobilization. + MSSA bacteremia: F/u Echo for vegetation negative on 5/13. Hgb dec this am will repeat today.   Pulm: Pleural Effusions b/l Rt improved on 5/12 xray. Left continued to be present. Will Cont to Diurese with Lasix. Satting low 90's on RA, nightly BIPAP at 40%.   GI: CLD. GI Bleed vs bleeding at fistula site: IR 5/9: segmental hyperemia, unable to embolize c/f bowel ischemia. Ileoscopy 5/9:erythematous patch of bowel just deep to the ECF, without active bleeding. CT 5/2 with bowel wall thickening.  Increased Bili: as per Surgical team NTD, no hepatology c/s at this time, US negative for pathology, Cont cholestyramine. Cont Creon Crohns: Holding methylprednisolone IV, started hydrocortisone enemas 5/10 as per GI. Continue TPN/ Lipids. Will hold off on remicade in setting of MSSA bacteremia. portal vein thrombus on duplex from 5/13 - unable to give A/C 2* bleed  : Poole will dc today. ELVI likely from acute blood loss now resolving. Baseline 1.2 - 1.4. Urine Studies/Cystatin C pending.   ID: Bacteremia 2/2 MSSA grown in BCx (5/9) repeat Bld cx's on 5/11 NGTD.  PICC line removed. Echo negative for vegetation. WBC improved afebrile.  + Fungitel: Pt with hx of + blood cx's with candida glabrata from 11/24/23  Start Ancef (5/10- ) d/c: // Cefepime (5/10), Vanc x1 (5/9)  Endo: mISSc. Hx: Hypothyroidism. Continue synthroid.   PPx: SCDs, SQH on hold for bleeding.   Heme: Anemia 2/2 EC fistula bleed. Received a total of 11 units of PRBCs, 2 FFP,2 plt, 1 cryo this admission. Trend CBC. 5 of Vit k removed from TPN on 5/14. Plts Low this am will repeat in afternoon if cont to dec may transfuse.   Wounds: Wound manager present with dressing changes done by Wound care team. Bacitracin to rt arm skin tear  PT: Ordered  Dispo: SICU poss transfer to [facility] intestinal rehab center in future.  Interval Events:  Given ativan x 1 for anxiety overnight.   Patient seen and examined at bedside.      Allergies    penicillin (Angioedema)    Intolerances        Vital Signs Last 24 Hrs  T(C): 36.7 (15 May 2024 01:03), Max: 36.8 (14 May 2024 21:18)  T(F): 98.1 (15 May 2024 01:03), Max: 98.2 (14 May 2024 21:18)  HR: 107 (15 May 2024 06:40) (107 - 121)  BP: 110/56 (15 May 2024 04:00) (104/56 - 144/65)  BP(mean): 80 (15 May 2024 04:00) (76 - 99)  RR: 20 (15 May 2024 04:00) (16 - 23)  SpO2: 100% (15 May 2024 06:40) (96% - 100%)    Parameters below as of 15 May 2024 06:40  Patient On (Oxygen Delivery Method): nasal cannula w/ humidification  O2 Flow (L/min): 2      05-14 @ 07:01  -  05-15 @ 07:00  --------------------------------------------------------  IN: 3236.6 mL / OUT: 5535 mL / NET: -2298.4 mL      05-14 @ 07:01  -  05-15 @ 07:00  --------------------------------------------------------  IN: 3236.6 mL / OUT: 5535 mL / NET: -2298.4 mL        Physical Exam:     General: Resting comfortably in bed, NAD  Neuro: A&Ox3, no focal deficits  HEENT: MMM  Respiratory: non labored breathing, no respiratory distress. Lungs clear to auscultation with diminished sounds at bases  Cardiovascular: Irregularly irregular  Gastrointestinal: soft, NTND abdomen. Midline ECF with bilio-sanguinous output, with granulation tissue surrounding. Ileostomy with minimal output.  Genitourinary: Poole with dark yellow urine.   Extremities: 3+ pitting edema in BLE to flank. WWP. + UExt: 1+ pitting edema   Skin: jaundiced.   Lines: R IJ TLC present.      LABS:      CBC Full  -  ( 15 May 2024 05:30 )  WBC Count : 13.55 K/uL  RBC Count : 2.93 M/uL  Hemoglobin : 8.4 g/dL  Hematocrit : 25.9 %  Platelet Count - Automated : 38 K/uL  Mean Cell Volume : 88.4 fl  Mean Cell Hemoglobin : 28.7 pg  Mean Cell Hemoglobin Concentration : 32.4 gm/dL  Auto Neutrophil # : x  Auto Lymphocyte # : x  Auto Monocyte # : x  Auto Eosinophil # : x  Auto Basophil # : x  Auto Neutrophil % : x  Auto Lymphocyte % : x  Auto Monocyte % : x  Auto Eosinophil % : x  Auto Basophil % : x    05-15    141  |  101  |  72<H>  ----------------------------<  199<H>  4.1   |  32<H>  |  1.57<H>    Ca    8.3<L>      15 May 2024 05:30  Phos  3.4     05-15  Mg     2.1     05-15    TPro  5.4<L>  /  Alb  2.9<L>  /  TBili  9.7<H>  /  DBili  6.6<H>  /  AST  127<H>  /  ALT  55<H>  /  AlkPhos  102  05-15    PT/INR - ( 15 May 2024 05:30 )   PT: 17.1 sec;   INR: 1.52          PTT - ( 15 May 2024 05:30 )  PTT:34.4 sec      Urinalysis Basic - ( 15 May 2024 05:30 )    Color: x / Appearance: x / SG: x / pH: x  Gluc: 199 mg/dL / Ketone: x  / Bili: x / Urobili: x   Blood: x / Protein: x / Nitrite: x   Leuk Esterase: x / RBC: x / WBC x   Sq Epi: x / Non Sq Epi: x / Bacteria: x              RADIOLOGY & ADDITIONAL STUDIES (The following images were personally reviewed):          A/p: A/p: 78 year old male with past medical history of AFib/Flutter 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 Prolonged hospital course significant for recurrent AKIs, cholestatic transaminitis s/p perc cony, prior DVTs, PNAs, fungemia, sinus pauses s/p pacemaker and recurrent bleeding from fistula most recently s/p IR embolization of L branch of inferior epigastric artery via R groin access (4/16), with multiple readmissions for continued bleeding from fistula and ostomy sites. S/p ileoscopy on noting an ulcer w/ clot proximal to stoma w/ hemoclip was applied (5/1). Admitted for symptomatic anemia secondary to bleeding from fistula site and ileostomy sites. Transferred to SICU 5/8 overnight for 2L blood loss from fistula site with subsequent hypotension. Now s/p ileoscopy, noting erythematous patch of bowel just deep to the ECF, without any active bleeding (5/9). S/p IR angiography noting  several areas of hyperemia in the jejunum adjacent to the stoma however no evidence of active bleeding (5/9).     Neuro: Dilaudid prn for pain. Hx: Anxiety: Continue Effexor. Ambien on hold   CV: Hemorrhagic shock on 5/8, 2/2 EC Fistula bleed, now resolved. 2L of bright red blood. S/p total of 3 units of PRBCs (overnight 5/8), responded appropriately 8.3 (5). Keep MAPs > 65. Hx: AF: Restart Metoprolol today for continued tachycardia given BP improved. HLD: Continue Atorvastatin. Total body overload: Cont Lasix for goal I/O negative 1-2L/day, Cont Albumin 25%q8 to help mobilization. + MSSA bacteremia: F/u Echo for vegetation negative on 5/13. Hgb dec this am will repeat today.   Pulm: Pleural Effusions b/l Rt improved on 5/12 xray. Left continued to be present. Will Cont to Diurese with Lasix. Satting low 90's on RA, nightly BIPAP at 40%.   GI: CLD. GI Bleed vs bleeding at fistula site: IR 5/9: segmental hyperemia, unable to embolize c/f bowel ischemia. Ileoscopy 5/9:erythematous patch of bowel just deep to the ECF, without active bleeding. CT 5/2 with bowel wall thickening.  Increased Bili: as per Surgical team NTD, no hepatology c/s at this time, US negative for pathology, Cont cholestyramine.restart Ursodiol.  Cont Creon Crohns: Holding methylprednisolone IV, started hydrocortisone enemas 5/10 as per GI. Continue TPN/ Lipids. Will hold off on remicade in setting of MSSA bacteremia. portal vein thrombus on duplex from 5/13 - unable to give A/C 2* bleed   : Poole will dc today. ELVI likely from acute blood loss now resolving. Baseline 1.2 - 1.4. Urine Studies/Cystatin C pending.   ID: Bacteremia 2/2 MSSA grown in BCx (5/9) repeat Bld cx's on 5/11 NGTD.  PICC line removed. Echo negative for vegetation. WBC improved afebrile.  + Fungitel: Pt with hx of + blood cx's with candida glabrata from 11/24/23  Start Ancef (5/10- ) d/c: // Cefepime (5/10), Vanc x1 (5/9)  Endo: mISSc. Hx: Hypothyroidism. Continue synthroid.   PPx: SCDs, SQH on hold for bleeding.   Heme: Anemia 2/2 EC fistula bleed. Received a total of 11 units of PRBCs, 2 FFP,2 plt, 1 cryo this admission. Trend CBC. 5 of Vit k removed from TPN on 5/14. Plts Low this am will repeat in afternoon if cont to dec may transfuse.   Wounds: Wound manager present with dressing changes done by Wound care team. Bacitracin to rt arm skin tear  PT: Ordered  Dispo: SICU poss transfer to [facility] intestinal rehab center in future.  Interval Events:  Given ativan x 1 for anxiety overnight.   Patient seen and examined at bedside.      Allergies    penicillin (Angioedema)    Intolerances        Vital Signs Last 24 Hrs  T(C): 36.7 (15 May 2024 01:03), Max: 36.8 (14 May 2024 21:18)  T(F): 98.1 (15 May 2024 01:03), Max: 98.2 (14 May 2024 21:18)  HR: 107 (15 May 2024 06:40) (107 - 121)  BP: 110/56 (15 May 2024 04:00) (104/56 - 144/65)  BP(mean): 80 (15 May 2024 04:00) (76 - 99)  RR: 20 (15 May 2024 04:00) (16 - 23)  SpO2: 100% (15 May 2024 06:40) (96% - 100%)    Parameters below as of 15 May 2024 06:40  Patient On (Oxygen Delivery Method): nasal cannula w/ humidification  O2 Flow (L/min): 2      05-14 @ 07:01  -  05-15 @ 07:00  --------------------------------------------------------  IN: 3236.6 mL / OUT: 5535 mL / NET: -2298.4 mL      05-14 @ 07:01  -  05-15 @ 07:00  --------------------------------------------------------  IN: 3236.6 mL / OUT: 5535 mL / NET: -2298.4 mL        Physical Exam:     General: Resting comfortably in bed, NAD  Neuro: A&Ox3, no focal deficits  HEENT: MMM  Respiratory: non labored breathing, no respiratory distress. Lungs clear to auscultation with diminished sounds at bases  Cardiovascular: Irregularly irregular  Gastrointestinal: soft, NTND abdomen. Midline ECF with bilio-sanguinous output this am with no defined area of bleeding. Ileostomy with minimal blood tinged output.  Genitourinary: Poole with Light yellow urine.  Scrotum and Penile Edema.   Extremities: 3+ pitting edema in BLE to flank. WWP. + UExt: trace pitting edema   Skin: jaundiced.   Lines: R IJ TLC present.      LABS:      CBC Full  -  ( 15 May 2024 05:30 )  WBC Count : 13.55 K/uL  RBC Count : 2.93 M/uL  Hemoglobin : 8.4 g/dL  Hematocrit : 25.9 %  Platelet Count - Automated : 38 K/uL  Mean Cell Volume : 88.4 fl  Mean Cell Hemoglobin : 28.7 pg  Mean Cell Hemoglobin Concentration : 32.4 gm/dL  Auto Neutrophil # : x  Auto Lymphocyte # : x  Auto Monocyte # : x  Auto Eosinophil # : x  Auto Basophil # : x  Auto Neutrophil % : x  Auto Lymphocyte % : x  Auto Monocyte % : x  Auto Eosinophil % : x  Auto Basophil % : x    05-15    141  |  101  |  72<H>  ----------------------------<  199<H>  4.1   |  32<H>  |  1.57<H>    Ca    8.3<L>      15 May 2024 05:30  Phos  3.4     05-15  Mg     2.1     05-15    TPro  5.4<L>  /  Alb  2.9<L>  /  TBili  9.7<H>  /  DBili  6.6<H>  /  AST  127<H>  /  ALT  55<H>  /  AlkPhos  102  05-15    PT/INR - ( 15 May 2024 05:30 )   PT: 17.1 sec;   INR: 1.52          PTT - ( 15 May 2024 05:30 )  PTT:34.4 sec      Urinalysis Basic - ( 15 May 2024 05:30 )    Color: x / Appearance: x / SG: x / pH: x  Gluc: 199 mg/dL / Ketone: x  / Bili: x / Urobili: x   Blood: x / Protein: x / Nitrite: x   Leuk Esterase: x / RBC: x / WBC x   Sq Epi: x / Non Sq Epi: x / Bacteria: x              RADIOLOGY & ADDITIONAL STUDIES (The following images were personally reviewed):        A/p: 78 year old male with past medical history of AFib/Flutter 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 Prolonged hospital course significant for recurrent AKIs, cholestatic transaminitis s/p perc cony, prior DVTs, PNAs, fungemia, sinus pauses s/p pacemaker and recurrent bleeding from fistula most recently s/p IR embolization of L branch of inferior epigastric artery via R groin access (4/16), with multiple readmissions for continued bleeding from fistula and ostomy sites. S/p ileoscopy on noting an ulcer w/ clot proximal to stoma w/ hemoclip was applied (5/1). Admitted for symptomatic anemia secondary to bleeding from fistula site and ileostomy sites. Transferred to SICU 5/8 overnight for 2L blood loss from fistula site with subsequent hypotension. Now s/p ileoscopy, noting erythematous patch of bowel just deep to the ECF, without any active bleeding (5/9). S/p IR angiography noting  several areas of hyperemia in the jejunum adjacent to the stoma however no evidence of active bleeding (5/9).     Neuro: Dilaudid prn for pain. Hx: Anxiety: Continue Effexor. Ambien on hold   CV: Hemorrhagic shock on 5/8, 2/2 EC Fistula bleed, now resolved. 2L of bright red blood. S/p total of 3 units of PRBCs (overnight 5/8), responded appropriately 8.3 (5). Keep MAPs > 65. Hx: AF: Restart Metoprolol today for continued tachycardia given BP improved. HLD: Continue Atorvastatin. Total body overload: Cont Lasix for goal I/O negative 1-2L/day, Will give diamox x1 as well this am + MSSA bacteremia: F/u Echo for vegetation negative on 5/13.   Pulm: Pleural Effusions b/l Rt improved on 5/12 xray. Left continued to be present. Will Cont to Diurese with Lasix. Satting low 90's on RA, nightly BIPAP at 40%.   GI: CLD. GI Bleed vs bleeding at fistula site: IR 5/9: segmental hyperemia, unable to embolize c/f bowel ischemia. Ileoscopy 5/9:erythematous patch of bowel just deep to the ECF, without active bleeding. CT 5/2 with bowel wall thickening.  Increased Bili: as per Surgical team NTD, no hepatology c/s at this time, US negative for pathology, Cont cholestyramine. restart Ursodiol.  Cont Creon Crohns: Holding methylprednisolone IV, started hydrocortisone enemas 5/10 as per GI. Continue TPN/ change Lipids to omegaven. Will hold off on remicade in setting of MSSA bacteremia. portal vein thrombus on duplex from 5/13 - unable to give A/C 2* bleed   : Poole will dc today. ELVI likely from acute blood loss now resolving. Baseline 1.2 - 1.4.   ID: Bacteremia 2/2 MSSA grown in BCx (5/9) repeat Bld cx's on 5/11 NGTD.  PICC line removed. Echo negative for vegetation. WBC improved afebrile.  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 Pt with hx of + blood cx's with candida glabrata from 11/24/23  Start Ancef (5/10- ) d/c: // Cefepime (5/10), Vanc x1 (5/9)  Endo: mISSc. Hx: Hypothyroidism. Continue synthroid.   PPx: SCDs, SQH on hold for bleeding.   Heme: Anemia 2/2 EC fistula bleed. Received a total of 11 units of PRBCs, 2 FFP,3 plt, 1 cryo this admission. Trend CBC. 5 of Vit k removed from TPN on 5/14. Plts Low this am if signs of bleeeding will transfuse  Wounds: Wound manager present with dressing changes done by Wound care team. Bacitracin to rt arm skin tear  PT: Ordered OOBTC daily.   Dispo: SICU poss transfer to [facility] intestinal rehab center in future.

## 2024-05-15 NOTE — PROGRESS NOTE ADULT - ASSESSMENT
# MSSA bacteremia, hospital onset, line associated  - BCx x2 + on 5/9  - Repeat BCx 5/11 NGTD so far.   - Likely RUE PICC line for chronic TPN as source. This was removed and R IJ TLC placed on 5/9. No signs of metastatic infection on exam, but does have remote bilateral knee arthroplasties (no signs of infection there). Also has loop recorder but these devices are entirely subQ.  - TTE 5/13 w/o vegetation    Recommendations:  - f/u BCx  - Increased cefazolin 2g IV to q8h iso improving renal function.  Ultimate duration TBD but likely minimum 4 weeks given presence of hardware.

## 2024-05-16 NOTE — PROGRESS NOTE ADULT - ASSESSMENT
Cont Rx with antibiotics and diuresis  Long discussion last night with the entire family re surgical option at [facility]

## 2024-05-16 NOTE — OCCUPATIONAL THERAPY INITIAL EVALUATION ADULT - PERTINENT HX OF CURRENT PROBLEM, REHAB EVAL
Patient is a 77yo M with complicated history,, PMH of AFib/Flutter s/p DCCV, Crohn's, prior ileocectomy and open appendectomy, extensive abdominal surgeries and ileostomy, short-bowel syndrome, hypothyroidism, depression, recently seen at the Emergency Department for blood transfusion and multiple recent admissions for transfusions who presents as a direct admission for recurrent anemia. FFP x 3 on May 3rd.

## 2024-05-16 NOTE — PROGRESS NOTE ADULT - ATTENDING COMMENTS
Clinically well appearing. No signs of metastatic MSSA infection, TTE neg, and bacteremia rapidly cleared. Given presence of knee hardware (no s/o infection there), will treat for 4 weeks from negative BCx, EOT 6/8. Should have removal and replacement of R IJ CVC as soon as feasible, as above, and this preferably should be done at least 2 weeks prior to antibiotic EOT. Clinically well appearing. No signs of metastatic MSSA infection, TTE neg, and bacteremia rapidly cleared. Given presence of knee hardware (no s/o infection there), will treat for 4 weeks from negative BCx, EOT 6/8. Please call ID if patient is discharged prior to abx EOT, to arrange home infusion, however no plans for this currently (patient can follow up with me outpatient if does not complete therapy inpatient). Also, should have removal and replacement of R IJ CVC as soon as feasible, as above, and this preferably should be done at least 2 weeks prior to antibiotic EOT.

## 2024-05-16 NOTE — OCCUPATIONAL THERAPY INITIAL EVALUATION ADULT - GENERAL OBSERVATIONS, REHAB EVAL
Rehab saundra Hall present. RENETTA Webber present. Patient received semisupine in bed +moderate b/l edema, tele, +ostomy, +primafit, ileostomy bag, +b/l SCD's, 2 L O2 via NC, NAD.

## 2024-05-16 NOTE — PROGRESS NOTE ADULT - ASSESSMENT
# MSSA bacteremia, hospital onset, line associated  - BCx x2 + on 5/9  - Repeat BCx 5/11 NGTD so far.   - Likely RUE PICC line for chronic TPN as source. This was removed and R IJ TLC placed on 5/9. No signs of metastatic infection on exam, but does have remote bilateral knee arthroplasties (no signs of infection there). Also has loop recorder but these devices are entirely subQ.  - TTE 5/13 w/o vegetation    Recommendations:  - C/w cefazolin 2g IV to q8h. 4 week total duration from negative BCx. EOT 6/8/24    ID team 1 will sign off at this time. Please reach out with any further questions.     Recommendations not considered final until attending attestation   # MSSA bacteremia, hospital onset, line associated  - BCx x2 + on 5/9  - Repeat BCx 5/11 NGTD so far.   - Likely RUE PICC line for chronic TPN as source. This was removed and R IJ TLC placed on 5/9. No signs of metastatic infection on exam, but does have remote bilateral knee arthroplasties (no signs of infection there). Also has loop recorder but these devices are entirely subQ.  - TTE 5/13 w/o vegetation    Recommendations:  - C/w cefazolin 2g IV to q8h. 4 week total duration from negative BCx. EOT 6/8/24  - Follow pending blood cultures  - R IJ TLC should be replaced prior to abx EOT (preferably 2 weeks prior, by 5/25 at latest) as it was placed while BCx were still positive. No urgent indications for removal.     ID team 1 will sign off at this time. Please reach out if further ID input is desired    Recommendations not considered final until attending attestation

## 2024-05-16 NOTE — OCCUPATIONAL THERAPY INITIAL EVALUATION ADULT - MODIFIED CLINICAL TEST OF SENSORY INTEGRATION IN BALANCE TEST
Patient functionally ambulated 8 feet with RW to the bedside chair with Min A x 2- decreased step length, weight shifting.

## 2024-05-16 NOTE — CHART NOTE - NSCHARTNOTEFT_GEN_A_CORE
Admitting Diagnosis:   Patient is a 78y old  Male who presents with a chief complaint of Symptomatic anemia (16 May 2024 11:06)      PAST MEDICAL & SURGICAL HISTORY:  Essential hypertension  Hypertension      Atrial fibrillation  s/p cardioversion 2010 and 2014  Pt. reports 4 DCCV  Now on Amiodarone 200mg PO bid and Eliquis 5mg PO bid  Last DCCV 4 yrs ago at [facility]      Crohn's disease  s/p partial resection of ileum      Hyperlipidemia      Hypothyroidism      History of depression  On Venlafaxine ER 150mg PO bid      Junctional rhythm      Bradycardia      H/O knee surgery      History of cataract surgery      S/P appendectomy          Current Nutrition Order:   1.9L TPN via PICC @83ml/hr x 24hrs- 370g Dextrose, 136g amino acids, 56g OMEGAVEN; provides 2362 kcals, 136g protein, GIR 2.87 mg/kg/min  Clear liquid diet    PO Intake: Good (%) [   ]  Fair (50-75%) [   ] Poor (<25%) [   ] - N/A    GI Issues: 700cc ostomy output x 24hrs, 1900cc fistula output x 24hrs    Pain: no pain/discomfort noted    Skin Integrity: jaundiced, 3+ pitting edema to BLE to flank, trace pitting edema to upper extremities; EC fistula site, R arm skin tear    I&Os:   05-15-24 @ 07:01  -  05-16-24 @ 07:00  --------------------------------------------------------  IN: 3214.7 mL / OUT: 3930 mL / NET: -715.3 mL    05-16-24 @ 07:01  -  05-16-24 @ 12:36  --------------------------------------------------------  IN: 1155 mL / OUT: 1350 mL / NET: -195 mL        Labs:   05-16    140  |  99  |  72<H>  ----------------------------<  127<H>  3.6   |  32<H>  |  1.60<H>    Ca    8.1<L>      16 May 2024 05:06  Phos  3.8     05-16  Mg     2.1     05-16    TPro  5.6<L>  /  Alb  2.7<L>  /  TBili  8.4<H>  /  DBili  6.2<H>  /  AST  124<H>  /  ALT  39  /  AlkPhos  96  05-16    CAPILLARY BLOOD GLUCOSE      POCT Blood Glucose.: 133 mg/dL (16 May 2024 11:57)  POCT Blood Glucose.: 113 mg/dL (16 May 2024 07:03)  POCT Blood Glucose.: 129 mg/dL (15 May 2024 23:57)  POCT Blood Glucose.: 152 mg/dL (15 May 2024 17:58)      Medications:  MEDICATIONS  (STANDING):  acetaZOLAMIDE  IVPB 250 milliGRAM(s) IV Intermittent once  albuterol/ipratropium for Nebulization 3 milliLiter(s) Nebulizer every 6 hours  atorvastatin 20 milliGRAM(s) Oral at bedtime  bacitracin   Ointment 1 Application(s) Topical two times a day  ceFAZolin   IVPB 2000 milliGRAM(s) IV Intermittent every 8 hours  ceFAZolin   IVPB      chlorhexidine 2% Cloths 1 Application(s) Topical daily  cholestyramine Powder (Sugar-Free) 4 Gram(s) Oral every 24 hours  dextrose 10% Bolus 125 milliLiter(s) IV Bolus once  dextrose 5%. 1000 milliLiter(s) (100 mL/Hr) IV Continuous <Continuous>  dextrose 5%. 1000 milliLiter(s) (50 mL/Hr) IV Continuous <Continuous>  dextrose 50% Injectable 25 Gram(s) IV Push once  dextrose 50% Injectable 12.5 Gram(s) IV Push once  glucagon  Injectable 1 milliGRAM(s) IntraMuscular once  hydrocortisone Enema 100 milliGRAM(s) Rectal every 12 hours  insulin lispro (ADMELOG) corrective regimen sliding scale   SubCutaneous every 6 hours  levothyroxine Injectable 40 MICROGram(s) IV Push <User Schedule>  melatonin 5 milliGRAM(s) Oral at bedtime  metoprolol tartrate Injectable 5 milliGRAM(s) IV Push every 6 hours  nystatin Powder 1 Application(s) Topical <User Schedule>  Omegaven 10grams/100mL 56 Gram(s),IV Solution 560 milliLiter(s) 56 Gram(s) (46.67 mL/Hr) IV Continuous <Continuous>  pancrelipase  (CREON 24,000 Lipase Units) 1 Capsule(s) Oral three times a day with meals  Parenteral Nutrition - Adult 1 Each (83 mL/Hr) TPN Continuous <Continuous>  Parenteral Nutrition - Adult 1 Each (83 mL/Hr) TPN Continuous <Continuous>  potassium chloride  20 mEq/100 mL IVPB 20 milliEquivalent(s) IV Intermittent every 2 hours  ursodiol Capsule 300 milliGRAM(s) Oral every 8 hours  venlafaxine XR. 300 milliGRAM(s) Oral daily    MEDICATIONS  (PRN):  dextrose Oral Gel 15 Gram(s) Oral once PRN Blood Glucose LESS THAN 70 milliGRAM(s)/deciliter  LORazepam   Injectable 0.25 milliGRAM(s) IV Push daily PRN Anxiety  ondansetron Injectable 4 milliGRAM(s) IV Push every 6 hours PRN Nausea and/or Vomiting      Weight:  5/16 89.4kg  5/13 98.7kg  5/10 110.1kg  5/9 98kg    Weight Change:  wt fluctuations anticipated in setting of fluid shifts/edema. Continue daily/weekly weights for trending    Estimated energy needs:   Ideal body weight (83.4kg) used for calculations as pt >100% IBW and BMI <30 per [facility] Standards of Care. Needs estimated for age and adjusted for current clinical status, renal function, increased needs for wound healing, high outputs. Fluid needs per team.    Calories: 5220-4976 kcals based on 25-35 kcals/kg  Protein: 100-166.8g based on 1.2-2.0g protein/kg  Fluid needs per team*    Subjective:   78 year old male with past medical history of AFib/Flutter 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 Prolonged hospital course significant for recurrent AKIs, cholestatic transaminitis s/p perc cony, prior DVTs, PNAs, fungemia, sinus pauses s/p pacemaker and recurrent bleeding from fistula most recently s/p IR embolization of L branch of inferior epigastric artery via R groin access (4/16), with multiple readmissions for continued bleeding from fistula and ostomy sites. S/p ileoscopy on noting an ulcer w/ clot proximal to stoma w/ hemoclip was applied (5/1). Admitted for symptomatic anemia secondary to bleeding from fistula site and ileostomy sites. Transferred to SICU 5/8 overnight for 2L blood loss from fistula site with subsequent hypotension. Now s/p ileoscopy, noting erythematous patch of bowel just deep to the ECF, without any active bleeding (5/9). S/p IR angiography noting  several areas of hyperemia in the jejunum adjacent to the stoma however no evidence of active bleeding (5/9).       Chart reviewed. Pt seen at bedside today on 5EA with IDT during AM rounds. TPN remains primary means to nutrition, also on clear liquid diet. Pt with bowel length <120cm without colon in continuity & high output fistula [>500ml daily]. Insufficient bowel to maintain/restore nutrition status through PO diet alone- unclear amount absorbed from PO diet. TPN changed to 24hr provision, GIR now GIR 2.87 mg/kg/min. Plan to increase amino acid provision to better meet needs as renal function improves. Lipids changed to Omegaven [fish oil] yesterday due to elevated bilirubin/cholestasis--- goal to provide 1g/kg/day lipid infusion [currently providing 0.6g/kg/day]. Noted downtrend in total & direct bilirubin today. Continue MWF copper & zinc in TPN, check serum levels monthly. To recheck vitamin D and consider supplementation. On cholestyramine & ursodiol. RDN will continue to monitor, reassess, and intervene as appropriate.       Previous Nutrition Diagnosis: Altered GI Function related to short bowel syndrome as evidenced by TPN dependence, <120cm bowel without colon in continuity, high output fistula    Active [   ]  Resolved [   ]    If resolved, new PES:     Goal:  Pt will meet at least 75% of protein & energy needs via most appropriate route for nutrition     Recommendations:  1. c/w TPN via PICC as primary means to nutrition- recommend 370g Dextrose, 120g AA, 56g Omegaven; provides provides 2362 kcals, 136g protein, GIR 2.87 mg/kg/min   - provides 28.3 kcals/kg, 21.8 non-protein kcals/kg, 1.6g protein/kg IBW  - monitor renal function, increase amino acid provision to meet needs/account for losses as renal function improves --> goal: 2g/kg IBW  - fluid & lytes per team  - continue to monitor renal & hepatic labs, adjust substrates as indicated  - copper & zinc MWF in TPN bag, recheck serum levels monthly  2. PO diet per MD discretion  - c/w clear liquid diet & monitor tolerance closely  - previously on low fiber diet, resume as medically feasible  - recommend 1pk Ottoniel BID; provides 160 kcals, 14g arginine, 14g glutamine, 5g collagen Fluid & lytes per team  - monitor outputs & adjust for losses prn  4. Weekly lipid panel  - hold / decrease lipid infusion if TG >400  - trend hepatic labs, adjust substrates in bag prn  - increase Omegaven provision as medically feasible - goal: 1g lipid/kg/day  5. Recheck serum Vit D, recommend supplementation as able  6. Daily BMP/Mg/Phos, fingersticks Q4-6hrs  - monitor lytes closely & replete outside bag prn  7. Monitor I&Os  8. Consider UUN & fecal fat studies to monitor needs and/or changes in PO absorption  9. Close monitoring of weights, outputs, labs, adjust TPN as indicated to prevent over/underfeeding    Education: ongoing diet education    Risk Level: High [ X  ] Moderate [   ] Low [   ] Admitting Diagnosis:   Patient is a 78y old  Male who presents with a chief complaint of Symptomatic anemia (16 May 2024 11:06)      PAST MEDICAL & SURGICAL HISTORY:  Essential hypertension  Hypertension      Atrial fibrillation  s/p cardioversion 2010 and 2014  Pt. reports 4 DCCV  Now on Amiodarone 200mg PO bid and Eliquis 5mg PO bid  Last DCCV 4 yrs ago at [facility]      Crohn's disease  s/p partial resection of ileum      Hyperlipidemia      Hypothyroidism      History of depression  On Venlafaxine ER 150mg PO bid      Junctional rhythm      Bradycardia      H/O knee surgery      History of cataract surgery      S/P appendectomy          Current Nutrition Order:   1.9L TPN via PICC @83ml/hr x 24hrs- 370g Dextrose, 136g amino acids, 56g OMEGAVEN; provides 2362 kcals, 136g protein, GIR 2.87 mg/kg/min  Clear liquid diet    PO Intake: Good (%) [   ]  Fair (50-75%) [   ] Poor (<25%) [   ] - N/A    GI Issues: 700cc ostomy output x 24hrs, 1900cc fistula output x 24hrs    Pain: no pain/discomfort noted    Skin Integrity: jaundiced, 3+ pitting edema to BLE to flank, trace pitting edema to upper extremities; EC fistula site, R arm skin tear    I&Os:   05-15-24 @ 07:01  -  05-16-24 @ 07:00  --------------------------------------------------------  IN: 3214.7 mL / OUT: 3930 mL / NET: -715.3 mL    05-16-24 @ 07:01  -  05-16-24 @ 12:36  --------------------------------------------------------  IN: 1155 mL / OUT: 1350 mL / NET: -195 mL        Labs:   05-16    140  |  99  |  72<H>  ----------------------------<  127<H>  3.6   |  32<H>  |  1.60<H>    Ca    8.1<L>      16 May 2024 05:06  Phos  3.8     05-16  Mg     2.1     05-16    TPro  5.6<L>  /  Alb  2.7<L>  /  TBili  8.4<H>  /  DBili  6.2<H>  /  AST  124<H>  /  ALT  39  /  AlkPhos  96  05-16    CAPILLARY BLOOD GLUCOSE      POCT Blood Glucose.: 133 mg/dL (16 May 2024 11:57)  POCT Blood Glucose.: 113 mg/dL (16 May 2024 07:03)  POCT Blood Glucose.: 129 mg/dL (15 May 2024 23:57)  POCT Blood Glucose.: 152 mg/dL (15 May 2024 17:58)      Medications:  MEDICATIONS  (STANDING):  acetaZOLAMIDE  IVPB 250 milliGRAM(s) IV Intermittent once  albuterol/ipratropium for Nebulization 3 milliLiter(s) Nebulizer every 6 hours  atorvastatin 20 milliGRAM(s) Oral at bedtime  bacitracin   Ointment 1 Application(s) Topical two times a day  ceFAZolin   IVPB 2000 milliGRAM(s) IV Intermittent every 8 hours  ceFAZolin   IVPB      chlorhexidine 2% Cloths 1 Application(s) Topical daily  cholestyramine Powder (Sugar-Free) 4 Gram(s) Oral every 24 hours  dextrose 10% Bolus 125 milliLiter(s) IV Bolus once  dextrose 5%. 1000 milliLiter(s) (100 mL/Hr) IV Continuous <Continuous>  dextrose 5%. 1000 milliLiter(s) (50 mL/Hr) IV Continuous <Continuous>  dextrose 50% Injectable 25 Gram(s) IV Push once  dextrose 50% Injectable 12.5 Gram(s) IV Push once  glucagon  Injectable 1 milliGRAM(s) IntraMuscular once  hydrocortisone Enema 100 milliGRAM(s) Rectal every 12 hours  insulin lispro (ADMELOG) corrective regimen sliding scale   SubCutaneous every 6 hours  levothyroxine Injectable 40 MICROGram(s) IV Push <User Schedule>  melatonin 5 milliGRAM(s) Oral at bedtime  metoprolol tartrate Injectable 5 milliGRAM(s) IV Push every 6 hours  nystatin Powder 1 Application(s) Topical <User Schedule>  Omegaven 10grams/100mL 56 Gram(s),IV Solution 560 milliLiter(s) 56 Gram(s) (46.67 mL/Hr) IV Continuous <Continuous>  pancrelipase  (CREON 24,000 Lipase Units) 1 Capsule(s) Oral three times a day with meals  Parenteral Nutrition - Adult 1 Each (83 mL/Hr) TPN Continuous <Continuous>  Parenteral Nutrition - Adult 1 Each (83 mL/Hr) TPN Continuous <Continuous>  potassium chloride  20 mEq/100 mL IVPB 20 milliEquivalent(s) IV Intermittent every 2 hours  ursodiol Capsule 300 milliGRAM(s) Oral every 8 hours  venlafaxine XR. 300 milliGRAM(s) Oral daily    MEDICATIONS  (PRN):  dextrose Oral Gel 15 Gram(s) Oral once PRN Blood Glucose LESS THAN 70 milliGRAM(s)/deciliter  LORazepam   Injectable 0.25 milliGRAM(s) IV Push daily PRN Anxiety  ondansetron Injectable 4 milliGRAM(s) IV Push every 6 hours PRN Nausea and/or Vomiting      Weight:  5/16 89.4kg  5/13 98.7kg  5/10 110.1kg  5/9 98kg  4/26 95.8kg   3/25 95.3kg  2/1 96kg  1/7 94kg  12/11/23 93.3kg  11/27/23 94.4kg  10/20/23 85.2kg  9/29/23 88.3kg  8/27/23 91.8kg    Weight Change:  wt fluctuations anticipated in setting of fluid shifts/edema. Continue daily/weekly weights for trending    Estimated energy needs:   Ideal body weight (83.4kg) used for calculations as pt >100% IBW and BMI <30 per [facility] Standards of Care. Needs estimated for age and adjusted for current clinical status, renal function, increased needs for wound healing, high outputs. Fluid needs per team.    Calories: 8482-8215 kcals based on 25-35 kcals/kg  Protein: 100-166.8g based on 1.2-2.0g protein/kg  Fluid needs per team*    Subjective:   78 year old male with past medical history of AFib/Flutter 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 Prolonged hospital course significant for recurrent AKIs, cholestatic transaminitis s/p perc cony, prior DVTs, PNAs, fungemia, sinus pauses s/p pacemaker and recurrent bleeding from fistula most recently s/p IR embolization of L branch of inferior epigastric artery via R groin access (4/16), with multiple readmissions for continued bleeding from fistula and ostomy sites. S/p ileoscopy on noting an ulcer w/ clot proximal to stoma w/ hemoclip was applied (5/1). Admitted for symptomatic anemia secondary to bleeding from fistula site and ileostomy sites. Transferred to SICU 5/8 overnight for 2L blood loss from fistula site with subsequent hypotension. Now s/p ileoscopy, noting erythematous patch of bowel just deep to the ECF, without any active bleeding (5/9). S/p IR angiography noting  several areas of hyperemia in the jejunum adjacent to the stoma however no evidence of active bleeding (5/9).       Chart reviewed. Pt seen at bedside today on 5EA with IDT during AM rounds. TPN remains primary means to nutrition, also on clear liquid diet. Pt with bowel length <120cm without colon in continuity & high output fistula [>500ml daily]. Insufficient bowel to maintain/restore nutrition status through PO diet alone- unclear amount absorbed from PO diet. TPN changed to 24hr provision, GIR now GIR 2.87 mg/kg/min. Plan to increase amino acid provision to better meet needs as renal function improves. Lipids changed to Omegaven [fish oil] yesterday due to elevated bilirubin/cholestasis--- goal to provide 1g/kg/day lipid infusion [currently providing 0.6g/kg/day]. Noted downtrend in total & direct bilirubin today. Continue MWF copper & zinc in TPN, check serum levels monthly. To recheck vitamin D and consider supplementation. On cholestyramine & ursodiol. RDN will continue to monitor, reassess, and intervene as appropriate.       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 c/w TPN via PICC as primary means to nutrition- recommend 370g Dextrose, 120g AA, 56g Omegaven; provides provides 2362 kcals, 136g protein, GIR 2.87 mg/kg/min   - provides 28.3 kcals/kg, 21.8 non-protein kcals/kg, 1.6g protein/kg IBW  - monitor renal function, increase amino acid provision to meet needs/account for losses as renal function improves --> goal: 2g/kg IBW  - fluid & lytes per team  - continue to monitor renal & hepatic labs, adjust substrates as indicated  - copper & zinc MWF in TPN bag, recheck serum levels monthly  2. PO diet per MD discretion  - c/w clear liquid diet & monitor tolerance closely  - previously on low fiber diet, resume as medically feasible  - recommend 1pk Ottoniel BID; provides 160 kcals, 14g arginine, 14g glutamine, 5g collagen Fluid & lytes per team  - monitor outputs & adjust for losses prn  4. Weekly lipid panel  - hold / decrease lipid infusion if TG >400  - trend hepatic labs, adjust substrates in bag prn  - increase Omegaven provision as medically feasible - goal: 1g lipid/kg/day  5. Recheck serum Vit D, recommend supplementation as able  6. Daily BMP/Mg/Phos, fingersticks Q4-6hrs  - monitor lytes closely & replete outside bag prn  7. Monitor I&Os  8. Consider UUN & fecal fat studies to monitor needs and/or changes in PO absorption  9. Close monitoring of weights, outputs, labs, adjust TPN as indicated to prevent over/underfeeding    Education: ongoing diet education    Risk Level: High [ X  ] Moderate [   ] Low [   ]

## 2024-05-16 NOTE — PROGRESS NOTE ADULT - SUBJECTIVE AND OBJECTIVE BOX
Infectious Disease Progress Note:  LARRY KELLY is a 78yMale patient    No acute events overnight     GI BLEED ANEMIA          ROS:  CONSTITUTIONAL:  Negative fever or chills, feels well, good appetite  EYES:  Negative  blurry vision or double vision  CARDIOVASCULAR:  Negative for chest pain or palpitations  RESPIRATORY:  Negative for cough, wheezing, or SOB   GASTROINTESTINAL:  Negative for nausea, vomiting, diarrhea, constipation, or abdominal pain  GENITOURINARY:  Negative frequency, urgency or dysuria  NEUROLOGIC:  No headache, confusion, dizziness, lightheadedness    Allergies    penicillin (Angioedema)    Intolerances        ANTIBIOTICS/RELEVANT:  antimicrobials  ceFAZolin   IVPB 2000 milliGRAM(s) IV Intermittent every 8 hours  ceFAZolin   IVPB        immunologic:    OTHER:  acetaZOLAMIDE  IVPB 250 milliGRAM(s) IV Intermittent once  albuterol/ipratropium for Nebulization 3 milliLiter(s) Nebulizer every 6 hours  atorvastatin 20 milliGRAM(s) Oral at bedtime  bacitracin   Ointment 1 Application(s) Topical two times a day  chlorhexidine 2% Cloths 1 Application(s) Topical daily  cholestyramine Powder (Sugar-Free) 4 Gram(s) Oral every 24 hours  dextrose 10% Bolus 125 milliLiter(s) IV Bolus once  dextrose 5%. 1000 milliLiter(s) IV Continuous <Continuous>  dextrose 5%. 1000 milliLiter(s) IV Continuous <Continuous>  dextrose 50% Injectable 25 Gram(s) IV Push once  dextrose 50% Injectable 12.5 Gram(s) IV Push once  dextrose Oral Gel 15 Gram(s) Oral once PRN  glucagon  Injectable 1 milliGRAM(s) IntraMuscular once  hydrocortisone Enema 100 milliGRAM(s) Rectal every 12 hours  insulin lispro (ADMELOG) corrective regimen sliding scale   SubCutaneous every 6 hours  levothyroxine Injectable 40 MICROGram(s) IV Push <User Schedule>  LORazepam   Injectable 0.25 milliGRAM(s) IV Push daily PRN  melatonin 5 milliGRAM(s) Oral at bedtime  metoprolol tartrate Injectable 5 milliGRAM(s) IV Push every 6 hours  nystatin Powder 1 Application(s) Topical <User Schedule>  Omegaven 10grams/100mL 56 Gram(s),IV Solution 560 milliLiter(s) 56 Gram(s) IV Continuous <Continuous>  ondansetron Injectable 4 milliGRAM(s) IV Push every 6 hours PRN  pancrelipase  (CREON 24,000 Lipase Units) 1 Capsule(s) Oral three times a day with meals  Parenteral Nutrition - Adult 1 Each TPN Continuous <Continuous>  Parenteral Nutrition - Adult 1 Each TPN Continuous <Continuous>  potassium chloride  20 mEq/100 mL IVPB 20 milliEquivalent(s) IV Intermittent every 2 hours  ursodiol Capsule 300 milliGRAM(s) Oral every 8 hours  venlafaxine XR. 300 milliGRAM(s) Oral daily      Objective:  Vital Signs Last 24 Hrs  T(C): 36.7 (16 May 2024 12:00), Max: 36.8 (15 May 2024 17:40)  T(F): 98.1 (16 May 2024 12:00), Max: 98.3 (15 May 2024 17:40)  HR: 103 (16 May 2024 12:00) (89 - 110)  BP: 125/58 (16 May 2024 12:00) (95/55 - 129/62)  BP(mean): 84 (16 May 2024 12:00) (67 - 88)  RR: 20 (16 May 2024 12:00) (17 - 37)  SpO2: 99% (16 May 2024 12:00) (93% - 100%)    Parameters below as of 16 May 2024 13:00  Patient On (Oxygen Delivery Method): room air        PHYSICAL EXAM:  Constitutional:Well-developed, well nourished  Eyes:PATRICIA, EOMI  Ear/Nose/Throat: no oral lesion, no sinus tenderness on percussion	  Neck:no JVD, no lymphadenopathy, supple  Respiratory: CTA b/l   Cardiovascular: S1S2 RRR, no murmurs  Gastrointestinal:soft, (+) BS, no HSM  Extremities: no e/e/c        LABS:                        7.8    12.29 )-----------( 51       ( 16 May 2024 05:06 )             24.8     05-16    140  |  99  |  72<H>  ----------------------------<  127<H>  3.6   |  32<H>  |  1.60<H>    Ca    8.1<L>      16 May 2024 05:06  Phos  3.8     05-16  Mg     2.1     05-16    TPro  5.6<L>  /  Alb  2.7<L>  /  TBili  8.4<H>  /  DBili  6.2<H>  /  AST  124<H>  /  ALT  39  /  AlkPhos  96  05-16    PT/INR - ( 16 May 2024 05:06 )   PT: 17.4 sec;   INR: 1.55          PTT - ( 16 May 2024 05:06 )  PTT:33.9 sec  Urinalysis Basic - ( 16 May 2024 05:06 )    Color: x / Appearance: x / SG: x / pH: x  Gluc: 127 mg/dL / Ketone: x  / Bili: x / Urobili: x   Blood: x / Protein: x / Nitrite: x   Leuk Esterase: x / RBC: x / WBC x   Sq Epi: x / Non Sq Epi: x / Bacteria: x          MICROBIOLOGY:        RADIOLOGY & ADDITIONAL STUDIES: Infectious Disease Progress Note:  LARRY KELLY is a 78yMale patient    No acute events overnight     GI BLEED ANEMIA          ROS:  CONSTITUTIONAL:  Negative fever or chills, feels well, good appetite  EYES:  Negative  blurry vision or double vision  CARDIOVASCULAR:  Negative for chest pain or palpitations  RESPIRATORY:  Negative for cough, wheezing, or SOB   GASTROINTESTINAL:  Negative for nausea, vomiting, diarrhea, constipation, or abdominal pain  GENITOURINARY:  Negative frequency, urgency or dysuria  NEUROLOGIC:  No headache, confusion, dizziness, lightheadedness    Allergies    penicillin (Angioedema)    Intolerances        ANTIBIOTICS/RELEVANT:  antimicrobials  ceFAZolin   IVPB 2000 milliGRAM(s) IV Intermittent every 8 hours  ceFAZolin   IVPB        immunologic:    OTHER:  acetaZOLAMIDE  IVPB 250 milliGRAM(s) IV Intermittent once  albuterol/ipratropium for Nebulization 3 milliLiter(s) Nebulizer every 6 hours  atorvastatin 20 milliGRAM(s) Oral at bedtime  bacitracin   Ointment 1 Application(s) Topical two times a day  chlorhexidine 2% Cloths 1 Application(s) Topical daily  cholestyramine Powder (Sugar-Free) 4 Gram(s) Oral every 24 hours  dextrose 10% Bolus 125 milliLiter(s) IV Bolus once  dextrose 5%. 1000 milliLiter(s) IV Continuous <Continuous>  dextrose 5%. 1000 milliLiter(s) IV Continuous <Continuous>  dextrose 50% Injectable 25 Gram(s) IV Push once  dextrose 50% Injectable 12.5 Gram(s) IV Push once  dextrose Oral Gel 15 Gram(s) Oral once PRN  glucagon  Injectable 1 milliGRAM(s) IntraMuscular once  hydrocortisone Enema 100 milliGRAM(s) Rectal every 12 hours  insulin lispro (ADMELOG) corrective regimen sliding scale   SubCutaneous every 6 hours  levothyroxine Injectable 40 MICROGram(s) IV Push <User Schedule>  LORazepam   Injectable 0.25 milliGRAM(s) IV Push daily PRN  melatonin 5 milliGRAM(s) Oral at bedtime  metoprolol tartrate Injectable 5 milliGRAM(s) IV Push every 6 hours  nystatin Powder 1 Application(s) Topical <User Schedule>  Omegaven 10grams/100mL 56 Gram(s),IV Solution 560 milliLiter(s) 56 Gram(s) IV Continuous <Continuous>  ondansetron Injectable 4 milliGRAM(s) IV Push every 6 hours PRN  pancrelipase  (CREON 24,000 Lipase Units) 1 Capsule(s) Oral three times a day with meals  Parenteral Nutrition - Adult 1 Each TPN Continuous <Continuous>  Parenteral Nutrition - Adult 1 Each TPN Continuous <Continuous>  potassium chloride  20 mEq/100 mL IVPB 20 milliEquivalent(s) IV Intermittent every 2 hours  ursodiol Capsule 300 milliGRAM(s) Oral every 8 hours  venlafaxine XR. 300 milliGRAM(s) Oral daily      Objective:  Vital Signs Last 24 Hrs  T(C): 36.7 (16 May 2024 12:00), Max: 36.8 (15 May 2024 17:40)  T(F): 98.1 (16 May 2024 12:00), Max: 98.3 (15 May 2024 17:40)  HR: 103 (16 May 2024 12:00) (89 - 110)  BP: 125/58 (16 May 2024 12:00) (95/55 - 129/62)  BP(mean): 84 (16 May 2024 12:00) (67 - 88)  RR: 20 (16 May 2024 12:00) (17 - 37)  SpO2: 99% (16 May 2024 12:00) (93% - 100%)    Parameters below as of 16 May 2024 13:00  Patient On (Oxygen Delivery Method): room air        PHYSICAL EXAM:  Constitutional:Well-developed, well nourished  Eyes:PATRICIA, EOMI  Ear/Nose/Throat: no oral lesion, no sinus tenderness on percussion	  Neck:no JVD, no lymphadenopathy, supple  Respiratory: No respiratory distress  Gastrointestinal:soft, (+) BS, no HSM  Extremities: no e/e/c        LABS:                        7.8    12.29 )-----------( 51       ( 16 May 2024 05:06 )             24.8     05-16    140  |  99  |  72<H>  ----------------------------<  127<H>  3.6   |  32<H>  |  1.60<H>    Ca    8.1<L>      16 May 2024 05:06  Phos  3.8     05-16  Mg     2.1     05-16    TPro  5.6<L>  /  Alb  2.7<L>  /  TBili  8.4<H>  /  DBili  6.2<H>  /  AST  124<H>  /  ALT  39  /  AlkPhos  96  05-16    PT/INR - ( 16 May 2024 05:06 )   PT: 17.4 sec;   INR: 1.55          PTT - ( 16 May 2024 05:06 )  PTT:33.9 sec  Urinalysis Basic - ( 16 May 2024 05:06 )    Color: x / Appearance: x / SG: x / pH: x  Gluc: 127 mg/dL / Ketone: x  / Bili: x / Urobili: x   Blood: x / Protein: x / Nitrite: x   Leuk Esterase: x / RBC: x / WBC x   Sq Epi: x / Non Sq Epi: x / Bacteria: x          MICROBIOLOGY:        RADIOLOGY & ADDITIONAL STUDIES: Infectious Disease Progress Note:  LARRY KELLY is a 78yMale patient    No acute events overnight     GI BLEED ANEMIA          ROS:  CONSTITUTIONAL:  Negative fever or chills, feels well, good appetite  EYES:  Negative  blurry vision or double vision  CARDIOVASCULAR:  Negative for chest pain or palpitations  RESPIRATORY:  Negative for cough, wheezing, or SOB   GASTROINTESTINAL:  Negative for nausea, vomiting, diarrhea, constipation, or abdominal pain  GENITOURINARY:  Negative frequency, urgency or dysuria  NEUROLOGIC:  No headache, confusion, dizziness, lightheadedness    Allergies    penicillin (Angioedema)    Intolerances        ANTIBIOTICS/RELEVANT:  antimicrobials  ceFAZolin   IVPB 2000 milliGRAM(s) IV Intermittent every 8 hours  ceFAZolin   IVPB        immunologic:    OTHER:  acetaZOLAMIDE  IVPB 250 milliGRAM(s) IV Intermittent once  albuterol/ipratropium for Nebulization 3 milliLiter(s) Nebulizer every 6 hours  atorvastatin 20 milliGRAM(s) Oral at bedtime  bacitracin   Ointment 1 Application(s) Topical two times a day  chlorhexidine 2% Cloths 1 Application(s) Topical daily  cholestyramine Powder (Sugar-Free) 4 Gram(s) Oral every 24 hours  dextrose 10% Bolus 125 milliLiter(s) IV Bolus once  dextrose 5%. 1000 milliLiter(s) IV Continuous <Continuous>  dextrose 5%. 1000 milliLiter(s) IV Continuous <Continuous>  dextrose 50% Injectable 25 Gram(s) IV Push once  dextrose 50% Injectable 12.5 Gram(s) IV Push once  dextrose Oral Gel 15 Gram(s) Oral once PRN  glucagon  Injectable 1 milliGRAM(s) IntraMuscular once  hydrocortisone Enema 100 milliGRAM(s) Rectal every 12 hours  insulin lispro (ADMELOG) corrective regimen sliding scale   SubCutaneous every 6 hours  levothyroxine Injectable 40 MICROGram(s) IV Push <User Schedule>  LORazepam   Injectable 0.25 milliGRAM(s) IV Push daily PRN  melatonin 5 milliGRAM(s) Oral at bedtime  metoprolol tartrate Injectable 5 milliGRAM(s) IV Push every 6 hours  nystatin Powder 1 Application(s) Topical <User Schedule>  Omegaven 10grams/100mL 56 Gram(s),IV Solution 560 milliLiter(s) 56 Gram(s) IV Continuous <Continuous>  ondansetron Injectable 4 milliGRAM(s) IV Push every 6 hours PRN  pancrelipase  (CREON 24,000 Lipase Units) 1 Capsule(s) Oral three times a day with meals  Parenteral Nutrition - Adult 1 Each TPN Continuous <Continuous>  Parenteral Nutrition - Adult 1 Each TPN Continuous <Continuous>  potassium chloride  20 mEq/100 mL IVPB 20 milliEquivalent(s) IV Intermittent every 2 hours  ursodiol Capsule 300 milliGRAM(s) Oral every 8 hours  venlafaxine XR. 300 milliGRAM(s) Oral daily      Objective:  Vital Signs Last 24 Hrs  T(C): 36.7 (16 May 2024 12:00), Max: 36.8 (15 May 2024 17:40)  T(F): 98.1 (16 May 2024 12:00), Max: 98.3 (15 May 2024 17:40)  HR: 103 (16 May 2024 12:00) (89 - 110)  BP: 125/58 (16 May 2024 12:00) (95/55 - 129/62)  BP(mean): 84 (16 May 2024 12:00) (67 - 88)  RR: 20 (16 May 2024 12:00) (17 - 37)  SpO2: 99% (16 May 2024 12:00) (93% - 100%)    Parameters below as of 16 May 2024 13:00  Patient On (Oxygen Delivery Method): room air        PHYSICAL EXAM:  Constitutional:Well-developed, well nourished  Eyes:PATRICIA, EOMI  Neck: R IJ CVC in place  Ear/Nose/Throat: no oral lesion, no sinus tenderness on percussion	  Neck:no JVD, no lymphadenopathy, supple  Respiratory: No respiratory distress  Gastrointestinal:soft, non-tender. Ostomy in place.  Extremities: no e/e/c        LABS:                        7.8    12.29 )-----------( 51       ( 16 May 2024 05:06 )             24.8     05-16    140  |  99  |  72<H>  ----------------------------<  127<H>  3.6   |  32<H>  |  1.60<H>    Ca    8.1<L>      16 May 2024 05:06  Phos  3.8     05-16  Mg     2.1     05-16    TPro  5.6<L>  /  Alb  2.7<L>  /  TBili  8.4<H>  /  DBili  6.2<H>  /  AST  124<H>  /  ALT  39  /  AlkPhos  96  05-16    PT/INR - ( 16 May 2024 05:06 )   PT: 17.4 sec;   INR: 1.55          PTT - ( 16 May 2024 05:06 )  PTT:33.9 sec  Urinalysis Basic - ( 16 May 2024 05:06 )    Color: x / Appearance: x / SG: x / pH: x  Gluc: 127 mg/dL / Ketone: x  / Bili: x / Urobili: x   Blood: x / Protein: x / Nitrite: x   Leuk Esterase: x / RBC: x / WBC x   Sq Epi: x / Non Sq Epi: x / Bacteria: x          MICROBIOLOGY:  Reviewed      RADIOLOGY & ADDITIONAL STUDIES:  Reviewed

## 2024-05-16 NOTE — OCCUPATIONAL THERAPY INITIAL EVALUATION ADULT - ADDITIONAL COMMENTS
Patient lives with his wife in an elevator apartment building with no PRATEEK. Patient recently d/c home with therapy however has had multiple admission for transfusions. Patient is R hand dominant and has a walk in shower with shower chair and bedside commode. Patient also owns a RW and has been using a RW to ambulate.

## 2024-05-16 NOTE — OCCUPATIONAL THERAPY INITIAL EVALUATION ADULT - DIAGNOSIS, OT EVAL
Patient brought to [facility] 2/2 recurrent anemia, presents with decreased overall strength, balance, postural control and activity tolerance impacting independence with functional activities and mobility.

## 2024-05-16 NOTE — PROGRESS NOTE ADULT - SUBJECTIVE AND OBJECTIVE BOX
Interval Events:  Patient seen and examined at bedside.      Allergies    penicillin (Angioedema)    Intolerances        Vital Signs Last 24 Hrs  T(C): 36.7 (16 May 2024 05:03), Max: 36.8 (15 May 2024 17:40)  T(F): 98.1 (16 May 2024 05:03), Max: 98.3 (15 May 2024 17:40)  HR: 103 (16 May 2024 06:15) (89 - 111)  BP: 95/55 (16 May 2024 05:00) (95/55 - 129/62)  BP(mean): 72 (16 May 2024 05:00) (67 - 94)  RR: 18 (16 May 2024 06:15) (17 - 37)  SpO2: 97% (16 May 2024 06:15) (93% - 100%)    Parameters below as of 16 May 2024 06:15  Patient On (Oxygen Delivery Method): room air        05-14 @ 07:01  -  05-15 @ 07:00  --------------------------------------------------------  IN: 4653.2 mL / OUT: 6560 mL / NET: -1906.8 mL    05-15 @ 07:01  -  05-16 @ 06:35  --------------------------------------------------------  IN: 2998.7 mL / OUT: 3580 mL / NET: -581.3 mL      05-14 @ 07:01  -  05-15 @ 07:00  --------------------------------------------------------  IN: 4653.2 mL / OUT: 6560 mL / NET: -1906.8 mL    05-15 @ 07:01  -  05-16 @ 06:35  --------------------------------------------------------  IN: 2998.7 mL / OUT: 3580 mL / NET: -581.3 mL        Physical Exam:     General: Resting comfortably in bed, NAD  Neuro: A&Ox3, no focal deficits  HEENT: MMM  Respiratory: non labored breathing, no respiratory distress. Lungs clear to auscultation with diminished sounds at bases  Cardiovascular: Irregularly irregular  Gastrointestinal: soft, NTND abdomen. Midline ECF with bilio-sanguinous output this am with no defined area of bleeding. Ileostomy with minimal blood tinged output.  Genitourinary: Poole with Light yellow urine.  Scrotum and Penile Edema.   Extremities: 3+ pitting edema in BLE to flank. WWP. + UExt: trace pitting edema   Skin: jaundiced.   Lines: R IJ TLC present.      LABS:      CBC Full  -  ( 16 May 2024 05:06 )  WBC Count : 12.29 K/uL  RBC Count : 2.75 M/uL  Hemoglobin : 7.8 g/dL  Hematocrit : 24.8 %  Platelet Count - Automated : 51 K/uL  Mean Cell Volume : 90.2 fl  Mean Cell Hemoglobin : 28.4 pg  Mean Cell Hemoglobin Concentration : 31.5 gm/dL  Auto Neutrophil # : x  Auto Lymphocyte # : x  Auto Monocyte # : x  Auto Eosinophil # : x  Auto Basophil # : x  Auto Neutrophil % : x  Auto Lymphocyte % : x  Auto Monocyte % : x  Auto Eosinophil % : x  Auto Basophil % : x    05-16    140  |  99  |  72<H>  ----------------------------<  127<H>  3.6   |  32<H>  |  1.60<H>    Ca    8.1<L>      16 May 2024 05:06  Phos  3.8     05-16  Mg     2.1     05-16    TPro  5.6<L>  /  Alb  2.7<L>  /  TBili  8.4<H>  /  DBili  6.2<H>  /  AST  124<H>  /  ALT  39  /  AlkPhos  96  05-16    PT/INR - ( 16 May 2024 05:06 )   PT: 17.4 sec;   INR: 1.55          PTT - ( 16 May 2024 05:06 )  PTT:33.9 sec      Urinalysis Basic - ( 16 May 2024 05:06 )    Color: x / Appearance: x / SG: x / pH: x  Gluc: 127 mg/dL / Ketone: x  / Bili: x / Urobili: x   Blood: x / Protein: x / Nitrite: x   Leuk Esterase: x / RBC: x / WBC x   Sq Epi: x / Non Sq Epi: x / Bacteria: x              RADIOLOGY & ADDITIONAL STUDIES (The following images were personally reviewed):          A/p: 78 year old male with past medical history of AFib/Flutter 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 Prolonged hospital course significant for recurrent AKIs, cholestatic transaminitis s/p perc cony, prior DVTs, PNAs, fungemia, sinus pauses s/p pacemaker and recurrent bleeding from fistula most recently s/p IR embolization of L branch of inferior epigastric artery via R groin access (4/16), with multiple readmissions for continued bleeding from fistula and ostomy sites. S/p ileoscopy on noting an ulcer w/ clot proximal to stoma w/ hemoclip was applied (5/1). Admitted for symptomatic anemia secondary to bleeding from fistula site and ileostomy sites. Transferred to SICU 5/8 overnight for 2L blood loss from fistula site with subsequent hypotension. Now s/p ileoscopy, noting erythematous patch of bowel just deep to the ECF, without any active bleeding (5/9). S/p IR angiography noting  several areas of hyperemia in the jejunum adjacent to the stoma however no evidence of active bleeding (5/9).     Neuro: Dilaudid prn for pain. Hx: Anxiety: Continue Effexor. Ambien on hold   CV: Hemorrhagic shock on 5/8, 2/2 EC Fistula bleed, now resolved. 2L of bright red blood. S/p total of 3 units of PRBCs (overnight 5/8), responded appropriately 8.3 (5). Keep MAPs > 65. Hx: AF: Restart Metoprolol today for continued tachycardia given BP improved. HLD: Continue Atorvastatin. Total body overload: Cont Lasix for goal I/O negative 1-2L/day, Will give diamox x1 as well this am + MSSA bacteremia: F/u Echo for vegetation negative on 5/13.   Pulm: Pleural Effusions b/l Rt improved on 5/12 xray. Left continued to be present. Will Cont to Diurese with Lasix. Satting low 90's on RA, nightly BIPAP at 40%.   GI: CLD. GI Bleed vs bleeding at fistula site: IR 5/9: segmental hyperemia, unable to embolize c/f bowel ischemia. Ileoscopy 5/9:erythematous patch of bowel just deep to the ECF, without active bleeding. CT 5/2 with bowel wall thickening.  Increased Bili: as per Surgical team NTD, no hepatology c/s at this time, US negative for pathology, Cont cholestyramine. restart Ursodiol.  Cont Creon Crohns: Holding methylprednisolone IV, started hydrocortisone enemas 5/10 as per GI. Continue TPN/ change Lipids to omegaven. Will hold off on remicade in setting of MSSA bacteremia. portal vein thrombus on duplex from 5/13 - unable to give A/C 2* bleed   : Poole will dc today. ELVI likely from acute blood loss now resolving. Baseline 1.2 - 1.4.   ID: Bacteremia 2/2 MSSA grown in BCx (5/9) repeat Bld cx's on 5/11 NGTD.  PICC line removed. Echo negative for vegetation. WBC improved afebrile.  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 Pt with hx of + blood cx's with candida glabrata from 11/24/23  Start Ancef (5/10- ) d/c: // Cefepime (5/10), Vanc x1 (5/9)  Endo: mISSc. Hx: Hypothyroidism. Continue synthroid.   PPx: SCDs, SQH on hold for bleeding.   Heme: Anemia 2/2 EC fistula bleed. Received a total of 11 units of PRBCs, 2 FFP,3 plt, 1 cryo this admission. Trend CBC. 5 of Vit k removed from TPN on 5/14. Plts Low this am if signs of bleeeding will transfuse  Wounds: Wound manager present with dressing changes done by Wound care team. Bacitracin to rt arm skin tear  PT: Ordered OOBTC daily.   Dispo: SICU poss transfer to [facility] intestinal rehab center in future.  Interval Events:  Patient seen and examined at bedside.      Allergies    penicillin (Angioedema)    Intolerances        Vital Signs Last 24 Hrs  T(C): 36.7 (16 May 2024 05:03), Max: 36.8 (15 May 2024 17:40)  T(F): 98.1 (16 May 2024 05:03), Max: 98.3 (15 May 2024 17:40)  HR: 103 (16 May 2024 06:15) (89 - 111)  BP: 95/55 (16 May 2024 05:00) (95/55 - 129/62)  BP(mean): 72 (16 May 2024 05:00) (67 - 94)  RR: 18 (16 May 2024 06:15) (17 - 37)  SpO2: 97% (16 May 2024 06:15) (93% - 100%)    Parameters below as of 16 May 2024 06:15  Patient On (Oxygen Delivery Method): room air        05-14 @ 07:01  -  05-15 @ 07:00  --------------------------------------------------------  IN: 4653.2 mL / OUT: 6560 mL / NET: -1906.8 mL    05-15 @ 07:01  -  05-16 @ 06:35  --------------------------------------------------------  IN: 2998.7 mL / OUT: 3580 mL / NET: -581.3 mL      05-14 @ 07:01  -  05-15 @ 07:00  --------------------------------------------------------  IN: 4653.2 mL / OUT: 6560 mL / NET: -1906.8 mL    05-15 @ 07:01  -  05-16 @ 06:35  --------------------------------------------------------  IN: 2998.7 mL / OUT: 3580 mL / NET: -581.3 mL        Physical Exam:     General: Resting comfortably in bed, NAD  Neuro: A&Ox3, no focal deficits  HEENT: MMM  Respiratory: non labored breathing, no respiratory distress. Lungs clear to auscultation with diminished sounds at bases  Cardiovascular: Irregularly irregular  Gastrointestinal: soft, NTND abdomen. Midline ECF with bilio-sanguinous output this am with no defined area of bleeding. Ileostomy with minimal blood tinged output.  Genitourinary: Po[place] with Light yellow urine.  Scrotum and Penile Edema.   Extremities: 3+ pitting edema in BLE to flank. WWP. + UExt: trace pitting edema   Skin: jaundiced.   Lines: R IJ TLC present.      LABS:      CBC Full  -  ( 16 May 2024 05:06 )  WBC Count : 12.29 K/uL  RBC Count : 2.75 M/uL  Hemoglobin : 7.8 g/dL  Hematocrit : 24.8 %  Platelet Count - Automated : 51 K/uL  Mean Cell Volume : 90.2 fl  Mean Cell Hemoglobin : 28.4 pg  Mean Cell Hemoglobin Concentration : 31.5 gm/dL  Auto Neutrophil # : x  Auto Lymphocyte # : x  Auto Monocyte # : x  Auto Eosinophil # : x  Auto Basophil # : x  Auto Neutrophil % : x  Auto Lymphocyte % : x  Auto Monocyte % : x  Auto Eosinophil % : x  Auto Basophil % : x    05-16    140  |  99  |  72<H>  ----------------------------<  127<H>  3.6   |  32<H>  |  1.60<H>    Ca    8.1<L>      16 May 2024 05:06  Phos  3.8     05-16  Mg     2.1     05-16    TPro  5.6<L>  /  Alb  2.7<L>  /  TBili  8.4<H>  /  DBili  6.2<H>  /  AST  124<H>  /  ALT  39  /  AlkPhos  96  05-16    PT/INR - ( 16 May 2024 05:06 )   PT: 17.4 sec;   INR: 1.55          PTT - ( 16 May 2024 05:06 )  PTT:33.9 sec      Urinalysis Basic - ( 16 May 2024 05:06 )    Color: x / Appearance: x / SG: x / pH: x  Gluc: 127 mg/dL / Ketone: x  / Bili: x / Urobili: x   Blood: x / Protein: x / Nitrite: x   Leuk Esterase: x / RBC: x / WBC x   Sq Epi: x / Non Sq Epi: x / Bacteria: x              RADIOLOGY & ADDITIONAL STUDIES (The following images were personally reviewed):          A/p: 78 year old male with past medical history of AFib/Flutter 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 Prolonged hospital course significant for recurrent AKIs, cholestatic transaminitis s/p perc cony, prior DVTs, PNAs, fungemia, sinus pauses s/p pacemaker and recurrent bleeding from fistula most recently s/p IR embolization of L branch of inferior epigastric artery via R groin access (4/16), with multiple readmissions for continued bleeding from fistula and ostomy sites. S/p ileoscopy on noting an ulcer w/ clot proximal to stoma w/ hemoclip was applied (5/1). Admitted for symptomatic anemia secondary to bleeding from fistula site and ileostomy sites. Transferred to SICU 5/8 overnight for 2L blood loss from fistula site with subsequent hypotension. Now s/p ileoscopy, noting erythematous patch of bowel just deep to the ECF, without any active bleeding (5/9). S/p IR angiography noting  several areas of hyperemia in the jejunum adjacent to the stoma however no evidence of active bleeding (5/9).     Neuro: Dilaudid prn for pain. Hx: Anxiety: Continue Effexor. Ambien on hold   CV: Hemorrhagic shock on 5/8, 2/2 EC Fistula bleed, now resolved. Hx of AF: Cont Metoprolol 5q6. HLD: Continue Atorvastatin. Total body overload: Cont Lasix for goal I/O negative 1-2L/day, given diamox x1 as well this am + MSSA bacteremia: F/u Echo for vegetation negative on 5/13. Hgb 7.8- will repeat CBC.   Pulm: Pleural Effusions b/l Rt improved on 5/12 xray. Left continued to be present. Will Cont to Diurese with Lasix. Satting low 90's on RA, nightly BIPAP at 40%.   GI: CLD. GI Bleed vs bleeding at fistula site: IR 5/9: segmental hyperemia, unable to embolize c/f bowel ischemia. Ileoscopy 5/9:erythematous patch of bowel just deep to the ECF, without active bleeding. CT 5/2 with bowel wall thickening.  Increased Bili: as per Surgical team NTD, no hepatology c/s at this time, US negative for pathology, Cont cholestyramine cont Ursodiol.  Cont Creon Crohns: Holding methylprednisolone IV, started hydrocortisone enemas 5/10 as per GI. Continue TPN/ change Lipids to omegaven. Will hold off on remicade in setting of MSSA bacteremia. portal vein thrombus on duplex from 5/13 - unable to give A/C 2* bleed   : Voids ELVI likely from acute blood loss now resolving. Baseline 1.2 - 1.4.   ID: Bacteremia 2/2 MSSA grown in BCx (5/9) repeat Bld cx's on 5/11 NGTD.  PICC line removed. Echo negative for vegetation. WBC improved afebrile.  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 Pt with hx of + blood cx's with candida glabrata from 11/24/23  Start Ancef (5/10- ) d/c: // Cefepime (5/10), Vanc x1 (5/9)  Endo: mISS Hx: Hypothyroidism. Continue synthroid.   PPx: SCDs, SQH on hold for bleeding.   Heme: Anemia 2/2 EC fistula bleed. Received a total of 11 units of PRBCs, 2 FFP,3 plt, 1 cryo this admission. Trend CBC. 5 of Vit k removed from TPN on 5/14. Plts Low this am if signs of bleeding will transfuse  Wounds: Wound manager present with dressing changes done by Wound care team. Bacitracin to rt arm skin tear  PT: Ordered OOBTC daily.   Dispo: SICU poss transfer to [facility] intestinal rehab center in future.  Interval Events:  Missed void x2 bleeding around fistula resolved on its own overnight however started to have severe bleeding form two areas at 3 and 11o'clock position which was oversewed at bedside.   Patient seen and examined at bedside.      Allergies    penicillin (Angioedema)    Intolerances        Vital Signs Last 24 Hrs  T(C): 36.7 (16 May 2024 05:03), Max: 36.8 (15 May 2024 17:40)  T(F): 98.1 (16 May 2024 05:03), Max: 98.3 (15 May 2024 17:40)  HR: 103 (16 May 2024 06:15) (89 - 111)  BP: 95/55 (16 May 2024 05:00) (95/55 - 129/62)  BP(mean): 72 (16 May 2024 05:00) (67 - 94)  RR: 18 (16 May 2024 06:15) (17 - 37)  SpO2: 97% (16 May 2024 06:15) (93% - 100%)    Parameters below as of 16 May 2024 06:15  Patient On (Oxygen Delivery Method): room air        05-14 @ 07:01  -  05-15 @ 07:00  --------------------------------------------------------  IN: 4653.2 mL / OUT: 6560 mL / NET: -1906.8 mL    05-15 @ 07:01 - 05-16 @ 06:35  --------------------------------------------------------  IN: 2998.7 mL / OUT: 3580 mL / NET: -581.3 mL      05-14 @ 07:01  -  05-15 @ 07:00  --------------------------------------------------------  IN: 4653.2 mL / OUT: 6560 mL / NET: -1906.8 mL    05-15 @ 07:01  -  05-16 @ 06:35  --------------------------------------------------------  IN: 2998.7 mL / OUT: 3580 mL / NET: -581.3 mL        Physical Exam:     General: Resting comfortably in bed, NAD  Neuro: A&Ox3, no focal deficits  HEENT: MMM  Respiratory: non labored breathing, no respiratory distress. Lungs clear to auscultation with diminished sounds at bases  Cardiovascular: Irregularly irregular  Gastrointestinal: soft, NTND abdomen. Midline ECF with bilio-sanguinous output this am with no defined area of bleeding. Ileostomy with minimal blood tinged output.  Genitourinary: Po[place] with Light yellow urine.  Scrotum and Penile Edema.   Extremities: 3+ pitting edema in BLE to flank. WWP. + UExt: trace pitting edema   Skin: jaundiced.   Lines: R IJ TLC present.      LABS:      CBC Full  -  ( 16 May 2024 05:06 )  WBC Count : 12.29 K/uL  RBC Count : 2.75 M/uL  Hemoglobin : 7.8 g/dL  Hematocrit : 24.8 %  Platelet Count - Automated : 51 K/uL  Mean Cell Volume : 90.2 fl  Mean Cell Hemoglobin : 28.4 pg  Mean Cell Hemoglobin Concentration : 31.5 gm/dL  Auto Neutrophil # : x  Auto Lymphocyte # : x  Auto Monocyte # : x  Auto Eosinophil # : x  Auto Basophil # : x  Auto Neutrophil % : x  Auto Lymphocyte % : x  Auto Monocyte % : x  Auto Eosinophil % : x  Auto Basophil % : x    05-16    140  |  99  |  72<H>  ----------------------------<  127<H>  3.6   |  32<H>  |  1.60<H>    Ca    8.1<L>      16 May 2024 05:06  Phos  3.8     05-16  Mg     2.1     05-16    TPro  5.6<L>  /  Alb  2.7<L>  /  TBili  8.4<H>  /  DBili  6.2<H>  /  AST  124<H>  /  ALT  39  /  AlkPhos  96  05-16    PT/INR - ( 16 May 2024 05:06 )   PT: 17.4 sec;   INR: 1.55          PTT - ( 16 May 2024 05:06 )  PTT:33.9 sec      Urinalysis Basic - ( 16 May 2024 05:06 )    Color: x / Appearance: x / SG: x / pH: x  Gluc: 127 mg/dL / Ketone: x  / Bili: x / Urobili: x   Blood: x / Protein: x / Nitrite: x   Leuk Esterase: x / RBC: x / WBC x   Sq Epi: x / Non Sq Epi: x / Bacteria: x              RADIOLOGY & ADDITIONAL STUDIES (The following images were personally reviewed):          A/p: 78 year old male with past medical history of AFib/Flutter 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 Prolonged hospital course significant for recurrent AKIs, cholestatic transaminitis s/p perc cony, prior DVTs, PNAs, fungemia, sinus pauses s/p pacemaker and recurrent bleeding from fistula most recently s/p IR embolization of L branch of inferior epigastric artery via R groin access (4/16), with multiple readmissions for continued bleeding from fistula and ostomy sites. S/p ileoscopy on noting an ulcer w/ clot proximal to stoma w/ hemoclip was applied (5/1). Admitted for symptomatic anemia secondary to bleeding from fistula site and ileostomy sites. Transferred to SICU 5/8 overnight for 2L blood loss from fistula site with subsequent hypotension. Now s/p ileoscopy, noting erythematous patch of bowel just deep to the ECF, without any active bleeding (5/9). S/p IR angiography noting  several areas of hyperemia in the jejunum adjacent to the stoma however no evidence of active bleeding (5/9).     Neuro: Dilaudid prn for pain. Hx: Anxiety: Continue Effexor. Ambien on hold   CV: Hemorrhagic shock on 5/8, 2/2 EC Fistula bleed, now resolved. Hx of AF: Cont Metoprolol 5q6. HLD: Continue Atorvastatin. Total body overload: Cont Lasix for goal I/O negative 1-2L/day, given diamox x1 as well this am + MSSA bacteremia: F/u Echo for vegetation negative on 5/13. Hgb 7.8- will repeat CBC.   Pulm: Pleural Effusions b/l Rt improved on 5/12 xray. Left continued to be present. Will Cont to Diurese with Lasix. Satting low 90's on RA, nightly BIPAP at 40%.   GI: CLD. GI Bleed vs bleeding at fistula site: IR 5/9: segmental hyperemia, unable to embolize c/f bowel ischemia. Ileoscopy 5/9:erythematous patch of bowel just deep to the ECF, without active bleeding. CT 5/2 with bowel wall thickening.  Increased Bili: as per Surgical team NTD, no hepatology c/s at this time, US negative for pathology, Cont cholestyramine cont Ursodiol.  Cont Creon Crohns: Holding methylprednisolone IV, started hydrocortisone enemas 5/10 as per GI. Continue TPN/ change Lipids to omegaven. Will hold off on remicade in setting of MSSA bacteremia. portal vein thrombus on duplex from 5/13 - unable to give A/C 2* bleed   : Voids ELVI likely from acute blood loss now resolving. Baseline 1.2 - 1.4.   ID: Bacteremia 2/2 MSSA grown in BCx (5/9) repeat Bld cx's on 5/11 NGTD.  PICC line removed. Echo negative for vegetation. WBC improved afebrile.  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 Pt with hx of + blood cx's with candida glabrata from 11/24/23  Start Ancef (5/10- ) d/c: // Cefepime (5/10), Vanc x1 (5/9)  Endo: mISS Hx: Hypothyroidism. Continue synthroid.   PPx: SCDs, SQH on hold for bleeding.   Heme: Anemia 2/2 EC fistula bleed. Received a total of 11 units of PRBCs, 2 FFP,3 plt, 1 cryo this admission. Trend CBC. 5 of Vit k removed from TPN on 5/14. Plts Low this am if signs of bleeding will transfuse  Wounds: Wound manager present with dressing changes done by Wound care team. Bacitracin to rt arm skin tear  PT: Ordered OOBTC daily.   Dispo: SICU poss transfer to [facility] intestinal rehab center in future.  Interval Events:  Missed void x2 bleeding around fistula resolved on its own overnight however started to have severe bleeding form two areas at 3 and 11o'clock position which was oversewed at bedside.   Patient seen and examined at bedside.      Allergies    penicillin (Angioedema)    Intolerances        Vital Signs Last 24 Hrs  T(C): 36.7 (16 May 2024 05:03), Max: 36.8 (15 May 2024 17:40)  T(F): 98.1 (16 May 2024 05:03), Max: 98.3 (15 May 2024 17:40)  HR: 103 (16 May 2024 06:15) (89 - 111)  BP: 95/55 (16 May 2024 05:00) (95/55 - 129/62)  BP(mean): 72 (16 May 2024 05:00) (67 - 94)  RR: 18 (16 May 2024 06:15) (17 - 37)  SpO2: 97% (16 May 2024 06:15) (93% - 100%)    Parameters below as of 16 May 2024 06:15  Patient On (Oxygen Delivery Method): room air        05-14 @ 07:01  -  05-15 @ 07:00  --------------------------------------------------------  IN: 4653.2 mL / OUT: 6560 mL / NET: -1906.8 mL    05-15 @ 07:01 - 05-16 @ 06:35  --------------------------------------------------------  IN: 2998.7 mL / OUT: 3580 mL / NET: -581.3 mL      05-14 @ 07:01  -  05-15 @ 07:00  --------------------------------------------------------  IN: 4653.2 mL / OUT: 6560 mL / NET: -1906.8 mL    05-15 @ 07:01  -  05-16 @ 06:35  --------------------------------------------------------  IN: 2998.7 mL / OUT: 3580 mL / NET: -581.3 mL        Physical Exam:     General: Resting comfortably in bed, NAD  Neuro: A&Ox3, no focal deficits  HEENT: MMM  Respiratory: non labored breathing, no respiratory distress. Lungs clear to auscultation with diminished sounds at bases  Cardiovascular: Irregularly irregular  Gastrointestinal: soft, NTND abdomen. Midline ECF with bleeding and clots noted this am which stopped after suturing and quickclot application x2. Ileostomy with minimal blood tinged output.  Genitourinary: Poole with Light yellow urine.  Scrotum and Penile Edema.   Extremities: 3+ pitting edema in BLE to flank. WWP. + UExt: trace pitting edema   Skin: jaundiced.   Lines: R IJ TLC present.      LABS:      CBC Full  -  ( 16 May 2024 05:06 )  WBC Count : 12.29 K/uL  RBC Count : 2.75 M/uL  Hemoglobin : 7.8 g/dL  Hematocrit : 24.8 %  Platelet Count - Automated : 51 K/uL  Mean Cell Volume : 90.2 fl  Mean Cell Hemoglobin : 28.4 pg  Mean Cell Hemoglobin Concentration : 31.5 gm/dL  Auto Neutrophil # : x  Auto Lymphocyte # : x  Auto Monocyte # : x  Auto Eosinophil # : x  Auto Basophil # : x  Auto Neutrophil % : x  Auto Lymphocyte % : x  Auto Monocyte % : x  Auto Eosinophil % : x  Auto Basophil % : x    05-16    140  |  99  |  72<H>  ----------------------------<  127<H>  3.6   |  32<H>  |  1.60<H>    Ca    8.1<L>      16 May 2024 05:06  Phos  3.8     05-16  Mg     2.1     05-16    TPro  5.6<L>  /  Alb  2.7<L>  /  TBili  8.4<H>  /  DBili  6.2<H>  /  AST  124<H>  /  ALT  39  /  AlkPhos  96  05-16    PT/INR - ( 16 May 2024 05:06 )   PT: 17.4 sec;   INR: 1.55          PTT - ( 16 May 2024 05:06 )  PTT:33.9 sec      Urinalysis Basic - ( 16 May 2024 05:06 )    Color: x / Appearance: x / SG: x / pH: x  Gluc: 127 mg/dL / Ketone: x  / Bili: x / Urobili: x   Blood: x / Protein: x / Nitrite: x   Leuk Esterase: x / RBC: x / WBC x   Sq Epi: x / Non Sq Epi: x / Bacteria: x              RADIOLOGY & ADDITIONAL STUDIES (The following images were personally reviewed):          A/p: 78 year old male with past medical history of AFib/Flutter 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 Prolonged hospital course significant for recurrent AKIs, cholestatic transaminitis s/p perc cony, prior DVTs, PNAs, fungemia, sinus pauses s/p pacemaker and recurrent bleeding from fistula most recently s/p IR embolization of L branch of inferior epigastric artery via R groin access (4/16), with multiple readmissions for continued bleeding from fistula and ostomy sites. S/p ileoscopy on noting an ulcer w/ clot proximal to stoma w/ hemoclip was applied (5/1). Admitted for symptomatic anemia secondary to bleeding from fistula site and ileostomy sites. Transferred to SICU 5/8 overnight for 2L blood loss from fistula site with subsequent hypotension. Now s/p ileoscopy, noting erythematous patch of bowel just deep to the ECF, without any active bleeding (5/9). S/p IR angiography noting  several areas of hyperemia in the jejunum adjacent to the stoma however no evidence of active bleeding (5/9).     Neuro: Dilaudid prn for pain. Hx: Anxiety: Continue Effexor. Ambien on hold   CV: Hemorrhagic shock on 5/8, 2/2 EC Fistula bleed, now resolved. Hx of AF: Cont Metoprolol 5q6. HLD: Continue Atorvastatin. Total body overload: Cont Lasix for goal I/O negative 1-2L/day, given diamox x1 as well this am + MSSA bacteremia: F/u Echo negative for vegetation on 5/13. Hgb 7.8 given 1 UPRBC 2* active bleed.   Pulm: Pleural Effusions b/l Rt improved on 5/12 xray. Left effusion continued to be present. Will Cont to Diurese with Lasix. Satting low 90's on RA, nightly BIPAP at 40%.   GI: CLD. GI Bleed vs bleeding at fistula site: IR 5/9: segmental hyperemia, unable to embolize c/f bowel ischemia. Ileoscopy 5/9:erythematous patch of bowel just deep to the ECF, without active bleeding. CT 5/2 with bowel wall thickening.  Increased Bili: Now improving US US showing portal vein thrombus however unable to give AC given active bleed. Cont cholestyramine cont Ursodiol.  Cont Creon. Crohns: Holding methylprednisolone IV, started hydrocortisone enemas 5/10 as per GI. Continue TPN/ Lipids changed to omegaven Copper and Selenium levels (P) may need Lipids qweek - will discuss with nutrition.  Will hold off on remicade in setting of MSSA bacteremia.   : Voids Cont Primafit ELVI likely from acute blood loss now resolving. Baseline 1.2 - 1.4.   ID: Bacteremia 2/2 MSSA grown in BCx (5/9) repeat Bld cx's on 5/11 NGTD.  PICC line removed. Echo negative for vegetation. WBC improved afebrile.  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 Pt with hx of + blood cx's with candida glabrata from 11/24/23  Start Ancef (5/10- ) d/c: // Cefepime (5/10), Vanc x1 (5/9)  Endo: mISS Hx: Hypothyroidism. Continue synthroid.   PPx: SCDs, SQH on hold for bleeding. Duplex negative for DVT on 5/14   Heme: Anemia 2/2 EC fistula bleed. Received a total of 11 units of PRBCs, 2 FFP,3 plt, 1 cryo this admission. Trend CBC. 5 of Vit k removed from TPN on 5/14. Plts Low this am if signs of bleeding will transfuse  Wounds: Wound manager present with dressing changes done by Wound care team. Bacitracin to rt arm skin tear  PT: Ordered OOBTC daily.   Dispo: SICU poss transfer to [facility] intestinal rehab center in future.

## 2024-05-16 NOTE — PROGRESS NOTE ADULT - ASSESSMENT
Patient is a 77yo M with complicated history,, PMH of AFib/Flutter s/p DCCV, Crohn's, prior ileocectomy and open appendectomy, extensive abdominal surgeries and ileostomy, short-bowel syndrome, hypothyroidism, depression, recently seen at the Emergency Department for blood transfusion and multiple recent admissions for transfusions who presents as a direct admission for recurrent anemia. FFP x 3 on May 3rd.     Factors IX and XI are low consistent with liver dysfunction. Low-normal Fibrinogen    elevated FVIII and VWF which are acute phase reactants  Reason for liver dysfunction is unexplained.  would trend fibrinogen with low threshold for cryo if fibrinogen decreasing  Bleeding is likely associated with liver dysregulation and decreased factor production.       + recurrent bleeding in ostomy   Improved  S/p plt transfusion and FFP  Monitor CBC closely  Ideally would keep Plt > 50,000  PRBC as clinically indicated or Hgb <7      Thank you      Shruthi Thompson MD  for Dr Reyes Villatoro   [phone]

## 2024-05-16 NOTE — PROGRESS NOTE ADULT - SUBJECTIVE AND OBJECTIVE BOX
events noted  bled from mucus fistulaand oversewn  getting transfused  ileostomy no blood  continue steroid enema  maybe topical steroid on the mucus fistula parastomal area? events noted  bled from mucus fistula and oversewn  getting transfused  ileostomy no blood  continue steroid enema  maybe topical steroid on the mucus fistula parastomal area?

## 2024-05-17 NOTE — PROGRESS NOTE ADULT - PROBLEM SELECTOR PROBLEM 1
Anemia due to acute blood loss

## 2024-05-17 NOTE — PROCEDURE NOTE - NSINDICATIONS_GEN_A_CORE
blood sampling/critical patient/monitoring purposes
emergency venous access
antibiotic therapy/Total Parenteral Nutrition/TPN
emergency venous access
emergency venous access
critical illness/emergency venous access

## 2024-05-17 NOTE — PROCEDURAL SAFETY CHECKLIST WITH OR WITHOUT SEDATION - NSPROCEDPERFORMDFREE_GEN_ALL_CORE
Right IJ Central Line Insertion
Left Radial A-line
Lt upper extremity Double lumen PICC
ileostomy exp fistula

## 2024-05-17 NOTE — PROCEDURE NOTE - NSPROCDETAILS_GEN_ALL_CORE
location identified, draped/prepped, sterile technique used/sterile dressing applied/sterile technique, catheter placed/supine position
blood seen on insertion/dressing applied/flushes easily/secured in place/sterile technique, catheter placed
guidewire recovered/lumen(s) aspirated and flushed/sterile dressing applied/sterile technique, catheter placed/ultrasound guidance with use of sterile gel and probe cove
location identified, draped/prepped, sterile technique used, needle inserted/introduced/connected to a pressurized flush line/sutured in place/hemostasis with direct pressure, dressing applied/Seldinger technique/all materials/supplies accounted for at end of procedure
blood seen on insertion/dressing applied/flushes easily/secured in place/sterile technique, catheter placed
blood seen on insertion/dressing applied/flushes easily/secured in place

## 2024-05-17 NOTE — PROGRESS NOTE ADULT - PROBLEM SELECTOR PROBLEM 2
History of short bowel syndrome

## 2024-05-17 NOTE — PROCEDURE NOTE - NSANESTHESIA_GEN_A_CORE
1% lidocaine
no anesthesia administered
1% lidocaine
2% lidocaine

## 2024-05-17 NOTE — PROCEDURE NOTE - NSPOSTCAREGUIDE_GEN_A_CORE
Verbal/written post procedure instructions were given to patient/caregiver/Instructed patient/caregiver regarding signs and symptoms of infection/Keep the cast/splint/dressing clean and dry
Care for catheter as per unit/ICU protocols

## 2024-05-17 NOTE — PROCEDURE NOTE - PROCEDURE DATE TIME, MLM
17-May-2024 12:01
09-May-2024 18:45
17-May-2024 17:00
09-May-2024 00:30
09-May-2024 21:18
17-May-2024 17:17
09-May-2024 16:28

## 2024-05-17 NOTE — PROCEDURAL SAFETY CHECKLIST WITH OR WITHOUT SEDATION - NSPX2BRECORDED_GEN_ALL_CORE
Left upper arm double lumen PICC
Left Radial A-line
Right IJ Central Line Insertion
ileoscopy exp fistula

## 2024-05-17 NOTE — PROCEDURE NOTE - PRACTITIONER PERFORMING THE TIME OUT
Leighton Mcdonald
Dannielle Campbell NP
Dannielle Campbell NP
isidoro RUIZ
Dannielle Campbell NP
Dannielle Campbell NP

## 2024-05-17 NOTE — PROGRESS NOTE ADULT - SUBJECTIVE AND OBJECTIVE BOX
Hem Onc    INTERVAL HISTORY   Doing better considerably this am. Sitting in the chair. No bleeding over night    s/p 1 unit PRBC on 5/12, ptl/FFP on 5/15    77 yo man with a history of AFib/Flutter s/p DCCV, Crohn's, prior ileocecostomy and open appendectomy, extensive abdominal surgeries and ileostomy presents with persistent anemia     MEDICATIONS  (STANDING):  albumin human 25% IVPB 50 milliLiter(s) IV Intermittent every 8 hours  albuterol/ipratropium for Nebulization 3 milliLiter(s) Nebulizer every 6 hours  atorvastatin 20 milliGRAM(s) Oral at bedtime  bacitracin   Ointment 1 Application(s) Topical two times a day  ceFAZolin   IVPB 2000 milliGRAM(s) IV Intermittent every 12 hours  chlorhexidine 2% Cloths 1 Application(s) Topical daily  cholestyramine Powder (Sugar-Free) 4 Gram(s) Oral every 24 hours  dextrose 10% Bolus 125 milliLiter(s) IV Bolus once  dextrose 5%. 1000 milliLiter(s) (100 mL/Hr) IV Continuous <Continuous>  dextrose 5%. 1000 milliLiter(s) (50 mL/Hr) IV Continuous <Continuous>  dextrose 50% Injectable 25 Gram(s) IV Push once  dextrose 50% Injectable 12.5 Gram(s) IV Push once  glucagon  Injectable 1 milliGRAM(s) IntraMuscular once  hydrocortisone Enema 100 milliGRAM(s) Rectal every 12 hours  insulin lispro (ADMELOG) corrective regimen sliding scale   SubCutaneous every 6 hours  levothyroxine Injectable 40 MICROGram(s) IV Push <User Schedule>  lipid, fat emulsion (Fish Oil and Plant Based) 20% Infusion 0.51 Gm/kG/Day (20.83 mL/Hr) IV Continuous <Continuous>  melatonin 5 milliGRAM(s) Oral at bedtime  pancrelipase  (CREON 24,000 Lipase Units) 1 Capsule(s) Oral three times a day with meals  Parenteral Nutrition - Adult 1 Each TPN Continuous <Continuous>  Parenteral Nutrition - Adult 1 Each TPN Continuous <Continuous>  venlafaxine XR. 300 milliGRAM(s) Oral daily    MEDICATIONS  (PRN):  dextrose Oral Gel 15 Gram(s) Oral once PRN Blood Glucose LESS THAN 70 milliGRAM(s)/deciliter      Vital Signs Last 24 Hrs  T(C): 36.4 (17 May 2024 05:03), Max: 37.3 (16 May 2024 21:39)  T(F): 97.5 (17 May 2024 05:03), Max: 99.1 (16 May 2024 21:39)  HR: 100 (17 May 2024 07:00) (98 - 105)  BP: 99/54 (17 May 2024 07:00) (93/52 - 125/58)  BP(mean): 73 (17 May 2024 07:00) (69 - 84)  RR: 25 (17 May 2024 07:00) (17 - 44)  SpO2: 97% (17 May 2024 07:00) (94% - 100%)    Parameters below as of 17 May 2024 07:00  Patient On (Oxygen Delivery Method): room air    Physical Exam:  Gen:  AAOx3  Skin:  No jaundice, scattered ecchymosis  Lungs no accessory use  ABD Ileostomy present, stoma is pink and appears healthy.  +small amount of blood in ostomy bag   Bruising noted on bilateral arms and lower extremities   EXT; bilateral LE and scrotal edema               LABS:                                     8.2    14.24 )-----------( 49       ( 17 May 2024 05:30 )             25.5         CBC Full  -  ( 17 May 2024 05:30 )  WBC Count : 14.24 K/uL  RBC Count : 2.92 M/uL  Hemoglobin : 8.2 g/dL  Hematocrit : 25.5 %  Platelet Count - Automated : 49 K/uL  Mean Cell Volume : 87.3 fl  Mean Cell Hemoglobin : 28.1 pg  Mean Cell Hemoglobin Concentration : 32.2 gm/dL  Auto Neutrophil # : x  Auto Lymphocyte # : x  Auto Monocyte # : x  Auto Eosinophil # : x  Auto Basophil # : x  Auto Neutrophil % : x  Auto Lymphocyte % : x  Auto Monocyte % : x  Auto Eosinophil % : x  Auto Basophil % : x        05-17    138  |  100  |  72<H>  ----------------------------<  127<H>  3.7   |  31  |  1.82<H>    Ca    7.9<L>      17 May 2024 05:30  Phos  3.9     05-17  Mg     2.0     05-17    TPro  5.2<L>  /  Alb  2.5<L>  /  TBili  8.1<H>  /  DBili  5.8<H>  /  AST  144<H>  /  ALT  37  /  AlkPhos  97  05-17        PT/INR - ( 17 May 2024 05:30 )   PT: 16.5 sec;   INR: 1.46          PTT - ( 17 May 2024 05:30 )  PTT:34.4 sec

## 2024-05-17 NOTE — PROCEDURE NOTE - NSSITEPREP_SKIN_A_CORE
chlorhexidine
chlorhexidine/Adherence to aseptic technique: hand hygiene prior to donning barriers (gown, gloves), don cap and mask, sterile drape over patient
chlorhexidine
chlorhexidine/Adherence to aseptic technique: hand hygiene prior to donning barriers (gown, gloves), don cap and mask, sterile drape over patient
chlorhexidine
chlorhexidine/Adherence to aseptic technique: hand hygiene prior to donning barriers (gown, gloves), don cap and mask, sterile drape over patient

## 2024-05-17 NOTE — PROCEDURE NOTE - NSPROCNAME_GEN_A_CORE
Peripheral Line Insertion
Interventional Radiology
PICC Line Insertion
Arterial Puncture/Cannulation
Central Line Insertion

## 2024-05-17 NOTE — ADVANCED PRACTICE NURSE CONSULT - ASSESSMENT
No bleeding over night. Last pouch change 5/13, holding without leakage.    RLQ ileostomy: pinpoint opening, thin light brown effluent. Peristomal skin intact.   Fistula: stoma and peristomal wound stable, no active bleeding. Large amt of thin light brown effluent.  Fungal rash improved.     New Coloplast pouch #[id number] with window applied around wound/fistula. Periwound fungal rash, Nystatin powder applied then sealed in with Cavilon powder. Periwound protected with stoma rings and stoma paste prior to placing pouch. 
New Coloplast pouch #[id number] with window applied around wound/fistula. Periwound fungal rash, Nystatin powder applied then sealed in with Cavilon powder. Periwound protected with stoma rings and stoma paste prior to placing pouch. No bleeding noted during pouch change, extra supplies, including Surgicel, at bedside.

## 2024-05-17 NOTE — PROGRESS NOTE ADULT - ASSESSMENT
Patient is a 77yo M with complicated history,, PMH of AFib/Flutter s/p DCCV, Crohn's, prior ileocectomy and open appendectomy, extensive abdominal surgeries and ileostomy, short-bowel syndrome, hypothyroidism, depression, recently seen at the Emergency Department for blood transfusion and multiple recent admissions for transfusions who presents as a direct admission for recurrent anemia. FFP x 3 on May 3rd.     Factors IX and XI are low consistent with liver dysfunction. Low-normal Fibrinogen    elevated FVIII and VWF which are acute phase reactants  Reason for liver dysfunction is unexplained.  would trend fibrinogen with low threshold for cryo if fibrinogen decreasing  Bleeding is likely associated with liver dysregulation and decreased factor production.     Clinically considerably better  bleeding resolved  S/p plt transfusion and FFP on 5/15  Monitor CBC closely  Ideally would keep Plt > 50,000  PRBC as clinically indicated or Hgb <7  Arrangements being made for transfer to [facility]      Thank you      Shruthi Thompson MD  for Dr Reyes Villatoro   [phone]

## 2024-05-17 NOTE — PROGRESS NOTE ADULT - SUBJECTIVE AND OBJECTIVE BOX
INTERVAL HPI/OVERNIGHT EVENTS:  ON: AMRITA.     SUBJECTIVE:  Patient seen and examined by chief resident and team on AM rounds. Patient was net negative approx 1.4-1.8L since diuresis yesterday. Cr 1.8 (1.6), will hold on diuresis for today, however he remains total body overloaded, improved from before. TPN reordered. PICC line to be placed today for TPN and ancef.     MEDICATIONS  (STANDING):  albuterol/ipratropium for Nebulization 3 milliLiter(s) Nebulizer every 6 hours  atorvastatin 20 milliGRAM(s) Oral at bedtime  bacitracin   Ointment 1 Application(s) Topical two times a day  ceFAZolin   IVPB      ceFAZolin   IVPB 2000 milliGRAM(s) IV Intermittent every 8 hours  chlorhexidine 2% Cloths 1 Application(s) Topical daily  cholestyramine Powder (Sugar-Free) 4 Gram(s) Oral every 24 hours  dextrose 10% Bolus 125 milliLiter(s) IV Bolus once  dextrose 5%. 1000 milliLiter(s) (100 mL/Hr) IV Continuous <Continuous>  dextrose 5%. 1000 milliLiter(s) (50 mL/Hr) IV Continuous <Continuous>  dextrose 50% Injectable 25 Gram(s) IV Push once  dextrose 50% Injectable 12.5 Gram(s) IV Push once  ergocalciferol 33897 Unit(s) Oral <User Schedule>  glucagon  Injectable 1 milliGRAM(s) IntraMuscular once  hydrocortisone Enema 100 milliGRAM(s) Rectal every 12 hours  insulin lispro (ADMELOG) corrective regimen sliding scale   SubCutaneous every 6 hours  levothyroxine Injectable 40 MICROGram(s) IV Push <User Schedule>  melatonin 5 milliGRAM(s) Oral at bedtime  metoprolol tartrate Injectable 5 milliGRAM(s) IV Push every 6 hours  nystatin Powder 1 Application(s) Topical <User Schedule>  OMEGAVEN 10 GRAMS/100 ML 56 Gram(s)   (0.6 mL/Hr) IV Continuous <Continuous>  Omegaven 10grams/100mL 56 Gram(s),IV Solution 560 milliLiter(s) 56 Gram(s) (46.67 mL/Hr) IV Continuous <Continuous>  pancrelipase  (CREON 24,000 Lipase Units) 1 Capsule(s) Oral three times a day with meals  Parenteral Nutrition - Adult 1 Each (83 mL/Hr) TPN Continuous <Continuous>  Parenteral Nutrition - Adult 1 Each (83 mL/Hr) TPN Continuous <Continuous>  ursodiol Capsule 300 milliGRAM(s) Oral every 8 hours  venlafaxine XR. 300 milliGRAM(s) Oral daily    MEDICATIONS  (PRN):  dextrose Oral Gel 15 Gram(s) Oral once PRN Blood Glucose LESS THAN 70 milliGRAM(s)/deciliter  LORazepam   Injectable 0.25 milliGRAM(s) IV Push daily PRN Anxiety  ondansetron Injectable 4 milliGRAM(s) IV Push every 6 hours PRN Nausea and/or Vomiting      Vital Signs Last 24 Hrs  T(C): 36.6 (17 May 2024 09:00), Max: 37.3 (16 May 2024 21:39)  T(F): 97.8 (17 May 2024 09:00), Max: 99.1 (16 May 2024 21:39)  HR: 100 (17 May 2024 10:45) (98 - 105)  BP: 109/56 (17 May 2024 09:00) (93/52 - 125/58)  BP(mean): 78 (17 May 2024 09:00) (69 - 84)  RR: 18 (17 May 2024 09:00) (18 - 44)  SpO2: 99% (17 May 2024 10:45) (94% - 100%)    Parameters below as of 17 May 2024 10:45  Patient On (Oxygen Delivery Method): room air        PHYSICAL EXAM:  Constitutional: A&Ox3. NAD. Resting comfortably in chair.  HEENT: MMM. Icteric sclerae.   Respiratory: CTAB, non labored breathing, no respiratory distress. Satting well on NC.   Cardiovascular: Irregularly irregular rate and rhythm.   Gastrointestinal: soft, NTND abdomen. R side ileostomy with minimal output. Midline ECF without any stigmata of recent bleed, no blood in pouch. Fistula appears p/p/p. Granulation tissues noted surrounding fistula with eakins pouch overlying.   Genitourinary: voids.   Extremities: WWP, 3+ pitting edema noted throughout BLE.   Skin: Jaundiced.   Lines: R IJ TLC. L EJ PIV. Pending PICC.       I&O's Detail    16 May 2024 07:01  -  17 May 2024 07:00  --------------------------------------------------------  IN:    freetext medication - Infusion: 560.4 mL    IV PiggyBack: 300 mL    IV PiggyBack: 100 mL    Oral Fluid: 880 mL    PRBCs (Packed Red Blood Cells): 300 mL    TPN (Total Parenteral Nutrition): 1992 mL  Total IN: 4132.4 mL    OUT:    Drain (mL): 2650 mL    Ileostomy (mL): 0 mL    Voided (mL): 2920 mL  Total OUT: 5570 mL    Total NET: -1437.6 mL      17 May 2024 07:01  -  17 May 2024 10:59  --------------------------------------------------------  IN:    Oral Fluid: 120 mL    TPN (Total Parenteral Nutrition): 249 mL  Total IN: 369 mL    OUT:    Voided (mL): 300 mL  Total OUT: 300 mL    Total NET: 69 mL          LABS:                        8.2    14.24 )-----------( 49       ( 17 May 2024 05:30 )             25.5     05-17    138  |  100  |  72<H>  ----------------------------<  127<H>  3.7   |  31  |  1.82<H>    Ca    7.9<L>      17 May 2024 05:30  Phos  3.9     05-17  Mg     2.0     05-17    TPro  5.2<L>  /  Alb  2.5<L>  /  TBili  8.1<H>  /  DBili  5.8<H>  /  AST  144<H>  /  ALT  37  /  AlkPhos  97  05-17    PT/INR - ( 17 May 2024 05:30 )   PT: 16.5 sec;   INR: 1.46          PTT - ( 17 May 2024 05:30 )  PTT:34.4 sec  Urinalysis Basic - ( 17 May 2024 05:30 )    Color: x / Appearance: x / SG: x / pH: x  Gluc: 127 mg/dL / Ketone: x  / Bili: x / Urobili: x   Blood: x / Protein: x / Nitrite: x   Leuk Esterase: x / RBC: x / WBC x   Sq Epi: x / Non Sq Epi: x / Bacteria: x        RADIOLOGY & ADDITIONAL STUDIES:

## 2024-05-17 NOTE — PROCEDURE NOTE - NSPOSTPRCRAD_GEN_A_CORE
central line located in the/central line located in the superior vena cava/no pneumothorax
chest radiograph

## 2024-05-17 NOTE — PROCEDURAL SAFETY CHECKLIST WITH OR WITHOUT SEDATION - NSPREPROCFT_GEN_ALL_CORE
ileostomy exp fistula
Left Radial A-line
Lt Upper Extremity Double lumen PICC
R IJ Central Line Insertion

## 2024-05-17 NOTE — PROCEDURE NOTE - NSINFORMCONSENT_GEN_A_CORE
This was an emergent procedure.
Benefits, risks, and possible complications of procedure explained to patient/caregiver who verbalized understanding and gave verbal consent.
Benefits, risks, and possible complications of procedure explained to patient/caregiver who verbalized understanding and gave verbal consent.
Benefits, risks, and possible complications of procedure explained to patient/caregiver who verbalized understanding and gave written consent.
This was an emergent procedure.
Benefits, risks, and possible complications of procedure explained to patient/caregiver who verbalized understanding and gave verbal consent.
Benefits, risks, and possible complications of procedure explained to patient/caregiver who verbalized understanding and gave written consent.

## 2024-05-17 NOTE — PROGRESS NOTE ADULT - ATTENDING COMMENTS
AF, Crohn's, SBR, ileostomy, enteroatmospheric fistula, MSSA bacteremia, recurrent acute blood loss anemia  physical as above  hold off on further diuresis today with slight increase in creatinine  TPN with protein 1.6 g/kg/d; omegaven  cotinue ancef but decrease to 2 g q 12h with increase in creatinine  H/h stable  plan for transfer to [facility]

## 2024-05-17 NOTE — PROGRESS NOTE ADULT - ASSESSMENT
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 Prolonged hospital course significant for recurrent AKIs, cholestatic transaminitis s/p perc cony, prior DVTs, PNAs, fungemia, sinus pauses s/p pacemaker and recurrent bleeding from fistula most recently s/p IR embolization of L branch of inferior epigastric artery via R groin access (4/16), with multiple readmissions for continued bleeding from fistula and ostomy sites. S/p ileoscopy on noting an ulcer w/ clot proximal to stoma w/ hemoclip was applied (5/1). Admitted for symptomatic anemia secondary to bleeding from fistula site and ileostomy sites. Transferred to SICU 5/8 overnight for 2L blood loss from fistula site with subsequent hypotension. Now s/p ileoscopy, noting erythematous patch of bowel just deep to the ECF, without any active bleeding (5/9). S/p IR angiography noting  several areas of hyperemia in the jejunum adjacent to the stoma however no evidence of active bleeding (5/9). Pending possible transfer to [facility] for further surgical management.     Neuro: Dilaudid prn for pain. Hx: Anxiety: Continue Effexor. Ambien on hold   CV: Hemorrhagic shock on 5/8, 2/2 EC Fistula bleed, now resolved. 2L of bright red blood. S/p total of 3 units of PRBCs (overnight 5/8), responded appropriately 8.3 (5). Keep MAPs > 65. Hx: AF: Restart Metoprolol today for continued tachycardia given BP improved. HLD: Continue Atorvastatin. Total body overload: Will hold Lasix today, given rising Cr. Goal I/O is negative 1-2L/day. + MSSA bacteremia: F/u Echo for vegetation negative on 5/13.   Pulm: Pleural Effusions b/l Rt improved on 5/12 xray. Left continued to be present. Will Cont to Diurese with Lasix. Satting low 90's on RA, nightly BIPAP at 40%.   GI: CLD. GI Bleed vs bleeding at fistula site: IR 5/9: segmental hyperemia, unable to embolize c/f bowel ischemia. Ileoscopy 5/9: erythematous patch of bowel just deep to the ECF, without active bleeding. CT 5/2 with bowel wall thickening. T bili max is 9.7, now downtrending. As per Surgical team NTD, no hepatology c/s at this time, US negative for pathology, Cont cholestyramine. C/w Ursodiol.  Cont Creon. Crohns: Holding methylprednisolone IV, started hydrocortisone enemas 5/10 as per GI. Continue TPN/ change Lipids to omegaven. Will hold off on remicade in setting of MSSA bacteremia. portal vein thrombus on duplex from 5/13 - unable to give A/C 2* bleed   : Voids. ELVI likely from acute blood loss, initially resolving, now uptrending given recent diuresis (Cr 1.8 (1.6)). Baseline 1.2 - 1.4.   ID: Bacteremia 2/2 MSSA grown in BCx (5/9) repeat Bld cx's on 5/11 NGTD. Old PICC line removed. Echo negative for vegetation. WBC improved and afebrile. 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 Pt with hx of + blood cx's with candida glabrata from 11/24/23  Start Ancef (5/10-6/10), total of 4 week course in setting of hardware as per ID. d/c: // Cefepime (5/10), Vanc x1 (5/9)  Endo: mISSc. Hx: Hypothyroidism. Continue synthroid.   PPx: SCDs, SQH on hold for bleeding.   Heme: Anemia 2/2 EC fistula bleed. Received a total of 12 units of PRBCs, 2 FFP,3 plt, 1 cryo this admission. Trend CBC. 5 of Vit k removed from TPN on 5/14. C/w monitoring plt levels.   Wounds: Wound manager present with dressing changes done by Wound care team. Bacitracin to rt arm skin tear  Lines: R. IJ TLC, L EJ PIV x1. Pending PICC today.  PT: Ordered OOBTC daily.   Dispo: SICU, possible transfer to [facility] intestinal rehab center on Sunday 5/19

## 2024-05-18 NOTE — PROGRESS NOTE ADULT - ASSESSMENT
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 Prolonged hospital course significant for recurrent AKIs, cholestatic transaminitis s/p perc cony, prior DVTs, PNAs, fungemia, sinus pauses s/p pacemaker and recurrent bleeding from fistula most recently s/p IR embolization of L branch of inferior epigastric artery via R groin access (4/16), with multiple readmissions for continued bleeding from fistula and ostomy sites. S/p ileoscopy on noting an ulcer w/ clot proximal to stoma w/ hemoclip was applied (5/1). Admitted for symptomatic anemia secondary to bleeding from fistula site and ileostomy sites. Transferred to SICU 5/8 overnight for 2L blood loss from fistula site with subsequent hypotension. Now s/p ileoscopy, noting erythematous patch of bowel just deep to the ECF, without any active bleeding (5/9). S/p IR angiography noting  several areas of hyperemia in the jejunum adjacent to the stoma however no evidence of active bleeding (5/9). Pending transfer to [facility] for further surgical management.     Neuro: Pain: Dilaudid PRN. Hx: Anxiety: Continue Effexor and Ativan PRN. Ambien on hold. Nausea: Zofran PRN.  CV: Hemorrhagic shock on 5/8, 2/2 EC Fistula bleed, resolving. Keep MAPs > 65. Hx AFib: cont Metoprolol 5mg q6, no AC given bleeding risk. Hx HLD: cont Atorvastatin. Total body overload: Will hold Lasix today, Goal I/O is net 0 to neg 1L. MSSA bacteremia: TTE (5/13) no vegetation.   Pulm: Pleural Effusions b/l Rt improved on 5/12 xray. Left continued to be present. Will Cont to Diurese with Lasix. Satting low 90's on RA, nightly BIPAP at 40%.   GI: CLD. GI Bleed vs bleeding at fistula site: IR 5/9: segmental hyperemia, unable to embolize c/f bowel ischemia. Ileoscopy 5/9: erythematous patch of bowel just deep to the ECF, without active bleeding. CT 5/2 with bowel wall thickening. T bili max is 9.7, now downtrending. As per Surgical team NTD, no hepatology c/s at this time, US negative for pathology, Cont cholestyramine. C/w Ursodiol. Cont Creon. Crohns: Holding methylprednisolone IV, started hydrocortisone enemas 5/10 as per GI. Continue TPN/ change Lipids to omegaven. Will hold off on remicade in setting of MSSA bacteremia. portal vein thrombus on duplex from 5/13 - unable to give A/C 2* bleed   : Voids. ELVI likely from acute blood loss, resolving, now uptrending Cr given recent diuresis. Baseline 1.2 - 1.4. No lasix today.  ID: Bacteremia 2/2 MSSA grown in BCx (5/9) repeat Bld cx's on 5/11 NGTD. Old PICC line removed. TTE negative for vegetation. WBC improved and afebrile. 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 Pt with hx of + blood cx's with candida glabrata from 11/24/23. Cont Ancef (5/10-6/10), total of 4 week course in setting of hardware as per ID. d/c: // Cefepime (5/10), Vanc x1 (5/9)  Endo: mISSc. Hx: Hypothyroidism. Continue synthroid.   PPx: SCDs, SQH on hold for bleeding.   Heme: Anemia 2/2 EC fistula bleed. Received a total of 12 units of PRBCs, 2 FFP,3 plt, 1 cryo this admission. Trend CBC. 5 of Vit k removed from TPN on 5/14. C/w monitoring plt levels. Intermittent wound bed bleed: Next CBC at noon.  Wounds: Wound manager present with dressing changes done by Wound care team. Bacitracin to rt arm skin tear  Lines: L. PICC (5/17--), Remove RIJ TLC (5/9--), L EJ PIV x1(5/8--), PICC (5/17--).  PT: Ordered OOBTC daily.   Dispo: SICU, possible transfer to [facility] intestinal rehab center on Sunday 5/19

## 2024-05-18 NOTE — PROGRESS NOTE ADULT - ATTENDING COMMENTS
CRS attending Coverage for Dr Peterson  Seen on rounds and discussed with housestaff  BRB overnight s/p suture ligation  Patient without complaints at presents  H/H 8.4/25.6 from 8.5/25.6 - trend H/H  ICU care appreciated  Continue supportive care  Pending transfer to [facility]

## 2024-05-18 NOTE — PROGRESS NOTE ADULT - SUBJECTIVE AND OBJECTIVE BOX
INTERVAL/OVERNIGHT EVENTS: Bled from wound base last night, vicryl placed. 2am Hg 8.5 (8.8).     SUBJECTIVE: This AM, doing well without complaints. No LH or dizziness. No CP/SOB.     POD #  SICU Day #    Neurologic Medications  LORazepam   Injectable 0.25 milliGRAM(s) IV Push daily PRN Anxiety  melatonin 5 milliGRAM(s) Oral at bedtime  ondansetron Injectable 4 milliGRAM(s) IV Push every 6 hours PRN Nausea and/or Vomiting  venlafaxine XR. 300 milliGRAM(s) Oral daily    Respiratory Medications  albuterol/ipratropium for Nebulization 3 milliLiter(s) Nebulizer every 6 hours    Cardiovascular Medications  metoprolol tartrate Injectable 5 milliGRAM(s) IV Push every 6 hours    Gastrointestinal Medications  dextrose 10% Bolus 125 milliLiter(s) IV Bolus once  dextrose 5%. 1000 milliLiter(s) IV Continuous <Continuous>  dextrose 5%. 1000 milliLiter(s) IV Continuous <Continuous>  ergocalciferol 88128 Unit(s) Oral <User Schedule>  pancrelipase  (CREON 24,000 Lipase Units) 1 Capsule(s) Oral three times a day with meals  Parenteral Nutrition - Adult 1 Each TPN Continuous <Continuous>  Parenteral Nutrition - Adult 1 Each TPN Continuous <Continuous>  potassium chloride  20 mEq/100 mL IVPB 20 milliEquivalent(s) IV Intermittent every 2 hours  ursodiol Capsule 300 milliGRAM(s) Oral every 8 hours    Genitourinary Medications    Hematologic/Oncologic Medications    Antimicrobial/Immunologic Medications  ceFAZolin   IVPB 2000 milliGRAM(s) IV Intermittent every 12 hours    Endocrine/Metabolic Medications  atorvastatin 20 milliGRAM(s) Oral at bedtime  cholestyramine Powder (Sugar-Free) 4 Gram(s) Oral every 24 hours  dextrose 50% Injectable 25 Gram(s) IV Push once  dextrose 50% Injectable 12.5 Gram(s) IV Push once  dextrose Oral Gel 15 Gram(s) Oral once PRN Blood Glucose LESS THAN 70 milliGRAM(s)/deciliter  glucagon  Injectable 1 milliGRAM(s) IntraMuscular once  hydrocortisone Enema 100 milliGRAM(s) Rectal every 12 hours  insulin lispro (ADMELOG) corrective regimen sliding scale   SubCutaneous every 6 hours  levothyroxine Injectable 40 MICROGram(s) IV Push <User Schedule>    Topical/Other Medications  bacitracin   Ointment 1 Application(s) Topical two times a day  chlorhexidine 2% Cloths 1 Application(s) Topical daily  nystatin Powder 1 Application(s) Topical <User Schedule>  OMEGAVEN 10 GRAMS/100 ML 56 Gram(s) 56 Gram(s) IV Continuous <Continuous>  OMEGAVEN 10 GRAMS/100 ML 56 Gram(s) 560 milliLiter(s) IV Continuous <Continuous>      MEDICATIONS  (PRN):  dextrose Oral Gel 15 Gram(s) Oral once PRN Blood Glucose LESS THAN 70 milliGRAM(s)/deciliter  LORazepam   Injectable 0.25 milliGRAM(s) IV Push daily PRN Anxiety  ondansetron Injectable 4 milliGRAM(s) IV Push every 6 hours PRN Nausea and/or Vomiting      I&O's Detail    17 May 2024 07:01  -  18 May 2024 07:00  --------------------------------------------------------  IN:    freetext medication - Infusion: 186.8 mL    freetext medication - Infusion: 373.6 mL    IV PiggyBack: 100 mL    IV PiggyBack: 50 mL    Oral Fluid: 1160 mL    Platelets - Single Donor: 221 mL    PRBCs (Packed Red Blood Cells): 496 mL    TPN (Total Parenteral Nutrition): 1992 mL  Total IN: 4579.4 mL    OUT:    Blood Loss (mL): 500 mL    Drain (mL): 3430 mL    Voided (mL): 1650 mL  Total OUT: 5580 mL    Total NET: -1000.6 mL      18 May 2024 07:01  -  18 May 2024 10:17  --------------------------------------------------------  IN:    IV PiggyBack: 150 mL    TPN (Total Parenteral Nutrition): 249 mL  Total IN: 399 mL    OUT:    Drain (mL): 1450 mL  Total OUT: 1450 mL    Total NET: -1051 mL          Vital Signs Last 24 Hrs  T(C): 36.2 (18 May 2024 09:00), Max: 36.6 (17 May 2024 12:00)  T(F): 97.2 (18 May 2024 09:00), Max: 97.8 (17 May 2024 12:00)  HR: 99 (18 May 2024 09:00) (97 - 100)  BP: 94/49 (18 May 2024 09:00) (90/51 - 117/58)  BP(mean): 69 (18 May 2024 09:00) (67 - 83)  RR: 21 (18 May 2024 09:00) (14 - 64)  SpO2: 98% (18 May 2024 09:00) (95% - 100%)    Parameters below as of 18 May 2024 10:00  Patient On (Oxygen Delivery Method): room air        GENERAL: NAD, resting comfortably in bed  HEENT: NCAT, MMM  C/V: Normal rate, normal peripheral perfusion  PULM: Nonlabored breathing, no respiratory distress,   ABD: Soft, ND, NT, no rebound tenderness, no guarding  EXTREM: WWP, no edema, SCDs in place  NEURO: No focal deficits    LABS:                        8.4    15.44 )-----------( 70       ( 18 May 2024 05:30 )             25.6     05-18    136  |  98  |  70<H>  ----------------------------<  123<H>  3.4<L>   |  29  |  1.68<H>    Ca    8.0<L>      18 May 2024 05:30  Phos  4.3     05-18  Mg     2.1     05-18    TPro  5.3<L>  /  Alb  2.4<L>  /  TBili  7.9<H>  /  DBili  5.4<H>  /  AST  161<H>  /  ALT  41  /  AlkPhos  99  05-18    PT/INR - ( 18 May 2024 05:30 )   PT: 15.0 sec;   INR: 1.33          PTT - ( 18 May 2024 05:30 )  PTT:34.6 sec  Urinalysis Basic - ( 18 May 2024 05:30 )    Color: x / Appearance: x / SG: x / pH: x  Gluc: 123 mg/dL / Ketone: x  / Bili: x / Urobili: x   Blood: x / Protein: x / Nitrite: x   Leuk Esterase: x / RBC: x / WBC x   Sq Epi: x / Non Sq Epi: x / Bacteria: x        RADIOLOGY & ADDITIONAL STUDIES:      Culture - Blood (collected 05-13-24 @ 17:18)  Source: .Blood Blood-Peripheral  Preliminary Report (05-18-24 @ 01:01):    No growth at 4 days     INTERVAL/OVERNIGHT EVENTS: Bled from wound base last night, vicryl placed. 2am Hg 8.5 (8.8).     SUBJECTIVE: This AM, doing well without complaints. No LH or dizziness. No CP/SOB. Ice chips and liquids going well. Voids without issues.    Neurologic Medications  LORazepam   Injectable 0.25 milliGRAM(s) IV Push daily PRN Anxiety  melatonin 5 milliGRAM(s) Oral at bedtime  ondansetron Injectable 4 milliGRAM(s) IV Push every 6 hours PRN Nausea and/or Vomiting  venlafaxine XR. 300 milliGRAM(s) Oral daily    Respiratory Medications  albuterol/ipratropium for Nebulization 3 milliLiter(s) Nebulizer every 6 hours    Cardiovascular Medications  metoprolol tartrate Injectable 5 milliGRAM(s) IV Push every 6 hours    Gastrointestinal Medications  ergocalciferol 48818 Unit(s) Oral <User Schedule>  pancrelipase  (CREON 24,000 Lipase Units) 1 Capsule(s) Oral three times a day with meals  Parenteral Nutrition - Adult 1 Each TPN Continuous <Continuous>  potassium chloride  20 mEq/100 mL IVPB 20 milliEquivalent(s) IV Intermittent every 2 hours  ursodiol Capsule 300 milliGRAM(s) Oral every 8 hours    Antimicrobial/Immunologic Medications  ceFAZolin   IVPB 2000 milliGRAM(s) IV Intermittent every 12 hours    Endocrine/Metabolic Medications  atorvastatin 20 milliGRAM(s) Oral at bedtime  cholestyramine Powder (Sugar-Free) 4 Gram(s) Oral every 24 hours  hydrocortisone Enema 100 milliGRAM(s) Rectal every 12 hours  insulin lispro (ADMELOG) corrective regimen sliding scale   SubCutaneous every 6 hours  levothyroxine Injectable 40 MICROGram(s) IV Push <User Schedule>    Topical/Other Medications  bacitracin   Ointment 1 Application(s) Topical two times a day  chlorhexidine 2% Cloths 1 Application(s) Topical daily  nystatin Powder 1 Application(s) Topical <User Schedule>  OMEGAVEN 10 GRAMS/100 ML 56 Gram(s) 560 milliLiter(s) IV Continuous <Continuous>    MEDICATIONS  (PRN):  LORazepam   Injectable 0.25 milliGRAM(s) IV Push daily PRN Anxiety  ondansetron Injectable 4 milliGRAM(s) IV Push every 6 hours PRN Nausea and/or Vomiting    I&O's Detail    17 May 2024 07:01  -  18 May 2024 07:00  --------------------------------------------------------  IN:    freetext medication - Infusion: 186.8 mL    freetext medication - Infusion: 373.6 mL    IV PiggyBack: 100 mL    IV PiggyBack: 50 mL    Oral Fluid: 1160 mL    Platelets - Single Donor: 221 mL    PRBCs (Packed Red Blood Cells): 496 mL    TPN (Total Parenteral Nutrition): 1992 mL  Total IN: 4579.4 mL    OUT:    Blood Loss (mL): 500 mL    Drain (mL): 3430 mL    Voided (mL): 1650 mL  Total OUT: 5580 mL    Total NET: -1000.6 mL    18 May 2024 07:01  -  18 May 2024 10:17  --------------------------------------------------------  IN:    IV PiggyBack: 150 mL    TPN (Total Parenteral Nutrition): 249 mL  Total IN: 399 mL    OUT:    Drain (mL): 1450 mL  Total OUT: 1450 mL    Total NET: -1051 mL    Vital Signs Last 24 Hrs  T(C): 36.2 (18 May 2024 09:00), Max: 36.6 (17 May 2024 12:00)  T(F): 97.2 (18 May 2024 09:00), Max: 97.8 (17 May 2024 12:00)  HR: 99 (18 May 2024 09:00) (97 - 100)  BP: 94/49 (18 May 2024 09:00) (90/51 - 117/58)  BP(mean): 69 (18 May 2024 09:00) (67 - 83)  RR: 21 (18 May 2024 09:00) (14 - 64)  SpO2: 98% (18 May 2024 09:00) (95% - 100%)    Parameters below as of 18 May 2024 10:00  Patient On (Oxygen Delivery Method): room air    GENERAL: NAD, resting comfortably in bed, follows commands, AxOx3  HEENT: NCAT, MMM, jaundiced  C/V: Normal rate, normal peripheral perfusion, sinus no murmurs.  PULM: Nonlabored breathing, no respiratory distress, CTA b/l  ABD: Soft, ND, NT, no rebound tenderness, no guarding, wound manager in place with bilious output.   EXTREM: WWP, 2+ pitting edema, SCDs in place  NEURO: No focal deficits    LABS:                        8.4    15.44 )-----------( 70       ( 18 May 2024 05:30 )             25.6     05-18    136  |  98  |  70<H>  ----------------------------<  123<H>  3.4<L>   |  29  |  1.68<H>    Ca    8.0<L>      18 May 2024 05:30  Phos  4.3     05-18  Mg     2.1     05-18    TPro  5.3<L>  /  Alb  2.4<L>  /  TBili  7.9<H>  /  DBili  5.4<H>  /  AST  161<H>  /  ALT  41  /  AlkPhos  99  05-18    PT/INR - ( 18 May 2024 05:30 )   PT: 15.0 sec;   INR: 1.33       PTT - ( 18 May 2024 05:30 )  PTT:34.6 sec  Urinalysis Basic - ( 18 May 2024 05:30 )    Color: x / Appearance: x / SG: x / pH: x  Gluc: 123 mg/dL / Ketone: x  / Bili: x / Urobili: x   Blood: x / Protein: x / Nitrite: x   Leuk Esterase: x / RBC: x / WBC x   Sq Epi: x / Non Sq Epi: x / Bacteria: x    RADIOLOGY & ADDITIONAL STUDIES:    Culture - Blood (collected 05-13-24 @ 17:18)  Source: .Blood Blood-Peripheral  Preliminary Report (05-18-24 @ 01:01):    No growth at 4 days

## 2024-05-18 NOTE — PROGRESS NOTE ADULT - SUBJECTIVE AND OBJECTIVE BOX
Coverage for Dr. Villatoro  Retired [employer] here for close to 2 weeks with recurrent GI bleeds at side of an entero cutaneous fistula, previously scoped and bleeding site at anastomosis clipped by Kwabena Santos. History of Crohn disease . waiting for transfer to [facility] Liver transplant program. Afebrile. On TPN via PICC line  On exam: Oriented x 3. HEENT; NC/AT. Conjunctivae icteric. No oral mucositis. Multiple skin ecchymoses. Clear lungs, decrease sounds at lung bases. Cor; normal S1,S2. Abdomen soft, iliostomy bag in place, serosanguinous fluid present in the bag. Neuro; intact  CBC Full  -  ( 18 May 2024 05:30 )  WBC Count : 15.44 K/uL  RBC Count : 2.95 M/uL  Hemoglobin : 8.4 g/dL  Hematocrit : 25.6 %  Platelet Count - Automated : 70 K/uL  Mean Cell Volume : 86.8 fl  Mean Cell Hemoglobin : 28.5 pg  Mean Cell Hemoglobin Concentration : 32.8 gm/dL  Auto Neutrophil # : x  Auto Lymphocyte # : x  Auto Monocyte # : x  Auto Eosinophil # : x  Auto Basophil # : x  Auto Neutrophil % : x  Auto Lymphocyte % : x  Auto Monocyte % : x  Auto Eosinophil % : x  Auto Basophil % : x  05-18    136  |  98  |  70<H>  ----------------------------<  123<H>  3.4<L>   |  29  |  1.68<H>    Ca    8.0<L>      18 May 2024 05:30  Phos  4.3     05-18  Mg     2.1     05-18    TPro  5.3<L>  /  Alb  2.4<L>  /  TBili  7.9<H>  /  DBili  5.4<H>  /  AST  161<H>  /  ALT  41  /  AlkPhos  99  05-18

## 2024-05-18 NOTE — PROGRESS NOTE ADULT - ASSESSMENT
1. Recurrent GI BLEEDS, s/p multiple PRBC transfusions, currently without significant bleeding. possible ill defined associated coagulopathy, TO CORRECT WITH FFP OR PROTHROMBIN COMPLEX ANY PT/APTT ABNORMALITIES  2.. Ill defined cholestatic liver disease, reason for transfer to [facility]  3. Thrombocytopenia, possibly due to splenomegaly, could not find a recent abdominal CT  4. Extensive edema of extremities, partially due to hypoproteinemia  5. Code status: full code  6. Discharge planning-transfer by tomorrow or Monday  to [facility]

## 2024-05-18 NOTE — PROGRESS NOTE ADULT - SUBJECTIVE AND OBJECTIVE BOX
Overnight events: 11pm CBC Hgb 8.8. (8.4) (drawn before bleeding). At approx 11:30pm, bled approx 400cc from 1 o clock and 3 o clock positions similar to earlier today. Vicryl x1 placed at 1 o clock and additional hemostasis w/ surgicell. 2am CBC Hgb 8.5 (8.8).       POD#5/9: IR - segemental hyperemia, unable to embolize 2/2 concern for bowel ischemia     SUBJECTIVE: Patient seen at bedside with chief resident. Patient reports he slept well. He denies any nausea or vomiting.       MEDICATIONS  (STANDING):  albuterol/ipratropium for Nebulization 3 milliLiter(s) Nebulizer every 6 hours  atorvastatin 20 milliGRAM(s) Oral at bedtime  bacitracin   Ointment 1 Application(s) Topical two times a day  ceFAZolin   IVPB 2000 milliGRAM(s) IV Intermittent every 12 hours  chlorhexidine 2% Cloths 1 Application(s) Topical daily  cholestyramine Powder (Sugar-Free) 4 Gram(s) Oral every 24 hours  dextrose 10% Bolus 125 milliLiter(s) IV Bolus once  dextrose 5%. 1000 milliLiter(s) (50 mL/Hr) IV Continuous <Continuous>  dextrose 5%. 1000 milliLiter(s) (100 mL/Hr) IV Continuous <Continuous>  dextrose 50% Injectable 12.5 Gram(s) IV Push once  dextrose 50% Injectable 25 Gram(s) IV Push once  ergocalciferol 00518 Unit(s) Oral <User Schedule>  glucagon  Injectable 1 milliGRAM(s) IntraMuscular once  hydrocortisone Enema 100 milliGRAM(s) Rectal every 12 hours  insulin lispro (ADMELOG) corrective regimen sliding scale   SubCutaneous every 6 hours  levothyroxine Injectable 40 MICROGram(s) IV Push <User Schedule>  melatonin 5 milliGRAM(s) Oral at bedtime  metoprolol tartrate Injectable 5 milliGRAM(s) IV Push every 6 hours  nystatin Powder 1 Application(s) Topical <User Schedule>  OMEGAVEN 10 GRAMS/100 ML 56 Gram(s) 560 milliLiter(s) (46.67 mL/Hr) IV Continuous <Continuous>  pancrelipase  (CREON 24,000 Lipase Units) 1 Capsule(s) Oral three times a day with meals  Parenteral Nutrition - Adult 1 Each (83 mL/Hr) TPN Continuous <Continuous>  potassium chloride  20 mEq/100 mL IVPB 20 milliEquivalent(s) IV Intermittent every 2 hours  ursodiol Capsule 300 milliGRAM(s) Oral every 8 hours  venlafaxine XR. 300 milliGRAM(s) Oral daily    MEDICATIONS  (PRN):  dextrose Oral Gel 15 Gram(s) Oral once PRN Blood Glucose LESS THAN 70 milliGRAM(s)/deciliter  LORazepam   Injectable 0.25 milliGRAM(s) IV Push daily PRN Anxiety  ondansetron Injectable 4 milliGRAM(s) IV Push every 6 hours PRN Nausea and/or Vomiting      Vital Signs Last 24 Hrs  T(C): 36.2 (18 May 2024 05:39), Max: 36.6 (17 May 2024 09:00)  T(F): 97.2 (18 May 2024 05:39), Max: 97.8 (17 May 2024 09:00)  HR: 99 (18 May 2024 08:00) (97 - 100)  BP: 98/54 (18 May 2024 08:00) (90/51 - 124/59)  BP(mean): 73 (18 May 2024 08:00) (67 - 85)  RR: 25 (18 May 2024 08:00) (14 - 64)  SpO2: 97% (18 May 2024 08:00) (95% - 100%)    Parameters below as of 18 May 2024 08:00  Patient On (Oxygen Delivery Method): room air        Physical Exam:  General: NAD, resting comfortably in bed, eyes sclera yellow   Pulmonary: Nonlabored breathing, no respiratory distress  Cardiovascular: NSR  Abdominal: soft, NT/ND, wound manager in place, fistula visible, brown-greenish fluid in bag, no leaks noted, ostomy with no blood  Extremities: WWP, normal strength  Neuro: A/O x 3, CNs II-XII grossly intact,    I&O's Summary    17 May 2024 07:01  -  18 May 2024 07:00  --------------------------------------------------------  IN: 4579.4 mL / OUT: 5580 mL / NET: -1000.6 mL    18 May 2024 07:01  -  18 May 2024 08:37  --------------------------------------------------------  IN: 83 mL / OUT: 0 mL / NET: 83 mL        LABS:                        8.4    15.44 )-----------( 70       ( 18 May 2024 05:30 )             25.6     05-18    136  |  98  |  70<H>  ----------------------------<  123<H>  3.4<L>   |  29  |  1.68<H>    Ca    8.0<L>      18 May 2024 05:30  Phos  4.3     05-18  Mg     2.1     05-18    TPro  5.3<L>  /  Alb  2.4<L>  /  TBili  7.9<H>  /  DBili  5.4<H>  /  AST  161<H>  /  ALT  41  /  AlkPhos  99  05-18    PT/INR - ( 18 May 2024 05:30 )   PT: 15.0 sec;   INR: 1.33          PTT - ( 18 May 2024 05:30 )  PTT:34.6 sec  Urinalysis Basic - ( 18 May 2024 05:30 )    Color: x / Appearance: x / SG: x / pH: x  Gluc: 123 mg/dL / Ketone: x  / Bili: x / Urobili: x   Blood: x / Protein: x / Nitrite: x   Leuk Esterase: x / RBC: x / WBC x   Sq Epi: x / Non Sq Epi: x / Bacteria: x      CAPILLARY BLOOD GLUCOSE      POCT Blood Glucose.: 109 mg/dL (18 May 2024 07:18)  POCT Blood Glucose.: 94 mg/dL (18 May 2024 00:37)  POCT Blood Glucose.: 130 mg/dL (17 May 2024 17:47)  POCT Blood Glucose.: 100 mg/dL (17 May 2024 10:42)    LIVER FUNCTIONS - ( 18 May 2024 05:30 )  Alb: 2.4 g/dL / Pro: 5.3 g/dL / ALK PHOS: 99 U/L / ALT: 41 U/L / AST: 161 U/L / GGT: x             RADIOLOGY & ADDITIONAL STUDIES:

## 2024-05-18 NOTE — PROGRESS NOTE ADULT - ASSESSMENT
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 Prolonged hospital course significant for recurrent AKIs, cholestatic transaminitis s/p perc cony, prior DVTs, PNAs, fungemia, sinus pauses s/p pacemaker and recurrent bleeding from fistula most recently s/p IR embolization of L branch of inferior epigastric artery via R groin access (4/16), with multiple readmissions for continued bleeding from fistula and ostomy sites. S/p ileoscopy on noting an ulcer w/ clot proximal to stoma w/ hemoclip was applied (5/1). Admitted for symptomatic anemia secondary to bleeding from fistula site and ileostomy sites. Transferred to SICU 5/8 overnight for 2L blood loss from fistula site with subsequent hypotension. Now s/p ileoscopy, noting erythematous patch of bowel just deep to the ECF, without any active bleeding (5/9). S/p IR angiography noting  several areas of hyperemia in the jejunum adjacent to the stoma however no evidence of active bleeding (5/9). Patient doing well after bleeding last night. Will continue to monitor hemoglobin and Vital signs.     TPN  afternoon CBC  Pending discharge to [facility]  Rest of care per SICU

## 2024-05-19 NOTE — PROGRESS NOTE ADULT - SUBJECTIVE AND OBJECTIVE BOX
SUBJECTIVE: Patient seen and examined bedside by chief resident. doing well and looking forward to his transfer to [facility] today. Denies nausea/ vomiting. Having output into bag. bleeding controlled at bedside.     ceFAZolin   IVPB 2000 milliGRAM(s) IV Intermittent every 12 hours  metoprolol tartrate Injectable 5 milliGRAM(s) IV Push every 6 hours      Vital Signs Last 24 Hrs  T(C): 36.9 (19 May 2024 05:03), Max: 37.3 (18 May 2024 17:32)  T(F): 98.4 (19 May 2024 05:03), Max: 99.1 (18 May 2024 17:32)  HR: 97 (19 May 2024 07:00) (96 - 101)  BP: 125/58 (19 May 2024 07:00) (93/51 - 127/60)  BP(mean): 83 (19 May 2024 07:00) (67 - 87)  RR: 22 (19 May 2024 07:00) (17 - 48)  SpO2: 97% (19 May 2024 07:00) (95% - 100%)    Parameters below as of 19 May 2024 08:00  Patient On (Oxygen Delivery Method): room air      I&O's Detail    18 May 2024 07:01  -  19 May 2024 07:00  --------------------------------------------------------  IN:    freetext medication - Infusion: 560.4 mL    IV PiggyBack: 300 mL    IV PiggyBack: 50 mL    Oral Fluid: 600 mL    TPN (Total Parenteral Nutrition): 1992 mL  Total IN: 3502.4 mL    OUT:    Drain (mL): 4325 mL    Voided (mL): 1100 mL  Total OUT: 5425 mL    Total NET: -1922.6 mL      Physical Exam:  General: NAD, resting comfortably in bed, eyes sclera yellow   Pulmonary: Nonlabored breathing, no respiratory distress  Cardiovascular: NSR  Abdominal: soft, NT/ND, wound manager in place, fistula visible, brown-greenish fluid in bag, no leaks noted, ostomy with no blood  Extremities: WWP, normal strength  Neuro: A/O x 3, CNs II-XII grossly intact,  LABS:                        8.4    19.22 )-----------( 68       ( 19 May 2024 05:30 )             24.7     05-19    133<L>  |  97  |  67<H>  ----------------------------<  113<H>  3.9   |  27  |  1.67<H>    Ca    8.1<L>      19 May 2024 05:30  Phos  3.6     05-19  Mg     2.0     05-19    TPro  5.4<L>  /  Alb  2.4<L>  /  TBili  7.6<H>  /  DBili  5.1<H>  /  AST  185<H>  /  ALT  47<H>  /  AlkPhos  103  05-19    PT/INR - ( 19 May 2024 05:30 )   PT: 15.8 sec;   INR: 1.40          PTT - ( 19 May 2024 05:30 )  PTT:34.2 sec  Urinalysis Basic - ( 19 May 2024 05:30 )    Color: x / Appearance: x / SG: x / pH: x  Gluc: 113 mg/dL / Ketone: x  / Bili: x / Urobili: x   Blood: x / Protein: x / Nitrite: x   Leuk Esterase: x / RBC: x / WBC x   Sq Epi: x / Non Sq Epi: x / Bacteria: x        RADIOLOGY & ADDITIONAL STUDIES:

## 2024-05-19 NOTE — PROGRESS NOTE ADULT - ASSESSMENT
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 Prolonged hospital course significant for recurrent AKIs, cholestatic transaminitis s/p perc cony, prior DVTs, PNAs, fungemia, sinus pauses s/p pacemaker and recurrent bleeding from fistula most recently s/p IR embolization of L branch of inferior epigastric artery via R groin access (4/16), with multiple readmissions for continued bleeding from fistula and ostomy sites. S/p ileoscopy on noting an ulcer w/ clot proximal to stoma w/ hemoclip was applied (5/1). Admitted for symptomatic anemia secondary to bleeding from fistula site and ileostomy sites. Transferred to SICU 5/8 overnight for 2L blood loss from fistula site with subsequent hypotension. Now s/p ileoscopy, noting erythematous patch of bowel just deep to the ECF, without any active bleeding (5/9). S/p IR angiography noting  several areas of hyperemia in the jejunum adjacent to the stoma however no evidence of active bleeding (5/9). Patient doing well after bleeding last night. Will continue to monitor hemoglobin and Vital signs.     TPN  afternoon CBC  Pending transfer to [facility]  Rest of care per SICU

## 2024-05-19 NOTE — PROGRESS NOTE ADULT - SUBJECTIVE AND OBJECTIVE BOX
ON: no acute events overnight.    SUBJECTIVE: No new complaints.     MEDICATIONS  (STANDING):  albuterol/ipratropium for Nebulization 3 milliLiter(s) Nebulizer every 6 hours  atorvastatin 20 milliGRAM(s) Oral at bedtime  bacitracin   Ointment 1 Application(s) Topical two times a day  ceFAZolin   IVPB 2000 milliGRAM(s) IV Intermittent every 12 hours  chlorhexidine 2% Cloths 1 Application(s) Topical daily  cholestyramine Powder (Sugar-Free) 4 Gram(s) Oral every 24 hours  dextrose 10% Bolus 125 milliLiter(s) IV Bolus once  dextrose 5%. 1000 milliLiter(s) (50 mL/Hr) IV Continuous <Continuous>  dextrose 5%. 1000 milliLiter(s) (100 mL/Hr) IV Continuous <Continuous>  dextrose 50% Injectable 12.5 Gram(s) IV Push once  dextrose 50% Injectable 25 Gram(s) IV Push once  ergocalciferol 55667 Unit(s) Oral <User Schedule>  glucagon  Injectable 1 milliGRAM(s) IntraMuscular once  hydrocortisone Enema 100 milliGRAM(s) Rectal every 12 hours  insulin lispro (ADMELOG) corrective regimen sliding scale   SubCutaneous every 6 hours  levothyroxine Injectable 40 MICROGram(s) IV Push <User Schedule>  melatonin 5 milliGRAM(s) Oral at bedtime  metoprolol tartrate Injectable 5 milliGRAM(s) IV Push every 6 hours  nystatin Powder 1 Application(s) Topical <User Schedule>  OMEGAVEN 10 GRAMS/100 ML 56 Gram(s) 560 milliLiter(s) (46.67 mL/Hr) IV Continuous <Continuous>  OMEGAVEN 10 GRAMS/100 ML 56 Gram(s) 56 Gram(s) (46.67 mL/Hr) IV Continuous <Continuous>  pancrelipase  (CREON 24,000 Lipase Units) 1 Capsule(s) Oral three times a day with meals  Parenteral Nutrition - Adult 1 Each (83 mL/Hr) TPN Continuous <Continuous>  ursodiol Capsule 300 milliGRAM(s) Oral every 8 hours  venlafaxine XR. 300 milliGRAM(s) Oral daily    MEDICATIONS  (PRN):  benzocaine/menthol Lozenge 1 Lozenge Oral every 2 hours PRN cough  dextrose Oral Gel 15 Gram(s) Oral once PRN Blood Glucose LESS THAN 70 milliGRAM(s)/deciliter  LORazepam   Injectable 0.25 milliGRAM(s) IV Push daily PRN Anxiety  ondansetron Injectable 4 milliGRAM(s) IV Push every 6 hours PRN Nausea and/or Vomiting      Drips:     ICU Vital Signs Last 24 Hrs  T(C): 36.8 (19 May 2024 08:14), Max: 37.3 (18 May 2024 17:32)  T(F): 98.3 (19 May 2024 08:14), Max: 99.1 (18 May 2024 17:32)  HR: 99 (19 May 2024 09:08) (96 - 101)  BP: 125/58 (19 May 2024 07:00) (93/51 - 127/60)  BP(mean): 83 (19 May 2024 07:00) (67 - 87)  ABP: --  ABP(mean): --  RR: 22 (19 May 2024 08:00) (17 - 48)  SpO2: 98% (19 May 2024 09:08) (95% - 100%)    O2 Parameters below as of 19 May 2024 09:08  Patient On (Oxygen Delivery Method): room air        GENERAL: NAD, resting comfortably in bed, follows commands, AxOx3  HEENT: NCAT, MMM, jaundiced  C/V: Normal rate, normal peripheral perfusion, sinus no murmurs.  PULM: Nonlabored breathing, no respiratory distress, CTA b/l  ABD: Soft, ND, NT, no rebound tenderness, no guarding, wound manager in place with bilious output.   EXTREM: WWP, 2+ pitting edema, SCDs in place  NEURO: No focal deficits        I&O's Summary    18 May 2024 07:01  -  19 May 2024 07:00  --------------------------------------------------------  IN: 3602.4 mL / OUT: 6475 mL / NET: -2872.6 mL    19 May 2024 07:01  -  19 May 2024 10:56  --------------------------------------------------------  IN: 83 mL / OUT: 1300 mL / NET: -1217 mL        LABS:                        8.4    19.22 )-----------( 68       ( 19 May 2024 05:30 )             24.7     05-19    133<L>  |  97  |  67<H>  ----------------------------<  113<H>  3.9   |  27  |  1.67<H>    Ca    8.1<L>      19 May 2024 05:30  Phos  3.6     05-19  Mg     2.0     05-19    TPro  5.4<L>  /  Alb  2.4<L>  /  TBili  7.6<H>  /  DBili  5.1<H>  /  AST  185<H>  /  ALT  47<H>  /  AlkPhos  103  05-19    PT/INR - ( 19 May 2024 05:30 )   PT: 15.8 sec;   INR: 1.40          PTT - ( 19 May 2024 05:30 )  PTT:34.2 sec  Urinalysis Basic - ( 19 May 2024 05:30 )    Color: x / Appearance: x / SG: x / pH: x  Gluc: 113 mg/dL / Ketone: x  / Bili: x / Urobili: x   Blood: x / Protein: x / Nitrite: x   Leuk Esterase: x / RBC: x / WBC x   Sq Epi: x / Non Sq Epi: x / Bacteria: x      CAPILLARY BLOOD GLUCOSE      POCT Blood Glucose.: 126 mg/dL (19 May 2024 06:37)  POCT Blood Glucose.: 131 mg/dL (18 May 2024 23:05)  POCT Blood Glucose.: 118 mg/dL (18 May 2024 17:53)  POCT Blood Glucose.: 135 mg/dL (18 May 2024 12:06)    LIVER FUNCTIONS - ( 19 May 2024 05:30 )  Alb: 2.4 g/dL / Pro: 5.4 g/dL / ALK PHOS: 103 U/L / ALT: 47 U/L / AST: 185 U/L / GGT: x

## 2024-05-19 NOTE — PROGRESS NOTE ADULT - ASSESSMENT
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 Prolonged hospital course significant for recurrent AKIs, cholestatic transaminitis s/p perc cony, prior DVTs, PNAs, fungemia, sinus pauses s/p pacemaker and recurrent bleeding from fistula most recently s/p IR embolization of L branch of inferior epigastric artery via R groin access (4/16), with multiple readmissions for continued bleeding from fistula and ostomy sites. S/p ileoscopy on noting an ulcer w/ clot proximal to stoma w/ hemoclip was applied (5/1). Admitted for symptomatic anemia secondary to bleeding from fistula site and ileostomy sites. Transferred to SICU 5/8 overnight for 2L blood loss from fistula site with subsequent hypotension. Now s/p ileoscopy, noting erythematous patch of bowel just deep to the ECF, without any active bleeding (5/9). S/p IR angiography noting  several areas of hyperemia in the jejunum adjacent to the stoma however no evidence of active bleeding (5/9). Pending transfer to [facility] for further surgical management.     Neuro: Pain: Dilaudid PRN. Hx: Anxiety: Continue Effexor and Ativan PRN. Ambien on hold. Nausea: Zofran PRN.  CV: Hemorrhagic shock on 5/8, 2/2 EC Fistula bleed, resolving. Keep MAPs > 65. Hx AFib: cont Metoprolol 5mg q6, no AC given bleeding risk. Hx HLD: cont Atorvastatin. Total body overload: Will hold Lasix today, Goal I/O is net 0 to neg 1L. MSSA bacteremia: TTE (5/13) no vegetation.   Pulm: Pleural Effusions b/l Rt improved on 5/12 xray. Left pleural effusion continued to be present. Satting low 90's on RA, nightly BIPAP at 40%.   GI: CLD. GI Bleed vs bleeding at fistula site: IR 5/9: segmental hyperemia, unable to embolize c/f bowel ischemia. Ileoscopy 5/9: erythematous patch of bowel just deep to the ECF, without active bleeding. CT 5/2 with bowel wall thickening. T bili max is 9.7, now downtrending. As per Surgical team NTD, no hepatology c/s at this time, US negative for pathology, Cont cholestyramine. C/w Ursodiol. Cont Creon. Crohns: Holding methylprednisolone IV, started hydrocortisone enemas 5/10 as per GI. Continue TPN/ change Lipids to omegaven. Will hold off on remicade in setting of MSSA bacteremia. portal vein thrombus on duplex from 5/13 - unable to give A/C 2* bleed   : Voids. ELVI likely from acute blood loss, resolving, now stable Cr 1.6 given recent diuresis. Baseline 1.2 - 1.4.   ID: Bacteremia 2/2 MSSA grown in BCx (5/9) repeat Bld cx's on 5/11 NGTD. Old PICC line removed. TTE negative for vegetation. WBC improved and afebrile. 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 Pt with hx of + blood cx's with candida glabrata from 11/24/23. Cont Ancef (5/10-6/10), total of 4 week course in setting of hardware as per ID. d/c: // Cefepime (5/10), Vanc x1 (5/9)  Endo: mISSc. Hx: Hypothyroidism. Continue synthroid.   PPx: SCDs, SQH on hold for bleeding.   Heme: Anemia 2/2 EC fistula bleed. Received a total of 12 units of PRBCs, 2 FFP,3 plt, 1 cryo this admission. Trend CBC. 5 of Vit k removed from TPN on 5/14. C/w monitoring plt levels. Intermittent wound bed bleed: Next CBC at noon.  Wounds: Wound manager present with dressing changes done by Wound care team. Bacitracin to rt arm skin tear  Lines: L. PICC (5/17--), Remove RIJ TLC (5/9--), L EJ PIV x1(5/8--), PICC (5/17--).  PT: Ordered OOBTC daily.   Dispo: SICU, possible transfer to [facility] intestinal rehab center today Sunday 5/19

## 2024-05-19 NOTE — PROGRESS NOTE ADULT - ATTENDING COMMENTS
CRS Attending Coverage for Dr Peterson  Seen on AM rounds  Discussed with surgical housestaff  No acute events.  Patient without complaints  Continue ICU care  Pending transfer to [facility]

## 2024-05-20 NOTE — CHART NOTE - NSCHARTNOTEFT_GEN_A_CORE
Brief Nutrition Note:    Chart reviewed, discussed with team. Pt seen with IDT today on 5 EA during AM rounds. TPN remains primary means to nutrition, providing Omegaven lipids daily [good source of Omega 3 polyunsaturated fatty acids, EPA & DHA]. Recommend providing one day of 50g SMOF lipids 500 kcals] to prevent essential fatty acid deficiency, provision would provide ~3.0% total kcal from linoleic acid [recommended >=2% total kcal from linoleic acid per ASPEN]. Consider obtaining cdeadf-as-covearwv ratio, to monitor for essential fatty acid deficiency [deficiency confirmed if triene to tetraene ratio >0.2]. Recommendations discussed with team. RDN to continue to monitor closely & adjust recommendations prn.    Bella Leung MS, RDN, CDN [ext. [phone]]

## 2024-05-20 NOTE — DISCHARGE NOTE NURSING/CASE MANAGEMENT/SOCIAL WORK - PATIENT PORTAL LINK FT
You can access the FollowMyHealth Patient Portal offered by [facility] by registering at the following website: [email or web address]. By joining [facility]’s FollowMyHealth portal, you will also be able to view your health information using other applications (apps) compatible with our system.

## 2024-05-20 NOTE — PROGRESS NOTE ADULT - TIME BILLING
med rec performed  labs reviewed  2 sets of rounds counseled patient
med rec performed  labs reviewed  POCUS d/w nutrition  patient counseled
1
Managing bacteremia
Managing bacteremia
med rec performed  labs reviewed  2 sets of rounds  d/w GI
Managing bacteremia

## 2024-05-20 NOTE — PROGRESS NOTE ADULT - NS ATTEND AMEND GEN_ALL_CORE FT
AF, Crohn's s/p SBR with ileostomy, short bowel, enteroatmospheric fistula with recurrent bleeding from fistula and Crohn's exacerbation, MSSA bacteremia  physical as above, IV sites are clean  transfuse today  continue ancef  platelets are stable after transfusion  PICC in place  omegaven Tuesday-Sunday and SMOF 50 gm on Mondays to avoid essential FA deficiency  dose lasix today  BC today with some increase in WBC
Crohn's, AF, SBR, ileostomy, enteroatmospheric fistula, recurrent acute blood loss anemia, MSSA bacteremia  physical as above  renal stable  continue ancef  replace PICC  continue TPN with omegaven  follow H/H  bilirubin slightly lower
AF, Crohn's, SBR with enteroatmospheric fistula, recurrent acute blood loss anemia now with thrombocytopenia and PV thrombosis  physical as above  continue diuresis; BUN and creatinine are decreasing  dose diamox for metab alkalosis  TPN with omegaven re bilirubin increase which can also be from sepsis and the PV thrombosis  restart ursodiol  continue ancef  platelets appear to be consumed possibly by clot, sepsis, and possibly the ancef

## 2024-05-20 NOTE — PROGRESS NOTE ADULT - SUBJECTIVE AND OBJECTIVE BOX
Bled overnight  Awake and alert this am with no complaints VS stable Afeb Exam stable  Note increasing WBC count

## 2024-05-20 NOTE — PROGRESS NOTE ADULT - REASON FOR ADMISSION
Symptomatic anemia

## 2024-05-20 NOTE — DISCHARGE NOTE NURSING/CASE MANAGEMENT/SOCIAL WORK - NSDCPEFALRISK_GEN_ALL_CORE
For information on Fall & Injury Prevention, visit: [email or web address] OR  [email or web address] OR  https://www.cdc.gov/steadi/patient.html

## 2024-05-20 NOTE — PROGRESS NOTE ADULT - PROVIDER SPECIALTY LIST ADULT
Colorectal Surgery
Colorectal Surgery
Gastroenterology
Internal Medicine
SICU
Surgery
Surgery
Colorectal Surgery
Colorectal Surgery
Gastroenterology
Gastroenterology
Infectious Disease
Internal Medicine
[facility]
SICU
Surgery
Colorectal Surgery
Gastroenterology
Heme/Onc
Heme/Onc
Internal Medicine
SICU
Surgery
Colorectal Surgery
Gastroenterology
Gastroenterology
Heme/Onc
Infectious Disease
Internal Medicine
SICU
SICU
Surgery
Heme/Onc

## 2024-05-20 NOTE — PROGRESS NOTE ADULT - ASSESSMENT
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 Prolonged hospital course significant for recurrent AKIs, cholestatic transaminitis s/p perc cony, prior DVTs, PNAs, fungemia, sinus pauses s/p pacemaker and recurrent bleeding from fistula most recently s/p IR embolization of L branch of inferior epigastric artery via R groin access (4/16), with multiple readmissions for continued bleeding from fistula and ostomy sites. S/p ileoscopy on noting an ulcer w/ clot proximal to stoma w/ hemoclip was applied (5/1). Admitted for symptomatic anemia secondary to bleeding from fistula site and ileostomy sites. Transferred to SICU 5/8 overnight for 2L blood loss from fistula site with subsequent hypotension. Now s/p ileoscopy, noting erythematous patch of bowel just deep to the ECF, without any active bleeding (5/9). S/p IR angiography noting  several areas of hyperemia in the jejunum adjacent to the stoma however no evidence of active bleeding (5/9). Pending transfer to [facility] for further surgical management.     Neuro: Pain: Dilaudid PRN. Hx: Anxiety: Continue Effexor and Ativan PRN. Ambien on hold. Nausea: Zofran PRN.  CV: Hemorrhagic shock on 5/8, due to EC Fistula bleed, resolving. Keep MAPs > 65. Hx AFib: cont Metoprolol 5mg q6, no AC given bleeding risk. Hx HLD: cont Atorvastatin. Total body overload: s/p lasix diuresis, MSSA bacteremia: TTE (5/13) no vegetation.   Pulm: Pleural Effusions b/l Rt improved on 5/12 xray. Left pleural effusion continued to be present. Satting low 90's on RA, nightly BIPAP at 40%.   GI: CLD. GI Bleed vs bleeding at fistula site: IR 5/9: segmental hyperemia, unable to embolize c/f bowel ischemia. Ileoscopy 5/9: erythematous patch of bowel just deep to the ECF, without active bleeding. CT 5/2 with bowel wall thickening. T bili max is 9.7, now downtrending. As per Surgical team NTD, no hepatology c/s at this time, US negative for pathology, Cont cholestyramine. C/w Ursodiol. Cont Creon. Crohns: Holding methylprednisolone IV, started hydrocortisone enemas 5/10 as per GI. Continue TPN/lipids. Will hold off on remicade in setting of MSSA bacteremia. portal vein thrombus on duplex from 5/13 - unable to give A/C due to recurrent bleed   : Voids. ELVI likely from acute blood loss, Cr improved/stabilized. Baseline 1.2 - 1.4.   ID: Bacteremia MSSA grown in BCx (5/9) repeat Bld cx's on 5/11 NGTD. Old PICC line removed. TTE negative for vegetation. persistent leukocytosis, but afebrile. 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 ID following. Pt with hx of + blood cx's with candida glabrata from 11/24/23. Cont Ancef (5/10-6/10), total of 4 week course in setting of hardware as per ID. // PREVIOUS: Cefepime (5/10), Vanc x1 (5/9)  Endo: mISSc. Hx: Hypothyroidism. Continue synthroid.   PPx: SCDs, SQH on hold for bleeding.   Heme: Anemia due to EC fistula bleed. Received multiple PRBCs, FFP, plt, cryo this admission. Heme consulted. Trend CBC. 5 of Vit k removed from TPN on 5/14. C/w monitoring plt levels. Intermittent wound bed bleed  Wounds: Wound manager present with dressing changes done by Wound care team. Bacitracin to rt arm skin tear  Lines: L. PICC (5/17--), Remove RIJ TLC (5/9-5/17), L EJ PIV x1(5/8--),   PT: Ordered OOBTC daily.   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 Prolonged hospital course significant for recurrent AKIs, cholestatic transaminitis s/p perc cony, prior DVTs, PNAs, fungemia, sinus pauses s/p pacemaker and recurrent bleeding from fistula most recently s/p IR embolization of L branch of inferior epigastric artery via R groin access (4/16), with multiple readmissions for continued bleeding from fistula and ostomy sites. S/p ileoscopy on noting an ulcer w/ clot proximal to stoma w/ hemoclip was applied (5/1). Admitted for symptomatic anemia secondary to bleeding from fistula site and ileostomy sites. Transferred to SICU 5/8 overnight for 2L blood loss from fistula site with subsequent hypotension. Now s/p ileoscopy, noting erythematous patch of bowel just deep to the ECF, without any active bleeding (5/9). S/p IR angiography noting  several areas of hyperemia in the jejunum adjacent to the stoma however no evidence of active bleeding (5/9). Pending transfer to [facility] for further surgical management.     Neuro: Pain: Dilaudid PRN. Hx: Anxiety: Continue Effexor and Ativan PRN. Ambien on hold. Nausea: Zofran PRN.  CV: Hemorrhagic shock on 5/8, due to EC Fistula bleed, resolving. maintaining MAPs > 65 without pressors. Hx AFib: cont Metoprolol 5mg q6, no AC given bleeding risk. Hx HLD: cont Atorvastatin. Total body overload: s/p lasix diuresis, lasix been held past few days due to concern for azotemia. BUN/Cr improved, still has significant edema, will re-dose lasix 60 today. MSSA bacteremia: TTE (5/13) no vegetation.   Pulm: Pleural Effusions b/l Rt improved on 5/12 xray. Left pleural effusion continued to be present. no resp distress on Room air, but has some subjective SOB, POCUS today predominantly A-lines,  no significant B lines. nightly BIPAP at 40%.   GI: CLD. GI Bleed vs bleeding at fistula site: IR 5/9: segmental hyperemia, unable to embolize c/f bowel ischemia. Ileoscopy 5/9: erythematous patch of bowel just deep to the ECF, without active bleeding. CT 5/2 with bowel wall thickening. T bili max is 9.7, now downtrending. As per Surgical team NTD, no hepatology c/s at this time, US negative for pathology, Cont cholestyramine. C/w Ursodiol. Cont Creon. Crohns: Holding methylprednisolone IV, started hydrocortisone enemas 5/10 as per GI. Continue TPN, smofflipids on Monday, Omegaven on Tues to Sunday.  Will hold off on remicade in setting of MSSA bacteremia. portal vein thrombus on duplex from 5/13 - unable to give A/C due to recurrent bleed   : Voids. ELVI likely from acute blood loss, Cr improved/stabilized. Baseline 1.2 - 1.4.   ID: Bacteremia MSSA grown in BCx (5/9) repeat Bld cx's on 5/11 NGTD. Old PICC line removed. TTE negative for vegetation. persistent leukocytosis, but afebrile. 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 ID following. Pt with hx of + blood cx's with candida glabrata from 11/24/23. Cont Ancef (5/10-6/10), total of 4 week course in setting of hardware as per ID. // PREVIOUS: Cefepime (5/10), Vanc x1 (5/9)  Endo: mISSc. Hx: Hypothyroidism. Continue synthroid.   PPx: SCDs, SQH on hold for bleeding.   Heme: Anemia due to EC fistula bleed. Received multiple PRBCs, FFP, plt (last plt transfusion was 5/17 night), cryo this admission. Heme consulted. Trend CBC. 5 of Vit k removed from TPN on 5/14. C/w monitoring plt levels. Intermittent wound bed bleed, received 1U PRBC last night and this morning.   Wounds: Wound manager present with dressing changes done by Wound care team. Bacitracin to rt arm skin tear  Lines: L. PICC (5/17--), Remove RIJ TLC (5/9-5/17), L EJ PIV x1(5/8--),   PT: Ordered OOBTC daily.   Dispo: SICU, pending transfer to [facility] intestinal rehab center

## 2024-05-20 NOTE — DISCHARGE NOTE NURSING/CASE MANAGEMENT/SOCIAL WORK - NSDCVIVACCINE_GEN_ALL_CORE_FT
influenza, high-dose, quadrivalent; 23-Jan-2024 11:29; Deandra Mackay (RN); [employer]; [id number] (Exp. Date: 30-Jun-2024); IntraMuscular; Deltoid Left.; 0.7 milliLiter(s); VIS (VIS Published: 06-Aug-2021, VIS Presented: 23-Jan-2024);

## 2024-05-20 NOTE — PROGRESS NOTE ADULT - SUBJECTIVE AND OBJECTIVE BOX
S: had some blood clots from fistula and location of bleeding at wound bed of fistula at around 6 o'clock,     O: ICU Vital Signs Last 24 Hrs  T(F): 98.1 (05-20-24 @ 05:03), Max: 99 (05-19-24 @ 13:00)  HR: 107 (05-20-24 @ 07:00) (96 - 120)  BP: 108/51 (05-20-24 @ 07:00) (96/73 - 124/58)  BP(mean): 69 (05-20-24 @ 07:00) (69 - 84)  ABP: --  RR: 32 (05-20-24 @ 07:00) (18 - 32)  SpO2: 97% (05-20-24 @ 07:00) (95% - 100%)    PHYSICAL EXAM:   Neurological: AAOx3, CNII-XII intact,  strength 5/5 b/l  ENT: mucus membrane moist  Cardiovascular: RRR  Respiratory: CTA  Gastrointestinal: soft, NT, ND, BS+  Extremities: warm, no dependent edema  Vascular: no cyanosis/erythema  Skin: no rashes  MSK: no joint swelling.     LABS:    05-20    132<L>  |  98  |  67<H>  ----------------------------<  133<H>  4.4   |  25  |  1.73<H>    Ca    7.6<L>      20 May 2024 03:13  Phos  4.6     05-20  Mg     2.0     05-20    TPro  5.0<L>  /  Alb  1.9<L>  /  TBili  7.9<H>  /  DBili  5.2<H>  /  AST  212<H>  /  ALT  60<H>  /  AlkPhos  94  05-20  LIVER FUNCTIONS - ( 20 May 2024 03:13 )  Alb: 1.9 g/dL / Pro: 5.0 g/dL / ALK PHOS: 94 U/L / ALT: 60 U/L / AST: 212 U/L / GGT: x                               7.9    20.75 )-----------( 66       ( 20 May 2024 03:13 )             23.9   PT/INR - ( 20 May 2024 03:13 )   PT: 18.3 sec;   INR: 1.63          PTT - ( 20 May 2024 03:13 )  PTT:36.9 sec  Urinalysis Basic - ( 20 May 2024 03:13 )    Color: x / Appearance: x / SG: x / pH: x  Gluc: 133 mg/dL / Ketone: x  / Bili: x / Urobili: x   Blood: x / Protein: x / Nitrite: x   Leuk Esterase: x / RBC: x / WBC x   Sq Epi: x / Non Sq Epi: x / Bacteria: x    CAPILLARY BLOOD GLUCOSE      POCT Blood Glucose.: 122 mg/dL (20 May 2024 05:20)  POCT Blood Glucose.: 160 mg/dL (19 May 2024 23:30)  POCT Blood Glucose.: 95 mg/dL (19 May 2024 18:06)  POCT Blood Glucose.: 125 mg/dL (19 May 2024 11:07)    MEDICATIONS  (STANDING):  albuterol/ipratropium for Nebulization 3 milliLiter(s) Nebulizer every 6 hours  atorvastatin 20 milliGRAM(s) Oral at bedtime  bacitracin   Ointment 1 Application(s) Topical two times a day  ceFAZolin   IVPB 2000 milliGRAM(s) IV Intermittent every 12 hours  chlorhexidine 2% Cloths 1 Application(s) Topical daily  cholestyramine Powder (Sugar-Free) 4 Gram(s) Oral every 24 hours  dextrose 10% Bolus 125 milliLiter(s) IV Bolus once  dextrose 5%. 1000 milliLiter(s) (100 mL/Hr) IV Continuous <Continuous>  dextrose 5%. 1000 milliLiter(s) (50 mL/Hr) IV Continuous <Continuous>  dextrose 50% Injectable 25 Gram(s) IV Push once  dextrose 50% Injectable 12.5 Gram(s) IV Push once  ergocalciferol 68590 Unit(s) Oral <User Schedule>  glucagon  Injectable 1 milliGRAM(s) IntraMuscular once  hydrocortisone Enema 100 milliGRAM(s) Rectal every 12 hours  insulin lispro (ADMELOG) corrective regimen sliding scale   SubCutaneous every 6 hours  levothyroxine Injectable 40 MICROGram(s) IV Push <User Schedule>  melatonin 5 milliGRAM(s) Oral at bedtime  metoprolol tartrate Injectable 5 milliGRAM(s) IV Push every 6 hours  nystatin Powder 1 Application(s) Topical <User Schedule>  OMEGAVEN 10 GRAMS/100 ML VIAL 56 Gram(s) 560 milliLiter(s) (46.67 mL/Hr) IV Continuous <Continuous>  pancrelipase  (CREON 24,000 Lipase Units) 1 Capsule(s) Oral three times a day with meals  Parenteral Nutrition - Adult 1 Each (83 mL/Hr) TPN Continuous <Continuous>  ursodiol Capsule 300 milliGRAM(s) Oral every 8 hours  venlafaxine XR. 300 milliGRAM(s) Oral daily    MEDICATIONS  (PRN):  benzocaine/menthol Lozenge 1 Lozenge Oral every 2 hours PRN cough  dextrose Oral Gel 15 Gram(s) Oral once PRN Blood Glucose LESS THAN 70 milliGRAM(s)/deciliter  LORazepam   Injectable 0.25 milliGRAM(s) IV Push daily PRN Anxiety  ondansetron Injectable 4 milliGRAM(s) IV Push every 6 hours PRN Nausea and/or Vomiting      Poole:	  [ ] None	[ ] Daily Poole Order Placed	   Indication:	  [ ] Strict I and O's    [ ] Obstruction     [ ] Incontinence + Stage 3 or 4 Decubitus  Central Line:  [ ] None	   [ ]  Medication / TPN Administration     [ ] No Peripheral IV        S: had some blood clots from fistula and location of bleeding at wound bed of fistula at around 6 o'clock, controlled with surgicell.   given 1U pRBC    O: ICU Vital Signs Last 24 Hrs  T(F): 98.1 (05-20-24 @ 05:03), Max: 99 (05-19-24 @ 13:00)  HR: 107 (05-20-24 @ 07:00) (96 - 120)  BP: 108/51 (05-20-24 @ 07:00) (96/73 - 124/58)  BP(mean): 69 (05-20-24 @ 07:00) (69 - 84)  ABP: --  RR: 32 (05-20-24 @ 07:00) (18 - 32)  SpO2: 97% (05-20-24 @ 07:00) (95% - 100%)    PHYSICAL EXAM:   Neurological: AAOx3, CNII-XII intact,  strength 5/5 b/l  ENT: mucus membrane moist  Cardiovascular: RRR  Respiratory: CTA  Gastrointestinal: soft, NT, ND, BS+, fistula this morning with greenish bilious drainage, some thin greenish drainage, no blood. ileostomy with yellowish thin liquid drainage.   Extremities: warm, 3+ dependent edema  Vascular: no cyanosis/erythema  Skin: no rashes  MSK: no joint swelling.     LABS:    05-20    132<L>  |  98  |  67<H>  ----------------------------<  133<H>  4.4   |  25  |  1.73<H>    Ca    7.6<L>      20 May 2024 03:13  Phos  4.6     05-20  Mg     2.0     05-20    TPro  5.0<L>  /  Alb  1.9<L>  /  TBili  7.9<H>  /  DBili  5.2<H>  /  AST  212<H>  /  ALT  60<H>  /  AlkPhos  94  05-20  LIVER FUNCTIONS - ( 20 May 2024 03:13 )  Alb: 1.9 g/dL / Pro: 5.0 g/dL / ALK PHOS: 94 U/L / ALT: 60 U/L / AST: 212 U/L / GGT: x                               7.9    20.75 )-----------( 66       ( 20 May 2024 03:13 )             23.9   PT/INR - ( 20 May 2024 03:13 )   PT: 18.3 sec;   INR: 1.63          PTT - ( 20 May 2024 03:13 )  PTT:36.9 sec  Urinalysis Basic - ( 20 May 2024 03:13 )    Color: x / Appearance: x / SG: x / pH: x  Gluc: 133 mg/dL / Ketone: x  / Bili: x / Urobili: x   Blood: x / Protein: x / Nitrite: x   Leuk Esterase: x / RBC: x / WBC x   Sq Epi: x / Non Sq Epi: x / Bacteria: x    CAPILLARY BLOOD GLUCOSE      POCT Blood Glucose.: 122 mg/dL (20 May 2024 05:20)  POCT Blood Glucose.: 160 mg/dL (19 May 2024 23:30)  POCT Blood Glucose.: 95 mg/dL (19 May 2024 18:06)  POCT Blood Glucose.: 125 mg/dL (19 May 2024 11:07)    MEDICATIONS  (STANDING):  albuterol/ipratropium for Nebulization 3 milliLiter(s) Nebulizer every 6 hours  atorvastatin 20 milliGRAM(s) Oral at bedtime  bacitracin   Ointment 1 Application(s) Topical two times a day  ceFAZolin   IVPB 2000 milliGRAM(s) IV Intermittent every 12 hours  chlorhexidine 2% Cloths 1 Application(s) Topical daily  cholestyramine Powder (Sugar-Free) 4 Gram(s) Oral every 24 hours  ergocalciferol 21339 Unit(s) Oral <User Schedule>  glucagon  Injectable 1 milliGRAM(s) IntraMuscular once  hydrocortisone Enema 100 milliGRAM(s) Rectal every 12 hours  insulin lispro (ADMELOG) corrective regimen sliding scale   SubCutaneous every 6 hours  levothyroxine Injectable 40 MICROGram(s) IV Push <User Schedule>  melatonin 5 milliGRAM(s) Oral at bedtime  metoprolol tartrate Injectable 5 milliGRAM(s) IV Push every 6 hours  nystatin Powder 1 Application(s) Topical <User Schedule>  OMEGAVEN 10 GRAMS/100 ML VIAL 56 Gram(s) 560 milliLiter(s) (46.67 mL/Hr) IV Continuous <Continuous>  pancrelipase  (CREON 24,000 Lipase Units) 1 Capsule(s) Oral three times a day with meals  Parenteral Nutrition - Adult 1 Each (83 mL/Hr) TPN Continuous <Continuous>  ursodiol Capsule 300 milliGRAM(s) Oral every 8 hours  venlafaxine XR. 300 milliGRAM(s) Oral daily    MEDICATIONS  (PRN):  benzocaine/menthol Lozenge 1 Lozenge Oral every 2 hours PRN cough  dextrose Oral Gel 15 Gram(s) Oral once PRN Blood Glucose LESS THAN 70 milliGRAM(s)/deciliter  LORazepam   Injectable 0.25 milliGRAM(s) IV Push daily PRN Anxiety  ondansetron Injectable 4 milliGRAM(s) IV Push every 6 hours PRN Nausea and/or Vomiting      Poole:	  [x ] None	[ ] Daily Poole Order Placed	   Indication:	  [ ] Strict I and O's    [ ] Obstruction     [ ] Incontinence + Stage 3 or 4 Decubitus  Central Line:  [ ] None	   [x ]  Medication / TPN Administration     [ ] No Peripheral IV

## 2024-05-29 NOTE — PROGRESS NOTE ADULT - SUBJECTIVE AND OBJECTIVE BOX
OVERNIGHT EVENTS:    SUBJECTIVE / INTERVAL HPI: Patient seen and examined at bedside.     VITAL SIGNS:  Vital Signs Last 24 Hrs  T(C): 36.6 (29 May 2024 10:00), Max: 37.3 (29 May 2024 01:03)  T(F): 97.8 (29 May 2024 10:00), Max: 99.2 (29 May 2024 01:03)  HR: 72 (29 May 2024 08:24) (72 - 100)  BP: 129/60 (29 May 2024 08:24) (129/60 - 145/69)  BP(mean): 84 (29 May 2024 08:24) (84 - 99)  RR: 18 (29 May 2024 08:24) (18 - 18)  SpO2: 99% (29 May 2024 08:24) (94% - 99%)    Parameters below as of 29 May 2024 08:24  Patient On (Oxygen Delivery Method): room air        PHYSICAL EXAM:    General: alert, in no acute distress  HEENT: NC/AT; PERRL, anicteric sclera; MMM  Neck: supple  Cardiovascular: +S1/S2, RRR  Respiratory: CTA B/L; no W/R/R  Gastrointestinal: soft, NT/ND; +BSx4  Extremities: WWP; no edema, clubbing or cyanosis  Vascular: 2+ radial, DP/PT pulses B/L  Neurological: AAOx3; no focal deficits    MEDICATIONS:  MEDICATIONS  (STANDING):  allopurinol 300 milliGRAM(s) Oral every 24 hours  atorvastatin 20 milliGRAM(s) Oral at bedtime  ceFAZolin   IVPB 2000 milliGRAM(s) IV Intermittent every 8 hours  cholestyramine Powder (Sugar-Free) 4 Gram(s) Oral every 24 hours  dextrose 10% Bolus 125 milliLiter(s) IV Bolus once  dextrose 5%. 1000 milliLiter(s) (100 mL/Hr) IV Continuous <Continuous>  dextrose 5%. 1000 milliLiter(s) (50 mL/Hr) IV Continuous <Continuous>  dextrose 50% Injectable 25 Gram(s) IV Push once  dextrose 50% Injectable 12.5 Gram(s) IV Push once  ergocalciferol 57024 Unit(s) Oral <User Schedule>  glucagon  Injectable 1 milliGRAM(s) IntraMuscular once  hydrocortisone Enema 100 milliGRAM(s) Rectal every 12 hours  HYDROmorphone  Injectable 0.25 milliGRAM(s) IV Push once  insulin lispro (ADMELOG) corrective regimen sliding scale   SubCutaneous three times a day before meals  insulin lispro (ADMELOG) corrective regimen sliding scale   SubCutaneous at bedtime  levothyroxine 50 MICROGram(s) Oral every 24 hours  lipid, fat emulsion (Fish Oil and Plant Based) 20% Infusion 0.51 Gm/kG/Day (20.83 mL/Hr) IV Continuous <Continuous>  metoprolol tartrate 50 milliGRAM(s) Oral every 12 hours  pancrelipase  (CREON 24,000 Lipase Units) 1 Capsule(s) Oral three times a day with meals  Parenteral Nutrition - Adult 1 Each (83 mL/Hr) TPN Continuous <Continuous>  ursodiol Capsule 300 milliGRAM(s) Oral every 8 hours  venlafaxine XR. 300 milliGRAM(s) Oral every 24 hours    MEDICATIONS  (PRN):  albuterol/ipratropium for Nebulization 3 milliLiter(s) Nebulizer every 6 hours PRN Shortness of Breath and/or Wheezing  dextrose Oral Gel 15 Gram(s) Oral once PRN Blood Glucose LESS THAN 70 milliGRAM(s)/deciliter  LORazepam   Injectable 0.25 milliGRAM(s) IV Push every 24 hours PRN Anxiety  melatonin 5 milliGRAM(s) Oral at bedtime PRN Insomnia      ALLERGIES:  Allergies    penicillin (Angioedema)    Intolerances        LABS:                        8.0    7.92  )-----------( 97       ( 29 May 2024 05:30 )             25.2     05-29    131<L>  |  100  |  51<H>  ----------------------------<  85  3.8   |  24  |  1.18    Ca    7.9<L>      29 May 2024 05:30  Phos  3.9     05-29  Mg     2.1     05-29    TPro  7.4  /  Alb  2.0<L>  /  TBili  9.1<H>  /  DBili  x   /  AST  365<H>  /  ALT  133<H>  /  AlkPhos  114  05-29    PT/INR - ( 29 May 2024 00:41 )   PT: 17.8 sec;   INR: 1.58          PTT - ( 29 May 2024 00:41 )  PTT:40.3 sec  Urinalysis Basic - ( 29 May 2024 05:30 )    Color: x / Appearance: x / SG: x / pH: x  Gluc: 85 mg/dL / Ketone: x  / Bili: x / Urobili: x   Blood: x / Protein: x / Nitrite: x   Leuk Esterase: x / RBC: x / WBC x   Sq Epi: x / Non Sq Epi: x / Bacteria: x      CAPILLARY BLOOD GLUCOSE      POCT Blood Glucose.: 106 mg/dL (29 May 2024 06:32)      RADIOLOGY & ADDITIONAL TESTS: Reviewed. OVERNIGHT EVENTS: no events     SUBJECTIVE / INTERVAL HPI: Patient seen and examined at bedside.     VITAL SIGNS:  Vital Signs Last 24 Hrs  T(C): 36.6 (29 May 2024 10:00), Max: 37.3 (29 May 2024 01:03)  T(F): 97.8 (29 May 2024 10:00), Max: 99.2 (29 May 2024 01:03)  HR: 72 (29 May 2024 08:24) (72 - 100)  BP: 129/60 (29 May 2024 08:24) (129/60 - 145/69)  BP(mean): 84 (29 May 2024 08:24) (84 - 99)  RR: 18 (29 May 2024 08:24) (18 - 18)  SpO2: 99% (29 May 2024 08:24) (94% - 99%)    Parameters below as of 29 May 2024 08:24  Patient On (Oxygen Delivery Method): room air        PHYSICAL EXAM:    General: alert, in no acute distress  HEENT: NC/AT; PERRL, anicteric sclera; MMM  Neck: supple, no JVP  Cardiovascular: +S1/S2, RRR  Respiratory: CTA B/L; no W/R/R  Gastrointestinal: soft, right sided stoma clear, large left non bleeding fistula with major wound dehiscence with serous discharge   Extremities: WWP; +3 pitting edema, B/L, PICC line in LUE intact   Vascular: 2+ radial, DP/PT pulses B/L  Neurological: AAOx3; no focal deficits    MEDICATIONS:  MEDICATIONS  (STANDING):  allopurinol 300 milliGRAM(s) Oral every 24 hours  atorvastatin 20 milliGRAM(s) Oral at bedtime  ceFAZolin   IVPB 2000 milliGRAM(s) IV Intermittent every 8 hours  cholestyramine Powder (Sugar-Free) 4 Gram(s) Oral every 24 hours  dextrose 10% Bolus 125 milliLiter(s) IV Bolus once  dextrose 5%. 1000 milliLiter(s) (100 mL/Hr) IV Continuous <Continuous>  dextrose 5%. 1000 milliLiter(s) (50 mL/Hr) IV Continuous <Continuous>  dextrose 50% Injectable 25 Gram(s) IV Push once  dextrose 50% Injectable 12.5 Gram(s) IV Push once  ergocalciferol 43450 Unit(s) Oral <User Schedule>  glucagon  Injectable 1 milliGRAM(s) IntraMuscular once  hydrocortisone Enema 100 milliGRAM(s) Rectal every 12 hours  HYDROmorphone  Injectable 0.25 milliGRAM(s) IV Push once  insulin lispro (ADMELOG) corrective regimen sliding scale   SubCutaneous three times a day before meals  insulin lispro (ADMELOG) corrective regimen sliding scale   SubCutaneous at bedtime  levothyroxine 50 MICROGram(s) Oral every 24 hours  lipid, fat emulsion (Fish Oil and Plant Based) 20% Infusion 0.51 Gm/kG/Day (20.83 mL/Hr) IV Continuous <Continuous>  metoprolol tartrate 50 milliGRAM(s) Oral every 12 hours  pancrelipase  (CREON 24,000 Lipase Units) 1 Capsule(s) Oral three times a day with meals  Parenteral Nutrition - Adult 1 Each (83 mL/Hr) TPN Continuous <Continuous>  ursodiol Capsule 300 milliGRAM(s) Oral every 8 hours  venlafaxine XR. 300 milliGRAM(s) Oral every 24 hours    MEDICATIONS  (PRN):  albuterol/ipratropium for Nebulization 3 milliLiter(s) Nebulizer every 6 hours PRN Shortness of Breath and/or Wheezing  dextrose Oral Gel 15 Gram(s) Oral once PRN Blood Glucose LESS THAN 70 milliGRAM(s)/deciliter  LORazepam   Injectable 0.25 milliGRAM(s) IV Push every 24 hours PRN Anxiety  melatonin 5 milliGRAM(s) Oral at bedtime PRN Insomnia      ALLERGIES:  Allergies    penicillin (Angioedema)    Intolerances        LABS:                        8.0    7.92  )-----------( 97       ( 29 May 2024 05:30 )             25.2     05-29    131<L>  |  100  |  51<H>  ----------------------------<  85  3.8   |  24  |  1.18    Ca    7.9<L>      29 May 2024 05:30  Phos  3.9     05-29  Mg     2.1     05-29    TPro  7.4  /  Alb  2.0<L>  /  TBili  9.1<H>  /  DBili  x   /  AST  365<H>  /  ALT  133<H>  /  AlkPhos  114  05-29    PT/INR - ( 29 May 2024 00:41 )   PT: 17.8 sec;   INR: 1.58          PTT - ( 29 May 2024 00:41 )  PTT:40.3 sec  Urinalysis Basic - ( 29 May 2024 05:30 )    Color: x / Appearance: x / SG: x / pH: x  Gluc: 85 mg/dL / Ketone: x  / Bili: x / Urobili: x   Blood: x / Protein: x / Nitrite: x   Leuk Esterase: x / RBC: x / WBC x   Sq Epi: x / Non Sq Epi: x / Bacteria: x      CAPILLARY BLOOD GLUCOSE      POCT Blood Glucose.: 106 mg/dL (29 May 2024 06:32)      RADIOLOGY & ADDITIONAL TESTS: Reviewed.

## 2024-05-29 NOTE — H&P ADULT - PROBLEM SELECTOR PLAN 10
F: TPN with luca  E: replete as needed  N: regular/TPN  VTE Prophylaxis: SCDs   Activity: ambulate as tolerated  D: 7L   FULL CODE.

## 2024-05-29 NOTE — DIETITIAN INITIAL EVALUATION ADULT - PROBLEM SELECTOR PLAN 5
Diagnosis of AFib/Flutter s/p DCCVs.   Home medication: lopressor 50 mg QD PO  - c/w lopressor 50 bid

## 2024-05-29 NOTE — PROGRESS NOTE ADULT - ASSESSMENT
78M with PMHx of AFib/Flutter, Crohns SBO s/p open TRE and SBR c/b abdominal wall dehiscence and development of enteroatmospheric fistula, recurrent AKIs, cholestatic transaminitis s/p perc cony, prior DVTs, PNAs, fungemia, sinus pauses s/p pacemaker and recurrent bleeding from fistula, recently transferred to [place] for further surgical care, now transferred back to 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 for admitted to telemetry for further management.

## 2024-05-29 NOTE — H&P ADULT - NSHPPOADEEPVENOUSTHROMB_GEN_A_CORE
Spoke to Agustina all questions answered regarding positions for tag and surgery were answered.  She verbalized understanding and was appreciative of conversation. Encouraged to call with any other questions as they arise.    
no

## 2024-05-29 NOTE — PROGRESS NOTE ADULT - PROBLEM SELECTOR PLAN 4
Anemia 2/2 EC fistula bleed. Last admission received 12u PRBCs. Hgb on admission 7.9.  - F/u surgery recs for management of EC bleed  - Maintain active T&S   - Transfuse <7 Anemia 2/2 EC fistula bleed. Last admission received 12u PRBCs. Hgb on admission 7.9.  - F/u surgery recs for management of EC bleed  - Maintain active T&S, last T&S 5/29   - Transfuse <7 Enbrel Counseling:  I discussed with the patient the risks of etanercept including but not limited to myelosuppression, immunosuppression, autoimmune hepatitis, demyelinating diseases, lymphoma, and infections.  The patient understands that monitoring is required including a PPD at baseline and must alert us or the primary physician if symptoms of infection or other concerning signs are noted.

## 2024-05-29 NOTE — CONSULT NOTE ADULT - ASSESSMENT
78 year old male with past medical history of AFib/Flutter with numerous DCCVs in the past with prolonged admission last year for Crohns SBO s/p open TRE and SBR c/b abdominal wall dehiscence and development of enteroatmospheric fistula; prolonged hospital course significant for recurrent AKIs, cholestatic transaminitis s/p perc cony, prior DVTs, PNAs, fungemia, sinus pauses s/p pacemaker and recurrent bleeding from fistula most recently s/p IR embolization of L branch of inferior epigastric artery via R groin access (4/16), with multiple readmissions for continued bleeding from fistula and ostomy sites s/p ileoscopy on noting an ulcer w/ clot proximal to stoma w/ hemoclip was applied (5/1), re-admitted (5/5/24) for symptomatic anemia secondary to bleeding from fistula site and ileostomy sites s/p ileoscopy, noting erythematous patch of bowel just deep to the ECF, without any active bleeding (5/9), s/p IR angiography noting  several areas of hyperemia in the jejunum adjacent to the stoma however no evidence of active bleeding (5/9) transferred to [facility] for definitive operative management however canceled due to portal hypertension now transferred to [facility] for possible TIPS procedure. Afebrile, , normotensive, O2 sat 98% on RA. On exam, abdomen is soft, nondistended, nontender; RUQ stoma involuted with thin serous output without stigmata of bleeding; LUQ fistula with bilious drainage and large old clot, removed with persistent ooze from 6 o'clock position, packed with Surgicel powder and sheets. Labs show WBC 7.65, H/H 7.9/24.1, coags wnl.    Patient currently not a candidate for operative intervention given portal hypertension  f/u IR recs in AM  Trend CBC q6h  Transfuse hgb <7  If evidence of re-bleeding, please reach out to general surgery team 5C  General surgery team 5C will continue to follow closely

## 2024-05-29 NOTE — H&P ADULT - NSHPPHYSICALEXAM_GEN_ALL_CORE
.  VITAL SIGNS:  T(C): --  T(F): --  HR: --  BP: --  BP(mean): --  RR: --  SpO2: --  Wt(kg): --    PHYSICAL EXAM:    Constitutional: WDWN resting comfortably in bed; NAD  Head: NC/AT  Eyes: PERRL, EOMI, anicteric sclera  ENT: no nasal discharge; uvula midline, no oropharyngeal erythema or exudates; MMM  Neck: supple; no JVD or thyromegaly  Respiratory: CTA B/L; no W/R/R, no retractions  Cardiac: +S1/S2; RRR; no M/R/G; PMI non-displaced  Gastrointestinal: abdomen soft, NT/ND; no rebound or guarding; +BSx4  Back: spine midline, no bony tenderness or step-offs; no CVAT B/L  Extremities: WWP, no clubbing or cyanosis; no peripheral edema  Musculoskeletal: NROM x4; no joint swelling, tenderness or erythema  Vascular: 2+ radial, femoral, DP/PT pulses B/L  Dermatologic: skin warm, dry and intact; no rashes, wounds, or scars  Lymphatic: no submandibular or cervical LAD  Neurologic: AAOx3; CNII-XII grossly intact; no focal deficits  Psychiatric: affect and characteristics of appearance, verbalizations, behaviors are appropriate .  VITAL SIGNS:  T(C): --  T(F): --  HR: --  BP: --  BP(mean): --  RR: --  SpO2: --  Wt(kg): --    PHYSICAL EXAM:    GENERAL: NAD, resting comfortably in bed, AxOx3  HEENT: NCAT, MMM, jaundiced  C/V: Normal rate, normal peripheral perfusion, sinus no murmurs.  PULM: Nonlabored breathing, no respiratory distress, CTA b/l  ABD: Soft, ND, NT, no rebound tenderness, no guarding, wound manager in place with bilious output.   EXTREM: WWP, 2+ pitting edema, SCDs in place  NEURO: No focal deficits .  VITAL SIGNS:  T(C): 36.4 (05-29-24 @ 05:32), Max: 37.3 (05-29-24 @ 01:03)  T(F): 97.5 (05-29-24 @ 05:32), Max: 99.2 (05-29-24 @ 01:03)  HR: 96 (05-29-24 @ 04:14) (96 - 100)  BP: 145/69 (05-29-24 @ 04:14) (132/62 - 145/69)  BP(mean): 99 (05-29-24 @ 04:14) (89 - 99)  RR: 18 (05-29-24 @ 04:14) (18 - 18)  SpO2: 94% (05-29-24 @ 04:14) (94% - 98%)  Wt(kg): --    PHYSICAL EXAM:    Constitutional: WDWN resting comfortably in bed; NAD  Head: NC/AT  Eyes: PERRL, EOMI, anicteric sclera  ENT: no nasal discharge; uvula midline, no oropharyngeal erythema or exudates; MMM  Neck: supple; no JVD or thyromegaly  Respiratory: CTA B/L; no W/R/R, no retractions  Cardiac: +S1/S2; RRR; no M/R/G; PMI non-displaced  Gastrointestinal: abdomen soft, NT/ND; no rebound or guarding; +BSx4  Back: spine midline, no bony tenderness or step-offs; no CVAT B/L  Extremities: WWP, no clubbing or cyanosis; no peripheral edema  Musculoskeletal: NROM x4; no joint swelling, tenderness or erythema  Vascular: 2+ radial, femoral, DP/PT pulses B/L  Dermatologic: skin warm, dry and intact; no rashes, wounds, or scars  Lymphatic: no submandibular or cervical LAD  Neurologic: AAOx3; CNII-XII grossly intact; no focal deficits  Psychiatric: affect and characteristics of appearance, verbalizations, behaviors are appropriate  PHYSICAL EXAM:    GENERAL: NAD, resting comfortably in bed, AxOx3  HEENT: NCAT, MMM, jaundiced  C/V: Normal rate, normal peripheral perfusion, sinus no murmurs.  PULM: Nonlabored breathing, no respiratory distress, CTA b/l  ABD: Soft, ND, NT, no rebound tenderness, no guarding, wound manager in place with bilious output.   EXTREM: WWP, 2+ pitting edema, SCDs in place  NEURO: No focal deficits .  VITAL SIGNS:  T(C): 36.4 (05-29-24 @ 05:32), Max: 37.3 (05-29-24 @ 01:03)  T(F): 97.5 (05-29-24 @ 05:32), Max: 99.2 (05-29-24 @ 01:03)  HR: 96 (05-29-24 @ 04:14) (96 - 100)  BP: 145/69 (05-29-24 @ 04:14) (132/62 - 145/69)  BP(mean): 99 (05-29-24 @ 04:14) (89 - 99)  RR: 18 (05-29-24 @ 04:14) (18 - 18)  SpO2: 94% (05-29-24 @ 04:14) (94% - 98%)  Wt(kg): --    PHYSICAL EXAM:    GENERAL: NAD, resting comfortably in bed, AxOx3  HEENT: NCAT, MMM, jaundiced  C/V: Normal rate, normal peripheral perfusion, sinus no murmurs.  PULM: Nonlabored breathing, no respiratory distress, CTA b/l  ABD: Soft, ND, NT, no rebound tenderness, no guarding, wound manager in place with bilious output.   EXTREM: WWP, 2+ pitting edema, SCDs in place  NEURO: No focal deficits .  VITAL SIGNS:  T(C): 36.4 (05-29-24 @ 05:32), Max: 37.3 (05-29-24 @ 01:03)  T(F): 97.5 (05-29-24 @ 05:32), Max: 99.2 (05-29-24 @ 01:03)  HR: 96 (05-29-24 @ 04:14) (96 - 100)  BP: 145/69 (05-29-24 @ 04:14) (132/62 - 145/69)  BP(mean): 99 (05-29-24 @ 04:14) (89 - 99)  RR: 18 (05-29-24 @ 04:14) (18 - 18)  SpO2: 94% (05-29-24 @ 04:14) (94% - 98%)  Wt(kg): --    PHYSICAL EXAM:    GENERAL: NAD, resting comfortably in bed, AxOx3  HEENT: NCAT, MMM, jaundiced  C/V: Normal rate, normal peripheral perfusion, sinus no murmurs.  PULM: Nonlabored breathing, no respiratory distress, CTA b/l  ABD: Soft, ND, NT, no rebound tenderness, no guarding, RUQ stoma w/o bleeding, LUQ fistula with blood clots and bile.   EXTREM: WWP  NEURO: No focal deficits

## 2024-05-29 NOTE — H&P ADULT - PROBLEM SELECTOR PLAN 5
Diagnosis of AFib/Flutter s/p DCCVs.   Home medication: lopressor 50 mg QD PO Diagnosis of AFib/Flutter s/p DCCVs.   Home medication: lopressor 50 mg QD PO  - c/w lopressor 50 bid

## 2024-05-29 NOTE — H&P ADULT - PROBLEM SELECTOR PLAN 9
Home med: venlafaxine 150 mg 2 tabs QD. Ativan 1mg qd PRN for anxiety  - C/w home meds    #Gout  Last discharged on allopurinol 300 qd  - C/w home meds    # Home med: venlafaxine 150 mg 2 tabs QD. Ativan 1mg qd PRN for anxiety  - C/w home meds    #Gout  Last discharged on allopurinol 300 qd  - C/w home meds

## 2024-05-29 NOTE — PROGRESS NOTE ADULT - ATTENDING COMMENTS
Crohn's, AF, s/p SBR with ileocecectomy, ileostomy, enteroatmospheric fistula, MSSA bacteremia, portal HTN, short gut syndrome  physical as above  continue TPN  continue ancef  metoprolol changed to carvedilol  trial of octreotide  wound care  hepatology f/u

## 2024-05-29 NOTE — PATIENT PROFILE ADULT - FUNCTIONAL ASSESSMENT - DAILY ACTIVITY 4.
Cataract    Cervical Stenosis of Spine    CKD (chronic kidney disease)    Deep Vein Thrombosis (DVT)  Left leg, 2004, treated and resolved  Diabetes Mellitus Type II    Dyslipidemia    Edema of lower extremity  on lasix  Endometrial Hyperplasia    HTN (Hypertension)    Insomnia    Morbid Obesity    Narrow angle glaucoma of left eye    Other fecal abnormalities  +iFOBT  Spinal Stenosis, Lumbar    Vitamin D deficiency 2 = A lot of assistance

## 2024-05-29 NOTE — DIETITIAN INITIAL EVALUATION ADULT - ADD RECOMMEND
-Continue parenteral nutrition support as primary source of nutrition via PICC    *Recommend continue 370g dextrose, 136g amino acids, and 50g SMOF (M) or 56g Omegaven (T, W, Th, F, Sa, Garcia).    *Consider goal regimen of 373g dextrose, 167g amino acids, and 85g Omegaven (655 calories, 25% total calories) to provide 2591 calories and 167 gProt. with GIR of 2.62 mg/kg/min. This is 23 nonprotein calories (31 calories/kg) and 2.0 gProt. per kg ideal body weight 83.6 kg.     >Continue 24hr infusion as pt with limited improvement cyclic parenteral nutrition support     >Continue SMOF lipids x1/weeks to avoid EFAD      -Consider triene:tetraene test     >Consider UUN and/or fecal fat test to assess absorption and protein utilization   -Daily wts, BMP with Mg/Phos, I/Os, and BG checks q6hr or as needed    *Continue Cu, Zn, and Se MWF in TPN  -Weekly TGs 6hr post-transfusion   -Continue clear liquid diet; advance diet as tolerated    *Goal for low fiber diet with Ottoniel BID   -Monitor skin integrity; wound care RN following   -Monitor GI function and ostomy/fistula output   -Align nutrition with goals of care at all times

## 2024-05-29 NOTE — H&P ADULT - PROBLEM SELECTOR PLAN 8
Home med: crestor 5mg qd.   - C/w therapeutic conversion lipitor 20mg qhs  - f/up repeat TG level given TPN.

## 2024-05-29 NOTE — PROGRESS NOTE ADULT - PROBLEM SELECTOR PLAN 5
Diagnosis of AFib/Flutter s/p DCCVs.   Home medication: lopressor 50 mg QD PO  - c/w lopressor 50 bid Diagnosis of AFib/Flutter s/p DCCVs.   Home medication: lopressor 50 mg QD PO  - discontinued lopressor, started coreg 3.125mg BID for management of Afib and portal HTN

## 2024-05-29 NOTE — PROGRESS NOTE ADULT - PROBLEM SELECTOR PLAN 2
Pt was transferred to [facility] for further care of Crohn's with planned bowel surgery that was held iso new portal hypertension, now transferred back for planned TIPS via IR (with Dr. Zhao)  - No intervention at this time Pt was transferred to [facility] for further care of Crohn's with planned bowel surgery that was held iso new portal hypertension, now transferred back to [facility] for management of portal hypertension.   - Hepatology consulted, recommended discontinuing lopressor and starting coreg 3.125 BID Pt was transferred to [facility] for further care of Crohn's with planned bowel surgery that was held iso new portal hypertension, now transferred back to [facility] for management of portal hypertension.   - Hepatology consulted, recommended discontinuing lopressor and starting coreg 3.125 BID, starting octreotide gtt, and may consider IR embolization with Dr. Zhao

## 2024-05-29 NOTE — DIETITIAN INITIAL EVALUATION ADULT - PROBLEM SELECTOR PROBLEM 4
Pt. is a 58 yo female that had an incidental finding of a right kidney mass and cholelithiasis.
Anemia

## 2024-05-29 NOTE — CONSULT NOTE ADULT - ASSESSMENT
78M with PMH of a fib/flutter with numerous DCCVs in the past with prolonged admission last year for Crohn's SBO (s/p open TRE and SBR c/b abdominal wall dehiscence and development of enteroatmospheric fistula), now presenting after recent admission from [place], with bleeding from fistula site and a new diagnosis of portal hypertension based off of liver biopsy done at [place] (wedge pressure of 17). Hepatology consulted for bleeding from ostomy and further management of portal hypertension.     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 Zhao) to determine if an intervention could be possible be performed on an ectopic varix (sclerosis, embolization)   - trend CBC, CMP, and INR daily     Case discussed with Dr. Slaughter. Hepatology Team will continue to follow.     Esme Barber D.O.   Gastroenterology Fellow  Weekday 7am-5pm Pager: [phone]    Follow Up Questions welcome via [facility] Microsoft Teams Messenger

## 2024-05-29 NOTE — DIETITIAN INITIAL EVALUATION ADULT - PERTINENT MEDS FT
MEDICATIONS  (STANDING):  allopurinol 300 milliGRAM(s) Oral every 24 hours  atorvastatin 20 milliGRAM(s) Oral at bedtime  ceFAZolin   IVPB 2000 milliGRAM(s) IV Intermittent every 8 hours  cholestyramine Powder (Sugar-Free) 4 Gram(s) Oral every 24 hours  dextrose 10% Bolus 125 milliLiter(s) IV Bolus once  dextrose 5%. 1000 milliLiter(s) (100 mL/Hr) IV Continuous <Continuous>  dextrose 5%. 1000 milliLiter(s) (50 mL/Hr) IV Continuous <Continuous>  dextrose 50% Injectable 25 Gram(s) IV Push once  dextrose 50% Injectable 12.5 Gram(s) IV Push once  ergocalciferol 07263 Unit(s) Oral <User Schedule>  glucagon  Injectable 1 milliGRAM(s) IntraMuscular once  hydrocortisone Enema 100 milliGRAM(s) Rectal every 12 hours  insulin lispro (ADMELOG) corrective regimen sliding scale   SubCutaneous three times a day before meals  insulin lispro (ADMELOG) corrective regimen sliding scale   SubCutaneous at bedtime  levothyroxine 50 MICROGram(s) Oral every 24 hours  lipid, fat emulsion (Fish Oil and Plant Based) 20% Infusion 0.51 Gm/kG/Day (20.83 mL/Hr) IV Continuous <Continuous>  metoprolol tartrate 50 milliGRAM(s) Oral every 12 hours  pancrelipase  (CREON 24,000 Lipase Units) 1 Capsule(s) Oral three times a day with meals  Parenteral Nutrition - Adult 1 Each (83 mL/Hr) TPN Continuous <Continuous>  ursodiol Capsule 300 milliGRAM(s) Oral every 8 hours  venlafaxine XR. 300 milliGRAM(s) Oral every 24 hours    MEDICATIONS  (PRN):  albuterol/ipratropium for Nebulization 3 milliLiter(s) Nebulizer every 6 hours PRN Shortness of Breath and/or Wheezing  dextrose Oral Gel 15 Gram(s) Oral once PRN Blood Glucose LESS THAN 70 milliGRAM(s)/deciliter  LORazepam   Injectable 0.25 milliGRAM(s) IV Push every 24 hours PRN Anxiety  melatonin 5 milliGRAM(s) Oral at bedtime PRN Insomnia  
n/a

## 2024-05-29 NOTE — PROGRESS NOTE ADULT - PROBLEM SELECTOR PLAN 9
Home med: venlafaxine 150 mg 2 tabs QD. Ativan 0.25 mg qd PRN for anxiety  - C/w home meds    #Gout  Last discharged on allopurinol 300 qd  - C/w home meds Home med: venlafaxine 150 mg 2 tabs QD. Ativan 0.25 mg qd PRN for anxiety  - C/w home meds    #Gout  Last discharged on allopurinol 300 qd  - C/w home meds    #Vit D deficiency  Patient previously on ergocalciferol 50,000 qweekly  - Vit D 25 hydroxy <6, resumed ergocalciferol 50,000 qweekly

## 2024-05-29 NOTE — DIETITIAN INITIAL EVALUATION ADULT - PROBLEM SELECTOR PLAN 3
Pt with multiple surgery, s/p ostomy placement. Patient having bleeding from ostomy site. Surgery following for intervention.   - C/w home creon 24,000U TID w meals   - C/w ostomy bag care by personal wound care.  - C/w hydrocortisone enema     #Cholelithiasis  - C/w ursodiol 300mg q8h  - C/w cholestyramine 4g qd

## 2024-05-29 NOTE — PROGRESS NOTE ADULT - PROBLEM SELECTOR PLAN 8
Home med: crestor 5mg qd.   - C/w therapeutic conversion lipitor 20mg qhs  - f/up repeat TG level given TPN. Home med: crestor 5mg qd.   - C/w therapeutic conversion lipitor 20mg qhs  - , continue to monitor TG level given TPN

## 2024-05-29 NOTE — PATIENT PROFILE ADULT - FALL HARM RISK - RISK INTERVENTIONS

## 2024-05-29 NOTE — PROGRESS NOTE ADULT - PROBLEM SELECTOR PLAN 3
Pt with multiple surgery, s/p ostomy placement. Patient having bleeding from ostomy site. Surgery following for intervention.   - C/w home creon 24,000U TID w meals   - C/w ostomy bag care by personal wound care.  - C/w hydrocortisone enema     #Cholelithiasis  - C/w ursodiol 300mg q8h  - C/w cholestyramine 4g qd s/p ileocecectomy, s/p right –sided ostomy placement. Also with enteroatmospheric fistula, identified as frequent source of bleeding. Surgery following  - c/w home creon 24,000U TID w meals   - c/w ostomy bag care by personal wound care.  - c/w hydrocortisone enema   - f/u surgery recs    #Cholelithiasis  - C/w ursodiol 300mg q8h  - C/w cholestyramine 4g qd

## 2024-05-29 NOTE — DIETITIAN INITIAL EVALUATION ADULT - PERTINENT LABORATORY DATA
05-29    131<L>  |  100  |  51<H>  ----------------------------<  85  3.8   |  24  |  1.18    Ca    7.9<L>      29 May 2024 05:30  Phos  3.9     05-29  Mg     2.1     05-29    TPro  7.4  /  Alb  2.0<L>  /  TBili  9.1<H>  /  DBili  x   /  AST  365<H>  /  ALT  133<H>  /  AlkPhos  114  05-29  POCT Blood Glucose.: 99 mg/dL (05-29-24 @ 12:30)  A1C with Estimated Average Glucose Result: 5.3 % (05-29-24 @ 00:41)  A1C with Estimated Average Glucose Result: <4.2 % (04-17-24 @ 05:30)  A1C with Estimated Average Glucose Result: 5.1 % (09-11-23 @ 04:47)

## 2024-05-29 NOTE — CONSULT NOTE ADULT - SUBJECTIVE AND OBJECTIVE BOX
Initial Hepatology Consult Note:     HPI:  78M with PMHx of AFib/Flutter 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 Prolonged hospital course significant for recurrent AKIs, cholestatic transaminitis s/p perc cony, prior DVTs, PNAs, fungemia, sinus pauses s/p pacemaker and recurrent bleeding from fistula recently s/p IR embolization of L branch of inferior epigastric artery via R groin access (4/16), with mutiple presentations afterwards for recurrent bleeding at fistula site requiring transfusions. Most recently admitted 5/5/24 for BRB from ECF requiring multiple transfusions, was transferred to SICU, where bcx returned positive for MSSA managed with Cefazolin 2g (5/10-6/10), was transferred to [place] [facility] for further surgical care, however found to have elevated portal HTN, now transferred back to [facility] admitted to telemetry.     Patient seen at bedside, found laying comfortably in bed, NAD. He says he feels well overall and does not have any acute complaints. Suction container with ~400cc of blood that was suctioned via nursing earlier. Per private nurse Bernice, pt did not have any bloody output from stoma while at [facility].  (29 May 2024 00:27)    Allergies  penicillin (Angioedema)  Intolerances      Home Medications:  allopurinol 300 mg oral tablet: 1 tab(s) orally once a day (16 Apr 2024 10:54)  bacitracin 500 units/g topical ointment: 1 Apply topically to affected area 2 times a day (20 May 2024 12:44)  ceFAZolin 2 g intravenous injection: 2 gram(s) intravenous every 12 hours Please continue with 1 month course until 6/10 for MSSA bacteremia. (29 May 2024 02:43)  cholestyramine 4 g/9 g oral powder for reconstitution: 4,000 milligram(s) orally once a day Please give 4 grams of cholestyramine PO daily (20 May 2024 12:44)  hydrocortisone 100 mg/60 mL rectal suspension: 60 milliliter(s) rectal every 12 hours (20 May 2024 12:44)  ipratropium-albuterol 0.5 mg-2.5 mg/3 mL inhalation solution: 3 milliliter(s) inhaled every 6 hours (20 May 2024 12:44)  levothyroxine 40 mcg (0.04 mg)/mL intravenous solution: 40 microgram(s) intravenous once a day (20 May 2024 13:14)  LORazepam 1 mg/mL-NaCl 0.9% intravenous solution: 0.25 milliliter(s) intravenous once a day As needed Anxiety (20 May 2024 12:44)  melatonin 5 mg oral tablet: 1 tab(s) orally once a day (at bedtime) (20 May 2024 12:44)  nystatin 100,000 units/g topical powder: 1 Apply topically to affected area (20 May 2024 12:44)  ondansetron 2 mg/mL injectable solution: 4 milligram(s) injectable 4 times a day as needed for  nausea (20 May 2024 12:44)  rosuvastatin 5 mg oral tablet: 1 tab(s) orally once a day (16 Apr 2024 10:56)  ursodiol 300 mg oral capsule: 1 cap(s) orally every 8 hours (20 May 2024 12:44)  venlafaxine 150 mg oral capsule, extended release: 2 cap(s) orally once a day (20 May 2024 12:44)    MEDICATIONS:  MEDICATIONS  (STANDING):  allopurinol 300 milliGRAM(s) Oral every 24 hours  atorvastatin 20 milliGRAM(s) Oral at bedtime  ceFAZolin   IVPB 2000 milliGRAM(s) IV Intermittent every 8 hours  cholestyramine Powder (Sugar-Free) 4 Gram(s) Oral every 24 hours  dextrose 10% Bolus 125 milliLiter(s) IV Bolus once  dextrose 5%. 1000 milliLiter(s) (100 mL/Hr) IV Continuous <Continuous>  dextrose 5%. 1000 milliLiter(s) (50 mL/Hr) IV Continuous <Continuous>  dextrose 50% Injectable 25 Gram(s) IV Push once  dextrose 50% Injectable 12.5 Gram(s) IV Push once  ergocalciferol 28927 Unit(s) Oral <User Schedule>  glucagon  Injectable 1 milliGRAM(s) IntraMuscular once  hydrocortisone Enema 100 milliGRAM(s) Rectal every 12 hours  insulin lispro (ADMELOG) corrective regimen sliding scale   SubCutaneous three times a day before meals  insulin lispro (ADMELOG) corrective regimen sliding scale   SubCutaneous at bedtime  levothyroxine 50 MICROGram(s) Oral every 24 hours  lipid, fat emulsion (Fish Oil and Plant Based) 20% Infusion 0.51 Gm/kG/Day (20.83 mL/Hr) IV Continuous <Continuous>  metoprolol tartrate 50 milliGRAM(s) Oral every 12 hours  pancrelipase  (CREON 24,000 Lipase Units) 1 Capsule(s) Oral three times a day with meals  Parenteral Nutrition - Adult 1 Each (83 mL/Hr) TPN Continuous <Continuous>  ursodiol Capsule 300 milliGRAM(s) Oral every 8 hours  venlafaxine XR. 300 milliGRAM(s) Oral every 24 hours    MEDICATIONS  (PRN):  albuterol/ipratropium for Nebulization 3 milliLiter(s) Nebulizer every 6 hours PRN Shortness of Breath and/or Wheezing  dextrose Oral Gel 15 Gram(s) Oral once PRN Blood Glucose LESS THAN 70 milliGRAM(s)/deciliter  LORazepam   Injectable 0.25 milliGRAM(s) IV Push every 24 hours PRN Anxiety  melatonin 5 milliGRAM(s) Oral at bedtime PRN Insomnia    PAST MEDICAL & SURGICAL HISTORY:  Essential hypertension  Hypertension      Atrial fibrillation  s/p cardioversion 2010 and 2014  Pt. reports 4 DCCV  Now on Amiodarone 200mg PO bid and Eliquis 5mg PO bid  Last DCCV 4 yrs ago at [facility]      Crohn's disease  s/p partial resection of ileum      Hyperlipidemia      Hypothyroidism      History of depression  On Venlafaxine ER 150mg PO bid      Junctional rhythm      Bradycardia      H/O knee surgery      History of cataract surgery      S/P appendectomy        FAMILY HISTORY:    SOCIAL HISTORY:  Tobacoo: [ ] Current, [ ] Former, [ ] Never; Pack Years:  Alcohol:  Illicit Drugs:    REVIEW OF SYSTEMS:  CONSTITUTIONAL: No weakness, fevers or chills  HEENT: No visual changes; No vertigo or throat pain   NECK: No pain or stiffness  RESPIRATORY: No cough, wheezing, hemoptysis; No shortness of breath  CARDIOVASCULAR: No chest pain or palpitations  GASTROINTESTINAL: No abdominal or epigastric pain. No nausea, vomiting, or hematemesis; No diarrhea or constipation. No melena or hematochezia.  GENITOURINARY: No dysuria, frequency or hematuria  NEUROLOGICAL: No numbness or weakness  SKIN: No itching, burning, rashes, or lesions   All other 10 review of systems is negative unless indicated above.    Vital Signs Last 24 Hrs  T(C): 36.6 (29 May 2024 18:07), Max: 37.3 (29 May 2024 01:03)  T(F): 97.8 (29 May 2024 18:07), Max: 99.2 (29 May 2024 01:03)  HR: 96 (29 May 2024 16:42) (72 - 100)  BP: 113/73 (29 May 2024 16:42) (102/55 - 145/69)  BP(mean): 86 (29 May 2024 16:42) (75 - 99)  RR: 18 (29 May 2024 16:42) (18 - 18)  SpO2: 99% (29 May 2024 16:42) (94% - 99%)    Parameters below as of 29 May 2024 16:42  Patient On (Oxygen Delivery Method): room air        05-28 @ 07:01  -  05-29 @ 07:00  --------------------------------------------------------  IN: 0 mL / OUT: 1800 mL / NET: -1800 mL    05-29 @ 07:01  -  05-29 @ 20:12  --------------------------------------------------------  IN: 0 mL / OUT: 3550 mL / NET: -3550 mL        PHYSICAL EXAM:    General: Well developed; well nourished; in no acute distress  Eyes: Anicteric sclerae, moist conjunctivae  HENT: Moist mucous membranes  Neck: Trachea midline, supple  Lungs: Normal respiratory effort, no intercostal retractions  Cardiovascular: RRR  Abdomen: Soft, non-tender non-distended; Normal bowel sounds; No rebound or guarding  Extremities: Normal range of motion, No clubbing, cyanosis or edema  Neurological: Alert and oriented x3  Skin: Warm and dry. No obvious rash    LABS:                        8.0    7.92  )-----------( 97       ( 29 May 2024 05:30 )             25.2     05-29    131<L>  |  100  |  51<H>  ----------------------------<  85  3.8   |  24  |  1.18    Ca    7.9<L>      29 May 2024 05:30  Phos  3.9     05-29  Mg     2.1     05-29    TPro  7.4  /  Alb  2.0<L>  /  TBili  9.1<H>  /  DBili  x   /  AST  365<H>  /  ALT  133<H>  /  AlkPhos  114  05-29        PT/INR - ( 29 May 2024 00:41 )   PT: 17.8 sec;   INR: 1.58          PTT - ( 29 May 2024 00:41 )  PTT:40.3 sec    Urinalysis with Rflx Culture (collected 29 May 2024 14:36)    Urinalysis with Rflx Culture (collected 29 May 2024 03:56)      RADIOLOGY & ADDITIONAL STUDIES:     Reviewed   Initial Hepatology Consult Note:     HPI:  Patient has had a prolonged hospital course significant for recurrent AKIs, cholestatic transaminitis s/p perc cony, prior DVTs, PNAs, fungemia, sinus pauses s/p pacemaker. and recurrent bleeding from fistula recently s/p IR embolization of L branch of inferior epigastric artery via R groin access (4/16), with multiple presentations afterwards for recurrent bleeding at fistula site requiring transfusions. Most recently admitted 5/5/24 for BRB from ECF requiring multiple transfusions, was transferred to SICU, where bcx returned positive for MSSA managed with Cefazolin 2g (5/10-6/10), was transferred to [facility] for further surgical care, however found to have elevated portal HTN, now transferred back to [facility] admitted to telemetry. Patient seen and examined at bedside. Describes that overall he feels well. States that these episodes of bright red blood from the fistula occur randomly, more often after eating or moving/exerting himself. States that he quit taking Gattex as thought maybe that made the bleeding episodes worse but after doing that noticed that fistula out-put increased significantly. When he bleeds, it is a significant amount of blood that he describes spurts out and requires holding pressure to help tamponade. Was evaluated at [facility] for possible surgery on his enteric fistula but says that once he was diagnosed with portal HTN, they deemed him to be a poor surgical candidate and instead recommended comfort guided care moving forward. 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     Allergies  penicillin (Angioedema)  Intolerances      Home Medications:  allopurinol 300 mg oral tablet: 1 tab(s) orally once a day (16 Apr 2024 10:54)  bacitracin 500 units/g topical ointment: 1 Apply topically to affected area 2 times a day (20 May 2024 12:44)  ceFAZolin 2 g intravenous injection: 2 gram(s) intravenous every 12 hours Please continue with 1 month course until 6/10 for MSSA bacteremia. (29 May 2024 02:43)  cholestyramine 4 g/9 g oral powder for reconstitution: 4,000 milligram(s) orally once a day Please give 4 grams of cholestyramine PO daily (20 May 2024 12:44)  hydrocortisone 100 mg/60 mL rectal suspension: 60 milliliter(s) rectal every 12 hours (20 May 2024 12:44)  ipratropium-albuterol 0.5 mg-2.5 mg/3 mL inhalation solution: 3 milliliter(s) inhaled every 6 hours (20 May 2024 12:44)  levothyroxine 40 mcg (0.04 mg)/mL intravenous solution: 40 microgram(s) intravenous once a day (20 May 2024 13:14)  LORazepam 1 mg/mL-NaCl 0.9% intravenous solution: 0.25 milliliter(s) intravenous once a day As needed Anxiety (20 May 2024 12:44)  melatonin 5 mg oral tablet: 1 tab(s) orally once a day (at bedtime) (20 May 2024 12:44)  nystatin 100,000 units/g topical powder: 1 Apply topically to affected area (20 May 2024 12:44)  ondansetron 2 mg/mL injectable solution: 4 milligram(s) injectable 4 times a day as needed for  nausea (20 May 2024 12:44)  rosuvastatin 5 mg oral tablet: 1 tab(s) orally once a day (16 Apr 2024 10:56)  ursodiol 300 mg oral capsule: 1 cap(s) orally every 8 hours (20 May 2024 12:44)  venlafaxine 150 mg oral capsule, extended release: 2 cap(s) orally once a day (20 May 2024 12:44)    MEDICATIONS:  MEDICATIONS  (STANDING):  allopurinol 300 milliGRAM(s) Oral every 24 hours  atorvastatin 20 milliGRAM(s) Oral at bedtime  ceFAZolin   IVPB 2000 milliGRAM(s) IV Intermittent every 8 hours  cholestyramine Powder (Sugar-Free) 4 Gram(s) Oral every 24 hours  dextrose 10% Bolus 125 milliLiter(s) IV Bolus once  dextrose 5%. 1000 milliLiter(s) (100 mL/Hr) IV Continuous <Continuous>  dextrose 5%. 1000 milliLiter(s) (50 mL/Hr) IV Continuous <Continuous>  dextrose 50% Injectable 25 Gram(s) IV Push once  dextrose 50% Injectable 12.5 Gram(s) IV Push once  ergocalciferol 35149 Unit(s) Oral <User Schedule>  glucagon  Injectable 1 milliGRAM(s) IntraMuscular once  hydrocortisone Enema 100 milliGRAM(s) Rectal every 12 hours  insulin lispro (ADMELOG) corrective regimen sliding scale   SubCutaneous three times a day before meals  insulin lispro (ADMELOG) corrective regimen sliding scale   SubCutaneous at bedtime  levothyroxine 50 MICROGram(s) Oral every 24 hours  lipid, fat emulsion (Fish Oil and Plant Based) 20% Infusion 0.51 Gm/kG/Day (20.83 mL/Hr) IV Continuous <Continuous>  metoprolol tartrate 50 milliGRAM(s) Oral every 12 hours  pancrelipase  (CREON 24,000 Lipase Units) 1 Capsule(s) Oral three times a day with meals  Parenteral Nutrition - Adult 1 Each (83 mL/Hr) TPN Continuous <Continuous>  ursodiol Capsule 300 milliGRAM(s) Oral every 8 hours  venlafaxine XR. 300 milliGRAM(s) Oral every 24 hours    MEDICATIONS  (PRN):  albuterol/ipratropium for Nebulization 3 milliLiter(s) Nebulizer every 6 hours PRN Shortness of Breath and/or Wheezing  dextrose Oral Gel 15 Gram(s) Oral once PRN Blood Glucose LESS THAN 70 milliGRAM(s)/deciliter  LORazepam   Injectable 0.25 milliGRAM(s) IV Push every 24 hours PRN Anxiety  melatonin 5 milliGRAM(s) Oral at bedtime PRN Insomnia    PAST MEDICAL & SURGICAL HISTORY:  Essential hypertension  Hypertension      Atrial fibrillation  s/p cardioversion 2010 and 2014  Pt. reports 4 DCCV  Now on Amiodarone 200mg PO bid and Eliquis 5mg PO bid  Last DCCV 4 yrs ago at [facility]      Crohn's disease  s/p partial resection of ileum      Hyperlipidemia      Hypothyroidism      History of depression  On Venlafaxine ER 150mg PO bid      Junctional rhythm      Bradycardia      H/O knee surgery      History of cataract surgery      S/P appendectomy        FAMILY HISTORY:    SOCIAL HISTORY:  Tobacoo: [ ] Current, [ ] Former, [ ] Never; Pack Years:  Alcohol:  Illicit Drugs:        Vital Signs Last 24 Hrs  T(C): 36.6 (29 May 2024 18:07), Max: 37.3 (29 May 2024 01:03)  T(F): 97.8 (29 May 2024 18:07), Max: 99.2 (29 May 2024 01:03)  HR: 96 (29 May 2024 16:42) (72 - 100)  BP: 113/73 (29 May 2024 16:42) (102/55 - 145/69)  BP(mean): 86 (29 May 2024 16:42) (75 - 99)  RR: 18 (29 May 2024 16:42) (18 - 18)  SpO2: 99% (29 May 2024 16:42) (94% - 99%)    Parameters below as of 29 May 2024 16:42  Patient On (Oxygen Delivery Method): room air        05-28 @ 07:01 - 05-29 @ 07:00  --------------------------------------------------------  IN: 0 mL / OUT: 1800 mL / NET: -1800 mL    05-29 @ 07:01  -  05-29 @ 20:12  --------------------------------------------------------  IN: 0 mL / OUT: 3550 mL / NET: -3550 mL      PHYSICAL EXAM:  General: No acute distress, jaundiced   Lungs: Normal respiratory effort and no intercostal retractions  Cardiovascular: RRR  Abdomen: Soft, non-tender, non-distended, +ostomy bag and ileostomy with minimal comments, +ostomy bag on site of enteric fistula with a continuous, large amount of brown out-put   Neurological: Alert and oriented x3 but fatigued appearing   Skin: Warm and dry. No obvious rash        LABS:                        8.0    7.92  )-----------( 97       ( 29 May 2024 05:30 )             25.2     05-29    131<L>  |  100  |  51<H>  ----------------------------<  85  3.8   |  24  |  1.18    Ca    7.9<L>      29 May 2024 05:30  Phos  3.9     05-29  Mg     2.1     05-29    TPro  7.4  /  Alb  2.0<L>  /  TBili  9.1<H>  /  DBili  x   /  AST  365<H>  /  ALT  133<H>  /  AlkPhos  114  05-29        PT/INR - ( 29 May 2024 00:41 )   PT: 17.8 sec;   INR: 1.58          PTT - ( 29 May 2024 00:41 )  PTT:40.3 sec    Urinalysis with Rflx Culture (collected 29 May 2024 14:36)    Urinalysis with Rflx Culture (collected 29 May 2024 03:56)      RADIOLOGY & ADDITIONAL STUDIES:     Reviewed

## 2024-05-29 NOTE — H&P ADULT - NSICDXPASTMEDICALHX_GEN_ALL_CORE_FT
PAST MEDICAL HISTORY:  Atrial fibrillation s/p cardioversion 2010 and 2014  Pt. reports 4 DCCV  Now on Amiodarone 200mg PO bid and Eliquis 5mg PO bid  Last DCCV 4 yrs ago at [facility]    Bradycardia     Crohn's disease s/p partial resection of ileum    Essential hypertension Hypertension    History of depression On Venlafaxine ER 150mg PO bid    Hyperlipidemia     Hypothyroidism     Junctional rhythm

## 2024-05-29 NOTE — H&P ADULT - PROBLEM SELECTOR PLAN 4
Anemia 2/2 EC fistula bleed. Last admission received 12u PRBCs. Hgb on admission 7.9.  - F/u surgery recs for management of EC bleed  - Maintain active T&S   - Transfuse <7

## 2024-05-29 NOTE — DIETITIAN INITIAL EVALUATION ADULT - PROBLEM SELECTOR PLAN 9
Home med: venlafaxine 150 mg 2 tabs QD. Ativan 1mg qd PRN for anxiety  - C/w home meds    #Gout  Last discharged on allopurinol 300 qd  - C/w home meds

## 2024-05-29 NOTE — DIETITIAN INITIAL EVALUATION ADULT - OTHER INFO
78M with PMHx of AFib/Flutter, Crohns SBO s/p open TRE and SBR c/b abdominal wall dehiscence and development of enteroatmospheric fistula, recurrent AKIs, cholestatic transaminitis s/p perc cony, prior DVTs, PNAs, fungemia, sinus pauses s/p pacemaker and recurrent bleeding from fistula, recently transferred to [place] for further surgical care, now transferred back to [facility] admitted to [facility] for management of portal HTN.     Pt assessed at bedside on 7LA. Rx and labs reviewed. Pt received resting in bed, in care with RN x2 attempts. Pt known to clinical nutrition services for long-term TPN. See recommendations below; continue with 50g SMOF on Mondays and Omegaven 56g the remainder of the week; consider advancing Omegaven slowly toward goal of ~1g/kg/day (~85g Omegaven, 655 calories/day). Continue goal 2gProt./kg/day or 167g amino acids/day of ideal body weight; account for hypermetabolic states and fistula losses. Monitor wt trends and BG control; consider goal of 23-25 nonprotein calories/day to meet increased demands and accout for losses. Plan to start clear liquid diet; pt previously tolerating. Advance diet as tolerated with goal of low fiber diet with Ottoniel BID. Plan for TIPS procedure.     Weight History:   4/25 - 95.8  5/1 - 80 kg  5/9 - 97.5 kg     Labs today: Na 131 (L), K 3.8, Cl 100, AG 7, BUN 51 (H), Cr 1.18, Ca 7.9 (L), Alb 2.0 (L), T.Bili 9.1 (H), Alk Phos 114, ASK 3685 (H),  ASLT 133 (H), GFR 64, Mg 64, Phos 2.1,  (H), BG x24hr = , I/O's x24hrs = -1.8L    Pain: 0 per chart review   GI: RUQ ileostomy, LLQ fistula, last bowel movement 5/29  Skin: no edema noted

## 2024-05-29 NOTE — H&P ADULT - PROBLEM SELECTOR PLAN 3
Pt with multiple surgery, s/p ostomy placement. Patient having bleeding from ostomy site. Surgery following for intervention.   - C/w home creon 24,000U TID w meals   - C/w ursodiol 300mg q8h  - C/w ostomy bag care by personal wound care. Pt with multiple surgery, s/p ostomy placement. Patient having bleeding from ostomy site. Surgery following for intervention.   - C/w home creon 24,000U TID w meals   - C/w ostomy bag care by personal wound care.  - C/w hydrocortisone enema     #Cholelithiasis  - C/w ursodiol 300mg q8h  - C/w cholestyramine 4g qd

## 2024-05-29 NOTE — CONSULT NOTE ADULT - SUBJECTIVE AND OBJECTIVE BOX
78 year old male with past medical history of AFib/Flutter with numerous DCCVs in the past with prolonged admission last year for Crohns SBO s/p open TRE and SBR c/b abdominal wall dehiscence and development of enteroatmospheric fistula; prolonged hospital course significant for recurrent AKIs, cholestatic transaminitis s/p perc cony, prior DVTs, PNAs, fungemia, sinus pauses s/p pacemaker and recurrent bleeding from fistula most recently s/p IR embolization of L branch of inferior epigastric artery via R groin access (4/16), with multiple readmissions for continued bleeding from fistula and ostomy sites s/p ileoscopy on noting an ulcer w/ clot proximal to stoma w/ hemoclip was applied (5/1), re-admitted (5/5/24) for symptomatic anemia secondary to bleeding from fistula site and ileostomy sites s/p ileoscopy, noting erythematous patch of bowel just deep to the ECF, without any active bleeding (5/9), s/p IR angiography noting  several areas of hyperemia in the jejunum adjacent to the stoma however no evidence of active bleeding (5/9) transferred to [facility] for definitive operative management however canceled due to portal hypertension now transferred to [facility] for possible TIPS procedure. AMRITA noted at OSH. On admission, patient denies LOC, dizziness, cp, sob, abdominal pain. Noted medium volume bloody output from fistula site now stopped.    On arrival, patient is afebrile, , normotensive, O2 sat 98% on RA. On exam, abdomen is soft, nondistended, nontender; RUQ stoma involuted with thin serous output without stigmata of bleeding; LUQ fistula with bilious drainage and large old clot, removed with persistent ooze from 6 o'clock position, packed with Surgicel powder and sheets. Labs show WBC 7.65, H/H 7.9/24.1, coags wnl.    T(C): 37.3 (05-29-24 @ 01:03), Max: 37.3 (05-29-24 @ 01:03)  HR: 100 (05-29-24 @ 00:10) (100 - 100)  BP: 132/62 (05-29-24 @ 00:10) (132/62 - 132/62)  RR: 18 (05-29-24 @ 00:10) (18 - 18)  SpO2: 98% (05-29-24 @ 00:10) (98% - 98%)    Physical Exam  General: AAOx3, NAD, laying comfortably in bed  Cardio: S1,S2, No MRG  Pulm: Nonlabored breathing  Abdomen: soft, nondistended, nontender; RUQ stoma involuted with thin serous output without stigmata of bleeding; LUQ fistula with bilious drainage and large old clot, removed with persistent ooze from 6 o'clock position, packed with Surgicel powder and sheets.  Extremities: WWP, peripheral pulses appreciated    LABS:                        7.9    7.65  )-----------( 89       ( 29 May 2024 00:41 )             24.1           PT/INR - ( 29 May 2024 00:41 )   PT: 17.8 sec;   INR: 1.58          PTT - ( 29 May 2024 00:41 )  PTT:40.3 sec

## 2024-05-29 NOTE — H&P ADULT - HISTORY OF PRESENT ILLNESS
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 Prolonged hospital course significant for recurrent AKIs, cholestatic transaminitis s/p perc cony, prior DVTs, PNAs, fungemia, sinus pauses s/p pacemaker and recurrent bleeding from fistula recently s/p IR embolization of L branch of inferior epigastric artery via R groin access (4/16), with mutiple presentations afterwards for recurrent bleeding at fistula site requiring transfusions. Most recently admitted 5/5/24 for BRB from ECF requiring multiple transfusions, was transferred to SICU, where bcx returned positive for MSSA managed with Cefazolin 2g (5/10-6/10), was transferred to [place] for further surgical care, now transferred back to [facility] admitted to telemetry.    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 Prolonged hospital course significant for recurrent AKIs, cholestatic transaminitis s/p perc cony, prior DVTs, PNAs, fungemia, sinus pauses s/p pacemaker and recurrent bleeding from fistula recently s/p IR embolization of L branch of inferior epigastric artery via R groin access (4/16), with mutiple presentations afterwards for recurrent bleeding at fistula site requiring transfusions. Most recently admitted 5/5/24 for BRB from ECF requiring multiple transfusions, was transferred to SICU, where bcx returned positive for MSSA managed with Cefazolin 2g (5/10-6/10), was transferred to [place] [facility] for further surgical care, however found to have elevated portal HTN, now transferred back to [facility] admitted to telemetry.    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 Prolonged hospital course significant for recurrent AKIs, cholestatic transaminitis s/p perc cony, prior DVTs, PNAs, fungemia, sinus pauses s/p pacemaker and recurrent bleeding from fistula recently s/p IR embolization of L branch of inferior epigastric artery via R groin access (4/16), with mutiple presentations afterwards for recurrent bleeding at fistula site requiring transfusions. Most recently admitted 5/5/24 for BRB from ECF requiring multiple transfusions, was transferred to SICU, where bcx returned positive for MSSA managed with Cefazolin 2g (5/10-6/10), was transferred to [place] [facility] for further surgical care, however found to have elevated portal HTN, now transferred back to [facility] admitted to telemetry.     Patient seen at bedside, found laying comfortably in bed, NAD. He says he feels well overall and does not have any acute complaints. Suction container with ~400cc of blood that was suctioned via nursing earlier. Per private nurse Bernice, pt did not have any bloody output from stoma while at [facility].

## 2024-05-29 NOTE — H&P ADULT - ATTENDING COMMENTS
Russ Trejo  [id number]  As above, the patient was sent bach to [facility] due to high portal vein pressures and not a surgical candidate.  PLease see above for the details as d/w the house staff.  -Portal hypertension  -MSSA bacteremia  -Crohn's disease  -chronic Afib  -CKD  -hypothyroidism  Please see above for the details.

## 2024-05-29 NOTE — DIETITIAN INITIAL EVALUATION ADULT - PROBLEM SELECTOR PLAN 2
Pt was transferred to [facility] for further care of Crohn's with planned bowel surgery that was held iso new portal hypertension, now transferred back for planned TIPS via IR (with Dr. Zhao)  - Consult IR in am

## 2024-05-29 NOTE — H&P ADULT - ASSESSMENT
78M, well known to surgical service, with PMHx of AFib/Flutter 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 Prolonged hospital course significant for recurrent AKIs, cholestatic transaminitis s/p perc cony, prior DVTs, PNAs, fungemia, sinus pauses s/p pacemaker and recurrent bleeding from fistula most recently s/p IR embolization of L branch of inferior epigastric artery via R groin access (4/16), with multiple readmissions for continued bleeding from fistula and ostomy sites. Patient was transferred to [place] for further surgical care, now transferred back to [facility] admitted to [facility] for further care 78M with PMHx of AFib/Flutter, Crohns SBO s/p open TRE and SBR c/b abdominal wall dehiscence and development of enteroatmospheric fistula, recurrent AKIs, cholestatic transaminitis s/p perc cony, prior DVTs, PNAs, fungemia, sinus pauses s/p pacemaker and recurrent bleeding from fistula, recently transferred to [place] for further surgical care, now transferred back to [facility] admitted to [facility] for management of portal HTN

## 2024-05-29 NOTE — H&P ADULT - PROBLEM SELECTOR PLAN 2
Anemia 2/2 EC fistula bleed. Last admission received 12u PRBCs. Hgb on admission 7.9.  - F/u surgery recs for management of EC bleed  - Maintain active T&S   - Transfuse <7 Pt was transferred to [facility] for further care of Crohn's with planned bowel surgery that was held iso new portal hypertension, now transferred back for planned TIPS via IR  - Consult IR in am Pt was transferred to [facility] for further care of Crohn's with planned bowel surgery that was held iso new portal hypertension, now transferred back for planned TIPS via IR (with Dr. Zhao)  - Consult IR in am

## 2024-05-29 NOTE — PROGRESS NOTE ADULT - PROBLEM SELECTOR PLAN 6
Px with Crt 1.73, baseline ~1.5.  - Avoid nephrotoxics   - Monitor crt Px with Crt 1.73, baseline ~1.5.  - Avoid nephrotoxic agents   - trend Cr

## 2024-05-29 NOTE — H&P ADULT - PROBLEM SELECTOR PLAN 1
MSSA bacteremia from previous hospitalization, line associated. BCX+ 5/9. RUE PICC was removed and replaced. Pt was started on Cefazolin 2g with expected course 5/9.  - C/w Cefazolin 2g q8h 4 week total duration (EOT 6/8/24)  - Case surveillance cultures 5/29, f/u growth MSSA bacteremia from previous hospitalization, line associated. BCX+ 5/9. RUE PICC was removed and replaced. Pt was started on Cefazolin 2g with expected course 5/9.  - C/w Cefazolin 2g q8h 4 week total duration (EOT 6/8/24) per previous ID recs  - Case surveillance cultures 5/29, f/u growth  - Can reconsult ID in am

## 2024-05-29 NOTE — DIETITIAN INITIAL EVALUATION ADULT - OTHER CALCULATIONS
Fluids per team. Needs determined using ideal body weight 83.6 kg adjusted for clinical judgement for high output fistula, long-term TPN, Chron's, bacteremia.

## 2024-05-29 NOTE — CHART NOTE - NSCHARTNOTEFT_GEN_A_CORE
Patient transferred from [facility], admitted to telemetry for management of portal hypertension.   At this tie, PICC line dressing c/d/i with no signs of infection. PICC line kept in place due to need for TPN.    Cultures were drawn on admission, currently specimen received by lab. If blood cultures come back positive, the CVC will be removed promptly.     Type of CVC: Double lumen PICC line  Location of CVC: Left Upper Extremity     This patient was admitted with the above CVC. I reviewed the CVC checklist and determined that the CVC can be maintained and OK to use. Patient transferred from [facility], admitted to telemetry for management of portal hypertension.   At this time, PICC line dressing c/d/i with no signs of infection. PICC line kept in place due to need for TPN.    Cultures were drawn on admission, currently specimen received by lab. If blood cultures come back positive, the CVC will be removed promptly.     Type of CVC: Double lumen PICC line  Location of CVC: Left Upper Extremity     This patient was admitted with the above CVC. I reviewed the CVC checklist and determined that the CVC can be maintained and OK to use.

## 2024-05-29 NOTE — PROGRESS NOTE ADULT - PROBLEM SELECTOR PLAN 10
F: TPN with luca  E: replete as needed  N: regular/TPN  VTE Prophylaxis: SCDs   Activity: ambulate as tolerated  D: 7L   FULL CODE. F: none  E: replete as needed  N: TPN with smoflipid and clear liquid diet  VTE Prophylaxis: SCDs   Activity: ambulate as tolerated  D: 7L   FULL CODE.

## 2024-05-29 NOTE — DIETITIAN INITIAL EVALUATION ADULT - NSFNSGIIOFT_GEN_A_CORE
05-28-24 @ 07:01  -  05-29-24 @ 07:00  --------------------------------------------------------  OUT:    Ileostomy (mL): 100 mL  Total OUT: 100 mL    Total NET: -100 mL

## 2024-05-29 NOTE — H&P ADULT - NSHPLABSRESULTS_GEN_ALL_CORE
.  LABS:                         RADIOLOGY, EKG & ADDITIONAL TESTS: Reviewed. .  LABS:                         7.9    7.65  )-----------( 89       ( 29 May 2024 00:41 )             24.1         134<L>  |  102  |  51<H>  ----------------------------<  102<H>  3.7   |  27  |  1.29    Ca    7.6<L>      29 May 2024 00:41  Phos  3.4       Mg     2.0         TPro  6.8  /  Alb  2.0<L>  /  TBili  9.2<H>  /  DBili  x   /  AST  378<H>  /  ALT  131<H>  /  AlkPhos  100      PT/INR - ( 29 May 2024 00:41 )   PT: 17.8 sec;   INR: 1.58          PTT - ( 29 May 2024 00:41 )  PTT:40.3 sec  Urinalysis Basic - ( 29 May 2024 03:56 )    Color: Dark Yellow / Appearance: Clear / S.017 / pH: x  Gluc: x / Ketone: Negative mg/dL  / Bili: Moderate / Urobili: 0.2 mg/dL   Blood: x / Protein: 30 mg/dL / Nitrite: Negative   Leuk Esterase: Negative / RBC: 2 /HPF / WBC 0 /HPF   Sq Epi: x / Non Sq Epi: 1 /HPF / Bacteria: Negative /HPF                RADIOLOGY, EKG & ADDITIONAL TESTS: Reviewed.

## 2024-05-30 NOTE — PROGRESS NOTE ADULT - PROBLEM SELECTOR PLAN 3
s/p ileocecectomy, s/p right –sided ostomy placement. Also with enteroatmospheric fistula, identified as frequent source of bleeding. Surgery following  - c/w home creon 24,000U TID w meals   - c/w ostomy bag care by personal wound care.  - c/w hydrocortisone enema   - f/u surgery recs    #Cholelithiasis  - C/w ursodiol 300mg q8h  - C/w cholestyramine 4g qd

## 2024-05-30 NOTE — PROGRESS NOTE ADULT - SUBJECTIVE AND OBJECTIVE BOX
OVERNIGHT EVENTS:    SUBJECTIVE / INTERVAL HPI: Patient seen and examined at bedside.     VITAL SIGNS:  Vital Signs Last 24 Hrs  T(C): 36.4 (30 May 2024 05:13), Max: 37.1 (29 May 2024 22:12)  T(F): 97.5 (30 May 2024 05:13), Max: 98.7 (29 May 2024 22:12)  HR: 63 (30 May 2024 06:13) (63 - 96)  BP: 105/53 (30 May 2024 06:13) (95/50 - 129/60)  BP(mean): 78 (30 May 2024 05:44) (75 - 86)  RR: 16 (30 May 2024 06:13) (11 - 18)  SpO2: 95% (30 May 2024 06:13) (94% - 99%)    Parameters below as of 30 May 2024 05:44  Patient On (Oxygen Delivery Method): room air        PHYSICAL EXAM:    General: alert, in no acute distress  HEENT: NC/AT; PERRL, anicteric sclera; MMM  Neck: supple  Cardiovascular: +S1/S2, RRR  Respiratory: CTA B/L; no W/R/R  Gastrointestinal: soft, NT/ND; +BSx4  Extremities: WWP; no edema, clubbing or cyanosis  Vascular: 2+ radial, DP/PT pulses B/L  Neurological: AAOx3; no focal deficits    MEDICATIONS:  MEDICATIONS  (STANDING):  allopurinol 300 milliGRAM(s) Oral every 24 hours  atorvastatin 20 milliGRAM(s) Oral at bedtime  carvedilol 3.125 milliGRAM(s) Oral every 12 hours  ceFAZolin   IVPB 2000 milliGRAM(s) IV Intermittent every 8 hours  cholestyramine Powder (Sugar-Free) 4 Gram(s) Oral every 24 hours  dextrose 10% Bolus 125 milliLiter(s) IV Bolus once  dextrose 5%. 1000 milliLiter(s) (100 mL/Hr) IV Continuous <Continuous>  dextrose 5%. 1000 milliLiter(s) (50 mL/Hr) IV Continuous <Continuous>  dextrose 50% Injectable 25 Gram(s) IV Push once  dextrose 50% Injectable 12.5 Gram(s) IV Push once  ergocalciferol 59747 Unit(s) Oral <User Schedule>  glucagon  Injectable 1 milliGRAM(s) IntraMuscular once  hydrocortisone Enema 100 milliGRAM(s) Rectal every 12 hours  insulin lispro (ADMELOG) corrective regimen sliding scale   SubCutaneous at bedtime  insulin lispro (ADMELOG) corrective regimen sliding scale   SubCutaneous three times a day before meals  levothyroxine 50 MICROGram(s) Oral every 24 hours  lipid, fat emulsion (Fish Oil and Plant Based) 20% Infusion 0.51 Gm/kG/Day (20.83 mL/Hr) IV Continuous <Continuous>  octreotide  Infusion 50 MICROgram(s)/Hr (10 mL/Hr) IV Continuous <Continuous>  pancrelipase  (CREON 24,000 Lipase Units) 1 Capsule(s) Oral three times a day with meals  Parenteral Nutrition - Adult 1 Each (83 mL/Hr) TPN Continuous <Continuous>  ursodiol Capsule 300 milliGRAM(s) Oral every 8 hours  venlafaxine XR. 300 milliGRAM(s) Oral every 24 hours    MEDICATIONS  (PRN):  albuterol/ipratropium for Nebulization 3 milliLiter(s) Nebulizer every 6 hours PRN Shortness of Breath and/or Wheezing  dextrose Oral Gel 15 Gram(s) Oral once PRN Blood Glucose LESS THAN 70 milliGRAM(s)/deciliter  LORazepam   Injectable 0.25 milliGRAM(s) IV Push every 24 hours PRN Anxiety  melatonin 5 milliGRAM(s) Oral at bedtime PRN Insomnia      ALLERGIES:  Allergies    penicillin (Angioedema)    Intolerances        LABS:                        8.0    7.92  )-----------( 97       ( 29 May 2024 05:30 )             25.2     05-30    x   |  x   |  x   ----------------------------<  125<H>  x    |  24  |  x     Ca    7.9<L>      29 May 2024 05:30  Phos  3.5     05-30  Mg     2.2     05-30    TPro  7.4  /  Alb  2.0<L>  /  TBili  9.1<H>  /  DBili  x   /  AST  365<H>  /  ALT  133<H>  /  AlkPhos  114  05-29    PT/INR - ( 29 May 2024 00:41 )   PT: 17.8 sec;   INR: 1.58          PTT - ( 29 May 2024 00:41 )  PTT:40.3 sec  Urinalysis Basic - ( 30 May 2024 05:30 )    Color: x / Appearance: x / SG: x / pH: x  Gluc: 125 mg/dL / Ketone: x  / Bili: x / Urobili: x   Blood: x / Protein: x / Nitrite: x   Leuk Esterase: x / RBC: x / WBC x   Sq Epi: x / Non Sq Epi: x / Bacteria: x      CAPILLARY BLOOD GLUCOSE      POCT Blood Glucose.: 138 mg/dL (30 May 2024 06:51)      RADIOLOGY & ADDITIONAL TESTS: Reviewed. OVERNIGHT EVENTS: small clot in fistula bag, no bleeding     SUBJECTIVE / INTERVAL HPI: Patient seen and examined at bedside.     VITAL SIGNS:  Vital Signs Last 24 Hrs  T(C): 36.4 (30 May 2024 05:13), Max: 37.1 (29 May 2024 22:12)  T(F): 97.5 (30 May 2024 05:13), Max: 98.7 (29 May 2024 22:12)  HR: 63 (30 May 2024 06:13) (63 - 96)  BP: 105/53 (30 May 2024 06:13) (95/50 - 129/60)  BP(mean): 78 (30 May 2024 05:44) (75 - 86)  RR: 16 (30 May 2024 06:13) (11 - 18)  SpO2: 95% (30 May 2024 06:13) (94% - 99%)    Parameters below as of 30 May 2024 05:44  Patient On (Oxygen Delivery Method): room air        PHYSICAL EXAM:    General: alert, in no acute distress  HEENT: NC/AT; PERRL, anicteric sclera; MMM  Neck: supple, no JVP  Cardiovascular: +S1/S2, RRR  Respiratory: CTA B/L; no W/R/R  Gastrointestinal: soft, right sided stoma clear, large left non bleeding fistula with major wound dehiscence with serous discharge   Extremities: WWP; +3 pitting edema, B/L, PICC line in LUE intact   Vascular: 2+ radial, DP/PT pulses B/L  Neurological: AAOx3; no focal deficits    MEDICATIONS:  MEDICATIONS  (STANDING):  allopurinol 300 milliGRAM(s) Oral every 24 hours  atorvastatin 20 milliGRAM(s) Oral at bedtime  carvedilol 3.125 milliGRAM(s) Oral every 12 hours  ceFAZolin   IVPB 2000 milliGRAM(s) IV Intermittent every 8 hours  cholestyramine Powder (Sugar-Free) 4 Gram(s) Oral every 24 hours  dextrose 10% Bolus 125 milliLiter(s) IV Bolus once  dextrose 5%. 1000 milliLiter(s) (100 mL/Hr) IV Continuous <Continuous>  dextrose 5%. 1000 milliLiter(s) (50 mL/Hr) IV Continuous <Continuous>  dextrose 50% Injectable 25 Gram(s) IV Push once  dextrose 50% Injectable 12.5 Gram(s) IV Push once  ergocalciferol 82335 Unit(s) Oral <User Schedule>  glucagon  Injectable 1 milliGRAM(s) IntraMuscular once  hydrocortisone Enema 100 milliGRAM(s) Rectal every 12 hours  insulin lispro (ADMELOG) corrective regimen sliding scale   SubCutaneous at bedtime  insulin lispro (ADMELOG) corrective regimen sliding scale   SubCutaneous three times a day before meals  levothyroxine 50 MICROGram(s) Oral every 24 hours  lipid, fat emulsion (Fish Oil and Plant Based) 20% Infusion 0.51 Gm/kG/Day (20.83 mL/Hr) IV Continuous <Continuous>  octreotide  Infusion 50 MICROgram(s)/Hr (10 mL/Hr) IV Continuous <Continuous>  pancrelipase  (CREON 24,000 Lipase Units) 1 Capsule(s) Oral three times a day with meals  Parenteral Nutrition - Adult 1 Each (83 mL/Hr) TPN Continuous <Continuous>  ursodiol Capsule 300 milliGRAM(s) Oral every 8 hours  venlafaxine XR. 300 milliGRAM(s) Oral every 24 hours    MEDICATIONS  (PRN):  albuterol/ipratropium for Nebulization 3 milliLiter(s) Nebulizer every 6 hours PRN Shortness of Breath and/or Wheezing  dextrose Oral Gel 15 Gram(s) Oral once PRN Blood Glucose LESS THAN 70 milliGRAM(s)/deciliter  LORazepam   Injectable 0.25 milliGRAM(s) IV Push every 24 hours PRN Anxiety  melatonin 5 milliGRAM(s) Oral at bedtime PRN Insomnia      ALLERGIES:  Allergies    penicillin (Angioedema)    Intolerances        LABS:                        8.0    7.92  )-----------( 97       ( 29 May 2024 05:30 )             25.2     05-30    x   |  x   |  x   ----------------------------<  125<H>  x    |  24  |  x     Ca    7.9<L>      29 May 2024 05:30  Phos  3.5     05-30  Mg     2.2     05-30    TPro  7.4  /  Alb  2.0<L>  /  TBili  9.1<H>  /  DBili  x   /  AST  365<H>  /  ALT  133<H>  /  AlkPhos  114  05-29    PT/INR - ( 29 May 2024 00:41 )   PT: 17.8 sec;   INR: 1.58          PTT - ( 29 May 2024 00:41 )  PTT:40.3 sec  Urinalysis Basic - ( 30 May 2024 05:30 )    Color: x / Appearance: x / SG: x / pH: x  Gluc: 125 mg/dL / Ketone: x  / Bili: x / Urobili: x   Blood: x / Protein: x / Nitrite: x   Leuk Esterase: x / RBC: x / WBC x   Sq Epi: x / Non Sq Epi: x / Bacteria: x      CAPILLARY BLOOD GLUCOSE      POCT Blood Glucose.: 138 mg/dL (30 May 2024 06:51)      RADIOLOGY & ADDITIONAL TESTS: Reviewed.

## 2024-05-30 NOTE — CONSULT NOTE ADULT - ASSESSMENT
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 Elongated toenails x 10.     Plan:    -trimmed elongated toenails x 10  -Recc offloading boots while in bed to prevent pressure ulcerations    Podiatry signing off, plan dw with attending

## 2024-05-30 NOTE — PROGRESS NOTE ADULT - SUBJECTIVE AND OBJECTIVE BOX
Hepatology Consult Progress Note:     OVERNIGHT EVENTS: AMRITA.    SUBJECTIVE / INTERVAL HPI:   Patient seen and examined at bedside. Resting comfortably in a chair. Now on octreotide and Coreg.       VITAL SIGNS:  Vital Signs Last 24 Hrs  T(C): 37.2 (30 May 2024 10:46), Max: 37.2 (30 May 2024 10:46)  T(F): 99 (30 May 2024 10:46), Max: 99 (30 May 2024 10:46)  HR: 66 (30 May 2024 08:28) (63 - 96)  BP: 148/63 (30 May 2024 08:28) (95/50 - 148/63)  BP(mean): 91 (30 May 2024 08:28) (75 - 91)  RR: 17 (30 May 2024 08:28) (11 - 18)  SpO2: 94% (30 May 2024 08:28) (94% - 99%)    Parameters below as of 30 May 2024 08:28  Patient On (Oxygen Delivery Method): room air        05-29-24 @ 07:01  -  05-30-24 @ 07:00  --------------------------------------------------------  IN: 1362.3 mL / OUT: 6500 mL / NET: -5137.7 mL    05-30-24 @ 07:01 - 05-30-24 @ 10:49  --------------------------------------------------------  IN: 40 mL / OUT: 350 mL / NET: -310 mL      PHYSICAL EXAM:  General: No acute distress, mildly jaundiced   Lungs: Normal respiratory effort and no intercostal retractions  Cardiovascular: RRR  Abdomen: Soft, non-tender, non-distended, +ileostomy, +fistula site covered by ostomy bag   Neurological: Alert and oriented x3  Skin: Warm and dry. No obvious rash      MEDICATIONS:  MEDICATIONS  (STANDING):  allopurinol 300 milliGRAM(s) Oral every 24 hours  atorvastatin 20 milliGRAM(s) Oral at bedtime  carvedilol 3.125 milliGRAM(s) Oral every 12 hours  ceFAZolin   IVPB 2000 milliGRAM(s) IV Intermittent every 8 hours  cholestyramine Powder (Sugar-Free) 4 Gram(s) Oral every 24 hours  dextrose 10% Bolus 125 milliLiter(s) IV Bolus once  dextrose 5%. 1000 milliLiter(s) (100 mL/Hr) IV Continuous <Continuous>  dextrose 5%. 1000 milliLiter(s) (50 mL/Hr) IV Continuous <Continuous>  dextrose 50% Injectable 25 Gram(s) IV Push once  dextrose 50% Injectable 12.5 Gram(s) IV Push once  ergocalciferol 46984 Unit(s) Oral <User Schedule>  glucagon  Injectable 1 milliGRAM(s) IntraMuscular once  hydrocortisone Enema 100 milliGRAM(s) Rectal every 12 hours  insulin lispro (ADMELOG) corrective regimen sliding scale   SubCutaneous at bedtime  insulin lispro (ADMELOG) corrective regimen sliding scale   SubCutaneous three times a day before meals  levothyroxine 50 MICROGram(s) Oral every 24 hours  lipid, fat emulsion (Fish Oil and Plant Based) 20% Infusion 0.51 Gm/kG/Day (20.83 mL/Hr) IV Continuous <Continuous>  octreotide  Infusion 50 MICROgram(s)/Hr (10 mL/Hr) IV Continuous <Continuous>  [place] 10g/100mL 60 Gram(s)   (50 mL/Hr) IV Continuous <Continuous>  pancrelipase  (CREON 24,000 Lipase Units) 1 Capsule(s) Oral three times a day with meals  Parenteral Nutrition - Adult 1 Each (83 mL/Hr) TPN Continuous <Continuous>  Parenteral Nutrition - Adult 1 Each (83 mL/Hr) TPN Continuous <Continuous>  ursodiol Capsule 300 milliGRAM(s) Oral every 8 hours  venlafaxine XR. 300 milliGRAM(s) Oral every 24 hours    MEDICATIONS  (PRN):  albuterol/ipratropium for Nebulization 3 milliLiter(s) Nebulizer every 6 hours PRN Shortness of Breath and/or Wheezing  dextrose Oral Gel 15 Gram(s) Oral once PRN Blood Glucose LESS THAN 70 milliGRAM(s)/deciliter  LORazepam   Injectable 0.25 milliGRAM(s) IV Push every 24 hours PRN Anxiety  melatonin 5 milliGRAM(s) Oral at bedtime PRN Insomnia      ALLERGIES:  Allergies    penicillin (Angioedema)    Intolerances        LABS:                        7.9    8.25  )-----------( 107      ( 30 May 2024 05:30 )             25.2     05-30    134<L>  |  104  |  55<H>  ----------------------------<  125<H>  4.3   |  24  |  1.37<H>    Ca    7.8<L>      30 May 2024 05:30  Phos  3.5     05-30  Mg     2.2     05-30    TPro  6.9  /  Alb  1.9<L>  /  TBili  9.2<H>  /  DBili  x   /  AST  360<H>  /  ALT  119<H>  /  AlkPhos  101  05-30    PT/INR - ( 29 May 2024 00:41 )   PT: 17.8 sec;   INR: 1.58          PTT - ( 29 May 2024 00:41 )  PTT:40.3 sec  Urinalysis Basic - ( 30 May 2024 05:30 )    Color: x / Appearance: x / SG: x / pH: x  Gluc: 125 mg/dL / Ketone: x  / Bili: x / Urobili: x   Blood: x / Protein: x / Nitrite: x   Leuk Esterase: x / RBC: x / WBC x   Sq Epi: x / Non Sq Epi: x / Bacteria: x      CAPILLARY BLOOD GLUCOSE      POCT Blood Glucose.: 138 mg/dL (30 May 2024 06:51)      Urinalysis with Rflx Culture (collected 05-29-24 @ 14:36)  RADIOLOGY & ADDITIONAL TESTS: Reviewed.

## 2024-05-30 NOTE — PROGRESS NOTE ADULT - PROBLEM SELECTOR PLAN 2
Pt was transferred to [facility] for further care of Crohn's with planned bowel surgery that was held iso new portal hypertension, now transferred back to [facility] for management of portal hypertension.   - Hepatology consulted, recommended discontinuing lopressor and starting coreg 3.125 BID, starting octreotide gtt, and may consider IR embolization with Dr. Zhao Pt was transferred to [facility] for further care of Crohn's with planned bowel surgery that was held iso new portal hypertension, now transferred back to [facility] for management of portal hypertension.   - Hepatology consulted, recommended discontinuing lopressor and starting coreg 3.125 BID, starting octreotide gtt, and may consider IR embolization with Dr. Zhao  - Given addition dose of Furosemide 40 IVP

## 2024-05-30 NOTE — PHYSICAL THERAPY INITIAL EVALUATION ADULT - REHAB POTENTIAL, PT EVAL
Patient attends Pre-Op Testing today following consult with Dr. Navarro due to chronic pain to L hip. Significant surgical/medical histories of L AUGUSTUS in September 2020. Elective revision L AUGUSTUS is now scheduled in this facility for 1/21/21.
good, to achieve stated therapy goals

## 2024-05-30 NOTE — PROGRESS NOTE ADULT - PROBLEM SELECTOR PLAN 5
Diagnosis of AFib/Flutter s/p DCCVs.   Home medication: lopressor 50 mg QD PO  - discontinued lopressor, started coreg 3.125mg BID for management of Afib and portal HTN

## 2024-05-30 NOTE — PHYSICAL THERAPY INITIAL EVALUATION ADULT - PERTINENT HX OF CURRENT PROBLEM, REHAB EVAL
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

## 2024-05-30 NOTE — PROGRESS NOTE ADULT - PROBLEM SELECTOR PLAN 10
F: none  E: replete as needed  N: TPN with smoflipid and clear liquid diet  VTE Prophylaxis: SCDs   Activity: ambulate as tolerated  D: 7L   FULL CODE.

## 2024-05-30 NOTE — PHYSICAL THERAPY INITIAL EVALUATION ADULT - GENERAL OBSERVATIONS, REHAB EVAL
Pt received seated in chair +IVL +tele +ileostomy. RENETTA de anda aware of intent to treat. pt tolerated session well amb 40 ft x2 with RW min A VC to step into RW. pt was left as received with call bell in reach, VSS, and in NAD.

## 2024-05-30 NOTE — PROGRESS NOTE ADULT - ASSESSMENT
78M with PMH of a fib/flutter with numerous DCCVs in the past with prolonged admission last year for Crohn's SBO (s/p open TRE and SBR c/b abdominal wall dehiscence and development of enteroatmospheric fistula), now presenting after recent admission from [place], with bleeding from fistula site and a new diagnosis of portal hypertension based off of liver biopsy done at [place] (wedge pressure of 17). Hepatology consulted for bleeding from ostomy and further management of portal hypertension.     Symptoms possibly 2/2 to bleeding from an ectopic varix.     Recommendations:   - continue Coreg 3.125 mg PO BID and can up-titrate as blood pressure permits to better reduce portal HTN   - continue octreotide gtt to treat for bleeding related to portal hypertension   - consult IR (Dr. Zhao) to determine if an intervention could be possible be performed on an ectopic varix (sclerosis, embolization)   - trend CBC, CMP, and INR daily     Case discussed with Dr. Romero. Hepatology Team will continue to follow.     Esme Barber D.O.   Gastroenterology Fellow  Weekday 7am-5pm Pager: [phone]  Weeknights/Weekend/Holiday Coverage: Please call the  for contact info

## 2024-05-30 NOTE — PROGRESS NOTE ADULT - PROBLEM SELECTOR PLAN 8
Home med: crestor 5mg qd.   - C/w therapeutic conversion lipitor 20mg qhs  - , continue to monitor TG level given TPN

## 2024-05-30 NOTE — PROGRESS NOTE ADULT - ASSESSMENT
Long discussion last night with hepatologist  Biopsy reviewed  Octreotide and carvedilol added to lower portal pressures

## 2024-05-30 NOTE — PROGRESS NOTE ADULT - ASSESSMENT
78M w PMH AFib/Flutter s/p DCCVs, hypothyroidism, depression, Crohn's s/p remote ileocecectomy, prolonged hospital course (06/2023-02/2024) for SBO s/p SBR with ileostomy and enteroatmospheric fistula, short-bowel syndrome, cholestatic transaminitis s/p PCT (removed 02/2024), multiple UE DVTs, candida glabrata fungemia (treated), sinus pauses requiring PPM placement (02/2024), with multiple readmissions for symptomatic anemia 2/2 recurrent bleeding from fistula and ostomy sites, s/p IR embolization of branch of inferior epigastric artery (4/2024), and MSSA bacteremia (5/9/2024), transferred to [facility] on 5/20/24 for further surgical management, found to have portal HTN, transferred back to [facility] for admitted to telemetry for further management.

## 2024-05-30 NOTE — PROGRESS NOTE ADULT - SUBJECTIVE AND OBJECTIVE BOX
S: Feels well. No acute complaints.     O: AFVSS. BP 90/50 at midnight, resolved.    Exam:   General: Awake, alert. Jaundiced  CV: HDS, RRR, warm  Pulm: On room air  Abd: EC fistula pouch with small clot in bag. No active bleeding. Ostomy w/ minimal contents.   Ext: Trace edema    / 1050  Fistula 1000 / 5000  Ileostomy 0 / 0     Labs     AM labs pending    Liver biopsy from [facility] reported as fibrotic tissue, per Dr. Trejo.     A/P: 78 year old male with past medical history of AFib/Flutter with numerous DCCVs in the past with prolonged admission last year for Crohns SBO s/p open TRE and SBR c/b abdominal wall dehiscence and development of enteroatmospheric fistula; prolonged hospital course significant for recurrent AKIs, cholestatic transaminitis s/p perc cony, prior DVTs, PNAs, fungemia, sinus pauses s/p pacemaker and recurrent bleeding from fistula most recently s/p IR embolization of L branch of inferior epigastric artery via R groin access (4/16), with multiple readmissions for continued bleeding from fistula and ostomy sites s/p ileoscopy on noting an ulcer w/ clot proximal to stoma w/ hemoclip was applied (5/1), re-admitted (5/5/24) for symptomatic anemia secondary to bleeding from fistula site and ileostomy sites s/p ileoscopy, noting erythematous patch of bowel just deep to the ECF, without any active bleeding (5/9), s/p IR angiography noting  several areas of hyperemia in the jejunum adjacent to the stoma however no evidence of active bleeding (5/9) transferred to [facility] for definitive operative management however canceled due to portal hypertension now transferred to [facility] for possible TIPS procedure. Afebrile, , normotensive, O2 sat 98% on RA. On exam, abdomen is soft, nondistended, nontender; RUQ stoma involuted with thin serous output without stigmata of bleeding; LUQ fistula with bilious drainage and large old clot, removed with persistent ooze from 6 o'clock position, packed with Surgicel powder and sheets. Labs show WBC 7.65, H/H 7.9/24.1, coags wnl.    Interval update: Seen by hepatology who recommended coreg over metoprolol, octreotide gtt, and IR consult (Pavel) for possible sclerotherapy of abdominal varix.     Consider adding octreotide to TPN for ease of administration if no contraindication  Transfuse hgb <7  If evidence of re-bleeding, please reach out to general surgery team 5C  General surgery team 5C will continue to follow closely

## 2024-05-30 NOTE — PROGRESS NOTE ADULT - PROBLEM SELECTOR PLAN 4
Anemia 2/2 EC fistula bleed. Last admission received 12u PRBCs. Hgb on admission 7.9.    - Maintain active T&S, last T&S 5/29   - Transfuse <7

## 2024-05-30 NOTE — PHYSICAL THERAPY INITIAL EVALUATION ADULT - ADDITIONAL COMMENTS
Patient  resides with spouse in a private apartment, elevator building. Prior to  admission, patient requires assistance with ADLs/IADLs and has private-hire A  24/7. Patient ambulates with a rollator or cane at baseline, also has shower  chair and grab bars in the bathroom.

## 2024-05-30 NOTE — PROGRESS NOTE ADULT - PROBLEM SELECTOR PLAN 9
Home med: venlafaxine 150 mg 2 tabs QD. Ativan 0.25 mg qd PRN for anxiety  - C/w home meds    #Gout  Last discharged on allopurinol 300 qd  - C/w home meds    #Vit D deficiency  Patient previously on ergocalciferol 50,000 qweekly  - Vit D 25 hydroxy <6, resumed ergocalciferol 50,000 qweekly

## 2024-05-31 NOTE — PROGRESS NOTE ADULT - ASSESSMENT
78M with PMH of a fib/flutter with numerous DCCVs in the past with prolonged admission last year for Crohn's SBO (s/p open TRE and SBR c/b abdominal wall dehiscence and development of enteroatmospheric fistula), now presenting after recent admission from [place], with bleeding from fistula site and a new diagnosis of portal hypertension based off of liver biopsy done at [place] (wedge pressure of 17). Hepatology consulted for bleeding from ostomy and further management of portal hypertension.     Symptoms possibly 2/2 to bleeding from an ectopic varix. Tolerating Coreg well and no further episodes of bleeding since being started on Coreg.     Meld 3.0 score: 26 based on 05/30/24 labs.     Recommendations:   - continue Coreg 3.125 mg PO BID and can up-titrate as blood pressure permits to better reduce portal HTN   - continue octreotide gtt to treat for bleeding related to portal hypertension   - consult IR (Dr. Zhao) to determine if an intervention could be possible be performed on an ectopic varix (sclerosis, embolization)   - patient would be a poor candidate for a TIPs procedure   - trend CBC, CMP, and INR daily     Case discussed with Dr. Castle. Hepatology Team will continue to follow.     Esme Barber D.O.   Gastroenterology Fellow  Weekday 7am-5pm Pager: [phone]  Weeknights/Weekend/Holiday Coverage: Please call the  for contact info

## 2024-05-31 NOTE — PROGRESS NOTE ADULT - PROBLEM SELECTOR PLAN 2
Pt was transferred to [facility] for further care of Crohn's with planned bowel surgery that was held iso new portal hypertension, now transferred back to [facility] for management of portal hypertension.   - Hepatology consulted, recommended discontinuing lopressor and starting coreg 3.125 BID, starting octreotide gtt, and may consider IR embolization with Dr. Zhao  - Given addition dose of Furosemide 40 IVP  - Give albumin 50ml dose

## 2024-05-31 NOTE — PROGRESS NOTE ADULT - SUBJECTIVE AND OBJECTIVE BOX
OVERNIGHT EVENTS: was feeling SOB, asked for Lasix    SUBJECTIVE / INTERVAL HPI: Patient seen and examined at bedside.     VITAL SIGNS:  Vital Signs Last 24 Hrs  T(C): 36.5 (31 May 2024 10:00), Max: 36.9 (30 May 2024 14:18)  T(F): 97.7 (31 May 2024 10:00), Max: 98.5 (30 May 2024 14:18)  HR: 86 (31 May 2024 11:56) (72 - 88)  BP: 128/60 (31 May 2024 11:56) (102/60 - 131/57)  BP(mean): 86 (31 May 2024 11:56) (72 - 86)  RR: 20 (31 May 2024 11:56) (18 - 25)  SpO2: 93% (31 May 2024 11:56) (93% - 100%)    Parameters below as of 31 May 2024 11:56  Patient On (Oxygen Delivery Method): room air        PHYSICAL EXAM:    General: alert, in no acute distress  HEENT: NC/AT; PERRL, anicteric sclera; MMM  Neck: supple  Cardiovascular: +S1/S2, RRR  Respiratory: CTA B/L; no W/R/R  Gastrointestinal: soft, NT/ND; +BSx4  Extremities: WWP; no edema, clubbing or cyanosis  Vascular: 2+ radial, DP/PT pulses B/L  Neurological: AAOx3; no focal deficits    MEDICATIONS:  MEDICATIONS  (STANDING):  allopurinol 300 milliGRAM(s) Oral every 24 hours  atorvastatin 20 milliGRAM(s) Oral at bedtime  carvedilol 3.125 milliGRAM(s) Oral every 12 hours  ceFAZolin   IVPB 2000 milliGRAM(s) IV Intermittent every 8 hours  cholestyramine Powder (Sugar-Free) 4 Gram(s) Oral every 24 hours  dextrose 10% Bolus 125 milliLiter(s) IV Bolus once  dextrose 5%. 1000 milliLiter(s) (100 mL/Hr) IV Continuous <Continuous>  dextrose 5%. 1000 milliLiter(s) (50 mL/Hr) IV Continuous <Continuous>  dextrose 50% Injectable 25 Gram(s) IV Push once  dextrose 50% Injectable 12.5 Gram(s) IV Push once  ergocalciferol 52130 Unit(s) Oral <User Schedule>  furosemide   Injectable 40 milliGRAM(s) IV Push once  glucagon  Injectable 1 milliGRAM(s) IntraMuscular once  hydrocortisone Enema 100 milliGRAM(s) Rectal every 12 hours  insulin lispro (ADMELOG) corrective regimen sliding scale   SubCutaneous at bedtime  insulin lispro (ADMELOG) corrective regimen sliding scale   SubCutaneous three times a day before meals  levothyroxine 50 MICROGram(s) Oral every 24 hours  octreotide  Infusion 50 MICROgram(s)/Hr (10 mL/Hr) IV Continuous <Continuous>  Omegaven 10g/100mL 60 Gram(s),OMEGAVEN 60GRAMS/600ML 60 Gram(s) 60 Gram(s) (50 mL/Hr) IV Continuous <Continuous>  pancrelipase  (CREON 24,000 Lipase Units) 1 Capsule(s) Oral three times a day with meals  Parenteral Nutrition - Adult 1 Each (83 mL/Hr) TPN Continuous <Continuous>  Parenteral Nutrition - Adult 1 Each (83 mL/Hr) TPN Continuous <Continuous>  ursodiol Capsule 300 milliGRAM(s) Oral every 8 hours  venlafaxine XR. 300 milliGRAM(s) Oral every 24 hours    MEDICATIONS  (PRN):  albuterol/ipratropium for Nebulization 3 milliLiter(s) Nebulizer every 6 hours PRN Shortness of Breath and/or Wheezing  dextrose Oral Gel 15 Gram(s) Oral once PRN Blood Glucose LESS THAN 70 milliGRAM(s)/deciliter  LORazepam   Injectable 0.25 milliGRAM(s) IV Push every 24 hours PRN Anxiety  melatonin 5 milliGRAM(s) Oral at bedtime PRN Insomnia      ALLERGIES:  Allergies    penicillin (Angioedema)    Intolerances        LABS:                        7.9    8.25  )-----------( 107      ( 30 May 2024 05:30 )             25.2     05-30    134<L>  |  104  |  55<H>  ----------------------------<  125<H>  4.3   |  24  |  1.37<H>    Ca    7.8<L>      30 May 2024 05:30  Phos  3.5     05-30  Mg     2.2     05-30    TPro  6.9  /  Alb  1.9<L>  /  TBili  9.2<H>  /  DBili  x   /  AST  360<H>  /  ALT  119<H>  /  AlkPhos  101  05-30      Urinalysis Basic - ( 30 May 2024 05:30 )    Color: x / Appearance: x / SG: x / pH: x  Gluc: 125 mg/dL / Ketone: x  / Bili: x / Urobili: x   Blood: x / Protein: x / Nitrite: x   Leuk Esterase: x / RBC: x / WBC x   Sq Epi: x / Non Sq Epi: x / Bacteria: x      CAPILLARY BLOOD GLUCOSE      POCT Blood Glucose.: 130 mg/dL (31 May 2024 11:18)      RADIOLOGY & ADDITIONAL TESTS: Reviewed. OVERNIGHT EVENTS: was feeling SOB, asked for Lasix, felt better after     SUBJECTIVE / INTERVAL HPI: Patient seen and examined at bedside.     VITAL SIGNS:  Vital Signs Last 24 Hrs  T(C): 36.5 (31 May 2024 10:00), Max: 36.9 (30 May 2024 14:18)  T(F): 97.7 (31 May 2024 10:00), Max: 98.5 (30 May 2024 14:18)  HR: 86 (31 May 2024 11:56) (72 - 88)  BP: 128/60 (31 May 2024 11:56) (102/60 - 131/57)  BP(mean): 86 (31 May 2024 11:56) (72 - 86)  RR: 20 (31 May 2024 11:56) (18 - 25)  SpO2: 93% (31 May 2024 11:56) (93% - 100%)    Parameters below as of 31 May 2024 11:56  Patient On (Oxygen Delivery Method): room air        PHYSICAL EXAM:    General: alert, in no acute distress  HEENT: NC/AT; PERRL, anicteric sclera; MMM  Neck: supple, no JVP  Cardiovascular: +S1/S2, RRR  Respiratory: CTA B/L; no W/R/R  Gastrointestinal: soft, right sided stoma clear, large left non bleeding fistula with major wound dehiscence with serous discharge   Extremities: WWP; +3 pitting edema, B/L, PICC line in LUE intact   Vascular: 2+ radial, DP/PT pulses B/L  Neurological: AAOx3; no focal deficits    MEDICATIONS:  MEDICATIONS  (STANDING):  allopurinol 300 milliGRAM(s) Oral every 24 hours  atorvastatin 20 milliGRAM(s) Oral at bedtime  carvedilol 3.125 milliGRAM(s) Oral every 12 hours  ceFAZolin   IVPB 2000 milliGRAM(s) IV Intermittent every 8 hours  cholestyramine Powder (Sugar-Free) 4 Gram(s) Oral every 24 hours  dextrose 10% Bolus 125 milliLiter(s) IV Bolus once  dextrose 5%. 1000 milliLiter(s) (100 mL/Hr) IV Continuous <Continuous>  dextrose 5%. 1000 milliLiter(s) (50 mL/Hr) IV Continuous <Continuous>  dextrose 50% Injectable 25 Gram(s) IV Push once  dextrose 50% Injectable 12.5 Gram(s) IV Push once  ergocalciferol 42658 Unit(s) Oral <User Schedule>  furosemide   Injectable 40 milliGRAM(s) IV Push once  glucagon  Injectable 1 milliGRAM(s) IntraMuscular once  hydrocortisone Enema 100 milliGRAM(s) Rectal every 12 hours  insulin lispro (ADMELOG) corrective regimen sliding scale   SubCutaneous at bedtime  insulin lispro (ADMELOG) corrective regimen sliding scale   SubCutaneous three times a day before meals  levothyroxine 50 MICROGram(s) Oral every 24 hours  octreotide  Infusion 50 MICROgram(s)/Hr (10 mL/Hr) IV Continuous <Continuous>  Omegaven 10g/100mL 60 Gram(s),OMEGAVEN 60GRAMS/600ML 60 Gram(s) 60 Gram(s) (50 mL/Hr) IV Continuous <Continuous>  pancrelipase  (CREON 24,000 Lipase Units) 1 Capsule(s) Oral three times a day with meals  Parenteral Nutrition - Adult 1 Each (83 mL/Hr) TPN Continuous <Continuous>  Parenteral Nutrition - Adult 1 Each (83 mL/Hr) TPN Continuous <Continuous>  ursodiol Capsule 300 milliGRAM(s) Oral every 8 hours  venlafaxine XR. 300 milliGRAM(s) Oral every 24 hours    MEDICATIONS  (PRN):  albuterol/ipratropium for Nebulization 3 milliLiter(s) Nebulizer every 6 hours PRN Shortness of Breath and/or Wheezing  dextrose Oral Gel 15 Gram(s) Oral once PRN Blood Glucose LESS THAN 70 milliGRAM(s)/deciliter  LORazepam   Injectable 0.25 milliGRAM(s) IV Push every 24 hours PRN Anxiety  melatonin 5 milliGRAM(s) Oral at bedtime PRN Insomnia      ALLERGIES:  Allergies    penicillin (Angioedema)    Intolerances        LABS:                        7.9    8.25  )-----------( 107      ( 30 May 2024 05:30 )             25.2     05-30    134<L>  |  104  |  55<H>  ----------------------------<  125<H>  4.3   |  24  |  1.37<H>    Ca    7.8<L>      30 May 2024 05:30  Phos  3.5     05-30  Mg     2.2     05-30    TPro  6.9  /  Alb  1.9<L>  /  TBili  9.2<H>  /  DBili  x   /  AST  360<H>  /  ALT  119<H>  /  AlkPhos  101  05-30      Urinalysis Basic - ( 30 May 2024 05:30 )    Color: x / Appearance: x / SG: x / pH: x  Gluc: 125 mg/dL / Ketone: x  / Bili: x / Urobili: x   Blood: x / Protein: x / Nitrite: x   Leuk Esterase: x / RBC: x / WBC x   Sq Epi: x / Non Sq Epi: x / Bacteria: x      CAPILLARY BLOOD GLUCOSE      POCT Blood Glucose.: 130 mg/dL (31 May 2024 11:18)      RADIOLOGY & ADDITIONAL TESTS: Reviewed.

## 2024-05-31 NOTE — PROGRESS NOTE ADULT - SUBJECTIVE AND OBJECTIVE BOX
S: Sleeping on AM rounds. No additional bleeding episodes since yesterday morning.     O: AFVSS. Reviewed in chart.     Exam:   General: Sleeping comfortably. Jaundiced  CV: HDS, RRR, warm  Pulm: On room air  Abd: EC fistula pouch with clear brown fluid in bag. No blood. Ostomy w/ minimal contents.     I/O / 24h  UOP 1.3L  Fistula 2L  Net -329 mL      LABS:  cret                        7.9    8.25  )-----------( 107      ( 30 May 2024 05:30 )             25.2     05-30    134<L>  |  104  |  55<H>  ----------------------------<  125<H>  4.3   |  24  |  1.37<H>    Ca    7.8<L>      30 May 2024 05:30  Phos  3.5     05-30  Mg     2.2     05-30    TPro  6.9  /  Alb  1.9<L>  /  TBili  9.2<H>  /  DBili  x   /  AST  360<H>  /  ALT  119<H>  /  AlkPhos  101  05-30    A/P: 78 year old male with past medical history of AFib/Flutter with numerous DCCVs in the past with prolonged admission last year for Crohns SBO s/p open TRE and SBR c/b abdominal wall dehiscence and development of enteroatmospheric fistula; prolonged hospital course significant for recurrent AKIs, cholestatic transaminitis s/p perc cony, prior DVTs, PNAs, fungemia, sinus pauses s/p pacemaker and recurrent bleeding from fistula most recently s/p IR embolization of L branch of inferior epigastric artery via R groin access (4/16), with multiple readmissions for continued bleeding from fistula and ostomy sites s/p ileoscopy on noting an ulcer w/ clot proximal to stoma w/ hemoclip was applied (5/1), re-admitted (5/5/24) for symptomatic anemia secondary to bleeding from fistula site and ileostomy sites s/p ileoscopy, noting erythematous patch of bowel just deep to the ECF, without any active bleeding (5/9), s/p IR angiography noting  several areas of hyperemia in the jejunum adjacent to the stoma however no evidence of active bleeding (5/9) transferred to [facility] for definitive operative management however canceled due to portal hypertension now transferred to [facility] for possible TIPS procedure. Afebrile, , normotensive, O2 sat 98% on RA. On exam, abdomen is soft, nondistended, nontender; RUQ stoma involuted with thin serous output without stigmata of bleeding; LUQ fistula with bilious drainage and large old clot, removed with persistent ooze from 6 o'clock position, packed with Surgicel powder and sheets. Labs show WBC 7.65, H/H 7.9/24.1, coags wnl.    Interval update: 1x bleeding episode yesterday morning controlled w/ pressure. Doing well.     Continue octreotide  Appreciate hepatology input.   Transfuse hgb <7  If evidence of re-bleeding, please reach out to general surgery team 5C  General surgery team 5C will continue to follow closely         S: Sleeping on AM rounds. No additional bleeding episodes since yesterday morning.     O: AFVSS. Reviewed in chart.     Exam:   General: Sleeping comfortably. Jaundiced  CV: HDS, RRR, warm  Pulm: On room air  Abd: EC fistula pouch with clear brown fluid in bag. No blood. Ostomy w/ minimal contents.     I/O / 24h  UOP 1.3L  Fistula 2L  Net -329 mL      LABS:  cret                        7.9    8.25  )-----------( 107      ( 30 May 2024 05:30 )             25.2     05-30    134<L>  |  104  |  55<H>  ----------------------------<  125<H>  4.3   |  24  |  1.37<H>    Ca    7.8<L>      30 May 2024 05:30  Phos  3.5     05-30  Mg     2.2     05-30    TPro  6.9  /  Alb  1.9<L>  /  TBili  9.2<H>  /  DBili  x   /  AST  360<H>  /  ALT  119<H>  /  AlkPhos  101  05-30    A/P: 78 year old male with past medical history of AFib/Flutter with numerous DCCVs in the past with prolonged admission last year for Crohns SBO s/p open TRE and SBR c/b abdominal wall dehiscence and development of enteroatmospheric fistula; prolonged hospital course significant for recurrent AKIs, cholestatic transaminitis s/p perc cony, prior DVTs, PNAs, fungemia, sinus pauses s/p pacemaker and recurrent bleeding from fistula most recently s/p IR embolization of L branch of inferior epigastric artery via R groin access (4/16), with multiple readmissions for continued bleeding from fistula and ostomy sites s/p ileoscopy on noting an ulcer w/ clot proximal to stoma w/ hemoclip was applied (5/1), re-admitted (5/5/24) for symptomatic anemia secondary to bleeding from fistula site and ileostomy sites s/p ileoscopy, noting erythematous patch of bowel just deep to the ECF, without any active bleeding (5/9), s/p IR angiography noting  several areas of hyperemia in the jejunum adjacent to the stoma however no evidence of active bleeding (5/9) transferred to [facility] for definitive operative management however canceled due to portal hypertension now transferred to [facility] for possible TIPS procedure. Afebrile, , normotensive, O2 sat 98% on RA. On exam, abdomen is soft, nondistended, nontender; RUQ stoma involuted with thin serous output without stigmata of bleeding; LUQ fistula with bilious drainage and large old clot, removed with persistent ooze from 6 o'clock position, packed with Surgicel powder and sheets. Labs show WBC 7.65, H/H 7.9/24.1, coags wnl.    Interval update: 1x bleeding episode yesterday morning controlled w/ pressure. Doing well. Titrating coreg.     Continue octreotide & coreg  Appreciate hepatology input.   Transfuse hgb <7  If evidence of re-bleeding, please reach out to general surgery team 5C  General surgery team 5C will continue to follow closely

## 2024-05-31 NOTE — PROGRESS NOTE ADULT - ASSESSMENT
78M w PMH AFib/Flutter s/p DCCVs, hypothyroidism, depression, Crohn's s/p remote ileocecectomy, prolonged hospital course (06/2023-02/2024) for SBO s/p SBR with ileostomy and enteroatmospheric fistula, short-bowel syndrome, cholestatic transaminitis s/p PCT (removed 02/2024), multiple UE DVTs, candida glabrata fungemia (treated), sinus pauses requiring PPM placement (02/2024), with multiple readmissions for symptomatic anemia 2/2 recurrent bleeding from fistula and ostomy sites, s/p IR embolization of branch of inferior epigastric artery (4/2024), and MSSA bacteremia (5/9/2024), transferred to [facility] on 5/20/24 for further surgical management, found to have portal HTN, transferred back to [facility] for admitted to telemetry for further management.

## 2024-05-31 NOTE — PROGRESS NOTE ADULT - SUBJECTIVE AND OBJECTIVE BOX
Hepatology Consult Progress Note:     OVERNIGHT EVENTS: AMRITA.    SUBJECTIVE / INTERVAL HPI:   Patient seen and examined at bedside. States that overall he feels well. Denies any acute complaints at this time. Tolerating Coreg well. Has not any further episodes of bleeding.       VITAL SIGNS:  Vital Signs Last 24 Hrs  T(C): 36.5 (31 May 2024 10:00), Max: 37.2 (30 May 2024 10:46)  T(F): 97.7 (31 May 2024 10:00), Max: 99 (30 May 2024 10:46)  HR: 88 (31 May 2024 09:46) (72 - 88)  BP: 127/58 (31 May 2024 09:46) (102/60 - 131/57)  BP(mean): 84 (31 May 2024 09:46) (72 - 84)  RR: 20 (31 May 2024 09:46) (17 - 25)  SpO2: 100% (31 May 2024 09:46) (92% - 100%)    Parameters below as of 31 May 2024 09:46  Patient On (Oxygen Delivery Method): room air        05-30-24 @ 07:01  -  05-31-24 @ 07:00  --------------------------------------------------------  IN: 3071 mL / OUT: 3400 mL / NET: -329 mL      PHYSICAL EXAM:  General: No acute distress, mildly jaundiced   Lungs: Normal respiratory effort and no intercostal retractions  Cardiovascular: RRR  Abdomen: Soft, non-tender, non-distended, +ileostomy, +fistula site covered by ostomy bag   Neurological: Alert and oriented x3  Skin: Warm and dry. No obvious rash      MEDICATIONS:  MEDICATIONS  (STANDING):  allopurinol 300 milliGRAM(s) Oral every 24 hours  atorvastatin 20 milliGRAM(s) Oral at bedtime  carvedilol 3.125 milliGRAM(s) Oral every 12 hours  ceFAZolin   IVPB 2000 milliGRAM(s) IV Intermittent every 8 hours  cholestyramine Powder (Sugar-Free) 4 Gram(s) Oral every 24 hours  dextrose 10% Bolus 125 milliLiter(s) IV Bolus once  dextrose 5%. 1000 milliLiter(s) (100 mL/Hr) IV Continuous <Continuous>  dextrose 5%. 1000 milliLiter(s) (50 mL/Hr) IV Continuous <Continuous>  dextrose 50% Injectable 25 Gram(s) IV Push once  dextrose 50% Injectable 12.5 Gram(s) IV Push once  ergocalciferol 15250 Unit(s) Oral <User Schedule>  glucagon  Injectable 1 milliGRAM(s) IntraMuscular once  hydrocortisone Enema 100 milliGRAM(s) Rectal every 12 hours  insulin lispro (ADMELOG) corrective regimen sliding scale   SubCutaneous at bedtime  insulin lispro (ADMELOG) corrective regimen sliding scale   SubCutaneous three times a day before meals  levothyroxine 50 MICROGram(s) Oral every 24 hours  octreotide  Infusion 50 MICROgram(s)/Hr (10 mL/Hr) IV Continuous <Continuous>  ondansetron Injectable 4 milliGRAM(s) IV Push once  pancrelipase  (CREON 24,000 Lipase Units) 1 Capsule(s) Oral three times a day with meals  Parenteral Nutrition - Adult 1 Each (83 mL/Hr) TPN Continuous <Continuous>  ursodiol Capsule 300 milliGRAM(s) Oral every 8 hours  venlafaxine XR. 300 milliGRAM(s) Oral every 24 hours    MEDICATIONS  (PRN):  albuterol/ipratropium for Nebulization 3 milliLiter(s) Nebulizer every 6 hours PRN Shortness of Breath and/or Wheezing  dextrose Oral Gel 15 Gram(s) Oral once PRN Blood Glucose LESS THAN 70 milliGRAM(s)/deciliter  LORazepam   Injectable 0.25 milliGRAM(s) IV Push every 24 hours PRN Anxiety  melatonin 5 milliGRAM(s) Oral at bedtime PRN Insomnia      ALLERGIES:  Allergies    penicillin (Angioedema)    Intolerances        LABS:                        7.9    8.25  )-----------( 107      ( 30 May 2024 05:30 )             25.2     05-30    134<L>  |  104  |  55<H>  ----------------------------<  125<H>  4.3   |  24  |  1.37<H>    Ca    7.8<L>      30 May 2024 05:30  Phos  3.5     05-30  Mg     2.2     05-30    TPro  6.9  /  Alb  1.9<L>  /  TBili  9.2<H>  /  DBili  x   /  AST  360<H>  /  ALT  119<H>  /  AlkPhos  101  05-30      Urinalysis Basic - ( 30 May 2024 05:30 )    Color: x / Appearance: x / SG: x / pH: x  Gluc: 125 mg/dL / Ketone: x  / Bili: x / Urobili: x   Blood: x / Protein: x / Nitrite: x   Leuk Esterase: x / RBC: x / WBC x   Sq Epi: x / Non Sq Epi: x / Bacteria: x      CAPILLARY BLOOD GLUCOSE      POCT Blood Glucose.: 129 mg/dL (31 May 2024 06:13)        RADIOLOGY & ADDITIONAL TESTS: Reviewed.

## 2024-05-31 NOTE — PROGRESS NOTE ADULT - ATTENDING COMMENTS
Crohn's with ileocectomy and ileostomy after SBR, enteroatmospheric fistula, MSSA bacteremia, portal HTN, PV thrombosis  physical as above  continue carvedilol and octreotide  continue ancef through 6/10  TPN written  no anticoagulation  dose lasix today and follow BUN and creatinine for edema  give one dose of 25% albumin

## 2024-05-31 NOTE — PROGRESS NOTE ADULT - PROBLEM SELECTOR PROBLEM 8
Patient states he can not collect urine sample at this time  
HLD (hyperlipidemia)

## 2024-06-01 NOTE — CONSULT NOTE ADULT - ASSESSMENT
78yMale with past medical history of AFib/Flutter with numerous DCCVs in the past with prolonged admission from 6/23/23 till 2/12/24 for Crohns SBO s/p open TRE and 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 Prolonged hospital course significant for recurrent AKIs, cholestatic transaminitis s/p perc cony, prior DVTs, PNAs, fungemia, sinus pauses s/p pacemaker, return to [facility] multiple times after discharge in Feb2024 for recurrent bleeding from ileostomy, fistula and wound bed, most recently s/p IR embolization of L branch of inferior epigastric artery via R groin access (4/16), S/p ileoscopy, found an ulcer w/ clot proximal to stoma w/ hemoclip applied (5/1). most recently transferred to SICU 5/8 overnight for 2L blood loss from fistula site with subsequent hypotension. s/p ileoscopy, noting erythematous patch of bowel just deep to the ECF, without any active bleeding (5/9). S/p IR angiography noting several areas of hyperemia in the jejunum adjacent to the stoma however no evidence of active bleeding (5/9), also noted MSSA bacteremia (5/9) likely due to line sepsis/PICC infection, started on Ancef, portal vein thrombosis per US from 5/13/24, but not candidate for anticoagulation due to persistent bleeding. Ultimately transferred from [facility] to [facility] 5/20 for possible surgical intervention for persist bleeding, but deemed not candidate for surgery due to portal hypertension. pt readmitted to [facility] Medicine tele on 5/29 for fistula wound bed bleeding. s/p PRBC transfusion. Pt now transferred from [facility] to SICU for persistent fistula wound bed bleeding with acute blood loss anemia for close management.     Neuro: Pain: Dilaudid PRN. Hx: Anxiety: Continue Effexor and Ativan PRN. Ambien on hold. Nausea: Zofran PRN.  CV: maintaining MAPs > 65 without pressors. Hx AFib: cont coreg, no AC given recurrent unprovoked bleed. Hx HLD: cont Atorvastatin.   Pulm: chronic Pleural Effusions b/l, no resp distress on Room air, nightly BIPAP at 40%. POCUS as needed.   GI: persistent elevated T bili s/p Hepatology consults and workup in past, US with portal vein thrombosis 5/13, but unable to anticoagulate due to recurrent bleeding. Cont cholestyramine. C/w Ursodiol. Cont Creon. cont octreotide, Crohns: cont hydrocortisone enemas via ileostomy, Continue TPN, smofflipids on Monday, Omegaven on Tues to Sunday.  holding off on remicade in setting of MSSA bacteremia. Dr Zhao consulted on interventional options for portal htn.   : Voids.   ID: MSSA Bacteremia MSSA from BCx (5/9), TTE negative for vegetation. 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 ID following. Pt with hx of + blood cx's with candida glabrata from 11/24/23. Cont Ancef (5/10-6/10), total of 4 week course in setting of hardware as per ID. // PREVIOUS: Cefepime (5/10), Vanc x1 (5/9)  Endo: mISSc. Hx: Hypothyroidism. Continue synthroid.   PPx: SCDs, SQH on hold for bleeding.   Heme: Anemia due to EC fistula bleed. Received multiple PRBCs, FFP, plt (last plt transfusion was 5/17 night), cryo this admission. Heme consulted. Trend CBC. 5 of Vit k removed from TPN on 5/14. C/w monitoring plt levels. Intermittent wound bed bleed, received 1U PRBC last night and this morning.   Wounds: Wound manager present with dressing changes done by Wound care team. Bacitracin to rt arm skin tear  Lines: L. PICC (5/17--), Remove RIJ TLC (5/9-5/17), L EJ PIV x1(5/8--),   PT: Ordered OOBTC daily.   Dispo: SICU, pending transfer to [facility] intestinal rehab [place]     78yMale with past medical history of AFib/Flutter with numerous DCCVs in the past with prolonged admission from 6/23/23 till 2/12/24 for Crohns SBO s/p open TRE and 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 Prolonged hospital course significant for recurrent AKIs, cholestatic transaminitis s/p perc cony, prior DVTs, PNAs, fungemia, sinus pauses s/p pacemaker, return to [facility] multiple times after discharge in Feb2024 for recurrent bleeding from ileostomy, fistula and wound bed, most recently s/p IR embolization of L branch of inferior epigastric artery via R groin access (4/16), S/p ileoscopy, found an ulcer w/ clot proximal to stoma w/ hemoclip applied (5/1). most recently transferred to SICU 5/8 overnight for 2L blood loss from fistula site with subsequent hypotension. s/p ileoscopy, noting erythematous patch of bowel just deep to the ECF, without any active bleeding (5/9). S/p IR angiography noting several areas of hyperemia in the jejunum adjacent to the stoma however no evidence of active bleeding (5/9), also noted MSSA bacteremia (5/9) likely due to line sepsis/PICC infection, started on Ancef, portal vein thrombosis per US from 5/13/24, but not candidate for anticoagulation due to persistent bleeding. Ultimately transferred from [facility] to [facility] 5/20 for possible surgical intervention for persist bleeding, but deemed not candidate for surgery due to portal hypertension. pt readmitted to [facility] Medicine tele on 5/29 for fistula wound bed bleeding. s/p PRBC transfusion. Pt now transferred from [facility] to SICU for persistent fistula wound bed bleeding with acute blood loss anemia for close management.     Neuro: Pain: Dilaudid PRN. Hx: Anxiety: Continue Effexor and Ativan PRN. Ambien on hold. Nausea: Zofran PRN.  CV: maintaining MAPs > 65 without pressors. Hx AFib: cont coreg, no AC given recurrent unprovoked bleed. Hx HLD: cont Atorvastatin.   Pulm: chronic Pleural Effusions b/l, no resp distress on Room air, nightly BIPAP at 40%. POCUS as needed.   GI: persistent elevated T bili s/p Hepatology consults and workup in past, US with portal vein thrombosis 5/13, but unable to anticoagulate due to recurrent bleeding. Cont cholestyramine. C/w Ursodiol. Cont Creon. cont octreotide, Crohns: cont hydrocortisone enemas via ileostomy, Continue TPN, smofflipids on Monday, Omegaven on Tues to Sunday.  holding off on remicade in setting of MSSA bacteremia. Dr Zhao consulted on interventional options for portal htn.   : Voids.   ID: MSSA Bacteremia MSSA from BCx (5/9), TTE negative for vegetation. 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 // PREVIOUS: Cefepime (5/10), Vanc x1 (5/9)  Endo: mISSc. Hx: Hypothyroidism. Continue synthroid.   PPx: SCDs, SQH on hold for bleeding.   Heme: acute blood loss anemia, likely due to EC fistula area bleed. Received multiple PRBCs, FFP, plt and cryo in recent admission. seen by Leticia in recent admission. Hgb 6/8 this morning with bleeding from fistula wound bed area. transfuse 1u PRBC.   Wounds: Wound manager managed by Wound care team.   Lines: double lumen L PICC (5/17--),   PT: OOBTC daily.   Dispo: SICU   78yMale with past medical history of AFib/Flutter with numerous DCCVs in the past with prolonged admission from 6/23/23 till 2/12/24 for Crohns SBO s/p open TRE and 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 Prolonged hospital course significant for recurrent AKIs, cholestatic transaminitis s/p perc cony, prior DVTs, PNAs, fungemia, sinus pauses s/p pacemaker, return to [facility] multiple times after discharge in Feb2024 for recurrent bleeding from ileostomy, fistula and wound bed, most recently s/p IR embolization of L branch of inferior epigastric artery via R groin access (4/16), S/p ileoscopy, found an ulcer w/ clot proximal to stoma w/ hemoclip applied (5/1). most recently transferred to SICU 5/8 overnight for 2L blood loss from fistula site with subsequent hypotension. s/p ileoscopy, noting erythematous patch of bowel just deep to the ECF, without any active bleeding (5/9). S/p IR angiography noting several areas of hyperemia in the jejunum adjacent to the stoma however no evidence of active bleeding (5/9), also noted MSSA bacteremia (5/9) likely due to line sepsis/PICC infection, started on Ancef, portal vein thrombosis per US from 5/13/24, but not candidate for anticoagulation due to persistent bleeding. Ultimately transferred from [facility] to [facility] 5/20 for possible surgical intervention for persist bleeding, but deemed not candidate for surgery due to portal hypertension. pt readmitted to [facility] Medicine tele on 5/29 for fistula wound bed bleeding. s/p PRBC transfusion. Pt now transferred from [facility] to SICU for persistent fistula wound bed bleeding with acute blood loss anemia for close management.     Neuro: Pain: Dilaudid PRN. Hx: Anxiety: Continue Effexor and Ativan PRN. Ambien on hold. Nausea: Zofran PRN.  CV: maintaining MAPs > 65 without pressors. Hx AFib: cont coreg, no AC given recurrent unprovoked bleed. Hx HLD: cont Atorvastatin.   Pulm: chronic Pleural Effusions b/l, no resp distress on Room air, nightly BIPAP at 40%. POCUS as needed.   GI: persistent elevated T bili s/p Hepatology consults and workup in past, US with portal vein thrombosis 5/13, but unable to anticoagulate due to recurrent bleeding. Cont cholestyramine. C/w Ursodiol. Cont Creon. cont octreotide, Crohns: cont hydrocortisone enemas via ileostomy, Continue TPN, smofflipids on Monday, Omegaven on Tues to Sunday.  holding off on remicade in setting of MSSA bacteremia. Dr Zhao consulted on interventional options for portal htn.   : Voids.   ID: MSSA Bacteremia MSSA from BCx (5/9), TTE negative for vegetation. 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 // PREVIOUS: Cefepime (5/10), Vanc x1 (5/9)  Endo: mISSc. Hx: Hypothyroidism. Continue synthroid. insulin in TPN, watch fingerstick.   PPx: SCDs, SQH on hold for bleeding.   Heme: acute blood loss anemia, likely due to EC fistula area bleed. Received multiple PRBCs, FFP, plt and cryo in recent admission. seen by Heme in recent admission. Hgb 6/8 this morning with bleeding from fistula wound bed area. transfuse 1u PRBC.   Wounds: Wound manager managed by Wound care team.   Lines: double lumen L PICC (5/17--),   PT: OOBTC daily.   Dispo: SICU   78yMale with past medical history of AFib/Flutter with numerous DCCVs in the past with prolonged admission from 6/23/23 till 2/12/24 for Crohns SBO s/p open TRE and 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 Prolonged hospital course significant for recurrent AKIs, cholestatic transaminitis s/p perc cony, prior DVTs, PNAs, fungemia, sinus pauses s/p pacemaker, return to [facility] multiple times after discharge in Feb2024 for recurrent bleeding from ileostomy, fistula and wound bed, most recently s/p IR embolization of L branch of inferior epigastric artery via R groin access (4/16), S/p ileoscopy, found an ulcer w/ clot proximal to stoma w/ hemoclip applied (5/1). most recently transferred to SICU 5/8 overnight for 2L blood loss from fistula site with subsequent hypotension. s/p ileoscopy, noting erythematous patch of bowel just deep to the ECF, without any active bleeding (5/9). S/p IR angiography noting several areas of hyperemia in the jejunum adjacent to the stoma however no evidence of active bleeding (5/9), also noted MSSA bacteremia (5/9) likely due to line sepsis/PICC infection, started on Ancef, portal vein thrombosis per US from 5/13/24, but not candidate for anticoagulation due to persistent bleeding. Ultimately transferred from [facility] to [facility] 5/20 for possible surgical intervention for persist bleeding, but deemed not candidate for surgery due to portal hypertension. pt readmitted to [facility] Medicine tele on 5/29 for fistula wound bed bleeding. s/p PRBC transfusion. Pt now transferred from [facility] to SICU for persistent fistula wound bed bleeding with acute blood loss anemia for close management.     Neuro: Pain: Dilaudid PRN. Hx: Anxiety: Continue Effexor and Ativan PRN. Ambien on hold. Nausea: Zofran PRN.  CV: maintaining MAPs > 65 without pressors. Hx AFib: cont coreg, no AC given recurrent unprovoked bleed. Hx HLD: cont Atorvastatin.   Pulm: chronic Pleural Effusions b/l, no resp distress on Room air, nightly BIPAP at 40%. POCUS as needed.   GI: persistent elevated T bili s/p Hepatology consults and workup in past, US with portal vein thrombosis 5/13, but unable to anticoagulate due to recurrent bleeding. Cont cholestyramine. C/w Ursodiol. Cont Creon. cont octreotide, Crohns: cont hydrocortisone enemas via ileostomy, Continue TPN, smofflipids on Monday, Omegaven on Tues to Sunday.  holding off on remicade in setting of MSSA bacteremia. Dr Zhao consulted on interventional options for portal htn.   : Voids.   ID: MSSA Bacteremia MSSA from BCx (5/9), TTE negative for vegetation. 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 // PREVIOUS: Cefepime (5/10), Vanc x1 (5/9)  Endo: mISSc. Hx: Hypothyroidism. Continue synthroid. insulin in TPN, watch fingerstick.   PPx: SCDs, SQH on hold for bleeding.   Heme: acute blood loss anemia, likely due to EC fistula area bleed. Received multiple PRBCs, FFP, plt and cryo in recent admission. seen by Heme in recent admission. Hgb 6.8 this morning with bleeding from fistula wound bed area. transfuse 1u PRBC.   Wounds: Wound manager managed by Wound care team.   Lines: double lumen L PICC (5/17--),   PT: OOBTC daily.   Dispo: SICU

## 2024-06-01 NOTE — PROVIDER CONTACT NOTE (CRITICAL VALUE NOTIFICATION) - DATE AND TIME:
01-Jun-2024 09:21 no loss of consciousness/no dizziness/no laceration/no neck tenderness/no difficulty bearing weight

## 2024-06-01 NOTE — CONSULT NOTE ADULT - NS ATTEND AMEND GEN_ALL_CORE FT
JOSEPH Pal has acted as my scribe. Pt well known to SICU team and admitted this AM from Med step down after bleeding of approx. 200 ml of bright red blood seen in ostomy bag and drop in Hb to 6.%. Pressure held on bleeding site and pt stopped and 2 units PRBC ordered. Continue TPN and continue to discuss with surgery/interventional plans to address recurrent bleeding as is being discussed. Monitor VS closely,

## 2024-06-01 NOTE — CONSULT NOTE ADULT - SUBJECTIVE AND OBJECTIVE BOX
HPI: 78yMale with past medical history of AFib/Flutter with numerous DCCVs in the past with prolonged admission from 6/23/23 till 2/12/24 for Crohns SBO s/p open TRE and 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 Prolonged hospital course significant for recurrent AKIs, cholestatic transaminitis s/p perc cony, prior DVTs, PNAs, fungemia, sinus pauses s/p pacemaker, return to [facility] multiple times after discharge in Feb2024 for recurrent bleeding from ileostomy, fistula and wound bed, most recently s/p IR embolization of L branch of inferior epigastric artery via R groin access (4/16), S/p ileoscopy, found an ulcer w/ clot proximal to stoma w/ hemoclip applied (5/1). most recently transferred to SICU 5/8 overnight for 2L blood loss from fistula site with subsequent hypotension. s/p ileoscopy, noting erythematous patch of bowel just deep to the ECF, without any active bleeding (5/9). S/p IR angiography noting several areas of hyperemia in the jejunum adjacent to the stoma however no evidence of active bleeding (5/9), also noted MSSA bacteremia (5/9) likely due to line sepsis/PICC infection, started on Ancef, portal vein thrombosis per US from 5/13/24, but not candidate for anticoagulation due to persistent bleeding. Ultimately transferred from [facility] to [facility] 5/20 for possible surgical intervention for persist bleeding, but deemed not candidate for surgery due to portal hypertension. pt readmitted to [facility] Medicine tele on 5/29 for fistula wound bed bleeding. s/p PRBC transfusion. Pt now transferred from [facility] to SICU for persistent fistula wound bed bleeding with acute blood loss anemia for close management.     PMH: as above.     MEDS:    Allergies    penicillin (Angioedema)    Intolerances        ICU Vital Signs Last 24 Hrs  T(F): 97.7 (06-01-24 @ 06:00), Max: 98.4 (05-31-24 @ 19:04)  HR: 74 (06-01-24 @ 11:00) (74 - 88)  BP: 126/63 (06-01-24 @ 11:00) (107/55 - 136/63)  BP(mean): 90 (06-01-24 @ 11:00) (78 - 90)  ABP: --  RR: 16 (06-01-24 @ 11:00) (16 - 23)  SpO2: 96% (06-01-24 @ 11:00) (93% - 100%)    PHYSICAL EXAM:   Neurological: AAOx3, CNII-XII intact,  strength 5/5 b/l  ENT: mucus membrane moist  Cardiovascular: RRR  Respiratory: CTA  Gastrointestinal: soft, NT, ND, BS+  Extremities: warm, no dependent edema  Vascular: no cyanosis/erythema  Skin: no rashes  MSK: no joint swelling.   LABS:    06-01    133<L>  |  103  |  57<H>  ----------------------------<  146<H>  4.7   |  23  |  1.44<H>    Ca    7.4<L>      01 Jun 2024 06:52  Phos  2.9     06-01  Mg     1.8     06-01    TPro  6.9  /  Alb  2.0<L>  /  TBili  8.2<H>  /  DBili  x   /  AST  416<H>  /  ALT  78<H>  /  AlkPhos  102  06-01  LIVER FUNCTIONS - ( 01 Jun 2024 06:52 )  Alb: 2.0 g/dL / Pro: 6.9 g/dL / ALK PHOS: 102 U/L / ALT: 78 U/L / AST: 416 U/L / GGT: x                               6.8    9.93  )-----------( 124      ( 01 Jun 2024 07:46 )             21.7   PT/INR - ( 01 Jun 2024 06:52 )   PT: 18.7 sec;   INR: 1.66          PTT - ( 01 Jun 2024 06:52 )  PTT:43.9 sec  Urinalysis Basic - ( 01 Jun 2024 06:52 )    Color: x / Appearance: x / SG: x / pH: x  Gluc: 146 mg/dL / Ketone: x  / Bili: x / Urobili: x   Blood: x / Protein: x / Nitrite: x   Leuk Esterase: x / RBC: x / WBC x   Sq Epi: x / Non Sq Epi: x / Bacteria: x    CAPILLARY BLOOD GLUCOSE      POCT Blood Glucose.: 144 mg/dL (01 Jun 2024 11:22)  POCT Blood Glucose.: 162 mg/dL (01 Jun 2024 05:58)  POCT Blood Glucose.: 158 mg/dL (31 May 2024 22:05)  POCT Blood Glucose.: 141 mg/dL (31 May 2024 17:46)    Poole:	  [ ] None	[ ] Daily Poole Order Placed	   Indication:	  [ ] Strict I and O's    [ ] Obstruction     [ ] Incontinence + Stage 3 or 4 Decubitus  Central Line:  [ ] None	   [ ]  Medication / TPN Administration     [ ] No Peripheral IV          HPI: 78yMale with past medical history of AFib/Flutter with numerous DCCVs in the past with prolonged admission from 6/23/23 till 2/12/24 for Crohns SBO s/p open TRE and 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 Prolonged hospital course significant for recurrent AKIs, cholestatic transaminitis s/p perc cony, prior DVTs, PNAs, fungemia, sinus pauses s/p pacemaker, return to [facility] multiple times after discharge in Feb2024 for recurrent bleeding from ileostomy, fistula and wound bed, most recently s/p IR embolization of L branch of inferior epigastric artery via R groin access (4/16), S/p ileoscopy, found an ulcer w/ clot proximal to stoma w/ hemoclip applied (5/1). most recently transferred to SICU 5/8 overnight for 2L blood loss from fistula site with subsequent hypotension. s/p ileoscopy, noting erythematous patch of bowel just deep to the ECF, without any active bleeding (5/9). S/p IR angiography noting several areas of hyperemia in the jejunum adjacent to the stoma however no evidence of active bleeding (5/9), also noted MSSA bacteremia (5/9) likely due to line sepsis/PICC infection, started on Ancef, portal vein thrombosis per US from 5/13/24, but not candidate for anticoagulation due to persistent bleeding. Ultimately transferred from [facility] to [facility] 5/20 for possible surgical intervention for persist bleeding, but deemed not candidate for surgery due to portal hypertension. pt readmitted to [facility] Medicine tele on 5/29 for fistula wound bed bleeding. s/p PRBC transfusion. Pt now transferred from [facility] to SICU for persistent fistula wound bed bleeding with acute blood loss anemia for close management.     PMH: as above.     MEDS:  MEDICATIONS  (STANDING):  allopurinol 300 milliGRAM(s) Oral every 24 hours  atorvastatin 20 milliGRAM(s) Oral at bedtime  carvedilol 6.25 milliGRAM(s) Oral every 12 hours  ceFAZolin   IVPB 2000 milliGRAM(s) IV Intermittent every 8 hours  cholestyramine Powder (Sugar-Free) 4 Gram(s) Oral every 24 hours  ergocalciferol 04956 Unit(s) Oral <User Schedule>  glucagon  Injectable 1 milliGRAM(s) IntraMuscular once  hydrocortisone Enema 100 milliGRAM(s) Rectal every 12 hours  insulin lispro (ADMELOG) corrective regimen sliding scale   SubCutaneous three times a day before meals  insulin lispro (ADMELOG) corrective regimen sliding scale   SubCutaneous at bedtime  levothyroxine 50 MICROGram(s) Oral every 24 hours  magnesium sulfate  IVPB 1 Gram(s) IV Intermittent once  octreotide  Infusion 50 MICROgram(s)/Hr (10 mL/Hr) IV Continuous <Continuous>  Omegaven 10G/100mL 60 Gram(s) 60 Gram(s) (50 mL/Hr) IV Continuous <Continuous>  Omegaven 10g/100mL 60 Gram(s),OMEGAVEN 60GRAMS/600ML 60 Gram(s) 60 Gram(s) (50 mL/Hr) IV Continuous <Continuous>  pancrelipase  (CREON 24,000 Lipase Units) 1 Capsule(s) Oral three times a day with meals  Parenteral Nutrition - Adult 1 Each (83 mL/Hr) TPN Continuous <Continuous>  Parenteral Nutrition - Adult 1 Each (83 mL/Hr) TPN Continuous <Continuous>  ursodiol Capsule 300 milliGRAM(s) Oral every 8 hours  venlafaxine XR. 300 milliGRAM(s) Oral every 24 hours    MEDICATIONS  (PRN):  albuterol/ipratropium for Nebulization 3 milliLiter(s) Nebulizer every 6 hours PRN Shortness of Breath and/or Wheezing  dextrose Oral Gel 15 Gram(s) Oral once PRN Blood Glucose LESS THAN 70 milliGRAM(s)/deciliter  LORazepam   Injectable 0.25 milliGRAM(s) IV Push every 24 hours PRN Anxiety  melatonin 5 milliGRAM(s) Oral at bedtime PRN Insomnia      Allergies    penicillin (Angioedema)          ICU Vital Signs Last 24 Hrs  T(F): 97.7 (06-01-24 @ 06:00), Max: 98.4 (05-31-24 @ 19:04)  HR: 74 (06-01-24 @ 11:00) (74 - 88)  BP: 126/63 (06-01-24 @ 11:00) (107/55 - 136/63)  BP(mean): 90 (06-01-24 @ 11:00) (78 - 90)  ABP: --  RR: 16 (06-01-24 @ 11:00) (16 - 23)  SpO2: 96% (06-01-24 @ 11:00) (93% - 100%)    PHYSICAL EXAM:   Neurological: AAOx3, CNII-XII intact,  strength 5/5 b/l  ENT: mucus membrane moist, Icterus (for past several months)  Cardiovascular: RRR  Respiratory: CTA  Gastrointestinal: soft, NT, ND, right side ileostomy no bleeding, draining scant yellowish liquid, left sided fistula had punctate area of slow ooze at 7 O'clock, bleeding stopped with manual pressure. no specific evidence of intraluminal bleeding.   Extremities: warm, +dependent edema  Vascular: no cyanosis/erythema  Skin: no rashes, Jaundice (for past several months).   MSK: no joint swelling.     LABS:    06-01    133<L>  |  103  |  57<H>  ----------------------------<  146<H>  4.7   |  23  |  1.44<H>    Ca    7.4<L>      01 Jun 2024 06:52  Phos  2.9     06-01  Mg     1.8     06-01    TPro  6.9  /  Alb  2.0<L>  /  TBili  8.2<H>  /  DBili  x   /  AST  416<H>  /  ALT  78<H>  /  AlkPhos  102  06-01  LIVER FUNCTIONS - ( 01 Jun 2024 06:52 )  Alb: 2.0 g/dL / Pro: 6.9 g/dL / ALK PHOS: 102 U/L / ALT: 78 U/L / AST: 416 U/L / GGT: x                               6.8    9.93  )-----------( 124      ( 01 Jun 2024 07:46 )             21.7   PT/INR - ( 01 Jun 2024 06:52 )   PT: 18.7 sec;   INR: 1.66          PTT - ( 01 Jun 2024 06:52 )  PTT:43.9 sec  Urinalysis Basic - ( 01 Jun 2024 06:52 )    Color: x / Appearance: x / SG: x / pH: x  Gluc: 146 mg/dL / Ketone: x  / Bili: x / Urobili: x   Blood: x / Protein: x / Nitrite: x   Leuk Esterase: x / RBC: x / WBC x   Sq Epi: x / Non Sq Epi: x / Bacteria: x    CAPILLARY BLOOD GLUCOSE      POCT Blood Glucose.: 144 mg/dL (01 Jun 2024 11:22)  POCT Blood Glucose.: 162 mg/dL (01 Jun 2024 05:58)  POCT Blood Glucose.: 158 mg/dL (31 May 2024 22:05)  POCT Blood Glucose.: 141 mg/dL (31 May 2024 17:46)    Poole:	  [ ] None	[ ] Daily Poole Order Placed	   Indication:	  [ ] Strict I and O's    [ ] Obstruction     [ ] Incontinence + Stage 3 or 4 Decubitus  Central Line:  [ ] None	   [ ]  Medication / TPN Administration     [ ] No Peripheral IV          HPI: 78yMale with past medical history of AFib/Flutter with numerous DCCVs in the past with prolonged admission from 6/23/23 till 2/12/24 for Crohns SBO s/p open TRE and 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 Prolonged hospital course significant for recurrent AKIs, cholestatic transaminitis s/p perc cony, prior DVTs, PNAs, fungemia, sinus pauses s/p pacemaker, return to [facility] multiple times after discharge in Feb2024 for recurrent bleeding from ileostomy, fistula and wound bed, most recently s/p IR embolization of L branch of inferior epigastric artery via R groin access (4/16), S/p ileoscopy, found an ulcer w/ clot proximal to stoma w/ hemoclip applied (5/1). most recently transferred to SICU 5/8 overnight for 2L blood loss from fistula site with subsequent hypotension. s/p ileoscopy, noting erythematous patch of bowel just deep to the ECF, without any active bleeding (5/9). S/p IR angiography noting several areas of hyperemia in the jejunum adjacent to the stoma however no evidence of active bleeding (5/9), also noted MSSA bacteremia (5/9) likely due to line sepsis/PICC infection, started on Ancef, portal vein thrombosis per US from 5/13/24, but not candidate for anticoagulation due to persistent bleeding. Ultimately transferred from [facility] to [facility] 5/20 for possible surgical intervention for persist bleeding, but deemed not candidate for surgery due to portal hypertension. pt readmitted to [facility] Medicine tele on 5/29 for fistula wound bed bleeding. s/p PRBC transfusion. Pt now transferred from [facility] to SICU for persistent fistula wound bed bleeding with acute blood loss anemia for close management.     PMH: as above.     MEDS:  MEDICATIONS  (STANDING):  allopurinol 300 milliGRAM(s) Oral every 24 hours  atorvastatin 20 milliGRAM(s) Oral at bedtime  carvedilol 6.25 milliGRAM(s) Oral every 12 hours  ceFAZolin   IVPB 2000 milliGRAM(s) IV Intermittent every 8 hours  cholestyramine Powder (Sugar-Free) 4 Gram(s) Oral every 24 hours  ergocalciferol 90237 Unit(s) Oral <User Schedule>  glucagon  Injectable 1 milliGRAM(s) IntraMuscular once  hydrocortisone Enema 100 milliGRAM(s) Rectal every 12 hours  insulin lispro (ADMELOG) corrective regimen sliding scale   SubCutaneous three times a day before meals  insulin lispro (ADMELOG) corrective regimen sliding scale   SubCutaneous at bedtime  levothyroxine 50 MICROGram(s) Oral every 24 hours  magnesium sulfate  IVPB 1 Gram(s) IV Intermittent once  octreotide  Infusion 50 MICROgram(s)/Hr (10 mL/Hr) IV Continuous <Continuous>  Omegaven 10G/100mL 60 Gram(s) 60 Gram(s) (50 mL/Hr) IV Continuous <Continuous>  Omegaven 10g/100mL 60 Gram(s),OMEGAVEN 60GRAMS/600ML 60 Gram(s) 60 Gram(s) (50 mL/Hr) IV Continuous <Continuous>  pancrelipase  (CREON 24,000 Lipase Units) 1 Capsule(s) Oral three times a day with meals  Parenteral Nutrition - Adult 1 Each (83 mL/Hr) TPN Continuous <Continuous>  Parenteral Nutrition - Adult 1 Each (83 mL/Hr) TPN Continuous <Continuous>  ursodiol Capsule 300 milliGRAM(s) Oral every 8 hours  venlafaxine XR. 300 milliGRAM(s) Oral every 24 hours    MEDICATIONS  (PRN):  albuterol/ipratropium for Nebulization 3 milliLiter(s) Nebulizer every 6 hours PRN Shortness of Breath and/or Wheezing  dextrose Oral Gel 15 Gram(s) Oral once PRN Blood Glucose LESS THAN 70 milliGRAM(s)/deciliter  LORazepam   Injectable 0.25 milliGRAM(s) IV Push every 24 hours PRN Anxiety  melatonin 5 milliGRAM(s) Oral at bedtime PRN Insomnia      Allergies    penicillin (Angioedema)          ICU Vital Signs Last 24 Hrs  T(F): 97.7 (06-01-24 @ 06:00), Max: 98.4 (05-31-24 @ 19:04)  HR: 74 (06-01-24 @ 11:00) (74 - 88)  BP: 126/63 (06-01-24 @ 11:00) (107/55 - 136/63)  BP(mean): 90 (06-01-24 @ 11:00) (78 - 90)  ABP: --  RR: 16 (06-01-24 @ 11:00) (16 - 23)  SpO2: 96% (06-01-24 @ 11:00) (93% - 100%)    PHYSICAL EXAM:   Neurological: AAOx3, CNII-XII intact,  strength 5/5 b/l  ENT: mucus membrane moist, Icterus (for past several months)  Cardiovascular: RRR  Respiratory: CTA  Gastrointestinal: soft, NT, ND, right side ileostomy no bleeding, draining scant yellowish liquid, left sided fistula had punctate area of slow ooze at 7 O'clock, bleeding stopped with manual pressure. no specific evidence of intraluminal bleeding.   Extremities: warm, +dependent edema  Vascular: no cyanosis/erythema  Skin: no rashes, Jaundice (for past several months).   MSK: no joint swelling.     LABS:    06-01    133<L>  |  103  |  57<H>  ----------------------------<  146<H>  4.7   |  23  |  1.44<H>    Ca    7.4<L>      01 Jun 2024 06:52  Phos  2.9     06-01  Mg     1.8     06-01    TPro  6.9  /  Alb  2.0<L>  /  TBili  8.2<H>  /  DBili  x   /  AST  416<H>  /  ALT  78<H>  /  AlkPhos  102  06-01  LIVER FUNCTIONS - ( 01 Jun 2024 06:52 )  Alb: 2.0 g/dL / Pro: 6.9 g/dL / ALK PHOS: 102 U/L / ALT: 78 U/L / AST: 416 U/L / GGT: x                               6.8    9.93  )-----------( 124      ( 01 Jun 2024 07:46 )             21.7   PT/INR - ( 01 Jun 2024 06:52 )   PT: 18.7 sec;   INR: 1.66          PTT - ( 01 Jun 2024 06:52 )  PTT:43.9 sec  Urinalysis Basic - ( 01 Jun 2024 06:52 )    Color: x / Appearance: x / SG: x / pH: x  Gluc: 146 mg/dL / Ketone: x  / Bili: x / Urobili: x   Blood: x / Protein: x / Nitrite: x   Leuk Esterase: x / RBC: x / WBC x   Sq Epi: x / Non Sq Epi: x / Bacteria: x    CAPILLARY BLOOD GLUCOSE      POCT Blood Glucose.: 144 mg/dL (01 Jun 2024 11:22)  POCT Blood Glucose.: 162 mg/dL (01 Jun 2024 05:58)  POCT Blood Glucose.: 158 mg/dL (31 May 2024 22:05)  POCT Blood Glucose.: 141 mg/dL (31 May 2024 17:46)    Poole:	  [x ] None	[ ] Daily Poole Order Placed	   Indication:	  [ ] Strict I and O's    [ ] Obstruction     [ ] Incontinence + Stage 3 or 4 Decubitus  Central Line:  [ ] None	   [x ]  Medication / TPN Administration     [ ] No Peripheral IV

## 2024-06-02 NOTE — PROGRESS NOTE ADULT - ASSESSMENT
78yMale with past medical history of AFib/Flutter with numerous DCCVs in the past with prolonged admission from 6/23/23 till 2/12/24 for Crohns SBO s/p open TRE and 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 Prolonged hospital course significant for recurrent AKIs, cholestatic transaminitis s/p perc cony, prior DVTs, PNAs, fungemia, sinus pauses s/p pacemaker, return to [facility] multiple times after discharge in Feb2024 for recurrent bleeding from ileostomy, fistula and wound bed, most recently s/p IR embolization of L branch of inferior epigastric artery via R groin access (4/16), S/p ileoscopy, found an ulcer w/ clot proximal to stoma w/ hemoclip applied (5/1). most recently transferred to SICU 5/8 overnight for 2L blood loss from fistula site with subsequent hypotension. s/p ileoscopy, noting erythematous patch of bowel just deep to the ECF, without any active bleeding (5/9). S/p IR angiography noting several areas of hyperemia in the jejunum adjacent to the stoma however no evidence of active bleeding (5/9), also noted MSSA bacteremia (5/9) likely due to line sepsis/PICC infection, started on Ancef, portal vein thrombosis per US from 5/13/24, but not candidate for anticoagulation due to persistent bleeding. Ultimately transferred from [facility] to [facility] 5/20 for possible surgical intervention for persist bleeding, but deemed not candidate for surgery due to portal hypertension. pt readmitted to [facility] Medicine tele on 5/29 for fistula wound bed bleeding. s/p PRBC transfusion. Pt transferred from [facility] to SICU on 6/1 for persistent fistula wound bed bleeding with acute blood loss anemia for close management.     Neuro: Pain: Dilaudid PRN. Hx: Anxiety: Continue Effexor and Ativan PRN. Ambien on hold. Nausea: Zofran PRN.  CV: maintaining MAPs > 65 without pressors. Hx AFib: cont coreg, no AC given recurrent unprovoked bleed. Hx HLD: cont Atorvastatin. has dependent edema. will give albumin 25% for 48hrs.   Pulm: chronic Pleural Effusions b/l, no resp distress on Room air, nightly BIPAP at 40%. POCUS as needed.   GI: persistent elevated T bili s/p Hepatology consults and workup in past, US with portal vein thrombosis 5/13, but unable to anticoagulate due to recurrent bleeding. Cont cholestyramine. C/w Ursodiol. Cont Creon. cont octreotide (in TPN), Crohns: cont hydrocortisone enemas via ileostomy, Continue TPN, smofflipids on Monday, Omegaven on Tues to Sunday.  holding off on remicade in setting of MSSA bacteremia. Dr Zhao consulted on interventional options for portal htn.   : Voids. BUN/Cr up slightly, cont monitor.   ID: MSSA Bacteremia MSSA from BCx (5/9), TTE negative for vegetation. 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 // PREVIOUS: Cefepime (5/10), Vanc x1 (5/9)  Endo: mISSc. Hx: Hypothyroidism. Continue synthroid. insulin in TPN, watch fingerstick.   PPx: SCDs, SQH on hold for bleeding.   Heme: acute blood loss anemia, likely due to EC fistula area bleed. Received multiple PRBCs, FFP, plt and cryo in recent admission. seen by Leticia in recent admission. Hgb 7.2 this morning with bleeding from fistula wound bed area last night. transfuse 1u PRBC.   Wounds: Wound manager managed by Wound care team.   Lines: double lumen L PICC (5/17--),   PT: OOBTC daily.   Dispo: SICU

## 2024-06-02 NOTE — PROGRESS NOTE ADULT - SUBJECTIVE AND OBJECTIVE BOX
S: complained of SOB last night, requested lasix, says he felt much better after lasix.   had some bleeding to wound bed by fistula last night, stopped after surgicell.   No new issues/events overnight, no new med c/o    O: ICU Vital Signs Last 24 Hrs  T(F): 97.1 (06-02-24 @ 08:36), Max: 99.3 (06-01-24 @ 21:59)  HR: 63 (06-02-24 @ 08:00) (63 - 86)  BP: 117/56 (06-02-24 @ 08:00) (107/69 - 162/64)  BP(mean): 80 (06-02-24 @ 08:00) (76 - 92)  ABP: --  RR: 16 (06-02-24 @ 08:00) (14 - 21)  SpO2: 100% (06-02-24 @ 08:00) (94% - 100%)    PHYSICAL EXAM:   Neurological: AAOx3, CNII-XII intact,  strength 5/5 b/l  ENT: mucus membrane moist, Icterus (for past several months)  Cardiovascular: RRR  Respiratory: CTA  Gastrointestinal: soft, NT, ND, right side ileostomy no bleeding, draining scant yellowish liquid, left sided fistula, no active bleeding at this time. no specific evidence of intraluminal bleeding.   Extremities: warm, +dependent edema  Vascular: no cyanosis/erythema  Skin: no rashes, Jaundice (for past several months).   MSK: no joint swelling.       LABS:    06-02    137  |  105  |  59<H>  ----------------------------<  149<H>  4.7   |  24  |  1.57<H>    Ca    7.6<L>      02 Jun 2024 05:12  Phos  3.1     06-02  Mg     2.0     06-02    TPro  6.7  /  Alb  1.8<L>  /  TBili  9.2<H>  /  DBili  x   /  AST  409<H>  /  ALT  61<H>  /  AlkPhos  101  06-02  LIVER FUNCTIONS - ( 02 Jun 2024 05:12 )  Alb: 1.8 g/dL / Pro: 6.7 g/dL / ALK PHOS: 101 U/L / ALT: 61 U/L / AST: 409 U/L / GGT: x                               7.2    10.23 )-----------( 108      ( 02 Jun 2024 05:12 )             21.6   PT/INR - ( 02 Jun 2024 05:12 )   PT: 17.5 sec;   INR: 1.55          PTT - ( 02 Jun 2024 05:12 )  PTT:40.3 sec  Urinalysis Basic - ( 02 Jun 2024 05:12 )    Color: x / Appearance: x / SG: x / pH: x  Gluc: 149 mg/dL / Ketone: x  / Bili: x / Urobili: x   Blood: x / Protein: x / Nitrite: x   Leuk Esterase: x / RBC: x / WBC x   Sq Epi: x / Non Sq Epi: x / Bacteria: x    CAPILLARY BLOOD GLUCOSE      POCT Blood Glucose.: 157 mg/dL (01 Jun 2024 21:11)  POCT Blood Glucose.: 150 mg/dL (01 Jun 2024 17:09)  POCT Blood Glucose.: 144 mg/dL (01 Jun 2024 11:22)    MEDICATIONS  (STANDING):  albumin human 25% IVPB 50 milliLiter(s) IV Intermittent every 8 hours  allopurinol 300 milliGRAM(s) Oral every 24 hours  atorvastatin 20 milliGRAM(s) Oral at bedtime  carvedilol 6.25 milliGRAM(s) Oral every 12 hours  ceFAZolin   IVPB 2000 milliGRAM(s) IV Intermittent every 8 hours  cholestyramine Powder (Sugar-Free) 4 Gram(s) Oral every 24 hours  ergocalciferol 53218 Unit(s) Oral <User Schedule>  glucagon  Injectable 1 milliGRAM(s) IntraMuscular once  hydrocortisone Enema 100 milliGRAM(s) Rectal every 12 hours  insulin lispro (ADMELOG) corrective regimen sliding scale   SubCutaneous three times a day before meals  insulin lispro (ADMELOG) corrective regimen sliding scale   SubCutaneous at bedtime  levothyroxine 50 MICROGram(s) Oral every 24 hours  Omegaven 10G/100mL 60 Gram(s) 60 Gram(s) (50 mL/Hr) IV Continuous <Continuous>  Omegaven 10g/100mL 60 Gram(s),OMEGAVEN 60GRAMS/600ML 60 Gram(s) 60 Gram(s) (50 mL/Hr) IV Continuous <Continuous>  Omegaven 10grams/100 mL 60 Gram(s),IV Solution 600 milliLiter(s) 60 Gram(s) (50 mL/Hr) IV Continuous <Continuous>  pancrelipase  (CREON 24,000 Lipase Units) 1 Capsule(s) Oral three times a day with meals  Parenteral Nutrition - Adult 1 Each (83 mL/Hr) TPN Continuous <Continuous>  Parenteral Nutrition - Adult 1 Each (83 mL/Hr) TPN Continuous <Continuous>  ursodiol Capsule 300 milliGRAM(s) Oral every 8 hours  venlafaxine XR. 300 milliGRAM(s) Oral every 24 hours    MEDICATIONS  (PRN):  albuterol/ipratropium for Nebulization 3 milliLiter(s) Nebulizer every 6 hours PRN Shortness of Breath and/or Wheezing  dextrose Oral Gel 15 Gram(s) Oral once PRN Blood Glucose LESS THAN 70 milliGRAM(s)/deciliter  LORazepam   Injectable 0.25 milliGRAM(s) IV Push every 24 hours PRN Anxiety  melatonin 5 milliGRAM(s) Oral at bedtime PRN Insomnia      Poole:	  [ x] None	[ ] Daily Poole Order Placed	   Indication:	  [ ] Strict I and O's    [ ] Obstruction     [ ] Incontinence + Stage 3 or 4 Decubitus  Central Line:  [ ] None	   [ x]  Medication / TPN Administration     [ ] No Peripheral IV

## 2024-06-02 NOTE — PROGRESS NOTE ADULT - SUBJECTIVE AND OBJECTIVE BOX
SUBJECTIVE:  Patient was evaluated at bedside this AM by general surgery team. Patient is doing well this morning and has no complaints at this time.    MEDICATIONS  (STANDING):  albumin human 25% IVPB 50 milliLiter(s) IV Intermittent every 8 hours  allopurinol 300 milliGRAM(s) Oral every 24 hours  atorvastatin 20 milliGRAM(s) Oral at bedtime  carvedilol 6.25 milliGRAM(s) Oral every 12 hours  ceFAZolin   IVPB 2000 milliGRAM(s) IV Intermittent every 8 hours  cholestyramine Powder (Sugar-Free) 4 Gram(s) Oral every 24 hours  dextrose 10% Bolus 125 milliLiter(s) IV Bolus once  dextrose 5%. 1000 milliLiter(s) (100 mL/Hr) IV Continuous <Continuous>  dextrose 5%. 1000 milliLiter(s) (50 mL/Hr) IV Continuous <Continuous>  dextrose 50% Injectable 25 Gram(s) IV Push once  dextrose 50% Injectable 12.5 Gram(s) IV Push once  ergocalciferol 92710 Unit(s) Oral <User Schedule>  glucagon  Injectable 1 milliGRAM(s) IntraMuscular once  hydrocortisone Enema 100 milliGRAM(s) Rectal every 12 hours  insulin lispro (ADMELOG) corrective regimen sliding scale   SubCutaneous three times a day before meals  insulin lispro (ADMELOG) corrective regimen sliding scale   SubCutaneous at bedtime  levothyroxine 50 MICROGram(s) Oral every 24 hours  Omegaven 10G/100mL 60 Gram(s) 60 Gram(s) (50 mL/Hr) IV Continuous <Continuous>  Omegaven 10g/100mL 60 Gram(s),OMEGAVEN 60GRAMS/600ML 60 Gram(s) 60 Gram(s) (50 mL/Hr) IV Continuous <Continuous>  Omegaven 10grams/100 mL 60 Gram(s),IV Solution 600 milliLiter(s) 60 Gram(s) (50 mL/Hr) IV Continuous <Continuous>  pancrelipase  (CREON 24,000 Lipase Units) 1 Capsule(s) Oral three times a day with meals  Parenteral Nutrition - Adult 1 Each (83 mL/Hr) TPN Continuous <Continuous>  Parenteral Nutrition - Adult 1 Each (83 mL/Hr) TPN Continuous <Continuous>  ursodiol Capsule 300 milliGRAM(s) Oral every 8 hours  venlafaxine XR. 300 milliGRAM(s) Oral every 24 hours    MEDICATIONS  (PRN):  albuterol/ipratropium for Nebulization 3 milliLiter(s) Nebulizer every 6 hours PRN Shortness of Breath and/or Wheezing  dextrose Oral Gel 15 Gram(s) Oral once PRN Blood Glucose LESS THAN 70 milliGRAM(s)/deciliter  LORazepam   Injectable 0.25 milliGRAM(s) IV Push every 24 hours PRN Anxiety  melatonin 5 milliGRAM(s) Oral at bedtime PRN Insomnia      Vital Signs Last 24 Hrs  T(C): 36.1 (02 Jun 2024 13:00), Max: 37.4 (01 Jun 2024 21:59)  T(F): 97 (02 Jun 2024 13:00), Max: 99.3 (01 Jun 2024 21:59)  HR: 61 (02 Jun 2024 12:00) (61 - 86)  BP: 142/63 (02 Jun 2024 12:00) (107/69 - 162/64)  BP(mean): 91 (02 Jun 2024 12:00) (76 - 92)  RR: 16 (02 Jun 2024 12:00) (14 - 21)  SpO2: 100% (02 Jun 2024 12:00) (94% - 100%)    Parameters below as of 02 Jun 2024 12:00  Patient On (Oxygen Delivery Method): BiPAP/CPAP    O2 Concentration (%): 40    Physical Exam:  General: NAD, resting comfortably in bed  Pulmonary: Nonlabored breathing, no respiratory distress  Cardiovascular: NSR  Abdominal: soft, NT/ND, right side ileostomy no bleeding, draining scant yellowish liquid, left sided fistula, no active bleeding at this time. no specific evidence of intraluminal bleeding.   Extremities: WWP, normal strength  Neuro: A/O x 3, CNs II-XII grossly intact, no focal deficits, normal motor/sensation  Pulses: palpable distal pulses    I&O's Summary    01 Jun 2024 07:01  -  02 Jun 2024 07:00  --------------------------------------------------------  IN: 4053 mL / OUT: 3250 mL / NET: 803 mL        LABS:                        7.2    10.23 )-----------( 108      ( 02 Jun 2024 05:12 )             21.6     06-02    137  |  105  |  59<H>  ----------------------------<  149<H>  4.7   |  24  |  1.57<H>    Ca    7.6<L>      02 Jun 2024 05:12  Phos  3.1     06-02  Mg     2.0     06-02    TPro  6.7  /  Alb  1.8<L>  /  TBili  9.2<H>  /  DBili  x   /  AST  409<H>  /  ALT  61<H>  /  AlkPhos  101  06-02    PT/INR - ( 02 Jun 2024 05:12 )   PT: 17.5 sec;   INR: 1.55          PTT - ( 02 Jun 2024 05:12 )  PTT:40.3 sec  Urinalysis Basic - ( 02 Jun 2024 05:12 )    Color: x / Appearance: x / SG: x / pH: x  Gluc: 149 mg/dL / Ketone: x  / Bili: x / Urobili: x   Blood: x / Protein: x / Nitrite: x   Leuk Esterase: x / RBC: x / WBC x   Sq Epi: x / Non Sq Epi: x / Bacteria: x      CAPILLARY BLOOD GLUCOSE      POCT Blood Glucose.: 132 mg/dL (02 Jun 2024 13:28)  POCT Blood Glucose.: 157 mg/dL (01 Jun 2024 21:11)  POCT Blood Glucose.: 150 mg/dL (01 Jun 2024 17:09)    LIVER FUNCTIONS - ( 02 Jun 2024 05:12 )  Alb: 1.8 g/dL / Pro: 6.7 g/dL / ALK PHOS: 101 U/L / ALT: 61 U/L / AST: 409 U/L / GGT: x             RADIOLOGY & ADDITIONAL STUDIES:

## 2024-06-02 NOTE — PROGRESS NOTE ADULT - ASSESSMENT
78yMale with past medical history of AFib/Flutter with numerous DCCVs in the past with prolonged admission from 6/23/23 till 2/12/24 for Crohns SBO s/p open TRE and 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 Prolonged hospital course significant for recurrent AKIs, cholestatic transaminitis s/p perc cony, prior DVTs, PNAs, fungemia, sinus pauses s/p pacemaker, return to [facility] multiple times after discharge in Feb2024 for recurrent bleeding from ileostomy, fistula and wound bed, most recently s/p IR embolization of L branch of inferior epigastric artery via R groin access (4/16), S/p ileoscopy, found an ulcer w/ clot proximal to stoma w/ hemoclip applied (5/1). most recently transferred to SICU 5/8 overnight for 2L blood loss from fistula site with subsequent hypotension. s/p ileoscopy, noting erythematous patch of bowel just deep to the ECF, without any active bleeding (5/9). S/p IR angiography noting several areas of hyperemia in the jejunum adjacent to the stoma however no evidence of active bleeding (5/9), also noted MSSA bacteremia (5/9) likely due to line sepsis/PICC infection, started on Ancef, portal vein thrombosis per US from 5/13/24, but not candidate for anticoagulation due to persistent bleeding. Ultimately transferred from [facility] to [facility] 5/20 for possible surgical intervention for persist bleeding, but deemed not candidate for surgery due to portal hypertension. pt readmitted to [facility] Medicine tele on 5/29 for fistula wound bed bleeding. s/p PRBC transfusion. Pt transferred from [facility] to SICU on 6/1 for persistent fistula wound bed bleeding with acute blood loss anemia for close management.       Will receive 1u pRBCs this AM  Follow-up hematology recs  Gen Surg Team 5C will continue to follow

## 2024-06-02 NOTE — PROGRESS NOTE ADULT - ATTENDING COMMENTS
Minimal bleeding overnight. TPN reordered. Plan is NPO after midnight in case plan is for interventional to perform angio. Transfuse per surgery. JOSEPH Pal has acted as my scribe. Minimal bleeding overnight. TPN reordered. Plan is NPO after midnight in case plan is for interventional to perform angio. Transfuse per surgery.

## 2024-06-03 NOTE — PROGRESS NOTE ADULT - SUBJECTIVE AND OBJECTIVE BOX
Hepatology Consult Progress Note:     OVERNIGHT EVENTS: AMRITA.    SUBJECTIVE / INTERVAL HPI:   Patient seen and examined at bedside. Currently on BiPAP but appears comfortable. Had multiple episodes of bleeding from ostomy site over the weekend. Still on octreotide and Coreg.       VITAL SIGNS:  Vital Signs Last 24 Hrs  T(C): 36.5 (03 Jun 2024 09:00), Max: 37.2 (03 Jun 2024 00:44)  T(F): 97.7 (03 Jun 2024 09:00), Max: 98.9 (03 Jun 2024 00:44)  HR: 74 (03 Jun 2024 10:00) (61 - 90)  BP: 143/65 (03 Jun 2024 10:00) (113/53 - 157/64)  BP(mean): 94 (03 Jun 2024 10:00) (77 - 94)  RR: 28 (03 Jun 2024 10:00) (16 - 28)  SpO2: 70% (03 Jun 2024 10:00) (70% - 100%)    Parameters below as of 03 Jun 2024 10:00  Patient On (Oxygen Delivery Method): BiPAP/CPAP    O2 Concentration (%): 40    06-02-24 @ 07:01  -  06-03-24 @ 07:00  --------------------------------------------------------  IN: 2876 mL / OUT: 1380 mL / NET: 1496 mL    06-03-24 @ 07:01  -  06-03-24 @ 10:50  --------------------------------------------------------  IN: 249 mL / OUT: 250 mL / NET: -1 mL      PHYSICAL EXAM:  General: No acute distress, mildly jaundiced   Lungs: Normal respiratory effort and no intercostal retractions, comfortable on BiPAP  Cardiovascular: RRR  Abdomen: Soft, non-tender, non-distended, +ileostomy, +fistula site covered by ostomy bag   Neurological: Alert and oriented x3  Skin: Warm and dry. No obvious rash      MEDICATIONS:  MEDICATIONS  (STANDING):  albumin human 25% IVPB 50 milliLiter(s) IV Intermittent every 8 hours  allopurinol 300 milliGRAM(s) Oral every 24 hours  atorvastatin 20 milliGRAM(s) Oral at bedtime  carvedilol 6.25 milliGRAM(s) Oral every 12 hours  ceFAZolin   IVPB 2000 milliGRAM(s) IV Intermittent every 12 hours  chlorhexidine 2% Cloths 1 Application(s) Topical <User Schedule>  cholestyramine Powder (Sugar-Free) 4 Gram(s) Oral every 24 hours  dextrose 10% Bolus 125 milliLiter(s) IV Bolus once  dextrose 5%. 1000 milliLiter(s) (100 mL/Hr) IV Continuous <Continuous>  dextrose 5%. 1000 milliLiter(s) (50 mL/Hr) IV Continuous <Continuous>  dextrose 50% Injectable 25 Gram(s) IV Push once  dextrose 50% Injectable 12.5 Gram(s) IV Push once  ergocalciferol 15151 Unit(s) Oral <User Schedule>  glucagon  Injectable 1 milliGRAM(s) IntraMuscular once  hydrocortisone Enema 100 milliGRAM(s) Rectal every 12 hours  insulin lispro (ADMELOG) corrective regimen sliding scale   SubCutaneous three times a day before meals  insulin lispro (ADMELOG) corrective regimen sliding scale   SubCutaneous at bedtime  levothyroxine 50 MICROGram(s) Oral every 24 hours  lipid, fat emulsion (Fish Oil and Plant Based) 20% Infusion 0.505 Gm/kG/Day (20.8 mL/Hr) IV Continuous <Continuous>  Omegaven 10grams/100 mL 60 Gram(s),IV Solution 600 milliLiter(s) 60 Gram(s) (50 mL/Hr) IV Continuous <Continuous>  pancrelipase  (CREON 24,000 Lipase Units) 1 Capsule(s) Oral three times a day with meals  Parenteral Nutrition - Adult 1 Each (83 mL/Hr) TPN Continuous <Continuous>  Parenteral Nutrition - Adult 1 Each (83 mL/Hr) TPN Continuous <Continuous>  ursodiol Capsule 300 milliGRAM(s) Oral every 8 hours  venlafaxine XR. 300 milliGRAM(s) Oral every 24 hours    MEDICATIONS  (PRN):  albuterol/ipratropium for Nebulization 3 milliLiter(s) Nebulizer every 6 hours PRN Shortness of Breath and/or Wheezing  dextrose Oral Gel 15 Gram(s) Oral once PRN Blood Glucose LESS THAN 70 milliGRAM(s)/deciliter  LORazepam   Injectable 0.25 milliGRAM(s) IV Push every 24 hours PRN Anxiety  melatonin 5 milliGRAM(s) Oral at bedtime PRN Insomnia  ondansetron Injectable 4 milliGRAM(s) IV Push every 6 hours PRN Nausea and/or Vomiting      ALLERGIES:  Allergies  penicillin (Angioedema)  Intolerances      LABS:                        7.7    11.36 )-----------( 99       ( 03 Jun 2024 05:12 )             23.5     06-03    139  |  107  |  64<H>  ----------------------------<  148<H>  4.6   |  25  |  1.50<H>    Ca    8.3<L>      03 Jun 2024 05:12  Phos  2.8     06-03  Mg     2.2     06-03    TPro  7.1  /  Alb  2.4<L>  /  TBili  10.9<H>  /  DBili  x   /  AST  374<H>  /  ALT  53<H>  /  AlkPhos  98  06-03    PT/INR - ( 03 Jun 2024 05:12 )   PT: 18.0 sec;   INR: 1.60          PTT - ( 03 Jun 2024 05:12 )  PTT:40.5 sec  Urinalysis Basic - ( 03 Jun 2024 05:12 )    Color: x / Appearance: x / SG: x / pH: x  Gluc: 148 mg/dL / Ketone: x  / Bili: x / Urobili: x   Blood: x / Protein: x / Nitrite: x   Leuk Esterase: x / RBC: x / WBC x   Sq Epi: x / Non Sq Epi: x / Bacteria: x    CAPILLARY BLOOD GLUCOSE    POCT Blood Glucose.: 143 mg/dL (02 Jun 2024 21:45)  RADIOLOGY & ADDITIONAL TESTS: Reviewed.

## 2024-06-03 NOTE — PROGRESS NOTE ADULT - SUBJECTIVE AND OBJECTIVE BOX
ON: : Bleed from the ostomy site again at the 11 o'clock site approx 200ccs. Pressure held, redressed with surgicel. CBC sent, Hgb 8.3 (from 7.2). Cystatin C 2.65, GFR 20    SUBJECTIVE: Pt seen and examined at bedside this am by ICU team. Patient is mildly uncomfortable due to tachypnea, on BiPAP. Denies pain, has been NPO. Patient denies fever, nausea, vomiting, chest pain, and shortness of breath.      MEDICATIONS  (STANDING):  albumin human 25% IVPB 50 milliLiter(s) IV Intermittent every 8 hours  allopurinol 300 milliGRAM(s) Oral every 24 hours  atorvastatin 20 milliGRAM(s) Oral at bedtime  carvedilol 6.25 milliGRAM(s) Oral every 12 hours  ceFAZolin   IVPB 2000 milliGRAM(s) IV Intermittent every 12 hours  chlorhexidine 2% Cloths 1 Application(s) Topical <User Schedule>  cholestyramine Powder (Sugar-Free) 4 Gram(s) Oral every 24 hours  dextrose 10% Bolus 125 milliLiter(s) IV Bolus once  dextrose 5%. 1000 milliLiter(s) (100 mL/Hr) IV Continuous <Continuous>  dextrose 5%. 1000 milliLiter(s) (50 mL/Hr) IV Continuous <Continuous>  dextrose 50% Injectable 25 Gram(s) IV Push once  dextrose 50% Injectable 12.5 Gram(s) IV Push once  ergocalciferol 37181 Unit(s) Oral <User Schedule>  glucagon  Injectable 1 milliGRAM(s) IntraMuscular once  hydrocortisone Enema 100 milliGRAM(s) Rectal every 12 hours  insulin lispro (ADMELOG) corrective regimen sliding scale   SubCutaneous three times a day before meals  insulin lispro (ADMELOG) corrective regimen sliding scale   SubCutaneous at bedtime  levothyroxine 50 MICROGram(s) Oral every 24 hours  Omegaven 10grams/100 mL 60 Gram(s),IV Solution 600 milliLiter(s) 60 Gram(s) (50 mL/Hr) IV Continuous <Continuous>  pancrelipase  (CREON 24,000 Lipase Units) 1 Capsule(s) Oral three times a day with meals  Parenteral Nutrition - Adult 1 Each (83 mL/Hr) TPN Continuous <Continuous>  ursodiol Capsule 300 milliGRAM(s) Oral every 8 hours  venlafaxine XR. 300 milliGRAM(s) Oral every 24 hours    MEDICATIONS  (PRN):  albuterol/ipratropium for Nebulization 3 milliLiter(s) Nebulizer every 6 hours PRN Shortness of Breath and/or Wheezing  dextrose Oral Gel 15 Gram(s) Oral once PRN Blood Glucose LESS THAN 70 milliGRAM(s)/deciliter  LORazepam   Injectable 0.25 milliGRAM(s) IV Push every 24 hours PRN Anxiety  melatonin 5 milliGRAM(s) Oral at bedtime PRN Insomnia  ondansetron Injectable 4 milliGRAM(s) IV Push every 6 hours PRN Nausea and/or Vomiting      Drips:     ICU Vital Signs Last 24 Hrs  T(C): 36.5 (03 Jun 2024 09:00), Max: 37.2 (03 Jun 2024 00:44)  T(F): 97.7 (03 Jun 2024 09:00), Max: 98.9 (03 Jun 2024 00:44)  HR: 74 (03 Jun 2024 10:00) (61 - 90)  BP: 143/65 (03 Jun 2024 10:00) (113/53 - 157/64)  BP(mean): 94 (03 Jun 2024 10:00) (77 - 94)  ABP: --  ABP(mean): --  RR: 28 (03 Jun 2024 10:00) (16 - 28)  SpO2: 70% (03 Jun 2024 10:00) (70% - 100%)    O2 Parameters below as of 03 Jun 2024 10:00  Patient On (Oxygen Delivery Method): BiPAP/CPAP    O2 Concentration (%): 40        Physical Exam:  General: NAD  HEENT: NC/AT, EOMI, PERRLA, normal hearing, no oral lesions, neck supple w/o LAD; scleral icterus, jaundice  Pulmonary: mildly distressed, on BiPAP. intermittent tachypnea; clear to auscultation b/l  Cardiovascular: NSR, no murmurs  Abdominal: soft, RUQ stoma involuted without stigmata of bleeding; LUQ fistula with bilious drainage, persistent ooze from mild oozing from 7'oclock position, pressure w/ fibrillar - hemostasis achieved  Extremities: WWP, pitting edema b/l lower extremity to knees  Neuro: A/O x3, CNs II-XII grossly intact, normal motor/sensation, no focal deficits  Pulses: palpable distal pulses      I&O's Summary    02 Jun 2024 07:01  -  03 Jun 2024 07:00  --------------------------------------------------------  IN: 2876 mL / OUT: 1380 mL / NET: 1496 mL    03 Jun 2024 07:01  -  03 Jun 2024 10:44  --------------------------------------------------------  IN: 249 mL / OUT: 250 mL / NET: -1 mL        LABS:                        7.7    11.36 )-----------( 99       ( 03 Jun 2024 05:12 )             23.5     06-03    139  |  107  |  64<H>  ----------------------------<  148<H>  4.6   |  25  |  1.50<H>    Ca    8.3<L>      03 Jun 2024 05:12  Phos  2.8     06-03  Mg     2.2     06-03    TPro  7.1  /  Alb  2.4<L>  /  TBili  10.9<H>  /  DBili  x   /  AST  374<H>  /  ALT  53<H>  /  AlkPhos  98  06-03    PT/INR - ( 03 Jun 2024 05:12 )   PT: 18.0 sec;   INR: 1.60          PTT - ( 03 Jun 2024 05:12 )  PTT:40.5 sec  Urinalysis Basic - ( 03 Jun 2024 05:12 )    Color: x / Appearance: x / SG: x / pH: x  Gluc: 148 mg/dL / Ketone: x  / Bili: x / Urobili: x   Blood: x / Protein: x / Nitrite: x   Leuk Esterase: x / RBC: x / WBC x   Sq Epi: x / Non Sq Epi: x / Bacteria: x      CAPILLARY BLOOD GLUCOSE      POCT Blood Glucose.: 143 mg/dL (02 Jun 2024 21:45)  POCT Blood Glucose.: 131 mg/dL (02 Jun 2024 20:06)  POCT Blood Glucose.: 132 mg/dL (02 Jun 2024 13:28)    LIVER FUNCTIONS - ( 03 Jun 2024 05:12 )  Alb: 2.4 g/dL / Pro: 7.1 g/dL / ALK PHOS: 98 U/L / ALT: 53 U/L / AST: 374 U/L / GGT: x             Cultures:    RADIOLOGY & ADDITIONAL STUDIES:

## 2024-06-03 NOTE — PROGRESS NOTE ADULT - ASSESSMENT
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 Prolonged hospital course significant for recurrent AKIs, cholestatic transaminitis s/p perc cony, prior DVTs, PNAs, fungemia, sinus pauses s/p pacemaker, return to [facility] multiple times after discharge in Feb2024 for recurrent bleeding from ileostomy, fistula and wound bed, most recently s/p IR embolization of L branch of inferior epigastric artery via R groin access (4/16), S/p ileoscopy, found an ulcer w/ clot proximal to stoma w/ hemoclip applied (5/1). most recently transferred to SICU 5/8 overnight for 2L blood loss from fistula site with subsequent hypotension. s/p ileoscopy, noting erythematous patch of bowel just deep to the ECF, without any active bleeding (5/9). S/p IR angiography noting several areas of hyperemia in the jejunum adjacent to the stoma however no evidence of active bleeding (5/9), also noted MSSA bacteremia (5/9) likely due to line sepsis/PICC infection, started on Ancef, portal vein thrombosis per US from 5/13/24, but not candidate for anticoagulation due to persistent bleeding. Ultimately transferred from [facility] to [facility] 5/20 for possible surgical intervention for persist bleeding, but deemed not candidate for surgery due to portal hypertension. pt readmitted to [facility] Medicine tele on 5/29 for fistula wound bed bleeding. s/p PRBC transfusion. Pt transferred from [facility] to SICU on 6/1 for persistent fistula wound bed bleeding with acute blood loss anemia for close management.     Neuro: Pain: Dilaudid PRN. Hx: Anxiety: Continue Effexor and Ativan PRN. Ambien on hold. Nausea: Zofran PRN.  CV: maintaining MAPs > 65 without pressors. Hx AFib: cont coreg, no AC given recurrent unprovoked bleed. Hx HLD: cont Atorvastatin. has dependent edema. will give albumin 25% for 48hrs.   Pulm: chronic Pleural Effusions b/l - Bumex 1mg today, possible thoracentesis of L pleural effusion tomorrow if no response to Bumex, nightly BIPAP at 40%. POCUS as needed.   GI: persistent elevated T bili s/p Hepatology consults and workup in past, US with portal vein thrombosis 5/13, but unable to anticoagulate due to recurrent bleeding. Cont cholestyramine, ursodiol, creon, octreotide (in TPN), Crohns: cont hydrocortisone enemas via ileostomy, Continue TPN, smofflipids on Monday, Omegaven on Tues to Sunday.  holding off on remicade in setting of MSSA bacteremia. Dr Zhao consulted on interventional options for portal htn - pending CTA A/P today  : Voids. BUN/Cr up slightly, cont monitor.   ID: MSSA Bacteremia MSSA from BCx (5/9), TTE negative for vegetation. 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 Hx: Hypothyroidism. Continue synthroid. insulin in TPN, watch fingerstick   PPx: SCDs, SQH on hold for bleeding.   Heme: acute blood loss anemia, likely due to EC fistula area bleed. Received multiple PRBCs, FFP, plt and cryo in recent admission. seen by Leticia in recent admission. Total 4u pRBC this admission.  Wounds: Wound manager managed by Wound care team.   Lines: double lumen L PICC (5/17--),   PT: OOBTC daily.   Dispo: SICU

## 2024-06-03 NOTE — PROGRESS NOTE ADULT - ASSESSMENT
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 Prolonged hospital course significant for recurrent AKIs, cholestatic transaminitis s/p perc cony, prior DVTs, PNAs, fungemia, sinus pauses s/p pacemaker, return to [facility] multiple times after discharge in Feb2024 for recurrent bleeding from ileostomy, fistula and wound bed, most recently s/p IR embolization of L branch of inferior epigastric artery via R groin access (4/16), S/p ileoscopy, found an ulcer w/ clot proximal to stoma w/ hemoclip applied (5/1). most recently transferred to SICU 5/8 overnight for 2L blood loss from fistula site with subsequent hypotension. s/p ileoscopy, noting erythematous patch of bowel just deep to the ECF, without any active bleeding (5/9). S/p IR angiography noting several areas of hyperemia in the jejunum adjacent to the stoma however no evidence of active bleeding (5/9), also noted MSSA bacteremia (5/9) likely due to line sepsis/PICC infection, started on Ancef, portal vein thrombosis per US from 5/13/24, but not candidate for anticoagulation due to persistent bleeding. Ultimately transferred from [facility] to [facility] 5/20 for possible surgical intervention for persist bleeding, but deemed not candidate for surgery due to portal hypertension. pt readmitted to [facility] Medicine tele on 5/29 for fistula wound bed bleeding. s/p PRBC transfusion. Pt transferred from [facility] to SICU on 6/1 for persistent fistula wound bed bleeding with acute blood loss anemia for close management.     Plan:  - IR consult for management of portal vein thrombus  - Rest of care as per SICU team  Plan discussed with chief resident and attending, Dr. Peterson.  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 Prolonged hospital course significant for recurrent AKIs, cholestatic transaminitis s/p perc cony, prior DVTs, PNAs, fungemia, sinus pauses s/p pacemaker, return to [facility] multiple times after discharge in Feb2024 for recurrent bleeding from ileostomy, fistula and wound bed, most recently s/p IR embolization of L branch of inferior epigastric artery via R groin access (4/16), S/p ileoscopy, found an ulcer w/ clot proximal to stoma w/ hemoclip applied (5/1). most recently transferred to SICU 5/8 overnight for 2L blood loss from fistula site with subsequent hypotension. s/p ileoscopy, noting erythematous patch of bowel just deep to the ECF, without any active bleeding (5/9). S/p IR angiography noting several areas of hyperemia in the jejunum adjacent to the stoma however no evidence of active bleeding (5/9), also noted MSSA bacteremia (5/9) likely due to line sepsis/PICC infection, started on Ancef, portal vein thrombosis per US from 5/13/24, but not candidate for anticoagulation due to persistent bleeding. Ultimately transferred from [facility] to [facility] 5/20 for possible surgical intervention for persist bleeding, but deemed not candidate for surgery due to portal hypertension. pt readmitted to [facility] Medicine tele on 5/29 for fistula wound bed bleeding. s/p PRBC transfusion. Pt transferred from [facility] to SICU on 6/1 for persistent fistula wound bed bleeding with acute blood loss anemia for close management.     Plan:  - IR consult for management of portal vein thrombus  - F/u hepatology final recs  - Consult Dr. Pate plastics   - Rest of care as per SICU team  Plan discussed with chief resident and attending, Dr. Peterson.  4

## 2024-06-03 NOTE — PROGRESS NOTE ADULT - SUBJECTIVE AND OBJECTIVE BOX
INTERVAL HPI/OVERNIGHT EVENTS:  ON: Bleed from the ostomy site again at the 11 o'clock site approx 200ccs. Pressure held, redressed with surgicel. CBC sent, Hgb 8.3 (from 7.2). Cystatin C 2.65, GFR 20.     SUBJECTIVE:  Patient seen and examined by chief resident and team on AM rounds. Patient reports feeling SOB, however satting 99% on BIPAP. Complaining of intermittent nausea as well.     MEDICATIONS  (STANDING):  albumin human 25% IVPB 50 milliLiter(s) IV Intermittent every 8 hours  allopurinol 300 milliGRAM(s) Oral every 24 hours  atorvastatin 20 milliGRAM(s) Oral at bedtime  carvedilol 6.25 milliGRAM(s) Oral every 12 hours  ceFAZolin   IVPB 2000 milliGRAM(s) IV Intermittent every 8 hours  cholestyramine Powder (Sugar-Free) 4 Gram(s) Oral every 24 hours  dextrose 10% Bolus 125 milliLiter(s) IV Bolus once  dextrose 5%. 1000 milliLiter(s) (100 mL/Hr) IV Continuous <Continuous>  dextrose 5%. 1000 milliLiter(s) (50 mL/Hr) IV Continuous <Continuous>  dextrose 50% Injectable 25 Gram(s) IV Push once  dextrose 50% Injectable 12.5 Gram(s) IV Push once  ergocalciferol 12708 Unit(s) Oral <User Schedule>  glucagon  Injectable 1 milliGRAM(s) IntraMuscular once  hydrocortisone Enema 100 milliGRAM(s) Rectal every 12 hours  insulin lispro (ADMELOG) corrective regimen sliding scale   SubCutaneous three times a day before meals  insulin lispro (ADMELOG) corrective regimen sliding scale   SubCutaneous at bedtime  levothyroxine 50 MICROGram(s) Oral every 24 hours  Omegaven 10G/100mL 60 Gram(s) 60 Gram(s) (50 mL/Hr) IV Continuous <Continuous>  Omegaven 10g/100mL 60 Gram(s),OMEGAVEN 60GRAMS/600ML 60 Gram(s) 60 Gram(s) (50 mL/Hr) IV Continuous <Continuous>  Omegaven 10grams/100 mL 60 Gram(s),IV Solution 600 milliLiter(s) 60 Gram(s) (50 mL/Hr) IV Continuous <Continuous>  pancrelipase  (CREON 24,000 Lipase Units) 1 Capsule(s) Oral three times a day with meals  Parenteral Nutrition - Adult 1 Each (83 mL/Hr) TPN Continuous <Continuous>  ursodiol Capsule 300 milliGRAM(s) Oral every 8 hours  venlafaxine XR. 300 milliGRAM(s) Oral every 24 hours    MEDICATIONS  (PRN):  albuterol/ipratropium for Nebulization 3 milliLiter(s) Nebulizer every 6 hours PRN Shortness of Breath and/or Wheezing  dextrose Oral Gel 15 Gram(s) Oral once PRN Blood Glucose LESS THAN 70 milliGRAM(s)/deciliter  LORazepam   Injectable 0.25 milliGRAM(s) IV Push every 24 hours PRN Anxiety  melatonin 5 milliGRAM(s) Oral at bedtime PRN Insomnia      Vital Signs Last 24 Hrs  T(C): 36.2 (03 Jun 2024 05:01), Max: 37.2 (03 Jun 2024 00:44)  T(F): 97.2 (03 Jun 2024 05:01), Max: 98.9 (03 Jun 2024 00:44)  HR: 72 (03 Jun 2024 06:11) (61 - 90)  BP: 131/61 (03 Jun 2024 06:00) (113/53 - 157/64)  BP(mean): 88 (03 Jun 2024 06:00) (77 - 92)  RR: 20 (03 Jun 2024 06:11) (15 - 26)  SpO2: 99% (03 Jun 2024 06:11) (89% - 100%)    Parameters below as of 03 Jun 2024 06:11  Patient On (Oxygen Delivery Method): BiPAP/CPAP    O2 Concentration (%): 40    PHYSICAL EXAM:      Constitutional: A&Ox3. NAD  HEENT: MMM, PEERLA  Respiratory: non labored breathing, no respiratory distress, satting well on BIPAP  Cardiovascular: NSR, RRR  Gastrointestinal: soft, NTND abdomen. Ileostomy without any output. ECF wound bed without stigmata of recent bleed, light brown fluid in bag. Fistula present pink and well perfused.   Extremities: WWP, + BLE pitting edema    I&O's Detail    02 Jun 2024 07:01  -  03 Jun 2024 07:00  --------------------------------------------------------  IN:    Albumin 25%  -  50 mL: 150 mL    freetext medication - Infusion: 400 mL    IV PiggyBack: 50 mL    Oral Fluid: 100 mL    PRBCs (Packed Red Blood Cells): 350 mL    TPN (Total Parenteral Nutrition): 1826 mL  Total IN: 2876 mL    OUT:    Drain (mL): 550 mL    Voided (mL): 720 mL  Total OUT: 1270 mL    Total NET: 1606 mL          LABS:                        7.7    11.36 )-----------( 99       ( 03 Jun 2024 05:12 )             23.5     06-03    139  |  107  |  64<H>  ----------------------------<  148<H>  4.6   |  25  |  1.50<H>    Ca    8.3<L>      03 Jun 2024 05:12  Phos  2.8     06-03  Mg     2.2     06-03    TPro  7.1  /  Alb  2.4<L>  /  TBili  10.9<H>  /  DBili  x   /  AST  374<H>  /  ALT  53<H>  /  AlkPhos  98  06-03    PT/INR - ( 03 Jun 2024 05:12 )   PT: 18.0 sec;   INR: 1.60          PTT - ( 03 Jun 2024 05:12 )  PTT:40.5 sec  Urinalysis Basic - ( 03 Jun 2024 05:12 )    Color: x / Appearance: x / SG: x / pH: x  Gluc: 148 mg/dL / Ketone: x  / Bili: x / Urobili: x   Blood: x / Protein: x / Nitrite: x   Leuk Esterase: x / RBC: x / WBC x   Sq Epi: x / Non Sq Epi: x / Bacteria: x        RADIOLOGY & ADDITIONAL STUDIES:

## 2024-06-03 NOTE — PROGRESS NOTE ADULT - ASSESSMENT
78M with PMH of a fib/flutter with numerous DCCVs in the past with prolonged admission last year for Crohn's SBO (s/p open TRE and SBR c/b abdominal wall dehiscence and development of enteroatmospheric fistula), now presenting after recent admission from [place], with bleeding from fistula site and a new diagnosis of portal hypertension based off of liver biopsy done at [place] (wedge pressure of 17). Hepatology consulted for bleeding from ostomy and further management of portal hypertension.     Symptoms possibly 2/2 to bleeding from an ectopic varix. Tolerating Coreg well. Has had multiple episodes of bleeding now from fistula site, so IR (Dr. Zhao) was consulted.     Meld 3.0 score: 27 based on 06/03/2024 labs.     Recommendations:   - continue Coreg 6.25 mg PO BID and can up-titrate as blood pressure permits to better reduce portal HTN   - continue octreotide in TPN for bleeding related to portal hypertension   - IR consulted (Dr. Zhao) to determine if an intervention could be performed on an ectopic varix (sclerosis, embolization)   - f/u repeat CT abdomen/pelvis with IV contrast per IR   - patient would be a poor candidate for a TIPs procedure   - trend CBC, CMP, and INR daily     Hepatology Team will continue to follow.     Esme Barber D.O.   Gastroenterology Fellow  Weekday 7am-5pm Pager: [phone]  Weeknights/Weekend/Holiday Coverage: Please call the  for contact info

## 2024-06-03 NOTE — CHART NOTE - NSCHARTNOTEFT_GEN_A_CORE
Admitting Diagnosis:   Patient is a 78y old  Male who presents with a chief complaint of Symptomatic Anemia (03 Jun 2024 10:48)      PAST MEDICAL & SURGICAL HISTORY:  Essential hypertension  Hypertension      Atrial fibrillation  s/p cardioversion 2010 and 2014  Pt. reports 4 DCCV  Now on Amiodarone 200mg PO bid and Eliquis 5mg PO bid  Last DCCV 4 yrs ago at [facility]      Crohn's disease  s/p partial resection of ileum      Hyperlipidemia      Hypothyroidism      History of depression  On Venlafaxine ER 150mg PO bid      Junctional rhythm      Bradycardia      H/O knee surgery      History of cataract surgery      S/P appendectomy    Current Nutrition Order:   Diet, NPO:   Except Medications (06-03-24 @ 13:36)    TPN: 370g dextrose, 136g amino acids, 50 gSMOF today     PO Intake: N/A    GI Issues: Ostomy and fistula with bleeding at 7 and 11 o'clock, last bowel movement 5/30    Pain: 0 per chart review     Skin Integrity: Skin tear L arm, +3 generalized edema     06-02-24 @ 07:01  -  06-03-24 @ 07:00  --------------------------------------------------------  IN: 2876 mL / OUT: 1380 mL / NET: 1496 mL    06-03-24 @ 07:01  -  06-03-24 @ 15:56  --------------------------------------------------------  IN: 664 mL / OUT: 1525 mL / NET: -861 mL    Labs:   06-03    139  |  107  |  64<H>  ----------------------------<  148<H>  4.6   |  25  |  1.50<H>    Ca    8.3<L>      03 Jun 2024 05:12  Phos  2.8     06-03  Mg     2.2     06-03    TPro  7.1  /  Alb  2.4<L>  /  TBili  10.9<H>  /  DBili  x   /  AST  374<H>  /  ALT  53<H>  /  AlkPhos  98  06-03    CAPILLARY BLOOD GLUCOSE    POCT Blood Glucose.: 131 mg/dL (03 Jun 2024 11:14)  POCT Blood Glucose.: 143 mg/dL (02 Jun 2024 21:45)  POCT Blood Glucose.: 131 mg/dL (02 Jun 2024 20:06)    Medications:  MEDICATIONS  (STANDING):  albumin human 25% IVPB 50 milliLiter(s) IV Intermittent every 8 hours  allopurinol 300 milliGRAM(s) Oral every 24 hours  atorvastatin 20 milliGRAM(s) Oral at bedtime  carvedilol 6.25 milliGRAM(s) Oral every 12 hours  ceFAZolin   IVPB 2000 milliGRAM(s) IV Intermittent every 12 hours  chlorhexidine 2% Cloths 1 Application(s) Topical <User Schedule>  cholestyramine Powder (Sugar-Free) 4 Gram(s) Oral every 24 hours  dextrose 10% Bolus 125 milliLiter(s) IV Bolus once  dextrose 5%. 1000 milliLiter(s) (100 mL/Hr) IV Continuous <Continuous>  dextrose 5%. 1000 milliLiter(s) (50 mL/Hr) IV Continuous <Continuous>  dextrose 50% Injectable 25 Gram(s) IV Push once  dextrose 50% Injectable 12.5 Gram(s) IV Push once  ergocalciferol 47311 Unit(s) Oral <User Schedule>  glucagon  Injectable 1 milliGRAM(s) IntraMuscular once  hydrocortisone Enema 100 milliGRAM(s) Rectal every 12 hours  insulin lispro (ADMELOG) corrective regimen sliding scale   SubCutaneous three times a day before meals  insulin lispro (ADMELOG) corrective regimen sliding scale   SubCutaneous at bedtime  levothyroxine 50 MICROGram(s) Oral every 24 hours  lipid, fat emulsion (Fish Oil and Plant Based) 20% Infusion 0.505 Gm/kG/Day (20.83 mL/Hr) IV Continuous <Continuous>  pancrelipase  (CREON 24,000 Lipase Units) 1 Capsule(s) Oral three times a day with meals  Parenteral Nutrition - Adult 1 Each (83 mL/Hr) TPN Continuous <Continuous>  Parenteral Nutrition - Adult 1 Each (83 mL/Hr) TPN Continuous <Continuous>  ursodiol Capsule 300 milliGRAM(s) Oral every 8 hours  venlafaxine XR. 300 milliGRAM(s) Oral every 24 hours    MEDICATIONS  (PRN):  albuterol/ipratropium for Nebulization 3 milliLiter(s) Nebulizer every 6 hours PRN Shortness of Breath and/or Wheezing  dextrose Oral Gel 15 Gram(s) Oral once PRN Blood Glucose LESS THAN 70 milliGRAM(s)/deciliter  LORazepam   Injectable 0.25 milliGRAM(s) IV Push every 24 hours PRN Anxiety  melatonin 5 milliGRAM(s) Oral at bedtime PRN Insomnia  ondansetron Injectable 4 milliGRAM(s) IV Push every 6 hours PRN Nausea and/or Vomiting    Height for BMI (CENTIMETERS)	185.4 Centimeter(s)  Weight for BMI (lbs)	218.3 lb  Weight for BMI (kg)	99 kg  Body Mass Index	28.8    Weight Change: No new wt since admit. Gather wts weekly.     Estimated energy needs:   Weight used for calculations	IBW  Estimated Energy Needs Weight (lbs)	184.3 lb  Estimated Energy Needs Weight (kg)	83.6 kg  Estimated Energy Needs From (irma/kg)	30  Estimated Energy Needs To (irma/kg)	35  Estimated Energy Needs Calculated From (irma/kg)	2508  Estimated Energy Needs Calculated To (irma/kg)	2926    Estimated Protein Needs Weight (lbs)	184.3 lb  Estimated Protein Needs Weight (kg)	83.6 kg  Estimated Protein Needs From (g/kg)	1.5  Estimated Protein Needs To (g/kg)	2  Estimated Protein Needs Calculated From (g/kg)	125.4  Estimated Protein Needs Calculated To (g/kg)	167.2    Other Calculations	Fluids per team. Needs determined using ideal body weight 83.6 kg adjusted for clinical judgement for high output fistula, long-term TPN, Chron's, bacteremia.    TPN Monitoring 6/3:   -Labs: Na 139, K 4.0, Cl 107, AG 7, BUn 64 (H), Cr 1.5 (H), Ca 8.3 (L),  (H), ALT 53 (H), GFR 48 (L), Mg 2.2, Phos 2.8  -BG x24hrs: 148-149  -I/Os x24hrs: 1469 ml     Subjective: 78M with PMHx of AFib/Flutter, Crohns SBO s/p open TRE and SBR c/b abdominal wall dehiscence and development of enteroatmospheric fistula, recurrent AKIs, cholestatic transaminitis s/p perc cony, prior DVTs, PNAs, fungemia, sinus pauses s/p pacemaker and recurrent bleeding from fistula, recently transferred to [place] for further surgical care, now transferred back to [facility] admitted to [facility] for management of portal HTN. 6/1: Stepped up to SICU in setting of fistula bleeding.     Pt care discussed in interdisciplinary care team rounds. Rx and labs reviewed. Pt continues on TPN via PICC; see details below. Ordered for SMOF lipids today, and continue Omegaven tomorrow and remainder of week. Pt edematous with increasing BUN/Cr; started on 25% albumin x48hrs. Persistent bilateral PE; tx with bumex today and possible thoracentesis if limited improvement with diuresis. Hepatology following; c/f portal HTN, CT A/P today. No other reports GI distress or further nutritional concerns at this time. RDN will continue to reassess, intervene, and monitor as appropriate.     Previous Nutrition Diagnosis: Increased Nutrient Needs...  Altered GI Function energy/protein related to high output fistula, bacteremia, Chron's, long-term parenteral nutrition support as evidenced by increased metabolic demands for healing and to compensate for potential losses.    Active [ x ]  Resolved [   ]    Goal: Pt will meet 75% or more of protein/energy needs via most appropriate route for nutrition.    Recommendations:  -Continue parenteral nutrition support as primary source of nutrition via PICC    *Recommend continue 370g dextrose, 136g amino acids, and 50g SMOF (M) or 56g Omegaven (T, W, Th, F, Sa, Garcia).    *Consider goal regimen of 373g dextrose, 167g amino acids, and 85g Omegaven (655 calories, 25% total calories) to provide 2591 calories and 167 gProt. with GIR of 2.62 mg/kg/min. This is 23 nonprotein calories (31 calories/kg) and 2.0 gProt. per kg ideal body weight 83.6 kg.     >Continue 24hr infusion as pt with limited improvement cyclic parenteral nutrition support     >Continue SMOF lipids x1/weeks to avoid EFAD      -Consider triene:tetraene test     >Consider UUN and/or fecal fat test to assess absorption and protein utilization   -Daily wts, BMP with Mg/Phos, I/Os, and BG checks q6hr or as needed    *Continue Cu, Zn, and Se MWF in TPN  -Weekly TGs 6hr post-transfusion   -Advance diet as tolerated    *Goal for low fiber diet with Ottoniel BID   -Monitor skin integrity; wound care RN following   -Monitor GI function and ostomy/fistula output   -Align nutrition with goals of care at all times    Risk Level: High [ x ] Moderate [   ] Low [   ] Admitting Diagnosis:   Patient is a 78y old  Male who presents with a chief complaint of Symptomatic Anemia (03 Jun 2024 10:48)      PAST MEDICAL & SURGICAL HISTORY:  Essential hypertension  Hypertension      Atrial fibrillation  s/p cardioversion 2010 and 2014  Pt. reports 4 DCCV  Now on Amiodarone 200mg PO bid and Eliquis 5mg PO bid  Last DCCV 4 yrs ago at [facility]      Crohn's disease  s/p partial resection of ileum      Hyperlipidemia      Hypothyroidism      History of depression  On Venlafaxine ER 150mg PO bid      Junctional rhythm      Bradycardia      H/O knee surgery      History of cataract surgery      S/P appendectomy    Current Nutrition Order:   Diet, NPO:   Except Medications (06-03-24 @ 13:36)    TPN: 370g dextrose, 136g amino acids, 50 gSMOF today     PO Intake: N/A    GI Issues: Ostomy and fistula with bleeding at 7 and 11 o'clock, last bowel movement 5/30    Pain: 0 per chart review     Skin Integrity: Skin tear L arm, +3 generalized edema     06-02-24 @ 07:01  -  06-03-24 @ 07:00  --------------------------------------------------------  IN: 2876 mL / OUT: 1380 mL / NET: 1496 mL    06-03-24 @ 07:01  -  06-03-24 @ 15:56  --------------------------------------------------------  IN: 664 mL / OUT: 1525 mL / NET: -861 mL    Labs:   06-03    139  |  107  |  64<H>  ----------------------------<  148<H>  4.6   |  25  |  1.50<H>    Ca    8.3<L>      03 Jun 2024 05:12  Phos  2.8     06-03  Mg     2.2     06-03    TPro  7.1  /  Alb  2.4<L>  /  TBili  10.9<H>  /  DBili  x   /  AST  374<H>  /  ALT  53<H>  /  AlkPhos  98  06-03    CAPILLARY BLOOD GLUCOSE    POCT Blood Glucose.: 131 mg/dL (03 Jun 2024 11:14)  POCT Blood Glucose.: 143 mg/dL (02 Jun 2024 21:45)  POCT Blood Glucose.: 131 mg/dL (02 Jun 2024 20:06)    Medications:  MEDICATIONS  (STANDING):  albumin human 25% IVPB 50 milliLiter(s) IV Intermittent every 8 hours  allopurinol 300 milliGRAM(s) Oral every 24 hours  atorvastatin 20 milliGRAM(s) Oral at bedtime  carvedilol 6.25 milliGRAM(s) Oral every 12 hours  ceFAZolin   IVPB 2000 milliGRAM(s) IV Intermittent every 12 hours  chlorhexidine 2% Cloths 1 Application(s) Topical <User Schedule>  cholestyramine Powder (Sugar-Free) 4 Gram(s) Oral every 24 hours  dextrose 10% Bolus 125 milliLiter(s) IV Bolus once  dextrose 5%. 1000 milliLiter(s) (100 mL/Hr) IV Continuous <Continuous>  dextrose 5%. 1000 milliLiter(s) (50 mL/Hr) IV Continuous <Continuous>  dextrose 50% Injectable 25 Gram(s) IV Push once  dextrose 50% Injectable 12.5 Gram(s) IV Push once  ergocalciferol 12022 Unit(s) Oral <User Schedule>  glucagon  Injectable 1 milliGRAM(s) IntraMuscular once  hydrocortisone Enema 100 milliGRAM(s) Rectal every 12 hours  insulin lispro (ADMELOG) corrective regimen sliding scale   SubCutaneous three times a day before meals  insulin lispro (ADMELOG) corrective regimen sliding scale   SubCutaneous at bedtime  levothyroxine 50 MICROGram(s) Oral every 24 hours  lipid, fat emulsion (Fish Oil and Plant Based) 20% Infusion 0.505 Gm/kG/Day (20.83 mL/Hr) IV Continuous <Continuous>  pancrelipase  (CREON 24,000 Lipase Units) 1 Capsule(s) Oral three times a day with meals  Parenteral Nutrition - Adult 1 Each (83 mL/Hr) TPN Continuous <Continuous>  Parenteral Nutrition - Adult 1 Each (83 mL/Hr) TPN Continuous <Continuous>  ursodiol Capsule 300 milliGRAM(s) Oral every 8 hours  venlafaxine XR. 300 milliGRAM(s) Oral every 24 hours    MEDICATIONS  (PRN):  albuterol/ipratropium for Nebulization 3 milliLiter(s) Nebulizer every 6 hours PRN Shortness of Breath and/or Wheezing  dextrose Oral Gel 15 Gram(s) Oral once PRN Blood Glucose LESS THAN 70 milliGRAM(s)/deciliter  LORazepam   Injectable 0.25 milliGRAM(s) IV Push every 24 hours PRN Anxiety  melatonin 5 milliGRAM(s) Oral at bedtime PRN Insomnia  ondansetron Injectable 4 milliGRAM(s) IV Push every 6 hours PRN Nausea and/or Vomiting    Height for BMI (CENTIMETERS)	185.4 Centimeter(s)  Weight for BMI (lbs)	218.3 lb  Weight for BMI (kg)	99 kg  Body Mass Index	28.8    Weight Change: No new wt since admit. Gather wts weekly.     Estimated energy needs:   Weight used for calculations	IBW  Estimated Energy Needs Weight (lbs)	184.3 lb  Estimated Energy Needs Weight (kg)	83.6 kg  Estimated Energy Needs From (irma/kg)	30  Estimated Energy Needs To (irma/kg)	35  Estimated Energy Needs Calculated From (irma/kg)	2508  Estimated Energy Needs Calculated To (irma/kg)	2926    Estimated Protein Needs Weight (lbs)	184.3 lb  Estimated Protein Needs Weight (kg)	83.6 kg  Estimated Protein Needs From (g/kg)	1.5  Estimated Protein Needs To (g/kg)	2  Estimated Protein Needs Calculated From (g/kg)	125.4  Estimated Protein Needs Calculated To (g/kg)	167.2    Other Calculations	Fluids per team. Needs determined using ideal body weight 83.6 kg adjusted for clinical judgement for high output fistula, long-term TPN, Chron's, bacteremia.    TPN Monitoring 6/3:   -Labs: Na 139, K 4.0, Cl 107, AG 7, BUn 64 (H), Cr 1.5 (H), Ca 8.3 (L),  (H), ALT 53 (H), GFR 48 (L), Mg 2.2, Phos 2.8  -BG x24hrs: 148-149  -I/Os x24hrs: 1469 ml     Subjective: 78M with PMHx of AFib/Flutter, Crohns SBO s/p open TRE and SBR c/b abdominal wall dehiscence and development of enteroatmospheric fistula, recurrent AKIs, cholestatic transaminitis s/p perc cony, prior DVTs, PNAs, fungemia, sinus pauses s/p pacemaker and recurrent bleeding from fistula, recently transferred to [place] for further surgical care, now transferred back to [facility] admitted to [facility] for management of portal HTN. 6/1: Stepped up to SICU in setting of fistula bleeding.     Pt care discussed in interdisciplinary care team rounds. Rx and labs reviewed. Pt continues on TPN via PICC; see details below. Ordered for SMOF lipids today, and continue Omegaven tomorrow and remainder of week. Pt edematous with increasing BUN/Cr; started on 25% albumin x48hrs. Persistent bilateral PE; tx with bumex today and possible thoracentesis if limited improvement with diuresis. Hepatology and IR following; c/f portal HTN, CT A/P today. No other reports GI distress or further nutritional concerns at this time. RDN will continue to reassess, intervene, and monitor as appropriate.     Previous Nutrition Diagnosis: Increased Nutrient Needs...  Altered GI Function energy/protein related to high output fistula, bacteremia, Chron's, long-term parenteral nutrition support as evidenced by increased metabolic demands for healing and to compensate for potential losses.    Active [ x ]  Resolved [   ]    Goal: Pt will meet 75% or more of protein/energy needs via most appropriate route for nutrition.    Recommendations:  -Continue parenteral nutrition support as primary source of nutrition via PICC    *Recommend continue 370g dextrose, 136g amino acids, and 50g SMOF (M) or 56g Omegaven (T, W, Th, F, Sa, Garcia).    *Consider goal regimen of 373g dextrose, 167g amino acids, and 85g Omegaven (655 calories, 25% total calories) to provide 2591 calories and 167 gProt. with GIR of 2.62 mg/kg/min. This is 23 nonprotein calories (31 calories/kg) and 2.0 gProt. per kg ideal body weight 83.6 kg.     >Continue 24hr infusion as pt with limited improvement cyclic parenteral nutrition support     >Continue SMOF lipids x1/weeks to avoid EFAD      -Consider triene:tetraene test     >Consider UUN and/or fecal fat test to assess absorption and protein utilization   -Daily wts, BMP with Mg/Phos, I/Os, and BG checks q6hr or as needed    *Continue Cu, Zn, and Se MWF in TPN  -Weekly TGs 6hr post-transfusion   -Advance diet as tolerated    *Goal for low fiber diet with Ottoniel BID   -Monitor skin integrity; wound care RN following   -Monitor GI function and ostomy/fistula output   -Align nutrition with goals of care at all times    Risk Level: High [ x ] Moderate [   ] Low [   ]

## 2024-06-04 NOTE — ADVANCED PRACTICE NURSE CONSULT - RECOMMEDATIONS
Will continue to follow.     Total time spent x 2 WOCNs=60 minutes
Will continue to follow. Total time spent on encounter-90 minutes

## 2024-06-04 NOTE — PROCEDURAL SAFETY CHECKLIST WITH OR WITHOUT SEDATION - NSBLDPRODCTAVAIL_GEN_ALL_CORE
Left voicemail for return phone call discussing 1 NOS and 2 late cancelled appointments in a row. Previously discussed on 9/17/18; see corresponding telephone call. Writer requested return phone call to discuss moving forward and any barriers to keeping appointments. No further appointments scheduled at this time with this writer.   
not applicable

## 2024-06-04 NOTE — PROGRESS NOTE ADULT - ASSESSMENT
78M with PMH of a fib/flutter with numerous DCCVs in the past with prolonged admission last year for Crohn's SBO (s/p open TRE and SBR c/b abdominal wall dehiscence and development of enteroatmospheric fistula), now presenting after recent admission from [place], with bleeding from fistula site and a new diagnosis of portal hypertension based off of liver biopsy done at [facility] (wedge pressure of 17). Hepatology consulted for bleeding from ostomy and further management of portal hypertension.     Symptoms possibly 2/2 to bleeding from an ectopic varix. Tolerating Coreg well. Has had multiple episodes of bleeding now from fistula site, so IR (Dr. Zhao) was consulted.     Meld 3.0 score: 27 based on 06/03/2024 labs.     CT angio (06/03/24):   LIVER: Scattered hepatic cysts. No portal hypertension or varices.  BILE DUCTS: Nondilated.  GALLBLADDER: Normal.  SPLEEN: Normal.  PANCREAS: Mild diffuse duct dilatation.  BOWEL: Status post right hemicolectomy and ileostomy. No bowel obstruction. No active GI bleeding.  ABDOMINAL WALL: Open surgical wound.  IMPRESSION:  *  No bowel obstruction or active GI bleeding.  *  No definite evidence of portal hypertension or varices.    Recommendations:   - continue Coreg 6.25 mg PO BID and can up-titrate as blood pressure permits to better reduce portal HTN   - continue octreotide in TPN for bleeding related to portal hypertension   - IR consulted (Dr. Zhao) to determine if an intervention could be performed on an ectopic varix (sclerosis, embolization)   - patient would be a poor candidate for a TIPs procedure   - trend CBC, CMP, and INR daily     Case discussed with Dr. Romero. Hepatology Team will continue to follow.     Esme Barber D.O.   Gastroenterology Fellow  Weekday 7am-5pm Pager: [phone]  Weeknights/Weekend/Holiday Coverage: Please call the  for contact info

## 2024-06-04 NOTE — CONSULT NOTE ADULT - ASSESSMENT
79 y/o M with complex GI history including portal hypertension of unclear etiology and prolonged hospitalizaiton from  to 02/2024, presents with worsening dyspnea and lethargy, now on BiPAP. He appears clinically fluid overloaded with bilateral pleural effusions. He had these effusions for months, and a chest tube was placed last year, no studies are available. Pulmonary consulted to evaluate pleural effusions.    Effusions are simple in appearence, relatively equal on both sides. Given his diasotlic failure and portal hypertension, suspect transudate effusion, but underlying etiology of portal hypertension unclear, and effusions have not responded well to diuretics in past. Left sided thoracentesis performed under ultrasound guidance.    Plan:  - post thoracentesis CXR  - follow up pleural fluid studies    Patient S/E/D with Dr. Guzmán

## 2024-06-04 NOTE — CONSULT NOTE ADULT - SUBJECTIVE AND OBJECTIVE BOX
Pt saw podiatry last week per last OV recommendations for plantar fascitis. Pt was put on exact same regimen as LP.     Pt does not want to see the specialist again and wonders if there are any treatments we can do at the clinic or any further recommendations that the pt can do? Pt can not afford to continue to see specialists. Noted to pt that the specialist did talk about further injections, etc.    Theodora Schafer RN  [facility]     PULMONARY SERVICE INITIAL CONSULT NOTE    HPI:  78M with PMHx of AFib/Flutter 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 Prolonged hospital course significant for recurrent AKIs, cholestatic transaminitis s/p perc cony, prior DVTs, PNAs, fungemia, sinus pauses s/p pacemaker and recurrent bleeding from fistula recently s/p IR embolization of L branch of inferior epigastric artery via R groin access (4/16), with mutiple presentations afterwards for recurrent bleeding at fistula site requiring transfusions. Most recently admitted 5/5/24 for BRB from ECF requiring multiple transfusions, was transferred to SICU, where bcx returned positive for MSSA managed with Cefazolin 2g (5/10-6/10), was transferred to [facility] for further surgical care, however found to have elevated portal HTN, now transferred back to [facility] admitted to telemetry.   Course complicated by persistent bleeding of fistula.    Pulmonary consulted for bilateral pleural effusions. He had a chest tube placed in past but no studies available. He is more out of breath than usual. The etiology of the effusions are unclear. HE has a history of DVTs, but not on AC due to bleeding.       REVIEW OF SYSTEMS: as above  Constitutional: Gaining weight  Eyes: No eye pain, visual disturbances, or discharge  ENMT:  No difficulty hearing, tinnitus, vertigo; No sinus or throat pain  Neck: No pain, stiffness or neck swelling  Respiratory: see HPI  Cardiovascular: pedal edema present  Gastrointestinal: No abdominal or epigastric pain. No nausea, vomiting or hematemesis; No diarrhea or constipation. No melena or hematochezia.  Genitourinary: No dysuria, frequency, hematuria or incontinence  Neurological: No headaches, memory loss, loss of strength, numbness or tremors  Skin: No itching, burning, rashes or lesions   Lymph Nodes: No enlarged glands  Endocrine: No heat or cold intolerance; No hair loss  Musculoskeletal: No joint pain or swelling; No muscle, back or extremity pain  Psychiatric: No depression, anxiety, mood swings or difficulty sleeping  Heme/Lymph: No easy bruising or bleeding gums  Allergy and Immunologic: No hives or eczema    PAST MEDICAL & SURGICAL HISTORY:  Essential hypertension  Hypertension      Atrial fibrillation  s/p cardioversion 2010 and 2014  Pt. reports 4 DCCV  Now on Amiodarone 200mg PO bid and Eliquis 5mg PO bid  Last DCCV 4 yrs ago at [facility]      Crohn's disease  s/p partial resection of ileum      Hyperlipidemia      Hypothyroidism      History of depression  On Venlafaxine ER 150mg PO bid      Junctional rhythm      Bradycardia      H/O knee surgery      History of cataract surgery      S/P appendectomy          FAMILY HISTORY:      SOCIAL HISTORY:  Smoking Status: [ ] Current, [ ] Former, [ ] Never  Pack Years:    MEDICATIONS:  Pulmonary:  albuterol/ipratropium for Nebulization 3 milliLiter(s) Nebulizer every 6 hours PRN    Antimicrobials:  ceFAZolin   IVPB 2000 milliGRAM(s) IV Intermittent every 12 hours    Anticoagulants:    Onc:    GI/:  pancrelipase  (CREON 24,000 Lipase Units) 1 Capsule(s) Oral three times a day with meals  ursodiol Capsule 300 milliGRAM(s) Oral every 8 hours    Endocrine:  allopurinol 300 milliGRAM(s) Oral every 24 hours  atorvastatin 20 milliGRAM(s) Oral at bedtime  cholestyramine Powder (Sugar-Free) 4 Gram(s) Oral every 24 hours  dextrose 50% Injectable 25 Gram(s) IV Push once  dextrose 50% Injectable 12.5 Gram(s) IV Push once  dextrose Oral Gel 15 Gram(s) Oral once PRN  glucagon  Injectable 1 milliGRAM(s) IntraMuscular once  hydrocortisone Enema 100 milliGRAM(s) Rectal every 12 hours  insulin lispro (ADMELOG) corrective regimen sliding scale   SubCutaneous three times a day before meals  insulin lispro (ADMELOG) corrective regimen sliding scale   SubCutaneous at bedtime  levothyroxine 50 MICROGram(s) Oral every 24 hours    Cardiac:  carvedilol 6.25 milliGRAM(s) Oral every 12 hours    Other Medications:  chlorhexidine 2% Cloths 1 Application(s) Topical <User Schedule>  dextrose 10% Bolus 125 milliLiter(s) IV Bolus once  dextrose 5%. 1000 milliLiter(s) IV Continuous <Continuous>  dextrose 5%. 1000 milliLiter(s) IV Continuous <Continuous>  ergocalciferol 50059 Unit(s) Oral <User Schedule>  LORazepam   Injectable 0.25 milliGRAM(s) IV Push every 24 hours PRN  melatonin 5 milliGRAM(s) Oral at bedtime PRN  Omegaven 10grams/100 mL 60 Gram(s) 60 Gram(s) IV Continuous <Continuous>  ondansetron Injectable 4 milliGRAM(s) IV Push every 6 hours PRN  Parenteral Nutrition - Adult 1 Each TPN Continuous <Continuous>  Parenteral Nutrition - Adult 1 Each TPN Continuous <Continuous>  venlafaxine XR. 300 milliGRAM(s) Oral every 24 hours      Allergies    penicillin (Angioedema)    Intolerances        Vital Signs Last 24 Hrs  T(C): 36.2 (04 Jun 2024 08:25), Max: 37 (03 Jun 2024 22:06)  T(F): 97.1 (04 Jun 2024 08:25), Max: 98.6 (03 Jun 2024 22:06)  HR: 68 (04 Jun 2024 13:00) (68 - 89)  BP: 115/53 (04 Jun 2024 13:00) (89/51 - 140/65)  BP(mean): 77 (04 Jun 2024 13:00) (60 - 101)  RR: 19 (04 Jun 2024 13:00) (16 - 34)  SpO2: 98% (04 Jun 2024 13:00) (89% - 100%)    Parameters below as of 04 Jun 2024 13:00  Patient On (Oxygen Delivery Method): nasal cannula w/ humidification  O2 Flow (L/min): 4      06-03 @ 07:01 - 06-04 @ 07:00  --------------------------------------------------------  IN: 2551.6 mL / OUT: 3840 mL / NET: -1288.4 mL    06-04 @ 07:01 - 06-04 @ 13:28  --------------------------------------------------------  IN: 432 mL / OUT: 750 mL / NET: -318 mL          PHYSICAL EXAM:  Constitutional: well-appearing  Head: NC/AT  EENT: PERRL, anicteric sclera; oropharynx clear, MMM  Neck: supple, no appreciable JVD  Respiratory: CTA B/L; no W/R/R  Cardiovascular: +S1/S2, RRR  Gastrointestinal: soft, NT/ND  Extremities: WWP; no edema, clubbing or cyanosis  Vascular: 2+ radial pulses B/L  Neurological: awake and alert; PEARSON    LABS:      CBC Full  -  ( 04 Jun 2024 03:05 )  WBC Count : 10.57 K/uL  RBC Count : 2.49 M/uL  Hemoglobin : 7.5 g/dL  Hematocrit : 23.5 %  Platelet Count - Automated : 87 K/uL  Mean Cell Volume : 94.4 fl  Mean Cell Hemoglobin : 30.1 pg  Mean Cell Hemoglobin Concentration : 31.9 gm/dL  Auto Neutrophil # : 9.26 K/uL  Auto Lymphocyte # : 0.29 K/uL  Auto Monocyte # : 0.93 K/uL  Auto Eosinophil # : 0.10 K/uL  Auto Basophil # : 0.00 K/uL  Auto Neutrophil % : 87.6 %  Auto Lymphocyte % : 2.7 %  Auto Monocyte % : 8.8 %  Auto Eosinophil % : 0.9 %  Auto Basophil % : 0.0 %    06-04    138  |  102  |  69<H>  ----------------------------<  163<H>  4.2   |  27  |  1.67<H>    Ca    8.3<L>      04 Jun 2024 03:05  Phos  3.7     06-04  Mg     2.0     06-04    TPro  7.2  /  Alb  2.7<L>  /  TBili  11.6<H>  /  DBili  7.8<H>  /  AST  316<H>  /  ALT  41  /  AlkPhos  92  06-04    PT/INR - ( 03 Jun 2024 05:12 )   PT: 18.0 sec;   INR: 1.60          PTT - ( 03 Jun 2024 05:12 )  PTT:40.5 sec      Urinalysis Basic - ( 04 Jun 2024 03:05 )    Color: x / Appearance: x / SG: x / pH: x  Gluc: 163 mg/dL / Ketone: x  / Bili: x / Urobili: x   Blood: x / Protein: x / Nitrite: x   Leuk Esterase: x / RBC: x / WBC x   Sq Epi: x / Non Sq Epi: x / Bacteria: x                RADIOLOGY & ADDITIONAL STUDIES: PULMONARY SERVICE INITIAL CONSULT NOTE    HPI:  78M with PMHx of AFib/Flutter 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 Prolonged hospital course significant for recurrent AKIs, cholestatic transaminitis s/p perc cony, prior DVTs, PNAs, fungemia, sinus pauses s/p pacemaker and recurrent bleeding from fistula recently s/p IR embolization of L branch of inferior epigastric artery via R groin access (4/16), with mutiple presentations afterwards for recurrent bleeding at fistula site requiring transfusions. Most recently admitted 5/5/24 for BRB from ECF requiring multiple transfusions, was transferred to SICU, where bcx returned positive for MSSA managed with Cefazolin 2g (5/10-6/10), was transferred to [facility] for further surgical care, however found to have elevated portal HTN, now transferred back to [facility] admitted to telemetry.   Course complicated by persistent bleeding of fistula.    Pulmonary consulted for bilateral pleural effusions. He had a chest tube placed in past but no studies available. He is more out of breath than usual. The etiology of the effusions are unclear. HE has a history of DVTs, but not on AC due to bleeding.       REVIEW OF SYSTEMS: as above, limited by mental status  Constitutional: Gaining weight  Eyes: No eye pain, visual disturbances, or discharge  ENMT:  No difficulty hearing, tinnitus, vertigo; No sinus or throat pain  Neck: No pain, stiffness or neck swelling  Respiratory: see HPI  Cardiovascular: pedal edema present  Neurological: Lethargic. No headaches, memory loss, loss of strength, numbness or tremorshair loss  Musculoskeletal: No joint pain or swelling; No muscle, back or extremity pain    PAST MEDICAL & SURGICAL HISTORY:  Essential hypertension  Hypertension      Atrial fibrillation  s/p cardioversion 2010 and 2014  Pt. reports 4 DCCV  Now on Amiodarone 200mg PO bid and Eliquis 5mg PO bid  Last DCCV 4 yrs ago at [facility]      Crohn's disease  s/p partial resection of ileum      Hyperlipidemia      Hypothyroidism      History of depression  On Venlafaxine ER 150mg PO bid      Junctional rhythm      Bradycardia      H/O knee surgery      History of cataract surgery      S/P appendectomy          FAMILY HISTORY:      SOCIAL HISTORY:  Smoking Status: [ ] Current, [ ] Former, [ ] Never  Pack Years:    MEDICATIONS:  Pulmonary:  albuterol/ipratropium for Nebulization 3 milliLiter(s) Nebulizer every 6 hours PRN    Antimicrobials:  ceFAZolin   IVPB 2000 milliGRAM(s) IV Intermittent every 12 hours    Anticoagulants:    Onc:    GI/:  pancrelipase  (CREON 24,000 Lipase Units) 1 Capsule(s) Oral three times a day with meals  ursodiol Capsule 300 milliGRAM(s) Oral every 8 hours    Endocrine:  allopurinol 300 milliGRAM(s) Oral every 24 hours  atorvastatin 20 milliGRAM(s) Oral at bedtime  cholestyramine Powder (Sugar-Free) 4 Gram(s) Oral every 24 hours  dextrose 50% Injectable 25 Gram(s) IV Push once  dextrose 50% Injectable 12.5 Gram(s) IV Push once  dextrose Oral Gel 15 Gram(s) Oral once PRN  glucagon  Injectable 1 milliGRAM(s) IntraMuscular once  hydrocortisone Enema 100 milliGRAM(s) Rectal every 12 hours  insulin lispro (ADMELOG) corrective regimen sliding scale   SubCutaneous three times a day before meals  insulin lispro (ADMELOG) corrective regimen sliding scale   SubCutaneous at bedtime  levothyroxine 50 MICROGram(s) Oral every 24 hours    Cardiac:  carvedilol 6.25 milliGRAM(s) Oral every 12 hours    Other Medications:  chlorhexidine 2% Cloths 1 Application(s) Topical <User Schedule>  dextrose 10% Bolus 125 milliLiter(s) IV Bolus once  dextrose 5%. 1000 milliLiter(s) IV Continuous <Continuous>  dextrose 5%. 1000 milliLiter(s) IV Continuous <Continuous>  ergocalciferol 92613 Unit(s) Oral <User Schedule>  LORazepam   Injectable 0.25 milliGRAM(s) IV Push every 24 hours PRN  melatonin 5 milliGRAM(s) Oral at bedtime PRN  Omegaven 10grams/100 mL 60 Gram(s) 60 Gram(s) IV Continuous <Continuous>  ondansetron Injectable 4 milliGRAM(s) IV Push every 6 hours PRN  Parenteral Nutrition - Adult 1 Each TPN Continuous <Continuous>  Parenteral Nutrition - Adult 1 Each TPN Continuous <Continuous>  venlafaxine XR. 300 milliGRAM(s) Oral every 24 hours      Allergies    penicillin (Angioedema)    Intolerances        Vital Signs Last 24 Hrs  T(C): 36.2 (04 Jun 2024 08:25), Max: 37 (03 Jun 2024 22:06)  T(F): 97.1 (04 Jun 2024 08:25), Max: 98.6 (03 Jun 2024 22:06)  HR: 68 (04 Jun 2024 13:00) (68 - 89)  BP: 115/53 (04 Jun 2024 13:00) (89/51 - 140/65)  BP(mean): 77 (04 Jun 2024 13:00) (60 - 101)  RR: 19 (04 Jun 2024 13:00) (16 - 34)  SpO2: 98% (04 Jun 2024 13:00) (89% - 100%)    Parameters below as of 04 Jun 2024 13:00  Patient On (Oxygen Delivery Method): nasal cannula w/ humidification  O2 Flow (L/min): 4      06-03 @ 07:01  -  06-04 @ 07:00  --------------------------------------------------------  IN: 2551.6 mL / OUT: 3840 mL / NET: -1288.4 mL    06-04 @ 07:01  -  06-04 @ 13:28  --------------------------------------------------------  IN: 432 mL / OUT: 750 mL / NET: -318 mL          PHYSICAL EXAM:  Constitutional: appears tired, in mild respiratory distress  Head: NC/AT  EENT: PERRL, anicteric sclera; oropharynx clear, MMM  Neck: supple, no appreciable JVD  Respiratory: crackles at bases bilaterally   Cardiovascular: +S1/S2, RRR  Gastrointestinal: soft, NT/ND  Extremities: edema present  Vascular: 2+ radial pulses B/L  Neurological: awake and alert; PEARSON    LABS:      CBC Full  -  ( 04 Jun 2024 03:05 )  WBC Count : 10.57 K/uL  RBC Count : 2.49 M/uL  Hemoglobin : 7.5 g/dL  Hematocrit : 23.5 %  Platelet Count - Automated : 87 K/uL  Mean Cell Volume : 94.4 fl  Mean Cell Hemoglobin : 30.1 pg  Mean Cell Hemoglobin Concentration : 31.9 gm/dL  Auto Neutrophil # : 9.26 K/uL  Auto Lymphocyte # : 0.29 K/uL  Auto Monocyte # : 0.93 K/uL  Auto Eosinophil # : 0.10 K/uL  Auto Basophil # : 0.00 K/uL  Auto Neutrophil % : 87.6 %  Auto Lymphocyte % : 2.7 %  Auto Monocyte % : 8.8 %  Auto Eosinophil % : 0.9 %  Auto Basophil % : 0.0 %    06-04    138  |  102  |  69<H>  ----------------------------<  163<H>  4.2   |  27  |  1.67<H>    Ca    8.3<L>      04 Jun 2024 03:05  Phos  3.7     06-04  Mg     2.0     06-04    TPro  7.2  /  Alb  2.7<L>  /  TBili  11.6<H>  /  DBili  7.8<H>  /  AST  316<H>  /  ALT  41  /  AlkPhos  92  06-04    PT/INR - ( 03 Jun 2024 05:12 )   PT: 18.0 sec;   INR: 1.60          PTT - ( 03 Jun 2024 05:12 )  PTT:40.5 sec      Urinalysis Basic - ( 04 Jun 2024 03:05 )    Color: x / Appearance: x / SG: x / pH: x  Gluc: 163 mg/dL / Ketone: x  / Bili: x / Urobili: x   Blood: x / Protein: x / Nitrite: x   Leuk Esterase: x / RBC: x / WBC x   Sq Epi: x / Non Sq Epi: x / Bacteria: x                RADIOLOGY & ADDITIONAL STUDIES:

## 2024-06-04 NOTE — PROGRESS NOTE ADULT - SUBJECTIVE AND OBJECTIVE BOX
INTERVAL HPI/OVERNIGHT EVENTS:  ON: Nauseas, zofran given. CTA: No obstruction, bleed, evidence of portal HTN/varicies.     SUBJECTIVE:  Patient seen and examined by chief resident and team on AM rounds.     MEDICATIONS  (STANDING):  allopurinol 300 milliGRAM(s) Oral every 24 hours  atorvastatin 20 milliGRAM(s) Oral at bedtime  carvedilol 6.25 milliGRAM(s) Oral every 12 hours  ceFAZolin   IVPB 2000 milliGRAM(s) IV Intermittent every 12 hours  chlorhexidine 2% Cloths 1 Application(s) Topical <User Schedule>  cholestyramine Powder (Sugar-Free) 4 Gram(s) Oral every 24 hours  dextrose 10% Bolus 125 milliLiter(s) IV Bolus once  dextrose 5%. 1000 milliLiter(s) (100 mL/Hr) IV Continuous <Continuous>  dextrose 5%. 1000 milliLiter(s) (50 mL/Hr) IV Continuous <Continuous>  dextrose 50% Injectable 25 Gram(s) IV Push once  dextrose 50% Injectable 12.5 Gram(s) IV Push once  ergocalciferol 12616 Unit(s) Oral <User Schedule>  glucagon  Injectable 1 milliGRAM(s) IntraMuscular once  hydrocortisone Enema 100 milliGRAM(s) Rectal every 12 hours  insulin lispro (ADMELOG) corrective regimen sliding scale   SubCutaneous three times a day before meals  insulin lispro (ADMELOG) corrective regimen sliding scale   SubCutaneous at bedtime  levothyroxine 50 MICROGram(s) Oral every 24 hours  lipid, fat emulsion (Fish Oil and Plant Based) 20% Infusion 0.505 Gm/kG/Day (20.83 mL/Hr) IV Continuous <Continuous>  pancrelipase  (CREON 24,000 Lipase Units) 1 Capsule(s) Oral three times a day with meals  Parenteral Nutrition - Adult 1 Each (83 mL/Hr) TPN Continuous <Continuous>  ursodiol Capsule 300 milliGRAM(s) Oral every 8 hours  venlafaxine XR. 300 milliGRAM(s) Oral every 24 hours    MEDICATIONS  (PRN):  albuterol/ipratropium for Nebulization 3 milliLiter(s) Nebulizer every 6 hours PRN Shortness of Breath and/or Wheezing  dextrose Oral Gel 15 Gram(s) Oral once PRN Blood Glucose LESS THAN 70 milliGRAM(s)/deciliter  LORazepam   Injectable 0.25 milliGRAM(s) IV Push every 24 hours PRN Anxiety  melatonin 5 milliGRAM(s) Oral at bedtime PRN Insomnia  ondansetron Injectable 4 milliGRAM(s) IV Push every 6 hours PRN Nausea and/or Vomiting      Vital Signs Last 24 Hrs  T(C): 36.5 (04 Jun 2024 05:11), Max: 37 (03 Jun 2024 22:06)  T(F): 97.7 (04 Jun 2024 05:11), Max: 98.6 (03 Jun 2024 22:06)  HR: 70 (04 Jun 2024 05:00) (66 - 89)  BP: 126/60 (04 Jun 2024 05:00) (106/52 - 147/65)  BP(mean): 87 (04 Jun 2024 05:00) (60 - 101)  RR: 19 (04 Jun 2024 05:00) (16 - 32)  SpO2: 99% (04 Jun 2024 05:00) (70% - 100%)    Parameters below as of 04 Jun 2024 06:00      O2 Concentration (%): 40    PHYSICAL EXAM:      Constitutional: A&Ox3    Breasts:    Respiratory: non labored breathing, no respiratory distress    Cardiovascular: NSR, RRR    Gastrointestinal:                 Incision:    Genitourinary:    Extremities: (-) edema                  I&O's Detail    02 Jun 2024 07:01  -  03 Jun 2024 07:00  --------------------------------------------------------  IN:    Albumin 25%  -  50 mL: 150 mL    freetext medication - Infusion: 400 mL    IV PiggyBack: 50 mL    Oral Fluid: 100 mL    PRBCs (Packed Red Blood Cells): 350 mL    TPN (Total Parenteral Nutrition): 1826 mL  Total IN: 2876 mL    OUT:    Drain (mL): 550 mL    Ileostomy (mL): 10 mL    Voided (mL): 820 mL  Total OUT: 1380 mL    Total NET: 1496 mL      03 Jun 2024 07:01  -  04 Jun 2024 06:43  --------------------------------------------------------  IN:    Albumin 25%  -  50 mL: 150 mL    Fat Emulsion (Fish Oil &amp; Plant Based) 20% Infusion: 249.6 mL    IV PiggyBack: 50 mL    Other (mL): 60 mL    TPN (Total Parenteral Nutrition): 1826 mL  Total IN: 2335.6 mL    OUT:    Drain (mL): 1650 mL    Voided (mL): 1890 mL  Total OUT: 3540 mL    Total NET: -1204.4 mL          LABS:                        7.5    10.57 )-----------( 87       ( 04 Jun 2024 03:05 )             23.5     06-04    138  |  102  |  69<H>  ----------------------------<  163<H>  4.2   |  27  |  1.67<H>    Ca    8.3<L>      04 Jun 2024 03:05  Phos  3.7     06-04  Mg     2.0     06-04    TPro  7.2  /  Alb  2.7<L>  /  TBili  11.6<H>  /  DBili  7.8<H>  /  AST  316<H>  /  ALT  41  /  AlkPhos  92  06-04    PT/INR - ( 03 Jun 2024 05:12 )   PT: 18.0 sec;   INR: 1.60          PTT - ( 03 Jun 2024 05:12 )  PTT:40.5 sec  Urinalysis Basic - ( 04 Jun 2024 03:05 )    Color: x / Appearance: x / SG: x / pH: x  Gluc: 163 mg/dL / Ketone: x  / Bili: x / Urobili: x   Blood: x / Protein: x / Nitrite: x   Leuk Esterase: x / RBC: x / WBC x   Sq Epi: x / Non Sq Epi: x / Bacteria: x        RADIOLOGY & ADDITIONAL STUDIES: INTERVAL HPI/OVERNIGHT EVENTS:  ON: Nauseas, zofran given. CTA: No obstruction, bleed, evidence of portal HTN/varicies.     SUBJECTIVE:  Patient seen and examined by chief resident and team on AM rounds. Patient reports feeling sob, however appears comfortable. No bleeding from ECF site overnight.     MEDICATIONS  (STANDING):  allopurinol 300 milliGRAM(s) Oral every 24 hours  atorvastatin 20 milliGRAM(s) Oral at bedtime  carvedilol 6.25 milliGRAM(s) Oral every 12 hours  ceFAZolin   IVPB 2000 milliGRAM(s) IV Intermittent every 12 hours  chlorhexidine 2% Cloths 1 Application(s) Topical <User Schedule>  cholestyramine Powder (Sugar-Free) 4 Gram(s) Oral every 24 hours  dextrose 10% Bolus 125 milliLiter(s) IV Bolus once  dextrose 5%. 1000 milliLiter(s) (100 mL/Hr) IV Continuous <Continuous>  dextrose 5%. 1000 milliLiter(s) (50 mL/Hr) IV Continuous <Continuous>  dextrose 50% Injectable 25 Gram(s) IV Push once  dextrose 50% Injectable 12.5 Gram(s) IV Push once  ergocalciferol 43028 Unit(s) Oral <User Schedule>  glucagon  Injectable 1 milliGRAM(s) IntraMuscular once  hydrocortisone Enema 100 milliGRAM(s) Rectal every 12 hours  insulin lispro (ADMELOG) corrective regimen sliding scale   SubCutaneous three times a day before meals  insulin lispro (ADMELOG) corrective regimen sliding scale   SubCutaneous at bedtime  levothyroxine 50 MICROGram(s) Oral every 24 hours  lipid, fat emulsion (Fish Oil and Plant Based) 20% Infusion 0.505 Gm/kG/Day (20.83 mL/Hr) IV Continuous <Continuous>  pancrelipase  (CREON 24,000 Lipase Units) 1 Capsule(s) Oral three times a day with meals  Parenteral Nutrition - Adult 1 Each (83 mL/Hr) TPN Continuous <Continuous>  ursodiol Capsule 300 milliGRAM(s) Oral every 8 hours  venlafaxine XR. 300 milliGRAM(s) Oral every 24 hours    MEDICATIONS  (PRN):  albuterol/ipratropium for Nebulization 3 milliLiter(s) Nebulizer every 6 hours PRN Shortness of Breath and/or Wheezing  dextrose Oral Gel 15 Gram(s) Oral once PRN Blood Glucose LESS THAN 70 milliGRAM(s)/deciliter  LORazepam   Injectable 0.25 milliGRAM(s) IV Push every 24 hours PRN Anxiety  melatonin 5 milliGRAM(s) Oral at bedtime PRN Insomnia  ondansetron Injectable 4 milliGRAM(s) IV Push every 6 hours PRN Nausea and/or Vomiting      Vital Signs Last 24 Hrs  T(C): 36.5 (04 Jun 2024 05:11), Max: 37 (03 Jun 2024 22:06)  T(F): 97.7 (04 Jun 2024 05:11), Max: 98.6 (03 Jun 2024 22:06)  HR: 70 (04 Jun 2024 05:00) (66 - 89)  BP: 126/60 (04 Jun 2024 05:00) (106/52 - 147/65)  BP(mean): 87 (04 Jun 2024 05:00) (60 - 101)  RR: 19 (04 Jun 2024 05:00) (16 - 32)  SpO2: 99% (04 Jun 2024 05:00) (70% - 100%)    Parameters below as of 04 Jun 2024 06:00      O2 Concentration (%): 40    PHYSICAL EXAM:  Constitutional: A&Ox3. NAD  HEENT: MMM, PEERLA  Respiratory: non labored breathing, no respiratory distress. Appears uncomfortable on nasal cannula satting 96% on 5L.   Cardiovascular: NSR, RRR  Gastrointestinal: soft, NTND abdomen. Ileostomy without any output. ECF wound bed without stigmata of recent bleed, light brown fluid in bag. Fistula present pink and well perfused.   Extremities: WWP, + BLE pitting edema      I&O's Detail    02 Jun 2024 07:01  -  03 Jun 2024 07:00  --------------------------------------------------------  IN:    Albumin 25%  -  50 mL: 150 mL    freetext medication - Infusion: 400 mL    IV PiggyBack: 50 mL    Oral Fluid: 100 mL    PRBCs (Packed Red Blood Cells): 350 mL    TPN (Total Parenteral Nutrition): 1826 mL  Total IN: 2876 mL    OUT:    Drain (mL): 550 mL    Ileostomy (mL): 10 mL    Voided (mL): 820 mL  Total OUT: 1380 mL    Total NET: 1496 mL      03 Jun 2024 07:01  -  04 Jun 2024 06:43  --------------------------------------------------------  IN:    Albumin 25%  -  50 mL: 150 mL    Fat Emulsion (Fish Oil &amp; Plant Based) 20% Infusion: 249.6 mL    IV PiggyBack: 50 mL    Other (mL): 60 mL    TPN (Total Parenteral Nutrition): 1826 mL  Total IN: 2335.6 mL    OUT:    Drain (mL): 1650 mL    Voided (mL): 1890 mL  Total OUT: 3540 mL    Total NET: -1204.4 mL          LABS:                        7.5    10.57 )-----------( 87       ( 04 Jun 2024 03:05 )             23.5     06-04    138  |  102  |  69<H>  ----------------------------<  163<H>  4.2   |  27  |  1.67<H>    Ca    8.3<L>      04 Jun 2024 03:05  Phos  3.7     06-04  Mg     2.0     06-04    TPro  7.2  /  Alb  2.7<L>  /  TBili  11.6<H>  /  DBili  7.8<H>  /  AST  316<H>  /  ALT  41  /  AlkPhos  92  06-04    PT/INR - ( 03 Jun 2024 05:12 )   PT: 18.0 sec;   INR: 1.60          PTT - ( 03 Jun 2024 05:12 )  PTT:40.5 sec  Urinalysis Basic - ( 04 Jun 2024 03:05 )    Color: x / Appearance: x / SG: x / pH: x  Gluc: 163 mg/dL / Ketone: x  / Bili: x / Urobili: x   Blood: x / Protein: x / Nitrite: x   Leuk Esterase: x / RBC: x / WBC x   Sq Epi: x / Non Sq Epi: x / Bacteria: x        RADIOLOGY & ADDITIONAL STUDIES:

## 2024-06-04 NOTE — PROGRESS NOTE ADULT - SUBJECTIVE AND OBJECTIVE BOX
Hepatology Consult Progress Note:     OVERNIGHT EVENTS: AMRITA.    SUBJECTIVE / INTERVAL HPI:  Patient seen and examined at bedside. Currently tolerating Coreg and still receiving octreotide with TPN. Underwent CT angio yesterday to try to identify ectopic varix for sclerosis or embolization.       VITAL SIGNS:  Vital Signs Last 24 Hrs  T(C): 36.2 (04 Jun 2024 08:25), Max: 37 (03 Jun 2024 22:06)  T(F): 97.1 (04 Jun 2024 08:25), Max: 98.6 (03 Jun 2024 22:06)  HR: 70 (04 Jun 2024 09:00) (69 - 89)  BP: 138/66 (04 Jun 2024 09:00) (89/51 - 147/65)  BP(mean): 95 (04 Jun 2024 09:00) (60 - 101)  RR: 17 (04 Jun 2024 09:00) (16 - 34)  SpO2: 100% (04 Jun 2024 09:00) (70% - 100%)    Parameters below as of 04 Jun 2024 09:00  Patient On (Oxygen Delivery Method): BiPAP/CPAP    O2 Concentration (%): 40    06-03-24 @ 07:01  -  06-04-24 @ 07:00  --------------------------------------------------------  IN: 2551.6 mL / OUT: 3840 mL / NET: -1288.4 mL    06-04-24 @ 07:01  -  06-04-24 @ 09:34  --------------------------------------------------------  IN: 166 mL / OUT: 200 mL / NET: -34 mL      PHYSICAL EXAM:  General: No acute distress, mildly jaundiced   Lungs: Normal respiratory effort and no intercostal retractions, comfortable on BiPAP  Cardiovascular: RRR  Abdomen: Soft, non-tender, non-distended, +ileostomy, +fistula site covered by ostomy bag   Neurological: Alert and oriented x3  Skin: Warm and dry. No obvious rash      MEDICATIONS:  MEDICATIONS  (STANDING):  allopurinol 300 milliGRAM(s) Oral every 24 hours  atorvastatin 20 milliGRAM(s) Oral at bedtime  carvedilol 6.25 milliGRAM(s) Oral every 12 hours  ceFAZolin   IVPB 2000 milliGRAM(s) IV Intermittent every 12 hours  chlorhexidine 2% Cloths 1 Application(s) Topical <User Schedule>  cholestyramine Powder (Sugar-Free) 4 Gram(s) Oral every 24 hours  dextrose 10% Bolus 125 milliLiter(s) IV Bolus once  dextrose 5%. 1000 milliLiter(s) (100 mL/Hr) IV Continuous <Continuous>  dextrose 5%. 1000 milliLiter(s) (50 mL/Hr) IV Continuous <Continuous>  dextrose 50% Injectable 25 Gram(s) IV Push once  dextrose 50% Injectable 12.5 Gram(s) IV Push once  ergocalciferol 81775 Unit(s) Oral <User Schedule>  glucagon  Injectable 1 milliGRAM(s) IntraMuscular once  hydrocortisone Enema 100 milliGRAM(s) Rectal every 12 hours  insulin lispro (ADMELOG) corrective regimen sliding scale   SubCutaneous three times a day before meals  insulin lispro (ADMELOG) corrective regimen sliding scale   SubCutaneous at bedtime  levothyroxine 50 MICROGram(s) Oral every 24 hours  lipid, fat emulsion (Fish Oil and Plant Based) 20% Infusion 0.505 Gm/kG/Day (20.83 mL/Hr) IV Continuous <Continuous>  Omegaven 10grams/100 mL 60 Gram(s) 60 Gram(s) (50 mL/Hr) IV Continuous <Continuous>  pancrelipase  (CREON 24,000 Lipase Units) 1 Capsule(s) Oral three times a day with meals  Parenteral Nutrition - Adult 1 Each (83 mL/Hr) TPN Continuous <Continuous>  ursodiol Capsule 300 milliGRAM(s) Oral every 8 hours  venlafaxine XR. 300 milliGRAM(s) Oral every 24 hours    MEDICATIONS  (PRN):  albuterol/ipratropium for Nebulization 3 milliLiter(s) Nebulizer every 6 hours PRN Shortness of Breath and/or Wheezing  dextrose Oral Gel 15 Gram(s) Oral once PRN Blood Glucose LESS THAN 70 milliGRAM(s)/deciliter  LORazepam   Injectable 0.25 milliGRAM(s) IV Push every 24 hours PRN Anxiety  melatonin 5 milliGRAM(s) Oral at bedtime PRN Insomnia  ondansetron Injectable 4 milliGRAM(s) IV Push every 6 hours PRN Nausea and/or Vomiting      ALLERGIES:  Allergies    penicillin (Angioedema)    Intolerances        LABS:                        7.5    10.57 )-----------( 87       ( 04 Jun 2024 03:05 )             23.5     06-04    138  |  102  |  69<H>  ----------------------------<  163<H>  4.2   |  27  |  1.67<H>    Ca    8.3<L>      04 Jun 2024 03:05  Phos  3.7     06-04  Mg     2.0     06-04    TPro  7.2  /  Alb  2.7<L>  /  TBili  11.6<H>  /  DBili  7.8<H>  /  AST  316<H>  /  ALT  41  /  AlkPhos  92  06-04    PT/INR - ( 03 Jun 2024 05:12 )   PT: 18.0 sec;   INR: 1.60          PTT - ( 03 Jun 2024 05:12 )  PTT:40.5 sec  Urinalysis Basic - ( 04 Jun 2024 03:05 )    Color: x / Appearance: x / SG: x / pH: x  Gluc: 163 mg/dL / Ketone: x  / Bili: x / Urobili: x   Blood: x / Protein: x / Nitrite: x   Leuk Esterase: x / RBC: x / WBC x   Sq Epi: x / Non Sq Epi: x / Bacteria: x      CAPILLARY BLOOD GLUCOSE      POCT Blood Glucose.: 151 mg/dL (04 Jun 2024 08:36)  RADIOLOGY & ADDITIONAL TESTS: Reviewed.

## 2024-06-04 NOTE — PROGRESS NOTE ADULT - SUBJECTIVE AND OBJECTIVE BOX
INTERVAL/OVERNIGHT EVENTS: CTA read negative for obstruction, bleed, evidence of portal HTN/varicies.    SUBJECTIVE: This AM, doing well without complaints. No pain. Does still have SOB requiring BiPAP. No CP. Voids without issues.    Neurologic Medications  LORazepam   Injectable 0.25 milliGRAM(s) IV Push every 24 hours PRN Anxiety  melatonin 5 milliGRAM(s) Oral at bedtime PRN Insomnia  ondansetron Injectable 4 milliGRAM(s) IV Push every 6 hours PRN Nausea and/or Vomiting  venlafaxine XR. 300 milliGRAM(s) Oral every 24 hours    Respiratory Medications  albuterol/ipratropium for Nebulization 3 milliLiter(s) Nebulizer every 6 hours PRN Shortness of Breath and/or Wheezing    Cardiovascular Medications  carvedilol 6.25 milliGRAM(s) Oral every 12 hours    Gastrointestinal Medications  ergocalciferol 95967 Unit(s) Oral <User Schedule>  pancrelipase  (CREON 24,000 Lipase Units) 1 Capsule(s) Oral three times a day with meals  Parenteral Nutrition - Adult 1 Each TPN Continuous <Continuous>  ursodiol Capsule 300 milliGRAM(s) Oral every 8 hours    Antimicrobial/Immunologic Medications  ceFAZolin   IVPB 2000 milliGRAM(s) IV Intermittent every 12 hours    Endocrine/Metabolic Medications  allopurinol 300 milliGRAM(s) Oral every 24 hours  atorvastatin 20 milliGRAM(s) Oral at bedtime  cholestyramine Powder (Sugar-Free) 4 Gram(s) Oral every 24 hours  hydrocortisone Enema 100 milliGRAM(s) Rectal every 12 hours  insulin lispro (ADMELOG) corrective regimen sliding scale   SubCutaneous three times a day before meals  insulin lispro (ADMELOG) corrective regimen sliding scale   SubCutaneous at bedtime  levothyroxine 50 MICROGram(s) Oral every 24 hours    Topical/Other Medications  chlorhexidine 2% Cloths 1 Application(s) Topical <User Schedule>  Omegaven 10grams/100 mL 60 Gram(s) 60 Gram(s) IV Continuous <Continuous>    I&O's Detail    03 Jun 2024 07:01  -  04 Jun 2024 07:00  --------------------------------------------------------  IN:    Albumin 25%  -  50 mL: 150 mL    Fat Emulsion (Fish Oil &amp; Plant Based) 20% Infusion: 249.6 mL    IV PiggyBack: 100 mL    Other (mL): 60 mL    TPN (Total Parenteral Nutrition): 1992 mL  Total IN: 2551.6 mL    OUT:    Drain (mL): 1950 mL    Voided (mL): 1890 mL  Total OUT: 3840 mL    Total NET: -1288.4 mL    04 Jun 2024 07:01  -  04 Jun 2024 13:58  --------------------------------------------------------  IN:    Oral Fluid: 100 mL    TPN (Total Parenteral Nutrition): 332 mL  Total IN: 432 mL    OUT:    Drain (mL): 500 mL    Voided (mL): 250 mL  Total OUT: 750 mL    Total NET: -318 mL    Vital Signs Last 24 Hrs  T(C): 35.9 (04 Jun 2024 13:03), Max: 37 (03 Jun 2024 22:06)  T(F): 96.6 (04 Jun 2024 13:03), Max: 98.6 (03 Jun 2024 22:06)  HR: 68 (04 Jun 2024 13:00) (68 - 89)  BP: 115/53 (04 Jun 2024 13:00) (89/51 - 140/65)  BP(mean): 77 (04 Jun 2024 13:00) (60 - 101)  RR: 19 (04 Jun 2024 13:00) (16 - 34)  SpO2: 98% (04 Jun 2024 13:00) (89% - 100%)    Parameters below as of 04 Jun 2024 13:00  Patient On (Oxygen Delivery Method): nasal cannula w/ humidification  O2 Flow (L/min): 4    HEENT: NC/AT, EOMI, PERRLA, normal hearing, no oral lesions, neck supple w/o LAD; scleral icterus, jaundice  Pulmonary: mildly distressed, on BiPAP. intermittent tachypnea; clear to auscultation b/l. Addendum 12pm: s/p L thoracentesis--comfortable on 1L NC. decreased breath sounds b/l.  Cardiovascular: NSR, no murmurs  Abdominal: soft, RUQ stoma involuted without stigmata of bleeding; LUQ fistula with bilious drainage.  Extremities: WWP, pitting edema b/l lower extremity to knees  Neuro: A/O x3, CNs II-XII grossly intact, normal motor/sensation, no focal deficits  Pulses: palpable distal pulses    LABS:                        7.5    10.57 )-----------( 87       ( 04 Jun 2024 03:05 )             23.5     06-04    138  |  102  |  69<H>  ----------------------------<  163<H>  4.2   |  27  |  1.67<H>    Ca    8.3<L>      04 Jun 2024 03:05  Phos  3.7     06-04  Mg     2.0     06-04    TPro  7.2  /  Alb  2.7<L>  /  TBili  11.6<H>  /  DBili  7.8<H>  /  AST  316<H>  /  ALT  41  /  AlkPhos  92  06-04    PT/INR - ( 03 Jun 2024 05:12 )   PT: 18.0 sec;   INR: 1.60          PTT - ( 03 Jun 2024 05:12 )  PTT:40.5 sec  Urinalysis Basic - ( 04 Jun 2024 03:05 )    Color: x / Appearance: x / SG: x / pH: x  Gluc: 163 mg/dL / Ketone: x  / Bili: x / Urobili: x   Blood: x / Protein: x / Nitrite: x   Leuk Esterase: x / RBC: x / WBC x   Sq Epi: x / Non Sq Epi: x / Bacteria: x

## 2024-06-04 NOTE — PROGRESS NOTE ADULT - ASSESSMENT
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 Prolonged hospital course significant for recurrent AKIs, cholestatic transaminitis s/p perc cony, prior DVTs, PNAs, fungemia, sinus pauses s/p pacemaker, return to [facility] multiple times after discharge in Feb2024 for recurrent bleeding from ileostomy, fistula and wound bed, most recently s/p IR embolization of L branch of inferior epigastric artery via R groin access (4/16), S/p ileoscopy, found an ulcer w/ clot proximal to stoma w/ hemoclip applied (5/1). most recently transferred to SICU 5/8 overnight for 2L blood loss from fistula site with subsequent hypotension. s/p ileoscopy, noting erythematous patch of bowel just deep to the ECF, without any active bleeding (5/9). S/p IR angiography noting several areas of hyperemia in the jejunum adjacent to the stoma however no evidence of active bleeding (5/9), also noted MSSA bacteremia (5/9) likely due to line sepsis/PICC infection, started on Ancef, portal vein thrombosis per US from 5/13/24, but not candidate for anticoagulation due to persistent bleeding. Ultimately transferred from [facility] to [facility] 5/20 for possible surgical intervention for persist bleeding, but deemed not candidate for surgery due to portal hypertension. pt readmitted to [facility] Medicine tele on 5/29 for fistula wound bed bleeding. s/p PRBC transfusion. Pt transferred from [facility] to SICU on 6/1 for persistent fistula wound bed bleeding with acute blood loss anemia for close management. With total body overload, s/p L thoracentesis (6/4).     Neuro: Pain: Dilaudid PRN. Hx: Anxiety: Continue Effexor and Ativan PRN. Ambien on hold. Nausea: Zofran PRN.  CV: maintaining MAPs > 65 without pressors. Hx AFib: cont coreg, no AC given recurrent unprovoked bleed. Hx HLD: cont Atorvastatin. Now s/p albumin 25%. Total body overload.  Pulm: Pleural Effusions b/l, no resp distress on Room air, nightly BIPAP at 40%. POCUS as needed, L thoracentesis (6/4). Bumex 1mg given.   GI: persistent elevated T bili s/p Hepatology consults and workup in past, US with portal vein thrombosis 5/13, but unable to anticoagulate due to recurrent bleeding. Cont cholestyramine. C/w Ursodiol. Cont Creon. cont octreotide (in TPN), Crohns: cont hydrocortisone enemas via ileostomy, Continue TPN, smofflipids on Monday, Omegaven on Tues to Sunday.  holding off on remicade in setting of MSSA bacteremia. Dr Zhao consulted on interventional options for portal htn.   : Voids. BUN/Cr up slightly, cont monitor.   ID: MSSA Bacteremia MSSA from BCx (5/9), TTE negative for vegetation. 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 // PREVIOUS: Cefepime (5/10), Vanc x1 (5/9)  Endo: mISSc. Hx: Hypothyroidism. Continue synthroid. insulin in TPN, watch fingerstick.   PPx: SCDs, SQH on hold for bleeding.   Heme: acute blood loss anemia, likely due to EC fistula area bleed. Received multiple PRBCs, FFP, plt and cryo in recent admission. seen by Heme in recent admission. Hgb 7.2 this morning with bleeding from fistula wound bed area last night. transfuse 1u PRBC.   Wounds: Wound manager managed by Wound care team.   Lines: double lumen L PICC (5/17--),   PT: OOBTC daily.   Dispo: SICU

## 2024-06-04 NOTE — PROCEDURE NOTE - SUPERVISORY STATEMENT
Uncomplicated L thora performed by us today at bedside under US guidance yielding 980cc serosanguinous fluid. Will follow up fluid studies.

## 2024-06-04 NOTE — ADVANCED PRACTICE NURSE CONSULT - ASSESSMENT
Check in on fistula pouch - intact without leakage or bleeding. +thin brown effluent, suctioned out.    Supplies at bedside in case pt has any issues over the weekend. Private hire nurses are comfortable with pouch changes. 
Sitting up on side of bed had caused appliance to leak so new Coloplast fistula/wound manager with window placed. Medium-sized blood clot noted in pouch, strong odor noted when appliance removed. Denuded area from 7-10 o'clock, fistulas appear smaller than last assessment. Bleeding at 9 o'clock, surgicell applied x 3 and pressure held. Periwound protected with Cavilon barrier wipes and stoma rings prior to placing appliance. 
Readmit from [facility]; per private RN, current fistula pouch last changed 5/25 or 5/26    RLQ ileostomy: pinpoint opening, thin light brown effluent. Peristomal skin intact.   Fistula: stoma significantly larger encompassing the majority of the peristomal wound, small bleeding controlled with surgicel. Large amt of thin light brown effluent.  Fungal rash resolved.     New Coloplast pouch #[id number] with window applied around wound/fistula. Stoma powder applied then sealed in with Cavilon powder. Periwound protected with stoma rings and stoma paste prior to placing pouch.   Ileostomy pouched with 2 piece 1 3/4" prabhakar with adapt rings in creases. 
no

## 2024-06-04 NOTE — PROGRESS NOTE ADULT - ASSESSMENT
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 Prolonged hospital course significant for recurrent AKIs, cholestatic transaminitis s/p perc cony, prior DVTs, PNAs, fungemia, sinus pauses s/p pacemaker, return to [facility] multiple times after discharge in Feb2024 for recurrent bleeding from ileostomy, fistula and wound bed, most recently s/p IR embolization of L branch of inferior epigastric artery via R groin access (4/16), S/p ileoscopy, found an ulcer w/ clot proximal to stoma w/ hemoclip applied (5/1). most recently transferred to SICU 5/8 overnight for 2L blood loss from fistula site with subsequent hypotension. s/p ileoscopy, noting erythematous patch of bowel just deep to the ECF, without any active bleeding (5/9). S/p IR angiography noting several areas of hyperemia in the jejunum adjacent to the stoma however no evidence of active bleeding (5/9), also noted MSSA bacteremia (5/9) likely due to line sepsis/PICC infection, started on Ancef, portal vein thrombosis per US from 5/13/24, but not candidate for anticoagulation due to persistent bleeding. Ultimately transferred from [facility] to [facility] 5/20 for possible surgical intervention for persist bleeding, but deemed not candidate for surgery due to portal hypertension. pt readmitted to [facility] Medicine tele on 5/29 for fistula wound bed bleeding. s/p PRBC transfusion. Pt transferred from [facility] to SICU on 6/1 for persistent fistula wound bed bleeding with acute blood loss anemia for close management.     Plan:  - IR recs  - F/u hepatology recs  - F/u Dr. Pate plastics recs  - Rest of care as per SICU team  Plan discussed with chief resident and attending, Dr. Peterson.  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 Prolonged hospital course significant for recurrent AKIs, cholestatic transaminitis s/p perc cony, prior DVTs, PNAs, fungemia, sinus pauses s/p pacemaker, return to [facility] multiple times after discharge in Feb2024 for recurrent bleeding from ileostomy, fistula and wound bed, most recently s/p IR embolization of L branch of inferior epigastric artery via R groin access (4/16), S/p ileoscopy, found an ulcer w/ clot proximal to stoma w/ hemoclip applied (5/1). most recently transferred to SICU 5/8 overnight for 2L blood loss from fistula site with subsequent hypotension. s/p ileoscopy, noting erythematous patch of bowel just deep to the ECF, without any active bleeding (5/9). S/p IR angiography noting several areas of hyperemia in the jejunum adjacent to the stoma however no evidence of active bleeding (5/9), also noted MSSA bacteremia (5/9) likely due to line sepsis/PICC infection, started on Ancef, portal vein thrombosis per US from 5/13/24, but not candidate for anticoagulation due to persistent bleeding. Ultimately transferred from [facility] to [facility] 5/20 for possible surgical intervention for persist bleeding, but deemed not candidate for surgery due to portal hypertension. pt readmitted to [facility] Medicine tele on 5/29 for fistula wound bed bleeding. s/p PRBC transfusion. Pt transferred from [facility] to SICU on 6/1 for persistent fistula wound bed bleeding with acute blood loss anemia for close management.     Plan:  - diuresis as per SICU team vs thoracentesis  - IR recs  - F/u hepatology recs  - F/u Dr. Pate plastics recs  - Rest of care as per SICU team  Plan discussed with chief resident and attending, Dr. Peterson.

## 2024-06-04 NOTE — PROGRESS NOTE ADULT - SUBJECTIVE AND OBJECTIVE BOX
Vital signs are stable  Having difficulty with shortness of breath  Requiring constant BiPAP  Critical-care involved  Liver function deteriorating    CTA without obstruction

## 2024-06-05 NOTE — PROGRESS NOTE ADULT - ASSESSMENT
77 y/o M with complex GI history including portal hypertension of unclear etiology and prolonged hospitalizaiton from  to 02/2024, presents with worsening dyspnea and lethargy, now on BiPAP. He appears clinically fluid overloaded with bilateral pleural effusions. He had these effusions for months, and a chest tube was placed last year, no studies are available. Pulmonary consulted to evaluate pleural effusions.    Effusions are simple in appearance relatively equal on both sides. Pleural fluid studies consistent with transudate by Light's criteria, awaiting other studies such as cholesterol, cytology. Patient felt some improvement in breathing with thoracentesis. Discussed with primary team, may be transitioning to more palliative care. At this time we will sign off. Please reconsult if there any new questions or need for additional thoracentesis.     Plan:  - post thoracentesis CXR showed no pneumothorax.   - follow up remaining pleural fluid studies      Thank you for the interesting consult.  Patient seen, evaluated by fellow, and plan disucssed with [facility] attending Dr. Amador.   Pulmonary team will sign off. Please call, page or place new consult with any new questions.

## 2024-06-05 NOTE — PROGRESS NOTE ADULT - ATTENDING COMMENTS
After discussion with primary SICU team decision made not to round on patient - effusion confirmed to be transudative, there was benefit from drainage and discussions ongoing re: palliative approach. Can consider contralateral thoracentesis if team feels it will benefit's patient's comfort. Please recall as needed.

## 2024-06-05 NOTE — PROGRESS NOTE ADULT - ASSESSMENT
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 Prolonged hospital course significant for recurrent AKIs, cholestatic transaminitis s/p perc cony, prior DVTs, PNAs, fungemia, sinus pauses s/p pacemaker, return to [facility] multiple times after discharge in Feb2024 for recurrent bleeding from ileostomy, fistula and wound bed, most recently s/p IR embolization of L branch of inferior epigastric artery via R groin access (4/16), S/p ileoscopy, found an ulcer w/ clot proximal to stoma w/ hemoclip applied (5/1). most recently transferred to SICU 5/8 overnight for 2L blood loss from fistula site with subsequent hypotension. s/p ileoscopy, noting erythematous patch of bowel just deep to the ECF, without any active bleeding (5/9). S/p IR angiography noting several areas of hyperemia in the jejunum adjacent to the stoma however no evidence of active bleeding (5/9), also noted MSSA bacteremia (5/9) likely due to line sepsis/PICC infection, started on Ancef, portal vein thrombosis per US from 5/13/24, but not candidate for anticoagulation due to persistent bleeding. Ultimately transferred from [facility] to [facility] 5/20 for possible surgical intervention for persist bleeding, but deemed not candidate for surgery due to portal hypertension. pt readmitted to [facility] Medicine tele on 5/29 for fistula wound bed bleeding. s/p PRBC transfusion. Pt transferred from [facility] to SICU on 6/1 for persistent fistula wound bed bleeding with acute blood loss anemia for close management. With total body overload, s/p L thoracentesis (6/4).     Plan:  - palliative care consult  - wean off BIPAP as tolerated  - Rest of care as per SICU team  Plan discussed with chief resident and attending, Dr. Peterson

## 2024-06-05 NOTE — CONSULT NOTE ADULT - PROBLEM SELECTOR RECOMMENDATION 5
.  Complex medical decision making / symptom management in the setting of critical illness.    Will continue to follow for ongoing GOC discussion / titration of palliative regimen. Emotional support provided, questions answered.  Active Psychosocial Referrals:  [x]Social Work/Case management [x]PT/OT []Chaplaincy []Hospice  [x]Patient/Family Support []Holistic RN [x]Massage Therapy [x]Music Therapy []Ethics  Coping: [x] well [] with difficulty [] poor coping [] unable to assess  Support system: [x] strong [] adequate [] inadequate    For new or uncontrolled symptoms, please call Palliative Care at [phone]). The service is available 24/7 (including nights & weekends) to provide symptom management recommendations over the phone as appropriate

## 2024-06-05 NOTE — PROGRESS NOTE ADULT - ASSESSMENT
78M with PMH of a fib/flutter with numerous DCCVs in the past with prolonged admission last year for Crohn's SBO (s/p open TRE and SBR c/b abdominal wall dehiscence and development of enteroatmospheric fistula), now presenting after recent admission from [place], with bleeding from fistula site and a new diagnosis of portal hypertension based off of liver biopsy done at [place] (wedge pressure of 17). Hepatology consulted for bleeding from ostomy and further management of portal hypertension.     Symptoms possibly 2/2 to bleeding from an ectopic varix. Tolerating Coreg well. Has had multiple episodes of bleeding now from fistula site, so IR (Dr. Zhao) was consulted.     Meld 3.0 score: 30 based on 06/05/2024 labs.     CT angio (06/03/24):   LIVER: Scattered hepatic cysts. No portal hypertension or varices.  BILE DUCTS: Nondilated.  GALLBLADDER: Normal.  SPLEEN: Normal.  PANCREAS: Mild diffuse duct dilatation.  BOWEL: Status post right hemicolectomy and ileostomy. No bowel obstruction. No active GI bleeding.  ABDOMINAL WALL: Open surgical wound.  IMPRESSION:  *  No bowel obstruction or active GI bleeding.  *  No definite evidence of portal hypertension or varices.    Recommendations:   - continue Coreg 6.25 mg PO BID and can up-titrate as blood pressure permits to better reduce portal HTN   - try Coreg 9.375 mg PO BID today to see if patient is able to tolerate slightly increased dose   - continue octreotide in TPN for bleeding related to portal hypertension   - IR consulted (Dr. Zhao) to determine if an intervention could be performed on an ectopic varix (sclerosis, embolization)   - patient would be a poor candidate for a TIPs procedure   - trend CBC, CMP, and INR daily     Case discussed with Dr. Carrillo. Hepatology Team will continue to follow.     Esme Barber D.O.   Gastroenterology Fellow  Weekday 7am-5pm Pager: [phone]  Weeknights/Weekend/Holiday Coverage: Please call the  for contact info 78M with PMH of a fib/flutter with numerous DCCVs in the past with prolonged admission last year for Crohn's SBO (s/p open TRE and SBR c/b abdominal wall dehiscence and development of enteroatmospheric fistula), now presenting after recent admission from [place], with bleeding from fistula site and a new diagnosis of portal hypertension based off of liver biopsy done at [place] (wedge pressure of 17). Hepatology consulted for bleeding from ostomy and further management of portal hypertension.     Symptoms possibly 2/2 to bleeding from an ectopic varix. Tolerating Coreg well. Has had multiple episodes of bleeding now from fistula site, so IR (Dr. Zhao) was consulted.     Meld 3.0 score: 30 based on 06/05/2024 labs.     CT angio (06/03/24):   LIVER: Scattered hepatic cysts. No portal hypertension or varices.  BILE DUCTS: Nondilated.  GALLBLADDER: Normal.  SPLEEN: Normal.  PANCREAS: Mild diffuse duct dilatation.  BOWEL: Status post right hemicolectomy and ileostomy. No bowel obstruction. No active GI bleeding.  ABDOMINAL WALL: Open surgical wound.  IMPRESSION:  *  No bowel obstruction or active GI bleeding.  *  No definite evidence of portal hypertension or varices.    Recommendations:   - continue Coreg 6.25 mg PO BID and can up-titrate as blood pressure permits to better reduce portal HTN   - try Coreg 9.375 mg PO BID today to see if patient is able to tolerate slightly increased dose   - continue octreotide in TPN for bleeding related to portal hypertension   - IR consulted (Dr. Zhao) to determine if an intervention could be performed on an ectopic varix (sclerosis, embolization)   - patient would be a poor candidate for a TIPs procedure   - trend CBC, CMP, and INR daily   - repeat echocardiogram     Case discussed with Dr. Carrillo. Hepatology Team will continue to follow.     Esme Barber D.O.   Gastroenterology Fellow  Weekday 7am-5pm Pager: [phone]  Weeknights/Weekend/Holiday Coverage: Please call the  for contact info

## 2024-06-05 NOTE — CONSULT NOTE ADULT - PROBLEM SELECTOR RECOMMENDATION 3
.  Complicated by ECF, non-healing wound, and recurrent bleeding  -current no surgical interventions planned  -requiring frequent transfusions for bleeding

## 2024-06-05 NOTE — PROGRESS NOTE ADULT - SUBJECTIVE AND OBJECTIVE BOX
Hepatology Consult Progress Note:     OVERNIGHT EVENTS: AMRITA.    SUBJECTIVE / INTERVAL HPI:   Patient seen and examined at bedside. No acute complaints at this time. Just states that he is hungry and would like to eat. Underwent thoracentesis yesterday and nearly 1L of fluid removed, so patient reports that his breathing is much improved.       VITAL SIGNS:  Vital Signs Last 24 Hrs  T(C): 36.6 (05 Jun 2024 09:00), Max: 37 (05 Jun 2024 05:11)  T(F): 97.9 (05 Jun 2024 09:00), Max: 98.6 (05 Jun 2024 05:11)  HR: 70 (05 Jun 2024 09:00) (67 - 77)  BP: 108/54 (05 Jun 2024 09:00) (95/46 - 134/63)  BP(mean): 78 (05 Jun 2024 09:00) (66 - 91)  RR: 18 (05 Jun 2024 09:00) (15 - 25)  SpO2: 99% (05 Jun 2024 09:00) (92% - 100%)    Parameters below as of 05 Jun 2024 09:00  Patient On (Oxygen Delivery Method): BiPAP/CPAP    O2 Concentration (%): 40    06-04-24 @ 07:01  -  06-05-24 @ 07:00  --------------------------------------------------------  IN: 3172 mL / OUT: 5805 mL / NET: -2633 mL    06-05-24 @ 07:01  -  06-05-24 @ 09:45  --------------------------------------------------------  IN: 299 mL / OUT: 400 mL / NET: -101 mL      PHYSICAL EXAM:  General: No acute distress, mildly jaundiced   Lungs: Normal respiratory effort and no intercostal retractions, comfortable on BiPAP  Cardiovascular: RRR  Abdomen: Soft, non-tender, non-distended, +ileostomy, +fistula site covered by ostomy bag   Neurological: Alert and oriented x3  Skin: Warm and dry. No obvious rash      MEDICATIONS:  MEDICATIONS  (STANDING):  allopurinol 300 milliGRAM(s) Oral every 24 hours  atorvastatin 20 milliGRAM(s) Oral at bedtime  carvedilol 6.25 milliGRAM(s) Oral every 12 hours  ceFAZolin   IVPB 2000 milliGRAM(s) IV Intermittent every 12 hours  chlorhexidine 2% Cloths 1 Application(s) Topical <User Schedule>  cholestyramine Powder (Sugar-Free) 4 Gram(s) Oral every 24 hours  dextrose 10% Bolus 125 milliLiter(s) IV Bolus once  dextrose 5%. 1000 milliLiter(s) (100 mL/Hr) IV Continuous <Continuous>  dextrose 5%. 1000 milliLiter(s) (50 mL/Hr) IV Continuous <Continuous>  dextrose 50% Injectable 25 Gram(s) IV Push once  dextrose 50% Injectable 12.5 Gram(s) IV Push once  epoetin matt (EPOGEN) Injectable 38216 Unit(s) IV Push every 7 days  ergocalciferol 07038 Unit(s) Oral <User Schedule>  glucagon  Injectable 1 milliGRAM(s) IntraMuscular once  hydrocortisone Enema 100 milliGRAM(s) Rectal every 12 hours  insulin lispro (ADMELOG) corrective regimen sliding scale   SubCutaneous three times a day before meals  insulin lispro (ADMELOG) corrective regimen sliding scale   SubCutaneous at bedtime  levothyroxine 50 MICROGram(s) Oral every 24 hours  Omegaven 10grams/100 mL 60 Gram(s) 60 Gram(s) (50 mL/Hr) IV Continuous <Continuous>  pancrelipase  (CREON 24,000 Lipase Units) 1 Capsule(s) Oral three times a day with meals  Parenteral Nutrition - Adult 1 Each (83 mL/Hr) TPN Continuous <Continuous>  ursodiol Capsule 300 milliGRAM(s) Oral every 8 hours  venlafaxine XR. 300 milliGRAM(s) Oral every 24 hours    MEDICATIONS  (PRN):  albuterol/ipratropium for Nebulization 3 milliLiter(s) Nebulizer every 6 hours PRN Shortness of Breath and/or Wheezing  dextrose Oral Gel 15 Gram(s) Oral once PRN Blood Glucose LESS THAN 70 milliGRAM(s)/deciliter  LORazepam   Injectable 0.25 milliGRAM(s) IV Push every 24 hours PRN Anxiety  melatonin 5 milliGRAM(s) Oral at bedtime PRN Insomnia  ondansetron Injectable 4 milliGRAM(s) IV Push every 6 hours PRN Nausea and/or Vomiting      ALLERGIES:  Allergies    penicillin (Angioedema)    Intolerances        LABS:                        7.8    11.17 )-----------( 85       ( 05 Jun 2024 05:30 )             23.2     06-05    137  |  100  |  80<H>  ----------------------------<  158<H>  3.9   |  27  |  1.93<H>    Ca    8.4      05 Jun 2024 05:30  Phos  4.3     06-05  Mg     2.1     06-05    TPro  7.0  /  Alb  2.2<L>  /  TBili  11.6<H>  /  DBili  7.9<H>  /  AST  305<H>  /  ALT  41  /  AlkPhos  94  06-05    PT/INR - ( 05 Jun 2024 05:30 )   PT: 19.3 sec;   INR: 1.72          PTT - ( 05 Jun 2024 05:30 )  PTT:41.6 sec  Urinalysis Basic - ( 05 Jun 2024 05:30 )    Color: x / Appearance: x / SG: x / pH: x  Gluc: 158 mg/dL / Ketone: x  / Bili: x / Urobili: x   Blood: x / Protein: x / Nitrite: x   Leuk Esterase: x / RBC: x / WBC x   Sq Epi: x / Non Sq Epi: x / Bacteria: x      CAPILLARY BLOOD GLUCOSE      POCT Blood Glucose.: 175 mg/dL (05 Jun 2024 06:45)      Culture - Fungal, Body Fluid (collected 06-04-24 @ 13:13)  Source: .Body Fluid left pleural fluid  Preliminary Report (06-05-24 @ 06:52):    Testing in progress    Culture - Body Fluid with Gram Stain (collected 06-04-24 @ 13:13)  Source: .Body Fluid left pleural fluid  Gram Stain (06-05-24 @ 00:29):    Rare polymorphonuclear leukocytes seen per low power field    No organisms seen per oil power field    RADIOLOGY & ADDITIONAL TESTS: Reviewed.

## 2024-06-05 NOTE — CONSULT NOTE ADULT - SUBJECTIVE AND OBJECTIVE BOX
[facility] Geriatrics and Palliative Care  Andrea Harper, Palliative Care Attending  Contact Info: Call [phone] (HEAL Line) or message on Microsoft Teams (Andrea Harper)    HPI:  78M with PMHx of AFib/Flutter 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 Prolonged hospital course significant for recurrent AKIs, cholestatic transaminitis s/p perc cony, prior DVTs, PNAs, fungemia, sinus pauses s/p pacemaker and recurrent bleeding from fistula recently s/p IR embolization of L branch of inferior epigastric artery via R groin access (4/16), with mutiple presentations afterwards for recurrent bleeding at fistula site requiring transfusions. Most recently admitted 5/5/24 for BRB from ECF requiring multiple transfusions, was transferred to SICU, where bcx returned positive for MSSA managed with Cefazolin 2g (5/10-6/10), was transferred to [facility] for further surgical care, however found to have elevated portal HTN, now transferred back to [facility] admitted to telemetry.     Patient seen at bedside, found laying comfortably in bed, NAD. He says he feels well overall and does not have any acute complaints. Suction container with ~400cc of blood that was suctioned via nursing earlier. Per private nurse Bernice, pt did not have any bloody output from stoma while at [facility].  (29 May 2024 00:27)    Patient seen and examined at bedside. Appears overtly comfortable. Denies any significant complaint. Comprehensive symptom assessment and GOC exploration as noted below. Further collateral obtained from primary team, chart, and family.    PERTINENT PM/SXH:   Essential hypertension    Atrial fibrillation    Crohn's disease    Hyperlipidemia    Hypothyroidism    ELVI (acute kidney injury)    History of depression    Junctional rhythm    Bradycardia      Other specified personal history presenting hazards to health    H/O knee surgery    History of cataract surgery    S/P appendectomy      FAMILY HISTORY:    No history of  in first degree relatives  Unable to obtain due to encephalopathy    ITEMS NOT CHECKED ARE NOT PRESENT  SOCIAL HISTORY:   Significant other/partner:  []  Children:  []  Substance hx:  []   Tobacco hx:  []   Alcohol hx: []   Home Opioid hx:  [] I-Stop Reference No:  - no active Rx's / see chart note  Living Situation: []Home  []Long term care  []Rehab []Other  Hoahaoism/Spiritual practice: ; Role of organized Yazdanism [] important [] some [] unable to assess  Coping: [] well [] with difficulty [] poor coping [] unable to assess  Support system: [] strong [] adequate [] inadequate    ADVANCE DIRECTIVES:    []MOLST  []Living Will  DECISION MAKER(s):  [] Health Care Proxy(s)  [] Surrogate(s)  [] Guardian           Name(s)/Phone Number(s):     BASELINE (I)ADLs (prior to admission):  [place]: []Total  [] Moderate []Dependent    ALLERGIES:  penicillin (Angioedema)    MEDICATIONS  (STANDING):  allopurinol 300 milliGRAM(s) Oral every 24 hours  atorvastatin 20 milliGRAM(s) Oral at bedtime  carvedilol 6.25 milliGRAM(s) Oral every 12 hours  ceFAZolin   IVPB 2000 milliGRAM(s) IV Intermittent every 12 hours  chlorhexidine 2% Cloths 1 Application(s) Topical <User Schedule>  cholestyramine Powder (Sugar-Free) 4 Gram(s) Oral every 24 hours  dextrose 10% Bolus 125 milliLiter(s) IV Bolus once  dextrose 5%. 1000 milliLiter(s) (100 mL/Hr) IV Continuous <Continuous>  dextrose 5%. 1000 milliLiter(s) (50 mL/Hr) IV Continuous <Continuous>  dextrose 50% Injectable 25 Gram(s) IV Push once  dextrose 50% Injectable 12.5 Gram(s) IV Push once  epoetin matt (EPOGEN) Injectable 82707 Unit(s) IV Push every 7 days  ergocalciferol 65822 Unit(s) Oral <User Schedule>  glucagon  Injectable 1 milliGRAM(s) IntraMuscular once  hydrocortisone Enema 100 milliGRAM(s) Rectal every 12 hours  insulin lispro (ADMELOG) corrective regimen sliding scale   SubCutaneous three times a day before meals  insulin lispro (ADMELOG) corrective regimen sliding scale   SubCutaneous at bedtime  levothyroxine 50 MICROGram(s) Oral every 24 hours  Omegaven 10grams/100 mL 60 Gram(s) 60 Gram(s) (50 mL/Hr) IV Continuous <Continuous>  ondansetron Injectable 4 milliGRAM(s) IV Push every 6 hours  pancrelipase  (CREON 24,000 Lipase Units) 1 Capsule(s) Oral three times a day with meals  Parenteral Nutrition - Adult 1 Each (83 mL/Hr) TPN Continuous <Continuous>  Parenteral Nutrition - Adult 1 Each (83 mL/Hr) TPN Continuous <Continuous>  ursodiol Capsule 300 milliGRAM(s) Oral every 8 hours  venlafaxine XR. 300 milliGRAM(s) Oral every 24 hours    MEDICATIONS  (PRN):  albuterol/ipratropium for Nebulization 3 milliLiter(s) Nebulizer every 6 hours PRN Shortness of Breath and/or Wheezing  dextrose Oral Gel 15 Gram(s) Oral once PRN Blood Glucose LESS THAN 70 milliGRAM(s)/deciliter  LORazepam   Injectable 0.25 milliGRAM(s) IV Push every 24 hours PRN Anxiety  melatonin 5 milliGRAM(s) Oral at bedtime PRN Insomnia    Analgesic Use (Scheduled and PRNs) for past 24 hours:    HYDROmorphone  Injectable   0.2 milliGRAM(s) IV Push (06-04-24 @ 12:43)    LORazepam   Injectable   0.25 milliGRAM(s) IV Push (06-04-24 @ 22:23)    melatonin   5 milliGRAM(s) Oral (06-04-24 @ 22:23)    ondansetron Injectable   4 milliGRAM(s) IV Push (06-05-24 @ 11:22)   4 milliGRAM(s) IV Push (06-05-24 @ 05:32)    venlafaxine XR.   300 milliGRAM(s) Oral (06-05-24 @ 06:56)      PRESENT SYMPTOMS: []Unable to obtain due to poor mentation/encephalopathy  Source if other than patient:  []Family   []Team     Pain: [] yes [] no  QOL impact -   Location -                    Aggravating Factors -  Quality -  Radiation -  Timing -  Severity (0-10 scale) -   Minimal Acceptable Level (0-10 scale) -    CPOT Score:  (Critical Care Pain Assessment)    PAIN AD Score:  (Nonverbal Pain Assessment)    Dyspnea:                           []Mild  []Moderate []Severe  Anxiety:                             []Mild []Moderate []Severe  Fatigue:                             []Mild []Moderate []Severe  Nausea:                             []Mild []Moderate []Severe  Loss of Appetite:              []Mild []Moderate []Severe  Constipation:                    []Mild []Moderate []Severe    Other Symptoms:  [x]All other review of systems negative     Palliative Performance Status Version 2:  %  (Functional Assessment Tool)    GENERAL:  [] NAD []Alert []Lethargic  []Cachexia  []Unarousable  []Verbal  []Non-Verbal  BEHAVIORAL:   []Anxiety  []Delirium []Agitation []Cooperative []Oriented x  HEENT:  []Normal  [] Moist Mucous Membranes []Dry mouth   []ET Tube/Trach  []Oral lesions  PULMONARY:   []Clear []Tachypnea  []Audible excessive secretions  []Normal Work of Breathing []Labored Breathing  []Rhonchi []Crackles []Wheezing  CARDIOVASCULAR:    []Regular Rate []Regular Rhythm []Irregular []Tachy  []Arie  GASTROINTESTINAL:  []Soft  []Distended   []+BS  []Non tender []Tender  []PEG []OGT/ NGT  Last BM:  GENITOURINARY:  []Normal [] Incontinent   []Oliguria/Anuria   []Poole  MUSCULOSKELETAL:   []Normal Extremities  []Weakness  []Bed/Wheelchair bound []Edema  NEUROLOGIC:   []No focal deficits  []Cognitive impairment  []Dysphagia []Dysarthria []Paresis []Encephalopathic  SKIN:   []Normal   []Pressure ulcer(s)  []Rash    CRITICAL CARE:  [ ]Shock Present  [ ]Septic [ ]Cardiogenic [ ]Neurologic [ ]Hypovolemic  [ ] Vasopressors [ ]Inotropes   [ ]Respiratory failure present [ ]Mechanical Ventilation [ ]Non-invasive ventilatory support [ ]High-Flow  [ ]Acute  [ ]Chronic [ ]Hypoxic  [ ]Hypercarbic   [ ]Other organ failure    Vital Signs Last 24 Hrs  T(C): 36.7 (05 Jun 2024 10:00), Max: 37 (05 Jun 2024 05:11)  T(F): 98 (05 Jun 2024 10:00), Max: 98.6 (05 Jun 2024 05:11)  HR: 76 (05 Jun 2024 11:00) (67 - 77)  BP: 102/48 (05 Jun 2024 11:00) (95/46 - 134/55)  BP(mean): 69 (05 Jun 2024 11:00) (66 - 84)  RR: 24 (05 Jun 2024 11:00) (15 - 24)  SpO2: 96% (05 Jun 2024 11:00) (92% - 100%)    Parameters below as of 05 Jun 2024 12:00  Patient On (Oxygen Delivery Method): nasal cannula w/ humidification  O2 Flow (L/min): 4       LABS: Personally reviewed and interpreted                        7.8    11.17 )-----------( 85       ( 05 Jun 2024 05:30 )             23.2   06-05    137  |  100  |  80<H>  ----------------------------<  158<H>  3.9   |  27  |  1.93<H>    Ca    8.4      05 Jun 2024 05:30  Phos  4.3     06-05  Mg     2.1     06-05    TPro  7.0  /  Alb  2.2<L>  /  TBili  11.6<H>  /  DBili  7.9<H>  /  AST  305<H>  /  ALT  41  /  AlkPhos  94  06-05    RADIOLOGY & ADDITIONAL STUDIES: Personally reviewed and interpreted    REFERRALS:  [x]Social Work/Case management []PT/OT []Chaplaincy  []Hospice  []Patient/Family Support []Massage Therapy []Music Therapy []Holistic RN []Ethics    DISCUSSION OF CASE: Family - to provide updates and emotional support; Primary Team/RN - to discuss plan of care [facility] Geriatrics and Palliative Care  Andrea Harper, Palliative Care Attending  Contact Info: Call [phone] (HEAL Line) or message on Microsoft Teams (Andrea Harper)    HPI:  78M with PMHx of AFib/Flutter 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 Prolonged hospital course significant for recurrent AKIs, cholestatic transaminitis s/p perc cony, prior DVTs, PNAs, fungemia, sinus pauses s/p pacemaker and recurrent bleeding from fistula recently s/p IR embolization of L branch of inferior epigastric artery via R groin access (4/16), with mutiple presentations afterwards for recurrent bleeding at fistula site requiring transfusions. Most recently admitted 5/5/24 for BRB from ECF requiring multiple transfusions, was transferred to SICU, where bcx returned positive for MSSA managed with Cefazolin 2g (5/10-6/10), was transferred to [place] [facility] for further surgical care, however found to have elevated portal HTN, now transferred back to [facility] admitted to telemetry.     Patient seen at bedside, found laying comfortably in bed, NAD. He says he feels well overall and does not have any acute complaints. Suction container with ~400cc of blood that was suctioned via nursing earlier. Per private nurse Bernice, pt did not have any bloody output from stoma while at [facility].  (29 May 2024 00:27)    Patient seen and examined at bedside. Appears overtly comfortable. Endorses new nausea and intermittent SOB. Asking for more liberal use of BIPAP. Comprehensive symptom assessment and GOC exploration as noted below. Further collateral obtained from primary team, chart, and family.    PERTINENT PM/SXH:   Essential hypertension    Atrial fibrillation    Crohn's disease    Hyperlipidemia    Hypothyroidism    ELVI (acute kidney injury)    History of depression    Junctional rhythm    Bradycardia      Other specified personal history presenting hazards to health    H/O knee surgery    History of cataract surgery    S/P appendectomy      FAMILY HISTORY:  No history of malignancy in first degree relatives    ITEMS NOT CHECKED ARE NOT PRESENT  SOCIAL HISTORY:   Significant other/partner:  [x]  Children:  [x]  Substance hx:  []   Tobacco hx:  []   Alcohol hx: []   Home Opioid hx:  [] I-Stop Reference No:  - no active Rx's  Living Situation: [x]Home  []Long term care  []Rehab []Other  Yarsani/Spiritual practice: ; Role of organized Alevism [] important [] some [] unable to assess  Coping: [x] well [] with difficulty [] poor coping [] unable to assess  Support system: [x] strong [] adequate [] inadequate    ADVANCE DIRECTIVES:    []MOLST  []Living Will  DECISION MAKER(s):  [x] Health Care Proxy(s)  [] Surrogate(s)  [] Guardian           Name(s)/Phone Number(s): Wife    BASELINE (I)ADLs (prior to admission):  [place]: []Total  [] Moderate [x]Dependent    ALLERGIES:  penicillin (Angioedema)    MEDICATIONS  (STANDING):  allopurinol 300 milliGRAM(s) Oral every 24 hours  atorvastatin 20 milliGRAM(s) Oral at bedtime  carvedilol 6.25 milliGRAM(s) Oral every 12 hours  ceFAZolin   IVPB 2000 milliGRAM(s) IV Intermittent every 12 hours  chlorhexidine 2% Cloths 1 Application(s) Topical <User Schedule>  cholestyramine Powder (Sugar-Free) 4 Gram(s) Oral every 24 hours  dextrose 10% Bolus 125 milliLiter(s) IV Bolus once  dextrose 5%. 1000 milliLiter(s) (100 mL/Hr) IV Continuous <Continuous>  dextrose 5%. 1000 milliLiter(s) (50 mL/Hr) IV Continuous <Continuous>  dextrose 50% Injectable 25 Gram(s) IV Push once  dextrose 50% Injectable 12.5 Gram(s) IV Push once  epoetin matt (EPOGEN) Injectable 09899 Unit(s) IV Push every 7 days  ergocalciferol 26457 Unit(s) Oral <User Schedule>  glucagon  Injectable 1 milliGRAM(s) IntraMuscular once  hydrocortisone Enema 100 milliGRAM(s) Rectal every 12 hours  insulin lispro (ADMELOG) corrective regimen sliding scale   SubCutaneous three times a day before meals  insulin lispro (ADMELOG) corrective regimen sliding scale   SubCutaneous at bedtime  levothyroxine 50 MICROGram(s) Oral every 24 hours  Omegaven 10grams/100 mL 60 Gram(s) 60 Gram(s) (50 mL/Hr) IV Continuous <Continuous>  ondansetron Injectable 4 milliGRAM(s) IV Push every 6 hours  pancrelipase  (CREON 24,000 Lipase Units) 1 Capsule(s) Oral three times a day with meals  Parenteral Nutrition - Adult 1 Each (83 mL/Hr) TPN Continuous <Continuous>  Parenteral Nutrition - Adult 1 Each (83 mL/Hr) TPN Continuous <Continuous>  ursodiol Capsule 300 milliGRAM(s) Oral every 8 hours  venlafaxine XR. 300 milliGRAM(s) Oral every 24 hours    MEDICATIONS  (PRN):  albuterol/ipratropium for Nebulization 3 milliLiter(s) Nebulizer every 6 hours PRN Shortness of Breath and/or Wheezing  dextrose Oral Gel 15 Gram(s) Oral once PRN Blood Glucose LESS THAN 70 milliGRAM(s)/deciliter  LORazepam   Injectable 0.25 milliGRAM(s) IV Push every 24 hours PRN Anxiety  melatonin 5 milliGRAM(s) Oral at bedtime PRN Insomnia    Analgesic Use (Scheduled and PRNs) for past 24 hours:    HYDROmorphone  Injectable   0.2 milliGRAM(s) IV Push (06-04-24 @ 12:43)    LORazepam   Injectable   0.25 milliGRAM(s) IV Push (06-04-24 @ 22:23)    melatonin   5 milliGRAM(s) Oral (06-04-24 @ 22:23)    ondansetron Injectable   4 milliGRAM(s) IV Push (06-05-24 @ 11:22)   4 milliGRAM(s) IV Push (06-05-24 @ 05:32)    venlafaxine XR.   300 milliGRAM(s) Oral (06-05-24 @ 06:56)      PRESENT SYMPTOMS: []Unable to obtain due to poor mentation/encephalopathy  Source if other than patient:  []Family   []Team     Pain: [] yes [x] no  QOL impact -   Location -                    Aggravating Factors -  Quality -  Radiation -  Timing -  Severity (0-10 scale) -   Minimal Acceptable Level (0-10 scale) -    Dyspnea:                           [x]Mild  []Moderate []Severe  Anxiety:                             []Mild []Moderate []Severe  Fatigue:                             []Mild []Moderate []Severe  Nausea:                             []Mild [x]Moderate []Severe  Loss of Appetite:              []Mild []Moderate []Severe  Constipation:                    []Mild []Moderate []Severe    Other Symptoms:  [x]All other review of systems negative     Palliative Performance Status Version 2:  30%  (Functional Assessment Tool)    GENERAL:  [x] NAD [x]Alert []Lethargic  []Cachexia  []Unarousable  [x] Verbal  []Non-Verbal  BEHAVIORAL:   []Anxiety  [x]Delirium []Agitation []Cooperative []Oriented x  HEENT:  [x]Normal  [x] Moist Mucous Membranes []Dry mouth   []ET Tube/Trach  []Oral lesions  PULMONARY:   []Clear []Tachypnea  []Audible excessive secretions  []Normal Work of Breathing [x]Labored Breathing  [x]Rhonchi []Crackles []Wheezing  CARDIOVASCULAR:    [x]Regular Rate [x]Regular Rhythm []Irregular []Tachy  []Arie  GASTROINTESTINAL:  [x]Soft  [x]Distended   [x]+BS  []Non tender [x]Tender  []PEG []OGT/ NGT  Last BM: +ileostomy  GENITOURINARY:  []Normal [] Incontinent   [x]Oliguria/Anuria   []Poole  MUSCULOSKELETAL:   []Normal Extremities  [x]Weakness  [x]Bed/Wheelchair bound []Edema  NEUROLOGIC:   []No focal deficits  []Cognitive impairment  []Dysphagia []Dysarthria []Paresis [x]Encephalopathic  SKIN:   []Normal   []Pressure ulcer(s)  [x]Abdominal Wound    Vital Signs Last 24 Hrs  T(C): 36.7 (05 Jun 2024 10:00), Max: 37 (05 Jun 2024 05:11)  T(F): 98 (05 Jun 2024 10:00), Max: 98.6 (05 Jun 2024 05:11)  HR: 76 (05 Jun 2024 11:00) (67 - 77)  BP: 102/48 (05 Jun 2024 11:00) (95/46 - 134/55)  BP(mean): 69 (05 Jun 2024 11:00) (66 - 84)  RR: 24 (05 Jun 2024 11:00) (15 - 24)  SpO2: 96% (05 Jun 2024 11:00) (92% - 100%)    Parameters below as of 05 Jun 2024 12:00  Patient On (Oxygen Delivery Method): nasal cannula w/ humidification  O2 Flow (L/min): 4     LABS: Personally reviewed and interpreted                      7.8    11.17 )-----------( 85       ( 05 Jun 2024 05:30 )             23.2   06-05    137  |  100  |  80<H>  ----------------------------<  158<H>  3.9   |  27  |  1.93<H>    Ca    8.4      05 Jun 2024 05:30  Phos  4.3     06-05  Mg     2.1     06-05  TPro  7.0  /  Alb  2.2<L>  /  TBili  11.6<H>  /  DBili  7.9<H>  /  AST  305<H>  /  ALT  41  /  AlkPhos  94  06-05    RADIOLOGY & ADDITIONAL STUDIES: Personally reviewed and interpreted  < from: CT Angio Abdomen and Pelvis w/ IV Cont (06.03.24 @ 15:23) >  LOWER CHEST: Moderate bilateral pleural effusions and bibasilar   atelectasis. Lead less pacemaker system. Loop recorder.    LIVER: Scattered hepatic cysts. No portal hypertension or varices.  BILE DUCTS: Nondilated.  GALLBLADDER: Normal.  SPLEEN: Normal.  PANCREAS: Mild diffuse duct dilatation.  ADRENALS: Normal.  KIDNEYS/URETERS: No calculi, hydronephrosis, or soft tissue attenuating   mass.    BLADDER: Diffusely thickened.  REPRODUCTIVE ORGANS: Enlarged prostate.    BOWEL: Status post right hemicolectomy and ileostomy. No bowel   obstruction. No active GI bleeding.  PERITONEUM: No free air or ascites.  VESSELS: Aortoiliac atherosclerosis without aneurysm.  RETROPERITONEUM/LYMPH NODES: No adenopathy.  ABDOMINAL WALL: Open surgical wound.  BONES: No acute bony abnormality.    IMPRESSION:  *  No bowel obstruction or active GI bleeding.  *  No definite evidence of portal hypertension or varices.    REFERRALS:  [x]Social Work/Case management [x]PT/OT []Chaplaincy  []Hospice  [x]Patient/Family Support [x]Massage Therapy []Music Therapy []Holistic RN []Ethics    DISCUSSION OF CASE: Family - to provide updates and emotional support; Primary Team/RN - to discuss plan of care [facility] Geriatrics and Palliative Care  Andrea Harper, Palliative Care Attending  Contact Info: Call [phone] (HEAL Line) or message on Microsoft Teams (Andrea Harper)    HPI:  78M with PMHx of AFib/Flutter 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 Prolonged hospital course significant for recurrent AKIs, cholestatic transaminitis s/p perc cony, prior DVTs, PNAs, fungemia, sinus pauses s/p pacemaker and recurrent bleeding from fistula recently s/p IR embolization of L branch of inferior epigastric artery via R groin access (4/16), with mutiple presentations afterwards for recurrent bleeding at fistula site requiring transfusions. Most recently admitted 5/5/24 for BRB from ECF requiring multiple transfusions, was transferred to SICU, where bcx returned positive for MSSA managed with Cefazolin 2g (5/10-6/10), was transferred to [place] [facility] for further surgical care, however found to have elevated portal HTN, now transferred back to [facility] admitted to telemetry.     Patient seen at bedside, found laying comfortably in bed, NAD. He says he feels well overall and does not have any acute complaints. Suction container with ~400cc of blood that was suctioned via nursing earlier. Per private nurse Bernice, pt did not have any bloody output from stoma while at [facility].  (29 May 2024 00:27)    Patient seen and examined at bedside. Appears overtly comfortable. Endorses new nausea and intermittent SOB. Asking for more liberal use of BIPAP. Comprehensive symptom assessment and GOC exploration as noted below. Further collateral obtained from primary team, chart, and family.    PERTINENT PM/SXH:   Essential hypertension  Atrial fibrillation  Crohn's disease  Hyperlipidemia  Hypothyroidism  ELVI (acute kidney injury)  History of depression  Junctional rhythm  Bradycardia  Other specified personal history presenting hazards to health  H/O knee surgery  History of cataract surgery  S/P appendectomy    FAMILY HISTORY:  No history of malignancy in first degree relatives    ITEMS NOT CHECKED ARE NOT PRESENT  SOCIAL HISTORY:   Significant other/partner:  [x]  Children:  [x]  Substance hx:  []   Tobacco hx:  []   Alcohol hx: []   Home Opioid hx:  [] I-Stop Reference No:  - no active Rx's  Living Situation: [x]Home  []Long term care  []Rehab []Other  Religious/Spiritual practice: ; Role of organized Voodoo [] important [] some [] unable to assess  Coping: [x] well [] with difficulty [] poor coping [] unable to assess  Support system: [x] strong [] adequate [] inadequate    ADVANCE DIRECTIVES:    []MOLST  []Living Will  DECISION MAKER(s):  [x] Health Care Proxy(s)  [] Surrogate(s)  [] Guardian           Name(s)/Phone Number(s): Wife    BASELINE (I)ADLs (prior to admission):  [place]: []Total  [] Moderate [x]Dependent    ALLERGIES:  penicillin (Angioedema)    MEDICATIONS  (STANDING):  allopurinol 300 milliGRAM(s) Oral every 24 hours  atorvastatin 20 milliGRAM(s) Oral at bedtime  carvedilol 6.25 milliGRAM(s) Oral every 12 hours  ceFAZolin   IVPB 2000 milliGRAM(s) IV Intermittent every 12 hours  chlorhexidine 2% Cloths 1 Application(s) Topical <User Schedule>  cholestyramine Powder (Sugar-Free) 4 Gram(s) Oral every 24 hours  dextrose 10% Bolus 125 milliLiter(s) IV Bolus once  dextrose 5%. 1000 milliLiter(s) (100 mL/Hr) IV Continuous <Continuous>  dextrose 5%. 1000 milliLiter(s) (50 mL/Hr) IV Continuous <Continuous>  dextrose 50% Injectable 25 Gram(s) IV Push once  dextrose 50% Injectable 12.5 Gram(s) IV Push once  epoetin matt (EPOGEN) Injectable 09844 Unit(s) IV Push every 7 days  ergocalciferol 68226 Unit(s) Oral <User Schedule>  glucagon  Injectable 1 milliGRAM(s) IntraMuscular once  hydrocortisone Enema 100 milliGRAM(s) Rectal every 12 hours  insulin lispro (ADMELOG) corrective regimen sliding scale   SubCutaneous three times a day before meals  insulin lispro (ADMELOG) corrective regimen sliding scale   SubCutaneous at bedtime  levothyroxine 50 MICROGram(s) Oral every 24 hours  Omegaven 10grams/100 mL 60 Gram(s) 60 Gram(s) (50 mL/Hr) IV Continuous <Continuous>  ondansetron Injectable 4 milliGRAM(s) IV Push every 6 hours  pancrelipase  (CREON 24,000 Lipase Units) 1 Capsule(s) Oral three times a day with meals  Parenteral Nutrition - Adult 1 Each (83 mL/Hr) TPN Continuous <Continuous>  Parenteral Nutrition - Adult 1 Each (83 mL/Hr) TPN Continuous <Continuous>  ursodiol Capsule 300 milliGRAM(s) Oral every 8 hours  venlafaxine XR. 300 milliGRAM(s) Oral every 24 hours    MEDICATIONS  (PRN):  albuterol/ipratropium for Nebulization 3 milliLiter(s) Nebulizer every 6 hours PRN Shortness of Breath and/or Wheezing  dextrose Oral Gel 15 Gram(s) Oral once PRN Blood Glucose LESS THAN 70 milliGRAM(s)/deciliter  LORazepam   Injectable 0.25 milliGRAM(s) IV Push every 24 hours PRN Anxiety  melatonin 5 milliGRAM(s) Oral at bedtime PRN Insomnia    Analgesic Use (Scheduled and PRNs) for past 24 hours:  HYDROmorphone  Injectable   0.2 milliGRAM(s) IV Push (06-04-24 @ 12:43)  LORazepam   Injectable   0.25 milliGRAM(s) IV Push (06-04-24 @ 22:23)  melatonin   5 milliGRAM(s) Oral (06-04-24 @ 22:23)  ondansetron Injectable   4 milliGRAM(s) IV Push (06-05-24 @ 11:22)   4 milliGRAM(s) IV Push (06-05-24 @ 05:32)  venlafaxine XR.   300 milliGRAM(s) Oral (06-05-24 @ 06:56)    PRESENT SYMPTOMS: []Unable to obtain due to poor mentation/encephalopathy  Source if other than patient:  []Family   []Team     Pain: [] yes [x] no  QOL impact -   Location -                    Aggravating Factors -  Quality -  Radiation -  Timing -  Severity (0-10 scale) -   Minimal Acceptable Level (0-10 scale) -    Dyspnea:                           [x]Mild  []Moderate []Severe  Anxiety:                             []Mild []Moderate []Severe  Fatigue:                             []Mild []Moderate []Severe  Nausea:                             []Mild [x]Moderate []Severe  Loss of Appetite:              []Mild []Moderate []Severe  Constipation:                    []Mild []Moderate []Severe    Other Symptoms:  [x]All other review of systems negative     Palliative Performance Status Version 2:  30%  (Functional Assessment Tool)    GENERAL:  [x] NAD [x]Alert []Lethargic  []Cachexia  []Unarousable  [x] Verbal  []Non-Verbal  BEHAVIORAL:   []Anxiety  [x]Delirium []Agitation []Cooperative []Oriented x  HEENT:  [x]Normal  [x] Moist Mucous Membranes []Dry mouth   []ET Tube/Trach  []Oral lesions  PULMONARY:   []Clear []Tachypnea  []Audible excessive secretions  []Normal Work of Breathing [x]Labored Breathing  [x]Rhonchi []Crackles []Wheezing  CARDIOVASCULAR:    [x]Regular Rate [x]Regular Rhythm []Irregular []Tachy  []Arie  GASTROINTESTINAL:  [x]Soft  [x]Distended   [x]+BS  []Non tender [x]Tender  []PEG []OGT/ NGT  Last BM: +ileostomy  GENITOURINARY:  []Normal [] Incontinent   [x]Oliguria/Anuria   []Poole  MUSCULOSKELETAL:   []Normal Extremities  [x]Weakness  [x]Bed/Wheelchair bound []Edema  NEUROLOGIC:   []No focal deficits  []Cognitive impairment  []Dysphagia []Dysarthria []Paresis [x]Encephalopathic  SKIN:   []Normal   []Pressure ulcer(s)  [x]Abdominal Wound    Vital Signs Last 24 Hrs  T(C): 36.7 (05 Jun 2024 10:00), Max: 37 (05 Jun 2024 05:11)  T(F): 98 (05 Jun 2024 10:00), Max: 98.6 (05 Jun 2024 05:11)  HR: 76 (05 Jun 2024 11:00) (67 - 77)  BP: 102/48 (05 Jun 2024 11:00) (95/46 - 134/55)  BP(mean): 69 (05 Jun 2024 11:00) (66 - 84)  RR: 24 (05 Jun 2024 11:00) (15 - 24)  SpO2: 96% (05 Jun 2024 11:00) (92% - 100%)    Parameters below as of 05 Jun 2024 12:00  Patient On (Oxygen Delivery Method): nasal cannula w/ humidification  O2 Flow (L/min): 4     LABS: Personally reviewed and interpreted                      7.8    11.17 )-----------( 85       ( 05 Jun 2024 05:30 )             23.2   06-05    137  |  100  |  80<H>  ----------------------------<  158<H>  3.9   |  27  |  1.93<H>    Ca    8.4      05 Jun 2024 05:30  Phos  4.3     06-05  Mg     2.1     06-05  TPro  7.0  /  Alb  2.2<L>  /  TBili  11.6<H>  /  DBili  7.9<H>  /  AST  305<H>  /  ALT  41  /  AlkPhos  94  06-05    RADIOLOGY & ADDITIONAL STUDIES: Personally reviewed and interpreted  < from: CT Angio Abdomen and Pelvis w/ IV Cont (06.03.24 @ 15:23) >  LOWER CHEST: Moderate bilateral pleural effusions and bibasilar   atelectasis. Lead less pacemaker system. Loop recorder.    LIVER: Scattered hepatic cysts. No portal hypertension or varices.  BILE DUCTS: Nondilated.  GALLBLADDER: Normal.  SPLEEN: Normal.  PANCREAS: Mild diffuse duct dilatation.  ADRENALS: Normal.  KIDNEYS/URETERS: No calculi, hydronephrosis, or soft tissue attenuating   mass.    BLADDER: Diffusely thickened.  REPRODUCTIVE ORGANS: Enlarged prostate.    BOWEL: Status post right hemicolectomy and ileostomy. No bowel   obstruction. No active GI bleeding.  PERITONEUM: No free air or ascites.  VESSELS: Aortoiliac atherosclerosis without aneurysm.  RETROPERITONEUM/LYMPH NODES: No adenopathy.  ABDOMINAL WALL: Open surgical wound.  BONES: No acute bony abnormality.    IMPRESSION:  *  No bowel obstruction or active GI bleeding.  *  No definite evidence of portal hypertension or varices.    REFERRALS:  [x]Social Work/Case management [x]PT/OT []Chaplaincy  []Hospice  [x]Patient/Family Support [x]Massage Therapy []Music Therapy []Holistic RN []Ethics    DISCUSSION OF CASE: Family - to provide updates and emotional support; Primary Team/RN - to discuss plan of care

## 2024-06-05 NOTE — CONSULT NOTE ADULT - ASSESSMENT
·	introduced the role of Palliative Medicine for symptom management, advance care planning, and emotional support  -patient appreciative of the visit and accepting of "as much support as [he] can get"  ·	ordered scheduled Zofran 4mg IV q6h ATC, will continue for 1-2 days  ·	additional psychosocial referrals for coping: Massage Therapy, Music Therapy, Patient/Family Counsellor  ·	will continue to follow for rapport building, support, and exploration of GOC 77yo M with PMH of Crohn's, AFib, CKD, Enterocutaneous Fistula, and multiple long complicated hospital courses p/w bleeding and non-healing wound. Palliative consulted for complex medical decision making in the setting of critical illness.    ·	introduced the role of Palliative Medicine for symptom management, advance care planning, and emotional support  -patient appreciative of the visit and accepting of "as much support as [he] can get"  ·	ordered scheduled Zofran 4mg IV q6h ATC, will continue for 1-2 days  ·	additional psychosocial referrals for coping: Massage Therapy, Music Therapy, Patient/Family Counsellor  ·	will continue to follow for rapport building, support, and exploration of GOC

## 2024-06-05 NOTE — CONSULT NOTE ADULT - TIME BILLING
Emotional Support/Supportive Care and Clarification of Potential Disease Trajectory related to Critical Illness  Assessment of Symptom [place] and Palliative regimen  Exploration of GOC/Advanced Directives including HCP designation, code status, and hospice eligibility    Time inclusive of chart review, medication ordering, discussion with primary team, clinical documentation, and communication with family/caregiver

## 2024-06-05 NOTE — PROGRESS NOTE ADULT - SUBJECTIVE AND OBJECTIVE BOX
PULMONARY CONSULT SERVICE FOLLOW-UP NOTE    INTERVAL HPI:  Reviewed chart and overnight events; patient seen and examined at bedside.  Asleep, resting comfortable. Per primary team, felt some improvement with thoracentesis.     MEDICATIONS:  Pulmonary:  albuterol/ipratropium for Nebulization 3 milliLiter(s) Nebulizer every 6 hours PRN    Antimicrobials:  ceFAZolin   IVPB 2000 milliGRAM(s) IV Intermittent every 12 hours    Anticoagulants:    Cardiac:  carvedilol 6.25 milliGRAM(s) Oral every 12 hours      Allergies    penicillin (Angioedema)    Intolerances        Vital Signs Last 24 Hrs  T(C): 36.7 (2024 10:00), Max: 37 (2024 05:11)  T(F): 98 (2024 10:00), Max: 98.6 (2024 05:11)  HR: 76 (2024 11:00) (67 - 77)  BP: 102/48 (2024 11:00) (95/46 - 134/55)  BP(mean): 69 (:00) (66 - 84)  RR: 24 (2024 11:00) (15 - 24)  SpO2: 96% (2024 11:00) (92% - 100%)    Parameters below as of 2024 12:00  Patient On (Oxygen Delivery Method): nasal cannula w/ humidification  O2 Flow (L/min): 4       @ 07:  -   @ 07:00  --------------------------------------------------------  IN: 3172 mL / OUT: 5805 mL / NET: -2633 mL     @ 07:01  -  05 @ 11:34  --------------------------------------------------------  IN: 382 mL / OUT: 850 mL / NET: -468 mL          PHYSICAL EXAM:  Constitutional: NAD  HEENT: NC/AT; PERRL, anicteric sclera; MMM  Neck: supple  Cardiovascular: +S1/S2, RRR  Respiratory: Crackles at bases, R>L  Gastrointestinal: soft, NT/ND  Extremities: WWP; no edema, clubbing or cyanosis  Vascular: 2+ radial pulses B/L  Neurological: asleep, resting comfortably     LABS:      CBC Full  -  ( 2024 05:30 )  WBC Count : 11.17 K/uL  RBC Count : 2.61 M/uL  Hemoglobin : 7.8 g/dL  Hematocrit : 23.2 %  Platelet Count - Automated : 85 K/uL  Mean Cell Volume : 88.9 fl  Mean Cell Hemoglobin : 29.9 pg  Mean Cell Hemoglobin Concentration : 33.6 gm/dL  Auto Neutrophil # : x  Auto Lymphocyte # : x  Auto Monocyte # : x  Auto Eosinophil # : x  Auto Basophil # : x  Auto Neutrophil % : x  Auto Lymphocyte % : x  Auto Monocyte % : x  Auto Eosinophil % : x  Auto Basophil % : x    06-05    137  |  100  |  80<H>  ----------------------------<  158<H>  3.9   |  27  |  1.93<H>    Ca    8.4      2024 05:30  Phos  4.3     06-05  Mg     2.1     06-05    TPro  7.0  /  Alb  2.2<L>  /  TBili  11.6<H>  /  DBili  7.9<H>  /  AST  305<H>  /  ALT  41  /  AlkPhos  94  06-05    PT/INR - ( 2024 05:30 )   PT: 19.3 sec;   INR: 1.72          PTT - ( 2024 05:30 )  PTT:41.6 sec      Urinalysis Basic - ( 2024 08:56 )    Color: Dark Yellow / Appearance: Cloudy / S.020 / pH: x  Gluc: x / Ketone: Negative mg/dL  / Bili: Moderate / Urobili: 0.2 mg/dL   Blood: x / Protein: 30 mg/dL / Nitrite: Negative   Leuk Esterase: Negative / RBC: 2 /HPF / WBC 0 /HPF   Sq Epi: x / Non Sq Epi: 4 /HPF / Bacteria: Negative /HPF                RADIOLOGY & ADDITIONAL STUDIES:

## 2024-06-05 NOTE — CONSULT NOTE ADULT - PROBLEM SELECTOR RECOMMENDATION 4
.  Patient is Full Code  -wife is designated healthcare agent  -introduced the role of Palliative Medicine for symptom management, advance care planning, and emotional support  -will continue to follow for rapport building, support, and exploration of GOC

## 2024-06-05 NOTE — PROGRESS NOTE ADULT - SUBJECTIVE AND OBJECTIVE BOX
INTERVAL HPI/OVERNIGHT EVENTS:  ON: CBC hg 7.3. Bled at 11 o'clock position; about 1 L blood mixed with secretions in cannister - controlled with pressure, surgicel and fibrillar. 1u pRBC given. Metoprolol held for SBP < 100.     SUBJECTIVE:  Patient seen and examined by chief resident and team on AM rounds. Patient reports feeling well this morning. AM Hgb 7.8 (7.3) after 1 unit prbc overnight. Cr 1.93 (1.67). Patient reports improvement in breathing since yesterday.     MEDICATIONS  (STANDING):  allopurinol 300 milliGRAM(s) Oral every 24 hours  atorvastatin 20 milliGRAM(s) Oral at bedtime  carvedilol 6.25 milliGRAM(s) Oral every 12 hours  ceFAZolin   IVPB 2000 milliGRAM(s) IV Intermittent every 12 hours  chlorhexidine 2% Cloths 1 Application(s) Topical <User Schedule>  cholestyramine Powder (Sugar-Free) 4 Gram(s) Oral every 24 hours  dextrose 10% Bolus 125 milliLiter(s) IV Bolus once  dextrose 5%. 1000 milliLiter(s) (100 mL/Hr) IV Continuous <Continuous>  dextrose 5%. 1000 milliLiter(s) (50 mL/Hr) IV Continuous <Continuous>  dextrose 50% Injectable 25 Gram(s) IV Push once  dextrose 50% Injectable 12.5 Gram(s) IV Push once  epoetin matt (EPOGEN) Injectable 73507 Unit(s) IV Push every 7 days  ergocalciferol 74922 Unit(s) Oral <User Schedule>  glucagon  Injectable 1 milliGRAM(s) IntraMuscular once  hydrocortisone Enema 100 milliGRAM(s) Rectal every 12 hours  insulin lispro (ADMELOG) corrective regimen sliding scale   SubCutaneous at bedtime  insulin lispro (ADMELOG) corrective regimen sliding scale   SubCutaneous three times a day before meals  levothyroxine 50 MICROGram(s) Oral every 24 hours  Omegaven 10grams/100 mL 60 Gram(s) 60 Gram(s) (50 mL/Hr) IV Continuous <Continuous>  pancrelipase  (CREON 24,000 Lipase Units) 1 Capsule(s) Oral three times a day with meals  Parenteral Nutrition - Adult 1 Each (83 mL/Hr) TPN Continuous <Continuous>  ursodiol Capsule 300 milliGRAM(s) Oral every 8 hours  venlafaxine XR. 300 milliGRAM(s) Oral every 24 hours    MEDICATIONS  (PRN):  albuterol/ipratropium for Nebulization 3 milliLiter(s) Nebulizer every 6 hours PRN Shortness of Breath and/or Wheezing  dextrose Oral Gel 15 Gram(s) Oral once PRN Blood Glucose LESS THAN 70 milliGRAM(s)/deciliter  LORazepam   Injectable 0.25 milliGRAM(s) IV Push every 24 hours PRN Anxiety  melatonin 5 milliGRAM(s) Oral at bedtime PRN Insomnia  ondansetron Injectable 4 milliGRAM(s) IV Push every 6 hours PRN Nausea and/or Vomiting      Vital Signs Last 24 Hrs  T(C): 36.7 (2024 10:00), Max: 37 (2024 05:11)  T(F): 98 (2024 10:00), Max: 98.6 (2024 05:11)  HR: 70 (2024 10:00) (67 - 77)  BP: 112/56 (2024 10:00) (95/46 - 134/55)  BP(mean): 80 (2024 10:00) (66 - 85)  RR: 18 (2024 10:00) (15 - 23)  SpO2: 100% (2024 10:00) (92% - 100%)    Parameters below as of 2024 11:00  Patient On (Oxygen Delivery Method): nasal cannula w/ humidification  O2 Flow (L/min): 4      PHYSICAL EXAM:  Constitutional: A&Ox3. NAD  HEENT: MMM, PEERLA  Respiratory: non labored breathing, no respiratory distress. Appears comfortable on BIPAP.   Cardiovascular: NSR, RRR  Gastrointestinal: soft, NTND abdomen. Ileostomy without any output. ECF wound bed without stigmata of recent bleed, slightly blood tinged, light brown fluid in bag. Fistula present pink and well perfused, prolapsed.   Extremities: WWP, + BLE pitting edema    I&O's Detail    2024 07:01  -  2024 07:00  --------------------------------------------------------  IN:    freetext medication - Infusion: 600 mL    IV PiggyBack: 50 mL    Oral Fluid: 230 mL    PRBCs (Packed Red Blood Cells): 300 mL    TPN (Total Parenteral Nutrition): 1992 mL  Total IN: 3172 mL    OUT:    Drain (mL): 3750 mL    Other (mL): 980 mL    Voided (mL): 1075 mL  Total OUT: 5805 mL    Total NET: -2633 mL      2024 07:01  -  2024 10:34  --------------------------------------------------------  IN:    IV PiggyBack: 50 mL    TPN (Total Parenteral Nutrition): 332 mL  Total IN: 382 mL    OUT:    Drain (mL): 400 mL    freetext medication - Infusion: 0 mL    Voided (mL): 150 mL  Total OUT: 550 mL    Total NET: -168 mL          LABS:                        7.8    11.17 )-----------( 85       ( 2024 05:30 )             23.2     06-05    137  |  100  |  80<H>  ----------------------------<  158<H>  3.9   |  27  |  1.93<H>    Ca    8.4      2024 05:30  Phos  4.3     06-05  Mg     2.1     06-05    TPro  7.0  /  Alb  2.2<L>  /  TBili  11.6<H>  /  DBili  7.9<H>  /  AST  305<H>  /  ALT  41  /  AlkPhos  94  06-05    PT/INR - ( 2024 05:30 )   PT: 19.3 sec;   INR: 1.72          PTT - ( 2024 05:30 )  PTT:41.6 sec  Urinalysis Basic - ( 2024 08:56 )    Color: Dark Yellow / Appearance: Cloudy / S.020 / pH: x  Gluc: x / Ketone: Negative mg/dL  / Bili: Moderate / Urobili: 0.2 mg/dL   Blood: x / Protein: 30 mg/dL / Nitrite: Negative   Leuk Esterase: Negative / RBC: 2 /HPF / WBC 0 /HPF   Sq Epi: x / Non Sq Epi: 4 /HPF / Bacteria: Negative /HPF        RADIOLOGY & ADDITIONAL STUDIES:

## 2024-06-05 NOTE — PROGRESS NOTE ADULT - ASSESSMENT
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 Prolonged hospital course significant for recurrent AKIs, cholestatic transaminitis s/p perc cony, prior DVTs, PNAs, fungemia, sinus pauses s/p pacemaker, return to [facility] multiple times after discharge in Feb2024 for recurrent bleeding from ileostomy, fistula and wound bed, most recently s/p IR embolization of L branch of inferior epigastric artery via R groin access (4/16), S/p ileoscopy, found an ulcer w/ clot proximal to stoma w/ hemoclip applied (5/1). most recently transferred to SICU 5/8 overnight for 2L blood loss from fistula site with subsequent hypotension. s/p ileoscopy, noting erythematous patch of bowel just deep to the ECF, without any active bleeding (5/9). S/p IR angiography noting several areas of hyperemia in the jejunum adjacent to the stoma however no evidence of active bleeding (5/9), also noted MSSA bacteremia (5/9) likely due to line sepsis/PICC infection, started on Ancef, portal vein thrombosis per US from 5/13/24, but not candidate for anticoagulation due to persistent bleeding. Ultimately transferred from [facility] to [facility] 5/20 for possible surgical intervention for persist bleeding, but deemed not candidate for surgery due to portal hypertension. pt readmitted to [facility] Medicine tele on 5/29 for fistula wound bed bleeding. s/p PRBC transfusion. Pt transferred from [facility] to SICU on 6/1 for persistent fistula wound bed bleeding with acute blood loss anemia for close management. With total body overload, s/p L thoracentesis (6/4).     Neuro: Pain: Dilaudid PRN. Hx: Anxiety: Continue Effexor and Ativan PRN. Ambien on hold. Nausea: Zofran PRN.  CV: maintaining MAPs > 65 without pressors. Hx AFib: cont coreg, no AC given recurrent unprovoked bleed. Hx HLD: cont Atorvastatin. Now s/p albumin 25%. Total body overload. Weekly Epogen.  Pulm: Pleural Effusions b/l, no resp distress on Room air, nightly BIPAP at 40%. POCUS as needed, L thoracentesis (6/4). No further diuresis today given Cr  GI: persistent elevated T bili s/p Hepatology consults and workup in past, US with portal vein thrombosis 5/13, but unable to anticoagulate due to recurrent bleeding. Cont cholestyramine. C/w Ursodiol. Cont Creon. cont octreotide (in TPN), Crohns: cont hydrocortisone enemas via ileostomy, Continue TPN, smofflipids on Monday, Omegaven on Tues to Sunday.  holding off on remicade in setting of MSSA bacteremia. Dr Zhao consulted on interventional options for portal htn.   : Voids. BUN/Cr up slightly, cont monitor.   ID: MSSA Bacteremia MSSA from BCx (5/9), TTE negative for vegetation. 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 // PREVIOUS: Cefepime (5/10), Vanc x1 (5/9)  Endo: mISSc. Hx: Hypothyroidism. Continue synthroid. insulin in TPN, watch fingerstick.   PPx: SCDs, SQH on hold for bleeding.   Heme: acute blood loss anemia, likely due to EC fistula area bleed. Received multiple PRBCs, FFP, plt and cryo in recent admission. seen by Leticia in recent admission. Hgb 7.2 this morning with bleeding from fistula wound bed area last night. transfuse 1u PRBC.   Wounds: Wound manager managed by Wound care team.   Lines: double lumen L PICC (5/17--)  PT: OOBTC daily.   Dispo: SICU

## 2024-06-05 NOTE — PROGRESS NOTE ADULT - SUBJECTIVE AND OBJECTIVE BOX
ON: CBC hg 7.3. Bled at 11 o'clock position; about 1 L blood mixed with secretions in cannister - controlled with pressure, surgicel and fibrillar. 1u pRBC given. Metoprolol held for SBP < 100.       SUBJECTIVE: Pt seen and examined at bedside this am by ICU team. Patient is lying comfortably in bed with no complaints. Tolerating diet, pain well controlled with current regimen. Patient denies fever, nausea, vomiting, chest pain, and shortness of breath. Patient is passing gas and having bowel movements.       MEDICATIONS  (STANDING):  allopurinol 300 milliGRAM(s) Oral every 24 hours  atorvastatin 20 milliGRAM(s) Oral at bedtime  carvedilol 6.25 milliGRAM(s) Oral every 12 hours  ceFAZolin   IVPB 2000 milliGRAM(s) IV Intermittent every 12 hours  chlorhexidine 2% Cloths 1 Application(s) Topical <User Schedule>  cholestyramine Powder (Sugar-Free) 4 Gram(s) Oral every 24 hours  dextrose 10% Bolus 125 milliLiter(s) IV Bolus once  dextrose 5%. 1000 milliLiter(s) (100 mL/Hr) IV Continuous <Continuous>  dextrose 5%. 1000 milliLiter(s) (50 mL/Hr) IV Continuous <Continuous>  dextrose 50% Injectable 25 Gram(s) IV Push once  dextrose 50% Injectable 12.5 Gram(s) IV Push once  epoetin matt (EPOGEN) Injectable 53820 Unit(s) IV Push every 7 days  ergocalciferol 43997 Unit(s) Oral <User Schedule>  glucagon  Injectable 1 milliGRAM(s) IntraMuscular once  hydrocortisone Enema 100 milliGRAM(s) Rectal every 12 hours  insulin lispro (ADMELOG) corrective regimen sliding scale   SubCutaneous three times a day before meals  insulin lispro (ADMELOG) corrective regimen sliding scale   SubCutaneous at bedtime  levothyroxine 50 MICROGram(s) Oral every 24 hours  Omegaven 10grams/100 mL 60 Gram(s) 60 Gram(s) (50 mL/Hr) IV Continuous <Continuous>  ondansetron Injectable 4 milliGRAM(s) IV Push every 6 hours  pancrelipase  (CREON 24,000 Lipase Units) 1 Capsule(s) Oral three times a day with meals  Parenteral Nutrition - Adult 1 Each (83 mL/Hr) TPN Continuous <Continuous>  Parenteral Nutrition - Adult 1 Each (83 mL/Hr) TPN Continuous <Continuous>  ursodiol Capsule 300 milliGRAM(s) Oral every 8 hours  venlafaxine XR. 300 milliGRAM(s) Oral every 24 hours    MEDICATIONS  (PRN):  albuterol/ipratropium for Nebulization 3 milliLiter(s) Nebulizer every 6 hours PRN Shortness of Breath and/or Wheezing  dextrose Oral Gel 15 Gram(s) Oral once PRN Blood Glucose LESS THAN 70 milliGRAM(s)/deciliter  LORazepam   Injectable 0.25 milliGRAM(s) IV Push every 24 hours PRN Anxiety  melatonin 5 milliGRAM(s) Oral at bedtime PRN Insomnia      Drips:     ICU Vital Signs Last 24 Hrs  T(C): 36.7 (2024 10:00), Max: 37 (2024 05:11)  T(F): 98 (2024 10:00), Max: 98.6 (2024 05:11)  HR: 73 (2024 15:00) (68 - 77)  BP: 113/58 (2024 15:00) (95/46 - 134/55)  BP(mean): 83 (2024 15:00) (66 - 84)  ABP: --  ABP(mean): --  RR: 23 (2024 15:00) (16 - 24)  SpO2: 95% (2024 15:00) (92% - 100%)    O2 Parameters below as of 2024 15:00  Patient On (Oxygen Delivery Method): nasal cannula w/ humidification  O2 Flow (L/min): 4          Physical Exam:  General: NAD  HEENT: NC/AT, EOMI, PERRLA, normal hearing, no oral lesions, neck supple w/o LAD; scleral icterus, jaundice  Pulmonary: mildly distressed, on BiPAP. intermittent tachypnea; clear to auscultation b/l  Cardiovascular: NSR, no murmurs  Abdominal: soft, RUQ stoma involuted without stigmata of bleeding; LUQ fistula with bilious drainage, persistent ooze from mild oozing from 7'oclock position, pressure w/ fibrillar - hemostasis achieved  Extremities: WWP, pitting edema b/l lower extremity to knees  Neuro: A/O x3, CNs II-XII grossly intact, normal motor/sensation, no focal deficits  Pulses: palpable distal pulses      I&O's Summary    2024 07:01  -  2024 07:00  --------------------------------------------------------  IN: 3172 mL / OUT: 5805 mL / NET: -2633 mL    2024 07:01  -  2024 16:00  --------------------------------------------------------  IN: 797 mL / OUT: 1750 mL / NET: -953 mL        LABS:                        7.8    11.17 )-----------( 85       ( 2024 05:30 )             23.2     06-05    137  |  100  |  80<H>  ----------------------------<  158<H>  3.9   |  27  |  1.93<H>    Ca    8.4      2024 05:30  Phos  4.3     06-05  Mg     2.1     06-05    TPro  7.0  /  Alb  2.2<L>  /  TBili  11.6<H>  /  DBili  7.9<H>  /  AST  305<H>  /  ALT  41  /  AlkPhos  94  06-05    PT/INR - ( 2024 05:30 )   PT: 19.3 sec;   INR: 1.72          PTT - ( 2024 05:30 )  PTT:41.6 sec  Urinalysis Basic - ( 2024 08:56 )    Color: Dark Yellow / Appearance: Cloudy / S.020 / pH: x  Gluc: x / Ketone: Negative mg/dL  / Bili: Moderate / Urobili: 0.2 mg/dL   Blood: x / Protein: 30 mg/dL / Nitrite: Negative   Leuk Esterase: Negative / RBC: 2 /HPF / WBC 0 /HPF   Sq Epi: x / Non Sq Epi: 4 /HPF / Bacteria: Negative /HPF      CAPILLARY BLOOD GLUCOSE      POCT Blood Glucose.: 152 mg/dL (2024 12:02)  POCT Blood Glucose.: 175 mg/dL (2024 06:45)  POCT Blood Glucose.: 155 mg/dL (2024 22:20)  POCT Blood Glucose.: 170 mg/dL (2024 17:01)    LIVER FUNCTIONS - ( 2024 05:30 )  Alb: 2.2 g/dL / Pro: 7.0 g/dL / ALK PHOS: 94 U/L / ALT: 41 U/L / AST: 305 U/L / GGT: x             Cultures:Culture Results:   Testing in progress ( @ 13:13)      RADIOLOGY & ADDITIONAL STUDIES:     ON: CBC hg 7.3. Bled at 11 o'clock position; about 1 L blood mixed with secretions in cannister - controlled with pressure, surgicel and fibrillar. 1u pRBC given. Metoprolol held for SBP < 100.       SUBJECTIVE: Pt seen and examined at bedside this am by ICU team. Patient is lying comfortably in bed with no complaints. Tolerating diet, pain well controlled with current regimen. Patient denies fever, nausea, vomiting, chest pain, and shortness of breath.     MEDICATIONS  (STANDING):  allopurinol 300 milliGRAM(s) Oral every 24 hours  atorvastatin 20 milliGRAM(s) Oral at bedtime  carvedilol 6.25 milliGRAM(s) Oral every 12 hours  ceFAZolin   IVPB 2000 milliGRAM(s) IV Intermittent every 12 hours  chlorhexidine 2% Cloths 1 Application(s) Topical <User Schedule>  cholestyramine Powder (Sugar-Free) 4 Gram(s) Oral every 24 hours  dextrose 10% Bolus 125 milliLiter(s) IV Bolus once  dextrose 5%. 1000 milliLiter(s) (100 mL/Hr) IV Continuous <Continuous>  dextrose 5%. 1000 milliLiter(s) (50 mL/Hr) IV Continuous <Continuous>  dextrose 50% Injectable 25 Gram(s) IV Push once  dextrose 50% Injectable 12.5 Gram(s) IV Push once  epoetin matt (EPOGEN) Injectable 41198 Unit(s) IV Push every 7 days  ergocalciferol 23362 Unit(s) Oral <User Schedule>  glucagon  Injectable 1 milliGRAM(s) IntraMuscular once  hydrocortisone Enema 100 milliGRAM(s) Rectal every 12 hours  insulin lispro (ADMELOG) corrective regimen sliding scale   SubCutaneous three times a day before meals  insulin lispro (ADMELOG) corrective regimen sliding scale   SubCutaneous at bedtime  levothyroxine 50 MICROGram(s) Oral every 24 hours  Omegaven 10grams/100 mL 60 Gram(s) 60 Gram(s) (50 mL/Hr) IV Continuous <Continuous>  ondansetron Injectable 4 milliGRAM(s) IV Push every 6 hours  pancrelipase  (CREON 24,000 Lipase Units) 1 Capsule(s) Oral three times a day with meals  Parenteral Nutrition - Adult 1 Each (83 mL/Hr) TPN Continuous <Continuous>  Parenteral Nutrition - Adult 1 Each (83 mL/Hr) TPN Continuous <Continuous>  ursodiol Capsule 300 milliGRAM(s) Oral every 8 hours  venlafaxine XR. 300 milliGRAM(s) Oral every 24 hours    MEDICATIONS  (PRN):  albuterol/ipratropium for Nebulization 3 milliLiter(s) Nebulizer every 6 hours PRN Shortness of Breath and/or Wheezing  dextrose Oral Gel 15 Gram(s) Oral once PRN Blood Glucose LESS THAN 70 milliGRAM(s)/deciliter  LORazepam   Injectable 0.25 milliGRAM(s) IV Push every 24 hours PRN Anxiety  melatonin 5 milliGRAM(s) Oral at bedtime PRN Insomnia      Drips:     ICU Vital Signs Last 24 Hrs  T(C): 36.7 (2024 10:00), Max: 37 (2024 05:11)  T(F): 98 (2024 10:00), Max: 98.6 (2024 05:11)  HR: 73 (2024 15:00) (68 - 77)  BP: 113/58 (2024 15:00) (95/46 - 134/55)  BP(mean): 83 (2024 15:00) (66 - 84)  ABP: --  ABP(mean): --  RR: 23 (2024 15:00) (16 - 24)  SpO2: 95% (2024 15:00) (92% - 100%)    O2 Parameters below as of 2024 15:00  Patient On (Oxygen Delivery Method): nasal cannula w/ humidification  O2 Flow (L/min): 4          Physical Exam:  General: NAD  HEENT: NC/AT, EOMI, PERRLA, normal hearing, no oral lesions, neck supple w/o LAD; scleral icterus, jaundice  Pulmonary: mildly distressed, on BiPAP. intermittent tachypnea; clear to auscultation b/l  Cardiovascular: NSR, no murmurs  Abdominal: soft, RUQ stoma involuted without stigmata of bleeding; LUQ fistula with bilious drainage, persistent ooze from mild oozing from 7'oclock position, pressure w/ fibrillar - hemostasis achieved  Extremities: WWP, pitting edema b/l lower extremity to knees  Neuro: A/O x3, CNs II-XII grossly intact, normal motor/sensation, no focal deficits  Pulses: palpable distal pulses      I&O's Summary    2024 07:01  -  2024 07:00  --------------------------------------------------------  IN: 3172 mL / OUT: 5805 mL / NET: -2633 mL    2024 07:01  -  2024 16:00  --------------------------------------------------------  IN: 797 mL / OUT: 1750 mL / NET: -953 mL        LABS:                        7.8    11.17 )-----------( 85       ( 2024 05:30 )             23.2     06-05    137  |  100  |  80<H>  ----------------------------<  158<H>  3.9   |  27  |  1.93<H>    Ca    8.4      2024 05:30  Phos  4.3     06-05  Mg     2.1     06-05    TPro  7.0  /  Alb  2.2<L>  /  TBili  11.6<H>  /  DBili  7.9<H>  /  AST  305<H>  /  ALT  41  /  AlkPhos  94  06-05    PT/INR - ( 2024 05:30 )   PT: 19.3 sec;   INR: 1.72          PTT - ( 2024 05:30 )  PTT:41.6 sec  Urinalysis Basic - ( 2024 08:56 )    Color: Dark Yellow / Appearance: Cloudy / S.020 / pH: x  Gluc: x / Ketone: Negative mg/dL  / Bili: Moderate / Urobili: 0.2 mg/dL   Blood: x / Protein: 30 mg/dL / Nitrite: Negative   Leuk Esterase: Negative / RBC: 2 /HPF / WBC 0 /HPF   Sq Epi: x / Non Sq Epi: 4 /HPF / Bacteria: Negative /HPF      CAPILLARY BLOOD GLUCOSE      POCT Blood Glucose.: 152 mg/dL (2024 12:02)  POCT Blood Glucose.: 175 mg/dL (2024 06:45)  POCT Blood Glucose.: 155 mg/dL (2024 22:20)  POCT Blood Glucose.: 170 mg/dL (2024 17:01)    LIVER FUNCTIONS - ( 2024 05:30 )  Alb: 2.2 g/dL / Pro: 7.0 g/dL / ALK PHOS: 94 U/L / ALT: 41 U/L / AST: 305 U/L / GGT: x             Cultures:Culture Results:   Testing in progress ( @ 13:13)      RADIOLOGY & ADDITIONAL STUDIES:

## 2024-06-05 NOTE — PROGRESS NOTE ADULT - CRITICAL CARE ATTENDING COMMENT
Patient seen and examined;  Multiple discussions took place with various consultants  For now will try to keep NPO;  Transfuse as needed;  No diuretics today with rising creatinine   Also no angio  Palliative care starting to see patient ;

## 2024-06-06 NOTE — PROGRESS NOTE ADULT - PROBLEM SELECTOR PLAN 5
.  Complex medical decision making / symptom management in the setting of critical illness.    Will continue to follow for ongoing GOC discussion / titration of palliative regimen. Emotional support provided, questions answered.  Active Psychosocial Referrals:  [x]Social Work/Case management [x]PT/OT []Chaplaincy []Hospice  [x]Patient/Family Support []Holistic RN [x]Massage Therapy [x]Music Therapy []Ethics  Coping: [x] well [] with difficulty [] poor coping [] unable to assess  Support system: [x] strong [] adequate [] inadequate    For new or uncontrolled symptoms, please call Palliative Care at [phone]). The service is available 24/7 (including nights & weekends) to provide symptom management recommendations over the phone as appropriate

## 2024-06-06 NOTE — PROGRESS NOTE ADULT - PROBLEM SELECTOR PLAN 4
.  Patient is Full Code  -wife is designated healthcare agent  -ongoing rapport building, support, and exploration of GOC

## 2024-06-06 NOTE — PROGRESS NOTE ADULT - SUBJECTIVE AND OBJECTIVE BOX
INTERVAL HPI/OVERNIGHT EVENTS: Bled twice at 7 - 10 o'clock position; initially minimal blood loss (150 cc of bile + blood), controlled with pressure and hemostatic agent per RN. 2nd episode was about 350 cc, controlled again with pressure. MAP/SBP dropped and pt felt more SOB. 1 u pRBC ordered for symptomatic anemia. Persistent nausea overnight requiring Zofran and Reglan 10 x 2. QTc 459     SUBJECTIVE: Patient seen and examined at bedside. On BiPAP for subjective sob, difficult to obtain subjective however denies pain, n/v, cp.      carvedilol 6.25 milliGRAM(s) Oral every 12 hours  ceFAZolin   IVPB 2000 milliGRAM(s) IV Intermittent every 12 hours      Vital Signs Last 24 Hrs  T(C): 36.5 (06 Jun 2024 09:00), Max: 36.7 (05 Jun 2024 10:00)  T(F): 97.7 (06 Jun 2024 09:00), Max: 98 (05 Jun 2024 10:00)  HR: 64 (06 Jun 2024 09:00) (64 - 76)  BP: 101/55 (06 Jun 2024 09:00) (92/43 - 131/50)  BP(mean): 72 (06 Jun 2024 09:00) (62 - 84)  RR: 20 (06 Jun 2024 09:00) (16 - 31)  SpO2: 97% (06 Jun 2024 09:00) (86% - 100%)    Parameters below as of 06 Jun 2024 10:00  Patient On (Oxygen Delivery Method): BiPAP/CPAP      I&O's Detail    05 Jun 2024 07:01  -  06 Jun 2024 07:00  --------------------------------------------------------  IN:    freetext medication - Infusion: 500 mL    IV PiggyBack: 50 mL    Oral Fluid: 1250 mL    PRBCs (Packed Red Blood Cells): 300 mL    TPN (Total Parenteral Nutrition): 1826 mL  Total IN: 3926 mL    OUT:    Drain (mL): 4325 mL    freetext medication - Infusion: 0 mL    Ileostomy (mL): 150 mL    Voided (mL): 980 mL  Total OUT: 5455 mL    Total NET: -1529 mL      06 Jun 2024 07:01  -  06 Jun 2024 09:39  --------------------------------------------------------  IN:    TPN (Total Parenteral Nutrition): 83 mL  Total IN: 83 mL    OUT:  Total OUT: 0 mL    Total NET: 83 mL          General: NAD, resting comfortably in bed  C/V: atrial fibrillation  Pulm: comfortable on BiPAP  Abd: soft, nontender, nondistended; large EC fistula with bilious output however some evidence of recent bleed in wound manager; fistula present and prolapsed; stoma small/prolapsed and without output.  Extrem: WWP, 1+ edema, SCDs in place    LABS:                        7.6    11.87 )-----------( 77       ( 06 Jun 2024 04:13 )             23.2     06-06    139  |  99  |  93<H>  ----------------------------<  157<H>  3.9   |  30  |  2.23<H>    Ca    7.9<L>      06 Jun 2024 04:13  Phos  5.1     06-06  Mg     2.1     06-06    TPro  6.5  /  Alb  1.9<L>  /  TBili  11.4<H>  /  DBili  7.7<H>  /  AST  326<H>  /  ALT  44  /  AlkPhos  82  06-06    PT/INR - ( 05 Jun 2024 05:30 )   PT: 19.3 sec;   INR: 1.72          PTT - ( 05 Jun 2024 05:30 )  PTT:41.6 sec  Urinalysis Basic - ( 06 Jun 2024 04:13 )    Color: x / Appearance: x / SG: x / pH: x  Gluc: 157 mg/dL / Ketone: x  / Bili: x / Urobili: x   Blood: x / Protein: x / Nitrite: x   Leuk Esterase: x / RBC: x / WBC x   Sq Epi: x / Non Sq Epi: x / Bacteria: x

## 2024-06-06 NOTE — PROGRESS NOTE ADULT - ASSESSMENT
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 Prolonged hospital course significant for recurrent AKIs, cholestatic transaminitis s/p perc cony, prior DVTs, PNAs, fungemia, sinus pauses s/p pacemaker, return to [facility] multiple times after discharge in Feb2024 for recurrent bleeding from ileostomy, fistula and wound bed, most recently s/p IR embolization of L branch of inferior epigastric artery via R groin access (4/16), S/p ileoscopy, found an ulcer w/ clot proximal to stoma w/ hemoclip applied (5/1). most recently transferred to SICU 5/8 overnight for 2L blood loss from fistula site with subsequent hypotension. s/p ileoscopy, noting erythematous patch of bowel just deep to the ECF, without any active bleeding (5/9). S/p IR angiography noting several areas of hyperemia in the jejunum adjacent to the stoma however no evidence of active bleeding (5/9), also noted MSSA bacteremia (5/9) likely due to line sepsis/PICC infection, started on Ancef, portal vein thrombosis per US from 5/13/24, but not candidate for anticoagulation due to persistent bleeding. Ultimately transferred from [facility] to [facility] 5/20 for possible surgical intervention for persist bleeding, but deemed not candidate for surgery due to portal hypertension. pt readmitted to [facility] Medicine tele on 5/29 for fistula wound bed bleeding. s/p PRBC transfusion. Pt transferred from [facility] to SICU on 6/1 for persistent fistula wound bed bleeding with acute blood loss anemia for close management. With total body overload, s/p L thoracentesis (6/4).     Transfuse as necessary  f/u palliative recs  Please touch base with ID re duration of abx to confirm if end date should still be in 48 hours  Appreciate excellent SICU care

## 2024-06-06 NOTE — PROGRESS NOTE ADULT - ASSESSMENT
·	continued emotional support and symptom assessment for patient  ·	introduced the role of Palliative to patient's wife and son, made a plan to meet next early week 79yo M with PMH of Crohn's, AFib, CKD, Enterocutaneous Fistula, and multiple long complicated hospital courses p/w bleeding and non-healing wound. Palliative consulted for complex medical decision making in the setting of critical illness.    ·	continued emotional support and symptom assessment for patient  ·	introduced the role of Palliative to patient's wife and son, made a plan to meet next early week

## 2024-06-06 NOTE — PROGRESS NOTE ADULT - SUBJECTIVE AND OBJECTIVE BOX
[facility] and Palliative [facility]  Andrea Harper, Palliative Care Attending  Contact Info: Call [phone] (HEAL Line) or message on Microsoft Teams (Andrea Harper)    SUBJECTIVE AND OBJECTIVE:  INTERVAL HPI/OVERNIGHT EVENTS: Interval events noted. See patient's PRN use for the past 24hrs noted below. Comprehensive symptom assessment and GOC exploration as noted below. Extensive time spent discussing plan of care with patient/family.     ALLERGIES:  penicillin (Angioedema)    MEDICATIONS  (STANDING):  allopurinol 300 milliGRAM(s) Oral every 24 hours  atorvastatin 20 milliGRAM(s) Oral at bedtime  carvedilol 6.25 milliGRAM(s) Oral every 12 hours  ceFAZolin   IVPB 2000 milliGRAM(s) IV Intermittent every 12 hours  chlorhexidine 2% Cloths 1 Application(s) Topical <User Schedule>  cholestyramine Powder (Sugar-Free) 4 Gram(s) Oral every 24 hours  dextrose 10% Bolus 125 milliLiter(s) IV Bolus once  dextrose 5%. 1000 milliLiter(s) (100 mL/Hr) IV Continuous <Continuous>  dextrose 5%. 1000 milliLiter(s) (50 mL/Hr) IV Continuous <Continuous>  dextrose 50% Injectable 25 Gram(s) IV Push once  dextrose 50% Injectable 12.5 Gram(s) IV Push once  epoetin matt (EPOGEN) Injectable 91660 Unit(s) IV Push every 7 days  ergocalciferol 21925 Unit(s) Oral <User Schedule>  glucagon  Injectable 1 milliGRAM(s) IntraMuscular once  hydrocortisone Enema 100 milliGRAM(s) Rectal every 12 hours  insulin lispro (ADMELOG) corrective regimen sliding scale   SubCutaneous at bedtime  insulin lispro (ADMELOG) corrective regimen sliding scale   SubCutaneous three times a day before meals  levothyroxine 50 MICROGram(s) Oral every 24 hours  Omegaven 10grams/100mL 60 Gram(s) 60 Gram(s) (50 mL/Hr) IV Continuous <Continuous>  ondansetron Injectable 4 milliGRAM(s) IV Push every 6 hours  pancrelipase  (CREON 24,000 Lipase Units) 1 Capsule(s) Oral three times a day with meals  Parenteral Nutrition - Adult 1 Each (83 mL/Hr) TPN Continuous <Continuous>  Parenteral Nutrition - Adult 1 Each (83 mL/Hr) TPN Continuous <Continuous>  ursodiol Capsule 300 milliGRAM(s) Oral every 8 hours  venlafaxine XR. 300 milliGRAM(s) Oral every 24 hours    MEDICATIONS  (PRN):  albuterol/ipratropium for Nebulization 3 milliLiter(s) Nebulizer every 6 hours PRN Shortness of Breath and/or Wheezing  dextrose Oral Gel 15 Gram(s) Oral once PRN Blood Glucose LESS THAN 70 milliGRAM(s)/deciliter  LORazepam   Injectable 0.25 milliGRAM(s) IV Push every 24 hours PRN Anxiety  melatonin 5 milliGRAM(s) Oral at bedtime PRN Insomnia      Analgesic Use (Scheduled and PRNs) for past 24 hours:    LORazepam   Injectable   0.25 milliGRAM(s) IV Push (06-05-24 @ 21:16)    melatonin   5 milliGRAM(s) Oral (06-05-24 @ 21:08)    metoclopramide Injectable   10 milliGRAM(s) IV Push (06-06-24 @ 15:13)    metoclopramide Injectable   10 milliGRAM(s) IV Push (06-05-24 @ 21:10)    ondansetron Injectable   4 milliGRAM(s) IV Push (06-06-24 @ 17:05)   4 milliGRAM(s) IV Push (06-06-24 @ 12:08)   4 milliGRAM(s) IV Push (06-06-24 @ 02:01)   4 milliGRAM(s) IV Push (06-05-24 @ 17:55)    venlafaxine XR.   300 milliGRAM(s) Oral (06-06-24 @ 05:36)      ITEMS UNCHECKED ARE NOT PRESENT  PRESENT SYMPTOMS/REVIEW OF SYSTEMS: []Unable to obtain due to poor mentation   Source if other than patient:  []Family   []Team         Vital Signs Last 24 Hrs  T(C): 36.5 (06 Jun 2024 09:00), Max: 36.7 (05 Jun 2024 17:55)  T(F): 97.7 (06 Jun 2024 09:00), Max: 98 (05 Jun 2024 17:55)  HR: 71 (06 Jun 2024 17:00) (64 - 75)  BP: 110/54 (06 Jun 2024 17:00) (92/43 - 134/63)  BP(mean): 78 (06 Jun 2024 17:00) (62 - 91)  RR: 23 (06 Jun 2024 17:00) (16 - 31)  SpO2: 99% (06 Jun 2024 17:00) (86% - 100%)    Parameters below as of 06 Jun 2024 18:00  Patient On (Oxygen Delivery Method): nasal cannula w/ humidification  O2 Flow (L/min): 2      LABS: Personally reviewed and interpreted                        7.6    11.87 )-----------( 77       ( 06 Jun 2024 04:13 )             23.2   06-06    139  |  99  |  93<H>  ----------------------------<  157<H>  3.9   |  30  |  2.23<H>    Ca    7.9<L>      06 Jun 2024 04:13  Phos  5.1     06-06  Mg     2.1     06-06    TPro  6.5  /  Alb  1.9<L>  /  TBili  11.4<H>  /  DBili  7.7<H>  /  AST  326<H>  /  ALT  44  /  AlkPhos  82  06-06      RADIOLOGY & ADDITIONAL STUDIES: Personally reviewed and interpreted  None new    DISCUSSION OF CASE: Family - to provide updates and emotional support; Primary Team/RN - to discuss plan of care [facility] and [facility]  Andrea Harper, Palliative Care Attending  Contact Info: Call [phone] (HEAL Line) or message on Microsoft Teams (Andrea Harper)    SUBJECTIVE AND OBJECTIVE:  INTERVAL HPI/OVERNIGHT EVENTS: Interval events noted. Nausea is improved with scheduled anti-emetics. Asking for water and ice chips.  See patient's PRN use for the past 24hrs noted below. Comprehensive symptom assessment and GOC exploration as noted below. Extensive time spent discussing plan of care with patient/family.     ALLERGIES:  penicillin (Angioedema)    MEDICATIONS  (STANDING):  allopurinol 300 milliGRAM(s) Oral every 24 hours  atorvastatin 20 milliGRAM(s) Oral at bedtime  carvedilol 6.25 milliGRAM(s) Oral every 12 hours  ceFAZolin   IVPB 2000 milliGRAM(s) IV Intermittent every 12 hours  chlorhexidine 2% Cloths 1 Application(s) Topical <User Schedule>  cholestyramine Powder (Sugar-Free) 4 Gram(s) Oral every 24 hours  dextrose 10% Bolus 125 milliLiter(s) IV Bolus once  dextrose 5%. 1000 milliLiter(s) (100 mL/Hr) IV Continuous <Continuous>  dextrose 5%. 1000 milliLiter(s) (50 mL/Hr) IV Continuous <Continuous>  dextrose 50% Injectable 25 Gram(s) IV Push once  dextrose 50% Injectable 12.5 Gram(s) IV Push once  epoetin matt (EPOGEN) Injectable 90681 Unit(s) IV Push every 7 days  ergocalciferol 41307 Unit(s) Oral <User Schedule>  glucagon  Injectable 1 milliGRAM(s) IntraMuscular once  hydrocortisone Enema 100 milliGRAM(s) Rectal every 12 hours  insulin lispro (ADMELOG) corrective regimen sliding scale   SubCutaneous at bedtime  insulin lispro (ADMELOG) corrective regimen sliding scale   SubCutaneous three times a day before meals  levothyroxine 50 MICROGram(s) Oral every 24 hours  Omegaven 10grams/100mL 60 Gram(s) 60 Gram(s) (50 mL/Hr) IV Continuous <Continuous>  ondansetron Injectable 4 milliGRAM(s) IV Push every 6 hours  pancrelipase  (CREON 24,000 Lipase Units) 1 Capsule(s) Oral three times a day with meals  Parenteral Nutrition - Adult 1 Each (83 mL/Hr) TPN Continuous <Continuous>  Parenteral Nutrition - Adult 1 Each (83 mL/Hr) TPN Continuous <Continuous>  ursodiol Capsule 300 milliGRAM(s) Oral every 8 hours  venlafaxine XR. 300 milliGRAM(s) Oral every 24 hours    MEDICATIONS  (PRN):  albuterol/ipratropium for Nebulization 3 milliLiter(s) Nebulizer every 6 hours PRN Shortness of Breath and/or Wheezing  dextrose Oral Gel 15 Gram(s) Oral once PRN Blood Glucose LESS THAN 70 milliGRAM(s)/deciliter  LORazepam   Injectable 0.25 milliGRAM(s) IV Push every 24 hours PRN Anxiety  melatonin 5 milliGRAM(s) Oral at bedtime PRN Insomnia      Analgesic Use (Scheduled and PRNs) for past 24 hours:    LORazepam   Injectable   0.25 milliGRAM(s) IV Push (06-05-24 @ 21:16)    melatonin   5 milliGRAM(s) Oral (06-05-24 @ 21:08)    metoclopramide Injectable   10 milliGRAM(s) IV Push (06-06-24 @ 15:13)    metoclopramide Injectable   10 milliGRAM(s) IV Push (06-05-24 @ 21:10)    ondansetron Injectable   4 milliGRAM(s) IV Push (06-06-24 @ 17:05)   4 milliGRAM(s) IV Push (06-06-24 @ 12:08)   4 milliGRAM(s) IV Push (06-06-24 @ 02:01)   4 milliGRAM(s) IV Push (06-05-24 @ 17:55)    venlafaxine XR.   300 milliGRAM(s) Oral (06-06-24 @ 05:36)      ITEMS UNCHECKED ARE NOT PRESENT  PRESENT SYMPTOMS/REVIEW OF SYSTEMS: []Unable to obtain due to poor mentation   Source if other than patient:  []Family   []Team     Pain: [] yes [x] no  QOL impact -   Location -                    Aggravating Factors -  Quality -  Radiation -  Timing -  Severity (0-10 scale) -   Minimal Acceptable Level (0-10 scale) -    Dyspnea:                           [x]Mild  []Moderate []Severe  Anxiety:                             []Mild []Moderate []Severe  Fatigue:                             []Mild []Moderate []Severe  Nausea:                             []Mild [x]Moderate []Severe  Loss of Appetite:              []Mild []Moderate []Severe  Constipation:                    []Mild []Moderate []Severe    Other Symptoms:  [x]All other review of systems negative     Palliative Performance Status Version 2:  30%  (Functional Assessment Tool)    GENERAL:  [x] NAD [x]Alert []Lethargic  []Cachexia  []Unarousable  [x] Verbal  []Non-Verbal  BEHAVIORAL:   []Anxiety  [x]Delirium []Agitation []Cooperative []Oriented x  HEENT:  [x]Normal  [x] Moist Mucous Membranes []Dry mouth   []ET Tube/Trach  []Oral lesions  PULMONARY:   []Clear []Tachypnea  []Audible excessive secretions  []Normal Work of Breathing [x]Labored Breathing  [x]Rhonchi []Crackles []Wheezing  CARDIOVASCULAR:    [x]Regular Rate [x]Regular Rhythm []Irregular []Tachy  []Arie  GASTROINTESTINAL:  [x]Soft  [x]Distended   [x]+BS  []Non tender [x]Tender  []PEG []OGT/ NGT  Last BM: +ileostomy  GENITOURINARY:  []Normal [] Incontinent   [x]Oliguria/Anuria   []Poole  MUSCULOSKELETAL:   []Normal Extremities  [x]Weakness  [x]Bed/Wheelchair bound []Edema  NEUROLOGIC:   []No focal deficits  []Cognitive impairment  []Dysphagia []Dysarthria []Paresis [x]Encephalopathic  SKIN:   []Normal   []Pressure ulcer(s)  [x]Abdominal Wound    Vital Signs Last 24 Hrs  T(C): 36.5 (06 Jun 2024 09:00), Max: 36.7 (05 Jun 2024 17:55)  T(F): 97.7 (06 Jun 2024 09:00), Max: 98 (05 Jun 2024 17:55)  HR: 71 (06 Jun 2024 17:00) (64 - 75)  BP: 110/54 (06 Jun 2024 17:00) (92/43 - 134/63)  BP(mean): 78 (06 Jun 2024 17:00) (62 - 91)  RR: 23 (06 Jun 2024 17:00) (16 - 31)  SpO2: 99% (06 Jun 2024 17:00) (86% - 100%)    Parameters below as of 06 Jun 2024 18:00  Patient On (Oxygen Delivery Method): nasal cannula w/ humidification  O2 Flow (L/min): 2      LABS: Personally reviewed and interpreted                      7.6    11.87 )-----------( 77       ( 06 Jun 2024 04:13 )             23.2   06-06    139  |  99  |  93<H>  ----------------------------<  157<H>  3.9   |  30  |  2.23<H>    Ca    7.9<L>      06 Jun 2024 04:13  Phos  5.1     06-06  Mg     2.1     06-06  TPro  6.5  /  Alb  1.9<L>  /  TBili  11.4<H>  /  DBili  7.7<H>  /  AST  326<H>  /  ALT  44  /  AlkPhos  82  06-06    RADIOLOGY & ADDITIONAL STUDIES: Personally reviewed and interpreted  None new    DISCUSSION OF CASE: Family - to provide updates and emotional support; Primary Team/RN - to discuss plan of care

## 2024-06-06 NOTE — PROGRESS NOTE ADULT - ASSESSMENT
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 Prolonged hospital course significant for recurrent AKIs, cholestatic transaminitis s/p perc cony, prior DVTs, PNAs, fungemia, sinus pauses s/p pacemaker, return to [facility] multiple times after discharge in Feb2024 for recurrent bleeding from ileostomy, fistula and wound bed, most recently s/p IR embolization of L branch of inferior epigastric artery via R groin access (4/16), S/p ileoscopy, found an ulcer w/ clot proximal to stoma w/ hemoclip applied (5/1). most recently transferred to SICU 5/8 overnight for 2L blood loss from fistula site with subsequent hypotension. s/p ileoscopy, noting erythematous patch of bowel just deep to the ECF, without any active bleeding (5/9). S/p IR angiography noting several areas of hyperemia in the jejunum adjacent to the stoma however no evidence of active bleeding (5/9), also noted MSSA bacteremia (5/9) likely due to line sepsis/PICC infection, started on Ancef, portal vein thrombosis per US from 5/13/24, but not candidate for anticoagulation due to persistent bleeding. Ultimately transferred from [facility] to [facility] 5/20 for possible surgical intervention for persist bleeding, but deemed not candidate for surgery due to portal hypertension. pt readmitted to [facility] Medicine tele on 5/29 for fistula wound bed bleeding. s/p PRBC transfusion. Pt transferred from [facility] to SICU on 6/1 for persistent fistula wound bed bleeding with acute blood loss anemia for close management. With total body overload, s/p L thoracentesis (6/4).     Neuro: Pain: Dilaudid PRN. Hx: Anxiety: Continue Effexor and Ativan PRN. Ambien on hold. Nausea: Zofran PRN.  CV: maintaining MAPs > 65 without pressors. Hx AFib: cont coreg, no AC given recurrent unprovoked bleed. Hx HLD: cont Atorvastatin. Now s/p albumin 25%. Total body overload. Weekly Epogen.  Pulm: Pleural Effusions b/l, no resp distress on Room air, nightly BIPAP at 40%. POCUS as needed, L thoracentesis (6/4). No further diuresis today given Cr  GI: persistent elevated T bili s/p Hepatology consults and workup in past, US with portal vein thrombosis 5/13, but unable to anticoagulate due to recurrent bleeding. Cont cholestyramine. C/w Ursodiol. Cont Creon. cont octreotide (in TPN), Crohns: cont hydrocortisone enemas via ileostomy, Continue TPN, smofflipids on Monday, Omegaven on Tues to Sunday.  holding off on remicade in setting of MSSA bacteremia. Dr Zhao consulted on interventional options for portal htn.   : Voids. BUN/Cr up slightly, cont monitor.   ID: MSSA Bacteremia MSSA from BCx (5/9), TTE negative for vegetation. 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 // PREVIOUS: Cefepime (5/10), Vanc x1 (5/9)  Endo: mISSc. Hx: Hypothyroidism. Continue synthroid. insulin in TPN, watch fingerstick.   PPx: SCDs, SQH on hold for bleeding.   Heme: acute blood loss anemia, likely due to EC fistula area bleed. Received multiple PRBCs, FFP, plt and cryo in recent admission. seen by Leticia in recent admission. Hgb 7.2 this morning with bleeding from fistula wound bed area last night. transfuse 1u PRBC.   Wounds: Wound manager managed by Wound care team.   Lines: double lumen L PICC (5/17--)  PT: OOBTC daily.   Dispo: SICU

## 2024-06-06 NOTE — PROGRESS NOTE ADULT - ASSESSMENT
78M with PMH of a fib/flutter with numerous DCCVs in the past with prolonged admission last year for Crohn's SBO (s/p open TRE and SBR c/b abdominal wall dehiscence and development of enteroatmospheric fistula), now presenting after recent admission from [facility], with bleeding from fistula site and a new diagnosis of portal hypertension based off of liver biopsy done at [facility] (wedge pressure of 17). Hepatology consulted for bleeding from ostomy and further management of portal hypertension.     Symptoms possibly 2/2 to bleeding from an ectopic varix. Tolerating Coreg well. Has had multiple episodes of bleeding now from fistula site, so IR (Dr. Zhao) was consulted.     Meld 3.0 score: 32 based on 06/05/2024 labs.     CT angio (06/03/24):   LIVER: Scattered hepatic cysts. No portal hypertension or varices.  BILE DUCTS: Nondilated.  GALLBLADDER: Normal.  SPLEEN: Normal.  PANCREAS: Mild diffuse duct dilatation.  BOWEL: Status post right hemicolectomy and ileostomy. No bowel obstruction. No active GI bleeding.  ABDOMINAL WALL: Open surgical wound.  IMPRESSION:  *  No bowel obstruction or active GI bleeding.  *  No definite evidence of portal hypertension or varices.    Recommendations:   - continue Coreg 6.25 mg PO BID and can up-titrate as blood pressure permits to better reduce portal HTN   - try Coreg 9.375 mg PO BID today to see if patient is able to tolerate slightly increased dose   - continue octreotide in TPN for bleeding related to portal hypertension   - IR consulted (Dr. Zhao) to determine if an intervention could be performed on an ectopic varix (sclerosis, embolization)   - patient would be a poor candidate for a TIPs procedure   - trend CBC, CMP, and INR daily   - obtain repeat echocardiogram     Case discussed with Dr. Romero. Hepatology Team will continue to follow.     Esme Barber D.O.   Gastroenterology Fellow  Weekday 7am-5pm Pager: [phone]  Weeknights/Weekend/Holiday Coverage: Please call the  for contact info 78M with PMH of a fib/flutter with numerous DCCVs in the past with prolonged admission last year for Crohn's SBO (s/p open TRE and SBR c/b abdominal wall dehiscence and development of enteroatmospheric fistula), now presenting after recent admission from [facility], with bleeding from fistula site and a new diagnosis of portal hypertension based off of liver biopsy done at [facility] (wedge pressure of 17). Hepatology consulted for bleeding from ostomy and further management of portal hypertension.     Symptoms possibly 2/2 to bleeding from an ectopic varix. Tolerating Coreg well. Has had multiple episodes of bleeding now from fistula site, so IR (Dr. Zhao) was consulted.     Meld 3.0 score: 32 based on 06/05/2024 labs.     CT angio (06/03/24):   LIVER: Scattered hepatic cysts. No portal hypertension or varices.  BILE DUCTS: Nondilated.  GALLBLADDER: Normal.  SPLEEN: Normal.  PANCREAS: Mild diffuse duct dilatation.  BOWEL: Status post right hemicolectomy and ileostomy. No bowel obstruction. No active GI bleeding.  ABDOMINAL WALL: Open surgical wound.  IMPRESSION:  *  No bowel obstruction or active GI bleeding.  *  No definite evidence of portal hypertension or varices.    Recommendations:   - continue Coreg 6.25 mg PO BID and can up-titrate as blood pressure permits to better reduce portal HTN   - continue octreotide in TPN for bleeding related to portal hypertension   - IR consulted (Dr. Zhao) to determine if an intervention could be performed on an ectopic varix (sclerosis, embolization)   - reviewed most recent CT angio with radiology and no obvious vessel (arterial or venous) causing bleeding at fistula identifiable to intervene upon   - patient would be a poor candidate for a TIPs procedure   - palliative care team consulted, recommendations appreciated   - trend CBC, CMP, and INR daily   - consider obtaining a repeat echocardiogram     Case discussed with Dr. Romero. Hepatology Team will continue to follow.     Esme Barber D.O.   Gastroenterology Fellow  Weekday 7am-5pm Pager: [phone]  Weeknights/Weekend/Holiday Coverage: Please call the  for contact info

## 2024-06-06 NOTE — PROGRESS NOTE ADULT - SUBJECTIVE AND OBJECTIVE BOX
ON:     SUBJECTIVE: Pt seen and examined at bedside this am by ICU team. Patient is lying comfortably in bed with no complaints. NPO, having ice chips. Denies pain. Patient denies fever, nausea, vomiting, chest pain, and shortness of breath.       MEDICATIONS  (STANDING):  allopurinol 300 milliGRAM(s) Oral every 24 hours  atorvastatin 20 milliGRAM(s) Oral at bedtime  carvedilol 6.25 milliGRAM(s) Oral every 12 hours  ceFAZolin   IVPB 2000 milliGRAM(s) IV Intermittent every 12 hours  chlorhexidine 2% Cloths 1 Application(s) Topical <User Schedule>  cholestyramine Powder (Sugar-Free) 4 Gram(s) Oral every 24 hours  dextrose 10% Bolus 125 milliLiter(s) IV Bolus once  dextrose 5%. 1000 milliLiter(s) (100 mL/Hr) IV Continuous <Continuous>  dextrose 5%. 1000 milliLiter(s) (50 mL/Hr) IV Continuous <Continuous>  dextrose 50% Injectable 25 Gram(s) IV Push once  dextrose 50% Injectable 12.5 Gram(s) IV Push once  epoetin matt (EPOGEN) Injectable 83872 Unit(s) IV Push every 7 days  ergocalciferol 39792 Unit(s) Oral <User Schedule>  glucagon  Injectable 1 milliGRAM(s) IntraMuscular once  hydrocortisone Enema 100 milliGRAM(s) Rectal every 12 hours  insulin lispro (ADMELOG) corrective regimen sliding scale   SubCutaneous three times a day before meals  insulin lispro (ADMELOG) corrective regimen sliding scale   SubCutaneous at bedtime  levothyroxine 50 MICROGram(s) Oral every 24 hours  metoclopramide Injectable 10 milliGRAM(s) IV Push once  Omegaven 10grams/100mL 60 Gram(s) 60 Gram(s) (50 mL/Hr) IV Continuous <Continuous>  ondansetron Injectable 4 milliGRAM(s) IV Push every 6 hours  pancrelipase  (CREON 24,000 Lipase Units) 1 Capsule(s) Oral three times a day with meals  Parenteral Nutrition - Adult 1 Each (83 mL/Hr) TPN Continuous <Continuous>  Parenteral Nutrition - Adult 1 Each (83 mL/Hr) TPN Continuous <Continuous>  ursodiol Capsule 300 milliGRAM(s) Oral every 8 hours  venlafaxine XR. 300 milliGRAM(s) Oral every 24 hours    MEDICATIONS  (PRN):  albuterol/ipratropium for Nebulization 3 milliLiter(s) Nebulizer every 6 hours PRN Shortness of Breath and/or Wheezing  dextrose Oral Gel 15 Gram(s) Oral once PRN Blood Glucose LESS THAN 70 milliGRAM(s)/deciliter  LORazepam   Injectable 0.25 milliGRAM(s) IV Push every 24 hours PRN Anxiety  melatonin 5 milliGRAM(s) Oral at bedtime PRN Insomnia      Drips:     ICU Vital Signs Last 24 Hrs  T(C): 36.5 (06 Jun 2024 09:00), Max: 36.7 (05 Jun 2024 17:55)  T(F): 97.7 (06 Jun 2024 09:00), Max: 98 (05 Jun 2024 17:55)  HR: 70 (06 Jun 2024 14:00) (64 - 74)  BP: 127/58 (06 Jun 2024 14:00) (92/43 - 134/63)  BP(mean): 84 (06 Jun 2024 14:00) (62 - 91)  ABP: --  ABP(mean): --  RR: 19 (06 Jun 2024 14:00) (16 - 31)  SpO2: 93% (06 Jun 2024 14:00) (86% - 100%)    O2 Parameters below as of 06 Jun 2024 15:00  Patient On (Oxygen Delivery Method): room air          Physical Exam:  General: NAD  HEENT: NC/AT, EOMI, PERRLA, normal hearing, no oral lesions, neck supple w/o LAD; scleral icterus, jaundice  Pulmonary: mildly distressed, on BiPAP. intermittent tachypnea; clear to auscultation b/l  Cardiovascular: NSR, no murmurs  Abdominal: soft, RUQ stoma involuted without stigmata of bleeding; LUQ fistula with bilious drainage no bleeding at this time  Extremities: WWP, pitting edema b/l lower extremity to knees, improved   Neuro: A/O x3, CNs II-XII grossly intact, normal motor/sensation, no focal deficits  Pulses: palpable distal pulses    I&O's Summary    05 Jun 2024 07:01  -  06 Jun 2024 07:00  --------------------------------------------------------  IN: 3926 mL / OUT: 5455 mL / NET: -1529 mL    06 Jun 2024 07:01  -  06 Jun 2024 14:28  --------------------------------------------------------  IN: 1714 mL / OUT: 1350 mL / NET: 364 mL        LABS:                        7.6    11.87 )-----------( 77       ( 06 Jun 2024 04:13 )             23.2     06-06    139  |  99  |  93<H>  ----------------------------<  157<H>  3.9   |  30  |  2.23<H>    Ca    7.9<L>      06 Jun 2024 04:13  Phos  5.1     06-06  Mg     2.1     06-06    TPro  6.5  /  Alb  1.9<L>  /  TBili  11.4<H>  /  DBili  7.7<H>  /  AST  326<H>  /  ALT  44  /  AlkPhos  82  06-06    PT/INR - ( 05 Jun 2024 05:30 )   PT: 19.3 sec;   INR: 1.72          PTT - ( 05 Jun 2024 05:30 )  PTT:41.6 sec  Urinalysis Basic - ( 06 Jun 2024 04:13 )    Color: x / Appearance: x / SG: x / pH: x  Gluc: 157 mg/dL / Ketone: x  / Bili: x / Urobili: x   Blood: x / Protein: x / Nitrite: x   Leuk Esterase: x / RBC: x / WBC x   Sq Epi: x / Non Sq Epi: x / Bacteria: x      CAPILLARY BLOOD GLUCOSE      POCT Blood Glucose.: 173 mg/dL (06 Jun 2024 11:50)  POCT Blood Glucose.: 147 mg/dL (06 Jun 2024 05:29)  POCT Blood Glucose.: 176 mg/dL (05 Jun 2024 21:03)  POCT Blood Glucose.: 180 mg/dL (05 Jun 2024 16:49)    LIVER FUNCTIONS - ( 06 Jun 2024 04:13 )  Alb: 1.9 g/dL / Pro: 6.5 g/dL / ALK PHOS: 82 U/L / ALT: 44 U/L / AST: 326 U/L / GGT: x             Cultures:    RADIOLOGY & ADDITIONAL STUDIES:

## 2024-06-06 NOTE — PROGRESS NOTE ADULT - PROBLEM SELECTOR PLAN 3
.  Complicated by ECF, non-healing wound, and recurrent bleeding  -current no surgical interventions planned, everyone is hoping to avoid return to the OR  -requiring frequent transfusions for bleeding

## 2024-06-06 NOTE — PROGRESS NOTE ADULT - TIME BILLING
Emotional Support/Supportive Care and Clarification of Potential Disease Trajectory related to Critical Illness  Assessment of Symptom [place] and Palliative regimen  Exploration of GOC/Advanced Directives including HCP designation, code status, and hospice eligibility    Time inclusive of chart review, medication ordering, discussion with primary team, clinical documentation, and communication with family/caregiver

## 2024-06-06 NOTE — PROGRESS NOTE ADULT - SUBJECTIVE AND OBJECTIVE BOX
Hepatology Consult Progress Note:     OVERNIGHT EVENTS: Bled twice overnight, so given 1U pRBCS.     SUBJECTIVE / INTERVAL HPI:   Patient seen and examined at bedside. Complaining of some nausea. Was treated with Zofran and Reglan.       VITAL SIGNS:  Vital Signs Last 24 Hrs  T(C): 36.4 (06 Jun 2024 05:11), Max: 36.7 (05 Jun 2024 10:00)  T(F): 97.5 (06 Jun 2024 05:11), Max: 98 (05 Jun 2024 10:00)  HR: 67 (06 Jun 2024 08:00) (65 - 76)  BP: 101/44 (06 Jun 2024 08:00) (92/43 - 131/50)  BP(mean): 63 (06 Jun 2024 08:00) (62 - 84)  RR: 16 (06 Jun 2024 08:00) (16 - 31)  SpO2: 100% (06 Jun 2024 08:00) (86% - 100%)    Parameters below as of 06 Jun 2024 09:00  Patient On (Oxygen Delivery Method): BiPAP/CPAP    O2 Concentration (%): 40    06-05-24 @ 07:01  -  06-06-24 @ 07:00  --------------------------------------------------------  IN: 3926 mL / OUT: 5455 mL / NET: -1529 mL    06-06-24 @ 07:01  -  06-06-24 @ 08:46  --------------------------------------------------------  IN: 83 mL / OUT: 0 mL / NET: 83 mL      PHYSICAL EXAM:  General: No acute distress, mildly jaundiced   Lungs: Normal respiratory effort and no intercostal retractions, comfortable on BiPAP  Cardiovascular: RRR  Abdomen: Soft, non-tender, non-distended, +ileostomy, +fistula site covered by ostomy bag   Neurological: Alert and oriented x3  Skin: Warm and dry. No obvious rash      MEDICATIONS:  MEDICATIONS  (STANDING):  allopurinol 300 milliGRAM(s) Oral every 24 hours  atorvastatin 20 milliGRAM(s) Oral at bedtime  carvedilol 6.25 milliGRAM(s) Oral every 12 hours  ceFAZolin   IVPB 2000 milliGRAM(s) IV Intermittent every 12 hours  chlorhexidine 2% Cloths 1 Application(s) Topical <User Schedule>  cholestyramine Powder (Sugar-Free) 4 Gram(s) Oral every 24 hours  dextrose 10% Bolus 125 milliLiter(s) IV Bolus once  dextrose 5%. 1000 milliLiter(s) (100 mL/Hr) IV Continuous <Continuous>  dextrose 5%. 1000 milliLiter(s) (50 mL/Hr) IV Continuous <Continuous>  dextrose 50% Injectable 25 Gram(s) IV Push once  dextrose 50% Injectable 12.5 Gram(s) IV Push once  epoetin matt (EPOGEN) Injectable 69848 Unit(s) IV Push every 7 days  ergocalciferol 41520 Unit(s) Oral <User Schedule>  glucagon  Injectable 1 milliGRAM(s) IntraMuscular once  hydrocortisone Enema 100 milliGRAM(s) Rectal every 12 hours  insulin lispro (ADMELOG) corrective regimen sliding scale   SubCutaneous at bedtime  insulin lispro (ADMELOG) corrective regimen sliding scale   SubCutaneous three times a day before meals  levothyroxine 50 MICROGram(s) Oral every 24 hours  metoclopramide Injectable 10 milliGRAM(s) IV Push once  Omegaven 10grams/100 mL 60 Gram(s) 60 Gram(s) (50 mL/Hr) IV Continuous <Continuous>  ondansetron Injectable 4 milliGRAM(s) IV Push every 6 hours  pancrelipase  (CREON 24,000 Lipase Units) 1 Capsule(s) Oral three times a day with meals  Parenteral Nutrition - Adult 1 Each (83 mL/Hr) TPN Continuous <Continuous>  ursodiol Capsule 300 milliGRAM(s) Oral every 8 hours  venlafaxine XR. 300 milliGRAM(s) Oral every 24 hours    MEDICATIONS  (PRN):  albuterol/ipratropium for Nebulization 3 milliLiter(s) Nebulizer every 6 hours PRN Shortness of Breath and/or Wheezing  dextrose Oral Gel 15 Gram(s) Oral once PRN Blood Glucose LESS THAN 70 milliGRAM(s)/deciliter  LORazepam   Injectable 0.25 milliGRAM(s) IV Push every 24 hours PRN Anxiety  melatonin 5 milliGRAM(s) Oral at bedtime PRN Insomnia      ALLERGIES:  Allergies    penicillin (Angioedema)    Intolerances        LABS:                        7.6    11.87 )-----------( 77       ( 06 Jun 2024 04:13 )             23.2     06-06    139  |  99  |  93<H>  ----------------------------<  157<H>  3.9   |  30  |  2.23<H>    Ca    7.9<L>      06 Jun 2024 04:13  Phos  5.1     06-06  Mg     2.1     06-06    TPro  6.5  /  Alb  1.9<L>  /  TBili  11.4<H>  /  DBili  7.7<H>  /  AST  326<H>  /  ALT  44  /  AlkPhos  82  06-06    PT/INR - ( 05 Jun 2024 05:30 )   PT: 19.3 sec;   INR: 1.72          PTT - ( 05 Jun 2024 05:30 )  PTT:41.6 sec  Urinalysis Basic - ( 06 Jun 2024 04:13 )    Color: x / Appearance: x / SG: x / pH: x  Gluc: 157 mg/dL / Ketone: x  / Bili: x / Urobili: x   Blood: x / Protein: x / Nitrite: x   Leuk Esterase: x / RBC: x / WBC x   Sq Epi: x / Non Sq Epi: x / Bacteria: x      CAPILLARY BLOOD GLUCOSE      POCT Blood Glucose.: 147 mg/dL (06 Jun 2024 05:29)  RADIOLOGY & ADDITIONAL TESTS: Reviewed.

## 2024-06-07 NOTE — CHART NOTE - NSCHARTNOTEFT_GEN_A_CORE
Admitting Diagnosis:   Patient is a 78y old  Male who presents with a chief complaint of Symptomatic Anemia (07 Jun 2024 12:06)      PAST MEDICAL & SURGICAL HISTORY:  Essential hypertension  Hypertension      Atrial fibrillation  s/p cardioversion 2010 and 2014  Pt. reports 4 DCCV  Now on Amiodarone 200mg PO bid and Eliquis 5mg PO bid  Last DCCV 4 yrs ago at [facility]      Crohn's disease  s/p partial resection of ileum      Hyperlipidemia      Hypothyroidism      History of depression  On Venlafaxine ER 150mg PO bid      Junctional rhythm      Bradycardia      H/O knee surgery      History of cataract surgery      S/P appendectomy          Current Nutrition Order:  NPO except chewing gum & hard candy  2L TPN via PICC @83ml/hr x 24hrs- 370g Dextrose, 136g Amino Acids, 60g OMEGAVEN lipids [Tuesday-Sunday]; provides 2451 kcals, 136g protein, GIR 2.59 mg/kg/min    PO Intake: Good (%) [   ]  Fair (50-75%) [   ] Poor (<25%) [   ] - N/A    GI Issues: bleeding from fistula-- 3.75L output x 24hrs; sx team noted ileostomy site with small ooze [stopped with pressure and surgicell], light brown fluid in bag    Pain: no pain/discomfort noted    Skin Integrity: +BLE pitting edema, skin tear L arm; Rudy score 16    I&Os:   06-06-24 @ 07:01  -  06-07-24 @ 07:00  --------------------------------------------------------  IN: 4704 mL / OUT: 4500 mL / NET: 204 mL    06-07-24 @ 07:01  -  06-07-24 @ 12:40  --------------------------------------------------------  IN: 665 mL / OUT: 800 mL / NET: -135 mL        Labs:   06-07    133<L>  |  92<L>  |  102<H>  ----------------------------<  153<H>  3.4<L>   |  29  |  2.70<H>    Ca    7.6<L>      07 Jun 2024 04:52  Phos  5.1     06-07  Mg     2.2     06-07    TPro  6.2  /  Alb  2.0<L>  /  TBili  11.2<H>  /  DBili  8.0<H>  /  AST  310<H>  /  ALT  28  /  AlkPhos  74  06-07    CAPILLARY BLOOD GLUCOSE      POCT Blood Glucose.: 179 mg/dL (07 Jun 2024 12:01)  POCT Blood Glucose.: 154 mg/dL (07 Jun 2024 05:15)  POCT Blood Glucose.: 183 mg/dL (06 Jun 2024 21:47)  POCT Blood Glucose.: 184 mg/dL (06 Jun 2024 15:53)      Medications:  MEDICATIONS  (STANDING):  allopurinol 300 milliGRAM(s) Oral every 24 hours  atorvastatin 20 milliGRAM(s) Oral at bedtime  carvedilol 6.25 milliGRAM(s) Oral every 12 hours  chlorhexidine 2% Cloths 1 Application(s) Topical <User Schedule>  cholestyramine Powder (Sugar-Free) 4 Gram(s) Oral every 24 hours  dextrose 10% Bolus 125 milliLiter(s) IV Bolus once  dextrose 5%. 1000 milliLiter(s) (100 mL/Hr) IV Continuous <Continuous>  dextrose 5%. 1000 milliLiter(s) (50 mL/Hr) IV Continuous <Continuous>  dextrose 50% Injectable 25 Gram(s) IV Push once  dextrose 50% Injectable 12.5 Gram(s) IV Push once  epoetin matt (EPOGEN) Injectable 83099 Unit(s) IV Push every 7 days  ergocalciferol 46089 Unit(s) Oral <User Schedule>  glucagon  Injectable 1 milliGRAM(s) IntraMuscular once  hydrocortisone Enema 100 milliGRAM(s) Rectal every 12 hours  insulin lispro (ADMELOG) corrective regimen sliding scale   SubCutaneous at bedtime  insulin lispro (ADMELOG) corrective regimen sliding scale   SubCutaneous three times a day before meals  OMEGAVEN 10G/100 ML VIAL 600 milliLiter(s) 600 milliLiter(s) (50 mL/Hr) IV Continuous <Continuous>  ondansetron Injectable 4 milliGRAM(s) IV Push every 6 hours  pancrelipase  (CREON 24,000 Lipase Units) 1 Capsule(s) Oral three times a day with meals  Parenteral Nutrition - Adult 1 Each (83 mL/Hr) TPN Continuous <Continuous>  Parenteral Nutrition - Adult 1 Each (83 mL/Hr) TPN Continuous <Continuous>  ursodiol Capsule 300 milliGRAM(s) Oral every 8 hours  venlafaxine XR. 300 milliGRAM(s) Oral every 24 hours    MEDICATIONS  (PRN):  albuterol/ipratropium for Nebulization 3 milliLiter(s) Nebulizer every 6 hours PRN Shortness of Breath and/or Wheezing  dextrose Oral Gel 15 Gram(s) Oral once PRN Blood Glucose LESS THAN 70 milliGRAM(s)/deciliter  LORazepam   Injectable 0.25 milliGRAM(s) IV Push every 24 hours PRN Anxiety  melatonin 5 milliGRAM(s) Oral at bedtime PRN Insomnia      Weight:  5/29 99kg  5/16 89.4kg  5/13 98.7kg  5/10 110.1kg  5/9 98kg  4/26 95.8kg   3/25 95.3kg  2/1 96kg  1/7 94kg  12/11/23 93.3kg  11/27/23 94.4kg  10/20/23 85.2kg  9/29/23 88.3kg  8/27/23 91.8kg    Weight Change: wt fluctuations anticipated in setting of fluid shifts/edema. Continue daily/weekly weights for trending    Estimated energy needs:   Ideal body weight (83.4kg) used for calculations as pt >100% IBW and BMI <30 per [facility] Standards of Care. Needs estimated for age and adjusted for current clinical status, renal function, increased needs for wound healing, high outputs. Fluid needs per team.    Calories: 4783-7173 kcals based on 25-35 kcals/kg  Protein: 100-166.8g based on 1.2-2.0g protein/kg  Fluid needs per team*    Subjective:   78y M with past medical history of AFib/Flutter with numerous DCCVs in the past with prolonged admission from 6/23/23 till 2/12/24 for Crohns SBO s/p open TRE and 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 Prolonged hospital course significant for recurrent AKIs, cholestatic transaminitis s/p perc cony, prior DVTs, PNAs, fungemia, sinus pauses s/p pacemaker, return to [facility] multiple times after discharge in Feb2024 for recurrent bleeding from ileostomy, fistula and wound bed, most recently s/p IR embolization of L branch of inferior epigastric artery via R groin access (4/16), S/p ileoscopy, found an ulcer w/ clot proximal to stoma w/ hemoclip applied (5/1). most recently transferred to SICU 5/8 overnight for 2L blood loss from fistula site with subsequent hypotension. s/p ileoscopy, noting erythematous patch of bowel just deep to the ECF, without any active bleeding (5/9). S/p IR angiography noting several areas of hyperemia in the jejunum adjacent to the stoma however no evidence of active bleeding (5/9), also noted MSSA bacteremia (5/9) likely due to line sepsis/PICC infection, started on Ancef, portal vein thrombosis per US from 5/13/24, but not candidate for anticoagulation due to persistent bleeding. Ultimately transferred from [facility] to [facility] 5/20 for possible surgical intervention for persist bleeding, but deemed not candidate for surgery due to portal hypertension. pt readmitted to [facility] Medicine tele on 5/29 for fistula wound bed bleeding. s/p PRBC transfusion. Pt transferred from [facility] to SICU on 6/1 for persistent fistula wound bed bleeding with acute blood loss anemia for close management. With total body overload, s/p L thoracentesis (6/4)    Chart reviewed. Discussed with interdisciplinary team today during AM rounds. Pt observed sleeping soundly on 5EA, labs & Rx reviewed. Pt NPO due to increase in output/bleeding with increase in PO intake. TPN remains primary means to nutrition as bowel length <120cm without colon in continuity & high output fistula [>500ml daily]. Insufficient bowel to maintain/restore nutrition status through PO diet alone. Micronutrient levels obtained 5/9- selenium 85 <L>, zinc 62 WNL [up from 41 3/12], copper 87 WNL. To continue to provide copper & zinc on MWF schedule, change selenium provision to daily. Recommend obtaining repeat labs monthly. Ongoing elevated total & direct bilirubin, Alk phos WNL,  <H>, ALT WNL-- likely secondary to portal htn. Continue Omegaven 6 days per week & SMOF lipids 1 day per week to prevent essential fatty acid deficiency. Meds notable for EPO, creon, pancrelipase, cholestyramine, ursodiol, 50,000 units vit D weekly [last serum vit D low 5/29]. Recommendations discussed with team. RDN will continue to monitor, reassess, and intervene as appropriate.     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 TPN via PICC over 24hrs- 370g Dextrose, 136g Amino Acids, 60g OMEGAVEN lipids [Tuesday-Sunday]; provides 2451 kcals, 136g protein, GIR 2.59 mg/kg/min --- provides 29.4 kcals/kg, 23.0 non-protein kcals/kg, 1.63g protein/kg IBW; 26.9% kcals from fat  - continue Omegaven Tuesday-Sunday, 50g SMOF lipids Mondays  - monitor renal function, increase amino acid provision to meet needs/account for losses as renal function improves --> goal: 2g/kg IBW  - fluid & lytes per team  - provide selenium in bag daily, zinc & copper MWF --- recheck levels monthly  - consider triene:tetraene test to monitor for EFAD  2. PO diet per MD discretion  - goal: Low fiber diet & 1pk Ottoniel BID [provides 160 kcals, 14g arginine, 14g glutamine, 5g collagen]  3. Weekly lipid panel while on TPN  - hold / reduce lipids if TG >400  - trend hepatic labs, adjust substrates in bag as indicated  4. Recheck serum Vit D, recommend supplementation as able  5. Daily BMP/Mg/Phos, fingersticks Q4-6hrs  - monitor lytes closely & replete outside bag prn  6. Monitor I&Os  7. Consider UUN & fecal fat studies to monitor needs and/or changes in PO absorption  8. Close monitoring of weights, outputs, labs, adjust TPN as indicated to prevent over/underfeeding  9. Align nutrition with GOC at all times    Education: ongoing diet education prn    Risk Level: High [ X  ] Moderate [   ] Low [   ]

## 2024-06-07 NOTE — CONSULT NOTE ADULT - CONSULT REASON
Ostomy bleeding, portal hypertension
Pleural effusion
Symptom Management and Complex Medical Decision Making/GOC in the setting of Critical Illness
B/L Nails
bleeding from wound bed
ELVI
prolonged surgical history with bleeding from fistula

## 2024-06-07 NOTE — CONSULT NOTE ADULT - SUBJECTIVE AND OBJECTIVE BOX
NEPHROLOGY SERVICE INITIAL CONSULT NOTE    Russ Trejo is a 77yo M w/ PMH of CKD3 (recent baseline likely 1.2-1.5 range in setting of multiple prolonged hospitalizations), afib/aflutter, hx of DVT, hx of fungemia, sinus pauses s/p PPM, crohns s/p TRE and small bowel resection with ileostomy c/b dehiscence and enteroatmospheric fistula initially admitted  with recurrent bleeding from ostomy. Note prolonged hospital course requiring upgrade to SICU and blood transfusions for hypotension and continued bleeding, MSSA bacteremia, portal vein thrombosis not on AC due to bleeding, [place] [facility] [place] intervention for bleeding but ultimately deemed not a candidate for surgery due to portal HTN then transferred back to [facility] with persistent bleeding requiring transfusion. Now noted that patient has anasarca and pleural effusion s/p L thoracentesis . Also with elevated bilirubin 11.2. Received IV contrast from CTA on 6/3. sCr also noted to be increasing from mary 1.18 on  to 2.7 today. Noted multiple episodes of hypotension over the past 48-72 hours. UOP downtrending, 1.9L on 6/3 to 1L on , 1L on , now 750cc on . Significant outs noted from ileostomy/fistula with 2L to 4L out recorded daily over the past several days. Receiving TPN with daily volume 1.8-2L. Urine studies with urine sodium <20, granular casts, mild pyuria, no hematuria. Nephrology consulted for further management of ELVI on CKD.    REVIEW OF SYSTEMS:   Otherwise negative except as specified in HPI    PAST MEDICAL AND SURGICAL HISTORY:   PAST MEDICAL & SURGICAL HISTORY:  Essential hypertension  Hypertension      Atrial fibrillation  s/p cardioversion  and   Pt. reports 4 DCCV  Now on Amiodarone 200mg PO bid and Eliquis 5mg PO bid  Last DCCV 4 yrs ago at [facility]      Crohn's disease  s/p partial resection of ileum      Hyperlipidemia      Hypothyroidism      History of depression  On Venlafaxine ER 150mg PO bid      Junctional rhythm      Bradycardia      H/O knee surgery      History of cataract surgery      S/P appendectomy          FAMILY HISTORY:  FAMILY HISTORY:      Otherwise Noncontributory to current admission    SOCIAL HISTORY:  Tobacco use: Denies  EtOH use: Denies  Illicit drug use: Denies    HOME MEDICATIONS:      ACTIVE MEDICATIONS:  MEDICATIONS  (STANDING):  allopurinol 300 milliGRAM(s) Oral every 24 hours  atorvastatin 20 milliGRAM(s) Oral at bedtime  carvedilol 6.25 milliGRAM(s) Oral every 12 hours  chlorhexidine 2% Cloths 1 Application(s) Topical <User Schedule>  cholestyramine Powder (Sugar-Free) 4 Gram(s) Oral every 24 hours  dextrose 10% Bolus 125 milliLiter(s) IV Bolus once  dextrose 5%. 1000 milliLiter(s) (100 mL/Hr) IV Continuous <Continuous>  dextrose 5%. 1000 milliLiter(s) (50 mL/Hr) IV Continuous <Continuous>  dextrose 50% Injectable 25 Gram(s) IV Push once  dextrose 50% Injectable 12.5 Gram(s) IV Push once  epoetin matt (EPOGEN) Injectable 30299 Unit(s) IV Push every 7 days  ergocalciferol 65748 Unit(s) Oral <User Schedule>  glucagon  Injectable 1 milliGRAM(s) IntraMuscular once  hydrocortisone Enema 100 milliGRAM(s) Rectal every 12 hours  insulin lispro (ADMELOG) corrective regimen sliding scale   SubCutaneous three times a day before meals  insulin lispro (ADMELOG) corrective regimen sliding scale   SubCutaneous at bedtime  OMEGAVEN 10G/100 ML VIAL 600 milliLiter(s) 600 milliLiter(s) (50 mL/Hr) IV Continuous <Continuous>  ondansetron Injectable 4 milliGRAM(s) IV Push every 6 hours  pancrelipase  (CREON 24,000 Lipase Units) 1 Capsule(s) Oral three times a day with meals  Parenteral Nutrition - Adult 1 Each (83 mL/Hr) TPN Continuous <Continuous>  Parenteral Nutrition - Adult 1 Each (83 mL/Hr) TPN Continuous <Continuous>  ursodiol Capsule 300 milliGRAM(s) Oral every 8 hours  venlafaxine XR. 300 milliGRAM(s) Oral every 24 hours    MEDICATIONS  (PRN):  albuterol/ipratropium for Nebulization 3 milliLiter(s) Nebulizer every 6 hours PRN Shortness of Breath and/or Wheezing  dextrose Oral Gel 15 Gram(s) Oral once PRN Blood Glucose LESS THAN 70 milliGRAM(s)/deciliter  LORazepam   Injectable 0.25 milliGRAM(s) IV Push every 24 hours PRN Anxiety  melatonin 5 milliGRAM(s) Oral at bedtime PRN Insomnia      ALLERGIES:  Allergies    penicillin (Angioedema)    Intolerances        VITAL SIGNS:  Vital Signs Last 24 Hrs  T(C): 37.1 (2024 12:00), Max: 37.2 (2024 21:54)  T(F): 98.7 (2024 12:00), Max: 99 (2024 21:54)  HR: 61 (2024 16:00) (61 - 104)  BP: 94/46 (2024 16:00) (61/40 - 128/79)  BP(mean): 67 (2024 16:00) (46 - 97)  RR: 16 (2024 16:00) (15 - 26)  SpO2: 100% (2024 16:00) (94% - 100%)    Parameters below as of 2024 16:00  Patient On (Oxygen Delivery Method): nasal cannula  O2 Flow (L/min): 2      24 @ 07:  -  24 @ 07:00  --------------------------------------------------------  IN:    Albumin 5%  - 250 mL: 250 mL    freetext medication - Infusion: 750 mL    IV PiggyBack: 50 mL    IV PiggyBack: 100 mL    Oral Fluid: 1645 mL    TPN (Total Parenteral Nutrition): 1909 mL  Total IN: 4704 mL    OUT:    Drain (mL): 3750 mL    Voided (mL): 750 mL  Total OUT: 4500 mL    Total NET: 204 mL      24 @ 07:01  -  24 @ 17:04  --------------------------------------------------------  IN:    freetext medication - Infusion: 50 mL    IV PiggyBack: 200 mL    TPN (Total Parenteral Nutrition): 830 mL  Total IN: 1080 mL    OUT:    Drain (mL): 1000 mL    Voided (mL): 500 mL  Total OUT: 1500 mL    Total NET: -420 mL          PHYSICAL EXAM:  Constitutional: NAD, sitting in bedside chair  Head: NCAT, EOMI  Respiratory: CTAB, bibasilar crackles  Cardiac: Regular rate, no murmur  Gastrointestinal: abdomen soft, ileostomy and fistula noted  Extremities: WWP, 2+ LE edema  Dermatologic: skin warm, scattered ecchymoses noted  Neurologic: Awake, alert, interactive    LABS:                        7.9    10.00 )-----------( 65       ( 2024 04:52 )             23.0     06-07    133<L>  |  92<L>  |  102<H>  ----------------------------<  153<H>  3.4<L>   |  29  |  2.70<H>    Ca    7.6<L>      2024 04:52  Phos  5.1     06-07  Mg     2.2     06-07    TPro  6.2  /  Alb  2.0<L>  /  TBili  11.2<H>  /  DBili  8.0<H>  /  AST  310<H>  /  ALT  28  /  AlkPhos  74  06-07      Urinalysis Basic - ( 2024 08:52 )    Color: Dark Yellow / Appearance: Turbid / S.018 / pH: x  Gluc: x / Ketone: Negative mg/dL  / Bili: Large / Urobili: 0.2 mg/dL   Blood: x / Protein: 100 mg/dL / Nitrite: Negative   Leuk Esterase: Trace / RBC: 2 /HPF / WBC 10 /HPF   Sq Epi: x / Non Sq Epi: 1 /HPF / Bacteria: Negative /HPF          CAPILLARY BLOOD GLUCOSE      POCT Blood Glucose.: 162 mg/dL (2024 16:24)  POCT Blood Glucose.: 179 mg/dL (2024 12:01)  POCT Blood Glucose.: 154 mg/dL (2024 05:15)  POCT Blood Glucose.: 183 mg/dL (2024 21:47)          RADIOLOGY & ADDITIONAL TESTS: Reviewed.

## 2024-06-07 NOTE — CONSULT NOTE ADULT - ATTENDING COMMENTS
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 He is presently in the SICU with bleeding from his fistula. He's using BiPAP and complains of dyspnea. We performed L thora under US guidance at the bedside today, off positive pressure. No immediate complication. We will follow up the fluid studies.
I agree with the fellow's findings and plans as written above with the following additions/amendments:    Seen and examined at bedside. Reviewed long prior history and hospitalization, speaking with primary outpatient and primary nephrologist. Many possible sources of ELVI including hypovolemia, sepsis, HRS, ATN, and BRENDA. For now would focus on volume repletion with special care to avoid worsening pulmonary edema, use 25% as above. Discussed in detail with primary team, further recs as above

## 2024-06-07 NOTE — PROGRESS NOTE ADULT - ASSESSMENT
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 Prolonged hospital course significant for recurrent AKIs, cholestatic transaminitis s/p perc cony, prior DVTs, PNAs, fungemia, sinus pauses s/p pacemaker, return to [facility] multiple times after discharge in Feb2024 for recurrent bleeding from ileostomy, fistula and wound bed, most recently s/p IR embolization of L branch of inferior epigastric artery via R groin access (4/16), S/p ileoscopy, found an ulcer w/ clot proximal to stoma w/ hemoclip applied (5/1). most recently transferred to SICU 5/8 overnight for 2L blood loss from fistula site with subsequent hypotension. s/p ileoscopy, noting erythematous patch of bowel just deep to the ECF, without any active bleeding (5/9). S/p IR angiography noting several areas of hyperemia in the jejunum adjacent to the stoma however no evidence of active bleeding (5/9), also noted MSSA bacteremia (5/9) likely due to line sepsis/PICC infection, started on Ancef, portal vein thrombosis per US from 5/13/24, but not candidate for anticoagulation due to persistent bleeding. Ultimately transferred from [facility] to [facility] 5/20 for possible surgical intervention for persist bleeding, but deemed not candidate for surgery due to portal hypertension. pt readmitted to [facility] Medicine tele on 5/29 for fistula wound bed bleeding. s/p PRBC transfusion. Pt transferred from [facility] to SICU on 6/1 for persistent fistula wound bed bleeding with acute blood loss anemia for close management. With total body overload, s/p L thoracentesis (6/4).     Recommendations:  - c/w supportive care  - appreciate palliative care recs  - monitor bleeding from ECF and ileostomy, transfuse as necessary  Plan discussed with chief resident and attending, Dr. Peterson

## 2024-06-07 NOTE — CONSULT NOTE ADULT - CONSULT REQUESTED DATE/TIME
01-Jun-2024 11:37
04-Jun-2024 11:27
29-May-2024 01:53
05-Jun-2024 11:39
07-Jun-2024 17:04
29-May-2024 20:11
29-May-2024 10:41

## 2024-06-07 NOTE — PROGRESS NOTE ADULT - ASSESSMENT
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 Prolonged hospital course significant for recurrent AKIs, cholestatic transaminitis s/p perc cony, prior DVTs, PNAs, fungemia, sinus pauses s/p pacemaker, return to [facility] multiple times after discharge in Feb2024 for recurrent bleeding from ileostomy, fistula and wound bed, most recently s/p IR embolization of L branch of inferior epigastric artery via R groin access (4/16), S/p ileoscopy, found an ulcer w/ clot proximal to stoma w/ hemoclip applied (5/1). most recently transferred to SICU 5/8 overnight for 2L blood loss from fistula site with subsequent hypotension. s/p ileoscopy, noting erythematous patch of bowel just deep to the ECF, without any active bleeding (5/9). S/p IR angiography noting several areas of hyperemia in the jejunum adjacent to the stoma however no evidence of active bleeding (5/9), also noted MSSA bacteremia (5/9) likely due to line sepsis/PICC infection, started on Ancef, portal vein thrombosis per US from 5/13/24, but not candidate for anticoagulation due to persistent bleeding. Ultimately transferred from [facility] to [facility] 5/20 for possible surgical intervention for persist bleeding, but deemed not candidate for surgery due to portal hypertension. pt readmitted to [facility] Medicine tele on 5/29 for fistula wound bed bleeding. s/p PRBC transfusion. Pt transferred from [facility] to SICU on 6/1 for persistent fistula wound bed bleeding with acute blood loss anemia for close management. With total body overload, s/p L thoracentesis (6/4).     Neuro: Pain: Dilaudid PRN. Hx: Anxiety: Continue Effexor and Ativan PRN. Ambien on hold. Nausea: Zofran PRN.  CV: maintaining MAPs > 65 without pressors. Hx AFib: cont coreg, no AC given recurrent unprovoked bleed. Hx HLD: cont Atorvastatin. Now s/p albumin 25%. Total body overload. Weekly Epogen.  Pulm: Pleural Effusions b/l, no resp distress on Room air, nightly BIPAP at 40%. POCUS as needed, L thoracentesis (6/4). Bumex 1mg given.   GI: recommended NPO diet, with at most 2 ice chips per hour. persistent elevated T bili s/p Hepatology consults and workup in past, US with portal vein thrombosis 5/13, but unable to anticoagulate due to recurrent bleeding. Cont cholestyramine. C/w Ursodiol. Cont Creon. cont octreotide (in TPN), Crohns: cont hydrocortisone enemas via ileostomy, Continue TPN, smofflipids on Monday, Omegaven on Tues to Sunday.  holding off on remicade in setting of MSSA bacteremia. Dr Zhao consulted on interventional options for portal htn.   : Voids. BUN/Cr uptrending, Renal consult  ID: MSSA Bacteremia MSSA from BCx (5/9), TTE negative for vegetation. 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 // PREVIOUS: Cefepime (5/10), Vanc x1 (5/9)  Endo: mISSc. Hx: Hypothyroidism. Continue synthroid. insulin in TPN, watch fingerstick.   PPx: SCDs, SQH on hold for bleeding   Heme: acute blood loss anemia, likely due to EC fistula area bleed. Received multiple PRBCs, FFP, plt and cryo in recent admission. seen by Leticia in recent admission. s/p 2u prbc yesterday 6/6  Wounds: Wound manager managed by Wound care team.   Lines: double lumen L PICC (5/17--)  PT: OOBTC daily.   Dispo: SICU

## 2024-06-07 NOTE — CONSULT NOTE ADULT - ASSESSMENT
79 yo M w/ recurrent ELVI (sCr 2.7) on CKD in setting of persistent bleeding and output from abdominal fistula with prolonged hospitalization significant for multiple transfusions, hypotension requiring ICU, portal vein thrombosis, MSSA bacteremia. Noted multiple hypotensive episodes over past 48-72 hours. Received IV contrast on 6/3. Last positive blood cultures on 5/9. Noted significant outs 2-4L per day from fistula. On TPN.    Non-oliguric ELVI on CKD2-3 - suspect primarily pre-renal azotemia in setting of hypotension, net negative volume status given degree of outs from fistula. Possibly with a component of BRENDA, septic/hemodynamic ATN. Rebeca low consistent with pre-renal etiology. Noted subnephrotic proteinuria of 2.1g and mild pyuria.  Hyponatremia in setting of ELVI  - Would trial volume expansion as tolerated with close monitoring of volume status - recommend albumin 25% 100cc q8h x3 doses for now and assess response  - PRBCs as needed in setting of active bleed to maintain hgb >7  - Hold antihypertensives, maintain SBP >100 as able for renal perfusion  - Adjust TPN to increase volume to maintain net even volume status for now  - Trend urine sodium, please check BID  - BMP BID  - Check serum osm  - Check CK, serum uric acid  - Check lactate, LDH, haptoglobin  - Renal US  - Supplement potassium  - Avoid nephrotoxic agents  - Strict I&O  - Dose abx to current egfr

## 2024-06-07 NOTE — PROGRESS NOTE ADULT - SUBJECTIVE AND OBJECTIVE BOX
ON:   Bled again at start of shift, adjacent to bowel near 7 o'clock position. Controlled with surgicel and pressure. SBP dropped again to 80s. 2nd unit of packed red was running at the time so it was pressurized, SBP came up to 110s. Repeat CBC 7.9 (6.3). SBP 80 again, given 250 albumin 5%. Acute afib to low 100s, HD stable, mentating well. Briefly alternating between tachy, sinus, and junctional bruce @50bpm (?paced). POCUS A lines R apex, B lines L apex. AM labs drawn early; Hgb stable but K low; K repleted     SUBJECTIVE: Pt seen and examined at bedside this am by ICU team. Patient is lying comfortably in bed with no complaints. Recommended to have NPO diet, having ice chips/water/tea with increased fistula output. Denies pain. Patient denies fever, nausea, vomiting, chest pain, and shortness of breath.      MEDICATIONS  (STANDING):  allopurinol 300 milliGRAM(s) Oral every 24 hours  atorvastatin 20 milliGRAM(s) Oral at bedtime  carvedilol 6.25 milliGRAM(s) Oral every 12 hours  chlorhexidine 2% Cloths 1 Application(s) Topical <User Schedule>  cholestyramine Powder (Sugar-Free) 4 Gram(s) Oral every 24 hours  dextrose 10% Bolus 125 milliLiter(s) IV Bolus once  dextrose 5%. 1000 milliLiter(s) (100 mL/Hr) IV Continuous <Continuous>  dextrose 5%. 1000 milliLiter(s) (50 mL/Hr) IV Continuous <Continuous>  dextrose 50% Injectable 25 Gram(s) IV Push once  dextrose 50% Injectable 12.5 Gram(s) IV Push once  epoetin matt (EPOGEN) Injectable 45674 Unit(s) IV Push every 7 days  ergocalciferol 97235 Unit(s) Oral <User Schedule>  glucagon  Injectable 1 milliGRAM(s) IntraMuscular once  hydrocortisone Enema 100 milliGRAM(s) Rectal every 12 hours  insulin lispro (ADMELOG) corrective regimen sliding scale   SubCutaneous three times a day before meals  insulin lispro (ADMELOG) corrective regimen sliding scale   SubCutaneous at bedtime  OMEGAVEN 10G/100 ML VIAL 600 milliLiter(s) 600 milliLiter(s) (50 mL/Hr) IV Continuous <Continuous>  ondansetron Injectable 4 milliGRAM(s) IV Push every 6 hours  pancrelipase  (CREON 24,000 Lipase Units) 1 Capsule(s) Oral three times a day with meals  Parenteral Nutrition - Adult 1 Each (83 mL/Hr) TPN Continuous <Continuous>  Parenteral Nutrition - Adult 1 Each (83 mL/Hr) TPN Continuous <Continuous>  ursodiol Capsule 300 milliGRAM(s) Oral every 8 hours  venlafaxine XR. 300 milliGRAM(s) Oral every 24 hours    MEDICATIONS  (PRN):  albuterol/ipratropium for Nebulization 3 milliLiter(s) Nebulizer every 6 hours PRN Shortness of Breath and/or Wheezing  dextrose Oral Gel 15 Gram(s) Oral once PRN Blood Glucose LESS THAN 70 milliGRAM(s)/deciliter  LORazepam   Injectable 0.25 milliGRAM(s) IV Push every 24 hours PRN Anxiety  melatonin 5 milliGRAM(s) Oral at bedtime PRN Insomnia      Drips:     ICU Vital Signs Last 24 Hrs  T(C): 37.1 (07 Jun 2024 12:00), Max: 37.2 (06 Jun 2024 21:54)  T(F): 98.7 (07 Jun 2024 12:00), Max: 99 (06 Jun 2024 21:54)  HR: 62 (07 Jun 2024 11:00) (62 - 104)  BP: 93/53 (07 Jun 2024 11:00) (61/40 - 134/63)  BP(mean): 70 (07 Jun 2024 11:00) (46 - 97)  ABP: --  ABP(mean): --  RR: 16 (07 Jun 2024 11:00) (15 - 23)  SpO2: 97% (07 Jun 2024 11:00) (91% - 100%)    O2 Parameters below as of 07 Jun 2024 11:00  Patient On (Oxygen Delivery Method): nasal cannula  O2 Flow (L/min): 2          Physical Exam:  General: NAD  HEENT: NC/AT, EOMI, PERRLA, normal hearing, no oral lesions, neck supple w/o LAD; scleral icterus, jaundice  Pulmonary: comfortable on nasal cannula, clear to auscultation b/l  Cardiovascular: NSR, no murmurs  Abdominal: soft, RUQ stoma involuted without stigmata of bleeding; LUQ fistula with serous drainage no bleeding at this time  Extremities: WWP, pitting edema b/l lower extremity, improved from prior  Neuro: A/O x3, CNs II-XII grossly intact, normal motor/sensation, no focal deficits  Pulses: palpable distal pulses    Lines/tubes/drains:  Poole:	      Vent settings:      I&O's Summary    06 Jun 2024 07:01  -  07 Jun 2024 07:00  --------------------------------------------------------  IN: 4704 mL / OUT: 4500 mL / NET: 204 mL    07 Jun 2024 07:01  -  07 Jun 2024 12:07  --------------------------------------------------------  IN: 665 mL / OUT: 800 mL / NET: -135 mL        LABS:                        7.9    10.00 )-----------( 65       ( 07 Jun 2024 04:52 )             23.0     06-07    133<L>  |  92<L>  |  102<H>  ----------------------------<  153<H>  3.4<L>   |  29  |  2.70<H>    Ca    7.6<L>      07 Jun 2024 04:52  Phos  5.1     06-07  Mg     2.2     06-07    TPro  6.2  /  Alb  2.0<L>  /  TBili  11.2<H>  /  DBili  8.0<H>  /  AST  310<H>  /  ALT  28  /  AlkPhos  74  06-07      Urinalysis Basic - ( 07 Jun 2024 04:52 )    Color: x / Appearance: x / SG: x / pH: x  Gluc: 153 mg/dL / Ketone: x  / Bili: x / Urobili: x   Blood: x / Protein: x / Nitrite: x   Leuk Esterase: x / RBC: x / WBC x   Sq Epi: x / Non Sq Epi: x / Bacteria: x      CAPILLARY BLOOD GLUCOSE      POCT Blood Glucose.: 179 mg/dL (07 Jun 2024 12:01)  POCT Blood Glucose.: 154 mg/dL (07 Jun 2024 05:15)  POCT Blood Glucose.: 183 mg/dL (06 Jun 2024 21:47)  POCT Blood Glucose.: 184 mg/dL (06 Jun 2024 15:53)    LIVER FUNCTIONS - ( 07 Jun 2024 04:52 )  Alb: 2.0 g/dL / Pro: 6.2 g/dL / ALK PHOS: 74 U/L / ALT: 28 U/L / AST: 310 U/L / GGT: x             Cultures:    RADIOLOGY & ADDITIONAL STUDIES:

## 2024-06-07 NOTE — PROGRESS NOTE ADULT - SUBJECTIVE AND OBJECTIVE BOX
INTERVAL HPI/OVERNIGHT EVENTS:  O/N: Bled again at start of shift, adjacent to bowel near 7 o'clock position. Controlled with surgicel and pressure. SBP dropped again to 80s. 2nd unit of packed red was running at the time so it was pressurized, SBP came up to 110s. Repeat CBC 7.9 (6.3). SBP 80 again, given 250 albumin 5%. Acute afib to low 100s, HD stable, mentating well. Briefly alternating between tachy, sinus, and junctional bruce @50bpm (?paced). POCUS A lines R apex, B lines L apex. AM labs drawn early; Hgb stable but K low; K repleted.     SUBJECTIVE:  Patient seen and examined by chief resident and team on AM rounds. Patient reports feeling well this AM. No reported SOB or cp, satting well on BIPAP this morning. Denied any nausea or vomiting. Patient with small ooze from ileostomy site, stopped with pressure and surgicell.     MEDICATIONS  (STANDING):  allopurinol 300 milliGRAM(s) Oral every 24 hours  atorvastatin 20 milliGRAM(s) Oral at bedtime  calcium gluconate IVPB 1 Gram(s) IV Intermittent once  carvedilol 6.25 milliGRAM(s) Oral every 12 hours  chlorhexidine 2% Cloths 1 Application(s) Topical <User Schedule>  cholestyramine Powder (Sugar-Free) 4 Gram(s) Oral every 24 hours  dextrose 10% Bolus 125 milliLiter(s) IV Bolus once  dextrose 5%. 1000 milliLiter(s) (100 mL/Hr) IV Continuous <Continuous>  dextrose 5%. 1000 milliLiter(s) (50 mL/Hr) IV Continuous <Continuous>  dextrose 50% Injectable 25 Gram(s) IV Push once  dextrose 50% Injectable 12.5 Gram(s) IV Push once  epoetin matt (EPOGEN) Injectable 06640 Unit(s) IV Push every 7 days  ergocalciferol 42105 Unit(s) Oral <User Schedule>  glucagon  Injectable 1 milliGRAM(s) IntraMuscular once  hydrocortisone Enema 100 milliGRAM(s) Rectal every 12 hours  insulin lispro (ADMELOG) corrective regimen sliding scale   SubCutaneous three times a day before meals  insulin lispro (ADMELOG) corrective regimen sliding scale   SubCutaneous at bedtime  Omegaven 10 grams/100 mL 60 Gram(s) 60 Gram(s) (50 mL/Hr) IV Continuous <Continuous>  ondansetron Injectable 4 milliGRAM(s) IV Push every 6 hours  pancrelipase  (CREON 24,000 Lipase Units) 1 Capsule(s) Oral three times a day with meals  Parenteral Nutrition - Adult 1 Each (83 mL/Hr) TPN Continuous <Continuous>  Parenteral Nutrition - Adult 1 Each (83 mL/Hr) TPN Continuous <Continuous>  ursodiol Capsule 300 milliGRAM(s) Oral every 8 hours  venlafaxine XR. 300 milliGRAM(s) Oral every 24 hours    MEDICATIONS  (PRN):  albuterol/ipratropium for Nebulization 3 milliLiter(s) Nebulizer every 6 hours PRN Shortness of Breath and/or Wheezing  dextrose Oral Gel 15 Gram(s) Oral once PRN Blood Glucose LESS THAN 70 milliGRAM(s)/deciliter  LORazepam   Injectable 0.25 milliGRAM(s) IV Push every 24 hours PRN Anxiety  melatonin 5 milliGRAM(s) Oral at bedtime PRN Insomnia      Vital Signs Last 24 Hrs  T(C): 36.8 (07 Jun 2024 05:31), Max: 37.2 (06 Jun 2024 21:54)  T(F): 98.2 (07 Jun 2024 05:31), Max: 99 (06 Jun 2024 21:54)  HR: 95 (07 Jun 2024 08:00) (65 - 104)  BP: 86/48 (07 Jun 2024 08:00) (61/40 - 134/63)  BP(mean): 64 (07 Jun 2024 08:00) (46 - 97)  RR: 15 (07 Jun 2024 08:00) (15 - 26)  SpO2: 95% (07 Jun 2024 08:00) (91% - 100%)    Parameters below as of 07 Jun 2024 08:00  Patient On (Oxygen Delivery Method): nasal cannula  O2 Flow (L/min): 2      PHYSICAL EXAM:  Constitutional: A&Ox3. NAD  HEENT: MMM, PEERLA  Respiratory: non labored breathing, no respiratory distress. Appears comfortable on BIPAP.   Cardiovascular: NSR, RRR  Gastrointestinal: soft, NTND abdomen. Ileostomy site with small ooze, stopped with pressure and surgicell. ECF wound bed without stigmata of recent bleed, light brown fluid in bag without blood. Fistula present pink and well perfused, prolapsed.   Extremities: WWP, + BLE pitting edema    I&O's Detail    06 Jun 2024 07:01  -  07 Jun 2024 07:00  --------------------------------------------------------  IN:    Albumin 5%  - 250 mL: 250 mL    freetext medication - Infusion: 750 mL    IV PiggyBack: 50 mL    IV PiggyBack: 100 mL    Oral Fluid: 1645 mL    TPN (Total Parenteral Nutrition): 1909 mL  Total IN: 4704 mL    OUT:    Drain (mL): 3750 mL    Voided (mL): 750 mL  Total OUT: 4500 mL    Total NET: 204 mL      07 Jun 2024 07:01  -  07 Jun 2024 09:19  --------------------------------------------------------  IN:    freetext medication - Infusion: 50 mL    IV PiggyBack: 100 mL  Total IN: 150 mL    OUT:    Voided (mL): 250 mL  Total OUT: 250 mL    Total NET: -100 mL          LABS:                        7.9    10.00 )-----------( 65       ( 07 Jun 2024 04:52 )             23.0     06-07    133<L>  |  92<L>  |  102<H>  ----------------------------<  153<H>  3.4<L>   |  29  |  2.70<H>    Ca    7.6<L>      07 Jun 2024 04:52  Phos  5.1     06-07  Mg     2.2     06-07    TPro  6.2  /  Alb  2.0<L>  /  TBili  11.2<H>  /  DBili  8.0<H>  /  AST  310<H>  /  ALT  28  /  AlkPhos  74  06-07      Urinalysis Basic - ( 07 Jun 2024 04:52 )    Color: x / Appearance: x / SG: x / pH: x  Gluc: 153 mg/dL / Ketone: x  / Bili: x / Urobili: x   Blood: x / Protein: x / Nitrite: x   Leuk Esterase: x / RBC: x / WBC x   Sq Epi: x / Non Sq Epi: x / Bacteria: x        RADIOLOGY & ADDITIONAL STUDIES:

## 2024-06-08 NOTE — PROGRESS NOTE ADULT - ASSESSMENT
A/P  Bled last night about 500 ml (mixed blood/sucus) from the fistula and wound. Ileostomy is not bleeding.  No bleeding now.  Hgb 7.0.  To be transfused this AM.  Goal is to maintain hgb above 7.  Renal: UO adequate but creatinine 3.1.  Lytes ok.  Non oliguric renal insufficiency / failure.  Albumin 2.3.  On TPN  Stable overall.    Follow hgb and transfuse as necessary.  Added IV fluid as needed.

## 2024-06-08 NOTE — PROGRESS NOTE ADULT - SUBJECTIVE AND OBJECTIVE BOX
5 ICU  (covering for Dr. Peterson)  cefazolin  No pressors or drips  On TPN  On BIPAP/CPAP    Tolerating low residue diet.  No pain or discomfort.    Vital Signs Last 24 Hrs  T(C): 36.2 (08 Jun 2024 08:51), Max: 37.1 (07 Jun 2024 12:00)  T(F): 97.2 (08 Jun 2024 08:51), Max: 98.7 (07 Jun 2024 12:00)  HR: 63 (08 Jun 2024 09:00) (59 - 98)  BP: 95/47 (08 Jun 2024 09:00) (88/46 - 120/56)  BP(mean): 68 (08 Jun 2024 09:00) (64 - 81)  RR: 17 (08 Jun 2024 09:00) (14 - 28)  SpO2: 100% (08 Jun 2024 09:00) (92% - 100%)    Parameters below as of 08 Jun 2024 10:00  Patient On (Oxygen Delivery Method): BiPAP/CPAP    abd: ileostomy with little output.  wound manager in place and no bleeding presently.  No change in enterocutaneous fistula   ext: 2+ edematous feet / lower legs       ml/shift, 900 ml/24 hr  fistula  1,750 ml/last 12 hrs, 3,250 ml/24 hrs  ileostomy  50 ml  TPN as before  IV albumin being given periodically                        7.0    10.81 )-----------( 64       ( 08 Jun 2024 04:28 )             20.1   06-08    130<L>  |  90<L>  |  109<H>  ----------------------------<  152<H>  4.0   |  29  |  3.10<H>    Ca    8.2<L>      08 Jun 2024 04:28  Phos  4.8     06-08  Mg     2.5     06-08    TPro  6.3  /  Alb  2.3<L>  /  TBili  11.4<H>  /  DBili  x   /  AST  349<H>  /  ALT  31  /  AlkPhos  74  06-08   3 = Minimally improved - slightly better with little or no clinically meaningful reduction of symptoms.  Represents very little change in basic clinical status, level of care, or functional capacity. 3 = Minimally improved - slightly better with little or no clinically meaningful reduction of symptoms.  Represents very little change in basic clinical status, level of care, or functional capacity. 4 = No change - symptoms remain essentially unchanged 4 = No change - symptoms remain essentially unchanged 3 = Minimally improved - slightly better with little or no clinically meaningful reduction of symptoms.  Represents very little change in basic clinical status, level of care, or functional capacity. 3 = Minimally improved - slightly better with little or no clinically meaningful reduction of symptoms.  Represents very little change in basic clinical status, level of care, or functional capacity. 3 = Minimally improved - slightly better with little or no clinically meaningful reduction of symptoms.  Represents very little change in basic clinical status, level of care, or functional capacity. 3 = Minimally improved - slightly better with little or no clinically meaningful reduction of symptoms.  Represents very little change in basic clinical status, level of care, or functional capacity. 3 = Minimally improved - slightly better with little or no clinically meaningful reduction of symptoms.  Represents very little change in basic clinical status, level of care, or functional capacity. 3 = Minimally improved - slightly better with little or no clinically meaningful reduction of symptoms.  Represents very little change in basic clinical status, level of care, or functional capacity. 4 = No change - symptoms remain essentially unchanged 3 = Minimally improved - slightly better with little or no clinically meaningful reduction of symptoms.  Represents very little change in basic clinical status, level of care, or functional capacity. 4 = No change - symptoms remain essentially unchanged 3 = Minimally improved - slightly better with little or no clinically meaningful reduction of symptoms.  Represents very little change in basic clinical status, level of care, or functional capacity. 3 = Minimally improved - slightly better with little or no clinically meaningful reduction of symptoms.  Represents very little change in basic clinical status, level of care, or functional capacity. 4 = No change - symptoms remain essentially unchanged 3 = Minimally improved - slightly better with little or no clinically meaningful reduction of symptoms.  Represents very little change in basic clinical status, level of care, or functional capacity. 3 = Minimally improved - slightly better with little or no clinically meaningful reduction of symptoms.  Represents very little change in basic clinical status, level of care, or functional capacity. 3 = Minimally improved - slightly better with little or no clinically meaningful reduction of symptoms.  Represents very little change in basic clinical status, level of care, or functional capacity. 3 = Minimally improved - slightly better with little or no clinically meaningful reduction of symptoms.  Represents very little change in basic clinical status, level of care, or functional capacity. 3 = Minimally improved - slightly better with little or no clinically meaningful reduction of symptoms.  Represents very little change in basic clinical status, level of care, or functional capacity.

## 2024-06-08 NOTE — PROGRESS NOTE ADULT - ASSESSMENT
77 yo M w/ recurrent ELVI (sCr 2.7) on CKD in setting of persistent bleeding and output from abdominal fistula with prolonged hospitalization significant for multiple transfusions, hypotension requiring ICU, portal vein thrombosis, MSSA bacteremia. Noted multiple hypotensive episodes over past 48-72 hours. Received IV contrast on 6/3. Last positive blood cultures on 5/9. Noted significant outs 2-4L per day from fistula as well as acute on chronic anemia. On TPN.    Non-oliguric ELVI on CKD2-3 - suspect primarily pre-renal azotemia in setting of hypotension, net negative volume status given degree of outs from fistula and acute on chronic anemia. Possibly with a component of BRENDA, septic/hemodynamic ATN. Also may now be developing hepatorenal physiology.  Noted subnephrotic proteinuria of 1.7g and mild pyuria.  Hyponatremia in setting of ELVI/CKD  - Would continue trial of volume expansion as tolerated with close monitoring of volume status - continue albumin 25% 100cc q8h for today and assess response  - Okay with starting midodrine for portal HTN  - Repeat UA, urine lytes today  - PRBCs as needed in setting of active bleed to maintain hgb >7  - Hold antihypertensives, maintain SBP >100 as able for renal perfusion  - Adjust TPN to increase volume to maintain net even to mild positive volume status for now  - Trend urine sodium, please check BID  - BMP BID  - Repeat haptoglobin as specimen hemolyzed  - Renal US  - Avoid nephrotoxic agents  - Strict I&O  - Dose abx to current egfr  - No indication for RRT at this time. Will continue to follow closely

## 2024-06-08 NOTE — PROGRESS NOTE ADULT - ASSESSMENT
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 Prolonged hospital course significant for recurrent AKIs, cholestatic transaminitis s/p perc cony, prior DVTs, PNAs, fungemia, sinus pauses s/p pacemaker, return to [facility] multiple times after discharge in Feb2024 for recurrent bleeding from ileostomy, fistula and wound bed, most recently s/p IR embolization of L branch of inferior epigastric artery via R groin access (4/16), S/p ileoscopy, found an ulcer w/ clot proximal to stoma w/ hemoclip applied (5/1). most recently transferred to SICU 5/8 overnight for 2L blood loss from fistula site with subsequent hypotension. s/p ileoscopy, noting erythematous patch of bowel just deep to the ECF, without any active bleeding (5/9). S/p IR angiography noting several areas of hyperemia in the jejunum adjacent to the stoma however no evidence of active bleeding (5/9), also noted MSSA bacteremia (5/9) likely due to line sepsis/PICC infection, started on Ancef, portal vein thrombosis per US from 5/13/24, but not candidate for anticoagulation due to persistent bleeding. Ultimately transferred from [facility] to [facility] 5/20 for possible surgical intervention for persist bleeding, but deemed not candidate for surgery due to portal hypertension. pt readmitted to [facility] Medicine tele on 5/29 for fistula wound bed bleeding. s/p PRBC transfusion. Pt transferred [facility] to SICU on 6/1 for persistent fistula wound bed bleeding with acute blood loss anemia for close management. With total body overload, s/p L thoracentesis (6/4).     Neuro: Pain: Dilaudid PRN. Hx: Anxiety: Continue Effexor and Ativan PRN. Ambien on hold. Nausea: Zofran PRN.  CV: maintaining MAPs > 65 without pressors. Hx AFib: cont coreg, no AC given recurrent unprovoked bleed. Hx HLD: cont Atorvastatin. Total body overload - continue 25% albumin with possibility of diuresis today. Weekly Epogen.  Pulm: Pleural Effusions b/l, no resp distress on Room air, nightly BIPAP at 40%. POCUS as needed, L thoracentesis (6/4).   GI: LRD. persistent elevated T bili s/p Hepatology consults and workup in past, US with portal vein thrombosis 5/13, but unable to anticoagulate due to recurrent bleeding. Cont cholestyramine. C/w Ursodiol. Cont Creon. cont octreotide (in TPN), Crohns: cont hydrocortisone enemas via ileostomy, Continue TPN, smofflipids on Monday, Omegaven on Tues to Sunday.  holding off on remicade in setting of MSSA bacteremia. Dr Zhao consulted on interventional options for portal htn.   : Voids. BUN/Cr uptrending, Renal consult - suspecting hepatorenal syndrome. Starting midodrine 5q8 for portal HTN and hepatorenal. Pending repeat UA/lytes for possible diuresis with worsening Cr  ID: Not currently on abx. Prior MSSA Bacteremia from BCx (5/9), TTE negative for vegetation. 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 // PREVIOUS: Cefepime (5/10), Vanc x1 (5/9)  Endo: mISSc. Hx: Hypothyroidism. Continue synthroid. insulin in TPN, watch fingerstick.   PPx: SCDs, SQH on hold for bleeding   Heme: Acute blood loss anemia, likely due to EC fistula area bleed. Received multiple PRBCs, FFP, plt and cryo in recent admission. seen by Leticia in recent admission. s/p 2u prbc 6/6, additional 1 u PRBC today 6/7 for Hb of 7.  Wounds: Wound manager managed by Wound care team.   Lines: double lumen L PICC (5/17--)  PT: OOBTC daily.   Dispo: SICU

## 2024-06-08 NOTE — PROCEDURE NOTE - ADDITIONAL PROCEDURE DETAILS
L femoral vein cordis placed using US guidance and sterile technique for resuscitation in the setting of hemorrhage shock.

## 2024-06-08 NOTE — PROGRESS NOTE ADULT - SUBJECTIVE AND OBJECTIVE BOX
Patient is a 78y Male seen and evaluated at bedside. Overnight, bleeding 300 cc from the fistula nad wound, stopped w/ Surgicel and pressure. Hb 7.0 this AM, getting pRBC. Pt states feeling a little short of breath. Also stared on midodrine 5mg Q8hr for portal HTN.      Meds:    albumin human 25% IVPB 100 every 8 hours  albuterol/ipratropium for Nebulization 3 every 6 hours PRN  allopurinol 300 every 24 hours  atorvastatin 20 at bedtime  carvedilol 6.25 every 12 hours  chlorhexidine 2% Cloths 1 <User Schedule>  cholestyramine Powder (Sugar-Free) 4 every 24 hours  dextrose 10% Bolus 125 once  dextrose 5%. 1000 <Continuous>  dextrose 5%. 1000 <Continuous>  dextrose 50% Injectable 25 once  dextrose 50% Injectable 12.5 once  dextrose Oral Gel 15 once PRN  epoetin matt (EPOGEN) Injectable 40365 every 7 days  ergocalciferol 60415 <User Schedule>  glucagon  Injectable 1 once  hydrocortisone Enema 100 every 12 hours  insulin lispro (ADMELOG) corrective regimen sliding scale  three times a day before meals  insulin lispro (ADMELOG) corrective regimen sliding scale  at bedtime  levothyroxine Injectable 40 <User Schedule>  LORazepam   Injectable 0.25 every 24 hours PRN  melatonin 5 at bedtime PRN  midodrine. 5 every 8 hours  omegaven 10 grams/100 mL 60 Gram(s)  <Continuous>  ondansetron Injectable 4 every 6 hours  pancrelipase  (CREON 24,000 Lipase Units) 1 three times a day with meals  Parenteral Nutrition - Adult 1 <Continuous>  Parenteral Nutrition - Adult 1 <Continuous>  ursodiol Capsule 300 every 8 hours  venlafaxine XR. 300 every 24 hours      T(C): , Max: 36.9 (06-07-24 @ 22:28)  T(F): , Max: 98.4 (06-07-24 @ 22:28)  HR: 68 (06-08-24 @ 12:00)  BP: 100/50 (06-08-24 @ 12:00)  BP(mean): 72 (06-08-24 @ 12:00)  RR: 27 (06-08-24 @ 12:00)  SpO2: 96% (06-08-24 @ 12:00)  Wt(kg): --    06-07 @ 07:01 - 06-08 @ 07:00  --------------------------------------------------------  IN: 4832 mL / OUT: 4200 mL / NET: 632 mL    06-08 @ 07:01 - 06-08 @ 12:57  --------------------------------------------------------  IN: 715 mL / OUT: 175 mL / NET: 540 mL          Review of Systems:  ROS negative except as per HPI      PHYSICAL EXAM:  Constitutional: NAD, laying in bed  Head: NCAT, EOMI  Respiratory: No resp distress, on NC O2  Cardiac: Regular rate, no murmur  Gastrointestinal: abdomen nondistended, ileostomy and fistula noted  Extremities: WWP, 2+ LE edema  Dermatologic: skin warm, scattered ecchymoses noted  Neurologic: Awake, alert, interactive        LABS:                        7.0    10.81 )-----------( 64       ( 08 Jun 2024 04:28 )             20.1     06-08    130<L>  |  90<L>  |  109<H>  ----------------------------<  152<H>  4.0   |  29  |  3.10<H>    Ca    8.2<L>      08 Jun 2024 04:28  Phos  4.8     06-08  Mg     2.5     06-08    TPro  6.3  /  Alb  2.3<L>  /  TBili  11.4<H>  /  DBili  x   /  AST  349<H>  /  ALT  31  /  AlkPhos  74  06-08    Osmolality, Serum: 325 mosm/kg *H* [280 - 301] (06-08 @ 04:28)  Uric Acid: 3.1 mg/dL *L* [3.4 - 8.8] (06-08 @ 04:28)    PT/INR - ( 08 Jun 2024 04:28 )   PT: 16.9 sec;   INR: 1.50          PTT - ( 08 Jun 2024 04:28 )  PTT:39.6 sec  Urinalysis Basic - ( 08 Jun 2024 04:28 )    Color: x / Appearance: x / SG: x / pH: x  Gluc: 152 mg/dL / Ketone: x  / Bili: x / Urobili: x   Blood: x / Protein: x / Nitrite: x   Leuk Esterase: x / RBC: x / WBC x   Sq Epi: x / Non Sq Epi: x / Bacteria: x      Sodium, Random Urine: <20 mmol/L (06-08 @ 03:41)  Creatinine, Random Urine: 71 mg/dL (06-08 @ 03:41)  Protein/Creatinine Ratio Calculation: 1.7 Ratio (06-08 @ 03:41)  Osmolality, Random Urine: 392 mosm/kg (06-08 @ 03:41)  Potassium, Random Urine: 68 mmol/L (06-08 @ 03:41)  Sodium, Random Urine: <20 mmol/L (06-07 @ 08:52)  Creatinine, Random Urine: 66 mg/dL (06-07 @ 08:52)  Protein/Creatinine Ratio Calculation: 2.1 Ratio (06-07 @ 08:52)  Osmolality, Random Urine: 423 mosm/kg (06-07 @ 08:52)  Potassium, Random Urine: 67 mmol/L (06-07 @ 08:52)        RADIOLOGY & ADDITIONAL STUDIES:

## 2024-06-08 NOTE — PROGRESS NOTE ADULT - SUBJECTIVE AND OBJECTIVE BOX
ON: bleeding 300cc, stopped w/ surgicell/pressure, started 25% albumin per renal x 3 doses, ordered labs per renal, Hgb 7      SUBJECTIVE: Pt seen and examined at bedside this am by ICU team. Patient is lying comfortably in bed, sleeping on BiPAP. No acute distress.      MEDICATIONS  (STANDING):  albumin human 25% IVPB 100 milliLiter(s) IV Intermittent every 8 hours  allopurinol 300 milliGRAM(s) Oral every 24 hours  atorvastatin 20 milliGRAM(s) Oral at bedtime  carvedilol 6.25 milliGRAM(s) Oral every 12 hours  chlorhexidine 2% Cloths 1 Application(s) Topical <User Schedule>  cholestyramine Powder (Sugar-Free) 4 Gram(s) Oral every 24 hours  dextrose 10% Bolus 125 milliLiter(s) IV Bolus once  dextrose 5%. 1000 milliLiter(s) (100 mL/Hr) IV Continuous <Continuous>  dextrose 5%. 1000 milliLiter(s) (50 mL/Hr) IV Continuous <Continuous>  dextrose 50% Injectable 25 Gram(s) IV Push once  dextrose 50% Injectable 12.5 Gram(s) IV Push once  epoetin matt (EPOGEN) Injectable 16783 Unit(s) IV Push every 7 days  ergocalciferol 21523 Unit(s) Oral <User Schedule>  glucagon  Injectable 1 milliGRAM(s) IntraMuscular once  hydrocortisone Enema 100 milliGRAM(s) Rectal every 12 hours  insulin lispro (ADMELOG) corrective regimen sliding scale   SubCutaneous three times a day before meals  insulin lispro (ADMELOG) corrective regimen sliding scale   SubCutaneous at bedtime  levothyroxine Injectable 40 MICROGram(s) IV Push <User Schedule>  midodrine. 5 milliGRAM(s) Oral every 8 hours  omegaven 10 grams/100 mL 60 Gram(s)   (50 mL/Hr) IV Continuous <Continuous>  ondansetron Injectable 4 milliGRAM(s) IV Push every 6 hours  pancrelipase  (CREON 24,000 Lipase Units) 1 Capsule(s) Oral three times a day with meals  Parenteral Nutrition - Adult 1 Each (104 mL/Hr) TPN Continuous <Continuous>  Parenteral Nutrition - Adult 1 Each (83 mL/Hr) TPN Continuous <Continuous>  ursodiol Capsule 300 milliGRAM(s) Oral every 8 hours  venlafaxine XR. 300 milliGRAM(s) Oral every 24 hours    MEDICATIONS  (PRN):  albuterol/ipratropium for Nebulization 3 milliLiter(s) Nebulizer every 6 hours PRN Shortness of Breath and/or Wheezing  dextrose Oral Gel 15 Gram(s) Oral once PRN Blood Glucose LESS THAN 70 milliGRAM(s)/deciliter  LORazepam   Injectable 0.25 milliGRAM(s) IV Push every 24 hours PRN Anxiety  melatonin 5 milliGRAM(s) Oral at bedtime PRN Insomnia      Drips: TPN    ICU Vital Signs Last 24 Hrs  T(C): 36 (08 Jun 2024 13:00), Max: 36.9 (07 Jun 2024 22:28)  T(F): 96.8 (08 Jun 2024 13:00), Max: 98.4 (07 Jun 2024 22:28)  HR: 64 (08 Jun 2024 14:00) (59 - 98)  BP: 94/49 (08 Jun 2024 14:00) (88/47 - 120/56)  BP(mean): 69 (08 Jun 2024 14:00) (63 - 81)  RR: 17 (08 Jun 2024 14:00) (14 - 28)  SpO2: 100% (08 Jun 2024 14:00) (92% - 100%)    O2 Parameters below as of 08 Jun 2024 15:00  Patient On (Oxygen Delivery Method): BiPAP/CPAP            Physical Exam:  General: NAD  HEENT: NC/AT, EOMI, PERRLA, normal hearing, no oral lesions, neck supple w/o LAD; scleral icterus, jaundice  Pulmonary: comfortable on nasal cannula, clear to auscultation b/l  Cardiovascular: NSR, no murmurs  Abdominal: soft, RUQ stoma involuted without stigmata of bleeding; LUQ fistula with serous drainage no bleeding at this time  Extremities: WWP, pitting edema b/l lower extremity, improved from prior  Neuro: A/O x3, CNs II-XII grossly intact, normal motor/sensation, no focal deficits  Pulses: palpable distal pulses      I&O's Summary    07 Jun 2024 07:01  -  08 Jun 2024 07:00  --------------------------------------------------------  IN: 4832 mL / OUT: 4200 mL / NET: 632 mL    08 Jun 2024 07:01  -  08 Jun 2024 14:40  --------------------------------------------------------  IN: 715 mL / OUT: 175 mL / NET: 540 mL        LABS:                        7.0    10.81 )-----------( 64       ( 08 Jun 2024 04:28 )             20.1     06-08    130<L>  |  90<L>  |  109<H>  ----------------------------<  152<H>  4.0   |  29  |  3.10<H>    Ca    8.2<L>      08 Jun 2024 04:28  Phos  4.8     06-08  Mg     2.5     06-08    TPro  6.3  /  Alb  2.3<L>  /  TBili  11.4<H>  /  DBili  x   /  AST  349<H>  /  ALT  31  /  AlkPhos  74  06-08    PT/INR - ( 08 Jun 2024 04:28 )   PT: 16.9 sec;   INR: 1.50          PTT - ( 08 Jun 2024 04:28 )  PTT:39.6 sec  Urinalysis Basic - ( 08 Jun 2024 04:28 )    Color: x / Appearance: x / SG: x / pH: x  Gluc: 152 mg/dL / Ketone: x  / Bili: x / Urobili: x   Blood: x / Protein: x / Nitrite: x   Leuk Esterase: x / RBC: x / WBC x   Sq Epi: x / Non Sq Epi: x / Bacteria: x      CAPILLARY BLOOD GLUCOSE      POCT Blood Glucose.: 188 mg/dL (08 Jun 2024 10:21)  POCT Blood Glucose.: 181 mg/dL (08 Jun 2024 07:40)  POCT Blood Glucose.: 154 mg/dL (08 Jun 2024 05:19)  POCT Blood Glucose.: 175 mg/dL (07 Jun 2024 21:32)  POCT Blood Glucose.: 162 mg/dL (07 Jun 2024 16:24)    LIVER FUNCTIONS - ( 08 Jun 2024 04:28 )  Alb: 2.3 g/dL / Pro: 6.3 g/dL / ALK PHOS: 74 U/L / ALT: 31 U/L / AST: 349 U/L / GGT: x             Cultures:    RADIOLOGY & ADDITIONAL STUDIES:

## 2024-06-08 NOTE — PROCEDURE NOTE - NSPROCDETAILS_GEN_ALL_CORE
location identified, draped/prepped, sterile technique used, needle inserted/introduced/catheter inserted over needle/connection to syringe/ultrasound assessment of effusion (localization)
guidewire recovered/lumen(s) aspirated and flushed/sterile dressing applied/sterile technique, catheter placed/ultrasound guidance with use of sterile gel and probe cove

## 2024-06-08 NOTE — PROGRESS NOTE ADULT - ATTENDING COMMENTS
I agree with the fellow's findings and plans as written above with the following additions/amendments:    Seen and examined at bedside. Askig for water, discussed should be NPO given continued GI bleed, Continue albumin, midodrine. No acute indication for KRT at this time, monitoring closely. Further recs as above, discussed in detail with primary team    Creatinine: 3.10 mg/dL (06-08-24 @ 04:28)  Creatinine: 2.70 mg/dL (06-07-24 @ 04:52)  Creatinine: 2.23 mg/dL (06-06-24 @ 04:13)  Creatinine: 1.93 mg/dL (06-05-24 @ 05:30)  Creatinine: 1.67 mg/dL (06-04-24 @ 03:05)  Creatinine: 1.50 mg/dL (06-03-24 @ 05:12)  Creatinine: 1.57 mg/dL (06-02-24 @ 05:12)  Creatinine: 1.44 mg/dL (06-01-24 @ 06:52)  Creatinine: 1.37 mg/dL (05-30-24 @ 05:30)  Creatinine: 1.18 mg/dL (05-29-24 @ 05:30)

## 2024-06-09 NOTE — PROGRESS NOTE ADULT - SUBJECTIVE AND OBJECTIVE BOX
INTERVAL/OVERNIGHT EVENTS: FeUrea 24.2%. Renal recs continue 25% albumin and 1u PRBC. Hemorrhage from fistula - EBL likely > 1L. Controlled with pressure, surgicell, fibirillar. R femoral cordis placed. Given additional 2uPRBC, 1 FFP, 1 plt, 1 cryo. 20 lasix after products given. Cystatin C 5.01 with eGFR of 9.    SUBJECTIVE: Pt seen at bedside this am, on BiPAP. Denies headache, dizziness, nausea, chest pain, shortness of breath, abdominal pain, extremity pain.       Neurologic Medications  LORazepam   Injectable 0.5 milliGRAM(s) IV Push every 8 hours PRN Anxiety  melatonin 5 milliGRAM(s) Oral at bedtime PRN Insomnia  ondansetron Injectable 4 milliGRAM(s) IV Push every 6 hours  venlafaxine XR. 300 milliGRAM(s) Oral every 24 hours    Respiratory Medications  albuterol/ipratropium for Nebulization 3 milliLiter(s) Nebulizer every 6 hours PRN Shortness of Breath and/or Wheezing    Cardiovascular Medications  carvedilol 6.25 milliGRAM(s) Oral every 12 hours  midodrine. 5 milliGRAM(s) Oral every 8 hours    Gastrointestinal Medications  dextrose 10% Bolus 125 milliLiter(s) IV Bolus once  dextrose 5%. 1000 milliLiter(s) IV Continuous <Continuous>  dextrose 5%. 1000 milliLiter(s) IV Continuous <Continuous>  ergocalciferol 66819 Unit(s) Oral <User Schedule>  pancrelipase  (CREON 24,000 Lipase Units) 1 Capsule(s) Oral three times a day with meals  Parenteral Nutrition - Adult 1 Each TPN Continuous <Continuous>  Parenteral Nutrition - Adult 1 Each TPN Continuous <Continuous>  ursodiol Capsule 300 milliGRAM(s) Oral every 8 hours    Hematologic/Oncologic Medications  epoetin matt (EPOGEN) Injectable 14462 Unit(s) IV Push every 7 days    Endocrine/Metabolic Medications  allopurinol 300 milliGRAM(s) Oral every 24 hours  atorvastatin 20 milliGRAM(s) Oral at bedtime  cholestyramine Powder (Sugar-Free) 4 Gram(s) Oral every 24 hours  dextrose 50% Injectable 25 Gram(s) IV Push once  dextrose 50% Injectable 12.5 Gram(s) IV Push once  dextrose Oral Gel 15 Gram(s) Oral once PRN Blood Glucose LESS THAN 70 milliGRAM(s)/deciliter  glucagon  Injectable 1 milliGRAM(s) IntraMuscular once  hydrocortisone Enema 100 milliGRAM(s) Rectal every 12 hours  insulin lispro (ADMELOG) corrective regimen sliding scale   SubCutaneous three times a day before meals  insulin lispro (ADMELOG) corrective regimen sliding scale   SubCutaneous at bedtime  levothyroxine Injectable 40 MICROGram(s) IV Push <User Schedule>    Topical/Other Medications  chlorhexidine 2% Cloths 1 Application(s) Topical <User Schedule>  omegaven 10 grams/100 mL 60 Gram(s) 60 Gram(s) IV Continuous <Continuous>  omegaven 10 grams/100 mL 60 Gram(s) 60 Gram(s) IV Continuous <Continuous>      MEDICATIONS  (PRN):  albuterol/ipratropium for Nebulization 3 milliLiter(s) Nebulizer every 6 hours PRN Shortness of Breath and/or Wheezing  dextrose Oral Gel 15 Gram(s) Oral once PRN Blood Glucose LESS THAN 70 milliGRAM(s)/deciliter  LORazepam   Injectable 0.5 milliGRAM(s) IV Push every 8 hours PRN Anxiety  melatonin 5 milliGRAM(s) Oral at bedtime PRN Insomnia      I&O's Detail    08 Jun 2024 07:01  -  09 Jun 2024 07:00  --------------------------------------------------------  IN:    Albumin 25%  -  50 mL: 300 mL    Cryoprecipitate: 100 mL    freetext medication - Infusion: 500 mL    Plasma: 300 mL    Platelets - Single Donor: 300 mL    PRBCs (Packed Red Blood Cells): 1200 mL    TPN (Total Parenteral Nutrition): 1779 mL  Total IN: 4479 mL    OUT:    Blood Loss (mL): 1750 mL    Drain (mL): 450 mL    Ileostomy (mL): 300 mL    Voided (mL): 650 mL  Total OUT: 3150 mL    Total NET: 1329 mL      09 Jun 2024 07:01  -  09 Jun 2024 15:27  --------------------------------------------------------  IN:    freetext medication - Infusion: 50 mL    TPN (Total Parenteral Nutrition): 500 mL  Total IN: 550 mL    OUT:    Drain (mL): 200 mL    Voided (mL): 100 mL  Total OUT: 300 mL    Total NET: 250 mL          Vital Signs Last 24 Hrs  T(C): 36.4 (09 Jun 2024 12:00), Max: 36.4 (08 Jun 2024 18:08)  T(F): 97.5 (09 Jun 2024 12:00), Max: 97.5 (08 Jun 2024 18:08)  HR: 63 (09 Jun 2024 14:00) (62 - 91)  BP: 104/53 (09 Jun 2024 14:00) (88/48 - 119/59)  BP(mean): 76 (09 Jun 2024 14:00) (65 - 90)  RR: 16 (09 Jun 2024 14:00) (13 - 22)  SpO2: 99% (09 Jun 2024 14:00) (96% - 100%)    Parameters below as of 09 Jun 2024 14:00  Patient On (Oxygen Delivery Method): BiPAP/CPAP        General: NAD  HEENT: NC/AT, EOMI, PERRLA, normal hearing,  Pulmonary: comfortable on bipap, clear to auscultation b/l  Cardiovascular: NSR, no murmurs  Abdominal: soft, RUQ stoma involuted without stigmata of bleeding; LUQ fistula with serous drainage no bleeding at this time. R fem Cordis in place site c/d/i  Extremities: WWP, pitting edema b/l lower extremity  Neuro: A/O x3, CNs II-XII grossly intact, normal motor/sensation, no focal deficits  Pulses: palpable distal pulses    LABS:                        8.3    11.84 )-----------( 68       ( 09 Jun 2024 05:03 )             24.2     06-09    135  |  94<L>  |  132<H>  ----------------------------<  190<H>  3.8   |  26  |  3.50<H>    Ca    8.1<L>      09 Jun 2024 05:03  Phos  5.5     06-09  Mg     2.5     06-09    TPro  5.9<L>  /  Alb  2.9<L>  /  TBili  12.3<H>  /  DBili  See Note  /  AST  312<H>  /  ALT  30  /  AlkPhos  63  06-09    PT/INR - ( 09 Jun 2024 05:03 )   PT: 15.9 sec;   INR: 1.41          PTT - ( 09 Jun 2024 05:03 )  PTT:38.7 sec  Urinalysis Basic - ( 09 Jun 2024 05:03 )    Color: x / Appearance: x / SG: x / pH: x  Gluc: 190 mg/dL / Ketone: x  / Bili: x / Urobili: x   Blood: x / Protein: x / Nitrite: x   Leuk Esterase: x / RBC: x / WBC x   Sq Epi: x / Non Sq Epi: x / Bacteria: x        RADIOLOGY & ADDITIONAL STUDIES:

## 2024-06-09 NOTE — CHART NOTE - NSCHARTNOTEFT_GEN_A_CORE
Notified by Primary team of interval events. Spoke with patient's son and wife regarding primary concerns, possible disease trajectories, and next steps. Family recognizes that patient remains in an overall decline and are more concerned with symptom burden and protecting the patient from aggressive interventions that will not provide meaningful benefit. Wife and son are in agreement that CPR/Intubation would only be prolonging suffering but they have not heard the patient verbalize this. Per primary team, patient is still able to participate in medical decision making so will plan to have thorough conversation with the patient tomorrow regarding code status. If patient were acutely decompensating and no longer with decisional capacity, wife as HCP will opt for DNR/DNI status acting in patient's best interest. Regardless, the medical team do not have legal/ethical obligation to apply any intervention (including CPR) that carries a higher risk than benefit. In this case, CPR will absolutely prolong the patient's suffering and will not provide any meaningful benefit. Family is also concerned about adequate symptom management, discussed the use of opiates and anxiolytics for management of pain, dyspnea, and anxiety/agitation. They are amenable to appropriate symptom management with parenteral medications as needed.    -ordered Dilaudid 0.5mg IV q4h PRN for Severe Pain or RR>25  -family meeting planned for tomorrow morning around 10am with wife/son and then will sit down with the patient to discuss code status, GOC, etc  -family would be in agreement with DNR/DNI and ensuring adequate symptom management, will discuss with patient in the morning and see if MOLST can be finalized with his consent    Andrea Harper, Palliative Care Attending  Questions/Concerns: Call [phone] (including Nights/Weekend) or message on Microsoft Teams (Andrea Harper Notified by Primary team of interval events. Spoke with patient's son and wife regarding their concerns, possible disease trajectories, and next steps. Family recognizes that patient remains in an overall decline and are more concerned with symptom burden and protecting the patient from aggressive interventions that will not provide meaningful benefit. Wife and son are in agreement that CPR/Intubation would only be prolonging suffering but they have not heard the patient verbalize this. Per primary team, patient is still able to participate in medical decision making so will plan to have thorough conversation with the patient tomorrow regarding code status. If patient were acutely decompensating and no longer with decisional capacity, wife as HCP will opt for DNR/DNI status acting in patient's best interest. Regardless, the medical team do not have legal/ethical obligation to apply any intervention (including CPR) that carries a higher risk than benefit. In this case, CPR will absolutely prolong the patient's suffering and will not provide any meaningful benefit. Family is also concerned about adequate symptom management, discussed the use of opiates and anxiolytics for management of pain, dyspnea, and anxiety/agitation. They are amenable to appropriate symptom management with parenteral medications as needed.    -ordered Dilaudid 0.5mg IV q4h PRN for Severe Pain or RR>25  -family meeting planned for tomorrow morning around 10am with wife/son and then will sit down with the patient to discuss code status, GOC, etc  -family would be in agreement with DNR/DNI and ensuring adequate symptom management, will discuss with patient in the morning and see if MOLST can be finalized with his consent    Andrea Harper, Palliative Care Attending  Questions/Concerns: Call [phone] (including Nights/Weekend) or message on Microsoft Teams (Andrea Harper Notified by Primary team of interval events. Spoke with patient's son and wife regarding their concerns, possible disease trajectories, and next steps. Family recognizes that patient remains in an overall decline and are more concerned with symptom burden and protecting the patient from aggressive interventions that will not provide meaningful benefit. Wife and son are in agreement that CPR/Intubation would only be prolonging suffering but they have not discussed with the patient. Per primary team, patient is still able to participate in medical decision making so will plan to have thorough conversation with the patient tomorrow regarding code status. If patient were acutely decompensating and no longer with decisional capacity, wife as HCP will opt for DNR/DNI status acting in patient's best interest. Regardless, the medical team do not have legal/ethical obligation to apply any intervention (including CPR) that carries a higher risk than benefit. In this case, CPR will absolutely prolong the patient's suffering and will not provide any meaningful benefit. Family is also concerned about adequate symptom management, discussed the use of opiates and anxiolytics for management of pain, dyspnea, and anxiety/agitation. They are amenable to appropriate symptom management with parenteral medications as needed.    -ordered Dilaudid 0.5mg IV q4h PRN for Severe Pain or RR>25  -family meeting planned for tomorrow morning around 10am with wife/son and then will sit down with the patient to discuss code status, GOC, etc  -family would be in agreement with DNR/DNI and ensuring adequate symptom management, will discuss with patient in the morning and see if MOLST can be finalized with his consent    Andrea Harper, Palliative Care Attending  Questions/Concerns: Call [phone] (including Nights/Weekend) or message on Microsoft Teams (Andrea Harper Notified by Primary team of interval events. Spoke with patient's son and wife regarding their concerns, possible disease trajectories, and next steps. Family recognizes that patient remains in an overall decline and are more concerned with symptom burden and protecting the patient from aggressive interventions that will not provide meaningful benefit. Wife and son are in agreement that CPR/Intubation would only be prolonging suffering but they have not discussed with the patient. Per primary team, patient is still able to participate in medical decision making so will plan to have thorough conversation with the patient tomorrow regarding code status. If patient were acutely decompensating and no longer with decisional capacity, wife as HCP will opt for DNR/DNI status acting in patient's best interest. In this case, CPR/intubation will prolong the patient's suffering and will not provide any meaningful benefit, the same can likely be said about RRT/HD. Additionally, family is concerned about adequate symptom management; discussed the use of opiates and anxiolytics for management of pain, dyspnea, and anxiety/agitation. They are amenable to appropriate symptom management with parenteral medications as needed, conservative/judicious PRNs will not affect mortality.    -ordered Dilaudid 0.5mg IV q4h PRN for Severe Pain or RR>25  -family meeting planned for tomorrow morning around 10am with wife/son and then will sit down with the patient to discuss code status, GOC, etc  -family would be in agreement with DNR/DNI and ensuring adequate symptom management, will discuss with patient in the morning and see if MOLST can be finalized with his consent    Andrea Harper, Palliative Care Attending  Questions/Concerns: Call [phone] (including Nights/Weekend) or message on Microsoft Teams (Andrea Harper

## 2024-06-09 NOTE — PROGRESS NOTE ADULT - ASSESSMENT
77 yo M w/ recurrent ELVI on CKD in setting of persistent bleeding and output from abdominal fistula with prolonged hospitalization significant for multiple transfusions, hypotension requiring ICU, portal vein thrombosis, MSSA bacteremia. Noted multiple hypotensive episodes over past 48-72 hours. Received IV contrast on 6/3. Last positive blood cultures on 5/9. Noted significant bleeding from fistula 1.7L/last 24 hours. 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 Possibly with a component of BRENDA, septic/hemodynamic ATN. Also may now be developing hepatorenal physiology.  Noted subnephrotic proteinuria of 1.7g and mild pyuria. New UA 6/8 noted, persistent renal hypoperfusion given low Rebeca  Hyponatremia in setting of ELVI/CKD  - Can hold further albumin. Management of renal injury involves preventing further renal hypoperfusion but at the time difficult to do i/s/o significant output and bleeding from abd fistula  - Okay with midodrine for portal HTN  - PRBCs as needed in setting of active bleed to maintain hgb >7  - Hold antihypertensives, maintain SBP >100 as able for renal perfusion  - Adjust TPN to increase volume to maintain net even to mild positive volume status for now  - Trend urine sodium, please check BID  - BMP BID  - Renal US  - Avoid nephrotoxic agents  - Strict I&O  - Dose abx to current egfr  - No indication for RRT at this time. Will continue to follow closely. GOC also ongoing

## 2024-06-09 NOTE — PROGRESS NOTE ADULT - ASSESSMENT
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 Prolonged hospital course significant for recurrent AKIs, cholestatic transaminitis s/p perc cony, prior DVTs, PNAs, fungemia, sinus pauses s/p pacemaker, return to [facility] multiple times after discharge in Feb2024 for recurrent bleeding from ileostomy, fistula and wound bed, most recently s/p IR embolization of L branch of inferior epigastric artery via R groin access (4/16), S/p ileoscopy, found an ulcer w/ clot proximal to stoma w/ hemoclip applied (5/1). most recently transferred to SICU 5/8 overnight for 2L blood loss from fistula site with subsequent hypotension. s/p ileoscopy, noting erythematous patch of bowel just deep to the ECF, without any active bleeding (5/9). S/p IR angiography noting several areas of hyperemia in the jejunum adjacent to the stoma however no evidence of active bleeding (5/9), also noted MSSA bacteremia (5/9) likely due to line sepsis/PICC infection, started on Ancef, portal vein thrombosis per US from 5/13/24, but not candidate for anticoagulation due to persistent bleeding. Ultimately transferred from [facility] to [facility] 5/20 for possible surgical intervention for persist bleeding, but deemed not candidate for surgery due to portal hypertension. pt readmitted to [facility] Medicine tele on 5/29 for fistula wound bed bleeding. s/p PRBC transfusion. Pt transferred from [facility] to SICU on 6/1 for persistent fistula wound bed bleeding with acute blood loss anemia for close management. With total body overload, s/p L thoracentesis (6/4).     Neuro: Pain: Dilaudid PRN. Hx: Anxiety: Continue Effexor and Ativan PRN. Ambien on hold. Nausea: Zofran PRN.  CV: maintaining MAPs > 65 without pressors. Hx AFib: cont coreg, no AC given recurrent unprovoked bleed. Hx HLD: cont Atorvastatin. Total body overload - continue 25% albumin. Weekly Epogen.  Pulm: Pleural Effusions b/l, no resp distress on Room air, nightly BIPAP at 40%. POCUS as needed, L thoracentesis (6/4).   GI: LRD. persistent elevated T bili s/p Hepatology consults and workup in past, US with portal vein thrombosis 5/13, but unable to anticoagulate due to recurrent bleeding. Cont cholestyramine. C/w Ursodiol. Cont Creon. cont octreotide (in TPN), Crohns: cont hydrocortisone enemas via ileostomy, Continue TPN, smofflipids on Monday, Omegaven on Tues to Sunday.  holding off on remicade in setting of MSSA bacteremia. Dr Zhao consulted on interventional options for portal htn.   : Voids. BUN/Cr uptrending, Renal consult - suspecting hepatorenal syndrome. Starting midodrine 5q8 for portal HTN and hepatorenal. Pending repeat UA/lytes for possible diuresis with worsening Cr  ID: Not currently on abx. Prior MSSA Bacteremia from BCx (5/9), TTE negative for vegetation. 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 // PREVIOUS: Cefepime (5/10), Vanc x1 (5/9)  Endo: mISSc. Hx: Hypothyroidism. Continue synthroid. insulin in TPN, watch fingerstick.   PPx: SCDs, SQH on hold for bleeding   Heme: Acute blood loss anemia, likely due to EC fistula area bleed. Received multiple PRBCs, FFP, plt and cryo in recent admission. seen by Leticia in recent admission. 6/9 EBL 1L from fistula, given 3u prbc, 1 FFP, 1 platlet, 1 cryo.  Wounds: Wound manager managed by Wound care team.   Lines: R fem cordis (6/9-), double lumen L PICC (5/17--)  PT: OOBTC daily.   Dispo: SICU

## 2024-06-09 NOTE — PROGRESS NOTE ADULT - SUBJECTIVE AND OBJECTIVE BOX
Patient is a 78y Male seen and evaluated at bedside. Overnight events noted. Pt with significant bleeding 1.7L from the fistula. Got total of 3 uPRBC yesterday. Albumin was continued.       Meds:    albumin human 25% IVPB 100 every 8 hours  albuterol/ipratropium for Nebulization 3 every 6 hours PRN  allopurinol 300 every 24 hours  atorvastatin 20 at bedtime  carvedilol 6.25 every 12 hours  chlorhexidine 2% Cloths 1 <User Schedule>  cholestyramine Powder (Sugar-Free) 4 every 24 hours  dextrose 10% Bolus 125 once  dextrose 5%. 1000 <Continuous>  dextrose 5%. 1000 <Continuous>  dextrose 50% Injectable 25 once  dextrose 50% Injectable 12.5 once  dextrose Oral Gel 15 once PRN  epoetin matt (EPOGEN) Injectable 54921 every 7 days  ergocalciferol 16004 <User Schedule>  glucagon  Injectable 1 once  hydrocortisone Enema 100 every 12 hours  insulin lispro (ADMELOG) corrective regimen sliding scale  three times a day before meals  insulin lispro (ADMELOG) corrective regimen sliding scale  at bedtime  levothyroxine Injectable 40 <User Schedule>  LORazepam   Injectable 0.5 every 8 hours PRN  melatonin 5 at bedtime PRN  midodrine. 5 every 8 hours  omegaven 10 grams/100 mL 60 Gram(s) 60 <Continuous>  ondansetron Injectable 4 every 6 hours  pancrelipase  (CREON 24,000 Lipase Units) 1 three times a day with meals  Parenteral Nutrition - Adult 1 <Continuous>  Parenteral Nutrition - Adult 1 <Continuous>  ursodiol Capsule 300 every 8 hours  venlafaxine XR. 300 every 24 hours      T(C): , Max: 36.4 (06-08-24 @ 18:08)  T(F): , Max: 97.5 (06-08-24 @ 18:08)  HR: 65 (06-09-24 @ 11:00)  BP: 99/52 (06-09-24 @ 11:00)  BP(mean): 73 (06-09-24 @ 11:00)  RR: 17 (06-09-24 @ 11:00)  SpO2: 96% (06-09-24 @ 11:00)  Wt(kg): --    06-08 @ 07:01  -  06-09 @ 07:00  --------------------------------------------------------  IN: 4479 mL / OUT: 3150 mL / NET: 1329 mL    06-09 @ 07:01  -  06-09 @ 12:34  --------------------------------------------------------  IN: 550 mL / OUT: 300 mL / NET: 250 mL          Review of Systems:  ROS negative except as per HPI      PHYSICAL EXAM:  Constitutional: NAD, Lethargic  Head: NCAT, EOMI  Respiratory: No resp distress, on BiPAP  Cardiac: Regular rate, no murmur  Gastrointestinal: abdomen nondistended, ileostomy and fistula noted  Extremities: WWP, 2+ LE edema b/l  Dermatologic: skin warm, scattered ecchymoses noted  Neurologic: Lethargic, not interacting        LABS:                        8.3    11.84 )-----------( 68       ( 09 Jun 2024 05:03 )             24.2     06-09    135  |  94<L>  |  132<H>  ----------------------------<  190<H>  3.8   |  26  |  3.50<H>    Ca    8.1<L>      09 Jun 2024 05:03  Phos  5.5     06-09  Mg     2.5     06-09    TPro  5.9<L>  /  Alb  2.9<L>  /  TBili  12.3<H>  /  DBili  See Note  /  AST  312<H>  /  ALT  30  /  AlkPhos  63  06-09      PT/INR - ( 09 Jun 2024 05:03 )   PT: 15.9 sec;   INR: 1.41          PTT - ( 09 Jun 2024 05:03 )  PTT:38.7 sec  Urinalysis Basic - ( 09 Jun 2024 05:03 )    Color: x / Appearance: x / SG: x / pH: x  Gluc: 190 mg/dL / Ketone: x  / Bili: x / Urobili: x   Blood: x / Protein: x / Nitrite: x   Leuk Esterase: x / RBC: x / WBC x   Sq Epi: x / Non Sq Epi: x / Bacteria: x      Sodium, Random Urine: 20 mmol/L (06-08 @ 12:05)  Creatinine, Random Urine: 70 mg/dL (06-08 @ 12:05)  Protein/Creatinine Ratio Calculation: 2.2 Ratio (06-08 @ 12:05)  Osmolality, Random Urine: 398 mosm/kg (06-08 @ 12:05)  Potassium, Random Urine: 68 mmol/L (06-08 @ 12:05)  Sodium, Random Urine: <20 mmol/L (06-08 @ 03:41)  Creatinine, Random Urine: 71 mg/dL (06-08 @ 03:41)  Protein/Creatinine Ratio Calculation: 1.7 Ratio (06-08 @ 03:41)  Osmolality, Random Urine: 392 mosm/kg (06-08 @ 03:41)  Potassium, Random Urine: 68 mmol/L (06-08 @ 03:41)        RADIOLOGY & ADDITIONAL STUDIES:           Patient is a 78y Male seen and evaluated at bedside. Overnight events noted. Pt with significant bleeding 1.7L from the fistula. Got total of 3 uPRBC yesterday. Albumin was continued.       Meds:    albumin human 25% IVPB 100 every 8 hours  albuterol/ipratropium for Nebulization 3 every 6 hours PRN  allopurinol 300 every 24 hours  atorvastatin 20 at bedtime  carvedilol 6.25 every 12 hours  chlorhexidine 2% Cloths 1 <User Schedule>  cholestyramine Powder (Sugar-Free) 4 every 24 hours  dextrose 10% Bolus 125 once  dextrose 5%. 1000 <Continuous>  dextrose 5%. 1000 <Continuous>  dextrose 50% Injectable 25 once  dextrose 50% Injectable 12.5 once  dextrose Oral Gel 15 once PRN  epoetin matt (EPOGEN) Injectable 69475 every 7 days  ergocalciferol 24170 <User Schedule>  glucagon  Injectable 1 once  hydrocortisone Enema 100 every 12 hours  insulin lispro (ADMELOG) corrective regimen sliding scale  three times a day before meals  insulin lispro (ADMELOG) corrective regimen sliding scale  at bedtime  levothyroxine Injectable 40 <User Schedule>  LORazepam   Injectable 0.5 every 8 hours PRN  melatonin 5 at bedtime PRN  midodrine. 5 every 8 hours  omegaven 10 grams/100 mL 60 Gram(s) 60 <Continuous>  ondansetron Injectable 4 every 6 hours  pancrelipase  (CREON 24,000 Lipase Units) 1 three times a day with meals  Parenteral Nutrition - Adult 1 <Continuous>  Parenteral Nutrition - Adult 1 <Continuous>  ursodiol Capsule 300 every 8 hours  venlafaxine XR. 300 every 24 hours      T(C): , Max: 36.4 (06-08-24 @ 18:08)  T(F): , Max: 97.5 (06-08-24 @ 18:08)  HR: 65 (06-09-24 @ 11:00)  BP: 99/52 (06-09-24 @ 11:00)  BP(mean): 73 (06-09-24 @ 11:00)  RR: 17 (06-09-24 @ 11:00)  SpO2: 96% (06-09-24 @ 11:00)  Wt(kg): --    06-08 @ 07:01  -  06-09 @ 07:00  --------------------------------------------------------  IN: 4479 mL / OUT: 3150 mL / NET: 1329 mL    06-09 @ 07:01  -  06-09 @ 12:34  --------------------------------------------------------  IN: 550 mL / OUT: 300 mL / NET: 250 mL          Review of Systems:  ROS negative except as per HPI      PHYSICAL EXAM:  Constitutional: NAD, Lethargic  Head: NCAT, EOMI  Respiratory: No resp distress, on BiPAP  Cardiac: Regular rate, no murmur  Gastrointestinal: abdomen nondistended, ileostomy and fistula noted  Extremities: WWP, 2+ LE edema b/l  Dermatologic: skin warm, scattered ecchymoses noted  Neurologic: Lethargic, not interacting        LABS:                        8.3    11.84 )-----------( 68       ( 09 Jun 2024 05:03 )             24.2     06-09    135  |  94<L>  |  132<H>  ----------------------------<  190<H>  3.8   |  26  |  3.50<H>    Ca    8.1<L>      09 Jun 2024 05:03  Phos  5.5     06-09  Mg     2.5     06-09    TPro  5.9<L>  /  Alb  2.9<L>  /  TBili  12.3<H>  /  DBili  See Note  /  AST  312<H>  /  ALT  30  /  AlkPhos  63  06-09      PT/INR - ( 09 Jun 2024 05:03 )   PT: 15.9 sec;   INR: 1.41          PTT - ( 09 Jun 2024 05:03 )  PTT:38.7 sec  Urinalysis Basic - ( 09 Jun 2024 05:03 )    Color: x / Appearance: x / SG: x / pH: x  Gluc: 190 mg/dL / Ketone: x  / Bili: x / Urobili: x   Blood: x / Protein: x / Nitrite: x   Leuk Esterase: x / RBC: x / WBC x   Sq Epi: x / Non Sq Epi: x / Bacteria: x      Sodium, Random Urine: 20 mmol/L (06-08 @ 12:05)  Creatinine, Random Urine: 70 mg/dL (06-08 @ 12:05)  Protein/Creatinine Ratio Calculation: 2.2 Ratio (06-08 @ 12:05)  Osmolality, Random Urine: 398 mosm/kg (06-08 @ 12:05)  Potassium, Random Urine: 68 mmol/L (06-08 @ 12:05)  Sodium, Random Urine: <20 mmol/L (06-08 @ 03:41)  Creatinine, Random Urine: 71 mg/dL (06-08 @ 03:41)  Protein/Creatinine Ratio Calculation: 1.7 Ratio (06-08 @ 03:41)  Osmolality, Random Urine: 392 mosm/kg (06-08 @ 03:41)  Potassium, Random Urine: 68 mmol/L (06-08 @ 03:41)        RADIOLOGY & ADDITIONAL STUDIES:    Creatinine: 3.50 mg/dL (06-09-24 @ 05:03)  Creatinine: 3.43 mg/dL (06-08-24 @ 21:36)  Creatinine: 3.31 mg/dL (06-08-24 @ 08:58)  Creatinine: 3.10 mg/dL (06-08-24 @ 04:28)  Creatinine: 2.70 mg/dL (06-07-24 @ 04:52)  Creatinine: 2.23 mg/dL (06-06-24 @ 04:13)  Creatinine: 1.93 mg/dL (06-05-24 @ 05:30)  Creatinine: 1.67 mg/dL (06-04-24 @ 03:05)  Creatinine: 1.50 mg/dL (06-03-24 @ 05:12)  Creatinine: 1.57 mg/dL (06-02-24 @ 05:12)

## 2024-06-09 NOTE — PROGRESS NOTE ADULT - ASSESSMENT
A/P. Bleeding episode as noted.    Stable minus bleeding currently Hgb 8 this am UO decreasing despite lasix. Creatinine is rising slowly   TPN as before   Monitor coag status and Hct   Diet as tolerated.  Follow fistula output and transfuse as needed.  Goal is hgb about 8.

## 2024-06-09 NOTE — PROGRESS NOTE ADULT - ASSESSMENT
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 Prolonged hospital course significant for recurrent AKIs, cholestatic transaminitis s/p perc cony, prior DVTs, PNAs, fungemia, sinus pauses s/p pacemaker, return to [facility] multiple times after discharge in Feb2024 for recurrent bleeding from ileostomy, fistula and wound bed, most recently s/p IR embolization of L branch of inferior epigastric artery via R groin access (4/16), S/p ileoscopy, found an ulcer w/ clot proximal to stoma w/ hemoclip applied (5/1). most recently transferred to SICU 5/8 overnight for 2L blood loss from fistula site with subsequent hypotension. s/p ileoscopy, noting erythematous patch of bowel just deep to the ECF, without any active bleeding (5/9). S/p IR angiography noting several areas of hyperemia in the jejunum adjacent to the stoma however no evidence of active bleeding (5/9), also noted MSSA bacteremia (5/9) likely due to line sepsis/PICC infection, started on Ancef, portal vein thrombosis per US from 5/13/24, but not candidate for anticoagulation due to persistent bleeding. Ultimately transferred from [facility] to [facility] 5/20 for possible surgical intervention for persist bleeding, but deemed not candidate for surgery due to portal hypertension. pt readmitted to [facility] Medicine tele on 5/29 for fistula wound bed bleeding. s/p PRBC transfusion. Pt transferred from [facility] to SICU on 6/1 for persistent fistula wound bed bleeding with acute blood loss anemia for close management. With total body overload, s/p L thoracentesis (6/4). Patient with bleeding overnight, received 3 PRBC, 1 plasma, and 1 FFP, with morning hgb 8.3 (6.6), will continue to monitor.    Recommendations:  - c/w supportive care  - appreciate palliative care recs  - monitor bleeding from ECF and ileostomy, transfuse as necessary

## 2024-06-09 NOTE — PROGRESS NOTE ADULT - SUBJECTIVE AND OBJECTIVE BOX
Overnight events: FeUrea 24.2%. Renal continue 25% albumin and 1u PRBC. Hemorrhage from fistula - EBL likely > 1L. Controlled with pressure, surgicell, fibirillar. R femoral cordis placed. Given additional 2uPRBC, 1 FFP, 1 plt, 1 cryo. 20 lasix after products given. Cystatin C 5.01 with eGFR of 9.      POD#  6/4: L Thoracentesis (Pulm)    SUBJECTIVE: Patient seen at bedside with attending.       MEDICATIONS  (STANDING):  albumin human 25% IVPB 100 milliLiter(s) IV Intermittent every 8 hours  allopurinol 300 milliGRAM(s) Oral every 24 hours  atorvastatin 20 milliGRAM(s) Oral at bedtime  carvedilol 6.25 milliGRAM(s) Oral every 12 hours  chlorhexidine 2% Cloths 1 Application(s) Topical <User Schedule>  cholestyramine Powder (Sugar-Free) 4 Gram(s) Oral every 24 hours  dextrose 10% Bolus 125 milliLiter(s) IV Bolus once  dextrose 5%. 1000 milliLiter(s) (100 mL/Hr) IV Continuous <Continuous>  dextrose 5%. 1000 milliLiter(s) (50 mL/Hr) IV Continuous <Continuous>  dextrose 50% Injectable 25 Gram(s) IV Push once  dextrose 50% Injectable 12.5 Gram(s) IV Push once  epoetin matt (EPOGEN) Injectable 96958 Unit(s) IV Push every 7 days  ergocalciferol 71170 Unit(s) Oral <User Schedule>  glucagon  Injectable 1 milliGRAM(s) IntraMuscular once  hydrocortisone Enema 100 milliGRAM(s) Rectal every 12 hours  insulin lispro (ADMELOG) corrective regimen sliding scale   SubCutaneous three times a day before meals  insulin lispro (ADMELOG) corrective regimen sliding scale   SubCutaneous at bedtime  levothyroxine Injectable 40 MICROGram(s) IV Push <User Schedule>  LORazepam   Injectable 0.25 milliGRAM(s) IV Push once  midodrine. 5 milliGRAM(s) Oral every 8 hours  omegaven 10 grams/100 mL 60 Gram(s) 60 Gram(s) (50 mL/Hr) IV Continuous <Continuous>  ondansetron Injectable 4 milliGRAM(s) IV Push every 6 hours  pancrelipase  (CREON 24,000 Lipase Units) 1 Capsule(s) Oral three times a day with meals  Parenteral Nutrition - Adult 1 Each (104 mL/Hr) TPN Continuous <Continuous>  ursodiol Capsule 300 milliGRAM(s) Oral every 8 hours  venlafaxine XR. 300 milliGRAM(s) Oral every 24 hours    MEDICATIONS  (PRN):  albuterol/ipratropium for Nebulization 3 milliLiter(s) Nebulizer every 6 hours PRN Shortness of Breath and/or Wheezing  dextrose Oral Gel 15 Gram(s) Oral once PRN Blood Glucose LESS THAN 70 milliGRAM(s)/deciliter  LORazepam   Injectable 0.25 milliGRAM(s) IV Push every 24 hours PRN Anxiety  melatonin 5 milliGRAM(s) Oral at bedtime PRN Insomnia      Vital Signs Last 24 Hrs  T(C): 36.4 (09 Jun 2024 07:00), Max: 36.4 (08 Jun 2024 18:08)  T(F): 97.5 (09 Jun 2024 07:00), Max: 97.5 (08 Jun 2024 18:08)  HR: 81 (09 Jun 2024 07:00) (62 - 95)  BP: 104/54 (09 Jun 2024 07:00) (88/48 - 119/59)  BP(mean): 76 (09 Jun 2024 07:00) (63 - 85)  RR: 20 (09 Jun 2024 07:00) (13 - 28)  SpO2: 96% (09 Jun 2024 07:00) (96% - 100%)    Parameters below as of 09 Jun 2024 07:00  Patient On (Oxygen Delivery Method): BiPAP/CPAP    O2 Concentration (%): 40    Physical Exam:  General: NAD, resting comfortably in bed  Pulmonary: Nonlabored breathing, no respiratory distress  Cardiovascular: NSR  Abdominal: soft, NT/ND, fistula site, recently suction, no active bleeding noted, ostomy with surgiseal in place, no active or output noted  Extremities: WWP, normal strength  Neuro: A/O x 3, CNs II-XII grossly intact,     I&O's Summary    08 Jun 2024 07:01  -  09 Jun 2024 07:00  --------------------------------------------------------  IN: 4479 mL / OUT: 3150 mL / NET: 1329 mL    09 Jun 2024 07:01  -  09 Jun 2024 07:30  --------------------------------------------------------  IN: 150 mL / OUT: 0 mL / NET: 150 mL        LABS:                        8.3    11.84 )-----------( 68       ( 09 Jun 2024 05:03 )             24.2     06-09    135  |  94<L>  |  132<H>  ----------------------------<  190<H>  3.8   |  26  |  3.50<H>    Ca    8.1<L>      09 Jun 2024 05:03  Phos  5.5     06-09  Mg     2.5     06-09    TPro  5.9<L>  /  Alb  2.9<L>  /  TBili  12.3<H>  /  DBili  See Note  /  AST  312<H>  /  ALT  30  /  AlkPhos  63  06-09    PT/INR - ( 09 Jun 2024 05:03 )   PT: 15.9 sec;   INR: 1.41          PTT - ( 09 Jun 2024 05:03 )  PTT:38.7 sec  Urinalysis Basic - ( 09 Jun 2024 05:03 )    Color: x / Appearance: x / SG: x / pH: x  Gluc: 190 mg/dL / Ketone: x  / Bili: x / Urobili: x   Blood: x / Protein: x / Nitrite: x   Leuk Esterase: x / RBC: x / WBC x   Sq Epi: x / Non Sq Epi: x / Bacteria: x      CAPILLARY BLOOD GLUCOSE      POCT Blood Glucose.: 184 mg/dL (09 Jun 2024 06:43)  POCT Blood Glucose.: 166 mg/dL (08 Jun 2024 16:59)  POCT Blood Glucose.: 188 mg/dL (08 Jun 2024 10:21)  POCT Blood Glucose.: 181 mg/dL (08 Jun 2024 07:40)    LIVER FUNCTIONS - ( 09 Jun 2024 05:03 )  Alb: 2.9 g/dL / Pro: 5.9 g/dL / ALK PHOS: 63 U/L / ALT: 30 U/L / AST: 312 U/L / GGT: x             RADIOLOGY & ADDITIONAL STUDIES:

## 2024-06-09 NOTE — PROGRESS NOTE ADULT - ATTENDING COMMENTS
I agree with the fellow's findings and plans as written above with the following additions/amendments:    Seen and examined at bedside. Continues to have worsening ELVI despite fluids, dropping hgb likley hypovolemic ATN on top of ?HRS, continue to give PRBC as needed. Further recs as above, discussed in detail with primary team. GOC ongoing.

## 2024-06-09 NOTE — PROGRESS NOTE ADULT - SUBJECTIVE AND OBJECTIVE BOX
(Covering for Aronoff)  5 ICU    Episode of bleeding from fistula wound edges last night that was severe. 1750 ml total.  Was controlled with hemocautery, fibrillation, AgNO3, pressure, etc.  Total 3 units prbc given last pm (4 total for day) plus FFP x 1 and platelets x 1    Patient sleeping this am and no further bleeding.      ICU Vital Signs Last 24 Hrs  T(C): 36.4 (09 Jun 2024 07:00), Max: 36.4 (08 Jun 2024 18:08)  T(F): 97.5 (09 Jun 2024 07:00), Max: 97.5 (08 Jun 2024 18:08)  HR: 65 (09 Jun 2024 11:00) (62 - 91)  BP: 99/52 (09 Jun 2024 11:00) (88/48 - 119/59)  BP(mean): 73 (09 Jun 2024 11:00) (65 - 90)  ABP: --  ABP(mean): --  RR: 17 (09 Jun 2024 11:00) (13 - 28)  SpO2: 96% (09 Jun 2024 11:00) (96% - 100%)    O2 Parameters below as of 09 Jun 2024 12:00  Patient On (Oxygen Delivery Method): BiPAP/CPAP    Abd: ileostomy viable with no output.   Fistula wound minus bleeding or blood, edges dry   Ext; edematous    UO. 400 ml/ shift, 650 ml/24 hrs  Ileostomy. Minimal ml/24 hrs  Fistula. 450 ml charted  for 24 hrs  Blood loss from fistula wound:  1750 ml  4 units PRBC, 1 FFP, 1 platelet /24 hr  TPN                          8.3    11.84 )-----------( 68       ( 09 Jun 2024 05:03 )             24.2   06-09    135  |  94<L>  |  132<H>  ----------------------------<  190<H>  3.8   |  26  |  3.50<H>    Ca    8.1<L>      09 Jun 2024 05:03  Phos  5.5     06-09  Mg     2.5     06-09    TPro  5.9<L>  /  Alb  2.9<L>  /  TBili  12.3<H>  /  DBili  See Note  /  AST  312<H>  /  ALT  30  /  AlkPhos  63  06-09

## 2024-06-10 NOTE — PROGRESS NOTE ADULT - ATTENDING COMMENTS
I agree with the fellow's findings and plans as written above with the following additions/amendments:    Seen and examined at bedside. Continued worsening of kidney function although unable to measure creatinine due to icteric blood. Still able to awaken and answer questions appropriately. No acute indication for KRT, monitoring closely. Further recs as above, discussed with primary team and outpatient nephrologist in detail.

## 2024-06-10 NOTE — PROGRESS NOTE ADULT - ASSESSMENT
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 Prolonged hospital course significant for recurrent AKIs, cholestatic transaminitis s/p perc cony, prior DVTs, PNAs, fungemia, sinus pauses s/p pacemaker, return to [facility] multiple times after discharge in Feb2024 for recurrent bleeding from ileostomy, fistula and wound bed, most recently s/p IR embolization of L branch of inferior epigastric artery via R groin access (4/16), S/p ileoscopy, found an ulcer w/ clot proximal to stoma w/ hemoclip applied (5/1). most recently transferred to SICU 5/8 overnight for 2L blood loss from fistula site with subsequent hypotension. s/p ileoscopy, noting erythematous patch of bowel just deep to the ECF, without any active bleeding (5/9). S/p IR angiography noting several areas of hyperemia in the jejunum adjacent to the stoma however no evidence of active bleeding (5/9), also noted MSSA bacteremia (5/9) likely due to line sepsis/PICC infection, started on Ancef, portal vein thrombosis per US from 5/13/24, but not candidate for anticoagulation due to persistent bleeding. Ultimately transferred from [facility] to [facility] 5/20 for possible surgical intervention for persist bleeding, but deemed not candidate for surgery due to portal hypertension. pt readmitted to [facility] tele on 5/29 for fistula wound bed bleeding. s/p PRBC transfusion. Pt transferred from [facility] to SICU on 6/1 for persistent fistula wound bed bleeding with acute blood loss anemia for close management. With total body overload, s/p L thoracentesis (6/4).     Neuro: Pain: Dilaudid PRN. Hx: Anxiety: Continue Effexor and Ativan PRN. Ambien on hold. Nausea: Zofran PRN.  CV: maintaining MAPs > 65 without pressors. Hx AFib: cont coreg, no AC given recurrent unprovoked bleed. Hx HLD: cont Atorvastatin. Total body overload - continue 25% albumin. Weekly Epogen.  Pulm: Pleural Effusions b/l, no resp distress on Room air, nightly BIPAP at 40%. POCUS as needed, L thoracentesis (6/4).   GI: LRD. persistent elevated T bili s/p Hepatology consults and workup in past, US with portal vein thrombosis 5/13, but unable to anticoagulate due to recurrent bleeding. Cont cholestyramine. C/w Ursodiol. Cont Creon. cont octreotide (in TPN), Crohns: cont hydrocortisone enemas via ileostomy, Continue TPN, smofflipids on Monday, Omegaven on Tues to Sunday.  holding off on remicade in setting of MSSA bacteremia. Dr Zhao consulted on interventional options for portal htn.   : Voids. BUN/Cr uptrending, Renal consult - suspecting hepatorenal syndrome. Starting midodrine 5q8 for portal HTN and hepatorenal. Pending repeat UA/lytes for possible diuresis with worsening Cr  ID: Not currently on abx. Prior MSSA Bacteremia from BCx (5/9), TTE negative for vegetation. 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 // PREVIOUS: Cefepime (5/10), Vanc x1 (5/9)  Endo: mISSc. Hx: Hypothyroidism. Continue synthroid. insulin in TPN, watch fingerstick.   PPx: SCDs, SQH on hold for bleeding   Heme: Acute blood loss anemia, likely due to EC fistula area bleed. Received multiple PRBCs, FFP, plt and cryo in recent admission. seen by Leticia in recent admission. 6/9 EBL 1L from fistula, given 3u prbc, 1 FFP, 1 platlet, 1 cryo.  Wounds: Wound manager managed by Wound care team.   Lines: R fem cordis (6/9-), double lumen L PICC (5/17--)  PT: OOBTC daily.   Dispo: SICU

## 2024-06-10 NOTE — PROGRESS NOTE ADULT - SUBJECTIVE AND OBJECTIVE BOX
INTERVAL HPI/OVERNIGHT EVENTS:  ON: Additional Ativan 0.25 for Anxiety.    SUBJECTIVE:  Patient seen and examined by chief resident and team on AM rounds. Patient appeared fatigued this morning. He denied any pain or nausea overnight. Just prior to examination at bedside, patient had bleed from 12 o clock position, approx 250cc of bloody output, hemostasis achieved with surgicell and pressure. Cr/BUN continue to rise, with Hgb 7.8, Plt 50s this AM. T bili continues to rise to 15.8. Bleeding at least partially related to thrombocytopenia in setting of uremic platelet dysfunction as well.     MEDICATIONS  (STANDING):  allopurinol 300 milliGRAM(s) Oral every 24 hours  atorvastatin 20 milliGRAM(s) Oral at bedtime  carvedilol 6.25 milliGRAM(s) Oral every 12 hours  chlorhexidine 2% Cloths 1 Application(s) Topical <User Schedule>  cholestyramine Powder (Sugar-Free) 4 Gram(s) Oral every 24 hours  dextrose 10% Bolus 125 milliLiter(s) IV Bolus once  dextrose 5%. 1000 milliLiter(s) (100 mL/Hr) IV Continuous <Continuous>  dextrose 5%. 1000 milliLiter(s) (50 mL/Hr) IV Continuous <Continuous>  dextrose 50% Injectable 25 Gram(s) IV Push once  dextrose 50% Injectable 12.5 Gram(s) IV Push once  epoetin matt (EPOGEN) Injectable 50637 Unit(s) IV Push every 7 days  ergocalciferol 65426 Unit(s) Oral <User Schedule>  glucagon  Injectable 1 milliGRAM(s) IntraMuscular once  hydrocortisone Enema 100 milliGRAM(s) Rectal every 12 hours  insulin lispro (ADMELOG) corrective regimen sliding scale   SubCutaneous three times a day before meals  insulin lispro (ADMELOG) corrective regimen sliding scale   SubCutaneous at bedtime  levothyroxine Injectable 40 MICROGram(s) IV Push <User Schedule>  lipid, fat emulsion (Fish Oil and Plant Based) 20% Infusion 0.5 Gm/kG/Day (20.83 mL/Hr) IV Continuous <Continuous>  midodrine. 5 milliGRAM(s) Oral every 8 hours  pancrelipase  (CREON 24,000 Lipase Units) 1 Capsule(s) Oral three times a day with meals  Parenteral Nutrition - Adult 1 Each (104 mL/Hr) TPN Continuous <Continuous>  Parenteral Nutrition - Adult 1 Each (104 mL/Hr) TPN Continuous <Continuous>  ursodiol Capsule 300 milliGRAM(s) Oral every 8 hours  venlafaxine XR. 150 milliGRAM(s) Oral every 24 hours    MEDICATIONS  (PRN):  albuterol/ipratropium for Nebulization 3 milliLiter(s) Nebulizer every 6 hours PRN Shortness of Breath and/or Wheezing  dextrose Oral Gel 15 Gram(s) Oral once PRN Blood Glucose LESS THAN 70 milliGRAM(s)/deciliter  HYDROmorphone  Injectable 0.5 milliGRAM(s) IV Push every 4 hours PRN Severe Pain (7 - 10) or RR >25  LORazepam   Injectable 0.5 milliGRAM(s) IV Push every 8 hours PRN Anxiety  melatonin 5 milliGRAM(s) Oral at bedtime PRN Insomnia  ondansetron Injectable 4 milliGRAM(s) IV Push every 6 hours PRN Nausea and/or Vomiting      Vital Signs Last 24 Hrs  T(C): 36.4 (10 Jozef 2024 08:00), Max: 36.4 (09 Jun 2024 12:00)  T(F): 97.6 (10 Jozef 2024 08:00), Max: 97.6 (10 Jozef 2024 08:00)  HR: 64 (10 Jozef 2024 11:00) (62 - 93)  BP: 100/51 (10 Jozef 2024 11:00) (94/47 - 119/57)  BP(mean): 73 (10 Jozef 2024 11:00) (68 - 82)  RR: 16 (10 Jozef 2024 11:00) (14 - 38)  SpO2: 97% (10 Jozef 2024 11:00) (95% - 100%)    Parameters below as of 10 Jozef 2024 12:00  Patient On (Oxygen Delivery Method): BiPAP/CPAP    O2 Concentration (%): 50    PHYSICAL EXAM:  Constitutional: A&Ox3. NAD, although appears fatigued.   HEENT: MMM, PEERLA  Respiratory: non labored breathing, no respiratory distress. Appears comfortable on BIPAP.   Cardiovascular: NSR, RRR  Gastrointestinal: soft, NTND abdomen. Ileostomy site without output. ECF wound bed w/ stigmata of recent bleed at 12 o clock position, light brown fluid in bag mixed with minimal blood. Fistula present pink and well perfused, prolapsed.   Extremities: WWP, + BLE pitting edema  Skin: jaundiced.      I&O's Detail    09 Jun 2024 07:01  -  10 Jozef 2024 07:00  --------------------------------------------------------  IN:    Albumin 25%  -  50 mL: 100 mL    freetext medication - Infusion: 650 mL    Oral Fluid: 120 mL    TPN (Total Parenteral Nutrition): 2300 mL  Total IN: 3170 mL    OUT:    Blood Loss (mL): 250 mL    Drain (mL): 700 mL    Voided (mL): 425 mL  Total OUT: 1375 mL    Total NET: 1795 mL      10 Jozef 2024 07:01  -  10 Jozef 2024 11:58  --------------------------------------------------------  IN:    freetext medication - Infusion: 50 mL    TPN (Total Parenteral Nutrition): 516 mL  Total IN: 566 mL    OUT:  Total OUT: 0 mL    Total NET: 566 mL          LABS:                        7.6    14.51 )-----------( x        ( 10 Jozef 2024 07:32 )             22.9     06-10    137  |  98  |  149<H>  ----------------------------<  149<H>  4.1   |  27  |  See Note    Ca    8.8      10 Jozef 2024 07:32  Phos  4.7     06-10  Mg     2.7     06-10    TPro  6.6  /  Alb  3.0<L>  /  TBili  15.8<H>  /  DBili  9.2<H>  /  AST  287<H>  /  ALT  33  /  AlkPhos  72  06-10    PT/INR - ( 10 Jozef 2024 05:00 )   PT: 17.5 sec;   INR: 1.55          PTT - ( 10 Jozef 2024 05:00 )  PTT:37.1 sec  Urinalysis Basic - ( 10 Jozef 2024 07:32 )    Color: x / Appearance: x / SG: x / pH: x  Gluc: 149 mg/dL / Ketone: x  / Bili: x / Urobili: x   Blood: x / Protein: x / Nitrite: x   Leuk Esterase: x / RBC: x / WBC x   Sq Epi: x / Non Sq Epi: x / Bacteria: x        RADIOLOGY & ADDITIONAL STUDIES:

## 2024-06-10 NOTE — PROGRESS NOTE ADULT - ASSESSMENT
77 yo M w/ recurrent ELVI on CKD in setting of persistent bleeding and output from abdominal fistula with prolonged hospitalization significant for multiple transfusions, hypotension requiring ICU, portal vein thrombosis, MSSA bacteremia. Noted multiple hypotensive episodes. Received IV contrast on 6/3. Last positive blood cultures on 5/9. Continues to have active bleeding from fistula. On TPN.    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 Possibly with a component of BRENDA, septic/hemodynamic ATN, hepatorenal physiology. Noted subnephrotic proteinuria of 1.7g and mild pyuria. New UA 6/8 noted, persistent renal hypoperfusion given low Rebeca  - Can hold further albumin. Management of renal injury involves preventing further renal hypoperfusion but at the time difficult to do with significant output and bleeding from abd fistula.  - On midodrine  - PRBCs as needed in setting of active bleed to maintain hgb >7  - Hold antihypertensives, maintain SBP >100 as able for renal perfusion  - Adjust TPN to increase volume to maintain net even to mild positive volume status for now  - Trend urine sodium, please check BID  - BMP BID  - Renal US  - Avoid nephrotoxic agents  - Strict I&O  - Dose abx to current egfr  - No indication for RRT at this time. Will continue to follow closely. GOC discussion ongoing

## 2024-06-10 NOTE — PROGRESS NOTE ADULT - SUBJECTIVE AND OBJECTIVE BOX
Covering for Dr. Peterson.    Events of weekend noted, significant bleeding 6/8 requiring 4U PRBC and other products.  Not actively bleeding now.  c/o thirst.  Fistula with exposed mucosa, clear fluid emanating.    Bilirubin up to 15    A/P: Enteroatmospheric fistula with hemorrhage from wound edge due to portal hypertension, coagulopathy, and thrombocytopenia.  Requiring significant amounts of blood products.  Likely hepatorenal syndrome.  At high risk for hepatic encephalopathy with TIPS.  1. Continue supportive care with blood products and hemostatic agents prn  2. Continue TPN  3. Discussions with palliative care re: DNR/DNI; he stated to me "I want to live,"  I do not believe continued blood transfusions at this rate is a viable option.  4. Allow comfort feeds.

## 2024-06-10 NOTE — PROGRESS NOTE ADULT - PROBLEM SELECTOR PLAN 4
.  Complicated by ECF, non-healing wound, and recurrent bleeding  -no further surgical interventions possible  -tentative plan to not pursue further transfusions

## 2024-06-10 NOTE — PROGRESS NOTE ADULT - CONVERSATION DETAILS
Reviewed patient's hospital course and interval developments. Discussed advanced directives including code status, HCP completion, and hospice eligibility. Multiple discussions throughout the day regarding expected disease trajectory and plan of care.    Early today, had a family meeting with wife and son: offered further clarity on expected disease trajectory, they would be amenable to a more symptom-directed approach given the understanding that further invasive interventions are not being offered (including RRT). Discussed with patient with his family at bedside: shared that he is starting to die and we would like to work on giving him more control over the situation, he stated he was not surprised by that news and then asked for the chance to rest.    Later in the today, patient discussed  arrangements briefly with his family but becoming progressively more lethargic. Unable to participate in complex medical decisions later in the day. Family is confident that while patient wants to live, he would not want to prolong suffering if there is no reasonable expectation of meaningful recovery. Transitioning towards a more symptom-directed/comfort-based approach in the context of patient now being aware of his poor prognosis and limited life expectancy.    -MOLST completed with DNR/DNI.  -Liberalize diet as tolerated.  -Adjusted symptom PRN regimen.  -Allow patient to dictate BIPAP use.  -No further transfusions.  -Limit bloodwork and FS.  -No RRT/HD.  -Continue with TPN for now but will likely wean.    Will continue to discuss plan of care with patient if he can tolerate discussion to address any existential distress, otherwise wife is appropriate alternate decision maker. Reviewed patient's hospital course and interval developments. Discussed advanced directives including code status, HCP completion, and hospice eligibility. Multiple discussions throughout the day regarding expected disease trajectory and plan of care.    Early today, had a family meeting with wife and son: offered further clarity on expected disease trajectory, they would be amenable to a more symptom-directed approach given the understanding that further invasive interventions are not being offered (including RRT). Discussed with patient with his family at bedside: shared that he is starting to die and we would like to work on giving him more control over the situation, he stated he was not surprised by that news and then asked for the chance to rest.    Later in the today, patient discussed  arrangements briefly with his family but becoming progressively more lethargic. Unable to participate in complex medical decisions later in the day. Family is confident that while patient wants to live, he would not want to prolong suffering if there is no reasonable expectation of meaningful recovery. Transitioning towards a more symptom-directed/comfort-based approach in the context of patient now being aware of his poor prognosis and limited life expectancy.    -MOLST completed with DNR/DNI.  -Liberalize diet as tolerated.  -Adjusted symptom PRN regimen.  -Allow patient to dictate BIPAP use.  -No further transfusions.  -Limit bloodwork and FS.  -No RRT/HD.  -Will wean TPN.    Will continue to discuss plan of care with patient if he can tolerate discussion to address any existential distress, otherwise wife is appropriate alternate decision maker.

## 2024-06-10 NOTE — PROGRESS NOTE ADULT - ASSESSMENT
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 Prolonged hospital course significant for recurrent AKIs, cholestatic transaminitis s/p perc cony, prior DVTs, PNAs, fungemia, sinus pauses s/p pacemaker, return to [facility] multiple times after discharge in Feb2024 for recurrent bleeding from ileostomy, fistula and wound bed, most recently s/p IR embolization of L branch of inferior epigastric artery via R groin access (4/16), S/p ileoscopy, found an ulcer w/ clot proximal to stoma w/ hemoclip applied (5/1). most recently transferred to SICU 5/8 overnight for 2L blood loss from fistula site with subsequent hypotension. s/p ileoscopy, noting erythematous patch of bowel just deep to the ECF, without any active bleeding (5/9). S/p IR angiography noting several areas of hyperemia in the jejunum adjacent to the stoma however no evidence of active bleeding (5/9), also noted MSSA bacteremia (5/9) likely due to line sepsis/PICC infection, started on Ancef, portal vein thrombosis per US from 5/13/24, but not candidate for anticoagulation due to persistent bleeding. Ultimately transferred from [facility] to [facility] 5/20 for possible surgical intervention for persist bleeding, but deemed not candidate for surgery due to portal hypertension. pt readmitted to [facility] Medicine tele on 5/29 for fistula wound bed bleeding, s/p PRBC transfusion. Pt transferred from [facility] to SICU on 6/1 for persistent fistula wound bed bleeding with acute blood loss anemia for close management. With total body overload, s/p L thoracentesis (6/4). Hospital course c/b recurrent bleeding from surrounding the ECF site, with frequent transfusion requirements.     Recommendations:  - c/w supportive care  - appreciate palliative care recs: GO discussion this AM.   - monitor bleeding from ECF and ileostomy, transfuse as necessary  - C/w TPN, c/w PO comfort feeds  Plan discussed with chief resident and attending, Dr. Tobar

## 2024-06-10 NOTE — PROGRESS NOTE ADULT - PROBLEM SELECTOR PLAN 6
.  Complex medical decision making / symptom management in the setting of critical illness.    Will continue to follow for ongoing GOC discussion / titration of palliative regimen. Emotional support provided, questions answered.  Active Psychosocial Referrals:  [x]Social Work/Case management []PT/OT []Chaplaincy []Hospice  []Patient/Family Support []Holistic RN [x]Massage Therapy []Music Therapy []Ethics  Coping: [] well [x] with difficulty [] poor coping [] unable to assess  Support system: [x] strong [] adequate [] inadequate    For new or uncontrolled symptoms, please call Palliative Care at [phone]). The service is available 24/7 (including nights & weekends) to provide symptom management recommendations over the phone as appropriate

## 2024-06-10 NOTE — PROGRESS NOTE ADULT - ASSESSMENT
·	s/p family meeting with wife and son: offered further clarity on expected disease trajectory, they would be amenable to a more symptom-directed approach given the understanding that further invasive interventions are not being offered (including RRT)  ·	discussed with patient with his family at bedside: shared that he is starting to die and we would like to work on giving him more control over the situation, he stated he was not surprised by that news and then asked for the chance to rest  ·	will follow-up routinely throughout the day for further discussion with the patient and will update the involved providers along the way     ·	s/p family meeting with wife and son: offered further clarity on expected disease trajectory, they would be amenable to a more symptom-directed approach given the understanding that further invasive interventions are not being offered (including RRT)  ·	discussed with patient with his family at bedside: shared that he is starting to die and we would like to work on giving him more control over the situation, he stated he was not surprised by that news  ·	see [facility] note regarding transition to a more symptom-directed/comfort based approach  ·	anticipate patient will die in the hospital and would be hesitant to eventually transition to [facility] for inpatient hospice due care needs (wound care, bleeding, family/staff familiarity, etc) -> ultimately, not planning for inpatient hospice transition but can discuss if other involved providers are inclined to explore    In addition to the E/M visit, an advance care planning meeting was performed. Start time: 10:10AM; End time: 11:00AM; Total time: 50min. A face to face meeting to discuss advance care planning was held today regarding: LARRY KELLY. Primary decision maker: Patient is unable to participate in decision making; Alternate/surrogate: Wife. Discussed advance directives including, but not limited to, healthcare proxy and code status. Decision regarding code status: DNR/DNI; Documentation completed today: [facility] note     ·	s/p family meeting with wife and son: offered further clarity on expected disease trajectory, they would be amenable to a more symptom-directed approach given the understanding that further invasive interventions are not being offered (including RRT)  ·	discussed with patient with his family at bedside: shared that he is starting to die and we would like to work on giving him more control over the situation, he stated he was not surprised by that news  ·	see [facility] note regarding transition to a more symptom-directed/comfort based approach  ·	anticipate patient will die in the hospital and would be hesitant to eventually transition to [facility] for inpatient hospice due care needs (wound care, bleeding, family/staff familiarity, etc) -> ultimately, not planning for inpatient hospice transition but can discuss if other involved providers are inclined to explore    If symptoms are uncontrolled on current regimen, then increase Dilaudid and Ativan doses to 1mg and call the HEAL Line ([id number]) for further guidance if necessary    In addition to the E/M visit, an advance care planning meeting was performed. Start time: 10:10AM; End time: 11:00AM; Total time: 50min. A face to face meeting to discuss advance care planning was held today regarding: LARRY KELLY. Primary decision maker: Patient is unable to participate in decision making; Alternate/surrogate: Wife. Discussed advance directives including, but not limited to, healthcare proxy and code status. Decision regarding code status: DNR/DNI; Documentation completed today: MACKENZIE [facility] note 77yo M with PMH of Crohn's, AFib, CKD, Enterocutaneous Fistula, and multiple long complicated hospital courses p/w bleeding and now with multi-organ failure. Palliative consulted for complex medical decision making in the setting of critical illness.    ·	s/p family meeting with wife and son: offered further clarity on expected disease trajectory, they would be amenable to a more symptom-directed approach given the understanding that further invasive interventions are not being offered (including RRT)  ·	discussed with patient with his family at bedside: shared that he is starting to die and we would like to work on giving him more control over the situation, he stated he was not surprised by that news  ·	see [facility] note regarding transition to a more symptom-directed/comfort based approach  ·	anticipate patient will die in the hospital and would be hesitant to eventually transition to [facility] for inpatient hospice due care needs (wound care, bleeding, family/staff familiarity, etc) -> ultimately, not planning for inpatient hospice transition but can discuss if other involved providers are inclined to explore    If symptoms are uncontrolled on current regimen, then increase Dilaudid and Ativan doses to 1mg and call the HEAL Line ([id number]) for further guidance if necessary    In addition to the E/M visit, an advance care planning meeting was performed. Start time: 10:10AM; End time: 11:00AM; Total time: 50min. A face to face meeting to discuss advance care planning was held today regarding: LARRY KELLY. Primary decision maker: Patient is unable to participate in decision making; Alternate/surrogate: Wife. Discussed advance directives including, but not limited to, healthcare proxy and code status. Decision regarding code status: DNR/DNI; Documentation completed today: MACKENZIE [facility] note

## 2024-06-10 NOTE — PROGRESS NOTE ADULT - PROBLEM SELECTOR PLAN 5
.  Patient is DNR/DNI, MOLST in chart  -wife is designated HCP  -hospice eligible and appropriate  -CMO, MEWS Exempt  -see GOC note

## 2024-06-10 NOTE — PROGRESS NOTE ADULT - SUBJECTIVE AND OBJECTIVE BOX
Onset: 2 days ago.  Location / description: R-sided of back pain (entire back and R-flank. Blood in underwear two days ago one time(two spots) - thinks it is rectal. No hematuria. No chest or abdominal pain. No vomiting or fever. No rashes.   Precipitating Factors: as assessed.   Pain Scale (1-10), 10 highest: 6/10 - moderate pain.  What  improves / worsens symptoms: omeprazole helps; nothing worsens.  Symptom specific medications: omeprazole  Recent visits (last 3-4 weeks) for same reason or recent surgery:  none.    PLAN:  Provider's office  See Provider within 24 hour and No appointment available in office within guidelines - directed to ED within 24 hours, as RN felt UC/ICC not appropriate for these symptoms.     Patient/Caller does not plan to follow recommendations.   and Patient/Caller encouraged to call back if any questions or concerns.    First available appointment 6/7/2024 at 0830 with PCP partner - booked this for pt at caller request. Caller would like to see if PCP or partner can see pt sooner. They declined ED at this time.  The patient/caller was advised that their symptoms are serious and if they refuse the disposition, it could result in delayed care and serious health consequences.    Patient/Caller aware RN cannot guarantee call back or sooner appointment. Encouraged to follow-up 5/31/2024 morning or sooner if needed. Caller verbalized understanding and had no further questions at this time. Aware to call back with new/worsening symptoms, or if at all needed.    [facility] [address]. Representative found appointment on 6/3/2024 at 1600 with PCP partner. RN asked representative to book this appointment for pt and cancel 6/7/2024 appointment. Rep did this for RN.    RN called spouse, relayed this information. She was grateful and agreeable. RN confirmed new appointment time with her. She verbalized understanding and had no further questions at this time.  [facility]  Andrea Harper, Palliative Care Attending  Contact Info: Call [phone] (HEAL Line) or message on Microsoft Teams (Andrea Harper)    SUBJECTIVE AND OBJECTIVE:  INTERVAL HPI/OVERNIGHT EVENTS: Interval events noted. See patient's PRN use for the past 24hrs noted below. Comprehensive symptom assessment and GOC exploration as noted below. Extensive time spent discussing plan of care with patient/family. No unexpected adverse effects of opiates noted: no sedation/dizziness/nausea.    ALLERGIES:  penicillin (Angioedema)    MEDICATIONS  (STANDING):  allopurinol 300 milliGRAM(s) Oral every 24 hours  atorvastatin 20 milliGRAM(s) Oral at bedtime  carvedilol 6.25 milliGRAM(s) Oral every 12 hours  chlorhexidine 2% Cloths 1 Application(s) Topical <User Schedule>  cholestyramine Powder (Sugar-Free) 4 Gram(s) Oral every 24 hours  dextrose 10% Bolus 125 milliLiter(s) IV Bolus once  dextrose 5%. 1000 milliLiter(s) (100 mL/Hr) IV Continuous <Continuous>  dextrose 5%. 1000 milliLiter(s) (50 mL/Hr) IV Continuous <Continuous>  dextrose 50% Injectable 25 Gram(s) IV Push once  dextrose 50% Injectable 12.5 Gram(s) IV Push once  epoetin matt (EPOGEN) Injectable 08262 Unit(s) IV Push every 7 days  ergocalciferol 48570 Unit(s) Oral <User Schedule>  glucagon  Injectable 1 milliGRAM(s) IntraMuscular once  hydrocortisone Enema 100 milliGRAM(s) Rectal every 12 hours  insulin lispro (ADMELOG) corrective regimen sliding scale   SubCutaneous three times a day before meals  insulin lispro (ADMELOG) corrective regimen sliding scale   SubCutaneous at bedtime  levothyroxine Injectable 40 MICROGram(s) IV Push <User Schedule>  lipid, fat emulsion (Fish Oil and Plant Based) 20% Infusion 0.5 Gm/kG/Day (20.83 mL/Hr) IV Continuous <Continuous>  midodrine. 5 milliGRAM(s) Oral every 8 hours  pancrelipase  (CREON 24,000 Lipase Units) 1 Capsule(s) Oral three times a day with meals  Parenteral Nutrition - Adult 1 Each (104 mL/Hr) TPN Continuous <Continuous>  Parenteral Nutrition - Adult 1 Each (104 mL/Hr) TPN Continuous <Continuous>  ursodiol Capsule 300 milliGRAM(s) Oral every 8 hours  venlafaxine XR. 150 milliGRAM(s) Oral every 24 hours    MEDICATIONS  (PRN):  albuterol/ipratropium for Nebulization 3 milliLiter(s) Nebulizer every 6 hours PRN Shortness of Breath and/or Wheezing  dextrose Oral Gel 15 Gram(s) Oral once PRN Blood Glucose LESS THAN 70 milliGRAM(s)/deciliter  HYDROmorphone  Injectable 0.5 milliGRAM(s) IV Push every 4 hours PRN Severe Pain (7 - 10) or RR >25  LORazepam   Injectable 0.5 milliGRAM(s) IV Push every 8 hours PRN Anxiety  melatonin 5 milliGRAM(s) Oral at bedtime PRN Insomnia  ondansetron Injectable 4 milliGRAM(s) IV Push every 6 hours PRN Nausea and/or Vomiting      Analgesic Use (Scheduled and PRNs) for past 24 hours:    HYDROmorphone  Injectable   0.5 milliGRAM(s) IV Push (06-10-24 @ 10:07)    LORazepam   Injectable   0.25 milliGRAM(s) IV Push (06-10-24 @ 02:46)    LORazepam   Injectable   0.5 milliGRAM(s) IV Push (06-09-24 @ 23:07)    melatonin   5 milliGRAM(s) Oral (06-09-24 @ 22:24)    omegaven 10 grams/100 mL 60 Gram(s)   50 mL/Hr IV Continuous (06-09-24 @ 17:00)    ondansetron Injectable   4 milliGRAM(s) IV Push (06-09-24 @ 17:23)   4 milliGRAM(s) IV Push (06-09-24 @ 11:13)    venlafaxine XR.   300 milliGRAM(s) Oral (06-10-24 @ 05:27)      ITEMS UNCHECKED ARE NOT PRESENT  PRESENT SYMPTOMS/REVIEW OF SYSTEMS: []Unable to obtain due to poor mentation   Source if other than patient:  []Family   []Team         Vital Signs Last 24 Hrs  T(C): 36.4 (10 Jozef 2024 08:00), Max: 36.4 (09 Jun 2024 12:00)  T(F): 97.6 (10 Jozef 2024 08:00), Max: 97.6 (10 Jzoef 2024 08:00)  HR: 65 (10 Jozef 2024 10:00) (62 - 93)  BP: 98/49 (10 Jozef 2024 10:00) (94/47 - 119/57)  BP(mean): 71 (10 Jozef 2024 10:00) (68 - 82)  RR: 26 (10 Jozef 2024 10:00) (14 - 38)  SpO2: 95% (10 Jozef 2024 10:00) (95% - 100%)    Parameters below as of 10 Jozef 2024 11:00  Patient On (Oxygen Delivery Method): BiPAP/CPAP    O2 Concentration (%): 50    LABS: Personally reviewed and interpreted                        7.6    14.51 )-----------( x        ( 10 Jozef 2024 07:32 )             22.9   06-10    137  |  98  |  149<H>  ----------------------------<  149<H>  4.1   |  27  |  See Note    Ca    8.8      10 Jozef 2024 07:32  Phos  4.7     06-10  Mg     2.7     06-10    TPro  6.6  /  Alb  3.0<L>  /  TBili  15.8<H>  /  DBili  9.2<H>  /  AST  287<H>  /  ALT  33  /  AlkPhos  72  06-10      RADIOLOGY & ADDITIONAL STUDIES: Personally reviewed and interpreted  None new    DISCUSSION OF CASE: Family - to provide updates and emotional support; Primary Team/RN - to discuss plan of care Aware to call back with new/worsening symptoms, or if at all needed.      Reason for Disposition   Patient wants to be seen   MODERATE rectal bleeding (small blood clots, passing blood without stool, or toilet water turns red)   MODERATE pain (e.g., interferes with normal activities or awakens from sleep)    Additional Information   Negative: Upper, mid or lower back pain that occurs mainly in the midline     Already assessed.    Protocols used: Back Pain-A-OH, Rectal Bleeding-A-OH, Flank Pain-A-OH     [facility] Palliative [facility]  Andrea Harper, Palliative Care Attending  Contact Info: Call [phone] (HEAL Line) or message on Microsoft Teams (Andrea Harper)    SUBJECTIVE AND OBJECTIVE:  INTERVAL HPI/OVERNIGHT EVENTS: Interval events noted. Lethargic but able to come off BIPAP briefly for GOC discussion. Received Dilaudid PRN and reported feeling better. See patient's PRN use for the past 24hrs noted below. Comprehensive symptom assessment and GOC exploration as noted below. Extensive time spent discussing plan of care with patient/family. No unexpected adverse effects of opiates noted.    ALLERGIES:  penicillin (Angioedema)    MEDICATIONS  (STANDING):  allopurinol 300 milliGRAM(s) Oral every 24 hours  atorvastatin 20 milliGRAM(s) Oral at bedtime  carvedilol 6.25 milliGRAM(s) Oral every 12 hours  chlorhexidine 2% Cloths 1 Application(s) Topical <User Schedule>  cholestyramine Powder (Sugar-Free) 4 Gram(s) Oral every 24 hours  dextrose 10% Bolus 125 milliLiter(s) IV Bolus once  dextrose 5%. 1000 milliLiter(s) (100 mL/Hr) IV Continuous <Continuous>  dextrose 5%. 1000 milliLiter(s) (50 mL/Hr) IV Continuous <Continuous>  dextrose 50% Injectable 25 Gram(s) IV Push once  dextrose 50% Injectable 12.5 Gram(s) IV Push once  epoetin matt (EPOGEN) Injectable 94691 Unit(s) IV Push every 7 days  ergocalciferol 15240 Unit(s) Oral <User Schedule>  glucagon  Injectable 1 milliGRAM(s) IntraMuscular once  hydrocortisone Enema 100 milliGRAM(s) Rectal every 12 hours  insulin lispro (ADMELOG) corrective regimen sliding scale   SubCutaneous three times a day before meals  insulin lispro (ADMELOG) corrective regimen sliding scale   SubCutaneous at bedtime  levothyroxine Injectable 40 MICROGram(s) IV Push <User Schedule>  lipid, fat emulsion (Fish Oil and Plant Based) 20% Infusion 0.5 Gm/kG/Day (20.83 mL/Hr) IV Continuous <Continuous>  midodrine. 5 milliGRAM(s) Oral every 8 hours  pancrelipase  (CREON 24,000 Lipase Units) 1 Capsule(s) Oral three times a day with meals  Parenteral Nutrition - Adult 1 Each (104 mL/Hr) TPN Continuous <Continuous>  Parenteral Nutrition - Adult 1 Each (104 mL/Hr) TPN Continuous <Continuous>  ursodiol Capsule 300 milliGRAM(s) Oral every 8 hours  venlafaxine XR. 150 milliGRAM(s) Oral every 24 hours    MEDICATIONS  (PRN):  albuterol/ipratropium for Nebulization 3 milliLiter(s) Nebulizer every 6 hours PRN Shortness of Breath and/or Wheezing  dextrose Oral Gel 15 Gram(s) Oral once PRN Blood Glucose LESS THAN 70 milliGRAM(s)/deciliter  HYDROmorphone  Injectable 0.5 milliGRAM(s) IV Push every 4 hours PRN Severe Pain (7 - 10) or RR >25  LORazepam   Injectable 0.5 milliGRAM(s) IV Push every 8 hours PRN Anxiety  melatonin 5 milliGRAM(s) Oral at bedtime PRN Insomnia  ondansetron Injectable 4 milliGRAM(s) IV Push every 6 hours PRN Nausea and/or Vomiting    Analgesic Use (Scheduled and PRNs) for past 24 hours:  HYDROmorphone  Injectable   0.5 milliGRAM(s) IV Push (06-10-24 @ 10:07)  LORazepam   Injectable   0.25 milliGRAM(s) IV Push (06-10-24 @ 02:46)  LORazepam   Injectable   0.5 milliGRAM(s) IV Push (06-09-24 @ 23:07)  melatonin   5 milliGRAM(s) Oral (06-09-24 @ 22:24)  omegaven 10 grams/100 mL 60 Gram(s)   50 mL/Hr IV Continuous (06-09-24 @ 17:00)  ondansetron Injectable   4 milliGRAM(s) IV Push (06-09-24 @ 17:23)   4 milliGRAM(s) IV Push (06-09-24 @ 11:13)  venlafaxine XR.   300 milliGRAM(s) Oral (06-10-24 @ 05:27)    ITEMS UNCHECKED ARE NOT PRESENT  PRESENT SYMPTOMS/REVIEW OF SYSTEMS: []Unable to obtain due to poor mentation   Source if other than patient:  []Family   []Team     Pain: [] yes [x] no  QOL impact -  Location -  Aggravating factors -  Quality -  Radiation -  Timing -  Severity (0-10 scale) -  Minimal acceptable level (0-10 scale) -    PAINAD Score: 4  CPOT Score: 3    Dyspnea:                           []Mild  [x]Moderate []Severe  Anxiety:                             [x]Mild []Moderate []Severe  Fatigue:                             []Mild []Moderate []Severe  Nausea:                             []Mild []Moderate []Severe  Loss of appetite:              []Mild []Moderate []Severe  Constipation:                    []Mild []Moderate []Severe    Other Symptoms:  [x]All other review of systems negative     Palliative Performance Status Version 2: 30%    GENERAL:  [] NAD []Alert [x]Lethargic  []Cachexia  []Unarousable  [x]Minimally Verbal  []Non-Verbal  BEHAVIORAL:   []Anxiety  [x]Delirium []Agitation []Cooperative []Oriented x  HEENT:  [x]Normal  [x] Moist Mucous Membranes []Dry mouth   []ET Tube/Trach  []Oral lesions  PULMONARY:   []Clear []Tachypnea  []Audible excessive secretions  []Normal Work of Breathing [x]Labored Breathing  [x]Rhonchi []Crackles []Wheezing  CARDIOVASCULAR:    [x]Regular Rate [x]Regular Rhythm []Irregular []Tachy  []Arie  GASTROINTESTINAL:  [x]Soft  [x]Distended   [x]+BS  []Non tender [x]Tender  []PEG []OGT/ NGT  Last BM: +ileostomy  GENITOURINARY:  []Normal [] Incontinent   [x]Oliguria/Anuria   []Poole  MUSCULOSKELETAL:   []Normal Extremities  [x]Weakness  [x]Bed/Wheelchair bound []Edema  NEUROLOGIC:   []No focal deficits  []Cognitive impairment  []Dysphagia []Dysarthria []Paresis [x]Encephalopathic  SKIN:   []Normal   []Pressure ulcer(s)  [x]Abdominal Wound    Vital Signs Last 24 Hrs  T(C): 36.4 (10 Jozef 2024 08:00), Max: 36.4 (09 Jun 2024 12:00)  T(F): 97.6 (10 Jozef 2024 08:00), Max: 97.6 (10 Jozef 2024 08:00)  HR: 65 (10 Jozef 2024 10:00) (62 - 93)  BP: 98/49 (10 Jozef 2024 10:00) (94/47 - 119/57)  BP(mean): 71 (10 Jozef 2024 10:00) (68 - 82)  RR: 26 (10 Jozef 2024 10:00) (14 - 38)  SpO2: 95% (10 Jozef 2024 10:00) (95% - 100%)    Parameters below as of 10 Jozef 2024 11:00  Patient On (Oxygen Delivery Method): BiPAP/CPAP  O2 Concentration (%): 50    LABS: Personally reviewed and interpreted                      7.6    14.51 )-----------( x        ( 10 Jozef 2024 07:32 )             22.9   06-10    137  |  98  |  149<H>  ----------------------------<  149<H>  4.1   |  27  |  See Note    Ca    8.8      10 Jozef 2024 07:32  Phos  4.7     06-10  Mg     2.7     06-10  TPro  6.6  /  Alb  3.0<L>  /  TBili  15.8<H>  /  DBili  9.2<H>  /  AST  287<H>  /  ALT  33  /  AlkPhos  72  06-10    RADIOLOGY & ADDITIONAL STUDIES: Personally reviewed and interpreted  None new    DISCUSSION OF CASE: Family - to provide updates and emotional support; Primary Team/RN - to discuss plan of care

## 2024-06-10 NOTE — PROGRESS NOTE ADULT - SUBJECTIVE AND OBJECTIVE BOX
ON: Additional Ativan 0.25 for Anxiety     SUBJECTIVE: Pt seen and examined at bedside this am by ICU team. Appears visibly distressed, jaundiced. Has been tolerating ice chips, self limiting diet due to increased bleeding over weekend.       MEDICATIONS  (STANDING):  allopurinol 300 milliGRAM(s) Oral every 24 hours  atorvastatin 20 milliGRAM(s) Oral at bedtime  carvedilol 6.25 milliGRAM(s) Oral every 12 hours  chlorhexidine 2% Cloths 1 Application(s) Topical <User Schedule>  cholestyramine Powder (Sugar-Free) 4 Gram(s) Oral every 24 hours  dextrose 10% Bolus 125 milliLiter(s) IV Bolus once  dextrose 5%. 1000 milliLiter(s) (100 mL/Hr) IV Continuous <Continuous>  dextrose 5%. 1000 milliLiter(s) (50 mL/Hr) IV Continuous <Continuous>  dextrose 50% Injectable 25 Gram(s) IV Push once  dextrose 50% Injectable 12.5 Gram(s) IV Push once  epoetin matt (EPOGEN) Injectable 49864 Unit(s) IV Push every 7 days  ergocalciferol 01321 Unit(s) Oral <User Schedule>  glucagon  Injectable 1 milliGRAM(s) IntraMuscular once  hydrocortisone Enema 100 milliGRAM(s) Rectal every 12 hours  insulin lispro (ADMELOG) corrective regimen sliding scale   SubCutaneous three times a day before meals  insulin lispro (ADMELOG) corrective regimen sliding scale   SubCutaneous at bedtime  levothyroxine Injectable 40 MICROGram(s) IV Push <User Schedule>  lipid, fat emulsion (Fish Oil and Plant Based) 20% Infusion 0.5 Gm/kG/Day (20.83 mL/Hr) IV Continuous <Continuous>  midodrine. 5 milliGRAM(s) Oral every 8 hours  pancrelipase  (CREON 24,000 Lipase Units) 1 Capsule(s) Oral three times a day with meals  Parenteral Nutrition - Adult 1 Each (104 mL/Hr) TPN Continuous <Continuous>  Parenteral Nutrition - Adult 1 Each (104 mL/Hr) TPN Continuous <Continuous>  ursodiol Capsule 300 milliGRAM(s) Oral every 8 hours  venlafaxine XR. 150 milliGRAM(s) Oral every 24 hours    MEDICATIONS  (PRN):  albuterol/ipratropium for Nebulization 3 milliLiter(s) Nebulizer every 6 hours PRN Shortness of Breath and/or Wheezing  dextrose Oral Gel 15 Gram(s) Oral once PRN Blood Glucose LESS THAN 70 milliGRAM(s)/deciliter  HYDROmorphone  Injectable 0.5 milliGRAM(s) IV Push every 4 hours PRN Severe Pain (7 - 10) or RR >25  LORazepam   Injectable 0.5 milliGRAM(s) IV Push every 8 hours PRN Anxiety  melatonin 5 milliGRAM(s) Oral at bedtime PRN Insomnia  ondansetron Injectable 4 milliGRAM(s) IV Push every 6 hours PRN Nausea and/or Vomiting      Drips:     ICU Vital Signs Last 24 Hrs  T(C): 36.4 (10 Jozef 2024 08:00), Max: 36.4 (10 Jozef 2024 08:00)  T(F): 97.6 (10 Jozef 2024 08:00), Max: 97.6 (10 Jozef 2024 08:00)  HR: 64 (10 Jozef 2024 11:00) (62 - 93)  BP: 100/51 (10 Jozef 2024 11:00) (94/47 - 119/57)  BP(mean): 73 (10 Jozef 2024 11:00) (68 - 82)  ABP: --  ABP(mean): --  RR: 16 (10 Jozef 2024 11:00) (14 - 38)  SpO2: 97% (10 Jozef 2024 11:00) (95% - 100%)    O2 Parameters below as of 10 Jozef 2024 12:00  Patient On (Oxygen Delivery Method): BiPAP/CPAP    O2 Concentration (%): 50        General: mildly distressed  HEENT: NC/AT, EOMI, PERRLA, normal hearing, jaundiced  Pulmonary: comfortable on bipap, clear to auscultation b/l  Cardiovascular: NSR, no murmurs  Abdominal: soft, RUQ stoma involuted without stigmata of bleeding; LUQ fistula with serous drainage no bleeding at this time. R fem Cordis in place site c/d/i  Extremities: WWP, pitting edema b/l lower extremity  Neuro: A/O x3, CNs II-XII grossly intact, normal motor/sensation, no focal deficits  Pulses: palpable distal pulses      I&O's Summary    09 Jun 2024 07:01  -  10 Jozef 2024 07:00  --------------------------------------------------------  IN: 3170 mL / OUT: 1375 mL / NET: 1795 mL    10 Jozef 2024 07:01  -  10 Jozef 2024 12:00  --------------------------------------------------------  IN: 566 mL / OUT: 0 mL / NET: 566 mL        LABS:                        7.6    14.51 )-----------( x        ( 10 Jozef 2024 07:32 )             22.9     06-10    137  |  98  |  149<H>  ----------------------------<  149<H>  4.1   |  27  |  See Note    Ca    8.8      10 Jozef 2024 07:32  Phos  4.7     06-10  Mg     2.7     06-10    TPro  6.6  /  Alb  3.0<L>  /  TBili  15.8<H>  /  DBili  9.2<H>  /  AST  287<H>  /  ALT  33  /  AlkPhos  72  06-10    PT/INR - ( 10 Jozef 2024 05:00 )   PT: 17.5 sec;   INR: 1.55          PTT - ( 10 Jozef 2024 05:00 )  PTT:37.1 sec  Urinalysis Basic - ( 10 Jozef 2024 07:32 )    Color: x / Appearance: x / SG: x / pH: x  Gluc: 149 mg/dL / Ketone: x  / Bili: x / Urobili: x   Blood: x / Protein: x / Nitrite: x   Leuk Esterase: x / RBC: x / WBC x   Sq Epi: x / Non Sq Epi: x / Bacteria: x      CAPILLARY BLOOD GLUCOSE      POCT Blood Glucose.: 160 mg/dL (09 Jun 2024 21:37)  POCT Blood Glucose.: 183 mg/dL (09 Jun 2024 16:59)    LIVER FUNCTIONS - ( 10 Jozef 2024 07:32 )  Alb: 3.0 g/dL / Pro: 6.6 g/dL / ALK PHOS: 72 U/L / ALT: 33 U/L / AST: 287 U/L / GGT: x             Cultures:    RADIOLOGY & ADDITIONAL STUDIES:

## 2024-06-10 NOTE — PROGRESS NOTE ADULT - SUBJECTIVE AND OBJECTIVE BOX
Nephrology progress note    Seen at bedside. Resting comfortably on bipap. No acute distress. No complaints offered. Hgb continues to downtrend. sCr previously uptrending, now unable to determine given degree of hyperbilirubinemia. UOP 425cc recorded yesterday with fistula output 700cc, net pos 1.8L overall. SBP 90s-100s.    Allergies:  penicillin (Angioedema)    Hospital Medications:   MEDICATIONS  (STANDING):  allopurinol 300 milliGRAM(s) Oral every 24 hours  atorvastatin 20 milliGRAM(s) Oral at bedtime  carvedilol 6.25 milliGRAM(s) Oral every 12 hours  chlorhexidine 2% Cloths 1 Application(s) Topical <User Schedule>  cholestyramine Powder (Sugar-Free) 4 Gram(s) Oral every 24 hours  dextrose 10% Bolus 125 milliLiter(s) IV Bolus once  dextrose 5%. 1000 milliLiter(s) (100 mL/Hr) IV Continuous <Continuous>  dextrose 5%. 1000 milliLiter(s) (50 mL/Hr) IV Continuous <Continuous>  dextrose 50% Injectable 25 Gram(s) IV Push once  dextrose 50% Injectable 12.5 Gram(s) IV Push once  epoetin matt (EPOGEN) Injectable 88434 Unit(s) IV Push every 7 days  ergocalciferol 34295 Unit(s) Oral <User Schedule>  glucagon  Injectable 1 milliGRAM(s) IntraMuscular once  hydrocortisone Enema 100 milliGRAM(s) Rectal every 12 hours  insulin lispro (ADMELOG) corrective regimen sliding scale   SubCutaneous three times a day before meals  insulin lispro (ADMELOG) corrective regimen sliding scale   SubCutaneous at bedtime  levothyroxine Injectable 40 MICROGram(s) IV Push <User Schedule>  lipid, fat emulsion (Fish Oil and Plant Based) 20% Infusion 0.5 Gm/kG/Day (20.83 mL/Hr) IV Continuous <Continuous>  midodrine. 5 milliGRAM(s) Oral every 8 hours  pancrelipase  (CREON 24,000 Lipase Units) 1 Capsule(s) Oral three times a day with meals  Parenteral Nutrition - Adult 1 Each (104 mL/Hr) TPN Continuous <Continuous>  Parenteral Nutrition - Adult 1 Each (104 mL/Hr) TPN Continuous <Continuous>  ursodiol Capsule 300 milliGRAM(s) Oral every 8 hours  venlafaxine XR. 150 milliGRAM(s) Oral every 24 hours    REVIEW OF SYSTEMS:  All other review of systems is negative unless indicated above.    VITALS:  T(F): 97.6 (06-10-24 @ 08:00), Max: 97.6 (06-10-24 @ 08:00)  HR: 65 (06-10-24 @ 10:00)  BP: 98/49 (06-10-24 @ 10:00)  RR: 26 (06-10-24 @ 10:00)  SpO2: 95% (06-10-24 @ 10:00)  Wt(kg): --    06-08 @ 07:01  -  06-09 @ 07:00  --------------------------------------------------------  IN: 4479 mL / OUT: 3150 mL / NET: 1329 mL    06-09 @ 07:01  -  06-10 @ 07:00  --------------------------------------------------------  IN: 3170 mL / OUT: 1375 mL / NET: 1795 mL    06-10 @ 07:01  -  06-10 @ 11:27  --------------------------------------------------------  IN: 358 mL / OUT: 0 mL / NET: 358 mL        PHYSICAL EXAM:  Constitutional: NAD, lying in bed on bipap  Head: NCAT, EOMI  Respiratory: No resp distress, on BiPAP  Cardiac: Regular rate, no murmur  Gastrointestinal: abdomen nondistended, ileostomy and fistula noted  Extremities: WWP, 2+ LE edema b/l  Dermatologic: skin warm, scattered ecchymoses noted  Neurologic: Somnolent, not significantly interactive    LABS:  06-10    137  |  98  |  149<H>  ----------------------------<  149<H>  4.1   |  27  |  See Note    Ca    8.8      10 Jozef 2024 07:32  Phos  4.7     06-10  Mg     2.7     06-10    TPro  6.6  /  Alb  3.0<L>  /  TBili  15.8<H>  /  DBili  9.2<H>  /  AST  287<H>  /  ALT  33  /  AlkPhos  72  06-10                          7.6    14.51 )-----------( x        ( 10 Jozef 2024 07:32 )             22.9       Urine Studies:  Creatinine Trend: See Note<--, See Note<--, 3.50<--, 3.43<--, 3.31<--, 3.10<--  Urinalysis Basic - ( 10 Jozef 2024 07:32 )    Color:  / Appearance:  / SG:  / pH:   Gluc: 149 mg/dL / Ketone:   / Bili:  / Urobili:    Blood:  / Protein:  / Nitrite:    Leuk Esterase:  / RBC:  / WBC    Sq Epi:  / Non Sq Epi:  / Bacteria:       Sodium, Random Urine: 20 mmol/L (06-08 @ 12:05)  Creatinine, Random Urine: 70 mg/dL (06-08 @ 12:05)  Protein/Creatinine Ratio Calculation: 2.2 Ratio (06-08 @ 12:05)  Osmolality, Random Urine: 398 mosm/kg (06-08 @ 12:05)  Potassium, Random Urine: 68 mmol/L (06-08 @ 12:05)  Sodium, Random Urine: <20 mmol/L (06-08 @ 03:41)  Creatinine, Random Urine: 71 mg/dL (06-08 @ 03:41)  Protein/Creatinine Ratio Calculation: 1.7 Ratio (06-08 @ 03:41)  Osmolality, Random Urine: 392 mosm/kg (06-08 @ 03:41)  Potassium, Random Urine: 68 mmol/L (06-08 @ 03:41)  Sodium, Random Urine: <20 mmol/L (06-07 @ 08:52)  Creatinine, Random Urine: 66 mg/dL (06-07 @ 08:52)  Protein/Creatinine Ratio Calculation: 2.1 Ratio (06-07 @ 08:52)  Osmolality, Random Urine: 423 mosm/kg (06-07 @ 08:52)  Potassium, Random Urine: 67 mmol/L (06-07 @ 08:52)  Osmolality, Random Urine: 477 mosm/kg (06-05 @ 21:01)  Potassium, Random Urine: 65 mmol/L (06-05 @ 21:01)  Sodium, Random Urine: 20 mmol/L (06-05 @ 20:58)  Creatinine, Random Urine: 60 mg/dL (06-05 @ 20:58)  Protein/Creatinine Ratio Calculation: 1.8 Ratio (06-05 @ 20:58)  Sodium, Random Urine: <20 mmol/L (06-05 @ 08:56)  Creatinine, Random Urine: 58 mg/dL (06-05 @ 08:56)  Protein/Creatinine Ratio Calculation: 1.7 Ratio (06-05 @ 08:56)  Osmolality, Random Urine: 430 mosm/kg (06-05 @ 08:56)  Potassium, Random Urine: 50 mmol/L (06-05 @ 08:56)    RADIOLOGY & ADDITIONAL STUDIES:  reviewed

## 2024-06-11 NOTE — PROGRESS NOTE ADULT - SUBJECTIVE AND OBJECTIVE BOX
SUBJECTIVE:  Patient seen and examined by chief resident and team on AM rounds. Patient is somnolent this morning, however opens eyes briefly to physical stimulation, able to squeeze hand to command. As per nurse attendant at the bedside, patient has been somnolent since 11pm yesterday evening. Otherwise patient appears to have an increased WOB, on BIPAP, although satting well. As per [facility] discussion yesterday, patient is now DNR/DNI, and will no longer be receiving blood transfusions. Patient is now comfort care. No further labs, lines will be removed today. No longer receiving TPN.     MEDICATIONS  (STANDING):  allopurinol 300 milliGRAM(s) Oral every 24 hours  carvedilol 6.25 milliGRAM(s) Oral every 12 hours  chlorhexidine 2% Cloths 1 Application(s) Topical <User Schedule>  cholestyramine Powder (Sugar-Free) 4 Gram(s) Oral every 24 hours  epoetin matt (EPOGEN) Injectable 30889 Unit(s) IV Push every 7 days  ergocalciferol 40842 Unit(s) Oral <User Schedule>  levothyroxine Injectable 40 MICROGram(s) IV Push <User Schedule>  midodrine. 5 milliGRAM(s) Oral every 8 hours  ursodiol Capsule 300 milliGRAM(s) Oral every 8 hours  venlafaxine XR. 150 milliGRAM(s) Oral every 24 hours    MEDICATIONS  (PRN):  albuterol/ipratropium for Nebulization 3 milliLiter(s) Nebulizer every 6 hours PRN Shortness of Breath and/or Wheezing  glycopyrrolate Injectable 0.4 milliGRAM(s) IV Push every 4 hours PRN Excessive Secretions  HYDROmorphone  Injectable 0.5 milliGRAM(s) IV Push every 2 hours PRN Severe Pain (7 - 10) or RR >25  LORazepam   Injectable 0.5 milliGRAM(s) IV Push every 4 hours PRN Anxiety  melatonin 5 milliGRAM(s) Oral at bedtime PRN Insomnia  ondansetron Injectable 4 milliGRAM(s) IV Push every 6 hours PRN Nausea and/or Vomiting      Vital Signs Last 24 Hrs  T(C): 36.2 (11 Jun 2024 08:44), Max: 36.7 (10 Jozef 2024 21:38)  T(F): 97.2 (11 Jun 2024 08:44), Max: 98.1 (10 Jozef 2024 21:38)  HR: 85 (11 Jun 2024 06:06) (64 - 85)  BP: 90/51 (11 Jun 2024 06:00) (88/44 - 108/51)  BP(mean): 68 (11 Jun 2024 06:00) (63 - 74)  RR: 22 (11 Jun 2024 06:06) (15 - 37)  SpO2: 96% (11 Jun 2024 06:06) (93% - 99%)    Parameters below as of 11 Jun 2024 06:06  Patient On (Oxygen Delivery Method): BiPAP/CPAP    O2 Concentration (%): 50    PHYSICAL EXAM:  Constitutional: Somnolent, minimally responsive to voice and physical stimulation.   HEENT: MMM  Respiratory: appears to have an increased WOB, on BIPAP, satting well.  Cardiovascular: NSR, RRR  Gastrointestinal: soft, NTND abdomen. Ileostomy site with stigmata of old blood. ECF wound bed w/out stigmata of recent bleed, no fluid in bag. Fistula present pink and well perfused, prolapsed.   Extremities: WWP, + BLE pitting edema  Skin: jaundiced.      I&O's Detail    10 Jozef 2024 07:01  -  11 Jun 2024 07:00  --------------------------------------------------------  IN:    freetext medication - Infusion: 50 mL    TPN (Total Parenteral Nutrition): 932 mL  Total IN: 982 mL    OUT:    Drain (mL): 700 mL    Ileostomy (mL): 0 mL    Voided (mL): 200 mL  Total OUT: 900 mL    Total NET: 82 mL          LABS:                        7.6    14.51 )-----------( 56       ( 10 Jozef 2024 07:32 )             22.9     06-10    137  |  98  |  149<H>  ----------------------------<  149<H>  4.1   |  27  |  See Note    Ca    8.8      10 Jozef 2024 07:32  Phos  4.7     06-10  Mg     2.7     06-10    TPro  6.6  /  Alb  3.0<L>  /  TBili  15.8<H>  /  DBili  9.2<H>  /  AST  287<H>  /  ALT  33  /  AlkPhos  72  06-10    PT/INR - ( 10 Jozef 2024 05:00 )   PT: 17.5 sec;   INR: 1.55          PTT - ( 10 Jozef 2024 05:00 )  PTT:37.1 sec  Urinalysis Basic - ( 10 Jozef 2024 14:42 )    Color: Dark Yellow / Appearance: Turbid / SG: >1.030 / pH: x  Gluc: x / Ketone: Trace mg/dL  / Bili: Large / Urobili: 1.0 mg/dL   Blood: x / Protein: 300 mg/dL / Nitrite: Positive   Leuk Esterase: Small / RBC: 3 /HPF / WBC 4 /HPF   Sq Epi: x / Non Sq Epi: 2 /HPF / Bacteria: Few /HPF        RADIOLOGY & ADDITIONAL STUDIES:

## 2024-06-11 NOTE — PROGRESS NOTE ADULT - PROBLEM SELECTOR PROBLEM 3
Crohn's disease
Acute kidney injury superimposed on CKD
Crohn's disease
Acute kidney injury superimposed on CKD

## 2024-06-11 NOTE — PROGRESS NOTE ADULT - PROBLEM SELECTOR PLAN 5
.  Patient is DNR/DNI, MOLST in chart  -wife is designated HCP  -hospice eligible and appropriate but do not anticipate inpatient hospice transition  -CMOPARIS Exempt  -see GOC note from 6/10

## 2024-06-11 NOTE — PROGRESS NOTE ADULT - PROVIDER SPECIALTY LIST ADULT
Colorectal Surgery
Colorectal Surgery
Hepatology
Hepatology
Internal Medicine
Internal Medicine
Nephrology
Nephrology
SICU
Surgery
Colorectal Surgery
Colorectal Surgery
Internal Medicine
Palliative Care
Palliative Care
SICU
Surgery
Colorectal Surgery
Colorectal Surgery
Hepatology
Hepatology
Internal Medicine
Palliative Care
Pulmonology
SICU
SICU
Surgery
Colorectal Surgery
Hepatology
Hepatology
Internal Medicine
Nephrology
SICU
SICU
Surgery
Surgery

## 2024-06-11 NOTE — CHART NOTE - NSCHARTNOTEFT_GEN_A_CORE
Chart reviewed, patient now transitioned to comfort measures per goals of care discussion with Palliative Care. Further active treatment or aggressive measures such as RRT would not be consistent with patient and family's wishes. Continue symptom directed care to maintain comfort per primary team and Palliative Care. Suggest following regimen for symptoms: Pain, Air hunger - Hydromorphone or fentanyl, avoid codeine, tramadol, gabapentin, or morphine; Nausea and Vomiting - Ondansetron, metoclopramide, haldol; Methotrimeprazine if refractory; Respiratory tract secretions - Glycopyrronium ½ dose, Scopolamine patches; Terminal Agitation - Midazolam, Haloperidol; Dyspnea - Fentanyl or Hydromorphone, Diuretic; Nonmedical – fans, positioning, relaxation. Nephrology will sign off at this time, please reconsult if patient's clinical status changes.

## 2024-06-11 NOTE — PROGRESS NOTE ADULT - PROBLEM SELECTOR PLAN 3
.  Multifactorial, exacerbated by acute liver failure  -poor candidate for RRT/HD, no plans to pursue

## 2024-06-11 NOTE — PROGRESS NOTE ADULT - SUBJECTIVE AND OBJECTIVE BOX
INTERVAL/OVERNIGHT EVENTS: No acute overnight events Continued comfort care after palliative discussion. Right fem cordis removed.    SUBJECTIVE: Pt seen this am at bedside on BiPAP. Pt drowsy, without complaints of pain.      Neurologic Medications  HYDROmorphone  Injectable 1 milliGRAM(s) IV Push every 2 hours PRN Severe Pain (7 - 10) or RR >25  HYDROmorphone  Injectable 1 milliGRAM(s) IV Push once  HYDROmorphone Infusion 0.3 mG/Hr IV Continuous <Continuous>  LORazepam   Injectable 0.5 milliGRAM(s) IV Push every 8 hours  LORazepam   Injectable 0.5 milliGRAM(s) IV Push every 4 hours PRN Anxiety  ondansetron Injectable 4 milliGRAM(s) IV Push every 6 hours PRN Nausea and/or Vomiting  scopolamine 1 mG/72 Hr(s) Patch 1 Patch Transdermal every 72 hours  venlafaxine XR. 150 milliGRAM(s) Oral every 24 hours    Respiratory Medications  albuterol/ipratropium for Nebulization 3 milliLiter(s) Nebulizer every 6 hours PRN Shortness of Breath and/or Wheezing    Gastrointestinal Medications  glycopyrrolate Injectable 0.4 milliGRAM(s) IV Push every 6 hours    Endocrine/Metabolic Medications  levothyroxine Injectable 40 MICROGram(s) IV Push <User Schedule>    Topical/Other Medications      MEDICATIONS  (PRN):  albuterol/ipratropium for Nebulization 3 milliLiter(s) Nebulizer every 6 hours PRN Shortness of Breath and/or Wheezing  HYDROmorphone  Injectable 1 milliGRAM(s) IV Push every 2 hours PRN Severe Pain (7 - 10) or RR >25  LORazepam   Injectable 0.5 milliGRAM(s) IV Push every 4 hours PRN Anxiety  ondansetron Injectable 4 milliGRAM(s) IV Push every 6 hours PRN Nausea and/or Vomiting      I&O's Detail    10 Jozef 2024 07:01  -  11 Jun 2024 07:00  --------------------------------------------------------  IN:    freetext medication - Infusion: 50 mL    TPN (Total Parenteral Nutrition): 932 mL  Total IN: 982 mL    OUT:    Drain (mL): 700 mL    Ileostomy (mL): 0 mL    Voided (mL): 200 mL  Total OUT: 900 mL    Total NET: 82 mL      11 Jun 2024 07:01  -  11 Jun 2024 14:47  --------------------------------------------------------  IN:  Total IN: 0 mL    OUT:    Drain (mL): 0 mL    Ileostomy (mL): 0 mL  Total OUT: 0 mL    Total NET: 0 mL          Vital Signs Last 24 Hrs  T(C): 36.2 (11 Jun 2024 08:44), Max: 36.7 (10 Jozef 2024 21:38)  T(F): 97.2 (11 Jun 2024 08:44), Max: 98.1 (10 Jozef 2024 21:38)  HR: 0 (11 Jun 2024 14:22) (0 - 85)  BP: 54/24 (11 Jun 2024 14:00) (54/24 - 102/50)  BP(mean): 34 (11 Jun 2024 14:00) (34 - 72)  RR: 0 (11 Jun 2024 14:22) (0 - 37)  SpO2: 52% (11 Jun 2024 14:20) (52% - 99%)    Parameters below as of 11 Jun 2024 06:06  Patient On (Oxygen Delivery Method): BiPAP/CPAP    O2 Concentration (%): 50    GENERAL: NAD, resting comfortably in bed  HEENT: NCAT, MMM  C/V: Normal rate, regular rhythm. normal peripheral perfusion  PULM: Nonlabored breathing, no respiratory distress, on BiPAP   ABD: Soft, ND, NT, no rebound tenderness, no guarding. Ileostomy with liquid brown output. Enteric fistula with serious output.   EXTREM: WWP, bilateral upper and lower extremity edema  NEURO: No focal deficits    LABS:                        7.6    14.51 )-----------( 56       ( 10 Jozef 2024 07:32 )             22.9     06-10    137  |  98  |  149<H>  ----------------------------<  149<H>  4.1   |  27  |  See Note    Ca    8.8      10 Jozef 2024 07:32  Phos  4.7     06-10  Mg     2.7     06-10    TPro  6.6  /  Alb  3.0<L>  /  TBili  15.8<H>  /  DBili  9.2<H>  /  AST  287<H>  /  ALT  33  /  AlkPhos  72  06-10    PT/INR - ( 10 Jozef 2024 05:00 )   PT: 17.5 sec;   INR: 1.55          PTT - ( 10 Jozef 2024 05:00 )  PTT:37.1 sec  Urinalysis Basic - ( 10 Jozef 2024 14:42 )    Color: Dark Yellow / Appearance: Turbid / SG: >1.030 / pH: x  Gluc: x / Ketone: Trace mg/dL  / Bili: Large / Urobili: 1.0 mg/dL   Blood: x / Protein: 300 mg/dL / Nitrite: Positive   Leuk Esterase: Small / RBC: 3 /HPF / WBC 4 /HPF   Sq Epi: x / Non Sq Epi: 2 /HPF / Bacteria: Few /HPF        RADIOLOGY & ADDITIONAL STUDIES:

## 2024-06-11 NOTE — PROGRESS NOTE ADULT - PROBLEM SELECTOR PROBLEM 5
Advanced care planning/counseling discussion
Encounter for palliative care
Afib
Advanced care planning/counseling discussion

## 2024-06-11 NOTE — PROGRESS NOTE ADULT - PROBLEM SELECTOR PLAN 1
.  -ordered Dilaudid gtt at 0.3mg/hr  -Dilaudid 1mg IV q2h PRN for Moderate/Severe Pain or RR>22  -Ativan 0.5mg IV q8h ATC  -Ativan 0.5mg IV q4h PRN for Anxiety/Agitation  -Robinul 0.4mg IV q6h ATC
MSSA bacteremia from previous hospitalization, line associated. BCX+ 5/9. RUE PICC was removed and replaced. Pt was started on Cefazolin 2g with expected course 5/9.  - C/w Cefazolin 2g q8h 4 week total duration (EOT 6/8/24) per previous ID recs  - surveillance cultures 5/29, no growth
MSSA bacteremia from previous hospitalization, line associated. BCX+ 5/9. RUE PICC was removed and replaced. Pt was started on Cefazolin 2g with expected course 5/9.  - C/w Cefazolin 2g q8h 4 week total duration (EOT 6/8/24) per previous ID recs  - Case surveillance cultures 5/29, f/u growth
.  -c/w scheduled Zofran 4mg IV q6h ATC
MSSA bacteremia from previous hospitalization, line associated. BCX+ 5/9. RUE PICC was removed and replaced. Pt was started on Cefazolin 2g with expected course 5/9.  - C/w Cefazolin 2g q8h 4 week total duration (EOT 6/8/24) per previous ID recs  - surveillance cultures 5/29, no growth
.  -Dilaudid 0.5mg IV q2h PRN for Moderate/Severe Pain or RR>22  -Ativan 0.5mg IV q4h PRN for Anxiety/Agitation  -Robinul 0.4mg IV q6h PRN for Excessive Secretions

## 2024-06-11 NOTE — PROGRESS NOTE ADULT - ASSESSMENT
79yo M with PMH of Crohn's, AFib, CKD, Enterocutaneous Fistula, and multiple long complicated hospital courses p/w bleeding and now with multi-organ failure. Palliative consulted for complex medical decision making in the setting of critical illness.    ·	ordered Dilaudid gtt at 0.3mg/hr  ·	Dilaudid 1mg IV q2h PRN for Moderate/Severe Pain or RR>22  ·	Ativan 0.5mg IV q8h ATC  ·	Robinul 0.4mg IV q6h ATC  ·	discontinued unnecessary oral meds    Discussed with wife, son, and team at bedside 79yo M with PMH of Crohn's, AFib, CKD, Enterocutaneous Fistula, and multiple long complicated hospital courses p/w bleeding and now with multi-organ failure. Palliative consulted for complex medical decision making in the setting of critical illness.    ·	ordered Dilaudid gtt at 0.3mg/hr  ·	Dilaudid 1mg IV q2h PRN for Moderate/Severe Pain or RR>22  ·	Ativan 0.5mg IV q8h ATC  ·	Robinul 0.4mg IV q6h ATC  ·	discontinued unnecessary oral meds  ·	if symptoms are better managed with the above changes, then would wean BIPAP off but wait as to not exacerbate dyspnea    Discussed with wife, son, and team at bedside

## 2024-06-11 NOTE — DISCHARGE NOTE FOR THE EXPIRED PATIENT - HOSPITAL COURSE
The patient is a 78 year old male with a PMHx of AFib/Flutter with numerous DCCVs in the past with prolonged admission from 23 to 24 for SBO due to active Crohn's disease s/p laparoscopic TRE converted to open TRE with SBR x 3, temporary abdominal closure, planned return to OR for ileocolic resection with anastamosis, small bowel anastomosis, myocutaneous flaps with abdominal wall reconstruction with stratis tissue matrix, and abdominal washout c/b abdominal wall dehiscence s/p abdominal washout, primary repair of enterotomoty, creation of end ileostomy and blow hole colostomy with long Franc's pouch, abdominal wall closed with vicryl mesh to bridge fascia further complicated by 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 Patient has had prolonged hospital course significant for recurrent AKIs, cholestatic transaminitis s/p Percutaneous transhepatic cholecystostomy (23 - 24), prior DVTs, PNA, fungemia, sinus pause requiring pacemaker placement, Micra AV implant (24). Patient returned to hospital man times after discharge for recurrent bleeding from ileostomy, fistula, and wound bed. He underwent IR embolization of L branch of inferior epigastric artery via R groin access (), underwent ileoscopy, found to have ulcer with clot proximal to stoma s/p hemoclip (). However, patient had persistent bleeding from fistula site, up to 2L at once. He underwent angiography with IR without active extravasation (), s/p repeat ileoscopy with erythematous patch of bowel just deep to the ECF, without any active bleeding (). He was found to have MSSA bacteremia () likely due to line sepsis/PICC infection, started on Ancef. Also found to have portal vein thrombosis on  however not a candidate for anticoagulation due to persistent bleeding. Patient was transferred from [facility] to [facility] on  for possible surgical intervention for persistent bleeding and high output fistula, however underwent preoperative IR liver biopsy and found to have portal hypertension and thus was no longer a surgical candidate. Patient was transferred back to [facility] for continued management of bleeding, primarily from wound bed secondary to clot removal and tissue irritation from enteric content after PO intake. Recieved numerous blood transfusions due to persistent bleeding. Deemed not a candidate for TIPS procedure or further IR/endoscopic/surgical intervention due to clinical status. Overall course complicated by total body overload, new onset kidney failure likely secondary to hepatorenal syndrome from portal hypertension. 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 Palliative conversations had been ongoing and ultimately patient was made DNR/DNI with comfort measures in place. Discontinued further blood transfusions, TPN, labs. Will not initiate pressors.   Patient apneic with pulseless electrical activity on monitor. At 2:24 PM patient was declared  per cardiopulmonary criteria.

## 2024-06-11 NOTE — PROGRESS NOTE ADULT - PROBLEM SELECTOR PROBLEM 2
Functional quadriplegia
Portal hypertension
Functional quadriplegia
Functional quadriplegia
Portal hypertension
Portal hypertension

## 2024-06-11 NOTE — PROGRESS NOTE ADULT - PROBLEM SELECTOR PROBLEM 6
CKD (chronic kidney disease)
Encounter for palliative care
CKD (chronic kidney disease)
CKD (chronic kidney disease)
Encounter for palliative care

## 2024-06-11 NOTE — PROGRESS NOTE ADULT - REASON FOR ADMISSION
Symptomatic Anemia

## 2024-06-11 NOTE — PROGRESS NOTE ADULT - PROBLEM SELECTOR PLAN 4
.  Complicated by ECF, non-healing wound, and recurrent bleeding  -no further surgical interventions possible  -no further transfusions

## 2024-06-11 NOTE — PROGRESS NOTE ADULT - ASSESSMENT
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 Prolonged hospital course significant for recurrent AKIs, cholestatic transaminitis s/p perc cony, prior DVTs, PNAs, fungemia, sinus pauses s/p pacemaker, return to [facility] multiple times after discharge in Feb2024 for recurrent bleeding from ileostomy, fistula and wound bed, most recently s/p IR embolization of L branch of inferior epigastric artery via R groin access (4/16), S/p ileoscopy, found an ulcer w/ clot proximal to stoma w/ hemoclip applied (5/1). most recently transferred to SICU 5/8 overnight for 2L blood loss from fistula site with subsequent hypotension. s/p ileoscopy, noting erythematous patch of bowel just deep to the ECF, without any active bleeding (5/9). S/p IR angiography noting several areas of hyperemia in the jejunum adjacent to the stoma however no evidence of active bleeding (5/9), also noted MSSA bacteremia (5/9) likely due to line sepsis/PICC infection, started on Ancef, portal vein thrombosis per US from 5/13/24, but not candidate for anticoagulation due to persistent bleeding. Ultimately transferred from [facility] to [facility] 5/20 for possible surgical intervention for persist bleeding, but deemed not candidate for surgery due to portal hypertension. pt readmitted to [facility] Medicine tele on 5/29 for fistula wound bed bleeding. s/p PRBC transfusion. Pt transferred from [facility] to SICU on 6/1 for persistent fistula wound bed bleeding with acute blood loss anemia for close management. With total body overload, s/p L thoracentesis (6/4).     Neuro: Pain: Dilaudid PRN. Hx: Anxiety: Continue Effexor and Ativan PRN. Ambien on hold. Nausea: Zofran PRN.  CV: maintaining MAPs > 65 without pressors. Hx AFib: cont coreg, no AC given recurrent unprovoked bleed. Hx HLD: cont Atorvastatin. Total body overload - continue 25% albumin. Weekly Epogen.  Pulm: Pleural Effusions b/l, no resp distress on Room air, nightly BIPAP at 40%. POCUS as needed, L thoracentesis (6/4).   GI: Reg Diet. persistent elevated T bili s/p Hepatology consults and workup in past, US with portal vein thrombosis 5/13, but unable to anticoagulate due to recurrent bleeding. Cont cholestyramine. C/w Ursodiol. Cont Creon. cont octreotide (in TPN), Crohns: cont hydrocortisone enemas via ileostomy, Continue TPN, smofflipids on Monday, Omegaven on Tues to Sunday.  holding off on remicade in setting of MSSA bacteremia. Dr Zhao consulted on interventional options for portal htn.   : Voids. BUN/Cr uptrending, Renal consult - suspecting hepatorenal syndrome. Starting midodrine 5q8 for portal HTN and hepatorenal. Pending repeat UA/lytes for possible diuresis with worsening Cr  ID: Not currently on abx. Prior MSSA Bacteremia from BCx (5/9), TTE negative for vegetation. 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 // PREVIOUS: Cefepime (5/10), Vanc x1 (5/9)  Endo: mISSc. Hx: Hypothyroidism. Continue synthroid. insulin in TPN, watch fingerstick.   PPx: SCDs, SQH on hold for bleeding   Heme: Acute blood loss anemia, likely due to EC fistula area bleed. Received multiple PRBCs, FFP, plt and cryo in recent admission. seen by Leticia in recent admission. 6/9 EBL 1L from fistula, given 3u prbc, 1 FFP, 1 platlet, 1 cryo.  Wounds: Wound manager managed by Wound care team.   Lines: R fem cordis (6/9-6/11), double lumen L PICC (5/17--)  PT: OOBTC daily.   Dispo: SICU

## 2024-06-11 NOTE — PROGRESS NOTE ADULT - SUBJECTIVE AND OBJECTIVE BOX
[facility] Geriatrics and Palliative Care  Andrea Harper, Palliative Care Attending  Contact Info: Call [phone] (HEAL Line) or message on Microsoft Teams (Andrea Harper)    SUBJECTIVE AND OBJECTIVE:  INTERVAL HPI/OVERNIGHT EVENTS: Interval events noted. Less responsive. More tachypneic overnight requiring frequent PRNs. See patient's PRN use for the past 24hrs noted below. Comprehensive symptom assessment and GOC exploration as noted below. Extensive time spent discussing plan of care with family. No unexpected adverse effects of opiates noted.    ALLERGIES:  penicillin (Angioedema)    MEDICATIONS  (STANDING):  chlorhexidine 2% Cloths 1 Application(s) Topical <User Schedule>  glycopyrrolate Injectable 0.4 milliGRAM(s) IV Push every 6 hours  HYDROmorphone Infusion 0.3 mG/Hr (1.5 mL/Hr) IV Continuous <Continuous>  levothyroxine Injectable 40 MICROGram(s) IV Push <User Schedule>  LORazepam   Injectable 0.5 milliGRAM(s) IV Push every 8 hours  venlafaxine XR. 150 milliGRAM(s) Oral every 24 hours    MEDICATIONS  (PRN):  albuterol/ipratropium for Nebulization 3 milliLiter(s) Nebulizer every 6 hours PRN Shortness of Breath and/or Wheezing  HYDROmorphone  Injectable 1 milliGRAM(s) IV Push every 2 hours PRN Severe Pain (7 - 10) or RR >25  LORazepam   Injectable 0.5 milliGRAM(s) IV Push every 4 hours PRN Anxiety  ondansetron Injectable 4 milliGRAM(s) IV Push every 6 hours PRN Nausea and/or Vomiting      Analgesic Use (Scheduled and PRNs) for past 24 hours:    HYDROmorphone  Injectable   0.5 milliGRAM(s) IV Push (06-11-24 @ 10:47)   0.5 milliGRAM(s) IV Push (06-11-24 @ 02:50)   0.5 milliGRAM(s) IV Push (06-10-24 @ 23:12)   0.5 milliGRAM(s) IV Push (06-10-24 @ 17:00)    LORazepam   Injectable   0.5 milliGRAM(s) IV Push (06-11-24 @ 09:23)   0.5 milliGRAM(s) IV Push (06-11-24 @ 02:32)    LORazepam   Injectable   0.5 milliGRAM(s) IV Push (06-10-24 @ 14:20)    ondansetron Injectable   4 milliGRAM(s) IV Push (06-10-24 @ 17:00)      ITEMS UNCHECKED ARE NOT PRESENT  PRESENT SYMPTOMS/REVIEW OF SYSTEMS: [x]Unable to obtain due to poor mentation   Source if other than patient:  [x]Family   [x]Team     Pain: [] yes [x] no - see PAINAD  QOL impact -  Location -  Aggravating factors -  Quality -  Radiation -  Timing -  Severity (0-10 scale) -  Minimal acceptable level (0-10 scale) -    PAINAD Score: 3    Dyspnea:                           []Mild  [x]Moderate []Severe  Anxiety:                             [x]Mild []Moderate []Severe  Fatigue:                             []Mild []Moderate []Severe  Nausea:                             []Mild []Moderate []Severe  Loss of appetite:              []Mild []Moderate []Severe  Constipation:                    []Mild []Moderate []Severe    Other Symptoms:  [x]All other review of systems negative     Palliative Performance Status Version 2: 20%    GENERAL:  [] NAD []Alert [x]Lethargic  []Cachexia  []Unarousable  [x]Minimally Verbal  []Non-Verbal  BEHAVIORAL:   []Anxiety  [x]Delirium []Agitation []Cooperative []Oriented x  HEENT:  [x]Normal  [x] Moist Mucous Membranes []Dry mouth   []ET Tube/Trach  []Oral lesions  PULMONARY:   []Clear []Tachypnea  []Audible excessive secretions  []Normal Work of Breathing [x]Labored Breathing  [x]Rhonchi []Crackles []Wheezing  CARDIOVASCULAR:    [x]Regular Rate [x]Regular Rhythm []Irregular []Tachy  []Arie  GASTROINTESTINAL:  [x]Soft  [x]Distended   [x]+BS  []Non tender [x]Tender  []PEG []OGT/ NGT  Last BM: +ileostomy  GENITOURINARY:  []Normal [] Incontinent   [x]Oliguria/Anuria   []Poole  MUSCULOSKELETAL:   []Normal Extremities  [x]Weakness  [x]Bed/Wheelchair bound []Edema  NEUROLOGIC:   []No focal deficits  []Cognitive impairment  []Dysphagia []Dysarthria []Paresis [x]Encephalopathic  SKIN:   []Normal   []Pressure ulcer(s)  [x]Abdominal Wound  Vital Signs Last 24 Hrs  T(C): 36.2 (11 Jun 2024 08:44), Max: 36.7 (10 Jozef 2024 21:38)  T(F): 97.2 (11 Jun 2024 08:44), Max: 98.1 (10 Jozef 2024 21:38)  HR: 83 (11 Jun 2024 11:57) (65 - 85)  BP: 84/46 (11 Jun 2024 10:00) (84/46 - 102/51)  BP(mean): 63 (11 Jun 2024 10:00) (63 - 74)  RR: 22 (11 Jun 2024 10:00) (15 - 37)  SpO2: 94% (11 Jun 2024 11:57) (93% - 99%)    Parameters below as of 11 Jun 2024 06:06  Patient On (Oxygen Delivery Method): BiPAP/CPAP  O2 Concentration (%): 50    LABS: Personally reviewed and interpreted                      7.6    14.51 )-----------( 56       ( 10 Jozef 2024 07:32 )             22.9   06-10    137  |  98  |  149<H>  ----------------------------<  149<H>  4.1   |  27  |  See Note    Ca    8.8      10 Jozef 2024 07:32  Phos  4.7     06-10  Mg     2.7     06-10  TPro  6.6  /  Alb  3.0<L>  /  TBili  15.8<H>  /  DBili  9.2<H>  /  AST  287<H>  /  ALT  33  /  AlkPhos  72  06-10    RADIOLOGY & ADDITIONAL STUDIES: Personally reviewed and interpreted  None new    DISCUSSION OF CASE: Family - to provide updates and emotional support; Primary Team/RN - to discuss plan of care

## 2024-06-11 NOTE — PROGRESS NOTE ADULT - NS ATTEST RISK PROBLEM GEN_ALL_CORE FT
Acute Illness That Poses A Threat To Life  Abrupt Change In Neurological Status  Chronic Illness With Severe Exacerbation/Progression  Decision Made To De-escalate Care Due To Poor Prognosis  Prescription Of Parenteral Controlled Substances
Acute Illness That Poses A Threat To Life  Abrupt Change In Neurological Status  Chronic Illness With Severe Exacerbation/Progression  Decision Made To Not Resuscitate (DNR)  Decision Made To De-escalate Care Due To Poor Prognosis  Prescription Of Parenteral Controlled Substances

## 2024-06-11 NOTE — PROGRESS NOTE ADULT - ASSESSMENT
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 Prolonged hospital course significant for recurrent AKIs, cholestatic transaminitis s/p perc cony, prior DVTs, PNAs, fungemia, sinus pauses s/p pacemaker, return to [facility] multiple times after discharge in Feb2024 for recurrent bleeding from ileostomy, fistula and wound bed, most recently s/p IR embolization of L branch of inferior epigastric artery via R groin access (4/16), S/p ileoscopy, found an ulcer w/ clot proximal to stoma w/ hemoclip applied (5/1). most recently transferred to SICU 5/8 overnight for 2L blood loss from fistula site with subsequent hypotension. s/p ileoscopy, noting erythematous patch of bowel just deep to the ECF, without any active bleeding (5/9). S/p IR angiography noting several areas of hyperemia in the jejunum adjacent to the stoma however no evidence of active bleeding (5/9), also noted MSSA bacteremia (5/9) likely due to line sepsis/PICC infection, started on Ancef, portal vein thrombosis per US from 5/13/24, but not candidate for anticoagulation due to persistent bleeding. Ultimately transferred from [facility] to [facility] 5/20 for possible surgical intervention for persist bleeding, but deemed not candidate for surgery due to portal hypertension. pt readmitted to [facility] Medicine tele on 5/29 for fistula wound bed bleeding, s/p PRBC transfusion. Pt transferred from [facility] to SICU on 6/1 for persistent fistula wound bed bleeding with acute blood loss anemia for close management. With total body overload, s/p L thoracentesis (6/4). Hospital course c/b recurrent bleeding from surrounding the ECF site, with frequent transfusion requirements. Goals of care were discussed with family members, patient is now DNR/DNI, and will no longer undergo blood transfusions. Patient is now comfort care.     Recommendations:  - Appreciate palliative care recommendations   - C/w comfort care   Plan discussed with chief resident and attending, Dr. Tobar

## 2024-06-11 NOTE — PROGRESS NOTE ADULT - PROBLEM SELECTOR PROBLEM 1
Dyspnea and respiratory abnormalities
Nausea
MSSA bacteremia
MSSA bacteremia
Dyspnea and respiratory abnormalities
MSSA bacteremia

## 2024-06-11 NOTE — PROGRESS NOTE ADULT - PROBLEM SELECTOR PLAN 6
.  Complex medical decision making / symptom management in the setting of critical illness.    Will continue to follow for ongoing GOC discussion / titration of palliative regimen. Emotional support provided, questions answered.  Active Psychosocial Referrals:  [x]Social Work/Case management []PT/OT []Chaplaincy []Hospice  []Patient/Family Support []Holistic RN [x]Massage Therapy []Music Therapy []Ethics  Coping: [] well [] with difficulty [] poor coping [x] unable to assess  Support system: [x] strong [] adequate [] inadequate    For new or uncontrolled symptoms, please call Palliative Care at [phone]). The service is available 24/7 (including nights & weekends) to provide symptom management recommendations over the phone as appropriate

## 2024-06-12 DIAGNOSIS — R11.0 NAUSEA: ICD-10-CM

## 2024-06-17 NOTE — PRE-ANESTHESIA EVALUATION ADULT - NSANTHAPLANRD_GEN_ALL_CORE
general
Opt out
general
monitored anesthesia care (MAC)
monitored anesthesia care (MAC)

## 2024-06-19 DIAGNOSIS — E03.9 HYPOTHYROIDISM, UNSPECIFIED: ICD-10-CM

## 2024-06-19 DIAGNOSIS — E55.9 VITAMIN D DEFICIENCY, UNSPECIFIED: ICD-10-CM

## 2024-06-19 DIAGNOSIS — I48.20 CHRONIC ATRIAL FIBRILLATION, UNSPECIFIED: ICD-10-CM

## 2024-06-19 DIAGNOSIS — M10.9 GOUT, UNSPECIFIED: ICD-10-CM

## 2024-06-19 DIAGNOSIS — F41.9 ANXIETY DISORDER, UNSPECIFIED: ICD-10-CM

## 2024-06-19 DIAGNOSIS — J90 PLEURAL EFFUSION, NOT ELSEWHERE CLASSIFIED: ICD-10-CM

## 2024-06-19 DIAGNOSIS — Z86.718 PERSONAL HISTORY OF OTHER VENOUS THROMBOSIS AND EMBOLISM: ICD-10-CM

## 2024-06-19 DIAGNOSIS — I12.9 HYPERTENSIVE CHRONIC KIDNEY DISEASE WITH STAGE 1 THROUGH STAGE 4 CHRONIC KIDNEY DISEASE, OR UNSPECIFIED CHRONIC KIDNEY DISEASE: ICD-10-CM

## 2024-06-19 DIAGNOSIS — K94.11 ENTEROSTOMY HEMORRHAGE: ICD-10-CM

## 2024-06-19 DIAGNOSIS — K76.6 PORTAL HYPERTENSION: ICD-10-CM

## 2024-06-19 DIAGNOSIS — R78.81 BACTEREMIA: ICD-10-CM

## 2024-06-19 DIAGNOSIS — E78.5 HYPERLIPIDEMIA, UNSPECIFIED: ICD-10-CM

## 2024-06-19 DIAGNOSIS — K80.20 CALCULUS OF GALLBLADDER WITHOUT CHOLECYSTITIS WITHOUT OBSTRUCTION: ICD-10-CM

## 2024-06-19 DIAGNOSIS — Z88.0 ALLERGY STATUS TO PENICILLIN: ICD-10-CM

## 2024-06-19 DIAGNOSIS — R11.0 NAUSEA: ICD-10-CM

## 2024-06-19 DIAGNOSIS — D62 ACUTE POSTHEMORRHAGIC ANEMIA: ICD-10-CM

## 2024-06-19 DIAGNOSIS — F32.9 MAJOR DEPRESSIVE DISORDER, SINGLE EPISODE, UNSPECIFIED: ICD-10-CM

## 2024-06-19 DIAGNOSIS — Q84.5 ENLARGED AND HYPERTROPHIC NAILS: ICD-10-CM

## 2024-06-19 DIAGNOSIS — Z66 DO NOT RESUSCITATE: ICD-10-CM

## 2024-06-19 DIAGNOSIS — R57.8 OTHER SHOCK: ICD-10-CM

## 2024-06-19 DIAGNOSIS — Z51.5 ENCOUNTER FOR PALLIATIVE CARE: ICD-10-CM

## 2024-06-19 DIAGNOSIS — G93.40 ENCEPHALOPATHY, UNSPECIFIED: ICD-10-CM

## 2024-06-19 DIAGNOSIS — B95.61 METHICILLIN SUSCEPTIBLE STAPHYLOCOCCUS AUREUS INFECTION AS THE CAUSE OF DISEASES CLASSIFIED ELSEWHERE: ICD-10-CM

## 2024-06-19 DIAGNOSIS — I81 PORTAL VEIN THROMBOSIS: ICD-10-CM

## 2024-06-19 DIAGNOSIS — K50.90 CROHN'S DISEASE, UNSPECIFIED, WITHOUT COMPLICATIONS: ICD-10-CM

## 2024-06-19 DIAGNOSIS — K76.7 HEPATORENAL SYNDROME: ICD-10-CM

## 2024-06-19 DIAGNOSIS — N18.30 CHRONIC KIDNEY DISEASE, STAGE 3 UNSPECIFIED: ICD-10-CM

## 2024-06-19 DIAGNOSIS — N17.9 ACUTE KIDNEY FAILURE, UNSPECIFIED: ICD-10-CM

## 2024-06-19 DIAGNOSIS — Z79.890 HORMONE REPLACEMENT THERAPY: ICD-10-CM

## 2024-07-09 NOTE — PROGRESS NOTE ADULT - SUBJECTIVE AND OBJECTIVE BOX
78M with history of Crohn's, AFib/Flutter s/p DCCVs on amiodarone, remote ileocectomy and open appendectomy. Admitted (6/23) for SBO vs Crohns flare, s/p NGT decompression and s/p lap TRE converted to open TRE, SBR x 3, left in discontinuity with abthera vac on (6/27), RTOR for ileocolic resection, small bowel anastomosis, and abdominal wall closure on (6/28), c/b fluid collection s/p IR aspiration of perihepatic fluid on (7/3), c/b wound dehiscence s/p RTOR exlap, washout, ileocolic resection with end ileostomy, blow hole colostomy, red rubber from ileostomy to small bowel anastomosis; vicryl bridging mesh on (7/5) transferred to SICU postoperatively for hemodynamic monitoring, with hospital course complicated by periods of AMS most recently on 9/1 and associated with oliguria, severe ELVI and hyponatremia, which resolved but stepped back up for to SICU on 9/10 for acute AMS, intermittent hypoglycemia, AFib with RVR. Percutaneous Cholecystostomy placed on 9/11 for enlarged GB, capped 10/5, and connected to drainage bag 10/25.    Pt seen bedside, OOB sitting in chair. No complaints.   Percutaneous cholecystostomy tube with 875 cc bilious output past 24 hr, previously 455 cc/24h. Flushed easily with 3 cc NS. Dressings CDI.  Continue to monitor output.  Patient: Jose Maria Judge    Procedure Summary       Date: 07/09/24 Room / Location: [facility] MRI; [facility] INTERVENTIONAL    Anesthesia Start: 1350 Anesthesia Stop: 1447    Procedure: MRI LUMBAR SPINE W WO CONTRAST Diagnosis:       Chronic bilateral low back pain, unspecified whether sciatica present      (worsening low back pain)    Scheduled Providers:  Provider: Singh Pino MD    Anesthesia Type: MAC ASA Status: 2            Anesthesia Type: MAC    Vitals  Vitals Value Taken Time   /84 07/09/24 1545   Temp     Pulse 60 07/09/24 1545   Resp 17 07/09/24 1545   SpO2 96 % 07/09/24 1545           Post Anesthesia Care and Evaluation    Pain management: adequate    Airway patency: patent  Anesthetic complications: No anesthetic complications    Cardiovascular status: acceptable  Respiratory status: acceptable  Hydration status: acceptable    Comments: */84 (BP Location: Right arm, Patient Position: Sitting)   Pulse 60   Temp 36.5 °C (97.7 °F) (Temporal)   Resp 17   SpO2 96%

## 2024-07-26 NOTE — DISCHARGE NOTE PROVIDER - NSDCHHBASESERVICE_GEN_ALL_CORE
Anesthesia Pre Eval Note    Anesthesia ROS/Med Hx    Overall Review:  EKG was reviewed     Anesthetic Complication History:    Patient does not have a history of anesthetic complications      Pulmonary Review:    Positive for COPD   The patient is not a smoker.    Neuro/Psych Review:       Positive for headaches  Positive for psychiatric history - Depression    Cardiovascular Review:   Exercise tolerance: good (>4 METS)  Positive for CAD    Positive for cardiac stents (years ago)  Positive for hypertension  Positive for hyperlipidemia    GI/HEPATIC/RENAL Review:     Positive for GERD    End/Other Review:  Positive for diabetes - type 2  Positive for hypothyroidism  Positive for anemia  Positive for arthritis  Positive for cancer (skin)  Additional Results:      ALLERGIES:   -- Ace Inhibitors -- DIZZINESS and Other (See Comments)    --  Severe dizziness, light headed   -- Cilostazol -- Palpitations    --  headache   -- Ketoconazole -- RASH   Past Medical History:  No date: Anemia  No date: Carotid artery disease      Comment:  5/2010 Cath: Chronic, total occlusion of the left                external carotid artery. Severe stenosis of the right                internal carotid artery at its origin with                circumferential calcification.  6/2010:                Angioplasty/stenting of the right internal carotid artery               using a distal protection device.    12/2010 Carotid US:  No date: COPD (chronic obstructive pulmonary disease)  No date: GERD (gastroesophageal reflux disease)  No date: Hyperlipidemia  No date: Hypertension  No date: Hypothyroidism  No date: Major depressive disorder  No date: Pancreatitis, acute (CMD)  No date: Peripheral arterial disease  No date: Peripheral arterial occlusive disease      Comment:  8/2010: Atherectomy (rotational) branches of the left                superficial femoral artery at 2 sites.  Adjunctive                angioplasty of the left superficial femoral  artery at 2                sites.  Atherectomy, (rotational) the left common femoral               artery.  Adjunctive angioplasty of the left common                femoral artery.  No date: Peripheral neuropathy  No date: Type 2 diabetes mellitus  Past Surgical History:  09/05/1995: Bypass graft      Comment:  Left carotid endarterectomy, right carotid stent  01/14/2019: Cataract extraction w/  intraocular lens implant; Right      Comment:  Dr. Flynn  02/11/2019: Cataract extraction w/ intraocular lens implant; Left      Comment:  Dr. Flynn  No date: Cholecystectomy  06/03/2016: Colonoscopy w biopsy      Comment:  tubular adenoma  10/14/2013: Ercp,sphincterotomy  07/2023: Popliteal artery angioplasty      Comment:  left superficial femoral and popliteal artery                angioplasty, Dr. Abreu  No date: Ptca with stent      Comment:  six  10/14/2013: Repair bleed liver/suture wound  11/14/2011: Repr bl ves graft,low extrem; Left      Comment:  ANGIOPLASTY PATCH  01/29/2018: Repr bl ves graft,low extrem; Right      Comment:  Femoral Artery  11/14/2011: Thromboendartectomy femoral artery; Left  01/29/2018: Thromboendartectomy femoral artery; Right  No date: Umbilical hernia repair   Prior to Admission medications :  Medication VITAMIN D PO, Sig Take 1,000 mg by mouth in the morning and 1,000 mg in the evening., Start Date , End Date , Taking? Yes, Authorizing Provider Provider, Outside    Medication Cyanocobalamin (VITAMIN B-12 PO), Sig Take 1,000 mcg by mouth daily., Start Date , End Date , Taking? Yes, Authorizing Provider Provider, Outside    Medication Ferrous Sulfate (Iron) 325 (65 Fe) MG Tab, Sig Take 1 tablet by mouth daily., Start Date , End Date , Taking? Yes, Authorizing Provider Provider, Outside    Medication traZODone (DESYREL) 50 MG tablet, Sig Take 3 tablets by mouth at bedtime., Start Date 4/5/24, End Date , Taking? Yes, Authorizing Provider Provider, Outside    Medication pantoprazole  (PROTONIX) 40 MG tablet, Sig Take 40 mg by mouth daily., Start Date 4/5/24, End Date , Taking? Yes, Authorizing Provider Provider, Outside    Medication losartan (COZAAR) 25 MG tablet, Sig Take 12.5 mg by mouth daily., Start Date 4/30/24, End Date , Taking? Yes, Authorizing Provider Provider, Outside    Medication loratadine (CLARITIN) 10 MG tablet, Sig Take 10 mg by mouth as needed., Start Date 4/5/24, End Date , Taking? Yes, Authorizing Provider Provider, Outside    Medication DULoxetine (CYMBALTA) 60 MG capsule, Sig Take 60 mg by mouth daily., Start Date 4/30/24, End Date , Taking? Yes, Authorizing Provider Provider, Outside    Medication carvedilol (COREG) 25 MG tablet, Sig Take 25 mg by mouth in the morning and 25 mg in the evening., Start Date 4/5/24, End Date , Taking? Yes, Authorizing Provider Provider, Outside    Medication atorvastatin (LIPITOR) 40 MG tablet, Sig Take 40 mg by mouth daily., Start Date 4/5/24, End Date , Taking? Yes, Authorizing Provider Provider, Outside    Medication amLODIPine (NORVASC) 5 MG tablet, Sig Take 5 mg by mouth daily., Start Date 4/5/24, End Date , Taking? Yes, Authorizing Provider Provider, Outside    Medication Multiple Vitamins-Minerals (PreserVision AREDS 2+Multi Vit) Cap, Sig Take 1 capsule by mouth in the morning and 1 capsule in the evening., Start Date 4/3/24, End Date , Taking? Yes, Authorizing Provider Provider, Outside    Medication cholecalciferol (VITAMIN D) 25 mcg(1,000 units) tablet, Sig Take 25 mcg by mouth daily., Start Date , End Date , Taking? Yes, Authorizing Provider Provider, Outside    Medication Carboxymethylcellulose Sodium (ARTIFICIAL TEARS OP), Sig Place 1 drop into both eyes as needed., Start Date , End Date , Taking? Yes, Authorizing Provider Provider, Outside    Medication gabapentin (NEURONTIN) 600 MG tablet, Sig Take 300 mg by mouth in the morning and 300 mg in the evening., Start Date 4/5/24, End Date , Taking? Yes, Authorizing Provider  Provider, Outside    Medication TIOTROPIUM BROMIDE-OLODATEROL IN, Sig Inhale 2 puffs into the lungs daily., Start Date , End Date , Taking? Yes, Authorizing Provider Provider, Outside    Medication levothyroxine 125 MCG tablet, Sig Take 125 mcg by mouth daily., Start Date , End Date , Taking? Yes, Authorizing Provider Provider, Outside    Medication metformin (GLUCOPHAGE) 1000 MG tablet, Sig Take 1,000 mg by mouth in the morning and 1,000 mg in the evening., Start Date , End Date , Taking? Yes, Authorizing Provider Provider, Outside    Medication albuterol 108 (90 Base) MCG/ACT inhaler, Sig Inhale 2 puffs into the lungs every 4 hours as needed for Shortness of Breath or Wheezing., Start Date , End Date , Taking? Yes, Authorizing Provider Provider, Outside    Medication nitroGLYCERIN (NITROSTAT) 0.4 MG sublingual tablet, Sig Place 0.4 mg under the tongue every 5 minutes as needed., Start Date 4/5/24, End Date , Taking? , Authorizing Provider Provider, Outside    Medication clopidogrel (PLAVIX) 75 MG tablet, Sig 75 mg daily., Start Date 4/1/24, End Date , Taking? , Authorizing Provider Provider, Outside    Medication aspirin (ECOTRIN) 81 MG EC tablet, Sig Take 81 mg by mouth daily., Start Date , End Date , Taking? , Authorizing Provider Provider, Outside              Physical Exam     Airway   Mallampati: II  TM Distance: >3 FB  Neck ROM: Full    Cardiovascular  Cardiovascular exam normal  Cardio Rhythm: Regular  Cardio Rate: Normal    Pulmonary Exam  Pulmonary exam normal  Breath sounds clear to auscultation:  Yes      Anesthesia Plan:    ASA Status: 3  Anesthesia Type: General    Induction: Intravenous  Checklist  Reviewed: NPO Status, Problem list, Medications, Allergies, Past Med History, Patient Summary, Beta Blocker Status, Lab Results and EKG  Consent/Risks Discussed Statement:  The proposed anesthetic plan, including its risks and benefits, have been discussed with the Patient and Other Family Member along  with the risks and benefits of alternatives. Questions were encouraged and answered and the patient and/or representative understands and agrees to proceed.        I discussed with the patient (and/or patient's legal representative) the risks and benefits of the proposed anesthesia plan, General, which may include services performed by other anesthesia providers.    Alternative anesthesia plans, if available, were reviewed with the patient (and/or patient's legal representative). Discussion has been held with the patient (and/or patient's legal representative) regarding risks of anesthesia, which include  emergent situations that may require change in anesthesia plan.    The patient (and/or patient's legal representative) has indicated understanding, his/her questions have been answered, and he/she wishes to proceed with the planned anesthetic.    Blood Products: Not Anticipated     Nursing

## 2024-08-30 NOTE — PROGRESS NOTE ADULT - SUBJECTIVE AND OBJECTIVE BOX
Interval Events: Ostomy oozy but self-resolved. Anxious. Extra 0.5 ativan and melatonin.     Subjective Events: Patient seen and examined at bedside. No acute distress. Endorses "feeling better" and breathing more comfortably this morning      Allergies    penicillin (Angioedema)    Intolerances        Vital Signs Last 24 Hrs  T(C): 36.8 (24 Jan 2024 22:12), Max: 37 (24 Jan 2024 09:00)  T(F): 98.3 (24 Jan 2024 22:12), Max: 98.6 (24 Jan 2024 09:00)  HR: 109 (25 Jan 2024 06:16) (82 - 112)  BP: 139/75 (25 Jan 2024 06:00) (103/58 - 186/81)  BP(mean): 102 (25 Jan 2024 06:00) (75 - 116)  RR: 24 (25 Jan 2024 06:16) (15 - 35)  SpO2: 97% (25 Jan 2024 06:16) (93% - 100%)    Parameters below as of 25 Jan 2024 06:16  Patient On (Oxygen Delivery Method): nasal cannula        01-24 @ 07:01 - 01-25 @ 07:00  --------------------------------------------------------  IN: 4569.8 mL / OUT: 4395 mL / NET: 174.8 mL      01-24 @ 07:01 - 01-25 @ 07:00  --------------------------------------------------------  IN: 4569.8 mL / OUT: 4395 mL / NET: 174.8 mL        Physical Exam:     Gen: No acute distress   Neuro: A&OX3 No deficits  CV:RRR Reg s1s2 noM  Pulm: Lung sounds clear bilaterally   Abd: Soft NT ND + BS  Ext: No C/C/E   Vasc: + DP b/l   Skin: no rashes noted  MSK: No joint swelling  Psych: No signs of anxiety or depression      LABS:      CBC Full  -  ( 24 Jan 2024 04:42 )  WBC Count : 8.64 K/uL  RBC Count : 2.38 M/uL  Hemoglobin : 7.5 g/dL  Hematocrit : 23.8 %  Platelet Count - Automated : 146 K/uL  Mean Cell Volume : 100.0 fl  Mean Cell Hemoglobin : 31.5 pg  Mean Cell Hemoglobin Concentration : 31.5 gm/dL  Auto Neutrophil # : x  Auto Lymphocyte # : x  Auto Monocyte # : x  Auto Eosinophil # : x  Auto Basophil # : x  Auto Neutrophil % : x  Auto Lymphocyte % : x  Auto Monocyte % : x  Auto Eosinophil % : x  Auto Basophil % : x    01-25    139  |  101  |  40<H>  ----------------------------<  123<H>  3.8   |  31  |  1.17    Ca    7.8<L>      25 Jan 2024 05:30  Phos  2.9     01-25  Mg     1.9     01-25    TPro  7.7  /  Alb  2.4<L>  /  TBili  4.6<H>  /  DBili  3.0<H>  /  AST  90<H>  /  ALT  57<H>  /  AlkPhos  102  01-25    PT/INR - ( 25 Jan 2024 05:30 )   PT: 14.7 sec;   INR: 1.30          PTT - ( 25 Jan 2024 05:30 )  PTT:36.6 sec      Urinalysis Basic - ( 25 Jan 2024 05:30 )    Color: x / Appearance: x / SG: x / pH: x  Gluc: 123 mg/dL / Ketone: x  / Bili: x / Urobili: x   Blood: x / Protein: x / Nitrite: x   Leuk Esterase: x / RBC: x / WBC x   Sq Epi: x / Non Sq Epi: x / Bacteria: x              RADIOLOGY & ADDITIONAL STUDIES (The following images were personally reviewed):          A/p: 78yMale Interval Events: Ostomy oozy but self-resolved. Anxious. Extra 0.5 ativan and melatonin.     Subjective Events: Patient seen and examined at bedside. No acute distress. Endorses "feeling better" and breathing more comfortably this morning      Allergies    penicillin (Angioedema)    Intolerances        Vital Signs Last 24 Hrs  T(C): 36.8 (24 Jan 2024 22:12), Max: 37 (24 Jan 2024 09:00)  T(F): 98.3 (24 Jan 2024 22:12), Max: 98.6 (24 Jan 2024 09:00)  HR: 109 (25 Jan 2024 06:16) (82 - 112)  BP: 139/75 (25 Jan 2024 06:00) (103/58 - 186/81)  BP(mean): 102 (25 Jan 2024 06:00) (75 - 116)  RR: 24 (25 Jan 2024 06:16) (15 - 35)  SpO2: 97% (25 Jan 2024 06:16) (93% - 100%)    Parameters below as of 25 Jan 2024 06:16  Patient On (Oxygen Delivery Method): nasal cannula        01-24 @ 07:01 - 01-25 @ 07:00  --------------------------------------------------------  IN: 4569.8 mL / OUT: 4395 mL / NET: 174.8 mL      01-24 @ 07:01 - 01-25 @ 07:00  --------------------------------------------------------  IN: 4569.8 mL / OUT: 4395 mL / NET: 174.8 mL        Physical Exam:     Gen: No acute distress   Neuro: A&OX3 No deficits  CV: Irregular rhythm, regular rate, bilateral trace lower extremity edema   Pulm: Lung sounds clear bilaterally   Abd: Soft NT ND + BS  Ext: Warm, well-perfused   Vasc: + 2 bilateral pedal/radial pulses   Skin: no rashes noted  MSK: No joint swelling  Psych: No signs of anxiety or depression      LABS:      CBC Full  -  ( 24 Jan 2024 04:42 )  WBC Count : 8.64 K/uL  RBC Count : 2.38 M/uL  Hemoglobin : 7.5 g/dL  Hematocrit : 23.8 %  Platelet Count - Automated : 146 K/uL  Mean Cell Volume : 100.0 fl  Mean Cell Hemoglobin : 31.5 pg  Mean Cell Hemoglobin Concentration : 31.5 gm/dL      01-25    139  |  101  |  40<H>  ----------------------------<  123<H>  3.8   |  31  |  1.17    Ca    7.8<L>      25 Jan 2024 05:30  Phos  2.9     01-25  Mg     1.9     01-25    TPro  7.7  /  Alb  2.4<L>  /  TBili  4.6<H>  /  DBili  3.0<H>  /  AST  90<H>  /  ALT  57<H>  /  AlkPhos  102  01-25    PT/INR - ( 25 Jan 2024 05:30 )   PT: 14.7 sec;   INR: 1.30          PTT - ( 25 Jan 2024 05:30 )  PTT:36.6 sec      Urinalysis Basic - ( 25 Jan 2024 05:30 )    Color: x / Appearance: x / SG: x / pH: x  Gluc: 123 mg/dL / Ketone: x  / Bili: x / Urobili: x   Blood: x / Protein: x / Nitrite: x   Leuk Esterase: x / RBC: x / WBC x   Sq Epi: x / Non Sq Epi: x / Bacteria: x               Interval Events: Ostomy oozy but self-resolved. Anxious. Extra 0.5 ativan and melatonin.     Subjective Events: Patient seen and examined at bedside. No acute distress. Endorses "feeling better" and breathing more comfortably this morning. ROS otherwise negative       Allergies    penicillin (Angioedema)    Intolerances        Vital Signs Last 24 Hrs  T(C): 36.8 (24 Jan 2024 22:12), Max: 37 (24 Jan 2024 09:00)  T(F): 98.3 (24 Jan 2024 22:12), Max: 98.6 (24 Jan 2024 09:00)  HR: 109 (25 Jan 2024 06:16) (82 - 112)  BP: 139/75 (25 Jan 2024 06:00) (103/58 - 186/81)  BP(mean): 102 (25 Jan 2024 06:00) (75 - 116)  RR: 24 (25 Jan 2024 06:16) (15 - 35)  SpO2: 97% (25 Jan 2024 06:16) (93% - 100%)    Parameters below as of 25 Jan 2024 06:16  Patient On (Oxygen Delivery Method): nasal cannula        01-24 @ 07:01 - 01-25 @ 07:00  --------------------------------------------------------  IN: 4569.8 mL / OUT: 4395 mL / NET: 174.8 mL      01-24 @ 07:01 - 01-25 @ 07:00  --------------------------------------------------------  IN: 4569.8 mL / OUT: 4395 mL / NET: 174.8 mL        Physical Exam:     Gen: No acute distress   Neuro: A&OX3 No deficits  CV: Irregular rhythm, regular rate, bilateral trace lower extremity edema   Pulm: Lung sounds clear bilaterally   Abd: Soft NT ND + BS, Eakins pouch intact draining, no bleeding present, ostomy site with brown drainage    Ext: Warm, well-perfused   Vasc: + 2 bilateral pedal/radial pulses   Skin: jaundice; no rashes noted  MSK: No joint swelling  Psych: No signs of anxiety or depression    POCUS: Small bilateral pleural effusions, A-line predominant in the APICES.       LABS:      CBC Full  -  ( 24 Jan 2024 04:42 )  WBC Count : 8.64 K/uL  RBC Count : 2.38 M/uL  Hemoglobin : 7.5 g/dL  Hematocrit : 23.8 %  Platelet Count - Automated : 146 K/uL  Mean Cell Volume : 100.0 fl  Mean Cell Hemoglobin : 31.5 pg  Mean Cell Hemoglobin Concentration : 31.5 gm/dL      01-25    139  |  101  |  40<H>  ----------------------------<  123<H>  3.8   |  31  |  1.17    Ca    7.8<L>      25 Jan 2024 05:30  Phos  2.9     01-25  Mg     1.9     01-25    TPro  7.7  /  Alb  2.4<L>  /  TBili  4.6<H>  /  DBili  3.0<H>  /  AST  90<H>  /  ALT  57<H>  /  AlkPhos  102  01-25    PT/INR - ( 25 Jan 2024 05:30 )   PT: 14.7 sec;   INR: 1.30          PTT - ( 25 Jan 2024 05:30 )  PTT:36.6 sec      Urinalysis Basic - ( 25 Jan 2024 05:30 )    Color: x / Appearance: x / SG: x / pH: x  Gluc: 123 mg/dL / Ketone: x  / Bili: x / Urobili: x   Blood: x / Protein: x / Nitrite: x   Leuk Esterase: x / RBC: x / WBC x   Sq Epi: x / Non Sq Epi: x / Bacteria: x               (2) well flexed

## 2024-09-20 NOTE — CONSULT NOTE ADULT - ATTENDING COMMENTS
78M with AFib/Flutter s/p DCCVs on amiodarone, Crohn's, ileocectomy, appendectomy, long admission (6/23) for SBO vs Crohns flare, treated with decompression, lap converted to open TRE, SBR x 3, left in discontinuity with abthera vac on (6/27), ileocolic resection, small bowel anastomosis, and abdominal wall closure on (6/28), c/b fluid collection s/p IR aspiration of perihepatic fluid on (7/3), c/b wound dehiscence s/p exlap, washout, ileocolic resection with end ileostomy, blow hole colostomy, red rubber from ileostomy to small bowel anastomosis; Vicryl bridging mesh on (7/5), s/p percutaneous cholecystectomy and multiple complications presents with anemia, bleeding from abdominal wound, and ELVI. Physical exam as above. CXR showed BL pleural effusions (present in the past).   1. Anemia  2. ELVI  3. Abdominal wound  4. Chron's  5. Pancreatic duct IPMN's?  - transfuse PRBC  - iv fluids  - may need wound granulation tissue fulguration for bleeding control  - cont TPN
done

## 2024-10-30 NOTE — PROGRESS NOTE ADULT - PROBLEM SELECTOR PLAN 9
Problem: Chronic Conditions and Co-morbidities  Goal: Patient's chronic conditions and co-morbidity symptoms are monitored and maintained or improved  Outcome: Progressing     Problem: Safety - Adult  Goal: Free from fall injury  10/29/2024 0646 by Eleanor Nguyen RN  Outcome: Progressing     Problem: Neurosensory - Adult  Goal: Achieves stable or improved neurological status  Outcome: Progressing  Achieves stable or improved neurological status: Assess for and report changes in neurological status  Note: Pt CART 10/10 throughout this shift.     
  Problem: Safety - Adult  Goal: Free from fall injury  10/29/2024 1055 by Casey Canas, RN  Note: Orthostatic vital signs obtained at start of shift - see flowsheet for details.  Pt does not meet criteria for orthostasis.  Pt is a Med fall risk. See Ayers Fall Score and ABCDS Injury Risk assessments.     - Screening for Orthostasis AND not a [place] Risk per AYERS/ABCDS: Pt bed is in low position, side rails up, call light and belongings are in reach.  Fall risk light is on outside pts room.  Pt encouraged to call for assistance as needed. Will continue with hourly rounds for PO intake, pain needs, toileting and repositioning as needed.      Problem: Skin/Tissue Integrity - Adult  Goal: Skin integrity remains intact  10/29/2024 1055 by Casey Canas, RN  Note: Pt up independently this shift and using hat in toilet.  Pt continent of stool and urine, turns self frequently while in bed.  ADLs and hygiene performed independently throughout shift. Instructed pt on importance of maintaining good hygiene and activity levels.  No signs or symptoms of new skin breakdown noted.      Problem: Hematologic - Adult  Goal: Maintains hematologic stability  Note: Patient's hemoglobin this AM:   Recent Labs     10/29/24  0340   HGB 10.9*     Patient's platelet count this AM:   Recent Labs     10/29/24  0340       Thrombocytopenia not present at this time.  Patient showing no signs or symptoms of active bleeding.  Transfusion not indicated at this time.  Patient verbalizes understanding of all instructions. Will continue to assess and implement POC. Call light within reach and hourly rounding in place.      
  Problem: Safety - Adult  Goal: Free from fall injury  Outcome: Progressing  Flowsheets (Taken 10/28/2024 1715)  Free From Fall Injury: Instruct family/caregiver on patient safety  Note: Orthostatic vital signs obtained at start of shift - see flowsheet for details.  Pt does not meet criteria for orthostasis.  Pt is a Low fall risk. See Ayers Fall Score and ABCDS Injury Risk assessments.     - Screening for Orthostasis AND not a [place] Risk per AYERS/ABCDS: Pt bed is in low position, side rails up, call light and belongings are in reach.  Fall risk light is on outside pts room.  Pt encouraged to call for assistance as needed. Will continue with hourly rounds for PO intake, pain needs, toileting and repositioning as needed.      Problem: Neurosensory - Adult  Goal: Achieves stable or improved neurological status  Outcome: Progressing  Flowsheets (Taken 10/28/2024 1716)  Achieves stable or improved neurological status: Assess for and report changes in neurological status  Note: Pt CART completed during shift and 10/10.     Problem: Skin/Tissue Integrity - Adult  Goal: Skin integrity remains intact  Outcome: Progressing  Flowsheets (Taken 10/28/2024 1716)  Skin Integrity Remains Intact: Monitor for areas of redness and/or skin breakdown  Note: No new incidence of skin breakdown during shift. 4eyes completed.      Problem: Musculoskeletal - Adult  Goal: Return mobility to safest level of function  Outcome: Progressing  Flowsheets (Taken 10/28/2024 1716)  Return Mobility to Safest Level of Function: Assess patient stability and activity tolerance for standing, transferring and ambulating with or without assistive devices  Note: Pt independent and UAL Ortho Negative.     Problem: Metabolic/Fluid and Electrolytes - Adult  Goal: Electrolytes maintained within normal limits  Outcome: Progressing  Flowsheets (Taken 10/28/2024 1716)  Electrolytes maintained within normal limits:   Monitor labs and assess patient for signs 
  Problem: Safety - Adult  Goal: Free from fall injury  Outcome: Progressing  Free From Fall Injury: Instruct family/caregiver on patient safety  Note: Orthostatic vital signs obtained at start of shift - see flowsheet for details.  Pt meets criteria for orthostasis.  Pt is a Low fall risk. See Ayers Fall Score and ABCDS Injury Risk assessments.   - Screening for Orthostasis AND not a [place] Risk per AYERS/ABCDS: Pt bed is in low position, side rails up, call light and belongings are in reach.  Fall risk light is on outside pts room.  Pt encouraged to call for assistance as needed. Will continue with hourly rounds for PO intake, pain needs, toileting and repositioning as needed.       Problem: Neurosensory - Adult  Goal: Achieves stable or improved neurological status  Outcome: Progressing  Achieves stable or improved neurological status: Assess for and report changes in neurological status  Note: Pt CAR-T assessments remained 10/10, no signs of neuro toxicity during this shift      Problem: Hematologic - Adult  Goal: Maintains hematologic stability  Outcome: Progressing  Maintains hematologic stability: Assess for signs and symptoms of bleeding or hemorrhage  Note: Patient's hemoglobin this AM:   Recent Labs     10/30/24  0335   HGB 11.2*     Patient's platelet count this AM:   Recent Labs     10/30/24  0335   *    Thrombocytopenia Precautions in place.  Patient showing no signs or symptoms of active bleeding.  Transfusion not indicated at this time.  Patient verbalizes understanding of all instructions. Will continue to assess and implement POC. Call light within reach and hourly rounding in place.       
Home med: venlafaxine 150 mg 2 tabs QD.  - C/w home medication.    #Insomnia   - C/w home lorazepam 0.5mg HS PRN
Synthroid 50 mcg qd  - C/w home medication.
Home med: venlafaxine 150 mg 2 tabs QD.  - C/w home medication.    #Insomnia   - C/w home lorazepam 0.5mg HS PRN

## 2024-10-31 NOTE — PROGRESS NOTE ADULT - PROVIDER SPECIALTY LIST ADULT
Internal Medicine Rifampin Counseling: I discussed with the patient the risks of rifampin including but not limited to liver damage, kidney damage, red-orange body fluids, nausea/vomiting and severe allergy. Topical Metronidazole Counseling: Metronidazole is a topical antibiotic medication. You may experience burning, stinging, redness, or allergic reactions.  Please call our office if you develop any problems from using this medication. Carac Counseling:  I discussed with the patient the risks of Carac including but not limited to erythema, scaling, itching, weeping, crusting, and pain. Litfulo Counseling: I discussed with the patient the risks of Litfulo therapy including but not limited to upper respiratory tract infections, shingles, cold sores, and nausea. Live vaccines should be avoided.  This medication has been linked to serious infections; higher rate of mortality; malignancy and lymphoproliferative disorders; major adverse cardiovascular events; thrombosis; gastrointestinal perforations; neutropenia; lymphopenia; anemia; liver enzyme elevations; and lipid elevations. Cimetidine Pregnancy And Lactation Text: This medication is Pregnancy Category B and is considered safe during pregnancy. It is also excreted in breast milk and breast feeding isn't recommended. Nsaids Pregnancy And Lactation Text: These medications are considered safe up to 30 weeks gestation. It is excreted in breast milk. High Dose Vitamin A Counseling: Side effects reviewed, pt to contact office should one occur. Colchicine Counseling:  Patient counseled regarding adverse effects including but not limited to stomach upset (nausea, vomiting, stomach pain, or diarrhea).  Patient instructed to limit alcohol consumption while taking this medication.  Colchicine may reduce blood counts especially with prolonged use.  The patient understands that monitoring of kidney function and blood counts may be required, especially at baseline. The patient verbalized understanding of the proper use and possible adverse effects of colchicine.  All of the patient's questions and concerns were addressed. Protopic Pregnancy And Lactation Text: This medication is Pregnancy Category C. It is unknown if this medication is excreted in breast milk when applied topically. Hydroxychloroquine Pregnancy And Lactation Text: This medication has been shown to cause fetal harm but it isn't assigned a Pregnancy Risk Category. There are small amounts excreted in breast milk. Simponi Counseling:  I discussed with the patient the risks of golimumab including but not limited to myelosuppression, immunosuppression, autoimmune hepatitis, demyelinating diseases, lymphoma, and serious infections.  The patient understands that monitoring is required including a PPD at baseline and must alert us or the primary physician if symptoms of infection or other concerning signs are noted. Opioid Pregnancy And Lactation Text: These medications can lead to premature delivery and should be avoided during pregnancy. These medications are also present in breast milk in small amounts. Griseofulvin Counseling:  I discussed with the patient the risks of griseofulvin including but not limited to photosensitivity, cytopenia, liver damage, nausea/vomiting and severe allergy.  The patient understands that this medication is best absorbed when taken with a fatty meal (e.g., ice cream or french fries). Winlevi Pregnancy And Lactation Text: This medication is considered safe during pregnancy and breastfeeding. Erythromycin Counseling:  I discussed with the patient the risks of erythromycin including but not limited to GI upset, allergic reaction, drug rash, diarrhea, increase in liver enzymes, and yeast infections. Ivermectin Pregnancy And Lactation Text: This medication is Pregnancy Category C and it isn't known if it is safe during pregnancy. It is also excreted in breast milk. Erivedge Pregnancy And Lactation Text: This medication is Pregnancy Category X and is absolutely contraindicated during pregnancy. It is unknown if it is excreted in breast milk. Taltz Counseling: I discussed with the patient the risks of ixekizumab including but not limited to immunosuppression, serious infections, worsening of inflammatory bowel disease and drug reactions.  The patient understands that monitoring is required including a PPD at baseline and must alert us or the primary physician if symptoms of infection or other concerning signs are noted. Elidel Counseling: Patient may experience a mild burning sensation during topical application. Elidel is not approved in children less than 2 years of age. There have been case reports of hematologic and skin malignancies in patients using topical calcineurin inhibitors although causality is questionable. Cosentyx Pregnancy And Lactation Text: This medication is Pregnancy Category B and is considered safe during pregnancy. It is unknown if this medication is excreted in breast milk. Xelkarishmaz Pregnancy And Lactation Text: This medication is Pregnancy Category D and is not considered safe during pregnancy.  The risk during breast feeding is also uncertain. Topical Retinoid counseling:  Patient advised to apply a pea-sized amount only at bedtime and wait 30 minutes after washing their face before applying.  If too drying, patient may add a non-comedogenic moisturizer. The patient verbalized understanding of the proper use and possible adverse effects of retinoids.  All of the patient's questions and concerns were addressed. Spironolactone Pregnancy And Lactation Text: This medication can cause feminization of the male fetus and should be avoided during pregnancy. The active metabolite is also found in breast milk. Bactrim Counseling:  I discussed with the patient the risks of sulfa antibiotics including but not limited to GI upset, allergic reaction, drug rash, diarrhea, dizziness, photosensitivity, and yeast infections.  Rarely, more serious reactions can occur including but not limited to aplastic anemia, agranulocytosis, methemoglobinemia, blood dyscrasias, liver or kidney failure, lung infiltrates or desquamative/blistering drug rashes. Propranolol Counseling:  I discussed with the patient the risks of propranolol including but not limited to low heart rate, low blood pressure, low blood sugar, restlessness and increased cold sensitivity. They should call the office if they experience any of these side effects. Valtrex Pregnancy And Lactation Text: this medication is Pregnancy Category B and is considered safe during pregnancy. This medication is not directly found in breast milk but it's metabolite acyclovir is present. Include Pregnancy/Lactation Warning?: No High Dose Vitamin A Pregnancy And Lactation Text: High dose vitamin A therapy is contraindicated during pregnancy and breast feeding. Cyclosporine Pregnancy And Lactation Text: This medication is Pregnancy Category C and it isn't know if it is safe during pregnancy. This medication is excreted in breast milk. Minoxidil Pregnancy And Lactation Text: This medication has not been assigned a Pregnancy Risk Category but animal studies failed to show danger with the topical medication. It is unknown if the medication is excreted in breast milk. Erythromycin Pregnancy And Lactation Text: This medication is Pregnancy Category B and is considered safe during pregnancy. It is also excreted in breast milk. Qbrexza Counseling:  I discussed with the patient the risks of Qbrexza including but not limited to headache, mydriasis, blurred vision, dry eyes, nasal dryness, dry mouth, dry throat, dry skin, urinary hesitation, and constipation.  Local skin reactions including erythema, burning, stinging, and itching can also occur. Olanzapine Counseling- I discussed with the patient the common side effects of olanzapine including but are not limited to: lack of energy, dry mouth, increased appetite, sleepiness, tremor, constipation, dizziness, changes in behavior, or restlessness.  Explained that teenagers are more likely to experience headaches, abdominal pain, pain in the arms or legs, tiredness, and sleepiness.  Serious side effects include but are not limited: increased risk of death in elderly patients who are confused, have memory loss, or dementia-related psychosis; hyperglycemia; increased cholesterol and triglycerides; and weight gain. Colchicine Pregnancy And Lactation Text: This medication is Pregnancy Category C and isn't considered safe during pregnancy. It is excreted in breast milk. Carac Pregnancy And Lactation Text: This medication is Pregnancy Category X and contraindicated in pregnancy and in women who may become pregnant. It is unknown if this medication is excreted in breast milk. Litfulo Pregnancy And Lactation Text: Based on animal studies, Lifulo may cause embryo-fetal harm when administered to pregnant women.  The medication should not be used in pregnancy.  Breastfeeding is not recommended during treatment. Doxepin Counseling:  Patient advised that the medication is sedating and not to drive a car after taking this medication. Patient informed of potential adverse effects including but not limited to dry mouth, urinary retention, and blurry vision.  The patient verbalized understanding of the proper use and possible adverse effects of doxepin.  All of the patient's questions and concerns were addressed. Rifampin Pregnancy And Lactation Text: This medication is Pregnancy Category C and it isn't know if it is safe during pregnancy. It is also excreted in breast milk and should not be used if you are breast feeding. Topical Metronidazole Pregnancy And Lactation Text: This medication is Pregnancy Category B and considered safe during pregnancy.  It is also considered safe to use while breastfeeding. Griseofulvin Pregnancy And Lactation Text: This medication is Pregnancy Category X and is known to cause serious birth defects. It is unknown if this medication is excreted in breast milk but breast feeding should be avoided. Bactrim Pregnancy And Lactation Text: This medication is Pregnancy Category D and is known to cause fetal risk.  It is also excreted in breast milk. Propranolol Pregnancy And Lactation Text: This medication is Pregnancy Category C and it isn't known if it is safe during pregnancy. It is excreted in breast milk. Libtayo Counseling- I discussed with the patient the risks of Libtayo including but not limited to nausea, vomiting, diarrhea, and bone or muscle pain.  The patient verbalized understanding of the proper use and possible adverse effects of Libtayo.  All of the patient's questions and concerns were addressed. Topical Retinoid Pregnancy And Lactation Text: This medication is Pregnancy Category C. It is unknown if this medication is excreted in breast milk. Dupixent Counseling: I discussed with the patient the risks of dupilumab including but not limited to eye inflammation and irritation, cold sores, injection site reactions, allergic reactions and increased risk of parasitic infection. The patient understands that monitoring is required and they must alert us or the primary physician if symptoms of infection or other concerning signs are noted. Taltz Pregnancy And Lactation Text: The risk during pregnancy and breastfeeding is uncertain with this medication. VTAMA Counseling: I discussed with the patient that VTAMA is not for use in the eyes, mouth or mouth. They should call the office if they develop any signs of allergic reactions to VTAMA. The patient verbalized understanding of the proper use and possible adverse effects of VTAMA.  All of the patient's questions and concerns were addressed. Low Dose Naltrexone Counseling- I discussed with the patient the potential risks and side effects of low dose naltrexone including but not limited to: more vivid dreams, headaches, nausea, vomiting, abdominal pain, fatigue, dizziness, and anxiety. Olanzapine Pregnancy And Lactation Text: This medication is pregnancy category C.   There are no adequate and well controlled trials with olanzapine in pregnant females.  Olanzapine should be used during pregnancy only if the potential benefit justifies the potential risk to the fetus.   In a study in lactating healthy women, olanzapine was excreted in breast milk.  It is recommended that women taking olanzapine should not breast feed. Adbry Counseling: I discussed with the patient the risks of tralokinumab including but not limited to eye infection and irritation, cold sores, injection site reactions, worsening of asthma, allergic reactions and increased risk of parasitic infection.  Live vaccines should be avoided while taking tralokinumab. The patient understands that monitoring is required and they must alert us or the primary physician if symptoms of infection or other concerning signs are noted. Ilumya Counseling: I discussed with the patient the risks of tildrakizumab including but not limited to immunosuppression, malignancy, posterior leukoencephalopathy syndrome, and serious infections.  The patient understands that monitoring is required including a PPD at baseline and must alert us or the primary physician if symptoms of infection or other concerning signs are noted. Calcipotriene Counseling:  I discussed with the patient the risks of calcipotriene including but not limited to erythema, scaling, itching, and irritation. Dapsone Counseling: I discussed with the patient the risks of dapsone including but not limited to hemolytic anemia, agranulocytosis, rashes, methemoglobinemia, kidney failure, peripheral neuropathy, headaches, GI upset, and liver toxicity.  Patients who start dapsone require monitoring including baseline LFTs and weekly CBCs for the first month, then every month thereafter.  The patient verbalized understanding of the proper use and possible adverse effects of dapsone.  All of the patient's questions and concerns were addressed. Olumiant Counseling: I discussed with the patient the risks of Olumiant therapy including but not limited to upper respiratory tract infections, shingles, cold sores, and nausea. Live vaccines should be avoided.  This medication has been linked to serious infections; higher rate of mortality; malignancy and lymphoproliferative disorders; major adverse cardiovascular events; thrombosis; gastrointestinal perforations; neutropenia; lymphopenia; anemia; liver enzyme elevations; and lipid elevations. Methotrexate Counseling:  Patient counseled regarding adverse effects of methotrexate including but not limited to nausea, vomiting, abnormalities in liver function tests. Patients may develop mouth sores, rash, diarrhea, and abnormalities in blood counts. The patient understands that monitoring is required including LFT's and blood counts.  There is a rare possibility of scarring of the liver and lung problems that can occur when taking methotrexate. Persistent nausea, loss of appetite, pale stools, dark urine, cough, and shortness of breath should be reported immediately. Patient advised to discontinue methotrexate treatment at least three months before attempting to become pregnant.  I discussed the need for folate supplements while taking methotrexate.  These supplements can decrease side effects during methotrexate treatment. The patient verbalized understanding of the proper use and possible adverse effects of methotrexate.  All of the patient's questions and concerns were addressed. Mirvaso Counseling: Mirvaso is a topical medication which can decrease superficial blood flow where applied. Side effects are uncommon and include stinging, redness and allergic reactions. Acitretin Counseling:  I discussed with the patient the risks of acitretin including but not limited to hair loss, dry lips/skin/eyes, liver damage, hyperlipidemia, depression/suicidal ideation, photosensitivity.  Serious rare side effects can include but are not limited to pancreatitis, pseudotumor cerebri, bony changes, clot formation/stroke/heart attack.  Patient understands that alcohol is contraindicated since it can result in liver toxicity and significantly prolong the elimination of the drug by many years. Azathioprine Counseling:  I discussed with the patient the risks of azathioprine including but not limited to myelosuppression, immunosuppression, hepatotoxicity, lymphoma, and infections.  The patient understands that monitoring is required including baseline LFTs, Creatinine, possible TPMP genotyping and weekly CBCs for the first month and then every 2 weeks thereafter.  The patient verbalized understanding of the proper use and possible adverse effects of azathioprine.  All of the patient's questions and concerns were addressed. Sarecycline Counseling: Patient advised regarding possible photosensitivity and discoloration of the teeth, skin, lips, tongue and gums.  Patient instructed to avoid sunlight, if possible.  When exposed to sunlight, patients should wear protective clothing, sunglasses, and sunscreen.  The patient was instructed to call the office immediately if the following severe adverse effects occur:  hearing changes, easy bruising/bleeding, severe headache, or vision changes.  The patient verbalized understanding of the proper use and possible adverse effects of sarecycline.  All of the patient's questions and concerns were addressed. Topical Steroids Counseling: I discussed with the patient that prolonged use of topical steroids can result in the increased appearance of superficial blood vessels (telangiectasias), lightening (hypopigmentation) and thinning of the skin (atrophy).  Patient understands to avoid using high potency steroids in skin folds, the groin or the face.  The patient verbalized understanding of the proper use and possible adverse effects of topical steroids.  All of the patient's questions and concerns were addressed. Doxepin Pregnancy And Lactation Text: This medication is Pregnancy Category C and it isn't known if it is safe during pregnancy. It is also excreted in breast milk and breast feeding isn't recommended. Cephalexin Counseling: I counseled the patient regarding use of cephalexin as an antibiotic for prophylactic and/or therapeutic purposes. Cephalexin (commonly prescribed under brand name Keflex) is a cephalosporin antibiotic which is active against numerous classes of bacteria, including most skin bacteria. Side effects may include nausea, diarrhea, gastrointestinal upset, rash, hives, yeast infections, and in rare cases, hepatitis, kidney disease, seizures, fever, confusion, neurologic symptoms, and others. Patients with severe allergies to penicillin medications are cautioned that there is about a 10% incidence of cross-reactivity with cephalosporins. When possible, patients with penicillin allergies should use alternatives to cephalosporins for antibiotic therapy. Tazorac Counseling:  Patient advised that medication is irritating and drying.  Patient may need to apply sparingly and wash off after an hour before eventually leaving it on overnight.  The patient verbalized understanding of the proper use and possible adverse effects of tazorac.  All of the patient's questions and concerns were addressed. SSKI Counseling:  I discussed with the patient the risks of SSKI including but not limited to thyroid abnormalities, metallic taste, GI upset, fever, headache, acne, arthralgias, paraesthesias, lymphadenopathy, easy bleeding, arrhythmias, and allergic reaction. Qbrexza Pregnancy And Lactation Text: There is no available data on Qbrexza use in pregnant women.  There is no available data on Qbrexza use in lactation. Vtama Pregnancy And Lactation Text: It is unknown if this medication can cause problems during pregnancy and breastfeeding. Metronidazole Counseling:  I discussed with the patient the risks of metronidazole including but not limited to seizures, nausea/vomiting, a metallic taste in the mouth, nausea/vomiting and severe allergy. Itraconazole Counseling:  I discussed with the patient the risks of itraconazole including but not limited to liver damage, nausea/vomiting, neuropathy, and severe allergy.  The patient understands that this medication is best absorbed when taken with acidic beverages such as non-diet cola or ginger ale.  The patient understands that monitoring is required including baseline LFTs and repeat LFTs at intervals.  The patient understands that they are to contact us or the primary physician if concerning signs are noted. Libtayo Pregnancy And Lactation Text: This medication is contraindicated in pregnancy and when breast feeding. Finasteride Male Counseling: Finasteride Counseling:  I discussed with the patient the risks of use of finasteride including but not limited to decreased libido, decreased ejaculate volume, gynecomastia, and depression. Women should not handle medication.  All of the patient's questions and concerns were addressed. Methotrexate Pregnancy And Lactation Text: This medication is Pregnancy Category X and is known to cause fetal harm. This medication is excreted in breast milk. Mirvaso Pregnancy And Lactation Text: This medication has not been assigned a Pregnancy Risk Category. It is unknown if the medication is excreted in breast milk. Dupixent Pregnancy And Lactation Text: This medication likely crosses the placenta but the risk for the fetus is uncertain. This medication is excreted in breast milk. Tremfya Counseling: I discussed with the patient the risks of guselkumab including but not limited to immunosuppression, serious infections, and drug reactions.  The patient understands that monitoring is required including a PPD at baseline and must alert us or the primary physician if symptoms of infection or other concerning signs are noted. Ketoconazole Counseling:   Patient counseled regarding improving absorption with orange juice.  Adverse effects include but are not limited to breast enlargement, headache, diarrhea, nausea, upset stomach, liver function test abnormalities, taste disturbance, and stomach pain.  There is a rare possibility of liver failure that can occur when taking ketoconazole. The patient understands that monitoring of LFTs may be required, especially at baseline. The patient verbalized understanding of the proper use and possible adverse effects of ketoconazole.  All of the patient's questions and concerns were addressed. Skyrizi Counseling: I discussed with the patient the risks of risankizumab-rzaa including but not limited to immunosuppression, and serious infections.  The patient understands that monitoring is required including a PPD at baseline and must alert us or the primary physician if symptoms of infection or other concerning signs are noted. Eucrisa Counseling: Patient may experience a mild burning sensation during topical application. Eucrisa is not approved in children less than 3 months of age. Aklief counseling:  Patient advised to apply a pea-sized amount only at bedtime and wait 30 minutes after washing their face before applying.  If too drying, patient may add a non-comedogenic moisturizer.  The most commonly reported side effects including irritation, redness, scaling, dryness, stinging, burning, itching, and increased risk of sunburn.  The patient verbalized understanding of the proper use and possible adverse effects of retinoids.  All of the patient's questions and concerns were addressed. Topical Steroids Applications Pregnancy And Lactation Text: Most topical steroids are considered safe to use during pregnancy and lactation.  Any topical steroid applied to the breast or nipple should be washed off before breastfeeding. Adbry Pregnancy And Lactation Text: It is unknown if this medication will adversely affect pregnancy or breast feeding. Olumiant Pregnancy And Lactation Text: Based on animal studies, Olumiant may cause embryo-fetal harm when administered to pregnant women.  The medication should not be used in pregnancy.  Breastfeeding is not recommended during treatment. Calcipotriene Pregnancy And Lactation Text: The use of this medication during pregnancy or lactation is not recommended as there is insufficient data. Oral Minoxidil Counseling- I discussed with the patient the risks of oral minoxidil including but not limited to shortness of breath, swelling of the feet or ankles, dizziness, lightheadedness, unwanted hair growth and allergic reaction.  The patient verbalized understanding of the proper use and possible adverse effects of oral minoxidil.  All of the patient's questions and concerns were addressed. Dapsone Pregnancy And Lactation Text: This medication is Pregnancy Category C and is not considered safe during pregnancy or breast feeding. Hydroxyzine Counseling: Patient advised that the medication is sedating and not to drive a car after taking this medication.  Patient informed of potential adverse effects including but not limited to dry mouth, urinary retention, and blurry vision.  The patient verbalized understanding of the proper use and possible adverse effects of hydroxyzine.  All of the patient's questions and concerns were addressed. Acitretin Pregnancy And Lactation Text: This medication is Pregnancy Category X and should not be given to women who are pregnant or may become pregnant in the future. This medication is excreted in breast milk. Azathioprine Pregnancy And Lactation Text: This medication is Pregnancy Category D and isn't considered safe during pregnancy. It is unknown if this medication is excreted in breast milk. Sarecycline Pregnancy And Lactation Text: This medication is Pregnancy Category D and not consider safe during pregnancy. It is also excreted in breast milk. Rhofade Counseling: Rhofade is a topical medication which can decrease superficial blood flow where applied. Side effects are uncommon and include stinging, redness and allergic reactions. Cephalexin Pregnancy And Lactation Text: This medication is Pregnancy Category B and considered safe during pregnancy.  It is also excreted in breast milk but can be used safely for shorter doses. Sski Pregnancy And Lactation Text: This medication is Pregnancy Category D and isn't considered safe during pregnancy. It is excreted in breast milk. Odomzo Counseling- I discussed with the patient the risks of Odomzo including but not limited to nausea, vomiting, diarrhea, constipation, weight loss, changes in the sense of taste, decreased appetite, muscle spasms, and hair loss.  The patient verbalized understanding of the proper use and possible adverse effects of Odomzo.  All of the patient's questions and concerns were addressed. Zoryve Counseling:  I discussed with the patient that Zoryve is not for use in the eyes, mouth or vagina. The most commonly reported side effects include diarrhea, headache, insomnia, application site pain, upper respiratory tract infections, and urinary tract infections.  All of the patient's questions and concerns were addressed. Metronidazole Pregnancy And Lactation Text: This medication is Pregnancy Category B and considered safe during pregnancy.  It is also excreted in breast milk. Opzelura Counseling:  I discussed with the patient the risks of Opzelura including but not limited to nasopharngitis, bronchitis, ear infection, eosinophila, hives, diarrhea, folliculitis, tonsillitis, and rhinorrhea.  Taken orally, this medication has been linked to serious infections; higher rate of mortality; malignancy and lymphoproliferative disorders; major adverse cardiovascular events; thrombosis; thrombocytopenia, anemia, and neutropenia; and lipid elevations. Itraconazole Pregnancy And Lactation Text: This medication is Pregnancy Category C and it isn't know if it is safe during pregnancy. It is also excreted in breast milk. Enbrel Counseling:  I discussed with the patient the risks of etanercept including but not limited to myelosuppression, immunosuppression, autoimmune hepatitis, demyelinating diseases, lymphoma, and infections.  The patient understands that monitoring is required including a PPD at baseline and must alert us or the primary physician if symptoms of infection or other concerning signs are noted. Ketoconazole Pregnancy And Lactation Text: This medication is Pregnancy Category C and it isn't know if it is safe during pregnancy. It is also excreted in breast milk and breast feeding isn't recommended. Aklief Pregnancy And Lactation Text: It is unknown if this medication is safe to use during pregnancy.  It is unknown if this medication is excreted in breast milk.  Breastfeeding women should use the topical cream on the smallest area of the skin for the shortest time needed while breastfeeding.  Do not apply to nipple and areola. Tazorac Pregnancy And Lactation Text: This medication is not safe during pregnancy. It is unknown if this medication is excreted in breast milk. Cellcept Counseling:  I discussed with the patient the risks of mycophenolate mofetil including but not limited to infection/immunosuppression, GI upset, hypokalemia, hypercholesterolemia, bone marrow suppression, lymphoproliferative disorders, malignancy, GI ulceration/bleed/perforation, colitis, interstitial lung disease, kidney failure, progressive multifocal leukoencephalopathy, and birth defects.  The patient understands that monitoring is required including a baseline creatinine and regular CBC testing. In addition, patient must alert us immediately if symptoms of infection or other concerning signs are noted. Cantharidin Counseling:  I discussed with the patient the risks of Cantharidin including but not limited to pain, redness, burning, itching, and blistering. Rinvoq Counseling: I discussed with the patient the risks of Rinvoq therapy including but not limited to upper respiratory tract infections, shingles, cold sores, bronchitis, nausea, cough, fever, acne, and headache. Live vaccines should be avoided.  This medication has been linked to serious infections; higher rate of mortality; malignancy and lymphoproliferative disorders; major adverse cardiovascular events; thrombosis; thrombocytopenia, anemia, and neutropenia; lipid elevations; liver enzyme elevations; and gastrointestinal perforations. Hydroxyzine Pregnancy And Lactation Text: This medication is not safe during pregnancy and should not be taken. It is also excreted in breast milk and breast feeding isn't recommended. Tetracycline Counseling: Patient counseled regarding possible photosensitivity and increased risk for sunburn.  Patient instructed to avoid sunlight, if possible.  When exposed to sunlight, patients should wear protective clothing, sunglasses, and sunscreen.  The patient was instructed to call the office immediately if the following severe adverse effects occur:  hearing changes, easy bruising/bleeding, severe headache, or vision changes.  The patient verbalized understanding of the proper use and possible adverse effects of tetracycline.  All of the patient's questions and concerns were addressed. Patient understands to avoid pregnancy while on therapy due to potential birth defects. Bimzelx Counseling:  I discussed with the patient the risks of Bimzelx including but not limited to depression, immunosuppression, allergic reactions and infections.  The patient understands that monitoring is required including a PPD at baseline and must alert us or the primary physician if symptoms of infection or other concerning signs are noted. Topical Sulfur Applications Counseling: Topical Sulfur Counseling: Patient counseled that this medication may cause skin irritation or allergic reactions.  In the event of skin irritation, the patient was advised to reduce the amount of the drug applied or use it less frequently.   The patient verbalized understanding of the proper use and possible adverse effects of topical sulfur application.  All of the patient's questions and concerns were addressed. Gabapentin Counseling: I discussed with the patient the risks of gabapentin including but not limited to dizziness, somnolence, fatigue and ataxia. Finasteride Female Counseling: Finasteride Counseling:  I discussed with the patient the risks of use of finasteride including but not limited to decreased libido and sexual dysfunction. Explained the teratogenic nature of the medication and stressed the importance of not getting pregnant during treatment. All of the patient's questions and concerns were addressed. Prednisone Counseling:  I discussed with the patient the risks of prolonged use of prednisone including but not limited to weight gain, insomnia, osteoporosis, mood changes, diabetes, susceptibility to infection, glaucoma and high blood pressure.  In cases where prednisone use is prolonged, patients should be monitored with blood pressure checks, serum glucose levels and an eye exam.  Additionally, the patient may need to be placed on GI prophylaxis, PCP prophylaxis, and calcium and vitamin D supplementation and/or a bisphosphonate.  The patient verbalized understanding of the proper use and the possible adverse effects of prednisone.  All of the patient's questions and concerns were addressed. Oral Minoxidil Pregnancy And Lactation Text: This medication should only be used when clearly needed if you are pregnant, attempting to become pregnant or breast feeding. Thalidomide Counseling: I discussed with the patient the risks of thalidomide including but not limited to birth defects, anxiety, weakness, chest pain, dizziness, cough and severe allergy. Bexarotene Counseling:  I discussed with the patient the risks of bexarotene including but not limited to hair loss, dry lips/skin/eyes, liver abnormalities, hyperlipidemia, pancreatitis, depression/suicidal ideation, photosensitivity, drug rash/allergic reactions, hypothyroidism, anemia, leukopenia, infection, cataracts, and teratogenicity.  Patient understands that they will need regular blood tests to check lipid profile, liver function tests, white blood cell count, thyroid function tests and pregnancy test if applicable. Rituxan Counseling:  I discussed with the patient the risks of Rituxan infusions. Side effects can include infusion reactions, severe drug rashes including mucocutaneous reactions, reactivation of latent hepatitis and other infections and rarely progressive multifocal leukoencephalopathy.  All of the patient's questions and concerns were addressed. Hydroquinone Counseling:  Patient advised that medication may result in skin irritation, lightening (hypopigmentation), dryness, and burning.  In the event of skin irritation, the patient was advised to reduce the amount of the drug applied or use it less frequently.  Rarely, spots that are treated with hydroquinone can become darker (pseudoochronosis).  Should this occur, patient instructed to stop medication and call the office. The patient verbalized understanding of the proper use and possible adverse effects of hydroquinone.  All of the patient's questions and concerns were addressed. Cantharidin Pregnancy And Lactation Text: This medication has not been proven safe during pregnancy. It is unknown if this medication is excreted in breast milk. Low Dose Naltrexone Pregnancy And Lactation Text: Naltrexone is pregnancy category C.  There have been no adequate and well-controlled studies in pregnant women.  It should be used in pregnancy only if the potential benefit justifies the potential risk to the fetus.   Limited data indicates that naltrexone is minimally excreted into breastmilk. Azelaic Acid Counseling: Patient counseled that medicine may cause skin irritation and to avoid applying near the eyes.  In the event of skin irritation, the patient was advised to reduce the amount of the drug applied or use it less frequently.   The patient verbalized understanding of the proper use and possible adverse effects of azelaic acid.  All of the patient's questions and concerns were addressed. Topical Clindamycin Counseling: Patient counseled that this medication may cause skin irritation or allergic reactions.  In the event of skin irritation, the patient was advised to reduce the amount of the drug applied or use it less frequently.   The patient verbalized understanding of the proper use and possible adverse effects of clindamycin.  All of the patient's questions and concerns were addressed. Infliximab Counseling:  I discussed with the patient the risks of infliximab including but not limited to myelosuppression, immunosuppression, autoimmune hepatitis, demyelinating diseases, lymphoma, and serious infections.  The patient understands that monitoring is required including a PPD at baseline and must alert us or the primary physician if symptoms of infection or other concerning signs are noted. Minocycline Counseling: Patient advised regarding possible photosensitivity and discoloration of the teeth, skin, lips, tongue and gums.  Patient instructed to avoid sunlight, if possible.  When exposed to sunlight, patients should wear protective clothing, sunglasses, and sunscreen.  The patient was instructed to call the office immediately if the following severe adverse effects occur:  hearing changes, easy bruising/bleeding, severe headache, or vision changes.  The patient verbalized understanding of the proper use and possible adverse effects of minocycline.  All of the patient's questions and concerns were addressed. Arava Counseling:  Patient counseled regarding adverse effects of Arava including but not limited to nausea, vomiting, abnormalities in liver function tests. Patients may develop mouth sores, rash, diarrhea, and abnormalities in blood counts. The patient understands that monitoring is required including LFTs and blood counts.  There is a rare possibility of scarring of the liver and lung problems that can occur when taking methotrexate. Persistent nausea, loss of appetite, pale stools, dark urine, cough, and shortness of breath should be reported immediately. Patient advised to discontinue Arava treatment and consult with a physician prior to attempting conception. The patient will have to undergo a treatment to eliminate Arava from the body prior to conception. Opzelura Pregnancy And Lactation Text: There is insufficient data to evaluate drug-associated risk for major birth defects, miscarriage, or other adverse maternal or fetal outcomes.  There is a pregnancy registry that monitors pregnancy outcomes in pregnant persons exposed to the medication during pregnancy.  It is unknown if this medication is excreted in breast milk.  Do not breastfeed during treatment and for about 4 weeks after the last dose. Clindamycin Counseling: I counseled the patient regarding use of clindamycin as an antibiotic for prophylactic and/or therapeutic purposes. Clindamycin is active against numerous classes of bacteria, including skin bacteria. Side effects may include nausea, diarrhea, gastrointestinal upset, rash, hives, yeast infections, and in rare cases, colitis. Spevigo Counseling: I discussed with the patient the risks of Spevigo including but not limited to fatigue, nasuea, vomiting, headache, pruritus, urinary tract infection, an infusion related reactions.  The patient understands that monitoring is required including screening for tuberculosis at baseline and yearly screening thereafter while continuing Spevigo therapy. They should contact us if symptoms of infection or other concerning signs are noted. Rinvoq Pregnancy And Lactation Text: Based on animal studies, Rinvoq may cause embryo-fetal harm when administered to pregnant women.  The medication should not be used in pregnancy.  Breastfeeding is not recommended during treatment and for 6 days after the last dose. Solaraze Counseling:  I discussed with the patient the risks of Solaraze including but not limited to erythema, scaling, itching, weeping, crusting, and pain. Otezla Counseling: The side effects of Otezla were discussed with the patient, including but not limited to worsening or new depression, weight loss, diarrhea, nausea, upper respiratory tract infection, and headache. Patient instructed to call the office should any adverse effect occur.  The patient verbalized understanding of the proper use and possible adverse effects of Otezla.  All the patient's questions and concerns were addressed. Terbinafine Counseling: Patient counseling regarding adverse effects of terbinafine including but not limited to headache, diarrhea, rash, upset stomach, liver function test abnormalities, itching, taste/smell disturbance, nausea, abdominal pain, and flatulence.  There is a rare possibility of liver failure that can occur when taking terbinafine.  The patient understands that a baseline LFT and kidney function test may be required. The patient verbalized understanding of the proper use and possible adverse effects of terbinafine.  All of the patient's questions and concerns were addressed. Xolair Counseling:  Patient informed of potential adverse effects including but not limited to fever, muscle aches, rash and allergic reactions.  The patient verbalized understanding of the proper use and possible adverse effects of Xolair.  All of the patient's questions and concerns were addressed. Topical Sulfur Applications Pregnancy And Lactation Text: This medication is considered safe during pregnancy and breast feeding secondary to limited systemic absorption. Bimzelx Pregnancy And Lactation Text: This medication crosses the placenta and the safety is uncertain during pregnancy. It is unknown if this medication is present in breast milk. 5-Fu Counseling: 5-Fluorouracil Counseling:  I discussed with the patient the risks of 5-fluorouracil including but not limited to erythema, scaling, itching, weeping, crusting, and pain. Finasteride Pregnancy And Lactation Text: This medication is absolutely contraindicated during pregnancy. It is unknown if it is excreted in breast milk. Bexarotene Pregnancy And Lactation Text: This medication is Pregnancy Category X and should not be given to women who are pregnant or may become pregnant. This medication should not be used if you are breast feeding. Zyclara Counseling:  I discussed with the patient the risks of imiquimod including but not limited to erythema, scaling, itching, weeping, crusting, and pain.  Patient understands that the inflammatory response to imiquimod is variable from person to person and was educated regarded proper titration schedule.  If flu-like symptoms develop, patient knows to discontinue the medication and contact us. Rituxan Pregnancy And Lactation Text: This medication is Pregnancy Category C and it isn't know if it is safe during pregnancy. It is unknown if this medication is excreted in breast milk but similar antibodies are known to be excreted. Albendazole Counseling:  I discussed with the patient the risks of albendazole including but not limited to cytopenia, kidney damage, nausea/vomiting and severe allergy.  The patient understands that this medication is being used in an off-label manner. Azelaic Acid Pregnancy And Lactation Text: This medication is considered safe during pregnancy and breast feeding. Niacinamide Counseling: I recommended taking niacin or niacinamide, also know as vitamin B3, twice daily. Recent evidence suggests that taking vitamin B3 (500 mg twice daily) can reduce the risk of actinic keratoses and non-melanoma skin cancers. Side effects of vitamin B3 include flushing and headache. Clindamycin Pregnancy And Lactation Text: This medication can be used in pregnancy if certain situations. Clindamycin is also present in breast milk. Topical Clindamycin Pregnancy And Lactation Text: This medication is Pregnancy Category B and is considered safe during pregnancy. It is unknown if it is excreted in breast milk. Otezla Pregnancy And Lactation Text: This medication is Pregnancy Category C and it isn't known if it is safe during pregnancy. It is unknown if it is excreted in breast milk. Picato Counseling:  I discussed with the patient the risks of Picato including but not limited to erythema, scaling, itching, weeping, crusting, and pain. Birth Control Pills Counseling: Birth Control Pill Counseling: I discussed with the patient the potential side effects of OCPs including but not limited to increased risk of stroke, heart attack, thrombophlebitis, deep venous thrombosis, hepatic adenomas, breast changes, GI upset, headaches, and depression.  The patient verbalized understanding of the proper use and possible adverse effects of OCPs. All of the patient's questions and concerns were addressed. Glycopyrrolate Counseling:  I discussed with the patient the risks of glycopyrrolate including but not limited to skin rash, drowsiness, dry mouth, difficulty urinating, and blurred vision. Humira Counseling:  I discussed with the patient the risks of adalimumab including but not limited to myelosuppression, immunosuppression, autoimmune hepatitis, demyelinating diseases, lymphoma, and serious infections.  The patient understands that monitoring is required including a PPD at baseline and must alert us or the primary physician if symptoms of infection or other concerning signs are noted. Spevigo Pregnancy And Lactation Text: The risk during pregnancy and breastfeeding is uncertain with this medication. This medication does cross the placenta. It is unknown if this medication is found in breast milk. Solaraze Pregnancy And Lactation Text: This medication is Pregnancy Category B and is considered safe. There is some data to suggest avoiding during the third trimester. It is unknown if this medication is excreted in breast milk. Wartpeel Counseling:  I discussed with the patient the risks of Wartpeel including but not limited to erythema, scaling, itching, weeping, crusting, and pain. Cimzia Counseling:  I discussed with the patient the risks of Cimzia including but not limited to immunosuppression, allergic reactions and infections.  The patient understands that monitoring is required including a PPD at baseline and must alert us or the primary physician if symptoms of infection or other concerning signs are noted. Sotyktu Counseling:  I discussed the most common side effects of Sotyktu including: common cold, sore throat, sinus infections, cold sores, canker sores, folliculitis, and acne.  I also discussed more serious side effects of Sotyktu including but not limited to: serious allergic reactions; increased risk for infections such as TB; cancers such as lymphomas; rhabdomyolysis and elevated CPK; and elevated triglycerides and liver enzymes.  Dutasteride Male Counseling: Dustasteride Counseling:  I discussed with the patient the risks of use of dutasteride including but not limited to decreased libido, decreased ejaculate volume, and gynecomastia. Women who can become pregnant should not handle medication.  All of the patient's questions and concerns were addressed. Xolair Pregnancy And Lactation Text: This medication is Pregnancy Category B and is considered safe during pregnancy. This medication is excreted in breast milk. Clofazimine Counseling:  I discussed with the patient the risks of clofazimine including but not limited to skin and eye pigmentation, liver damage, nausea/vomiting, gastrointestinal bleeding and allergy. Benzoyl Peroxide Counseling: Patient counseled that medicine may cause skin irritation and bleach clothing.  In the event of skin irritation, the patient was advised to reduce the amount of the drug applied or use it less frequently.   The patient verbalized understanding of the proper use and possible adverse effects of benzoyl peroxide.  All of the patient's questions and concerns were addressed. Klisyri Counseling:  I discussed with the patient the risks of Klisyri including but not limited to erythema, scaling, itching, weeping, crusting, and pain. Cibinqo Counseling: I discussed with the patient the risks of Cibinqo therapy including but not limited to common cold, nausea, headache, cold sores, increased blood CPK levels, dizziness, UTIs, fatigue, acne, and vomitting. Live vaccines should be avoided.  This medication has been linked to serious infections; higher rate of mortality; malignancy and lymphoproliferative disorders; major adverse cardiovascular events; thrombosis; thrombocytopenia and lymphopenia; lipid elevations; and retinal detachment. Quinolones Counseling:  I discussed with the patient the risks of fluoroquinolones including but not limited to GI upset, allergic reaction, drug rash, diarrhea, dizziness, photosensitivity, yeast infections, liver function test abnormalities, tendonitis/tendon rupture. Tranexamic Acid Counseling:  Patient advised of the small risk of bleeding problems with tranexamic acid. They were also instructed to call if they developed any nausea, vomiting or diarrhea. All of the patient's questions and concerns were addressed. Isotretinoin Counseling: Patient should get monthly blood tests, not donate blood, not drive at night if vision affected, not share medication, and not undergo elective surgery for 6 months after tx completed. Side effects reviewed, pt to contact office should one occur. Cyclophosphamide Counseling:  I discussed with the patient the risks of cyclophosphamide including but not limited to hair loss, hormonal abnormalities, decreased fertility, abdominal pain, diarrhea, nausea and vomiting, bone marrow suppression and infection. The patient understands that monitoring is required while taking this medication. Topical Ketoconazole Counseling: Patient counseled that this medication may cause skin irritation or allergic reactions.  In the event of skin irritation, the patient was advised to reduce the amount of the drug applied or use it less frequently.   The patient verbalized understanding of the proper use and possible adverse effects of ketoconazole.  All of the patient's questions and concerns were addressed. Siliq Counseling:  I discussed with the patient the risks of Siliq including but not limited to new or worsening depression, suicidal thoughts and behavior, immunosuppression, malignancy, posterior leukoencephalopathy syndrome, and serious infections.  The patient understands that monitoring is required including a PPD at baseline and must alert us or the primary physician if symptoms of infection or other concerning signs are noted. There is also a special program designed to monitor depression which is required with Siliq. Doxycycline Counseling:  Patient counseled regarding possible photosensitivity and increased risk for sunburn.  Patient instructed to avoid sunlight, if possible.  When exposed to sunlight, patients should wear protective clothing, sunglasses, and sunscreen.  The patient was instructed to call the office immediately if the following severe adverse effects occur:  hearing changes, easy bruising/bleeding, severe headache, or vision changes.  The patient verbalized understanding of the proper use and possible adverse effects of doxycycline.  All of the patient's questions and concerns were addressed. Fluconazole Counseling:  Patient counseled regarding adverse effects of fluconazole including but not limited to headache, diarrhea, nausea, upset stomach, liver function test abnormalities, taste disturbance, and stomach pain.  There is a rare possibility of liver failure that can occur when taking fluconazole.  The patient understands that monitoring of LFTs and kidney function test may be required, especially at baseline. The patient verbalized understanding of the proper use and possible adverse effects of fluconazole.  All of the patient's questions and concerns were addressed. Niacinamide Pregnancy And Lactation Text: These medications are considered safe during pregnancy. Imiquimod Counseling:  I discussed with the patient the risks of imiquimod including but not limited to erythema, scaling, itching, weeping, crusting, and pain.  Patient understands that the inflammatory response to imiquimod is variable from person to person and was educated regarded proper titration schedule.  If flu-like symptoms develop, patient knows to discontinue the medication and contact us. Stelara Counseling:  I discussed with the patient the risks of ustekinumab including but not limited to immunosuppression, malignancy, posterior leukoencephalopathy syndrome, and serious infections.  The patient understands that monitoring is required including a PPD at baseline and must alert us or the primary physician if symptoms of infection or other concerning signs are noted. Glycopyrrolate Pregnancy And Lactation Text: This medication is Pregnancy Category B and is considered safe during pregnancy. It is unknown if it is excreted breast milk. Birth Control Pills Pregnancy And Lactation Text: This medication should be avoided if pregnant and for the first 30 days post-partum. Oxybutynin Counseling:  I discussed with the patient the risks of oxybutynin including but not limited to skin rash, drowsiness, dry mouth, difficulty urinating, and blurred vision. Azithromycin Counseling:  I discussed with the patient the risks of azithromycin including but not limited to GI upset, allergic reaction, drug rash, diarrhea, and yeast infections. Klisyri Pregnancy And Lactation Text: It is unknown if this medication can harm a developing fetus or if it is excreted in breast milk. Sotyktu Pregnancy And Lactation Text: There is insufficient data to evaluate whether or not Sotyktu is safe to use during pregnancy.   It is not known if Sotyktu passes into breast milk and whether or not it is safe to use when breastfeeding.   Cyclophosphamide Pregnancy And Lactation Text: This medication is Pregnancy Category D and it isn't considered safe during pregnancy. This medication is excreted in breast milk. Drysol Counseling:  I discussed with the patient the risks of drysol/aluminum chloride including but not limited to skin rash, itching, irritation, burning. Soolantra Counseling: I discussed with the patients the risks of topial Soolantra. This is a medicine which decreases the number of mites and inflammation in the skin. You experience burning, stinging, eye irritation or allergic reactions.  Please call our office if you develop any problems from using this medication. Cimzia Pregnancy And Lactation Text: This medication crosses the placenta but can be considered safe in certain situations. Cimzia may be excreted in breast milk. Dutasteride Female Counseling: Dutasteride Counseling:  I discussed with the patient the risks of use of dutasteride including but not limited to decreased libido and sexual dysfunction. Explained the teratogenic nature of the medication and stressed the importance of not getting pregnant during treatment. All of the patient's questions and concerns were addressed. Erivedge Counseling- I discussed with the patient the risks of Erivedge including but not limited to nausea, vomiting, diarrhea, constipation, weight loss, changes in the sense of taste, decreased appetite, muscle spasms, and hair loss.  The patient verbalized understanding of the proper use and possible adverse effects of Erivedge.  All of the patient's questions and concerns were addressed. Cimetidine Counseling:  I discussed with the patient the risks of Cimetidine including but not limited to gynecomastia, headache, diarrhea, nausea, drowsiness, arrhythmias, pancreatitis, skin rashes, psychosis, bone marrow suppression and kidney toxicity. Tranexamic Acid Pregnancy And Lactation Text: It is unknown if this medication is safe during pregnancy or breast feeding. Isotretinoin Pregnancy And Lactation Text: This medication is Pregnancy Category X and is considered extremely dangerous during pregnancy. It is unknown if it is excreted in breast milk. Cibinqo Pregnancy And Lactation Text: It is unknown if this medication will adversely affect pregnancy or breast feeding.  You should not take this medication if you are currently pregnant or planning a pregnancy or while breastfeeding. Protopic Counseling: Patient may experience a mild burning sensation during topical application. Protopic is not approved in children less than 2 years of age. There have been case reports of hematologic and skin malignancies in patients using topical calcineurin inhibitors although causality is questionable. Detail Level: Simple Opioid Counseling: I discussed with the patient the potential side effects of opioids including but not limited to addiction, altered mental status, and depression. I stressed avoiding alcohol, benzodiazepines, muscle relaxants and sleep aids unless specifically okayed by a physician. The patient verbalized understanding of the proper use and possible adverse effects of opioids. All of the patient's questions and concerns were addressed. They were instructed to flush the remaining pills down the toilet if they did not need them for pain. Ivermectin Counseling:  Patient instructed to take medication on an empty stomach with a full glass of water.  Patient informed of potential adverse effects including but not limited to nausea, diarrhea, dizziness, itching, and swelling of the extremities or lymph nodes.  The patient verbalized understanding of the proper use and possible adverse effects of ivermectin.  All of the patient's questions and concerns were addressed. Doxycycline Pregnancy And Lactation Text: This medication is Pregnancy Category D and not consider safe during pregnancy. It is also excreted in breast milk but is considered safe for shorter treatment courses. Nsaids Counseling: NSAID Counseling: I discussed with the patient that NSAIDs should be taken with food. Prolonged use of NSAIDs can result in the development of stomach ulcers.  Patient advised to stop taking NSAIDs if abdominal pain occurs.  The patient verbalized understanding of the proper use and possible adverse effects of NSAIDs.  All of the patient's questions and concerns were addressed. Benzoyl Peroxide Pregnancy And Lactation Text: This medication is Pregnancy Category C. It is unknown if benzoyl peroxide is excreted in breast milk. Winlevi Counseling:  I discussed with the patient the risks of topical clascoterone including but not limited to erythema, scaling, itching, and stinging. Patient voiced their understanding. Cosentyx Counseling:  I discussed with the patient the risks of Cosentyx including but not limited to worsening of Crohn's disease, immunosuppression, allergic reactions and infections.  The patient understands that monitoring is required including a PPD at baseline and must alert us or the primary physician if symptoms of infection or other concerning signs are noted. Hyrimoz Counseling:  I discussed with the patient the risks of adalimumab including but not limited to myelosuppression, immunosuppression, autoimmune hepatitis, demyelinating diseases, lymphoma, and serious infections.  The patient understands that monitoring is required including a PPD at baseline and must alert us or the primary physician if symptoms of infection or other concerning signs are noted. Spironolactone Counseling: Patient advised regarding risks of diarrhea, abdominal pain, hyperkalemia, birth defects (for female patients), liver toxicity and renal toxicity. The patient may need blood work to monitor liver and kidney function and potassium levels while on therapy. The patient verbalized understanding of the proper use and possible adverse effects of spironolactone.  All of the patient's questions and concerns were addressed. Hydroxychloroquine Counseling:  I discussed with the patient that a baseline ophthalmologic exam is needed at the start of therapy and every year thereafter while on therapy. A CBC may also be warranted for monitoring.  The side effects of this medication were discussed with the patient, including but not limited to agranulocytosis, aplastic anemia, seizures, rashes, retinopathy, and liver toxicity. Patient instructed to call the office should any adverse effect occur.  The patient verbalized understanding of the proper use and possible adverse effects of Plaquenil.  All the patient's questions and concerns were addressed. Azithromycin Pregnancy And Lactation Text: This medication is considered safe during pregnancy and is also secreted in breast milk. Valtrex Counseling: I discussed with the patient the risks of valacyclovir including but not limited to kidney damage, nausea, vomiting and severe allergy.  The patient understands that if the infection seems to be worsening or is not improving, they are to call. Minoxidil Counseling: Minoxidil is a topical medication which can increase blood flow where it is applied. It is uncertain how this medication increases hair growth. Side effects are uncommon and include stinging and allergic reactions. Dutasteride Pregnancy And Lactation Text: This medication is absolutely contraindicated in women, especially during pregnancy and breast feeding. Feminization of male fetuses is possible if taking while pregnant. Xeljanz Counseling: I discussed with the patient the risks of Xeljanz therapy including increased risk of infection, liver issues, headache, diarrhea, or cold symptoms. Live vaccines should be avoided. They were instructed to call if they have any problems. Cyclosporine Counseling:  I discussed with the patient the risks of cyclosporine including but not limited to hypertension, gingival hyperplasia,myelosuppression, immunosuppression, liver damage, kidney damage, neurotoxicity, lymphoma, and serious infections. The patient understands that monitoring is required including baseline blood pressure, CBC, CMP, lipid panel and uric acid, and then 1-2 times monthly CMP and blood pressure. Soolantra Pregnancy And Lactation Text: This medication is Pregnancy Category C. This medication is considered safe during breast feeding.

## 2024-11-07 NOTE — ED ADULT NURSE NOTE - NSFALLRISKASMT_ED_ALL_ED_DT
WILLIAM BEHRLE  ----------------------------------------  The patient was seen earlier at bedside. Patient with anemia and duodenal/pancreatic mass. Reported episodes of nausea and vomiting.    Vital Signs Last 24 Hrs  T(C): 36.6 (07 Nov 2024 08:10), Max: 37.3 (06 Nov 2024 20:30)  T(F): 97.8 (07 Nov 2024 08:10), Max: 99.1 (06 Nov 2024 20:30)  HR: 68 (07 Nov 2024 08:10) (65 - 81)  BP: 146/60 (07 Nov 2024 08:10) (135/66 - 156/65)  BP(mean): --  RR: 18 (07 Nov 2024 08:10) (18 - 18)  SpO2: 99% (07 Nov 2024 08:10) (96% - 100%)    Parameters below as of 07 Nov 2024 08:10  Patient On (Oxygen Delivery Method): room air    PHYSICAL EXAMINATION:  ----------------------------------------  General appearance: No acute distress, Awake, Alert  HEENT: Normocephalic, Atraumatic, Conjunctiva clear, EOMI  Neck: Supple, No JVD, No tenderness  Lungs: Breath sound equal bilaterally, No wheezes, No rales  Cardiovascular: S1S2, Regular rhythm  Abdomen: Soft, Nontender, Nondistended, No guarding/rebound, Positive bowel sounds  Extremities: No clubbing, No cyanosis, No calf tenderness, Mild pedal edema  Neuro: Strength equal bilaterally, No tremors  Psychiatric: Appropriate mood, Normal affect    LABORATORY STUDIES:  ----------------------------------------             8.5    9.03  )-----------( 76       ( 07 Nov 2024 05:45 )             26.0     MEDICATIONS  (STANDING):  amLODIPine   Tablet 2.5 milliGRAM(s) Oral daily  carvedilol 6.25 milliGRAM(s) Oral every 12 hours  dextrose 5% + sodium chloride 0.9%. 1000 milliLiter(s) (75 mL/Hr) IV Continuous <Continuous>  glycerin Suppository - Adult 1 Suppository(s) Rectal at bedtime  pantoprazole Infusion 8 mG/Hr (10 mL/Hr) IV Continuous <Continuous>  rosuvastatin 10 milliGRAM(s) Oral at bedtime  senna 2 Tablet(s) Oral at bedtime  tamsulosin 0.4 milliGRAM(s) Oral at bedtime  timolol 0.5% Solution 1 Drop(s) Both EYES at bedtime    MEDICATIONS  (PRN):  acetaminophen     Tablet .. 650 milliGRAM(s) Oral every 6 hours PRN Temp greater or equal to 38C (100.4F), Mild Pain (1 - 3)  aluminum hydroxide/magnesium hydroxide/simethicone Suspension 30 milliLiter(s) Oral every 4 hours PRN Dyspepsia  melatonin 3 milliGRAM(s) Oral at bedtime PRN Insomnia  metoclopramide Injectable 5 milliGRAM(s) IV Push every 6 hours PRN nausea      ASSESSMENT / PLAN:  ----------------------------------------  81M with a history of coronary artery disease, gastroesophageal reflux, pancreatic mass, and glaucoma who presented to [facility] with hematemesis and melena found to have anemia requiring transfusion of packed red blood cells. CT of the abdomen was notable for an enlarged heterogenous pancreatic head, common bile duct dilation, duodenal thickening, gastric distention, and liver hypodensities. The patient was transfered to [facility] for evaluation and underwent endoscopy with findings of a duodenal mass. Packed red blood cells were transfused for recurrent anemia and the patient was thought to benefit from radiation therapy for tumor bleeding.    Acute blood loss anemia  - Endoscopy(11/1) noted old blood, ulcerated mucosa in the duodenal bulb, and duodenal mass  - Hemoglobin level improved with transfusions  - To continue on pantoprazole  - To avoid oral intake for now, on intravenous fluids  - Radiation Oncology evaluation noted, for radiation therapy today  - May ultimately require duodenal stent to relieve obstruction    Thrombocytopenia  - Monitoring platelet counts  - Stable    Pancreatic / Duodenal mass  - Prior MRI(10/29) noted few small poorly delineated hepatic lesions, likely metastases, 4.5 x 3.5 cm infiltrative pancreatic head mass invading the proximal duodenum, interruption of the intrapancreatic portion of the common bile and main pancreatic ducts at the level of the mass with only minimal upstream ductal dilatation  - CA 19-9 level was elevated  - Biopsy results noted adenocarcinoma  - Oncology evaluation noted    Coronary artery disease / Hypertension  - Aspirin held due to bleeding and anemia  - On amlodipine  - Lisinopril and spironolactone previously held due to acute kidney injury    Glaucoma  - On timolol eye drops        Dispo: Anticipate eventual discharge home pending improvement in anemia and tolerance of oral intake 16-Feb-2024 11:21

## 2024-12-09 PROCEDURE — 86708 HEPATITIS A ANTIBODY: CPT

## 2024-12-09 PROCEDURE — 36415 COLL VENOUS BLD VENIPUNCTURE: CPT

## 2024-12-09 PROCEDURE — 81003 URINALYSIS AUTO W/O SCOPE: CPT

## 2024-12-09 PROCEDURE — 85025 COMPLETE CBC W/AUTO DIFF WBC: CPT

## 2024-12-09 PROCEDURE — 71260 CT THORAX DX C+: CPT

## 2024-12-09 PROCEDURE — 74019 RADEX ABDOMEN 2 VIEWS: CPT

## 2024-12-09 PROCEDURE — 83735 ASSAY OF MAGNESIUM: CPT

## 2024-12-09 PROCEDURE — 82306 VITAMIN D 25 HYDROXY: CPT

## 2024-12-09 PROCEDURE — 86663 EPSTEIN-BARR ANTIBODY: CPT

## 2024-12-09 PROCEDURE — 83690 ASSAY OF LIPASE: CPT

## 2024-12-09 PROCEDURE — 84255 ASSAY OF SELENIUM: CPT

## 2024-12-09 PROCEDURE — 94640 AIRWAY INHALATION TREATMENT: CPT

## 2024-12-09 PROCEDURE — 36573 INSJ PICC RS&I 5 YR+: CPT

## 2024-12-09 PROCEDURE — 83540 ASSAY OF IRON: CPT

## 2024-12-09 PROCEDURE — 99285 EMERGENCY DEPT VISIT HI MDM: CPT

## 2024-12-09 PROCEDURE — C1786: CPT

## 2024-12-09 PROCEDURE — 0225U NFCT DS DNA&RNA 21 SARSCOV2: CPT

## 2024-12-09 PROCEDURE — 84439 ASSAY OF FREE THYROXINE: CPT

## 2024-12-09 PROCEDURE — 84436 ASSAY OF TOTAL THYROXINE: CPT

## 2024-12-09 PROCEDURE — 86038 ANTINUCLEAR ANTIBODIES: CPT

## 2024-12-09 PROCEDURE — 82272 OCCULT BLD FECES 1-3 TESTS: CPT

## 2024-12-09 PROCEDURE — 87181 SC STD AGAR DILUTION PER AGT: CPT

## 2024-12-09 PROCEDURE — 87581 M.PNEUMON DNA AMP PROBE: CPT

## 2024-12-09 PROCEDURE — 86900 BLOOD TYPING SEROLOGIC ABO: CPT

## 2024-12-09 PROCEDURE — 87103 BLOOD FUNGUS CULTURE: CPT

## 2024-12-09 PROCEDURE — 82550 ASSAY OF CK (CPK): CPT

## 2024-12-09 PROCEDURE — 87205 SMEAR GRAM STAIN: CPT

## 2024-12-09 PROCEDURE — 84133 ASSAY OF URINE POTASSIUM: CPT

## 2024-12-09 PROCEDURE — 82728 ASSAY OF FERRITIN: CPT

## 2024-12-09 PROCEDURE — 86850 RBC ANTIBODY SCREEN: CPT

## 2024-12-09 PROCEDURE — 84165 PROTEIN E-PHORESIS SERUM: CPT

## 2024-12-09 PROCEDURE — 97164 PT RE-EVAL EST PLAN CARE: CPT

## 2024-12-09 PROCEDURE — 74176 CT ABD & PELVIS W/O CONTRAST: CPT

## 2024-12-09 PROCEDURE — 84540 ASSAY OF URINE/UREA-N: CPT

## 2024-12-09 PROCEDURE — 82705 FATS/LIPIDS FECES QUAL: CPT

## 2024-12-09 PROCEDURE — 84156 ASSAY OF PROTEIN URINE: CPT

## 2024-12-09 PROCEDURE — 85384 FIBRINOGEN ACTIVITY: CPT

## 2024-12-09 PROCEDURE — 83521 IG LIGHT CHAINS FREE EACH: CPT

## 2024-12-09 PROCEDURE — C1769: CPT

## 2024-12-09 PROCEDURE — 97535 SELF CARE MNGMENT TRAINING: CPT

## 2024-12-09 PROCEDURE — 86922 COMPATIBILITY TEST ANTIGLOB: CPT

## 2024-12-09 PROCEDURE — 84443 ASSAY THYROID STIM HORMONE: CPT

## 2024-12-09 PROCEDURE — 83516 IMMUNOASSAY NONANTIBODY: CPT

## 2024-12-09 PROCEDURE — 93976 VASCULAR STUDY: CPT

## 2024-12-09 PROCEDURE — 97116 GAIT TRAINING THERAPY: CPT

## 2024-12-09 PROCEDURE — 82140 ASSAY OF AMMONIA: CPT

## 2024-12-09 PROCEDURE — 82610 CYSTATIN C: CPT

## 2024-12-09 PROCEDURE — 86665 EPSTEIN-BARR CAPSID VCA: CPT

## 2024-12-09 PROCEDURE — 47531 INJECTION FOR CHOLANGIOGRAM: CPT

## 2024-12-09 PROCEDURE — 97168 OT RE-EVAL EST PLAN CARE: CPT

## 2024-12-09 PROCEDURE — 82803 BLOOD GASES ANY COMBINATION: CPT

## 2024-12-09 PROCEDURE — 87340 HEPATITIS B SURFACE AG IA: CPT

## 2024-12-09 PROCEDURE — 84484 ASSAY OF TROPONIN QUANT: CPT

## 2024-12-09 PROCEDURE — 83010 ASSAY OF HAPTOGLOBIN QUANT: CPT

## 2024-12-09 PROCEDURE — 87075 CULTR BACTERIA EXCEPT BLOOD: CPT

## 2024-12-09 PROCEDURE — 76705 ECHO EXAM OF ABDOMEN: CPT

## 2024-12-09 PROCEDURE — 83935 ASSAY OF URINE OSMOLALITY: CPT

## 2024-12-09 PROCEDURE — 82247 BILIRUBIN TOTAL: CPT

## 2024-12-09 PROCEDURE — 95700 EEG CONT REC W/VID EEG TECH: CPT

## 2024-12-09 PROCEDURE — A9585: CPT

## 2024-12-09 PROCEDURE — 82607 VITAMIN B-12: CPT

## 2024-12-09 PROCEDURE — 83550 IRON BINDING TEST: CPT

## 2024-12-09 PROCEDURE — 84478 ASSAY OF TRIGLYCERIDES: CPT

## 2024-12-09 PROCEDURE — 86706 HEP B SURFACE ANTIBODY: CPT

## 2024-12-09 PROCEDURE — 82652 VIT D 1 25-DIHYDROXY: CPT

## 2024-12-09 PROCEDURE — 86709 HEPATITIS A IGM ANTIBODY: CPT

## 2024-12-09 PROCEDURE — 93970 EXTREMITY STUDY: CPT

## 2024-12-09 PROCEDURE — 76770 US EXAM ABDO BACK WALL COMP: CPT

## 2024-12-09 PROCEDURE — 71275 CT ANGIOGRAPHY CHEST: CPT

## 2024-12-09 PROCEDURE — 83605 ASSAY OF LACTIC ACID: CPT

## 2024-12-09 PROCEDURE — 84466 ASSAY OF TRANSFERRIN: CPT

## 2024-12-09 PROCEDURE — 71250 CT THORAX DX C-: CPT

## 2024-12-09 PROCEDURE — 82595 ASSAY OF CRYOGLOBULIN: CPT

## 2024-12-09 PROCEDURE — 87015 SPECIMEN INFECT AGNT CONCNTJ: CPT

## 2024-12-09 PROCEDURE — 80076 HEPATIC FUNCTION PANEL: CPT

## 2024-12-09 PROCEDURE — 85045 AUTOMATED RETICULOCYTE COUNT: CPT

## 2024-12-09 PROCEDURE — 87186 SC STD MICRODIL/AGAR DIL: CPT

## 2024-12-09 PROCEDURE — 93005 ELECTROCARDIOGRAM TRACING: CPT

## 2024-12-09 PROCEDURE — 83615 LACTATE (LD) (LDH) ENZYME: CPT

## 2024-12-09 PROCEDURE — C8929: CPT

## 2024-12-09 PROCEDURE — 87449 NOS EACH ORGANISM AG IA: CPT

## 2024-12-09 PROCEDURE — 86376 MICROSOMAL ANTIBODY EACH: CPT

## 2024-12-09 PROCEDURE — 80048 BASIC METABOLIC PNL TOTAL CA: CPT

## 2024-12-09 PROCEDURE — 84630 ASSAY OF ZINC: CPT

## 2024-12-09 PROCEDURE — C1729: CPT

## 2024-12-09 PROCEDURE — 82248 BILIRUBIN DIRECT: CPT

## 2024-12-09 PROCEDURE — 94660 CPAP INITIATION&MGMT: CPT

## 2024-12-09 PROCEDURE — 85027 COMPLETE CBC AUTOMATED: CPT

## 2024-12-09 PROCEDURE — 86803 HEPATITIS C AB TEST: CPT

## 2024-12-09 PROCEDURE — 87641 MR-STAPH DNA AMP PROBE: CPT

## 2024-12-09 PROCEDURE — 81001 URINALYSIS AUTO W/SCOPE: CPT

## 2024-12-09 PROCEDURE — 93306 TTE W/DOPPLER COMPLETE: CPT

## 2024-12-09 PROCEDURE — 71045 X-RAY EXAM CHEST 1 VIEW: CPT

## 2024-12-09 PROCEDURE — 86036 ANCA SCREEN EACH ANTIBODY: CPT

## 2024-12-09 PROCEDURE — 83785 ASSAY OF MANGANESE: CPT

## 2024-12-09 PROCEDURE — 84100 ASSAY OF PHOSPHORUS: CPT

## 2024-12-09 PROCEDURE — 87635 SARS-COV-2 COVID-19 AMP PRB: CPT

## 2024-12-09 PROCEDURE — 84550 ASSAY OF BLOOD/URIC ACID: CPT

## 2024-12-09 PROCEDURE — 84155 ASSAY OF PROTEIN SERUM: CPT

## 2024-12-09 PROCEDURE — 76775 US EXAM ABDO BACK WALL LIM: CPT

## 2024-12-09 PROCEDURE — 82533 TOTAL CORTISOL: CPT

## 2024-12-09 PROCEDURE — 80307 DRUG TEST PRSMV CHEM ANLYZR: CPT

## 2024-12-09 PROCEDURE — 80061 LIPID PANEL: CPT

## 2024-12-09 PROCEDURE — 83930 ASSAY OF BLOOD OSMOLALITY: CPT

## 2024-12-09 PROCEDURE — 87116 MYCOBACTERIA CULTURE: CPT

## 2024-12-09 PROCEDURE — 86901 BLOOD TYPING SEROLOGIC RH(D): CPT

## 2024-12-09 PROCEDURE — 87206 SMEAR FLUORESCENT/ACID STAI: CPT

## 2024-12-09 PROCEDURE — 90662 IIV NO PRSV INCREASED AG IM: CPT

## 2024-12-09 PROCEDURE — 86334 IMMUNOFIX E-PHORESIS SERUM: CPT

## 2024-12-09 PROCEDURE — 87150 DNA/RNA AMPLIFIED PROBE: CPT

## 2024-12-09 PROCEDURE — 84134 ASSAY OF PREALBUMIN: CPT

## 2024-12-09 PROCEDURE — C1894: CPT

## 2024-12-09 PROCEDURE — 86225 DNA ANTIBODY NATIVE: CPT

## 2024-12-09 PROCEDURE — P9047: CPT

## 2024-12-09 PROCEDURE — 86664 EPSTEIN-BARR NUCLEAR ANTIGEN: CPT

## 2024-12-09 PROCEDURE — 95705 EEG W/O VID 2-12 HR UNMNTR: CPT

## 2024-12-09 PROCEDURE — 97110 THERAPEUTIC EXERCISES: CPT

## 2024-12-09 PROCEDURE — 85520 HEPARIN ASSAY: CPT

## 2024-12-09 PROCEDURE — 82570 ASSAY OF URINE CREATININE: CPT

## 2024-12-09 PROCEDURE — 87070 CULTURE OTHR SPECIMN AEROBIC: CPT

## 2024-12-09 PROCEDURE — 86704 HEP B CORE ANTIBODY TOTAL: CPT

## 2024-12-09 PROCEDURE — 87184 SC STD DISK METHOD PER PLATE: CPT

## 2024-12-09 PROCEDURE — 86255 FLUORESCENT ANTIBODY SCREEN: CPT

## 2024-12-09 PROCEDURE — 87040 BLOOD CULTURE FOR BACTERIA: CPT

## 2024-12-09 PROCEDURE — 95708 EEG WO VID EA 12-26HR UNMNTR: CPT

## 2024-12-09 PROCEDURE — 82180 ASSAY OF ASCORBIC ACID: CPT

## 2024-12-09 PROCEDURE — 93975 VASCULAR STUDY: CPT

## 2024-12-09 PROCEDURE — P9016: CPT

## 2024-12-09 PROCEDURE — 87640 STAPH A DNA AMP PROBE: CPT

## 2024-12-09 PROCEDURE — 74183 MRI ABD W/O CNTR FLWD CNTR: CPT

## 2024-12-09 PROCEDURE — 82962 GLUCOSE BLOOD TEST: CPT

## 2024-12-09 PROCEDURE — 85610 PROTHROMBIN TIME: CPT

## 2024-12-09 PROCEDURE — 36430 TRANSFUSION BLD/BLD COMPNT: CPT

## 2024-12-09 PROCEDURE — 83880 ASSAY OF NATRIURETIC PEPTIDE: CPT

## 2024-12-09 PROCEDURE — 86160 COMPLEMENT ANTIGEN: CPT

## 2024-12-09 PROCEDURE — 84166 PROTEIN E-PHORESIS/URINE/CSF: CPT

## 2024-12-09 PROCEDURE — 86705 HEP B CORE ANTIBODY IGM: CPT

## 2024-12-09 PROCEDURE — 84480 ASSAY TRIIODOTHYRONINE (T3): CPT

## 2024-12-09 PROCEDURE — 70450 CT HEAD/BRAIN W/O DYE: CPT

## 2024-12-09 PROCEDURE — 80053 COMPREHEN METABOLIC PANEL: CPT

## 2024-12-09 PROCEDURE — 82746 ASSAY OF FOLIC ACID SERUM: CPT

## 2024-12-09 PROCEDURE — 83036 HEMOGLOBIN GLYCOSYLATED A1C: CPT

## 2024-12-09 PROCEDURE — 82525 ASSAY OF COPPER: CPT

## 2024-12-09 PROCEDURE — 74018 RADEX ABDOMEN 1 VIEW: CPT

## 2024-12-09 PROCEDURE — 84300 ASSAY OF URINE SODIUM: CPT

## 2024-12-09 PROCEDURE — 74177 CT ABD & PELVIS W/CONTRAST: CPT

## 2024-12-09 PROCEDURE — 84145 PROCALCITONIN (PCT): CPT

## 2024-12-09 PROCEDURE — 85730 THROMBOPLASTIN TIME PARTIAL: CPT

## 2024-12-09 PROCEDURE — P9045: CPT

## 2024-12-09 PROCEDURE — 47490 INCISION OF GALLBLADDER: CPT

## 2024-12-09 PROCEDURE — 86920 COMPATIBILITY TEST SPIN: CPT

## 2024-12-09 PROCEDURE — 97530 THERAPEUTIC ACTIVITIES: CPT

## 2024-12-10 NOTE — ED PROVIDER NOTE - CADM POA URETHRAL CATHETER
"[facility] Note    Subjective:     Patient ID: Eduin Ortiz is a 65 y.o. male    Chief Complaint:   Chief Complaint   Patient presents with    Medication Refill       HPI  Eduin Ortiz is a 65 y.o. male who presents for follow-up for his hypertension and other conditions..    He has been doing well overall lately.  He was not taking the antifungal for his torso rash because of concerns of interactions with the atorvastatin.  The rash has not improved or changed significantly.  We will pressure has been well controlled.  He has not had any headaches, chest pain, shortness for breath, dizziness, or blurry vision.  His gout has been well controlled with his current regime.  He follows with Dr. Moran for his history of prostate cancer.  He is having a PSA drawn every 3 months.       Anh - Rad Onc  Shaniqua - Urology  Dr. Medrano - Colonoscopy     Current Outpatient Medications   Medication Instructions    allopurinoL (ZYLOPRIM) 300 mg, Oral, Daily    atorvastatin (LIPITOR) 40 mg, Oral, Daily    colchicine (COLCRYS) 0.6 mg tablet Take 2 tabs by mouth at start of gout flare followed by 1 tab one hour later    diclofenac (VOLTAREN) 75 mg, Oral, 2 times daily    fluconazole (DIFLUCAN) 300 mg, Oral, Weekly    hydroCHLOROthiazide (HYDRODIURIL) 25 mg, Oral, Daily    lisinopriL (PRINIVIL,ZESTRIL) 40 mg, Oral, Daily    tadalafiL (CIALIS) 20 mg, Oral, Daily PRN     Review of Systems  As per HPI    Objective:         9/4/2024     8:34 AM 11/11/2024    11:23 AM 12/10/2024     1:08 PM   Vitals - 1 value per visit   SYSTOLIC 124 122 146   DIASTOLIC 71 70 77   Pulse 87 75 85   Temp 98.2 °F (36.8 °C) 97.9 °F (36.6 °C) 98.4 °F (36.9 °C)   SPO2 99 % 98 % 98 %   Weight (lb) 232.8 239.8 246.4   Weight (kg) 105.597 108.773 111.766   Height 5' 9" (1.753 m) 5' 9" (1.753 m) 5' 9" (1.753 m)   BMI (Calculated) 34.4 35.4 36.4   Pain Score Zero Zero      Physical Exam  Constitutional:       General: He is not in acute " distress.     Appearance: He is not ill-appearing.   Cardiovascular:      Rate and Rhythm: Normal rate and regular rhythm.      Heart sounds: No murmur heard.  Pulmonary:      Effort: No respiratory distress.      Breath sounds: Normal breath sounds. No wheezing.   Abdominal:      Palpations: Abdomen is soft.   Skin:     General: Skin is warm.      Capillary Refill: Capillary refill takes less than 2 seconds.      Findings: Rash present.      Comments: Similar to prior visit ; multiple round patches that are slightly raised and erythematous    Neurological:      Mental Status: Mental status is at baseline.      Motor: No weakness.   Psychiatric:         Mood and Affect: Mood normal.        Latest Reference Range & Units 12/10/24 15:50   Sodium 136 - 145 mmol/L 141   Potassium 3.5 - 5.1 mmol/L 3.9   Chloride 98 - 107 mmol/L 107   CO2 23 - 31 mmol/L 23   Anion Gap mEq/L 11.0   BUN 8.4 - 25.7 mg/dL 23.4   Creatinine 0.72 - 1.25 mg/dL 1.07   BUN/CREAT RATIO  22   eGFR mL/min/1.73/m2 >60   Glucose 82 - 115 mg/dL 102   Calcium 8.8 - 10.0 mg/dL 9.8   ALP 40 - 150 unit/L 75   PROTEIN TOTAL 5.8 - 7.6 gm/dL 8.2 (H)   Albumin 3.4 - 4.8 g/dL 4.2   Albumin/Globulin Ratio 1.1 - 2.0 ratio 1.1   Uric Acid 3.5 - 7.2 mg/dL 6.5   BILIRUBIN TOTAL <=1.5 mg/dL 0.3   AST 5 - 34 unit/L 27   ALT 0 - 55 unit/L 47   Globulin, Total 2.4 - 3.5 gm/dL 4.0 (H)   (H): Data is abnormally high      Assessment & Plan      Primary hypertension  Well controlled with current management  Refilling meds  -     lisinopriL (PRINIVIL,ZESTRIL) 40 MG tablet; Take 1 tablet (40 mg total) by mouth once daily.  Dispense: 90 tablet; Refill: 1  -     hydroCHLOROthiazide (HYDRODIURIL) 25 MG tablet; Take 1 tablet (25 mg total) by mouth once daily.  Dispense: 90 tablet; Refill: 1  -     Comprehensive Metabolic Panel; Future; Expected date: 12/10/2024  -     Urinalysis, Reflex to Urine Culture    Hyperlipidemia, unspecified hyperlipidemia type  Will refill  medications  Assessing LFTs with CMP  -     atorvastatin (LIPITOR) 40 MG tablet; Take 1 tablet (40 mg total) by mouth once daily.  Dispense: 90 tablet; Refill: 1    Other secondary acute gout of multiple sites  Allopurinol refilled  -     allopurinoL (ZYLOPRIM) 300 MG tablet; Take 1 tablet (300 mg total) by mouth once daily.  Dispense: 90 tablet; Refill: 1  -     Uric Acid; Future; Expected date: 12/10/2024    Tinea versicolor  Will give fluconazole 300mg weekly for 2 weeks  Patient can stop Lipitor while taking the antifungal   -     fluconazole (DIFLUCAN) 150 MG Tab; Take 2 tablets (300 mg total) by mouth once a week. for 14 days  Dispense: 4 tablet; Refill: 0    Preventative health care  Shingrix vaccine given  Round 2/2 due in 2-6 months  Not interested in influenza vaccine  Colonoscopy done by Dr. Medrano  -     varicella zoster (Shingrix) IM vaccine (>/= 49 yo)          Orders Placed This Encounter    Comprehensive Metabolic Panel    Urinalysis, Reflex to Urine Culture    Uric Acid    lisinopriL (PRINIVIL,ZESTRIL) 40 MG tablet    hydroCHLOROthiazide (HYDRODIURIL) 25 MG tablet    atorvastatin (LIPITOR) 40 MG tablet    allopurinoL (ZYLOPRIM) 300 MG tablet    varicella zoster (Shingrix) IM vaccine (>/= 49 yo)    fluconazole (DIFLUCAN) 150 MG Tab     Health Maintenance   Topic Date Due    TETANUS VACCINE  Never done    Colorectal Cancer Screening  Never done    RSV Vaccine (Age 60+ and Pregnant patients) (1 - Risk 60-74 years 1-dose series) Never done    COVID-19 Vaccine (2 - Nazario risk series) 06/16/2021    Influenza Vaccine (1) 09/01/2024    Shingles Vaccine (2 of 2) 02/04/2025    Hemoglobin A1c (Diabetic Prevention Screening)  09/04/2027    Lipid Panel  09/04/2029    Hepatitis C Screening  Completed    HIV Screening  Completed     Future Appointments   Date Time Provider Department Center   2/13/2025 11:30 AM Sissy Moran DO [facility] UROLO [place]    4/21/2025  8:20 AM Prabhu Mcintosh MD [facility] FM RES  [place]     RTC in 3-4 mo    Prabhu Mcintosh MD  [facility], PGY-2              No

## 2025-02-03 NOTE — PROGRESS NOTE ADULT - SUBJECTIVE AND OBJECTIVE BOX
SUBJECTIVE: Patient seen and examined at bedside. No complaints.    amLODIPine   Tablet 10 milliGRAM(s) Oral daily  heparin   Injectable 5000 Unit(s) SubCutaneous every 8 hours  metoprolol tartrate Injectable 5 milliGRAM(s) IV Push every 6 hours    MEDICATIONS  (PRN):  benzocaine/menthol Lozenge 2 Lozenge Oral every 4 hours PRN Sore Throat  ondansetron Injectable 4 milliGRAM(s) IV Push every 8 hours PRN Nausea and/or Vomiting      I&O's Detail    02 Oct 2023 07:01  -  03 Oct 2023 07:00  --------------------------------------------------------  IN:    Fat Emulsion (Fish Oil &amp; Plant Based) 20% Infusion: 500.4 mL    Oral Fluid: 120 mL    TPN (Total Parenteral Nutrition): 1992 mL  Total IN: 2612.4 mL    OUT:    Bulb (mL): 80 mL    Drain (mL): 80 mL    Drain (mL): 315 mL    Drain (mL): 735 mL    Voided (mL): 2075 mL  Total OUT: 3285 mL    Total NET: -672.6 mL      03 Oct 2023 07:01  -  03 Oct 2023 08:40  --------------------------------------------------------  IN:    TPN (Total Parenteral Nutrition): 83 mL  Total IN: 83 mL    OUT:  Total OUT: 0 mL    Total NET: 83 mL          T(C): 37 (10-03-23 @ 08:32), Max: 37 (10-03-23 @ 05:36)  HR: 88 (10-03-23 @ 07:11) (81 - 89)  BP: 123/59 (10-03-23 @ 07:11) (104/57 - 151/69)  RR: 26 (10-03-23 @ 07:11) (12 - 29)  SpO2: 100% (10-03-23 @ 07:11) (98% - 100%)    General: NAD, resting comfortably in bed  C/V: NSR  Pulm: Nonlabored breathing, no respiratory distress  Abd: Soft, ND, NT, no rebound tenderness, no guarding. Wound manager over midline wound with fistula/ostomy. RLQ ileostomy, perc cony, JOYCE in LUQ. LUQ fistula/ostomy.        LABS:                        8.9    8.41  )-----------( 190      ( 03 Oct 2023 06:47 )             28.3     10-03    135  |  100  |  36<H>  ----------------------------<  114<H>  3.9   |  28  |  1.31<H>    Ca    8.7      03 Oct 2023 06:47  Phos  3.4     10-03  Mg     2.1     10-03    TPro  8.2  /  Alb  2.5<L>  /  TBili  3.2<H>  /  DBili  x   /  AST  72<H>  /  ALT  81<H>  /  AlkPhos  158<H>  10-03      Urinalysis Basic - ( 03 Oct 2023 06:47 )    Color: x / Appearance: x / SG: x / pH: x  Gluc: 114 mg/dL / Ketone: x  / Bili: x / Urobili: x   Blood: x / Protein: x / Nitrite: x   Leuk Esterase: x / RBC: x / WBC x   Sq Epi: x / Non Sq Epi: x / Bacteria: x        RADIOLOGY & ADDITIONAL STUDIES:     No

## 2025-03-24 NOTE — PROGRESS NOTE ADULT - SUBJECTIVE AND OBJECTIVE BOX
Problem: Discharge Planning  Goal: Discharge to home or other facility with appropriate resources  3/23/2025 2308 by Flako Castillo LPN  Outcome: Progressing  3/23/2025 1304 by Aixa Blair, RN  Outcome: Progressing     Problem: Safety - Adult  Goal: Free from fall injury  3/23/2025 2308 by Flako Castillo LPN  Outcome: Progressing  3/23/2025 1304 by Aixa Blair, RN  Outcome: Progressing     Problem: Pain  Goal: Verbalizes/displays adequate comfort level or baseline comfort level  3/23/2025 2308 by Flako Castillo LPN  Outcome: Progressing  3/23/2025 1304 by Aixa Blair, RN  Outcome: Progressing      78 M w/ Crohn's, AFib/Flutter s/p DCCVs on amiodarone, remote ileocectomy and open appendectomy. Admitted (6/23) for SBO vs Crohns flare, s/p NGT decompression and s/p lap TRE converted to open TRE, SBR x 3, left in discontinuity with abthera vac on (6/27), RTOR for ileocolic resection, small bowel anastomosis, and abdominal wall closure on (6/28), c/b fluid collection s/p IR aspiration of perihepatic fluid on (7/3), c/b wound dehiscence s/p RTOR exlap, washout, ileocolic resection with end ileostomy, blow hole colostomy, red rubber from ileostomy to small bowel anastomosis; vicryl bridging mesh on (7/5) transferred to SICU postoperatively for hemodynamic monitoring, with hospital course complicated by periods of AMS which resolved but now stepped back up for to SICU on 9/10 for acute AMS, intermittent hypoglycemia, AFib with RVR. CT abd 9/10 showing distended gallbladder with sludge.  S/P IR percutaneous cholecystostomy placement 9/11/23.    Perc cony:  735 ml bilious output/24h (prior 24h 520ml). Dressing c/d/i. Flushed easily with 3cc NS.     -Continue to monitor output.  -IR to follow.

## 2025-03-29 NOTE — ED ADULT NURSE NOTE - CHIEF COMPLAINT QUOTE
no
x 3 days of dyspnea upon exertion and fatigue. PMH crohn's, afib/flutter, cardioverted x 3 times.

## 2025-06-02 NOTE — PROGRESS NOTE ADULT - ASSESSMENT
78M w/ Crohn's, AFib/Flutter s/p DCCVs on amiodarone, remote ileocectomy and open appy here for SBO vs Crohns flare, s/p NGT decompression and now s/p lap TRE converted to open TRE, SBR x 3, left in discontinuity with abthera vac on 6/27, RTOR for ileocolic resection, small bowel anastomosis, and abdominal wall closure on 6/28, and s/p IR aspiration of perihepatic fluid on 7/3, stepped down to telemetery on 7/4. wound dehisence on 7/5, RTOR exlap, washout, ileocolic resection with end ileostomy, blow hole colostomy, red rubber from ileostomy to small bowel anastomosis; vicryl bridging mesh on 7/5. Transferred to SICU post op for HD monitoring. Extubated 7/6.    Plan:   - PRN pain control   - Hx of A-fib/flutter; continue rate and rhythm control   - Encourage IS/OOB   - NPO w/Sips & chips; continue TPN   - Wound vac change tomorrow   - Appreciate ostomy appliance, wound manager and wound vac care per wound care team and RN   - Improving leukocytosis off Abx; continue Nystatin topical   - DVT mechanical and chemo prophylaxis   - Continued care per SICU team        Plan discussed with chief resident and attending, Dr. Knapp 170.1

## 2025-07-18 NOTE — H&P ADULT - NSHPSOURCEINFORD_GEN_ALL_CORE
His only complaint is abd pain.   Sleepy.       Physical Exam  Constitutional: Appears cachectic.  HENT: MMM, OP clear  Head: Normocephalic.   Mouth/Throat: Oropharynx is clear and moist.   Eyes: EOM are normal. No scleral icterus.   Neck: No JVD present. No thyromegaly present.   Cardiovascular: Irregular. Tachycardic.  Pulmonary/Chest: Effort normal.  has no wheezes.   Abdominal: Distended.  Musculoskeletal: exhibits no effusion.   Lymphadenopathy:  No occipital adenopathy present.   Neurological: alert.   Skin: Skin is warm.   Psychiatric:  speech is normal.      Last Recorded Vitals      Vital Last Value 24 Hour Range   Temperature 97.5 °F (36.4 °C) (07/18/25 0422) Temp  Min: 97.5 °F (36.4 °C)  Max: 99.5 °F (37.5 °C)   Pulse 99 (07/18/25 0920) Pulse  Min: 99  Max: 142   Respiratory 18 (07/18/25 0422) Resp  Min: 14  Max: 20   Non-Invasive  Blood Pressure 124/83 (07/18/25 0920) BP  Min: 108/62  Max: 125/76   Pulse Oximetry 97 % (07/18/25 0920) SpO2  Min: 94 %  Max: 98 %   Arterial   Blood Pressure   No data recorded         Relevant Results.      Intake/Output Summary (Last 24 hours) at 7/18/2025 0922  Last data filed at 7/18/2025 0500  Gross per 24 hour   Intake --   Output 250 ml   Net -250 ml       I have personally reviewed and interpreted the pertinent imaging and lab study reports. These are the pertinent findings:    TTE (3/2025):  STUDY CONCLUSIONS  SUMMARY:  normal LV function     1. Left ventricle: The cavity size is normal. Wall thickness is normal.     Systolic function is at the lower limits of normal. The ejection fraction     was measured by biplane method of disks. The ejection fraction is 51%.  2. Aortic valve: There is a Zee YOLY 3 Ultra Resilia, 23 mm     transcatheter valve that is well seated in the aortic valve position. The     peak systolic velocity is 1.7m/sec. The acceleration time is 92ms. The mean     systolic gradient is 5mm Hg. The peak systolic gradient is 11mm Hg. The      valve area is 1.8cm^2.  3. Right ventricle: The cavity size is severely dilated. Wall thickness is     normal. Systolic function is moderately reduced.  4. Right atrium: The atrium is massively dilated. The estimated central venous     pressure is 15mm Hg.  5. Tricuspid valve: There is wide-open regurgitation.  6. Inferior vena cava: The IVC is dilated.  7. Pericardium, extracardiac: There is a left pleural effusion.    Telemetry: atrial fibrillation with mild tachy 100-110    EKG (7/17/25): atrial fibrillation with RVR    Cath 5/7/2024: Stable chronic CAD.  SVG to OM patent.  SVG to RPDA patent.  RCA .  D2 80% disease.  Right femoral arterial approach, Perclose.     Cardiac Labs  Recent Labs   Lab 07/17/25  0909   NTPROB 6,447*       CMP  Recent Labs   Lab 07/18/25  0722 07/17/25  0915 07/17/25  0909   SODIUM 139 141 142   POTASSIUM 4.1 5.1 3.9   CHLORIDE 102 104 102   CO2 26 28 32   GLUCOSE 99 87 88   BUN 48* 36* 36*   CREATININE 1.47* 1.31* 1.42*   CALCIUM 9.0 8.2* 8.8   TOTPROTEIN 7.2  --  7.5   ALBUMIN 2.6*  --  2.8*   BILIRUBIN 7.5*  --  6.0*   AST 40*  --  58*   GPT 27  --  39   ALKPT 170*  --  184*       CBC  Recent Labs   Lab 07/18/25  0722 07/17/25  0909   WBC 10.2 6.8   HCT 32.5* 31.9*   HGB 10.5* 10.3*    232       Coags  Recent Labs   Lab 07/17/25  0909   INR 1.6   PTT 29       Assessment/Plan:  Everardo is a 89 year old male presenting with generalized weakness and abdominal discomfort. He has severe bicuspid aortic stenosis and CAD status post bioprosthetic AVR with 23 mm Mitroflow and SVG to OM, SVG to RCA with modified maze 5/2010, ischemic cardiomyopathy, severe TR, persistent atrial fibrillation, HTN, HLD, CKD stage IIIa. He is status post TAV-in-AILEEN in 6/2024. CT abdomen with moderate volume ascities and diffuse circumferential thickening of the bowel loops. He is planned for paracentesis. He was also noted to be in atrial fibrillation with RVR.     Atrial fibrillation with RVR: rates  imrpoved to <110. Continue amio 100.  No anticoag due to bleeding. No need changes at this time due to chronic low bp.   Acute on chronic diastolic heart failure: Right sided failure. Has known wide-open TR with severe RV dysfunction. Appears stable at this time. Continue current meds. Not able to make significant changes. Mechanical  issue with TR>  continue home diuretic.   Hx of VT: During last hospitalization. Decision made then to not pursue ICD. He is on amiodarone 100mg daily which will be continued.  Severe bicuspid AS complicated by bioprosthetic valve stenosis: S/p TAV-inSAV in 2024.  CAD s/p CABG: Troponin elevated but no chest pain. Demanda ischemia. Not candidate for intervention anyway.  TAA: 4.4cm noted on TAVR CTA. Conservative management pursued.  Severe TR: See above. This is longstanding. Change to PO diureitc   CKD: Keep close eye on creatinine with diuresis.  Goals of care: Given age, multiple co-morbidities (including end-stage right sided heart failure) and multiple hospitalizations palliative care should be strongly considered.    Dw dr Hall.     Consider palilative/ hospice care as prognosis is poor.     Italo Russ MD, formerly [facility]  Medical Director Cardiology- [facility]  [facility]  [facility]                     Chart(s)/Patient

## 2025-08-01 NOTE — ED ADULT NURSE NOTE - OBJECTIVE STATEMENT
Superficial laceration of skin Patient is a 78yoM presenting to the ED for low hgb. Patient is axox3, spont breathing on RA. Pt states yesterday there was some bleeding at his ostomy site which patient and aide was able to stop. Pt had blood work done after that and was told to come in as patient had lo hgb. Patient denies pain at the ostomy site, denies abd pain, denies nausea/vomiting. Denies f/c/c.

## (undated) DEVICE — POSITIONER FOAM EGG CRATE ULNAR 2PCS (PINK)

## (undated) DEVICE — DRAIN JACKSON PRATT 19FR ROUND END W TROCAR

## (undated) DEVICE — RETRACTOR LAPAROSCOPIC ALEXIS MEDIUM

## (undated) DEVICE — PACK GENERAL CLOSING

## (undated) DEVICE — Device

## (undated) DEVICE — SUT PDS II 1 27" CT-1

## (undated) DEVICE — GLV 8 PROTEXIS (WHITE)

## (undated) DEVICE — LIGASURE MARYLAND 37CM

## (undated) DEVICE — GLV 8.5 PROTEXIS (WHITE)

## (undated) DEVICE — PACK EXPLORATORY LAPAROTOMY

## (undated) DEVICE — LIGASURE IMPACT

## (undated) DEVICE — URETERAL CATH RED RUBBER 20FR (YELLOW)

## (undated) DEVICE — SPONGE ENDO PEANUT 5MM

## (undated) DEVICE — DRAIN RESERVOIR FOR JACKSON PRATT 100CC CARDINAL

## (undated) DEVICE — VENODYNE/SCD SLEEVE CALF MEDIUM

## (undated) DEVICE — DRSG VAC ABTHERS

## (undated) DEVICE — TAPE SILK 3"

## (undated) DEVICE — SUT VICRYL 2-0 18" TIES

## (undated) DEVICE — DRSG VAC GRANUFOAM LARGE (BLACK)

## (undated) DEVICE — STAPLER COVIDIEN ENDO GIA STANDARD HANDLE

## (undated) DEVICE — GOWN XXL

## (undated) DEVICE — SUT PROLENE 2-0 36" SH

## (undated) DEVICE — SUT PDS II 1 48" TP-1

## (undated) DEVICE — FOLEY TRAY 16FR 5CC LF UMETER CLOSED

## (undated) DEVICE — TUBING SMOKE EVAC 3/8" X 10FT FOR NEPTUNE